# Patient Record
Sex: MALE | Race: BLACK OR AFRICAN AMERICAN | Employment: PART TIME | URBAN - METROPOLITAN AREA
[De-identification: names, ages, dates, MRNs, and addresses within clinical notes are randomized per-mention and may not be internally consistent; named-entity substitution may affect disease eponyms.]

---

## 2017-01-02 DIAGNOSIS — N18.5 CKD (CHRONIC KIDNEY DISEASE) STAGE 5, GFR LESS THAN 15 ML/MIN (H): Primary | ICD-10-CM

## 2017-01-02 NOTE — NURSING NOTE
Labs per clinic 2A protocol.  Follow up/CKD 5  Last OV: 10/26/16  Vickie Bryson LPN  Nephrology  [facility]  [phone]

## 2017-01-04 ENCOUNTER — TELEPHONE (OUTPATIENT)
Dept: PHARMACY | Facility: CLINIC | Age: 62
End: 2017-01-04

## 2017-01-04 ENCOUNTER — ALLIED HEALTH/NURSE VISIT (OUTPATIENT)
Dept: TRANSPLANT | Facility: CLINIC | Age: 62
End: 2017-01-04
Payer: COMMERCIAL

## 2017-01-04 VITALS — SYSTOLIC BLOOD PRESSURE: 133 MMHG | HEART RATE: 89 BPM | DIASTOLIC BLOOD PRESSURE: 70 MMHG

## 2017-01-04 DIAGNOSIS — N18.5 CKD (CHRONIC KIDNEY DISEASE) STAGE 5, GFR LESS THAN 15 ML/MIN (H): ICD-10-CM

## 2017-01-04 DIAGNOSIS — N18.5 CKD (CHRONIC KIDNEY DISEASE) STAGE 5, GFR LESS THAN 15 ML/MIN (H): Primary | ICD-10-CM

## 2017-01-04 DIAGNOSIS — N18.9 ANEMIA IN CHRONIC RENAL DISEASE: ICD-10-CM

## 2017-01-04 DIAGNOSIS — D63.1 ANEMIA IN STAGE 5 CHRONIC KIDNEY DISEASE (H): ICD-10-CM

## 2017-01-04 DIAGNOSIS — D63.1 ANEMIA IN CHRONIC RENAL DISEASE: ICD-10-CM

## 2017-01-04 DIAGNOSIS — N18.5 ANEMIA IN STAGE 5 CHRONIC KIDNEY DISEASE (H): ICD-10-CM

## 2017-01-04 LAB
ALBUMIN SERPL-MCNC: 3 G/DL (ref 3.4–5)
ANION GAP SERPL CALCULATED.3IONS-SCNC: 13 MMOL/L (ref 3–14)
BUN SERPL-MCNC: 88 MG/DL (ref 7–30)
CALCIUM SERPL-MCNC: 8.1 MG/DL (ref 8.5–10.1)
CHLORIDE SERPL-SCNC: 113 MMOL/L (ref 94–109)
CO2 SERPL-SCNC: 16 MMOL/L (ref 20–32)
CREAT SERPL-MCNC: 5.09 MG/DL (ref 0.66–1.25)
ERYTHROCYTE [DISTWIDTH] IN BLOOD BY AUTOMATED COUNT: 18.1 % (ref 10–15)
FERRITIN SERPL-MCNC: 663 NG/ML (ref 26–388)
GFR SERPL CREATININE-BSD FRML MDRD: 12 ML/MIN/1.7M2
GLUCOSE SERPL-MCNC: 110 MG/DL (ref 70–99)
HCT VFR BLD AUTO: 27.6 % (ref 40–53)
HGB BLD-MCNC: 8.6 G/DL (ref 13.3–17.7)
IRON SATN MFR SERPL: 18 % (ref 15–46)
IRON SERPL-MCNC: 43 UG/DL (ref 35–180)
MCH RBC QN AUTO: 29 PG (ref 26.5–33)
MCHC RBC AUTO-ENTMCNC: 31.2 G/DL (ref 31.5–36.5)
MCV RBC AUTO: 93 FL (ref 78–100)
PHOSPHATE SERPL-MCNC: 5.3 MG/DL (ref 2.5–4.5)
PLATELET # BLD AUTO: 174 10E9/L (ref 150–450)
POTASSIUM SERPL-SCNC: 4 MMOL/L (ref 3.4–5.3)
RBC # BLD AUTO: 2.97 10E12/L (ref 4.4–5.9)
SODIUM SERPL-SCNC: 142 MMOL/L (ref 133–144)
TIBC SERPL-MCNC: 243 UG/DL (ref 240–430)
WBC # BLD AUTO: 5 10E9/L (ref 4–11)

## 2017-01-04 PROCEDURE — 80069 RENAL FUNCTION PANEL: CPT | Performed by: INTERNAL MEDICINE

## 2017-01-04 PROCEDURE — 83550 IRON BINDING TEST: CPT | Performed by: INTERNAL MEDICINE

## 2017-01-04 PROCEDURE — 40000269 ZZH STATISTIC NO CHARGE FACILITY FEE: Mod: ZF

## 2017-01-04 PROCEDURE — 63400005 ZZH RX 634: Mod: ZF

## 2017-01-04 PROCEDURE — 85027 COMPLETE CBC AUTOMATED: CPT | Performed by: INTERNAL MEDICINE

## 2017-01-04 PROCEDURE — 83540 ASSAY OF IRON: CPT | Performed by: INTERNAL MEDICINE

## 2017-01-04 PROCEDURE — 82728 ASSAY OF FERRITIN: CPT | Performed by: INTERNAL MEDICINE

## 2017-01-04 PROCEDURE — 96372 THER/PROPH/DIAG INJ SC/IM: CPT | Mod: ZF

## 2017-01-04 NOTE — NURSING NOTE
"Chief Complaint   Patient presents with     Allied Health Visit     Labs and possible Aranesp injection       Initial /70 mmHg  Pulse 89 Estimated body mass index is 31.59 kg/(m^2) as calculated from the following:    Height as of 11/29/16: 1.753 m (5' 9\").    Weight as of 12/1/16: 97.07 kg (214 lb).  BP completed using cuff size: regular  "

## 2017-01-04 NOTE — TELEPHONE ENCOUNTER
Anemia Management Note  SUBJECTIVE/OBJECTIVE:  Referred by Dr. Brice Caraballo on 2015.  Primary Diagnosis: Anemia in Chronic Kidney Disease (N18.5 D63.1)   Secondary Diagnosis: Chronic Kidney Disease, Stage V (N18.5)  Hgb goal range: 9-10  Epo/Darbo: Aranesp 60 mcg/0.3 ml every 2 weeks as needed for hgb<10g/dL - in clinic  RX order expires on 17  Iron regimen: Ferrous Sulfate TID  Lab orders  on 2017 In Epic        Anemia Latest Ref Rng 2016   ANASTASIYA Dose - 60 mcg - - 60 mcg - 60 mcg 60 mcg   Hemoglobin 13.3 - 17.7 g/dL 7.9(L) 6.7(LL) 6.9(LL) 7.0(L) - 7.6(L) 8.6(L)   IV Iron Dose - - - - - 750mg - -   TSAT 15 - 46 % 14(L) - - 13(L) - - 18   Ferritin 26 - 388 ng/mL 505(H) - - 460(H) - - 663(H)     BP Readings from Last 3 Encounters:   17 133/70   16 139/68   16 139/71     Wt Readings from Last 2 Encounters:   16 214 lb (97.07 kg)   16 220 lb (99.791 kg)     Appt w/Dr Caraballo is scheduled for at 1:30    ASSESSMENT:  Hgb: Not at goal - received dose in clinic - recommend continue current regimen  TSat: not at goal (>30%) but ferritin >500ng/mL.  IV iron not indicated at this time per anemia protocol.     PLAN:  RTC for hgb then aranesp if needed in 2 week(s)  Next lab/aranesp appt is scheduled for 17 at 9:00    Orders needed to be renewed (for next follow-up date) in [facility]: None    Iron labs due:  17 (4 weeks after IV iron infusion)    Plan discussed with:  No call made - next appt is scheduled  Plan provided by:  Lakisha    NEXT FOLLOW-UP DATE:  17    Anemia Management Service  Zelda Rich PharmD and Rosa Marcial CPhT  Phone: [phone]  Fax: [phone]

## 2017-01-06 ENCOUNTER — DOCUMENTATION ONLY (OUTPATIENT)
Dept: TRANSPLANT | Facility: CLINIC | Age: 62
End: 2017-01-06

## 2017-01-06 NOTE — PROGRESS NOTES
Call from Murray was returned from this morning. He is wondering what is necessary to complete his transplant evaluation, as his Marla  is urging him to look into this matter. He does not recall if he has the letter that was sent to him this fall with the list of items from [facility]. He is to start with update of his dental work, colonoscopy and completion of the urine collection that Dr. Herbert has requested some time ago. After these items are completed, we will review and order all consults. I encouraged him to become active in this process and if he is not finding he is interested, he could be removed from the waiting list. I also asked him to talk to Dr. Caraballo about his Prednisone dose. We would to consider him for the steroid free protocol post transplant.

## 2017-01-11 ENCOUNTER — TELEPHONE (OUTPATIENT)
Dept: NEPHROLOGY | Facility: CLINIC | Age: 62
End: 2017-01-11

## 2017-01-11 ENCOUNTER — OFFICE VISIT (OUTPATIENT)
Dept: NEPHROLOGY | Facility: CLINIC | Age: 62
End: 2017-01-11
Attending: INTERNAL MEDICINE
Payer: COMMERCIAL

## 2017-01-11 VITALS
TEMPERATURE: 98.4 F | HEART RATE: 77 BPM | SYSTOLIC BLOOD PRESSURE: 145 MMHG | HEIGHT: 69 IN | WEIGHT: 202.6 LBS | OXYGEN SATURATION: 99 % | BODY MASS INDEX: 30.01 KG/M2 | DIASTOLIC BLOOD PRESSURE: 74 MMHG

## 2017-01-11 DIAGNOSIS — N18.5 CKD (CHRONIC KIDNEY DISEASE) STAGE 5, GFR LESS THAN 15 ML/MIN (H): Primary | ICD-10-CM

## 2017-01-11 DIAGNOSIS — I10 ESSENTIAL HYPERTENSION: ICD-10-CM

## 2017-01-11 DIAGNOSIS — E11.22 TYPE 2 DIABETES MELLITUS WITH STAGE 5 CHRONIC KIDNEY DISEASE NOT ON CHRONIC DIALYSIS, WITHOUT LONG-TERM CURRENT USE OF INSULIN (H): ICD-10-CM

## 2017-01-11 DIAGNOSIS — D47.2 MGUS (MONOCLONAL GAMMOPATHY OF UNKNOWN SIGNIFICANCE): ICD-10-CM

## 2017-01-11 DIAGNOSIS — N18.5 TYPE 2 DIABETES MELLITUS WITH STAGE 5 CHRONIC KIDNEY DISEASE NOT ON CHRONIC DIALYSIS, WITHOUT LONG-TERM CURRENT USE OF INSULIN (H): ICD-10-CM

## 2017-01-11 PROCEDURE — 99213 OFFICE O/P EST LOW 20 MIN: CPT | Mod: ZF

## 2017-01-11 ASSESSMENT — PAIN SCALES - GENERAL: PAINLEVEL: NO PAIN (0)

## 2017-01-11 NOTE — Clinical Note
1/11/2017       RE: Murray Nicholson  [address]  [place] [address]     Dear Colleague,    Thank you for referring your patient, Murray Nicholson, to the [facility] NEPHROLOGY at [facility] Please see a copy of my visit note below.    Nephrology follow up    61yo aaM with diabetic/hypertensive nephropathy with albuminuria since 2004 here for follow up. Doing well since last visit except for pneumonia which he is now over. Denies nausea, vomiting, anorexia, dysuria, hematuria. 4pt ROS negative.    PMHx  DM - since ~2000, no retinopathy/neuropathy  HTN - since ~2000  Dyslipidemia  GERD  CHF - admit 2008, diastolic dysfunction  CKD - since ~2000   - Ualb/Cr 534 in 2004   - UAs negative for blood    Medications  Hydralazine 50mg TID  Metoprolol 100mg daily  Lisinopril 40mg BID  Isosorbide mononitrate 60mg daily  Amlodipine 5mg BID  Furosemide 80mg BID  Atorvastatin 40mg daily  ASA 81mg daily  Omeprazole 40mg daily  Fe sulf 325mg TID  darbe  K Cl  Cholecalciferol 5000 daily  No NSAIDs    Exam   145/77   148/76   145/74   P77   98.4  Gen - nad  CV - rrr, no rub, +KELLY  Resp - cta bilat  Ext - 2+ edema  Neuro - no asterixis  Psych - pleasant, appropriate, talkative    Labs     2014 2015     2016  2017   12/'02 12/'04 11/'06 8/'13 10/22 4/9 5/7 5/18 5/19 6/16 2/10 10/26 1/4  Cr 1.5  2.2 2.1 2.7 4.3 4.7 6.3 5.6  4.3  3.8  4.5  5.1 (eGFR 12)  Uprot/Cr   1.3  Ualb/Cr 534    2270    7/3/13 UA - 100 alb, neg blood  5/5/15 UA - 100 alb, neg blood    Assessment/Recommendations: 61yo aaM with stage IV/V CKD with proteinuria.   CKD - stage IV/V likely due to DM and hypertension. No acute indication for dialysis at this time.    - chosen PD for RRT   - transplant - needs dental evaluation, colonoscopy, and 24hr urine for Dr Herbert (evaluate MGUS)   - avoid NSAIDs    Volume/BP - BP slightly above target with significant edema. Target BP <140/90.    - continue current antihypertensives for  now   - increased furosemide to 80mg BID    Anemia - Referred to anemia program. Continue Fe. Hgb improved to 8.6 from 6-7s in Nov/Dec.    Acid-base - bicarb low at 16, monitor for now.    Ca/phos - acceptable    Electrolytes - acceptable.    Current RRT choice: PD  RTC in 2 months for BP check, renal panel, CBC        Again, thank you for allowing me to participate in the care of your patient.      Sincerely,    Brice Caraballo MD

## 2017-01-11 NOTE — NURSING NOTE
"Chief Complaint   Patient presents with     RECHECK     Ckd stage 5 follow up       Initial /74 mmHg  Pulse 77  Temp(Src) 98.4  F (36.9  C) (Oral)  Ht 1.753 m (5' 9\")  Wt 91.899 kg (202 lb 9.6 oz)  BMI 29.91 kg/m2  SpO2 99% Estimated body mass index is 29.91 kg/(m^2) as calculated from the following:    Height as of this encounter: 1.753 m (5' 9\").    Weight as of this encounter: 91.899 kg (202 lb 9.6 oz).  BP completed using cuff size: regular    "

## 2017-01-11 NOTE — MR AVS SNAPSHOT
After Visit Summary   1/11/2017    Murray Nicholson    MRN: [id number]           Patient Information     Date Of Birth          1955        Visit Information        Provider Department      1/11/2017 1:30 PM Brice Caraballo MD [facility] Nephrology        Today's Diagnoses     CKD (chronic kidney disease) stage 5, GFR less than 15 ml/min (H)    -  1     Type 2 diabetes mellitus with stage 5 chronic kidney disease not on chronic dialysis, without long-term current use of insulin (H)         Essential hypertension         MGUS (monoclonal gammopathy of unknown significance)            Follow-ups after your visit        Follow-up notes from your care team     Return in about 2 months (around 3/11/2017).      Your next 10 appointments already scheduled     Jan 18, 2017  9:00 AM   Lab with [facility] LAB    [facility] ([facility])    [address] [place] [address]   [phone]            Jan 18, 2017  9:30 AM   (Arrive by 9:15 AM)   INJECTION with Uc Txc Nurse   [facility] Solid Organ Transplant ([facility])    [address] [address]   [phone]            Feb 01, 2017  9:00 AM   Lab with UC LAB    [facility] ([facility])    [address] [place] [address]   [phone]            Feb 01, 2017  9:30 AM   (Arrive by 9:15 AM)   INJECTION with Uc Txc Nurse   [facility] Solid Organ Transplant ([facility])    [address] [place] [address]   [phone]            Feb 15, 2017  9:00 AM   Lab with UC LAB    [facility] ([facility])    [address] [place] [address]   [phone]            Feb 15, 2017  9:30 AM   (Arrive by 9:15 AM)   INJECTION with Uc Txc Nurse   [facility] Solid Organ Transplant ([facility])    [address] [id number] [phone]            Mar 15, 2017  1:00 PM   Lab with  LAB   [facility] ([facility])    [address] [place] [id number] [phone]            Mar 15, 2017  2:00 PM   (Arrive by 1:30 PM)   Return Visit with Brice Caraballo MD   [facility] Nephrology ([facility])    [address] [place] [address] [phone]              Who to contact     If you have questions or need follow up information about today's clinic visit or your schedule please contact [facility] NEPHROLOGY directly at [phone].  Normal or non-critical lab and imaging results will be communicated to you by Stadionauthart, letter or phone within 4 business days after the clinic has received the results. If you do not hear from us within 7 days, please contact the clinic through Stadionauthart or phone. If you have a critical or abnormal lab result, we will notify you by phone as soon as possible.  Submit refill requests through Rainbow or call your pharmacy and they will forward the refill request to us. Please allow 3 business days for your refill to be completed.          Additional Information About Your Visit        Rainbow Information     Rainbow gives you secure access to your electronic health record. If you see a primary care provider, you can also send messages to your care team and make appointments. If you have questions, please call your primary care clinic.  If you do not have a primary care provider, please call [phone] and they will assist you.        Care EveryWhere ID     This is your Care EveryWhere ID. This could be used by other organizations to access your [facility] medical records  [id number]        Your Vitals Were     Pulse Temperature Height BMI (Body Mass Index) Pulse Oximetry       77 98.4  F (36.9  C) (Oral) 1.753 m (5' 9\") 29.91 kg/m2 99%        Blood " Pressure from Last 3 Encounters:   01/11/17 145/74   01/04/17 133/70   12/21/16 139/68    Weight from Last 3 Encounters:   01/11/17 91.899 kg (202 lb 9.6 oz)   12/01/16 97.07 kg (214 lb)   11/29/16 99.791 kg (220 lb)              Today, you had the following     No orders found for display       Primary Care Provider Office Phone # Fax #    Yahir Turcios [phone]       [place] [address]  [facility] [address]        Thank you!     Thank you for choosing [facility] NEPHROLOGY  for your care. Our goal is always to provide you with excellent care. Hearing back from our patients is one way we can continue to improve our services. Please take a few minutes to complete the written survey that you may receive in the mail after your visit with us. Thank you!             Your Updated Medication List - Protect others around you: Learn how to safely use, store and throw away your medicines at www.disposemymeds.org.          This list is accurate as of: 1/11/17  2:24 PM.  Always use your most recent med list.                   Brand Name Dispense Instructions for use    albuterol 108 (90 BASE) MCG/ACT Inhaler    PROAIR HFA/PROVENTIL HFA/VENTOLIN HFA    1 Inhaler    Inhale 2 puffs into the lungs every 6 hours as needed for shortness of breath / dyspnea or wheezing       amLODIPine 5 MG tablet    NORVASC    180 tablet    1 TABLET BID- generic ok       aspirin 81 MG tablet      1 TABLET DAILY       atorvastatin 40 MG tablet    LIPITOR    90 tablet    Take 1 tablet (40 mg) by mouth daily       * azelastine-fluticasone 137-50 MCG/ACT nasal spray    DYMISTA     Spray 1 spray into both nostrils 2 times daily       * azelastine-fluticasone 137-50 MCG/ACT nasal spray    DYMISTA    23 g    Spray 1 spray into both nostrils 2 times daily       darbepoetin emily 60 MCG/0.3ML injection    ARANESP (ALBUMIN FREE)    0.3 mL    Inject 0.3 mLs (60 mcg) Subcutaneous every 14 days As needed for hgb<10g/dL.  If Hgb  increases >1 point in 2 weeks (if blood transfusion given, use hgb PRIOR to this), SYSTOLIC BP > 180 mmHg or hgb>=10g/dL, HOLD DOSE. Dose must be within 1 week of Hgb.  Per anemia protocol with Brice Caraballo MD       ferrous sulfate 325 (65 FE) MG tablet    IRON    200 tablet    Take 1 tablet (325 mg) by mouth 3 times daily (with meals)       furosemide 80 MG tablet    LASIX    90 tablet    Take 1 tablet (80 mg) by mouth 2 times daily       glipiZIDE 5 MG tablet    GLUCOTROL    90 tablet    Take 1 tablet by mouth. TAKE 1 TABLET BY MOUTH DAILY BEFORE A MEAL       hydrALAZINE 50 MG tablet    APRESOLINE    270 tablet    Take 1 tablet (50 mg) by mouth 3 times daily       isosorbide mononitrate 60 MG 24 hr tablet    IMDUR    90 tablet    Take 1 tablet (60 mg) by mouth daily       levofloxacin 750 MG tablet    LEVAQUIN    5 tablet    Take 1 tablet (750 mg) by mouth daily       lisinopril 40 MG tablet    PRINIVIL/ZESTRIL    180 tablet    Take 1 tablet by mouth. one twice daily       loratadine 10 MG tablet    CLARITIN     Take 10 mg by mouth as needed.       metoprolol 100 MG 24 hr tablet    TOPROL XL    90 tablet    Take 1 tablet (100 mg) by mouth daily       omeprazole 40 MG capsule    priLOSEC     Take 20 mg by mouth daily       potassium chloride SA 20 MEQ CR tablet    K-DUR/KLOR-CON M    90 tablet    Take 1 tablet (20 mEq) by mouth daily       PREDNISONE PO      Take 10 mg by mouth daily       UNABLE TO FIND      MEDICATION NAME: airborne prn.       VITAMIN D3 PO      Take 5,000 Units by mouth daily       * Notice:  This list has 2 medication(s) that are the same as other medications prescribed for you. Read the directions carefully, and ask your doctor or other care provider to review them with you.

## 2017-01-11 NOTE — PROGRESS NOTES
Nephrology follow up    61yo aaM with diabetic/hypertensive nephropathy with albuminuria since 2004 here for follow up. Doing well since last visit except for pneumonia which he is now over. Denies nausea, vomiting, anorexia, dysuria, hematuria. 4pt ROS negative.    PMHx  DM - since ~2000, no retinopathy/neuropathy  HTN - since ~2000  Dyslipidemia  GERD  CHF - admit 2008, diastolic dysfunction  CKD - since ~2000   - Ualb/Cr 534 in 2004   - UAs negative for blood    Medications  Hydralazine 50mg TID  Metoprolol 100mg daily  Lisinopril 40mg BID  Isosorbide mononitrate 60mg daily  Amlodipine 5mg BID  Furosemide 80mg BID  Atorvastatin 40mg daily  ASA 81mg daily  Omeprazole 40mg daily  Fe sulf 325mg TID  darbe  K Cl  Cholecalciferol 5000 daily  No NSAIDs    Exam   145/77   148/76   145/74   P77   98.4  Gen - nad  CV - rrr, no rub, +KELLY  Resp - cta bilat  Ext - 2+ edema  Neuro - no asterixis  Psych - pleasant, appropriate, talkative    Labs     2014 2015     2016  2017   12/'02 12/'04 11/'06 8/'13 10/22 4/9 5/7 5/18 5/19 6/16 2/10 10/26 1/4  Cr 1.5  2.2 2.1 2.7 4.3 4.7 6.3 5.6  4.3  3.8  4.5  5.1 (eGFR 12)  Uprot/Cr   1.3  Ualb/Cr 534    2270    7/3/13 UA - 100 alb, neg blood  5/5/15 UA - 100 alb, neg blood    Assessment/Recommendations: 61yo aaM with stage IV/V CKD with proteinuria.   CKD - stage IV/V likely due to DM and hypertension. No acute indication for dialysis at this time.    - chosen PD for RRT   - transplant - needs dental evaluation, colonoscopy, and 24hr urine for Dr Herbert (evaluate MGUS)   - avoid NSAIDs    Volume/BP - BP slightly above target with significant edema. Target BP <140/90.    - continue current antihypertensives for now   - increased furosemide to 80mg BID    Anemia - Referred to anemia program. Continue Fe. Hgb improved to 8.6 from 6-7s in Nov/Dec.    Acid-base - bicarb low at 16, monitor for now.    Ca/phos - acceptable    Electrolytes - acceptable.    Current RRT choice: PD  RTC in 2 months  for BP check, renal panel, CBC

## 2017-01-11 NOTE — TELEPHONE ENCOUNTER
Spoke with Dr. Caraballo regarding need for vascular access consult (for peritoneal catheter placement), and he requested this be ordered. Updated patient. Discussed consult process, and he agreed to schedule. Message sent to vascular access.    Carla Kyle RN

## 2017-01-18 ENCOUNTER — TELEPHONE (OUTPATIENT)
Dept: PHARMACY | Facility: CLINIC | Age: 62
End: 2017-01-18

## 2017-01-18 ENCOUNTER — ALLIED HEALTH/NURSE VISIT (OUTPATIENT)
Dept: TRANSPLANT | Facility: CLINIC | Age: 62
End: 2017-01-18
Payer: COMMERCIAL

## 2017-01-18 DIAGNOSIS — D63.1 ANEMIA IN STAGE 5 CHRONIC KIDNEY DISEASE (H): ICD-10-CM

## 2017-01-18 DIAGNOSIS — N18.9 ANEMIA IN CHRONIC RENAL DISEASE: Primary | ICD-10-CM

## 2017-01-18 DIAGNOSIS — N18.5 ANEMIA IN STAGE 5 CHRONIC KIDNEY DISEASE (H): ICD-10-CM

## 2017-01-18 DIAGNOSIS — N18.5 CKD (CHRONIC KIDNEY DISEASE) STAGE 5, GFR LESS THAN 15 ML/MIN (H): ICD-10-CM

## 2017-01-18 DIAGNOSIS — D63.1 ANEMIA IN CHRONIC RENAL DISEASE: Primary | ICD-10-CM

## 2017-01-18 LAB
HCT VFR BLD AUTO: 30 % (ref 40–53)
HGB BLD-MCNC: 9.9 G/DL (ref 13.3–17.7)

## 2017-01-18 PROCEDURE — 40000269 ZZH STATISTIC NO CHARGE FACILITY FEE: Mod: ZF

## 2017-01-18 PROCEDURE — 36415 COLL VENOUS BLD VENIPUNCTURE: CPT | Performed by: INTERNAL MEDICINE

## 2017-01-18 PROCEDURE — 85018 HEMOGLOBIN: CPT | Performed by: INTERNAL MEDICINE

## 2017-01-18 PROCEDURE — 96372 THER/PROPH/DIAG INJ SC/IM: CPT | Mod: ZF

## 2017-01-18 PROCEDURE — 85014 HEMATOCRIT: CPT | Performed by: INTERNAL MEDICINE

## 2017-01-18 NOTE — TELEPHONE ENCOUNTER
Anemia Management Note  SUBJECTIVE/OBJECTIVE:  Referred by Dr. Brice Caraballo on 2015.  Primary Diagnosis: Anemia in Chronic Kidney Disease (N18.5 D63.1)   Secondary Diagnosis: Chronic Kidney Disease, Stage V (N18.5)  Hgb goal range: 9-10  Epo/Darbo: Aranesp 60 mcg/0.3 ml every 2 weeks as needed for hgb<10g/dL - in clinic  RX order expires on 17  Iron regimen: Ferrous Sulfate TID  Lab orders  on 2017 In Epic    Anemia Latest Ref Rng 2016   ANASTASIYA Dose - - - 60 mcg - 60 mcg 60 mcg -   Hemoglobin 13.3 - 17.7 g/dL 6.7(LL) 6.9(LL) 7.0(L) - 7.6(L) 8.6(L) 9.9(L)   IV Iron Dose - - - - 750mg - - -   TSAT 15 - 46 % - - 13(L) - - 18 -   Ferritin 26 - 388 ng/mL - - 460(H) - - 663(H) -     BP Readings from Last 3 Encounters:   17 145/74   17 133/70   16 139/68     Wt Readings from Last 2 Encounters:   17 202 lb 9.6 oz (91.899 kg)   16 214 lb (97.07 kg)     Next lab/aranesp appt is scheduled for 17 at 9:00 am    ASSESSMENT:  Hgb: At goal but rapid rise in hgb (>1pt/2 weeks) - recommend hold dose  TSat: not at goal (>30%) but ferritin >500ng/mL.  IV iron not indicated at this time per anemia protocol.     PLAN:  Hold Aranesp and RTC for hgb then aranesp if needed in 2 week(s)    Orders needed to be renewed (for next follow-up date) in [facility]: None    Iron labs due:  17    Plan discussed with:  No call made - next appt is scheduled  Plan provided by:  Lakisha    NEXT FOLLOW-UP DATE:  17    Anemia Management Service  Zelda Rich,PharmD and Rosa MarcialCPhT  Phone: [phone]  Fax: [phone]

## 2017-01-27 ENCOUNTER — TELEPHONE (OUTPATIENT)
Dept: TRANSPLANT | Facility: CLINIC | Age: 62
End: 2017-01-27

## 2017-01-27 ENCOUNTER — DOCUMENTATION ONLY (OUTPATIENT)
Dept: TRANSPLANT | Facility: CLINIC | Age: 62
End: 2017-01-27

## 2017-01-27 NOTE — TELEPHONE ENCOUNTER
Left message for patient discussing catheter placement. Advised it would be arranged based on lab results and uremic symptoms. Getting consult done now would ensure easier scheduling when / if the time comes for dialysis. Left phone number to call if further questions.    Carla Kyle RN

## 2017-01-27 NOTE — PROGRESS NOTES
Murray called today wondering how urgent his PD cath placement was. I redirected this question to Sum, however, might be a question for his nephrologist. Additionally he wanted to change his Aranesp and lab appts, and was provided with Margaret the 's phone number.

## 2017-01-27 NOTE — TELEPHONE ENCOUNTER
Received a call from the patient who wondering how urgent his PD cath placement was. Writer addressed that timing of PD cath placement surgery will be coordinated with nephrology team, based on his clinical presentation. We'll F/U with Dr. Caraballo's team to determine when he is ready to initiate PD and then we'll schedule surgery date that allows time for PD training. He expressed that he would like to wait until spring for surgery. Writer recommended he could discuss with Dr. Arvizu at consult clinic for timing of surgery.  In addition, he wanted to change his Aranesp and lab appts on the day of PD consult visit. He was instructed to contact nephrology team to make the change.  Writer provided contact phone number and instructed patient to call writer with any questions or concerns. Patient verbalized acceptance and understanding of plan.

## 2017-01-31 ENCOUNTER — ALLIED HEALTH/NURSE VISIT (OUTPATIENT)
Dept: TRANSPLANT | Facility: CLINIC | Age: 62
End: 2017-01-31
Attending: SURGERY
Payer: COMMERCIAL

## 2017-01-31 ENCOUNTER — OFFICE VISIT (OUTPATIENT)
Dept: TRANSPLANT | Facility: CLINIC | Age: 62
End: 2017-01-31
Attending: SURGERY
Payer: COMMERCIAL

## 2017-01-31 VITALS — HEART RATE: 85 BPM | DIASTOLIC BLOOD PRESSURE: 73 MMHG | SYSTOLIC BLOOD PRESSURE: 146 MMHG

## 2017-01-31 VITALS
RESPIRATION RATE: 16 BRPM | TEMPERATURE: 98.1 F | SYSTOLIC BLOOD PRESSURE: 144 MMHG | DIASTOLIC BLOOD PRESSURE: 76 MMHG | OXYGEN SATURATION: 100 % | HEART RATE: 77 BPM

## 2017-01-31 DIAGNOSIS — D63.1 ANEMIA IN CHRONIC RENAL DISEASE: ICD-10-CM

## 2017-01-31 DIAGNOSIS — D63.1 ANEMIA IN STAGE 5 CHRONIC KIDNEY DISEASE (H): ICD-10-CM

## 2017-01-31 DIAGNOSIS — N18.5 ANEMIA IN STAGE 5 CHRONIC KIDNEY DISEASE (H): ICD-10-CM

## 2017-01-31 DIAGNOSIS — N18.5 CKD (CHRONIC KIDNEY DISEASE) STAGE 5, GFR LESS THAN 15 ML/MIN (H): Primary | ICD-10-CM

## 2017-01-31 DIAGNOSIS — E11.9 TYPE 2 DIABETES MELLITUS WITHOUT COMPLICATION, WITH LONG-TERM CURRENT USE OF INSULIN (H): ICD-10-CM

## 2017-01-31 DIAGNOSIS — Z79.4 TYPE 2 DIABETES MELLITUS WITHOUT COMPLICATION, WITH LONG-TERM CURRENT USE OF INSULIN (H): ICD-10-CM

## 2017-01-31 DIAGNOSIS — Z76.82 ORGAN TRANSPLANT CANDIDATE: ICD-10-CM

## 2017-01-31 DIAGNOSIS — N18.9 ANEMIA IN CHRONIC RENAL DISEASE: ICD-10-CM

## 2017-01-31 DIAGNOSIS — N18.5 CKD (CHRONIC KIDNEY DISEASE) STAGE 5, GFR LESS THAN 15 ML/MIN (H): ICD-10-CM

## 2017-01-31 LAB
HCT VFR BLD AUTO: 28.5 % (ref 40–53)
HGB BLD-MCNC: 9.1 G/DL (ref 13.3–17.7)

## 2017-01-31 PROCEDURE — 96372 THER/PROPH/DIAG INJ SC/IM: CPT | Mod: ZF

## 2017-01-31 PROCEDURE — 63400005 ZZH RX 634: Mod: ZF

## 2017-01-31 PROCEDURE — 36415 COLL VENOUS BLD VENIPUNCTURE: CPT | Performed by: INTERNAL MEDICINE

## 2017-01-31 PROCEDURE — 40000269 ZZH STATISTIC NO CHARGE FACILITY FEE: Mod: ZF

## 2017-01-31 PROCEDURE — 85014 HEMATOCRIT: CPT | Performed by: INTERNAL MEDICINE

## 2017-01-31 PROCEDURE — 85018 HEMOGLOBIN: CPT | Performed by: INTERNAL MEDICINE

## 2017-01-31 NOTE — PROGRESS NOTES
Dialysis Access Service  Consult Note    Referred by Dr. Caraballo for creation of peritoneal dialysis access.    HPI: Mr. Nicholson is being seen today for placement of peritoneal dialysis access due to Chronic renal failure from Type 2 Diabetes.  He is right handed. Mr. Nicholson is not dialyzing at this time.     Past Surgical History   Procedure Laterality Date     No history of surgery       Laparoscopic herniorrhaphy inguinal bilateral Bilateral 7/24/2015     Procedure: LAPAROSCOPIC HERNIORRHAPHY INGUINAL BILATERAL;  Surgeon: Bobby Mcconnell MD;  Location: [facility] OR       Risk factors for complications of PD catheter access are:     Hx of abdominal surgery  [x]    Comment:       Hx of  hernia repair/hydrocele [x]        History of previous PD catheter []    Comment:     Respiratory problems   [x]    Comment: pneumonia x 2 months, cleared up 3 weeks ago.  Obesity    [x]      Diabetes   [x]  Type 2       Anticoagulation:   [x]    Agent:       81 mg                               Current immunosuppression [x]   Comment: intermittent prednisone pulses for gout (3 week duration, about every couple months).                                 PHYSICAL EXAM:  Temp:  [98.1  F (36.7  C)] 98.1  F (36.7  C)  Pulse:  [77] 77  Resp:  [16] 16  BP: (144)/(76) 144/76 mmHg  SpO2:  [100 %] 100 %  healthy, alert and cooperative  Abdominal Exam: obese.  Belt line about 3 inches below umbilicus. No hernias.    Assessment & Plan: Mr. Nicholson is a good candidate for peritoneal  dialysis access.  I would recommend laparoscopic PD catheter placement with left sided exit, created under General.     The surgical risks and benefits were reviewed and questions were answered. We discussed the day of surgery plan, anesthesia, postop care, risk of infection, bleeding, thrombosis, possible need for intraoperative omentectomy or newly detected hernia repair or obliteration of patent processus vaginalis (if male), future need for surgical revision or  removal. This was contrasted with morbidity and mortality risk of long-term catheter based hemodialysis access. The patient does not wish to proceed with surgery for permanent access creation at this time. The patient was counselled to contact our nurse coordinator, VERONICA Daniel (Sum), [facility] at [phone] with any questions or concerns.  Thank you for the opportunity to participate in Mr. Nicholson's care.    TT: 35 min, CT: 25 min.          Esteban Arvizu MD  Department of Surgery

## 2017-01-31 NOTE — MR AVS SNAPSHOT
After Visit Summary   1/31/2017    Murray Nicholson    MRN: [id number]           Patient Information     Date Of Birth          1955        Visit Information        Provider Department      1/31/2017 10:00 AM Esteban Arvizu MD [facility] Organ Transplant        Today's Diagnoses     CKD (chronic kidney disease) stage 5, GFR less than 15 ml/min (H)    -  1    Anemia in chronic renal disease        Organ transplant candidate        Type 2 diabetes mellitus without complication, with long-term current use of insulin (H)           Follow-ups after your visit        Your next 10 appointments already scheduled     Feb 28, 2017  3:00 PM CST   Infusion 120 with UC SPEC INFUSION, UC 47 ATC   [facility] [place] [facility] ([facility])    [address] [place] [address]  2nd [facility] [id number] [phone]            Mar 07, 2017 10:30 AM CST   Lab with UC LAB   [facility] ([facility])    [address] [place] [id number] [phone]            Mar 07, 2017 11:00 AM CST   (Arrive by 10:45 AM)   INJECTION with Uc Txc Nurse   [facility] Transplant ([facility])    [address] [place] [address]  3rd [facility] [id number] [phone]            Mar 15, 2017  1:00 PM CDT   Lab with UC LAB   [facility] ([facility])    [address] [place] [id number] [phone]            Mar 15, 2017  2:00 PM CDT   (Arrive by 1:30 PM)   Return Visit with Brice Caraballo MD   [facility] Nephrology ([facility])    [address] [place] [id number] [phone]              Who to contact     If you have questions or need follow up information about today's clinic visit or your schedule please contact [facility] SOLID ORGAN TRANSPLANT directly at [phone].  Normal or  non-critical lab and imaging results will be communicated to you by MyChart, letter or phone within 4 business days after the clinic has received the results. If you do not hear from us within 7 days, please contact the clinic through [employer] or phone. If you have a critical or abnormal lab result, we will notify you by phone as soon as possible.  Submit refill requests through [employer] or call your pharmacy and they will forward the refill request to us. Please allow 3 business days for your refill to be completed.          Additional Information About Your Visit        [employer] Information     [employer] gives you secure access to your electronic health record. If you see a primary care provider, you can also send messages to your care team and make appointments. If you have questions, please call your primary care clinic.  If you do not have a primary care provider, please call [phone] and they will assist you.        Care EveryWhere ID     This is your Care EveryWhere ID. This could be used by other organizations to access your [place] medical records  [id number]        Your Vitals Were     Pulse Temperature Respirations Pulse Oximetry          77 98.1  F (36.7  C) (Oral) 16 100%         Blood Pressure from Last 3 Encounters:   02/15/17 122/66   01/31/17 146/73   01/31/17 144/76    Weight from Last 3 Encounters:   02/15/17 88.9 kg (196 lb)   01/11/17 91.9 kg (202 lb 9.6 oz)   12/01/16 97.1 kg (214 lb)              Today, you had the following     No orders found for display       Primary Care Provider Office Phone # Fax #    Yahir Turcios [phone]       [place] [address]  [facility] [address]        Thank you!     Thank you for choosing [facility] SOL[facility] ORGAN TRANSPLANT  for your care. Our goal is always to provide you with excellent care. Hearing back from our patients is one way we can continue to improve our services. Please take a few minutes to complete the written  survey that you may receive in the mail after your visit with us. Thank you!             Your Updated Medication List - Protect others around you: Learn how to safely use, store and throw away your medicines at www.disposemymeds.org.          This list is accurate as of: 1/31/17 11:59 PM.  Always use your most recent med list.                   Brand Name Dispense Instructions for use    albuterol 108 (90 BASE) MCG/ACT Inhaler    PROAIR HFA/PROVENTIL HFA/VENTOLIN HFA    1 Inhaler    Inhale 2 puffs into the lungs every 6 hours as needed for shortness of breath / dyspnea or wheezing       amLODIPine 5 MG tablet    NORVASC    180 tablet    1 TABLET BID- generic ok       aspirin 81 MG tablet      1 TABLET DAILY       atorvastatin 40 MG tablet    LIPITOR    90 tablet    Take 1 tablet (40 mg) by mouth daily       * azelastine-fluticasone 137-50 MCG/ACT nasal spray    DYMISTA     Spray 1 spray into both nostrils 2 times daily       * azelastine-fluticasone 137-50 MCG/ACT nasal spray    DYMISTA    23 g    Spray 1 spray into both nostrils 2 times daily       darbepoetin emily 60 MCG/0.3ML injection    ARANESP (ALBUMIN FREE)    0.3 mL    Inject 0.3 mLs (60 mcg) Subcutaneous every 14 days As needed for hgb<10g/dL.  If Hgb increases >1 point in 2 weeks (if blood transfusion given, use hgb PRIOR to this), SYSTOLIC BP > 180 mmHg or hgb>=10g/dL, HOLD DOSE. Dose must be within 1 week of Hgb.  Per anemia protocol with Brice Caraballo MD       ferrous sulfate 325 (65 FE) MG tablet    IRON    200 tablet    Take 1 tablet (325 mg) by mouth 3 times daily (with meals)       furosemide 80 MG tablet    LASIX    90 tablet    Take 1 tablet (80 mg) by mouth 2 times daily       glipiZIDE 5 MG tablet    GLUCOTROL    90 tablet    Take 1 tablet by mouth. TAKE 1 TABLET BY MOUTH DAILY BEFORE A MEAL       hydrALAZINE 50 MG tablet    APRESOLINE    270 tablet    Take 1 tablet (50 mg) by mouth 3 times daily       isosorbide mononitrate 60 MG 24 hr tablet     IMDUR    90 tablet    Take 1 tablet (60 mg) by mouth daily       levofloxacin 750 MG tablet    LEVAQUIN    5 tablet    Take 1 tablet (750 mg) by mouth daily       lisinopril 40 MG tablet    PRINIVIL/ZESTRIL    180 tablet    Take 1 tablet by mouth. one twice daily       loratadine 10 MG tablet    CLARITIN     Take 10 mg by mouth as needed.       metoprolol 100 MG 24 hr tablet    TOPROL XL    90 tablet    Take 1 tablet (100 mg) by mouth daily       omeprazole 40 MG capsule    priLOSEC     Take 20 mg by mouth daily       potassium chloride SA 20 MEQ CR tablet    K-DUR/KLOR-CON M    90 tablet    Take 1 tablet (20 mEq) by mouth daily       PREDNISONE PO      Take 10 mg by mouth daily       UNABLE TO FIND      MEDICATION NAME: airborne prn.       VITAMIN D3 PO      Take 5,000 Units by mouth daily       * Notice:  This list has 2 medication(s) that are the same as other medications prescribed for you. Read the directions carefully, and ask your doctor or other care provider to review them with you.

## 2017-01-31 NOTE — NURSING NOTE
"Chief Complaint   Patient presents with     Vascular Access Problem     PD cath consult       Initial /76 mmHg  Pulse 77  Temp(Src) 98.1  F (36.7  C) (Oral)  Resp 16  SpO2 100% Estimated body mass index is 29.91 kg/(m^2) as calculated from the following:    Height as of 1/11/17: 1.753 m (5' 9\").    Weight as of 1/11/17: 91.899 kg (202 lb 9.6 oz).  BP completed using cuff size: regular    "

## 2017-01-31 NOTE — NURSING NOTE
"Chief Complaint   Patient presents with     Allied Health Visit     Labs and possible Aranesp injection       Initial /73 mmHg  Pulse 85 Estimated body mass index is 29.91 kg/(m^2) as calculated from the following:    Height as of 17: 1.753 m (5' 9\").    Weight as of 17: 91.899 kg (202 lb 9.6 oz).  BP completed using cuff size: large    Patient identified by name and .  Aranesp given per protocol and patient left in stable condition.    Joyce Vega MA  "

## 2017-01-31 NOTE — LETTER
1/31/2017       RE: Murray Nicholson  [address]  [place] [address]     Dear Colleague,    Thank you for referring your patient, Murray Nicholson, to the [facility] SOLID ORGAN TRANSPLANT at [facility]. Please see a copy of my visit note below.    Dialysis Access Service  Consult Note    Referred by Dr. Caraballo for creation of peritoneal dialysis access.    HPI: Mr. Nicholson is being seen today for placement of peritoneal dialysis access due to Chronic renal failure from Type 2 Diabetes.  He is right handed. Mr. Nicholson is not dialyzing at this time.     Past Surgical History   Procedure Laterality Date     No history of surgery       Laparoscopic herniorrhaphy inguinal bilateral Bilateral 7/24/2015     Procedure: LAPAROSCOPIC HERNIORRHAPHY INGUINAL BILATERAL;  Surgeon: Bobby Mcconnell MD;  Location:  OR       Risk factors for complications of PD catheter access are:     Hx of abdominal surgery  [x]    Comment:       Hx of  hernia repair/hydrocele [x]        History of previous PD catheter []    Comment:     Respiratory problems   [x]    Comment: pneumonia x 2 months, cleared up 3 weeks ago.  Obesity    [x]      Diabetes   [x]  Type 2       Anticoagulation:   [x]    Agent:       81 mg                               Current immunosuppression [x]   Comment: intermittent prednisone pulses for gout (3 week duration, about every couple months).                                 PHYSICAL EXAM:  Temp:  [98.1  F (36.7  C)] 98.1  F (36.7  C)  Pulse:  [77] 77  Resp:  [16] 16  BP: (144)/(76) 144/76 mmHg  SpO2:  [100 %] 100 %  healthy, alert and cooperative  Abdominal Exam: obese.  Belt line about 3 inches below umbilicus. No hernias.    Assessment & Plan: Mr. Nicholson is a good candidate for peritoneal  dialysis access.  I would recommend laparoscopic PD catheter placement with left sided exit, created under General.     The surgical risks and benefits were reviewed and questions  were answered. We discussed the day of surgery plan, anesthesia, postop care, risk of infection, bleeding, thrombosis, possible need for intraoperative omentectomy or newly detected hernia repair or obliteration of patent processus vaginalis (if male), future need for surgical revision or removal. This was contrasted with morbidity and mortality risk of long-term catheter based hemodialysis access. The patient does not wish to proceed with surgery for permanent access creation at this time. The patient was counselled to contact our nurse coordinator, VERONICA Daniel (Sum), [facility] at [phone] with any questions or concerns.  Thank you for the opportunity to participate in Mr. Nicholson's care.    TT: 35 min, CT: 25 min.          Esteban Arvizu MD  Department of Surgery

## 2017-02-01 ENCOUNTER — TELEPHONE (OUTPATIENT)
Dept: PHARMACY | Facility: CLINIC | Age: 62
End: 2017-02-01

## 2017-02-01 NOTE — TELEPHONE ENCOUNTER
Anemia Management Note  SUBJECTIVE/OBJECTIVE:  Referred by Dr. Brice Caraballo on 2015.  Primary Diagnosis: Anemia in Chronic Kidney Disease (N18.5 D63.1)   Secondary Diagnosis: Chronic Kidney Disease, Stage V (N18.5)  Hgb goal range: 9-10  Epo/Darbo: Aranesp 60 mcg/0.3 ml every 2 weeks as needed for hgb<10g/dL - in clinic  RX order expires on 17  Iron regimen: Ferrous Sulfate TID  Lab orders  on 2017 In Epic    Anemia Latest Ref Rng 2016   ANASTASIYA Dose - - 60 mcg - 60 mcg 60 mcg - 60 mcg   Hemoglobin 13.3 - 17.7 g/dL 6.9(LL) 7.0(L) - 7.6(L) 8.6(L) 9.9(L) 9.1(L)   IV Iron Dose - - - 750mg - - - -   TSAT 15 - 46 % - 13(L) - - 18 - -   Ferritin 26 - 388 ng/mL - 460(H) - - 663(H) - -     BP Readings from Last 3 Encounters:   17 146/73   17 144/76   17 145/74     Wt Readings from Last 2 Encounters:   17 202 lb 9.6 oz (91.899 kg)   16 214 lb (97.07 kg)     ASSESSMENT:  Hgb:at goal - received dose in clinic - recommend continue current regimen  TSat: Due for iron studies 4 weeks after last IV iron infusion Ferritin: Due for iron studies 4 weeks after last IV iron infusion    PLAN:  RTC for hgb then aranesp if needed in 2 week(s)  Appt scheduled for 2/15.  Sent message to schedulers to add comment to appt.   Iron studies due with next labs    Orders needed to be renewed (for next follow-up date) in [facility]: None    Iron labs due:      Plan discussed with:  No call made, appt scheduled  Plan provided by:  Zelda    NEXT FOLLOW-UP DATE:  2/15    Anemia Management Service  Ellis DumontD and Rosa Marcial CPhT  Phone: [phone]  Fax: [phone]

## 2017-02-13 DIAGNOSIS — M10.372 ACUTE GOUT DUE TO RENAL IMPAIRMENT INVOLVING LEFT FOOT: ICD-10-CM

## 2017-02-13 RX ORDER — PREDNISONE 10 MG/1
TABLET ORAL
Qty: 25 TABLET | Refills: 1 | Status: CANCELLED | OUTPATIENT
Start: 2017-02-13

## 2017-02-13 NOTE — TELEPHONE ENCOUNTER
DL review:  Pt has a gout flareup and asking for refill of prednisone. I can have him do an E visit encounter. Would that work.?  His phone connection was poor.  Nieves Cash RN

## 2017-02-13 NOTE — TELEPHONE ENCOUNTER
ORDER FOR PREDNISONE 10 MG.  IT IS HISTORICAL AND I AM NOT SURE WHICH ONE TO ORDER.  SIG: TAKE 2 TABLETS BY MOUTH DAILY X 1 WEEK, TAKE 1 TABLET BY MOUTH DAILY FOR 1 WEEK, THEN 1/2 TABLET BY MOUTH DAILY X 8 DAYS.  THANKS

## 2017-02-14 NOTE — TELEPHONE ENCOUNTER
Left message for patient statin. Refill request to be addressed either in office visit or Evisit  2. Call back with any questions or concerns    REMI CordonN, RN

## 2017-02-15 ENCOUNTER — ALLIED HEALTH/NURSE VISIT (OUTPATIENT)
Dept: TRANSPLANT | Facility: CLINIC | Age: 62
End: 2017-02-15
Attending: INTERNAL MEDICINE
Payer: COMMERCIAL

## 2017-02-15 ENCOUNTER — TELEPHONE (OUTPATIENT)
Dept: PHARMACY | Facility: CLINIC | Age: 62
End: 2017-02-15

## 2017-02-15 ENCOUNTER — OFFICE VISIT (OUTPATIENT)
Dept: FAMILY MEDICINE | Facility: CLINIC | Age: 62
End: 2017-02-15
Payer: COMMERCIAL

## 2017-02-15 VITALS
HEIGHT: 69 IN | DIASTOLIC BLOOD PRESSURE: 66 MMHG | TEMPERATURE: 97.4 F | BODY MASS INDEX: 29.03 KG/M2 | HEART RATE: 78 BPM | WEIGHT: 196 LBS | SYSTOLIC BLOOD PRESSURE: 122 MMHG | OXYGEN SATURATION: 99 %

## 2017-02-15 DIAGNOSIS — N18.5 CKD (CHRONIC KIDNEY DISEASE) STAGE 5, GFR LESS THAN 15 ML/MIN (H): Primary | ICD-10-CM

## 2017-02-15 DIAGNOSIS — N18.5 ANEMIA IN STAGE 5 CHRONIC KIDNEY DISEASE (H): ICD-10-CM

## 2017-02-15 DIAGNOSIS — N18.5 CKD (CHRONIC KIDNEY DISEASE) STAGE 5, GFR LESS THAN 15 ML/MIN (H): ICD-10-CM

## 2017-02-15 DIAGNOSIS — D63.1 ANEMIA IN STAGE 5 CHRONIC KIDNEY DISEASE (H): ICD-10-CM

## 2017-02-15 DIAGNOSIS — M10.379 ACUTE GOUT DUE TO RENAL IMPAIRMENT INVOLVING TOE, UNSPECIFIED LATERALITY: Primary | ICD-10-CM

## 2017-02-15 PROBLEM — D50.9 ANEMIA, IRON DEFICIENCY: Status: ACTIVE | Noted: 2017-02-15

## 2017-02-15 LAB
FERRITIN SERPL-MCNC: 432 NG/ML (ref 26–388)
HCT VFR BLD AUTO: 32.2 % (ref 40–53)
HGB BLD-MCNC: 10.5 G/DL (ref 13.3–17.7)
IRON SATN MFR SERPL: 13 % (ref 15–46)
IRON SERPL-MCNC: 28 UG/DL (ref 35–180)
TIBC SERPL-MCNC: 215 UG/DL (ref 240–430)

## 2017-02-15 PROCEDURE — 85018 HEMOGLOBIN: CPT | Performed by: INTERNAL MEDICINE

## 2017-02-15 PROCEDURE — 82728 ASSAY OF FERRITIN: CPT | Performed by: INTERNAL MEDICINE

## 2017-02-15 PROCEDURE — 85014 HEMATOCRIT: CPT | Performed by: INTERNAL MEDICINE

## 2017-02-15 PROCEDURE — 99213 OFFICE O/P EST LOW 20 MIN: CPT | Performed by: INTERNAL MEDICINE

## 2017-02-15 PROCEDURE — 36415 COLL VENOUS BLD VENIPUNCTURE: CPT | Performed by: INTERNAL MEDICINE

## 2017-02-15 PROCEDURE — 40000269 ZZH STATISTIC NO CHARGE FACILITY FEE: Mod: ZF

## 2017-02-15 PROCEDURE — 83540 ASSAY OF IRON: CPT | Performed by: INTERNAL MEDICINE

## 2017-02-15 PROCEDURE — 83550 IRON BINDING TEST: CPT | Performed by: INTERNAL MEDICINE

## 2017-02-15 RX ORDER — PREDNISONE 10 MG/1
10 TABLET ORAL DAILY
Qty: 25 TABLET | Refills: 0 | Status: SHIPPED | OUTPATIENT
Start: 2017-02-15 | End: 2017-04-08

## 2017-02-15 RX ORDER — PREDNISONE 20 MG/1
10 TABLET ORAL DAILY
Qty: 5 TABLET | Refills: 0 | Status: SHIPPED | OUTPATIENT
Start: 2017-02-15 | End: 2017-04-08

## 2017-02-15 NOTE — PROGRESS NOTES
SUBJECTIVE:  Murray Nicholson, a 62 year old male scheduled an appointment to discuss the following issues:  Chief Complaint   Patient presents with     Arthritis     suffering from possible gout in feet. Started on Saturday.      Assuming has gout  Intense pain  Hard to walk  [employer]ail work.    No redness/swelling    On treatment fro gout  Had prednisone taper over 3 wk    Pain free until few days ago.    Tried allopurinol made it worse.    Uric Acid 11.6 (H) 3.5 - 7.2         Component      Latest Ref Rng & Units 1/4/2017   Sodium      133 - 144 mmol/L 142   Potassium      3.4 - 5.3 mmol/L 4.0   Chloride      94 - 109 mmol/L 113 (H)   Carbon Dioxide      20 - 32 mmol/L 16 (L)   Anion Gap      3 - 14 mmol/L 13   Glucose      70 - 99 mg/dL 110 (H)   Urea Nitrogen      7 - 30 mg/dL 88 (H)   Creatinine      0.66 - 1.25 mg/dL 5.09 (H)   GFR Estimate      >60 mL/min/1.7m2 12 (L)   GFR Estimate If Black      >60 mL/min/1.7m2 14 (L)   Calcium      8.5 - 10.1 mg/dL 8.1 (L)   Phosphorus      2.5 - 4.5 mg/dL 5.3 (H)   Albumin      3.4 - 5.0 g/dL 3.0 (L)         Current Outpatient Prescriptions on File Prior to Visit:  darbepoetin emily (ARANESP, ALBUMIN FREE,) 60 MCG/0.3ML injection Inject 0.3 mLs (60 mcg) Subcutaneous every 14 days As needed for hgb<10g/dL. If Hgb increases >1 point in 2 weeks (if blood transfusion given, use hgb PRIOR to this), SYSTOLIC BP > 180 mmHg or hgb>=10g/dL, HOLD DOSE. Dose must be within 1 week of Hgb.  Per anemia protocol with Brice Caraballo MD   furosemide (LASIX) 80 MG tablet Take 1 tablet (80 mg) by mouth 2 times daily   atorvastatin (LIPITOR) 40 MG tablet Take 1 tablet (40 mg) by mouth daily   hydrALAZINE (APRESOLINE) 50 MG tablet Take 1 tablet (50 mg) by mouth 3 times daily   ferrous sulfate (IRON) 325 (65 FE) MG tablet Take 1 tablet (325 mg) by mouth 3 times daily (with meals)   amLODIPine (NORVASC) 5 MG tablet 1 TABLET BID- generic ok   isosorbide mononitrate (IMDUR) 60 MG 24 hr tablet Take 1 tablet  "(60 mg) by mouth daily   metoprolol (TOPROL XL) 100 MG 24 hr tablet Take 1 tablet (100 mg) by mouth daily   lisinopril (PRINIVIL,ZESTRIL) 40 MG tablet Take 1 tablet by mouth. one twice daily   glipiZIDE (GLUCOTROL) 5 MG tablet Take 1 tablet by mouth. TAKE 1 TABLET BY MOUTH DAILY BEFORE A MEAL   potassium chloride SA (K-DUR,KLOR-CON M) 20 MEQ tablet Take 1 tablet (20 mEq) by mouth daily   azelastine-fluticasone (DYMISTA) 137-50 MCG/ACT nasal spray Spray 1 spray into both nostrils 2 times daily   Cholecalciferol (VITAMIN D3 PO) Take 5,000 Units by mouth daily   loratadine (CLARITIN) 10 MG tablet Take 10 mg by mouth as needed.    omeprazole (PRILOSEC) 40 MG capsule Take 20 mg by mouth daily    ASPIRIN 81 MG OR TABS 1 TABLET DAILY   PREDNISONE PO Take 10 mg by mouth daily   albuterol (PROAIR HFA/PROVENTIL HFA/VENTOLIN HFA) 108 (90 BASE) MCG/ACT Inhaler Inhale 2 puffs into the lungs every 6 hours as needed for shortness of breath / dyspnea or wheezing (Patient not taking: Reported on 2/15/2017)   azelastine-fluticasone (DYMISTA) 137-50 MCG/ACT nasal spray Spray 1 spray into both nostrils 2 times daily   UNABLE TO FIND MEDICATION NAME: airborne prn.      Current Facility-Administered Medications on File Prior to Visit:  bupivacaine 0.25 % - EPINEPHrine 1:200,000 injection         Medical, social, surgical, and family histories reviewed and updated as of 2/15/2017.  OBJECTIVE:  /66  Pulse 78  Temp 97.4  F (36.3  C) (Oral)  Ht 5' 9\" (1.753 m)  Wt 196 lb (88.9 kg)  SpO2 99%  BMI 28.94 kg/m2  EXAM:  GENERAL APPEARANCE: healthy, alert and no distress  MS:   bilateral feet  No signif swelling or redness noted  Palpation of L 1st MCT >>> pain than right MCT.    ASSESSMENT/PLAN:    ASSESSMENT/PLAN:      ICD-10-CM    1. Acute gout due to renal impairment involving toe, unspecified laterality M10.379 predniSONE (DELTASONE) 20 MG tablet     predniSONE (DELTASONE) 10 MG tablet       Patient Instructions     Prednisone taper " refilled.      Ask your renal pharmacist about colchicine.  Consider whether allopurinol was given during flare.  Consider trial as having frequent flares and would need to be gout free.  And check for renal dosing.    Discuss with renal doctor on 3/15/2017.  Check uric acid level then.      Gout Diet  Gout is a painful condition caused by an excess of uric acid, a waste product made by the body. Uric acid forms crystals that collect in the joints. This brings on symptoms of joint pain and swelling. This is called a gout attack. Often, medications and diet changes are combined to manage gout. Below are some guidelines for changing your diet to help you manage gout and prevent attacks. Your health care provider will help you determine the best eating plan for you.     Limiting or avoiding certain foods can help prevent gout attacks.   Eating to manage gout  Weight loss for those who are overweight may help reduce gout attacks.  Eat less of these foods  Eating too many foods containing purines may raise the levels of uric acid in your body. This raises your risk for a gout attack. Try to limit these foods and drinks:    Alcohol, such as beer and red wine. You may be told to avoid alcohol completely.    Soft drinks that contain sugar or high fructose corn syrup    Certain fish, including anchovies, sardines, fish eggs, and herring    Certain meats, such as red meat, hot dogs, luncheon meats, and turkey    Organ meats, such as liver, kidneys, and sweetbreads    Legumes, such as dried beans and peas    Mushrooms, spinach, asparagus, and cauliflower    Other high fat foods such as gravy, whole milk, and high fat cheeses  Eat more of these foods  Other foods may be helpful for people with gout. Add some of these foods to your diet:    Dark berries, such as blueberries, blackberries, and cherries. These contain chemicals that may lower uric acid.    Tofu, a source of protein made from soy. Studies have shown that it may be  a better choice than meat for people with gout.    Omega fatty acids. These are found in some fatty fish such as salmon, certain oils (flax, olive, or nut), and nuts themselves. Omega fatty acids may help prevent inflammation due to gout.    Dairy products that are low-fat or fat-free, such as cheese and yogurt    Complex carbohydrate foods, including whole grains, brown rice, oats, and beans    Coffee, in moderation  Follow-up care  Follow up with your health care provider, or as advised.  When to seek medical advice  Call your health care provider right away if any of these occur:    Return of gout symptoms, usually at night:    Severe pain, swelling, and heat in a joint, especially the base of the big toe    Affected joint is hard to move    Skin of the affected joint is purple or red    Fever of 100.4 F (38 C) or higher    Pain that doesn't get better even with prescribed medicine     3153-0232 [employer]. [address] [place] [address]. All rights reserved. This information is not intended as a substitute for professional medical care. Always follow your healthcare professional's instructions.        Eating to Prevent Gout  Gout is a painful form of arthritis caused by an excess of uric acid. This is a waste product made by the body. It builds up in the body and forms crystals that collect in the joints, bringing on a gout attack. Alcohol and certain foods can trigger a gout attack. Below are some guidelines for changing your diet to help you manage gout. Your healthcare provider can work with you to determine the best eating plan for you. Know that diet is only one part of managing gout. Take your medicines as prescribed and follow the other guidelines your healthcare provider has given you.  Foods to limit  Eating too many foods containing purines may increase the levels of uric acid in your body and increase your risk for a gout attack. It may be best to limit these high-purine  foods:    Alcohol (beer, red wine). You may be told to avoid alcohol completely.    Certain fish (anchovies, sardines, fish roes, herring, tuna, mussels, codfish, scallops, trout, and nirali)    Certain meats (red meat, processed meat, brown, turkey, wild game, and goose)    Sauces and gravies made with meat    Organ meats (such as liver, kidneys, sweetbreads, and tripe)    Legumes (such as dried beans, peas)    Mushrooms, spinach, asparagus, and cauliflower    Yeast and yeast extract supplements  Foods to try  Some foods may be helpful for people with gout. You may want to try adding some of the following foods to your diet:    Dark berries: These include blueberries, blackberries, and cherries. These berries contain chemicals that may lower uric acid.    Tofu: Tofu, which is made from soy, is a good source of protein. Studies have shown that it may be a better choice than meat for people with gout.    Omega fatty acids: These acids are found in fatty fish (such as salmon), certain oils (such as flax, olive, or nut oils), or nuts. They may help prevent inflammation due to gout.  The following guidelines are recommended by the American Medical Association for people with gout. Your diet should be:    High in fiber, whole grains, fruits, and vegetables.    Low in protein (15% of calories should come from protein. Choose lean sources such as soy, lean meats, and poultry).    Low in fat (no more than 30% of calories should come from fat, with only 10% coming from animal fat).     1199-6222 [employer]. [address] [place] [address]. All rights reserved. This information is not intended as a substitute for professional medical care. Always follow your healthcare professional's instructions.              Ирина Nieves MD

## 2017-02-15 NOTE — PATIENT INSTRUCTIONS
Prednisone taper refilled.      Ask your renal pharmacist about colchicine.  Consider whether allopurinol was given during flare.  Consider trial as having frequent flares and would need to be gout free.  And check for renal dosing.    Discuss with renal doctor on 3/15/2017.  Check uric acid level then.      Gout Diet  Gout is a painful condition caused by an excess of uric acid, a waste product made by the body. Uric acid forms crystals that collect in the joints. This brings on symptoms of joint pain and swelling. This is called a gout attack. Often, medications and diet changes are combined to manage gout. Below are some guidelines for changing your diet to help you manage gout and prevent attacks. Your health care provider will help you determine the best eating plan for you.     Limiting or avoiding certain foods can help prevent gout attacks.   Eating to manage gout  Weight loss for those who are overweight may help reduce gout attacks.  Eat less of these foods  Eating too many foods containing purines may raise the levels of uric acid in your body. This raises your risk for a gout attack. Try to limit these foods and drinks:    Alcohol, such as beer and red wine. You may be told to avoid alcohol completely.    Soft drinks that contain sugar or high fructose corn syrup    Certain fish, including anchovies, sardines, fish eggs, and herring    Certain meats, such as red meat, hot dogs, luncheon meats, and turkey    Organ meats, such as liver, kidneys, and sweetbreads    Legumes, such as dried beans and peas    Mushrooms, spinach, asparagus, and cauliflower    Other high fat foods such as gravy, whole milk, and high fat cheeses  Eat more of these foods  Other foods may be helpful for people with gout. Add some of these foods to your diet:    Dark berries, such as blueberries, blackberries, and cherries. These contain chemicals that may lower uric acid.    Tofu, a source of protein made from soy. Studies have  shown that it may be a better choice than meat for people with gout.    Omega fatty acids. These are found in some fatty fish such as salmon, certain oils (flax, olive, or nut), and nuts themselves. Omega fatty acids may help prevent inflammation due to gout.    Dairy products that are low-fat or fat-free, such as cheese and yogurt    Complex carbohydrate foods, including whole grains, brown rice, oats, and beans    Coffee, in moderation  Follow-up care  Follow up with your health care provider, or as advised.  When to seek medical advice  Call your health care provider right away if any of these occur:    Return of gout symptoms, usually at night:    Severe pain, swelling, and heat in a joint, especially the base of the big toe    Affected joint is hard to move    Skin of the affected joint is purple or red    Fever of 100.4 F (38 C) or higher    Pain that doesn't get better even with prescribed medicine     5213-4558 The [employer]. [address] [place] [address]. All rights reserved. This information is not intended as a substitute for professional medical care. Always follow your healthcare professional's instructions.        Eating to Prevent Gout  Gout is a painful form of arthritis caused by an excess of uric acid. This is a waste product made by the body. It builds up in the body and forms crystals that collect in the joints, bringing on a gout attack. Alcohol and certain foods can trigger a gout attack. Below are some guidelines for changing your diet to help you manage gout. Your healthcare provider can work with you to determine the best eating plan for you. Know that diet is only one part of managing gout. Take your medicines as prescribed and follow the other guidelines your healthcare provider has given you.  Foods to limit  Eating too many foods containing purines may increase the levels of uric acid in your body and increase your risk for a gout attack. It may be best to limit  these high-purine foods:    Alcohol (beer, red wine). You may be told to avoid alcohol completely.    Certain fish (anchovies, sardines, fish roes, herring, tuna, mussels, codfish, scallops, trout, and nirali)    Certain meats (red meat, processed meat, brown, turkey, wild game, and goose)    Sauces and gravies made with meat    Organ meats (such as liver, kidneys, sweetbreads, and tripe)    Legumes (such as dried beans, peas)    Mushrooms, spinach, asparagus, and cauliflower    Yeast and yeast extract supplements  Foods to try  Some foods may be helpful for people with gout. You may want to try adding some of the following foods to your diet:    Dark berries: These include blueberries, blackberries, and cherries. These berries contain chemicals that may lower uric acid.    Tofu: Tofu, which is made from soy, is a good source of protein. Studies have shown that it may be a better choice than meat for people with gout.    Omega fatty acids: These acids are found in fatty fish (such as salmon), certain oils (such as flax, olive, or nut oils), or nuts. They may help prevent inflammation due to gout.  The following guidelines are recommended by the American Medical Association for people with gout. Your diet should be:    High in fiber, whole grains, fruits, and vegetables.    Low in protein (15% of calories should come from protein. Choose lean sources such as soy, lean meats, and poultry).    Low in fat (no more than 30% of calories should come from fat, with only 10% coming from animal fat).     5803-2567 [employer]. [address] [place] [address]. All rights reserved. This information is not intended as a substitute for professional medical care. Always follow your healthcare professional's instructions.

## 2017-02-15 NOTE — MR AVS SNAPSHOT
After Visit Summary   2/15/2017    Murray Nicholson    MRN: [id number]           Patient Information     Date Of Birth          1955        Visit Information        Provider Department      2/15/2017 9:30 AM Nurse, [facility]        Today's Diagnoses     CKD (chronic kidney disease) stage 5, GFR less than 15 ml/min (H)    -  1    Anemia in stage 5 chronic kidney disease (H)           Follow-ups after your visit        Your next 10 appointments already scheduled     Feb 15, 2017  3:00 PM CST   SHORT with Ирина Nieves MD   [facility] ([facility])    [address] [place] [id number]   [phone]            Mar 15, 2017  1:00 PM CDT   Lab with [facility]   [facility] ([facility])    [address] [place] [id number] [phone]            Mar 15, 2017  2:00 PM CDT   (Arrive by 1:30 PM)   Return Visit with Brice Caraballo MD   [facility] ([facility])    [address] [place] [address]  3rd [facility] [address] [phone]              Who to contact     If you have questions or need follow up information about today's clinic visit or your schedule please contact [facility] directly at [phone].  Normal or non-critical lab and imaging results will be communicated to you by MyChart, letter or phone within 4 business days after the clinic has received the results. If you do not hear from us within 7 days, please contact the clinic through [employer] or phone. If you have a critical or abnormal lab result, we will notify you by phone as soon as possible.  Submit refill requests through [employer] or call your pharmacy and they will forward the refill request to us. Please allow 3 business days for your refill to be completed.          Additional Information About Your Visit        MyChart Information     [employer]  gives you secure access to your electronic health record. If you see a primary care provider, you can also send messages to your care team and make appointments. If you have questions, please call your primary care clinic.  If you do not have a primary care provider, please call [phone] and they will assist you.        Care EveryWhere ID     This is your Care EveryWhere ID. This could be used by other organizations to access your [place] medical records  [id number]         Blood Pressure from Last 3 Encounters:   01/31/17 146/73   01/31/17 144/76   01/11/17 145/74    Weight from Last 3 Encounters:   01/11/17 91.9 kg (202 lb 9.6 oz)   12/01/16 97.1 kg (214 lb)   11/29/16 99.8 kg (220 lb)              Today, you had the following     No orders found for display       Primary Care Provider Office Phone # Fax #    Yahir Turcios [phone]       [address] [address]        Thank you!     Thank you for choosing [facility]  for your care. Our goal is always to provide you with excellent care. Hearing back from our patients is one way we can continue to improve our services. Please take a few minutes to complete the written survey that you may receive in the mail after your visit with us. Thank you!             Your Updated Medication List - Protect others around you: Learn how to safely use, store and throw away your medicines at www.disposemymeds.org.          This list is accurate as of: 2/15/17 12:11 PM.  Always use your most recent med list.                   Brand Name Dispense Instructions for use    albuterol 108 (90 BASE) MCG/ACT Inhaler    PROAIR HFA/PROVENTIL HFA/VENTOLIN HFA    1 Inhaler    Inhale 2 puffs into the lungs every 6 hours as needed for shortness of breath / dyspnea or wheezing       amLODIPine 5 MG tablet    NORVASC    180 tablet    1 TABLET BID- generic ok       aspirin 81 MG tablet      1 TABLET DAILY        atorvastatin 40 MG tablet    LIPITOR    90 tablet    Take 1 tablet (40 mg) by mouth daily       * azelastine-fluticasone 137-50 MCG/ACT nasal spray    DYMISTA     Spray 1 spray into both nostrils 2 times daily       * azelastine-fluticasone 137-50 MCG/ACT nasal spray    DYMISTA    23 g    Spray 1 spray into both nostrils 2 times daily       darbepoetin emily 60 MCG/0.3ML injection    ARANESP (ALBUMIN FREE)    0.3 mL    Inject 0.3 mLs (60 mcg) Subcutaneous every 14 days As needed for hgb<10g/dL.  If Hgb increases >1 point in 2 weeks (if blood transfusion given, use hgb PRIOR to this), SYSTOLIC BP > 180 mmHg or hgb>=10g/dL, HOLD DOSE. Dose must be within 1 week of Hgb.  Per anemia protocol with Brice Caraballo MD       ferrous sulfate 325 (65 FE) MG tablet    IRON    200 tablet    Take 1 tablet (325 mg) by mouth 3 times daily (with meals)       furosemide 80 MG tablet    LASIX    90 tablet    Take 1 tablet (80 mg) by mouth 2 times daily       glipiZIDE 5 MG tablet    GLUCOTROL    90 tablet    Take 1 tablet by mouth. TAKE 1 TABLET BY MOUTH DAILY BEFORE A MEAL       hydrALAZINE 50 MG tablet    APRESOLINE    270 tablet    Take 1 tablet (50 mg) by mouth 3 times daily       isosorbide mononitrate 60 MG 24 hr tablet    IMDUR    90 tablet    Take 1 tablet (60 mg) by mouth daily       levofloxacin 750 MG tablet    LEVAQUIN    5 tablet    Take 1 tablet (750 mg) by mouth daily       lisinopril 40 MG tablet    PRINIVIL/ZESTRIL    180 tablet    Take 1 tablet by mouth. one twice daily       loratadine 10 MG tablet    CLARITIN     Take 10 mg by mouth as needed.       metoprolol 100 MG 24 hr tablet    TOPROL XL    90 tablet    Take 1 tablet (100 mg) by mouth daily       omeprazole 40 MG capsule    priLOSEC     Take 20 mg by mouth daily       potassium chloride SA 20 MEQ CR tablet    K-DUR/KLOR-CON M    90 tablet    Take 1 tablet (20 mEq) by mouth daily       PREDNISONE PO      Take 10 mg by mouth daily       UNABLE TO FIND       MEDICATION NAME: airborne prn.       VITAMIN D3 PO      Take 5,000 Units by mouth daily       * Notice:  This list has 2 medication(s) that are the same as other medications prescribed for you. Read the directions carefully, and ask your doctor or other care provider to review them with you.

## 2017-02-15 NOTE — MR AVS SNAPSHOT
After Visit Summary   2/15/2017    Murray Nicholson    MRN: [id number]           Patient Information     Date Of Birth          1955        Visit Information        Provider Department      2/15/2017 3:00 PM Ирина Nieves MD [facility]        Today's Diagnoses     Acute gout due to renal impairment involving toe, unspecified laterality    -  1      Care Instructions    Prednisone taper refilled.      Ask your renal pharmacist about colchicine.  Consider whether allopurinol was given during flare.  Consider trial as having frequent flares and would need to be gout free.  And check for renal dosing.    Discuss with renal doctor on 3/15/2017.  Check uric acid level then.      Gout Diet  Gout is a painful condition caused by an excess of uric acid, a waste product made by the body. Uric acid forms crystals that collect in the joints. This brings on symptoms of joint pain and swelling. This is called a gout attack. Often, medications and diet changes are combined to manage gout. Below are some guidelines for changing your diet to help you manage gout and prevent attacks. Your health care provider will help you determine the best eating plan for you.     Limiting or avoiding certain foods can help prevent gout attacks.   Eating to manage gout  Weight loss for those who are overweight may help reduce gout attacks.  Eat less of these foods  Eating too many foods containing purines may raise the levels of uric acid in your body. This raises your risk for a gout attack. Try to limit these foods and drinks:    Alcohol, such as beer and red wine. You may be told to avoid alcohol completely.    Soft drinks that contain sugar or high fructose corn syrup    Certain fish, including anchovies, sardines, fish eggs, and herring    Certain meats, such as red meat, hot dogs, luncheon meats, and turkey    Organ meats, such as liver, kidneys, and sweetbreads    Legumes, such as dried beans and  peas    Mushrooms, spinach, asparagus, and cauliflower    Other high fat foods such as gravy, whole milk, and high fat cheeses  Eat more of these foods  Other foods may be helpful for people with gout. Add some of these foods to your diet:    Dark berries, such as blueberries, blackberries, and cherries. These contain chemicals that may lower uric acid.    Tofu, a source of protein made from soy. Studies have shown that it may be a better choice than meat for people with gout.    Omega fatty acids. These are found in some fatty fish such as salmon, certain oils (flax, olive, or nut), and nuts themselves. Omega fatty acids may help prevent inflammation due to gout.    Dairy products that are low-fat or fat-free, such as cheese and yogurt    Complex carbohydrate foods, including whole grains, brown rice, oats, and beans    Coffee, in moderation  Follow-up care  Follow up with your health care provider, or as advised.  When to seek medical advice  Call your health care provider right away if any of these occur:    Return of gout symptoms, usually at night:    Severe pain, swelling, and heat in a joint, especially the base of the big toe    Affected joint is hard to move    Skin of the affected joint is purple or red    Fever of 100.4 F (38 C) or higher    Pain that doesn't get better even with prescribed medicine     1441-6677 [employer]. [address] [place], [place] [id number]. All rights reserved. This information is not intended as a substitute for professional medical care. Always follow your healthcare professional's instructions.        Eating to Prevent Gout  Gout is a painful form of arthritis caused by an excess of uric acid. This is a waste product made by the body. It builds up in the body and forms crystals that collect in the joints, bringing on a gout attack. Alcohol and certain foods can trigger a gout attack. Below are some guidelines for changing your diet to help you manage gout. Your  healthcare provider can work with you to determine the best eating plan for you. Know that diet is only one part of managing gout. Take your medicines as prescribed and follow the other guidelines your healthcare provider has given you.  Foods to limit  Eating too many foods containing purines may increase the levels of uric acid in your body and increase your risk for a gout attack. It may be best to limit these high-purine foods:    Alcohol (beer, red wine). You may be told to avoid alcohol completely.    Certain fish (anchovies, sardines, fish roes, herring, tuna, mussels, codfish, scallops, trout, and nirali)    Certain meats (red meat, processed meat, brown, turkey, wild game, and goose)    Sauces and gravies made with meat    Organ meats (such as liver, kidneys, sweetbreads, and tripe)    Legumes (such as dried beans, peas)    Mushrooms, spinach, asparagus, and cauliflower    Yeast and yeast extract supplements  Foods to try  Some foods may be helpful for people with gout. You may want to try adding some of the following foods to your diet:    Dark berries: These include blueberries, blackberries, and cherries. These berries contain chemicals that may lower uric acid.    Tofu: Tofu, which is made from soy, is a good source of protein. Studies have shown that it may be a better choice than meat for people with gout.    Omega fatty acids: These acids are found in fatty fish (such as salmon), certain oils (such as flax, olive, or nut oils), or nuts. They may help prevent inflammation due to gout.  The following guidelines are recommended by the American Medical Association for people with gout. Your diet should be:    High in fiber, whole grains, fruits, and vegetables.    Low in protein (15% of calories should come from protein. Choose lean sources such as soy, lean meats, and poultry).    Low in fat (no more than 30% of calories should come from fat, with only 10% coming from animal fat).     4585-3340 [employer]. [address] [place] [address]. All rights reserved. This information is not intended as a substitute for professional medical care. Always follow your healthcare professional's instructions.              Follow-ups after your visit        Your next 10 appointments already scheduled     Mar 15, 2017  1:00 PM CDT   Lab with UC LAB   [facility] ([facility])    [address]  1st Floor  [facility] [address] [phone]            Mar 15, 2017  2:00 PM CDT   (Arrive by 1:30 PM)   Return Visit with Brice Caraballo MD   [facility] ([facility])    [address]  3rd [facility] [address] [phone]              Who to contact     If you have questions or need follow up information about today's clinic visit or your schedule please contact [facility] directly at [phone].  Normal or non-critical lab and imaging results will be communicated to you by [employer], letter or phone within 4 business days after the clinic has received the results. If you do not hear from us within 7 days, please contact the clinic through [employer] or phone. If you have a critical or abnormal lab result, we will notify you by phone as soon as possible.  Submit refill requests through [employer] or call your pharmacy and they will forward the refill request to us. Please allow 3 business days for your refill to be completed.          Additional Information About Your Visit        [employer] Information     [employer] gives you secure access to your electronic health record. If you see a primary care provider, you can also send messages to your care team and make appointments. If you have questions, please call your primary care clinic.  If you do not have a primary care provider, please call [phone] and they will assist you.        Care EveryWhere ID     This is your Care EveryWhere ID. This could  "be used by other organizations to access your [place] medical records  [id number]        Your Vitals Were     Pulse Temperature Height Pulse Oximetry BMI (Body Mass Index)       78 97.4  F (36.3  C) (Oral) 5' 9\" (1.753 m) 99% 28.94 kg/m2        Blood Pressure from Last 3 Encounters:   02/15/17 122/66   01/31/17 146/73   01/31/17 144/76    Weight from Last 3 Encounters:   02/15/17 196 lb (88.9 kg)   01/11/17 202 lb 9.6 oz (91.9 kg)   12/01/16 214 lb (97.1 kg)              Today, you had the following     No orders found for display         Today's Medication Changes          These changes are accurate as of: 2/15/17  3:35 PM.  If you have any questions, ask your nurse or doctor.               These medicines have changed or have updated prescriptions.        Dose/Directions    * PREDNISONE PO   Indication:  TAPER   This may have changed:  Another medication with the same name was added. Make sure you understand how and when to take each.        Dose:  10 mg   Take 10 mg by mouth daily   Refills:  0       * predniSONE 20 MG tablet   Commonly known as:  DELTASONE   This may have changed:  You were already taking a medication with the same name, and this prescription was added. Make sure you understand how and when to take each.   Used for:  Acute gout due to renal impairment involving toe, unspecified laterality   Changed by:  Ирина Nieves MD        Dose:  10 mg   Take 0.5 tablets (10 mg) by mouth daily 20mg daily for one week then 10mg daily for one week then 5 mg daily for 8 days then stop.   Quantity:  5 tablet   Refills:  0       * predniSONE 10 MG tablet   Commonly known as:  DELTASONE   This may have changed:  You were already taking a medication with the same name, and this prescription was added. Make sure you understand how and when to take each.   Used for:  Acute gout due to renal impairment involving toe, unspecified laterality   Changed by:  Ирина Nieves MD        Dose:  10 mg "   Take 1 tablet (10 mg) by mouth daily 20mg daily for one week then 10mg daily for one week then 5 mg daily for 8 days then stop.   Quantity:  25 tablet   Refills:  0       * Notice:  This list has 3 medication(s) that are the same as other medications prescribed for you. Read the directions carefully, and ask your doctor or other care provider to review them with you.      Stop taking these medicines if you haven't already. Please contact your care team if you have questions.     levofloxacin 750 MG tablet   Commonly known as:  LEVAQUIN   Stopped by:  Ирина Nieves MD                Where to get your medicines      These medications were sent to [id number] - [place], [place] - [address], [place] [address]     Phone:  [phone]     predniSONE 10 MG tablet    predniSONE 20 MG tablet                Primary Care Provider Office Phone # Fax #    Yahir Turcios [phone]       [address] [address]        Thank you!     Thank you for choosing [facility]  for your care. Our goal is always to provide you with excellent care. Hearing back from our patients is one way we can continue to improve our services. Please take a few minutes to complete the written survey that you may receive in the mail after your visit with us. Thank you!             Your Updated Medication List - Protect others around you: Learn how to safely use, store and throw away your medicines at www.disposemymeds.org.          This list is accurate as of: 2/15/17  3:35 PM.  Always use your most recent med list.                   Brand Name Dispense Instructions for use    albuterol 108 (90 BASE) MCG/ACT Inhaler    PROAIR HFA/PROVENTIL HFA/VENTOLIN HFA    1 Inhaler    Inhale 2 puffs into the lungs every 6 hours as needed for shortness of breath / dyspnea or wheezing       amLODIPine 5 MG tablet     NORVASC    180 tablet    1 TABLET BID- generic ok       aspirin 81 MG tablet      1 TABLET DAILY       atorvastatin 40 MG tablet    LIPITOR    90 tablet    Take 1 tablet (40 mg) by mouth daily       * azelastine-fluticasone 137-50 MCG/ACT nasal spray    DYMISTA     Spray 1 spray into both nostrils 2 times daily       * azelastine-fluticasone 137-50 MCG/ACT nasal spray    DYMISTA    23 g    Spray 1 spray into both nostrils 2 times daily       darbepoetin emily 60 MCG/0.3ML injection    ARANESP (ALBUMIN FREE)    0.3 mL    Inject 0.3 mLs (60 mcg) Subcutaneous every 14 days As needed for hgb<10g/dL.  If Hgb increases >1 point in 2 weeks (if blood transfusion given, use hgb PRIOR to this), SYSTOLIC BP > 180 mmHg or hgb>=10g/dL, HOLD DOSE. Dose must be within 1 week of Hgb.  Per anemia protocol with Brice Caraballo MD       ferrous sulfate 325 (65 FE) MG tablet    IRON    200 tablet    Take 1 tablet (325 mg) by mouth 3 times daily (with meals)       furosemide 80 MG tablet    LASIX    90 tablet    Take 1 tablet (80 mg) by mouth 2 times daily       glipiZIDE 5 MG tablet    GLUCOTROL    90 tablet    Take 1 tablet by mouth. TAKE 1 TABLET BY MOUTH DAILY BEFORE A MEAL       hydrALAZINE 50 MG tablet    APRESOLINE    270 tablet    Take 1 tablet (50 mg) by mouth 3 times daily       isosorbide mononitrate 60 MG 24 hr tablet    IMDUR    90 tablet    Take 1 tablet (60 mg) by mouth daily       lisinopril 40 MG tablet    PRINIVIL/ZESTRIL    180 tablet    Take 1 tablet by mouth. one twice daily       loratadine 10 MG tablet    CLARITIN     Take 10 mg by mouth as needed.       metoprolol 100 MG 24 hr tablet    TOPROL XL    90 tablet    Take 1 tablet (100 mg) by mouth daily       omeprazole 40 MG capsule    priLOSEC     Take 20 mg by mouth daily       potassium chloride SA 20 MEQ CR tablet    K-DUR/KLOR-CON M    90 tablet    Take 1 tablet (20 mEq) by mouth daily       * PREDNISONE PO      Take 10 mg by mouth daily       * predniSONE 20 MG  tablet    DELTASONE    5 tablet    Take 0.5 tablets (10 mg) by mouth daily 20mg daily for one week then 10mg daily for one week then 5 mg daily for 8 days then stop.       * predniSONE 10 MG tablet    DELTASONE    25 tablet    Take 1 tablet (10 mg) by mouth daily 20mg daily for one week then 10mg daily for one week then 5 mg daily for 8 days then stop.       UNABLE TO FIND      MEDICATION NAME: airborne prn.       VITAMIN D3 PO      Take 5,000 Units by mouth daily       * Notice:  This list has 5 medication(s) that are the same as other medications prescribed for you. Read the directions carefully, and ask your doctor or other care provider to review them with you.

## 2017-02-15 NOTE — TELEPHONE ENCOUNTER
Anemia Management Note  SUBJECTIVE/OBJECTIVE:  Referred by Dr. Brice Caraballo on 2015.  Primary Diagnosis: Anemia in Chronic Kidney Disease (N18.5 D63.1)   Secondary Diagnosis: Chronic Kidney Disease, Stage V (N18.5)  Hgb goal range: 9-10  Epo/Darbo: Aranesp 60 mcg every 2 weeks as needed for hgb<10g/dL - in clinic  RX order expires on 17  Iron regimen: Ferrous Sulfate TID  Lab orders  on 2017 In Epic  Anemia Latest Ref Rng & Units 2016 2016 2017 2017 2017 2/15/2017 2017   ANASTASIYA Dose - - 60 mcg 60 mcg - 60 mcg - -   Hemoglobin 13.3 - 17.7 g/dL - 7.6(L) 8.6(L) 9.9(L) 9.1(L) 10.5(L) -   IV Iron Dose - 750mg - - - - - 750mg   TSAT 15 - 46 % - - 18 - - 13(L) -   Ferritin 26 - 388 ng/mL - - 663(H) - - 432(H) -     BP Readings from Last 3 Encounters:   17 146/73   17 144/76   17 145/74     Wt Readings from Last 2 Encounters:   17 202 lb 9.6 oz (91.9 kg)   16 214 lb (97.1 kg)      documented IV iron infusion Sjw    ASSESSMENT:  Hgb: Above goal - recommend hold dose  TSat: not at goal of >30% Ferritin: At goal (>100ng/mL). Recommend dose IV iron    PLAN:  Entered therapy plan for injectafer 750mg x 1 dose.  He will call to schedule for next week  or   RTC for hgb then aranesp if needed in 2 week(s).  He will call to schedule this as well.   IV injectafer appt is scheduled for 17 at 3:00 pm   Next lab/aranesp appt is scheduled for 3/7/17 at 10:30    Orders needed to be renewed (for next follow-up date) in [facility]: None    Iron labs due:  4 weeks after IV iron     Plan discussed with:  Murray  Plan provided by:  Zelda    NEXT FOLLOW-UP DATE:  3/7    Anemia Management Service  Zelda Rich PharmD and Rosa Marcial CPhT  Phone: [phone]  Fax: [phone]

## 2017-02-15 NOTE — NURSING NOTE
"Chief Complaint   Patient presents with     Arthritis     suffering from possible gout in feet. Started on Saturday.        Initial /66  Pulse 78  Temp 97.4  F (36.3  C) (Oral)  Ht 5' 9\" (1.753 m)  Wt 196 lb (88.9 kg)  SpO2 99%  BMI 28.94 kg/m2 Estimated body mass index is 28.94 kg/(m^2) as calculated from the following:    Height as of this encounter: 5' 9\" (1.753 m).    Weight as of this encounter: 196 lb (88.9 kg).  Medication Reconciliation: complete  "

## 2017-02-22 DIAGNOSIS — D47.2 MGUS (MONOCLONAL GAMMOPATHY OF UNKNOWN SIGNIFICANCE): ICD-10-CM

## 2017-02-22 PROCEDURE — 84166 PROTEIN E-PHORESIS/URINE/CSF: CPT | Performed by: FAMILY MEDICINE

## 2017-02-23 LAB
ALBUMIN MFR UR ELPH: 73.1 %
ALPHA1 GLOB MFR UR ELPH: 2.5 %
ALPHA2 GLOB MFR UR ELPH: 2.5 %
B-GLOBULIN MFR UR ELPH: 6 %
GAMMA GLOB MFR UR ELPH: 15.9 %
M PROTEIN MFR UR ELPH: 0 %
PROT PATTERN UR ELPH-IMP: ABNORMAL

## 2017-02-28 ENCOUNTER — INFUSION THERAPY VISIT (OUTPATIENT)
Dept: INFUSION THERAPY | Facility: CLINIC | Age: 62
End: 2017-02-28
Attending: INTERNAL MEDICINE
Payer: COMMERCIAL

## 2017-02-28 VITALS
OXYGEN SATURATION: 100 % | TEMPERATURE: 97.8 F | DIASTOLIC BLOOD PRESSURE: 72 MMHG | RESPIRATION RATE: 16 BRPM | SYSTOLIC BLOOD PRESSURE: 142 MMHG | HEART RATE: 72 BPM

## 2017-02-28 DIAGNOSIS — D50.9 ANEMIA, IRON DEFICIENCY: Primary | ICD-10-CM

## 2017-02-28 PROCEDURE — 96365 THER/PROPH/DIAG IV INF INIT: CPT

## 2017-02-28 PROCEDURE — 25000128 H RX IP 250 OP 636: Mod: ZF | Performed by: INTERNAL MEDICINE

## 2017-02-28 RX ADMIN — FERRIC CARBOXYMALTOSE INJECTION 750 MG: 50 INJECTION, SOLUTION INTRAVENOUS at 15:23

## 2017-02-28 NOTE — PATIENT INSTRUCTIONS
Dear Murray Nicholson    Thank you for choosing [facility] ([facility]) for your infusion.  The following information is a summary of our appointment as well as important reminders.           We look forward in seeing you on your next appointment here at [facility].  Please don t hesitate to call us at [phone] to reschedule any of your appointments or to speak with one of the [facility] registered nurses.  It was a pleasure taking care of you today.    Sincerely,  Kate Bergman, TONY  [facility]  [address] [place] [address]  [address]  Phone:  [phone]

## 2017-02-28 NOTE — MR AVS SNAPSHOT
After Visit Summary   2/28/2017    Murray Nicholson    MRN: [id number]           Patient Information     Date Of Birth          1955        Visit Information        Provider Department      2/28/2017 3:00 PM UC 47 ATC; UC SPEC INFUSION [facility] Specialty and Procedure        Today's Diagnoses     Anemia, iron deficiency    -  1      Care Instructions    Dear Murray Nicholson    Thank you for choosing [facility] Special[facility] In[facility] Center ([facility]) for your infusion.  The following information is a summary of our appointment as well as important reminders.           We look forward in seeing you on your next appointment here at [facility].  Please don t hesitate to call us at [phone] to reschedule any of your appointments or to speak with one of the [facility] registered nurses.  It was a pleasure taking care of you today.    Sincerely,  Kate Bergman, RN  [facility]  Specialty Infusion & Procedure Center  [address] [place] [address]  [id number]  Phone:  [phone]          Follow-ups after your visit        Your next 10 appointments already scheduled     Mar 07, 2017 10:30 AM CST   Lab with  LAB   [facility] ([facility])    [address] [place] [id number] [phone]            Mar 07, 2017 11:00 AM CST   (Arrive by 10:45 AM)   INJECTION with  Txc Nurse   [facility] ([facility])    [address] [place] [id number] [phone]            Mar 15, 2017  1:00 PM CDT   Lab with  LAB   [facility] ([facility])    [address] [place] [id number] [phone]            Mar 15, 2017  2:00 PM CDT   (Arrive by 1:30 PM)   Return Visit with Brice Caraballo MD   [facility]ph[facility] ([facility])    [address] [place] [address]  [address] [facility] [address] [phone]              Who to contact     If you have questions or need follow up information about today's clinic visit or your schedule please contact [facility] CENTER SPECIALTY AND PROCEDURE directly at [phone].  Normal or non-critical lab and imaging results will be communicated to you by MyChart, letter or phone within 4 business days after the clinic has received the results. If you do not hear from us within 7 days, please contact the clinic through [employer] or phone. If you have a critical or abnormal lab result, we will notify you by phone as soon as possible.  Submit refill requests through [employer] or call your pharmacy and they will forward the refill request to us. Please allow 3 business days for your refill to be completed.          Additional Information About Your Visit        MyChart Information     [employer] gives you secure access to your electronic health record. If you see a primary care provider, you can also send messages to your care team and make appointments. If you have questions, please call your primary care clinic.  If you do not have a primary care provider, please call [phone] and they will assist you.        Care EveryWhere ID     This is your Care EveryWhere ID. This could be used by other organizations to access your [facility] medical records  [id number]        Your Vitals Were     Pulse Temperature Respirations Pulse Oximetry          70 97.8  F (36.6  C) (Oral) 16 100%         Blood Pressure from Last 3 Encounters:   02/28/17 135/70   02/15/17 122/66   01/31/17 146/73    Weight from Last 3 Encounters:   02/15/17 88.9 kg (196 lb)   01/11/17 91.9 kg (202 lb 9.6 oz)   12/01/16 97.1 kg (214 lb)              We Performed the Following     Treatment Conditions        Primary Care Provider Office Phone # Fax #    Yahir Turcios [phone]       [facility] [address]    [place] [address]        Thank you!     Thank you for choosing [facility] TREATMENT CENTER SPECIALTY AND PROCEDURE  for your care. Our goal is always to provide you with excellent care. Hearing back from our patients is one way we can continue to improve our services. Please take a few minutes to complete the written survey that you may receive in the mail after your visit with us. Thank you!             Your Updated Medication List - Protect others around you: Learn how to safely use, store and throw away your medicines at www.disposemymeds.org.          This list is accurate as of: 2/28/17  3:38 PM.  Always use your most recent med list.                   Brand Name Dispense Instructions for use    albuterol 108 (90 BASE) MCG/ACT Inhaler    PROAIR HFA/PROVENTIL HFA/VENTOLIN HFA    1 Inhaler    Inhale 2 puffs into the lungs every 6 hours as needed for shortness of breath / dyspnea or wheezing       amLODIPine 5 MG tablet    NORVASC    180 tablet    1 TABLET BID- generic ok       aspirin 81 MG tablet      1 TABLET DAILY       atorvastatin 40 MG tablet    LIPITOR    90 tablet    Take 1 tablet (40 mg) by mouth daily       * azelastine-fluticasone 137-50 MCG/ACT nasal spray    DYMISTA     Spray 1 spray into both nostrils 2 times daily       * azelastine-fluticasone 137-50 MCG/ACT nasal spray    DYMISTA    23 g    Spray 1 spray into both nostrils 2 times daily       darbepoetin emily 60 MCG/0.3ML injection    ARANESP (ALBUMIN FREE)    0.3 mL    Inject 0.3 mLs (60 mcg) Subcutaneous every 14 days As needed for hgb<10g/dL.  If Hgb increases >1 point in 2 weeks (if blood transfusion given, use hgb PRIOR to this), SYSTOLIC BP > 180 mmHg or hgb>=10g/dL, HOLD DOSE. Dose must be within 1 week of Hgb.  Per anemia protocol with Brice Caraballo MD       ferrous sulfate 325 (65 FE) MG tablet    IRON    200 tablet    Take 1 tablet (325 mg) by mouth 3 times daily (with meals)       furosemide 80 MG tablet    LASIX    90 tablet    Take  1 tablet (80 mg) by mouth 2 times daily       glipiZIDE 5 MG tablet    GLUCOTROL    90 tablet    Take 1 tablet by mouth. TAKE 1 TABLET BY MOUTH DAILY BEFORE A MEAL       hydrALAZINE 50 MG tablet    APRESOLINE    270 tablet    Take 1 tablet (50 mg) by mouth 3 times daily       isosorbide mononitrate 60 MG 24 hr tablet    IMDUR    90 tablet    Take 1 tablet (60 mg) by mouth daily       lisinopril 40 MG tablet    PRINIVIL/ZESTRIL    180 tablet    Take 1 tablet by mouth. one twice daily       loratadine 10 MG tablet    CLARITIN     Take 10 mg by mouth as needed.       metoprolol 100 MG 24 hr tablet    TOPROL XL    90 tablet    Take 1 tablet (100 mg) by mouth daily       omeprazole 40 MG capsule    priLOSEC     Take 20 mg by mouth daily       potassium chloride SA 20 MEQ CR tablet    K-DUR/KLOR-CON M    90 tablet    Take 1 tablet (20 mEq) by mouth daily       * PREDNISONE PO      Take 10 mg by mouth daily       * predniSONE 20 MG tablet    DELTASONE    5 tablet    Take 0.5 tablets (10 mg) by mouth daily 20mg daily for one week then 10mg daily for one week then 5 mg daily for 8 days then stop.       * predniSONE 10 MG tablet    DELTASONE    25 tablet    Take 1 tablet (10 mg) by mouth daily 20mg daily for one week then 10mg daily for one week then 5 mg daily for 8 days then stop.       UNABLE TO FIND      MEDICATION NAME: airborne prn.       VITAMIN D3 PO      Take 5,000 Units by mouth daily       * Notice:  This list has 5 medication(s) that are the same as other medications prescribed for you. Read the directions carefully, and ask your doctor or other care provider to review them with you.

## 2017-02-28 NOTE — PROGRESS NOTES
Nursing Note  Murray Nicholson presents [facility] for:   Chief Complaint   Patient presents with     Infusion     Injectafer infusion     During today's [facility] appointment, orders from Dr. Brice Caraballo were completed.  Frequency: today is dose 2 of 2 total.    Progress note:  Patient identification verified by name and date of birth.  Assessment completed.  Vitals recorded in Doc Flowsheets.  Patient was provided with education regarding infusion and possible side effects.  Patient verbalized understanding.      needed: No  Premedications: were not ordered.  Infusion Rates: infusion given over approximately 15 min.  Approximate Infusion length:15 minutes.   Labs: were not ordered for this appointment.  Vascular access: peripheral IV placed today.  Treatment Conditions: non-applicable.  Patient tolerated infusion: well.        Discharge Plan:   Follow up plan of care with: primary medical doctor.  Discharge instructions were reviewed with patient.  Patient/representative verbalized understanding of discharge instructions and all questions answered.  Patient discharged from [facility] in stable condition.    Kate Bergman RN    Administrations This Visit     ferric carboxymaltose (INJECTAFER) 750 mg in NaCl 0.9 % 100 mL intermittent infusion     Admin Date Action Dose Rate Route Administered By          02/28/2017 New Bag 750 mg 500 mL/hr Intravenous Kate Bergman RN                         /72  Pulse 72  Temp 97.8  F (36.6  C) (Oral)  Resp 16  SpO2 100%

## 2017-03-07 ENCOUNTER — TELEPHONE (OUTPATIENT)
Dept: PHARMACY | Facility: CLINIC | Age: 62
End: 2017-03-07

## 2017-03-07 ENCOUNTER — ALLIED HEALTH/NURSE VISIT (OUTPATIENT)
Dept: TRANSPLANT | Facility: CLINIC | Age: 62
End: 2017-03-07
Attending: INTERNAL MEDICINE
Payer: COMMERCIAL

## 2017-03-07 VITALS — HEART RATE: 56 BPM | DIASTOLIC BLOOD PRESSURE: 78 MMHG | SYSTOLIC BLOOD PRESSURE: 158 MMHG

## 2017-03-07 DIAGNOSIS — N18.5 CKD (CHRONIC KIDNEY DISEASE) STAGE 5, GFR LESS THAN 15 ML/MIN (H): ICD-10-CM

## 2017-03-07 DIAGNOSIS — N18.5 ANEMIA IN STAGE 5 CHRONIC KIDNEY DISEASE (H): ICD-10-CM

## 2017-03-07 DIAGNOSIS — D63.1 ANEMIA IN STAGE 5 CHRONIC KIDNEY DISEASE (H): ICD-10-CM

## 2017-03-07 DIAGNOSIS — N18.5 CKD (CHRONIC KIDNEY DISEASE) STAGE 5, GFR LESS THAN 15 ML/MIN (H): Primary | ICD-10-CM

## 2017-03-07 LAB
HCT VFR BLD AUTO: 29.2 % (ref 40–53)
HGB BLD-MCNC: 9.6 G/DL (ref 13.3–17.7)

## 2017-03-07 PROCEDURE — 63400005 ZZH RX 634: Mod: ZF

## 2017-03-07 PROCEDURE — 96372 THER/PROPH/DIAG INJ SC/IM: CPT | Mod: ZF

## 2017-03-07 PROCEDURE — 40000269 ZZH STATISTIC NO CHARGE FACILITY FEE: Mod: ZF

## 2017-03-07 PROCEDURE — 85018 HEMOGLOBIN: CPT | Performed by: INTERNAL MEDICINE

## 2017-03-07 PROCEDURE — 85014 HEMATOCRIT: CPT | Performed by: INTERNAL MEDICINE

## 2017-03-07 PROCEDURE — 36415 COLL VENOUS BLD VENIPUNCTURE: CPT | Performed by: INTERNAL MEDICINE

## 2017-03-07 NOTE — MR AVS SNAPSHOT
After Visit Summary   3/7/2017    Murray Nicholson    MRN: [id number]           Patient Information     Date Of Birth          1955        Visit Information        Provider Department      3/7/2017 11:00 AM [facility]        Today's Diagnoses     CKD (chronic kidney disease) stage 5, GFR less than 15 ml/min (H)    -  1    Anemia in stage 5 chronic kidney disease (H)           Follow-ups after your visit        Your next 10 appointments already scheduled     Mar 15, 2017  1:00 PM CDT   Lab with LOS LAB   [facility] ([facility])    [address] [place] [id number] [phone]            Mar 15, 2017  2:00 PM CDT   (Arrive by 1:30 PM)   Return Visit with Briec Caraballo MD   [facility] Nephrology ([facility])    [address] [place] [address] [phone]              Who to contact     If you have questions or need follow up information about today's clinic visit or your schedule please contact [facility] directly at [phone].  Normal or non-critical lab and imaging results will be communicated to you by [employer], letter or phone within 4 business days after the clinic has received the results. If you do not hear from us within 7 days, please contact the clinic through [employer] or phone. If you have a critical or abnormal lab result, we will notify you by phone as soon as possible.  Submit refill requests through [employer] or call your pharmacy and they will forward the refill request to us. Please allow 3 business days for your refill to be completed.          Additional Information About Your Visit        MyChart Information     [employer] gives you secure access to your electronic health record. If you see a primary care provider, you can also send messages to your care team and make appointments. If you have questions, please call your  primary care clinic.  If you do not have a primary care provider, please call [phone] and they will assist you.        Care EveryWhere ID     This is your Care EveryWhere ID. This could be used by other organizations to access your [facility] medical records  [id number]        Your Vitals Were     Pulse                   56            Blood Pressure from Last 3 Encounters:   03/07/17 158/78   02/28/17 142/72   02/15/17 122/66    Weight from Last 3 Encounters:   02/15/17 88.9 kg (196 lb)   01/11/17 91.9 kg (202 lb 9.6 oz)   12/01/16 97.1 kg (214 lb)              Today, you had the following     No orders found for display       Primary Care Provider Office Phone # Fax #    Yahir Turcios [phone]       [facility] [address]  [facility] [address]        Thank you!     Thank you for choosing [facility]  for your care. Our goal is always to provide you with excellent care. Hearing back from our patients is one way we can continue to improve our services. Please take a few minutes to complete the written survey that you may receive in the mail after your visit with us. Thank you!             Your Updated Medication List - Protect others around you: Learn how to safely use, store and throw away your medicines at www.disposemymeds.org.          This list is accurate as of: 3/7/17 12:21 PM.  Always use your most recent med list.                   Brand Name Dispense Instructions for use    albuterol 108 (90 BASE) MCG/ACT Inhaler    PROAIR HFA/PROVENTIL HFA/VENTOLIN HFA    1 Inhaler    Inhale 2 puffs into the lungs every 6 hours as needed for shortness of breath / dyspnea or wheezing       amLODIPine 5 MG tablet    NORVASC    180 tablet    1 TABLET BID- generic ok       aspirin 81 MG tablet      1 TABLET DAILY       atorvastatin 40 MG tablet    LIPITOR    90 tablet    Take 1 tablet (40 mg) by mouth daily       * azelastine-fluticasone 137-50 MCG/ACT nasal spray     DYMISTA     Spray 1 spray into both nostrils 2 times daily       * azelastine-fluticasone 137-50 MCG/ACT nasal spray    DYMISTA    23 g    Spray 1 spray into both nostrils 2 times daily       darbepoetin emily 60 MCG/0.3ML injection    ARANESP (ALBUMIN FREE)    0.3 mL    Inject 0.3 mLs (60 mcg) Subcutaneous every 14 days As needed for hgb<10g/dL.  If Hgb increases >1 point in 2 weeks (if blood transfusion given, use hgb PRIOR to this), SYSTOLIC BP > 180 mmHg or hgb>=10g/dL, HOLD DOSE. Dose must be within 1 week of Hgb.  Per anemia protocol with Brice Caraballo MD       ferrous sulfate 325 (65 FE) MG tablet    IRON    200 tablet    Take 1 tablet (325 mg) by mouth 3 times daily (with meals)       furosemide 80 MG tablet    LASIX    90 tablet    Take 1 tablet (80 mg) by mouth 2 times daily       glipiZIDE 5 MG tablet    GLUCOTROL    90 tablet    Take 1 tablet by mouth. TAKE 1 TABLET BY MOUTH DAILY BEFORE A MEAL       hydrALAZINE 50 MG tablet    APRESOLINE    270 tablet    Take 1 tablet (50 mg) by mouth 3 times daily       isosorbide mononitrate 60 MG 24 hr tablet    IMDUR    90 tablet    Take 1 tablet (60 mg) by mouth daily       lisinopril 40 MG tablet    PRINIVIL/ZESTRIL    180 tablet    Take 1 tablet by mouth. one twice daily       loratadine 10 MG tablet    CLARITIN     Take 10 mg by mouth as needed.       metoprolol 100 MG 24 hr tablet    TOPROL XL    90 tablet    Take 1 tablet (100 mg) by mouth daily       omeprazole 40 MG capsule    priLOSEC     Take 20 mg by mouth daily       potassium chloride SA 20 MEQ CR tablet    K-DUR/KLOR-CON M    90 tablet    Take 1 tablet (20 mEq) by mouth daily       * PREDNISONE PO      Take 10 mg by mouth daily       * predniSONE 20 MG tablet    DELTASONE    5 tablet    Take 0.5 tablets (10 mg) by mouth daily 20mg daily for one week then 10mg daily for one week then 5 mg daily for 8 days then stop.       * predniSONE 10 MG tablet    DELTASONE    25 tablet    Take 1 tablet (10 mg) by mouth  daily 20mg daily for one week then 10mg daily for one week then 5 mg daily for 8 days then stop.       UNABLE TO FIND      MEDICATION NAME: airborne prn.       VITAMIN D3 PO      Take 5,000 Units by mouth daily       * Notice:  This list has 5 medication(s) that are the same as other medications prescribed for you. Read the directions carefully, and ask your doctor or other care provider to review them with you.

## 2017-03-07 NOTE — NURSING NOTE
Patient identified by name and .  Aranesp was given per protocol and patient left in stable condition.  Joyce Vega MA

## 2017-03-10 DIAGNOSIS — N18.5 CKD (CHRONIC KIDNEY DISEASE) STAGE 5, GFR LESS THAN 15 ML/MIN (H): Primary | ICD-10-CM

## 2017-03-10 NOTE — NURSING NOTE
Labs per clinic 2A protocol.  Follow up/CKD 5  Last OV: 1/11/17  Vickie Bryson LPN  Nephrology  [facility]  [phone]

## 2017-03-15 ENCOUNTER — OFFICE VISIT (OUTPATIENT)
Dept: NEPHROLOGY | Facility: CLINIC | Age: 62
End: 2017-03-15
Attending: INTERNAL MEDICINE
Payer: COMMERCIAL

## 2017-03-15 ENCOUNTER — TELEPHONE (OUTPATIENT)
Dept: PHARMACY | Facility: CLINIC | Age: 62
End: 2017-03-15

## 2017-03-15 VITALS
HEART RATE: 69 BPM | BODY MASS INDEX: 29.33 KG/M2 | WEIGHT: 198 LBS | DIASTOLIC BLOOD PRESSURE: 70 MMHG | HEIGHT: 69 IN | TEMPERATURE: 97.7 F | SYSTOLIC BLOOD PRESSURE: 153 MMHG

## 2017-03-15 DIAGNOSIS — I10 ESSENTIAL HYPERTENSION: ICD-10-CM

## 2017-03-15 DIAGNOSIS — N18.5 CKD (CHRONIC KIDNEY DISEASE) STAGE 5, GFR LESS THAN 15 ML/MIN (H): ICD-10-CM

## 2017-03-15 DIAGNOSIS — N18.5 CKD (CHRONIC KIDNEY DISEASE) STAGE 5, GFR LESS THAN 15 ML/MIN (H): Primary | ICD-10-CM

## 2017-03-15 DIAGNOSIS — D63.1 ANEMIA IN STAGE 5 CHRONIC KIDNEY DISEASE (H): ICD-10-CM

## 2017-03-15 DIAGNOSIS — N18.5 ANEMIA IN STAGE 5 CHRONIC KIDNEY DISEASE (H): ICD-10-CM

## 2017-03-15 LAB
ALBUMIN SERPL-MCNC: 3.2 G/DL (ref 3.4–5)
ANION GAP SERPL CALCULATED.3IONS-SCNC: 12 MMOL/L (ref 3–14)
BUN SERPL-MCNC: 79 MG/DL (ref 7–30)
CALCIUM SERPL-MCNC: 8.5 MG/DL (ref 8.5–10.1)
CHLORIDE SERPL-SCNC: 112 MMOL/L (ref 94–109)
CO2 SERPL-SCNC: 20 MMOL/L (ref 20–32)
CREAT SERPL-MCNC: 4.91 MG/DL (ref 0.66–1.25)
ERYTHROCYTE [DISTWIDTH] IN BLOOD BY AUTOMATED COUNT: 16.8 % (ref 10–15)
FERRITIN SERPL-MCNC: 860 NG/ML (ref 26–388)
GFR SERPL CREATININE-BSD FRML MDRD: 12 ML/MIN/1.7M2
GLUCOSE SERPL-MCNC: 171 MG/DL (ref 70–99)
HCT VFR BLD AUTO: 32 % (ref 40–53)
HGB BLD-MCNC: 10.2 G/DL (ref 13.3–17.7)
IRON SATN MFR SERPL: 13 % (ref 15–46)
IRON SERPL-MCNC: 30 UG/DL (ref 35–180)
MCH RBC QN AUTO: 29.1 PG (ref 26.5–33)
MCHC RBC AUTO-ENTMCNC: 31.9 G/DL (ref 31.5–36.5)
MCV RBC AUTO: 91 FL (ref 78–100)
PHOSPHATE SERPL-MCNC: 3.8 MG/DL (ref 2.5–4.5)
PLATELET # BLD AUTO: 238 10E9/L (ref 150–450)
POTASSIUM SERPL-SCNC: 4 MMOL/L (ref 3.4–5.3)
RBC # BLD AUTO: 3.5 10E12/L (ref 4.4–5.9)
SODIUM SERPL-SCNC: 144 MMOL/L (ref 133–144)
TIBC SERPL-MCNC: 232 UG/DL (ref 240–430)
WBC # BLD AUTO: 5 10E9/L (ref 4–11)

## 2017-03-15 PROCEDURE — 83550 IRON BINDING TEST: CPT | Performed by: INTERNAL MEDICINE

## 2017-03-15 PROCEDURE — 85027 COMPLETE CBC AUTOMATED: CPT | Performed by: INTERNAL MEDICINE

## 2017-03-15 PROCEDURE — 83540 ASSAY OF IRON: CPT | Performed by: INTERNAL MEDICINE

## 2017-03-15 PROCEDURE — 99212 OFFICE O/P EST SF 10 MIN: CPT | Mod: ZF

## 2017-03-15 PROCEDURE — 80069 RENAL FUNCTION PANEL: CPT | Performed by: INTERNAL MEDICINE

## 2017-03-15 PROCEDURE — 36415 COLL VENOUS BLD VENIPUNCTURE: CPT | Performed by: INTERNAL MEDICINE

## 2017-03-15 PROCEDURE — 82728 ASSAY OF FERRITIN: CPT | Performed by: INTERNAL MEDICINE

## 2017-03-15 ASSESSMENT — PAIN SCALES - GENERAL: PAINLEVEL: NO PAIN (0)

## 2017-03-15 NOTE — TELEPHONE ENCOUNTER
Anemia Management Note  SUBJECTIVE/OBJECTIVE:  Referred by Dr. Brice Caraballo on 2015.  Primary Diagnosis: Anemia in Chronic Kidney Disease (N18.5 D63.1)   Secondary Diagnosis: Chronic Kidney Disease, Stage V (N18.5)  Hgb goal range: 9-10  Epo/Darbo: Aranesp 60 mcg every 4 weeks as needed for hgb<10g/dL - in clinic  RX order expires on 3/16/17  Iron regimen: Ferrous Sulfate TID  Lab orders  on 2017 In Epic    Anemia Latest Ref Rng & Units 2017 2017 2017 2/15/2017 2017 3/7/2017 3/15/2017   ANASTASIYA Dose - 60 mcg - 60 mcg - - 60 mcg -   Hemoglobin 13.3 - 17.7 g/dL 8.6(L) 9.9(L) 9.1(L) 10.5(L) - 9.6(L) 10.2(L)   IV Iron Dose - - - - - 750mg - -   TSAT 15 - 46 % 18 - - 13(L) - - -   Ferritin 26 - 388 ng/mL 663(H) - - 432(H) - - -     BP Readings from Last 3 Encounters:   17 158/78   17 142/72   02/15/17 122/66     Wt Readings from Last 2 Encounters:   02/15/17 196 lb (88.9 kg)   17 202 lb 9.6 oz (91.9 kg)     Next lab/aranesp appt is scheduled for 17    ASSESSMENT:  Hgb: Above goal - recommend hold dose - update aranesp regimen to every 4 weeks. -la/  TSat: not at goal of >30% Ferritin: At goal (>100ng/mL)    PLAN:  Hold Aranesp and RTC for Hgb, ferritin, and iron labs and an aranesp dose if needed in 2 weeks  3/17: Updated aranesp order to every 4 weeks. -mitzy    Orders needed to be renewed (for next follow-up date) in [facility]: None    Iron labs due:  3/28/17    Plan discussed with:  JANAE for Murray  Plan provided by:  Lakisha    NEXT FOLLOW-UP DATE: 17    Anemia Management Service  Ellis DumontD and Rosa Marcial CPhT  Phone: [phone]  Fax: [phone]

## 2017-03-15 NOTE — LETTER
3/15/2017       RE: Murray Nicholson  [address]  [place] [address]     Dear Colleague,    Thank you for referring your patient, Murray Nicholson, to the [facility] NEPHROLOGY at [facility]. Please see a copy of my visit note below.    Nephrology follow up    63yo aaM with diabetic/hypertensive nephropathy with albuminuria since 2004 here for follow up. Had an episode of gout recently and is now on steroids. Otherwise well. Denies CP, SOB, F/C, nausea, vomiting, dysuria. 4pt ROS negative.    PMHx  DM - since ~2000, no retinopathy/neuropathy  HTN - since ~2000  Dyslipidemia  GERD  CHF - admit 2008, diastolic dysfunction  CKD - since ~2000   - Ualb/Cr 534 in 2004   - UAs negative for blood    Medications  Hydralazine 50mg TID  Metoprolol 100mg daily  Lisinopril 40mg BID  Isosorbide mononitrate 60mg daily  Amlodipine 5mg BID  Furosemide 80mg BID  Atorvastatin 40mg daily  ASA 81mg daily  Omeprazole 40mg daily  Fe sulf 325mg TID  darbe  K Cl  Cholecalciferol 5000 daily  prednisone  No NSAIDs    Exam   144/75   150/71   153/70   P69  Gen - nad  CV - rrr, no rub, +KELLY  Resp - cta bilat  Ext - 1-2+ edema  Psych - pleasant, appropriate, talkative    Labs     2014 2015     2016  2017   12/'02 12/'04 11/'06 8/'13 10/22 4/9 5/7 5/18 5/19 6/16 2/10 10/26 1/4 3/15  Cr 1.5  2.2 2.1 2.7 4.3 4.7 6.3 5.6  4.3  3.8  4.5  5.1  4.9 (eGFR 15)  Uprot/Cr   1.3  Ualb/Cr 534    2270    7/3/13 UA - 100 alb, neg blood  5/5/15 UA - 100 alb, neg blood    Assessment/Recommendations: 63yo aaM with stage IV/V CKD with proteinuria.   CKD - stage IV/V likely due to DM and hypertension. No acute indication for dialysis at this time. Reiterated that he needs to get his dental evaluation and colonoscopy in order to get on transplant list.   - chosen PD for RRT   - transplant - needs dental evaluation and colonoscopy   - avoid NSAIDs    Volume/BP - BP slightly above target with significant edema. Target BP  <140/90.    - continue current antihypertensives for now    Anemia - Referred to anemia program. Continue Fe. Hgb improved.    Acid-base - bicarb low at 20, monitor for now.    Ca/phos - acceptable    Electrolytes - acceptable.    Current RRT choice: PD  RTC in 2 months for BP check, renal panel, CBC      Again, thank you for allowing me to participate in the care of your patient.      Sincerely,    Brice Caraballo MD

## 2017-03-15 NOTE — PROGRESS NOTES
Nephrology follow up    61yo aaM with diabetic/hypertensive nephropathy with albuminuria since 2004 here for follow up. Had an episode of gout recently and is now on steroids. Otherwise well. Denies CP, SOB, F/C, nausea, vomiting, dysuria. 4pt ROS negative.    PMHx  DM - since ~2000, no retinopathy/neuropathy  HTN - since ~2000  Dyslipidemia  GERD  CHF - admit 2008, diastolic dysfunction  CKD - since ~2000   - Ualb/Cr 534 in 2004   - UAs negative for blood    Medications  Hydralazine 50mg TID  Metoprolol 100mg daily  Lisinopril 40mg BID  Isosorbide mononitrate 60mg daily  Amlodipine 5mg BID  Furosemide 80mg BID  Atorvastatin 40mg daily  ASA 81mg daily  Omeprazole 40mg daily  Fe sulf 325mg TID  darbe  K Cl  Cholecalciferol 5000 daily  prednisone  No NSAIDs    Exam   144/75   150/71   153/70   P69  Gen - nad  CV - rrr, no rub, +KELLY  Resp - cta bilat  Ext - 1-2+ edema  Psych - pleasant, appropriate, talkative    Labs     2014 2015     2016  2017   12/'02 12/'04 11/'06 8/'13 10/22 4/9 5/7 5/18 5/19 6/16 2/10 10/26 1/4 3/15  Cr 1.5  2.2 2.1 2.7 4.3 4.7 6.3 5.6  4.3  3.8  4.5  5.1  4.9 (eGFR 15)  Uprot/Cr   1.3  Ualb/Cr 534    2270    7/3/13 UA - 100 alb, neg blood  5/5/15 UA - 100 alb, neg blood    Assessment/Recommendations: 61yo aaM with stage IV/V CKD with proteinuria.   CKD - stage IV/V likely due to DM and hypertension. No acute indication for dialysis at this time. Reiterated that he needs to get his dental evaluation and colonoscopy in order to get on transplant list.   - chosen PD for RRT   - transplant - needs dental evaluation and colonoscopy   - avoid NSAIDs    Volume/BP - BP slightly above target with significant edema. Target BP <140/90.    - continue current antihypertensives for now    Anemia - Referred to anemia program. Continue Fe. Hgb improved.    Acid-base - bicarb low at 20, monitor for now.    Ca/phos - acceptable    Electrolytes - acceptable.    Current RRT choice: PD  RTC in 2 months for BP check,  renal panel, CBC

## 2017-03-15 NOTE — MR AVS SNAPSHOT
After Visit Summary   3/15/2017    Murray Nicholson    MRN: [id number]           Patient Information     Date Of Birth          1955        Visit Information        Provider Department      3/15/2017 2:00 PM Brice Caraballo MD [facility] Nephrology        Today's Diagnoses     CKD (chronic kidney disease) stage 5, GFR less than 15 ml/min (H)    -  1    Essential hypertension           Follow-ups after your visit        Follow-up notes from your care team     Return in about 2 months (around 5/15/2017).      Your next 10 appointments already scheduled     Mar 20, 2017 12:30 PM CDT   LAB with  LAB   [facility] ([facility])    [address] [place] [id number] [phone]           Patient must bring picture ID.  Patient should be prepared to give a urine specimen  Please do not eat 10-12 hours before your appointment if you are coming in fasting for labs on lipids, cholesterol, or glucose (sugar).  Pregnant women should follow their Care Team instructions. Water with medications is okay. Do not drink coffee or other fluids.   If you have concerns about taking  your medications, please ask at office or if scheduling via [employer], send a message by clicking on Secure Messaging, Message Your Care Team.            Mar 20, 2017  1:00 PM CDT   (Arrive by 12:45 PM)   INJECTION with [facility] Nurse   [facility] Solid Organ Transplant ([facility])    [address] [place] [id number]   [phone]            Apr 04, 2017 12:30 PM CDT   LAB with  LAB   [facility] ([facility])    [address] [place] [id number] [phone]           Patient must bring picture ID.  Patient should be prepared to give a urine specimen  Please do not eat 10-12 hours before your appointment if you are coming in fasting for labs on lipids, cholesterol, or glucose (sugar).   Pregnant women should follow their Care Team instructions. Water with medications is okay. Do not drink coffee or other fluids.   If you have concerns about taking  your medications, please ask at office or if scheduling via Networker, send a message by clicking on Secure Messaging, Message Your Care Team.            Apr 04, 2017  1:00 PM CDT   (Arrive by 12:45 PM)   INJECTION with [facility] Nurse   [facility]plant ([facility])    [address] [address]  3rd Floor  [facility] [address] [phone]              Who to contact     If you have questions or need follow up information about today's clinic visit or your schedule please contact [facility] NEPHROLOGY directly at [phone].  Normal or non-critical lab and imaging results will be communicated to you by [employer], letter or phone within 4 business days after the clinic has received the results. If you do not hear from us within 7 days, please contact the clinic through PodPostert or phone. If you have a critical or abnormal lab result, we will notify you by phone as soon as possible.  Submit refill requests through Networker or call your pharmacy and they will forward the refill request to us. Please allow 3 business days for your refill to be completed.          Additional Information About Your Visit        Networker Information     Networker gives you secure access to your electronic health record. If you see a primary care provider, you can also send messages to your care team and make appointments. If you have questions, please call your primary care clinic.  If you do not have a primary care provider, please call [phone] and they will assist you.        Care EveryWhere ID     This is your Care EveryWhere ID. This could be used by other organizations to access your [place] medical records  [id number]        Your Vitals Were     Pulse Temperature Height BMI (Body Mass Index)          69 97.7  F (36.5  C) (Oral) 1.746  "m (5' 8.75\") 29.45 kg/m2         Blood Pressure from Last 3 Encounters:   03/15/17 153/70   03/07/17 158/78   02/28/17 142/72    Weight from Last 3 Encounters:   03/15/17 89.8 kg (198 lb)   02/15/17 88.9 kg (196 lb)   01/11/17 91.9 kg (202 lb 9.6 oz)              Today, you had the following     No orders found for display       Primary Care Provider Office Phone # Fax #    Yahir Turcios [phone]       [place] [address]  [facility] [address]        Thank you!     Thank you for choosing [facility] NEPHROLOGY  for your care. Our goal is always to provide you with excellent care. Hearing back from our patients is one way we can continue to improve our services. Please take a few minutes to complete the written survey that you may receive in the mail after your visit with us. Thank you!             Your Updated Medication List - Protect others around you: Learn how to safely use, store and throw away your medicines at www.disposemymeds.org.          This list is accurate as of: 3/15/17  3:05 PM.  Always use your most recent med list.                   Brand Name Dispense Instructions for use    albuterol 108 (90 BASE) MCG/ACT Inhaler    PROAIR HFA/PROVENTIL HFA/VENTOLIN HFA    1 Inhaler    Inhale 2 puffs into the lungs every 6 hours as needed for shortness of breath / dyspnea or wheezing       amLODIPine 5 MG tablet    NORVASC    180 tablet    1 TABLET BID- generic ok       aspirin 81 MG tablet      1 TABLET DAILY       atorvastatin 40 MG tablet    LIPITOR    90 tablet    Take 1 tablet (40 mg) by mouth daily       * azelastine-fluticasone 137-50 MCG/ACT nasal spray    DYMISTA     Spray 1 spray into both nostrils 2 times daily       * azelastine-fluticasone 137-50 MCG/ACT nasal spray    DYMISTA    23 g    Spray 1 spray into both nostrils 2 times daily       darbepoetin emily 60 MCG/0.3ML injection    ARANESP (ALBUMIN FREE)    0.3 mL    Inject 0.3 mLs (60 mcg) Subcutaneous every 14 days " As needed for hgb<10g/dL.  If Hgb increases >1 point in 2 weeks (if blood transfusion given, use hgb PRIOR to this), SYSTOLIC BP > 180 mmHg or hgb>=10g/dL, HOLD DOSE. Dose must be within 1 week of Hgb.  Per anemia protocol with Brice Caraballo MD       ferrous sulfate 325 (65 FE) MG tablet    IRON    200 tablet    Take 1 tablet (325 mg) by mouth 3 times daily (with meals)       furosemide 80 MG tablet    LASIX    90 tablet    Take 1 tablet (80 mg) by mouth 2 times daily       glipiZIDE 5 MG tablet    GLUCOTROL    90 tablet    Take 1 tablet by mouth. TAKE 1 TABLET BY MOUTH DAILY BEFORE A MEAL       hydrALAZINE 50 MG tablet    APRESOLINE    270 tablet    Take 1 tablet (50 mg) by mouth 3 times daily       isosorbide mononitrate 60 MG 24 hr tablet    IMDUR    90 tablet    Take 1 tablet (60 mg) by mouth daily       lisinopril 40 MG tablet    PRINIVIL/ZESTRIL    180 tablet    Take 1 tablet by mouth. one twice daily       loratadine 10 MG tablet    CLARITIN     Take 10 mg by mouth as needed.       metoprolol 100 MG 24 hr tablet    TOPROL XL    90 tablet    Take 1 tablet (100 mg) by mouth daily       omeprazole 40 MG capsule    priLOSEC     Take 20 mg by mouth daily       potassium chloride SA 20 MEQ CR tablet    K-DUR/KLOR-CON M    90 tablet    Take 1 tablet (20 mEq) by mouth daily       * PREDNISONE PO      Take 10 mg by mouth daily       * predniSONE 20 MG tablet    DELTASONE    5 tablet    Take 0.5 tablets (10 mg) by mouth daily 20mg daily for one week then 10mg daily for one week then 5 mg daily for 8 days then stop.       * predniSONE 10 MG tablet    DELTASONE    25 tablet    Take 1 tablet (10 mg) by mouth daily 20mg daily for one week then 10mg daily for one week then 5 mg daily for 8 days then stop.       UNABLE TO FIND      MEDICATION NAME: airborne prn.       VITAMIN D3 PO      Take 5,000 Units by mouth daily       * Notice:  This list has 5 medication(s) that are the same as other medications prescribed for you. Read  the directions carefully, and ask your doctor or other care provider to review them with you.

## 2017-03-15 NOTE — NURSING NOTE
"Chief Complaint   Patient presents with     RECHECK     2 + month follow up       Initial There were no vitals taken for this visit. Estimated body mass index is 28.94 kg/(m^2) as calculated from the following:    Height as of 2/15/17: 1.753 m (5' 9\").    Weight as of 2/15/17: 88.9 kg (196 lb).  Medication Reconciliation: complete  "

## 2017-03-17 PROBLEM — D63.1 ANEMIA IN STAGE 5 CHRONIC KIDNEY DISEASE (H): Status: ACTIVE | Noted: 2017-03-17

## 2017-03-17 PROBLEM — N18.5 ANEMIA IN STAGE 5 CHRONIC KIDNEY DISEASE (H): Status: ACTIVE | Noted: 2017-03-17

## 2017-03-20 ENCOUNTER — ALLIED HEALTH/NURSE VISIT (OUTPATIENT)
Dept: TRANSPLANT | Facility: CLINIC | Age: 62
End: 2017-03-20
Attending: INTERNAL MEDICINE
Payer: COMMERCIAL

## 2017-03-20 ENCOUNTER — TELEPHONE (OUTPATIENT)
Dept: PHARMACY | Facility: CLINIC | Age: 62
End: 2017-03-20

## 2017-03-20 VITALS — DIASTOLIC BLOOD PRESSURE: 73 MMHG | SYSTOLIC BLOOD PRESSURE: 146 MMHG

## 2017-03-20 DIAGNOSIS — N18.5 ANEMIA IN STAGE 5 CHRONIC KIDNEY DISEASE (H): ICD-10-CM

## 2017-03-20 DIAGNOSIS — D63.1 ANEMIA IN STAGE 5 CHRONIC KIDNEY DISEASE (H): ICD-10-CM

## 2017-03-20 DIAGNOSIS — N18.5 CKD (CHRONIC KIDNEY DISEASE) STAGE 5, GFR LESS THAN 15 ML/MIN (H): Primary | ICD-10-CM

## 2017-03-20 DIAGNOSIS — N18.5 CKD (CHRONIC KIDNEY DISEASE) STAGE 5, GFR LESS THAN 15 ML/MIN (H): ICD-10-CM

## 2017-03-20 LAB
HCT VFR BLD AUTO: 30.9 % (ref 40–53)
HGB BLD-MCNC: 9.8 G/DL (ref 13.3–17.7)

## 2017-03-20 PROCEDURE — 63400005 ZZH RX 634: Mod: ZF

## 2017-03-20 PROCEDURE — 36415 COLL VENOUS BLD VENIPUNCTURE: CPT | Performed by: INTERNAL MEDICINE

## 2017-03-20 PROCEDURE — 85014 HEMATOCRIT: CPT | Performed by: INTERNAL MEDICINE

## 2017-03-20 PROCEDURE — 96372 THER/PROPH/DIAG INJ SC/IM: CPT | Mod: ZF

## 2017-03-20 PROCEDURE — 40000269 ZZH STATISTIC NO CHARGE FACILITY FEE: Mod: 25,ZF

## 2017-03-20 PROCEDURE — 85018 HEMOGLOBIN: CPT | Performed by: INTERNAL MEDICINE

## 2017-03-20 NOTE — MR AVS SNAPSHOT
After Visit Summary   3/20/2017    Murray Nicholson    MRN: [id number]           Patient Information     Date Of Birth          1955        Visit Information        Provider Department      3/20/2017 1:00 PM Nurse, Rosi Knowles [facility] Solid Organ Transplant        Today's Diagnoses     CKD (chronic kidney disease) stage 5, GFR less than 15 ml/min (H)    -  1    Anemia in stage 5 chronic kidney disease (H)           Follow-ups after your visit        Your next 10 appointments already scheduled     Apr 04, 2017 12:30 PM CDT   Lab with  LAB   [facility] Lab ([facility])    [address] [place] [id number]   [phone]            Apr 04, 2017  1:00 PM CDT   (Arrive by 12:45 PM)   INJECTION with  Tx Nurse   [facility] Solid Organ Transplant ([facility])    [address] [place] [id number]   [phone]            Jul 12, 2017 12:00 PM CDT   Lab with  LAB   [facility] ([facility])    [address] [place] [address]   [phone]            Jul 12, 2017  1:00 PM CDT   (Arrive by 12:30 PM)   Return Visit with Brice Caraballo MD   [facility] Nephrology ([facility])    [address] [place] [phone]              Who to contact     If you have questions or need follow up information about today's clinic visit or your schedule please contact [facility] TRANS[facility] directly at [phone].  Normal or non-critical lab and imaging results will be communicated to you by MyChart, letter or phone within 4 business days after the clinic has received the results. If you do not hear from us within 7 days, please contact the clinic through MyChart or phone. If you have a critical or abnormal lab result, we will notify you by phone as soon as possible.  Submit refill requests through  [employer] or call your pharmacy and they will forward the refill request to us. Please allow 3 business days for your refill to be completed.          Additional Information About Your Visit        GIGAShart Information     [employer] gives you secure access to your electronic health record. If you see a primary care provider, you can also send messages to your care team and make appointments. If you have questions, please call your primary care clinic.  If you do not have a primary care provider, please call [phone] and they will assist you.        Care EveryWhere ID     This is your Care EveryWhere ID. This could be used by other organizations to access your [place] medical records  [id number]         Blood Pressure from Last 3 Encounters:   03/20/17 146/73   03/15/17 153/70   03/07/17 158/78    Weight from Last 3 Encounters:   03/15/17 89.8 kg (198 lb)   02/15/17 88.9 kg (196 lb)   01/11/17 91.9 kg (202 lb 9.6 oz)              Today, you had the following     No orders found for display       Primary Care Provider Office Phone # Fax #    Yahir Turcios [phone]       [address]  [facility] [address]        Thank you!     Thank you for choosing [facility]  for your care. Our goal is always to provide you with excellent care. Hearing back from our patients is one way we can continue to improve our services. Please take a few minutes to complete the written survey that you may receive in the mail after your visit with us. Thank you!             Your Updated Medication List - Protect others around you: Learn how to safely use, store and throw away your medicines at www.disposemymeds.org.          This list is accurate as of: 3/20/17  6:06 PM.  Always use your most recent med list.                   Brand Name Dispense Instructions for use    albuterol 108 (90 BASE) MCG/ACT Inhaler    PROAIR HFA/PROVENTIL HFA/VENTOLIN HFA    1 Inhaler    Inhale 2 puffs  into the lungs every 6 hours as needed for shortness of breath / dyspnea or wheezing       amLODIPine 5 MG tablet    NORVASC    180 tablet    1 TABLET BID- generic ok       aspirin 81 MG tablet      1 TABLET DAILY       atorvastatin 40 MG tablet    LIPITOR    90 tablet    Take 1 tablet (40 mg) by mouth daily       * azelastine-fluticasone 137-50 MCG/ACT nasal spray    DYMISTA     Spray 1 spray into both nostrils 2 times daily       * azelastine-fluticasone 137-50 MCG/ACT nasal spray    DYMISTA    23 g    Spray 1 spray into both nostrils 2 times daily       darbepoetin emily 60 MCG/0.3ML injection    ARANESP (ALBUMIN FREE)    0.3 mL    Inject 0.3 mLs (60 mcg) Subcutaneous every 14 days As needed for hgb<10g/dL.  If Hgb increases >1 point in 2 weeks (if blood transfusion given, use hgb PRIOR to this), SYSTOLIC BP > 180 mmHg or hgb>=10g/dL, HOLD DOSE. Dose must be within 1 week of Hgb.  Per anemia protocol with Brice Caraballo MD       ferrous sulfate 325 (65 FE) MG tablet    IRON    200 tablet    Take 1 tablet (325 mg) by mouth 3 times daily (with meals)       furosemide 80 MG tablet    LASIX    90 tablet    Take 1 tablet (80 mg) by mouth 2 times daily       glipiZIDE 5 MG tablet    GLUCOTROL    90 tablet    Take 1 tablet by mouth. TAKE 1 TABLET BY MOUTH DAILY BEFORE A MEAL       hydrALAZINE 50 MG tablet    APRESOLINE    270 tablet    Take 1 tablet (50 mg) by mouth 3 times daily       isosorbide mononitrate 60 MG 24 hr tablet    IMDUR    90 tablet    Take 1 tablet (60 mg) by mouth daily       lisinopril 40 MG tablet    PRINIVIL/ZESTRIL    180 tablet    Take 1 tablet by mouth. one twice daily       loratadine 10 MG tablet    CLARITIN     Take 10 mg by mouth as needed.       metoprolol 100 MG 24 hr tablet    TOPROL XL    90 tablet    Take 1 tablet (100 mg) by mouth daily       omeprazole 40 MG capsule    priLOSEC     Take 20 mg by mouth daily       potassium chloride SA 20 MEQ CR tablet    K-DUR/KLOR-CON M    90 tablet     Take 1 tablet (20 mEq) by mouth daily       * PREDNISONE PO      Take 10 mg by mouth daily       * predniSONE 20 MG tablet    DELTASONE    5 tablet    Take 0.5 tablets (10 mg) by mouth daily 20mg daily for one week then 10mg daily for one week then 5 mg daily for 8 days then stop.       * predniSONE 10 MG tablet    DELTASONE    25 tablet    Take 1 tablet (10 mg) by mouth daily 20mg daily for one week then 10mg daily for one week then 5 mg daily for 8 days then stop.       UNABLE TO FIND      MEDICATION NAME: airborne prn.       VITAMIN D3 PO      Take 5,000 Units by mouth daily       * Notice:  This list has 5 medication(s) that are the same as other medications prescribed for you. Read the directions carefully, and ask your doctor or other care provider to review them with you.

## 2017-03-20 NOTE — NURSING NOTE
Patient who was identified by name and  was given Aranesp 60 mcg ( see med note). Patient tolerated injection well and was discharged without complication.   Trang Moore  CMA

## 2017-03-29 ENCOUNTER — CARE COORDINATION (OUTPATIENT)
Dept: NEPHROLOGY | Facility: CLINIC | Age: 62
End: 2017-03-29

## 2017-04-04 ENCOUNTER — ALLIED HEALTH/NURSE VISIT (OUTPATIENT)
Dept: TRANSPLANT | Facility: CLINIC | Age: 62
End: 2017-04-04
Attending: INTERNAL MEDICINE
Payer: COMMERCIAL

## 2017-04-04 ENCOUNTER — TELEPHONE (OUTPATIENT)
Dept: PHARMACY | Facility: CLINIC | Age: 62
End: 2017-04-04

## 2017-04-04 DIAGNOSIS — D63.1 ANEMIA IN CHRONIC RENAL DISEASE: Primary | ICD-10-CM

## 2017-04-04 DIAGNOSIS — D63.1 ANEMIA IN STAGE 5 CHRONIC KIDNEY DISEASE (H): Primary | ICD-10-CM

## 2017-04-04 DIAGNOSIS — N18.5 CKD (CHRONIC KIDNEY DISEASE) STAGE 5, GFR LESS THAN 15 ML/MIN (H): ICD-10-CM

## 2017-04-04 DIAGNOSIS — N18.5 ANEMIA IN STAGE 5 CHRONIC KIDNEY DISEASE (H): ICD-10-CM

## 2017-04-04 DIAGNOSIS — D63.1 ANEMIA IN STAGE 5 CHRONIC KIDNEY DISEASE (H): ICD-10-CM

## 2017-04-04 DIAGNOSIS — N18.5 ANEMIA IN STAGE 5 CHRONIC KIDNEY DISEASE (H): Primary | ICD-10-CM

## 2017-04-04 DIAGNOSIS — N18.9 ANEMIA IN CHRONIC RENAL DISEASE: Primary | ICD-10-CM

## 2017-04-04 LAB
HCT VFR BLD AUTO: 27 % (ref 40–53)
HGB BLD-MCNC: 8.3 G/DL (ref 13.3–17.7)

## 2017-04-04 PROCEDURE — 36415 COLL VENOUS BLD VENIPUNCTURE: CPT | Performed by: INTERNAL MEDICINE

## 2017-04-04 PROCEDURE — 40000269 ZZH STATISTIC NO CHARGE FACILITY FEE: Mod: ZF

## 2017-04-04 PROCEDURE — 85014 HEMATOCRIT: CPT | Performed by: INTERNAL MEDICINE

## 2017-04-04 PROCEDURE — 85018 HEMOGLOBIN: CPT | Performed by: INTERNAL MEDICINE

## 2017-04-04 PROCEDURE — 63400005 ZZH RX 634: Mod: ZF | Performed by: INTERNAL MEDICINE

## 2017-04-04 RX ADMIN — DARBEPOETIN ALFA 60 MCG: 60 INJECTION, SOLUTION INTRAVENOUS; SUBCUTANEOUS at 13:57

## 2017-04-04 NOTE — NURSING NOTE
Frequency: Every 14 days  Most recent or today's HGB: 8.3   Date: 2017  Date of lat dose: 3/20/2017  HGB associated with last dose given: 9.8  Exp.    Blood Pressure:118/71    Diagnosis: Anemia in CKD    Ordered by: Brice Major MD  VIS Offered: Yes    Double Checked by: Grecia YAO    See MAR for administration details    Pt's first name, last name and  verified prior to medication administration, injection given without complications or questions.

## 2017-04-04 NOTE — TELEPHONE ENCOUNTER
Anemia Management Note  SUBJECTIVE/OBJECTIVE:  Referred by Dr. Brice Caraballo on 2015.  Primary Diagnosis: Anemia in Chronic Kidney Disease (N18.5 D63.1)   Secondary Diagnosis: Chronic Kidney Disease, Stage V (N18.5)  Hgb goal range: 9-10  Epo/Darbo: Aranesp 100 mcg every 2 weeks as needed - in clinic  RX order expires on 18  Iron regimen: Ferrous Sulfate TID  Lab orders  on 2017 In Epic    Anemia Latest Ref Rng & Units 2017 2/15/2017 2017 3/7/2017 3/15/2017 3/20/2017 2017   ANASTASIYA Dose - 60 mcg - - 60 mcg - 60 mcg 60 mcg   Hemoglobin 13.3 - 17.7 g/dL 9.1(L) 10.5(L) - 9.6(L) 10.2(L) 9.8(L) 8.3(L)   IV Iron Dose - - - 750mg - - - -   TSAT 15 - 46 % - 13(L) - - 13(L) - -   Ferritin 26 - 388 ng/mL - 432(H) - - 860(H) - -     BP Readings from Last 3 Encounters:   17 146/73   03/15/17 153/70   17 158/78     Wt Readings from Last 2 Encounters:   03/15/17 198 lb (89.8 kg)   02/15/17 196 lb (88.9 kg)     ASSESSMENT:  Hgb:   Not at goal - received dose in clinic - recommend increase aranesp dose. -mitzy  TSat: not at goal (>30%) but ferritin >500ng/mL.  IV iron not indicated at this time per anemia protocol.     PLAN:  RTC for hgb, ferritin, and iron labs then aranesp if needed in 2 week(s)  : Increased aranesp dose to 100mcg every 2 weeks.  -mitzy    Orders needed to be renewed (for next follow-up date) in [facility]: None    Iron labs due:  17    Plan discussed with:  JANAE for [place]  Plan provided by:  Lakisha    NEXT FOLLOW-UP DATE:  17    Anemia Management Service  Zelda Rich,EllisD and Rosa Marcial CPhT  Phone: [phone]  Fax: [phone]

## 2017-04-04 NOTE — MR AVS SNAPSHOT
After Visit Summary   4/4/2017    Murray Nicholson    MRN: [id number]           Patient Information     Date Of Birth          1955        Visit Information        Provider Department      4/4/2017 1:00 PM Nurse, [facility]        Today's Diagnoses     Anemia in chronic renal disease    -  1    CKD (chronic kidney disease) stage 5, GFR less than 15 ml/min (H)        Anemia in stage 5 chronic kidney disease (H)           Follow-ups after your visit        Your next 10 appointments already scheduled     Jul 12, 2017 12:00 PM CDT   Lab with LOS LAB   [facility] ([facility])    [address] [place] [id number] [phone]            Jul 12, 2017  1:00 PM CDT   (Arrive by 12:30 PM)   Return Visit with Brice Caraballo MD   [facility] ([facility])    [address] [place] [address] [id number] [phone]              Who to contact     If you have questions or need follow up information about today's clinic visit or your schedule please contact [facility] directly at [phone].  Normal or non-critical lab and imaging results will be communicated to you by Yumberhart, letter or phone within 4 business days after the clinic has received the results. If you do not hear from us within 7 days, please contact the clinic through Yumberhart or phone. If you have a critical or abnormal lab result, we will notify you by phone as soon as possible.  Submit refill requests through Weft or call your pharmacy and they will forward the refill request to us. Please allow 3 business days for your refill to be completed.          Additional Information About Your Visit        Yumberhart Information     Weft gives you secure access to your electronic health record. If you see a primary care provider, you can also send messages to your care team and make appointments. If  you have questions, please call your primary care clinic.  If you do not have a primary care provider, please call [phone] and they will assist you.        Care EveryWhere ID     This is your Care EveryWhere ID. This could be used by other organizations to access your [place] medical records  [id number]         Blood Pressure from Last 3 Encounters:   03/20/17 146/73   03/15/17 153/70   03/07/17 158/78    Weight from Last 3 Encounters:   03/15/17 89.8 kg (198 lb)   02/15/17 88.9 kg (196 lb)   01/11/17 91.9 kg (202 lb 9.6 oz)              We Performed the Following     Hematocrit     Hemoglobin     Treatment Conditions     Vital signs        Primary Care Provider Office Phone # Fax #    Yahir Turcios [phone]       [place] [id number]  [facility] [address]        Thank you!     Thank you for choosing [facility]  for your care. Our goal is always to provide you with excellent care. Hearing back from our patients is one way we can continue to improve our services. Please take a few minutes to complete the written survey that you may receive in the mail after your visit with us. Thank you!             Your Updated Medication List - Protect others around you: Learn how to safely use, store and throw away your medicines at www.disposemymeds.org.          This list is accurate as of: 4/4/17  2:04 PM.  Always use your most recent med list.                   Brand Name Dispense Instructions for use    albuterol 108 (90 BASE) MCG/ACT Inhaler    PROAIR HFA/PROVENTIL HFA/VENTOLIN HFA    1 Inhaler    Inhale 2 puffs into the lungs every 6 hours as needed for shortness of breath / dyspnea or wheezing       amLODIPine 5 MG tablet    NORVASC    180 tablet    1 TABLET BID- generic ok       aspirin 81 MG tablet      1 TABLET DAILY       atorvastatin 40 MG tablet    LIPITOR    90 tablet    Take 1 tablet (40 mg) by mouth daily       * azelastine-fluticasone 137-50 MCG/ACT  nasal spray    DYMISTA     Spray 1 spray into both nostrils 2 times daily       * azelastine-fluticasone 137-50 MCG/ACT nasal spray    DYMISTA    23 g    Spray 1 spray into both nostrils 2 times daily       darbepoetin emily 60 MCG/0.3ML injection    ARANESP (ALBUMIN FREE)    0.3 mL    Inject 0.3 mLs (60 mcg) Subcutaneous every 14 days As needed for hgb<10g/dL.  If Hgb increases >1 point in 2 weeks (if blood transfusion given, use hgb PRIOR to this), SYSTOLIC BP > 180 mmHg or hgb>=10g/dL, HOLD DOSE. Dose must be within 1 week of Hgb.  Per anemia protocol with Brice Caraballo MD       ferrous sulfate 325 (65 FE) MG tablet    IRON    200 tablet    Take 1 tablet (325 mg) by mouth 3 times daily (with meals)       furosemide 80 MG tablet    LASIX    90 tablet    Take 1 tablet (80 mg) by mouth 2 times daily       glipiZIDE 5 MG tablet    GLUCOTROL    90 tablet    Take 1 tablet by mouth. TAKE 1 TABLET BY MOUTH DAILY BEFORE A MEAL       hydrALAZINE 50 MG tablet    APRESOLINE    270 tablet    Take 1 tablet (50 mg) by mouth 3 times daily       isosorbide mononitrate 60 MG 24 hr tablet    IMDUR    90 tablet    Take 1 tablet (60 mg) by mouth daily       lisinopril 40 MG tablet    PRINIVIL/ZESTRIL    180 tablet    Take 1 tablet by mouth. one twice daily       loratadine 10 MG tablet    CLARITIN     Take 10 mg by mouth as needed.       metoprolol 100 MG 24 hr tablet    TOPROL XL    90 tablet    Take 1 tablet (100 mg) by mouth daily       omeprazole 40 MG capsule    priLOSEC     Take 20 mg by mouth daily       potassium chloride SA 20 MEQ CR tablet    K-DUR/KLOR-CON M    90 tablet    Take 1 tablet (20 mEq) by mouth daily       * PREDNISONE PO      Take 10 mg by mouth daily       * predniSONE 20 MG tablet    DELTASONE    5 tablet    Take 0.5 tablets (10 mg) by mouth daily 20mg daily for one week then 10mg daily for one week then 5 mg daily for 8 days then stop.       * predniSONE 10 MG tablet    DELTASONE    25 tablet    Take 1 tablet  (10 mg) by mouth daily 20mg daily for one week then 10mg daily for one week then 5 mg daily for 8 days then stop.       UNABLE TO FIND      MEDICATION NAME: airborne prn.       VITAMIN D3 PO      Take 5,000 Units by mouth daily       * Notice:  This list has 5 medication(s) that are the same as other medications prescribed for you. Read the directions carefully, and ask your doctor or other care provider to review them with you.

## 2017-04-08 ENCOUNTER — TELEPHONE (OUTPATIENT)
Dept: NURSING | Facility: CLINIC | Age: 62
End: 2017-04-08

## 2017-04-08 ENCOUNTER — APPOINTMENT (OUTPATIENT)
Dept: GENERAL RADIOLOGY | Facility: CLINIC | Age: 62
DRG: 291 | End: 2017-04-08
Attending: FAMILY MEDICINE
Payer: COMMERCIAL

## 2017-04-08 ENCOUNTER — HOSPITAL ENCOUNTER (INPATIENT)
Facility: CLINIC | Age: 62
LOS: 5 days | Discharge: HOME | DRG: 291 | End: 2017-04-13
Attending: FAMILY MEDICINE | Admitting: HOSPITALIST
Payer: COMMERCIAL

## 2017-04-08 DIAGNOSIS — E87.79 OTHER HYPERVOLEMIA: ICD-10-CM

## 2017-04-08 DIAGNOSIS — E11.9 TYPE 2 DIABETES MELLITUS WITHOUT COMPLICATION (H): ICD-10-CM

## 2017-04-08 DIAGNOSIS — E11.22 TYPE 2 DIABETES MELLITUS WITH CHRONIC KIDNEY DISEASE, WITHOUT LONG-TERM CURRENT USE OF INSULIN, UNSPECIFIED CKD STAGE (H): ICD-10-CM

## 2017-04-08 DIAGNOSIS — Z79.84 LONG TERM CURRENT USE OF ORAL HYPOGLYCEMIC DRUG: ICD-10-CM

## 2017-04-08 DIAGNOSIS — N18.9 CHRONIC KIDNEY DISEASE, UNSPECIFIED: ICD-10-CM

## 2017-04-08 DIAGNOSIS — D50.9 IRON DEFICIENCY ANEMIA, UNSPECIFIED IRON DEFICIENCY ANEMIA TYPE: ICD-10-CM

## 2017-04-08 DIAGNOSIS — I50.9 CONGESTIVE HEART FAILURE, UNSPECIFIED CONGESTIVE HEART FAILURE CHRONICITY, UNSPECIFIED CONGESTIVE HEART FAILURE TYPE: ICD-10-CM

## 2017-04-08 DIAGNOSIS — E11.69 TYPE 2 DIABETES MELLITUS WITH OTHER SPECIFIED COMPLICATION (H): ICD-10-CM

## 2017-04-08 DIAGNOSIS — K59.00 CONSTIPATION, UNSPECIFIED CONSTIPATION TYPE: ICD-10-CM

## 2017-04-08 DIAGNOSIS — I12.9 RENAL HYPERTENSION, STAGE 1-4 OR UNSPECIFIED CHRONIC KIDNEY DISEASE: ICD-10-CM

## 2017-04-08 DIAGNOSIS — I15.0 RENOVASCULAR HYPERTENSION WITH GOAL BLOOD PRESSURE LESS THAN 130/80: ICD-10-CM

## 2017-04-08 DIAGNOSIS — M62.81 GENERALIZED MUSCLE WEAKNESS: ICD-10-CM

## 2017-04-08 DIAGNOSIS — M10.9 GOUT, UNSPECIFIED CAUSE, UNSPECIFIED CHRONICITY, UNSPECIFIED SITE: Primary | ICD-10-CM

## 2017-04-08 PROBLEM — E87.70 FLUID OVERLOAD: Status: ACTIVE | Noted: 2017-04-08

## 2017-04-08 LAB
ALBUMIN SERPL-MCNC: 2.3 G/DL (ref 3.4–5)
ALBUMIN UR-MCNC: 30 MG/DL
ALP SERPL-CCNC: 92 U/L (ref 40–150)
ALT SERPL W P-5'-P-CCNC: 18 U/L (ref 0–70)
ANION GAP SERPL CALCULATED.3IONS-SCNC: 13 MMOL/L (ref 3–14)
APPEARANCE UR: CLEAR
AST SERPL W P-5'-P-CCNC: 13 U/L (ref 0–45)
BACTERIA #/AREA URNS HPF: ABNORMAL /HPF
BASOPHILS # BLD AUTO: 0.1 10E9/L (ref 0–0.2)
BASOPHILS NFR BLD AUTO: 0.6 %
BILIRUB SERPL-MCNC: 0.4 MG/DL (ref 0.2–1.3)
BILIRUB UR QL STRIP: NEGATIVE
BUN SERPL-MCNC: 115 MG/DL (ref 7–30)
CALCIUM SERPL-MCNC: 8.9 MG/DL (ref 8.5–10.1)
CHLORIDE SERPL-SCNC: 116 MMOL/L (ref 94–109)
CO2 SERPL-SCNC: 13 MMOL/L (ref 20–32)
COLOR UR AUTO: YELLOW
CREAT SERPL-MCNC: 6.98 MG/DL (ref 0.66–1.25)
DIFFERENTIAL METHOD BLD: ABNORMAL
EOSINOPHIL # BLD AUTO: 0.6 10E9/L (ref 0–0.7)
EOSINOPHIL NFR BLD AUTO: 7.5 %
ERYTHROCYTE [DISTWIDTH] IN BLOOD BY AUTOMATED COUNT: 15.9 % (ref 10–15)
GFR SERPL CREATININE-BSD FRML MDRD: 8 ML/MIN/1.7M2
GLUCOSE BLDC GLUCOMTR-MCNC: 215 MG/DL (ref 70–99)
GLUCOSE SERPL-MCNC: 165 MG/DL (ref 70–99)
GLUCOSE UR STRIP-MCNC: NEGATIVE MG/DL
HCT VFR BLD AUTO: 26.9 % (ref 40–53)
HGB BLD-MCNC: 8.3 G/DL (ref 13.3–17.7)
HGB UR QL STRIP: NEGATIVE
IMM GRANULOCYTES # BLD: 0.1 10E9/L (ref 0–0.4)
IMM GRANULOCYTES NFR BLD: 0.6 %
KETONES UR STRIP-MCNC: NEGATIVE MG/DL
LEUKOCYTE ESTERASE UR QL STRIP: NEGATIVE
LYMPHOCYTES # BLD AUTO: 0.7 10E9/L (ref 0.8–5.3)
LYMPHOCYTES NFR BLD AUTO: 8.9 %
MAGNESIUM SERPL-MCNC: 2.2 MG/DL (ref 1.6–2.3)
MCH RBC QN AUTO: 27.5 PG (ref 26.5–33)
MCHC RBC AUTO-ENTMCNC: 30.9 G/DL (ref 31.5–36.5)
MCV RBC AUTO: 89 FL (ref 78–100)
MONOCYTES # BLD AUTO: 0.3 10E9/L (ref 0–1.3)
MONOCYTES NFR BLD AUTO: 3.9 %
NEUTROPHILS # BLD AUTO: 6.4 10E9/L (ref 1.6–8.3)
NEUTROPHILS NFR BLD AUTO: 78.5 %
NITRATE UR QL: NEGATIVE
NRBC # BLD AUTO: 0 10*3/UL
NRBC BLD AUTO-RTO: 0 /100
NT-PROBNP SERPL-MCNC: ABNORMAL PG/ML (ref 0–900)
PH UR STRIP: 5 PH (ref 5–7)
PHOSPHATE SERPL-MCNC: 4.9 MG/DL (ref 2.5–4.5)
PLATELET # BLD AUTO: 494 10E9/L (ref 150–450)
POTASSIUM SERPL-SCNC: 4.8 MMOL/L (ref 3.4–5.3)
PROT SERPL-MCNC: 6.9 G/DL (ref 6.8–8.8)
RBC # BLD AUTO: 3.02 10E12/L (ref 4.4–5.9)
RBC #/AREA URNS AUTO: <1 /HPF (ref 0–2)
SODIUM SERPL-SCNC: 142 MMOL/L (ref 133–144)
SP GR UR STRIP: 1.01 (ref 1–1.03)
URN SPEC COLLECT METH UR: ABNORMAL
UROBILINOGEN UR STRIP-MCNC: NORMAL MG/DL (ref 0–2)
WBC # BLD AUTO: 8.2 10E9/L (ref 4–11)
WBC #/AREA URNS AUTO: 7 /HPF (ref 0–2)

## 2017-04-08 PROCEDURE — 25000132 ZZH RX MED GY IP 250 OP 250 PS 637: Performed by: FAMILY MEDICINE

## 2017-04-08 PROCEDURE — 99285 EMERGENCY DEPT VISIT HI MDM: CPT | Mod: 25 | Performed by: FAMILY MEDICINE

## 2017-04-08 PROCEDURE — 85025 COMPLETE CBC W/AUTO DIFF WBC: CPT | Performed by: FAMILY MEDICINE

## 2017-04-08 PROCEDURE — 80053 COMPREHEN METABOLIC PANEL: CPT | Performed by: FAMILY MEDICINE

## 2017-04-08 PROCEDURE — 71020 XR CHEST 2 VW: CPT

## 2017-04-08 PROCEDURE — 81001 URINALYSIS AUTO W/SCOPE: CPT | Performed by: FAMILY MEDICINE

## 2017-04-08 PROCEDURE — 83880 ASSAY OF NATRIURETIC PEPTIDE: CPT | Performed by: FAMILY MEDICINE

## 2017-04-08 PROCEDURE — 84100 ASSAY OF PHOSPHORUS: CPT | Performed by: FAMILY MEDICINE

## 2017-04-08 PROCEDURE — 00000146 ZZHCL STATISTIC GLUCOSE BY METER IP

## 2017-04-08 PROCEDURE — 25000128 H RX IP 250 OP 636: Performed by: FAMILY MEDICINE

## 2017-04-08 PROCEDURE — 36415 COLL VENOUS BLD VENIPUNCTURE: CPT | Performed by: FAMILY MEDICINE

## 2017-04-08 PROCEDURE — 96374 THER/PROPH/DIAG INJ IV PUSH: CPT | Performed by: FAMILY MEDICINE

## 2017-04-08 PROCEDURE — 21400006 ZZH R&B CCU INTERMEDIATE UMMC

## 2017-04-08 PROCEDURE — 83735 ASSAY OF MAGNESIUM: CPT | Performed by: FAMILY MEDICINE

## 2017-04-08 PROCEDURE — 93005 ELECTROCARDIOGRAM TRACING: CPT | Performed by: FAMILY MEDICINE

## 2017-04-08 PROCEDURE — 99284 EMERGENCY DEPT VISIT MOD MDM: CPT | Mod: Z6 | Performed by: FAMILY MEDICINE

## 2017-04-08 PROCEDURE — 25000131 ZZH RX MED GY IP 250 OP 636 PS 637: Performed by: FAMILY MEDICINE

## 2017-04-08 RX ORDER — AMLODIPINE BESYLATE 5 MG/1
5 TABLET ORAL DAILY
Status: DISCONTINUED | OUTPATIENT
Start: 2017-04-09 | End: 2017-04-10

## 2017-04-08 RX ORDER — LORATADINE 10 MG/1
10 TABLET ORAL DAILY PRN
Status: DISCONTINUED | OUTPATIENT
Start: 2017-04-08 | End: 2017-04-13 | Stop reason: HOSPADM

## 2017-04-08 RX ORDER — LISINOPRIL 40 MG/1
40 TABLET ORAL DAILY
Status: DISCONTINUED | OUTPATIENT
Start: 2017-04-09 | End: 2017-04-13

## 2017-04-08 RX ORDER — FUROSEMIDE 10 MG/ML
80 INJECTION INTRAMUSCULAR; INTRAVENOUS ONCE
Status: COMPLETED | OUTPATIENT
Start: 2017-04-08 | End: 2017-04-08

## 2017-04-08 RX ORDER — FUROSEMIDE 80 MG
80 TABLET ORAL 2 TIMES DAILY
Status: DISCONTINUED | OUTPATIENT
Start: 2017-04-08 | End: 2017-04-08

## 2017-04-08 RX ORDER — NICOTINE POLACRILEX 4 MG
15-30 LOZENGE BUCCAL
Status: DISCONTINUED | OUTPATIENT
Start: 2017-04-08 | End: 2017-04-13 | Stop reason: HOSPADM

## 2017-04-08 RX ORDER — ONDANSETRON 2 MG/ML
4 INJECTION INTRAMUSCULAR; INTRAVENOUS EVERY 6 HOURS PRN
Status: DISCONTINUED | OUTPATIENT
Start: 2017-04-08 | End: 2017-04-13 | Stop reason: HOSPADM

## 2017-04-08 RX ORDER — HYDRALAZINE HYDROCHLORIDE 50 MG/1
50 TABLET, FILM COATED ORAL 3 TIMES DAILY
Status: DISCONTINUED | OUTPATIENT
Start: 2017-04-08 | End: 2017-04-13 | Stop reason: HOSPADM

## 2017-04-08 RX ORDER — HEPARIN SODIUM 5000 [USP'U]/.5ML
5000 INJECTION, SOLUTION INTRAVENOUS; SUBCUTANEOUS EVERY 8 HOURS
Status: DISCONTINUED | OUTPATIENT
Start: 2017-04-08 | End: 2017-04-13 | Stop reason: HOSPADM

## 2017-04-08 RX ORDER — ISOSORBIDE MONONITRATE 60 MG/1
60 TABLET, EXTENDED RELEASE ORAL DAILY
Status: DISCONTINUED | OUTPATIENT
Start: 2017-04-09 | End: 2017-04-13 | Stop reason: HOSPADM

## 2017-04-08 RX ORDER — METOPROLOL SUCCINATE 100 MG/1
100 TABLET, EXTENDED RELEASE ORAL DAILY
Status: DISCONTINUED | OUTPATIENT
Start: 2017-04-09 | End: 2017-04-13 | Stop reason: HOSPADM

## 2017-04-08 RX ORDER — DEXTROSE MONOHYDRATE 25 G/50ML
25-50 INJECTION, SOLUTION INTRAVENOUS
Status: DISCONTINUED | OUTPATIENT
Start: 2017-04-08 | End: 2017-04-13 | Stop reason: HOSPADM

## 2017-04-08 RX ORDER — ATORVASTATIN CALCIUM 40 MG/1
40 TABLET, FILM COATED ORAL EVERY EVENING
Status: DISCONTINUED | OUTPATIENT
Start: 2017-04-08 | End: 2017-04-13 | Stop reason: HOSPADM

## 2017-04-08 RX ORDER — FUROSEMIDE 10 MG/ML
40 INJECTION INTRAMUSCULAR; INTRAVENOUS ONCE
Status: COMPLETED | OUTPATIENT
Start: 2017-04-08 | End: 2017-04-08

## 2017-04-08 RX ORDER — ASPIRIN 81 MG/1
81 TABLET ORAL DAILY
Status: DISCONTINUED | OUTPATIENT
Start: 2017-04-08 | End: 2017-04-10

## 2017-04-08 RX ORDER — ONDANSETRON 4 MG/1
4 TABLET, ORALLY DISINTEGRATING ORAL EVERY 6 HOURS PRN
Status: DISCONTINUED | OUTPATIENT
Start: 2017-04-08 | End: 2017-04-13 | Stop reason: HOSPADM

## 2017-04-08 RX ORDER — DOCUSATE SODIUM 100 MG/1
100 CAPSULE, LIQUID FILLED ORAL 2 TIMES DAILY
Status: DISCONTINUED | OUTPATIENT
Start: 2017-04-08 | End: 2017-04-13 | Stop reason: HOSPADM

## 2017-04-08 RX ORDER — ACETAMINOPHEN 325 MG/1
650 TABLET ORAL EVERY 4 HOURS PRN
Status: DISCONTINUED | OUTPATIENT
Start: 2017-04-08 | End: 2017-04-13 | Stop reason: HOSPADM

## 2017-04-08 RX ORDER — POTASSIUM CHLORIDE 750 MG/1
20 TABLET, EXTENDED RELEASE ORAL DAILY
Status: DISCONTINUED | OUTPATIENT
Start: 2017-04-08 | End: 2017-04-08

## 2017-04-08 RX ADMIN — HYDRALAZINE HYDROCHLORIDE 50 MG: 50 TABLET ORAL at 21:51

## 2017-04-08 RX ADMIN — INSULIN ASPART 1 UNITS: 100 INJECTION, SOLUTION INTRAVENOUS; SUBCUTANEOUS at 23:04

## 2017-04-08 RX ADMIN — FUROSEMIDE 40 MG: 10 INJECTION, SOLUTION INTRAVENOUS at 17:23

## 2017-04-08 RX ADMIN — ASPIRIN 81 MG: 81 TABLET, COATED ORAL at 21:51

## 2017-04-08 RX ADMIN — FUROSEMIDE 80 MG: 10 INJECTION, SOLUTION INTRAVENOUS at 22:28

## 2017-04-08 RX ADMIN — HEPARIN SODIUM 5000 UNITS: 5000 INJECTION, SOLUTION INTRAVENOUS; SUBCUTANEOUS at 22:28

## 2017-04-08 RX ADMIN — ATORVASTATIN CALCIUM 40 MG: 40 TABLET, FILM COATED ORAL at 21:51

## 2017-04-08 ASSESSMENT — ACTIVITIES OF DAILY LIVING (ADL)
SWALLOWING: 0-->SWALLOWS FOODS/LIQUIDS WITHOUT DIFFICULTY
COGNITION: 0 - NO COGNITION ISSUES REPORTED
RETIRED_COMMUNICATION: 0-->UNDERSTANDS/COMMUNICATES WITHOUT DIFFICULTY
AMBULATION: 0-->INDEPENDENT
DRESS: 0-->INDEPENDENT
BATHING: 0-->INDEPENDENT
FALL_HISTORY_WITHIN_LAST_SIX_MONTHS: NO
RETIRED_EATING: 0-->INDEPENDENT
TOILETING: 0-->INDEPENDENT
TRANSFERRING: 0-->INDEPENDENT

## 2017-04-08 ASSESSMENT — ENCOUNTER SYMPTOMS
ABDOMINAL PAIN: 0
NECK PAIN: 0
WHEEZING: 0
VOMITING: 0
LIGHT-HEADEDNESS: 0
FEVER: 0
FATIGUE: 1
MYALGIAS: 1
NECK STIFFNESS: 0
NAUSEA: 0
ABDOMINAL DISTENTION: 0
CHEST TIGHTNESS: 0
CHILLS: 0
CONSTIPATION: 0
HEADACHES: 0
HEMATOLOGIC/LYMPHATIC NEGATIVE: 1
UNEXPECTED WEIGHT CHANGE: 1
SORE THROAT: 0
BACK PAIN: 1
RHINORRHEA: 1
SHORTNESS OF BREATH: 0
ARTHRALGIAS: 0
DYSURIA: 0
PALPITATIONS: 0
DIARRHEA: 0
HEMATURIA: 0
FLANK PAIN: 0
NEUROLOGICAL NEGATIVE: 1
COUGH: 1

## 2017-04-08 NOTE — PHARMACY-ADMISSION MEDICATION HISTORY
Admission Medication History status for the 4/8/2017 admission is complete.  See EPIC admission navigator for Prior to Admission medications.    Medication history sources:  patient, pill bottles, chart review    Medication history source reliability: Good. Patient appears to be a good historian.  He has pill bottles with him today.  It looks like he has follow up for his medications through [place] prescribers, per chart review.     Medication adherence:  Good. conor says he sometimes misses the mid day doses (hydralazine, ferrous sulfate, furosemid), but rarely misses the morning or night medications.  He showed his pill reminder during the interview, and so it appears he keeps medications organized.  Pill bottles had recent fill dates on them.    Changes made to PTA medication list (reason)  Added: none  Deleted:   -Dymista (duplicate)  -prednisone (therapy completed in February 2017)  Changed:   -aspirin (directions)  -atorvastatin (directions are PM)  -furosemide (dose times are AM and 3 PM)  -potassium chloride (dose time)  -omeprazole (dose/dose time)    Additional medication history information (including reliability of information, actions taken by pharmacist): None    Time spent in this activity: 35 minutes    Medication history completed by: Brown Barcenas, PharmD, BCPP    Prior to Admission medications    Medication Sig Last Dose Taking? Auth Provider   omeprazole (PRILOSEC) 20 MG CR capsule Take 20 mg by mouth daily 4/7/2017 at pm Yes Unknown, Entered By History   darbepoetin emily (ARANESP, ALBUMIN FREE,) 100 MCG/0.5ML injection Inject 0.5 mLs (100 mcg) Subcutaneous every 14 days As needed for hgb<10g/dL.  If Hgb increases >1 point in 2 weeks (if blood transfusion given, use hgb PRIOR to this), SYSTOLIC BP > 180 mmHg or hgb>=10g/dL, HOLD DOSE. Dose must be within 1 week of Hgb.  Per anemia protocol with Brice Caraballo MD Past Week Yes Brice Caraballo MD   albuterol (PROAIR HFA/PROVENTIL HFA/VENTOLIN  HFA) 108 (90 BASE) MCG/ACT Inhaler Inhale 2 puffs into the lungs every 6 hours as needed for shortness of breath / dyspnea or wheezing 4/8/2017 at am Yes Amelie Cross PA-C   furosemide (LASIX) 80 MG tablet Take 1 tablet (80 mg) by mouth 2 times daily  Patient taking differently: Take 80 mg by mouth 2 times daily In the morning and at 3 PM 4/8/2017 at am Yes Brice Caraballo MD   atorvastatin (LIPITOR) 40 MG tablet Take 1 tablet (40 mg) by mouth daily  Patient taking differently: Take 40 mg by mouth every evening  4/7/2017 at pm Yes Gilles Valentine MD   hydrALAZINE (APRESOLINE) 50 MG tablet Take 1 tablet (50 mg) by mouth 3 times daily 4/8/2017 at am Yes Yahir Turcios MD   ferrous sulfate (IRON) 325 (65 FE) MG tablet Take 1 tablet (325 mg) by mouth 3 times daily (with meals) 4/8/2017 at am Yes Yahir Turcios MD   amLODIPine (NORVASC) 5 MG tablet 1 TABLET BID- generic ok 4/8/2017 at am Yes Yahir Turcios MD   isosorbide mononitrate (IMDUR) 60 MG 24 hr tablet Take 1 tablet (60 mg) by mouth daily 4/8/2017 at am Yes Yahir Turcios MD   metoprolol (TOPROL XL) 100 MG 24 hr tablet Take 1 tablet (100 mg) by mouth daily 4/8/2017 at am Yes Yahir Turcios MD   lisinopril (PRINIVIL,ZESTRIL) 40 MG tablet Take 1 tablet by mouth. one twice daily 4/8/2017 at am Yes Yahir Turcios MD   potassium chloride SA (K-DUR,KLOR-CON M) 20 MEQ tablet Take 1 tablet (20 mEq) by mouth daily  Patient taking differently: Take 20 mEq by mouth daily Take at 3 PM 4/7/2017 at 3 pm Yes Yahir Turcios MD   ASPIRIN 81 MG OR TABS Take 1 tablet (81 mg) by mouth at bedtime 4/7/2017 at hs Yes Reported, Patient   glipiZIDE (GLUCOTROL) 5 MG tablet Take 1 tablet by mouth. TAKE 1 TABLET BY MOUTH DAILY BEFORE A MEAL More than a month  Yahir Turcios MD   azelastine-fluticasone (DYMISTA) 137-50 MCG/ACT nasal spray Spray 1 spray into both nostrils 2 times daily More than a month at Unknown time  Yahir Turcios  MD Alex   Cholecalciferol (VITAMIN D3 PO) Take 5,000 Units by mouth daily More than a month  Reported, Patient   loratadine (CLARITIN) 10 MG tablet Take 10 mg by mouth daily as needed  More than a month  Reported, Patient

## 2017-04-08 NOTE — IP AVS SNAPSHOT
[address] [address]    Phone:  [phone]                                       After Visit Summary   4/8/2017    Murray Nicholson    MRN: [id number]           After Visit Summary Signature Page     I have received my discharge instructions, and my questions have been answered. I have discussed any challenges I see with this plan with the nurse or doctor.    ..........................................................................................................................................  Patient/Patient Representative Signature      ..........................................................................................................................................  Patient Representative Print Name and Relationship to Patient    ..................................................               ................................................  Date                                            Time    ..........................................................................................................................................  Reviewed by Signature/Title    ...................................................              ..............................................  Date                                                            Time

## 2017-04-08 NOTE — ED PROVIDER NOTES
History     Chief Complaint   Patient presents with     Shortness of Breath     Pt c/o SOB, coughing at night and not havig any energy x 1 week.     HPI  Murray Nicholson is a 62 year old male with a history of type II diabetes- no insukin, hypertension, GERD, CHF and CKD who presents to the Emergency Department for evaluation of shortness of breath, dry cough, and increased fatigue. This began about 2 weeks ago and has been getting progressively worse. Now he has marked sheppard.   Patient has never been on dialysis. He will be on the transplant list as soon as dental work has been completed.  He has had chronic ankle edema- no change from the usual. No fevers or chills. Cough is not productive. No cold sx. No chest pain or palpitations. No nausea or vomiting.  He denies any recent long car rides or flights. He has been having right low back pain that wraps around the right hip and into the groin. He has been seeing a chiropractor for this.    He is on multiple medications- hydralazine 50 mg tid, metoprolol 100 mg bid, pgeutpfukf91 mg bid, isordil 60 mg qd, amlodipine 5 mg bid, lasix 80 mg bid, asa 81 mg qd.    PAST MEDICAL HISTORY  Past Medical History:   Diagnosis Date     (HFpEF) heart failure with preserved ejection fraction (H)      Allergic rhinitis, cause unspecified      Anemia of chronic kidney failure      Ascending aortic aneurysm (H)      Bicuspid aortic valve      CAD (coronary artery disease)      Chronic kidney disease, stage 5 (H)      Chronic kidney disease, stage III (moderate)      CKD (chronic kidney disease)      Congestive heart failure, unspecified      Dyslipidemia      Esophageal reflux      Hypersomnia with sleep apnea, unspecified      Hypertension      MGUS (monoclonal gammopathy of unknown significance)      SHEELA (obstructive sleep apnea)      Type 2 diabetes mellitus (H)      PAST SURGICAL HISTORY  Past Surgical History:   Procedure Laterality Date     LAPAROSCOPIC HERNIORRHAPHY INGUINAL  BILATERAL Bilateral 2015    Procedure: LAPAROSCOPIC HERNIORRHAPHY INGUINAL BILATERAL;  Surgeon: Bobby Mcconnell MD;  Location: [facility] OR     NO HISTORY OF SURGERY       FAMILY HISTORY  Family History   Problem Relation Age of Onset     C.A.D. Father       from-never knew father-age 60     DIABETES Father      Hypertension No family hx of      CEREBROVASCULAR DISEASE Father      Breast Cancer No family hx of      Cancer - colorectal No family hx of      Prostate Cancer No family hx of      SOCIAL HISTORY  Social History   Substance Use Topics     Smoking status: Former Smoker     Packs/day: 1.00     Years: 19.00     Types: Cigarettes     Quit date: 1994     Smokeless tobacco: Never Used     Alcohol use 0.0 oz/week     0 Standard drinks or equivalent per week      Comment: 1 drink per week     MEDICATIONS  No current facility-administered medications for this encounter.      Current Outpatient Prescriptions   Medication     omeprazole (PRILOSEC) 20 MG CR capsule     darbepoetin emily (ARANESP, ALBUMIN FREE,) 100 MCG/0.5ML injection     albuterol (PROAIR HFA/PROVENTIL HFA/VENTOLIN HFA) 108 (90 BASE) MCG/ACT Inhaler     furosemide (LASIX) 80 MG tablet     atorvastatin (LIPITOR) 40 MG tablet     hydrALAZINE (APRESOLINE) 50 MG tablet     ferrous sulfate (IRON) 325 (65 FE) MG tablet     amLODIPine (NORVASC) 5 MG tablet     isosorbide mononitrate (IMDUR) 60 MG 24 hr tablet     metoprolol (TOPROL XL) 100 MG 24 hr tablet     lisinopril (PRINIVIL,ZESTRIL) 40 MG tablet     potassium chloride SA (K-DUR,KLOR-CON M) 20 MEQ tablet     ASPIRIN 81 MG OR TABS     glipiZIDE (GLUCOTROL) 5 MG tablet     azelastine-fluticasone (DYMISTA) 137-50 MCG/ACT nasal spray     Cholecalciferol (VITAMIN D3 PO)     loratadine (CLARITIN) 10 MG tablet     Facility-Administered Medications Ordered in Other Encounters   Medication     bupivacaine 0.25 % - EPINEPHrine 1:200,000 injection     ALLERGIES  Allergies   Allergen Reactions     Cats       Throat tightness     Penicillins Hives     Seasonal Allergies      rhinitis     Shrimp      Throat closes        I have reviewed the Medications, Allergies, Past Medical and Surgical History, and Social History in the Epic system.    Review of Systems   Constitutional: Positive for fatigue. Negative for chills and fever.   HENT: Negative for congestion.    Respiratory: Positive for cough. Negative for shortness of breath.    Cardiovascular: Positive for leg swelling. Negative for chest pain and palpitations.   Gastrointestinal: Negative for nausea and vomiting.   Genitourinary: Negative for dysuria.   Musculoskeletal: Positive for back pain.   Skin: Negative.    Allergic/Immunologic: Negative for immunocompromised state.   Neurological: Negative.    Hematological: Negative.        Physical Exam   BP: 125/66  Pulse: 84  Temp: 98.9  F (37.2  C)  Resp: 16  Weight: 89 kg (196 lb 2 oz)  SpO2: 99 %  Physical Exam   Constitutional: He is oriented to person, place, and time. He appears well-developed. No distress.   No distress at all sitting in bed.  Awake and alert and pleasant   HENT:   Head: Normocephalic and atraumatic.   Neck: Neck supple.   Cardiovascular: Normal rate, regular rhythm, normal heart sounds and intact distal pulses.    No murmur heard.  Pulmonary/Chest: No respiratory distress. Wheezes: diffuse.   Abdominal: Soft. Bowel sounds are normal. He exhibits no mass. There is no tenderness.   Musculoskeletal: He exhibits edema.   Neurological: He is alert and oriented to person, place, and time.   Skin: Skin is warm and dry. He is not diaphoretic.   Psychiatric: He has a normal mood and affect. His behavior is normal. Judgment and thought content normal.   Nursing note and vitals reviewed.      ED Course     ED Course     Procedures        EKG done. NSR rate of 81. Appears to have lvh with ns st t changes. There is no change from ekg11/29/16    cxr shows pulmonary edema- he has had chronic diffuse  infiltrates- see old cxr and reports. This cxr is much changed with findings compatible to pulmonary edema    The pt has a mild acidosis with a bicarb of 13. The lytes are wnl- no hyperkalemia  hgb is stable at 8.3  Labs Ordered and Resulted from Time of ED Arrival Up to the Time of Departure from the ED   CBC WITH PLATELETS DIFFERENTIAL - Abnormal; Notable for the following:        Result Value    RBC Count 3.02 (*)     Hemoglobin 8.3 (*)     Hematocrit 26.9 (*)     MCHC 30.9 (*)     RDW 15.9 (*)     Platelet Count 494 (*)     Absolute Lymphocytes 0.7 (*)     All other components within normal limits   COMPREHENSIVE METABOLIC PANEL - Abnormal; Notable for the following:     Chloride 116 (*)     Carbon Dioxide 13 (*)     Glucose 165 (*)     Urea Nitrogen 115 (*)     Creatinine 6.98 (*)     GFR Estimate 8 (*)     GFR Estimate If Black 10 (*)     Albumin 2.3 (*)     All other components within normal limits   ROUTINE [facility] WITH MICROSCOPIC REFLEX TO CULTURE   STRICT INTAKE AND OUTPUT       Assessments & Plan (with Medical Decision Making)   Well tolerated fluid overload and renal failure. The pt has been on a monitor during stay in the ed. Stable.  He has got 40 mg of iv lasix.  Discussed with [facility] staff. Admission and eval with renal.    I have reviewed the nursing notes.    I have reviewed the findings, diagnosis, plan and need for follow up with the patient.    New Prescriptions    No medications on file       Final diagnoses:   Other hypervolemia, due to increasing chronic renal failure     IColette, am serving as a trained medical scribe to document services personally performed by Med Nuñez MD, based on the provider's statements to me.   Med ALLEN MD, was physically present and have reviewed and verified the accuracy of this note documented by Colette Lacey.    4/8/2017   [facility], EMERGENCY DEPARTMENT     Med Nuñez MD  04/08/17 [id number]

## 2017-04-08 NOTE — IP AVS SNAPSHOT
MRN:[id number]                      After Visit Summary   4/8/2017    Murray Nicholson    MRN: [id number]           Thank you!     Thank you for choosing [facility] for your care. Our goal is always to provide you with excellent care. Hearing back from our patients is one way we can continue to improve our services. Please take a few minutes to complete the written survey that you may receive in the mail after you visit with us. Thank you!        Patient Information     Date Of Birth          1955        Designated Caregiver       Most Recent Value    Caregiver    Will someone help with your care after discharge? yes    Name of designated caregiver Jasper Nicholson    Phone number of caregiver [phone]    Caregiver address Pt unsure of address      About your hospital stay     You were admitted on:  April 8, 2017 You last received care in the:  [address] [facility] [facility]    You were discharged on:  April 13, 2017        Reason for your hospital stay       You were hospitalized for upper and lower extremity weakness.                  Who to Call     For medical emergencies, please call 911.  For non-urgent questions about your medical care, please call your primary care provider or clinic, [phone]          Attending Provider     Provider Specialty    Med Nuñez MD Emergency Medicine    Km Muniz MD [facility]    Guerrero Mcmahon MD Family Practice       Primary Care Provider Office Phone # Fax #    Yahir Alex Turcios [phone]       [address] [address]        After Care Instructions     Activity       Your activity upon discharge: activity as tolerated            Diet       Follow this diet upon discharge: Orders Placed This Encounter      Fluid restriction 2000 ML FLUID      Combination Diet Regular Diet Adult; Renal Diet; 4151-0819 Calories: Moderate Consistent CHO (4-6 CHO units/meal); 2 gm NA Diet                  Follow-up  Appointments     Adult [facility] Follow-up and recommended labs and tests       Follow up with primary care provider, Yahir Turcios, on Monday, 4/17 for hospital follow- up.  The following labs/tests are recommended: BMP.    Follow up with Nephrology next week.  (4/18)  Follow up with Cardiology.  You will need a repeat echo in 2-3 months.  Follow up with Rheumatology ([place] [facility]) 4/20    Appointments on [facility] and/or [facility] (with [facility] provider or service). Call [phone] if you haven't heard regarding these appointments within 7 days of discharge.                  Your next 10 appointments already scheduled     Apr 17, 2017  1:40 PM CDT   Office Visit with Lauren Gonzalez MD   [facility] ([facility])    [address] [place] [id number] [phone]           Bring a current list of meds and any records pertaining to this visit.  For Physicals, please bring immunization records and any forms needing to be filled out.  Please arrive 10 minutes early to complete paperwork.            Apr 18, 2017 12:30 PM CDT   Lab with  LAB   [facility] ([facility])    [address]  [address] [facility] [id number] [phone]            Apr 18, 2017  1:00 PM CDT   (Arrive by 12:45 PM)   INJECTION with [facility] Nurse   [facility] Solid Organ Transplant ([facility])    [address]  [address] [facility] [id number] [phone]            Apr 20, 2017  1:00 PM CDT   (Arrive by 12:45 PM)   New Patient Visit with VERONICA Awan CNP   [facility] Rheumatology ([facility])    [address]  [address] [facility] [id number] [phone]            May 02, 2017 12:30 PM CDT   Lab with  LAB   [facility] ([facility])    [address]  [address] [facility] [id number] [phone]            May 02,  2017  1:00 PM CDT   (Arrive by 12:45 PM)   INJECTION with  [facility] Nurse   [facility] Solid Organ Transplant ([facility])    [address]  [address] [facility] [id number] [phone]            Jul 12, 2017 12:00 PM CDT   Lab with  LAB   [facility] ([facility])    [address] [address] [phone]            Jul 12, 2017  1:00 PM CDT   (Arrive by 12:30 PM)   Return Visit with Brice Caraballo MD   [facility] Nephrology ([facility])    [address] [id number] [phone]              Additional Services     Medication Therapy Management Referral       Reason for referral:  on more than 5 medications and managing chronic disease    This service is designed to help you get the most from your medications.  A specially trained pharmacist will work closely with you and your doctors  to solve any problems related to your medications and to help you get the   best results from taking them.      The Medication Therapy Management staff will call you to schedule an appointment.            Physical Therapy Referral       *This therapy referral will be filtered to a centralized scheduling office at [facility] and the patient will receive a call to schedule an appointment at a [facility] location most convenient for them. *     [facility] provides Physical Therapy evaluation and treatment and many specialty services across the [facility] system.  If requesting a specialty program, please choose from the list below.    If you have not heard from the scheduling office within 2 business days, please call [phone] for all locations, with the exception of [place], please call [phone].  Treatment: Evaluation & Treatment  Special Instructions/Modalities: NONE  Special Programs: None    Please be aware that coverage of these  "services is subject to the terms and limitations of your health insurance plan.  Call member services at your health plan with any benefit or coverage questions.      **Note to Provider:  If you are referring outside of [facility] for the therapy appointment, please list the name of the location in the  special instructions  above, print the referral and give to the patient to schedule the appointment.                  General Recommendations To Control Heart Failure When You Get Home     Instructions To Patients and Families: Please read and check off each of these important instructions as you do them when you get home.           Weight and symptoms      ___ Put a scale in your bathroom  ___ Post a weight chart or calendar next to the scale  ___Weigh yourself every day as soon as you you get up in the morning. You should only be wearing your pajamas. Write your weight on the chart/calendar.  ___ Bring your weight chart/calendar with you to all appointments    ___Call your doctor if you gain 2 pounds in 1 day or 5 pounds in 1 week from your \"dry\" weight (baseline weight). Also call your doctor if you have shortness of breath that gets worse over time, leg swelling or fatigue.         Medicines and diet     ___ Make sure to take your medicines as prescribed.    ___Bring a current list of your medicines and all of your medicine bottles with you to all appointments.    ___ Limit fluids if you still have swelling or shortness of breath, or if your doctor tells you to do so.  ___ Eat less than 2000 mg of sodium (salt) every day. Read food labels, and do not add salt to meals.   ___ Heart healthy diet with low fat and low cholesterol          Activity and suggested lifestyle changes    ___ Stay active. Talk to your doctor about an exercise program that is safe for your heart.    ___ Stop smoking. Reduce alcohol use.      ___ Lose weight if you are overweight. Extra weight puts a lot of stress on the heart.          Control " for Leg Swelling   ___ Keep your legs elevated (raised) as needed for swelling. If swelling is uncomfortable or elevation doesn t help, ask your doctor about using ACE wrap or Jobst stockings.          Follow-up appointments   ___ Make a [facility] appointment with a basic metabolic panel lab draw 3 to 5 days after you leave the hospital. Call one of the following locations:   [facility]  [phone],  [facility] [phone],  [facility]  [phone].     ___ Make sure to take your medications as prescribed and bring an accurate list of your medications and your weight chart/calendar to your follow up appointment at the [facility] for continued education and adjustments          What is the C[facility] clinic?    The [facility] (Cardiomyopathy, Optimization, Rehabilitation, Education) Clinic is a heart failure specialty clinic within the [facility]. At [facility] you will work with nurse practitioners to carefully adjust medicines, get education and learn who and when to call if symptoms appear. [facility] nurses specialize in helping you:    better understand your disease.    slow the progress of your disease.    improve the length and quality of your life.    detect future heart problems before they become life threatening.    avoid hospital stays.            Pending Results     Date and Time Order Name Status Description    4/12/2017 0913 Blood culture Preliminary     4/12/2017 0913 Blood culture Preliminary             Statement of Approval     Ordered          04/13/17 1550  I have reviewed and agree with all the recommendations and orders detailed in this document.  EFFECTIVE NOW     Approved and electronically signed by:  Guerrero Mcmahon MD             Admission Information     Date & Time Provider Department Dept. Phone    4/8/2017 Guerrero Mcmahon MD [address] [facility]  "[phone]      Your Vitals Were     Blood Pressure Pulse Temperature Respirations Height Weight    112/62 (BP Location: Left arm) 91 98.1  F (36.7  C) (Oral) 16 1.753 m (5' 9\") 84.1 kg (185 lb 7 oz)    Pulse Oximetry BMI (Body Mass Index)                99% 27.38 kg/m2          [employer] Information     [employer] gives you secure access to your electronic health record. If you see a primary care provider, you can also send messages to your care team and make appointments. If you have questions, please call your primary care clinic.  If you do not have a primary care provider, please call [phone] and they will assist you.        Care EveryWhere ID     This is your Care EveryWhere ID. This could be used by other organizations to access your [facility] medical records  [id number]           Review of your medicines      START taking        Dose / Directions    acetaminophen 325 MG tablet   Commonly known as:  TYLENOL   Used for:  Gout, unspecified cause, unspecified chronicity, unspecified site        Dose:  325-650 mg   Take 1-2 tablets (325-650 mg) by mouth every 4 hours as needed for mild pain Do not take more than 10 tablets in 24 hours   Quantity:  100 tablet   Refills:  0       allopurinol 100 MG tablet   Commonly known as:  ZYLOPRIM   Used for:  Gout, unspecified cause, unspecified chronicity, unspecified site        Dose:  50 mg   Take 0.5 tablets (50 mg) by mouth daily   Quantity:  30 tablet   Refills:  0       docusate sodium 100 MG capsule   Commonly known as:  COLACE   Used for:  Constipation, unspecified constipation type        Dose:  100 mg   Take 1 capsule (100 mg) by mouth 2 times daily   Quantity:  60 capsule   Refills:  1       losartan 100 MG tablet   Commonly known as:  COZAAR   Used for:  Renal hypertension, stage 1-4 or unspecified chronic kidney disease        Dose:  100 mg   Start taking on:  4/14/2017   Take 1 tablet (100 mg) by mouth daily   Quantity:  30 tablet   Refills:  0       " metolazone 2.5 MG tablet   Commonly known as:  ZAROXOLYN        Dose:  2.5 mg   Take 1 tablet (2.5 mg) by mouth daily   Quantity:  30 tablet   Refills:  0       polyethylene glycol Packet   Commonly known as:  MIRALAX/GLYCOLAX   Used for:  Constipation, unspecified constipation type        Dose:  17 g   Take 17 g by mouth daily as needed for constipation   Quantity:  10 packet   Refills:  0       predniSONE 10 MG tablet   Commonly known as:  DELTASONE   Used for:  Gout, unspecified cause, unspecified chronicity, unspecified site        Dose:  10 mg   Start taking on:  4/14/2017   Take 1 tablet (10 mg) by mouth daily Take 4 tablets for 4 days (40 mg/day), followed by 2 tablets for 5 days (20 mg/day), followed by 1 tablet daily (10 mg/day)   Quantity:  50 tablet   Refills:  0       sodium bicarbonate 650 MG tablet   Used for:  Chronic kidney disease, unspecified        Dose:  1300 mg   Take 2 tablets (1,300 mg) by mouth 3 times daily   Quantity:  90 tablet   Refills:  0         CONTINUE these medicines which may have CHANGED, or have new prescriptions. If we are uncertain of the size of tablets/capsules you have at home, strength may be listed as something that might have changed.        Dose / Directions    atorvastatin 40 MG tablet   Commonly known as:  LIPITOR   This may have changed:  when to take this   Used for:  CKD (chronic kidney disease) stage 3, GFR 30-59 ml/min, Hyperlipidemia LDL goal <100        Dose:  40 mg   Take 1 tablet (40 mg) by mouth daily   Quantity:  90 tablet   Refills:  1       ferrous sulfate 325 (65 FE) MG tablet   Commonly known as:  IRON   This may have changed:  when to take this   Used for:  Iron deficiency anemia, unspecified iron deficiency anemia type        Dose:  325 mg   Take 1 tablet (325 mg) by mouth 2 times daily   Quantity:  100 tablet   Refills:  0       furosemide 80 MG tablet   Commonly known as:  LASIX   This may have changed:  additional instructions   Used for:   Congestive heart failure, unspecified congestive heart failure chronicity, unspecified congestive heart failure type (H)        Dose:  80 mg   Take 1 tablet (80 mg) by mouth 2 times daily In the morning and at 3 PM   Quantity:  120 tablet   Refills:  0         CONTINUE these medicines which have NOT CHANGED        Dose / Directions    albuterol 108 (90 BASE) MCG/ACT Inhaler   Commonly known as:  PROAIR HFA/PROVENTIL HFA/VENTOLIN HFA   Used for:  Cough        Dose:  2 puff   Inhale 2 puffs into the lungs every 6 hours as needed for shortness of breath / dyspnea or wheezing   Quantity:  1 Inhaler   Refills:  0       aspirin 81 MG tablet        Take 1 tablet (81 mg) by mouth at bedtime   Refills:  0       azelastine-fluticasone 137-50 MCG/ACT nasal spray   Commonly known as:  DYMISTA   Used for:  Nasal congestion        Dose:  1 spray   Spray 1 spray into both nostrils 2 times daily   Quantity:  23 g   Refills:  11       darbepoetin emily 100 MCG/0.5ML injection   Commonly known as:  ARANESP (ALBUMIN FREE)   Used for:  Anemia in stage 5 chronic kidney disease (H), CKD (chronic kidney disease) stage 5, GFR less than 15 ml/min (H)        Dose:  100 mcg   Inject 0.5 mLs (100 mcg) Subcutaneous every 14 days As needed for hgb<10g/dL.  If Hgb increases >1 point in 2 weeks (if blood transfusion given, use hgb PRIOR to this), SYSTOLIC BP > 180 mmHg or hgb>=10g/dL, HOLD DOSE. Dose must be within 1 week of Hgb.  Per anemia protocol with Brice Caraballo MD   Quantity:  0.5 mL   Refills:  99       glipiZIDE 5 MG tablet   Commonly known as:  GLUCOTROL   Used for:  Type 2 diabetes mellitus with other specified complication (H)        Start taking on:  4/14/2017   Take 1 tablet by mouth. TAKE 1 TABLET BY MOUTH DAILY BEFORE A MEAL   Quantity:  90 tablet   Refills:  0       hydrALAZINE 50 MG tablet   Commonly known as:  APRESOLINE   Used for:  Type 2 diabetes mellitus with stage 5 chronic kidney disease not on chronic dialysis (H)         Dose:  50 mg   Take 1 tablet (50 mg) by mouth 3 times daily   Quantity:  270 tablet   Refills:  3       isosorbide mononitrate 60 MG 24 hr tablet   Commonly known as:  IMDUR   Used for:  Chronic systolic congestive heart failure (H)        Dose:  60 mg   Take 1 tablet (60 mg) by mouth daily   Quantity:  90 tablet   Refills:  3       loratadine 10 MG tablet   Commonly known as:  CLARITIN   Used for:  Hypertensive cardiopathy, SOB (shortness of breath)        Dose:  10 mg   Take 10 mg by mouth daily as needed   Refills:  0       metoprolol 100 MG 24 hr tablet   Commonly known as:  TOPROL XL   Used for:  Chronic systolic congestive heart failure (H)        Dose:  100 mg   Take 1 tablet (100 mg) by mouth daily   Quantity:  90 tablet   Refills:  3       omeprazole 20 MG CR capsule   Commonly known as:  priLOSEC        Dose:  20 mg   Take 20 mg by mouth daily   Refills:  0       VITAMIN D3 PO        Dose:  5000 Units   Take 5,000 Units by mouth daily   Refills:  0         STOP taking     amLODIPine 5 MG tablet   Commonly known as:  NORVASC           lisinopril 40 MG tablet   Commonly known as:  PRINIVIL/ZESTRIL           potassium chloride SA 20 MEQ CR tablet   Commonly known as:  K-DUR/KLOR-CON M                Where to get your medicines      These medications were sent to [facility] [place], [place] - [address] [place] [address]     Phone:  [phone]     acetaminophen 325 MG tablet    allopurinol 100 MG tablet    docusate sodium 100 MG capsule    ferrous sulfate 325 (65 FE) MG tablet    furosemide 80 MG tablet    glipiZIDE 5 MG tablet    losartan 100 MG tablet    metolazone 2.5 MG tablet    polyethylene glycol Packet    sodium bicarbonate 650 MG tablet         Some of these will need a paper prescription and others can be bought over the counter. Ask your nurse if you have questions.     Bring a paper prescription for each of these medications     predniSONE 10  MG tablet                Protect others around you: Learn how to safely use, store and throw away your medicines at www.disposemymeds.org.             Medication List: This is a list of all your medications and when to take them. Check marks below indicate your daily home schedule. Keep this list as a reference.      Medications           Morning Afternoon Evening Bedtime As Needed    acetaminophen 325 MG tablet   Commonly known as:  TYLENOL   Take 1-2 tablets (325-650 mg) by mouth every 4 hours as needed for mild pain Do not take more than 10 tablets in 24 hours   Last time this was given:  650 mg on 4/13/2017  8:45 AM                                albuterol 108 (90 BASE) MCG/ACT Inhaler   Commonly known as:  PROAIR HFA/PROVENTIL HFA/VENTOLIN HFA   Inhale 2 puffs into the lungs every 6 hours as needed for shortness of breath / dyspnea or wheezing                                allopurinol 100 MG tablet   Commonly known as:  ZYLOPRIM   Take 0.5 tablets (50 mg) by mouth daily   Last time this was given:  50 mg on 4/13/2017 11:26 AM                                aspirin 81 MG tablet   Take 1 tablet (81 mg) by mouth at bedtime                                atorvastatin 40 MG tablet   Commonly known as:  LIPITOR   Take 1 tablet (40 mg) by mouth daily   Last time this was given:  40 mg on 4/12/2017  7:28 PM                                azelastine-fluticasone 137-50 MCG/ACT nasal spray   Commonly known as:  DYMISTA   Spray 1 spray into both nostrils 2 times daily                                darbepoetin emily 100 MCG/0.5ML injection   Commonly known as:  ARANESP (ALBUMIN FREE)   Inject 0.5 mLs (100 mcg) Subcutaneous every 14 days As needed for hgb<10g/dL.  If Hgb increases >1 point in 2 weeks (if blood transfusion given, use hgb PRIOR to this), SYSTOLIC BP > 180 mmHg or hgb>=10g/dL, HOLD DOSE. Dose must be within 1 week of Hgb.  Per anemia protocol with Brice Caraballo MD                                docusate sodium  100 MG capsule   Commonly known as:  COLACE   Take 1 capsule (100 mg) by mouth 2 times daily   Last time this was given:  100 mg on 4/13/2017  8:54 AM                                ferrous sulfate 325 (65 FE) MG tablet   Commonly known as:  IRON   Take 1 tablet (325 mg) by mouth 2 times daily   Last time this was given:  325 mg on 4/13/2017  8:53 AM                                furosemide 80 MG tablet   Commonly known as:  LASIX   Take 1 tablet (80 mg) by mouth 2 times daily In the morning and at 3 PM   Last time this was given:  80 mg on 4/13/2017  8:54 AM                                glipiZIDE 5 MG tablet   Commonly known as:  GLUCOTROL   Take 1 tablet by mouth. TAKE 1 TABLET BY MOUTH DAILY BEFORE A MEAL   Start taking on:  4/14/2017   Last time this was given:  5 mg on 4/13/2017  9:08 AM                                hydrALAZINE 50 MG tablet   Commonly known as:  APRESOLINE   Take 1 tablet (50 mg) by mouth 3 times daily   Last time this was given:  50 mg on 4/13/2017  1:31 PM                                isosorbide mononitrate 60 MG 24 hr tablet   Commonly known as:  IMDUR   Take 1 tablet (60 mg) by mouth daily   Last time this was given:  60 mg on 4/13/2017  8:54 AM                                loratadine 10 MG tablet   Commonly known as:  CLARITIN   Take 10 mg by mouth daily as needed                                losartan 100 MG tablet   Commonly known as:  COZAAR   Take 1 tablet (100 mg) by mouth daily   Start taking on:  4/14/2017   Last time this was given:  50 mg on 4/13/2017  8:45 AM                                metolazone 2.5 MG tablet   Commonly known as:  ZAROXOLYN   Take 1 tablet (2.5 mg) by mouth daily   Last time this was given:  2.5 mg on 4/13/2017  8:54 AM                                metoprolol 100 MG 24 hr tablet   Commonly known as:  TOPROL XL   Take 1 tablet (100 mg) by mouth daily   Last time this was given:  100 mg on 4/13/2017  8:47 AM                                 omeprazole 20 MG CR capsule   Commonly known as:  priLOSEC   Take 20 mg by mouth daily   Last time this was given:  20 mg on 4/13/2017  8:54 AM                                polyethylene glycol Packet   Commonly known as:  MIRALAX/GLYCOLAX   Take 17 g by mouth daily as needed for constipation   Last time this was given:  17 g on 4/11/2017  8:04 AM                                predniSONE 10 MG tablet   Commonly known as:  DELTASONE   Take 1 tablet (10 mg) by mouth daily Take 4 tablets for 4 days (40 mg/day), followed by 2 tablets for 5 days (20 mg/day), followed by 1 tablet daily (10 mg/day)   Start taking on:  4/14/2017   Last time this was given:  40 mg on 4/13/2017  9:05 AM                                sodium bicarbonate 650 MG tablet   Take 2 tablets (1,300 mg) by mouth 3 times daily   Last time this was given:  1,300 mg on 4/13/2017  1:31 PM                                VITAMIN D3 PO   Take 5,000 Units by mouth daily   Last time this was given:  5,000 Units on 4/13/2017  8:46 AM                                          More Information        Gout    Gout or is an inflammation of a joint due to a build-up of gout crystals in the joint fluid. This occurs when there is an excess of uric acid (a normal waste product) in the body. Uric acid builds up in the body when the kidneys are unable to filter enough of it from the blood. This may occur with age. It is also associated with kidney disease. Gout occurs more often in persons with obesity, diabetes, hypertension, or high levels of fats in the blood. It may be run in families. Gout tends to come and go. A flare up of gout is called an attack. Drinking alcohol or eating certain foods (such as shellfish or foods with additives such as high-fructose corn syrup) may increase uric acid levels in the blood and cause a gout attack.  During a gout attack, the affected joint may become a hot, red, swollen and painful. If you have had one attack of gout, you are  likely to have another. An attack of gout can be treated with medicine. If these attacks become frequent, a daily medicine may be prescribed to help the kidneys remove uric acid from the body.  Home care  During a gout attack:    Rest painful joints. If gout affects the joints of your foot or leg, you may want to use crutches for the first few days to keep from bearing weight on the affected joint.    When sitting or lying down, raise the painful joint to a level higher than your heart.    Apply an ice pack (ice cubes in a plastic bag wrapped in a thin towel) over the injured area for 20 minutes every 1-2 hours the first day for pain relief. Continue this 3-4 times a day for swelling and pain.    Avoid alcohol and foods listed below (see Preventing attacks) during a gout attack. Drink extra fluid to help flush the uric acid through your kidneys.    If you were prescribed a medication to treat gout, take it as your healthcare provider has instructed. Don't skip doses.    Take anti-inflammatory medicine as directed.     If pain medicines have been prescribed, take them exactly as directed.    Preventing attacks    Minimize or avoid alcohol use. Excess alcohol intake can cause a gout attack.    Limit these foods and beverages:    Organ meats, such as kidneys and liver    Certain seafoods (anchovies, sardines, shrimp, scallops, herring, mackerel)    Wild game, meat extracts and meat gravies    Foods and beverages sweetened with high-fructose corn syrup, such as sodas    Eat a healthy diet including low-fat and nonfat dairy, whole grains, and vegetables.    If you are overweight, talk to your healthcare provider about a weight reduction plan. Avoid fasting or extreme low calorie diets (less than 900 calories per day). This will increase uric acid levels in the body.    If you have diabetes or high blood pressure, work with your doctor to manage these conditions.    Protect the joint from injury. Trauma can trigger a gout  attack.  Follow-up care  Follow up with your healthcare provider or as advised.   When to seek medical advice  Call your healthcare provider if you have any of the following:    Fever over 100.4 F (38. C) with worsening joint pain    Increasing redness around the joint    Pain developing in another joint    Repeated vomiting, abdominal pain, or blood in the vomit or stool (black or red color)    3911-3597 [employer]. [address] [place] [address]. All rights reserved. This information is not intended as a substitute for professional medical care. Always follow your healthcare professional's instructions.                Gout Diet  Gout is a painful condition caused by an excess of uric acid, a waste product made by the body. Uric acid forms crystals that collect in the joints. This brings on symptoms of joint pain and swelling. This is called a gout attack. Often, medications and diet changes are combined to manage gout. Below are some guidelines for changing your diet to help you manage gout and prevent attacks. Your health care provider will help you determine the best eating plan for you.     Limiting or avoiding certain foods can help prevent gout attacks.   Eating to manage gout  Weight loss for those who are overweight may help reduce gout attacks.  Eat less of these foods  Eating too many foods containing purines may raise the levels of uric acid in your body. This raises your risk for a gout attack. Try to limit these foods and drinks:    Alcohol, such as beer and red wine. You may be told to avoid alcohol completely.    Soft drinks that contain sugar or high fructose corn syrup    Certain fish, including anchovies, sardines, fish eggs, and herring    Certain meats, such as red meat, hot dogs, luncheon meats, and turkey    Organ meats, such as liver, kidneys, and sweetbreads    Legumes, such as dried beans and peas    Mushrooms, spinach, asparagus, and cauliflower    Other high fat  foods such as gravy, whole milk, and high fat cheeses  Eat more of these foods  Other foods may be helpful for people with gout. Add some of these foods to your diet:    Dark berries, such as blueberries, blackberries, and cherries. These contain chemicals that may lower uric acid.    Tofu, a source of protein made from soy. Studies have shown that it may be a better choice than meat for people with gout.    Omega fatty acids. These are found in some fatty fish such as salmon, certain oils (flax, olive, or nut), and nuts themselves. Omega fatty acids may help prevent inflammation due to gout.    Dairy products that are low-fat or fat-free, such as cheese and yogurt    Complex carbohydrate foods, including whole grains, brown rice, oats, and beans    Coffee, in moderation  Follow-up care  Follow up with your health care provider, or as advised.  When to seek medical advice  Call your health care provider right away if any of these occur:    Return of gout symptoms, usually at night:    Severe pain, swelling, and heat in a joint, especially the base of the big toe    Affected joint is hard to move    Skin of the affected joint is purple or red    Fever of 100.4 F (38 C) or higher    Pain that doesn't get better even with prescribed medicine     6044-8666 [facility]. [address] [place] [address], [place] [address]. All rights reserved. This information is not intended as a substitute for professional medical care. Always follow your healthcare professional's instructions.                Eating to Prevent Gout  Gout is a painful form of arthritis caused by an excess of uric acid. This is a waste product made by the body. It builds up in the body and forms crystals that collect in the joints, bringing on a gout attack. Alcohol and certain foods can trigger a gout attack. Below are some guidelines for changing your diet to help you manage gout. Your healthcare provider can work with you to determine the best  eating plan for you. Know that diet is only one part of managing gout. Take your medicines as prescribed and follow the other guidelines your healthcare provider has given you.  Foods to limit  Eating too many foods containing purines may increase the levels of uric acid in your body and increase your risk for a gout attack. It may be best to limit these high-purine foods:    Alcohol (beer, red wine). You may be told to avoid alcohol completely.    Certain fish (anchovies, sardines, fish roes, herring, tuna, mussels, codfish, scallops, trout, and nirali)    Certain meats (red meat, processed meat, brown, turkey, wild game, and goose)    Sauces and gravies made with meat    Organ meats (such as liver, kidneys, sweetbreads, and tripe)    Legumes (such as dried beans, peas)    Mushrooms, spinach, asparagus, and cauliflower    Yeast and yeast extract supplements  Foods to try  Some foods may be helpful for people with gout. You may want to try adding some of the following foods to your diet:    Dark berries: These include blueberries, blackberries, and cherries. These berries contain chemicals that may lower uric acid.    Tofu: Tofu, which is made from soy, is a good source of protein. Studies have shown that it may be a better choice than meat for people with gout.    Omega fatty acids: These acids are found in fatty fish (such as salmon), certain oils (such as flax, olive, or nut oils), or nuts. They may help prevent inflammation due to gout.  The following guidelines are recommended by the American Medical Association for people with gout. Your diet should be:    High in fiber, whole grains, fruits, and vegetables.    Low in protein (15% of calories should come from protein. Choose lean sources such as soy, lean meats, and poultry).    Low in fat (no more than 30% of calories should come from fat, with only 10% coming from animal fat).     6782-2538 [employer]. [address] [place] [address], [place]  [address]. All rights reserved. This information is not intended as a substitute for professional medical care. Always follow your healthcare professional's instructions.

## 2017-04-08 NOTE — H&P
[facility]  Inpatient History and Physical    Murray Nicholson MRN# [id number]   Age: 62 year old YOB: 1955/2017 6:34 PM    Primary care provider: Yahir Turcios          Chief Concern:   Shortness of breath, cough       History is obtained from the patient          History of Present Illness (Resident / Clinician):   Murray Nicholson is a 62 year old male with a significant past medical history of chronic kidney disease 5, congestive heart failure (systolic and diastolic), diabetes, hyperlipidemia, coronary artery disease, chronic anemia, and hypertension who presents with worsening cough and shortness of breath for 1.5 weeks.  He says that he first noticed the shortness of breath while lying flat on his chiropractor's table about 1.5 weeks ago.  He was being seen there for recent acute back pain with radiation to the bilateral anterolateral thighs; these symptoms are improving with chiropractic care.  The shortness of breath has been gradually worsening since that time.  He says it is most noticeable at night and with mild exertion.  He can only go up a few steps before getting short of breath which is new.  He sleeps with his head elevated with a wedge and several pillows.  He also has a cough for 1.5 weeks which he describes as mostly dry but sometimes productive of frothy clear sputum.  The cough is worst at night and he has been taking nightly nyquil which has helped his symptoms for 1 week.  He says that he also has had leg swelling over the last 1-2 days but it was improving before that.  He denies any chest pain, palpitations, decreased urination, fever, chills, nausea, vomiting, diarrhea, abdominal pain, light headedness, or syncope.    Of note patient says that he ran out of home lasix about 2 weeks ago and was unable to refill it due to financial reasons for 1 week.  He says he  has been taking it as scheduled since it was refilled for the last week.    Old records were reviewed, and any additions to pertinent history are noted above.           Past Medical History:     Past Medical History:   Diagnosis Date     (HFpEF) heart failure with preserved ejection fraction (H)      Allergic rhinitis, cause unspecified      Anemia of chronic kidney failure      Ascending aortic aneurysm (H)      Bicuspid aortic valve      CAD (coronary artery disease)      Chronic kidney disease, stage 5 (H)      Chronic kidney disease, stage III (moderate)      CKD (chronic kidney disease)      Congestive heart failure, unspecified      Dyslipidemia      Esophageal reflux      Hypersomnia with sleep apnea, unspecified      Hypertension      MGUS (monoclonal gammopathy of unknown significance)      SHEELA (obstructive sleep apnea)      Type 2 diabetes mellitus (H)              Past Surgical History:     Past Surgical History:   Procedure Laterality Date     LAPAROSCOPIC HERNIORRHAPHY INGUINAL BILATERAL Bilateral 7/24/2015    Procedure: LAPAROSCOPIC HERNIORRHAPHY INGUINAL BILATERAL;  Surgeon: Bobby Mcconnell MD;  Location:  OR     NO HISTORY OF SURGERY               Social History:   I have reviewed this patient's social history   no alcohol use  no illicit drug use  Former smoker           Family History:   This patient has no significant family history            Immunizations:   Immunization status is unknown          Allergies:   Cats; Penicillins; Seasonal allergies; and Shrimp          Medications:     Current Facility-Administered Medications on File Prior to Encounter:  bupivacaine 0.25 % - EPINEPHrine 1:200,000 injection     Current Outpatient Prescriptions on File Prior to Encounter:  darbepoetin emily (ARANESP, ALBUMIN FREE,) 100 MCG/0.5ML injection Inject 0.5 mLs (100 mcg) Subcutaneous every 14 days As needed for hgb<10g/dL. If Hgb increases >1 point in 2 weeks (if blood transfusion given, use hgb  PRIOR to this), SYSTOLIC BP > 180 mmHg or hgb>=10g/dL, HOLD DOSE. Dose must be within 1 week of Hgb.  Per anemia protocol with Brice Caraballo MD   albuterol (PROAIR HFA/PROVENTIL HFA/VENTOLIN HFA) 108 (90 BASE) MCG/ACT Inhaler Inhale 2 puffs into the lungs every 6 hours as needed for shortness of breath / dyspnea or wheezing   furosemide (LASIX) 80 MG tablet Take 1 tablet (80 mg) by mouth 2 times daily (Patient taking differently: Take 80 mg by mouth 2 times daily In the morning and at 3 PM)   atorvastatin (LIPITOR) 40 MG tablet Take 1 tablet (40 mg) by mouth daily (Patient taking differently: Take 40 mg by mouth every evening )   hydrALAZINE (APRESOLINE) 50 MG tablet Take 1 tablet (50 mg) by mouth 3 times daily   ferrous sulfate (IRON) 325 (65 FE) MG tablet Take 1 tablet (325 mg) by mouth 3 times daily (with meals)   amLODIPine (NORVASC) 5 MG tablet 1 TABLET BID- generic ok   isosorbide mononitrate (IMDUR) 60 MG 24 hr tablet Take 1 tablet (60 mg) by mouth daily   metoprolol (TOPROL XL) 100 MG 24 hr tablet Take 1 tablet (100 mg) by mouth daily   lisinopril (PRINIVIL,ZESTRIL) 40 MG tablet Take 1 tablet by mouth. one twice daily   potassium chloride SA (K-DUR,KLOR-CON M) 20 MEQ tablet Take 1 tablet (20 mEq) by mouth daily (Patient taking differently: Take 20 mEq by mouth daily Take at 3 PM)   ASPIRIN 81 MG OR TABS Take 1 tablet (81 mg) by mouth at bedtime   glipiZIDE (GLUCOTROL) 5 MG tablet Take 1 tablet by mouth. TAKE 1 TABLET BY MOUTH DAILY BEFORE A MEAL   azelastine-fluticasone (DYMISTA) 137-50 MCG/ACT nasal spray Spray 1 spray into both nostrils 2 times daily   Cholecalciferol (VITAMIN D3 PO) Take 5,000 Units by mouth daily   loratadine (CLARITIN) 10 MG tablet Take 10 mg by mouth daily as needed             Review of Systems:   Review of Systems   Constitutional: Positive for fatigue and unexpected weight change (lost ~30 pound over last 3 months due to pneumonia for which he was not hospitalized). Negative for  "chills and fever.   HENT: Positive for rhinorrhea (x 1 week). Negative for sore throat.    Respiratory: Positive for cough. Negative for chest tightness, shortness of breath and wheezing.    Cardiovascular: Positive for leg swelling. Negative for chest pain and palpitations.   Gastrointestinal: Negative for abdominal distention, abdominal pain, constipation, diarrhea, nausea and vomiting.   Genitourinary: Negative for decreased urine volume, dysuria, flank pain and hematuria.   Musculoskeletal: Positive for back pain, gait problem and myalgias (bilateral lisa-lateral proximal thigh and gluteal pain that begins in low back x 2 weeks). Negative for arthralgias, neck pain and neck stiffness.   Skin: Negative for rash.   Neurological: Negative for light-headedness and headaches.               Physical Exam:     Vitals were reviewed  Patient Vitals for the past 12 hrs:   BP Temp Temp src Pulse Heart Rate Resp SpO2 Height Weight   04/08/17 2110 142/77 98.2  F (36.8  C) Oral 91 92 22 96 % - -   04/08/17 2101 - - - - - - - 1.753 m (5' 9\") 88.4 kg (194 lb 14.4 oz)   04/08/17 2019 152/83 97.9  F (36.6  C) Oral - 91 17 - - -   04/08/17 1911 150/85 98.7  F (37.1  C) Oral - 86 (!) 33 94 % - -   04/08/17 1855 144/76 97.7  F (36.5  C) Oral 83 - 26 96 % - -   04/08/17 1845 144/76 97.7  F (36.5  C) Oral 83 - 26 96 % - -   04/08/17 1700 134/80 - - 85 - 26 95 % - -   04/08/17 1600 132/80 - - 82 - 16 99 % - -   04/08/17 1527 129/73 - - 81 - 16 99 % - -   04/08/17 1443 125/66 98.9  F (37.2  C) Oral 84 - 16 99 % - 89 kg (196 lb 2 oz)     Physical Exam   Constitutional: He is oriented to person, place, and time. He appears well-developed and well-nourished. No distress.   HENT:   Mucosa moist   Eyes: Pupils are equal, round, and reactive to light.   Neck: Neck supple.   Cardiovascular: Normal rate, regular rhythm and normal heart sounds.  Exam reveals no gallop.    No murmur heard.  Pulmonary/Chest: Effort normal. No respiratory " distress. He has no wheezes.   Mild bibasilar crackles   Abdominal: Soft. There is no tenderness. There is no rebound and no guarding.   Musculoskeletal: He exhibits edema (3+ pitting edema bilateral lower extremities to proximal leg below the knee). He exhibits no tenderness.   No midline spinal or paraspinal back tenderness, no gluteal or anterolateral thigh tenderness   Neurological: He is alert and oriented to person, place, and time.   Skin: Skin is warm and dry.   Psychiatric: He has a normal mood and affect.            Data:     Results for orders placed or performed during the hospital encounter of 04/08/17 (from the past 24 hour(s))   CBC with platelets differential   Result Value Ref Range    WBC 8.2 4.0 - 11.0 10e9/L    RBC Count 3.02 (L) 4.4 - 5.9 10e12/L    Hemoglobin 8.3 (L) 13.3 - 17.7 g/dL    Hematocrit 26.9 (L) 40.0 - 53.0 %    MCV 89 78 - 100 fl    MCH 27.5 26.5 - 33.0 pg    MCHC 30.9 (L) 31.5 - 36.5 g/dL    RDW 15.9 (H) 10.0 - 15.0 %    Platelet Count 494 (H) 150 - 450 10e9/L    Diff Method Automated Method     % Neutrophils 78.5 %    % Lymphocytes 8.9 %    % Monocytes 3.9 %    % Eosinophils 7.5 %    % Basophils 0.6 %    % Immature Granulocytes 0.6 %    Nucleated RBCs 0 0 /100    Absolute Neutrophil 6.4 1.6 - 8.3 10e9/L    Absolute Lymphocytes 0.7 (L) 0.8 - 5.3 10e9/L    Absolute Monocytes 0.3 0.0 - 1.3 10e9/L    Absolute Eosinophils 0.6 0.0 - 0.7 10e9/L    Absolute Basophils 0.1 0.0 - 0.2 10e9/L    Abs Immature Granulocytes 0.1 0 - 0.4 10e9/L    Absolute Nucleated RBC 0.0    Comprehensive metabolic panel   Result Value Ref Range    Sodium 142 133 - 144 mmol/L    Potassium 4.8 3.4 - 5.3 mmol/L    Chloride 116 (H) 94 - 109 mmol/L    Carbon Dioxide 13 (L) 20 - 32 mmol/L    Anion Gap 13 3 - 14 mmol/L    Glucose 165 (H) 70 - 99 mg/dL    Urea Nitrogen 115 (H) 7 - 30 mg/dL    Creatinine 6.98 (H) 0.66 - 1.25 mg/dL    GFR Estimate 8 (L) >60 mL/min/1.7m2    GFR Estimate If Black 10 (L) >60 mL/min/1.7m2     Calcium 8.9 8.5 - 10.1 mg/dL    Bilirubin Total 0.4 0.2 - 1.3 mg/dL    Albumin 2.3 (L) 3.4 - 5.0 g/dL    Protein Total 6.9 6.8 - 8.8 g/dL    Alkaline Phosphatase 92 40 - 150 U/L    ALT 18 0 - 70 U/L    AST 13 0 - 45 U/L   Chest XR,  PA & LAT    Narrative    CHEST TWO VIEW   4/8/2017 3:57 PM     HISTORY: SOA, renal failure.    COMPARISON: None.    FINDINGS: Cardiomegaly. Extensive perihilar interstitial and alveolar  opacities increased from 12/14/2016. This has the appearance of  pulmonary edema.      Impression    IMPRESSION: Extensive bilateral opacities suggest a pattern of  pulmonary edema. Cardiomegaly.    BAL JAFFE MD      EKG results:   Performed today        NSR rate of 81. Appears to have lvh with no significant st t changes. There is no change from ekg11/29/16      All imaging studies reviewed by me.        Assessment and Plan:   Assessment:   Murray Nicholson is a 62 year old male with a significant past medical history of chronic kidney disease 5, congestive heart failure (systolic and diastolic), diabetes, hyperlipidemia, coronary artery disease, chronic anemia, and hypertension who presents with fluid overload and acute on chronic kidney injury.  Patient Active Problem List   Diagnosis     Esophageal reflux     Hypersomnia with sleep apnea     Allergic rhinitis     CHF (congestive heart failure) (H)     CKD (chronic kidney disease) stage 5, GFR less than 15 ml/min (H)     Hyperlipidemia LDL goal <100     Hypertension goal BP (blood pressure) < 130/80     Hypertensive cardiopathy     Tubular adenoma     Anemia of chronic disease     Bicuspid aortic valve     CAD (coronary artery disease)     (HFpEF) heart failure with preserved ejection fraction (H)     Anemia in chronic renal disease     Hypertension     Dyslipidemia     MGUS (monoclonal gammopathy of unknown significance)     Ascending aortic aneurysm (H)     Type 2 diabetes mellitus with diabetic chronic kidney disease (H)     Anemia, iron  deficiency     Anemia in stage 5 chronic kidney disease (H)         Plan:   ## Fluid overload  ## Acute on chronic kidney injury  ## Combined systolic and diastolic CHF, decompensated, EF40-45% 11/16  - Diuresis with IV lasix 80mg now, will repeat or adjust dose as appropriate  - Strict ins and out, goal 2L net negative  - Renal, low sodium, carbohydrate controlled diet  - 2 L/24hr fluid restriction  - Nephrology consult; patient has already discussed potential need for dialysis with his primary nephrologist and would prefer peritoneal dialysis.  No need for emergent dialysis at this time  - Will closely monitor renal function and electrolytes  - Renal transplant has been discussed with the patient but he has yet to complete dental evaluationand colonoscopy which are required prior being considered for transplant list    ## Non-Anion Gap Metabolic Acidosis  - Likely related to hyperchloremia in setting of acute on chronic kidney injury  - Will monitor BMP and draw blood gas if needed    ## Radicular Lumbago  - No current symptoms, no midline tenderness or reproducible symptoms at present; no evidence of trauma, fracture, or cauda equina  - History suggestive of sciatica  - No further workup at this time unless symptom progression  - F/U with PCP for further evaluation as necessary    Chronic medical conditions    ## DM2  - Last HbA1c 6.3 11/16, will recheck in AM  Home Regimen: Glipizide 5mg PO daily  Hospital Regimen: Medium intensity ISS with glucose monitoring AC, QHS, and 2am    ## Chronic anemia  - Hold Iron supplementation for now  - Continue home darbepoetin emily Q14 day(last dose 4/4/17)    ## Hypertension  ## CAD - single vessel disease (LAD 60-70% focal stenosis on cardiac cath 1/17)  ## Hyperlipidemia  - Continue home atorvastatin 40mg qhs  - Continue home Metoprolol XR 100mg q24h  - Continue home hydralazine 50mg daily  - Continue home Imdur 60mg q24h  - Continue home lisinopril 40 mg daily    ##  Ascending aortic aneurysm - stable    ## Bicuspid Aortic Valve - stable    ## GERD  - Continue home omeprazole 20mg daily    Daily cares -   F:2 L/24hr fluid restriction  E:Repleat as necessary, close monitoring with diuresis/CKD5  N: Renal, low sodium, carb controlled diet  Lines:PIV  Activity:-Up as tolerated  CODE:Full Code  Prophylaxis: SC heparin / SCD for DVT ppx  PCP communication:  - Yahir Turcios  - Contacted at admission: No  - Concerns or updates: no  Dispo: Expected discharge date 2-3 days     Discussed with faculty but not examined by faculty.  Kenneth Gaston MD
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
## 2017-04-09 LAB
ANION GAP SERPL CALCULATED.3IONS-SCNC: 15 MMOL/L (ref 3–14)
ANION GAP SERPL CALCULATED.3IONS-SCNC: 16 MMOL/L (ref 3–14)
BASE DEFICIT BLDV-SCNC: 12.8 MMOL/L
BASOPHILS # BLD AUTO: 0.1 10E9/L (ref 0–0.2)
BASOPHILS NFR BLD AUTO: 0.8 %
BUN SERPL-MCNC: 111 MG/DL (ref 7–30)
BUN SERPL-MCNC: 113 MG/DL (ref 7–30)
CALCIUM SERPL-MCNC: 8.2 MG/DL (ref 8.5–10.1)
CALCIUM SERPL-MCNC: 8.4 MG/DL (ref 8.5–10.1)
CHLORIDE SERPL-SCNC: 112 MMOL/L (ref 94–109)
CHLORIDE SERPL-SCNC: 113 MMOL/L (ref 94–109)
CO2 SERPL-SCNC: 13 MMOL/L (ref 20–32)
CO2 SERPL-SCNC: 14 MMOL/L (ref 20–32)
CREAT SERPL-MCNC: 6.72 MG/DL (ref 0.66–1.25)
CREAT SERPL-MCNC: 6.86 MG/DL (ref 0.66–1.25)
DIFFERENTIAL METHOD BLD: ABNORMAL
EOSINOPHIL # BLD AUTO: 0.5 10E9/L (ref 0–0.7)
EOSINOPHIL NFR BLD AUTO: 5.8 %
ERYTHROCYTE [DISTWIDTH] IN BLOOD BY AUTOMATED COUNT: 16.3 % (ref 10–15)
GFR SERPL CREATININE-BSD FRML MDRD: 8 ML/MIN/1.7M2
GFR SERPL CREATININE-BSD FRML MDRD: 8 ML/MIN/1.7M2
GLUCOSE BLDC GLUCOMTR-MCNC: 124 MG/DL (ref 70–99)
GLUCOSE BLDC GLUCOMTR-MCNC: 171 MG/DL (ref 70–99)
GLUCOSE BLDC GLUCOMTR-MCNC: 171 MG/DL (ref 70–99)
GLUCOSE BLDC GLUCOMTR-MCNC: 176 MG/DL (ref 70–99)
GLUCOSE SERPL-MCNC: 121 MG/DL (ref 70–99)
GLUCOSE SERPL-MCNC: 142 MG/DL (ref 70–99)
HBA1C MFR BLD: 7.2 % (ref 4.3–6)
HCO3 BLDV-SCNC: 12 MMOL/L (ref 21–28)
HCT VFR BLD AUTO: 26.8 % (ref 40–53)
HGB BLD-MCNC: 8.4 G/DL (ref 13.3–17.7)
IMM GRANULOCYTES # BLD: 0.1 10E9/L (ref 0–0.4)
IMM GRANULOCYTES NFR BLD: 0.6 %
INTERPRETATION ECG - MUSE: NORMAL
LYMPHOCYTES # BLD AUTO: 0.7 10E9/L (ref 0.8–5.3)
LYMPHOCYTES NFR BLD AUTO: 8.2 %
MAGNESIUM SERPL-MCNC: 2.2 MG/DL (ref 1.6–2.3)
MAGNESIUM SERPL-MCNC: 2.4 MG/DL (ref 1.6–2.3)
MCH RBC QN AUTO: 28.1 PG (ref 26.5–33)
MCHC RBC AUTO-ENTMCNC: 31.3 G/DL (ref 31.5–36.5)
MCV RBC AUTO: 90 FL (ref 78–100)
MONOCYTES # BLD AUTO: 0.5 10E9/L (ref 0–1.3)
MONOCYTES NFR BLD AUTO: 6.1 %
NEUTROPHILS # BLD AUTO: 7 10E9/L (ref 1.6–8.3)
NEUTROPHILS NFR BLD AUTO: 78.5 %
NRBC # BLD AUTO: 0 10*3/UL
NRBC BLD AUTO-RTO: 0 /100
O2/TOTAL GAS SETTING VFR VENT: 21 %
PCO2 BLDV: 24 MM HG (ref 40–50)
PH BLDV: 7.32 PH (ref 7.32–7.43)
PHOSPHATE SERPL-MCNC: 5.1 MG/DL (ref 2.5–4.5)
PHOSPHATE SERPL-MCNC: 5.1 MG/DL (ref 2.5–4.5)
PLATELET # BLD AUTO: 452 10E9/L (ref 150–450)
PO2 BLDV: 56 MM HG (ref 25–47)
POTASSIUM SERPL-SCNC: 4.2 MMOL/L (ref 3.4–5.3)
POTASSIUM SERPL-SCNC: 4.6 MMOL/L (ref 3.4–5.3)
PTH-INTACT SERPL-MCNC: 110 PG/ML (ref 12–72)
RBC # BLD AUTO: 2.99 10E12/L (ref 4.4–5.9)
SODIUM SERPL-SCNC: 141 MMOL/L (ref 133–144)
SODIUM SERPL-SCNC: 142 MMOL/L (ref 133–144)
WBC # BLD AUTO: 8.9 10E9/L (ref 4–11)

## 2017-04-09 PROCEDURE — 21400006 ZZH R&B CCU INTERMEDIATE UMMC

## 2017-04-09 PROCEDURE — 80048 BASIC METABOLIC PNL TOTAL CA: CPT | Performed by: FAMILY MEDICINE

## 2017-04-09 PROCEDURE — 85025 COMPLETE CBC W/AUTO DIFF WBC: CPT | Performed by: FAMILY MEDICINE

## 2017-04-09 PROCEDURE — 00000146 ZZHCL STATISTIC GLUCOSE BY METER IP

## 2017-04-09 PROCEDURE — 25000132 ZZH RX MED GY IP 250 OP 250 PS 637: Performed by: FAMILY MEDICINE

## 2017-04-09 PROCEDURE — 83735 ASSAY OF MAGNESIUM: CPT | Performed by: FAMILY MEDICINE

## 2017-04-09 PROCEDURE — 80048 BASIC METABOLIC PNL TOTAL CA: CPT | Performed by: HOSPITALIST

## 2017-04-09 PROCEDURE — 83036 HEMOGLOBIN GLYCOSYLATED A1C: CPT | Performed by: FAMILY MEDICINE

## 2017-04-09 PROCEDURE — 36415 COLL VENOUS BLD VENIPUNCTURE: CPT | Performed by: FAMILY MEDICINE

## 2017-04-09 PROCEDURE — 83970 ASSAY OF PARATHORMONE: CPT | Performed by: HOSPITALIST

## 2017-04-09 PROCEDURE — 25000131 ZZH RX MED GY IP 250 OP 636 PS 637: Performed by: FAMILY MEDICINE

## 2017-04-09 PROCEDURE — 25000128 H RX IP 250 OP 636: Performed by: FAMILY MEDICINE

## 2017-04-09 PROCEDURE — 83735 ASSAY OF MAGNESIUM: CPT | Performed by: HOSPITALIST

## 2017-04-09 PROCEDURE — 84100 ASSAY OF PHOSPHORUS: CPT | Performed by: FAMILY MEDICINE

## 2017-04-09 PROCEDURE — 84100 ASSAY OF PHOSPHORUS: CPT | Performed by: HOSPITALIST

## 2017-04-09 PROCEDURE — 82306 VITAMIN D 25 HYDROXY: CPT | Performed by: HOSPITALIST

## 2017-04-09 PROCEDURE — 25000128 H RX IP 250 OP 636: Performed by: HOSPITALIST

## 2017-04-09 PROCEDURE — 36415 COLL VENOUS BLD VENIPUNCTURE: CPT | Performed by: HOSPITALIST

## 2017-04-09 PROCEDURE — 82803 BLOOD GASES ANY COMBINATION: CPT | Performed by: FAMILY MEDICINE

## 2017-04-09 RX ORDER — FUROSEMIDE 10 MG/ML
80 INJECTION INTRAMUSCULAR; INTRAVENOUS ONCE
Status: COMPLETED | OUTPATIENT
Start: 2017-04-09 | End: 2017-04-09

## 2017-04-09 RX ORDER — FUROSEMIDE 10 MG/ML
120 INJECTION INTRAMUSCULAR; INTRAVENOUS ONCE
Status: DISCONTINUED | OUTPATIENT
Start: 2017-04-09 | End: 2017-04-09

## 2017-04-09 RX ORDER — FUROSEMIDE 10 MG/ML
80 INJECTION INTRAMUSCULAR; INTRAVENOUS EVERY 6 HOURS
Status: COMPLETED | OUTPATIENT
Start: 2017-04-09 | End: 2017-04-10

## 2017-04-09 RX ORDER — FUROSEMIDE 10 MG/ML
120 INJECTION INTRAMUSCULAR; INTRAVENOUS ONCE
Status: COMPLETED | OUTPATIENT
Start: 2017-04-09 | End: 2017-04-09

## 2017-04-09 RX ORDER — SEVELAMER CARBONATE 800 MG/1
800 TABLET, FILM COATED ORAL
Status: DISCONTINUED | OUTPATIENT
Start: 2017-04-09 | End: 2017-04-10

## 2017-04-09 RX ADMIN — METOPROLOL SUCCINATE 100 MG: 100 TABLET, FILM COATED, EXTENDED RELEASE ORAL at 08:36

## 2017-04-09 RX ADMIN — DOCUSATE SODIUM 100 MG: 100 CAPSULE, LIQUID FILLED ORAL at 08:37

## 2017-04-09 RX ADMIN — ATORVASTATIN CALCIUM 40 MG: 40 TABLET, FILM COATED ORAL at 19:52

## 2017-04-09 RX ADMIN — HYDRALAZINE HYDROCHLORIDE 50 MG: 50 TABLET ORAL at 08:36

## 2017-04-09 RX ADMIN — HYDRALAZINE HYDROCHLORIDE 50 MG: 50 TABLET ORAL at 19:52

## 2017-04-09 RX ADMIN — FUROSEMIDE 120 MG: 10 INJECTION, SOLUTION INTRAVENOUS at 03:54

## 2017-04-09 RX ADMIN — FUROSEMIDE 80 MG: 10 INJECTION, SOLUTION INTRAVENOUS at 16:03

## 2017-04-09 RX ADMIN — FUROSEMIDE 80 MG: 10 INJECTION, SOLUTION INTRAVENOUS at 11:26

## 2017-04-09 RX ADMIN — HEPARIN SODIUM 5000 UNITS: 5000 INJECTION, SOLUTION INTRAVENOUS; SUBCUTANEOUS at 22:19

## 2017-04-09 RX ADMIN — SEVELAMER CARBONATE 800 MG: 800 TABLET, FILM COATED ORAL at 17:57

## 2017-04-09 RX ADMIN — FUROSEMIDE 80 MG: 10 INJECTION, SOLUTION INTRAVENOUS at 22:19

## 2017-04-09 RX ADMIN — HEPARIN SODIUM 5000 UNITS: 5000 INJECTION, SOLUTION INTRAVENOUS; SUBCUTANEOUS at 06:34

## 2017-04-09 RX ADMIN — OMEPRAZOLE 20 MG: 20 CAPSULE, DELAYED RELEASE ORAL at 08:36

## 2017-04-09 RX ADMIN — HEPARIN SODIUM 5000 UNITS: 5000 INJECTION, SOLUTION INTRAVENOUS; SUBCUTANEOUS at 14:28

## 2017-04-09 RX ADMIN — ASPIRIN 81 MG: 81 TABLET, COATED ORAL at 08:36

## 2017-04-09 RX ADMIN — HYDRALAZINE HYDROCHLORIDE 50 MG: 50 TABLET ORAL at 14:28

## 2017-04-09 RX ADMIN — ISOSORBIDE MONONITRATE 60 MG: 60 TABLET, EXTENDED RELEASE ORAL at 08:36

## 2017-04-09 RX ADMIN — INSULIN ASPART 1 UNITS: 100 INJECTION, SOLUTION INTRAVENOUS; SUBCUTANEOUS at 12:58

## 2017-04-09 RX ADMIN — LISINOPRIL 40 MG: 40 TABLET ORAL at 08:36

## 2017-04-09 RX ADMIN — INSULIN ASPART 1 UNITS: 100 INJECTION, SOLUTION INTRAVENOUS; SUBCUTANEOUS at 17:53

## 2017-04-09 RX ADMIN — AMLODIPINE BESYLATE 5 MG: 5 TABLET ORAL at 08:36

## 2017-04-09 ASSESSMENT — ENCOUNTER SYMPTOMS
COUGH: 0
PALPITATIONS: 0
FEVER: 0
BACK PAIN: 1
CONFUSION: 0
VOMITING: 0
SHORTNESS OF BREATH: 0
ABDOMINAL PAIN: 0
DIZZINESS: 0
HEADACHES: 0
CHILLS: 0
AGITATION: 0
NERVOUS/ANXIOUS: 0
NAUSEA: 0

## 2017-04-09 NOTE — PROGRESS NOTES
Admission    Diagnosis: SOB  Admitted from: [facility]  Via: cart, ambulance   Accompanied by: EMTs  Belongings: Placed in closet; valuables sent home with family, declined sending any items to security.  Admission Profile: Completed  Teaching: orientation to unit, call don't fall, use of console, meal times, visiting hours, when to call for the RN (angina/sob/dizzyness, etc.), and enforced importance of safety   Access: PIV-SL  Telemetry: Placed on patient  Height/Weight: Completed    Pt is stable.  ALOx4. AVSS. Appeared to be MEADE.

## 2017-04-09 NOTE — PLAN OF CARE
Problem: Goal Outcome Summary  Goal: Goal Outcome Summary  Pt a/ o x 4. Pt VSS on RA. Pt has PIV on the right arm, saline locked. Pt received 120 mg IV lasix this shift at about 0400, been voiding adequately 675 mL this shift. Pt on 2L fluid restriction currently at 500 mL since midnight. Pt denies any pain. Pt up independently. Pt tele reading NSR except had 23 beats in 10 seconds of v tach with -150, pt asymptomatic, MD aware. Pt lung sounds have crackles at the base bilateral lobes. Pt 0200 . Neph to consult this AM. Will continue to monitor w/ POC.

## 2017-04-09 NOTE — PROGRESS NOTES
[facility] - Inpatient daily progress note    Date of admission: 4/8/2017  Date of admission: 4/8/2017  Date of service: 4/9/2017.           Assessment and Plan:   Assessment:   Murray Nicholson is a 62 year old male with a significant past medical history of chronic kidney disease 5, congestive heart failure (systolic and diastolic), diabetes, hyperlipidemia, coronary artery disease, chronic anemia, and hypertension who presents with fluid overload and acute on chronic kidney injury.   Patient Active Problem List   Diagnosis     Esophageal reflux     Hypersomnia with sleep apnea     Allergic rhinitis     CHF (congestive heart failure) (H)     CKD (chronic kidney disease) stage 5, GFR less than 15 ml/min (H)     Hyperlipidemia LDL goal <100     Hypertension goal BP (blood pressure) < 130/80     Hypertensive cardiopathy     Tubular adenoma     Anemia of chronic disease     Bicuspid aortic valve     CAD (coronary artery disease)     (HFpEF) heart failure with preserved ejection fraction (H)     Anemia in chronic renal disease     Hypertension     Dyslipidemia     MGUS (monoclonal gammopathy of unknown significance)     Ascending aortic aneurysm (H)     Type 2 diabetes mellitus with diabetic chronic kidney disease (H)     Anemia, iron deficiency     Anemia in stage 5 chronic kidney disease (H)     Fluid overload      Plan:   ## Fluid overload:   ## Combined systolic and diastolic CHF, decompensated, EF40-45% 11/16  Fluid overload most likely secondary to patient not taking lasix for last 2 weeks, cardiac dysfunction, and renal failure.  Patient received MARIANA 11/2016 that showed severe LV dilation and reduced systolic function.  Patient received 40 mg, 80 mg and then 120 mg IV lasix last night with approximately 1.6L urine output.  Will continue aggressive diuresis.   - Strict I/Os with goal 2L net negative  - 2 L/24hr fluid restriction  - 80 mg IV lasix now  - Consider bumex drip  - TTE     ## Acute on  chronic kidney injury: stable  No indication for emergent dialysis at this time; however, with fluid overload and stage 5 CKD, will consult nephrology for further recommendations at this time  - Monitor renal function and electrolytes  - Start Renvela (phosphate binder)  - Renal, low sodium diet    ## Non-Anion Gap Metabolic Acidosis:  stable  Likely related to hyperchloremia in setting of acute on chronic kidney injury  - Will monitor BMP and draw blood gas if needed     ## Radicular Lumbago  No current symptoms, no midline tenderness or reproducible symptoms at present; no evidence of trauma, fracture, or cauda equina.  History is suggestive of sciatica.  - F/U with PCP for further evaluation as necessary       Chronic medical conditions:     ## DM2  - Last HbA1c 6.3 11/16, will recheck in AM  Home Regimen: Glipizide 5mg PO daily  Hospital Regimen: Medium intensity ISS with glucose monitoring AC, QHS, and 2am     ## Chronic anemia  - Hold Iron supplementation for now  - Continue home darbepoetin emily Q14 day(last dose 4/4/17)     ## Hypertension  ## CAD - single vessel disease (LAD 60-70% focal stenosis on cardiac cath 1/17)  ## Hyperlipidemia  - Continue home atorvastatin 40mg qhs  - Continue home Metoprolol XR 100mg q24h  - Continue home hydralazine 50mg daily  - Continue home Imdur 60mg q24h  - Continue home lisinopril 40 mg daily     ## Ascending aortic aneurysm - stable     ## Bicuspid Aortic Valve - stable     ## GERD  - Continue home omeprazole 20mg daily     Daily cares -   F:2 L/24hr fluid restriction  E:Repleat as necessary, close monitoring with diuresis/CKD5  N: Renal, low sodium, carb controlled diet  Lines:PIV  Activity:-Up as tolerated  CODE:Full Code  Prophylaxis: SC heparin / SCD for DVT ppx  PCP communication:  - Yahir Turcios  - Contacted at admission: No  - Concerns or updates: no  Dispo: Expected discharge date 2-3 days              Interval History:   Murray Nicholson slept well last night. He  has no complaints about his breathing this morning, but states he isn't sure how it is because it only bothers him when he ambulates.  No CP, no N/V.    Patient received 40 mg IV lasix in ED, then 80 mg IV Lasix on floor, followed by 120 mg IV lasix on floor.  Total UO on floor has been 1.6L.    Patient also noted to have 2 runs of VT overnight, each with approximately 11-12 beats            Review of Systems:   Review of Systems   Constitutional: Negative for chills and fever.   HENT: Negative for congestion.    Respiratory: Negative for cough and shortness of breath (only with ambulation).    Cardiovascular: Positive for leg swelling. Negative for chest pain and palpitations.   Gastrointestinal: Negative for abdominal pain, nausea and vomiting.   Musculoskeletal: Positive for back pain.   Neurological: Negative for dizziness and headaches.   Psychiatric/Behavioral: Negative for agitation and confusion. The patient is not nervous/anxious.             Physical Exam (Resident / Clinician):   Vitals were reviewed  Temp: 98.1  F (36.7  C) Temp src: Oral BP: 134/74 Pulse: 91 Heart Rate: 93 Resp: 17 SpO2: 96 % O2 Device: None (Room air)      Physical Exam   Constitutional: He is oriented to person, place, and time. He appears well-developed. No distress.   HENT:   Head: Normocephalic.   Eyes: Conjunctivae are normal.   Neck: Normal range of motion. Neck supple.   Cardiovascular: Normal rate and regular rhythm.    No murmur heard.  Pulmonary/Chest: Effort normal and breath sounds normal. No respiratory distress. He has no wheezes. He has no rales.   Mild crackles in bases bilaterally   Abdominal: Soft. He exhibits no distension. There is no tenderness.   Musculoskeletal: Normal range of motion. He exhibits edema (1+ lower extremity edema bilaterally). He exhibits no tenderness.   Neurological: He is alert and oriented to person, place, and time.   Skin: Skin is warm and dry.   Psychiatric: He has a normal mood and affect.  His behavior is normal. Judgment and thought content normal.   Vitals reviewed.      Intake/Output Summary (Last 24 hours) at 04/09/17 0928  Last data filed at 04/09/17 0800   Gross per 24 hour   Intake              930 ml   Output             1490 ml   Net             -560 ml           Data:   ROUTINE LABS (Last four results)  CMP    Recent Labs  Lab 04/09/17  0602 04/08/17  1515    142   POTASSIUM 4.6 4.8   CHLORIDE 113* 116*   CO2 13* 13*   ANIONGAP 15* 13   * 165*   * 115*   CR 6.86* 6.98*   GFRESTIMATED 8* 8*   GFRESTBLACK 10* 10*   TRINI 8.4* 8.9   MAG 2.4* 2.2   PHOS 5.1* 4.9*   PROTTOTAL  --  6.9   ALBUMIN  --  2.3*   BILITOTAL  --  0.4   ALKPHOS  --  92   AST  --  13   ALT  --  18     CBC    Recent Labs  Lab 04/09/17  0602 04/08/17  1515 04/04/17  1303   WBC 8.9 8.2  --    RBC 2.99* 3.02*  --    HGB 8.4* 8.3* 8.3*   HCT 26.8* 26.9* 27.0*   MCV 90 89  --    MCH 28.1 27.5  --    MCHC 31.3* 30.9*  --    RDW 16.3* 15.9*  --    * 494*  --      INRNo lab results found in last 7 days.  CRPNo lab results found in last 7 days.      Blood culture:  Invalid input(s): BC   Urine culture:  No results for input(s): URC in the last 168 hours.  All cultures:  No results for input(s): CULT in the last 168 hours.        Medications:     Current Facility-Administered Medications   Medication     amLODIPine (NORVASC) tablet 5 mg     aspirin EC EC tablet 81 mg     atorvastatin (LIPITOR) tablet 40 mg     hydrALAZINE (APRESOLINE) tablet 50 mg     isosorbide mononitrate (IMDUR) 24 hr tablet 60 mg     lisinopril (PRINIVIL/ZESTRIL) tablet 40 mg     loratadine (CLARITIN) tablet 10 mg     metoprolol (TOPROL-XL) 24 hr tablet 100 mg     omeprazole (priLOSEC) CR capsule 20 mg     heparin sodium PF injection 5,000 Units     acetaminophen (TYLENOL) tablet 650 mg     docusate sodium (COLACE) capsule 100 mg     ondansetron (ZOFRAN-ODT) ODT tab 4 mg    Or     ondansetron (ZOFRAN) injection 4 mg     glucose 40 % gel  15-30 g    Or     dextrose 50 % injection 25-50 mL    Or     glucagon injection 1 mg     insulin aspart (NovoLOG) inj (RAPID ACTING)     insulin aspart (NovoLOG) inj (RAPID ACTING)     guaiFENesin (ROBITUSSIN) 20 mg/mL solution 10 mL     Facility-Administered Medications Ordered in Other Encounters   Medication     bupivacaine 0.25 % - EPINEPHrine 1:200,000 injection       Caring Physician: Matilda Torres MD  [facility], [facility]  Pager Contact: see Physician sticky note

## 2017-04-10 ENCOUNTER — APPOINTMENT (OUTPATIENT)
Dept: CARDIOLOGY | Facility: CLINIC | Age: 62
DRG: 291 | End: 2017-04-10
Attending: HOSPITALIST
Payer: COMMERCIAL

## 2017-04-10 LAB
ANION GAP SERPL CALCULATED.3IONS-SCNC: 15 MMOL/L (ref 3–14)
ANION GAP SERPL CALCULATED.3IONS-SCNC: 16 MMOL/L (ref 3–14)
BUN SERPL-MCNC: 110 MG/DL (ref 7–30)
BUN SERPL-MCNC: 112 MG/DL (ref 7–30)
CALCIUM SERPL-MCNC: 8.7 MG/DL (ref 8.5–10.1)
CALCIUM SERPL-MCNC: 8.8 MG/DL (ref 8.5–10.1)
CHLORIDE SERPL-SCNC: 111 MMOL/L (ref 94–109)
CHLORIDE SERPL-SCNC: 111 MMOL/L (ref 94–109)
CO2 SERPL-SCNC: 14 MMOL/L (ref 20–32)
CO2 SERPL-SCNC: 16 MMOL/L (ref 20–32)
CREAT SERPL-MCNC: 6.57 MG/DL (ref 0.66–1.25)
CREAT SERPL-MCNC: 6.7 MG/DL (ref 0.66–1.25)
DEPRECATED CALCIDIOL+CALCIFEROL SERPL-MC: 17 UG/L (ref 20–75)
GFR SERPL CREATININE-BSD FRML MDRD: 8 ML/MIN/1.7M2
GFR SERPL CREATININE-BSD FRML MDRD: 9 ML/MIN/1.7M2
GLUCOSE BLDC GLUCOMTR-MCNC: 120 MG/DL (ref 70–99)
GLUCOSE BLDC GLUCOMTR-MCNC: 132 MG/DL (ref 70–99)
GLUCOSE BLDC GLUCOMTR-MCNC: 137 MG/DL (ref 70–99)
GLUCOSE BLDC GLUCOMTR-MCNC: 200 MG/DL (ref 70–99)
GLUCOSE SERPL-MCNC: 122 MG/DL (ref 70–99)
GLUCOSE SERPL-MCNC: 138 MG/DL (ref 70–99)
MAGNESIUM SERPL-MCNC: 2.2 MG/DL (ref 1.6–2.3)
MAGNESIUM SERPL-MCNC: 2.2 MG/DL (ref 1.6–2.3)
PHOSPHATE SERPL-MCNC: 5 MG/DL (ref 2.5–4.5)
PHOSPHATE SERPL-MCNC: 5.2 MG/DL (ref 2.5–4.5)
POTASSIUM SERPL-SCNC: 3.9 MMOL/L (ref 3.4–5.3)
POTASSIUM SERPL-SCNC: 4 MMOL/L (ref 3.4–5.3)
SODIUM SERPL-SCNC: 140 MMOL/L (ref 133–144)
SODIUM SERPL-SCNC: 141 MMOL/L (ref 133–144)

## 2017-04-10 PROCEDURE — 25000128 H RX IP 250 OP 636: Performed by: FAMILY MEDICINE

## 2017-04-10 PROCEDURE — 99232 SBSQ HOSP IP/OBS MODERATE 35: CPT | Mod: 25 | Performed by: INTERNAL MEDICINE

## 2017-04-10 PROCEDURE — 93306 TTE W/DOPPLER COMPLETE: CPT

## 2017-04-10 PROCEDURE — 36415 COLL VENOUS BLD VENIPUNCTURE: CPT | Performed by: HOSPITALIST

## 2017-04-10 PROCEDURE — 21400006 ZZH R&B CCU INTERMEDIATE UMMC

## 2017-04-10 PROCEDURE — 83735 ASSAY OF MAGNESIUM: CPT | Performed by: HOSPITALIST

## 2017-04-10 PROCEDURE — 25000132 ZZH RX MED GY IP 250 OP 250 PS 637: Performed by: FAMILY MEDICINE

## 2017-04-10 PROCEDURE — 00000146 ZZHCL STATISTIC GLUCOSE BY METER IP

## 2017-04-10 PROCEDURE — 25000132 ZZH RX MED GY IP 250 OP 250 PS 637

## 2017-04-10 PROCEDURE — 84100 ASSAY OF PHOSPHORUS: CPT | Performed by: HOSPITALIST

## 2017-04-10 PROCEDURE — 93306 TTE W/DOPPLER COMPLETE: CPT | Mod: 26 | Performed by: INTERNAL MEDICINE

## 2017-04-10 PROCEDURE — 80048 BASIC METABOLIC PNL TOTAL CA: CPT | Performed by: HOSPITALIST

## 2017-04-10 RX ORDER — AMOXICILLIN 250 MG
2 CAPSULE ORAL AT BEDTIME
Status: DISCONTINUED | OUTPATIENT
Start: 2017-04-10 | End: 2017-04-13 | Stop reason: HOSPADM

## 2017-04-10 RX ORDER — SODIUM BICARBONATE 650 MG/1
1300 TABLET ORAL 3 TIMES DAILY
Status: DISCONTINUED | OUTPATIENT
Start: 2017-04-10 | End: 2017-04-13 | Stop reason: HOSPADM

## 2017-04-10 RX ORDER — FUROSEMIDE 80 MG
80 TABLET ORAL
Status: DISCONTINUED | OUTPATIENT
Start: 2017-04-11 | End: 2017-04-13 | Stop reason: HOSPADM

## 2017-04-10 RX ORDER — FUROSEMIDE 80 MG
80 TABLET ORAL
Status: DISCONTINUED | OUTPATIENT
Start: 2017-04-10 | End: 2017-04-10

## 2017-04-10 RX ORDER — AMLODIPINE BESYLATE 5 MG/1
5 TABLET ORAL AT BEDTIME
Status: DISCONTINUED | OUTPATIENT
Start: 2017-04-11 | End: 2017-04-13 | Stop reason: HOSPADM

## 2017-04-10 RX ORDER — FUROSEMIDE 10 MG/ML
80 INJECTION INTRAMUSCULAR; INTRAVENOUS ONCE
Status: COMPLETED | OUTPATIENT
Start: 2017-04-10 | End: 2017-04-10

## 2017-04-10 RX ORDER — POLYETHYLENE GLYCOL 3350 17 G/17G
17 POWDER, FOR SOLUTION ORAL DAILY PRN
Status: DISCONTINUED | OUTPATIENT
Start: 2017-04-10 | End: 2017-04-13 | Stop reason: HOSPADM

## 2017-04-10 RX ADMIN — SODIUM BICARBONATE 650 MG TABLET 1300 MG: at 14:39

## 2017-04-10 RX ADMIN — FUROSEMIDE 80 MG: 10 INJECTION, SOLUTION INTRAVENOUS at 05:39

## 2017-04-10 RX ADMIN — ISOSORBIDE MONONITRATE 60 MG: 60 TABLET, EXTENDED RELEASE ORAL at 07:53

## 2017-04-10 RX ADMIN — HYDRALAZINE HYDROCHLORIDE 50 MG: 50 TABLET ORAL at 14:39

## 2017-04-10 RX ADMIN — SENNOSIDES AND DOCUSATE SODIUM 2 TABLET: 8.6; 5 TABLET ORAL at 20:06

## 2017-04-10 RX ADMIN — LISINOPRIL 40 MG: 40 TABLET ORAL at 07:54

## 2017-04-10 RX ADMIN — AMLODIPINE BESYLATE 5 MG: 5 TABLET ORAL at 07:55

## 2017-04-10 RX ADMIN — HYDRALAZINE HYDROCHLORIDE 50 MG: 50 TABLET ORAL at 07:54

## 2017-04-10 RX ADMIN — HEPARIN SODIUM 5000 UNITS: 5000 INJECTION, SOLUTION INTRAVENOUS; SUBCUTANEOUS at 07:57

## 2017-04-10 RX ADMIN — DOCUSATE SODIUM 100 MG: 100 CAPSULE, LIQUID FILLED ORAL at 07:55

## 2017-04-10 RX ADMIN — HEPARIN SODIUM 5000 UNITS: 5000 INJECTION, SOLUTION INTRAVENOUS; SUBCUTANEOUS at 16:11

## 2017-04-10 RX ADMIN — DOCUSATE SODIUM 100 MG: 100 CAPSULE, LIQUID FILLED ORAL at 19:43

## 2017-04-10 RX ADMIN — ACETAMINOPHEN 650 MG: 325 TABLET, FILM COATED ORAL at 22:55

## 2017-04-10 RX ADMIN — HEPARIN SODIUM 5000 UNITS: 5000 INJECTION, SOLUTION INTRAVENOUS; SUBCUTANEOUS at 21:44

## 2017-04-10 RX ADMIN — ACETAMINOPHEN 650 MG: 325 TABLET, FILM COATED ORAL at 10:23

## 2017-04-10 RX ADMIN — FUROSEMIDE 80 MG: 10 INJECTION, SOLUTION INTRAVENOUS at 14:39

## 2017-04-10 RX ADMIN — METOPROLOL SUCCINATE 100 MG: 100 TABLET, FILM COATED, EXTENDED RELEASE ORAL at 07:55

## 2017-04-10 RX ADMIN — INSULIN ASPART 1 UNITS: 100 INJECTION, SOLUTION INTRAVENOUS; SUBCUTANEOUS at 21:44

## 2017-04-10 RX ADMIN — VITAMIN D, TAB 1000IU (100/BT) 5000 UNITS: 25 TAB at 07:56

## 2017-04-10 RX ADMIN — ASPIRIN 81 MG: 81 TABLET, COATED ORAL at 07:54

## 2017-04-10 RX ADMIN — HYDRALAZINE HYDROCHLORIDE 50 MG: 50 TABLET ORAL at 19:43

## 2017-04-10 RX ADMIN — OMEPRAZOLE 20 MG: 20 CAPSULE, DELAYED RELEASE ORAL at 07:55

## 2017-04-10 RX ADMIN — SODIUM BICARBONATE 650 MG TABLET 1300 MG: at 19:43

## 2017-04-10 RX ADMIN — ATORVASTATIN CALCIUM 40 MG: 40 TABLET, FILM COATED ORAL at 19:43

## 2017-04-10 ASSESSMENT — ENCOUNTER SYMPTOMS
HEADACHES: 0
FEVER: 0
SHORTNESS OF BREATH: 0
COUGH: 0
DIZZINESS: 0
NAUSEA: 0
PALPITATIONS: 0
CONFUSION: 0
AGITATION: 0
ABDOMINAL PAIN: 0
NERVOUS/ANXIOUS: 0
BACK PAIN: 1
VOMITING: 0
CHILLS: 0

## 2017-04-10 NOTE — CONSULTS
Dialysis Access Service  Consult Note    I was asked to see a patient regarding possibility of PD catheter insertion during his admission to the hospital.    CKD 2nd to DM. Seen by Dr Arvizu Jan 2017 and assessed for PD insertion. Currently admitted with fluid overload and pulmonary edema. ECHO done today showed worsening of the heart failure and EF = 20%.    PSH: Laparoscopic bilateral inguinal hernia repair    Risk factors for complications of PD catheter access are:     Hx of abdominal surgery  []    Comment:     as above  Hx of  hernia repair/hydrocele []        History of previous PD catheter []    Comment:     Respiratory problems   [x]    Comment:   Pulmonary edema  Obesity    [x]      Diabetes   [x]         Anticoagulation:   []    Agent:        Aspirin 81mg                              Current immunosuppression []   Comment:                                  PHYSICAL EXAM:  Temp:  [97.7  F (36.5  C)-98.3  F (36.8  C)] 97.8  F (36.6  C)  Heart Rate:  [78-86] 78  Resp:  [16-18] 16  BP: (128-137)/(76-82) 130/79  SpO2:  [97 %-100 %] 100 %  alert, mild distress and cooperative  Abdominal Exam: abdomen soft, distended not tender, no masses felt, mild ascitis      Assessment & Plan: Mr. Nicholson is a good candidate for peritoneal  dialysis access however currently he is not fit for laparoscopic PD catheter placement under general anaesthesia. When he is medically optimized then he can be scheduled for a procedure as initially planned. He may require bridging haemodialysis until then.    The plan was discussed with the primary team looking after Mr Ramires during this admission.    Thank you for the opportunity to participate in Mr. Nicholson's care.    TT: 35 min, CT: 25 min.    Jennifer Huffman  Transplant Fellow  #[id number]  I have reviewed history, examined patient and discussed plan with the fellow/resident/EARL.  I concur with the findings in this note.

## 2017-04-10 NOTE — PLAN OF CARE
Problem: Goal Outcome Summary  Goal: Goal Outcome Summary  Pt A/O. VSS. RA. Denies pain. Up in halls with visitor; SB assist. BG assessed and managed with SSI. Total 160mg IV push Lasix given this shift; UOP 1425cc [id number]; voiding in urinal. Electrolytes due to be checked at 2000 tonight. Continue with plan of care and report changes to [facility] team.

## 2017-04-10 NOTE — CONSULTS
Brief Social Work Consult Note:    Consult Ordered:   Sw consult was ordered for financial/insurance issues, healthcare directive.    Outcome:   SW received consult and attempted to meet with pt.  Pt was sleeping soundly.  SW will attempt to meet with pt again later today or tomorrow morning as able.    SW will clear consult orders from chart.    NIRAV Tyler, APSW  6C Unit   Pager: [phone]  Phone: [phone]

## 2017-04-10 NOTE — PROGRESS NOTES
Nephrology Progress Note  04/10/2017       Murray Nicholson is a 62 year old year old male admitted on 4/8/17 with dyspnea/cough and found to have volume overload/Heart failure exacerbation with worsening renal function. PMH includes Aortic aneurysm, bicuspid aortic valve, Systolic heart failure, CKD 4/5, T2DM    Assessment & Recommendations:     1. CKD4/5 - Etiology Diabetic Nephropathy. Followed by Dr Brice Caraballo. Baseline creat ~4-5. Has been in the 6's since admission in setting of Severe cardiomyopathy/HF exacerbation.   - RRT of choice is PD. Dr Donovan did d/w Transplant surgery today in hopes of having PD catheter placed this admission to facilitate rapid PD start given worsening renal function with some preserved kidney function.   - We would like to avoid hemodialysis    - Pt has no acute need for hemodialysis at this time   - Continue diuresis   - Avoid nephrotoxins, hypotension   - Renal dose medications   - Continue daily chemistries    2. Volume status - Hypervolemia with edema, dilated IVC on Echo today, CXR with pulmonary edema. Not on supplemental oxygen. -130/. UO 2300 cc last 24 hrs. Weight was 88.9 kg on 2/15/17. Today weight is 86.9 kg.    - Continue diuresis as tolerated   - Have added back his home regimen of po Furosemide, may want to add Metolazone 2.5 mg qd   - Primary team added Furosemide 80 mg IV x 1 today    3. Systolic heart failure/aortic stenosis/aortic aneurysm -    - Echo today showing worsening of heart failure with EF of 20%   - Current med regimen includes: Hydralazine, Imdur, Lisinopril, Toprol, diuretic   - May want to consider Cardiology consult    4. HTN - Well controlled at this time and improved from last nephrology clinic visit with SBP in the 150/ range. PTA regimen includes: Lasix 80 mg bid, Norvasc 5 mg bid, Hydralaxine 50 mg TID, Lisinopril 40 mg bid, Metoprolol 100 mg qd, Imdur 60 mg qd    5. Electrolytes - No acute concerns.     6. Acid base - Acidotic, Bicarb  "14, in setting of GFR of 8 ml/mn.    - Begin Na Bicarb 1300 mg TID    7. BMD - Ca 8.8, Phos 5.0   - Continue Renvela 800 mg TID   - Restart cholecalciferol 5000 U qd ( will check a level)    8. Anemia - Hgb 8.4, down from 10.2 on 3/15/17. No active signs of bleeding. Likely dilutional given hypervolemia.    - Continue Aranesp 60 meq q 14 dys. Last dose 4/4/17. Next dose due 4/18/17.    - Recheck iron studies. They were low in March ( I have ordered)    Recommendations were communicated to primary team via progress note    Seen and discussed with Dr. Venus Cook, NP   [phone]    Interval History :   Patient reports that he had gradually run out of all of his medications prior to admission being completely out of all of them by the week prior to admission.   He notes increased dyspnea and cough for one week PTA.   Patient notes increasing fatigue, general malaise PTA  RRT choice has been PD. He does not yet have a PD catheter.     Review of Systems:     I reviewed the following systems:  GI: No nausea/vomiting.   Neuro:  no confusion  Constitutional:  No fever or chills  CV: breathing has improved and edema. Ongoing non productive cough. No chest pain.  ; Voiding w/o doyle    Physical Exam:   I/O last 3 completed shifts:  In: 960 [P.O.:960]  Out: 2985 [Urine:2985]   /76 (BP Location: Left arm)  Pulse 91  Temp 98.2  F (36.8  C) (Oral)  Resp 16  Ht 1.753 m (5' 9\")  Wt 86.9 kg (191 lb 9.6 oz)  SpO2 99%  BMI 28.29 kg/m2     GENERAL APPEARANCE: alert, interactive, NAD  EYES:  no scleral icterus, pupils equal  PULM: lungs CTA. Breathing is non labored. Not on supplemental oxygen.   CV: mild tachycardia      -edema - 1+ LE edema  GI: soft, NT, Non distended.   INTEGUMENT: no cyanosis or rash  NEURO:  Alert, oriented    Labs:   All labs reviewed by me  Electrolytes/Renal -   Recent Labs   Lab Test  04/10/17   0759  04/09/17   1949  04/09/17   0602   NA  140  142  141   POTASSIUM  4.0  4.2  " 4.6   CHLORIDE  111*  112*  113*   CO2  14*  14*  13*   BUN  110*  113*  111*   CR  6.70*  6.72*  6.86*   GLC  122*  142*  121*   TRINI  8.8  8.2*  8.4*   MAG  2.2  2.2  2.4*   PHOS  5.0*  5.1*  5.1*       CBC -   Recent Labs   Lab Test  04/09/17   0602  04/08/17   1515  04/04/17   1303   03/15/17   1355   WBC  8.9  8.2   --    --   5.0   HGB  8.4*  8.3*  8.3*   < >  10.2*   PLT  452*  494*   --    --   238    < > = values in this interval not displayed.       LFTs -   Recent Labs   Lab Test  04/08/17   1515  03/15/17   1355  01/04/17   1005   04/14/16   0958   08/10/15   0729   ALKPHOS  92   --    --    --   131   --   127   BILITOTAL  0.4   --    --    --   0.5   --   0.6   ALT  18   --    --    --   18   --   29   AST  13   --    --    --   14   --   29   PROTTOTAL  6.9   --    --    --   6.9   --   7.2   ALBUMIN  2.3*  3.2*  3.0*   < >  3.0*   < >  3.4    < > = values in this interval not displayed.       Iron Panel -   Recent Labs   Lab Test  03/15/17   1355  02/15/17   1023  01/04/17   1005   IRON  30*  28*  43   IRONSAT  13*  13*  18   ALBERTINA  860*  432*  663*       CHEST TWO VIEW 4/8/2017 3:57 PM      HISTORY: SOA, renal failure.     COMPARISON: None.     FINDINGS: Cardiomegaly. Extensive perihilar interstitial and alveolar  opacities increased from 12/14/2016. This has the appearance of  pulmonary edema.         IMPRESSION: Extensive bilateral opacities suggest a pattern of  pulmonary edema. Cardiomegaly.    Interpretation Summary  Left ventricular systolic function is severely reduced with ejection fraction  estimated at 20-25% with severe global hypokinesis. The left ventricle is  severely dilated (LVIDd 7.60cm) with normal thickness.  Global right ventricular function is normal. Mild right ventricular dilation  is present.  The aortic valve is functionally bicuspid with fusion of the left and right  coronary cusps with sclerosis. There is mild to moderate eccentric aortic  insufficiency that is directed  posteriorly.  The aortic root is dilated at the sinus of valsalva(4.6cm). There is a  moderate-sized aneurym involving the proximal ascending aorta (4.6cm).  Dilation of the inferior vena cava is present with normal respiratory  variation in diameter.  Compared to the previous study dated 2/2016, there has been interval reduction  in left ventricular systolic function.    Current Medications:    cholecalciferol  5,000 Units Oral Daily     furosemide  80 mg Oral BID     sodium bicarbonate  1,300 mg Oral TID     amLODIPine  5 mg Oral Daily     atorvastatin  40 mg Oral QPM     hydrALAZINE  50 mg Oral TID     isosorbide mononitrate  60 mg Oral Daily     lisinopril  40 mg Oral Daily     metoprolol  100 mg Oral Daily     omeprazole  20 mg Oral Daily     heparin  5,000 Units Subcutaneous Q8H     docusate sodium  100 mg Oral BID     insulin aspart  1-7 Units Subcutaneous TID AC     insulin aspart  1-5 Units Subcutaneous At Bedtime        Patricia Cook, NP

## 2017-04-10 NOTE — PROGRESS NOTES
D: Admitted for SOB- heart failure, fluid up, here to [place]. Stage 5 CKD    I: Monitored vitals and assessed pt  Changed: n/a  Running: n/a  PRN: tylenol for headache    A: VSS. AOx3. Room air. Independent. Right peripheral IV saline locked. On 2L fluid restriction. 80 mg IV Lasix given at 1400. No arrhythmias noted during this shift. Insulin not given, BG order parameters not met. Showered this a.m. Voided 875 cc's this shift. Started on sodium bicarb, may need dialysis?    P: Continue to monitor pt and report changes to treatment team    Coty Anderson, student nurse

## 2017-04-10 NOTE — PROGRESS NOTES
[facility] - Inpatient daily progress note    Date of admission: 4/8/2017  Date of admission: 4/8/2017  Date of service: 4/10/2017.    Updates:  - Cardiology consult today  - Continue diuresis           Assessment and Plan:   Assessment:   Murray Nicholson is a 62 year old male with a significant past medical history of chronic kidney disease 5, congestive heart failure (systolic and diastolic), diabetes, hyperlipidemia, coronary artery disease, chronic anemia, and hypertension who presented with fluid overload and acute on chronic kidney injury.   Patient Active Problem List   Diagnosis     Esophageal reflux     Hypersomnia with sleep apnea     Allergic rhinitis     CHF (congestive heart failure) (H)     CKD (chronic kidney disease) stage 5, GFR less than 15 ml/min (H)     Hyperlipidemia LDL goal <100     Hypertension goal BP (blood pressure) < 130/80     Hypertensive cardiopathy     Tubular adenoma     Anemia of chronic disease     Bicuspid aortic valve     CAD (coronary artery disease)     (HFpEF) heart failure with preserved ejection fraction (H)     Anemia in chronic renal disease     Hypertension     Dyslipidemia     MGUS (monoclonal gammopathy of unknown significance)     Ascending aortic aneurysm (H)     Type 2 diabetes mellitus with diabetic chronic kidney disease (H)     Anemia, iron deficiency     Anemia in stage 5 chronic kidney disease (H)     Fluid overload      Plan:   ## Fluid overload:   ## Combined systolic and diastolic CHF, decompensated, EF40-45% 11/16  Fluid overload most likely secondary to patient not taking lasix for last 2 weeks, cardiac dysfunction, and renal failure.  Patient has received multiple doses of IV lasix since admission with a net negative output of 2 liters since admission until today.   Will continue aggressive diuresis.  TTE performed today showed significantly reduced EF.  Most likely from volume overload, but will consult Cardiology for further  recommendations.  - Strict I/Os with goal 1L net negative on a daily basis  - 2 L/24hr fluid restriction  - Continue IV lasix boluses until patient is euvolemic  - Restart home lasix tomorrow  - Cardiology consult     ## Acute on chronic kidney injury: stable  No indication for emergent dialysis at this time; however, with fluid overload and stage 5 CKD,  nephrology was consulted for further recommendations.    - Nephrology following, appreciate recs  - Continue outpatient HD/transplant workup  - Monitor renal function and electrolytes  - Continue Renvela (phosphate binder)  - Renal, low sodium diet    ## Non-Anion Gap Metabolic Acidosis:  stable  Likely related to hyperchloremia in setting of acute on chronic kidney injury  - Will monitor BMP and draw blood gas if needed     ## Radicular Lumbago  ## Groin pain  Patient has a history of low back pain with groin and leg pain that has occurred in past.  Discomfort described today seems musculoskeletal in nature, but could also be pain referred from the hip.  No current symptoms, no midline tenderness or reproducible symptoms at present; no evidence of trauma, fracture, or cauda equina.   - F/U with PCP for further evaluation as necessary       Chronic medical conditions:     ## DM2  - Last HbA1c 6.3 11/16, will recheck in AM  Home Regimen: Glipizide 5mg PO daily  Hospital Regimen: Medium intensity ISS with glucose monitoring AC, QHS, and 2am     ## Chronic anemia  - Hold Iron supplementation for now  - Continue home darbepoetin emily Q14 day(last dose 4/4/17)     ## Hypertension  ## CAD - single vessel disease (LAD 60-70% focal stenosis on cardiac cath 1/17)  ## Hyperlipidemia  - Continue home atorvastatin 40mg qhs  - Continue home Metoprolol XR 100mg q24h  - Continue home hydralazine 50mg daily  - Continue home Imdur 60mg q24h  - Continue home lisinopril 40 mg daily     ## Ascending aortic aneurysm - stable     ## Bicuspid Aortic Valve - stable     ## GERD  - Continue  home omeprazole 20mg daily     Daily cares -   F:2 L/24hr fluid restriction  E:Repleat as necessary, close monitoring with diuresis/CKD5  N: Renal, low sodium, carb controlled diet  Lines:PIV  Activity:-Up as tolerated  CODE:Full Code  Prophylaxis: SC heparin / SCD for DVT ppx  PCP communication:  - Yahir Turcios  - Contacted at admission: No  - Concerns or updates: no  Dispo: Expected discharge date 2-3 days              Interval History:   Murray Nicholson has no complaints except some pain in inguinal area of both legs.  He states it has been going on for several weeks, worse with certain positions in bed.    No SOB, no chest pain, no abdominal pain.             Review of Systems:   Review of Systems   Constitutional: Negative for chills and fever.   HENT: Negative for congestion.    Respiratory: Negative for cough and shortness of breath (only with ambulation).    Cardiovascular: Positive for leg swelling. Negative for chest pain and palpitations.   Gastrointestinal: Negative for abdominal pain, nausea and vomiting.   Musculoskeletal: Positive for back pain.        Pain of both legs, just below inguinal area.  Left > right.   Neurological: Negative for dizziness and headaches.   Psychiatric/Behavioral: Negative for agitation and confusion. The patient is not nervous/anxious.             Physical Exam (Resident / Clinician):   Vitals were reviewed  Temp: 98  F (36.7  C) Temp src: Oral BP: 128/80   Heart Rate: 86 Resp: 18 SpO2: 98 % O2 Device: None (Room air)      Physical Exam   Constitutional: He is oriented to person, place, and time. He appears well-developed. No distress.   HENT:   Head: Normocephalic.   Eyes: Conjunctivae are normal.   Neck: Normal range of motion. Neck supple.   Cardiovascular: Normal rate and regular rhythm.    No murmur heard.  Pulmonary/Chest: Effort normal and breath sounds normal. No respiratory distress. He has no wheezes. He has no rales.   Mild crackles in bases bilaterally    Abdominal: Soft. He exhibits no distension. There is no tenderness.   Musculoskeletal: Normal range of motion. He exhibits edema (1+ lower extremity edema bilaterally). He exhibits no tenderness.   Mild tenderness to palpation of right leg below inguinal area.  Normal hip flexion and extension, internal and external rotation.   Neurological: He is alert and oriented to person, place, and time.   Skin: Skin is warm and dry.   Psychiatric: He has a normal mood and affect. His behavior is normal. Judgment and thought content normal.   Vitals reviewed.      Intake/Output Summary (Last 24 hours) at 04/09/17    Gross per 24 hour   Intake              1830 ml   Output             2300 ml   Net             -470 ml           Data:   ROUTINE LABS (Last four results)  CMP    Recent Labs  Lab 04/10/17  0759 04/09/17  1949 04/09/17  0602 04/08/17  1515    142 141 142   POTASSIUM 4.0 4.2 4.6 4.8   CHLORIDE 111* 112* 113* 116*   CO2 14* 14* 13* 13*   ANIONGAP 16* 16* 15* 13   * 142* 121* 165*   * 113* 111* 115*   CR 6.70* 6.72* 6.86* 6.98*   GFRESTIMATED 8* 8* 8* 8*   GFRESTBLACK 10* 10* 10* 10*   TRINI 8.8 8.2* 8.4* 8.9   MAG 2.2 2.2 2.4* 2.2   PHOS 5.0* 5.1* 5.1* 4.9*   PROTTOTAL  --   --   --  6.9   ALBUMIN  --   --   --  2.3*   BILITOTAL  --   --   --  0.4   ALKPHOS  --   --   --  92   AST  --   --   --  13   ALT  --   --   --  18     CBC    Recent Labs  Lab 04/09/17  0602 04/08/17  1515 04/04/17  1303   WBC 8.9 8.2  --    RBC 2.99* 3.02*  --    HGB 8.4* 8.3* 8.3*   HCT 26.8* 26.9* 27.0*   MCV 90 89  --    MCH 28.1 27.5  --    MCHC 31.3* 30.9*  --    RDW 16.3* 15.9*  --    * 494*  --      INRNo lab results found in last 7 days.  CRPNo lab results found in last 7 days.      Blood culture:  Invalid input(s): BC   Urine culture:  No results for input(s): URC in the last 168 hours.  All cultures:  No results for input(s): CULT in the last 168 hours.    TTE 4/10/2017:  Interpretation Summary  Left  ventricular systolic function is severely reduced with ejection fraction  estimated at 20-25% with severe global hypokinesis. The left ventricle is  severely dilated (LVIDd 7.60cm) with normal thickness.  Global right ventricular function is normal. Mild right ventricular dilation  is present.  The aortic valve is functionally bicuspid with fusion of the left and right  coronary cusps with sclerosis. There is mild to moderate eccentric aortic  insufficiency that is directed posteriorly.  The aortic root is dilated at the sinus of valsalva(4.6cm). There is a  moderate-sized aneurym involving the proximal ascending aorta (4.6cm).  Dilation of the inferior vena cava is present with normal respiratory  variation in diameter.  Compared to the previous study dated 2/2016, there has been interval reduction  in left ventricular systolic function.        Medications:     Current Facility-Administered Medications   Medication     sevelamer (RENVELA) tablet 800 mg     amLODIPine (NORVASC) tablet 5 mg     aspirin EC EC tablet 81 mg     atorvastatin (LIPITOR) tablet 40 mg     hydrALAZINE (APRESOLINE) tablet 50 mg     isosorbide mononitrate (IMDUR) 24 hr tablet 60 mg     lisinopril (PRINIVIL/ZESTRIL) tablet 40 mg     loratadine (CLARITIN) tablet 10 mg     metoprolol (TOPROL-XL) 24 hr tablet 100 mg     omeprazole (priLOSEC) CR capsule 20 mg     heparin sodium PF injection 5,000 Units     acetaminophen (TYLENOL) tablet 650 mg     docusate sodium (COLACE) capsule 100 mg     ondansetron (ZOFRAN-ODT) ODT tab 4 mg    Or     ondansetron (ZOFRAN) injection 4 mg     glucose 40 % gel 15-30 g    Or     dextrose 50 % injection 25-50 mL    Or     glucagon injection 1 mg     insulin aspart (NovoLOG) inj (RAPID ACTING)     insulin aspart (NovoLOG) inj (RAPID ACTING)     guaiFENesin (ROBITUSSIN) 20 mg/mL solution 10 mL     Facility-Administered Medications Ordered in Other Encounters   Medication     bupivacaine 0.25 % - EPINEPHrine 1:200,000  injection       Caring Physician: Matilda Torres MD  [facility] [place]  Pager Contact: see Physician sticky note
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
## 2017-04-10 NOTE — CONSULTS
CARDIOLOGY CONSULTATION    Name: uMrray Nicholson MRN: [id number]     Age: 62 year old    Date of admission: 4/8/2017 YOB: 1955       Consult indication: hypervolemia, HFrEF         HPI:     Mr. Nicholson is a 61 yo male with a h/o CKD5, DM2, HTN, CAD (last coronary angio in 2006 showing 60-70% LAD lesion in dLAD FFR 0.85 and 30% pRCA-- Right dominant), bicuspid AV and ascending aortic aneurysm (followed by Dr. Posada, last 4.6cm). He has known HFrEF, last LEF 40-45% by MARIANA in 11/29/2016, and a recent TTE done today 4/10/2017 showing LVEF 20-25% LVIDd 7.6cm. He was admitted on 4/8/2017 with dyspnea and concern for hypervolemia for which Cardiology was consulted. Review of records shows that a Nuclear stress test 2/2/2016 showed a LVEF of 34%, TTE 2/16/16 LVEF was traced at 41%.     He states that 1 week ago he was unable to take his medications because he did not have the necessary funds to have them refilled. Since then he has noticed a gradual worsening of dyspnea, as well as the development of orthopnea/PND, which is new. He has not had any change in his LE swelling. He specifically denies any chest pain with exertion or at rest, abnormal or worsening SOB, abd distention, early satiety, or N/V/D. No presyncope, syncope, dizziness or lightheadedness.         Past Medical History:     Past Medical History:   Diagnosis Date     (HFpEF) heart failure with preserved ejection fraction (H)      Allergic rhinitis, cause unspecified      Anemia of chronic kidney failure      Ascending aortic aneurysm (H)      Bicuspid aortic valve      CAD (coronary artery disease)      Chronic kidney disease, stage 5 (H)      Congestive heart failure, unspecified      Dyslipidemia      Esophageal reflux      Hypersomnia with sleep apnea, unspecified      Hypertension      MGUS (monoclonal gammopathy of unknown significance)      SHEELA (obstructive sleep apnea)      Type 2 diabetes mellitus (H)              Past Surgical  History:      Past Surgical History:   Procedure Laterality Date     LAPAROSCOPIC HERNIORRHAPHY INGUINAL BILATERAL Bilateral 2015    Procedure: LAPAROSCOPIC HERNIORRHAPHY INGUINAL BILATERAL;  Surgeon: Bobby Mcconnell MD;  Location:  OR     NO HISTORY OF SURGERY               Social History:     Social History   Substance Use Topics     Smoking status: Former Smoker     Packs/day: 1.00     Years: 19.00     Types: Cigarettes     Quit date: 1994     Smokeless tobacco: Never Used     Alcohol use 0.0 oz/week     0 Standard drinks or equivalent per week      Comment: 1 drink per week             Family History:     Family History   Problem Relation Age of Onset     C.A.D. Father       from-never knew father-age 60     DIABETES Father      Hypertension No family hx of      CEREBROVASCULAR DISEASE Father      Breast Cancer No family hx of      Cancer - colorectal No family hx of      Prostate Cancer No family hx of              Allergies:     Allergies   Allergen Reactions     Cats      Throat tightness     Penicillins Hives     Seasonal Allergies      rhinitis     Shrimp      Throat closes              Medications:   Prior to admission medications:  Prescriptions Prior to Admission   Medication Sig Dispense Refill Last Dose     omeprazole (PRILOSEC) 20 MG CR capsule Take 20 mg by mouth daily   2017 at pm     darbepoetin emily (ARANESP, ALBUMIN FREE,) 100 MCG/0.5ML injection Inject 0.5 mLs (100 mcg) Subcutaneous every 14 days As needed for hgb<10g/dL.  If Hgb increases >1 point in 2 weeks (if blood transfusion given, use hgb PRIOR to this), SYSTOLIC BP > 180 mmHg or hgb>=10g/dL, HOLD DOSE. Dose must be within 1 week of Hgb.  Per anemia protocol with Brice Caraballo MD 0.5 mL 99 2017     albuterol (PROAIR HFA/PROVENTIL HFA/VENTOLIN HFA) 108 (90 BASE) MCG/ACT Inhaler Inhale 2 puffs into the lungs every 6 hours as needed for shortness of breath / dyspnea or wheezing 1 Inhaler 0 2017 at am      furosemide (LASIX) 80 MG tablet Take 1 tablet (80 mg) by mouth 2 times daily (Patient taking differently: Take 80 mg by mouth 2 times daily In the morning and at 3 PM) 90 tablet 3 4/8/2017 at am     atorvastatin (LIPITOR) 40 MG tablet Take 1 tablet (40 mg) by mouth daily (Patient taking differently: Take 40 mg by mouth every evening ) 90 tablet 1 4/7/2017 at pm     hydrALAZINE (APRESOLINE) 50 MG tablet Take 1 tablet (50 mg) by mouth 3 times daily 270 tablet 3 4/8/2017 at am     ferrous sulfate (IRON) 325 (65 FE) MG tablet Take 1 tablet (325 mg) by mouth 3 times daily (with meals) 200 tablet 11 4/8/2017 at am     amLODIPine (NORVASC) 5 MG tablet 1 TABLET BID- generic ok 180 tablet 3 4/8/2017 at am     isosorbide mononitrate (IMDUR) 60 MG 24 hr tablet Take 1 tablet (60 mg) by mouth daily 90 tablet 3 4/8/2017 at am     metoprolol (TOPROL XL) 100 MG 24 hr tablet Take 1 tablet (100 mg) by mouth daily 90 tablet 3 4/8/2017 at am     lisinopril (PRINIVIL,ZESTRIL) 40 MG tablet Take 1 tablet by mouth. one twice daily 180 tablet 3 4/8/2017 at am     potassium chloride SA (K-DUR,KLOR-CON M) 20 MEQ tablet Take 1 tablet (20 mEq) by mouth daily (Patient taking differently: Take 20 mEq by mouth daily Take at 3 PM) 90 tablet 3 4/7/2017 at 3 pm     ASPIRIN 81 MG OR TABS Take 1 tablet (81 mg) by mouth at bedtime   4/7/2017 at hs     glipiZIDE (GLUCOTROL) 5 MG tablet Take 1 tablet by mouth. TAKE 1 TABLET BY MOUTH DAILY BEFORE A MEAL 90 tablet 3 More than a month     azelastine-fluticasone (DYMISTA) 137-50 MCG/ACT nasal spray Spray 1 spray into both nostrils 2 times daily 23 g 11 More than a month at Unknown time     Cholecalciferol (VITAMIN D3 PO) Take 5,000 Units by mouth daily   More than a month     loratadine (CLARITIN) 10 MG tablet Take 10 mg by mouth daily as needed    More than a month        Current medications:  Current Facility-Administered Medications   Medication     cholecalciferol (vitamin D) tablet 5,000 Units     sodium  "bicarbonate tablet 1,300 mg     amLODIPine (NORVASC) tablet 5 mg     atorvastatin (LIPITOR) tablet 40 mg     hydrALAZINE (APRESOLINE) tablet 50 mg     isosorbide mononitrate (IMDUR) 24 hr tablet 60 mg     lisinopril (PRINIVIL/ZESTRIL) tablet 40 mg     loratadine (CLARITIN) tablet 10 mg     metoprolol (TOPROL-XL) 24 hr tablet 100 mg     omeprazole (priLOSEC) CR capsule 20 mg     heparin sodium PF injection 5,000 Units     acetaminophen (TYLENOL) tablet 650 mg     docusate sodium (COLACE) capsule 100 mg     ondansetron (ZOFRAN-ODT) ODT tab 4 mg    Or     ondansetron (ZOFRAN) injection 4 mg     glucose 40 % gel 15-30 g    Or     dextrose 50 % injection 25-50 mL    Or     glucagon injection 1 mg     insulin aspart (NovoLOG) inj (RAPID ACTING)     insulin aspart (NovoLOG) inj (RAPID ACTING)     guaiFENesin (ROBITUSSIN) 20 mg/mL solution 10 mL     Facility-Administered Medications Ordered in Other Encounters   Medication     bupivacaine 0.25 % - EPINEPHrine 1:200,000 injection            Review of Systems:   12-point review of systems was negative except as mentioned in the HPI.          Exam:   /79 (BP Location: Right arm)  Pulse 91  Temp 97.8  F (36.6  C) (Oral)  Resp 16  Ht 1.753 m (5' 9\")  Wt 86.9 kg (191 lb 9.6 oz)  SpO2 100%  BMI 28.29 kg/m2    GEN: A & O, resting comfortably in bed, NAD, cooperative and conversational   HEENT: PERRL, no scleral icterus, mucus membranes moist  NECK: Supple, JVP minimally elevated above clavicle at 90'  RESP: CTA bilaterally  CV: Regular, normal rate, S1 and S2 with physiologic split, no S3, S4, M/G/R   ABD: Soft, nontender to palpation, +BS, no guarding  EXT: trace bilat LE edema   NEURO: CN II-XII intact, normal 5/5 strength in all extremitites  SKIN: Normal skin turgor, no rash on limited exam         Data:   Labs:  CMP  Recent Labs  Lab 04/10/17  0759 04/09/17  1949 04/09/17  0602 04/08/17  1515    142 141 142   POTASSIUM 4.0 4.2 4.6 4.8   CHLORIDE 111* 112* " 113* 116*   CO2 14* 14* 13* 13*   ANIONGAP 16* 16* 15* 13   * 142* 121* 165*   * 113* 111* 115*   CR 6.70* 6.72* 6.86* 6.98*   GFRESTIMATED 8* 8* 8* 8*   GFRESTBLACK 10* 10* 10* 10*   TRINI 8.8 8.2* 8.4* 8.9   MAG 2.2 2.2 2.4* 2.2   PHOS 5.0* 5.1* 5.1* 4.9*   PROTTOTAL  --   --   --  6.9   ALBUMIN  --   --   --  2.3*   BILITOTAL  --   --   --  0.4   ALKPHOS  --   --   --  92   AST  --   --   --  13   ALT  --   --   --  18     CBC  Recent Labs  Lab 04/09/17  0602 04/08/17  1515 04/04/17  1303   WBC 8.9 8.2  --    RBC 2.99* 3.02*  --    HGB 8.4* 8.3* 8.3*   HCT 26.8* 26.9* 27.0*   MCV 90 89  --    MCH 28.1 27.5  --    MCHC 31.3* 30.9*  --    RDW 16.3* 15.9*  --    * 494*  --      INRNo lab results found in last 7 days.  No results found for: TROPI, TROPONIN, TROPR, TROPN  Recent Labs   Lab Test  11/29/16   1120  04/14/16   0958  08/10/15   0729  04/09/15   0900   CHOL  118  211*  198  182   HDL  36*  48  40*  46   LDL  53  115*  Cannot estimate LDL when triglyceride exceeds 400 mg/dL  Cannot estimate LDL when triglyceride exceeds 400 mg/dL  85   TRIG  147  240*  421*  426*   CHOLHDLRATIO   --    --   5.0  4.0            Imaging:     Echocardiogram:  4/10/2017/   Interpretation Summary  Left ventricular systolic function is severely reduced with ejection fraction  estimated at 20-25% with severe global hypokinesis. The left ventricle is  severely dilated (LVIDd 7.60cm) with normal thickness.  Global right ventricular function is normal. Mild right ventricular dilation  is present.  The aortic valve is functionally bicuspid with fusion of the left and right  coronary cusps with sclerosis. There is mild to moderate eccentric aortic  insufficiency that is directed posteriorly.  The aortic root is dilated at the sinus of valsalva(4.6cm). There is a  moderate-sized aneurym involving the proximal ascending aorta (4.6cm).  Dilation of the inferior vena cava is present with normal respiratory  variation in  diameter.  Compared to the previous study dated 2/2016, there has been interval reduction  in left ventricular systolic function.             Assessment and Recomendations:     # HFrEF, LVEF 20-25% (TTE 4/10/2017), reduced from LVEF 40-45% by MARIANA in 11/29/2016  # Medication non-adherence, did not take any of his medications including furosemide for the last week  # CAD  (last coronary angio in 2006 showing 60-70% LAD lesion in dLAD FFR 0.85 and 30% pRCA-- Right dominant)  #  bicuspid AV and ascending aortic aneurysm (followed by Dr. Posada, last 4.6cm)  # HTN  # HL  # CKD5    His reduced LVEF and dyspnea are undoubtedly related to his 1 week drug holiday. He has clinically improved under the excellent care of the primary team and is near his baseline in terms of symptoms. He has responded reasonably well to the boluses of furosemide IV and should tolerate his home cardiac regimen.     - administer an additional bolus of furosemide 80mg IV once tonight  - transition back to home regimen tomorrow   - repeat TTE in 3 months, if LVEF is still <35% he may benefit from ICD placement  - switch amlodipine to qhs administration  - obtain fasting lipids in AM  - he should have a BMP obtained in 3-4 days post d/c since he had the 1 week drug holiday recently     This patient was seen and examined with the attending physician and our recommendations were directly communicated to the referring team.     Thank you for allowing us to participate in the care of this patient. Please do not hesitate to call with any questions or concerns.     Juan Monge MD  PGY4 Cardiology  Pager: [phone]  April 10, 2017    I interviewed and examined the patient with the house staff.  I agree with the assessment and plan as documented.      Bobby Laguna MD, PhD  Professor of Medicine  Division of Cardiology

## 2017-04-11 ENCOUNTER — APPOINTMENT (OUTPATIENT)
Dept: PHYSICAL THERAPY | Facility: CLINIC | Age: 62
DRG: 291 | End: 2017-04-11
Attending: HOSPITALIST
Payer: COMMERCIAL

## 2017-04-11 ENCOUNTER — APPOINTMENT (OUTPATIENT)
Dept: MRI IMAGING | Facility: CLINIC | Age: 62
DRG: 291 | End: 2017-04-11
Attending: FAMILY MEDICINE
Payer: COMMERCIAL

## 2017-04-11 ENCOUNTER — APPOINTMENT (OUTPATIENT)
Dept: GENERAL RADIOLOGY | Facility: CLINIC | Age: 62
DRG: 291 | End: 2017-04-11
Attending: FAMILY MEDICINE
Payer: COMMERCIAL

## 2017-04-11 LAB
ANION GAP SERPL CALCULATED.3IONS-SCNC: 15 MMOL/L (ref 3–14)
BASOPHILS # BLD AUTO: 0.1 10E9/L (ref 0–0.2)
BASOPHILS NFR BLD AUTO: 0.5 %
BUN SERPL-MCNC: 109 MG/DL (ref 7–30)
CALCIUM SERPL-MCNC: 8.9 MG/DL (ref 8.5–10.1)
CHLORIDE SERPL-SCNC: 111 MMOL/L (ref 94–109)
CHOLEST SERPL-MCNC: 101 MG/DL
CO2 SERPL-SCNC: 15 MMOL/L (ref 20–32)
CREAT SERPL-MCNC: 6.23 MG/DL (ref 0.66–1.25)
CRP SERPL-MCNC: 130 MG/L (ref 0–8)
DIFFERENTIAL METHOD BLD: ABNORMAL
EOSINOPHIL # BLD AUTO: 0.5 10E9/L (ref 0–0.7)
EOSINOPHIL NFR BLD AUTO: 5.4 %
ERYTHROCYTE [DISTWIDTH] IN BLOOD BY AUTOMATED COUNT: 15.9 % (ref 10–15)
FERRITIN SERPL-MCNC: 1194 NG/ML (ref 26–388)
GFR SERPL CREATININE-BSD FRML MDRD: 9 ML/MIN/1.7M2
GLUCOSE BLDC GLUCOMTR-MCNC: 144 MG/DL (ref 70–99)
GLUCOSE BLDC GLUCOMTR-MCNC: 156 MG/DL (ref 70–99)
GLUCOSE BLDC GLUCOMTR-MCNC: 177 MG/DL (ref 70–99)
GLUCOSE BLDC GLUCOMTR-MCNC: 250 MG/DL (ref 70–99)
GLUCOSE SERPL-MCNC: 139 MG/DL (ref 70–99)
HCT VFR BLD AUTO: 25.5 % (ref 40–53)
HDLC SERPL-MCNC: 34 MG/DL
HGB BLD-MCNC: 8.3 G/DL (ref 13.3–17.7)
IMM GRANULOCYTES # BLD: 0.1 10E9/L (ref 0–0.4)
IMM GRANULOCYTES NFR BLD: 0.6 %
IRON SATN MFR SERPL: 10 % (ref 15–46)
IRON SERPL-MCNC: 17 UG/DL (ref 35–180)
LDLC SERPL CALC-MCNC: 38 MG/DL
LYMPHOCYTES # BLD AUTO: 1.1 10E9/L (ref 0.8–5.3)
LYMPHOCYTES NFR BLD AUTO: 10.6 %
MAGNESIUM SERPL-MCNC: 2 MG/DL (ref 1.6–2.3)
MCH RBC QN AUTO: 28.4 PG (ref 26.5–33)
MCHC RBC AUTO-ENTMCNC: 32.5 G/DL (ref 31.5–36.5)
MCV RBC AUTO: 87 FL (ref 78–100)
MONOCYTES # BLD AUTO: 0.3 10E9/L (ref 0–1.3)
MONOCYTES NFR BLD AUTO: 3.3 %
NEUTROPHILS # BLD AUTO: 8 10E9/L (ref 1.6–8.3)
NEUTROPHILS NFR BLD AUTO: 79.6 %
NONHDLC SERPL-MCNC: 67 MG/DL
NRBC # BLD AUTO: 0 10*3/UL
NRBC BLD AUTO-RTO: 0 /100
PHOSPHATE SERPL-MCNC: 4.8 MG/DL (ref 2.5–4.5)
PLATELET # BLD AUTO: 392 10E9/L (ref 150–450)
PLATELET # BLD AUTO: 468 10E9/L (ref 150–450)
POTASSIUM SERPL-SCNC: 3.7 MMOL/L (ref 3.4–5.3)
RBC # BLD AUTO: 2.92 10E12/L (ref 4.4–5.9)
SODIUM SERPL-SCNC: 141 MMOL/L (ref 133–144)
TIBC SERPL-MCNC: 176 UG/DL (ref 240–430)
TRANSFERRIN SERPL-MCNC: 137 MG/DL (ref 210–360)
TRIGL SERPL-MCNC: 145 MG/DL
WBC # BLD AUTO: 10 10E9/L (ref 4–11)

## 2017-04-11 PROCEDURE — 85049 AUTOMATED PLATELET COUNT: CPT | Performed by: FAMILY MEDICINE

## 2017-04-11 PROCEDURE — 80061 LIPID PANEL: CPT | Performed by: FAMILY MEDICINE

## 2017-04-11 PROCEDURE — 97162 PT EVAL MOD COMPLEX 30 MIN: CPT | Mod: GP

## 2017-04-11 PROCEDURE — 00000146 ZZHCL STATISTIC GLUCOSE BY METER IP

## 2017-04-11 PROCEDURE — 82306 VITAMIN D 25 HYDROXY: CPT | Performed by: FAMILY MEDICINE

## 2017-04-11 PROCEDURE — 73721 MRI JNT OF LWR EXTRE W/O DYE: CPT | Mod: RT

## 2017-04-11 PROCEDURE — 84100 ASSAY OF PHOSPHORUS: CPT | Performed by: FAMILY MEDICINE

## 2017-04-11 PROCEDURE — 25000128 H RX IP 250 OP 636: Performed by: FAMILY MEDICINE

## 2017-04-11 PROCEDURE — 84466 ASSAY OF TRANSFERRIN: CPT | Performed by: FAMILY MEDICINE

## 2017-04-11 PROCEDURE — 25000132 ZZH RX MED GY IP 250 OP 250 PS 637: Performed by: INTERNAL MEDICINE

## 2017-04-11 PROCEDURE — 25000132 ZZH RX MED GY IP 250 OP 250 PS 637

## 2017-04-11 PROCEDURE — 83550 IRON BINDING TEST: CPT | Performed by: FAMILY MEDICINE

## 2017-04-11 PROCEDURE — 73522 X-RAY EXAM HIPS BI 3-4 VIEWS: CPT

## 2017-04-11 PROCEDURE — 21400006 ZZH R&B CCU INTERMEDIATE UMMC

## 2017-04-11 PROCEDURE — 85025 COMPLETE CBC W/AUTO DIFF WBC: CPT | Performed by: FAMILY MEDICINE

## 2017-04-11 PROCEDURE — 82728 ASSAY OF FERRITIN: CPT | Performed by: FAMILY MEDICINE

## 2017-04-11 PROCEDURE — 25000132 ZZH RX MED GY IP 250 OP 250 PS 637: Performed by: FAMILY MEDICINE

## 2017-04-11 PROCEDURE — 97116 GAIT TRAINING THERAPY: CPT | Mod: GP

## 2017-04-11 PROCEDURE — 40000193 ZZH STATISTIC PT WARD VISIT

## 2017-04-11 PROCEDURE — 72148 MRI LUMBAR SPINE W/O DYE: CPT

## 2017-04-11 PROCEDURE — 86140 C-REACTIVE PROTEIN: CPT | Performed by: FAMILY MEDICINE

## 2017-04-11 PROCEDURE — 97530 THERAPEUTIC ACTIVITIES: CPT | Mod: GP

## 2017-04-11 PROCEDURE — 36415 COLL VENOUS BLD VENIPUNCTURE: CPT | Performed by: FAMILY MEDICINE

## 2017-04-11 PROCEDURE — 80048 BASIC METABOLIC PNL TOTAL CA: CPT | Performed by: FAMILY MEDICINE

## 2017-04-11 PROCEDURE — 83735 ASSAY OF MAGNESIUM: CPT | Performed by: FAMILY MEDICINE

## 2017-04-11 PROCEDURE — 83540 ASSAY OF IRON: CPT | Performed by: FAMILY MEDICINE

## 2017-04-11 RX ORDER — ASPIRIN 81 MG/1
81 TABLET ORAL DAILY
Status: DISCONTINUED | OUTPATIENT
Start: 2017-04-11 | End: 2017-04-13 | Stop reason: HOSPADM

## 2017-04-11 RX ORDER — METOLAZONE 2.5 MG/1
5 TABLET ORAL DAILY
Status: DISCONTINUED | OUTPATIENT
Start: 2017-04-11 | End: 2017-04-11

## 2017-04-11 RX ORDER — FERROUS SULFATE 325(65) MG
325 TABLET ORAL 2 TIMES DAILY
Status: DISCONTINUED | OUTPATIENT
Start: 2017-04-11 | End: 2017-04-13 | Stop reason: HOSPADM

## 2017-04-11 RX ORDER — METOLAZONE 2.5 MG/1
2.5 TABLET ORAL DAILY
Status: DISCONTINUED | OUTPATIENT
Start: 2017-04-12 | End: 2017-04-13 | Stop reason: HOSPADM

## 2017-04-11 RX ADMIN — ACETAMINOPHEN 650 MG: 325 TABLET, FILM COATED ORAL at 22:38

## 2017-04-11 RX ADMIN — FERROUS SULFATE TAB 325 MG (65 MG ELEMENTAL FE) 325 MG: 325 (65 FE) TAB at 19:45

## 2017-04-11 RX ADMIN — DOCUSATE SODIUM 100 MG: 100 CAPSULE, LIQUID FILLED ORAL at 08:02

## 2017-04-11 RX ADMIN — SENNOSIDES AND DOCUSATE SODIUM 2 TABLET: 8.6; 5 TABLET ORAL at 08:04

## 2017-04-11 RX ADMIN — METOPROLOL SUCCINATE 100 MG: 100 TABLET, FILM COATED, EXTENDED RELEASE ORAL at 08:01

## 2017-04-11 RX ADMIN — OMEPRAZOLE 20 MG: 20 CAPSULE, DELAYED RELEASE ORAL at 08:01

## 2017-04-11 RX ADMIN — SODIUM BICARBONATE 650 MG TABLET 1300 MG: at 19:45

## 2017-04-11 RX ADMIN — HEPARIN SODIUM 5000 UNITS: 5000 INJECTION, SOLUTION INTRAVENOUS; SUBCUTANEOUS at 21:48

## 2017-04-11 RX ADMIN — METOLAZONE 5 MG: 2.5 TABLET ORAL at 15:53

## 2017-04-11 RX ADMIN — POLYETHYLENE GLYCOL 3350 17 G: 17 POWDER, FOR SOLUTION ORAL at 08:04

## 2017-04-11 RX ADMIN — ATORVASTATIN CALCIUM 40 MG: 40 TABLET, FILM COATED ORAL at 19:45

## 2017-04-11 RX ADMIN — FUROSEMIDE 80 MG: 80 TABLET ORAL at 08:02

## 2017-04-11 RX ADMIN — VITAMIN D, TAB 1000IU (100/BT) 5000 UNITS: 25 TAB at 08:01

## 2017-04-11 RX ADMIN — INSULIN ASPART 3 UNITS: 100 INJECTION, SOLUTION INTRAVENOUS; SUBCUTANEOUS at 18:26

## 2017-04-11 RX ADMIN — AMLODIPINE BESYLATE 5 MG: 5 TABLET ORAL at 21:47

## 2017-04-11 RX ADMIN — SODIUM BICARBONATE 650 MG TABLET 1300 MG: at 13:52

## 2017-04-11 RX ADMIN — HYDRALAZINE HYDROCHLORIDE 50 MG: 50 TABLET ORAL at 19:46

## 2017-04-11 RX ADMIN — ACETAMINOPHEN 650 MG: 325 TABLET, FILM COATED ORAL at 15:53

## 2017-04-11 RX ADMIN — ACETAMINOPHEN 650 MG: 325 TABLET, FILM COATED ORAL at 08:00

## 2017-04-11 RX ADMIN — INSULIN ASPART 1 UNITS: 100 INJECTION, SOLUTION INTRAVENOUS; SUBCUTANEOUS at 08:18

## 2017-04-11 RX ADMIN — HEPARIN SODIUM 5000 UNITS: 5000 INJECTION, SOLUTION INTRAVENOUS; SUBCUTANEOUS at 06:24

## 2017-04-11 RX ADMIN — HYDRALAZINE HYDROCHLORIDE 50 MG: 50 TABLET ORAL at 08:02

## 2017-04-11 RX ADMIN — HYDRALAZINE HYDROCHLORIDE 50 MG: 50 TABLET ORAL at 13:52

## 2017-04-11 RX ADMIN — FUROSEMIDE 80 MG: 80 TABLET ORAL at 15:53

## 2017-04-11 RX ADMIN — ASPIRIN 81 MG: 81 TABLET, COATED ORAL at 08:31

## 2017-04-11 RX ADMIN — LISINOPRIL 40 MG: 40 TABLET ORAL at 08:01

## 2017-04-11 RX ADMIN — SODIUM BICARBONATE 650 MG TABLET 1300 MG: at 08:01

## 2017-04-11 RX ADMIN — HEPARIN SODIUM 5000 UNITS: 5000 INJECTION, SOLUTION INTRAVENOUS; SUBCUTANEOUS at 13:52

## 2017-04-11 RX ADMIN — ISOSORBIDE MONONITRATE 60 MG: 60 TABLET, EXTENDED RELEASE ORAL at 08:02

## 2017-04-11 ASSESSMENT — ENCOUNTER SYMPTOMS
VOMITING: 0
SHORTNESS OF BREATH: 0
CONFUSION: 0
FEVER: 0
NERVOUS/ANXIOUS: 0
BACK PAIN: 1
PALPITATIONS: 0
AGITATION: 0
ABDOMINAL PAIN: 0
HEADACHES: 0
NAUSEA: 0
CHILLS: 0
DIZZINESS: 0
COUGH: 0

## 2017-04-11 ASSESSMENT — PAIN DESCRIPTION - DESCRIPTORS
DESCRIPTORS: ACHING
DESCRIPTORS: SHARP
DESCRIPTORS: SHARP
DESCRIPTORS: ACHING

## 2017-04-11 NOTE — PROGRESS NOTES
[facility] - Inpatient daily progress note    Date of admission: 4/8/2017  Date of admission: 4/8/2017  Date of service: 4/11/2017.    Updates:  - MRI today         Assessment and Plan:   Assessment:   Murray Nicholson is a 62 year old male with a significant past medical history of chronic kidney disease 5, congestive heart failure (systolic and diastolic), diabetes, hyperlipidemia, coronary artery disease, chronic anemia, and hypertension who presented with fluid overload and acute on chronic kidney injury.   Patient Active Problem List   Diagnosis     Esophageal reflux     Hypersomnia with sleep apnea     Allergic rhinitis     CHF (congestive heart failure) (H)     CKD (chronic kidney disease) stage 5, GFR less than 15 ml/min (H)     Hyperlipidemia LDL goal <100     Hypertension goal BP (blood pressure) < 130/80     Hypertensive cardiopathy     Tubular adenoma     Anemia of chronic disease     Bicuspid aortic valve     CAD (coronary artery disease)     (HFpEF) heart failure with preserved ejection fraction (H)     Anemia in chronic renal disease     Hypertension     Dyslipidemia     MGUS (monoclonal gammopathy of unknown significance)     Ascending aortic aneurysm (H)     Type 2 diabetes mellitus with diabetic chronic kidney disease (H)     Anemia, iron deficiency     Anemia in stage 5 chronic kidney disease (H)     Fluid overload      Plan:   ## Lumbago  ## Right hip pain  Patient has a history of low back pain with groin and leg pain that has occurred in past.  Discomfort was initially described yesterday and seemed musculoskeletal in nature, but pain became much worse over the last 24 hours and patient now has difficulty ambulating due to pain and weakness of right leg.  X-rays of bilateral hips obtained and showed OA and narrowing of L5-S1 joint space.  Labs today were significant for a CRP of 120.  With patient's rapidly progressing symptoms and elevated CRP, differential includes infection,  inflammation and malignancy.  Patient has reported a 40 pound unintentional weight loss over the last few months.  - MRI of lumbar spine and right hip  - Tylenol prn pain      ## Fluid overload:   ## Combined systolic and diastolic CHF:  Fluid overload most likely secondary to patient not taking lasix for last 2 weeks, cardiac dysfunction, and renal failure.  Patient has received multiple doses of IV lasix since admission with a net negative output of -3.5 liters since admission.  TTE performed 4/10 showed significantly reduced EF (20%) compared to MARIANA performed 11/16 that showed EF of 40%.  Cardiology consulted 4/10 and suggested that current heart failure is from the recent drug holiday.    - Strict I/Os with goal 1L net negative on a daily basis  - 2 L/24hr fluid restriction  - Restart home lasix 80 mg po bid today  - Start metolazone 2.5 mg today  - Repeat TTE in 3 months    ## Acute on chronic kidney injury: stable  No indication for emergent dialysis at this time; however, with fluid overload and stage 5 CKD,  nephrology was consulted for further recommendations.    - Nephrology following, appreciate recs  - Continue outpatient HD/transplant workup  - Monitor renal function and electrolytes  - Continue Renvela (phosphate binder)  - Renal, low sodium diet    ## Non-Anion Gap Metabolic Acidosis:  stable  Likely related to hyperchloremia in setting of acute on chronic kidney injury  - Will monitor BMP and draw blood gas if needed        Chronic medical conditions:     ## DM2  - Last HbA1c 6.3 11/16   Home Regimen: Glipizide 5mg PO daily  Hospital Regimen: Medium intensity ISS with glucose monitoring AC, QHS, and 2am     ## Chronic anemia  - Hold Iron supplementation for now  - Continue home darbepoetin emily Q14 day(last dose 4/4/17)     ## Hypertension  ## CAD - single vessel disease (LAD 60-70% focal stenosis on cardiac cath 1/17)  ## Hyperlipidemia  - Continue home atorvastatin 40mg qhs  - Continue home  Metoprolol XR 100mg q24h  - Continue home hydralazine 50mg daily  - Continue home Imdur 60mg q24h  - Continue home lisinopril 40 mg daily     ## Ascending aortic aneurysm - stable     ## Bicuspid Aortic Valve - stable     ## GERD  - Continue home omeprazole 20mg daily     Daily cares -   F:2 L/24hr fluid restriction  E:Repleat as necessary, close monitoring with diuresis/CKD5  N: Renal, low sodium, carb controlled diet  Lines:PIV  Activity:-Up as tolerated  CODE:Full Code  Prophylaxis: SC heparin / SCD for DVT ppx  PCP communication:  - Yahir Turcios  - Contacted at admission: No  - Concerns or updates: no  Dispo: Expected discharge date 2-3 days              Interval History:   Murray Nicholson has no complaints except worsening pain in right leg.  He is having difficulty ambulating because of pain.    No SOB, no chest pain, no abdominal pain.             Review of Systems:   Review of Systems   Constitutional: Negative for chills and fever.   HENT: Negative for congestion.    Respiratory: Negative for cough and shortness of breath (only with ambulation).    Cardiovascular: Positive for leg swelling. Negative for chest pain and palpitations.   Gastrointestinal: Negative for abdominal pain, nausea and vomiting.   Musculoskeletal: Positive for back pain.        Pain of both legs, just below inguinal area.  Right>left   Neurological: Negative for dizziness and headaches.   Psychiatric/Behavioral: Negative for agitation and confusion. The patient is not nervous/anxious.             Physical Exam (Resident / Clinician):   Vitals were reviewed  Temp: 98.3  F (36.8  C) Temp src: Oral BP: 146/86   Heart Rate: 91 Resp: 18 SpO2: 98 % O2 Device: None (Room air)      Physical Exam   Constitutional: He is oriented to person, place, and time. He appears well-developed. No distress.   HENT:   Head: Normocephalic.   Eyes: Conjunctivae are normal.   Neck: Normal range of motion. Neck supple.   Cardiovascular: Normal rate and regular  rhythm.    No murmur heard.  Pulmonary/Chest: Effort normal and breath sounds normal. No respiratory distress. He has no wheezes. He has no rales.   Mild crackles in bases bilaterally   Abdominal: Soft. He exhibits no distension. There is no tenderness.   Musculoskeletal: Normal range of motion. He exhibits edema (1+ lower extremity edema bilaterally). He exhibits no tenderness.   Limited right hip flexion, internal and external rotation.  Left leg has normal hip flexion and extension, internal and external rotation.   Neurological: He is alert and oriented to person, place, and time.   Skin: Skin is warm and dry.   Psychiatric: He has a normal mood and affect. His behavior is normal. Judgment and thought content normal.   Vitals reviewed.      Intake/Output Summary (Last 24 hours) at 04/10/17    Gross per 24 hour   Intake              990 ml   Output             3400 ml   Net             -2400 ml           Data:   ROUTINE LABS (Last four results)  CMP    Recent Labs  Lab 04/11/17  0722 04/10/17  1750 04/10/17  0759 04/09/17  1949  04/08/17  1515    141 140 142  < > 142   POTASSIUM 3.7 3.9 4.0 4.2  < > 4.8   CHLORIDE 111* 111* 111* 112*  < > 116*   CO2 15* 16* 14* 14*  < > 13*   ANIONGAP 15* 15* 16* 16*  < > 13   * 138* 122* 142*  < > 165*   * 112* 110* 113*  < > 115*   CR 6.23* 6.57* 6.70* 6.72*  < > 6.98*   GFRESTIMATED 9* 9* 8* 8*  < > 8*   GFRESTBLACK 11* 10* 10* 10*  < > 10*   TRINI 8.9 8.7 8.8 8.2*  < > 8.9   MAG 2.0 2.2 2.2 2.2  < > 2.2   PHOS 4.8* 5.2* 5.0* 5.1*  < > 4.9*   PROTTOTAL  --   --   --   --   --  6.9   ALBUMIN  --   --   --   --   --  2.3*   BILITOTAL  --   --   --   --   --  0.4   ALKPHOS  --   --   --   --   --  92   AST  --   --   --   --   --  13   ALT  --   --   --   --   --  18   < > = values in this interval not displayed.  CBC    Recent Labs  Lab 04/11/17  1319 04/11/17  0722 04/09/17  0602 04/08/17  1515   WBC 10.0  --  8.9 8.2   RBC 2.92*  --  2.99* 3.02*   HGB 8.3*   --  8.4* 8.3*   HCT 25.5*  --  26.8* 26.9*   MCV 87  --  90 89   MCH 28.4  --  28.1 27.5   MCHC 32.5  --  31.3* 30.9*   RDW 15.9*  --  16.3* 15.9*    468* 452* 494*     INRNo lab results found in last 7 days.  CRP    Recent Labs  Lab 04/11/17  1319   .0*         Blood culture:  Invalid input(s): BC   Urine culture:  No results for input(s): URC in the last 168 hours.  All cultures:  No results for input(s): CULT in the last 168 hours.    TTE 4/10/2017:  Interpretation Summary  Left ventricular systolic function is severely reduced with ejection fraction  estimated at 20-25% with severe global hypokinesis. The left ventricle is  severely dilated (LVIDd 7.60cm) with normal thickness.  Global right ventricular function is normal. Mild right ventricular dilation  is present.  The aortic valve is functionally bicuspid with fusion of the left and right  coronary cusps with sclerosis. There is mild to moderate eccentric aortic  insufficiency that is directed posteriorly.  The aortic root is dilated at the sinus of valsalva(4.6cm). There is a  moderate-sized aneurym involving the proximal ascending aorta (4.6cm).  Dilation of the inferior vena cava is present with normal respiratory  variation in diameter.  Compared to the previous study dated 2/2016, there has been interval reduction  in left ventricular systolic function.    4/11/2017 Bilateral hip x-rays:  IMPRESSION:   1. Mild bilateral hip osteoarthrosis with relative preservation of  joint space.   2. Apparent severe disc space loss at L5-S1. Consider dedicated lumbar  spine radiographs if clinically indicated.         Medications:     Current Facility-Administered Medications   Medication     aspirin EC EC tablet 81 mg     ferrous sulfate (IRON) tablet 325 mg     metolazone (ZAROXOLYN) tablet 5 mg     cholecalciferol (vitamin D) tablet 5,000 Units     sodium bicarbonate tablet 1,300 mg     furosemide (LASIX) tablet 80 mg     senna-docusate  (SENOKOT-S;PERICOLACE) 8.6-50 MG per tablet 2 tablet     polyethylene glycol (MIRALAX/GLYCOLAX) Packet 17 g     amLODIPine (NORVASC) tablet 5 mg     atorvastatin (LIPITOR) tablet 40 mg     hydrALAZINE (APRESOLINE) tablet 50 mg     isosorbide mononitrate (IMDUR) 24 hr tablet 60 mg     lisinopril (PRINIVIL/ZESTRIL) tablet 40 mg     loratadine (CLARITIN) tablet 10 mg     metoprolol (TOPROL-XL) 24 hr tablet 100 mg     omeprazole (priLOSEC) CR capsule 20 mg     heparin sodium PF injection 5,000 Units     acetaminophen (TYLENOL) tablet 650 mg     docusate sodium (COLACE) capsule 100 mg     ondansetron (ZOFRAN-ODT) ODT tab 4 mg    Or     ondansetron (ZOFRAN) injection 4 mg     glucose 40 % gel 15-30 g    Or     dextrose 50 % injection 25-50 mL    Or     glucagon injection 1 mg     insulin aspart (NovoLOG) inj (RAPID ACTING)     insulin aspart (NovoLOG) inj (RAPID ACTING)     guaiFENesin (ROBITUSSIN) 20 mg/mL solution 10 mL     Facility-Administered Medications Ordered in Other Encounters   Medication     bupivacaine 0.25 % - EPINEPHrine 1:200,000 injection       Caring Physician: Matilda Torres MD  [facility], [facility]  Pager Contact: see Physician sticky note

## 2017-04-11 NOTE — PROGRESS NOTES
Nephrology Progress Note  04/11/2017         Murray Nicholson is a 62 year old year old male admitted on 4/8/17 with dyspnea/cough and found to have volume overload/Heart failure exacerbation with worsening renal function. PMH includes Aortic aneurysm, bicuspid aortic valve, Systolic heart failure, CKD 4/5, T2DM     Assessment & Recommendations:      1. CKD4/5 - Creat beginning to decline with diuresis/improved cardiac function.   Etiology of CKD is Diabetic Nephropathy. Followed by Dr Brice Caraballo. Baseline creat ~4-5. Has been in the 6's since admission in setting of Severe cardiomyopathy/HF exacerbation.   - RRT of choice is PD. Dr Donovan did d/w Transplant surgery but given severe cardiomyopathy patient is not appropriate candidate for surgery. Will have to pursue as outpt when more medically optimized.   - No current indication for dialysis.    - Continue diuresis  - Avoid nephrotoxins, hypotension  - Renal dose medications  - Continue daily chemistries     2. Volume status - Hypervolemia with edema, dilated IVC on Echo, CXR with pulmonary edema. Not on supplemental oxygen. -140/. UO 3475 cc last 24 hrs. Weight was 88.9 kg on 2/15/17. Today weight is 86.1 kg.   - Continue diuresis as tolerated  - Have added back his home regimen of po Furosemide, may want to add Metolazone 2.5 mg qd. Reassess in AM  - Received Rurosemide 80 mg IV x 2 yesterday      3. Systolic heart failure/aortic stenosis/aortic aneurysm -   - Echo 4/10 showed worsening of heart failure with EF of 20%  - Current med regimen includes: Hydralazine, Imdur, Lisinopril, Toprol, diuretic  - Cardiology has consulted     4. HTN - Well controlled at this time and improved from last nephrology clinic visit with SBP in the 150/ range. PTA regimen includes: Lasix 80 mg bid, Norvasc 5 mg bid, Hydralaxine 50 mg TID, Lisinopril 40 mg bid, Metoprolol 100 mg qd, Imdur 60 mg qd   - Has intermit cough, likely 2/2 ACE.    - Continue Lisinopril at current  "dose of 40 mg qd.      5. Electrolytes - No acute concerns.      6. Acid base - Acidotic, Bicarb 15, in setting of GFR of 8 ml/mn.   - Started Na Bicarb 1300 mg TID 4/10     7. BMD - Ca 8.8, Phos 5.0  - Continue Renvela 800 mg TID  - Restart cholecalciferol 5000 U qd. Vit D level is 17      8. Anemia - Hgb 8.4, down from 10.2 on 3/15/17. No active signs of bleeding. Likely dilutional given hypervolemia.   - Continue Aranesp 60 meq q 14 dys. Last dose 4/4/17. Next dose due 4/18/17.   - Iron studies today: Ferritin 1194, Fe 17, Tsat 10%.   - Begin iron bid ( done)      Recommendations were communicated to primary team via progress note     Seen and discussed with Dr. Venus Cook, NP   [phone]    Interval History :   Creat is declining  UO is brisk  Major complaint is right hip pain  Reviewed recommendations by Transplant surgery with patient regarding holding off on PD catheter placement given poor surgical risk    Review of Systems:     I reviewed the following systems:  GI: No nausea/vomiting. Has poor appetite   Neuro: no confusion  Constitutional: No fever or chills  CV: breathing has improved and edema. Intermit. dry cough. No chest pain.  ; Voiding w/o doyle    Physical Exam:   I/O last 3 completed shifts:  In: 1240 [P.O.:1240]  Out: 2775 [Urine:2775]   /81 (BP Location: Left arm)  Pulse 91  Temp 99.7  F (37.6  C) (Oral)  Resp 18  Ht 1.753 m (5' 9\")  Wt 86.1 kg (189 lb 14.4 oz)  SpO2 98%  BMI 28.04 kg/m2   GENERAL APPEARANCE: alert, interactive, NAD  EYES: no scleral icterus, pupils equal  PULM: lungs CTA. Breathing is non labored. Not on supplemental oxygen.   CV: mild tachycardia  -edema - no edema  GI: soft, NT, Non distended.   INTEGUMENT: no cyanosis or rash  NEURO: Alert, oriented    Labs:   All labs reviewed by me  Electrolytes/Renal -   Recent Labs   Lab Test  04/11/17   0722  04/10/17   1750  04/10/17   0759   NA  141  141  140   POTASSIUM  3.7  3.9  4.0   CHLORIDE  " 111*  111*  111*   CO2  15*  16*  14*   BUN  109*  112*  110*   CR  6.23*  6.57*  6.70*   GLC  139*  138*  122*   TRINI  8.9  8.7  8.8   MAG  2.0  2.2  2.2   PHOS  4.8*  5.2*  5.0*       CBC -   Recent Labs   Lab Test  04/11/17   0722  04/09/17   0602  04/08/17   1515  04/04/17   1303   03/15/17   1355   WBC   --   8.9  8.2   --    --   5.0   HGB   --   8.4*  8.3*  8.3*   < >  10.2*   PLT  468*  452*  494*   --    --   238    < > = values in this interval not displayed.       LFTs -   Recent Labs   Lab Test  04/08/17   1515  03/15/17   1355  01/04/17   1005   04/14/16   0958   08/10/15   0729   ALKPHOS  92   --    --    --   131   --   127   BILITOTAL  0.4   --    --    --   0.5   --   0.6   ALT  18   --    --    --   18   --   29   AST  13   --    --    --   14   --   29   PROTTOTAL  6.9   --    --    --   6.9   --   7.2   ALBUMIN  2.3*  3.2*  3.0*   < >  3.0*   < >  3.4    < > = values in this interval not displayed.       Iron Panel -   Recent Labs   Lab Test  04/11/17   0722  03/15/17   1355  02/15/17   1023   IRON  17*  30*  28*   IRONSAT  10*  13*  13*   ALBERTINA  1194*  860*  432*       Echo 4/10/17  Interpretation Summary  Left ventricular systolic function is severely reduced with ejection fraction  estimated at 20-25% with severe global hypokinesis. The left ventricle is  severely dilated (LVIDd 7.60cm) with normal thickness.  Global right ventricular function is normal. Mild right ventricular dilation  is present.  The aortic valve is functionally bicuspid with fusion of the left and right  coronary cusps with sclerosis. There is mild to moderate eccentric aortic  insufficiency that is directed posteriorly.  The aortic root is dilated at the sinus of valsalva(4.6cm). There is a  moderate-sized aneurym involving the proximal ascending aorta (4.6cm).  Dilation of the inferior vena cava is present with normal respiratory  variation in diameter.  Compared to the previous study dated 2/2016, there has been interval  reduction  in left ventricular systolic function.    Current Medications:    aspirin EC  81 mg Oral Daily     cholecalciferol  5,000 Units Oral Daily     sodium bicarbonate  1,300 mg Oral TID     furosemide  80 mg Oral BID     senna-docusate  2 tablet Oral At Bedtime     amLODIPine  5 mg Oral At Bedtime     atorvastatin  40 mg Oral QPM     hydrALAZINE  50 mg Oral TID     isosorbide mononitrate  60 mg Oral Daily     lisinopril  40 mg Oral Daily     metoprolol  100 mg Oral Daily     omeprazole  20 mg Oral Daily     heparin  5,000 Units Subcutaneous Q8H     docusate sodium  100 mg Oral BID     insulin aspart  1-7 Units Subcutaneous TID AC     insulin aspart  1-5 Units Subcutaneous At Bedtime        Patricia Cook, LEWIS

## 2017-04-11 NOTE — PLAN OF CARE
Problem: Goal Outcome Summary  Goal: Goal Outcome Summary  PT 6C: PT Eval completed and treatment initiated. Pt required min A for supine<>sit, CGA for sit<>stand, very slow transitions and noted increased pain in GUNJAN hip/groin (R>L) with mvoement. Ambulated 35' with FWW and CGA, antalgic gait pattern with decreased winnie and step length. Session limited by significant pain, recommend pt use walker for ambulation and CGA.     Current discharge recommendation to TCU as pt is significantly below baseline, pt lives alone and has 22 steps to get to 2nd floor apartment. Pending progress in therapy, pain management, and LOS pt may be appropriate to discharge home with HH vs. OP PT. Will continue to follow and update recs appropriately.

## 2017-04-11 NOTE — PLAN OF CARE
"Problem: Cardiac: Heart Failure (Adult)  Goal: Signs and Symptoms of Listed Potential Problems Will be Absent or Manageable (Cardiac: Heart Failure)  Signs and symptoms of listed potential problems will be absent or manageable by discharge/transition of care (reference Cardiac: Heart Failure (Adult) CPG).   D:Up independently in room.  Has a steady gait.   Denies numbness or tingling of lower extremities.   Reports that,  \"it feels like the left leg is dragging\".   Complains of pain across top of right upper thigh and right lower back.  Was medicated with plain tylenol this morning and none since then.   Was examined per Dr Felix Mcmahon, attending.  Also, went down for X-rays Hip S/E 2 views as ordered.  (see results).   No chest pain or shortness of breath.  Lungs are clear.  Has 2+ edema in the lower extremities.   Was informed per Dr. Torres PD cather will be placed \"after optimzing heart function. Per recommendation of cardiologist\".   Patient accepted rationale for delay of catheter placement.   Cr 6.23.   Urine output 275ml.   Rhythm is NSR.  No ectopy HR 84,  Bp 139/78  MAP (101),  O2 sat 98% on room air.   K+ 3.7.   Is being evaluated now by PT.   Possible discharge to home today.       A:Slight limitation in the right lower extremity.   ROM is decreased internal and external rotaiton per Dr. Mcmahon.  CMS is intact.  2+ pedal pulses.  Extremitiy is warm to touch. Up independently.  Gait is steady.  Mild pain right leg and right lower back.  No acute distress.  Pain relieved some with plain tylenol.   Is breathing easy and maintaining normal O2 sats on room air.   Is free of chest pain.   Rhythm is stable.  Afebrile and vital signs are stable.  Condition is stable.     P:Continue to assess tolerance to activity.  Assess gait when up out of bed.  Continue to assess level of comfort.  Assess CMS to lower extremity.  Continue to monitor rhythm, temp and vital signs.  Spot check O2 sats with vital signs and prn any " shortness of breath.   Possible discharge to home today per Dr. RACHEL Torres.

## 2017-04-11 NOTE — PROGRESS NOTES
04/11/17 1300   Quick Adds   Type of Visit Initial PT Evaluation   Living Environment   Lives With alone   Living Arrangements apartment   Home Accessibility bed and bath are not on the first floor   Number of Stairs to Enter Home 5   Number of Stairs Within Home 17  (5+12)   Stair Railings at Home outside, present on right side;inside, present on left side   Transportation Available car   Living Environment Comment Pt lives alone with 5 ELDER, and 5+12 steps to 2nd level apartment (no elevator). Pt has 2 sons who live locally, both work full-time. Pt also works full-time at [employer] in [facility] [place], spends most of the day on his feet.   Self-Care   Usual Activity Tolerance good   Current Activity Tolerance fair   Regular Exercise no   Equipment Currently Used at Home none   Functional Level Prior   Ambulation 0-->independent   Transferring 0-->independent   Toileting 0-->independent   Bathing 0-->independent   Dressing 0-->independent   Eating 0-->independent   Communication 0-->understands/communicates without difficulty   Swallowing 0-->swallows foods/liquids without difficulty   Cognition 0 - no cognition issues reported   Fall history within last six months no   Which of the above functional risks had a recent onset or change? ambulation;transferring   Prior Functional Level Comment Prior to admission, pt reports independence with all mobility and ADLs. Pt reports worsened low back pain and GUNJAN hip pain in last couple weeks, went to chiropractor daily for ~1 week with gradual improvement in symptoms, but has waxed and waned since then. Pt also reports new SOB recently, which ultimately led him to come to hospital.   General Information   Onset of Illness/Injury or Date of Surgery - Date 04/08/17   Referring Physician Matilda Torres MD   Patient/Family Goals Statement to return home   Pertinent History of Current Problem (include personal factors and/or comorbidities that impact the POC) Murray  Bharat is a 62 year old year old male admitted on 4/8/17 with dyspnea/cough and found to have volume overload/Heart failure exacerbation with worsening renal function. PMH includes Aortic aneurysm, bicuspid aortic valve, Systolic heart failure, CKD 4/5, T2DM   Precautions/Limitations fall precautions   Heart Disease Risk Factors Diabetes;Lack of physical activity;Medical history   General Observations Pt received supine in bed, agreeable to PT, VSS on RA, RN ok'd treatment session.   General Info Comments Activity: Up ad edith   Cognitive Status Examination   Orientation orientation to person, place and time   Level of Consciousness alert   Follows Commands and Answers Questions 100% of the time   Personal Safety and Judgment intact   Memory intact   Pain Assessment   Patient Currently in Pain (low back pain, anterior hip (R>L))   Integumentary/Edema   Integumentary/Edema Comments mild BLE edema   Range of Motion (ROM)   ROM Comment BLE observed to be WFL with mobility   Strength   Strength Comments R hip flex 3/5, L hip flex 4/5, R knee ext 3+/5 and knee flex 4/5 (limited by pain), L knee flex/ext 5/5, GUNJAN ankle DF 5/5   Bed Mobility   Bed Mobility Comments Requires min A for supine<>sit with HOB elevated and use of bed rail, assistance required at LEs   Transfer Skills   Transfer Comments sit<>stand with CGA   Gait   Gait Comments Pt ambulates with FWW and CGA, antalgic gait pattern, shortened step legnth with decreased foot clearance, increased weightbearing through UEs.   Balance   Balance Comments Sitting: Good, Standing: Fair/good   Sensory Examination   Sensory Perception Comments Pt denies parasthesias/dysesthesias in extremities   General Therapy Interventions   Planned Therapy Interventions gait training;bed mobility training;balance training;strengthening;transfer training;risk factor education;home program guidelines;progressive activity/exercise   Clinical Impression   Criteria for Skilled Therapeutic  "Intervention yes, treatment indicated   PT Diagnosis impaired functional mobility   Influenced by the following impairments Pain in Bilateral hip/groin and low back with subsequent decreased strength, balance, and activity tolerance   Functional limitations due to impairments bed mobility, transfers, gait, stairs, functional endurance   Clinical Presentation Evolving/Changing   Clinical Presentation Rationale Pt presents with worsening MEADE in the setting of significant PMH and medical comorbidities impacting fucntional mobiltiy and abiltiy to return home safely and indpeendently   Clinical Decision Making (Complexity) Moderate complexity   Therapy Frequency` daily   Predicted Duration of Therapy Intervention (days/wks) 3-5 days   Anticipated Equipment Needs at Discharge (TBD)   Anticipated Discharge Disposition Transitional Care Facility;Home with Assist;Home with Home Therapy   Risk & Benefits of therapy have been explained Yes   Patient, Family & other staff in agreement with plan of care Yes   [facility] TM \"6 Clicks\"   2016, Trustees of [facility], under license to [employer].  All rights reserved.   6 Clicks Short Forms Basic Mobility Inpatient Short Form   [facility]  \"6 Clicks\" V.2 Basic Mobility Inpatient Short Form   1. Turning from your back to your side while in a flat bed without using bedrails? 3 - A Little   2. Moving from lying on your back to sitting on the side of a flat bed without using bedrails? 3 - A Little   3. Moving to and from a bed to a chair (including a wheelchair)? 3 - A Little   4. Standing up from a chair using your arms (e.g., wheelchair, or bedside chair)? 4 - None   5. To walk in hospital room? 3 - A Little   6. Climbing 3-5 steps with a railing? 2 - A Lot   Basic Mobility Raw Score (Score out of 24.Lower scores equate to lower levels of function) 18   Total Evaluation Time   Total Evaluation Time (Minutes) 10     "

## 2017-04-11 NOTE — PLAN OF CARE
Problem: Goal Outcome Summary  Goal: Goal Outcome Summary  Outcome: Improving  D: Pt admitted w/ volume overload and MIGUEL on CKD, Cr 6.57.  I: PRN tylenol, hot/cold packs.  Sched senna added.  2 L fluid restrict.  A: Pt endorses chronic pain in R groin, controlled w/ tylenol and hot/cold packs.  VSS, see flowsheet.  SR.  Independent.  Voiding in urinal.  Sleeping b/w cares.    P: Continue to monitor and notify MDs as necessary of pertinent pt condition changes.  Tx to PO diuretics today.  PD catheter to be sched as outpt.

## 2017-04-12 LAB
ANION GAP SERPL CALCULATED.3IONS-SCNC: 16 MMOL/L (ref 3–14)
BASOPHILS # BLD AUTO: 0.1 10E9/L (ref 0–0.2)
BASOPHILS NFR BLD AUTO: 0.5 %
BUN SERPL-MCNC: 102 MG/DL (ref 7–30)
CALCIUM SERPL-MCNC: 8.2 MG/DL (ref 8.5–10.1)
CHLORIDE SERPL-SCNC: 110 MMOL/L (ref 94–109)
CK SERPL-CCNC: 70 U/L (ref 30–300)
CO2 SERPL-SCNC: 17 MMOL/L (ref 20–32)
CREAT SERPL-MCNC: 5.91 MG/DL (ref 0.66–1.25)
CRP SERPL-MCNC: 200 MG/L (ref 0–8)
DEPRECATED CALCIDIOL+CALCIFEROL SERPL-MC: 23 UG/L (ref 20–75)
DIFFERENTIAL METHOD BLD: ABNORMAL
EOSINOPHIL # BLD AUTO: 0.3 10E9/L (ref 0–0.7)
EOSINOPHIL NFR BLD AUTO: 3.2 %
ERYTHROCYTE [DISTWIDTH] IN BLOOD BY AUTOMATED COUNT: 16 % (ref 10–15)
GFR SERPL CREATININE-BSD FRML MDRD: 10 ML/MIN/1.7M2
GLUCOSE BLDC GLUCOMTR-MCNC: 116 MG/DL (ref 70–99)
GLUCOSE BLDC GLUCOMTR-MCNC: 130 MG/DL (ref 70–99)
GLUCOSE BLDC GLUCOMTR-MCNC: 158 MG/DL (ref 70–99)
GLUCOSE BLDC GLUCOMTR-MCNC: 210 MG/DL (ref 70–99)
GLUCOSE SERPL-MCNC: 123 MG/DL (ref 70–99)
HCT VFR BLD AUTO: 25.3 % (ref 40–53)
HGB BLD-MCNC: 8.3 G/DL (ref 13.3–17.7)
IMM GRANULOCYTES # BLD: 0.1 10E9/L (ref 0–0.4)
IMM GRANULOCYTES NFR BLD: 0.5 %
LYMPHOCYTES # BLD AUTO: 1.4 10E9/L (ref 0.8–5.3)
LYMPHOCYTES NFR BLD AUTO: 13.3 %
MAGNESIUM SERPL-MCNC: 1.8 MG/DL (ref 1.6–2.3)
MCH RBC QN AUTO: 28.2 PG (ref 26.5–33)
MCHC RBC AUTO-ENTMCNC: 32.8 G/DL (ref 31.5–36.5)
MCV RBC AUTO: 86 FL (ref 78–100)
MONOCYTES # BLD AUTO: 0.4 10E9/L (ref 0–1.3)
MONOCYTES NFR BLD AUTO: 4.1 %
NEUTROPHILS # BLD AUTO: 8.4 10E9/L (ref 1.6–8.3)
NEUTROPHILS NFR BLD AUTO: 78.4 %
NRBC # BLD AUTO: 0 10*3/UL
NRBC BLD AUTO-RTO: 0 /100
PHOSPHATE SERPL-MCNC: 4.6 MG/DL (ref 2.5–4.5)
PLATELET # BLD AUTO: 425 10E9/L (ref 150–450)
POTASSIUM SERPL-SCNC: 3.4 MMOL/L (ref 3.4–5.3)
PROCALCITONIN SERPL-MCNC: 0.88 NG/ML
RBC # BLD AUTO: 2.94 10E12/L (ref 4.4–5.9)
SODIUM SERPL-SCNC: 143 MMOL/L (ref 133–144)
TSH SERPL DL<=0.005 MIU/L-ACNC: 1.26 MU/L (ref 0.4–4)
URATE SERPL-MCNC: 15.1 MG/DL (ref 3.5–7.2)
VITAMIN D2 SERPL-MCNC: 6 UG/L
VITAMIN D3 SERPL-MCNC: 17 UG/L
WBC # BLD AUTO: 10.7 10E9/L (ref 4–11)

## 2017-04-12 PROCEDURE — 82550 ASSAY OF CK (CPK): CPT | Performed by: FAMILY MEDICINE

## 2017-04-12 PROCEDURE — 25000125 ZZHC RX 250: Performed by: FAMILY MEDICINE

## 2017-04-12 PROCEDURE — 00000146 ZZHCL STATISTIC GLUCOSE BY METER IP

## 2017-04-12 PROCEDURE — 25000132 ZZH RX MED GY IP 250 OP 250 PS 637: Performed by: INTERNAL MEDICINE

## 2017-04-12 PROCEDURE — 25000128 H RX IP 250 OP 636: Performed by: FAMILY MEDICINE

## 2017-04-12 PROCEDURE — 83735 ASSAY OF MAGNESIUM: CPT | Performed by: FAMILY MEDICINE

## 2017-04-12 PROCEDURE — 80048 BASIC METABOLIC PNL TOTAL CA: CPT | Performed by: FAMILY MEDICINE

## 2017-04-12 PROCEDURE — 86140 C-REACTIVE PROTEIN: CPT | Performed by: FAMILY MEDICINE

## 2017-04-12 PROCEDURE — 87040 BLOOD CULTURE FOR BACTERIA: CPT | Performed by: FAMILY MEDICINE

## 2017-04-12 PROCEDURE — 84145 PROCALCITONIN (PCT): CPT | Performed by: FAMILY MEDICINE

## 2017-04-12 PROCEDURE — 21400006 ZZH R&B CCU INTERMEDIATE UMMC

## 2017-04-12 PROCEDURE — 36415 COLL VENOUS BLD VENIPUNCTURE: CPT | Performed by: FAMILY MEDICINE

## 2017-04-12 PROCEDURE — 84550 ASSAY OF BLOOD/URIC ACID: CPT | Performed by: FAMILY MEDICINE

## 2017-04-12 PROCEDURE — 25000132 ZZH RX MED GY IP 250 OP 250 PS 637

## 2017-04-12 PROCEDURE — 84443 ASSAY THYROID STIM HORMONE: CPT | Performed by: FAMILY MEDICINE

## 2017-04-12 PROCEDURE — 25000132 ZZH RX MED GY IP 250 OP 250 PS 637: Performed by: FAMILY MEDICINE

## 2017-04-12 PROCEDURE — 84100 ASSAY OF PHOSPHORUS: CPT | Performed by: FAMILY MEDICINE

## 2017-04-12 PROCEDURE — 85025 COMPLETE CBC W/AUTO DIFF WBC: CPT | Performed by: FAMILY MEDICINE

## 2017-04-12 RX ORDER — ALLOPURINOL 100 MG/1
50 TABLET ORAL DAILY
Status: DISCONTINUED | OUTPATIENT
Start: 2017-04-13 | End: 2017-04-13 | Stop reason: HOSPADM

## 2017-04-12 RX ORDER — PREDNISONE 20 MG/1
40 TABLET ORAL EVERY 24 HOURS
Status: DISCONTINUED | OUTPATIENT
Start: 2017-04-12 | End: 2017-04-13

## 2017-04-12 RX ADMIN — SODIUM BICARBONATE 650 MG TABLET 1300 MG: at 19:29

## 2017-04-12 RX ADMIN — ASPIRIN 81 MG: 81 TABLET, COATED ORAL at 08:39

## 2017-04-12 RX ADMIN — VITAMIN D, TAB 1000IU (100/BT) 5000 UNITS: 25 TAB at 08:40

## 2017-04-12 RX ADMIN — HEPARIN SODIUM 5000 UNITS: 5000 INJECTION, SOLUTION INTRAVENOUS; SUBCUTANEOUS at 06:08

## 2017-04-12 RX ADMIN — ACETAMINOPHEN 650 MG: 325 TABLET, FILM COATED ORAL at 15:49

## 2017-04-12 RX ADMIN — DOCUSATE SODIUM 100 MG: 100 CAPSULE, LIQUID FILLED ORAL at 19:28

## 2017-04-12 RX ADMIN — FUROSEMIDE 80 MG: 80 TABLET ORAL at 08:40

## 2017-04-12 RX ADMIN — HEPARIN SODIUM 5000 UNITS: 5000 INJECTION, SOLUTION INTRAVENOUS; SUBCUTANEOUS at 14:01

## 2017-04-12 RX ADMIN — INSULIN ASPART 2 UNITS: 100 INJECTION, SOLUTION INTRAVENOUS; SUBCUTANEOUS at 19:14

## 2017-04-12 RX ADMIN — SODIUM BICARBONATE 650 MG TABLET 1300 MG: at 14:01

## 2017-04-12 RX ADMIN — INSULIN ASPART 1 UNITS: 100 INJECTION, SOLUTION INTRAVENOUS; SUBCUTANEOUS at 14:00

## 2017-04-12 RX ADMIN — SODIUM BICARBONATE 650 MG TABLET 1300 MG: at 08:46

## 2017-04-12 RX ADMIN — PREDNISONE 40 MG: 20 TABLET ORAL at 22:27

## 2017-04-12 RX ADMIN — DOCUSATE SODIUM 100 MG: 100 CAPSULE, LIQUID FILLED ORAL at 08:49

## 2017-04-12 RX ADMIN — OMEPRAZOLE 20 MG: 20 CAPSULE, DELAYED RELEASE ORAL at 08:52

## 2017-04-12 RX ADMIN — ACETAMINOPHEN 650 MG: 325 TABLET, FILM COATED ORAL at 06:14

## 2017-04-12 RX ADMIN — FERROUS SULFATE TAB 325 MG (65 MG ELEMENTAL FE) 325 MG: 325 (65 FE) TAB at 19:28

## 2017-04-12 RX ADMIN — ACETAMINOPHEN 650 MG: 325 TABLET, FILM COATED ORAL at 11:35

## 2017-04-12 RX ADMIN — SENNOSIDES AND DOCUSATE SODIUM 2 TABLET: 8.6; 5 TABLET ORAL at 22:27

## 2017-04-12 RX ADMIN — LISINOPRIL 40 MG: 40 TABLET ORAL at 08:38

## 2017-04-12 RX ADMIN — ISOSORBIDE MONONITRATE 60 MG: 60 TABLET, EXTENDED RELEASE ORAL at 08:40

## 2017-04-12 RX ADMIN — ACETAMINOPHEN 650 MG: 325 TABLET, FILM COATED ORAL at 19:28

## 2017-04-12 RX ADMIN — METOLAZONE 2.5 MG: 2.5 TABLET ORAL at 08:39

## 2017-04-12 RX ADMIN — METOPROLOL SUCCINATE 100 MG: 100 TABLET, FILM COATED, EXTENDED RELEASE ORAL at 08:40

## 2017-04-12 RX ADMIN — ATORVASTATIN CALCIUM 40 MG: 40 TABLET, FILM COATED ORAL at 19:28

## 2017-04-12 RX ADMIN — HYDRALAZINE HYDROCHLORIDE 50 MG: 50 TABLET ORAL at 14:01

## 2017-04-12 RX ADMIN — FERROUS SULFATE TAB 325 MG (65 MG ELEMENTAL FE) 325 MG: 325 (65 FE) TAB at 08:40

## 2017-04-12 RX ADMIN — HYDRALAZINE HYDROCHLORIDE 50 MG: 50 TABLET ORAL at 08:39

## 2017-04-12 RX ADMIN — ACETAMINOPHEN 650 MG: 325 TABLET, FILM COATED ORAL at 02:17

## 2017-04-12 RX ADMIN — AMLODIPINE BESYLATE 5 MG: 5 TABLET ORAL at 22:27

## 2017-04-12 RX ADMIN — FUROSEMIDE 80 MG: 80 TABLET ORAL at 15:47

## 2017-04-12 ASSESSMENT — ENCOUNTER SYMPTOMS
BACK PAIN: 1
FEVER: 0
NERVOUS/ANXIOUS: 0
AGITATION: 0
CHILLS: 0
COUGH: 0
DIZZINESS: 0
ABDOMINAL PAIN: 0
PALPITATIONS: 0
VOMITING: 0
HEADACHES: 0
NAUSEA: 0
SHORTNESS OF BREATH: 0
CONFUSION: 0

## 2017-04-12 ASSESSMENT — PAIN DESCRIPTION - DESCRIPTORS
DESCRIPTORS: DULL;SHARP
DESCRIPTORS: ACHING;DISCOMFORT
DESCRIPTORS: ACHING;SHARP

## 2017-04-12 NOTE — CONSULTS
Social Work: Assessment with Discharge Plan    Patient Name:  Murray Nicholson  :  1955  Age:  62 year old  MRN:  [id number]  Risk/Complexity Score:  Filed Complexity Screen Score: 3  Completed assessment with:  Pt    Presenting Information   Reason for Referral:  Discharge plan  Date of Intake:  2017  Referral Source:  Chart Review  Decision Maker:  Pt when able  Alternate Decision Maker:  Pt has two sons listed on facesheet as NOK.  Health Care Directive:  Patient considering completing  Living Situation:  Apartment  Previous Functional Status:  Independent  Patient and family understanding of hospitalization:  Pt states that he has hip pain that may be related to gout.  Pt states his goal is to discharge to home if able.  Cultural/Language/Spiritual Considerations:  Not reported.  Adjustment to Illness:  Pt was pleasant to meet with today.    Physical Health  Reason for Admission:    1. Other hypervolemia, due to increasing chronic renal failure    2. Type 2 diabetes mellitus with chronic kidney disease, without long-term current use of insulin, unspecified CKD stage (H)    3. Renal hypertension, stage 1-4 or unspecified chronic kidney disease    4. Chronic kidney disease, unspecified    5. Long term current use of oral hypoglycemic drug      Services Needed/Recommended:  TCU pending progress with therapies may be able to discharge to home.    Mental Health/Chemical Dependency  Diagnosis:  NA  Support/Services in Place:  NA  Services Needed/Recommended: NA    Support System  Significant relationship at present time:  Pt has an SO and sons listed as emergency contacts.  Family of origin is available for support:  Yes  Other support available:  Yes  Gaps in support system:  Pt states that he is needing FMLA support for any follow up.  SW gave pt phone number and fax number for unit if HR needs to send forms.  Patient is caregiver to:  None     Provider Information   Primary Care Physician:  Yahir Turcios  Alex   [phone]   Clinic:  [place] [address] /  [facility] [address]      :  LUIS    Financial   Income Source:  Employed through [employer]  Financial Concerns:  Pt is needing assistance with FMLA forms.  Pt would like to discuss medical needs, and follow up appointments with [facility] team to be able to apply for FMLA.  Insurance:    Payor/Plan Subscriber Name Rel Member # Group #   OTHER TRANSPLANT - CI* SANCHEZ MCNEIL  [id number]      [employer] TRANSPLANT CLAIMS, [address]       Discharge Plan   Patient and family discharge goal:  Home if able.  Provided education on discharge plan:  YES  Patient agreeable to discharge plan:  YES, pt agreeable to consider TCU if needed  A list of Medicare Certified Facilities was provided to the patient and/or family to encourage patient choice. Patient's choices for facility are:  NA - pt given list and will review if needed.  Will NH provide Skilled rehabilitation or complex medical:  TBD  General information regarding anticipated insurance coverage and possible out of pocket cost was discussed. Patient and patient's family are aware patient may incur the cost of transportation to the facility, pending insurance payment: YES  Barriers to discharge:  Medical stability    Discharge Recommendations   Anticipated Disposition:  Facility vs home with homecare pending progress in therapies.  Transportation Needs:  Other:  TBD  Name of Transportation Company and Phone:  TBD    Additional comments   SW met with pt and introduced self and role in consult.  Pt states his preference is to discharge to home with homecare or to discharge to home with OP rehab.  Pt states he is willing to consider TCU if that is needed.  SW to connect with rehab team tomorrow to assess progress in therapies.  Pt is requesting to discuss with [facility]'s team how to present his current medical condition and future medical needs/follow up visits to his employer for FMLA.  Pt  states his employer may want to call the unit to discuss with the physicians.  SW gave pt phone number to 6C as well as fax number in the event paperwork needs to be filled out for services.  SW paged [facility] team to inform them of pt's request.      SW also followed up on previous consults for HCD.  Pt states that he has no questions or concerns related to the HCD.  SW to follow for discharge planning as needed, RNCC also updated.    NIRAV Tyler, APSW  6C Unit   Phone: [phone]  Pager: [phone]  Unit: [phone]

## 2017-04-12 NOTE — PLAN OF CARE
Problem: Goal Outcome Summary  Goal: Goal Outcome Summary  PT 6C: Cancel- pt just about to shower upon attempt, unable to check back. Will reschedule.

## 2017-04-12 NOTE — CONSULTS
[facility] Rheumatology Consultation    Murray Nicholson MRN# [id number]   Age: 62 year old YOB: 1955     Date of Admission:  4/8/2017    Reason for consult: For right hip pain       Requesting physician: Matilda oTrres       Level of consult: Consult, follow and place orders           Impression and Recommendation:     Problem list:  # Acute Gouty arthritis  #Hyperuricemia  #CKD stage 4/5  #HFrEF, LVEF 20-25% (systolic and diastolic)    Discussion:  His features of right hip pain , decrease ROM along with presence of soft tissue edema in MRI elicit towards inflammatory arthritis.   This is monoarticular given his history and more subacute in onset.    Other differentials could be pseudogout but given history of CKD, hyperuricemia would favor diagnoses of acute gouty arthritis.  Also CRP elevation would be explained by this.  Arthrocentesis not performed as he does not really have effusion and also hip joint is not as accessible as other joint but would certainly want to do arthrocentesis in the future for confirming diagnoses and this was explained to the patient          Recommendations:  -prednisone 40 mg for 5 days, 20 for 5 days and then continue 10 mg  Daily until rheumatology clinic visit  - start allopurinol  at 50 mg daily along with prednisone ; make sure he has baseline labs including LFTs and CBC. Please order outpatient repeat LFTs and CBC in 2 weeks, prior to rheumatology clinic visit.     - Additionally, if ok with cardiology, suggest switching ACE inhibitor to losartan, which is uricosuric.     - Follow up with rheumatology in 2-3 weeks (please schedule with NP Des Mayo).            The patient was seen and discussed with attending KARLA Drummond  PGY-3  Internal Medicine  P;[phone]         Chief Complaint:      History is obtained from the patient         History of Present Illness:   This patient is a 62 year old male with PMH of CKD stage 5, CHF  (systolic and diastolic), diabetes, HLD, CAD, chronic anemia, HTN who was admitted with fluid overload and acute on chronic kidney injury.    Currently getting diuresis with furosemide 80 mg PO BID.      Patient is now having worsening right hip pain on going since 3 days.  Got worse since Monday    He does have h/o low back pain that happened 3 weeks back and  Radiation to bilateral anterolateral thighs; these symptoms improved with chiropractic care last week.  Now since few days he feel like he is getting his symptoms back. He describe it as pain in the right groin region and anterolateral region of thigh majorly when he tries to get up or move around.  He also has some ongoing pain in lower back pain and left hip pain but this is not as severe. This hip pain is more achy and dull type , occasionally sharp moving to medial part. But does not have low back pain together with this   His pain gets worse with movement and especially lifting his right leg.  Occasional radiation to medial thigh.  He does not have swelling in the area. Denies fever, chills, swelling, deformity or other joints.   Denies rash, dry mouth, dry eyes, other joint pains or swelling currently.      He does have previous h/o gouty arthritis; this diagnosis was made clinically and no aspiration results. He has been on prednisone for this before which he calls as miracle drug as this really makes difference as per him. Also was attempted to start on allopurinol but immediately flared up  Last time he was on prednisone was in March  Does have hyperuricemia with uric acid to 15.1    Denies, h/o surgery or previous arthritis. Denies these symptoms before.                Past Medical History:     Past Medical History:   Diagnosis Date     (HFpEF) heart failure with preserved ejection fraction (H)      Allergic rhinitis, cause unspecified      Anemia of chronic kidney failure      Ascending aortic aneurysm (H)      Bicuspid aortic valve      CAD  (coronary artery disease)      Chronic kidney disease, stage 5 (H)      Congestive heart failure, unspecified      Dyslipidemia      Esophageal reflux      Hypersomnia with sleep apnea, unspecified      Hypertension      MGUS (monoclonal gammopathy of unknown significance)      SHEELA (obstructive sleep apnea)      Type 2 diabetes mellitus (H)              Past Surgical History:     Past Surgical History:   Procedure Laterality Date     LAPAROSCOPIC HERNIORRHAPHY INGUINAL BILATERAL Bilateral 2015    Procedure: LAPAROSCOPIC HERNIORRHAPHY INGUINAL BILATERAL;  Surgeon: Bobby Mcconnell MD;  Location:  OR     NO HISTORY OF SURGERY               Social History:     Social History   Substance Use Topics     Smoking status: Former Smoker     Packs/day: 1.00     Years: 19.00     Types: Cigarettes     Quit date: 1994     Smokeless tobacco: Never Used     Alcohol use 0.0 oz/week     0 Standard drinks or equivalent per week      Comment: 1 drink per week             Family History:     Family History   Problem Relation Age of Onset     C.A.D. Father       from-never knew father-age 60     DIABETES Father      Hypertension No family hx of      CEREBROVASCULAR DISEASE Father      Breast Cancer No family hx of      Cancer - colorectal No family hx of      Prostate Cancer No family hx of      Family history reviewed and updated in [facility]          Immunizations:     Immunization History   Administered Date(s) Administered     Hepatitis A Vac Ped/Adol-2 Dose 2008, 2010     Hepatitis B 10/07/2015, 2015, 2016     Influenza (H1N1) 2010     Influenza (IIV3) 2003, 2006, 2008, 2009, 2010, 10/22/2011, 2012     Influenza Vaccine IM 3yrs+ 4 Valent IIV4 2013, 2014, 2015, 2016     Pneumococcal (PCV 13) 2015     Pneumococcal 23 valent 2005, 2011     TD (ADULT, 7+) 2004     TDAP Vaccine (Adacel) 2013     Zoster  vaccine, live 04/09/2015             Allergies:     Allergies   Allergen Reactions     Cats      Throat tightness     Penicillins Hives     Seasonal Allergies      rhinitis     Shrimp      Throat closes              Medications:     Prescriptions Prior to Admission   Medication Sig Dispense Refill Last Dose     omeprazole (PRILOSEC) 20 MG CR capsule Take 20 mg by mouth daily   4/7/2017 at pm     darbepoetin emily (ARANESP, ALBUMIN FREE,) 100 MCG/0.5ML injection Inject 0.5 mLs (100 mcg) Subcutaneous every 14 days As needed for hgb<10g/dL.  If Hgb increases >1 point in 2 weeks (if blood transfusion given, use hgb PRIOR to this), SYSTOLIC BP > 180 mmHg or hgb>=10g/dL, HOLD DOSE. Dose must be within 1 week of Hgb.  Per anemia protocol with Brice Caraballo MD 0.5 mL 99 4/4/2017     albuterol (PROAIR HFA/PROVENTIL HFA/VENTOLIN HFA) 108 (90 BASE) MCG/ACT Inhaler Inhale 2 puffs into the lungs every 6 hours as needed for shortness of breath / dyspnea or wheezing 1 Inhaler 0 4/8/2017 at am     furosemide (LASIX) 80 MG tablet Take 1 tablet (80 mg) by mouth 2 times daily (Patient taking differently: Take 80 mg by mouth 2 times daily In the morning and at 3 PM) 90 tablet 3 4/8/2017 at am     atorvastatin (LIPITOR) 40 MG tablet Take 1 tablet (40 mg) by mouth daily (Patient taking differently: Take 40 mg by mouth every evening ) 90 tablet 1 4/7/2017 at pm     hydrALAZINE (APRESOLINE) 50 MG tablet Take 1 tablet (50 mg) by mouth 3 times daily 270 tablet 3 4/8/2017 at am     ferrous sulfate (IRON) 325 (65 FE) MG tablet Take 1 tablet (325 mg) by mouth 3 times daily (with meals) 200 tablet 11 4/8/2017 at am     amLODIPine (NORVASC) 5 MG tablet 1 TABLET BID- generic ok 180 tablet 3 4/8/2017 at am     isosorbide mononitrate (IMDUR) 60 MG 24 hr tablet Take 1 tablet (60 mg) by mouth daily 90 tablet 3 4/8/2017 at am     metoprolol (TOPROL XL) 100 MG 24 hr tablet Take 1 tablet (100 mg) by mouth daily 90 tablet 3 4/8/2017 at am     lisinopril  (PRINIVIL,ZESTRIL) 40 MG tablet Take 1 tablet by mouth. one twice daily 180 tablet 3 4/8/2017 at am     potassium chloride SA (K-DUR,KLOR-CON M) 20 MEQ tablet Take 1 tablet (20 mEq) by mouth daily (Patient taking differently: Take 20 mEq by mouth daily Take at 3 PM) 90 tablet 3 4/7/2017 at 3 pm     ASPIRIN 81 MG OR TABS Take 1 tablet (81 mg) by mouth at bedtime   4/7/2017 at hs     glipiZIDE (GLUCOTROL) 5 MG tablet Take 1 tablet by mouth. TAKE 1 TABLET BY MOUTH DAILY BEFORE A MEAL 90 tablet 3 More than a month     azelastine-fluticasone (DYMISTA) 137-50 MCG/ACT nasal spray Spray 1 spray into both nostrils 2 times daily 23 g 11 More than a month at Unknown time     Cholecalciferol (VITAMIN D3 PO) Take 5,000 Units by mouth daily   More than a month     loratadine (CLARITIN) 10 MG tablet Take 10 mg by mouth daily as needed    More than a month             Review of Systems:   CONSTITUTIONAL:  Feeling of shortness of breath and hip pain especially with ambulation  EYES:  Denies blurring of vision  HEENT:  Denies headache, oral ulceration, dry mouth or dry eyes  RESPIRATORY:  Ongoing cough and shortness of breath  Denies chest pain  CARDIOVASCULAR:  Leg swelling  GASTROINTESTINAL:  Denies nausea, vomiting and diarrhea   GENITOURINARY:  Denies urinary symptoms  MUSCULOSKELETAL:  Right hip pain with decrease range of motion  NEUROLOGICAL:  Denies headache          Physical Exam:   Temp: 99  F (37.2  C) Temp src: Oral BP: 138/75   Heart Rate: 91 Resp: 16 SpO2: 99 % O2 Device: None (Room air)       Physical Exam   Constitutional: He is oriented to person, place, and time. He appears well-developed and well-nourished. No distress.   HENT: NC, AT: PERRLA  Eyes: Pupils are equal, round, and reactive to light.   Neck: Neck supple.   Cardiovascular: Normal rate, regular rhythm and normal heart sounds. Exam reveals no gallop.   No murmur heard.  Pulmonary/Chest: Effort normal. No respiratory distress. He has no wheezes. Mild  crackles   Abdominal: Soft. There is no tenderness. There is no rebound and no guarding.   Musculoskeletal: He exhibits edema (3+ pitting edema bilateral lower extremities to proximal leg below the knee). He exhibits no tenderness.   No midline spinal or paraspinal back tenderness    Right hip: presence of decrease in motion and needing assistance to get back to bed due to pain in right hip  No obvious swelling is appreciated;   No tenderness on trochanteric region or thigh region and he mentioned he actually feels pain is better on palpation  ROM; decreased in right hip; limited active range of motion; on passive ROM- tenderness on adduction and flexion in right hip        Left hip: tenderness with flexion but not as worse in right      SLRT negative     Right MTP; presence of mild tenderness and warmth but no obvious swelling    Neurological: He is alert and oriented to person, place, and time.   Skin: Skin is warm and dry.   Psychiatric: He has a normal mood and affect.                 Data:        Lab Results   Component Value Date     04/12/2017    Lab Results   Component Value Date    CHLORIDE 110 04/12/2017    Lab Results   Component Value Date     04/12/2017      Lab Results   Component Value Date    POTASSIUM 3.4 04/12/2017    Lab Results   Component Value Date    CO2 17 04/12/2017    Lab Results   Component Value Date    CR 5.91 04/12/2017      CRP to 120 and uric acid to 15    MRI hip :  IMPRESSION:  1. Limited evaluation of the right hip due to patient's inability to  tolerate the scan.  2. Subchondral cystic changes in the right hip acetabulum, with likely  associated degenerative change, however this is not is not well  evaluated on the study that was obtained.  3. No marrow signal abnormalities in the right hemipelvis to suggest  fracture, osteonecrosis, or marrow infiltration.  4. Diffuse soft tissue edema without atrophy or fatty infiltration  within the muscles of the right hemipelvis,  including the right  adductor muscles, right gluteus minimus muscle, as well as the right  vastus lateralis and vastus intermedius muscles, findings are  nonspecific, however possibilities include myositis, inflammatory or  infectious. Correlate clinically.         I have reviewed today's vital signs, notes, medications, labs and imaging.    Jermain Rowe MD         Attestation:  I was present with Dr. Rowe during cueva aspects of history and physical examination, was directly involved in medical decision making and management of this patient, and I agree with the documentation, findings, and plan above with the following additions:    Patient with multiple risk factors (CKD, aggressive diuresis, severe hyperuricemia), and clinical symptoms of recurrent gout flares (R great toe acute arthritis in January which resolved with prednisone, recurrence of R great toe acute arthritis when started on allopurinol, R hip acute arthritis 3 weeks ago, resolving gradually and worsening over this weekend with aggressive diuresis). No easily accessible swollen joint today to establish a definitive diagnosis, but clinically all of the above strongly suggests gout, and the risk of relapse is high. Hence I recommend starting the patient on acute gout treatment and urate-lowering therapy as detailed above. Plan to treat to target of serum uric acid <6 mg/dl. Will likely need continued therapy escalation and close monitoring, hence suggest follow up in rheumatology clinic.       I discussed the findings and recommendations with the patient.  Recommendations were discussed with the consulting team.  Time spent: 90 minutes    Jennifer Gaston MD, MS  Rheumatology Attending Physician  [facility] [phone]

## 2017-04-12 NOTE — PROGRESS NOTES
[facility] - Inpatient daily progress note    Date of admission: 4/8/2017  Date of admission: 4/8/2017  Date of service: 4/12/2017.           Assessment and Plan:   Assessment:   Murray Nicholson is a 62 year old male with a significant past medical history of chronic kidney disease 5, congestive heart failure (systolic and diastolic), diabetes, hyperlipidemia, coronary artery disease, chronic anemia, and hypertension who presented with fluid overload and acute on chronic kidney injury.   Patient Active Problem List   Diagnosis     Esophageal reflux     Hypersomnia with sleep apnea     Allergic rhinitis     CHF (congestive heart failure) (H)     CKD (chronic kidney disease) stage 5, GFR less than 15 ml/min (H)     Hyperlipidemia LDL goal <100     Hypertension goal BP (blood pressure) < 130/80     Hypertensive cardiopathy     Tubular adenoma     Anemia of chronic disease     Bicuspid aortic valve     CAD (coronary artery disease)     (HFpEF) heart failure with preserved ejection fraction (H)     Anemia in chronic renal disease     Hypertension     Dyslipidemia     MGUS (monoclonal gammopathy of unknown significance)     Ascending aortic aneurysm (H)     Type 2 diabetes mellitus with diabetic chronic kidney disease (H)     Anemia, iron deficiency     Anemia in stage 5 chronic kidney disease (H)     Fluid overload      Plan:   ## Lumbago  ## Right hip pain:  Worsening  Patient has a history of low back pain with groin and leg pain that has occurred in past. Pain has increased over the last 48 hours and ROM has decreased. MRI of lumbar spine and right hip performed 4/11.  MRI showed edema of the soft tissues and muscles of the right hip.  With patient's rapidly progressing symptoms and elevated CRP, differential includes infection, inflammation and malignancy.  Myositis is less likely with normal CK.  Patient has reported a 40 pound unintentional weight loss over the last few months.  - Rheumatology  consult  - Blood cultures  - Tylenol prn pain      ## Fluid overload: Improving  ## Combined systolic and diastolic CHF: Stable  Fluid overload most likely secondary to patient not taking lasix for last 2 weeks, cardiac dysfunction, and renal failure.  Patient has received multiple doses of IV lasix since admission with a net negative output of -3.5 liters since admission.  TTE performed 4/10 showed significantly reduced EF (20%) compared to MARIANA performed 11/16 that showed EF of 40%.  Cardiology consulted 4/10 and suggested that current heart failure is from the recent drug holiday.    - Strict I/Os with goal 1L net negative on a daily basis  - 2 L/24hr fluid restriction  - Continue home lasix 80 mg po bid   - Staredt metolazone 2.5 mg 4/11  - Repeat TTE in 3 months    ## Acute on chronic kidney injury: stable  No indication for emergent dialysis at this time; however, with fluid overload and stage 5 CKD,  nephrology was consulted for further recommendations.    - Nephrology following, appreciate recs  - Continue outpatient HD/transplant workup  - Monitor renal function and electrolytes  - Continue Renvela (phosphate binder)  - Renal, low sodium diet    ## Non-Anion Gap Metabolic Acidosis:  stable  Likely related to hyperchloremia in setting of acute on chronic kidney injury  - Will monitor BMP and draw blood gas if needed        Chronic medical conditions:     ## DM2  - Last HbA1c 6.3 11/16   Home Regimen: Glipizide 5mg PO daily  Hospital Regimen: Medium intensity ISS with glucose monitoring AC, QHS, and 2am     ## Chronic anemia  - Hold Iron supplementation for now  - Continue home darbepoetin emily Q14 day(last dose 4/4/17)     ## Hypertension  ## CAD - single vessel disease (LAD 60-70% focal stenosis on cardiac cath 1/17)  ## Hyperlipidemia  - Continue home atorvastatin 40mg qhs  - Continue home Metoprolol XR 100mg q24h  - Continue home hydralazine 50mg daily  - Continue home Imdur 60mg q24h  - Continue home  lisinopril 40 mg daily     ## Ascending aortic aneurysm - stable     ## Bicuspid Aortic Valve - stable     ## GERD  - Continue home omeprazole 20mg daily     Daily cares -   F:2 L/24hr fluid restriction  E:Repleat as necessary, close monitoring with diuresis/CKD5  N: Renal, low sodium, carb controlled diet  Lines:PIV  Activity:-Up as tolerated  CODE:Full Code  Prophylaxis: SC heparin / SCD for DVT ppx  PCP communication:  - Yahir Turcios  - Contacted at admission: No  - Concerns or updates: no  Dispo: Expected discharge date 1-2 days              Interval History:   Murray Nicholson continues to complain of right leg pain and weakness.  Symptoms are similar to yesterday, not worse.  MRI of right hip was especially painful for him last night.    No SOB, no chest pain, appetite is good.              Review of Systems:   Review of Systems   Constitutional: Negative for chills and fever.   HENT: Negative for congestion.    Respiratory: Negative for cough and shortness of breath (only with ambulation).    Cardiovascular: Positive for leg swelling. Negative for chest pain and palpitations.   Gastrointestinal: Negative for abdominal pain, nausea and vomiting.   Musculoskeletal: Positive for back pain.        Pain of both legs, just below inguinal area.  Right>left   Neurological: Negative for dizziness and headaches.   Psychiatric/Behavioral: Negative for agitation and confusion. The patient is not nervous/anxious.             Physical Exam (Resident / Clinician):   Vitals were reviewed  Temp: 98  F (36.7  C) Temp src: Axillary BP: 140/73   Heart Rate: 84 Resp: 16 SpO2: 99 % O2 Device: None (Room air)      Physical Exam   Constitutional: He is oriented to person, place, and time. He appears well-developed. No distress.   HENT:   Head: Normocephalic.   Eyes: Conjunctivae are normal.   Neck: Normal range of motion. Neck supple.   Cardiovascular: Normal rate and regular rhythm.    No murmur heard.  Pulmonary/Chest: Effort  normal and breath sounds normal. No respiratory distress. He has no wheezes. He has no rales.   Abdominal: Soft. He exhibits no distension. There is no tenderness.   Musculoskeletal: Normal range of motion. He exhibits edema (trace edema). He exhibits no tenderness.   Limited right hip flexion, internal and external rotation.  ROM mildly decreased today compared to yesterday.  Left leg has normal hip flexion and extension, internal and external rotation.   Neurological: He is alert and oriented to person, place, and time.   Skin: Skin is warm and dry.   Psychiatric: He has a normal mood and affect. His behavior is normal. Judgment and thought content normal.   Vitals reviewed.      Intake/Output Summary (Last 24 hours) at 04/11/17    Gross per 24 hour   Intake              1300 ml   Output             2100 ml   Net             -800 ml           Data:   ROUTINE LABS (Last four results)  CMP    Recent Labs  Lab 04/12/17  0856 04/11/17  0722 04/10/17  1750 04/10/17  0759  04/08/17  1515    141 141 140  < > 142   POTASSIUM 3.4 3.7 3.9 4.0  < > 4.8   CHLORIDE 110* 111* 111* 111*  < > 116*   CO2 17* 15* 16* 14*  < > 13*   ANIONGAP 16* 15* 15* 16*  < > 13   * 139* 138* 122*  < > 165*   * 109* 112* 110*  < > 115*   CR 5.91* 6.23* 6.57* 6.70*  < > 6.98*   GFRESTIMATED 10* 9* 9* 8*  < > 8*   GFRESTBLACK 12* 11* 10* 10*  < > 10*   TRINI 8.2* 8.9 8.7 8.8  < > 8.9   MAG 1.8 2.0 2.2 2.2  < > 2.2   PHOS 4.6* 4.8* 5.2* 5.0*  < > 4.9*   PROTTOTAL  --   --   --   --   --  6.9   ALBUMIN  --   --   --   --   --  2.3*   BILITOTAL  --   --   --   --   --  0.4   ALKPHOS  --   --   --   --   --  92   AST  --   --   --   --   --  13   ALT  --   --   --   --   --  18   < > = values in this interval not displayed.  CBC    Recent Labs  Lab 04/12/17  0856 04/11/17  1319 04/11/17  0722 04/09/17  0602 04/08/17  1515   WBC 10.7 10.0  --  8.9 8.2   RBC 2.94* 2.92*  --  2.99* 3.02*   HGB 8.3* 8.3*  --  8.4* 8.3*   HCT 25.3* 25.5*   --  26.8* 26.9*   MCV 86 87  --  90 89   MCH 28.2 28.4  --  28.1 27.5   MCHC 32.8 32.5  --  31.3* 30.9*   RDW 16.0* 15.9*  --  16.3* 15.9*    392 468* 452* 494*     INRNo lab results found in last 7 days.  CRP    Recent Labs  Lab 04/12/17  0935 04/11/17  1319   .0* 130.0*         Blood culture:  Invalid input(s): BC   Urine culture:  No results for input(s): URC in the last 168 hours.  All cultures:  No results for input(s): CULT in the last 168 hours.    TTE 4/10/2017:  Interpretation Summary  Left ventricular systolic function is severely reduced with ejection fraction  estimated at 20-25% with severe global hypokinesis. The left ventricle is  severely dilated (LVIDd 7.60cm) with normal thickness.  Global right ventricular function is normal. Mild right ventricular dilation  is present.  The aortic valve is functionally bicuspid with fusion of the left and right  coronary cusps with sclerosis. There is mild to moderate eccentric aortic  insufficiency that is directed posteriorly.  The aortic root is dilated at the sinus of valsalva(4.6cm). There is a  moderate-sized aneurym involving the proximal ascending aorta (4.6cm).  Dilation of the inferior vena cava is present with normal respiratory  variation in diameter.  Compared to the previous study dated 2/2016, there has been interval reduction  in left ventricular systolic function.    4/11/2017 Bilateral hip x-rays:  IMPRESSION:   1. Mild bilateral hip osteoarthrosis with relative preservation of  joint space.   2. Apparent severe disc space loss at L5-S1. Consider dedicated lumbar  spine radiographs if clinically indicated.     4/11/2017 MRI Lumbar Spine:  Impression: Multilevel lumbar degenerative changes with moderate left  neural foraminal narrowing at L5-S1. Additional multilevel mild spinal  canal and neural foraminal narrowing.    4/11/2017 MRI Right Hip  IMPRESSION:  1. Limited evaluation of the right hip due to patient's inability  to  tolerate the scan.  2. Subchondral cystic changes in the right hip acetabulum, with likely  associated degenerative change, however this is not is not well  evaluated on the study that was obtained.  3. No marrow signal abnormalities in the right hemipelvis to suggest  fracture, osteonecrosis, or marrow infiltration.  4. Diffuse soft tissue edema without atrophy or fatty infiltration  within the muscles of the right hemipelvis, including the right  adductor muscles, right gluteus minimus muscle, as well as the right  vastus lateralis and vastus intermedius muscles, findings are  nonspecific, however possibilities include myositis, inflammatory or  infectious. Correlate clinically.      Medications:     Current Facility-Administered Medications   Medication     aspirin EC EC tablet 81 mg     ferrous sulfate (IRON) tablet 325 mg     metolazone (ZAROXOLYN) tablet 2.5 mg     cholecalciferol (vitamin D) tablet 5,000 Units     sodium bicarbonate tablet 1,300 mg     furosemide (LASIX) tablet 80 mg     senna-docusate (SENOKOT-S;PERICOLACE) 8.6-50 MG per tablet 2 tablet     polyethylene glycol (MIRALAX/GLYCOLAX) Packet 17 g     amLODIPine (NORVASC) tablet 5 mg     atorvastatin (LIPITOR) tablet 40 mg     hydrALAZINE (APRESOLINE) tablet 50 mg     isosorbide mononitrate (IMDUR) 24 hr tablet 60 mg     lisinopril (PRINIVIL/ZESTRIL) tablet 40 mg     loratadine (CLARITIN) tablet 10 mg     metoprolol (TOPROL-XL) 24 hr tablet 100 mg     omeprazole (priLOSEC) CR capsule 20 mg     heparin sodium PF injection 5,000 Units     acetaminophen (TYLENOL) tablet 650 mg     docusate sodium (COLACE) capsule 100 mg     ondansetron (ZOFRAN-ODT) ODT tab 4 mg    Or     ondansetron (ZOFRAN) injection 4 mg     glucose 40 % gel 15-30 g    Or     dextrose 50 % injection 25-50 mL    Or     glucagon injection 1 mg     insulin aspart (NovoLOG) inj (RAPID ACTING)     insulin aspart (NovoLOG) inj (RAPID ACTING)     guaiFENesin (ROBITUSSIN) 20 mg/mL  solution 10 mL     Facility-Administered Medications Ordered in Other Encounters   Medication     bupivacaine 0.25 % - EPINEPHrine 1:200,000 injection       Caring Physician: Matilda Torres MD  [facility], [place]  Pager Contact: see Physician sticky note

## 2017-04-12 NOTE — PROGRESS NOTES
Brief Social Work Note    SW attempted to meet with pt again to discuss the HCD consult.  Pt was sleeping soundly in room.  SW left the HCD form in pt's room for review.  SW will attempt to meet with pt as able to discuss HCD questions/concerns.  Please reconsult if needed.    NIRAV Tyler, APSW  [address]   Pager: [phone]  Phone: [phone]

## 2017-04-12 NOTE — PROGRESS NOTES
Nephrology Progress Note  04/12/2017         Murray Nicholson is a 62 year old year old male admitted on 4/8/17 with dyspnea/cough and found to have volume overload/Heart failure exacerbation with worsening renal function. PMH includes Aortic aneurysm, bicuspid aortic valve, Systolic heart failure, CKD 4/5, T2DM      Assessment & Recommendations:       1. CKD4/5 - Creat beginning to decline with diuresis/improved cardiac function. Creat 5.9 ( 6.2, 6.7, 6.7)  Etiology of CKD is Diabetic Nephropathy. Followed by Dr Brice Caraballo. Baseline creat ~4-5. Has been in the 6's since admission in setting of Severe cardiomyopathy/HF exacerbation.   - RRT of choice is PD. Dr Donovan did d/w Transplant surgery but given severe cardiomyopathy patient is not appropriate candidate for surgery. Will have to pursue as outpt when more medically optimized.   - No current indication for dialysis.    - Continue diuresis  - Avoid nephrotoxins, hypotension  - Renal dose medications  - Continue daily chemistries      2. Volume status - Improved. Edema has resolved. Not on supplemental oxygen. -140/. UO 2105 cc last 24 hrs. Weight was 88.9 kg on 2/15/17. Admission weight 89 kg. Today weight is 84.9 kg.   - Continue diuresis as tolerated  - Continue Furosemide 80 mg po bid  - Continue Metolazone 2.5 mg qd      3. Systolic heart failure/aortic stenosis/aortic aneurysm -   - Echo 4/10 showed worsening of heart failure with EF of 20%  - Current med regimen includes: Hydralazine, Imdur, Lisinopril, Toprol, diuretic  - Cardiology has consulted      4. HTN - Well controlled at this time. -140/. PTA regimen includes: Lasix 80 mg bid, Norvasc 5 mg bid, Hydralaxine 50 mg TID, Lisinopril 40 mg bid, Metoprolol 100 mg qd, Imdur 60 mg qd  - Has intermit cough, likely 2/2 ACE.   - Continue Lisinopril at current dose of 40 mg qd.       5. Electrolytes - No acute concerns.       6. Acid base - Acidosis improving with bicarb replacement. Up to 17  "today. Etiology CKD5.    - Started Na Bicarb 1300 mg TID 4/10      7. BMD - Ca 8.2, Phos 4.6  - Continue Renvela 800 mg TID  - Restarted cholecalciferol 5000 U qd. Vit D level is 17       8. Anemia - Hgb 8.3, down from 10.2 on 3/15/17. No active signs of bleeding. Likely dilutional given hypervolemia.   - Continue Aranesp 60 meq q 14 dys. Last dose 4/4/17. Next dose due 4/18/17.   - Iron studies 4/11/17: Ferritin 1194, Fe 17, Tsat 10%.   - Started iron bid 4/11/17      Recommendations were communicated to primary team via progress note      Seen and discussed with Dr. Venus Cook, NP   [phone]    Interval History :   Creat continues to slowly decline  Remains non oliguric  Major complaint remains right hip pain    Review of Systems:   I reviewed the following systems:  GI: No nausea/vomiting. Has poor appetite   Neuro: no confusion  Constitutional: No fever or chills  CV: Denies dyspnea. Edema has resolved. Intermit. dry cough. No chest pain.  ; Voiding w/o doyle    Physical Exam:   I/O last 3 completed shifts:  In: 1290 [P.O.:1290]  Out: 2130 [Urine:2130]   /75 (BP Location: Left arm)  Pulse 91  Temp 99  F (37.2  C) (Oral)  Resp 16  Ht 1.753 m (5' 9\")  Wt 84.9 kg (187 lb 2 oz)  SpO2 99%  BMI 27.63 kg/m2     GENERAL APPEARANCE: alert, interactive, NAD  EYES: no scleral icterus, pupils equal  PULM: lungs CTA. Breathing is non labored. Not on supplemental oxygen.   CV: mild tachycardia  -edema - no edema  GI: soft, NT, Non distended.   INTEGUMENT: no cyanosis or rash  NEURO: Alert, oriented    Labs:   All labs reviewed by me  Electrolytes/Renal -   Recent Labs   Lab Test  04/12/17   0856  04/11/17   0722  04/10/17   1750   NA  143  141  141   POTASSIUM  3.4  3.7  3.9   CHLORIDE  110*  111*  111*   CO2  17*  15*  16*   BUN  102*  109*  112*   CR  5.91*  6.23*  6.57*   GLC  123*  139*  138*   TRINI  8.2*  8.9  8.7   MAG  1.8  2.0  2.2   PHOS  4.6*  4.8*  5.2*       CBC -   Recent Labs "   Lab Test  04/12/17   0856  04/11/17   1319  04/11/17   0722  04/09/17   0602   WBC  10.7  10.0   --   8.9   HGB  8.3*  8.3*   --   8.4*   PLT  425  392  468*  452*       LFTs -   Recent Labs   Lab Test  04/08/17   1515  03/15/17   1355  01/04/17   1005   04/14/16   0958   08/10/15   0729   ALKPHOS  92   --    --    --   131   --   127   BILITOTAL  0.4   --    --    --   0.5   --   0.6   ALT  18   --    --    --   18   --   29   AST  13   --    --    --   14   --   29   PROTTOTAL  6.9   --    --    --   6.9   --   7.2   ALBUMIN  2.3*  3.2*  3.0*   < >  3.0*   < >  3.4    < > = values in this interval not displayed.       Iron Panel -   Recent Labs   Lab Test  04/11/17   0722  03/15/17   1355  02/15/17   1023   IRON  17*  30*  28*   IRONSAT  10*  13*  13*   ALBERTINA  1194*  860*  432*         Current Medications:    aspirin EC  81 mg Oral Daily     ferrous sulfate  325 mg Oral BID     metolazone  2.5 mg Oral Daily     cholecalciferol  5,000 Units Oral Daily     sodium bicarbonate  1,300 mg Oral TID     furosemide  80 mg Oral BID     senna-docusate  2 tablet Oral At Bedtime     amLODIPine  5 mg Oral At Bedtime     atorvastatin  40 mg Oral QPM     hydrALAZINE  50 mg Oral TID     isosorbide mononitrate  60 mg Oral Daily     lisinopril  40 mg Oral Daily     metoprolol  100 mg Oral Daily     omeprazole  20 mg Oral Daily     heparin  5,000 Units Subcutaneous Q8H     docusate sodium  100 mg Oral BID     insulin aspart  1-7 Units Subcutaneous TID AC     insulin aspart  1-5 Units Subcutaneous At Bedtime        Patricia Cook, LEWIS

## 2017-04-12 NOTE — PLAN OF CARE
"Problem: Cardiac: Heart Failure (Adult)  Goal: Signs and Symptoms of Listed Potential Problems Will be Absent or Manageable (Cardiac: Heart Failure)  Signs and symptoms of listed potential problems will be absent or manageable by discharge/transition of care (reference Cardiac: Heart Failure (Adult) CPG).   Outcome: Improving  D:Reports feeling \"fine\" and that he has increased pain with \"moving or bearing weight on the right\".   During weight bearing patient reports,  \"sharp pain in right hip,  Across right upper thigh and in lower back\".   Denies that pain radiates at all.   Denies numbness or tingling.  Has some mild shortness of breath with exertion.  No acute distress.   Lungs are clear bilaterally.  O2 sat 99% on room air.  Has 1+ edema in the lower extremities bilaterally.   Voiding in adequate amounts.   Cr 5.91 from 6.23 yesterday.    Rhythm is NSR with first degree AVB /BBB, HR 90,  Bp 138/75 MAP 95.       A:Is feeling better. Less short of breath.   Low back, right hip and thigh pain reports as being \"about the same\".   Tolerating activity as far as ambulating to bathroom.   Does experince increased pain with ambulation or bearing weight on right leg.   Adequate urine output.  Cr is slowly trending down.   Rhythm is stable.  Afebrile and vital signs are stable.  Condition is stable.     P:Continue to assess level of comfort.  Up with standby assist.   Encouraged to call for assistance.   Continue to monitor Cr and strict I/O.   Monitor rhythm, temp and vital signs.  Notify MD with any acute changes.      "

## 2017-04-12 NOTE — PLAN OF CARE
Problem: Goal Outcome Summary  Goal: Goal Outcome Summary     Pt alert and oriented.  VSS on room air.  SR.  Pt able to make needs known.  No acute changes.  Continue w/ plan of care and notify team w/ changes/concerns.     Hours cared for [phone]

## 2017-04-12 NOTE — PLAN OF CARE
Problem: Goal Outcome Summary  Goal: Goal Outcome Summary  Pt A&Ox4, VSS, NSR w/ BBB.  MRI on R Hip and Lumbar spine tonight.  The affected areas are causing him great pain upon movement.  Pain controlled with tylenol.  Poor appetite.  Continue to monitor and contact [facility] medicine with concerns.

## 2017-04-12 NOTE — PLAN OF CARE
Problem: Goal Outcome Summary  Goal: Goal Outcome Summary  Outcome: No Change  D: Pt admitted w/ volume overload and CKD with Cr 6.23.  I: C/O pain in the legs and R-groin/hip and took PRN tylenol X2.  Patient is on 2-liter fluid restrict.  A: Patient slept btw cares and AVSS on room air, SR with +2 BLE edema and on diuretics and voiding via bedside urinal spontaneously.  P: Will continue to monitor and notify provider of status change.

## 2017-04-13 ENCOUNTER — PRE VISIT (OUTPATIENT)
Dept: RHEUMATOLOGY | Facility: CLINIC | Age: 62
End: 2017-04-13

## 2017-04-13 ENCOUNTER — APPOINTMENT (OUTPATIENT)
Dept: PHYSICAL THERAPY | Facility: CLINIC | Age: 62
DRG: 291 | End: 2017-04-13
Payer: COMMERCIAL

## 2017-04-13 VITALS
HEART RATE: 91 BPM | BODY MASS INDEX: 27.46 KG/M2 | HEIGHT: 69 IN | SYSTOLIC BLOOD PRESSURE: 112 MMHG | WEIGHT: 185.44 LBS | TEMPERATURE: 98.1 F | DIASTOLIC BLOOD PRESSURE: 62 MMHG | OXYGEN SATURATION: 99 % | RESPIRATION RATE: 16 BRPM

## 2017-04-13 LAB
ALBUMIN SERPL-MCNC: 1.9 G/DL (ref 3.4–5)
ALP SERPL-CCNC: 90 U/L (ref 40–150)
ALT SERPL W P-5'-P-CCNC: 13 U/L (ref 0–70)
ANION GAP SERPL CALCULATED.3IONS-SCNC: 14 MMOL/L (ref 3–14)
AST SERPL W P-5'-P-CCNC: 16 U/L (ref 0–45)
BASOPHILS # BLD AUTO: 0 10E9/L (ref 0–0.2)
BASOPHILS NFR BLD AUTO: 0.1 %
BILIRUB SERPL-MCNC: 0.3 MG/DL (ref 0.2–1.3)
BUN SERPL-MCNC: 113 MG/DL (ref 7–30)
CALCIUM SERPL-MCNC: 8.3 MG/DL (ref 8.5–10.1)
CHLORIDE SERPL-SCNC: 105 MMOL/L (ref 94–109)
CO2 SERPL-SCNC: 18 MMOL/L (ref 20–32)
CREAT SERPL-MCNC: 6.09 MG/DL (ref 0.66–1.25)
CRP SERPL-MCNC: 270 MG/L (ref 0–8)
DIFFERENTIAL METHOD BLD: ABNORMAL
EOSINOPHIL # BLD AUTO: 0 10E9/L (ref 0–0.7)
EOSINOPHIL NFR BLD AUTO: 0.2 %
ERYTHROCYTE [DISTWIDTH] IN BLOOD BY AUTOMATED COUNT: 15.9 % (ref 10–15)
GFR SERPL CREATININE-BSD FRML MDRD: 9 ML/MIN/1.7M2
GLUCOSE BLDC GLUCOMTR-MCNC: 189 MG/DL (ref 70–99)
GLUCOSE BLDC GLUCOMTR-MCNC: 192 MG/DL (ref 70–99)
GLUCOSE SERPL-MCNC: 209 MG/DL (ref 70–99)
HCT VFR BLD AUTO: 26.4 % (ref 40–53)
HGB BLD-MCNC: 8.4 G/DL (ref 13.3–17.7)
IMM GRANULOCYTES # BLD: 0 10E9/L (ref 0–0.4)
IMM GRANULOCYTES NFR BLD: 0.4 %
LYMPHOCYTES # BLD AUTO: 0.3 10E9/L (ref 0.8–5.3)
LYMPHOCYTES NFR BLD AUTO: 3.1 %
MCH RBC QN AUTO: 27.5 PG (ref 26.5–33)
MCHC RBC AUTO-ENTMCNC: 31.8 G/DL (ref 31.5–36.5)
MCV RBC AUTO: 87 FL (ref 78–100)
MONOCYTES # BLD AUTO: 0.1 10E9/L (ref 0–1.3)
MONOCYTES NFR BLD AUTO: 1.1 %
NEUTROPHILS # BLD AUTO: 9.7 10E9/L (ref 1.6–8.3)
NEUTROPHILS NFR BLD AUTO: 95.1 %
NRBC # BLD AUTO: 0 10*3/UL
NRBC BLD AUTO-RTO: 0 /100
PLATELET # BLD AUTO: 423 10E9/L (ref 150–450)
POTASSIUM SERPL-SCNC: 3.7 MMOL/L (ref 3.4–5.3)
PROT SERPL-MCNC: 6.7 G/DL (ref 6.8–8.8)
RBC # BLD AUTO: 3.05 10E12/L (ref 4.4–5.9)
SODIUM SERPL-SCNC: 137 MMOL/L (ref 133–144)
WBC # BLD AUTO: 10.2 10E9/L (ref 4–11)

## 2017-04-13 PROCEDURE — 40000193 ZZH STATISTIC PT WARD VISIT

## 2017-04-13 PROCEDURE — 36415 COLL VENOUS BLD VENIPUNCTURE: CPT | Performed by: FAMILY MEDICINE

## 2017-04-13 PROCEDURE — 25000132 ZZH RX MED GY IP 250 OP 250 PS 637: Performed by: FAMILY MEDICINE

## 2017-04-13 PROCEDURE — 97530 THERAPEUTIC ACTIVITIES: CPT | Mod: GP

## 2017-04-13 PROCEDURE — 97110 THERAPEUTIC EXERCISES: CPT | Mod: GP

## 2017-04-13 PROCEDURE — 85025 COMPLETE CBC W/AUTO DIFF WBC: CPT | Performed by: FAMILY MEDICINE

## 2017-04-13 PROCEDURE — 97116 GAIT TRAINING THERAPY: CPT | Mod: GP

## 2017-04-13 PROCEDURE — 25000128 H RX IP 250 OP 636: Performed by: FAMILY MEDICINE

## 2017-04-13 PROCEDURE — 25000132 ZZH RX MED GY IP 250 OP 250 PS 637: Performed by: INTERNAL MEDICINE

## 2017-04-13 PROCEDURE — 86140 C-REACTIVE PROTEIN: CPT | Performed by: FAMILY MEDICINE

## 2017-04-13 PROCEDURE — 80053 COMPREHEN METABOLIC PANEL: CPT | Performed by: FAMILY MEDICINE

## 2017-04-13 PROCEDURE — 00000146 ZZHCL STATISTIC GLUCOSE BY METER IP

## 2017-04-13 PROCEDURE — 25000125 ZZHC RX 250: Performed by: FAMILY MEDICINE

## 2017-04-13 PROCEDURE — 25000132 ZZH RX MED GY IP 250 OP 250 PS 637

## 2017-04-13 RX ORDER — PREDNISONE 10 MG/1
10 TABLET ORAL DAILY
Qty: 50 TABLET | Refills: 0 | Status: SHIPPED | OUTPATIENT
Start: 2017-04-14 | End: 2017-05-03

## 2017-04-13 RX ORDER — GLIPIZIDE 5 MG/1
5 TABLET ORAL
Status: DISCONTINUED | OUTPATIENT
Start: 2017-04-13 | End: 2017-04-13 | Stop reason: HOSPADM

## 2017-04-13 RX ORDER — LOSARTAN POTASSIUM 100 MG/1
100 TABLET ORAL DAILY
Qty: 30 TABLET | Refills: 0 | Status: SHIPPED | OUTPATIENT
Start: 2017-04-14 | End: 2017-04-28

## 2017-04-13 RX ORDER — LOSARTAN POTASSIUM 50 MG/1
50 TABLET ORAL DAILY
Qty: 30 TABLET | Refills: 0 | Status: SHIPPED | OUTPATIENT
Start: 2017-04-13 | End: 2017-04-13

## 2017-04-13 RX ORDER — POLYETHYLENE GLYCOL 3350 17 G/17G
17 POWDER, FOR SOLUTION ORAL DAILY PRN
Qty: 10 PACKET | Refills: 0 | Status: SHIPPED | OUTPATIENT
Start: 2017-04-13 | End: 2017-05-03

## 2017-04-13 RX ORDER — DOCUSATE SODIUM 100 MG/1
100 CAPSULE, LIQUID FILLED ORAL 2 TIMES DAILY
Qty: 60 CAPSULE | Refills: 1 | Status: SHIPPED | OUTPATIENT
Start: 2017-04-13 | End: 2017-08-02

## 2017-04-13 RX ORDER — ACETAMINOPHEN 325 MG/1
325-650 TABLET ORAL EVERY 4 HOURS PRN
Qty: 100 TABLET | Refills: 0 | Status: SHIPPED | OUTPATIENT
Start: 2017-04-13 | End: 2017-08-02

## 2017-04-13 RX ORDER — SODIUM BICARBONATE 650 MG/1
1300 TABLET ORAL 3 TIMES DAILY
Qty: 90 TABLET | Refills: 0 | Status: SHIPPED | OUTPATIENT
Start: 2017-04-13 | End: 2017-04-28

## 2017-04-13 RX ORDER — METOLAZONE 2.5 MG/1
2.5 TABLET ORAL DAILY
Qty: 30 TABLET | Refills: 0 | Status: SHIPPED | OUTPATIENT
Start: 2017-04-13 | End: 2017-05-06

## 2017-04-13 RX ORDER — GLIPIZIDE 5 MG/1
TABLET ORAL
Qty: 90 TABLET | Refills: 0 | Status: SHIPPED | OUTPATIENT
Start: 2017-04-14 | End: 2017-05-06

## 2017-04-13 RX ORDER — FUROSEMIDE 80 MG
80 TABLET ORAL 2 TIMES DAILY
Qty: 120 TABLET | Refills: 0 | Status: ON HOLD | OUTPATIENT
Start: 2017-04-13 | End: 2017-06-30

## 2017-04-13 RX ORDER — ALLOPURINOL 100 MG/1
50 TABLET ORAL DAILY
Qty: 30 TABLET | Refills: 0 | Status: SHIPPED | OUTPATIENT
Start: 2017-04-13 | End: 2017-05-06

## 2017-04-13 RX ORDER — LOSARTAN POTASSIUM 100 MG/1
100 TABLET ORAL DAILY
Status: DISCONTINUED | OUTPATIENT
Start: 2017-04-14 | End: 2017-04-13 | Stop reason: HOSPADM

## 2017-04-13 RX ORDER — LOSARTAN POTASSIUM 50 MG/1
50 TABLET ORAL DAILY
Status: DISCONTINUED | OUTPATIENT
Start: 2017-04-13 | End: 2017-04-13

## 2017-04-13 RX ORDER — PREDNISONE 20 MG/1
40 TABLET ORAL ONCE
Status: DISCONTINUED | OUTPATIENT
Start: 2017-04-13 | End: 2017-04-13

## 2017-04-13 RX ORDER — FERROUS SULFATE 325(65) MG
325 TABLET ORAL 2 TIMES DAILY
Qty: 100 TABLET | Refills: 0 | Status: SHIPPED | OUTPATIENT
Start: 2017-04-13 | End: 2017-04-21

## 2017-04-13 RX ORDER — PREDNISONE 10 MG/1
10 TABLET ORAL DAILY
Status: DISCONTINUED | OUTPATIENT
Start: 2017-04-14 | End: 2017-04-13 | Stop reason: HOSPADM

## 2017-04-13 RX ADMIN — HYDRALAZINE HYDROCHLORIDE 50 MG: 50 TABLET ORAL at 08:54

## 2017-04-13 RX ADMIN — SODIUM BICARBONATE 650 MG TABLET 1300 MG: at 13:31

## 2017-04-13 RX ADMIN — OMEPRAZOLE 20 MG: 20 CAPSULE, DELAYED RELEASE ORAL at 08:54

## 2017-04-13 RX ADMIN — VITAMIN D, TAB 1000IU (100/BT) 5000 UNITS: 25 TAB at 08:46

## 2017-04-13 RX ADMIN — PREDNISONE 40 MG: 20 TABLET ORAL at 09:05

## 2017-04-13 RX ADMIN — GLIPIZIDE 5 MG: 5 TABLET ORAL at 09:08

## 2017-04-13 RX ADMIN — DOCUSATE SODIUM 100 MG: 100 CAPSULE, LIQUID FILLED ORAL at 08:54

## 2017-04-13 RX ADMIN — ACETAMINOPHEN 650 MG: 325 TABLET, FILM COATED ORAL at 08:45

## 2017-04-13 RX ADMIN — ISOSORBIDE MONONITRATE 60 MG: 60 TABLET, EXTENDED RELEASE ORAL at 08:54

## 2017-04-13 RX ADMIN — LOSARTAN POTASSIUM 50 MG: 50 TABLET, FILM COATED ORAL at 08:45

## 2017-04-13 RX ADMIN — FUROSEMIDE 80 MG: 80 TABLET ORAL at 08:54

## 2017-04-13 RX ADMIN — METOLAZONE 2.5 MG: 2.5 TABLET ORAL at 08:54

## 2017-04-13 RX ADMIN — METOPROLOL SUCCINATE 100 MG: 100 TABLET, FILM COATED, EXTENDED RELEASE ORAL at 08:47

## 2017-04-13 RX ADMIN — HYDRALAZINE HYDROCHLORIDE 50 MG: 50 TABLET ORAL at 03:38

## 2017-04-13 RX ADMIN — INSULIN ASPART 2 UNITS: 100 INJECTION, SOLUTION INTRAVENOUS; SUBCUTANEOUS at 09:01

## 2017-04-13 RX ADMIN — ACETAMINOPHEN 650 MG: 325 TABLET, FILM COATED ORAL at 03:46

## 2017-04-13 RX ADMIN — HEPARIN SODIUM 5000 UNITS: 5000 INJECTION, SOLUTION INTRAVENOUS; SUBCUTANEOUS at 11:27

## 2017-04-13 RX ADMIN — INSULIN ASPART 1 UNITS: 100 INJECTION, SOLUTION INTRAVENOUS; SUBCUTANEOUS at 13:31

## 2017-04-13 RX ADMIN — SODIUM BICARBONATE 650 MG TABLET 1300 MG: at 08:46

## 2017-04-13 RX ADMIN — HEPARIN SODIUM 5000 UNITS: 5000 INJECTION, SOLUTION INTRAVENOUS; SUBCUTANEOUS at 03:38

## 2017-04-13 RX ADMIN — ASPIRIN 81 MG: 81 TABLET, COATED ORAL at 08:54

## 2017-04-13 RX ADMIN — FERROUS SULFATE TAB 325 MG (65 MG ELEMENTAL FE) 325 MG: 325 (65 FE) TAB at 08:53

## 2017-04-13 RX ADMIN — HYDRALAZINE HYDROCHLORIDE 50 MG: 50 TABLET ORAL at 13:31

## 2017-04-13 RX ADMIN — ALLOPURINOL 50 MG: 100 TABLET ORAL at 11:26

## 2017-04-13 ASSESSMENT — PAIN DESCRIPTION - DESCRIPTORS
DESCRIPTORS: ACHING;DISCOMFORT;SORE
DESCRIPTORS: ACHING;DULL
DESCRIPTORS: ACHING;DULL

## 2017-04-13 NOTE — PLAN OF CARE
Problem: Goal Outcome Summary  Goal: Goal Outcome Summary  Outcome: No Change  D: Pt admitted w/ volume overload and CKD with Cr 5.91.  I: C/O pain in the legs and R-groin/hip and took PRN tylenol X2.  Patient was seen by Rheumatology and was started on Prednisone and allopurinol (starting tomorrow).  Patient is on 2-liter fluid restrict and is in compliance.    A: Patient slept btw cares and AVSS on room air, SR with +2 BLE edema and on diuretics and voiding via bedside urinal spontaneously.  P: Will continue to monitor and notify provider of status change.

## 2017-04-13 NOTE — PLAN OF CARE
"Problem: Goal Outcome Summary  Goal: Goal Outcome Summary  PT 6C: Pt ambulated ~300' x2 with no AD and SBA, antalgic gait with pt reporting increased pain at L MTPs and L hip more than on the R. Navigated 6 steps with CGA, pt had more \"twinges\" in L hip that pt said did feel like his leg could give out. Encouraged gentle LE ROM throughout the day (no aggressive stretching) and ambulation/mobility as tolerated in order to avoid symptom exacerbation.     Recommend discharge home with assist from sons as needed and OP Physical Therapy for improved strength, balance, and mobility.      "

## 2017-04-13 NOTE — PROGRESS NOTES
Care Coordinator Progress Note     Admission Date/Time:  4/8/2017  Attending MD:  Guerrero Mcmahon MD     Data  Chart reviewed, discussed with interdisciplinary team.   Patient was admitted for:    Other hypervolemia  Type 2 diabetes mellitus with chronic kidney disease, without long-term current use of insulin, unspecified CKD stage (H)  Renal hypertension, stage 1-4 or unspecified chronic kidney disease  Chronic kidney disease, unspecified  Long term current use of oral hypoglycemic drug  Gout, unspecified cause, unspecified chronicity, unspecified site  Type 2 diabetes mellitus without complication (H)  Renovascular hypertension with goal blood pressure less than 130/80  Iron deficiency anemia, unspecified iron deficiency anemia type  Constipation, unspecified constipation type  Type 2 diabetes mellitus with other specified complication (H)  Congestive heart failure, unspecified congestive heart failure chronicity, unspecified congestive heart failure type (H).      Assessment  Murray was admitted to [facility] on 4/8/2017 and has ongoing difficulty with ambulation and strength.  Pt has progressed to out pt need for PT per PT notes.  RNCC contacted pt to update him on this decision.  No HHC orders placed due to new recs from PT for out pt PT.  No other needs identified at this time.       Out pt appointments scheduled for PCP and Rheumatology.     Plan  Anticipated Discharge Date:  4/13/2017   Anticipated Discharge Plan:  Dc home with out pt PT.      Ally Osborne, TONYCC, BSN    [facility]  [address] [place] [id number]    [email or web address]    Office: [phone] Pager: [phone]

## 2017-04-13 NOTE — TELEPHONE ENCOUNTER
1.  Date/reason for appt: 4/20/17- Arthritis     2.  Referring provider: Kurt F/U @ [facility]     3.  Call to patient (Yes / No - short description): Patient's records are internal.     4.  Previous care at / records requested from:     1. [facility] ED 4/8/17   2. [facility] - Dr. Turcios (PCP)   3. [facility] - Dr. Cross   4. Transplant patient - Recs are in [facility].

## 2017-04-13 NOTE — PROGRESS NOTES
"RHEUMATOLOGY PROGRESS NOTE  Date of Service: 04/13/2017    ASSESSMENT:  # Acute Gouty Arthritis  # Hyperuricemia  # CKD stage 4/5  # HFrEF, LVEF 20-25% (systolic and diastolic)    RECOMMENDATIONS:   - Continue prednisone 40 mg x 5 days total, 20 mg x 5 days total, then continue 10 mg daily until rheumatologic follow-up   - Allopurinol 50 mg daily    - Appreciate change from ACE-I to losartan   - Follow-up in rheumatology clinic in two weeks with Des Burgess NP with repeat LFTs and CBC prior to visit.    - If he develops effusion in the future, joint should be tapped to diagnose crystal-proven gout    The patient was discussed and plan agreed upon with Dr. Gaston.    Colette Cummings MD  PGY-3 Internal Medicine  _______________________________________________________________  S: Started prednisone last night, feeling \"night and day difference\" better. Some pain in left MTPs, but overall pain significantly improving.     O:  Blood pressure 115/62, pulse 91, temperature 98.3  F (36.8  C), temperature source Oral, resp. rate 16, height 1.753 m (5' 9\"), weight 84.1 kg (185 lb 7 oz), SpO2 98 %.    Gen: Alert, NAD  HEENT: NC/AT  CV: RRR, no murmurs  Lungs: CTAB, no wheezes  Abd: Soft, NT, ND  Skin: No rashes or lesions noted  MS: Improved ROM with decreased pain, some pain on internal rotation, tenderness to palpation of right great toe MTP joint, no tenderness to L foot MTP joints, no redness or swelling  Neuro: No focal deficits    Labs and imaging reviewed.    --> 270.        Attestation:  I was present with Dr. Cummings during cueva aspects of history and physical examination, was directly involved in medical decision making and management of this patient, and I agree with the above documentation, findings, and plan.     I discussed the findings and recommendations with the patient.  Recommendations were discussed with the consulting team.  Time spent: 30 minutes    Jennifer Gaston MD, MS  Rheumatology Attending " Physician  [facility] [phone]

## 2017-04-13 NOTE — PROGRESS NOTES
Social Work Services Progress Note    Hospital Day: 6  Date of Initial Social Work Evaluation:  4/12/17  Collaborated with:  Pt, Yoana team    Data:  Pt requesting FMLA form for work.    Intervention:  DINH met with pt to discuss FMLA form needs.  SW also met with Ewa's team to complete form.  Pt will also be given a temporary disability parking application due to hip/gout pain.  Pt asked to review FMLA form and is in agreement with the physician assessment on the form.  Pt in agreement for SW to send discharge summary, when completed to HR department as requested on the FMLA form.  Pt given a copy of the FMLA form for his records.    Assessment:  Pt has family at home to support him in aftercare needs.  Pt is able to get himself to and from appointments as scheduled.    Plan:    Anticipated Disposition:  Home with services    Barriers to d/c plan:  NA, plan to discharge to home this afternoon.    Follow Up:  SW to send hand off letter to clinic team to continue reassessing FMLA needs after clinic follow ups.  Pt aware and in agreement with the hand off.    NIRAV Tyler, APSW  6[address] Unit   Phone: [phone]  Pager: [phone]  Unit: [phone]

## 2017-04-13 NOTE — PROGRESS NOTES
Nephrology Progress Note  04/13/2017         Murray Nicholson is a 62 year old year old male admitted on 4/8/17 with dyspnea/cough and found to have volume overload/Heart failure exacerbation with worsening renal function. PMH includes Aortic aneurysm, bicuspid aortic valve, Systolic heart failure, CKD 4/5, T2DM      Assessment & Recommendations:       1. CKD4/5 - Creat has stabilized in the 6 range. Creat 6.0 today down from peak of 6.9 upon admission following reinstitution of his antihypertensives and diuresis.   Etiology of CKD is Diabetic Nephropathy. Followed by Dr Brice Caraballo. Baseline creat ~4-5. Has been in the 6's since admission in setting of Severe cardiomyopathy/HF exacerbation.   - RRT of choice is PD. Dr Donovan did d/w Transplant surgery but given severe cardiomyopathy patient is not appropriate candidate for surgery. Will have to pursue as outpt when more medically optimized.   - Increase Losartan to 100 mg qd given long standing h/o proteinuria. Was on high dose ACE prior to admission. ( I have ordered)  - No current indication for dialysis.   - Avoid nephrotoxins, hypotension  - Renal dose medications  - Continue daily chemistries      2. Volume status - Improved. Edema has resolved. Not on supplemental oxygen. -130/. UO 1775 cc last 24 hrs. Weight was 88.9 kg on 2/15/17. Admission weight 89 kg. Today weight is 84.1 kg.   - Continue diuresis as tolerated  - Continue Furosemide 80 mg po bid  - Continue Metolazone 2.5 mg qd      3. Systolic heart failure/aortic stenosis/aortic aneurysm -   - Echo 4/10 showed worsening of heart failure with EF of 20%  - Current med regimen includes: Hydralazine, Imdur, Losartan, Toprol, diuretic  - Cardiology has consulted      4. HTN - Well controlled at this time. -130/. PTA regimen includes: Lasix 80 mg bid, Norvasc 5 mg bid, Hydralaxine 50 mg TID, Lisinopril 40 mg bid, Metoprolol 100 mg qd, Imdur 60 mg qd  - Has intermit cough, likely 2/2 ACE.   - ACE  "stopped and Losartan started due to its uricosuric properties . Will increase dose today. Will defer further adjustment to Dr Caraballo as outpt      5. Electrolytes - No acute concerns.       6. Acid base - Acidosis improving with bicarb replacement. Up to 18 today. Etiology CKD5.    - Started Na Bicarb 1300 mg TID 4/10      7. BMD - Ca 8.2, Phos 4.6  - Continue Renvela 800 mg TID  - Restarted cholecalciferol 5000 U qd. Vit D level is 17       8. Anemia - Hgb 8.4, down from 10.2 on 3/15/17. No active signs of bleeding. Likely dilutional given hypervolemia.   - Continue Aranesp 60 meq q 14 dys. Last dose 4/4/17. Next dose due 4/18/17.   - Iron studies 4/11/17: Ferritin 1194, Fe 17, Tsat 10%.   - Started iron bid 4/11/17    9. Gouty arthritis, right hip - UA 15.1, . Started on Prednisone 40 mg qd x 5 dys with taper/allopurinol 50 mg qd and f/u with Rheumatology. Already feeling improved      Recommendations were communicated to primary team directly      Seen and discussed with Dr. Venus Cook, NP   [phone]    Interval History :   Creat remains in the 6 range  Lisinopril switched out to Losartan because it is uricosuric  Has been diagnosed with gouty arthritis in right hip and started on Pred/Allopurinol per Rheumatology with improvement in pain this morning  Reports increase in appetite     Review of Systems:   I reviewed the following systems:  GI: Appetite improved today following improvement in hip pain and initiation of steroids  Neuro: no confusion  Constitutional: No fever or chills  CV: Denies dyspnea. Edema has resolved. Intermit. dry cough. No chest pain.  ; Voiding w/o doyle    Physical Exam:   I/O last 3 completed shifts:  In: 920 [P.O.:920]  Out: 1450 [Urine:1450]   /69 (BP Location: Left arm)  Pulse 91  Temp 98  F (36.7  C) (Oral)  Resp 16  Ht 1.753 m (5' 9\")  Wt 84.1 kg (185 lb 7 oz)  SpO2 98%  BMI 27.38 kg/m2     GENERAL APPEARANCE: alert, interactive, " NAD  EYES: no scleral icterus, pupils equal  PULM: lungs CTA. Breathing is non labored. Not on supplemental oxygen.   CV: mild tachycardia  -edema - no edema  GI: soft, NT, Non distended.   INTEGUMENT: no cyanosis or rash  NEURO: Alert, oriented    Labs:   All labs reviewed by me  Electrolytes/Renal -   Recent Labs   Lab Test  04/13/17   0651  04/12/17   0856  04/11/17   0722  04/10/17   1750   NA  137  143  141  141   POTASSIUM  3.7  3.4  3.7  3.9   CHLORIDE  105  110*  111*  111*   CO2  18*  17*  15*  16*   BUN  113*  102*  109*  112*   CR  6.09*  5.91*  6.23*  6.57*   GLC  209*  123*  139*  138*   TRINI  8.3*  8.2*  8.9  8.7   MAG   --   1.8  2.0  2.2   PHOS   --   4.6*  4.8*  5.2*       CBC -   Recent Labs   Lab Test  04/13/17   0651  04/12/17   0856  04/11/17   1319   WBC  10.2  10.7  10.0   HGB  8.4*  8.3*  8.3*   PLT  423  425  392       LFTs -   Recent Labs   Lab Test  04/13/17   0651  04/08/17   1515  03/15/17   1355   04/14/16   0958   ALKPHOS  90  92   --    --   131   BILITOTAL  0.3  0.4   --    --   0.5   ALT  13  18   --    --   18   AST  16  13   --    --   14   PROTTOTAL  6.7*  6.9   --    --   6.9   ALBUMIN  1.9*  2.3*  3.2*   < >  3.0*    < > = values in this interval not displayed.       Iron Panel -   Recent Labs   Lab Test  04/11/17   0722  03/15/17   1355  02/15/17   1023   IRON  17*  30*  28*   IRONSAT  10*  13*  13*   ALBERTINA  1194*  860*  432*         Current Medications:    glipiZIDE  5 mg Oral QAM AC     [START ON 4/14/2017] losartan  100 mg Oral Daily     predniSONE  40 mg Oral Q24H     allopurinol  50 mg Oral Daily     aspirin EC  81 mg Oral Daily     ferrous sulfate  325 mg Oral BID     metolazone  2.5 mg Oral Daily     cholecalciferol  5,000 Units Oral Daily     sodium bicarbonate  1,300 mg Oral TID     furosemide  80 mg Oral BID     senna-docusate  2 tablet Oral At Bedtime     amLODIPine  5 mg Oral At Bedtime     atorvastatin  40 mg Oral QPM     hydrALAZINE  50 mg Oral TID     isosorbide  mononitrate  60 mg Oral Daily     metoprolol  100 mg Oral Daily     omeprazole  20 mg Oral Daily     heparin  5,000 Units Subcutaneous Q8H     docusate sodium  100 mg Oral BID     insulin aspart  1-7 Units Subcutaneous TID AC     insulin aspart  1-5 Units Subcutaneous At Bedtime        Patricia Cook, NP

## 2017-04-13 NOTE — PLAN OF CARE
"Problem: Cardiac: Heart Failure (Adult)  Goal: Signs and Symptoms of Listed Potential Problems Will be Absent or Manageable (Cardiac: Heart Failure)  Signs and symptoms of listed potential problems will be absent or manageable by discharge/transition of care (reference Cardiac: Heart Failure (Adult) CPG).   D:Patient reports feeling \"a lot better\" and that he is \"more mobile\".   Out  Of bed sitting in chair since breakfast reading a book.   Also, out walking in halls with physical therapist.  Patient indicated he walked a \"lot further and with out a walker\".   Reports more of a \"dull ache\" than a sharp pain in right hip, thigh and right lower back.   Medicated once this shift with plain tylenol.   Denies chest pain.   Denies shortness of breath.   Lungs are clear bilaterally.  Has 2+ edema in the lower extremities bilaterally.  Is voiding in adequate amounts.  Cr 6.09 .  Rhythm is NSR with no ectopy.  Bp 110/69 MAP 88. O2 sat 98% on room air.     A:Is doing well.  Mobility is improved.  Pain level is down.  Feels ready to discharge to home.   Feels \"the medication regime they have me on is working. The prednisone and allopurinol\".    Lungs sound good.  O2 sats are normal.   Rhythm is stable.  Afebrile and VSS.   Condition is improved and adequate to discharge to home.        P:Discharge to home early evening.  Patient stated he contacted \"my son\" and he is \"15 minutes away and will pick me up after work\".   Continue to monitor  And assess status.        "

## 2017-04-14 ENCOUNTER — CARE COORDINATION (OUTPATIENT)
Dept: CARDIOLOGY | Facility: CLINIC | Age: 62
End: 2017-04-14

## 2017-04-14 ENCOUNTER — TELEPHONE (OUTPATIENT)
Dept: PHARMACY | Facility: OTHER | Age: 62
End: 2017-04-14

## 2017-04-14 ENCOUNTER — TELEPHONE (OUTPATIENT)
Dept: FAMILY MEDICINE | Facility: CLINIC | Age: 62
End: 2017-04-14

## 2017-04-14 ENCOUNTER — CARE COORDINATION (OUTPATIENT)
Dept: NEPHROLOGY | Facility: CLINIC | Age: 62
End: 2017-04-14

## 2017-04-14 DIAGNOSIS — N18.5 CKD (CHRONIC KIDNEY DISEASE) STAGE 5, GFR LESS THAN 15 ML/MIN (H): Primary | ICD-10-CM

## 2017-04-14 NOTE — PLAN OF CARE
Problem: Goal Outcome Summary  Goal: Goal Outcome Summary  Physical Therapy Discharge Summary     Reason for therapy discharge:    Discharged Home     Progress towards therapy goal(s). See goals on Care Plan in [facility] electronic health record for goal details.  Goals Partially Met. Barriers to achieving goals: pain, discharge from hospital.     Therapy recommendation(s):   Continue HEP and OP PT for improved strength, ROM, and functional mobility.

## 2017-04-14 NOTE — NURSING NOTE
Labs per clinic 2A protocol.  Follow up/CKD 5  Last OV: post hospital Discharge  Vickie Bryson LPN  Nephrology  [facility]  [phone]

## 2017-04-14 NOTE — PROGRESS NOTES
Patient is seen at [facility] so they will handle the patient's post discharge follow up              [facility]  Discharge Summary     Murray Nicholson MRN# [id number]   Age: 62 year old YOB: 1955      Date of Admission:  4/8/2017  Date of Discharge:  4/13/2017  Admitting Physician:  Km Muniz MD  Discharge Physician:  Km Muniz MD Contact: [phone]  Discharging Service:  [facility]

## 2017-04-14 NOTE — TELEPHONE ENCOUNTER
Please call for In Patient follow up  Chief Complaint: Gout, Unspecified Cause, Unspecified Chronicity, Unspecified Site, Other Hypervolemia

## 2017-04-14 NOTE — PROGRESS NOTES
Reason for Call    Followed up with patient after hospitalization. States he's doing well today. Does have some ongoing discomfort in his leg related to gout, but said it is improved. BP today was 119/79 and weight is stable at 186.8lbs. No concerns with shortness of breath at this time, and otherwise denied symptoms. Scheduled for follow up next week.      Plan    1. Follow up in clinic as scheduled on 4/19  2. Call if any change in symptoms    Patient was given an opportunity to ask questions and have those questions answered to his satisfaction.  Patient verbalized understanding of instructions provided and agreed to plan of care.    Carla Kyle RN

## 2017-04-14 NOTE — TELEPHONE ENCOUNTER
MTM referral from: Transitions of Care (recent hospital discharge or ED visit)    MTM referral outreach attempt #1 on April 14, 2017 at 10:27 AM      Outcome: Left Message    Juliana Abel MTM Coordinator

## 2017-04-17 ENCOUNTER — TELEPHONE (OUTPATIENT)
Dept: PHARMACY | Facility: CLINIC | Age: 62
End: 2017-04-17

## 2017-04-17 NOTE — TELEPHONE ENCOUNTER
ED / Discharge Outreach Protocol    Patient Contact    Attempt # 2    Was call answered?  No.  Left message on voicemail with information to call me back.    Myriam Clinton RN

## 2017-04-18 DIAGNOSIS — I10 HYPERTENSION GOAL BP (BLOOD PRESSURE) < 130/80: ICD-10-CM

## 2017-04-18 LAB
BACTERIA SPEC CULT: NO GROWTH
BACTERIA SPEC CULT: NO GROWTH
MICRO REPORT STATUS: NORMAL
MICRO REPORT STATUS: NORMAL
SPECIMEN SOURCE: NORMAL
SPECIMEN SOURCE: NORMAL

## 2017-04-18 RX ORDER — POTASSIUM CHLORIDE 1500 MG/1
20 TABLET, EXTENDED RELEASE ORAL DAILY
Qty: 90 TABLET | Refills: 3 | OUTPATIENT
Start: 2017-04-18

## 2017-04-18 NOTE — TELEPHONE ENCOUNTER
I don't know if still recommended to take this medication. Probably we should get input of his kidney specialist.     Yahir Turcios

## 2017-04-18 NOTE — TELEPHONE ENCOUNTER
POTASSIUM CL 20 MEQ      Last Written Prescription Date: NOT ON CURRENT MED LIST   Last Fill Quantity: 0, # refills: 0  Last Office Visit with [facility], [facility] or [facility] prescribing provider: 2-15-17       Potassium   Date Value Ref Range Status   04/13/2017 3.7 3.4 - 5.3 mmol/L Final     Creatinine   Date Value Ref Range Status   04/13/2017 6.09 (H) 0.66 - 1.25 mg/dL Final     BP Readings from Last 3 Encounters:   04/13/17 112/62   03/20/17 146/73   03/15/17 153/70

## 2017-04-18 NOTE — TELEPHONE ENCOUNTER
Routing refill request to provider for review/approval because:  Drug not active on patient's medication list  A break in medication  Looks like this was discharged at hospital

## 2017-04-19 ENCOUNTER — OFFICE VISIT (OUTPATIENT)
Dept: NEPHROLOGY | Facility: CLINIC | Age: 62
End: 2017-04-19
Attending: INTERNAL MEDICINE
Payer: COMMERCIAL

## 2017-04-19 ENCOUNTER — TELEPHONE (OUTPATIENT)
Dept: PHARMACY | Facility: CLINIC | Age: 62
End: 2017-04-19

## 2017-04-19 VITALS
OXYGEN SATURATION: 100 % | DIASTOLIC BLOOD PRESSURE: 81 MMHG | WEIGHT: 192.2 LBS | BODY MASS INDEX: 28.47 KG/M2 | TEMPERATURE: 98 F | HEART RATE: 80 BPM | SYSTOLIC BLOOD PRESSURE: 147 MMHG | HEIGHT: 69 IN

## 2017-04-19 DIAGNOSIS — N18.5 CKD (CHRONIC KIDNEY DISEASE) STAGE 5, GFR LESS THAN 15 ML/MIN (H): ICD-10-CM

## 2017-04-19 DIAGNOSIS — N18.5 ANEMIA IN STAGE 5 CHRONIC KIDNEY DISEASE (H): ICD-10-CM

## 2017-04-19 DIAGNOSIS — M10.9 ACUTE GOUTY ARTHRITIS: ICD-10-CM

## 2017-04-19 DIAGNOSIS — D63.1 ANEMIA IN STAGE 5 CHRONIC KIDNEY DISEASE (H): ICD-10-CM

## 2017-04-19 DIAGNOSIS — N18.5 CKD (CHRONIC KIDNEY DISEASE) STAGE 5, GFR LESS THAN 15 ML/MIN (H): Primary | ICD-10-CM

## 2017-04-19 LAB
ALBUMIN SERPL-MCNC: 2.4 G/DL (ref 3.4–5)
ANION GAP SERPL CALCULATED.3IONS-SCNC: 14 MMOL/L (ref 3–14)
BASOPHILS # BLD AUTO: 0 10E9/L (ref 0–0.2)
BASOPHILS NFR BLD AUTO: 0.1 %
BUN SERPL-MCNC: 171 MG/DL (ref 7–30)
CALCIUM SERPL-MCNC: 8.5 MG/DL (ref 8.5–10.1)
CHLORIDE SERPL-SCNC: 93 MMOL/L (ref 94–109)
CO2 SERPL-SCNC: 26 MMOL/L (ref 20–32)
CREAT SERPL-MCNC: 6.04 MG/DL (ref 0.66–1.25)
CREAT UR-MCNC: 42 MG/DL
DIFFERENTIAL METHOD BLD: ABNORMAL
EOSINOPHIL # BLD AUTO: 0 10E9/L (ref 0–0.7)
EOSINOPHIL NFR BLD AUTO: 0.1 %
ERYTHROCYTE [DISTWIDTH] IN BLOOD BY AUTOMATED COUNT: 16.1 % (ref 10–15)
FERRITIN SERPL-MCNC: 1509 NG/ML (ref 26–388)
GFR SERPL CREATININE-BSD FRML MDRD: 9 ML/MIN/1.7M2
GLUCOSE SERPL-MCNC: 224 MG/DL (ref 70–99)
HCT VFR BLD AUTO: 27.5 % (ref 40–53)
HCT VFR BLD AUTO: 27.7 % (ref 40–53)
HGB BLD-MCNC: 9.1 G/DL (ref 13.3–17.7)
HGB BLD-MCNC: 9.1 G/DL (ref 13.3–17.7)
IMM GRANULOCYTES # BLD: 0.3 10E9/L (ref 0–0.4)
IMM GRANULOCYTES NFR BLD: 2.2 %
IRON SATN MFR SERPL: 30 % (ref 15–46)
IRON SERPL-MCNC: 63 UG/DL (ref 35–180)
LYMPHOCYTES # BLD AUTO: 0.5 10E9/L (ref 0.8–5.3)
LYMPHOCYTES NFR BLD AUTO: 3.6 %
MCH RBC QN AUTO: 29.2 PG (ref 26.5–33)
MCHC RBC AUTO-ENTMCNC: 32.9 G/DL (ref 31.5–36.5)
MCV RBC AUTO: 89 FL (ref 78–100)
MONOCYTES # BLD AUTO: 1.1 10E9/L (ref 0–1.3)
MONOCYTES NFR BLD AUTO: 7.7 %
NEUTROPHILS # BLD AUTO: 12.5 10E9/L (ref 1.6–8.3)
NEUTROPHILS NFR BLD AUTO: 86.3 %
NRBC # BLD AUTO: 0 10*3/UL
NRBC BLD AUTO-RTO: 0 /100
PHOSPHATE SERPL-MCNC: 4.1 MG/DL (ref 2.5–4.5)
PLATELET # BLD AUTO: 487 10E9/L (ref 150–450)
POTASSIUM SERPL-SCNC: 4.3 MMOL/L (ref 3.4–5.3)
PROT UR-MCNC: 0.39 G/L
PROT/CREAT 24H UR: 0.93 G/G CR (ref 0–0.2)
RBC # BLD AUTO: 3.12 10E12/L (ref 4.4–5.9)
SODIUM SERPL-SCNC: 134 MMOL/L (ref 133–144)
TIBC SERPL-MCNC: 213 UG/DL (ref 240–430)
WBC # BLD AUTO: 14.4 10E9/L (ref 4–11)

## 2017-04-19 PROCEDURE — 96372 THER/PROPH/DIAG INJ SC/IM: CPT

## 2017-04-19 PROCEDURE — 63400005 ZZH RX 634: Mod: ZF | Performed by: INTERNAL MEDICINE

## 2017-04-19 PROCEDURE — 99213 OFFICE O/P EST LOW 20 MIN: CPT | Mod: ZF

## 2017-04-19 PROCEDURE — 85025 COMPLETE CBC W/AUTO DIFF WBC: CPT

## 2017-04-19 RX ORDER — AMLODIPINE BESYLATE 5 MG/1
5 TABLET ORAL 2 TIMES DAILY
Refills: 3 | COMMUNITY
Start: 2017-03-31 | End: 2017-05-03

## 2017-04-19 RX ORDER — POTASSIUM CHLORIDE 1500 MG/1
20 TABLET, EXTENDED RELEASE ORAL DAILY
Refills: 3 | COMMUNITY
Start: 2017-02-17 | End: 2017-04-28

## 2017-04-19 RX ADMIN — DARBEPOETIN ALFA 100 MCG: 100 INJECTION, SOLUTION INTRAVENOUS; SUBCUTANEOUS at 15:37

## 2017-04-19 ASSESSMENT — PAIN SCALES - GENERAL: PAINLEVEL: MILD PAIN (3)

## 2017-04-19 NOTE — MR AVS SNAPSHOT
After Visit Summary   4/19/2017    Murray Nicholson    MRN: [id number]           Patient Information     Date Of Birth          1955        Visit Information        Provider Department      4/19/2017 3:30 PM Patricia Cook NP [facility] Nephrology        Today's Diagnoses     Anemia in stage 5 chronic kidney disease (H)    -  1    CKD (chronic kidney disease) stage 5, GFR less than 15 ml/min (H)        Acute gouty arthritis          Care Instructions    1. Call Cardiology and schedule post hospital follow up, decline in EF.   2. Complete Prednisone taper, 20 mg daily x 5, 10 mg daily x 5, then d/c  3. Continue Allopurinol  4. Continue daily blood pressures/weights  5. Bring in blood pressure monitor next vist  6. Return to clinic 2 wks with labs prior        Follow-ups after your visit        Follow-up notes from your care team     Return in about 2 weeks (around 5/3/2017).      Your next 10 appointments already scheduled     Apr 20, 2017  1:00 PM CDT   (Arrive by 12:45 PM)   New Patient Visit with VERONICA Awan CNP   [facility] Rheumatology ([facility])    [address] [place] [id number]   [phone]            Apr 24, 2017 11:00 AM CDT   Evaluation with Floresita Covington, PT   [facility] [place], Physical Therapy - Outpatient ([facility])    [address], [address]  [place] [address]   [phone]            May 03, 2017  1:00 PM CDT   Lab with LOS LAB   [facility] Lab ([facility])    [address] [place] [id number]   [phone]            May 03, 2017  1:30 PM CDT   (Arrive by 1:15 PM)   INJECTION with  Txc Nurse   [facility] Solid Organ Transplant ([facility])    [address] [place] [address]   [phone]            May 03, 2017  2:00 PM CDT   (Arrive by 1:30 PM)   Return Visit  with Patricia Cook NP   [facility] Nephrology ([facility])    [address]  [address] [facility] [id number] [phone]            Jul 12, 2017 12:00 PM CDT   Lab with  LAB   [facility] Lab ([facility])    [address]  [address] [facility] [address]   [phone]            Jul 12, 2017  1:00 PM CDT   (Arrive by 12:30 PM)   Return Visit with Brice Caraballo MD   [facility] Nephrology ([facility])    [address] [place] [id number] [phone]              Future tests that were ordered for you today     Open Future Orders        Priority Expected Expires Ordered    Basic metabolic panel Routine  5/19/2017 4/19/2017    Uric acid Routine  5/19/2017 4/19/2017            Who to contact     If you have questions or need follow up information about today's clinic visit or your schedule please contact [facility] NEPHROLOGY directly at [phone].  Normal or non-critical lab and imaging results will be communicated to you by Linkuah[employer], letter or phone within 4 business days after the clinic has received the results. If you do not hear from us within 7 days, please contact the clinic through [employer] or phone. If you have a critical or abnormal lab result, we will notify you by phone as soon as possible.  Submit refill requests through [employer] or call your pharmacy and they will forward the refill request to us. Please allow 3 business days for your refill to be completed.          Additional Information About Your Visit        [employer] Information     [employer] gives you secure access to your electronic health record. If you see a primary care provider, you can also send messages to your care team and make appointments. If you have questions, please call your primary care clinic.  If you do not have a primary care provider, please call [phone] and they will assist you.        Care  "EveryWhere ID     This is your Care EveryWhere ID. This could be used by other organizations to access your [place] medical records  [id number]        Your Vitals Were     Pulse Temperature Height Pulse Oximetry BMI (Body Mass Index)       80 98  F (36.7  C) (Oral) 1.753 m (5' 9\") 100% 28.38 kg/m2        Blood Pressure from Last 3 Encounters:   04/19/17 147/81   04/13/17 112/62   03/20/17 146/73    Weight from Last 3 Encounters:   04/19/17 87.2 kg (192 lb 3.2 oz)   04/13/17 84.1 kg (185 lb 7 oz)   03/15/17 89.8 kg (198 lb)              We Performed the Following     CBC with platelets and differential     Hematocrit     Hemoglobin     Treatment Conditions     Vital signs          Today's Medication Changes          These changes are accurate as of: 4/19/17  4:14 PM.  If you have any questions, ask your nurse or doctor.               These medicines have changed or have updated prescriptions.        Dose/Directions    atorvastatin 40 MG tablet   Commonly known as:  LIPITOR   This may have changed:  when to take this   Used for:  CKD (chronic kidney disease) stage 3, GFR 30-59 ml/min, Hyperlipidemia LDL goal <100        Dose:  40 mg   Take 1 tablet (40 mg) by mouth daily   Quantity:  90 tablet   Refills:  1                Primary Care Provider Office Phone # Fax #    aYhir Turcios [phone]       [address]  [facility] [address]        Thank you!     Thank you for choosing [facility] NEPHROLOGY  for your care. Our goal is always to provide you with excellent care. Hearing back from our patients is one way we can continue to improve our services. Please take a few minutes to complete the written survey that you may receive in the mail after your visit with us. Thank you!             Your Updated Medication List - Protect others around you: Learn how to safely use, store and throw away your medicines at www.disposemymeds.org.          This list is accurate as of: 4/19/17  " 4:14 PM.  Always use your most recent med list.                   Brand Name Dispense Instructions for use    acetaminophen 325 MG tablet    TYLENOL    100 tablet    Take 1-2 tablets (325-650 mg) by mouth every 4 hours as needed for mild pain Do not take more than 10 tablets in 24 hours       albuterol 108 (90 BASE) MCG/ACT Inhaler    PROAIR HFA/PROVENTIL HFA/VENTOLIN HFA    1 Inhaler    Inhale 2 puffs into the lungs every 6 hours as needed for shortness of breath / dyspnea or wheezing       allopurinol 100 MG tablet    ZYLOPRIM    30 tablet    Take 0.5 tablets (50 mg) by mouth daily       amLODIPine 5 MG tablet    NORVASC     Take 5 mg by mouth 2 times daily       aspirin 81 MG tablet      Take 1 tablet (81 mg) by mouth at bedtime       atorvastatin 40 MG tablet    LIPITOR    90 tablet    Take 1 tablet (40 mg) by mouth daily       azelastine-fluticasone 137-50 MCG/ACT nasal spray    DYMISTA    23 g    Spray 1 spray into both nostrils 2 times daily       darbepoetin emily 100 MCG/0.5ML injection    ARANESP (ALBUMIN FREE)    0.5 mL    Inject 0.5 mLs (100 mcg) Subcutaneous every 14 days As needed for hgb<10g/dL.  If Hgb increases >1 point in 2 weeks (if blood transfusion given, use hgb PRIOR to this), SYSTOLIC BP > 180 mmHg or hgb>=10g/dL, HOLD DOSE. Dose must be within 1 week of Hgb.  Per anemia protocol with Brice Caraballo MD       docusate sodium 100 MG capsule    COLACE    60 capsule    Take 1 capsule (100 mg) by mouth 2 times daily       ferrous sulfate 325 (65 FE) MG tablet    IRON    100 tablet    Take 1 tablet (325 mg) by mouth 2 times daily       furosemide 80 MG tablet    LASIX    120 tablet    Take 1 tablet (80 mg) by mouth 2 times daily In the morning and at 3 PM       glipiZIDE 5 MG tablet    GLUCOTROL    90 tablet    Take 1 tablet by mouth. TAKE 1 TABLET BY MOUTH DAILY BEFORE A MEAL       hydrALAZINE 50 MG tablet    APRESOLINE    270 tablet    Take 1 tablet (50 mg) by mouth 3 times daily       isosorbide  mononitrate 60 MG 24 hr tablet    IMDUR    90 tablet    Take 1 tablet (60 mg) by mouth daily       loratadine 10 MG tablet    CLARITIN     Take 10 mg by mouth daily as needed       losartan 100 MG tablet    COZAAR    30 tablet    Take 1 tablet (100 mg) by mouth daily       metolazone 2.5 MG tablet    ZAROXOLYN    30 tablet    Take 1 tablet (2.5 mg) by mouth daily       metoprolol 100 MG 24 hr tablet    TOPROL XL    90 tablet    Take 1 tablet (100 mg) by mouth daily       omeprazole 20 MG CR capsule    priLOSEC     Take 20 mg by mouth daily       polyethylene glycol Packet    MIRALAX/GLYCOLAX    10 packet    Take 17 g by mouth daily as needed for constipation       potassium chloride SA 20 MEQ CR tablet    K-DUR/KLOR-CON M     Take 20 mEq by mouth daily       predniSONE 10 MG tablet    DELTASONE    50 tablet    Take 1 tablet (10 mg) by mouth daily Take 4 tablets for 4 days (40 mg/day), followed by 2 tablets for 5 days (20 mg/day), followed by 1 tablet daily (10 mg/day)       sodium bicarbonate 650 MG tablet     90 tablet    Take 2 tablets (1,300 mg) by mouth 3 times daily       VITAMIN D3 PO      Take 5,000 Units by mouth daily

## 2017-04-19 NOTE — NURSING NOTE
Frequency: 14 days  Most recent or today's HGB: 9.1   Date: 17  Date of lat dose: .  HGB associated with last dose given: 8.3    Blood Pressure:147/82    Diagnosis: anemia in ckd    Ordered by: louann Caraballo MD  VIS Offered: yes    Double Checked by: isai wolf    See MAR for administration details    Pt's first name, last name and  verified prior to medication administration, injection given without complications or questions.

## 2017-04-19 NOTE — NURSING NOTE
"Chief Complaint   Patient presents with     RECHECK     Follow up CKD stage 5.       Initial /81  Pulse 80  Temp 98  F (36.7  C) (Oral)  Ht 1.753 m (5' 9\")  Wt 87.2 kg (192 lb 3.2 oz)  SpO2 100%  BMI 28.38 kg/m2 Estimated body mass index is 28.38 kg/(m^2) as calculated from the following:    Height as of this encounter: 1.753 m (5' 9\").    Weight as of this encounter: 87.2 kg (192 lb 3.2 oz).  Medication Reconciliation: complete   Kyleigh Magaña. CMA    "

## 2017-04-19 NOTE — LETTER
4/19/2017      RE: Murray Nicholson  [address]  [place] [address]       Nephrology Clinic Visit 4/19/17    Assessment and Plan:     1. CKD5 - Creat has stabilized in the 6 range. Creat 6.0 today down from peak of 6.9 on 4/8/17 following reinstitution of his antihypertensives and diuresis.   Etiology of CKD is Diabetic Nephropathy. Followed by Dr Brice Caraballo. Baseline creat had been in the ~4-5. Has been in the 6's since admission in setting of Severe cardiomyopathy/HF exacerbation.   - RRT of choice is PD. Dr Donovan did d/w Transplant surgery but given severe cardiomyopathy patient is not appropriate candidate for surgery. Will have to pursue as outpt when more medically optimized.   - No current indication for dialysis.   - BUN is very elevated given steroids  - Urine protein 0.93 g/gCr. Improved from 1.7 g/gCr 5/15)  - Reinforced need for uninterrupted medication administration      2. Volume status - Improved. Edema has resolved. No dyspnea/cough. Home -130/. He has not brought in his monitor to have it checked for accuracy. Clinic blood pressures today 150/80. Weight today 87 kg. Admission weight 89 kg. Weight on 4/13/17 84.1 kg.   - Continue Furosemide 80 mg po bid  - Continue Metolazone 2.5 mg qd      3. Systolic heart failure/aortic stenosis/aortic aneurysm -   - Echo 4/10 showed worsening of heart failure with EF of 20%  - Current med regimen includes: Hydralazine, Imdur, Losartan, Toprol, diuretic  - Cardiology consulted as inpatient  - Needs to return to his primary Cardiologist for hospital f/u. He will not be able to proceed with PD catheter placement, transplant evaluation w/o clearance from Cards      4. HTN - Questionable control today. Home blood pressures reportedly well controlled.    - Continue current regimen: Lasix 80 mg bid, Norvasc 5 mg bid, Hydralaxine 50 mg TID, Losartan 100 mg qd, Metolazone 2.5 mg qd, Metoprolol 100 mg qd, Imdur 60 mg qd  - ACE stopped and Losartan  started due to its uricosuric properties .       5. Electrolytes - No acute concerns.       6. Acid base - Acidosis improving with bicarb replacement. Up to 18 today. Etiology CKD5.   - Started Na Bicarb 1300 mg TID 4/10      7. BMD - Ca 8.5, Phos 4.1, Vit D level is 17   - Continue Renvela 800 mg TID  - Continue Cholecalciferol 5000 U qd.       8. Anemia - Hgb 9.1.    - Continue Aranesp 60 meq q 14 dys. Given dose today.    - Iron studies 4/19/17: Ferritin 1509, Fe 63, Tsat 30%.   - Started iron bid 4/11/17. Can discontinue at next visit     9. Gouty arthritis, right hip - UA 15.1,  during hospital admission. Started on Prednisone 40 mg qd x 5 dys. Beginning taper today with 20 mg qd x 5 dy, 10 mg qd x 5 dys, then d/c. He continues on allopurinol 50 mg qd. Feeling improved   - Will check UA next visit    10. Disposition - Patient will f/u in clinic in 2 wks with labs prior    Assessment and plan was discussed with patient and he voiced his understanding and agreement.    Reason for Visit:  Post hospital CKD5, HTN follow up    HPI:  Murray Nicholson is a 62 year old year old male admitted on 4/8/17 with dyspnea/cough and found to have volume overload/Heart failure exacerbation with worsening renal function in setting of drug holiday. PMH includes Aortic aneurysm, bicuspid aortic valve, Systolic heart failure, CKD 4/5, T2DM. Creat peaked at 6.9 and declined to 6.0 on day of discharge. He lost 5 kg through diuresis. Blood pressures were in the 120-130/ range. He was also diagnosed with acute gout in right hip and started on Prednisone.      Home BP: 120-130/    Baseline Cr: 4-5    ROS:  Patient notes that his right hip pain is much improved. He just decreased his Prednisone to 20 mg qd today from 40 mg qd the previous 5 dys. Home blood pressures look good. No edema. Beginning PT this week. Has not yet returned to work. Denies CP, dyspnea. His cough has resolved off the ACE. No difficulty voiding. Appetite is good.      Active Medical Problems:    CKD5  HTN  Systolic Heart Failure  Gout  GERD  HLD  Anemia of chronic disease  Tubular adenoma  MGUS  T2DM    Personal Hx:   Single, employed in clothing sales, [place]. Son lives near patient and is helpful    Allergies:  Allergies   Allergen Reactions     Cats      Throat tightness     Penicillins Hives     Seasonal Allergies      rhinitis     Shrimp      Throat closes        Medications:  Prior to Admission medications    Medication Sig Start Date End Date Taking? Authorizing Provider   amLODIPine (NORVASC) 5 MG tablet Take 5 mg by mouth 2 times daily 3/31/17  Yes Reported, Patient   potassium chloride SA (K-DUR/KLOR-CON M) 20 MEQ CR tablet Take 20 mEq by mouth daily 2/17/17  Yes Reported, Patient   acetaminophen (TYLENOL) 325 MG tablet Take 1-2 tablets (325-650 mg) by mouth every 4 hours as needed for mild pain Do not take more than 10 tablets in 24 hours 4/13/17  Yes Km Muniz MD   sodium bicarbonate 650 MG tablet Take 2 tablets (1,300 mg) by mouth 3 times daily 4/13/17  Yes Km Muniz MD   metolazone (ZAROXOLYN) 2.5 MG tablet Take 1 tablet (2.5 mg) by mouth daily 4/13/17  Yes Km Muniz MD   glipiZIDE (GLUCOTROL) 5 MG tablet Take 1 tablet by mouth. TAKE 1 TABLET BY MOUTH DAILY BEFORE A MEAL 4/14/17  Yes Km Muniz MD   losartan (COZAAR) 100 MG tablet Take 1 tablet (100 mg) by mouth daily 4/14/17  Yes Km Muniz MD   furosemide (LASIX) 80 MG tablet Take 1 tablet (80 mg) by mouth 2 times daily In the morning and at 3 PM 4/13/17  Yes Km Muniz MD   allopurinol (ZYLOPRIM) 100 MG tablet Take 0.5 tablets (50 mg) by mouth daily 4/13/17  Yes Km Muniz MD   ferrous sulfate (IRON) 325 (65 FE) MG tablet Take 1 tablet (325 mg) by mouth 2 times daily 4/13/17  Yes Km Muniz MD   docusate sodium (COLACE) 100 MG capsule Take 1 capsule (100 mg) by mouth 2 times daily 4/13/17  Yes Km Muniz MD   polyethylene glycol  (MIRALAX/GLYCOLAX) Packet Take 17 g by mouth daily as needed for constipation 4/13/17  Yes Km Muniz MD   predniSONE (DELTASONE) 10 MG tablet Take 1 tablet (10 mg) by mouth daily Take 4 tablets for 4 days (40 mg/day), followed by 2 tablets for 5 days (20 mg/day), followed by 1 tablet daily (10 mg/day) 4/14/17  Yes Km Muniz MD   omeprazole (PRILOSEC) 20 MG CR capsule Take 20 mg by mouth daily   Yes Unknown, Entered By History   darbepoetin emily (ARANESP, ALBUMIN FREE,) 100 MCG/0.5ML injection Inject 0.5 mLs (100 mcg) Subcutaneous every 14 days As needed for hgb<10g/dL.  If Hgb increases >1 point in 2 weeks (if blood transfusion given, use hgb PRIOR to this), SYSTOLIC BP > 180 mmHg or hgb>=10g/dL, HOLD DOSE. Dose must be within 1 week of Hgb.  Per anemia protocol with Brice Caraballo MD 4/5/17  Yes Brice Caraballo MD   albuterol (PROAIR HFA/PROVENTIL HFA/VENTOLIN HFA) 108 (90 BASE) MCG/ACT Inhaler Inhale 2 puffs into the lungs every 6 hours as needed for shortness of breath / dyspnea or wheezing 12/1/16  Yes Amelie Cross PA-C   atorvastatin (LIPITOR) 40 MG tablet Take 1 tablet (40 mg) by mouth daily  Patient taking differently: Take 40 mg by mouth every evening  10/21/16  Yes Gilles Valentine MD   hydrALAZINE (APRESOLINE) 50 MG tablet Take 1 tablet (50 mg) by mouth 3 times daily 6/14/16  Yes Yahir Turcios MD   isosorbide mononitrate (IMDUR) 60 MG 24 hr tablet Take 1 tablet (60 mg) by mouth daily 5/9/16  Yes Yahir Turcios MD   metoprolol (TOPROL XL) 100 MG 24 hr tablet Take 1 tablet (100 mg) by mouth daily 5/9/16  Yes Yahir Turcios MD   azelastine-fluticasone (DYMISTA) 137-50 MCG/ACT nasal spray Spray 1 spray into both nostrils 2 times daily 4/14/16  Yes Yahir Turcios MD   Cholecalciferol (VITAMIN D3 PO) Take 5,000 Units by mouth daily   Yes Reported, Patient   loratadine (CLARITIN) 10 MG tablet Take 10 mg by mouth daily as needed    Yes Reported, Patient  "  ASPIRIN 81 MG OR TABS Take 1 tablet (81 mg) by mouth at bedtime   Yes Reported, Patient       Vitals:  /81  Pulse 80  Temp 98  F (36.7  C) (Oral)  Ht 1.753 m (5' 9\")  Wt 87.2 kg (192 lb 3.2 oz)  SpO2 100%  BMI 28.38 kg/m2    Exam:  GENERAL APPEARANCE: Well groomed, NAD  PULM: lungs CTA. Breathing is non labored.   CV: RRR  -edema - no edema  GI: soft, NT, Non distended.   NEURO: Alert, oriented    Results:  Results for SANCHEZ MCNEIL (MRN [id number]) as of 4/19/2017 20:21   Ref. Range 4/19/2017 14:42 4/19/2017 14:47   Sodium Latest Ref Range: 133 - 144 mmol/L  134   Potassium Latest Ref Range: 3.4 - 5.3 mmol/L  4.3   Chloride Latest Ref Range: 94 - 109 mmol/L  93 (L)   Carbon Dioxide Latest Ref Range: 20 - 32 mmol/L  26   Urea Nitrogen Latest Ref Range: 7 - 30 mg/dL  171 (H)   Creatinine Latest Ref Range: 0.66 - 1.25 mg/dL  6.04 (H)   GFR Estimate Latest Ref Range: >60 mL/min/1.7m2  9 (L)   GFR Estimate If Black Latest Ref Range: >60 mL/min/1.7m2  11 (L)   Calcium Latest Ref Range: 8.5 - 10.1 mg/dL  8.5   Anion Gap Latest Ref Range: 3 - 14 mmol/L  14   Phosphorus Latest Ref Range: 2.5 - 4.5 mg/dL  4.1   Albumin Latest Ref Range: 3.4 - 5.0 g/dL  2.4 (L)   Creatinine Urine Latest Units: mg/dL 42    Ferritin Latest Ref Range: 26 - 388 ng/mL  1509 (H)   Iron Latest Ref Range: 35 - 180 ug/dL  63   Iron Binding Cap Latest Ref Range: 240 - 430 ug/dL  213 (L)   Iron Saturation Index Latest Ref Range: 15 - 46 %  30   Protein Random Urine Latest Units: g/L 0.39    Protein Total Urine g/gr Creatinine Latest Ref Range: 0 - 0.2 g/g Cr 0.93 (H)    Glucose Latest Ref Range: 70 - 99 mg/dL  224 (H)   WBC Latest Ref Range: 4.0 - 11.0 10e9/L     Hemoglobin Latest Ref Range: 13.3 - 17.7 g/dL  9.1 (L)   Hematocrit Latest Ref Range: 40.0 - 53.0 %  27.5 (L)   Platelet Count Latest Ref Range: 150 - 450 10e9/L         Patricia Cook, NP      "

## 2017-04-19 NOTE — TELEPHONE ENCOUNTER
Anemia Management Note  SUBJECTIVE/OBJECTIVE:  Referred by Dr. Brice Caraballo on 2015.  Primary Diagnosis: Anemia in Chronic Kidney Disease (N18.5 D63.1)   Secondary Diagnosis: Chronic Kidney Disease, Stage V (N18.5)  Hgb goal range: 9-10  Epo/Darbo: Aranesp 100 mcg every 2 weeks as needed - in clinic  RX order expires on 18  Iron regimen: Ferrous Sulfate TID - hold 17  Lab orders  on 2017 In Epic    Anemia Latest Ref Rng & Units 2017   ANASTASIYA Dose - - - - - 100 mcg - -   Hemoglobin 13.3 - 17.7 g/dL 8.4(L) 8.3(L) 8.3(L) 8.4(L) - 9.1(L) 9.1(L)   IV Iron Dose - - - - - - - -   TSAT 15 - 46 % - 10(L) - - - 30 -   Ferritin 26 - 388 ng/mL - 1194(H) - - - 1509(H) -      BP Readings from Last 3 Encounters:   17 112/62   17 146/73   03/15/17 153/70     Wt Readings from Last 2 Encounters:   17 185 lb 7 oz (84.1 kg)   03/15/17 198 lb (89.8 kg)       ASSESSMENT:  Hgb: At goal - recommend dose  TSat: at goal >30% Ferritin: Elevated (>1000ng/mL). Recommend hold ferrous sulfate. -la    PLAN:  Dose with aranesp and RTC for hgb then aranesp if needed in 2 week(s)  Stop taking Ferrous Sulfate until further notice    Orders needed to be renewed (for next follow-up date) in [facility]: None    Iron labs due:  17    Plan discussed with:  JANAE Dixonil  Plan provided by:  Laksiha    NEXT FOLLOW-UP DATE:  5/3/17    Anemia Management Service  Zelda Rich,EllisD and Rosa Marcial CPhT  Phone: [phone]  Fax: [phone]

## 2017-04-19 NOTE — PATIENT INSTRUCTIONS
1. Call Cardiology and schedule post hospital follow up, decline in EF.   2. Complete Prednisone taper, 20 mg daily x 5, 10 mg daily x 5, then d/c  3. Continue Allopurinol  4. Continue daily blood pressures/weights  5. Bring in blood pressure monitor next vist  6. Return to clinic 2 wks with labs prior

## 2017-04-24 ENCOUNTER — HOSPITAL ENCOUNTER (OUTPATIENT)
Facility: CLINIC | Age: 62
Setting detail: SPECIMEN
End: 2017-04-24
Payer: COMMERCIAL

## 2017-04-24 ENCOUNTER — MYC MEDICAL ADVICE (OUTPATIENT)
Dept: CARDIOLOGY | Facility: CLINIC | Age: 62
End: 2017-04-24

## 2017-04-24 ENCOUNTER — HOSPITAL ENCOUNTER (OUTPATIENT)
Dept: PHYSICAL THERAPY | Facility: CLINIC | Age: 62
Setting detail: THERAPIES SERIES
End: 2017-04-24
Attending: HOSPITALIST
Payer: COMMERCIAL

## 2017-04-24 ENCOUNTER — MYC MEDICAL ADVICE (OUTPATIENT)
Dept: FAMILY MEDICINE | Facility: CLINIC | Age: 62
End: 2017-04-24

## 2017-04-24 PROCEDURE — 97161 PT EVAL LOW COMPLEX 20 MIN: CPT | Mod: GP | Performed by: PHYSICAL THERAPIST

## 2017-04-24 PROCEDURE — 40000185 ZZHC STATISTIC PT OUTPT VISIT: Performed by: PHYSICAL THERAPIST

## 2017-04-24 PROCEDURE — 97110 THERAPEUTIC EXERCISES: CPT | Mod: GP | Performed by: PHYSICAL THERAPIST

## 2017-04-24 NOTE — DISCHARGE INSTRUCTIONS
4/24/17    You are doing well!    Easy activities to increase strength -     1.  5-10 times sit to stand.  2.  Practice the floor transfer  3.  Stairs at apartment  4.  Walk 10-15 minutes outside.  Go with a friend.    5.  Counter pushups 10-20x   6.  Wall sits 30 sec 2-4 times.    Call or email me in 2-3 weeks to let me know how you are doing and if you need to return.  Floresita Covington PT  [email or web address]  [phone]  :  [phone]

## 2017-04-24 NOTE — IP AVS SNAPSHOT
MRN:[id number]                      After Visit Summary   4/24/2017    Murray Nicholson    MRN: [id number]           Visit Information        Provider Department      4/24/2017 11:00 AM Floresita Covington, PT [facility], [place], Physical Therapy - Outpatient        Your next 10 appointments already scheduled     May 03, 2017  1:00 PM CDT   Lab with  LAB    Health Lab ([facility])    [address] [place] [address]   [phone]            May 03, 2017  1:30 PM CDT   (Arrive by 1:15 PM)   INJECTION with [facility] Nurse   [facility] Solid Organ Transplant ([facility])    [address] [place] [address]   [phone]            May 03, 2017  2:00 PM CDT   (Arrive by 1:30 PM)   Return Visit with Patricia Cook NP   [facility] Nephrology ([facility])    [address] [place] [address]   [phone]            Jul 12, 2017 12:00 PM CDT   Lab with  LAB    Health Lab ([facility])    [address] [place] [address]   [phone]            Jul 12, 2017  1:00 PM CDT   (Arrive by 12:30 PM)   Return Visit with Brice Caraballo MD   [facility] Nephrology ([facility])    [address] [place] [address]   [phone]                Further instructions from your care team       4/24/17    You are doing well!    Easy activities to increase strength -     1.  5-10 times sit to stand.  2.  Practice the floor transfer  3.  Stairs at apartment  4.  Walk 10-15 minutes outside.  Go with a friend.    5.  Counter pushups 10-20x   6.  Wall sits 30 sec 2-4 times.    Call or email me in 2-3 weeks to let me know how you are doing and if you need to return.  Floresita Covington PT  [email or web address]  [phone]  :  [phone]    MyChart Information     MyChart  gives you secure access to your electronic health record. If you see a primary care provider, you can also send messages to your care team and make appointments. If you have questions, please call your primary care clinic.  If you do not have a primary care provider, please call [phone] and they will assist you.        Care EveryWhere ID     This is your Care EveryWhere ID. This could be used by other organizations to access your [facility] medical records  [id number]

## 2017-04-24 NOTE — PROGRESS NOTES
"   04/24/17 1100   General Information   Start of Care Date 04/24/17   Referring Physician Km Portillo   Orders Evaluate and Treat as Indicated   Order Date 04/08/17   Medical Diagnosis Muscle weakness   Onset of illness/injury or Date of Surgery 04/08/17   Special Instructions [facility]; had hospital stay 4/8 to 4/13/17   Surgical/Medical history reviewed Yes   Prior level of function comment Murray here alone - one month ago, 3 days got weak, right leg.  trouble move it.  chart states \"gout\".  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  went to ER, fluids on heart/lungs.  feeling better but had problems w/ right hip/ gout.  i have restored mobility.  off work til 4/28.     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     Patient/Family Goals Statement 1.  better on stairs.  2.  get up from the floor easier.    General Information Comments pt here alone. works at [email or web address].  drives.  wears glasses.  has handicap sticker for car.  walking to work from parking lot.  works 5 days a week.  i will go back later this week.  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    Fall Risk Screen   Fall screen completed by PT   Per patient - Fall 2 or more times in past year? No   Fall screen comments one fall on ice.     System Outcome Measures   Outcome Measures AM-PAC  (not done, pt late, probable one visit today)   Pain   Patient currently in pain Yes   Pain comments a little back pain no right hip pain.   Vital Signs   Vital Signs BP;Pulse   Pulse 80   /67   SpO2 100 %   Cognitive Status Examination   Orientation orientation to person, place and time   Level of Consciousness alert   Follows Commands and Answers Questions 100% of the time   Personal Safety and Judgment intact   Memory intact   Integumentary   Integumentary No " deficits were identified   Posture   Posture Normal   Range of Motion (ROM)   ROM Comment wfls   Strength   Strength Comments R hip gr 4+   Bed Mobility   Bed Mobility Comments indep   Transfer Skills   Transfer Comments indep, 5 x sit/stand 9 sec. floor tx w/ cues.   Locomotion   Wheel Chair Mobility Comments n/a   Gait Special Tests   Gait Special Tests 25 FOOT TIMED WALK   Gait Special Tests 25 Foot Timed Walk   Seconds 8   Steps 16 Steps   Comments normal no lob   Balance   Balance Comments no c/o issues   Sensory Examination   Sensory Perception Comments occas tingling R foot; can tell if something in shoe   Coordination   Coordination Comments ok   Muscle Tone   Muscle Tone Comments ok   Planned Therapy Interventions   Planned Therapy Interventions strengthening   Clinical Impression   Criteria for Skilled Therapeutic Interventions Met yes, treatment indicated   PT Diagnosis mild weakness   Influenced by the following impairments weakness   Functional limitations due to impairments return to work - possible fatigue but anticipate being ready by 4/28/17   Clinical Presentation Stable/Uncomplicated   Clinical Presentation Rationale near baseline   Clinical Decision Making (Complexity) Low complexity   Therapy Frequency other (see comments)   Predicted Duration of Therapy Intervention (days/wks) up to one follow up if pt needs w/in next 30 days   Risk & Benefits of therapy have been explained Yes   Patient, Family & other staff in agreement with plan of care Yes   Clinical Impression Comments mild R hip weakness after gout and hospitalization - anticipate return to work 4/28.    Education Assessment   Preferred Learning Style Demonstration   Barriers to Learning No barriers   GOALS   PT Eval Goals 1   Goal 1   Goal Identifier HEP   Goal Description indep HEP for strengthening for return to work successfully   Target Date 05/23/17   Total Evaluation Time   Total Evaluation Time (Minutes) 20

## 2017-04-28 ENCOUNTER — OFFICE VISIT (OUTPATIENT)
Dept: FAMILY MEDICINE | Facility: CLINIC | Age: 62
End: 2017-04-28
Payer: COMMERCIAL

## 2017-04-28 VITALS
SYSTOLIC BLOOD PRESSURE: 128 MMHG | HEART RATE: 80 BPM | OXYGEN SATURATION: 100 % | TEMPERATURE: 97.9 F | DIASTOLIC BLOOD PRESSURE: 68 MMHG | RESPIRATION RATE: 18 BRPM | BODY MASS INDEX: 27.23 KG/M2 | WEIGHT: 184.4 LBS

## 2017-04-28 DIAGNOSIS — I12.9 RENAL HYPERTENSION, STAGE 1-4 OR UNSPECIFIED CHRONIC KIDNEY DISEASE: ICD-10-CM

## 2017-04-28 DIAGNOSIS — E78.5 HYPERLIPIDEMIA LDL GOAL <100: ICD-10-CM

## 2017-04-28 DIAGNOSIS — E11.22 TYPE 2 DIABETES MELLITUS WITH STAGE 5 CHRONIC KIDNEY DISEASE NOT ON CHRONIC DIALYSIS, WITHOUT LONG-TERM CURRENT USE OF INSULIN (H): ICD-10-CM

## 2017-04-28 DIAGNOSIS — N17.9 ACUTE-ON-CHRONIC KIDNEY INJURY (H): ICD-10-CM

## 2017-04-28 DIAGNOSIS — Z09 HOSPITAL DISCHARGE FOLLOW-UP: ICD-10-CM

## 2017-04-28 DIAGNOSIS — E87.70 HYPERVOLEMIA, UNSPECIFIED HYPERVOLEMIA TYPE: ICD-10-CM

## 2017-04-28 DIAGNOSIS — I71.21 ASCENDING AORTIC ANEURYSM (H): ICD-10-CM

## 2017-04-28 DIAGNOSIS — N18.5 TYPE 2 DIABETES MELLITUS WITH STAGE 5 CHRONIC KIDNEY DISEASE NOT ON CHRONIC DIALYSIS, WITHOUT LONG-TERM CURRENT USE OF INSULIN (H): ICD-10-CM

## 2017-04-28 DIAGNOSIS — N18.9 ACUTE-ON-CHRONIC KIDNEY INJURY (H): ICD-10-CM

## 2017-04-28 DIAGNOSIS — I50.40 COMBINED SYSTOLIC AND DIASTOLIC CONGESTIVE HEART FAILURE, UNSPECIFIED CONGESTIVE HEART FAILURE CHRONICITY: Primary | ICD-10-CM

## 2017-04-28 DIAGNOSIS — N18.9 CHRONIC KIDNEY DISEASE, UNSPECIFIED: ICD-10-CM

## 2017-04-28 DIAGNOSIS — I50.22 CHRONIC SYSTOLIC CONGESTIVE HEART FAILURE (H): ICD-10-CM

## 2017-04-28 DIAGNOSIS — M10.9 ACUTE GOUTY ARTHRITIS: ICD-10-CM

## 2017-04-28 DIAGNOSIS — D50.9 IRON DEFICIENCY ANEMIA, UNSPECIFIED IRON DEFICIENCY ANEMIA TYPE: ICD-10-CM

## 2017-04-28 DIAGNOSIS — N18.30 CKD (CHRONIC KIDNEY DISEASE) STAGE 3, GFR 30-59 ML/MIN (H): ICD-10-CM

## 2017-04-28 LAB
ALBUMIN SERPL-MCNC: 2.7 G/DL (ref 3.4–5)
ALP SERPL-CCNC: 129 U/L (ref 40–150)
ALT SERPL W P-5'-P-CCNC: 13 U/L (ref 0–70)
ANION GAP SERPL CALCULATED.3IONS-SCNC: 16 MMOL/L (ref 3–14)
AST SERPL W P-5'-P-CCNC: 11 U/L (ref 0–45)
BASOPHILS # BLD AUTO: 0 10E9/L (ref 0–0.2)
BASOPHILS NFR BLD AUTO: 0.1 %
BILIRUB SERPL-MCNC: 0.5 MG/DL (ref 0.2–1.3)
BUN SERPL-MCNC: 161 MG/DL (ref 7–30)
CALCIUM SERPL-MCNC: 8 MG/DL (ref 8.5–10.1)
CHLORIDE SERPL-SCNC: 95 MMOL/L (ref 94–109)
CO2 SERPL-SCNC: 29 MMOL/L (ref 20–32)
CREAT SERPL-MCNC: 5.73 MG/DL (ref 0.66–1.25)
DIFFERENTIAL METHOD BLD: ABNORMAL
EOSINOPHIL # BLD AUTO: 0.2 10E9/L (ref 0–0.7)
EOSINOPHIL NFR BLD AUTO: 1.7 %
ERYTHROCYTE [DISTWIDTH] IN BLOOD BY AUTOMATED COUNT: 18.1 % (ref 10–15)
GFR SERPL CREATININE-BSD FRML MDRD: 10 ML/MIN/1.7M2
GLUCOSE SERPL-MCNC: 116 MG/DL (ref 70–99)
HCT VFR BLD AUTO: 28.8 % (ref 40–53)
HGB BLD-MCNC: 9.2 G/DL (ref 13.3–17.7)
LYMPHOCYTES # BLD AUTO: 1.1 10E9/L (ref 0.8–5.3)
LYMPHOCYTES NFR BLD AUTO: 9.1 %
MCH RBC QN AUTO: 29.8 PG (ref 26.5–33)
MCHC RBC AUTO-ENTMCNC: 31.9 G/DL (ref 31.5–36.5)
MCV RBC AUTO: 93 FL (ref 78–100)
MONOCYTES # BLD AUTO: 0.9 10E9/L (ref 0–1.3)
MONOCYTES NFR BLD AUTO: 7.7 %
NEUTROPHILS # BLD AUTO: 9.8 10E9/L (ref 1.6–8.3)
NEUTROPHILS NFR BLD AUTO: 81.4 %
PLATELET # BLD AUTO: 263 10E9/L (ref 150–450)
POTASSIUM SERPL-SCNC: 3.1 MMOL/L (ref 3.4–5.3)
PROT SERPL-MCNC: 6.4 G/DL (ref 6.8–8.8)
RBC # BLD AUTO: 3.09 10E12/L (ref 4.4–5.9)
SODIUM SERPL-SCNC: 140 MMOL/L (ref 133–144)
URATE SERPL-MCNC: 17.9 MG/DL (ref 3.5–7.2)
WBC # BLD AUTO: 12 10E9/L (ref 4–11)

## 2017-04-28 PROCEDURE — 84550 ASSAY OF BLOOD/URIC ACID: CPT

## 2017-04-28 PROCEDURE — 36415 COLL VENOUS BLD VENIPUNCTURE: CPT

## 2017-04-28 PROCEDURE — 80053 COMPREHEN METABOLIC PANEL: CPT | Performed by: NURSE PRACTITIONER

## 2017-04-28 PROCEDURE — 85025 COMPLETE CBC W/AUTO DIFF WBC: CPT | Performed by: NURSE PRACTITIONER

## 2017-04-28 PROCEDURE — 99214 OFFICE O/P EST MOD 30 MIN: CPT | Performed by: NURSE PRACTITIONER

## 2017-04-28 RX ORDER — LOSARTAN POTASSIUM 100 MG/1
100 TABLET ORAL DAILY
Qty: 90 TABLET | Refills: 0 | Status: SHIPPED | OUTPATIENT
Start: 2017-04-28 | End: 2017-07-28

## 2017-04-28 RX ORDER — METOPROLOL SUCCINATE 100 MG/1
100 TABLET, EXTENDED RELEASE ORAL DAILY
Qty: 90 TABLET | Refills: 3 | Status: SHIPPED | OUTPATIENT
Start: 2017-04-28 | End: 2017-12-06

## 2017-04-28 RX ORDER — SODIUM BICARBONATE 650 MG/1
1300 TABLET ORAL 3 TIMES DAILY
Qty: 180 TABLET | Refills: 0 | Status: SHIPPED | OUTPATIENT
Start: 2017-04-28 | End: 2017-05-17

## 2017-04-28 RX ORDER — POTASSIUM CHLORIDE 1500 MG/1
20 TABLET, EXTENDED RELEASE ORAL DAILY
Qty: 90 TABLET | Refills: 0 | Status: SHIPPED | OUTPATIENT
Start: 2017-04-28 | End: 2017-05-17

## 2017-04-28 RX ORDER — HYDRALAZINE HYDROCHLORIDE 50 MG/1
50 TABLET, FILM COATED ORAL 3 TIMES DAILY
Qty: 270 TABLET | Refills: 3 | Status: SHIPPED | OUTPATIENT
Start: 2017-04-28 | End: 2018-01-29

## 2017-04-28 RX ORDER — ATORVASTATIN CALCIUM 40 MG/1
40 TABLET, FILM COATED ORAL DAILY
Qty: 90 TABLET | Refills: 3 | Status: ON HOLD | OUTPATIENT
Start: 2017-04-28 | End: 2018-05-06

## 2017-04-28 RX ORDER — ISOSORBIDE MONONITRATE 60 MG/1
60 TABLET, EXTENDED RELEASE ORAL DAILY
Qty: 90 TABLET | Refills: 3 | Status: ON HOLD | OUTPATIENT
Start: 2017-04-28 | End: 2018-02-14

## 2017-04-28 NOTE — LETTER
"[address] [address]  [phone]      April 28, 2017      Murray Nicholson  [address]  [place] [address]        To whom it may concern,    Murray is cleared to return to work with your \"normal\" work schedule.      Sincerely,        Leia De Leon APRN CNP      "

## 2017-04-28 NOTE — MR AVS SNAPSHOT
After Visit Summary   4/28/2017    Murray Nicholson    MRN: [id number]           Patient Information     Date Of Birth          1955        Visit Information        Provider Department      4/28/2017 8:00 AM Leia De Leon APRN [facility]        Today's Diagnoses     Combined systolic and diastolic congestive heart failure, unspecified congestive heart failure chronicity (H)    -  1    Hypervolemia, unspecified hypervolemia type        Hospital discharge follow-up        Acute-on-chronic kidney injury (H)        Iron deficiency anemia, unspecified iron deficiency anemia type        Ascending aortic aneurysm (H)        Acute gouty arthritis        Chronic kidney disease, unspecified        CKD (chronic kidney disease) stage 3, GFR 30-59 ml/min        Hyperlipidemia LDL goal <100        Chronic systolic congestive heart failure (H)        Renal hypertension, stage 1-4 or unspecified chronic kidney disease        Type 2 diabetes mellitus with 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    Continue care with your specialists.  Return to the clinic to see Dr. Turcios May/Damaris for a routine follow up/diabetes, etc.        Follow-ups after your visit        Your next 10 appointments already scheduled     May 03, 2017  1:00 PM CDT   Lab with  LAB   [facility] ([facility])    [address] [place] [address]  1st [facility] [id number] [phone]            May 03, 2017  1:30 PM CDT   (Arrive by 1:15 PM)   INJECTION with  Tx Nurse   [facility] Solid Organ Transplant ([facility])    [address] [place] [address]  3rd [facility] [id number] [phone]            May 03, 2017  2:00 PM CDT   (Arrive by 1:30 PM)   Return Visit with Patricia Cook NP   [facility] Nephrology ([facility])    [address] [place] [address]  3rd [facility] [address]   [phone]            Jul 12, 2017 12:00 PM CDT   Lab with  LAB   [facility] ([facility])    [address] [place] [address]   [phone]            Jul 12, 2017  1:00 PM CDT   (Arrive by 12:30 PM)   Return Visit with Brice Caraballo MD   [facility] Nephrology ([facility])    [address] [place] [id number] [phone]              Who to contact     If you have questions or need follow up information about today's clinic visit or your schedule please contact [facility] directly at [phone].  Normal or non-critical lab and imaging results will be communicated to you by MyChart, letter or phone within 4 business days after the clinic has received the results. If you do not hear from us within 7 days, please contact the clinic through [employer] or phone. If you have a critical or abnormal lab result, we will notify you by phone as soon as possible.  Submit refill requests through [employer] or call your pharmacy and they will forward the refill request to us. Please allow 3 business days for your refill to be completed.          Additional Information About Your Visit        [employer] Information     [employer] gives you secure access to your electronic health record. If you see a primary care provider, you can also send messages to your care team and make appointments. If you have questions, please call your primary care clinic.  If you do not have a primary care provider, please call [phone] and they will assist you.        Care EveryWhere ID     This is your Care EveryWhere ID. This could be used by other organizations to access your [facility] medical records  [id number]        Your Vitals Were     Pulse Temperature Respirations Pulse Oximetry BMI (Body Mass Index)       80 97.9  F (36.6  C) (Oral) 18  100% 27.23 kg/m2        Blood Pressure from Last 3 Encounters:   04/28/17 128/68   04/19/17 147/81   04/13/17 112/62    Weight from Last 3 Encounters:   04/28/17 184 lb 6.4 oz (83.6 kg)   04/19/17 192 lb 3.2 oz (87.2 kg)   04/13/17 185 lb 7 oz (84.1 kg)              We Performed the Following     CBC with platelets and differential     Comprehensive metabolic panel     Uric acid          Today's Medication Changes          These changes are accurate as of: 4/28/17  9:01 AM.  If you have any questions, ask your nurse or doctor.               These medicines have changed or have updated prescriptions.        Dose/Directions    atorvastatin 40 MG tablet   Commonly known as:  LIPITOR   This may have changed:  when to take this   Used for:  CKD (chronic kidney disease) stage 3, GFR 30-59 ml/min, Hyperlipidemia LDL goal <100        Dose:  40 mg   Take 1 tablet (40 mg) by mouth daily   Quantity:  90 tablet   Refills:  3            Where to get your medicines      These medications were sent to [id number] - [place], [place] - [address], [place] [address]     Phone:  [phone]     atorvastatin 40 MG tablet    hydrALAZINE 50 MG tablet    isosorbide mononitrate 60 MG 24 hr tablet    losartan 100 MG tablet    metoprolol 100 MG 24 hr tablet    potassium chloride SA 20 MEQ CR tablet    sodium bicarbonate 650 MG tablet                Primary Care Provider Office Phone # Fax #    Yahir Turcios [phone]       [address]  [facility] [address]        Thank you!     Thank you for choosing [facility]  for your care. Our goal is always to provide you with excellent care. Hearing back from our patients is one way we can continue to improve our services. Please take a few minutes to complete the written survey that you may receive in the mail after your visit with us. Thank you!             Your  Updated Medication List - Protect others around you: Learn how to safely use, store and throw away your medicines at www.disposemymeds.org.          This list is accurate as of: 4/28/17  9:01 AM.  Always use your most recent med list.                   Brand Name Dispense Instructions for use    acetaminophen 325 MG tablet    TYLENOL    100 tablet    Take 1-2 tablets (325-650 mg) by mouth every 4 hours as needed for mild pain Do not take more than 10 tablets in 24 hours       albuterol 108 (90 BASE) MCG/ACT Inhaler    PROAIR HFA/PROVENTIL HFA/VENTOLIN HFA    1 Inhaler    Inhale 2 puffs into the lungs every 6 hours as needed for shortness of breath / dyspnea or wheezing       allopurinol 100 MG tablet    ZYLOPRIM    30 tablet    Take 0.5 tablets (50 mg) by mouth daily       amLODIPine 5 MG tablet    NORVASC     Take 5 mg by mouth 2 times daily       aspirin 81 MG tablet      Take 1 tablet (81 mg) by mouth at bedtime       atorvastatin 40 MG tablet    LIPITOR    90 tablet    Take 1 tablet (40 mg) by mouth daily       azelastine-fluticasone 137-50 MCG/ACT nasal spray    DYMISTA    23 g    Spray 1 spray into both nostrils 2 times daily       darbepoetin emily 100 MCG/0.5ML injection    ARANESP (ALBUMIN FREE)    0.5 mL    Inject 0.5 mLs (100 mcg) Subcutaneous every 14 days As needed for hgb<10g/dL.  If Hgb increases >1 point in 2 weeks (if blood transfusion given, use hgb PRIOR to this), SYSTOLIC BP > 180 mmHg or hgb>=10g/dL, HOLD DOSE. Dose must be within 1 week of Hgb.  Per anemia protocol with Brice Caraballo MD       docusate sodium 100 MG capsule    COLACE    60 capsule    Take 1 capsule (100 mg) by mouth 2 times daily       furosemide 80 MG tablet    LASIX    120 tablet    Take 1 tablet (80 mg) by mouth 2 times daily In the morning and at 3 PM       glipiZIDE 5 MG tablet    GLUCOTROL    90 tablet    Take 1 tablet by mouth. TAKE 1 TABLET BY MOUTH DAILY BEFORE A MEAL       hydrALAZINE 50 MG tablet    APRESOLINE    270  tablet    Take 1 tablet (50 mg) by mouth 3 times daily       isosorbide mononitrate 60 MG 24 hr tablet    IMDUR    90 tablet    Take 1 tablet (60 mg) by mouth daily       loratadine 10 MG tablet    CLARITIN     Take 10 mg by mouth daily as needed       losartan 100 MG tablet    COZAAR    90 tablet    Take 1 tablet (100 mg) by mouth daily       metolazone 2.5 MG tablet    ZAROXOLYN    30 tablet    Take 1 tablet (2.5 mg) by mouth daily       metoprolol 100 MG 24 hr tablet    TOPROL XL    90 tablet    Take 1 tablet (100 mg) by mouth daily       omeprazole 20 MG CR capsule    priLOSEC     Take 20 mg by mouth daily       polyethylene glycol Packet    MIRALAX/GLYCOLAX    10 packet    Take 17 g by mouth daily as needed for constipation       potassium chloride SA 20 MEQ CR tablet    K-DUR/KLOR-CON M    90 tablet    Take 1 tablet (20 mEq) by mouth daily       predniSONE 10 MG tablet    DELTASONE    50 tablet    Take 1 tablet (10 mg) by mouth daily Take 4 tablets for 4 days (40 mg/day), followed by 2 tablets for 5 days (20 mg/day), followed by 1 tablet daily (10 mg/day)       sodium bicarbonate 650 MG tablet     180 tablet    Take 2 tablets (1,300 mg) by mouth 3 times daily       VITAMIN D3 PO      Take 5,000 Units by mouth daily

## 2017-04-28 NOTE — PROGRESS NOTES
"  SUBJECTIVE:                                                    Murray Nicholson is a 62 year old male who presents to clinic today for the following health issues:  Chief Complaint   Patient presents with     Letter for School/Work       Hospital Follow-up Visit:    Hospital/Nursing Home/IP Rehab Facility: [facility]  Date of Admission: 4/8/2017  Date of Discharge: 4/13/2017  Reason(s) for Admission: fluid overload/CHF, Chronic renal failure, etc            Problems taking medications regularly:  None       Medication changes since discharge: None       Problems adhering to non-medication therapy:  None  Summary of hospitalization:  [facility] discharge summary reviewed  Diagnostic Tests/Treatments reviewed.  Follow up needed: labs ordered today  Other Healthcare Providers Involved in Patient s Care:         renal, cardiology  Update since discharge: improved.     Post Discharge Medication Reconciliation: discharge medications reconciled, continue medications without change.  Plan of care communicated with patient     Coding guidelines for this visit:  Type of Medical   Decision Making Face-to-Face Visit       within 7 Days of discharge Face-to-Face Visit        within 14 days of discharge   Moderate Complexity [id number]   High Complexity 61170 04178              He has been on STD since 4/8/2017.  He plans to go back to work today with the intent to work his normal work schedule.  [employer] at [employer].    Today feeling much better.   \"i feel good.    Request for medication refills today:  Potassium 20  Sodium Bicarb  Isosorbide Mono  Metoprolol  Atorvastatin   Hydralazine  Losartan      Nephrology:  Appointment May 2    Cardiology:  Dr. Posada  No known schedled appointment.    Today:  Feeling good.  Energy improved.  No SOB.    No chest pain.  Eating and drinking normally.  No pedal edema.       Problem list and histories reviewed & adjusted, as indicated.  Additional history: as documented    Patient Active " Problem List   Diagnosis     Esophageal reflux     Hypersomnia with sleep apnea     Allergic rhinitis     CHF (congestive heart failure) (H)     CKD (chronic kidney disease) stage 5, GFR less than 15 ml/min (H)     Hyperlipidemia LDL goal <100     Hypertension goal BP (blood pressure) < 130/80     Hypertensive cardiopathy     Tubular adenoma     Anemia of chronic disease     Bicuspid aortic valve     CAD (coronary artery disease)     (HFpEF) heart failure with preserved ejection fraction (H)     Anemia in chronic renal disease     Hypertension     Dyslipidemia     MGUS (monoclonal gammopathy of unknown significance)     Ascending aortic aneurysm (H)     Type 2 diabetes mellitus with diabetic chronic kidney disease (H)     Anemia, iron deficiency     Anemia in stage 5 chronic kidney disease (H)     Fluid overload     Past Surgical History:   Procedure Laterality Date     ABDOMEN SURGERY      Hernia     LAPAROSCOPIC HERNIORRHAPHY INGUINAL BILATERAL Bilateral 2015    Procedure: LAPAROSCOPIC HERNIORRHAPHY INGUINAL BILATERAL;  Surgeon: Bobby Mcconnell MD;  Location:  OR     NO HISTORY OF SURGERY         Social History   Substance Use Topics     Smoking status: Former Smoker     Packs/day: 1.00     Years: 19.00     Types: Cigarettes     Quit date: 1994     Smokeless tobacco: Never Used     Alcohol use 0.0 oz/week      Comment: 2 drinks per week if that     Family History   Problem Relation Age of Onset     C.A.D. Father       from-never knew father-age 60     DIABETES Father      CEREBROVASCULAR DISEASE Father      Hypertension No family hx of      Breast Cancer No family hx of      Cancer - colorectal No family hx of      Prostate Cancer No family hx of          Current Outpatient Prescriptions   Medication Sig Dispense Refill     potassium chloride SA (K-DUR/KLOR-CON M) 20 MEQ CR tablet Take 1 tablet (20 mEq) by mouth daily 90 tablet 0     sodium bicarbonate 650 MG tablet Take 2 tablets (1,300 mg)  by mouth 3 times daily 180 tablet 0     atorvastatin (LIPITOR) 40 MG tablet Take 1 tablet (40 mg) by mouth daily 90 tablet 3     isosorbide mononitrate (IMDUR) 60 MG 24 hr tablet Take 1 tablet (60 mg) by mouth daily 90 tablet 3     metoprolol (TOPROL XL) 100 MG 24 hr tablet Take 1 tablet (100 mg) by mouth daily 90 tablet 3     losartan (COZAAR) 100 MG tablet Take 1 tablet (100 mg) by mouth daily 90 tablet 0     hydrALAZINE (APRESOLINE) 50 MG tablet Take 1 tablet (50 mg) by mouth 3 times daily 270 tablet 3     amLODIPine (NORVASC) 5 MG tablet Take 5 mg by mouth 2 times daily  3     acetaminophen (TYLENOL) 325 MG tablet Take 1-2 tablets (325-650 mg) by mouth every 4 hours as needed for mild pain Do not take more than 10 tablets in 24 hours 100 tablet 0     metolazone (ZAROXOLYN) 2.5 MG tablet Take 1 tablet (2.5 mg) by mouth daily 30 tablet 0     glipiZIDE (GLUCOTROL) 5 MG tablet Take 1 tablet by mouth. TAKE 1 TABLET BY MOUTH DAILY BEFORE A MEAL 90 tablet 0     furosemide (LASIX) 80 MG tablet Take 1 tablet (80 mg) by mouth 2 times daily In the morning and at 3  tablet 0     allopurinol (ZYLOPRIM) 100 MG tablet Take 0.5 tablets (50 mg) by mouth daily 30 tablet 0     docusate sodium (COLACE) 100 MG capsule Take 1 capsule (100 mg) by mouth 2 times daily 60 capsule 1     polyethylene glycol (MIRALAX/GLYCOLAX) Packet Take 17 g by mouth daily as needed for constipation 10 packet 0     predniSONE (DELTASONE) 10 MG tablet Take 1 tablet (10 mg) by mouth daily Take 4 tablets for 4 days (40 mg/day), followed by 2 tablets for 5 days (20 mg/day), followed by 1 tablet daily (10 mg/day) 50 tablet 0     omeprazole (PRILOSEC) 20 MG CR capsule Take 20 mg by mouth daily       darbepoetin emily (ARANESP, ALBUMIN FREE,) 100 MCG/0.5ML injection Inject 0.5 mLs (100 mcg) Subcutaneous every 14 days As needed for hgb<10g/dL.  If Hgb increases >1 point in 2 weeks (if blood transfusion given, use hgb PRIOR to this), SYSTOLIC BP > 180 mmHg or  hgb>=10g/dL, HOLD DOSE. Dose must be within 1 week of Hgb.  Per anemia protocol with Brice Caraballo MD 0.5 mL 99     albuterol (PROAIR HFA/PROVENTIL HFA/VENTOLIN HFA) 108 (90 BASE) MCG/ACT Inhaler Inhale 2 puffs into the lungs every 6 hours as needed for shortness of breath / dyspnea or wheezing 1 Inhaler 0     Cholecalciferol (VITAMIN D3 PO) Take 5,000 Units by mouth daily       loratadine (CLARITIN) 10 MG tablet Take 10 mg by mouth daily as needed        ASPIRIN 81 MG OR TABS Take 1 tablet (81 mg) by mouth at bedtime       [DISCONTINUED] losartan (COZAAR) 100 MG tablet Take 1 tablet (100 mg) by mouth daily 30 tablet 0     [DISCONTINUED] atorvastatin (LIPITOR) 40 MG tablet Take 1 tablet (40 mg) by mouth daily (Patient taking differently: Take 40 mg by mouth every evening ) 90 tablet 1     [DISCONTINUED] hydrALAZINE (APRESOLINE) 50 MG tablet Take 1 tablet (50 mg) by mouth 3 times daily 270 tablet 3     [DISCONTINUED] isosorbide mononitrate (IMDUR) 60 MG 24 hr tablet Take 1 tablet (60 mg) by mouth daily 90 tablet 3     [DISCONTINUED] metoprolol (TOPROL XL) 100 MG 24 hr tablet Take 1 tablet (100 mg) by mouth daily 90 tablet 3     azelastine-fluticasone (DYMISTA) 137-50 MCG/ACT nasal spray Spray 1 spray into both nostrils 2 times daily (Patient not taking: Reported on 4/28/2017) 23 g 11     Allergies   Allergen Reactions     Cats      Throat tightness     Penicillins Hives     Seasonal Allergies      rhinitis     Shrimp      Throat closes      Recent Labs   Lab Test  04/19/17   1447  04/13/17   0651  04/12/17   0935   04/11/17   0722   04/09/17   0602  04/08/17   1515   11/29/16   1120  11/08/16   0910   04/14/16   0958   04/09/15   0900   A1C   --    --    --    --    --    --   7.2*   --    --    --   6.3*   --   6.4*   < >  6.2*   LDL   --    --    --    --   38   --    --    --    --   53   --    --   115*   < >  Cannot estimate LDL when triglyceride exceeds 400 mg/dL  85   HDL   --    --    --    --   34*   --    --     --    --   36*   --    --   48   < >  46   TRIG   --    --    --    --   145   --    --    --    --   147   --    --   240*   < >  426*   ALT   --   13   --    --    --    --    --   18   --    --    --    --   18   < >  25   CR  6.04*  6.09*   --    < >  6.23*   < >  6.86*  6.98*   < >  5.05*   --    < >  4.35*   < >  4.27*   GFRESTIMATED  9*  9*   --    < >  9*   < >  8*  8*   < >  12*   --    < >  14*   < >  14*   GFRESTBLACK  11*  11*   --    < >  11*   < >  10*  10*   < >  14*   --    < >  17*   < >  17*   POTASSIUM  4.3  3.7   --    < >  3.7   < >  4.6  4.8   < >  3.9   --    < >  3.5   < >  3.8   TSH   --    --   1.26   --    --    --    --    --    --    --    --    --    --    --   3.47    < > = values in this interval not displayed.      BP Readings from Last 3 Encounters:   04/28/17 128/68   04/19/17 147/81   04/13/17 112/62    Wt Readings from Last 3 Encounters:   04/28/17 184 lb 6.4 oz (83.6 kg)   04/19/17 192 lb 3.2 oz (87.2 kg)   04/13/17 185 lb 7 oz (84.1 kg)                  Labs reviewed in EPIC    Reviewed and updated as needed this visit by clinical staff       Reviewed and updated as needed this visit by Provider         ROS:  Constitutional, HEENT, cardiovascular, pulmonary, GI, , musculoskeletal, neuro, skin, endocrine and psych systems are negative, except as otherwise noted.    OBJECTIVE:                                                    /68  Pulse 80  Temp 97.9  F (36.6  C) (Oral)  Resp 18  Wt 184 lb 6.4 oz (83.6 kg)  SpO2 100%  BMI 27.23 kg/m2  Body mass index is 27.23 kg/(m^2).  GENERAL: healthy, alert and no distress  EYES: Eyes grossly normal to inspection, PERRL and conjunctivae and sclerae normal  HENT: ear canals and TM's normal, nose and mouth without ulcers or lesions  NECK: no adenopathy, no asymmetry, masses, or scars and thyroid normal to palpation  RESP: lungs clear to auscultation - no rales, rhonchi or wheezes  CV: regular rate and rhythm, normal S1 S2, no  peripheral edema and peripheral pulses strong  MS: no gross musculoskeletal defects noted, no edema  SKIN: warm and dry  NEURO: Normal strength and tone, mentation intact and speech normal  PSYCH: mentation appears normal, affect normal/bright  LYMPH: no cervical or supraclavicular adenopathy    Labs drawn and in process     ASSESSMENT/PLAN:                                                    (I50.40) Combined systolic and diastolic congestive heart failure, unspecified congestive heart failure chronicity (H)  (primary encounter diagnosis)  Comment:   Plan: potassium chloride SA (K-DUR/KLOR-CON M) 20 MEQ        CR tablet            (E87.70) Hypervolemia, unspecified hypervolemia type  Comment:   Plan: potassium chloride SA (K-DUR/KLOR-CON M) 20 MEQ        CR tablet            (Z09) Hospital discharge follow-up  Comment:   Plan: CBC with platelets and differential,         Comprehensive metabolic panel, potassium         chloride SA (K-DUR/KLOR-CON M) 20 MEQ CR tablet            (N17.9,  N18.9) Acute-on-chronic kidney injury (H)  Comment:   Plan:     (D50.9) Iron deficiency anemia, unspecified iron deficiency anemia type  Comment:   Plan:     (I71.2) Ascending aortic aneurysm (H)  Comment:   Plan:     (M10.9) Acute gouty arthritis  Comment:   Plan: CBC with platelets and differential,         Comprehensive metabolic panel            (N18.9) Chronic kidney disease, unspecified  Comment:   Plan: sodium bicarbonate 650 MG tablet            (N18.3) CKD (chronic kidney disease) stage 3, GFR 30-59 ml/min  Comment:   Plan: atorvastatin (LIPITOR) 40 MG tablet            (E78.5) Hyperlipidemia LDL goal <100  Comment:   Plan: atorvastatin (LIPITOR) 40 MG tablet            (I50.22) Chronic systolic congestive heart failure (H)  Comment:   Plan: isosorbide mononitrate (IMDUR) 60 MG 24 hr         tablet, metoprolol (TOPROL XL) 100 MG 24 hr         tablet            (I12.9) Renal hypertension, stage 1-4 or unspecified chronic  "kidney disease  Comment:   Plan: losartan (COZAAR) 100 MG tablet            (E11.22,  N18.5) Type 2 diabetes mellitus with stage 5 chronic kidney disease not on chronic dialysis, without long-term current use of insulin (H)  Comment:   Plan: hydrALAZINE (APRESOLINE) 50 MG tablet          Murray is feeling great today.  I refilled the 7 medications that he requested.  I did discuss with him that Bicarb is typically a renal prescription--he is to discuss sodium bicarb at his 5/2 appointment with his nephrologist.  He will continue to take good care of himself with a healthy diet.  Letter written/okay to return to work today with his \"normal\" work schedule.  He is to return to the clinic in 1-2 months to see Dr. Turcios for a routine follow up/check in/diabetes appointment.  He is to return sooner if problems.      VERONICA Shah [facility]    "

## 2017-04-28 NOTE — PATIENT INSTRUCTIONS
Continue to take good care of yourself.    Continue care with your specialists.  Return to the clinic to see Dr. Turcios May/June for a routine follow up/diabetes, etc.

## 2017-04-28 NOTE — NURSING NOTE
"Chief Complaint   Patient presents with     Letter for School/Work       Initial /68  Pulse 80  Temp 97.9  F (36.6  C) (Oral)  Resp 18  Wt 184 lb 6.4 oz (83.6 kg)  SpO2 100%  BMI 27.23 kg/m2 Estimated body mass index is 27.23 kg/(m^2) as calculated from the following:    Height as of 4/19/17: 5' 9\" (1.753 m).    Weight as of this encounter: 184 lb 6.4 oz (83.6 kg).  Medication Reconciliation: complete     Donna Brooks MA    "

## 2017-05-01 ENCOUNTER — TELEPHONE (OUTPATIENT)
Dept: NEPHROLOGY | Facility: CLINIC | Age: 62
End: 2017-05-01

## 2017-05-01 DIAGNOSIS — N18.5 CKD (CHRONIC KIDNEY DISEASE) STAGE 5, GFR LESS THAN 15 ML/MIN (H): Primary | ICD-10-CM

## 2017-05-01 ASSESSMENT — ENCOUNTER SYMPTOMS
SHORTNESS OF BREATH: 1
CHILLS: 0
NIGHT SWEATS: 0
WEIGHT LOSS: 1
FEVER: 0
SPUTUM PRODUCTION: 0
SNORES LOUDLY: 0
POSTURAL DYSPNEA: 1
HEMOPTYSIS: 0
DIFFICULTY URINATING: 0
POLYDIPSIA: 0
COUGH DISTURBING SLEEP: 1
INCREASED ENERGY: 1
FATIGUE: 1
COUGH: 1
HALLUCINATIONS: 0
FLANK PAIN: 1
HEMATURIA: 0
WHEEZING: 0
DYSURIA: 0
ALTERED TEMPERATURE REGULATION: 0
POLYPHAGIA: 0
DYSPNEA ON EXERTION: 1
RESPIRATORY PAIN: 0

## 2017-05-01 NOTE — TELEPHONE ENCOUNTER
Received message from call center regarding appointment rescheduled from 5/3 with Julieta Joey to 5/5 with Julieta, patient states that he never received a phone call and wanted appointment to be rescheduled to 5/3 with Julieta. Writer left voice message for patient regarding message, apologized and informed him that we will reschedule for 5/3 with Julieta at 400. Provided number for call back with questions or concerns.  Vickie Bryson LPN  Nephrology  [facility]  [phone]

## 2017-05-03 ENCOUNTER — OFFICE VISIT (OUTPATIENT)
Dept: NEPHROLOGY | Facility: CLINIC | Age: 62
End: 2017-05-03
Payer: COMMERCIAL

## 2017-05-03 ENCOUNTER — TELEPHONE (OUTPATIENT)
Dept: PHARMACY | Facility: CLINIC | Age: 62
End: 2017-05-03

## 2017-05-03 VITALS
DIASTOLIC BLOOD PRESSURE: 70 MMHG | BODY MASS INDEX: 27.05 KG/M2 | WEIGHT: 182.6 LBS | HEIGHT: 69 IN | HEART RATE: 88 BPM | OXYGEN SATURATION: 99 % | SYSTOLIC BLOOD PRESSURE: 126 MMHG

## 2017-05-03 DIAGNOSIS — N18.5 CKD (CHRONIC KIDNEY DISEASE) STAGE 5, GFR LESS THAN 15 ML/MIN (H): ICD-10-CM

## 2017-05-03 DIAGNOSIS — D63.1 ANEMIA IN STAGE 5 CHRONIC KIDNEY DISEASE (H): ICD-10-CM

## 2017-05-03 DIAGNOSIS — D63.1 ANEMIA IN STAGE 5 CHRONIC KIDNEY DISEASE (H): Primary | ICD-10-CM

## 2017-05-03 DIAGNOSIS — N18.5 ANEMIA IN STAGE 5 CHRONIC KIDNEY DISEASE (H): Primary | ICD-10-CM

## 2017-05-03 DIAGNOSIS — N18.5 ANEMIA IN STAGE 5 CHRONIC KIDNEY DISEASE (H): ICD-10-CM

## 2017-05-03 DIAGNOSIS — M10.9 GOUT, UNSPECIFIED CAUSE, UNSPECIFIED CHRONICITY, UNSPECIFIED SITE: ICD-10-CM

## 2017-05-03 LAB
ALBUMIN UR-MCNC: 100 MG/DL
ANION GAP SERPL CALCULATED.3IONS-SCNC: 17 MMOL/L (ref 3–14)
APPEARANCE UR: CLEAR
BACTERIA #/AREA URNS HPF: ABNORMAL /HPF
BILIRUB UR QL STRIP: NEGATIVE
BUN SERPL-MCNC: 173 MG/DL (ref 7–30)
CALCIUM SERPL-MCNC: 7.4 MG/DL (ref 8.5–10.1)
CHLORIDE SERPL-SCNC: 95 MMOL/L (ref 94–109)
CO2 SERPL-SCNC: 27 MMOL/L (ref 20–32)
COLOR UR AUTO: YELLOW
CREAT SERPL-MCNC: 5.9 MG/DL (ref 0.66–1.25)
GFR SERPL CREATININE-BSD FRML MDRD: 10 ML/MIN/1.7M2
GLUCOSE SERPL-MCNC: 142 MG/DL (ref 70–99)
GLUCOSE UR STRIP-MCNC: NEGATIVE MG/DL
HCT VFR BLD AUTO: 30.6 % (ref 40–53)
HGB BLD-MCNC: 9.7 G/DL (ref 13.3–17.7)
HGB UR QL STRIP: NEGATIVE
HYALINE CASTS #/AREA URNS LPF: 1 /LPF (ref 0–2)
KETONES UR STRIP-MCNC: NEGATIVE MG/DL
LEUKOCYTE ESTERASE UR QL STRIP: NEGATIVE
NITRATE UR QL: NEGATIVE
PH UR STRIP: 5 PH (ref 5–7)
PHOSPHATE SERPL-MCNC: 6.1 MG/DL (ref 2.5–4.5)
POTASSIUM SERPL-SCNC: 3.2 MMOL/L (ref 3.4–5.3)
RBC #/AREA URNS AUTO: 1 /HPF (ref 0–2)
SODIUM SERPL-SCNC: 138 MMOL/L (ref 133–144)
SP GR UR STRIP: 1.01 (ref 1–1.03)
SQUAMOUS #/AREA URNS AUTO: <1 /HPF (ref 0–1)
URN SPEC COLLECT METH UR: ABNORMAL
UROBILINOGEN UR STRIP-MCNC: 0 MG/DL (ref 0–2)
WBC #/AREA URNS AUTO: 1 /HPF (ref 0–2)

## 2017-05-03 PROCEDURE — 81001 URINALYSIS AUTO W/SCOPE: CPT

## 2017-05-03 PROCEDURE — 80048 BASIC METABOLIC PNL TOTAL CA: CPT | Performed by: INTERNAL MEDICINE

## 2017-05-03 PROCEDURE — 85014 HEMATOCRIT: CPT | Performed by: INTERNAL MEDICINE

## 2017-05-03 PROCEDURE — 84100 ASSAY OF PHOSPHORUS: CPT | Performed by: INTERNAL MEDICINE

## 2017-05-03 PROCEDURE — 85018 HEMOGLOBIN: CPT | Performed by: INTERNAL MEDICINE

## 2017-05-03 PROCEDURE — 63400005 ZZH RX 634: Mod: ZF | Performed by: INTERNAL MEDICINE

## 2017-05-03 PROCEDURE — 36415 COLL VENOUS BLD VENIPUNCTURE: CPT | Performed by: INTERNAL MEDICINE

## 2017-05-03 PROCEDURE — 99213 OFFICE O/P EST LOW 20 MIN: CPT | Mod: ZF

## 2017-05-03 PROCEDURE — 96372 THER/PROPH/DIAG INJ SC/IM: CPT

## 2017-05-03 RX ORDER — PREDNISONE 10 MG/1
5 TABLET ORAL DAILY
Qty: 2 TABLET | Refills: 0
Start: 2017-05-03 | End: 2017-05-06

## 2017-05-03 RX ADMIN — DARBEPOETIN ALFA 100 MCG: 100 INJECTION, SOLUTION INTRAVENOUS; SUBCUTANEOUS at 14:40

## 2017-05-03 ASSESSMENT — PAIN SCALES - GENERAL: PAINLEVEL: NO PAIN (0)

## 2017-05-03 NOTE — NURSING NOTE
"Chief Complaint   Patient presents with     RECHECK     Kidney follow up       Initial /70 (BP Location: Left arm, Cuff Size: Adult Regular)  Pulse 88  Ht 1.753 m (5' 9\")  Wt 82.8 kg (182 lb 9.6 oz)  SpO2 99%  BMI 26.97 kg/m2 Estimated body mass index is 26.97 kg/(m^2) as calculated from the following:    Height as of this encounter: 1.753 m (5' 9\").    Weight as of this encounter: 82.8 kg (182 lb 9.6 oz).  Medication Reconciliation: complete    "

## 2017-05-03 NOTE — TELEPHONE ENCOUNTER
Anemia Management Note  SUBJECTIVE/OBJECTIVE:  Referred by Dr. Brice Caraballo on 2015.  Primary Diagnosis: Anemia in Chronic Kidney Disease (N18.5 D63.1)   Secondary Diagnosis: Chronic Kidney Disease, Stage V (N18.5)  Hgb goal range: 9-10  Epo/Darbo: Aranesp 100 mcg every 2 weeks as needed - in clinic  RX order expires on 18  Iron regimen: Ferrous Sulfate TID - hold 17  Lab orders  on 2017 In Epic     Anemia Latest Ref Rng & Units 2017 2017 2017 2017 2017 2017 5/3/2017   ANASTASIYA Dose - - - 100 mcg - - - 100 mcg   Hemoglobin 13.3 - 17.7 g/dL 8.3(L) 8.4(L) - 9.1(L) 9.1(L) 9.2(L) 9.7(L)   IV Iron Dose - - - - - - - -   TSAT 15 - 46 % - - - 30 - - -   Ferritin 26 - 388 ng/mL - - - 1509(H) - - -     BP Readings from Last 3 Encounters:   17 126/70   17 128/68   17 147/81     Wt Readings from Last 2 Encounters:   17 182 lb 9.6 oz (82.8 kg)   17 184 lb 6.4 oz (83.6 kg)       Appt w/Dr Caraballo on 17    ASSESSMENT:  Hgb:at goal - received dose in clinic - recommend continue current regimen  TSat: at goal >30% Ferritin: Elevated (>1000ng/mL)    PLAN:  Dosed with aranesp and RTC for hgb, ferritin and iron labs then aranesp if needed in 2 week(s)    Orders needed to be renewed (for next follow-up date) in [facility]: None    Iron labs due:  17    Plan discussed with:  Murray   Plan provided by:  Lakisha    NEXT FOLLOW-UP DATE:  17    Anemia Management Service  Zelda Rich,EllisD and Rosa Marcial CPhT  Phone: [phone]  Fax: [phone]

## 2017-05-03 NOTE — MR AVS SNAPSHOT
After Visit Summary   5/3/2017    Murray Nicholson    MRN: [id number]           Patient Information     Date Of Birth          1955        Visit Information        Provider Department      5/3/2017 2:00 PM Patricia Cook NP [facility] Nephrology        Today's Diagnoses     Anemia in stage 5 chronic kidney disease (H)    -  1    CKD (chronic kidney disease) stage 5, GFR less than 15 ml/min (H)        Gout, unspecified cause, unspecified chronicity, unspecified site          Care Instructions    1. Restart KCL 20 meq daily  2. Cut the Prednisone in half for 3 dys, then stop  3. Continue Allopurinol at current dose  4. RTC in 2 wks to see Dr Caraballo with labs prior  5.  will help schedule appts with Cardiology for post hospital acute HF exacerbation and Rheumatology for gout f/u        Follow-ups after your visit        Your next 10 appointments already scheduled     May 11, 2017  4:30 PM CDT   (Arrive by 4:15 PM)   Return Visit with VERONICA Awan [facility] Rheumatology ([facility])    [address] [place] [id number]   [phone]            May 17, 2017 11:30 AM CDT   Lab with  LAB   [facility] Lab ([facility])    [address] [place] [id number]   [phone]            May 17, 2017 12:30 PM CDT   (Arrive by 12:00 PM)   Return Visit with Brice Caraballo MD   [facility] Nephrology ([facility])    [address] [place] [id number]   [phone]            Jul 12, 2017 12:00 PM CDT   Lab with  LAB   [facility] Lab ([facility])    [address] [place] [id number]   [phone]            Jul 12, 2017  1:00 PM CDT   (Arrive by 12:30 PM)   Return Visit with Brice Caraballo MD   [facility] Nephrology ([facility])    [address] [place] [address]  "[facility] [address] [phone]              Who to contact     If you have questions or need follow up information about today's clinic visit or your schedule please contact [facility] NEPHROLOGY directly at [phone].  Normal or non-critical lab and imaging results will be communicated to you by [employer]hart, letter or phone within 4 business days after the clinic has received the results. If you do not hear from us within 7 days, please contact the clinic through [employer]hart or phone. If you have a critical or abnormal lab result, we will notify you by phone as soon as possible.  Submit refill requests through [employer] or call your pharmacy and they will forward the refill request to us. Please allow 3 business days for your refill to be completed.          Additional Information About Your Visit        [employer] Information     [employer] gives you secure access to your electronic health record. If you see a primary care provider, you can also send messages to your care team and make appointments. If you have questions, please call your primary care clinic.  If you do not have a primary care provider, please call [phone] and they will assist you.        Care EveryWhere ID     This is your Care EveryWhere ID. This could be used by other organizations to access your [facility] medical records  [id number]        Your Vitals Were     Pulse Height Pulse Oximetry BMI (Body Mass Index)          88 1.753 m (5' 9\") 99% 26.97 kg/m2         Blood Pressure from Last 3 Encounters:   05/03/17 126/70   04/28/17 128/68   04/19/17 147/81    Weight from Last 3 Encounters:   05/03/17 82.8 kg (182 lb 9.6 oz)   04/28/17 83.6 kg (184 lb 6.4 oz)   04/19/17 87.2 kg (192 lb 3.2 oz)              We Performed the Following     Hematocrit     Hemoglobin     Treatment Conditions     Vital signs          Today's Medication Changes          These changes are accurate as of: 5/3/17  3:01 PM.  If you have any questions, ask your nurse or " doctor.               These medicines have changed or have updated prescriptions.        Dose/Directions    predniSONE 10 MG tablet   Commonly known as:  DELTASONE   This may have changed:    - how much to take  - additional instructions   Used for:  Gout, unspecified cause, unspecified chronicity, unspecified site   Changed by:  Patricia Cook NP        Dose:  5 mg   Take 0.5 tablets (5 mg) by mouth daily for 3 days Take 0.5 tablet x 3 dys, then discontinue   Quantity:  2 tablet   Refills:  0         Stop taking these medicines if you haven't already. Please contact your care team if you have questions.     amLODIPine 5 MG tablet   Commonly known as:  NORVASC   Stopped by:  Patricia Cook NP           azelastine-fluticasone 137-50 MCG/ACT nasal spray   Commonly known as:  DYMISTA   Stopped by:  Patricia Cook NP                Where to get your medicines      Some of these will need a paper prescription and others can be bought over the counter.  Ask your nurse if you have questions.     You don't need a prescription for these medications     predniSONE 10 MG tablet                Primary Care Provider Office Phone # Fax #    Yahir Turcios [phone]       [place] [id number]        Thank you!     Thank you for choosing [facility] NEPHROLOGY  for your care. Our goal is always to provide you with excellent care. Hearing back from our patients is one way we can continue to improve our services. Please take a few minutes to complete the written survey that you may receive in the mail after your visit with us. Thank you!             Your Updated Medication List - Protect others around you: Learn how to safely use, store and throw away your medicines at www.disposemymeds.org.          This list is accurate as of: 5/3/17  3:01 PM.  Always use your most recent med list.                   Brand Name Dispense Instructions for use    acetaminophen  325 MG tablet    TYLENOL    100 tablet    Take 1-2 tablets (325-650 mg) by mouth every 4 hours as needed for mild pain Do not take more than 10 tablets in 24 hours       albuterol 108 (90 BASE) MCG/ACT Inhaler    PROAIR HFA/PROVENTIL HFA/VENTOLIN HFA    1 Inhaler    Inhale 2 puffs into the lungs every 6 hours as needed for shortness of breath / dyspnea or wheezing       allopurinol 100 MG tablet    ZYLOPRIM    30 tablet    Take 0.5 tablets (50 mg) by mouth daily       aspirin 81 MG tablet      Take 1 tablet (81 mg) by mouth at bedtime       atorvastatin 40 MG tablet    LIPITOR    90 tablet    Take 1 tablet (40 mg) by mouth daily       darbepoetin emily 100 MCG/0.5ML injection    ARANESP (ALBUMIN FREE)    0.5 mL    Inject 0.5 mLs (100 mcg) Subcutaneous every 14 days As needed for hgb<10g/dL.  If Hgb increases >1 point in 2 weeks (if blood transfusion given, use hgb PRIOR to this), SYSTOLIC BP > 180 mmHg or hgb>=10g/dL, HOLD DOSE. Dose must be within 1 week of Hgb.  Per anemia protocol with Brice Caraballo MD       docusate sodium 100 MG capsule    COLACE    60 capsule    Take 1 capsule (100 mg) by mouth 2 times daily       furosemide 80 MG tablet    LASIX    120 tablet    Take 1 tablet (80 mg) by mouth 2 times daily In the morning and at 3 PM       glipiZIDE 5 MG tablet    GLUCOTROL    90 tablet    Take 1 tablet by mouth. TAKE 1 TABLET BY MOUTH DAILY BEFORE A MEAL       hydrALAZINE 50 MG tablet    APRESOLINE    270 tablet    Take 1 tablet (50 mg) by mouth 3 times daily       isosorbide mononitrate 60 MG 24 hr tablet    IMDUR    90 tablet    Take 1 tablet (60 mg) by mouth daily       loratadine 10 MG tablet    CLARITIN     Take 10 mg by mouth daily as needed Reported on 5/3/2017       losartan 100 MG tablet    COZAAR    90 tablet    Take 1 tablet (100 mg) by mouth daily       metolazone 2.5 MG tablet    ZAROXOLYN    30 tablet    Take 1 tablet (2.5 mg) by mouth daily       metoprolol 100 MG 24 hr tablet    TOPROL XL    90  tablet    Take 1 tablet (100 mg) by mouth daily       omeprazole 20 MG CR capsule    priLOSEC     Take 20 mg by mouth daily       potassium chloride SA 20 MEQ CR tablet    K-DUR/KLOR-CON M    90 tablet    Take 1 tablet (20 mEq) by mouth daily       predniSONE 10 MG tablet    DELTASONE    2 tablet    Take 0.5 tablets (5 mg) by mouth daily for 3 days Take 0.5 tablet x 3 dys, then discontinue       sodium bicarbonate 650 MG tablet     180 tablet    Take 2 tablets (1,300 mg) by mouth 3 times daily       VITAMIN D3 PO      Take 5,000 Units by mouth daily

## 2017-05-03 NOTE — PROGRESS NOTES
Nephrology Clinic Visit 5/3/17     Assessment and Plan:      1. CKD5 - Creat has stabilized in the 6 range. Creat 5.9 today down from peak of 6.9 on 4/8/17 following reinstitution of his antihypertensives and diuresis.   Etiology of CKD is Diabetic Nephropathy. Followed by Dr Brice Caraballo. Baseline creat had been in the ~4-5. Has been in the 6's since admission on 4/8/17 in setting of Severe cardiomyopathy/HF exacerbation.   - RRT of choice is PD. Dr Donovan did d/w Transplant surgery during hospital admission but given severe cardiomyopathy patient was not felt to be appropriate candidate for surgery. Will have to pursue as outpt when more medically optimized.   - No current indication for dialysis.   - BUN is very elevated given steroids  - Urine protein 0.93 g/gCr. Improved from 1.7 g/gCr 5/15)  - Reinforced need for uninterrupted medication administration      2. Volume status - Improved. Edema has resolved. No dyspnea/cough. Home -130/. Verified today with home blood pressure monitor. Clinic blood pressures today 120/70 x 3.  Weight today 83 kg. Admission weight 89 kg. Weight on 4/13/17 84.1 kg.   - Continue Furosemide 80 mg po bid  - Continue Metolazone 2.5 mg qd      3. Systolic heart failure/aortic stenosis/aortic aneurysm -   - Echo 4/10 showed worsening of heart failure with EF of 20%  - Current med regimen includes: Hydralazine, Imdur, Losartan, Toprol, diuretics  - Cardiology consulted as inpatient  - Needs to return to his primary Cardiologist for hospital f/u. He will not be able to proceed with PD catheter placement, transplant evaluation w/o clearance from Cards. We will assist with scheduling today      4. HTN - Excellent control w/SBP in the 120/ range.    - Continue current regimen: Lasix 80 mg bid, Hydralazine 50 mg TID, Losartan 100 mg qd, Metolazone 2.5 mg qd, Metoprolol 100 mg qd, Imdur 60 mg qd  - ACE stopped and Losartan started due to its uricosuric properties .       5. Electrolytes  - K 3.2. Was not taking his KCL   - Resume KCL 20 meq qd       6. Acid base - Acidosis improving with bicarb replacement. Up to 27 today. Etiology CKD5.   - Continue Na Bicarb 1300 mg TID       7. BMD - Ca corrected 7.4, Phos 4.1, Vit D level is 17   - Continue Renvela 800 mg TID  - Continue Cholecalciferol 5000 U qd.       8. Anemia - Hgb 9.7.    - Continue Aranesp 60 meq q 14 dys. Given dose today.    - Iron studies 4/19/17: Ferritin 1509, Fe 63, Tsat 30%.   - Started iron bid 4/11/17.       9. Gouty arthritis, right hip - UA 15.1,  during hospital admission. Started on Prednisone 40 mg qd x 5 dys. Began taper at our last visit, but remains on Pred 10 mg qd. UA level remains elevated at 17.9 as of 4/28. Only on allopurinol 50 mg qd. Feeling improved  - Decrease Pred to 5 mg qd x 3 dys, then d/c  - We will schedule Rheumatology f/u for patient, likely needs increase in Allopurinol dose     10. Disposition - Patient will f/u in clinic in 2 wks with Dr Caraballo with labs prior. We will assist with scheduling Cardiology and Rheumatology f/u appts     Assessment and plan was discussed with patient and he voiced his understanding and agreement.     Reason for Visit:  CKD5, HTN follow up     HPI:  Murray Nicholson is a 62 year old year old male admitted on 4/8/17 with dyspnea/cough and found to have volume overload/Heart failure exacerbation with worsening renal function in setting of drug holiday. PMH includes Aortic aneurysm, bicuspid aortic valve, Systolic heart failure, CKD 4/5, T2DM. Creat peaked at 6.9 and declined to 6.0 on day of discharge. He lost 5 kg through diuresis. Blood pressures were in the 120-130/ range. He was also diagnosed with acute gout in right hip and started on Prednisone.   Patient reports feeling well today but having difficulty scheduling f/u appts. He remains on Prednisone 10 mg qd for gout with resolution of hip pain. He brings in home blood pressure machine and it is found to be accurate.       Home BP: 120-130/     Baseline Cr: 4-5     ROS:  Patient notes stability of right hip pain. Currently on Pred 10 mg qd. Home blood pressures in the 120/ range. No edema. Has not yet returned to work. Denies CP, dyspnea. His cough has resolved off the ACE. No difficulty voiding. Appetite is good.      Active Medical Problems:     CKD5  HTN  Systolic Heart Failure  Gout  GERD  HLD  Anemia of chronic disease  Tubular adenoma  MGUS  T2DM     Personal Hx:   Single, employed in clothing sales, [place]. Son lives near patient and is helpful. Mother  yesterday     Allergies:  Allergies   Allergen Reactions     Cats      Throat tightness     Penicillins Hives     Seasonal Allergies      rhinitis     Shrimp      Throat closes        Medications:  Prior to Admission medications    Medication Sig Start Date End Date Taking? Authorizing Provider   predniSONE (DELTASONE) 10 MG tablet Take 0.5 tablets (5 mg) by mouth daily for 3 days Take 0.5 tablet x 3 dys, then discontinue 5/3/17 5/6/17 Yes JoeyPatricia dupont, NP   sodium bicarbonate 650 MG tablet Take 2 tablets (1,300 mg) by mouth 3 times daily 17  Yes Leia De Leon APRN CNP   atorvastatin (LIPITOR) 40 MG tablet Take 1 tablet (40 mg) by mouth daily 17  Yes Leia De Leon APRN CNP   isosorbide mononitrate (IMDUR) 60 MG 24 hr tablet Take 1 tablet (60 mg) by mouth daily 17  Yes Leia De Leon APRN CNP   metoprolol (TOPROL XL) 100 MG 24 hr tablet Take 1 tablet (100 mg) by mouth daily 17  Yes Leia De Leon APRN CNP   losartan (COZAAR) 100 MG tablet Take 1 tablet (100 mg) by mouth daily 17  Yes Leia De Leon APRN CNP   hydrALAZINE (APRESOLINE) 50 MG tablet Take 1 tablet (50 mg) by mouth 3 times daily 17  Yes Leia De Leon APRN CNP   acetaminophen (TYLENOL) 325 MG tablet Take 1-2 tablets (325-650 mg) by mouth every 4 hours as needed for mild pain Do not take more than 10 tablets in 24  hours 4/13/17  Yes Km Muniz MD   metolazone (ZAROXOLYN) 2.5 MG tablet Take 1 tablet (2.5 mg) by mouth daily 4/13/17  Yes Km Muniz MD   glipiZIDE (GLUCOTROL) 5 MG tablet Take 1 tablet by mouth. TAKE 1 TABLET BY MOUTH DAILY BEFORE A MEAL 4/14/17  Yes Km Muniz MD   furosemide (LASIX) 80 MG tablet Take 1 tablet (80 mg) by mouth 2 times daily In the morning and at 3 PM 4/13/17  Yes Km Muniz MD   allopurinol (ZYLOPRIM) 100 MG tablet Take 0.5 tablets (50 mg) by mouth daily 4/13/17  Yes Km Muniz MD   omeprazole (PRILOSEC) 20 MG CR capsule Take 20 mg by mouth daily   Yes Unknown, Entered By History   darbepoetin emily (ARANESP, ALBUMIN FREE,) 100 MCG/0.5ML injection Inject 0.5 mLs (100 mcg) Subcutaneous every 14 days As needed for hgb<10g/dL.  If Hgb increases >1 point in 2 weeks (if blood transfusion given, use hgb PRIOR to this), SYSTOLIC BP > 180 mmHg or hgb>=10g/dL, HOLD DOSE. Dose must be within 1 week of Hgb.  Per anemia protocol with Brice Caraballo MD 4/5/17  Yes Brice Caraballo MD   albuterol (PROAIR HFA/PROVENTIL HFA/VENTOLIN HFA) 108 (90 BASE) MCG/ACT Inhaler Inhale 2 puffs into the lungs every 6 hours as needed for shortness of breath / dyspnea or wheezing 12/1/16  Yes Amelie Cross PA-C   Cholecalciferol (VITAMIN D3 PO) Take 5,000 Units by mouth daily   Yes Reported, Patient   ASPIRIN 81 MG OR TABS Take 1 tablet (81 mg) by mouth at bedtime   Yes Reported, Patient   potassium chloride SA (K-DUR/KLOR-CON M) 20 MEQ CR tablet Take 1 tablet (20 mEq) by mouth daily  Patient not taking: Reported on 5/3/2017 4/28/17   Leia De Leon APRN CNP   docusate sodium (COLACE) 100 MG capsule Take 1 capsule (100 mg) by mouth 2 times daily  Patient not taking: Reported on 5/3/2017 4/13/17   Km Muniz MD   loratadine (CLARITIN) 10 MG tablet Take 10 mg by mouth daily as needed Reported on 5/3/2017    Reported, Patient       Vitals:  /70 (BP  "Location: Left arm, Cuff Size: Adult Regular)  Pulse 88  Ht 1.753 m (5' 9\")  Wt 82.8 kg (182 lb 9.6 oz)  SpO2 99%  BMI 26.97 kg/m2    Exam:  GENERAL APPEARANCE: Well groomed, NAD  PULM: lungs CTA. Breathing is non labored.   CV: RRR  -edema - no edema  GI: soft, NT, Non distended.   NEURO: Alert, oriented    Results:  Results for SANCHEZ MCNEIL (MRN [id number]) as of 5/3/2017 14:19   Ref. Range 5/3/2017 13:39   Sodium Latest Ref Range: 133 - 144 mmol/L 138   Potassium Latest Ref Range: 3.4 - 5.3 mmol/L 3.2 (L)   Chloride Latest Ref Range: 94 - 109 mmol/L 95   Carbon Dioxide Latest Ref Range: 20 - 32 mmol/L 27   Urea Nitrogen Latest Ref Range: 7 - 30 mg/dL 173 (H)   Creatinine Latest Ref Range: 0.66 - 1.25 mg/dL 5.90 (H)   GFR Estimate Latest Ref Range: >60 mL/min/1.7m2 10 (L)   GFR Estimate If Black Latest Ref Range: >60 mL/min/1.7m2 12 (L)   Calcium Latest Ref Range: 8.5 - 10.1 mg/dL 7.4 (L)   Anion Gap Latest Ref Range: 3 - 14 mmol/L 17 (H)               "

## 2017-05-03 NOTE — NURSING NOTE
Frequency: 14 days  Most recent or today's HGB: 9.7   Date: 5/3/17  Date of lat dose: 17  HGB associated with last dose given: 9.1    Blood Pressure:126/70    Diagnosis: anemia in stage 5 Ckd, Ckd stage 5    Ordered by: Brice Caraballo MD  VIS Offered: yes    Double Checked by: anahy jacobson    See MAR for administration details    Pt's first name, last name and  verified prior to medication administration, injection given without complications or questions.   RUPAL GE CMA

## 2017-05-03 NOTE — PATIENT INSTRUCTIONS
1. Restart KCL 20 meq daily  2. Cut the Prednisone in half for 3 dys, then stop  3. Continue Allopurinol at current dose  4. RTC in 2 wks to see Dr Caraballo with labs prior  5.  will help schedule appts with Cardiology for post hospital acute HF exacerbation and Rheumatology for gout f/u

## 2017-05-03 NOTE — LETTER
5/3/2017       RE: Murray Nicholson  [address]  [place] [address]     Dear Colleague,    Thank you for referring your patient, Murray Nicholson, to the [facility] NEPHROLOGY at [facility]. Please see a copy of my visit note below.    Nephrology Clinic Visit 5/3/17     Assessment and Plan:      1. CKD5 - Creat has stabilized in the 6 range. Creat 5.9 today down from peak of 6.9 on 4/8/17 following reinstitution of his antihypertensives and diuresis.   Etiology of CKD is Diabetic Nephropathy. Followed by Dr Brice Caraballo. Baseline creat had been in the ~4-5. Has been in the 6's since admission on 4/8/17 in setting of Severe cardiomyopathy/HF exacerbation.   - RRT of choice is PD. Dr Donovan did d/w Transplant surgery during hospital admission but given severe cardiomyopathy patient was not felt to be appropriate candidate for surgery. Will have to pursue as outpt when more medically optimized.   - No current indication for dialysis.   - BUN is very elevated given steroids  - Urine protein 0.93 g/gCr. Improved from 1.7 g/gCr 5/15)  - Reinforced need for uninterrupted medication administration      2. Volume status - Improved. Edema has resolved. No dyspnea/cough. Home -130/. Verified today with home blood pressure monitor. Clinic blood pressures today 120/70 x 3.  Weight today 83 kg. Admission weight 89 kg. Weight on 4/13/17 84.1 kg.   - Continue Furosemide 80 mg po bid  - Continue Metolazone 2.5 mg qd      3. Systolic heart failure/aortic stenosis/aortic aneurysm -   - Echo 4/10 showed worsening of heart failure with EF of 20%  - Current med regimen includes: Hydralazine, Imdur, Losartan, Toprol, diuretics  - Cardiology consulted as inpatient  - Needs to return to his primary Cardiologist for hospital f/u. He will not be able to proceed with PD catheter placement, transplant evaluation w/o clearance from Cards. We will assist with scheduling today      4. HTN -  Excellent control w/SBP in the 120/ range.    - Continue current regimen: Lasix 80 mg bid, Hydralazine 50 mg TID, Losartan 100 mg qd, Metolazone 2.5 mg qd, Metoprolol 100 mg qd, Imdur 60 mg qd  - ACE stopped and Losartan started due to its uricosuric properties .       5. Electrolytes - K 3.2. Was not taking his KCL   - Resume KCL 20 meq qd       6. Acid base - Acidosis improving with bicarb replacement. Up to 27 today. Etiology CKD5.   - Continue Na Bicarb 1300 mg TID       7. BMD - Ca corrected 7.4, Phos 4.1, Vit D level is 17   - Continue Renvela 800 mg TID  - Continue Cholecalciferol 5000 U qd.       8. Anemia - Hgb 9.7.    - Continue Aranesp 60 meq q 14 dys. Given dose today.    - Iron studies 4/19/17: Ferritin 1509, Fe 63, Tsat 30%.   - Started iron bid 4/11/17.       9. Gouty arthritis, right hip - UA 15.1,  during hospital admission. Started on Prednisone 40 mg qd x 5 dys. Began taper at our last visit, but remains on Pred 10 mg qd. UA level remains elevated at 17.9 as of 4/28. Only on allopurinol 50 mg qd. Feeling improved  - Decrease Pred to 5 mg qd x 3 dys, then d/c  - We will schedule Rheumatology f/u for patient, likely needs increase in Allopurinol dose     10. Disposition - Patient will f/u in clinic in 2 wks with Dr Caraballo with labs prior. We will assist with scheduling Cardiology and Rheumatology f/u appts     Assessment and plan was discussed with patient and he voiced his understanding and agreement.     Reason for Visit:  CKD5, HTN follow up     HPI:  Murray Nicholson is a 62 year old year old male admitted on 4/8/17 with dyspnea/cough and found to have volume overload/Heart failure exacerbation with worsening renal function in setting of drug holiday. PMH includes Aortic aneurysm, bicuspid aortic valve, Systolic heart failure, CKD 4/5, T2DM. Creat peaked at 6.9 and declined to 6.0 on day of discharge. He lost 5 kg through diuresis. Blood pressures were in the 120-130/ range. He was also  diagnosed with acute gout in right hip and started on Prednisone.   Patient reports feeling well today but having difficulty scheduling f/u appts. He remains on Prednisone 10 mg qd for gout with resolution of hip pain. He brings in home blood pressure machine and it is found to be accurate.      Home BP: 120-130/     Baseline Cr: 4-5     ROS:  Patient notes stability of right hip pain. Currently on Pred 10 mg qd. Home blood pressures in the 120/ range. No edema. Has not yet returned to work. Denies CP, dyspnea. His cough has resolved off the ACE. No difficulty voiding. Appetite is good.      Active Medical Problems:     CKD5  HTN  Systolic Heart Failure  Gout  GERD  HLD  Anemia of chronic disease  Tubular adenoma  MGUS  T2DM     Personal Hx:   Single, employed in clothing sales, [place]. Son lives near patient and is helpful. Mother  yesterday     Allergies:  Allergies   Allergen Reactions     Cats      Throat tightness     Penicillins Hives     Seasonal Allergies      rhinitis     Shrimp      Throat closes        Medications:  Prior to Admission medications    Medication Sig Start Date End Date Taking? Authorizing Provider   predniSONE (DELTASONE) 10 MG tablet Take 0.5 tablets (5 mg) by mouth daily for 3 days Take 0.5 tablet x 3 dys, then discontinue 5/3/17 5/6/17 Yes Patricia Cook, NP   sodium bicarbonate 650 MG tablet Take 2 tablets (1,300 mg) by mouth 3 times daily 17  Yes Leia De Leon APRN CNP   atorvastatin (LIPITOR) 40 MG tablet Take 1 tablet (40 mg) by mouth daily 17  Yes Leia De Leon APRN CNP   isosorbide mononitrate (IMDUR) 60 MG 24 hr tablet Take 1 tablet (60 mg) by mouth daily 17  Yes Leia De Leon APRN CNP   metoprolol (TOPROL XL) 100 MG 24 hr tablet Take 1 tablet (100 mg) by mouth daily 17  Yes Leia De Leon APRN CNP   losartan (COZAAR) 100 MG tablet Take 1 tablet (100 mg) by mouth daily 17  Yes Leia De Leon APRN  CNP   hydrALAZINE (APRESOLINE) 50 MG tablet Take 1 tablet (50 mg) by mouth 3 times daily 4/28/17  Yes Leia De Leon APRN CNP   acetaminophen (TYLENOL) 325 MG tablet Take 1-2 tablets (325-650 mg) by mouth every 4 hours as needed for mild pain Do not take more than 10 tablets in 24 hours 4/13/17  Yes Km Muniz MD   metolazone (ZAROXOLYN) 2.5 MG tablet Take 1 tablet (2.5 mg) by mouth daily 4/13/17  Yes Km Muniz MD   glipiZIDE (GLUCOTROL) 5 MG tablet Take 1 tablet by mouth. TAKE 1 TABLET BY MOUTH DAILY BEFORE A MEAL 4/14/17  Yes Km Muniz MD   furosemide (LASIX) 80 MG tablet Take 1 tablet (80 mg) by mouth 2 times daily In the morning and at 3 PM 4/13/17  Yes Km Muniz MD   allopurinol (ZYLOPRIM) 100 MG tablet Take 0.5 tablets (50 mg) by mouth daily 4/13/17  Yes Km Muniz MD   omeprazole (PRILOSEC) 20 MG CR capsule Take 20 mg by mouth daily   Yes Unknown, Entered By History   darbepoetin emily (ARANESP, ALBUMIN FREE,) 100 MCG/0.5ML injection Inject 0.5 mLs (100 mcg) Subcutaneous every 14 days As needed for hgb<10g/dL.  If Hgb increases >1 point in 2 weeks (if blood transfusion given, use hgb PRIOR to this), SYSTOLIC BP > 180 mmHg or hgb>=10g/dL, HOLD DOSE. Dose must be within 1 week of Hgb.  Per anemia protocol with Brice Caraballo MD 4/5/17  Yes Brice Caraballo MD   albuterol (PROAIR HFA/PROVENTIL HFA/VENTOLIN HFA) 108 (90 BASE) MCG/ACT Inhaler Inhale 2 puffs into the lungs every 6 hours as needed for shortness of breath / dyspnea or wheezing 12/1/16  Yes Amelie Cross PA-C   Cholecalciferol (VITAMIN D3 PO) Take 5,000 Units by mouth daily   Yes Reported, Patient   ASPIRIN 81 MG OR TABS Take 1 tablet (81 mg) by mouth at bedtime   Yes Reported, Patient   potassium chloride SA (K-DUR/KLOR-CON M) 20 MEQ CR tablet Take 1 tablet (20 mEq) by mouth daily  Patient not taking: Reported on 5/3/2017 4/28/17   Leia De Leon APRN CNP   docusate sodium  "(COLACE) 100 MG capsule Take 1 capsule (100 mg) by mouth 2 times daily  Patient not taking: Reported on 5/3/2017 4/13/17   Km Muniz MD   loratadine (CLARITIN) 10 MG tablet Take 10 mg by mouth daily as needed Reported on 5/3/2017    Reported, Patient       Vitals:  /70 (BP Location: Left arm, Cuff Size: Adult Regular)  Pulse 88  Ht 1.753 m (5' 9\")  Wt 82.8 kg (182 lb 9.6 oz)  SpO2 99%  BMI 26.97 kg/m2    Exam:  GENERAL APPEARANCE: Well groomed, NAD  PULM: lungs CTA. Breathing is non labored.   CV: RRR  -edema - no edema  GI: soft, NT, Non distended.   NEURO: Alert, oriented    Results:  Results for SANCHEZ MCNEIL (MRN [id number]) as of 5/3/2017 14:19   Ref. Range 5/3/2017 13:39   Sodium Latest Ref Range: 133 - 144 mmol/L 138   Potassium Latest Ref Range: 3.4 - 5.3 mmol/L 3.2 (L)   Chloride Latest Ref Range: 94 - 109 mmol/L 95   Carbon Dioxide Latest Ref Range: 20 - 32 mmol/L 27   Urea Nitrogen Latest Ref Range: 7 - 30 mg/dL 173 (H)   Creatinine Latest Ref Range: 0.66 - 1.25 mg/dL 5.90 (H)   GFR Estimate Latest Ref Range: >60 mL/min/1.7m2 10 (L)   GFR Estimate If Black Latest Ref Range: >60 mL/min/1.7m2 12 (L)   Calcium Latest Ref Range: 8.5 - 10.1 mg/dL 7.4 (L)   Anion Gap Latest Ref Range: 3 - 14 mmol/L 17 (H)                 Again, thank you for allowing me to participate in the care of your patient.      Sincerely,    Patricia Cook NP      "

## 2017-05-05 ENCOUNTER — TELEPHONE (OUTPATIENT)
Dept: CARE COORDINATION | Facility: CLINIC | Age: 62
End: 2017-05-05

## 2017-05-05 ENCOUNTER — CARE COORDINATION (OUTPATIENT)
Dept: CARE COORDINATION | Facility: CLINIC | Age: 62
End: 2017-05-05

## 2017-05-05 NOTE — Clinical Note
Jairo Dupree- Please see my care coordination encounter in the chart for specifics of this request. Please edit this letter as appropriate and sign. He is sending us an DAVID so we can send this with visit notes and discharge summary to the company, but I can compile that if you just do the letter. Let me know if you have questions.    Kat

## 2017-05-05 NOTE — PROGRESS NOTES
Clinic Care Coordination Contact  OUTREACH    Referral Information:  Referral Source: Care Team  Reason for Contact: letter for work  Care Conference: No     Universal Utilization:   ED Visits in last year: 1  Hospital visits in last year: 2  Last PCP appointment: 04/28/17  Missed Appointments: 3  Concerns: no  Multiple Providers or Specialists: yes    Clinical Concerns:  Current Medical Concerns: Patient was recently admitted for fluid overload, acute on chronic kidney injury and developed gouty arthritis of Right hip. He was admitted on 4/8 and discharged 4/13.   Current Behavioral Concerns: none discussed.     Functional Status:  Mobility Status: Independent  Equipment Currently Used at Home: none  Transportation: not assessed      Psychosocial:  Current living arrangement:: Not Assessed  Financial/Insurance:  Marla.    Short term disability: Patient is requesting a letter to assist in receiving his short term disability benefits through 4/28. Currently he has only received the amount through 4/13. [employer] ([phone]) has requested a letter stating that patient needed to remain out of work until 4/28 (at which he was cleared to return by Leia De Leon) to recover. He states they also need copies of medical records indicating that he had issues ambulating and gaut in hip. These diagnoses are documented in his discharge summary from the hospital.   His claim number is [id number].   informed patient that an DAVID will need to be returned to clinic before this can be sent to [employer]. A link to DAVID was sent to patient via [employer].        Resources and Interventions:  Current Resources:     Advanced Care Plans/Directives on file:: No  Referrals Placed: Other      Patient/Caregiver understanding: Patient appreciative of follow up and coordination of hospital and this .  to route request to Leia De Leon and will update patient with status of letter.    Frequency of Care Coordination: as needed        Plan:   1.  [facility]  2. Patient to fax DAVID to clinic  3.  to f/u with patient once letter is sent    VIVIEN Hidalgo  Social Work Care Coordinator  [facility]  Ph: [phone]

## 2017-05-05 NOTE — PROGRESS NOTES
Clinic Care Coordination Contact  Care Team Conversations     received call from Sandra at the hospital. She states she had a voicemail from patient stating that he needs a letter excusing him from work until 4-28. She states there was a letter written for him for the time he was in the hospital. She states that inpatient provider would likely not do this and is wondering if this  can follow up with patient and Leia De Leon who saw him 4-28.    Kat Daily [facility]  Social Work Care Coordinator  [facility]  Ph: [phone]

## 2017-05-05 NOTE — LETTER
[facility] [address] [place] [address]    [phone]    May 5, 2017      RE: Murray Nicholson  : 55  Claim #: [id number]        To Whom it May Concern,    Murray Nicholson was hospitalized from 2017 to 2017 at the [facility]. Following discharge Murray required additional medical follow up and recovery time and was cleared to return to work on 2017.     Please find included his discharge summary from the hospital and follow up visit notes prior to his return to work on 2017. You may contact the clinic with any additional questions.        Sincerely,        VERONICA Aly CNP

## 2017-05-05 NOTE — LETTER
[facility]  [address] [place] [address]    Phone: [phone]  Fax: [phone]      Date:May, 9th 2017  To: [employer]  Fax Number: [phone]  Number of pages: 23    Message:   Requested documentation for claim # [id number]    Confidentiality Note: The information contained in this facsimile message is legally privileged and confidential information intended only for the of the entity named above. If the  of this message is not the intended recipient, you are hereby notified that any dissemination, distribution, or copy of this facsimile is strictly prohibited. If you have received this facsimile is error, please immediately notify me by phone and return the message to me at the above address via [employer]. Thank you.

## 2017-05-05 NOTE — TELEPHONE ENCOUNTER
Late Entry Social Work Phone Note May 5, 2017    SW received a voicemail from pt stating that [employer] needs updated information on his leave of absence from work.  Pt states he has had difficulty reaching Dr. Torres who completed his original FMLA form while hospitalized.  SW reviewed chart and pt had gotten an addended letter from the clinic regarding work ability.  SW called clinic DINH Stephens regarding this issue.  Clinic SW will call pt to follow up on this concern.    NIRAV Tyler, APSW  [address]   Pager: [phone]  Phone: [phone]

## 2017-05-06 ENCOUNTER — MYC REFILL (OUTPATIENT)
Dept: FAMILY MEDICINE | Facility: CLINIC | Age: 62
End: 2017-05-06

## 2017-05-06 DIAGNOSIS — E87.79 OTHER HYPERVOLEMIA: ICD-10-CM

## 2017-05-06 DIAGNOSIS — M10.9 GOUT, UNSPECIFIED CAUSE, UNSPECIFIED CHRONICITY, UNSPECIFIED SITE: ICD-10-CM

## 2017-05-06 DIAGNOSIS — E11.69 TYPE 2 DIABETES MELLITUS WITH OTHER SPECIFIED COMPLICATION (H): ICD-10-CM

## 2017-05-08 DIAGNOSIS — N18.5 CKD (CHRONIC KIDNEY DISEASE) STAGE 5, GFR LESS THAN 15 ML/MIN (H): Primary | ICD-10-CM

## 2017-05-08 RX ORDER — GLIPIZIDE 5 MG/1
TABLET ORAL
Qty: 90 TABLET | Refills: 0 | Status: SHIPPED | OUTPATIENT
Start: 2017-05-08 | End: 2017-08-22

## 2017-05-08 RX ORDER — ALLOPURINOL 100 MG/1
50 TABLET ORAL DAILY
Qty: 90 TABLET | Refills: 1 | Status: SHIPPED | OUTPATIENT
Start: 2017-05-08 | End: 2017-05-17

## 2017-05-08 RX ORDER — METOLAZONE 2.5 MG/1
2.5 TABLET ORAL DAILY
Qty: 90 TABLET | Refills: 1 | Status: ON HOLD | OUTPATIENT
Start: 2017-05-08 | End: 2017-06-30

## 2017-05-08 NOTE — NURSING NOTE
Labs per clinic 2A protocol.  Follow up/CKD 5  Last OV: 5/3/17-last saw LEWIS Garcia LPN  Nephrology  [facility]  [phone]

## 2017-05-08 NOTE — TELEPHONE ENCOUNTER
Message from MyChart:  Original authorizing provider: Km Muniz MD    Murray Nicholson would like a refill of the following medications:  metolazone (ZAROXOLYN) 2.5 MG tablet [Km Muniz MD]  glipiZIDE (GLUCOTROL) 5 MG tablet [Km Muniz MD]  allopurinol (ZYLOPRIM) 100 MG tablet [Km uMniz MD]    Preferred pharmacy: [id number] - [place], [place] - [address] AT [facility]    Comment:

## 2017-05-09 ENCOUNTER — OFFICE VISIT (OUTPATIENT)
Dept: FAMILY MEDICINE | Facility: CLINIC | Age: 62
End: 2017-05-09
Payer: COMMERCIAL

## 2017-05-09 VITALS
HEART RATE: 91 BPM | TEMPERATURE: 98.2 F | SYSTOLIC BLOOD PRESSURE: 128 MMHG | DIASTOLIC BLOOD PRESSURE: 71 MMHG | OXYGEN SATURATION: 98 %

## 2017-05-09 DIAGNOSIS — M25.50 MULTIPLE JOINT PAIN: Primary | ICD-10-CM

## 2017-05-09 PROCEDURE — 99214 OFFICE O/P EST MOD 30 MIN: CPT | Performed by: NURSE PRACTITIONER

## 2017-05-09 RX ORDER — HYDROCODONE BITARTRATE AND ACETAMINOPHEN 5; 325 MG/1; MG/1
1 TABLET ORAL EVERY 4 HOURS PRN
Qty: 15 TABLET | Refills: 0 | Status: ON HOLD | OUTPATIENT
Start: 2017-05-09 | End: 2017-06-22

## 2017-05-09 RX ORDER — PREDNISONE 10 MG/1
TABLET ORAL
Qty: 15 TABLET | Refills: 0 | Status: SHIPPED | OUTPATIENT
Start: 2017-05-09 | End: 2017-06-02

## 2017-05-09 NOTE — PROGRESS NOTES
"  SUBJECTIVE:                                                    Murray Nicholson is a 62 year old male who presents to clinic today for the following health issues:      Musculoskeletal problem/pain      Duration: on and off for the past three days but profoundly worse today.     Description  Murray has been on oral steroids since his hospitalization and titrated down/off as instructed. His last dose was 3 days ago.  When the steroid stopped, he developed left hip pain.  Today the pain is so bad \"It took me an hour to get here today.\"  The pain is worse with movement, walking, bending.   No fever or chills. No localized redness to the hip site.  His right hip is somewhat painful as well, but not as bad.  Tylenol isn't helping.  History of \"arthritis.\"   Murray reports that recently when he was hospitalized he may have \"the beginning of arthritis.\"  History of gout.  Appointment scheduled 5/14 with rheumatology.  Continuing on his allopurinol.  Per his nephrologist's recommendation, he \"weaned himself off of my prednisone.\"  He last took his steroid \"a couple days ago.\" He was on the 40mg per day for a week, then 20mg per day for a week, and then 10mg per day for a week.    Planning: to fly to [facility] tomorrow (returning 5/12) to attend his mother's memorial.      Intensity:  moderate, severe- very difficult to walk     Accompanying signs and symptoms: none    History  Previous similar problem: YES- flare up on left leg at hospital one month ago. Arthritis and gout   Previous evaluation:  Yes     Precipitating or alleviating factors:  Trauma or overuse: YES and NO   Aggravating factors include: sitting and walking    Therapies tried and outcome: rest/inactivity- tylenol isn't helping.       Past Medical History:   Diagnosis Date     (HFpEF) heart failure with preserved ejection fraction (H)      Allergic rhinitis, cause unspecified      Anemia of chronic kidney failure      Ascending aortic aneurysm (H)      Bicuspid " aortic valve      CAD (coronary artery disease)      Chronic kidney disease, stage 5 (H)      Congestive heart failure, unspecified      Dyslipidemia      Esophageal reflux      Hypersomnia with sleep apnea, unspecified      Hypertension      MGUS (monoclonal gammopathy of unknown significance)      SHEELA (obstructive sleep apnea)      Type 2 diabetes mellitus (H)      Current Outpatient Prescriptions   Medication Sig Dispense Refill     metolazone (ZAROXOLYN) 2.5 MG tablet Take 1 tablet (2.5 mg) by mouth daily 90 tablet 1     glipiZIDE (GLUCOTROL) 5 MG tablet Take 1 tablet by mouth. TAKE 1 TABLET BY MOUTH DAILY BEFORE A MEAL 90 tablet 0     allopurinol (ZYLOPRIM) 100 MG tablet Take 0.5 tablets (50 mg) by mouth daily 90 tablet 1     potassium chloride SA (K-DUR/KLOR-CON M) 20 MEQ CR tablet Take 1 tablet (20 mEq) by mouth daily 90 tablet 0     sodium bicarbonate 650 MG tablet Take 2 tablets (1,300 mg) by mouth 3 times daily 180 tablet 0     atorvastatin (LIPITOR) 40 MG tablet Take 1 tablet (40 mg) by mouth daily 90 tablet 3     isosorbide mononitrate (IMDUR) 60 MG 24 hr tablet Take 1 tablet (60 mg) by mouth daily 90 tablet 3     metoprolol (TOPROL XL) 100 MG 24 hr tablet Take 1 tablet (100 mg) by mouth daily 90 tablet 3     losartan (COZAAR) 100 MG tablet Take 1 tablet (100 mg) by mouth daily 90 tablet 0     hydrALAZINE (APRESOLINE) 50 MG tablet Take 1 tablet (50 mg) by mouth 3 times daily 270 tablet 3     acetaminophen (TYLENOL) 325 MG tablet Take 1-2 tablets (325-650 mg) by mouth every 4 hours as needed for mild pain Do not take more than 10 tablets in 24 hours 100 tablet 0     furosemide (LASIX) 80 MG tablet Take 1 tablet (80 mg) by mouth 2 times daily In the morning and at 3  tablet 0     docusate sodium (COLACE) 100 MG capsule Take 1 capsule (100 mg) by mouth 2 times daily 60 capsule 1     omeprazole (PRILOSEC) 20 MG CR capsule Take 20 mg by mouth daily       darbepoetin emily (ARANESP, ALBUMIN FREE,) 100  MCG/0.5ML injection Inject 0.5 mLs (100 mcg) Subcutaneous every 14 days As needed for hgb<10g/dL.  If Hgb increases >1 point in 2 weeks (if blood transfusion given, use hgb PRIOR to this), SYSTOLIC BP > 180 mmHg or hgb>=10g/dL, HOLD DOSE. Dose must be within 1 week of Hgb.  Per anemia protocol with Brice Caraballo MD 0.5 mL 99     albuterol (PROAIR HFA/PROVENTIL HFA/VENTOLIN HFA) 108 (90 BASE) MCG/ACT Inhaler Inhale 2 puffs into the lungs every 6 hours as needed for shortness of breath / dyspnea or wheezing 1 Inhaler 0     Cholecalciferol (VITAMIN D3 PO) Take 5,000 Units by mouth daily       loratadine (CLARITIN) 10 MG tablet Take 10 mg by mouth daily as needed Reported on 5/3/2017       ASPIRIN 81 MG OR TABS Take 1 tablet (81 mg) by mouth at bedtime       Social History   Substance Use Topics     Smoking status: Former Smoker     Packs/day: 1.00     Years: 19.00     Types: Cigarettes     Quit date: 8/18/1994     Smokeless tobacco: Never Used     Alcohol use 0.0 oz/week      Comment: 2 drinks per week if that       ROS:  12 point Review of systems negative except as stated above.    OBJECTIVE  :/71  Pulse 91  Temp 98.2  F (36.8  C) (Oral)  SpO2 98%  GENERAL APPEARANCE: healthy, alert and moderate to severe distress. Pale.   Aambulatory with a slow, limpy/uneven gait.  It appears that he is having difficulty with all movement.   MS: bilateral hips with reduced ROM, left worse than right.  CWMS intact to bilateral LE.  No LE edema.  SKIN: warm and dry  PSYCH: mentation appears normal and affect normal/bright but distressed  Good eye contact.      ASSESSMENT:  (M25.50) Multiple joint pain  (primary encounter diagnosis)  Comment:   Plan: predniSONE (DELTASONE) 10 MG tablet,         HYDROcodone-acetaminophen (NORCO) 5-325 MG per         tablet          I discussed and reviewed the patient's current symptoms with Dr. Turcios (PCP).  He agrees with the below plan of care:  The patient was restarted on an oral steroid,  "20mg per day for 5days and then 10mg per day.  This will get him through to his rheumatology appointment.  The patient will keep his rheumatology appointment.    Xrays/labs considered today, but do not appear indicated.    I discussed with Murray that I am worried about his level of pain and his upcoming travel.  He will \"travel with caution.\"  He will seek medical attention if needed.  He was appreciative.      Total: 30 min spent with the patient, >50% time spent face to face counseling regarding his pain, assessment, reviewing his history, consulting with Dr. Turcios, and coordinating follow up care.  "

## 2017-05-09 NOTE — NURSING NOTE
"Chief Complaint   Patient presents with     Hip Pain       Initial /71  Pulse 91  Temp 98.2  F (36.8  C) (Oral)  SpO2 98% Estimated body mass index is 26.97 kg/(m^2) as calculated from the following:    Height as of 5/3/17: 5' 9\" (1.753 m).    Weight as of 5/3/17: 182 lb 9.6 oz (82.8 kg).  Medication Reconciliation: complete    "

## 2017-05-09 NOTE — PATIENT INSTRUCTIONS
For your joint pain:  Take the prednisone 20mg per day for 5 days and then 10mg per day for 5 days.  Keep your scheduled appointment with rheumatology on 5/17.    Pain control:  You can take the vicodin (prescribed today) intermittently for severe pain.  Do not take extra tylenol at the same time as the vicodin has tylenol in it.

## 2017-05-09 NOTE — PROGRESS NOTES
Letter and records faxed to [employer] at fax number given by patient. Mychart message received from patient stating okay to release records.    Mychart message sent to patient stating records sent.    Patient will contact  with any additional questions.    Kat Daily [facility]  Social Work Care Coordinator  [facility]  Ph: [phone]

## 2017-05-09 NOTE — MR AVS SNAPSHOT
After Visit Summary   5/9/2017    Murray Nicholson    MRN: [id number]           Patient Information     Date Of Birth          1955        Visit Information        Provider Department      5/9/2017 1:40 PM Leia De Leon APRN CNP [facility]        Today's Diagnoses     Multiple joint pain    -  1      Care Instructions    For your joint pain:  Take the prednisone 20mg per day for 5 days and then 10mg per day for 5 days.  Keep your scheduled appointment with rheumatology on 5/17.    Pain control:  You can take the vicodin (prescribed today) intermittently for severe pain.  Do not take extra tylenol at the same time as the vicodin has tylenol in it.            Follow-ups after your visit        Your next 10 appointments already scheduled     May 17, 2017 11:30 AM CDT   Lab with  LAB    [facility] ([facility])    [address] [place] [id number]   [phone]            May 17, 2017 12:30 PM CDT   (Arrive by 12:00 PM)   Return Visit with Brice Caraballo MD   [facility] ([facility])    [address] [place] [address]   [phone]            May 17, 2017  2:00 PM CDT   (Arrive by 1:45 PM)   Return Visit with VERONICA Awan CNP   [facility] ([facility])    [address] [place] [id number]   [phone]            Jul 12, 2017 12:00 PM CDT   Lab with  LAB   [facility] ([facility])    [address] [place] [address]   [phone]            Jul 12, 2017  1:00 PM CDT   (Arrive by 12:30 PM)   Return Visit with rBice Caraballo MD   [facility] ([facility])    [address] [place] [address]   [phone]              Future tests that were ordered for you today     Open  Future Orders        Priority Expected Expires Ordered    Renal panel Routine 5/17/2017 8/6/2017 5/8/2017    CBC with platelets Routine 5/17/2017 8/6/2017 5/8/2017            Who to contact     If you have questions or need follow up information about today's clinic visit or your schedule please contact [facility] directly at [phone].  Normal or non-critical lab and imaging results will be communicated to you by makrhart, letter or phone within 4 business days after the clinic has received the results. If you do not hear from us within 7 days, please contact the clinic through [employer] or phone. If you have a critical or abnormal lab result, we will notify you by phone as soon as possible.  Submit refill requests through [employer] or call your pharmacy and they will forward the refill request to us. Please allow 3 business days for your refill to be completed.          Additional Information About Your Visit        makrhart Information     [employer] gives you secure access to your electronic health record. If you see a primary care provider, you can also send messages to your care team and make appointments. If you have questions, please call your primary care clinic.  If you do not have a primary care provider, please call [phone] and they will assist you.        Care EveryWhere ID     This is your Care EveryWhere ID. This could be used by other organizations to access your [place] medical records  [id number]        Your Vitals Were     Pulse Temperature Pulse Oximetry             91 98.2  F (36.8  C) (Oral) 98%          Blood Pressure from Last 3 Encounters:   05/09/17 128/71   05/03/17 126/70   04/28/17 128/68    Weight from Last 3 Encounters:   05/03/17 182 lb 9.6 oz (82.8 kg)   04/28/17 184 lb 6.4 oz (83.6 kg)   04/19/17 192 lb 3.2 oz (87.2 kg)              Today, you had the following     No orders found for display         Today's Medication Changes          These changes are  accurate as of: 5/9/17  2:17 PM.  If you have any questions, ask your nurse or doctor.               Start taking these medicines.        Dose/Directions    HYDROcodone-acetaminophen 5-325 MG per tablet   Commonly known as:  NORCO   Used for:  Multiple joint pain   Started by:  Leia De Leon APRN CNP        Dose:  1 tablet   Take 1 tablet by mouth every 4 hours as needed for pain   Quantity:  15 tablet   Refills:  0       predniSONE 10 MG tablet   Commonly known as:  DELTASONE   Used for:  Multiple joint pain   Started by:  Leia De Leon APRN CNP        Take 20 mg per day for 5 days and then 10mg per day for 5 days.   Quantity:  15 tablet   Refills:  0            Where to get your medicines      These medications were sent to [email or web address] - [place], [place] - [address], [place] [address]     Phone:  [phone]     predniSONE 10 MG tablet         Some of these will need a paper prescription and others can be bought over the counter.  Ask your nurse if you have questions.     Bring a paper prescription for each of these medications     HYDROcodone-acetaminophen 5-325 MG per tablet                Primary Care Provider Office Phone # Fax #    Yahir Turcios [phone]       [address] [address]        Thank you!     Thank you for choosing [facility]  for your care. Our goal is always to provide you with excellent care. Hearing back from our patients is one way we can continue to improve our services. Please take a few minutes to complete the written survey that you may receive in the mail after your visit with us. Thank you!             Your Updated Medication List - Protect others around you: Learn how to safely use, store and throw away your medicines at www.disposemymeds.org.          This list is accurate as of: 5/9/17  2:17 PM.  Always use your most  recent med list.                   Brand Name Dispense Instructions for use    acetaminophen 325 MG tablet    TYLENOL    100 tablet    Take 1-2 tablets (325-650 mg) by mouth every 4 hours as needed for mild pain Do not take more than 10 tablets in 24 hours       albuterol 108 (90 BASE) MCG/ACT Inhaler    PROAIR HFA/PROVENTIL HFA/VENTOLIN HFA    1 Inhaler    Inhale 2 puffs into the lungs every 6 hours as needed for shortness of breath / dyspnea or wheezing       allopurinol 100 MG tablet    ZYLOPRIM    90 tablet    Take 0.5 tablets (50 mg) by mouth daily       aspirin 81 MG tablet      Take 1 tablet (81 mg) by mouth at bedtime       atorvastatin 40 MG tablet    LIPITOR    90 tablet    Take 1 tablet (40 mg) by mouth daily       darbepoetin emily 100 MCG/0.5ML injection    ARANESP (ALBUMIN FREE)    0.5 mL    Inject 0.5 mLs (100 mcg) Subcutaneous every 14 days As needed for hgb<10g/dL.  If Hgb increases >1 point in 2 weeks (if blood transfusion given, use hgb PRIOR to this), SYSTOLIC BP > 180 mmHg or hgb>=10g/dL, HOLD DOSE. Dose must be within 1 week of Hgb.  Per anemia protocol with Briec Caraballo MD       docusate sodium 100 MG capsule    COLACE    60 capsule    Take 1 capsule (100 mg) by mouth 2 times daily       furosemide 80 MG tablet    LASIX    120 tablet    Take 1 tablet (80 mg) by mouth 2 times daily In the morning and at 3 PM       glipiZIDE 5 MG tablet    GLUCOTROL    90 tablet    Take 1 tablet by mouth. TAKE 1 TABLET BY MOUTH DAILY BEFORE A MEAL       hydrALAZINE 50 MG tablet    APRESOLINE    270 tablet    Take 1 tablet (50 mg) by mouth 3 times daily       HYDROcodone-acetaminophen 5-325 MG per tablet    NORCO    15 tablet    Take 1 tablet by mouth every 4 hours as needed for pain       isosorbide mononitrate 60 MG 24 hr tablet    IMDUR    90 tablet    Take 1 tablet (60 mg) by mouth daily       loratadine 10 MG tablet    CLARITIN     Take 10 mg by mouth daily as needed Reported on 5/3/2017       losartan 100 MG  tablet    COZAAR    90 tablet    Take 1 tablet (100 mg) by mouth daily       metolazone 2.5 MG tablet    ZAROXOLYN    90 tablet    Take 1 tablet (2.5 mg) by mouth daily       metoprolol 100 MG 24 hr tablet    TOPROL XL    90 tablet    Take 1 tablet (100 mg) by mouth daily       omeprazole 20 MG CR capsule    priLOSEC     Take 20 mg by mouth daily       potassium chloride SA 20 MEQ CR tablet    K-DUR/KLOR-CON M    90 tablet    Take 1 tablet (20 mEq) by mouth daily       predniSONE 10 MG tablet    DELTASONE    15 tablet    Take 20 mg per day for 5 days and then 10mg per day for 5 days.       sodium bicarbonate 650 MG tablet     180 tablet    Take 2 tablets (1,300 mg) by mouth 3 times daily       VITAMIN D3 PO      Take 5,000 Units by mouth daily

## 2017-05-17 ENCOUNTER — TELEPHONE (OUTPATIENT)
Dept: NEPHROLOGY | Facility: CLINIC | Age: 62
End: 2017-05-17

## 2017-05-17 ENCOUNTER — OFFICE VISIT (OUTPATIENT)
Dept: NEPHROLOGY | Facility: CLINIC | Age: 62
End: 2017-05-17
Attending: INTERNAL MEDICINE
Payer: COMMERCIAL

## 2017-05-17 ENCOUNTER — OFFICE VISIT (OUTPATIENT)
Dept: RHEUMATOLOGY | Facility: CLINIC | Age: 62
End: 2017-05-17
Attending: NURSE PRACTITIONER
Payer: COMMERCIAL

## 2017-05-17 ENCOUNTER — TELEPHONE (OUTPATIENT)
Dept: PHARMACY | Facility: CLINIC | Age: 62
End: 2017-05-17

## 2017-05-17 ENCOUNTER — TELEPHONE (OUTPATIENT)
Dept: RHEUMATOLOGY | Facility: CLINIC | Age: 62
End: 2017-05-17

## 2017-05-17 VITALS
HEART RATE: 85 BPM | SYSTOLIC BLOOD PRESSURE: 110 MMHG | BODY MASS INDEX: 26.96 KG/M2 | RESPIRATION RATE: 18 BRPM | WEIGHT: 182 LBS | HEIGHT: 69 IN | DIASTOLIC BLOOD PRESSURE: 67 MMHG

## 2017-05-17 DIAGNOSIS — N18.5 CKD (CHRONIC KIDNEY DISEASE) STAGE 5, GFR LESS THAN 15 ML/MIN (H): ICD-10-CM

## 2017-05-17 DIAGNOSIS — N18.5 ANEMIA IN STAGE 5 CHRONIC KIDNEY DISEASE (H): Primary | ICD-10-CM

## 2017-05-17 DIAGNOSIS — M10.9 GOUT, UNSPECIFIED CAUSE, UNSPECIFIED CHRONICITY, UNSPECIFIED SITE: ICD-10-CM

## 2017-05-17 DIAGNOSIS — Z09 HOSPITAL DISCHARGE FOLLOW-UP: ICD-10-CM

## 2017-05-17 DIAGNOSIS — D63.1 ANEMIA IN STAGE 5 CHRONIC KIDNEY DISEASE (H): Primary | ICD-10-CM

## 2017-05-17 DIAGNOSIS — M25.552 HIP PAIN, LEFT: ICD-10-CM

## 2017-05-17 DIAGNOSIS — I50.40 COMBINED SYSTOLIC AND DIASTOLIC CONGESTIVE HEART FAILURE, UNSPECIFIED CONGESTIVE HEART FAILURE CHRONICITY: ICD-10-CM

## 2017-05-17 DIAGNOSIS — M10.9 ACUTE GOUTY ARTHRITIS: Primary | ICD-10-CM

## 2017-05-17 DIAGNOSIS — M16.0 OSTEOARTHRITIS OF BOTH HIPS, UNSPECIFIED OSTEOARTHRITIS TYPE: ICD-10-CM

## 2017-05-17 DIAGNOSIS — N18.5 ANEMIA IN STAGE 5 CHRONIC KIDNEY DISEASE (H): ICD-10-CM

## 2017-05-17 DIAGNOSIS — E87.70 HYPERVOLEMIA, UNSPECIFIED HYPERVOLEMIA TYPE: ICD-10-CM

## 2017-05-17 DIAGNOSIS — M25.551 HIP PAIN, BILATERAL: ICD-10-CM

## 2017-05-17 DIAGNOSIS — D63.1 ANEMIA IN STAGE 5 CHRONIC KIDNEY DISEASE (H): ICD-10-CM

## 2017-05-17 DIAGNOSIS — M25.552 HIP PAIN, BILATERAL: ICD-10-CM

## 2017-05-17 DIAGNOSIS — E83.51 HYPOCALCEMIA: ICD-10-CM

## 2017-05-17 LAB
ALBUMIN SERPL-MCNC: 2.6 G/DL (ref 3.4–5)
ALP SERPL-CCNC: 86 U/L (ref 40–150)
ALT SERPL W P-5'-P-CCNC: 12 U/L (ref 0–70)
ANION GAP SERPL CALCULATED.3IONS-SCNC: 14 MMOL/L (ref 3–14)
AST SERPL W P-5'-P-CCNC: 16 U/L (ref 0–45)
BASOPHILS # BLD AUTO: 0 10E9/L (ref 0–0.2)
BASOPHILS NFR BLD AUTO: 0.2 %
BILIRUB DIRECT SERPL-MCNC: 0.1 MG/DL (ref 0–0.2)
BILIRUB SERPL-MCNC: 0.6 MG/DL (ref 0.2–1.3)
BUN SERPL-MCNC: 163 MG/DL (ref 7–30)
CALCIUM SERPL-MCNC: 6.7 MG/DL (ref 8.5–10.1)
CHLORIDE SERPL-SCNC: 94 MMOL/L (ref 94–109)
CO2 SERPL-SCNC: 32 MMOL/L (ref 20–32)
CREAT SERPL-MCNC: 5.8 MG/DL (ref 0.66–1.25)
CREAT UR-MCNC: 136 MG/DL
CRP SERPL-MCNC: 39.3 MG/L (ref 0–8)
DIFFERENTIAL METHOD BLD: ABNORMAL
EOSINOPHIL # BLD AUTO: 0.1 10E9/L (ref 0–0.7)
EOSINOPHIL NFR BLD AUTO: 2.3 %
ERYTHROCYTE [DISTWIDTH] IN BLOOD BY AUTOMATED COUNT: 16.5 % (ref 10–15)
FERRITIN SERPL-MCNC: 806 NG/ML (ref 26–388)
GFR SERPL CREATININE-BSD FRML MDRD: 10 ML/MIN/1.7M2
GLUCOSE SERPL-MCNC: 132 MG/DL (ref 70–99)
HCT VFR BLD AUTO: 29 % (ref 40–53)
HGB BLD-MCNC: 9.3 G/DL (ref 13.3–17.7)
IMM GRANULOCYTES # BLD: 0 10E9/L (ref 0–0.4)
IMM GRANULOCYTES NFR BLD: 0.6 %
IRON SATN MFR SERPL: 20 % (ref 15–46)
IRON SERPL-MCNC: 42 UG/DL (ref 35–180)
LYMPHOCYTES # BLD AUTO: 0.6 10E9/L (ref 0.8–5.3)
LYMPHOCYTES NFR BLD AUTO: 12.4 %
MCH RBC QN AUTO: 29.1 PG (ref 26.5–33)
MCHC RBC AUTO-ENTMCNC: 32.1 G/DL (ref 31.5–36.5)
MCV RBC AUTO: 91 FL (ref 78–100)
MONOCYTES # BLD AUTO: 0.5 10E9/L (ref 0–1.3)
MONOCYTES NFR BLD AUTO: 10.7 %
NEUTROPHILS # BLD AUTO: 3.6 10E9/L (ref 1.6–8.3)
NEUTROPHILS NFR BLD AUTO: 73.8 %
NRBC # BLD AUTO: 0 10*3/UL
NRBC BLD AUTO-RTO: 0 /100
PHOSPHATE SERPL-MCNC: 5.1 MG/DL (ref 2.5–4.5)
PLATELET # BLD AUTO: 250 10E9/L (ref 150–450)
POTASSIUM SERPL-SCNC: 2.8 MMOL/L (ref 3.4–5.3)
PROT SERPL-MCNC: 6.5 G/DL (ref 6.8–8.8)
PROT UR-MCNC: 0.91 G/L
PROT/CREAT 24H UR: 0.67 G/G CR (ref 0–0.2)
RBC # BLD AUTO: 3.2 10E12/L (ref 4.4–5.9)
SODIUM SERPL-SCNC: 139 MMOL/L (ref 133–144)
TIBC SERPL-MCNC: 210 UG/DL (ref 240–430)
URATE SERPL-MCNC: 17.4 MG/DL (ref 3.5–7.2)
WBC # BLD AUTO: 4.9 10E9/L (ref 4–11)

## 2017-05-17 PROCEDURE — 83540 ASSAY OF IRON: CPT | Performed by: INTERNAL MEDICINE

## 2017-05-17 PROCEDURE — 84075 ASSAY ALKALINE PHOSPHATASE: CPT | Performed by: INTERNAL MEDICINE

## 2017-05-17 PROCEDURE — 85025 COMPLETE CBC W/AUTO DIFF WBC: CPT | Performed by: INTERNAL MEDICINE

## 2017-05-17 PROCEDURE — 99211 OFF/OP EST MAY X REQ PHY/QHP: CPT | Mod: 25,27,ZF

## 2017-05-17 PROCEDURE — 84460 ALANINE AMINO (ALT) (SGPT): CPT | Performed by: INTERNAL MEDICINE

## 2017-05-17 PROCEDURE — 83550 IRON BINDING TEST: CPT | Mod: AY | Performed by: INTERNAL MEDICINE

## 2017-05-17 PROCEDURE — 84450 TRANSFERASE (AST) (SGOT): CPT | Performed by: INTERNAL MEDICINE

## 2017-05-17 PROCEDURE — 80069 RENAL FUNCTION PANEL: CPT | Mod: AY | Performed by: INTERNAL MEDICINE

## 2017-05-17 PROCEDURE — 25000128 H RX IP 250 OP 636: Mod: ZF | Performed by: INTERNAL MEDICINE

## 2017-05-17 PROCEDURE — 84550 ASSAY OF BLOOD/URIC ACID: CPT | Performed by: INTERNAL MEDICINE

## 2017-05-17 PROCEDURE — 84156 ASSAY OF PROTEIN URINE: CPT

## 2017-05-17 PROCEDURE — 84155 ASSAY OF PROTEIN SERUM: CPT | Mod: AY | Performed by: INTERNAL MEDICINE

## 2017-05-17 PROCEDURE — 82248 BILIRUBIN DIRECT: CPT | Performed by: INTERNAL MEDICINE

## 2017-05-17 PROCEDURE — 99213 OFFICE O/P EST LOW 20 MIN: CPT | Mod: 25,ZF

## 2017-05-17 PROCEDURE — 82728 ASSAY OF FERRITIN: CPT | Mod: AY | Performed by: INTERNAL MEDICINE

## 2017-05-17 PROCEDURE — 82247 BILIRUBIN TOTAL: CPT | Performed by: INTERNAL MEDICINE

## 2017-05-17 PROCEDURE — 86140 C-REACTIVE PROTEIN: CPT | Performed by: INTERNAL MEDICINE

## 2017-05-17 PROCEDURE — 36415 COLL VENOUS BLD VENIPUNCTURE: CPT | Performed by: INTERNAL MEDICINE

## 2017-05-17 RX ORDER — CALCITRIOL 0.25 UG/1
0.25 CAPSULE, LIQUID FILLED ORAL DAILY
Qty: 90 CAPSULE | Refills: 0 | Status: SHIPPED | OUTPATIENT
Start: 2017-05-17 | End: 2018-01-29

## 2017-05-17 RX ORDER — POTASSIUM CHLORIDE 1500 MG/1
40 TABLET, EXTENDED RELEASE ORAL DAILY
Qty: 90 TABLET | Refills: 0 | Status: ON HOLD | OUTPATIENT
Start: 2017-05-17 | End: 2017-06-30

## 2017-05-17 RX ORDER — PREDNISONE 5 MG/1
5 TABLET ORAL 2 TIMES DAILY
Qty: 60 TABLET | Refills: 1 | Status: SHIPPED | OUTPATIENT
Start: 2017-05-17 | End: 2017-06-02

## 2017-05-17 RX ORDER — ALLOPURINOL 100 MG/1
100 TABLET ORAL DAILY
Qty: 90 TABLET | Refills: 1 | Status: SHIPPED | OUTPATIENT
Start: 2017-05-17 | End: 2017-06-02

## 2017-05-17 RX ADMIN — DARBEPOETIN ALFA 100 MCG: 100 INJECTION, SOLUTION INTRAVENOUS; SUBCUTANEOUS at 13:16

## 2017-05-17 ASSESSMENT — PAIN SCALES - GENERAL
PAINLEVEL: MILD PAIN (3)
PAINLEVEL: MILD PAIN (2)

## 2017-05-17 NOTE — TELEPHONE ENCOUNTER
Per Dr. Caraballo:  Please have Mr Murray Nicholson increase his allopurinol from 50mg daily to 100mg daily. (his uric acid was still elevated)     Updated patient. New rx sent.    Carla Kyle RN

## 2017-05-17 NOTE — NURSING NOTE
"Chief Complaint   Patient presents with     RECHECK     Kidney follow up       Initial /67 (BP Location: Right arm, Patient Position: Chair, Cuff Size: Adult Regular)  Pulse 85  Resp 18  Ht 1.753 m (5' 9\")  Wt 82.6 kg (182 lb)  BMI 26.88 kg/m2 Estimated body mass index is 26.88 kg/(m^2) as calculated from the following:    Height as of this encounter: 1.753 m (5' 9\").    Weight as of this encounter: 82.6 kg (182 lb).  Medication Reconciliation: complete   RUPAL GE CMA      "

## 2017-05-17 NOTE — LETTER
5/17/2017      RE: Murray Nicholson  [address]  [place] [address]       Nephrology follow up    61yo aaM with diabetic/hypertensive nephropathy with albuminuria since 2004 here for follow up. Admitted in April with volume overload, CHF, and hip pain (possible gouty arthritis). His EF was noted to be 20-25% with moderate diastolic dysfunction. He has since had fatigue and occasional anorexia as well as a metallic taste in his mouth. He denies nausea, vomiting, dysuria. 4pt ROS positive for persistent joint pain.    PMHx  DM - since ~2000, no retinopathy/neuropathy  HTN - since ~2000  Dyslipidemia  GERD  CHF - admit 2008, diastolic dysfunction; EF 20-25% April 2017  CKD - since ~2000    Medications reviewed, of note  Hydralazine 50mg TID  Metoprolol 100mg daily  Losartan 100mg daily  Isosorbide mononitrate 60mg daily  Amlodipine 5mg BID  Furosemide 80mg BID  Metolazone 2.5mg daily  Allopurinol 50mg daily  Atorvastatin 40mg daily  ASA 81mg daily  Omeprazole 40mg daily  darbe  K Cl 20mEq daily  NaHCO3 1300mg BID  Cholecalciferol 5000 daily  prednisone  No NSAIDs    Exam   115/69   113/68   110/67   P85  Gen - nad  CV - rrr, no rub, +KELLY  Resp - cta bilat  Ext - trace edema  Psych - pleasant, appropriate, talkative  Neuro - no asterixis    Labs     2014 2015    2016  2017   12/'02 12/'04 11/'06 8/'13 10/22 4/9 5/7 5/18 6/16 2/10 10/26 1/4 3/15 5/17  Cr 1.5  2.2 2.1 2.7 4.3 4.7 6.3 4.3  3.8  4.5  5.1  4.9  5.8   Uprot/Cr   1.3  Ualb/Cr 534    2270    7/3/13 UA - 100 alb, neg blood  5/5/15 UA - 100 alb, neg blood    Assessment/Recommendations: 61yo aaM with stage IV/V CKD with proteinuria.   CKD - (severe) stage IV/V likely due to DM and hypertension. No acute indication for dialysis at this time although he does have symptoms suggestive or uremia. Needs further evaluation from cardiology in order to be cleared for placement of PD catheter.   - referral to cardiology for pre-op evaluation for PD catheter  placement   - chosen PD for RRT   - avoid NSAIDs    Volume/BP - BP well controlled with improvement in edema. Target BP <140/90.    - continue current antihypertensives for now    Anemia - Referred to anemia program.     Acid-base - bicarb 32, discontinued NaHCO3    Ca/phos - hypocalcemia likely due to Vit D deficiency with insufficient conversion to active Vit D   - discontinued cholecalciferol   - started calcitriol 0.25mcg daily    Electrolytes - hypokalemia from diuretics.   - increased K to 40mEq daily    Hyperuricemia - increased allopurinol from 50 to 100mg daily    Current RRT choice: PD  RTC in 6 weeks for BP check, RP, CBC, uric acid, evaluate for Si/Sx uremia      Brice Caraballo MD

## 2017-05-17 NOTE — PATIENT INSTRUCTIONS
Discontinue NaHCO3 and cholecalciferol  Increase potassium to 40mEq daily  Start calcitriol 0.25mcg daily

## 2017-05-17 NOTE — PROGRESS NOTES
Nephrology follow up    63yo aaM with diabetic/hypertensive nephropathy with albuminuria since 2004 here for follow up. Admitted in April with volume overload, CHF, and hip pain (possible gouty arthritis). His EF was noted to be 20-25% with moderate diastolic dysfunction. He has since had fatigue and occasional anorexia as well as a metallic taste in his mouth. He denies nausea, vomiting, dysuria. 4pt ROS positive for persistent joint pain.    PMHx  DM - since ~2000, no retinopathy/neuropathy  HTN - since ~2000  Dyslipidemia  GERD  CHF - admit 2008, diastolic dysfunction; EF 20-25% April 2017  CKD - since ~2000    Medications reviewed, of note  Hydralazine 50mg TID  Metoprolol 100mg daily  Losartan 100mg daily  Isosorbide mononitrate 60mg daily  Amlodipine 5mg BID  Furosemide 80mg BID  Metolazone 2.5mg daily  Allopurinol 50mg daily  Atorvastatin 40mg daily  ASA 81mg daily  Omeprazole 40mg daily  darbe  K Cl 20mEq daily  NaHCO3 1300mg BID  Cholecalciferol 5000 daily  prednisone  No NSAIDs    Exam   115/69   113/68   110/67   P85  Gen - nad  CV - rrr, no rub, +KELLY  Resp - cta bilat  Ext - trace edema  Psych - pleasant, appropriate, talkative  Neuro - no asterixis    Labs     2014 2015    2016  2017   12/'02 12/'04 11/'06 8/'13 10/22 4/9 5/7 5/18 6/16 2/10 10/26 1/4 3/15 5/17  Cr 1.5  2.2 2.1 2.7 4.3 4.7 6.3 4.3  3.8  4.5  5.1  4.9  5.8   Uprot/Cr   1.3  Ualb/Cr 534    2270    7/3/13 UA - 100 alb, neg blood  5/5/15 UA - 100 alb, neg blood    Assessment/Recommendations: 63yo aaM with stage IV/V CKD with proteinuria.   CKD - (severe) stage IV/V likely due to DM and hypertension. No acute indication for dialysis at this time although he does have symptoms suggestive or uremia. Needs further evaluation from cardiology in order to be cleared for placement of PD catheter.   - referral to cardiology for pre-op evaluation for PD catheter placement   - chosen PD for RRT   - avoid NSAIDs    Volume/BP - BP well controlled with  improvement in edema. Target BP <140/90.    - continue current antihypertensives for now    Anemia - Referred to anemia program.     Acid-base - bicarb 32, discontinued NaHCO3    Ca/phos - hypocalcemia likely due to Vit D deficiency with insufficient conversion to active Vit D   - discontinued cholecalciferol   - started calcitriol 0.25mcg daily    Electrolytes - hypokalemia from diuretics.   - increased K to 40mEq daily    Hyperuricemia - increased allopurinol from 50 to 100mg daily    Current RRT choice: PD  RTC in 6 weeks for BP check, RP, CBC, uric acid, evaluate for Si/Sx uremia

## 2017-05-17 NOTE — TELEPHONE ENCOUNTER
Anemia Management Note  SUBJECTIVE/OBJECTIVE:  Referred by Dr. Brice Caraballo on 2015.  Primary Diagnosis: Anemia in Chronic Kidney Disease (N18.5 D63.1)   Secondary Diagnosis: Chronic Kidney Disease, Stage V (N18.5)  Hgb goal range: 9-10  Epo/Darbo: Aranesp 100 mcg every 2 weeks as needed - in clinic  RX order expires on 18  Iron regimen: Ferrous Sulfate TID - hold 17  Lab orders  on 2017 In Epic    Anemia Latest Ref Rng & Units 2017 2017 2017 2017 2017 5/3/2017 2017   ANASTASIYA Dose - - 100 mcg - - - 100 mcg 100 mcg   Hemoglobin 13.3 - 17.7 g/dL 8.4(L) - 9.1(L) 9.1(L) 9.2(L) 9.7(L) 9.3(L)   IV Iron Dose - - - - - - - -   TSAT 15 - 46 % - - 30 - - - 20   Ferritin 26 - 388 ng/mL - - 1509(H) - - - 806(H)     BP Readings from Last 3 Encounters:   17 110/67   17 128/71   17 126/70     Wt Readings from Last 2 Encounters:   17 182 lb (82.6 kg)   17 182 lb 9.6 oz (82.8 kg)     Next lab/aranesp appt is scheduled for 17 at 10:45    ASSESSMENT:  Hgb: at goal - received dose in clinic - recommend continue current regimen  TSat: not at goal (>30%) but ferritin >500ng/mL.  IV iron not indicated at this time per anemia protocol. Ferritin: At goal (>100ng/mL)    PLAN:  RTC for hgb then aranesp if needed in 2 week(s)    Orders needed to be renewed (for next follow-up date) in [facility]: None    Iron labs due:  17    Plan discussed with:  No call made  Plan provided by:  Lakisha    NEXT FOLLOW-UP DATE:  17    Anemia Management Service  Zelda Rich,EllisD and Rosa Marcial CPhT  Phone: [phone]  Fax: [phone]

## 2017-05-17 NOTE — MR AVS SNAPSHOT
After Visit Summary   5/17/2017    Murray Nicholson    MRN: [id number]           Patient Information     Date Of Birth          1955        Visit Information        Provider Department      5/17/2017 12:30 PM Brice Caraballo MD [facility] Nephrology        Today's Diagnoses     Anemia in stage 5 chronic kidney disease (H)    -  1    CKD (chronic kidney disease) stage 5, GFR less than 15 ml/min (H)        Combined systolic and diastolic congestive heart failure, unspecified congestive heart failure chronicity (H)        Hypervolemia, unspecified hypervolemia type        Hospital discharge follow-up        Hypocalcemia          Care Instructions    Discontinue NaHCO3 and cholecalciferol  Increase potassium to 40mEq daily  Start calcitriol 0.25mcg daily          Follow-ups after your visit        Follow-up notes from your care team     Return in about 6 weeks (around 6/28/2017).      Your next 10 appointments already scheduled     May 31, 2017 10:45 AM CDT   Lab with  LAB   [facility] Lab ([facility])    [address] [place] [id number]   [phone]            May 31, 2017 11:30 AM CDT   Nurse Visit with [facility] Nurse   [facility] Solid Organ Transplant ([facility])    [address] [place] [id number]   [phone]            Jun 21, 2017 10:30 AM CDT   Lab with  LAB   [facility] ([facility])    [address] [place] [id number]   [phone]            Jun 21, 2017 11:30 AM CDT   (Arrive by 11:00 AM)   Return Visit with Brice Caraballo MD   [facility] Nephrology ([facility])    [address] [place] [address]   [phone]            Aug 02, 2017  1:00 PM CDT   Lab with  LAB   [facility] ([facility])    [address] [place] [id number]    [phone]            Aug 02, 2017  2:30 PM CDT   (Arrive by 2:00 PM)   Return Visit with Brice Caraballo MD   [facility]rology ([facility])    [address] [address] [phone]              Future tests that were ordered for you today     Open Standing Orders        Priority Remaining Interval Expires Ordered    CBC with platelets differential Routine 4/4 every 2 weeks 7/17/2017 5/17/2017    Comprehensive metabolic panel Routine 4/4 every 2 weeks 7/17/2017 5/17/2017    Uric acid Routine 4/4 every 2 weeks 7/17/2017 5/17/2017    CRP inflammation Routine 4/4 every 2 weeks 7/17/2017 5/17/2017            Who to contact     If you have questions or need follow up information about today's clinic visit or your schedule please contact [facility] NEPHROLOG[facility] directly at [phone].  Normal or non-critical lab and imaging results will be communicated to you by [employer], letter or phone within 4 business days after the clinic has received the results. If you do not hear from us within 7 days, please contact the clinic through [employer] or phone. If you have a critical or abnormal lab result, we will notify you by phone as soon as possible.  Submit refill requests through [employer] or call your pharmacy and they will forward the refill request to us. Please allow 3 business days for your refill to be completed.          Additional Information About Your Visit        [employer] Information     [employer] gives you secure access to your electronic health record. If you see a primary care provider, you can also send messages to your care team and make appointments. If you have questions, please call your primary care clinic.  If you do not have a primary care provider, please call [phone] and they will assist you.        Care EveryWhere ID     This is your Care EveryWhere ID. This could be used by other organizations to access your [facility]  "records  [id number]        Your Vitals Were     Pulse Respirations Height BMI (Body Mass Index)          85 18 1.753 m (5' 9\") 26.88 kg/m2         Blood Pressure from Last 3 Encounters:   05/17/17 110/67   05/09/17 128/71   05/03/17 126/70    Weight from Last 3 Encounters:   05/17/17 82.6 kg (182 lb)   05/03/17 82.8 kg (182 lb 9.6 oz)   04/28/17 83.6 kg (184 lb 6.4 oz)              We Performed the Following     Hematocrit     Hemoglobin     Treatment Conditions     Vital signs          Today's Medication Changes          These changes are accurate as of: 5/17/17  4:25 PM.  If you have any questions, ask your nurse or doctor.               Start taking these medicines.        Dose/Directions    calcitRIOL 0.25 MCG capsule   Commonly known as:  ROCALTROL   Used for:  Hypocalcemia   Started by:  Brice Caraballo MD        Dose:  0.25 mcg   Take 1 capsule (0.25 mcg) by mouth daily   Quantity:  90 capsule   Refills:  0         These medicines have changed or have updated prescriptions.        Dose/Directions    allopurinol 100 MG tablet   Commonly known as:  ZYLOPRIM   This may have changed:  how much to take   Used for:  Gout, unspecified cause, unspecified chronicity, unspecified site   Changed by:  Carla Kyle RN        Dose:  100 mg   Take 1 tablet (100 mg) by mouth daily   Quantity:  90 tablet   Refills:  1       potassium chloride SA 20 MEQ CR tablet   Commonly known as:  K-DUR/KLOR-CON M   This may have changed:  how much to take   Used for:  Combined systolic and diastolic congestive heart failure, unspecified congestive heart failure chronicity (H), Hypervolemia, unspecified hypervolemia type, Hospital discharge follow-up   Changed by:  Brice Caraballo MD        Dose:  40 mEq   Take 2 tablets (40 mEq) by mouth daily   Quantity:  90 tablet   Refills:  0       * predniSONE 10 MG tablet   Commonly known as:  DELTASONE   This may have changed:  Another medication with the same name was added. Make " sure you understand how and when to take each.   Used for:  Multiple joint pain   Changed by:  Leia De Leon APRN CNP        Take 20 mg per day for 5 days and then 10mg per day for 5 days.   Quantity:  15 tablet   Refills:  0       * predniSONE 5 MG tablet   Commonly known as:  DELTASONE   This may have changed:  You were already taking a medication with the same name, and this prescription was added. Make sure you understand how and when to take each.   Used for:  Acute gouty arthritis   Changed by:  Des Burgess APRN CNP        Dose:  5 mg   Take 1 tablet (5 mg) by mouth 2 times daily   Quantity:  60 tablet   Refills:  1       * Notice:  This list has 2 medication(s) that are the same as other medications prescribed for you. Read the directions carefully, and ask your doctor or other care provider to review them with you.      Stop taking these medicines if you haven't already. Please contact your care team if you have questions.     VITAMIN D3 PO   Stopped by:  Brice Caraballo MD                Where to get your medicines      These medications were sent to [id number] - [place], [place] - [address], [place] [address]     Phone:  [phone]     allopurinol 100 MG tablet    calcitRIOL 0.25 MCG capsule    potassium chloride SA 20 MEQ CR tablet    predniSONE 5 MG tablet                Primary Care Provider Office Phone # Fax #    Yahir Turcios [phone]       [place] [address]  [facility] [address]        Thank you!     Thank you for choosing [facility] NEPHROLOG[facility]  for your care. Our goal is always to provide you with excellent care. Hearing back from our patients is one way we can continue to improve our services. Please take a few minutes to complete the written survey that you may receive in the mail after your visit with us. Thank you!             Your Updated Medication List -  Protect others around you: Learn how to safely use, store and throw away your medicines at www.disposemymeds.org.          This list is accurate as of: 5/17/17  4:25 PM.  Always use your most recent med list.                   Brand Name Dispense Instructions for use    acetaminophen 325 MG tablet    TYLENOL    100 tablet    Take 1-2 tablets (325-650 mg) by mouth every 4 hours as needed for mild pain Do not take more than 10 tablets in 24 hours       albuterol 108 (90 BASE) MCG/ACT Inhaler    PROAIR HFA/PROVENTIL HFA/VENTOLIN HFA    1 Inhaler    Inhale 2 puffs into the lungs every 6 hours as needed for shortness of breath / dyspnea or wheezing       allopurinol 100 MG tablet    ZYLOPRIM    90 tablet    Take 1 tablet (100 mg) by mouth daily       aspirin 81 MG tablet      Take 1 tablet (81 mg) by mouth at bedtime       atorvastatin 40 MG tablet    LIPITOR    90 tablet    Take 1 tablet (40 mg) by mouth daily       calcitRIOL 0.25 MCG capsule    ROCALTROL    90 capsule    Take 1 capsule (0.25 mcg) by mouth daily       darbepoetin emily 100 MCG/0.5ML injection    ARANESP (ALBUMIN FREE)    0.5 mL    Inject 0.5 mLs (100 mcg) Subcutaneous every 14 days As needed for hgb<10g/dL.  If Hgb increases >1 point in 2 weeks (if blood transfusion given, use hgb PRIOR to this), SYSTOLIC BP > 180 mmHg or hgb>=10g/dL, HOLD DOSE. Dose must be within 1 week of Hgb.  Per anemia protocol with Brice Caraballo MD       docusate sodium 100 MG capsule    COLACE    60 capsule    Take 1 capsule (100 mg) by mouth 2 times daily       furosemide 80 MG tablet    LASIX    120 tablet    Take 1 tablet (80 mg) by mouth 2 times daily In the morning and at 3 PM       glipiZIDE 5 MG tablet    GLUCOTROL    90 tablet    Take 1 tablet by mouth. TAKE 1 TABLET BY MOUTH DAILY BEFORE A MEAL       hydrALAZINE 50 MG tablet    APRESOLINE    270 tablet    Take 1 tablet (50 mg) by mouth 3 times daily       HYDROcodone-acetaminophen 5-325 MG per tablet    NORCO    15 tablet     Take 1 tablet by mouth every 4 hours as needed for pain       isosorbide mononitrate 60 MG 24 hr tablet    IMDUR    90 tablet    Take 1 tablet (60 mg) by mouth daily       loratadine 10 MG tablet    CLARITIN     Take 10 mg by mouth daily as needed Reported on 5/3/2017       losartan 100 MG tablet    COZAAR    90 tablet    Take 1 tablet (100 mg) by mouth daily       metolazone 2.5 MG tablet    ZAROXOLYN    90 tablet    Take 1 tablet (2.5 mg) by mouth daily       metoprolol 100 MG 24 hr tablet    TOPROL XL    90 tablet    Take 1 tablet (100 mg) by mouth daily       omeprazole 20 MG CR capsule    priLOSEC     Take 20 mg by mouth daily       potassium chloride SA 20 MEQ CR tablet    K-DUR/KLOR-CON M    90 tablet    Take 2 tablets (40 mEq) by mouth daily       * predniSONE 10 MG tablet    DELTASONE    15 tablet    Take 20 mg per day for 5 days and then 10mg per day for 5 days.       * predniSONE 5 MG tablet    DELTASONE    60 tablet    Take 1 tablet (5 mg) by mouth 2 times daily       * Notice:  This list has 2 medication(s) that are the same as other medications prescribed for you. Read the directions carefully, and ask your doctor or other care provider to review them with you.

## 2017-05-17 NOTE — LETTER
5/17/2017       RE: Murray Nicholson  [address]  [place] [address]     Dear Colleague,    Thank you for referring your patient, Murray Nicholson, to the [facility] RHEUMATOLOGY at [facility]. Please see a copy of my visit note below.    Rheumatology Visit     Murray Nicholson MRN# [id number]   YOB: 1955 Age: 62 year old     Date of visit: May 17, 2017  Primary care provider: Yahir Turcios          Assessment/Plan:     1. Acute gouty arthritis. Discussed/reviewed case with Dr. Gaston. MRI Rt hip showed cystic changes which can be degenerative arthritis or due to gout. +hx of LBP with MRI showing some bulging disc. Prednisone responsive. The left hip symptoms he reports are not as his prior gout flares (in toes) and seems to be associated with movement but does have prednisone response. His UA 4-28 was high at 17.9 but no recent CRP. Given all this, this left hip pain may be gout or OA  Or both and the back may be contributing. We will proceed with checking liver enzymes, uric acid, CRP and then increasing the alloupurinol to 100 mg day with labs [CMP, liver enzymes, UA, CRP and CBCD Q 2 weeks], do dual imaging CT scan of the hips with the gout protocol to evaluate for cystals and put him on prednisone 5 mg BID for prophylaxis while adjusting allopurinol. Labs in 2 weeks after the change then consider dose adjustment 200 mg day. In additin, we will refer him to PT. RTC with MD Dr. Gaston or MD as he's complex and will need to establish care with MD. We informed him this is not RA. He agrees with this plan and has no questions at this time.   Multiple risk factors (CKD, aggressive diuresis, severe hyperuricemia), and clinical symptoms of recurrent gout flares (R great toe acute arthritis in January which resolved with prednisone, recurrence of R great toe acute arthritis when started on allopurinol, R hip acute arthritis 3 weeks ago, resolving gradually and  "worsening 4-8 inpt). Acute gout treatment and urate-lowering therapy as detailed above. Plan to treat to target of serum uric acid <6 mg/dl.     The patient understood the rational for the diagnosis and treatment plan. Patient shared in the decision making. All questions were answered to best of my ability and the patient's satisfaction  Des MARTIN, CNP, MSN  [facility]  Department of Rheumatology & Autoimmune Disorders          History of Present Illness:   Murray Nicholson is a 62 year old male who presents as a f/u for acute gouty arthritis (UA 15.1 on 4-) due to renal impairment --consulted inpt with Dr. Jennifer Gaston 4-8     Review: CKD stage 4/5, CHF (systolic/diastolic), DM, hyperlipidemia, CAD, chronic anemia, HTN     Taking allopurinol 50 mg PO day (1/2 tab 100 mg) and on prednisone 40 mg day x 4 days, 20 mg day x 5 day then 10 mg day #50 on 4-14.     Recent inpt 4-8 to 4-13 \"ran out medications [Fluid overload on admission was most likely secondary to patient not taking lasix for last 2 weeks, cardiac dysfunction, and renal failure; TTE performed 4/10 showed significantly reduced EF (20%) compared to MARIANA performed 11/16 that showed EF of 40% felt per cardiology due to drug holiday--d/c with lasix 80 mg BID and metolazone 2.5mg day (new) with f/u MARIANA 3 mth; acute on chronic kidney injury--Transplant surgery was consulted for possible peritoneal dialysis catheter placement, but procedure was postponed until patient's cardiac function is optimized; LBP into groin and leg MRI of lumbar spine and right hip was performed 4/11. MRI showed edema of the soft tissues and muscles of the right hip. With patient's rapidly progressing symptoms and elevated CRP (120), differential included infection, inflammation and malignancy. Myositis is less likely with normal CK. Rheumatology was consulted and diagnosed patient with gouty arthritis of right hip. He was started on prednisone and " "allopurinol--much improved ROM; DM, chronic anemia, AAA, bicuspid AV]     4-8 while inpt Rheumatology consult with Dr. Jennifer Gaston. \"His features of right hip pain , decrease ROM along with presence of soft tissue edema in MRI elicit towards inflammatory arthritis. This is monoarticular given his history and more subacute in onset. Other differentials could be pseudogout but given history of CKD, hyperuricemia would favor diagnoses of acute gouty arthritis. Also CRP elevation would be explained by this. Arthrocentesis not performed as he does not really have effusion and also hip joint is not as accessible as other joint but would certainly want to do arthrocentesis in the future for confirming diagnoses and this was explained to the patient.    Recommendations:  -prednisone 40 mg for 5 days, 20 for 5 days and then continue 10 mg Daily until rheumatology clinic visit  - start allopurinol at 50 mg daily along with prednisone ; make sure he has baseline labs including LFTs and CBC. Please order outpatient repeat LFTs and CBC in 2 weeks, prior to rheumatology clinic visit.   - Additionally, if ok with cardiology, suggest switching ACE inhibitor to losartan, which is uricosuric.     MRI right hip 4-2017   IMPRESSION:  1. Limited evaluation of the right hip due to patient's inability to tolerate the scan.  2. Subchondral cystic changes in the right hip acetabulum, with likely associated degenerative change, however this is not is not well  evaluated on the study that was obtained.  3. No marrow signal abnormalities in the right hemipelvis to suggest fracture, osteonecrosis, or marrow infiltration.  4. Diffuse soft tissue edema without atrophy or fatty infiltration within the muscles of the right hemipelvis, including the right  adductor muscles, right gluteus minimus muscle, as well as the right vastus lateralis and vastus intermedius muscles, findings are  nonspecific, however possibilities include myositis, " "inflammatory or infectious. Correlate clinically     May 17, 2017  Seen 4-8 Dr. Gaston who dx with acute gout--was having worsening right hip pain x 3 day, radiation to b/l thighs does have a hx of gouty arthritis with this previous dx made clinically and aspiration results--prednisone response in the past was dramatic (last time was 2017) hyperuricemia 15.1 with  during admission was attempted to start allopurinol but immediately flared up. Hx previous flares right great toe.   -19 Seen nephrology Patricia MARTIN [CKD5 stable creat 6, diabetic nephropathy; severe CM not appropriate for surgery. Started on Prednisone 40 mg qd x 5 dys. Beginning taper today with 20 mg qd x 5 dy, 10 mg qd x 5 dys, then d/c. He continues on allopurinol 50 mg qd. Feeling improved. Plan UA check next visit with neph in 2 wks]   - Uric acid 17.9  5-3 seen neph on prednisone 10 mg day and was told to taper to 5 mg day x 3 day  5-9 seen PCP to get prednisone to get to mom's  in [facility]. Tapered off prednisone then developed left hip pain. Was on prednisone 40 mg x 1 week, 20 mg x 1 week then 10 mg x 1 wk so was off then instructed to restart prednisone 20mg day x 5 day then 10 mg day till sees rheum. Used a wheelchair for this memorial, came back on Friday.   Today seen Dr. Caraballo (neph) --on allopurinol 50 mg once a day. Needs peritoneal dialysis and will be seeing cardiology (heart and fluid f/u) make sure this is stable to put in the peritoneal dialysis. No fluid retention and \"doing good\" watches his ankles.   Mom passed away 2 weeks ago so had to fly out to [facility], hips were bad migratory right then left hip more deep in his left groin, and finds himself pushing on his groin so less pain and will get this sharp pain and the leg will may give out like is kim so hard to move it but now is fine. This is the same as in the hospital but now on the left hip area. This Monday felt this symptoms lasted about 30 sec so " "massages this area. Left hip started about 1 week ago, before was in the right hip so has shifted now. Now on prednisone at 10 mg day and did not take a dose today. This last time unsure if the prednisone helped or not. No N/T down the leg, but maybe left foot comes and goes more at night. Left hip hard to like get out of the car but this shifts in the hips. Mornings are fine now hands, wrists, or joints of hands, ankles, toes or other joints.   Told inpt was gout and maybe early RA. Gout for about 1 yr [flare were in his big toes right and toe then the whole foot hurts so hard to move the foot or walk]. Last time flared in the feet was about 6 weeks ago around time inpt. Today more this foot is more his gouty type.  Now taking allopurinol for 1/2 tab a day. He's thinking his left hip is arthritis. Mild in the toes pain no swelling. Does not feel this is gout.   Tolerating the medications prednisone and allopurinol. Prednisone is \"like a miracle\" drug   SHEELA not on CPAP \"ok\"  Some LBP he was dealing with prior to the inpt seen chiropractor now feels some left       Past Medical/Surgical History:   Injuries-  No Blood transfusions  Medical-  Past Medical History:   Diagnosis Date     (HFpEF) heart failure with preserved ejection fraction (H)      Allergic rhinitis, cause unspecified      Anemia of chronic kidney failure      Ascending aortic aneurysm (H)      Bicuspid aortic valve      CAD (coronary artery disease)      Chronic kidney disease, stage 5 (H)      Congestive heart failure, unspecified      Dyslipidemia      Esophageal reflux      Hypersomnia with sleep apnea, unspecified      Hypertension      MGUS (monoclonal gammopathy of unknown significance)      SHEELA (obstructive sleep apnea)      Type 2 diabetes mellitus (H)        Surgical-  Past Surgical History:   Procedure Laterality Date     ABDOMEN SURGERY      Hernia     LAPAROSCOPIC HERNIORRHAPHY INGUINAL BILATERAL Bilateral 7/24/2015    Procedure: " LAPAROSCOPIC HERNIORRHAPHY INGUINAL BILATERAL;  Surgeon: Bobby Mcconnell MD;  Location: [facility] OR     NO HISTORY OF SURGERY               Family/Social History:   Family-  No autoimmune disorders, psoriasis, UC, crohn's, SLE, RA, PsA.   No MS  Never met dad  Mother-passed at 89 y/o recently mild stroke, sudden decline, dementia   Father-CAD, DM  Brother-healthy some kidney stones (had different father)   2 sons-healthy     Social-  Quit Alcohol. Quit years ago Smoking. 2 Children. NO IVDU or drug use. Works in retail              Allergies/Medications:     Allergies   Allergen Reactions     Cats      Throat tightness     Penicillins Hives     Seasonal Allergies      rhinitis     Shrimp      Throat closes        Outpatient Prescriptions Marked as Taking for the 5/17/17 encounter (Office Visit) with Des Burgess APRN CNP   Medication Sig     predniSONE (DELTASONE) 10 MG tablet Take 20 mg per day for 5 days and then 10mg per day for 5 days.     HYDROcodone-acetaminophen (NORCO) 5-325 MG per tablet Take 1 tablet by mouth every 4 hours as needed for pain     metolazone (ZAROXOLYN) 2.5 MG tablet Take 1 tablet (2.5 mg) by mouth daily     glipiZIDE (GLUCOTROL) 5 MG tablet Take 1 tablet by mouth. TAKE 1 TABLET BY MOUTH DAILY BEFORE A MEAL     allopurinol (ZYLOPRIM) 100 MG tablet Take 0.5 tablets (50 mg) by mouth daily     atorvastatin (LIPITOR) 40 MG tablet Take 1 tablet (40 mg) by mouth daily     isosorbide mononitrate (IMDUR) 60 MG 24 hr tablet Take 1 tablet (60 mg) by mouth daily     metoprolol (TOPROL XL) 100 MG 24 hr tablet Take 1 tablet (100 mg) by mouth daily     losartan (COZAAR) 100 MG tablet Take 1 tablet (100 mg) by mouth daily     hydrALAZINE (APRESOLINE) 50 MG tablet Take 1 tablet (50 mg) by mouth 3 times daily     [DISCONTINUED] potassium chloride SA (K-DUR/KLOR-CON M) 20 MEQ CR tablet Take 1 tablet (20 mEq) by mouth daily     [DISCONTINUED] sodium bicarbonate 650 MG tablet Take 2 tablets (1,300 mg) by  mouth 3 times daily     acetaminophen (TYLENOL) 325 MG tablet Take 1-2 tablets (325-650 mg) by mouth every 4 hours as needed for mild pain Do not take more than 10 tablets in 24 hours     furosemide (LASIX) 80 MG tablet Take 1 tablet (80 mg) by mouth 2 times daily In the morning and at 3 PM     docusate sodium (COLACE) 100 MG capsule Take 1 capsule (100 mg) by mouth 2 times daily     omeprazole (PRILOSEC) 20 MG CR capsule Take 20 mg by mouth daily     darbepoetin emily (ARANESP, ALBUMIN FREE,) 100 MCG/0.5ML injection Inject 0.5 mLs (100 mcg) Subcutaneous every 14 days As needed for hgb<10g/dL.  If Hgb increases >1 point in 2 weeks (if blood transfusion given, use hgb PRIOR to this), SYSTOLIC BP > 180 mmHg or hgb>=10g/dL, HOLD DOSE. Dose must be within 1 week of Hgb.  Per anemia protocol with Brice Caraballo MD     albuterol (PROAIR HFA/PROVENTIL HFA/VENTOLIN HFA) 108 (90 BASE) MCG/ACT Inhaler Inhale 2 puffs into the lungs every 6 hours as needed for shortness of breath / dyspnea or wheezing     [DISCONTINUED] Cholecalciferol (VITAMIN D3 PO) Take 5,000 Units by mouth daily     loratadine (CLARITIN) 10 MG tablet Take 10 mg by mouth daily as needed Reported on 5/3/2017     ASPIRIN 81 MG OR TABS Take 1 tablet (81 mg) by mouth at bedtime            Review of Systems:   Negative raynaud s phenomena, hairloss, sun sensitivities, keratoconjunctivitis sicca, pleurisy, inflammatory joint symptoms, significant rashes like malar, oral/nasal or ulcerations, inflammatory eye disease, inflammatory bowel disease, dactylitis, tenosynovitis, or enthespathy, blood clots,  psoriasis, UC, crohn's. No temporal headache, no jaw claudication, no scalp tenderness, vision changes, carotidynia, cough  CONSTITUTIONAL: No fevers, night sweats or unintentional weight change. No acute distress, swollen glands  EYES: No vision change, diplopia, pain in eyes or red eyes   EARS, NOSE, MOUTH, THROAT: No tinnitus or hearing change, no epistaxis or nasal  discharge, no oral lesions, throat clear. Normal saliva pool.  No drymouth. No thyroid enlargement  CARDIOVASCULAR: No chest pain, palpitations, or pain with walking, no orthopnea or PND   RESPIRATORY: No dyspnea, cough, shortness of breath or wheezing. No pleurisy.   GI: No nausea, vomiting, diarrhea or constipation, no abdominal pain, or blood in stools.   : No change in urine, no dysuria or hematuria   MUSCKL: No swollen, tender, red or painful joints. No nodules. No enthesitis, plantar fascitis or heel pain. See above   INTEGUMENTARY: No concerning lesions or moles   NEURO: No loss of strength or sensation, no numbness or tingling, no tremor, no dizziness, no headache. No falls   ENDO: No polyuria or polydipsia, no temperature intolerance   HEME/LYMPH:No concerning bumps, bleeding problems, or swollen lymph nodes. No recent infections, hospitalizations or new illnesses.   ALLERGY: No environmental allergies   PSYCH:No depression or anxiety, no sleep problems.  Otherwise 14 point ROS obtained, reviewed and found negative.          Physical Exam:     Constitutional: WD-[id number] cooperative  Eyes: nl EOM, PERRLA, vision, conjunctiva, sclera  ENT: nl external ears, nose, hearing, lips, teeth, gums, throat. Nl saliva pool  No mucous membrane lesions, normal saliva pool  Neck: no mass or thyroid enlargement. non-tender.  Resp: lungs clear to auscultation, nl to palpation, nl breath sounds  CV: RRR, no murmurs, rubs or gallops, no edema  GI: no ABD mass or tenderness, no HSM. No RUQ pain.   : not tested  Lymph: no cervical, supraclavicular, inguinal or epitrochlear nodes  MSK: All other TMJ, neck, shoulder, elbow, wrist, MCP/PIP/DIP, spine, hip, knee, ankle, and foot MTP/IP joints were examined and  found normal. No active synovitis or deformity. Full ROM.  NL prayer sign. No periuncle erythema. Normal  strength. No dactylitis,  tenosynovitis, enthespathy. Negative MCP and MTP squeeze. No impingment signs of  shoulders. Negative Lhermitte's sign.   Skin: No nail pitting, alopecia, rash, nodules or lesions.   Neuro: nl cranial nerves, strength, sensation  Psych: nl judgement, orientation, memory, affect.         Labs/Imaging:   Reviewed medications, labs, imaging, and EMR.   Reviewed Rheumatology Lab Flowsheet & Lab Flowsheet    Results for orders placed or performed in visit on 05/17/17   Ferritin   Result Value Ref Range    Ferritin 806 (H) 26 - 388 ng/mL   Iron and iron binding capacity   Result Value Ref Range    Iron 42 35 - 180 ug/dL    Iron Binding Cap 210 (L) 240 - 430 ug/dL    Iron Saturation Index 20 15 - 46 %   CBC with platelets differential   Result Value Ref Range    WBC 4.9 4.0 - 11.0 10e9/L    RBC Count 3.20 (L) 4.4 - 5.9 10e12/L    Hemoglobin 9.3 (L) 13.3 - 17.7 g/dL    Hematocrit 29.0 (L) 40.0 - 53.0 %    MCV 91 78 - 100 fl    MCH 29.1 26.5 - 33.0 pg    MCHC 32.1 31.5 - 36.5 g/dL    RDW 16.5 (H) 10.0 - 15.0 %    Platelet Count 250 150 - 450 10e9/L    Diff Method Automated Method     % Neutrophils 73.8 %    % Lymphocytes 12.4 %    % Monocytes 10.7 %    % Eosinophils 2.3 %    % Basophils 0.2 %    % Immature Granulocytes 0.6 %    Nucleated RBCs 0 0 /100    Absolute Neutrophil 3.6 1.6 - 8.3 10e9/L    Absolute Lymphocytes 0.6 (L) 0.8 - 5.3 10e9/L    Absolute Monocytes 0.5 0.0 - 1.3 10e9/L    Absolute Eosinophils 0.1 0.0 - 0.7 10e9/L    Absolute Basophils 0.0 0.0 - 0.2 10e9/L    Abs Immature Granulocytes 0.0 0 - 0.4 10e9/L    Absolute Nucleated RBC 0.0    Protein  random urine   Result Value Ref Range    Protein Random Urine 0.91 g/L    Protein Total Urine g/gr Creatinine 0.67 (H) 0 - 0.2 g/g Cr   Renal panel   Result Value Ref Range    Sodium 139 133 - 144 mmol/L    Potassium 2.8 (L) 3.4 - 5.3 mmol/L    Chloride 94 94 - 109 mmol/L    Carbon Dioxide 32 20 - 32 mmol/L    Anion Gap 14 3 - 14 mmol/L    Glucose 132 (H) 70 - 99 mg/dL    Urea Nitrogen 163 (H) 7 - 30 mg/dL    Creatinine 5.80 (H) 0.66 - 1.25 mg/dL     GFR Estimate 10 (L) >60 mL/min/1.7m2    GFR Estimate If Black 12 (L) >60 mL/min/1.7m2    Calcium 6.7 (L) 8.5 - 10.1 mg/dL    Phosphorus 5.1 (H) 2.5 - 4.5 mg/dL    Albumin 2.6 (L) 3.4 - 5.0 g/dL   Creatinine urine calculation only   Result Value Ref Range    Creatinine Urine 136 mg/dL       Component      Latest Ref Rng & Units 4/19/2017 4/19/2017           2:47 PM  2:47 PM   WBC      4.0 - 11.0 10e9/L  14.4 (H)   RBC Count      4.4 - 5.9 10e12/L  3.12 (L)   Hemoglobin      13.3 - 17.7 g/dL 9.1 (L) 9.1 (L)   Hematocrit      40.0 - 53.0 % 27.5 (L) 27.7 (L)   MCV      78 - 100 fl  89   MCH      26.5 - 33.0 pg  29.2   MCHC      31.5 - 36.5 g/dL  32.9   RDW      10.0 - 15.0 %  16.1 (H)   Platelet Count      150 - 450 10e9/L  487 (H)   Diff Method        Automated Method   % Neutrophils      %  86.3   % Lymphocytes      %  3.6   % Monocytes      %  7.7   % Eosinophils      %  0.1   % Basophils      %  0.1   % Immature Granulocytes      %  2.2   Nucleated RBCs      0 /100  0   Absolute Neutrophil      1.6 - 8.3 10e9/L  12.5 (H)   Absolute Lymphocytes      0.8 - 5.3 10e9/L  0.5 (L)   Absolute Monocytes      0.0 - 1.3 10e9/L  1.1   Absolute Eosinophils      0.0 - 0.7 10e9/L  0.0   Absolute Basophils      0.0 - 0.2 10e9/L  0.0   Abs Immature Granulocytes      0 - 0.4 10e9/L  0.3   Absolute Nucleated RBC        0.0   Sodium      133 - 144 mmol/L 134    Potassium      3.4 - 5.3 mmol/L 4.3    Chloride      94 - 109 mmol/L 93 (L)    Carbon Dioxide      20 - 32 mmol/L 26    Anion Gap      3 - 14 mmol/L 14    Glucose      70 - 99 mg/dL 224 (H)    Urea Nitrogen      7 - 30 mg/dL 171 (H)    Creatinine      0.66 - 1.25 mg/dL 6.04 (H)    GFR Estimate      >60 mL/min/1.7m2 9 (L)    GFR Estimate If Black      >60 mL/min/1.7m2 11 (L)    Calcium      8.5 - 10.1 mg/dL 8.5    Phosphorus      2.5 - 4.5 mg/dL 4.1    Albumin      3.4 - 5.0 g/dL 2.4 (L)    Iron      35 - 180 ug/dL 63    Iron Binding Cap      240 - 430 ug/dL 213 (L)    Iron  Saturation Index      15 - 46 % 30    Protein Random Urine      g/L     Protein Total Urine g/gr Creatinine      0 - 0.2 g/g Cr     Ferritin      26 - 388 ng/mL 1509 (H)    Creatinine Urine      mg/dL         Results for orders placed or performed in visit on 05/17/17   CBC with platelets differential   Result Value Ref Range    WBC 4.9 4.0 - 11.0 10e9/L    RBC Count 3.20 (L) 4.4 - 5.9 10e12/L    Hemoglobin 9.3 (L) 13.3 - 17.7 g/dL    Hematocrit 29.0 (L) 40.0 - 53.0 %    MCV 91 78 - 100 fl    MCH 29.1 26.5 - 33.0 pg    MCHC 32.1 31.5 - 36.5 g/dL    RDW 16.5 (H) 10.0 - 15.0 %    Platelet Count 250 150 - 450 10e9/L    Diff Method Automated Method     % Neutrophils 73.8 %    % Lymphocytes 12.4 %    % Monocytes 10.7 %    % Eosinophils 2.3 %    % Basophils 0.2 %    % Immature Granulocytes 0.6 %    Nucleated RBCs 0 0 /100    Absolute Neutrophil 3.6 1.6 - 8.3 10e9/L    Absolute Lymphocytes 0.6 (L) 0.8 - 5.3 10e9/L    Absolute Monocytes 0.5 0.0 - 1.3 10e9/L    Absolute Eosinophils 0.1 0.0 - 0.7 10e9/L    Absolute Basophils 0.0 0.0 - 0.2 10e9/L    Abs Immature Granulocytes 0.0 0 - 0.4 10e9/L    Absolute Nucleated RBC 0.0    Protein  random urine   Result Value Ref Range    Protein Random Urine 0.91 g/L    Protein Total Urine g/gr Creatinine 0.67 (H) 0 - 0.2 g/g Cr   Renal panel   Result Value Ref Range    Sodium 139 133 - 144 mmol/L    Potassium 2.8 (L) 3.4 - 5.3 mmol/L    Chloride 94 94 - 109 mmol/L    Carbon Dioxide 32 20 - 32 mmol/L    Anion Gap 14 3 - 14 mmol/L    Glucose 132 (H) 70 - 99 mg/dL    Urea Nitrogen 163 (H) 7 - 30 mg/dL    Creatinine 5.80 (H) 0.66 - 1.25 mg/dL    GFR Estimate 10 (L) >60 mL/min/1.7m2    GFR Estimate If Black 12 (L) >60 mL/min/1.7m2    Calcium 6.7 (L) 8.5 - 10.1 mg/dL    Phosphorus 5.1 (H) 2.5 - 4.5 mg/dL    Albumin 2.6 (L) 3.4 - 5.0 g/dL   Creatinine urine calculation only   Result Value Ref Range    Creatinine Urine 136 mg/dL     Recent Labs   Lab Test  05/17/17   1214  05/03/17   7414   04/28/17   0842  04/19/17   1447  04/13/17   0651  04/12/17   0935   04/08/17   1515   04/09/15   0900   05/15/13   1418   WBC  4.9   --   12.0*  14.4*  10.2   --    < >  8.2   < >  6.8   < >   --    HGB  9.3*  9.7*  9.2*  9.1*  9.1*  8.4*   --    < >  8.3*   < >  8.8*   < >   --    HCT  29.0*  30.6*  28.8*  27.7*  27.5*  26.4*   --    < >  26.9*   < >  26.2*   < >   --    MCV  91   --   93  89  87   --    < >  89   < >  86   < >   --    PLT  250   --   263  487*  423   --    < >  494*   < >  339   < >   --    BUN  163*  173*  161*  171*  113*   --    < >  115*   < >  54*   < >  27   TSH   --    --    --    --    --   1.26   --    --    --   3.47   --   2.05   AST   --    --   11   --   16   --    --   13   < >  19   --   33   ALT   --    --   13   --   13   --    --   18   < >  25   --   36   ALKPHOS   --    --   129   --   90   --    --   92   < >  154*   --   100    < > = values in this interval not displayed.     TTE 4/10/2017  Interpretation Summary  Left ventricular systolic function is severely reduced with ejection fraction  estimated at 20-25% with severe global hypokinesis. The left ventricle is  severely dilated (LVIDd 7.60cm) with normal thickness.  Global right ventricular function is normal. Mild right ventricular dilation  is present.  The aortic valve is functionally bicuspid with fusion of the left and right  coronary cusps with sclerosis. There is mild to moderate eccentric aortic  insufficiency that is directed posteriorly.  The aortic root is dilated at the sinus of valsalva(4.6cm). There is a  moderate-sized aneurym involving the proximal ascending aorta (4.6cm).  Dilation of the inferior vena cava is present with normal respiratory  variation in diameter.  Compared to the previous study dated 2/2016, there has been interval reduction  in left ventricular systolic function.     4/11/2017 MRI Lumbar Spine:  Impression: Multilevel lumbar degenerative changes with moderate left  neural foraminal  narrowing at L5-S1. Additional multilevel mild spinal  canal and neural foraminal narrowing.      4/11/2017 MRI Right Hip  IMPRESSION:  1. Limited evaluation of the right hip due to patient's inability to tolerate the scan.  2. Subchondral cystic changes in the right hip acetabulum, with likely associated degenerative change, however this is not is not well  evaluated on the study that was obtained.  3. No marrow signal abnormalities in the right hemipelvis to suggest fracture, osteonecrosis, or marrow infiltration.  4. Diffuse soft tissue edema without atrophy or fatty infiltration within the muscles of the right hemipelvis, including the right  adductor muscles, right gluteus minimus muscle, as well as the right vastus lateralis and vastus intermedius muscles, findings are  nonspecific, however possibilities include myositis, inflammatory or infectious. Correlate clinically.            Education:   1. Educated on diagnosis, prognosis, labs, imaging, treatment options (including risks, benefits, risk of no treatment), medications (use, dose, side-effects, risks of medications including infection/cancer/bone marrow suppression, lab monitoring), infections what to do, plan of cares, goals of treatment.       Des Burgess APRN, CNP, MSN  [facility]  Department of Rheumatology & Autoimmune Disorders  [facility]: [phone] (opt.2 then opt. 1 scheduling, opt. 3 nurse)  [facility]: [phone]

## 2017-05-17 NOTE — PROGRESS NOTES
Rheumatology Visit     Murray Nicholson MRN# [id number]   YOB: 1955 Age: 62 year old     Date of visit: May 17, 2017  Primary care provider: Yahir Turcios          Assessment/Plan:     1. Acute gouty arthritis. Discussed/reviewed case with Dr. Gaston. MRI Rt hip showed cystic changes which can be degenerative arthritis or due to gout. +hx of LBP with MRI showing some bulging disc. Prednisone responsive. The left hip symptoms he reports are not as his prior gout flares (in toes) and seems to be associated with movement but does have prednisone response. His UA 4-28 was high at 17.9 but no recent CRP. Given all this, this left hip pain may be gout or OA  Or both and the back may be contributing. We will proceed with checking liver enzymes, uric acid, CRP and then increasing the alloupurinol to 100 mg day with labs [CMP, liver enzymes, UA, CRP and CBCD Q 2 weeks], do dual imaging CT scan of the hips with the gout protocol to evaluate for cystals and put him on prednisone 5 mg BID for prophylaxis while adjusting allopurinol. Labs in 2 weeks after the change then consider dose adjustment 200 mg day. In additin, we will refer him to PT. RTC with MD Dr. Gaston or MD as he's complex and will need to establish care with MD. We informed him this is not RA. He agrees with this plan and has no questions at this time.   Multiple risk factors (CKD, aggressive diuresis, severe hyperuricemia), and clinical symptoms of recurrent gout flares (R great toe acute arthritis in January which resolved with prednisone, recurrence of R great toe acute arthritis when started on allopurinol, R hip acute arthritis 3 weeks ago, resolving gradually and worsening 4-8 inpt). Acute gout treatment and urate-lowering therapy as detailed above. Plan to treat to target of serum uric acid <6 mg/dl.     The patient understood the rational for the diagnosis and treatment plan. Patient shared in the decision making. All questions were  "answered to best of my ability and the patient's satisfaction  Des MARTIN, CNP, MSN  [facility]  Department of Rheumatology & Autoimmune Disorders          History of Present Illness:   Murray Nicholson is a 62 year old male who presents as a f/u for acute gouty arthritis (UA 15.1 on 4-) due to renal impairment --consulted inpt with Dr. Jennifer Gaston 4-8     Review: CKD stage 4/5, CHF (systolic/diastolic), DM, hyperlipidemia, CAD, chronic anemia, HTN     Taking allopurinol 50 mg PO day (1/2 tab 100 mg) and on prednisone 40 mg day x 4 days, 20 mg day x 5 day then 10 mg day #50 on 4-14.     Recent inpt 4-8 to 4-13 \"ran out medications [Fluid overload on admission was most likely secondary to patient not taking lasix for last 2 weeks, cardiac dysfunction, and renal failure; TTE performed 4/10 showed significantly reduced EF (20%) compared to MARIANA performed 11/16 that showed EF of 40% felt per cardiology due to drug holiday--d/c with lasix 80 mg BID and metolazone 2.5mg day (new) with f/u MARIANA 3 mth; acute on chronic kidney injury--Transplant surgery was consulted for possible peritoneal dialysis catheter placement, but procedure was postponed until patient's cardiac function is optimized; LBP into groin and leg MRI of lumbar spine and right hip was performed 4/11. MRI showed edema of the soft tissues and muscles of the right hip. With patient's rapidly progressing symptoms and elevated CRP (120), differential included infection, inflammation and malignancy. Myositis is less likely with normal CK. Rheumatology was consulted and diagnosed patient with gouty arthritis of right hip. He was started on prednisone and allopurinol--much improved ROM; DM, chronic anemia, AAA, bicuspid AV]     4-8 while inpt Rheumatology consult with Dr. Jennifer Gaston. \"His features of right hip pain , decrease ROM along with presence of soft tissue edema in MRI elicit towards inflammatory arthritis. This is " monoarticular given his history and more subacute in onset. Other differentials could be pseudogout but given history of CKD, hyperuricemia would favor diagnoses of acute gouty arthritis. Also CRP elevation would be explained by this. Arthrocentesis not performed as he does not really have effusion and also hip joint is not as accessible as other joint but would certainly want to do arthrocentesis in the future for confirming diagnoses and this was explained to the patient.    Recommendations:  -prednisone 40 mg for 5 days, 20 for 5 days and then continue 10 mg Daily until rheumatology clinic visit  - start allopurinol at 50 mg daily along with prednisone ; make sure he has baseline labs including LFTs and CBC. Please order outpatient repeat LFTs and CBC in 2 weeks, prior to rheumatology clinic visit.   - Additionally, if ok with cardiology, suggest switching ACE inhibitor to losartan, which is uricosuric.     MRI right hip 4-2017   IMPRESSION:  1. Limited evaluation of the right hip due to patient's inability to tolerate the scan.  2. Subchondral cystic changes in the right hip acetabulum, with likely associated degenerative change, however this is not is not well  evaluated on the study that was obtained.  3. No marrow signal abnormalities in the right hemipelvis to suggest fracture, osteonecrosis, or marrow infiltration.  4. Diffuse soft tissue edema without atrophy or fatty infiltration within the muscles of the right hemipelvis, including the right  adductor muscles, right gluteus minimus muscle, as well as the right vastus lateralis and vastus intermedius muscles, findings are  nonspecific, however possibilities include myositis, inflammatory or infectious. Correlate clinically     May 17, 2017  Seen 4-8 Dr. Gaston who dx with acute gout--was having worsening right hip pain x 3 day, radiation to b/l thighs does have a hx of gouty arthritis with this previous dx made clinically and aspiration  "results--prednisone response in the past was dramatic (last time was 2017) hyperuricemia 15.1 with  during admission was attempted to start allopurinol but immediately flared up. Hx previous flares right great toe.   - Seen nephrology Patricia MARTIN [CKD5 stable creat 6, diabetic nephropathy; severe CM not appropriate for surgery. Started on Prednisone 40 mg qd x 5 dys. Beginning taper today with 20 mg qd x 5 dy, 10 mg qd x 5 dys, then d/c. He continues on allopurinol 50 mg qd. Feeling improved. Plan UA check next visit with neph in 2 wks]    Uric acid 17.9  5-3 seen neph on prednisone 10 mg day and was told to taper to 5 mg day x 3 day  5-9 seen PCP to get prednisone to get to mom's  in [facility]. Tapered off prednisone then developed left hip pain. Was on prednisone 40 mg x 1 week, 20 mg x 1 week then 10 mg x 1 wk so was off then instructed to restart prednisone 20mg day x 5 day then 10 mg day till sees rheum. Used a wheelchair for this memorial, came back on Friday.   Today seen Dr. Caraballo (neph) --on allopurinol 50 mg once a day. Needs peritoneal dialysis and will be seeing cardiology (heart and fluid f/u) make sure this is stable to put in the peritoneal dialysis. No fluid retention and \"doing good\" watches his ankles.   Mom passed away 2 weeks ago so had to fly out to [facility], hips were bad migratory right then left hip more deep in his left groin, and finds himself pushing on his groin so less pain and will get this sharp pain and the leg will may give out like is kim so hard to move it but now is fine. This is the same as in the hospital but now on the left hip area. This Monday felt this symptoms lasted about 30 sec so massages this area. Left hip started about 1 week ago, before was in the right hip so has shifted now. Now on prednisone at 10 mg day and did not take a dose today. This last time unsure if the prednisone helped or not. No N/T down the leg, but maybe left foot comes and " "goes more at night. Left hip hard to like get out of the car but this shifts in the hips. Mornings are fine now hands, wrists, or joints of hands, ankles, toes or other joints.   Told inpt was gout and maybe early RA. Gout for about 1 yr [flare were in his big toes right and toe then the whole foot hurts so hard to move the foot or walk]. Last time flared in the feet was about 6 weeks ago around time inpt. Today more this foot is more his gouty type.  Now taking allopurinol for 1/2 tab a day. He's thinking his left hip is arthritis. Mild in the toes pain no swelling. Does not feel this is gout.   Tolerating the medications prednisone and allopurinol. Prednisone is \"like a miracle\" drug   SHEELA not on CPAP \"ok\"  Some LBP he was dealing with prior to the inpt seen chiropractor now feels some left       Past Medical/Surgical History:   Injuries-  No Blood transfusions  Medical-  Past Medical History:   Diagnosis Date     (HFpEF) heart failure with preserved ejection fraction (H)      Allergic rhinitis, cause unspecified      Anemia of chronic kidney failure      Ascending aortic aneurysm (H)      Bicuspid aortic valve      CAD (coronary artery disease)      Chronic kidney disease, stage 5 (H)      Congestive heart failure, unspecified      Dyslipidemia      Esophageal reflux      Hypersomnia with sleep apnea, unspecified      Hypertension      MGUS (monoclonal gammopathy of unknown significance)      SHEELA (obstructive sleep apnea)      Type 2 diabetes mellitus (H)        Surgical-  Past Surgical History:   Procedure Laterality Date     ABDOMEN SURGERY      Hernia     LAPAROSCOPIC HERNIORRHAPHY INGUINAL BILATERAL Bilateral 7/24/2015    Procedure: LAPAROSCOPIC HERNIORRHAPHY INGUINAL BILATERAL;  Surgeon: Bobby Mcconnell MD;  Location:  OR     NO HISTORY OF SURGERY               Family/Social History:   Family-  No autoimmune disorders, psoriasis, UC, crohn's, SLE, RA, PsA.   No MS  Never met dad  Mother-passed at 90 " y/o recently mild stroke, sudden decline, dementia   Father-CAD, DM  Brother-healthy some kidney stones (had different father)   2 sons-healthy     Social-  Quit Alcohol. Quit years ago Smoking. 2 Children. NO IVDU or drug use. Works in retail              Allergies/Medications:     Allergies   Allergen Reactions     Cats      Throat tightness     Penicillins Hives     Seasonal Allergies      rhinitis     Shrimp      Throat closes        Outpatient Prescriptions Marked as Taking for the 5/17/17 encounter (Office Visit) with Des Burgess APRN CNP   Medication Sig     predniSONE (DELTASONE) 10 MG tablet Take 20 mg per day for 5 days and then 10mg per day for 5 days.     HYDROcodone-acetaminophen (NORCO) 5-325 MG per tablet Take 1 tablet by mouth every 4 hours as needed for pain     metolazone (ZAROXOLYN) 2.5 MG tablet Take 1 tablet (2.5 mg) by mouth daily     glipiZIDE (GLUCOTROL) 5 MG tablet Take 1 tablet by mouth. TAKE 1 TABLET BY MOUTH DAILY BEFORE A MEAL     allopurinol (ZYLOPRIM) 100 MG tablet Take 0.5 tablets (50 mg) by mouth daily     atorvastatin (LIPITOR) 40 MG tablet Take 1 tablet (40 mg) by mouth daily     isosorbide mononitrate (IMDUR) 60 MG 24 hr tablet Take 1 tablet (60 mg) by mouth daily     metoprolol (TOPROL XL) 100 MG 24 hr tablet Take 1 tablet (100 mg) by mouth daily     losartan (COZAAR) 100 MG tablet Take 1 tablet (100 mg) by mouth daily     hydrALAZINE (APRESOLINE) 50 MG tablet Take 1 tablet (50 mg) by mouth 3 times daily     [DISCONTINUED] potassium chloride SA (K-DUR/KLOR-CON M) 20 MEQ CR tablet Take 1 tablet (20 mEq) by mouth daily     [DISCONTINUED] sodium bicarbonate 650 MG tablet Take 2 tablets (1,300 mg) by mouth 3 times daily     acetaminophen (TYLENOL) 325 MG tablet Take 1-2 tablets (325-650 mg) by mouth every 4 hours as needed for mild pain Do not take more than 10 tablets in 24 hours     furosemide (LASIX) 80 MG tablet Take 1 tablet (80 mg) by mouth 2 times daily In the morning  and at 3 PM     docusate sodium (COLACE) 100 MG capsule Take 1 capsule (100 mg) by mouth 2 times daily     omeprazole (PRILOSEC) 20 MG CR capsule Take 20 mg by mouth daily     darbepoetin emily (ARANESP, ALBUMIN FREE,) 100 MCG/0.5ML injection Inject 0.5 mLs (100 mcg) Subcutaneous every 14 days As needed for hgb<10g/dL.  If Hgb increases >1 point in 2 weeks (if blood transfusion given, use hgb PRIOR to this), SYSTOLIC BP > 180 mmHg or hgb>=10g/dL, HOLD DOSE. Dose must be within 1 week of Hgb.  Per anemia protocol with Brice Caraballo MD     albuterol (PROAIR HFA/PROVENTIL HFA/VENTOLIN HFA) 108 (90 BASE) MCG/ACT Inhaler Inhale 2 puffs into the lungs every 6 hours as needed for shortness of breath / dyspnea or wheezing     [DISCONTINUED] Cholecalciferol (VITAMIN D3 PO) Take 5,000 Units by mouth daily     loratadine (CLARITIN) 10 MG tablet Take 10 mg by mouth daily as needed Reported on 5/3/2017     ASPIRIN 81 MG OR TABS Take 1 tablet (81 mg) by mouth at bedtime            Review of Systems:   Negative raynaud s phenomena, hairloss, sun sensitivities, keratoconjunctivitis sicca, pleurisy, inflammatory joint symptoms, significant rashes like malar, oral/nasal or ulcerations, inflammatory eye disease, inflammatory bowel disease, dactylitis, tenosynovitis, or enthespathy, blood clots,  psoriasis, UC, crohn's. No temporal headache, no jaw claudication, no scalp tenderness, vision changes, carotidynia, cough  CONSTITUTIONAL: No fevers, night sweats or unintentional weight change. No acute distress, swollen glands  EYES: No vision change, diplopia, pain in eyes or red eyes   EARS, NOSE, MOUTH, THROAT: No tinnitus or hearing change, no epistaxis or nasal discharge, no oral lesions, throat clear. Normal saliva pool.  No drymouth. No thyroid enlargement  CARDIOVASCULAR: No chest pain, palpitations, or pain with walking, no orthopnea or PND   RESPIRATORY: No dyspnea, cough, shortness of breath or wheezing. No pleurisy.   GI: No  nausea, vomiting, diarrhea or constipation, no abdominal pain, or blood in stools.   : No change in urine, no dysuria or hematuria   MUSCKL: No swollen, tender, red or painful joints. No nodules. No enthesitis, plantar fascitis or heel pain. See above   INTEGUMENTARY: No concerning lesions or moles   NEURO: No loss of strength or sensation, no numbness or tingling, no tremor, no dizziness, no headache. No falls   ENDO: No polyuria or polydipsia, no temperature intolerance   HEME/LYMPH:No concerning bumps, bleeding problems, or swollen lymph nodes. No recent infections, hospitalizations or new illnesses.   ALLERGY: No environmental allergies   PSYCH:No depression or anxiety, no sleep problems.  Otherwise 14 point ROS obtained, reviewed and found negative.          Physical Exam:     Constitutional: WD-[id number] cooperative  Eyes: nl EOM, PERRLA, vision, conjunctiva, sclera  ENT: nl external ears, nose, hearing, lips, teeth, gums, throat. Nl saliva pool  No mucous membrane lesions, normal saliva pool  Neck: no mass or thyroid enlargement. non-tender.  Resp: lungs clear to auscultation, nl to palpation, nl breath sounds  CV: RRR, no murmurs, rubs or gallops, no edema  GI: no ABD mass or tenderness, no HSM. No RUQ pain.   : not tested  Lymph: no cervical, supraclavicular, inguinal or epitrochlear nodes  MSK: All other TMJ, neck, shoulder, elbow, wrist, MCP/PIP/DIP, spine, hip, knee, ankle, and foot MTP/IP joints were examined and  found normal. No active synovitis or deformity. Full ROM.  NL prayer sign. No periuncle erythema. Normal  strength. No dactylitis,  tenosynovitis, enthespathy. Negative MCP and MTP squeeze. No impingment signs of shoulders. Negative Lhermitte's sign.   Skin: No nail pitting, alopecia, rash, nodules or lesions.   Neuro: nl cranial nerves, strength, sensation  Psych: nl judgement, orientation, memory, affect.         Labs/Imaging:   Reviewed medications, labs, imaging, and EMR.   Reviewed  Rheumatology Lab Flowsheet & Lab Flowsheet    Results for orders placed or performed in visit on 05/17/17   Ferritin   Result Value Ref Range    Ferritin 806 (H) 26 - 388 ng/mL   Iron and iron binding capacity   Result Value Ref Range    Iron 42 35 - 180 ug/dL    Iron Binding Cap 210 (L) 240 - 430 ug/dL    Iron Saturation Index 20 15 - 46 %   CBC with platelets differential   Result Value Ref Range    WBC 4.9 4.0 - 11.0 10e9/L    RBC Count 3.20 (L) 4.4 - 5.9 10e12/L    Hemoglobin 9.3 (L) 13.3 - 17.7 g/dL    Hematocrit 29.0 (L) 40.0 - 53.0 %    MCV 91 78 - 100 fl    MCH 29.1 26.5 - 33.0 pg    MCHC 32.1 31.5 - 36.5 g/dL    RDW 16.5 (H) 10.0 - 15.0 %    Platelet Count 250 150 - 450 10e9/L    Diff Method Automated Method     % Neutrophils 73.8 %    % Lymphocytes 12.4 %    % Monocytes 10.7 %    % Eosinophils 2.3 %    % Basophils 0.2 %    % Immature Granulocytes 0.6 %    Nucleated RBCs 0 0 /100    Absolute Neutrophil 3.6 1.6 - 8.3 10e9/L    Absolute Lymphocytes 0.6 (L) 0.8 - 5.3 10e9/L    Absolute Monocytes 0.5 0.0 - 1.3 10e9/L    Absolute Eosinophils 0.1 0.0 - 0.7 10e9/L    Absolute Basophils 0.0 0.0 - 0.2 10e9/L    Abs Immature Granulocytes 0.0 0 - 0.4 10e9/L    Absolute Nucleated RBC 0.0    Protein  random urine   Result Value Ref Range    Protein Random Urine 0.91 g/L    Protein Total Urine g/gr Creatinine 0.67 (H) 0 - 0.2 g/g Cr   Renal panel   Result Value Ref Range    Sodium 139 133 - 144 mmol/L    Potassium 2.8 (L) 3.4 - 5.3 mmol/L    Chloride 94 94 - 109 mmol/L    Carbon Dioxide 32 20 - 32 mmol/L    Anion Gap 14 3 - 14 mmol/L    Glucose 132 (H) 70 - 99 mg/dL    Urea Nitrogen 163 (H) 7 - 30 mg/dL    Creatinine 5.80 (H) 0.66 - 1.25 mg/dL    GFR Estimate 10 (L) >60 mL/min/1.7m2    GFR Estimate If Black 12 (L) >60 mL/min/1.7m2    Calcium 6.7 (L) 8.5 - 10.1 mg/dL    Phosphorus 5.1 (H) 2.5 - 4.5 mg/dL    Albumin 2.6 (L) 3.4 - 5.0 g/dL   Creatinine urine calculation only   Result Value Ref Range    Creatinine Urine 136  mg/dL       Component      Latest Ref Rng & Units 4/19/2017 4/19/2017           2:47 PM  2:47 PM   WBC      4.0 - 11.0 10e9/L  14.4 (H)   RBC Count      4.4 - 5.9 10e12/L  3.12 (L)   Hemoglobin      13.3 - 17.7 g/dL 9.1 (L) 9.1 (L)   Hematocrit      40.0 - 53.0 % 27.5 (L) 27.7 (L)   MCV      78 - 100 fl  89   MCH      26.5 - 33.0 pg  29.2   MCHC      31.5 - 36.5 g/dL  32.9   RDW      10.0 - 15.0 %  16.1 (H)   Platelet Count      150 - 450 10e9/L  487 (H)   Diff Method        Automated Method   % Neutrophils      %  86.3   % Lymphocytes      %  3.6   % Monocytes      %  7.7   % Eosinophils      %  0.1   % Basophils      %  0.1   % Immature Granulocytes      %  2.2   Nucleated RBCs      0 /100  0   Absolute Neutrophil      1.6 - 8.3 10e9/L  12.5 (H)   Absolute Lymphocytes      0.8 - 5.3 10e9/L  0.5 (L)   Absolute Monocytes      0.0 - 1.3 10e9/L  1.1   Absolute Eosinophils      0.0 - 0.7 10e9/L  0.0   Absolute Basophils      0.0 - 0.2 10e9/L  0.0   Abs Immature Granulocytes      0 - 0.4 10e9/L  0.3   Absolute Nucleated RBC        0.0   Sodium      133 - 144 mmol/L 134    Potassium      3.4 - 5.3 mmol/L 4.3    Chloride      94 - 109 mmol/L 93 (L)    Carbon Dioxide      20 - 32 mmol/L 26    Anion Gap      3 - 14 mmol/L 14    Glucose      70 - 99 mg/dL 224 (H)    Urea Nitrogen      7 - 30 mg/dL 171 (H)    Creatinine      0.66 - 1.25 mg/dL 6.04 (H)    GFR Estimate      >60 mL/min/1.7m2 9 (L)    GFR Estimate If Black      >60 mL/min/1.7m2 11 (L)    Calcium      8.5 - 10.1 mg/dL 8.5    Phosphorus      2.5 - 4.5 mg/dL 4.1    Albumin      3.4 - 5.0 g/dL 2.4 (L)    Iron      35 - 180 ug/dL 63    Iron Binding Cap      240 - 430 ug/dL 213 (L)    Iron Saturation Index      15 - 46 % 30    Protein Random Urine      g/L     Protein Total Urine g/gr Creatinine      0 - 0.2 g/g Cr     Ferritin      26 - 388 ng/mL 1509 (H)    Creatinine Urine      mg/dL         Results for orders placed or performed in visit on 05/17/17   CBC with  platelets differential   Result Value Ref Range    WBC 4.9 4.0 - 11.0 10e9/L    RBC Count 3.20 (L) 4.4 - 5.9 10e12/L    Hemoglobin 9.3 (L) 13.3 - 17.7 g/dL    Hematocrit 29.0 (L) 40.0 - 53.0 %    MCV 91 78 - 100 fl    MCH 29.1 26.5 - 33.0 pg    MCHC 32.1 31.5 - 36.5 g/dL    RDW 16.5 (H) 10.0 - 15.0 %    Platelet Count 250 150 - 450 10e9/L    Diff Method Automated Method     % Neutrophils 73.8 %    % Lymphocytes 12.4 %    % Monocytes 10.7 %    % Eosinophils 2.3 %    % Basophils 0.2 %    % Immature Granulocytes 0.6 %    Nucleated RBCs 0 0 /100    Absolute Neutrophil 3.6 1.6 - 8.3 10e9/L    Absolute Lymphocytes 0.6 (L) 0.8 - 5.3 10e9/L    Absolute Monocytes 0.5 0.0 - 1.3 10e9/L    Absolute Eosinophils 0.1 0.0 - 0.7 10e9/L    Absolute Basophils 0.0 0.0 - 0.2 10e9/L    Abs Immature Granulocytes 0.0 0 - 0.4 10e9/L    Absolute Nucleated RBC 0.0    Protein  random urine   Result Value Ref Range    Protein Random Urine 0.91 g/L    Protein Total Urine g/gr Creatinine 0.67 (H) 0 - 0.2 g/g Cr   Renal panel   Result Value Ref Range    Sodium 139 133 - 144 mmol/L    Potassium 2.8 (L) 3.4 - 5.3 mmol/L    Chloride 94 94 - 109 mmol/L    Carbon Dioxide 32 20 - 32 mmol/L    Anion Gap 14 3 - 14 mmol/L    Glucose 132 (H) 70 - 99 mg/dL    Urea Nitrogen 163 (H) 7 - 30 mg/dL    Creatinine 5.80 (H) 0.66 - 1.25 mg/dL    GFR Estimate 10 (L) >60 mL/min/1.7m2    GFR Estimate If Black 12 (L) >60 mL/min/1.7m2    Calcium 6.7 (L) 8.5 - 10.1 mg/dL    Phosphorus 5.1 (H) 2.5 - 4.5 mg/dL    Albumin 2.6 (L) 3.4 - 5.0 g/dL   Creatinine urine calculation only   Result Value Ref Range    Creatinine Urine 136 mg/dL     Recent Labs   Lab Test  05/17/17   1214  05/03/17   1339  04/28/17   0842  04/19/17   1447  04/13/17   0651  04/12/17   0935   04/08/17   1515   04/09/15   0900   05/15/13   1418   WBC  4.9   --   12.0*  14.4*  10.2   --    < >  8.2   < >  6.8   < >   --    HGB  9.3*  9.7*  9.2*  9.1*  9.1*  8.4*   --    < >  8.3*   < >  8.8*   < >   --     HCT  29.0*  30.6*  28.8*  27.7*  27.5*  26.4*   --    < >  26.9*   < >  26.2*   < >   --    MCV  91   --   93  89  87   --    < >  89   < >  86   < >   --    PLT  250   --   263  487*  423   --    < >  494*   < >  339   < >   --    BUN  163*  173*  161*  171*  113*   --    < >  115*   < >  54*   < >  27   TSH   --    --    --    --    --   1.26   --    --    --   3.47   --   2.05   AST   --    --   11   --   16   --    --   13   < >  19   --   33   ALT   --    --   13   --   13   --    --   18   < >  25   --   36   ALKPHOS   --    --   129   --   90   --    --   92   < >  154*   --   100    < > = values in this interval not displayed.     TTE 4/10/2017  Interpretation Summary  Left ventricular systolic function is severely reduced with ejection fraction  estimated at 20-25% with severe global hypokinesis. The left ventricle is  severely dilated (LVIDd 7.60cm) with normal thickness.  Global right ventricular function is normal. Mild right ventricular dilation  is present.  The aortic valve is functionally bicuspid with fusion of the left and right  coronary cusps with sclerosis. There is mild to moderate eccentric aortic  insufficiency that is directed posteriorly.  The aortic root is dilated at the sinus of valsalva(4.6cm). There is a  moderate-sized aneurym involving the proximal ascending aorta (4.6cm).  Dilation of the inferior vena cava is present with normal respiratory  variation in diameter.  Compared to the previous study dated 2/2016, there has been interval reduction  in left ventricular systolic function.     4/11/2017 MRI Lumbar Spine:  Impression: Multilevel lumbar degenerative changes with moderate left  neural foraminal narrowing at L5-S1. Additional multilevel mild spinal  canal and neural foraminal narrowing.      4/11/2017 MRI Right Hip  IMPRESSION:  1. Limited evaluation of the right hip due to patient's inability to tolerate the scan.  2. Subchondral cystic changes in the right hip acetabulum,  with likely associated degenerative change, however this is not is not well  evaluated on the study that was obtained.  3. No marrow signal abnormalities in the right hemipelvis to suggest fracture, osteonecrosis, or marrow infiltration.  4. Diffuse soft tissue edema without atrophy or fatty infiltration within the muscles of the right hemipelvis, including the right  adductor muscles, right gluteus minimus muscle, as well as the right vastus lateralis and vastus intermedius muscles, findings are  nonspecific, however possibilities include myositis, inflammatory or infectious. Correlate clinically.            Education:   1. Educated on diagnosis, prognosis, labs, imaging, treatment options (including risks, benefits, risk of no treatment), medications (use, dose, side-effects, risks of medications including infection/cancer/bone marrow suppression, lab monitoring), infections what to do, plan of cares, goals of treatment.       Des MARTIN, CNP, MSN  [facility]  Department of Rheumatology & Autoimmune Disorders  [facility]: [phone] (opt.2 then opt. 1 scheduling, opt. 3 nurse)  [facility]: [phone]

## 2017-05-17 NOTE — MR AVS SNAPSHOT
After Visit Summary   5/17/2017    Murray Nicholson    MRN: [id number]           Patient Information     Date Of Birth          1955        Visit Information        Provider Department      5/17/2017 2:00 PM Des Burgess APRN [facility] Rheumatology        Today's Diagnoses     Acute gouty arthritis    -  1    Hip pain, left        Osteoarthritis of both hips, unspecified osteoarthritis type           Follow-ups after your visit        Additional Services     PHYSICAL THERAPY REFERRAL       *This therapy referral will be filtered to a centralized scheduling office at [facility] and the patient will receive a call to schedule an appointment at a [facility] location most convenient for them. *     [facility] provides Physical Therapy evaluation and treatment and many specialty services across the [facility] system.  If requesting a specialty program, please choose from the list below.    If you have not heard from the scheduling office within 2 business days, please call [phone] for all locations, with the exception of [place], please call [phone].  Treatment: Evaluation & Treatment  Special Instructions/Modalities: eval and treat   Special Programs: None    Please be aware that coverage of these services is subject to the terms and limitations of your health insurance plan.  Call member services at your health plan with any benefit or coverage questions.      **Note to Provider:  If you are referring outside of [facility] for the therapy appointment, please list the name of the location in the  special instructions  above, print the referral and give to the patient to schedule the appointment.                  Your next 10 appointments already scheduled     May 31, 2017 10:45 AM CDT   Lab with [facility] ([facility])    [address] [place] [address]  1st Floor  [facility] [address] [phone]            May 31,  2017 11:30 AM CDT   Nurse Visit with [facility] Nurse   [facility] Solid Organ Transplant ([facility])    [address] [facility]  3rd [facility] [address]   [phone]            Jun 21, 2017 10:30 AM CDT   Lab with  LAB   [facility] Lab ([facility])    [address] [address]   [phone]            Jun 21, 2017 11:30 AM CDT   (Arrive by 11:00 AM)   Return Visit with Brice Caraballo MD   [facility] Nephrology ([facility])    [address] [place] [address]   [phone]            Aug 02, 2017  1:00 PM CDT   Lab with  LAB   [facility] Lab ([facility])    [address] [place] [address]   [phone]            Aug 02, 2017  2:30 PM CDT   (Arrive by 2:00 PM)   Return Visit with Brice Caraballo MD   [facility] Nephrology ([facility])    [address] [place] [address]   [phone]              Future tests that were ordered for you today     Open Standing Orders        Priority Remaining Interval Expires Ordered    CBC with platelets differential Routine 4/4 every 2 weeks 7/17/2017 5/17/2017    Comprehensive metabolic panel Routine 4/4 every 2 weeks 7/17/2017 5/17/2017    Uric acid Routine 4/4 every 2 weeks 7/17/2017 5/17/2017    CRP inflammation Routine 4/4 every 2 weeks 7/17/2017 5/17/2017            Who to contact     If you have questions or need follow up information about today's clinic visit or your schedule please contact [facility] RHEUMATOLOGY directly at [phone].  Normal or non-critical lab and imaging results will be communicated to you by MyChart, letter or phone within 4 business days after the clinic has received the results. If you do not hear from us within 7 days, please contact the clinic through MyChart or phone. If you have a critical or  abnormal lab result, we will notify you by phone as soon as possible.  Submit refill requests through [employer] or call your pharmacy and they will forward the refill request to us. Please allow 3 business days for your refill to be completed.          Additional Information About Your Visit        [employer] Information     [employer] gives you secure access to your electronic health record. If you see a primary care provider, you can also send messages to your care team and make appointments. If you have questions, please call your primary care clinic.  If you do not have a primary care provider, please call [phone] and they will assist you.        Care EveryWhere ID     This is your Care EveryWhere ID. This could be used by other organizations to access your [facility] medical records  [id number]         Blood Pressure from Last 3 Encounters:   05/17/17 110/67   05/09/17 128/71   05/03/17 126/70    Weight from Last 3 Encounters:   05/17/17 82.6 kg (182 lb)   05/03/17 82.8 kg (182 lb 9.6 oz)   04/28/17 83.6 kg (184 lb 6.4 oz)              We Performed the Following     PHYSICAL THERAPY REFERRAL          Today's Medication Changes          These changes are accurate as of: 5/17/17  3:36 PM.  If you have any questions, ask your nurse or doctor.               Start taking these medicines.        Dose/Directions    calcitRIOL 0.25 MCG capsule   Commonly known as:  ROCALTROL   Used for:  Hypocalcemia   Started by:  Brice Caraballo MD        Dose:  0.25 mcg   Take 1 capsule (0.25 mcg) by mouth daily   Quantity:  90 capsule   Refills:  0         These medicines have changed or have updated prescriptions.        Dose/Directions    potassium chloride SA 20 MEQ CR tablet   Commonly known as:  K-DUR/KLOR-CON M   This may have changed:  how much to take   Used for:  Combined systolic and diastolic congestive heart failure, unspecified congestive heart failure chronicity (H), Hypervolemia, unspecified hypervolemia type,  Hospital discharge follow-up   Changed by:  Brice Caraballo MD        Dose:  40 mEq   Take 2 tablets (40 mEq) by mouth daily   Quantity:  90 tablet   Refills:  0       * predniSONE 10 MG tablet   Commonly known as:  DELTASONE   This may have changed:  Another medication with the same name was added. Make sure you understand how and when to take each.   Used for:  Multiple joint pain   Changed by:  Leia De Leon APRN CNP        Take 20 mg per day for 5 days and then 10mg per day for 5 days.   Quantity:  15 tablet   Refills:  0       * predniSONE 5 MG tablet   Commonly known as:  DELTASONE   This may have changed:  You were already taking a medication with the same name, and this prescription was added. Make sure you understand how and when to take each.   Used for:  Acute gouty arthritis   Changed by:  Des Burgess APRN CNP        Dose:  5 mg   Take 1 tablet (5 mg) by mouth 2 times daily   Quantity:  60 tablet   Refills:  1       * Notice:  This list has 2 medication(s) that are the same as other medications prescribed for you. Read the directions carefully, and ask your doctor or other care provider to review them with you.      Stop taking these medicines if you haven't already. Please contact your care team if you have questions.     VITAMIN D3 PO   Stopped by:  Brice Caraballo MD                Where to get your medicines      These medications were sent to [id number] - [place], [place] - [address], [place] [address]     Phone:  [phone]     calcitRIOL 0.25 MCG capsule    potassium chloride SA 20 MEQ CR tablet    predniSONE 5 MG tablet                Primary Care Provider Office Phone # Fax #    Yahir Turcios [phone]       [place] [address]  [facility] [address]        Thank you!     Thank you for choosing [facility] RHEUMATOLOGY  for your care. Our goal is always to  provide you with excellent care. Hearing back from our patients is one way we can continue to improve our services. Please take a few minutes to complete the written survey that you may receive in the mail after your visit with us. Thank you!             Your Updated Medication List - Protect others around you: Learn how to safely use, store and throw away your medicines at www.disposemymeds.org.          This list is accurate as of: 5/17/17  3:36 PM.  Always use your most recent med list.                   Brand Name Dispense Instructions for use    acetaminophen 325 MG tablet    TYLENOL    100 tablet    Take 1-2 tablets (325-650 mg) by mouth every 4 hours as needed for mild pain Do not take more than 10 tablets in 24 hours       albuterol 108 (90 BASE) MCG/ACT Inhaler    PROAIR HFA/PROVENTIL HFA/VENTOLIN HFA    1 Inhaler    Inhale 2 puffs into the lungs every 6 hours as needed for shortness of breath / dyspnea or wheezing       allopurinol 100 MG tablet    ZYLOPRIM    90 tablet    Take 0.5 tablets (50 mg) by mouth daily       aspirin 81 MG tablet      Take 1 tablet (81 mg) by mouth at bedtime       atorvastatin 40 MG tablet    LIPITOR    90 tablet    Take 1 tablet (40 mg) by mouth daily       calcitRIOL 0.25 MCG capsule    ROCALTROL    90 capsule    Take 1 capsule (0.25 mcg) by mouth daily       darbepoetin emily 100 MCG/0.5ML injection    ARANESP (ALBUMIN FREE)    0.5 mL    Inject 0.5 mLs (100 mcg) Subcutaneous every 14 days As needed for hgb<10g/dL.  If Hgb increases >1 point in 2 weeks (if blood transfusion given, use hgb PRIOR to this), SYSTOLIC BP > 180 mmHg or hgb>=10g/dL, HOLD DOSE. Dose must be within 1 week of Hgb.  Per anemia protocol with Brice Caraballo MD       docusate sodium 100 MG capsule    COLACE    60 capsule    Take 1 capsule (100 mg) by mouth 2 times daily       furosemide 80 MG tablet    LASIX    120 tablet    Take 1 tablet (80 mg) by mouth 2 times daily In the morning and at 3 PM       glipiZIDE  5 MG tablet    GLUCOTROL    90 tablet    Take 1 tablet by mouth. TAKE 1 TABLET BY MOUTH DAILY BEFORE A MEAL       hydrALAZINE 50 MG tablet    APRESOLINE    270 tablet    Take 1 tablet (50 mg) by mouth 3 times daily       HYDROcodone-acetaminophen 5-325 MG per tablet    NORCO    15 tablet    Take 1 tablet by mouth every 4 hours as needed for pain       isosorbide mononitrate 60 MG 24 hr tablet    IMDUR    90 tablet    Take 1 tablet (60 mg) by mouth daily       loratadine 10 MG tablet    CLARITIN     Take 10 mg by mouth daily as needed Reported on 5/3/2017       losartan 100 MG tablet    COZAAR    90 tablet    Take 1 tablet (100 mg) by mouth daily       metolazone 2.5 MG tablet    ZAROXOLYN    90 tablet    Take 1 tablet (2.5 mg) by mouth daily       metoprolol 100 MG 24 hr tablet    TOPROL XL    90 tablet    Take 1 tablet (100 mg) by mouth daily       omeprazole 20 MG CR capsule    priLOSEC     Take 20 mg by mouth daily       potassium chloride SA 20 MEQ CR tablet    K-DUR/KLOR-CON M    90 tablet    Take 2 tablets (40 mEq) by mouth daily       * predniSONE 10 MG tablet    DELTASONE    15 tablet    Take 20 mg per day for 5 days and then 10mg per day for 5 days.       * predniSONE 5 MG tablet    DELTASONE    60 tablet    Take 1 tablet (5 mg) by mouth 2 times daily       * Notice:  This list has 2 medication(s) that are the same as other medications prescribed for you. Read the directions carefully, and ask your doctor or other care provider to review them with you.

## 2017-05-17 NOTE — NURSING NOTE
"Chief Complaint   Patient presents with     Consult     Consult for RA       Initial There were no vitals taken for this visit. Estimated body mass index is 26.88 kg/(m^2) as calculated from the following:    Height as of an earlier encounter on 5/17/17: 1.753 m (5' 9\").    Weight as of an earlier encounter on 5/17/17: 82.6 kg (182 lb).  Medication Reconciliation: complete   Trang Moore CMA    "

## 2017-05-17 NOTE — TELEPHONE ENCOUNTER
Order dual imaging of hips with gout protocol--will ask Dr Gaston to help order   UA high with CRP 39. Dr. Caraballo already reviewed and asked the patient to double his allopurinol to 100 mg day  Labs in 2 weeks then every 2 weeks

## 2017-05-18 ENCOUNTER — CARE COORDINATION (OUTPATIENT)
Dept: NEPHROLOGY | Facility: CLINIC | Age: 62
End: 2017-05-18

## 2017-05-18 NOTE — PROGRESS NOTES
Reason for Call    Received message that soonest patient could be seen in cardiology is in July. Did send RN CC a message to ry to work him in sooner, will update Dr. Caraballo. Discussed yesterday's lab results and medication changes. Patient denied further questions.      Plan    1. Increase allopurinol dose as directed (100mg daily)    Patient was given an opportunity to ask questions and have those questions answered to his satisfaction.  Patient verbalized understanding of instructions provided and agreed to plan of care.

## 2017-05-23 ENCOUNTER — PRE VISIT (OUTPATIENT)
Dept: RHEUMATOLOGY | Facility: CLINIC | Age: 62
End: 2017-05-23

## 2017-05-23 ENCOUNTER — PRE VISIT (OUTPATIENT)
Dept: CARDIOLOGY | Facility: CLINIC | Age: 62
End: 2017-05-23

## 2017-05-23 DIAGNOSIS — I25.10 CAD (CORONARY ARTERY DISEASE): ICD-10-CM

## 2017-05-23 DIAGNOSIS — I71.21 ASCENDING AORTIC ANEURYSM (H): Primary | ICD-10-CM

## 2017-05-23 DIAGNOSIS — I50.30 (HFPEF) HEART FAILURE WITH PRESERVED EJECTION FRACTION (H): ICD-10-CM

## 2017-05-23 NOTE — TELEPHONE ENCOUNTER
1.  Date/reason for appt:  6/02/17   Gout    2.  Referring provider:  LEWIS Holt    3.  Call to patient (Yes / No - short description):  No, referred.    Records reviewed.  All records are in [facility] and imaging is in PACS.

## 2017-05-23 NOTE — TELEPHONE ENCOUNTER
Procedures since last visit January 2016    ECHO (4/10/2016)  Left ventricular systolic function is severely reduced with ejection fraction  estimated at 20-25% with severe global hypokinesis. The left ventricle is  severely dilated (LVIDd 7.60cm) with normal thickness.  Global right ventricular function is normal. Mild right ventricular dilation  is present.  The aortic valve is functionally bicuspid with fusion of the left and right  coronary cusps with sclerosis. There is mild to moderate eccentric aortic  insufficiency that is directed posteriorly.  The aortic root is dilated at the sinus of valsalva(4.6cm). There is a  moderate-sized aneurym involving the proximal ascending aorta (4.6cm).  Dilation of the inferior vena cava is present with normal respiratory  variation in diameter.  Compared to the previous study dated 2/2016, there has been interval reduction  in left ventricular systolic function.

## 2017-05-25 ENCOUNTER — OFFICE VISIT (OUTPATIENT)
Dept: CARDIOLOGY | Facility: CLINIC | Age: 62
End: 2017-05-25
Attending: INTERNAL MEDICINE
Payer: COMMERCIAL

## 2017-05-25 VITALS
BODY MASS INDEX: 26.91 KG/M2 | HEIGHT: 69 IN | OXYGEN SATURATION: 98 % | SYSTOLIC BLOOD PRESSURE: 109 MMHG | DIASTOLIC BLOOD PRESSURE: 67 MMHG | HEART RATE: 99 BPM | WEIGHT: 181.7 LBS

## 2017-05-25 DIAGNOSIS — I25.10 CORONARY ARTERY DISEASE INVOLVING NATIVE CORONARY ARTERY OF NATIVE HEART WITHOUT ANGINA PECTORIS: ICD-10-CM

## 2017-05-25 DIAGNOSIS — I71.21 ASCENDING AORTIC ANEURYSM (H): ICD-10-CM

## 2017-05-25 PROCEDURE — 93010 ELECTROCARDIOGRAM REPORT: CPT | Mod: ZP | Performed by: INTERNAL MEDICINE

## 2017-05-25 PROCEDURE — 99213 OFFICE O/P EST LOW 20 MIN: CPT | Mod: ZF

## 2017-05-25 PROCEDURE — 99215 OFFICE O/P EST HI 40 MIN: CPT | Mod: GC | Performed by: INTERNAL MEDICINE

## 2017-05-25 PROCEDURE — 93005 ELECTROCARDIOGRAM TRACING: CPT | Mod: ZF

## 2017-05-25 RX ORDER — LIDOCAINE 40 MG/G
CREAM TOPICAL
Status: CANCELLED | OUTPATIENT
Start: 2017-05-25

## 2017-05-25 RX ORDER — ASPIRIN 81 MG/1
81 TABLET ORAL DAILY
Status: CANCELLED | OUTPATIENT
Start: 2017-05-25

## 2017-05-25 RX ORDER — SODIUM CHLORIDE 9 MG/ML
INJECTION, SOLUTION INTRAVENOUS CONTINUOUS
Status: CANCELLED | OUTPATIENT
Start: 2017-05-25

## 2017-05-25 ASSESSMENT — ENCOUNTER SYMPTOMS
EXERCISE INTOLERANCE: 0
INCREASED ENERGY: 1
WEIGHT LOSS: 1
MUSCLE WEAKNESS: 0
DECREASED APPETITE: 0
LEG PAIN: 1
PALPITATIONS: 0
SYNCOPE: 0
DIZZINESS: 0
MEMORY LOSS: 0
SPEECH CHANGE: 0
HYPERTENSION: 0
SEIZURES: 0
DECREASED CONCENTRATION: 1
COUGH: 0
JOINT SWELLING: 0
POSTURAL DYSPNEA: 0
ALTERED TEMPERATURE REGULATION: 0
STIFFNESS: 0
NECK PAIN: 1
CLAUDICATION: 0
HYPOTENSION: 0
TINGLING: 0
LOSS OF CONSCIOUSNESS: 0
HEMOPTYSIS: 0
PANIC: 0
DISTURBANCES IN COORDINATION: 0
WHEEZING: 0
PARALYSIS: 0
POLYPHAGIA: 0
FEVER: 0
SNORES LOUDLY: 0
NUMBNESS: 0
MUSCLE CRAMPS: 0
RESPIRATORY PAIN: 0
ORTHOPNEA: 0
LIGHT-HEADEDNESS: 0
NIGHT SWEATS: 0
FATIGUE: 1
SPUTUM PRODUCTION: 0
NERVOUS/ANXIOUS: 0
SLEEP DISTURBANCES DUE TO BREATHING: 0
MYALGIAS: 1
WEAKNESS: 0
SHORTNESS OF BREATH: 0
WEIGHT GAIN: 0
LEG SWELLING: 0
TACHYCARDIA: 0
DYSPNEA ON EXERTION: 0
CHILLS: 0
POLYDIPSIA: 0
HEADACHES: 0
DEPRESSION: 1
TREMORS: 0
ARTHRALGIAS: 1
HALLUCINATIONS: 0
BACK PAIN: 1
INSOMNIA: 0

## 2017-05-25 ASSESSMENT — PAIN SCALES - GENERAL: PAINLEVEL: NO PAIN (0)

## 2017-05-25 NOTE — NURSING NOTE
Chief Complaint   Patient presents with     Follow Up For     manage AS/needs clearance for PD cath placement/recent admit with fall in EF/EKG/last visit January 2016

## 2017-05-25 NOTE — PATIENT INSTRUCTIONS
Patient Instructions:  It was a pleasure to see you in the cardiology clinic today.      If you have any questions, you can reach my nurse, Gema Arias, at [phone].  Press Option #1 for the [facility], and then press Option #3 for nursing.  We are encouraging the use of [employer] to communicate with your HealthCare Provider    Note the new medications: none  Stop the following medications: none    Follow the American Heart Association Diet and Lifestyle recommendations:  Limit saturated fat, trans fat, sodium, red meat, sweets and sugar-sweetened beverages. If you choose to eat red meat, compare labels and select the leanest cuts available.  Aim for at least 150 minutes of moderate physical activity or 75 minutes of vigorous physical activity - or an equal combination of both - each week.    The results from today include: NONE  Please follow up with Dr. Montrell Posada post cath    Sincerely,  Montrell Posada MD     If you have an urgent need after hours (8:00 am to 4:30 pm) please call [phone] and ask for the cardiology fellow on call.          You have been scheduled [facility] coronary [facility]   Please arrive at the [facility] Waiting Room [facility]      Please do not eat or drink anything after midnight. You should take all your morning medications as prescribed with sips of water. You need to take a baby aspirin ( 81mg ) the day of the procedure      When you arrive you will be escorted back to the pre procedure area called 2A. Here they will insert an IV, draw labs, and obtain a short medical history. Please bring an updated list of your current medications.    A physician will come and talk with you about the procedure and obtain consent.    A nurse from the Cardiac Catheterization Lab will then escort you to the procedure area. You will be receiving sedation during the procedure so you will need someone to drive you to and from the hospital.    After the procedure you will  recover for a short period (2 - 6 hours) on 6B. You will be discharged with instructions for post procedure care.  However, depending on what the angiogram shows you may have to have stents placed and this might require an overnight stay. We ask that you bring a small bag of necessities for your comfort if you would need to stay overnight. DO NOT BRING ANY VALUABLES!

## 2017-05-25 NOTE — MR AVS SNAPSHOT
After Visit Summary   5/25/2017    Murray Nicholson    MRN: [id number]           Patient Information     Date Of Birth          1955        Visit Information        Provider Department      5/25/2017 8:30 AM Montrell Posada MD [facility]        Today's Diagnoses     CAD (coronary artery disease)        Ascending aortic aneurysm (H)          Care Instructions    Patient Instructions:  It was a pleasure to see you in the cardiology clinic today.      If you have any questions, you can reach my nurse, Gema Arias, at [phone].  Press Option #1 for the [facility], and then press Option #3 for nursing.  We are encouraging the use of [employer] to communicate with your HealthCare Provider    Note the new medications: none  Stop the following medications: none    Follow the American Heart Association Diet and Lifestyle recommendations:  Limit saturated fat, trans fat, sodium, red meat, sweets and sugar-sweetened beverages. If you choose to eat red meat, compare labels and select the leanest cuts available.  Aim for at least 150 minutes of moderate physical activity or 75 minutes of vigorous physical activity - or an equal combination of both - each week.    The results from today include: NONE  Please follow up with Dr. Montrell Posada post cath    Sincerely,  Montrell Posada MD     If you have an urgent need after hours (8:00 am to 4:30 pm) please call [phone] and ask for the cardiology fellow on call.          You have been scheduled for a coronary angiogram on  [facility]   Please arrive at the [facility] Waiting Room at  [facility]      Please do not eat or drink anything after midnight. You should take all your morning medications as prescribed with sips of water. You need to take a baby aspirin ( 81mg ) the day of the procedure      When you arrive you will be escorted back to the pre procedure area called 2A. Here they will insert an IV, draw labs, and obtain a short medical  history. Please bring an updated list of your current medications.    A physician will come and talk with you about the procedure and obtain consent.    A nurse from the Cardiac Catheterization Lab will then escort you to the procedure area. You will be receiving sedation during the procedure so you will need someone to drive you to and from the hospital.    After the procedure you will recover for a short period (2 - 6 hours) on 6B. You will be discharged with instructions for post procedure care.  However, depending on what the angiogram shows you may have to have stents placed and this might require an overnight stay. We ask that you bring a small bag of necessities for your comfort if you would need to stay overnight. DO NOT BRING ANY VALUABLES!            Follow-ups after your visit        Follow-up notes from your care team     Return if symptoms worsen or fail to improve.      Your next 10 appointments already scheduled     May 31, 2017 10:45 AM CDT   Lab with  LAB   [facility] ([facility])    [address] [place] [id number]   [phone]            May 31, 2017 11:30 AM CDT   Nurse Visit with [facility] Nurse   [facility] Trans[facility] ([facility])    [address] [place] [id number]   [phone]            Jun 02, 2017 10:00 AM CDT   (Arrive by 9:45 AM)   New Patient Visit with Darvin Tang MD   [facility] Rheumatology ([facility])    [address] [place] [id number]   [phone]            Jun 21, 2017 10:30 AM CDT   Lab with  LAB   [facility] ([facility])    [address] [place] [id number]   [phone]            Jun 21, 2017 11:30 AM CDT   (Arrive by 11:00 AM)   Return Visit with Brice Caraballo MD   [facility] Nephrology ([facility])    [address] [place] [address]  "Se  3rd [facility] [id number] [phone]            Aug 02, 2017  1:00 PM CDT   Lab with  LAB   [facility] ([facility])    [address] [place] [id number] [phone]            Aug 02, 2017  2:30 PM CDT   (Arrive by 2:00 PM)   Return Visit with Brice Caraballo MD   [facility] ([facility])    [address] [place] [id number] [phone]              Who to contact     If you have questions or need follow up information about today's clinic visit or your schedule please contact [facility] directly at [phone].  Normal or non-critical lab and imaging results will be communicated to you by [employer], letter or phone within 4 business days after the clinic has received the results. If you do not hear from us within 7 days, please contact the clinic through [employer] or phone. If you have a critical or abnormal lab result, we will notify you by phone as soon as possible.  Submit refill requests through [employer] or call your pharmacy and they will forward the refill request to us. Please allow 3 business days for your refill to be completed.          Additional Information About Your Visit        [employer] Information     [employer] gives you secure access to your electronic health record. If you see a primary care provider, you can also send messages to your care team and make appointments. If you have questions, please call your primary care clinic.  If you do not have a primary care provider, please call [phone] and they will assist you.        Care EveryWhere ID     This is your Care EveryWhere ID. This could be used by other organizations to access your [facility] medical records  [id number]        Your Vitals Were     Pulse Height Pulse Oximetry BMI (Body Mass Index)          99 1.753 m (5' 9\") 98% 26.83 kg/m2         Blood Pressure from Last 3 Encounters:   05/25/17 " 109/67   05/17/17 110/67   05/09/17 128/71    Weight from Last 3 Encounters:   05/25/17 82.4 kg (181 lb 11.2 oz)   05/17/17 82.6 kg (182 lb)   05/03/17 82.8 kg (182 lb 9.6 oz)              We Performed the Following     EKG 12-lead, tracing only (Future)        Primary Care Provider Office Phone # Fax #    Yahir Turcios [phone]       [address]  [facility] [address]        Thank you!     Thank you for choosing [facility]  for your care. Our goal is always to provide you with excellent care. Hearing back from our patients is one way we can continue to improve our services. Please take a few minutes to complete the written survey that you may receive in the mail after your visit with us. Thank you!             Your Updated Medication List - Protect others around you: Learn how to safely use, store and throw away your medicines at www.disposemymeds.org.          This list is accurate as of: 5/25/17 10:14 AM.  Always use your most recent med list.                   Brand Name Dispense Instructions for use    acetaminophen 325 MG tablet    TYLENOL    100 tablet    Take 1-2 tablets (325-650 mg) by mouth every 4 hours as needed for mild pain Do not take more than 10 tablets in 24 hours       albuterol 108 (90 BASE) MCG/ACT Inhaler    PROAIR HFA/PROVENTIL HFA/VENTOLIN HFA    1 Inhaler    Inhale 2 puffs into the lungs every 6 hours as needed for shortness of breath / dyspnea or wheezing       allopurinol 100 MG tablet    ZYLOPRIM    90 tablet    Take 1 tablet (100 mg) by mouth daily       aspirin 81 MG tablet      Take 1 tablet (81 mg) by mouth at bedtime       atorvastatin 40 MG tablet    LIPITOR    90 tablet    Take 1 tablet (40 mg) by mouth daily       calcitRIOL 0.25 MCG capsule    ROCALTROL    90 capsule    Take 1 capsule (0.25 mcg) by mouth daily       darbepoetin emily 100 MCG/0.5ML injection    ARANESP (ALBUMIN FREE)    0.5 mL    Inject 0.5 mLs (100 mcg)  Subcutaneous every 14 days As needed for hgb<10g/dL.  If Hgb increases >1 point in 2 weeks (if blood transfusion given, use hgb PRIOR to this), SYSTOLIC BP > 180 mmHg or hgb>=10g/dL, HOLD DOSE. Dose must be within 1 week of Hgb.  Per anemia protocol with Brice Caraballo MD       docusate sodium 100 MG capsule    COLACE    60 capsule    Take 1 capsule (100 mg) by mouth 2 times daily       furosemide 80 MG tablet    LASIX    120 tablet    Take 1 tablet (80 mg) by mouth 2 times daily In the morning and at 3 PM       glipiZIDE 5 MG tablet    GLUCOTROL    90 tablet    Take 1 tablet by mouth. TAKE 1 TABLET BY MOUTH DAILY BEFORE A MEAL       hydrALAZINE 50 MG tablet    APRESOLINE    270 tablet    Take 1 tablet (50 mg) by mouth 3 times daily       HYDROcodone-acetaminophen 5-325 MG per tablet    NORCO    15 tablet    Take 1 tablet by mouth every 4 hours as needed for pain       isosorbide mononitrate 60 MG 24 hr tablet    IMDUR    90 tablet    Take 1 tablet (60 mg) by mouth daily       loratadine 10 MG tablet    CLARITIN     Take 10 mg by mouth daily as needed Reported on 5/3/2017       losartan 100 MG tablet    COZAAR    90 tablet    Take 1 tablet (100 mg) by mouth daily       metolazone 2.5 MG tablet    ZAROXOLYN    90 tablet    Take 1 tablet (2.5 mg) by mouth daily       metoprolol 100 MG 24 hr tablet    TOPROL XL    90 tablet    Take 1 tablet (100 mg) by mouth daily       omeprazole 20 MG CR capsule    priLOSEC     Take 20 mg by mouth daily       potassium chloride SA 20 MEQ CR tablet    K-DUR/KLOR-CON M    90 tablet    Take 2 tablets (40 mEq) by mouth daily       * predniSONE 10 MG tablet    DELTASONE    15 tablet    Take 20 mg per day for 5 days and then 10mg per day for 5 days.       * predniSONE 5 MG tablet    DELTASONE    60 tablet    Take 1 tablet (5 mg) by mouth 2 times daily       * Notice:  This list has 2 medication(s) that are the same as other medications prescribed for you. Read the directions carefully, and  ask your doctor or other care provider to review them with you.
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
## 2017-05-25 NOTE — NURSING NOTE
Discussed purpose, preparation, procedure and when to expect results reported back to the patient. Patient demonstrated understanding of this information and agreed to call with further questions or concerns.  Diet: Patient instructed regarding a heart healthy diet, including discussion of reduced fat and sodium intake. Patient demonstrated understanding of this information and agreed to call with further questions or concerns.  Left Heart Catheterization: Patient given Coronary Angiogram and Angioplasty: A Patient's Guide booklet. Patient was instructed regarding left heart catheterization, including discussion of the indication, procedure, preparation, intra-procedural steps, and recovery at home. Patient demonstrated understanding of this information and agreed to call with further questions or concerns.  Med Reconcile: Reviewed and verified all current medications with the patient. The updated medication list was printed and given to the patient.  Return Appointment: Patient given instructions regarding scheduling next clinic visit. Patient demonstrated understanding of this information and agreed to call with further questions or concerns.  Patient stated he understood all health information given and agreed to call with further questions or concerns.
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
## 2017-05-26 ENCOUNTER — TELEPHONE (OUTPATIENT)
Dept: FAMILY MEDICINE | Facility: CLINIC | Age: 62
End: 2017-05-26

## 2017-05-26 LAB — INTERPRETATION ECG - MUSE: NORMAL

## 2017-05-26 NOTE — TELEPHONE ENCOUNTER
S-(situation): Coworker has pneumonia and wondering what his risks are related to this.  He does share the same keyboard with this person as they all share computer and desk space at his work place. Pt has not sx, no cough, no fever. .     B-(background): pt had pneumonia about 3 months ago so was concerned about his risks  Pt prescribed prednisone for gout on 5/17 but on no other specific immune suppressing drugs  Pt did have pneumonia vaccine 9/23/15     A-(assessment): watchful waiting    R-(recommendations): Explained some pneumonias are contagious, some are not . At this point pt advised to cleanse work area with sanitizing  and use good hand washing technique. If fever, cough, weakness, shortness of breath, lack of appetite or any other concerning sx develop he will need to be seen .   Pt agrees to plan    Shauna Clemons RN, BSN

## 2017-05-31 ENCOUNTER — TELEPHONE (OUTPATIENT)
Dept: PHARMACY | Facility: CLINIC | Age: 62
End: 2017-05-31

## 2017-05-31 ENCOUNTER — ALLIED HEALTH/NURSE VISIT (OUTPATIENT)
Dept: TRANSPLANT | Facility: CLINIC | Age: 62
End: 2017-05-31
Attending: INTERNAL MEDICINE
Payer: COMMERCIAL

## 2017-05-31 DIAGNOSIS — M25.552 HIP PAIN, LEFT: ICD-10-CM

## 2017-05-31 DIAGNOSIS — M10.9 ACUTE GOUTY ARTHRITIS: ICD-10-CM

## 2017-05-31 DIAGNOSIS — M16.0 OSTEOARTHRITIS OF BOTH HIPS, UNSPECIFIED OSTEOARTHRITIS TYPE: ICD-10-CM

## 2017-05-31 DIAGNOSIS — N18.5 CKD (CHRONIC KIDNEY DISEASE) STAGE 5, GFR LESS THAN 15 ML/MIN (H): ICD-10-CM

## 2017-05-31 DIAGNOSIS — D63.1 ANEMIA IN STAGE 5 CHRONIC KIDNEY DISEASE (H): Primary | ICD-10-CM

## 2017-05-31 DIAGNOSIS — N18.5 ANEMIA IN STAGE 5 CHRONIC KIDNEY DISEASE (H): Primary | ICD-10-CM

## 2017-05-31 LAB
ALBUMIN SERPL-MCNC: 2.7 G/DL (ref 3.4–5)
ALP SERPL-CCNC: 97 U/L (ref 40–150)
ALT SERPL W P-5'-P-CCNC: 16 U/L (ref 0–70)
ANION GAP SERPL CALCULATED.3IONS-SCNC: 13 MMOL/L (ref 3–14)
AST SERPL W P-5'-P-CCNC: 18 U/L (ref 0–45)
BASOPHILS # BLD AUTO: 0 10E9/L (ref 0–0.2)
BASOPHILS NFR BLD AUTO: 0.1 %
BILIRUB SERPL-MCNC: 0.5 MG/DL (ref 0.2–1.3)
BUN SERPL-MCNC: 146 MG/DL (ref 7–30)
CALCIUM SERPL-MCNC: 7.6 MG/DL (ref 8.5–10.1)
CHLORIDE SERPL-SCNC: 93 MMOL/L (ref 94–109)
CO2 SERPL-SCNC: 33 MMOL/L (ref 20–32)
CREAT SERPL-MCNC: 5.36 MG/DL (ref 0.66–1.25)
CRP SERPL-MCNC: 15.9 MG/L (ref 0–8)
DIFFERENTIAL METHOD BLD: ABNORMAL
EOSINOPHIL # BLD AUTO: 0.1 10E9/L (ref 0–0.7)
EOSINOPHIL NFR BLD AUTO: 1.4 %
ERYTHROCYTE [DISTWIDTH] IN BLOOD BY AUTOMATED COUNT: 16.2 % (ref 10–15)
GFR SERPL CREATININE-BSD FRML MDRD: 11 ML/MIN/1.7M2
GLUCOSE SERPL-MCNC: 173 MG/DL (ref 70–99)
HCT VFR BLD AUTO: 31.1 % (ref 40–53)
HGB BLD-MCNC: 9.9 G/DL (ref 13.3–17.7)
IMM GRANULOCYTES # BLD: 0.1 10E9/L (ref 0–0.4)
IMM GRANULOCYTES NFR BLD: 0.6 %
LYMPHOCYTES # BLD AUTO: 0.8 10E9/L (ref 0.8–5.3)
LYMPHOCYTES NFR BLD AUTO: 9.7 %
MCH RBC QN AUTO: 29.2 PG (ref 26.5–33)
MCHC RBC AUTO-ENTMCNC: 31.8 G/DL (ref 31.5–36.5)
MCV RBC AUTO: 92 FL (ref 78–100)
MONOCYTES # BLD AUTO: 0.6 10E9/L (ref 0–1.3)
MONOCYTES NFR BLD AUTO: 7 %
NEUTROPHILS # BLD AUTO: 6.5 10E9/L (ref 1.6–8.3)
NEUTROPHILS NFR BLD AUTO: 81.2 %
NRBC # BLD AUTO: 0 10*3/UL
NRBC BLD AUTO-RTO: 0 /100
PLATELET # BLD AUTO: 302 10E9/L (ref 150–450)
POTASSIUM SERPL-SCNC: 3.2 MMOL/L (ref 3.4–5.3)
PROT SERPL-MCNC: 6.8 G/DL (ref 6.8–8.8)
RBC # BLD AUTO: 3.39 10E12/L (ref 4.4–5.9)
SODIUM SERPL-SCNC: 139 MMOL/L (ref 133–144)
URATE SERPL-MCNC: 14.7 MG/DL (ref 3.5–7.2)
WBC # BLD AUTO: 8 10E9/L (ref 4–11)

## 2017-05-31 PROCEDURE — 36415 COLL VENOUS BLD VENIPUNCTURE: CPT | Performed by: NURSE PRACTITIONER

## 2017-05-31 PROCEDURE — 85025 COMPLETE CBC W/AUTO DIFF WBC: CPT | Performed by: NURSE PRACTITIONER

## 2017-05-31 PROCEDURE — 96372 THER/PROPH/DIAG INJ SC/IM: CPT

## 2017-05-31 PROCEDURE — 86140 C-REACTIVE PROTEIN: CPT | Performed by: NURSE PRACTITIONER

## 2017-05-31 PROCEDURE — 84550 ASSAY OF BLOOD/URIC ACID: CPT | Performed by: NURSE PRACTITIONER

## 2017-05-31 PROCEDURE — 80053 COMPREHEN METABOLIC PANEL: CPT | Performed by: NURSE PRACTITIONER

## 2017-05-31 PROCEDURE — 25000128 H RX IP 250 OP 636: Mod: ZF | Performed by: INTERNAL MEDICINE

## 2017-05-31 RX ADMIN — DARBEPOETIN ALFA 100 MCG: 100 INJECTION, SOLUTION INTRAVENOUS; SUBCUTANEOUS at 11:47

## 2017-05-31 NOTE — NURSING NOTE
Frequency: 14 days  Most recent or today's HGB: 9.9   Date: 17  Date of lat dose: 17  HGB associated with last dose given: 9.3    Blood Pressure:142/81    Diagnosis: Anemia in ckd, Cks stage 5    Ordered by: Brice Caraballo MD  VIS Offered: yes    Double Checked by: Trang jacobson    See MAR for administration details    Pt's first name, last name and  verified prior to medication administration, injection given without complications or questions.   RUPAL GE CMA

## 2017-05-31 NOTE — TELEPHONE ENCOUNTER
Anemia Management Note  SUBJECTIVE/OBJECTIVE:  Referred by Dr. Brice Caraballo on 2015.  Primary Diagnosis: Anemia in Chronic Kidney Disease (N18.5 D63.1)   Secondary Diagnosis: Chronic Kidney Disease, Stage V (N18.5)  Hgb goal range: 9-10  Epo/Darbo: Aranesp 100 mcg every 2 weeks as needed - in clinic  RX order expires on 18  Iron regimen: Ferrous Sulfate TID - hold 17  Lab orders  on 2017 In Epic    Anemia Latest Ref Rng & Units 2017 2017 2017 2017 5/3/2017 2017 2017   ANASTASIYA Dose - 100 mcg - - - 100 mcg 100 mcg 100 mcg   Hemoglobin 13.3 - 17.7 g/dL - 9.1(L) 9.1(L) 9.2(L) 9.7(L) 9.3(L) 9.9(L)   IV Iron Dose - - - - - - - -   TSAT 15 - 46 % - 30 - - - 20 -   Ferritin 26 - 388 ng/mL - 1509(H) - - - 806(H) -     BP Readings from Last 3 Encounters:   17 109/67   17 110/67   17 128/71     Wt Readings from Last 2 Encounters:   17 181 lb 11.2 oz (82.4 kg)   17 182 lb (82.6 kg)     ASSESSMENT:  Hgb:at goal - received dose in clinic - recommend continue current regimen  TSat: not at goal (>30%) but ferritin >500ng/mL.  IV iron not indicated at this time per anemia protocol. Continue to hold ferrous sulfate.    PLAN:  RTC for hgb then aranesp if needed in 2 week(s)  appt for Dr Caraballo and lorenzo scheduled for     Orders needed to be renewed (for next follow-up date) [facility]: None    Iron labs due:      Plan discussed with: no call made, appt made  Plan provided by:  Zelda    NEXT FOLLOW-UP DATE:      Anemia Management Service  Zelda Rich,EllisD and Rosa Marcial CPhT  Phone: [phone]  Fax: [phone]

## 2017-05-31 NOTE — PROGRESS NOTES
Seeing Dr. Tang Friday for care of his gout and can review this with him when he comes in then titrate his allopurinol. UA down 14 but still elevated. CRP down to 16 from 39. I will also copy his Dr. Caraballo and cardiology on his labs

## 2017-05-31 NOTE — TELEPHONE ENCOUNTER
Patient is scheduled with you Friday for his gout management and complex hx. He has not scheduled his dual energy CT of the hips to evaluate for gout (also Dr. Morse said we need to call radiology and talk to them to make sure they do the gout protocol. Lastly, please change the labs to your name after you formally see him. I am out of the clinic the rest of the week    Des

## 2017-05-31 NOTE — MR AVS SNAPSHOT
After Visit Summary   5/31/2017    Murray Nicholson    MRN: [id number]           Patient Information     Date Of Birth          1955        Visit Information        Provider Department      5/31/2017 11:30 AM Nurse, Rosi [facility] Solid Organ Transplant        Today's Diagnoses     Anemia in stage 5 chronic kidney disease (H)    -  1    CKD (chronic kidney disease) stage 5, GFR less than 15 ml/min (H)           Follow-ups after your visit        Your next 10 appointments already scheduled     Jun 02, 2017 10:00 AM CDT   (Arrive by 9:45 AM)   New Patient Visit with Darvin Tang MD   [facility] Rheumatology ([facility])    [address] [place] [address]  3rd [facility] [address]   [phone]            Jun 21, 2017 10:30 AM CDT   Lab with  LAB   [facility] Lab ([facility])    [address] [place] [address]   [phone]            Jun 21, 2017 11:30 AM CDT   (Arrive by 11:00 AM)   Return Visit with Brice Caraballo MD   [facility] Nephrology ([facility])    [address] [place] [address]   [phone]            Jun 22, 2017   Procedure with Esteban Arvizu MD   [facility] [place], Same Day Surgery (--)    [address] [facility] [address]   [phone]            Jul 05, 2017  1:00 PM CDT   (Arrive by 12:45 PM)   Post-Op with VERONICA Torres [facility] Solid Organ Transplant ([facility])    [address] [place] [address]   [phone]            Aug 02, 2017  1:00 PM CDT   Lab with  LAB    Health Lab ([facility])    [address] [place] [address]   [phone]            Aug 02, 2017  2:30 PM CDT   (Arrive by 2:00 PM)   Return Visit with Brice Caraballo MD   [facility] Nephrology ([facility])    [address] [place] [address]  [facility] [address] [phone]              Who to contact     If you have questions or need follow up information about today's clinic visit or your schedule please contact [facility] directly at [phone].  Normal or non-critical lab and imaging results will be communicated to you by MyChart, letter or phone within 4 business days after the clinic has received the results. If you do not hear from us within 7 days, please contact the clinic through TheySayhart or phone. If you have a critical or abnormal lab result, we will notify you by phone as soon as possible.  Submit refill requests through Cylande or call your pharmacy and they will forward the refill request to us. Please allow 3 business days for your refill to be completed.          Additional Information About Your Visit        TheySayharSnapOne Information     Cylande gives you secure access to your electronic health record. If you see a primary care provider, you can also send messages to your care team and make appointments. If you have questions, please call your primary care clinic.  If you do not have a primary care provider, please call [phone] and they will assist you.        Care EveryWhere ID     This is your Care EveryWhere ID. This could be used by other organizations to access your [place] medical records  [id number]         Blood Pressure from Last 3 Encounters:   05/25/17 109/67   05/17/17 110/67   05/09/17 128/71    Weight from Last 3 Encounters:   05/25/17 82.4 kg (181 lb 11.2 oz)   05/17/17 82.6 kg (182 lb)   05/03/17 82.8 kg (182 lb 9.6 oz)              Today, you had the following     No orders found for display       Primary Care Provider Office Phone # Fax #    Yahir Turcios [phone]       [place] [address]  [facility] [address]        Thank you!     Thank you for choosing [facility]  for your care. Our goal is always to provide you with  excellent care. Hearing back from our patients is one way we can continue to improve our services. Please take a few minutes to complete the written survey that you may receive in the mail after your visit with us. Thank you!             Your Updated Medication List - Protect others around you: Learn how to safely use, store and throw away your medicines at www.disposemymeds.org.          This list is accurate as of: 5/31/17 11:52 AM.  Always use your most recent med list.                   Brand Name Dispense Instructions for use    acetaminophen 325 MG tablet    TYLENOL    100 tablet    Take 1-2 tablets (325-650 mg) by mouth every 4 hours as needed for mild pain Do not take more than 10 tablets in 24 hours       albuterol 108 (90 BASE) MCG/ACT Inhaler    PROAIR HFA/PROVENTIL HFA/VENTOLIN HFA    1 Inhaler    Inhale 2 puffs into the lungs every 6 hours as needed for shortness of breath / dyspnea or wheezing       allopurinol 100 MG tablet    ZYLOPRIM    90 tablet    Take 1 tablet (100 mg) by mouth daily       aspirin 81 MG tablet      Take 1 tablet (81 mg) by mouth at bedtime       atorvastatin 40 MG tablet    LIPITOR    90 tablet    Take 1 tablet (40 mg) by mouth daily       calcitRIOL 0.25 MCG capsule    ROCALTROL    90 capsule    Take 1 capsule (0.25 mcg) by mouth daily       darbepoetin emily 100 MCG/0.5ML injection    ARANESP (ALBUMIN FREE)    0.5 mL    Inject 0.5 mLs (100 mcg) Subcutaneous every 14 days As needed for hgb<10g/dL.  If Hgb increases >1 point in 2 weeks (if blood transfusion given, use hgb PRIOR to this), SYSTOLIC BP > 180 mmHg or hgb>=10g/dL, HOLD DOSE. Dose must be within 1 week of Hgb.  Per anemia protocol with Brice Caraballo MD       docusate sodium 100 MG capsule    COLACE    60 capsule    Take 1 capsule (100 mg) by mouth 2 times daily       furosemide 80 MG tablet    LASIX    120 tablet    Take 1 tablet (80 mg) by mouth 2 times daily In the morning and at 3 PM       glipiZIDE 5 MG tablet     GLUCOTROL    90 tablet    Take 1 tablet by mouth. TAKE 1 TABLET BY MOUTH DAILY BEFORE A MEAL       hydrALAZINE 50 MG tablet    APRESOLINE    270 tablet    Take 1 tablet (50 mg) by mouth 3 times daily       HYDROcodone-acetaminophen 5-325 MG per tablet    NORCO    15 tablet    Take 1 tablet by mouth every 4 hours as needed for pain       isosorbide mononitrate 60 MG 24 hr tablet    IMDUR    90 tablet    Take 1 tablet (60 mg) by mouth daily       loratadine 10 MG tablet    CLARITIN     Take 10 mg by mouth daily as needed Reported on 5/3/2017       losartan 100 MG tablet    COZAAR    90 tablet    Take 1 tablet (100 mg) by mouth daily       metolazone 2.5 MG tablet    ZAROXOLYN    90 tablet    Take 1 tablet (2.5 mg) by mouth daily       metoprolol 100 MG 24 hr tablet    TOPROL XL    90 tablet    Take 1 tablet (100 mg) by mouth daily       omeprazole 20 MG CR capsule    priLOSEC     Take 20 mg by mouth daily       potassium chloride SA 20 MEQ CR tablet    K-DUR/KLOR-CON M    90 tablet    Take 2 tablets (40 mEq) by mouth daily       * predniSONE 10 MG tablet    DELTASONE    15 tablet    Take 20 mg per day for 5 days and then 10mg per day for 5 days.       * predniSONE 5 MG tablet    DELTASONE    60 tablet    Take 1 tablet (5 mg) by mouth 2 times daily       * Notice:  This list has 2 medication(s) that are the same as other medications prescribed for you. Read the directions carefully, and ask your doctor or other care provider to review them with you.

## 2017-06-01 DIAGNOSIS — M25.50 MULTIPLE JOINT PAIN: ICD-10-CM

## 2017-06-01 NOTE — TELEPHONE ENCOUNTER
Called and spoke with Chepe in [place] regarding using gout protocol for this pt tomorrow am.  Asked if he can please inform the reading radiologist.  He will do so.    Charis Ellis RN  Rheumatology Clinic

## 2017-06-01 NOTE — TELEPHONE ENCOUNTER
Medication Detail      Disp Refills Start End JOJO   predniSONE (DELTASONE) 5 MG tablet 60 tablet 1 5/17/2017  --   Sig: Take 1 tablet (5 mg) by mouth 2 times daily       Last Office Visit with [facility], [facility] or [facility] prescribing provider: 5-9-17  Future Office visit:       Routing refill request to provider for review/approval because:  Drug not on the [facility], [facility] or [facility] refill protocol or controlled substance

## 2017-06-02 ENCOUNTER — OFFICE VISIT (OUTPATIENT)
Dept: RHEUMATOLOGY | Facility: CLINIC | Age: 62
End: 2017-06-02
Attending: STUDENT IN AN ORGANIZED HEALTH CARE EDUCATION/TRAINING PROGRAM
Payer: COMMERCIAL

## 2017-06-02 VITALS
BODY MASS INDEX: 27.09 KG/M2 | HEART RATE: 94 BPM | WEIGHT: 182.9 LBS | SYSTOLIC BLOOD PRESSURE: 142 MMHG | DIASTOLIC BLOOD PRESSURE: 75 MMHG | TEMPERATURE: 98 F | HEIGHT: 69 IN

## 2017-06-02 DIAGNOSIS — M10.9 GOUT, UNSPECIFIED CAUSE, UNSPECIFIED CHRONICITY, UNSPECIFIED SITE: ICD-10-CM

## 2017-06-02 PROCEDURE — 99212 OFFICE O/P EST SF 10 MIN: CPT | Mod: ZF

## 2017-06-02 RX ORDER — PREDNISONE 20 MG/1
40 TABLET ORAL DAILY
Qty: 15 TABLET | Refills: 3 | Status: SHIPPED | OUTPATIENT
Start: 2017-06-02 | End: 2017-06-05

## 2017-06-02 RX ORDER — ALLOPURINOL 100 MG/1
200 TABLET ORAL DAILY
Qty: 90 TABLET | Refills: 1 | Status: SHIPPED | OUTPATIENT
Start: 2017-06-02 | End: 2017-08-02 | Stop reason: DRUGHIGH

## 2017-06-02 RX ORDER — PREDNISONE 10 MG/1
10 TABLET ORAL DAILY
Qty: 30 TABLET | Refills: 1 | Status: SHIPPED | OUTPATIENT
Start: 2017-06-02 | End: 2017-07-25

## 2017-06-02 ASSESSMENT — PAIN SCALES - GENERAL: PAINLEVEL: MILD PAIN (3)

## 2017-06-02 NOTE — MR AVS SNAPSHOT
After Visit Summary   6/2/2017    Murray Nicholson    MRN: [id number]           Patient Information     Date Of Birth          1955        Visit Information        Provider Department      6/2/2017 10:00 AM Darvin Tang MD [facility] Rheumatology        Today's Diagnoses     Gout, unspecified cause, unspecified chronicity, unspecified site          Care Instructions    Increase allopurinol 150 mg daily for 2 weeks, then 200 mg daily for 2 weeks    Lab check in 1 month, I will call you to adjust medications further after that.          Follow-ups after your visit        Follow-up notes from your care team     Return in about 2 months (around 8/2/2017).      Your next 10 appointments already scheduled     Jun 21, 2017 10:30 AM CDT   Lab with  LAB    Health Lab ([facility])    [address] [place] [address]   [phone]            Jun 21, 2017 11:30 AM CDT   (Arrive by 11:00 AM)   Return Visit with Brice Caraballo MD   [facility] Nephrology ([facility])    [address] [place] [address]   [phone]            Jun 22, 2017   Procedure with Esteban Arvizu MD   [facility], [place], Same Day Surgery (--)    [address]  [facility] [address]   [phone]            Jul 05, 2017  1:00 PM CDT   (Arrive by 12:45 PM)   Post-Op with VERONICA Torres [facility] Solid Organ Transplant ([facility])    [address] [place] [address]   [phone]            Aug 02, 2017  1:00 PM CDT   Lab with  LAB    Health Lab ([facility])    [address] [place] [address]   [phone]            Aug 02, 2017  2:30 PM CDT   (Arrive by 2:00 PM)   Return Visit with Brice Caraballo MD   [facility] Nephrology ([facility])    [address] [place] [address]  "  [phone]              Future tests that were ordered for you today     Open Future Orders        Priority Expected Expires Ordered    CBC with platelets differential Routine 6/30/2017 6/2/2018 6/2/2017    Comprehensive metabolic panel Routine 6/30/2017 6/2/2018 6/2/2017    Uric acid Routine 6/30/2017 6/2/2018 6/2/2017            Who to contact     If you have questions or need follow up information about today's clinic visit or your schedule please contact [facility] RHEUMATOLOGY directly at [phone].  Normal or non-critical lab and imaging results will be communicated to you by zeenworldhart, letter or phone within 4 business days after the clinic has received the results. If you do not hear from us within 7 days, please contact the clinic through Apptimize or phone. If you have a critical or abnormal lab result, we will notify you by phone as soon as possible.  Submit refill requests through Apptimize or call your pharmacy and they will forward the refill request to us. Please allow 3 business days for your refill to be completed.          Additional Information About Your Visit        Apptimize Information     Apptimize gives you secure access to your electronic health record. If you see a primary care provider, you can also send messages to your care team and make appointments. If you have questions, please call your primary care clinic.  If you do not have a primary care provider, please call [phone] and they will assist you.        Care EveryWhere ID     This is your Care EveryWhere ID. This could be used by other organizations to access your [place] medical records  [id number]        Your Vitals Were     Pulse Temperature Height BMI (Body Mass Index)          94 98  F (36.7  C) (Oral) 1.753 m (5' 9\") 27.01 kg/m2         Blood Pressure from Last 3 Encounters:   06/02/17 142/75   05/25/17 109/67   05/17/17 110/67    Weight from Last 3 Encounters:   06/02/17 83 kg (182 lb 14.4 oz)   05/25/17 82.4 kg (181 lb " 11.2 oz)   05/17/17 82.6 kg (182 lb)                 Today's Medication Changes          These changes are accurate as of: 6/2/17 11:57 AM.  If you have any questions, ask your nurse or doctor.               These medicines have changed or have updated prescriptions.        Dose/Directions    allopurinol 100 MG tablet   Commonly known as:  ZYLOPRIM   This may have changed:    - how much to take  - additional instructions   Used for:  Gout, unspecified cause, unspecified chronicity, unspecified site   Changed by:  Darvin Tang MD        Dose:  200 mg   Take 2 tablets (200 mg) by mouth daily Start with 150 mg daily for 2 weeks then go to 200 mg daily.   Quantity:  90 tablet   Refills:  1       * predniSONE 10 MG tablet   Commonly known as:  DELTASONE   This may have changed:    - how much to take  - how to take this  - when to take this  - additional instructions   Used for:  Gout, unspecified cause, unspecified chronicity, unspecified site   Changed by:  Darvin Tang MD        Dose:  10 mg   Take 1 tablet (10 mg) by mouth daily   Quantity:  30 tablet   Refills:  1       * predniSONE 20 MG tablet   Commonly known as:  DELTASONE   This may have changed:    - medication strength  - how much to take  - when to take this   Used for:  Gout, unspecified cause, unspecified chronicity, unspecified site   Changed by:  Darvin Tang MD        Dose:  40 mg   Take 2 tablets (40 mg) by mouth daily for 3 days   Quantity:  15 tablet   Refills:  3       * Notice:  This list has 2 medication(s) that are the same as other medications prescribed for you. Read the directions carefully, and ask your doctor or other care provider to review them with you.         Where to get your medicines      These medications were sent to [id number] - [place], [place] - [address], [place] [address]     Phone:  [phone]     allopurinol 100 MG tablet    predniSONE 10 MG  tablet    predniSONE 20 MG tablet                Primary Care Provider Office Phone # Fax #    Yahir Turcios [phone]       [place] [address]  [facility] [address]        Thank you!     Thank you for choosing [facility] RHEUMATOLOGY  for your care. Our goal is always to provide you with excellent care. Hearing back from our patients is one way we can continue to improve our services. Please take a few minutes to complete the written survey that you may receive in the mail after your visit with us. Thank you!             Your Updated Medication List - Protect others around you: Learn how to safely use, store and throw away your medicines at www.disposemymeds.org.          This list is accurate as of: 6/2/17 11:57 AM.  Always use your most recent med list.                   Brand Name Dispense Instructions for use    acetaminophen 325 MG tablet    TYLENOL    100 tablet    Take 1-2 tablets (325-650 mg) by mouth every 4 hours as needed for mild pain Do not take more than 10 tablets in 24 hours       albuterol 108 (90 BASE) MCG/ACT Inhaler    PROAIR HFA/PROVENTIL HFA/VENTOLIN HFA    1 Inhaler    Inhale 2 puffs into the lungs every 6 hours as needed for shortness of breath / dyspnea or wheezing       allopurinol 100 MG tablet    ZYLOPRIM    90 tablet    Take 2 tablets (200 mg) by mouth daily Start with 150 mg daily for 2 weeks then go to 200 mg daily.       aspirin 81 MG tablet      Take 1 tablet (81 mg) by mouth at bedtime       atorvastatin 40 MG tablet    LIPITOR    90 tablet    Take 1 tablet (40 mg) by mouth daily       calcitRIOL 0.25 MCG capsule    ROCALTROL    90 capsule    Take 1 capsule (0.25 mcg) by mouth daily       darbepoetin emily 100 MCG/0.5ML injection    ARANESP (ALBUMIN FREE)    0.5 mL    Inject 0.5 mLs (100 mcg) Subcutaneous every 14 days As needed for hgb<10g/dL.  If Hgb increases >1 point in 2 weeks (if blood transfusion given, use hgb PRIOR to this), SYSTOLIC BP >  180 mmHg or hgb>=10g/dL, HOLD DOSE. Dose must be within 1 week of Hgb.  Per anemia protocol with Brice Caraballo MD       docusate sodium 100 MG capsule    COLACE    60 capsule    Take 1 capsule (100 mg) by mouth 2 times daily       furosemide 80 MG tablet    LASIX    120 tablet    Take 1 tablet (80 mg) by mouth 2 times daily In the morning and at 3 PM       glipiZIDE 5 MG tablet    GLUCOTROL    90 tablet    Take 1 tablet by mouth. TAKE 1 TABLET BY MOUTH DAILY BEFORE A MEAL       hydrALAZINE 50 MG tablet    APRESOLINE    270 tablet    Take 1 tablet (50 mg) by mouth 3 times daily       HYDROcodone-acetaminophen 5-325 MG per tablet    NORCO    15 tablet    Take 1 tablet by mouth every 4 hours as needed for pain       isosorbide mononitrate 60 MG 24 hr tablet    IMDUR    90 tablet    Take 1 tablet (60 mg) by mouth daily       loratadine 10 MG tablet    CLARITIN     Take 10 mg by mouth daily as needed Reported on 5/3/2017       losartan 100 MG tablet    COZAAR    90 tablet    Take 1 tablet (100 mg) by mouth daily       metolazone 2.5 MG tablet    ZAROXOLYN    90 tablet    Take 1 tablet (2.5 mg) by mouth daily       metoprolol 100 MG 24 hr tablet    TOPROL XL    90 tablet    Take 1 tablet (100 mg) by mouth daily       omeprazole 20 MG CR capsule    priLOSEC     Take 20 mg by mouth daily       potassium chloride SA 20 MEQ CR tablet    K-DUR/KLOR-CON M    90 tablet    Take 2 tablets (40 mEq) by mouth daily       * predniSONE 10 MG tablet    DELTASONE    30 tablet    Take 1 tablet (10 mg) by mouth daily       * predniSONE 20 MG tablet    DELTASONE    15 tablet    Take 2 tablets (40 mg) by mouth daily for 3 days       * Notice:  This list has 2 medication(s) that are the same as other medications prescribed for you. Read the directions carefully, and ask your doctor or other care provider to review them with you.

## 2017-06-02 NOTE — TELEPHONE ENCOUNTER
Routing refill request to provider for review/approval because:  Drug not on the [facility] refill protocol     Dr. Turcios-Please sign if agree.    Thank you!  REMI CordonN, RN

## 2017-06-02 NOTE — LETTER
6/2/2017       RE: Murray Nicholson  [address]  [place] [address]     Dear Colleague,    Thank you for referring your patient, Murray Nicholson, to the [facility] RHEUMATOLOGY at [facility]. Please see a copy of my visit note below.    Rheumatology Clinic Visit     Murray Nicholson MRN# [id number]   YOB: 1955 Age: 62 year old     Date of Visit: 06/02/2017  Primary care provider: Yahir Turcios          Assessment and Plan:   #Polyarticular gout, recurrent and involving the hips with dual-energy CT showing MSU deposition bilaterally  #Hyperuricemia  #CKD stage V, peritoneal dialysis pending  #DM II    -increase allopurinol to 150 mg daily for next 2 weeks, then 200 mg daily for 2 weeks  -labs in 4 weeks, will call to titrate allopurinol further if labs OK, uric acid goal is < 6.0  -prednisone 10 mg daily for flare prophylaxis  -prednisone 40 mg daily for 3 days for gout flare    Seen and discussed with Dr. Clemons.    Darvin Tang MD  Rheumatology Fellow  [phone]        I saw this patient with the Rheumatology Fellow. I agree with the findings and recommendations.  Probable polyarticular gout in the setting of near ESRD. Allopurinol can be increased by 50 mg increments while monitoring for bone marrow toxicity. Low threshold to substitute febuxostat as primary uric acid lowering therapy UAL, markedly impaired GFR.    Markell Clemons M.D.  Staff Rheumatologist, [facility]  Pager [phone]          Active Problem List:     Patient Active Problem List    Diagnosis Date Noted     Fluid overload 04/08/2017     Priority: Medium     Anemia in stage 5 chronic kidney disease (H) 03/17/2017     Priority: Medium     Anemia, iron deficiency 02/15/2017     Priority: Medium     Type 2 diabetes mellitus with diabetic chronic kidney disease (H) 10/14/2015     Priority: Medium     Hypertension      Priority: Medium     Dyslipidemia      Priority: Medium     MGUS  (monoclonal gammopathy of unknown significance)      Priority: Medium     Ascending aortic aneurysm (H)      Priority: Medium     Anemia in chronic renal disease 05/19/2015     Priority: Medium     updating diagnosis code for icd10 cutover       CAD (coronary artery disease) 07/10/2014     Priority: Medium     (HFpEF) heart failure with preserved ejection fraction (H)      Bicuspid aortic valve      Anemia of chronic disease 04/12/2013     Problem list name updated by automated process. Provider to review and confirm       Tubular adenoma 12/30/2011     Hypertensive cardiopathy 08/24/2011     Hypertension goal BP (blood pressure) < 130/80 01/25/2011     Hyperlipidemia LDL goal <100 10/31/2010     Per provider       CKD (chronic kidney disease) stage 5, GFR less than 15 ml/min (H) 02/09/2010     CHF (congestive heart failure) (H) 08/20/2008     Allergic rhinitis 04/05/2006     Problem list name updated by automated process. Provider to review       Hypersomnia with sleep apnea 08/18/2005     Problem list name updated by automated process. Provider to review       Esophageal reflux 08/12/2004          History of Present Illness:   Murray Nicholson is a 62 year old male with history of CAD and ischemic cardiomyopathy, CKD V planning on starting peritoneal dialysis next month, and DM II who presents for evaluation of gout. He was seen initially in April of 2017 while in the hospital by Dr. Gaston. He had polyarticular gout at that time, however he also had diffuse low back pain radiating into the groin and an MRI of the lumbar spine showed inflammation of the soft tissues and muscles of the right hip. Subsequent hip MRI showed cystic changes in both hips. His uric acid was checked and was 17.9. He was diagnosed with gout clinically because he declined an aspiration. He was started on allopurinol and prednisone with prompt resolution of his symptoms. His CK was normal. He also reported a 1.5-2.0 year history of intermittent  episodes of podagra and acute gout of his ankles. He has not had a kidney biopsy.     He denies pain in his hands, elbows, or shoulders. He also denies lumps, bumps, or deposits consistent with tophi. He does not have morning stiffness. Since being seen last he has been taking allopurinol 100 mg daily without trouble. He takes 10 mg of prednisone twice per day but often bumps it up on his own if he feels a flare is impending. He is still working actively at the [employer] and thus becomes quite incapacitated by gout.         Review of Systems (other than in HPI):   Constitutional: negative  Skin: negative  Eyes: negative  Ears/Nose/Throat: negative  Respiratory: negative  Cardiovascular: negative  Gastrointestinal: negative  Genitourinary: negative  Musculoskeletal: negative  Neurologic: negative  Psychiatric: negative  Hematologic/Lymphatic/Immunologic: negative  Endocrine: negative          Past Medical History:     Past Medical History:   Diagnosis Date     (HFpEF) heart failure with preserved ejection fraction (H)      Allergic rhinitis, cause unspecified      Anemia of chronic kidney failure      Ascending aortic aneurysm (H)      Bicuspid aortic valve      CAD (coronary artery disease)      Chronic kidney disease, stage 5 (H)      Congestive heart failure, unspecified      Dyslipidemia      Esophageal reflux      Hypersomnia with sleep apnea, unspecified      Hypertension      MGUS (monoclonal gammopathy of unknown significance)      SHEELA (obstructive sleep apnea)      Type 2 diabetes mellitus (H)      Past Surgical History:   Procedure Laterality Date     ABDOMEN SURGERY      Hernia     LAPAROSCOPIC HERNIORRHAPHY INGUINAL BILATERAL Bilateral 7/24/2015    Procedure: LAPAROSCOPIC HERNIORRHAPHY INGUINAL BILATERAL;  Surgeon: Bobby Mcconnell MD;  Location:  OR     NO HISTORY OF SURGERY            Social History:     Social History     Occupational History     Not on file.     Social History Main Topics      Smoking status: Former Smoker     Packs/day: 1.00     Years: 19.00     Types: Cigarettes     Quit date: 1994     Smokeless tobacco: Never Used     Alcohol use 0.0 oz/week      Comment: 2 drinks per week if that     Drug use: No      Comment: occassionally     Sexual activity: Not Currently     Partners: Female     Birth control/ protection: Condom          Family History:     Family History   Problem Relation Age of Onset     C.A.D. Father       from-never knew father-age 60     DIABETES Father      CEREBROVASCULAR DISEASE Father      Hypertension No family hx of      Breast Cancer No family hx of      Cancer - colorectal No family hx of      Prostate Cancer No family hx of           Allergies:     Allergies   Allergen Reactions     Cats      Throat tightness     Penicillins Hives     Seasonal Allergies      rhinitis     Shrimp      Throat closes           Medications:     Current Outpatient Prescriptions   Medication Sig Dispense Refill     allopurinol (ZYLOPRIM) 100 MG tablet Take 2 tablets (200 mg) by mouth daily Start with 150 mg daily for 2 weeks then go to 200 mg daily. 90 tablet 1     predniSONE (DELTASONE) 10 MG tablet Take 1 tablet (10 mg) by mouth daily 30 tablet 1     predniSONE (DELTASONE) 20 MG tablet Take 2 tablets (40 mg) by mouth daily for 3 days 15 tablet 3     potassium chloride SA (K-DUR/KLOR-CON M) 20 MEQ CR tablet Take 2 tablets (40 mEq) by mouth daily 90 tablet 0     calcitRIOL (ROCALTROL) 0.25 MCG capsule Take 1 capsule (0.25 mcg) by mouth daily 90 capsule 0     HYDROcodone-acetaminophen (NORCO) 5-325 MG per tablet Take 1 tablet by mouth every 4 hours as needed for pain 15 tablet 0     metolazone (ZAROXOLYN) 2.5 MG tablet Take 1 tablet (2.5 mg) by mouth daily 90 tablet 1     glipiZIDE (GLUCOTROL) 5 MG tablet Take 1 tablet by mouth. TAKE 1 TABLET BY MOUTH DAILY BEFORE A MEAL 90 tablet 0     atorvastatin (LIPITOR) 40 MG tablet Take 1 tablet (40 mg) by mouth daily 90 tablet 3      "isosorbide mononitrate (IMDUR) 60 MG 24 hr tablet Take 1 tablet (60 mg) by mouth daily 90 tablet 3     metoprolol (TOPROL XL) 100 MG 24 hr tablet Take 1 tablet (100 mg) by mouth daily 90 tablet 3     losartan (COZAAR) 100 MG tablet Take 1 tablet (100 mg) by mouth daily 90 tablet 0     hydrALAZINE (APRESOLINE) 50 MG tablet Take 1 tablet (50 mg) by mouth 3 times daily 270 tablet 3     acetaminophen (TYLENOL) 325 MG tablet Take 1-2 tablets (325-650 mg) by mouth every 4 hours as needed for mild pain Do not take more than 10 tablets in 24 hours 100 tablet 0     furosemide (LASIX) 80 MG tablet Take 1 tablet (80 mg) by mouth 2 times daily In the morning and at 3  tablet 0     docusate sodium (COLACE) 100 MG capsule Take 1 capsule (100 mg) by mouth 2 times daily 60 capsule 1     omeprazole (PRILOSEC) 20 MG CR capsule Take 20 mg by mouth daily       darbepoetin emily (ARANESP, ALBUMIN FREE,) 100 MCG/0.5ML injection Inject 0.5 mLs (100 mcg) Subcutaneous every 14 days As needed for hgb<10g/dL.  If Hgb increases >1 point in 2 weeks (if blood transfusion given, use hgb PRIOR to this), SYSTOLIC BP > 180 mmHg or hgb>=10g/dL, HOLD DOSE. Dose must be within 1 week of Hgb.  Per anemia protocol with Brice Caraballo MD 0.5 mL 99     albuterol (PROAIR HFA/PROVENTIL HFA/VENTOLIN HFA) 108 (90 BASE) MCG/ACT Inhaler Inhale 2 puffs into the lungs every 6 hours as needed for shortness of breath / dyspnea or wheezing 1 Inhaler 0     loratadine (CLARITIN) 10 MG tablet Take 10 mg by mouth daily as needed Reported on 5/3/2017       ASPIRIN 81 MG OR TABS Take 1 tablet (81 mg) by mouth at bedtime            Physical Exam:   Blood pressure 142/75, pulse 94, temperature 98  F (36.7  C), temperature source Oral, height 1.753 m (5' 9\"), weight 83 kg (182 lb 14.4 oz).  Wt Readings from Last 4 Encounters:   06/02/17 83 kg (182 lb 14.4 oz)   05/25/17 82.4 kg (181 lb 11.2 oz)   05/17/17 82.6 kg (182 lb)   05/03/17 82.8 kg (182 lb 9.6 oz) "     Constitutional: well-developed, appearing stated age; cooperative  Eyes: normal EOM, PERRLA, vision, conjunctiva, sclera  ENT: normal external ears, nose, hearing, lips, teeth, gums, throat, no mucous membrane lesions, normal saliva pool  Neck: no mass or thyroid enlargement  Resp: lungs clear to auscultation  CV: regular rate and rhythm, no murmurs, rubs or gallops, no edema  GI: no abdominal mass or tenderness, no organomegaly  : not tested  Lymph: no cervical, supraclavicular, or epitrochlear nodes  MS: The TMJ, neck, shoulder, elbow, wrist, MCP/PIP/DIP, spine, hip, knee, ankle, and foot MTP/IP joints were examined. No active synovitis or altered joint anatomy. Full joint ROM. Normal  strength. No dactylitis,  tenosynovitis, enthesopathy. Bilateral 1st MTP tenderness. MTP's 3-5 tender in left foot. Left ankle tender without warmth or effusion. Slight pain with extreme flexion of left hip. No tophi.  Skin: no nail pitting, alopecia, rash, nodules or lesions  Neuro: normal cranial nerves, strength, sensation, DTR's  Psych: normal judgement, orientation, memory, affect.         Data:     Results for orders placed or performed in visit on 06/02/17   CT Hip Bilateral w/o Contrast    Narrative    Dual energy CT of pelvis without contrast    History: Evaluate for uric acid crystals. Gout. Pain in right and left  hip.    Techniques: Helical acquisition of images through the pelvis was  obtained without administration of contrast using dual energy CT with  80 and 140 kV. Postprocessing of the source data images were performed  using [employer].    DLP: 753 mGy-cm    Comparison: April 11, 2017    Findings:    There are punctate foci of uric acid deposition around the right hip,  and single punctate focus around the left hip. Additional punctate  foci of apparent uric acid deposition including in the colonic  diverticula and along the pelvic vasculature are presumably  artifactual. More distinct dense calcification  around the bilateral  hip are also present without evidence of uric acid deposition  presence.    Severe degenerative change of bilateral hips with subcutaneous  chondral cyst with area of subchondral bone plate dehiscence. There is  pneumatocyst in the right acetabulum. Contour radiology at the lateral  aspect of the acetabulum with area of cortical defect (image 34 series  8 for instance). Degenerative changes of pubic symphysis.  Enthesopathic changes of the ischium.    Evaluation soft tissue, particularly internal judgment of joints are  limited with CT technique. Colonic diverticulosis. Atherosclerotic  calcification of the visualized vessels. Muscles are symmetric. There  is approximately 2.7 x 1.8 cm fluid pocket adjacent to the anterior  superior aspect of the right acetabulum, most consistent with  paralabral cysts.       Impression    Impression:  1. Small foci of uric acid deposition around the bilateral hips,  greater on right.  2. Severe bilateral hip osteoarthritis with extensive subchondral  cystic changes and subchondral bone plate and cortical dehiscence of  acetabulum.  3. Apparent fluid pocket adjacent to the right acetabulum, most likely  representing paralabral cysts.         JERAMY HAI     Hemoglobin   Date Value Ref Range Status   05/31/2017 9.9 (L) 13.3 - 17.7 g/dL Final   05/17/2017 9.3 (L) 13.3 - 17.7 g/dL Final   05/03/2017 9.7 (L) 13.3 - 17.7 g/dL Final     Urea Nitrogen   Date Value Ref Range Status   05/31/2017 146 (H) 7 - 30 mg/dL Final   05/17/2017 163 (H) 7 - 30 mg/dL Final   05/03/2017 173 (H) 7 - 30 mg/dL Final     Creatinine   Date Value Ref Range Status   06/03/2013 2.1 (H) 0.66 - 1.25 mg/dL Final   05/17/2010 1.8 (H) 0.66 - 1.25 mg/dL Final     Comment:     New IDMS-traceable calibration  beginning 12/17/08     Sed Rate   Date Value Ref Range Status   01/13/2016 80 (H) 0 - 20 mm/h Final     CRP Inflammation   Date Value Ref Range Status   05/31/2017 15.9 (H) 0.0 - 8.0  mg/L Final   05/17/2017 39.3 (H) 0.0 - 8.0 mg/L Final   04/13/2017 270.0 (H) 0.0 - 8.0 mg/L Final     AST   Date Value Ref Range Status   05/31/2017 18 0 - 45 U/L Final   05/17/2017 16 0 - 45 U/L Final   04/28/2017 11 0 - 45 U/L Final     Albumin   Date Value Ref Range Status   05/31/2017 2.7 (L) 3.4 - 5.0 g/dL Final   05/17/2017 2.6 (L) 3.4 - 5.0 g/dL Final   04/28/2017 2.7 (L) 3.4 - 5.0 g/dL Final     Alkaline Phosphatase   Date Value Ref Range Status   05/31/2017 97 40 - 150 U/L Final   05/17/2017 86 40 - 150 U/L Final   04/28/2017 129 40 - 150 U/L Final     ALT   Date Value Ref Range Status   05/31/2017 16 0 - 70 U/L Final   05/17/2017 12 0 - 70 U/L Final   04/28/2017 13 0 - 70 U/L Final     Recent Labs   Lab Test  05/31/17   1111  05/17/17   1214  05/03/17   1339  04/28/17   0842   04/12/17   0935   04/09/15   0900   05/15/13   1418   WBC  8.0  4.9   --   12.0*   < >   --    < >  6.8   < >   --    HGB  9.9*  9.3*  9.7*  9.2*   < >   --    < >  8.8*   < >   --    HCT  31.1*  29.0*  30.6*  28.8*   < >   --    < >  26.2*   < >   --    MCV  92  91   --   93   < >   --    < >  86   < >   --    PLT  302  250   --   263   < >   --    < >  339   < >   --    BUN  146*  163*  173*  161*   < >   --    < >  54*   < >  27   TSH   --    --    --    --    --   1.26   --   3.47   --   2.05   AST  18  16   --   11   < >   --    < >  19   --   33   ALT  16  12   --   13   < >   --    < >  25   --   36   ALKPHOS  97  86   --   129   < >   --    < >  154*   --   100    < > = values in this interval not displayed.     Reviewed Rheumatology lab flowsheet    Again, thank you for allowing me to participate in the care of your patient.      Sincerely,    Darvin Tang MD

## 2017-06-02 NOTE — PATIENT INSTRUCTIONS
Increase allopurinol 150 mg daily for 2 weeks, then 200 mg daily for 2 weeks    Lab check in 1 month, I will call you to adjust medications further after that.

## 2017-06-02 NOTE — PROGRESS NOTES
Rheumatology Clinic Visit     Murray Nicholson MRN# [id number]   YOB: 1955 Age: 62 year old     Date of Visit: 06/02/2017  Primary care provider: Yahir Turcios          Assessment and Plan:   #Polyarticular gout, recurrent and involving the hips with dual-energy CT showing MSU deposition bilaterally  #Hyperuricemia  #CKD stage V, peritoneal dialysis pending  #DM II    -increase allopurinol to 150 mg daily for next 2 weeks, then 200 mg daily for 2 weeks  -labs in 4 weeks, will call to titrate allopurinol further if labs OK, uric acid goal is < 6.0  -prednisone 10 mg daily for flare prophylaxis  -prednisone 40 mg daily for 3 days for gout flare    Seen and discussed with Dr. Clemons.    Darvin Tang MD  Rheumatology Fellow  [phone]        I saw this patient with the Rheumatology Fellow. I agree with the findings and recommendations.  Probable polyarticular gout in the setting of near ESRD. Allopurinol can be increased by 50 mg increments while monitoring for bone marrow toxicity. Low threshold to substitute febuxostat as primary uric acid lowering therapy UAL, markedly impaired GFR.    Markell Clemons M.D.  Staff Rheumatologist, [facility]  Pager [phone]          Active Problem List:     Patient Active Problem List    Diagnosis Date Noted     Fluid overload 04/08/2017     Priority: Medium     Anemia in stage 5 chronic kidney disease (H) 03/17/2017     Priority: Medium     Anemia, iron deficiency 02/15/2017     Priority: Medium     Type 2 diabetes mellitus with diabetic chronic kidney disease (H) 10/14/2015     Priority: Medium     Hypertension      Priority: Medium     Dyslipidemia      Priority: Medium     MGUS (monoclonal gammopathy of unknown significance)      Priority: Medium     Ascending aortic aneurysm (H)      Priority: Medium     Anemia in chronic renal disease 05/19/2015     Priority: Medium     updating diagnosis code for icd10 cutover       CAD (coronary artery disease)  07/10/2014     Priority: Medium     (HFpEF) heart failure with preserved ejection fraction (H)      Bicuspid aortic valve      Anemia of chronic disease 04/12/2013     Problem list name updated by automated process. Provider to review and confirm       Tubular adenoma 12/30/2011     Hypertensive cardiopathy 08/24/2011     Hypertension goal BP (blood pressure) < 130/80 01/25/2011     Hyperlipidemia LDL goal <100 10/31/2010     Per provider       CKD (chronic kidney disease) stage 5, GFR less than 15 ml/min (H) 02/09/2010     CHF (congestive heart failure) (H) 08/20/2008     Allergic rhinitis 04/05/2006     Problem list name updated by automated process. Provider to review       Hypersomnia with sleep apnea 08/18/2005     Problem list name updated by automated process. Provider to review       Esophageal reflux 08/12/2004          History of Present Illness:   Murray Nicholson is a 62 year old male with history of CAD and ischemic cardiomyopathy, CKD V planning on starting peritoneal dialysis next month, and DM II who presents for evaluation of gout. He was seen initially in April of 2017 while in the hospital by Dr. Gaston. He had polyarticular gout at that time, however he also had diffuse low back pain radiating into the groin and an MRI of the lumbar spine showed inflammation of the soft tissues and muscles of the right hip. Subsequent hip MRI showed cystic changes in both hips. His uric acid was checked and was 17.9. He was diagnosed with gout clinically because he declined an aspiration. He was started on allopurinol and prednisone with prompt resolution of his symptoms. His CK was normal. He also reported a 1.5-2.0 year history of intermittent episodes of podagra and acute gout of his ankles. He has not had a kidney biopsy.     He denies pain in his hands, elbows, or shoulders. He also denies lumps, bumps, or deposits consistent with tophi. He does not have morning stiffness. Since being seen last he has been  taking allopurinol 100 mg daily without trouble. He takes 10 mg of prednisone twice per day but often bumps it up on his own if he feels a flare is impending. He is still working actively at the [employer] and thus becomes quite incapacitated by gout.         Review of Systems (other than in HPI):   Constitutional: negative  Skin: negative  Eyes: negative  Ears/Nose/Throat: negative  Respiratory: negative  Cardiovascular: negative  Gastrointestinal: negative  Genitourinary: negative  Musculoskeletal: negative  Neurologic: negative  Psychiatric: negative  Hematologic/Lymphatic/Immunologic: negative  Endocrine: negative          Past Medical History:     Past Medical History:   Diagnosis Date     (HFpEF) heart failure with preserved ejection fraction (H)      Allergic rhinitis, cause unspecified      Anemia of chronic kidney failure      Ascending aortic aneurysm (H)      Bicuspid aortic valve      CAD (coronary artery disease)      Chronic kidney disease, stage 5 (H)      Congestive heart failure, unspecified      Dyslipidemia      Esophageal reflux      Hypersomnia with sleep apnea, unspecified      Hypertension      MGUS (monoclonal gammopathy of unknown significance)      SHEELA (obstructive sleep apnea)      Type 2 diabetes mellitus (H)      Past Surgical History:   Procedure Laterality Date     ABDOMEN SURGERY      Hernia     LAPAROSCOPIC HERNIORRHAPHY INGUINAL BILATERAL Bilateral 7/24/2015    Procedure: LAPAROSCOPIC HERNIORRHAPHY INGUINAL BILATERAL;  Surgeon: Bobby Mcconnell MD;  Location: [facility]     NO HISTORY OF SURGERY            Social History:     Social History     Occupational History     Not on file.     Social History Main Topics     Smoking status: Former Smoker     Packs/day: 1.00     Years: 19.00     Types: Cigarettes     Quit date: 8/18/1994     Smokeless tobacco: Never Used     Alcohol use 0.0 oz/week      Comment: 2 drinks per week if that     Drug use: No      Comment: occassionally      Sexual activity: Not Currently     Partners: Female     Birth control/ protection: Condom          Family History:     Family History   Problem Relation Age of Onset     C.A.D. Father       from-never knew father-age 60     DIABETES Father      CEREBROVASCULAR DISEASE Father      Hypertension No family hx of      Breast Cancer No family hx of      Cancer - colorectal No family hx of      Prostate Cancer No family hx of           Allergies:     Allergies   Allergen Reactions     Cats      Throat tightness     Penicillins Hives     Seasonal Allergies      rhinitis     Shrimp      Throat closes           Medications:     Current Outpatient Prescriptions   Medication Sig Dispense Refill     allopurinol (ZYLOPRIM) 100 MG tablet Take 2 tablets (200 mg) by mouth daily Start with 150 mg daily for 2 weeks then go to 200 mg daily. 90 tablet 1     predniSONE (DELTASONE) 10 MG tablet Take 1 tablet (10 mg) by mouth daily 30 tablet 1     predniSONE (DELTASONE) 20 MG tablet Take 2 tablets (40 mg) by mouth daily for 3 days 15 tablet 3     potassium chloride SA (K-DUR/KLOR-CON M) 20 MEQ CR tablet Take 2 tablets (40 mEq) by mouth daily 90 tablet 0     calcitRIOL (ROCALTROL) 0.25 MCG capsule Take 1 capsule (0.25 mcg) by mouth daily 90 capsule 0     HYDROcodone-acetaminophen (NORCO) 5-325 MG per tablet Take 1 tablet by mouth every 4 hours as needed for pain 15 tablet 0     metolazone (ZAROXOLYN) 2.5 MG tablet Take 1 tablet (2.5 mg) by mouth daily 90 tablet 1     glipiZIDE (GLUCOTROL) 5 MG tablet Take 1 tablet by mouth. TAKE 1 TABLET BY MOUTH DAILY BEFORE A MEAL 90 tablet 0     atorvastatin (LIPITOR) 40 MG tablet Take 1 tablet (40 mg) by mouth daily 90 tablet 3     isosorbide mononitrate (IMDUR) 60 MG 24 hr tablet Take 1 tablet (60 mg) by mouth daily 90 tablet 3     metoprolol (TOPROL XL) 100 MG 24 hr tablet Take 1 tablet (100 mg) by mouth daily 90 tablet 3     losartan (COZAAR) 100 MG tablet Take 1 tablet (100 mg) by mouth daily 90  "tablet 0     hydrALAZINE (APRESOLINE) 50 MG tablet Take 1 tablet (50 mg) by mouth 3 times daily 270 tablet 3     acetaminophen (TYLENOL) 325 MG tablet Take 1-2 tablets (325-650 mg) by mouth every 4 hours as needed for mild pain Do not take more than 10 tablets in 24 hours 100 tablet 0     furosemide (LASIX) 80 MG tablet Take 1 tablet (80 mg) by mouth 2 times daily In the morning and at 3  tablet 0     docusate sodium (COLACE) 100 MG capsule Take 1 capsule (100 mg) by mouth 2 times daily 60 capsule 1     omeprazole (PRILOSEC) 20 MG CR capsule Take 20 mg by mouth daily       darbepoetin emily (ARANESP, ALBUMIN FREE,) 100 MCG/0.5ML injection Inject 0.5 mLs (100 mcg) Subcutaneous every 14 days As needed for hgb<10g/dL.  If Hgb increases >1 point in 2 weeks (if blood transfusion given, use hgb PRIOR to this), SYSTOLIC BP > 180 mmHg or hgb>=10g/dL, HOLD DOSE. Dose must be within 1 week of Hgb.  Per anemia protocol with Brice Caraballo MD 0.5 mL 99     albuterol (PROAIR HFA/PROVENTIL HFA/VENTOLIN HFA) 108 (90 BASE) MCG/ACT Inhaler Inhale 2 puffs into the lungs every 6 hours as needed for shortness of breath / dyspnea or wheezing 1 Inhaler 0     loratadine (CLARITIN) 10 MG tablet Take 10 mg by mouth daily as needed Reported on 5/3/2017       ASPIRIN 81 MG OR TABS Take 1 tablet (81 mg) by mouth at bedtime            Physical Exam:   Blood pressure 142/75, pulse 94, temperature 98  F (36.7  C), temperature source Oral, height 1.753 m (5' 9\"), weight 83 kg (182 lb 14.4 oz).  Wt Readings from Last 4 Encounters:   06/02/17 83 kg (182 lb 14.4 oz)   05/25/17 82.4 kg (181 lb 11.2 oz)   05/17/17 82.6 kg (182 lb)   05/03/17 82.8 kg (182 lb 9.6 oz)     Constitutional: well-developed, appearing stated age; cooperative  Eyes: normal EOM, PERRLA, vision, conjunctiva, sclera  ENT: normal external ears, nose, hearing, lips, teeth, gums, throat, no mucous membrane lesions, normal saliva pool  Neck: no mass or thyroid enlargement  Resp: " lungs clear to auscultation  CV: regular rate and rhythm, no murmurs, rubs or gallops, no edema  GI: no abdominal mass or tenderness, no organomegaly  : not tested  Lymph: no cervical, supraclavicular, or epitrochlear nodes  MS: The TMJ, neck, shoulder, elbow, wrist, MCP/PIP/DIP, spine, hip, knee, ankle, and foot MTP/IP joints were examined. No active synovitis or altered joint anatomy. Full joint ROM. Normal  strength. No dactylitis,  tenosynovitis, enthesopathy. Bilateral 1st MTP tenderness. MTP's 3-5 tender in left foot. Left ankle tender without warmth or effusion. Slight pain with extreme flexion of left hip. No tophi.  Skin: no nail pitting, alopecia, rash, nodules or lesions  Neuro: normal cranial nerves, strength, sensation, DTR's  Psych: normal judgement, orientation, memory, affect.         Data:     Results for orders placed or performed in visit on 06/02/17   CT Hip Bilateral w/o Contrast    Narrative    Dual energy CT of pelvis without contrast    History: Evaluate for uric acid crystals. Gout. Pain in right and left  hip.    Techniques: Helical acquisition of images through the pelvis was  obtained without administration of contrast using dual energy CT with  80 and 140 kV. Postprocessing of the source data images were performed  using [employer].    DLP: 753 mGy-cm    Comparison: April 11, 2017    Findings:    There are punctate foci of uric acid deposition around the right hip,  and single punctate focus around the left hip. Additional punctate  foci of apparent uric acid deposition including in the colonic  diverticula and along the pelvic vasculature are presumably  artifactual. More distinct dense calcification around the bilateral  hip are also present without evidence of uric acid deposition  presence.    Severe degenerative change of bilateral hips with subcutaneous  chondral cyst with area of subchondral bone plate dehiscence. There is  pneumatocyst in the right acetabulum. Contour  radiology at the lateral  aspect of the acetabulum with area of cortical defect (image 34 series  8 for instance). Degenerative changes of pubic symphysis.  Enthesopathic changes of the ischium.    Evaluation soft tissue, particularly internal judgment of joints are  limited with CT technique. Colonic diverticulosis. Atherosclerotic  calcification of the visualized vessels. Muscles are symmetric. There  is approximately 2.7 x 1.8 cm fluid pocket adjacent to the anterior  superior aspect of the right acetabulum, most consistent with  paralabral cysts.       Impression    Impression:  1. Small foci of uric acid deposition around the bilateral hips,  greater on right.  2. Severe bilateral hip osteoarthritis with extensive subchondral  cystic changes and subchondral bone plate and cortical dehiscence of  acetabulum.  3. Apparent fluid pocket adjacent to the right acetabulum, most likely  representing paralabral cysts.         JERAMY VALLES     Hemoglobin   Date Value Ref Range Status   05/31/2017 9.9 (L) 13.3 - 17.7 g/dL Final   05/17/2017 9.3 (L) 13.3 - 17.7 g/dL Final   05/03/2017 9.7 (L) 13.3 - 17.7 g/dL Final     Urea Nitrogen   Date Value Ref Range Status   05/31/2017 146 (H) 7 - 30 mg/dL Final   05/17/2017 163 (H) 7 - 30 mg/dL Final   05/03/2017 173 (H) 7 - 30 mg/dL Final     Creatinine   Date Value Ref Range Status   06/03/2013 2.1 (H) 0.66 - 1.25 mg/dL Final   05/17/2010 1.8 (H) 0.66 - 1.25 mg/dL Final     Comment:     New IDMS-traceable calibration  beginning 12/17/08     Sed Rate   Date Value Ref Range Status   01/13/2016 80 (H) 0 - 20 mm/h Final     CRP Inflammation   Date Value Ref Range Status   05/31/2017 15.9 (H) 0.0 - 8.0 mg/L Final   05/17/2017 39.3 (H) 0.0 - 8.0 mg/L Final   04/13/2017 270.0 (H) 0.0 - 8.0 mg/L Final     AST   Date Value Ref Range Status   05/31/2017 18 0 - 45 U/L Final   05/17/2017 16 0 - 45 U/L Final   04/28/2017 11 0 - 45 U/L Final     Albumin   Date Value Ref Range Status    05/31/2017 2.7 (L) 3.4 - 5.0 g/dL Final   05/17/2017 2.6 (L) 3.4 - 5.0 g/dL Final   04/28/2017 2.7 (L) 3.4 - 5.0 g/dL Final     Alkaline Phosphatase   Date Value Ref Range Status   05/31/2017 97 40 - 150 U/L Final   05/17/2017 86 40 - 150 U/L Final   04/28/2017 129 40 - 150 U/L Final     ALT   Date Value Ref Range Status   05/31/2017 16 0 - 70 U/L Final   05/17/2017 12 0 - 70 U/L Final   04/28/2017 13 0 - 70 U/L Final     Recent Labs   Lab Test  05/31/17   1111  05/17/17   1214  05/03/17   1339  04/28/17   0842   04/12/17   0935   04/09/15   0900   05/15/13   1418   WBC  8.0  4.9   --   12.0*   < >   --    < >  6.8   < >   --    HGB  9.9*  9.3*  9.7*  9.2*   < >   --    < >  8.8*   < >   --    HCT  31.1*  29.0*  30.6*  28.8*   < >   --    < >  26.2*   < >   --    MCV  92  91   --   93   < >   --    < >  86   < >   --    PLT  302  250   --   263   < >   --    < >  339   < >   --    BUN  146*  163*  173*  161*   < >   --    < >  54*   < >  27   TSH   --    --    --    --    --   1.26   --   3.47   --   2.05   AST  18  16   --   11   < >   --    < >  19   --   33   ALT  16  12   --   13   < >   --    < >  25   --   36   ALKPHOS  97  86   --   129   < >   --    < >  154*   --   100    < > = values in this interval not displayed.     Reviewed Rheumatology lab flowsheet

## 2017-06-02 NOTE — NURSING NOTE
"Chief Complaint   Patient presents with     New Patient     Follow up (was Des's patient)       Initial /75  Pulse 94  Temp 98  F (36.7  C) (Oral)  Ht 1.753 m (5' 9\")  Wt 83 kg (182 lb 14.4 oz)  BMI 27.01 kg/m2 Estimated body mass index is 27.01 kg/(m^2) as calculated from the following:    Height as of this encounter: 1.753 m (5' 9\").    Weight as of this encounter: 83 kg (182 lb 14.4 oz).  Medication Reconciliation: complete  "

## 2017-06-05 ENCOUNTER — TELEPHONE (OUTPATIENT)
Dept: TRANSPLANT | Facility: CLINIC | Age: 62
End: 2017-06-05

## 2017-06-05 ENCOUNTER — CARE COORDINATION (OUTPATIENT)
Dept: NEPHROLOGY | Facility: CLINIC | Age: 62
End: 2017-06-05

## 2017-06-05 DIAGNOSIS — N18.5 CKD (CHRONIC KIDNEY DISEASE) STAGE 5, GFR LESS THAN 15 ML/MIN (H): Primary | ICD-10-CM

## 2017-06-05 NOTE — PROGRESS NOTES
Discussed case with Morgan Arredondo (RN CC), and Kermit. Dr. Mcpherson will accept patient at [facility] once he starts dialysis, catheter placement scheduled for 6/22/17.     Began admission paperwork for patient. Advised labs are needed for this process, he agreed to plan. He is requesting further information on PD and catheter placement (recovery time, etc). Will call PD unit to discuss further.    Carla Kyle RN

## 2017-06-05 NOTE — TELEPHONE ENCOUNTER
Returned patient's call about his same day pd cath procedure on 6/22/2017. Left message informing patient to check in at the [facility] at 11:00 and that the surgical staff nurse would call him 2-3 days prior to 6/22 with instructions. Encouraged patient to call with any questions or concerns.

## 2017-06-05 NOTE — TELEPHONE ENCOUNTER
I don't know why this is needed. Gout? I think he just saw rheum. Did they want him to continue this? Did they prescribe it? Maybe they did.     Yahir Turcios

## 2017-06-06 ENCOUNTER — TRANSFERRED RECORDS (OUTPATIENT)
Dept: HEALTH INFORMATION MANAGEMENT | Facility: CLINIC | Age: 62
End: 2017-06-06

## 2017-06-06 RX ORDER — PREDNISONE 10 MG/1
TABLET ORAL
Qty: 15 TABLET | Refills: 0 | OUTPATIENT
Start: 2017-06-06

## 2017-06-07 DIAGNOSIS — N18.5 CKD (CHRONIC KIDNEY DISEASE) STAGE 5, GFR LESS THAN 15 ML/MIN (H): ICD-10-CM

## 2017-06-07 PROCEDURE — 87340 HEPATITIS B SURFACE AG IA: CPT | Performed by: INTERNAL MEDICINE

## 2017-06-07 PROCEDURE — 36415 COLL VENOUS BLD VENIPUNCTURE: CPT | Performed by: INTERNAL MEDICINE

## 2017-06-07 PROCEDURE — 86706 HEP B SURFACE ANTIBODY: CPT | Performed by: INTERNAL MEDICINE

## 2017-06-07 PROCEDURE — 86704 HEP B CORE ANTIBODY TOTAL: CPT | Performed by: INTERNAL MEDICINE

## 2017-06-07 PROCEDURE — 86480 TB TEST CELL IMMUN MEASURE: CPT | Performed by: INTERNAL MEDICINE

## 2017-06-07 NOTE — PROGRESS NOTES
Called Evelyn through [facility] Admissions and confirmed paperwork was received. She will call to check in on 6/23 to confirm PD catheter was placed. Will fax updated paperwork as able.    Called PD unit, spoke with [facility]Ey. Said they would be happy to connect with patient to provide further education.    Left detailed voicemail updating patient on process and provided PD unit's phone number.    Carla Kyle RN

## 2017-06-08 LAB
HBV CORE AB SERPL QL IA: NONREACTIVE
HBV SURFACE AB SERPL IA-ACNC: 132.49 M[IU]/ML
HBV SURFACE AG SERPL QL IA: NONREACTIVE

## 2017-06-09 LAB
M TB TUBERC IFN-G BLD QL: NEGATIVE
M TB TUBERC IFN-G/MITOGEN IGNF BLD: 0 IU/ML

## 2017-06-14 DIAGNOSIS — N18.5 CKD (CHRONIC KIDNEY DISEASE) STAGE 5, GFR LESS THAN 15 ML/MIN (H): Primary | ICD-10-CM

## 2017-06-14 NOTE — NURSING NOTE
Labs per clinic 2A protocol.  Follow up/CKD 5  Last OV: 5/17/17  Vickie Bryson LPN  Nephrology  [facility]  [phone]

## 2017-06-15 DIAGNOSIS — N18.9 CHRONIC KIDNEY DISEASE, UNSPECIFIED: ICD-10-CM

## 2017-06-15 NOTE — TELEPHONE ENCOUNTER
Medication Detail      Disp Refills Start End JOJO   sodium bicarbonate 650 MG tablet (Discontinued) 180 tablet 0 4/28/2017 5/17/2017 No   Sig: Take 2 tablets (1,300 mg) by mouth 3 times daily       Last Office Visit with [facility] prescribing provider: 5-9-17  Future Office visit:       Routing refill request to provider for review/approval because:  Drug not on the [facility] or [facility] refill protocol or controlled substance

## 2017-06-16 RX ORDER — SODIUM BICARBONATE 650 MG/1
TABLET ORAL
Qty: 180 TABLET | Refills: 0 | Status: SHIPPED | OUTPATIENT
Start: 2017-06-16 | End: 2017-07-29

## 2017-06-21 ENCOUNTER — OFFICE VISIT (OUTPATIENT)
Dept: NEPHROLOGY | Facility: CLINIC | Age: 62
End: 2017-06-21
Attending: INTERNAL MEDICINE
Payer: COMMERCIAL

## 2017-06-21 ENCOUNTER — ANESTHESIA EVENT (OUTPATIENT)
Dept: SURGERY | Facility: CLINIC | Age: 62
End: 2017-06-21
Payer: COMMERCIAL

## 2017-06-21 ENCOUNTER — TELEPHONE (OUTPATIENT)
Dept: PHARMACY | Facility: CLINIC | Age: 62
End: 2017-06-21

## 2017-06-21 ENCOUNTER — HOSPITAL ENCOUNTER (OUTPATIENT)
Facility: CLINIC | Age: 62
Setting detail: SPECIMEN
Discharge: HOME | End: 2017-06-21
Admitting: INTERNAL MEDICINE
Payer: COMMERCIAL

## 2017-06-21 VITALS
TEMPERATURE: 98 F | HEART RATE: 92 BPM | RESPIRATION RATE: 18 BRPM | HEIGHT: 69 IN | WEIGHT: 189.7 LBS | SYSTOLIC BLOOD PRESSURE: 150 MMHG | BODY MASS INDEX: 28.1 KG/M2 | DIASTOLIC BLOOD PRESSURE: 83 MMHG

## 2017-06-21 DIAGNOSIS — N18.5 CKD (CHRONIC KIDNEY DISEASE) STAGE 5, GFR LESS THAN 15 ML/MIN (H): ICD-10-CM

## 2017-06-21 DIAGNOSIS — D63.1 ANEMIA IN STAGE 5 CHRONIC KIDNEY DISEASE (H): ICD-10-CM

## 2017-06-21 DIAGNOSIS — N18.5 ANEMIA IN STAGE 5 CHRONIC KIDNEY DISEASE (H): ICD-10-CM

## 2017-06-21 DIAGNOSIS — D63.1 ANEMIA IN STAGE 5 CHRONIC KIDNEY DISEASE (H): Primary | ICD-10-CM

## 2017-06-21 DIAGNOSIS — N18.5 ANEMIA IN STAGE 5 CHRONIC KIDNEY DISEASE (H): Primary | ICD-10-CM

## 2017-06-21 LAB
ALBUMIN SERPL-MCNC: 2.9 G/DL (ref 3.4–5)
ANION GAP SERPL CALCULATED.3IONS-SCNC: 12 MMOL/L (ref 3–14)
BUN SERPL-MCNC: 140 MG/DL (ref 7–30)
CALCIUM SERPL-MCNC: 8.4 MG/DL (ref 8.5–10.1)
CHLORIDE SERPL-SCNC: 106 MMOL/L (ref 94–109)
CO2 SERPL-SCNC: 21 MMOL/L (ref 20–32)
CREAT SERPL-MCNC: 5.28 MG/DL (ref 0.66–1.25)
ERYTHROCYTE [DISTWIDTH] IN BLOOD BY AUTOMATED COUNT: 16.4 % (ref 10–15)
FERRITIN SERPL-MCNC: 557 NG/ML (ref 26–388)
GFR SERPL CREATININE-BSD FRML MDRD: 11 ML/MIN/1.7M2
GLUCOSE SERPL-MCNC: 140 MG/DL (ref 70–99)
HCT VFR BLD AUTO: 29.5 % (ref 40–53)
HGB BLD-MCNC: 9.8 G/DL (ref 13.3–17.7)
IRON SATN MFR SERPL: 29 % (ref 15–46)
IRON SERPL-MCNC: 69 UG/DL (ref 35–180)
MCH RBC QN AUTO: 30.5 PG (ref 26.5–33)
MCHC RBC AUTO-ENTMCNC: 33.2 G/DL (ref 31.5–36.5)
MCV RBC AUTO: 92 FL (ref 78–100)
PHOSPHATE SERPL-MCNC: 5 MG/DL (ref 2.5–4.5)
PLATELET # BLD AUTO: 183 10E9/L (ref 150–450)
POTASSIUM SERPL-SCNC: 3.8 MMOL/L (ref 3.4–5.3)
RBC # BLD AUTO: 3.21 10E12/L (ref 4.4–5.9)
SODIUM SERPL-SCNC: 139 MMOL/L (ref 133–144)
TIBC SERPL-MCNC: 237 UG/DL (ref 240–430)
URATE SERPL-MCNC: 11.8 MG/DL (ref 3.5–7.2)
WBC # BLD AUTO: 6 10E9/L (ref 4–11)

## 2017-06-21 PROCEDURE — 99213 OFFICE O/P EST LOW 20 MIN: CPT | Mod: 25,ZF

## 2017-06-21 PROCEDURE — 25000128 H RX IP 250 OP 636: Mod: ZF | Performed by: INTERNAL MEDICINE

## 2017-06-21 PROCEDURE — 84550 ASSAY OF BLOOD/URIC ACID: CPT | Performed by: INTERNAL MEDICINE

## 2017-06-21 PROCEDURE — 83540 ASSAY OF IRON: CPT | Performed by: INTERNAL MEDICINE

## 2017-06-21 PROCEDURE — 36415 COLL VENOUS BLD VENIPUNCTURE: CPT | Performed by: INTERNAL MEDICINE

## 2017-06-21 PROCEDURE — 80069 RENAL FUNCTION PANEL: CPT | Performed by: INTERNAL MEDICINE

## 2017-06-21 PROCEDURE — 82728 ASSAY OF FERRITIN: CPT | Performed by: INTERNAL MEDICINE

## 2017-06-21 PROCEDURE — 85027 COMPLETE CBC AUTOMATED: CPT | Performed by: INTERNAL MEDICINE

## 2017-06-21 PROCEDURE — 83550 IRON BINDING TEST: CPT | Performed by: INTERNAL MEDICINE

## 2017-06-21 PROCEDURE — 96372 THER/PROPH/DIAG INJ SC/IM: CPT

## 2017-06-21 RX ADMIN — DARBEPOETIN ALFA 100 MCG: 100 INJECTION, SOLUTION INTRAVENOUS; SUBCUTANEOUS at 11:59

## 2017-06-21 ASSESSMENT — PAIN SCALES - GENERAL: PAINLEVEL: NO PAIN (0)

## 2017-06-21 NOTE — NURSING NOTE
"Chief Complaint   Patient presents with     RECHECK     Kidney follow up       Initial /83 (BP Location: Right arm, Patient Position: Chair)  Pulse 92  Temp 98  F (36.7  C) (Oral)  Resp 18  Ht 1.753 m (5' 9\")  Wt 86 kg (189 lb 11.2 oz)  BMI 28.01 kg/m2 Estimated body mass index is 28.01 kg/(m^2) as calculated from the following:    Height as of this encounter: 1.753 m (5' 9\").    Weight as of this encounter: 86 kg (189 lb 11.2 oz).  Medication Reconciliation: complete   RUPAL GE CMA      "

## 2017-06-21 NOTE — TELEPHONE ENCOUNTER
Anemia Management Note  SUBJECTIVE/OBJECTIVE:  Referred by Dr. Brice Caraballo on 2015.  Primary Diagnosis: Anemia in Chronic Kidney Disease (N18.5 D63.1)   Secondary Diagnosis: Chronic Kidney Disease, Stage V (N18.5)  Hgb goal range: 9-10  Epo/Darbo: Aranesp 100 mcg every 2 weeks as needed - in clinic  RX order expires on 18  Iron regimen: Ferrous Sulfate TID - hold 17  Lab orders  on 2017 In Epic  Anemia Latest Ref Rng & Units 2017 2017 2017 5/3/2017 2017 2017 2017   ANASTASIYA Dose - - - - 100 mcg 100 mcg 100 mcg 100 mcg   Hemoglobin 13.3 - 17.7 g/dL 9.1(L) 9.1(L) 9.2(L) 9.7(L) 9.3(L) 9.9(L) 9.8(L)   IV Iron Dose - - - - - - - -   TSAT 15 - 46 % 30 - - - 20 -    Ferritin 26 - 388 ng/mL 1509(H) - - - 806(H) - 557(H)     BP Readings from Last 3 Encounters:   17 150/83   17 142/75   17 109/67     Wt Readings from Last 2 Encounters:   17 189 lb 11.2 oz (86 kg)   17 182 lb 14.4 oz (83 kg)     Verified the Murray is not currently taking any oral iron supplement    ASSESSMENT:  Hgb:at goal - received dose in clinic - recommend continue current regimen  TSat: not at goal (>30%) but ferritin >500ng/mL.  IV iron not indicated at this time per anemia protocol.     PLAN:  Dosed with aranesp and RTC for hgb then aranesp if needed in 2 week(s)    Orders needed to be renewed (for next follow-up date) in [facility]: None    Iron labs due:  17    Plan discussed with:  Murray      Plan provided by:  Lakisha    NEXT FOLLOW-UP DATE:  17    Anemia Management Service  Zelda Rich,EllisD and Rosa Marcial CPhT  Phone: [phone]  Fax: [phone]

## 2017-06-21 NOTE — MR AVS SNAPSHOT
After Visit Summary   6/21/2017    Murray Nicholson    MRN: [id number]           Patient Information     Date Of Birth          1955        Visit Information        Provider Department      6/21/2017 11:30 AM Brice Caraballo MD [facility]        Today's Diagnoses     Anemia in stage 5 chronic kidney disease (H)    -  1    CKD (chronic kidney disease) stage 5, GFR less than 15 ml/min (H)           Follow-ups after your visit        Follow-up notes from your care team     Return in about 2 months (around 8/21/2017).      Your next 10 appointments already scheduled     Jun 22, 2017   Procedure with Esteban Arvizu MD   [facility], [place], Same Day Surgery (--)    [address] [facility] [address]   [phone]            Jul 05, 2017  1:00 PM CDT   (Arrive by 12:45 PM)   Post-Op with VERONICA Torres [facility] ([facility])    [address] [place] [address]  [address] [facility] [place] [id number] [phone]            Aug 02, 2017  1:00 PM CDT   Lab with  LAB   [facility] ([facility])    [address] [facility]  [address] [facility] [place] [address] [phone]            Aug 02, 2017  2:30 PM CDT   (Arrive by 2:00 PM)   Return Visit with Brice Caraballo MD   [facility] ([facility])    [address] [place] [address]  [address] [facility] [place] [id number] [phone]              Who to contact     If you have questions or need follow up information about today's clinic visit or your schedule please contact [facility] directly at [phone].  Normal or non-critical lab and imaging results will be communicated to you by MyChart, letter or phone within 4 business days after the clinic has received the results. If you do not hear from us within 7 days, please contact the clinic through MyChart or phone. If you have a critical or abnormal lab result, we will notify you by phone as soon  "as possible.  Submit refill requests through [employer] or call your pharmacy and they will forward the refill request to us. Please allow 3 business days for your refill to be completed.          Additional Information About Your Visit        [employer] Information     [employer] gives you secure access to your electronic health record. If you see a primary care provider, you can also send messages to your care team and make appointments. If you have questions, please call your primary care clinic.  If you do not have a primary care provider, please call [phone] and they will assist you.        Care EveryWhere ID     This is your Care EveryWhere ID. This could be used by other organizations to access your [place] medical records  [id number]        Your Vitals Were     Pulse Temperature Respirations Height BMI (Body Mass Index)       92 98  F (36.7  C) (Oral) 18 1.753 m (5' 9\") 28.01 kg/m2        Blood Pressure from Last 3 Encounters:   06/21/17 150/83   06/02/17 142/75   05/25/17 109/67    Weight from Last 3 Encounters:   06/21/17 86 kg (189 lb 11.2 oz)   06/02/17 83 kg (182 lb 14.4 oz)   05/25/17 82.4 kg (181 lb 11.2 oz)              Today, you had the following     No orders found for display       Primary Care Provider Office Phone # Fax #    Yahir Turcios [phone]       [place] [address]  [facility] [address]        Equal Access to Services     JOHN HAUSER AH: Hadii marsha ku hadasho Soomaali, waaxda luqadaha, qaybta kaalmada adeegyada, jose ayala. So [facility] [phone].    ATENCIÓN: Si habla español, tiene a ortzi disposición servicios gratuitos de asistencia lingüística. Llnova al [phone].    We comply with applicable federal civil rights laws and [place] laws. We do not discriminate on the basis of race, color, national origin, age, disability sex, sexual orientation or gender identity.            Thank you!     Thank you for choosing [facility]  for your care. Our goal is always to provide you with excellent care. Hearing back from our patients is one way we can continue to improve our services. Please take a few minutes to complete the written survey that you may receive in the mail after your visit with us. Thank you!             Your Updated Medication List - Protect others around you: Learn how to safely use, store and throw away your medicines at www.disposemymeds.org.          This list is accurate as of: 6/21/17 12:41 PM.  Always use your most recent med list.                   Brand Name Dispense Instructions for use Diagnosis    acetaminophen 325 MG tablet    TYLENOL    100 tablet    Take 1-2 tablets (325-650 mg) by mouth every 4 hours as needed for mild pain Do not take more than 10 tablets in 24 hours    Gout, unspecified cause, unspecified chronicity, unspecified site       albuterol 108 (90 BASE) MCG/ACT Inhaler    PROAIR HFA/PROVENTIL HFA/VENTOLIN HFA    1 Inhaler    Inhale 2 puffs into the lungs every 6 hours as needed for shortness of breath / dyspnea or wheezing    Cough       allopurinol 100 MG tablet    ZYLOPRIM    90 tablet    Take 2 tablets (200 mg) by mouth daily Start with 150 mg daily for 2 weeks then go to 200 mg daily.    Gout, unspecified cause, unspecified chronicity, unspecified site       aspirin 81 MG tablet      Take 1 tablet (81 mg) by mouth at bedtime        atorvastatin 40 MG tablet    LIPITOR    90 tablet    Take 1 tablet (40 mg) by mouth daily    CKD (chronic kidney disease) stage 3, GFR 30-59 ml/min, Hyperlipidemia LDL goal <100       calcitRIOL 0.25 MCG capsule    ROCALTROL    90 capsule    Take 1 capsule (0.25 mcg) by mouth daily    Hypocalcemia       darbepoetin emily 100 MCG/0.5ML injection    ARANESP (ALBUMIN FREE)    0.5 mL    Inject 0.5 mLs (100 mcg) Subcutaneous every 14 days As needed for hgb<10g/dL.  If Hgb increases >1 point in 2 weeks (if blood transfusion given, use hgb PRIOR to this),  SYSTOLIC BP > 180 mmHg or hgb>=10g/dL, HOLD DOSE. Dose must be within 1 week of Hgb.  Per anemia protocol with Brice Caraballo MD    Anemia in stage 5 chronic kidney disease (H), CKD (chronic kidney disease) stage 5, GFR less than 15 ml/min (H)       docusate sodium 100 MG capsule    COLACE    60 capsule    Take 1 capsule (100 mg) by mouth 2 times daily    Constipation, unspecified constipation type       furosemide 80 MG tablet    LASIX    120 tablet    Take 1 tablet (80 mg) by mouth 2 times daily In the morning and at 3 PM    Congestive heart failure, unspecified congestive heart failure chronicity, unspecified congestive heart failure type (H)       glipiZIDE 5 MG tablet    GLUCOTROL    90 tablet    Take 1 tablet by mouth. TAKE 1 TABLET BY MOUTH DAILY BEFORE A MEAL    Type 2 diabetes mellitus with other specified complication (H)       hydrALAZINE 50 MG tablet    APRESOLINE    270 tablet    Take 1 tablet (50 mg) by mouth 3 times daily    Type 2 diabetes mellitus with stage 5 chronic kidney disease not on chronic dialysis, without long-term current use of insulin (H)       HYDROcodone-acetaminophen 5-325 MG per tablet    NORCO    15 tablet    Take 1 tablet by mouth every 4 hours as needed for pain    Multiple joint pain       isosorbide mononitrate 60 MG 24 hr tablet    IMDUR    90 tablet    Take 1 tablet (60 mg) by mouth daily    Chronic systolic congestive heart failure (H)       loratadine 10 MG tablet    CLARITIN     Take 10 mg by mouth daily as needed Reported on 5/3/2017    Hypertensive cardiopathy, SOB (shortness of breath)       losartan 100 MG tablet    COZAAR    90 tablet    Take 1 tablet (100 mg) by mouth daily    Renal hypertension, stage 1-4 or unspecified chronic kidney disease       metolazone 2.5 MG tablet    ZAROXOLYN    90 tablet    Take 1 tablet (2.5 mg) by mouth daily    Other hypervolemia       metoprolol 100 MG 24 hr tablet    TOPROL XL    90 tablet    Take 1 tablet (100 mg) by mouth daily     Chronic systolic congestive heart failure (H)       omeprazole 20 MG CR capsule    priLOSEC     Take 20 mg by mouth daily        potassium chloride SA 20 MEQ CR tablet    K-DUR/KLOR-CON M    90 tablet    Take 2 tablets (40 mEq) by mouth daily    Combined systolic and diastolic congestive heart failure, unspecified congestive heart failure chronicity (H), Hypervolemia, unspecified hypervolemia type, Hospital discharge follow-up       predniSONE 10 MG tablet    DELTASONE    30 tablet    Take 1 tablet (10 mg) by mouth daily    Gout, unspecified cause, unspecified chronicity, unspecified site       sodium bicarbonate 650 MG tablet     180 tablet    TAKE 2 TABLETS(1300 MG) BY MOUTH THREE TIMES DAILY    Chronic kidney disease, unspecified

## 2017-06-21 NOTE — PROGRESS NOTES
Nephrology follow up    61yo aaM with diabetic/hypertensive nephropathy with albuminuria since 2004 here for follow up. Plan for PD catheter placement tomorrow. Doing well without nausea, vomiting, anorexia, fevers, chills. 4pt ROS negative.    PMHx  DM - since ~2000, no retinopathy/neuropathy  HTN - since ~2000  Dyslipidemia  GERD  CHF - admit 2008, diastolic dysfunction; EF 20-25% April 2017  CKD - since ~2000    Medications reviewed, of note  Hydralazine 50mg TID  Metoprolol 100mg daily  Losartan 100mg daily  Isosorbide mononitrate 60mg daily  Furosemide 80mg BID  Metolazone 2.5mg daily  Allopurinol 200mg daily  Atorvastatin 40mg daily  ASA 81mg daily  Omeprazole 40mg daily  darbe  K Cl 40mEq daily  NaHCO3 1300mg BID  Calcitriol 0.25mcg daily  prednisone  No NSAIDs    Exam   148/84   150/83   150/84  Gen - nad  CV - rrr, no rub, +KELLY  Resp - cta bilat  Ext - trace edema  Psych - pleasant, appropriate, talkative  Neuro - no asterixis    Labs     2014 2015    2016  2017   12/'02 12/'04 11/'06 8/'13 10/22 4/9 5/7 5/18 6/16 2/10 10/26 1/4 3/15 5/17 6/21  Cr 1.5  2.2 2.1 2.7 4.3 4.7 6.3 4.3  3.8  4.5  5.1  4.9  5.8  5.3  Uprot/Cr   1.3  Ualb/Cr 534    2270    7/3/13 UA - 100 alb, neg blood  5/5/15 UA - 100 alb, neg blood    Assessment/Recommendations: 61yo aaM with stage IV/V CKD with proteinuria.   CKD - (severe) stage V likely due to DM and hypertension. PD catheter placement scheduled for tomorrow.   - avoid NSAIDs    Volume/BP - BP not at goal. Target BP <140/90. Given surgery tomorrow and only mild increase above target, will continue current antihypertensives for now.    Anemia - Referred to anemia program.     Acid-base - bicarb 21, restarted on NaHCO3    Ca/phos - hypocalcemia improved with calcitriol    Electrolytes - hypokalemia improved    Hyperuricemia - uric acid improved with increased dose of allopurinol    Current RRT choice: PD  RTC in 2mo weeks for BP check, RP, CBC, uric acid, evaluate for Si/Sx  uremia (discussed follow up with Kat (EARL) and ok with pt)

## 2017-06-21 NOTE — LETTER
6/21/2017      RE: Murray Nicholson  [address]  [place] [address]       Nephrology follow up    63yo aaM with diabetic/hypertensive nephropathy with albuminuria since 2004 here for follow up. Plan for PD catheter placement tomorrow. Doing well without nausea, vomiting, anorexia, fevers, chills. 4pt ROS negative.    PMHx  DM - since ~2000, no retinopathy/neuropathy  HTN - since ~2000  Dyslipidemia  GERD  CHF - admit 2008, diastolic dysfunction; EF 20-25% April 2017  CKD - since ~2000    Medications reviewed, of note  Hydralazine 50mg TID  Metoprolol 100mg daily  Losartan 100mg daily  Isosorbide mononitrate 60mg daily  Furosemide 80mg BID  Metolazone 2.5mg daily  Allopurinol 200mg daily  Atorvastatin 40mg daily  ASA 81mg daily  Omeprazole 40mg daily  darbe  K Cl 40mEq daily  NaHCO3 1300mg BID  Calcitriol 0.25mcg daily  prednisone  No NSAIDs    Exam   148/84   150/83   150/84  Gen - nad  CV - rrr, no rub, +KELLY  Resp - cta bilat  Ext - trace edema  Psych - pleasant, appropriate, talkative  Neuro - no asterixis    Labs     2014 2015    2016  2017   12/'02 12/'04 11/'06 8/'13 10/22 4/9 5/7 5/18 6/16 2/10 10/26 1/4 3/15 5/17 6/21  Cr 1.5  2.2 2.1 2.7 4.3 4.7 6.3 4.3  3.8  4.5  5.1  4.9  5.8  5.3  Uprot/Cr   1.3  Ualb/Cr 534    2270    7/3/13 UA - 100 alb, neg blood  5/5/15 UA - 100 alb, neg blood    Assessment/Recommendations: 63yo aaM with stage IV/V CKD with proteinuria.   CKD - (severe) stage V likely due to DM and hypertension. PD catheter placement scheduled for tomorrow.   - avoid NSAIDs    Volume/BP - BP not at goal. Target BP <140/90. Given surgery tomorrow and only mild increase above target, will continue current antihypertensives for now.    Anemia - Referred to anemia program.     Acid-base - bicarb 21, restarted on NaHCO3    Ca/phos - hypocalcemia improved with calcitriol    Electrolytes - hypokalemia improved    Hyperuricemia - uric acid improved with increased dose of allopurinol    Current  RRT choice: PD  RTC in 2mo weeks for BP check, RP, CBC, uric acid, evaluate for Si/Sx uremia (discussed follow up with Kat (EAR[facility]) and ok with pt)      Brice Caraballo MD

## 2017-06-22 ENCOUNTER — HOSPITAL ENCOUNTER (OUTPATIENT)
Facility: CLINIC | Age: 62
Discharge: HOME | End: 2017-06-22
Attending: SURGERY | Admitting: SURGERY
Payer: COMMERCIAL

## 2017-06-22 ENCOUNTER — SURGERY (OUTPATIENT)
Age: 62
End: 2017-06-22

## 2017-06-22 ENCOUNTER — ANESTHESIA (OUTPATIENT)
Dept: SURGERY | Facility: CLINIC | Age: 62
End: 2017-06-22
Payer: COMMERCIAL

## 2017-06-22 VITALS
RESPIRATION RATE: 20 BRPM | DIASTOLIC BLOOD PRESSURE: 82 MMHG | WEIGHT: 186.29 LBS | HEIGHT: 69 IN | OXYGEN SATURATION: 100 % | TEMPERATURE: 97.8 F | BODY MASS INDEX: 27.59 KG/M2 | SYSTOLIC BLOOD PRESSURE: 139 MMHG

## 2017-06-22 DIAGNOSIS — D63.1 ANEMIA IN STAGE 5 CHRONIC KIDNEY DISEASE (H): ICD-10-CM

## 2017-06-22 DIAGNOSIS — G47.30 HYPERSOMNIA WITH SLEEP APNEA: Primary | ICD-10-CM

## 2017-06-22 DIAGNOSIS — D47.2 MGUS (MONOCLONAL GAMMOPATHY OF UNKNOWN SIGNIFICANCE): ICD-10-CM

## 2017-06-22 DIAGNOSIS — I71.21 ASCENDING AORTIC ANEURYSM (H): ICD-10-CM

## 2017-06-22 DIAGNOSIS — N18.5 CKD (CHRONIC KIDNEY DISEASE) STAGE 5, GFR LESS THAN 15 ML/MIN (H): ICD-10-CM

## 2017-06-22 DIAGNOSIS — N18.9 ANEMIA IN CHRONIC RENAL DISEASE: ICD-10-CM

## 2017-06-22 DIAGNOSIS — N18.5 ANEMIA IN STAGE 5 CHRONIC KIDNEY DISEASE (H): ICD-10-CM

## 2017-06-22 DIAGNOSIS — N18.5 TYPE 2 DIABETES MELLITUS WITH STAGE 5 CHRONIC KIDNEY DISEASE NOT ON CHRONIC DIALYSIS, WITHOUT LONG-TERM CURRENT USE OF INSULIN (H): ICD-10-CM

## 2017-06-22 DIAGNOSIS — I25.10 CORONARY ARTERY DISEASE INVOLVING NATIVE CORONARY ARTERY OF NATIVE HEART WITHOUT ANGINA PECTORIS: ICD-10-CM

## 2017-06-22 DIAGNOSIS — I10 ESSENTIAL HYPERTENSION: ICD-10-CM

## 2017-06-22 DIAGNOSIS — I50.20 SYSTOLIC CONGESTIVE HEART FAILURE, UNSPECIFIED CONGESTIVE HEART FAILURE CHRONICITY: ICD-10-CM

## 2017-06-22 DIAGNOSIS — D36.9 TUBULAR ADENOMA: ICD-10-CM

## 2017-06-22 DIAGNOSIS — D63.8 ANEMIA OF CHRONIC DISORDER: ICD-10-CM

## 2017-06-22 DIAGNOSIS — E78.5 HYPERLIPIDEMIA LDL GOAL <100: ICD-10-CM

## 2017-06-22 DIAGNOSIS — Q23.81 BICUSPID AORTIC VALVE: ICD-10-CM

## 2017-06-22 DIAGNOSIS — D63.1 ANEMIA IN CHRONIC RENAL DISEASE: ICD-10-CM

## 2017-06-22 DIAGNOSIS — D50.9 IRON DEFICIENCY ANEMIA, UNSPECIFIED IRON DEFICIENCY ANEMIA TYPE: ICD-10-CM

## 2017-06-22 DIAGNOSIS — I10 HYPERTENSION GOAL BP (BLOOD PRESSURE) < 130/80: ICD-10-CM

## 2017-06-22 DIAGNOSIS — I50.30 (HFPEF) HEART FAILURE WITH PRESERVED EJECTION FRACTION (H): ICD-10-CM

## 2017-06-22 DIAGNOSIS — E11.22 TYPE 2 DIABETES MELLITUS WITH STAGE 5 CHRONIC KIDNEY DISEASE NOT ON CHRONIC DIALYSIS, WITHOUT LONG-TERM CURRENT USE OF INSULIN (H): ICD-10-CM

## 2017-06-22 DIAGNOSIS — G47.10 HYPERSOMNIA WITH SLEEP APNEA: Primary | ICD-10-CM

## 2017-06-22 DIAGNOSIS — E78.5 DYSLIPIDEMIA: ICD-10-CM

## 2017-06-22 LAB
GLUCOSE BLDC GLUCOMTR-MCNC: 110 MG/DL (ref 70–99)
GLUCOSE BLDC GLUCOMTR-MCNC: 170 MG/DL (ref 70–99)

## 2017-06-22 PROCEDURE — S0077 INJECTION, CLINDAMYCIN PHOSP: HCPCS | Performed by: CLINICAL NURSE SPECIALIST

## 2017-06-22 PROCEDURE — 27210794 ZZH OR GENERAL SUPPLY STERILE: Performed by: SURGERY

## 2017-06-22 PROCEDURE — 25000125 ZZHC RX 250: Performed by: ANESTHESIOLOGY

## 2017-06-22 PROCEDURE — 71000027 ZZH RECOVERY PHASE 2 EACH 15 MINS: Performed by: SURGERY

## 2017-06-22 PROCEDURE — 71000014 ZZH RECOVERY PHASE 1 LEVEL 2 FIRST HR: Performed by: SURGERY

## 2017-06-22 PROCEDURE — 25000128 H RX IP 250 OP 636: Performed by: ANESTHESIOLOGY

## 2017-06-22 PROCEDURE — 25000125 ZZHC RX 250: Performed by: CLINICAL NURSE SPECIALIST

## 2017-06-22 PROCEDURE — 82962 GLUCOSE BLOOD TEST: CPT

## 2017-06-22 PROCEDURE — 25000125 ZZHC RX 250

## 2017-06-22 PROCEDURE — 37000009 ZZH ANESTHESIA TECHNICAL FEE, EACH ADDTL 15 MIN: Performed by: SURGERY

## 2017-06-22 PROCEDURE — 40000014 ZZH STATISTIC ARTERIAL MONITORING DAILY

## 2017-06-22 PROCEDURE — 25000566 ZZH SEVOFLURANE, EA 15 MIN: Performed by: SURGERY

## 2017-06-22 PROCEDURE — 36000059 ZZH SURGERY LEVEL 3 EA 15 ADDTL MIN UMMC: Performed by: SURGERY

## 2017-06-22 PROCEDURE — 25000125 ZZHC RX 250: Performed by: NURSE ANESTHETIST, CERTIFIED REGISTERED

## 2017-06-22 PROCEDURE — C1752 CATH,HEMODIALYSIS,SHORT-TERM: HCPCS | Performed by: SURGERY

## 2017-06-22 PROCEDURE — 36000057 ZZH SURGERY LEVEL 3 1ST 30 MIN - UMMC: Performed by: SURGERY

## 2017-06-22 PROCEDURE — 25000128 H RX IP 250 OP 636: Performed by: NURSE ANESTHETIST, CERTIFIED REGISTERED

## 2017-06-22 PROCEDURE — 71000015 ZZH RECOVERY PHASE 1 LEVEL 2 EA ADDTL HR: Performed by: SURGERY

## 2017-06-22 PROCEDURE — 40000275 ZZH STATISTIC RCP TIME EA 10 MIN

## 2017-06-22 PROCEDURE — C9290 INJ, BUPIVACAINE LIPOSOME: HCPCS | Performed by: ANESTHESIOLOGY

## 2017-06-22 PROCEDURE — 25000128 H RX IP 250 OP 636

## 2017-06-22 PROCEDURE — 37000008 ZZH ANESTHESIA TECHNICAL FEE, 1ST 30 MIN: Performed by: SURGERY

## 2017-06-22 PROCEDURE — 40000170 ZZH STATISTIC PRE-PROCEDURE ASSESSMENT II: Performed by: SURGERY

## 2017-06-22 DEVICE — CATH DIALYSIS PERITONEAL FLEX-NECK ARC 54CM CF-5460
Type: IMPLANTABLE DEVICE | Status: NON-FUNCTIONAL
Removed: 2018-01-15

## 2017-06-22 RX ORDER — NALOXONE HYDROCHLORIDE 0.4 MG/ML
.1-.4 INJECTION, SOLUTION INTRAMUSCULAR; INTRAVENOUS; SUBCUTANEOUS
Status: DISCONTINUED | OUTPATIENT
Start: 2017-06-22 | End: 2017-06-22 | Stop reason: HOSPADM

## 2017-06-22 RX ORDER — PROPOFOL 10 MG/ML
INJECTION, EMULSION INTRAVENOUS PRN
Status: DISCONTINUED | OUTPATIENT
Start: 2017-06-22 | End: 2017-06-22

## 2017-06-22 RX ORDER — CLINDAMYCIN PHOSPHATE 900 MG/50ML
900 INJECTION, SOLUTION INTRAVENOUS
Status: COMPLETED | OUTPATIENT
Start: 2017-06-22 | End: 2017-06-22

## 2017-06-22 RX ORDER — ONDANSETRON 4 MG/1
4 TABLET, ORALLY DISINTEGRATING ORAL EVERY 30 MIN PRN
Status: DISCONTINUED | OUTPATIENT
Start: 2017-06-22 | End: 2017-06-22 | Stop reason: HOSPADM

## 2017-06-22 RX ORDER — BUPIVACAINE HYDROCHLORIDE AND EPINEPHRINE 2.5; 5 MG/ML; UG/ML
INJECTION, SOLUTION INFILTRATION; PERINEURAL PRN
Status: DISCONTINUED | OUTPATIENT
Start: 2017-06-22 | End: 2017-06-22

## 2017-06-22 RX ORDER — CLINDAMYCIN PHOSPHATE 900 MG/50ML
900 INJECTION, SOLUTION INTRAVENOUS SEE ADMIN INSTRUCTIONS
Status: DISCONTINUED | OUTPATIENT
Start: 2017-06-22 | End: 2017-06-22 | Stop reason: HOSPADM

## 2017-06-22 RX ORDER — SODIUM CHLORIDE, SODIUM LACTATE, POTASSIUM CHLORIDE, CALCIUM CHLORIDE 600; 310; 30; 20 MG/100ML; MG/100ML; MG/100ML; MG/100ML
INJECTION, SOLUTION INTRAVENOUS CONTINUOUS
Status: DISCONTINUED | OUTPATIENT
Start: 2017-06-22 | End: 2017-06-22 | Stop reason: HOSPADM

## 2017-06-22 RX ORDER — ALBUTEROL SULFATE 0.83 MG/ML
2.5 SOLUTION RESPIRATORY (INHALATION) ONCE
Status: DISCONTINUED | OUTPATIENT
Start: 2017-06-22 | End: 2017-06-22 | Stop reason: HOSPADM

## 2017-06-22 RX ORDER — ACETAMINOPHEN 325 MG/1
975 TABLET ORAL ONCE
Status: DISCONTINUED | OUTPATIENT
Start: 2017-06-22 | End: 2017-06-22 | Stop reason: HOSPADM

## 2017-06-22 RX ORDER — SODIUM CHLORIDE 9 MG/ML
INJECTION, SOLUTION INTRAVENOUS CONTINUOUS PRN
Status: DISCONTINUED | OUTPATIENT
Start: 2017-06-22 | End: 2017-06-22

## 2017-06-22 RX ORDER — FLUMAZENIL 0.1 MG/ML
0.2 INJECTION, SOLUTION INTRAVENOUS
Status: DISCONTINUED | OUTPATIENT
Start: 2017-06-22 | End: 2017-06-22 | Stop reason: HOSPADM

## 2017-06-22 RX ORDER — FENTANYL CITRATE 50 UG/ML
25-50 INJECTION, SOLUTION INTRAMUSCULAR; INTRAVENOUS
Status: DISCONTINUED | OUTPATIENT
Start: 2017-06-22 | End: 2017-06-22 | Stop reason: HOSPADM

## 2017-06-22 RX ORDER — GABAPENTIN 300 MG/1
300 CAPSULE ORAL ONCE
Status: DISCONTINUED | OUTPATIENT
Start: 2017-06-22 | End: 2017-06-22 | Stop reason: HOSPADM

## 2017-06-22 RX ORDER — ONDANSETRON 2 MG/ML
4 INJECTION INTRAMUSCULAR; INTRAVENOUS EVERY 30 MIN PRN
Status: DISCONTINUED | OUTPATIENT
Start: 2017-06-22 | End: 2017-06-22 | Stop reason: HOSPADM

## 2017-06-22 RX ORDER — MEPERIDINE HYDROCHLORIDE 25 MG/ML
12.5 INJECTION INTRAMUSCULAR; INTRAVENOUS; SUBCUTANEOUS
Status: DISCONTINUED | OUTPATIENT
Start: 2017-06-22 | End: 2017-06-22 | Stop reason: HOSPADM

## 2017-06-22 RX ORDER — GLYCOPYRROLATE 0.2 MG/ML
INJECTION, SOLUTION INTRAMUSCULAR; INTRAVENOUS PRN
Status: DISCONTINUED | OUTPATIENT
Start: 2017-06-22 | End: 2017-06-22

## 2017-06-22 RX ORDER — SODIUM CHLORIDE 9 MG/ML
INJECTION, SOLUTION INTRAVENOUS CONTINUOUS
Status: DISCONTINUED | OUTPATIENT
Start: 2017-06-22 | End: 2017-06-22 | Stop reason: HOSPADM

## 2017-06-22 RX ORDER — OXYCODONE HYDROCHLORIDE 5 MG/1
5 TABLET ORAL EVERY 6 HOURS PRN
Qty: 18 TABLET | Refills: 0 | Status: ON HOLD | OUTPATIENT
Start: 2017-06-22 | End: 2017-06-26

## 2017-06-22 RX ORDER — NEOSTIGMINE METHYLSULFATE 1 MG/ML
VIAL (ML) INJECTION PRN
Status: DISCONTINUED | OUTPATIENT
Start: 2017-06-22 | End: 2017-06-22

## 2017-06-22 RX ORDER — LIDOCAINE HYDROCHLORIDE 20 MG/ML
INJECTION, SOLUTION INFILTRATION; PERINEURAL PRN
Status: DISCONTINUED | OUTPATIENT
Start: 2017-06-22 | End: 2017-06-22

## 2017-06-22 RX ADMIN — CISATRACURIUM BESYLATE 2 MG: 2 INJECTION INTRAVENOUS at 08:38

## 2017-06-22 RX ADMIN — MIDAZOLAM HYDROCHLORIDE 1 MG: 1 INJECTION, SOLUTION INTRAMUSCULAR; INTRAVENOUS at 07:45

## 2017-06-22 RX ADMIN — GLYCOPYRROLATE 0.2 MG: 0.2 INJECTION, SOLUTION INTRAMUSCULAR; INTRAVENOUS at 09:35

## 2017-06-22 RX ADMIN — SODIUM CHLORIDE: 9 INJECTION, SOLUTION INTRAVENOUS at 07:30

## 2017-06-22 RX ADMIN — CLINDAMYCIN PHOSPHATE 900 MG: 18 INJECTION, SOLUTION INTRAVENOUS at 08:02

## 2017-06-22 RX ADMIN — HYDROCORTISONE SODIUM SUCCINATE 100 MG: 100 INJECTION, POWDER, FOR SOLUTION INTRAMUSCULAR; INTRAVENOUS at 08:06

## 2017-06-22 RX ADMIN — MIDAZOLAM HYDROCHLORIDE 1 MG: 1 INJECTION, SOLUTION INTRAMUSCULAR; INTRAVENOUS at 07:38

## 2017-06-22 RX ADMIN — PROPOFOL 100 MG: 10 INJECTION, EMULSION INTRAVENOUS at 07:44

## 2017-06-22 RX ADMIN — FENTANYL CITRATE 50 MCG: 50 INJECTION, SOLUTION INTRAMUSCULAR; INTRAVENOUS at 07:23

## 2017-06-22 RX ADMIN — LIDOCAINE HYDROCHLORIDE 100 MG: 20 INJECTION, SOLUTION INFILTRATION; PERINEURAL at 07:42

## 2017-06-22 RX ADMIN — CISATRACURIUM BESYLATE 2 MG: 2 INJECTION INTRAVENOUS at 07:48

## 2017-06-22 RX ADMIN — Medication 1 MG: at 09:38

## 2017-06-22 RX ADMIN — GLYCOPYRROLATE 0.2 MG: 0.2 INJECTION, SOLUTION INTRAMUSCULAR; INTRAVENOUS at 09:41

## 2017-06-22 RX ADMIN — PROPOFOL 50 MG: 10 INJECTION, EMULSION INTRAVENOUS at 07:50

## 2017-06-22 RX ADMIN — Medication 1 MG: at 09:31

## 2017-06-22 RX ADMIN — CISATRACURIUM BESYLATE 2 MG: 2 INJECTION INTRAVENOUS at 09:20

## 2017-06-22 RX ADMIN — GLYCOPYRROLATE 0.2 MG: 0.2 INJECTION, SOLUTION INTRAMUSCULAR; INTRAVENOUS at 09:38

## 2017-06-22 RX ADMIN — BUPIVACAINE HYDROCHLORIDE AND EPINEPHRINE BITARTRATE 20 ML: 2.5; .005 INJECTION, SOLUTION INFILTRATION; PERINEURAL at 07:23

## 2017-06-22 RX ADMIN — FENTANYL CITRATE 50 MCG: 50 INJECTION, SOLUTION INTRAMUSCULAR; INTRAVENOUS at 07:39

## 2017-06-22 RX ADMIN — Medication 1 MG: at 09:35

## 2017-06-22 RX ADMIN — CISATRACURIUM BESYLATE 6 MG: 2 INJECTION INTRAVENOUS at 07:45

## 2017-06-22 RX ADMIN — BUPIVACAINE 20 ML: 13.3 INJECTION, SUSPENSION, LIPOSOMAL INFILTRATION at 07:23

## 2017-06-22 RX ADMIN — MIDAZOLAM 1 MG: 1 INJECTION INTRAMUSCULAR; INTRAVENOUS at 07:23

## 2017-06-22 RX ADMIN — CISATRACURIUM BESYLATE 2 MG: 2 INJECTION INTRAVENOUS at 08:13

## 2017-06-22 RX ADMIN — FENTANYL CITRATE 25 MCG: 50 INJECTION, SOLUTION INTRAMUSCULAR; INTRAVENOUS at 08:21

## 2017-06-22 RX ADMIN — GLYCOPYRROLATE 0.2 MG: 0.2 INJECTION, SOLUTION INTRAMUSCULAR; INTRAVENOUS at 09:31

## 2017-06-22 RX ADMIN — Medication 1 MG: at 09:41

## 2017-06-22 ASSESSMENT — COPD QUESTIONNAIRES: COPD: 1

## 2017-06-22 ASSESSMENT — NEW YORK HEART ASSOCIATION (NYHA) CLASSIFICATION: NYHA FUNCTIONAL CLASS: III

## 2017-06-22 NOTE — ANESTHESIA PROCEDURE NOTES
Arterial Line Procedure Note  Staff:     Anesthesiologist:  PAM HAMILTON    Resident/CRNA:  CADY CERDA    Arterial line performed by resident/CRNA in presence of a teaching physician    Location: In OR Before Induction  Procedure Start/Stop Times:     patient identified, IV checked, risks and benefits discussed, informed consent, monitors and equipment checked, pre-op evaluation and at physician/surgeon's request      Correct Patient: Yes      Correct Position: Yes      Correct Site: Yes      Correct Procedure: Yes      Correct Laterality:  Yes    Site Marked:  N/A  Line Placement:     Procedure:  Arterial Line    Insertion Site:  Radial    Insertion laterality:  Left    Skin Prep: Chloraprep      Patient Prep: patient draped, mask, sterile gloves, hat and hand hygiene      Local skin infiltration:  1% lidocaine    Ultrasound Guided?: No      Catheter size:  20 gauge, 12 cm    Cath secured with: suture      Dressing:  Tegaderm    Complications:  None obvious    Arterial waveform: Yes      IBP within 10% of NIBP: Yes

## 2017-06-22 NOTE — ANESTHESIA PROCEDURE NOTES
Peripheral Nerve Block Procedure Note    Staff:     Anesthesiologist:  OSIEL BLISS    Resident/CRNA:  SANTOSH BRIDGES    Block performed by resident/CRNA in the presence of a teaching physician    Location: Pre-op  Procedure Start/Stop TImes:      6/22/2017 7:18 AM     6/22/2017 7:28 AM    patient identified, IV checked, site marked, risks and benefits discussed, informed consent, monitors and equipment checked, pre-op evaluation, at physician/surgeon's request and post-op pain management      Correct Patient: Yes      Correct Position: Yes      Correct Site: Yes      Correct Procedure: Yes      Correct Laterality:  Yes    Site Marked:  Yes  Procedure details:     Procedure:  TAP    ASA:  4    Diagnosis:  Post op pain control     Laterality:  Bilateral    Position:  Supine    Sterile Prep: chloraprep      Local skin infiltration:  None    Needle:  Touhy needle    Needle gauge:  21    Needle length (mm):  110    Ultrasound: Yes      Ultrasound used to identify targeted nerve, plexus, or vascular structure and placed a needle adjacent to it      Permanent Image entered into patiient's record      Abnormal pain on injection: No      Blood Aspirated: No      Paresthesias:  No    Bleeding at site: No      Bolus via:  Needle    Infusion Method:  Single Shot    Complications:  None

## 2017-06-22 NOTE — PROGRESS NOTES
Bilateral tap block done preoperatively.  VSS. On 2L NC, sats %.  Tolerated procedure without problems.

## 2017-06-22 NOTE — ANESTHESIA PREPROCEDURE EVALUATION
Anesthesia Evaluation     .             ROS/MED HX    ENT/Pulmonary:     (+)sleep apnea, COPD, , . .    Neurologic:       Cardiovascular:     (+) Dyslipidemia, hypertension--CAD, --. : . CHF Last EF: 20% NYHA classification: III. . :. valvular problems/murmurs type: AI mild to moderate, bicuspid AV, asc aortic aneuysm:. Previous cardiac testing Echodate:4/2017results:Interpretation Summary  Left ventricular systolic function is severely reduced with ejection fraction  estimated at 20-25% with severe global hypokinesis. The left ventricle is  severely dilated (LVIDd 7.60cm) with normal thickness.  Global right ventricular function is normal. Mild right ventricular dilation  is present.  The aortic valve is functionally bicuspid with fusion of the left and right  coronary cusps with sclerosis. There is mild to moderate eccentric aortic  insufficiency that is directed posteriorly.  The aortic root is dilated at the sinus of valsalva(4.6cm). There is a  moderate-sized aneurym involving the proximal ascending aorta (4.6cm).  Dilation of the inferior vena cava is present with normal respiratory  variation in diameter.  Compared to the previous study dated 2/2016, there has been interval reduction  in left ventricular systolic function.    date: results: date: results:Cath date: 2006 results:CAD (last coronary angio in 2006 showing 60-70% LAD lesion in dLAD FFR 0.85 and 30% pRCA-- Right dominant), functionally bicuspid AV with ascending aortic aneurysm (4.6cm on last           METS/Exercise Tolerance:  3 - Able to walk 1-2 blocks without stopping   Hematologic:     (+) Anemia, -      Musculoskeletal:         GI/Hepatic:     (+) GERD       Renal/Genitourinary:     (+) chronic renal disease, type: ESRD, Pt does not require dialysis, Pt has no history of transplant,       Endo:         Psychiatric:         Infectious Disease:         Malignancy:         Other:                     Physical Exam  Normal systems:  pulmonary    Airway   Mallampati: II  TM distance: >3 FB  Neck ROM: full    Dental     Cardiovascular   Rhythm and rate: regular  (+) murmur       Pulmonary     Other findings: Awake, alert, appropriate in NAD.                Anesthesia Plan      History & Physical Review  History and physical reviewed and following examination; no interval change.    ASA Status:  4 .    NPO Status:  > 8 hours    Plan for General with Intravenous induction. Maintenance will be Inhalation.    PONV prophylaxis:  Ondansetron (or other 5HT-3) and Dexamethasone or Solumedrol  Additional equipment: Arterial Line I have examined the patient and reviewed the chart.  Pre induction arterial line.  CATHRYN Shelton MD      Postoperative Care  Postoperative pain management:  IV analgesics and Oral pain medications.      Consents  Anesthetic plan, risks, benefits and alternatives discussed with:  Patient..                          .

## 2017-06-22 NOTE — IP AVS SNAPSHOT
Post Anesthesia Care Unit [address] [address]    Phone:  [phone]                                       After Visit Summary   6/22/2017    Murray Nicholson    MRN: [id number]           After Visit Summary Signature Page     I have received my discharge instructions, and my questions have been answered. I have discussed any challenges I see with this plan with the nurse or doctor.    ..........................................................................................................................................  Patient/Patient Representative Signature      ..........................................................................................................................................  Patient Representative Print Name and Relationship to Patient    ..................................................               ................................................  Date                                            Time    ..........................................................................................................................................  Reviewed by Signature/Title    ...................................................              ..............................................  Date                                                            Time

## 2017-06-22 NOTE — ANESTHESIA CARE TRANSFER NOTE
Patient: Murray Nicholson    Procedure(s):  Laparoscopic Peritoneal Dialysis Catheter Placement - Anesthesia with block - Wound Class: I-Clean    Diagnosis: Chronic Kidney Disease   Diagnosis Additional Information: No value filed.    Anesthesia Type:   General     Note:  Airway :Face Mask  Patient transferred to:PACU  Comments: VSS.  Report given to RN.      Vitals: (Last set prior to Anesthesia Care Transfer)    CRNA VITALS  6/22/2017 0921 - 6/22/2017 0957      6/22/2017             Resp Rate (set): 10                Electronically Signed By: VERONICA Anthony CRNA  June 22, 2017  9:57 AM

## 2017-06-22 NOTE — DISCHARGE INSTRUCTIONS
Take it easy when you get home.  Remember, same day surgery DOES NOT MEAN SAME DAY RECOVERY!  Healing is a gradual process.  You will need some time to recover - you may be more tired than you realize at first.  Rest and relax for at least the first 24 hours at home.  You'll feel better and heal faster if you take good care of yourself.    [facility], [place]  Same-Day Surgery   Adult Discharge Orders & Instructions     For 24 hours after surgery    1. Get plenty of rest.  A responsible adult must stay with you for at least 24 hours after you leave the hospital.   2. Do not drive or use heavy equipment.  If you have weakness or tingling, don't drive or use heavy equipment until this feeling goes away.  3. Do not drink alcohol.  4. Avoid strenuous or risky activities.  Ask for help when climbing stairs.   5. You may feel lightheaded.  IF so, sit for a few minutes before standing.  Have someone help you get up.   6. If you have nausea (feel sick to your stomach): Drink only clear liquids such as apple juice, ginger ale, broth or 7-Up.  Rest may also help.  Be sure to drink enough fluids.  Move to a regular diet as you feel able.  7. You may have a slight fever. Call the doctor if your fever is over 100 F (37.7 C) (taken under the tongue) or lasts longer than 24 hours.  8. You may have a dry mouth, a sore throat, muscle aches or trouble sleeping.  These should go away after 24 hours.  9. Do not make important or legal decisions.   Call your doctor for any of the followin.  Signs of infection (fever, growing tenderness at the surgery site, a large amount of drainage or bleeding, severe pain, foul-smelling drainage, redness, swelling).    2. It has been over 8 to 10 hours since surgery and you are still not able to urinate (pass water).    3.  Headache for over 24 hours.    To contact a doctor, call Dr Arvizu's office at [phone] or:        [phone] and ask for the resident on  call for Transplant Surgery (answered 24 hours a day)      Emergency Department:    [facility]: [phone]       (TTY for hearing impaired: [phone])

## 2017-06-22 NOTE — ADDENDUM NOTE
Addendum  created 06/22/17 1058 by Alma Rosa Chaparro APRN CRNA    Anesthesia Intra Flowsheets edited

## 2017-06-22 NOTE — ANESTHESIA POSTPROCEDURE EVALUATION
Patient: Murray Nicholson    Procedure(s):  Laparoscopic Peritoneal Dialysis Catheter Placement - Anesthesia with block - Wound Class: I-Clean    Diagnosis:Chronic Kidney Disease   Diagnosis Additional Information: No value filed.    Anesthesia Type:  General    Note:  Anesthesia Post Evaluation    Patient location during evaluation: PACU  Patient participation: Able to fully participate in evaluation  Level of consciousness: awake and alert  Pain management: adequate  Airway patency: patent  Cardiovascular status: acceptable  Respiratory status: acceptable  Hydration status: acceptable  PONV: none             Last vitals:  Vitals:    06/22/17 0725 06/22/17 0954 06/22/17 1000   BP: 130/74 133/81 123/76   Resp: 15 12 16   Temp:   36.4  C (97.6  F)   SpO2: 100% 100% 100%         Electronically Signed By: Mart Shelton MD  June 22, 2017  10:10 AM

## 2017-06-22 NOTE — OP NOTE
Transplant Surgery  Operative Note    Preop Dx:  Chronic renal failure.  Postop Dx: Same  Procedure: Laparoscopic PD catheter placement.   Surgeon: Esteban Arvizu M.D., M.S.  Fellow: Jennifer Huffman M.D. Fellow  Anesthesia: General.  EBL: 10 ml  Fluids: 600 cc crystalloid  UO: 45 ml  Drains: None.  Specimen: None.  Complications: None.  Findings: Normal. Catheter in good position with good flow.   Indication: The patient has Chronic kidney failure and is in need of permanent dialysis access.  After discussing the risks and benefits of proceeding, the patient agreed to proceed with surgery and provided informed consent.     Procedure: The patient was brought to the operating room and placed supine on the operating table.  We used the stencil from the kit to alberto the catheter course and exit site.  After induction of general anesthesia, the abdomen was prepped and draped in the usual fashion.  A doyle catheter and orogastric tube were placed to decompress the bladder and stomach. A time-out was performed to ensure the correct patient and procedure.  Perioperative antibiotics were given.  A 5 mm trocar was placed  in the right paramedian space lateral to the umbilicus, over which a port was advanced.  We insufflated the abdomen to 8 mmHg and performed a diagnostic laparoscopy with the 5mm camera.  The laparoscopic survey did not reveal any abnormalities. A 7-8 mm Step port expander sheath was placed over a Veress needle and under direct visualization, the Veress needle was walked caudad along the left posterior rectus sheath.  Then we entered the peritoneal cavity approximately 4 cm caudad to the skin incision.  We removed the Veress needle and dilated the sheath with the port, loaded the catheter on a stylet and inserted it through the port down into the true pelvis, taking care to maintain correct orientation of the catheter.  At this point, we removed the stylet. Care was taken to make sure that the distal cuff  remained outside the peritoneal space.  The abdomen was desufflated.  We used the Candice tunneling device to externalize the catheter out the previously marked site. We connected the end of the catheter to a liter of saline and allowed it to run in.  It ran in easily.  We placed the bag on the floor and by gravity drainage the fluid drained easily for a near complete evacuation of non-bloody saline.       All the ports were removed.  The skin incisions were closed with Monocryl and Dermabond and the sterile extension set and minicap was placed on the end of the catheter.  A sterile occlusive dressing was applied over the catheter exit site.  All needle and sponge counts were correct x 2.  The patient was awakened from anesthesia and transported to the recovery room, having tolerated the procedure well.  There were no apparent complications.   Faculty was present for the critical portions of the procedure.     Physician Attestation   I was present for the entire procedure between opening and closing.    Esteban Arvizu  Date of Service (when I saw the patient): 06/22/17

## 2017-06-22 NOTE — IP AVS SNAPSHOT
MRN:[id number]                      After Visit Summary   6/22/2017    Murray Nicholson    MRN: [id number]           Thank you!     Thank you for choosing [facility] for your care. Our goal is always to provide you with excellent care. Hearing back from our patients is one way we can continue to improve our services. Please take a few minutes to complete the written survey that you may receive in the mail after you visit with us. Thank you!        Patient Information     Date Of Birth          1955        About your hospital stay     You were admitted on:  June 22, 2017 You last received care in the:  Post Anesthesia Care Unit [facility]    You were discharged on:  June 22, 2017        Reason for your hospital stay       Admitted for laparoscopic PD insertion                  Who to Call     For medical emergencies, please call 911.  For non-urgent questions about your medical care, please call your primary care provider or clinic, [phone]  For questions related to your surgery, please call your surgery clinic        Attending Provider     Provider Specialty    Esteban Arvizu MD Transplant       Primary Care Provider Office Phone # Fax #    Yahir Turcios [phone]      After Care Instructions     Discharge Instructions       Peritoneal Dialysis Catheter post op discharge order/instructions   A peritoneal dialysis catheter has been placed into your abdomen dialysis access. The following are instructions for care of your catheter:  Keep your catheter insertion sites dry and covered at all times. To prevent infection, DO NOT REMOVE DRESSING OR PEEK UNDER THE DRESSING. The home dialysis nurse will change your dressing the first time. You will be trained on how to change your dressing.  If the dressing becomes wet from any drainage, you may add a 4x4 sterile gauze and reinforce with tape and contact your physician/nurse practitioner/home dialysis nurse in your area to let  them know about the drainage.   DO NOT take a shower while the catheter site is healing. This usually takes 2-4 weeks. Take sponge baths only. You CANNOT ever take a tub bath or swimming while you have a peritoneal dialysis catheter.  The catheter should be oriented appropriately at all times. This is done by having the catheter taped down to your skin to avoid tugging or pulling.   Activity guidelines:  Do not lift anything over 15 lbs for the first month  Avoid climbing stairs, if at all possible, during the first month of healing   Avoid getting constipated or straining with your bowel movements  Take your medicines as ordered by your doctor. Continue taking stool softeners while taking pain medications.  Continue taking your renal diet.  Call the home dialysis unit in your area to schedule an appointment to have your dressing changed and education on the care of your catheter.  Call the clinic/[facility] ([phone]) to schedule a follow up appointment within the next two weeks  CALL YOUR HEALTH CARE PROVIDER RIGHT AWAY IF YOU HAVE ANY OF THE FOLLOWING:  Fever of 100.5 F (38.0 C) or higher  Chills  Pain in your abdomen or around your catheter that is not controlled by pain medications.  Hardness, warmth, redness or drainage around you catheter or around any incisions.  Block flow into or out of your catheter  Dialysate that is cloudy or bloody when it drains from your body  Part of your abdomen appears to be bulging out more than normal.  Severe coughing                  Your next 10 appointments already scheduled     Jul 05, 2017  1:00 PM CDT   (Arrive by 12:45 PM)   Post-Op with VERONICA Torres [facility] Solid Organ Transplant ([facility])    [address] [place] [id number] [phone]            Aug 02, 2017  1:00 PM CDT   Lab with  LAB   [facility] Lab ([facility])    [address] [place] [id number]    [phone]            Aug 02, 2017  2:30 PM CDT   (Arrive by 2:00 PM)   Return Visit with Brice Caraballo MD   [facility] ([facility])    [address] [address] [phone]              Further instructions from your care team       Take it easy when you get home.  Remember, same day surgery DOES NOT MEAN SAME DAY RECOVERY!  Healing is a gradual process.  You will need some time to recover - you may be more tired than you realize at first.  Rest and relax for at least the first 24 hours at home.  You'll feel better and heal faster if you take good care of yourself.    [facility] [place]  Same-Day Surgery   Adult Discharge Orders & Instructions     For 24 hours after surgery    1. Get plenty of rest.  A responsible adult must stay with you for at least 24 hours after you leave the hospital.   2. Do not drive or use heavy equipment.  If you have weakness or tingling, don't drive or use heavy equipment until this feeling goes away.  3. Do not drink alcohol.  4. Avoid strenuous or risky activities.  Ask for help when climbing stairs.   5. You may feel lightheaded.  IF so, sit for a few minutes before standing.  Have someone help you get up.   6. If you have nausea (feel sick to your stomach): Drink only clear liquids such as apple juice, ginger ale, broth or 7-Up.  Rest may also help.  Be sure to drink enough fluids.  Move to a regular diet as you feel able.  7. You may have a slight fever. Call the doctor if your fever is over 100 F (37.7 C) (taken under the tongue) or lasts longer than 24 hours.  8. You may have a dry mouth, a sore throat, muscle aches or trouble sleeping.  These should go away after 24 hours.  9. Do not make important or legal decisions.   Call your doctor for any of the followin.  Signs of infection (fever, growing tenderness at the surgery site, a large amount of drainage or  "bleeding, severe pain, foul-smelling drainage, redness, swelling).    2. It has been over 8 to 10 hours since surgery and you are still not able to urinate (pass water).    3.  Headache for over 24 hours.    To contact a doctor, call Dr Arvizu's office at [phone] or:        [phone] and ask for the resident on call for Transplant Surgery (answered 24 hours a day)      Emergency Department:    [facility]: [phone]       (TTY for hearing impaired: [phone])                Pending Results     No orders found from 6/20/2017 to 6/23/2017.            Admission Information     Date & Time Provider Department Dept. Phone    6/22/2017 Esteban Arvizu MD Post Anesthesia Care Unit [facility] [phone]      Your Vitals Were     Blood Pressure Temperature Respirations Height Weight Pulse Oximetry    134/79 97.6  F (36.4  C) (Axillary) 16 1.753 m (5' 9\") 84.5 kg (186 lb 4.6 oz) 99%    BMI (Body Mass Index)                   27.51 kg/m2           [employer] Information     [employer] gives you secure access to your electronic health record. If you see a primary care provider, you can also send messages to your care team and make appointments. If you have questions, please call your primary care clinic.  If you do not have a primary care provider, please call [phone] and they will assist you.        Care EveryWhere ID     This is your Care EveryWhere ID. This could be used by other organizations to access your [facility] medical records  [id number]        Equal Access to Services     PRISCA HAUSER : Hadii marsha wang hadasho Soomaali, waaxda luqadaha, qaybta kaalmada adeegyada, jose ayala. So [facility] [phone].    ATENCIÓN: Si habla español, tiene a ortiz disposición servicios gratuitos de asistencia lingüística. Llame al [phone].    We comply with applicable federal civil rights laws and [place] laws. We do not discriminate on the basis of race, color, national origin, age, " disability sex, sexual orientation or gender identity.               Review of your medicines      START taking        Dose / Directions    oxyCODONE 5 MG IR tablet   Commonly known as:  ROXICODONE   Used for:  CKD (chronic kidney disease) stage 5, GFR less than 15 ml/min (H)        Dose:  5 mg   Take 1 tablet (5 mg) by mouth every 6 hours as needed for pain maximum 4 tablet(s) per day   Quantity:  18 tablet   Refills:  0         CONTINUE these medicines which have NOT CHANGED        Dose / Directions    acetaminophen 325 MG tablet   Commonly known as:  TYLENOL   Used for:  Gout, unspecified cause, unspecified chronicity, unspecified site        Dose:  325-650 mg   Take 1-2 tablets (325-650 mg) by mouth every 4 hours as needed for mild pain Do not take more than 10 tablets in 24 hours   Quantity:  100 tablet   Refills:  0       albuterol 108 (90 BASE) MCG/ACT Inhaler   Commonly known as:  PROAIR HFA/PROVENTIL HFA/VENTOLIN HFA   Used for:  Cough        Dose:  2 puff   Inhale 2 puffs into the lungs every 6 hours as needed for shortness of breath / dyspnea or wheezing   Quantity:  1 Inhaler   Refills:  0       allopurinol 100 MG tablet   Commonly known as:  ZYLOPRIM   Used for:  Gout, unspecified cause, unspecified chronicity, unspecified site        Dose:  200 mg   Take 2 tablets (200 mg) by mouth daily Start with 150 mg daily for 2 weeks then go to 200 mg daily.   Quantity:  90 tablet   Refills:  1       aspirin 81 MG tablet        Take 1 tablet (81 mg) by mouth at bedtime   Refills:  0       atorvastatin 40 MG tablet   Commonly known as:  LIPITOR   Used for:  CKD (chronic kidney disease) stage 3, GFR 30-59 ml/min, Hyperlipidemia LDL goal <100        Dose:  40 mg   Take 1 tablet (40 mg) by mouth daily   Quantity:  90 tablet   Refills:  3       calcitRIOL 0.25 MCG capsule   Commonly known as:  ROCALTROL   Used for:  Hypocalcemia        Dose:  0.25 mcg   Take 1 capsule (0.25 mcg) by mouth daily   Quantity:  90 capsule    Refills:  0       darbepoetin emily 100 MCG/0.5ML injection   Commonly known as:  ARANESP (ALBUMIN FREE)   Used for:  Anemia in stage 5 chronic kidney disease (H), CKD (chronic kidney disease) stage 5, GFR less than 15 ml/min (H)        Dose:  100 mcg   Inject 0.5 mLs (100 mcg) Subcutaneous every 14 days As needed for hgb<10g/dL.  If Hgb increases >1 point in 2 weeks (if blood transfusion given, use hgb PRIOR to this), SYSTOLIC BP > 180 mmHg or hgb>=10g/dL, HOLD DOSE. Dose must be within 1 week of Hgb.  Per anemia protocol with Brice Caraballo MD   Quantity:  0.5 mL   Refills:  99       docusate sodium 100 MG capsule   Commonly known as:  COLACE   Used for:  Constipation, unspecified constipation type        Dose:  100 mg   Take 1 capsule (100 mg) by mouth 2 times daily   Quantity:  60 capsule   Refills:  1       furosemide 80 MG tablet   Commonly known as:  LASIX   Used for:  Congestive heart failure, unspecified congestive heart failure chronicity, unspecified congestive heart failure type (H)        Dose:  80 mg   Take 1 tablet (80 mg) by mouth 2 times daily In the morning and at 3 PM   Quantity:  120 tablet   Refills:  0       glipiZIDE 5 MG tablet   Commonly known as:  GLUCOTROL   Used for:  Type 2 diabetes mellitus with other specified complication (H)        Take 1 tablet by mouth. TAKE 1 TABLET BY MOUTH DAILY BEFORE A MEAL   Quantity:  90 tablet   Refills:  0       hydrALAZINE 50 MG tablet   Commonly known as:  APRESOLINE   Used for:  Type 2 diabetes mellitus with stage 5 chronic kidney disease not on chronic dialysis, without long-term current use of insulin (H)        Dose:  50 mg   Take 1 tablet (50 mg) by mouth 3 times daily   Quantity:  270 tablet   Refills:  3       isosorbide mononitrate 60 MG 24 hr tablet   Commonly known as:  IMDUR   Used for:  Chronic systolic congestive heart failure (H)        Dose:  60 mg   Take 1 tablet (60 mg) by mouth daily   Quantity:  90 tablet   Refills:  3       loratadine  10 MG tablet   Commonly known as:  CLARITIN   Used for:  Hypertensive cardiopathy, SOB (shortness of breath)        Dose:  10 mg   Take 10 mg by mouth daily as needed Reported on 5/3/2017   Refills:  0       losartan 100 MG tablet   Commonly known as:  COZAAR   Used for:  Renal hypertension, stage 1-4 or unspecified chronic kidney disease        Dose:  100 mg   Take 1 tablet (100 mg) by mouth daily   Quantity:  90 tablet   Refills:  0       metolazone 2.5 MG tablet   Commonly known as:  ZAROXOLYN   Used for:  Other hypervolemia        Dose:  2.5 mg   Take 1 tablet (2.5 mg) by mouth daily   Quantity:  90 tablet   Refills:  1       metoprolol 100 MG 24 hr tablet   Commonly known as:  TOPROL XL   Used for:  Chronic systolic congestive heart failure (H)        Dose:  100 mg   Take 1 tablet (100 mg) by mouth daily   Quantity:  90 tablet   Refills:  3       omeprazole 20 MG CR capsule   Commonly known as:  priLOSEC        Dose:  20 mg   Take 20 mg by mouth daily   Refills:  0       potassium chloride SA 20 MEQ CR tablet   Commonly known as:  K-DUR/KLOR-CON M   Used for:  Combined systolic and diastolic congestive heart failure, unspecified congestive heart failure chronicity (H), Hypervolemia, unspecified hypervolemia type, Hospital discharge follow-up        Dose:  40 mEq   Take 2 tablets (40 mEq) by mouth daily   Quantity:  90 tablet   Refills:  0       predniSONE 10 MG tablet   Commonly known as:  DELTASONE   Used for:  Gout, unspecified cause, unspecified chronicity, unspecified site        Dose:  10 mg   Take 1 tablet (10 mg) by mouth daily   Quantity:  30 tablet   Refills:  1       sodium bicarbonate 650 MG tablet   Used for:  Chronic kidney disease, unspecified        TAKE 2 TABLETS(1300 MG) BY MOUTH THREE TIMES DAILY   Quantity:  180 tablet   Refills:  0            Where to get your medicines      Some of these will need a paper prescription and others can be bought over the counter. Ask your nurse if you have  questions.     Bring a paper prescription for each of these medications     oxyCODONE 5 MG IR tablet                Protect others around you: Learn how to safely use, store and throw away your medicines at www.disposemymeds.org.             Medication List: This is a list of all your medications and when to take them. Check marks below indicate your daily home schedule. Keep this list as a reference.      Medications           Morning Afternoon Evening Bedtime As Needed    acetaminophen 325 MG tablet   Commonly known as:  TYLENOL   Take 1-2 tablets (325-650 mg) by mouth every 4 hours as needed for mild pain Do not take more than 10 tablets in 24 hours                                albuterol 108 (90 BASE) MCG/ACT Inhaler   Commonly known as:  PROAIR HFA/PROVENTIL HFA/VENTOLIN HFA   Inhale 2 puffs into the lungs every 6 hours as needed for shortness of breath / dyspnea or wheezing                                allopurinol 100 MG tablet   Commonly known as:  ZYLOPRIM   Take 2 tablets (200 mg) by mouth daily Start with 150 mg daily for 2 weeks then go to 200 mg daily.                                aspirin 81 MG tablet   Take 1 tablet (81 mg) by mouth at bedtime                                atorvastatin 40 MG tablet   Commonly known as:  LIPITOR   Take 1 tablet (40 mg) by mouth daily                                calcitRIOL 0.25 MCG capsule   Commonly known as:  ROCALTROL   Take 1 capsule (0.25 mcg) by mouth daily                                darbepoetin emily 100 MCG/0.5ML injection   Commonly known as:  ARANESP (ALBUMIN FREE)   Inject 0.5 mLs (100 mcg) Subcutaneous every 14 days As needed for hgb<10g/dL.  If Hgb increases >1 point in 2 weeks (if blood transfusion given, use hgb PRIOR to this), SYSTOLIC BP > 180 mmHg or hgb>=10g/dL, HOLD DOSE. Dose must be within 1 week of Hgb.  Per anemia protocol with Brice Caraballo MD                                docusate sodium 100 MG capsule   Commonly known as:  COLACE    Take 1 capsule (100 mg) by mouth 2 times daily                                furosemide 80 MG tablet   Commonly known as:  LASIX   Take 1 tablet (80 mg) by mouth 2 times daily In the morning and at 3 PM                                glipiZIDE 5 MG tablet   Commonly known as:  GLUCOTROL   Take 1 tablet by mouth. TAKE 1 TABLET BY MOUTH DAILY BEFORE A MEAL                                hydrALAZINE 50 MG tablet   Commonly known as:  APRESOLINE   Take 1 tablet (50 mg) by mouth 3 times daily                                isosorbide mononitrate 60 MG 24 hr tablet   Commonly known as:  IMDUR   Take 1 tablet (60 mg) by mouth daily                                loratadine 10 MG tablet   Commonly known as:  CLARITIN   Take 10 mg by mouth daily as needed Reported on 5/3/2017                                losartan 100 MG tablet   Commonly known as:  COZAAR   Take 1 tablet (100 mg) by mouth daily                                metolazone 2.5 MG tablet   Commonly known as:  ZAROXOLYN   Take 1 tablet (2.5 mg) by mouth daily                                metoprolol 100 MG 24 hr tablet   Commonly known as:  TOPROL XL   Take 1 tablet (100 mg) by mouth daily                                omeprazole 20 MG CR capsule   Commonly known as:  priLOSEC   Take 20 mg by mouth daily                                oxyCODONE 5 MG IR tablet   Commonly known as:  ROXICODONE   Take 1 tablet (5 mg) by mouth every 6 hours as needed for pain maximum 4 tablet(s) per day                                potassium chloride SA 20 MEQ CR tablet   Commonly known as:  K-DUR/KLOR-CON M   Take 2 tablets (40 mEq) by mouth daily                                predniSONE 10 MG tablet   Commonly known as:  DELTASONE   Take 1 tablet (10 mg) by mouth daily                                sodium bicarbonate 650 MG tablet   TAKE 2 TABLETS(1300 MG) BY MOUTH THREE TIMES DAILY

## 2017-06-23 NOTE — PROGRESS NOTES
Spoke with Evelyn at [facility]. Faxed op note from PD catheter insertion yesterday. She will connect with unit to discuss first dressing change and follow up.    Carla Kyle RN

## 2017-06-26 ENCOUNTER — APPOINTMENT (OUTPATIENT)
Dept: GENERAL RADIOLOGY | Facility: CLINIC | Age: 62
DRG: 291 | End: 2017-06-26
Attending: EMERGENCY MEDICINE
Payer: COMMERCIAL

## 2017-06-26 ENCOUNTER — HOSPITAL ENCOUNTER (INPATIENT)
Facility: CLINIC | Age: 62
LOS: 4 days | Discharge: HOME | DRG: 291 | End: 2017-06-30
Attending: EMERGENCY MEDICINE | Admitting: INTERNAL MEDICINE
Payer: COMMERCIAL

## 2017-06-26 DIAGNOSIS — E87.70 HYPERVOLEMIA, UNSPECIFIED HYPERVOLEMIA TYPE: ICD-10-CM

## 2017-06-26 DIAGNOSIS — J18.9 PNEUMONIA OF RIGHT LUNG DUE TO INFECTIOUS ORGANISM, UNSPECIFIED PART OF LUNG: ICD-10-CM

## 2017-06-26 DIAGNOSIS — I50.20 SYSTOLIC CONGESTIVE HEART FAILURE, UNSPECIFIED CONGESTIVE HEART FAILURE CHRONICITY: ICD-10-CM

## 2017-06-26 DIAGNOSIS — E87.79 OTHER HYPERVOLEMIA: ICD-10-CM

## 2017-06-26 DIAGNOSIS — Z09 HOSPITAL DISCHARGE FOLLOW-UP: ICD-10-CM

## 2017-06-26 DIAGNOSIS — I50.40 COMBINED SYSTOLIC AND DIASTOLIC CONGESTIVE HEART FAILURE, UNSPECIFIED CONGESTIVE HEART FAILURE CHRONICITY: ICD-10-CM

## 2017-06-26 DIAGNOSIS — N18.5 CKD (CHRONIC KIDNEY DISEASE) STAGE 5, GFR LESS THAN 15 ML/MIN (H): ICD-10-CM

## 2017-06-26 DIAGNOSIS — I10 HYPERTENSION GOAL BP (BLOOD PRESSURE) < 130/80: Primary | ICD-10-CM

## 2017-06-26 DIAGNOSIS — R07.9 ACUTE CHEST PAIN: ICD-10-CM

## 2017-06-26 LAB
ANION GAP SERPL CALCULATED.3IONS-SCNC: 10 MMOL/L (ref 3–14)
ANION GAP SERPL CALCULATED.3IONS-SCNC: 15 MMOL/L (ref 3–14)
BASOPHILS # BLD AUTO: 0 10E9/L (ref 0–0.2)
BASOPHILS NFR BLD AUTO: 0.1 %
BUN SERPL-MCNC: 112 MG/DL (ref 7–30)
BUN SERPL-MCNC: 116 MG/DL (ref 7–30)
CALCIUM SERPL-MCNC: 8.1 MG/DL (ref 8.5–10.1)
CALCIUM SERPL-MCNC: 8.5 MG/DL (ref 8.5–10.1)
CHLORIDE SERPL-SCNC: 109 MMOL/L (ref 94–109)
CHLORIDE SERPL-SCNC: 110 MMOL/L (ref 94–109)
CO2 SERPL-SCNC: 20 MMOL/L (ref 20–32)
CO2 SERPL-SCNC: 25 MMOL/L (ref 20–32)
CREAT SERPL-MCNC: 4.9 MG/DL (ref 0.66–1.25)
CREAT SERPL-MCNC: 5.1 MG/DL (ref 0.66–1.25)
DIFFERENTIAL METHOD BLD: ABNORMAL
EOSINOPHIL # BLD AUTO: 0.1 10E9/L (ref 0–0.7)
EOSINOPHIL NFR BLD AUTO: 1 %
ERYTHROCYTE [DISTWIDTH] IN BLOOD BY AUTOMATED COUNT: 16.7 % (ref 10–15)
GFR SERPL CREATININE-BSD FRML MDRD: 12 ML/MIN/1.7M2
GFR SERPL CREATININE-BSD FRML MDRD: 12 ML/MIN/1.7M2
GLUCOSE BLDC GLUCOMTR-MCNC: 146 MG/DL (ref 70–99)
GLUCOSE BLDC GLUCOMTR-MCNC: 195 MG/DL (ref 70–99)
GLUCOSE BLDC GLUCOMTR-MCNC: 97 MG/DL (ref 70–99)
GLUCOSE SERPL-MCNC: 182 MG/DL (ref 70–99)
GLUCOSE SERPL-MCNC: 222 MG/DL (ref 70–99)
HCT VFR BLD AUTO: 30.2 % (ref 40–53)
HGB BLD-MCNC: 9.4 G/DL (ref 13.3–17.7)
IMM GRANULOCYTES # BLD: 0 10E9/L (ref 0–0.4)
IMM GRANULOCYTES NFR BLD: 0.5 %
INTERPRETATION ECG - MUSE: NORMAL
LYMPHOCYTES # BLD AUTO: 0.4 10E9/L (ref 0.8–5.3)
LYMPHOCYTES NFR BLD AUTO: 5.2 %
MAGNESIUM SERPL-MCNC: 1.5 MG/DL (ref 1.6–2.3)
MCH RBC QN AUTO: 29.1 PG (ref 26.5–33)
MCHC RBC AUTO-ENTMCNC: 31.1 G/DL (ref 31.5–36.5)
MCV RBC AUTO: 94 FL (ref 78–100)
MONOCYTES # BLD AUTO: 0.2 10E9/L (ref 0–1.3)
MONOCYTES NFR BLD AUTO: 2.4 %
NEUTROPHILS # BLD AUTO: 7.6 10E9/L (ref 1.6–8.3)
NEUTROPHILS NFR BLD AUTO: 90.8 %
NRBC # BLD AUTO: 0.1 10*3/UL
NRBC BLD AUTO-RTO: 1 /100
NT-PROBNP SERPL-MCNC: ABNORMAL PG/ML (ref 0–900)
PLATELET # BLD AUTO: 203 10E9/L (ref 150–450)
POTASSIUM SERPL-SCNC: 4 MMOL/L (ref 3.4–5.3)
POTASSIUM SERPL-SCNC: 4.3 MMOL/L (ref 3.4–5.3)
PROCALCITONIN SERPL-MCNC: 0.14 NG/ML
RBC # BLD AUTO: 3.23 10E12/L (ref 4.4–5.9)
SODIUM SERPL-SCNC: 144 MMOL/L (ref 133–144)
SODIUM SERPL-SCNC: 145 MMOL/L (ref 133–144)
TROPONIN I SERPL-MCNC: 0.15 UG/L (ref 0–0.04)
TROPONIN I SERPL-MCNC: 0.17 UG/L (ref 0–0.04)
TROPONIN I SERPL-MCNC: 0.18 UG/L (ref 0–0.04)
WBC # BLD AUTO: 8.3 10E9/L (ref 4–11)

## 2017-06-26 PROCEDURE — 93005 ELECTROCARDIOGRAM TRACING: CPT | Performed by: EMERGENCY MEDICINE

## 2017-06-26 PROCEDURE — 84145 PROCALCITONIN (PCT): CPT | Performed by: STUDENT IN AN ORGANIZED HEALTH CARE EDUCATION/TRAINING PROGRAM

## 2017-06-26 PROCEDURE — 25000132 ZZH RX MED GY IP 250 OP 250 PS 637: Performed by: STUDENT IN AN ORGANIZED HEALTH CARE EDUCATION/TRAINING PROGRAM

## 2017-06-26 PROCEDURE — 25000128 H RX IP 250 OP 636: Performed by: STUDENT IN AN ORGANIZED HEALTH CARE EDUCATION/TRAINING PROGRAM

## 2017-06-26 PROCEDURE — 36415 COLL VENOUS BLD VENIPUNCTURE: CPT | Performed by: STUDENT IN AN ORGANIZED HEALTH CARE EDUCATION/TRAINING PROGRAM

## 2017-06-26 PROCEDURE — 85025 COMPLETE CBC W/AUTO DIFF WBC: CPT | Performed by: EMERGENCY MEDICINE

## 2017-06-26 PROCEDURE — 96375 TX/PRO/DX INJ NEW DRUG ADDON: CPT | Performed by: EMERGENCY MEDICINE

## 2017-06-26 PROCEDURE — 25000128 H RX IP 250 OP 636: Performed by: EMERGENCY MEDICINE

## 2017-06-26 PROCEDURE — S0169 CALCITROL: HCPCS | Performed by: STUDENT IN AN ORGANIZED HEALTH CARE EDUCATION/TRAINING PROGRAM

## 2017-06-26 PROCEDURE — 83880 ASSAY OF NATRIURETIC PEPTIDE: CPT | Performed by: EMERGENCY MEDICINE

## 2017-06-26 PROCEDURE — 99285 EMERGENCY DEPT VISIT HI MDM: CPT | Mod: Z6 | Performed by: EMERGENCY MEDICINE

## 2017-06-26 PROCEDURE — 21400006 ZZH R&B CCU INTERMEDIATE UMMC

## 2017-06-26 PROCEDURE — 25000128 H RX IP 250 OP 636: Performed by: INTERNAL MEDICINE

## 2017-06-26 PROCEDURE — 83735 ASSAY OF MAGNESIUM: CPT | Performed by: STUDENT IN AN ORGANIZED HEALTH CARE EDUCATION/TRAINING PROGRAM

## 2017-06-26 PROCEDURE — 99232 SBSQ HOSP IP/OBS MODERATE 35: CPT | Mod: GC | Performed by: INTERNAL MEDICINE

## 2017-06-26 PROCEDURE — 25000125 ZZHC RX 250: Performed by: STUDENT IN AN ORGANIZED HEALTH CARE EDUCATION/TRAINING PROGRAM

## 2017-06-26 PROCEDURE — 25000132 ZZH RX MED GY IP 250 OP 250 PS 637: Performed by: EMERGENCY MEDICINE

## 2017-06-26 PROCEDURE — 87040 BLOOD CULTURE FOR BACTERIA: CPT | Performed by: EMERGENCY MEDICINE

## 2017-06-26 PROCEDURE — 84484 ASSAY OF TROPONIN QUANT: CPT | Performed by: EMERGENCY MEDICINE

## 2017-06-26 PROCEDURE — 71010 XR CHEST PORT 1 VW: CPT

## 2017-06-26 PROCEDURE — 99285 EMERGENCY DEPT VISIT HI MDM: CPT | Mod: 25 | Performed by: EMERGENCY MEDICINE

## 2017-06-26 PROCEDURE — 84484 ASSAY OF TROPONIN QUANT: CPT | Performed by: STUDENT IN AN ORGANIZED HEALTH CARE EDUCATION/TRAINING PROGRAM

## 2017-06-26 PROCEDURE — 96365 THER/PROPH/DIAG IV INF INIT: CPT | Performed by: EMERGENCY MEDICINE

## 2017-06-26 PROCEDURE — 25000131 ZZH RX MED GY IP 250 OP 636 PS 637: Performed by: STUDENT IN AN ORGANIZED HEALTH CARE EDUCATION/TRAINING PROGRAM

## 2017-06-26 PROCEDURE — 80048 BASIC METABOLIC PNL TOTAL CA: CPT | Performed by: EMERGENCY MEDICINE

## 2017-06-26 PROCEDURE — 00000146 ZZHCL STATISTIC GLUCOSE BY METER IP

## 2017-06-26 PROCEDURE — 80048 BASIC METABOLIC PNL TOTAL CA: CPT | Performed by: STUDENT IN AN ORGANIZED HEALTH CARE EDUCATION/TRAINING PROGRAM

## 2017-06-26 PROCEDURE — 99223 1ST HOSP IP/OBS HIGH 75: CPT | Mod: GC | Performed by: INTERNAL MEDICINE

## 2017-06-26 RX ORDER — NICOTINE POLACRILEX 4 MG
15-30 LOZENGE BUCCAL
Status: DISCONTINUED | OUTPATIENT
Start: 2017-06-26 | End: 2017-06-30 | Stop reason: HOSPADM

## 2017-06-26 RX ORDER — DOCUSATE SODIUM 100 MG/1
100 CAPSULE, LIQUID FILLED ORAL 2 TIMES DAILY
Status: DISCONTINUED | OUTPATIENT
Start: 2017-06-26 | End: 2017-06-30 | Stop reason: HOSPADM

## 2017-06-26 RX ORDER — FUROSEMIDE 10 MG/ML
100 INJECTION INTRAMUSCULAR; INTRAVENOUS ONCE
Status: COMPLETED | OUTPATIENT
Start: 2017-06-26 | End: 2017-06-26

## 2017-06-26 RX ORDER — POTASSIUM CHLORIDE 750 MG/1
40 TABLET, EXTENDED RELEASE ORAL DAILY
Status: DISCONTINUED | OUTPATIENT
Start: 2017-06-26 | End: 2017-06-30 | Stop reason: HOSPADM

## 2017-06-26 RX ORDER — ACETAMINOPHEN 325 MG/1
325-650 TABLET ORAL EVERY 4 HOURS PRN
Status: DISCONTINUED | OUTPATIENT
Start: 2017-06-26 | End: 2017-06-30 | Stop reason: HOSPADM

## 2017-06-26 RX ORDER — FUROSEMIDE 10 MG/ML
80 INJECTION INTRAMUSCULAR; INTRAVENOUS ONCE
Status: COMPLETED | OUTPATIENT
Start: 2017-06-26 | End: 2017-06-26

## 2017-06-26 RX ORDER — FUROSEMIDE 10 MG/ML
40 INJECTION INTRAMUSCULAR; INTRAVENOUS ONCE
Status: COMPLETED | OUTPATIENT
Start: 2017-06-26 | End: 2017-06-26

## 2017-06-26 RX ORDER — HYDRALAZINE HYDROCHLORIDE 50 MG/1
50 TABLET, FILM COATED ORAL 3 TIMES DAILY
Status: DISCONTINUED | OUTPATIENT
Start: 2017-06-26 | End: 2017-06-30 | Stop reason: HOSPADM

## 2017-06-26 RX ORDER — ALBUTEROL SULFATE 90 UG/1
2 AEROSOL, METERED RESPIRATORY (INHALATION) EVERY 6 HOURS PRN
Status: DISCONTINUED | OUTPATIENT
Start: 2017-06-26 | End: 2017-06-30 | Stop reason: HOSPADM

## 2017-06-26 RX ORDER — ISOSORBIDE MONONITRATE 60 MG/1
60 TABLET, EXTENDED RELEASE ORAL DAILY
Status: DISCONTINUED | OUTPATIENT
Start: 2017-06-26 | End: 2017-06-30 | Stop reason: HOSPADM

## 2017-06-26 RX ORDER — MAGNESIUM SULFATE HEPTAHYDRATE 40 MG/ML
4 INJECTION, SOLUTION INTRAVENOUS ONCE
Status: COMPLETED | OUTPATIENT
Start: 2017-06-26 | End: 2017-06-26

## 2017-06-26 RX ORDER — LEVOFLOXACIN 5 MG/ML
750 INJECTION, SOLUTION INTRAVENOUS ONCE
Status: COMPLETED | OUTPATIENT
Start: 2017-06-26 | End: 2017-06-26

## 2017-06-26 RX ORDER — ASPIRIN 81 MG/1
324 TABLET, CHEWABLE ORAL ONCE
Status: COMPLETED | OUTPATIENT
Start: 2017-06-26 | End: 2017-06-26

## 2017-06-26 RX ORDER — METOPROLOL SUCCINATE 100 MG/1
100 TABLET, EXTENDED RELEASE ORAL DAILY
Status: DISCONTINUED | OUTPATIENT
Start: 2017-06-26 | End: 2017-06-30 | Stop reason: HOSPADM

## 2017-06-26 RX ORDER — ALLOPURINOL 100 MG/1
200 TABLET ORAL DAILY
Status: DISCONTINUED | OUTPATIENT
Start: 2017-06-26 | End: 2017-06-30 | Stop reason: HOSPADM

## 2017-06-26 RX ORDER — HYDRALAZINE HYDROCHLORIDE 20 MG/ML
10 INJECTION INTRAMUSCULAR; INTRAVENOUS ONCE
Status: COMPLETED | OUTPATIENT
Start: 2017-06-26 | End: 2017-06-26

## 2017-06-26 RX ORDER — ASPIRIN 81 MG/1
81 TABLET, CHEWABLE ORAL DAILY
Status: DISCONTINUED | OUTPATIENT
Start: 2017-06-26 | End: 2017-06-30 | Stop reason: HOSPADM

## 2017-06-26 RX ORDER — LOSARTAN POTASSIUM 50 MG/1
100 TABLET ORAL DAILY
Status: DISCONTINUED | OUTPATIENT
Start: 2017-06-26 | End: 2017-06-30 | Stop reason: HOSPADM

## 2017-06-26 RX ORDER — MEROPENEM 500 MG/1
500 INJECTION, POWDER, FOR SOLUTION INTRAVENOUS ONCE
Status: COMPLETED | OUTPATIENT
Start: 2017-06-26 | End: 2017-06-26

## 2017-06-26 RX ORDER — SODIUM BICARBONATE 650 MG/1
1300 TABLET ORAL 3 TIMES DAILY
Status: DISCONTINUED | OUTPATIENT
Start: 2017-06-26 | End: 2017-06-30 | Stop reason: HOSPADM

## 2017-06-26 RX ORDER — ATORVASTATIN CALCIUM 40 MG/1
40 TABLET, FILM COATED ORAL DAILY
Status: DISCONTINUED | OUTPATIENT
Start: 2017-06-26 | End: 2017-06-30 | Stop reason: HOSPADM

## 2017-06-26 RX ORDER — PREDNISONE 10 MG/1
10 TABLET ORAL DAILY
Status: DISCONTINUED | OUTPATIENT
Start: 2017-06-26 | End: 2017-06-30 | Stop reason: HOSPADM

## 2017-06-26 RX ORDER — LIDOCAINE 40 MG/G
CREAM TOPICAL
Status: DISCONTINUED | OUTPATIENT
Start: 2017-06-26 | End: 2017-06-30 | Stop reason: HOSPADM

## 2017-06-26 RX ORDER — METOLAZONE 2.5 MG/1
2.5 TABLET ORAL ONCE
Status: COMPLETED | OUTPATIENT
Start: 2017-06-26 | End: 2017-06-26

## 2017-06-26 RX ORDER — CALCITRIOL 0.25 UG/1
0.25 CAPSULE, LIQUID FILLED ORAL DAILY
Status: DISCONTINUED | OUTPATIENT
Start: 2017-06-26 | End: 2017-06-30 | Stop reason: HOSPADM

## 2017-06-26 RX ORDER — DEXTROSE MONOHYDRATE 25 G/50ML
25-50 INJECTION, SOLUTION INTRAVENOUS
Status: DISCONTINUED | OUTPATIENT
Start: 2017-06-26 | End: 2017-06-30 | Stop reason: HOSPADM

## 2017-06-26 RX ADMIN — FUROSEMIDE 80 MG: 10 INJECTION, SOLUTION INTRAVENOUS at 06:30

## 2017-06-26 RX ADMIN — INSULIN ASPART 1 UNITS: 100 INJECTION, SOLUTION INTRAVENOUS; SUBCUTANEOUS at 18:08

## 2017-06-26 RX ADMIN — ASPIRIN 81 MG 324 MG: 81 TABLET ORAL at 02:08

## 2017-06-26 RX ADMIN — SODIUM BICARBONATE 650 MG TABLET 1300 MG: at 07:54

## 2017-06-26 RX ADMIN — ALLOPURINOL 200 MG: 100 TABLET ORAL at 07:54

## 2017-06-26 RX ADMIN — HYDRALAZINE HYDROCHLORIDE 50 MG: 50 TABLET ORAL at 20:57

## 2017-06-26 RX ADMIN — LOSARTAN POTASSIUM 100 MG: 50 TABLET, FILM COATED ORAL at 07:54

## 2017-06-26 RX ADMIN — OMEPRAZOLE 20 MG: 20 CAPSULE, DELAYED RELEASE ORAL at 07:56

## 2017-06-26 RX ADMIN — HYDRALAZINE HYDROCHLORIDE 10 MG: 20 INJECTION INTRAMUSCULAR; INTRAVENOUS at 06:29

## 2017-06-26 RX ADMIN — CALCITRIOL 0.25 MCG: 0.25 CAPSULE, LIQUID FILLED ORAL at 07:54

## 2017-06-26 RX ADMIN — SODIUM BICARBONATE 650 MG TABLET 1300 MG: at 20:57

## 2017-06-26 RX ADMIN — FUROSEMIDE 40 MG: 10 INJECTION, SOLUTION INTRAVENOUS at 03:49

## 2017-06-26 RX ADMIN — HYDRALAZINE HYDROCHLORIDE 50 MG: 50 TABLET ORAL at 13:11

## 2017-06-26 RX ADMIN — HYDRALAZINE HYDROCHLORIDE 50 MG: 50 TABLET ORAL at 07:55

## 2017-06-26 RX ADMIN — ISOSORBIDE MONONITRATE 60 MG: 60 TABLET, EXTENDED RELEASE ORAL at 07:56

## 2017-06-26 RX ADMIN — FUROSEMIDE 100 MG: 10 INJECTION, SOLUTION INTRAVENOUS at 13:12

## 2017-06-26 RX ADMIN — MEROPENEM 500 MG: 500 INJECTION, POWDER, FOR SOLUTION INTRAVENOUS at 05:40

## 2017-06-26 RX ADMIN — LEVOFLOXACIN 750 MG: 5 INJECTION, SOLUTION INTRAVENOUS at 03:50

## 2017-06-26 RX ADMIN — ACETAMINOPHEN 650 MG: 325 TABLET, FILM COATED ORAL at 07:02

## 2017-06-26 RX ADMIN — METOLAZONE 2.5 MG: 2.5 TABLET ORAL at 13:11

## 2017-06-26 RX ADMIN — MAGNESIUM SULFATE IN WATER 4 G: 40 INJECTION, SOLUTION INTRAVENOUS at 20:57

## 2017-06-26 RX ADMIN — PREDNISONE 10 MG: 10 TABLET ORAL at 07:54

## 2017-06-26 RX ADMIN — DOCUSATE SODIUM 100 MG: 100 CAPSULE, LIQUID FILLED ORAL at 20:57

## 2017-06-26 RX ADMIN — SODIUM BICARBONATE 650 MG TABLET 1300 MG: at 13:11

## 2017-06-26 RX ADMIN — ATORVASTATIN CALCIUM 40 MG: 40 TABLET, FILM COATED ORAL at 07:54

## 2017-06-26 RX ADMIN — POTASSIUM CHLORIDE 40 MEQ: 750 TABLET, EXTENDED RELEASE ORAL at 07:56

## 2017-06-26 RX ADMIN — METOPROLOL SUCCINATE 100 MG: 100 TABLET, EXTENDED RELEASE ORAL at 07:55

## 2017-06-26 ASSESSMENT — ENCOUNTER SYMPTOMS
FEVER: 0
MYALGIAS: 0
SHORTNESS OF BREATH: 1
DIZZINESS: 0
CHEST TIGHTNESS: 0
NAUSEA: 0
CONFUSION: 0
TROUBLE SWALLOWING: 0
WEAKNESS: 0
FATIGUE: 1
JOINT SWELLING: 0
VOICE CHANGE: 0
RHINORRHEA: 0
SINUS PRESSURE: 0
SORE THROAT: 1
HEADACHES: 0
BRUISES/BLEEDS EASILY: 0
VOMITING: 0
ABDOMINAL PAIN: 0
COUGH: 1
ARTHRALGIAS: 0
CHILLS: 0
DIAPHORESIS: 0
DIARRHEA: 0
PALPITATIONS: 0

## 2017-06-26 NOTE — PHARMACY-ADMISSION MEDICATION HISTORY
Admission medication history interview status for the 6/26/2017 admission is complete. See Epic admission navigator for allergy information, pharmacy, prior to admission medications and immunization status.     Medication history interview sources:  Patient    Changes made to PTA medication list (reason)  Added: N/A  Deleted: Oxycodone 5 mg IR tablet -- makes patient vomit, hasn't taken in months  Changed:   - Docusate Sodium 100 mg capsule, take 1 capsule by mouth BID --> take 1 capsule by mouth BID PRN (takes once in awhile)  - Omeprazole 20 mg CR capsule, take 20 mg by mouth daily --> take 20 mg by mouth daily at night (patient said he takes before bed and not in the morning)  - Potassium Chloride 20 mEq Cr tablet, take 2 tablets by mouth daily --> Take 20 mEq by mouth 2 times daily 1 tablet in the morning, 1 tablet at 3 PM    Additional medication history information (including reliability of information, actions taken by pharmacist):  - Talked to patient who is very knowledgeable about what medications he takes  - He doesn't take oxycodone anymore because it makes him vomit  - Pt said he takes his omeprazole at night instead of in the morning. He was a little worried about taking too much because he took 1 tablet last night, and then his nurse gave him another dose this morning.   - He changed the way he takes his potassium supplement from 2 tablets once daily to 1 tablet twice daily because it was too hard to swallow the large tablets. He takes 1 tablet in the morning and one around 3 PM.      Prior to Admission medications    Medication Sig Last Dose Taking? Auth Provider   sodium bicarbonate 650 MG tablet TAKE 2 TABLETS(1300 MG) BY MOUTH THREE TIMES DAILY 6/26/2017 at 0800 Yes Gilles Valentine MD   allopurinol (ZYLOPRIM) 100 MG tablet Take 2 tablets (200 mg) by mouth daily Start with 150 mg daily for 2 weeks then go to 200 mg daily. 6/26/2017 at 0800 Yes Darvin Tang MD   predniSONE  (DELTASONE) 10 MG tablet Take 1 tablet (10 mg) by mouth daily 6/26/2017 at 0800 Yes Darvin Tang MD   potassium chloride SA (K-DUR/KLOR-CON M) 20 MEQ CR tablet Take 2 tablets (40 mEq) by mouth daily  Patient taking differently: Take 20 mEq by mouth 2 times daily 1 tablet in the morning, 1 tablet at 3 PM 6/26/2017 at 0800 Yes Brice Caraballo MD   calcitRIOL (ROCALTROL) 0.25 MCG capsule Take 1 capsule (0.25 mcg) by mouth daily 6/26/2017 at 0800 Yes Brice Caraballo MD   metolazone (ZAROXOLYN) 2.5 MG tablet Take 1 tablet (2.5 mg) by mouth daily 6/25/2017 at AM Yes Yahir Turcios MD   glipiZIDE (GLUCOTROL) 5 MG tablet Take 1 tablet by mouth. TAKE 1 TABLET BY MOUTH DAILY BEFORE A MEAL 6/25/2017 at AM Yes Yahir Turcios MD   atorvastatin (LIPITOR) 40 MG tablet Take 1 tablet (40 mg) by mouth daily 6/26/2017 at 0800 Yes Leia De Leon APRN CNP   isosorbide mononitrate (IMDUR) 60 MG 24 hr tablet Take 1 tablet (60 mg) by mouth daily 6/26/2017 at 0800 Yes Leia De Leon APRN CNP   metoprolol (TOPROL XL) 100 MG 24 hr tablet Take 1 tablet (100 mg) by mouth daily 6/26/2017 at 0800 Yes Leia De Leon APRN CNP   losartan (COZAAR) 100 MG tablet Take 1 tablet (100 mg) by mouth daily 6/26/2017 at 0800 Yes Leia De Leon APRN CNP   hydrALAZINE (APRESOLINE) 50 MG tablet Take 1 tablet (50 mg) by mouth 3 times daily 6/26/2017 at 0630 Yes Leia De Leon APRN CNP   acetaminophen (TYLENOL) 325 MG tablet Take 1-2 tablets (325-650 mg) by mouth every 4 hours as needed for mild pain Do not take more than 10 tablets in 24 hours 6/26/2017 at 0700 Yes Km Muniz MD   furosemide (LASIX) 80 MG tablet Take 1 tablet (80 mg) by mouth 2 times daily In the morning and at 3 PM 6/26/2017 at 0630 Yes Km uMniz MD   omeprazole (PRILOSEC) 20 MG CR capsule Take 20 mg by mouth daily At night 6/26/2017 at 0800 Yes Unknown, Entered By History   loratadine (CLARITIN) 10 MG tablet  Take 10 mg by mouth daily as needed Reported on 5/3/2017 6/25/2017 at PM Yes Reported, Patient   ASPIRIN 81 MG OR TABS Take 1 tablet (81 mg) by mouth at bedtime 6/25/2017 at PM Yes Reported, Patient   docusate sodium (COLACE) 100 MG capsule Take 1 capsule (100 mg) by mouth 2 times daily  Patient taking differently: Take 100 mg by mouth 2 times daily as needed  6/23/2017  Km Muniz MD   darbepoetin emily (ARANESP, ALBUMIN FREE,) 100 MCG/0.5ML injection Inject 0.5 mLs (100 mcg) Subcutaneous every 14 days As needed for hgb<10g/dL.  If Hgb increases >1 point in 2 weeks (if blood transfusion given, use hgb PRIOR to this), SYSTOLIC BP > 180 mmHg or hgb>=10g/dL, HOLD DOSE. Dose must be within 1 week of Hgb.  Per anemia protocol with Brice Caraballo MD 6/21/2017  Brice Caraballo MD   albuterol (PROAIR HFA/PROVENTIL HFA/VENTOLIN HFA) 108 (90 BASE) MCG/ACT Inhaler Inhale 2 puffs into the lungs every 6 hours as needed for shortness of breath / dyspnea or wheezing 6/21/2017 at AM  Amelie Cross PA-C         Medication history completed by: Kyleigh Velazco, PharmD Student

## 2017-06-26 NOTE — ED NOTES
Right chest pain half an hour ago, not at this moment. Checked B/P and was high the -158 and DB/P 93, left wrist noticed pulsation. Concerned and called nurse line, advised to come to ED.
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
## 2017-06-26 NOTE — PROGRESS NOTES
Pt VSS; paced rhythm with HR in the 90s; RA with sats in the upper 90s. No complaints of chest pain or discomfort. Troponin levels reordered at 1500; awaiting results. PD to be changed Thursday; dressing with scant amount of dried drainage. Good UO; up independently to void. BS covered with SSI. HF teaching material at bedside. Continue to monitor and notify team with changes.

## 2017-06-26 NOTE — ED PROVIDER NOTES
History     Chief Complaint   Patient presents with     Chest Pain     Pharyngitis     Hypertension     HPI  Murray Nicholson is a 62 year old male with history of coronary artery disease, CHF with EF of 20-25%, CKD, hypertension, and type 2 diabetes who presents with chest pain and dyspnea. Right side chest pain of 45 minute duration describes as an ache. Associated dyspnea and orthopnea. Some cough. No fever or chills. Peritoneal catheter placed 4 days ago but not yet used. Mild leg swelling. No leg pain. He does produce urine. He was admitted for decompensated heart failure approximately 2 months ago. He is now pain free.    I have reviewed the Medications, Allergies, Past Medical and Surgical History, and Social History in the [employer] system.  Past Medical History:   Diagnosis Date     (HFpEF) heart failure with preserved ejection fraction (H)      Allergic rhinitis, cause unspecified      Anemia of chronic kidney failure      Ascending aortic aneurysm (H)      Bicuspid aortic valve      CAD (coronary artery disease)      Chronic kidney disease, stage 5 (H)      Congestive heart failure, unspecified      Dyslipidemia      Esophageal reflux      Hypersomnia with sleep apnea, unspecified      Hypertension      MGUS (monoclonal gammopathy of unknown significance)      SHEELA (obstructive sleep apnea)      Type 2 diabetes mellitus (H)        Review of Systems   Constitutional: Positive for fatigue. Negative for chills, diaphoresis and fever.   HENT: Positive for sore throat. Negative for congestion, rhinorrhea, sinus pressure, trouble swallowing and voice change.    Eyes: Negative for visual disturbance.   Respiratory: Positive for cough and shortness of breath. Negative for chest tightness.    Cardiovascular: Positive for chest pain and leg swelling. Negative for palpitations.   Gastrointestinal: Negative for abdominal pain, diarrhea, nausea and vomiting.   Genitourinary:        New peritoneal catheter not yet used.       Musculoskeletal: Negative for arthralgias, joint swelling and myalgias.   Skin: Negative for rash.   Allergic/Immunologic: Negative for immunocompromised state.   Neurological: Negative for dizziness, syncope, weakness and headaches.   Hematological: Does not bruise/bleed easily.   Psychiatric/Behavioral: Negative for confusion.       Physical Exam   BP: 151/88  Pulse: 102  Temp: 97.5  F (36.4  C)  Resp: 18  SpO2: 99 %  Physical Exam   Constitutional: He appears well-developed and well-nourished. No distress.   HENT:   Head: Normocephalic and atraumatic.   Mouth/Throat: Oropharynx is clear and moist.   Eyes: EOM are normal. Pupils are equal, round, and reactive to light.   Conjunctival pallor   Neck: Normal range of motion. Neck supple.   Cardiovascular: Normal rate, regular rhythm and intact distal pulses.    Pulmonary/Chest: Effort normal. No respiratory distress. He has rales.   Abdominal: Soft. There is no tenderness.   Musculoskeletal: He exhibits edema. He exhibits no tenderness.   Neurological: He is alert.   Skin: Skin is warm and dry.   Psychiatric: He has a normal mood and affect. His behavior is normal.   Nursing note and vitals reviewed.      ED Course     ED Course     Procedures             EKG Interpretation:      Interpreted by Ti Pagan  Time reviewed: 0152  Symptoms at time of EKG: short of breath   Rhythm: normal sinus   Rate: Normal  Axis: Left Axis Deviation  Ectopy: none  Conduction: LVH, QRS widening  ST Segments/ T Waves: No acute ischemic changes  Q Waves: none  Comparison to prior: Unchanged    Clinical Impression: sinus rhythm, left axis deviation, LVH, QRS widening                Critical Care time:  none               Labs Ordered and Resulted from Time of ED Arrival Up to the Time of Departure from the ED   CBC WITH PLATELETS DIFFERENTIAL - Abnormal; Notable for the following:        Result Value    RBC Count 3.23 (*)     Hemoglobin 9.4 (*)     Hematocrit 30.2 (*)     MCHC 31.1  (*)     RDW 16.7 (*)     Nucleated RBCs 1 (*)     Absolute Lymphocytes 0.4 (*)     All other components within normal limits   BASIC METABOLIC PANEL - Abnormal; Notable for the following:     Sodium 145 (*)     Chloride 110 (*)     Anion Gap 15 (*)     Glucose 222 (*)     Urea Nitrogen 116 (*)     Creatinine 5.10 (*)     GFR Estimate 12 (*)     GFR Estimate If Black 14 (*)     Calcium 8.1 (*)     All other components within normal limits   TROPONIN I - Abnormal; Notable for the following:     Troponin I ES 0.147 (*)     All other components within normal limits   NT PROBNP INPATIENT - Abnormal; Notable for the following:     N-Terminal Pro BNP Inpatient 559013 (*)     All other components within normal limits   ISTAT TROPONIN NURSING POCT   STRICT INTAKE AND OUTPUT   BLOOD CULTURE   BLOOD CULTURE     Medications   meropenem (MERREM) 500 mg vial to attach to  mL bag for ADULTS or 25 mL bag for PEDS (not administered)   levofloxacin (LEVAQUIN) infusion 750 mg (not administered)   furosemide (LASIX) injection 40 mg (not administered)   aspirin chewable tablet 324 mg (324 mg Oral Given 6/26/17 0208)   .       Assessments & Plan (with Medical Decision Making)   Multiple problems. Chest pain with elevated troponin. Need to evaluate for acute coronary disease. Chest XR suggests possible pneumonia. Blood cultures collected and will start IV antibiotics for possible brianne care associated pneumonia. Known CHF and elevated BNP with known EF of 20-25%. Given IV furosemide. Discussed with cardiology staff and will admit to a monitored bed.  I have reviewed the nursing notes.    I have reviewed the findings, diagnosis, plan and need for follow up with the patient.    New Prescriptions    No medications on file       Final diagnoses:   Acute chest pain   Pneumonia of right lung due to infectious organism, unspecified part of lung   Systolic congestive heart failure, unspecified congestive heart failure chronicity (H)   CKD  (chronic kidney disease) stage 5, GFR less than 15 ml/min (H)       6/26/2017   [facility], [place], EMERGENCY DEPARTMENT     Ti Aguilar MD  06/26/17 0328

## 2017-06-26 NOTE — PROGRESS NOTES
D: CAD, ICM, CKD 5, DM2, HTN, AAA, admitted with SOB right sided chest pain    I: Monitored vitals and assessed pt status.   Changed:-  Running:-  PRN:-    A: A0x4. VSS. Afebrile. HF exacerbation post PD cath placement.  Pt calm and cooperative. Trops elevated but stable. (0.14-.18). Lasix given with adequate urine output. Blood cultures collected this AM.     P: Continue to monitor Pt status and report changes to treatment team.

## 2017-06-26 NOTE — IP AVS SNAPSHOT
MRN:[id number]                      After Visit Summary   6/26/2017    Murray Nicholson    MRN: [id number]           Thank you!     Thank you for choosing [facility] for your care. Our goal is always to provide you with excellent care. Hearing back from our patients is one way we can continue to improve our services. Please take a few minutes to complete the written survey that you may receive in the mail after you visit with us. Thank you!        Patient Information     Date Of Birth          1955        Designated Caregiver       Most Recent Value    Caregiver    Will someone help with your care after discharge? no      About your hospital stay     You were admitted on:  June 26, 2017 You last received care in the:  [address] [facility]    You were discharged on:  June 30, 2017        Reason for your hospital stay       Atypical chest pain and mild heart failure exacerbation.                  Who to Call     For medical emergencies, please call 911.  For non-urgent questions about your medical care, please call your primary care provider or clinic, [phone]          Attending Provider     Provider Specialty    Ti Aguilar MD Emergency Medicine    [facility]       Primary Care Provider Office Phone # Fax #    Yahir Alex Turcios [phone]      After Care Instructions     Activity       Your activity upon discharge: activity as tolerated            Diet       Follow this diet upon discharge: Orders Placed This Encounter      Fluid restriction 2000 ML FLUID      2 Gram Sodium Diet            Discharge Instructions       1. Start your new diuretics as instructed.  2. Follow up as instructed above.    Return if you have any new or changed chest pain, increased shortness of breath, weight gain not controlled by your diuretics or as otherwise directed by your cardiologist.            Monitor and record       Weigh yourself daily, if your weight increases  more than 2lbs in one day or more than 5lbs in one week contact your CORE provider and take an extra dose of your diuretic.                  Follow-up Appointments     Adult [facility] Follow-up and recommended labs and tests       1. Follow up with primary care provider, Yahir Turcios, within 7 days for hospital follow- up.  The following labs/tests are recommended: BMP, Mg.  2. Follow up in CORE clinic in the next 1-2 weeks.  3. Follow up with nephrology as previously scheduled.    4. Follow up with Dr. Posada with cardiology as previously scheduled.    Appointments on [facility] and/or [facility] (with [facility] or [facility] provider or service). Call [phone] if you haven't heard regarding these appointments within 7 days of discharge.            Follow Up and recommended labs and tests       _____________________________________________________    [facility]   [address] [address]  Phone [phone]    Rescheduled appointment on 7/7/17 at 12:30 PM.  ________________________________________________________                  Your next 10 appointments already scheduled     Jul 05, 2017 12:30 PM CDT   Lab with  LAB    [facility] ([facility])    [address] [place] [id number] [phone]            Jul 05, 2017  1:00 PM CDT   (Arrive by 12:45 PM)   Post-Op with VERONICA Torres   [facility] ([facility])    [address] [place] [id number] [phone]            Jul 05, 2017  1:30 PM CDT   Nurse Visit with [facility] Nurse   [facility] ([facility])    [address] [place] [address] [id number] [phone]            Aug 02, 2017  1:00 PM CDT   Lab with  LAB   [facility] ([facility])    [address] [address]  "  [phone]            Aug 02, 2017  2:30 PM CDT   (Arrive by 2:00 PM)   Return Visit with Brice Caraballo MD   [facility]    [address] [facility]  [address] [facility] [address] [phone]              Additional Services     Medication Therapy Management Referral       Reason for referral:  on more than 10 medications and hospitalized or in the ED in the past 6 months     This service is designed to help you get the most from your medications.  A specially trained pharmacist will work closely with you and your doctors  to solve any problems related to your medications and to help you get the   best results from taking them.      The Medication Therapy Management staff will call you to schedule an appointment.                  General Recommendations To Control Heart Failure When You Get Home     Instructions To Patients and Families: Please read and check off each of these important instructions as you do them when you get home.           Weight and symptoms      ___ Put a scale in your bathroom  ___ Post a weight chart or calendar next to the scale  ___Weigh yourself every day as soon as you you get up in the morning. You should only be wearing your pajamas. Write your weight on the chart/calendar.  ___ Bring your weight chart/calendar with you to all appointments    ___Call your doctor if you gain 2 pounds in 1 day or 5 pounds in 1 week from your \"dry\" weight (baseline weight). Also call your doctor if you have shortness of breath that gets worse over time, leg swelling or fatigue.         Medicines and diet     ___ Make sure to take your medicines as prescribed.    ___Bring a current list of your medicines and all of your medicine bottles with you to all appointments.    ___ Limit fluids if you still have swelling or shortness of breath, or if your doctor tells you to do so.  ___ Eat less than 2000 mg of sodium (salt) every day. Read food labels, " and do not add salt to meals.   ___ Heart healthy diet with low fat and low cholesterol          Activity and suggested lifestyle changes    ___ Stay active. Talk to your doctor about an exercise program that is safe for your heart.    ___ Stop smoking. Reduce alcohol use.      ___ Lose weight if you are overweight. Extra weight puts a lot of stress on the heart.          Control for Leg Swelling   ___ Keep your legs elevated (raised) as needed for swelling. If swelling is uncomfortable or elevation doesn t help, ask your doctor about using ACE wrap or Jobst stockings.          Follow-up appointments   ___ Make a [facility] Clinic appointment with a basic metabolic panel lab draw 3 to 5 days after you leave the hospital. Call one of the following locations:   [facility]  [phone],  [facility] [phone],  [facility]  [phone].     ___ Make sure to take your medications as prescribed and bring an accurate list of your medications and your weight chart/calendar to your follow up appointment at the [facility].O.R.E. Clinic for continued education and adjustments          What is the CORE clinic?    The C.O.R.E (Cardiomyopathy, Optimization, Rehabilitation, Education) Clinic is a heart failure specialty clinic within the [facility]. At C.O.R.E., you will work with nurse practitioners to carefully adjust medicines, get education and learn who and when to call if symptoms appear. C.O.R.E nurses specialize in helping you:    better understand your disease.    slow the progress of your disease.    improve the length and quality of your life.    detect future heart problems before they become life threatening.    avoid hospital stays.            Pending Results     Date and Time Order Name Status Description    6/26/2017 0257 Blood culture Preliminary     6/26/2017 0257 Blood culture Preliminary      "        Statement of Approval     Ordered          06/30/17 1129  I have reviewed and agree with all the recommendations and orders detailed in this document.  EFFECTIVE NOW     Approved and electronically signed by:  Ragini Marks MD             Admission Information     Date & Time Department Dept. Phone    6/26/2017 [address] [facility] [phone]      Your Vitals Were     Blood Pressure Pulse Temperature Respirations Height Weight    146/89 (BP Location: Left arm) 86 98.4  F (36.9  C) (Oral) 16 1.753 m (5' 9\") 84 kg (185 lb 3.2 oz)    Pulse Oximetry BMI (Body Mass Index)                99% 27.35 kg/m2          MyChart Information     [employer] gives you secure access to your electronic health record. If you see a primary care provider, you can also send messages to your care team and make appointments. If you have questions, please call your primary care clinic.  If you do not have a primary care provider, please call [phone] and they will assist you.        Care EveryWhere ID     This is your Care EveryWhere ID. This could be used by other organizations to access your [facility] medical records  [id number]        Equal Access to Services     JOHN HAUSER AH: Aubrie Thomas, jose villa, jose fields. So [facility] [phone].    ATENCIÓN: Si habla español, tiene a ortiz disposición servicios gratuitos de asistencia lingüística. Keely al [phone].    We comply with applicable federal civil rights laws and [place] laws. We do not discriminate on the basis of race, color, national origin, age, disability sex, sexual orientation or gender identity.               Review of your medicines      START taking        Dose / Directions    amLODIPine 5 MG tablet   Commonly known as:  NORVASC   Used for:  Hypertension goal BP (blood pressure) < 130/80        Dose:  5 mg   Take 1 tablet (5 mg) by mouth daily   Quantity:  30 tablet "   Refills:  3       bumetanide 2 MG tablet   Commonly known as:  BUMEX   Used for:  Systolic congestive heart failure, unspecified congestive heart failure chronicity (H)        Dose:  6 mg   Take 3 tablets (6 mg) by mouth 2 times daily   Quantity:  180 tablet   Refills:  3         CONTINUE these medicines which may have CHANGED, or have new prescriptions. If we are uncertain of the size of tablets/capsules you have at home, strength may be listed as something that might have changed.        Dose / Directions    docusate sodium 100 MG capsule   Commonly known as:  COLACE   This may have changed:    - when to take this  - reasons to take this   Used for:  Constipation, unspecified constipation type        Dose:  100 mg   Take 1 capsule (100 mg) by mouth 2 times daily   Quantity:  60 capsule   Refills:  1       metolazone 2.5 MG tablet   Commonly known as:  ZAROXOLYN   This may have changed:  when to take this   Used for:  Other hypervolemia        Dose:  2.5 mg   Take 1 tablet (2.5 mg) by mouth every other day   Quantity:  90 tablet   Refills:  1       * potassium chloride SA 20 MEQ CR tablet   Commonly known as:  K-DUR/KLOR-CON M   This may have changed:  You were already taking a medication with the same name, and this prescription was added. Make sure you understand how and when to take each.   Used for:  Other hypervolemia        Dose:  40 mEq   Take 2 tablets (40 mEq) by mouth once for 1 dose   Quantity:  2 tablet   Refills:  0       * potassium chloride SA 20 MEQ CR tablet   Commonly known as:  K-DUR/KLOR-CON M   This may have changed:    - how much to take  - how to take this  - when to take this  - additional instructions   Used for:  Combined systolic and diastolic congestive heart failure, unspecified congestive heart failure chronicity (H), Hypervolemia, unspecified hypervolemia type, Hospital discharge follow-up        Take 40mEq in the morning (2 tabs) and 20mEq (1 tab) in the evening for a total of  60mEq daily.   Quantity:  90 tablet   Refills:  0       * Notice:  This list has 2 medication(s) that are the same as other medications prescribed for you. Read the directions carefully, and ask your doctor or other care provider to review them with you.      CONTINUE these medicines which have NOT CHANGED        Dose / Directions    acetaminophen 325 MG tablet   Commonly known as:  TYLENOL   Used for:  Gout, unspecified cause, unspecified chronicity, unspecified site        Dose:  325-650 mg   Take 1-2 tablets (325-650 mg) by mouth every 4 hours as needed for mild pain Do not take more than 10 tablets in 24 hours   Quantity:  100 tablet   Refills:  0       albuterol 108 (90 BASE) MCG/ACT Inhaler   Commonly known as:  PROAIR HFA/PROVENTIL HFA/VENTOLIN HFA   Used for:  Cough        Dose:  2 puff   Inhale 2 puffs into the lungs every 6 hours as needed for shortness of breath / dyspnea or wheezing   Quantity:  1 Inhaler   Refills:  0       allopurinol 100 MG tablet   Commonly known as:  ZYLOPRIM   Used for:  Gout, unspecified cause, unspecified chronicity, unspecified site        Dose:  200 mg   Take 2 tablets (200 mg) by mouth daily Start with 150 mg daily for 2 weeks then go to 200 mg daily.   Quantity:  90 tablet   Refills:  1       aspirin 81 MG tablet        Take 1 tablet (81 mg) by mouth at bedtime   Refills:  0       atorvastatin 40 MG tablet   Commonly known as:  LIPITOR   Used for:  CKD (chronic kidney disease) stage 3, GFR 30-59 ml/min, Hyperlipidemia LDL goal <100        Dose:  40 mg   Take 1 tablet (40 mg) by mouth daily   Quantity:  90 tablet   Refills:  3       calcitRIOL 0.25 MCG capsule   Commonly known as:  ROCALTROL   Used for:  Hypocalcemia        Dose:  0.25 mcg   Take 1 capsule (0.25 mcg) by mouth daily   Quantity:  90 capsule   Refills:  0       darbepoetin emily 100 MCG/0.5ML injection   Commonly known as:  ARANESP (ALBUMIN FREE)   Used for:  Anemia in stage 5 chronic kidney disease (H), CKD  (chronic kidney disease) stage 5, GFR less than 15 ml/min (H)        Dose:  100 mcg   Inject 0.5 mLs (100 mcg) Subcutaneous every 14 days As needed for hgb<10g/dL.  If Hgb increases >1 point in 2 weeks (if blood transfusion given, use hgb PRIOR to this), SYSTOLIC BP > 180 mmHg or hgb>=10g/dL, HOLD DOSE. Dose must be within 1 week of Hgb.  Per anemia protocol with Brice Caraballo MD   Quantity:  0.5 mL   Refills:  99       glipiZIDE 5 MG tablet   Commonly known as:  GLUCOTROL   Used for:  Type 2 diabetes mellitus with other specified complication (H)        Take 1 tablet by mouth. TAKE 1 TABLET BY MOUTH DAILY BEFORE A MEAL   Quantity:  90 tablet   Refills:  0       hydrALAZINE 50 MG tablet   Commonly known as:  APRESOLINE   Used for:  Type 2 diabetes mellitus with stage 5 chronic kidney disease not on chronic dialysis, without long-term current use of insulin (H)        Dose:  50 mg   Take 1 tablet (50 mg) by mouth 3 times daily   Quantity:  270 tablet   Refills:  3       isosorbide mononitrate 60 MG 24 hr tablet   Commonly known as:  IMDUR   Used for:  Chronic systolic congestive heart failure (H)        Dose:  60 mg   Take 1 tablet (60 mg) by mouth daily   Quantity:  90 tablet   Refills:  3       loratadine 10 MG tablet   Commonly known as:  CLARITIN   Used for:  Hypertensive cardiopathy, SOB (shortness of breath)        Dose:  10 mg   Take 10 mg by mouth daily as needed Reported on 5/3/2017   Refills:  0       losartan 100 MG tablet   Commonly known as:  COZAAR   Used for:  Renal hypertension, stage 1-4 or unspecified chronic kidney disease        Dose:  100 mg   Take 1 tablet (100 mg) by mouth daily   Quantity:  90 tablet   Refills:  0       metoprolol 100 MG 24 hr tablet   Commonly known as:  TOPROL XL   Used for:  Chronic systolic congestive heart failure (H)        Dose:  100 mg   Take 1 tablet (100 mg) by mouth daily   Quantity:  90 tablet   Refills:  3       omeprazole 20 MG CR capsule   Commonly known as:   priLOSEC        Dose:  20 mg   Take 20 mg by mouth daily At night   Refills:  0       predniSONE 10 MG tablet   Commonly known as:  DELTASONE   Used for:  Gout, unspecified cause, unspecified chronicity, unspecified site        Dose:  10 mg   Take 1 tablet (10 mg) by mouth daily   Quantity:  30 tablet   Refills:  1       sodium bicarbonate 650 MG tablet   Used for:  Chronic kidney disease, unspecified        TAKE 2 TABLETS(1300 MG) BY MOUTH THREE TIMES DAILY   Quantity:  180 tablet   Refills:  0         STOP taking     furosemide 80 MG tablet   Commonly known as:  LASIX                Where to get your medicines      These medications were sent to [id number] - [place], [place] - [address], [place] [address]     Phone:  [phone]     amLODIPine 5 MG tablet    bumetanide 2 MG tablet    metolazone 2.5 MG tablet    potassium chloride SA 20 MEQ CR tablet         Some of these will need a paper prescription and others can be bought over the counter. Ask your nurse if you have questions.     Bring a paper prescription for each of these medications     potassium chloride SA 20 MEQ CR tablet                Protect others around you: Learn how to safely use, store and throw away your medicines at www.disposemymeds.org.             Medication List: This is a list of all your medications and when to take them. Check marks below indicate your daily home schedule. Keep this list as a reference.      Medications           Morning Afternoon Evening Bedtime As Needed    acetaminophen 325 MG tablet   Commonly known as:  TYLENOL   Take 1-2 tablets (325-650 mg) by mouth every 4 hours as needed for mild pain Do not take more than 10 tablets in 24 hours   Last time this was given:  650 mg on 6/30/2017  1:31 AM                                albuterol 108 (90 BASE) MCG/ACT Inhaler   Commonly known as:  PROAIR HFA/PROVENTIL HFA/VENTOLIN HFA   Inhale 2 puffs into the  lungs every 6 hours as needed for shortness of breath / dyspnea or wheezing                                allopurinol 100 MG tablet   Commonly known as:  ZYLOPRIM   Take 2 tablets (200 mg) by mouth daily Start with 150 mg daily for 2 weeks then go to 200 mg daily.   Last time this was given:  200 mg on 6/30/2017  7:54 AM                                amLODIPine 5 MG tablet   Commonly known as:  NORVASC   Take 1 tablet (5 mg) by mouth daily   Last time this was given:  5 mg on 6/30/2017 11:26 AM                                aspirin 81 MG tablet   Take 1 tablet (81 mg) by mouth at bedtime                                atorvastatin 40 MG tablet   Commonly known as:  LIPITOR   Take 1 tablet (40 mg) by mouth daily   Last time this was given:  40 mg on 6/30/2017  7:54 AM                                bumetanide 2 MG tablet   Commonly known as:  BUMEX   Take 3 tablets (6 mg) by mouth 2 times daily   Last time this was given:  6 mg on 6/30/2017 10:38 AM                                calcitRIOL 0.25 MCG capsule   Commonly known as:  ROCALTROL   Take 1 capsule (0.25 mcg) by mouth daily   Last time this was given:  0.25 mcg on 6/30/2017  7:53 AM                                darbepoetin emily 100 MCG/0.5ML injection   Commonly known as:  ARANESP (ALBUMIN FREE)   Inject 0.5 mLs (100 mcg) Subcutaneous every 14 days As needed for hgb<10g/dL.  If Hgb increases >1 point in 2 weeks (if blood transfusion given, use hgb PRIOR to this), SYSTOLIC BP > 180 mmHg or hgb>=10g/dL, HOLD DOSE. Dose must be within 1 week of Hgb.  Per anemia protocol with Brice Caraballo MD                                docusate sodium 100 MG capsule   Commonly known as:  COLACE   Take 1 capsule (100 mg) by mouth 2 times daily   Last time this was given:  100 mg on 6/29/2017  9:35 PM                                glipiZIDE 5 MG tablet   Commonly known as:  GLUCOTROL   Take 1 tablet by mouth. TAKE 1 TABLET BY MOUTH DAILY BEFORE A MEAL                                 hydrALAZINE 50 MG tablet   Commonly known as:  APRESOLINE   Take 1 tablet (50 mg) by mouth 3 times daily   Last time this was given:  50 mg on 6/30/2017  8:03 AM                                isosorbide mononitrate 60 MG 24 hr tablet   Commonly known as:  IMDUR   Take 1 tablet (60 mg) by mouth daily   Last time this was given:  60 mg on 6/30/2017  8:04 AM                                loratadine 10 MG tablet   Commonly known as:  CLARITIN   Take 10 mg by mouth daily as needed Reported on 5/3/2017                                losartan 100 MG tablet   Commonly known as:  COZAAR   Take 1 tablet (100 mg) by mouth daily   Last time this was given:  100 mg on 6/30/2017  8:03 AM                                metolazone 2.5 MG tablet   Commonly known as:  ZAROXOLYN   Take 1 tablet (2.5 mg) by mouth every other day   Last time this was given:  2.5 mg on 6/30/2017 10:38 AM                                metoprolol 100 MG 24 hr tablet   Commonly known as:  TOPROL XL   Take 1 tablet (100 mg) by mouth daily   Last time this was given:  100 mg on 6/30/2017  8:04 AM                                omeprazole 20 MG CR capsule   Commonly known as:  priLOSEC   Take 20 mg by mouth daily At night   Last time this was given:  20 mg on 6/30/2017  7:53 AM                                * potassium chloride SA 20 MEQ CR tablet   Commonly known as:  K-DUR/KLOR-CON M   Take 2 tablets (40 mEq) by mouth once for 1 dose   Last time this was given:  20 mEq on 6/30/2017 12:01 PM                                * potassium chloride SA 20 MEQ CR tablet   Commonly known as:  K-DUR/KLOR-CON M   Take 40mEq in the morning (2 tabs) and 20mEq (1 tab) in the evening for a total of 60mEq daily.   Last time this was given:  20 mEq on 6/30/2017 12:01 PM                                predniSONE 10 MG tablet   Commonly known as:  DELTASONE   Take 1 tablet (10 mg) by mouth daily   Last time this was given:  10 mg on 6/30/2017  7:54 AM                                 sodium bicarbonate 650 MG tablet   TAKE 2 TABLETS(1300 MG) BY MOUTH THREE TIMES DAILY   Last time this was given:  1,300 mg on 6/30/2017  7:53 AM                                * Notice:  This list has 2 medication(s) that are the same as other medications prescribed for you. Read the directions carefully, and ask your doctor or other care provider to review them with you.

## 2017-06-26 NOTE — PROGRESS NOTES
"BP (!) 153/97  Pulse 102  Temp 97.9  F (36.6  C) (Oral)  Resp 16  Ht 1.753 m (5' 9\")  Wt 87.5 kg (193 lb)  SpO2 97%  BMI 28.5 kg/m2  Adm: 6/26 for chest pain, sore throat  Neuro: AO x 4  Cardiac: SR/ST, VSS, BP elevated (one time 10 mg Hydralazine IV given), BPs still elevated  Respiratory: RA, lung sounds clear  GI/: Voiding adequately; no c/o of nausea  Diet/appetite: 2 g Na diet and 2L FR  Activity: independent, up ad edith  Pain: PRN Tylenol x 1 for headache  Skin: intact, no deficits noted  Lines: L PIV SL'ed  Drains: N/A  Plan: Diurese, manage BPs and continue with plan of care    Will continue to monitor and notify physician of any pertinent changes.    "
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
## 2017-06-26 NOTE — IP AVS SNAPSHOT
[address] [address]    Phone:  [phone]                                       After Visit Summary   6/26/2017    Murray Nicholson    MRN: [id number]           After Visit Summary Signature Page     I have received my discharge instructions, and my questions have been answered. I have discussed any challenges I see with this plan with the nurse or doctor.    ..........................................................................................................................................  Patient/Patient Representative Signature      ..........................................................................................................................................  Patient Representative Print Name and Relationship to Patient    ..................................................               ................................................  Date                                            Time    ..........................................................................................................................................  Reviewed by Signature/Title    ...................................................              ..............................................  Date                                                            Time

## 2017-06-26 NOTE — PROGRESS NOTES
Cardiology Daily Note   Date of Service: 6/26/2017  Patient: Murray Nicholson  MRN: [id number]  Admission Date: 6/26/2017  Hospital Day # 0    Assessment & Plan:   Murray Nicholson is a 62 year old male with PMHx significant for CAD, ischemic cardiomyopathy (EF20-25%), HFrEF, CKD V, T2DM, HTN, ascending aortic aneurysm who was admitted with atypical right sided chest pain and mild heart failure exacerbation.    # Atypical chest pain:  # Type II Demand ischemia:  Chest pain likely related to volume overload (similar to prior discomfort per patient report). No e/o ACS at this time.  - Tele  - Trop peaked at 0.187 -> 0.172; no need to trend further    # Acute on chronic systolic heart failure 2/2 ICM (EF 20-25%):  Patient endorsed orthopnea, increased abdominal girth, elevated NT-BNP and weight up approx 10lbs.  - Daily weights, strict I/Os  - BID lytes  - Diuretics: Lasix 100mg IV x1 with metolazone 2.5mg PO x1 30 min prior  - Goal net -ve 1.5L  - Continue daily ASA, Toprol, hydralazine, losartan Statin    # Right lung infiltrate:  Noted on CXR. No leukocytosis, fevers, cough, hypoxia. Procal low. Low suspicion for PNA.  - Will hold on further abx for now    # CKD V:  PD catheter placed last week. Still making urine.  - Avoid nephrotoxic agents  - Continue home bicarb and calcitriol    CHRONIC CONDITIONS, STABLE:  # Type II DM: Hold orals, SSI  # Gout: Continue home allopurinol    Consulting Services: None    CODE: Full  DVT Ppx: None, ambulate  Diet/Fluids: 2mg Na, 1.8L fluid  Disposition: Home in the next 1-2 days.    Pt's care was discussed with bedside RN and patient during Care Team Rounds.    Patient was discussed and evaluated with Dr. Mohamud Marks MD PGY2  Internal Medicine Resident  Pager: [phone]    ___________________________________________________________________    Subjective & Interval History:     Last 24 hr care team notes reviewed. Patient admitted overnight, this morning had one  "30min episode of recurrent paint that resolved without intervention. No current chest pain, shortness of breath at rest, nausea or vomiting.    Medications: Reviewed in EPIC. List below for reference    Physical Exam:    Blood pressure (!) 147/93, pulse 102, temperature 98  F (36.7  C), temperature source Oral, resp. rate 18, height 1.753 m (5' 9\"), weight 87.5 kg (193 lb), SpO2 100 %.    CONSTITUTIONAL: Mr. Nicholson is lying down in bed in no apparent distress on exam.  HEENT:  NC/AT.  OP Clear.  MMM. PERRLA.  EOMI.   NECK: Supple, no cervical LAD, JVD to angle of the jaw.  LUNGS: No increased work of breathing, faint basilar crackles  CARDIOVASCULAR: RRR w/ no M/R/G.   ABDOMEN: Soft, ND, NT, BS +ve.   MUSCULOSKELETAL:  Moves all four extremities without difficulty.  DP pulses intact.  No lower extremity edema.   NEURO: A&Ox3, CN II-XII grossly intact, non-focal.   PSYCHIATRIC:  Normal affect.  SKIN: Warm, Dry, No rashes.     Lines/Tubes:   Peripheral IV 06/26/17 Left Upper forearm (Active)   Site Assessment WDL 6/26/2017  4:00 PM   Line Status Saline locked 6/26/2017  4:00 PM   Phlebitis Scale 0-->no symptoms 6/26/2017  4:00 PM   Infiltration Scale 0 6/26/2017  4:00 PM   Extravasation? No 6/26/2017  7:00 AM   Number of days:0       Labs & Studies of Note: Reviewed in Epic  CMP  Recent Labs  Lab 06/26/17  0140 06/21/17  1058   * 139   POTASSIUM 4.0 3.8   CHLORIDE 110* 106   CO2 20 21   ANIONGAP 15* 12   * 140*   * 140*   CR 5.10* 5.28*   GFRESTIMATED 12* 11*   GFRESTBLACK 14* 13*   TRINI 8.1* 8.4*   PHOS  --  5.0*   ALBUMIN  --  2.9*       CBC  Recent Labs  Lab 06/26/17  0140 06/21/17  1058   WBC 8.3 6.0   RBC 3.23* 3.21*   HGB 9.4* 9.8*   HCT 30.2* 29.5*    183       INRNo lab results found in last 7 days.    Unresulted Labs Ordered in the Past 30 Days of this Admission     Date and Time Order Name Status Description    6/26/2017 1457 Troponin I In process     6/26/2017 0257 Blood culture " Preliminary     6/26/2017 0257 Blood culture Preliminary           Medication List for Reference (delete if desired)  Current Facility-Administered Medications   Medication     lidocaine 1 % 1 mL     lidocaine (LMX4) kit     sodium chloride (PF) 0.9% PF flush 3 mL     sodium chloride (PF) 0.9% PF flush 3 mL     medication instruction     acetaminophen (TYLENOL) tablet 325-650 mg     albuterol (PROAIR HFA/PROVENTIL HFA/VENTOLIN HFA) Inhaler 2 puff     allopurinol (ZYLOPRIM) tablet 200 mg     aspirin chewable tablet 81 mg     atorvastatin (LIPITOR) tablet 40 mg     calcitRIOL (ROCALTROL) capsule 0.25 mcg     docusate sodium (COLACE) capsule 100 mg     hydrALAZINE (APRESOLINE) tablet 50 mg     isosorbide mononitrate (IMDUR) 24 hr tablet 60 mg     losartan (COZAAR) tablet 100 mg     metoprolol (TOPROL-XL) 24 hr tablet 100 mg     omeprazole (priLOSEC) CR capsule 20 mg     potassium chloride SA (K-DUR/KLOR-CON M) CR tablet 40 mEq     predniSONE (DELTASONE) tablet 10 mg     sodium bicarbonate tablet 1,300 mg     glucose 40 % gel 15-30 g    Or     dextrose 50 % injection 25-50 mL    Or     glucagon injection 1 mg     insulin aspart (NovoLOG) inj (RAPID ACTING)     insulin aspart (NovoLOG) inj (RAPID ACTING)     Facility-Administered Medications Ordered in Other Encounters   Medication     bupivacaine 0.25 % - EPINEPHrine 1:200,000 injection     CXR:   FINDINGS: Upright portable chest. The heart is enlarged without  pulmonary edema. There is infiltrate in the right mid and lower lung.  The lungs are otherwise clear. No pneumothorax.         IMPRESSION: Right lung infiltrate may be pneumonia.    I very much appreciated the opportunity to see and assess Mr Murray Nicholson in the hospital with CV Fellow Dr Phillips and Cards 1 resident staff. Today I spent approx 15 min reviewing his history and current status.     I agree with the note above which summarizes my findings and current recommendations.  Please do not hesitate to contact  my office if you have any questions or concerns.      Darvin Mallory MD  Cardiac Arrhythmia Service  [facility]  [phone]

## 2017-06-27 LAB
ANION GAP SERPL CALCULATED.3IONS-SCNC: 11 MMOL/L (ref 3–14)
ANION GAP SERPL CALCULATED.3IONS-SCNC: 13 MMOL/L (ref 3–14)
BUN SERPL-MCNC: 112 MG/DL (ref 7–30)
BUN SERPL-MCNC: 114 MG/DL (ref 7–30)
CALCIUM SERPL-MCNC: 8.8 MG/DL (ref 8.5–10.1)
CALCIUM SERPL-MCNC: 9.1 MG/DL (ref 8.5–10.1)
CHLORIDE SERPL-SCNC: 106 MMOL/L (ref 94–109)
CHLORIDE SERPL-SCNC: 108 MMOL/L (ref 94–109)
CO2 SERPL-SCNC: 22 MMOL/L (ref 20–32)
CO2 SERPL-SCNC: 23 MMOL/L (ref 20–32)
CREAT SERPL-MCNC: 5.13 MG/DL (ref 0.66–1.25)
CREAT SERPL-MCNC: 5.44 MG/DL (ref 0.66–1.25)
GFR SERPL CREATININE-BSD FRML MDRD: 11 ML/MIN/1.7M2
GFR SERPL CREATININE-BSD FRML MDRD: 11 ML/MIN/1.7M2
GLUCOSE BLDC GLUCOMTR-MCNC: 133 MG/DL (ref 70–99)
GLUCOSE BLDC GLUCOMTR-MCNC: 183 MG/DL (ref 70–99)
GLUCOSE BLDC GLUCOMTR-MCNC: 202 MG/DL (ref 70–99)
GLUCOSE BLDC GLUCOMTR-MCNC: 245 MG/DL (ref 70–99)
GLUCOSE SERPL-MCNC: 132 MG/DL (ref 70–99)
GLUCOSE SERPL-MCNC: 217 MG/DL (ref 70–99)
HBA1C MFR BLD: 6.4 % (ref 4.3–6)
LACTATE BLD-SCNC: 0.8 MMOL/L (ref 0.7–2.1)
MAGNESIUM SERPL-MCNC: 2.4 MG/DL (ref 1.6–2.3)
MAGNESIUM SERPL-MCNC: 2.4 MG/DL (ref 1.6–2.3)
POTASSIUM SERPL-SCNC: 3.5 MMOL/L (ref 3.4–5.3)
POTASSIUM SERPL-SCNC: 3.7 MMOL/L (ref 3.4–5.3)
SODIUM SERPL-SCNC: 141 MMOL/L (ref 133–144)
SODIUM SERPL-SCNC: 141 MMOL/L (ref 133–144)

## 2017-06-27 PROCEDURE — 36415 COLL VENOUS BLD VENIPUNCTURE: CPT | Performed by: STUDENT IN AN ORGANIZED HEALTH CARE EDUCATION/TRAINING PROGRAM

## 2017-06-27 PROCEDURE — 25000125 ZZHC RX 250: Performed by: STUDENT IN AN ORGANIZED HEALTH CARE EDUCATION/TRAINING PROGRAM

## 2017-06-27 PROCEDURE — 97530 THERAPEUTIC ACTIVITIES: CPT | Mod: GP | Performed by: PHYSICAL THERAPIST

## 2017-06-27 PROCEDURE — S0169 CALCITROL: HCPCS | Performed by: STUDENT IN AN ORGANIZED HEALTH CARE EDUCATION/TRAINING PROGRAM

## 2017-06-27 PROCEDURE — 83605 ASSAY OF LACTIC ACID: CPT | Performed by: INTERNAL MEDICINE

## 2017-06-27 PROCEDURE — 25000132 ZZH RX MED GY IP 250 OP 250 PS 637: Performed by: STUDENT IN AN ORGANIZED HEALTH CARE EDUCATION/TRAINING PROGRAM

## 2017-06-27 PROCEDURE — 25000128 H RX IP 250 OP 636: Performed by: STUDENT IN AN ORGANIZED HEALTH CARE EDUCATION/TRAINING PROGRAM

## 2017-06-27 PROCEDURE — 25000131 ZZH RX MED GY IP 250 OP 636 PS 637: Performed by: STUDENT IN AN ORGANIZED HEALTH CARE EDUCATION/TRAINING PROGRAM

## 2017-06-27 PROCEDURE — 40000193 ZZH STATISTIC PT WARD VISIT: Performed by: PHYSICAL THERAPIST

## 2017-06-27 PROCEDURE — 83036 HEMOGLOBIN GLYCOSYLATED A1C: CPT | Performed by: STUDENT IN AN ORGANIZED HEALTH CARE EDUCATION/TRAINING PROGRAM

## 2017-06-27 PROCEDURE — 80048 BASIC METABOLIC PNL TOTAL CA: CPT | Performed by: STUDENT IN AN ORGANIZED HEALTH CARE EDUCATION/TRAINING PROGRAM

## 2017-06-27 PROCEDURE — 97161 PT EVAL LOW COMPLEX 20 MIN: CPT | Mod: GP | Performed by: PHYSICAL THERAPIST

## 2017-06-27 PROCEDURE — 99232 SBSQ HOSP IP/OBS MODERATE 35: CPT | Mod: GC | Performed by: INTERNAL MEDICINE

## 2017-06-27 PROCEDURE — 83735 ASSAY OF MAGNESIUM: CPT | Performed by: STUDENT IN AN ORGANIZED HEALTH CARE EDUCATION/TRAINING PROGRAM

## 2017-06-27 PROCEDURE — 21400006 ZZH R&B CCU INTERMEDIATE UMMC

## 2017-06-27 PROCEDURE — 36415 COLL VENOUS BLD VENIPUNCTURE: CPT | Performed by: INTERNAL MEDICINE

## 2017-06-27 PROCEDURE — 97110 THERAPEUTIC EXERCISES: CPT | Mod: GP | Performed by: PHYSICAL THERAPIST

## 2017-06-27 PROCEDURE — 00000146 ZZHCL STATISTIC GLUCOSE BY METER IP

## 2017-06-27 RX ORDER — BUMETANIDE 2 MG/1
4 TABLET ORAL ONCE
Status: COMPLETED | OUTPATIENT
Start: 2017-06-27 | End: 2017-06-27

## 2017-06-27 RX ORDER — POTASSIUM CHLORIDE 750 MG/1
20 TABLET, EXTENDED RELEASE ORAL ONCE
Status: COMPLETED | OUTPATIENT
Start: 2017-06-27 | End: 2017-06-28

## 2017-06-27 RX ORDER — POTASSIUM CHLORIDE 1.5 G/1.58G
20-40 POWDER, FOR SOLUTION ORAL
Status: DISCONTINUED | OUTPATIENT
Start: 2017-06-27 | End: 2017-06-29

## 2017-06-27 RX ORDER — POTASSIUM CL/LIDO/0.9 % NACL 10MEQ/0.1L
10 INTRAVENOUS SOLUTION, PIGGYBACK (ML) INTRAVENOUS
Status: DISCONTINUED | OUTPATIENT
Start: 2017-06-27 | End: 2017-06-29

## 2017-06-27 RX ORDER — MAGNESIUM SULFATE HEPTAHYDRATE 40 MG/ML
4 INJECTION, SOLUTION INTRAVENOUS EVERY 4 HOURS PRN
Status: DISCONTINUED | OUTPATIENT
Start: 2017-06-27 | End: 2017-06-30 | Stop reason: HOSPADM

## 2017-06-27 RX ORDER — FUROSEMIDE 10 MG/ML
120 INJECTION INTRAMUSCULAR; INTRAVENOUS ONCE
Status: COMPLETED | OUTPATIENT
Start: 2017-06-27 | End: 2017-06-27

## 2017-06-27 RX ORDER — POTASSIUM CHLORIDE 7.45 MG/ML
10 INJECTION INTRAVENOUS
Status: DISCONTINUED | OUTPATIENT
Start: 2017-06-27 | End: 2017-06-29

## 2017-06-27 RX ORDER — POTASSIUM CHLORIDE 29.8 MG/ML
20 INJECTION INTRAVENOUS
Status: DISCONTINUED | OUTPATIENT
Start: 2017-06-27 | End: 2017-06-29

## 2017-06-27 RX ORDER — POTASSIUM CHLORIDE 750 MG/1
20-40 TABLET, EXTENDED RELEASE ORAL
Status: DISCONTINUED | OUTPATIENT
Start: 2017-06-27 | End: 2017-06-29

## 2017-06-27 RX ADMIN — OMEPRAZOLE 20 MG: 20 CAPSULE, DELAYED RELEASE ORAL at 08:26

## 2017-06-27 RX ADMIN — HYDRALAZINE HYDROCHLORIDE 50 MG: 50 TABLET ORAL at 14:47

## 2017-06-27 RX ADMIN — LOSARTAN POTASSIUM 100 MG: 50 TABLET, FILM COATED ORAL at 08:26

## 2017-06-27 RX ADMIN — SODIUM BICARBONATE 650 MG TABLET 1300 MG: at 19:17

## 2017-06-27 RX ADMIN — FUROSEMIDE 10 MG/HR: 10 INJECTION, SOLUTION INTRAVENOUS at 11:28

## 2017-06-27 RX ADMIN — INSULIN ASPART 2 UNITS: 100 INJECTION, SOLUTION INTRAVENOUS; SUBCUTANEOUS at 11:23

## 2017-06-27 RX ADMIN — POTASSIUM CHLORIDE 40 MEQ: 750 TABLET, EXTENDED RELEASE ORAL at 08:26

## 2017-06-27 RX ADMIN — INSULIN ASPART 1 UNITS: 100 INJECTION, SOLUTION INTRAVENOUS; SUBCUTANEOUS at 16:59

## 2017-06-27 RX ADMIN — FUROSEMIDE 10 MG/HR: 10 INJECTION, SOLUTION INTRAVENOUS at 22:31

## 2017-06-27 RX ADMIN — CALCITRIOL 0.25 MCG: 0.25 CAPSULE, LIQUID FILLED ORAL at 08:26

## 2017-06-27 RX ADMIN — INSULIN GLARGINE 3 UNITS: 100 INJECTION, SOLUTION SUBCUTANEOUS at 22:16

## 2017-06-27 RX ADMIN — HYDRALAZINE HYDROCHLORIDE 50 MG: 50 TABLET ORAL at 08:26

## 2017-06-27 RX ADMIN — DOCUSATE SODIUM 100 MG: 100 CAPSULE, LIQUID FILLED ORAL at 08:27

## 2017-06-27 RX ADMIN — ALLOPURINOL 200 MG: 100 TABLET ORAL at 08:26

## 2017-06-27 RX ADMIN — DOCUSATE SODIUM 100 MG: 100 CAPSULE, LIQUID FILLED ORAL at 19:17

## 2017-06-27 RX ADMIN — ATORVASTATIN CALCIUM 40 MG: 40 TABLET, FILM COATED ORAL at 08:26

## 2017-06-27 RX ADMIN — SODIUM BICARBONATE 650 MG TABLET 1300 MG: at 08:26

## 2017-06-27 RX ADMIN — HYDRALAZINE HYDROCHLORIDE 50 MG: 50 TABLET ORAL at 19:17

## 2017-06-27 RX ADMIN — BUMETANIDE 4 MG: 2 TABLET ORAL at 02:30

## 2017-06-27 RX ADMIN — FUROSEMIDE 120 MG: 10 INJECTION, SOLUTION INTRAVENOUS at 11:08

## 2017-06-27 RX ADMIN — ASPIRIN 81 MG CHEWABLE TABLET 81 MG: 81 TABLET CHEWABLE at 08:26

## 2017-06-27 RX ADMIN — METOPROLOL SUCCINATE 100 MG: 100 TABLET, EXTENDED RELEASE ORAL at 08:26

## 2017-06-27 RX ADMIN — PREDNISONE 10 MG: 10 TABLET ORAL at 08:26

## 2017-06-27 RX ADMIN — SODIUM BICARBONATE 650 MG TABLET 1300 MG: at 14:47

## 2017-06-27 RX ADMIN — ISOSORBIDE MONONITRATE 60 MG: 60 TABLET, EXTENDED RELEASE ORAL at 08:26

## 2017-06-27 NOTE — PROGRESS NOTES
06/27/17 1600   Quick Adds   Type of Visit Initial PT Evaluation       Present no   Living Environment   Lives With alone   Living Arrangements apartment   Home Accessibility stairs to enter home;other (see comments)  (laundry in basement of buildin, pt on 2nd floor, no elevator)   Number of Stairs to Enter Home 24  (pt's estimate of total stairs from outside to apartment door)   Stair Railings at Home other (see comments)  (rails present on all stairs, varying location)   Transportation Available car   Living Environment Comment pt lives alone in an apartment that is on the 2nd story, laundry in basement, apartment on hill so stairs to enter building plus stairs to get to front door, has adult sons who live in [place] but seem to be limited help (pt lives in [place])   Self-Care   Usual Activity Tolerance good   Current Activity Tolerance good   Regular Exercise no  (pt on feet all day for job)   Equipment Currently Used at Home none   Activity/Exercise/Self-Care Comment Pt does not exercise regularly, but does own an elliptical and encouraged to use it when able   Functional Level Prior   Ambulation 0-->independent   Transferring 0-->independent   Fall history within last six months no   Which of the above functional risks had a recent onset or change? 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; pt has many stairs in living environment; only concern now is doing laundry d/t carrying load to basement   General Information   Onset of Illness/Injury or Date of Surgery - Date 06/26/17   Referring Physician Ragini Marks MD   Patient/Family Goals Statement Get back to work, increase endurance   Pertinent History of Current Problem (include personal factors and/or comorbidities that impact the POC) Murray Nicholson is a 62 year old male with PMHx significant for CAD, ischemic cardiomyopathy  (EF20-25%), HFrEF, CKD V, T2DM, HTN, ascending aortic aneurysm who was admitted with atypical right sided chest pain and mild heart failure exacerbation   Precautions/Limitations other (see comments)  (pt reports lifting restriction d/t periotneal catheter )   Heart Disease Risk Factors High blood pressure;Lack of physical activity;Dislipidemia;Family history;Medical history;Gender;Age;Diabetes   Cognitive Status Examination   Orientation orientation to person, place and time   Level of Consciousness alert   Follows Commands and Answers Questions 100% of the time   Personal Safety and Judgment intact   Memory intact   Cognitive Comment Pt is alert and is engaging in high level conversations with admirable vocabulary choices   Pain Assessment   Patient Currently in Pain No   Integumentary/Edema   Integumentary/Edema Comments pt has mild edema B LEs and reports holding fluid in abdomen   Posture    Posture Not impaired   Range of Motion (ROM)   ROM Comment B UEs and LEs WFL   Strength   Strength Comments NT-pt moving around IND and has no personal conerns over strength   Bed Mobility   Bed Mobility Comments IND supine>sit   Transfer Skills   Transfer Comments IND STS   Gait   Gait Comments mod IND, using IV pole for some balance support   Balance   Balance Comments 1 LOB noted after pt manny from bed, but pt able to self correct   Sensory Examination   Sensory Perception Comments none per pt report   General Therapy Interventions   Planned Therapy Interventions home program guidelines;progressive activity/exercise   Clinical Impression   Criteria for Skilled Therapeutic Intervention yes, treatment indicated   PT Diagnosis Impaired functional endurance   Influenced by the following impairments SOB, fatigue, reduced endurance, CHF education needs   Functional limitations due to impairments Reduced activity tolerance, impaired IND for managing CHF   Clinical Presentation Stable/Uncomplicated   Clinical Presentation  "Rationale pt here for current exacerbation of chronic condition   Clinical Decision Making (Complexity) Low complexity   Therapy Frequency` other (see comments)  (1 time eval and treat)   Predicted Duration of Therapy Intervention (days/wks) 1 day   Anticipated Discharge Disposition Home   Risk & Benefits of therapy have been explained Yes   Patient, Family & other staff in agreement with plan of care Yes   Clinical Impression Comments By today's date pt will verbalize signs of heartfailure exacerbation; verbalize understadning of exercise intolerance. All goals met   [facility] [employer] [facility] TM \"6 Clicks\"   2016, Trustees of [facility], under license to [employer].  All rights reserved.   6 Clicks Short Forms Basic Mobility Inpatient Short Form   [facility]  \"6 Clicks\" V.2 Basic Mobility Inpatient Short Form   1. Turning from your back to your side while in a flat bed without using bedrails? 4 - None   2. Moving from lying on your back to sitting on the side of a flat bed without using bedrails? 4 - None   3. Moving to and from a bed to a chair (including a wheelchair)? 4 - None   4. Standing up from a chair using your arms (e.g., wheelchair, or bedside chair)? 4 - None   5. To walk in hospital room? 4 - None   6. Climbing 3-5 steps with a railing? 4 - None   Basic Mobility Raw Score (Score out of 24.Lower scores equate to lower levels of function) 24   Total Evaluation Time   Total Evaluation Time (Minutes) 15     "

## 2017-06-27 NOTE — PLAN OF CARE
Problem: Goal Outcome Summary  Goal: Goal Outcome Summary  PT/CR/6C     Initial evaluation and one time treatment completed. Pt is IND with bed mobility, transfers, and mod IND w/ gait. Pt verbalizes understanding of CHF education materials. Pt performed 15 mins of aerobic exercise w/ stable cardiovascular response. Pt instructed to walk in halls for exercise.     Pt is near or at baseline level of function, recommend pt can discharge home when stable.

## 2017-06-27 NOTE — PROGRESS NOTES
CLINICAL NUTRITION SERVICES    Reason for Assessment:  Received consult for sodium restricted diet for heart failure    Diet History:  Pt states he has been seen by dietitians in the past. Per chart, pt has been seen by dietitians for DM in 2013 and CKD/Tx work-up in 2015.     Pt reports awareness of low-sodium diet. He avoids processed foods for the most part and chooses foods lower in sodium. He may add salt to a few items (when boiling water or on a baked potato) but states he has reduced his sodium intake and uses different seasoning and herbs. While he is at work, he usually has food from home but will occasionally eat at the [employer] court. Per pt, he does pre-prepare foods to help with ease of his daily work routine.     Per pt, plan is to start peritoneal dialysis (PD) in the future. Has not had nutrition education regarding PD in the recent past.    Nutrition Diagnosis:  Food- and nutrition-related knowledge deficit r/t no previous knowledge of dialysis diet AEB pt report of no previous formal dialysis diet nutrition education in the past.    Nutrition Prescription/Recs:  Continue low-sodium diet. Fluid restriction per team.  Monitor lytes (Phos and K+) and potential need for restriction if necessary.     Interventions:  Nutrition Education  1.  Provided verbal reinforcmeent on a heart-healthy diet with emphasis on low-sodium meal planning. Discussed low-sodium foods and high sodium foods, especially in relation to label reading. Discussed nutrition considerations once starting PD. Discussed need for increased protein intake, adequate kcals, continuation of low-sodium, continuation of fluid restriction per team, and potential need of Phos and/or K+ restriction (pending serial labs).   2.  Provided the following handouts: How to Read Nutrition Labels, Tips for a Low-Sodium Diet, Seasoning Your Foods Without Adding Salt, and Managing Fluid Restriction. Also, provided handout on Controlling Your Phosphorus Intake.       Goals:    1. Pt will verbalize at least five high sodium foods and the importance of avoiding added salt to foods for cooking or seasoning foods.   2. Pt will verbalize at least two aspects of a dialysis diet.     Follow-up:   Patient to ask any further nutrition-related questions before discharge.  In addition, pt may request outpatient RD appointment.    Mary Silva MS, RD, LD, [facility]   6C Pgr:  [phone]

## 2017-06-27 NOTE — PROGRESS NOTES
Pt VSS; ST with HR in the 100s; RA with sats in the mid 90s. No complaints of chest pain or discomfort. Advocates some SOB and MEADE; 120mg IV Lasix administered and Lasix gtt started at 10mg/hr. Up independently; good UO. No BM. HF teaching initiated. BS covered with SSI per parameters; fair appetite. PD dressing to be changed Thursday 6/29; dressing WNL. Continue to monitor and notify team with changes.

## 2017-06-27 NOTE — PROGRESS NOTES
D: Pt with PMH of CAD, ICM, CKD 5, DM2, HTN, AAA, was admitted with atypical right sided chest pain and mild heart failure exacerbation.     A/I: Pt SBP in 150's and DBP in 90's overnight. Also, pt R/R went up to 24 and pt became SOB without exertion. Temp was 99.1 F at that time. LS continue to have crackles bilaterally. Team notified of this overnight. Sepsis protocol was triggered and Lactic acid came back WNL. Order placed for Bumex PO 4 mg and this was given at 0230. Pt has urinated adequate amount overnight. Paced rhythm with HR in the 90s-100's. Pt on 1 L O2 NC with sats in the upper 90s. Pt denies chest pain or discomfort. Troponin levels trending down. PD dressing to be changed Thursday. Dressing has scant amount of dried drainage. Pt up with SBA overnight to void. BS stable and did not require s/s insulin coverage overnight. HF teaching material at bedside. Magnesium replaced overnight.    P: RN will continue to monitor and assess. RN will update team with any changes/concerns. [address]

## 2017-06-28 ENCOUNTER — TELEPHONE (OUTPATIENT)
Dept: CARDIOLOGY | Facility: CLINIC | Age: 62
End: 2017-06-28

## 2017-06-28 LAB
ANION GAP SERPL CALCULATED.3IONS-SCNC: 11 MMOL/L (ref 3–14)
ANION GAP SERPL CALCULATED.3IONS-SCNC: 12 MMOL/L (ref 3–14)
BUN SERPL-MCNC: 116 MG/DL (ref 7–30)
BUN SERPL-MCNC: 124 MG/DL (ref 7–30)
CALCIUM SERPL-MCNC: 9 MG/DL (ref 8.5–10.1)
CALCIUM SERPL-MCNC: 9 MG/DL (ref 8.5–10.1)
CHLORIDE SERPL-SCNC: 101 MMOL/L (ref 94–109)
CHLORIDE SERPL-SCNC: 104 MMOL/L (ref 94–109)
CO2 SERPL-SCNC: 24 MMOL/L (ref 20–32)
CO2 SERPL-SCNC: 24 MMOL/L (ref 20–32)
CREAT SERPL-MCNC: 5.56 MG/DL (ref 0.66–1.25)
CREAT SERPL-MCNC: 5.73 MG/DL (ref 0.66–1.25)
GFR SERPL CREATININE-BSD FRML MDRD: 10 ML/MIN/1.7M2
GFR SERPL CREATININE-BSD FRML MDRD: 10 ML/MIN/1.7M2
GLUCOSE BLDC GLUCOMTR-MCNC: 116 MG/DL (ref 70–99)
GLUCOSE BLDC GLUCOMTR-MCNC: 119 MG/DL (ref 70–99)
GLUCOSE BLDC GLUCOMTR-MCNC: 157 MG/DL (ref 70–99)
GLUCOSE BLDC GLUCOMTR-MCNC: 171 MG/DL (ref 70–99)
GLUCOSE BLDC GLUCOMTR-MCNC: 265 MG/DL (ref 70–99)
GLUCOSE SERPL-MCNC: 118 MG/DL (ref 70–99)
GLUCOSE SERPL-MCNC: 263 MG/DL (ref 70–99)
MAGNESIUM SERPL-MCNC: 2.1 MG/DL (ref 1.6–2.3)
MAGNESIUM SERPL-MCNC: 2.2 MG/DL (ref 1.6–2.3)
POTASSIUM SERPL-SCNC: 3.4 MMOL/L (ref 3.4–5.3)
POTASSIUM SERPL-SCNC: 3.7 MMOL/L (ref 3.4–5.3)
SODIUM SERPL-SCNC: 137 MMOL/L (ref 133–144)
SODIUM SERPL-SCNC: 140 MMOL/L (ref 133–144)

## 2017-06-28 PROCEDURE — 36415 COLL VENOUS BLD VENIPUNCTURE: CPT | Performed by: STUDENT IN AN ORGANIZED HEALTH CARE EDUCATION/TRAINING PROGRAM

## 2017-06-28 PROCEDURE — 25000132 ZZH RX MED GY IP 250 OP 250 PS 637: Performed by: STUDENT IN AN ORGANIZED HEALTH CARE EDUCATION/TRAINING PROGRAM

## 2017-06-28 PROCEDURE — 21400006 ZZH R&B CCU INTERMEDIATE UMMC

## 2017-06-28 PROCEDURE — 00000146 ZZHCL STATISTIC GLUCOSE BY METER IP

## 2017-06-28 PROCEDURE — 80048 BASIC METABOLIC PNL TOTAL CA: CPT | Performed by: STUDENT IN AN ORGANIZED HEALTH CARE EDUCATION/TRAINING PROGRAM

## 2017-06-28 PROCEDURE — 83735 ASSAY OF MAGNESIUM: CPT | Performed by: STUDENT IN AN ORGANIZED HEALTH CARE EDUCATION/TRAINING PROGRAM

## 2017-06-28 PROCEDURE — 99232 SBSQ HOSP IP/OBS MODERATE 35: CPT | Mod: GC | Performed by: INTERNAL MEDICINE

## 2017-06-28 PROCEDURE — S0169 CALCITROL: HCPCS | Performed by: STUDENT IN AN ORGANIZED HEALTH CARE EDUCATION/TRAINING PROGRAM

## 2017-06-28 PROCEDURE — 25000132 ZZH RX MED GY IP 250 OP 250 PS 637: Performed by: INTERNAL MEDICINE

## 2017-06-28 PROCEDURE — 25000125 ZZHC RX 250: Performed by: STUDENT IN AN ORGANIZED HEALTH CARE EDUCATION/TRAINING PROGRAM

## 2017-06-28 PROCEDURE — 25000128 H RX IP 250 OP 636: Performed by: STUDENT IN AN ORGANIZED HEALTH CARE EDUCATION/TRAINING PROGRAM

## 2017-06-28 RX ORDER — BUMETANIDE 2 MG/1
4 TABLET ORAL
Status: DISCONTINUED | OUTPATIENT
Start: 2017-06-28 | End: 2017-06-29

## 2017-06-28 RX ORDER — METOLAZONE 2.5 MG/1
2.5 TABLET ORAL ONCE
Status: COMPLETED | OUTPATIENT
Start: 2017-06-28 | End: 2017-06-28

## 2017-06-28 RX ADMIN — INSULIN GLARGINE 3 UNITS: 100 INJECTION, SOLUTION SUBCUTANEOUS at 22:29

## 2017-06-28 RX ADMIN — DOCUSATE SODIUM 100 MG: 100 CAPSULE, LIQUID FILLED ORAL at 08:09

## 2017-06-28 RX ADMIN — ISOSORBIDE MONONITRATE 60 MG: 60 TABLET, EXTENDED RELEASE ORAL at 08:08

## 2017-06-28 RX ADMIN — ATORVASTATIN CALCIUM 40 MG: 40 TABLET, FILM COATED ORAL at 08:08

## 2017-06-28 RX ADMIN — INSULIN ASPART 1 UNITS: 100 INJECTION, SOLUTION INTRAVENOUS; SUBCUTANEOUS at 12:34

## 2017-06-28 RX ADMIN — HYDRALAZINE HYDROCHLORIDE 50 MG: 50 TABLET ORAL at 14:21

## 2017-06-28 RX ADMIN — PREDNISONE 10 MG: 10 TABLET ORAL at 08:09

## 2017-06-28 RX ADMIN — POTASSIUM CHLORIDE 40 MEQ: 750 TABLET, EXTENDED RELEASE ORAL at 08:07

## 2017-06-28 RX ADMIN — HYDRALAZINE HYDROCHLORIDE 50 MG: 50 TABLET ORAL at 08:09

## 2017-06-28 RX ADMIN — OMEPRAZOLE 20 MG: 20 CAPSULE, DELAYED RELEASE ORAL at 08:09

## 2017-06-28 RX ADMIN — ACETAMINOPHEN 650 MG: 325 TABLET, FILM COATED ORAL at 23:40

## 2017-06-28 RX ADMIN — POTASSIUM CHLORIDE 20 MEQ: 750 TABLET, EXTENDED RELEASE ORAL at 00:48

## 2017-06-28 RX ADMIN — BUMETANIDE 4 MG: 2 TABLET ORAL at 15:42

## 2017-06-28 RX ADMIN — CALCITRIOL 0.25 MCG: 0.25 CAPSULE, LIQUID FILLED ORAL at 08:08

## 2017-06-28 RX ADMIN — DOCUSATE SODIUM 100 MG: 100 CAPSULE, LIQUID FILLED ORAL at 19:17

## 2017-06-28 RX ADMIN — FUROSEMIDE 10 MG/HR: 10 INJECTION, SOLUTION INTRAVENOUS at 08:03

## 2017-06-28 RX ADMIN — BUMETANIDE 4 MG: 2 TABLET ORAL at 11:58

## 2017-06-28 RX ADMIN — SODIUM BICARBONATE 650 MG TABLET 1300 MG: at 08:07

## 2017-06-28 RX ADMIN — ALLOPURINOL 200 MG: 100 TABLET ORAL at 08:08

## 2017-06-28 RX ADMIN — METOPROLOL SUCCINATE 100 MG: 100 TABLET, EXTENDED RELEASE ORAL at 08:09

## 2017-06-28 RX ADMIN — ASPIRIN 81 MG CHEWABLE TABLET 81 MG: 81 TABLET CHEWABLE at 08:07

## 2017-06-28 RX ADMIN — SODIUM BICARBONATE 650 MG TABLET 1300 MG: at 14:21

## 2017-06-28 RX ADMIN — LOSARTAN POTASSIUM 100 MG: 50 TABLET, FILM COATED ORAL at 08:07

## 2017-06-28 RX ADMIN — SODIUM BICARBONATE 650 MG TABLET 1300 MG: at 19:16

## 2017-06-28 RX ADMIN — HYDRALAZINE HYDROCHLORIDE 50 MG: 50 TABLET ORAL at 19:17

## 2017-06-28 RX ADMIN — METOLAZONE 2.5 MG: 2.5 TABLET ORAL at 23:46

## 2017-06-28 RX ADMIN — INSULIN ASPART 2 UNITS: 100 INJECTION, SOLUTION INTRAVENOUS; SUBCUTANEOUS at 18:44

## 2017-06-28 NOTE — PROGRESS NOTES
Care Coordinator Progress Note     Admission Date/Time:  6/26/2017  Attending MD:  Bobby Laguna MD   Data  Chart reviewed, discussed with interdisciplinary team.   Patient was admitted for:    Acute chest pain  Pneumonia of right lung due to infectious organism, unspecified part of lung  Systolic congestive heart failure, unspecified congestive heart failure chronicity (H)  CKD (chronic kidney disease) stage 5, GFR less than 15 ml/min (H).    Concerns with insurance coverage for discharge needs: None.  Current Living Situation: Patient lives alone. Pt said that he is independent with his own care.   Support System: Supportive and Involved sons.   Services Involved: [facility]   Transportation: Family or Friend will provide  Barriers to Discharge: acute on chronic heart failure.     Coordination of Care and Referrals: Provided patient/family with options for appt with [facility].    Assessment     Pt said that he is already set up for education with [facility] but his PD catheter is not yet ready to be used. Pt said that he has an appt tomorrow with [facility] and requested that this writer cancel that appt and reschedule another appt.      This writer spoke with Zelda from [facility] (Phone [phone]) and pt's appt on 6/29/17 is cancelled and rescheduled for 7/7/17 at 12:30 PM. Zelda said that pt's PD catheter dressing needs to be changed prior to pt's discharge from hospital; Dr. Ragini Marks is aware.     Plan  Anticipated Discharge Date:  6/29/17  Anticipated Discharge Plan:  discharge to home.     JONATHAN BULLARD RN BSN  Care Coordinator   [phone]

## 2017-06-28 NOTE — PROGRESS NOTES
CARDIOLOGY PROGRESS NOTE    SUBJECTIVE:  Mr. Nicholson had further dyspnea last night. No chest pain or dizziness. Dyspnea necessitated a brief period of oxygen supplementation with NC. Had 6-beat run of non-sustained VT on telemetry.     ROS otherwise negative.    OBJECTIVE:  Vital signs:  141/81 (Range   102 (HR )  17 (RR 15-24)  98.5 (Tm 99.1)  188 lbs 9.6 oz (193 lbs 06/26)  Tm 99.1    I/O: -1695 (after 220 Lasix)  Intake: 2010 in   Output: 1935 out     PHYSICAL EXAM:  Gen: Looks well but some resp distress with moving   HEENT: MMM  Resp: Tachypneic with minor movements; chest ausc notable for diffuse fine insp creps  CVS: JVP to the angle of the mandible, pulse S1+S2 with S3  Abdo: ND, soft, RUQ TTP, +BS  Extremities: Warm, well-perfused, moderate peripheral edema  Neuro: GCS 15/15    Recent Labs   Lab Test  06/27/17   0638  06/26/17   0140  06/21/17   1058  05/31/17   1111   HGB   --   9.4*  9.8*  9.9*   HCT   --   30.2*  29.5*  31.1*   WBC   --   8.3  6.0  8.0   MCV   --   94  92  92   MCH   --   29.1  30.5  29.2   MCHC   --   31.1*  33.2  31.8   RDW   --   16.7*  16.4*  16.2*   PLT   --   203  183  302   NA  141  145*  139  139   POTASSIUM  3.5  4.0  3.8  3.2*   CHLORIDE  108  110*  106  93*   CO2  23  20  21  33*   BUN  112*  116*  140*  146*   CR  5.13*  5.10*  5.28*  5.36*   GLC  132*  222*  140*  173*   TRINI  8.8  8.1*  8.4*  7.6*   ALBUMIN   --    --   2.9*  2.7*   BILITOTAL   --    --    --   0.5   ALKPHOS   --    --    --   97   AST   --    --    --   18   ALT   --    --    --   16    < > = values in this interval not displayed.       Micro:  Nil     Imaging:  Nil     Cardiac meds: ASA 81, atorvastatin 40 mg q24h, hydralazine 50 mg TID, ISMN 60 mg q24h, losartan 100 mg q24h, metoprolol succinate 100 mg q24h   Non-cardiac meds: allopurinol 200 mg q24h, calcitriol, docusate, omeprazole, potassium, prednisone 10 mg q24h, sodium bicarbonate      ASSESSMENT/PLAN:  Mr. Murray Nicholson is a 62-year old male  with a PMHx of CKD 5 due to DM/HTN, CAD s/p CABG, and ICM presenting for evaluation of volume overload.     - Acute-on-chronic decompensated HFrEF, CKD 5, troponinemia; HTN; hypoxic resp failure     - Intensify diuresis with furosemide 10 mg/hr   - Continue hydralazine 50 mg TID, ISMN 60 mg q24h, losartan 100 mg q24h   - BID BMP     - CAD s/p CABG   - Continue atorvastatin 40 mg q24h, ASA 81    - T2DM   - Start three units glargine nightly in view of deranged glc during daytime   - Continue sliding scale     - Hyperuricemia/gout   - Continue prednisone     Seen and staffed with Dr. Mallory.    Felton Phillips  Cardiology Fellow  Pager: [phone]      I very much appreciated the opportunity to see and assess Mr Murray Nicholson in the hospital with CV Fellow Dr Phillips and resident team.  I spent approx 15 min discussing his status and our treatent plans and addressing his queries.. He voiced understnanding and agreement      I agree with the note above which summarizes my findings and current recommendations.     Please do not hesitate to contact my office if you have any questions or concerns.      Darvin Mallory MD  Cardiac Arrhythmia Service  [facility]  [phone]

## 2017-06-28 NOTE — PLAN OF CARE
Problem: Goal Outcome Summary  Goal: Goal Outcome Summary  Outcome: Improving  Shift summary     Pt continues on lasix gtt at 10 mg hour. Pt up independently, eating and drinking. Pt's lungs clear, no edema. Pt denies any c/o SOB. Pt in SR. VSS. Heart failure teaching started on days. POC continue lasix gtt, monitor I and O's, monitor labs

## 2017-06-28 NOTE — PROGRESS NOTES
Cardiology Daily Note   Date of Service: 6/28/2017  Patient: Murray Nicholson  MRN: [id number]  Admission Date: 6/26/2017  Hospital Day # 2    Assessment & Plan:   Murray Nicholson is a 62 year old male with PMHx significant for CAD, ischemic cardiomyopathy (EF20-25%), HFrEF, CKD V, T2DM, HTN, ascending aortic aneurysm who was admitted with atypical right sided chest pain and mild heart failure exacerbation.        Changes Today:  - DC lasix gtt  - Start bumex 4mg PO BID with metolazone PRN to meet -ve 1.5L/24h  - BID lytes check     # Acute on chronic systolic heart failure 2/2 ICM (EF 20-25%):  Patient endorsed orthopnea, increased abdominal girth, elevated NT-BNP and weight up approx 10lbs. Dry weight ~ 181-182lbs.  - Daily weights, strict I/Os  - BID lytes  - Diuretics: Bumex 4mg PO BID, Metolazone PRN; - Goal net -ve 1.5L  - Continue daily ASA, Toprol, hydralazine, losartan Statin    # Right lung infiltrate:  Noted on CXR. No leukocytosis, fevers, cough, hypoxia. Procal low. Low suspicion for PNA.  - Will hold on further abx for now    # CKD V:  PD catheter placed last week. Still making urine.  - Avoid nephrotoxic agents  - Continue home bicarb and calcitriol    CHRONIC CONDITIONS, STABLE:  # Type II DM: Hold orals, SSI  # Gout: Continue home allopurinol    Consulting Services: None    CODE: Full  DVT Ppx: None, ambulate  Diet/Fluids: 2mg Na, 1.8L fluid  Disposition: Home in the next 1-2 days.    Pt's care was discussed with bedside RN and patient during Care Team Rounds.    Patient was discussed and evaluated with Dr. Mohamud Marks MD PGY2  Internal Medicine Resident  Pager: [phone]    ___________________________________________________________________    Subjective & Interval History:     Last 24 hr care team notes reviewed. No acute events overnight. This morning brief episode of dyspnea. No chest pain or shortness of breath during writer's interview. No abdominal pain, nausea or  "vomiting.    Medications: Reviewed in EPIC. List below for reference    Physical Exam:    Blood pressure (!) 154/92, pulse 102, temperature 98.9  F (37.2  C), temperature source Oral, resp. rate 18, height 1.753 m (5' 9\"), weight 85.5 kg (188 lb 9.6 oz), SpO2 97 %.    CONSTITUTIONAL:  Mr. Nicholson is lying down in bed in no apparent distress on exam.  HEENT:  NC/AT.  OP Clear.  MMM. PERRLA.  EOMI.   NECK: Supple, no cervical LAD, JVD 1/2 to angle of the jaw.  LUNGS: No increased work of breathing, faint basilar crackles  CARDIOVASCULAR: RRR w/ no M/R/G.   ABDOMEN: Soft, ND, NT, BS +ve.   MUSCULOSKELETAL:  Moves all four extremities without difficulty.  DP pulses intact.  1+ edema at the ankles.  NEURO: A&Ox3, CN II-XII grossly intact, non-focal.   PSYCHIATRIC:  Normal affect.  SKIN: Warm, Dry, No rashes.     Lines/Tubes:   Peripheral IV 06/26/17 Left Upper forearm (Active)   Site Assessment WDL 6/26/2017  4:00 PM   Line Status Saline locked 6/26/2017  4:00 PM   Phlebitis Scale 0-->no symptoms 6/26/2017  4:00 PM   Infiltration Scale 0 6/26/2017  4:00 PM   Extravasation? No 6/26/2017  7:00 AM   Number of days:0       Labs & Studies of Note: Reviewed in Epic  CMP  Recent Labs  Lab 06/28/17  0600 06/27/17  1944 06/27/17  0638 06/26/17  1826  06/21/17  1058    141 141 144  < > 139   POTASSIUM 3.4 3.7 3.5 4.3  < > 3.8   CHLORIDE 104 106 108 109  < > 106   CO2 24 22 23 25  < > 21   ANIONGAP 12 13 11 10  < > 12   * 217* 132* 182*  < > 140*   * 114* 112* 112*  < > 140*   CR 5.56* 5.44* 5.13* 4.90*  < > 5.28*   GFRESTIMATED 10* 11* 11* 12*  < > 11*   GFRESTBLACK 13* 13* 14* 15*  < > 13*   TRINI 9.0 9.1 8.8 8.5  < > 8.4*   MAG 2.1 2.4* 2.4* 1.5*  --   --    PHOS  --   --   --   --   --  5.0*   ALBUMIN  --   --   --   --   --  2.9*   < > = values in this interval not displayed.    CBC    Recent Labs  Lab 06/26/17  0140 06/21/17  1058   WBC 8.3 6.0   RBC 3.23* 3.21*   HGB 9.4* 9.8*   HCT 30.2* 29.5*    " 183       INRNo lab results found in last 7 days.    Unresulted Labs Ordered in the Past 30 Days of this Admission     Date and Time Order Name Status Description    6/26/2017 0257 Blood culture Preliminary     6/26/2017 0257 Blood culture Preliminary           Medication List for Reference (delete if desired)  Current Facility-Administered Medications   Medication     furosemide (LASIX) 100 mg in NaCl 0.9 % 100 mL infusion     potassium chloride SA (K-DUR/KLOR-CON M) CR tablet 20-40 mEq     potassium chloride (KLOR-CON) Packet 20-40 mEq     potassium chloride 10 mEq in 100 mL intermittent infusion     potassium chloride 10 mEq in 100 mL intermittent infusion with 10 mg lidocaine     potassium chloride 20 mEq in 50 mL intermittent infusion     magnesium sulfate 4 g in 100 mL sterile water (premade)     insulin glargine (LANTUS) injection 3 Units     lidocaine 1 % 1 mL     lidocaine (LMX4) kit     sodium chloride (PF) 0.9% PF flush 3 mL     sodium chloride (PF) 0.9% PF flush 3 mL     medication instruction     acetaminophen (TYLENOL) tablet 325-650 mg     albuterol (PROAIR HFA/PROVENTIL HFA/VENTOLIN HFA) Inhaler 2 puff     allopurinol (ZYLOPRIM) tablet 200 mg     aspirin chewable tablet 81 mg     atorvastatin (LIPITOR) tablet 40 mg     calcitRIOL (ROCALTROL) capsule 0.25 mcg     docusate sodium (COLACE) capsule 100 mg     hydrALAZINE (APRESOLINE) tablet 50 mg     isosorbide mononitrate (IMDUR) 24 hr tablet 60 mg     losartan (COZAAR) tablet 100 mg     metoprolol (TOPROL-XL) 24 hr tablet 100 mg     omeprazole (priLOSEC) CR capsule 20 mg     potassium chloride SA (K-DUR/KLOR-CON M) CR tablet 40 mEq     predniSONE (DELTASONE) tablet 10 mg     sodium bicarbonate tablet 1,300 mg     glucose 40 % gel 15-30 g    Or     dextrose 50 % injection 25-50 mL    Or     glucagon injection 1 mg     insulin aspart (NovoLOG) inj (RAPID ACTING)     insulin aspart (NovoLOG) inj (RAPID ACTING)     Facility-Administered Medications Ordered  in Other Encounters   Medication     bupivacaine 0.25 % - EPINEPHrine 1:200,000 injection     I very much appreciated the opportunity to see and assess Mr Murray Nicholson in follow-up in the hospital with CV Fellow Dr Phillips and resident Dr Marks. Mr Nicholson is improving with diuresis despite renal issues. He is not yet at dry wt but feels better.  We will continue diuresis with trial of oral agents    I agree with the note above which summarizes my findings and current recommendations.  Please do not hesitate to contact my office if you have any questions or concerns.      Darvin Mallory MD  Cardiac Arrhythmia Service  [facility]  [phone]

## 2017-06-28 NOTE — PROGRESS NOTES
D: CAD, ICM, CKD 5, DM2, HTN, AAA, admitted with SOB right sided chest pain    I: Monitored vitals and assessed pt status.   Changed:-  Running:-  PRN:-    A: A0x4. VSS. Afebrile. HF exacerbation post PD cath placement.  Pt calm and cooperative. Needs PD dressing change tomorrow by PD nurse, NOT, 6c nurse.     P: Continue to monitor Pt status and report changes to treatment team.

## 2017-06-28 NOTE — PLAN OF CARE
Problem: Goal Outcome Summary  Goal: Goal Outcome Summary  Outcome: Improving  Shift summary    D:  Pt with PMHx of CKD 5 due to DM/HTN, CAD s/p CABG, and ICM presenting for evaluation of volume overload.      A/I: Pt is A&Ox4. Pt VSS on RA. Pt placed on 1 LNC overnight per pt request for comfort. Pt is up independently. Pt is voiding adequate amounts in urinal overnight. Pt is able to make needs known and uses call light appropriately. Pt reports no pain or SOB. Pt continues on Lasix gtt running at 10 mg hour. Pt's lung sounds remain clear. Mild edema noted in both LE. Pt in SR. VSS. Heart failure teaching materials in room. PD dressing to be changed Thursday. Dressing has scant amount of dried drainage that is unchanged. At 0645 pt reports coughing up sputum. Sputum is pink/red. Scant amount. Pt reports this is not new. Saved the sputum in a cup. Will show to team today.    P: RN will continue to monitor and assess. RN will update team with any changes/concerns. Chelle [address]

## 2017-06-29 LAB
ANION GAP SERPL CALCULATED.3IONS-SCNC: 8 MMOL/L (ref 3–14)
ANION GAP SERPL CALCULATED.3IONS-SCNC: 9 MMOL/L (ref 3–14)
BUN SERPL-MCNC: 119 MG/DL (ref 7–30)
BUN SERPL-MCNC: 141 MG/DL (ref 7–30)
CALCIUM SERPL-MCNC: 8.7 MG/DL (ref 8.5–10.1)
CALCIUM SERPL-MCNC: 9.1 MG/DL (ref 8.5–10.1)
CHLORIDE SERPL-SCNC: 102 MMOL/L (ref 94–109)
CHLORIDE SERPL-SCNC: 104 MMOL/L (ref 94–109)
CO2 SERPL-SCNC: 27 MMOL/L (ref 20–32)
CO2 SERPL-SCNC: 28 MMOL/L (ref 20–32)
CREAT SERPL-MCNC: 5.68 MG/DL (ref 0.66–1.25)
CREAT SERPL-MCNC: 5.78 MG/DL (ref 0.66–1.25)
GFR SERPL CREATININE-BSD FRML MDRD: 10 ML/MIN/1.7M2
GFR SERPL CREATININE-BSD FRML MDRD: 10 ML/MIN/1.7M2
GLUCOSE BLDC GLUCOMTR-MCNC: 132 MG/DL (ref 70–99)
GLUCOSE BLDC GLUCOMTR-MCNC: 213 MG/DL (ref 70–99)
GLUCOSE BLDC GLUCOMTR-MCNC: 249 MG/DL (ref 70–99)
GLUCOSE BLDC GLUCOMTR-MCNC: 277 MG/DL (ref 70–99)
GLUCOSE SERPL-MCNC: 142 MG/DL (ref 70–99)
GLUCOSE SERPL-MCNC: 212 MG/DL (ref 70–99)
MAGNESIUM SERPL-MCNC: 2 MG/DL (ref 1.6–2.3)
MAGNESIUM SERPL-MCNC: 2.2 MG/DL (ref 1.6–2.3)
POTASSIUM SERPL-SCNC: 3.6 MMOL/L (ref 3.4–5.3)
POTASSIUM SERPL-SCNC: 4.5 MMOL/L (ref 3.4–5.3)
SODIUM SERPL-SCNC: 138 MMOL/L (ref 133–144)
SODIUM SERPL-SCNC: 140 MMOL/L (ref 133–144)

## 2017-06-29 PROCEDURE — 25000132 ZZH RX MED GY IP 250 OP 250 PS 637: Performed by: STUDENT IN AN ORGANIZED HEALTH CARE EDUCATION/TRAINING PROGRAM

## 2017-06-29 PROCEDURE — 25000128 H RX IP 250 OP 636: Performed by: STUDENT IN AN ORGANIZED HEALTH CARE EDUCATION/TRAINING PROGRAM

## 2017-06-29 PROCEDURE — 40000095 ZZH STATISTIC LEVEL 2 EST PATIENT

## 2017-06-29 PROCEDURE — 00000146 ZZHCL STATISTIC GLUCOSE BY METER IP

## 2017-06-29 PROCEDURE — 36415 COLL VENOUS BLD VENIPUNCTURE: CPT | Performed by: STUDENT IN AN ORGANIZED HEALTH CARE EDUCATION/TRAINING PROGRAM

## 2017-06-29 PROCEDURE — 83735 ASSAY OF MAGNESIUM: CPT | Performed by: STUDENT IN AN ORGANIZED HEALTH CARE EDUCATION/TRAINING PROGRAM

## 2017-06-29 PROCEDURE — 40000141 ZZH STATISTIC PERIPHERAL IV START W/O US GUIDANCE

## 2017-06-29 PROCEDURE — 80048 BASIC METABOLIC PNL TOTAL CA: CPT | Performed by: STUDENT IN AN ORGANIZED HEALTH CARE EDUCATION/TRAINING PROGRAM

## 2017-06-29 PROCEDURE — 99232 SBSQ HOSP IP/OBS MODERATE 35: CPT | Mod: GC | Performed by: INTERNAL MEDICINE

## 2017-06-29 PROCEDURE — 25000125 ZZHC RX 250: Performed by: STUDENT IN AN ORGANIZED HEALTH CARE EDUCATION/TRAINING PROGRAM

## 2017-06-29 PROCEDURE — 40000802 ZZH SITE CHECK

## 2017-06-29 PROCEDURE — 21400006 ZZH R&B CCU INTERMEDIATE UMMC

## 2017-06-29 PROCEDURE — S0169 CALCITROL: HCPCS | Performed by: STUDENT IN AN ORGANIZED HEALTH CARE EDUCATION/TRAINING PROGRAM

## 2017-06-29 RX ORDER — METOLAZONE 2.5 MG/1
2.5 TABLET ORAL ONCE
Status: COMPLETED | OUTPATIENT
Start: 2017-06-29 | End: 2017-06-29

## 2017-06-29 RX ORDER — FUROSEMIDE 10 MG/ML
120 INJECTION INTRAMUSCULAR; INTRAVENOUS ONCE
Status: COMPLETED | OUTPATIENT
Start: 2017-06-29 | End: 2017-06-29

## 2017-06-29 RX ORDER — POTASSIUM CL/LIDO/0.9 % NACL 10MEQ/0.1L
10 INTRAVENOUS SOLUTION, PIGGYBACK (ML) INTRAVENOUS
Status: DISCONTINUED | OUTPATIENT
Start: 2017-06-29 | End: 2017-06-29

## 2017-06-29 RX ORDER — POTASSIUM CL/LIDO/0.9 % NACL 10MEQ/0.1L
10 INTRAVENOUS SOLUTION, PIGGYBACK (ML) INTRAVENOUS ONCE
Status: COMPLETED | OUTPATIENT
Start: 2017-06-29 | End: 2017-06-29

## 2017-06-29 RX ORDER — POTASSIUM CHLORIDE 750 MG/1
40 TABLET, EXTENDED RELEASE ORAL ONCE
Status: COMPLETED | OUTPATIENT
Start: 2017-06-29 | End: 2017-06-29

## 2017-06-29 RX ADMIN — INSULIN ASPART 1 UNITS: 100 INJECTION, SOLUTION INTRAVENOUS; SUBCUTANEOUS at 18:49

## 2017-06-29 RX ADMIN — METOLAZONE 2.5 MG: 2.5 TABLET ORAL at 06:08

## 2017-06-29 RX ADMIN — DOCUSATE SODIUM 100 MG: 100 CAPSULE, LIQUID FILLED ORAL at 08:25

## 2017-06-29 RX ADMIN — SODIUM BICARBONATE 650 MG TABLET 1300 MG: at 17:11

## 2017-06-29 RX ADMIN — SODIUM BICARBONATE 650 MG TABLET 1300 MG: at 21:35

## 2017-06-29 RX ADMIN — POTASSIUM CHLORIDE 10 MEQ: 14.9 INJECTION, SOLUTION, CONCENTRATE PARENTERAL at 02:00

## 2017-06-29 RX ADMIN — ATORVASTATIN CALCIUM 40 MG: 40 TABLET, FILM COATED ORAL at 08:26

## 2017-06-29 RX ADMIN — HYDRALAZINE HYDROCHLORIDE 50 MG: 50 TABLET ORAL at 17:11

## 2017-06-29 RX ADMIN — INSULIN GLARGINE 3 UNITS: 100 INJECTION, SOLUTION SUBCUTANEOUS at 21:35

## 2017-06-29 RX ADMIN — POTASSIUM CHLORIDE 40 MEQ: 750 TABLET, EXTENDED RELEASE ORAL at 01:59

## 2017-06-29 RX ADMIN — FUROSEMIDE 120 MG: 10 INJECTION, SOLUTION INTRAVENOUS at 13:08

## 2017-06-29 RX ADMIN — ASPIRIN 81 MG CHEWABLE TABLET 81 MG: 81 TABLET CHEWABLE at 08:25

## 2017-06-29 RX ADMIN — FUROSEMIDE 10 MG/HR: 10 INJECTION, SOLUTION INTRAVENOUS at 12:30

## 2017-06-29 RX ADMIN — METOPROLOL SUCCINATE 100 MG: 100 TABLET, EXTENDED RELEASE ORAL at 08:26

## 2017-06-29 RX ADMIN — OMEPRAZOLE 20 MG: 20 CAPSULE, DELAYED RELEASE ORAL at 08:25

## 2017-06-29 RX ADMIN — ISOSORBIDE MONONITRATE 60 MG: 60 TABLET, EXTENDED RELEASE ORAL at 08:27

## 2017-06-29 RX ADMIN — SODIUM BICARBONATE 650 MG TABLET 1300 MG: at 08:25

## 2017-06-29 RX ADMIN — FUROSEMIDE 10 MG/HR: 10 INJECTION, SOLUTION INTRAVENOUS at 21:40

## 2017-06-29 RX ADMIN — HYDRALAZINE HYDROCHLORIDE 50 MG: 50 TABLET ORAL at 21:35

## 2017-06-29 RX ADMIN — ALLOPURINOL 200 MG: 100 TABLET ORAL at 08:25

## 2017-06-29 RX ADMIN — CALCITRIOL 0.25 MCG: 0.25 CAPSULE, LIQUID FILLED ORAL at 08:24

## 2017-06-29 RX ADMIN — HYDRALAZINE HYDROCHLORIDE 50 MG: 50 TABLET ORAL at 08:27

## 2017-06-29 RX ADMIN — BUMETANIDE 4 MG: 2 TABLET ORAL at 08:24

## 2017-06-29 RX ADMIN — INSULIN ASPART 2 UNITS: 100 INJECTION, SOLUTION INTRAVENOUS; SUBCUTANEOUS at 13:27

## 2017-06-29 RX ADMIN — PREDNISONE 10 MG: 10 TABLET ORAL at 08:25

## 2017-06-29 RX ADMIN — LOSARTAN POTASSIUM 100 MG: 50 TABLET, FILM COATED ORAL at 08:24

## 2017-06-29 RX ADMIN — POTASSIUM CHLORIDE 40 MEQ: 750 TABLET, EXTENDED RELEASE ORAL at 08:26

## 2017-06-29 RX ADMIN — DOCUSATE SODIUM 100 MG: 100 CAPSULE, LIQUID FILLED ORAL at 21:35

## 2017-06-29 RX ADMIN — ACETAMINOPHEN 650 MG: 325 TABLET, FILM COATED ORAL at 20:43

## 2017-06-29 ASSESSMENT — PAIN DESCRIPTION - DESCRIPTORS
DESCRIPTORS: DISCOMFORT
DESCRIPTORS: DISCOMFORT

## 2017-06-29 NOTE — PROGRESS NOTES
Cardiology Daily Note   Date of Service: 6/29/2017  Patient: Murray Nicholson  MRN: [id number]  Admission Date: 6/26/2017  Hospital Day # 3    Assessment & Plan:   Murray Nicholson is a 62 year old male with PMHx significant for CAD, ischemic cardiomyopathy (EF20-25%), HFrEF, CKD V, T2DM, HTN, ascending aortic aneurysm who was admitted with atypical right sided chest pain and mild heart failure exacerbation.        Changes Today:  - Resume lasix gtt  - Monitor I/Os, goal net -ve 1.5L/24h  - BID lytes check     # Acute on chronic systolic heart failure 2/2 ICM (EF 20-25%):  Patient endorsed orthopnea, increased abdominal girth, elevated NT-BNP and weight up approx 10lbs. Dry weight ~ 181-182lbs.  - Daily weights, strict I/Os  - BID lytes  - Diuretics: Lasix gtt, Metolazone PRN; - Goal net -ve 1.5L  - Continue daily ASA, Toprol, hydralazine, losartan Statin    # Right lung infiltrate:  Noted on CXR. No leukocytosis, fevers, cough, hypoxia. Procal low. Low suspicion for PNA.  - Will hold on further abx for now    # CKD V:  PD catheter placed last week. Still making urine.  - Avoid nephrotoxic agents  - Continue home bicarb and calcitriol    CHRONIC CONDITIONS, STABLE:  # Type II DM: Hold orals, SSI  # Gout: Continue home allopurinol    Consulting Services: None    CODE: Full  DVT Ppx: None, ambulate  Diet/Fluids: 2mg Na, 1.8L fluid  Disposition: Home tomorrow.    Pt's care was discussed with bedside RN and patient during Care Team Rounds.    Patient was discussed and evaluated with Dr. Mohamud Marks MD PGY2  Internal Medicine Resident  Pager: [phone]    ___________________________________________________________________    Subjective & Interval History:     Last 24 hr care team notes reviewed. No acute events overnight. Eager to go home, feels much improved but weight still above dry weight. Agreeable to continue diuresis with anticipation of discharge to home tomorrow.    Medications: Reviewed in  "EPIC. List below for reference    Physical Exam:    Blood pressure 142/81, pulse 102, temperature 98.6  F (37  C), temperature source Oral, resp. rate 18, height 1.753 m (5' 9\"), weight 84.6 kg (186 lb 8 oz), SpO2 99 %.    CONSTITUTIONAL:  Mr. Nicholson is lying down in bed in no apparent distress on exam.  HEENT:  NC/AT.  OP Clear.  MMM. PERRLA.  EOMI.   LUNGS: No increased work of breathing  CARDIOVASCULAR: RRR w/ no M/R/G.   ABDOMEN: Soft, ND, NT, BS +ve.   MUSCULOSKELETAL:  Moves all four extremities without difficulty.  DP pulses intact.  Trace edema at the ankles.  NEURO: A&Ox3, CN II-XII grossly intact, non-focal.   PSYCHIATRIC:  Normal affect.  SKIN: Warm, Dry, No rashes.     Lines/Tubes:   Peripheral IV 06/26/17 Left Upper forearm (Active)   Site Assessment WDL 6/26/2017  4:00 PM   Line Status Saline locked 6/26/2017  4:00 PM   Phlebitis Scale 0-->no symptoms 6/26/2017  4:00 PM   Infiltration Scale 0 6/26/2017  4:00 PM   Extravasation? No 6/26/2017  7:00 AM   Number of days:0       Labs & Studies of Note: Reviewed in Epic  CMP    Recent Labs  Lab 06/29/17  0537 06/28/17  1755 06/28/17  0600 06/27/17  1944    137 140 141   POTASSIUM 3.6 3.7 3.4 3.7   CHLORIDE 104 101 104 106   CO2 27 24 24 22   ANIONGAP 9 11 12 13   * 263* 118* 217*   * 116* 124* 114*   CR 5.78* 5.73* 5.56* 5.44*   GFRESTIMATED 10* 10* 10* 11*   GFRESTBLACK 12* 12* 13* 13*   TRINI 8.7 9.0 9.0 9.1   MAG 2.2 2.2 2.1 2.4*       CBC    Recent Labs  Lab 06/26/17  0140   WBC 8.3   RBC 3.23*   HGB 9.4*   HCT 30.2*          INRNo lab results found in last 7 days.    Unresulted Labs Ordered in the Past 30 Days of this Admission     Date and Time Order Name Status Description    6/26/2017 0257 Blood culture Preliminary     6/26/2017 0257 Blood culture Preliminary           Medication List for Reference (delete if desired)  Current Facility-Administered Medications   Medication     furosemide (LASIX) 100 mg in NaCl 0.9 % 100 mL " infusion     potassium chloride SA (K-DUR/KLOR-CON M) CR tablet 20-40 mEq     potassium chloride (KLOR-CON) Packet 20-40 mEq     potassium chloride 10 mEq in 100 mL intermittent infusion     potassium chloride 10 mEq in 100 mL intermittent infusion with 10 mg lidocaine     potassium chloride 20 mEq in 50 mL intermittent infusion     magnesium sulfate 4 g in 100 mL sterile water (premade)     insulin glargine (LANTUS) injection 3 Units     lidocaine 1 % 1 mL     lidocaine (LMX4) kit     sodium chloride (PF) 0.9% PF flush 3 mL     sodium chloride (PF) 0.9% PF flush 3 mL     medication instruction     acetaminophen (TYLENOL) tablet 325-650 mg     albuterol (PROAIR HFA/PROVENTIL HFA/VENTOLIN HFA) Inhaler 2 puff     allopurinol (ZYLOPRIM) tablet 200 mg     aspirin chewable tablet 81 mg     atorvastatin (LIPITOR) tablet 40 mg     calcitRIOL (ROCALTROL) capsule 0.25 mcg     docusate sodium (COLACE) capsule 100 mg     hydrALAZINE (APRESOLINE) tablet 50 mg     isosorbide mononitrate (IMDUR) 24 hr tablet 60 mg     losartan (COZAAR) tablet 100 mg     metoprolol (TOPROL-XL) 24 hr tablet 100 mg     omeprazole (priLOSEC) CR capsule 20 mg     potassium chloride SA (K-DUR/KLOR-CON M) CR tablet 40 mEq     predniSONE (DELTASONE) tablet 10 mg     sodium bicarbonate tablet 1,300 mg     glucose 40 % gel 15-30 g    Or     dextrose 50 % injection 25-50 mL    Or     glucagon injection 1 mg     insulin aspart (NovoLOG) inj (RAPID ACTING)     insulin aspart (NovoLOG) inj (RAPID ACTING)     Facility-Administered Medications Ordered in Other Encounters   Medication     bupivacaine 0.25 % - EPINEPHrine 1:200,000 injection     I very much appreciated the opportunity to see and assess Mr Murray Nicholson once again in the hospital with CV Fellow Dr Phillips and resident Dr Marks. Patient was concerned that our switch to oral diuretics was ineffective which is correct. He is anxious to go home. Consequently we agreed to go with IV diuretics today with  goal of discharge tomorrow.    I agree with the note above which summarizes my findings and current recommendations.  Please do not hesitate to contact my office if you have any questions or concerns.      Darvin Mallory MD  Cardiac Arrhythmia Service  [facility]  [phone]

## 2017-06-29 NOTE — PLAN OF CARE
Problem: Goal Outcome Summary  Goal: Goal Outcome Summary  Outcome: No Change  Pt switched from IV lasix gtt to po bumex. Pt has received 2 doses today with minimal output. Card1 called and updated and will be up to see pt. Pt denies any c/o SOB. Pt has no edema . Pt has been in SR, VSS. Pt's FSBG have been running in the 200's. Pt started on lantus yesterday but very small dose. Will continue to monitor. POC await cards 1 recommendations, continue monitoring I and O's closely. Monitor labs.

## 2017-06-29 NOTE — PROGRESS NOTES
PD sterile dressing change completed d/t 1wk post catheter insertion.  NORRIS 0, healing well, no redness, swelling, drainage or tenderness noted.  Tunnel palpated, WDL.  PD catheter healing well, no ssx infection or complications.  Catheter remained clamped and anchored to pt abd.  Primapore dressing reinforced with tape, catheter coiled over dressing and taped to abd.  Educated pt to keep dressing intact and dry.  Pt instructed not to shower or remove dressing for 1 more week until seen by outpatient PD nurse and instructed to do differently.  Educated that PD catheter cuffs are still healing and that's why dressing needs to remain intact and sterile.  Gave pt dressings and tape to bring home and instructed to reinforce and contact PD clinic if dressing becomes wet or starts to come off.  Pt demonstrated understanding with read back.  Pt reports f/u appointment with surgeon on 7/5/17 and f/u appointment with PD clinic on 7/7/17.  No concerns noted at this time.  Hand off given to floor RN.

## 2017-06-29 NOTE — PLAN OF CARE
Problem: Goal Outcome Summary  Goal: Goal Outcome Summary  Outcome: No Change  D: Heart failure, ischemic cardiomyopathy, CKD stV  I/A: VSS. Denies pain except for some leg discomfort from exercising the other day--PRN tylenol given. Sinus rhythm. Minimal urine output this shift after PRN Metolazone. Pt had a 33 beat run of Vtach at about 0140. Vitals obtained and stable, denied pain, easy to arouse (was asleep at the time). MD notified and ordered K+ 40mEq oral and 10mEq IV with Lidocaine. Given. Pt has had no further episodes thus far.  P: Continue to monitor.

## 2017-06-29 NOTE — PLAN OF CARE
Problem: Goal Outcome Summary  Goal: Goal Outcome Summary  Outcome: Improving  Pt is alert, oriented and able to make his needs known.   Heart rhythm has been in a sinus rhythm.  LS diminished in lower lobes. Pt denies SOB or difficulty breathing.   IV Lasix restarted as ordered by provider. Pt tolerated well. Urine production increased.   , 249, and 213. Insulin administered as ordered.   Will continue to assess heart rhythm, labs, and fluid status. Provider will be notified of changes and concerns.

## 2017-06-30 VITALS
DIASTOLIC BLOOD PRESSURE: 89 MMHG | TEMPERATURE: 98.4 F | SYSTOLIC BLOOD PRESSURE: 146 MMHG | HEART RATE: 86 BPM | HEIGHT: 69 IN | WEIGHT: 185.2 LBS | BODY MASS INDEX: 27.43 KG/M2 | OXYGEN SATURATION: 99 % | RESPIRATION RATE: 16 BRPM

## 2017-06-30 LAB
ANION GAP SERPL CALCULATED.3IONS-SCNC: 12 MMOL/L (ref 3–14)
BUN SERPL-MCNC: 133 MG/DL (ref 7–30)
CALCIUM SERPL-MCNC: 8.9 MG/DL (ref 8.5–10.1)
CHLORIDE SERPL-SCNC: 103 MMOL/L (ref 94–109)
CO2 SERPL-SCNC: 27 MMOL/L (ref 20–32)
CREAT SERPL-MCNC: 5.69 MG/DL (ref 0.66–1.25)
GFR SERPL CREATININE-BSD FRML MDRD: 10 ML/MIN/1.7M2
GLUCOSE SERPL-MCNC: 137 MG/DL (ref 70–99)
MAGNESIUM SERPL-MCNC: 2.2 MG/DL (ref 1.6–2.3)
POTASSIUM SERPL-SCNC: 3.3 MMOL/L (ref 3.4–5.3)
SODIUM SERPL-SCNC: 142 MMOL/L (ref 133–144)

## 2017-06-30 PROCEDURE — 25000132 ZZH RX MED GY IP 250 OP 250 PS 637: Performed by: INTERNAL MEDICINE

## 2017-06-30 PROCEDURE — 36415 COLL VENOUS BLD VENIPUNCTURE: CPT | Performed by: STUDENT IN AN ORGANIZED HEALTH CARE EDUCATION/TRAINING PROGRAM

## 2017-06-30 PROCEDURE — 25000125 ZZHC RX 250: Performed by: STUDENT IN AN ORGANIZED HEALTH CARE EDUCATION/TRAINING PROGRAM

## 2017-06-30 PROCEDURE — 83735 ASSAY OF MAGNESIUM: CPT | Performed by: STUDENT IN AN ORGANIZED HEALTH CARE EDUCATION/TRAINING PROGRAM

## 2017-06-30 PROCEDURE — 25000132 ZZH RX MED GY IP 250 OP 250 PS 637: Performed by: STUDENT IN AN ORGANIZED HEALTH CARE EDUCATION/TRAINING PROGRAM

## 2017-06-30 PROCEDURE — 99238 HOSP IP/OBS DSCHRG MGMT 30/<: CPT | Mod: GC | Performed by: INTERNAL MEDICINE

## 2017-06-30 PROCEDURE — 80048 BASIC METABOLIC PNL TOTAL CA: CPT | Performed by: STUDENT IN AN ORGANIZED HEALTH CARE EDUCATION/TRAINING PROGRAM

## 2017-06-30 PROCEDURE — S0169 CALCITROL: HCPCS | Performed by: STUDENT IN AN ORGANIZED HEALTH CARE EDUCATION/TRAINING PROGRAM

## 2017-06-30 RX ORDER — METOLAZONE 2.5 MG/1
2.5 TABLET ORAL ONCE
Status: COMPLETED | OUTPATIENT
Start: 2017-06-30 | End: 2017-06-30

## 2017-06-30 RX ORDER — POTASSIUM CHLORIDE 1500 MG/1
TABLET, EXTENDED RELEASE ORAL
Qty: 90 TABLET | Refills: 0 | Status: SHIPPED | OUTPATIENT
Start: 2017-06-30 | End: 2017-07-29

## 2017-06-30 RX ORDER — BUMETANIDE 2 MG/1
6 TABLET ORAL
Status: DISCONTINUED | OUTPATIENT
Start: 2017-06-30 | End: 2017-06-30 | Stop reason: HOSPADM

## 2017-06-30 RX ORDER — POTASSIUM CHLORIDE 750 MG/1
40 TABLET, EXTENDED RELEASE ORAL ONCE
Status: DISCONTINUED | OUTPATIENT
Start: 2017-06-30 | End: 2017-06-30

## 2017-06-30 RX ORDER — AMLODIPINE BESYLATE 5 MG/1
5 TABLET ORAL DAILY
Qty: 30 TABLET | Refills: 3 | Status: SHIPPED | OUTPATIENT
Start: 2017-06-30 | End: 2017-11-06

## 2017-06-30 RX ORDER — POTASSIUM CHLORIDE 1500 MG/1
40 TABLET, EXTENDED RELEASE ORAL ONCE
Qty: 2 TABLET | Refills: 0 | Status: SHIPPED | OUTPATIENT
Start: 2017-06-30 | End: 2017-06-30

## 2017-06-30 RX ORDER — AMLODIPINE BESYLATE 5 MG/1
5 TABLET ORAL DAILY
Status: DISCONTINUED | OUTPATIENT
Start: 2017-06-30 | End: 2017-06-30 | Stop reason: HOSPADM

## 2017-06-30 RX ORDER — METOLAZONE 2.5 MG/1
2.5 TABLET ORAL EVERY OTHER DAY
Qty: 90 TABLET | Refills: 1 | Status: SHIPPED | OUTPATIENT
Start: 2017-06-30 | End: 2017-11-16

## 2017-06-30 RX ORDER — POTASSIUM CHLORIDE 750 MG/1
20 TABLET, EXTENDED RELEASE ORAL ONCE
Status: COMPLETED | OUTPATIENT
Start: 2017-06-30 | End: 2017-06-30

## 2017-06-30 RX ORDER — BUMETANIDE 2 MG/1
6 TABLET ORAL
Qty: 180 TABLET | Refills: 3 | Status: SHIPPED | OUTPATIENT
Start: 2017-06-30 | End: 2017-11-04

## 2017-06-30 RX ORDER — METOLAZONE 2.5 MG/1
2.5 TABLET ORAL EVERY OTHER DAY
Status: DISCONTINUED | OUTPATIENT
Start: 2017-06-30 | End: 2017-06-30 | Stop reason: HOSPADM

## 2017-06-30 RX ADMIN — METOPROLOL SUCCINATE 100 MG: 100 TABLET, EXTENDED RELEASE ORAL at 08:04

## 2017-06-30 RX ADMIN — CALCITRIOL 0.25 MCG: 0.25 CAPSULE, LIQUID FILLED ORAL at 07:53

## 2017-06-30 RX ADMIN — ALLOPURINOL 200 MG: 100 TABLET ORAL at 07:54

## 2017-06-30 RX ADMIN — ISOSORBIDE MONONITRATE 60 MG: 60 TABLET, EXTENDED RELEASE ORAL at 08:04

## 2017-06-30 RX ADMIN — AMLODIPINE BESYLATE 5 MG: 5 TABLET ORAL at 11:26

## 2017-06-30 RX ADMIN — HYDRALAZINE HYDROCHLORIDE 50 MG: 50 TABLET ORAL at 08:03

## 2017-06-30 RX ADMIN — OMEPRAZOLE 20 MG: 20 CAPSULE, DELAYED RELEASE ORAL at 07:53

## 2017-06-30 RX ADMIN — POTASSIUM CHLORIDE 40 MEQ: 750 TABLET, EXTENDED RELEASE ORAL at 07:54

## 2017-06-30 RX ADMIN — BUMETANIDE 6 MG: 2 TABLET ORAL at 10:38

## 2017-06-30 RX ADMIN — PREDNISONE 10 MG: 10 TABLET ORAL at 07:54

## 2017-06-30 RX ADMIN — ASPIRIN 81 MG CHEWABLE TABLET 81 MG: 81 TABLET CHEWABLE at 07:54

## 2017-06-30 RX ADMIN — METOLAZONE 2.5 MG: 2.5 TABLET ORAL at 10:38

## 2017-06-30 RX ADMIN — SODIUM BICARBONATE 650 MG TABLET 1300 MG: at 07:53

## 2017-06-30 RX ADMIN — LOSARTAN POTASSIUM 100 MG: 50 TABLET, FILM COATED ORAL at 08:03

## 2017-06-30 RX ADMIN — METOLAZONE 2.5 MG: 2.5 TABLET ORAL at 02:56

## 2017-06-30 RX ADMIN — ATORVASTATIN CALCIUM 40 MG: 40 TABLET, FILM COATED ORAL at 07:54

## 2017-06-30 RX ADMIN — ACETAMINOPHEN 650 MG: 325 TABLET, FILM COATED ORAL at 01:31

## 2017-06-30 RX ADMIN — POTASSIUM CHLORIDE 20 MEQ: 750 TABLET, EXTENDED RELEASE ORAL at 12:01

## 2017-06-30 NOTE — PROGRESS NOTES
DISCHARGE                         6/30/2017 12:06 PM  ----------------------------------------------------------------------------  Discharged to: Home  Via: Automobile  Accompanied by: Friend  Discharge Instructions:  Verbalizes understanding of new medications, f/u cares and CORE instructions.   Prescriptions: To be filled by [facility] in [facility]  Follow Up Appointments: arranged; information given  Belongings: All sent with pt  IV: removed  Telemetry: off  Pt exhibits understanding of above discharge instructions; all questions answered.    Discharge Paperwork: Signed, copied, and sent home with patient.   Ambulated off floor per pt request

## 2017-06-30 NOTE — PLAN OF CARE
"Problem: Goal Outcome Summary  Goal: Goal Outcome Summary  /82 (BP Location: Right arm)  Pulse 86  Temp 98.2  F (36.8  C) (Oral)  Resp 16  Ht 1.753 m (5' 9\")  Wt 84.6 kg (186 lb 8 oz)  SpO2 100%  BMI 27.54 kg/m2  Adm: 6/26 for chest pain, sore throat  Neuro: AO x 4  Cardiac: SR/ST, VSS, lasix gtt @ 10 mL/hr  Respiratory: RA, lung sounds clear  GI/: Voiding adequately; no c/o of nausea  Diet/appetite: 2 g Na diet and 2L FR  Activity: independent, up ad edith  Pain: PRN Tylenol x 1 for soreness in legs  Skin: intact, no deficits noted  Lines: L PIV infusing  Drains: N/A  Plan: Diurese; plan to discharge home today     Will continue to monitor and notify physician of any pertinent changes.      "

## 2017-06-30 NOTE — DISCHARGE SUMMARY
Medicine Discharge Summary  Murray Nicholson MRN: [id number]  1955  Primary care provider: Yahir Turcios  ___________________________________          Date of Admission:  6/26/2017  Date of Discharge:  6/30/2017   Admitting Physician:  Bobby Laguna MD  Discharge Physician:  Dr. Mallory  Discharging Service:  Cards I          Reason for Admission:   Volume overload          Discharge Diagnosis:   Acute on chronic systolic heart failure  Ischemic cardiomyopathy (EF 20-25%)  Chronic kidney disease, stage V  Type II DM         Procedures & Significant Findings:   None         Consultations:   None         Hospital Course by Problem:    Murray Nicholson is a 62 year old male with PMHx significant for CAD, ischemic cardiomyopathy (EF20-25%), HFrEF, CKD V, T2DM, HTN, ascending aortic aneurysm who was admitted with atypical right sided chest pain and mild heart failure exacerbation.     # Acute on chronic systolic heart failure 2/2 ICM (EF 20-25%):  On admission patient endorsed orthopnea, increased abdominal girth, elevated NT-BNP and weight up approx 10lbs. Dry weight ~ 181-182lbs. He was diuresed with lasix gtt (10mg/hr) then transitioned to oral bumex, 6mg twice daily. His weight at discharge was 185lbs, he felt symptomatically much improved. He will continue with strict diet, daily weights and close follow up in CORE clinic.  - Bumex 6mg BID with metolazone every other day  - Continued daily ASA, Toprol, hydralazine, losartan Statin  - Follow up in CORE clinic in the next week     # Right lung infiltrate:  Noted on CXR. No leukocytosis, fevers, cough, hypoxia. Procal low. Low suspicion for PNA. No antibiotics given this admission.    # CKD V:  PD catheter placed last week. Still making urine, creatinine stable with baseline.  - Continued home bicarb and calcitriol     CHRONIC CONDITIONS, STABLE:  # Type II DM: Resumed home medications without changes.  # Gout: Continued home allopurinol.    Physical Exam on day of  "Discharge:  Blood pressure 146/89, pulse 86, temperature 98.4  F (36.9  C), temperature source Oral, resp. rate 16, height 1.753 m (5' 9\"), weight 84 kg (185 lb 3.2 oz), SpO2 99 %.     CONSTITUTIONAL:  Mr. Nicholson is lying down in bed in no apparent distress on exam.  HEENT:  NC/AT.  OP Clear.  MMM. PERRLA.  EOMI.   LUNGS: No increased work of breathing  CARDIOVASCULAR: RRR w/ no M/R/G.   ABDOMEN: Soft, ND, NT, BS +ve.   MUSCULOSKELETAL:  Moves all four extremities without difficulty.  DP pulses intact.  No lower extremity edema.  NEURO: A&Ox3, CN II-XII grossly intact, non-focal.   PSYCHIATRIC:  Normal affect.  SKIN: Warm, Dry, No rashes.      Lines/Tubes:  PD catheter         Pending Results:     Unresulted Labs Ordered in the Past 30 Days of this Admission     Date and Time Order Name Status Description    6/26/2017 0257 Blood culture Preliminary     6/26/2017 0257 Blood culture Preliminary                Discharge Medications:     Discharge Medication List as of 6/30/2017 12:44 PM      START taking these medications    Details   amLODIPine (NORVASC) 5 MG tablet Take 1 tablet (5 mg) by mouth daily, Disp-30 tablet, R-3, E-Prescribe      bumetanide (BUMEX) 2 MG tablet Take 3 tablets (6 mg) by mouth 2 times daily, Disp-180 tablet, R-3, E-Prescribe         CONTINUE these medications which have CHANGED    Details   metolazone (ZAROXOLYN) 2.5 MG tablet Take 1 tablet (2.5 mg) by mouth every other day, Disp-90 tablet, R-1, E-Prescribe      !! potassium chloride SA (K-DUR/KLOR-CON M) 20 MEQ CR tablet Take 2 tablets (40 mEq) by mouth once for 1 dose, Disp-2 tablet, R-0, Local Print      !! potassium chloride SA (K-DUR/KLOR-CON M) 20 MEQ CR tablet Take 40mEq in the morning (2 tabs) and 20mEq (1 tab) in the evening for a total of 60mEq daily., Disp-90 tablet, R-0, E-Prescribe       !! - Potential duplicate medications found. Please discuss with provider.      CONTINUE these medications which have NOT CHANGED    Details "   sodium bicarbonate 650 MG tablet TAKE 2 TABLETS(1300 MG) BY MOUTH THREE TIMES DAILY, Disp-180 tablet, R-0, E-Prescribe      allopurinol (ZYLOPRIM) 100 MG tablet Take 2 tablets (200 mg) by mouth daily Start with 150 mg daily for 2 weeks then go to 200 mg daily., Disp-90 tablet, R-1, E-Prescribe      predniSONE (DELTASONE) 10 MG tablet Take 1 tablet (10 mg) by mouth daily, Disp-30 tablet, R-1, E-Prescribe      calcitRIOL (ROCALTROL) 0.25 MCG capsule Take 1 capsule (0.25 mcg) by mouth daily, Disp-90 capsule, R-0, E-Prescribe      glipiZIDE (GLUCOTROL) 5 MG tablet Take 1 tablet by mouth. TAKE 1 TABLET BY MOUTH DAILY BEFORE A MEAL, Disp-90 tablet, R-0, E-Prescribe      atorvastatin (LIPITOR) 40 MG tablet Take 1 tablet (40 mg) by mouth daily, Disp-90 tablet, R-3, E-Prescribe      isosorbide mononitrate (IMDUR) 60 MG 24 hr tablet Take 1 tablet (60 mg) by mouth daily, Disp-90 tablet, R-3, E-Prescribe      metoprolol (TOPROL XL) 100 MG 24 hr tablet Take 1 tablet (100 mg) by mouth daily, Disp-90 tablet, R-3, E-Prescribe      losartan (COZAAR) 100 MG tablet Take 1 tablet (100 mg) by mouth daily, Disp-90 tablet, R-0, E-Prescribe      hydrALAZINE (APRESOLINE) 50 MG tablet Take 1 tablet (50 mg) by mouth 3 times daily, Disp-270 tablet, R-3, E-Prescribe      acetaminophen (TYLENOL) 325 MG tablet Take 1-2 tablets (325-650 mg) by mouth every 4 hours as needed for mild pain Do not take more than 10 tablets in 24 hours, Disp-100 tablet, R-0, E-Prescribe      omeprazole (PRILOSEC) 20 MG CR capsule Take 20 mg by mouth daily At night, Historical      loratadine (CLARITIN) 10 MG tablet Take 10 mg by mouth daily as needed Reported on 5/3/2017, Historical      ASPIRIN 81 MG OR TABS Take 1 tablet (81 mg) by mouth at bedtime, Historical      docusate sodium (COLACE) 100 MG capsule Take 1 capsule (100 mg) by mouth 2 times daily, Disp-60 capsule, R-1, E-Prescribe      darbepoetin emily (ARANESP, ALBUMIN FREE,) 100 MCG/0.5ML injection Inject 0.5  mLs (100 mcg) Subcutaneous every 14 days As needed for hgb<10g/dL.  If Hgb increases >1 point in 2 weeks (if blood transfusion given, use hgb PRIOR to this), SYSTOLIC BP > 180 mmHg or hgb>=10g/dL, HOLD DOSE. Dose must be within 1 week of Hgb.   Per anemia protocol with Brice Caraballo MD, Disp-0.5 mL, R-99, Injection      albuterol (PROAIR HFA/PROVENTIL HFA/VENTOLIN HFA) 108 (90 BASE) MCG/ACT Inhaler Inhale 2 puffs into the lungs every 6 hours as needed for shortness of breath / dyspnea or wheezing, Disp-1 Inhaler, R-0, E-Prescribe         STOP taking these medications       furosemide (LASIX) 80 MG tablet Comments:   Reason for Stopping:                    Discharge Instructions and Follow-Up:     Discharge Procedure Orders  Medication Therapy Management Referral   Referral Type: Med Therapy Management     Reason for your hospital stay   Order Comments: Atypical chest pain and mild heart failure exacerbation.     Activity   Order Comments: Your activity upon discharge: activity as tolerated   Order Specific Question Answer Comments   Is discharge order? Yes      Monitor and record   Order Comments: Weigh yourself daily, if your weight increases more than 2lbs in one day or more than 5lbs in one week contact your CORE provider and take an extra dose of your diuretic.     Discharge Instructions   Order Comments: 1. Start your new diuretics as instructed.  2. Follow up as instructed above.    Return if you have any new or changed chest pain, increased shortness of breath, weight gain not controlled by your diuretics or as otherwise directed by your cardiologist.     Follow Up and recommended labs and tests   Order Comments: _____________________________________________________    [address]  [place] [address]  Phone [phone]    Rescheduled appointment on 7/7/17 at 12:30 PM.  ________________________________________________________     [facility]ult [facility] Follow-up and recommended  labs and tests   Order Comments: 1. Follow up with primary care provider, Yahir Turcios, within 7 days for hospital follow- up.  The following labs/tests are recommended: BMP, Mg.  2. Follow up in CORE clinic in the next 1-2 weeks.  3. Follow up with nephrology as previously scheduled.    4. Follow up with Dr. Posada with cardiology as previously scheduled.    Appointments on [place] and/or [facility] (with [facility] or [facility] provider or service). Call [phone] if you haven't heard regarding these appointments within 7 days of discharge.     Full Code     Diet   Order Comments: Follow this diet upon discharge: Orders Placed This Encounter     Fluid restriction 2000 ML FLUID     2 Gram Sodium Diet   Order Specific Question Answer Comments   Is discharge order? Yes              Discharge Disposition:   Home         Condition on Discharge:   Discharge condition: Stable   Code status on discharge: Full Code      Date of service: 6/30/2017    The patient was discussed with Dr. Mallory.    Ragini Marks  Internal Medicine PGY2  Pager [phone]    I very much appreciated the opportunity to see and assess Mr Murray Nicholson in the hospital with CV Fellow Dr Phillips and Cards 1  Resident Dr Marks. I discussed with Mr Nicholson the nature of his ongoing diuresis and need for careful follow-up and vigilance re weight . He voiced understanding and agreement    I agree with the note above which summarizes my findings and current recommendations.  Please do not hesitate to contact my office if you have any questions or concerns.      Darvin Mallory MD  Cardiac Arrhythmia Service  [facility]  [phone]

## 2017-07-02 LAB
BACTERIA SPEC CULT: NO GROWTH
BACTERIA SPEC CULT: NO GROWTH
Lab: NORMAL
Lab: NORMAL
MICRO REPORT STATUS: NORMAL
MICRO REPORT STATUS: NORMAL
SPECIMEN SOURCE: NORMAL
SPECIMEN SOURCE: NORMAL

## 2017-07-03 ENCOUNTER — CARE COORDINATION (OUTPATIENT)
Dept: NEPHROLOGY | Facility: CLINIC | Age: 62
End: 2017-07-03

## 2017-07-03 ENCOUNTER — TELEPHONE (OUTPATIENT)
Dept: PHARMACY | Facility: OTHER | Age: 62
End: 2017-07-03

## 2017-07-03 ENCOUNTER — CARE COORDINATION (OUTPATIENT)
Dept: CARDIOLOGY | Facility: CLINIC | Age: 62
End: 2017-07-03

## 2017-07-03 ENCOUNTER — TELEPHONE (OUTPATIENT)
Dept: TRANSPLANT | Facility: CLINIC | Age: 62
End: 2017-07-03

## 2017-07-03 NOTE — TELEPHONE ENCOUNTER
MTM referral from: Transitions of Care (recent hospital discharge or ED visit)    MTM referral outreach attempt #1 on July 3, 2017 at 12:04 PM      Outcome: Left Message    Juliana Abel MTM Coordinator

## 2017-07-03 NOTE — PROGRESS NOTES
Tell me how you have been doing since you were discharged from the hospital.  Patient stated he is doing well and has continued to lose weight.        FOLLOW UP  Do you have a follow up appointment with your provider?   What other discharge instructions do you have?   Are you to get labs, procedures or tests before you see your provider?      You are scheduled to see Janett Martini NP on 7-5-17 for follow up post hospital visit at 12:30 PM.     You are also scheduled for the following lab on 7-5-17 at noon.         CONTACT INFORMATION  Please feel free to call us with any other questions or symptoms that are concerning for you at [phone], if it is after 4:30 in the afternoon, or a weekend please call [phone] and ask for the on call specialist.  We want to do everything we can to help prevent you needing to return to the ED, so please do not hesitate to call us.       HEART FAILURE PATIENTS  Please weigh yourself daily and record your weights.  If you gain 2 pounds in 24 hours or 5 pounds in one week please call cardiology at [phone].     Patient stated he got down to his target weight of 183 pounds today.       DIET  It is recommended you follow a 2000 mg low sodium diet, avoid processed food, canned food and fast food restaurants.

## 2017-07-03 NOTE — TELEPHONE ENCOUNTER
Writer called Eliseo Ndiaye regarding F/U appt. The patient is scheduled with Zelda JUNIOR RN to test PD cath and wound check on 7/7/2017. Zelda JIMENEZ recommended patient does not need to F/U with writer on 7/5/2017. Appt could be canceled   Writer called the patient to cancel  F/U appt with writer on 7/5/2017 Wednesday, please keep his other 2 appts.    Patient verbalized acceptance and understanding of plan.  7/5 F/U appt has been canceled.

## 2017-07-04 ENCOUNTER — PRE VISIT (OUTPATIENT)
Dept: CARDIOLOGY | Facility: CLINIC | Age: 62
End: 2017-07-04

## 2017-07-04 DIAGNOSIS — I50.22 CHRONIC SYSTOLIC HEART FAILURE (H): Primary | ICD-10-CM

## 2017-07-05 ENCOUNTER — ALLIED HEALTH/NURSE VISIT (OUTPATIENT)
Dept: TRANSPLANT | Facility: CLINIC | Age: 62
End: 2017-07-05
Attending: INTERNAL MEDICINE
Payer: COMMERCIAL

## 2017-07-05 ENCOUNTER — OFFICE VISIT (OUTPATIENT)
Dept: CARDIOLOGY | Facility: CLINIC | Age: 62
End: 2017-07-05
Attending: NURSE PRACTITIONER
Payer: COMMERCIAL

## 2017-07-05 ENCOUNTER — CARE COORDINATION (OUTPATIENT)
Dept: CARE COORDINATION | Facility: CLINIC | Age: 62
End: 2017-07-05

## 2017-07-05 ENCOUNTER — TELEPHONE (OUTPATIENT)
Dept: FAMILY MEDICINE | Facility: CLINIC | Age: 62
End: 2017-07-05

## 2017-07-05 ENCOUNTER — TELEPHONE (OUTPATIENT)
Dept: PHARMACY | Facility: CLINIC | Age: 62
End: 2017-07-05

## 2017-07-05 VITALS
OXYGEN SATURATION: 99 % | HEIGHT: 69 IN | WEIGHT: 186.3 LBS | SYSTOLIC BLOOD PRESSURE: 131 MMHG | HEART RATE: 81 BPM | DIASTOLIC BLOOD PRESSURE: 74 MMHG | BODY MASS INDEX: 27.59 KG/M2

## 2017-07-05 VITALS — SYSTOLIC BLOOD PRESSURE: 133 MMHG | DIASTOLIC BLOOD PRESSURE: 76 MMHG

## 2017-07-05 DIAGNOSIS — N18.5 ANEMIA IN STAGE 5 CHRONIC KIDNEY DISEASE (H): ICD-10-CM

## 2017-07-05 DIAGNOSIS — D63.1 ANEMIA IN STAGE 5 CHRONIC KIDNEY DISEASE (H): Primary | ICD-10-CM

## 2017-07-05 DIAGNOSIS — M10.9 ACUTE GOUTY ARTHRITIS: ICD-10-CM

## 2017-07-05 DIAGNOSIS — M16.0 OSTEOARTHRITIS OF BOTH HIPS, UNSPECIFIED OSTEOARTHRITIS TYPE: ICD-10-CM

## 2017-07-05 DIAGNOSIS — I50.22 CHRONIC SYSTOLIC HEART FAILURE (H): Primary | ICD-10-CM

## 2017-07-05 DIAGNOSIS — N18.5 ANEMIA IN STAGE 5 CHRONIC KIDNEY DISEASE (H): Primary | ICD-10-CM

## 2017-07-05 DIAGNOSIS — N18.5 CKD (CHRONIC KIDNEY DISEASE) STAGE 5, GFR LESS THAN 15 ML/MIN (H): ICD-10-CM

## 2017-07-05 DIAGNOSIS — D63.1 ANEMIA IN STAGE 5 CHRONIC KIDNEY DISEASE (H): ICD-10-CM

## 2017-07-05 DIAGNOSIS — M10.9 GOUT, UNSPECIFIED CAUSE, UNSPECIFIED CHRONICITY, UNSPECIFIED SITE: ICD-10-CM

## 2017-07-05 DIAGNOSIS — I50.22 CHRONIC SYSTOLIC HEART FAILURE (H): ICD-10-CM

## 2017-07-05 DIAGNOSIS — M25.552 HIP PAIN, LEFT: ICD-10-CM

## 2017-07-05 LAB
ALBUMIN SERPL-MCNC: 2.7 G/DL (ref 3.4–5)
ALP SERPL-CCNC: 98 U/L (ref 40–150)
ALT SERPL W P-5'-P-CCNC: 15 U/L (ref 0–70)
ANION GAP SERPL CALCULATED.3IONS-SCNC: 12 MMOL/L (ref 3–14)
AST SERPL W P-5'-P-CCNC: 13 U/L (ref 0–45)
BASOPHILS # BLD AUTO: 0 10E9/L (ref 0–0.2)
BASOPHILS NFR BLD AUTO: 0.1 %
BILIRUB SERPL-MCNC: 0.5 MG/DL (ref 0.2–1.3)
BUN SERPL-MCNC: 130 MG/DL (ref 7–30)
CALCIUM SERPL-MCNC: 8.5 MG/DL (ref 8.5–10.1)
CHLORIDE SERPL-SCNC: 98 MMOL/L (ref 94–109)
CO2 SERPL-SCNC: 27 MMOL/L (ref 20–32)
CREAT SERPL-MCNC: 6.12 MG/DL (ref 0.66–1.25)
CRP SERPL-MCNC: 35.4 MG/L (ref 0–8)
DIFFERENTIAL METHOD BLD: ABNORMAL
EOSINOPHIL # BLD AUTO: 0 10E9/L (ref 0–0.7)
EOSINOPHIL NFR BLD AUTO: 0.1 %
ERYTHROCYTE [DISTWIDTH] IN BLOOD BY AUTOMATED COUNT: 16.2 % (ref 10–15)
FERRITIN SERPL-MCNC: 542 NG/ML (ref 26–388)
GFR SERPL CREATININE-BSD FRML MDRD: 9 ML/MIN/1.7M2
GLUCOSE SERPL-MCNC: 160 MG/DL (ref 70–99)
HCT VFR BLD AUTO: 27.4 % (ref 40–53)
HGB BLD-MCNC: 8.9 G/DL (ref 13.3–17.7)
IMM GRANULOCYTES # BLD: 0.1 10E9/L (ref 0–0.4)
IMM GRANULOCYTES NFR BLD: 0.6 %
IRON SATN MFR SERPL: 12 % (ref 15–46)
IRON SERPL-MCNC: 26 UG/DL (ref 35–180)
LYMPHOCYTES # BLD AUTO: 0.3 10E9/L (ref 0.8–5.3)
LYMPHOCYTES NFR BLD AUTO: 2.7 %
MCH RBC QN AUTO: 29.5 PG (ref 26.5–33)
MCHC RBC AUTO-ENTMCNC: 32.5 G/DL (ref 31.5–36.5)
MCV RBC AUTO: 91 FL (ref 78–100)
MONOCYTES # BLD AUTO: 0.6 10E9/L (ref 0–1.3)
MONOCYTES NFR BLD AUTO: 5 %
NEUTROPHILS # BLD AUTO: 10.9 10E9/L (ref 1.6–8.3)
NEUTROPHILS NFR BLD AUTO: 91.5 %
NRBC # BLD AUTO: 0 10*3/UL
NRBC BLD AUTO-RTO: 0 /100
PLATELET # BLD AUTO: 267 10E9/L (ref 150–450)
POTASSIUM SERPL-SCNC: 3.8 MMOL/L (ref 3.4–5.3)
PROT SERPL-MCNC: 6.2 G/DL (ref 6.8–8.8)
RBC # BLD AUTO: 3.02 10E12/L (ref 4.4–5.9)
SODIUM SERPL-SCNC: 138 MMOL/L (ref 133–144)
TIBC SERPL-MCNC: 228 UG/DL (ref 240–430)
URATE SERPL-MCNC: 11.4 MG/DL (ref 3.5–7.2)
WBC # BLD AUTO: 11.9 10E9/L (ref 4–11)

## 2017-07-05 PROCEDURE — 25000128 H RX IP 250 OP 636: Mod: ZF | Performed by: INTERNAL MEDICINE

## 2017-07-05 PROCEDURE — 40000269 ZZH STATISTIC NO CHARGE FACILITY FEE: Mod: ZF

## 2017-07-05 PROCEDURE — 99213 OFFICE O/P EST LOW 20 MIN: CPT | Mod: ZP | Performed by: NURSE PRACTITIONER

## 2017-07-05 PROCEDURE — 99212 OFFICE O/P EST SF 10 MIN: CPT | Mod: ZF

## 2017-07-05 PROCEDURE — 96372 THER/PROPH/DIAG INJ SC/IM: CPT

## 2017-07-05 RX ADMIN — DARBEPOETIN ALFA 100 MCG: 100 INJECTION, SOLUTION INTRAVENOUS; SUBCUTANEOUS at 15:02

## 2017-07-05 ASSESSMENT — PAIN SCALES - GENERAL: PAINLEVEL: NO PAIN (0)

## 2017-07-05 NOTE — PATIENT INSTRUCTIONS
You were seen today in the Cardiovascular Clinic at the [facility].     Cardiology Providers you saw during your visit: Janett Martini       1. No medicine changes today    2. Please call us if you  weight, swelling, or feel more short of breath at all. Do not hesitate to call us    3. Please return to clinic August 2 at 1 PM to see Kristi Ayon. We'll connect with Dr. Posada's team in the meantime to discuss angiogram once you are on dialysis          Results for SANCHEZ MCNEIL (MRN [id number]) as of 7/5/2017 12:41   Ref. Range 7/5/2017 12:04   Sodium Latest Ref Range: 133 - 144 mmol/L 138   Potassium Latest Ref Range: 3.4 - 5.3 mmol/L 3.8   Chloride Latest Ref Range: 94 - 109 mmol/L 98   Carbon Dioxide Latest Ref Range: 20 - 32 mmol/L 27   Urea Nitrogen Latest Ref Range: 7 - 30 mg/dL 130 (H)   Creatinine Latest Ref Range: 0.66 - 1.25 mg/dL 6.12 (H)   GFR Estimate Latest Ref Range: >60 mL/min/1.7m2 9 (L)   GFR Estimate If Black Latest Ref Range: >60 mL/min/1.7m2 11 (L)   Calcium Latest Ref Range: 8.5 - 10.1 mg/dL 8.5   Anion Gap Latest Ref Range: 3 - 14 mmol/L 12   Albumin Latest Ref Range: 3.4 - 5.0 g/dL 2.7 (L)   Protein Total Latest Ref Range: 6.8 - 8.8 g/dL 6.2 (L)   Bilirubin Total Latest Ref Range: 0.2 - 1.3 mg/dL 0.5   Alkaline Phosphatase Latest Ref Range: 40 - 150 U/L 98   ALT Latest Ref Range: 0 - 70 U/L 15   AST Latest Ref Range: 0 - 45 U/L 13   CRP Inflammation Latest Ref Range: 0.0 - 8.0 mg/L 35.4 (H)   Uric Acid Latest Ref Range: 3.5 - 7.2 mg/dL 11.4 (H)   Glucose Latest Ref Range: 70 - 99 mg/dL 160 (H)   WBC Latest Ref Range: 4.0 - 11.0 10e9/L 11.9 (H)   Hemoglobin Latest Ref Range: 13.3 - 17.7 g/dL 8.9 (L)   Hematocrit Latest Ref Range: 40.0 - 53.0 % 27.4 (L)   Platelet Count Latest Ref Range: 150 - 450 10e9/L 267   RBC Count Latest Ref Range: 4.4 - 5.9 10e12/L 3.02 (L)   MCV Latest Ref Range: 78 - 100 fl 91   MCH Latest Ref Range: 26.5 - 33.0 pg 29.5   MCHC Latest Ref Range:  "31.5 - 36.5 g/dL 32.5   RDW Latest Ref Range: 10.0 - 15.0 % 16.2 (H)   Diff Method Unknown Automated Method   % Neutrophils Latest Units: % 91.5   % Lymphocytes Latest Units: % 2.7   % Monocytes Latest Units: % 5.0   % Eosinophils Latest Units: % 0.1   % Basophils Latest Units: % 0.1   % Immature Granulocytes Latest Units: % 0.6   Nucleated RBCs Latest Ref Range: 0 /100 0   Absolute Neutrophil Latest Ref Range: 1.6 - 8.3 10e9/L 10.9 (H)   Absolute Lymphocytes Latest Ref Range: 0.8 - 5.3 10e9/L 0.3 (L)   Absolute Monocytes Latest Ref Range: 0.0 - 1.3 10e9/L 0.6   Absolute Eosinophils Latest Ref Range: 0.0 - 0.7 10e9/L 0.0   Absolute Basophils Latest Ref Range: 0.0 - 0.2 10e9/L 0.0   Abs Immature Granulocytes Latest Ref Range: 0 - 0.4 10e9/L 0.1   Absolute Nucleated RBC Unknown 0.0         Please limit your fluid intake to 2 L (64 ounces) daily.  2 Liters a day = 8.5 cups, or 72 ounces.  Please limit your salt intake to 2 grams a day or less.    If you gain 2# in 24 hours or 5# in one week call Ragini Jacobs RN so we can adjust your medications as needed over the phone.    Please feel free to call me with any questions or concerns.      Ragini Jacobs RN BSN   [facility]  Cardiology Care Coordinator-Heart Failure Clinic    Questions and [phone].   First press #1 for the Chairish and then press #3 for \"Medical Questions\" to reach us Cardiology Nurses.     On Call Cardiologist for after hours or on weekends: [phone]   option #4 and ask to speak to the on-call Cardiologist. Inform them you are a CORE/heart failure patient at the [facility].        If you need a medication refill please contact your pharmacy.  Please allow 3 business days for your refill to be completed.  _______________________________________________________  [facility] Cardiomyopathy, Optimization, Rehabilitation, Education   The [facility] is a heart failure specialty clinic within the [facility]" [facility] where you will work with specialized nurse practitioners dedicated to helping patients with heart failure carefully adjust medications, receive education, and learn who and when to call if symptoms develop. They specialize in helping you better understand your condition, slow the progression of your disease, improve the length and quality of your life, help you detect future heart problems before they become life threatening, and avoid hospitalizations.  As always, thank you for trusting us with your health care needs!

## 2017-07-05 NOTE — PROGRESS NOTES
I will forward to Dr. Tang who is his rheumatologist. Please change the lab orders for under your name. Des MARTIN

## 2017-07-05 NOTE — TELEPHONE ENCOUNTER
"ED/Discharge Protocol    \"Hi, my name is Myriam Clinton, a registered nurse, and I am calling on behalf of Dr. Turcios's office at [facility].  I am calling to follow up and see how things are going for you after your recent visit.\"    \"I see that you were in the (ER/UC/IP) on 6/26/2017.    How are you doing now that you are home?\"  \"Feels good\"  Weight down to 182 yeaterday    Is patient experiencing symptoms that may require a hospital visit?  none    Discharge Instructions    \"Let's review your discharge instructions.  What is/are the follow-up recommendations?  Pt. Response: yes, follow up with core clinic and primary provider    \"Were you instructed to make a follow-up appointment?\"  Pt. Response: Yes.  Has appointment been made?   No.  \"Can I help you schedule that appointment?\" appt made for tomorrow AM.      \"When you see the provider, I would recommend that you bring your discharge instructions with you.    Medications    \"How many new medications are you on since your hospitalization/ED visit?\"    0-1  \"How many of your current medicines changed (dose, timing, name, etc.) while you were in the hospital/ED visit?\"   0-1  \"Do you have questions about your medications?\"   No  \"Were you newly diagnosed with heart failure, COPD, diabetes or did you have a heart attack?\"   No  For patients on insulin: \"Did you start on insulin in the hospital or did you have your insulin dose changed?\"   No    Medication reconciliation completed? Yes    Was MTM referral placed (*Make sure to put transitions as reason for referral)?   No    Call Summary    \"Do you have any questions or concerns about your condition or care plan at the moment?\"    No  Triage nurse advice given: keep checking daily weight and in weight goes up call clinic and to take meds as directed    Patient was in ER 3 times in the past year (assess appropriateness of ER visits.)      \"If you have questions or things don't continue to improve, we encourage you contact " "us through the main clinic number,  [phone].  Even if the clinic is not open, triage nurses are available 24/7 to help you.     We would like you to know that our clinic has extended hours (provide information).  We also have urgent care (provide details on closest location and hours/contact info)\"      \"Thank you for your time and take care!\"          "

## 2017-07-05 NOTE — PROGRESS NOTES
Patient has clinic visit today 7/5 so no post DC follow up call is needed          Date: 7/5/2017 Status: [place]   Time: 12:30 PM Length: 60     Visit Type: UMP CORE RETURN [[id number]]   LEXII:     Provider: Janett Martini NP Department:  CARDIOVASCULAR CTR   Special Needs:   Comments:     Referral Number:   Referral Status:         CSN: [id number]   Notes: Follow up post hospitalization     Made On:   7/3/2017 1:32 PM By:   DAMIEN JEAN [[email or web address]] (ES)

## 2017-07-05 NOTE — NURSING NOTE
Chief Complaint   Patient presents with     Follow Up For     Return CORE; 62yr old male with a h/o chornic systolic heart failure secondary to ischemic cardiology presenting for follow-up post recent hospitalization with labs prior.     Vitals were taken and medications were reconciled.     Manuel Ames MA  12:34 PM

## 2017-07-05 NOTE — LETTER
Patient:  Murray Nicholson  :   1955  MRN:     [id number]        Mr.Emil Nicholson  [address]  [place] [address]        July 10, 2017    Dear ,    We are writing to inform you of your test results. Your uric acid is not at goal (under 6.0), please increase allopurinol to 300 mg daily.      Resulted Orders   CBC with platelets differential   Result Value Ref Range    WBC 11.9 (H) 4.0 - 11.0 10e9/L    RBC Count 3.02 (L) 4.4 - 5.9 10e12/L    Hemoglobin 8.9 (L) 13.3 - 17.7 g/dL    Hematocrit 27.4 (L) 40.0 - 53.0 %    MCV 91 78 - 100 fl    MCH 29.5 26.5 - 33.0 pg    MCHC 32.5 31.5 - 36.5 g/dL    RDW 16.2 (H) 10.0 - 15.0 %    Platelet Count 267 150 - 450 10e9/L    Diff Method Automated Method     % Neutrophils 91.5 %    % Lymphocytes 2.7 %    % Monocytes 5.0 %    % Eosinophils 0.1 %    % Basophils 0.1 %    % Immature Granulocytes 0.6 %    Nucleated RBCs 0 0 /100    Absolute Neutrophil 10.9 (H) 1.6 - 8.3 10e9/L    Absolute Lymphocytes 0.3 (L) 0.8 - 5.3 10e9/L    Absolute Monocytes 0.6 0.0 - 1.3 10e9/L    Absolute Eosinophils 0.0 0.0 - 0.7 10e9/L    Absolute Basophils 0.0 0.0 - 0.2 10e9/L    Abs Immature Granulocytes 0.1 0 - 0.4 10e9/L    Absolute Nucleated RBC 0.0    Comprehensive metabolic panel   Result Value Ref Range    Sodium 138 133 - 144 mmol/L    Potassium 3.8 3.4 - 5.3 mmol/L    Chloride 98 94 - 109 mmol/L    Carbon Dioxide 27 20 - 32 mmol/L    Anion Gap 12 3 - 14 mmol/L    Glucose 160 (H) 70 - 99 mg/dL    Urea Nitrogen 130 (H) 7 - 30 mg/dL    Creatinine 6.12 (H) 0.66 - 1.25 mg/dL    GFR Estimate 9 (L) >60 mL/min/1.7m2      Comment:      Non  GFR Calc    GFR Estimate If Black 11 (L) >60 mL/min/1.7m2      Comment:       GFR Calc    Calcium 8.5 8.5 - 10.1 mg/dL    Bilirubin Total 0.5 0.2 - 1.3 mg/dL    Albumin 2.7 (L) 3.4 - 5.0 g/dL    Protein Total 6.2 (L) 6.8 - 8.8 g/dL    Alkaline Phosphatase 98 40 - 150 U/L    ALT 15 0 - 70 U/L    AST 13 0 - 45 U/L    Uric acid   Result Value Ref Range    Uric Acid 11.4 (H) 3.5 - 7.2 mg/dL   CRP inflammation   Result Value Ref Range    CRP Inflammation 35.4 (H) 0.0 - 8.0 mg/L         [id number]  1955

## 2017-07-05 NOTE — LETTER
Patient:  Murray Nicholson  :   1955  MRN:     [id number]        Mr.Emil Nicholson  [address] [place] [address]        2017    Dear ,    We are writing to inform you of your test results.      Resulted Orders   CBC with platelets differential   Result Value Ref Range    WBC 11.9 (H) 4.0 - 11.0 10e9/L    RBC Count 3.02 (L) 4.4 - 5.9 10e12/L    Hemoglobin 8.9 (L) 13.3 - 17.7 g/dL    Hematocrit 27.4 (L) 40.0 - 53.0 %    MCV 91 78 - 100 fl    MCH 29.5 26.5 - 33.0 pg    MCHC 32.5 31.5 - 36.5 g/dL    RDW 16.2 (H) 10.0 - 15.0 %    Platelet Count 267 150 - 450 10e9/L    Diff Method Automated Method     % Neutrophils 91.5 %    % Lymphocytes 2.7 %    % Monocytes 5.0 %    % Eosinophils 0.1 %    % Basophils 0.1 %    % Immature Granulocytes 0.6 %    Nucleated RBCs 0 0 /100    Absolute Neutrophil 10.9 (H) 1.6 - 8.3 10e9/L    Absolute Lymphocytes 0.3 (L) 0.8 - 5.3 10e9/L    Absolute Monocytes 0.6 0.0 - 1.3 10e9/L    Absolute Eosinophils 0.0 0.0 - 0.7 10e9/L    Absolute Basophils 0.0 0.0 - 0.2 10e9/L    Abs Immature Granulocytes 0.1 0 - 0.4 10e9/L    Absolute Nucleated RBC 0.0    Comprehensive metabolic panel   Result Value Ref Range    Sodium 138 133 - 144 mmol/L    Potassium 3.8 3.4 - 5.3 mmol/L    Chloride 98 94 - 109 mmol/L    Carbon Dioxide 27 20 - 32 mmol/L    Anion Gap 12 3 - 14 mmol/L    Glucose 160 (H) 70 - 99 mg/dL    Urea Nitrogen 130 (H) 7 - 30 mg/dL    Creatinine 6.12 (H) 0.66 - 1.25 mg/dL    GFR Estimate 9 (L) >60 mL/min/1.7m2      Comment:      Non  GFR Calc    GFR Estimate If Black 11 (L) >60 mL/min/1.7m2      Comment:       GFR Calc    Calcium 8.5 8.5 - 10.1 mg/dL    Bilirubin Total 0.5 0.2 - 1.3 mg/dL    Albumin 2.7 (L) 3.4 - 5.0 g/dL    Protein Total 6.2 (L) 6.8 - 8.8 g/dL    Alkaline Phosphatase 98 40 - 150 U/L    ALT 15 0 - 70 U/L    AST 13 0 - 45 U/L   Uric acid   Result Value Ref Range    Uric Acid 11.4 (H) 3.5 - 7.2 mg/dL   CRP  inflammation   Result Value Ref Range    CRP Inflammation 35.4 (H) 0.0 - 8.0 mg/L         [id number]  1955

## 2017-07-05 NOTE — TELEPHONE ENCOUNTER
Please call for In Patient follow up  Chief Complaint: Acute Chest Pain, Hypertension Goal Bp (Blood Pressure) < 130/80

## 2017-07-05 NOTE — MR AVS SNAPSHOT
After Visit Summary   7/5/2017    Murray Nicholson    MRN: [id number]           Patient Information     Date Of Birth          1955        Visit Information        Provider Department      7/5/2017 1:30 PM [facility]        Today's Diagnoses     Anemia in stage 5 chronic kidney disease (H)    -  1    CKD (chronic kidney disease) stage 5, GFR less than 15 ml/min (H)           Follow-ups after your visit        Your next 10 appointments already scheduled     Jul 06, 2017 11:40 AM CDT   Office Visit with Yahir Turcios MD   [facility]    [address] [place] [address] [id number] [phone]           Bring a current list of meds and any records pertaining to this visit.  For Physicals, please bring immunization records and any forms needing to be filled out.  Please arrive 10 minutes early to complete paperwork.            Jul 19, 2017 12:30 PM CDT   LAB with  LAB   [facility] ([facility])    [address] [place] [id number] [phone]           Patient must bring picture ID.  Patient should be prepared to give a urine specimen  Please do not eat 10-12 hours before your appointment if you are coming in fasting for labs on lipids, cholesterol, or glucose (sugar).  Pregnant women should follow their Care Team instructions. Water with medications is okay. Do not drink coffee or other fluids.   If you have concerns about taking  your medications, please ask at office or if scheduling via FatSkunkhart, send a message by clicking on Secure Messaging, Message Your Care Team.            Jul 19, 2017  1:00 PM CDT   Nurse Visit with  Tx Nurse   [facility] ([facility])    [address] [facility]  3rd [facility] [id number] [phone]            Aug 02, 2017 12:30 PM CDT   Lab with  LAB   [facility]    [address] [place] [id number]   [phone]            Aug 02, 2017  1:00 PM CDT   (Arrive by 12:45 PM)   CORE RETURN with Kristi Ayon NP   [facility] ([facility])    [address] [place] [phone]   [phone]            Aug 02, 2017  2:30 PM CDT   (Arrive by 2:00 PM)   Return Visit with Brice Caraballo MD   [facility] ([facility])    [address] [place] [id number] [phone]            Aug 18, 2017  8:30 AM CDT   (Arrive by 8:15 AM)   Return Visit with Darvin Tang MD   [facility] ([facility])    [address] [place] [id number] [phone]              Who to contact     If you have questions or need follow up information about today's clinic visit or your schedule please contact [facility] directly at [phone].  Normal or non-critical lab and imaging results will be communicated to you by Spreadsavehart, letter or phone within 4 business days after the clinic has received the results. If you do not hear from us within 7 days, please contact the clinic through Spreadsavehart or phone. If you have a critical or abnormal lab result, we will notify you by phone as soon as possible.  Submit refill requests through Mirriad or call your pharmacy and they will forward the refill request to us. Please allow 3 business days for your refill to be completed.          Additional Information About Your Visit        SpreadsaveharCordium Links Information     Mirriad gives you secure access to your electronic health record. If you see a primary care provider, you can also send messages to your care team and make appointments. If you have questions, please call your primary care clinic.  If you do not have a primary care provider, please call [phone] and they will  assist you.        Care EveryWhere ID     This is your Care EveryWhere ID. This could be used by other organizations to access your [facility] medical records  [id number]         Blood Pressure from Last 3 Encounters:   07/05/17 133/76   07/05/17 131/74   06/30/17 146/89    Weight from Last 3 Encounters:   07/05/17 84.5 kg (186 lb 4.8 oz)   06/30/17 84 kg (185 lb 3.2 oz)   06/22/17 84.5 kg (186 lb 4.6 oz)              Today, you had the following     No orders found for display       Primary Care Provider Office Phone # Fax #    Yahir Turcios [phone]       [address] [address]        Equal Access to Services     JOHN HAUSER : Hadii aad ku hadasho Soomaali, waaxda luqadaha, qaybta kaalmada adeegyada, jose palencia . So [facility] [phone].    ATENCIÓN: Si habla español, tiene a ortiz disposición servicios gratuitos de asistencia lingüística. Llame al [phone].    We comply with applicable federal civil rights laws and [place] laws. We do not discriminate on the basis of race, color, national origin, age, disability sex, sexual orientation or gender identity.            Thank you!     Thank you for choosing [facility]  for your care. Our goal is always to provide you with excellent care. Hearing back from our patients is one way we can continue to improve our services. Please take a few minutes to complete the written survey that you may receive in the mail after your visit with us. Thank you!             Your Updated Medication List - Protect others around you: Learn how to safely use, store and throw away your medicines at www.disposemymeds.org.          This list is accurate as of: 7/5/17  3:16 PM.  Always use your most recent med list.                   Brand Name Dispense Instructions for use Diagnosis    acetaminophen 325 MG tablet    TYLENOL    100 tablet    Take 1-2 tablets (325-650 mg) by mouth every 4  hours as needed for mild pain Do not take more than 10 tablets in 24 hours    Gout, unspecified cause, unspecified chronicity, unspecified site       albuterol 108 (90 BASE) MCG/ACT Inhaler    PROAIR HFA/PROVENTIL HFA/VENTOLIN HFA    1 Inhaler    Inhale 2 puffs into the lungs every 6 hours as needed for shortness of breath / dyspnea or wheezing    Cough       allopurinol 100 MG tablet    ZYLOPRIM    90 tablet    Take 2 tablets (200 mg) by mouth daily Start with 150 mg daily for 2 weeks then go to 200 mg daily.    Gout, unspecified cause, unspecified chronicity, unspecified site       amLODIPine 5 MG tablet    NORVASC    30 tablet    Take 1 tablet (5 mg) by mouth daily    Hypertension goal BP (blood pressure) < 130/80       aspirin 81 MG tablet      Take 1 tablet (81 mg) by mouth at bedtime        atorvastatin 40 MG tablet    LIPITOR    90 tablet    Take 1 tablet (40 mg) by mouth daily    CKD (chronic kidney disease) stage 3, GFR 30-59 ml/min, Hyperlipidemia LDL goal <100       bumetanide 2 MG tablet    BUMEX    180 tablet    Take 3 tablets (6 mg) by mouth 2 times daily    Systolic congestive heart failure, unspecified congestive heart failure chronicity (H)       calcitRIOL 0.25 MCG capsule    ROCALTROL    90 capsule    Take 1 capsule (0.25 mcg) by mouth daily    Hypocalcemia       darbepoetin emily 100 MCG/0.5ML injection    ARANESP (ALBUMIN FREE)    0.5 mL    Inject 0.5 mLs (100 mcg) Subcutaneous every 14 days As needed for hgb<10g/dL.  If Hgb increases >1 point in 2 weeks (if blood transfusion given, use hgb PRIOR to this), SYSTOLIC BP > 180 mmHg or hgb>=10g/dL, HOLD DOSE. Dose must be within 1 week of Hgb.  Per anemia protocol with Brice Caraballo MD    Anemia in stage 5 chronic kidney disease (H), CKD (chronic kidney disease) stage 5, GFR less than 15 ml/min (H)       docusate sodium 100 MG capsule    COLACE    60 capsule    Take 1 capsule (100 mg) by mouth 2 times daily    Constipation, unspecified constipation  type       glipiZIDE 5 MG tablet    GLUCOTROL    90 tablet    Take 1 tablet by mouth. TAKE 1 TABLET BY MOUTH DAILY BEFORE A MEAL    Type 2 diabetes mellitus with other specified complication (H)       hydrALAZINE 50 MG tablet    APRESOLINE    270 tablet    Take 1 tablet (50 mg) by mouth 3 times daily    Type 2 diabetes mellitus with stage 5 chronic kidney disease not on chronic dialysis, without long-term current use of insulin (H)       isosorbide mononitrate 60 MG 24 hr tablet    IMDUR    90 tablet    Take 1 tablet (60 mg) by mouth daily    Chronic systolic congestive heart failure (H)       loratadine 10 MG tablet    CLARITIN     Take 10 mg by mouth daily as needed Reported on 5/3/2017    Hypertensive cardiopathy, SOB (shortness of breath)       losartan 100 MG tablet    COZAAR    90 tablet    Take 1 tablet (100 mg) by mouth daily    Renal hypertension, stage 1-4 or unspecified chronic kidney disease       metolazone 2.5 MG tablet    ZAROXOLYN    90 tablet    Take 1 tablet (2.5 mg) by mouth every other day    Other hypervolemia       metoprolol 100 MG 24 hr tablet    TOPROL XL    90 tablet    Take 1 tablet (100 mg) by mouth daily    Chronic systolic congestive heart failure (H)       omeprazole 20 MG CR capsule    priLOSEC     Take 20 mg by mouth daily At night        potassium chloride SA 20 MEQ CR tablet    K-DUR/KLOR-CON M    90 tablet    Take 40mEq in the morning (2 tabs) and 20mEq (1 tab) in the evening for a total of 60mEq daily.    Combined systolic and diastolic congestive heart failure, unspecified congestive heart failure chronicity (H), Hypervolemia, unspecified hypervolemia type, Hospital discharge follow-up       predniSONE 10 MG tablet    DELTASONE    30 tablet    Take 1 tablet (10 mg) by mouth daily    Gout, unspecified cause, unspecified chronicity, unspecified site       sodium bicarbonate 650 MG tablet     180 tablet    TAKE 2 TABLETS(1300 MG) BY MOUTH THREE TIMES DAILY    Chronic kidney  disease, unspecified
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
## 2017-07-05 NOTE — MR AVS SNAPSHOT
After Visit Summary   7/5/2017    Sanchez Mcneil    MRN: [id number]           Patient Information     Date Of Birth          1955        Visit Information        Provider Department      7/5/2017 12:30 PM Janett Martini NP [facility]        Care Instructions    You were seen today in the Cardiovascular Clinic at the [facility].     Cardiology Providers you saw during your visit: Janett Martini       1. No medicine changes today    2. Please call us if you  weight, swelling, or feel more short of breath at all. Do not hesitate to call us    3. Please return to clinic August 2 at 1 PM to see Kristi Ayon. We'll connect with Dr. Posada's team in the meantime to discuss angiogram once you are on dialysis          Results for SANCHEZ MCNEIL (MRN [id number]) as of 7/5/2017 12:41   Ref. Range 7/5/2017 12:04   Sodium Latest Ref Range: 133 - 144 mmol/L 138   Potassium Latest Ref Range: 3.4 - 5.3 mmol/L 3.8   Chloride Latest Ref Range: 94 - 109 mmol/L 98   Carbon Dioxide Latest Ref Range: 20 - 32 mmol/L 27   Urea Nitrogen Latest Ref Range: 7 - 30 mg/dL 130 (H)   Creatinine Latest Ref Range: 0.66 - 1.25 mg/dL 6.12 (H)   GFR Estimate Latest Ref Range: >60 mL/min/1.7m2 9 (L)   GFR Estimate If Black Latest Ref Range: >60 mL/min/1.7m2 11 (L)   Calcium Latest Ref Range: 8.5 - 10.1 mg/dL 8.5   Anion Gap Latest Ref Range: 3 - 14 mmol/L 12   Albumin Latest Ref Range: 3.4 - 5.0 g/dL 2.7 (L)   Protein Total Latest Ref Range: 6.8 - 8.8 g/dL 6.2 (L)   Bilirubin Total Latest Ref Range: 0.2 - 1.3 mg/dL 0.5   Alkaline Phosphatase Latest Ref Range: 40 - 150 U/L 98   ALT Latest Ref Range: 0 - 70 U/L 15   AST Latest Ref Range: 0 - 45 U/L 13   CRP Inflammation Latest Ref Range: 0.0 - 8.0 mg/L 35.4 (H)   Uric Acid Latest Ref Range: 3.5 - 7.2 mg/dL 11.4 (H)   Glucose Latest Ref Range: 70 - 99 mg/dL 160 (H)   WBC Latest Ref Range: 4.0 - 11.0 10e9/L 11.9 (H)   Hemoglobin Latest Ref Range: 13.3 -  "17.7 g/dL 8.9 (L)   Hematocrit Latest Ref Range: 40.0 - 53.0 % 27.4 (L)   Platelet Count Latest Ref Range: 150 - 450 10e9/L 267   RBC Count Latest Ref Range: 4.4 - 5.9 10e12/L 3.02 (L)   MCV Latest Ref Range: 78 - 100 fl 91   MCH Latest Ref Range: 26.5 - 33.0 pg 29.5   MCHC Latest Ref Range: 31.5 - 36.5 g/dL 32.5   RDW Latest Ref Range: 10.0 - 15.0 % 16.2 (H)   Diff Method Unknown Automated Method   % Neutrophils Latest Units: % 91.5   % Lymphocytes Latest Units: % 2.7   % Monocytes Latest Units: % 5.0   % Eosinophils Latest Units: % 0.1   % Basophils Latest Units: % 0.1   % Immature Granulocytes Latest Units: % 0.6   Nucleated RBCs Latest Ref Range: 0 /100 0   Absolute Neutrophil Latest Ref Range: 1.6 - 8.3 10e9/L 10.9 (H)   Absolute Lymphocytes Latest Ref Range: 0.8 - 5.3 10e9/L 0.3 (L)   Absolute Monocytes Latest Ref Range: 0.0 - 1.3 10e9/L 0.6   Absolute Eosinophils Latest Ref Range: 0.0 - 0.7 10e9/L 0.0   Absolute Basophils Latest Ref Range: 0.0 - 0.2 10e9/L 0.0   Abs Immature Granulocytes Latest Ref Range: 0 - 0.4 10e9/L 0.1   Absolute Nucleated RBC Unknown 0.0         Please limit your fluid intake to 2 L (64 ounces) daily.  2 Liters a day = 8.5 cups, or 72 ounces.  Please limit your salt intake to 2 grams a day or less.    If you gain 2# in 24 hours or 5# in one week call Ragini Jacobs RN so we can adjust your medications as needed over the phone.    Please feel free to call me with any questions or concerns.      Ragini Jacobs RN BSN   [facility]  Cardiology Care Coordinator-Heart Failure Clinic    Questions and [phone].   First press #1 for the University and then press #3 for \"Medical Questions\" to reach us Cardiology Nurses.     On Call Cardiologist for after hours or on weekends: [phone]   option #4 and ask to speak to the on-call Cardiologist. Inform them you are a CORE/heart failure patient at the [place].        If you need a medication refill please " contact your pharmacy.  Please allow 3 business days for your refill to be completed.  _______________________________________________________  C.[facility].R.E[facility] CLINIC Cardiomyopathy, Optimization, Rehabilitation, Education   The C.O.R.E. CLINIC is a heart failure specialty clinic within the [facility] where you will work with specialized nurse practitioners dedicated to helping patients with heart failure carefully adjust medications, receive education, and learn who and when to call if symptoms develop. They specialize in helping you better understand your condition, slow the progression of your disease, improve the length and quality of your life, help you detect future heart problems before they become life threatening, and avoid hospitalizations.  As always, thank you for trusting us with your health care needs!                Follow-ups after your visit        Your next 10 appointments already scheduled     Jul 05, 2017  1:30 PM CDT   Nurse Visit with [facility] Nurse   [facility] Solid Organ Transplant ([facility])    [address] [place] [id number] [phone]            Jul 06, 2017 11:40 AM CDT   [facility]    [address] [place] [id number] [phone]           Bring a current list of meds and any records pertaining to this visit.  For Physicals, please bring immunization records and any forms needing to be filled out.  Please arrive 10 minutes early to complete paperwork.            Aug 02, 2017  1:00 PM CDT   Lab with  LAB    [facility] ([facility])    [address] [id number] [phone]            Aug 02, 2017  2:30 PM CDT   (Arrive by 2:00 PM)   Return Visit with Brice Caraballo MD   [facility] Nephrology ([facility])    [address]  "[facility]  [address] [facility] [id number] [phone]              Who to contact     If you have questions or need follow up information about today's clinic visit or your schedule please contact [facility] directly at [phone].  Normal or non-critical lab and imaging results will be communicated to you by MyChart, letter or phone within 4 business days after the clinic has received the results. If you do not hear from us within 7 days, please contact the clinic through [facility] or phone. If you have a critical or abnormal lab result, we will notify you by phone as soon as possible.  Submit refill requests through [facility] [employer] or call your pharmacy and they will forward the refill request to us. Please allow 3 business days for your refill to be completed.          Additional Information About Your Visit        [facility] Information     [facility] [employer] gives you secure access to your electronic health record. If you see a primary care provider, you can also send messages to your care team and make appointments. If you have questions, please call your primary care clinic.  If you do not have a primary care provider, please call [phone] and they will assist you.        Care EveryWhere ID     This is your Care EveryWhere ID. This could be used by other organizations to access your [place] medical records  [id number]        Your Vitals Were     Pulse Height Pulse Oximetry BMI (Body Mass Index)          81 1.753 m (5' 9\") 99% 27.51 kg/m2         Blood Pressure from Last 3 Encounters:   07/05/17 131/74   06/30/17 146/89   06/22/17 139/82    Weight from Last 3 Encounters:   07/05/17 84.5 kg (186 lb 4.8 oz)   06/30/17 84 kg (185 lb 3.2 oz)   06/22/17 84.5 kg (186 lb 4.6 oz)              Today, you had the following     No orders found for display       Primary Care Provider Office Phone # Fax #    Yahir Turcios [phone]       [place] [address] [address] [facility] [id number]        Equal Access to Services     [facility]: Hadii marsha wang mary Thomas, wahunterda luqadaha, qaybta kajose basiliokitmerry, waxlisette jay gaonavillafaby palencia . So [facility] [phone].    ATENCIÓN: Si habla español, tiene a ortiz disposición servicios gratuitos de asistencia lingüística. Keely al [phone].    We comply with applicable federal civil rights laws and [place] laws. We do not discriminate on the basis of race, color, national origin, age, disability sex, sexual orientation or gender identity.            Thank you!     Thank you for choosing [facility]  for your care. Our goal is always to provide you with excellent care. Hearing back from our patients is one way we can continue to improve our services. Please take a few minutes to complete the written survey that you may receive in the mail after your visit with us. Thank you!             Your Updated Medication List - Protect others around you: Learn how to safely use, store and throw away your medicines at www.disposemymeds.org.          This list is accurate as of: 7/5/17 12:58 PM.  Always use your most recent med list.                   Brand Name Dispense Instructions for use Diagnosis    acetaminophen 325 MG tablet    TYLENOL    100 tablet    Take 1-2 tablets (325-650 mg) by mouth every 4 hours as needed for mild pain Do not take more than 10 tablets in 24 hours    Gout, unspecified cause, unspecified chronicity, unspecified site       albuterol 108 (90 BASE) MCG/ACT Inhaler    PROAIR HFA/PROVENTIL HFA/VENTOLIN HFA    1 Inhaler    Inhale 2 puffs into the lungs every 6 hours as needed for shortness of breath / dyspnea or wheezing    Cough       allopurinol 100 MG tablet    ZYLOPRIM    90 tablet    Take 2 tablets (200 mg) by mouth daily Start with 150 mg daily for 2 weeks then go to 200 mg daily.    Gout, unspecified cause, unspecified chronicity, unspecified site       amLODIPine 5 MG tablet    NORVASC    30 tablet    Take 1  tablet (5 mg) by mouth daily    Hypertension goal BP (blood pressure) < 130/80       aspirin 81 MG tablet      Take 1 tablet (81 mg) by mouth at bedtime        atorvastatin 40 MG tablet    LIPITOR    90 tablet    Take 1 tablet (40 mg) by mouth daily    CKD (chronic kidney disease) stage 3, GFR 30-59 ml/min, Hyperlipidemia LDL goal <100       bumetanide 2 MG tablet    BUMEX    180 tablet    Take 3 tablets (6 mg) by mouth 2 times daily    Systolic congestive heart failure, unspecified congestive heart failure chronicity (H)       calcitRIOL 0.25 MCG capsule    ROCALTROL    90 capsule    Take 1 capsule (0.25 mcg) by mouth daily    Hypocalcemia       darbepoetin emily 100 MCG/0.5ML injection    ARANESP (ALBUMIN FREE)    0.5 mL    Inject 0.5 mLs (100 mcg) Subcutaneous every 14 days As needed for hgb<10g/dL.  If Hgb increases >1 point in 2 weeks (if blood transfusion given, use hgb PRIOR to this), SYSTOLIC BP > 180 mmHg or hgb>=10g/dL, HOLD DOSE. Dose must be within 1 week of Hgb.  Per anemia protocol with Brice Caraballo MD    Anemia in stage 5 chronic kidney disease (H), CKD (chronic kidney disease) stage 5, GFR less than 15 ml/min (H)       docusate sodium 100 MG capsule    COLACE    60 capsule    Take 1 capsule (100 mg) by mouth 2 times daily    Constipation, unspecified constipation type       glipiZIDE 5 MG tablet    GLUCOTROL    90 tablet    Take 1 tablet by mouth. TAKE 1 TABLET BY MOUTH DAILY BEFORE A MEAL    Type 2 diabetes mellitus with other specified complication (H)       hydrALAZINE 50 MG tablet    APRESOLINE    270 tablet    Take 1 tablet (50 mg) by mouth 3 times daily    Type 2 diabetes mellitus with stage 5 chronic kidney disease not on chronic dialysis, without long-term current use of insulin (H)       isosorbide mononitrate 60 MG 24 hr tablet    IMDUR    90 tablet    Take 1 tablet (60 mg) by mouth daily    Chronic systolic congestive heart failure (H)       loratadine 10 MG tablet    CLARITIN     Take 10 mg  by mouth daily as needed Reported on 5/3/2017    Hypertensive cardiopathy, SOB (shortness of breath)       losartan 100 MG tablet    COZAAR    90 tablet    Take 1 tablet (100 mg) by mouth daily    Renal hypertension, stage 1-4 or unspecified chronic kidney disease       metolazone 2.5 MG tablet    ZAROXOLYN    90 tablet    Take 1 tablet (2.5 mg) by mouth every other day    Other hypervolemia       metoprolol 100 MG 24 hr tablet    TOPROL XL    90 tablet    Take 1 tablet (100 mg) by mouth daily    Chronic systolic congestive heart failure (H)       omeprazole 20 MG CR capsule    priLOSEC     Take 20 mg by mouth daily At night        potassium chloride SA 20 MEQ CR tablet    K-DUR/KLOR-CON M    90 tablet    Take 40mEq in the morning (2 tabs) and 20mEq (1 tab) in the evening for a total of 60mEq daily.    Combined systolic and diastolic congestive heart failure, unspecified congestive heart failure chronicity (H), Hypervolemia, unspecified hypervolemia type, Hospital discharge follow-up       predniSONE 10 MG tablet    DELTASONE    30 tablet    Take 1 tablet (10 mg) by mouth daily    Gout, unspecified cause, unspecified chronicity, unspecified site       sodium bicarbonate 650 MG tablet     180 tablet    TAKE 2 TABLETS(1300 MG) BY MOUTH THREE TIMES DAILY    Chronic kidney disease, unspecified

## 2017-07-05 NOTE — TELEPHONE ENCOUNTER
Anemia Management Note  SUBJECTIVE/OBJECTIVE:  Referred by Dr. Brice Caraballo on 2015  Primary Diagnosis: Anemia in Chronic Kidney Disease (N18.5 D63.1)   Secondary Diagnosis: Chronic Kidney Disease, Stage V (N18.5)  Hgb goal range: 9-10  Epo/Darbo: Aranesp 100 mcg every 2 weeks as needed - in clinic  RX order expires on 18  Iron regimen: Ferrous Sulfate once daily - 17  Lab orders  on 2017 In Epic    Anemia Latest Ref Rng & Units 2017 5/3/2017 2017 2017 2017 2017 2017   ANASTASIYA Dose - - 100 mcg 100 mcg 100 mcg 100 mcg - 100 mcg   Hemoglobin 13.3 - 17.7 g/dL 9.2(L) 9.7(L) 9.3(L) 9.9(L) 9.8(L) 9.4(L) 8.9(L)   IV Iron Dose - - - - - - - -   TSAT 15 - 46 % - - 20 - 29 - 12(L)   Ferritin 26 - 388 ng/mL - - 806(H) - 557(H) - 542(H)     BP Readings from Last 3 Encounters:   17 133/76   17 131/74   17 146/89     Wt Readings from Last 2 Encounters:   17 186 lb 4.8 oz (84.5 kg)   17 185 lb 3.2 oz (84 kg)     Next lab/aranesp appt is scheduled for  at 12:30    ASSESSMENT:  Hgb: Not at goal - received dose in clinic - recommend continue current regimen  TSat: not at goal (>30%) but ferritin >500ng/mL.  IV iron not indicated at this time per anemia protocol. Recommend re-start ferrous sulfate 325mg once daily. -[facility]17    PLAN:  RTC for hgb, ferritin and iron labs then aranesp if needed in 2 week(s)  Murray will re-start ferrous sulfate 325mg once daily. He is not feeling more tired. -[facility]17    Orders needed to be renewed (for next follow-up date) in [facility]: None    Iron labs due:  17    Plan discussed with:  No call made. Murray/[email or web address]  Plan provided by:  Lakisha    NEXT FOLLOW-UP DATE:  17    Anemia Management Service  Zelda Rich PharmD and Rosa Marcial CPhT  Phone: [phone]  Fax: [phone]
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
## 2017-07-05 NOTE — TELEPHONE ENCOUNTER
MTM referral from: Transitions of Care (recent hospital discharge or ED visit)    MTM referral outreach attempt #2 on July 5, 2017 at 3:55 PM      Outcome: Patient is not interested at this time because they feel that it is not necessary at this time, will route to MTM Pharmacist/Provider as an FYI. Thank you for the referral.     Juliana Abel MTM Coordinator

## 2017-07-05 NOTE — NURSING NOTE
Diet: Patient instructed regarding a heart failure healthy diet, including discussion of reduced fat and 2000 mg daily sodium restriction, daily weights, medication purpose and compliance, fluid restrictions and resources for patient and family to access for assistance with heart failure management.       Labs: Patient was given results of the laboratory testing obtained today and patient was instructed about when to return for the next laboratory testing.     Med Reconcile: Reviewed and verified all current medications with the patient. The updated medication list was printed and given to the patient. No changes to medications    Return Appointment: Patient given instructions regarding scheduling next clinic visit. F/u Aug 2 at 1 with Kristi. Pt has a nephrology appt that day as well.     Patient stated he understood all health information given and agreed to call with further questions or concerns. C.O.R.E clinic education completed and booklet provided.

## 2017-07-05 NOTE — NURSING NOTE
Frequency: Every 2 weeks   Most recent or today's HGB: 8.9   Date: 2017  Date of last dose: 2017  HGB associated with last dose given: 9.8    Blood Pressure:133/76    Diagnosis: Anemia in CKD, CKD stage 5     Ordered by: Brice Caraballo MD  VIS Offered: declined     Double Checked by: Joyce Vega MA    See MAR for administration details    Pt's first name, last name and  verified prior to medication administration, injection given without complications or questions.   Trang Moore CMA

## 2017-07-06 ENCOUNTER — OFFICE VISIT (OUTPATIENT)
Dept: FAMILY MEDICINE | Facility: CLINIC | Age: 62
End: 2017-07-06
Payer: COMMERCIAL

## 2017-07-06 VITALS
TEMPERATURE: 98.1 F | DIASTOLIC BLOOD PRESSURE: 73 MMHG | RESPIRATION RATE: 18 BRPM | SYSTOLIC BLOOD PRESSURE: 136 MMHG | WEIGHT: 186.5 LBS | OXYGEN SATURATION: 100 % | BODY MASS INDEX: 27.54 KG/M2 | HEART RATE: 94 BPM

## 2017-07-06 DIAGNOSIS — N18.5 CKD (CHRONIC KIDNEY DISEASE) STAGE 5, GFR LESS THAN 15 ML/MIN (H): ICD-10-CM

## 2017-07-06 DIAGNOSIS — D63.1 ANEMIA IN STAGE 5 CHRONIC KIDNEY DISEASE (H): ICD-10-CM

## 2017-07-06 DIAGNOSIS — I50.20 SYSTOLIC CONGESTIVE HEART FAILURE, UNSPECIFIED CONGESTIVE HEART FAILURE CHRONICITY: Primary | ICD-10-CM

## 2017-07-06 DIAGNOSIS — N18.5 ANEMIA IN STAGE 5 CHRONIC KIDNEY DISEASE (H): ICD-10-CM

## 2017-07-06 PROCEDURE — 99495 TRANSJ CARE MGMT MOD F2F 14D: CPT | Performed by: FAMILY MEDICINE

## 2017-07-06 NOTE — PROGRESS NOTES
SUBJECTIVE:                                                    Murray Nicholson is a 62 year old male who presents to clinic today for the following health issues:      Hospital Follow-up Visit:  Post surgery F/U   Hospital/Nursing Home/IP Rehab Facility: [facility]  Date of Admission: 6/26/2017  Date of Discharge: 6/30/2017  Reason(s) for Admission: Peritoneal catheter             Problems taking medications regularly:  None       Medication changes since discharge: None       Problems adhering to non-medication therapy:  None    Summary of hospitalization:  [facility] discharge summary reviewed  Diagnostic Tests/Treatments reviewed.  Follow up needed: none  Other Healthcare Providers Involved in Patient s Care:         Specialist appointment - nephrology and Surgical follow-up appointment - vascular access team  Update since discharge: improved.  He was admitten with a CHF exacerbation and the symptoms   of cough/ blood y sputum and chest pain have disappeared. He also had dysurai which is gone.     Post Discharge Medication Reconciliation: discharge medications reconciled, continue medications without change.  Plan of care communicated with patient     Coding guidelines for this visit:  Type of Medical   Decision Making Face-to-Face Visit       within 7 Days of discharge Face-to-Face Visit        within 14 days of discharge   Moderate Complexity [id number]   High Complexity [id number]          He has follow up in place with numerous specialist and is arranging for peritoneal dialysis and is on the transplant list. His mood is doing ok through all this.     OBJECTIVE: /73  Pulse 94  Temp 98.1  F (36.7  C) (Oral)  Resp 18  Wt 186 lb 8 oz (84.6 kg)  SpO2 100%  BMI 27.54 kg/m2 no apparent distress Exam:  GENERAL APPEARANCE: healthy, alert and no distress  EYES: Eyes grossly normal to inspection  NECK: no adenopathy, no asymmetry, masses, or scars and thyroid normal to  palpation  CV: 3/6 systolic murmur  RESP: lungs clear to auscultation - no rales, rhonchi or wheezes  ABDOMEN:  soft, nontender, no HSM or masses and bowel sounds normal  PSYCH: mentation appears normal and affect normal/bright     His surgical sites appear to be healing well.       ICD-10-CM    1. Systolic congestive heart failure, unspecified congestive heart failure chronicity (H) I50.20    2. CKD (chronic kidney disease) stage 5, GFR less than 15 ml/min (H) N18.5    3. Anemia in stage 5 chronic kidney disease (H) N18.5     D63.1     overall doing well despite recent chf exacerbation at his suspected dry weight of 182#.

## 2017-07-06 NOTE — NURSING NOTE
"Chief Complaint   Patient presents with     Hospital F/U       Initial /73  Pulse 94  Temp 98.1  F (36.7  C) (Oral)  Resp 18  Wt 186 lb 8 oz (84.6 kg)  SpO2 100%  BMI 27.54 kg/m2 Estimated body mass index is 27.54 kg/(m^2) as calculated from the following:    Height as of 7/5/17: 5' 9\" (1.753 m).    Weight as of this encounter: 186 lb 8 oz (84.6 kg).  Medication Reconciliation: complete     Genie Haley MA      "

## 2017-07-06 NOTE — MR AVS SNAPSHOT
After Visit Summary   7/6/2017    Murray Nicholson    MRN: [id number]           Patient Information     Date Of Birth          1955        Visit Information        Provider Department      7/6/2017 11:40 AM Yahir Turcios MD [facility]        Today's Diagnoses     Systolic congestive heart failure, unspecified congestive heart failure chronicity (H)    -  1    CKD (chronic kidney disease) stage 5, GFR less than 15 ml/min (H)        Anemia in stage 5 chronic kidney disease (H)           Follow-ups after your visit        Your next 10 appointments already scheduled     Jul 19, 2017 12:30 PM CDT   LAB with  LAB   [facility] ([facility])    [address] [place] [id number] [phone]           Patient must bring picture ID.  Patient should be prepared to give a urine specimen  Please do not eat 10-12 hours before your appointment if you are coming in fasting for labs on lipids, cholesterol, or glucose (sugar).  Pregnant women should follow their Care Team instructions. Water with medications is okay. Do not drink coffee or other fluids.   If you have concerns about taking  your medications, please ask at office or if scheduling via [employer], send a message by clicking on Secure Messaging, Message Your Care Team.            Jul 19, 2017  1:00 PM CDT   Nurse Visit with [facility] Nurse   [facility] ([facility])    [address] [place] [id number] [phone]            Aug 02, 2017 12:30 PM CDT   Lab with  LAB   [facility] ([facility])    [address] [place] [id number] [phone]            Aug 02, 2017  1:00 PM CDT   (Arrive by 12:45 PM)   CORE RETURN with Kristi Ayon NP   [facility] ([facility])    [address] [place] [id number]    [phone]            Aug 02, 2017  2:30 PM CDT   (Arrive by 2:00 PM)   Return Visit with Brice Caraballo MD   [facility] Nephrology ([facility])    [address] [address] [phone]            Aug 18, 2017  8:30 AM CDT   (Arrive by 8:15 AM)   Return Visit with Darvin Tang MD   [facility] Rheumatology ([facility])    [address] [address] [phone]              Who to contact     If you have questions or need follow up information about today's clinic visit or your schedule please contact [facility] directly at [phone].  Normal or non-critical lab and imaging results will be communicated to you by MyChart, letter or phone within 4 business days after the clinic has received the results. If you do not hear from us within 7 days, please contact the clinic through [employer] or phone. If you have a critical or abnormal lab result, we will notify you by phone as soon as possible.  Submit refill requests through [employer] or call your pharmacy and they will forward the refill request to us. Please allow 3 business days for your refill to be completed.          Additional Information About Your Visit        [employer] Information     [employer] gives you secure access to your electronic health record. If you see a primary care provider, you can also send messages to your care team and make appointments. If you have questions, please call your primary care clinic.  If you do not have a primary care provider, please call [phone] and they will assist you.        Care EveryWhere ID     This is your Care EveryWhere ID. This could be used by other organizations to access your [facility] medical records  [id number]        Your Vitals Were     Pulse Temperature Respirations Pulse Oximetry BMI (Body Mass Index)       94 98.1  F (36.7  C) (Oral) 18 100% 27.54 kg/m2         Blood Pressure from Last 3 Encounters:   07/06/17 136/73   07/05/17 133/76   07/05/17 131/74    Weight from Last 3 Encounters:   07/06/17 186 lb 8 oz (84.6 kg)   07/05/17 186 lb 4.8 oz (84.5 kg)   06/30/17 185 lb 3.2 oz (84 kg)              Today, you had the following     No orders found for display       Primary Care Provider Office Phone # Fax #    Yahir Turcios [phone]       [facility] [address]  [facility] [address]        Equal Access to Services     [facility] Center[facility] : Hadii marsha ku hadasho Soomaali, waaxda luqadaha, qaybta kaalmada adeegyada, jose palencia . So [facility] [phone].    ATENCIÓN: Si habla español, tiene a ortiz disposición servicios gratuitos de asistencia lingüística. Ll[facility] [phone].    We comply with applicable federal civil rights laws and [place] laws. We do not discriminate on the basis of race, color, national origin, age, disability sex, sexual orientation or gender identity.            Thank you!     Thank you for choosing [facility]  for your care. Our goal is always to provide you with excellent care. Hearing back from our patients is one way we can continue to improve our services. Please take a few minutes to complete the written survey that you may receive in the mail after your visit with us. Thank you!             Your Updated Medication List - Protect others around you: Learn how to safely use, store and throw away your medicines at www.disposemymeds.org.          This list is accurate as of: 7/6/17 11:59 PM.  Always use your most recent med list.                   Brand Name Dispense Instructions for use Diagnosis    acetaminophen 325 MG tablet    TYLENOL    100 tablet    Take 1-2 tablets (325-650 mg) by mouth every 4 hours as needed for mild pain Do not take more than 10 tablets in 24 hours    Gout, unspecified cause, unspecified chronicity, unspecified site       albuterol 108 (90 BASE) MCG/ACT  Inhaler    PROAIR HFA/PROVENTIL HFA/VENTOLIN HFA    1 Inhaler    Inhale 2 puffs into the lungs every 6 hours as needed for shortness of breath / dyspnea or wheezing    Cough       allopurinol 100 MG tablet    ZYLOPRIM    90 tablet    Take 2 tablets (200 mg) by mouth daily Start with 150 mg daily for 2 weeks then go to 200 mg daily.    Gout, unspecified cause, unspecified chronicity, unspecified site       amLODIPine 5 MG tablet    NORVASC    30 tablet    Take 1 tablet (5 mg) by mouth daily    Hypertension goal BP (blood pressure) < 130/80       aspirin 81 MG tablet      Take 1 tablet (81 mg) by mouth at bedtime        atorvastatin 40 MG tablet    LIPITOR    90 tablet    Take 1 tablet (40 mg) by mouth daily    CKD (chronic kidney disease) stage 3, GFR 30-59 ml/min, Hyperlipidemia LDL goal <100       bumetanide 2 MG tablet    BUMEX    180 tablet    Take 3 tablets (6 mg) by mouth 2 times daily    Systolic congestive heart failure, unspecified congestive heart failure chronicity (H)       calcitRIOL 0.25 MCG capsule    ROCALTROL    90 capsule    Take 1 capsule (0.25 mcg) by mouth daily    Hypocalcemia       darbepoetin emily 100 MCG/0.5ML injection    ARANESP (ALBUMIN FREE)    0.5 mL    Inject 0.5 mLs (100 mcg) Subcutaneous every 14 days As needed for hgb<10g/dL.  If Hgb increases >1 point in 2 weeks (if blood transfusion given, use hgb PRIOR to this), SYSTOLIC BP > 180 mmHg or hgb>=10g/dL, HOLD DOSE. Dose must be within 1 week of Hgb.  Per anemia protocol with Brice Caraballo MD    Anemia in stage 5 chronic kidney disease (H), CKD (chronic kidney disease) stage 5, GFR less than 15 ml/min (H)       docusate sodium 100 MG capsule    COLACE    60 capsule    Take 1 capsule (100 mg) by mouth 2 times daily    Constipation, unspecified constipation type       ferrous sulfate 325 (65 FE) MG tablet    IRON    200 tablet    Take 1 tablet (325 mg) by mouth    Anemia in stage 5 chronic kidney disease (H), CKD (chronic kidney disease)  stage 5, GFR less than 15 ml/min (H)       glipiZIDE 5 MG tablet    GLUCOTROL    90 tablet    Take 1 tablet by mouth. TAKE 1 TABLET BY MOUTH DAILY BEFORE A MEAL    Type 2 diabetes mellitus with other specified complication (H)       hydrALAZINE 50 MG tablet    APRESOLINE    270 tablet    Take 1 tablet (50 mg) by mouth 3 times daily    Type 2 diabetes mellitus with stage 5 chronic kidney disease not on chronic dialysis, without long-term current use of insulin (H)       isosorbide mononitrate 60 MG 24 hr tablet    IMDUR    90 tablet    Take 1 tablet (60 mg) by mouth daily    Chronic systolic congestive heart failure (H)       loratadine 10 MG tablet    CLARITIN     Take 10 mg by mouth daily as needed Reported on 5/3/2017    Hypertensive cardiopathy, SOB (shortness of breath)       losartan 100 MG tablet    COZAAR    90 tablet    Take 1 tablet (100 mg) by mouth daily    Renal hypertension, stage 1-4 or unspecified chronic kidney disease       metolazone 2.5 MG tablet    ZAROXOLYN    90 tablet    Take 1 tablet (2.5 mg) by mouth every other day    Other hypervolemia       metoprolol 100 MG 24 hr tablet    TOPROL XL    90 tablet    Take 1 tablet (100 mg) by mouth daily    Chronic systolic congestive heart failure (H)       omeprazole 20 MG CR capsule    priLOSEC     Take 20 mg by mouth daily At night        potassium chloride SA 20 MEQ CR tablet    K-DUR/KLOR-CON M    90 tablet    Take 40mEq in the morning (2 tabs) and 20mEq (1 tab) in the evening for a total of 60mEq daily.    Combined systolic and diastolic congestive heart failure, unspecified congestive heart failure chronicity (H), Hypervolemia, unspecified hypervolemia type, Hospital discharge follow-up       predniSONE 10 MG tablet    DELTASONE    30 tablet    Take 1 tablet (10 mg) by mouth daily    Gout, unspecified cause, unspecified chronicity, unspecified site       sodium bicarbonate 650 MG tablet     180 tablet    TAKE 2 TABLETS(1300 MG) BY MOUTH THREE TIMES  DAILY    Chronic kidney disease, unspecified

## 2017-07-07 ENCOUNTER — TELEPHONE (OUTPATIENT)
Dept: TRANSPLANT | Facility: CLINIC | Age: 62
End: 2017-07-07

## 2017-07-07 DIAGNOSIS — N18.5 CKD (CHRONIC KIDNEY DISEASE) STAGE 5, GFR LESS THAN 15 ML/MIN (H): Primary | ICD-10-CM

## 2017-07-07 DIAGNOSIS — T85.611A: ICD-10-CM

## 2017-07-07 PROCEDURE — 74020 XR ABDOMEN 2 VW: CPT

## 2017-07-07 RX ORDER — FERROUS SULFATE 325(65) MG
1 TABLET ORAL
Qty: 200 TABLET | Refills: 3 | COMMUNITY
Start: 2017-07-07 | End: 2017-12-22

## 2017-07-07 NOTE — TELEPHONE ENCOUNTER
S/P PD catheter placement with Dr. Arvizu on 6/22/2017.  Received a call from Eliseo Miles PD training RN, who reported PD cath infused PD fluid by gravity, but failed to do so at his first PD training clinic visit today. PD RN attempted to flush PD fluid via a 30 mL syring into PD catheter, C/O shooting pain down to scrotum and pelvis area. And also C/O the same pain when aspirated fluid from a 30 mL syringe. Currently, the patient is not initiating dialysis yet.  Writer recommended the patient to have abdominal x ray this afternoon and will discuss Dr. Arvizu and Tx fellow for plan, she verbalized acceptance and understanding of plan.  Writer called the patient for plan, he reports that shooting pain is between scrotum and anus when flushed PD fluid via a 30 mL syring into PD catheter. He was in a sitting position in a recliner with his feet up. He also reports he has 2-3 good bowel movement today.  He is instructed to go to a [place] clinic for abdominal xray and will F/U imaging for plan, he verbalized acceptance and understanding of plan.  Reviewed and discussed abdominal x ray imaging with Tx fellow Dr. Grajeda, who stated that Dr. Arvizu recommended Miralax over the weekend and F/U with PD training RN next Monday.  Writer called the patient for such recommendations,  he verbalized acceptance and understanding of plan. Writer called Eliseo Miles PD training RN  And left .  Dr. Mcpherson notified via page.

## 2017-07-08 ENCOUNTER — HEALTH MAINTENANCE LETTER (OUTPATIENT)
Age: 62
End: 2017-07-08

## 2017-07-10 ENCOUNTER — TELEPHONE (OUTPATIENT)
Dept: TRANSPLANT | Facility: CLINIC | Age: 62
End: 2017-07-10

## 2017-07-10 NOTE — TELEPHONE ENCOUNTER
called [facility] PD program and spoke with Edith JIMENEZ, who reported that she received writer's VM and scheduled appt for the patient to re-evaluate PD cath function at 12 pm today. She also reported that the patient did not take Miralax over the weekend. Writer recommended to call back to  for result after PD cath assessment, she verbalized acceptance and understanding of plan.  Received a call from Dr. Mcpherson to discuss a surgical plan might be needed. Rajanr recommended to have PD RN to evaluate PD cath function today and will discuss with Dr. Dr. Arvizu for plan, she verbalized acceptance and understanding of plan.   made another follow up call to [facility] PD program and spoke with Talia JIMENEZ. She reported that Zelda JIMENEZ infused 2000 mL PD solution into PD cath with acceptable flow, 1500 mL PD fluid was drained. The patient C/O mild abdominal pain during filled/drained cycle, but much lesser painful than last week. The patient is scheduled to return for full training session on 7/24/2017 D/T busy schedule of PD training program.  Writer verbalized acceptance and understanding of plan.

## 2017-07-12 ENCOUNTER — CARE COORDINATION (OUTPATIENT)
Dept: NEPHROLOGY | Facility: CLINIC | Age: 62
End: 2017-07-12

## 2017-07-13 NOTE — PROGRESS NOTES
Reason for Call    Spoke with patient. Said he's been doing okay. Training has been postponed a bit due to staffing issues at [facility]. He will return to work next week, and start training the week after (7/24). Had issues with flushing the catheter at [facility], which they have followed up with Sum to further discuss.      Plan    1. Follow up in 2 weeks    Patient was given an opportunity to ask questions and have those questions answered to his satisfaction.  Patient verbalized understanding of instructions provided and agreed to plan of care.    Carla Kyle RN

## 2017-07-14 DIAGNOSIS — M1A.0390 CHRONIC GOUT OF WRIST, UNSPECIFIED CAUSE, UNSPECIFIED LATERALITY: Primary | ICD-10-CM

## 2017-07-14 NOTE — TELEPHONE ENCOUNTER
----- Message from Kim Arauz LPN sent at 7/11/2017  8:35 AM CDT -----  Regarding: FW: Allopurinol      ----- Message -----     From: Darvin Tang MD     Sent: 7/10/2017   9:09 AM       To: Adult Rheum Triage-  Subject: Allopurinol                                      Hi team,    I tried to call Mr. Nicholson a few times to ask him to increase his allopurinol to 300 mg daily in light of his recent lab results. I couldn't get him on the line myself.    Can someone try to reach out to him to relay my message this week?    Thanks.    Ilya

## 2017-07-14 NOTE — TELEPHONE ENCOUNTER
Writer left patient a voice message attempting to inform him of provider's note below.   Kim Arauz LPN

## 2017-07-18 ENCOUNTER — OFFICE VISIT (OUTPATIENT)
Dept: FAMILY MEDICINE | Facility: CLINIC | Age: 62
End: 2017-07-18
Payer: COMMERCIAL

## 2017-07-18 VITALS
OXYGEN SATURATION: 98 % | WEIGHT: 190.6 LBS | HEART RATE: 89 BPM | SYSTOLIC BLOOD PRESSURE: 114 MMHG | DIASTOLIC BLOOD PRESSURE: 69 MMHG | TEMPERATURE: 97.6 F | RESPIRATION RATE: 12 BRPM | BODY MASS INDEX: 28.15 KG/M2

## 2017-07-18 DIAGNOSIS — I50.20 SYSTOLIC CONGESTIVE HEART FAILURE, UNSPECIFIED CONGESTIVE HEART FAILURE CHRONICITY: ICD-10-CM

## 2017-07-18 DIAGNOSIS — N18.5 CKD (CHRONIC KIDNEY DISEASE) STAGE 5, GFR LESS THAN 15 ML/MIN (H): Primary | ICD-10-CM

## 2017-07-18 PROCEDURE — 99213 OFFICE O/P EST LOW 20 MIN: CPT | Performed by: FAMILY MEDICINE

## 2017-07-18 NOTE — NURSING NOTE
"Chief Complaint   Patient presents with     Letter for School/Work       Initial /69 (BP Location: Right arm)  Pulse 89  Temp 97.6  F (36.4  C) (Oral)  Resp 12  Wt 190 lb 9.6 oz (86.5 kg)  SpO2 98%  BMI 28.15 kg/m2 Estimated body mass index is 28.15 kg/(m^2) as calculated from the following:    Height as of 7/5/17: 5' 9\" (1.753 m).    Weight as of this encounter: 190 lb 9.6 oz (86.5 kg).  Medication Reconciliation: complete    "

## 2017-07-18 NOTE — PROGRESS NOTES
SUBJECTIVE:                                                    Murray Nicholson is a 62 year old male who presents to clinic today for the following health issues:    Patient presents today for a medical clearance to return back to work, was on medical leave for 4 weeks s/p surgery for access for peritoneal dialysis. He work requires only light lifting but he does stand a lot. He thinks he can do most standing but unsure if he can for extended time periods.       ICD-10-CM    1. CKD (chronic kidney disease) stage 5, GFR less than 15 ml/min (H) N18.5    2. Systolic congestive heart failure, unspecified congestive heart failure chronicity (H) I50.20       Letter written for work. Ok to return. Time of visit > 10 minutes greater than half in counseling and coordination of care.

## 2017-07-18 NOTE — MR AVS SNAPSHOT
After Visit Summary   7/18/2017    Murray Nicholson    MRN: [id number]           Patient Information     Date Of Birth          1955        Visit Information        Provider Department      7/18/2017 2:00 PM Yahir Turcios MD [facility]        Today's Diagnoses     CKD (chronic kidney disease) stage 5, GFR less than 15 ml/min (H)    -  1    Systolic congestive heart failure, unspecified congestive heart failure chronicity (H)           Follow-ups after your visit        Your next 10 appointments already scheduled     Aug 02, 2017 12:30 PM CDT   Lab with  LAB   [facility] ([facility])    [address] [place] [id number]   [phone]            Aug 02, 2017  1:00 PM CDT   (Arrive by 12:45 PM)   CORE RETURN with Kristi Ayon NP   [facility] Heart Care ([facility])    [address] [place] [id number] [phone]            Aug 02, 2017  2:30 PM CDT   (Arrive by 2:00 PM)   Return Visit with Brice Caraballo MD   [facility] Nephrology ([facility])    [address] [place] [id number] [phone]            Aug 18, 2017  8:30 AM CDT   (Arrive by 8:15 AM)   Return Visit with Darvin Tang MD   [facility] Rheumatology ([facility])    [address] [place] [id number] [phone]              Who to contact     If you have questions or need follow up information about today's clinic visit or your schedule please contact [facility] directly at [phone].  Normal or non-critical lab and imaging results will be communicated to you by MyChart, letter or phone within 4 business days after the clinic has received the results. If you do not hear from us within 7 days, please contact the clinic through MyChart or phone. If you have a critical or abnormal lab  result, we will notify you by phone as soon as possible.  Submit refill requests through [facility] or call your pharmacy and they will forward the refill request to us. Please allow 3 business days for your refill to be completed.          Additional Information About Your Visit        [employer] Information     [facility] gives you secure access to your electronic health record. If you see a primary care provider, you can also send messages to your care team and make appointments. If you have questions, please call your primary care clinic.  If you do not have a primary care provider, please call [phone] and they will assist you.        Care EveryWhere ID     This is your Care EveryWhere ID. This could be used by other organizations to access your [place] medical records  [id number]        Your Vitals Were     Pulse Temperature Respirations Pulse Oximetry BMI (Body Mass Index)       89 97.6  F (36.4  C) (Oral) 12 98% 28.15 kg/m2        Blood Pressure from Last 3 Encounters:   07/18/17 114/69   07/06/17 136/73   07/05/17 133/76    Weight from Last 3 Encounters:   07/18/17 190 lb 9.6 oz (86.5 kg)   07/06/17 186 lb 8 oz (84.6 kg)   07/05/17 186 lb 4.8 oz (84.5 kg)              Today, you had the following     No orders found for display       Primary Care Provider Office Phone # Fax #    Yahir Alex Turcios [phone]       [place] [address]  [facility] [address]        Equal Access to Services     [facility] Center[facility] AH: Hadii aad ku hadasho Soomaali, waaxda luqadaha, qaybta kaalmada adeegyada, jose palencia . So [facility] [phone].    ATENCIÓN: Si habla español, tiene a ortiz disposición servicios gratuitos de asistencia lingüística. Llame al [phone].    We comply with applicable federal civil rights laws and [place] laws. We do not discriminate on the basis of race, color, national origin, age, disability sex, sexual orientation or gender identity.             Thank you!     Thank you for choosing [facility]  for your care. Our goal is always to provide you with excellent care. Hearing back from our patients is one way we can continue to improve our services. Please take a few minutes to complete the written survey that you may receive in the mail after your visit with us. Thank you!             Your Updated Medication List - Protect others around you: Learn how to safely use, store and throw away your medicines at www.disposemymeds.org.          This list is accurate as of: 7/18/17 11:59 PM.  Always use your most recent med list.                   Brand Name Dispense Instructions for use Diagnosis    acetaminophen 325 MG tablet    TYLENOL    100 tablet    Take 1-2 tablets (325-650 mg) by mouth every 4 hours as needed for mild pain Do not take more than 10 tablets in 24 hours    Gout, unspecified cause, unspecified chronicity, unspecified site       albuterol 108 (90 BASE) MCG/ACT Inhaler    PROAIR HFA/PROVENTIL HFA/VENTOLIN HFA    1 Inhaler    Inhale 2 puffs into the lungs every 6 hours as needed for shortness of breath / dyspnea or wheezing    Cough       allopurinol 100 MG tablet    ZYLOPRIM    90 tablet    Take 2 tablets (200 mg) by mouth daily Start with 150 mg daily for 2 weeks then go to 200 mg daily.    Gout, unspecified cause, unspecified chronicity, unspecified site       amLODIPine 5 MG tablet    NORVASC    30 tablet    Take 1 tablet (5 mg) by mouth daily    Hypertension goal BP (blood pressure) < 130/80       aspirin 81 MG tablet      Take 1 tablet (81 mg) by mouth at bedtime        atorvastatin 40 MG tablet    LIPITOR    90 tablet    Take 1 tablet (40 mg) by mouth daily    CKD (chronic kidney disease) stage 3, GFR 30-59 ml/min, Hyperlipidemia LDL goal <100       bumetanide 2 MG tablet    BUMEX    180 tablet    Take 3 tablets (6 mg) by mouth 2 times daily    Systolic congestive heart failure, unspecified congestive heart failure  chronicity (H)       calcitRIOL 0.25 MCG capsule    ROCALTROL    90 capsule    Take 1 capsule (0.25 mcg) by mouth daily    Hypocalcemia       darbepoetin emily 100 MCG/0.5ML injection    ARANESP (ALBUMIN FREE)    0.5 mL    Inject 0.5 mLs (100 mcg) Subcutaneous every 14 days As needed for hgb<10g/dL.  If Hgb increases >1 point in 2 weeks (if blood transfusion given, use hgb PRIOR to this), SYSTOLIC BP > 180 mmHg or hgb>=10g/dL, HOLD DOSE. Dose must be within 1 week of Hgb.  Per anemia protocol with Brice Caraballo MD    Anemia in stage 5 chronic kidney disease (H), CKD (chronic kidney disease) stage 5, GFR less than 15 ml/min (H)       docusate sodium 100 MG capsule    COLACE    60 capsule    Take 1 capsule (100 mg) by mouth 2 times daily    Constipation, unspecified constipation type       ferrous sulfate 325 (65 FE) MG tablet    IRON    200 tablet    Take 1 tablet (325 mg) by mouth    Anemia in stage 5 chronic kidney disease (H), CKD (chronic kidney disease) stage 5, GFR less than 15 ml/min (H)       glipiZIDE 5 MG tablet    GLUCOTROL    90 tablet    Take 1 tablet by mouth. TAKE 1 TABLET BY MOUTH DAILY BEFORE A MEAL    Type 2 diabetes mellitus with other specified complication (H)       hydrALAZINE 50 MG tablet    APRESOLINE    270 tablet    Take 1 tablet (50 mg) by mouth 3 times daily    Type 2 diabetes mellitus with stage 5 chronic kidney disease not on chronic dialysis, without long-term current use of insulin (H)       isosorbide mononitrate 60 MG 24 hr tablet    IMDUR    90 tablet    Take 1 tablet (60 mg) by mouth daily    Chronic systolic congestive heart failure (H)       loratadine 10 MG tablet    CLARITIN     Take 10 mg by mouth daily as needed Reported on 5/3/2017    Hypertensive cardiopathy, SOB (shortness of breath)       losartan 100 MG tablet    COZAAR    90 tablet    Take 1 tablet (100 mg) by mouth daily    Renal hypertension, stage 1-4 or unspecified chronic kidney disease       metolazone 2.5 MG tablet     ZAROXOLYN    90 tablet    Take 1 tablet (2.5 mg) by mouth every other day    Other hypervolemia       metoprolol 100 MG 24 hr tablet    TOPROL XL    90 tablet    Take 1 tablet (100 mg) by mouth daily    Chronic systolic congestive heart failure (H)       omeprazole 20 MG CR capsule    priLOSEC     Take 20 mg by mouth daily At night        potassium chloride SA 20 MEQ CR tablet    K-DUR/KLOR-CON M    90 tablet    Take 40mEq in the morning (2 tabs) and 20mEq (1 tab) in the evening for a total of 60mEq daily.    Combined systolic and diastolic congestive heart failure, unspecified congestive heart failure chronicity (H), Hypervolemia, unspecified hypervolemia type, Hospital discharge follow-up       predniSONE 10 MG tablet    DELTASONE    30 tablet    Take 1 tablet (10 mg) by mouth daily    Gout, unspecified cause, unspecified chronicity, unspecified site       sodium bicarbonate 650 MG tablet     180 tablet    TAKE 2 TABLETS(1300 MG) BY MOUTH THREE TIMES DAILY    Chronic kidney disease, unspecified

## 2017-07-18 NOTE — LETTER
[facility]   [address], [place]  [address]  [phone]        July 18, 2017      RE: Murray Nicholson  [address]  [place] [address]       To whom it may concern:    Murray Nicholson was seen in our clinic today. He may return to work with the following: No restrictions on or about 7/19/2017.          Sincerely,    Yahir Turcios MD/em

## 2017-07-19 ENCOUNTER — TELEPHONE (OUTPATIENT)
Dept: PHARMACY | Facility: CLINIC | Age: 62
End: 2017-07-19

## 2017-07-19 ENCOUNTER — ALLIED HEALTH/NURSE VISIT (OUTPATIENT)
Dept: TRANSPLANT | Facility: CLINIC | Age: 62
End: 2017-07-19
Attending: INTERNAL MEDICINE
Payer: COMMERCIAL

## 2017-07-19 DIAGNOSIS — N18.5 CKD (CHRONIC KIDNEY DISEASE) STAGE 5, GFR LESS THAN 15 ML/MIN (H): Primary | ICD-10-CM

## 2017-07-19 DIAGNOSIS — D63.1 ANEMIA IN STAGE 5 CHRONIC KIDNEY DISEASE (H): ICD-10-CM

## 2017-07-19 DIAGNOSIS — N18.5 CKD (CHRONIC KIDNEY DISEASE) STAGE 5, GFR LESS THAN 15 ML/MIN (H): ICD-10-CM

## 2017-07-19 DIAGNOSIS — N18.5 ANEMIA IN STAGE 5 CHRONIC KIDNEY DISEASE (H): ICD-10-CM

## 2017-07-19 LAB
FERRITIN SERPL-MCNC: 369 NG/ML (ref 26–388)
HCT VFR BLD AUTO: 30.1 % (ref 40–53)
HGB BLD-MCNC: 10.1 G/DL (ref 13.3–17.7)
IRON SATN MFR SERPL: 18 % (ref 15–46)
IRON SERPL-MCNC: 46 UG/DL (ref 35–180)
TIBC SERPL-MCNC: 263 UG/DL (ref 240–430)

## 2017-07-19 PROCEDURE — 36415 COLL VENOUS BLD VENIPUNCTURE: CPT | Performed by: INTERNAL MEDICINE

## 2017-07-19 PROCEDURE — 83550 IRON BINDING TEST: CPT | Performed by: INTERNAL MEDICINE

## 2017-07-19 PROCEDURE — 85018 HEMOGLOBIN: CPT | Performed by: INTERNAL MEDICINE

## 2017-07-19 PROCEDURE — 83540 ASSAY OF IRON: CPT | Performed by: INTERNAL MEDICINE

## 2017-07-19 PROCEDURE — 82728 ASSAY OF FERRITIN: CPT | Performed by: INTERNAL MEDICINE

## 2017-07-19 PROCEDURE — 85014 HEMATOCRIT: CPT | Performed by: INTERNAL MEDICINE

## 2017-07-19 NOTE — MR AVS SNAPSHOT
After Visit Summary   7/19/2017    Murray Nicholson    MRN: [id number]           Patient Information     Date Of Birth          1955        Visit Information        Provider Department      7/19/2017 1:00 PM Nurse, Rosi [facility] Solid Organ Transplant        Today's Diagnoses     CKD (chronic kidney disease) stage 5, GFR less than 15 ml/min (H)    -  1       Follow-ups after your visit        Your next 10 appointments already scheduled     Aug 02, 2017 12:30 PM CDT   Lab with UC LAB   [facility] ([facility])    [address] [place] [phone]   [phone]            Aug 02, 2017  1:00 PM CDT   (Arrive by 12:45 PM)   CORE RETURN with Kristi Ayon NP   [facility] Heart Care ([facility])    [address] [place] [id number] [phone]            Aug 02, 2017  2:30 PM CDT   (Arrive by 2:00 PM)   Return Visit with Brice Caraballo MD   [facility] Nephrology ([facility])    [address] [place] [phone] [phone]            Aug 18, 2017  8:30 AM CDT   (Arrive by 8:15 AM)   Return Visit with Darvin Tang MD   [facility] Rheumatology ([facility])    [address] [place] [id number] [phone]              Who to contact     If you have questions or need follow up information about today's clinic visit or your schedule please contact [facility] directly at [phone].  Normal or non-critical lab and imaging results will be communicated to you by MyChart, letter or phone within 4 business days after the clinic has received the results. If you do not hear from us within 7 days, please contact the clinic through MyChart or phone. If you have a critical or abnormal lab result, we will notify you by phone as soon as possible.  Submit refill requests through [facility]  or call your pharmacy and they will forward the refill request to us. Please allow 3 business days for your refill to be completed.          Additional Information About Your Visit        [employer] Information     [employer] gives you secure access to your electronic health record. If you see a primary care provider, you can also send messages to your care team and make appointments. If you have questions, please call your primary care clinic.  If you do not have a primary care provider, please call [phone] and they will assist you.        Care EveryWhere ID     This is your Care EveryWhere ID. This could be used by other organizations to access your [place] medical records  [id number]         Blood Pressure from Last 3 Encounters:   07/18/17 114/69   07/06/17 136/73   07/05/17 133/76    Weight from Last 3 Encounters:   07/18/17 86.5 kg (190 lb 9.6 oz)   07/06/17 84.6 kg (186 lb 8 oz)   07/05/17 84.5 kg (186 lb 4.8 oz)              Today, you had the following     No orders found for display       Primary Care Provider Office Phone # Fax #    Yahir Alex Turcios [phone]       [address]  [facility] [address]        Equal Access to Services     JOHN HAUSER : Hadii aad ku hadasho Soomaali, waaxda luqadaha, qaybta kaalmada adeegyada, waxay jay mathewsn gavino ayala. So [facility] [phone].    ATENCIÓN: Si habla español, tiene a ortiz disposición servicios gratuitos de asistencia lingüística. Llame al [phone].    We comply with applicable federal civil rights laws and [place] laws. We do not discriminate on the basis of race, color, national origin, age, disability sex, sexual orientation or gender identity.            Thank you!     Thank you for choosing [facility]  for your care. Our goal is always to provide you with excellent care. Hearing back from our patients is one way we can continue to improve our services. Please take a few minutes  to complete the written survey that you may receive in the mail after your visit with us. Thank you!             Your Updated Medication List - Protect others around you: Learn how to safely use, store and throw away your medicines at www.disposemymeds.org.          This list is accurate as of: 7/19/17  1:32 PM.  Always use your most recent med list.                   Brand Name Dispense Instructions for use Diagnosis    acetaminophen 325 MG tablet    TYLENOL    100 tablet    Take 1-2 tablets (325-650 mg) by mouth every 4 hours as needed for mild pain Do not take more than 10 tablets in 24 hours    Gout, unspecified cause, unspecified chronicity, unspecified site       albuterol 108 (90 BASE) MCG/ACT Inhaler    PROAIR HFA/PROVENTIL HFA/VENTOLIN HFA    1 Inhaler    Inhale 2 puffs into the lungs every 6 hours as needed for shortness of breath / dyspnea or wheezing    Cough       allopurinol 100 MG tablet    ZYLOPRIM    90 tablet    Take 2 tablets (200 mg) by mouth daily Start with 150 mg daily for 2 weeks then go to 200 mg daily.    Gout, unspecified cause, unspecified chronicity, unspecified site       amLODIPine 5 MG tablet    NORVASC    30 tablet    Take 1 tablet (5 mg) by mouth daily    Hypertension goal BP (blood pressure) < 130/80       aspirin 81 MG tablet      Take 1 tablet (81 mg) by mouth at bedtime        atorvastatin 40 MG tablet    LIPITOR    90 tablet    Take 1 tablet (40 mg) by mouth daily    CKD (chronic kidney disease) stage 3, GFR 30-59 ml/min, Hyperlipidemia LDL goal <100       bumetanide 2 MG tablet    BUMEX    180 tablet    Take 3 tablets (6 mg) by mouth 2 times daily    Systolic congestive heart failure, unspecified congestive heart failure chronicity (H)       calcitRIOL 0.25 MCG capsule    ROCALTROL    90 capsule    Take 1 capsule (0.25 mcg) by mouth daily    Hypocalcemia       darbepoetin emily 100 MCG/0.5ML injection    ARANESP (ALBUMIN FREE)    0.5 mL    Inject 0.5 mLs (100 mcg) Subcutaneous  every 14 days As needed for hgb<10g/dL.  If Hgb increases >1 point in 2 weeks (if blood transfusion given, use hgb PRIOR to this), SYSTOLIC BP > 180 mmHg or hgb>=10g/dL, HOLD DOSE. Dose must be within 1 week of Hgb.  Per anemia protocol with Brice Caraballo MD    Anemia in stage 5 chronic kidney disease (H), CKD (chronic kidney disease) stage 5, GFR less than 15 ml/min (H)       docusate sodium 100 MG capsule    COLACE    60 capsule    Take 1 capsule (100 mg) by mouth 2 times daily    Constipation, unspecified constipation type       ferrous sulfate 325 (65 FE) MG tablet    IRON    200 tablet    Take 1 tablet (325 mg) by mouth    Anemia in stage 5 chronic kidney disease (H), CKD (chronic kidney disease) stage 5, GFR less than 15 ml/min (H)       glipiZIDE 5 MG tablet    GLUCOTROL    90 tablet    Take 1 tablet by mouth. TAKE 1 TABLET BY MOUTH DAILY BEFORE A MEAL    Type 2 diabetes mellitus with other specified complication (H)       hydrALAZINE 50 MG tablet    APRESOLINE    270 tablet    Take 1 tablet (50 mg) by mouth 3 times daily    Type 2 diabetes mellitus with stage 5 chronic kidney disease not on chronic dialysis, without long-term current use of insulin (H)       isosorbide mononitrate 60 MG 24 hr tablet    IMDUR    90 tablet    Take 1 tablet (60 mg) by mouth daily    Chronic systolic congestive heart failure (H)       loratadine 10 MG tablet    CLARITIN     Take 10 mg by mouth daily as needed Reported on 5/3/2017    Hypertensive cardiopathy, SOB (shortness of breath)       losartan 100 MG tablet    COZAAR    90 tablet    Take 1 tablet (100 mg) by mouth daily    Renal hypertension, stage 1-4 or unspecified chronic kidney disease       metolazone 2.5 MG tablet    ZAROXOLYN    90 tablet    Take 1 tablet (2.5 mg) by mouth every other day    Other hypervolemia       metoprolol 100 MG 24 hr tablet    TOPROL XL    90 tablet    Take 1 tablet (100 mg) by mouth daily    Chronic systolic congestive heart failure (H)        omeprazole 20 MG CR capsule    priLOSEC     Take 20 mg by mouth daily At night        potassium chloride SA 20 MEQ CR tablet    K-DUR/KLOR-CON M    90 tablet    Take 40mEq in the morning (2 tabs) and 20mEq (1 tab) in the evening for a total of 60mEq daily.    Combined systolic and diastolic congestive heart failure, unspecified congestive heart failure chronicity (H), Hypervolemia, unspecified hypervolemia type, Hospital discharge follow-up       predniSONE 10 MG tablet    DELTASONE    30 tablet    Take 1 tablet (10 mg) by mouth daily    Gout, unspecified cause, unspecified chronicity, unspecified site       sodium bicarbonate 650 MG tablet     180 tablet    TAKE 2 TABLETS(1300 MG) BY MOUTH THREE TIMES DAILY    Chronic kidney disease, unspecified

## 2017-07-19 NOTE — TELEPHONE ENCOUNTER
Anemia Management Note  SUBJECTIVE/OBJECTIVE:  Referred by Dr. Brice Caraballo on 2015  Primary Diagnosis: Anemia in Chronic Kidney Disease (N18.5 D63.1)   Secondary Diagnosis: Chronic Kidney Disease, Stage V (N18.5)  Hgb goal range: 9-10  Epo/Darbo: Aranesp 100 mcg every 2 weeks as needed - in clinic  RX order expires on 18  Iron regimen: Ferrous Sulfate once daily - 17  Lab orders  on 2017 In Epic    Anemia Latest Ref Rng & Units 5/3/2017 2017 2017 2017 2017 2017 2017   ANASTASIYA Dose - 100 mcg 100 mcg 100 mcg 100 mcg - 100 mcg -   Hemoglobin 13.3 - 17.7 g/dL 9.7(L) 9.3(L) 9.9(L) 9.8(L) 9.4(L) 8.9(L) 10.1(L)   IV Iron Dose - - - - - - - -   TSAT 15 - 46 % - 20 - 29 - 12(L) 18   Ferritin 26 - 388 ng/mL - 806(H) - 557(H) - 542(H) 369      BP Readings from Last 3 Encounters:   17 114/69   17 136/73   17 133/76     Wt Readings from Last 2 Encounters:   17 190 lb 9.6 oz (86.5 kg)   17 186 lb 8 oz (84.6 kg)     ASSESSMENT:  Hgb: Above goal - recommend hold dose  TSat:  not at goal of >30% but improved Ferritin:  At goal (>100ng/mL). Continue ferrous sulfate once daily.     PLAN:  Hold Aranesp and RTC for hgb then aranesp if needed in 2 week(s)    Orders needed to be renewed (for next follow-up date) in [facility]: None    Iron labs due:  17    Plan discussed with:   Carla in clinic  Plan provided by:  Lakisha    NEXT FOLLOW-UP DATE:  17    Anemia Management Service  Zelda Rich,EllisD and Rosa Marcial CPhT  Phone: [phone]  Fax: [phone]

## 2017-07-21 RX ORDER — ALLOPURINOL 300 MG/1
300 TABLET ORAL DAILY
Qty: 30 TABLET | Refills: 2 | Status: SHIPPED | OUTPATIENT
Start: 2017-07-21 | End: 2017-08-18

## 2017-07-21 NOTE — TELEPHONE ENCOUNTER
Writer spoke with patient and was able to inform him of provider's note. Patient stated understanding and reported that he will need a new Rx as he will be out of the current Rx is a few days. Writer will route Rx to provider to review and advise.   Kim Arauz LPN

## 2017-07-25 ENCOUNTER — PRE VISIT (OUTPATIENT)
Dept: CARDIOLOGY | Facility: CLINIC | Age: 62
End: 2017-07-25

## 2017-07-25 DIAGNOSIS — I50.22 CHRONIC SYSTOLIC HEART FAILURE (H): Primary | ICD-10-CM

## 2017-07-25 DIAGNOSIS — N18.9 CHRONIC KIDNEY DISEASE, UNSPECIFIED: ICD-10-CM

## 2017-07-25 DIAGNOSIS — M10.9 GOUT, UNSPECIFIED CAUSE, UNSPECIFIED CHRONICITY, UNSPECIFIED SITE: ICD-10-CM

## 2017-07-25 NOTE — TELEPHONE ENCOUNTER
predniSONE (DELTASONE) 10 MG tablet      Last Written Prescription Date:  6/2/2017  Last Fill Quantity: 30,   # refills: 1  Last Office Visit : 6/2/2017  Future Office visit:  8/18/2017    Routing refill request to provider for review/approval because:  Per Rheumatology protocol for Prednisone dose =/> 10 mg daily dose.

## 2017-07-26 RX ORDER — PREDNISONE 10 MG/1
10 TABLET ORAL DAILY
Qty: 90 TABLET | Refills: 1 | Status: SHIPPED | OUTPATIENT
Start: 2017-07-26 | End: 2017-08-18

## 2017-07-27 ENCOUNTER — CARE COORDINATION (OUTPATIENT)
Dept: NEPHROLOGY | Facility: CLINIC | Age: 62
End: 2017-07-27

## 2017-07-27 DIAGNOSIS — N18.5 CKD (CHRONIC KIDNEY DISEASE) STAGE 5, GFR LESS THAN 15 ML/MIN (H): Primary | ICD-10-CM

## 2017-07-28 DIAGNOSIS — I12.9 RENAL HYPERTENSION, STAGE 1-4 OR UNSPECIFIED CHRONIC KIDNEY DISEASE: ICD-10-CM

## 2017-07-29 DIAGNOSIS — N18.9 CHRONIC KIDNEY DISEASE, UNSPECIFIED: ICD-10-CM

## 2017-07-29 DIAGNOSIS — E87.70 HYPERVOLEMIA, UNSPECIFIED HYPERVOLEMIA TYPE: ICD-10-CM

## 2017-07-29 DIAGNOSIS — I50.40 COMBINED SYSTOLIC AND DIASTOLIC CONGESTIVE HEART FAILURE, UNSPECIFIED CONGESTIVE HEART FAILURE CHRONICITY: ICD-10-CM

## 2017-07-29 DIAGNOSIS — Z09 HOSPITAL DISCHARGE FOLLOW-UP: ICD-10-CM

## 2017-07-29 NOTE — TELEPHONE ENCOUNTER
LOSARTAN 100MG TABLETS  In chart as: losartan (COZAAR) 100 MG tablet  TAKE 1 TABLET(100 MG) BY MOUTH DAILY       Disp: 90 tablet Refills: 0    Class: E-Prescribe Start: 7/28/2017   For: Renal hypertension, stage 1-4 or unspecified chronic kidney disease  Originally ordered: 3 months ago by Ronel Lorenzo MD  Last refill:7/1/2017    Last Office Visit with [facility], [facility] or [facility] prescribing provider: 7/18/17       Potassium   Date Value Ref Range Status   07/05/2017 3.8 3.4 - 5.3 mmol/L Final     Creatinine   Date Value Ref Range Status   07/05/2017 6.12 (H) 0.66 - 1.25 mg/dL Final     BP Readings from Last 3 Encounters:   07/18/17 114/69   07/06/17 136/73   07/05/17 133/76

## 2017-07-31 RX ORDER — LOSARTAN POTASSIUM 100 MG/1
TABLET ORAL
Qty: 90 TABLET | Refills: 0 | Status: SHIPPED | OUTPATIENT
Start: 2017-07-31 | End: 2017-11-28

## 2017-07-31 NOTE — TELEPHONE ENCOUNTER
sodium bicarbonate 650 MG tablet      Last Written Prescription Date: 6/16/2017  Last Fill Quantity: 180,  # refills: 0   Last Office Visit with [facility], [facility] or [facility] prescribing provider: 7/18/2017    Routing refill request to provider for review/approval because:  Drug not on the [facility] refill protocol     Thank you,  Stephanie Jose RN

## 2017-08-01 ENCOUNTER — TELEPHONE (OUTPATIENT)
Dept: PHARMACY | Facility: CLINIC | Age: 62
End: 2017-08-01

## 2017-08-01 RX ORDER — SODIUM BICARBONATE 650 MG/1
TABLET ORAL
Qty: 180 TABLET | Refills: 0 | Status: SHIPPED | OUTPATIENT
Start: 2017-08-01 | End: 2017-09-20

## 2017-08-01 NOTE — TELEPHONE ENCOUNTER
----- Message from Ana Luisa Mcpherson MD sent at 7/31/2017 11:12 AM CDT -----  Regarding: wednesday  Murray Narvaez will finish PD training the end of this week.  I will give him this week in PD clinic (I arbitrarily chose 10,000 units - Zelda if you have another suggestion on epo dose I am happy to hear).     We will tell Murray that he does not have to see you this Wednesday.  I cced Domonique on this schedule.    Thanks!    Ana Luisa

## 2017-08-01 NOTE — TELEPHONE ENCOUNTER
[facility] anemia service. Initiating dialysis.     Zelda Rich, PharmD, [facility]  [phone]  August 1, 2017

## 2017-08-02 ENCOUNTER — OFFICE VISIT (OUTPATIENT)
Dept: CARDIOLOGY | Facility: CLINIC | Age: 62
End: 2017-08-02
Attending: NURSE PRACTITIONER
Payer: COMMERCIAL

## 2017-08-02 VITALS
HEIGHT: 68 IN | SYSTOLIC BLOOD PRESSURE: 137 MMHG | BODY MASS INDEX: 28.95 KG/M2 | OXYGEN SATURATION: 99 % | WEIGHT: 191 LBS | HEART RATE: 82 BPM | DIASTOLIC BLOOD PRESSURE: 77 MMHG

## 2017-08-02 DIAGNOSIS — I50.22 CHRONIC SYSTOLIC HEART FAILURE (H): ICD-10-CM

## 2017-08-02 DIAGNOSIS — I50.22 CHRONIC SYSTOLIC HEART FAILURE (H): Primary | ICD-10-CM

## 2017-08-02 DIAGNOSIS — I71.21 ASCENDING AORTIC ANEURYSM (H): ICD-10-CM

## 2017-08-02 DIAGNOSIS — N18.5 CKD (CHRONIC KIDNEY DISEASE) STAGE 5, GFR LESS THAN 15 ML/MIN (H): ICD-10-CM

## 2017-08-02 DIAGNOSIS — E78.5 DYSLIPIDEMIA: ICD-10-CM

## 2017-08-02 DIAGNOSIS — I25.10 CORONARY ARTERY DISEASE INVOLVING NATIVE CORONARY ARTERY OF NATIVE HEART WITHOUT ANGINA PECTORIS: ICD-10-CM

## 2017-08-02 DIAGNOSIS — I10 ESSENTIAL HYPERTENSION: ICD-10-CM

## 2017-08-02 LAB
ALBUMIN SERPL-MCNC: 3.2 G/DL (ref 3.4–5)
ANION GAP SERPL CALCULATED.3IONS-SCNC: 14 MMOL/L (ref 3–14)
BUN SERPL-MCNC: 137 MG/DL (ref 7–30)
CALCIUM SERPL-MCNC: 8.8 MG/DL (ref 8.5–10.1)
CHLORIDE SERPL-SCNC: 102 MMOL/L (ref 94–109)
CO2 SERPL-SCNC: 22 MMOL/L (ref 20–32)
CREAT SERPL-MCNC: 6.74 MG/DL (ref 0.66–1.25)
ERYTHROCYTE [DISTWIDTH] IN BLOOD BY AUTOMATED COUNT: 16.2 % (ref 10–15)
GFR SERPL CREATININE-BSD FRML MDRD: 8 ML/MIN/1.7M2
GLUCOSE SERPL-MCNC: 139 MG/DL (ref 70–99)
HCT VFR BLD AUTO: 31.6 % (ref 40–53)
HGB BLD-MCNC: 10.4 G/DL (ref 13.3–17.7)
MCH RBC QN AUTO: 30.1 PG (ref 26.5–33)
MCHC RBC AUTO-ENTMCNC: 32.9 G/DL (ref 31.5–36.5)
MCV RBC AUTO: 92 FL (ref 78–100)
PHOSPHATE SERPL-MCNC: 4.8 MG/DL (ref 2.5–4.5)
PLATELET # BLD AUTO: 221 10E9/L (ref 150–450)
POTASSIUM SERPL-SCNC: 4.1 MMOL/L (ref 3.4–5.3)
RBC # BLD AUTO: 3.45 10E12/L (ref 4.4–5.9)
SODIUM SERPL-SCNC: 139 MMOL/L (ref 133–144)
URATE SERPL-MCNC: 7.7 MG/DL (ref 3.5–7.2)
WBC # BLD AUTO: 7.1 10E9/L (ref 4–11)

## 2017-08-02 PROCEDURE — 99214 OFFICE O/P EST MOD 30 MIN: CPT | Mod: ZP | Performed by: NURSE PRACTITIONER

## 2017-08-02 PROCEDURE — 99212 OFFICE O/P EST SF 10 MIN: CPT | Mod: ZF

## 2017-08-02 ASSESSMENT — PAIN SCALES - GENERAL: PAINLEVEL: NO PAIN (0)

## 2017-08-02 NOTE — NURSING NOTE
Diet: Patient instructed regarding a heart failure healthy diet, including discussion of reduced fat and 2000 mg daily sodium restriction, daily weights, medication purpose and compliance, fluid restrictions and resources for patient and family to access for assistance with heart failure management.       Labs: Patient was given results of the laboratory testing obtained today and patient was instructed about when to return for the next laboratory testing.    Med Reconcile: Reviewed and verified all current medications with the patient. The updated medication list was printed and given to the patient.    Return Appointment: Patient given instructions regarding scheduling next clinic visit.     Patient stated that he understood all health information given and agreed to call with further questions or concerns.    There are no changes at this time. Patient will begin peritoneal dialysis this Friday.  He is to return to [facility] in 2 weeks with labs prior (8/29 with Patt Gregory).  At that time, he will likely receive orders for an angiogram and CT or MRI of the Ascending Aorta (AA).

## 2017-08-02 NOTE — MR AVS SNAPSHOT
After Visit Summary   8/2/2017    Sanchez Nicholson    MRN: [id number]           Patient Information     Date Of Birth          1955        Visit Information        Provider Department      8/2/2017 1:00 PM Kristi Ayon NP [facility]        Today's Diagnoses     Chronic systolic heart failure (H)    -  1    Essential hypertension        Dyslipidemia        Ascending aortic aneurysm (H)        Coronary artery disease involving native coronary artery of native heart without angina pectoris        CKD (chronic kidney disease) stage 5, GFR less than 15 ml/min (H)          Care Instructions    You were seen today in the Cardiovascular Clinic at the [facility].     Cardiology Providers you saw during your visit: Kristi Ayon NP     1. There are no medication changes at this time.    2. Please make a follow-up CORE/heart failure appt in 2 weeks with labs prior.     Results for SANCHEZ NICHOLSON (MRN [id number]) as of 8/2/2017 13:42   Ref. Range 8/2/2017 13:11   Sodium Latest Ref Range: 133 - 144 mmol/L 139   Potassium Latest Ref Range: 3.4 - 5.3 mmol/L 4.1   Chloride Latest Ref Range: 94 - 109 mmol/L 102   Carbon Dioxide Latest Ref Range: 20 - 32 mmol/L 22   Urea Nitrogen Latest Ref Range: 7 - 30 mg/dL 137 (H)   Creatinine Latest Ref Range: 0.66 - 1.25 mg/dL 6.74 (H)   GFR Estimate Latest Ref Range: >60 mL/min/1.7m2 8 (L)   GFR Estimate If Black Latest Ref Range: >60 mL/min/1.7m2 10 (L)   Calcium Latest Ref Range: 8.5 - 10.1 mg/dL 8.8   Anion Gap Latest Ref Range: 3 - 14 mmol/L 14   Phosphorus Latest Ref Range: 2.5 - 4.5 mg/dL 4.8 (H)   Albumin Latest Ref Range: 3.4 - 5.0 g/dL 3.2 (L)   Uric Acid Latest Ref Range: 3.5 - 7.2 mg/dL 7.7 (H)   Glucose Latest Ref Range: 70 - 99 mg/dL 139 (H)   WBC Latest Ref Range: 4.0 - 11.0 10e9/L 7.1   Hemoglobin Latest Ref Range: 13.3 - 17.7 g/dL 10.4 (L)   Hematocrit Latest Ref Range: 40.0 - 53.0 % 31.6 (L)   Platelet Count Latest Ref Range: 150 -  "450 10e9/L 221   RBC Count Latest Ref Range: 4.4 - 5.9 10e12/L 3.45 (L)   MCV Latest Ref Range: 78 - 100 fl 92   MCH Latest Ref Range: 26.5 - 33.0 pg 30.1   MCHC Latest Ref Range: 31.5 - 36.5 g/dL 32.9   RDW Latest Ref Range: 10.0 - 15.0 % 16.2 (H)       Please limit your fluid intake to 2 L (64 ounces) daily.  2 Liters a day = 8.5 cups, or 72 ounces.  Please limit your salt intake to 2 grams a day or less.    If you gain 2# in 24 hours or 5# in one week call Goldie Omer RN so we can adjust your medications as needed over the phone.    Please feel free to call me with any questions or concerns.      Goldie Omer RN BSN [facility]  Cardiology Care Coordinator-Heart Failure Clinic    Questions and [phone].   First press #1 for the University and then press #3 for \"Medical Questions\" to reach the Cardiology Nurses.     On Call Cardiologist for after hours or on weekends: [phone]   option #4 and ask to speak to the on-call Cardiologist. Inform them you are a CORE/heart failure patient at the [facility].        If you need a medication refill please contact your pharmacy.  Please allow 3 business days for your refill to be completed.  _______________________________________________________  C.O.R.E. CLINIC Cardiomyopathy, Optimization, Rehabilitation, Education   The C.O.R.E. CLINIC is a heart failure specialty clinic within the [facility] where you will work with specialized nurse practitioners dedicated to helping patients with heart failure carefully adjust medications, receive education, and learn who and when to call if symptoms develop. They specialize in helping you better understand your condition, slow the progression of your disease, improve the length and quality of your life, help you detect future heart problems before they become life threatening, and avoid hospitalizations.  As always, thank you for trusting us with your " health care needs!                  Follow-ups after your visit        Follow-up notes from your care team     Return in about 2 weeks (around 8/16/2017).      Your next 10 appointments already scheduled     Aug 18, 2017  8:30 AM CDT   (Arrive by 8:15 AM)   Return Visit with Darvin Tang MD   [facility] ([facility])    [address] [place] [address]  3rd [facility] [address]   [phone]            Aug 29, 2017  4:00 PM CDT   Lab with  LAB   [facility] ([facility])    [address] [place] [id number]   [phone]            Aug 29, 2017  4:30 PM CDT   (Arrive by 4:15 PM)   CORE RETURN with VERONICA Hoffman CNP   [facility] ([facility])    [address] [place] [id number] [phone]              Future tests that were ordered for you today     Open Future Orders        Priority Expected Expires Ordered    Basic metabolic panel Routine 8/2/2017 8/3/2017 8/2/2017            Who to contact     If you have questions or need follow up information about today's clinic visit or your schedule please contact [facility] directly at [phone].  Normal or non-critical lab and imaging results will be communicated to you by Cursa.mehart, letter or phone within 4 business days after the clinic has received the results. If you do not hear from us within 7 days, please contact the clinic through [email or web address] or phone. If you have a critical or abnormal lab result, we will notify you by phone as soon as possible.  Submit refill requests through Ziploop or call your pharmacy and they will forward the refill request to us. Please allow 3 business days for your refill to be completed.          Additional Information About Your Visit        Ziploop Information     Ziploop gives you secure access to your electronic health record. If you see a primary care provider, you can  "also send messages to your care team and make appointments. If you have questions, please call your primary care clinic.  If you do not have a primary care provider, please call [phone] and they will assist you.        Care EveryWhere ID     This is your Care EveryWhere ID. This could be used by other organizations to access your [facility] medical records  [id number]        Your Vitals Were     Pulse Height Pulse Oximetry BMI (Body Mass Index)          82 1.727 m (5' 8\") 99% 29.04 kg/m2         Blood Pressure from Last 3 Encounters:   08/02/17 137/77   07/18/17 114/69   07/06/17 136/73    Weight from Last 3 Encounters:   08/02/17 86.6 kg (191 lb)   07/18/17 86.5 kg (190 lb 9.6 oz)   07/06/17 84.6 kg (186 lb 8 oz)                 Today's Medication Changes          These changes are accurate as of: 8/2/17  4:11 PM.  If you have any questions, ask your nurse or doctor.               These medicines have changed or have updated prescriptions.        Dose/Directions    allopurinol 300 MG tablet   Commonly known as:  ZYLOPRIM   This may have changed:  Another medication with the same name was removed. Continue taking this medication, and follow the directions you see here.   Used for:  Chronic gout of wrist, unspecified cause, unspecified laterality   Changed by:  Darvin Tang MD        Dose:  300 mg   Take 1 tablet (300 mg) by mouth daily   Quantity:  30 tablet   Refills:  2                Primary Care Provider Office Phone # Fax #    Yahir Alex Turcios [phone]       [place] [address]  [facility] [address]        Equal Access to Services     [facility] AH: Hadii marsha wang hadashsona Sotracey, waaxda luqadaha, qaybta kaalmada jose crow. So [facility] [phone].    ATENCIÓN: Si habla español, tiene a ortiz disposición servicios gratuitos de asistencia lingüística. Llame al [phone].    We comply with applicable federal civil rights laws and " [place] laws. We do not discriminate on the basis of race, color, national origin, age, disability sex, sexual orientation or gender identity.            Thank you!     Thank you for choosing [facility]  for your care. Our goal is always to provide you with excellent care. Hearing back from our patients is one way we can continue to improve our services. Please take a few minutes to complete the written survey that you may receive in the mail after your visit with us. Thank you!             Your Updated Medication List - Protect others around you: Learn how to safely use, store and throw away your medicines at www.disposemymeds.org.          This list is accurate as of: 8/2/17  4:11 PM.  Always use your most recent med list.                   Brand Name Dispense Instructions for use Diagnosis    albuterol 108 (90 BASE) MCG/ACT Inhaler    PROAIR HFA/PROVENTIL HFA/VENTOLIN HFA    1 Inhaler    Inhale 2 puffs into the lungs every 6 hours as needed for shortness of breath / dyspnea or wheezing    Cough       allopurinol 300 MG tablet    ZYLOPRIM    30 tablet    Take 1 tablet (300 mg) by mouth daily    Chronic gout of wrist, unspecified cause, unspecified laterality       amLODIPine 5 MG tablet    NORVASC    30 tablet    Take 1 tablet (5 mg) by mouth daily    Hypertension goal BP (blood pressure) < 130/80       aspirin 81 MG tablet      Take 1 tablet (81 mg) by mouth at bedtime        atorvastatin 40 MG tablet    LIPITOR    90 tablet    Take 1 tablet (40 mg) by mouth daily    CKD (chronic kidney disease) stage 3, GFR 30-59 ml/min, Hyperlipidemia LDL goal <100       bumetanide 2 MG tablet    BUMEX    180 tablet    Take 3 tablets (6 mg) by mouth 2 times daily    Systolic congestive heart failure, unspecified congestive heart failure chronicity (H)       calcitRIOL 0.25 MCG capsule    ROCALTROL    90 capsule    Take 1 capsule (0.25 mcg) by mouth daily    Hypocalcemia       ferrous sulfate 325 (65 FE) MG tablet     IRON    200 tablet    Take 1 tablet (325 mg) by mouth    Anemia in stage 5 chronic kidney disease (H), CKD (chronic kidney disease) stage 5, GFR less than 15 ml/min (H)       glipiZIDE 5 MG tablet    GLUCOTROL    90 tablet    Take 1 tablet by mouth. TAKE 1 TABLET BY MOUTH DAILY BEFORE A MEAL    Type 2 diabetes mellitus with other specified complication (H)       hydrALAZINE 50 MG tablet    APRESOLINE    270 tablet    Take 1 tablet (50 mg) by mouth 3 times daily    Type 2 diabetes mellitus with stage 5 chronic kidney disease not on chronic dialysis, without long-term current use of insulin (H)       isosorbide mononitrate 60 MG 24 hr tablet    IMDUR    90 tablet    Take 1 tablet (60 mg) by mouth daily    Chronic systolic congestive heart failure (H)       loratadine 10 MG tablet    CLARITIN     Take 10 mg by mouth daily as needed Reported on 5/3/2017    Hypertensive cardiopathy, SOB (shortness of breath)       losartan 100 MG tablet    COZAAR    90 tablet    TAKE 1 TABLET(100 MG) BY MOUTH DAILY    Renal hypertension, stage 1-4 or unspecified chronic kidney disease       metolazone 2.5 MG tablet    ZAROXOLYN    90 tablet    Take 1 tablet (2.5 mg) by mouth every other day    Other hypervolemia       metoprolol 100 MG 24 hr tablet    TOPROL XL    90 tablet    Take 1 tablet (100 mg) by mouth daily    Chronic systolic congestive heart failure (H)       omeprazole 20 MG CR capsule    priLOSEC     Take 20 mg by mouth daily At night        potassium chloride SA 20 MEQ CR tablet    K-DUR/KLOR-CON M    90 tablet    Take 40mEq in the morning (2 tabs) and 20mEq (1 tab) in the evening for a total of 60mEq daily.    Combined systolic and diastolic congestive heart failure, unspecified congestive heart failure chronicity (H), Hypervolemia, unspecified hypervolemia type, Hospital discharge follow-up       predniSONE 10 MG tablet    DELTASONE    90 tablet    Take 1 tablet (10 mg) by mouth daily    Gout, unspecified cause, unspecified  chronicity, unspecified site       sodium bicarbonate 650 MG tablet     180 tablet    TAKE 2 TABLETS(1300 MG) BY MOUTH THREE TIMES DAILY    Chronic kidney disease, unspecified
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
## 2017-08-02 NOTE — NURSING NOTE
Chief Complaint   Patient presents with     Follow Up For     62 year old return CORE; decompensated chronic systolic heart failure secondary to ischemic cardiomyopathy and possible consequence of chronic volume challenges      Vitals were taken and medications were reconciled.     Morgan Madsen, JUDY  1:20 PM
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
## 2017-08-02 NOTE — PATIENT INSTRUCTIONS
You were seen today in the Cardiovascular Clinic at the [facility].     Cardiology Providers you saw during your visit: Kristi Ayon NP     1. There are no medication changes at this time.    2. Please make a follow-up CORE/heart failure appt in 2 weeks with labs prior.     Results for SANCHEZ MCNEIL (MRN [id number]) as of 8/2/2017 13:42   Ref. Range 8/2/2017 13:11   Sodium Latest Ref Range: 133 - 144 mmol/L 139   Potassium Latest Ref Range: 3.4 - 5.3 mmol/L 4.1   Chloride Latest Ref Range: 94 - 109 mmol/L 102   Carbon Dioxide Latest Ref Range: 20 - 32 mmol/L 22   Urea Nitrogen Latest Ref Range: 7 - 30 mg/dL 137 (H)   Creatinine Latest Ref Range: 0.66 - 1.25 mg/dL 6.74 (H)   GFR Estimate Latest Ref Range: >60 mL/min/1.7m2 8 (L)   GFR Estimate If Black Latest Ref Range: >60 mL/min/1.7m2 10 (L)   Calcium Latest Ref Range: 8.5 - 10.1 mg/dL 8.8   Anion Gap Latest Ref Range: 3 - 14 mmol/L 14   Phosphorus Latest Ref Range: 2.5 - 4.5 mg/dL 4.8 (H)   Albumin Latest Ref Range: 3.4 - 5.0 g/dL 3.2 (L)   Uric Acid Latest Ref Range: 3.5 - 7.2 mg/dL 7.7 (H)   Glucose Latest Ref Range: 70 - 99 mg/dL 139 (H)   WBC Latest Ref Range: 4.0 - 11.0 10e9/L 7.1   Hemoglobin Latest Ref Range: 13.3 - 17.7 g/dL 10.4 (L)   Hematocrit Latest Ref Range: 40.0 - 53.0 % 31.6 (L)   Platelet Count Latest Ref Range: 150 - 450 10e9/L 221   RBC Count Latest Ref Range: 4.4 - 5.9 10e12/L 3.45 (L)   MCV Latest Ref Range: 78 - 100 fl 92   MCH Latest Ref Range: 26.5 - 33.0 pg 30.1   MCHC Latest Ref Range: 31.5 - 36.5 g/dL 32.9   RDW Latest Ref Range: 10.0 - 15.0 % 16.2 (H)       Please limit your fluid intake to 2 L (64 ounces) daily.  2 Liters a day = 8.5 cups, or 72 ounces.  Please limit your salt intake to 2 grams a day or less.    If you gain 2# in 24 hours or 5# in one week call Goldie Omer RN so we can adjust your medications as needed over the phone.    Please feel free to call me with any questions or concerns.      Goldie Omer RN  "[facility]  Cardiology Care Coordinator-Heart Failure Clinic    Questions and [phone].   First press #1 for the University and then press #3 for \"Medical Questions\" to reach the Cardiology Nurses.     On Call Cardiologist for after hours or on weekends: [phone]   option #4 and ask to speak to the on-call Cardiologist. Inform them you are a CORE/heart failure patient at the [place].        If you need a medication refill please contact your pharmacy.  Please allow 3 business days for your refill to be completed.  _______________________________________________________  [facility]IN[facility] Cardiomyopathy, Optimization, Rehabilitation, Education   The [facility] CLINIC is a heart failure specialty clinic within the [facility] where you will work with specialized nurse practitioners dedicated to helping patients with heart failure carefully adjust medications, receive education, and learn who and when to call if symptoms develop. They specialize in helping you better understand your condition, slow the progression of your disease, improve the length and quality of your life, help you detect future heart problems before they become life threatening, and avoid hospitalizations.  As always, thank you for trusting us with your health care needs!          "

## 2017-08-08 RX ORDER — POTASSIUM CHLORIDE 1500 MG/1
TABLET, EXTENDED RELEASE ORAL
Qty: 270 TABLET | Refills: 1 | Status: SHIPPED | OUTPATIENT
Start: 2017-08-08 | End: 2017-12-22

## 2017-08-17 DIAGNOSIS — R06.01 ORTHOPNEA: Primary | ICD-10-CM

## 2017-08-18 ENCOUNTER — OFFICE VISIT (OUTPATIENT)
Dept: RHEUMATOLOGY | Facility: CLINIC | Age: 62
End: 2017-08-18
Attending: INTERNAL MEDICINE
Payer: COMMERCIAL

## 2017-08-18 VITALS
TEMPERATURE: 97.8 F | SYSTOLIC BLOOD PRESSURE: 132 MMHG | WEIGHT: 197 LBS | BODY MASS INDEX: 29.18 KG/M2 | DIASTOLIC BLOOD PRESSURE: 74 MMHG | OXYGEN SATURATION: 99 % | HEART RATE: 98 BPM | HEIGHT: 69 IN

## 2017-08-18 DIAGNOSIS — M10.9 GOUT, UNSPECIFIED CAUSE, UNSPECIFIED CHRONICITY, UNSPECIFIED SITE: ICD-10-CM

## 2017-08-18 DIAGNOSIS — M1A.0390 CHRONIC GOUT OF WRIST, UNSPECIFIED CAUSE, UNSPECIFIED LATERALITY: ICD-10-CM

## 2017-08-18 PROCEDURE — 99212 OFFICE O/P EST SF 10 MIN: CPT | Mod: ZF

## 2017-08-18 RX ORDER — PREDNISONE 5 MG/1
5 TABLET ORAL DAILY
Qty: 90 TABLET | Refills: 1 | Status: SHIPPED | OUTPATIENT
Start: 2017-08-18 | End: 2017-12-15

## 2017-08-18 RX ORDER — ALLOPURINOL 300 MG/1
300 TABLET ORAL DAILY
Qty: 30 TABLET | Refills: 2 | Status: SHIPPED | OUTPATIENT
Start: 2017-08-18 | End: 2017-12-04

## 2017-08-18 RX ORDER — ALLOPURINOL 100 MG/1
100 TABLET ORAL DAILY
Qty: 30 TABLET | Refills: 2 | Status: SHIPPED | OUTPATIENT
Start: 2017-08-18 | End: 2017-12-04

## 2017-08-18 NOTE — MR AVS SNAPSHOT
After Visit Summary   8/18/2017    Murray Nicholson    MRN: [id number]           Patient Information     Date Of Birth          1955        Visit Information        Provider Department      8/18/2017 8:30 AM Darvin Tang MD [facility]        Today's Diagnoses     Chronic gout of wrist, unspecified cause, unspecified laterality        Gout, unspecified cause, unspecified chronicity, unspecified site          Care Instructions    Increase allopurinol to 350 mg daily for 2 weeks, then 400 mg daily.    Decrease prednisone to 5 mg / day. Still take 40 mg /day for 3-5 days for flares of gout.    Check labs in 1 month.          Follow-ups after your visit        Follow-up notes from your care team     Return in about 3 months (around 11/18/2017).      Your next 10 appointments already scheduled     Aug 29, 2017  4:00 PM CDT   Lab with  LAB   [facility] ([facility])    [address] [place] [id number] [phone]            Aug 29, 2017  4:30 PM CDT   (Arrive by 4:15 PM)   CORE RETURN with VERONICA Hoffman [facility] ([facility])    [address] [place] [address] [id number] [phone]            Dec 15, 2017  9:30 AM CST   (Arrive by 9:15 AM)   Return Visit with Darvin Tang MD   [facility] Rhe[facility] ([facility])    [address] [place] [phone] [phone]              Who to contact     If you have questions or need follow up information about today's clinic visit or your schedule please contact [facility] RHEUMATOLOGY directly at [phone].  Normal or non-critical lab and imaging results will be communicated to you by MyChart, letter or phone within 4 business days after the clinic has received the results. If you do not hear from us within 7 days, please contact the clinic through MyChart or phone. If you have a  "critical or abnormal lab result, we will notify you by phone as soon as possible.  Submit refill requests through [employer] or call your pharmacy and they will forward the refill request to us. Please allow 3 business days for your refill to be completed.          Additional Information About Your Visit        [employer] Information     [employer] gives you secure access to your electronic health record. If you see a primary care provider, you can also send messages to your care team and make appointments. If you have questions, please call your primary care clinic.  If you do not have a primary care provider, please call [phone] and they will assist you.        Care EveryWhere ID     This is your Care EveryWhere ID. This could be used by other organizations to access your [facility] medical records  [id number]        Your Vitals Were     Pulse Temperature Height Pulse Oximetry BMI (Body Mass Index)       98 97.8  F (36.6  C) (Oral) 1.753 m (5' 9\") 99% 29.09 kg/m2        Blood Pressure from Last 3 Encounters:   08/18/17 132/74   08/02/17 137/77   07/18/17 114/69    Weight from Last 3 Encounters:   08/18/17 89.4 kg (197 lb)   08/02/17 86.6 kg (191 lb)   07/18/17 86.5 kg (190 lb 9.6 oz)                 Today's Medication Changes          These changes are accurate as of: 8/18/17 11:59 PM.  If you have any questions, ask your nurse or doctor.               These medicines have changed or have updated prescriptions.        Dose/Directions    * allopurinol 300 MG tablet   Commonly known as:  ZYLOPRIM   This may have changed:  additional instructions   Used for:  Chronic gout of wrist, unspecified cause, unspecified laterality, Gout, unspecified cause, unspecified chronicity, unspecified site        Dose:  300 mg   Take 1 tablet (300 mg) by mouth daily Take with 100 mg tabs for 400 mg /day total.   Quantity:  30 tablet   Refills:  2       * allopurinol 100 MG tablet   Commonly known as:  ZYLOPRIM   This may have changed:  " You were already taking a medication with the same name, and this prescription was added. Make sure you understand how and when to take each.   Used for:  Chronic gout of wrist, unspecified cause, unspecified laterality, Gout, unspecified cause, unspecified chronicity, unspecified site        Dose:  100 mg   Take 1 tablet (100 mg) by mouth daily Take with 300 mg tabs for 400 mg /day total.   Quantity:  30 tablet   Refills:  2       predniSONE 5 MG tablet   Commonly known as:  DELTASONE   This may have changed:    - medication strength  - how much to take   Used for:  Gout, unspecified cause, unspecified chronicity, unspecified site, Chronic gout of wrist, unspecified cause, unspecified laterality        Dose:  5 mg   Take 1 tablet (5 mg) by mouth daily   Quantity:  90 tablet   Refills:  1       * Notice:  This list has 2 medication(s) that are the same as other medications prescribed for you. Read the directions carefully, and ask your doctor or other care provider to review them with you.         Where to get your medicines      These medications were sent to [id number] - [place], [place] - [address], [place] [address]     Phone:  [phone]     allopurinol 100 MG tablet    allopurinol 300 MG tablet    predniSONE 5 MG tablet                Primary Care Provider Office Phone # Fax #    Yahir Turcios [phone] [address]  [facility] [address]        Equal Access to Services     [facility] : Hadii marsha ku hadasho Sobriandaali, waaxda luqadaha, qaybta kaalmada adeegyada, jose ayala. So [facility] [phone].    ATENCIÓN: Si habla coreyañol, tiene a ortiz disposición servicios gratuitos de asistencia lingüística. Keely rodriguez [phone].    We comply with applicable federal civil rights laws and [place] laws. We do not discriminate on the basis of race, color, national origin, age, disability sex,  sexual orientation or gender identity.            Thank you!     Thank you for choosing [facility] RHEUMATOLOGY  for your care. Our goal is always to provide you with excellent care. Hearing back from our patients is one way we can continue to improve our services. Please take a few minutes to complete the written survey that you may receive in the mail after your visit with us. Thank you!             Your Updated Medication List - Protect others around you: Learn how to safely use, store and throw away your medicines at www.disposemymeds.org.          This list is accurate as of: 8/18/17 11:59 PM.  Always use your most recent med list.                   Brand Name Dispense Instructions for use Diagnosis    albuterol 108 (90 BASE) MCG/ACT Inhaler    PROAIR HFA/PROVENTIL HFA/VENTOLIN HFA    1 Inhaler    Inhale 2 puffs into the lungs every 6 hours as needed for shortness of breath / dyspnea or wheezing    Cough       * allopurinol 300 MG tablet    ZYLOPRIM    30 tablet    Take 1 tablet (300 mg) by mouth daily Take with 100 mg tabs for 400 mg /day total.    Chronic gout of wrist, unspecified cause, unspecified laterality, Gout, unspecified cause, unspecified chronicity, unspecified site       * allopurinol 100 MG tablet    ZYLOPRIM    30 tablet    Take 1 tablet (100 mg) by mouth daily Take with 300 mg tabs for 400 mg /day total.    Chronic gout of wrist, unspecified cause, unspecified laterality, Gout, unspecified cause, unspecified chronicity, unspecified site       amLODIPine 5 MG tablet    NORVASC    30 tablet    Take 1 tablet (5 mg) by mouth daily    Hypertension goal BP (blood pressure) < 130/80       aspirin 81 MG tablet      Take 1 tablet (81 mg) by mouth at bedtime        atorvastatin 40 MG tablet    LIPITOR    90 tablet    Take 1 tablet (40 mg) by mouth daily    CKD (chronic kidney disease) stage 3, GFR 30-59 ml/min, Hyperlipidemia LDL goal <100       bumetanide 2 MG tablet    BUMEX    180 tablet    Take 3  tablets (6 mg) by mouth 2 times daily    Systolic congestive heart failure, unspecified congestive heart failure chronicity (H)       calcitRIOL 0.25 MCG capsule    ROCALTROL    90 capsule    Take 1 capsule (0.25 mcg) by mouth daily    Hypocalcemia       ferrous sulfate 325 (65 FE) MG tablet    IRON    200 tablet    Take 1 tablet (325 mg) by mouth    Anemia in stage 5 chronic kidney disease (H), CKD (chronic kidney disease) stage 5, GFR less than 15 ml/min (H)       glipiZIDE 5 MG tablet    GLUCOTROL    90 tablet    Take 1 tablet by mouth. TAKE 1 TABLET BY MOUTH DAILY BEFORE A MEAL    Type 2 diabetes mellitus with other specified complication (H)       hydrALAZINE 50 MG tablet    APRESOLINE    270 tablet    Take 1 tablet (50 mg) by mouth 3 times daily    Type 2 diabetes mellitus with stage 5 chronic kidney disease not on chronic dialysis, without long-term current use of insulin (H)       isosorbide mononitrate 60 MG 24 hr tablet    IMDUR    90 tablet    Take 1 tablet (60 mg) by mouth daily    Chronic systolic congestive heart failure (H)       loratadine 10 MG tablet    CLARITIN     Take 10 mg by mouth daily as needed Reported on 5/3/2017    Hypertensive cardiopathy, SOB (shortness of breath)       losartan 100 MG tablet    COZAAR    90 tablet    TAKE 1 TABLET(100 MG) BY MOUTH DAILY    Renal hypertension, stage 1-4 or unspecified chronic kidney disease       metolazone 2.5 MG tablet    ZAROXOLYN    90 tablet    Take 1 tablet (2.5 mg) by mouth every other day    Other hypervolemia       metoprolol 100 MG 24 hr tablet    TOPROL XL    90 tablet    Take 1 tablet (100 mg) by mouth daily    Chronic systolic congestive heart failure (H)       omeprazole 20 MG CR capsule    priLOSEC     Take 20 mg by mouth daily At night        potassium chloride SA 20 MEQ CR tablet    K-DUR/KLOR-CON M    270 tablet    TAKE 2 TABLETS BY MOUTH EVERY MORNING AND 1 TABLET BY MOUTH IN THE EVENING    Combined systolic and diastolic congestive  heart failure, unspecified congestive heart failure chronicity (H), Hypervolemia, unspecified hypervolemia type, Hospital discharge follow-up       predniSONE 5 MG tablet    DELTASONE    90 tablet    Take 1 tablet (5 mg) by mouth daily    Gout, unspecified cause, unspecified chronicity, unspecified site, Chronic gout of wrist, unspecified cause, unspecified laterality       sodium bicarbonate 650 MG tablet     180 tablet    TAKE 2 TABLETS(1300 MG) BY MOUTH THREE TIMES DAILY    Chronic kidney disease, unspecified       * Notice:  This list has 2 medication(s) that are the same as other medications prescribed for you. Read the directions carefully, and ask your doctor or other care provider to review them with you.

## 2017-08-18 NOTE — LETTER
8/18/2017      RE: Murray Nicholson  [address]  [place] [address]       Rheumatology Clinic Visit     Murray Nicholson MRN# [id number]   YOB: 1955 Age: 62 year old     Date of Visit: 08/18/2017  Primary care provider: Yahir Turcios          Assessment and Plan:   #Polyarticular gout, recurrent and involving the hips with dual-energy CT showing MSU deposition bilaterally  #Hyperuricemia  #ESRD on peritoneal dialysis  #DM II     -increase allopurinol to 350 mg daily for 2 weeks, then 400 mg daily  -labs in 4 weeks, will call to titrate allopurinol further if labs OK, uric acid goal is < 6.0  -could potentially need alternative therapy (febuxostat) if unable to get uric acid to goal with allopurinol given ESRD  -decrease prednisone to 5 mg daily for flare prophylaxis  -prednisone 40 mg daily for 3-5 days for gout flare    Seen and discussed with Dr. Giles Tang MD  Rheumatology Fellow  [phone]    Attending Note: I saw and evaluated the patient with Dr. Tang. I agree with the assessment and plan. We asked nephrology about safety of allopurinol in setting of PD and were told that it would be ok to continue.    Lamberto Skaggs MD          Active Problem List:     Patient Active Problem List    Diagnosis Date Noted     Chronic systolic heart failure (H) 07/25/2017     Priority: Medium     Fluid overload 04/08/2017     Priority: Medium     Anemia in stage 5 chronic kidney disease (H) 03/17/2017     Priority: Medium     Anemia, iron deficiency 02/15/2017     Priority: Medium     Type 2 diabetes mellitus with diabetic chronic kidney disease (H) 10/14/2015     Priority: Medium     Hypertension      Priority: Medium     Dyslipidemia      Priority: Medium     MGUS (monoclonal gammopathy of unknown significance)      Priority: Medium     Ascending aortic aneurysm (H)      Priority: Medium     Anemia in chronic renal disease 05/19/2015     Priority: Medium     updating diagnosis  code for icd10 cutover       CAD (coronary artery disease) 07/10/2014     Priority: Medium     Bicuspid aortic valve      Priority: Medium     Anemia of chronic disease 04/12/2013     Priority: Medium     Problem list name updated by automated process. Provider to review and confirm       Tubular adenoma 12/30/2011     Priority: Medium     Hypertensive cardiopathy 08/24/2011     Priority: Medium     Hypertension goal BP (blood pressure) < 130/80 01/25/2011     Priority: Medium     Hyperlipidemia LDL goal <100 10/31/2010     Priority: Medium     Per provider       CKD (chronic kidney disease) stage 5, GFR less than 15 ml/min (H) 02/09/2010     Priority: Medium     CHF (congestive heart failure) (H) 08/20/2008     Priority: Medium     Allergic rhinitis 04/05/2006     Priority: Medium     Problem list name updated by automated process. Provider to review       Hypersomnia with sleep apnea 08/18/2005     Priority: Medium     Problem list name updated by automated process. Provider to review       Esophageal reflux 08/12/2004     Priority: Medium          History of Present Illness:   Murray Nicholson is a 62 year old male with history of CAD and ischemic cardiomyopathy, CKD V planning on starting peritoneal dialysis next month, and DM II who presents for evaluation of gout. He was seen initially in April of 2017 while in the hospital by Dr. Gaston. He had polyarticular gout at that time, however he also had diffuse low back pain radiating into the groin and an MRI of the lumbar spine showed inflammation of the soft tissues and muscles of the right hip. Subsequent hip MRI showed cystic changes in both hips. His uric acid was checked and was 17.9. He was diagnosed with gout clinically because he declined an aspiration. He was started on allopurinol and prednisone with prompt resolution of his symptoms. His CK was normal. He also reported a 1.5-2.0 year history of intermittent episodes of podagra and acute gout of his ankles.  He has not had a kidney biopsy.     He denies pain in his hands, elbows, or shoulders. He also denies lumps, bumps, or deposits consistent with tophi. He does not have morning stiffness. Since being seen last he has been taking allopurinol 100 mg daily without trouble. He takes 10 mg of prednisone twice per day but often bumps it up on his own if he feels a flare is impending. He is still working actively at the [employer] and thus becomes quite incapacitated by gout.    Last seen: 6/2/2017    Interim history:  Mr. Nicholson is doing well. He is on allopurinol 300 mg daily. He is still experiencing gout flares, 2-3 since his last visit per his estimation, and notes he has one now. He has been using higher doses of prednisone for 3-4 days when he has one. He has also started peritoneal dialysis as of 3 weeks ago and is adjusting to it. Having some trouble having bowel movements and frequent urination while on it at night. No problems with the allopurinol including no rash.         Review of Systems (other than in HPI):   Constitutional: negative  Skin: negative  Eyes: negative  Ears/Nose/Throat: negative  Respiratory: negative  Cardiovascular: negative  Gastrointestinal: negative  Genitourinary: negative  Musculoskeletal: negative  Neurologic: negative  Psychiatric: negative  Hematologic/Lymphatic/Immunologic: negative  Endocrine: negative          Past Medical History:     Past Medical History:   Diagnosis Date     (HFpEF) heart failure with preserved ejection fraction (H)      Allergic rhinitis, cause unspecified      Anemia of chronic kidney failure      Ascending aortic aneurysm (H)      Bicuspid aortic valve      CAD (coronary artery disease)      Chronic kidney disease, stage 5 (H)      Congestive heart failure, unspecified      Dyslipidemia      Esophageal reflux      Hypersomnia with sleep apnea, unspecified      Hypertension      MGUS (monoclonal gammopathy of unknown significance)      SHEELA (obstructive  sleep apnea)      Systolic heart failure (H)      Type 2 diabetes mellitus (H)      Past Surgical History:   Procedure Laterality Date     ABDOMEN SURGERY      Hernia     LAPAROSCOPIC HERNIORRHAPHY INGUINAL BILATERAL Bilateral 2015    Procedure: LAPAROSCOPIC HERNIORRHAPHY INGUINAL BILATERAL;  Surgeon: Bobby Mcconnell MD;  Location: [facility] OR     LAPAROSCOPIC INSERTION CATHETER PERITONEAL DIALYSIS N/A 2017    Procedure: LAPAROSCOPIC INSERTION CATHETER PERITONEAL DIALYSIS;  Laparoscopic Peritoneal Dialysis Catheter Placement - Anesthesia with block;  Surgeon: Esteban Arvizu MD;  Location: [facility] OR          Social History:     Social History     Occupational History     Not on file.     Social History Main Topics     Smoking status: Former Smoker     Packs/day: 1.00     Years: 19.00     Types: Cigarettes     Quit date: 1994     Smokeless tobacco: Never Used     Alcohol use No     Drug use: No     Sexual activity: Not Currently     Partners: Female     Birth control/ protection: Condom          Family History:     Family History   Problem Relation Age of Onset     C.A.D. Father       from-never knew father-age 60     DIABETES Father      CEREBROVASCULAR DISEASE Father      Hypertension No family hx of      Breast Cancer No family hx of      Cancer - colorectal No family hx of      Prostate Cancer No family hx of           Allergies:     Allergies   Allergen Reactions     Norco [Hydrocodone-Acetaminophen] Nausea and Vomiting     Cats      Throat tightness     Penicillins Hives     Seasonal Allergies      rhinitis     Shrimp      Throat closes           Medications:     Current Outpatient Prescriptions   Medication Sig Dispense Refill     allopurinol (ZYLOPRIM) 300 MG tablet Take 1 tablet (300 mg) by mouth daily Take with 100 mg tabs for 400 mg /day total. 30 tablet 2     allopurinol (ZYLOPRIM) 100 MG tablet Take 1 tablet (100 mg) by mouth daily Take with 300 mg tabs for 400 mg /day total. 30 tablet 2      predniSONE (DELTASONE) 5 MG tablet Take 1 tablet (5 mg) by mouth daily 90 tablet 1     potassium chloride SA (K-DUR/KLOR-CON M) 20 MEQ CR tablet TAKE 2 TABLETS BY MOUTH EVERY MORNING AND 1 TABLET BY MOUTH IN THE EVENING 270 tablet 1     sodium bicarbonate 650 MG tablet TAKE 2 TABLETS(1300 MG) BY MOUTH THREE TIMES DAILY 180 tablet 0     losartan (COZAAR) 100 MG tablet TAKE 1 TABLET(100 MG) BY MOUTH DAILY 90 tablet 0     ferrous sulfate (IRON) 325 (65 FE) MG tablet Take 1 tablet (325 mg) by mouth 200 tablet 3     amLODIPine (NORVASC) 5 MG tablet Take 1 tablet (5 mg) by mouth daily 30 tablet 3     metolazone (ZAROXOLYN) 2.5 MG tablet Take 1 tablet (2.5 mg) by mouth every other day 90 tablet 1     calcitRIOL (ROCALTROL) 0.25 MCG capsule Take 1 capsule (0.25 mcg) by mouth daily 90 capsule 0     glipiZIDE (GLUCOTROL) 5 MG tablet Take 1 tablet by mouth. TAKE 1 TABLET BY MOUTH DAILY BEFORE A MEAL 90 tablet 0     atorvastatin (LIPITOR) 40 MG tablet Take 1 tablet (40 mg) by mouth daily 90 tablet 3     isosorbide mononitrate (IMDUR) 60 MG 24 hr tablet Take 1 tablet (60 mg) by mouth daily 90 tablet 3     metoprolol (TOPROL XL) 100 MG 24 hr tablet Take 1 tablet (100 mg) by mouth daily 90 tablet 3     hydrALAZINE (APRESOLINE) 50 MG tablet Take 1 tablet (50 mg) by mouth 3 times daily 270 tablet 3     omeprazole (PRILOSEC) 20 MG CR capsule Take 20 mg by mouth daily At night       albuterol (PROAIR HFA/PROVENTIL HFA/VENTOLIN HFA) 108 (90 BASE) MCG/ACT Inhaler Inhale 2 puffs into the lungs every 6 hours as needed for shortness of breath / dyspnea or wheezing 1 Inhaler 0     loratadine (CLARITIN) 10 MG tablet Take 10 mg by mouth daily as needed Reported on 5/3/2017       ASPIRIN 81 MG OR TABS Take 1 tablet (81 mg) by mouth at bedtime       bumetanide (BUMEX) 2 MG tablet Take 3 tablets (6 mg) by mouth 2 times daily 180 tablet 3          Physical Exam:   Blood pressure 132/74, pulse 98, temperature 97.8  F (36.6  C), temperature  "source Oral, height 1.753 m (5' 9\"), weight 89.4 kg (197 lb), SpO2 99 %.  Wt Readings from Last 4 Encounters:   08/18/17 89.4 kg (197 lb)   08/02/17 86.6 kg (191 lb)   07/18/17 86.5 kg (190 lb 9.6 oz)   07/06/17 84.6 kg (186 lb 8 oz)     Constitutional: well-developed, appearing stated age; cooperative  Eyes: normal EOM, PERRLA, vision, conjunctiva, sclera  ENT: normal external ears, nose, hearing, lips, teeth, gums, throat, no mucous membrane lesions, normal saliva pool  Neck: no mass or thyroid enlargement  Resp: lungs clear to auscultation  CV: regular rate and rhythm, no murmurs, rubs or gallops, no edema  GI: no abdominal mass or tenderness, no organomegaly  : not tested  Lymph: no cervical, supraclavicular, or epitrochlear nodes  MS: The TMJ, neck, shoulder, elbow, wrist, MCP/PIP/DIP, spine, hip, knee, ankle, and foot MTP/IP joints were examined. No active synovitis or altered joint anatomy. Full joint ROM. Normal  strength. No dactylitis,  tenosynovitis, enthesopathy. Bilateral 1st MTP tenderness, right > left. MTP's 3-4 tender in left foot. Left ankle tender without warmth or effusion. Right ankle with moderate effusion, some warmth, tenderness. No tophi.  Skin: no nail pitting, alopecia, rash, nodules or lesions  Neuro: normal cranial nerves, strength, sensation, DTR's  Psych: normal judgement, orientation, memory, affect.         Data:     Results for orders placed or performed in visit on 08/02/17   Renal panel   Result Value Ref Range    Sodium 139 133 - 144 mmol/L    Potassium 4.1 3.4 - 5.3 mmol/L    Chloride 102 94 - 109 mmol/L    Carbon Dioxide 22 20 - 32 mmol/L    Anion Gap 14 3 - 14 mmol/L    Glucose 139 (H) 70 - 99 mg/dL    Urea Nitrogen 137 (H) 7 - 30 mg/dL    Creatinine 6.74 (H) 0.66 - 1.25 mg/dL    GFR Estimate 8 (L) >60 mL/min/1.7m2    GFR Estimate If Black 10 (L) >60 mL/min/1.7m2    Calcium 8.8 8.5 - 10.1 mg/dL    Phosphorus 4.8 (H) 2.5 - 4.5 mg/dL    Albumin 3.2 (L) 3.4 - 5.0 g/dL   CBC " with platelets   Result Value Ref Range    WBC 7.1 4.0 - 11.0 10e9/L    RBC Count 3.45 (L) 4.4 - 5.9 10e12/L    Hemoglobin 10.4 (L) 13.3 - 17.7 g/dL    Hematocrit 31.6 (L) 40.0 - 53.0 %    MCV 92 78 - 100 fl    MCH 30.1 26.5 - 33.0 pg    MCHC 32.9 31.5 - 36.5 g/dL    RDW 16.2 (H) 10.0 - 15.0 %    Platelet Count 221 150 - 450 10e9/L   Uric acid   Result Value Ref Range    Uric Acid 7.7 (H) 3.5 - 7.2 mg/dL     Hemoglobin   Date Value Ref Range Status   08/02/2017 10.4 (L) 13.3 - 17.7 g/dL Final   07/19/2017 10.1 (L) 13.3 - 17.7 g/dL Final   07/05/2017 8.9 (L) 13.3 - 17.7 g/dL Final     Urea Nitrogen   Date Value Ref Range Status   08/02/2017 137 (H) 7 - 30 mg/dL Final   07/05/2017 130 (H) 7 - 30 mg/dL Final   06/30/2017 133 (H) 7 - 30 mg/dL Final     Creatinine   Date Value Ref Range Status   06/03/2013 2.1 (H) 0.66 - 1.25 mg/dL Final   05/17/2010 1.8 (H) 0.66 - 1.25 mg/dL Final     Comment:     New IDMS-traceable calibration  beginning 12/17/08     Sed Rate   Date Value Ref Range Status   01/13/2016 80 (H) 0 - 20 mm/h Final     CRP Inflammation   Date Value Ref Range Status   07/05/2017 35.4 (H) 0.0 - 8.0 mg/L Final   05/31/2017 15.9 (H) 0.0 - 8.0 mg/L Final   05/17/2017 39.3 (H) 0.0 - 8.0 mg/L Final     AST   Date Value Ref Range Status   07/05/2017 13 0 - 45 U/L Final   05/31/2017 18 0 - 45 U/L Final   05/17/2017 16 0 - 45 U/L Final     Albumin   Date Value Ref Range Status   08/02/2017 3.2 (L) 3.4 - 5.0 g/dL Final   07/05/2017 2.7 (L) 3.4 - 5.0 g/dL Final   06/21/2017 2.9 (L) 3.4 - 5.0 g/dL Final     Alkaline Phosphatase   Date Value Ref Range Status   07/05/2017 98 40 - 150 U/L Final   05/31/2017 97 40 - 150 U/L Final   05/17/2017 86 40 - 150 U/L Final     ALT   Date Value Ref Range Status   07/05/2017 15 0 - 70 U/L Final   05/31/2017 16 0 - 70 U/L Final   05/17/2017 12 0 - 70 U/L Final     Recent Labs   Lab Test  08/02/17   1311  07/19/17   1306  07/05/17   1204  06/30/17   0555   06/26/17   0140   05/31/17    1111  05/17/17   1214   04/12/17   0935   04/09/15   0900   05/15/13   1418   WBC  7.1   --   11.9*   --    --   8.3   < >  8.0  4.9   < >   --    < >  6.8   < >   --    HGB  10.4*  10.1*  8.9*   --    --   9.4*   < >  9.9*  9.3*   < >   --    < >  8.8*   < >   --    HCT  31.6*  30.1*  27.4*   --    --   30.2*   < >  31.1*  29.0*   < >   --    < >  26.2*   < >   --    MCV  92   --   91   --    --   94   < >  92  91   < >   --    < >  86   < >   --    PLT  221   --   267   --    --   203   < >  302  250   < >   --    < >  339   < >   --    BUN  137*   --   130*  133*   < >  116*   < >  146*  163*   < >   --    < >  54*   < >  27   TSH   --    --    --    --    --    --    --    --    --    --   1.26   --   3.47   --   2.05   AST   --    --   13   --    --    --    --   18  16   < >   --    < >  19   --   33   ALT   --    --   15   --    --    --    --   16  12   < >   --    < >  25   --   36   ALKPHOS   --    --   98   --    --    --    --   97  86   < >   --    < >  154*   --   100    < > = values in this interval not displayed.     Reviewed Rheumatology lab flowsheet    Darvin Tang MD

## 2017-08-18 NOTE — NURSING NOTE
"Chief Complaint   Patient presents with     RECHECK     Follow up        Initial /74  Pulse 98  Temp 97.8  F (36.6  C) (Oral)  Ht 1.753 m (5' 9\")  Wt 89.4 kg (197 lb)  SpO2 99%  BMI 29.09 kg/m2 Estimated body mass index is 29.09 kg/(m^2) as calculated from the following:    Height as of this encounter: 1.753 m (5' 9\").    Weight as of this encounter: 89.4 kg (197 lb).  Medication Reconciliation: complete   Kyleigh Hager., CMA    "

## 2017-08-18 NOTE — PATIENT INSTRUCTIONS
Increase allopurinol to 350 mg daily for 2 weeks, then 400 mg daily.    Decrease prednisone to 5 mg / day. Still take 40 mg /day for 3-5 days for flares of gout.    Check labs in 1 month.

## 2017-08-18 NOTE — PROGRESS NOTES
Rheumatology Clinic Visit     Murray Nicholson MRN# [id number]   YOB: 1955 Age: 62 year old     Date of Visit: 08/18/2017  Primary care provider: Yahir Turcios          Assessment and Plan:   #Polyarticular gout, recurrent and involving the hips with dual-energy CT showing MSU deposition bilaterally  #Hyperuricemia  #ESRD on peritoneal dialysis  #DM II     -increase allopurinol to 350 mg daily for 2 weeks, then 400 mg daily  -labs in 4 weeks, will call to titrate allopurinol further if labs OK, uric acid goal is < 6.0  -could potentially need alternative therapy (febuxostat) if unable to get uric acid to goal with allopurinol given ESRD  -decrease prednisone to 5 mg daily for flare prophylaxis  -prednisone 40 mg daily for 3-5 days for gout flare    Seen and discussed with Dr. Giles aTng MD  Rheumatology Fellow  [phone]    Attending Note: I saw and evaluated the patient with Dr. Tang. I agree with the assessment and plan. We asked nephrology about safety of allopurinol in setting of PD and were told that it would be ok to continue.    Lamberto Skaggs MD          Active Problem List:     Patient Active Problem List    Diagnosis Date Noted     Chronic systolic heart failure (H) 07/25/2017     Priority: Medium     Fluid overload 04/08/2017     Priority: Medium     Anemia in stage 5 chronic kidney disease (H) 03/17/2017     Priority: Medium     Anemia, iron deficiency 02/15/2017     Priority: Medium     Type 2 diabetes mellitus with diabetic chronic kidney disease (H) 10/14/2015     Priority: Medium     Hypertension      Priority: Medium     Dyslipidemia      Priority: Medium     MGUS (monoclonal gammopathy of unknown significance)      Priority: Medium     Ascending aortic aneurysm (H)      Priority: Medium     Anemia in chronic renal disease 05/19/2015     Priority: Medium     updating diagnosis code for icd10 cutover       CAD (coronary artery disease) 07/10/2014     Priority: Medium      Bicuspid aortic valve      Priority: Medium     Anemia of chronic disease 04/12/2013     Priority: Medium     Problem list name updated by automated process. Provider to review and confirm       Tubular adenoma 12/30/2011     Priority: Medium     Hypertensive cardiopathy 08/24/2011     Priority: Medium     Hypertension goal BP (blood pressure) < 130/80 01/25/2011     Priority: Medium     Hyperlipidemia LDL goal <100 10/31/2010     Priority: Medium     Per provider       CKD (chronic kidney disease) stage 5, GFR less than 15 ml/min (H) 02/09/2010     Priority: Medium     CHF (congestive heart failure) (H) 08/20/2008     Priority: Medium     Allergic rhinitis 04/05/2006     Priority: Medium     Problem list name updated by automated process. Provider to review       Hypersomnia with sleep apnea 08/18/2005     Priority: Medium     Problem list name updated by automated process. Provider to review       Esophageal reflux 08/12/2004     Priority: Medium          History of Present Illness:   Murray Nicholson is a 62 year old male with history of CAD and ischemic cardiomyopathy, CKD V planning on starting peritoneal dialysis next month, and DM II who presents for evaluation of gout. He was seen initially in April of 2017 while in the hospital by Dr. Gaston. He had polyarticular gout at that time, however he also had diffuse low back pain radiating into the groin and an MRI of the lumbar spine showed inflammation of the soft tissues and muscles of the right hip. Subsequent hip MRI showed cystic changes in both hips. His uric acid was checked and was 17.9. He was diagnosed with gout clinically because he declined an aspiration. He was started on allopurinol and prednisone with prompt resolution of his symptoms. His CK was normal. He also reported a 1.5-2.0 year history of intermittent episodes of podagra and acute gout of his ankles. He has not had a kidney biopsy.     He denies pain in his hands, elbows, or shoulders. He  also denies lumps, bumps, or deposits consistent with tophi. He does not have morning stiffness. Since being seen last he has been taking allopurinol 100 mg daily without trouble. He takes 10 mg of prednisone twice per day but often bumps it up on his own if he feels a flare is impending. He is still working actively at the [employer] and thus becomes quite incapacitated by gout.    Last seen: 6/2/2017    Interim history:  Mr. Nicholson is doing well. He is on allopurinol 300 mg daily. He is still experiencing gout flares, 2-3 since his last visit per his estimation, and notes he has one now. He has been using higher doses of prednisone for 3-4 days when he has one. He has also started peritoneal dialysis as of 3 weeks ago and is adjusting to it. Having some trouble having bowel movements and frequent urination while on it at night. No problems with the allopurinol including no rash.         Review of Systems (other than in HPI):   Constitutional: negative  Skin: negative  Eyes: negative  Ears/Nose/Throat: negative  Respiratory: negative  Cardiovascular: negative  Gastrointestinal: negative  Genitourinary: negative  Musculoskeletal: negative  Neurologic: negative  Psychiatric: negative  Hematologic/Lymphatic/Immunologic: negative  Endocrine: negative          Past Medical History:     Past Medical History:   Diagnosis Date     (HFpEF) heart failure with preserved ejection fraction (H)      Allergic rhinitis, cause unspecified      Anemia of chronic kidney failure      Ascending aortic aneurysm (H)      Bicuspid aortic valve      CAD (coronary artery disease)      Chronic kidney disease, stage 5 (H)      Congestive heart failure, unspecified      Dyslipidemia      Esophageal reflux      Hypersomnia with sleep apnea, unspecified      Hypertension      MGUS (monoclonal gammopathy of unknown significance)      SHEELA (obstructive sleep apnea)      Systolic heart failure (H)      Type 2 diabetes mellitus (H)      Past  Surgical History:   Procedure Laterality Date     ABDOMEN SURGERY      Hernia     LAPAROSCOPIC HERNIORRHAPHY INGUINAL BILATERAL Bilateral 2015    Procedure: LAPAROSCOPIC HERNIORRHAPHY INGUINAL BILATERAL;  Surgeon: Bobby Mcconnell MD;  Location: [facility] OR     LAPAROSCOPIC INSERTION CATHETER PERITONEAL DIALYSIS N/A 2017    Procedure: LAPAROSCOPIC INSERTION CATHETER PERITONEAL DIALYSIS;  Laparoscopic Peritoneal Dialysis Catheter Placement - Anesthesia with block;  Surgeon: Esteban Arvizu MD;  Location: [facility] OR          Social History:     Social History     Occupational History     Not on file.     Social History Main Topics     Smoking status: Former Smoker     Packs/day: 1.00     Years: 19.00     Types: Cigarettes     Quit date: 1994     Smokeless tobacco: Never Used     Alcohol use No     Drug use: No     Sexual activity: Not Currently     Partners: Female     Birth control/ protection: Condom          Family History:     Family History   Problem Relation Age of Onset     C.A.D. Father       from-never knew father-age 60     DIABETES Father      CEREBROVASCULAR DISEASE Father      Hypertension No family hx of      Breast Cancer No family hx of      Cancer - colorectal No family hx of      Prostate Cancer No family hx of           Allergies:     Allergies   Allergen Reactions     Norco [Hydrocodone-Acetaminophen] Nausea and Vomiting     Cats      Throat tightness     Penicillins Hives     Seasonal Allergies      rhinitis     Shrimp      Throat closes           Medications:     Current Outpatient Prescriptions   Medication Sig Dispense Refill     allopurinol (ZYLOPRIM) 300 MG tablet Take 1 tablet (300 mg) by mouth daily Take with 100 mg tabs for 400 mg /day total. 30 tablet 2     allopurinol (ZYLOPRIM) 100 MG tablet Take 1 tablet (100 mg) by mouth daily Take with 300 mg tabs for 400 mg /day total. 30 tablet 2     predniSONE (DELTASONE) 5 MG tablet Take 1 tablet (5 mg) by mouth daily 90 tablet 1      "potassium chloride SA (K-DUR/KLOR-CON M) 20 MEQ CR tablet TAKE 2 TABLETS BY MOUTH EVERY MORNING AND 1 TABLET BY MOUTH IN THE EVENING 270 tablet 1     sodium bicarbonate 650 MG tablet TAKE 2 TABLETS(1300 MG) BY MOUTH THREE TIMES DAILY 180 tablet 0     losartan (COZAAR) 100 MG tablet TAKE 1 TABLET(100 MG) BY MOUTH DAILY 90 tablet 0     ferrous sulfate (IRON) 325 (65 FE) MG tablet Take 1 tablet (325 mg) by mouth 200 tablet 3     amLODIPine (NORVASC) 5 MG tablet Take 1 tablet (5 mg) by mouth daily 30 tablet 3     metolazone (ZAROXOLYN) 2.5 MG tablet Take 1 tablet (2.5 mg) by mouth every other day 90 tablet 1     calcitRIOL (ROCALTROL) 0.25 MCG capsule Take 1 capsule (0.25 mcg) by mouth daily 90 capsule 0     glipiZIDE (GLUCOTROL) 5 MG tablet Take 1 tablet by mouth. TAKE 1 TABLET BY MOUTH DAILY BEFORE A MEAL 90 tablet 0     atorvastatin (LIPITOR) 40 MG tablet Take 1 tablet (40 mg) by mouth daily 90 tablet 3     isosorbide mononitrate (IMDUR) 60 MG 24 hr tablet Take 1 tablet (60 mg) by mouth daily 90 tablet 3     metoprolol (TOPROL XL) 100 MG 24 hr tablet Take 1 tablet (100 mg) by mouth daily 90 tablet 3     hydrALAZINE (APRESOLINE) 50 MG tablet Take 1 tablet (50 mg) by mouth 3 times daily 270 tablet 3     omeprazole (PRILOSEC) 20 MG CR capsule Take 20 mg by mouth daily At night       albuterol (PROAIR HFA/PROVENTIL HFA/VENTOLIN HFA) 108 (90 BASE) MCG/ACT Inhaler Inhale 2 puffs into the lungs every 6 hours as needed for shortness of breath / dyspnea or wheezing 1 Inhaler 0     loratadine (CLARITIN) 10 MG tablet Take 10 mg by mouth daily as needed Reported on 5/3/2017       ASPIRIN 81 MG OR TABS Take 1 tablet (81 mg) by mouth at bedtime       bumetanide (BUMEX) 2 MG tablet Take 3 tablets (6 mg) by mouth 2 times daily 180 tablet 3          Physical Exam:   Blood pressure 132/74, pulse 98, temperature 97.8  F (36.6  C), temperature source Oral, height 1.753 m (5' 9\"), weight 89.4 kg (197 lb), SpO2 99 %.  Wt Readings from " Last 4 Encounters:   08/18/17 89.4 kg (197 lb)   08/02/17 86.6 kg (191 lb)   07/18/17 86.5 kg (190 lb 9.6 oz)   07/06/17 84.6 kg (186 lb 8 oz)     Constitutional: well-developed, appearing stated age; cooperative  Eyes: normal EOM, PERRLA, vision, conjunctiva, sclera  ENT: normal external ears, nose, hearing, lips, teeth, gums, throat, no mucous membrane lesions, normal saliva pool  Neck: no mass or thyroid enlargement  Resp: lungs clear to auscultation  CV: regular rate and rhythm, no murmurs, rubs or gallops, no edema  GI: no abdominal mass or tenderness, no organomegaly  : not tested  Lymph: no cervical, supraclavicular, or epitrochlear nodes  MS: The TMJ, neck, shoulder, elbow, wrist, MCP/PIP/DIP, spine, hip, knee, ankle, and foot MTP/IP joints were examined. No active synovitis or altered joint anatomy. Full joint ROM. Normal  strength. No dactylitis,  tenosynovitis, enthesopathy. Bilateral 1st MTP tenderness, right > left. MTP's 3-4 tender in left foot. Left ankle tender without warmth or effusion. Right ankle with moderate effusion, some warmth, tenderness. No tophi.  Skin: no nail pitting, alopecia, rash, nodules or lesions  Neuro: normal cranial nerves, strength, sensation, DTR's  Psych: normal judgement, orientation, memory, affect.         Data:     Results for orders placed or performed in visit on 08/02/17   Renal panel   Result Value Ref Range    Sodium 139 133 - 144 mmol/L    Potassium 4.1 3.4 - 5.3 mmol/L    Chloride 102 94 - 109 mmol/L    Carbon Dioxide 22 20 - 32 mmol/L    Anion Gap 14 3 - 14 mmol/L    Glucose 139 (H) 70 - 99 mg/dL    Urea Nitrogen 137 (H) 7 - 30 mg/dL    Creatinine 6.74 (H) 0.66 - 1.25 mg/dL    GFR Estimate 8 (L) >60 mL/min/1.7m2    GFR Estimate If Black 10 (L) >60 mL/min/1.7m2    Calcium 8.8 8.5 - 10.1 mg/dL    Phosphorus 4.8 (H) 2.5 - 4.5 mg/dL    Albumin 3.2 (L) 3.4 - 5.0 g/dL   CBC with platelets   Result Value Ref Range    WBC 7.1 4.0 - 11.0 10e9/L    RBC Count 3.45 (L)  4.4 - 5.9 10e12/L    Hemoglobin 10.4 (L) 13.3 - 17.7 g/dL    Hematocrit 31.6 (L) 40.0 - 53.0 %    MCV 92 78 - 100 fl    MCH 30.1 26.5 - 33.0 pg    MCHC 32.9 31.5 - 36.5 g/dL    RDW 16.2 (H) 10.0 - 15.0 %    Platelet Count 221 150 - 450 10e9/L   Uric acid   Result Value Ref Range    Uric Acid 7.7 (H) 3.5 - 7.2 mg/dL     Hemoglobin   Date Value Ref Range Status   08/02/2017 10.4 (L) 13.3 - 17.7 g/dL Final   07/19/2017 10.1 (L) 13.3 - 17.7 g/dL Final   07/05/2017 8.9 (L) 13.3 - 17.7 g/dL Final     Urea Nitrogen   Date Value Ref Range Status   08/02/2017 137 (H) 7 - 30 mg/dL Final   07/05/2017 130 (H) 7 - 30 mg/dL Final   06/30/2017 133 (H) 7 - 30 mg/dL Final     Creatinine   Date Value Ref Range Status   06/03/2013 2.1 (H) 0.66 - 1.25 mg/dL Final   05/17/2010 1.8 (H) 0.66 - 1.25 mg/dL Final     Comment:     New IDMS-traceable calibration  beginning 12/17/08     Sed Rate   Date Value Ref Range Status   01/13/2016 80 (H) 0 - 20 mm/h Final     CRP Inflammation   Date Value Ref Range Status   07/05/2017 35.4 (H) 0.0 - 8.0 mg/L Final   05/31/2017 15.9 (H) 0.0 - 8.0 mg/L Final   05/17/2017 39.3 (H) 0.0 - 8.0 mg/L Final     AST   Date Value Ref Range Status   07/05/2017 13 0 - 45 U/L Final   05/31/2017 18 0 - 45 U/L Final   05/17/2017 16 0 - 45 U/L Final     Albumin   Date Value Ref Range Status   08/02/2017 3.2 (L) 3.4 - 5.0 g/dL Final   07/05/2017 2.7 (L) 3.4 - 5.0 g/dL Final   06/21/2017 2.9 (L) 3.4 - 5.0 g/dL Final     Alkaline Phosphatase   Date Value Ref Range Status   07/05/2017 98 40 - 150 U/L Final   05/31/2017 97 40 - 150 U/L Final   05/17/2017 86 40 - 150 U/L Final     ALT   Date Value Ref Range Status   07/05/2017 15 0 - 70 U/L Final   05/31/2017 16 0 - 70 U/L Final   05/17/2017 12 0 - 70 U/L Final     Recent Labs   Lab Test  08/02/17   1311  07/19/17   1306  07/05/17   1204  06/30/17   0555   06/26/17   0140   05/31/17   1111  05/17/17   1214   04/12/17   0935   04/09/15   0900   05/15/13   1418   WBC  7.1   --    11.9*   --    --   8.3   < >  8.0  4.9   < >   --    < >  6.8   < >   --    HGB  10.4*  10.1*  8.9*   --    --   9.4*   < >  9.9*  9.3*   < >   --    < >  8.8*   < >   --    HCT  31.6*  30.1*  27.4*   --    --   30.2*   < >  31.1*  29.0*   < >   --    < >  26.2*   < >   --    MCV  92   --   91   --    --   94   < >  92  91   < >   --    < >  86   < >   --    PLT  221   --   267   --    --   203   < >  302  250   < >   --    < >  339   < >   --    BUN  137*   --   130*  133*   < >  116*   < >  146*  163*   < >   --    < >  54*   < >  27   TSH   --    --    --    --    --    --    --    --    --    --   1.26   --   3.47   --   2.05   AST   --    --   13   --    --    --    --   18  16   < >   --    < >  19   --   33   ALT   --    --   15   --    --    --    --   16  12   < >   --    < >  25   --   36   ALKPHOS   --    --   98   --    --    --    --   97  86   < >   --    < >  154*   --   100    < > = values in this interval not displayed.     Reviewed Rheumatology lab flowsheet

## 2017-08-20 ENCOUNTER — PRE VISIT (OUTPATIENT)
Dept: CARDIOLOGY | Facility: CLINIC | Age: 62
End: 2017-08-20

## 2017-08-20 DIAGNOSIS — I50.22 CHRONIC SYSTOLIC HEART FAILURE (H): Primary | ICD-10-CM

## 2017-08-22 DIAGNOSIS — E11.69 TYPE 2 DIABETES MELLITUS WITH OTHER SPECIFIED COMPLICATION (H): ICD-10-CM

## 2017-08-22 NOTE — TELEPHONE ENCOUNTER
glipiZIDE (GLUCOTROL) 5 MG tablet   Last Written Prescription Date: 05/08/2017  Last Fill Quantity: 90, # refills: 0  Last Office Visit with [facility], [facility] or [facility] prescribing provider:  07/18/2017        BP Readings from Last 3 Encounters:   08/18/17 132/74   08/02/17 137/77   07/18/17 114/69     Lab Results   Component Value Date    MICROL 1160 05/19/2015     Lab Results   Component Value Date    UMALCR 1266.38 05/19/2015     Creatinine   Date Value Ref Range Status   08/02/2017 6.74 (H) 0.66 - 1.25 mg/dL Final   ]  GFR Estimate   Date Value Ref Range Status   08/02/2017 8 (L) >60 mL/min/1.7m2 Final     Comment:     Non  GFR Calc   07/05/2017 9 (L) >60 mL/min/1.7m2 Final     Comment:     Non  GFR Calc   06/30/2017 10 (L) >60 mL/min/1.7m2 Final     Comment:     Non  GFR Calc     GFR Estimate If Black   Date Value Ref Range Status   08/02/2017 10 (L) >60 mL/min/1.7m2 Final     Comment:      GFR Calc   07/05/2017 11 (L) >60 mL/min/1.7m2 Final     Comment:      GFR Calc   06/30/2017 12 (L) >60 mL/min/1.7m2 Final     Comment:      GFR Calc     Lab Results   Component Value Date    CHOL 101 04/11/2017     Lab Results   Component Value Date    HDL 34 04/11/2017     Lab Results   Component Value Date    LDL 38 04/11/2017     Lab Results   Component Value Date    TRIG 145 04/11/2017     Lab Results   Component Value Date    CHOLHDLRATIO 5.0 08/10/2015     Lab Results   Component Value Date    AST 13 07/05/2017     Lab Results   Component Value Date    ALT 15 07/05/2017     Lab Results   Component Value Date    A1C 6.4 06/27/2017    A1C 7.2 04/09/2017    A1C 6.3 11/08/2016    A1C 6.4 04/14/2016    A1C 5.4 08/10/2015     Potassium   Date Value Ref Range Status   08/02/2017 4.1 3.4 - 5.3 mmol/L Final

## 2017-08-25 RX ORDER — GLIPIZIDE 5 MG/1
TABLET ORAL
Qty: 90 TABLET | Refills: 0 | Status: ON HOLD | OUTPATIENT
Start: 2017-08-25 | End: 2018-01-09

## 2017-09-20 DIAGNOSIS — N18.6 END STAGE RENAL DISEASE (H): Primary | ICD-10-CM

## 2017-10-06 RX ORDER — SODIUM BICARBONATE 650 MG/1
TABLET ORAL
Qty: 180 TABLET | Refills: 0 | Status: SHIPPED | OUTPATIENT
Start: 2017-10-06 | End: 2017-11-06

## 2017-10-06 NOTE — TELEPHONE ENCOUNTER
Medication Detail      Disp Refills Start End JOJO   sodium bicarbonate 650 MG tablet 180 tablet 0 8/1/2017  No   Sig: TAKE 2 TABLETS(1300 MG) BY MOUTH THREE TIMES DAILY       Last Office Visit with [facility], [facility] or [facility] prescribing provider: 7-18-17  Future Office visit:       Routing refill request to provider for review/approval because:  Drug not on the [facility], [facility] or [facility] refill protocol or controlled substance

## 2017-10-28 DIAGNOSIS — I10 HYPERTENSION GOAL BP (BLOOD PRESSURE) < 130/80: ICD-10-CM

## 2017-10-30 ENCOUNTER — TELEPHONE (OUTPATIENT)
Dept: TRANSPLANT | Facility: CLINIC | Age: 62
End: 2017-10-30

## 2017-10-30 NOTE — LETTER
Murray Nicholson  [address]  [place] [address]      Dear Mr. Nicholson,    The transplant team at the [facility] has been trying to reach you to see if you are still interested in transplant.  If you are still interested in staying on our kidney wait list, please call me within the next 30 days at [phone].      If you do not wish to remain in our transplant program at this time, please let us know. If we do not hear from you by 11/30/17 your case will be reviewed for delisting.       Sincerely,        Britni Hatch, RN, BSN   Kidney Transplant Wait List Coordinator  [phone] Phone  [phone] Fax  [email or web address]     Solid Organ Transplant  [facility] [address] [address]      CC:  [facility] ([facility]), Brice Caraballo MD

## 2017-10-30 NOTE — TELEPHONE ENCOUNTER
Coordinator left pt msg asking if he is still interested in kidney transplant. If pt is still interested there are a number of items for him to complete: 1. Cardiology (pt no showed for f/u appoint last August. Appears he needs angiogram). 2. Nephrology, 3. SW, 4. Dental, 5. Colonoscopy, 6. Labs, 7.imaging, 8. PFTs, 9. Potentially oncology f/u, 10. Wondering what pt's current prednisone dose is. Requested pt call back w/i the next 30 days if he's still interested in transplant and make these appointments. Contact number provided. Letter routed to admins to send to pt.

## 2017-10-31 RX ORDER — AMLODIPINE BESYLATE 5 MG/1
TABLET ORAL
Qty: 30 TABLET | Refills: 0 | OUTPATIENT
Start: 2017-10-31

## 2017-11-04 DIAGNOSIS — I50.20 SYSTOLIC CONGESTIVE HEART FAILURE, UNSPECIFIED CONGESTIVE HEART FAILURE CHRONICITY: ICD-10-CM

## 2017-11-06 DIAGNOSIS — I10 HYPERTENSION GOAL BP (BLOOD PRESSURE) < 130/80: ICD-10-CM

## 2017-11-06 DIAGNOSIS — N18.6 END STAGE RENAL DISEASE (H): ICD-10-CM

## 2017-11-06 RX ORDER — AMLODIPINE BESYLATE 5 MG/1
5 TABLET ORAL DAILY
Qty: 90 TABLET | Refills: 0 | Status: SHIPPED | OUTPATIENT
Start: 2017-11-06 | End: 2017-12-06

## 2017-11-06 RX ORDER — BUMETANIDE 2 MG/1
6 TABLET ORAL
Qty: 180 TABLET | Refills: 3 | Status: ON HOLD | OUTPATIENT
Start: 2017-11-06 | End: 2018-01-09

## 2017-11-06 RX ORDER — SODIUM BICARBONATE 650 MG/1
TABLET ORAL
Qty: 180 TABLET | Refills: 0 | Status: SHIPPED | OUTPATIENT
Start: 2017-11-06 | End: 2017-11-16

## 2017-11-06 NOTE — TELEPHONE ENCOUNTER
BP Readings from Last 1 Encounters:   08/18/17 132/74     Last Basic Metabolic Panel:  Lab Results   Component Value Date     08/02/2017      Lab Results   Component Value Date    POTASSIUM 4.1 08/02/2017     Lab Results   Component Value Date    CHLORIDE 102 08/02/2017     Lab Results   Component Value Date    TRINI 8.8 08/02/2017     Lab Results   Component Value Date    CO2 22 08/02/2017     Lab Results   Component Value Date     08/02/2017     Lab Results   Component Value Date    CR 6.74 08/02/2017     Lab Results   Component Value Date     08/02/2017

## 2017-11-06 NOTE — TELEPHONE ENCOUNTER
DL review:  SODIUM BICARBONATE NOT ON THE PROTOCOLS FOR RN.  Seen in July by you.  Last filled 10/6/17 # 180.  Abnormal CMP July.     Routing refill request to provider for review/approval because:  Drug not on the [facility] refill protocol

## 2017-11-06 NOTE — TELEPHONE ENCOUNTER
Creatinine   Date Value Ref Range Status   08/02/2017 6.74 (H) 0.66 - 1.25 mg/dL Final   ]  Potassium   Date Value Ref Range Status   08/02/2017 4.1 3.4 - 5.3 mmol/L Final   ]  BP Readings from Last 1 Encounters:   08/18/17 132/74     Defer to pcp cr abnormal  Thanks!     Jacquelin Ash RN

## 2017-11-13 ENCOUNTER — HOSPITAL ENCOUNTER (OUTPATIENT)
Facility: CLINIC | Age: 62
Setting detail: OBSERVATION
Discharge: HOME | End: 2017-11-15
Attending: EMERGENCY MEDICINE | Admitting: INTERNAL MEDICINE
Payer: COMMERCIAL

## 2017-11-13 ENCOUNTER — APPOINTMENT (OUTPATIENT)
Dept: GENERAL RADIOLOGY | Facility: CLINIC | Age: 62
End: 2017-11-13
Attending: EMERGENCY MEDICINE
Payer: COMMERCIAL

## 2017-11-13 DIAGNOSIS — E87.79 OTHER HYPERVOLEMIA: ICD-10-CM

## 2017-11-13 DIAGNOSIS — I50.23 ACUTE ON CHRONIC SYSTOLIC CONGESTIVE HEART FAILURE (H): ICD-10-CM

## 2017-11-13 PROBLEM — E87.70 VOLUME OVERLOAD: Status: ACTIVE | Noted: 2017-11-13

## 2017-11-13 LAB
ALBUMIN SERPL-MCNC: 2.3 G/DL (ref 3.4–5)
ALP SERPL-CCNC: 104 U/L (ref 40–150)
ALT SERPL W P-5'-P-CCNC: 26 U/L (ref 0–70)
ANION GAP SERPL CALCULATED.3IONS-SCNC: 9 MMOL/L (ref 3–14)
AST SERPL W P-5'-P-CCNC: 22 U/L (ref 0–45)
BASOPHILS # BLD AUTO: 0 10E9/L (ref 0–0.2)
BASOPHILS NFR BLD AUTO: 0.1 %
BILIRUB SERPL-MCNC: 0.8 MG/DL (ref 0.2–1.3)
BUN SERPL-MCNC: 81 MG/DL (ref 7–30)
CALCIUM SERPL-MCNC: 8.3 MG/DL (ref 8.5–10.1)
CHLORIDE SERPL-SCNC: 106 MMOL/L (ref 94–109)
CO2 BLDCOV-SCNC: 27 MMOL/L (ref 21–28)
CO2 SERPL-SCNC: 28 MMOL/L (ref 20–32)
CREAT SERPL-MCNC: 7.11 MG/DL (ref 0.66–1.25)
DIFFERENTIAL METHOD BLD: ABNORMAL
EOSINOPHIL # BLD AUTO: 0.1 10E9/L (ref 0–0.7)
EOSINOPHIL NFR BLD AUTO: 0.8 %
ERYTHROCYTE [DISTWIDTH] IN BLOOD BY AUTOMATED COUNT: 17.7 % (ref 10–15)
GFR SERPL CREATININE-BSD FRML MDRD: 8 ML/MIN/1.7M2
GLUCOSE SERPL-MCNC: 231 MG/DL (ref 70–99)
HCT VFR BLD AUTO: 28.9 % (ref 40–53)
HGB BLD-MCNC: 8.9 G/DL (ref 13.3–17.7)
IMM GRANULOCYTES # BLD: 0.1 10E9/L (ref 0–0.4)
IMM GRANULOCYTES NFR BLD: 0.7 %
LACTATE BLD-SCNC: 1 MMOL/L (ref 0.7–2.1)
LYMPHOCYTES # BLD AUTO: 0.5 10E9/L (ref 0.8–5.3)
LYMPHOCYTES NFR BLD AUTO: 4.8 %
MCH RBC QN AUTO: 31.1 PG (ref 26.5–33)
MCHC RBC AUTO-ENTMCNC: 30.8 G/DL (ref 31.5–36.5)
MCV RBC AUTO: 101 FL (ref 78–100)
MONOCYTES # BLD AUTO: 0.5 10E9/L (ref 0–1.3)
MONOCYTES NFR BLD AUTO: 5.5 %
NEUTROPHILS # BLD AUTO: 8.4 10E9/L (ref 1.6–8.3)
NEUTROPHILS NFR BLD AUTO: 88.1 %
NRBC # BLD AUTO: 0 10*3/UL
NRBC BLD AUTO-RTO: 0 /100
NT-PROBNP SERPL-MCNC: ABNORMAL PG/ML (ref 0–900)
PCO2 BLDV: 33 MM HG (ref 40–50)
PH BLDV: 7.52 PH (ref 7.32–7.43)
PLATELET # BLD AUTO: 277 10E9/L (ref 150–450)
PO2 BLDV: 44 MM HG (ref 25–47)
POTASSIUM SERPL-SCNC: 4.4 MMOL/L (ref 3.4–5.3)
PROT SERPL-MCNC: 6 G/DL (ref 6.8–8.8)
RBC # BLD AUTO: 2.86 10E12/L (ref 4.4–5.9)
SAO2 % BLDV FROM PO2: 85 %
SODIUM SERPL-SCNC: 142 MMOL/L (ref 133–144)
TROPONIN I SERPL-MCNC: 0.17 UG/L (ref 0–0.04)
TROPONIN I SERPL-MCNC: 0.19 UG/L (ref 0–0.04)
WBC # BLD AUTO: 9.6 10E9/L (ref 4–11)

## 2017-11-13 PROCEDURE — 36415 COLL VENOUS BLD VENIPUNCTURE: CPT | Performed by: INTERNAL MEDICINE

## 2017-11-13 PROCEDURE — 85025 COMPLETE CBC W/AUTO DIFF WBC: CPT | Performed by: EMERGENCY MEDICINE

## 2017-11-13 PROCEDURE — 84484 ASSAY OF TROPONIN QUANT: CPT | Performed by: INTERNAL MEDICINE

## 2017-11-13 PROCEDURE — 84484 ASSAY OF TROPONIN QUANT: CPT | Performed by: EMERGENCY MEDICINE

## 2017-11-13 PROCEDURE — 99220 ZZC INITIAL OBSERVATION CARE,LEVL III: CPT | Mod: AI | Performed by: INTERNAL MEDICINE

## 2017-11-13 PROCEDURE — 93010 ELECTROCARDIOGRAM REPORT: CPT | Mod: Z6 | Performed by: EMERGENCY MEDICINE

## 2017-11-13 PROCEDURE — 27210995 ZZH RX 272: Performed by: INTERNAL MEDICINE

## 2017-11-13 PROCEDURE — 83880 ASSAY OF NATRIURETIC PEPTIDE: CPT | Performed by: EMERGENCY MEDICINE

## 2017-11-13 PROCEDURE — S0171 BUMETANIDE 0.5 MG: HCPCS | Performed by: EMERGENCY MEDICINE

## 2017-11-13 PROCEDURE — 74020 XR ABDOMEN 2 VW: CPT

## 2017-11-13 PROCEDURE — 96374 THER/PROPH/DIAG INJ IV PUSH: CPT | Performed by: EMERGENCY MEDICINE

## 2017-11-13 PROCEDURE — 82803 BLOOD GASES ANY COMBINATION: CPT

## 2017-11-13 PROCEDURE — 84484 ASSAY OF TROPONIN QUANT: CPT | Mod: 91 | Performed by: STUDENT IN AN ORGANIZED HEALTH CARE EDUCATION/TRAINING PROGRAM

## 2017-11-13 PROCEDURE — 99285 EMERGENCY DEPT VISIT HI MDM: CPT | Mod: 25 | Performed by: EMERGENCY MEDICINE

## 2017-11-13 PROCEDURE — 93308 TTE F-UP OR LMTD: CPT | Performed by: EMERGENCY MEDICINE

## 2017-11-13 PROCEDURE — 87340 HEPATITIS B SURFACE AG IA: CPT | Performed by: INTERNAL MEDICINE

## 2017-11-13 PROCEDURE — 71020 XR CHEST 2 VW: CPT

## 2017-11-13 PROCEDURE — 25000125 ZZHC RX 250: Performed by: EMERGENCY MEDICINE

## 2017-11-13 PROCEDURE — 83605 ASSAY OF LACTIC ACID: CPT

## 2017-11-13 PROCEDURE — 93005 ELECTROCARDIOGRAM TRACING: CPT | Performed by: EMERGENCY MEDICINE

## 2017-11-13 PROCEDURE — 40000095 ZZH STATISTIC LEVEL 2 EST PATIENT

## 2017-11-13 PROCEDURE — 80053 COMPREHEN METABOLIC PANEL: CPT | Performed by: EMERGENCY MEDICINE

## 2017-11-13 PROCEDURE — 86706 HEP B SURFACE ANTIBODY: CPT | Performed by: INTERNAL MEDICINE

## 2017-11-13 PROCEDURE — G0378 HOSPITAL OBSERVATION PER HR: HCPCS

## 2017-11-13 RX ORDER — ACETAMINOPHEN 650 MG/1
650 SUPPOSITORY RECTAL EVERY 4 HOURS PRN
Status: DISCONTINUED | OUTPATIENT
Start: 2017-11-13 | End: 2017-11-15 | Stop reason: HOSPADM

## 2017-11-13 RX ORDER — BUMETANIDE 0.25 MG/ML
1 INJECTION INTRAMUSCULAR; INTRAVENOUS ONCE
Status: COMPLETED | OUTPATIENT
Start: 2017-11-13 | End: 2017-11-13

## 2017-11-13 RX ORDER — SODIUM BICARBONATE 650 MG/1
650 TABLET ORAL 3 TIMES DAILY
Status: DISCONTINUED | OUTPATIENT
Start: 2017-11-14 | End: 2017-11-15 | Stop reason: HOSPADM

## 2017-11-13 RX ORDER — DEXTROSE MONOHYDRATE 25 G/50ML
25-50 INJECTION, SOLUTION INTRAVENOUS
Status: DISCONTINUED | OUTPATIENT
Start: 2017-11-13 | End: 2017-11-15 | Stop reason: HOSPADM

## 2017-11-13 RX ORDER — ISOSORBIDE MONONITRATE 60 MG/1
60 TABLET, EXTENDED RELEASE ORAL DAILY
Status: DISCONTINUED | OUTPATIENT
Start: 2017-11-14 | End: 2017-11-15 | Stop reason: HOSPADM

## 2017-11-13 RX ORDER — NALOXONE HYDROCHLORIDE 0.4 MG/ML
.1-.4 INJECTION, SOLUTION INTRAMUSCULAR; INTRAVENOUS; SUBCUTANEOUS
Status: DISCONTINUED | OUTPATIENT
Start: 2017-11-13 | End: 2017-11-15 | Stop reason: HOSPADM

## 2017-11-13 RX ORDER — LOSARTAN POTASSIUM 100 MG/1
100 TABLET ORAL DAILY
Status: DISCONTINUED | OUTPATIENT
Start: 2017-11-14 | End: 2017-11-15 | Stop reason: HOSPADM

## 2017-11-13 RX ORDER — ASPIRIN 81 MG/1
81 TABLET ORAL AT BEDTIME
Status: DISCONTINUED | OUTPATIENT
Start: 2017-11-14 | End: 2017-11-15 | Stop reason: HOSPADM

## 2017-11-13 RX ORDER — ATORVASTATIN CALCIUM 40 MG/1
40 TABLET, FILM COATED ORAL DAILY
Status: DISCONTINUED | OUTPATIENT
Start: 2017-11-14 | End: 2017-11-15 | Stop reason: HOSPADM

## 2017-11-13 RX ORDER — METOPROLOL SUCCINATE 25 MG/1
50 TABLET, EXTENDED RELEASE ORAL DAILY
Status: DISCONTINUED | OUTPATIENT
Start: 2017-11-14 | End: 2017-11-15 | Stop reason: HOSPADM

## 2017-11-13 RX ORDER — CALCITRIOL 0.25 UG/1
0.25 CAPSULE, LIQUID FILLED ORAL DAILY
Status: DISCONTINUED | OUTPATIENT
Start: 2017-11-14 | End: 2017-11-15 | Stop reason: HOSPADM

## 2017-11-13 RX ORDER — PREDNISONE 5 MG/1
5 TABLET ORAL DAILY
Status: DISCONTINUED | OUTPATIENT
Start: 2017-11-14 | End: 2017-11-15 | Stop reason: HOSPADM

## 2017-11-13 RX ORDER — METOPROLOL SUCCINATE 100 MG/1
100 TABLET, EXTENDED RELEASE ORAL DAILY
Status: DISCONTINUED | OUTPATIENT
Start: 2017-11-14 | End: 2017-11-13

## 2017-11-13 RX ORDER — GENTAMICIN SULFATE 1 MG/G
CREAM TOPICAL DAILY
Status: DISCONTINUED | OUTPATIENT
Start: 2017-11-14 | End: 2017-11-15 | Stop reason: HOSPADM

## 2017-11-13 RX ORDER — ONDANSETRON 4 MG/1
4 TABLET, ORALLY DISINTEGRATING ORAL EVERY 6 HOURS PRN
Status: DISCONTINUED | OUTPATIENT
Start: 2017-11-13 | End: 2017-11-15 | Stop reason: HOSPADM

## 2017-11-13 RX ORDER — ONDANSETRON 2 MG/ML
4 INJECTION INTRAMUSCULAR; INTRAVENOUS EVERY 6 HOURS PRN
Status: DISCONTINUED | OUTPATIENT
Start: 2017-11-13 | End: 2017-11-15 | Stop reason: HOSPADM

## 2017-11-13 RX ORDER — BUMETANIDE 2 MG/1
6 TABLET ORAL 3 TIMES DAILY
Status: DISCONTINUED | OUTPATIENT
Start: 2017-11-14 | End: 2017-11-15 | Stop reason: HOSPADM

## 2017-11-13 RX ORDER — ACETAMINOPHEN 325 MG/1
650 TABLET ORAL EVERY 4 HOURS PRN
Status: DISCONTINUED | OUTPATIENT
Start: 2017-11-13 | End: 2017-11-15 | Stop reason: HOSPADM

## 2017-11-13 RX ORDER — ALBUTEROL SULFATE 90 UG/1
2 AEROSOL, METERED RESPIRATORY (INHALATION) EVERY 6 HOURS PRN
Status: DISCONTINUED | OUTPATIENT
Start: 2017-11-13 | End: 2017-11-15 | Stop reason: HOSPADM

## 2017-11-13 RX ORDER — NICOTINE POLACRILEX 4 MG
15-30 LOZENGE BUCCAL
Status: DISCONTINUED | OUTPATIENT
Start: 2017-11-13 | End: 2017-11-15 | Stop reason: HOSPADM

## 2017-11-13 RX ORDER — AMLODIPINE BESYLATE 2.5 MG/1
5 TABLET ORAL DAILY
Status: DISCONTINUED | OUTPATIENT
Start: 2017-11-14 | End: 2017-11-15 | Stop reason: HOSPADM

## 2017-11-13 RX ORDER — ALLOPURINOL 100 MG/1
400 TABLET ORAL DAILY
Status: DISCONTINUED | OUTPATIENT
Start: 2017-11-14 | End: 2017-11-15 | Stop reason: HOSPADM

## 2017-11-13 RX ORDER — METOLAZONE 2.5 MG/1
2.5 TABLET ORAL EVERY OTHER DAY
Status: DISCONTINUED | OUTPATIENT
Start: 2017-11-14 | End: 2017-11-15 | Stop reason: HOSPADM

## 2017-11-13 RX ORDER — ASPIRIN 81 MG/1
81 TABLET ORAL DAILY
Status: DISCONTINUED | OUTPATIENT
Start: 2017-11-14 | End: 2017-11-13

## 2017-11-13 RX ORDER — HYDRALAZINE HYDROCHLORIDE 25 MG/1
50 TABLET, FILM COATED ORAL 3 TIMES DAILY
Status: DISCONTINUED | OUTPATIENT
Start: 2017-11-13 | End: 2017-11-15 | Stop reason: HOSPADM

## 2017-11-13 RX ORDER — BENZONATATE 100 MG/1
100 CAPSULE ORAL 3 TIMES DAILY PRN
Status: DISCONTINUED | OUTPATIENT
Start: 2017-11-13 | End: 2017-11-15 | Stop reason: HOSPADM

## 2017-11-13 RX ADMIN — SODIUM CHLORIDE, SODIUM LACTATE, CALCIUM CHLORIDE, MAGNESIUM CHLORIDE AND DEXTROSE: 4.25; 538; 448; 18.3; 5.08 INJECTION, SOLUTION INTRAPERITONEAL at 23:51

## 2017-11-13 RX ADMIN — SODIUM CHLORIDE, SODIUM LACTATE, CALCIUM CHLORIDE, MAGNESIUM CHLORIDE AND DEXTROSE: 4.25; 538; 448; 18.3; 5.08 INJECTION, SOLUTION INTRAPERITONEAL at 23:49

## 2017-11-13 RX ADMIN — BUMETANIDE 1 MG: 0.25 INJECTION INTRAMUSCULAR; INTRAVENOUS at 20:14

## 2017-11-13 RX ADMIN — SODIUM CHLORIDE, SODIUM LACTATE, CALCIUM CHLORIDE, MAGNESIUM CHLORIDE AND DEXTROSE: 4.25; 538; 448; 18.3; 5.08 INJECTION, SOLUTION INTRAPERITONEAL at 23:50

## 2017-11-13 RX ADMIN — SODIUM CHLORIDE, SODIUM LACTATE, CALCIUM CHLORIDE, MAGNESIUM CHLORIDE AND DEXTROSE: 4.25; 538; 448; 18.3; 5.08 INJECTION, SOLUTION INTRAPERITONEAL at 23:48

## 2017-11-13 RX ADMIN — SODIUM CHLORIDE, SODIUM LACTATE, CALCIUM CHLORIDE, MAGNESIUM CHLORIDE AND DEXTROSE: 4.25; 538; 448; 18.3; 5.08 INJECTION, SOLUTION INTRAPERITONEAL at 23:52

## 2017-11-13 ASSESSMENT — ENCOUNTER SYMPTOMS
COUGH: 1
PALPITATIONS: 0
SHORTNESS OF BREATH: 1

## 2017-11-13 NOTE — ED PROVIDER NOTES
[facility] ([facility])  11/13/17 ED   History     Chief Complaint   Patient presents with     Shortness of Breath     Pt on peritoneal dialysis. Pt reports increasing SOA past few days, sitting up to sleep, coughing a lot. Pt unable to get strongest dialysate due to lack of supplies. Concern for increasing fluid on lungs.      HPI  Murray Nicholson is a 62 year old male with a history of type II diabetes, hypertension, CHF, and chronic kidney disease who presents to the ED with complaints of shortness of breath. Patient is on peritoneal dialysis and notes he has been to complete his full runs overnight. Patient states that he was unable to get strongest dialysate due to lack of supplies for the last week. Patient states that he was cycling yesterday when he began to experience shortness of breath during the last few seconds of first cycle. Patient ceased his activity. He called [facility] this morning and was recommended to come to the ED. Patient endorses leg swelling and dry cough but denies any asscoiated symptoms of chest pain, palpations. Denies fevers, abdominal pain. Patient states that he usually can sleep laying flat but over the last week has been sleeping in a sitting up position. Patient notes that he has also gained ~18 lbs over the last 2 weeks. Currently on Bumex but was previously on furosemide. Denies any recent medication changes. Patient is not currently on any blood thinners. Denies history of PE/DVT.     Patient was hospitalized earlier this year on 6/26-30 after presenting with right sided chest pain and mild heart failure exacerbation and was diuresed with lasix gtt (10mg/hr) then transitioned to oral bumex, 6mg twice daily. PD catheter was placed and patient was continued on daily ASA, Toprol, hydralazine, losartan Statin, Bumex 6mg BID with metolazone every other day, bicarb, and calcitriol.     I have reviewed the Medications, Allergies, Past Medical and Surgical  History, and Social History in the [facility] system.    Past Medical History:   Diagnosis Date     (HFpEF) heart failure with preserved ejection fraction (H)      Allergic rhinitis, cause unspecified      Anemia of chronic kidney failure      Ascending aortic aneurysm (H)      Bicuspid aortic valve      CAD (coronary artery disease)      Chronic kidney disease, stage 5 (H)      Congestive heart failure, unspecified      Dyslipidemia      Esophageal reflux      Hypersomnia with sleep apnea, unspecified      Hypertension      MGUS (monoclonal gammopathy of unknown significance)      SHEELA (obstructive sleep apnea)      Systolic heart failure (H)      Type 2 diabetes mellitus (H)        Past Surgical History:   Procedure Laterality Date     ABDOMEN SURGERY      Hernia     LAPAROSCOPIC HERNIORRHAPHY INGUINAL BILATERAL Bilateral 2015    Procedure: LAPAROSCOPIC HERNIORRHAPHY INGUINAL BILATERAL;  Surgeon: Bobby Mcconnell MD;  Location: [facility] OR     LAPAROSCOPIC INSERTION CATHETER PERITONEAL DIALYSIS N/A 2017    Procedure: LAPAROSCOPIC INSERTION CATHETER PERITONEAL DIALYSIS;  Laparoscopic Peritoneal Dialysis Catheter Placement - Anesthesia with block;  Surgeon: Esteban Arvizu MD;  Location: [facility] OR       Family History   Problem Relation Age of Onset     C.A.D. Father       from-never knew father-age 60     DIABETES Father      CEREBROVASCULAR DISEASE Father      Hypertension No family hx of      Breast Cancer No family hx of      Cancer - colorectal No family hx of      Prostate Cancer No family hx of        Social History   Substance Use Topics     Smoking status: Former Smoker     Packs/day: 1.00     Years: 19.00     Types: Cigarettes     Quit date: 1994     Smokeless tobacco: Never Used     Alcohol use No       No current facility-administered medications for this encounter.      Current Outpatient Prescriptions   Medication     bumetanide (BUMEX) 2 MG tablet     amLODIPine (NORVASC) 5 MG tablet     sodium  "bicarbonate 650 MG tablet     glipiZIDE (GLUCOTROL) 5 MG tablet     allopurinol (ZYLOPRIM) 300 MG tablet     allopurinol (ZYLOPRIM) 100 MG tablet     predniSONE (DELTASONE) 5 MG tablet     potassium chloride SA (K-DUR/KLOR-CON M) 20 MEQ CR tablet     losartan (COZAAR) 100 MG tablet     ferrous sulfate (IRON) 325 (65 FE) MG tablet     metolazone (ZAROXOLYN) 2.5 MG tablet     calcitRIOL (ROCALTROL) 0.25 MCG capsule     atorvastatin (LIPITOR) 40 MG tablet     isosorbide mononitrate (IMDUR) 60 MG 24 hr tablet     metoprolol (TOPROL XL) 100 MG 24 hr tablet     hydrALAZINE (APRESOLINE) 50 MG tablet     omeprazole (PRILOSEC) 20 MG CR capsule     albuterol (PROAIR HFA/PROVENTIL HFA/VENTOLIN HFA) 108 (90 BASE) MCG/ACT Inhaler     loratadine (CLARITIN) 10 MG tablet     ASPIRIN 81 MG OR TABS     Facility-Administered Medications Ordered in Other Encounters   Medication     bupivacaine 0.25 % - EPINEPHrine 1:200,000 injection          Allergies   Allergen Reactions     Norco [Hydrocodone-Acetaminophen] Nausea and Vomiting     Cats      Throat tightness     Penicillins Hives     Seasonal Allergies      rhinitis     Shrimp      Throat closes        Review of Systems   Respiratory: Positive for cough (dry) and shortness of breath.    Cardiovascular: Positive for leg swelling. Negative for chest pain and palpitations.   All other systems reviewed and are negative.      Physical Exam   BP: 125/67  Pulse: 101  Temp: 98.1  F (36.7  C)  Resp: 24  Height: 175.3 cm (5' 9\")  Weight: 94.4 kg (208 lb 3.2 oz)  SpO2: 97 %      Physical Exam   General: patient is alert and oriented and in no acute distress   Head: atraumatic and normocephalic   EENT: moist mucus membranes, pupils round and reactive   Neck: supple   Cardiovascular: regular rate and rhythm, extremities warm and well perfused, 2+ bilateral lower extremity edema  Pulmonary: bilateral crackles at bases   Abdomen: soft, non-tender   Musculoskeletal: normal range of motion "   Neurological: alert and oriented, moving all extremities symmetrically, gait normal   Skin: warm, dry       ED Course     ED Course     Procedures             EKG Interpretation:      Interpreted by Kristal Ocampo  Time reviewed: 1830  Symptoms at time of EKG: SOB   Rhythm: normal sinus   Rate: Normal  Axis: Left Axis Deviation  Ectopy: none  Conduction: normal  ST Segments/ T Waves: No acute ischemic changes, LVH  Q Waves: nonspecific  Comparison to prior: Unchanged    Clinical Impression: no acute changes                  Critical Care time:  none           Labs Ordered and Resulted from Time of ED Arrival Up to the Time of Departure from the ED   CBC WITH PLATELETS DIFFERENTIAL - Abnormal; Notable for the following:        Result Value    RBC Count 2.86 (*)     Hemoglobin 8.9 (*)     Hematocrit 28.9 (*)      (*)     MCHC 30.8 (*)     RDW 17.7 (*)     Absolute Neutrophil 8.4 (*)     Absolute Lymphocytes 0.5 (*)     All other components within normal limits   ISTAT  GASES LACTATE BOOM POCT - Abnormal; Notable for the following:     Ph Venous 7.52 (*)     PCO2 Venous 33 (*)     All other components within normal limits   COMPREHENSIVE METABOLIC PANEL   PERIPHERAL IV CATHETER   PULSE OXIMETRY NURSING   CARDIAC CONTINUOUS MONITORING   NURSING DRAW AND HOLD   ISTAT CG4 GASES LACTATE BOOM NURSING POCT   NURSING DRAW AND HOLD            Assessments & Plan (with Medical Decision Making)   Mr. Nicholson is a 62-year-old gentleman with a history of end-stage renal disease on peritoneal dialysis, congestive heart failure, coronary artery disease, type II diabetes mellitus, and aortic aneurysm and hypertension who presents to the emergency department with shortness of breath, orthopnea, weight gain and pitting edema in his lower extremities.  His history and exam are most consistent with volume overload.  He has been unable to change his dialysate to account for his increasing volume status for the last week due to  lack of supplies.  I suspect that his volume overload is secondary to inability to adequately dialyze at home.  He denies any chest pain with these episodes.  His ECG does not show any acute ischemic changes and troponin is at baseline of 0.165 in the setting of a creatinine of 7.11.  His BNP is improved from prior at 73,000.  He does have evidence of pulmonary edema on chest x-ray as well as bedside cardiac ultrasound.  He does have diminished global function on bedside cardiac ultrasound without any evidence of pericardial effusion.  He does not have any significant electrolyte abnormalities at this time.  He was given IV Bumex in the Emergency Department as he does still make urine.  Discussed with Cardiology and will plan to admit the patient to Medicine for volume overload and further dialysis and diuresis.      This part of the document was transcribed by Елена Flaherty Medical Scribe.      I have reviewed the nursing notes.    I have reviewed the findings, diagnosis, plan and need for follow up with the patient.    Current Discharge Medication List          Final diagnoses:   Acute on chronic systolic congestive heart failure (H)   Other hypervolemia   IAmparo, am serving as a trained medical scribe to document services personally performed by Kristal Ocampo MD, based on the provider's statements to me.    I, Kristal Ocampo MD, was physically present and have reviewed and verified the accuracy of this note documented by Amparo Calle.     11/13/2017   [facility], [place], EMERGENCY DEPARTMENT     Kristal Ocampo MD  11/13/17 7311

## 2017-11-13 NOTE — IP AVS SNAPSHOT
Unit [address]D Observation [address] [address]    Phone:  [phone]    Fax:  [phone]                                       After Visit Summary   11/13/2017    Murray Nicholson    MRN: [id number]           After Visit Summary Signature Page     I have received my discharge instructions, and my questions have been answered. I have discussed any challenges I see with this plan with the nurse or doctor.    ..........................................................................................................................................  Patient/Patient Representative Signature      ..........................................................................................................................................  Patient Representative Print Name and Relationship to Patient    ..................................................               ................................................  Date                                            Time    ..........................................................................................................................................  Reviewed by Signature/Title    ...................................................              ..............................................  Date                                                            Time

## 2017-11-13 NOTE — ED NOTES
Pt on peritoneal dialysis. Pt reports increasing SOA past few days, sitting up to sleep, coughing a lot. Pt unable to get strongest dialysate due to lack of supplies. Concern for increasing fluid on lungs.

## 2017-11-13 NOTE — IP AVS SNAPSHOT
MRN:[id number]                      After Visit Summary   11/13/2017    Murray Nicholson    MRN: [id number]           Thank you!     Thank you for choosing [facility] for your care. Our goal is always to provide you with excellent care. Hearing back from our patients is one way we can continue to improve our services. Please take a few minutes to complete the written survey that you may receive in the mail after you visit with us. Thank you!        Patient Information     Date Of Birth          1955        About your hospital stay     You were admitted on:  November 13, 2017 You last received care in the:  Unit 6D Observation [facility]    You were discharged on:  November 15, 2017        Reason for your hospital stay       You were hospitalized for a CHF exacerbation due to not being able to complete your PD at home. Your symptoms improved after we were able to do a couple runs of dialysis and reduced your weight and volume.                  Who to Call     For medical emergencies, please call 911.  For non-urgent questions about your medical care, please call your primary care provider or clinic, [phone]          Attending Provider     Provider Specialty    Kristal Ocampo MD Emergency Medicine    Paul, MD Breanne Internal Medicine    Seward, Christopher Ayoub MD Internal Medicine       Primary Care Provider Office Phone # Fax #    Yahir Turcios [phone]      After Care Instructions     Activity       Your activity upon discharge: activity as tolerated            Diet       Follow this diet upon discharge: Orders Placed This Encounter      Fluid restriction 1800 ML FLUID      2 Gram Sodium Diet            Discharge Instructions       Follow up with your PCP within a week for post-hospital follow-up and get the following labs/tests: BMP.                  Follow-up Appointments     Follow Up and recommended labs and tests       Follow up with primary care provider,  "Yahir Turcios, within 7 days for hospital follow-up.  The following labs/tests are recommended: BMP.                  Your next 10 appointments already scheduled     Dec 15, 2017  9:30 AM CST   (Arrive by 9:15 AM)   Return Visit with Darvin Tang MD   [facility] ([facility])    [address]  [address] [facility] [address] [phone]              Future tests that were ordered for you     Basic metabolic panel                 Pending Results     No orders found from 11/11/2017 to 11/14/2017.            Statement of Approval     Ordered          11/15/17 1109  I have reviewed and agree with all the recommendations and orders detailed in this document.  EFFECTIVE NOW     Approved and electronically signed by:  Rosaline Mary MD             Admission Information     Date & Time Provider Department Dept. Phone    11/13/2017 Christopher Murray MD Unit 6D Observation [facility] [place] [phone]      Your Vitals Were     Blood Pressure Pulse Temperature Respirations Height Weight    132/77 (BP Location: Left arm) 98 99.4  F (37.4  C) (Oral) 16 1.753 m (5' 9\") 87.6 kg (193 lb 3.2 oz)    Pulse Oximetry BMI (Body Mass Index)                100% 28.53 kg/m2          MyChart Information     [employer] gives you secure access to your electronic health record. If you see a primary care provider, you can also send messages to your care team and make appointments. If you have questions, please call your primary care clinic.  If you do not have a primary care provider, please call [phone] and they will assist you.        Care EveryWhere ID     This is your Care EveryWhere ID. This could be used by other organizations to access your [facility] medical records  [id number]        Equal Access to Services     JOHN HAUSER : jose Bear, jose fields. So [facility] [phone].    ATENCIÓN: " Si habla shane, tiene a ortiz disposición servicios gratuitos de asistencia lingüística. Keely rodriguez [phone].    We comply with applicable federal civil rights laws and [place] laws. We do not discriminate on the basis of race, color, national origin, age, disability, sex, sexual orientation, or gender identity.               Review of your medicines      CONTINUE these medicines which have NOT CHANGED        Dose / Directions    albuterol 108 (90 BASE) MCG/ACT Inhaler   Commonly known as:  PROAIR HFA/PROVENTIL HFA/VENTOLIN HFA   Used for:  Cough        Dose:  2 puff   Inhale 2 puffs into the lungs every 6 hours as needed for shortness of breath / dyspnea or wheezing   Quantity:  1 Inhaler   Refills:  0       * allopurinol 300 MG tablet   Commonly known as:  ZYLOPRIM   Used for:  Chronic gout of wrist, unspecified cause, unspecified laterality, Gout, unspecified cause, unspecified chronicity, unspecified site        Dose:  300 mg   Take 1 tablet (300 mg) by mouth daily Take with 100 mg tabs for 400 mg /day total.   Quantity:  30 tablet   Refills:  2       * allopurinol 100 MG tablet   Commonly known as:  ZYLOPRIM   Used for:  Chronic gout of wrist, unspecified cause, unspecified laterality, Gout, unspecified cause, unspecified chronicity, unspecified site        Dose:  100 mg   Take 1 tablet (100 mg) by mouth daily Take with 300 mg tabs for 400 mg /day total.   Quantity:  30 tablet   Refills:  2       amLODIPine 5 MG tablet   Commonly known as:  NORVASC   Used for:  Hypertension goal BP (blood pressure) < 130/80        Dose:  5 mg   Take 1 tablet (5 mg) by mouth daily   Quantity:  90 tablet   Refills:  0       aspirin 81 MG tablet        Take 1 tablet (81 mg) by mouth at bedtime   Refills:  0       atorvastatin 40 MG tablet   Commonly known as:  LIPITOR   Used for:  CKD (chronic kidney disease) stage 3, GFR 30-59 ml/min, Hyperlipidemia LDL goal <100        Dose:  40 mg   Take 1 tablet (40 mg) by mouth daily    Quantity:  90 tablet   Refills:  3       bumetanide 2 MG tablet   Commonly known as:  BUMEX   Used for:  Systolic congestive heart failure, unspecified congestive heart failure chronicity (H)        Dose:  6 mg   Take 3 tablets (6 mg) by mouth 2 times daily   Quantity:  180 tablet   Refills:  3       calcitRIOL 0.25 MCG capsule   Commonly known as:  ROCALTROL   Used for:  Hypocalcemia        Dose:  0.25 mcg   Take 1 capsule (0.25 mcg) by mouth daily   Quantity:  90 capsule   Refills:  0       ferrous sulfate 325 (65 FE) MG tablet   Commonly known as:  IRON   Used for:  Anemia in stage 5 chronic kidney disease (H), CKD (chronic kidney disease) stage 5, GFR less than 15 ml/min (H)        Dose:  1 tablet   Take 1 tablet (325 mg) by mouth   Quantity:  200 tablet   Refills:  3       glipiZIDE 5 MG tablet   Commonly known as:  GLUCOTROL   Used for:  Type 2 diabetes mellitus with other specified complication (H)        Take 1 tablet by mouth. TAKE 1 TABLET BY MOUTH DAILY BEFORE A MEAL   Quantity:  90 tablet   Refills:  0       hydrALAZINE 50 MG tablet   Commonly known as:  APRESOLINE   Used for:  Type 2 diabetes mellitus with stage 5 chronic kidney disease not on chronic dialysis, without long-term current use of insulin (H)        Dose:  50 mg   Take 1 tablet (50 mg) by mouth 3 times daily   Quantity:  270 tablet   Refills:  3       isosorbide mononitrate 60 MG 24 hr tablet   Commonly known as:  IMDUR   Used for:  Chronic systolic congestive heart failure (H)        Dose:  60 mg   Take 1 tablet (60 mg) by mouth daily   Quantity:  90 tablet   Refills:  3       loratadine 10 MG tablet   Commonly known as:  CLARITIN   Used for:  Hypertensive cardiopathy, SOB (shortness of breath)        Dose:  10 mg   Take 10 mg by mouth daily as needed Reported on 5/3/2017   Refills:  0       losartan 100 MG tablet   Commonly known as:  COZAAR   Used for:  Renal hypertension, stage 1-4 or unspecified chronic kidney disease        TAKE 1  TABLET(100 MG) BY MOUTH DAILY   Quantity:  90 tablet   Refills:  0       metolazone 2.5 MG tablet   Commonly known as:  ZAROXOLYN   Used for:  Other hypervolemia        Dose:  2.5 mg   Take 1 tablet (2.5 mg) by mouth every other day   Quantity:  90 tablet   Refills:  1       metoprolol 100 MG 24 hr tablet   Commonly known as:  TOPROL XL   Used for:  Chronic systolic congestive heart failure (H)        Dose:  100 mg   Take 1 tablet (100 mg) by mouth daily   Quantity:  90 tablet   Refills:  3       omeprazole 20 MG CR capsule   Commonly known as:  priLOSEC        Dose:  20 mg   Take 20 mg by mouth daily At night   Refills:  0       potassium chloride SA 20 MEQ CR tablet   Commonly known as:  K-DUR/KLOR-CON M   Used for:  Combined systolic and diastolic congestive heart failure, unspecified congestive heart failure chronicity (H), Hypervolemia, unspecified hypervolemia type, Hospital discharge follow-up        TAKE 2 TABLETS BY MOUTH EVERY MORNING AND 1 TABLET BY MOUTH IN THE EVENING   Quantity:  270 tablet   Refills:  1       predniSONE 5 MG tablet   Commonly known as:  DELTASONE   Used for:  Gout, unspecified cause, unspecified chronicity, unspecified site, Chronic gout of wrist, unspecified cause, unspecified laterality        Dose:  5 mg   Take 1 tablet (5 mg) by mouth daily   Quantity:  90 tablet   Refills:  1       sodium bicarbonate 650 MG tablet   Used for:  End stage renal disease (H)        TAKE 2 TABLETS BY MOUTH THREE TIMES DAILY   Quantity:  180 tablet   Refills:  0       * Notice:  This list has 2 medication(s) that are the same as other medications prescribed for you. Read the directions carefully, and ask your doctor or other care provider to review them with you.             Protect others around you: Learn how to safely use, store and throw away your medicines at www.disposemymeds.org.             Medication List: This is a list of all your medications and when to take them. Check marks below indicate  your daily home schedule. Keep this list as a reference.      Medications           Morning Afternoon Evening Bedtime As Needed    albuterol 108 (90 BASE) MCG/ACT Inhaler   Commonly known as:  PROAIR HFA/PROVENTIL HFA/VENTOLIN HFA   Inhale 2 puffs into the lungs every 6 hours as needed for shortness of breath / dyspnea or wheezing   Last time this was given:  2 puffs on 11/14/2017 12:29 AM                                * allopurinol 300 MG tablet   Commonly known as:  ZYLOPRIM   Take 1 tablet (300 mg) by mouth daily Take with 100 mg tabs for 400 mg /day total.   Last time this was given:  400 mg on 11/15/2017  8:58 AM                                * allopurinol 100 MG tablet   Commonly known as:  ZYLOPRIM   Take 1 tablet (100 mg) by mouth daily Take with 300 mg tabs for 400 mg /day total.   Last time this was given:  400 mg on 11/15/2017  8:58 AM                                amLODIPine 5 MG tablet   Commonly known as:  NORVASC   Take 1 tablet (5 mg) by mouth daily   Last time this was given:  5 mg on 11/15/2017  8:58 AM                                aspirin 81 MG tablet   Take 1 tablet (81 mg) by mouth at bedtime                                atorvastatin 40 MG tablet   Commonly known as:  LIPITOR   Take 1 tablet (40 mg) by mouth daily   Last time this was given:  40 mg on 11/15/2017  8:58 AM                                bumetanide 2 MG tablet   Commonly known as:  BUMEX   Take 3 tablets (6 mg) by mouth 2 times daily   Last time this was given:  6 mg on 11/15/2017  9:48 AM                                calcitRIOL 0.25 MCG capsule   Commonly known as:  ROCALTROL   Take 1 capsule (0.25 mcg) by mouth daily   Last time this was given:  0.25 mcg on 11/15/2017  8:58 AM                                ferrous sulfate 325 (65 FE) MG tablet   Commonly known as:  IRON   Take 1 tablet (325 mg) by mouth                                glipiZIDE 5 MG tablet   Commonly known as:  GLUCOTROL   Take 1 tablet by mouth. TAKE 1  TABLET BY MOUTH DAILY BEFORE A MEAL                                hydrALAZINE 50 MG tablet   Commonly known as:  APRESOLINE   Take 1 tablet (50 mg) by mouth 3 times daily   Last time this was given:  50 mg on 11/15/2017  8:59 AM                                isosorbide mononitrate 60 MG 24 hr tablet   Commonly known as:  IMDUR   Take 1 tablet (60 mg) by mouth daily   Last time this was given:  60 mg on 11/15/2017  8:58 AM                                loratadine 10 MG tablet   Commonly known as:  CLARITIN   Take 10 mg by mouth daily as needed Reported on 5/3/2017                                losartan 100 MG tablet   Commonly known as:  COZAAR   TAKE 1 TABLET(100 MG) BY MOUTH DAILY   Last time this was given:  100 mg on 11/15/2017  8:58 AM                                metolazone 2.5 MG tablet   Commonly known as:  ZAROXOLYN   Take 1 tablet (2.5 mg) by mouth every other day   Last time this was given:  2.5 mg on 11/14/2017  9:16 AM                                metoprolol 100 MG 24 hr tablet   Commonly known as:  TOPROL XL   Take 1 tablet (100 mg) by mouth daily   Last time this was given:  50 mg on 11/15/2017  8:58 AM                                omeprazole 20 MG CR capsule   Commonly known as:  priLOSEC   Take 20 mg by mouth daily At night   Last time this was given:  20 mg on 11/14/2017  9:29 PM                                potassium chloride SA 20 MEQ CR tablet   Commonly known as:  K-DUR/KLOR-CON M   TAKE 2 TABLETS BY MOUTH EVERY MORNING AND 1 TABLET BY MOUTH IN THE EVENING                                predniSONE 5 MG tablet   Commonly known as:  DELTASONE   Take 1 tablet (5 mg) by mouth daily   Last time this was given:  5 mg on 11/15/2017  8:59 AM                                sodium bicarbonate 650 MG tablet   TAKE 2 TABLETS BY MOUTH THREE TIMES DAILY   Last time this was given:  650 mg on 11/15/2017  8:58 AM                                * Notice:  This list has 2 medication(s) that are the  same as other medications prescribed for you. Read the directions carefully, and ask your doctor or other care provider to review them with you.

## 2017-11-13 NOTE — LETTER
Transition Communication Hand-off for Care Transitions to Next Level of Care Provider    Name: Murray Nicholson  MRN #: [id number]  Primary Care Provider: Yahir Turcios     Primary Clinic: [address] [place] [id number]     Reason for Hospitalization:  Acute on chronic systolic congestive heart failure (H) [I50.23]  Other hypervolemia [E87.79]  Admit Date/Time: 11/13/2017  4:34 PM  Discharge Date: 11/15/2017    Payor Source: Payor: [employer] / Plan: [employer] / Product Type: PPO /            Reason for Communication Hand-off Referral: Other Continuity of care    Discharge Plan: discharge to home with resumption of PD    Concern for non-adherence with plan of care:  No    Already enrolled in Tele-monitoring program and name of program:  NA  Follow-up specialty is recommended: No    Follow-up plan:  Future Appointments  Date Time Provider Department Center   11/24/2017 11:20 AM Yahir Turcios MD [facility]   12/15/2017 9:30 AM Darvin Tang MD [facility]       May Benson RN Care Coordinator  [phone]    AVS/Discharge Summary is the source of truth; this is a helpful guide for improved communication of patient story

## 2017-11-14 LAB
ANION GAP SERPL CALCULATED.3IONS-SCNC: 11 MMOL/L (ref 3–14)
BUN SERPL-MCNC: 76 MG/DL (ref 7–30)
CALCIUM SERPL-MCNC: 8.9 MG/DL (ref 8.5–10.1)
CHLORIDE SERPL-SCNC: 103 MMOL/L (ref 94–109)
CO2 SERPL-SCNC: 27 MMOL/L (ref 20–32)
CREAT SERPL-MCNC: 6.85 MG/DL (ref 0.66–1.25)
ERYTHROCYTE [DISTWIDTH] IN BLOOD BY AUTOMATED COUNT: 17.5 % (ref 10–15)
GFR SERPL CREATININE-BSD FRML MDRD: 8 ML/MIN/1.7M2
GLUCOSE BLDC GLUCOMTR-MCNC: 140 MG/DL (ref 70–99)
GLUCOSE BLDC GLUCOMTR-MCNC: 165 MG/DL (ref 70–99)
GLUCOSE BLDC GLUCOMTR-MCNC: 174 MG/DL (ref 70–99)
GLUCOSE BLDC GLUCOMTR-MCNC: 203 MG/DL (ref 70–99)
GLUCOSE SERPL-MCNC: 312 MG/DL (ref 70–99)
HBV SURFACE AB SERPL IA-ACNC: 14.52 M[IU]/ML
HBV SURFACE AG SERPL QL IA: NONREACTIVE
HCT VFR BLD AUTO: 29.8 % (ref 40–53)
HGB BLD-MCNC: 9.1 G/DL (ref 13.3–17.7)
INTERPRETATION ECG - MUSE: NORMAL
LACTATE BLD-SCNC: 0.9 MMOL/L (ref 0.7–2)
MAGNESIUM SERPL-MCNC: 1.8 MG/DL (ref 1.6–2.3)
MCH RBC QN AUTO: 31.6 PG (ref 26.5–33)
MCHC RBC AUTO-ENTMCNC: 30.5 G/DL (ref 31.5–36.5)
MCV RBC AUTO: 104 FL (ref 78–100)
PHOSPHATE SERPL-MCNC: 4.3 MG/DL (ref 2.5–4.5)
PLATELET # BLD AUTO: 234 10E9/L (ref 150–450)
POTASSIUM SERPL-SCNC: 3.7 MMOL/L (ref 3.4–5.3)
RBC # BLD AUTO: 2.88 10E12/L (ref 4.4–5.9)
SODIUM SERPL-SCNC: 141 MMOL/L (ref 133–144)
TROPONIN I SERPL-MCNC: 0.23 UG/L (ref 0–0.04)
WBC # BLD AUTO: 10.1 10E9/L (ref 4–11)

## 2017-11-14 PROCEDURE — 84484 ASSAY OF TROPONIN QUANT: CPT | Performed by: STUDENT IN AN ORGANIZED HEALTH CARE EDUCATION/TRAINING PROGRAM

## 2017-11-14 PROCEDURE — G0378 HOSPITAL OBSERVATION PER HR: HCPCS

## 2017-11-14 PROCEDURE — 85027 COMPLETE CBC AUTOMATED: CPT | Performed by: STUDENT IN AN ORGANIZED HEALTH CARE EDUCATION/TRAINING PROGRAM

## 2017-11-14 PROCEDURE — 83735 ASSAY OF MAGNESIUM: CPT | Performed by: STUDENT IN AN ORGANIZED HEALTH CARE EDUCATION/TRAINING PROGRAM

## 2017-11-14 PROCEDURE — 83605 ASSAY OF LACTIC ACID: CPT | Performed by: INTERNAL MEDICINE

## 2017-11-14 PROCEDURE — 80048 BASIC METABOLIC PNL TOTAL CA: CPT | Performed by: STUDENT IN AN ORGANIZED HEALTH CARE EDUCATION/TRAINING PROGRAM

## 2017-11-14 PROCEDURE — 00000146 ZZHCL STATISTIC GLUCOSE BY METER IP

## 2017-11-14 PROCEDURE — 25000132 ZZH RX MED GY IP 250 OP 250 PS 637: Performed by: STUDENT IN AN ORGANIZED HEALTH CARE EDUCATION/TRAINING PROGRAM

## 2017-11-14 PROCEDURE — 40000095 ZZH STATISTIC LEVEL 2 EST PATIENT

## 2017-11-14 PROCEDURE — 36415 COLL VENOUS BLD VENIPUNCTURE: CPT | Performed by: STUDENT IN AN ORGANIZED HEALTH CARE EDUCATION/TRAINING PROGRAM

## 2017-11-14 PROCEDURE — 25000131 ZZH RX MED GY IP 250 OP 636 PS 637: Performed by: STUDENT IN AN ORGANIZED HEALTH CARE EDUCATION/TRAINING PROGRAM

## 2017-11-14 PROCEDURE — 27210995 ZZH RX 272: Performed by: INTERNAL MEDICINE

## 2017-11-14 PROCEDURE — 84100 ASSAY OF PHOSPHORUS: CPT | Performed by: STUDENT IN AN ORGANIZED HEALTH CARE EDUCATION/TRAINING PROGRAM

## 2017-11-14 PROCEDURE — 96372 THER/PROPH/DIAG INJ SC/IM: CPT

## 2017-11-14 PROCEDURE — 99226 ZZC SUBSEQUENT OBSERVATION CARE,LEVEL III: CPT | Mod: GC | Performed by: INTERNAL MEDICINE

## 2017-11-14 PROCEDURE — 25000125 ZZHC RX 250: Performed by: STUDENT IN AN ORGANIZED HEALTH CARE EDUCATION/TRAINING PROGRAM

## 2017-11-14 PROCEDURE — 36415 COLL VENOUS BLD VENIPUNCTURE: CPT | Performed by: INTERNAL MEDICINE

## 2017-11-14 PROCEDURE — S0169 CALCITROL: HCPCS | Performed by: STUDENT IN AN ORGANIZED HEALTH CARE EDUCATION/TRAINING PROGRAM

## 2017-11-14 RX ORDER — DEXTROSE MONOHYDRATE 25 G/50ML
50 INJECTION, SOLUTION INTRAVENOUS ONCE
Status: DISCONTINUED | OUTPATIENT
Start: 2017-11-14 | End: 2017-11-14

## 2017-11-14 RX ADMIN — ATORVASTATIN CALCIUM 40 MG: 40 TABLET, FILM COATED ORAL at 09:16

## 2017-11-14 RX ADMIN — METOPROLOL SUCCINATE 50 MG: 25 TABLET, EXTENDED RELEASE ORAL at 09:16

## 2017-11-14 RX ADMIN — OMEPRAZOLE 20 MG: 20 CAPSULE, DELAYED RELEASE ORAL at 00:51

## 2017-11-14 RX ADMIN — HYDRALAZINE HYDROCHLORIDE 50 MG: 25 TABLET ORAL at 19:54

## 2017-11-14 RX ADMIN — SODIUM BICARBONATE 650 MG TABLET 650 MG: at 19:54

## 2017-11-14 RX ADMIN — AMLODIPINE BESYLATE 5 MG: 2.5 TABLET ORAL at 09:23

## 2017-11-14 RX ADMIN — SODIUM BICARBONATE 650 MG TABLET 650 MG: at 09:16

## 2017-11-14 RX ADMIN — INSULIN ASPART 1 UNITS: 100 INJECTION, SOLUTION INTRAVENOUS; SUBCUTANEOUS at 17:58

## 2017-11-14 RX ADMIN — ASPIRIN 81 MG: 81 TABLET, COATED ORAL at 21:29

## 2017-11-14 RX ADMIN — SODIUM CHLORIDE, SODIUM LACTATE, CALCIUM CHLORIDE, MAGNESIUM CHLORIDE AND DEXTROSE: 4.25; 538; 448; 18.3; 5.08 INJECTION, SOLUTION INTRAPERITONEAL at 19:24

## 2017-11-14 RX ADMIN — PREDNISONE 5 MG: 5 TABLET ORAL at 09:24

## 2017-11-14 RX ADMIN — SODIUM BICARBONATE 650 MG TABLET 650 MG: at 00:43

## 2017-11-14 RX ADMIN — BENZONATATE 100 MG: 100 CAPSULE ORAL at 21:29

## 2017-11-14 RX ADMIN — ASPIRIN 81 MG: 81 TABLET, COATED ORAL at 00:43

## 2017-11-14 RX ADMIN — ACETAMINOPHEN 650 MG: 325 TABLET, FILM COATED ORAL at 00:43

## 2017-11-14 RX ADMIN — HYDRALAZINE HYDROCHLORIDE 50 MG: 25 TABLET ORAL at 09:16

## 2017-11-14 RX ADMIN — CALCITRIOL 0.25 MCG: 0.25 CAPSULE, LIQUID FILLED ORAL at 09:24

## 2017-11-14 RX ADMIN — BUMETANIDE 6 MG: 2 TABLET ORAL at 19:54

## 2017-11-14 RX ADMIN — BUMETANIDE 6 MG: 2 TABLET ORAL at 09:23

## 2017-11-14 RX ADMIN — ALLOPURINOL 400 MG: 100 TABLET ORAL at 09:16

## 2017-11-14 RX ADMIN — HYDRALAZINE HYDROCHLORIDE 50 MG: 25 TABLET ORAL at 00:43

## 2017-11-14 RX ADMIN — ALBUTEROL SULFATE 2 PUFF: 90 AEROSOL, METERED RESPIRATORY (INHALATION) at 00:29

## 2017-11-14 RX ADMIN — BENZONATATE 100 MG: 100 CAPSULE ORAL at 00:43

## 2017-11-14 RX ADMIN — INSULIN ASPART 1 UNITS: 100 INJECTION, SOLUTION INTRAVENOUS; SUBCUTANEOUS at 10:37

## 2017-11-14 RX ADMIN — OMEPRAZOLE 20 MG: 20 CAPSULE, DELAYED RELEASE ORAL at 21:29

## 2017-11-14 RX ADMIN — SODIUM BICARBONATE 650 MG TABLET 650 MG: at 15:08

## 2017-11-14 RX ADMIN — LOSARTAN POTASSIUM 100 MG: 100 TABLET, FILM COATED ORAL at 09:24

## 2017-11-14 RX ADMIN — METOLAZONE 2.5 MG: 2.5 TABLET ORAL at 09:16

## 2017-11-14 RX ADMIN — ISOSORBIDE MONONITRATE 60 MG: 60 TABLET, EXTENDED RELEASE ORAL at 09:24

## 2017-11-14 RX ADMIN — BUMETANIDE 6 MG: 2 TABLET ORAL at 16:01

## 2017-11-14 RX ADMIN — HYDRALAZINE HYDROCHLORIDE 50 MG: 25 TABLET ORAL at 15:08

## 2017-11-14 RX ADMIN — BUMETANIDE 6 MG: 2 TABLET ORAL at 00:43

## 2017-11-14 NOTE — PROVIDER NOTIFICATION
Provider notified that patient arrived to the floor and the need for orders. Will come see patient in a little bit.
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
## 2017-11-14 NOTE — PLAN OF CARE
"Problem: Patient Care Overview  Goal: Plan of Care/Patient Progress Review  Outpatient/Observation goals to be met before discharge home:  -Diagnostic tests and consults completed and resulted : No   -Vital signs normal or at patient baseline: No.On supplemental O2  -Dyspnea improved and O2 sats greater than 88% on room air or prior home oxygen levels : No. On 3-5LPM O2/ Oxymask. Sat% 95%   Pt reports relief from cough and SOB. Peritoneal dialysis cycling until~8am .Denies pain.Will continue to monitor.  /86 (BP Location: Left arm)  Pulse 105  Temp 98.9  F (37.2  C) (Oral)  Resp 22  Ht 1.753 m (5' 9\")  Wt 94.3 kg (208 lb)  SpO2 98%  BMI 30.72 kg/m2          "

## 2017-11-14 NOTE — PROGRESS NOTES
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 Admitted with hypervolemia and pulm edema    ESRD on PD  Sec to DM nephropathy  EDW 86.5Kg  Current weight 90.8    Will use 4.25% soln tonight again for a better UF with 2000 fill volume , dwell time 99min   Will recommend to better control his blood sugars to maintain the gradient for improved UF  Keep on daily PO fluid restriction of 1500cc  And 2gm sodium diet  Monitor IO and daily weights    Hypervolemia with pulm edema  UF plans as above  Mild improvement since yesterday    Electrolytes  Stable    Bicarb 27    Anemia sec to ESRD  Hb is 9.1   Will monitor  On EPO every other wenesday 25200 units and PO ferrous sulfate    BMD     PTA meds calcitriol 0.25mcg    On transplant list since 2015      Recommendations were communicated to primary team     Seen and discussed with Dr. Shilpi Calle MD   [phone]    Interval History :   In the last 24 hours Murray Nicholson has mild improvement in his dyspnea  Review of Systems:   I reviewed the following systems:  GI: low  appetite. - nausea or vomiting or diarrhea.   Neuro:  - confusion  Constitutional:  - fever or chills  CV: + dyspnea or edema.  - chest pain.    Physical Exam:   I/O last 3 completed shifts:  In: 120 [P.O.:120]  Out: 680 [Urine:680]   /71 (BP Location: Left arm)  Pulse 100  Temp 99.1  F (37.3  C) (Axillary)  Resp 16  Ht 1.753 m (5' 9\")  Wt 90.8 kg (200 lb 3.2 oz)  SpO2 100%  BMI 29.56 kg/m2     GENERAL APPEARANCE: sleepy and tired  EYES:  - scleral icterus, pupils equal  HENT: mouth without ulcers or lesions  PULM: lungs wheezing and fine crepts to auscultation,  bilaterally, equal air movement, no clubbing  CV: regular rhythm, normal rate, no rub     -JVP +     -edema +   GI: soft, non tender, non distended  INTEGUMENT: no cyanosis, - " rash  NEURO:  - asterixis   Access PD cathter    Labs:   All labs reviewed by me  Electrolytes/Renal -   Recent Labs   Lab Test  11/14/17   0645  11/13/17   1709  08/02/17   1311   06/30/17   0555  06/29/17   1824   06/21/17   1058   NA  141  142  139   < >  142  138   < >  139   POTASSIUM  3.7  4.4  4.1   < >  3.3*  4.5   < >  3.8   CHLORIDE  103  106  102   < >  103  102   < >  106   CO2  27  28  22   < >  27  28   < >  21   BUN  76*  81*  137*   < >  133*  141*   < >  140*   CR  6.85*  7.11*  6.74*   < >  5.69*  5.68*   < >  5.28*   GLC  312*  231*  139*   < >  137*  212*   < >  140*   TRINI  8.9  8.3*  8.8   < >  8.9  9.1   < >  8.4*   MAG  1.8   --    --    --   2.2  2.0   < >   --    PHOS  4.3   --   4.8*   --    --    --    --   5.0*    < > = values in this interval not displayed.       CBC -   Recent Labs   Lab Test  11/14/17   0645  11/13/17 1709 08/02/17   1311   WBC  10.1  9.6  7.1   HGB  9.1*  8.9*  10.4*   PLT  234  277  221       LFTs -   Recent Labs   Lab Test  11/13/17 1709 08/02/17   1311  07/05/17   1204   05/31/17   1111   ALKPHOS  104   --   98   --   97   BILITOTAL  0.8   --   0.5   --   0.5   ALT  26   --   15   --   16   AST  22   --   13   --   18   PROTTOTAL  6.0*   --   6.2*   --   6.8   ALBUMIN  2.3*  3.2*  2.7*   < >  2.7*    < > = values in this interval not displayed.       Iron Panel -   Recent Labs   Lab Test  07/19/17   1306  07/05/17   1204  06/21/17   1058   IRON  46  26*  69   IRONSAT  18  12*  29   ALBERTINA  369  542*  557*         Imaging:  All imaging studies reviewed by me.     Current Medications:    atorvastatin  40 mg Oral Daily     bumetanide  6 mg Oral TID     calcitRIOL  0.25 mcg Oral Daily     hydrALAZINE  50 mg Oral TID     isosorbide mononitrate  60 mg Oral Daily     losartan  100 mg Oral Daily     metolazone  2.5 mg Oral Every Other Day     allopurinol  400 mg Oral Daily     predniSONE  5 mg Oral Daily     sodium bicarbonate  650 mg Oral TID     amLODIPine  5 mg Oral  Daily     gentamicin   Topical Daily     metoprolol  50 mg Oral Daily     insulin aspart  1-7 Units Subcutaneous TID AC     insulin aspart  1-5 Units Subcutaneous At Bedtime     aspirin EC  81 mg Oral At Bedtime     omeprazole  20 mg Oral At Bedtime       peritoneal dialysis solutions ADULT       Madyson Calle MD     I, Russel Dobbins, saw this patient with Dr. Calle and agree with the findings and plan of care as documented in the note.

## 2017-11-14 NOTE — PROVIDER NOTIFICATION
"Intern text paged RE: \"6D 518-2 E.H. Pt triggered sepsis BPA. Vitals taken and normal x2 and LA 0.9 Thanks! Rodrigo RN [id number]\"    Page was not returned however spoke with team when they rounded shortly after. No concerns at this time.  "

## 2017-11-14 NOTE — PROGRESS NOTES
Care Coordinator Progress Note     Admission Date/Time:  11/13/2017  Attending MD:  Breanne Miller MD     Data  Chart reviewed, discussed with interdisciplinary team.   Patient was admitted for:    Acute on chronic systolic congestive heart failure (H)  Other hypervolemia.    Concerns with insurance coverage for discharge needs: None.  Current Living Situation: Patient lives alone.  Support System: Involved  Services Involved: Dialysis Services  Transportation: Family or Friend will provide  Barriers to Discharge: medical clearance        Assessment  Pt is a 62 year old male with PMH significant for CHF 2/2 ICM with last EF 20-25%, ESRD on PD, T2DM, and HTN who presented with shortness of breath. Met with pt at bedside to introduce RNCC role and discuss discharge planning. Pt lives alone in an apartment on the second floor with no elevator. Pt is independent as baseline and stated that he typically is able to go up/down the stairs without issue. Pt does PD with [facility] PD (phone: [phone] and fax: [phone]) and PD center aware of pt's admission. Pt stated that he is still adjusting to peritoneal dialysis and typically uses the green dextrose bags for PD but needed a reg dextrose bag yesterday (due to volume overload) and the delivering company ([employer]) was unable to deliver it. Spoke with [facility] RN, who stated that they spoke with pt yesterday and educated pt that if he ever needs a red dextrose bag he can come to [facility] to get a red bag. [facility] also stated that pt was very SOB on the phone and Dr. Mcpherson recommended pt go to the ED. Pt currently on supplemental oxygen which is not pt's baseline. Will continue to follow plan of care and assist with discharge planning as needed.      Plan  Anticipated Discharge Date:  1-2 days  Anticipated Discharge Plan:  Discharge to home with resumption of home PD    May Benson RN

## 2017-11-14 NOTE — PROGRESS NOTES
[facility], [place]    Internal Medicine Progress Note - [facility]    Main Plans for Today   - PD today  - continue diuresis  - monitor off of oxygen     Assessment & Plan   Murray Nicholson is a 62 year old male with PMH notable for CHF 2/2 ICM w last EF 20-25%, ESRD on PD, T2DM, ascending aortic aneurysm, and HTN who presents with shortness of breath for a few days.      #Lower extremity edema  #Orthopnea  #Cough  #Acute on chronic CHF exacerbation 2/2 ICM w/ last EF 20-25%  Patient with known CHF with last EF 20-25%. Last seen by cardiology on 7/5/17. Appears goal dry weight ~185lbs per their note. Plan was to let patient start on dialysis then plan for coronary artery imaging to assess for systolic heart failure decompensation. States having acute increase in LE edema, orthopnea, and new onset cough in setting of not having his correct dialysate for PD. Some reported pink sputum while coughing yesterday. Weight elevated to 208lbs. VSS on 2L [place] [address]. CXR notable for pulmonary edema. Consistent with HF exacerbation 2/2 volume overload. Unlikely 2/2 repeat ischemia as very mild trop elevation in setting of ESRD and no chest pain reported. Will plan to consult nephrology for PD tonight and plan for aggressive diuresis.   -increase bumex to 6mg TID ,metolazone 2.5mg every other day (first dose tomorrow)  -nephrology consult, appreciate recs  -continue PTA hydralazine 50mg TID, imdur 60mg daily, losartan 100mg daily, atorvastatin 40mg  -will decrease metoprolol XL to 50mg  -oxygen as needed to keep O2 sat>92%  -tessalon for cough  -BMP, Mg, Phos in AM     #Troponinemia  Patient with slight elevation of troponin to 0.165 in setting of ESRD on PD. This likely is 2/2 his gross volume overload and some slight demand ischemia. EKG reassuring. Will plan to trend until downtrend. Trop up to 0.230.  -repeat troponin  -will assess trend     #ESRD on PD  #Macrocytic  anemia  Patient with ESRD on PD 2/2 HTN and DM now on PD. Has known complications of mild anemia, acid base disturbance on NaHCO3, hypocalcemia improved with calcitriol. Appears to have had issue with obtaining his proper dialysate which has prompted him to be unable to take appropriate volume with his PD. Cr today 7.11. Will plan to consult nephrology for dialysis and continue PTA meds   -nephrology consult, appreciate recs  -continue PTA calcitriol, NaHCO3  -will hold glipizide  -CBC in AM     #Gout  Patient with known gout, seen by rheumatology on 8/18/17 with plan to increase allopurinol to 400mg daily and use prednisone 5mg daily for flare ppx  -continue allopurinol, prednisone     #T2DM  -will hold home glipizide  -med SSI     #GERD  -continue PTA omeprazole     #HTN  -as noted above, continue antihypertensives with only change being decrease of Toprol XL    Diet: 2 Gram Sodium Diet  Fluid restriction 1800 ML FLUID  Fluids: none  DVT Prophylaxis: Pneumatic Compression Devices  Code Status: Full Code    Disposition Plan   Expected discharge: Tomorrow, recommended to prior living arrangement once closer to dry weight and stable off oxygen.     Entered: Rosaline Mary 11/14/2017, 1:42 PM   Information in the above section will display in the discharge planner report.      The patient's care was discussed with the Attending Physician, Dr. Murray.    Rosaline Mary  [facility]: 3  Pager: [phone]  Please see sticky note for cross cover information    Interval History   [place]. Dialyzed overnight. Reports leg cramping with dialysis. Improvement in breathing. No longer requiring O2. No chest pain, no belly pain, no fevers/chills. LE edema improved mildly today.    Physical Exam   Vital Signs: Temp: 98.1  F (36.7  C) Temp src: Axillary BP: 133/77 Pulse: 100 Heart Rate: 100 Resp: 18 SpO2: 96 % O2 Device: Oxymask Oxygen Delivery: 5 LPM  Weight: 200 lbs 3.2 oz  General Appearance: NAD, sitting up in  bed eating breakfast, LFNC off face and satting 95%  Respiratory: NLB, crackles in bases bilaterally  Cardiovascular: RRR, no MRG, S1 and S2 without S3 and S4  GI: soft, nontender, nondistended, dialysis catheter site clean without skin changes  Skin: no bleeding/bruising/rashes appreciated  Other: 1+ LE edema bilaterally         Data   Medications     peritoneal dialysis solutions ADULT Stopped (11/14/17 0900)     peritoneal dialysis solutions ADULT Stopped (11/14/17 0900)       atorvastatin  40 mg Oral Daily     bumetanide  6 mg Oral TID     calcitRIOL  0.25 mcg Oral Daily     hydrALAZINE  50 mg Oral TID     isosorbide mononitrate  60 mg Oral Daily     losartan  100 mg Oral Daily     metolazone  2.5 mg Oral Every Other Day     allopurinol  400 mg Oral Daily     predniSONE  5 mg Oral Daily     sodium bicarbonate  650 mg Oral TID     amLODIPine  5 mg Oral Daily     gentamicin   Topical Daily     metoprolol  50 mg Oral Daily     insulin aspart  1-7 Units Subcutaneous TID AC     insulin aspart  1-5 Units Subcutaneous At Bedtime     aspirin EC  81 mg Oral At Bedtime     omeprazole  20 mg Oral At Bedtime     Data     Recent Labs  Lab 11/14/17  0944 11/14/17  0645 11/13/17  2230 11/13/17  1709   WBC  --  10.1  --  9.6   HGB  --  9.1*  --  8.9*   MCV  --  104*  --  101*   PLT  --  234  --  277   NA  --  141  --  142   POTASSIUM  --  3.7  --  4.4   CHLORIDE  --  103  --  106   CO2  --  27  --  28   BUN  --  76*  --  81*   CR  --  6.85*  --  7.11*   ANIONGAP  --  11  --  9   TRINI  --  8.9  --  8.3*   GLC  --  312*  --  231*   ALBUMIN  --   --   --  2.3*   PROTTOTAL  --   --   --  6.0*   BILITOTAL  --   --   --  0.8   ALKPHOS  --   --   --  104   ALT  --   --   --  26   AST  --   --   --  22   TROPI 0.230*  --  0.186* 0.165*     Recent Results (from the past 24 hour(s))   POC US ECHO LIMITED    Impression    Limited Bedside Cardiac Ultrasound, performed and interpreted by me.   Indication: Shortness of Breath.  Parasternal  long axis, parasternal short axis, apical 4 chamber and subcostal views were acquired.   Image quality was satisfactory.    Findings:    Abnormal decreased global function, no pericardial effusion    IMPRESSION: Abnormal limited cardiac ultrasound showing decreased global function.  No pericardial effusion.   XR Chest 2 Views    Narrative    XR CHEST 2 VW  11/13/2017 7:57 PM      HISTORY: increasing SOB, eval for pulm edema;     COMPARISON: 6/26/2017    FINDINGS: PA and lateral views of the chest. Cardiomegaly with  perihilar interstitial opacities. No pneumothorax. No significant  pleural fluid.      Impression    IMPRESSION: Cardiomegaly with moderate pulmonary edema.    I have personally reviewed the examination and initial interpretation  and I agree with the findings.    BAILEY JEFFERSON MD   XR Abdomen 2 Views    Narrative    XR ABDOMEN 2 VW  11/13/2017 9:28 PM      HISTORY: eval for constipation/impaction in the setting of peritoneal  dialysis;     COMPARISON: 7/7/2017, same-day chest x-ray    FINDINGS: Supine and upright frontal views of the abdomen. Mildly  distended stomach. Otherwise nonobstructive bowel. Peritoneal dialysis  catheter with tip in the pelvis. Mild colonic stool burden. Bibasilar  opacities interstitial opacities.      Impression    IMPRESSION:   1. Prominent gastric bubble with an otherwise nonobstructive bowel gas  appearance.  2. Bibasilar interstitial opacities concerning for pulmonary edema.    I have personally reviewed the examination and initial interpretation  and I agree with the findings.    BAILEY JEFFERSON MD

## 2017-11-14 NOTE — H&P
[facility], [place]    Internal Medicine History and Physical - [facility] Service       Date of Admission:  11/13/2017    Chief Complaint   Shortness of breath  Cough    History is obtained from the patient and chart    History of Present Illness   Murray Nicholson is a 62 year old male with PMH notable for CHF 2/2 ICM w last EF 20-25%, ESRD on PD, T2DM, and HTN who presents with shortness of breath for a few days.     The patient states he does peritoneal dialysis but has been unable to complete full runs 2/2 not being able to get his red dialysate due to lack of supplies from the company. He states that yesterday while cycling he began to feel short of breath, which caused him to cease his run. At that time he called [facility] dialysis who told aurea to present to the ED for evaluation. He also endorses some LE edema that is worsened from baseline, a dry cough, orthopnea, and an 18 lb weight gain.     The cough has been dry in nature as noted above though he has had a few episodes of pink frothy sputum. States that he has had this before in the past when very volume overloaded. Patient states cough is his most bothersome symptom as it keeps him up at night.    Patient states that over the same time course he has noted not being able to lie flat in bed to sleep. States that he has been having to sleep sitting up in a chair due to this.    The patient also is reporting increased swelling of his lower extremities. States he normally has a small amount of this but is able to see his ankles at home at baseline. Says that he can tell he is accumulating fluid when he is unable to see his ankles particularly.    He has been taking all of his medications as prescribed    He reports no vision changes, chest pain, nausea/vomiting.     Review of Systems   The 10 point Review of Systems is negative other than noted in the HPI or here.     Past Medical History    I have reviewed this patient's medical history  and updated it with pertinent information if needed.   Past Medical History:   Diagnosis Date     (HFpEF) heart failure with preserved ejection fraction (H)      Allergic rhinitis, cause unspecified      Anemia of chronic kidney failure      Ascending aortic aneurysm (H)      Bicuspid aortic valve      CAD (coronary artery disease)      Chronic kidney disease, stage 5 (H)      Congestive heart failure, unspecified      Dyslipidemia      Esophageal reflux      Hypersomnia with sleep apnea, unspecified      Hypertension      MGUS (monoclonal gammopathy of unknown significance)      SHEELA (obstructive sleep apnea)      Systolic heart failure (H)      Type 2 diabetes mellitus (H)         Past Surgical History   I have reviewed this patient's surgical history and updated it with pertinent information if needed.  Past Surgical History:   Procedure Laterality Date     ABDOMEN SURGERY      Hernia     LAPAROSCOPIC HERNIORRHAPHY INGUINAL BILATERAL Bilateral 2015    Procedure: LAPAROSCOPIC HERNIORRHAPHY INGUINAL BILATERAL;  Surgeon: Bobby Mcconnell MD;  Location:  OR     LAPAROSCOPIC INSERTION CATHETER PERITONEAL DIALYSIS N/A 2017    Procedure: LAPAROSCOPIC INSERTION CATHETER PERITONEAL DIALYSIS;  Laparoscopic Peritoneal Dialysis Catheter Placement - Anesthesia with block;  Surgeon: Esteban Arvizu MD;  Location:  OR        Social History   Social History   Substance Use Topics     Smoking status: Former Smoker     Packs/day: 1.00     Years: 19.00     Types: Cigarettes     Quit date: 1994     Smokeless tobacco: Never Used     Alcohol use No   Lives in [place] by himself. Works at [employer]    Family History   I have reviewed this patient's family history and updated it with pertinent information if needed.   Family History   Problem Relation Age of Onset     C.A.D. Father       from-never knew father-age 60     DIABETES Father      CEREBROVASCULAR DISEASE Father      Hypertension No family hx of       Breast Cancer No family hx of      Cancer - colorectal No family hx of      Prostate Cancer No family hx of      KIDNEY DISEASE No family hx of        Prior to Admission Medications   Prior to Admission Medications   Prescriptions Last Dose Informant Patient Reported? Taking?   ASPIRIN 81 MG OR TABS   Yes No   Sig: Take 1 tablet (81 mg) by mouth at bedtime   albuterol (PROAIR HFA/PROVENTIL HFA/VENTOLIN HFA) 108 (90 BASE) MCG/ACT Inhaler   No No   Sig: Inhale 2 puffs into the lungs every 6 hours as needed for shortness of breath / dyspnea or wheezing   allopurinol (ZYLOPRIM) 100 MG tablet   No No   Sig: Take 1 tablet (100 mg) by mouth daily Take with 300 mg tabs for 400 mg /day total.   allopurinol (ZYLOPRIM) 300 MG tablet   No No   Sig: Take 1 tablet (300 mg) by mouth daily Take with 100 mg tabs for 400 mg /day total.   amLODIPine (NORVASC) 5 MG tablet   No No   Sig: Take 1 tablet (5 mg) by mouth daily   atorvastatin (LIPITOR) 40 MG tablet   No No   Sig: Take 1 tablet (40 mg) by mouth daily   bumetanide (BUMEX) 2 MG tablet   No No   Sig: Take 3 tablets (6 mg) by mouth 2 times daily   calcitRIOL (ROCALTROL) 0.25 MCG capsule   No No   Sig: Take 1 capsule (0.25 mcg) by mouth daily   ferrous sulfate (IRON) 325 (65 FE) MG tablet   Yes No   Sig: Take 1 tablet (325 mg) by mouth   glipiZIDE (GLUCOTROL) 5 MG tablet   No No   Sig: Take 1 tablet by mouth. TAKE 1 TABLET BY MOUTH DAILY BEFORE A MEAL   hydrALAZINE (APRESOLINE) 50 MG tablet   No No   Sig: Take 1 tablet (50 mg) by mouth 3 times daily   isosorbide mononitrate (IMDUR) 60 MG 24 hr tablet   No No   Sig: Take 1 tablet (60 mg) by mouth daily   loratadine (CLARITIN) 10 MG tablet   Yes No   Sig: Take 10 mg by mouth daily as needed Reported on 5/3/2017   losartan (COZAAR) 100 MG tablet   No No   Sig: TAKE 1 TABLET(100 MG) BY MOUTH DAILY   metolazone (ZAROXOLYN) 2.5 MG tablet   No No   Sig: Take 1 tablet (2.5 mg) by mouth every other day   metoprolol (TOPROL XL) 100 MG  24 hr tablet   No No   Sig: Take 1 tablet (100 mg) by mouth daily   omeprazole (PRILOSEC) 20 MG CR capsule  Self Yes No   Sig: Take 20 mg by mouth daily At night   potassium chloride SA (K-DUR/KLOR-CON M) 20 MEQ CR tablet   No No   Sig: TAKE 2 TABLETS BY MOUTH EVERY MORNING AND 1 TABLET BY MOUTH IN THE EVENING   predniSONE (DELTASONE) 5 MG tablet   No No   Sig: Take 1 tablet (5 mg) by mouth daily   sodium bicarbonate 650 MG tablet   No No   Sig: TAKE 2 TABLETS BY MOUTH THREE TIMES DAILY      Facility-Administered Medications: None     Allergies   Allergies   Allergen Reactions     Norco [Hydrocodone-Acetaminophen] Nausea and Vomiting     Cats      Throat tightness     Penicillins Hives     Seasonal Allergies      rhinitis     Shrimp      Throat closes        Physical Exam   Vital Signs: Temp: 98.9  F (37.2  C) Temp src: Oral BP: 138/86 Pulse: 105   Resp: 22 SpO2: 98 % O2 Device: Nasal cannula Oxygen Delivery: 3 LPM  Weight: 208 lbs 0 oz    General Appearance: male in NAD moving about the room breathing on 2L NC  Eyes: EOMI, sclera anicteric  HEENT: AT/NC  Respiratory: Crackles at the bilateral bases  Cardiovascular: tachycardic but regular rhythm, S1, S2; S3 present; no S4. No murmur  GI: soft, NT, slight distension but no guarding or rebound, BS+  Skin: warm and dry, 2+ BLE to the shins  Musculoskeletal: moving all 4 well with no limitations  Neurologic: alert and oriented to person, place, time; no overt FND      Assessment & Plan   Murray Nicholson is a 62 year old male with PMH notable for CHF 2/2 ICM w last EF 20-25%, ESRD on PD, T2DM, ascending aortic aneurysm, and HTN who presents with shortness of breath for a few days.       #Lower extremity edema  #Orthopnea  #Cough  #Acute on chronic CHF exacerbation 2/2 ICM w/ last EF 20-25%  Patient with known CHF with last EF 20-25%. Last seen by cardiology on 7/5/17. Appears goal dry weight ~185lbs per their note. Plan was to let patient start on dialysis then plan for  coronary artery imaging to assess for systolic heart failure decompensation. States having acute increase in LE edema, orthopnea, and new onset cough in setting of not having his correct dialysate for PD. Some reported pink sputum while coughing yesterday. Weight elevated to 208lbs. VSS on 2L [place] [address]. CXR notable for pulmonary edema. Consistent with HF exacerbation 2/2 volume overload. Unlikely 2/2 repeat ischemia as very mild trop elevation in setting of ESRD and no chest pain reported. Will plan to consult nephrology for PD tonight and plan for aggressive diuresis.   -increase bumex to 6mg TID ,metolazone 2.5mg every other day (first dose tomorrow)  -nephrology consult, appreciate recs  -continue PTA hydralazine 50mg TID, imdur 60mg daily, losartan 100mg daily, atorvastatin 40mg  -will decrease metoprolol XL to 50mg  -oxygen as needed to keep O2 sat>92%  -tessalon for cough  -BMP in AM    #Troponinemia  Patient with slight elevation of troponin to 0.165 in setting of ESRD on PD. This likely is 2/2 his gross volume overload and some slight demand ischemia. EKG reassuring. Will plan to trend until downtrend.  -repeat troponin  -will assess trend    #ESRD on PD  #Macrocytic anemia  Patient with ESRD on PD 2/2 HTN and DM now on PD. Has known complications of mild anemia, acid base disturbance on NaHCO3, hypocalcemia improved with calcitriol. Appears to have had issue with obtaining his proper dialysate which has prompted him to be unable to take appropriate volume with his PD. Cr today 7.11. Will plan to consult nephrology for dialysis and continue PTA meds   -nephrology consult, appreciate recs  -continue PTA calcitriol, NaHCO3  -will hold glipizide  -CBC in AM    #Gout  Patient with known gout, seen by rheumatology on 8/18/17 with plan to increase allopurinol to 400mg daily and use prednisone 5mg daily for flare ppx  -continue allopurinol, prednisone    #T2DM  -will hold home glipizide  -med  SSI    #GERD  -continue PTA omeprazole    #HTN  -as noted above, continue antihypertensives with only change being decrease of Toprol XL    Diet: Renal Diet (non-dialysis)  Fluids: None  DVT Prophylaxis: Low Risk/Ambulatory with no VTE prophylaxis indicated  Code Status: Full Code    Disposition Plan   Expected discharge: Tomorrow; recommended to prior living arrangement once volume status improved.     Entered: Marilyn Rosario 11/13/2017, 11:17 PM   Information in the above section will display in the discharge planner report.    The patient was discussed with Dr. Paul Rosario  [facility]   Pager: [phone]  Please see sticky note for cross cover information      Data   Data     Recent Labs  Lab 11/13/17  1709   WBC 9.6   HGB 8.9*   *         POTASSIUM 4.4   CHLORIDE 106   CO2 28   BUN 81*   CR 7.11*   ANIONGAP 9   TRINI 8.3*   *   ALBUMIN 2.3*   PROTTOTAL 6.0*   BILITOTAL 0.8   ALKPHOS 104   ALT 26   AST 22   TROPI 0.165*     Recent Results (from the past 24 hour(s))   POC US ECHO LIMITED    Impression    Limited Bedside Cardiac Ultrasound, performed and interpreted by me.   Indication: Shortness of Breath.  Parasternal long axis, parasternal short axis, apical 4 chamber and subcostal views were acquired.   Image quality was satisfactory.    Findings:    Abnormal decreased global function, no pericardial effusion    IMPRESSION: Abnormal limited cardiac ultrasound showing decreased global function.  No pericardial effusion.   XR Chest 2 Views    Narrative    XR CHEST 2 VW  11/13/2017 7:57 PM      HISTORY: increasing SOB, eval for pulm edema;     COMPARISON: 6/26/2017    FINDINGS: PA and lateral views of the chest. Cardiomegaly with  perihilar interstitial opacities. No pneumothorax. No significant  pleural fluid.      Impression    IMPRESSION: Cardiomegaly with moderate pulmonary edema.    I have personally reviewed the examination and  initial interpretation  and I agree with the findings.    BAILEY JEFFERSON MD   XR Abdomen 2 Views    Narrative    XR ABDOMEN 2 VW  11/13/2017 9:28 PM      HISTORY: eval for constipation/impaction in the setting of peritoneal  dialysis;     COMPARISON: 7/7/2017, same-day chest x-ray    FINDINGS: Supine and upright frontal views of the abdomen. Mildly  distended stomach. Otherwise nonobstructive bowel. Peritoneal dialysis  catheter with tip in the pelvis. Mild colonic stool burden. Bibasilar  opacities interstitial opacities.      Impression    IMPRESSION:   1. Prominent gastric bubble with an otherwise nonobstructive bowel gas  appearance.  2. Bibasilar interstitial opacities concerning for pulmonary edema.    I have personally reviewed the examination and initial interpretation  and I agree with the findings.    BAILEY JEFFERSON MD

## 2017-11-14 NOTE — PROVIDER NOTIFICATION
Per pt request/ Needs HS meds: Aspirin, omeprazole and K?(4.4 currently). PRN Nebulizer for SOB ? Please advise. Team Paged

## 2017-11-14 NOTE — PROVIDER NOTIFICATION
"Intern text paged RE: \"6D 518-2 E.H. Pt's troponin increased. Do you want him on tele? Thanks! Rodrigo RN [phone]\"    Page returned. Pt does not need to be placed on tele. Provider notified that pt was not weight post-dialysis, nurse to take weight now.   "

## 2017-11-14 NOTE — PROVIDER NOTIFICATION
Pt would like to speak to care team/ has questions. Cough worsening. Requesting antitussive. Please advise.Team paged

## 2017-11-14 NOTE — DISCHARGE SUMMARY
Medicine Discharge Summary  Murray Nicholson MRN: [id number]  1955  Primary care provider: Yahir Turcios  ___________________________________          Date of Admission:  11/13/2017  Date of Discharge:  11/15/2017   Admitting Physician:  Breanne Miller MD  Discharge Physician:  Christopher Murray,   Discharging Service:  Internal Medicine, [place] [address]     Primary Provider: Yahir Turcios         Reason for Admission:   Cough, shortness of breath    From H&P:  Murray Nicholson is a 62 year old male with PMH notable for CHF 2/2 ICM w last EF 20-25%, ESRD on PD, T2DM, and HTN who presents with shortness of breath for a few days.      The patient states he does peritoneal dialysis but has been unable to complete full runs 2/2 not being able to get his red dialysate due to lack of supplies from the company. He states that yesterday while cycling he began to feel short of breath, which caused him to cease his run. At [facility] told aurea to present to the ED for evaluation. He also endorses some LE edema that is worsened from baseline, a dry cough, orthopnea, and an 18 lb weight gain. The patient also is reporting increased swelling of his lower extremities. States he normally has a small amount of this but is able to see his ankles at home at baseline. Says that he can tell he is accumulating fluid when he is unable to see his ankles particularly.          Discharge Diagnosis:   1. Acute on chronic systolic heart failure exacerbation  2. ESRD on peritoneal dialysis  3. Type 2 Demand ischemic secondary to volume overload         Procedures & Significant Findings:     11/13/17 Chest Xray:  FINDINGS: PA and lateral views of the chest. Cardiomegaly with  perihilar interstitial opacities. No pneumothorax. No significant  pleural fluid.   IMPRESSION: Cardiomegaly with moderate pulmonary edema.         Consultations:   Nephrology          "Hospital Course by Problem:      1. Acute on chronic systolic heart failure exacerbation  Known CHF with EF 20-25%. Goal dry weight is ~ 185 lbs. Patient started having increase in LE edema, orthopnea, cough due to not having correct dialysate at home for peritoneal dialysis. CXR, BNP, exam all compatible with pulmonary edema. Had a mild troponin elevated consistent with demand ichemia. Nephrology was consulted to PD overnight with aggressive diuresis. Addtionally, his home bumetanide was increased to 6mg TID and metolazone 2.5mg every other day was added. Patient was able to be weaned off of oxygen prior to discharge and will resume his prior to admission diuretic regimen.     2. Hypertension  Continued on PTA hydralazine, imdur, losartan, metoprolol.    3. ESRD on Peritoneal dialysis  Reports that he had an issue obtaining proper dialysate which led him unable to diuresis appropriate amount at home. Diuresed effectively while in the hospital.     Physical Exam on day of Discharge:  Blood pressure 132/77, pulse 98, temperature 99.4  F (37.4  C), temperature source Oral, resp. rate 16, height 1.753 m (5' 9\"), weight 87.6 kg (193 lb 3.2 oz), SpO2 100 %.  General: well appearing man in no acute distress  Respiratory: clear to auscultation bilaterally  Heart/CV: RRR, no murmurs rubs or gallops  Abdomen/GI: soft, nontender, nondistended  Extremities/MSK: warm and well perfused    Lines/Tubes:  PD Catheter         Pending Results:   none         Discharge Medications:     Discharge Medication List as of 11/15/2017 11:13 AM      CONTINUE these medications which have NOT CHANGED    Details   bumetanide (BUMEX) 2 MG tablet Take 3 tablets (6 mg) by mouth 2 times daily, Disp-180 tablet, R-3, E-Prescribe      amLODIPine (NORVASC) 5 MG tablet Take 1 tablet (5 mg) by mouth daily, Disp-90 tablet, R-0, E-Prescribe      sodium bicarbonate 650 MG tablet TAKE 2 TABLETS BY MOUTH THREE TIMES DAILY, Disp-180 tablet, R-0, E-Prescribe    "   glipiZIDE (GLUCOTROL) 5 MG tablet Take 1 tablet by mouth. TAKE 1 TABLET BY MOUTH DAILY BEFORE A MEAL, Disp-90 tablet, R-0, E-Prescribe      !! allopurinol (ZYLOPRIM) 300 MG tablet Take 1 tablet (300 mg) by mouth daily Take with 100 mg tabs for 400 mg /day total., Disp-30 tablet, R-2, E-Prescribe      !! allopurinol (ZYLOPRIM) 100 MG tablet Take 1 tablet (100 mg) by mouth daily Take with 300 mg tabs for 400 mg /day total., Disp-30 tablet, R-2, E-Prescribe      predniSONE (DELTASONE) 5 MG tablet Take 1 tablet (5 mg) by mouth daily, Disp-90 tablet, R-1, E-Prescribe      potassium chloride SA (K-DUR/KLOR-CON M) 20 MEQ CR tablet TAKE 2 TABLETS BY MOUTH EVERY MORNING AND 1 TABLET BY MOUTH IN THE EVENING, Disp-270 tablet, R-1, E-Prescribe      losartan (COZAAR) 100 MG tablet TAKE 1 TABLET(100 MG) BY MOUTH DAILY, Disp-90 tablet, R-0, E-PrescribeLast refill needs visit      ferrous sulfate (IRON) 325 (65 FE) MG tablet Take 1 tablet (325 mg) by mouth, Disp-200 tablet, R-3, JOJO, HistoricalPer anemia protocol with Brice Caraballo MD/Zelda Rich,PharmD [phone]      metolazone (ZAROXOLYN) 2.5 MG tablet Take 1 tablet (2.5 mg) by mouth every other day, Disp-90 tablet, R-1, E-Prescribe      calcitRIOL (ROCALTROL) 0.25 MCG capsule Take 1 capsule (0.25 mcg) by mouth daily, Disp-90 capsule, R-0, E-Prescribe      atorvastatin (LIPITOR) 40 MG tablet Take 1 tablet (40 mg) by mouth daily, Disp-90 tablet, R-3, E-Prescribe      isosorbide mononitrate (IMDUR) 60 MG 24 hr tablet Take 1 tablet (60 mg) by mouth daily, Disp-90 tablet, R-3, E-Prescribe      metoprolol (TOPROL XL) 100 MG 24 hr tablet Take 1 tablet (100 mg) by mouth daily, Disp-90 tablet, R-3, E-Prescribe      hydrALAZINE (APRESOLINE) 50 MG tablet Take 1 tablet (50 mg) by mouth 3 times daily, Disp-270 tablet, R-3, E-Prescribe      omeprazole (PRILOSEC) 20 MG CR capsule Take 20 mg by mouth daily At night, Historical      albuterol (PROAIR HFA/PROVENTIL HFA/VENTOLIN HFA) 108 (90  BASE) MCG/ACT Inhaler Inhale 2 puffs into the lungs every 6 hours as needed for shortness of breath / dyspnea or wheezing, Disp-1 Inhaler, R-0, E-Prescribe      loratadine (CLARITIN) 10 MG tablet Take 10 mg by mouth daily as needed Reported on 5/3/2017, Historical      ASPIRIN 81 MG OR TABS Take 1 tablet (81 mg) by mouth at bedtime, Historical       !! - Potential duplicate medications found. Please discuss with provider.               Discharge Instructions and Follow-Up:     Discharge Procedure Orders  Basic metabolic panel   Standing Status: Future  Standing Exp. Date: 01/14/18     Reason for your hospital stay   Order Comments: You were hospitalized for a CHF exacerbation due to not being able to complete your PD at home. Your symptoms improved after we were able to do a couple runs of dialysis and reduced your weight and volume.     Activity   Order Comments: Your activity upon discharge: activity as tolerated   Order Specific Question Answer Comments   Is discharge order? Yes      Discharge Instructions   Order Comments: Follow up with your PCP within a week for post-hospital follow-up and get the following labs/tests: BMP.     Follow Up and recommended labs and tests   Order Comments: Follow up with primary care provider, Yahir Turcios, within 7 days for hospital follow-up.  The following labs/tests are recommended: BMP.     Full Code     Diet   Order Comments: Follow this diet upon discharge: Orders Placed This Encounter     Fluid restriction 1800 ML FLUID     2 Gram Sodium Diet   Order Specific Question Answer Comments   Is discharge order? Yes                Discharge Disposition:   Home         Condition on Discharge:   Discharge condition: Good   Code status on discharge: Full Code        Date of service: 11/15/2017    I, Christopher Murray, saw and evaluated this patient prior to discharge.  I personally reviewed vital signs, medications, labs and imaging.    I personally spent >35 minutes on  discharge activities.    Christopher Murray MD  Internal Medicine

## 2017-11-14 NOTE — PLAN OF CARE
Problem: Patient Care Overview  Goal: Plan of Care/Patient Progress Review  Outpatient/Observation goals to be met before discharge home:  -Diagnostic tests and consults completed and resulted : No   -Vital signs normal or at patient baseline: No.  -Dyspnea improved and O2 sats greater than 88% on room air or prior home oxygen levels : No. On 3-5LPM O2/ Oxymask. Sat% 95%   Cough relieved with Inhaler and Tessalon Luisa. SOB improving with supplemental O2. Peritoneal dialysis cycling.Will continue to monitor.

## 2017-11-14 NOTE — PROGRESS NOTES
PERITONEAL DIALYSIS CYCLER TREATMENT NOTE      Date:  11/14/2017  Time:  12:00 AM    Data:   Treatment Ultrafiltrate Volume mL:     Total Duration of Therapy: 8   Fill Volume: 2000 mL  Heater Bag: Volume 2L and Dextrose Concentration 4.25% with 2.5 israel  Side Bag(s): Volume 2L  and Dextrose Concentration 4.25% with 2.5 israel  Total Number of Cycles: 4   Post Weight after Last Fill: 94.3kg    Assessment:   PD Patient Response: PD exit site CDI. No complication. Dressing changed      Interventions:   Comments:   PD cycler machine set by this RN and initiated Pd treatment. All needed supplies are available at bed side. Patient will disconnect himself in the morning.  Hand off report given to 6D RN     Plan:     Next PD treatment per renal team.

## 2017-11-15 VITALS
HEIGHT: 69 IN | HEART RATE: 98 BPM | RESPIRATION RATE: 16 BRPM | WEIGHT: 193.2 LBS | OXYGEN SATURATION: 100 % | TEMPERATURE: 99.4 F | BODY MASS INDEX: 28.61 KG/M2 | SYSTOLIC BLOOD PRESSURE: 132 MMHG | DIASTOLIC BLOOD PRESSURE: 77 MMHG

## 2017-11-15 LAB
ANION GAP SERPL CALCULATED.3IONS-SCNC: 10 MMOL/L (ref 3–14)
BUN SERPL-MCNC: 69 MG/DL (ref 7–30)
CALCIUM SERPL-MCNC: 8.9 MG/DL (ref 8.5–10.1)
CHLORIDE SERPL-SCNC: 103 MMOL/L (ref 94–109)
CO2 SERPL-SCNC: 30 MMOL/L (ref 20–32)
CREAT SERPL-MCNC: 6.98 MG/DL (ref 0.66–1.25)
GFR SERPL CREATININE-BSD FRML MDRD: 8 ML/MIN/1.7M2
GLUCOSE BLDC GLUCOMTR-MCNC: 194 MG/DL (ref 70–99)
GLUCOSE BLDC GLUCOMTR-MCNC: 233 MG/DL (ref 70–99)
GLUCOSE SERPL-MCNC: 294 MG/DL (ref 70–99)
MAGNESIUM SERPL-MCNC: 1.9 MG/DL (ref 1.6–2.3)
PHOSPHATE SERPL-MCNC: 5 MG/DL (ref 2.5–4.5)
POTASSIUM SERPL-SCNC: 3.2 MMOL/L (ref 3.4–5.3)
SODIUM SERPL-SCNC: 144 MMOL/L (ref 133–144)

## 2017-11-15 PROCEDURE — 25000132 ZZH RX MED GY IP 250 OP 250 PS 637: Performed by: INTERNAL MEDICINE

## 2017-11-15 PROCEDURE — 25000132 ZZH RX MED GY IP 250 OP 250 PS 637: Performed by: STUDENT IN AN ORGANIZED HEALTH CARE EDUCATION/TRAINING PROGRAM

## 2017-11-15 PROCEDURE — 96372 THER/PROPH/DIAG INJ SC/IM: CPT

## 2017-11-15 PROCEDURE — 36415 COLL VENOUS BLD VENIPUNCTURE: CPT | Performed by: STUDENT IN AN ORGANIZED HEALTH CARE EDUCATION/TRAINING PROGRAM

## 2017-11-15 PROCEDURE — G0378 HOSPITAL OBSERVATION PER HR: HCPCS

## 2017-11-15 PROCEDURE — 25000125 ZZHC RX 250: Performed by: STUDENT IN AN ORGANIZED HEALTH CARE EDUCATION/TRAINING PROGRAM

## 2017-11-15 PROCEDURE — 80048 BASIC METABOLIC PNL TOTAL CA: CPT | Performed by: STUDENT IN AN ORGANIZED HEALTH CARE EDUCATION/TRAINING PROGRAM

## 2017-11-15 PROCEDURE — 99217 ZZC OBSERVATION CARE DISCHARGE: CPT | Performed by: INTERNAL MEDICINE

## 2017-11-15 PROCEDURE — 84100 ASSAY OF PHOSPHORUS: CPT | Performed by: STUDENT IN AN ORGANIZED HEALTH CARE EDUCATION/TRAINING PROGRAM

## 2017-11-15 PROCEDURE — S0169 CALCITROL: HCPCS | Performed by: STUDENT IN AN ORGANIZED HEALTH CARE EDUCATION/TRAINING PROGRAM

## 2017-11-15 PROCEDURE — 83735 ASSAY OF MAGNESIUM: CPT | Performed by: STUDENT IN AN ORGANIZED HEALTH CARE EDUCATION/TRAINING PROGRAM

## 2017-11-15 PROCEDURE — 40000107 ZZH STATISTIC NURSE TIME, PER 15 MIN

## 2017-11-15 PROCEDURE — 00000146 ZZHCL STATISTIC GLUCOSE BY METER IP

## 2017-11-15 RX ORDER — POTASSIUM CHLORIDE 750 MG/1
40 TABLET, EXTENDED RELEASE ORAL ONCE
Status: COMPLETED | OUTPATIENT
Start: 2017-11-15 | End: 2017-11-15

## 2017-11-15 RX ORDER — POTASSIUM CHLORIDE 1.5 G/1.58G
40 POWDER, FOR SOLUTION ORAL ONCE
Status: COMPLETED | OUTPATIENT
Start: 2017-11-15 | End: 2017-11-15

## 2017-11-15 RX ADMIN — ALLOPURINOL 400 MG: 100 TABLET ORAL at 08:58

## 2017-11-15 RX ADMIN — METOPROLOL SUCCINATE 50 MG: 25 TABLET, EXTENDED RELEASE ORAL at 08:58

## 2017-11-15 RX ADMIN — LOSARTAN POTASSIUM 100 MG: 100 TABLET, FILM COATED ORAL at 08:58

## 2017-11-15 RX ADMIN — GENTAMICIN SULFATE: 1 CREAM TOPICAL at 08:59

## 2017-11-15 RX ADMIN — SODIUM BICARBONATE 650 MG TABLET 650 MG: at 08:58

## 2017-11-15 RX ADMIN — POTASSIUM CHLORIDE 40 MEQ: 750 TABLET, EXTENDED RELEASE ORAL at 11:16

## 2017-11-15 RX ADMIN — INSULIN ASPART 2 UNITS: 100 INJECTION, SOLUTION INTRAVENOUS; SUBCUTANEOUS at 08:57

## 2017-11-15 RX ADMIN — HYDRALAZINE HYDROCHLORIDE 50 MG: 25 TABLET ORAL at 08:59

## 2017-11-15 RX ADMIN — BUMETANIDE 6 MG: 2 TABLET ORAL at 09:48

## 2017-11-15 RX ADMIN — AMLODIPINE BESYLATE 5 MG: 2.5 TABLET ORAL at 08:58

## 2017-11-15 RX ADMIN — PREDNISONE 5 MG: 5 TABLET ORAL at 08:59

## 2017-11-15 RX ADMIN — ATORVASTATIN CALCIUM 40 MG: 40 TABLET, FILM COATED ORAL at 08:58

## 2017-11-15 RX ADMIN — CALCITRIOL 0.25 MCG: 0.25 CAPSULE, LIQUID FILLED ORAL at 08:58

## 2017-11-15 RX ADMIN — ISOSORBIDE MONONITRATE 60 MG: 60 TABLET, EXTENDED RELEASE ORAL at 08:58

## 2017-11-15 ASSESSMENT — PAIN DESCRIPTION - DESCRIPTORS: DESCRIPTORS: CRAMPING

## 2017-11-15 NOTE — PLAN OF CARE
"Problem: Patient Care Overview  Goal: Discharge Needs Assessment  Outpatient/Observation goals to be met before discharge home:  /77 (BP Location: Left arm)  Pulse 98  Temp 99.4  F (37.4  C) (Oral)  Resp 16  Ht 1.753 m (5' 9\")  Wt 87.6 kg (193 lb 3.2 oz)  SpO2 100%  BMI 28.53 kg/m2    -diagnostic tests and consults completed and resulted-YES  -vital signs normal or at patient baseline-YES  -dyspnea improved and O2 sats greater than 88% on room air or prior home oxygen levels-YES        "

## 2017-11-15 NOTE — PLAN OF CARE
Problem: Patient Care Overview  Goal: Plan of Care/Patient Progress Review  Outpatient/Observation goals to be met before discharge home:  -Diagnostic tests and consults completed and resulted : No   -Vital signs normal or at patient baseline: YES  -Dyspnea improved and O2 sats greater than 88% on room air or prior home oxygen levels : YES     Pt resting comfortably. Continues to deny SOB and >95% on RA. Pt will connect himself to peritoneal dialysis.

## 2017-11-15 NOTE — PLAN OF CARE
Problem: Patient Care Overview  Goal: Plan of Care/Patient Progress Review  Outpatient/Observation goals to be met before discharge home:  -Diagnostic tests and consults completed and resulted : NO  -Vital signs normal or at patient baseline: YES  -Dyspnea improved and O2 sats greater than 88% on room air or prior home oxygen levels : YES      Pt tolerating RA >95%. Will continue to monitor.

## 2017-11-15 NOTE — PLAN OF CARE
"Problem: Patient Care Overview  Goal: Plan of Care/Patient Progress Review  Outpatient/Observation goals to be met before discharge home:  -Diagnostic tests and consults completed and resulted : No   -Vital signs normal or at patient baseline: Yes  -Dyspnea improved and O2 sats greater than 88% on room air or prior home oxygen levels :Yes. Sat>88% on RA when awake. Trending down when sleeping.Kept on 1 LPM O2/ cannula. Sat% ~98%.   Pt denies SOB, occasional cough greatly improved since admission . Peritoneal dialysis cycling until~8am. No c/o of pain. Will continue plan of care.  /77 (BP Location: Left arm)  Pulse 98  Temp 99.4  F (37.4  C) (Oral)  Resp 16  Ht 1.753 m (5' 9\")  Wt 90.3 kg (199 lb)  SpO2 100%  BMI 29.39 kg/m2          "

## 2017-11-15 NOTE — DISCHARGE SUMMARY
Dialysis Discharge Summary Brief    [facility]  Division of Nephrology  Nephrology Discharge Dialysis Orders  Ph: [phone]  Fax: [phone]    Murray Nicholson  MRN: [id number]  YOB: 1955    [facility]: [facility]  Primary Nephrologist: Dr Mcpherson    Date of Admission: 11/13/2017  Date of Discharge: 11/15/2017  Discharge Diagnosis:  Tracy Nicholson was admitted with severe hypervolemia sec to increased salt and fluid intake about a week back and he had started to cut his PO intake and use 4.25% dextrose conc for his PD but he ran out of the bags and over the last week has gained 18 more pounds and presented to the hospital with pulm edema and volume overload  He had been getting 600-900 UF on the 2.5% dextrose at home which is adequate for him unless he had more volume to remove. He was started on PD with 4.25% soln with good UF, he also has been making urine from 5556-0666 /day. He is discharged to follow with Dr Mcpherson tomorrow. He was advised to continue his prescription with the 4.25% bags for tonight and follow with Dr Mcpherson for instruction there after. He has been compliant with his bumex and has been taking potassium supplements at home.   Inpatient prescription:   Total Duration of Therapy: 8 hrs              Fill Volume: 2000 mL  Heater Bag: Volume 2L and Dextrose Concentration 4.25%, Ca 2.5  Side Bag(s): Volume 2L x 4  and Dextrose Concentration 4.25%, Ca 2.5  Total Number of Cycles: 4        ICD-10-CM    1. Acute on chronic systolic congestive heart failure (H) I50.23 Hepatitis B Surface Antibody     Hepatitis B surface antigen     Troponin I     Troponin I     Basic metabolic panel     CBC with platelets     Glucose by meter     Glucose by meter     Troponin I     Magnesium     Magnesium     Phosphorus     Phosphorus     Lactic acid level STAT     Glucose by meter     Glucose by meter     Glucose by meter     Glucose by meter     Glucose by meter      Glucose by meter     Basic metabolic panel     Magnesium     Phosphorus     Glucose by meter     Glucose by meter     Basic metabolic panel     Glucose by meter     Glucose by meter     CANCELED: Troponin I     CANCELED: Troponin I     CANCELED: Troponin I     CANCELED: Phosphorus     CANCELED: Magnesium     CANCELED: Troponin I   2. Other hypervolemia E87.79         Resume all previous dialysis orders with exception as noted below    New Orders (if not applicable put NA):  Estimated Dry Weight 86.5 kg   Dialysis Duration 8hrs   Dialysis Access PD   Antibiotics (dose per dialysis, end date) NA           Labs to be drawn at dialysis NA   Other major changes to dialysis prescription (e.g. Dialysate bath, heparin, blood flow rate, etc)   None   Medication changes (also fax the unit a copy of the discharge summary)         None     Name of physician completing this form: Madyson Calle MD, MD        11/15/17  I agree with this discharge summary by Dr Calle.

## 2017-11-15 NOTE — PROGRESS NOTES
PERITONEAL DIALYSIS CYCLER TREATMENT NOTE      Date:  11/14/2017  Time:  7:27 PM    Data:   Treatment Ultrafiltrate Volume mL:  UF last night 2047mL, TTV 8000mL   Total Duration of Therapy: 8 hrs   Fill Volume: 2000 mL  Heater Bag: Volume 2L and Dextrose Concentration 4.25%, Ca 2.5  Side Bag(s): Volume 2L x 4  and Dextrose Concentration 4.25%, Ca 2.5  Total Number of Cycles: 4       Assessment:   PD Patient Response:  Tolerating well. Dressing is CDI, catheter secured.    Interventions:   Comments:  3 minutes added time.  Cycler set with above prescription for this evening's therapy. Pt plans to self connect later this evening; all needed supplies available. Hand off given to RN.      Plan:     Next tx per renal team.

## 2017-11-15 NOTE — PLAN OF CARE
Problem: Patient Care Overview  Goal: Plan of Care/Patient Progress Review  Outpatient/Observation goals to be met before discharge home:  -Diagnostic tests and consults completed and resulted : NO  -Vital signs normal or at patient baseline: NO  -Dyspnea improved and O2 sats greater than 88% on room air or prior home oxygen levels : NO     Pt resting comfortably. Denies SOB or difficulty breathing.

## 2017-11-15 NOTE — PLAN OF CARE
Problem: Patient Care Overview  Goal: Plan of Care/Patient Progress Review  Outpatient/Observation goals to be met before discharge home:  -Diagnostic tests and consults completed and resulted : NO  -Vital signs normal or at patient baseline: NO  -Dyspnea improved and O2 sats greater than 88% on room air or prior home oxygen levels : NO     Pt is A&Ox4, VSS, denies pain. Pt c/o cramping of bilateral upper legs, relief with heat packs and provider aware. Pt remains on 5L oxyplus mask, will try to wean today. Pt ambulating independently, voiding spontaneously, and tolerating regular diet. Possible d/c tomorrow after overnight peritoneal dialysis.

## 2017-11-15 NOTE — PROGRESS NOTES
Patient ambulated in hallway around the unit. His O2 states stayed consistently around 98-99% on RA.

## 2017-11-15 NOTE — PROGRESS NOTES
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 Admitted with hypervolemia and pulm edema    ESRD on PD  Sec to DM nephropathy  EDW 86.5Kg  Current weight 87.6 was admitted with +9kgs and has had good UF with PD    Will use 4.25% soln tonight again for a better UF with 2000 fill volume , dwell time 99min   Will follow up with his nephrologist tomorrow for further directions   Advised to review plans for additional diuresis   Keep on daily PO fluid restriction of 1500cc  And 2gm sodium diet  Monitor IO and daily weights    Hypervolemia with pulm edema  UF plans as above and follow with Dr Mcpherson tomorrow  Mild improvement since yesterday    Electrolytes  Mild hypokalemia  May need to continue repletion if the continues with high UF prescription  Continue the PTA 60meq (40+20)    Bicarb 27    Anemia sec to ESRD  Hb is 9.1   Will monitor  On EPO every other wenesday 84719 units and PO ferrous sulfate    BMD     PTA meds calcitriol 0.25mcg    On transplant list since 2015      Recommendations were communicated to primary team     Seen and discussed with Dr. Shilpi Calle MD   [phone]    Interval History :   In the last 24 hours Murray Nicholson has mild improvement in his dyspnea  Review of Systems:   I reviewed the following systems:  GI: low  appetite. - nausea or vomiting or diarrhea.   Neuro:  - confusion  Constitutional:  - fever or chills  CV: + dyspnea or edema.  - chest pain.    Physical Exam:   I/O last 3 completed shifts:  In: 1760 [P.O.:1760]  Out: 4667 [Urine:2620; Other:2047]   /77 (BP Location: Left arm)  Pulse 98  Temp 99.4  F (37.4  C) (Oral)  Resp 16  Ht 1.753 m (5' 9\")  Wt 87.6 kg (193 lb 3.2 oz)  SpO2 100%  BMI 28.53 kg/m2     GENERAL APPEARANCE: sleepy and tired  EYES:  - scleral icterus, pupils equal  HENT: mouth without ulcers or " lesions  PULM: lungs wheezing and fine crepts to auscultation,  bilaterally, equal air movement, no clubbing  CV: regular rhythm, normal rate, no rub     -JVP +     -edema +   GI: soft, non tender, non distended  INTEGUMENT: no cyanosis, - rash  NEURO:  - asterixis   Access PD cathter    Labs:   All labs reviewed by me  Electrolytes/Renal -   Recent Labs   Lab Test  11/15/17   0758  11/14/17   0645  11/13/17   1709  08/02/17   1311   06/30/17   0555   NA  144  141  142  139   < >  142   POTASSIUM  3.2*  3.7  4.4  4.1   < >  3.3*   CHLORIDE  103  103  106  102   < >  103   CO2  30  27  28  22   < >  27   BUN  69*  76*  81*  137*   < >  133*   CR  6.98*  6.85*  7.11*  6.74*   < >  5.69*   GLC  294*  312*  231*  139*   < >  137*   TRINI  8.9  8.9  8.3*  8.8   < >  8.9   MAG  1.9  1.8   --    --    --   2.2   PHOS  5.0*  4.3   --   4.8*   --    --     < > = values in this interval not displayed.       CBC -   Recent Labs   Lab Test  11/14/17   0645  11/13/17 1709 08/02/17   1311   WBC  10.1  9.6  7.1   HGB  9.1*  8.9*  10.4*   PLT  234  277  221       LFTs -   Recent Labs   Lab Test  11/13/17   1709  08/02/17   1311  07/05/17   1204   05/31/17   1111   ALKPHOS  104   --   98   --   97   BILITOTAL  0.8   --   0.5   --   0.5   ALT  26   --   15   --   16   AST  22   --   13   --   18   PROTTOTAL  6.0*   --   6.2*   --   6.8   ALBUMIN  2.3*  3.2*  2.7*   < >  2.7*    < > = values in this interval not displayed.       Iron Panel -   Recent Labs   Lab Test  07/19/17   1306  07/05/17   1204  06/21/17   1058   IRON  46  26*  69   IRONSAT  18  12*  29   ALBERTINA  369  542*  557*         Imaging:  All imaging studies reviewed by me.     Current Medications:    atorvastatin  40 mg Oral Daily     bumetanide  6 mg Oral TID     calcitRIOL  0.25 mcg Oral Daily     hydrALAZINE  50 mg Oral TID     isosorbide mononitrate  60 mg Oral Daily     losartan  100 mg Oral Daily     metolazone  2.5 mg Oral Every Other Day     allopurinol  400 mg  Oral Daily     predniSONE  5 mg Oral Daily     sodium bicarbonate  650 mg Oral TID     amLODIPine  5 mg Oral Daily     gentamicin   Topical Daily     metoprolol  50 mg Oral Daily     insulin aspart  1-7 Units Subcutaneous TID AC     insulin aspart  1-5 Units Subcutaneous At Bedtime     aspirin EC  81 mg Oral At Bedtime     omeprazole  20 mg Oral At Bedtime       peritoneal dialysis solutions ADULT Stopped (11/15/17 0830)     MD TIFFANY Lawson, Russel Dobbins, saw this patient with Dr. Calle and agree with the findings and plan of care as documented in the note.

## 2017-11-15 NOTE — PROGRESS NOTES
"/77 (BP Location: Left arm)  Pulse 98  Temp 99.4  F (37.4  C) (Oral)  Resp 16  Ht 1.753 m (5' 9\")  Wt 87.6 kg (193 lb 3.2 oz)  SpO2 100%  BMI 28.53 kg/m2  Patient's condition and vital Signs are stable/WNL.  Discharge instructions reviewed with patient and questions answered. Patient verbalizes understanding. IV removed. Pain under control.  Patient is tolerating 2Gram sodium diet and 1500CC fluid intake and denies any N/V. Patient to be discharged to home via son. Patient has all belongings.      "

## 2017-11-15 NOTE — PLAN OF CARE
Problem: Patient Care Overview  Goal: Plan of Care/Patient Progress Review  Outpatient/Observation goals to be met before discharge home:  -Diagnostic tests and consults completed and resulted : No   -Vital signs normal or at patient baseline: Yes  -Dyspnea improved and O2 sats greater than 88% on room air or prior home oxygen levels :Yes. Sat>88% on RA when awake. Trending down when sleeping On 1 LPM O2/ cannula. Sat% ~98%   reports relief from cough and denies SOB. Peritoneal dialysis cycling until~8am .Denies pain.

## 2017-11-16 ENCOUNTER — CARE COORDINATION (OUTPATIENT)
Dept: CARE COORDINATION | Facility: CLINIC | Age: 62
End: 2017-11-16

## 2017-11-16 ENCOUNTER — TELEPHONE (OUTPATIENT)
Dept: FAMILY MEDICINE | Facility: CLINIC | Age: 62
End: 2017-11-16

## 2017-11-16 DIAGNOSIS — E87.6 HYPOKALEMIA: Primary | ICD-10-CM

## 2017-11-16 DIAGNOSIS — I50.22 CHRONIC SYSTOLIC CONGESTIVE HEART FAILURE (H): ICD-10-CM

## 2017-11-16 RX ORDER — SPIRONOLACTONE 100 MG/1
50 TABLET, FILM COATED ORAL DAILY
Qty: 30 TABLET | Refills: 11 | Status: SHIPPED | OUTPATIENT
Start: 2017-11-16 | End: 2018-01-29

## 2017-11-16 NOTE — LETTER
Health Care Home - Access Care Plan    About Me  Patient Name:  Murray Mcneil    YOB: 1955  Age:                            62 year old   Hu MRN:         [id number] Telephone Information:     Home Phone [phone]   Mobile [phone]       Address:    [address] [place] [address] Email address:  [email or web address]      Emergency Contact(s)  Name Relationship Lgl Grd Work Phone Home Phone Mobile Phone   1. GENOVEVA MCNEIL* Son   [phone]   2. TYLER COMBS Friend   [phone]   3. ABDELRAHMAN MCNEIL* Son   [phone]             Health Maintenance: Routine Health maintenance Reviewed: Due/Overdue     My Access Plan  Medical Emergency 911   Questions or concerns during clinic hours Primary Clinic Line, I will call the clinic directly: Primary Clinic: Critical access hospital- [phone]   24 Hour Appointment Line [phone] or  1-742 [place] [phone])  (toll free)   24 Hour Nurse Line [phone] (toll free)   Questions or concerns outside clinic hours 24 Hour Appointment Line, I will call the after-hours on-call line:   [facility] [phone] or [phone] ([phone]) (toll-free)   Preferred Urgent Care Preferred Urgent Care: [facility], [phone]   Preferred Hospital Preferred Hospital: [facility][note 3]  [phone]   Preferred Pharmacy [facility] - A MAIL ORDER PHMACY     Behavioral Health Crisis Line The National Suicide Prevention Lifeline at 1-331.766.7904 or 248     My Care Team Members  Patient Care Team       Relationship Specialty Notifications Start End    Yahir Turcios MD PCP - General   12/17/02     Phone: [phone] Fax: [phone]         [address] [facility] [address]    Brice Caraballo MD MD Nephrology  5/1/15     Phone: [phone] Fax: [phone]         [address] [address]  [facility] [address]    Arcelia Blum MD MD Cardiology  5/4/15     Phone: [phone] Fax: [phone]         [address] [facility] [address] [facility] [address]    Bobby Mcconnell MD MD Surgery  6/29/15     Phone: [phone] Fax: [phone]         [address] [facility] [address] [facility] [address]    Yahir Turcios MD MD Family Practice  8/7/15     Phone: [phone] Fax: [phone]         [address] [facility] [facility] [address]    Montrell Posada MD MD Cardiology  12/2/15     Phone: [phone] Fax: [phone]         [address] [facility] [address] [place] [address]    Armida Arias RN Nurse Coordinator Cardiology Admissions 4/26/17     Pager: [phone]          [facility] [[id number] Cardiac Cath Lab [address]    Edith Pacheco RN Nurse Coordinator Cardiology Admissions 7/4/17     Comment:  [facility] Nurse Coordinator    Phone: [phone] Pager: [phone]        Janett Martini, LEWIS Nurse Practitioner Cardiology Admissions 7/4/17     Comment:  [facility]    Phone: [phone] Pager: [phone] Fax: [phone]        [address] [place] [address]    Goldie Omer RN Nurse Coordinator Cardiology Admissions 7/25/17     Comment:  [facility] Nurse Coordinator    Phone: [phone] Pager: [phone] Fax: [phone]       Kristi Ayon NP Nurse Practitioner Cardiology Admissions 7/25/17     Comment:  [facility]    Phone: [phone] Pager: [phone] Fax: [phone]        [address] [address]    Kyleigh Long RN Clinic Care Coordinator Nurse Admissions 11/17/17     Phone: [phone] Fax: [phone]            My Medical and Care Information  Problem List   Patient Active Problem List   Diagnosis     Esophageal reflux     Hypersomnia with sleep apnea     Allergic rhinitis     CHF (congestive heart failure) (H)     CKD (chronic kidney disease) stage 5, GFR less than 15 ml/min (H)     Hyperlipidemia  LDL goal <100     Hypertension goal BP (blood pressure) < 130/80     Hypertensive cardiopathy     Tubular adenoma     Anemia of chronic disease     Bicuspid aortic valve     CAD (coronary artery disease)     Anemia in chronic renal disease     Hypertension     Dyslipidemia     MGUS (monoclonal gammopathy of unknown significance)     Ascending aortic aneurysm (H)     Type 2 diabetes mellitus with diabetic chronic kidney disease (H)     Anemia, iron deficiency     Anemia in stage 5 chronic kidney disease (H)     Fluid overload     Chronic systolic heart failure (H)     Volume overload      Current Medications and Allergies:  See printed Medication Report

## 2017-11-16 NOTE — PROGRESS NOTES
Seen for PD clinic    He is hypokalemic and hypervolemic with h/o systolic heart failure.  He takes potassium 60 meq per day.  Will add spironolactone to help with hypervolemia and hypokalemia.  Stop metolazone    Potassium levels weekly - standing orders placed.    If BP <120 systolic d/c hydralazine    If potassium >4 then back off on potassium supplement    Ana Luisa Mcpherson MD  [facility]  Department of Medicine  Division of Renal Disease and Hypertension  [phone]

## 2017-11-16 NOTE — LETTER
[place] CARE COORDINATION  [facility] Critical access hospital [address]  Phone: [phone]      November 17, 2017      Murray Nicholson  [address]  [place] [address]    Dear Murray,  I am the Clinic Care Coordinator that works with your primary care provider's clinic. I have been trying to reach you recently to introduce Clinic Care Coordination and to see if there was anything I could assist you with.  Below is a description of what Clinic Care Coordination is and how I can further assist you.     The Clinic Care Coordinator role is a Registered Nurse and/or  who understands the health care system. The goal of Clinic Care Coordination is to help you manage your health and improve access to the [facility] system in the most efficient manner.  The Registered Nurse can assist you in meeting your health care goals by providing education, coordinating services, and strengthening the communication among your providers. The  can assist you with financial, behavioral, psychosocial, and chemical dependency and counseling/psychiatric resources.    Please feel free to keep this letter and contact information to contact me at [phone] with any further questions or concerns that may arise. We at [facility] are focused on providing you with the highest-quality healthcare experience possible and that all starts with you.       Sincerely,       Ryley Long    Enclosed: I have enclosed a copy of a 24 Hour Access Plan. This has helpful phone numbers for you to call when needed. Please keep this in an easy to access place to use as needed.

## 2017-11-16 NOTE — PROGRESS NOTES
Clinic Care Coordination Contact  [facility]/Voicemail    Referral Source: [facility]  Clinical Data: Care Coordinator Outreach  Outreach attempted x 1.  Left message on voicemail with call back information and requested return call.  Plan: Care Coordinator will try to reach patient again in 1-2 business days.

## 2017-11-16 NOTE — TELEPHONE ENCOUNTER
Please call for In Patient follow up  Chief Complaint: Acute On Chronic Systolic Congestive Heart Failure (H)

## 2017-11-16 NOTE — TELEPHONE ENCOUNTER
"Pt has appt 11/21 with Dr Turcios for hosp f/u. He was in hosp 11/13-15 for CHF. Pt does PD.      ED/Discharge Protocol    \"Hi, my name is Shauna Clemons, a registered nurse, and I am calling on behalf of Dr. Turcios's office at [place].  I am calling to follow up and see how things are going for you after your recent visit.\"    \"I see that you were in the (ER/UC/IP) on yesterday.    How are you doing now that you are home?\" \"I am doing fine\" Slept well. Slept horizontally  . Doing PD now.     Is patient experiencing symptoms that may require a hospital visit?  no    Discharge Instructions    \"Let's review your discharge instructions.  What is/are the follow-up recommendations?  Pt. Response: seeing nephrologist today and Dr Turcios next week    \"Were you instructed to make a follow-up appointment?\"  Pt. Response: Yes.  Has appointment been made?   Yes           Medications    \"How many new medications are you on since your hospitalization/ED visit?\"    0-1  \"How many of your current medicines changed (dose, timing, name, etc.) while you were in the hospital/ED visit?\"   0-1  \"Do you have questions about your medications?\"   No  \"Were you newly diagnosed with heart failure, COPD, diabetes or did you have a heart attack?\"   Was in for acute on chronic HF.       Medication reconciliation completed? No, due to no changes in meds    Was MTM referral placed (*Make sure to put transitions as reason for referral)?   No    Call Summary    \"Do you have any questions or concerns about your condition or care plan at the moment?\"    No  Triage nurse advice given: call back if questions before appt next week    Patient was in ER 2 in the past year (assess appropriateness of ER visits.)      \"If you have questions or things don't continue to improve, we encourage you contact us through the main clinic number,          Shauna Clemons, RN, BSN             "

## 2017-11-17 NOTE — PROGRESS NOTES
Clinic Care Coordination Contact  [facility]/Voicemail    Referral Source: [facility]  Clinical Data: Care Coordinator Outreach  Outreach attempted x 2.  Left message on voicemail with call back information and requested return call. Noted that pt spoke with triage RN yesterday, left message indicating that writer's focus if different and would like to work on the CHF.  Plan: Care Coordinator will mail out care coordination introduction letter with care coordinator contact information and explanation of care coordination services. Care Coordinator will try to reach patient again in 3-5 business days.

## 2017-11-21 ENCOUNTER — OFFICE VISIT (OUTPATIENT)
Dept: FAMILY MEDICINE | Facility: CLINIC | Age: 62
End: 2017-11-21
Payer: COMMERCIAL

## 2017-11-21 VITALS
TEMPERATURE: 97.4 F | SYSTOLIC BLOOD PRESSURE: 146 MMHG | HEART RATE: 94 BPM | BODY MASS INDEX: 28.74 KG/M2 | DIASTOLIC BLOOD PRESSURE: 74 MMHG | WEIGHT: 194.6 LBS | RESPIRATION RATE: 14 BRPM | OXYGEN SATURATION: 97 %

## 2017-11-21 DIAGNOSIS — E87.6 HYPOKALEMIA: ICD-10-CM

## 2017-11-21 DIAGNOSIS — N18.5 ANEMIA IN STAGE 5 CHRONIC KIDNEY DISEASE (H): Primary | ICD-10-CM

## 2017-11-21 DIAGNOSIS — D63.1 ANEMIA IN STAGE 5 CHRONIC KIDNEY DISEASE (H): Primary | ICD-10-CM

## 2017-11-21 DIAGNOSIS — R09.81 NASAL CONGESTION: ICD-10-CM

## 2017-11-21 DIAGNOSIS — R05.9 COUGH: ICD-10-CM

## 2017-11-21 LAB
ANION GAP SERPL CALCULATED.3IONS-SCNC: 11 MMOL/L (ref 3–14)
BUN SERPL-MCNC: 66 MG/DL (ref 7–30)
CALCIUM SERPL-MCNC: 8.9 MG/DL (ref 8.5–10.1)
CHLORIDE SERPL-SCNC: 102 MMOL/L (ref 94–109)
CO2 SERPL-SCNC: 29 MMOL/L (ref 20–32)
CREAT SERPL-MCNC: 7.65 MG/DL (ref 0.66–1.25)
GFR SERPL CREATININE-BSD FRML MDRD: 7 ML/MIN/1.7M2
GLUCOSE SERPL-MCNC: 189 MG/DL (ref 70–99)
MAGNESIUM SERPL-MCNC: 1.9 MG/DL (ref 1.6–2.3)
POTASSIUM SERPL-SCNC: 4.6 MMOL/L (ref 3.4–5.3)
SODIUM SERPL-SCNC: 142 MMOL/L (ref 133–144)

## 2017-11-21 PROCEDURE — 36415 COLL VENOUS BLD VENIPUNCTURE: CPT | Performed by: NURSE PRACTITIONER

## 2017-11-21 PROCEDURE — 99214 OFFICE O/P EST MOD 30 MIN: CPT | Performed by: FAMILY MEDICINE

## 2017-11-21 PROCEDURE — 80048 BASIC METABOLIC PNL TOTAL CA: CPT | Performed by: NURSE PRACTITIONER

## 2017-11-21 PROCEDURE — 83735 ASSAY OF MAGNESIUM: CPT | Performed by: NURSE PRACTITIONER

## 2017-11-21 RX ORDER — FLUTICASONE PROPIONATE 50 MCG
1-2 SPRAY, SUSPENSION (ML) NASAL DAILY
Qty: 16 G | Refills: 11 | Status: SHIPPED | OUTPATIENT
Start: 2017-11-21 | End: 2018-07-25

## 2017-11-21 RX ORDER — ALBUTEROL SULFATE 90 UG/1
2 AEROSOL, METERED RESPIRATORY (INHALATION) EVERY 6 HOURS PRN
Qty: 1 INHALER | Refills: 0 | Status: ON HOLD | OUTPATIENT
Start: 2017-11-21 | End: 2018-07-02

## 2017-11-21 NOTE — MR AVS SNAPSHOT
After Visit Summary   11/21/2017    Murray Nicholson    MRN: [id number]           Patient Information     Date Of Birth          1955        Visit Information        Provider Department      11/21/2017 11:40 AM Yahir Turcios MD [facility]        Today's Diagnoses     Anemia in stage 5 chronic kidney disease (H)    -  1    Hypokalemia        Cough        Nasal congestion           Follow-ups after your visit        Your next 10 appointments already scheduled     Dec 15, 2017  9:30 AM CST   (Arrive by 9:15 AM)   Return Visit with Darvin Tang MD   [facility] Rheumatology ([facility] [place])    [address] [facility] [address] [phone]              Who to contact     If you have questions or need follow up information about today's clinic visit or your schedule please contact [facility] directly at [phone].  Normal or non-critical lab and imaging results will be communicated to you by MyChart, letter or phone within 4 business days after the clinic has received the results. If you do not hear from us within 7 days, please contact the clinic through [employer] or phone. If you have a critical or abnormal lab result, we will notify you by phone as soon as possible.  Submit refill requests through [employer] or call your pharmacy and they will forward the refill request to us. Please allow 3 business days for your refill to be completed.          Additional Information About Your Visit        MyChart Information     [employer] gives you secure access to your electronic health record. If you see a primary care provider, you can also send messages to your care team and make appointments. If you have questions, please call your primary care clinic.  If you do not have a primary care provider, please call [phone] and they will assist you.        Care EveryWhere ID     This is your Care EveryWhere ID. This could be used  by other organizations to access your [facility] medical records  [id number]        Your Vitals Were     Pulse Temperature Respirations Pulse Oximetry BMI (Body Mass Index)       94 97.4  F (36.3  C) (Oral) 14 97% 28.74 kg/m2        Blood Pressure from Last 3 Encounters:   11/21/17 146/74   11/15/17 132/77   08/18/17 132/74    Weight from Last 3 Encounters:   11/21/17 194 lb 9.6 oz (88.3 kg)   11/15/17 193 lb 3.2 oz (87.6 kg)   08/18/17 197 lb (89.4 kg)              We Performed the Following     Basic metabolic panel     Magnesium          Today's Medication Changes          These changes are accurate as of: 11/21/17 11:59 PM.  If you have any questions, ask your nurse or doctor.               Start taking these medicines.        Dose/Directions    fluticasone 50 MCG/ACT spray   Commonly known as:  FLONASE   Used for:  Nasal congestion   Started by:  Yahir Turcios MD        Dose:  1-2 spray   Spray 1-2 sprays into both nostrils daily   Quantity:  16 g   Refills:  11            Where to get your medicines      These medications were sent to [id number] - [place], [place] - [address], [place] [address]     Phone:  [phone]     albuterol 108 (90 BASE) MCG/ACT Inhaler    fluticasone 50 MCG/ACT spray                Primary Care Provider Office Phone # Fax #    Yahir Turcios [phone] [address] [facility] [address] [address]        Equal Access to Services     [facility]: Hadii marsha wang hadasho Soomaali, waaxda luqadaha, qaybta kaalmada mignon, jose idimackenzie ayala. So [facility] [phone].    ATENCIÓN: Si habla español, tiene a ortiz disposición servicios gratuitos de asistencia lingüística. Llame al [phone].    We comply with applicable federal civil rights laws and [place] laws. We do not discriminate on the basis of race, color, national origin, age, disability, sex, sexual orientation, or  gender identity.            Thank you!     Thank you for choosing [facility]  for your care. Our goal is always to provide you with excellent care. Hearing back from our patients is one way we can continue to improve our services. Please take a few minutes to complete the written survey that you may receive in the mail after your visit with us. Thank you!             Your Updated Medication List - Protect others around you: Learn how to safely use, store and throw away your medicines at www.disposemymeds.org.          This list is accurate as of: 11/21/17 11:59 PM.  Always use your most recent med list.                   Brand Name Dispense Instructions for use Diagnosis    albuterol 108 (90 BASE) MCG/ACT Inhaler    PROAIR HFA/PROVENTIL HFA/VENTOLIN HFA    1 Inhaler    Inhale 2 puffs into the lungs every 6 hours as needed for shortness of breath / dyspnea or wheezing    Cough       * allopurinol 300 MG tablet    ZYLOPRIM    30 tablet    Take 1 tablet (300 mg) by mouth daily Take with 100 mg tabs for 400 mg /day total.    Chronic gout of wrist, unspecified cause, unspecified laterality, Gout, unspecified cause, unspecified chronicity, unspecified site       * allopurinol 100 MG tablet    ZYLOPRIM    30 tablet    Take 1 tablet (100 mg) by mouth daily Take with 300 mg tabs for 400 mg /day total.    Chronic gout of wrist, unspecified cause, unspecified laterality, Gout, unspecified cause, unspecified chronicity, unspecified site       amLODIPine 5 MG tablet    NORVASC    90 tablet    Take 1 tablet (5 mg) by mouth daily    Hypertension goal BP (blood pressure) < 130/80       aspirin 81 MG tablet      Take 1 tablet (81 mg) by mouth at bedtime        atorvastatin 40 MG tablet    LIPITOR    90 tablet    Take 1 tablet (40 mg) by mouth daily    CKD (chronic kidney disease) stage 3, GFR 30-59 ml/min, Hyperlipidemia LDL goal <100       bumetanide 2 MG tablet    BUMEX    180 tablet    Take 3 tablets (6 mg) by  mouth 2 times daily    Systolic congestive heart failure, unspecified congestive heart failure chronicity (H)       calcitRIOL 0.25 MCG capsule    ROCALTROL    90 capsule    Take 1 capsule (0.25 mcg) by mouth daily    Hypocalcemia       ferrous sulfate 325 (65 FE) MG tablet    IRON    200 tablet    Take 1 tablet (325 mg) by mouth    Anemia in stage 5 chronic kidney disease (H), CKD (chronic kidney disease) stage 5, GFR less than 15 ml/min (H)       fluticasone 50 MCG/ACT spray    FLONASE    16 g    Spray 1-2 sprays into both nostrils daily    Nasal congestion       glipiZIDE 5 MG tablet    GLUCOTROL    90 tablet    Take 1 tablet by mouth. TAKE 1 TABLET BY MOUTH DAILY BEFORE A MEAL    Type 2 diabetes mellitus with other specified complication (H)       hydrALAZINE 50 MG tablet    APRESOLINE    270 tablet    Take 1 tablet (50 mg) by mouth 3 times daily    Type 2 diabetes mellitus with stage 5 chronic kidney disease not on chronic dialysis, without long-term current use of insulin (H)       isosorbide mononitrate 60 MG 24 hr tablet    IMDUR    90 tablet    Take 1 tablet (60 mg) by mouth daily    Chronic systolic congestive heart failure (H)       loratadine 10 MG tablet    CLARITIN     Take 10 mg by mouth daily as needed Reported on 5/3/2017    Hypertensive cardiopathy, SOB (shortness of breath)       losartan 100 MG tablet    COZAAR    90 tablet    TAKE 1 TABLET(100 MG) BY MOUTH DAILY    Renal hypertension, stage 1-4 or unspecified chronic kidney disease       metoprolol 100 MG 24 hr tablet    TOPROL XL    90 tablet    Take 1 tablet (100 mg) by mouth daily    Chronic systolic congestive heart failure (H)       omeprazole 20 MG CR capsule    priLOSEC     Take 20 mg by mouth daily At night        potassium chloride SA 20 MEQ CR tablet    K-DUR/KLOR-CON M    270 tablet    TAKE 2 TABLETS BY MOUTH EVERY MORNING AND 1 TABLET BY MOUTH IN THE EVENING    Combined systolic and diastolic congestive heart failure, unspecified  congestive heart failure chronicity (H), Hypervolemia, unspecified hypervolemia type, Hospital discharge follow-up       predniSONE 5 MG tablet    DELTASONE    90 tablet    Take 1 tablet (5 mg) by mouth daily    Gout, unspecified cause, unspecified chronicity, unspecified site, Chronic gout of wrist, unspecified cause, unspecified laterality       spironolactone 100 MG tablet    ALDACTONE    30 tablet    Take 0.5 tablets (50 mg) by mouth daily    Hypokalemia, Chronic systolic congestive heart failure (H)       * Notice:  This list has 2 medication(s) that are the same as other medications prescribed for you. Read the directions carefully, and ask your doctor or other care provider to review them with you.

## 2017-11-21 NOTE — NURSING NOTE
"Chief Complaint   Patient presents with     Hospital F/U       Initial /74 (BP Location: Left arm, Patient Position: Sitting, Cuff Size: Adult Regular)  Pulse 94  Temp 97.4  F (36.3  C) (Oral)  Resp 14  Wt 194 lb 9.6 oz (88.3 kg)  SpO2 97%  BMI 28.74 kg/m2 Estimated body mass index is 28.74 kg/(m^2) as calculated from the following:    Height as of 11/13/17: 5' 9\" (1.753 m).    Weight as of this encounter: 194 lb 9.6 oz (88.3 kg).  Medication Reconciliation: complete     Ruben Benavidez MA      "
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
## 2017-11-21 NOTE — LETTER
November 21, 2017      Murray Nicholson  [address]  [place] [address]        To Whom It May Concern,      Murray has multiple complicated medical conditions. These certainly could affect his energy level at times making it more difficult to get to work on time.       Sincerely,      Yahir Turcios MD

## 2017-11-28 DIAGNOSIS — I12.9 RENAL HYPERTENSION, STAGE 1-4 OR UNSPECIFIED CHRONIC KIDNEY DISEASE: ICD-10-CM

## 2017-11-29 NOTE — PROGRESS NOTES
Clinic Care Coordination Contact  [facility]/Voicemail    Referral Source: [facility]  Clinical Data: Care Coordinator Outreach  Outreach attempted x 2.  Left message on voicemail with call back information and requested return call.  Plan: Care Coordinator mailed out care coordination introduction letter on 11/17 with no response. Care Coordinator will do no further outreaches at this time.

## 2017-12-01 RX ORDER — LOSARTAN POTASSIUM 100 MG/1
TABLET ORAL
Qty: 90 TABLET | Refills: 0 | Status: SHIPPED | OUTPATIENT
Start: 2017-12-01 | End: 2018-01-29

## 2017-12-01 NOTE — TELEPHONE ENCOUNTER
Blood pressure above range  Elevated creatinine    Requested Prescriptions   Pending Prescriptions Disp Refills     losartan (COZAAR) 100 MG tablet [Pharmacy Med Name: LOSARTAN 100MG TABLETS] 90 tablet 0     Sig: TAKE 1 TABLET BY MOUTH DAILY    Angiotensin-II Receptors Failed    12/1/2017 10:02 AM       Failed - Blood pressure under 140/90 in past 12 months.    BP Readings from Last 3 Encounters:   11/21/17 146/74   11/15/17 132/77   08/18/17 132/74                Failed - Normal serum creatinine on file in past 12 months    Recent Labs   Lab Test  11/21/17   1239   CR  7.65*            Passed - Recent or future visit with authorizing provider's specialty    Patient had office visit in the last year or has a visit in the next 30 days with authorizing provider.  See chart review.              Passed - Patient is age 18 or older       Passed - Normal serum potassium on file in past 12 months    Recent Labs   Lab Test  11/21/17   1239   POTASSIUM  4.6

## 2017-12-01 NOTE — TELEPHONE ENCOUNTER
Medication Detail      Disp Refills Start End JOJO   losartan (COZAAR) 100 MG tablet 90 tablet 0 7/31/2017  No   Sig: TAKE 1 TABLET(100 MG) BY MOUTH DAILY     LOV   11-21-17

## 2017-12-04 ENCOUNTER — TELEPHONE (OUTPATIENT)
Dept: TRANSPLANT | Facility: CLINIC | Age: 62
End: 2017-12-04

## 2017-12-04 DIAGNOSIS — M10.9 GOUT, UNSPECIFIED CAUSE, UNSPECIFIED CHRONICITY, UNSPECIFIED SITE: ICD-10-CM

## 2017-12-04 DIAGNOSIS — M1A.0390 CHRONIC GOUT OF WRIST, UNSPECIFIED CAUSE, UNSPECIFIED LATERALITY: ICD-10-CM

## 2017-12-04 NOTE — TELEPHONE ENCOUNTER
Coordinator called both pt and dialysis unit and left msg that he will be discussed at committee this wk. Letter was sent to pt on 10/30/17 stating pt had 30 days to contact coordinator to state is he was interested in transplanted or he would be considered for de-list.

## 2017-12-06 ENCOUNTER — MYC REFILL (OUTPATIENT)
Dept: FAMILY MEDICINE | Facility: CLINIC | Age: 62
End: 2017-12-06

## 2017-12-06 ENCOUNTER — TEAM CONFERENCE (OUTPATIENT)
Dept: TRANSPLANT | Facility: CLINIC | Age: 62
End: 2017-12-06

## 2017-12-06 DIAGNOSIS — I50.22 CHRONIC SYSTOLIC CONGESTIVE HEART FAILURE (H): ICD-10-CM

## 2017-12-06 DIAGNOSIS — I10 HYPERTENSION GOAL BP (BLOOD PRESSURE) < 130/80: ICD-10-CM

## 2017-12-06 RX ORDER — ALLOPURINOL 300 MG/1
300 TABLET ORAL DAILY
Qty: 30 TABLET | Refills: 2 | Status: SHIPPED | OUTPATIENT
Start: 2017-12-06 | End: 2018-03-16

## 2017-12-06 RX ORDER — ALLOPURINOL 100 MG/1
100 TABLET ORAL DAILY
Qty: 30 TABLET | Refills: 2 | Status: SHIPPED | OUTPATIENT
Start: 2017-12-06 | End: 2018-03-16

## 2017-12-06 NOTE — TELEPHONE ENCOUNTER
allopurinol (ZYLOPRIM) 100 & 300 MG  Last Written Prescription Date: 8/18/17  Last Fill Quantity: 30,   # refills: 2  Last Office Visit : 8/18/17  Future Office visit:  12/15/17    Routing refill request to provider for review/approval because:  REVIEW LABS

## 2017-12-06 NOTE — TELEPHONE ENCOUNTER
TEAM CONFERENCE:    ATTENDEES: Macie Arvizu Riad, Keys, Schumaker, Kaitlyn, Coordinators, Social Work, Pharmacy, Finance, and Dietary    DISCUSSION and OUTCOME: Committee decided pt will be taken off the kidney transplant wait list, as transplant office has not been able to get ahold of pt with multiple attempts.  In the future if pt is interested in coming back for a kidney transplant referral he will need to wait a year and have a letter of support from his nephrologist.    Coordinator called pt and left him a msg with committee's decision. Staff msg sent to Dr. Mcpherson. Coordinator called and spoke with dialysis about committee's decision. [facility] updated, immunology & PFR notified, de-list letter routed to admin team to send out.

## 2017-12-06 NOTE — LETTER
December 6, 2017    Murray Nicholson  [address]  [place] [address]      Dear Mr. Nicholson,    The purpose of this letter is to let you know the [facility] Multi-Disciplinary Selection Team made the decision to remove you from the kidney transplant list.  This is because we have attempted to contact you several times and have not heard from you. If you are interested in being referred in the future for a kidney transplant you must wait a year and have a letter of support from your nephrologist.  Important things you should know:    If you would like to discuss the decision, or if your medical status changes, you may call [phone] and ask to speak to your transplant coordinator.    We recommend that you continue to follow up with your primary care and referring physicians in order to manage your health concerns.  Enclosed is a letter from [facility] which describes the services offered to patients by [facility] and the [facility].  Thank you for allowing us to participate in your care.  We wish you well.  Sincerely,  Kidney Transplant Team  Enclosure:  [facility] Letter   CC:  Brice Caraballo MD;  Ana Luisa Mcpherson MD;           [facility])

## 2017-12-07 RX ORDER — AMLODIPINE BESYLATE 5 MG/1
5 TABLET ORAL DAILY
Qty: 90 TABLET | Refills: 0 | Status: SHIPPED | OUTPATIENT
Start: 2017-12-07 | End: 2018-01-29

## 2017-12-07 RX ORDER — METOPROLOL SUCCINATE 100 MG/1
100 TABLET, EXTENDED RELEASE ORAL DAILY
Qty: 90 TABLET | Refills: 3 | Status: SHIPPED | OUTPATIENT
Start: 2017-12-07 | End: 2018-01-29

## 2017-12-07 NOTE — TELEPHONE ENCOUNTER
[facility]  Original authorizing provider: Yahir Turcios MD    Murray Nicholson would like a refill of the following medications:  amLODIPine (NORVASC) 5 MG tablet [Yahir Turcios MD]    Preferred pharmacy: [id number] - [place], [place] - [address] AT [facility]    Comment:      Medication renewals requested in this message routed to other providers:  allopurinol (ZYLOPRIM) 300 MG tablet [Darvin Tang MD, MD]  predniSONE (DELTASONE) 5 MG tablet [Darvin Tang MD, MD]  metoprolol (TOPROL XL) 100 MG 24 hr tablet [Leia De Leon, VERONICA CNP]

## 2017-12-07 NOTE — TELEPHONE ENCOUNTER
BP Readings from Last 1 Encounters:   11/21/17 146/74     Forward to pcp d/t underlying illness and abn labs    Last Basic Metabolic Panel:  Lab Results   Component Value Date     11/21/2017      Lab Results   Component Value Date    POTASSIUM 4.6 11/21/2017     Lab Results   Component Value Date    CHLORIDE 102 11/21/2017     Lab Results   Component Value Date    TRINI 8.9 11/21/2017     Lab Results   Component Value Date    CO2 29 11/21/2017     Lab Results   Component Value Date    BUN 66 11/21/2017     Lab Results   Component Value Date    CR 7.65 11/21/2017     Lab Results   Component Value Date     11/21/2017

## 2017-12-07 NOTE — TELEPHONE ENCOUNTER
Message from [facility]:  Original authorizing provider: VERONICA Shah CNP    Murray Nicholson would like a refill of the following medications:  metoprolol (TOPROL XL) 100 MG 24 hr tablet [VERONICA Shah CNP]    Preferred pharmacy: [id number] - [place], [place] - [address]    Comment:      Medication renewals requested in this message routed to other providers:  allopurinol (ZYLOPRIM) 300 MG tablet [Darvin Tang MD, MD]  predniSONE (DELTASONE) 5 MG tablet [Darvin Tang MD, MD]  amLODIPine (NORVASC) 5 MG tablet [Yahir Turcios MD]

## 2017-12-08 ENCOUNTER — TELEPHONE (OUTPATIENT)
Dept: TRANSPLANT | Facility: CLINIC | Age: 62
End: 2017-12-08

## 2017-12-08 NOTE — TELEPHONE ENCOUNTER
Call from Murray today. He understood that we were looking for him and he was seeing so many doctors he took a break from transplant. Informed him he was off the list. Gave dates of the letters and phone calls. He will call Britni on Monday to discuss terms for coming back as a new referral.

## 2017-12-11 ENCOUNTER — MYC MEDICAL ADVICE (OUTPATIENT)
Dept: RHEUMATOLOGY | Facility: CLINIC | Age: 62
End: 2017-12-11

## 2017-12-11 ENCOUNTER — TELEPHONE (OUTPATIENT)
Dept: TRANSPLANT | Facility: CLINIC | Age: 62
End: 2017-12-11

## 2017-12-11 DIAGNOSIS — M10.9 ACUTE GOUTY ARTHRITIS: Primary | ICD-10-CM

## 2017-12-11 DIAGNOSIS — M1A.0390 CHRONIC GOUT OF WRIST, UNSPECIFIED CAUSE, UNSPECIFIED LATERALITY: ICD-10-CM

## 2017-12-11 DIAGNOSIS — M10.9 GOUT, UNSPECIFIED CAUSE, UNSPECIFIED CHRONICITY, UNSPECIFIED SITE: ICD-10-CM

## 2017-12-11 RX ORDER — PREDNISONE 10 MG/1
20 TABLET ORAL DAILY
Qty: 30 TABLET | Refills: 1 | Status: SHIPPED | OUTPATIENT
Start: 2017-12-11 | End: 2017-12-15

## 2017-12-11 RX ORDER — PREDNISONE 5 MG/1
5 TABLET ORAL DAILY
Qty: 90 TABLET | Refills: 1 | Status: CANCELLED | OUTPATIENT
Start: 2017-12-11

## 2017-12-11 NOTE — TELEPHONE ENCOUNTER
Call placed to pt. He reports that he has been experiencing pain in his large toe for the last couple of days. Puts the pain at 8/10 on the pain scale. Denies redness, warmth or swelling of the joint. Pt said he will take 20 mgs when he flares for a few days. Murray took his last pills yesterday and is wanting a refill of them.    Prescription set up and routed to Dr Tang. Message routed.    REMI GtzN RN  Rheumatology RN Coordinator  [facility]

## 2017-12-12 ENCOUNTER — TELEPHONE (OUTPATIENT)
Dept: TRANSPLANT | Facility: CLINIC | Age: 62
End: 2017-12-12

## 2017-12-13 ENCOUNTER — TELEPHONE (OUTPATIENT)
Dept: TRANSPLANT | Facility: CLINIC | Age: 62
End: 2017-12-13

## 2017-12-13 NOTE — TELEPHONE ENCOUNTER
Coordinator returned pt's call and left msg.    Pt called coordinator back and reviewed what he needs to do to get another referral to our center. Reviewed committee's decision (review note from 12/6/17). Pt verbalized understanding.

## 2017-12-15 ENCOUNTER — OFFICE VISIT (OUTPATIENT)
Dept: RHEUMATOLOGY | Facility: CLINIC | Age: 62
End: 2017-12-15
Attending: INTERNAL MEDICINE
Payer: COMMERCIAL

## 2017-12-15 VITALS
WEIGHT: 199 LBS | SYSTOLIC BLOOD PRESSURE: 110 MMHG | HEIGHT: 69 IN | TEMPERATURE: 97.7 F | OXYGEN SATURATION: 97 % | DIASTOLIC BLOOD PRESSURE: 65 MMHG | HEART RATE: 87 BPM | BODY MASS INDEX: 29.47 KG/M2

## 2017-12-15 DIAGNOSIS — M1A.0390 CHRONIC GOUT OF WRIST, UNSPECIFIED CAUSE, UNSPECIFIED LATERALITY: ICD-10-CM

## 2017-12-15 DIAGNOSIS — M10.9 GOUT, UNSPECIFIED CAUSE, UNSPECIFIED CHRONICITY, UNSPECIFIED SITE: ICD-10-CM

## 2017-12-15 PROCEDURE — 99213 OFFICE O/P EST LOW 20 MIN: CPT | Mod: ZF

## 2017-12-15 RX ORDER — PREDNISONE 20 MG/1
40 TABLET ORAL DAILY
Qty: 30 TABLET | Refills: 2 | Status: ON HOLD | OUTPATIENT
Start: 2017-12-15 | End: 2018-02-14

## 2017-12-15 RX ORDER — PREDNISONE 5 MG/1
5 TABLET ORAL DAILY
Qty: 90 TABLET | Refills: 1 | Status: SHIPPED | OUTPATIENT
Start: 2017-12-15 | End: 2018-03-16

## 2017-12-15 ASSESSMENT — PAIN SCALES - GENERAL: PAINLEVEL: NO PAIN (0)

## 2017-12-15 NOTE — PATIENT INSTRUCTIONS
Check uric acid at any [facility] location as soon as possible. I will call or message you to adjust allopurinol.    Come back in 3-6 months or so.

## 2017-12-15 NOTE — MR AVS SNAPSHOT
After Visit Summary   12/15/2017    Murray Nicholson    MRN: [id number]           Patient Information     Date Of Birth          1955        Visit Information        Provider Department      12/15/2017 9:30 AM Darvin Tang MD [facility]        Today's Diagnoses     Gout, unspecified cause, unspecified chronicity, unspecified site        Chronic gout of wrist, unspecified cause, unspecified laterality          Care Instructions    Check uric acid at any [place] location as soon as possible. I will call or message you to adjust allopurinol.    Come back in 3-6 months or so.          Follow-ups after your visit        Follow-up notes from your care team     Return in about 3 months (around 3/15/2018).      Who to contact     If you have questions or need follow up information about today's clinic visit or your schedule please contact [facility] RHEUMAT[facility] directly at [phone].  Normal or non-critical lab and imaging results will be communicated to you by MyChart, letter or phone within 4 business days after the clinic has received the results. If you do not hear from us within 7 days, please contact the clinic through [employer] or phone. If you have a critical or abnormal lab result, we will notify you by phone as soon as possible.  Submit refill requests through [employer] or call your pharmacy and they will forward the refill request to us. Please allow 3 business days for your refill to be completed.          Additional Information About Your Visit        MyChart Information     [employer] gives you secure access to your electronic health record. If you see a primary care provider, you can also send messages to your care team and make appointments. If you have questions, please call your primary care clinic.  If you do not have a primary care provider, please call [phone] and they will assist you.        Care EveryWhere ID     This is your Care EveryWhere ID. This could be used by other  "organizations to access your [place] medical records  [id number]        Your Vitals Were     Pulse Temperature Height Pulse Oximetry BMI (Body Mass Index)       87 97.7  F (36.5  C) (Oral) 1.753 m (5' 9\") 97% 29.39 kg/m2        Blood Pressure from Last 3 Encounters:   12/15/17 110/65   11/21/17 146/74   11/15/17 132/77    Weight from Last 3 Encounters:   12/15/17 90.3 kg (199 lb)   11/21/17 88.3 kg (194 lb 9.6 oz)   11/15/17 87.6 kg (193 lb 3.2 oz)                 Today's Medication Changes          These changes are accurate as of: 12/15/17 11:59 PM.  If you have any questions, ask your nurse or doctor.               These medicines have changed or have updated prescriptions.        Dose/Directions    * predniSONE 5 MG tablet   Commonly known as:  DELTASONE   This may have changed:  Another medication with the same name was changed. Make sure you understand how and when to take each.   Used for:  Gout, unspecified cause, unspecified chronicity, unspecified site, Chronic gout of wrist, unspecified cause, unspecified laterality   Changed by:  Darvin Tang MD        Dose:  5 mg   Take 1 tablet (5 mg) by mouth daily   Quantity:  90 tablet   Refills:  1       * predniSONE 20 MG tablet   Commonly known as:  DELTASONE   This may have changed:    - medication strength  - how much to take  - additional instructions   Used for:  Gout, unspecified cause, unspecified chronicity, unspecified site   Changed by:  Darvin Tang MD        Dose:  40 mg   Take 2 tablets (40 mg) by mouth daily for 3 days for gout flares   Quantity:  30 tablet   Refills:  2       * Notice:  This list has 2 medication(s) that are the same as other medications prescribed for you. Read the directions carefully, and ask your doctor or other care provider to review them with you.         Where to get your medicines      These medications were sent to [id number] - [place], [place] - [address] [place] [address] " [place], [place] [address]     Phone:  [phone]     predniSONE 20 MG tablet    predniSONE 5 MG tablet                Primary Care Provider Office Phone # Fax #    Ana Luisa Margarita Mcpherson [phone]        [facility] [id number]  [facility] [address]        Equal Access to Services     JOHN HAUSER : Hadii aad ku hadasho Soomaali, waaxda luqadaha, qaybta kaalmada adeegyada, waxay idiin hayaan adeeg kharash laRachaelaan . So [facility] [phone].    ATENCIÓN: Si habla español, tiene a ortiz disposición servicios gratuitos de asistencia lingüística. Vicnova al [phone].    We comply with applicable federal civil rights laws and [place] laws. We do not discriminate on the basis of race, color, national origin, age, disability, sex, sexual orientation, or gender identity.            Thank you!     Thank you for choosing [facility] RHEUMATOLOGY  for your care. Our goal is always to provide you with excellent care. Hearing back from our patients is one way we can continue to improve our services. Please take a few minutes to complete the written survey that you may receive in the mail after your visit with us. Thank you!             Your Updated Medication List - Protect others around you: Learn how to safely use, store and throw away your medicines at www.disposemymeds.org.          This list is accurate as of: 12/15/17 11:59 PM.  Always use your most recent med list.                   Brand Name Dispense Instructions for use Diagnosis    albuterol 108 (90 BASE) MCG/ACT Inhaler    PROAIR HFA/PROVENTIL HFA/VENTOLIN HFA    1 Inhaler    Inhale 2 puffs into the lungs every 6 hours as needed for shortness of breath / dyspnea or wheezing    Cough       * allopurinol 100 MG tablet    ZYLOPRIM    30 tablet    Take 1 tablet (100 mg) by mouth daily Take with 300 mg tabs for 400 mg /day total.    Chronic gout of wrist, unspecified cause, unspecified laterality, Gout, unspecified cause, unspecified chronicity,  unspecified site       * allopurinol 300 MG tablet    ZYLOPRIM    30 tablet    Take 1 tablet (300 mg) by mouth daily Take with 100 mg tabs for 400 mg /day total.    Chronic gout of wrist, unspecified cause, unspecified laterality, Gout, unspecified cause, unspecified chronicity, unspecified site       amLODIPine 5 MG tablet    NORVASC    90 tablet    Take 1 tablet (5 mg) by mouth daily    Hypertension goal BP (blood pressure) < 130/80       aspirin 81 MG tablet      Take 1 tablet (81 mg) by mouth at bedtime        atorvastatin 40 MG tablet    LIPITOR    90 tablet    Take 1 tablet (40 mg) by mouth daily    CKD (chronic kidney disease) stage 3, GFR 30-59 ml/min, Hyperlipidemia LDL goal <100       bumetanide 2 MG tablet    BUMEX    180 tablet    Take 3 tablets (6 mg) by mouth 2 times daily    Systolic congestive heart failure, unspecified congestive heart failure chronicity (H)       calcitRIOL 0.25 MCG capsule    ROCALTROL    90 capsule    Take 1 capsule (0.25 mcg) by mouth daily    Hypocalcemia       ferrous sulfate 325 (65 FE) MG tablet    IRON    200 tablet    Take 1 tablet (325 mg) by mouth    Anemia in stage 5 chronic kidney disease (H), CKD (chronic kidney disease) stage 5, GFR less than 15 ml/min (H)       fluticasone 50 MCG/ACT spray    FLONASE    16 g    Spray 1-2 sprays into both nostrils daily    Nasal congestion       glipiZIDE 5 MG tablet    GLUCOTROL    90 tablet    Take 1 tablet by mouth. TAKE 1 TABLET BY MOUTH DAILY BEFORE A MEAL    Type 2 diabetes mellitus with other specified complication (H)       hydrALAZINE 50 MG tablet    APRESOLINE    270 tablet    Take 1 tablet (50 mg) by mouth 3 times daily    Type 2 diabetes mellitus with stage 5 chronic kidney disease not on chronic dialysis, without long-term current use of insulin (H)       isosorbide mononitrate 60 MG 24 hr tablet    IMDUR    90 tablet    Take 1 tablet (60 mg) by mouth daily    Chronic systolic congestive heart failure (H)        loratadine 10 MG tablet    CLARITIN     Take 10 mg by mouth daily as needed Reported on 5/3/2017    Hypertensive cardiopathy, SOB (shortness of breath)       losartan 100 MG tablet    COZAAR    90 tablet    TAKE 1 TABLET BY MOUTH DAILY    Renal hypertension, stage 1-4 or unspecified chronic kidney disease       metoprolol 100 MG 24 hr tablet    TOPROL XL    90 tablet    Take 1 tablet (100 mg) by mouth daily    Chronic systolic congestive heart failure (H)       omeprazole 20 MG CR capsule    priLOSEC     Take 20 mg by mouth daily At night        potassium chloride SA 20 MEQ CR tablet    K-DUR/KLOR-CON M    270 tablet    TAKE 2 TABLETS BY MOUTH EVERY MORNING AND 1 TABLET BY MOUTH IN THE EVENING    Combined systolic and diastolic congestive heart failure, unspecified congestive heart failure chronicity (H), Hypervolemia, unspecified hypervolemia type, Hospital discharge follow-up       * predniSONE 5 MG tablet    DELTASONE    90 tablet    Take 1 tablet (5 mg) by mouth daily    Gout, unspecified cause, unspecified chronicity, unspecified site, Chronic gout of wrist, unspecified cause, unspecified laterality       * predniSONE 20 MG tablet    DELTASONE    30 tablet    Take 2 tablets (40 mg) by mouth daily for 3 days for gout flares    Gout, unspecified cause, unspecified chronicity, unspecified site       spironolactone 100 MG tablet    ALDACTONE    30 tablet    Take 0.5 tablets (50 mg) by mouth daily    Hypokalemia, Chronic systolic congestive heart failure (H)       * Notice:  This list has 4 medication(s) that are the same as other medications prescribed for you. Read the directions carefully, and ask your doctor or other care provider to review them with you.

## 2017-12-15 NOTE — NURSING NOTE
"Chief Complaint   Patient presents with     RECHECK     follow up Volume overload       Initial /65 (BP Location: Right arm, Patient Position: Sitting, Cuff Size: Adult Regular)  Pulse 87  Temp 97.7  F (36.5  C) (Oral)  Ht 1.753 m (5' 9\")  Wt 90.3 kg (199 lb)  SpO2 97%  BMI 29.39 kg/m2 Estimated body mass index is 29.39 kg/(m^2) as calculated from the following:    Height as of this encounter: 1.753 m (5' 9\").    Weight as of this encounter: 90.3 kg (199 lb).  Medication Reconciliation: complete    "

## 2017-12-15 NOTE — PROGRESS NOTES
Rheumatology Clinic Visit     Murray Nicholson MRN# [id number]   YOB: 1955 Age: 62 year old     Date of Visit: 12/15/2017  Primary care provider: Yahir Turcios          Assessment and Plan:   #Polyarticular gout, recurrent and involving the hips with dual-energy CT showing MSU deposition bilaterally  #Hyperuricemia  #ESRD on peritoneal dialysis  #DM II     -continue allopurinol to 400 mg daily, will adjust based off labs next week when he has time in his schedule  -uric acid goal is < 6.0  -could potentially need alternative therapy (febuxostat) if unable to get uric acid to goal with allopurinol given ESRD  -continue prednisone to 5 mg daily for flare prophylaxis  -prednisone 40 mg daily for 3 days for gout flare    Seen and discussed with Dr. Giles Tang MD  Rheumatology Fellow  [phone]    Attending Note: I saw and evaluated the patient with Dr. Tang. I agree with the assessment and plan.    Lamberto Skaggs MD    Orders Placed This Encounter   Procedures     Uric acid     CBC with platelets differential     ALT     AST     Bilirubin  total     Creatinine     Alkaline phosphatase     Uric acid             Active Problem List:     Patient Active Problem List    Diagnosis Date Noted     Volume overload 11/13/2017     Priority: Medium     Chronic systolic heart failure (H) 07/25/2017     Priority: Medium     Fluid overload 04/08/2017     Priority: Medium     Anemia in stage 5 chronic kidney disease (H) 03/17/2017     Priority: Medium     Anemia, iron deficiency 02/15/2017     Priority: Medium     Type 2 diabetes mellitus with diabetic chronic kidney disease (H) 10/14/2015     Priority: Medium     Hypertension      Priority: Medium     Dyslipidemia      Priority: Medium     MGUS (monoclonal gammopathy of unknown significance)      Priority: Medium     Ascending aortic aneurysm (H)      Priority: Medium     Anemia in chronic renal disease 05/19/2015     Priority: Medium     updating  diagnosis code for icd10 cutover       CAD (coronary artery disease) 07/10/2014     Priority: Medium     Bicuspid aortic valve      Priority: Medium     Anemia of chronic disease 04/12/2013     Priority: Medium     Problem list name updated by automated process. Provider to review and confirm       Tubular adenoma 12/30/2011     Priority: Medium     Hypertensive cardiopathy 08/24/2011     Priority: Medium     Hypertension goal BP (blood pressure) < 130/80 01/25/2011     Priority: Medium     Hyperlipidemia LDL goal <100 10/31/2010     Priority: Medium     Per provider       CKD (chronic kidney disease) stage 5, GFR less than 15 ml/min (H) 02/09/2010     Priority: Medium     CHF (congestive heart failure) (H) 08/20/2008     Priority: Medium     Allergic rhinitis 04/05/2006     Priority: Medium     Problem list name updated by automated process. Provider to review       Hypersomnia with sleep apnea 08/18/2005     Priority: Medium     Problem list name updated by automated process. Provider to review       Esophageal reflux 08/12/2004     Priority: Medium          History of Present Illness:   Murray Nicholson is a 62 year old male with history of CAD and ischemic cardiomyopathy, CKD V planning on starting peritoneal dialysis next month, and DM II who presents for evaluation of gout. He was seen initially in April of 2017 while in the hospital by Dr. Gaston. He had polyarticular gout at that time, however he also had diffuse low back pain radiating into the groin and an MRI of the lumbar spine showed inflammation of the soft tissues and muscles of the right hip. Subsequent hip MRI showed cystic changes in both hips. His uric acid was checked and was 17.9. He was diagnosed with gout clinically because he declined an aspiration. He was started on allopurinol and prednisone with prompt resolution of his symptoms. His CK was normal. He also reported a 1.5-2.0 year history of intermittent episodes of podagra and acute gout of  his ankles. He has not had a kidney biopsy.     He denies pain in his hands, elbows, or shoulders. He also denies lumps, bumps, or deposits consistent with tophi. He does not have morning stiffness. Since being seen last he has been taking allopurinol 100 mg daily without trouble. He takes 10 mg of prednisone twice per day but often bumps it up on his own if he feels a flare is impending. He is still working actively at the [employer] and thus becomes quite incapacitated by gout.    Last seen: 8/8/2017    Interim history:  Murray is doing well apart from his hospitalization for CHF last month. He has had minimal gout flares, used 15 20 mg tablets of prednisone since I saw him in August. He did call for a refill last week, though notes this was for cramping in his legs which he was not sure was related to gout. We discussed his dialysis, CHF.         Review of Systems (other than in HPI):   Constitutional: negative  Skin: negative  Eyes: negative  Ears/Nose/Throat: negative  Respiratory: negative  Cardiovascular: negative  Gastrointestinal: negative  Genitourinary: negative  Musculoskeletal: negative  Neurologic: negative  Psychiatric: negative  Hematologic/Lymphatic/Immunologic: negative  Endocrine: negative          Past Medical History:     Past Medical History:   Diagnosis Date     (HFpEF) heart failure with preserved ejection fraction (H)      Allergic rhinitis, cause unspecified      Anemia of chronic kidney failure      Ascending aortic aneurysm (H)      Bicuspid aortic valve      CAD (coronary artery disease)      Chronic kidney disease, stage 5 (H)      Congestive heart failure, unspecified      Dyslipidemia      Esophageal reflux      Hypersomnia with sleep apnea, unspecified      Hypertension      MGUS (monoclonal gammopathy of unknown significance)      SHEELA (obstructive sleep apnea)      Systolic heart failure (H)      Type 2 diabetes mellitus (H)      Past Surgical History:   Procedure Laterality Date      ABDOMEN SURGERY      Hernia     LAPAROSCOPIC HERNIORRHAPHY INGUINAL BILATERAL Bilateral 2015    Procedure: LAPAROSCOPIC HERNIORRHAPHY INGUINAL BILATERAL;  Surgeon: Bobby Mcconnell MD;  Location: [facility] OR     LAPAROSCOPIC INSERTION CATHETER PERITONEAL DIALYSIS N/A 2017    Procedure: LAPAROSCOPIC INSERTION CATHETER PERITONEAL DIALYSIS;  Laparoscopic Peritoneal Dialysis Catheter Placement - Anesthesia with block;  Surgeon: Esteban Arvizu MD;  Location: [facility] OR          Social History:     Social History     Occupational History     Not on file.     Social History Main Topics     Smoking status: Former Smoker     Packs/day: 1.00     Years: 19.00     Types: Cigarettes     Quit date: 1994     Smokeless tobacco: Never Used     Alcohol use No     Drug use: No     Sexual activity: Not Currently     Partners: Female     Birth control/ protection: Condom          Family History:     Family History   Problem Relation Age of Onset     C.A.D. Father       from-never knew father-age 60     DIABETES Father      CEREBROVASCULAR DISEASE Father      Hypertension No family hx of      Breast Cancer No family hx of      Cancer - colorectal No family hx of      Prostate Cancer No family hx of      KIDNEY DISEASE No family hx of           Allergies:     Allergies   Allergen Reactions     Norco [Hydrocodone-Acetaminophen] Nausea and Vomiting     Cats      Throat tightness     Penicillins Hives     Seasonal Allergies      rhinitis     Shrimp      Throat closes           Medications:     Current Outpatient Prescriptions   Medication Sig Dispense Refill     predniSONE (DELTASONE) 10 MG tablet Take 2 tablets (20 mg) by mouth daily X 4 days when flaring. 30 tablet 1     amLODIPine (NORVASC) 5 MG tablet Take 1 tablet (5 mg) by mouth daily 90 tablet 0     metoprolol (TOPROL XL) 100 MG 24 hr tablet Take 1 tablet (100 mg) by mouth daily 90 tablet 3     allopurinol (ZYLOPRIM) 100 MG tablet Take 1 tablet (100 mg) by mouth  daily Take with 300 mg tabs for 400 mg /day total. 30 tablet 2     allopurinol (ZYLOPRIM) 300 MG tablet Take 1 tablet (300 mg) by mouth daily Take with 100 mg tabs for 400 mg /day total. 30 tablet 2     losartan (COZAAR) 100 MG tablet TAKE 1 TABLET BY MOUTH DAILY 90 tablet 0     albuterol (PROAIR HFA/PROVENTIL HFA/VENTOLIN HFA) 108 (90 BASE) MCG/ACT Inhaler Inhale 2 puffs into the lungs every 6 hours as needed for shortness of breath / dyspnea or wheezing 1 Inhaler 0     fluticasone (FLONASE) 50 MCG/ACT spray Spray 1-2 sprays into both nostrils daily 16 g 11     spironolactone (ALDACTONE) 100 MG tablet Take 0.5 tablets (50 mg) by mouth daily 30 tablet 11     bumetanide (BUMEX) 2 MG tablet Take 3 tablets (6 mg) by mouth 2 times daily 180 tablet 3     glipiZIDE (GLUCOTROL) 5 MG tablet Take 1 tablet by mouth. TAKE 1 TABLET BY MOUTH DAILY BEFORE A MEAL 90 tablet 0     predniSONE (DELTASONE) 5 MG tablet Take 1 tablet (5 mg) by mouth daily 90 tablet 1     potassium chloride SA (K-DUR/KLOR-CON M) 20 MEQ CR tablet TAKE 2 TABLETS BY MOUTH EVERY MORNING AND 1 TABLET BY MOUTH IN THE EVENING 270 tablet 1     ferrous sulfate (IRON) 325 (65 FE) MG tablet Take 1 tablet (325 mg) by mouth 200 tablet 3     calcitRIOL (ROCALTROL) 0.25 MCG capsule Take 1 capsule (0.25 mcg) by mouth daily 90 capsule 0     atorvastatin (LIPITOR) 40 MG tablet Take 1 tablet (40 mg) by mouth daily 90 tablet 3     isosorbide mononitrate (IMDUR) 60 MG 24 hr tablet Take 1 tablet (60 mg) by mouth daily 90 tablet 3     hydrALAZINE (APRESOLINE) 50 MG tablet Take 1 tablet (50 mg) by mouth 3 times daily 270 tablet 3     omeprazole (PRILOSEC) 20 MG CR capsule Take 20 mg by mouth daily At night       loratadine (CLARITIN) 10 MG tablet Take 10 mg by mouth daily as needed Reported on 5/3/2017       ASPIRIN 81 MG OR TABS Take 1 tablet (81 mg) by mouth at bedtime            Physical Exam:   Blood pressure 110/65, pulse 87, temperature 97.7  F (36.5  C), temperature  "source Oral, height 1.753 m (5' 9\"), weight 90.3 kg (199 lb), SpO2 97 %.  Wt Readings from Last 4 Encounters:   12/15/17 90.3 kg (199 lb)   11/21/17 88.3 kg (194 lb 9.6 oz)   11/15/17 87.6 kg (193 lb 3.2 oz)   08/18/17 89.4 kg (197 lb)     Constitutional: well-developed, appearing stated age; cooperative  Eyes: normal EOM, PERRLA, vision, conjunctiva, sclera  ENT: normal external ears, nose, hearing, lips, teeth, gums, throat, no mucous membrane lesions, normal saliva pool  Neck: no mass or thyroid enlargement  Resp: lungs clear to auscultation  CV: regular rate and rhythm, no murmurs, rubs or gallops, no edema  GI: no abdominal mass or tenderness, no organomegaly  : not tested  Lymph: no cervical, supraclavicular, or epitrochlear nodes  MS: The TMJ, neck, shoulder, elbow, wrist, MCP/PIP/DIP, spine, hip, knee, ankle, and foot MTP/IP joints were examined. No active synovitis or altered joint anatomy. Full joint ROM. Normal  strength. No dactylitis,  tenosynovitis, enthesopathy.   Skin: no nail pitting, alopecia, rash, nodules or lesions  Neuro: normal cranial nerves, strength, sensation, DTR's  Psych: normal judgement, orientation, memory, affect.         Data:     Results for orders placed or performed in visit on 11/21/17   Basic metabolic panel   Result Value Ref Range    Sodium 142 133 - 144 mmol/L    Potassium 4.6 3.4 - 5.3 mmol/L    Chloride 102 94 - 109 mmol/L    Carbon Dioxide 29 20 - 32 mmol/L    Anion Gap 11 3 - 14 mmol/L    Glucose 189 (H) 70 - 99 mg/dL    Urea Nitrogen 66 (H) 7 - 30 mg/dL    Creatinine 7.65 (H) 0.66 - 1.25 mg/dL    GFR Estimate 7 (L) >60 mL/min/1.7m2    GFR Estimate If Black 9 (L) >60 mL/min/1.7m2    Calcium 8.9 8.5 - 10.1 mg/dL   Magnesium   Result Value Ref Range    Magnesium 1.9 1.6 - 2.3 mg/dL     Hemoglobin   Date Value Ref Range Status   11/14/2017 9.1 (L) 13.3 - 17.7 g/dL Final   11/13/2017 8.9 (L) 13.3 - 17.7 g/dL Final   08/02/2017 10.4 (L) 13.3 - 17.7 g/dL Final     Urea " Nitrogen   Date Value Ref Range Status   11/21/2017 66 (H) 7 - 30 mg/dL Final   11/15/2017 69 (H) 7 - 30 mg/dL Final   11/14/2017 76 (H) 7 - 30 mg/dL Final     Creatinine   Date Value Ref Range Status   06/03/2013 2.1 (H) 0.66 - 1.25 mg/dL Final   05/17/2010 1.8 (H) 0.66 - 1.25 mg/dL Final     Comment:     New IDMS-traceable calibration  beginning 12/17/08     Sed Rate   Date Value Ref Range Status   01/13/2016 80 (H) 0 - 20 mm/h Final     CRP Inflammation   Date Value Ref Range Status   07/05/2017 35.4 (H) 0.0 - 8.0 mg/L Final   05/31/2017 15.9 (H) 0.0 - 8.0 mg/L Final   05/17/2017 39.3 (H) 0.0 - 8.0 mg/L Final     AST   Date Value Ref Range Status   11/13/2017 22 0 - 45 U/L Final   07/05/2017 13 0 - 45 U/L Final   05/31/2017 18 0 - 45 U/L Final     Albumin   Date Value Ref Range Status   11/13/2017 2.3 (L) 3.4 - 5.0 g/dL Final   08/02/2017 3.2 (L) 3.4 - 5.0 g/dL Final   07/05/2017 2.7 (L) 3.4 - 5.0 g/dL Final     Alkaline Phosphatase   Date Value Ref Range Status   11/13/2017 104 40 - 150 U/L Final   07/05/2017 98 40 - 150 U/L Final   05/31/2017 97 40 - 150 U/L Final     ALT   Date Value Ref Range Status   11/13/2017 26 0 - 70 U/L Final   07/05/2017 15 0 - 70 U/L Final   05/31/2017 16 0 - 70 U/L Final     Recent Labs   Lab Test  11/21/17   1239  11/15/17   0758  11/14/17   0645  11/13/17   1709  08/02/17   1311   07/05/17   1204   05/31/17   1111   04/12/17   0935   04/09/15   0900   05/15/13   1418   WBC   --    --   10.1  9.6  7.1   --   11.9*   < >  8.0   < >   --    < >  6.8   < >   --    HGB   --    --   9.1*  8.9*  10.4*   < >  8.9*   < >  9.9*   < >   --    < >  8.8*   < >   --    HCT   --    --   29.8*  28.9*  31.6*   < >  27.4*   < >  31.1*   < >   --    < >  26.2*   < >   --    MCV   --    --   104*  101*  92   --   91   < >  92   < >   --    < >  86   < >   --    PLT   --    --   234  277  221   --   267   < >  302   < >   --    < >  339   < >   --    BUN  66*  69*  76*  81*  137*   --   130*   < >   146*   < >   --    < >  54*   < >  27   TSH   --    --    --    --    --    --    --    --    --    --   1.26   --   3.47   --   2.05   AST   --    --    --   22   --    --   13   --   18   < >   --    < >  19   --   33   ALT   --    --    --   26   --    --   15   --   16   < >   --    < >  25   --   36   ALKPHOS   --    --    --   104   --    --   98   --   97   < >   --    < >  154*   --   100    < > = values in this interval not displayed.     Reviewed Rheumatology lab flowsheet

## 2017-12-22 DIAGNOSIS — E83.39 HYPERPHOSPHATEMIA: ICD-10-CM

## 2017-12-22 DIAGNOSIS — E87.79 OTHER HYPERVOLEMIA: Primary | ICD-10-CM

## 2017-12-22 RX ORDER — CALCIUM ACETATE 667 MG/1
1 TABLET ORAL
Qty: 180 TABLET | Refills: 3 | Status: ON HOLD | OUTPATIENT
Start: 2017-12-22 | End: 2018-07-02

## 2017-12-22 RX ORDER — TORSEMIDE 100 MG/1
100 TABLET ORAL 2 TIMES DAILY
Qty: 60 TABLET | Refills: 11 | Status: ON HOLD | OUTPATIENT
Start: 2017-12-22 | End: 2018-02-14

## 2017-12-29 ENCOUNTER — TRANSFERRED RECORDS (OUTPATIENT)
Dept: HEALTH INFORMATION MANAGEMENT | Facility: CLINIC | Age: 62
End: 2017-12-29

## 2018-01-07 ENCOUNTER — APPOINTMENT (OUTPATIENT)
Dept: GENERAL RADIOLOGY | Facility: CLINIC | Age: 63
DRG: 981 | End: 2018-01-07
Attending: EMERGENCY MEDICINE
Payer: COMMERCIAL

## 2018-01-07 ENCOUNTER — HOSPITAL ENCOUNTER (INPATIENT)
Facility: CLINIC | Age: 63
LOS: 13 days | Discharge: HOME | DRG: 981 | End: 2018-01-20
Attending: EMERGENCY MEDICINE | Admitting: INTERNAL MEDICINE
Payer: COMMERCIAL

## 2018-01-07 DIAGNOSIS — R10.84 ABDOMINAL PAIN, GENERALIZED: ICD-10-CM

## 2018-01-07 DIAGNOSIS — K65.2 SBP (SPONTANEOUS BACTERIAL PERITONITIS) (H): ICD-10-CM

## 2018-01-07 DIAGNOSIS — N18.5 CKD (CHRONIC KIDNEY DISEASE) STAGE 5, GFR LESS THAN 15 ML/MIN (H): Primary | ICD-10-CM

## 2018-01-07 DIAGNOSIS — K65.9 PERITONITIS (H): ICD-10-CM

## 2018-01-07 LAB
ALBUMIN SERPL-MCNC: 1.8 G/DL (ref 3.4–5)
ALP SERPL-CCNC: 76 U/L (ref 40–150)
ALT SERPL W P-5'-P-CCNC: 24 U/L (ref 0–70)
ANION GAP SERPL CALCULATED.3IONS-SCNC: 12 MMOL/L (ref 3–14)
APPEARANCE FLD: NORMAL
AST SERPL W P-5'-P-CCNC: 32 U/L (ref 0–45)
BASOPHILS # BLD AUTO: 0 10E9/L (ref 0–0.2)
BASOPHILS NFR BLD AUTO: 0.2 %
BILIRUB SERPL-MCNC: 0.4 MG/DL (ref 0.2–1.3)
BUN SERPL-MCNC: 42 MG/DL (ref 7–30)
CALCIUM SERPL-MCNC: 8.2 MG/DL (ref 8.5–10.1)
CHLORIDE SERPL-SCNC: 97 MMOL/L (ref 94–109)
CO2 SERPL-SCNC: 28 MMOL/L (ref 20–32)
COLOR FLD: YELLOW
CREAT SERPL-MCNC: 7.1 MG/DL (ref 0.66–1.25)
DIFFERENTIAL METHOD BLD: ABNORMAL
EOSINOPHIL # BLD AUTO: 0.1 10E9/L (ref 0–0.7)
EOSINOPHIL NFR BLD AUTO: 1.7 %
ERYTHROCYTE [DISTWIDTH] IN BLOOD BY AUTOMATED COUNT: 17.6 % (ref 10–15)
GFR SERPL CREATININE-BSD FRML MDRD: 8 ML/MIN/1.7M2
GLUCOSE SERPL-MCNC: 171 MG/DL (ref 70–99)
GRAM STN SPEC: NORMAL
HCT VFR BLD AUTO: 31.4 % (ref 40–53)
HGB BLD-MCNC: 10 G/DL (ref 13.3–17.7)
IMM GRANULOCYTES # BLD: 0 10E9/L (ref 0–0.4)
IMM GRANULOCYTES NFR BLD: 0.3 %
LACTATE BLD-SCNC: 1.1 MMOL/L (ref 0.7–2)
LYMPHOCYTES # BLD AUTO: 0.5 10E9/L (ref 0.8–5.3)
LYMPHOCYTES NFR BLD AUTO: 8.6 %
MCH RBC QN AUTO: 31.5 PG (ref 26.5–33)
MCHC RBC AUTO-ENTMCNC: 31.8 G/DL (ref 31.5–36.5)
MCV RBC AUTO: 99 FL (ref 78–100)
MONOCYTES # BLD AUTO: 0.3 10E9/L (ref 0–1.3)
MONOCYTES NFR BLD AUTO: 5.1 %
NEUTROPHILS # BLD AUTO: 5.3 10E9/L (ref 1.6–8.3)
NEUTROPHILS NFR BLD AUTO: 84.1 %
NEUTS BAND NFR FLD MANUAL: 80 %
NRBC # BLD AUTO: 0 10*3/UL
NRBC BLD AUTO-RTO: 0 /100
OTHER CELLS FLD MANUAL: 20 %
PLATELET # BLD AUTO: 277 10E9/L (ref 150–450)
POTASSIUM SERPL-SCNC: 3.7 MMOL/L (ref 3.4–5.3)
PROT SERPL-MCNC: 6 G/DL (ref 6.8–8.8)
RBC # BLD AUTO: 3.17 10E12/L (ref 4.4–5.9)
SODIUM SERPL-SCNC: 137 MMOL/L (ref 133–144)
SPECIMEN SOURCE FLD: NORMAL
SPECIMEN SOURCE: NORMAL
WBC # BLD AUTO: 6.3 10E9/L (ref 4–11)
WBC # FLD AUTO: 4733 /UL

## 2018-01-07 PROCEDURE — 87205 SMEAR GRAM STAIN: CPT | Performed by: INTERNAL MEDICINE

## 2018-01-07 PROCEDURE — 96365 THER/PROPH/DIAG IV INF INIT: CPT | Performed by: EMERGENCY MEDICINE

## 2018-01-07 PROCEDURE — 99285 EMERGENCY DEPT VISIT HI MDM: CPT | Mod: 25 | Performed by: EMERGENCY MEDICINE

## 2018-01-07 PROCEDURE — 83605 ASSAY OF LACTIC ACID: CPT | Performed by: EMERGENCY MEDICINE

## 2018-01-07 PROCEDURE — 89051 BODY FLUID CELL COUNT: CPT | Performed by: INTERNAL MEDICINE

## 2018-01-07 PROCEDURE — 99223 1ST HOSP IP/OBS HIGH 75: CPT | Mod: AI | Performed by: INTERNAL MEDICINE

## 2018-01-07 PROCEDURE — 99285 EMERGENCY DEPT VISIT HI MDM: CPT | Mod: Z6 | Performed by: EMERGENCY MEDICINE

## 2018-01-07 PROCEDURE — 71046 X-RAY EXAM CHEST 2 VIEWS: CPT

## 2018-01-07 PROCEDURE — 87106 FUNGI IDENTIFICATION YEAST: CPT | Performed by: INTERNAL MEDICINE

## 2018-01-07 PROCEDURE — 25000128 H RX IP 250 OP 636: Performed by: EMERGENCY MEDICINE

## 2018-01-07 PROCEDURE — 87076 CULTURE ANAEROBE IDENT EACH: CPT | Performed by: INTERNAL MEDICINE

## 2018-01-07 PROCEDURE — 40000098 ZZH STATISTIC LEVEL 5 EST PATIENT

## 2018-01-07 PROCEDURE — 96375 TX/PRO/DX INJ NEW DRUG ADDON: CPT | Performed by: EMERGENCY MEDICINE

## 2018-01-07 PROCEDURE — 36415 COLL VENOUS BLD VENIPUNCTURE: CPT | Performed by: EMERGENCY MEDICINE

## 2018-01-07 PROCEDURE — 87070 CULTURE OTHR SPECIMN AEROBIC: CPT | Performed by: INTERNAL MEDICINE

## 2018-01-07 PROCEDURE — 27210995 ZZH RX 272: Performed by: INTERNAL MEDICINE

## 2018-01-07 PROCEDURE — 12000001 ZZH R&B MED SURG/OB UMMC

## 2018-01-07 PROCEDURE — 27210995 ZZH RX 272: Performed by: EMERGENCY MEDICINE

## 2018-01-07 PROCEDURE — 87040 BLOOD CULTURE FOR BACTERIA: CPT | Performed by: EMERGENCY MEDICINE

## 2018-01-07 PROCEDURE — 96361 HYDRATE IV INFUSION ADD-ON: CPT | Performed by: EMERGENCY MEDICINE

## 2018-01-07 PROCEDURE — 80053 COMPREHEN METABOLIC PANEL: CPT | Performed by: EMERGENCY MEDICINE

## 2018-01-07 PROCEDURE — 85025 COMPLETE CBC W/AUTO DIFF WBC: CPT | Performed by: EMERGENCY MEDICINE

## 2018-01-07 RX ORDER — GENTAMICIN SULFATE 1 MG/G
CREAM TOPICAL DAILY PRN
Status: DISCONTINUED | OUTPATIENT
Start: 2018-01-07 | End: 2018-01-10

## 2018-01-07 RX ORDER — ONDANSETRON 2 MG/ML
4 INJECTION INTRAMUSCULAR; INTRAVENOUS EVERY 30 MIN PRN
Status: DISCONTINUED | OUTPATIENT
Start: 2018-01-07 | End: 2018-01-20 | Stop reason: HOSPADM

## 2018-01-07 RX ORDER — SODIUM CHLORIDE 9 MG/ML
INJECTION, SOLUTION INTRAVENOUS CONTINUOUS
Status: DISCONTINUED | OUTPATIENT
Start: 2018-01-07 | End: 2018-01-08

## 2018-01-07 RX ORDER — HYDROMORPHONE HYDROCHLORIDE 1 MG/ML
0.5 INJECTION, SOLUTION INTRAMUSCULAR; INTRAVENOUS; SUBCUTANEOUS
Status: DISCONTINUED | OUTPATIENT
Start: 2018-01-07 | End: 2018-01-08

## 2018-01-07 RX ORDER — CEFTAZIDIME 2 G/1
2 INJECTION, POWDER, FOR SOLUTION INTRAVENOUS ONCE
Status: COMPLETED | OUTPATIENT
Start: 2018-01-07 | End: 2018-01-07

## 2018-01-07 RX ORDER — NALOXONE HYDROCHLORIDE 0.4 MG/ML
.1-.4 INJECTION, SOLUTION INTRAMUSCULAR; INTRAVENOUS; SUBCUTANEOUS
Status: DISCONTINUED | OUTPATIENT
Start: 2018-01-07 | End: 2018-01-20 | Stop reason: HOSPADM

## 2018-01-07 RX ADMIN — SODIUM CHLORIDE, SODIUM LACTATE, CALCIUM CHLORIDE, MAGNESIUM CHLORIDE AND DEXTROSE: 1.5; 538; 448; 18.3; 5.08 INJECTION, SOLUTION INTRAPERITONEAL at 18:07

## 2018-01-07 RX ADMIN — SODIUM CHLORIDE, SODIUM LACTATE, CALCIUM CHLORIDE, MAGNESIUM CHLORIDE AND DEXTROSE: 2.5; 538; 448; 18.3; 5.08 INJECTION, SOLUTION INTRAPERITONEAL at 21:20

## 2018-01-07 RX ADMIN — VANCOMYCIN HYDROCHLORIDE 2000 MG: 10 INJECTION, POWDER, LYOPHILIZED, FOR SOLUTION INTRAVENOUS at 21:23

## 2018-01-07 RX ADMIN — CEFTAZIDIME 2 G: 2 INJECTION, POWDER, FOR SOLUTION INTRAVENOUS at 21:22

## 2018-01-07 ASSESSMENT — PAIN DESCRIPTION - DESCRIPTORS: DESCRIPTORS: SHARP

## 2018-01-07 NOTE — LETTER
Transition Communication Hand-off for Care Transitions to Next Level of Care Provider    Name: Murray Nicholson  MRN #: [id number]  Primary Care Provider: Ana Luisa Mcpherson     Primary Clinic: [address] [place] [id number]  [facility] [id number]     Reason for Hospitalization:  SBP (spontaneous bacterial peritonitis) (H) [K65.2]    Admit Date/Time: 1/7/2018  4:36 PM  Discharge Date: 1/22/2018    Payor Source: Payor: [employer] / Plan: [employer] / Product Type: PPO /     Follow-up plan:  Future Appointments  Date Time Provider Department Center   3/16/2018 9:30 AM Darvin Tang MD [facility]       Key Recommendations:  Please review AVS    Carrie KHALILN RN PHN  Patient Care Management Coordinator  [facility] [place] [address] [facility] [address]  Phone: [phone] / Pager: [phone]      AVS/Discharge Summary is the source of truth; this is a helpful guide for improved communication of patient story

## 2018-01-07 NOTE — LETTER
January 20, 2018       Mr Nicholson was admitted to the hospital 1/7 -1/20.  His illness was significant but he is now slowly recovering. He should not return to work until 1/29/18 at earliest. He should be seen by primary care doctor this coming week to be cleared for restarting work.    Naresh Aguayo MD  IM/Peds Hospitalist - [id number]      RE: Murray Nicholson  [address]  [place] [address]

## 2018-01-07 NOTE — IP AVS SNAPSHOT
MRN:[id number]                      After Visit Summary   1/7/2018    Murray Nicholson    MRN: [id number]           Thank you!     Thank you for choosing [facility] for your care. Our goal is always to provide you with excellent care. Hearing back from our patients is one way we can continue to improve our services. Please take a few minutes to complete the written survey that you may receive in the mail after you visit with us. Thank you!        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 Transitioned to HD and has run 4 times. Plan for 1 more week of antifungal (fluconazole) and flagyl for antibiotics.                  Who to Call     For medical emergencies, please call 911.  For non-urgent questions about your medical care, please call your primary care provider or clinic, [phone]  For questions related to your surgery, please call your surgery clinic        Attending Provider     Provider Specialty    Yobany Dyson MD Emergency Medicine    Corby, Emilia Mukherjee MD Internal Medicine    Ritesh Roper MD Internal Medicine    , MD Breanne Internal Medicine    Naresh Aguayo MD Internal Medicine       Primary Care Provider Office Phone # Fax #    Ana Luisa Margarita Mcpherson [phone]       When to contact your care team       Call your primary doctor if you have any of the following: temperature greater than 100 or less than 95,  increased shortness of breath, increased drainage, increased swelling, increased pain or any new or concerning symptoms.                  After Care Instructions     Activity       Your activity upon discharge: activity as tolerated - no work for  1  week or until cleared by PCP            Diet       Follow this diet upon discharge: Orders Placed This Encounter      Regular Diet Adult            Tubes and drains       You are going home with the following tubes or drains: HD Catheter. HD Catheter care per [facility].                  Follow-up Appointments     Adult [facility] Follow-up and recommended labs and tests       Follow up with primary care provider, Ana Luisa Mcpherson, within 7 days for hospital follow- up.     Follow up with Dr Mcpherson in nephrology    Appointments on [place] and/or [facility] (with [facility] or [facility] provider or service). Call [phone] if you haven't heard regarding these appointments within 7 days of discharge.            Follow Up and recommended labs and tests       [facility] Sat, Tues, Thurs                  Your next 10 appointments already scheduled     Mar 16, 2018  9:30 AM CDT   (Arrive by 9:15 AM)   Return Visit with Darvin Tang MD   [facility] ([facility])    [address] [place] [address]  [address] [place] [id number] [phone]              Further instructions from your care team       Outpatient Hemodialysis (HD) Information:      Site:  [facility]  Address:  [address] [place] [address]  Phone number:  [phone]  Schedule:  Tuesday, Thursday, and Saturday morning at 6:15AM.    Next outpatient HD run:  Tuesday, January 23rd, 2018 at 6:15AM.  Please arrive at 6:00AM on the first day to complete paperwork.    Pending Results     Date and Time Order Name Status Description    1/15/2018 1704 Fungus Culture, non-blood Preliminary     1/15/2018 1704 Anaerobic bacterial culture Preliminary     1/15/2018 0000 Fungus Culture, non-blood Preliminary             Statement of Approval     Ordered          01/20/18 1511  I have reviewed and agree with all the recommendations and orders detailed in this document.  EFFECTIVE NOW      Approved and electronically signed by:  Naresh Aguayo MD             Admission Information     Date & Time Provider Department Dept. Phone    1/7/2018 Naresh Aguayo MD [address] [facility] [phone]      Your Vitals Were     Blood Pressure Pulse Temperature Respirations Weight Pulse Oximetry    116/67 (BP Location: Left arm) 105 97.6  F (36.4  C) (Oral) 18 80.2 kg (176 lb 14.4 oz) 97%    BMI (Body Mass Index)                   26.12 kg/m2           MyChart Information     [facility] gives you secure access to your electronic health record. If you see a primary care provider, you can also send messages to your care team and make appointments. If you have questions, please call your primary care clinic.  If you do not have a primary care provider, please call [phone] and they will assist you.        Care EveryWhere ID     This is your Care EveryWhere ID. This could be used by other organizations to access your [facility] medical records  [id number]        Equal Access to Services     JOHN HAUSER AH: Hadii aad ku hadasho Sotracey, waaxda luqadaha, qaybta kaalmada adeegyada, jose palencia . So [facility] [phone].    ATENCIÓN: Si habla español, tiene a ortiz disposición servicios gratuitos de asistencia lingüística. Llame al [phone].    We comply with applicable federal civil rights laws and [place] laws. We do not discriminate on the basis of race, color, national origin, age, disability, sex, sexual orientation, or gender identity.               Review of your medicines      START taking        Dose / Directions    fluconazole 200 MG tablet   Commonly known as:  DIFLUCAN   Indication:  Candida periotonitis        Dose:  400 mg   Start taking on:  1/21/2018   Take 2 tablets (400 mg) by mouth daily for 7 days   Quantity:  14 tablet   Refills:  0       metroNIDAZOLE 500 MG tablet   Commonly known as:  FLAGYL   Indication:  Infection Within the Abdomen        Dose:  500 mg    Start taking on:  1/21/2018   Take 1 tablet (500 mg) by mouth every 6 hours for 7 days   Quantity:  28 tablet   Refills:  0       order for DME   Used for:  CKD (chronic kidney disease) stage 5, GFR less than 15 ml/min (H)   Notes to Patient:  You are no longer on PD, you do not need a commode.        Equipment being ordered: Commode () Treatment Diagnosis: ESRD on PD Pt has to be connected to PD all night and can not be disconnected, hence impending his mobility to go to the bathroom. At risk for infection if he does not have this equipment.   Quantity:  1 Units   Refills:  0       traMADol 50 MG tablet   Commonly known as:  ULTRAM   Used for:  Abdominal pain, generalized        Dose:  50 mg   Take 1 tablet (50 mg) by mouth every 6 hours as needed for moderate pain   Quantity:  50 tablet   Refills:  0         CONTINUE these medicines which may have CHANGED, or have new prescriptions. If we are uncertain of the size of tablets/capsules you have at home, strength may be listed as something that might have changed.        Dose / Directions    fluticasone 50 MCG/ACT spray   Commonly known as:  FLONASE   This may have changed:  how much to take   Used for:  Nasal congestion        Dose:  1-2 spray   Spray 1-2 sprays into both nostrils daily   Quantity:  16 g   Refills:  11       spironolactone 100 MG tablet   Commonly known as:  ALDACTONE   This may have changed:  how much to take   Used for:  Hypokalemia, Chronic systolic congestive heart failure (H)        Dose:  50 mg   Take 0.5 tablets (50 mg) by mouth daily   Quantity:  30 tablet   Refills:  11         CONTINUE these medicines which have NOT CHANGED        Dose / Directions    ACETAMINOPHEN PO        Dose:  500-1000 mg   Take 500-1,000 mg by mouth nightly as needed for pain   Refills:  0       albuterol 108 (90 BASE) MCG/ACT Inhaler   Commonly known as:  PROAIR HFA/PROVENTIL HFA/VENTOLIN HFA   Used for:  Cough        Dose:  2 puff   Inhale 2 puffs into the  lungs every 6 hours as needed for shortness of breath / dyspnea or wheezing   Quantity:  1 Inhaler   Refills:  0       * allopurinol 100 MG tablet   Commonly known as:  ZYLOPRIM   Used for:  Chronic gout of wrist, unspecified cause, unspecified laterality, Gout, unspecified cause, unspecified chronicity, unspecified site        Dose:  100 mg   Take 1 tablet (100 mg) by mouth daily Take with 300 mg tabs for 400 mg /day total.   Quantity:  30 tablet   Refills:  2       * allopurinol 300 MG tablet   Commonly known as:  ZYLOPRIM   Used for:  Chronic gout of wrist, unspecified cause, unspecified laterality, Gout, unspecified cause, unspecified chronicity, unspecified site        Dose:  300 mg   Take 1 tablet (300 mg) by mouth daily Take with 100 mg tabs for 400 mg /day total.   Quantity:  30 tablet   Refills:  2       amLODIPine 5 MG tablet   Commonly known as:  NORVASC   Used for:  Hypertension goal BP (blood pressure) < 130/80        Dose:  5 mg   Take 1 tablet (5 mg) by mouth daily   Quantity:  90 tablet   Refills:  0       aspirin 81 MG tablet        Take 1 tablet (81 mg) by mouth at bedtime   Refills:  0       atorvastatin 40 MG tablet   Commonly known as:  LIPITOR   Used for:  CKD (chronic kidney disease) stage 3, GFR 30-59 ml/min, Hyperlipidemia LDL goal <100        Dose:  40 mg   Take 1 tablet (40 mg) by mouth daily   Quantity:  90 tablet   Refills:  3       B-COMPLEX PO        Dose:  1 tablet   Take 1 tablet by mouth daily   Refills:  0       bismuth subsalicylate 262 MG/15ML suspension   Commonly known as:  PEPTO BISMOL        Dose:  15 mL   Take 15 mLs by mouth every 6 hours as needed for indigestion   Refills:  0       calcitRIOL 0.25 MCG capsule   Commonly known as:  ROCALTROL   Used for:  Hypocalcemia        Dose:  0.25 mcg   Take 1 capsule (0.25 mcg) by mouth daily   Quantity:  90 capsule   Refills:  0       calcium acetate (Phos Binder) 667 MG Tabs   Used for:  Hyperphosphatemia        Dose:  1 tablet    Take 1 tablet by mouth 3 times daily (with meals)   Quantity:  180 tablet   Refills:  3       gentamicin 0.1 % cream   Commonly known as:  GARAMYCIN        Apply topically daily as needed   Refills:  0       hydrALAZINE 50 MG tablet   Commonly known as:  APRESOLINE   Used for:  Type 2 diabetes mellitus with stage 5 chronic kidney disease not on chronic dialysis, without long-term current use of insulin (H)   Notes to Patient:  On 1/20 you had dialysis which moved your medications to later times. On 1/21 take at normal scheduled times (8am, 2pm, 8pm).         Dose:  50 mg   Take 1 tablet (50 mg) by mouth 3 times daily   Quantity:  270 tablet   Refills:  3       isosorbide mononitrate 60 MG 24 hr tablet   Commonly known as:  IMDUR   Used for:  Chronic systolic congestive heart failure (H)        Dose:  60 mg   Take 1 tablet (60 mg) by mouth daily   Quantity:  90 tablet   Refills:  3       loratadine 10 MG tablet   Commonly known as:  CLARITIN   Used for:  Hypertensive cardiopathy, SOB (shortness of breath)        Dose:  10 mg   Take 10 mg by mouth daily as needed Reported on 5/3/2017   Refills:  0       losartan 100 MG tablet   Commonly known as:  COZAAR   Used for:  Renal hypertension, stage 1-4 or unspecified chronic kidney disease        TAKE 1 TABLET BY MOUTH DAILY   Quantity:  90 tablet   Refills:  0       metoprolol succinate 100 MG 24 hr tablet   Commonly known as:  TOPROL XL   Used for:  Chronic systolic congestive heart failure (H)        Dose:  100 mg   Take 1 tablet (100 mg) by mouth daily   Quantity:  90 tablet   Refills:  3       omeprazole 20 MG CR capsule   Commonly known as:  priLOSEC        Dose:  20 mg   Take 20 mg by mouth daily At night   Refills:  0       * predniSONE 5 MG tablet   Commonly known as:  DELTASONE   Used for:  Gout, unspecified cause, unspecified chronicity, unspecified site, Chronic gout of wrist, unspecified cause, unspecified laterality        Dose:  5 mg   Take 1 tablet (5 mg)  by mouth daily   Quantity:  90 tablet   Refills:  1       * predniSONE 20 MG tablet   Commonly known as:  DELTASONE   Used for:  Gout, unspecified cause, unspecified chronicity, unspecified site        Dose:  40 mg   Take 2 tablets (40 mg) by mouth daily for 3 days for gout flares   Quantity:  30 tablet   Refills:  2       torsemide 100 MG tablet   Commonly known as:  DEMADEX   Used for:  Other hypervolemia        Dose:  100 mg   Take 1 tablet (100 mg) by mouth 2 times daily   Quantity:  60 tablet   Refills:  11       * Notice:  This list has 4 medication(s) that are the same as other medications prescribed for you. Read the directions carefully, and ask your doctor or other care provider to review them with you.         Where to get your medicines      These medications were sent to [facility] [place], [place] - [address] [address]     Phone:  [phone]     fluconazole 200 MG tablet    metroNIDAZOLE 500 MG tablet         Some of these will need a paper prescription and others can be bought over the counter. Ask your nurse if you have questions.     Bring a paper prescription for each of these medications     order for DME    traMADol 50 MG tablet               ANTIBIOTIC INSTRUCTION     You've Been Prescribed an Antibiotic - Now What?  Your healthcare team thinks that you or your loved one might have an infection. Some infections can be treated with antibiotics, which are powerful, life-saving drugs. Like all medications, antibiotics have side effects and should only be used when necessary. There are some important things you should know about your antibiotic treatment.      Your healthcare team may run tests before you start taking an antibiotic.    Your team may take samples (e.g., from your blood, urine or other areas) to run tests to look for bacteria. These test can be important to determine if you need an antibiotic at all and, if you do, which  antibiotic will work best.      Within a few days, your healthcare team might change or even stop your antibiotic.    Your team may start you on an antibiotic while they are working to find out what is making you sick.    Your team might change your antibiotic because test results show that a different antibiotic would be better to treat your infection.    In some cases, once your team has more information, they learn that you do not need an antibiotic at all. They may find out that you don't have an infection, or that the antibiotic you're taking won't work against your infection. For example, an infection caused by a virus can't be treated with antibiotics. Staying on an antibiotic when you don't need it is more likely to be harmful than helpful.      You may experience side effects from your antibiotic.    Like all medications, antibiotics have side effects. Some of these can be serious.    Let you healthcare team know if you have any known allergies when you are admitted to the hospital.    One significant side effect of nearly all antibiotics is the risk of severe and sometimes deadly diarrhea caused by Clostridium difficile (C. Difficile). This occurs when a person takes antibiotics because some good germs are destroyed. Antibiotic use allows C. diificile to take over, putting patients at high risk for this serious infection.    As a patient or caregiver, it is important to understand your or your loved one's antibiotic treatment. It is especially important for caregivers to speak up when patients can't speak for themselves. Here are some important questions to ask your healthcare team.    What infection is this antibiotic treating and how do you know I have that infection?    What side effects might occur from this antibiotic?    How long will I need to take this antibiotic?    Is it safe to take this antibiotic with other medications or supplements (e.g., vitamins) that I am taking?     Are there any special  directions I need to know about taking this antibiotic? For example, should I take it with food?    How will I be monitored to know whether my infection is responding to the antibiotic?    What tests may help to make sure the right antibiotic is prescribed for me?      Information provided by:  www.cdc.gov/getsmart  U.S. Department of Health and Human Services  Centers for disease Control and Prevention  National Center for Emerging and Zoonotic Infectious Diseases  Division of Healthcare Quality Promotion         Protect others around you: Learn how to safely use, store and throw away your medicines at www.disposemymeds.org.             Medication List: This is a list of all your medications and when to take them. Check marks below indicate your daily home schedule. Keep this list as a reference.      Medications           Morning Afternoon Evening Bedtime As Needed    ACETAMINOPHEN PO   Take 500-1,000 mg by mouth nightly as needed for pain   Last time this was given:  650 mg on 1/20/2018  3:56 AM                                   albuterol 108 (90 BASE) MCG/ACT Inhaler   Commonly known as:  PROAIR HFA/PROVENTIL HFA/VENTOLIN HFA   Inhale 2 puffs into the lungs every 6 hours as needed for shortness of breath / dyspnea or wheezing   Last time this was given:  2 puffs on 1/19/2018  8:05 AM                                   * allopurinol 100 MG tablet   Commonly known as:  ZYLOPRIM   Take 1 tablet (100 mg) by mouth daily Take with 300 mg tabs for 400 mg /day total.   Last time this was given:  400 mg on 1/20/2018 12:39 PM            8 am                       * allopurinol 300 MG tablet   Commonly known as:  ZYLOPRIM   Take 1 tablet (300 mg) by mouth daily Take with 100 mg tabs for 400 mg /day total.   Last time this was given:  400 mg on 1/20/2018 12:39 PM            8 am                       amLODIPine 5 MG tablet   Commonly known as:  NORVASC   Take 1 tablet (5 mg) by mouth daily   Last time this was given:  5 mg  on 1/20/2018 12:39 PM            8 am                       aspirin 81 MG tablet   Take 1 tablet (81 mg) by mouth at bedtime                        10 pm           atorvastatin 40 MG tablet   Commonly known as:  LIPITOR   Take 1 tablet (40 mg) by mouth daily   Last time this was given:  40 mg on 1/19/2018  8:20 PM            8 am                       B-COMPLEX PO   Take 1 tablet by mouth daily            8 am                       bismuth subsalicylate 262 MG/15ML suspension   Commonly known as:  PEPTO BISMOL   Take 15 mLs by mouth every 6 hours as needed for indigestion                                   calcitRIOL 0.25 MCG capsule   Commonly known as:  ROCALTROL   Take 1 capsule (0.25 mcg) by mouth daily   Last time this was given:  0.25 mcg on 1/20/2018 12:40 PM            8 am                       calcium acetate (Phos Binder) 667 MG Tabs   Take 1 tablet by mouth 3 times daily (with meals)            With breakfast       With lunch       With dinner               fluconazole 200 MG tablet   Commonly known as:  DIFLUCAN   Take 2 tablets (400 mg) by mouth daily for 7 days   Start taking on:  1/21/2018   Last time this was given:  800 mg on 1/20/2018  3:58 PM                4 pm                   fluticasone 50 MCG/ACT spray   Commonly known as:  FLONASE   Spray 1-2 sprays into both nostrils daily   Last time this was given:  2 sprays on 1/20/2018 12:40 PM            8 am                       gentamicin 0.1 % cream   Commonly known as:  GARAMYCIN   Apply topically daily as needed   Last time this was given:  1/14/2018 10:27 PM                                   hydrALAZINE 50 MG tablet   Commonly known as:  APRESOLINE   Take 1 tablet (50 mg) by mouth 3 times daily   Last time this was given:  25 mg on 1/20/2018 12:40 PM   Next Dose Due:  1/20/19 @ 6pm then 12 am     Notes to Patient:  On 1/20 you had dialysis which moved your medications to later times. On 1/21 take at normal scheduled times (8am, 2pm, 8pm).              8 am       2 pm       8 pm               isosorbide mononitrate 60 MG 24 hr tablet   Commonly known as:  IMDUR   Take 1 tablet (60 mg) by mouth daily            8 am                       loratadine 10 MG tablet   Commonly known as:  CLARITIN   Take 10 mg by mouth daily as needed Reported on 5/3/2017                                   losartan 100 MG tablet   Commonly known as:  COZAAR   TAKE 1 TABLET BY MOUTH DAILY   Last time this was given:  100 mg on 1/20/2018 12:40 PM            8 am                       metoprolol succinate 100 MG 24 hr tablet   Commonly known as:  TOPROL XL   Take 1 tablet (100 mg) by mouth daily   Last time this was given:  100 mg on 1/20/2018 12:40 PM            8 am                       metroNIDAZOLE 500 MG tablet   Commonly known as:  FLAGYL   Take 1 tablet (500 mg) by mouth every 6 hours for 7 days   Start taking on:  1/21/2018   Last time this was given:  500 mg on 1/20/2018 12:50 PM            8 am       12 pm       4 pm       8 pm           omeprazole 20 MG CR capsule   Commonly known as:  priLOSEC   Take 20 mg by mouth daily At night   Last time this was given:  20 mg on 1/20/2018 12:40 PM            8 am                       order for DME   Equipment being ordered: Commode () Treatment Diagnosis: ESRD on PD Pt has to be connected to PD all night and can not be disconnected, hence impending his mobility to go to the bathroom. At risk for infection if he does not have this equipment.   Notes to Patient:  You are no longer on PD, you do not need a commode.                                * predniSONE 5 MG tablet   Commonly known as:  DELTASONE   Take 1 tablet (5 mg) by mouth daily   Last time this was given:  5 mg on 1/20/2018 12:40 PM            8 am                       * predniSONE 20 MG tablet   Commonly known as:  DELTASONE   Take 2 tablets (40 mg) by mouth daily for 3 days for gout flares   Last time this was given:  5 mg on 1/20/2018 12:40 PM            8 am                        spironolactone 100 MG tablet   Commonly known as:  ALDACTONE   Take 0.5 tablets (50 mg) by mouth daily   Last time this was given:  50 mg on 1/20/2018  9:37 AM            8 am                       torsemide 100 MG tablet   Commonly known as:  DEMADEX   Take 1 tablet (100 mg) by mouth 2 times daily   Last time this was given:  100 mg on 1/20/2018 12:40 PM            8 am           8 pm               traMADol 50 MG tablet   Commonly known as:  ULTRAM   Take 1 tablet (50 mg) by mouth every 6 hours as needed for moderate pain   Last time this was given:  50 mg on 1/20/2018  3:56 AM                                   * Notice:  This list has 4 medication(s) that are the same as other medications prescribed for you. Read the directions carefully, and ask your doctor or other care provider to review them with you.

## 2018-01-07 NOTE — IP AVS SNAPSHOT
[address] [address]    Phone:  [phone]                                       After Visit Summary   1/7/2018    Murray Nicholson    MRN: [id number]           After Visit Summary Signature Page     I have received my discharge instructions, and my questions have been answered. I have discussed any challenges I see with this plan with the nurse or doctor.    ..........................................................................................................................................  Patient/Patient Representative Signature      ..........................................................................................................................................  Patient Representative Print Name and Relationship to Patient    ..................................................               ................................................  Date                                            Time    ..........................................................................................................................................  Reviewed by Signature/Title    ...................................................              ..............................................  Date                                                            Time

## 2018-01-07 NOTE — ED NOTES
Suffering from abd pain related to peritonitis, is taking antibiotics but not improving. Does nightly peritoneal dialysis

## 2018-01-07 NOTE — ED PROVIDER NOTES
[place] EMERGENCY DEPARTMENT ([facility])  1/07/18 [facility] 4:45 PM   History     Chief Complaint   Patient presents with     Abdominal Pain     The history is provided by the patient and medical records.     Murray Nicholson is a 63 year old with history male who presents with abdominal pain, cloudy dialysate, low grade fevers and cough for the past 9 days. He has a history of MGUS with subsequent kidney failure s/p peritoneal dialysis catheter placed on 6/22/17 by Dr. Arvizu and initiation of peritoneal dialysis in August 2017. He dialyzes daily at home through PD catheter. A week and a half ago he developed some abdominal pain and cloudy dialysate that concerned him for peritonitis. He went in to [facility] and they prescribed him ceftazidime to mix into his dialysate. He has been taking ceftazidime for the 9 days via dialysate. He initially thought the antibiotics were working as the dialysate began to clear up and his abdominal fpain improved. Around yesterday he noticed the dialysate was cloudy (drained from overnight Friday). He went into work, was getting out of his car when he had to use the bathroom. He had fecal incontinence at work much to his chagrin. He tried to clean up as best he could and went home, then went to sleep. He got up at 6pm and talked to the nurse who told him to go to the ER. He did drain some dialysate prior to come to the ER.  This entire week he has been doing a manual bag daily with the antibiotic in it. He put in 2 liters, only got 1300 ml back, progressively lower volumes of return fluid to 100 mL even. He did not infuse antibiotics today as the nurse recommended he go to the ER and we would manage his antibiotic course. He continues to have abdominal pain that worsens with any movement. He checks his blood pressure and temperature prior to dialysis and noticed elevated temperature to 100  F. He hasn't had much of an appetite the past few days.  He did eat  egg and a bagel prior to arrival. This is the first time he ever had any problems related to PD. He notes having fevers to 100.9  F at home and has had a cough.     Patient accompanied by son Jordi.     I have reviewed the Medications, Allergies, Past Medical and Surgical History, and Social History in the Epic system.    Review of Systems   ROS: 14 point ROS neg other than the symptoms noted above in the HPI.     Physical Exam   BP: 106/60  Pulse: 104  Temp: 98.2  F (36.8  C)  Resp: 16  Weight: 89.7 kg (197 lb 11.2 oz)  SpO2: 99 %      Physical Exam  Gen: NAD, sitting on stretcher, conversant and pleasant, non-toxic appearing  HEENT: NCAT, PERRL, EOMI, MMM  Neck: trachea midline, supple with FROM  Cardio: normal rate regular rhythm, no R/M/G, normally perfused and warm  Pulm: steady, non-labored respirations, normal WOB, CTAB, no w/r/r  Abd: soft diffusely mildly tender, PD cath in place and site looks healthy.  no organomegaly, no CVA tenderness  Ext: normal peripheral pulses, no edema, neurovascularly intact distally.  Skin: no rashes or signs of trauma  Neuro: no focal deficits, 5/5 strength in all ext    ED Course     ED Course     Procedures           Labs Ordered and Resulted from Time of ED Arrival Up to the Time of Departure from the ED   CBC WITH PLATELETS DIFFERENTIAL - Abnormal; Notable for the following:        Result Value    RBC Count 3.17 (*)     Hemoglobin 10.0 (*)     Hematocrit 31.4 (*)     RDW 17.6 (*)     Absolute Lymphocytes 0.5 (*)     All other components within normal limits   COMPREHENSIVE METABOLIC PANEL - Abnormal; Notable for the following:     Glucose 171 (*)     Urea Nitrogen 42 (*)     Creatinine 7.10 (*)     GFR Estimate 8 (*)     GFR Estimate If Black 10 (*)     Calcium 8.2 (*)     Albumin 1.8 (*)     Protein Total 6.0 (*)     All other components within normal limits   LACTIC ACID WHOLE BLOOD   NO   VITAL SIGNS   MEASURE WEIGHT   NURSING COMMUNICATION 1   PATIENT CARE ORDER   NO    BLOOD CULTURE   BLOOD CULTURE   CELL COUNT WITH DIFFERENTIAL FLUID   FLUID CULTURE AEROBIC BACTERIAL   GRAM STAIN            Assessments & Plan (with Medical Decision Making)     This is a 63-year-old male with a history of CHF as well as CKD on peritoneal dialysis who has been treated for the past 8 days for spontaneous bacterial peritonitis as an outpatient with peritoneally-delivered ceftaz  He notes that after starting this regimen his dialysate fluid became clear and his belly pain improved however over the past 2 days his fluid has got much more cloudy and has gotten a much more painful abdomen.  He has any he has not had any measured fevers up to this point but is been feeling unwell and wanted to evaluated. On exam he is well-appearing with normal vital signs and is afebrile however his abdomen is diffusely tender.  Differential includes worsening spontaneous bacterial peritonitis versus sepsis. Low suspicion for acute intra-abdominal or surgical pathologies. After history and physical exam basic labs were obtained including a chemistry and a CBC, both of which were unremarkable. Lactate was negative and I have low suspicion for sepsis at this time but blood cultures were drawn. I spoke with the renal fellow who recommended accessing his peritoneal dialysis catheter and obtaining fluid to be sent for culture. The Nephrology fellow was able to find that the previous cultures grew out E. Coli, Pseudomonas as well and a streptococcal strain. The E. coli and pseudomonas were sensitive to ceftazidime, however the strep did not grow enough to get sensitivities. Cell counts were not done but there were white blood cells at that time. The Nephrology fellow was at the bedside when the fluid was aspirated from his peritoneal dialysis catheter and she noted that it was extremely cloudy and very concerning for SBP and that she wanted into treat empirically given his worsening abdominal pain and the character of the  fluid.  They recommended doing IV ceftazidime as well as adding on IV vancomycin and sent the peritoneal dialysis fluid for cell counts, stain, and culture.  I spoke with the Hospitalist service who was able to accept the patient under their care with Nephrology following along for  peritoneal dialysis recommendations. He is admitted to the floor at this time in good condition.     This part of the document was transcribed by Елена Flaherty Medical Scribe.      I have reviewed the nursing notes.    I have reviewed the findings, diagnosis, plan and need for follow up with the patient.    New Prescriptions    No medications on file       Final diagnoses:   SBP (spontaneous bacterial peritonitis) (H)   I, Елена Flaherty, am serving as a trained medical scribe to document services personally performed by Yobany Dyson, based on the provider's statements to me on January 7, 2018.  This document has been checked and approved by the attending provider.    Yobany ALLEN MD, was physically present and have reviewed and verified the accuracy of this note documented by Елена Flaherty medical scribe.       1/7/2018   [facility], [place], EMERGENCY DEPARTMENT     Yobany Dyson MD  01/07/18 0691

## 2018-01-08 ENCOUNTER — APPOINTMENT (OUTPATIENT)
Dept: CT IMAGING | Facility: CLINIC | Age: 63
DRG: 981 | End: 2018-01-08
Payer: COMMERCIAL

## 2018-01-08 LAB
ANION GAP SERPL CALCULATED.3IONS-SCNC: 10 MMOL/L (ref 3–14)
BASOPHILS # BLD AUTO: 0 10E9/L (ref 0–0.2)
BASOPHILS NFR BLD AUTO: 0.2 %
BUN SERPL-MCNC: 42 MG/DL (ref 7–30)
CALCIUM SERPL-MCNC: 7.6 MG/DL (ref 8.5–10.1)
CHLORIDE SERPL-SCNC: 99 MMOL/L (ref 94–109)
CO2 SERPL-SCNC: 29 MMOL/L (ref 20–32)
CREAT SERPL-MCNC: 6.91 MG/DL (ref 0.66–1.25)
DIFFERENTIAL METHOD BLD: ABNORMAL
EOSINOPHIL # BLD AUTO: 0.2 10E9/L (ref 0–0.7)
EOSINOPHIL NFR BLD AUTO: 4.4 %
ERYTHROCYTE [DISTWIDTH] IN BLOOD BY AUTOMATED COUNT: 17.4 % (ref 10–15)
GFR SERPL CREATININE-BSD FRML MDRD: 8 ML/MIN/1.7M2
GLUCOSE BLDC GLUCOMTR-MCNC: 126 MG/DL (ref 70–99)
GLUCOSE BLDC GLUCOMTR-MCNC: 150 MG/DL (ref 70–99)
GLUCOSE BLDC GLUCOMTR-MCNC: 165 MG/DL (ref 70–99)
GLUCOSE BLDC GLUCOMTR-MCNC: 179 MG/DL (ref 70–99)
GLUCOSE SERPL-MCNC: 194 MG/DL (ref 70–99)
HBA1C MFR BLD: 9.1 % (ref 4.3–6)
HCT VFR BLD AUTO: 28.9 % (ref 40–53)
HGB BLD-MCNC: 9 G/DL (ref 13.3–17.7)
IMM GRANULOCYTES # BLD: 0 10E9/L (ref 0–0.4)
IMM GRANULOCYTES NFR BLD: 0.5 %
LYMPHOCYTES # BLD AUTO: 0.6 10E9/L (ref 0.8–5.3)
LYMPHOCYTES NFR BLD AUTO: 13.5 %
MCH RBC QN AUTO: 30.8 PG (ref 26.5–33)
MCHC RBC AUTO-ENTMCNC: 31.1 G/DL (ref 31.5–36.5)
MCV RBC AUTO: 99 FL (ref 78–100)
MONOCYTES # BLD AUTO: 0.2 10E9/L (ref 0–1.3)
MONOCYTES NFR BLD AUTO: 5.3 %
NEUTROPHILS # BLD AUTO: 3.3 10E9/L (ref 1.6–8.3)
NEUTROPHILS NFR BLD AUTO: 76.1 %
NRBC # BLD AUTO: 0 10*3/UL
NRBC BLD AUTO-RTO: 0 /100
PLATELET # BLD AUTO: 256 10E9/L (ref 150–450)
POTASSIUM SERPL-SCNC: 3.3 MMOL/L (ref 3.4–5.3)
RADIOLOGIST FLAGS: ABNORMAL
RBC # BLD AUTO: 2.92 10E12/L (ref 4.4–5.9)
SODIUM SERPL-SCNC: 138 MMOL/L (ref 133–144)
WBC # BLD AUTO: 4.4 10E9/L (ref 4–11)

## 2018-01-08 PROCEDURE — 12000008 ZZH R&B INTERMEDIATE UMMC

## 2018-01-08 PROCEDURE — 80048 BASIC METABOLIC PNL TOTAL CA: CPT | Performed by: STUDENT IN AN ORGANIZED HEALTH CARE EDUCATION/TRAINING PROGRAM

## 2018-01-08 PROCEDURE — 25000131 ZZH RX MED GY IP 250 OP 636 PS 637: Performed by: STUDENT IN AN ORGANIZED HEALTH CARE EDUCATION/TRAINING PROGRAM

## 2018-01-08 PROCEDURE — 40000095 ZZH STATISTIC LEVEL 2 EST PATIENT

## 2018-01-08 PROCEDURE — 85025 COMPLETE CBC W/AUTO DIFF WBC: CPT | Performed by: STUDENT IN AN ORGANIZED HEALTH CARE EDUCATION/TRAINING PROGRAM

## 2018-01-08 PROCEDURE — 74176 CT ABD & PELVIS W/O CONTRAST: CPT

## 2018-01-08 PROCEDURE — 00000146 ZZHCL STATISTIC GLUCOSE BY METER IP

## 2018-01-08 PROCEDURE — 25000128 H RX IP 250 OP 636: Performed by: STUDENT IN AN ORGANIZED HEALTH CARE EDUCATION/TRAINING PROGRAM

## 2018-01-08 PROCEDURE — 25000132 ZZH RX MED GY IP 250 OP 250 PS 637: Performed by: HOSPITALIST

## 2018-01-08 PROCEDURE — 99233 SBSQ HOSP IP/OBS HIGH 50: CPT | Mod: GC | Performed by: HOSPITALIST

## 2018-01-08 PROCEDURE — 83036 HEMOGLOBIN GLYCOSYLATED A1C: CPT | Performed by: STUDENT IN AN ORGANIZED HEALTH CARE EDUCATION/TRAINING PROGRAM

## 2018-01-08 PROCEDURE — 36415 COLL VENOUS BLD VENIPUNCTURE: CPT | Performed by: STUDENT IN AN ORGANIZED HEALTH CARE EDUCATION/TRAINING PROGRAM

## 2018-01-08 PROCEDURE — 25000132 ZZH RX MED GY IP 250 OP 250 PS 637: Performed by: STUDENT IN AN ORGANIZED HEALTH CARE EDUCATION/TRAINING PROGRAM

## 2018-01-08 PROCEDURE — 25000132 ZZH RX MED GY IP 250 OP 250 PS 637: Performed by: INTERNAL MEDICINE

## 2018-01-08 PROCEDURE — 27210995 ZZH RX 272: Performed by: STUDENT IN AN ORGANIZED HEALTH CARE EDUCATION/TRAINING PROGRAM

## 2018-01-08 PROCEDURE — 25000128 H RX IP 250 OP 636: Performed by: EMERGENCY MEDICINE

## 2018-01-08 PROCEDURE — 27210995 ZZH RX 272: Performed by: INTERNAL MEDICINE

## 2018-01-08 RX ORDER — ALBUTEROL SULFATE 90 UG/1
2 AEROSOL, METERED RESPIRATORY (INHALATION) EVERY 6 HOURS PRN
Status: DISCONTINUED | OUTPATIENT
Start: 2018-01-08 | End: 2018-01-20 | Stop reason: HOSPADM

## 2018-01-08 RX ORDER — ACETAMINOPHEN 325 MG/1
650 TABLET ORAL EVERY 4 HOURS PRN
Status: DISCONTINUED | OUTPATIENT
Start: 2018-01-08 | End: 2018-01-20 | Stop reason: HOSPADM

## 2018-01-08 RX ORDER — ALLOPURINOL 300 MG/1
300 TABLET ORAL DAILY
Status: DISCONTINUED | OUTPATIENT
Start: 2018-01-08 | End: 2018-01-08 | Stop reason: DRUGHIGH

## 2018-01-08 RX ORDER — NICOTINE POLACRILEX 4 MG
15-30 LOZENGE BUCCAL
Status: DISCONTINUED | OUTPATIENT
Start: 2018-01-08 | End: 2018-01-20 | Stop reason: HOSPADM

## 2018-01-08 RX ORDER — AMLODIPINE BESYLATE 5 MG/1
5 TABLET ORAL DAILY
Status: DISCONTINUED | OUTPATIENT
Start: 2018-01-08 | End: 2018-01-20 | Stop reason: HOSPADM

## 2018-01-08 RX ORDER — METOPROLOL SUCCINATE 100 MG/1
100 TABLET, EXTENDED RELEASE ORAL DAILY
Status: DISCONTINUED | OUTPATIENT
Start: 2018-01-08 | End: 2018-01-20 | Stop reason: HOSPADM

## 2018-01-08 RX ORDER — CALCITRIOL 0.25 UG/1
0.25 CAPSULE, LIQUID FILLED ORAL DAILY
Status: DISCONTINUED | OUTPATIENT
Start: 2018-01-08 | End: 2018-01-20 | Stop reason: HOSPADM

## 2018-01-08 RX ORDER — LOSARTAN POTASSIUM 50 MG/1
100 TABLET ORAL DAILY
Status: DISCONTINUED | OUTPATIENT
Start: 2018-01-08 | End: 2018-01-20 | Stop reason: HOSPADM

## 2018-01-08 RX ORDER — ASPIRIN 81 MG/1
81 TABLET, CHEWABLE ORAL DAILY
Status: DISCONTINUED | OUTPATIENT
Start: 2018-01-08 | End: 2018-01-20 | Stop reason: HOSPADM

## 2018-01-08 RX ORDER — DOCUSATE SODIUM 100 MG/1
100 CAPSULE, LIQUID FILLED ORAL 2 TIMES DAILY PRN
Status: DISCONTINUED | OUTPATIENT
Start: 2018-01-08 | End: 2018-01-20 | Stop reason: HOSPADM

## 2018-01-08 RX ORDER — TRAMADOL HYDROCHLORIDE 50 MG/1
50 TABLET ORAL EVERY 6 HOURS PRN
Status: DISCONTINUED | OUTPATIENT
Start: 2018-01-08 | End: 2018-01-20 | Stop reason: HOSPADM

## 2018-01-08 RX ORDER — DEXTROSE MONOHYDRATE 25 G/50ML
25-50 INJECTION, SOLUTION INTRAVENOUS
Status: DISCONTINUED | OUTPATIENT
Start: 2018-01-08 | End: 2018-01-20 | Stop reason: HOSPADM

## 2018-01-08 RX ORDER — CEFTAZIDIME 2 G/1
2 INJECTION, POWDER, FOR SOLUTION INTRAVENOUS
Status: DISCONTINUED | OUTPATIENT
Start: 2018-01-11 | End: 2018-01-09

## 2018-01-08 RX ORDER — BENZONATATE 100 MG/1
100 CAPSULE ORAL 3 TIMES DAILY PRN
Status: DISCONTINUED | OUTPATIENT
Start: 2018-01-08 | End: 2018-01-20 | Stop reason: HOSPADM

## 2018-01-08 RX ORDER — BUMETANIDE 2 MG/1
6 TABLET ORAL
Status: DISCONTINUED | OUTPATIENT
Start: 2018-01-08 | End: 2018-01-10

## 2018-01-08 RX ORDER — FLUTICASONE PROPIONATE 50 MCG
1-2 SPRAY, SUSPENSION (ML) NASAL DAILY
Status: DISCONTINUED | OUTPATIENT
Start: 2018-01-08 | End: 2018-01-20 | Stop reason: HOSPADM

## 2018-01-08 RX ORDER — CEFTAZIDIME 2 G/1
2 INJECTION, POWDER, FOR SOLUTION INTRAVENOUS EVERY 8 HOURS
Status: DISCONTINUED | OUTPATIENT
Start: 2018-01-08 | End: 2018-01-08

## 2018-01-08 RX ORDER — ATORVASTATIN CALCIUM 40 MG/1
40 TABLET, FILM COATED ORAL DAILY
Status: DISCONTINUED | OUTPATIENT
Start: 2018-01-08 | End: 2018-01-20 | Stop reason: HOSPADM

## 2018-01-08 RX ADMIN — TRAMADOL HYDROCHLORIDE 50 MG: 50 TABLET, COATED ORAL at 21:43

## 2018-01-08 RX ADMIN — CALCITRIOL 0.25 MCG: 0.25 CAPSULE, LIQUID FILLED ORAL at 09:04

## 2018-01-08 RX ADMIN — CALCIUM ACETATE 667 MG: 667 CAPSULE ORAL at 13:31

## 2018-01-08 RX ADMIN — ASPIRIN 81 MG CHEWABLE TABLET 81 MG: 81 TABLET CHEWABLE at 09:04

## 2018-01-08 RX ADMIN — LOSARTAN POTASSIUM 100 MG: 50 TABLET ORAL at 09:05

## 2018-01-08 RX ADMIN — METOPROLOL SUCCINATE 100 MG: 100 TABLET, EXTENDED RELEASE ORAL at 09:05

## 2018-01-08 RX ADMIN — DOCUSATE SODIUM 100 MG: 100 CAPSULE, LIQUID FILLED ORAL at 15:55

## 2018-01-08 RX ADMIN — FLUTICASONE PROPIONATE 2 SPRAY: 50 SPRAY, METERED NASAL at 09:15

## 2018-01-08 RX ADMIN — CALCIUM ACETATE 667 MG: 667 CAPSULE ORAL at 17:05

## 2018-01-08 RX ADMIN — AMLODIPINE BESYLATE 5 MG: 5 TABLET ORAL at 09:04

## 2018-01-08 RX ADMIN — HYDROMORPHONE HYDROCHLORIDE 0.5 MG: 1 INJECTION, SOLUTION INTRAMUSCULAR; INTRAVENOUS; SUBCUTANEOUS at 13:07

## 2018-01-08 RX ADMIN — BUMETANIDE 6 MG: 2 TABLET ORAL at 09:04

## 2018-01-08 RX ADMIN — OMEPRAZOLE 20 MG: 20 CAPSULE, DELAYED RELEASE ORAL at 09:15

## 2018-01-08 RX ADMIN — ATORVASTATIN CALCIUM 40 MG: 40 TABLET, FILM COATED ORAL at 21:43

## 2018-01-08 RX ADMIN — ACETAMINOPHEN 650 MG: 325 TABLET ORAL at 10:31

## 2018-01-08 RX ADMIN — INSULIN ASPART 1 UNITS: 100 INJECTION, SOLUTION INTRAVENOUS; SUBCUTANEOUS at 13:29

## 2018-01-08 RX ADMIN — CEFTAZIDIME 2 G: 2 INJECTION, POWDER, FOR SOLUTION INTRAVENOUS at 06:18

## 2018-01-08 RX ADMIN — TRAMADOL HYDROCHLORIDE 50 MG: 50 TABLET, COATED ORAL at 15:55

## 2018-01-08 RX ADMIN — INSULIN ASPART 1 UNITS: 100 INJECTION, SOLUTION INTRAVENOUS; SUBCUTANEOUS at 09:01

## 2018-01-08 RX ADMIN — BUMETANIDE 6 MG: 2 TABLET ORAL at 15:55

## 2018-01-08 RX ADMIN — SODIUM CHLORIDE, SODIUM LACTATE, CALCIUM CHLORIDE, MAGNESIUM CHLORIDE AND DEXTROSE: 2.5; 538; 448; 18.3; 5.08 INJECTION, SOLUTION INTRAPERITONEAL at 13:25

## 2018-01-08 RX ADMIN — ALLOPURINOL 400 MG: 300 TABLET ORAL at 09:04

## 2018-01-08 RX ADMIN — CALCIUM ACETATE 667 MG: 667 CAPSULE ORAL at 09:04

## 2018-01-08 ASSESSMENT — PAIN DESCRIPTION - DESCRIPTORS
DESCRIPTORS: ACHING
DESCRIPTORS: DISCOMFORT
DESCRIPTORS: ACHING

## 2018-01-08 NOTE — PROGRESS NOTES
PERITONEAL DIALYSIS CYCLER TREATMENT NOTE      Date:  1/7/2018  Time:  8:18 PM    Data:   Treatment Ultrafiltrate Volume mL:  Last dialyzed at home Saturday evening.  Did one manual exchange here in house to collect fluid for labs. Admitting this evening with TTV 8000mL   Total Duration of Therapy: 8hr   Fill Volume: 2000 mL  Heater Bag: Volume 6L and Dextrose Concentration 2.5%, Ca 2.5  Side Bag(s): Volume 6L  and Dextrose Concentration 2.5%, Ca 2.5  Total Number of Cycles: 4       Assessment:   PD Patient Response:   Has been tolerating well at home other than complaint of abdominal pain due to the peritonitis.  Dressing is CDI, catheter secured to abdomen with tape.    Interventions:   Cycler set with above prescription for this evening's therapy.   Pt will self connect once in room, waiting for room on 7C, getting XRay and IV antibiotics in ED now.       Plan:     Next tx per renal team.

## 2018-01-08 NOTE — PLAN OF CARE
Problem: Patient Care Overview  Goal: Individualization & Mutuality  Outcome: No Change  Temp max 99.2,heart rate 100's,other vitals stable.Complained of abdominal discomfort,prn tylenol given, pt said abdominal pain was not improving,pain comfortably managed with prn dilaudid x1 and warm heat.Pt on antibiotic for abdominal peritonitis.Blood glucose 165 and 179, novolg insulin given per correction scale,appetite good,no nausea..Patient seen by nephrology team,abdominal and pelvic CT ordered ,pt went down for procedure at change of shift.Pt to continue on PD,set up for tonight done by the PD nurse.Continue per plan of care.

## 2018-01-08 NOTE — PROGRESS NOTES
Patient arrived @ 22:40 from ED.  Oralia infusing.  States that pain is comfortable and getting better without intervention.  Up ad edith.  Awaiting order.

## 2018-01-08 NOTE — PROGRESS NOTES
Nephrology Progress Note  01/08/2018       ASSESSMENT AND RECOMMENDATIONS:   Murray Nicholson is a 63 year old male with a PMH of ESRD sec to likely DMII and HTN , CAD , HFrEF 20-25%, HLD, MGUS, SHEELA , AAA, Gout on prednisone and allopurinol, GERD, admitted with peritonitis diagnosed 12/29 outpatient and was treated with ceftazidime 1500mg intraperitoneal, presents with cloudy PD fluid , fatigue and abdominal pain.    # Peritonitis with polymicrobial infection  Repeat cytology and Cx sent on 1/7. Cx pending. Blood Cx NGTD.  Grossly cloudy PD fluid, follow on cell counts  - was on IP ceftazidime which is sensitive for the E.coli and pseudomonas  - strep no sensitivity done, continue vanco until cx are available  - would recommend abdominal CT  - Daily cell count from peritoneal fluid     # ESRD on PD since 06/2017  [facility] under Dr Mcpherson's care  PD prescription: 8hrs , 4 cycles , FV 2000, 2hr dwell , dextrose variable  UF on average 300-900  EDW 86Kg (190lbs)  Kt/V 1.5   - PD today with 2.5%    # Mild hypervolemia with edema  # Hypotensive   Will followwith PD , 2.5% dextrose for today     # Electrolytes  Stable     # MBD  Phos 7.2, (10/17)  Calcium 8.2, albumin 1.8  On phoslo and Vit D    # Anemia  Hb 10  On EPO 62156 units Qmonthly  Transfuse if <7  Iron replete per PTA labs     Recommendations were communicated to primary team.     Staffed with Dr. Otoole.     Betty Coffey MD, PhD  Internal medicine PGY-1  [phone]    Interval History :   In the last 24 hours Murray Nicholson has been doing well. Thinks his abdominal pain got somewhat better after vancomycin last night.  Review of Systems:   I reviewed the following systems:  GI: good appetite. No nausea or vomiting or diarrhea.   Neuro:  No confusion  Constitutional:  No fever or chills  CV: No dyspnea. Bilateral edema. No chest pain.    Physical Exam:   I/O last 3 completed shifts:  In: 120 [P.O.:120]  Out: 1275 [Urine:75; Other:1200]   /69  (BP Location: Left arm)  Pulse 105  Temp 99.3  F (37.4  C) (Oral)  Resp 18  Wt 89.7 kg (197 lb 11.2 oz)  SpO2 98%  BMI 29.2 kg/m2     GENERAL APPEARANCE: NAD  EYES: No scleral icterus, pupils equal  HENT: mouth without ulcers or lesions  PULM: lungs clear to auscultation, bilaterally, equal air movement, no clubbing  CV: regular rhythm, normal rate, no rub     -JVD no     -edema bilateral   GI: soft, diffuse tender, distended  INTEGUMENT: no cyanosis, no rash  NEURO:no asterixis     Labs:   All labs reviewed by me  Electrolytes/Renal -   Recent Labs   Lab Test  01/08/18   0815  01/07/18   1655  11/21/17   1239  11/15/17   0758  11/14/17   0645   08/02/17   1311   NA  138  137  142  144  141   < >  139   POTASSIUM  3.3*  3.7  4.6  3.2*  3.7   < >  4.1   CHLORIDE  99  97  102  103  103   < >  102   CO2  29  28  29  30  27   < >  22   BUN  42*  42*  66*  69*  76*   < >  137*   CR  6.91*  7.10*  7.65*  6.98*  6.85*   < >  6.74*   GLC  194*  171*  189*  294*  312*   < >  139*   TRINI  7.6*  8.2*  8.9  8.9  8.9   < >  8.8   MAG   --    --   1.9  1.9  1.8   --    --    PHOS   --    --    --   5.0*  4.3   --   4.8*    < > = values in this interval not displayed.     CBC -   Recent Labs   Lab Test  01/08/18   0815  01/07/18   1655  11/14/17   0645   WBC  4.4  6.3  10.1   HGB  9.0*  10.0*  9.1*   PLT  256  277  234       LFTs -   Recent Labs   Lab Test  01/07/18   1655  11/13/17   1709  08/02/17   1311  07/05/17   1204   ALKPHOS  76  104   --   98   BILITOTAL  0.4  0.8   --   0.5   ALT  24  26   --   15   AST  32  22   --   13   PROTTOTAL  6.0*  6.0*   --   6.2*   ALBUMIN  1.8*  2.3*  3.2*  2.7*       Iron Panel -   Recent Labs   Lab Test  07/19/17   1306  07/05/17   1204  06/21/17   1058   IRON  46  26*  69   IRONSAT  18  12*  29   ALBERTINA  369  542*  557*     Current Medications:    zz extemporaneous template  400 mg Oral Daily     amLODIPine  5 mg Oral Daily     aspirin  81 mg Oral Daily     atorvastatin  40 mg Oral Daily      fluticasone  1-2 spray Both Nostrils Daily     calcitRIOL  0.25 mcg Oral Daily     calcium acetate  667 mg Oral TID w/meals     bumetanide  6 mg Oral BID     omeprazole  20 mg Oral Daily     losartan  100 mg Oral Daily     metoprolol  100 mg Oral Daily     insulin aspart  1-7 Units Subcutaneous TID AC     insulin aspart  1-5 Units Subcutaneous At Bedtime     [START ON 1/11/2018] cefTAZidime  2 g Intravenous Q72H     vancomycin place bui - receiving intermittent dosing  1 each Does not apply See Admin Instructions       NaCl Stopped (01/07/18 0976)     peritoneal dialysis solutions ADULT       Betty Coffey MD

## 2018-01-08 NOTE — PHARMACY-VANCOMYCIN DOSING SERVICE
Pharmacy Vancomycin Initial Note  Date of Service 2018  Patient's  1955  63 year old, male    Indication: Peritonitis    Current estimated CrCl = ESRD on peritoneal dialysis    Creatinine for last 3 days  2018:  4:55 PM Creatinine 7.10 mg/dL    Recent Vancomycin Level(s) for last 3 days  No results found for requested labs within last 72 hours.      Vancomycin IV Administrations (past 72 hours)      No vancomycin orders with administrations in past 72 hours.                Nephrotoxins and other renal medications (Future)    Start     Dose/Rate Route Frequency Ordered Stop    18  vancomycin (VANCOCIN) 2,000 mg in NaCl 0.9 % 500 mL intermittent infusion      2,000 mg  over 2 Hours Intravenous ONCE 18  vancomycin place bui - receiving intermittent dosing      1 each Does not apply SEE ADMIN INSTRUCTIONS 18            Contrast Orders - past 72 hours     None                Plan:  1.  Start vancomycin  2,000mg IV x 1 dose   2.  Goal Trough Level: 15-20 mg/L   3.  Pharmacy will check trough levels as appropriate in 1-3 Days.    4. Morris method utilized to dose vancomycin therapy: Method 2    Niyah Manzanares, PharmD Resident      .

## 2018-01-08 NOTE — PLAN OF CARE
Problem: Patient Care Overview  Goal: Plan of Care/Patient Progress Review  Outcome: No Change  VSS ex hypotension and tachycardia; pt denies c/o dizziness. Pt denies c/o pain. Pt tolerating renal/diabetic diet; denies N/V. Pt set up peritoneal dialysis to run overnight. PIV SL and pt refused any further MIVF once IV Vanco completed. Up ad edith. UOP adequate via urinal. PLAN: continue POC and IV antibiotics.     Pt does not want bedside report if asleep.

## 2018-01-08 NOTE — PROGRESS NOTES
[facility], [place]    Internal Medicine Progress Note - [facility] Service    Main Plans for Today   Continue abx course  CT abd/pelvis     Assessment & Plan   Murray Nicholson is a 63 year old male with history of T2DM, MGUS, HLD, HTN, ESRD (likely 2/2 DM,HTN) on PD, HFrEF (EF - 20-25% with severe global hypokinesis), anemia of chronic disease and gout on allopurinol and prednisone who is admitted for further treatment of bacterial peritonitis that has failed outpatient treatment. Patient hemodynamically stable.      #Bacterial peritonitis s/p treatment failure (9 day intraperitoneal course of Ceftazidime).  #ESRD 2/2 T2Dm, HTN - on PD  Patient with abdominal pain, cloudy dialysate and s/p 9 day course of intraperitoneal ceftazidime for suspected peritonitis. Cultures were done at [facility]. Per Nephrology note, peritoneal fluid notable for E.Coli,  Pseudomonas and Streptococcus. Patient presented to ED due to worsening abdominal symptoms. Fluid studies shows 4733 WBCs on peritoneal dialysate with 80% neutrophils on 1/7/2018. Lactate WNL. No fevers or leukocytosis. Symptoms and vitals are stable.   - IV Vancomycin for streptococcus coverage  - IV Ceftazidime for pseudomonal/E.Coli coverage  - Monitor vital signs closely  - PD fluid gram stain with many WBCs and culture NGTD  - BC NGTD  - Nephrology consulted, appreciate recs  - Per nephrology, can continue PD for now.   - Continue PTA calcitriol, and phoslo  - Will obtain CT ab/pelvis to assess further      #T2DM  Last A1C - 6.4 in 6/2017. On Glipizide PTA. No acute issues.   - Hold Glipizide due to hypoglycemia risk. Will resume at discharge  - MD SSI  - Glucose checks  - Hypoglycemia protocol     #HFrEF (EF 20-25% on 4/2017 TTE) - Stage C, NYHA class III  #CAD  #HTN  #HLD  #Ascending aortic aneurysm  No cardiorespiratory symptoms on presentation. On Losartan, Toprol and ASA. Last Echo 4/2017 with  coronary angiogram yet to be done, although  ordered. Ascending aortic aneurysm 4.8cm as at last check 8/2/2017.      - Continue PTA ASA 81mg daily  - Continue PTA Losartan 100mg daily  - Continue  PTA Toprol XL 100mg daily  - Continue PTA Bumex 6mg BID  - Continue PTA Atorvastatin 40mg daily  - Continue PTA Amlodipine 5 mg daily  - CORE clinic referral on d/c  - Hold PTA Hydralazine, Spironolactone, Torsemide, Imdur (will review with pharmacy)     #Cough.   Patient reporting mild non-productive cough. CXR with no acute findings  - Tessalon Perles  - Continue to monitor.     CHRONIC PROBLEMS  #Gout. Stable. Continue Allopurinol, will hold Prednisone given ongoing infection.  #MGUS. Stable.  #Anemia of chronic disease: Stable    #GERD. Stable. On Omeprazole         Medications held:  Hydralazine  Glipizide  Imdur  Prednisone  Spironolactone  Torsemide    Diet: Combination Diet Renal Diet; 2017-7132 Calories: Moderate Consistent CHO (4-6 CHO units/meal)  Fluids: none  DVT Prophylaxis: Pneumatic Compression Devices  Code Status: Full Code    Disposition Plan   Expected discharge: 2 - 3 days, recommended to prior living arrangement once adequate pain management/ tolerating PO medications and antibiotic plan established.     Entered: Mateo Singh 01/08/2018, 3:01 PM   Information in the above section will display in the discharge planner report.      The patient's care was discussed with the Attending Physician, Dr. Roper and Bedside Nurse.    Mateo Singh MD, MPH  Medicine-Pediatrics PGY4  [phone]      Please see sticky note for cross cover information    Interval History   Patient states that he felt a bit improved when he first arrived but abdominal pain slowly coming back. Pain is currently 5/10. Previously 8/10. No nausea, vomiting, difficulty breathing, or fevers. He tried dilaudid for pain which made him light headed and didn't help the pain.     Physical Exam   Vital Signs: Temp: 99.3  F (37.4  C) Temp src: Oral BP: 127/69 Pulse: 105   Resp: 18 SpO2: 98 %  O2 Device: None (Room air)    Weight: 197 lbs 11.2 oz  General Appearance: Well appearing, no distress  Respiratory: CTAB, no wheezing or crackles  Cardiovascular: RRR, no m/r/g  GI: soft, tender to palpation diffusely, some guarding, no rebound  Skin: no rashes or concerning lesions, no jaundice  Neuro: AOx4         Data   Medications     peritoneal dialysis solutions ADULT         zz extemporaneous template  400 mg Oral Daily     amLODIPine  5 mg Oral Daily     aspirin  81 mg Oral Daily     atorvastatin  40 mg Oral Daily     fluticasone  1-2 spray Both Nostrils Daily     calcitRIOL  0.25 mcg Oral Daily     calcium acetate  667 mg Oral TID w/meals     bumetanide  6 mg Oral BID     omeprazole  20 mg Oral Daily     losartan  100 mg Oral Daily     metoprolol  100 mg Oral Daily     insulin aspart  1-7 Units Subcutaneous TID AC     insulin aspart  1-5 Units Subcutaneous At Bedtime     [START ON 1/11/2018] cefTAZidime  2 g Intravenous Q72H     vancomycin place bui - receiving intermittent dosing  1 each Does not apply See Admin Instructions     Data     Recent Labs  Lab 01/08/18  0815 01/07/18  1655   WBC 4.4 6.3   HGB 9.0* 10.0*   MCV 99 99    277    137   POTASSIUM 3.3* 3.7   CHLORIDE 99 97   CO2 29 28   BUN 42* 42*   CR 6.91* 7.10*   ANIONGAP 10 12   TRINI 7.6* 8.2*   * 171*   ALBUMIN  --  1.8*   PROTTOTAL  --  6.0*   BILITOTAL  --  0.4   ALKPHOS  --  76   ALT  --  24   AST  --  32

## 2018-01-08 NOTE — PROGRESS NOTES
Pt presented to ED with complaint of abdominal pain.  Currently being treated for peritonitis with [facility].  Fluid ordered and infused for dwell to collect labs.  Ali following and communicating with ED team.

## 2018-01-08 NOTE — PROGRESS NOTES
PERITONEAL DIALYSIS CYCLER TREATMENT NOTE      Date:  1/8/2018  Time:  1:33 PM    Data:   Treatment Ultrafiltrate Volume mL:  Last night's UF 29mL, TTV 8000mL   Total Duration of Therapy: 8hrs   Fill Volume: 2000 mL  Heater Bag: Volume 6L and Dextrose Concentration 2.5%, Ca 2.5  Side Bag(s): Volume 6L  and Dextrose Concentration 2.5%, Ca 2.5  Total Number of Cycles: 4       Assessment:   PD Patient Response:  Tolerating well. Dressing is CDI, catheter secured.     Interventions:   Cycler set with above prescription for this evening's therapy.  Pt will self connect this evening; all needed supplies available.   Hand off given to RN.      Plan:     Next tx per renal team.
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
## 2018-01-09 LAB
ANION GAP SERPL CALCULATED.3IONS-SCNC: 10 MMOL/L (ref 3–14)
APPEARANCE FLD: CLEAR
BASOPHILS NFR FLD MANUAL: 2 %
BUN SERPL-MCNC: 41 MG/DL (ref 7–30)
CALCIUM SERPL-MCNC: 7.8 MG/DL (ref 8.5–10.1)
CHLORIDE SERPL-SCNC: 97 MMOL/L (ref 94–109)
CO2 SERPL-SCNC: 28 MMOL/L (ref 20–32)
COLOR FLD: COLORLESS
CREAT SERPL-MCNC: 7.38 MG/DL (ref 0.66–1.25)
ERYTHROCYTE [DISTWIDTH] IN BLOOD BY AUTOMATED COUNT: 17.4 % (ref 10–15)
GFR SERPL CREATININE-BSD FRML MDRD: 8 ML/MIN/1.7M2
GLUCOSE BLDC GLUCOMTR-MCNC: 127 MG/DL (ref 70–99)
GLUCOSE BLDC GLUCOMTR-MCNC: 135 MG/DL (ref 70–99)
GLUCOSE BLDC GLUCOMTR-MCNC: 153 MG/DL (ref 70–99)
GLUCOSE BLDC GLUCOMTR-MCNC: 310 MG/DL (ref 70–99)
GLUCOSE SERPL-MCNC: 177 MG/DL (ref 70–99)
GRAM STN SPEC: NORMAL
HCT VFR BLD AUTO: 29.9 % (ref 40–53)
HGB BLD-MCNC: 9.2 G/DL (ref 13.3–17.7)
LACTATE BLD-SCNC: 1.8 MMOL/L (ref 0.7–2)
LYMPHOCYTES NFR FLD MANUAL: 4 %
Lab: NORMAL
MCH RBC QN AUTO: 30.6 PG (ref 26.5–33)
MCHC RBC AUTO-ENTMCNC: 30.8 G/DL (ref 31.5–36.5)
MCV RBC AUTO: 99 FL (ref 78–100)
NEUTS BAND NFR FLD MANUAL: 57 %
OTHER CELLS FLD MANUAL: 37 %
PLATELET # BLD AUTO: 269 10E9/L (ref 150–450)
POTASSIUM SERPL-SCNC: 3 MMOL/L (ref 3.4–5.3)
RBC # BLD AUTO: 3.01 10E12/L (ref 4.4–5.9)
RBC # FLD: NORMAL /UL
SODIUM SERPL-SCNC: 135 MMOL/L (ref 133–144)
SPECIMEN SOURCE FLD: NORMAL
SPECIMEN SOURCE: NORMAL
WBC # BLD AUTO: 3.1 10E9/L (ref 4–11)
WBC # FLD AUTO: 487 /UL

## 2018-01-09 PROCEDURE — 27210995 ZZH RX 272: Performed by: STUDENT IN AN ORGANIZED HEALTH CARE EDUCATION/TRAINING PROGRAM

## 2018-01-09 PROCEDURE — 25000132 ZZH RX MED GY IP 250 OP 250 PS 637: Performed by: INTERNAL MEDICINE

## 2018-01-09 PROCEDURE — 83605 ASSAY OF LACTIC ACID: CPT | Performed by: INTERNAL MEDICINE

## 2018-01-09 PROCEDURE — 25000132 ZZH RX MED GY IP 250 OP 250 PS 637: Performed by: HOSPITALIST

## 2018-01-09 PROCEDURE — 80048 BASIC METABOLIC PNL TOTAL CA: CPT | Performed by: INTERNAL MEDICINE

## 2018-01-09 PROCEDURE — 36415 COLL VENOUS BLD VENIPUNCTURE: CPT | Performed by: INTERNAL MEDICINE

## 2018-01-09 PROCEDURE — 27210995 ZZH RX 272: Performed by: INTERNAL MEDICINE

## 2018-01-09 PROCEDURE — 40000095 ZZH STATISTIC LEVEL 2 EST PATIENT

## 2018-01-09 PROCEDURE — 25000128 H RX IP 250 OP 636: Performed by: STUDENT IN AN ORGANIZED HEALTH CARE EDUCATION/TRAINING PROGRAM

## 2018-01-09 PROCEDURE — 00000146 ZZHCL STATISTIC GLUCOSE BY METER IP

## 2018-01-09 PROCEDURE — 12000008 ZZH R&B INTERMEDIATE UMMC

## 2018-01-09 PROCEDURE — 99233 SBSQ HOSP IP/OBS HIGH 50: CPT | Mod: GC | Performed by: INTERNAL MEDICINE

## 2018-01-09 PROCEDURE — 87205 SMEAR GRAM STAIN: CPT | Performed by: HOSPITALIST

## 2018-01-09 PROCEDURE — 89051 BODY FLUID CELL COUNT: CPT | Performed by: INTERNAL MEDICINE

## 2018-01-09 PROCEDURE — 87070 CULTURE OTHR SPECIMN AEROBIC: CPT | Performed by: HOSPITALIST

## 2018-01-09 PROCEDURE — 25000132 ZZH RX MED GY IP 250 OP 250 PS 637: Performed by: STUDENT IN AN ORGANIZED HEALTH CARE EDUCATION/TRAINING PROGRAM

## 2018-01-09 PROCEDURE — 85027 COMPLETE CBC AUTOMATED: CPT | Performed by: INTERNAL MEDICINE

## 2018-01-09 RX ORDER — CEFTAZIDIME 1 G/1
1 INJECTION, POWDER, FOR SOLUTION INTRAMUSCULAR; INTRAVENOUS EVERY 24 HOURS
Status: DISCONTINUED | OUTPATIENT
Start: 2018-01-09 | End: 2018-01-11

## 2018-01-09 RX ORDER — POLYETHYLENE GLYCOL 3350 17 G/17G
17 POWDER, FOR SOLUTION ORAL DAILY PRN
Status: DISCONTINUED | OUTPATIENT
Start: 2018-01-09 | End: 2018-01-20 | Stop reason: HOSPADM

## 2018-01-09 RX ORDER — GENTAMICIN SULFATE 1 MG/G
CREAM TOPICAL DAILY PRN
COMMUNITY
End: 2018-02-02

## 2018-01-09 RX ADMIN — CALCIUM ACETATE 667 MG: 667 CAPSULE ORAL at 14:23

## 2018-01-09 RX ADMIN — OMEPRAZOLE 20 MG: 20 CAPSULE, DELAYED RELEASE ORAL at 08:46

## 2018-01-09 RX ADMIN — DOCUSATE SODIUM 100 MG: 100 CAPSULE, LIQUID FILLED ORAL at 14:39

## 2018-01-09 RX ADMIN — LOSARTAN POTASSIUM 100 MG: 50 TABLET ORAL at 08:45

## 2018-01-09 RX ADMIN — CALCIUM ACETATE 667 MG: 667 CAPSULE ORAL at 08:44

## 2018-01-09 RX ADMIN — INSULIN ASPART 1 UNITS: 100 INJECTION, SOLUTION INTRAVENOUS; SUBCUTANEOUS at 10:00

## 2018-01-09 RX ADMIN — ATORVASTATIN CALCIUM 40 MG: 40 TABLET, FILM COATED ORAL at 21:26

## 2018-01-09 RX ADMIN — FLUTICASONE PROPIONATE 2 SPRAY: 50 SPRAY, METERED NASAL at 08:46

## 2018-01-09 RX ADMIN — BUMETANIDE 6 MG: 2 TABLET ORAL at 08:44

## 2018-01-09 RX ADMIN — ASPIRIN 81 MG CHEWABLE TABLET 81 MG: 81 TABLET CHEWABLE at 08:45

## 2018-01-09 RX ADMIN — SODIUM CHLORIDE, SODIUM LACTATE, CALCIUM CHLORIDE, MAGNESIUM CHLORIDE AND DEXTROSE: 4.25; 538; 448; 18.3; 5.08 INJECTION, SOLUTION INTRAPERITONEAL at 17:33

## 2018-01-09 RX ADMIN — BUMETANIDE 6 MG: 2 TABLET ORAL at 17:01

## 2018-01-09 RX ADMIN — ALLOPURINOL 400 MG: 300 TABLET ORAL at 10:02

## 2018-01-09 RX ADMIN — METOPROLOL SUCCINATE 100 MG: 100 TABLET, EXTENDED RELEASE ORAL at 08:50

## 2018-01-09 RX ADMIN — CALCITRIOL 0.25 MCG: 0.25 CAPSULE, LIQUID FILLED ORAL at 08:45

## 2018-01-09 RX ADMIN — ACETAMINOPHEN 650 MG: 325 TABLET ORAL at 20:17

## 2018-01-09 RX ADMIN — DOCUSATE SODIUM 100 MG: 100 CAPSULE, LIQUID FILLED ORAL at 08:43

## 2018-01-09 RX ADMIN — TRAMADOL HYDROCHLORIDE 50 MG: 50 TABLET, COATED ORAL at 20:17

## 2018-01-09 RX ADMIN — POLYETHYLENE GLYCOL 3350 17 G: 17 POWDER, FOR SOLUTION ORAL at 14:25

## 2018-01-09 RX ADMIN — AMLODIPINE BESYLATE 5 MG: 5 TABLET ORAL at 08:46

## 2018-01-09 RX ADMIN — CEFTAZIDIME 1 G: 1 INJECTION, POWDER, FOR SOLUTION INTRAMUSCULAR; INTRAVENOUS at 14:41

## 2018-01-09 ASSESSMENT — PAIN DESCRIPTION - DESCRIPTORS
DESCRIPTORS: ACHING
DESCRIPTORS: SORE

## 2018-01-09 NOTE — PHARMACY-ADMISSION MEDICATION HISTORY
Admission medication history interview status for the 1/7/2018 admission is complete. See Epic admission navigator for allergy information, pharmacy, prior to admission medications and immunization status.     Medication history interview sources:  SureScripts and Patient    Changes made to PTA medication list (reason)  Added: gentamicin cream, b-complex vitamin, pepto-bismol, APAP  Deleted: bumetanide (discontinued by kyle, started torsemide), glipizide (patient reported not taking)  Changed: spironolactone 50mg -> spironolactone 100mg once daily, Flonase 1-2 sprays -> 2 sprays each nostril once daily    Additional medication history information (including reliability of information, actions taken by pharmacist): patient allergies updated, received flu shot 11/7/17    Uses higher strength of prednisone for gout flares PRN, but takes 5mg once daily scheduled.   Patient felt as though he did not need to take glipizide anymore so he stopped it.  Patient is unsure whether or not to take calcium acetate twice or three times daily.      Prior to Admission medications    Medication Sig Last Dose Taking? Auth Provider   gentamicin (GARAMYCIN) 0.1 % cream Apply topically daily as needed 1/9/2018 at AM Yes Unknown, Entered By History   B Complex-Biotin-FA (B-COMPLEX PO) Take 1 tablet by mouth daily 1/9/2018 at AM Yes Unknown, Entered By History   ACETAMINOPHEN PO Take 500-1,000 mg by mouth nightly as needed for pain PRN Yes Unknown, Entered By History   bismuth subsalicylate (PEPTO BISMOL) 262 MG/15ML suspension Take 15 mLs by mouth every 6 hours as needed for indigestion 12/26/2017 Yes Unknown, Entered By History   torsemide (DEMADEX) 100 MG tablet Take 1 tablet (100 mg) by mouth 2 times daily  Yes Ana Luisa Mcpherson MD   calcium acetate, Phos Binder, 667 MG TABS Take 1 tablet by mouth 3 times daily (with meals) 1/9/2018 at AM Yes Ana Luisa Mcpherson MD   predniSONE (DELTASONE) 5 MG tablet Take 1 tablet (5 mg) by mouth  daily 1/9/2018 at AM Yes Darvin Tang MD   predniSONE (DELTASONE) 20 MG tablet Take 2 tablets (40 mg) by mouth daily for 3 days for gout flares 12/26/2017 Yes Darvin Tang MD   amLODIPine (NORVASC) 5 MG tablet Take 1 tablet (5 mg) by mouth daily 1/9/2018 at AM Yes Yahir Turcios MD   metoprolol (TOPROL XL) 100 MG 24 hr tablet Take 1 tablet (100 mg) by mouth daily 1/9/2018 at AM Yes Yahir Turcios MD   allopurinol (ZYLOPRIM) 100 MG tablet Take 1 tablet (100 mg) by mouth daily Take with 300 mg tabs for 400 mg /day total. 1/9/2018 at AM Yes Darvin Tang MD   allopurinol (ZYLOPRIM) 300 MG tablet Take 1 tablet (300 mg) by mouth daily Take with 100 mg tabs for 400 mg /day total. 1/9/2018 at AM Yes Darvin Tang MD   losartan (COZAAR) 100 MG tablet TAKE 1 TABLET BY MOUTH DAILY 1/9/2018 at AM Yes Yahir Turcios MD   albuterol (PROAIR HFA/PROVENTIL HFA/VENTOLIN HFA) 108 (90 BASE) MCG/ACT Inhaler Inhale 2 puffs into the lungs every 6 hours as needed for shortness of breath / dyspnea or wheezing PRN Yes Yahir Turcios MD   fluticasone (FLONASE) 50 MCG/ACT spray Spray 1-2 sprays into both nostrils daily  Patient taking differently: Spray 2 sprays into both nostrils daily  1/9/2018 at AM Yes Yahir Turcios MD   spironolactone (ALDACTONE) 100 MG tablet Take 0.5 tablets (50 mg) by mouth daily  Patient taking differently: Take 100 mg by mouth daily  1/9/2018 at AM Yes Ana Luisa Mcpherson MD   calcitRIOL (ROCALTROL) 0.25 MCG capsule Take 1 capsule (0.25 mcg) by mouth daily 1/9/2018 at AM Yes Brice Caraballo MD   atorvastatin (LIPITOR) 40 MG tablet Take 1 tablet (40 mg) by mouth daily 1/9/2018 at AM Yes Leia De Leon APRN CNP   isosorbide mononitrate (IMDUR) 60 MG 24 hr tablet Take 1 tablet (60 mg) by mouth daily 1/9/2018 at AM Yes Leia De Leon APRN CNP   hydrALAZINE (APRESOLINE) 50 MG tablet Take 1 tablet (50 mg) by mouth 3 times daily 1/9/2018 at AM Yes Leia De Leon  APRN CNP   omeprazole (PRILOSEC) 20 MG CR capsule Take 20 mg by mouth daily At night 1/9/2018 at AM Yes Unknown, Entered By History   loratadine (CLARITIN) 10 MG tablet Take 10 mg by mouth daily as needed Reported on 5/3/2017 1/6/2018 Yes Reported, Patient   ASPIRIN 81 MG OR TABS Take 1 tablet (81 mg) by mouth at bedtime 1/9/2018 at AM Yes Reported, Patient         Medication history completed by: Mack Osborne, PharmD Candidate

## 2018-01-09 NOTE — PROGRESS NOTES
[facility], [place]   Antimicrobial Management Team (AMT) Note              To: Lucas Sue  Unit: 7C  Allergies   Allergen Reactions     Norco [Hydrocodone-Acetaminophen] Nausea and Vomiting     Cats      Throat tightness     Penicillins Hives     Seasonal Allergies      rhinitis     Shrimp      Throat closes        Brief Summary: Murray Nicholson is a 63 y.o male with PMH of MGUS, ESRD on PD, HFrEF, HTN, AAA, GERD, SHEELA, DMT2, and gout admitted on 1/7/18 with peritonitis.   HPI: Patient presented with abdominal pain that was described as dull and constant and a cloudy dialysate. At his PD center, he was diagnosed with peritonitis and started on intraperitoneal ceftazidime. The pain improved and dialysate became less cloudy for a brief period but then returned. Patient completed 9 days of IP ceftazidime at admission. He was continued on IV ceftazidime and started on vancomycin.      Assessment:   #Peritonitis   No leukocytosis present but patient has been febrile and tachycardic. CT of the abdomen pelvis showed moderate intraperitoneal free air collection in the upper abdomen which could be secondary to intraperitoneal administration of antibiotics or a gas-forming organisms. Peritoneal fluid samples from 1/7show elevated WBC at 4733 but the gram stain did not identify any organisms growing and culture remains NGTD. Two sets of blood cultures were also obtained and have NGTD. Patient s abdominal pain began improving at admission without antibiotics and denies abdominal pain since being admitted. Patient has a history of pseudomonal and E.coli infected pancreatitis, per provider notes although unable to locate cultures to confirm, therefore appropriate to have an agent that targets these organisms. Patient has no known history of MRSA. Given no history of MRSA and ceftazidimes spectrum of coverage includes MSSA, appropriate to discontinue vancomycin. Agree with team to continue ceftazidime.      Recommendation/Interventions:  1). Discontinue vancomycin.  2). Continue ceftazidime.     Discussed with ID Staff - Dr. Rahul Blank MD and Dr. Jessica Ronquillo, PharmD  Estefanía Ferrer, PharmD    Current Anti-infective Orders:  Anti-infectives (Future)    Start     Dose/Rate Route Frequency Ordered Stop    01/11/18 0700  cefTAZidime (FORTAZ) 2 g in 10 mL SWFI for IVP      2 g  over 3-5 Minutes Intravenous EVERY 72 HOURS 01/08/18 1004      01/07/18 2035  vancomycin place bui - receiving intermittent dosing      1 each Does not apply SEE ADMIN INSTRUCTIONS 01/07/18 2036            Vitals and other clinical features  Vitals       01/07 0700  -  01/08 0659 01/08 0700  -  01/09 0659 01/09 0700  -  01/09 0924   Most Recent    Temp ( F) 98.2 -  99    98.3 -  101.6       99.6 (37.6)    Pulse 101 -  105    95 -  107       102    Resp   16    18 -  20       18    /53 -  113/62    113/63 -  134/66       130/74    SpO2 (%) 94 -  99    96 -  99       97        Temperature curve:      Labs  CrCl cannot be calculated (Unknown ideal weight.).  Recent Labs   Lab Test  01/09/18   0802  01/08/18   0815  01/07/18   1655  11/21/17   1239  11/15/17   0758  11/14/17   0645   CR  7.38*  6.91*  7.10*  7.65*  6.98*  6.85*       Recent Labs   Lab Test  01/09/18   0802  01/08/18   0815  01/07/18   1655  11/14/17   0645  11/13/17   1709  08/02/17   1311   07/05/17   1204  06/26/17   0140   05/31/17   1111   WBC  3.1*  4.4  6.3  10.1  9.6  7.1   --   11.9*  8.3   < >  8.0   ANEU   --   3.3  5.3   --   8.4*   --    --   10.9*  7.6   --   6.5   ALYM   --   0.6*  0.5*   --   0.5*   --    --   0.3*  0.4*   --   0.8   DK   --   0.2  0.3   --   0.5   --    --   0.6  0.2   --   0.6   AEOS   --   0.2  0.1   --   0.1   --    --   0.0  0.1   --   0.1   HGB  9.2*  9.0*  10.0*  9.1*  8.9*  10.4*   < >  8.9*  9.4*   < >  9.9*   HCT  29.9*  28.9*  31.4*  29.8*  28.9*  31.6*   < >  27.4*  30.2*   < >  31.1*   MCV  99  99  99  104*  101*  92   --    91  94   < >  92   PLT  269  256  277  234  277  221   --   267  203   < >  302    < > = values in this interval not displayed.       Recent Labs   Lab Test  01/07/18   1655  11/13/17   1709  08/02/17   1311  07/05/17   1204  06/21/17   1058  05/31/17   1111  05/17/17   1214  04/28/17   0842   BILITOTAL  0.4  0.8   --   0.5   --   0.5  0.6  0.5   ALKPHOS  76  104   --   98   --   97  86  129   ALBUMIN  1.8*  2.3*  3.2*  2.7*  2.9*  2.7*  2.6*  2.7*   AST  32  22   --   13   --   18  16  11   ALT  24  26   --   15   --   16  12  13       Recent Labs   Lab Test  01/09/18   0026  01/07/18   1655   07/05/17   1204   06/26/17   0636  05/31/17   1111  05/17/17   1214  04/13/17   0651  04/12/17   0935  04/11/17   1319  01/13/16   1337   LACT  1.8  1.1   < >   --    < >   --    --    --    --    --    --    --    CRP   --    --    --   35.4*   --    --   15.9*  39.3*  270.0*  200.0*  130.0*   --    SED   --    --    --    --    --    --    --    --    --    --    --   80*   PCAL   --    --    --    --    --   0.14   --    --    --   0.88   --    --     < > = values in this interval not displayed.     Culture results with specimen source  Culture Micro   Date Value Ref Range Status   01/07/2018 Culture negative monitoring continues  Preliminary   01/07/2018 No growth after 2 days  Preliminary   01/07/2018 No growth after 2 days  Preliminary   06/26/2017 No growth  Final   06/26/2017 No growth  Final   04/12/2017 No growth  Final    Specimen Description   Date Value Ref Range Status   01/07/2018 Peritoneal fluid  Final   01/07/2018 Peritoneal fluid  Final   01/07/2018 Blood Left Hand  Final   01/07/2018 Blood Left Arm  Final   06/26/2017 Blood Left Arm VAD Collection  Final   06/26/2017 Blood Right Arm VAD Collection  Final        Urine Studies     Recent Labs   Lab Test  05/03/17   1344  04/08/17   1720  08/10/15   0758  05/05/15   1110  07/03/13   1410   URINEPH  5.0  5.0  5.5  5.5  5.5   NITRITE  Negative  Negative   Negative  Negative  Negative   LEUKEST  Negative  Negative  Negative  Negative  Trace*   WBCU  1  7*  1  2  2     Imaging:  Xr Chest 2 Views    Result Date: 1/7/2018  Exam:  Chest radiograph, 1/7/2018 9:38 PM History: PA and lateral view of the chest. Comparison:  11/13/2017 Findings:  PA and lateral view of the chest. The cardiomediastinal silhouette is stably enlarged. Pulmonary vasculature is distinct. No pleural effusion or pneumothorax thorax. No acute airspace opacity. Free air in the right upper quadrant underneath the right hemidiaphragm. No portal venous gas. No pneumatosis. No dilated loops of bowel.     Impression:  1. Stable enlargement of the cardiomediastinal silhouette. No acute airspace opacity. 2. Pneumoperitoneum, potentially secondary to peritoneal dialysis. I have personally reviewed the examination and initial interpretation and I agree with the findings. ROSELYN TAVAREZ MD    Ct Abdomen Pelvis W/o Contrast    Result Date: 1/8/2018  EXAMINATION: CT ABDOMEN PELVIS W/O CONTRAST, 1/8/2018 4:49 PM TECHNIQUE:  Helical CT images from the lung bases through the pubic symphysis were obtained without IV contrast. COMPARISON: 11/13/2017 abdominal radiographs; 5/19/2015 renal ultrasound 11/22/2006 abdominal MRI. 1/7/2018 chest radiograph. HISTORY: Patient with bacterial peritonitis, assess for abscess or other abnormalities, patient with significant abdominal pain; FINDINGS: Abdomen and pelvis: Moderate intraperitoneal free air collecting in the upper abdomen. Peritoneal dialysis catheter enters just left of the umbilicus and coils in the right lower quadrant with a small amount of fluid surrounding the coiled tip. No focal intraperitoneal collection Normal caliber small and large bowel. Numerous distal colonic diverticula. Small hiatal hernia. The appendix is mildly dilated measuring up to 10 mm without significant periappendiceal fat stranding. No pneumatosis or portal venous gas. The liver, gallbladder,  pancreas, spleen, and adrenal glands are unremarkable. Mild bilateral renal atrophy. Simple cortical cyst arises from the anterior midpole of the right kidney. No hydronephrosis, hydroureter, or urinary tract stone. The bladder is within normal limits. Dystrophic calcifications in the enlarged prostate. Symmetric seminal vesicles. No abdominal or pelvic lymphadenopathy. Marked atherosclerotic calcification of the abdominal aorta and iliac arteries without aneurysmal dilation. Small free fluid in the pelvis. Lung bases:  Bibasilar subpleural atelectasis versus scarring and mild groundglass opacities. Mucous plugging in the medial left lower lobe. Cardiomegaly. No pericardial effusion. Bones and soft tissues: Moderate osseous degenerative change. No acute or aggressive osseous lesion.     IMPRESSION: 1. Moderate intraperitoneal free air collecting in the upper abdomen, likely be secondary to intraperitoneal administration of antibiotics in this patient with a peritoneal dialysis catheter, though, given this patient's history of peritonitis, a gas-forming organism may be contributing. Perforated viscous could have a similar appearance in the appropriate context.  Small amount of fluid adjacent to the peritoneal dialysis catheter tip in the pelvis, within expected limits. 2. Mildly dilated appendix without significant periappendiceal fat stranding. Recommend clinical correlation for acute appendicitis. 3. Colonic diverticulosis without evidence of diverticulitis. 4. Mild bibasilar groundglass opacities may represent atelectasis given associated peripheral/subpleural linear atelectasis versus scarring, though infection remains a possibility. 5. Marked cardiomegaly. [Urgent Result: Intraperitoneal free air ].  Appendix finding also reviewed over the phone by Dr. Hill. Finding was identified on 1/8/2018 6:22 PM. Dr. Farrell was contacted by Dr. Hill at 1/8/2018 7:40 PM and verbalized understanding of the urgent  finding.  I have personally reviewed the examination and initial interpretation and I agree with the findings. KIRA BERKOWITZ MD

## 2018-01-09 NOTE — CONSULTS
[facility] SERVICE CONSULTATION     Patient:  Murray Nicholson   Date of birth 1955, Medical record number [id number]  Date of Visit:  01/09/2018  Date of Admission: 1/7/2018  Consult Requester:Breanne Miller MD          Assessment and Recommendations:   ASSESSMENT:  #. Peritoneal Dialysis Related Bacterial Peritonitis  Patient had peritoneal fluid cultured on 1/2 that appears to be positive for E. Coli (2 strains) that are pansensitive, Pseudomonas aeruginosa and Streptococcus (not otherwise speciated). Patient has been treating himself with intraperitoneal Ceftazidime since 12/29, and this should have covered the organisms that he is growing fairly well. May have not covered the Strep spp. Appears to be improving with addition of Vancomycin, although seems odd that Vancomycin would be necessary coverage, unless in face the Strep spp is actually an Enterococcus. Interestingly, patient's aerobic culture upon admit is negative and gram stain is without organisms, just many WBC that predominantly PMN. In any case, CT imaging is largely unremarkable and agree with surgical consultation that surgical intervention for appendicitis or other secondary source is unnecessary. Per several studies, would expect patient's PMN count to be much lower if antibiotic treatment is appropriate and there is no presence of perforated viscus or intra-abd abscess. Would recommend re-testing ascitic fluid to confirm this. Also agree with sputum culture and gram stain given patient's recent productive cough, but do not favor pneumonia as a diagnosis at this time.     RECOMMENDATION:  -- repeat ascitic fluid analysis with cell count/diff (if PMN are less that prior at 48 hours, fits with effective treatment of peritonitis).   -- continue Ceftazidime, but decrease dose to 500 mg q day (confirm with Pharmacy, but this should be the appropriate dose in a PD patient)  -- continue Vancomycin renally dosed per pharmacy  -- please obtain repeat  "updated speciation and susceptibilities on culture results from [facility] sample 1/02 as this will help guide our antibiotic treatment plan.    -- f/u sputum culture and gram stain    Patient seen and discussed with my attending, Dr. Lawrence, who agrees with my assessment and plan.     Thank you for this interesting consult. We will continue to follow this patient at this time. Please call with questions.     Ryley Marcus MD  PGY-3 Internal Medicine/Merged with [facility]  P: [phone]  ________________________________________________________________    Consult Question: Abx treatment and duration for PD related peritonitis.   Admission Diagnosis: SBP (spontaneous bacterial peritonitis) (H) [K65.2]         History of Present Illness:   Murray Nicholson is a 63 year old male with history of DMII, MGUS, HLD, HTN, Bicuspid Aortic Valve, ESRD (likely 2/2 DM,HTN) on PD, HFrEF (EF - 20-25% with severe global hypokinesis), anemia of chronic disease, GERD and Gout on allopurinol and prednisone who is here on account of abdominal pain.    Patient was initiated on PD in August 2017 and has been doing well with it until recently. States that symptoms started on 12/24 when he started vomiting after eating his breakfast. Improved throughout the day and was able to tolerate PO intake later that day. Started to have abdominal pain and noticed \"cloudy\" appearance to dialysate the following day. The abdominal pain and cloudy dialysate was intermittent over the next few days, but developed worse pain on 12/29 that describes as dull and constant.     He presented at his HD center and was diagnosed with peritonitis. He was commenced on intraperitoneal Ceftazidime. He says the pain got better and the dialysate got less cloudy. However, this was short lived and pain returned, even worse than before and he noticed that his peritoneal fluid was cloudy once again a few days into the new year.     He reports that he had been constipated recently and " started taking stool softeners. On his way to work Saturday 1/6, he had one episode of fecal incontinence. ON Sunday 1/7 he had worsening abdominal pain 8-9/10 and had a temperature of about 100 at home. He decided to present in the ED for further evaluation, after discussing with his nurse.     When he arrived in the ED, he was afebrile and vitally stable. Of note, his dialysate cell count was 4733 with 80% neutrophils. He was therefore commenced on Vancomycin and Ceftazidime.     Patient was doing better, but on 1/8 was noted to severe abdominal pain that kept him moving at all as this only exacerbated the pain. Had a fever of 101.6 overnight 1/8 that resolved without intervention. At time of this interview, endorses improvement in abdominal pain (3-4/10) and subjectively feels better. He does endorse that for a few days leading up to admit, also had worsening cough that productive of green-yellow sputum that was apparently very thick and large amount daily. He also feels that his dialysate is starting to clear up, but thinks it is still somewhat cloudy.           Review of Systems:   11 point ROS was reviewed with patient and negative unless otherwise stated in HPI.          Past Medical History:     Past Medical History:   Diagnosis Date     (HFpEF) heart failure with preserved ejection fraction (H)      Allergic rhinitis, cause unspecified      Anemia of chronic kidney failure      Ascending aortic aneurysm (H)      Bicuspid aortic valve      CAD (coronary artery disease)      Chronic kidney disease, stage 5 (H)      Congestive heart failure, unspecified      Dyslipidemia      Esophageal reflux      Hypersomnia with sleep apnea, unspecified      Hypertension      MGUS (monoclonal gammopathy of unknown significance)      SHEELA (obstructive sleep apnea)      Systolic heart failure (H)      Type 2 diabetes mellitus (H)             Past Surgical History:     Past Surgical History:   Procedure Laterality Date      LAPAROSCOPIC HERNIORRHAPHY INGUINAL BILATERAL Bilateral 2015    Procedure: LAPAROSCOPIC HERNIORRHAPHY INGUINAL BILATERAL;  Surgeon: Bobby Mcconnell MD;  Location: [facility] OR     LAPAROSCOPIC INSERTION CATHETER PERITONEAL DIALYSIS N/A 2017    Procedure: LAPAROSCOPIC INSERTION CATHETER PERITONEAL DIALYSIS;  Laparoscopic Peritoneal Dialysis Catheter Placement - Anesthesia with block;  Surgeon: Esteban Arvizu MD;  Location: [facility] OR            Family History:     Family History   Problem Relation Age of Onset     C.A.D. Father       from-never knew father-age 60     DIABETES Father      CEREBROVASCULAR DISEASE Father      Hypertension No family hx of      Breast Cancer No family hx of      Cancer - colorectal No family hx of      Prostate Cancer No family hx of      KIDNEY DISEASE No family hx of             Social History:     Social History   Substance Use Topics     Smoking status: Former Smoker     Packs/day: 1.00     Years: 19.00     Types: Cigarettes     Quit date: 1994     Smokeless tobacco: Never Used     Alcohol use No     History   Sexual Activity     Sexual activity: Not Currently     Partners: Female     Birth control/ protection: Condom            Current Medications (antimicrobials listed in bold):       cefTAZidime  1 g Intravenous Q24H     zz extemporaneous template  400 mg Oral Daily     amLODIPine  5 mg Oral Daily     aspirin  81 mg Oral Daily     atorvastatin  40 mg Oral Daily     fluticasone  1-2 spray Both Nostrils Daily     calcitRIOL  0.25 mcg Oral Daily     calcium acetate  667 mg Oral TID w/meals     bumetanide  6 mg Oral BID     omeprazole  20 mg Oral Daily     losartan  100 mg Oral Daily     metoprolol  100 mg Oral Daily     insulin aspart  1-7 Units Subcutaneous TID AC     insulin aspart  1-5 Units Subcutaneous At Bedtime     vancomycin place bui - receiving intermittent dosing  1 each Does not apply See Admin Instructions            Allergies:     Allergies   Allergen  Reactions     Norco [Hydrocodone-Acetaminophen] Nausea and Vomiting     Cats      Throat tightness     Penicillins Hives     Seasonal Allergies      rhinitis     Shrimp      Throat closes             Physical Exam:   Vitals were reviewed  Ranges for his vital signs:  Temp:  [97.9  F (36.6  C)-101.6  F (38.7  C)] 97.9  F (36.6  C)  Pulse:  [] 86  Resp:  [16-20] 16  BP: (103-134)/(66-76) 121/76  SpO2:  [96 %-98 %] 97 %    Physical Examination:  GENERAL:  well-developed, well-nourished, in bed in no acute distress.   HEENT:  Head is normocephalic, atraumatic   EYES:  Eyes have anicteric sclerae without conjunctival injection or stigmata of endocarditis.    ENT:  Oropharynx is moist without exudates or ulcers. Tongue is midline  NECK:  Supple. No  Cervical lymphadenopathy  LUNGS:  Clear to auscultation bilateral.   CARDIOVASCULAR:  Regular rate and rhythm with possible third and fourth heart sounds  ABDOMEN:  Normal bowel sounds, soft, diffusely tender to mild palpation. No appreciable hepatosplenomegaly  SKIN:  No acute rashes.  PD catheter site in place without any surrounding erythema or exudate.  NEUROLOGIC:  Grossly nonfocal. Active x4 extremities         Laboratory Data:     Inflammatory Markers    Recent Labs   Lab Test  07/05/17   1204  05/31/17   1111  05/17/17   1214  04/13/17   0651  04/12/17   0935  04/11/17   1319  01/13/16   1337   SED   --    --    --    --    --    --   80*   CRP  35.4*  15.9*  39.3*  270.0*  200.0*  130.0*   --        Hematology Studies  Recent Labs   Lab Test  01/09/18   0802  01/08/18   0815  01/07/18   1655  11/14/17   0645  11/13/17   1709  08/02/17   1311   07/05/17   1204  06/26/17   0140   05/31/17   1111   WBC  3.1*  4.4  6.3  10.1  9.6  7.1   --   11.9*  8.3   < >  8.0   ANEU   --   3.3  5.3   --   8.4*   --    --   10.9*  7.6   --   6.5   AEOS   --   0.2  0.1   --   0.1   --    --   0.0  0.1   --   0.1   HGB  9.2*  9.0*  10.0*  9.1*  8.9*  10.4*   < >  8.9*  9.4*   < >   9.9*   MCV  99  99  99  104*  101*  92   --   91  94   < >  92   PLT  269  256  277  234  277  221   --   267  203   < >  302    < > = values in this interval not displayed.       Immune Globulin Studies  Recent Labs   Lab Test  05/19/15   1000   IGG  1380   IGM  108   IGA  203       Metabolic Studies   Recent Labs   Lab Test  01/09/18   0802  01/08/18   0815  01/07/18   1655  11/21/17   1239  11/15/17   0758   NA  135  138  137  142  144   POTASSIUM  3.0*  3.3*  3.7  4.6  3.2*   CHLORIDE  97  99  97  102  103   CO2  28  29  28  29  30   BUN  41*  42*  42*  66*  69*   CR  7.38*  6.91*  7.10*  7.65*  6.98*   GFRESTIMATED  8*  8*  8*  7*  8*       Hepatic Studies  Recent Labs   Lab Test  01/07/18   1655  11/13/17   1709  08/02/17   1311  07/05/17   1204  06/21/17   1058  05/31/17   1111  05/17/17   1214  04/28/17   0842   BILITOTAL  0.4  0.8   --   0.5   --   0.5  0.6  0.5   ALKPHOS  76  104   --   98   --   97  86  129   ALBUMIN  1.8*  2.3*  3.2*  2.7*  2.9*  2.7*  2.6*  2.7*   AST  32  22   --   13   --   18  16  11   ALT  24  26   --   15   --   16  12  13       Thyroid Studies    Recent Labs   Lab Test  04/12/17   0935  04/09/15   0900   TSH  1.26  3.47       Microbiology:  Culture Micro   Date Value Ref Range Status   01/07/2018 Culture negative monitoring continues  Preliminary   01/07/2018 No growth after 2 days  Preliminary   01/07/2018 No growth after 2 days  Preliminary   06/26/2017 No growth  Final   06/26/2017 No growth  Final   04/12/2017 No growth  Final   04/12/2017 No growth  Final   12/30/2011 No Beta Streptococcus isolated  Final   01/23/2011 No Beta Streptococcus isolated  Final   10/28/2005 No Beta Streptococcus isolated  Final   02/12/2005 No Beta Streptococcus isolated  Final   12/22/2004 No Beta Streptococcus isolated  Final       Urine Studies    Recent Labs   Lab Test  05/03/17   1344  04/08/17   1720  08/10/15   0758  05/05/15   1110  07/03/13   1410   LEUKEST  Negative  Negative  Negative   Negative  Trace*   WBCU  1  7*  1  2  2     CT A/P w/o Contrast 1/8/18  IMPRESSION:   1. Moderate intraperitoneal free air collecting in the upper abdomen,  likely be secondary to intraperitoneal administration of antibiotics  in this patient with a peritoneal dialysis catheter, though, given  this patient's history of peritonitis, a gas-forming organism may be  contributing. Perforated viscous could have a similar appearance in  the appropriate context.  Small amount of fluid adjacent to the  peritoneal dialysis catheter tip in the pelvis, within expected  limits.  2. Mildly dilated appendix without significant periappendiceal fat  stranding. Recommend clinical correlation for acute appendicitis.  3. Colonic diverticulosis without evidence of diverticulitis.  4. Mild bibasilar groundglass opacities may represent atelectasis  given associated peripheral/subpleural linear atelectasis versus  scarring, though infection remains a possibility.  5. Marked cardiomegaly.

## 2018-01-09 NOTE — PROGRESS NOTES
"PERITONEAL DIALYSIS CYCLER TREATMENT NOTE      Date:  1/9/2018  Time:  5:29 PM    Data:   Treatment Ultrafiltrate Volume mL:  Last night's UF -301mL. TTV 8000mL   Total Duration of Therapy: 8 hrs   Fill Volume: 2000 mL  Heater Bag: Volume 6L and Dextrose Concentration 4.25%, Ca 2.5  Side Bag(s): Volume 6L  and Dextrose Concentration 4.25%, Ca 2.5  Total Number of Cycles: 4   Post Weight after Last Fill: kg 88.8    Assessment:   PD Patient Response:  Tolerating well. Dressing is CDI, catheter secured. Fluid remains cloudy, will check cell count on first cycle with this evening's therapy.    Interventions:   Cycler set with above prescription. Therapy initiated by PD RN. Sample bag attached for cell count.  \"just in time\" training on sample collection done with floor nurse.   Hand off given to RN.        Plan:     Next tx per renal team.   "

## 2018-01-09 NOTE — PLAN OF CARE
Problem: Infection, Risk/Actual (Adult)  Intervention: Prevent Infection/Maximize Resistance  VSS. Pt up ad edith. Tolerating Renal diet. Tramadol given X2 for pain. Colace given for constipation. Voids spont. No insulin given per sliding scale. Pt self hooks up peritoneal dialysis before HOS. PIV saline locked. Cont. POC.    Pt declines bedside report.

## 2018-01-09 NOTE — PROGRESS NOTES
[facility], [place]    Internal Medicine Progress Note - [facility] Service    Main Plans for Today   Continue abx course  ID and surgery consulted      Assessment & Plan   Murray Nicholson is a 63 year old male with history of T2DM, MGUS, HLD, HTN, ESRD (likely 2/2 DM,HTN) on PD, HFrEF (EF - 20-25% with severe global hypokinesis), anemia of chronic disease and gout on allopurinol and prednisone who is admitted for further treatment of bacterial peritonitis that has failed outpatient treatment. Patient hemodynamically stable.      #Bacterial peritonitis s/p treatment failure (9 day intraperitoneal course of Ceftazidime).  #ESRD 2/2 T2Dm, HTN - on PD  Patient with abdominal pain, cloudy dialysate and s/p 9 day course of intraperitoneal ceftazidime for suspected peritonitis. Cultures were done at [facility]. Per Nephrology note, peritoneal fluid notable for E.Coli,  Pseudomonas and Streptococcus. Patient presented to ED due to worsening abdominal symptoms. Fluid studies shows 4733 WBCs on peritoneal dialysate with 80% neutrophils on 1/7/2018. Lactate WNL. No fevers or leukocytosis. Outside culture with e coli, pseudomonas and streptococcus (in hard chart).  Symptoms and vitals are stable. CT abdomen 1/8/18 with mildly enlarged appendix (? Appendicitis or source of infection)  - Surgery consulted for possible appendicitis  - ID consulted for abx recommendations   - IV Vancomycin for streptococcus coverage  - IV Ceftazidime for pseudomonal/E.Coli coverage  - Monitor vital signs closely  - PD fluid gram stain with many WBCs and culture NGTD  - BC NGTD  - Nephrology consulted, appreciate recs  - Per nephrology, can continue PD for now.   - Continue PTA calcitriol, and phoslo     #T2DM  Last A1C - 6.4 in 6/2017. On Glipizide PTA. No acute issues.   - Hold Glipizide due to hypoglycemia risk. Will resume at discharge  - MD SSI  - Glucose checks  - Hypoglycemia protocol     #HFrEF (EF 20-25% on 4/2017 TTE) -  Stage C, NYHA class III  #CAD  #HTN  #HLD  #Ascending aortic aneurysm  No cardiorespiratory symptoms on presentation. On Losartan, Toprol and ASA. Last Echo 4/2017 with  coronary angiogram yet to be done, although ordered. Ascending aortic aneurysm 4.8cm as at last check 8/2/2017.      - Continue PTA ASA 81mg daily  - Continue PTA Losartan 100mg daily  - Continue  PTA Toprol XL 100mg daily  - Continue PTA Bumex 6mg BID  - Continue PTA Atorvastatin 40mg daily  - Continue PTA Amlodipine 5 mg daily  - [facility] clinic referral on d/c  - Hold PTA Hydralazine, Spironolactone, Torsemide, Imdur- BP okay      #Cough.   Patient reporting mild non-productive cough. CXR with no acute findings. Seems like postnasal drip per description.   - Tessalon Perles  - Continue to monitor.     CHRONIC PROBLEMS  #Gout. Stable. Continue Allopurinol, will hold Prednisone given ongoing infection.  #MGUS. Stable.  #Anemia of chronic disease: Stable    #GERD. Stable. On Omeprazole         Medications held:  Hydralazine  Glipizide  Imdur  Prednisone  Spironolactone  Torsemide    Diet: Combination Diet Renal Diet; 1850-6862 Calories: Moderate Consistent CHO (4-6 CHO units/meal)  Fluids: none  DVT Prophylaxis: Pneumatic Compression Devices  Code Status: Full Code    Disposition Plan   Expected discharge: 2 - 3 days, recommended to prior living arrangement once adequate pain management/ tolerating PO medications and antibiotic plan established.     Entered: Mateo Singh 01/09/2018, 5:48 PM   Information in the above section will display in the discharge planner report.      The patient's care was discussed with the Attending Physician, Dr. JONES and Bedside Nurse.    Mateo Singh MD, MPH  Medicine-Pediatrics PGY4  [phone]      Please see sticky note for cross cover information    Interval History   Patient states that he feel like his pain is a bit better today. No nausea or vomiting. He has not had a bowel movement in the last 24 hours and would like more  meds for this. He had a fever overnight per chart review but was not cultured. LA was normal. He has some coughing in the AM and feels like he has an accumulation of phlegm in early morning that is difficult to cough up. No dysphagia or feeling like food gets stuck.     24 hours notes reviewed.     Physical Exam   Vital Signs: Temp: 97.9  F (36.6  C) Temp src: Oral BP: 121/76 Pulse: 86   Resp: 16 SpO2: 97 % O2 Device: None (Room air)    Weight: 195 lbs 12.3 oz  General Appearance: Well appearing, no distress, conversant today   Respiratory: CTAB, no wheezing or crackles  Cardiovascular: RRR, no m/r/g  GI: soft, tender to palpation diffusely but mostly in lower quadrants, some guarding, no rebound  Skin: no rashes or concerning lesions, no jaundice  Neuro: AOx4       Data   Medications     peritoneal dialysis solutions ADULT         cefTAZidime  1 g Intravenous Q24H     zz extemporaneous template  400 mg Oral Daily     amLODIPine  5 mg Oral Daily     aspirin  81 mg Oral Daily     atorvastatin  40 mg Oral Daily     fluticasone  1-2 spray Both Nostrils Daily     calcitRIOL  0.25 mcg Oral Daily     calcium acetate  667 mg Oral TID w/meals     bumetanide  6 mg Oral BID     omeprazole  20 mg Oral Daily     losartan  100 mg Oral Daily     metoprolol  100 mg Oral Daily     insulin aspart  1-7 Units Subcutaneous TID AC     insulin aspart  1-5 Units Subcutaneous At Bedtime     vancomycin place bui - receiving intermittent dosing  1 each Does not apply See Admin Instructions     Data     Recent Labs  Lab 01/09/18  0802 01/08/18  0815 01/07/18  1655   WBC 3.1* 4.4 6.3   HGB 9.2* 9.0* 10.0*   MCV 99 99 99    256 277    138 137   POTASSIUM 3.0* 3.3* 3.7   CHLORIDE 97 99 97   CO2 28 29 28   BUN 41* 42* 42*   CR 7.38* 6.91* 7.10*   ANIONGAP 10 10 12   TRINI 7.8* 7.6* 8.2*   * 194* 171*   ALBUMIN  --   --  1.8*   PROTTOTAL  --   --  6.0*   BILITOTAL  --   --  0.4   ALKPHOS  --   --  76   ALT  --   --  24   AST   --   --  32

## 2018-01-09 NOTE — CONSULTS
General Surgery Consultation    Murray Nicholson  MRN#: [id number]    Date of Admission:  1/7/2018    Date of Consult: 1/9/2018    Reason for consult: Concern for appendicitis in patient with bacterial peritonitis       Requesting service: Lucas Sue       Requesting provider: Dr. Singh                   Assessment and Plan:   Assessment:   63 year old man with a h/o ESRD on PD since ~8/2017, HFrEF (EF 20-25%), gout on prednisone 5mg QD at home, DM, MGUS, HTN, HLD, and ascending aortic aneurysm who started having diffuse abdominal pain on 12/28/2017 and was diagnostic with bacterial peritonitis. He initially improved with ceftazidime via his PD catheter but his pain returned on 1/7/2017 so he was admitted and started on IV ceftazidime and Vancomycin with improvement in his pain. However, yesterday he had more severe abdominal pain and a CT scan was obtained demonstrating moderate free air and a dilated appendix to 10mm. WBC wnl except for mild new leukopenia at 3.1 this morning. Febrile last night to 101.6, but otherwise HDS and is nontoxic appearing.    With a 10-day course of pain, would expect more significant CT scan findings including fat stranding if patient has appendicitis. Additionally, patient is tender throughout his abdomen with, in fact, least tenderness in RLQ. Therefore, very low suspicion that the patient has appendicitis. His exam and clinical history is much more consistent with bacterial peritonitis, likely from PD catheter access. Additionally, the patient is a poor surgical candidate due to his underlying comorbidities. If the patient did need treatment for acute appendicitis, would recommend antibiotics only without surgical intervention.    The amount of free air on CT seen is consistent with the patient being on PD and having instillation of antibiotics recently. Clinically, if free air was due to bowel perforation the patient would likely appear clinically much more ill.      Plan:   - Recommend  continuing antibiotics as you are for bacterial peritonitis. Wound not add any additional treatment for possible appendicitis given low clinical concern.  - Recommend re-testing peritoneal fluid to assess if WBC is improving, appreciate ID recs.  - If removal of PD catheter is indicated, would recommend contacting the surgeon who placed the PD catheter regarding removal.  - General Surgery will continue to follow. Please do not hesitate to contact us with questions, concerns, or changes in patient's status.      Seen and discussed with chief resident Dr. Johnson and staff Dr. Amaya.  - - - - - - - - - - - - - - - - - -  Pushpa Fuller MD  General Surgery PGY-2            History of Present Illness:   Murray Nicholson is a 63 year old man with a h/o ESRD on PD since ~8/2017, HFrEF (EF 20-25%), gout on prednisone 5mg QD at home, DM, MGUS, HTN, HLD, and ascending aortic aneurysm on whom General Surgery is consulted for possible appendicitis. He was admitted on 1/7/2018 for bacterial peritonitis that had failed outpatient treatment. He initially started noticing cloudy dialysate around 12/24/2017; on 12/28/2017 he started having sharp, severe, diffuse abdominal pain. He went to his outpatient PD facility on 12/29/2017 and was diagnosed with bacterial peritonitis (per the patient, they cultured fluid that he thinks has been growing E coli). He had some mild nausea with his pain but no emesis and had been having normal BMs. He was started on ceftazidime via PD catheter dwell and his pain improved.     However, on 1/7/2018 his abdominal pain worsened significantly in severity (7-8/10 as opposed to 2-3/10); the quality did not change. He had had temperatures in the 100s on 1/6-8/2018 and was still on ceftazidime via PD catheter at that time. He did not have any nausea or emesis at that time. He did have an episode of incontinent diarrhea on 1/7/2018 before he came to the ED. Because he was feeling so poorly with abdominal  pain, he presented to the ED and was admitted. His WBC was wnl. Peritoneal fluid on 1/7/2018 demonstrated 4733 WBCs with 80% PMNs; cultures are NGTD.  He was initiated on IV ceftazidime and Vancomycin with improvement in his pain to 3-4/10. However, yesterday morning his abdominal pain worsened back to 8/10 in severity so a CT scan was obtained that demonstrated a moderate amount of free air as well as appendiceal dilation to 10mm without periappendiceal fat stranding. He was febrile to 101.6 overnight but WBC this morning remains wnl. Lactic 1.8.    Otherwise, the patient has been in his usual state of health. Last ate today.         Past Medical History:     Past Medical History:   Diagnosis Date     (HFpEF) heart failure with preserved ejection fraction (H)      Allergic rhinitis, cause unspecified      Anemia of chronic kidney failure      Ascending aortic aneurysm (H)      Bicuspid aortic valve      CAD (coronary artery disease)      Chronic kidney disease, stage 5 (H)      Congestive heart failure, unspecified      Dyslipidemia      Esophageal reflux      Hypersomnia with sleep apnea, unspecified      Hypertension      MGUS (monoclonal gammopathy of unknown significance)      SHEELA (obstructive sleep apnea)      Systolic heart failure (H)      Type 2 diabetes mellitus (H)              Past Surgical History:     Past Surgical History:   Procedure Laterality Date     LAPAROSCOPIC HERNIORRHAPHY INGUINAL BILATERAL Bilateral 7/24/2015    Procedure: LAPAROSCOPIC HERNIORRHAPHY INGUINAL BILATERAL;  Surgeon: Bobby Mcconnell MD;  Location:  OR     LAPAROSCOPIC INSERTION CATHETER PERITONEAL DIALYSIS N/A 6/22/2017    Procedure: LAPAROSCOPIC INSERTION CATHETER PERITONEAL DIALYSIS;  Laparoscopic Peritoneal Dialysis Catheter Placement - Anesthesia with block;  Surgeon: Esteban Arvizu MD;  Location:  OR             Social History:   No current tobacco use, very rare EtOH use. Lives alone in [place], but visits often  with sons who live locally and provide support. Works at [employer].    Social History   Substance Use Topics     Smoking status: Former Smoker     Packs/day: 1.00     Years: 19.00     Types: Cigarettes     Quit date: 1994     Smokeless tobacco: Never Used     Alcohol use No             Family History:     Negative for bleeding disorders, clotting disorders, or problems with anesthesia.    Family History   Problem Relation Age of Onset     C.A.D. Father       from-never knew father-age 60     DIABETES Father      CEREBROVASCULAR DISEASE Father      Hypertension No family hx of      Breast Cancer No family hx of      Cancer - colorectal No family hx of      Prostate Cancer No family hx of      KIDNEY DISEASE No family hx of                 Allergies:     Allergies   Allergen Reactions     Norco [Hydrocodone-Acetaminophen] Nausea and Vomiting     Cats      Throat tightness     Penicillins Hives     Seasonal Allergies      rhinitis     Shrimp      Throat closes              Medications:     No current facility-administered medications on file prior to encounter.   Current Outpatient Prescriptions on File Prior to Encounter:  torsemide (DEMADEX) 100 MG tablet Take 1 tablet (100 mg) by mouth 2 times daily   calcium acetate, Phos Binder, 667 MG TABS Take 1 tablet by mouth 3 times daily (with meals)   predniSONE (DELTASONE) 5 MG tablet Take 1 tablet (5 mg) by mouth daily   predniSONE (DELTASONE) 20 MG tablet Take 2 tablets (40 mg) by mouth daily for 3 days for gout flares   amLODIPine (NORVASC) 5 MG tablet Take 1 tablet (5 mg) by mouth daily   metoprolol (TOPROL XL) 100 MG 24 hr tablet Take 1 tablet (100 mg) by mouth daily   allopurinol (ZYLOPRIM) 100 MG tablet Take 1 tablet (100 mg) by mouth daily Take with 300 mg tabs for 400 mg /day total.   allopurinol (ZYLOPRIM) 300 MG tablet Take 1 tablet (300 mg) by mouth daily Take with 100 mg tabs for 400 mg /day total.   losartan (COZAAR) 100 MG tablet TAKE 1 TABLET BY  MOUTH DAILY   albuterol (PROAIR HFA/PROVENTIL HFA/VENTOLIN HFA) 108 (90 BASE) MCG/ACT Inhaler Inhale 2 puffs into the lungs every 6 hours as needed for shortness of breath / dyspnea or wheezing   fluticasone (FLONASE) 50 MCG/ACT spray Spray 1-2 sprays into both nostrils daily   spironolactone (ALDACTONE) 100 MG tablet Take 0.5 tablets (50 mg) by mouth daily   bumetanide (BUMEX) 2 MG tablet Take 3 tablets (6 mg) by mouth 2 times daily   glipiZIDE (GLUCOTROL) 5 MG tablet Take 1 tablet by mouth. TAKE 1 TABLET BY MOUTH DAILY BEFORE A MEAL   calcitRIOL (ROCALTROL) 0.25 MCG capsule Take 1 capsule (0.25 mcg) by mouth daily   atorvastatin (LIPITOR) 40 MG tablet Take 1 tablet (40 mg) by mouth daily   isosorbide mononitrate (IMDUR) 60 MG 24 hr tablet Take 1 tablet (60 mg) by mouth daily   hydrALAZINE (APRESOLINE) 50 MG tablet Take 1 tablet (50 mg) by mouth 3 times daily   omeprazole (PRILOSEC) 20 MG CR capsule Take 20 mg by mouth daily At night   loratadine (CLARITIN) 10 MG tablet Take 10 mg by mouth daily as needed Reported on 5/3/2017   ASPIRIN 81 MG OR TABS Take 1 tablet (81 mg) by mouth at bedtime             Review of Systems:   10-point ROS otherwise negative except as noted above.          Physical Exam:     Temp:  [98.3  F (36.8  C)-101.6  F (38.7  C)] 98.5  F (36.9  C)  Pulse:  [] 96  Resp:  [18-20] 18  BP: (103-134)/(63-74) 103/70  SpO2:  [96 %-99 %] 97 %     General: AAOx4, NAD, pleasant, lying comfortably in bed  CV: mildly tachycardic, regular rhythm, warm, well-perfused  Pulm: no dyspnea, breathing comfortably on RA  Abd: soft, non-distended, no tympany, moderate ttp throughout abdomen with diffuse voluntary guarding but no rigidity, pain and very mild guarding with percussion, no pain with wiggling hips, no increased pain in RLQ (in fact, able to palpate deeply before patient guards in RLQ)  Extremities: trace pitting edema in BLE    Neuro: moving all extremities spontaneously without apparent  deficit    I/O last 3 completed shifts:  In: 360 [P.O.:360]  Out: 529 [Urine:500; Other:29]          Data:   Labs:    Complete Blood Count     Recent Labs  Lab 01/09/18  0802 01/08/18  0815 01/07/18  1655   WBC 3.1* 4.4 6.3   HGB 9.2* 9.0* 10.0*    256 277       Basic Metabolic Panel    Recent Labs  Lab 01/09/18  0802 01/08/18  0815 01/07/18  1655    138 137   POTASSIUM 3.0* 3.3* 3.7   CHLORIDE 97 99 97   CO2 28 29 28   BUN 41* 42* 42*   CR 7.38* 6.91* 7.10*   * 194* 171*   TRINI 7.8* 7.6* 8.2*       Liver Function Tests    Recent Labs  Lab 01/07/18  1655   AST 32   ALT 24   ALKPHOS 76   BILITOTAL 0.4   ALBUMIN 1.8*     Lactate    Recent Labs  Lab 01/09/18  0026 01/07/18  1655   LACT 1.8 1.1     Peritoneal fluid 1/7/2018:  Results for SANCHEZ MCNEIL (MRN [id number]) as of 1/9/2018 14:35   Ref. Range 1/7/2018 19:11   Color Fluid Unknown Yellow   Appearance Fluid Unknown Slightly Cloudy   WBC Fluid Latest Units: /uL 4733   % Neutrophils Fluid Latest Units: % 80   % Other Cells Fluid Latest Units: % 20       Imaging:   Results for orders placed or performed during the hospital encounter of 01/07/18   CT Abdomen Pelvis w/o Contrast     Value    Radiologist flags Intraperitoneal free air .  Appendix finding also (Urgent)    Narrative    EXAMINATION: CT ABDOMEN PELVIS W/O CONTRAST, 1/8/2018 4:49 PM    TECHNIQUE:  Helical CT images from the lung bases through the pubic  symphysis were obtained without IV contrast.     COMPARISON: 11/13/2017 abdominal radiographs; 5/19/2015 renal  ultrasound 11/22/2006 abdominal MRI. 1/7/2018 chest radiograph.    HISTORY: Patient with bacterial peritonitis, assess for abscess or  other abnormalities, patient with significant abdominal pain;     FINDINGS:    Abdomen and pelvis: Moderate intraperitoneal free air collecting in  the upper abdomen. Peritoneal dialysis catheter enters just left of  the umbilicus and coils in the right lower quadrant with a small  amount of fluid  surrounding the coiled tip. No focal intraperitoneal  collection Normal caliber small and large bowel. Numerous distal  colonic diverticula. Small hiatal hernia. The appendix is mildly  dilated measuring up to 10 mm without significant periappendiceal fat  stranding. No pneumatosis or portal venous gas.    The liver, gallbladder, pancreas, spleen, and adrenal glands are  unremarkable. Mild bilateral renal atrophy. Simple cortical cyst  arises from the anterior midpole of the right kidney. No  hydronephrosis, hydroureter, or urinary tract stone. The bladder is  within normal limits. Dystrophic calcifications in the enlarged  prostate. Symmetric seminal vesicles.    No abdominal or pelvic lymphadenopathy. Marked atherosclerotic  calcification of the abdominal aorta and iliac arteries without  aneurysmal dilation. Small free fluid in the pelvis.    Lung bases:  Bibasilar subpleural atelectasis versus scarring and mild  groundglass opacities. Mucous plugging in the medial left lower lobe.  Cardiomegaly. No pericardial effusion.     Bones and soft tissues: Moderate osseous degenerative change. No acute  or aggressive osseous lesion.      Impression    IMPRESSION:   1. Moderate intraperitoneal free air collecting in the upper abdomen,  likely be secondary to intraperitoneal administration of antibiotics  in this patient with a peritoneal dialysis catheter, though, given  this patient's history of peritonitis, a gas-forming organism may be  contributing. Perforated viscous could have a similar appearance in  the appropriate context.  Small amount of fluid adjacent to the  peritoneal dialysis catheter tip in the pelvis, within expected  limits.  2. Mildly dilated appendix without significant periappendiceal fat  stranding. Recommend clinical correlation for acute appendicitis.  3. Colonic diverticulosis without evidence of diverticulitis.  4. Mild bibasilar groundglass opacities may represent atelectasis  given associated  peripheral/subpleural linear atelectasis versus  scarring, though infection remains a possibility.  5. Marked cardiomegaly.    [Urgent Result: Intraperitoneal free air ].  Appendix finding also  reviewed over the phone by Dr. Hill.    Finding was identified on 1/8/2018 6:22 PM.     Dr. Farrell was contacted by Dr. Hill at 1/8/2018 7:40 PM and  verbalized understanding of the urgent finding.      I have personally reviewed the examination and initial interpretation  and I agree with the findings.    KIRA BERKOWITZ MD

## 2018-01-09 NOTE — PLAN OF CARE
Problem: Patient Care Overview  Goal: Plan of Care/Patient Progress Review  Outcome: No Change   Patient alert and orientated. Continues to be on IV therapy.  Max temp at 2243 hours was 101.6 Sepsis triggered. Lactic acid came back at 1.8. Most recent temp at 0527 hours is 99.6. Patient slightly tachycardiac but within parameters. Patient currently connected to peritonitis machine that was set up in previous shift by Peritoneal  Dialysis nurse. Pt Stated that he experiences  Pain when he moves in bed. He declined pain medications and stated he will notify nurse when in pain.Patient slept comfortably in between cares.  Please continue to monitor vitals. Patient would like bed side report.

## 2018-01-10 LAB
ANION GAP SERPL CALCULATED.3IONS-SCNC: 8 MMOL/L (ref 3–14)
BASOPHILS # BLD AUTO: 0 10E9/L (ref 0–0.2)
BASOPHILS NFR BLD AUTO: 0.6 %
BUN SERPL-MCNC: 39 MG/DL (ref 7–30)
CALCIUM SERPL-MCNC: 7.8 MG/DL (ref 8.5–10.1)
CHLORIDE SERPL-SCNC: 98 MMOL/L (ref 94–109)
CO2 SERPL-SCNC: 31 MMOL/L (ref 20–32)
CREAT SERPL-MCNC: 8.48 MG/DL (ref 0.66–1.25)
DIFFERENTIAL METHOD BLD: ABNORMAL
EOSINOPHIL # BLD AUTO: 0.3 10E9/L (ref 0–0.7)
EOSINOPHIL NFR BLD AUTO: 8.9 %
ERYTHROCYTE [DISTWIDTH] IN BLOOD BY AUTOMATED COUNT: 17.1 % (ref 10–15)
GFR SERPL CREATININE-BSD FRML MDRD: 6 ML/MIN/1.7M2
GLUCOSE BLDC GLUCOMTR-MCNC: 191 MG/DL (ref 70–99)
GLUCOSE BLDC GLUCOMTR-MCNC: 235 MG/DL (ref 70–99)
GLUCOSE BLDC GLUCOMTR-MCNC: 95 MG/DL (ref 70–99)
GLUCOSE SERPL-MCNC: 120 MG/DL (ref 70–99)
HCT VFR BLD AUTO: 31.3 % (ref 40–53)
HGB BLD-MCNC: 9.6 G/DL (ref 13.3–17.7)
IMM GRANULOCYTES # BLD: 0.1 10E9/L (ref 0–0.4)
IMM GRANULOCYTES NFR BLD: 2.8 %
LYMPHOCYTES # BLD AUTO: 0.6 10E9/L (ref 0.8–5.3)
LYMPHOCYTES NFR BLD AUTO: 18.4 %
MCH RBC QN AUTO: 30.5 PG (ref 26.5–33)
MCHC RBC AUTO-ENTMCNC: 30.7 G/DL (ref 31.5–36.5)
MCV RBC AUTO: 99 FL (ref 78–100)
MONOCYTES # BLD AUTO: 0.4 10E9/L (ref 0–1.3)
MONOCYTES NFR BLD AUTO: 11.7 %
NEUTROPHILS # BLD AUTO: 1.9 10E9/L (ref 1.6–8.3)
NEUTROPHILS NFR BLD AUTO: 57.6 %
NRBC # BLD AUTO: 0 10*3/UL
NRBC BLD AUTO-RTO: 0 /100
PLATELET # BLD AUTO: 250 10E9/L (ref 150–450)
POTASSIUM SERPL-SCNC: 3.1 MMOL/L (ref 3.4–5.3)
RBC # BLD AUTO: 3.15 10E12/L (ref 4.4–5.9)
SODIUM SERPL-SCNC: 138 MMOL/L (ref 133–144)
VANCOMYCIN SERPL-MCNC: 21.7 MG/L
WBC # BLD AUTO: 3.3 10E9/L (ref 4–11)

## 2018-01-10 PROCEDURE — 25000132 ZZH RX MED GY IP 250 OP 250 PS 637: Performed by: HOSPITALIST

## 2018-01-10 PROCEDURE — 25000131 ZZH RX MED GY IP 250 OP 636 PS 637: Performed by: INTERNAL MEDICINE

## 2018-01-10 PROCEDURE — 27210995 ZZH RX 272: Performed by: STUDENT IN AN ORGANIZED HEALTH CARE EDUCATION/TRAINING PROGRAM

## 2018-01-10 PROCEDURE — 85004 AUTOMATED DIFF WBC COUNT: CPT | Performed by: INTERNAL MEDICINE

## 2018-01-10 PROCEDURE — 00000146 ZZHCL STATISTIC GLUCOSE BY METER IP

## 2018-01-10 PROCEDURE — 25000128 H RX IP 250 OP 636: Performed by: STUDENT IN AN ORGANIZED HEALTH CARE EDUCATION/TRAINING PROGRAM

## 2018-01-10 PROCEDURE — 40000095 ZZH STATISTIC LEVEL 2 EST PATIENT

## 2018-01-10 PROCEDURE — 80048 BASIC METABOLIC PNL TOTAL CA: CPT | Performed by: INTERNAL MEDICINE

## 2018-01-10 PROCEDURE — 27210995 ZZH RX 272: Performed by: INTERNAL MEDICINE

## 2018-01-10 PROCEDURE — 12000008 ZZH R&B INTERMEDIATE UMMC

## 2018-01-10 PROCEDURE — 25000132 ZZH RX MED GY IP 250 OP 250 PS 637: Performed by: INTERNAL MEDICINE

## 2018-01-10 PROCEDURE — 25000132 ZZH RX MED GY IP 250 OP 250 PS 637: Performed by: STUDENT IN AN ORGANIZED HEALTH CARE EDUCATION/TRAINING PROGRAM

## 2018-01-10 PROCEDURE — 99233 SBSQ HOSP IP/OBS HIGH 50: CPT | Mod: GC | Performed by: INTERNAL MEDICINE

## 2018-01-10 PROCEDURE — 80202 ASSAY OF VANCOMYCIN: CPT | Performed by: INTERNAL MEDICINE

## 2018-01-10 PROCEDURE — 36415 COLL VENOUS BLD VENIPUNCTURE: CPT | Performed by: INTERNAL MEDICINE

## 2018-01-10 PROCEDURE — 85027 COMPLETE CBC AUTOMATED: CPT | Performed by: INTERNAL MEDICINE

## 2018-01-10 RX ORDER — POTASSIUM CHLORIDE 20MEQ/15ML
40 LIQUID (ML) ORAL ONCE
Status: COMPLETED | OUTPATIENT
Start: 2018-01-10 | End: 2018-01-10

## 2018-01-10 RX ORDER — SPIRONOLACTONE 25 MG/1
50 TABLET ORAL DAILY
Status: DISCONTINUED | OUTPATIENT
Start: 2018-01-10 | End: 2018-01-20 | Stop reason: HOSPADM

## 2018-01-10 RX ORDER — GENTAMICIN SULFATE 1 MG/G
CREAM TOPICAL DAILY PRN
Status: DISCONTINUED | OUTPATIENT
Start: 2018-01-10 | End: 2018-01-11

## 2018-01-10 RX ORDER — AMOXICILLIN 250 MG
1 CAPSULE ORAL 2 TIMES DAILY
Status: DISCONTINUED | OUTPATIENT
Start: 2018-01-10 | End: 2018-01-20 | Stop reason: HOSPADM

## 2018-01-10 RX ORDER — PREDNISONE 2.5 MG/1
5 TABLET ORAL DAILY
Status: DISCONTINUED | OUTPATIENT
Start: 2018-01-10 | End: 2018-01-20 | Stop reason: HOSPADM

## 2018-01-10 RX ORDER — BISACODYL 10 MG
10 SUPPOSITORY, RECTAL RECTAL DAILY PRN
Status: DISCONTINUED | OUTPATIENT
Start: 2018-01-10 | End: 2018-01-20 | Stop reason: HOSPADM

## 2018-01-10 RX ORDER — TORSEMIDE 100 MG/1
100 TABLET ORAL
Status: DISCONTINUED | OUTPATIENT
Start: 2018-01-10 | End: 2018-01-20 | Stop reason: HOSPADM

## 2018-01-10 RX ADMIN — INSULIN ASPART 1 UNITS: 100 INJECTION, SOLUTION INTRAVENOUS; SUBCUTANEOUS at 18:55

## 2018-01-10 RX ADMIN — OMEPRAZOLE 20 MG: 20 CAPSULE, DELAYED RELEASE ORAL at 07:45

## 2018-01-10 RX ADMIN — SODIUM CHLORIDE, SODIUM LACTATE, CALCIUM CHLORIDE, MAGNESIUM CHLORIDE AND DEXTROSE: 4.25; 538; 448; 18.3; 5.08 INJECTION, SOLUTION INTRAPERITONEAL at 19:01

## 2018-01-10 RX ADMIN — TORSEMIDE 100 MG: 100 TABLET ORAL at 16:30

## 2018-01-10 RX ADMIN — ALLOPURINOL 400 MG: 300 TABLET ORAL at 10:08

## 2018-01-10 RX ADMIN — CALCIUM ACETATE 667 MG: 667 CAPSULE ORAL at 07:45

## 2018-01-10 RX ADMIN — PREDNISONE 5 MG: 2.5 TABLET ORAL at 14:23

## 2018-01-10 RX ADMIN — TRAMADOL HYDROCHLORIDE 50 MG: 50 TABLET, COATED ORAL at 21:15

## 2018-01-10 RX ADMIN — POTASSIUM CHLORIDE 40 MEQ: 40 SOLUTION ORAL at 16:30

## 2018-01-10 RX ADMIN — LOSARTAN POTASSIUM 100 MG: 50 TABLET ORAL at 07:47

## 2018-01-10 RX ADMIN — POLYETHYLENE GLYCOL 3350 17 G: 17 POWDER, FOR SOLUTION ORAL at 07:44

## 2018-01-10 RX ADMIN — BUMETANIDE 6 MG: 2 TABLET ORAL at 07:45

## 2018-01-10 RX ADMIN — BISACODYL 10 MG: 10 SUPPOSITORY RECTAL at 18:01

## 2018-01-10 RX ADMIN — CEFTAZIDIME 1 G: 1 INJECTION, POWDER, FOR SOLUTION INTRAMUSCULAR; INTRAVENOUS at 16:29

## 2018-01-10 RX ADMIN — ACETAMINOPHEN 650 MG: 325 TABLET ORAL at 02:49

## 2018-01-10 RX ADMIN — ACETAMINOPHEN 650 MG: 325 TABLET ORAL at 21:15

## 2018-01-10 RX ADMIN — CALCIUM ACETATE 667 MG: 667 CAPSULE ORAL at 14:23

## 2018-01-10 RX ADMIN — CALCITRIOL 0.25 MCG: 0.25 CAPSULE, LIQUID FILLED ORAL at 07:45

## 2018-01-10 RX ADMIN — SENNOSIDES AND DOCUSATE SODIUM 1 TABLET: 8.6; 5 TABLET ORAL at 19:37

## 2018-01-10 RX ADMIN — DOCUSATE SODIUM 100 MG: 100 CAPSULE, LIQUID FILLED ORAL at 07:55

## 2018-01-10 RX ADMIN — SENNOSIDES AND DOCUSATE SODIUM 1 TABLET: 8.6; 5 TABLET ORAL at 14:25

## 2018-01-10 RX ADMIN — AMLODIPINE BESYLATE 5 MG: 5 TABLET ORAL at 07:45

## 2018-01-10 RX ADMIN — SPIRONOLACTONE 50 MG: 25 TABLET ORAL at 16:30

## 2018-01-10 RX ADMIN — FLUTICASONE PROPIONATE 2 SPRAY: 50 SPRAY, METERED NASAL at 07:48

## 2018-01-10 RX ADMIN — ATORVASTATIN CALCIUM 40 MG: 40 TABLET, FILM COATED ORAL at 21:15

## 2018-01-10 RX ADMIN — METOPROLOL SUCCINATE 100 MG: 100 TABLET, EXTENDED RELEASE ORAL at 07:46

## 2018-01-10 RX ADMIN — TRAMADOL HYDROCHLORIDE 50 MG: 50 TABLET, COATED ORAL at 02:49

## 2018-01-10 RX ADMIN — ASPIRIN 81 MG CHEWABLE TABLET 81 MG: 81 TABLET CHEWABLE at 07:45

## 2018-01-10 RX ADMIN — CALCIUM ACETATE 667 MG: 667 CAPSULE ORAL at 18:58

## 2018-01-10 ASSESSMENT — PAIN DESCRIPTION - DESCRIPTORS
DESCRIPTORS: SORE;SHARP
DESCRIPTORS: ACHING

## 2018-01-10 NOTE — PLAN OF CARE
Problem: Infection, Risk/Actual (Adult)  Intervention: Prevent Infection/Maximize Resistance  VSS. Pt up ad edith in room. Voids spont. Tolerating renal diet. Insulin given per sliding scale. Pain controlled with Tramadol and Tylenol X1. Pt currently infusing peritoneal dialysis, will finish up around 0230, Pt will unhook from machine. PIV saline locked. Cont. POC.    Pt declines bedside report.

## 2018-01-10 NOTE — PROGRESS NOTES
[facility], [place]    Internal Medicine Progress Note - [facility] Service    Main Plans for Today   Continue abx course  Switch bumex to torsemide (patient taking torsemide at home not bumex)  Restart home spironolactone  Replace potassium     Assessment & Plan   Murray Nicholson is a 63 year old male with history of T2DM, MGUS, HLD, HTN, ESRD (likely 2/2 DM,HTN) on PD, HFrEF (EF - 20-25% with severe global hypokinesis), anemia of chronic disease and gout on allopurinol and prednisone who is admitted for further treatment of bacterial peritonitis that has failed outpatient treatment. Patient hemodynamically stable.      #Bacterial peritonitis s/p treatment failure (9 day intraperitoneal course of Ceftazidime).  #ESRD 2/2 T2Dm, HTN - on PD  Patient with abdominal pain, cloudy dialysate and s/p 9 day course of intraperitoneal ceftazidime for suspected peritonitis. Cultures were done at [facility]. Per Nephrology note, peritoneal fluid notable for E.Coli,  Pseudomonas and Streptococcus. Patient presented to ED due to worsening abdominal symptoms. Fluid studies shows 4733 WBCs on peritoneal dialysate with 80% neutrophils on 1/7/2018. Lactate WNL. No fevers or leukocytosis. Outside culture with e coli, pseudomonas and streptococcus (in hard chart).  Symptoms and vitals are stable. CT abdomen 1/8/18 with mildly enlarged appendix, but surgery does not think it is appendicitis.   - Surgery consulted, appreciate recs  - ID consulted for abx recommendations   - IV Vancomycin for streptococcus coverage (no further speciation/susceptibilities per Da Eleanor)  - IV Ceftazidime for pseudomonal/E.Coli coverage  - Monitor vital signs closely  - PD fluid WBC count improving- will trend   - BC NGTD  - Nephrology consulted, appreciate recs  - Per nephrology, can continue PD for now.   - Continue PTA calcitriol, and phoslo    # Hypokalemia  - Replace PRN     #T2DM  Last A1C - 6.4 in 6/2017. On Glipizide PTA. No acute  issues.   - Hold Glipizide due to hypoglycemia risk. Will resume at discharge  - MD DELGADILLO  - Glucose checks  - Hypoglycemia protocol     #HFrEF (EF 20-25% on 4/2017 TTE) - Stage C, NYHA class III  #CAD  #HTN  #HLD  #Ascending aortic aneurysm  No cardiorespiratory symptoms on presentation. On Losartan, Toprol and ASA. Last Echo 4/2017 with  coronary angiogram yet to be done, although ordered. Ascending aortic aneurysm 4.8cm as at last check 8/2/2017.      - Continue PTA ASA 81mg daily  - Continue PTA Losartan 100mg daily  - Continue  PTA Toprol XL 100mg daily  - Continue PTA Torsemide 100mg BID (was on bumex but currently on torsemide which is a recent change)  - Continue PTA Atorvastatin 40mg daily  - Continue PTA Amlodipine 5 mg daily  - [facility] clinic referral on d/c  - Restart PTA spironolactone 50mg daily   - Hold PTA Hydralazine Imdur- BP okay      #Cough.   Patient reporting mild non-productive cough. CXR with no acute findings. Seems like postnasal drip per description.    - Tessalon Perles  - Continue home flonase   - Continue to monitor.     CHRONIC PROBLEMS  #Gout. Stable. Continue Allopurinol, restart prednisone  #MGUS. Stable.  #Anemia of chronic disease: Stable    #GERD. Stable. On Omeprazole         Medications held:  Hydralazine  Glipizide  Imdur    Diet: Combination Diet Renal Diet; 9417-2282 Calories: Moderate Consistent CHO (4-6 CHO units/meal)  Fluids: none  DVT Prophylaxis: Pneumatic Compression Devices  Code Status: Full Code    Disposition Plan   Expected discharge: 1-2 days?, recommended to prior living arrangement once adequate pain management/ tolerating PO medications and antibiotic plan established.     Entered: Mateo Singh 01/10/2018, 3:03 PM   Information in the above section will display in the discharge planner report.      The patient's care was discussed with the Attending Physician, Dr. JONES and Bedside Nurse.    Mateo Singh MD, MPH  Medicine-Pediatrics PGY4  [phone]    Please see sticky  note for cross cover information    Interval History   He feels improved today. No acute issues overnight. Concerned that he has not had a bowel movement but feels like he needs to go. He felt warm overnight but no fevers. Breathing is comfortable. Abdominal pain is better.     24 hours notes reviewed.     Physical Exam   Vital Signs: Temp: 97.5  F (36.4  C) Temp src: Oral BP: 128/72 Pulse: 96   Resp: 16 SpO2: 98 % O2 Device: None (Room air)    Weight: 195 lbs 12.3 oz  General Appearance: Well appearing, no distress, walking around room, good energy   Respiratory: CTAB, no wheezing or crackles  Cardiovascular: RRR, no m/r/g  GI: soft, minimal tenderness to palpation of left lower quadrant, no guarding, no rebound, active bowel sounds   Skin: no rashes or concerning lesions, no jaundice  Neuro: AOx4   Ext: 1+ edema in ankles      Data   Medications        torsemide  100 mg Oral BID     predniSONE  5 mg Oral Daily     senna-docusate  1 tablet Oral BID     [START ON 1/11/2018] zz extemporaneous template  100 mg Oral Daily     cefTAZidime  1 g Intravenous Q24H     amLODIPine  5 mg Oral Daily     aspirin  81 mg Oral Daily     atorvastatin  40 mg Oral Daily     fluticasone  1-2 spray Both Nostrils Daily     calcitRIOL  0.25 mcg Oral Daily     calcium acetate  667 mg Oral TID w/meals     omeprazole  20 mg Oral Daily     losartan  100 mg Oral Daily     metoprolol  100 mg Oral Daily     insulin aspart  1-7 Units Subcutaneous TID AC     insulin aspart  1-5 Units Subcutaneous At Bedtime     vancomycin place bui - receiving intermittent dosing  1 each Does not apply See Admin Instructions     Data     Recent Labs  Lab 01/10/18  1015 01/09/18  0802 01/08/18  0815 01/07/18  1655   WBC 3.3* 3.1* 4.4 6.3   HGB 9.6* 9.2* 9.0* 10.0*   MCV 99 99 99 99    269 256 277    135 138 137   POTASSIUM 3.1* 3.0* 3.3* 3.7   CHLORIDE 98 97 99 97   CO2 31 28 29 28   BUN 39* 41* 42* 42*   CR 8.48* 7.38* 6.91* 7.10*   ANIONGAP 8 10  10 12   TRINI 7.8* 7.8* 7.6* 8.2*   * 177* 194* 171*   ALBUMIN  --   --   --  1.8*   PROTTOTAL  --   --   --  6.0*   BILITOTAL  --   --   --  0.4   ALKPHOS  --   --   --  76   ALT  --   --   --  24   AST  --   --   --  32

## 2018-01-10 NOTE — PLAN OF CARE
Problem: Patient Care Overview  Goal: Plan of Care/Patient Progress Review  Outcome: Improving  Patient alert and orientated. All vitals stable. Pain managed with prn tylenol with desired effect. Slept comfortably between cares. Peritoneal  dialysis completed this shift. Voiding small amounts of urine. Continue with plan of care. Would like bedside report if awake.

## 2018-01-10 NOTE — PLAN OF CARE
Problem: Patient Care Overview  Goal: Individualization & Mutuality  Outcome: Improving  Alert and oriented x 4. Afebrile. Receives vi antibiotic.On room air. Vital signs table. Denies pain. Independent with own care. Good oral intake. No BM since the 7th of January 2018. Had oral bowel medications and would like to hold on Bisacodyl suppository for now.Resting in his bed, watching TV. Plan:Continue care.PD  at night.

## 2018-01-10 NOTE — PROGRESS NOTES
Nephrology Progress Note  01/10/2018           ASSESSMENT AND RECOMMENDATIONS:   Murray Nicholson is a 63 year old male with a PMH of ESRD sec to likely DMII and HTN , CAD , HFrEF 20-25%, HLD, MGUS, SHEELA , AAA, Gout on prednisone and allopurinol, GERD, admitted with peritonitis diagnosed 12/29 outpatient and was treated with ceftazidime 1500mg intraperitoneal, presents with cloudy PD fluid , fatigue and abdominal pain.  Nephrology following for ESRD mangement     Peritonitis with polymicrobial infection  Repeat cytology and Cx sent on 1/7. Cx NTD. Blood Cx NGTD.  Grossly cloudy PD fluid on admission cell counts >4000 ->decreased to 457     Peritoneal fluid comparatively clear today , abdominal pain and tenderness persists but improved to 3/10  Has been afebrile for more than 24 hours     - continue ceftazidime and vancomycin per ID recommendation  - will question a role for gentamicin to ID  - WBC counts on fluids decreased to 457  - Daily cell count from peritoneal fluid   - renal panel daily with daily weights    ESRD on PD since 06/2017  [facility] under Dr Mcpherson's care  PD prescription: 8hrs , 4 cycles , FV 2000, 2hr dwell , dextrose variable  UF on average 300-900  EDW 86Kg (190lbs)  Kt/V 1.5 PTA  Pt seems to be still volume overloaded  - PD today :8hrs, x4 cycles 2hr dwell with 4.25% FV 2000     Mild hypervolemia with edema  Hypotensive   Will follow with PD, 4.25% dextrose for today  UF -521 last night  Bilateral edema+, but improving      Electrolytes  Hypokalemia  Repleted with potassium 40meq on 1/9      MBD  Phos 7.2, (10/17)  Calcium 7.8, albumin 1.8  On phoslo and Vit D     Anemia  Hb 9.2  On EPO 64650 units Qmonthly  Transfuse if <7  Iron replete per PTA labs      Recommendations were communicated to primary team.      Staffed with Dr. Otoole.  Betty Coffey MD PhD  Internal Medicine PGY-1   [phone]    Interval History :   In the last 24 hours Murray Nicholson has been doing well.  Noticed peritoneal fluid is more clear. Less abdominal pain.  Review of Systems:   I reviewed the following systems:  GI: Good appetite. - nausea or vomiting or diarrhea.   Neuro:  -confusion  Constitutional:  -fever or chills  CV: -dyspnea or edema.  - chest pain.    Physical Exam:   I/O last 3 completed shifts:  In: 901 [P.O.:600; Other:301]  Out: 750 [Urine:750]   /72 (BP Location: Right arm)  Pulse 96  Temp 97.5  F (36.4  C) (Oral)  Resp 16  Wt 88.8 kg (195 lb 12.3 oz)  SpO2 98%  BMI 28.91 kg/m2     GENERAL APPEARANCE: NAD  EYES:  No scleral icterus, pupils equal  HENT: mouth without ulcers or lesions  PULM: lungs clear to auscultation,  bilaterally, equal air movement, no clubbing  CV: regular rhythm, normal rate, no rub     -JVD -     -edema bilateral   GI: soft, diffuse tender, slightly distended, bowel sounds are present  INTEGUMENT: no cyanosis, no rash  NEURO:  no asterixis     Labs:   All labs reviewed by me  Electrolytes/Renal -   Recent Labs   Lab Test  01/09/18   0802  01/08/18   0815  01/07/18   1655  11/21/17   1239  11/15/17   0758  11/14/17   0645   08/02/17   1311   NA  135  138  137  142  144  141   < >  139   POTASSIUM  3.0*  3.3*  3.7  4.6  3.2*  3.7   < >  4.1   CHLORIDE  97  99  97  102  103  103   < >  102   CO2  28  29  28  29  30  27   < >  22   BUN  41*  42*  42*  66*  69*  76*   < >  137*   CR  7.38*  6.91*  7.10*  7.65*  6.98*  6.85*   < >  6.74*   GLC  177*  194*  171*  189*  294*  312*   < >  139*   TRINI  7.8*  7.6*  8.2*  8.9  8.9  8.9   < >  8.8   MAG   --    --    --   1.9  1.9  1.8   --    --    PHOS   --    --    --    --   5.0*  4.3   --   4.8*    < > = values in this interval not displayed.       CBC -   Recent Labs   Lab Test  01/09/18   0802  01/08/18   0815  01/07/18   1655   WBC  3.1*  4.4  6.3   HGB  9.2*  9.0*  10.0*   PLT  269  256  277       LFTs -   Recent Labs   Lab Test  01/07/18   1655  11/13/17   1709  08/02/17   1311  07/05/17   1204   ALKPHOS  76  104    --   98   BILITOTAL  0.4  0.8   --   0.5   ALT  24  26   --   15   AST  32  22   --   13   PROTTOTAL  6.0*  6.0*   --   6.2*   ALBUMIN  1.8*  2.3*  3.2*  2.7*       Iron Panel -   Recent Labs   Lab Test  07/19/17   1306  07/05/17   1204  06/21/17   1058   IRON  46  26*  69   IRONSAT  18  12*  29   ALBERTINA  369  542*  557*       Current Medications:    cefTAZidime  1 g Intravenous Q24H     zz extemporaneous template  400 mg Oral Daily     amLODIPine  5 mg Oral Daily     aspirin  81 mg Oral Daily     atorvastatin  40 mg Oral Daily     fluticasone  1-2 spray Both Nostrils Daily     calcitRIOL  0.25 mcg Oral Daily     calcium acetate  667 mg Oral TID w/meals     bumetanide  6 mg Oral BID     omeprazole  20 mg Oral Daily     losartan  100 mg Oral Daily     metoprolol  100 mg Oral Daily     insulin aspart  1-7 Units Subcutaneous TID AC     insulin aspart  1-5 Units Subcutaneous At Bedtime     vancomycin place bui - receiving intermittent dosing  1 each Does not apply See Admin Instructions        Betty Coffey MD

## 2018-01-10 NOTE — PHARMACY-VANCOMYCIN DOSING SERVICE
Pharmacy Vancomycin Note  Date of Service January 10, 2018  Patient's  1955   63 year old, male    Indication: Peritonitis  Goal Trough Level: 15-20 mg/L  Day of Therapy: 4  Current Vancomycin regimen:  Intermittent dosing    Current estimated CrCl = CrCl cannot be calculated (Unknown ideal weight.).    Creatinine for last 3 days  2018:  4:55 PM Creatinine 7.10 mg/dL  2018:  8:15 AM Creatinine 6.91 mg/dL  2018:  8:02 AM Creatinine 7.38 mg/dL  1/10/2018: 10:15 AM Creatinine 8.48 mg/dL    Recent Vancomycin Levels (past 3 days)  1/10/2018: 10:15 AM Vancomycin Level 21.7 mg/L    Vancomycin IV Administrations (past 72 hours)      No vancomycin orders with administrations in past 72 hours.                Nephrotoxins and other renal medications (Future)    Start     Dose/Rate Route Frequency Ordered Stop    01/10/18 1600  torsemide (DEMADEX) tablet 100 mg      100 mg Oral 2 TIMES DAILY. 01/10/18 1157      18  vancomycin place bui - receiving intermittent dosing      1 each Does not apply SEE ADMIN INSTRUCTIONS 18               Contrast Orders - past 72 hours     None          Interpretation of levels and current regimen:  Trough level is  Supratherapeutic    Urine output:  unable to determine    Renal Function: ESRD on PD    Plan:  1. Vancomycin no dose today  2.  Recheck vancomycin level in 2-3 days and dose based on levels  3. Serum creatinine levels will be ordered daily for the first week of therapy and at least twice weekly for subsequent weeks.      Kiara Stringer, PharmD

## 2018-01-10 NOTE — PROGRESS NOTES
Surgery Progress Note  1/10/2018     Subjective:  - [place]IN overnight.  - Pain well-controlled, improved from yesterday. Today, pain is more generalized and less sharp.. Denies N/V. Passing flatus, last BM 3 days ago. Eating and drinking okay.    Objective:  Temp:  [97.5  F (36.4  C)-98.9  F (37.2  C)] 97.5  F (36.4  C)  Pulse:  [86-96] 96  Resp:  [16-18] 16  BP: (103-128)/(69-76) 128/72  SpO2:  [97 %-100 %] 98 %  I/O last 3 completed shifts:  In: 901 [P.O.:600; Other:301]  Out: 750 [Urine:750]    Gen: Awake, alert, NAD   Resp: NLB on RA  Abd: Soft, ND, mild TTP without pain to palpation in RLQ  Ext: WWP    BMP  Recent Labs  Lab 01/09/18  0802 01/08/18  0815 01/07/18  1655    138 137   POTASSIUM 3.0* 3.3* 3.7   CHLORIDE 97 99 97   TRINI 7.8* 7.6* 8.2*   CO2 28 29 28   BUN 41* 42* 42*   CR 7.38* 6.91* 7.10*   * 194* 171*     CBC  Recent Labs  Lab 01/09/18  0802 01/08/18  0815 01/07/18  1655   WBC 3.1* 4.4 6.3   RBC 3.01* 2.92* 3.17*   HGB 9.2* 9.0* 10.0*   HCT 29.9* 28.9* 31.4*   MCV 99 99 99   MCH 30.6 30.8 31.5   MCHC 30.8* 31.1* 31.8   RDW 17.4* 17.4* 17.6*    256 277     INRNo lab results found in last 7 days.   AST/ALT & Alk Phos  Recent Labs  Lab 01/07/18  1655   AST 32   ALT 24   ALKPHOS 76     Bili  Recent Labs   Lab Test  01/07/18   1655  11/13/17   1709  07/05/17   1204  05/31/17   1111  05/17/17   1214   05/19/15   1000   BILITOTAL  0.4  0.8  0.5  0.5  0.6   < >  0.4   DBIL   --    --    --    --   0.1   --   0.1    < > = values in this interval not displayed.     Lipase/AmlyaseNo lab results found in last 7 days.    A/P: 63 year old man with a h/o ESRD on PD since ~8/2017, HFrEF (EF 20-25%), gout on prednisone 5mg QD at home, DM, MGUS, HTN, HLD, and ascending aortic aneurysm who started having diffuse abdominal pain on 12/28/2017 and had lab studies diagnostic of bacterial peritonitis; we were consulted for concern of appendicitis.    - No surgical intervention required at this time, we will  sign-off but are readily available for any questions or concerns   - Exam not consistent with appendicitis, symptoms could be explained by peritonitis  - No BM for 3 days, would recommend primary team to initiate bowel regimen  - Agree with antibiotic management per primary team/ID    D/w staff.    Edwige Cunningham, DO  PGY1

## 2018-01-10 NOTE — PROGRESS NOTES
Nephrology Progress Note  01/09/2018       ASSESSMENT AND RECOMMENDATIONS:   Murray Nicholson is a 63 year old male with a PMH of ESRD sec to likely DMII and HTN , CAD , HFrEF 20-25%, HLD, MGUS, SHEELA , AAA, Gout on prednisone and allopurinol, GERD, admitted with peritonitis diagnosed 12/29 outpatient and was treated with ceftazidime 1500mg intraperitoneal, presents with cloudy PD fluid , fatigue and abdominal pain.  Nephrology following for ESRD mangement    Peritonitis with polymicrobial infection  Repeat cytology and Cx sent on 1/7. Cx NTD. Blood Cx NGTD.  Grossly cloudy PD fluid on admission cell counts >4000    Peritoneal fluid comparatively clear today , abdominal pain and tenderness persists but improved  He continues to be febrile with tmax 101    - was on IP ceftazidime which is sensitive for the E.coli and pseudomonas  - strep no sensitivity done, ?? Enterococcus ( per reports organism quant not adequate for sensitivities)  - will question a role for gentamicin here if the WBC counts are still elevated on todays fluids  - Daily cell count from peritoneal fluid   - renal panel daily with daily weights    ESRD on PD since 06/2017  [facility] under Dr Mcpherson's care  PD prescription: 8hrs , 4 cycles , FV 2000, 2hr dwell , dextrose variable  UF on average 300-900  EDW 86Kg (190lbs)  Kt/V 1.5 PTA  - PD today : 8hrs, x4 cycles 2hr dwell with 4.25% FV 2000    Mild hypervolemia with edema  Hypotensive   Will follow with PD , 4.25% dextrose for today  UF -301 last night  And significant basal crackles on exam     Electrolytes  Hypokalemia  Replete with potassium 40meq for today     MBD  Phos 7.2, (10/17)  Calcium 7.8, albumin 1.8  On phoslo and Vit D    Anemia  Hb 9.2  On EPO 83257 units Qmonthly  Transfuse if <7  Iron replete per PTA labs     Recommendations were communicated to primary team.     Staffed with Dr. Otoole.     Madyson Calle  Nephrology fellow   [phone]    Interval History :   In the last  24 hours Murray Nicholson has been doing well. Thinks his abdominal pain got somewhat better    Review of Systems:   I reviewed the following systems:  GI: good appetite. No nausea or vomiting or diarrhea.   Neuro:  No confusion  Constitutional:  No fever or chills  CV: No dyspnea. Bilateral edema. No chest pain.    Physical Exam:   I/O last 3 completed shifts:  In: 360 [P.O.:360]  Out: 550 [Urine:550]   /76 (BP Location: Right arm)  Pulse 86  Temp 97.9  F (36.6  C) (Oral)  Resp 16  Wt 88.8 kg (195 lb 12.3 oz)  SpO2 97%  BMI 28.91 kg/m2     GENERAL APPEARANCE: NAD  EYES: No scleral icterus, pupils equal  HENT: mouth without ulcers or lesions  PULM: lungs basal crakles auscultation, bilaterally, equal air movement, no clubbing  CV: regular rhythm, normal rate, no rub     -JVD no     -edema bilateral   GI: soft, diffuse tender, distended  INTEGUMENT: no cyanosis, no rash  NEURO:no asterixis     Labs:   All labs reviewed by me  Electrolytes/Renal -   Recent Labs   Lab Test  01/09/18   0802  01/08/18   0815  01/07/18   1655  11/21/17   1239  11/15/17   0758  11/14/17   0645   08/02/17   1311   NA  135  138  137  142  144  141   < >  139   POTASSIUM  3.0*  3.3*  3.7  4.6  3.2*  3.7   < >  4.1   CHLORIDE  97  99  97  102  103  103   < >  102   CO2  28  29  28  29  30  27   < >  22   BUN  41*  42*  42*  66*  69*  76*   < >  137*   CR  7.38*  6.91*  7.10*  7.65*  6.98*  6.85*   < >  6.74*   GLC  177*  194*  171*  189*  294*  312*   < >  139*   TRINI  7.8*  7.6*  8.2*  8.9  8.9  8.9   < >  8.8   MAG   --    --    --   1.9  1.9  1.8   --    --    PHOS   --    --    --    --   5.0*  4.3   --   4.8*    < > = values in this interval not displayed.     CBC -   Recent Labs   Lab Test  01/09/18   0802  01/08/18   0815  01/07/18   1655   WBC  3.1*  4.4  6.3   HGB  9.2*  9.0*  10.0*   PLT  269  256  277       LFTs -   Recent Labs   Lab Test  01/07/18   1655  11/13/17   1709  08/02/17   1311  07/05/17   1204   ALKPHOS  76  104    --   98   BILITOTAL  0.4  0.8   --   0.5   ALT  24  26   --   15   AST  32  22   --   13   PROTTOTAL  6.0*  6.0*   --   6.2*   ALBUMIN  1.8*  2.3*  3.2*  2.7*       Iron Panel -   Recent Labs   Lab Test  07/19/17   1306  07/05/17   1204  06/21/17   1058   IRON  46  26*  69   IRONSAT  18  12*  29   ALBERTINA  369  542*  557*     Current Medications:    cefTAZidime  1 g Intravenous Q24H     zz extemporaneous template  400 mg Oral Daily     amLODIPine  5 mg Oral Daily     aspirin  81 mg Oral Daily     atorvastatin  40 mg Oral Daily     fluticasone  1-2 spray Both Nostrils Daily     calcitRIOL  0.25 mcg Oral Daily     calcium acetate  667 mg Oral TID w/meals     bumetanide  6 mg Oral BID     omeprazole  20 mg Oral Daily     losartan  100 mg Oral Daily     metoprolol  100 mg Oral Daily     insulin aspart  1-7 Units Subcutaneous TID AC     insulin aspart  1-5 Units Subcutaneous At Bedtime     vancomycin place bui - receiving intermittent dosing  1 each Does not apply See Admin Instructions       peritoneal dialysis solutions ADULT       Madyson Calle MD

## 2018-01-11 ENCOUNTER — APPOINTMENT (OUTPATIENT)
Dept: GENERAL RADIOLOGY | Facility: CLINIC | Age: 63
DRG: 981 | End: 2018-01-11
Payer: COMMERCIAL

## 2018-01-11 LAB
ANION GAP SERPL CALCULATED.3IONS-SCNC: 8 MMOL/L (ref 3–14)
APPEARANCE FLD: CLEAR
BACTERIA SPEC CULT: NORMAL
BASOPHILS NFR FLD MANUAL: 4 %
BUN SERPL-MCNC: 40 MG/DL (ref 7–30)
CALCIUM SERPL-MCNC: 8 MG/DL (ref 8.5–10.1)
CHLORIDE SERPL-SCNC: 100 MMOL/L (ref 94–109)
CO2 SERPL-SCNC: 32 MMOL/L (ref 20–32)
COLOR FLD: COLORLESS
CREAT SERPL-MCNC: 8.66 MG/DL (ref 0.66–1.25)
EOSINOPHIL NFR FLD MANUAL: 4 %
ERYTHROCYTE [DISTWIDTH] IN BLOOD BY AUTOMATED COUNT: 16.8 % (ref 10–15)
GFR SERPL CREATININE-BSD FRML MDRD: 6 ML/MIN/1.7M2
GLUCOSE BLDC GLUCOMTR-MCNC: 161 MG/DL (ref 70–99)
GLUCOSE BLDC GLUCOMTR-MCNC: 162 MG/DL (ref 70–99)
GLUCOSE BLDC GLUCOMTR-MCNC: 208 MG/DL (ref 70–99)
GLUCOSE BLDC GLUCOMTR-MCNC: 352 MG/DL (ref 70–99)
GLUCOSE BLDC GLUCOMTR-MCNC: 369 MG/DL (ref 70–99)
GLUCOSE SERPL-MCNC: 195 MG/DL (ref 70–99)
HCT VFR BLD AUTO: 31.3 % (ref 40–53)
HGB BLD-MCNC: 9.5 G/DL (ref 13.3–17.7)
LYMPHOCYTES NFR FLD MANUAL: 29 %
MCH RBC QN AUTO: 30.2 PG (ref 26.5–33)
MCHC RBC AUTO-ENTMCNC: 30.4 G/DL (ref 31.5–36.5)
MCV RBC AUTO: 99 FL (ref 78–100)
MONOS+MACROS NFR FLD MANUAL: 28 %
NEUTS BAND NFR FLD MANUAL: 35 %
PLATELET # BLD AUTO: 307 10E9/L (ref 150–450)
POTASSIUM SERPL-SCNC: 3.5 MMOL/L (ref 3.4–5.3)
RBC # BLD AUTO: 3.15 10E12/L (ref 4.4–5.9)
RBC # FLD: NORMAL /UL
SODIUM SERPL-SCNC: 140 MMOL/L (ref 133–144)
SPECIMEN SOURCE FLD: NORMAL
SPECIMEN SOURCE: NORMAL
WBC # BLD AUTO: 3.9 10E9/L (ref 4–11)
WBC # FLD AUTO: 109 /UL

## 2018-01-11 PROCEDURE — 87633 RESP VIRUS 12-25 TARGETS: CPT | Performed by: INTERNAL MEDICINE

## 2018-01-11 PROCEDURE — 25000128 H RX IP 250 OP 636: Performed by: STUDENT IN AN ORGANIZED HEALTH CARE EDUCATION/TRAINING PROGRAM

## 2018-01-11 PROCEDURE — 00000146 ZZHCL STATISTIC GLUCOSE BY METER IP

## 2018-01-11 PROCEDURE — 80048 BASIC METABOLIC PNL TOTAL CA: CPT | Performed by: INTERNAL MEDICINE

## 2018-01-11 PROCEDURE — 27210995 ZZH RX 272: Performed by: INTERNAL MEDICINE

## 2018-01-11 PROCEDURE — 90999 UNLISTED DIALYSIS PROCEDURE: CPT

## 2018-01-11 PROCEDURE — 25000132 ZZH RX MED GY IP 250 OP 250 PS 637: Performed by: INTERNAL MEDICINE

## 2018-01-11 PROCEDURE — 85027 COMPLETE CBC AUTOMATED: CPT | Performed by: INTERNAL MEDICINE

## 2018-01-11 PROCEDURE — 25000128 H RX IP 250 OP 636: Performed by: INTERNAL MEDICINE

## 2018-01-11 PROCEDURE — 25000132 ZZH RX MED GY IP 250 OP 250 PS 637: Performed by: STUDENT IN AN ORGANIZED HEALTH CARE EDUCATION/TRAINING PROGRAM

## 2018-01-11 PROCEDURE — 71046 X-RAY EXAM CHEST 2 VIEWS: CPT

## 2018-01-11 PROCEDURE — 36415 COLL VENOUS BLD VENIPUNCTURE: CPT | Performed by: INTERNAL MEDICINE

## 2018-01-11 PROCEDURE — 99233 SBSQ HOSP IP/OBS HIGH 50: CPT | Mod: GC | Performed by: INTERNAL MEDICINE

## 2018-01-11 PROCEDURE — 25000131 ZZH RX MED GY IP 250 OP 636 PS 637: Performed by: INTERNAL MEDICINE

## 2018-01-11 PROCEDURE — 12000008 ZZH R&B INTERMEDIATE UMMC

## 2018-01-11 PROCEDURE — 89051 BODY FLUID CELL COUNT: CPT | Performed by: INTERNAL MEDICINE

## 2018-01-11 PROCEDURE — 25000131 ZZH RX MED GY IP 250 OP 636 PS 637: Performed by: STUDENT IN AN ORGANIZED HEALTH CARE EDUCATION/TRAINING PROGRAM

## 2018-01-11 PROCEDURE — 27210995 ZZH RX 272: Performed by: STUDENT IN AN ORGANIZED HEALTH CARE EDUCATION/TRAINING PROGRAM

## 2018-01-11 PROCEDURE — 25000132 ZZH RX MED GY IP 250 OP 250 PS 637: Performed by: HOSPITALIST

## 2018-01-11 RX ORDER — MAGNESIUM CARB/ALUMINUM HYDROX 105-160MG
296 TABLET,CHEWABLE ORAL ONCE
Status: COMPLETED | OUTPATIENT
Start: 2018-01-11 | End: 2018-01-11

## 2018-01-11 RX ORDER — HYDRALAZINE HYDROCHLORIDE 25 MG/1
25 TABLET, FILM COATED ORAL 3 TIMES DAILY
Status: DISCONTINUED | OUTPATIENT
Start: 2018-01-11 | End: 2018-01-20 | Stop reason: HOSPADM

## 2018-01-11 RX ORDER — GENTAMICIN SULFATE 1 MG/G
CREAM TOPICAL DAILY PRN
Status: DISCONTINUED | OUTPATIENT
Start: 2018-01-11 | End: 2018-01-15

## 2018-01-11 RX ADMIN — CALCIUM ACETATE 667 MG: 667 CAPSULE ORAL at 18:08

## 2018-01-11 RX ADMIN — TORSEMIDE 100 MG: 100 TABLET ORAL at 16:37

## 2018-01-11 RX ADMIN — ACETAMINOPHEN 650 MG: 325 TABLET ORAL at 21:57

## 2018-01-11 RX ADMIN — SODIUM CHLORIDE, SODIUM LACTATE, CALCIUM CHLORIDE, MAGNESIUM CHLORIDE AND DEXTROSE: 2.5; 538; 448; 18.3; 5.08 INJECTION, SOLUTION INTRAPERITONEAL at 17:37

## 2018-01-11 RX ADMIN — FLUTICASONE PROPIONATE 1 SPRAY: 50 SPRAY, METERED NASAL at 09:01

## 2018-01-11 RX ADMIN — HYDRALAZINE HYDROCHLORIDE 25 MG: 25 TABLET ORAL at 15:36

## 2018-01-11 RX ADMIN — ASPIRIN 81 MG CHEWABLE TABLET 81 MG: 81 TABLET CHEWABLE at 09:00

## 2018-01-11 RX ADMIN — ALLOPURINOL 100 MG: 100 TABLET ORAL at 09:00

## 2018-01-11 RX ADMIN — ACETAMINOPHEN 650 MG: 325 TABLET ORAL at 04:15

## 2018-01-11 RX ADMIN — SODIUM CHLORIDE, SODIUM LACTATE, CALCIUM CHLORIDE, MAGNESIUM CHLORIDE AND DEXTROSE: 4.25; 538; 448; 18.3; 5.08 INJECTION, SOLUTION INTRAPERITONEAL at 17:37

## 2018-01-11 RX ADMIN — CALCIUM ACETATE 667 MG: 667 CAPSULE ORAL at 09:01

## 2018-01-11 RX ADMIN — MAGESIUM CITRATE 296 ML: 1.75 LIQUID ORAL at 13:22

## 2018-01-11 RX ADMIN — LOSARTAN POTASSIUM 100 MG: 50 TABLET ORAL at 09:01

## 2018-01-11 RX ADMIN — CALCITRIOL 0.25 MCG: 0.25 CAPSULE, LIQUID FILLED ORAL at 09:00

## 2018-01-11 RX ADMIN — BENZONATATE 100 MG: 100 CAPSULE, LIQUID FILLED ORAL at 23:32

## 2018-01-11 RX ADMIN — INSULIN ASPART 1 UNITS: 100 INJECTION, SOLUTION INTRAVENOUS; SUBCUTANEOUS at 16:58

## 2018-01-11 RX ADMIN — SPIRONOLACTONE 50 MG: 25 TABLET ORAL at 09:00

## 2018-01-11 RX ADMIN — TRAMADOL HYDROCHLORIDE 50 MG: 50 TABLET, COATED ORAL at 04:15

## 2018-01-11 RX ADMIN — HYDRALAZINE HYDROCHLORIDE 25 MG: 25 TABLET ORAL at 20:47

## 2018-01-11 RX ADMIN — TORSEMIDE 100 MG: 100 TABLET ORAL at 09:00

## 2018-01-11 RX ADMIN — SENNOSIDES AND DOCUSATE SODIUM 1 TABLET: 8.6; 5 TABLET ORAL at 09:02

## 2018-01-11 RX ADMIN — INSULIN ASPART 1 UNITS: 100 INJECTION, SOLUTION INTRAVENOUS; SUBCUTANEOUS at 12:15

## 2018-01-11 RX ADMIN — AMLODIPINE BESYLATE 5 MG: 5 TABLET ORAL at 09:00

## 2018-01-11 RX ADMIN — VANCOMYCIN HYDROCHLORIDE: 10 INJECTION, POWDER, LYOPHILIZED, FOR SOLUTION INTRAVENOUS at 17:37

## 2018-01-11 RX ADMIN — INSULIN ASPART 3 UNITS: 100 INJECTION, SOLUTION INTRAVENOUS; SUBCUTANEOUS at 08:58

## 2018-01-11 RX ADMIN — CEFTAZIDIME 1 G: 1 INJECTION, POWDER, FOR SOLUTION INTRAMUSCULAR; INTRAVENOUS at 15:36

## 2018-01-11 RX ADMIN — ATORVASTATIN CALCIUM 40 MG: 40 TABLET, FILM COATED ORAL at 20:47

## 2018-01-11 RX ADMIN — POLYETHYLENE GLYCOL 3350 17 G: 17 POWDER, FOR SOLUTION ORAL at 09:42

## 2018-01-11 RX ADMIN — TRAMADOL HYDROCHLORIDE 50 MG: 50 TABLET, COATED ORAL at 21:57

## 2018-01-11 RX ADMIN — PREDNISONE 5 MG: 2.5 TABLET ORAL at 09:00

## 2018-01-11 RX ADMIN — CALCIUM ACETATE 667 MG: 667 CAPSULE ORAL at 12:15

## 2018-01-11 RX ADMIN — METOPROLOL SUCCINATE 100 MG: 100 TABLET, EXTENDED RELEASE ORAL at 09:00

## 2018-01-11 RX ADMIN — SENNOSIDES AND DOCUSATE SODIUM 1 TABLET: 8.6; 5 TABLET ORAL at 22:00

## 2018-01-11 RX ADMIN — OMEPRAZOLE 20 MG: 20 CAPSULE, DELAYED RELEASE ORAL at 09:01

## 2018-01-11 RX ADMIN — BENZONATATE 100 MG: 100 CAPSULE, LIQUID FILLED ORAL at 04:15

## 2018-01-11 ASSESSMENT — PAIN DESCRIPTION - DESCRIPTORS
DESCRIPTORS: ACHING
DESCRIPTORS: SORE

## 2018-01-11 NOTE — PROGRESS NOTES
Nephrology Progress Note  01/11/2018         ASSESSMENT AND RECOMMENDATIONS:   Murray Nicholson is a 63 year old male with a PMH of ESRD sec to likely DMII and HTN , CAD , HFrEF 20-25%, HLD, MGUS, SHEELA , AAA, Gout on prednisone and allopurinol, GERD, admitted with peritonitis diagnosed 12/29 outpatient and was treated with ceftazidime 1500mg intraperitoneal, presents with cloudy PD fluid , fatigue and abdominal pain.  Nephrology following for ESRD mangement and peritoneal infection      Peritonitis with polymicrobial infection  Repeat cytology and Cx sent on 1/7. Cx GNR, Yeast. Blood Cx NGTD.  Grossly cloudy PD fluid on admission cell counts >4000 ->decreased to 457->109  Peritoneal fluid clear today, abdominal pain and tenderness improved to 1-2/10  Has been afebrile for more than 48 hours      - continue ceftazidime and vancomycin per ID recommendation  - Will change to intraperitoneal for ceftazidime and vancomycin on 1/12(ordered), and monitor vancomycin level   - Daily cell count from peritoneal fluid   - renal panel daily with daily weights     [facility] PD [facility]  PD prescription: 8hrs , 4 cycles , FV 2000, 2hr dwell , dextrose variable  UF on average 300-900  EDW 86Kg (190lbs)  Kt/V 1.5 PTA  Pt seems to be still volume overloaded  - PD today :8hrs, x4 cycles 2hr dwell with 4.25% and 2.5%       Mild hypervolemia  Will follow with PD, 4.25% and 2.5% dextrose for today  UF -788 last night  Bilateral edema improving      Electrolytes  Hypokalemia  Repleted with potassium 40meq on 1/10.      MBD  Phos 7.2, (10/17)  Calcium 7.8, albumin 1.8  On phoslo and Vit D      Anemia  Hb 9.2  On EPO 69384 units Qmonthly  Transfuse if <7  Iron replete per PTA labs      Recommendations were communicated to primary team.      Staffed with Dr. Otoole.  Betty Coffey MD PhD  Internal Medicine PGY-1   [phone]     Interval History :   In the last 24 hours Murray Nicholson has  been doing well. Noticed peritoneal fluid is more clear. Less abdominal pain.  Review of Systems:   I reviewed the following systems:  GI: Good appetite. - nausea or vomiting or diarrhea.   Neuro:  -confusion  Constitutional:  -fever or chills  CV: -dyspnea or edema.  - chest pain.    Physical Exam:   I/O last 3 completed shifts:  In: 480 [P.O.:480]  Out: 696 [Urine:175; Other:521]   /74 (BP Location: Right arm)  Pulse 84  Temp 97.7  F (36.5  C) (Oral)  Resp 16  Wt 87.6 kg (193 lb 2 oz)  SpO2 98%  BMI 28.52 kg/m2     GENERAL APPEARANCE: NAD  EYES:  No scleral icterus, pupils equal  HENT: mouth without ulcers or lesions  PULM: lungs clear to auscultation,  bilaterally, equal air movement, no clubbing  CV: regular rhythm, normal rate, no rub     -JVD -     -edema bilateral slight  GI: soft, diffuse tender, slightly distended, bowel sounds are present  INTEGUMENT: no cyanosis, no rash  NEURO:  no asterixis     Labs:   All labs reviewed by me  Electrolytes/Renal -   Recent Labs   Lab Test  01/11/18   0945  01/10/18   1015  01/09/18   0802   11/21/17   1239  11/15/17   0758  11/14/17   0645   08/02/17   1311   NA  140  138  135   < >  142  144  141   < >  139   POTASSIUM  3.5  3.1*  3.0*   < >  4.6  3.2*  3.7   < >  4.1   CHLORIDE  100  98  97   < >  102  103  103   < >  102   CO2  32  31  28   < >  29  30  27   < >  22   BUN  40*  39*  41*   < >  66*  69*  76*   < >  137*   CR  8.66*  8.48*  7.38*   < >  7.65*  6.98*  6.85*   < >  6.74*   GLC  195*  120*  177*   < >  189*  294*  312*   < >  139*   TRINI  8.0*  7.8*  7.8*   < >  8.9  8.9  8.9   < >  8.8   MAG   --    --    --    --   1.9  1.9  1.8   --    --    PHOS   --    --    --    --    --   5.0*  4.3   --   4.8*    < > = values in this interval not displayed.       CBC -   Recent Labs   Lab Test  01/11/18   0945  01/10/18   1015  01/09/18   0802   WBC  3.9*  3.3*  3.1*   HGB  9.5*  9.6*  9.2*   PLT  307  250  269       LFTs -   Recent Labs   Lab Test   01/07/18   1655  11/13/17   1709  08/02/17   1311  07/05/17   1204   ALKPHOS  76  104   --   98   BILITOTAL  0.4  0.8   --   0.5   ALT  24  26   --   15   AST  32  22   --   13   PROTTOTAL  6.0*  6.0*   --   6.2*   ALBUMIN  1.8*  2.3*  3.2*  2.7*       Iron Panel -   Recent Labs   Lab Test  07/19/17   1306  07/05/17   1204  06/21/17   1058   IRON  46  26*  69   IRONSAT  18  12*  29   ALBERTINA  369  542*  557*         Current Medications:    insulin aspart  1-10 Units Subcutaneous TID AC     insulin aspart  1-7 Units Subcutaneous At Bedtime     magnesium citrate  296 mL Oral Once     [START ON 1/12/2018] allopurinol  400 mg Oral Daily     torsemide  100 mg Oral BID     predniSONE  5 mg Oral Daily     senna-docusate  1 tablet Oral BID     spironolactone  50 mg Oral Daily     cefTAZidime  1 g Intravenous Q24H     amLODIPine  5 mg Oral Daily     aspirin  81 mg Oral Daily     atorvastatin  40 mg Oral Daily     fluticasone  1-2 spray Both Nostrils Daily     calcitRIOL  0.25 mcg Oral Daily     calcium acetate  667 mg Oral TID w/meals     omeprazole  20 mg Oral Daily     losartan  100 mg Oral Daily     metoprolol succinate  100 mg Oral Daily     vancomycin place bui - receiving intermittent dosing  1 each Does not apply See Admin Instructions       peritoneal dialysis solutions ADULT       Betty Coffey MD

## 2018-01-11 NOTE — PLAN OF CARE
Problem: Patient Care Overview  Goal: Plan of Care/Patient Progress Review  Outcome: No Change  AVSS, however patient requested 2L nasal cannula for comfort. On a renal/diabetic diet. SSI and bedtime insulin given. 0200 BG check 437 and 352 - MD notified and instructed RN to recheck at 0400. BG check was 369. MD notified and changed the medium resistance SSI order to high resistance SSI. Shoulder and abdominal pain managed with Tylenol and Tramadol. PIV saline locked. Patient self managing peritoneal dialysis. Up ad edith. Voiding in small amounts. Passing gas, had a small mucus-like bowel movement. Complained of a new productive cough. Tessalon given per request without much improvement. Continue with plan of care - monitor VS, blood sugars and new cough.

## 2018-01-11 NOTE — PLAN OF CARE
Problem: Infection, Risk/Actual (Adult)  Goal: Identify Related Risk Factors and Signs and Symptoms  Related risk factors and signs and symptoms are identified upon initiation of Human Response Clinical Practice Guideline (CPG).   Alert and oriented x4. Completed his own peritoneal dialysis at 0900. Up ad edith in room. Reported pain in abdomen and related to cough, refused pain medication. BS active,abdomen round and soft. Pt reported feeling constipated. Given PRN miralax and scheduled senna and magnesium citrate. No bm thus far. Has adequate po intake. Voided small amount in AM. Lung sounds coarse in LLL and a slight wheeze in RLL. Pt down for chest xray at 1300. Awaiting results. BG elevated, 208 and 161. Trace edema in BLE. VSS. PIV SL. Tentative plan is for discharge tomorrow. Will continue to monitor pt closely.

## 2018-01-11 NOTE — PROGRESS NOTES
[facility], [place]    Internal Medicine Progress Note - [facility] Service    Main Plans for Today   Continue abx course  CXR and RVP for worsening cough  Restart home hydralazine  Will discuss with ID and nephrology today about abx plan and duration    Assessment & Plan   Murray Nicholson is a 63 year old male with history of T2DM, MGUS, HLD, HTN, ESRD (likely 2/2 DM,HTN) on PD, HFrEF (EF - 20-25% with severe global hypokinesis), anemia of chronic disease and gout on allopurinol and prednisone who is admitted for further treatment of bacterial peritonitis that has failed outpatient treatment. Patient hemodynamically stable.      #Bacterial peritonitis s/p treatment failure (9 day intraperitoneal course of Ceftazidime).  #ESRD 2/2 T2Dm, HTN - on PD  Patient with abdominal pain, cloudy dialysate and s/p 9 day course of intraperitoneal ceftazidime for suspected peritonitis. Cultures were done at [facility]. Per Nephrology note, peritoneal fluid notable for E.Coli,  Pseudomonas and Streptococcus. Patient presented to ED due to worsening abdominal symptoms. Fluid studies shows 4733 WBCs on peritoneal dialysate with 80% neutrophils on 1/7/2018. Lactate WNL. No fevers or leukocytosis. Outside culture with e coli, pseudomonas and streptococcus (in hard chart).  Symptoms and vitals are stable. CT abdomen 1/8/18 with mildly enlarged appendix, but surgery does not think it is appendicitis. WBC count in peritoneal fluid is down trending. Patient is overall improving.   - Surgery consulted, appreciate recs- signed off  - ID consulted, appreciate recs  - IV Vancomycin for streptococcus coverage (no further speciation/susceptibilities per Da Eleanor)  - IV Ceftazidime for pseudomonal/E.Coli coverage  - Monitor vital signs closely  - PD fluid WBC count improving- will trend   - Pleural fluid from 1/7 growing gram neg rods and yeast on day 4 of culture   - Nephrology consulted, appreciate recs  - Per nephrology, can  continue PD for now.   - Continue PTA calcitriol, and phoslo    # Worsening Cough & Congestion  CXR on admission did not show any focal findings or effusion. Cough has worsened in the last day. Sputum stain showed normal tiffany. Suspicious for viral process vs pulmonary edema.   - Continue flonase  - Repeat CXR  - RVP ordered    # Hypokalemia  - Replace PRN    # Mild Leukopenia  Likely due to acute infection. Now trending up.   - Monitor     #T2DM  Last A1C - 6.4 in 6/2017. On Glipizide PTA. No acute issues.   - Hold Glipizide due to hypoglycemia risk. Will resume at discharge  - MD DELGADILLO  - Glucose checks  - Hypoglycemia protocol     #HFrEF (EF 20-25% on 4/2017 TTE) - Stage C, NYHA class III  #CAD  #HTN  #HLD  #Ascending aortic aneurysm  No cardiorespiratory symptoms on presentation. On Losartan, Toprol and ASA. Last Echo 4/2017 with  coronary angiogram yet to be done, although ordered. Ascending aortic aneurysm 4.8cm as at last check 8/2/2017.      - Continue PTA ASA 81mg daily  - Continue PTA Losartan 100mg daily  - Continue  PTA Toprol XL 100mg daily  - Continue PTA Torsemide 100mg BID (was on bumex but currently on torsemide which is a recent change)  - Continue PTA Atorvastatin 40mg daily  - Continue PTA Amlodipine 5 mg daily  - [facility] clinic referral on d/c  - Continue  PTA spironolactone 50mg daily   - Restart PTA Hydralazine at a lower dose 25mg TID (50mg TID at home)  - Hold Imdur- restart tomorrow      CHRONIC PROBLEMS  #Gout. Stable. Continue Allopurinol, restart prednisone  #MGUS. Stable.  #Anemia of chronic disease: Stable    #GERD. Stable. On Omeprazole         Medications held:  Imdur  Glipizide    Diet: Combination Diet Renal Diet; 1629-9753 Calories: Moderate Consistent CHO (4-6 CHO units/meal)  Fluids: none  DVT Prophylaxis: Pneumatic Compression Devices  Code Status: Full Code    Disposition Plan   Expected discharge: Tomorrow?, recommended to prior living arrangement once adequate pain management/  tolerating PO medications and antibiotic plan established.     Entered: Mateo Singh 01/11/2018, 1:50 PM   Information in the above section will display in the discharge planner report.      The patient's care was discussed with the Attending Physician, Dr. JONES and Bedside Nurse.    Mateo Singh MD, MPH  Medicine-Pediatrics PGY4  [phone]    Please see sticky note for cross cover information    Interval History   Still not having bowel movements yet. He felt like cough was worse last night and a bit more productive. Some shortness of breath but no desaturation. He used O2 overnight for comfort. Abdominal pain continues to improve. No fevers or chills.     24 hours notes reviewed.     Physical Exam   Vital Signs: Temp: 97.7  F (36.5  C) Temp src: Oral BP: 126/74 Pulse: 84   Resp: 16 SpO2: 98 % O2 Device: Nasal cannula Oxygen Delivery: 2 LPM  Weight: 193 lbs 1.97 oz  General Appearance: Well appearing, no distress, walking around room, good energy   Respiratory: CTAB, no wheezing or crackles  Cardiovascular: RRR, no m/r/g  GI: soft, non-tender to palpation, no guarding, no rebound, active bowel sounds   Skin: no rashes or concerning lesions, no jaundice  Neuro: AOx4   Ext: 1+ edema in ankles      Data   Medications     peritoneal dialysis solutions ADULT         insulin aspart  1-10 Units Subcutaneous TID AC     insulin aspart  1-7 Units Subcutaneous At Bedtime     [START ON 1/12/2018] allopurinol  400 mg Oral Daily     torsemide  100 mg Oral BID     predniSONE  5 mg Oral Daily     senna-docusate  1 tablet Oral BID     spironolactone  50 mg Oral Daily     cefTAZidime  1 g Intravenous Q24H     amLODIPine  5 mg Oral Daily     aspirin  81 mg Oral Daily     atorvastatin  40 mg Oral Daily     fluticasone  1-2 spray Both Nostrils Daily     calcitRIOL  0.25 mcg Oral Daily     calcium acetate  667 mg Oral TID w/meals     omeprazole  20 mg Oral Daily     losartan  100 mg Oral Daily     metoprolol succinate  100 mg Oral Daily      vancomycin place bui - receiving intermittent dosing  1 each Does not apply See Admin Instructions     Data     Recent Labs  Lab 01/11/18  0945 01/10/18  1015 01/09/18  0802  01/07/18  1655   WBC 3.9* 3.3* 3.1*  < > 6.3   HGB 9.5* 9.6* 9.2*  < > 10.0*   MCV 99 99 99  < > 99    250 269  < > 277    138 135  < > 137   POTASSIUM 3.5 3.1* 3.0*  < > 3.7   CHLORIDE 100 98 97  < > 97   CO2 32 31 28  < > 28   BUN 40* 39* 41*  < > 42*   CR 8.66* 8.48* 7.38*  < > 7.10*   ANIONGAP 8 8 10  < > 12   TRINI 8.0* 7.8* 7.8*  < > 8.2*   * 120* 177*  < > 171*   ALBUMIN  --   --   --   --  1.8*   PROTTOTAL  --   --   --   --  6.0*   BILITOTAL  --   --   --   --  0.4   ALKPHOS  --   --   --   --  76   ALT  --   --   --   --  24   AST  --   --   --   --  32   < > = values in this interval not displayed.

## 2018-01-11 NOTE — PROGRESS NOTES
"PERITONEAL DIALYSIS CYCLER TREATMENT NOTE      Date:  1/10/2018  Time:  7:06 PM    Data:   Treatment Ultrafiltrate Volume mL:  Last night's UF 521mL, TTV 8000mL   Total Duration of Therapy: 8hrs   Fill Volume: 2000 mL  Heater Bag: Volume 6L and Dextrose Concentration 4.25%, Ca 2.5  Side Bag(s): Volume 6L  and Dextrose Concentration 4.25%, Ca 2.5  Total Number of Cycles: 4   Post Weight after Last Fill: kg 87.6    Assessment:   PD Patient Response:  Tolerating well. Dressing is CDI, catheter secured with tape.    Interventions:   Cycler set with above prescription for this evening's therapy.  Sample bag attached for cell count with first cycle.  \"just in time\" training done with floor RN; hand off given,\.    Plan:     Next tx per renal. Continue with daily cell counts.  "

## 2018-01-11 NOTE — PROGRESS NOTES
[facility] ID SERVICE: ONGOING CONSULTATION   Murray Nicholson : 1955 Sex: male:   Medical record number [id number] Attending Physician: Breanne Miller MD  Date of Service: 2018  PROBLEM LIST:   1. Peritoneal Dialysis Related Bacterial Peritonitis - continue Vancomycin and Ceftazidime ( - )  2. ESRD 2/2 DMII/HTN on PD  3. Leukopenia  4. DMII  5. HFrEF 2/2 ICM  6. HTN  7. HLD  RECOMMENDATIONS:   -- continue IV Ceftazidime, ok to change to intraperitoneal at discharge ( - )  -- continue IV Vancomycin renally dosed per pharmacy, to to change to intraperitoneal at discharge ( - )  -- no need to repeat ascitic fluid analysis. Has a very nice improvement with antibiotics     DISCUSSION:   Patient had peritoneal fluid cultured on  that appears to be positive for E. Coli and Pseudomonas aeruginosa that are pansensitive and Streptococcus (not otherwise speciated). Patient has been treating himself with intraperitoneal Ceftazidime since , and this should have covered the organisms that he is growing fairly well. May have not covered the Strep spp. Appears to be improving with addition of Vancomycin. CT imaging is largely unremarkable and agree with surgical consultation that surgical intervention for appendicitis or other secondary source is unnecessary. Patient's sputum culture is pending with good gram stain with many mixed GP and GN bacteria. No fever since . Ascitic fluid has drastically improved as well which further supports that out current antibiotic regimen is good. Patient is growing yeast and GNR in ascitic fluid culture from , but given that it took 4 days to grow and only in the broth, is strongly suggestive that this is just a contaminant and given the patient's clinical improvement, would not change treatment. From our standpoint, patient is stable for discharge with change to intraperitoneal Ceftazidime and Vancomycin. Suggest working with pharmacy and care  coordinator for proper dosages of antibiotics and monitoring of Vancomycin level as outpatient. Will need 21 days of total treatment. No need to follow up with ID as outpatient unless issues arise, but encourage good follow up with Renal team.   Patient seen and discussed with my attending, Dr. Lawrence, who agrees with my assessment and plan as outlined above.   Thank you for this interesting consult. ID will sign off this patient. Please do not hesitate to call with questions.   Ryley Marcus MD  PGY-3 Internal Medicine/[facility]  P: [phone]     SUBJECTIVE:  Nursing notes reviewed and NAEON. Small, mucus-like BM on 1/11 (last BM was 1/7). Requesting 2L O2 for comfort. Continues to have cough. Patient denies fever/chills. Abdominal pain is improved. Denies nausea, just poor appetite to food being offered. No vomiting. Ambulating well.   ROS:  A five-point review of systems was obtained and was negative with the exception of that which is described above.  Allergies   Allergen Reactions     Norco [Hydrocodone-Acetaminophen] Nausea and Vomiting     Cats      Throat tightness     Penicillins Hives     Seasonal Allergies      rhinitis     Shrimp      Throat closes      No current outpatient prescriptions on file.     EXAMINATION:   Vital Signs: /74 (BP Location: Right arm)  Pulse 84  Temp 97.7  F (36.5  C) (Oral)  Resp 16  Wt 87.6 kg (193 lb 2 oz)  SpO2 98%  BMI 28.52 kg/m2   GENERAL:  well-developed, well-nourished, in bed in no acute distress.   HEENT:  Head is normocephalic, atraumatic   EYES:  Eyes have anicteric sclerae without conjunctival injection or stigmata of endocarditis.    ENT:  Oropharynx is moist without exudates or ulcers. Tongue is midline  NECK:  Supple. No  Cervical lymphadenopathy  LUNGS:  Bibasilar crackles, otherwise clear breath sounds, no wheezing  CARDIOVASCULAR:  Regular rate and rhythm no c/g/r/m  ABDOMEN:  Normal bowel sounds, soft, nontender to palpation. No  appreciable hepatosplenomegaly  SKIN:  No acute rashes.  PD catheter site in place without any surrounding erythema or exudate.  NEUROLOGIC:  Grossly nonfocal. Active x4 extremities    NEW DATA/RESULTS:   Culture Micro   Date Value Ref Range Status   01/09/2018 Heavy growth  Normal tiffany    Final   01/07/2018 On day 4, isolated in broth only:  Yeast   (A)  Preliminary   01/07/2018 (A)  Preliminary    On day 4, isolated in broth only:  Gram negative rods     01/07/2018   Preliminary    Critical Value/Significant Value, preliminary result only, called to and read back by  Alverto Vasquez RN 7C 1.11.18 @0907 AV     01/07/2018 No growth after 4 days  Preliminary       Recent Labs   Lab Test  07/05/17   1204  05/31/17   1111  05/17/17   1214  04/13/17   0651  04/12/17   0935  04/11/17   1319   CRP  35.4*  15.9*  39.3*  270.0*  200.0*  130.0*     Recent Labs   Lab Test  01/10/18   1015  01/09/18   0802  01/08/18   0815  01/07/18   1655  11/14/17   0645  11/13/17   1709   WBC  3.3*  3.1*  4.4  6.3  10.1  9.6     Recent Labs   Lab Test  01/10/18   1015  01/09/18   0802  01/08/18   0815  01/07/18   1655   CR  8.48*  7.38*  6.91*  7.10*   GFRESTIMATED  6*  8*  8*  8*       Hematology Studies  Recent Labs   Lab Test  01/10/18   1015  01/09/18   0802  01/08/18   0815  01/07/18   1655  11/14/17   0645  11/13/17   1709   07/05/17   1204  06/26/17   0140   WBC  3.3*  3.1*  4.4  6.3  10.1  9.6   < >  11.9*  8.3   ANEU  1.9   --   3.3  5.3   --   8.4*   --   10.9*  7.6   AEOS  0.3   --   0.2  0.1   --   0.1   --   0.0  0.1   HCT  31.3*  29.9*  28.9*  31.4*  29.8*  28.9*   < >  27.4*  30.2*   PLT  250  269  256  277  234  277   < >  267  203    < > = values in this interval not displayed.       Metabolic  Recent Labs   Lab Test  01/10/18   1015  01/09/18   0802  01/08/18   0815   NA  138  135  138   BUN  39*  41*  42*   CO2  31  28  29   CR  8.48*  7.38*  6.91*   GFRESTIMATED  6*  8*  8*       Hepatic Studies  Recent Labs   Lab Test   01/07/18   1655  11/13/17   1709  08/02/17   1311  07/05/17   1204   BILITOTAL  0.4  0.8   --   0.5   ALKPHOS  76  104   --   98   ALBUMIN  1.8*  2.3*  3.2*  2.7*   AST  32  22   --   13   ALT  24  26   --   15       Immunologlobulins  Recent Labs   Lab Test  01/13/16   1337  05/19/15   1000   IGG   --   1380   IGM   --   108   IGA   --   203   SED  80*   --        Ascitic Fluid  1/7: WBC 4733 (80% PMN) -> yeast and GNR in broth only  1/9:  (57% PMN)  1/11:

## 2018-01-11 NOTE — PROVIDER NOTIFICATION
Notified MD at 2:20 AM regarding hyperglycemia. 0200 BG was 437 with immediate recheck of .     Spoke with: Jose Mackay MD #[id number]    Orders were not obtained.    Comments: Continue to monitor. BG recheck for 0400.

## 2018-01-11 NOTE — PROGRESS NOTES
Care Coordinator Progress Note     Admission Date/Time:  1/7/2018  Attending MD:  Breanne Miller MD     Data  Chart reviewed, discussed with interdisciplinary team.   Patient was admitted for: SBP (spontaneous bacterial peritonitis) (H).    Concerns with insurance coverage for discharge needs: None.  Current Living Situation: Patient lives alone.  Support System: Supportive  Services Involved: Home PD- [facility] [phone]  Transportation: Family or Friend will provide (sons)  Barriers to Discharge: Medical clearance/Antibiotic plan    Coordination of Care and Referrals: Provided patient/family with options for Home Infusion.        Assessment    Murray Nicholson is a 63 year old male with history of T2DM, MGUS, HLD, HTN, ESRD (likely 2/2 DM,HTN) on PD, HFrEF (EF - 20-25% with severe global hypokinesis), anemia of chronic disease and gout on allopurinol and prednisone who is admitted for further treatment of bacterial peritonitis that has failed outpatient treatment. Patient hemodynamically stable.     I met with the patient at the bedside to introduce the role of the care coordinator and to assess for discharge needs. This patient does home PD through [facility] ([phone]) but otherwise lives independently in the community and continues to work and drive prior to admission.     As the patient has failed intraperitoneal treatment of his bacterial peritonitis, I addressed some basic questions regarding home infusion. The patient is aware that the antibiotic plan is pending and that his team will keep him updated. The patient gave permission for me to check insurance coverage for home infusion if it is ultimately recommended.    The patient had no preference of agency thus a benefit check was initiated with [facility]  Phone # [phone] Fax # [phone].    Update 15:00- Per Ana Luisa at [facility], this patient has home IV coverage with $500 deductible (none met), 90/10 coverage with a $3000 OOP  max (none met prior to hospitalization).     The patient has no other discharge needs anticipated at this time, RN CC will continue to follow and assist as needed.     Plan  Anticipated Discharge Date:  TBD pending me  Anticipated Discharge Plan:  Anticipate home with resumption of home PD vs home with IV Antibiotics    Katt García, RN CC for Carrie Alexa, Medicine Care Coordinator    Ph: [phone]

## 2018-01-12 ENCOUNTER — APPOINTMENT (OUTPATIENT)
Dept: CT IMAGING | Facility: CLINIC | Age: 63
DRG: 981 | End: 2018-01-12
Payer: COMMERCIAL

## 2018-01-12 LAB
ANION GAP SERPL CALCULATED.3IONS-SCNC: 7 MMOL/L (ref 3–14)
APPEARANCE FLD: NORMAL
BUN SERPL-MCNC: 38 MG/DL (ref 7–30)
CALCIUM SERPL-MCNC: 8.4 MG/DL (ref 8.5–10.1)
CHLORIDE SERPL-SCNC: 98 MMOL/L (ref 94–109)
CO2 SERPL-SCNC: 32 MMOL/L (ref 20–32)
COLOR FLD: YELLOW
CREAT SERPL-MCNC: 8.27 MG/DL (ref 0.66–1.25)
EOSINOPHIL NFR FLD MANUAL: 2 %
ERYTHROCYTE [DISTWIDTH] IN BLOOD BY AUTOMATED COUNT: 16.9 % (ref 10–15)
FLUAV H1 2009 PAND RNA SPEC QL NAA+PROBE: NEGATIVE
FLUAV H1 RNA SPEC QL NAA+PROBE: NEGATIVE
FLUAV H3 RNA SPEC QL NAA+PROBE: POSITIVE
FLUAV RNA SPEC QL NAA+PROBE: POSITIVE
FLUBV RNA SPEC QL NAA+PROBE: NEGATIVE
GFR SERPL CREATININE-BSD FRML MDRD: 7 ML/MIN/1.7M2
GLUCOSE BLDC GLUCOMTR-MCNC: 144 MG/DL (ref 70–99)
GLUCOSE BLDC GLUCOMTR-MCNC: 146 MG/DL (ref 70–99)
GLUCOSE BLDC GLUCOMTR-MCNC: 181 MG/DL (ref 70–99)
GLUCOSE BLDC GLUCOMTR-MCNC: 241 MG/DL (ref 70–99)
GLUCOSE BLDC GLUCOMTR-MCNC: 291 MG/DL (ref 70–99)
GLUCOSE BLDC GLUCOMTR-MCNC: 335 MG/DL (ref 70–99)
GLUCOSE SERPL-MCNC: 250 MG/DL (ref 70–99)
HADV DNA SPEC QL NAA+PROBE: NEGATIVE
HADV DNA SPEC QL NAA+PROBE: NEGATIVE
HCT VFR BLD AUTO: 32 % (ref 40–53)
HGB BLD-MCNC: 9.8 G/DL (ref 13.3–17.7)
HMPV RNA SPEC QL NAA+PROBE: NEGATIVE
HPIV1 RNA SPEC QL NAA+PROBE: NEGATIVE
HPIV2 RNA SPEC QL NAA+PROBE: NEGATIVE
HPIV3 RNA SPEC QL NAA+PROBE: NEGATIVE
LYMPHOCYTES NFR FLD MANUAL: 1 %
MAGNESIUM SERPL-MCNC: 1.7 MG/DL (ref 1.6–2.3)
MCH RBC QN AUTO: 30.4 PG (ref 26.5–33)
MCHC RBC AUTO-ENTMCNC: 30.6 G/DL (ref 31.5–36.5)
MCV RBC AUTO: 99 FL (ref 78–100)
MICROBIOLOGIST REVIEW: ABNORMAL
MONOS+MACROS NFR FLD MANUAL: 7 %
NEUTS BAND NFR FLD MANUAL: 90 %
PHOSPHATE SERPL-MCNC: 3.3 MG/DL (ref 2.5–4.5)
PLATELET # BLD AUTO: 323 10E9/L (ref 150–450)
POTASSIUM SERPL-SCNC: 3.5 MMOL/L (ref 3.4–5.3)
RBC # BLD AUTO: 3.22 10E12/L (ref 4.4–5.9)
RHINOVIRUS RNA SPEC QL NAA+PROBE: NEGATIVE
RSV RNA SPEC QL NAA+PROBE: NEGATIVE
RSV RNA SPEC QL NAA+PROBE: NEGATIVE
SODIUM SERPL-SCNC: 137 MMOL/L (ref 133–144)
SPECIMEN SOURCE FLD: NORMAL
SPECIMEN SOURCE: ABNORMAL
VANCOMYCIN SERPL-MCNC: 32.2 MG/L
VANCOMYCIN SERPL-MCNC: 37.3 MG/L
WBC # BLD AUTO: 4.1 10E9/L (ref 4–11)
WBC # FLD AUTO: 874 /UL

## 2018-01-12 PROCEDURE — 40000095 ZZH STATISTIC LEVEL 2 EST PATIENT

## 2018-01-12 PROCEDURE — 25000128 H RX IP 250 OP 636: Performed by: INTERNAL MEDICINE

## 2018-01-12 PROCEDURE — 84100 ASSAY OF PHOSPHORUS: CPT | Performed by: INTERNAL MEDICINE

## 2018-01-12 PROCEDURE — 80048 BASIC METABOLIC PNL TOTAL CA: CPT | Performed by: INTERNAL MEDICINE

## 2018-01-12 PROCEDURE — 12000008 ZZH R&B INTERMEDIATE UMMC

## 2018-01-12 PROCEDURE — 27210995 ZZH RX 272: Performed by: INTERNAL MEDICINE

## 2018-01-12 PROCEDURE — 25000131 ZZH RX MED GY IP 250 OP 636 PS 637: Performed by: INTERNAL MEDICINE

## 2018-01-12 PROCEDURE — 40000556 ZZH STATISTIC PERIPHERAL IV START W US GUIDANCE

## 2018-01-12 PROCEDURE — 80202 ASSAY OF VANCOMYCIN: CPT | Performed by: INTERNAL MEDICINE

## 2018-01-12 PROCEDURE — 3E1M39Z IRRIGATION OF PERITONEAL CAVITY USING DIALYSATE, PERCUTANEOUS APPROACH: ICD-10-PCS | Performed by: INTERNAL MEDICINE

## 2018-01-12 PROCEDURE — 25000132 ZZH RX MED GY IP 250 OP 250 PS 637: Performed by: HOSPITALIST

## 2018-01-12 PROCEDURE — 99233 SBSQ HOSP IP/OBS HIGH 50: CPT | Mod: GC | Performed by: INTERNAL MEDICINE

## 2018-01-12 PROCEDURE — 25000132 ZZH RX MED GY IP 250 OP 250 PS 637: Performed by: INTERNAL MEDICINE

## 2018-01-12 PROCEDURE — 25000132 ZZH RX MED GY IP 250 OP 250 PS 637: Performed by: STUDENT IN AN ORGANIZED HEALTH CARE EDUCATION/TRAINING PROGRAM

## 2018-01-12 PROCEDURE — 36415 COLL VENOUS BLD VENIPUNCTURE: CPT | Performed by: INTERNAL MEDICINE

## 2018-01-12 PROCEDURE — 74176 CT ABD & PELVIS W/O CONTRAST: CPT

## 2018-01-12 PROCEDURE — 85027 COMPLETE CBC AUTOMATED: CPT | Performed by: INTERNAL MEDICINE

## 2018-01-12 PROCEDURE — 00000146 ZZHCL STATISTIC GLUCOSE BY METER IP

## 2018-01-12 PROCEDURE — 83735 ASSAY OF MAGNESIUM: CPT | Performed by: INTERNAL MEDICINE

## 2018-01-12 RX ORDER — OSELTAMIVIR PHOSPHATE 75 MG/1
75 CAPSULE ORAL 2 TIMES DAILY
Status: DISCONTINUED | OUTPATIENT
Start: 2018-01-12 | End: 2018-01-15

## 2018-01-12 RX ORDER — POTASSIUM CHLORIDE 750 MG/1
40 TABLET, EXTENDED RELEASE ORAL ONCE
Status: COMPLETED | OUTPATIENT
Start: 2018-01-12 | End: 2018-01-12

## 2018-01-12 RX ORDER — OSELTAMIVIR PHOSPHATE 75 MG/1
75 CAPSULE ORAL 2 TIMES DAILY
Status: DISCONTINUED | OUTPATIENT
Start: 2018-01-12 | End: 2018-01-12

## 2018-01-12 RX ADMIN — INSULIN ASPART 1 UNITS: 100 INJECTION, SOLUTION INTRAVENOUS; SUBCUTANEOUS at 18:40

## 2018-01-12 RX ADMIN — CALCIUM ACETATE 667 MG: 667 CAPSULE ORAL at 09:04

## 2018-01-12 RX ADMIN — PREDNISONE 5 MG: 2.5 TABLET ORAL at 09:04

## 2018-01-12 RX ADMIN — ATORVASTATIN CALCIUM 40 MG: 40 TABLET, FILM COATED ORAL at 20:57

## 2018-01-12 RX ADMIN — CALCITRIOL 0.25 MCG: 0.25 CAPSULE, LIQUID FILLED ORAL at 09:03

## 2018-01-12 RX ADMIN — OSELTAMIVIR PHOSPHATE 75 MG: 75 CAPSULE ORAL at 20:57

## 2018-01-12 RX ADMIN — FLUTICASONE PROPIONATE 2 SPRAY: 50 SPRAY, METERED NASAL at 09:13

## 2018-01-12 RX ADMIN — HYDRALAZINE HYDROCHLORIDE 25 MG: 25 TABLET ORAL at 09:04

## 2018-01-12 RX ADMIN — METOPROLOL SUCCINATE 100 MG: 100 TABLET, EXTENDED RELEASE ORAL at 09:04

## 2018-01-12 RX ADMIN — GENTAMICIN SULFATE 180 MG: 40 INJECTION, SOLUTION INTRAMUSCULAR; INTRAVENOUS at 19:19

## 2018-01-12 RX ADMIN — ACETAMINOPHEN 650 MG: 325 TABLET ORAL at 02:29

## 2018-01-12 RX ADMIN — BENZONATATE 100 MG: 100 CAPSULE, LIQUID FILLED ORAL at 18:40

## 2018-01-12 RX ADMIN — CALCIUM ACETATE 667 MG: 667 CAPSULE ORAL at 18:40

## 2018-01-12 RX ADMIN — POTASSIUM CHLORIDE 40 MEQ: 750 TABLET, EXTENDED RELEASE ORAL at 12:15

## 2018-01-12 RX ADMIN — ASPIRIN 81 MG CHEWABLE TABLET 81 MG: 81 TABLET CHEWABLE at 09:05

## 2018-01-12 RX ADMIN — HYDRALAZINE HYDROCHLORIDE 25 MG: 25 TABLET ORAL at 20:57

## 2018-01-12 RX ADMIN — TORSEMIDE 100 MG: 100 TABLET ORAL at 16:26

## 2018-01-12 RX ADMIN — SPIRONOLACTONE 50 MG: 25 TABLET ORAL at 09:03

## 2018-01-12 RX ADMIN — ALLOPURINOL 400 MG: 300 TABLET ORAL at 09:03

## 2018-01-12 RX ADMIN — SODIUM CHLORIDE, SODIUM LACTATE, CALCIUM CHLORIDE, MAGNESIUM CHLORIDE AND DEXTROSE: 2.5; 538; 448; 18.3; 5.08 INJECTION, SOLUTION INTRAPERITONEAL at 17:27

## 2018-01-12 RX ADMIN — ACETAMINOPHEN 650 MG: 325 TABLET ORAL at 20:57

## 2018-01-12 RX ADMIN — TORSEMIDE 100 MG: 100 TABLET ORAL at 09:04

## 2018-01-12 RX ADMIN — INSULIN ASPART 5 UNITS: 100 INJECTION, SOLUTION INTRAVENOUS; SUBCUTANEOUS at 09:02

## 2018-01-12 RX ADMIN — LOSARTAN POTASSIUM 100 MG: 50 TABLET ORAL at 09:03

## 2018-01-12 RX ADMIN — INSULIN ASPART 1 UNITS: 100 INJECTION, SOLUTION INTRAVENOUS; SUBCUTANEOUS at 12:16

## 2018-01-12 RX ADMIN — TRAMADOL HYDROCHLORIDE 50 MG: 50 TABLET, COATED ORAL at 20:57

## 2018-01-12 RX ADMIN — SODIUM CHLORIDE, SODIUM LACTATE, CALCIUM CHLORIDE, MAGNESIUM CHLORIDE AND DEXTROSE: 4.25; 538; 448; 18.3; 5.08 INJECTION, SOLUTION INTRAPERITONEAL at 17:25

## 2018-01-12 RX ADMIN — AMLODIPINE BESYLATE 5 MG: 5 TABLET ORAL at 09:03

## 2018-01-12 RX ADMIN — BENZONATATE 100 MG: 100 CAPSULE, LIQUID FILLED ORAL at 22:28

## 2018-01-12 RX ADMIN — CEFTAZIDIME: 1 INJECTION, POWDER, FOR SOLUTION INTRAMUSCULAR; INTRAVENOUS at 17:27

## 2018-01-12 RX ADMIN — HYDRALAZINE HYDROCHLORIDE 25 MG: 25 TABLET ORAL at 16:26

## 2018-01-12 RX ADMIN — OMEPRAZOLE 20 MG: 20 CAPSULE, DELAYED RELEASE ORAL at 09:04

## 2018-01-12 ASSESSMENT — PAIN DESCRIPTION - DESCRIPTORS
DESCRIPTORS: ACHING
DESCRIPTORS: SORE
DESCRIPTORS: TENDER

## 2018-01-12 NOTE — PROGRESS NOTES
[facility], [place]    Internal Medicine Progress Note - [facility] Service    Main Plans for Today   Will touch base with ID and nephrology today about his increasing WBC in peritoneal fluid  Will consider CT abdomen later today after discussion with ID and nephrology     Assessment & Plan   Murray Nicholson is a 63 year old male with history of T2DM, MGUS, HLD, HTN, ESRD (likely 2/2 DM,HTN) on PD, HFrEF (EF - 20-25% with severe global hypokinesis), anemia of chronic disease and gout on allopurinol and prednisone who is admitted for further treatment of bacterial peritonitis that has failed outpatient treatment. Patient hemodynamically stable.      # Bacterial peritonitis s/p treatment failure (9 day intraperitoneal course of Ceftazidime).  # ESRD 2/2 T2Dm, HTN - on PD  Patient with abdominal pain, cloudy dialysate and s/p 9 day course of intraperitoneal ceftazidime for suspected peritonitis. Cultures were done at [facility]. Per Nephrology note, peritoneal fluid notable for E.Coli,  Pseudomonas and Streptococcus. Patient presented to ED due to worsening abdominal symptoms. Fluid studies shows 4733 WBCs on peritoneal dialysate with 80% neutrophils on 1/7/2018. Lactate WNL. No fevers or leukocytosis. Outside culture with e coli, pseudomonas and streptococcus (in hard chart).  Symptoms and vitals are stable. CT abdomen 1/8/18 with mildly enlarged appendix, but surgery does not think it is appendicitis. WBC count in peritoneal fluid was down trending but now rising again and PD fluid looks more cloudy.  - Surgery consulted, appreciate recs- signed off  - ID consulted, appreciate recs  - Patient will continue on ceftazidime and vancomycin, will discuss with nephrology today about IV vs IP  - Repeat peritoneal fluid cell count w/ diff, culture, and gram stain   - Monitor vital signs closely  - Pleural fluid from 1/7 growing gram neg rods and yeast on day 4 of culture ; ID feels that yeast is likely a  contaminant  - Nephrology consulted, appreciate recs  - Continue PTA calcitriol, and phoslo  - BC NGTD    # Worsening Cough & Congestion  CXR on admission did not show any focal findings or effusion. Cough has worsened in the last day. Sputum stain showed normal tiffany. Suspicious for viral process vs pulmonary edema. CXR on 1/11 showed right LL opacity which is consistent with exam finding. I'm suspicious this is a viral pneumonia vs bacterial given that he has been on abx that should cover typical bacterial pathogens.   - Continue flonase  - Tessalon perles  - RVP pending     # Hypokalemia  - Replace PRN    # Mild Leukopenia- Resolved   Likely due to acute infection. Now trending up.   - Monitor     # T2DM  Last A1C - 6.4 in 6/2017, but 9.1 on admission. On Glipizide PTA and no insulin. BG has been elevated this admission likely due to acute infection, but based on his A1c his BG hasn't been well controlled in recent months.  - Will consider starting him on lantus this admission   - Hold Glipizide due to hypoglycemia risk. Will resume at discharge  - High dose SSI  - Glucose checks  - Hypoglycemia protocol     #HFrEF (EF 20-25% on 4/2017 TTE) - Stage C, NYHA class III  #CAD  #HTN  #HLD  #Ascending aortic aneurysm  No cardiorespiratory symptoms on presentation. On Losartan, Toprol and ASA. Last Echo 4/2017 with  coronary angiogram yet to be done, although ordered. Ascending aortic aneurysm 4.8cm as at last check 8/2/2017.      - Continue PTA ASA 81mg daily  - Continue PTA Losartan 100mg daily  - Continue  PTA Toprol XL 100mg daily  - Continue PTA Torsemide 100mg BID (was on bumex but currently on torsemide which is a recent change)  - Continue PTA Atorvastatin 40mg daily  - Continue PTA Amlodipine 5 mg daily  - CORE clinic referral on d/c  - Continue  PTA spironolactone 50mg daily   - Restart PTA Hydralazine at a lower dose 25mg TID (50mg TID at home)  - Hold Imdur- restart tomorrow      CHRONIC PROBLEMS  # Gout.  Stable. Continue Allopurinol, prednisone (patietn states he gets frequent flares)  # MGUS. Stable.  # Anemia of chronic disease: Stable    # GERD. Stable. On Omeprazole      Medications held:  Imdur  Glipizide    Diet: Combination Diet Renal Diet; 8791-3958 Calories: Moderate Consistent CHO (4-6 CHO units/meal)  Fluids: none  DVT Prophylaxis: Pneumatic Compression Devices, ambulate   Code Status: Full Code    Disposition Plan   Expected discharge: TBD, recommended to prior living arrangement once adequate pain management/ tolerating PO medications and antibiotic plan established.     Entered: Mateo Singh 01/12/2018, 10:36 AM   Information in the above section will display in the discharge planner report.      The patient's care was discussed with the Attending Physician, Dr. JONES and Bedside Nurse.    Mateo Singh MD, MPH  Medicine-Pediatrics PGY4  [phone]    Please see sticky note for cross cover information    Interval History   Patient had more pain with PD run last night. No fevers or chills. PD fluid looks more cloudy and white count in fluid is up. His cough is overall unchanged . He is eating and drinking okay. Had a large bowel movement with mag citrate.     24 hours notes reviewed.     Physical Exam   Vital Signs: Temp: 97.8  F (36.6  C) Temp src: Oral BP: 119/71 Pulse: 85   Resp: 16 SpO2: 98 % O2 Device: None (Room air)    Weight: 192 lbs 14.4 oz  General Appearance: Well appearing, no distress, walking around room, good energy   Respiratory: crackles at right base, moving air well otherwise, no wheezing  Cardiovascular: RRR, no m/r/g  GI: soft, non-tender to palpation, no guarding, no rebound, active bowel sounds   Skin: no rashes or concerning lesions, no jaundice  Neuro: AOx4, symmetric strength   Ext: no LE edema      Data   Medications     peritoneal dialysis solutions ADULT       peritoneal dialysis solutions ADULT       peritoneal dialysis solutions ADULT         insulin aspart  1-10 Units  Subcutaneous TID AC     insulin aspart  1-7 Units Subcutaneous At Bedtime     allopurinol  400 mg Oral Daily     hydrALAZINE  25 mg Oral TID     torsemide  100 mg Oral BID     predniSONE  5 mg Oral Daily     senna-docusate  1 tablet Oral BID     spironolactone  50 mg Oral Daily     amLODIPine  5 mg Oral Daily     aspirin  81 mg Oral Daily     atorvastatin  40 mg Oral Daily     fluticasone  1-2 spray Both Nostrils Daily     calcitRIOL  0.25 mcg Oral Daily     calcium acetate  667 mg Oral TID w/meals     omeprazole  20 mg Oral Daily     losartan  100 mg Oral Daily     metoprolol succinate  100 mg Oral Daily     Data     Recent Labs  Lab 01/12/18  0742 01/11/18  0945 01/10/18  1015  01/07/18  1655   WBC 4.1 3.9* 3.3*  < > 6.3   HGB 9.8* 9.5* 9.6*  < > 10.0*   MCV 99 99 99  < > 99    307 250  < > 277    140 138  < > 137   POTASSIUM 3.5 3.5 3.1*  < > 3.7   CHLORIDE 98 100 98  < > 97   CO2 32 32 31  < > 28   BUN 38* 40* 39*  < > 42*   CR 8.27* 8.66* 8.48*  < > 7.10*   ANIONGAP 7 8 8  < > 12   TRINI 8.4* 8.0* 7.8*  < > 8.2*   * 195* 120*  < > 171*   ALBUMIN  --   --   --   --  1.8*   PROTTOTAL  --   --   --   --  6.0*   BILITOTAL  --   --   --   --  0.4   ALKPHOS  --   --   --   --  76   ALT  --   --   --   --  24   AST  --   --   --   --  32   < > = values in this interval not displayed.

## 2018-01-12 NOTE — PROGRESS NOTES
"PERITONEAL DIALYSIS CYCLER TREATMENT NOTE      Date:  1/12/2018  Time:  5:40 PM    Data:   Treatment Ultrafiltrate Volume mL:  -272    Total Duration of Therapy: 8 hours   Fill Volume: 2000 mL  Heater Bag: Volume 6L and Dextrose Concentration 4.25% Ca 2.5  Side Bag(s): Volume 6/2 L  and Dextrose Concentration 2.5%, 1.5% w/Ceftazidime with last fill  Total Number of Cycles: 4 + last fill   Post Weight after Last Fill: no weight taken today    Assessment:   PD Patient Response:  Tolerating well. Dressing CDI, catheter secured.     Interventions:   cycler set with above prescription. \"Just in time\" training done with floor nurse on collecting cell count. Hand-off report given to primary RN.    Plan:     Next tx per renal.  "

## 2018-01-12 NOTE — PROGRESS NOTES
Nephrology Progress Note  01/12/2018         Assessment & Recommendations:   Murray Nicholson is a 63 year old male with a PMH of ESRD sec to likely DMII and HTN , CAD , HFrEF 20-25%, HLD, MGUS, SHEELA , AAA, Gout on prednisone and allopurinol, GERD, admitted with peritonitis diagnosed 12/29 outpatient and was treated with ceftazidime 1500mg intraperitoneal, presents with cloudy PD fluid , fatigue and abdominal pain.  Nephrology following for ESRD mangement and peritoneal infection.    Peritonitis with polymicrobial infection  Repeat cytology and Cx sent on 1/7. Cx GNR, Yeast (yeast, most likely contamination per ID). Blood Cx NGTD.  Grossly cloudy PD fluid on admission cell counts >4000 ->decreased to 457->109->874 with 90% neutrophils on 1/12. Peritoneal fluid cloudy again today, abdominal pain and tenderness got worsen overnight to 7-8/10  Has been afebrile for more than 72 hours. Vancomycin level 32 this am, yesterday it was 21 (Pt got IP vancomycin overnight). Would add gentamicin. If still fails, will consider PD catheter removal.  - Recheck cell count from peritoneal fluid  - Repeat peritoneal fluid culture  - Repeat abd CT   - Add gentamicin iv  - Continue intraperitoneal ceftazidime 1,500mg q24h and vancomycin 1.5mg/kg  - Monitor vancomycin level  - Daily cell count from peritoneal fluid   - renal panel daily with daily weights      ESRD on PD since 06/2017  [facility] under Dr Mcpherson's care  PD prescription: 8hrs , 4 cycles , FV 2000, 2hr dwell , dextrose variable  UF on average 300-900  EDW 86Kg (190lbs)  Kt/V 1.5 PTA  Pt seems to be still volume overloaded  - PD today :8hrs, x4 cycles 2hr dwell with 4.25% and 2.5%       Mild hypervolemia  Will follow with PD, 4.25% and 2.5% dextrose for today  UF -272 last night (however, fluid with Abx still in peritoneal cavity)  Bilateral edema improving      Electrolytes  Hypokalemia  Repleted with potassium 40meq on 1/10. K 3.5 today      MBD  Phos 7.2, PTH  229(10/17)  Calcium 7.8, albumin 1.8  On phoslo and Vit D      Anemia  Hb 9.2  On EPO 77806 units Qmonthly  Transfuse if <7  Iron replete per PTA labs      Recommendations were communicated to primary team via phone.      Staffed with Dr. Otoole.  Betty Coffey MD PhD  Internal Medicine PGY-1   [phone]    Interval History :   In the last 24 hours Murray Nicholson stated that he had abdominal pain last night. It was up to 7-8/10. Also noticed cloudy peritoneal fluid. No fever. Slight chills. Cough worsens at night. No runny nose.    Review of Systems:   I reviewed the following systems:  GI: Good appetite. - nausea or vomiting or diarrhea.   Neuro:  -confusion  Constitutional:  -fever +chills  CV: -dyspnea +edema.  - chest pain.    Physical Exam:   I/O last 3 completed shifts:  In: 240 [P.O.:240]  Out: -    /71 (BP Location: Right arm)  Pulse 85  Temp 97.8  F (36.6  C) (Oral)  Resp 16  Wt 87.5 kg (192 lb 14.4 oz)  SpO2 98%  BMI 28.49 kg/m2     GENERAL APPEARANCE: NAD  EYES:  No scleral icterus, pupils equal  HENT: mouth without ulcers or lesions  PULM: lungs clear to auscultation,  bilaterally, equal air movement, no clubbing  CV: regular rhythm, normal rate, no rub     -JVD -     -edema bilateral slight+  GI: soft, diffuse tender, slightly distended, bowel sounds are present  INTEGUMENT: no cyanosis, no rash  NEURO:  no asterixis      Labs:   All labs reviewed by me  Electrolytes/Renal -   Recent Labs   Lab Test  01/12/18   0742  01/11/18   0945  01/10/18   1015   11/21/17   1239  11/15/17   0758  11/14/17   0645   NA  137  140  138   < >  142  144  141   POTASSIUM  3.5  3.5  3.1*   < >  4.6  3.2*  3.7   CHLORIDE  98  100  98   < >  102  103  103   CO2  32  32  31   < >  29  30  27   BUN  38*  40*  39*   < >  66*  69*  76*   CR  8.27*  8.66*  8.48*   < >  7.65*  6.98*  6.85*   GLC  250*  195*  120*   < >  189*  294*  312*   TRINI  8.4*  8.0*  7.8*   < >  8.9  8.9  8.9   MAG  1.7   --    --    --   1.9   1.9  1.8   PHOS  3.3   --    --    --    --   5.0*  4.3    < > = values in this interval not displayed.       CBC -   Recent Labs   Lab Test  01/12/18   0742  01/11/18   0945  01/10/18   1015   WBC  4.1  3.9*  3.3*   HGB  9.8*  9.5*  9.6*   PLT  323  307  250       LFTs -   Recent Labs   Lab Test  01/07/18   1655  11/13/17   1709  08/02/17   1311  07/05/17   1204   ALKPHOS  76  104   --   98   BILITOTAL  0.4  0.8   --   0.5   ALT  24  26   --   15   AST  32  22   --   13   PROTTOTAL  6.0*  6.0*   --   6.2*   ALBUMIN  1.8*  2.3*  3.2*  2.7*       Iron Panel -   Recent Labs   Lab Test  07/19/17   1306  07/05/17   1204  06/21/17   1058   IRON  46  26*  69   IRONSAT  18  12*  29   ALBERTINA  369  542*  557*       Current Medications:    insulin aspart  1-10 Units Subcutaneous TID AC     insulin aspart  1-7 Units Subcutaneous At Bedtime     allopurinol  400 mg Oral Daily     hydrALAZINE  25 mg Oral TID     torsemide  100 mg Oral BID     predniSONE  5 mg Oral Daily     senna-docusate  1 tablet Oral BID     spironolactone  50 mg Oral Daily     amLODIPine  5 mg Oral Daily     aspirin  81 mg Oral Daily     atorvastatin  40 mg Oral Daily     fluticasone  1-2 spray Both Nostrils Daily     calcitRIOL  0.25 mcg Oral Daily     calcium acetate  667 mg Oral TID w/meals     omeprazole  20 mg Oral Daily     losartan  100 mg Oral Daily     metoprolol succinate  100 mg Oral Daily       peritoneal dialysis solutions ADULT       peritoneal dialysis solutions ADULT       peritoneal dialysis solutions ADULT       Betty Coffey MD

## 2018-01-12 NOTE — PROGRESS NOTES
PERITONEAL DIALYSIS CYCLER TREATMENT NOTE      Date:  1/11/2018  Time:  6:13 PM    Data:   Treatment Ultrafiltrate Volume mL:     Total Duration of Therapy: 8hrs   Fill Volume: 2000 mL  Heater Bag: Volume 6000 and Dextrose Concentration2.5Side Bag(s): [id number] and Dextrose Concentration4.25 Post Weight after Last Fill: kg     Assessment:   PD Patient Response:  Tolerating well. 2000 1.5 with abxs for final dwell . 1900 dextrose changed to not the same. Patient will collect sample. Patient and RN given PD pager number for any questions.     Interventions:   Comments:   Patient comfortable putting self on later this evening. RN will check on patient for any additional needs     Plan:     Per renal     Attestation meant for another note

## 2018-01-12 NOTE — PHARMACY-VANCOMYCIN DOSING SERVICE
Pharmacy Vancomycin Note  Date of Service 2018  Patient's  1955   63 year old, male    Indication: Peritonitis  Goal Trough Level: 15-20 mg/L  Day of Therapy: 6  Current Vancomycin regimen: Intermittent dosing, then 15 mg/kg IP x1    Current estimated CrCl = CrCl cannot be calculated (Unknown ideal weight.).    Creatinine for last 3 days  1/10/2018: 10:15 AM Creatinine 8.48 mg/dL  2018:  9:45 AM Creatinine 8.66 mg/dL  2018:  7:42 AM Creatinine 8.27 mg/dL    Recent Vancomycin Levels (past 3 days)  1/10/2018: 10:15 AM Vancomycin Level 21.7 mg/L  2018:  7:42 AM Vancomycin Level 32.2 mg/L    Vancomycin IV Administrations (past 72 hours)                   dianeal PD LOW calcium-1.5% dex (calcium 2.5 mEq/L) 2,000 mL with cefTAZidime 1.5 g, vancomycin 15 mg/kg = 1,314 mg PERITONEAL DIALYSATE ()  New Bag 18 1737                Nephrotoxins and other renal medications (Future)    Start     Dose/Rate Route Frequency Ordered Stop    18 1553  dianeal PD LOW calcium-1.5% dex (calcium 2.5 mEq/L) 2,000 mL with cefTAZidime 1.5 g, vancomycin 15 mg/kg = 1,314 mg PERITONEAL DIALYSATE       Dialysis PERITONEAL DIALYSIS 18 1554      01/10/18 1600  torsemide (DEMADEX) tablet 100 mg      100 mg Oral 2 TIMES DAILY. 01/10/18 1157               Contrast Orders - past 72 hours     None          Interpretation of levels and current regimen:  Trough level is  Supratherapeutic    Has serum creatinine changed > 50% in last 72 hours: No    Urine output:  anuric    Renal Function: ESRD on PD    Assessment:  Patient was changed to vanco IP last night.  Vanco lvl was kept for this AM to calculate half life, however, patient received IP vanco last night and therefor are unable to calculate patient's clearance of vanco.  Guidelines recommend dosing vanco once and checking a vanco trough level in 2 days in an effort to keep trough level > 15 mg/L.  If trough level falls below 15 mg/L, guidelines  recommend redosing @ 30 mg/kg.  This has been strategy has been contradicted by a single center, observational study.    Guidelines:  http://www.pdiconnect.com/content/36/5/481.full.pdf  Observational Study:  http://www.[employer].com/content/35/2/222.full.pdf      Plan:  1.  IP antibiotics being managed by nephrology.  Spoke to fellow and they are getting a repeat vanco lvl today (1/12) and then will dose vanco based on the lvl.  2.  Pharmacy will check trough levels as appropriate in Today (1/12).    3. Serum creatinine levels will be ordered Not necessary to check SCr as patient is on PD.      Yared Hassan, PharmD

## 2018-01-12 NOTE — PHARMACY-VANCOMYCIN DOSING SERVICE
Pharmacy Vancomycin Note  Date of Service 2018  Patient's  1955   63 year old, male    Indication: Intra-abdominal infection  Goal Trough Level: 15-20 mg/L  Day of Therapy: 6  Current Vancomycin regimen:  Intermittent dosing, then IP x 1    Current estimated CrCl = CrCl cannot be calculated (Unknown ideal weight.).    Creatinine for last 3 days  1/10/2018: 10:15 AM Creatinine 8.48 mg/dL  2018:  9:45 AM Creatinine 8.66 mg/dL  2018:  7:42 AM Creatinine 8.27 mg/dL    Recent Vancomycin Levels (past 3 days)  1/10/2018: 10:15 AM Vancomycin Level 21.7 mg/L  2018:  7:42 AM Vancomycin Level 32.2 mg/L;  2:02 PM Vancomycin Level 37.3 mg/L    Vancomycin IV Administrations (past 72 hours)                   dianeal PD LOW calcium-1.5% dex (calcium 2.5 mEq/L) 2,000 mL with cefTAZidime 1.5 g, vancomycin 15 mg/kg = 1,314 mg PERITONEAL DIALYSATE ()  New Bag 18 1737                Nephrotoxins and other renal medications (Future)    Start     Dose/Rate Route Frequency Ordered Stop    18 1715  gentamicin (GARAMYCIN) 180 mg in NaCl 0.9 % 50 mL intermittent infusion      2 mg/kg × 87.5 kg  over 60 Minutes Intravenous ONCE 18 1659      01/10/18 1600  torsemide (DEMADEX) tablet 100 mg      100 mg Oral 2 TIMES DAILY. 01/10/18 1157               Contrast Orders - past 72 hours     None          Interpretation of levels and current regimen:  Trough level is  Supratherapeutic    Has serum creatinine changed > 50% in last 72 hours: No    Urine output:  unable to determine    Renal Function: ARF on Dialysis    Plan:  1.  Continue to hold vancomycin, recheck level   2.  Pharmacy will check trough levels as appropriate in 1-3 Days.    3. Serum creatinine levels will be ordered a minimum of twice weekly.      Ryley Domingo PharmD

## 2018-01-12 NOTE — PHARMACY
Prescriber Notification Note    The pharmacist has communicated with this patient's provider regarding a concern or therapy recommendation.    Notified Person: Dialysis Fellow  Date/Time of Notification: 1/12/2018 @ 1100  Interaction: phone  Concern/Recommendation: wanted to clarify scheduling of IP vancomycin and trough level evaluation.  She mentioned that patient should not have gotten IP vanco yesterday (1/11) and that she was surprised to see vanco lvl @ 31 mg/L today.  Ordered a repeat vanco lvl again this morning to verify that is a true vanco lvl.  They will then check levels every 2 -3 days and redose vanco when patient has a trough level < 20 mg/L.    Pedro Pablo Hassan, PharmD

## 2018-01-12 NOTE — PLAN OF CARE
Problem: Patient Care Overview  Goal: Plan of Care/Patient Progress Review  Outcome: No Change  AVSS, afebrile.  Up ad edith, wearing mask when in hallway.  Pt states he has had multiple loose BMs since taking Magnesium citrate yesterday.  Refused senna today.  RSV panel pending, contact and droplet precautions maintained.  PD tubing site covered, c/d/i.  PD fluid cloudy, MD and PD nurse aware, bag is at bedside per protocol.  BGs 241 and 141, treated using sliding scale insulin.  Vanco level remains high at 32.2, team aware.  Continue to monitor.

## 2018-01-12 NOTE — PLAN OF CARE
Problem: Patient Care Overview  Goal: Plan of Care/Patient Progress Review  Outcome: No Change  Assumed care [id number]. Pt A/O, VSS on RA. Denies pain or nausea, tolerating renal/low israel diet. Up ad edith, showered this evening. Pt reports passing small amt stool. , covered w/SS. Possible discharge on Saturday. Continue to monitor and w/POC.     Problem: Infection, Risk/Actual (Adult)  Goal: Identify Related Risk Factors and Signs and Symptoms  Related risk factors and signs and symptoms are identified upon initiation of Human Response Clinical Practice Guideline (CPG).   Outcome: No Change   01/11/18 4220   Infection, Risk/Actual   Related Risk Factors (Infection, Risk/Actual) chronic illness/condition;prolonged hospitalization   Signs and Symptoms (Infection, Risk/Actual) blood glucose changes;pain

## 2018-01-12 NOTE — PLAN OF CARE
Problem: Patient Care Overview  Goal: Plan of Care/Patient Progress Review  Outcome: No Change  AVSS. Shoulder and abdominal pain managed with Tylenol, Tramadol, heat and ice packs. Tessalon given x1 for cough. Tolerated a moderate CHO/renal diet. SSI given. 0200 BG elevated, MD notified. Patient passing gas and having bowel movements. Patient self managing peritoneal dialysis. On contact and droplet precautions as RSV panel is pending. Continue with plan of care.

## 2018-01-13 LAB
ANION GAP SERPL CALCULATED.3IONS-SCNC: 8 MMOL/L (ref 3–14)
APPEARANCE FLD: NORMAL
BACTERIA SPEC CULT: ABNORMAL
BACTERIA SPEC CULT: NO GROWTH
BACTERIA SPEC CULT: NO GROWTH
BASOPHILS NFR FLD MANUAL: 1 %
BUN SERPL-MCNC: 39 MG/DL (ref 7–30)
CALCIUM SERPL-MCNC: 8.6 MG/DL (ref 8.5–10.1)
CHLORIDE SERPL-SCNC: 97 MMOL/L (ref 94–109)
CO2 SERPL-SCNC: 32 MMOL/L (ref 20–32)
COLOR FLD: COLORLESS
CREAT SERPL-MCNC: 8.52 MG/DL (ref 0.66–1.25)
ERYTHROCYTE [DISTWIDTH] IN BLOOD BY AUTOMATED COUNT: 16.8 % (ref 10–15)
GENTAMICIN SERPL-MCNC: 5.2 MG/L
GFR SERPL CREATININE-BSD FRML MDRD: 6 ML/MIN/1.7M2
GLUCOSE BLDC GLUCOMTR-MCNC: 172 MG/DL (ref 70–99)
GLUCOSE BLDC GLUCOMTR-MCNC: 179 MG/DL (ref 70–99)
GLUCOSE BLDC GLUCOMTR-MCNC: 182 MG/DL (ref 70–99)
GLUCOSE BLDC GLUCOMTR-MCNC: 239 MG/DL (ref 70–99)
GLUCOSE BLDC GLUCOMTR-MCNC: 246 MG/DL (ref 70–99)
GLUCOSE BLDC GLUCOMTR-MCNC: 307 MG/DL (ref 70–99)
GLUCOSE SERPL-MCNC: 211 MG/DL (ref 70–99)
HCT VFR BLD AUTO: 31.9 % (ref 40–53)
HGB BLD-MCNC: 9.9 G/DL (ref 13.3–17.7)
MAGNESIUM SERPL-MCNC: 1.8 MG/DL (ref 1.6–2.3)
MCH RBC QN AUTO: 30.7 PG (ref 26.5–33)
MCHC RBC AUTO-ENTMCNC: 31 G/DL (ref 31.5–36.5)
MCV RBC AUTO: 99 FL (ref 78–100)
MONOS+MACROS NFR FLD MANUAL: 11 %
NEUTS BAND NFR FLD MANUAL: 88 %
PLATELET # BLD AUTO: 344 10E9/L (ref 150–450)
POTASSIUM SERPL-SCNC: 3.9 MMOL/L (ref 3.4–5.3)
RBC # BLD AUTO: 3.23 10E12/L (ref 4.4–5.9)
RBC # FLD: NORMAL /UL
SODIUM SERPL-SCNC: 137 MMOL/L (ref 133–144)
SPECIMEN SOURCE FLD: NORMAL
SPECIMEN SOURCE: ABNORMAL
SPECIMEN SOURCE: NORMAL
SPECIMEN SOURCE: NORMAL
VANCOMYCIN SERPL-MCNC: 28.8 MG/L
WBC # BLD AUTO: 5.4 10E9/L (ref 4–11)
WBC # FLD AUTO: 2527 /UL

## 2018-01-13 PROCEDURE — 80202 ASSAY OF VANCOMYCIN: CPT | Performed by: INTERNAL MEDICINE

## 2018-01-13 PROCEDURE — 80048 BASIC METABOLIC PNL TOTAL CA: CPT | Performed by: INTERNAL MEDICINE

## 2018-01-13 PROCEDURE — 89051 BODY FLUID CELL COUNT: CPT | Performed by: INTERNAL MEDICINE

## 2018-01-13 PROCEDURE — 12000008 ZZH R&B INTERMEDIATE UMMC

## 2018-01-13 PROCEDURE — 36415 COLL VENOUS BLD VENIPUNCTURE: CPT | Performed by: INTERNAL MEDICINE

## 2018-01-13 PROCEDURE — 87181 SC STD AGAR DILUTION PER AGT: CPT | Performed by: INTERNAL MEDICINE

## 2018-01-13 PROCEDURE — 80170 ASSAY OF GENTAMICIN: CPT | Performed by: INTERNAL MEDICINE

## 2018-01-13 PROCEDURE — 25000132 ZZH RX MED GY IP 250 OP 250 PS 637: Performed by: STUDENT IN AN ORGANIZED HEALTH CARE EDUCATION/TRAINING PROGRAM

## 2018-01-13 PROCEDURE — 25000132 ZZH RX MED GY IP 250 OP 250 PS 637: Performed by: HOSPITALIST

## 2018-01-13 PROCEDURE — 27210995 ZZH RX 272: Performed by: INTERNAL MEDICINE

## 2018-01-13 PROCEDURE — 25000128 H RX IP 250 OP 636: Performed by: STUDENT IN AN ORGANIZED HEALTH CARE EDUCATION/TRAINING PROGRAM

## 2018-01-13 PROCEDURE — 00000146 ZZHCL STATISTIC GLUCOSE BY METER IP

## 2018-01-13 PROCEDURE — 85027 COMPLETE CBC AUTOMATED: CPT | Performed by: INTERNAL MEDICINE

## 2018-01-13 PROCEDURE — 40000095 ZZH STATISTIC LEVEL 2 EST PATIENT

## 2018-01-13 PROCEDURE — 25000132 ZZH RX MED GY IP 250 OP 250 PS 637: Performed by: INTERNAL MEDICINE

## 2018-01-13 PROCEDURE — 87070 CULTURE OTHR SPECIMN AEROBIC: CPT | Performed by: INTERNAL MEDICINE

## 2018-01-13 PROCEDURE — 99233 SBSQ HOSP IP/OBS HIGH 50: CPT | Mod: GC | Performed by: INTERNAL MEDICINE

## 2018-01-13 PROCEDURE — 25000128 H RX IP 250 OP 636: Performed by: INTERNAL MEDICINE

## 2018-01-13 PROCEDURE — 87106 FUNGI IDENTIFICATION YEAST: CPT | Performed by: INTERNAL MEDICINE

## 2018-01-13 PROCEDURE — 25000131 ZZH RX MED GY IP 250 OP 636 PS 637: Performed by: INTERNAL MEDICINE

## 2018-01-13 PROCEDURE — 83735 ASSAY OF MAGNESIUM: CPT | Performed by: INTERNAL MEDICINE

## 2018-01-13 RX ORDER — GENTAMICIN SULFATE 1 MG/G
CREAM TOPICAL DAILY PRN
Status: DISCONTINUED | OUTPATIENT
Start: 2018-01-13 | End: 2018-01-13

## 2018-01-13 RX ORDER — METRONIDAZOLE 500 MG/1
500 TABLET ORAL EVERY 6 HOURS SCHEDULED
Status: DISCONTINUED | OUTPATIENT
Start: 2018-01-13 | End: 2018-01-20 | Stop reason: HOSPADM

## 2018-01-13 RX ADMIN — TORSEMIDE 100 MG: 100 TABLET ORAL at 16:07

## 2018-01-13 RX ADMIN — PREDNISONE 5 MG: 2.5 TABLET ORAL at 09:15

## 2018-01-13 RX ADMIN — HYDRALAZINE HYDROCHLORIDE 25 MG: 25 TABLET ORAL at 09:13

## 2018-01-13 RX ADMIN — INSULIN ASPART 2 UNITS: 100 INJECTION, SOLUTION INTRAVENOUS; SUBCUTANEOUS at 19:03

## 2018-01-13 RX ADMIN — ATORVASTATIN CALCIUM 40 MG: 40 TABLET, FILM COATED ORAL at 20:05

## 2018-01-13 RX ADMIN — INSULIN ASPART 4 UNITS: 100 INJECTION, SOLUTION INTRAVENOUS; SUBCUTANEOUS at 09:19

## 2018-01-13 RX ADMIN — SPIRONOLACTONE 50 MG: 25 TABLET ORAL at 09:13

## 2018-01-13 RX ADMIN — CALCIUM ACETATE 667 MG: 667 CAPSULE ORAL at 19:05

## 2018-01-13 RX ADMIN — ACETAMINOPHEN 650 MG: 325 TABLET ORAL at 16:07

## 2018-01-13 RX ADMIN — SODIUM CHLORIDE, SODIUM LACTATE, CALCIUM CHLORIDE, MAGNESIUM CHLORIDE AND DEXTROSE: 4.25; 538; 448; 18.3; 5.08 INJECTION, SOLUTION INTRAPERITONEAL at 15:44

## 2018-01-13 RX ADMIN — ALBUTEROL SULFATE 2 PUFF: 90 AEROSOL, METERED RESPIRATORY (INHALATION) at 21:08

## 2018-01-13 RX ADMIN — CALCITRIOL 0.25 MCG: 0.25 CAPSULE, LIQUID FILLED ORAL at 09:15

## 2018-01-13 RX ADMIN — SENNOSIDES AND DOCUSATE SODIUM 1 TABLET: 8.6; 5 TABLET ORAL at 20:05

## 2018-01-13 RX ADMIN — ALBUTEROL SULFATE 2 PUFF: 90 AEROSOL, METERED RESPIRATORY (INHALATION) at 09:18

## 2018-01-13 RX ADMIN — METOPROLOL SUCCINATE 100 MG: 100 TABLET, EXTENDED RELEASE ORAL at 09:11

## 2018-01-13 RX ADMIN — METRONIDAZOLE 500 MG: 500 TABLET ORAL at 23:36

## 2018-01-13 RX ADMIN — METRONIDAZOLE 500 MG: 500 TABLET ORAL at 19:05

## 2018-01-13 RX ADMIN — INSULIN ASPART 2 UNITS: 100 INJECTION, SOLUTION INTRAVENOUS; SUBCUTANEOUS at 14:03

## 2018-01-13 RX ADMIN — SODIUM CHLORIDE, SODIUM LACTATE, CALCIUM CHLORIDE, MAGNESIUM CHLORIDE AND DEXTROSE: 2.5; 538; 448; 18.3; 5.08 INJECTION, SOLUTION INTRAPERITONEAL at 15:48

## 2018-01-13 RX ADMIN — TRAMADOL HYDROCHLORIDE 50 MG: 50 TABLET, COATED ORAL at 16:07

## 2018-01-13 RX ADMIN — CALCIUM ACETATE 667 MG: 667 CAPSULE ORAL at 12:21

## 2018-01-13 RX ADMIN — MICAFUNGIN SODIUM 100 MG: 10 INJECTION, POWDER, LYOPHILIZED, FOR SOLUTION INTRAVENOUS at 16:10

## 2018-01-13 RX ADMIN — FLUTICASONE PROPIONATE 1 SPRAY: 50 SPRAY, METERED NASAL at 09:18

## 2018-01-13 RX ADMIN — ASPIRIN 81 MG CHEWABLE TABLET 81 MG: 81 TABLET CHEWABLE at 09:12

## 2018-01-13 RX ADMIN — ALBUTEROL SULFATE 2 PUFF: 90 AEROSOL, METERED RESPIRATORY (INHALATION) at 02:47

## 2018-01-13 RX ADMIN — HYDRALAZINE HYDROCHLORIDE 25 MG: 25 TABLET ORAL at 20:05

## 2018-01-13 RX ADMIN — BENZONATATE 100 MG: 100 CAPSULE, LIQUID FILLED ORAL at 16:07

## 2018-01-13 RX ADMIN — AMLODIPINE BESYLATE 5 MG: 5 TABLET ORAL at 09:11

## 2018-01-13 RX ADMIN — TRAMADOL HYDROCHLORIDE 50 MG: 50 TABLET, COATED ORAL at 22:03

## 2018-01-13 RX ADMIN — OSELTAMIVIR PHOSPHATE 75 MG: 75 CAPSULE ORAL at 09:17

## 2018-01-13 RX ADMIN — ACETAMINOPHEN 650 MG: 325 TABLET ORAL at 09:29

## 2018-01-13 RX ADMIN — SENNOSIDES AND DOCUSATE SODIUM 1 TABLET: 8.6; 5 TABLET ORAL at 09:12

## 2018-01-13 RX ADMIN — ALBUTEROL SULFATE 2 PUFF: 90 AEROSOL, METERED RESPIRATORY (INHALATION) at 14:48

## 2018-01-13 RX ADMIN — LOSARTAN POTASSIUM 100 MG: 50 TABLET ORAL at 09:13

## 2018-01-13 RX ADMIN — CALCIUM ACETATE 667 MG: 667 CAPSULE ORAL at 09:12

## 2018-01-13 RX ADMIN — HYDRALAZINE HYDROCHLORIDE 25 MG: 25 TABLET ORAL at 14:09

## 2018-01-13 RX ADMIN — TORSEMIDE 100 MG: 100 TABLET ORAL at 09:15

## 2018-01-13 RX ADMIN — ALLOPURINOL 400 MG: 300 TABLET ORAL at 09:14

## 2018-01-13 RX ADMIN — OMEPRAZOLE 20 MG: 20 CAPSULE, DELAYED RELEASE ORAL at 09:15

## 2018-01-13 RX ADMIN — ACETAMINOPHEN 650 MG: 325 TABLET ORAL at 22:03

## 2018-01-13 RX ADMIN — CEFTAZIDIME: 2 INJECTION, POWDER, FOR SOLUTION INTRAVENOUS at 15:48

## 2018-01-13 RX ADMIN — OSELTAMIVIR PHOSPHATE 75 MG: 75 CAPSULE ORAL at 20:05

## 2018-01-13 RX ADMIN — SODIUM CHLORIDE, SODIUM LACTATE, CALCIUM CHLORIDE, MAGNESIUM CHLORIDE AND DEXTROSE: 4.25; 538; 448; 18.3; 5.08 INJECTION, SOLUTION INTRAPERITONEAL at 15:48

## 2018-01-13 ASSESSMENT — PAIN DESCRIPTION - DESCRIPTORS: DESCRIPTORS: DISCOMFORT

## 2018-01-13 NOTE — PLAN OF CARE
Problem: Patient Care Overview  Goal: Plan of Care/Patient Progress Review  Outcome: Therapy, progress towards functional goals is fair  HD8 admitted with peritonitis. A&Ox4, VSS on room air, PIV is SL, PD port is SL. Pain is controlled with tylenol and tramadol. Influenza A positive, Concern for viral pneumonia. LS crackles and coarse in lower lobes. BG checks AC/HS. Albuterol inhaler PRN. Anuric, Tolerating a mod cho diet. Last weight taken after PD and prior to lunch. Plan is to establish hemodialysis access and then remove Peritoneal dialysis access under general anesthesia on 1/16/2018.    Pt would take bedside report if awake.

## 2018-01-13 NOTE — PROGRESS NOTES
PERITONEAL DIALYSIS CYCLER TREATMENT NOTE      Date:  1/13/2018  Time:  3:40 PM    Data:   Treatment Ultrafiltrate Volume mL:  , , AD 1:32, tolerated therapy well   Total Duration of Therapy: 8 hours   Fill Volume: 2000 mL  Heater Bag: Volume 6l and Dextrose Concentration 2.5%  Side Bag(s): Volume 3L  and Dextrose Concentration 4.25% x3, 2L 1.5% with abx for last fill tomorrow and will dwell all day  Total Number of Cycles: 4   Post Weight after Last Fill: kg 89.7      Assessment:   PD Patient Response:  Tolerating well. Exit site care done qd by pt. Site c/d/i, using gent cream and gauze. Effluent hazy, yellow. Pt reports no abdominal pain today.    Interventions:   Comments:  PD cycler set by PD RN for tonights therapy. Sample bag connected to drain line for effluent sample cell count collection with first drain after 1st cycle. Floor RN aware and will collect.      Plan:       Next treatment per Renal

## 2018-01-13 NOTE — PLAN OF CARE
VSS. Contact/droplet isolation. Up ad edith. Pt states he has had about 4 loose BMs today - took magnesium citrate, senna, and miralax yesterday for constipation. Pain managed - prn tylenol and tramadol given x1 for c/o abdominal pain. PIV saline locked. BG checks 146 and 181. Tolerating renal/moderate CHO diet. CT of chest, abdomen, and pelvis completed. Occasionally voids small amounts. Peritoneal dialysis started around 2200. Peritoneal fluid specimen to be sent to lab for cell count with differential, should be able to collect around 2345. Continue with POC.

## 2018-01-13 NOTE — PROGRESS NOTES
[facility], [place]    Internal Medicine Progress Note - [facility] Service    Main Plans for Today   - Per ID, start micafungin 100mg IV qday + metronidazole 500mg PO q6  - Microbiology to perform sensitivities on candida glabrata  - Patient evaluated by transplant surgery re: PD catheter (catheter placed by Dr. Esteban Arvizu). Plan for PD catheter removal on Tuesday.     Assessment & Plan   Murray Nicholson is a 63 year old male with history of T2DM, MGUS, HLD, HTN, ESRD (likely 2/2 DM,HTN) on PD, HFrEF (EF - 20-25% with severe global hypokinesis), anemia of chronic disease and gout on allopurinol and prednisone who is admitted for further treatment of bacterial peritonitis that has failed outpatient treatment. Patient hemodynamically stable.      # Bacterial peritonitis s/p treatment failure (9 day intraperitoneal course of Ceftazidime).  # ESRD 2/2 T2Dm, HTN - on PD  Patient with abdominal pain, cloudy dialysate and s/p 9 day course of intraperitoneal ceftazidime for suspected peritonitis. Cultures were done at [facility]. Per Nephrology note, peritoneal fluid notable for E.Coli,  Pseudomonas and Streptococcus. Now growing candida glabrata and bacteroides caccae on day 4. WBC in peritoneal fluid originally improving, but repeat testing yesterday showed WBC 2527. Repeat CT showed no significant changes.   - Surgery consulted for removal of PD line  - ID consulted, appreciate recs  - Patient will continue on ceftazidime (IP), vancomycin, and gentamicin   - Per ID, add micafungin for candida glabrata and metronidazole for bacteroides caccae. Sensitivities pending for candida.   - Monitor vital signs closely  - Nephrology following, appreciate recs  - Continue PTA calcitriol, and phoslo    # Influenza  CXR on 1/11 showed right LL opacity.  - Continue flonase  - Tessalon perles  - Tamiflu 75mg BID for 5 days    # Hypokalemia  - Replace PRN     # T2DM  Last A1C - 6.4 in 6/2017, but 9.1 on admission. On  Glipizide PTA and no insulin. BG has been elevated this admission likely due to acute infection, but based on his A1c his BG hasn't been well controlled in recent months.  - Hold Glipizide due to hypoglycemia risk. Will resume at discharge  - High dose SSI  - Glucose checks  - Hypoglycemia protocol     #HFrEF (EF 20-25% on 4/2017 TTE) - Stage C, NYHA class III  #CAD  #HTN  #HLD  #Ascending aortic aneurysm  No cardiorespiratory symptoms on presentation. On Losartan, Toprol and ASA. Last Echo 4/2017 with  coronary angiogram yet to be done, although ordered. Ascending aortic aneurysm 4.8cm as at last check 8/2/2017.   - Continue PTA ASA 81mg daily  - Continue PTA Losartan 100mg daily  - Continue  PTA Toprol XL 100mg daily  - Continue PTA Torsemide 100mg BID (was on bumex but currently on torsemide which is a recent change)  - Continue PTA Atorvastatin 40mg daily  - Continue PTA Amlodipine 5 mg daily  - CORE clinic referral on d/c  - Continue  PTA spironolactone 50mg daily   - Restart PTA Hydralazine at a lower dose 25mg TID (50mg TID at home)  - Hold Imdur     CHRONIC PROBLEMS  # Gout. Stable. Continue Allopurinol, prednisone (patient reports he gets frequent flares)  # MGUS. Stable.  # Anemia of chronic disease: Stable    # GERD. Stable. On Omeprazole      Medications held:  Imdur  Glipizide    Diet: Combination Diet Renal Diet; 3381-5509 Calories: Moderate Consistent CHO (4-6 CHO units/meal)  Fluids: none  DVT Prophylaxis: Pneumatic Compression Devices, ambulate   Code Status: Full Code    Disposition Plan   Expected discharge: TBD, recommended to prior living arrangement once adequate pain management/ tolerating PO medications and antibiotic plan established.     Entered: Alma Rosa Andrews 01/13/2018, 3:42 PM   Information in the above section will display in the discharge planner report.      The patient's care was discussed with the Attending Physician, Dr. JONES and Bedside Nurse.    Alma Rosa Andrews,  MD  PGY2    Please see sticky note for cross cover information    Interval History   No acute events overnight. Patient reports his cough is a little better. He denies significant abdominal pain today. His peritoneal fluid continues to be cloudy.    Physical Exam   /65  Pulse 94  Temp 97.4  F (36.3  C) (Oral)  Resp 18  Wt 89.7 kg (197 lb 11.2 oz)  SpO2 97%  BMI 29.2 kg/m2  General Appearance: Pleasant man in NAD.   Respiratory: Mild crackles at bilateral bases. No wheezing or crackles.   Cardiovascular: RRR. No murmurs or crackles.   GI: Soft, non-tender, non-distended. Normoactive bowel sounds.  Skin: no rashes or concerning lesions, no jaundice  Neuro: Alert and oriented x3. CNII-XII grossly intact.       Data   Medications     peritoneal dialysis solutions ADULT       peritoneal dialysis solutions ADULT       peritoneal dialysis solutions ADULT       peritoneal dialysis solutions ADULT       peritoneal dialysis solutions ADULT       peritoneal dialysis solutions ADULT         micafungin  100 mg Intravenous Q24H     metroNIDAZOLE  500 mg Oral Q6H JEAN     oseltamivir  75 mg Oral BID     insulin aspart  1-10 Units Subcutaneous TID AC     insulin aspart  1-7 Units Subcutaneous At Bedtime     allopurinol  400 mg Oral Daily     hydrALAZINE  25 mg Oral TID     torsemide  100 mg Oral BID     predniSONE  5 mg Oral Daily     senna-docusate  1 tablet Oral BID     spironolactone  50 mg Oral Daily     amLODIPine  5 mg Oral Daily     aspirin  81 mg Oral Daily     atorvastatin  40 mg Oral Daily     fluticasone  1-2 spray Both Nostrils Daily     calcitRIOL  0.25 mcg Oral Daily     calcium acetate  667 mg Oral TID w/meals     omeprazole  20 mg Oral Daily     losartan  100 mg Oral Daily     metoprolol succinate  100 mg Oral Daily     Data     Recent Labs  Lab 01/13/18  0956 01/12/18  0742 01/11/18  0945  01/07/18  1655   WBC 5.4 4.1 3.9*  < > 6.3   HGB 9.9* 9.8* 9.5*  < > 10.0*   MCV 99 99 99  < > 99    323 307   < > 277    137 140  < > 137   POTASSIUM 3.9 3.5 3.5  < > 3.7   CHLORIDE 97 98 100  < > 97   CO2 32 32 32  < > 28   BUN 39* 38* 40*  < > 42*   CR 8.52* 8.27* 8.66*  < > 7.10*   ANIONGAP 8 7 8  < > 12   TRINI 8.6 8.4* 8.0*  < > 8.2*   * 250* 195*  < > 171*   ALBUMIN  --   --   --   --  1.8*   PROTTOTAL  --   --   --   --  6.0*   BILITOTAL  --   --   --   --  0.4   ALKPHOS  --   --   --   --  76   ALT  --   --   --   --  24   AST  --   --   --   --  32   < > = values in this interval not displayed.

## 2018-01-13 NOTE — PROGRESS NOTES
Nephrology Progress Note  01/13/2018         Assessment & Recommendations:   Murray Nicholson is a 63 year old male with a PMH of ESRD sec to likely DMII and HTN , CAD , HFrEF 20-25%, HLD, MGUS, SHEELA , AAA, Gout on prednisone and allopurinol, GERD, admitted with peritonitis diagnosed 12/29 outpatient and was treated with ceftazidime 1500mg intraperitoneal, presents with cloudy PD fluid , fatigue and abdominal pain.  Nephrology following for ESRD mangement and peritoneal infection.    1. Peritonitis with polymicrobial infection  Repeat cytology and Cx sent on 1/7. Cx GNR, Yeast (yeast, most likely contamination per ID). Blood Cx NGTD.  Grossly cloudy PD fluid on admission cell counts >4000 ->decreased to 457->109->874 with 90% neutrophils on 1/12 and today 2527. Peritoneal fluid cloudy again today, abdominal pain and tenderness unchanged. Afebrile. Current antibiotics ceftazidine 1.5 g ip qd, vanco ip to maintain trough >20 (received dose yesterday), and gentamicin started yesterday. CT abd - negative  Based on the data he has failed treatment (twice) and it is time to remove PD catheter and transition to hemodialysis for a few weeks.    Recommend:  1. Consult Dr. Esteban Arvizu transplant surgery who placed catheter regarding removal of PD catheter and place tunneled dialysis catheter.  2. Continue ceftaz, vanco, gent  3. Cell count and PD fluid culture tonight     2. ESRD on PD since 06/2017  [facility] under Dr Mcpherson's care  PD prescription: 8hrs , 4 cycles , FV 2000, 2hr dwell , dextrose variable  UF on average 300-900  EDW 86Kg (190lbs)  Kt/V 1.5 PTA  Net UF about 700 ml  - PD today :8hrs, x4 cycles 2hr dwell with 4.25% and 2.5% (same as yesterday)      Electrolytes  Hypokalemia  Repleted with potassium 40meq on 1/10. K 3.5 today      MBD  Phos 3.3, (10/17)  Calcium 8.4, albumin 1.8  On phoslo and Vit D      Anemia  Hb 9.8  On EPO 52792 units Qmonthly  Transfuse if <7  Iron replete per PTA labs       3.  Influenza with pneumonia - bilateral infiltrates on chest CT    Recommendations were communicated to primary team in person.      Antonio Otoole MD  [phone]    Interval History :   Up last night coughing. Abdominal pain controlled with pain meds - not increased. PD fluid cloudy. No fever or chills. No SOB.    Review of Systems:   I reviewed the following systems:  GI: Good appetite. - large BM yesterday (was constipated)  Neuro:  -confusion  Constitutional:  -fever -chills  CV: -dyspnea +edema.  - chest pain.    Physical Exam:   I/O last 3 completed shifts:  In: 672 [P.O.:350; I.V.:50; Other:272]  Out: 1550 [Urine:50; Stool:1500]   /68  Pulse 94  Temp 97.4  F (36.3  C) (Oral)  Resp 18  Wt 89.7 kg (197 lb 11.2 oz)  SpO2 97%  BMI 29.2 kg/m2     GENERAL APPEARANCE: NAD  EYES:  No scleral icterus, pupils equal  HENT: mouth without ulcers or lesions  PULM: good ae bilat - bilateral crackles bases  CV: regular rhythm, normal rate, no rub     -JVD -     -edema bilateral 1+  GI: soft, mildly tender, slightly distended, bowel sounds are present  INTEGUMENT: no cyanosis, no rash  NEURO:  no asterixis      Labs:   All labs reviewed by me  Electrolytes/Renal -   Recent Labs   Lab Test  01/12/18   0742  01/11/18   0945  01/10/18   1015   11/21/17   1239  11/15/17   0758  11/14/17   0645   NA  137  140  138   < >  142  144  141   POTASSIUM  3.5  3.5  3.1*   < >  4.6  3.2*  3.7   CHLORIDE  98  100  98   < >  102  103  103   CO2  32  32  31   < >  29  30  27   BUN  38*  40*  39*   < >  66*  69*  76*   CR  8.27*  8.66*  8.48*   < >  7.65*  6.98*  6.85*   GLC  250*  195*  120*   < >  189*  294*  312*   TRINI  8.4*  8.0*  7.8*   < >  8.9  8.9  8.9   MAG  1.7   --    --    --   1.9  1.9  1.8   PHOS  3.3   --    --    --    --   5.0*  4.3    < > = values in this interval not displayed.       CBC -   Recent Labs   Lab Test  01/12/18   0742  01/11/18   0945  01/10/18   1015   WBC  4.1  3.9*  3.3*   HGB  9.8*  9.5*  9.6*    PLT  323  307  250       LFTs -   Recent Labs   Lab Test  01/07/18   1655  11/13/17   1709  08/02/17   1311  07/05/17   1204   ALKPHOS  76  104   --   98   BILITOTAL  0.4  0.8   --   0.5   ALT  24  26   --   15   AST  32  22   --   13   PROTTOTAL  6.0*  6.0*   --   6.2*   ALBUMIN  1.8*  2.3*  3.2*  2.7*       Iron Panel -   Recent Labs   Lab Test  07/19/17   1306  07/05/17   1204  06/21/17   1058   IRON  46  26*  69   IRONSAT  18  12*  29   ALBERTINA  369  542*  557*       Current Medications:    oseltamivir  75 mg Oral BID     insulin aspart  1-10 Units Subcutaneous TID AC     insulin aspart  1-7 Units Subcutaneous At Bedtime     allopurinol  400 mg Oral Daily     hydrALAZINE  25 mg Oral TID     torsemide  100 mg Oral BID     predniSONE  5 mg Oral Daily     senna-docusate  1 tablet Oral BID     spironolactone  50 mg Oral Daily     amLODIPine  5 mg Oral Daily     aspirin  81 mg Oral Daily     atorvastatin  40 mg Oral Daily     fluticasone  1-2 spray Both Nostrils Daily     calcitRIOL  0.25 mcg Oral Daily     calcium acetate  667 mg Oral TID w/meals     omeprazole  20 mg Oral Daily     losartan  100 mg Oral Daily     metoprolol succinate  100 mg Oral Daily       peritoneal dialysis solutions ADULT       peritoneal dialysis solutions ADULT       peritoneal dialysis solutions ADULT       Antonio Otoole MD

## 2018-01-13 NOTE — PLAN OF CARE
"Problem: Patient Care Overview  Goal: Plan of Care/Patient Progress Review  Outcome: No Change  Contact and Droplet isolation. AVSS on 1.5L NC. A+OX4. Denies pain and nausea and declined intervention. Reported SOB due to \"phlegm in my chest\", improved after inhaler given. Renal and moderate CHO diet. Peritoneal Dialysis until 0600, will weight patient once it is completed. Peritoneal fluid sample collected. 0200 BG elevated, MD notified. No void or BM overnight. UAL, steady on feet.   Continue POC. Patient OK with bedsidd report but does not want to be woken for it.       "

## 2018-01-14 LAB
ANION GAP SERPL CALCULATED.3IONS-SCNC: 8 MMOL/L (ref 3–14)
APPEARANCE FLD: NORMAL
BUN SERPL-MCNC: 39 MG/DL (ref 7–30)
CALCIUM SERPL-MCNC: 8.5 MG/DL (ref 8.5–10.1)
CHLORIDE SERPL-SCNC: 96 MMOL/L (ref 94–109)
CO2 SERPL-SCNC: 31 MMOL/L (ref 20–32)
COLOR FLD: YELLOW
CREAT SERPL-MCNC: 8.18 MG/DL (ref 0.66–1.25)
ERYTHROCYTE [DISTWIDTH] IN BLOOD BY AUTOMATED COUNT: 17 % (ref 10–15)
GENTAMICIN SERPL-MCNC: 3.9 MG/L
GFR SERPL CREATININE-BSD FRML MDRD: 7 ML/MIN/1.7M2
GLUCOSE BLDC GLUCOMTR-MCNC: 185 MG/DL (ref 70–99)
GLUCOSE BLDC GLUCOMTR-MCNC: 187 MG/DL (ref 70–99)
GLUCOSE BLDC GLUCOMTR-MCNC: 209 MG/DL (ref 70–99)
GLUCOSE BLDC GLUCOMTR-MCNC: 215 MG/DL (ref 70–99)
GLUCOSE BLDC GLUCOMTR-MCNC: 234 MG/DL (ref 70–99)
GLUCOSE SERPL-MCNC: 260 MG/DL (ref 70–99)
HCT VFR BLD AUTO: 31 % (ref 40–53)
HGB BLD-MCNC: 9.6 G/DL (ref 13.3–17.7)
LYMPHOCYTES NFR FLD MANUAL: 21 %
MCH RBC QN AUTO: 30.8 PG (ref 26.5–33)
MCHC RBC AUTO-ENTMCNC: 31 G/DL (ref 31.5–36.5)
MCV RBC AUTO: 99 FL (ref 78–100)
MONOS+MACROS NFR FLD MANUAL: 23 %
NEUTS BAND NFR FLD MANUAL: 56 %
PLATELET # BLD AUTO: 337 10E9/L (ref 150–450)
POTASSIUM SERPL-SCNC: 3.5 MMOL/L (ref 3.4–5.3)
RBC # BLD AUTO: 3.12 10E12/L (ref 4.4–5.9)
SODIUM SERPL-SCNC: 135 MMOL/L (ref 133–144)
SPECIMEN SOURCE FLD: NORMAL
WBC # BLD AUTO: 5.6 10E9/L (ref 4–11)
WBC # FLD AUTO: 268 /UL

## 2018-01-14 PROCEDURE — 99233 SBSQ HOSP IP/OBS HIGH 50: CPT | Performed by: INTERNAL MEDICINE

## 2018-01-14 PROCEDURE — 25000132 ZZH RX MED GY IP 250 OP 250 PS 637: Performed by: INTERNAL MEDICINE

## 2018-01-14 PROCEDURE — 80170 ASSAY OF GENTAMICIN: CPT | Performed by: STUDENT IN AN ORGANIZED HEALTH CARE EDUCATION/TRAINING PROGRAM

## 2018-01-14 PROCEDURE — 12000008 ZZH R&B INTERMEDIATE UMMC

## 2018-01-14 PROCEDURE — 25000132 ZZH RX MED GY IP 250 OP 250 PS 637: Performed by: HOSPITALIST

## 2018-01-14 PROCEDURE — 25000128 H RX IP 250 OP 636: Performed by: INTERNAL MEDICINE

## 2018-01-14 PROCEDURE — 25000128 H RX IP 250 OP 636: Performed by: STUDENT IN AN ORGANIZED HEALTH CARE EDUCATION/TRAINING PROGRAM

## 2018-01-14 PROCEDURE — 85027 COMPLETE CBC AUTOMATED: CPT | Performed by: STUDENT IN AN ORGANIZED HEALTH CARE EDUCATION/TRAINING PROGRAM

## 2018-01-14 PROCEDURE — 25000131 ZZH RX MED GY IP 250 OP 636 PS 637: Performed by: INTERNAL MEDICINE

## 2018-01-14 PROCEDURE — 25000132 ZZH RX MED GY IP 250 OP 250 PS 637: Performed by: STUDENT IN AN ORGANIZED HEALTH CARE EDUCATION/TRAINING PROGRAM

## 2018-01-14 PROCEDURE — 25000128 H RX IP 250 OP 636

## 2018-01-14 PROCEDURE — 27210995 ZZH RX 272: Performed by: INTERNAL MEDICINE

## 2018-01-14 PROCEDURE — 36415 COLL VENOUS BLD VENIPUNCTURE: CPT | Performed by: STUDENT IN AN ORGANIZED HEALTH CARE EDUCATION/TRAINING PROGRAM

## 2018-01-14 PROCEDURE — 40000095 ZZH STATISTIC LEVEL 2 EST PATIENT

## 2018-01-14 PROCEDURE — 80048 BASIC METABOLIC PNL TOTAL CA: CPT | Performed by: STUDENT IN AN ORGANIZED HEALTH CARE EDUCATION/TRAINING PROGRAM

## 2018-01-14 PROCEDURE — 00000146 ZZHCL STATISTIC GLUCOSE BY METER IP

## 2018-01-14 RX ORDER — GENTAMICIN SULFATE 1 MG/G
CREAM TOPICAL DAILY PRN
Status: DISCONTINUED | OUTPATIENT
Start: 2018-01-14 | End: 2018-01-15

## 2018-01-14 RX ORDER — SODIUM CHLORIDE 9 MG/ML
INJECTION, SOLUTION INTRAVENOUS
Status: COMPLETED
Start: 2018-01-14 | End: 2018-01-14

## 2018-01-14 RX ADMIN — ALLOPURINOL 400 MG: 300 TABLET ORAL at 09:37

## 2018-01-14 RX ADMIN — SPIRONOLACTONE 50 MG: 25 TABLET ORAL at 09:42

## 2018-01-14 RX ADMIN — METOPROLOL SUCCINATE 100 MG: 100 TABLET, EXTENDED RELEASE ORAL at 09:36

## 2018-01-14 RX ADMIN — AMLODIPINE BESYLATE 5 MG: 5 TABLET ORAL at 09:37

## 2018-01-14 RX ADMIN — TRAMADOL HYDROCHLORIDE 50 MG: 50 TABLET, COATED ORAL at 03:55

## 2018-01-14 RX ADMIN — TRAMADOL HYDROCHLORIDE 50 MG: 50 TABLET, COATED ORAL at 20:11

## 2018-01-14 RX ADMIN — ALBUTEROL SULFATE 2 PUFF: 90 AEROSOL, METERED RESPIRATORY (INHALATION) at 22:27

## 2018-01-14 RX ADMIN — HYDRALAZINE HYDROCHLORIDE 25 MG: 25 TABLET ORAL at 13:48

## 2018-01-14 RX ADMIN — CALCITRIOL 0.25 MCG: 0.25 CAPSULE, LIQUID FILLED ORAL at 09:38

## 2018-01-14 RX ADMIN — HYDRALAZINE HYDROCHLORIDE 25 MG: 25 TABLET ORAL at 20:11

## 2018-01-14 RX ADMIN — HYDRALAZINE HYDROCHLORIDE 25 MG: 25 TABLET ORAL at 09:38

## 2018-01-14 RX ADMIN — TORSEMIDE 100 MG: 100 TABLET ORAL at 16:13

## 2018-01-14 RX ADMIN — FLUTICASONE PROPIONATE 1 SPRAY: 50 SPRAY, METERED NASAL at 09:34

## 2018-01-14 RX ADMIN — ATORVASTATIN CALCIUM 40 MG: 40 TABLET, FILM COATED ORAL at 20:10

## 2018-01-14 RX ADMIN — OMEPRAZOLE 20 MG: 20 CAPSULE, DELAYED RELEASE ORAL at 09:40

## 2018-01-14 RX ADMIN — ALBUTEROL SULFATE 2 PUFF: 90 AEROSOL, METERED RESPIRATORY (INHALATION) at 09:53

## 2018-01-14 RX ADMIN — OSELTAMIVIR PHOSPHATE 75 MG: 75 CAPSULE ORAL at 20:07

## 2018-01-14 RX ADMIN — SODIUM CHLORIDE 500 ML: 9 INJECTION, SOLUTION INTRAVENOUS at 16:14

## 2018-01-14 RX ADMIN — CALCIUM ACETATE 667 MG: 667 CAPSULE ORAL at 20:10

## 2018-01-14 RX ADMIN — ACETAMINOPHEN 650 MG: 325 TABLET ORAL at 01:52

## 2018-01-14 RX ADMIN — CALCIUM ACETATE 667 MG: 667 CAPSULE ORAL at 13:47

## 2018-01-14 RX ADMIN — SODIUM CHLORIDE, SODIUM LACTATE, CALCIUM CHLORIDE, MAGNESIUM CHLORIDE AND DEXTROSE: 4.25; 538; 448; 18.3; 5.08 INJECTION, SOLUTION INTRAPERITONEAL at 16:09

## 2018-01-14 RX ADMIN — INSULIN ASPART 2 UNITS: 100 INJECTION, SOLUTION INTRAVENOUS; SUBCUTANEOUS at 20:09

## 2018-01-14 RX ADMIN — SENNOSIDES AND DOCUSATE SODIUM 1 TABLET: 8.6; 5 TABLET ORAL at 20:11

## 2018-01-14 RX ADMIN — POLYETHYLENE GLYCOL 3350 17 G: 17 POWDER, FOR SOLUTION ORAL at 09:54

## 2018-01-14 RX ADMIN — SODIUM CHLORIDE, SODIUM LACTATE, CALCIUM CHLORIDE, MAGNESIUM CHLORIDE AND DEXTROSE: 4.25; 538; 448; 18.3; 5.08 INJECTION, SOLUTION INTRAPERITONEAL at 16:08

## 2018-01-14 RX ADMIN — MICAFUNGIN SODIUM 100 MG: 10 INJECTION, POWDER, LYOPHILIZED, FOR SOLUTION INTRAVENOUS at 16:14

## 2018-01-14 RX ADMIN — SODIUM CHLORIDE, SODIUM LACTATE, CALCIUM CHLORIDE, MAGNESIUM CHLORIDE AND DEXTROSE: 4.25; 538; 448; 18.3; 5.08 INJECTION, SOLUTION INTRAPERITONEAL at 16:12

## 2018-01-14 RX ADMIN — CEFTAZIDIME: 2 INJECTION, POWDER, FOR SOLUTION INTRAVENOUS at 16:10

## 2018-01-14 RX ADMIN — INSULIN ASPART 4 UNITS: 100 INJECTION, SOLUTION INTRAVENOUS; SUBCUTANEOUS at 09:33

## 2018-01-14 RX ADMIN — PREDNISONE 5 MG: 2.5 TABLET ORAL at 09:39

## 2018-01-14 RX ADMIN — METRONIDAZOLE 500 MG: 500 TABLET ORAL at 06:15

## 2018-01-14 RX ADMIN — ALBUTEROL SULFATE 2 PUFF: 90 AEROSOL, METERED RESPIRATORY (INHALATION) at 16:26

## 2018-01-14 RX ADMIN — TORSEMIDE 100 MG: 100 TABLET ORAL at 09:41

## 2018-01-14 RX ADMIN — ACETAMINOPHEN 650 MG: 325 TABLET ORAL at 06:17

## 2018-01-14 RX ADMIN — SODIUM CHLORIDE, SODIUM LACTATE, CALCIUM CHLORIDE, MAGNESIUM CHLORIDE AND DEXTROSE: 4.25; 538; 448; 18.3; 5.08 INJECTION, SOLUTION INTRAPERITONEAL at 16:10

## 2018-01-14 RX ADMIN — OSELTAMIVIR PHOSPHATE 75 MG: 75 CAPSULE ORAL at 09:36

## 2018-01-14 RX ADMIN — GENTAMICIN SULFATE: 1 CREAM TOPICAL at 22:27

## 2018-01-14 RX ADMIN — ALBUTEROL SULFATE 2 PUFF: 90 AEROSOL, METERED RESPIRATORY (INHALATION) at 03:55

## 2018-01-14 RX ADMIN — ACETAMINOPHEN 650 MG: 325 TABLET ORAL at 09:54

## 2018-01-14 RX ADMIN — BENZONATATE 100 MG: 100 CAPSULE, LIQUID FILLED ORAL at 20:07

## 2018-01-14 RX ADMIN — METRONIDAZOLE 500 MG: 500 TABLET ORAL at 20:07

## 2018-01-14 RX ADMIN — INSULIN ASPART 2 UNITS: 100 INJECTION, SOLUTION INTRAVENOUS; SUBCUTANEOUS at 13:45

## 2018-01-14 RX ADMIN — CALCIUM ACETATE 667 MG: 667 CAPSULE ORAL at 09:39

## 2018-01-14 RX ADMIN — ASPIRIN 81 MG CHEWABLE TABLET 81 MG: 81 TABLET CHEWABLE at 09:35

## 2018-01-14 RX ADMIN — ACETAMINOPHEN 650 MG: 325 TABLET ORAL at 20:10

## 2018-01-14 RX ADMIN — LOSARTAN POTASSIUM 100 MG: 50 TABLET ORAL at 09:40

## 2018-01-14 RX ADMIN — METRONIDAZOLE 500 MG: 500 TABLET ORAL at 13:48

## 2018-01-14 RX ADMIN — SENNOSIDES AND DOCUSATE SODIUM 1 TABLET: 8.6; 5 TABLET ORAL at 09:41

## 2018-01-14 ASSESSMENT — PAIN DESCRIPTION - DESCRIPTORS
DESCRIPTORS: DISCOMFORT
DESCRIPTORS: CRAMPING
DESCRIPTORS: CRAMPING

## 2018-01-14 NOTE — PHARMACY-AMINOGLYCOSIDE DOSING SERVICE
Pharmacy Aminoglycoside Follow-Up Note  Date of Service 2018  Patient's  1955   63 year old, male    Weight (Actual): 87.5 kg    Indication: Intra-abdominal Infection  Current Gentamicin regimen:  Intermittent dosing. Last dose 180 mg IV x 1  Day of therapy: 2    Target goals based on intermittent dosing  Goal Peak level: N/A  Goal Trough level: < 3-5 post IP dialysis    Current estimated CrCl: CrCl cannot be calculated (Unknown ideal weight.).    Creatinine for last 3 days  2018:  9:45 AM Creatinine 8.66 mg/dL  2018:  7:42 AM Creatinine 8.27 mg/dL  2018:  9:56 AM Creatinine 8.52 mg/dL    Nephrotoxins and other renal medications (Future)    Start     Dose/Rate Route Frequency Ordered Stop    18  gentamicin (GARAMYCIN) place bui - receiving intermittent dosing      1 each Does not apply SEE ADMIN INSTRUCTIONS 01/13/18 2015      01/10/18 1600  torsemide (DEMADEX) tablet 100 mg      100 mg Oral 2 TIMES DAILY. 01/10/18 1157            Contrast Orders - past 72 hours     None          Aminoglycoside Levels - past 2 days  2018:  7:59 PM Gentamicin Level 5.2 mg/L    Aminoglycosides IV Administrations (past 72 hours)                   gentamicin (GARAMYCIN) 180 mg in NaCl 0.9 % 50 mL intermittent infusion (mg) 180 mg New Bag 18 1651                  Interpretation of levels and current regimen:  Aminoglycoside levels are outside of goal range, 24.7 hour level of 5.2    Has serum creatinine changed greater than 50% in the last 72 hours: No    Urine output:  diminished urine output    Renal function: unable to determine    Plan  1. No dose today. Repeat level in AM to evaluate clearance.    2.  Method of evaluation: trough evaluation    3. Pharmacy will continue to follow and check levels  as appropriate in 3-5 Days    Jessica Ronquillo PharmD  Antimicrobial Stewardship & Infectious Diseases Pharmacist  Office Ph: [phone]  Pager: [phone]

## 2018-01-14 NOTE — PLAN OF CARE
Problem: Patient Care Overview  Goal: Plan of Care/Patient Progress Review  Outcome: Improving  Contact and Droplet isolation. AVSS on RA. A+OX4. Pain controlled with prn tylenol and tramadol. Renal and moderate CHO diet. Peritoneal Dialysis running, will weight patient once it is completed. Peritoneal fluid sample collected. No void or BM overnight. UAL, steady on feet.   Continue POC. Patient OK with bedsidd report but does not want to be woken for it.

## 2018-01-14 NOTE — PROGRESS NOTES
PERITONEAL DIALYSIS CYCLER TREATMENT NOTE      Date:  1/14/2018  Time:  3:55 PM    Data:   Treatment Ultrafiltrate Volume mL:  UF -258, , AD 1;35   Total Duration of Therapy: 8   Fill Volume: 2000 mL  Heater Bag: Volume 3l and Dextrose Concentration 4.25 x 4  Side Bag(s): Volume 2l and Dextrose Concentration 1.5% Last fill with Abx, long dwell  Total Number of Cycles: 4   Post Weight after Last Fill: kg 91.3    Assessment:   PD Patient Response:  Tolerating well. Exit site c/d/i, pt preforming his own site care, using gent cream. Effluent yellow hazy    Interventions:   Comments:      Cycler set by PD RN. Effluent sample to be collected after dwell one.pt will connect when he is ready to initiate therapy tonight. RN will assist in collecting this. Report given to floor RN  Plan:     Next treatment per Renal

## 2018-01-14 NOTE — DOWNTIME EVENT NOTE
The EMR was down for 2 hours on 1/14/2018.    Patrizia Martinez was responsible for completing the paper charting during this time period.     The following information was re-entered into the system by Patrizia Martinez: Flowsheet data and MAR    The following information will remain in the paper chart: RYLEY Martinez  1/14/2018

## 2018-01-14 NOTE — CONSULTS
Transplant Surgery - Inpatient Consult Note  01/13/2018    Assessment & Recommendations: Murray Nicholson has renal failure with Dialysis Access issues as follows:    1. PD related peritonitis    Patient has failed treatment (twice) and needs PD catheter removed.  Will be transitioned to hemodialysis for a few weeks as per nephrology team    Plan to remove PD catheter early next week.    The above was discussed with primary team.    Medical Decision Making: Medium  Admit [id number] (moderate level decision making)    EARL/Fellow/Resident Provider: Jennifer Huffman    Faculty: Markell Ayaal M.D., Ph.D.    __________________________________________________________________    Consult requested by medical team for PD catheter removal  Admitted 1/7/2018 for PD peritonitis    ROS:   A 10-point review of systems was negative except as noted above.    Curent Meds:    micafungin  100 mg Intravenous Q24H     metroNIDAZOLE  500 mg Oral Q6H JEAN     gentamicin (GARAMYCIN) place bui - receiving intermittent dosing  1 each Does not apply See Admin Instructions     oseltamivir  75 mg Oral BID     insulin aspart  1-10 Units Subcutaneous TID AC     insulin aspart  1-7 Units Subcutaneous At Bedtime     allopurinol  400 mg Oral Daily     hydrALAZINE  25 mg Oral TID     torsemide  100 mg Oral BID     predniSONE  5 mg Oral Daily     senna-docusate  1 tablet Oral BID     spironolactone  50 mg Oral Daily     amLODIPine  5 mg Oral Daily     aspirin  81 mg Oral Daily     atorvastatin  40 mg Oral Daily     fluticasone  1-2 spray Both Nostrils Daily     calcitRIOL  0.25 mcg Oral Daily     calcium acetate  667 mg Oral TID w/meals     omeprazole  20 mg Oral Daily     losartan  100 mg Oral Daily     metoprolol succinate  100 mg Oral Daily       Physical Exam:     Admit Weight: 89.7 kg (197 lb 11.2 oz)    Current vitals:   /67  Pulse 92  Temp 98  F (36.7  C) (Oral)  Resp 18  Wt 90.6 kg (199 lb 12.8 oz)  SpO2 99%  BMI 29.51 kg/m2    Vital sign  ranges:    Temp:  [96  F (35.6  C)-98  F (36.7  C)] 98  F (36.7  C)  Pulse:  [87-96] 92  Heart Rate:  [94] 94  Resp:  [14-18] 18  BP: (110-128)/(59-75) 116/67  SpO2:  [97 %-100 %] 99 %  Patient Vitals for the past 24 hrs:   BP Temp Temp src Pulse Heart Rate Resp SpO2 Weight   01/13/18 2013 - - - - - - - 90.6 kg (199 lb 12.8 oz)   01/13/18 2005 116/67 - - - - - - -   01/13/18 1549 128/59 98  F (36.7  C) Oral 92 - 18 99 % -   01/13/18 1409 119/65 - - - - - - -   01/13/18 0913 126/68 - - - - - - -   01/13/18 0911 126/68 - - - 94 - - -   01/13/18 0734 123/72 97.4  F (36.3  C) Oral 94 - 18 97 % 89.7 kg (197 lb 11.2 oz)   01/13/18 0250 128/75 96  F (35.6  C) Axillary 92 - 15 99 % -   01/12/18 2230 110/69 97.4  F (36.3  C) Oral 87 - 14 100 % -   01/12/18 2106 - - - - - - - 89.2 kg (196 lb 9.6 oz)   01/12/18 2056 114/66 - - 96 - - - -     General Appearance: in no apparent distress.   Skin: normal, no suspicious lesions or rashes  Heart: regular rate and rhythm  Lungs: minimal dry crackles bilateral base(s)  Abdomen: The abdomen is protuberant, and tender along PD catheter tract area.   : doyle is not present.    Extremities: edema: absent.     Data:   CMP  Recent Labs  Lab 01/13/18  0956 01/12/18  0742  01/07/18  1655    137  < > 137   POTASSIUM 3.9 3.5  < > 3.7   CHLORIDE 97 98  < > 97   CO2 32 32  < > 28   * 250*  < > 171*   BUN 39* 38*  < > 42*   CR 8.52* 8.27*  < > 7.10*   GFRESTIMATED 6* 7*  < > 8*   GFRESTBLACK 8* 8*  < > 10*   TRINI 8.6 8.4*  < > 8.2*   MAG 1.8 1.7  --   --    PHOS  --  3.3  --   --    ALBUMIN  --   --   --  1.8*   BILITOTAL  --   --   --  0.4   ALKPHOS  --   --   --  76   AST  --   --   --  32   ALT  --   --   --  24   < > = values in this interval not displayed.  CBC  Recent Labs  Lab 01/13/18  0956 01/12/18  0742  01/08/18  0815   HGB 9.9* 9.8*  < > 9.0*   WBC 5.4 4.1  < > 4.4    323  < > 256   A1C  --   --   --  9.1*   < > = values in this interval not displayed.  CoagsNo lab  results found in last 7 days.    Invalid input(s): XA   Urinalysis  Recent Labs   Lab Test  05/17/17   1225  05/03/17   1344  04/19/17   1442  04/08/17   1720   COLOR   --   Yellow   --   Yellow   APPEARANCE   --   Clear   --   Clear   URINEGLC   --   Negative   --   Negative   URINEBILI   --   Negative   --   Negative   URINEKETONE   --   Negative   --   Negative   SG   --   1.011   --   1.010   UBLD   --   Negative   --   Negative   URINEPH   --   5.0   --   5.0   PROTEIN   --   100*   --   30*   NITRITE   --   Negative   --   Negative   LEUKEST   --   Negative   --   Negative   RBCU   --   1   --   <1   WBCU   --   1   --   7*   UTPG  0.67*   --   0.93*   --        Microbiology:  Fluid culture: candida and bacteroides    Virology:  Hepatitis C Antibody   Date Value Ref Range Status   08/10/2015  NR Final    Nonreactive   Assay performance characteristics have not been established for newborns,   infants, and children

## 2018-01-14 NOTE — PHARMACY-VANCOMYCIN DOSING SERVICE
Pharmacy Vancomycin Note  Date of Service 2018  Patient's  1955   63 year old, male    Indication: Intra-abdominal infection  Goal Trough Level: >20 mg/L per nephrology  Day of Therapy: 8  Current Vancomycin regimen:  Intermittent dosing, then IP x1 on      Current estimated CrCl = CrCl cannot be calculated (Unknown ideal weight.).    Creatinine for last 3 days  2018:  7:42 AM Creatinine 8.27 mg/dL  2018:  9:56 AM Creatinine 8.52 mg/dL  2018:  7:49 AM Creatinine 8.18 mg/dL    Recent Vancomycin Levels (past 3 days)  2018:  7:42 AM Vancomycin Level 32.2 mg/L;  2:02 PM Vancomycin Level 37.3 mg/L  2018:  2:40 PM Vancomycin Level 28.8 mg/L    Vancomycin IV Administrations (past 72 hours)      No vancomycin orders with administrations in past 72 hours.                Nephrotoxins and other renal medications (Future)    Start     Dose/Rate Route Frequency Ordered Stop    18  gentamicin (GARAMYCIN) place bui - receiving intermittent dosing      1 each Does not apply SEE ADMIN INSTRUCTIONS 01/13/18 2015      01/10/18 1600  torsemide (DEMADEX) tablet 100 mg      100 mg Oral 2 TIMES DAILY. 01/10/18 1157               Contrast Orders - past 72 hours     None          Interpretation of levels and current regimen:  Trough level is  Supratherapeutic    Has serum creatinine changed > 50% in last 72 hours: No    Urine output:  unable to determine    Renal Function: ARF on Dialysis    Plan:  1.  Decrease Dose to Continue to hold vancomycin, recheck in 2-3 days  2.  Pharmacy will check trough levels as appropriate in 1-3 Days.    3. Serum creatinine levels will be ordered daily for the first week of therapy and at least twice weekly for subsequent weeks.      Ragini Marcial        .

## 2018-01-14 NOTE — PHARMACY-AMINOGLYCOSIDE DOSING SERVICE
Pharmacy Aminoglycoside Follow-Up Note  Date of Service 2018  Patient's  1955   63 year old, male    Weight (Actual): 87.5 kg    Indication: Intra-abdominal Infection  Current Gentamicin regimen:  Intermittent dosing. Last dose 180mg IV x1  Day of therapy: 3    Target goals based on intermittent dosing  Goal Peak level: N/A  Goal Trough level: <3-5 post IP dialysis    Current estimated CrCl: CrCl cannot be calculated (Unknown ideal weight.).    Creatinine for last 3 days  2018:  7:42 AM Creatinine 8.27 mg/dL  2018:  9:56 AM Creatinine 8.52 mg/dL  2018:  7:49 AM Creatinine 8.18 mg/dL    Nephrotoxins and other renal medications (Future)    Start     Dose/Rate Route Frequency Ordered Stop    18  gentamicin (GARAMYCIN) place bui - receiving intermittent dosing      1 each Does not apply SEE ADMIN INSTRUCTIONS 01/13/18 2015      01/10/18 1600  torsemide (DEMADEX) tablet 100 mg      100 mg Oral 2 TIMES DAILY. 01/10/18 1157            Contrast Orders - past 72 hours     None          Aminoglycoside Levels - past 2 days  2018:  7:59 PM Gentamicin Level 5.2 mg/L  2018:  7:49 AM Gentamicin Level 3.9 mg/L    Aminoglycosides IV Administrations (past 72 hours)                   gentamicin (GARAMYCIN) 180 mg in NaCl 0.9 % 50 mL intermittent infusion (mg) 180 mg New Bag 18 [id number]                  Interpretation of levels and current regimen:  Aminoglycoside levels are outside of goal range 36 hour level of 3.9    Has serum creatinine changed greater than 50% in the last 72 hours: No    Urine output:  unable to determine    Renal function: ARF on Dialysis    Plan  1. Continue to hold dose, repeat level in AM    3. Pharmacy will continue to follow and check levels  as appropriate in 1-3 Days    Ragini Marcial

## 2018-01-14 NOTE — PROGRESS NOTES
Nephrology Progress Note  01/14/2018         Assessment & Recommendations:   Murray Nicholson is a 63 year old male with a PMH of ESRD sec to likely DMII and HTN , CAD , HFrEF 20-25%, HLD, MGUS, SHEELA , AAA, Gout on prednisone and allopurinol, GERD, admitted with peritonitis diagnosed 12/29 outpatient and was treated with ceftazidime 1500mg intraperitoneal, presents with cloudy PD fluid , fatigue and abdominal pain.  Nephrology following for ESRD mangement and peritoneal infection.    1. Peritonitis with polymicrobial infection  Cultures have now been positive for pseudomonas, ecoli, strep, candida and Bacteroides implying intrabd source although abd CT and surgery assessments have been negative.   Grossly cloudy PD fluid on admission cell counts >4000 ->decreased to 457->109->874->2527->268 although last sample may have been diluted. Antibiotic coverage now broadened to ceftazidine 1.5 g ip qd, vanco ip to maintain trough >20 (received dose 1/12), gentamicin started 1/12 level 5.2 yesterday, and now metronidazole 500 mg po qid and micafungin 100 mg q24h.   Based on the data he has failed treatment (twice) and it is time to remove PD catheter and transition to hemodialysis for a few weeks.    Recommend:  1. Dr. Esteban Arvizu transplant surgery who placed catheter regarding removal of PD catheter and place tunneled dialysis catheter. Tentatively scheduled for Tuesday. Discussed with patient.  2. Continue ceftaz, vanco, gent, metronidazole and micafungin - redose vanc and gent based on levels  3. Cell count and PD fluid culture tonight     2. ESRD on PD since 06/2017  [facility] under Dr Mcpherson's care  PD prescription: 8hrs , 4 cycles , FV 2000, 2hr dwell , dextrose variable  UF on average 300-900  EDW 86Kg (190lbs)  Kt/V 1.5 PTA  Net UF about 700 ml  - PD today :8hrs, x4 cycles 2hr dwell with all 4.25% based on volume overload  - would liberalize diet since infection can cause protein loss + K is low - I would not  restrict him for now as it is more important to eat      Electrolytes  Hypokalemia  Repleted with potassium 40meq on 1/10. K 3.5 today      MBD  Phos 3.3, (10/17)  Calcium 8.5, albumin 1.8  On phoslo and Vit D      Anemia  Hb 9.6  On EPO 21332 units Qmonthly  Transfuse if <7  Iron replete per PTA labs       3. Influenza with pneumonia - bilateral infiltrates on chest CT    Recommendations were communicated to primary team in person.      Antonio Otoole MD  [phone]    Interval History :   Main issue is coughing but no SOB and O2 sats ok. Abdominal pain still present but not worse. No nausea, vomiting or diarrhea. PD fluid remains  cloudy. No fever or chills. No SOB.    Review of Systems:   I reviewed the following systems:  GI: Good appetite. - large BM Friday(was constipated)  Neuro:  -confusion  Constitutional:  -fever -chills  CV: -dyspnea +edema.  - chest pain.    Physical Exam:   I/O last 3 completed shifts:  In: 575 [P.O.:475; I.V.:100]  Out: 952 [Urine:250; Other:702]   /74  Pulse 92  Temp 96.8  F (36  C) (Oral)  Resp 16  Wt 90.6 kg (199 lb 12.8 oz)  SpO2 97%  BMI 29.51 kg/m2     GENERAL APPEARANCE: NAD  EYES:  No scleral icterus, pupils equal  HENT: mouth without ulcers or lesions  PULM: good ae bilat - bilateral crackles bases  CV: regular rhythm, KELLY present ? gallop     -JVD -  Not seen      -edema bilateral 2+  GI: soft, mildly tender, slightly distended, bowel sounds are present  INTEGUMENT: no cyanosis, no rash  NEURO:  no asterixis      Labs:   All labs reviewed by me  Electrolytes/Renal -   Recent Labs   Lab Test  01/14/18   0749  01/13/18   0956  01/12/18   0742   11/21/17   1239  11/15/17   0758  11/14/17   0645   NA  135  137  137   < >  142  144  141   POTASSIUM  3.5  3.9  3.5   < >  4.6  3.2*  3.7   CHLORIDE  96  97  98   < >  102  103  103   CO2  31  32  32   < >  29  30  27   BUN  39*  39*  38*   < >  66*  69*  76*   CR  8.18*  8.52*  8.27*   < >  7.65*  6.98*  6.85*    GLC  260*  211*  250*   < >  189*  294*  312*   TRINI  8.5  8.6  8.4*   < >  8.9  8.9  8.9   MAG   --   1.8  1.7   --   1.9  1.9  1.8   PHOS   --    --   3.3   --    --   5.0*  4.3    < > = values in this interval not displayed.       CBC -   Recent Labs   Lab Test  01/14/18   0749  01/13/18   0956  01/12/18   0742   WBC  5.6  5.4  4.1   HGB  9.6*  9.9*  9.8*   PLT  337  344  323       LFTs -   Recent Labs   Lab Test  01/07/18   1655  11/13/17   1709  08/02/17   1311  07/05/17   1204   ALKPHOS  76  104   --   98   BILITOTAL  0.4  0.8   --   0.5   ALT  24  26   --   15   AST  32  22   --   13   PROTTOTAL  6.0*  6.0*   --   6.2*   ALBUMIN  1.8*  2.3*  3.2*  2.7*       Iron Panel -   Recent Labs   Lab Test  07/19/17   1306  07/05/17   1204  06/21/17   1058   IRON  46  26*  69   IRONSAT  18  12*  29   ALBERTINA  369  542*  557*       Current Medications:    micafungin  100 mg Intravenous Q24H     metroNIDAZOLE  500 mg Oral Q6H JEAN     gentamicin (GARAMYCIN) place bui - receiving intermittent dosing  1 each Does not apply See Admin Instructions     oseltamivir  75 mg Oral BID     insulin aspart  1-10 Units Subcutaneous TID AC     insulin aspart  1-7 Units Subcutaneous At Bedtime     allopurinol  400 mg Oral Daily     hydrALAZINE  25 mg Oral TID     torsemide  100 mg Oral BID     predniSONE  5 mg Oral Daily     senna-docusate  1 tablet Oral BID     spironolactone  50 mg Oral Daily     amLODIPine  5 mg Oral Daily     aspirin  81 mg Oral Daily     atorvastatin  40 mg Oral Daily     fluticasone  1-2 spray Both Nostrils Daily     calcitRIOL  0.25 mcg Oral Daily     calcium acetate  667 mg Oral TID w/meals     omeprazole  20 mg Oral Daily     losartan  100 mg Oral Daily     metoprolol succinate  100 mg Oral Daily       peritoneal dialysis solutions ADULT       peritoneal dialysis solutions ADULT       peritoneal dialysis solutions ADULT       peritoneal dialysis solutions ADULT       peritoneal dialysis solutions ADULT        peritoneal dialysis solutions ADULT       Antonio Otoole MD

## 2018-01-14 NOTE — PLAN OF CARE
Problem: Patient Care Overview  Goal: Plan of Care/Patient Progress Review  Outcome: Therapy, progress towards functional goals is fair  HD8 admitted with peritonitis. A&Ox4 VSS on room air, pain is controlled with PO tramadol and tylenol. Up indpendent, UO is low, not anuric. Passing gas, no BM today. PD access clamped. PIV SL. BG checks AC/Apply heat to the sinuses to relieve congestion and discomfort.. On a mod carb diet. Plan is to continue PD, antx. Hemodialysis access needs to be established, then PD can be removed.     Pt would like bedside report

## 2018-01-14 NOTE — PLAN OF CARE
VSS. Up ad edith. Pain managed with prn tylenol and tramadol. PIV saline locked. IV micafungin and oral flagyl given. Cross-cover notified of critical vanco level 28.8 (which is improved from 37.3 yesterday). Took a shower. Tolerating diet. BG checks 179 and 246. Voided a small amount. No BM. Lungs diminished/coarse. Occasional cough, not productive. Contact and droplet isolation precautions. Peritoneal dialysis started around 2130. Peritoneal fluid sample to be collected around 2315 and sent to lab. Continue with POC.

## 2018-01-14 NOTE — PROGRESS NOTES
[facility], [place]   General Infectious Disease Daily Note     [facility] GENERAL ID SERVICE: ONGOING CONSULTATION    Patient:  Murray Nicholson, Date of birth 1955, Medical record number [id number]  Date of Visit:  January 14, 2018         Assessment and Recommendations:   Problem List:  1. Peritonitis, multi-organism  2. Likely colonized peritoneal dialysis access catheter  3. Influenza A via PCR, on treatment  4. ESRD, previously undergoing PD  5. DM II  6. Bicuspid Aortic Valve with accompanying ascending aortic aneurysm  7. GERD, on PPI  8. Anemia of chronic disease  9. Gout  10. MGUS  11. CHF, EF ~20%    Recommendations:  1. Agree with plan to remove peritoneal dialysis catheter    2. Continue Ceftazidime for previously identified organisms (E coli, Streptococcus spp., Pseudomonas aeruginosa) via intraperitoneal administration, although can convert to IV once catheter is ultimately removed, dosed appropriately for ESRD   Plan for 21 days total therapy, to end 1/19/2018    3. Continue Micafungin and Metronidazole for coverage of recently grown Candida glabrata and Bacteroides caccae   Anticipate full 14 days total course (end 1/30/2018)     4. Pending Fluconazole sensitivities for the yeast in progress, it may be feasible to transition to high-dose Fluconazole    5. Complete 5 days total Oseltamivir therapy for Influenza    Discussion:  1/14/2018:  Based on the collection of organisms identified, and the multiplicity of both Gram negatives in accompaniment with a yeast, would opt to remove the peritoneal dialysis catheter, as likely colonized with biofilm. The presence of the yeast and anaerobe may explain the lack of improvement and reappearance of his symptoms following his initial treatment with ceftazidime.     Improving from a respiratory standpoint, plan to complete 5 days total therapy for influenza.     We appreciate this consultation.    Discussed with Staff, Dr. Jennifer Pena  DO Godwin, MPH  Dept of Infectious Diseases  Pg [phone]    ID Staff Assessment and Plan:   Patient was seen and examined by me with the ID Fellow. The note above reflects our joint assessment and recommendations. I have reviewed the medical record, labs, imaging, vitals signs and have discussed the patient with the ID fellow in detail.   Rosa Rodriguez MD  ID staff physician  Pager [phone]        Interval History:       Overnight events:  No acute overnight events  Positive influenza A via PCR  Effluent fluid in PD noted to be cloudy  Pending replacement of PD cath, with interim HD temp line placement  Interval growth noted of Bacteroides caccae and Candida glabrata    Seen and examined. Patient overall feels okay, coughing abundantly, but improves with speaking, only in paroxysms now at night, albuterol helps, wants to now how often he can use it. He still has belly pain. Has many questions about the organisms, and whether penicillin would have helped (he is allergic). Denies interval change in belly pain or worsening. Denies diarrhea.          Review of Systems:     As above in HPI; all others reviewed and negative.      micafungin  100 mg Intravenous Q24H     metroNIDAZOLE  500 mg Oral Q6H JEAN     gentamicin (GARAMYCIN) place bui - receiving intermittent dosing  1 each Does not apply See Admin Instructions     oseltamivir  75 mg Oral BID     insulin aspart  1-10 Units Subcutaneous TID AC     insulin aspart  1-7 Units Subcutaneous At Bedtime     allopurinol  400 mg Oral Daily     hydrALAZINE  25 mg Oral TID     torsemide  100 mg Oral BID     predniSONE  5 mg Oral Daily     senna-docusate  1 tablet Oral BID     spironolactone  50 mg Oral Daily     amLODIPine  5 mg Oral Daily     aspirin  81 mg Oral Daily     atorvastatin  40 mg Oral Daily     fluticasone  1-2 spray Both Nostrils Daily     calcitRIOL  0.25 mcg Oral Daily     calcium acetate  667 mg Oral TID w/meals     omeprazole  20 mg Oral Daily     losartan   100 mg Oral Daily     metoprolol succinate  100 mg Oral Daily            Physical Exam:   Ranges for vital signs:  Temp:  [96.7  F (35.9  C)-98.2  F (36.8  C)] 96.8  F (36  C)  Pulse:  [92] 92  Heart Rate:  [] 96  Resp:  [16-18] 16  BP: (116-134)/(59-76) 128/74  SpO2:  [97 %-99 %] 97 %    Intake/Output Summary (Last 24 hours) at 01/14/18 1016  Last data filed at 01/14/18 0900   Gross per 24 hour   Intake              935 ml   Output             1027 ml   Net              -92 ml     Exam:  GENERAL:  well-developed, well-nourished, sitting upright in chair in no acute distress.   ENT:  Head is normocephalic, atraumatic. Oropharynx is moist without exudates or ulcers.  EYES:  Eyes have anicteric sclerae.    NECK:  Supple.  LUNGS:  Clear to auscultation.  CARDIOVASCULAR:  Regular rate and rhythm with no murmurs, gallops or rubs.  ABDOMEN:  Distended, mildly diffusely tender, PD cath site without significant irritation or redness  EXT: Extremities warm and without edema.  SKIN:  No acute rashes.  Line is in place without any surrounding erythema.  NEUROLOGIC:  Grossly nonfocal.         Laboratory Data:     Inflammatory Markers    Recent Labs   Lab Test  07/05/17   1204  05/31/17   1111  05/17/17   1214  04/13/17   0651  04/12/17   0935  04/11/17   1319  01/13/16   1337   SED   --    --    --    --    --    --   80*   CRP  35.4*  15.9*  39.3*  270.0*  200.0*  130.0*   --        Hematology Studies  Recent Labs   Lab Test  01/14/18   0749  01/13/18   0956  01/12/18   0742  01/11/18   0945  01/10/18   1015  01/09/18   0802  01/08/18   0815  01/07/18   1655   11/13/17   1709   07/05/17   1204  06/26/17   0140   WBC  5.6  5.4  4.1  3.9*  3.3*  3.1*  4.4  6.3   < >  9.6   < >  11.9*  8.3   ANEU   --    --    --    --   1.9   --   3.3  5.3   --   8.4*   --   10.9*  7.6   AEOS   --    --    --    --   0.3   --   0.2  0.1   --   0.1   --   0.0  0.1   HGB  9.6*  9.9*  9.8*  9.5*  9.6*  9.2*  9.0*  10.0*   < >  8.9*   < >   8.9*  9.4*   MCV  99  99  99  99  99  99  99  99   < >  101*   < >  91  94   PLT  337  344  323  307  250  269  256  277   < >  277   < >  267  203    < > = values in this interval not displayed.       Immune Globulin Studies  Recent Labs   Lab Test  05/19/15   1000   IGG  1380   IGM  108   IGA  203       Metabolic Studies   Recent Labs   Lab Test  01/14/18   0749  01/13/18   0956  01/12/18   0742  01/11/18   0945  01/10/18   1015   NA  135  137  137  140  138   POTASSIUM  3.5  3.9  3.5  3.5  3.1*   CHLORIDE  96  97  98  100  98   CO2  31  32  32  32  31   BUN  39*  39*  38*  40*  39*   CR  8.18*  8.52*  8.27*  8.66*  8.48*   GFRESTIMATED  7*  6*  7*  6*  6*       Hepatic Studies  Recent Labs   Lab Test  01/07/18   1655  11/13/17   1709  08/02/17   1311  07/05/17   1204  06/21/17   1058  05/31/17   1111  05/17/17   1214  04/28/17   0842   BILITOTAL  0.4  0.8   --   0.5   --   0.5  0.6  0.5   ALKPHOS  76  104   --   98   --   97  86  129   ALBUMIN  1.8*  2.3*  3.2*  2.7*  2.9*  2.7*  2.6*  2.7*   AST  32  22   --   13   --   18  16  11   ALT  24  26   --   15   --   16  12  13       Thyroid Studies    Recent Labs   Lab Test  04/12/17   0935  04/09/15   0900   TSH  1.26  3.47       Microbiology:  Culture Micro   Date Value Ref Range Status   01/09/2018 Heavy growth  Normal tiffany    Final   01/07/2018 (A)  Final    On day 4, isolated in broth only:  Candida glabrata     01/07/2018 (A)  Final    On day 4, isolated in broth only:  Bacteroides caccae     01/07/2018   Final    Critical Value/Significant Value, preliminary result only, called to and read back by  Alverto Vasquez RN 7C 1.11.18 @0907 AV     01/07/2018 Susceptibility testing not routinely done  Final   01/07/2018   Final    Sens.on Candida Glabrata and Bacteroides Caccae per  [id number]   01/07/2018 No growth  Final   01/07/2018 No growth  Final   06/26/2017 No growth  Final   06/26/2017 No growth  Final   04/12/2017 No growth  Final   04/12/2017 No  growth  Final   12/30/2011 No Beta Streptococcus isolated  Final   01/23/2011 No Beta Streptococcus isolated  Final   10/28/2005 No Beta Streptococcus isolated  Final   02/12/2005 No Beta Streptococcus isolated  Final   12/22/2004 No Beta Streptococcus isolated  Final       Urine Studies    Recent Labs   Lab Test  05/03/17   1344  04/08/17   1720  08/10/15   0758  05/05/15   1110  07/03/13   1410   LEUKEST  Negative  Negative  Negative  Negative  Trace*   WBCU  1  7*  1  2  2       Vancomycin Levels  Recent Labs   Lab Test  01/13/18   1440  01/12/18   1402  01/12/18   0742   VANCOMYCIN  28.8*  37.3*  32.2*       Serostatus:  No results found for: CMVG, CMIGG, CMIG, CMIM, CMVIGM, CMVM, CMLTX, HSVG1, HSVG2, HSVTP1, [id number], HS12M, HS12GR, HS1GR, HS2GR, HERPES, HSIM, HSIG, HSIGR, HSVIGMAB, HSVG1, VARICZOSAB, EBVIGG, EBIGG, EBVAGN, ND2542  No results found for: EBIG2, EBIGM, TOXG  No lab results found.    Invalid input(s): HSV12, VZVG, HLF727    Toxoplasma Testing  No lab results found.    Invalid input(s): TOXPL, TOXABM, TOXPCR    Virology:  CMV viral loads  No lab results found.  No results found for: CMQNT, CMVQ  EBV viral loads   No lab results found.  No results found for: EBVDN, EBRES, EBVDN, EBVSP, EBVPC, EBVPCR  BK viral loads No lab results found.    Invalid input(s): BKDN, BKVPC, BKVPCR  HSV testing  No lab results found.    Hepatitis B Testing Recent Labs   Lab Test  11/13/17   2245  06/07/17   1445  08/10/15   0729   HBCAB   --   Nonreactive  Nonreactive   HEPBANG  Nonreactive  Nonreactive  Nonreactive     Hepatitis C Testing   Hepatitis C Antibody   Date Value Ref Range Status   08/10/2015  NR Final    Nonreactive   Assay performance characteristics have not been established for newborns,   infants, and children     01/07/2014 Negative NEG Final     Respiratory Virus Testing    No results found for: RS, FLUAG    No results found for this or any previous visit (from the past 24 hour(s)).

## 2018-01-14 NOTE — PROGRESS NOTES
[facility], [place]    Internal Medicine Progress Note - [facility] Service    Main Plans for Today   - Continue current antimicrobials  - Coordinate with Surgery team for PD removal (per surg, on Tuesday)    Assessment & Plan   Murray Nicholson is a 63 year old male with history of T2DM, MGUS, HLD, HTN, ESRD (likely 2/2 DM,HTN) on PD, HFrEF (EF - 20-25% with severe global hypokinesis), anemia of chronic disease and gout on allopurinol and prednisone who is admitted for further treatment of bacterial peritonitis that has failed outpatient treatment. Patient hemodynamically stable.      # Bacterial peritonitis s/p treatment failure (9 day intraperitoneal course of Ceftazidime).  # ESRD 2/2 T2Dm, HTN - on PD  Patient with abdominal pain, cloudy dialysate and s/p 9 day course of intraperitoneal ceftazidime for suspected peritonitis. Cultures were done at [facility]. Per Nephrology note, peritoneal fluid notable for E.Coli,  Pseudomonas and Streptococcus. Now growing candida glabrata and bacteroides caccae on day 4. WBC in peritoneal fluid originally improving, but repeat testing 1/12 showed WBC 2527. Repeat CT showed no significant changes. Repeat peritoneal fluid on 1/13 showed WBC down to 400s.  - Surgery consulted for removal of PD line  - ID consulted, appreciate recs  - Patient will continue on ceftazidime (IP), vancomycin, and gentamicin, micafungin for candida glabrata and metronidazole for bacteroides caccae. Sensitivities pending for candida.   - Monitor vital signs closely  - Nephrology following, appreciate recs  - Continue PTA calcitriol, and phoslo    # Influenza  CXR on 1/11 showed right LL opacity.  - Continue flonase  - Tessalon perles  - Tamiflu 75mg BID for 5 days    # Hypokalemia  - Replace PRN     # T2DM  Last A1C - 6.4 in 6/2017, but 9.1 on admission. On Glipizide PTA and no insulin. BG has been elevated this admission likely due to acute infection, but based on his A1c his BG  hasn't been well controlled in recent months.  - Hold Glipizide due to hypoglycemia risk. Will resume at discharge  - High dose SSI  - Glucose checks  - Hypoglycemia protocol     #HFrEF (EF 20-25% on 4/2017 TTE) - Stage C, NYHA class III  #CAD  #HTN  #HLD  #Ascending aortic aneurysm  No cardiorespiratory symptoms on presentation. On Losartan, Toprol and ASA. Last Echo 4/2017 with  coronary angiogram yet to be done, although ordered. Ascending aortic aneurysm 4.8cm as at last check 8/2/2017.   - Continue PTA ASA 81mg daily  - Continue PTA Losartan 100mg daily  - Continue  PTA Toprol XL 100mg daily  - Continue PTA Torsemide 100mg BID (was on bumex but currently on torsemide which is a recent change)  - Continue PTA Atorvastatin 40mg daily  - Continue PTA Amlodipine 5 mg daily  - CORE clinic referral on d/c  - Continue  PTA spironolactone 50mg daily   - Restart PTA Hydralazine at a lower dose 25mg TID (50mg TID at home)  - Hold Imdur     CHRONIC PROBLEMS  # Gout. Stable. Continue Allopurinol, prednisone (patient reports he gets frequent flares)  # MGUS. Stable.  # Anemia of chronic disease: Stable    # GERD. Stable. On Omeprazole      Medications held:  Imdur  Glipizide    Diet: Combination Diet Renal Diet; 2611-0738 Calories: Moderate Consistent CHO (4-6 CHO units/meal)  Fluids: none  DVT Prophylaxis: Pneumatic Compression Devices, ambulate   Code Status: Full Code    Disposition Plan   Expected discharge: TBD, recommended to prior living arrangement once adequate pain management/ tolerating PO medications and antibiotic plan established.     Entered: Breanne Jones 01/14/2018, 3:00 PM   Information in the above section will display in the discharge planner report.      The patient's care was discussed with the Bedside Nurse and Patient.    Beranne JONES MD  Text page  Hospitalist  [facility]    Please see sticky note for cross cover information    Interval History   No acute events overnight.   Pt had some abd pain  overnight  Denies any change in breathing  No chest pain  Has been having BMs after bowel regimen    Physical Exam   /72  Pulse 92  Temp 96.8  F (36  C) (Oral)  Resp 16  Wt 91.3 kg (201 lb 4.8 oz)  SpO2 97%  BMI 29.73 kg/m2  General Appearance: Pleasant man in NAD.   Respiratory: Mild crackles at bilateral bases. No wheezing or crackles.   Cardiovascular: RRR. No murmurs or crackles.   GI: Soft, non-tender, non-distended. Normoactive bowel sounds.  Skin: no rashes or concerning lesions, no jaundice  Neuro: Alert and oriented x3. CNII-XII grossly intact.       Data   Medications     peritoneal dialysis solutions ADULT       peritoneal dialysis solutions ADULT       peritoneal dialysis solutions ADULT       peritoneal dialysis solutions ADULT       peritoneal dialysis solutions ADULT       peritoneal dialysis solutions ADULT       peritoneal dialysis solutions ADULT       peritoneal dialysis solutions ADULT         micafungin  100 mg Intravenous Q24H     metroNIDAZOLE  500 mg Oral Q6H JEAN     gentamicin (GARAMYCIN) place bui - receiving intermittent dosing  1 each Does not apply See Admin Instructions     oseltamivir  75 mg Oral BID     insulin aspart  1-10 Units Subcutaneous TID AC     insulin aspart  1-7 Units Subcutaneous At Bedtime     allopurinol  400 mg Oral Daily     hydrALAZINE  25 mg Oral TID     torsemide  100 mg Oral BID     predniSONE  5 mg Oral Daily     senna-docusate  1 tablet Oral BID     spironolactone  50 mg Oral Daily     amLODIPine  5 mg Oral Daily     aspirin  81 mg Oral Daily     atorvastatin  40 mg Oral Daily     fluticasone  1-2 spray Both Nostrils Daily     calcitRIOL  0.25 mcg Oral Daily     calcium acetate  667 mg Oral TID w/meals     omeprazole  20 mg Oral Daily     losartan  100 mg Oral Daily     metoprolol succinate  100 mg Oral Daily     Data     Recent Labs  Lab 01/14/18  0749 01/13/18  0956 01/12/18  0742  01/07/18  1655   WBC 5.6 5.4 4.1  < > 6.3   HGB 9.6* 9.9* 9.8*  <  > 10.0*   MCV 99 99 99  < > 99    344 323  < > 277    137 137  < > 137   POTASSIUM 3.5 3.9 3.5  < > 3.7   CHLORIDE 96 97 98  < > 97   CO2 31 32 32  < > 28   BUN 39* 39* 38*  < > 42*   CR 8.18* 8.52* 8.27*  < > 7.10*   ANIONGAP 8 8 7  < > 12   TRINI 8.5 8.6 8.4*  < > 8.2*   * 211* 250*  < > 171*   ALBUMIN  --   --   --   --  1.8*   PROTTOTAL  --   --   --   --  6.0*   BILITOTAL  --   --   --   --  0.4   ALKPHOS  --   --   --   --  76   ALT  --   --   --   --  24   AST  --   --   --   --  32   < > = values in this interval not displayed.

## 2018-01-15 ENCOUNTER — ANESTHESIA (OUTPATIENT)
Dept: SURGERY | Facility: CLINIC | Age: 63
DRG: 981 | End: 2018-01-15
Payer: COMMERCIAL

## 2018-01-15 ENCOUNTER — ANESTHESIA EVENT (OUTPATIENT)
Dept: SURGERY | Facility: CLINIC | Age: 63
DRG: 981 | End: 2018-01-15
Payer: COMMERCIAL

## 2018-01-15 LAB
APPEARANCE FLD: NORMAL
BASOPHILS NFR FLD MANUAL: 1 %
COLOR FLD: COLORLESS
GENTAMICIN SERPL-MCNC: 2.6 MG/L
GLUCOSE BLDC GLUCOMTR-MCNC: 137 MG/DL (ref 70–99)
GLUCOSE BLDC GLUCOMTR-MCNC: 137 MG/DL (ref 70–99)
GLUCOSE BLDC GLUCOMTR-MCNC: 143 MG/DL (ref 70–99)
GLUCOSE BLDC GLUCOMTR-MCNC: 257 MG/DL (ref 70–99)
GLUCOSE BLDC GLUCOMTR-MCNC: 319 MG/DL (ref 70–99)
GRAM STN SPEC: NORMAL
GRAM STN SPEC: NORMAL
LYMPHOCYTES NFR FLD MANUAL: 2 %
MONOS+MACROS NFR FLD MANUAL: 6 %
NEUTS BAND NFR FLD MANUAL: 91 %
RBC # FLD: NORMAL /UL
SPECIMEN SOURCE FLD: NORMAL
SPECIMEN SOURCE: NORMAL
SPECIMEN SOURCE: NORMAL
VANCOMYCIN SERPL-MCNC: 22.5 MG/L
WBC # FLD AUTO: 4256 /UL
YEAST SPEC QL CULT: NORMAL

## 2018-01-15 PROCEDURE — 88300 SURGICAL PATH GROSS: CPT | Performed by: SURGERY

## 2018-01-15 PROCEDURE — 87102 FUNGUS ISOLATION CULTURE: CPT | Performed by: STUDENT IN AN ORGANIZED HEALTH CARE EDUCATION/TRAINING PROGRAM

## 2018-01-15 PROCEDURE — 25000128 H RX IP 250 OP 636: Performed by: STUDENT IN AN ORGANIZED HEALTH CARE EDUCATION/TRAINING PROGRAM

## 2018-01-15 PROCEDURE — 25000131 ZZH RX MED GY IP 250 OP 636 PS 637: Performed by: INTERNAL MEDICINE

## 2018-01-15 PROCEDURE — 25000132 ZZH RX MED GY IP 250 OP 250 PS 637: Performed by: STUDENT IN AN ORGANIZED HEALTH CARE EDUCATION/TRAINING PROGRAM

## 2018-01-15 PROCEDURE — 87102 FUNGUS ISOLATION CULTURE: CPT | Performed by: SURGERY

## 2018-01-15 PROCEDURE — 12000001 ZZH R&B MED SURG/OB UMMC

## 2018-01-15 PROCEDURE — 0WPG33Z REMOVAL OF INFUSION DEVICE FROM PERITONEAL CAVITY, PERCUTANEOUS APPROACH: ICD-10-PCS | Performed by: SURGERY

## 2018-01-15 PROCEDURE — 87106 FUNGI IDENTIFICATION YEAST: CPT | Performed by: SURGERY

## 2018-01-15 PROCEDURE — 89051 BODY FLUID CELL COUNT: CPT | Performed by: INTERNAL MEDICINE

## 2018-01-15 PROCEDURE — 87070 CULTURE OTHR SPECIMN AEROBIC: CPT | Performed by: SURGERY

## 2018-01-15 PROCEDURE — 37000008 ZZH ANESTHESIA TECHNICAL FEE, 1ST 30 MIN: Performed by: SURGERY

## 2018-01-15 PROCEDURE — 87205 SMEAR GRAM STAIN: CPT | Performed by: SURGERY

## 2018-01-15 PROCEDURE — 87186 SC STD MICRODIL/AGAR DIL: CPT | Performed by: SURGERY

## 2018-01-15 PROCEDURE — 25000132 ZZH RX MED GY IP 250 OP 250 PS 637: Performed by: INTERNAL MEDICINE

## 2018-01-15 PROCEDURE — 27210995 ZZH RX 272: Performed by: STUDENT IN AN ORGANIZED HEALTH CARE EDUCATION/TRAINING PROGRAM

## 2018-01-15 PROCEDURE — 40000171 ZZH STATISTIC PRE-PROCEDURE ASSESSMENT III: Performed by: SURGERY

## 2018-01-15 PROCEDURE — 71000014 ZZH RECOVERY PHASE 1 LEVEL 2 FIRST HR: Performed by: SURGERY

## 2018-01-15 PROCEDURE — 36000051 ZZH SURGERY LEVEL 2 1ST 30 MIN - UMMC: Performed by: SURGERY

## 2018-01-15 PROCEDURE — 25000125 ZZHC RX 250: Performed by: NURSE ANESTHETIST, CERTIFIED REGISTERED

## 2018-01-15 PROCEDURE — 25000132 ZZH RX MED GY IP 250 OP 250 PS 637: Performed by: HOSPITALIST

## 2018-01-15 PROCEDURE — 27210794 ZZH OR GENERAL SUPPLY STERILE: Performed by: SURGERY

## 2018-01-15 PROCEDURE — 25000128 H RX IP 250 OP 636: Performed by: NURSE ANESTHETIST, CERTIFIED REGISTERED

## 2018-01-15 PROCEDURE — 80170 ASSAY OF GENTAMICIN: CPT | Performed by: INTERNAL MEDICINE

## 2018-01-15 PROCEDURE — 25000565 ZZH ISOFLURANE, EA 15 MIN: Performed by: SURGERY

## 2018-01-15 PROCEDURE — 71000015 ZZH RECOVERY PHASE 1 LEVEL 2 EA ADDTL HR: Performed by: SURGERY

## 2018-01-15 PROCEDURE — 36000053 ZZH SURGERY LEVEL 2 EA 15 ADDTL MIN - UMMC: Performed by: SURGERY

## 2018-01-15 PROCEDURE — 00000146 ZZHCL STATISTIC GLUCOSE BY METER IP

## 2018-01-15 PROCEDURE — 87070 CULTURE OTHR SPECIMN AEROBIC: CPT | Performed by: STUDENT IN AN ORGANIZED HEALTH CARE EDUCATION/TRAINING PROGRAM

## 2018-01-15 PROCEDURE — C9399 UNCLASSIFIED DRUGS OR BIOLOG: HCPCS | Performed by: NURSE ANESTHETIST, CERTIFIED REGISTERED

## 2018-01-15 PROCEDURE — 80202 ASSAY OF VANCOMYCIN: CPT | Performed by: INTERNAL MEDICINE

## 2018-01-15 PROCEDURE — 37000009 ZZH ANESTHESIA TECHNICAL FEE, EACH ADDTL 15 MIN: Performed by: SURGERY

## 2018-01-15 PROCEDURE — 87106 FUNGI IDENTIFICATION YEAST: CPT | Performed by: STUDENT IN AN ORGANIZED HEALTH CARE EDUCATION/TRAINING PROGRAM

## 2018-01-15 PROCEDURE — 87077 CULTURE AEROBIC IDENTIFY: CPT | Performed by: SURGERY

## 2018-01-15 PROCEDURE — 36415 COLL VENOUS BLD VENIPUNCTURE: CPT | Performed by: INTERNAL MEDICINE

## 2018-01-15 PROCEDURE — 87075 CULTR BACTERIA EXCEPT BLOOD: CPT | Performed by: SURGERY

## 2018-01-15 RX ORDER — CLINDAMYCIN PHOSPHATE 900 MG/50ML
900 INJECTION, SOLUTION INTRAVENOUS
Status: CANCELLED | OUTPATIENT
Start: 2018-01-16

## 2018-01-15 RX ORDER — PROPOFOL 10 MG/ML
INJECTION, EMULSION INTRAVENOUS PRN
Status: DISCONTINUED | OUTPATIENT
Start: 2018-01-15 | End: 2018-01-15

## 2018-01-15 RX ORDER — ACETAMINOPHEN 325 MG/1
975 TABLET ORAL ONCE
Status: COMPLETED | OUTPATIENT
Start: 2018-01-15 | End: 2018-01-15

## 2018-01-15 RX ORDER — FENTANYL CITRATE 50 UG/ML
INJECTION, SOLUTION INTRAMUSCULAR; INTRAVENOUS PRN
Status: DISCONTINUED | OUTPATIENT
Start: 2018-01-15 | End: 2018-01-15

## 2018-01-15 RX ORDER — SODIUM CHLORIDE 9 MG/ML
INJECTION, SOLUTION INTRAVENOUS CONTINUOUS PRN
Status: DISCONTINUED | OUTPATIENT
Start: 2018-01-15 | End: 2018-01-15

## 2018-01-15 RX ORDER — LIDOCAINE HYDROCHLORIDE 20 MG/ML
INJECTION, SOLUTION INFILTRATION; PERINEURAL PRN
Status: DISCONTINUED | OUTPATIENT
Start: 2018-01-15 | End: 2018-01-15

## 2018-01-15 RX ORDER — ONDANSETRON 2 MG/ML
4 INJECTION INTRAMUSCULAR; INTRAVENOUS EVERY 30 MIN PRN
Status: DISCONTINUED | OUTPATIENT
Start: 2018-01-15 | End: 2018-01-15 | Stop reason: HOSPADM

## 2018-01-15 RX ORDER — FENTANYL CITRATE 50 UG/ML
25-50 INJECTION, SOLUTION INTRAMUSCULAR; INTRAVENOUS EVERY 5 MIN PRN
Status: DISCONTINUED | OUTPATIENT
Start: 2018-01-15 | End: 2018-01-15 | Stop reason: HOSPADM

## 2018-01-15 RX ORDER — SODIUM CHLORIDE, SODIUM LACTATE, POTASSIUM CHLORIDE, CALCIUM CHLORIDE 600; 310; 30; 20 MG/100ML; MG/100ML; MG/100ML; MG/100ML
INJECTION, SOLUTION INTRAVENOUS CONTINUOUS
Status: DISCONTINUED | OUTPATIENT
Start: 2018-01-15 | End: 2018-01-15 | Stop reason: HOSPADM

## 2018-01-15 RX ORDER — ONDANSETRON 4 MG/1
4 TABLET, ORALLY DISINTEGRATING ORAL EVERY 30 MIN PRN
Status: DISCONTINUED | OUTPATIENT
Start: 2018-01-15 | End: 2018-01-15 | Stop reason: HOSPADM

## 2018-01-15 RX ORDER — ONDANSETRON 2 MG/ML
INJECTION INTRAMUSCULAR; INTRAVENOUS PRN
Status: DISCONTINUED | OUTPATIENT
Start: 2018-01-15 | End: 2018-01-15

## 2018-01-15 RX ORDER — NALOXONE HYDROCHLORIDE 0.4 MG/ML
.1-.4 INJECTION, SOLUTION INTRAMUSCULAR; INTRAVENOUS; SUBCUTANEOUS
Status: DISCONTINUED | OUTPATIENT
Start: 2018-01-15 | End: 2018-01-16

## 2018-01-15 RX ORDER — CEFTAZIDIME 1 G/1
1 INJECTION, POWDER, FOR SOLUTION INTRAMUSCULAR; INTRAVENOUS EVERY 24 HOURS
Status: COMPLETED | OUTPATIENT
Start: 2018-01-15 | End: 2018-01-19

## 2018-01-15 RX ADMIN — PROPOFOL 50 MG: 10 INJECTION, EMULSION INTRAVENOUS at 17:23

## 2018-01-15 RX ADMIN — HYDRALAZINE HYDROCHLORIDE 25 MG: 25 TABLET ORAL at 07:45

## 2018-01-15 RX ADMIN — CALCIUM ACETATE 667 MG: 667 CAPSULE ORAL at 07:45

## 2018-01-15 RX ADMIN — METRONIDAZOLE 500 MG: 500 TABLET ORAL at 19:49

## 2018-01-15 RX ADMIN — CALCITRIOL 0.25 MCG: 0.25 CAPSULE, LIQUID FILLED ORAL at 07:43

## 2018-01-15 RX ADMIN — FLUTICASONE PROPIONATE 1 SPRAY: 50 SPRAY, METERED NASAL at 07:46

## 2018-01-15 RX ADMIN — SENNOSIDES AND DOCUSATE SODIUM 1 TABLET: 8.6; 5 TABLET ORAL at 07:45

## 2018-01-15 RX ADMIN — AMLODIPINE BESYLATE 5 MG: 5 TABLET ORAL at 07:43

## 2018-01-15 RX ADMIN — METOPROLOL SUCCINATE 100 MG: 100 TABLET, EXTENDED RELEASE ORAL at 07:45

## 2018-01-15 RX ADMIN — MIDAZOLAM 1 MG: 1 INJECTION INTRAMUSCULAR; INTRAVENOUS at 16:00

## 2018-01-15 RX ADMIN — DOCUSATE SODIUM 100 MG: 100 CAPSULE, LIQUID FILLED ORAL at 11:58

## 2018-01-15 RX ADMIN — SPIRONOLACTONE 50 MG: 25 TABLET ORAL at 07:43

## 2018-01-15 RX ADMIN — HYDRALAZINE HYDROCHLORIDE 25 MG: 25 TABLET ORAL at 21:20

## 2018-01-15 RX ADMIN — SUGAMMADEX 200 MG: 100 INJECTION, SOLUTION INTRAVENOUS at 17:18

## 2018-01-15 RX ADMIN — METRONIDAZOLE 500 MG: 500 TABLET ORAL at 14:04

## 2018-01-15 RX ADMIN — OMEPRAZOLE 20 MG: 20 CAPSULE, DELAYED RELEASE ORAL at 07:45

## 2018-01-15 RX ADMIN — CEFTAZIDIME 1 G: 2 INJECTION, POWDER, FOR SOLUTION INTRAVENOUS at 21:19

## 2018-01-15 RX ADMIN — SENNOSIDES AND DOCUSATE SODIUM 1 TABLET: 8.6; 5 TABLET ORAL at 19:49

## 2018-01-15 RX ADMIN — OSELTAMIVIR PHOSPHATE 75 MG: 75 CAPSULE ORAL at 07:45

## 2018-01-15 RX ADMIN — SODIUM CHLORIDE: 9 INJECTION, SOLUTION INTRAVENOUS at 16:00

## 2018-01-15 RX ADMIN — INSULIN ASPART 5 UNITS: 100 INJECTION, SOLUTION INTRAVENOUS; SUBCUTANEOUS at 07:46

## 2018-01-15 RX ADMIN — LOSARTAN POTASSIUM 100 MG: 50 TABLET ORAL at 07:45

## 2018-01-15 RX ADMIN — ATORVASTATIN CALCIUM 40 MG: 40 TABLET, FILM COATED ORAL at 19:51

## 2018-01-15 RX ADMIN — ACETAMINOPHEN 650 MG: 325 TABLET ORAL at 00:12

## 2018-01-15 RX ADMIN — TRAMADOL HYDROCHLORIDE 50 MG: 50 TABLET, COATED ORAL at 19:49

## 2018-01-15 RX ADMIN — ALLOPURINOL 400 MG: 300 TABLET ORAL at 07:45

## 2018-01-15 RX ADMIN — TORSEMIDE 100 MG: 100 TABLET ORAL at 07:43

## 2018-01-15 RX ADMIN — PHENYLEPHRINE HYDROCHLORIDE 100 MCG: 10 INJECTION, SOLUTION INTRAMUSCULAR; INTRAVENOUS; SUBCUTANEOUS at 16:36

## 2018-01-15 RX ADMIN — ACETAMINOPHEN 650 MG: 325 TABLET ORAL at 19:49

## 2018-01-15 RX ADMIN — TRAMADOL HYDROCHLORIDE 50 MG: 50 TABLET, COATED ORAL at 02:13

## 2018-01-15 RX ADMIN — ROCURONIUM BROMIDE 50 MG: 10 INJECTION INTRAVENOUS at 16:35

## 2018-01-15 RX ADMIN — TORSEMIDE 100 MG: 100 TABLET ORAL at 21:20

## 2018-01-15 RX ADMIN — ACETAMINOPHEN 975 MG: 325 TABLET ORAL at 16:02

## 2018-01-15 RX ADMIN — ASPIRIN 81 MG CHEWABLE TABLET 81 MG: 81 TABLET CHEWABLE at 07:45

## 2018-01-15 RX ADMIN — METRONIDAZOLE 500 MG: 500 TABLET ORAL at 02:13

## 2018-01-15 RX ADMIN — PROPOFOL 150 MG: 10 INJECTION, EMULSION INTRAVENOUS at 16:35

## 2018-01-15 RX ADMIN — MIDAZOLAM 1 MG: 1 INJECTION INTRAMUSCULAR; INTRAVENOUS at 16:15

## 2018-01-15 RX ADMIN — MICAFUNGIN SODIUM 100 MG: 10 INJECTION, POWDER, LYOPHILIZED, FOR SOLUTION INTRAVENOUS at 21:13

## 2018-01-15 RX ADMIN — FENTANYL CITRATE 50 MCG: 50 INJECTION, SOLUTION INTRAMUSCULAR; INTRAVENOUS at 16:45

## 2018-01-15 RX ADMIN — METRONIDAZOLE 500 MG: 500 TABLET ORAL at 07:43

## 2018-01-15 RX ADMIN — ONDANSETRON 4 MG: 2 INJECTION INTRAMUSCULAR; INTRAVENOUS at 17:07

## 2018-01-15 RX ADMIN — FENTANYL CITRATE 50 MCG: 50 INJECTION, SOLUTION INTRAMUSCULAR; INTRAVENOUS at 16:15

## 2018-01-15 RX ADMIN — LIDOCAINE HYDROCHLORIDE 100 MG: 20 INJECTION, SOLUTION INFILTRATION; PERINEURAL at 16:35

## 2018-01-15 RX ADMIN — PREDNISONE 5 MG: 2.5 TABLET ORAL at 07:45

## 2018-01-15 ASSESSMENT — PAIN DESCRIPTION - DESCRIPTORS
DESCRIPTORS: DISCOMFORT

## 2018-01-15 NOTE — PROGRESS NOTES
Nephrology Progress Note  01/15/2018         Assessment & Recommendations:   Murray Nicholson is a 63 year old male with a PMH of ESRD sec to likely DMII and HTN , CAD , HFrEF 20-25%, HLD, MGUS, SHEELA , AAA, Gout on prednisone and allopurinol, GERD, admitted with peritonitis diagnosed 12/29 outpatient and was treated with ceftazidime 1500mg intraperitoneal, presents with cloudy PD fluid , fatigue and abdominal pain.  Nephrology following for ESRD mangement and peritoneal infection.    1. Peritonitis with polymicrobial infection  Cultures have now been positive for pseudomonas, ecoli, strep, candida and Bacteroides implying intrabd source although abd CT and surgery assessments have been negative.   Grossly cloudy PD fluid on admission cell counts >4000 ->decreased to 457->109->874->2527->268 although last sample may have been diluted. Antibiotic coverage now broadened to ceftazidine 1.5 g ip qd, vanco ip to maintain trough >20 (received dose 1/12), gentamicin started 1/12 level 5.2 yesterday, and now metronidazole 500 mg po qid and micafungin 100 mg q24h.   Based on the data he has failed treatment (twice) and it is time to remove PD catheter and transition to hemodialysis for a few weeks.  PD catheter will be removed today, tunnel catheter will be placed for initiation of hemodialysis.  Will order HD tomorrow a.m. we will obtain consent for.   Continue ceftaz, vanco, gent, metronidazole and micafungin - redose vanc and gent based on levels, we need to consult pharmacy to reduce all the antibiotic according to hemodialysis.       2. ESRD on PD since 06/2017  [facility] under Dr Mcpherson's care  PD prescription: 8hrs , 4 cycles , FV 2000, 2hr dwell , dextrose variable  UF on average 300-900  EDW 86Kg (190lbs)  Kt/V 1.5 PTA  Net UF about 700 ml  PD catheter will be removed today, tunnel catheter would be placed tomorrow, we will initiate hemodialysis tomorrow.      Electrolytes  Hypokalemia  Repleted with potassium  40meq on 1/10.  Resolved now.      MBD  Phos 3.3, (10/17)  Calcium 8.5, albumin 1.8  On phoslo and Vit D      Anemia  Hb 9.6  On EPO 80962 units Qmonthly  Transfuse if <7  Iron replete per PTA labs       3. Influenza with pneumonia - bilateral infiltrates on chest CT    Recommendations were communicated to primary team in person.      Patient seen and discussed  with Dr. Tena.  Bouchra Saunders  Nephrology Fellow  Pager: [phone]      Interval History :   Feels fine denies any complaint other than edema and leg swelling, no chest pain or shortness breath no fevers or chills no nausea or vomiting no abdominal pain, he scheduled to for PT catheter removal today    Review of Systems:   I reviewed the following systems:  GI: Good appetite. - large BM Friday(was constipated)  Neuro:  -confusion  Constitutional:  -fever -chills  CV: -dyspnea +edema.  - chest pain.    Physical Exam:   I/O last 3 completed shifts:  In: 1418 [P.O.:1060; I.V.:100; Other:258]  Out: 375 [Urine:375]   /69 (BP Location: Right arm)  Pulse 92  Temp 96.5  F (35.8  C) (Oral)  Resp 18  Wt 91.9 kg (202 lb 11.2 oz)  SpO2 100%  BMI 29.93 kg/m2     GENERAL APPEARANCE: NAD  EYES:  No scleral icterus, pupils equal  HENT: mouth without ulcers or lesions  PULM: good ae bilat - bilateral crackles bases  CV: regular rhythm, KELLY present ? gallop     -JVD -  Not seen      -edema bilateral 2+  GI: soft, mildly tender, slightly distended, bowel sounds are present  INTEGUMENT: no cyanosis, no rash  NEURO:  no asterixis      Labs:   All labs reviewed by me  Electrolytes/Renal -   Recent Labs   Lab Test  01/14/18   0749  01/13/18   0956  01/12/18   0742   11/21/17   1239  11/15/17   0758  11/14/17   0645   NA  135  137  137   < >  142  144  141   POTASSIUM  3.5  3.9  3.5   < >  4.6  3.2*  3.7   CHLORIDE  96  97  98   < >  102  103  103   CO2  31  32  32   < >  29  30  27   BUN  39*  39*  38*   < >  66*  69*  76*   CR  8.18*  8.52*  8.27*   < >   7.65*  6.98*  6.85*   GLC  260*  211*  250*   < >  189*  294*  312*   TRINI  8.5  8.6  8.4*   < >  8.9  8.9  8.9   MAG   --   1.8  1.7   --   1.9  1.9  1.8   PHOS   --    --   3.3   --    --   5.0*  4.3    < > = values in this interval not displayed.       CBC -   Recent Labs   Lab Test  01/14/18   0749  01/13/18   0956  01/12/18   0742   WBC  5.6  5.4  4.1   HGB  9.6*  9.9*  9.8*   PLT  337  344  323       LFTs -   Recent Labs   Lab Test  01/07/18   1655  11/13/17   1709  08/02/17   1311  07/05/17   1204   ALKPHOS  76  104   --   98   BILITOTAL  0.4  0.8   --   0.5   ALT  24  26   --   15   AST  32  22   --   13   PROTTOTAL  6.0*  6.0*   --   6.2*   ALBUMIN  1.8*  2.3*  3.2*  2.7*       Iron Panel -   Recent Labs   Lab Test  07/19/17   1306  07/05/17   1204  06/21/17   1058   IRON  46  26*  69   IRONSAT  18  12*  29   ALBERTINA  369  542*  557*       Current Medications:    cefTAZidime  1 g Intravenous Q24H     micafungin  100 mg Intravenous Q24H     metroNIDAZOLE  500 mg Oral Q6H JEAN     gentamicin (GARAMYCIN) place bui - receiving intermittent dosing  1 each Does not apply See Admin Instructions     insulin aspart  1-10 Units Subcutaneous TID AC     insulin aspart  1-7 Units Subcutaneous At Bedtime     allopurinol  400 mg Oral Daily     hydrALAZINE  25 mg Oral TID     torsemide  100 mg Oral BID     predniSONE  5 mg Oral Daily     senna-docusate  1 tablet Oral BID     spironolactone  50 mg Oral Daily     amLODIPine  5 mg Oral Daily     aspirin  81 mg Oral Daily     atorvastatin  40 mg Oral Daily     fluticasone  1-2 spray Both Nostrils Daily     calcitRIOL  0.25 mcg Oral Daily     calcium acetate  667 mg Oral TID w/meals     omeprazole  20 mg Oral Daily     losartan  100 mg Oral Daily     metoprolol succinate  100 mg Oral Daily        Bouchra Saunders MD

## 2018-01-15 NOTE — PHARMACY-VANCOMYCIN DOSING SERVICE
Pharmacy Vancomycin Note  Date of Service January 15, 2018  Patient's  1955   63 year old, male    Indication: peritonitis in patient with PD catheter for peritoneal dialysis  Goal Trough Level: 15-20 mg/L  Day of Therapy: 9  Current Vancomycin regimen dosed intermittently based on levels: last dose of vancomycin 2000mg IV given  @ 21:23    Creatinine for last 3 days  2018:  9:56 AM Creatinine 8.52 mg/dL  2018:  7:49 AM Creatinine 8.18 mg/dL --> peritoneal dialysis    Recent Vancomycin Levels (past 3 days)  2018:  2:02 PM Vancomycin Level 37.3 mg/L  2018:  2:40 PM Vancomycin Level 28.8 mg/L  1/15/2018:  8:15 AM Vancomycin Level 22.5 mg/L    Vancomycin IV Administrations (past 72 hours)      No vancomycin orders with administrations in past 72 hours.              Nephrotoxins and other renal medications (Future)    Start     Dose/Rate Route Frequency Ordered Stop    18  gentamicin (GARAMYCIN) place bui - receiving intermittent dosing      1 each Does not apply SEE ADMIN INSTRUCTIONS 01/13/18 2015      01/10/18 1600  torsemide (DEMADEX) tablet 100 mg      100 mg Oral 2 TIMES DAILY. 01/10/18 1157               Contrast Orders - past 72 hours     None        Interpretation of levels and current regimen:  Random vancomycin level drawn today continues to be supratherapeutic. PK based on vancomycin levels from  and 1/15 is Ke = 0.0059 hr-1 (half life of ~117 hours). Estimated vancomycin trough in 48 hours is 17 mg/L.    Has serum creatinine changed > 50% in last 72 hours: No - chronic peritoneal dialysis patient    Urine output:  Makes some urine, but on chronic PD    Renal Function: ESRD on Dialysis    Plan:  1.  Pharmacy will continue dosing vancomycin levels PRN based on levels. Pharmacy to f/u with primary team plan for hemodialysis. Will need to adjust dosing if patient initiates hemodialysis.   2.  Pharmacy will check trough levels as appropriate in 48 hours..    3.  Serum creatinine levels will be ordered daily for the first week of therapy and at least twice weekly for subsequent weeks - chronic PD.      Breann Cruz, Pharm.D., [facility]  Pager [phone]

## 2018-01-15 NOTE — PLAN OF CARE
VSS. Up ad edith. Pain managed with prn tylenol/tramadol. Voiding small amounts. Had a BM. Tolerating diet. BG checks 187 and 209. PIV saline locked. Using albuterol inhaler PRN. Peritoneal dialysis started around 2130. Continue with POC.

## 2018-01-15 NOTE — PLAN OF CARE
Problem: Patient Care Overview  Goal: Plan of Care/Patient Progress Review  Patient was seen by the medicine team, urology team and other teams on his plan regarding putting in a dialysis access for hemodialysis, NPO now for the procedure.

## 2018-01-15 NOTE — OP NOTE
Transplant Surgery  Operative Note    Preop Dx:  End Stage renal failure.  Postop Dx: Same  Procedure: Peritoneal dialysis catheter removal  Surgeon: Esteban Arvizu M.D., M.S.  Fellow: Ayan Laguna fellow,  Alexsander Moreno resident.    Anesthesia: General.  EBL: 3 ml  Fluids: 300 cc crystalloid  UO: 0 ml  Drains: None.  Specimen: Peritoneal dialysis catheter.  Complications: None.  Findings: Peritoneal catheter draining turbid peritoneal fluid.   Indication: The patient has End Stage kidney failure and has an infected peritoneal dialysis catheter with positive peritoneal cultures growing candida albicans, pseudomonas and E coli, not responsive which has relapsed after antibiotic therapy.  After discussing the risks and benefits of proceeding, the patient agreed to proceed with surgery and provided informed consent.     Procedure: The patient was brought to the operating room and placed supine on the operating table.  We used the stencil from the kit to alberto the catheter course and exit site.  After induction of general anesthesia, the abdomen was prepped and draped in the usual fashion.  A time-out was performed to ensure the correct patient and procedure.  Perioperative antibiotics were given.     A small incision has been made at the presumed PD catheter proximal cuff and using electric scalpel and blind dissection the proximal cuff has been dissected. Next we made a small skin incision at the skin exit of the catheter and the distal cuff was dissected from the surrounding tissue using the electric scalpel; the catheter was freed from surrounding tissue and removed. The peritoneal fluid has been send for cultures.     The skin incisions were closed with Monocryl and Dermabond. All needle and sponge counts were correct x 2.  The patient was awakened from anesthesia and transported to the recovery room, having tolerated the procedure well.  There were no apparent complications.  Faculty was present for the critical  portions of the procedure.     Physician Attestation   I was present for the key portions of the procedure and I was immediately available for the entire procedure between opening and closing.    Ty Jazzmine Arvizu  Date of Service (when I saw the patient): 1/15/18

## 2018-01-15 NOTE — ANESTHESIA CARE TRANSFER NOTE
Patient: Murray Nicholson    Procedure(s):  Open Removal of Peritoneal Dialysis Catheter  - Wound Class: II-Clean Contaminated    Diagnosis: Right Infected Peritoneal Dialysis Catheter  Diagnosis Additional Information: No value filed.    Anesthesia Type:   No value filed.     Note:  Airway :Face Mask  Patient transferred to:PACU  Handoff Report: Identifed the Patient, Identified the Reponsible Provider, Reviewed the pertinent medical history, Discussed the surgical course, Reviewed Intra-OP anesthesia mangement and issues during anesthesia, Set expectations for post-procedure period and Allowed opportunity for questions and acknowledgement of understanding      Vitals: (Last set prior to Anesthesia Care Transfer)    CRNA VITALS  1/15/2018 1707 - 1/15/2018 1748      1/15/2018             Pulse: 100    ART BP: 137/48    ART Mean: 85    SpO2: 100 %    Resp Rate (observed): 8    Resp Rate (set): 10                Electronically Signed By: VERONICA Alexander CRNA  January 15, 2018  5:48 PM

## 2018-01-15 NOTE — ANESTHESIA PREPROCEDURE EVALUATION
Anesthesia Evaluation     .             ROS/MED HX    ENT/Pulmonary:     (+)sleep apnea, , . .    Neurologic:       Cardiovascular:     (+) hypertension--CAD, --. : . CHF . . :. .       METS/Exercise Tolerance:     Hematologic:         Musculoskeletal:         GI/Hepatic:     (+) GERD       Renal/Genitourinary:     (+) chronic renal disease,       Endo:     (+) type I DM, .      Psychiatric:         Infectious Disease:         Malignancy:         Other:                                    Anesthesia Plan      History & Physical Review  History and physical reviewed and following examination; no interval change.    ASA Status:  3 .    NPO Status:  > 8 hours    Plan for General and ETT with Intravenous induction. Maintenance will be Balanced.    PONV prophylaxis:  Ondansetron (or other 5HT-3)  Additional equipment: 2nd IV and Arterial Line      Postoperative Care  Postoperative pain management:  IV analgesics.      Consents  Anesthetic plan, risks, benefits and alternatives discussed with:  Patient.  Use of blood products discussed: No .   .            Anesthesiology Attending Note    HPI: Murray Nicholson is a 63 year old male who  has a past medical history of (HFpEF) heart failure with preserved ejection fraction (H); Allergic rhinitis, cause unspecified; Anemia of chronic kidney failure; Ascending aortic aneurysm (H); Bicuspid aortic valve; CAD (coronary artery disease); Chronic kidney disease, stage 5 (H); Congestive heart failure, unspecified; Dyslipidemia; Esophageal reflux; Hypersomnia with sleep apnea, unspecified; Hypertension; MGUS (monoclonal gammopathy of unknown significance); SHEELA (obstructive sleep apnea); Systolic heart failure (H); and Type 2 diabetes mellitus (H).  has a past surgical history that includes Laparoscopic herniorrhaphy inguinal bilateral (Bilateral, 7/24/2015) and Laparoscopic insertion catheter peritoneal dialysis (N/A, 6/22/2017). with a SBP (spontaneous bacterial peritonitis) (H)  [K65.2] scheduled for Procedure(s):  Open Removal of Peritoneal Dialysis Catheter  - Wound Class: I-Clean.      PMHx/PSHx/ROS:  Past Medical History:   Diagnosis Date     (HFpEF) heart failure with preserved ejection fraction (H)      Allergic rhinitis, cause unspecified      Anemia of chronic kidney failure      Ascending aortic aneurysm (H)      Bicuspid aortic valve      CAD (coronary artery disease)      Chronic kidney disease, stage 5 (H)      Congestive heart failure, unspecified      Dyslipidemia      Esophageal reflux      Hypersomnia with sleep apnea, unspecified      Hypertension      MGUS (monoclonal gammopathy of unknown significance)      SHEELA (obstructive sleep apnea)      Systolic heart failure (H)      Type 2 diabetes mellitus (H)        Past Surgical History:   Procedure Laterality Date     LAPAROSCOPIC HERNIORRHAPHY INGUINAL BILATERAL Bilateral 7/24/2015    Procedure: LAPAROSCOPIC HERNIORRHAPHY INGUINAL BILATERAL;  Surgeon: Bobby Mcconnell MD;  Location: [facility] OR     LAPAROSCOPIC INSERTION CATHETER PERITONEAL DIALYSIS N/A 6/22/2017    Procedure: LAPAROSCOPIC INSERTION CATHETER PERITONEAL DIALYSIS;  Laparoscopic Peritoneal Dialysis Catheter Placement - Anesthesia with block;  Surgeon: Estbean Arivzu MD;  Location: [facility] OR       Soc Hx:   Social History   Substance Use Topics     Smoking status: Former Smoker     Packs/day: 1.00     Years: 19.00     Types: Cigarettes     Quit date: 8/18/1994     Smokeless tobacco: Never Used     Alcohol use No         Allergies:   Allergies   Allergen Reactions     Norco [Hydrocodone-Acetaminophen] Nausea and Vomiting     Cats      Throat tightness     Penicillins Hives     Seasonal Allergies      rhinitis     Shrimp      Throat closes        Meds:   Prescriptions Prior to Admission   Medication Sig Dispense Refill Last Dose     gentamicin (GARAMYCIN) 0.1 % cream Apply topically daily as needed   1/9/2018 at AM     B Complex-Biotin-FA (B-COMPLEX PO) Take 1 tablet by  mouth daily   1/9/2018 at AM     ACETAMINOPHEN PO Take 500-1,000 mg by mouth nightly as needed for pain   PRN     bismuth subsalicylate (PEPTO BISMOL) 262 MG/15ML suspension Take 15 mLs by mouth every 6 hours as needed for indigestion   12/26/2017     torsemide (DEMADEX) 100 MG tablet Take 1 tablet (100 mg) by mouth 2 times daily 60 tablet 11      calcium acetate, Phos Binder, 667 MG TABS Take 1 tablet by mouth 3 times daily (with meals) 180 tablet 3 1/9/2018 at AM     predniSONE (DELTASONE) 5 MG tablet Take 1 tablet (5 mg) by mouth daily 90 tablet 1 1/9/2018 at AM     predniSONE (DELTASONE) 20 MG tablet Take 2 tablets (40 mg) by mouth daily for 3 days for gout flares 30 tablet 2 12/26/2017     amLODIPine (NORVASC) 5 MG tablet Take 1 tablet (5 mg) by mouth daily 90 tablet 0 1/9/2018 at AM     metoprolol (TOPROL XL) 100 MG 24 hr tablet Take 1 tablet (100 mg) by mouth daily 90 tablet 3 1/9/2018 at AM     allopurinol (ZYLOPRIM) 100 MG tablet Take 1 tablet (100 mg) by mouth daily Take with 300 mg tabs for 400 mg /day total. 30 tablet 2 1/9/2018 at AM     allopurinol (ZYLOPRIM) 300 MG tablet Take 1 tablet (300 mg) by mouth daily Take with 100 mg tabs for 400 mg /day total. 30 tablet 2 1/9/2018 at AM     losartan (COZAAR) 100 MG tablet TAKE 1 TABLET BY MOUTH DAILY 90 tablet 0 1/9/2018 at AM     albuterol (PROAIR HFA/PROVENTIL HFA/VENTOLIN HFA) 108 (90 BASE) MCG/ACT Inhaler Inhale 2 puffs into the lungs every 6 hours as needed for shortness of breath / dyspnea or wheezing 1 Inhaler 0 PRN     fluticasone (FLONASE) 50 MCG/ACT spray Spray 1-2 sprays into both nostrils daily (Patient taking differently: Spray 2 sprays into both nostrils daily ) 16 g 11 1/9/2018 at AM     spironolactone (ALDACTONE) 100 MG tablet Take 0.5 tablets (50 mg) by mouth daily (Patient taking differently: Take 100 mg by mouth daily ) 30 tablet 11 1/9/2018 at AM     calcitRIOL (ROCALTROL) 0.25 MCG capsule Take 1 capsule (0.25 mcg) by mouth daily 90  capsule 0 1/9/2018 at AM     atorvastatin (LIPITOR) 40 MG tablet Take 1 tablet (40 mg) by mouth daily 90 tablet 3 1/9/2018 at AM     isosorbide mononitrate (IMDUR) 60 MG 24 hr tablet Take 1 tablet (60 mg) by mouth daily 90 tablet 3 1/9/2018 at AM     hydrALAZINE (APRESOLINE) 50 MG tablet Take 1 tablet (50 mg) by mouth 3 times daily 270 tablet 3 1/9/2018 at AM     omeprazole (PRILOSEC) 20 MG CR capsule Take 20 mg by mouth daily At night   1/9/2018 at AM     loratadine (CLARITIN) 10 MG tablet Take 10 mg by mouth daily as needed Reported on 5/3/2017   1/6/2018     ASPIRIN 81 MG OR TABS Take 1 tablet (81 mg) by mouth at bedtime   1/9/2018 at AM       Current Outpatient Prescriptions   Medication Sig Dispense Refill     order for DME Equipment being ordered: Com[employer] ()  Treatment Diagnosis: ESRD on PD  Pt has to be connected to PD all night and can not be disconnected, hence impending his mobility to go to the bathroom. At risk for infection if he does not have this equipment. 1 Units 0         Labs:  BMP:  Recent Labs   Lab Test  01/14/18   0749   NA  135   POTASSIUM  3.5   CHLORIDE  96   CO2  31   BUN  39*   CR  8.18*   GLC  260*   TRINI  8.5     CBC:   Recent Labs   Lab Test  01/14/18   0749   WBC  5.6   RBC  3.12*   HGB  9.6*   HCT  31.0*   MCV  99   MCH  30.8   MCHC  31.0*   RDW  17.0*   PLT  337     Coags:  Recent Labs   Lab Test  11/29/16   1125  08/10/15   0729   INR  1.6*  1.02   PTT   --   29     Vital Signs:  T 96.5  P 92  /69  R 18  SpO2 100%    Anesthetic Assessment and Plan:    COPIED FROM NEPHROLOGY NOTE 1/7/2018   Murray Nicholson is a 63 year old male with a PMH of ESRD sec to likely DMII and HTN , CAD , HFrEF 20-25%, HLD, MGUS, SHEELA , Ascending AA (4.6 cm 4/2017), Gout on prednisone and allopurinol, GERD, admitted with peritonitis diagnosed 12/29 outpatient and was treated with ceftazidime 1500mg intraperitoneal, presents with cloudy PD fluid , fatigue and abdominal pain.  To OR for open PD catheter  removal.   Currently mild hypervolemia with mild edema (positive fluid balance in the past week)  Hypotensive on admission, but BP stable now  Pneumonia (RL infiltrates) with cough, getting better. SpO2 99% on room air.  Diagnosed with FLU, currently on Tamiflu.  Last ECHO from 4/2017; patient states that his cardiac symptoms did not worsen since.  PD catheter is working, but it is now infected.  Last HD was overnight last night. HD catheter placement in IR is planned for tomorrow.    63 year old male who  has a past medical history of (HFpEF) heart failure with preserved ejection fraction (H); Allergic rhinitis, cause unspecified; Anemia of chronic kidney failure; Ascending aortic aneurysm (H); Bicuspid aortic valve; CAD (coronary artery disease); Chronic kidney disease, stage 5 (H); Congestive heart failure, unspecified; Dyslipidemia; Esophageal reflux; Hypersomnia with sleep apnea, unspecified; Hypertension; MGUS (monoclonal gammopathy of unknown significance); SHEELA (obstructive sleep apnea); Systolic heart failure (H); and Type 2 diabetes mellitus (H). to OR for Procedure(s):  Open Removal of Peritoneal Dialysis Catheter  - Wound Class: I-Clean    NPO status adequate.  ASA 3  Patient is in isolation for his FLU symptoms.  Plan for GA, pre-induction art line, standard monitors, large bore IVs, PONV prophylaxis, steroid stress dose. Antibiotics and anti-rejection medications per primary service.    Prior to anesthetic I have examined the patient, reviewed the medical history and pertinent results, and discussed the ssessment and plan with the anesthesia and surgery teams.  Anesthetic risks discussed with the patient, consent in chart.      Christine Decker MD  Anesthesiology Attending  01/15/18                 .

## 2018-01-15 NOTE — PROGRESS NOTES
[facility], [place]    Internal Medicine Progress Note    Date of Service (when Attending saw the patient): 01/15/2018    Interval History   No acute events over night per nursing. Patient does not endorse any abdominal pain, fevers, or chills. Is still having some coughing, he is flu positive. One of patients main concerns is his dietary restrictions.    Assessment & Plan   Medical Student Note Resident Note   Assessment and Plan (Student)    Assessment:    Murray sánchez is a 63 year old male with complex medical history of DM II, MGUS, HLD, HTN, ESRD (likely 2/2 DM, HTN) on PD, HFrEF (EF 20-25% with severe global hypokinesis), anemia of chronic disease, and gout on allopurinol and prednisone who was admitted for treatment of bacterial peritonitis that failed outpatient treatment.    Plan:    # Multi-organism bacterial peritonitis  # (?) Candidal peritonitis  # Kidney failure on Peritoneal Dialysis  Initial cultures at [facility] showed E. Coli, pseudomonas, and strep. After failing outpatient Tx with intraperitoneal ceftazidine, patient was started on IV ceftazidine and vancomycin. Peritoneal analysis initially showed improving neutrophil count, but repeat peritoneal analysis showed increased neutrophil counts as well as candida and bacteroides caccae on hospital day 4 -> patient was added on intraperitoneal gentamycin as well as oral micafungin and metronidazole. One peritoneal analysis on 1/13/18 showed decreased neutrophil count in the 400s, this is thought to be a lab/collection error due to dilution. Most recent peritoneal fluid analysis showed increasing neutrophil count in the 4000's.  - ID and nephrology on board, appreciate recs  - Aerobic and yeast cultures pending on most recent peritoneal fluid analysis  - Surgery to remove PD catheter on 1/16/18  - pharmacy to dose IV vancomycin  - Pharmacy to dose IV gentamycin  - IV ceftaz 1 g per day  - oral micafungin  - oral  metronidazole  - Vitals Q8  - continue peritoneal calcitriol and phoslo until PD removed.    # Influenza  Patient tested flu positive, CXR on 1/11 showed a RLL opacity  - Flonase  - Tessalon pearls PRM  - Patient received 6 doses of tamiflu 75mg (now D/C)    # Hypokalemia  - replace PRN    # DM II  A1C on admission was 9.1. On glipizide at home (no insulin)  - glucose checks 30 min prior to meals & bedtime  - SSI  - Holding glipizide (risk of hypoglycemia)    #HFrEF (EF 20-25% on 4/2017 TTE) - Stage C, NYHA class III  #CAD  #HTN  #HLD  #Ascending aortic aneurysm  No cardiorespiratory symptoms on presentation. On Losartan, Toprol and ASA. Last Echo 4/2017 with  coronary angiogram yet to be done, although ordered. Ascending aortic aneurysm 4.8cm as at last check 8/2/2017.   - Continue PTA ASA 81mg daily  - Continue PTA Losartan 100mg daily  - Continue  PTA Toprol XL 100mg daily  - Continue PTA Torsemide 100mg BID (was on bumex but currently on torsemide which is a recent change)  - Continue PTA Atorvastatin 40mg daily  - Continue PTA Amlodipine 5 mg daily  - CORE clinic referral on d/c  - Continue  PTA spironolactone 50mg daily   - Restart PTA Hydralazine at a lower dose 25mg TID (50mg TID at home)  - Hold Imdur        CHRONIC PROBLEMS  # Gout. Stable. Continue Allopurinol, prednisone (patient reports he gets frequent flares)  # MGUS. Stable.  # Anemia of chronic disease: Stable    # GERD. Stable. On Omeprazole     Diet: normal diet  Fluids: none  DTV prophylazis: pneumatic compression device, ambulatory    FULL CODE   Assessment and Plan (Resident)    Murray Nicholson is a 63 year old male with history of T2DM, MGUS, HLD, HTN, ESRD (likely 2/2 DM,HTN) on PD, HFrEF (EF - 20-25% with severe global hypokinesis), anemia of chronic disease and gout on allopurinol and prednisone who is admitted for further treatment of bacterial peritonitis that has failed outpatient treatment. Patient hemodynamically stable, plan for PD  removal.       # Bacterial peritonitis s/p treatment failure (9 day intraperitoneal course of Ceftazidime).  # ESRD 2/2 T2Dm, HTN - on PD  Patient with abdominal pain, cloudy dialysate and s/p 9 day course of intraperitoneal ceftazidime for suspected peritonitis. Cultures were done at [facility]. Per Nephrology note, peritoneal fluid notable for E.Coli,  Pseudomonas and Streptococcus. Now growing candida glabrata and bacteroides caccae on day 4. WBC in peritoneal fluid originally improving, but has now been increasing, concern for biofilm on PD catheter.  - PD catheter removal 1/15  - ID consulted, appreciate recs  - Antimicrobials:    -ceftazidime     -metronidazole    -vancomycin    -micafungin    -gentamycin  - Tunneled catheter via IR 1/16  - Monitor vital signs closely  - Nephrology following, appreciate recs  - Continue PTA calcitriol, and phoslo     # Influenza  CXR on 1/11 showed right LL opacity.  - Continue flonase  - Tessalon perles  - Tamiflu completed     # Hypokalemia  - Replace PRN      # T2DM  Last A1C - 6.4 in 6/2017, but 9.1 on admission. On Glipizide PTA and no insulin. BG has been elevated this admission likely due to acute infection, but based on his A1c his BG hasn't been well controlled in recent months.  - Hold Glipizide due to hypoglycemia risk. Will resume at discharge  - High dose SSI  - Glucose checks  - Hypoglycemia protocol      #HFrEF (EF 20-25% on 4/2017 TTE) - Stage C, NYHA class III  #CAD  #HTN  #HLD  #Ascending aortic aneurysm  No cardiorespiratory symptoms on presentation. On Losartan, Toprol and ASA. Last Echo 4/2017 with  coronary angiogram yet to be done, although ordered. Ascending aortic aneurysm 4.8cm as at last check 8/2/2017.   - Continue PTA ASA 81mg daily  - Continue PTA Losartan 100mg daily  - Continue  PTA Toprol XL 100mg daily  - Continue PTA Torsemide 100mg BID (was on bumex but currently on torsemide which is a recent change)  - Continue PTA Atorvastatin 40mg daily  -  Continue PTA Amlodipine 5 mg daily  - CORE clinic referral on d/c  - Continue  PTA spironolactone 50mg daily   - Restart PTA Hydralazine at a lower dose 25mg TID (50mg TID at home)  - Hold Imdur      CHRONIC PROBLEMS  # Gout. Stable. Continue Allopurinol, prednisone (patient reports he gets frequent flares)  # MGUS. Stable.  # Anemia of chronic disease: Stable    # GERD. Stable. On Omeprazole      Diet: normal  Fluids: none  DVT Prophylaxis: Pneumatic Compression Devices, ambulate   Code Status: Full Code    Disposition Plan: Expected discharge in 2-3 days, once improvement in infection and antibiotic plan finalized.    Patient seen and discussed with attending provider Dr. Breanne JONES.    Markell Gonzalez  Internal Medicine PGY2      Physical Exam (Student)  Constitutional: Patient alert and oriented, pleasant and conversational  HEENT: EOM intact, non-icteric  Skin/Integumen: Non-jaundiced. Well perfused      Data Interpretation  I have reviewed today's vital signs, medications, labs and imaging which are significant for potassium of 3.5.     Physical Exam (Resident)  Constitutional: pleasant male, comfortable in chair in NAD  HEENT: sclera anicteric, mucus membranes moist  Respiratory: clear bilaterally  Cardiovascular: normal rate, regular rhythm, no m/r/g  GI: soft, non distended, non tender  Skin: no rashes or bruising  Ext: no LE edema  Neurologic: alert and oriented, fluent speech, no apparent focal deficits  Neuropsychiatric: normal mood/affect      Data Interpretation  I have reviewed today's vital signs, medications, labs and imaging which are significant for:  WBC on paracentesis fluid 4256, 91% PMN     Binta Mendoza, MS4 Markell Burch 1/15/2018 2:52 PM     Medications        cefTAZidime  1 g Intravenous Q24H     micafungin  100 mg Intravenous Q24H     metroNIDAZOLE  500 mg Oral Q6H JEAN     gentamicin (GARAMYCIN) place bui - receiving intermittent dosing  1 each Does not apply See Admin  Instructions     insulin aspart  1-10 Units Subcutaneous TID AC     insulin aspart  1-7 Units Subcutaneous At Bedtime     allopurinol  400 mg Oral Daily     hydrALAZINE  25 mg Oral TID     torsemide  100 mg Oral BID     predniSONE  5 mg Oral Daily     senna-docusate  1 tablet Oral BID     spironolactone  50 mg Oral Daily     amLODIPine  5 mg Oral Daily     aspirin  81 mg Oral Daily     atorvastatin  40 mg Oral Daily     fluticasone  1-2 spray Both Nostrils Daily     calcitRIOL  0.25 mcg Oral Daily     calcium acetate  667 mg Oral TID w/meals     omeprazole  20 mg Oral Daily     losartan  100 mg Oral Daily     metoprolol succinate  100 mg Oral Daily       Data     Recent Labs  Lab 01/14/18  0749 01/13/18  0956 01/12/18  0742   WBC 5.6 5.4 4.1   HGB 9.6* 9.9* 9.8*   MCV 99 99 99    344 323    137 137   POTASSIUM 3.5 3.9 3.5   CHLORIDE 96 97 98   CO2 31 32 32   BUN 39* 39* 38*   CR 8.18* 8.52* 8.27*   ANIONGAP 8 8 7   TRINI 8.5 8.6 8.4*   * 211* 250*       No results found for this or any previous visit (from the past 24 hour(s)).

## 2018-01-15 NOTE — CONSULTS
Patient is on IR schedule 01/16/2018  for a Double lumen large bore tunneled central line placement for dialysis.   Labs WNL for procedure.    Orders for NPO, scrubs and antibiotics have been entered.  Consent will be done prior to procedure.    Please contact the IR charge RN at [phone] for estimated time of procedure.     Lesa Betancourt IR RPA  [phone] Call pager  [phone] pager

## 2018-01-15 NOTE — PLAN OF CARE
Problem: Patient Care Overview  Goal: Plan of Care/Patient Progress Review  Outcome: Improving  Contact and Droplet isolation. AVSS on RA. A+OX4. Pain controlled with prn tylenol and tramadol. Renal and moderate CHO diet. Peritoneal Dialysis running. Peritoneal fluid sample collected. No void or BM overnight. UAL, steady on feet.   Continue POC. Patient OK with bedside report but does not want to be woken for it.

## 2018-01-15 NOTE — PROGRESS NOTES
Clinical Nutrition Services- Brief Note    Reviewed nutrition risk factors due to LOS. Pt tolerating renal/mod-cho diet per chart review, with flowsheets noting good intake. No nutrition issues identified at this time. RD will continue to follow per nutrition protocol.  Vidya Hendrickson RD, LD  Unit pager: [phone]

## 2018-01-15 NOTE — PHARMACY-AMINOGLYCOSIDE DOSING SERVICE
Pharmacy Aminoglycoside Follow-Up Note  Date of Service January 15, 2018  Patient's  1955   63 year old, male    Indication: Peritonitis in patient with PD cather for peritoneal dialysis  Current Gentamicin regimen:  Dosed intermittently pending levels (Gentamicin 180mg IV given  @ 1919)  Day of therapy: 4    Target goals based on conventional dosing  Goal Peak level: 6-8 mg/L  Goal Trough level: </= 1 mg/L    Current estimated CrCl: CrCl cannot be calculated (Unknown ideal weight.).    Creatinine for last 3 days  2018:  9:56 AM Creatinine 8.52 mg/dL  2018:  7:49 AM Creatinine 8.18 mg/dL    Nephrotoxins and other renal medications (Future)    Start     Dose/Rate Route Frequency Ordered Stop    18  gentamicin (GARAMYCIN) place bui - receiving intermittent dosing      1 each Does not apply SEE ADMIN INSTRUCTIONS 01/13/18 2015      01/10/18 1600  torsemide (DEMADEX) tablet 100 mg      100 mg Oral 2 TIMES DAILY. 01/10/18 1157            Contrast Orders - past 72 hours     None          Aminoglycoside Levels - past 2 days  2018:  7:59 PM Gentamicin Level 5.2 mg/L  2018:  7:49 AM Gentamicin Level 3.9 mg/L  1/15/2018:  8:15 AM Gentamicin Level 2.6 mg/L    Aminoglycosides IV Administrations (past 72 hours)      No aminoglycosides orders with administrations in past 72 hours.                Interpretation of levels and current regimen:  Random gentamicin level today continues to be therapeutic since patient is CKD stage V receiving peritoneal dialysis. Estimated Ke based on levels from  and 1/15 is 0.019 hr-1, which is equal to a half-life of 36.5 hours. Estimated trough of <1 mg/L is 18 @ 10:15.     Has serum creatinine changed greater than 50% in the last 72 hours: No - CKD stage V    Urine output:  Makes some urine, but on chronic PD    Renal function: ESRD on Dialysis    Plan  1. Will recheck a gentamicin level in AM on . Will follow up with team on  regarding  plan for hemodialysis since plan is to pull peritoneal dialysis catheter today.    2.  Method of evaluation: 2 post dose levels    3. Pharmacy will continue to follow and check levels  as appropriate in 1-3 Days    Breann Cruz, Pharm.D., [facility]  Pager [phone]

## 2018-01-15 NOTE — PLAN OF CARE
Problem: Patient Care Overview  Goal: Plan of Care/Patient Progress Review  Patient will be going down to OR for the dialysis access to be placed, has been NPO since morning and MD has explained to patient the procedure, showered by self, alert, denies pain, continues to cough intermittently.  Continue to monitor.

## 2018-01-15 NOTE — PLAN OF CARE
Problem: Patient Care Overview  Goal: Plan of Care/Patient Progress Review  Patient went to OR for placement of dialysis access.

## 2018-01-15 NOTE — PROGRESS NOTES
Care Coordinator Progress Note     Admission Date/Time:  1/7/2018  Attending MD:  Breanne Miller MD     Data  Chart reviewed, discussed with interdisciplinary team.   Patient was admitted for:    SBP (spontaneous bacterial peritonitis) (H)  CKD (chronic kidney disease) stage 5, GFR less than 15 ml/min (H).    Concerns with insurance coverage for discharge needs: None.  Current Living Situation: Patient lives with spouse.  Support System: Supportive and Involved  Services Involved: Dialysis Services  Transportation: Car  Barriers to Discharge: successful transition to HD while inpatient, IV ABX plan, confirmed [facility] chairtime for new HD    Coordination of Care and Referrals:   D: Chart reviewed and plan of care discussed with MD team in interdisciplinary rounds and patient at bedside. Patient admitted for bacterial peritonitis also found to be Influenza positive. Plan for patient to discharge when both In Center Hemo Dialysis has been arranged and confirmed and IV Antibiotics (whether by HD or by PICC) has been arranged and confirmed.     Central Dialysis Cath placed today; first HD to be tomorrow Tuesday. Pending clinical course RN CC best estimation for first outpatient HD at [facility] is Saturday 1/20 or Tuesday 1/23.    I/A: Provided patient with options for In-Center Hemo Dialysis Services; patient wishes to continue with [facility] (PD service was with [facility]). His preference is a facility closer to home (zip code [facility]). A Tuesday, Thursday, Saturday dialysis schedule would work best with his work schedule. Information gathered from Number 100 and 41 page fax sent to [facility] Admissions at 1340 today. Hep B panel ordered, needs to be faxed after resulted.     P: Care coordinator will remain available for discharge needs that may arise.      Plan  Anticipated Discharge Date:  TBD pending clinical course  Anticipated Discharge Plan:  Home    Carrie KHALILN RN PHN  Patient Care Management Coordinator  [address],  [place]  Phone: [phone] / Pager: [phone]]

## 2018-01-16 ENCOUNTER — APPOINTMENT (OUTPATIENT)
Dept: INTERVENTIONAL RADIOLOGY/VASCULAR | Facility: CLINIC | Age: 63
DRG: 981 | End: 2018-01-16
Payer: COMMERCIAL

## 2018-01-16 LAB
ANION GAP SERPL CALCULATED.3IONS-SCNC: 9 MMOL/L (ref 3–14)
BUN SERPL-MCNC: 44 MG/DL (ref 7–30)
CALCIUM SERPL-MCNC: 8.3 MG/DL (ref 8.5–10.1)
CHLORIDE SERPL-SCNC: 97 MMOL/L (ref 94–109)
CO2 SERPL-SCNC: 31 MMOL/L (ref 20–32)
CREAT SERPL-MCNC: 8.69 MG/DL (ref 0.66–1.25)
ERYTHROCYTE [DISTWIDTH] IN BLOOD BY AUTOMATED COUNT: 17.2 % (ref 10–15)
GFR SERPL CREATININE-BSD FRML MDRD: 6 ML/MIN/1.7M2
GLUCOSE BLDC GLUCOMTR-MCNC: 133 MG/DL (ref 70–99)
GLUCOSE BLDC GLUCOMTR-MCNC: 136 MG/DL (ref 70–99)
GLUCOSE BLDC GLUCOMTR-MCNC: 175 MG/DL (ref 70–99)
GLUCOSE BLDC GLUCOMTR-MCNC: 93 MG/DL (ref 70–99)
GLUCOSE BLDC GLUCOMTR-MCNC: 99 MG/DL (ref 70–99)
GLUCOSE SERPL-MCNC: 102 MG/DL (ref 70–99)
HCT VFR BLD AUTO: 29.6 % (ref 40–53)
HGB BLD-MCNC: 9.2 G/DL (ref 13.3–17.7)
INR PPP: 1.18 (ref 0.86–1.14)
MCH RBC QN AUTO: 30.8 PG (ref 26.5–33)
MCHC RBC AUTO-ENTMCNC: 31.1 G/DL (ref 31.5–36.5)
MCV RBC AUTO: 99 FL (ref 78–100)
PLATELET # BLD AUTO: 340 10E9/L (ref 150–450)
POTASSIUM SERPL-SCNC: 4.2 MMOL/L (ref 3.4–5.3)
RBC # BLD AUTO: 2.99 10E12/L (ref 4.4–5.9)
SODIUM SERPL-SCNC: 137 MMOL/L (ref 133–144)
WBC # BLD AUTO: 19.9 10E9/L (ref 4–11)

## 2018-01-16 PROCEDURE — 25000131 ZZH RX MED GY IP 250 OP 636 PS 637: Performed by: INTERNAL MEDICINE

## 2018-01-16 PROCEDURE — 25000128 H RX IP 250 OP 636: Performed by: INTERNAL MEDICINE

## 2018-01-16 PROCEDURE — C1750 CATH, HEMODIALYSIS,LONG-TERM: HCPCS

## 2018-01-16 PROCEDURE — 02H633Z INSERTION OF INFUSION DEVICE INTO RIGHT ATRIUM, PERCUTANEOUS APPROACH: ICD-10-PCS | Performed by: PHYSICIAN ASSISTANT

## 2018-01-16 PROCEDURE — 36558 INSERT TUNNELED CV CATH: CPT | Mod: LT

## 2018-01-16 PROCEDURE — 25000128 H RX IP 250 OP 636: Performed by: STUDENT IN AN ORGANIZED HEALTH CARE EDUCATION/TRAINING PROGRAM

## 2018-01-16 PROCEDURE — 27210738 ZZH ACCESSORY CR2

## 2018-01-16 PROCEDURE — 12000001 ZZH R&B MED SURG/OB UMMC

## 2018-01-16 PROCEDURE — 25000128 H RX IP 250 OP 636: Performed by: PEDIATRICS

## 2018-01-16 PROCEDURE — 85027 COMPLETE CBC AUTOMATED: CPT | Performed by: RADIOLOGY PRACTITIONER ASSISTANT

## 2018-01-16 PROCEDURE — 99152 MOD SED SAME PHYS/QHP 5/>YRS: CPT

## 2018-01-16 PROCEDURE — 36415 COLL VENOUS BLD VENIPUNCTURE: CPT | Performed by: RADIOLOGY PRACTITIONER ASSISTANT

## 2018-01-16 PROCEDURE — 85610 PROTHROMBIN TIME: CPT | Performed by: RADIOLOGY PRACTITIONER ASSISTANT

## 2018-01-16 PROCEDURE — 25000125 ZZHC RX 250: Performed by: PHYSICIAN ASSISTANT

## 2018-01-16 PROCEDURE — 25000128 H RX IP 250 OP 636: Performed by: RADIOLOGY

## 2018-01-16 PROCEDURE — 25000125 ZZHC RX 250: Performed by: RADIOLOGY

## 2018-01-16 PROCEDURE — C1769 GUIDE WIRE: HCPCS

## 2018-01-16 PROCEDURE — 25000132 ZZH RX MED GY IP 250 OP 250 PS 637: Performed by: INTERNAL MEDICINE

## 2018-01-16 PROCEDURE — 27210908 ZZH NEEDLE CR4

## 2018-01-16 PROCEDURE — 27210732 ZZH ACCESSORY CR1

## 2018-01-16 PROCEDURE — 76937 US GUIDE VASCULAR ACCESS: CPT

## 2018-01-16 PROCEDURE — 27210995 ZZH RX 272: Performed by: STUDENT IN AN ORGANIZED HEALTH CARE EDUCATION/TRAINING PROGRAM

## 2018-01-16 PROCEDURE — 25000128 H RX IP 250 OP 636: Performed by: PHYSICIAN ASSISTANT

## 2018-01-16 PROCEDURE — 99233 SBSQ HOSP IP/OBS HIGH 50: CPT | Mod: GC | Performed by: PEDIATRICS

## 2018-01-16 PROCEDURE — 25000132 ZZH RX MED GY IP 250 OP 250 PS 637: Performed by: STUDENT IN AN ORGANIZED HEALTH CARE EDUCATION/TRAINING PROGRAM

## 2018-01-16 PROCEDURE — 25000132 ZZH RX MED GY IP 250 OP 250 PS 637: Performed by: HOSPITALIST

## 2018-01-16 PROCEDURE — 99153 MOD SED SAME PHYS/QHP EA: CPT

## 2018-01-16 PROCEDURE — 80048 BASIC METABOLIC PNL TOTAL CA: CPT | Performed by: STUDENT IN AN ORGANIZED HEALTH CARE EDUCATION/TRAINING PROGRAM

## 2018-01-16 PROCEDURE — 00000146 ZZHCL STATISTIC GLUCOSE BY METER IP

## 2018-01-16 PROCEDURE — 90937 HEMODIALYSIS REPEATED EVAL: CPT

## 2018-01-16 PROCEDURE — 40000141 ZZH STATISTIC PERIPHERAL IV START W/O US GUIDANCE

## 2018-01-16 PROCEDURE — 27210904 ZZH KIT CR6

## 2018-01-16 RX ORDER — FENTANYL CITRATE 50 UG/ML
25-50 INJECTION, SOLUTION INTRAMUSCULAR; INTRAVENOUS EVERY 5 MIN PRN
Status: DISCONTINUED | OUTPATIENT
Start: 2018-01-16 | End: 2018-01-19

## 2018-01-16 RX ORDER — CLINDAMYCIN PHOSPHATE 900 MG/50ML
900 INJECTION, SOLUTION INTRAVENOUS ONCE
Status: COMPLETED | OUTPATIENT
Start: 2018-01-16 | End: 2018-01-16

## 2018-01-16 RX ORDER — HEPARIN SODIUM 1000 [USP'U]/ML
3 INJECTION, SOLUTION INTRAVENOUS; SUBCUTANEOUS ONCE
Status: DISCONTINUED | OUTPATIENT
Start: 2018-01-16 | End: 2018-01-16

## 2018-01-16 RX ORDER — FLUMAZENIL 0.1 MG/ML
0.2 INJECTION, SOLUTION INTRAVENOUS
Status: DISCONTINUED | OUTPATIENT
Start: 2018-01-16 | End: 2018-01-20 | Stop reason: HOSPADM

## 2018-01-16 RX ORDER — HEPARIN SODIUM 1000 [USP'U]/ML
3 INJECTION, SOLUTION INTRAVENOUS; SUBCUTANEOUS ONCE
Status: DISCONTINUED | OUTPATIENT
Start: 2018-01-16 | End: 2018-01-20 | Stop reason: HOSPADM

## 2018-01-16 RX ORDER — NALOXONE HYDROCHLORIDE 0.4 MG/ML
.1-.4 INJECTION, SOLUTION INTRAMUSCULAR; INTRAVENOUS; SUBCUTANEOUS
Status: DISCONTINUED | OUTPATIENT
Start: 2018-01-16 | End: 2018-01-16

## 2018-01-16 RX ADMIN — ACETAMINOPHEN 650 MG: 325 TABLET ORAL at 08:43

## 2018-01-16 RX ADMIN — CALCITRIOL 0.25 MCG: 0.25 CAPSULE, LIQUID FILLED ORAL at 07:56

## 2018-01-16 RX ADMIN — HYDRALAZINE HYDROCHLORIDE 25 MG: 25 TABLET ORAL at 14:03

## 2018-01-16 RX ADMIN — TRAMADOL HYDROCHLORIDE 50 MG: 50 TABLET, COATED ORAL at 20:39

## 2018-01-16 RX ADMIN — MICAFUNGIN SODIUM 100 MG: 10 INJECTION, POWDER, LYOPHILIZED, FOR SOLUTION INTRAVENOUS at 21:16

## 2018-01-16 RX ADMIN — MIDAZOLAM 2 MG: 1 INJECTION INTRAMUSCULAR; INTRAVENOUS at 10:45

## 2018-01-16 RX ADMIN — SODIUM CHLORIDE 1000 ML: 9 INJECTION, SOLUTION INTRAVENOUS at 18:42

## 2018-01-16 RX ADMIN — ATORVASTATIN CALCIUM 40 MG: 40 TABLET, FILM COATED ORAL at 20:39

## 2018-01-16 RX ADMIN — METRONIDAZOLE 500 MG: 500 TABLET ORAL at 20:14

## 2018-01-16 RX ADMIN — Medication: at 18:43

## 2018-01-16 RX ADMIN — OMEPRAZOLE 20 MG: 20 CAPSULE, DELAYED RELEASE ORAL at 07:56

## 2018-01-16 RX ADMIN — HYDRALAZINE HYDROCHLORIDE 25 MG: 25 TABLET ORAL at 07:56

## 2018-01-16 RX ADMIN — GENTAMICIN SULFATE 200 MG: 40 INJECTION, SOLUTION INTRAMUSCULAR; INTRAVENOUS at 19:52

## 2018-01-16 RX ADMIN — PREDNISONE 5 MG: 2.5 TABLET ORAL at 07:56

## 2018-01-16 RX ADMIN — ALLOPURINOL 400 MG: 300 TABLET ORAL at 07:58

## 2018-01-16 RX ADMIN — TORSEMIDE 100 MG: 100 TABLET ORAL at 19:02

## 2018-01-16 RX ADMIN — SENNOSIDES AND DOCUSATE SODIUM 1 TABLET: 8.6; 5 TABLET ORAL at 07:56

## 2018-01-16 RX ADMIN — FLUTICASONE PROPIONATE 2 SPRAY: 50 SPRAY, METERED NASAL at 07:53

## 2018-01-16 RX ADMIN — CALCIUM ACETATE 667 MG: 667 CAPSULE ORAL at 19:02

## 2018-01-16 RX ADMIN — HEPARIN SODIUM 2000 UNITS: 1000 INJECTION, SOLUTION INTRAVENOUS; SUBCUTANEOUS at 11:08

## 2018-01-16 RX ADMIN — CEFTAZIDIME 1 G: 2 INJECTION, POWDER, FOR SOLUTION INTRAVENOUS at 22:51

## 2018-01-16 RX ADMIN — SODIUM CHLORIDE 250 ML: 9 INJECTION, SOLUTION INTRAVENOUS at 18:44

## 2018-01-16 RX ADMIN — SPIRONOLACTONE 50 MG: 25 TABLET ORAL at 07:54

## 2018-01-16 RX ADMIN — METRONIDAZOLE 500 MG: 500 TABLET ORAL at 07:56

## 2018-01-16 RX ADMIN — METRONIDAZOLE 500 MG: 500 TABLET ORAL at 02:03

## 2018-01-16 RX ADMIN — ACETAMINOPHEN 650 MG: 325 TABLET ORAL at 23:42

## 2018-01-16 RX ADMIN — CLINDAMYCIN PHOSPHATE 900 MG: 18 INJECTION, SOLUTION INTRAVENOUS at 10:45

## 2018-01-16 RX ADMIN — TORSEMIDE 100 MG: 100 TABLET ORAL at 07:54

## 2018-01-16 RX ADMIN — LIDOCAINE HYDROCHLORIDE 20 ML: 10 INJECTION, SOLUTION EPIDURAL; INFILTRATION; INTRACAUDAL; PERINEURAL at 11:08

## 2018-01-16 RX ADMIN — LOSARTAN POTASSIUM 100 MG: 50 TABLET ORAL at 07:57

## 2018-01-16 RX ADMIN — Medication 5000 UNITS: at 11:08

## 2018-01-16 RX ADMIN — FENTANYL CITRATE 50 MCG: 50 INJECTION, SOLUTION INTRAMUSCULAR; INTRAVENOUS at 10:45

## 2018-01-16 RX ADMIN — HYDRALAZINE HYDROCHLORIDE 25 MG: 25 TABLET ORAL at 20:14

## 2018-01-16 RX ADMIN — SENNOSIDES AND DOCUSATE SODIUM 1 TABLET: 8.6; 5 TABLET ORAL at 20:14

## 2018-01-16 RX ADMIN — FENTANYL CITRATE 50 MCG: 50 INJECTION, SOLUTION INTRAMUSCULAR; INTRAVENOUS at 11:00

## 2018-01-16 RX ADMIN — HEPARIN SODIUM 2000 UNITS: 1000 INJECTION, SOLUTION INTRAVENOUS; SUBCUTANEOUS at 11:09

## 2018-01-16 RX ADMIN — AMLODIPINE BESYLATE 5 MG: 5 TABLET ORAL at 07:54

## 2018-01-16 RX ADMIN — METRONIDAZOLE 500 MG: 500 TABLET ORAL at 14:03

## 2018-01-16 ASSESSMENT — PAIN DESCRIPTION - DESCRIPTORS
DESCRIPTORS: SORE
DESCRIPTORS: SHARP;SHOOTING

## 2018-01-16 NOTE — PLAN OF CARE
Problem: Patient Care Overview  Goal: Plan of Care/Patient Progress Review  Outcome: No Change  VSS. Tylenol this AM for pain. Denies nausea. Held metoprolol this AM. 3rd pre-op scrub done. Pt went to IR to have HD catheter placed. Since return pt has not complained of pain/nausea. NPO. LE edema, L>R. Productive cough. Continue to monitor.     Transport called for pt to go to Dialysis.

## 2018-01-16 NOTE — PLAN OF CARE
Pt arrived from PACU around 1945. Lethargic, arouses to voice. VSS on 2L oxygen. Dermabond over previous PD catheter site CDI. BG check 137. Pain managed with Tylenol/Tramadol PRN. Has not been OOB yet. Getting oral and IV Abx. Plan for hemodialysis catheter placement and hemodialysis initiation tomorrow. NPO @ midnight. Scrub #1 of 3 completed. Continue to monitor and with POC.

## 2018-01-16 NOTE — OR NURSING
Pt was stable,alert,oriented,pain  Controlled and nausea free. . See Epic flow sheet for more details.

## 2018-01-16 NOTE — PROGRESS NOTES
INTERNAL MEDICINE PROGRESS NOTE    Murray Nicholson ([id number]) admitted on 1/7/2018 01/16/2018    Assessment & Plan: Murray Nicholson is a 63 year old male with history of T2DM, MGUS, HLD, HTN, ESRD (likely 2/2 DM,HTN) on PD, HFrEF (EF - 20-25% with severe global hypokinesis), anemia of chronic disease and gout on allopurinol and prednisone who is admitted for further treatment of bacterial peritonitis that has failed outpatient treatment. Patient hemodynamically stable, PD removal 1/15 with tunneled catheter placement 1/16.    Plan For Today:  -IR tunneled cath with dialysis after  -Discontinue vanc, continue other antimicrobials      # Bacterial peritonitis s/p treatment failure (9 day intraperitoneal course of Ceftazidime).  # ESRD 2/2 T2Dm, HTN - on PD  Patient with abdominal pain, cloudy dialysate and s/p 9 day course of intraperitoneal ceftazidime for suspected peritonitis. Cultures were done at [facility]. Per Nephrology note, peritoneal fluid notable for E.Coli,  Pseudomonas and Streptococcus. Now growing candida glabrata and bacteroides caccae on day 4. WBC in peritoneal fluid originally improving, but has now been increasing, concern for biofilm on PD catheter. WBC increased 1/16, no new signs/symptoms of infection, covering broadly already, will continue to monitor.  - PD catheter removal 1/15  - ID consulted, appreciate recs  - Antimicrobials:    -ceftazidime     -metronidazole    -vancomycin -> d/c 1/16    -micafungin    -gentamycin  - Tunneled catheter via IR 1/16 - will need 3 HD runs prior to discharge to outpatient dialysis  - Monitor vital signs closely  - Nephrology following, appreciate recs  - Continue PTA calcitriol, and phoslo      # Influenza  CXR on 1/11 showed right LL opacity.  - Continue flonase  - Tessalon  - Tamiflu completed      # Hypokalemia  - Replace PRN      # T2DM  Last A1C - 6.4 in 6/2017, but 9.1 on admission. On Glipizide PTA and no insulin. BG has been elevated this admission likely  due to acute infection, but based on his A1c his BG hasn't been well controlled in recent months.  - Hold Glipizide due to hypoglycemia risk. Will resume at discharge  - High dose SSI  - Glucose checks  - Hypoglycemia protocol      #HFrEF (EF 20-25% on 4/2017 TTE) - Stage C, NYHA class III  #CAD  #HTN  #HLD  #Ascending aortic aneurysm  No cardiorespiratory symptoms on presentation. On Losartan, Toprol and ASA. Last Echo 4/2017 with  coronary angiogram yet to be done, although ordered. Ascending aortic aneurysm 4.8cm as at last check 8/2/2017.   - Continue PTA ASA 81mg daily  - Continue PTA Losartan 100mg daily  - Continue  PTA Toprol XL 100mg daily - held 1/15 due to soft BP  - Continue PTA Torsemide 100mg BID (was on bumex but currently on torsemide which is a recent change)  - Continue PTA Atorvastatin 40mg daily  - Continue PTA Amlodipine 5 mg daily  - C[facility] clinic referral on d/c  - Continue  PTA spironolactone 50mg daily   - Restart PTA Hydralazine at a lower dose 25mg TID (50mg TID at home)  - Hold Imdur      CHRONIC PROBLEMS  # Gout. Stable. Continue Allopurinol, prednisone (patient reports he gets frequent flares)  # MGUS. Stable.  # Anemia of chronic disease: Stable    # GERD. Stable. On Omeprazole       Diet: normal  Fluids: none  DVT Prophylaxis: Pneumatic Compression Devices, ambulate   Code Status: Full Code     Disposition Plan: Expected discharge after completed necessary HD runs.     Patient seen and discussed with attending provider Dr. Aguaoy.     Markell Gonzalez  Internal Medicine PGY2    ==================================================================    Subjective:  No acute events overnight, had tunneled catheter placed this AM, mild abdominal pain unchanged, no fevers or chills. Cough has greatly improved. Feeling hungry.    ROS: negative other than listed above    Objective:  Most recent vital signs:  /74  Pulse 92  Temp 98  F (36.7  C) (Oral)  Resp 16  Wt 91.6 kg (201 lb 14.4 oz)   SpO2 99%  BMI 29.82 kg/m2  Temp:  [97  F (36.1  C)-98.4  F (36.9  C)] 98  F (36.7  C)  Pulse:  [85-96] 92  Heart Rate:  [86-95] 93  Resp:  [13-20] 16  BP: ()/(62-80) 111/74  MAP:  [71 mmHg-96 mmHg] 88 mmHg  Arterial Line BP: (121-149)/(47-67) 139/62  SpO2:  [89 %-100 %] 99 %  Wt Readings from Last 2 Encounters:   01/16/18 91.6 kg (201 lb 14.4 oz)   12/15/17 90.3 kg (199 lb)     Intake/Output Summary (Last 24 hours) at 01/16/18 1617  Last data filed at 01/16/18 0200   Gross per 24 hour   Intake              500 ml   Output              176 ml   Net              324 ml     Physical exam:  General: Patient lying comfortably in bed, getting dialysis, NAD  HEENT: EOMI, no scleral icterus or injection, no JVD, MMM  Cardiac: RRR, no m/r/g appreciated.   Respiratory: CTAB, no wheezes, rhonchi or crackles appreciated.  GI: mild diffuse tenderness to palpation without rebound or guarding - similar, normoactive bowel sounds  Extremities: No LE edema  Skin: No acute lesions appreciated  Neuro: AOx3, CN II-XII grossly intact, fluent speech    Results for SANCHEZ MCNEIL (MRN [id number]) as of 1/16/2018 16:17   Ref. Range 1/16/2018 04:35   Sodium Latest Ref Range: 133 - 144 mmol/L 137   Potassium Latest Ref Range: 3.4 - 5.3 mmol/L 4.2   Chloride Latest Ref Range: 94 - 109 mmol/L 97   Carbon Dioxide Latest Ref Range: 20 - 32 mmol/L 31   Urea Nitrogen Latest Ref Range: 7 - 30 mg/dL 44 (H)   Creatinine Latest Ref Range: 0.66 - 1.25 mg/dL 8.69 (H)   GFR Estimate Latest Ref Range: >60 mL/min/1.7m2 6 (L)   GFR Estimate If Black Latest Ref Range: >60 mL/min/1.7m2 8 (L)   Calcium Latest Ref Range: 8.5 - 10.1 mg/dL 8.3 (L)   Anion Gap Latest Ref Range: 3 - 14 mmol/L 9   Glucose Latest Ref Range: 70 - 99 mg/dL 102 (H)   WBC Latest Ref Range: 4.0 - 11.0 10e9/L 19.9 (H)   Hemoglobin Latest Ref Range: 13.3 - 17.7 g/dL 9.2 (L)   Hematocrit Latest Ref Range: 40.0 - 53.0 % 29.6 (L)   Platelet Count Latest Ref Range: 150 - 450 10e9/L 340    RBC Count Latest Ref Range: 4.4 - 5.9 10e12/L 2.99 (L)   MCV Latest Ref Range: 78 - 100 fl 99   MCH Latest Ref Range: 26.5 - 33.0 pg 30.8   MCHC Latest Ref Range: 31.5 - 36.5 g/dL 31.1 (L)   RDW Latest Ref Range: 10.0 - 15.0 % 17.2 (H)   INR Latest Ref Range: 0.86 - 1.14  1.18 (H)

## 2018-01-16 NOTE — PROGRESS NOTES
Nephrology Progress Note  01/17/2018         Assessment & Recommendations:   Murray Nicholson is a 63 year old male with a PMH of ESRD sec to likely DMII and HTN , CAD , HFrEF 20-25%, HLD, MGUS, SHEELA , AAA, Gout on prednisone and allopurinol, GERD, admitted with peritonitis diagnosed 12/29 outpatient and was treated with ceftazidime 1500mg intraperitoneal, presents with cloudy PD fluid , fatigue and abdominal pain.  Nephrology following for ESRD mangement and peritoneal infection.    1. Peritonitis with polymicrobial infection  Cultures have now been positive for pseudomonas, ecoli, strep, candida and Bacteroides implying intrabd source although abd CT and surgery assessments have been negative.   Grossly cloudy PD fluid on admission cell counts >4000 ->decreased to 457->109->874->2527->268 although last sample may have been diluted. Antibiotic coverage now broadened to ceftazidine 1.5 g ip qd, vanco ip to maintain trough >20 (received dose 1/12), gentamicin started 1/12 level 5.2 yesterday, and now metronidazole 500 mg po qid and micafungin 100 mg q24h.   Based on the data he has failed treatment (twice) and it is time to remove PD catheter and transition to hemodialysis for a few weeks.  PD catheter will be removed today, tunnel catheter will be placed for initiation of hemodialysis.  Will order HD tomorrow a.m. we will obtain consent for.   Continue ceftaz, vanco, gent, metronidazole and micafungin - redose vanc and gent based on levels, we need to consult pharmacy to reduce all the antibiotic according to hemodialysis.  Discontinue PD abx and change to IV and PO.       2. ESRD on PD since 06/2017  [facility] under Dr Mcpherson's care  PD prescription: 8hrs , 4 cycles , FV 2000, 2hr dwell , dextrose variable  UF on average 300-900  EDW 86Kg (190lbs)  Kt/V 1.5 PTA  Net UF about 700 ml  PD catheter will be removed today, tunnel catheter would be placed tomorrow, we will initiate hemodialysis tomorrow.  Tunnel  cathter placed , plan for HD today       Electrolytes  Hypokalemia  Repleted with potassium 40meq on 1/10.  Resolved now.      MBD  Phos 3.3, (10/17)  Calcium 8.5, albumin 1.8  On phoslo and Vit D      Anemia  Hb 9.6  On EPO 57292 units Qmonthly  Transfuse if <7  Iron replete per PTA labs       3. Influenza with pneumonia - bilateral infiltrates on chest CT    Recommendations were communicated to primary team in person.      Patient seen and discussed  with Dr. Tena.  Bouchra Saunders  Nephrology Fellow  Pager: [phone]      Interval History :   Feels fine denies any complaint tunnel cathter placed , tolerating his dialysis run denies any complaint.     Review of Systems:   I reviewed the following systems:  GI: Good appetite. - large BM Friday(was constipated)  Neuro:  -confusion  Constitutional:  -fever -chills  CV: -dyspnea +edema.  - chest pain.    Physical Exam:   I/O last 3 completed shifts:  In: 300 [P.O.:250; I.V.:50]  Out: 2575 [Urine:75; Other:2500]   /65  Pulse 92  Temp 98.3  F (36.8  C) (Oral)  Resp 16  Wt 91.6 kg (201 lb 14.4 oz)  SpO2 95%  BMI 29.82 kg/m2     GENERAL APPEARANCE: NAD  EYES:  No scleral icterus, pupils equal  HENT: mouth without ulcers or lesions  PULM: good ae bilat - bilateral crackles bases  CV: regular rhythm, KELLY present ? gallop     -JVD -  Not seen      -edema bilateral 2+  GI: soft, mildly tender, slightly distended, bowel sounds are present  INTEGUMENT: no cyanosis, no rash  NEURO:  no asterixis      Labs:   All labs reviewed by me  Electrolytes/Renal -   Recent Labs   Lab Test  01/16/18   0435  01/14/18   0749  01/13/18   0956  01/12/18   0742   11/21/17   1239  11/15/17   0758  11/14/17   0645   NA  137  135  137  137   < >  142  144  141   POTASSIUM  4.2  3.5  3.9  3.5   < >  4.6  3.2*  3.7   CHLORIDE  97  96  97  98   < >  102  103  103   CO2  31  31  32  32   < >  29  30  27   BUN  44*  39*  39*  38*   < >  66*  69*  76*   CR  8.69*  8.18*  8.52*   8.27*   < >  7.65*  6.98*  6.85*   GLC  102*  260*  211*  250*   < >  189*  294*  312*   TRINI  8.3*  8.5  8.6  8.4*   < >  8.9  8.9  8.9   MAG   --    --   1.8  1.7   --   1.9  1.9  1.8   PHOS   --    --    --   3.3   --    --   5.0*  4.3    < > = values in this interval not displayed.       CBC -   Recent Labs   Lab Test  01/16/18   0435  01/14/18   0749  01/13/18   0956   WBC  19.9*  5.6  5.4   HGB  9.2*  9.6*  9.9*   PLT  340  337  344       LFTs -   Recent Labs   Lab Test  01/07/18   1655  11/13/17   1709  08/02/17   1311  07/05/17   1204   ALKPHOS  76  104   --   98   BILITOTAL  0.4  0.8   --   0.5   ALT  24  26   --   15   AST  32  22   --   13   PROTTOTAL  6.0*  6.0*   --   6.2*   ALBUMIN  1.8*  2.3*  3.2*  2.7*       Iron Panel -   Recent Labs   Lab Test  07/19/17   1306  07/05/17   1204  06/21/17   1058   IRON  46  26*  69   IRONSAT  18  12*  29   ALBERTINA  369  542*  557*       Current Medications:    heparin Lock (1000 units/mL High concentration)  3 mL Intracatheter Once     heparin Lock (1000 units/mL High concentration)  3 mL Intracatheter Once     gentamicin (GARAMYCIN) place bui - receiving intermittent dosing  1 each Does not apply See Admin Instructions     cefTAZidime  1 g Intravenous Q24H     micafungin  100 mg Intravenous Q24H     metroNIDAZOLE  500 mg Oral Q6H JEAN     insulin aspart  1-10 Units Subcutaneous TID AC     insulin aspart  1-7 Units Subcutaneous At Bedtime     allopurinol  400 mg Oral Daily     hydrALAZINE  25 mg Oral TID     torsemide  100 mg Oral BID     predniSONE  5 mg Oral Daily     senna-docusate  1 tablet Oral BID     spironolactone  50 mg Oral Daily     amLODIPine  5 mg Oral Daily     aspirin  81 mg Oral Daily     atorvastatin  40 mg Oral Daily     fluticasone  1-2 spray Both Nostrils Daily     calcitRIOL  0.25 mcg Oral Daily     calcium acetate  667 mg Oral TID w/meals     omeprazole  20 mg Oral Daily     losartan  100 mg Oral Daily     metoprolol succinate  100 mg Oral Daily        - MEDICATION INSTRUCTIONS -       Bouchra Saunders MD

## 2018-01-16 NOTE — ANESTHESIA POSTPROCEDURE EVALUATION
Patient: Murray Nicholson    Procedure(s):  Open Removal of Peritoneal Dialysis Catheter  - Wound Class: II-Clean Contaminated    Diagnosis:Right Infected Peritoneal Dialysis Catheter  Diagnosis Additional Information: No value filed.    Anesthesia Type:  No value filed.    Note:  Anesthesia Post Evaluation    Patient location during evaluation: PACU  Patient participation: Able to fully participate in evaluation  Level of consciousness: awake and alert  Pain management: adequate  Airway patency: patent  Cardiovascular status: hemodynamically stable  Respiratory status: acceptable  Hydration status: acceptable  PONV: none     Anesthetic complications: None          Last vitals:  Vitals:    01/15/18 1800 01/15/18 1815 01/15/18 1830   BP: 127/80  133/80   Pulse:      Resp: 14 16 18   Temp:      SpO2: 99% 99% 100%         Electronically Signed By: Christine Decker MD  January 15, 2018  6:53 PM

## 2018-01-16 NOTE — PLAN OF CARE
Problem: Patient Care Overview  Goal: Plan of Care/Patient Progress Review  Outcome: Improving  Pt AVSS. Pt sleeping at long intervals w/o c/o's. Pt lungs with fine crax noted. Strong moist prod cough-yellow sputum. Pt remains in Isolation for contact and droplet precautions. Abd dist, +bs. No nausea. NPO since 0000 for IR in am. HD catheter to be placed. Piv right arm intact and saline locked. Pt voiding spont x1=75cc. Pt cont with sched flagyl as ordered. 0200 gluc=99. Pt with LE edema L>R. Cont to maintain NPO. Monitor for pain control.     Addendum: Preop Scrub #2 done. No c/o's from pt. Pt wouldn't mind BS report.

## 2018-01-16 NOTE — PHARMACY-AMINOGLYCOSIDE DOSING SERVICE
Pharmacy Aminoglycoside Follow-Up Note  Date of Service 2018  Patient's  1955   63 year old, male    Weight : 86 kg (EDW per nephrology)    Indication: Peritonitis in patient with PD cather for peritoneal dialysis  Current Gentamicin regimen: Dosed intermittently pending levels (Gentamicin 180mg IV given  @ 1919)  Day of therapy: 5    Target goals based on conventional dosing  Goal Peak level: 6-8 mg/L  Goal Trough level: </= 1 mg/L    Current estimated CrCl: CrCl cannot be calculated (Unknown ideal weight.).    Creatinine for last 3 days  2018:  7:49 AM Creatinine 8.18 mg/dL  2018:  4:35 AM Creatinine 8.69 mg/dL - starting hemodialysis    Nephrotoxins and other renal medications (Future)    Start     Dose/Rate Route Frequency Ordered Stop    18 2000  vancomycin (VANCOCIN) 1,250 mg in NaCl 0.9 % 250 mL intermittent infusion      1,250 mg  over 90 Minutes Intravenous ONCE 18 1050      18 1800  gentamicin (GARAMYCIN) 200 mg in NaCl 0.9 % 50 mL intermittent infusion      200 mg  over 60 Minutes Intravenous ONCE 18 1515      18 1513  gentamicin (GARAMYCIN) place bui - receiving intermittent dosing      1 each Does not apply SEE ADMIN INSTRUCTIONS 18 1515      18 0725  vancomycin place bui - receiving intermittent dosing      1 each Does not apply SEE ADMIN INSTRUCTIONS 18 0727      01/10/18 1600  torsemide (DEMADEX) tablet 100 mg      100 mg Oral 2 TIMES DAILY. 01/10/18 1157            Contrast Orders - past 72 hours     None        Aminoglycoside Levels - past 2 days  1/15/2018:  8:15 AM Gentamicin Level 2.6 mg/L (See note from 1/15 for evaluation of this level)    Aminoglycosides IV Administrations (past 72 hours)      No aminoglycosides orders with administrations in past 72 hours.              Interpretation of levels and current regimen:  See note from 1/15/17 for evaluation of level from 1/15/17.  Has serum creatinine changed  greater than 50% in the last 72 hours: No  Urine output:  diminished urine output  Renal function: ESRD on Dialysis     Plan  1. The estimated gentamicin level post-2hr run of hemodialysis today is ~ 1mg/L (Estimated Ke for patient from previous kinetic studies for patient= 0.019 hr-1; population data with estimated Ke for hemodialysis of 0.19 hr-1). Will plan to give gentamicin 200mg IV post-hemodialysis today and check a random gentamicin level with AM labs on 1/17/17. Based on PK, estimated gentamicin level in AM on 1/17/17 is 5-6 mg/L.    2.  Method of evaluation: random gentamicin level pre-HD    Breann Cruz, Pharm.D., [facility]  Pager [phone]

## 2018-01-16 NOTE — PROGRESS NOTES
Interventional Radiology Pre-Procedure Sedation Assessment   Time of Assessment: 10:33 AM    Expected Level: Moderate Sedation    Indication: Sedation is required for the following type of Procedure: Venous    Sedation and procedural consent: Risks, benefits and alternatives were discussed with Patient    PO Intake: Appropriately NPO for procedure    ASA Class: Class 3 - SEVERE SYSTEMIC DISEASE, DEFINITE FUNCTIONAL LIMITATIONS.    Mallampati: Grade 2:  Soft palate, base of uvula, tonsillar pillars, and portion of posterior pharyngeal wall visible    Lungs: Lungs Clear with good breath sounds bilaterally    Heart: Normal heart sounds and rate    History and physical reviewed and no updates needed. I have reviewed the lab findings, diagnostic data, medications, and the plan for sedation. I have determined this patient to be an appropriate candidate for the planned sedation and procedure and have reassessed the patient IMMEDIATELY PRIOR to sedation and procedure.    Amaury Lerma MD

## 2018-01-16 NOTE — PHARMACY-VANCOMYCIN DOSING SERVICE
Pharmacy Vancomycin Note  Date of Service 2018  Patient's  1955   63 year old, male    Indication: peritonitis  Goal Trough Level: 15-20 mg/L  Day of Therapy: 0  Current Vancomycin regimen:  Intermittent dosing    Current estimated CrCl = CrCl cannot be calculated (Unknown ideal weight.).    Creatinine for last 3 days  2018:  7:49 AM Creatinine 8.18 mg/dL  2018:  4:35 AM Creatinine 8.69 mg/dL    Recent Vancomycin Levels (past 3 days)  2018:  2:40 PM Vancomycin Level 28.8 mg/L  1/15/2018:  8:15 AM Vancomycin Level 22.5 mg/L    Vancomycin IV Administrations (past 72 hours)      No vancomycin orders with administrations in past 72 hours.                Nephrotoxins and other renal medications (Future)    Start     Dose/Rate Route Frequency Ordered Stop    18  vancomycin (VANCOCIN) 1,250 mg in NaCl 0.9 % 250 mL intermittent infusion      1,250 mg  over 90 Minutes Intravenous ONCE 18 1050      18 0725  vancomycin place bui - receiving intermittent dosing      1 each Does not apply SEE ADMIN INSTRUCTIONS 18 0727      01/10/18 1600  torsemide (DEMADEX) tablet 100 mg      100 mg Oral 2 TIMES DAILY. 01/10/18 1157               Contrast Orders - past 72 hours     None          Interpretation of levels and current regimen:  Based on estimated clearance, patient's baseline ke is 0.006 hr-1, estimated ke for hemodialysis is 0.1hr-1, estimated dry weight per nephrology is 86kg  Patient's estimated post-hemodialysis level is 15.5mg/L    Has serum creatinine changed > 50% in last 72 hours: No    Urine output: makes some urine at baseline, PD catheter removed 1/15    Renal Function: ESRD on Dialysis    Plan:  1.  Give one-time dose of 1250mg (~15mg/kg) tonight after dialysis  2.  Pharmacy will check random level  with AM labs. Estimated level  based on pharmacokinetics is 28mg/L  3. Serum creatinine levels will be ordered daily for the first week of therapy  and at least twice weekly for subsequent weeks.      Ragini Marcial, PharmD  [phone]        .

## 2018-01-16 NOTE — PROCEDURES
Interventional Radiology Brief Post Procedure Note    Procedure: IR CVC TUNNEL PLACEMENT > 5 YRS OF AGE    Proceduralist: KATHRYN Ceballos PA-C    Assistant: None    Time Out: Prior to the start of the procedure and with procedural staff participation, I verbally confirmed the patient s identity using two indicators, relevant allergies, that the procedure was appropriate and matched the consent or emergent situation, and that the correct equipment/implants were available. Immediately prior to starting the procedure I conducted the Time Out with the procedural staff and re-confirmed the patient s name, procedure, and site/side. (The Joint Commission universal protocol was followed.)  Yes    Medications   Medication Event Details Admin User Admin Time   midazolam (VERSED) injection 0.5-1 mg Medication Given Dose: 2 mg; Route: Intravenous Ariadna Quinn RN 1/16/2018 10:45 AM   fentaNYL (PF) (SUBLIMAZE) injection 25-50 mcg Medication Given Dose: 50 mcg; Route: Intravenous Ariadna Quinn RN 1/16/2018 10:45 AM   clindamycin (CLEOCIN) infusion 900 mg Medication New Bag Dose: 900 mg; Rate: 50 mL/hr; Route: Intravenous; Scheduled Time: 10:45 AM Ariadna Quinn RN 1/16/2018 10:45 AM   fentaNYL (PF) (SUBLIMAZE) injection 25-50 mcg Medication Given Dose: 50 mcg; Route: Intravenous Ariadna Quinn RN 1/16/2018 11:00 AM   lidocaine 1 % 1-30 mL Medication Given by Other Dose: 20 mL; Route: Intradermal Ariadna Quinn RN 1/16/2018 11:08 AM   heparin 5,000 units in 0.9% sodium chloride 1000 mL Medication New Bag by Other Clinician Dose: 5,000 Units; Route: TABLE SOLN Ariadna Quinn RN 1/16/2018 11:08 AM   heparin Lock (1000 units/mL High concentration) 3,000 Units Medication Given by Other Dose: 2,000 Units; Route: Intracatheter; Scheduled Time: 11:15 AM Ariadna Quinn RN 1/16/2018 11:08 AM   heparin Lock (1000 units/mL High concentration) 3,000 Units Medication Given by Other Dose: 2,000 Units; Route:  Intracatheter; Scheduled Time: 11:15 AM Ariadna Quinn, RN 1/16/2018 11:09 AM       Sedation: IR Nurse Monitored Care   Post Procedure Summary:  Prior to the start of the procedure and with procedural staff participation, I verbally confirmed the patient s identity using two indicators, relevant allergies, that the procedure was appropriate and matched the consent or emergent situation, and that the correct equipment/implants were available. Immediately prior to starting the procedure I conducted the Time Out with the procedural staff and re-confirmed the patient s name, procedure, and site/side. (The Joint Commission universal protocol was followed.)  Yes       Sedatives: Fentanyl and Midazolam (Versed)    Vital signs, airway and pulse oximetry were monitored and remained stable throughout the procedure and sedation was maintained until the procedure was complete.  The patient was monitored by staff until sedation discharge criteria were met.    Patient tolerance: Patient tolerated the procedure well with no immediate complications.    Time of sedation in minutes: 30 Minutes minutes from beginning to end of physician one to one monitoring.          Findings: Completed placement of 14.5 Tristanian 23 cm dual lumen, Palindrome brand, tunneled central venous access catheter via RIJV. Tip lying in the right atrium. Catheter okay to use immediately. Dx: Hemodialysis status. Tegan. 30 2 100 <1    Estimated Blood Loss: Less than 10 ml    Fluoroscopy Time:  minute(s)    SPECIMENS: None    Complications: 1. None     Condition: Stable    Plan:     Comments: See dictated procedure note for full details.    Antony Javed PA-C

## 2018-01-16 NOTE — PROGRESS NOTES
Patient Name: Murray Nicholson  Medical Record Number: [id number]  Today's Date: 1/16/2018    Procedure: CVC tunnel line   Proceduralist: Sukumar Nieves PA-C    Sedation start time: 1046  Sedation end time: 1100  Sedation medications administered: 2mg versed 100mcg fentanyl       Procedure start time: 1054  Puncture time: 1055      Report given to: Ariadna JIMENEZ 7C      Pt arrived to IR room 1 from [id number]. Consent obtained.Pt denies any questions or concerns regarding procedure. Pt positioned supine and monitored per protocol. Pt tolerated procedure without any noted complications.Both lumens locked with heparin 2,000 units  by provider and ready for use. Pt transferred back to

## 2018-01-17 LAB
ANION GAP SERPL CALCULATED.3IONS-SCNC: 8 MMOL/L (ref 3–14)
BUN SERPL-MCNC: 34 MG/DL (ref 7–30)
CALCIUM SERPL-MCNC: 8.7 MG/DL (ref 8.5–10.1)
CHLORIDE SERPL-SCNC: 101 MMOL/L (ref 94–109)
CO2 SERPL-SCNC: 28 MMOL/L (ref 20–32)
COPATH REPORT: NORMAL
CREAT SERPL-MCNC: 7.08 MG/DL (ref 0.66–1.25)
ERYTHROCYTE [DISTWIDTH] IN BLOOD BY AUTOMATED COUNT: 17.2 % (ref 10–15)
GENTAMICIN SERPL-MCNC: 8.2 MG/L
GFR SERPL CREATININE-BSD FRML MDRD: 8 ML/MIN/1.7M2
GLUCOSE BLDC GLUCOMTR-MCNC: 102 MG/DL (ref 70–99)
GLUCOSE BLDC GLUCOMTR-MCNC: 157 MG/DL (ref 70–99)
GLUCOSE BLDC GLUCOMTR-MCNC: 232 MG/DL (ref 70–99)
GLUCOSE SERPL-MCNC: 122 MG/DL (ref 70–99)
HCT VFR BLD AUTO: 29.8 % (ref 40–53)
HGB BLD-MCNC: 9.1 G/DL (ref 13.3–17.7)
LACTATE BLD-SCNC: 0.8 MMOL/L (ref 0.7–2)
MCH RBC QN AUTO: 30.3 PG (ref 26.5–33)
MCHC RBC AUTO-ENTMCNC: 30.5 G/DL (ref 31.5–36.5)
MCV RBC AUTO: 99 FL (ref 78–100)
PLATELET # BLD AUTO: 324 10E9/L (ref 150–450)
POTASSIUM SERPL-SCNC: 3.9 MMOL/L (ref 3.4–5.3)
RBC # BLD AUTO: 3 10E12/L (ref 4.4–5.9)
SODIUM SERPL-SCNC: 138 MMOL/L (ref 133–144)
WBC # BLD AUTO: 13.5 10E9/L (ref 4–11)

## 2018-01-17 PROCEDURE — 25000132 ZZH RX MED GY IP 250 OP 250 PS 637: Performed by: INTERNAL MEDICINE

## 2018-01-17 PROCEDURE — 00000146 ZZHCL STATISTIC GLUCOSE BY METER IP

## 2018-01-17 PROCEDURE — 25000128 H RX IP 250 OP 636: Performed by: STUDENT IN AN ORGANIZED HEALTH CARE EDUCATION/TRAINING PROGRAM

## 2018-01-17 PROCEDURE — 80170 ASSAY OF GENTAMICIN: CPT | Performed by: PEDIATRICS

## 2018-01-17 PROCEDURE — 25000132 ZZH RX MED GY IP 250 OP 250 PS 637: Performed by: STUDENT IN AN ORGANIZED HEALTH CARE EDUCATION/TRAINING PROGRAM

## 2018-01-17 PROCEDURE — 25000132 ZZH RX MED GY IP 250 OP 250 PS 637: Performed by: HOSPITALIST

## 2018-01-17 PROCEDURE — 27210995 ZZH RX 272: Performed by: STUDENT IN AN ORGANIZED HEALTH CARE EDUCATION/TRAINING PROGRAM

## 2018-01-17 PROCEDURE — 40000556 ZZH STATISTIC PERIPHERAL IV START W US GUIDANCE

## 2018-01-17 PROCEDURE — 85027 COMPLETE CBC AUTOMATED: CPT | Performed by: PEDIATRICS

## 2018-01-17 PROCEDURE — 80048 BASIC METABOLIC PNL TOTAL CA: CPT | Performed by: PEDIATRICS

## 2018-01-17 PROCEDURE — 83605 ASSAY OF LACTIC ACID: CPT | Performed by: INTERNAL MEDICINE

## 2018-01-17 PROCEDURE — 99233 SBSQ HOSP IP/OBS HIGH 50: CPT | Mod: GC | Performed by: PEDIATRICS

## 2018-01-17 PROCEDURE — 25000128 H RX IP 250 OP 636: Performed by: INTERNAL MEDICINE

## 2018-01-17 PROCEDURE — 12000001 ZZH R&B MED SURG/OB UMMC

## 2018-01-17 PROCEDURE — 36415 COLL VENOUS BLD VENIPUNCTURE: CPT | Performed by: PEDIATRICS

## 2018-01-17 PROCEDURE — 25000131 ZZH RX MED GY IP 250 OP 636 PS 637: Performed by: INTERNAL MEDICINE

## 2018-01-17 PROCEDURE — 90937 HEMODIALYSIS REPEATED EVAL: CPT

## 2018-01-17 RX ORDER — GENTAMICIN SULFATE 100 MG/100ML
100 INJECTION, SOLUTION INTRAVENOUS ONCE
Status: DISCONTINUED | OUTPATIENT
Start: 2018-01-17 | End: 2018-01-17

## 2018-01-17 RX ADMIN — HYDRALAZINE HYDROCHLORIDE 25 MG: 25 TABLET ORAL at 21:27

## 2018-01-17 RX ADMIN — FLUTICASONE PROPIONATE 1 SPRAY: 50 SPRAY, METERED NASAL at 08:48

## 2018-01-17 RX ADMIN — SPIRONOLACTONE 50 MG: 25 TABLET ORAL at 08:46

## 2018-01-17 RX ADMIN — TORSEMIDE 100 MG: 100 TABLET ORAL at 16:33

## 2018-01-17 RX ADMIN — MICAFUNGIN SODIUM 100 MG: 10 INJECTION, POWDER, LYOPHILIZED, FOR SOLUTION INTRAVENOUS at 22:12

## 2018-01-17 RX ADMIN — TORSEMIDE 100 MG: 100 TABLET ORAL at 08:45

## 2018-01-17 RX ADMIN — METOPROLOL SUCCINATE 100 MG: 100 TABLET, EXTENDED RELEASE ORAL at 08:46

## 2018-01-17 RX ADMIN — SODIUM CHLORIDE 250 ML: 9 INJECTION, SOLUTION INTRAVENOUS at 17:20

## 2018-01-17 RX ADMIN — HYDRALAZINE HYDROCHLORIDE 25 MG: 25 TABLET ORAL at 08:46

## 2018-01-17 RX ADMIN — CALCIUM ACETATE 667 MG: 667 CAPSULE ORAL at 21:27

## 2018-01-17 RX ADMIN — INSULIN ASPART 1 UNITS: 100 INJECTION, SOLUTION INTRAVENOUS; SUBCUTANEOUS at 21:24

## 2018-01-17 RX ADMIN — CALCITRIOL 0.25 MCG: 0.25 CAPSULE, LIQUID FILLED ORAL at 08:46

## 2018-01-17 RX ADMIN — CEFTAZIDIME 1 G: 2 INJECTION, POWDER, FOR SOLUTION INTRAVENOUS at 22:12

## 2018-01-17 RX ADMIN — LOSARTAN POTASSIUM 100 MG: 50 TABLET ORAL at 08:46

## 2018-01-17 RX ADMIN — Medication: at 17:20

## 2018-01-17 RX ADMIN — METRONIDAZOLE 500 MG: 500 TABLET ORAL at 21:27

## 2018-01-17 RX ADMIN — AMLODIPINE BESYLATE 5 MG: 5 TABLET ORAL at 08:46

## 2018-01-17 RX ADMIN — METRONIDAZOLE 500 MG: 500 TABLET ORAL at 08:46

## 2018-01-17 RX ADMIN — ASPIRIN 81 MG CHEWABLE TABLET 81 MG: 81 TABLET CHEWABLE at 08:46

## 2018-01-17 RX ADMIN — CALCIUM ACETATE 667 MG: 667 CAPSULE ORAL at 13:46

## 2018-01-17 RX ADMIN — PREDNISONE 5 MG: 2.5 TABLET ORAL at 08:45

## 2018-01-17 RX ADMIN — CALCIUM ACETATE 667 MG: 667 CAPSULE ORAL at 08:44

## 2018-01-17 RX ADMIN — INSULIN ASPART 4 UNITS: 100 INJECTION, SOLUTION INTRAVENOUS; SUBCUTANEOUS at 13:48

## 2018-01-17 RX ADMIN — METRONIDAZOLE 500 MG: 500 TABLET ORAL at 13:47

## 2018-01-17 RX ADMIN — ATORVASTATIN CALCIUM 40 MG: 40 TABLET, FILM COATED ORAL at 21:27

## 2018-01-17 RX ADMIN — OMEPRAZOLE 20 MG: 20 CAPSULE, DELAYED RELEASE ORAL at 08:46

## 2018-01-17 RX ADMIN — SENNOSIDES AND DOCUSATE SODIUM 1 TABLET: 8.6; 5 TABLET ORAL at 21:27

## 2018-01-17 RX ADMIN — POLYETHYLENE GLYCOL 3350 17 G: 17 POWDER, FOR SOLUTION ORAL at 09:53

## 2018-01-17 RX ADMIN — HYDRALAZINE HYDROCHLORIDE 25 MG: 25 TABLET ORAL at 13:47

## 2018-01-17 RX ADMIN — ACETAMINOPHEN 650 MG: 325 TABLET ORAL at 22:30

## 2018-01-17 RX ADMIN — METRONIDAZOLE 500 MG: 500 TABLET ORAL at 02:33

## 2018-01-17 RX ADMIN — SENNOSIDES AND DOCUSATE SODIUM 1 TABLET: 8.6; 5 TABLET ORAL at 08:46

## 2018-01-17 RX ADMIN — SODIUM CHLORIDE 1000 ML: 9 INJECTION, SOLUTION INTRAVENOUS at 17:20

## 2018-01-17 RX ADMIN — ALLOPURINOL 400 MG: 300 TABLET ORAL at 08:47

## 2018-01-17 RX ADMIN — TRAMADOL HYDROCHLORIDE 50 MG: 50 TABLET, COATED ORAL at 02:32

## 2018-01-17 ASSESSMENT — PAIN DESCRIPTION - DESCRIPTORS
DESCRIPTORS: SORE
DESCRIPTORS: ACHING

## 2018-01-17 NOTE — PROGRESS NOTES
[facility], [place]    Internal Medicine Progress Note    Date of Service (when Attending saw the patient): 01/17/2018    Interval History   Patient triggered sepsis protocol this morning for  and WBC ~19, lactate was negative, patient asymptomatic. Repeat WBC count draw around 9 am was down to ~13. No acute events overnight per nursing. Patient got dialysis yesterday, states it went well.    Assessment & Plan   Medical Student Note Resident Note   Assessment & Plan: Murray Nicholson is a 63 year old male with history of T2DM, MGUS, HLD, HTN, ESRD (likely 2/2 DM,HTN) on PD, HFrEF (EF - 20-25% with severe global hypokinesis), anemia of chronic disease and gout on allopurinol and prednisone who is admitted for further treatment of bacterial peritonitis that has failed outpatient treatment. Patient hemodynamically stable, PD removal 1/15 with tunneled catheter placement 1/16.     Plan For Today:  - Dialysis tomorrow  -Discontinue vanc, continue other antimicrobials      # Bacterial peritonitis s/p treatment failure (9 day intraperitoneal course of Ceftazidime).  # ESRD 2/2 T2Dm, HTN - on PD  Patient with abdominal pain, cloudy dialysate and s/p 9 day course of intraperitoneal ceftazidime for suspected peritonitis. Cultures were done at [facility]. Per Nephrology note, peritoneal fluid notable for E.Coli,  Pseudomonas and Streptococcus. Grew candida glabrata and bacteroides caccae on day 4. WBC increased 1/16, back down on 1/17 now that PD cath is out. No new signs/symptoms of infection, covering broadly already, will continue to monitor.  - PD catheter removal was 1/15  - ID consulted, appreciate recs  - Antimicrobials:    -ceftazidime     -metronidazole    -vancomycin -> d/c 1/16    -micafungin    -gentamycin  - Tunneled catheter via IR 1/16 - will need 3 HD runs prior to discharge to outpatient dialysis  - Monitor vital signs closely  - Nephrology following, appreciate recs  - Continue PTA  calcitriol, and phoslo      # Influenza  CXR on 1/11 showed right LL opacity.  - Continue flonase  - Tessalon  - Tamiflu completed      # Hypokalemia  - Replace PRN      # T2DM  Last A1C - 6.4 in 6/2017, but 9.1 on admission. On Glipizide PTA and no insulin. BG has been elevated this admission likely due to acute infection, but based on his A1c his BG hasn't been well controlled in recent months.  - Hold Glipizide due to hypoglycemia risk. Will resume at discharge  - High dose SSI  - Glucose checks  - Hypoglycemia protocol      #HFrEF (EF 20-25% on 4/2017 TTE) - Stage C, NYHA class III  #CAD  #HTN  #HLD  #Ascending aortic aneurysm  No cardiorespiratory symptoms on presentation. On Losartan, Toprol and ASA. Last Echo 4/2017 with  coronary angiogram yet to be done, although ordered. Ascending aortic aneurysm 4.8cm as at last check 8/2/2017.   - Continue PTA ASA 81mg daily  - Continue PTA Losartan 100mg daily  - Continue  PTA Toprol XL 100mg daily - held 1/15 due to soft BP  - Continue PTA Torsemide 100mg BID (was on bumex but currently on torsemide which is a recent change)  - Continue PTA Atorvastatin 40mg daily  - Continue PTA Amlodipine 5 mg daily  - CORE clinic referral on d/c  - Continue  PTA spironolactone 50mg daily   - Restart PTA Hydralazine at a lower dose 25mg TID (50mg TID at home)  - Hold Imdur      CHRONIC PROBLEMS  # Gout. Stable. Continue Allopurinol, prednisone (patient reports he gets frequent flares)  # MGUS. Stable.  # Anemia of chronic disease: Stable    # GERD. Stable. On Omeprazole       Diet: normal  Fluids: none  DVT Prophylaxis: Pneumatic Compression Devices, ambulate   Code Status: Full Code Assessment & Plan: Murray Nicholson is a 63 year old male with history of T2DM, MGUS, HLD, HTN, ESRD (likely 2/2 DM,HTN) on PD, HFrEF (EF - 20-25% with severe global hypokinesis), anemia of chronic disease and gout on allopurinol and prednisone who is admitted for further treatment of bacterial peritonitis  that has failed outpatient treatment. Patient hemodynamically stable, PD removal 1/15 with tunneled catheter placement 1/16.     Plan For Today:  -Discontinue gentamycin      # Bacterial peritonitis s/p treatment failure (9 day intraperitoneal course of Ceftazidime).  # ESRD 2/2 T2Dm, HTN - on PD  Patient with abdominal pain, cloudy dialysate and s/p 9 day course of intraperitoneal ceftazidime for suspected peritonitis. Cultures were done at [facility]. Per Nephrology note, peritoneal fluid notable for E.Coli,  Pseudomonas and Streptococcus. Now growing candida glabrata and bacteroides caccae on day 4. WBC in peritoneal fluid originally improving, but has now been increasing, concern for biofilm on PD catheter. WBC increased 1/16, no new signs/symptoms of infection, covering broadly already, will continue to monitor. Awaiting for yeast sensitivities.  - PD catheter removal 1/15  - ID consulted, appreciate recs  - Antimicrobials:    -ceftazidime     -metronidazole    -vancomycin -> d/c 1/16    -micafungin    -gentamycin -> d/c 1/17  - Tunneled catheter via IR 1/16 - will need 2-3 HD runs prior to discharge to outpatient dialysis  - Monitor vital signs closely  - Nephrology following, appreciate recs  - Continue PTA calcitriol, and phoslo      # Influenza  CXR on 1/11 showed right LL opacity.  - Continue flonase  - Tessalon  - Tamiflu completed      # Hypokalemia  - Replace PRN      # T2DM  Last A1C - 6.4 in 6/2017, but 9.1 on admission. On Glipizide PTA and no insulin. BG has been elevated this admission likely due to acute infection, but based on his A1c his BG hasn't been well controlled in recent months.  - Hold Glipizide due to hypoglycemia risk. Will resume at discharge  - High dose SSI  - Glucose checks  - Hypoglycemia protocol      #HFrEF (EF 20-25% on 4/2017 TTE) - Stage C, NYHA class III  #CAD  #HTN  #HLD  #Ascending aortic aneurysm  No cardiorespiratory symptoms on presentation. On Losartan, Toprol and ASA.  Last Echo 4/2017 with  coronary angiogram yet to be done, although ordered. Ascending aortic aneurysm 4.8cm as at last check 8/2/2017.   - Continue PTA ASA 81mg daily  - Continue PTA Losartan 100mg daily  - Continue  PTA Toprol XL 100mg daily - held 1/15 due to soft BP  - Continue PTA Torsemide 100mg BID (was on bumex but currently on torsemide which is a recent change)  - Continue PTA Atorvastatin 40mg daily  - Continue PTA Amlodipine 5 mg daily  - CORE clinic referral on d/c  - Continue  PTA spironolactone 50mg daily   - Restart PTA Hydralazine at a lower dose 25mg TID (50mg TID at home)  - Hold Imdur      CHRONIC PROBLEMS  # Gout. Stable. Continue Allopurinol, prednisone (patient reports he gets frequent flares)  # MGUS. Stable.  # Anemia of chronic disease: Stable    # GERD. Stable. On Omeprazole       Diet: normal  Fluids: none  DVT Prophylaxis: Pneumatic Compression Devices, ambulate   Code Status: Full Code      Disposition Plan: Expected discharge after completed necessary HD runs.      Patient seen and discussed with attending provider Dr. Agauyo.      Markell Gonzalez  Internal Medicine PGY2      Physical Exam (Student)  General: Patient lying comfortably in bed, getting dialysis, NAD  HEENT: EOMI, no scleral icterus or injection, no JVD, MMM  Cardiac: RRR, no m/r/g appreciated.   Respiratory: CTAB, no wheezes, rhonchi or crackles appreciated.  GI: mild diffuse tenderness to palpation without rebound or guarding - similar, normoactive bowel sounds  Extremities: No LE edema  Skin: No acute lesions appreciated  Neuro: AOx3, CN II-XII grossly intact, fluent speech    Data Interpretation  I have reviewed today's vital signs, medications, labs and imaging as stated above.   General: comfortable, lying in bed in NAD  HEENT: sclera anicteric, mucus membranes moist, no JVD  Lungs: clear bilaterally  CV: normal rate, regular rhythm, no m/r/g  Abd: soft, mildly tender to palpation without guarding or rebound  Ext:  no LE edema  Skin: no rashes or bruising  Neuro: alert and oriented, fluent speech, no apparent focal deficits    Labs/Imaging  Reviewed labs, imaging, vital signs, and medications in EPIC.   Binta Mendoza, MS4 Markell Gonzalez PGY2     Medications     - MEDICATION INSTRUCTIONS -         gentamicin  100 mg Intravenous Once     heparin Lock (1000 units/mL High concentration)  3 mL Intracatheter Once     heparin Lock (1000 units/mL High concentration)  3 mL Intracatheter Once     gentamicin (GARAMYCIN) place bui - receiving intermittent dosing  1 each Does not apply See Admin Instructions     cefTAZidime  1 g Intravenous Q24H     micafungin  100 mg Intravenous Q24H     metroNIDAZOLE  500 mg Oral Q6H JEAN     insulin aspart  1-10 Units Subcutaneous TID AC     insulin aspart  1-7 Units Subcutaneous At Bedtime     allopurinol  400 mg Oral Daily     hydrALAZINE  25 mg Oral TID     torsemide  100 mg Oral BID     predniSONE  5 mg Oral Daily     senna-docusate  1 tablet Oral BID     spironolactone  50 mg Oral Daily     amLODIPine  5 mg Oral Daily     aspirin  81 mg Oral Daily     atorvastatin  40 mg Oral Daily     fluticasone  1-2 spray Both Nostrils Daily     calcitRIOL  0.25 mcg Oral Daily     calcium acetate  667 mg Oral TID w/meals     omeprazole  20 mg Oral Daily     losartan  100 mg Oral Daily     metoprolol succinate  100 mg Oral Daily       Data     Recent Labs  Lab 01/17/18  0900 01/16/18  0435 01/14/18  0749   WBC 13.5* 19.9* 5.6   HGB 9.1* 9.2* 9.6*   MCV 99 99 99    340 337   INR  --  1.18*  --     137 135   POTASSIUM 3.9 4.2 3.5   CHLORIDE 101 97 96   CO2 28 31 31   BUN 34* 44* 39*   CR 7.08* 8.69* 8.18*   ANIONGAP 8 9 8   TRINI 8.7 8.3* 8.5   * 102* 260*       No results found for this or any previous visit (from the past 24 hour(s)).

## 2018-01-17 NOTE — PHARMACY-AMINOGLYCOSIDE DOSING SERVICE
Pharmacy Aminoglycoside Follow-Up Note  Date of Service 2018  Patient's  1955   63 year old, male    Weight (Actual): 86 kg (EDW per nephrology)    Indication: peritonitis (patient is post-PD catheter removal)  Current Gentamicin regimen:  Intermittent dosing, last dose 18 200mg post-HD  Day of therapy: 6    Target goals based on conventional dosing  Goal Peak level: 6-8 mg/L  Goal Trough level: </= 1 mg/L    Current estimated CrCl: CrCl cannot be calculated (Unknown ideal weight.).    Creatinine for last 3 days  2018:  4:35 AM Creatinine 8.69 mg/dL  2018:  9:00 AM Creatinine 7.08 mg/dL    Nephrotoxins and other renal medications (Future)    Start     Dose/Rate Route Frequency Ordered Stop    18 1513  gentamicin (GARAMYCIN) place bui - receiving intermittent dosing      1 each Does not apply SEE ADMIN INSTRUCTIONS 18 1515      01/10/18 1600  torsemide (DEMADEX) tablet 100 mg      100 mg Oral 2 TIMES DAILY. 01/10/18 1157            Contrast Orders - past 72 hours     None          Aminoglycoside Levels - past 2 days  2018:  9:00 AM Gentamicin Level 8.2 mg/L    Aminoglycosides IV Administrations (past 72 hours)                   gentamicin (GARAMYCIN) 200 mg in NaCl 0.9 % 50 mL intermittent infusion (mg) 200 mg New Bag 18                  Interpretation of levels and current regimen:  Aminoglycoside levels are within goal range    Has serum creatinine changed greater than 50% in the last 72 hours: No    Urine output:  diminished urine output    Renal function: ESRD on Dialysis    Plan   1. Give 100mg IV once after dialysis    2.  Estimated gentamicin level post-hemodialysis is ~3.5mg/L (Estimated Ke for patient from previous kinetic studies for patient= 0.019 hr-1; population data with estimated Ke for hemodialysis of 0.19 hr-1). Estimated gentamicin level in AM on 18 is 5-6 mg/L    3. Pharmacy will continue to follow and check a random gentamicin  level with AM labs on 1/18/18    Ragini Marcial, PharmD  [phone]

## 2018-01-17 NOTE — PROGRESS NOTES
"HEMODIALYSIS TREATMENT NOTE    Date: 1/16/2018  Time: 7:03 PM    Data:  Pre Wt: 91.6 kg (201 lb 15.1 oz)   Desired Wt: 89.6 kg   Post Wt:  88.6 kg  Ultrafiltration - Post Run Net Total Removed (mL): 2500 mL  Ultrafiltration - Post Run Net Total Gain (mL): 0 mL  Vascular Access Status: Yes, secured and visible  Dialyzer Rinse: Streaked  Total Blood Volume Processed: 42.9  Total Dialysis (Treatment) Time:  3 hours      Interventions/Assessment:  3 hour tx completed w/o difficulty. Removed 2.5L UF, however standing scale post tx read at -3kg from original weight. Pt a/o throughout tx, VSS. Pt educated regarding HD process and fluid gain/loss. Pt noted some light confusion and feeling \"foggy\" upon initiation, much improved post-tx. Pt ordered dinner to be sent to home unit. Hand-off report given to primary floor RN.     Plan:    Continue HD per renal team.     "

## 2018-01-17 NOTE — PLAN OF CARE
Problem: Infection, Risk/Actual (Adult)  Goal: Identify Related Risk Factors and Signs and Symptoms  Related risk factors and signs and symptoms are identified upon initiation of Human Response Clinical Practice Guideline (CPG).   Outcome: No Change  /79  Pulse 92  Temp 98  F (36.7  C) (Oral)  Resp 16  Wt 89.4 kg (197 lb 1.6 oz)  SpO2 99%  BMI 29.11 kg/m2    Responsible for pt from [id number]. Denies pain or nausea. Central line dialysis catheter intact. PIV saline locked. Abd inc from PD catheter OBED. Voiding spont. No BM this shift. Droplet precautions maintained. Pt now in dialysis. Up ad edith. Pt will not be on the floor for bedside report.

## 2018-01-17 NOTE — PLAN OF CARE
Problem: Patient Care Overview  Goal: Plan of Care/Patient Progress Review  Outcome: No Change  Droplet isolation. AVSS on RA. A+OX4. Pain controlled with prn tylenol and tramadol. Regular diet. CVC for HD c/d/i. Old PD site c/d/i. No void or BM overnight. UAL, steady on feet. PO ABX continue.   Continue POC. Patient OK with bedside report but does not want to be woken for it.

## 2018-01-17 NOTE — PLAN OF CARE
Problem: Patient Care Overview  Goal: Plan of Care/Patient Progress Review  Outcome: Improving  Patient's white count decreased from yesterday, today its 13.5. Per lab patient has yeast growing from peritoneal fluid collected on the 13th and 15th, MD text paged. Patient has a good appetite, declined pain meds. Blood sugars covered per sliding scale. Patient will have a dialysis run later this afternoon. Pt remains in droplet isolation. Patient triggered the sepsis protocol this morning, lactic acid was only 0.8.   Patient ok with bedside report unless sleeping.

## 2018-01-17 NOTE — PLAN OF CARE
Problem: Patient Care Overview  Goal: Plan of Care/Patient Progress Review  Outcome: No Change  Pt left for dialysis at 1500 and returned at 1715. AVSS. A&Ox4. Apical pulse regular. Lungs CTA. Abdominal incision c/d/i. Bowel sounds active in all quadrants. Tolerating reg diet. Voiding spontaneously with adequate output. Left PIV infusing. Pain controlled with tramadol. Continue with POC. Pt would not like bedside report if awake.

## 2018-01-18 LAB
ANION GAP SERPL CALCULATED.3IONS-SCNC: 7 MMOL/L (ref 3–14)
BACTERIA SPEC CULT: ABNORMAL
BACTERIA SPEC CULT: ABNORMAL
BUN SERPL-MCNC: 22 MG/DL (ref 7–30)
CALCIUM SERPL-MCNC: 8 MG/DL (ref 8.5–10.1)
CHLORIDE SERPL-SCNC: 105 MMOL/L (ref 94–109)
CO2 SERPL-SCNC: 26 MMOL/L (ref 20–32)
CREAT SERPL-MCNC: 4.76 MG/DL (ref 0.66–1.25)
ERYTHROCYTE [DISTWIDTH] IN BLOOD BY AUTOMATED COUNT: 17.3 % (ref 10–15)
GFR SERPL CREATININE-BSD FRML MDRD: 12 ML/MIN/1.7M2
GLUCOSE BLDC GLUCOMTR-MCNC: 129 MG/DL (ref 70–99)
GLUCOSE BLDC GLUCOMTR-MCNC: 169 MG/DL (ref 70–99)
GLUCOSE BLDC GLUCOMTR-MCNC: 180 MG/DL (ref 70–99)
GLUCOSE BLDC GLUCOMTR-MCNC: 200 MG/DL (ref 70–99)
GLUCOSE SERPL-MCNC: 128 MG/DL (ref 70–99)
HCT VFR BLD AUTO: 29.4 % (ref 40–53)
HGB BLD-MCNC: 9.1 G/DL (ref 13.3–17.7)
MCH RBC QN AUTO: 30.6 PG (ref 26.5–33)
MCHC RBC AUTO-ENTMCNC: 31 G/DL (ref 31.5–36.5)
MCV RBC AUTO: 99 FL (ref 78–100)
PLATELET # BLD AUTO: 295 10E9/L (ref 150–450)
POTASSIUM SERPL-SCNC: 3.7 MMOL/L (ref 3.4–5.3)
RBC # BLD AUTO: 2.97 10E12/L (ref 4.4–5.9)
SODIUM SERPL-SCNC: 138 MMOL/L (ref 133–144)
SPECIMEN SOURCE: ABNORMAL
WBC # BLD AUTO: 11.2 10E9/L (ref 4–11)

## 2018-01-18 PROCEDURE — 80048 BASIC METABOLIC PNL TOTAL CA: CPT | Performed by: STUDENT IN AN ORGANIZED HEALTH CARE EDUCATION/TRAINING PROGRAM

## 2018-01-18 PROCEDURE — 25000132 ZZH RX MED GY IP 250 OP 250 PS 637: Performed by: HOSPITALIST

## 2018-01-18 PROCEDURE — 99233 SBSQ HOSP IP/OBS HIGH 50: CPT | Mod: GC | Performed by: PEDIATRICS

## 2018-01-18 PROCEDURE — 25000132 ZZH RX MED GY IP 250 OP 250 PS 637: Performed by: INTERNAL MEDICINE

## 2018-01-18 PROCEDURE — 25000132 ZZH RX MED GY IP 250 OP 250 PS 637: Performed by: STUDENT IN AN ORGANIZED HEALTH CARE EDUCATION/TRAINING PROGRAM

## 2018-01-18 PROCEDURE — 25000131 ZZH RX MED GY IP 250 OP 636 PS 637: Performed by: INTERNAL MEDICINE

## 2018-01-18 PROCEDURE — 25000128 H RX IP 250 OP 636: Performed by: STUDENT IN AN ORGANIZED HEALTH CARE EDUCATION/TRAINING PROGRAM

## 2018-01-18 PROCEDURE — 00000146 ZZHCL STATISTIC GLUCOSE BY METER IP

## 2018-01-18 PROCEDURE — 25000128 H RX IP 250 OP 636: Performed by: INTERNAL MEDICINE

## 2018-01-18 PROCEDURE — 90937 HEMODIALYSIS REPEATED EVAL: CPT

## 2018-01-18 PROCEDURE — 85027 COMPLETE CBC AUTOMATED: CPT | Performed by: STUDENT IN AN ORGANIZED HEALTH CARE EDUCATION/TRAINING PROGRAM

## 2018-01-18 PROCEDURE — 27210995 ZZH RX 272: Performed by: STUDENT IN AN ORGANIZED HEALTH CARE EDUCATION/TRAINING PROGRAM

## 2018-01-18 PROCEDURE — 36415 COLL VENOUS BLD VENIPUNCTURE: CPT | Performed by: STUDENT IN AN ORGANIZED HEALTH CARE EDUCATION/TRAINING PROGRAM

## 2018-01-18 PROCEDURE — 12000001 ZZH R&B MED SURG/OB UMMC

## 2018-01-18 RX ADMIN — OMEPRAZOLE 20 MG: 20 CAPSULE, DELAYED RELEASE ORAL at 08:09

## 2018-01-18 RX ADMIN — CALCIUM ACETATE 667 MG: 667 CAPSULE ORAL at 15:01

## 2018-01-18 RX ADMIN — HYDRALAZINE HYDROCHLORIDE 25 MG: 25 TABLET ORAL at 15:01

## 2018-01-18 RX ADMIN — LOSARTAN POTASSIUM 100 MG: 50 TABLET ORAL at 08:11

## 2018-01-18 RX ADMIN — FLUTICASONE PROPIONATE 1 SPRAY: 50 SPRAY, METERED NASAL at 08:14

## 2018-01-18 RX ADMIN — CALCIUM ACETATE 667 MG: 667 CAPSULE ORAL at 08:11

## 2018-01-18 RX ADMIN — PREDNISONE 5 MG: 2.5 TABLET ORAL at 08:09

## 2018-01-18 RX ADMIN — POLYETHYLENE GLYCOL 3350 17 G: 17 POWDER, FOR SOLUTION ORAL at 08:20

## 2018-01-18 RX ADMIN — METRONIDAZOLE 500 MG: 500 TABLET ORAL at 15:01

## 2018-01-18 RX ADMIN — METOPROLOL SUCCINATE 100 MG: 100 TABLET, EXTENDED RELEASE ORAL at 08:11

## 2018-01-18 RX ADMIN — INSULIN ASPART 3 UNITS: 100 INJECTION, SOLUTION INTRAVENOUS; SUBCUTANEOUS at 15:01

## 2018-01-18 RX ADMIN — CEFTAZIDIME 1 G: 2 INJECTION, POWDER, FOR SOLUTION INTRAVENOUS at 21:12

## 2018-01-18 RX ADMIN — MICAFUNGIN SODIUM 100 MG: 10 INJECTION, POWDER, LYOPHILIZED, FOR SOLUTION INTRAVENOUS at 21:12

## 2018-01-18 RX ADMIN — SPIRONOLACTONE 50 MG: 25 TABLET ORAL at 08:09

## 2018-01-18 RX ADMIN — CALCIUM ACETATE 667 MG: 667 CAPSULE ORAL at 18:44

## 2018-01-18 RX ADMIN — TORSEMIDE 100 MG: 100 TABLET ORAL at 16:27

## 2018-01-18 RX ADMIN — INSULIN ASPART 2 UNITS: 100 INJECTION, SOLUTION INTRAVENOUS; SUBCUTANEOUS at 19:06

## 2018-01-18 RX ADMIN — SODIUM CHLORIDE 250 ML: 9 INJECTION, SOLUTION INTRAVENOUS at 11:38

## 2018-01-18 RX ADMIN — METRONIDAZOLE 500 MG: 500 TABLET ORAL at 21:12

## 2018-01-18 RX ADMIN — ALLOPURINOL 400 MG: 300 TABLET ORAL at 08:10

## 2018-01-18 RX ADMIN — ACETAMINOPHEN 650 MG: 325 TABLET ORAL at 16:28

## 2018-01-18 RX ADMIN — ATORVASTATIN CALCIUM 40 MG: 40 TABLET, FILM COATED ORAL at 21:12

## 2018-01-18 RX ADMIN — METRONIDAZOLE 500 MG: 500 TABLET ORAL at 04:29

## 2018-01-18 RX ADMIN — SODIUM CHLORIDE 1000 ML: 9 INJECTION, SOLUTION INTRAVENOUS at 11:38

## 2018-01-18 RX ADMIN — AMLODIPINE BESYLATE 5 MG: 5 TABLET ORAL at 08:09

## 2018-01-18 RX ADMIN — METRONIDAZOLE 500 MG: 500 TABLET ORAL at 10:12

## 2018-01-18 RX ADMIN — ASPIRIN 81 MG CHEWABLE TABLET 81 MG: 81 TABLET CHEWABLE at 08:09

## 2018-01-18 RX ADMIN — HYDRALAZINE HYDROCHLORIDE 25 MG: 25 TABLET ORAL at 19:06

## 2018-01-18 RX ADMIN — Medication: at 11:38

## 2018-01-18 RX ADMIN — SENNOSIDES AND DOCUSATE SODIUM 1 TABLET: 8.6; 5 TABLET ORAL at 08:11

## 2018-01-18 RX ADMIN — HYDRALAZINE HYDROCHLORIDE 25 MG: 25 TABLET ORAL at 08:12

## 2018-01-18 RX ADMIN — TRAMADOL HYDROCHLORIDE 50 MG: 50 TABLET, COATED ORAL at 01:19

## 2018-01-18 RX ADMIN — DOCUSATE SODIUM 100 MG: 100 CAPSULE, LIQUID FILLED ORAL at 08:20

## 2018-01-18 RX ADMIN — SENNOSIDES AND DOCUSATE SODIUM 1 TABLET: 8.6; 5 TABLET ORAL at 19:06

## 2018-01-18 RX ADMIN — TORSEMIDE 100 MG: 100 TABLET ORAL at 08:09

## 2018-01-18 RX ADMIN — CALCITRIOL 0.25 MCG: 0.25 CAPSULE, LIQUID FILLED ORAL at 08:11

## 2018-01-18 ASSESSMENT — PAIN DESCRIPTION - DESCRIPTORS
DESCRIPTORS: ACHING
DESCRIPTORS: ACHING;SHARP

## 2018-01-18 NOTE — PROGRESS NOTES
HEMODIALYSIS TREATMENT NOTE    Date: 1/17/2018  Time: 9:17 PM    Data:  Post Wt: 84.9 kg (187 lb 2.7 oz)   Weight change: - 4 kg  Ultrafiltration - Post Run Net Total Removed (mL): 4000 mL  Ultrafiltration - Post Run Net Total Gain (mL): 0 mL  Vascular Access Status: Yes, secured and visible  Dialyzer Rinse: Streaked, Light  Total Blood Volume Processed: 70.1 Liters  Total Dialysis (Treatment) Time: 3.5 hrs       Interventions/Assessment:  Stable 4 hr HD tx via right CVC. Pt had no complaints or issues during treatment. 4.0 kg removed and CVC saline locked. Standing weight 84.9 kg post treatment. Hand off report given to primary 7C RN.      Plan:    Next HD tx per renal team.

## 2018-01-18 NOTE — PLAN OF CARE
Problem: Patient Care Overview  Goal: Plan of Care/Patient Progress Review  Outcome: Improving  Patient went to dialysis mid morning, returned at appx 14:30. White count continues to trend down. Patient tolerating regular diet, voiding small amounts, patient reports he had 1 BM today. Continues in droplet isolation.  BS report ok with patient.

## 2018-01-18 NOTE — PROGRESS NOTES
Nephrology Progress Note  01/17/2018         Assessment & Recommendations:   Murray Nicholson is a 63 year old male with a PMH of ESRD sec to likely DMII and HTN , CAD , HFrEF 20-25%, HLD, MGUS, SHEELA , AAA, Gout on prednisone and allopurinol, GERD, admitted with peritonitis diagnosed 12/29 outpatient and was treated with ceftazidime 1500mg intraperitoneal, presents with cloudy PD fluid , fatigue and abdominal pain.  Nephrology following for ESRD mangement and peritoneal infection.    1. Peritonitis with polymicrobial infection  Cultures have now been positive for pseudomonas, ecoli, strep, candida and Bacteroides implying intrabd source although abd CT and surgery assessments have been negative.   Grossly cloudy PD fluid on admission cell counts >4000 ->decreased to 457->109->874->2527->268 although last sample may have been diluted. Antibiotic coverage now broadened to ceftazidine 1.5 g ip qd, vanco ip to maintain trough >20 (received dose 1/12), gentamicin started 1/12 level 5.2 yesterday, and now metronidazole 500 mg po qid and micafungin 100 mg q24h.   Based on the data he has failed treatment (twice) and it is time to remove PD catheter and transition to hemodialysis for a few weeks.  PD catheter will be removed today, tunnel catheter will be placed for initiation of hemodialysis.  Will order HD tomorrow a.m. we will obtain consent for.  Antibiotic was changed to metronidazole ceftazidime and micafungin.  Follow-up ID recommendation.  Follow-up culture and sensitivity.       2. ESRD on PD since 06/2017 started on iHD 1/16/2018  [facility] under Dr Mcpherson's care  PD prescription: 8hrs , 4 cycles , FV 2000, 2hr dwell , dextrose variable  UF on average 300-900  EDW 86Kg (190lbs)  Kt/V 1.5 PTA  Net UF about 700 ml  PD catheter removed January 15, tunnel catheter placed January 16, IHD started January 16.    Blood pressure and volume: Blood pressure stable, will target a weight of 82 kg as an estimated dry  weight.  We will continue daily dialysis to achieve goals.      Electrolytes  Hypokalemia   Resolved now.      MBD  Phos 3.3, (10/17)  Calcium 8.5, albumin 1.8  On phoslo and Vit D      Anemia  Hb 9.6  On EPO 83477 units Qmonthly  Transfuse if <7  Iron replete per PTA labs       3. Influenza with pneumonia - bilateral infiltrates on chest CT    Recommendations were communicated to primary team in person.      Patient seen and discussed  with Dr. Tena.  Bouchra Saunders  Nephrology Fellow  Pager: [phone]      Interval History :   Patient tolerated dialysis run yesterday achieved goals UF 3 kg, denies any complaint, no major events overnight, still complaining of edema and volume overload.    Review of Systems:   I reviewed the following systems:  GI: Good appetite. - large BM Friday(was constipated)  Neuro:  -confusion  Constitutional:  -fever -chills  CV: -dyspnea +edema.  - chest pain.    Physical Exam:   I/O last 3 completed shifts:  In: 300 [P.O.:250; I.V.:50]  Out: 2925 [Urine:425; Other:2500]   /72  Pulse 92  Temp 98  F (36.7  C) (Oral)  Resp 16  Wt 89.4 kg (197 lb 1.6 oz)  SpO2 100%  BMI 29.11 kg/m2     GENERAL APPEARANCE: NAD  EYES:  No scleral icterus, pupils equal  HENT: mouth without ulcers or lesions  PULM: good ae bilat - bilateral crackles bases  CV: regular rhythm, KELLY present ? gallop     -JVD -  Not seen      -edema bilateral 2+  GI: soft, mildly tender, slightly distended, bowel sounds are present  INTEGUMENT: no cyanosis, no rash  NEURO:  no asterixis      Labs:   All labs reviewed by me  Electrolytes/Renal -   Recent Labs   Lab Test  01/17/18   0900  01/16/18   0435  01/14/18   0749  01/13/18   0956  01/12/18   0742   11/21/17   1239  11/15/17   0758  11/14/17   0645   NA  138  137  135  137  137   < >  142  144  141   POTASSIUM  3.9  4.2  3.5  3.9  3.5   < >  4.6  3.2*  3.7   CHLORIDE  101  97  96  97  98   < >  102  103  103   CO2  28  31  31  32  32   < >  29  30  27    BUN  34*  44*  39*  39*  38*   < >  66*  69*  76*   CR  7.08*  8.69*  8.18*  8.52*  8.27*   < >  7.65*  6.98*  6.85*   GLC  122*  102*  260*  211*  250*   < >  189*  294*  312*   TRINI  8.7  8.3*  8.5  8.6  8.4*   < >  8.9  8.9  8.9   MAG   --    --    --   1.8  1.7   --   1.9  1.9  1.8   PHOS   --    --    --    --   3.3   --    --   5.0*  4.3    < > = values in this interval not displayed.       CBC -   Recent Labs   Lab Test  01/17/18   0900  01/16/18   0435  01/14/18   0749   WBC  13.5*  19.9*  5.6   HGB  9.1*  9.2*  9.6*   PLT  324  340  337       LFTs -   Recent Labs   Lab Test  01/07/18   1655  11/13/17   1709  08/02/17   1311  07/05/17   1204   ALKPHOS  76  104   --   98   BILITOTAL  0.4  0.8   --   0.5   ALT  24  26   --   15   AST  32  22   --   13   PROTTOTAL  6.0*  6.0*   --   6.2*   ALBUMIN  1.8*  2.3*  3.2*  2.7*       Iron Panel -   Recent Labs   Lab Test  07/19/17   1306  07/05/17   1204  06/21/17   1058   IRON  46  26*  69   IRONSAT  18  12*  29   ALBERTINA  369  542*  557*       Current Medications:    gelatin absorbable  1 each Topical During Hemodialysis (from stock)     sodium chloride (PF)  3 mL Intracatheter During Hemodialysis (from stock)     sodium chloride (PF)  3 mL Intracatheter During Hemodialysis (from stock)     heparin Lock (1000 units/mL High concentration)  3 mL Intracatheter Once     heparin Lock (1000 units/mL High concentration)  3 mL Intracatheter Once     cefTAZidime  1 g Intravenous Q24H     micafungin  100 mg Intravenous Q24H     metroNIDAZOLE  500 mg Oral Q6H JEAN     insulin aspart  1-10 Units Subcutaneous TID AC     insulin aspart  1-7 Units Subcutaneous At Bedtime     allopurinol  400 mg Oral Daily     hydrALAZINE  25 mg Oral TID     torsemide  100 mg Oral BID     predniSONE  5 mg Oral Daily     senna-docusate  1 tablet Oral BID     spironolactone  50 mg Oral Daily     amLODIPine  5 mg Oral Daily     aspirin  81 mg Oral Daily     atorvastatin  40 mg Oral Daily     fluticasone   1-2 spray Both Nostrils Daily     calcitRIOL  0.25 mcg Oral Daily     calcium acetate  667 mg Oral TID w/meals     omeprazole  20 mg Oral Daily     losartan  100 mg Oral Daily     metoprolol succinate  100 mg Oral Daily       - MEDICATION INSTRUCTIONS -       Bouchra Saunders MD

## 2018-01-18 NOTE — PROGRESS NOTES
INTERNAL MEDICINE PROGRESS NOTE    Murray Nicholson ([id number]) admitted on 1/7/2018 01/18/2018    Assessment & Plan: Murray Nicholson is a 63 year old male with history of T2DM, MGUS, HLD, HTN, ESRD (likely 2/2 DM,HTN) on PD, HFrEF (EF - 20-25% with severe global hypokinesis), anemia of chronic disease and gout on allopurinol and prednisone who is admitted for further treatment of bacterial peritonitis that has failed outpatient treatment. Patient hemodynamically stable, PD removal 1/15 with tunneled catheter placement 1/16, undergoing HD runs, consolidating antibiotics.      Plan For Today:  -Additional dialysis run  -Fungal culture back in 48 hours      # Bacterial peritonitis s/p treatment failure (9 day intraperitoneal course of Ceftazidime).  # ESRD 2/2 T2Dm, HTN - on PD now s/p catheter removal with tunneled placement 1/16  Patient with abdominal pain, cloudy dialysate and s/p 9 day course of intraperitoneal ceftazidime for suspected peritonitis as outpatient without improvement. Cultures were done at [facility]. Per Nephrology note, peritoneal fluid notable for E.Coli, Pseudomonas and Streptococcus. Now growing candida glabrata and bacteroides caccae on day 4. WBC in peritoneal fluid originally improving, but did increase again, concerning for biofilm on PD catheter. Have discontinued vanc/gent, will have completed ceftazidime 1/19, at that point only micafungin and metronidazole will remain.  - PD catheter removal 1/15  - ID consulted, appreciate recs  - Antimicrobials:    -ceftazidime  Stop date 1/19    -metronidazole    -vancomycin -> d/c 1/16    -micafungin - awaiting sensitivities    -gentamycin -> d/c 1/17  - Tunneled catheter via IR 1/16 - will need 2-3 HD runs prior to discharge to outpatient dialysis - setup at [facility]  - Nephrology following, appreciate recs  - Continue PTA calcitriol, and phoslo      # Influenza  CXR on 1/11 showed right LL opacity.  - Continue flonase  - Tessalon  - Tamiflu  completed      # Hypokalemia  - Replace PRN      # T2DM  Last A1C - 6.4 in 6/2017, but 9.1 on admission. On Glipizide PTA and no insulin. BG has been elevated this admission likely due to acute infection, but based on his A1c his BG hasn't been well controlled in recent months.  - Hold Glipizide due to hypoglycemia risk. Will resume at discharge  - High dose SSI  - Glucose checks  - Hypoglycemia protocol      #HFrEF (EF 20-25% on 4/2017 TTE) - Stage C, NYHA class III  #CAD  #HTN  #HLD  #Ascending aortic aneurysm  No cardiorespiratory symptoms on presentation. On Losartan, Toprol and ASA. Last Echo 4/2017 with  coronary angiogram yet to be done, although ordered. Ascending aortic aneurysm 4.8cm as at last check 8/2/2017.   - Continue PTA ASA 81mg daily  - Continue PTA Losartan 100mg daily  - Continue  PTA Toprol XL 100mg daily - held 1/15 due to soft BP  - Continue PTA Torsemide 100mg BID (was on bumex but currently on torsemide which is a recent change)  - Continue PTA Atorvastatin 40mg daily  - Continue PTA Amlodipine 5 mg daily  - [facility] clinic referral on d/c  - Continue  PTA spironolactone 50mg daily   - Restart PTA Hydralazine at a lower dose 25mg TID (50mg TID at home)  - Hold Imdur      CHRONIC PROBLEMS  # Gout. Stable. Continue Allopurinol, prednisone (patient reports he gets frequent flares)  # MGUS. Stable.  # Anemia of chronic disease: Stable    # GERD. Stable. On Omeprazole       Diet: normal  Fluids: none  DVT Prophylaxis: Pneumatic Compression Devices, ambulate   Code Status: Full Code      Disposition Plan: Expected discharge after completed necessary HD runs.      Patient discussed with attending provider Dr. Aguaoy.      Markell Gonzalez  Internal Medicine PGY2  ==================================================================  Subjective:  No acute events overnight, some slight pain at prior PD catheter site, denies fevers, chills, nausea, vomiting, appetite is improving. Getting third dialysis run  today, tolerating these well.     ROS: negative other than listed above.    Objective:  Most recent vital signs:  /74  Pulse 92  Temp 98  F (36.7  C)  Resp 16  Wt 82.7 kg (182 lb 4.8 oz)  SpO2 98%  BMI 26.92 kg/m2  Temp:  [97.6  F (36.4  C)-98.3  F (36.8  C)] 98  F (36.7  C)  Heart Rate:  [] 98  Resp:  [16] 16  BP: (107-129)/(65-85) 115/74  SpO2:  [94 %-100 %] 98 %  Wt Readings from Last 2 Encounters:   01/18/18 82.7 kg (182 lb 4.8 oz)   12/15/17 90.3 kg (199 lb)     Intake/Output Summary (Last 24 hours) at 01/18/18 1501  Last data filed at 01/18/18 1348   Gross per 24 hour   Intake              350 ml   Output             7225 ml   Net            -6875 ml     Physical exam:  General: Patient lying comfortably in bed, NAD  HEENT: EOMI, no scleral icterus or injection, no JVD, MMM  Cardiac: tachycardic, regular, no m/r/g appreciated.   Respiratory: CTAB, no wheezes, rhonchi or crackles appreciated.  GI: NABS, NT/ND, no guarding or rebound  Extremities: trace LE edema - stable, pulses DP 2+, radial pulses 2+   Skin: No acute lesions appreciated  Neuro: AOx3, CN II-XII grossly intact, fluent speech, normal gait    Results for SANCHEZ MCNEIL (MRN [id number]) as of 1/18/2018 14:43   Ref. Range 1/18/2018 07:46   Sodium Latest Ref Range: 133 - 144 mmol/L 138   Potassium Latest Ref Range: 3.4 - 5.3 mmol/L 3.7   Chloride Latest Ref Range: 94 - 109 mmol/L 105   Carbon Dioxide Latest Ref Range: 20 - 32 mmol/L 26   Urea Nitrogen Latest Ref Range: 7 - 30 mg/dL 22   Creatinine Latest Ref Range: 0.66 - 1.25 mg/dL 4.76 (H)   GFR Estimate Latest Ref Range: >60 mL/min/1.7m2 12 (L)   GFR Estimate If Black Latest Ref Range: >60 mL/min/1.7m2 15 (L)   Calcium Latest Ref Range: 8.5 - 10.1 mg/dL 8.0 (L)   Anion Gap Latest Ref Range: 3 - 14 mmol/L 7   Glucose Latest Ref Range: 70 - 99 mg/dL 128 (H)   WBC Latest Ref Range: 4.0 - 11.0 10e9/L 11.2 (H)   Hemoglobin Latest Ref Range: 13.3 - 17.7 g/dL 9.1 (L)   Hematocrit Latest  Ref Range: 40.0 - 53.0 % 29.4 (L)   Platelet Count Latest Ref Range: 150 - 450 10e9/L 295   RBC Count Latest Ref Range: 4.4 - 5.9 10e12/L 2.97 (L)   MCV Latest Ref Range: 78 - 100 fl 99   MCH Latest Ref Range: 26.5 - 33.0 pg 30.6   MCHC Latest Ref Range: 31.5 - 36.5 g/dL 31.0 (L)   RDW Latest Ref Range: 10.0 - 15.0 % 17.3 (H)

## 2018-01-18 NOTE — PROGRESS NOTES
HEMODIALYSIS TREATMENT NOTE    Date: 1/18/2018  Time: 3:17 PM    Data:  Pre Wt: 85.1 kg (187 lb 9.8 oz)   Desired Wt: 82.1 kg   Post Wt: 82.7 kg (182 lb 5.1 oz)  Weight gain: -2.4 kg   Weight change: 2.4 kg  Ultrafiltration - Post Run Net Total Removed (mL): 3000 mL  Ultrafiltration - Post Run Net Total Gain (mL): 0 mL  Vascular Access Status: Yes, secured and visible  Dialyzer Rinse: Streaked  Total Blood Volume Processed: 24.3  Total Dialysis (Treatment) Time:  2 hours    Lab:   No    Interventions/Assessment:  Stable run for 2 hours with 3kg fluid removed. BFR lowered due to frequent arterial alarms. CVC lines locked with saline. Hand off report given to floor nurse.     Plan:    Next HD per renal team.

## 2018-01-18 NOTE — PROGRESS NOTES
Nephrology Progress Note  01/18/2018         Assessment & Recommendations:   Murray Nicholson is a 63 year old male with a PMH of ESRD sec to likely DMII and HTN , CAD , HFrEF 20-25%, HLD, MGUS, SHEELA , AAA, Gout on prednisone and allopurinol, GERD, admitted with peritonitis diagnosed 12/29 outpatient and was treated with ceftazidime 1500mg intraperitoneal, presents with cloudy PD fluid , fatigue and abdominal pain.  Nephrology following for ESRD mangement and peritoneal infection.    1. Peritonitis with polymicrobial infection  Cultures have now been positive for pseudomonas, ecoli, strep, candida and Bacteroides implying intrabd source although abd CT and surgery assessments have been negative.   Grossly cloudy PD fluid on admission cell counts >4000 ->decreased to 457->109->874->2527->268 although last sample may have been diluted. Antibiotic coverage now broadened to ceftazidine 1.5 g ip qd, vanco ip to maintain trough >20 (received dose 1/12), gentamicin started 1/12 level 5.2 yesterday, and now metronidazole 500 mg po qid and micafungin 100 mg q24h.   Based on the data he has failed treatment (twice) and it is time to remove PD catheter and transition to hemodialysis for a few weeks.  PD catheter will be removed today, tunnel catheter will be placed for initiation of hemodialysis.  Will order HD tomorrow a.m. we will obtain consent for.  Antibiotic was changed to metronidazole ceftazidime and micafungin.  Follow-up ID recommendation.  Follow-up culture and sensitivity.       2. ESRD on PD since 06/2017 started on iHD 1/16/2018  [facility] under Dr Mcpherson's care  PD prescription: 8hrs , 4 cycles , FV 2000, 2hr dwell , dextrose variable  UF on average 300-900  EDW 86Kg (190lbs)  Kt/V 1.5 PTA  Net UF about 700 ml  PD catheter removed January 15, tunnel catheter placed January 16, IHD started January 16.    Blood pressure and volume: Blood pressure stable above target goal, will target a weight of 82 kg as an  estimated dry weight.  We will continue daily dialysis to achieve goals. He is now on amlodipine 5mg, losartan 100mg, metoprolol , spironolactone, torsemide , hydralazine.  Goal /80 plan to DC amlodipine. And if needed will decrease the other agent. Another run for UF only today.        Electrolytes  Hypokalemia   Resolved now.      MBD  Phos 3.3, (10/17)  Calcium 8.5, albumin 1.8  On phoslo and Vit D      Anemia  Hb 9.6  On EPO 70987 units Qmonthly  Transfuse if <7  Iron replete per PTA labs       3. Influenza with pneumonia - bilateral infiltrates on chest CT    Recommendations were communicated to primary team in person.      Patient seen and discussed  with Dr. Tena.  Bouchra Saunders  Nephrology Fellow  Pager: [phone]      Interval History :   Awake and alert not in distress feels fine, denies any fever no NV no new compliant, no major events overnight, tolerate dialysis yesterday no issue, weight is now down to 84kg.   Review of Systems:   I reviewed the following systems:  GI: Good appetite. - large BM Friday(was constipated)  Neuro:  -confusion  Constitutional:  -fever -chills  CV: -dyspnea +edema.  - chest pain.    Physical Exam:   I/O last 3 completed shifts:  In: 350 [P.O.:350]  Out: 4550 [Urine:550; Other:4000]   /65  Pulse 92  Temp 97.6  F (36.4  C) (Oral)  Resp 16  Wt 85.1 kg (187 lb 11.2 oz)  SpO2 97%  BMI 27.72 kg/m2     GENERAL APPEARANCE: NAD  EYES:  No scleral icterus, pupils equal  HENT: mouth without ulcers or lesions  PULM: good ae bilat - bilateral crackles bases  CV: regular rhythm, KELLY present ? gallop     -JVD -  Not seen      -edema bilateral 2+  GI: soft, mildly tender, slightly distended, bowel sounds are present  INTEGUMENT: no cyanosis, no rash  NEURO:  no asterixis      Labs:   All labs reviewed by me  Electrolytes/Renal -   Recent Labs   Lab Test  01/18/18   0746  01/17/18   0900  01/16/18   0435   01/13/18   0956  01/12/18   0742   11/21/17   1239   11/15/17   0758  11/14/17   0645   NA  138  138  137   < >  137  137   < >  142  144  141   POTASSIUM  3.7  3.9  4.2   < >  3.9  3.5   < >  4.6  3.2*  3.7   CHLORIDE  105  101  97   < >  97  98   < >  102  103  103   CO2  26  28  31   < >  32  32   < >  29  30  27   BUN  22  34*  44*   < >  39*  38*   < >  66*  69*  76*   CR  4.76*  7.08*  8.69*   < >  8.52*  8.27*   < >  7.65*  6.98*  6.85*   GLC  128*  122*  102*   < >  211*  250*   < >  189*  294*  312*   TRINI  8.0*  8.7  8.3*   < >  8.6  8.4*   < >  8.9  8.9  8.9   MAG   --    --    --    --   1.8  1.7   --   1.9  1.9  1.8   PHOS   --    --    --    --    --   3.3   --    --   5.0*  4.3    < > = values in this interval not displayed.       CBC -   Recent Labs   Lab Test  01/18/18   0746  01/17/18   0900  01/16/18   0435   WBC  11.2*  13.5*  19.9*   HGB  9.1*  9.1*  9.2*   PLT  295  324  340       LFTs -   Recent Labs   Lab Test  01/07/18   1655  11/13/17   1709  08/02/17   1311  07/05/17   1204   ALKPHOS  76  104   --   98   BILITOTAL  0.4  0.8   --   0.5   ALT  24  26   --   15   AST  32  22   --   13   PROTTOTAL  6.0*  6.0*   --   6.2*   ALBUMIN  1.8*  2.3*  3.2*  2.7*       Iron Panel -   Recent Labs   Lab Test  07/19/17   1306  07/05/17   1204  06/21/17   1058   IRON  46  26*  69   IRONSAT  18  12*  29   ALBERTINA  369  542*  557*       Current Medications:    sodium chloride 0.9%  250 mL Intravenous Once in dialysis     sodium chloride 0.9%  1,000-2,000 mL Hemodialysis Machine Once     gelatin absorbable  1 each Topical During Hemodialysis (from stock)     - MEDICATION INSTRUCTIONS -   Does not apply Once     sodium chloride (PF)  3 mL Intracatheter During Hemodialysis (from stock)     sodium chloride (PF)  3 mL Intracatheter During Hemodialysis (from stock)     heparin Lock (1000 units/mL High concentration)  3 mL Intracatheter Once     heparin Lock (1000 units/mL High concentration)  3 mL Intracatheter Once     cefTAZidime  1 g Intravenous Q24H     micafungin   100 mg Intravenous Q24H     metroNIDAZOLE  500 mg Oral Q6H JEAN     insulin aspart  1-10 Units Subcutaneous TID AC     insulin aspart  1-7 Units Subcutaneous At Bedtime     allopurinol  400 mg Oral Daily     hydrALAZINE  25 mg Oral TID     torsemide  100 mg Oral BID     predniSONE  5 mg Oral Daily     senna-docusate  1 tablet Oral BID     spironolactone  50 mg Oral Daily     amLODIPine  5 mg Oral Daily     aspirin  81 mg Oral Daily     atorvastatin  40 mg Oral Daily     fluticasone  1-2 spray Both Nostrils Daily     calcitRIOL  0.25 mcg Oral Daily     calcium acetate  667 mg Oral TID w/meals     omeprazole  20 mg Oral Daily     losartan  100 mg Oral Daily     metoprolol succinate  100 mg Oral Daily       - MEDICATION INSTRUCTIONS -       Bouchra Saunders MD

## 2018-01-18 NOTE — PLAN OF CARE
Problem: Patient Care Overview  Goal: Plan of Care/Patient Progress Review  Outcome: Improving  Droplet isolation. Assumed care of patient 2130 when returned from dialysis. AVSS on RA. A+OX4. Pain controlled with prn tylenol. Regular diet. CVC for HD c/d/i. Old PD site c/d/i. No void or BM. UAL, steady on feet. PO and IV ABX continue.   Plan: needs three runs of HD before DC to home. Patient OK with bedside report but does not want to be woken for it.

## 2018-01-18 NOTE — PROGRESS NOTES
[facility] GENERAL ID SERVICE PROGRESS NOTE -- Sign Off   Murray Nicholson : 1955  Medical record number [id number]  Date of Service: 2018  RECOMMENDATIONS:   - Complete a three week course of IP/IV ceftazidime after tomorrow's (18) dose, to finish treatment for the pre-admission Gram negative and Streptococcus peritonitis isolates.  - Decrease the PO metronidazole dosing to 500 mg PO TID (does not need QID) to complete a two week course ( - ) for the 18 Bacteroides peritoniteal fluid isolate.  - Continue micafungin to complete a two week course ( - ) -- or at least until the antifungal susceptibilities on the 18 or 18 C glabrata isolates are reported, at which point it might be possible to switch to oral fluconazole at 400 mg PO daily (if the isolate is susceptible).  It is more likely, however, that he will need the IV micafungin for the whole course.  - The 1/15/18 peritoneal fluid anaerobic broth culture Staph epifrmifid isolate was very likely a contaminant and does not need to be treated.  - With the above plan for the antimicrobials and assuming he continues to feel well, he should not have any reason to need to be seen in Infectious Disease Clinic post-discharge.    Case discussed with the [place] [id number] Medicine team today.  With the above plan, General ID will sign off now.  Thanks for allowing us to participate in the care of this gentleman.  Please page if additional ID questions or concerns arise.  Vinayak Lawrence MD  Pager [phone]    PROBLEM LIST:   1. Peritonitis, multi-organism  2. Likely colonized peritoneal dialysis access catheter -- removed 1/15/18  3. Influenza A via PCR, completed an oseltamivir course  4. ESRD, previously undergoing PD  5. DM II  6. Bicuspid Aortic Valve with accompanying ascending aortic aneurysm  7. GERD, on PPI  8. Anemia of chronic disease  9. Gout  10. MGUS  11. CHF, EF ~20%  DISCUSSION:   A 63 year old type 2 diabetic  gentleman with end-stage renal disease presumed due to diabetic nephropathy and hypertension as well as a history of MGUS, HFrEF (LVEF - 20-25% with severe global hypokinesis), anemia of chronic disease and gout (on allopurinol and prednisone), and hyperlipidemia who has been on chronic peritonial dialysis.  He was admitted to the [facility] Medicine service on 1/7/18 for further treatment of bacterial peritonitis that had failed outpatient treatment. A pre-admission peritoneal fluid culture from [facility] on 1/2/18 grew pan-sensitive E coli and Pseudomonas aeruginosa as well as a Streptococcus species. Pre-admission, he had been treating himself with intraperitoneal ceftazidime since 12/29/17, and this should have covered the organisms that he grew fairly well, but was thought perhaps to have not fully covered the Streptococcal species, such that he appeared to improve with the addition of empiric IV vancomycin on 1/7/18.  His 1/8/18 and 1/12/18 CTs were largely unremarkable for any cause of infection beyond peritonitis.  He defervesced on 1/8/18 and has remained afebrile, but then had a new leukocytosis on 1/16/18.  His peritoneal fluid WBC count markedly improved by 1/11/18 but then worsened again with a 1/15/18 peritoneal fluid WBC of 4,256 (predominately neutrophilic).  Peritoneal fluid cultures from 1/7/19, 1/13/18, and 1/15/18 grew C glabrata (antifungal susceptibilities pending) and Bactroides caccae (on 1/7/18 only).  With the persistent peritoneal inflammation and peripheral leukocytosis, his peritoneal dialysis catheter was presumed to be irredeemably, polymicrobially contaminated (with likely biofilm formation), so the peritoneal catheter was removed on 1/15/18.  A right IJ hemodialysis catheter was placed on 1/16/18 and he is expecting to discharge on intermittent hemodialysis.  He is completing a three week course (12/29/17 - 1/19/18) of IP / IV ceftazidime therapy to treat the 1/27/17 Gram negative  isolates and the simultaneous Streptococcus and is now also receiving two week-long courses of oral metronidazole (started 1/14/18, to treat the Bacteroides) and IV micafungin (since 1/13/18, to treat the C glabrata) -- both planned through 1/30/18.  A 1/15/18 peritoneal dialysate fluid anaerobic culture also belatedly grew Staph epidermidis (in broth only) that is presumed to have been a contaminate and he has improved even though that has not been treated.  He seems to tolerate all these antimicrobials well and is feeling close to back to his baseline.  He is anxious to go home.     INTERVAL HISTORY:  Mr. Nicholson remains consistently afebrile (T max 98.4 degrees since 1/9/18 PM) without recent chills or sweats.  His 1/16/18 leukocytosis spike of 19.9 has decreased to 11.2 this morning on ongoing intermittent ceftazidime (since 1/7/18 and also pre-admission since 12/29/17, now given on hemodialysis), metronidazole (since 1/14/18), and micafungin (since 1/13/18).  He also completed a oseltamivir course (1/12 - 15/18).  His peritoneal dialysis catheter was removed on 1/15/18 and a new right IJ tunneled hemodialysis catheter was placed on 1/16/18.  He plans to discharge on intermittent hemodialysis at [facility] -- he is tolerating hemodialysis well.  His prior abdominal pain has mostly resolved, although the old PD catheter site is still a bit achy and tender.  His cough is slowly improving -- it is still more troublesome at night.  He is feeling overall better, close to baseline now, with more appetite and energy over the past two days and with without chest pain, dyspnea, nausea, emesis, abdominal pain elsewhere, diarrhea, rash, headache, or other new recent complaints.  He remains ambulatory.  The 1/7/19, 1/13/18, and 1/15/18 peritoneal fluid cultures (from before the PD catheter was removed) grew C glabrata (antifungal susceptibilities are still pending) and Bactroides caccae (on 1/7/18 only).  The 1/15/18 peritoneal  dialysate fluid anaerobic culture also grew Staph epi (in broth only which is presumed to have been a contaminate and has not been treated.  ROS:  As per Interval History.  A complete ROS is otherwise negative.    Allergies   Allergen Reactions     Norco [Hydrocodone-Acetaminophen] Nausea and Vomiting     Cats      Throat tightness     Penicillins Hives     Seasonal Allergies      rhinitis     Shrimp      Throat closes        heparin Lock (1000 units/mL High concentration)  3 mL Intracatheter Once     heparin Lock (1000 units/mL High concentration)  3 mL Intracatheter Once     cefTAZidime  1 g Intravenous Q24H     micafungin  100 mg Intravenous Q24H     metroNIDAZOLE  500 mg Oral Q6H JEAN     insulin aspart  1-10 Units Subcutaneous TID AC     insulin aspart  1-7 Units Subcutaneous At Bedtime     allopurinol  400 mg Oral Daily     hydrALAZINE  25 mg Oral TID     torsemide  100 mg Oral BID     predniSONE  5 mg Oral Daily     senna-docusate  1 tablet Oral BID     spironolactone  50 mg Oral Daily     amLODIPine  5 mg Oral Daily     aspirin  81 mg Oral Daily     atorvastatin  40 mg Oral Daily     fluticasone  1-2 spray Both Nostrils Daily     calcitRIOL  0.25 mcg Oral Daily     calcium acetate  667 mg Oral TID w/meals     omeprazole  20 mg Oral Daily     losartan  100 mg Oral Daily     metoprolol succinate  100 mg Oral Daily   ALLERGIES:  Allergies   Allergen Reactions     Norco [Hydrocodone-Acetaminophen] Nausea and Vomiting     Cats      Throat tightness     Penicillins Hives     Seasonal Allergies      rhinitis     Shrimp      Throat closes    PHYSICAL EXAMINATION:   Vital signs are stable.  Vital sign ranges over the past 24 hours:  Temp:  [97.6  F (36.4  C)-98.3  F (36.8  C)] 98  F (36.7  C)  Heart Rate:  [] 98  Resp:  [16] 16  BP: (107-123)/(65-80) 115/74  SpO2:  [94 %-100 %] 98 %   Intake/Output Summary (Last 24 hours) at 01/18/18 1602  Last data filed at 01/18/18 1600   Gross per 24 hour   Intake               590 ml   Output             7100 ml   Net            -6510 ml     GENERAL:  A pleasant, WDWN, 62 yo man in no disomfort.   HEAD:  NCAT.  EYES:  EOMI, anicteric sclerae.  ENT:  No otorrhea.  No anterior oral lesion.  NECK:  Supple.  LYMPH:  No lymphadenopathy.  LUNGS:  Bibasilar crackles, otherwise clear breath sounds, no wheezing  CARDIOVASCULAR:  Regular rate and rhythm no c/g/r/m  ABDOMEN:  Normal bowel sounds, soft, nontender to palpation. No appreciable hepatosplenomegaly  SKIN:  No acute rash.  Right IJ hemodialysis catheter site lacks inflammation.  EXTREMITIES:  Distally warm.  NEUROLOGIC:  Alert, oriented, moves extremities x 4.  LABORATORY RESULTS:   Culture Micro   Date Value Ref Range Status   01/15/2018 (A)  Preliminary    On day 2, isolated in broth only:  Staphylococcus epidermidis  Susceptibility testing in progress     01/15/2018 not isolated or reported on routine culture  Preliminary   01/15/2018 Culture in progress  Preliminary   01/15/2018 (A)  Final    On day 2, isolated in broth only:  Candida glabrata     01/15/2018   Final    Critical Value/Significant Value, preliminary result only, called to and read back by  Grecia Todd RN at 1049 on 1.17.18 [facility].     01/15/2018 Culture negative after 3 days  Preliminary     Recent Labs   Lab Test  07/05/17   1204  05/31/17   1111  05/17/17   1214  04/13/17   0651  04/12/17   0935  04/11/17   1319  01/13/16   1337  08/10/15   0729   SED   --    --    --    --    --    --   80*   --    CRP  35.4*  15.9*  39.3*  270.0*  200.0*  130.0*   --    --    PSA   --    --    --    --    --    --    --   3.34     Recent Labs   Lab Test  01/18/18   0746  01/17/18   0900  01/16/18   0435  01/14/18   0749  01/13/18   0956  01/12/18   0742   01/10/18   1015   01/08/18   0815  01/07/18   1655   11/13/17   1709   07/05/17   1204  06/26/17   0140   WBC  11.2*  13.5*  19.9*  5.6  5.4  4.1   < >  3.3*   < >  4.4  6.3   < >  9.6   < >  11.9*  8.3   ANEU   --    --    --    --     --    --    --   1.9   --   3.3  5.3   --   8.4*   --   10.9*  7.6   AEOS   --    --    --    --    --    --    --   0.3   --   0.2  0.1   --   0.1   --   0.0  0.1   HCT  29.4*  29.8*  29.6*  31.0*  31.9*  32.0*   < >  31.3*   < >  28.9*  31.4*   < >  28.9*   < >  27.4*  30.2*   PLT  295  324  340  337  344  323   < >  250   < >  256  277   < >  277   < >  267  203    < > = values in this interval not displayed.     Recent Labs   Lab Test  18   0746  18   0900  18   0435   NA  138  138  137   BUN  22  34*  44*   CO2  26  28  31   CR  4.76*  7.08*  8.69*   GFRESTIMATED  12*  8*  6*     Recent Labs   Lab Test  18   1655  17   1709  17   1311  17   1204   BILITOTAL  0.4  0.8   --   0.5   ALKPHOS  76  104   --   98   ALBUMIN  1.8*  2.3*  3.2*  2.7*   AST  32  22   --   13   ALT  24  26   --   15     Recent Labs   Lab Test  16   1337  05/19/15   1000   IGG   --   1380   IGM   --   108   IGA   --   203   SED  80*   --      Ascitic Fluid  : WBC 4733 (80% PMN) -> yeast and GNR in broth only  :  (57% PMN)  :  (90% PMN)    (56% PMN) and 2.527 (88% PMN)  1/15 WBC 4,256 (91% PMN)    Imagin/16 Tunneled hemodialysis catheter placed by IR.   Chest / abdm CT:  1. Patchy consolidative and groundglass nodularity scattered  throughout the bilateral lungs. Findings are most suspicious for  infection or inflammation, and etiology could include typical or  atypical organisms.  2. Pneumoperitoneum and small to moderate volume homogenous  low-density peritoneal fluid are compatible with changes related to  the peritoneal dialysis.   Abdm CT:  1. Moderate intraperitoneal free air collecting in the upper abdomen,  likely be secondary to intraperitoneal administration of antibiotics  in this patient with a peritoneal dialysis catheter, though, given  this patient's history of peritonitis, a gas-forming organism may be  contributing. Perforated  viscous could have a similar appearance in  the appropriate context.  Small amount of fluid adjacent to the  peritoneal dialysis catheter tip in the pelvis, within expected  limits.  2. Mildly dilated appendix without significant periappendiceal fat  stranding. Recommend clinical correlation for acute appendicitis.  3. Colonic diverticulosis without evidence of diverticulitis.  4. Mild bibasilar groundglass opacities may represent atelectasis  given associated peripheral/subpleural linear atelectasis versus  scarring, though infection remains a possibility.  5. Marked cardiomegaly.

## 2018-01-19 LAB
ANION GAP SERPL CALCULATED.3IONS-SCNC: 7 MMOL/L (ref 3–14)
BUN SERPL-MCNC: 31 MG/DL (ref 7–30)
CALCIUM SERPL-MCNC: 8.7 MG/DL (ref 8.5–10.1)
CHLORIDE SERPL-SCNC: 104 MMOL/L (ref 94–109)
CO2 SERPL-SCNC: 26 MMOL/L (ref 20–32)
CREAT SERPL-MCNC: 5.44 MG/DL (ref 0.66–1.25)
ERYTHROCYTE [DISTWIDTH] IN BLOOD BY AUTOMATED COUNT: 17.4 % (ref 10–15)
GFR SERPL CREATININE-BSD FRML MDRD: 11 ML/MIN/1.7M2
GLUCOSE BLDC GLUCOMTR-MCNC: 163 MG/DL (ref 70–99)
GLUCOSE BLDC GLUCOMTR-MCNC: 189 MG/DL (ref 70–99)
GLUCOSE BLDC GLUCOMTR-MCNC: 200 MG/DL (ref 70–99)
GLUCOSE BLDC GLUCOMTR-MCNC: 257 MG/DL (ref 70–99)
GLUCOSE SERPL-MCNC: 177 MG/DL (ref 70–99)
HCT VFR BLD AUTO: 30.6 % (ref 40–53)
HGB BLD-MCNC: 9.6 G/DL (ref 13.3–17.7)
LACTATE BLD-SCNC: 0.8 MMOL/L (ref 0.7–2)
MCH RBC QN AUTO: 31.2 PG (ref 26.5–33)
MCHC RBC AUTO-ENTMCNC: 31.4 G/DL (ref 31.5–36.5)
MCV RBC AUTO: 99 FL (ref 78–100)
PLATELET # BLD AUTO: 281 10E9/L (ref 150–450)
POTASSIUM SERPL-SCNC: 4 MMOL/L (ref 3.4–5.3)
RBC # BLD AUTO: 3.08 10E12/L (ref 4.4–5.9)
SODIUM SERPL-SCNC: 137 MMOL/L (ref 133–144)
WBC # BLD AUTO: 15 10E9/L (ref 4–11)

## 2018-01-19 PROCEDURE — 25000132 ZZH RX MED GY IP 250 OP 250 PS 637: Performed by: STUDENT IN AN ORGANIZED HEALTH CARE EDUCATION/TRAINING PROGRAM

## 2018-01-19 PROCEDURE — 36415 COLL VENOUS BLD VENIPUNCTURE: CPT | Performed by: STUDENT IN AN ORGANIZED HEALTH CARE EDUCATION/TRAINING PROGRAM

## 2018-01-19 PROCEDURE — 27210995 ZZH RX 272: Performed by: STUDENT IN AN ORGANIZED HEALTH CARE EDUCATION/TRAINING PROGRAM

## 2018-01-19 PROCEDURE — 83605 ASSAY OF LACTIC ACID: CPT | Performed by: NURSE PRACTITIONER

## 2018-01-19 PROCEDURE — 25000128 H RX IP 250 OP 636: Performed by: PEDIATRICS

## 2018-01-19 PROCEDURE — 25000132 ZZH RX MED GY IP 250 OP 250 PS 637: Performed by: INTERNAL MEDICINE

## 2018-01-19 PROCEDURE — 25000131 ZZH RX MED GY IP 250 OP 636 PS 637: Performed by: INTERNAL MEDICINE

## 2018-01-19 PROCEDURE — 99233 SBSQ HOSP IP/OBS HIGH 50: CPT | Mod: GC | Performed by: PEDIATRICS

## 2018-01-19 PROCEDURE — 00000146 ZZHCL STATISTIC GLUCOSE BY METER IP

## 2018-01-19 PROCEDURE — 25000128 H RX IP 250 OP 636: Performed by: STUDENT IN AN ORGANIZED HEALTH CARE EDUCATION/TRAINING PROGRAM

## 2018-01-19 PROCEDURE — 25000132 ZZH RX MED GY IP 250 OP 250 PS 637: Performed by: HOSPITALIST

## 2018-01-19 PROCEDURE — 80048 BASIC METABOLIC PNL TOTAL CA: CPT | Performed by: STUDENT IN AN ORGANIZED HEALTH CARE EDUCATION/TRAINING PROGRAM

## 2018-01-19 PROCEDURE — 12000001 ZZH R&B MED SURG/OB UMMC

## 2018-01-19 PROCEDURE — 85027 COMPLETE CBC AUTOMATED: CPT | Performed by: STUDENT IN AN ORGANIZED HEALTH CARE EDUCATION/TRAINING PROGRAM

## 2018-01-19 PROCEDURE — 36415 COLL VENOUS BLD VENIPUNCTURE: CPT | Performed by: NURSE PRACTITIONER

## 2018-01-19 RX ADMIN — ATORVASTATIN CALCIUM 40 MG: 40 TABLET, FILM COATED ORAL at 20:20

## 2018-01-19 RX ADMIN — FLUTICASONE PROPIONATE 2 SPRAY: 50 SPRAY, METERED NASAL at 08:09

## 2018-01-19 RX ADMIN — OMEPRAZOLE 20 MG: 20 CAPSULE, DELAYED RELEASE ORAL at 08:04

## 2018-01-19 RX ADMIN — CALCITRIOL 0.25 MCG: 0.25 CAPSULE, LIQUID FILLED ORAL at 08:03

## 2018-01-19 RX ADMIN — HYDRALAZINE HYDROCHLORIDE 25 MG: 25 TABLET ORAL at 08:05

## 2018-01-19 RX ADMIN — CALCIUM ACETATE 667 MG: 667 CAPSULE ORAL at 13:01

## 2018-01-19 RX ADMIN — PREDNISONE 5 MG: 2.5 TABLET ORAL at 08:04

## 2018-01-19 RX ADMIN — CALCIUM ACETATE 667 MG: 667 CAPSULE ORAL at 20:21

## 2018-01-19 RX ADMIN — CEFTAZIDIME 1 G: 2 INJECTION, POWDER, FOR SOLUTION INTRAVENOUS at 21:59

## 2018-01-19 RX ADMIN — VANCOMYCIN HYDROCHLORIDE 1750 MG: 10 INJECTION, POWDER, LYOPHILIZED, FOR SOLUTION INTRAVENOUS at 13:01

## 2018-01-19 RX ADMIN — ACETAMINOPHEN 650 MG: 325 TABLET ORAL at 09:55

## 2018-01-19 RX ADMIN — METRONIDAZOLE 500 MG: 500 TABLET ORAL at 03:55

## 2018-01-19 RX ADMIN — ALBUTEROL SULFATE 2 PUFF: 90 AEROSOL, METERED RESPIRATORY (INHALATION) at 08:05

## 2018-01-19 RX ADMIN — SENNOSIDES AND DOCUSATE SODIUM 1 TABLET: 8.6; 5 TABLET ORAL at 20:20

## 2018-01-19 RX ADMIN — TORSEMIDE 100 MG: 100 TABLET ORAL at 17:35

## 2018-01-19 RX ADMIN — TORSEMIDE 100 MG: 100 TABLET ORAL at 08:03

## 2018-01-19 RX ADMIN — METRONIDAZOLE 500 MG: 500 TABLET ORAL at 11:49

## 2018-01-19 RX ADMIN — ASPIRIN 81 MG CHEWABLE TABLET 81 MG: 81 TABLET CHEWABLE at 08:04

## 2018-01-19 RX ADMIN — METOPROLOL SUCCINATE 100 MG: 100 TABLET, EXTENDED RELEASE ORAL at 08:05

## 2018-01-19 RX ADMIN — CALCIUM ACETATE 667 MG: 667 CAPSULE ORAL at 08:03

## 2018-01-19 RX ADMIN — SPIRONOLACTONE 50 MG: 25 TABLET ORAL at 08:04

## 2018-01-19 RX ADMIN — TRAMADOL HYDROCHLORIDE 50 MG: 50 TABLET, COATED ORAL at 21:59

## 2018-01-19 RX ADMIN — ACETAMINOPHEN 650 MG: 325 TABLET ORAL at 22:17

## 2018-01-19 RX ADMIN — TRAMADOL HYDROCHLORIDE 50 MG: 50 TABLET, COATED ORAL at 16:07

## 2018-01-19 RX ADMIN — INSULIN ASPART 1 UNITS: 100 INJECTION, SOLUTION INTRAVENOUS; SUBCUTANEOUS at 08:03

## 2018-01-19 RX ADMIN — ALLOPURINOL 400 MG: 300 TABLET ORAL at 08:04

## 2018-01-19 RX ADMIN — METRONIDAZOLE 500 MG: 500 TABLET ORAL at 21:59

## 2018-01-19 RX ADMIN — AMLODIPINE BESYLATE 5 MG: 5 TABLET ORAL at 08:05

## 2018-01-19 RX ADMIN — SENNOSIDES AND DOCUSATE SODIUM 1 TABLET: 8.6; 5 TABLET ORAL at 08:05

## 2018-01-19 RX ADMIN — LOSARTAN POTASSIUM 100 MG: 50 TABLET ORAL at 08:03

## 2018-01-19 RX ADMIN — HYDRALAZINE HYDROCHLORIDE 25 MG: 25 TABLET ORAL at 14:51

## 2018-01-19 RX ADMIN — HYDRALAZINE HYDROCHLORIDE 25 MG: 25 TABLET ORAL at 20:20

## 2018-01-19 RX ADMIN — MICAFUNGIN SODIUM 100 MG: 10 INJECTION, POWDER, LYOPHILIZED, FOR SOLUTION INTRAVENOUS at 20:20

## 2018-01-19 RX ADMIN — INSULIN ASPART 3 UNITS: 100 INJECTION, SOLUTION INTRAVENOUS; SUBCUTANEOUS at 14:46

## 2018-01-19 RX ADMIN — METRONIDAZOLE 500 MG: 500 TABLET ORAL at 17:35

## 2018-01-19 RX ADMIN — INSULIN ASPART 2 UNITS: 100 INJECTION, SOLUTION INTRAVENOUS; SUBCUTANEOUS at 20:19

## 2018-01-19 ASSESSMENT — PAIN DESCRIPTION - DESCRIPTORS
DESCRIPTORS: ACHING
DESCRIPTORS: ACHING

## 2018-01-19 NOTE — PLAN OF CARE
Problem: Patient Care Overview  Goal: Plan of Care/Patient Progress Review  Outcome: Improving  AVSS. A&Ox4. Apical pulse regular. Lungs CTA. Abdominal incision c/d/i. Bowel sounds active in all quadrants. Tolerating regular diet. Voiding spontaneously with adequate output. IV medication running but PIV will return to be saline locked. Pain controlled with Tylenol this shift. Continue with POC. Pt would not like bedside report.

## 2018-01-19 NOTE — PLAN OF CARE
Problem: Patient Care Overview  Goal: Plan of Care/Patient Progress Review  Outcome: Improving  AVSS. Pt denies pain and nausea. Tramadol and tylenol in place for pain. Abd inc c/d/i. RPIV saline locked. IJ intact. Tolerating regular diet. Passing gas, last BM on 1/18. Pt on hemodialysis. Voids small amounts. Pt would only like report at shift change if awake.

## 2018-01-19 NOTE — PROGRESS NOTES
Care Coordinator Progress Note     Admission Date/Time:  1/7/2018  Attending MD:  Breanne Miller MD     Data  Chart reviewed, discussed with interdisciplinary team.   Patient was admitted for:    SBP (spontaneous bacterial peritonitis) (H)  CKD (chronic kidney disease) stage 5, GFR less than 15 ml/min (H).    Concerns with insurance coverage for discharge needs: None.  Current Living Situation: Patient lives with spouse.  Support System: Supportive and Involved  Services Involved: Dialysis Services  Transportation: Car; patient drives himself  Barriers to Discharge: final IV/HD ABX plan    Coordination of Care and Referrals:   1/19: Report received from Medical Team; patient WBC has increased and he is not feeling well (Vanco was stopped three days ago, being restarted today). IV ABX & PICC w/ or w/o HD ABX plan is not yet decided.  Orders for HD have not yet been sent to [facility]. Anticipated discharged for today is cancelled. [facility] Admissions (Ph: [phone]) and [facility] facility (Ph: [phone]) updated by phone that patient will not start tomorrow. Next possible start date is Tuesday 1/23/18; [facility] Medical Team is aware.    Addendum 1/16 @ 1441: Confirmation of reserved chair time, start 615 AM with TTS schedule, received from Jacquelin at [facility] (Ph: [phone]). They are ready and happy to accept patient for HD at time of d/c, including a Saturday start.     1/16: Discussed location options provided by [facility] with patient, he has chosen the [facility] location at [address] [place] [address] which has may have a 615 AM chair time available. Writer called [facility] ([place] Ph: [phone]) to request reserving this chair time for patient. Information will be submitted to [facility]  - presently waiting for reservation confirmation and start date.     1/15: D: Chart reviewed and plan of care discussed with MD team in interdisciplinary rounds  and patient at bedside. Patient admitted for bacterial peritonitis also found to be Influenza positive. Plan for patient to discharge when both In Center Hemo Dialysis has been arranged and confirmed and IV Antibiotics (whether by HD or by PICC) has been arranged and confirmed.     Central Dialysis Cath placed today; first HD to be tomorrow Tuesday. Pending clinical course RN CC best estimation for first outpatient HD at [facility] is Saturday 1/20 or Tuesday 1/23.    I/A: Provided patient with options for In-Center Hemo Dialysis Services; patient wishes to continue with [facility] (PD service was with [facility]). His preference is a facility closer to home (zip code [phone]). A Tuesday, Thursday, Saturday dialysis schedule would work best with his work schedule. Information gathered from [employer] and 41 page fax sent to [facility] Admissions at 1340 today. Hep B panel ordered, needs to be faxed after resulted.     P: Care coordinator will remain available for discharge needs that may arise.      Plan  Anticipated Discharge Date:  Monday 1/22  Anticipated Discharge Plan:  Home    Carrie MOSELEY RN PHN  Patient Care Management Coordinator  [facility] [place] [facility]  Phone: [phone] / Pager: [phone]]

## 2018-01-19 NOTE — PROGRESS NOTES
Nephrology Progress Note  01/19/2018         Assessment & Recommendations:   Murray Nicholson is a 63 year old year old male end-stage renal disease on peritoneal dialysis due to diabetes and hypertension admitted for recurrent peritonitis he was treated with ceftazidime 1500 mg intraperitoneal.  Presented with a cloudy PD fluid on admission.  Patient found to have multi-organisms Bacteroides, Candida glabrata, staph epi, E. coli, Pseudomonas.  He is also presented with a flu treated with Tamiflu.  PD catheter out January 15, started in IHD January 16.    End-stage renal disease: Under care of Dr. Bobo on PD since June 2017, PD catheter was out due to peritonitis fungal, IHD started will schedule for TTS.  Still making urine 200 a day, his original dry weight is 86 kg, but he is on multiple blood pressure medication, we will target a dry weight of 80-82 kg.    PD related peritonitis: Multiple organisms cultured positive for Pseudomonas E. coli, strep, Candida, Bacteroides, staph epi.  Treated with multiple course of antibiotic now coverage is for metronidazole.  Completed course of ceftazidime.  Completed gentamicin.  On micafungin, ID is following following recommendation.  His current antibiotic regimen is metronidazole, micafungin, vancomycin.    Blood pressure and volume: Blood pressure is controlled, but he is on multiple agents, we will try to hold some of his blood pressure medication.  Please stop amlodipine.  Continue  losartan, metoprolol, hydralazine, Spironolactone.  Target blood pressure 130/80, we will challenge his dry weight target weight of 80-82.    Electrolyte: Potassium 4 no issues.    Acid-base: Bicarb 26 no issues.    Anemia: Hemoglobin 9.6, recent iron studies shows ferritin is high, he is EPO 30,000 units every 2 weeks.  We will change it with the weekly dialysis.    BMD: Calcium normal phosphorous normal, vitamin D deficiency continue supplementation.    Recommendations were communicated to  primary team via note.    Seen and discussed with Dr. Tena.    Bouchra Saunders MD   [phone]    Interval History :   In the last 24 hours Murray Nicholson feels fine no major events overnight, he did feel weakness and tiredness after dialysis.  Earlier this morning his white cell count was elevated, but he denies any  fevers or chills, no chest pain or shortness of breath, no other complaints  Review of Systems:   I reviewed the following systems:  GI: Good appetite.  No nausea or vomiting or diarrhea.   Neuro: No no confusion  Constitutional: No fever or chills  CV: No dyspnea.  No chest pain.    Physical Exam:   I/O last 3 completed shifts:  In: 900 [P.O.:900]  Out: 225 [Urine:225]   /75  Pulse 100  Temp 97.5  F (36.4  C) (Oral)  Resp 18  Wt 82.7 kg (182 lb 4.8 oz)  SpO2 100%  BMI 26.92 kg/m2     GENERAL APPEARANCE: NAD  EYES: No scleral icterus, pupils equal  HENT: mouth without ulcers or lesions  PULM: lungs clear to auscultation,  bilaterally, equal air movement, no clubbing  CV: regular rhythm, normal rate, no rub     -JVD not elevated     -edema 1+  GI: soft, mild tender, non-distended, bowel sounds are present  INTEGUMENT: no cyanosis, no rash  NEURO: No asterixis   Access right IJ tunneled catheter    Labs:   All labs reviewed by me  Electrolytes/Renal -   Recent Labs   Lab Test  01/19/18   0814  01/18/18   0746  01/17/18   0900   01/13/18   0956  01/12/18   0742   11/21/17   1239  11/15/17   0758  11/14/17   0645   NA  137  138  138   < >  137  137   < >  142  144  141   POTASSIUM  4.0  3.7  3.9   < >  3.9  3.5   < >  4.6  3.2*  3.7   CHLORIDE  104  105  101   < >  97  98   < >  102  103  103   CO2  26  26  28   < >  32  32   < >  29  30  27   BUN  31*  22  34*   < >  39*  38*   < >  66*  69*  76*   CR  5.44*  4.76*  7.08*   < >  8.52*  8.27*   < >  7.65*  6.98*  6.85*   GLC  177*  128*  122*   < >  211*  250*   < >  189*  294*  312*   TRINI  8.7  8.0*  8.7   < >  8.6  8.4*   < >  8.9  8.9  8.9    MAG   --    --    --    --   1.8  1.7   --   1.9  1.9  1.8   PHOS   --    --    --    --    --   3.3   --    --   5.0*  4.3    < > = values in this interval not displayed.       CBC -   Recent Labs   Lab Test  01/19/18   0814  01/18/18   0746  01/17/18   0900   WBC  15.0*  11.2*  13.5*   HGB  9.6*  9.1*  9.1*   PLT  281  295  324       LFTs -   Recent Labs   Lab Test  01/07/18   1655  11/13/17   1709  08/02/17   1311  07/05/17   1204   ALKPHOS  76  104   --   98   BILITOTAL  0.4  0.8   --   0.5   ALT  24  26   --   15   AST  32  22   --   13   PROTTOTAL  6.0*  6.0*   --   6.2*   ALBUMIN  1.8*  2.3*  3.2*  2.7*       Iron Panel -   Recent Labs   Lab Test  07/19/17   1306  07/05/17   1204  06/21/17   1058   IRON  46  26*  69   IRONSAT  18  12*  29   ALBERTINA  369  542*  557*         Imaging:  All imaging studies reviewed by me.     Current Medications:    heparin Lock (1000 units/mL High concentration)  3 mL Intracatheter Once     heparin Lock (1000 units/mL High concentration)  3 mL Intracatheter Once     cefTAZidime  1 g Intravenous Q24H     micafungin  100 mg Intravenous Q24H     metroNIDAZOLE  500 mg Oral Q6H JEAN     insulin aspart  1-10 Units Subcutaneous TID AC     insulin aspart  1-7 Units Subcutaneous At Bedtime     allopurinol  400 mg Oral Daily     hydrALAZINE  25 mg Oral TID     torsemide  100 mg Oral BID     predniSONE  5 mg Oral Daily     senna-docusate  1 tablet Oral BID     spironolactone  50 mg Oral Daily     amLODIPine  5 mg Oral Daily     aspirin  81 mg Oral Daily     atorvastatin  40 mg Oral Daily     fluticasone  1-2 spray Both Nostrils Daily     calcitRIOL  0.25 mcg Oral Daily     calcium acetate  667 mg Oral TID w/meals     omeprazole  20 mg Oral Daily     losartan  100 mg Oral Daily     metoprolol succinate  100 mg Oral Daily       - MEDICATION INSTRUCTIONS -       Bouchra Saunders MD

## 2018-01-19 NOTE — PROGRESS NOTES
INTERNAL MEDICINE PROGRESS NOTE    Murray Nicholson ([id number]) admitted on 1/7/2018 01/19/2018    Assessment & Plan: Murray Nicholson is a 63 year old male with history of T2DM, MGUS, HLD, HTN, ESRD (likely 2/2 DM,HTN) on PD, HFrEF (EF - 20-25% with severe global hypokinesis), anemia of chronic disease and gout on allopurinol and prednisone who is admitted for further treatment of bacterial peritonitis that has failed outpatient treatment. Patient hemodynamically stable, PD removal 1/15 with tunneled catheter placement 1/16, undergoing HD runs, consolidating antibiotics. Increased WBC and abdominal pain, resuming vancomycin.      Plan For Today:  -Initially added back vancomycin - will discontinue per ID      # Bacterial peritonitis s/p treatment failure (9 day intraperitoneal course of Ceftazidime).  # ESRD 2/2 T2Dm, HTN - on PD now s/p catheter removal with tunneled placement 1/16  Patient with abdominal pain, cloudy dialysate and s/p 9 day course of intraperitoneal ceftazidime for suspected peritonitis as outpatient without improvement. Cultures were done at [facility]. Per Nephrology note, peritoneal fluid notable for E.Coli, Pseudomonas and Streptococcus. Now growing candida glabrata and bacteroides caccae on day 4. WBC in peritoneal fluid originally improving, but did increase again, concerning for biofilm on PD catheter which was removed 1/16. Have discontinued vanc/gent, will have completed ceftazidime 1/19, at that point only micafungin and metronidazole will remain. Felt crummy this morning, possibly related to HD on 3 consecutive days.  - PD catheter removal 1/15  - ID consulted, appreciate recs  - Antimicrobials:    -ceftazidime  Stop date 1/19    -metronidazole    -vancomycin -> d/c 1/16 briefly added back 1/19 for staph epi and elevated WBC however will d/c per ID note    -micafungin - awaiting sensitivities    -gentamycin -> d/c 1/17  - Tunneled catheter via IR 1/16 - will need 2-3 HD runs prior to discharge  to outpatient dialysis - setup at [facility] Nephrology following, appreciate recs  - Continue PTA calcitriol, and phoslo      # Influenza  CXR on 1/11 showed right LL opacity.  - Continue flonase  - Tessalon  - Tamiflu completed      # Hypokalemia  - Replace PRN      # T2DM  Last A1C - 6.4 in 6/2017, but 9.1 on admission. On Glipizide PTA and no insulin. BG has been elevated this admission likely due to acute infection, but based on his A1c his BG hasn't been well controlled in recent months.  - Hold Glipizide due to hypoglycemia risk. Will resume at discharge  - High dose SSI  - Glucose checks  - Hypoglycemia protocol      #HFrEF (EF 20-25% on 4/2017 TTE) - Stage C, NYHA class III  #CAD  #HTN  #HLD  #Ascending aortic aneurysm  No cardiorespiratory symptoms on presentation. On Losartan, Toprol and ASA. Last Echo 4/2017 with  coronary angiogram yet to be done, although ordered. Ascending aortic aneurysm 4.8cm as at last check 8/2/2017.   - Continue PTA ASA 81mg daily  - Continue PTA Losartan 100mg daily  - Continue  PTA Toprol XL 100mg daily - held 1/15 due to soft BP  - Continue PTA Torsemide 100mg BID (was on bumex but currently on torsemide which is a recent change)  - Continue PTA Atorvastatin 40mg daily  - Continue PTA Amlodipine 5 mg daily  - C[facility] clinic referral on d/c  - Continue  PTA spironolactone 50mg daily   - Restart PTA Hydralazine at a lower dose 25mg TID (50mg TID at home)  - Hold Imdur      CHRONIC PROBLEMS  # Gout: Stable. Continue Allopurinol, prednisone (patient reports he gets frequent flares)  # MGUS: Stable.  # Anemia of chronic disease: Stable    # GERD: Stable. On Omeprazole       Diet: normal  Fluids: none  DVT Prophylaxis: Pneumatic Compression Devices, ambulate   Code Status: Full Code      Disposition Plan: Expected discharge after antimicrobial regimen finalized.      Patient discussed with attending provider Dr. Aguayo.      Markell Gonzalez  Internal Medicine  PGY2  ==================================================================  Subjective:  Feeling crummy this morning, tired and increased abdominal pain, no fevers or chills, eating and drinking without difficulty. No new pain.    ROS: negative other than listed above    Objective:  Most recent vital signs:  /72  Pulse 102  Temp 98.2  F (36.8  C) (Oral)  Resp 18  Wt 82.7 kg (182 lb 4.8 oz)  SpO2 100%  BMI 26.92 kg/m2  Temp:  [96.3  F (35.7  C)-98.3  F (36.8  C)] 98.2  F (36.8  C)  Pulse:  [] 102  Resp:  [16-18] 18  BP: (110-130)/(65-85) 124/72  SpO2:  [96 %-100 %] 100 %  Wt Readings from Last 2 Encounters:   01/18/18 82.7 kg (182 lb 4.8 oz)   12/15/17 90.3 kg (199 lb)     Intake/Output Summary (Last 24 hours) at 01/19/18 1413  Last data filed at 01/19/18 1100   Gross per 24 hour   Intake              900 ml   Output              225 ml   Net              675 ml     Physical exam:  General: Patient lying comfortably in bed, NAD  HEENT: EOMI, no scleral icterus or injection, no JVD  Cardiac: tachycardic, regular, no m/r/g appreciated.   Respiratory: CTAB, no wheezes, rhonchi or crackles appreciated.  GI: mild tenderness to palpation, no rebound or guarding, normoactive bowel sounds  Extremities: trace LE edema - similar  Skin: No acute lesions appreciated  Neuro: AOx3, CN II-XII grossly intact, fluent speech, no apparent focal deficits    Results for SANCHEZ MCNEIL (MRN [id number]) as of 1/19/2018 14:13   Ref. Range 1/19/2018 08:14   Sodium Latest Ref Range: 133 - 144 mmol/L 137   Potassium Latest Ref Range: 3.4 - 5.3 mmol/L 4.0   Chloride Latest Ref Range: 94 - 109 mmol/L 104   Carbon Dioxide Latest Ref Range: 20 - 32 mmol/L 26   Urea Nitrogen Latest Ref Range: 7 - 30 mg/dL 31 (H)   Creatinine Latest Ref Range: 0.66 - 1.25 mg/dL 5.44 (H)   GFR Estimate Latest Ref Range: >60 mL/min/1.7m2 11 (L)   GFR Estimate If Black Latest Ref Range: >60 mL/min/1.7m2 13 (L)   Calcium Latest Ref Range: 8.5 - 10.1 mg/dL  8.7   Anion Gap Latest Ref Range: 3 - 14 mmol/L 7   Glucose Latest Ref Range: 70 - 99 mg/dL 177 (H)   WBC Latest Ref Range: 4.0 - 11.0 10e9/L 15.0 (H)   Hemoglobin Latest Ref Range: 13.3 - 17.7 g/dL 9.6 (L)   Hematocrit Latest Ref Range: 40.0 - 53.0 % 30.6 (L)   Platelet Count Latest Ref Range: 150 - 450 10e9/L 281   RBC Count Latest Ref Range: 4.4 - 5.9 10e12/L 3.08 (L)   MCV Latest Ref Range: 78 - 100 fl 99   MCH Latest Ref Range: 26.5 - 33.0 pg 31.2   MCHC Latest Ref Range: 31.5 - 36.5 g/dL 31.4 (L)   RDW Latest Ref Range: 10.0 - 15.0 % 17.4 (H)

## 2018-01-19 NOTE — PHARMACY-VANCOMYCIN DOSING SERVICE
Pharmacy Vancomycin Initial Note  Date of Service 2018  Patient's  1955  63 year old, male    Indication: Intra-abdominal infection    Current estimated CrCl = CrCl cannot be calculated (Unknown ideal weight.).    Creatinine for last 3 days  2018:  9:00 AM Creatinine 7.08 mg/dL  2018:  7:46 AM Creatinine 4.76 mg/dL  2018:  8:14 AM Creatinine 5.44 mg/dL    Recent Vancomycin Level(s) for last 3 days  No results found for requested labs within last 72 hours.      Vancomycin IV Administrations (past 72 hours)      No vancomycin orders with administrations in past 72 hours.                Nephrotoxins and other renal medications (Future)    Start     Dose/Rate Route Frequency Ordered Stop    18 1130  vancomycin (VANCOCIN) 1,750 mg in NaCl 0.9 % 500 mL intermittent infusion      1,750 mg  over 2 Hours Intravenous ONCE 18 1053      01/10/18 1600  torsemide (DEMADEX) tablet 100 mg      100 mg Oral 2 TIMES DAILY. 01/10/18 1157            Contrast Orders - past 72 hours     None                Plan:  1.  Start vancomycin  1750 mg once today 18   2.  Goal Trough Level: 10-15 mg/L   3.  Pharmacy will check trough levels as appropriate in 1-3 Days prior to next hemodialysis session  4. Serum creatinine levels will be ordered daily for the first week of therapy and at least twice weekly for subsequent weeks.    5. [place] method utilized to dose vancomycin therapy: Method 2    Ragini Marcial, PharmD

## 2018-01-20 VITALS
OXYGEN SATURATION: 97 % | TEMPERATURE: 97.6 F | HEART RATE: 105 BPM | DIASTOLIC BLOOD PRESSURE: 67 MMHG | RESPIRATION RATE: 18 BRPM | WEIGHT: 176.9 LBS | BODY MASS INDEX: 26.12 KG/M2 | SYSTOLIC BLOOD PRESSURE: 116 MMHG

## 2018-01-20 LAB
ANION GAP SERPL CALCULATED.3IONS-SCNC: 9 MMOL/L (ref 3–14)
BACTERIA SPEC CULT: ABNORMAL
BACTERIA SPEC CULT: NO GROWTH
BUN SERPL-MCNC: 38 MG/DL (ref 7–30)
CALCIUM SERPL-MCNC: 9.1 MG/DL (ref 8.5–10.1)
CHLORIDE SERPL-SCNC: 105 MMOL/L (ref 94–109)
CO2 SERPL-SCNC: 26 MMOL/L (ref 20–32)
CREAT SERPL-MCNC: 6.89 MG/DL (ref 0.66–1.25)
ERYTHROCYTE [DISTWIDTH] IN BLOOD BY AUTOMATED COUNT: 17.5 % (ref 10–15)
GFR SERPL CREATININE-BSD FRML MDRD: 8 ML/MIN/1.7M2
GLUCOSE BLDC GLUCOMTR-MCNC: 153 MG/DL (ref 70–99)
GLUCOSE SERPL-MCNC: 137 MG/DL (ref 70–99)
HCT VFR BLD AUTO: 33.2 % (ref 40–53)
HGB BLD-MCNC: 10 G/DL (ref 13.3–17.7)
LACTATE BLD-SCNC: 1 MMOL/L (ref 0.7–2)
MCH RBC QN AUTO: 30.6 PG (ref 26.5–33)
MCHC RBC AUTO-ENTMCNC: 30.1 G/DL (ref 31.5–36.5)
MCV RBC AUTO: 102 FL (ref 78–100)
PLATELET # BLD AUTO: 340 10E9/L (ref 150–450)
POTASSIUM SERPL-SCNC: 4.4 MMOL/L (ref 3.4–5.3)
RBC # BLD AUTO: 3.27 10E12/L (ref 4.4–5.9)
SODIUM SERPL-SCNC: 140 MMOL/L (ref 133–144)
SPECIMEN SOURCE: ABNORMAL
SPECIMEN SOURCE: NORMAL
WBC # BLD AUTO: 13 10E9/L (ref 4–11)

## 2018-01-20 PROCEDURE — 80048 BASIC METABOLIC PNL TOTAL CA: CPT | Performed by: STUDENT IN AN ORGANIZED HEALTH CARE EDUCATION/TRAINING PROGRAM

## 2018-01-20 PROCEDURE — 25000132 ZZH RX MED GY IP 250 OP 250 PS 637: Performed by: HOSPITALIST

## 2018-01-20 PROCEDURE — 80202 ASSAY OF VANCOMYCIN: CPT | Performed by: PEDIATRICS

## 2018-01-20 PROCEDURE — 25000132 ZZH RX MED GY IP 250 OP 250 PS 637: Performed by: PEDIATRICS

## 2018-01-20 PROCEDURE — 25000132 ZZH RX MED GY IP 250 OP 250 PS 637: Performed by: STUDENT IN AN ORGANIZED HEALTH CARE EDUCATION/TRAINING PROGRAM

## 2018-01-20 PROCEDURE — 25000132 ZZH RX MED GY IP 250 OP 250 PS 637: Performed by: INTERNAL MEDICINE

## 2018-01-20 PROCEDURE — 85027 COMPLETE CBC AUTOMATED: CPT | Performed by: STUDENT IN AN ORGANIZED HEALTH CARE EDUCATION/TRAINING PROGRAM

## 2018-01-20 PROCEDURE — 90937 HEMODIALYSIS REPEATED EVAL: CPT

## 2018-01-20 PROCEDURE — 36415 COLL VENOUS BLD VENIPUNCTURE: CPT | Performed by: STUDENT IN AN ORGANIZED HEALTH CARE EDUCATION/TRAINING PROGRAM

## 2018-01-20 PROCEDURE — 83605 ASSAY OF LACTIC ACID: CPT | Performed by: NURSE PRACTITIONER

## 2018-01-20 PROCEDURE — 36415 COLL VENOUS BLD VENIPUNCTURE: CPT | Performed by: NURSE PRACTITIONER

## 2018-01-20 PROCEDURE — 25000128 H RX IP 250 OP 636: Performed by: INTERNAL MEDICINE

## 2018-01-20 PROCEDURE — 25000131 ZZH RX MED GY IP 250 OP 636 PS 637: Performed by: INTERNAL MEDICINE

## 2018-01-20 PROCEDURE — 99239 HOSP IP/OBS DSCHRG MGMT >30: CPT | Performed by: PEDIATRICS

## 2018-01-20 PROCEDURE — 00000146 ZZHCL STATISTIC GLUCOSE BY METER IP

## 2018-01-20 RX ORDER — FLUCONAZOLE 200 MG/1
400 TABLET ORAL
Status: DISCONTINUED | OUTPATIENT
Start: 2018-01-22 | End: 2018-01-20 | Stop reason: HOSPADM

## 2018-01-20 RX ORDER — TRAMADOL HYDROCHLORIDE 50 MG/1
50 TABLET ORAL EVERY 6 HOURS PRN
Qty: 50 TABLET | Refills: 0 | Status: ON HOLD | OUTPATIENT
Start: 2018-01-20 | End: 2018-07-02

## 2018-01-20 RX ORDER — METRONIDAZOLE 500 MG/1
500 TABLET ORAL EVERY 6 HOURS
Qty: 28 TABLET | Refills: 0 | Status: SHIPPED | OUTPATIENT
Start: 2018-01-21 | End: 2018-01-28

## 2018-01-20 RX ORDER — FLUCONAZOLE 200 MG/1
800 TABLET ORAL ONCE
Status: COMPLETED | OUTPATIENT
Start: 2018-01-20 | End: 2018-01-20

## 2018-01-20 RX ORDER — FLUCONAZOLE 200 MG/1
400 TABLET ORAL DAILY
Qty: 14 TABLET | Refills: 0 | Status: SHIPPED | OUTPATIENT
Start: 2018-01-21 | End: 2018-01-28

## 2018-01-20 RX ADMIN — AMLODIPINE BESYLATE 5 MG: 5 TABLET ORAL at 12:39

## 2018-01-20 RX ADMIN — CALCIUM ACETATE 667 MG: 667 CAPSULE ORAL at 12:38

## 2018-01-20 RX ADMIN — ACETAMINOPHEN 650 MG: 325 TABLET ORAL at 03:56

## 2018-01-20 RX ADMIN — METOPROLOL SUCCINATE 100 MG: 100 TABLET, EXTENDED RELEASE ORAL at 12:40

## 2018-01-20 RX ADMIN — HYDRALAZINE HYDROCHLORIDE 25 MG: 25 TABLET ORAL at 12:40

## 2018-01-20 RX ADMIN — SPIRONOLACTONE 50 MG: 25 TABLET ORAL at 09:37

## 2018-01-20 RX ADMIN — SENNOSIDES AND DOCUSATE SODIUM 1 TABLET: 8.6; 5 TABLET ORAL at 12:40

## 2018-01-20 RX ADMIN — SODIUM CHLORIDE 250 ML: 9 INJECTION, SOLUTION INTRAVENOUS at 08:34

## 2018-01-20 RX ADMIN — OMEPRAZOLE 20 MG: 20 CAPSULE, DELAYED RELEASE ORAL at 12:40

## 2018-01-20 RX ADMIN — FLUCONAZOLE 800 MG: 200 TABLET ORAL at 15:58

## 2018-01-20 RX ADMIN — CALCITRIOL 0.25 MCG: 0.25 CAPSULE, LIQUID FILLED ORAL at 12:40

## 2018-01-20 RX ADMIN — TRAMADOL HYDROCHLORIDE 50 MG: 50 TABLET, COATED ORAL at 03:56

## 2018-01-20 RX ADMIN — ALLOPURINOL 400 MG: 300 TABLET ORAL at 12:39

## 2018-01-20 RX ADMIN — ASPIRIN 81 MG CHEWABLE TABLET 81 MG: 81 TABLET CHEWABLE at 12:40

## 2018-01-20 RX ADMIN — TORSEMIDE 100 MG: 100 TABLET ORAL at 12:40

## 2018-01-20 RX ADMIN — METRONIDAZOLE 500 MG: 500 TABLET ORAL at 05:43

## 2018-01-20 RX ADMIN — INSULIN ASPART 1 UNITS: 100 INJECTION, SOLUTION INTRAVENOUS; SUBCUTANEOUS at 12:47

## 2018-01-20 RX ADMIN — FLUTICASONE PROPIONATE 2 SPRAY: 50 SPRAY, METERED NASAL at 12:40

## 2018-01-20 RX ADMIN — Medication: at 08:34

## 2018-01-20 RX ADMIN — SODIUM CHLORIDE 1000 ML: 9 INJECTION, SOLUTION INTRAVENOUS at 08:34

## 2018-01-20 RX ADMIN — LOSARTAN POTASSIUM 100 MG: 50 TABLET ORAL at 12:40

## 2018-01-20 RX ADMIN — METRONIDAZOLE 500 MG: 500 TABLET ORAL at 12:50

## 2018-01-20 RX ADMIN — PREDNISONE 5 MG: 2.5 TABLET ORAL at 12:40

## 2018-01-20 ASSESSMENT — PAIN DESCRIPTION - DESCRIPTORS: DESCRIPTORS: ACHING

## 2018-01-20 NOTE — DISCHARGE SUMMARY
[facility], [place]    Internal Medicine Discharge Summary    Date of Admission:  1/7/2018  Date of Discharge:  1/20/2018  5:23 PM  Discharging Provider: Naresh Aguayo  Discharge Team: Lucas Sue    Discharge Diagnoses   Polymicrobial Bacterial Peritonitis (failed outpatient treatment) s/p Removal of PD Catheter  POA: Y  ESRD 2/2 T2DM on Peritoneal Dialysis  POA: Y  Influenza  POA: N  Hypokalemia  POA: Y     Other Chronic Issues:  HFrEF (EF 20-25% on 4/2017 TTE) - Stage C, NYHA class III  CAD  HTN  HLD  DM II  Ascending aortic aneurysm  Gout  Anemia of Chronic Disease  MGUS  GERD    Follow-ups Needed After Discharge   1. Follow up with PCP to recheck labs and ensure infection is improving  2. Will be undergoing HD at [facility]   Murray Nicholson is a 63 year old male with history of T2DM, MGUS, HLD, HTN, ESRD (likely 2/2 DM,HTN) on PD, HFrEF (EF - 20-25% with severe global hypokinesis), anemia of chronic disease and Gout on allopurinol and prednisone who presented with abdominal pain that started 12/29. He was started on IP cetazidime without improvement in symptoms, so he came to the hospital for further evaluation.      Please see H&P for further details.      The following problems were addressed during his hospitalization:    # Polymicrobial Bacterial Peritonitis (failed outpatient treatment) s/p Removal of PD Catheter   # ESRD 2/2 T2DM on Peritoneal Dialysis   # Sepsis - poly microbial - gram negative, gram positive, and fungal  Patient with abdominal pain, cloudy dialysate and s/p 9 day course of intraperitoneal ceftazidime for suspected peritonitis as outpatient without improvement. Cultures were done at [facility]. Per Nephrology note, peritoneal fluid notable for E.Coli, Pseudomonas and Streptococcus. Eventually also grew candida glabrata and bacteroides caccae on day 4. WBC in peritoneal fluid originally improving, but did increase again, concerning for  biofilm on PD catheter which was removed 1/16. ID was involved to help manage this polymicrobial infection. Was discharged on oral fluconazole and metronidazole after completing course of ceftazidime. Will be undergoing HD on Tu/Th/Sa schedule via tunneled catheter that was placed by IR.  - PD catheter removal 1/15  - ID consulted  - Antimicrobials:    -ceftazidime - completed 1/19    -metronidazole - end date 1/27    -vancomycin -> d/c 1/16 briefly added back 1/19 for staph epi and elevated WBC however discontinued    - micafungin -> fluconazole (1/20) - end date 1/27    -gentamycin -> d/c 1/17  - Tunneled catheter via IR 1/16 - will need 2-3 HD runs prior to discharge to outpatient dialysis - setup at [facility]  - Dialysis Tu/Th/Sa  - Continue PTA calcitriol, and phoslo      # Influenza  CXR on 1/11 showed right LL opacity.  - Flonase  - Tessalon  - Tamiflu completed      # Hypokalemia  Replaced PRN      # T2DM  Last A1C - 6.4 in 6/2017, but 9.1 on admission. On Glipizide PTA and no insulin. BG has been elevated this admission likely due to acute infection, but based on his A1c his BG hasn't been well controlled in recent months.  - Hold Glipizide due to hypoglycemia risk. Will resume at discharge  - High dose SSI  - Glucose checks  - Hypoglycemia protocol      #HFrEF (EF 20-25% on 4/2017 TTE) - Stage C, NYHA class III  #CAD  #HTN  #HLD  #Ascending aortic aneurysm  No cardiorespiratory symptoms on presentation. On Losartan, Toprol and ASA. Last Echo 4/2017 with  coronary angiogram yet to be done, although ordered. Ascending aortic aneurysm 4.8cm as at last check 8/2/2017.   - Continue PTA ASA 81mg daily  - Continue PTA Losartan 100mg daily  - Continue  PTA Toprol XL 100mg daily - held 1/15 due to soft BP  - Continue PTA Torsemide 100mg BID (was on bumex but currently on torsemide which is a recent change)  - Continue PTA Atorvastatin 40mg daily  - Continue PTA Amlodipine 5 mg daily  - CORE clinic referral on d/c  -  Continue  PTA spironolactone 50mg daily   - Restart PTA Hydralazine at a lower dose 25mg TID (50mg TID at home)      CHRONIC PROBLEMS  # Gout: Stable. Continue Allopurinol, prednisone (patient reports he gets frequent flares)  # MGUS: Stable.  # Anemia of chronic disease: Stable    # GERD: Stable. On Omeprazole     Consultations This Hospital Stay   1. Nephrology  2. ID  3. Surgery  4. IR    Code Status   Full Code    Time Spent on this Encounter   I, Naresh Aguayo, personally saw the patient today and spent greater than 30 minutes discharging this patient.       Naresh Aguayo  Internal Medicine Staff Hospitalist Service  [facility]  Pager: [phone]  ______________________________________________________________________    Physical Exam   Vital Signs: Temp: 97.6  F (36.4  C) Temp src: Oral BP: 116/67 Pulse: 105 Heart Rate: 111 Resp: 18 SpO2: 97 % O2 Device: None (Room air)    Weight: 176 lbs 14.4 oz    General Appearance: No acute distress resting comfortably in bed.  Pleasant conversation.  Respiratory: Clear bilaterally no wheezes or crackles  Cardiovascular: Regular rate and rhythm no murmurs gallops or rubs  GI: Bili continues to be mildly tender the area of PD catheter removed is minimally erythematous  Skin: Warm no rash no bruising    Significant Results and Procedures   Candida glabrata and staph epidermidis from peritoneal fluid    Results for SANCHEZ MCNEIL (MRN [id number]) as of 1/21/2018 16:07   Ref. Range 1/20/2018 08:18   WBC Latest Ref Range: 4.0 - 11.0 10e9/L 13.0 (H)   Hemoglobin Latest Ref Range: 13.3 - 17.7 g/dL 10.0 (L)   Hematocrit Latest Ref Range: 40.0 - 53.0 % 33.2 (L)   Platelet Count Latest Ref Range: 150 - 450 10e9/L 340   RBC Count Latest Ref Range: 4.4 - 5.9 10e12/L 3.27 (L)   MCV Latest Ref Range: 78 - 100 fl 102 (H)   MCH Latest Ref Range: 26.5 - 33.0 pg 30.6   MCHC Latest Ref Range: 31.5 - 36.5 g/dL 30.1 (L)   RDW Latest Ref Range: 10.0 - 15.0 % 17.5 (H)        Pending Results   These results will be followed up by PCP  Unresulted Labs Ordered in the Past 30 Days of this Admission     Date and Time Order Name Status Description    1/15/2018 1704 Fungus Culture, non-blood Preliminary     1/15/2018 1704 Anaerobic bacterial culture Preliminary     1/15/2018 0000 Fungus Culture, non-blood Preliminary              Primary Care Physician   Ana Luisa Mcpherson    Discharge Disposition   Discharged to home  Condition at discharge: Stable    Discharge Orders     Reason for your hospital stay   Poly-microbial peritonitis associated with the peritoneal dialysis. Transitioned to HD and has run 4 times. Plan for 1 more week of antifungal (fluconazole) and flagyl for antibiotics.     Adult [facility] Follow-up and recommended labs and tests   Follow up with primary care provider, Ana Luisa Mcpherson, within 7 days for hospital follow- up.     Follow up with Dr Mcpherson in nephrology    Appointments on [place] and/or [facility] (with [facility] or [facility] provider or service). Call [phone] if you haven't heard regarding these appointments within 7 days of discharge.     Follow Up and recommended labs and tests   [facility] Thurs     Activity   Your activity upon discharge: activity as tolerated - no work for  1 week or until cleared by PCP     When to contact your care team   Call your primary doctor if you have any of the following: temperature greater than 100 or less than 95,  increased shortness of breath, increased drainage, increased swelling, increased pain or any new or concerning symptoms.     Tubes and drains   You are going home with the following tubes or drains: HD Catheter.  Tube cares per hospital or home care instructions     Full Code     Diet   Follow this diet upon discharge: Orders Placed This Encounter     Regular Diet Adult       Discharge Medications   Current Discharge Medication List      START taking these medications    Details   traMADol  (ULTRAM) 50 MG tablet Take 1 tablet (50 mg) by mouth every 6 hours as needed for moderate pain  Qty: 50 tablet, Refills: 0    Associated Diagnoses: Abdominal pain, generalized      fluconazole (DIFLUCAN) 200 MG tablet Take 2 tablets (400 mg) by mouth daily for 7 days  Qty: 14 tablet, Refills: 0    Associated Diagnoses: Peritonitis (H)      metroNIDAZOLE (FLAGYL) 500 MG tablet Take 1 tablet (500 mg) by mouth every 6 hours for 7 days  Qty: 28 tablet, Refills: 0    Associated Diagnoses: Peritonitis (H)      order for DME Equipment being ordered: Carol ()  Treatment Diagnosis: ESRD on PD  Pt has to be connected to PD all night and can not be disconnected, hence impending his mobility to go to the bathroom. At risk for infection if he does not have this equipment.  Qty: 1 Units, Refills: 0    Associated Diagnoses: CKD (chronic kidney disease) stage 5, GFR less than 15 ml/min (H)         CONTINUE these medications which have NOT CHANGED    Details   gentamicin (GARAMYCIN) 0.1 % cream Apply topically daily as needed      B Complex-Biotin-FA (B-COMPLEX PO) Take 1 tablet by mouth daily      ACETAMINOPHEN PO Take 500-1,000 mg by mouth nightly as needed for pain      bismuth subsalicylate (PEPTO BISMOL) 262 MG/15ML suspension Take 15 mLs by mouth every 6 hours as needed for indigestion      torsemide (DEMADEX) 100 MG tablet Take 1 tablet (100 mg) by mouth 2 times daily  Qty: 60 tablet, Refills: 11    Associated Diagnoses: Other hypervolemia      calcium acetate, Phos Binder, 667 MG TABS Take 1 tablet by mouth 3 times daily (with meals)  Qty: 180 tablet, Refills: 3    Associated Diagnoses: Hyperphosphatemia      !! predniSONE (DELTASONE) 5 MG tablet Take 1 tablet (5 mg) by mouth daily  Qty: 90 tablet, Refills: 1    Associated Diagnoses: Gout, unspecified cause, unspecified chronicity, unspecified site; Chronic gout of wrist, unspecified cause, unspecified laterality      !! predniSONE (DELTASONE) 20 MG tablet Take 2  tablets (40 mg) by mouth daily for 3 days for gout flares  Qty: 30 tablet, Refills: 2    Associated Diagnoses: Gout, unspecified cause, unspecified chronicity, unspecified site      amLODIPine (NORVASC) 5 MG tablet Take 1 tablet (5 mg) by mouth daily  Qty: 90 tablet, Refills: 0    Associated Diagnoses: Hypertension goal BP (blood pressure) < 130/80      metoprolol (TOPROL XL) 100 MG 24 hr tablet Take 1 tablet (100 mg) by mouth daily  Qty: 90 tablet, Refills: 3    Associated Diagnoses: Chronic systolic congestive heart failure (H)      !! allopurinol (ZYLOPRIM) 100 MG tablet Take 1 tablet (100 mg) by mouth daily Take with 300 mg tabs for 400 mg /day total.  Qty: 30 tablet, Refills: 2    Associated Diagnoses: Chronic gout of wrist, unspecified cause, unspecified laterality; Gout, unspecified cause, unspecified chronicity, unspecified site      !! allopurinol (ZYLOPRIM) 300 MG tablet Take 1 tablet (300 mg) by mouth daily Take with 100 mg tabs for 400 mg /day total.  Qty: 30 tablet, Refills: 2    Associated Diagnoses: Chronic gout of wrist, unspecified cause, unspecified laterality; Gout, unspecified cause, unspecified chronicity, unspecified site      losartan (COZAAR) 100 MG tablet TAKE 1 TABLET BY MOUTH DAILY  Qty: 90 tablet, Refills: 0    Associated Diagnoses: Renal hypertension, stage 1-4 or unspecified chronic kidney disease      albuterol (PROAIR HFA/PROVENTIL HFA/VENTOLIN HFA) 108 (90 BASE) MCG/ACT Inhaler Inhale 2 puffs into the lungs every 6 hours as needed for shortness of breath / dyspnea or wheezing  Qty: 1 Inhaler, Refills: 0    Associated Diagnoses: Cough      fluticasone (FLONASE) 50 MCG/ACT spray Spray 1-2 sprays into both nostrils daily  Qty: 16 g, Refills: 11    Associated Diagnoses: Nasal congestion      spironolactone (ALDACTONE) 100 MG tablet Take 0.5 tablets (50 mg) by mouth daily  Qty: 30 tablet, Refills: 11    Associated Diagnoses: Hypokalemia; Chronic systolic congestive heart failure (H)       calcitRIOL (ROCALTROL) 0.25 MCG capsule Take 1 capsule (0.25 mcg) by mouth daily  Qty: 90 capsule, Refills: 0    Associated Diagnoses: Hypocalcemia      atorvastatin (LIPITOR) 40 MG tablet Take 1 tablet (40 mg) by mouth daily  Qty: 90 tablet, Refills: 3    Associated Diagnoses: CKD (chronic kidney disease) stage 3, GFR 30-59 ml/min; Hyperlipidemia LDL goal <100      isosorbide mononitrate (IMDUR) 60 MG 24 hr tablet Take 1 tablet (60 mg) by mouth daily  Qty: 90 tablet, Refills: 3    Associated Diagnoses: Chronic systolic congestive heart failure (H)      hydrALAZINE (APRESOLINE) 50 MG tablet Take 1 tablet (50 mg) by mouth 3 times daily  Qty: 270 tablet, Refills: 3    Associated Diagnoses: Type 2 diabetes mellitus with stage 5 chronic kidney disease not on chronic dialysis, without long-term current use of insulin (H)      omeprazole (PRILOSEC) 20 MG CR capsule Take 20 mg by mouth daily At night      loratadine (CLARITIN) 10 MG tablet Take 10 mg by mouth daily as needed Reported on 5/3/2017    Associated Diagnoses: Hypertensive cardiopathy; SOB (shortness of breath)      ASPIRIN 81 MG OR TABS Take 1 tablet (81 mg) by mouth at bedtime       !! - Potential duplicate medications found. Please discuss with provider.        Allergies   Allergies   Allergen Reactions     Norco [Hydrocodone-Acetaminophen] Nausea and Vomiting     Cats      Throat tightness     Penicillins Hives     Seasonal Allergies      rhinitis     Shrimp      Throat closes

## 2018-01-20 NOTE — PLAN OF CARE
Problem: Patient Care Overview  Goal: Plan of Care/Patient Progress Review  A&Ox4. AVSS. Pt denies pain and nausea. Tolerating regular diet. Pt up independent in room. Pt had hemodialysis this morning, returned to unit at 1200. Pt's abdomen rounded, nontender. Pt's previous PD sites to dermabond, c/d/i. Writer went through DC paperwork with pt, answered questions, pt voiced understanding. Pt transported to DC pharmacy before DCing from unit. Writer DC'd PIV. Pt DCing with IJ catheter for outpatient HD. Pt DC'd at 1710.

## 2018-01-20 NOTE — PLAN OF CARE
Problem: Patient Care Overview  Goal: Plan of Care/Patient Progress Review  Outcome: Improving  AVSS. Pain managed with tylenol and tramadol. Denies nausea. R IJ intact and PIV saline locked. Abd inc with dermabond. C/d/i. BG checks with meals and before bed with sliding scale coverage. Pt's last BM was last night. Voiding and passing gas. UAL. Plan is for HD today. Will be discharging after antimicrobial regimen is finalized. Pt would like bedside report at shift change.

## 2018-01-20 NOTE — PROGRESS NOTES
HEMODIALYSIS TREATMENT NOTE    Date: 1/20/2018  Time: 12:01 PM    Data:  Pre Wt: 83.6 kg (184 lb 4.9 oz)   Desired Wt: 80.6 kg   Post Wt: 80.6 kg (177 lb 11.1 oz)  Weight gain: -3 kg   Weight change: 3 kg  Ultrafiltration - Post Run Net Total Removed (mL): 3000 mL  Ultrafiltration - Post Run Net Total Gain (mL): 0 mL  Vascular Access Status: Yes, secured and visible  Dialyzer Rinse: Streaked  Total Blood Volume Processed: 64.3  Total Dialysis (Treatment) Time: 3.5 hours      Lab:   No    Interventions:Assessment:  Pt tolerated treatment well, with stable BPs. Tx via CVC, lines reversed mid treatment d/t flow issues. See MAR for medication details. Hand off report given to primary RN.      Plan:    Per nephrology team.

## 2018-01-20 NOTE — PLAN OF CARE
Problem: Patient Care Overview  Goal: Plan of Care/Patient Progress Review  A&Ox4. HR was 110s this morning and WBC elevated, triggered SIRS, LA was 0.8, MD notified and antimicrobial orders adjusted. AOVSS. Pt c/o abdominal pain, relieved with PRN tylenol and tramadol 1x each, reported relief. Pt denies nausea, tolerating regular diet.  and 200, insulin given per protocol before meals. Pt unable to DC today as previously planned d/t triggering SIRS and elevated WBC. Pt will receive hemodialysis tomorrow, has double lumen IJ, IJ currently SL. IV abx given via R PIV. Lap abdominal incisions to dermabond, c/d/i. Pt showered today. Pt reported BM this morning prior to AM shift, + flatus. Pt with minimal/scant urine output. Continue to monitor s/s infection.    Pt would like bedside report.

## 2018-01-20 NOTE — DISCHARGE SUMMARY
Dialysis Discharge Summary Brief    [facility]  Division of Nephrology  Nephrology Discharge Dialysis Orders  Ph: [phone]  Fax: [phone]    Murray Nicholson  MRN: [id number]  YOB: 1955    [facility] Dialysis Unit: [facility]  Primary Nephrologist: Dr Mcpherson    Date of Admission: 1/7/2018  Date of Discharge: 1/20/2018      Discharge Diagnosis:  Murray Nicholson is a 63 year old year old male end-stage renal disease on peritoneal dialysis due to diabetes and hypertension admitted for recurrent peritonitis he was treated with ceftazidime 1500 mg intraperitoneal.  Presented with a cloudy PD fluid on admission.  Patient found to have multi-organisms Bacteroides, Candida glabrata, staph epi, E. coli, Pseudomonas.  He is also presented with a flu treated with Tamiflu.  PD catheter out January 15, started in IHD January 16.  He is discharged on fluconazole and metronidazole for 7 days more after discharge  Antimicrobials:    -ceftazidime  12/29- 1/19    -metronidazole current    -vancomycin -> d/c 1/16 briefly added back 1/19 for staph epi and elevated WBC however will d/c per ID note    -micafungin - awaiting sensitivities    -gentamycin -> d/c 1/17  He had Tunneled catheter via IR 1/16 and was started on HD      ICD-10-CM    1. CKD (chronic kidney disease) stage 5, GFR less than 15 ml/min (H) N18.5 Cell count with differential fluid     Vancomycin level     order for DME     Glucose by meter     Glucose by meter     Glucose by meter     Glucose by meter     Basic metabolic panel     CBC with platelets     Magnesium     Glucose by meter     Glucose by meter     Vancomycin level     Glucose by meter     Glucose by meter     Glucose by meter     Glucose by meter     Cell count with differential fluid     Fluid Culture Aerobic Bacterial     Glucose by meter     Glucose by meter     Gentamicin level     Glucose by meter     Glucose by meter     Glucose by meter     Glucose  by meter     Basic metabolic panel     CBC with platelets     Gentamicin level     Glucose by meter     Glucose by meter     Glucose by meter     Glucose by meter     Glucose by meter     Glucose by meter     Cell count with differential fluid     Glucose by meter     Glucose by meter     Glucose by meter     Glucose by meter     Gentamicin level     Vancomycin level     Glucose by meter     Glucose by meter     Glucose by meter     Glucose by meter     Glucose by meter     Glucose by meter     Fluid Culture Aerobic Bacterial     Yeast culture     Fungus Culture, non-blood     Fungus Culture, non-blood     Anaerobic bacterial culture     Fluid Culture Aerobic Bacterial     Fungus Culture, non-blood     Gram stain     Glucose by meter     Glucose by meter     bismuth subsalicylate (PEPTO BISMOL) suspension 15 mL     Glucose by meter     Glucose by meter     Basic metabolic panel     Glucose by meter     Glucose by meter     INR     CBC with platelets     Glucose by meter     Glucose by meter     Glucose by meter     Glucose by meter     Glucose by meter     Glucose by meter     Glucose by meter     Glucose by meter     Gentamicin level     Basic metabolic panel     CBC with platelets     Glucose by meter     Glucose by meter     Lactic acid level STAT     Glucose by meter     Glucose by meter     Glucose by meter     Glucose by meter     Basic metabolic panel     CBC with platelets     Glucose by meter     Glucose by meter     0.9% sodium chloride BOLUS     0.9% sodium chloride BOLUS     No heparin via hemodialysis machine     Glucose by meter     Glucose by meter     Glucose by meter     Glucose by meter     Glucose by meter     Glucose by meter     Basic metabolic panel     CBC with platelets     Glucose by meter     Glucose by meter     Lactic acid level STAT     Glucose by meter     Glucose by meter     Glucose by meter     Glucose by meter     Glucose by meter     Glucose by meter     Basic metabolic panel      CBC with platelets     Lactic acid level STAT     Glucose by meter     Glucose by meter     DISCONTINUED: 0.9% sodium chloride BOLUS     DISCONTINUED: gelatin absorbable (GELFOAM) sponge 1 each     DISCONTINUED: sodium chloride (PF) 0.9% PF flush 3 mL     DISCONTINUED: sodium chloride (PF) 0.9% PF flush 3 mL     DISCONTINUED: sodium chloride (PF) 0.9% PF flush 10 mL     DISCONTINUED: sodium chloride (PF) 0.9% PF flush 10 mL     DISCONTINUED: alteplase (CATHFLO ACTIVASE) injection 2 mg     DISCONTINUED: alteplase (CATHFLO ACTIVASE) injection 2 mg     CANCELED: CBC with platelets     CANCELED: INR   2. SBP (spontaneous bacterial peritonitis) (H) K65.2 Blood culture     Blood culture     Cell count with differential fluid     Fluid Culture Aerobic Bacterial     Gram stain     CBC with platelets differential     Basic metabolic panel     Hemoglobin A1c     Glucose by meter     Glucose by meter     Sputum Culture Aerobic Bacterial     Gram stain     Glucose by meter     Glucose by meter     Glucose by meter     Glucose by meter     Glucose by meter     Glucose by meter     Lactic acid level STAT     CBC with platelets     Basic metabolic panel     Glucose by meter     Glucose by meter     Glucose by meter     Glucose by meter     Glucose by meter     Glucose by meter     Cell count with differential fluid     Glucose by meter     Glucose by meter     Vancomycin level     CBC with platelets     Basic metabolic panel     Glucose by meter     Glucose by meter     WBC Differential     WBC Differential     Cell count with differential fluid     Glucose by meter     Glucose by meter     Glucose by meter     Glucose by meter     Basic metabolic panel     CBC with platelets     Glucose by meter     Glucose by meter     Glucose by meter     Glucose by meter     Glucose by meter     Glucose by meter     Respiratory Virus Panel by PCR     Glucose by meter     Glucose by meter     Cell count with differential fluid     Glucose by  meter     Glucose by meter     Vancomycin level     Basic metabolic panel     Phosphorus     CBC with platelets     Glucose by meter     Glucose by meter     Glucose by meter     Glucose by meter     Glucose by meter     Glucose by meter     Magnesium     Magnesium     Glucose by meter     Glucose by meter     CANCELED: WBC Differential     CANCELED: Vancomycin level     CANCELED: Magnesium   3. Peritonitis (H) K65.9 acetaminophen (TYLENOL) tablet 975 mg     fluconazole (DIFLUCAN) 200 MG tablet     metroNIDAZOLE (FLAGYL) 500 MG tablet     DISCONTINUED: Patient RECEIVING antibiotic to treat a different condition and it provides ADEQUATE COVERAGE for this surgical procedure.   4. Abdominal pain, generalized R10.84 traMADol (ULTRAM) 50 MG tablet     Results for SANCHEZ MCNEIL (MRN [id number]) as of 1/20/2018 17:37   Ref. Range 1/19/2018 14:45 1/19/2018 20:17 1/19/2018 22:01 1/20/2018 08:18   Sodium Latest Ref Range: 133 - 144 mmol/L    140   Potassium Latest Ref Range: 3.4 - 5.3 mmol/L    4.4   Chloride Latest Ref Range: 94 - 109 mmol/L    105   Carbon Dioxide Latest Ref Range: 20 - 32 mmol/L    26   Urea Nitrogen Latest Ref Range: 7 - 30 mg/dL    38 (H)   Creatinine Latest Ref Range: 0.66 - 1.25 mg/dL    6.89 (H)   GFR Estimate Latest Ref Range: >60 mL/min/1.7m2    8 (L)   GFR Estimate If Black Latest Ref Range: >60 mL/min/1.7m2    10 (L)   Calcium Latest Ref Range: 8.5 - 10.1 mg/dL    9.1   Anion Gap Latest Ref Range: 3 - 14 mmol/L    9   Glucose Latest Ref Range: 70 - 99 mg/dL 200 (H) 189 (H) 257 (H) 137 (H)   WBC Latest Ref Range: 4.0 - 11.0 10e9/L    13.0 (H)   Hemoglobin Latest Ref Range: 13.3 - 17.7 g/dL    10.0 (L)   Hematocrit Latest Ref Range: 40.0 - 53.0 %    33.2 (L)   Platelet Count Latest Ref Range: 150 - 450 10e9/L    340   RBC Count Latest Ref Range: 4.4 - 5.9 10e12/L    3.27 (L)   MCV Latest Ref Range: 78 - 100 fl    102 (H)   MCH Latest Ref Range: 26.5 - 33.0 pg    30.6   MCHC Latest Ref Range: 31.5 -  36.5 g/dL    30.1 (L)   RDW Latest Ref Range: 10.0 - 15.0 %    17.5 (H)         New initiation, new dialysis orders       New Orders (if not applicable put NA):  Estimated Dry Weight 80kg   Dialysis Duration 3hrs   Dialysis Access Tunneled RIJ   Antibiotics (dose per dialysis, end date) Metronidazole and fluconazole  For 7days (1/27/17)              Labs to be drawn at dialysis CBC and renal panel Qweek   Other major changes to dialysis prescription (e.g. Dialysate bath, heparin, blood flow rate, etc)   Qb350, Qd 600, did not run on heparin , can use low dose heparin to start, Na 137, K 3K, Ca 2.25, HCO3 32     Medication changes (also fax the unit a copy of the discharge summary)         EPO 76263 Qmonth   Amlodipine 5 , metoprolol 100 Q24 , losartan 100mg, spironolactone 50, torsemide 100mg daily and hydralazine 25mg TID  phoslo and Abx as above     Name of physician completing this form: Madyson Calle MD, MD

## 2018-01-20 NOTE — DISCHARGE INSTRUCTIONS
Outpatient Hemodialysis (HD) Information:      Site:  [facility]  Address:  [address] [place] [address]  Phone number:  [phone]  Schedule:  Tuesday, Thursday, and Saturday morning at 6:15AM.    Next outpatient HD run:  Tuesday, January 23rd, 2018 at 6:15AM.  Please arrive at 6:00AM on the first day to complete paperwork.

## 2018-01-22 ENCOUNTER — CARE COORDINATION (OUTPATIENT)
Dept: CARE COORDINATION | Facility: CLINIC | Age: 63
End: 2018-01-22

## 2018-01-22 ENCOUNTER — TELEPHONE (OUTPATIENT)
Dept: FAMILY MEDICINE | Facility: CLINIC | Age: 63
End: 2018-01-22

## 2018-01-22 LAB
BACTERIA SPEC CULT: ABNORMAL
Lab: ABNORMAL
SPECIMEN SOURCE: ABNORMAL

## 2018-01-22 NOTE — TELEPHONE ENCOUNTER
Please call for In Patient follow up  Chief Complaint: Sbp (Spontaneous Bacterial Peritonitis) (H), Ckd (Chronic Kidney Disease) Stage 5, Gfr Less Than 15 Ml/Min (H)

## 2018-01-23 DIAGNOSIS — I50.23 ACUTE ON CHRONIC SYSTOLIC HEART FAILURE (H): Primary | ICD-10-CM

## 2018-01-23 NOTE — TELEPHONE ENCOUNTER
"Hospital/TCU/ED for chronic condition Discharge Protocol    \"Hi, my name is Nieves NAJMA Shailesh, a registered nurse, and I am calling from [facility].  I am calling to follow up and see how things are going for you after your recent emergency visit/hospital/TCU stay.\"    Tell me how you are doing now that you are home?\" DOING OKAY. NEED A WORK NOTE .      Discharge Instructions    \"Let's review your discharge instructions.  What is/are the follow-up recommendations?  Pt. Response: SEE NEPHROLOGIST ON mONDAY 1/29/18.  Going to a new dialysis center and they did a mantoux test today 1/23/18.    \"Has an appointment with your primary care provider been scheduled?\"   No (schedule appointment)  Done scheduled for this Friday 1/26/18.    \"When you see the provider, I would recommend that you bring your medications with you.\"    Medications    \"Tell me what changed about your medicines when you discharged?\"    Changes to chronic meds?    0-1    \"What questions do you have about your medications?\"    None     New diagnoses of heart failure, COPD, diabetes, or MI?    No                  Medication reconciliation completed? Yes  Was MTM referral placed (*Make sure to put transitions as reason for referral)?   No    Call Summary    \"What questions or concerns do you have about your recent visit and your follow-up care?\"     none    \"If you have questions or things don't continue to improve, we encourage you contact us through the main clinic number (give number).  Even if the clinic is not open, triage nurses are available 24/7 to help you.     We would like you to know that our clinic has extended hours (provide information).  We also have urgent care (provide details on closest location and hours/contact info)\"      \"Thank you for your time and take care!\"         "

## 2018-01-26 ENCOUNTER — OFFICE VISIT (OUTPATIENT)
Dept: FAMILY MEDICINE | Facility: CLINIC | Age: 63
End: 2018-01-26
Payer: COMMERCIAL

## 2018-01-26 VITALS
OXYGEN SATURATION: 99 % | BODY MASS INDEX: 26.35 KG/M2 | WEIGHT: 178.4 LBS | SYSTOLIC BLOOD PRESSURE: 113 MMHG | TEMPERATURE: 98.5 F | RESPIRATION RATE: 16 BRPM | DIASTOLIC BLOOD PRESSURE: 71 MMHG | HEART RATE: 120 BPM

## 2018-01-26 DIAGNOSIS — K65.0 ACUTE SUPPURATIVE PERITONITIS (H): Primary | ICD-10-CM

## 2018-01-26 DIAGNOSIS — M10.9 GOUT, UNSPECIFIED CAUSE, UNSPECIFIED CHRONICITY, UNSPECIFIED SITE: ICD-10-CM

## 2018-01-26 DIAGNOSIS — M1A.0390 CHRONIC GOUT OF WRIST, UNSPECIFIED CAUSE, UNSPECIFIED LATERALITY: ICD-10-CM

## 2018-01-26 DIAGNOSIS — I50.22 CHRONIC SYSTOLIC CONGESTIVE HEART FAILURE (H): ICD-10-CM

## 2018-01-26 LAB
ERYTHROCYTE [DISTWIDTH] IN BLOOD BY AUTOMATED COUNT: 18.4 % (ref 10–15)
HCT VFR BLD AUTO: 31.3 % (ref 40–53)
HGB BLD-MCNC: 9.9 G/DL (ref 13.3–17.7)
MCH RBC QN AUTO: 31.8 PG (ref 26.5–33)
MCHC RBC AUTO-ENTMCNC: 31.6 G/DL (ref 31.5–36.5)
MCV RBC AUTO: 101 FL (ref 78–100)
PLATELET # BLD AUTO: 410 10E9/L (ref 150–450)
RBC # BLD AUTO: 3.11 10E12/L (ref 4.4–5.9)
WBC # BLD AUTO: 10.7 10E9/L (ref 4–11)

## 2018-01-26 PROCEDURE — 82947 ASSAY GLUCOSE BLOOD QUANT: CPT | Performed by: FAMILY MEDICINE

## 2018-01-26 PROCEDURE — 84075 ASSAY ALKALINE PHOSPHATASE: CPT | Performed by: FAMILY MEDICINE

## 2018-01-26 PROCEDURE — 84550 ASSAY OF BLOOD/URIC ACID: CPT | Performed by: FAMILY MEDICINE

## 2018-01-26 PROCEDURE — 82247 BILIRUBIN TOTAL: CPT | Performed by: FAMILY MEDICINE

## 2018-01-26 PROCEDURE — 99214 OFFICE O/P EST MOD 30 MIN: CPT | Performed by: FAMILY MEDICINE

## 2018-01-26 PROCEDURE — 84460 ALANINE AMINO (ALT) (SGPT): CPT | Performed by: FAMILY MEDICINE

## 2018-01-26 PROCEDURE — 36415 COLL VENOUS BLD VENIPUNCTURE: CPT | Performed by: FAMILY MEDICINE

## 2018-01-26 PROCEDURE — 84450 TRANSFERASE (AST) (SGOT): CPT | Performed by: FAMILY MEDICINE

## 2018-01-26 PROCEDURE — 85027 COMPLETE CBC AUTOMATED: CPT | Performed by: FAMILY MEDICINE

## 2018-01-26 NOTE — MR AVS SNAPSHOT
After Visit Summary   1/26/2018    Murray Nicholson    MRN: [id number]           Patient Information     Date Of Birth          1955        Visit Information        Provider Department      1/26/2018 2:00 PM Yahir Turcios MD [facility]        Today's Diagnoses     Acute suppurative peritonitis (H)    -  1    Gout, unspecified cause, unspecified chronicity, unspecified site        Chronic gout of wrist, unspecified cause, unspecified laterality        Uncontrolled type 2 diabetes mellitus with chronic kidney disease on chronic dialysis, without long-term current use of insulin (H)        Chronic systolic congestive heart failure (H)           Follow-ups after your visit        Follow-up notes from your care team     Return in about 3 months (around 4/26/2018) for Routine Visit.      Your next 10 appointments already scheduled     Mar 16, 2018  9:30 AM CDT   (Arrive by 9:15 AM)   Return Visit with Darvin Tang MD   [facility] ([facility])    [address] [place] [address]  [address]  [facility] [address] [phone]              Who to contact     If you have questions or need follow up information about today's clinic visit or your schedule please contact [facility] directly at [phone].  Normal or non-critical lab and imaging results will be communicated to you by MyChart, letter or phone within 4 business days after the clinic has received the results. If you do not hear from us within 7 days, please contact the clinic through MyChart or phone. If you have a critical or abnormal lab result, we will notify you by phone as soon as possible.  Submit refill requests through [employer] or call your pharmacy and they will forward the refill request to us. Please allow 3 business days for your refill to be completed.          Additional Information About Your Visit        [employer] Information     [employer] gives you secure access  to your electronic health record. If you see a primary care provider, you can also send messages to your care team and make appointments. If you have questions, please call your primary care clinic.  If you do not have a primary care provider, please call [phone] and they will assist you.        Care EveryWhere ID     This is your Care EveryWhere ID. This could be used by other organizations to access your [place] medical records  [id number]        Your Vitals Were     Pulse Temperature Respirations Pulse Oximetry BMI (Body Mass Index)       120 98.5  F (36.9  C) (Oral) 16 99% 26.35 kg/m2        Blood Pressure from Last 3 Encounters:   01/26/18 113/71   01/20/18 116/67   12/15/17 110/65    Weight from Last 3 Encounters:   01/26/18 178 lb 6.4 oz (80.9 kg)   01/20/18 176 lb 14.4 oz (80.2 kg)   12/15/17 199 lb (90.3 kg)              We Performed the Following     Alkaline phosphatase     ALT     AST     Bilirubin  total     CBC with platelets     Glucose     Uric acid          Today's Medication Changes          These changes are accurate as of 1/26/18 11:59 PM.  If you have any questions, ask your nurse or doctor.               These medicines have changed or have updated prescriptions.        Dose/Directions    fluticasone 50 MCG/ACT spray   Commonly known as:  FLONASE   This may have changed:  how much to take   Used for:  Nasal congestion        Dose:  1-2 spray   Spray 1-2 sprays into both nostrils daily   Quantity:  16 g   Refills:  11       spironolactone 100 MG tablet   Commonly known as:  ALDACTONE   This may have changed:  how much to take   Used for:  Hypokalemia, Chronic systolic congestive heart failure (H)        Dose:  50 mg   Take 0.5 tablets (50 mg) by mouth daily   Quantity:  30 tablet   Refills:  11                Primary Care Provider Office Phone # Fax #    Ana Luisa Mcpherson [phone]        [facility] [address]  [facility] [address]        Equal Access to Services      [facility]: Hadii aad ku mary Sobriandaali, waaxda luqadaha, qaybta kaalmada adegale, jose heshamin hayaaalexa richmonddorothy way talha . So [facility] [phone].    ATENCIÓN: Si radhala shane, tiene a ortiz disposición servicios gratuitos de asistencia lingüística. Vicame al [phone].    We comply with applicable federal civil rights laws and [place] laws. We do not discriminate on the basis of race, color, national origin, age, disability, sex, sexual orientation, or gender identity.            Thank you!     Thank you for choosing [facility]  for your care. Our goal is always to provide you with excellent care. Hearing back from our patients is one way we can continue to improve our services. Please take a few minutes to complete the written survey that you may receive in the mail after your visit with us. Thank you!             Your Updated Medication List - Protect others around you: Learn how to safely use, store and throw away your medicines at www.disposemymeds.org.          This list is accurate as of 1/26/18 11:59 PM.  Always use your most recent med list.                   Brand Name Dispense Instructions for use Diagnosis    ACETAMINOPHEN PO      Take 500-1,000 mg by mouth nightly as needed for pain        albuterol 108 (90 BASE) MCG/ACT Inhaler    PROAIR HFA/PROVENTIL HFA/VENTOLIN HFA    1 Inhaler    Inhale 2 puffs into the lungs every 6 hours as needed for shortness of breath / dyspnea or wheezing    Cough       * allopurinol 100 MG tablet    ZYLOPRIM    30 tablet    Take 1 tablet (100 mg) by mouth daily Take with 300 mg tabs for 400 mg /day total.    Chronic gout of wrist, unspecified cause, unspecified laterality, Gout, unspecified cause, unspecified chronicity, unspecified site       * allopurinol 300 MG tablet    ZYLOPRIM    30 tablet    Take 1 tablet (300 mg) by mouth daily Take with 100 mg tabs for 400 mg /day total.    Chronic gout of wrist, unspecified cause, unspecified  laterality, Gout, unspecified cause, unspecified chronicity, unspecified site       amLODIPine 5 MG tablet    NORVASC    90 tablet    Take 1 tablet (5 mg) by mouth daily    Hypertension goal BP (blood pressure) < 130/80       aspirin 81 MG tablet      Take 1 tablet (81 mg) by mouth at bedtime        atorvastatin 40 MG tablet    LIPITOR    90 tablet    Take 1 tablet (40 mg) by mouth daily    CKD (chronic kidney disease) stage 3, GFR 30-59 ml/min, Hyperlipidemia LDL goal <100       B-COMPLEX PO      Take 1 tablet by mouth daily        bismuth subsalicylate 262 MG/15ML suspension    PEPTO BISMOL     Take 15 mLs by mouth every 6 hours as needed for indigestion        calcitRIOL 0.25 MCG capsule    ROCALTROL    90 capsule    Take 1 capsule (0.25 mcg) by mouth daily    Hypocalcemia       calcium acetate (Phos Binder) 667 MG Tabs     180 tablet    Take 1 tablet by mouth 3 times daily (with meals)    Hyperphosphatemia       fluconazole 200 MG tablet    DIFLUCAN    14 tablet    Take 2 tablets (400 mg) by mouth daily for 7 days    Peritonitis (H)       fluticasone 50 MCG/ACT spray    FLONASE    16 g    Spray 1-2 sprays into both nostrils daily    Nasal congestion       gentamicin 0.1 % cream    GARAMYCIN     Apply topically daily as needed        hydrALAZINE 50 MG tablet    APRESOLINE    270 tablet    Take 1 tablet (50 mg) by mouth 3 times daily    Type 2 diabetes mellitus with stage 5 chronic kidney disease not on chronic dialysis, without long-term current use of insulin (H)       isosorbide mononitrate 60 MG 24 hr tablet    IMDUR    90 tablet    Take 1 tablet (60 mg) by mouth daily    Chronic systolic congestive heart failure (H)       loratadine 10 MG tablet    CLARITIN     Take 10 mg by mouth daily as needed Reported on 5/3/2017    Hypertensive cardiopathy, SOB (shortness of breath)       losartan 100 MG tablet    COZAAR    90 tablet    TAKE 1 TABLET BY MOUTH DAILY    Renal hypertension, stage 1-4 or unspecified chronic  kidney disease       metoprolol succinate 100 MG 24 hr tablet    TOPROL XL    90 tablet    Take 1 tablet (100 mg) by mouth daily    Chronic systolic congestive heart failure (H)       metroNIDAZOLE 500 MG tablet    FLAGYL    28 tablet    Take 1 tablet (500 mg) by mouth every 6 hours for 7 days    Peritonitis (H)       omeprazole 20 MG CR capsule    priLOSEC     Take 20 mg by mouth daily At night        order for DME     1 Units    Equipment being ordered: Commode () Treatment Diagnosis: ESRD on PD Pt has to be connected to PD all night and can not be disconnected, hence impending his mobility to go to the bathroom. At risk for infection if he does not have this equipment.    CKD (chronic kidney disease) stage 5, GFR less than 15 ml/min (H)       * predniSONE 5 MG tablet    DELTASONE    90 tablet    Take 1 tablet (5 mg) by mouth daily    Gout, unspecified cause, unspecified chronicity, unspecified site, Chronic gout of wrist, unspecified cause, unspecified laterality       * predniSONE 20 MG tablet    DELTASONE    30 tablet    Take 2 tablets (40 mg) by mouth daily for 3 days for gout flares    Gout, unspecified cause, unspecified chronicity, unspecified site       spironolactone 100 MG tablet    ALDACTONE    30 tablet    Take 0.5 tablets (50 mg) by mouth daily    Hypokalemia, Chronic systolic congestive heart failure (H)       torsemide 100 MG tablet    DEMADEX    60 tablet    Take 1 tablet (100 mg) by mouth 2 times daily    Other hypervolemia       traMADol 50 MG tablet    ULTRAM    50 tablet    Take 1 tablet (50 mg) by mouth every 6 hours as needed for moderate pain    Abdominal pain, generalized       * Notice:  This list has 4 medication(s) that are the same as other medications prescribed for you. Read the directions carefully, and ask your doctor or other care provider to review them with you.

## 2018-01-26 NOTE — NURSING NOTE
"Chief Complaint   Patient presents with     Hospital F/U       Initial /71  Pulse 120  Temp 98.5  F (36.9  C) (Oral)  Resp 16  Wt 178 lb 6.4 oz (80.9 kg)  SpO2 99%  BMI 26.35 kg/m2 Estimated body mass index is 26.35 kg/(m^2) as calculated from the following:    Height as of 12/15/17: 5' 9\" (1.753 m).    Weight as of this encounter: 178 lb 6.4 oz (80.9 kg).  Medication Reconciliation: complete     Ruben Benavidez MA    "

## 2018-01-26 NOTE — Clinical Note
It looks like Murray's infection is resolving. His weight and bp was down, he was tachy and felt a bit more shortness of breath. I will make sure he gets into cardiology. Any objection if I have him decrease his torsemide or hold his norvasc. I think you are to see him Monday.  Yahir Turcios MD

## 2018-01-26 NOTE — PROGRESS NOTES
SUBJECTIVE:   Murray Nicholson is a 63 year old male who presents to clinic today for the following health issues:          Hospital Follow-up Visit:    Hospital/Nursing Home/IP Rehab Facility: [facility]  Date of Admission: 1/7/18  Date of Discharge: 1/20/18  Reason(s) for Admission: peritonitis            Problems taking medications regularly:  None       Medication changes since discharge: fluconazole and metronidazole for 7 days more after discharge  Antimicrobials:    -ceftazidime  12/29- 1/19    -metronidazole current    -vancomycin -> d/c 1/16 briefly added back 1/19 for staph epi and elevated WBC however will d/c per ID note    -micafungin - awaiting sensitivities    -gentamycin -> d/c 1/17       Problems adhering to non-medication therapy:  Pt has concern about direction on the metronidazole.    Summary of hospitalization:  [facility] discharge summary reviewed  Diagnostic Tests/Treatments reviewed.  Follow up needed: none specified  Other Healthcare Providers Involved in Patient s Care:         Specialist appointment - nephroligy/dialysis, cardiology  Update since discharge: improved.       Post Discharge Medication Reconciliation: discharge medications reconciled, continue medications without change.  Plan of care communicated with patient     Coding guidelines for this visit:  Type of Medical   Decision Making Face-to-Face Visit       within 7 Days of discharge Face-to-Face Visit        within 14 days of discharge   Moderate Complexity [id number]   High Complexity [id number]          1: peritonitis-he took the metronidazole 3 times daily rather than 4 times daily. Overall, he is not having fever, the pain is gone. He is tolerating the antibiotics well.     2: esrd-on hemodialysis-tolerating this well but feels weak. Has lost some weight below his typical dry weight of 180. Now 173. His blood pressure has been lower.     3: CHF. Some increase in shortness of breath over his  baseline. No cardiology follow up. no swelling in his legs. No chest pain.     OBJECTIVE: /71  Pulse 120  Temp 98.5  F (36.9  C) (Oral)  Resp 16  Wt 178 lb 6.4 oz (80.9 kg)  SpO2 99%  BMI 26.35 kg/m2   L clear   cv tachy but regular rhythm.   abd s/nt. No sign infection over surgery site.   There is a very small superfical sore about 3x4mm in the r groin fold. He noted this was more like a cyst/pimple a few days ago but seems to have gone down. No drainage.   Legs without edema.   There is some onchymycosis of his great toenail.     Results for orders placed or performed in visit on 01/26/18   CBC with platelets   Result Value Ref Range    WBC 10.7 4.0 - 11.0 10e9/L    RBC Count 3.11 (L) 4.4 - 5.9 10e12/L    Hemoglobin 9.9 (L) 13.3 - 17.7 g/dL    Hematocrit 31.3 (L) 40.0 - 53.0 %     (H) 78 - 100 fl    MCH 31.8 26.5 - 33.0 pg    MCHC 31.6 31.5 - 36.5 g/dL    RDW 18.4 (H) 10.0 - 15.0 %    Platelet Count 410 150 - 450 10e9/L        ICD-10-CM    1. Acute suppurative peritonitis (H) K65.0 CBC with platelets   2. Gout, unspecified cause, unspecified chronicity, unspecified site M10.9 Uric acid     ALT     AST     Bilirubin  total     Alkaline phosphatase   3. Chronic gout of wrist, unspecified cause, unspecified laterality M1A.0390 Uric acid     ALT     AST     Bilirubin  total     Alkaline phosphatase   4. Uncontrolled type 2 diabetes mellitus with chronic kidney disease on chronic dialysis, without long-term current use of insulin (H) E11.22 Glucose    E11.65     N18.6     Z99.2    5. Chronic systolic congestive heart failure (H) I50.22     will contact dr rao about weight loss and lower blood pressure. He will call to set up follow up with the [facility]iology. His peritonitis seems to be resolving. He will finish out the metronidazole at 4 times daily. His a1c was elevated. Unclear if this is related all to the infection. He may benefit from a long acting insulin.

## 2018-01-26 NOTE — LETTER
Patient:  Murray Nicholson  :   1955  MRN:     [id number]        Mr.Emil Nicholson  [address] [place] [address]        2018    Dear ,    We are writing to inform you of your test results.      Resulted Orders   Uric acid   Result Value Ref Range    Uric Acid 3.5 3.5 - 7.2 mg/dL   ALT   Result Value Ref Range    ALT 16 0 - 70 U/L   AST   Result Value Ref Range    AST 23 0 - 45 U/L   Bilirubin  total   Result Value Ref Range    Bilirubin Total 0.4 0.2 - 1.3 mg/dL   Alkaline phosphatase   Result Value Ref Range    Alkaline Phosphatase 95 40 - 150 U/L         [id number]  1955

## 2018-01-27 LAB
ALP SERPL-CCNC: 95 U/L (ref 40–150)
ALT SERPL W P-5'-P-CCNC: 16 U/L (ref 0–70)
AST SERPL W P-5'-P-CCNC: 23 U/L (ref 0–45)
BILIRUB SERPL-MCNC: 0.4 MG/DL (ref 0.2–1.3)
GLUCOSE SERPL-MCNC: 183 MG/DL (ref 70–99)
URATE SERPL-MCNC: 3.5 MG/DL (ref 3.5–7.2)

## 2018-01-29 DIAGNOSIS — I12.9 RENAL HYPERTENSION, STAGE 1-4 OR UNSPECIFIED CHRONIC KIDNEY DISEASE: ICD-10-CM

## 2018-01-29 DIAGNOSIS — R06.00 PAROXYSMAL NOCTURNAL DYSPNEA: Primary | ICD-10-CM

## 2018-01-29 DIAGNOSIS — I50.22 CHRONIC SYSTOLIC CONGESTIVE HEART FAILURE (H): ICD-10-CM

## 2018-01-29 RX ORDER — LOSARTAN POTASSIUM 25 MG/1
25 TABLET ORAL AT BEDTIME
Qty: 90 TABLET | Refills: 3 | Status: SHIPPED | OUTPATIENT
Start: 2018-01-29 | End: 2018-02-02

## 2018-01-29 RX ORDER — METOPROLOL SUCCINATE 25 MG/1
25 TABLET, EXTENDED RELEASE ORAL DAILY
Qty: 90 TABLET | Refills: 3 | Status: ON HOLD | OUTPATIENT
Start: 2018-01-29 | End: 2018-02-14

## 2018-01-31 ENCOUNTER — MYC MEDICAL ADVICE (OUTPATIENT)
Dept: CARDIOLOGY | Facility: CLINIC | Age: 63
End: 2018-01-31

## 2018-02-01 ENCOUNTER — OFFICE VISIT (OUTPATIENT)
Dept: CARDIOLOGY | Facility: CLINIC | Age: 63
End: 2018-02-01
Attending: NURSE PRACTITIONER
Payer: COMMERCIAL

## 2018-02-01 VITALS
HEART RATE: 126 BPM | SYSTOLIC BLOOD PRESSURE: 97 MMHG | OXYGEN SATURATION: 100 % | WEIGHT: 176 LBS | DIASTOLIC BLOOD PRESSURE: 65 MMHG | HEIGHT: 69 IN | BODY MASS INDEX: 26.07 KG/M2

## 2018-02-01 DIAGNOSIS — I71.21 ASCENDING AORTIC ANEURYSM (H): ICD-10-CM

## 2018-02-01 DIAGNOSIS — E78.5 HYPERLIPIDEMIA LDL GOAL <100: ICD-10-CM

## 2018-02-01 DIAGNOSIS — I25.10 CORONARY ARTERY DISEASE INVOLVING NATIVE CORONARY ARTERY OF NATIVE HEART WITHOUT ANGINA PECTORIS: ICD-10-CM

## 2018-02-01 DIAGNOSIS — Q23.81 BICUSPID AORTIC VALVE: ICD-10-CM

## 2018-02-01 DIAGNOSIS — I25.5 ISCHEMIC CARDIOMYOPATHY: ICD-10-CM

## 2018-02-01 DIAGNOSIS — I50.22 CHRONIC SYSTOLIC HEART FAILURE (H): Primary | ICD-10-CM

## 2018-02-01 PROCEDURE — 99215 OFFICE O/P EST HI 40 MIN: CPT | Mod: ZP | Performed by: NURSE PRACTITIONER

## 2018-02-01 PROCEDURE — G0463 HOSPITAL OUTPT CLINIC VISIT: HCPCS | Mod: ZF

## 2018-02-01 RX ORDER — SODIUM CHLORIDE 9 MG/ML
INJECTION, SOLUTION INTRAVENOUS CONTINUOUS
Status: CANCELLED | OUTPATIENT
Start: 2018-02-01

## 2018-02-01 RX ORDER — LIDOCAINE 40 MG/G
CREAM TOPICAL
Status: CANCELLED | OUTPATIENT
Start: 2018-02-01

## 2018-02-01 ASSESSMENT — PAIN SCALES - GENERAL: PAINLEVEL: NO PAIN (0)

## 2018-02-01 NOTE — PATIENT INSTRUCTIONS
Patient Instructions:    It was a pleasure to see you in the cardiology clinic today.    If you have any questions you can reach our nurse triage line at [phone].  Press Option #1 for the [facility], then press Option #3 for nursing or Option #1 for scheduling. We also encourage the use of AddShoppers to communicate with your HealthCare Provider    Note new medications: None.  Stop the following medications: None.    The results from today include: PLEASE TAKE YOUR MEDICATIONS AS DIRECTED BY DR. MEDINA. PLEASE SCHEDULE AN ECHOCARDIOGRAM PRIOR TO LEAVING CLINIC TODAY. WE WILL CALL YOU TOMORROW TO ARRANGE FOR THE CORONARY ANGIOGRAM.   Please follow up with [facility] within next month. Please follow-up with Dr. Posada or interventional cardiology pending results of the heart catheterization and echocardiogram.    Control your risk of coronary artery disease with these four lifestyle changes:  - Eating a heart healthy diet by following the American Heart Association Recommendations: Reduce saturated fat and trans fat to 5-6 percent of daily calories and minimizing the amount of trans fat you eat by limiting your intake of red meat and dairy products made with whole milk. It also means choosing skim milk, low-fat or fat-free dairy products, limiting fried food, and cooking with healthy oils such as vegetable oil. A healthy diet should include emphasis on fruits, vegetables, whole grains, poultry, fish and nuts, and limiting sugary foods and beverages. We recommend following the DASH (Dietary Approaches to Stop Hypertension) or Mediterranean Diet.  - Regular Exercise: Just 40 minutes of aerobic exercise of moderate to vigorous intensity done 3-4 times per week is enough to lower both cholesterol and high blood pressure. Brisk walking, swimming, bicycling or a dance class are examples.  - Avoiding Tobacco Smoking: Smoking compounds the risk from other risk factors for heart disease  including high cholesterol, high blood pressure, and diabetes. Smokers can lower cholesterol, blood pressure, and protect their arteries by quitting. Ask to learn more about quitting smoking.  - Losing Weight: Being overweight or obese raises your risk of high cholesterol, high blood pressure, and diabetes which are all risk factors for heart disease. Losing excess weight can improve cholesterol levels, blood pressure, and reduce incidence of diabetes and potentially reverse these disease processes.     Get help if you experience any of these heart attack warning signs: Although some heart attacks are sudden and intense, most start slowly, with mild pain or discomfort. Pay attention to your body -- and call 911 if you feel:  - Chest discomfort: Most heart attacks involve discomfort in the center of the chest that lasts more than a few minutes, or that goes away and comes back. It can feel like uncomfortable pressure, squeezing, fullness or pain.  - Discomfort in other areas of the upper body: Symptoms can include pain or discomfort in one or both arms, the back, neck, jaw or stomach.   - Shortness of breath with or without chest discomfort   - Other signs may include breaking out in a cold sweat, nausea or lightheadedness      Sincerely,    Kristal MARTIN, FNP-BC
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
## 2018-02-01 NOTE — NURSING NOTE
Chief Complaint   Patient presents with     Follow Up For     f/u appt     Vitals were taken and medications were reconciled.  Morgan Madsen, JUDY  4:23 PM

## 2018-02-01 NOTE — MR AVS SNAPSHOT
After Visit Summary   2/1/2018    Murray Nicholson    MRN: [id number]           Patient Information     Date Of Birth          1955        Visit Information        Provider Department      2/1/2018 4:00 PM Kristal Sims, APRN CNP [facility]        Today's Diagnoses     Chronic systolic heart failure (H)    -  1    Ascending aortic aneurysm (H)        Coronary artery disease involving native coronary artery of native heart without angina pectoris        Hyperlipidemia LDL goal <100        Bicuspid aortic valve        Ischemic cardiomyopathy          Care Instructions    Patient Instructions:    It was a pleasure to see you in the cardiology clinic today.    If you have any questions you can reach our nurse triage line at [phone].  Press Option #1 for the [facility], then press Option #3 for nursing or Option #1 for scheduling. We also encourage the use of [employer] to communicate with your HealthCare Provider    Note new medications: None.  Stop the following medications: None.    The results from today include: PLEASE TAKE YOUR MEDICATIONS AS DIRECTED BY DR. MEDINA. PLEASE SCHEDULE AN ECHOCARDIOGRAM PRIOR TO LEAVING CLINIC TODAY. WE WILL CALL YOU TOMORROW TO ARRANGE FOR THE CORONARY ANGIOGRAM.   Please follow up with [facility] within next month. Please follow-up with Dr. Posada or interventional cardiology pending results of the heart catheterization and echocardiogram.    Control your risk of coronary artery disease with these four lifestyle changes:  - Eating a heart healthy diet by following the American Heart Association Recommendations: Reduce saturated fat and trans fat to 5-6 percent of daily calories and minimizing the amount of trans fat you eat by limiting your intake of red meat and dairy products made with whole milk. It also means choosing skim milk, low-fat or fat-free dairy products, limiting fried food, and cooking with healthy oils such as  vegetable oil. A healthy diet should include emphasis on fruits, vegetables, whole grains, poultry, fish and nuts, and limiting sugary foods and beverages. We recommend following the DASH (Dietary Approaches to Stop Hypertension) or Mediterranean Diet.  - Regular Exercise: Just 40 minutes of aerobic exercise of moderate to vigorous intensity done 3-4 times per week is enough to lower both cholesterol and high blood pressure. Brisk walking, swimming, bicycling or a dance class are examples.  - Avoiding Tobacco Smoking: Smoking compounds the risk from other risk factors for heart disease including high cholesterol, high blood pressure, and diabetes. Smokers can lower cholesterol, blood pressure, and protect their arteries by quitting. Ask to learn more about quitting smoking.  - Losing Weight: Being overweight or obese raises your risk of high cholesterol, high blood pressure, and diabetes which are all risk factors for heart disease. Losing excess weight can improve cholesterol levels, blood pressure, and reduce incidence of diabetes and potentially reverse these disease processes.     Get help if you experience any of these heart attack warning signs: Although some heart attacks are sudden and intense, most start slowly, with mild pain or discomfort. Pay attention to your body -- and call 911 if you feel:  - Chest discomfort: Most heart attacks involve discomfort in the center of the chest that lasts more than a few minutes, or that goes away and comes back. It can feel like uncomfortable pressure, squeezing, fullness or pain.  - Discomfort in other areas of the upper body: Symptoms can include pain or discomfort in one or both arms, the back, neck, jaw or stomach.   - Shortness of breath with or without chest discomfort   - Other signs may include breaking out in a cold sweat, nausea or lightheadedness      Sincerely,    NATALIA Andrade-BC                  Follow-ups after your visit        Additional Services      [facility] with [facility]                 Your next 10 appointments already scheduled     Feb 02, 2018  2:30 PM CST   Ech Complete with OBED    [facility] ([facility])    [address]  3rd Floor  [facility] [address] [phone]           1. Please bring or wear a comfortable two-piece outfit. 2. You may eat, drink and take your normal medicines. 3. For any questions that cannot be answered, please contact the ordering physician            Mar 07, 2018  7:45 AM CST   Lab with [facility]    [facility] ([facility])    [address]  1st Floor  [place] [address] [phone]            Mar 07, 2018  8:00 AM CST   (Arrive by 7:45 AM)   CORE RETURN with VERONICA Rocha CNP   [facility] ([facility])    [address] [place] [address]  [address]  [facility] [address] [phone]            Mar 16, 2018  9:30 AM CDT   (Arrive by 9:15 AM)   Return Visit with Darvin Tang MD   [facility] ([facility])    [address] [place] [address]  [address]  [facility] [address] [phone]              Future tests that were ordered for you today     Open Future Orders        Priority Expected Expires Ordered    Follow-Up with [facility] Routine 3/3/2018 5/2/2018 2/1/2018    Outpatient Coronary Angiography Adult Order Routine  4/22/2018 2/1/2018    Echocardiogram Routine  2/1/2019 2/1/2018            Who to contact     If you have questions or need follow up information about today's clinic visit or your schedule please contact [facility] directly at [phone].  Normal or non-critical lab and imaging results will be communicated to you by MyChart, letter or phone within 4 business days after the clinic has received the results. If you do not hear from us within 7 days, please contact the clinic through MyChart or phone. If you have a critical or abnormal lab  "result, we will notify you by phone as soon as possible.  Submit refill requests through [employer] or call your pharmacy and they will forward the refill request to us. Please allow 3 business days for your refill to be completed.          Additional Information About Your Visit        [employer] Information     [employer] gives you secure access to your electronic health record. If you see a primary care provider, you can also send messages to your care team and make appointments. If you have questions, please call your primary care clinic.  If you do not have a primary care provider, please call [phone] and they will assist you.        Care EveryWhere ID     This is your Care EveryWhere ID. This could be used by other organizations to access your [facility] medical records  [id number]        Your Vitals Were     Pulse Height Pulse Oximetry BMI (Body Mass Index)          126 1.753 m (5' 9\") 100% 25.99 kg/m2         Blood Pressure from Last 3 Encounters:   02/01/18 97/65   01/26/18 113/71   01/20/18 116/67    Weight from Last 3 Encounters:   02/01/18 79.8 kg (176 lb)   01/26/18 80.9 kg (178 lb 6.4 oz)   01/20/18 80.2 kg (176 lb 14.4 oz)                 Today's Medication Changes          These changes are accurate as of 2/1/18  5:08 PM.  If you have any questions, ask your nurse or doctor.               These medicines have changed or have updated prescriptions.        Dose/Directions    fluticasone 50 MCG/ACT spray   Commonly known as:  FLONASE   This may have changed:  how much to take   Used for:  Nasal congestion        Dose:  1-2 spray   Spray 1-2 sprays into both nostrils daily   Quantity:  16 g   Refills:  11                Primary Care Provider Office Phone # Fax #    Ana Luisa Margarita Mcpherson [phone]       [address] [address]  [facility] [address]        Equal Access to Services     JOHN HAUSER AH: Aubrie Thomas, jose villa, qaybjose kent " gavino low'aan ah. So [facility] [phone].    ATENCIÓN: Si odell lynn, tiene a ortiz disposición servicios gratuitos de asistencia lingüística. Keely al [phone].    We comply with applicable federal civil rights laws and [place] laws. We do not discriminate on the basis of race, color, national origin, age, disability, sex, sexual orientation, or gender identity.            Thank you!     Thank you for choosing [facility]  for your care. Our goal is always to provide you with excellent care. Hearing back from our patients is one way we can continue to improve our services. Please take a few minutes to complete the written survey that you may receive in the mail after your visit with us. Thank you!             Your Updated Medication List - Protect others around you: Learn how to safely use, store and throw away your medicines at www.disposemymeds.org.          This list is accurate as of 2/1/18  5:08 PM.  Always use your most recent med list.                   Brand Name Dispense Instructions for use Diagnosis    ACETAMINOPHEN PO      Take 500-1,000 mg by mouth nightly as needed for pain        albuterol 108 (90 BASE) MCG/ACT Inhaler    PROAIR HFA/PROVENTIL HFA/VENTOLIN HFA    1 Inhaler    Inhale 2 puffs into the lungs every 6 hours as needed for shortness of breath / dyspnea or wheezing    Cough       * allopurinol 100 MG tablet    ZYLOPRIM    30 tablet    Take 1 tablet (100 mg) by mouth daily Take with 300 mg tabs for 400 mg /day total.    Chronic gout of wrist, unspecified cause, unspecified laterality, Gout, unspecified cause, unspecified chronicity, unspecified site       * allopurinol 300 MG tablet    ZYLOPRIM    30 tablet    Take 1 tablet (300 mg) by mouth daily Take with 100 mg tabs for 400 mg /day total.    Chronic gout of wrist, unspecified cause, unspecified laterality, Gout, unspecified cause, unspecified chronicity, unspecified site       aspirin 81 MG tablet      Take 1 tablet (81  mg) by mouth at bedtime        atorvastatin 40 MG tablet    LIPITOR    90 tablet    Take 1 tablet (40 mg) by mouth daily    CKD (chronic kidney disease) stage 3, GFR 30-59 ml/min, Hyperlipidemia LDL goal <100       B-COMPLEX PO      Take 1 tablet by mouth daily        bismuth subsalicylate 262 MG/15ML suspension    PEPTO BISMOL     Take 15 mLs by mouth every 6 hours as needed for indigestion        calcium acetate (Phos Binder) 667 MG Tabs     180 tablet    Take 1 tablet by mouth 3 times daily (with meals)    Hyperphosphatemia       fluticasone 50 MCG/ACT spray    FLONASE    16 g    Spray 1-2 sprays into both nostrils daily    Nasal congestion       gentamicin 0.1 % cream    GARAMYCIN     Apply topically daily as needed        isosorbide mononitrate 60 MG 24 hr tablet    IMDUR    90 tablet    Take 1 tablet (60 mg) by mouth daily    Chronic systolic congestive heart failure (H)       loratadine 10 MG tablet    CLARITIN     Take 10 mg by mouth daily as needed Reported on 5/3/2017    Hypertensive cardiopathy, SOB (shortness of breath)       losartan 25 MG tablet    COZAAR    90 tablet    Take 1 tablet (25 mg) by mouth At Bedtime    Renal hypertension, stage 1-4 or unspecified chronic kidney disease       metoprolol succinate 25 MG 24 hr tablet    TOPROL XL    90 tablet    Take 1 tablet (25 mg) by mouth daily    Chronic systolic congestive heart failure (H)       omeprazole 20 MG CR capsule    priLOSEC     Take 20 mg by mouth daily At night        order for DME     1 Units    Equipment being ordered: Commode () Treatment Diagnosis: ESRD on PD Pt has to be connected to PD all night and can not be disconnected, hence impending his mobility to go to the bathroom. At risk for infection if he does not have this equipment.    CKD (chronic kidney disease) stage 5, GFR less than 15 ml/min (H)       * predniSONE 5 MG tablet    DELTASONE    90 tablet    Take 1 tablet (5 mg) by mouth daily    Gout, unspecified cause,  unspecified chronicity, unspecified site, Chronic gout of wrist, unspecified cause, unspecified laterality       * predniSONE 20 MG tablet    DELTASONE    30 tablet    Take 2 tablets (40 mg) by mouth daily for 3 days for gout flares    Gout, unspecified cause, unspecified chronicity, unspecified site       torsemide 100 MG tablet    DEMADEX    60 tablet    Take 1 tablet (100 mg) by mouth 2 times daily    Other hypervolemia       traMADol 50 MG tablet    ULTRAM    50 tablet    Take 1 tablet (50 mg) by mouth every 6 hours as needed for moderate pain    Abdominal pain, generalized       * Notice:  This list has 4 medication(s) that are the same as other medications prescribed for you. Read the directions carefully, and ask your doctor or other care provider to review them with you.

## 2018-02-02 ENCOUNTER — HOSPITAL ENCOUNTER (INPATIENT)
Facility: CLINIC | Age: 63
LOS: 12 days | Discharge: HOME | DRG: 286 | End: 2018-02-14
Attending: EMERGENCY MEDICINE | Admitting: INTERNAL MEDICINE
Payer: COMMERCIAL

## 2018-02-02 ENCOUNTER — APPOINTMENT (OUTPATIENT)
Dept: GENERAL RADIOLOGY | Facility: CLINIC | Age: 63
DRG: 286 | End: 2018-02-02
Attending: EMERGENCY MEDICINE
Payer: COMMERCIAL

## 2018-02-02 ENCOUNTER — RADIANT APPOINTMENT (OUTPATIENT)
Dept: CARDIOLOGY | Facility: CLINIC | Age: 63
End: 2018-02-02
Payer: COMMERCIAL

## 2018-02-02 DIAGNOSIS — I71.21 ASCENDING AORTIC ANEURYSM (H): ICD-10-CM

## 2018-02-02 DIAGNOSIS — R06.09 DOE (DYSPNEA ON EXERTION): ICD-10-CM

## 2018-02-02 DIAGNOSIS — E78.5 HYPERLIPIDEMIA LDL GOAL <100: ICD-10-CM

## 2018-02-02 DIAGNOSIS — I42.9 FAMILIAL CARDIOMYOPATHY (H): ICD-10-CM

## 2018-02-02 DIAGNOSIS — I50.22 CHRONIC SYSTOLIC HEART FAILURE (H): ICD-10-CM

## 2018-02-02 DIAGNOSIS — Q23.81 BICUSPID AORTIC VALVE: ICD-10-CM

## 2018-02-02 DIAGNOSIS — I25.10 CORONARY ARTERY DISEASE INVOLVING NATIVE CORONARY ARTERY OF NATIVE HEART WITHOUT ANGINA PECTORIS: ICD-10-CM

## 2018-02-02 DIAGNOSIS — I50.23 ACUTE ON CHRONIC SYSTOLIC HEART FAILURE (H): ICD-10-CM

## 2018-02-02 PROBLEM — I50.9 HEART FAILURE (H): Status: ACTIVE | Noted: 2018-02-02

## 2018-02-02 LAB
ALBUMIN SERPL-MCNC: 2.1 G/DL (ref 3.4–5)
ALP SERPL-CCNC: 86 U/L (ref 40–150)
ALT SERPL W P-5'-P-CCNC: 16 U/L (ref 0–70)
ANION GAP SERPL CALCULATED.3IONS-SCNC: 13 MMOL/L (ref 3–14)
AST SERPL W P-5'-P-CCNC: 20 U/L (ref 0–45)
BASOPHILS # BLD AUTO: 0.1 10E9/L (ref 0–0.2)
BASOPHILS NFR BLD AUTO: 0.7 %
BILIRUB SERPL-MCNC: 0.4 MG/DL (ref 0.2–1.3)
BUN SERPL-MCNC: 30 MG/DL (ref 7–30)
CALCIUM SERPL-MCNC: 8.2 MG/DL (ref 8.5–10.1)
CHLORIDE SERPL-SCNC: 106 MMOL/L (ref 94–109)
CO2 BLDCOV-SCNC: 27 MMOL/L (ref 21–28)
CO2 SERPL-SCNC: 22 MMOL/L (ref 20–32)
CREAT SERPL-MCNC: 5.58 MG/DL (ref 0.66–1.25)
D DIMER PPP FEU-MCNC: 3.3 UG/ML FEU (ref 0–0.5)
DIFFERENTIAL METHOD BLD: ABNORMAL
EOSINOPHIL # BLD AUTO: 0.3 10E9/L (ref 0–0.7)
EOSINOPHIL NFR BLD AUTO: 4.3 %
ERYTHROCYTE [DISTWIDTH] IN BLOOD BY AUTOMATED COUNT: 19.7 % (ref 10–15)
GFR SERPL CREATININE-BSD FRML MDRD: 10 ML/MIN/1.7M2
GLUCOSE SERPL-MCNC: 240 MG/DL (ref 70–99)
HCT VFR BLD AUTO: 29.2 % (ref 40–53)
HGB BLD-MCNC: 9.1 G/DL (ref 13.3–17.7)
IMM GRANULOCYTES # BLD: 0.1 10E9/L (ref 0–0.4)
IMM GRANULOCYTES NFR BLD: 1.1 %
LACTATE BLD-SCNC: 1.9 MMOL/L (ref 0.7–2.1)
LYMPHOCYTES # BLD AUTO: 1 10E9/L (ref 0.8–5.3)
LYMPHOCYTES NFR BLD AUTO: 12.9 %
MCH RBC QN AUTO: 31.3 PG (ref 26.5–33)
MCHC RBC AUTO-ENTMCNC: 31.2 G/DL (ref 31.5–36.5)
MCV RBC AUTO: 100 FL (ref 78–100)
MONOCYTES # BLD AUTO: 0.4 10E9/L (ref 0–1.3)
MONOCYTES NFR BLD AUTO: 5 %
NEUTROPHILS # BLD AUTO: 5.6 10E9/L (ref 1.6–8.3)
NEUTROPHILS NFR BLD AUTO: 76 %
NRBC # BLD AUTO: 0.1 10*3/UL
NRBC BLD AUTO-RTO: 1 /100
NT-PROBNP SERPL-MCNC: ABNORMAL PG/ML (ref 0–900)
PCO2 BLDV: 35 MM HG (ref 40–50)
PH BLDV: 7.49 PH (ref 7.32–7.43)
PLATELET # BLD AUTO: 229 10E9/L (ref 150–450)
PLATELET # BLD AUTO: 263 10E9/L (ref 150–450)
PO2 BLDV: 32 MM HG (ref 25–47)
POTASSIUM SERPL-SCNC: 4.4 MMOL/L (ref 3.4–5.3)
PROT SERPL-MCNC: 6.2 G/DL (ref 6.8–8.8)
RBC # BLD AUTO: 2.91 10E12/L (ref 4.4–5.9)
SAO2 % BLDV FROM PO2: 68 %
SODIUM SERPL-SCNC: 141 MMOL/L (ref 133–144)
TROPONIN I SERPL-MCNC: 0.25 UG/L (ref 0–0.04)
WBC # BLD AUTO: 7.4 10E9/L (ref 4–11)

## 2018-02-02 PROCEDURE — 84484 ASSAY OF TROPONIN QUANT: CPT | Performed by: INTERNAL MEDICINE

## 2018-02-02 PROCEDURE — 83605 ASSAY OF LACTIC ACID: CPT

## 2018-02-02 PROCEDURE — 85049 AUTOMATED PLATELET COUNT: CPT | Performed by: EMERGENCY MEDICINE

## 2018-02-02 PROCEDURE — 36415 COLL VENOUS BLD VENIPUNCTURE: CPT | Performed by: EMERGENCY MEDICINE

## 2018-02-02 PROCEDURE — 93005 ELECTROCARDIOGRAM TRACING: CPT | Performed by: EMERGENCY MEDICINE

## 2018-02-02 PROCEDURE — 25000128 H RX IP 250 OP 636: Performed by: INTERNAL MEDICINE

## 2018-02-02 PROCEDURE — 80053 COMPREHEN METABOLIC PANEL: CPT | Performed by: EMERGENCY MEDICINE

## 2018-02-02 PROCEDURE — 25000132 ZZH RX MED GY IP 250 OP 250 PS 637: Performed by: INTERNAL MEDICINE

## 2018-02-02 PROCEDURE — 85025 COMPLETE CBC W/AUTO DIFF WBC: CPT | Performed by: INTERNAL MEDICINE

## 2018-02-02 PROCEDURE — 82803 BLOOD GASES ANY COMBINATION: CPT

## 2018-02-02 PROCEDURE — 83036 HEMOGLOBIN GLYCOSYLATED A1C: CPT | Performed by: INTERNAL MEDICINE

## 2018-02-02 PROCEDURE — 99285 EMERGENCY DEPT VISIT HI MDM: CPT | Mod: Z6 | Performed by: EMERGENCY MEDICINE

## 2018-02-02 PROCEDURE — 71046 X-RAY EXAM CHEST 2 VIEWS: CPT

## 2018-02-02 PROCEDURE — 83880 ASSAY OF NATRIURETIC PEPTIDE: CPT | Performed by: INTERNAL MEDICINE

## 2018-02-02 PROCEDURE — 80053 COMPREHEN METABOLIC PANEL: CPT | Performed by: INTERNAL MEDICINE

## 2018-02-02 PROCEDURE — 21400006 ZZH R&B CCU INTERMEDIATE UMMC

## 2018-02-02 PROCEDURE — 85379 FIBRIN DEGRADATION QUANT: CPT | Performed by: INTERNAL MEDICINE

## 2018-02-02 PROCEDURE — 99285 EMERGENCY DEPT VISIT HI MDM: CPT | Mod: 25 | Performed by: EMERGENCY MEDICINE

## 2018-02-02 RX ORDER — ACETAMINOPHEN 500 MG
500-1000 TABLET ORAL EVERY 8 HOURS PRN
Status: DISCONTINUED | OUTPATIENT
Start: 2018-02-02 | End: 2018-02-14 | Stop reason: HOSPADM

## 2018-02-02 RX ORDER — NALOXONE HYDROCHLORIDE 0.4 MG/ML
.1-.4 INJECTION, SOLUTION INTRAMUSCULAR; INTRAVENOUS; SUBCUTANEOUS
Status: DISCONTINUED | OUTPATIENT
Start: 2018-02-02 | End: 2018-02-09

## 2018-02-02 RX ORDER — ASPIRIN 81 MG/1
81 TABLET, CHEWABLE ORAL DAILY
Status: DISCONTINUED | OUTPATIENT
Start: 2018-02-03 | End: 2018-02-14 | Stop reason: HOSPADM

## 2018-02-02 RX ORDER — TRAMADOL HYDROCHLORIDE 50 MG/1
50 TABLET ORAL EVERY 6 HOURS PRN
Status: DISCONTINUED | OUTPATIENT
Start: 2018-02-02 | End: 2018-02-14 | Stop reason: HOSPADM

## 2018-02-02 RX ORDER — HEPARIN SODIUM 5000 [USP'U]/.5ML
5000 INJECTION, SOLUTION INTRAVENOUS; SUBCUTANEOUS EVERY 12 HOURS
Status: DISCONTINUED | OUTPATIENT
Start: 2018-02-02 | End: 2018-02-14 | Stop reason: HOSPADM

## 2018-02-02 RX ORDER — HYDRALAZINE HYDROCHLORIDE 10 MG/1
20 TABLET, FILM COATED ORAL EVERY 8 HOURS SCHEDULED
Status: DISCONTINUED | OUTPATIENT
Start: 2018-02-02 | End: 2018-02-03

## 2018-02-02 RX ORDER — ATORVASTATIN CALCIUM 40 MG/1
40 TABLET, FILM COATED ORAL DAILY
Status: DISCONTINUED | OUTPATIENT
Start: 2018-02-03 | End: 2018-02-14 | Stop reason: HOSPADM

## 2018-02-02 RX ORDER — PREDNISONE 5 MG/1
5 TABLET ORAL DAILY
Status: DISCONTINUED | OUTPATIENT
Start: 2018-02-03 | End: 2018-02-14 | Stop reason: HOSPADM

## 2018-02-02 RX ORDER — ALBUTEROL SULFATE 90 UG/1
2 AEROSOL, METERED RESPIRATORY (INHALATION) EVERY 6 HOURS PRN
Status: DISCONTINUED | OUTPATIENT
Start: 2018-02-02 | End: 2018-02-14 | Stop reason: HOSPADM

## 2018-02-02 RX ORDER — AMOXICILLIN 250 MG
2 CAPSULE ORAL 2 TIMES DAILY PRN
Status: DISCONTINUED | OUTPATIENT
Start: 2018-02-02 | End: 2018-02-14 | Stop reason: HOSPADM

## 2018-02-02 RX ORDER — AMOXICILLIN 250 MG
1 CAPSULE ORAL 2 TIMES DAILY PRN
Status: DISCONTINUED | OUTPATIENT
Start: 2018-02-02 | End: 2018-02-14 | Stop reason: HOSPADM

## 2018-02-02 RX ADMIN — OMEPRAZOLE 20 MG: 20 CAPSULE, DELAYED RELEASE ORAL at 23:02

## 2018-02-02 RX ADMIN — ACETAMINOPHEN 1000 MG: 500 TABLET, FILM COATED ORAL at 23:20

## 2018-02-02 RX ADMIN — HEPARIN SODIUM 5000 UNITS: 5000 INJECTION, SOLUTION INTRAVENOUS; SUBCUTANEOUS at 23:02

## 2018-02-02 RX ADMIN — Medication 6 ML: at 16:00

## 2018-02-02 RX ADMIN — TRAMADOL HYDROCHLORIDE 50 MG: 50 TABLET, COATED ORAL at 23:20

## 2018-02-02 RX ADMIN — HYDRALAZINE HYDROCHLORIDE 20 MG: 10 TABLET, FILM COATED ORAL at 23:02

## 2018-02-02 NOTE — IP AVS SNAPSHOT
MRN:[id number]                      After Visit Summary   2/2/2018    Murray Nicholson    MRN: [id number]           Thank you!     Thank you for choosing [facility] for your care. Our goal is always to provide you with excellent care. Hearing back from our patients is one way we can continue to improve our services. Please take a few minutes to complete the written survey that you may receive in the mail after you visit with us. Thank you!        Patient Information     Date Of Birth          1955        About your hospital stay     You were admitted on:  February 2, 2018 You last received care in the:  [address] [facility]    You were discharged on:  February 14, 2018        Reason for your hospital stay       You were hospitalized for fluid overload likely due to inadequate fluid removal during dialysis. This occurred in the setting of low blood pressures. Some of your previous home meds have been held due to low blood pressure. Continue on losartan as prescribed. Follow up with core clinic within 1 week. Follow up within 2-3 weeks with Dr Ernst for continued work-up and discussion of heart-kidney transplant.                  Who to Call     For medical emergencies, please call 911.  For non-urgent questions about your medical care, please call your primary care provider or clinic, [phone]          Attending Provider     Provider Specialty    Kwaku Tran MD Emergency Medicine    [facility] Clinical Cardiac Electrophysiology       Primary Care Provider Office Phone # Fax #    Ana Luisa Mcpherson [phone]       When to contact your care team       Call cardiology clinic if you have any of the following:  Severely increased shortness of breath, severe dizziness or falls, chest pain.                  After Care Instructions     Activity       Your activity upon discharge: activity as tolerated            Diet       Follow this diet upon discharge:  Orders Placed This Encounter      Fluid restriction 2000 ML FLUID      2 Gram Sodium Diet                  Follow-up Appointments     Adult [facility] Follow-up and recommended labs and tests       Follow up with Dr. Ernst , at [facility] cardiology clinic, within 2-3 weeks  to evaluate for heart-kidney transplant.    Appointments on [place] and/or [facility] (with [facility] or [facility] provider or service). Call [phone] if you haven't heard regarding these appointments within 7 days of discharge.            Follow Up and recommended labs and tests       Kunal Lange   Phone: [phone]   Fax: [phone]    For resumption of outpatient dialysis on T-Th-Sat at 6:15 AM.            Follow Up and recommended labs and tests       Follow up with cardiology core clinic within 1 week with CBC/BMP check                  Your next 10 appointments already scheduled     Mar 07, 2018  7:45 AM CST   Lab with  LAB   [facility] ([facility])    [address]  1st Floor  [place] [address] [phone]            Mar 07, 2018  8:00 AM CST   (Arrive by 7:45 AM)   CORE RETURN with VERONICA Rocha CNP   [facility] ([facility])    [address]  [address]  [place] [address] [phone]            Mar 16, 2018  9:30 AM CDT   (Arrive by 9:15 AM)   Return Visit with Darvin Tang MD   [facility] Rheumatology ([facility])    [address]  [address]  [place] [address] [phone]              Additional Services     CARDIAC REHAB REFERRAL       *This therapy referral will be sent to [facility] Cardiac/[facility]ulmonary Rehabilitation, [address] [place] [address]. Phone: [phone]    Please be aware that coverage of these services is subject to the terms and limitations of your health insurance plan.  Call member services at your health plan with any benefit  "or coverage questions.            Medication Therapy Management Referral       Reason for referral:  on more than 5 medications and managing chronic disease and on more than 10 medications and hospitalized or in the ED in the past 6 months     This service is designed to help you get the most from your medications.  A specially trained pharmacist will work closely with you and your doctors  to solve any problems related to your medications and to help you get the   best results from taking them.      The Medication Therapy Management staff will call you to schedule an appointment.            Occupational Therapy Referral       *This therapy referral will be filtered to a centralized scheduling office at [facility] and the patient will receive a call to schedule an appointment at a [place] location most convenient for them. *     [facility] provides Occupational Therapy evaluation and treatment and many specialty services across the [place] system.  If requesting a specialty program, please choose from the list below.    If you have not heard from the scheduling office within 2 business days, please call [phone] for all locations, with the exception of [place], please call [phone].     Treatment: Evaluation & Treatment  Special Instructions/Modalities: Home with OP CR Phase II    Please be aware that coverage of these services is subject to the terms and limitations of your health insurance plan.  Call member services at your health plan with any benefit or coverage questions.      **Note to Provider:  If you are referring outside of [place] for the therapy appointment, please list the name of the location in the \"special instructions\" above, print the referral and give to the patient to schedule the appointment.                  Further instructions from your care team       Discharge RN: Please fax discharge orders and nephrology dc summary [facility] (Fax: [phone])    Pending Results     No orders found from 1/31/2018 to 2/3/2018.            Statement of Approval     Ordered          02/14/18 1505  I have reviewed and agree with all the recommendations and orders detailed in this document.  EFFECTIVE NOW     Approved and electronically signed by:  Russel Smith MD             Admission Information     Date & Time Provider Department Dept. Phone    2/2/2018 Deandre Muñoz MD [address] [facility] [phone]      Your Vitals Were     Blood Pressure Pulse Temperature Respirations Height Weight    111/69 (BP Location: Left arm) 114 98.5  F (36.9  C) (Oral) 16 1.753 m (5' 9\") 74.3 kg (163 lb 11.2 oz)    Pulse Oximetry BMI (Body Mass Index)                100% 24.17 kg/m2          MyChart Information     [facility] gives you secure access to your electronic health record. If you see a primary care provider, you can also send messages to your care team and make appointments. If you have questions, please call your primary care clinic.  If you do not have a primary care provider, please call [phone] and they will assist you.        Care EveryWhere ID     This is your Care EveryWhere ID. This could be used by other organizations to access your [facility] medical records  [id number]        Equal Access to Services     JOHN HAUSER : Hadii marsha bonnero Sotracey, waaxda luqadaha, qaybta kaalmada adedorothyyada, jose ayala. So [facility] [phone].    ATENCIÓN: Si habla español, tiene a ortiz disposición servicios gratuitos de asistencia lingüística. Llame al [phone].    We comply with applicable federal civil rights laws and [place] laws. We do not discriminate on the basis of race, color, national origin, age, disability, sex, sexual orientation, or gender identity.               Review of your medicines      CONTINUE these medicines which may have CHANGED, or have new prescriptions. If we are uncertain of " the size of tablets/capsules you have at home, strength may be listed as something that might have changed.        Dose / Directions    fluticasone 50 MCG/ACT spray   Commonly known as:  FLONASE   This may have changed:  how much to take   Used for:  Nasal congestion        Dose:  1-2 spray   Spray 1-2 sprays into both nostrils daily   Quantity:  16 g   Refills:  11       predniSONE 5 MG tablet   Commonly known as:  DELTASONE   This may have changed:  Another medication with the same name was removed. Continue taking this medication, and follow the directions you see here.   Used for:  Gout, unspecified cause, unspecified chronicity, unspecified site, Chronic gout of wrist, unspecified cause, unspecified laterality        Dose:  5 mg   Take 1 tablet (5 mg) by mouth daily   Quantity:  90 tablet   Refills:  1         CONTINUE these medicines which have NOT CHANGED        Dose / Directions    ACETAMINOPHEN PO        Dose:  500-1000 mg   Take 500-1,000 mg by mouth nightly as needed for pain   Refills:  0       albuterol 108 (90 BASE) MCG/ACT Inhaler   Commonly known as:  PROAIR HFA/PROVENTIL HFA/VENTOLIN HFA   Used for:  Cough        Dose:  2 puff   Inhale 2 puffs into the lungs every 6 hours as needed for shortness of breath / dyspnea or wheezing   Quantity:  1 Inhaler   Refills:  0       * allopurinol 100 MG tablet   Commonly known as:  ZYLOPRIM   Used for:  Chronic gout of wrist, unspecified cause, unspecified laterality, Gout, unspecified cause, unspecified chronicity, unspecified site        Dose:  100 mg   Take 1 tablet (100 mg) by mouth daily Take with 300 mg tabs for 400 mg /day total.   Quantity:  30 tablet   Refills:  2       * allopurinol 300 MG tablet   Commonly known as:  ZYLOPRIM   Used for:  Chronic gout of wrist, unspecified cause, unspecified laterality, Gout, unspecified cause, unspecified chronicity, unspecified site        Dose:  300 mg   Take 1 tablet (300 mg) by mouth daily Take with 100 mg tabs  for 400 mg /day total.   Quantity:  30 tablet   Refills:  2       aspirin 81 MG tablet        Take 1 tablet (81 mg) by mouth at bedtime   Refills:  0       atorvastatin 40 MG tablet   Commonly known as:  LIPITOR   Used for:  CKD (chronic kidney disease) stage 3, GFR 30-59 ml/min, Hyperlipidemia LDL goal <100        Dose:  40 mg   Take 1 tablet (40 mg) by mouth daily   Quantity:  90 tablet   Refills:  3       B-COMPLEX PO        Dose:  1 tablet   Take 1 tablet by mouth daily   Refills:  0       bismuth subsalicylate 262 MG/15ML suspension   Commonly known as:  PEPTO BISMOL        Dose:  15 mL   Take 15 mLs by mouth every 6 hours as needed for indigestion   Refills:  0       calcium acetate (Phos Binder) 667 MG Tabs   Used for:  Hyperphosphatemia        Dose:  1 tablet   Take 1 tablet by mouth 3 times daily (with meals)   Quantity:  180 tablet   Refills:  3       loratadine 10 MG tablet   Commonly known as:  CLARITIN   Used for:  Hypertensive cardiopathy, SOB (shortness of breath)        Dose:  10 mg   Take 10 mg by mouth daily as needed Reported on 5/3/2017   Refills:  0       LOSARTAN POTASSIUM PO        Dose:  25 mg   Take 25 mg by mouth daily   Refills:  0       omeprazole 20 MG CR capsule   Commonly known as:  priLOSEC        Dose:  20 mg   Take 20 mg by mouth daily At night   Refills:  0       order for DME   Used for:  CKD (chronic kidney disease) stage 5, GFR less than 15 ml/min (H)        Equipment being ordered: Commode () Treatment Diagnosis: ESRD on PD Pt has to be connected to PD all night and can not be disconnected, hence impending his mobility to go to the bathroom. At risk for infection if he does not have this equipment.   Quantity:  1 Units   Refills:  0       traMADol 50 MG tablet   Commonly known as:  ULTRAM   Used for:  Abdominal pain, generalized        Dose:  50 mg   Take 1 tablet (50 mg) by mouth every 6 hours as needed for moderate pain   Quantity:  50 tablet   Refills:  0       *  Notice:  This list has 2 medication(s) that are the same as other medications prescribed for you. Read the directions carefully, and ask your doctor or other care provider to review them with you.      STOP taking     isosorbide mononitrate 60 MG 24 hr tablet   Commonly known as:  IMDUR           metoprolol succinate 25 MG 24 hr tablet   Commonly known as:  TOPROL XL           torsemide 100 MG tablet   Commonly known as:  DEMADEX                    Protect others around you: Learn how to safely use, store and throw away your medicines at www.disposemymeds.org.             Medication List: This is a list of all your medications and when to take them. Check marks below indicate your daily home schedule. Keep this list as a reference.      Medications           Morning Afternoon Evening Bedtime As Needed    ACETAMINOPHEN PO   Take 500-1,000 mg by mouth nightly as needed for pain   Last time this was given:  1,000 mg on 2/9/2018 12:50 AM                                albuterol 108 (90 BASE) MCG/ACT Inhaler   Commonly known as:  PROAIR HFA/PROVENTIL HFA/VENTOLIN HFA   Inhale 2 puffs into the lungs every 6 hours as needed for shortness of breath / dyspnea or wheezing   Last time this was given:  2 puffs on 2/4/2018  8:50 AM                                * allopurinol 100 MG tablet   Commonly known as:  ZYLOPRIM   Take 1 tablet (100 mg) by mouth daily Take with 300 mg tabs for 400 mg /day total.   Last time this was given:  400 mg on 2/14/2018  8:51 AM                                * allopurinol 300 MG tablet   Commonly known as:  ZYLOPRIM   Take 1 tablet (300 mg) by mouth daily Take with 100 mg tabs for 400 mg /day total.   Last time this was given:  400 mg on 2/14/2018  8:51 AM                                aspirin 81 MG tablet   Take 1 tablet (81 mg) by mouth at bedtime                                atorvastatin 40 MG tablet   Commonly known as:  LIPITOR   Take 1 tablet (40 mg) by mouth daily   Last time this was  given:  40 mg on 2/14/2018  8:51 AM                                B-COMPLEX PO   Take 1 tablet by mouth daily                                bismuth subsalicylate 262 MG/15ML suspension   Commonly known as:  PEPTO BISMOL   Take 15 mLs by mouth every 6 hours as needed for indigestion                                calcium acetate (Phos Binder) 667 MG Tabs   Take 1 tablet by mouth 3 times daily (with meals)                                fluticasone 50 MCG/ACT spray   Commonly known as:  FLONASE   Spray 1-2 sprays into both nostrils daily                                loratadine 10 MG tablet   Commonly known as:  CLARITIN   Take 10 mg by mouth daily as needed Reported on 5/3/2017                                LOSARTAN POTASSIUM PO   Take 25 mg by mouth daily   Last time this was given:  25 mg on 2/14/2018  8:52 AM                                omeprazole 20 MG CR capsule   Commonly known as:  priLOSEC   Take 20 mg by mouth daily At night   Last time this was given:  20 mg on 2/13/2018  9:36 PM                                order for DME   Equipment being ordered: Carol () Treatment Diagnosis: ESRD on PD Pt has to be connected to PD all night and can not be disconnected, hence impending his mobility to go to the bathroom. At risk for infection if he does not have this equipment.                                predniSONE 5 MG tablet   Commonly known as:  DELTASONE   Take 1 tablet (5 mg) by mouth daily   Last time this was given:  5 mg on 2/14/2018  8:52 AM                                traMADol 50 MG tablet   Commonly known as:  ULTRAM   Take 1 tablet (50 mg) by mouth every 6 hours as needed for moderate pain   Last time this was given:  50 mg on 2/13/2018  9:36 PM                                * Notice:  This list has 2 medication(s) that are the same as other medications prescribed for you. Read the directions carefully, and ask your doctor or other care provider to review them with you.

## 2018-02-02 NOTE — ED NOTES
Bed: ED07  Expected date:   Expected time:   Means of arrival:   Comments:  Murray Su [id number] from [facility] states CHF with regurg hx and now regurg is worse and EF is now 10-15% and AI is more severe. Pt is having worse sx.

## 2018-02-02 NOTE — IP AVS SNAPSHOT
[address] [address]    Phone:  [phone]                                       After Visit Summary   2/2/2018    Murray Nicholson    MRN: [id number]           After Visit Summary Signature Page     I have received my discharge instructions, and my questions have been answered. I have discussed any challenges I see with this plan with the nurse or doctor.    ..........................................................................................................................................  Patient/Patient Representative Signature      ..........................................................................................................................................  Patient Representative Print Name and Relationship to Patient    ..................................................               ................................................  Date                                            Time    ..........................................................................................................................................  Reviewed by Signature/Title    ...................................................              ..............................................  Date                                                            Time

## 2018-02-02 NOTE — LETTER
Transition Communication Hand-off for Care Transitions to Next Level of Care Provider    Name: Murray Nicholson  MRN #: [id number]  Primary Care Provider: Ana Luisa Mcpherson     Primary Clinic: [address]  [facility] [address]     Reason for Hospitalization:  Acute on chronic systolic heart failure (H) [I50.23]  MEADE (dyspnea on exertion) [R06.09]  Admit Date/Time: 2/2/2018  4:42 PM  Discharge Date: 2/14/2018  Payor Source: Payor: [employer] / Plan: [employer] / Product Type: PPO /     Reason for Communication Hand-off Referral: Multiple providers/specialties    Discharge Plan: Discharge to home with OP CR and resumption of outpt HD.    Discharge Needs Assessment:  Needs       Most Recent Value    Equipment Currently Used at Home none    Transportation Available car, family or friend will provide    Outpatient Dialysis -- [Kunal Lange (Ph: [phone])]        Follow-up specialty is recommended: Yes    Follow-up plan:  Future Appointments  Date Time Provider Department Center   2/15/2018 6:00 AM Aryan Rice OT [facility]   3/7/2018 7:45 AM  LAB [facility]   3/7/2018 8:00 AM Mellisa Suh APRN [facility]   3/16/2018 9:30 AM Darvin Tang MD [facility]       Any outstanding tests or procedures:        Referrals     Future Labs/Procedures    CARDIAC REHAB REFERRAL     Comments:    *This therapy referral will be sent to [facility] Cardiac/Pulmonary Rehabilitation, [address] [place] [address]. Phone: [phone]    Please be aware that coverage of these services is subject to the terms and limitations of your health insurance plan.  Call member services at your health plan with any benefit or coverage questions.    Medication Therapy Management Referral     Comments:    Reason for referral:  on more than 5 medications and managing chronic disease and on more than 10 medications and hospitalized or in the ED in the past 6  "months     This service is designed to help you get the most from your medications.  A specially trained pharmacist will work closely with you and your doctors  to solve any problems related to your medications and to help you get the   best results from taking them.      The Medication Therapy Management staff will call you to schedule an appointment.    Occupational Therapy Referral     Comments:    *This therapy referral will be filtered to a centralized scheduling office at [facility] and the patient will receive a call to schedule an appointment at a [place] location most convenient for them. *     [facility] provides Occupational Therapy evaluation and treatment and many specialty services across the [place] system.  If requesting a specialty program, please choose from the list below.    If you have not heard from the scheduling office within 2 business days, please call [phone] for all locations, with the exception of [place], please call [phone].     Treatment: Evaluation & Treatment  Special Instructions/Modalities: Home with OP CR Phase II    Please be aware that coverage of these services is subject to the terms and limitations of your health insurance plan.  Call member services at your health plan with any benefit or coverage questions.      **Note to Provider:  If you are referring outside of [place] for the therapy appointment, please list the name of the location in the \"special instructions\" above, print the referral and give to the patient to schedule the appointment.            Key Recommendations:  Post hospitalization follow up, CORE clinic follow up in 1 week.  Kavitha Bates RN BSN  6C Unit Care Coordinator  Phone number: [phone]  Pager: [phone]              "

## 2018-02-03 ENCOUNTER — APPOINTMENT (OUTPATIENT)
Dept: CARDIOLOGY | Facility: CLINIC | Age: 63
DRG: 286 | End: 2018-02-03
Payer: COMMERCIAL

## 2018-02-03 LAB
ANION GAP SERPL CALCULATED.3IONS-SCNC: 10 MMOL/L (ref 3–14)
BUN SERPL-MCNC: 37 MG/DL (ref 7–30)
CALCIUM SERPL-MCNC: 8.1 MG/DL (ref 8.5–10.1)
CHLORIDE SERPL-SCNC: 107 MMOL/L (ref 94–109)
CO2 SERPL-SCNC: 25 MMOL/L (ref 20–32)
CREAT SERPL-MCNC: 6.41 MG/DL (ref 0.66–1.25)
ERYTHROCYTE [DISTWIDTH] IN BLOOD BY AUTOMATED COUNT: 20.1 % (ref 10–15)
GFR SERPL CREATININE-BSD FRML MDRD: 9 ML/MIN/1.7M2
GLUCOSE BLDC GLUCOMTR-MCNC: 113 MG/DL (ref 70–99)
GLUCOSE BLDC GLUCOMTR-MCNC: 219 MG/DL (ref 70–99)
GLUCOSE BLDC GLUCOMTR-MCNC: 245 MG/DL (ref 70–99)
GLUCOSE SERPL-MCNC: 121 MG/DL (ref 70–99)
HBA1C MFR BLD: 8.6 % (ref 4.3–6)
HCT VFR BLD AUTO: 31.1 % (ref 40–53)
HGB BLD-MCNC: 9.5 G/DL (ref 13.3–17.7)
INTERPRETATION ECG - MUSE: NORMAL
MCH RBC QN AUTO: 31 PG (ref 26.5–33)
MCHC RBC AUTO-ENTMCNC: 30.5 G/DL (ref 31.5–36.5)
MCV RBC AUTO: 102 FL (ref 78–100)
PLATELET # BLD AUTO: 252 10E9/L (ref 150–450)
POTASSIUM SERPL-SCNC: 4.4 MMOL/L (ref 3.4–5.3)
RBC # BLD AUTO: 3.06 10E12/L (ref 4.4–5.9)
SODIUM SERPL-SCNC: 142 MMOL/L (ref 133–144)
TROPONIN I SERPL-MCNC: 0.25 UG/L (ref 0–0.04)
WBC # BLD AUTO: 8.4 10E9/L (ref 4–11)

## 2018-02-03 PROCEDURE — 25000125 ZZHC RX 250: Performed by: INTERNAL MEDICINE

## 2018-02-03 PROCEDURE — 25000132 ZZH RX MED GY IP 250 OP 250 PS 637: Performed by: INTERNAL MEDICINE

## 2018-02-03 PROCEDURE — 27210742 ZZH CATH CR1

## 2018-02-03 PROCEDURE — 4A023N8 MEASUREMENT OF CARDIAC SAMPLING AND PRESSURE, BILATERAL, PERCUTANEOUS APPROACH: ICD-10-PCS | Performed by: INTERNAL MEDICINE

## 2018-02-03 PROCEDURE — 84484 ASSAY OF TROPONIN QUANT: CPT | Performed by: EMERGENCY MEDICINE

## 2018-02-03 PROCEDURE — 25000128 H RX IP 250 OP 636: Performed by: INTERNAL MEDICINE

## 2018-02-03 PROCEDURE — 80048 BASIC METABOLIC PNL TOTAL CA: CPT | Performed by: EMERGENCY MEDICINE

## 2018-02-03 PROCEDURE — 5A1D70Z PERFORMANCE OF URINARY FILTRATION, INTERMITTENT, LESS THAN 6 HOURS PER DAY: ICD-10-PCS | Performed by: INTERNAL MEDICINE

## 2018-02-03 PROCEDURE — 36415 COLL VENOUS BLD VENIPUNCTURE: CPT | Performed by: EMERGENCY MEDICINE

## 2018-02-03 PROCEDURE — 27211089 ZZH KIT ACIST INJECTOR CR3

## 2018-02-03 PROCEDURE — 25000132 ZZH RX MED GY IP 250 OP 250 PS 637: Performed by: STUDENT IN AN ORGANIZED HEALTH CARE EDUCATION/TRAINING PROGRAM

## 2018-02-03 PROCEDURE — 21400006 ZZH R&B CCU INTERMEDIATE UMMC

## 2018-02-03 PROCEDURE — 27210787 ZZH MANIFOLD CR2

## 2018-02-03 PROCEDURE — 93453 R&L HRT CATH W/VENTRICLGRPHY: CPT

## 2018-02-03 PROCEDURE — 85027 COMPLETE CBC AUTOMATED: CPT | Performed by: EMERGENCY MEDICINE

## 2018-02-03 PROCEDURE — 90937 HEMODIALYSIS REPEATED EVAL: CPT

## 2018-02-03 PROCEDURE — 27210946 ZZH KIT HC TOTES DISP CR8

## 2018-02-03 PROCEDURE — 25000131 ZZH RX MED GY IP 250 OP 636 PS 637: Performed by: INTERNAL MEDICINE

## 2018-02-03 PROCEDURE — 93453 R&L HRT CATH W/VENTRICLGRPHY: CPT | Mod: 26 | Performed by: INTERNAL MEDICINE

## 2018-02-03 PROCEDURE — C1894 INTRO/SHEATH, NON-LASER: HCPCS

## 2018-02-03 PROCEDURE — 00000146 ZZHCL STATISTIC GLUCOSE BY METER IP

## 2018-02-03 PROCEDURE — 99153 MOD SED SAME PHYS/QHP EA: CPT

## 2018-02-03 PROCEDURE — 99223 1ST HOSP IP/OBS HIGH 75: CPT | Mod: 24 | Performed by: INTERNAL MEDICINE

## 2018-02-03 PROCEDURE — 99152 MOD SED SAME PHYS/QHP 5/>YRS: CPT

## 2018-02-03 PROCEDURE — 27211181 ZZH BALLOON TIP PRESSURE CR5

## 2018-02-03 RX ORDER — DIPHENHYDRAMINE HYDROCHLORIDE 50 MG/ML
25-50 INJECTION INTRAMUSCULAR; INTRAVENOUS
Status: DISCONTINUED | OUTPATIENT
Start: 2018-02-03 | End: 2018-02-03 | Stop reason: HOSPADM

## 2018-02-03 RX ORDER — DEXTROSE MONOHYDRATE 25 G/50ML
12.5-5 INJECTION, SOLUTION INTRAVENOUS EVERY 30 MIN PRN
Status: DISCONTINUED | OUTPATIENT
Start: 2018-02-03 | End: 2018-02-03 | Stop reason: HOSPADM

## 2018-02-03 RX ORDER — NALOXONE HYDROCHLORIDE 0.4 MG/ML
.2-.4 INJECTION, SOLUTION INTRAMUSCULAR; INTRAVENOUS; SUBCUTANEOUS
Status: CANCELLED | OUTPATIENT
Start: 2018-02-03 | End: 2018-02-04

## 2018-02-03 RX ORDER — ADENOSINE 3 MG/ML
12-12000 INJECTION, SOLUTION INTRAVENOUS
Status: DISCONTINUED | OUTPATIENT
Start: 2018-02-03 | End: 2018-02-03 | Stop reason: HOSPADM

## 2018-02-03 RX ORDER — SODIUM NITROPRUSSIDE 25 MG/ML
100-200 INJECTION INTRAVENOUS
Status: DISCONTINUED | OUTPATIENT
Start: 2018-02-03 | End: 2018-02-03 | Stop reason: HOSPADM

## 2018-02-03 RX ORDER — LIDOCAINE 40 MG/G
CREAM TOPICAL
Status: CANCELLED | OUTPATIENT
Start: 2018-02-03

## 2018-02-03 RX ORDER — ONDANSETRON 2 MG/ML
4 INJECTION INTRAMUSCULAR; INTRAVENOUS EVERY 4 HOURS PRN
Status: DISCONTINUED | OUTPATIENT
Start: 2018-02-03 | End: 2018-02-03 | Stop reason: HOSPADM

## 2018-02-03 RX ORDER — ASPIRIN 325 MG
325 TABLET ORAL
Status: DISCONTINUED | OUTPATIENT
Start: 2018-02-03 | End: 2018-02-03 | Stop reason: HOSPADM

## 2018-02-03 RX ORDER — POTASSIUM CHLORIDE 7.45 MG/ML
10 INJECTION INTRAVENOUS
Status: DISCONTINUED | OUTPATIENT
Start: 2018-02-03 | End: 2018-02-03 | Stop reason: HOSPADM

## 2018-02-03 RX ORDER — PROTAMINE SULFATE 10 MG/ML
1-5 INJECTION, SOLUTION INTRAVENOUS
Status: DISCONTINUED | OUTPATIENT
Start: 2018-02-03 | End: 2018-02-03 | Stop reason: HOSPADM

## 2018-02-03 RX ORDER — EPINEPHRINE 1 MG/ML
0.3 INJECTION, SOLUTION, CONCENTRATE INTRAVENOUS
Status: DISCONTINUED | OUTPATIENT
Start: 2018-02-03 | End: 2018-02-03 | Stop reason: HOSPADM

## 2018-02-03 RX ORDER — PHENYLEPHRINE HCL IN 0.9% NACL 1 MG/10 ML
20-100 SYRINGE (ML) INTRAVENOUS
Status: DISCONTINUED | OUTPATIENT
Start: 2018-02-03 | End: 2018-02-03 | Stop reason: HOSPADM

## 2018-02-03 RX ORDER — DEXTROSE MONOHYDRATE 25 G/50ML
25-50 INJECTION, SOLUTION INTRAVENOUS
Status: DISCONTINUED | OUTPATIENT
Start: 2018-02-03 | End: 2018-02-14 | Stop reason: HOSPADM

## 2018-02-03 RX ORDER — NICARDIPINE HYDROCHLORIDE 2.5 MG/ML
100 INJECTION INTRAVENOUS
Status: DISCONTINUED | OUTPATIENT
Start: 2018-02-03 | End: 2018-02-03 | Stop reason: HOSPADM

## 2018-02-03 RX ORDER — CLOPIDOGREL BISULFATE 75 MG/1
75 TABLET ORAL
Status: DISCONTINUED | OUTPATIENT
Start: 2018-02-03 | End: 2018-02-03 | Stop reason: HOSPADM

## 2018-02-03 RX ORDER — CLOPIDOGREL BISULFATE 75 MG/1
300-600 TABLET ORAL
Status: DISCONTINUED | OUTPATIENT
Start: 2018-02-03 | End: 2018-02-03 | Stop reason: HOSPADM

## 2018-02-03 RX ORDER — MAGNESIUM HYDROXIDE 1200 MG/15ML
1000 LIQUID ORAL CONTINUOUS PRN
Status: DISCONTINUED | OUTPATIENT
Start: 2018-02-03 | End: 2018-02-03 | Stop reason: HOSPADM

## 2018-02-03 RX ORDER — ARGATROBAN 1 MG/ML
150 INJECTION, SOLUTION INTRAVENOUS
Status: DISCONTINUED | OUTPATIENT
Start: 2018-02-03 | End: 2018-02-03 | Stop reason: HOSPADM

## 2018-02-03 RX ORDER — PROTAMINE SULFATE 10 MG/ML
25-100 INJECTION, SOLUTION INTRAVENOUS EVERY 5 MIN PRN
Status: DISCONTINUED | OUTPATIENT
Start: 2018-02-03 | End: 2018-02-03 | Stop reason: HOSPADM

## 2018-02-03 RX ORDER — FENTANYL CITRATE 50 UG/ML
25-50 INJECTION, SOLUTION INTRAMUSCULAR; INTRAVENOUS
Status: DISCONTINUED | OUTPATIENT
Start: 2018-02-03 | End: 2018-02-03 | Stop reason: HOSPADM

## 2018-02-03 RX ORDER — HEPARIN SODIUM 1000 [USP'U]/ML
1000-10000 INJECTION, SOLUTION INTRAVENOUS; SUBCUTANEOUS EVERY 5 MIN PRN
Status: DISCONTINUED | OUTPATIENT
Start: 2018-02-03 | End: 2018-02-03 | Stop reason: HOSPADM

## 2018-02-03 RX ORDER — NITROGLYCERIN 20 MG/100ML
.07-2 INJECTION INTRAVENOUS CONTINUOUS PRN
Status: DISCONTINUED | OUTPATIENT
Start: 2018-02-03 | End: 2018-02-03 | Stop reason: HOSPADM

## 2018-02-03 RX ORDER — DOBUTAMINE HYDROCHLORIDE 200 MG/100ML
2-20 INJECTION INTRAVENOUS CONTINUOUS PRN
Status: DISCONTINUED | OUTPATIENT
Start: 2018-02-03 | End: 2018-02-03 | Stop reason: HOSPADM

## 2018-02-03 RX ORDER — PROMETHAZINE HYDROCHLORIDE 25 MG/ML
6.25-25 INJECTION, SOLUTION INTRAMUSCULAR; INTRAVENOUS EVERY 4 HOURS PRN
Status: DISCONTINUED | OUTPATIENT
Start: 2018-02-03 | End: 2018-02-03 | Stop reason: HOSPADM

## 2018-02-03 RX ORDER — FLUMAZENIL 0.1 MG/ML
0.2 INJECTION, SOLUTION INTRAVENOUS
Status: CANCELLED | OUTPATIENT
Start: 2018-02-03 | End: 2018-02-04

## 2018-02-03 RX ORDER — ATROPINE SULFATE 0.1 MG/ML
.5-1 INJECTION INTRAVENOUS
Status: DISCONTINUED | OUTPATIENT
Start: 2018-02-03 | End: 2018-02-03 | Stop reason: HOSPADM

## 2018-02-03 RX ORDER — BENZONATATE 100 MG/1
100 CAPSULE ORAL 3 TIMES DAILY PRN
Status: DISCONTINUED | OUTPATIENT
Start: 2018-02-03 | End: 2018-02-14 | Stop reason: HOSPADM

## 2018-02-03 RX ORDER — LORAZEPAM 2 MG/ML
.5-2 INJECTION INTRAMUSCULAR EVERY 4 HOURS PRN
Status: DISCONTINUED | OUTPATIENT
Start: 2018-02-03 | End: 2018-02-03 | Stop reason: HOSPADM

## 2018-02-03 RX ORDER — NIFEDIPINE 10 MG/1
10 CAPSULE ORAL
Status: DISCONTINUED | OUTPATIENT
Start: 2018-02-03 | End: 2018-02-03 | Stop reason: HOSPADM

## 2018-02-03 RX ORDER — PRASUGREL 10 MG/1
10-60 TABLET, FILM COATED ORAL
Status: DISCONTINUED | OUTPATIENT
Start: 2018-02-03 | End: 2018-02-03 | Stop reason: HOSPADM

## 2018-02-03 RX ORDER — FUROSEMIDE 10 MG/ML
20-100 INJECTION INTRAMUSCULAR; INTRAVENOUS
Status: DISCONTINUED | OUTPATIENT
Start: 2018-02-03 | End: 2018-02-03 | Stop reason: HOSPADM

## 2018-02-03 RX ORDER — ASPIRIN 81 MG/1
81-324 TABLET, CHEWABLE ORAL
Status: DISCONTINUED | OUTPATIENT
Start: 2018-02-03 | End: 2018-02-03 | Stop reason: HOSPADM

## 2018-02-03 RX ORDER — NITROGLYCERIN 0.4 MG/1
0.4 TABLET SUBLINGUAL EVERY 5 MIN PRN
Status: DISCONTINUED | OUTPATIENT
Start: 2018-02-03 | End: 2018-02-03 | Stop reason: HOSPADM

## 2018-02-03 RX ORDER — ASPIRIN 81 MG/1
81 TABLET ORAL DAILY
Status: CANCELLED | OUTPATIENT
Start: 2018-02-04

## 2018-02-03 RX ORDER — ENALAPRILAT 1.25 MG/ML
1.25-2.5 INJECTION INTRAVENOUS
Status: DISCONTINUED | OUTPATIENT
Start: 2018-02-03 | End: 2018-02-03 | Stop reason: HOSPADM

## 2018-02-03 RX ORDER — HYDRALAZINE HYDROCHLORIDE 20 MG/ML
10-20 INJECTION INTRAMUSCULAR; INTRAVENOUS
Status: DISCONTINUED | OUTPATIENT
Start: 2018-02-03 | End: 2018-02-03 | Stop reason: HOSPADM

## 2018-02-03 RX ORDER — EPTIFIBATIDE 2 MG/ML
1 INJECTION, SOLUTION INTRAVENOUS CONTINUOUS PRN
Status: DISCONTINUED | OUTPATIENT
Start: 2018-02-03 | End: 2018-02-03 | Stop reason: HOSPADM

## 2018-02-03 RX ORDER — NALOXONE HYDROCHLORIDE 0.4 MG/ML
.1-.4 INJECTION, SOLUTION INTRAMUSCULAR; INTRAVENOUS; SUBCUTANEOUS
Status: CANCELLED | OUTPATIENT
Start: 2018-02-03

## 2018-02-03 RX ORDER — LIDOCAINE HYDROCHLORIDE 10 MG/ML
30 INJECTION, SOLUTION EPIDURAL; INFILTRATION; INTRACAUDAL; PERINEURAL
Status: DISCONTINUED | OUTPATIENT
Start: 2018-02-03 | End: 2018-02-03 | Stop reason: HOSPADM

## 2018-02-03 RX ORDER — ATROPINE SULFATE 0.1 MG/ML
0.5 INJECTION INTRAVENOUS EVERY 5 MIN PRN
Status: CANCELLED | OUTPATIENT
Start: 2018-02-03 | End: 2018-02-04

## 2018-02-03 RX ORDER — METHYLPREDNISOLONE SODIUM SUCCINATE 125 MG/2ML
125 INJECTION, POWDER, LYOPHILIZED, FOR SOLUTION INTRAMUSCULAR; INTRAVENOUS
Status: DISCONTINUED | OUTPATIENT
Start: 2018-02-03 | End: 2018-02-03 | Stop reason: HOSPADM

## 2018-02-03 RX ORDER — VERAPAMIL HYDROCHLORIDE 2.5 MG/ML
1-5 INJECTION, SOLUTION INTRAVENOUS
Status: DISCONTINUED | OUTPATIENT
Start: 2018-02-03 | End: 2018-02-03 | Stop reason: HOSPADM

## 2018-02-03 RX ORDER — ARGATROBAN 1 MG/ML
350 INJECTION, SOLUTION INTRAVENOUS
Status: DISCONTINUED | OUTPATIENT
Start: 2018-02-03 | End: 2018-02-03 | Stop reason: HOSPADM

## 2018-02-03 RX ORDER — POTASSIUM CHLORIDE 29.8 MG/ML
20 INJECTION INTRAVENOUS
Status: DISCONTINUED | OUTPATIENT
Start: 2018-02-03 | End: 2018-02-03 | Stop reason: HOSPADM

## 2018-02-03 RX ORDER — NICOTINE POLACRILEX 4 MG
15-30 LOZENGE BUCCAL
Status: DISCONTINUED | OUTPATIENT
Start: 2018-02-03 | End: 2018-02-14 | Stop reason: HOSPADM

## 2018-02-03 RX ORDER — METOPROLOL TARTRATE 1 MG/ML
5 INJECTION, SOLUTION INTRAVENOUS EVERY 5 MIN PRN
Status: DISCONTINUED | OUTPATIENT
Start: 2018-02-03 | End: 2018-02-03 | Stop reason: HOSPADM

## 2018-02-03 RX ORDER — NITROGLYCERIN 5 MG/ML
100-500 VIAL (ML) INTRAVENOUS
Status: DISCONTINUED | OUTPATIENT
Start: 2018-02-03 | End: 2018-02-03 | Stop reason: HOSPADM

## 2018-02-03 RX ORDER — FLUMAZENIL 0.1 MG/ML
0.2 INJECTION, SOLUTION INTRAVENOUS
Status: DISCONTINUED | OUTPATIENT
Start: 2018-02-03 | End: 2018-02-03 | Stop reason: HOSPADM

## 2018-02-03 RX ORDER — DOPAMINE HYDROCHLORIDE 160 MG/100ML
2-20 INJECTION, SOLUTION INTRAVENOUS CONTINUOUS PRN
Status: DISCONTINUED | OUTPATIENT
Start: 2018-02-03 | End: 2018-02-03 | Stop reason: HOSPADM

## 2018-02-03 RX ORDER — EPTIFIBATIDE 2 MG/ML
180 INJECTION, SOLUTION INTRAVENOUS EVERY 10 MIN PRN
Status: DISCONTINUED | OUTPATIENT
Start: 2018-02-03 | End: 2018-02-03 | Stop reason: HOSPADM

## 2018-02-03 RX ORDER — NALOXONE HYDROCHLORIDE 0.4 MG/ML
0.4 INJECTION, SOLUTION INTRAMUSCULAR; INTRAVENOUS; SUBCUTANEOUS EVERY 5 MIN PRN
Status: DISCONTINUED | OUTPATIENT
Start: 2018-02-03 | End: 2018-02-03 | Stop reason: HOSPADM

## 2018-02-03 RX ORDER — NITROGLYCERIN 5 MG/ML
100-200 VIAL (ML) INTRAVENOUS
Status: DISCONTINUED | OUTPATIENT
Start: 2018-02-03 | End: 2018-02-03 | Stop reason: HOSPADM

## 2018-02-03 RX ORDER — FENTANYL CITRATE 50 UG/ML
25-50 INJECTION, SOLUTION INTRAMUSCULAR; INTRAVENOUS
Status: CANCELLED | OUTPATIENT
Start: 2018-02-03 | End: 2018-02-04

## 2018-02-03 RX ADMIN — SODIUM CHLORIDE 250 ML: 9 INJECTION, SOLUTION INTRAVENOUS at 15:18

## 2018-02-03 RX ADMIN — INSULIN ASPART 2 UNITS: 100 INJECTION, SOLUTION INTRAVENOUS; SUBCUTANEOUS at 21:52

## 2018-02-03 RX ADMIN — Medication 500 UNITS: at 13:46

## 2018-02-03 RX ADMIN — MIDAZOLAM 1 MG: 1 INJECTION INTRAMUSCULAR; INTRAVENOUS at 13:50

## 2018-02-03 RX ADMIN — HYDRALAZINE HYDROCHLORIDE 20 MG: 10 TABLET, FILM COATED ORAL at 06:02

## 2018-02-03 RX ADMIN — Medication: at 15:18

## 2018-02-03 RX ADMIN — INSULIN ASPART 1 UNITS: 100 INJECTION, SOLUTION INTRAVENOUS; SUBCUTANEOUS at 02:17

## 2018-02-03 RX ADMIN — CALCIUM ACETATE 667 MG: 667 CAPSULE ORAL at 17:13

## 2018-02-03 RX ADMIN — HEPARIN SODIUM 5000 UNITS: 5000 INJECTION, SOLUTION INTRAVENOUS; SUBCUTANEOUS at 21:54

## 2018-02-03 RX ADMIN — FENTANYL CITRATE 50 MCG: 50 INJECTION, SOLUTION INTRAMUSCULAR; INTRAVENOUS at 13:46

## 2018-02-03 RX ADMIN — ASPIRIN 81 MG CHEWABLE TABLET 81 MG: 81 TABLET CHEWABLE at 10:26

## 2018-02-03 RX ADMIN — PREDNISONE 5 MG: 5 TABLET ORAL at 10:27

## 2018-02-03 RX ADMIN — METOPROLOL TARTRATE 12.5 MG: 25 TABLET, FILM COATED ORAL at 10:26

## 2018-02-03 RX ADMIN — Medication 1500 UNITS: at 13:46

## 2018-02-03 RX ADMIN — INSULIN ASPART 1 UNITS: 100 INJECTION, SOLUTION INTRAVENOUS; SUBCUTANEOUS at 17:13

## 2018-02-03 RX ADMIN — MIDAZOLAM 1 MG: 1 INJECTION INTRAMUSCULAR; INTRAVENOUS at 13:47

## 2018-02-03 RX ADMIN — OMEPRAZOLE 20 MG: 20 CAPSULE, DELAYED RELEASE ORAL at 21:54

## 2018-02-03 RX ADMIN — HEPARIN SODIUM 5000 UNITS: 5000 INJECTION, SOLUTION INTRAVENOUS; SUBCUTANEOUS at 10:28

## 2018-02-03 RX ADMIN — ATORVASTATIN CALCIUM 40 MG: 40 TABLET, FILM COATED ORAL at 10:27

## 2018-02-03 RX ADMIN — ALLOPURINOL 400 MG: 300 TABLET ORAL at 10:26

## 2018-02-03 RX ADMIN — BENZONATATE 100 MG: 100 CAPSULE, LIQUID FILLED ORAL at 04:21

## 2018-02-03 RX ADMIN — SODIUM CHLORIDE 300 ML: 9 INJECTION, SOLUTION INTRAVENOUS at 15:18

## 2018-02-03 NOTE — PROCEDURES
FINAL CARDIAC CATH REPORT    PROCEDURES PERFORMED:   Right Heart Catheterization  Left Heart Catheterization    PHYSICIANS:  Attending Physician: Montrell Posada MD  Interventional Cardiology Fellow: Arpan Corbin MD  Cardiology Fellow: None    INDICATION:  Murray Nicholson is a 63 year old male with hx of Cardiomyopathy EF 15-20%. He has had known non-obstructive CAD dating back approximately 10 years. He presented to the hospital in decompensated heart failure. Presented to the cath lab for RHC and angiogram to evaluate for ischemic cardiomyopathy.    DESCRIPTION:  1. Consent obtained with discussion of risks.  All questions were answered.  2. Sterile prep and procedure.  3. Location with Sheaths:   7Fr RFV  4Fr RFA  4. Access: Local anesthetic with lidocaine.  A standard 18 guage needle with ultrasound guidance was used to establish vascular access using a modified Seldinger technique.  5. Diagnostic Catheters:   Clinton, pigtail  6. Estimated blood loss: < 5 ml    MEDICATIONS:  The procedure was performed under conscious sedation for 25 minutes from 1345 to 1410.  The patient was assessed immediately before the first sedation medication was administered.  Midazolam 2 mg and Fentanyl 50 mcg were administered.  Heart rate, BP, respiration, oxygen saturation and patient responses were monitored throughout the procedure with the assistance of the RN under my supervision.    Procedures:    HEMODYNAMICS:  BSA 1.96  1.  bpm  2. BP 97/62/77 mmHg  3. RA 18/13/12   4. RV 56/18  5. PA 56/37/46   6. PCW 45/36/37   7. PA sat 32.9%   8. PCW sat 98.1% (confirmed x2)  9. Hgb 8.9 g/dL   10. Kassi CO 3.16   11. Kassi CI 1.63  12. SVR 1645  14. PVR 2.85     LEFT HEART CATHETERIZATION:  1. LVEDP 41 mmHg.  2. No gradient across the aortic valve on pull back.    Sheath Removal:  The RFV/RFA sheath was manually removed in the cardiac catheterization laboratory.    Contrast: Isovue, 0 ml     Fluoroscopy Time: 2.3 min    COMPLICATIONS:  1.  None    SUMMARY:   High right sided filling pressures.  Very High left sided filling pressures.  Severe pulmonary artery hypertension  High left ventricular filling pressures.  Reduced cardiac output, 3.16 L/min with index 1.63 L/min/m2     Coronary angiogram deferred given elevated left sided filling pressures.    PLAN:   Bedrest per protocol.  Continued medical management and lifestyle modification for cardiovascular risk factor optimization.   Plan for dialysis today to remove fluid.    The attending interventional cardiologist was present and supervised all critical aspects the procedure.    Findings discussed with primary team.    See CVIS report for final draft.    FAIZA Corbin MD, PhD   Cardiology Fellow      Staff Cardiologist: I supervised the cardiology fellow and reviewed the hemodynamic findings with the fellow at the completion of the procedure.  I agree with the documentation above.    Montrell Posada MD

## 2018-02-03 NOTE — PHARMACY-ADMISSION MEDICATION HISTORY
Admission medication history interview status for the 2/2/2018 admission is complete. See Epic admission navigator for allergy information, pharmacy, prior to admission medications and immunization status.     Medication history interview sources:  Patient, Mayapilaryared, med list from home    Changes made to PTA medication list (reason)  Added: None  Deleted: Gentamycin cream  Changed: None    Additional medication history information (including reliability of information, actions taken by pharmacist):  Patient in very good mood, pleasure to speak with and was a good historian. However, upon reading a recent doctor note on Winifred, we established several medications that he hadn't been taking when indeed his physician wanted him to continue on them. These messages can be found in chart review.  Confirmed allergies.  Confirmed patient pharmacy at [facility] in [place].  Influenza vaccine: Yes, 11/2017  Isosorbide hasn't been taken since January 20th because he thought it was discontinued. Per MD Winifred wants him continuing.  Torsemide hasn't been taken for 5 days because of the same reason as stated above.        Prior to Admission medications    Medication Sig Last Dose Taking? Auth Provider   LOSARTAN POTASSIUM PO Take 25 mg by mouth daily 2/2/2018 at AM Yes Unknown, Entered By History   metoprolol succinate (TOPROL-XL) 25 MG 24 hr tablet Take 1 tablet (25 mg) by mouth daily 2/2/2018 at AM Yes Ana Luisa Mcpherson MD   traMADol (ULTRAM) 50 MG tablet Take 1 tablet (50 mg) by mouth every 6 hours as needed for moderate pain Past Week at Unknown time Yes Naresh Aguayo MD   B Complex-Biotin-FA (B-COMPLEX PO) Take 1 tablet by mouth daily 2/1/2018 at Unknown time Yes Unknown, Entered By History   ACETAMINOPHEN PO Take 500-1,000 mg by mouth nightly as needed for pain 1/27/2018 at PRN Yes Unknown, Entered By History   bismuth subsalicylate (PEPTO BISMOL) 262 MG/15ML suspension Take 15 mLs by  mouth every 6 hours as needed for indigestion Past Month at PRN Yes Unknown, Entered By History   torsemide (DEMADEX) 100 MG tablet Take 1 tablet (100 mg) by mouth 2 times daily Past Week at Unknown time Yes Ana Luisa Mcpherson MD   calcium acetate, Phos Binder, 667 MG TABS Take 1 tablet by mouth 3 times daily (with meals) 2/2/2018 at AM Yes Ana Luisa Mcpherson MD   predniSONE (DELTASONE) 5 MG tablet Take 1 tablet (5 mg) by mouth daily 2/2/2018 at AM Yes Darvin Tang MD   predniSONE (DELTASONE) 20 MG tablet Take 2 tablets (40 mg) by mouth daily for 3 days for gout flares Past Month at PRN Yes Darvin Tang MD   allopurinol (ZYLOPRIM) 100 MG tablet Take 1 tablet (100 mg) by mouth daily Take with 300 mg tabs for 400 mg /day total. 2/2/2018 at AM Yes Darvin Tang MD   allopurinol (ZYLOPRIM) 300 MG tablet Take 1 tablet (300 mg) by mouth daily Take with 100 mg tabs for 400 mg /day total. 2/2/2018 at AM Yes Darvin Tang MD   albuterol (PROAIR HFA/PROVENTIL HFA/VENTOLIN HFA) 108 (90 BASE) MCG/ACT Inhaler Inhale 2 puffs into the lungs every 6 hours as needed for shortness of breath / dyspnea or wheezing 2/1/2018 at PRN Yes Yahir Turcios MD   fluticasone (FLONASE) 50 MCG/ACT spray Spray 1-2 sprays into both nostrils daily  Patient taking differently: Spray 2 sprays into both nostrils daily  2/1/2018 at PM Yes Yahir Turcios MD   atorvastatin (LIPITOR) 40 MG tablet Take 1 tablet (40 mg) by mouth daily 2/1/2018 at PM Yes Leia De Leon APRN CNP   isosorbide mononitrate (IMDUR) 60 MG 24 hr tablet Take 1 tablet (60 mg) by mouth daily 1/20/2018 Yes Leia De Leon APRN CNP   omeprazole (PRILOSEC) 20 MG CR capsule Take 20 mg by mouth daily At night 2/1/2018 at PM Yes Unknown, Entered By History   loratadine (CLARITIN) 10 MG tablet Take 10 mg by mouth daily as needed Reported on 5/3/2017 Past Month at Unknown time Yes Reported, Patient   ASPIRIN 81 MG OR TABS Take 1 tablet (81 mg) by mouth at  bedtime 2/1/2018 at PM Yes Reported, Patient   order for DME Equipment being ordered: Commode ()  Treatment Diagnosis: ESRD on PD  Pt has to be connected to PD all night and can not be disconnected, hence impending his mobility to go to the bathroom. At risk for infection if he does not have this equipment.   Breanne Miller MD         Medication history completed by: Miah Ocampo, PD2 Student Pharmacist    Ellis De La CruzD

## 2018-02-03 NOTE — PLAN OF CARE
Problem: Patient Care Overview  Goal: Plan of Care/Patient Progress Review  Outcome: No Change  Pt here for heart failure exacerbation. VSS, soft BP, seen by MD overnight. Awaiting possible Angio. NPO since MN. Patient needs complete reeducation of DM disease process and treatment. Social work consult ordered. Day RN aware of needs. Slept well overnight. No other issues noted. Will continue to monitor and address as needed.

## 2018-02-03 NOTE — ED NOTES
[facility], [place]   ED Nurse to Floor Handoff     Murray Nicholson is a 63 year old male who speaks English and lives alone,  in a home  They arrived in the ED by ambulance from home    ED Chief Complaint: Shortness of Breath    ED Dx;   Final diagnoses:   MEADE (dyspnea on exertion) - moderate to severe with worsening cardiomyopathy (EF now 10-15%)   Acute on chronic systolic heart failure (H)         Needed?: No    Allergies:   Allergies   Allergen Reactions     Norco [Hydrocodone-Acetaminophen] Nausea and Vomiting     Cats      Throat tightness     Penicillins Hives     Seasonal Allergies      rhinitis     Shrimp      Throat closes    .  Past Medical Hx:   Past Medical History:   Diagnosis Date     (HFpEF) heart failure with preserved ejection fraction (H)      Allergic rhinitis, cause unspecified      Anemia of chronic kidney failure      Ascending aortic aneurysm (H)      Bicuspid aortic valve      CAD (coronary artery disease)      Chronic kidney disease, stage 5 (H)      Congestive heart failure, unspecified      Dyslipidemia      Esophageal reflux      Hypersomnia with sleep apnea, unspecified      Hypertension      MGUS (monoclonal gammopathy of unknown significance)      SHEELA (obstructive sleep apnea)      Systolic heart failure (H)      Type 2 diabetes mellitus (H)       Baseline Mental status: WDL  Current Mental Status changes: at basesline    Infection: No  Sepsis suspected: No  Isolation type: No active isolations     Activity level - Baseline/Home:  Independent  Activity Level - Current:   Independent    Bariatric equipment needed?: No    In the ED these meds were given: Medications - No data to display    Drips running?  No    Home pump or pre-existing LDA's present? Yes    Labs results:   Labs Ordered and Resulted from Time of ED Arrival Up to the Time of Departure from the ED   NT PROBNP INPATIENT - Abnormal; Notable for the following:        Result Value     "N-Terminal Pro BNP Inpatient 16681 (*)     All other components within normal limits   CBC WITH PLATELETS DIFFERENTIAL - Abnormal; Notable for the following:     RBC Count 2.91 (*)     Hemoglobin 9.1 (*)     Hematocrit 29.2 (*)     MCHC 31.2 (*)     RDW 19.7 (*)     Nucleated RBCs 1 (*)     All other components within normal limits   COMPREHENSIVE METABOLIC PANEL - Abnormal; Notable for the following:     Glucose 240 (*)     Creatinine 5.58 (*)     GFR Estimate 10 (*)     GFR Estimate If Black 13 (*)     Calcium 8.2 (*)     Albumin 2.1 (*)     Protein Total 6.2 (*)     All other components within normal limits   D DIMER QUANTITATIVE - Abnormal; Notable for the following:     D Dimer 3.3 (*)     All other components within normal limits   TROPONIN I - Abnormal; Notable for the following:     Troponin I ES 0.247 (*)     All other components within normal limits   ISTAT  GASES LACTATE BOOM POCT - Abnormal; Notable for the following:     Ph Venous 7.49 (*)     PCO2 Venous 35 (*)     All other components within normal limits   ISTAT CG4 GASES LACTATE BOOM NURSING POCT   PULSE OXIMETRY NURSING   PERIPHERAL IV CATHETER   CARDIAC CONTINUOUS MONITORING       Imaging Studies:   Recent Results (from the past 24 hour(s))   Chest XR,  PA & LAT    Narrative    Preliminary     Impression    impression: Decrease hazy airspace opacities compared with 1/12/2018,  now predominantly in the right lower lung field. Findings likely  represent a resolving infection, although a new infection cannot be  excluded.       Recent vital signs:   /77  Temp 98.3  F (36.8  C) (Oral)  Resp 18  Ht 1.753 m (5' 9\")  SpO2 (!) 85%    Cardiac Rhythm: Tachycardia  Pt needs tele? Yes  Skin/wound Issues: None    Code Status: Full Code    Pain control: pt had none    Nausea control: pt had none    Abnormal labs/tests/findings requiring intervention: EF 10%    Family present during ED course? No   Family Comments/Social Situation comments: N/a    Tasks " needing completion: None    Tala Dunbar, RN    [phone]

## 2018-02-03 NOTE — PROGRESS NOTES
Cards 1 Progress Note    Date of Admission: 2/2/2018  Hospital Day #: 1   Date of Service (when I saw the patient): 02/03/2018     Assessment & Plan   Murray Nicholson is a 63 year old male with a history of non-ob CAD, HFrEF d/t ICM EF 10-15%, functionally bicuspid AV with ascending aortic aneurysm, ESRD on HD, HTN, T2DM who presented with worsening MEADE & orthopnea in the setting of worsening ejection fraction.    Main Plans for Today:  - Dialysis  - Hold anti-HTN    # Acute on chronic HFrEF due to ICM, EF 10-15%  # CAD   Patient has been unable to have repeat angiogram (it was felt his valvular disease was not bad enough to cause this reduction in systolic function) despite worsening EF since initial evaluation due to CKD, he is now on hemodialysis and has yet another drop in EF since 4/2017. Has been unable to tolerate HF medications recently due to hypotension, although is having more MEADE and orthopnea concerning for hypervolemia.  Echo 2/2: EF 10-15%, LV dilation, mod AR, mod MR, trace TR  Angio 2006: 60-70% LAD lesion in dLAD FFR 0.85 and 30% pRCA-- Right dominant  RHC 2/3: RA 12 mPA 46 PW 37 CI 1.63  - BB: Holding due to acute decompensation  - ACEi/ARB: Holding due to hypotension  - Afterload: Holding home hydralazine due to hypotension  - Aldosterone antagonist: ESRD  - Volume: hypervolemic, HD today  - CAD:   > Aspirin 81 mg daily   > Atorvastatin 40 mg daily   > Will need angiogram once volume status improved    # ESRD on HD T/TH/Sa  Likely secondary to HTN and T2DM. Has tunneled catheter for his access. Was previously on peritoneal dialysis but this was complicated by peritonitis.   - Nephrology consult, appreciate recs    # T2DM  - Hold home glipizide  - Low intensity SSI  - Hypoglycemia protocol    # Gout  - Continue PTA prednisone 5 mg daily  - Continue PTA allopurinol 400 mg daily    # Normocytic anemia  Likely secondary to ESRD    FEN  -   Active Diet Order      NPO for Medical/Clinical Reasons Except  "for: Meds, Ice Chips  - PRN lyte replacement    Prophy/Misc  VTE:  Heparin SQ  - GI/PUD: PPI  - Bowels: PRN     Lines:   - HD line  - PIV    Consults: Nephrology    Code status: Full Code  Disposition: Expected discharge: 2 - 3 days, recommended to prior living arrangement once improved volume status, coronary angiogram.    Patient seen and discussed with Dr. Muñoz, who agrees with the above assessment and plan.    Colette Chu  PGY3 IM  [id number]    CARDIOLOGY STAFF NOTE  I have seen and examined the patient with the [facility] team. I agree with the note above that reflects my assessment and plan of care.  Deandre Muñoz MD [facility]  Cardiology - Electrophysiology        _____________________________________________________________________________  Interval History   Nursing notes reviewed.    NAEON. Patient feels ok at rest. Exertion will cause dyspnea. He denies chest pain. Able to lie flat right now without issue.    Physical Exam   /73 (BP Location: Right arm)  Temp 98.5  F (36.9  C) (Oral)  Resp (P) 22  Ht 1.753 m (5' 9\")  Wt 78.8 kg (173 lb 12.8 oz)  SpO2 97%  BMI 25.67 kg/m2  I/O last 3 completed shifts:  In: 240 [P.O.:240]  Out: -     Constitutional: NAD. Resting in bed comfortably.  Respiratory: LCTAB. No wheezing or crackles.  Cardiovascular: Regular rate and rhythm. No murmur or rub. JVD at base of neck  GI: S, NT, ND, no rebound or guarding.  Skin: Warm and dry. No concerning lesions or rash on exposed surfaces.  Musculoskeletal: No LE edema.    Medications   Inpatient medications reviewed.    Data   CBC  Recent Labs  Lab 02/03/18  0613 02/02/18  2213 02/02/18  1736   WBC 8.4  --  7.4   RBC 3.06*  --  2.91*   HGB 9.5*  --  9.1*   HCT 31.1*  --  29.2*   *  --  100   MCH 31.0  --  31.3   MCHC 30.5*  --  31.2*   RDW 20.1*  --  19.7*    263 229     CMP  Recent Labs  Lab 02/03/18  0613 02/02/18  1736    141   POTASSIUM 4.4 4.4   CHLORIDE 107 106   CO2 25 22   ANIONGAP 10 13   * " 240*   BUN 37* 30   CR 6.41* 5.58*   GFRESTIMATED 9* 10*   GFRESTBLACK 11* 13*   TRINI 8.1* 8.2*   PROTTOTAL  --  6.2*   ALBUMIN  --  2.1*   BILITOTAL  --  0.4   ALKPHOS  --  86   AST  --  20   ALT  --  16     INRNo lab results found in last 7 days.    Results for orders placed or performed during the hospital encounter of 02/02/18   Chest XR,  PA & LAT    Narrative    EXAM: XR CHEST 2 VW  2/2/2018 6:54 PM     HISTORY:  SOB;        COMPARISON:  CT 1/12/2018    FINDINGS: PA and lateral radiographs of the chest. The right IJ  tunneled dialysis catheter present; tip is projected over the low SVC.  Heart size is enlarged but stable. Decreased hazy airspace opacities,  now predominantly in the right lower lung field. No pneumothorax. No  pleural effusions.      Impression    IMPRESSION: Decreased hazy airspace opacities compared with 1/12/2018,  now predominantly in the right lower lung field. Findings likely  represent a resolving infection, although a new infection cannot be  excluded.    I have personally reviewed the examination and initial interpretation  and I agree with the findings.    YEVGENIY DELEON MD

## 2018-02-03 NOTE — PROGRESS NOTES
Cards 2 Transfer Acceptance Note    Patient will be transferred from the Cards 1 service to the Cards 2 service for ongoing management of systolic heart failure.     Briefly, Mr. Nicholson is a 63 yr old male with a history of ICM, HFrEF (10-15%), ESRD (on HD), bicuspid aortic valve, HTN and DM was seen in cardiology clinic on 2/1 with concern for worsening dyspnea as well as tachycardia and hyppotension. He was told by his nephrologist to stop a number of his cardiac medications (spironolactone, hydralazine, amlodipine) in the setting of hypotension at dialysis but it appears he also stopped other medications including metoprolol, losartan, Imdur and torsemide.     Echo was completed on 2/2 and showed further reduction in LVEF to 10-15% and severe LV dilatation, patient was told to go to the ED for further evaluation. RHC completed today showing elevated elevated right (RV 56/18) and left-sided (PCWP 45) pressures.    Plan:  - Nephrology consulted, dialysis today  - Continue to hold anti-hypertensives    --> Will consider starting afterload reduction tomorrow  - Needs coronary angiogram, previously deferred in effort to preserve renal function  - Consider CT or MRA of aorta to assess ascending aortic aneurysm  - Cardiac diet ordered    Mellisa Brunner, PGY3    I have reviewed today's vital signs, notes, medications, labs and imaging.  I have also seen and examined the patient and agree with the findings and plan as outlined above.  Pt with SOB and MEADE.  He currently has endstage heart failure and ESRD on dialysis with cardiac cath today revealing LVEDP 46.  VSS with lungs with bibasilar rales and S1 and S2 with soft II/VI HSM and +S3.  Labs as above.  Assessmnet; Pt with endstage heart failure (decompensated) with marked hypervolemic state.  Will need daily HD with removal of at least 3L of fluid.      Bobby Muse MD, PhD  Professor, Heart Failure and Cardiac Transplantation  [facility]

## 2018-02-03 NOTE — H&P
Internal Medicine History and Physical    Murray Nicholson MRN# [id number]   Age: 63 year old YOB: 1955     Date of Admission:  2/2/2018    Primary care provider: Ana Luisa Mcpherson          Assessment and Plan:   Assessment:  63 year old male with HFrEF 10-15%, ascending aortic aneurysm (4.2cm), bicuspid aortic valve, CAD presents today from ECHO lab for shortness of breath and worsening EF.     Plan:    # SOB, tachycardia, hypotension  Most likely cause of his chronic progressive dyspnea is CHF. Pt has elevated BNP, decreased EF, tachycardia. Arguing against this is his lack of elevated JVD, IVC dilation, or pulmonary edema on chest xray. An alternative diagnosis could be PE though the chronicity of symptoms and lack of RV strain makes this less likely. Low suspicion for PNA with nml WBC count. The dyspnea is episodic and associated with activity so arrhythmia is on the differential.   - Telemetry  - Strict I/Os  - Daily weights  - Start after load reduction with hydralazine 20 mg TID  - Hold metoprolol 25 mg BID due to decompensated heart failure  - Angiogram and rt heart cath     # HFrEF EF 10 - 15%, suspect ICM  # Ascending aortic aneurysm 4.2 cm  # Bicuspid aortic valve  # CAD   - Continue PTA ASA 81 mg, atorvastatin 40 mg qhs  - Continue holding spironolactone, amlodipine, losartan, torsemide    # New urinary incontinence x 3 days.   - UA    Chronic Problems  # Anemia of chronic disease. Stable Hgb. Not likely contributing to resp symptoms.     # ESRD. Dialysis T/Th/Sat  - Nephrology consult  - israel acetate 667 TID    # T2DM. On glipizide at discharge in Jan. Last A1C 9.1.   - HA1C   - Low intensity SSI while in patient  - Will need a plan for discharge    # Gout. Well controlled.   - Prednisone 5 mg qday  - Prescribed allopurinol 400mg qday    # MGUS. Last seen by heme in 2015 at which time they signed off.     PPX:   - VTE: heparin   - GI: home omeprazole   FEN: Cardiac diet, NPO at MN in case of  procedure in AM; No replacement protocols 2/2 ESRD.   Consults: Nephro (dialysis)  Tubes/Lines/Drains: Tunneled dialysis in right chest. PIV x 1  CODE: Full. Decision makers would be 2 adult sons if needed.   Family: Patient updated and understands the plan.   Dispo: Home pending cardiac work-up.     Patient will be staffed in AM.     Evangelina Yu MD  Internal Medicine PGY3  Pager [phone]          Chief Complaint:     Shortness of breath         History of Present Illness:     Mr. Nicholson presents from ECHO lab with worsening EF and shortness of breath.     His SOB has been slowly progressive over the past few years but more acutely over the past month. It is primarily associated with activity. He is unable to climb the 2 flights of stairs to get to his apartment without resting. Endorses gasping from breath at night occasionally but is able to lie flat. He denies chest pain, palpitations, lightheadedness. Denies cough, wheezing, fevers, or sputum production. Denies leg swelling or increased abdominal girth.     He has been admitted multiple times this year for both cardiac and renal issues:  April 2017: CHF exacerbation with EF found to be 20-25% (decreased from 40-45% in November).   June 2017: Chest pain r/o. Unable to get angio due to impending need for dialysis  November 2017: SOB. Had been unable to get peritoneal dialysis for day.   Jan 2018: peritonitis and influenza. PD catheter removed and pt started on HD. Tx: ceftaz, flagyl, fluconazole.     He was discharged on a good cardiac regimen but had been hypotensive at dialysis. Many of his cardiac meds were discontinued or decreased intentionally but also there was some confusion over which ones he should stop. He was seen in cardiology clinic yesterday for his SOB and an ECHO was ordered. The ECHO revealed an EF of 10 - 15% and so he was sent to our ED.            Review of Systems:     Comprehensive Review of Systems negative except otherwise  noted in HPI.          Past Medical History:       Past Medical History:   Diagnosis Date     (HFpEF) heart failure with preserved ejection fraction (H)      Allergic rhinitis, cause unspecified      Anemia of chronic kidney failure       Baseline hgb about 9      Ascending aortic aneurysm (H)       4.2 cm       Bicuspid aortic valve      CAD (coronary artery disease)      Chronic kidney disease, stage 5 (H)      Congestive heart failure, unspecified      Dyslipidemia      Esophageal reflux      Hypersomnia with sleep apnea, unspecified      Hypertension      MGUS (monoclonal gammopathy of unknown significance)      SHEELA (obstructive sleep apnea)      Systolic heart failure (H)      Type 2 diabetes mellitus (H)              Past Surgical History:   Surgical History reviewed.   Past Surgical History:   Procedure Laterality Date     LAPAROSCOPIC HERNIORRHAPHY INGUINAL BILATERAL Bilateral 2015    Procedure: LAPAROSCOPIC HERNIORRHAPHY INGUINAL BILATERAL;  Surgeon: Bobby Mcconnell MD;  Location: [facility] OR     LAPAROSCOPIC INSERTION CATHETER PERITONEAL DIALYSIS N/A 2017    Procedure: LAPAROSCOPIC INSERTION CATHETER PERITONEAL DIALYSIS;  Laparoscopic Peritoneal Dialysis Catheter Placement - Anesthesia with block;  Surgeon: Esteban Arvizu MD;  Location: [facility] OR     REMOVE CATHETER PERITONEAL Right 1/15/2018    Procedure: REMOVE CATHETER PERITONEAL;  Open Removal of Peritoneal Dialysis Catheter ;  Surgeon: Esteban Arvizu MD;  Location: [facility] OR             Social History:   Social History reviewed.   Social History   Substance Use Topics     Smoking status: Former Smoker     Packs/day: 1.00     Years: 19.00     Types: Cigarettes     Quit date: 1994     Smokeless tobacco: Never Used     Alcohol use No             Family History:   Family History reviewed.  Family History   Problem Relation Age of Onset     C.A.D. Father       from-never knew father-age 60     DIABETES Father      CEREBROVASCULAR DISEASE  "Father      Hypertension No family hx of      Breast Cancer No family hx of      Cancer - colorectal No family hx of      Prostate Cancer No family hx of      KIDNEY DISEASE No family hx of              Allergies:     Allergies   Allergen Reactions     Norco [Hydrocodone-Acetaminophen] Nausea and Vomiting     Cats      Throat tightness     Penicillins Hives     Seasonal Allergies      rhinitis     Shrimp      Throat closes              Medications:   Medications Reviewed.     Patient taking ASA, atorva, omeprazole, prednisone 5 mg qday.     Was told to stop spironolactone, hydralazine, amlodipine. Accidentally stopped imdur (confusing it with amlodipine), and metoprolol.         Physical Exam:   Vitals were reviewed.  Blood pressure 100/71, temperature 98.3  F (36.8  C), temperature source Oral, resp. rate 15, height 1.753 m (5' 9\"), SpO2 100 %.    General: AAOx3, NAD  Skin: Not jaundiced, no rash, no ecchymoses  HEENT: MMM, PERRLA, EOM intact  CV: RRR, normal S1S2, no murmur, clicks, rubs. No JVD.  Resp: Mild bibasilar crackles, no wheezes, comfortable on RA  Abd: Soft, non-tender, BS+, no masses appreciated, well crusting scars with no signs of infection in lower abdomen  Extremities: warm and well perfused, palpable pulses, no edema  Neuro: No lateralizing symptoms or focal neurologic deficits           Data:        ROUTINE LABS (Last four results)  CMP  Recent Labs  Lab 02/02/18  1736      POTASSIUM 4.4   CHLORIDE 106   CO2 22   ANIONGAP 13   *   BUN 30   CR 5.58*   GFRESTIMATED 10*   GFRESTBLACK 13*   TRINI 8.2*   PROTTOTAL 6.2*   ALBUMIN 2.1*   BILITOTAL 0.4   ALKPHOS 86   AST 20   ALT 16     CBC  Recent Labs  Lab 02/02/18  1736   WBC 7.4   RBC 2.91*   HGB 9.1*   HCT 29.2*      MCH 31.3   MCHC 31.2*   RDW 19.7*        Lactic acid 1.9  BNP 50,000  Albumin 2.1  Trop 0.24  D-dimer 3.3    VBG 7.49/35/32/27    CXR: improving right lower lung field opacities    ECHO " 2/2/2017:    Interpretation Summary  The Ejection Fraction is estimated at 10-15%. Severe left ventricular dilation  is present.  Global right ventricular function is moderately reduced.  Moderate mitral insufficiency is present.  Mild tricuspid insufficiency is present.  The aortic valve is bicuspid. Moderate aortic insufficiency is present.  Right ventricular systolic pressure is 31mmHg above the right atrial pressure.  The inferior vena cava was normal in size with preserved respiratory  variability. Estimated mean right atrial pressure is 3 mmHg.  The sinotubular ridge is effaced. Ascending aorta is dilated at 4.2 cm.  This study was compared with the study from 4/10/2017. Aortic regurgitation,  LV, RV function and LV dilatation worsened.     Patient experienced shortness of breath during the exam and he is sent to the  ED for further evaluation.    CARDIOLOGY STAFF NOTE  I have seen and examined the patient with the Cards 1 team. I agree with the note above that reflects my assessment and plan of care. Patient seen on 2/3/18. Please refer to progress note dated 2/3/18 for details.  Deandre Muñoz MD [facility]  Cardiology - Electrophysiology

## 2018-02-03 NOTE — ED PROVIDER NOTES
History     Chief Complaint   Patient presents with     Shortness of Breath     HPI  Murray Nicholson is a 63-year-old male who presents to emergency department with complaints of increasing shortness of breath.  Patient 1 year ago had a echocardiogram that revealed a ejection fraction of 20-25%.  He was a peritoneal dialysis patient at that time and subsequently went on over the next subsequent months to develop peritonitis requiring IV antibiotics and never underwent an evaluation to figure out why he had his cardiomyopathy as far as doing angiography.  Angiography had been scheduled for last April but was never completed.  The patient continued continued to have complications with his peritoneal dialysis and most recently was switched to hemodialysis and now has a catheter in his right anterior superior chest for hemodialysis.  Patient states his last hemodialysis was on Thursday, or yesterday.  Patient is uncertain about what his dry weight is because with peritoneal dialysis he would weigh in the 190s and now weighs around 175.    This part of the document was transcribed by Case Duggan Scribjorge.      Past Medical History:   Diagnosis Date     (HFpEF) heart failure with preserved ejection fraction (H)      Allergic rhinitis, cause unspecified      Anemia of chronic kidney failure      Ascending aortic aneurysm (H)      Bicuspid aortic valve      CAD (coronary artery disease)      Chronic kidney disease, stage 5 (H)      Congestive heart failure, unspecified      Dyslipidemia      Esophageal reflux      Hypersomnia with sleep apnea, unspecified      Hypertension      MGUS (monoclonal gammopathy of unknown significance)      SHEELA (obstructive sleep apnea)      Systolic heart failure (H)      Type 2 diabetes mellitus (H)        Past Surgical History:   Procedure Laterality Date     LAPAROSCOPIC HERNIORRHAPHY INGUINAL BILATERAL Bilateral 7/24/2015    Procedure: LAPAROSCOPIC HERNIORRHAPHY INGUINAL BILATERAL;   Surgeon: Bobby Mcconnell MD;  Location: [facility] OR     LAPAROSCOPIC INSERTION CATHETER PERITONEAL DIALYSIS N/A 2017    Procedure: LAPAROSCOPIC INSERTION CATHETER PERITONEAL DIALYSIS;  Laparoscopic Peritoneal Dialysis Catheter Placement - Anesthesia with block;  Surgeon: Esteban Arvizu MD;  Location: [facility] OR     REMOVE CATHETER PERITONEAL Right 1/15/2018    Procedure: REMOVE CATHETER PERITONEAL;  Open Removal of Peritoneal Dialysis Catheter ;  Surgeon: Esteban Arvizu MD;  Location: [facility] OR       Family History   Problem Relation Age of Onset     C.A.D. Father       from-never knew father-age 60     DIABETES Father      CEREBROVASCULAR DISEASE Father      Hypertension No family hx of      Breast Cancer No family hx of      Cancer - colorectal No family hx of      Prostate Cancer No family hx of      KIDNEY DISEASE No family hx of        Social History   Substance Use Topics     Smoking status: Former Smoker     Packs/day: 1.00     Years: 19.00     Types: Cigarettes     Quit date: 1994     Smokeless tobacco: Never Used     Alcohol use No       No current facility-administered medications for this encounter.      Current Outpatient Prescriptions   Medication     losartan (COZAAR) 25 MG tablet     metoprolol succinate (TOPROL-XL) 25 MG 24 hr tablet     traMADol (ULTRAM) 50 MG tablet     order for DME     gentamicin (GARAMYCIN) 0.1 % cream     B Complex-Biotin-FA (B-COMPLEX PO)     ACETAMINOPHEN PO     bismuth subsalicylate (PEPTO BISMOL) 262 MG/15ML suspension     torsemide (DEMADEX) 100 MG tablet     calcium acetate, Phos Binder, 667 MG TABS     predniSONE (DELTASONE) 5 MG tablet     predniSONE (DELTASONE) 20 MG tablet     allopurinol (ZYLOPRIM) 100 MG tablet     allopurinol (ZYLOPRIM) 300 MG tablet     albuterol (PROAIR HFA/PROVENTIL HFA/VENTOLIN HFA) 108 (90 BASE) MCG/ACT Inhaler     fluticasone (FLONASE) 50 MCG/ACT spray     atorvastatin (LIPITOR) 40 MG tablet     isosorbide mononitrate (IMDUR) 60 MG 24  "hr tablet     omeprazole (PRILOSEC) 20 MG CR capsule     loratadine (CLARITIN) 10 MG tablet     ASPIRIN 81 MG OR TABS        Allergies   Allergen Reactions     Norco [Hydrocodone-Acetaminophen] Nausea and Vomiting     Cats      Throat tightness     Penicillins Hives     Seasonal Allergies      rhinitis     Shrimp      Throat closes          I have reviewed the Medications, Allergies, Past Medical and Surgical History, and Social History in the Epic system.    Review of Systems   All other systems reviewed and are negative.      Physical Exam   BP: 107/76  Heart Rate: 112  Temp: 98.3  F (36.8  C)  Resp: 22  Height: 175.3 cm (5' 9\")  SpO2: 100 %      Physical Exam   Constitutional: He is oriented to person, place, and time.   Alert conversant and sitting upright   HENT:   Head: Atraumatic.   Eyes: EOM are normal. Pupils are equal, round, and reactive to light.   Neck: Neck supple. No JVD present.   Cardiovascular: Regular rhythm.    Pulmonary/Chest: He has no wheezes. He has no rales.   Abdominal: Soft. There is no tenderness.   Musculoskeletal: He exhibits no edema, tenderness or deformity.   Neurological: He is alert and oriented to person, place, and time. No cranial nerve deficit.   Skin: No rash noted.   Psychiatric: He has a normal mood and affect.       ED Course     ED Course     Procedures          Patient had echocardiogram done earlier today in the clinic:    [facility]  Diagnostic and Treamtent-3rd Floor  [address] [place] [id number]     Name: SANCHEZ MCNEIL  MRN: [id number]  : 1955  Study Date: 2018 02:50 PM  Age: 63 yrs  Gender: Male  Patient [place] [facility] [place]  Reason For Study: , Bicuspid aortic valve, Hyperlipidemia LDL goal <100,  Coronary  Ordering Physician: DIONNA OROZCO  Referring Physician: DIONNA OROZCO  Performed By: Monroe Snow RDCS     BSA: 2.0 m2  Height: 69 in  Weight: 176 lb  BP: 97/65 " mmHg  _____________________________________________________________________________  __        Procedure  Echocardiogram with two-dimensional, color and spectral Doppler performed.  Contrast Optison. Optison (NDC #1302-5405-00) given intravenously. Patient was  given 6 ml mixture of 3 ml Optison and 6 ml saline. 3 ml wasted.  _____________________________________________________________________________  __        Interpretation Summary  The Ejection Fraction is estimated at 10-15%. Severe left ventricular dilation  is present.  Global right ventricular function is moderately reduced.  Moderate mitral insufficiency is present.  Mild tricuspid insufficiency is present.  The aortic valve is bicuspid. Moderate aortic insufficiency is present.  Right ventricular systolic pressure is 31mmHg above the right atrial pressure.  The inferior vena cava was normal in size with preserved respiratory  variability. Estimated mean right atrial pressure is 3 mmHg.  The sinotubular ridge is effaced. Ascending aorta is dilated at 4.2 cm.  This study was compared with the study from 4/10/2017. Aortic regurgitation,  LV, RV function and LV dilatation worsened.     Patient experienced shortness of breath during the exam and he is sent to the  ED for further evaluation.     _____________________________________________________________________________  __        Left Ventricle  Severe left ventricular dilation is present. Severely abnormal eccentric  hypertrophy. The Ejection Fraction is estimated at 10-15%.     Right Ventricle  The right ventricle is normal size. Global right ventricular function is  moderately reduced.     Atria  Mild right atrial enlargement is present. Severe left atrial enlargement is  present.     Mitral Valve  Mild mitral annular calcification is present. Moderate mitral insufficiency is  present. The effective regurgitant orifice area is _ cm^2. MR volume is 26   ml.        Aortic Valve  Mild aortic valve sclerosis  is present. The aortic valve is bicuspid. Moderate  aortic insufficiency is present. Vena contracta is 4.5 mm. PHT is 159 ms.  Patient is tachycardic at 111 bpm. AI ERO is 0.29 cm2.     Tricuspid Valve  Mild tricuspid insufficiency is present. The right ventricular systolic  pressure is approximated at 31.2 mmHg plus the right atrial pressure. Right  ventricular systolic pressure is 31mmHg above the right atrial pressure.     Pulmonic Valve  The pulmonic valve is normal. Trace pulmonic insufficiency is present.     Vessels  The inferior vena cava was normal in size with preserved respiratory  variability. Ascending aorta 4.2 cm. The sinotubular ridge is effaced.  Estimated mean right atrial pressure is 3 mmHg.     Pericardium  No pericardial effusion is present.        Compared to Previous Study  This study was compared with the study from 4/10/2017 . Aortic regurgitation  and LV function worsened. LV became severely dilated.  _____________________________________________________________________________  __  MMode/2D Measurements & Calculations     IVSd: 0.69 cm  LVIDd: 7.9 cm  LVIDs: 7.4 cm  LVPWd: 0.93 cm  FS: 5.8 %  EDV(Teich): 333.3 ml  ESV(Teich): 291.3 ml  LV mass(C)d: 308.9 grams  LV mass(C)dI: 157.9 grams/m2  asc Aorta Diam: 4.2 cm  LVOT diam: 2.3 cm  LVOT area: 4.2 cm2  LA Volume (BP): 115.0 ml  LA Volume Index (BP): 58.7 ml/m2  RWT: 0.24           Doppler Measurements & Calculations  MV E max blake: 118.0 cm/sec  MV dec time: 0.10 sec  AI P1/2t: 134.8 msec  TR max blake: 279.1 cm/sec  TR max P.2 mmHg     _____________________________________________________________________________  __           Report approved by: Sapphire ALCARAZ 2018 05:39 PM        Patient was placed on cardiac monitor and oximetry here in the ER.    IV established and blood drawn for labs.    EKG revealed a sinus tach at a rate of 112 with a leftward axis and T-wave inversions across the lateral chest leads.  This is unchanged  from a previous EKG done 2 months ago.    Results for orders placed or performed during the hospital encounter of 02/02/18 (from the past 24 hour(s))   EKG 12 lead   Result Value Ref Range    Interpretation ECG Click View Image link to view waveform and result    Nt probnp inpatient   Result Value Ref Range    N-Terminal Pro BNP Inpatient 95073 (H) 0 - 900 pg/mL   CBC with platelets differential   Result Value Ref Range    WBC 7.4 4.0 - 11.0 10e9/L    RBC Count 2.91 (L) 4.4 - 5.9 10e12/L    Hemoglobin 9.1 (L) 13.3 - 17.7 g/dL    Hematocrit 29.2 (L) 40.0 - 53.0 %     78 - 100 fl    MCH 31.3 26.5 - 33.0 pg    MCHC 31.2 (L) 31.5 - 36.5 g/dL    RDW 19.7 (H) 10.0 - 15.0 %    Platelet Count 229 150 - 450 10e9/L    Diff Method Automated Method     % Neutrophils 76.0 %    % Lymphocytes 12.9 %    % Monocytes 5.0 %    % Eosinophils 4.3 %    % Basophils 0.7 %    % Immature Granulocytes 1.1 %    Nucleated RBCs 1 (H) 0 /100    Absolute Neutrophil 5.6 1.6 - 8.3 10e9/L    Absolute Lymphocytes 1.0 0.8 - 5.3 10e9/L    Absolute Monocytes 0.4 0.0 - 1.3 10e9/L    Absolute Eosinophils 0.3 0.0 - 0.7 10e9/L    Absolute Basophils 0.1 0.0 - 0.2 10e9/L    Abs Immature Granulocytes 0.1 0 - 0.4 10e9/L    Absolute Nucleated RBC 0.1    Comprehensive metabolic panel   Result Value Ref Range    Sodium 141 133 - 144 mmol/L    Potassium 4.4 3.4 - 5.3 mmol/L    Chloride 106 94 - 109 mmol/L    Carbon Dioxide 22 20 - 32 mmol/L    Anion Gap 13 3 - 14 mmol/L    Glucose 240 (H) 70 - 99 mg/dL    Urea Nitrogen 30 7 - 30 mg/dL    Creatinine 5.58 (H) 0.66 - 1.25 mg/dL    GFR Estimate 10 (L) >60 mL/min/1.7m2    GFR Estimate If Black 13 (L) >60 mL/min/1.7m2    Calcium 8.2 (L) 8.5 - 10.1 mg/dL    Bilirubin Total 0.4 0.2 - 1.3 mg/dL    Albumin 2.1 (L) 3.4 - 5.0 g/dL    Protein Total 6.2 (L) 6.8 - 8.8 g/dL    Alkaline Phosphatase 86 40 - 150 U/L    ALT 16 0 - 70 U/L    AST 20 0 - 45 U/L   D dimer quantitative   Result Value Ref Range    D Dimer 3.3 (H) 0.0  - 0.50 ug/ml FEU   Troponin I   Result Value Ref Range    Troponin I ES 0.247 (HH) 0.000 - 0.045 ug/L   ISTAT gases lactate peter POCT   Result Value Ref Range    Ph Venous 7.49 (H) 7.32 - 7.43 pH    PCO2 Venous 35 (L) 40 - 50 mm Hg    PO2 Venous 32 25 - 47 mm Hg    Bicarbonate Venous 27 21 - 28 mmol/L    O2 Sat Venous 68 %    Lactic Acid 1.9 0.7 - 2.1 mmol/L   Chest XR,  PA & LAT    Narrative    Preliminary     Impression    impression: Decrease hazy airspace opacities compared with 1/12/2018,  now predominantly in the right lower lung field. Findings likely  represent a resolving infection, although a new infection cannot be  excluded.       Labs Ordered and Resulted from Time of ED Arrival Up to the Time of Departure from the ED   NT PROBNP INPATIENT - Abnormal; Notable for the following:        Result Value    N-Terminal Pro BNP Inpatient 62339 (*)     All other components within normal limits   CBC WITH PLATELETS DIFFERENTIAL - Abnormal; Notable for the following:     RBC Count 2.91 (*)     Hemoglobin 9.1 (*)     Hematocrit 29.2 (*)     MCHC 31.2 (*)     RDW 19.7 (*)     Nucleated RBCs 1 (*)     All other components within normal limits   COMPREHENSIVE METABOLIC PANEL - Abnormal; Notable for the following:     Glucose 240 (*)     Creatinine 5.58 (*)     GFR Estimate 10 (*)     GFR Estimate If Black 13 (*)     Calcium 8.2 (*)     Albumin 2.1 (*)     Protein Total 6.2 (*)     All other components within normal limits   D DIMER QUANTITATIVE - Abnormal; Notable for the following:     D Dimer 3.3 (*)     All other components within normal limits   TROPONIN I - Abnormal; Notable for the following:     Troponin I ES 0.247 (*)     All other components within normal limits   ISTAT  GASES LACTATE PETER POCT - Abnormal; Notable for the following:     Ph Venous 7.49 (*)     PCO2 Venous 35 (*)     All other components within normal limits   ISTAT CG4 GASES LACTATE PETER NURSING POCT   PULSE OXIMETRY NURSING   PERIPHERAL IV  CATHETER   CARDIAC CONTINUOUS MONITORING         Assessments & Plan (with Medical Decision Making)     I have reviewed the nursing notes.    Patient's troponin is probably at baseline for him with his history of dialysis and his d-dimer is probably unreliable because of his recent hemodialysis catheter placement.  Patient seems most likely to have progressive cardiomyopathy although a PE cannot be completely ruled out.  Case was discussed with the heart failure service as well as CSI and cardiology 1.  At this time the patient will be admitted to [place] [id number] with nephrology in consult.    I have reviewed the findings, diagnosis, and plan with the patient.    Final diagnoses:   MEADE (dyspnea on exertion) - moderate to severe with worsening cardiomyopathy (EF now 10-15%)   Acute on chronic systolic heart failure (H)     Kwaku Tran MD    2/2/2018   [facility], [place], EMERGENCY DEPARTMENT     Kwaku Tran MD  02/02/18 1928
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
## 2018-02-03 NOTE — PLAN OF CARE
Problem: Patient Care Overview  Goal: Plan of Care/Patient Progress Review    D/I/A: Pt admitted yesterday for newly decreased EF and volume overload. NPO this am for possible angio and right heart cath today; nephrology would also like pt to undergo a dialysis run today. Pt went to cath lab in early afternoon, only right heart cath done d/t elevated numbers/results and the need for dialysis. Pt went to dialysis soon after returning to unit from cath lab; confirmed with dialysis nurse that she felt comfortable enforcing flat bedrest x2 hours prior to pt transferring. Pt left for dialysis ~1500.      P: Continue with current plan of care; pt will still need coronary angiogram. For questions or concerns, contact Cards 2 (team received patient from Cards 1 this afternoon after findings from right heart cath).      Alma Rosa Duenas, TONY  Cardiology

## 2018-02-04 LAB
ALBUMIN UR-MCNC: ABNORMAL MG/DL
ANION GAP SERPL CALCULATED.3IONS-SCNC: 10 MMOL/L (ref 3–14)
APPEARANCE UR: ABNORMAL
BILIRUB UR QL STRIP: ABNORMAL
BUN SERPL-MCNC: 25 MG/DL (ref 7–30)
CALCIUM SERPL-MCNC: 8.8 MG/DL (ref 8.5–10.1)
CHLORIDE SERPL-SCNC: 101 MMOL/L (ref 94–109)
CO2 SERPL-SCNC: 24 MMOL/L (ref 20–32)
COLOR UR AUTO: ABNORMAL
CREAT SERPL-MCNC: 4.55 MG/DL (ref 0.66–1.25)
GFR SERPL CREATININE-BSD FRML MDRD: 13 ML/MIN/1.7M2
GLUCOSE BLDC GLUCOMTR-MCNC: 133 MG/DL (ref 70–99)
GLUCOSE BLDC GLUCOMTR-MCNC: 210 MG/DL (ref 70–99)
GLUCOSE BLDC GLUCOMTR-MCNC: 239 MG/DL (ref 70–99)
GLUCOSE BLDC GLUCOMTR-MCNC: 323 MG/DL (ref 70–99)
GLUCOSE SERPL-MCNC: 127 MG/DL (ref 70–99)
GLUCOSE UR STRIP-MCNC: ABNORMAL MG/DL
HGB UR QL STRIP: ABNORMAL
KETONES UR STRIP-MCNC: ABNORMAL MG/DL
LEUKOCYTE ESTERASE UR QL STRIP: ABNORMAL
MAGNESIUM SERPL-MCNC: 1.4 MG/DL (ref 1.6–2.3)
MAGNESIUM SERPL-MCNC: 1.9 MG/DL (ref 1.6–2.3)
NITRATE UR QL: ABNORMAL
PH UR STRIP: ABNORMAL PH (ref 5–7)
POTASSIUM SERPL-SCNC: 4.2 MMOL/L (ref 3.4–5.3)
RBC #/AREA URNS AUTO: ABNORMAL /HPF (ref 0–2)
SODIUM SERPL-SCNC: 135 MMOL/L (ref 133–144)
SOURCE: ABNORMAL
SP GR UR STRIP: ABNORMAL (ref 1–1.03)
UROBILINOGEN UR STRIP-MCNC: ABNORMAL MG/DL (ref 0–2)
WBC #/AREA URNS AUTO: ABNORMAL /HPF

## 2018-02-04 PROCEDURE — 99233 SBSQ HOSP IP/OBS HIGH 50: CPT | Mod: GC | Performed by: INTERNAL MEDICINE

## 2018-02-04 PROCEDURE — 00000146 ZZHCL STATISTIC GLUCOSE BY METER IP

## 2018-02-04 PROCEDURE — 83735 ASSAY OF MAGNESIUM: CPT | Performed by: INTERNAL MEDICINE

## 2018-02-04 PROCEDURE — 40000141 ZZH STATISTIC PERIPHERAL IV START W/O US GUIDANCE

## 2018-02-04 PROCEDURE — 25000132 ZZH RX MED GY IP 250 OP 250 PS 637: Performed by: STUDENT IN AN ORGANIZED HEALTH CARE EDUCATION/TRAINING PROGRAM

## 2018-02-04 PROCEDURE — 25000128 H RX IP 250 OP 636: Performed by: INTERNAL MEDICINE

## 2018-02-04 PROCEDURE — 80048 BASIC METABOLIC PNL TOTAL CA: CPT | Performed by: INTERNAL MEDICINE

## 2018-02-04 PROCEDURE — 25000132 ZZH RX MED GY IP 250 OP 250 PS 637: Performed by: INTERNAL MEDICINE

## 2018-02-04 PROCEDURE — 36415 COLL VENOUS BLD VENIPUNCTURE: CPT | Performed by: INTERNAL MEDICINE

## 2018-02-04 PROCEDURE — 25000125 ZZHC RX 250: Performed by: INTERNAL MEDICINE

## 2018-02-04 PROCEDURE — 21400006 ZZH R&B CCU INTERMEDIATE UMMC

## 2018-02-04 PROCEDURE — 40000802 ZZH SITE CHECK

## 2018-02-04 RX ORDER — MAGNESIUM SULFATE 1 G/100ML
1 INJECTION INTRAVENOUS ONCE
Status: COMPLETED | OUTPATIENT
Start: 2018-02-04 | End: 2018-02-04

## 2018-02-04 RX ADMIN — ALLOPURINOL 400 MG: 300 TABLET ORAL at 08:51

## 2018-02-04 RX ADMIN — HEPARIN SODIUM 5000 UNITS: 5000 INJECTION, SOLUTION INTRAVENOUS; SUBCUTANEOUS at 10:11

## 2018-02-04 RX ADMIN — HEPARIN SODIUM 5000 UNITS: 5000 INJECTION, SOLUTION INTRAVENOUS; SUBCUTANEOUS at 21:38

## 2018-02-04 RX ADMIN — ASPIRIN 81 MG CHEWABLE TABLET 81 MG: 81 TABLET CHEWABLE at 08:50

## 2018-02-04 RX ADMIN — ALBUTEROL SULFATE 2 PUFF: 90 AEROSOL, METERED RESPIRATORY (INHALATION) at 08:50

## 2018-02-04 RX ADMIN — PREDNISONE 5 MG: 5 TABLET ORAL at 08:51

## 2018-02-04 RX ADMIN — TRAMADOL HYDROCHLORIDE 50 MG: 50 TABLET, COATED ORAL at 21:45

## 2018-02-04 RX ADMIN — CALCIUM ACETATE 667 MG: 667 CAPSULE ORAL at 17:44

## 2018-02-04 RX ADMIN — OMEPRAZOLE 20 MG: 20 CAPSULE, DELAYED RELEASE ORAL at 21:38

## 2018-02-04 RX ADMIN — CALCIUM ACETATE 667 MG: 667 CAPSULE ORAL at 10:11

## 2018-02-04 RX ADMIN — ATORVASTATIN CALCIUM 40 MG: 40 TABLET, FILM COATED ORAL at 08:50

## 2018-02-04 RX ADMIN — MAGNESIUM SULFATE IN DEXTROSE 1 G: 10 INJECTION, SOLUTION INTRAVENOUS at 10:11

## 2018-02-04 RX ADMIN — CALCIUM ACETATE 667 MG: 667 CAPSULE ORAL at 13:10

## 2018-02-04 ASSESSMENT — PAIN DESCRIPTION - DESCRIPTORS: DESCRIPTORS: ACHING

## 2018-02-04 NOTE — PROGRESS NOTES
Cards 2 Progress Note     Date of Admission: 2/2/2018  Hospital Day #: 2   Date of Service (when I saw the patient): 02/04/2018     Assessment & Plan      Murray Nicholson is a 63 year old male with a history of non-obstructive CAD, HFrEF due to ICM EF 10-15%, functionally bicuspid AV with ascending aortic aneurysm, ESRD on HD, HTN, T2DM who presented with worsening MEADE & orthopnea in the setting of worsening EF.    Acute on Chronic HFrEF due to ICM (EF 10-15%)   Patient's LVEF decreased from 40-45% on Nov 2016 to 20-25% in April 2017 and now 10-15%. Thought that patient's valvular disease was not severe enough to cause this reduction in systolic function, but he has been unable to have repeat angiogram due to CKD, he is now on hemodialysis. Last angiogram was 2006 with 60-70% LAD lesion in dLAD (FFR 0.85) and 30% pRCA, right dominant. Unfortunately he has been unable to tolerate HF medications recently due to hypotension during dialysis. Admitted with worsening MEADE and orthopnea concerning for hypervolemia, RHC on 2/3 with elevated elevated right (RV 56/18) and left-sided (PCWP 45) pressures. Cause of acute decompensation is likely inability to remove adequate fluid at dialysis due to hypotension (only removing ~ 1.6-1.7 L three times weekly).  - Nephrology consulted for dialysis    --> Planning to dialyze both Mon 2/5 and Tues 2/6, goal to remove 3-4L    --> Strict Is & Os, daily weights, 2L fluid restriction  - BB: Holding due to acute decompensation  - ACEi/ARB: Holding due to hypotension  - Afterload: Holding home hydralazine due to hypotension  - Aldosterone antagonist: ESRD  - CAD:   > Aspirin 81 mg daily   > Atorvastatin 40 mg daily   > Will need angiogram once volume status improved    ESRD on HD  Patient was previously on PD since summer 2017, developed peritonitis in Jan 2017 and switched to HD (T-Th-Sat). ESRD is likely secondary to HTN and T2DM. Has tunneled catheter for his access.   - Nephrology consult,  "appreciate recs    T2DM  - Hold home glipizide  - Low intensity SSI  - Hypoglycemia protocol    Gout  - Continue PTA prednisone 5 mg daily  - Continue PTA allopurinol 400 mg daily    Normocytic anemia  Hgb stable around 9-10g/dL. Likely secondary to ESRD.  - Continue Epogen 4000 units Q3 weeks (will need to clarify when last taken)    FEN  -   Active Diet Order      Low Saturated Fat Na <2400 mg, 2L fluid restriction  - PRN lytes replacement    Prophy/Misc: VTE:  Heparin SQ  Lines: HD line, PIV  Code status: Full Code  Disposition: Expected discharge: 3-4 days, recommended to prior living arrangement once improved volume status, coronary angiogram.    Patient seen and discussed with Dr. Madhu Muse, who agrees with the above assessment and plan.    Mellisa Brunner  Internal Medicine, PGY3  [phone]  _____________________________________________________________________________  Interval History     No acute events overnight. Patient was dialyzed yesterday with removal of 2.3kg. Goal was 3kg but patient developed shortness of breath and there was a large volume change per critline so UF was decreased. Mr. Nicholson continues to report dyspnea with minimal exertion. He is surprised that his weight has decreased so much because his prior dry weight was around 180 lbs.     -------------------------------------------------------------------------------------------------------------------------------------    Physical Exam   /65 (BP Location: Left arm)  Temp 97.8  F (36.6  C) (Oral)  Resp 20  Ht 1.753 m (5' 9\")  Wt 78.8 kg (173 lb 12.8 oz)  SpO2 100%  BMI 25.67 kg/m2  I/O last 3 completed shifts:  In: 120 [P.O.:120]  Out: 2300 [Other:2300]    Constitutional: Middle-aged man, resting in bed comfortably, NAD  Respiratory: CTAB over anterior lung fields, no wheezing or crackles, wearing O2 NC  Cardiovascular: Tachycardic, regular rhythm, no murmurs or rubs appreciated, JVD at base of neck  GI: Soft, non-tender, " non-distended, no rebound or guarding  Skin: Warm and dry, no concerning lesions or rash on exposed surfaces.  Extremities: No LE edema, warm and well-perfused  Neuro: CN II-XII grossly intact, no focal deficits on limited exam     Medications      *Cardiac medications: aspirin 81mg daily, atorvastatin 40mg daily    Data      CBC    Recent Labs  Lab 02/03/18  0613 02/02/18  2213 02/02/18  1736   WBC 8.4  --  7.4   RBC 3.06*  --  2.91*   HGB 9.5*  --  9.1*   HCT 31.1*  --  29.2*   *  --  100   MCH 31.0  --  31.3   MCHC 30.5*  --  31.2*   RDW 20.1*  --  19.7*    263 229     CMP    Recent Labs  Lab 02/04/18  0654 02/03/18  0613 02/02/18  1736    142 141   POTASSIUM 4.2 4.4 4.4   CHLORIDE 101 107 106   CO2 24 25 22   ANIONGAP 10 10 13   * 121* 240*   BUN 25 37* 30   CR 4.55* 6.41* 5.58*   GFRESTIMATED 13* 9* 10*   GFRESTBLACK 16* 11* 13*   TRINI 8.8 8.1* 8.2*   MAG 1.4*  --   --    PROTTOTAL  --   --  6.2*   ALBUMIN  --   --  2.1*   BILITOTAL  --   --  0.4   ALKPHOS  --   --  86   AST  --   --  20   ALT  --   --  16     INRNo lab results found in last 7 days.     Imaging:     No new imaging studies today.    I have reviewed today's vital signs, notes, medications, labs and imaging.  I have also seen and examined the patient and agree with the findings and plan as outlined above.  Pt without complaints.  VSS with lungs with bibasilar rales and S1 and S2 with II/VI HSM.  JVP more than 16 cm.  Labs as above.  Assessment: Pt with endstage heart failure with LVEDP 45, hypervolemic state, ESRD will need strict Is and Os with 2L fluid restriction and probably daily HD with removal of 3L per day.     Bobby Muse MD, PhD  Professor, Heart Failure and Cardiac Transplantation  [facility]

## 2018-02-04 NOTE — PLAN OF CARE
Problem: Patient Care Overview  Goal: Plan of Care/Patient Progress Review  Outcome: No Change  D: Heart failure. Fluid up.  I/A: VSS. Denies pain. Sinus tach. Returned from dialysis at 2145. Pt has been c/o some SOB and exhaustion all shift. On O2 via nasal cannula at 4 Lpm.   P: Continue to monitor.

## 2018-02-04 NOTE — PROGRESS NOTES
HEMODIALYSIS TREATMENT NOTE    Date: 2/3/2018  Time: 7:08 PM    Data:  Pre Wt:   78.8 kg  Desired Wt:   75.8 kg   Ultrafiltration - Post Run Net Total Removed (mL): 2300 mL  Ultrafiltration - Post Run Net Total Gain (mL): 0 mL  Vascular Access Status: Yes, secured and visible  Dialyzer Rinse: Moderate, Streaked  Total Blood Volume Processed:   75.1 L  Total Dialysis (Treatment) Time:    3.5 hours      Lab:   No    Assessment/Interventions:  3.5 hours of HD via right CVC. 400 BFR throughout. Attempted to pull 3 kg but reduced UF goal 2/2 large volume change per critline and patient feeling SOB. 2.3 kg total removed. BG of 168 @ 1700 - 1 unit of insulin (per parameters) and scheduled Phoslo given with dinner. CVC site care done and dressing changed; saline locked post-treatment. See MAR for medication details. Report given to primary RN on 6C.     Plan:    Per renal team.

## 2018-02-04 NOTE — PROGRESS NOTES
Nephrology Progress Note  02/04/2018         Assessment & Recommendations:   Murray Nicholson is a 63 year old year old male    ESRD: Due to diabetes, he was in peritoneal dialysis which gets complicated with peritonitis and removed last week, started in IHD, his schedule is TTS, right IJ tunnel catheter, 3 hours, estimated dry weight 80 kg, under care of Dr. Mcpherson.  Will resume dialysis as scheduled. Might consider another run of dialysis tomorrow for volume optimization      Blood pressure and volume: Blood pressure low normal, would recommend holding his blood pressure meds during dialysis day, UF 2.2 yesterday     Electrolyte: Potassium 4.2 sodium 142 no issues     Acid-base: Bicarb normal no issues     Anemia: Chronic disease will obtain records from his dialysis unit, remained in the hospital will continue dialysis Epogen 4000units 3xwks     BMD: Ca 8.1, will cont dialysis hecotrol 1mcg 3xwk     HErEF: EF down to 10 to 15% from 20 to 25, left cath not done is right Cath shows   High right sided filling pressures.  Very High left sided filling pressures.  Severe pulmonary artery hypertension  High left ventricular filling pressures.  Reduced cardiac output, 3.16 L/min with index 1.63 L/min/m2    Coronary angiogram deferred given elevated left sided filling pressures.    Recommendations were communicated to primary team via note    Seen and discussed with Dr. Codi Saunders MD   [phone]    Interval History :   In the last 24 hours Murray Nicholson feels fine no acute distress, he still get easily short winded when he walk, he could not tolerate 3 liter of yesterday only 2.2 was pulled.   Review of Systems:   I reviewed the following systems:  GI: good appetite. no nausea or vomiting or diarrhea.   Neuro:  no confusion  Constitutional:  no fever or chills  CV: no dyspnea or edema.  no chest pain.    Physical Exam:   I/O last 3 completed shifts:  In: 120 [P.O.:120]  Out: 2300 [Other:2300]   /65 (BP  "Location: Left arm)  Temp 98.8  F (37.1  C)  Resp 20  Ht 1.753 m (5' 9\")  Wt 78.8 kg (173 lb 12.8 oz)  SpO2 100%  BMI 25.67 kg/m2     GENERAL APPEARANCE: NAD  EYES:  no scleral icterus, pupils equal  HENT: mouth without ulcers or lesions  PULM: lungs clear to auscultation, bilaterally, equal air movement, no clubbing  CV: regular rhythm, normal rate, no rub     -JVD not elevated      -edema none   GI: soft, nottender, nondistended, bowel sounds are present  INTEGUMENT: no cyanosis, no rash  NEURO:  no asterixis   Access RIJ tunneled     Labs:   All labs reviewed by me  Electrolytes/Renal -   Recent Labs   Lab Test  02/04/18   0654  02/03/18   0613  02/02/18   1736   01/13/18   0956  01/12/18   0742   11/15/17   0758  11/14/17   0645   NA  135  142  141   < >  137  137   < >  144  141   POTASSIUM  4.2  4.4  4.4   < >  3.9  3.5   < >  3.2*  3.7   CHLORIDE  101  107  106   < >  97  98   < >  103  103   CO2  24  25  22   < >  32  32   < >  30  27   BUN  25  37*  30   < >  39*  38*   < >  69*  76*   CR  4.55*  6.41*  5.58*   < >  8.52*  8.27*   < >  6.98*  6.85*   GLC  127*  121*  240*   < >  211*  250*   < >  294*  312*   TRINI  8.8  8.1*  8.2*   < >  8.6  8.4*   < >  8.9  8.9   MAG  1.4*   --    --    --   1.8  1.7   < >  1.9  1.8   PHOS   --    --    --    --    --   3.3   --   5.0*  4.3    < > = values in this interval not displayed.       CBC -   Recent Labs   Lab Test  02/03/18   0613  02/02/18   2213  02/02/18   1736  01/26/18   1446   WBC  8.4   --   7.4  10.7   HGB  9.5*   --   9.1*  9.9*   PLT  252  263  229  410       LFTs -   Recent Labs   Lab Test  02/02/18   1736  01/26/18   1446  01/07/18   1655  11/13/17   1709   ALKPHOS  86  95  76  104   BILITOTAL  0.4  0.4  0.4  0.8   ALT  16  16  24  26   AST  20  23  32  22   PROTTOTAL  6.2*   --   6.0*  6.0*   ALBUMIN  2.1*   --   1.8*  2.3*       Iron Panel -   Recent Labs   Lab Test  07/19/17   1306  07/05/17   1204  06/21/17   1058   IRON  46  26*  69 "   [facility]  18  12*  29   [facility]  369  542*  557*         Imaging:  All imaging studies reviewed by me.     Current Medications:    insulin aspart  1-3 Units Subcutaneous TID AC     insulin aspart  1-3 Units Subcutaneous At Bedtime     allopurinol  400 mg Oral Daily     heparin  5,000 Units Subcutaneous Q12H     aspirin  81 mg Oral Daily     atorvastatin  40 mg Oral Daily     calcium acetate  667 mg Oral TID w/meals     omeprazole  20 mg Oral At Bedtime     predniSONE  5 mg Oral Daily       Bouchra Saunders MD

## 2018-02-04 NOTE — PROGRESS NOTES
Social Work: Assessment with Discharge Plan    Patient Name:  Murray Nicholson  :  1955  Age:  63 year old  MRN:  [id number]  Risk/Complexity Score:     Completed assessment with:  Pt, Chart Review    Presenting Information   Reason for Referral:  Discharge plan  Date of Intake:  2018  Referral Source:  Chart Review  Decision Maker:  Pt  Alternate Decision Maker:  Pt's sons, and best friend, Saad Peck  Health Care Directive:  Provided education  Living Situation:  Apartment  Previous Functional Status:  Independent  Patient and family understanding of hospitalization:  Pt shared that he has a good understanding of hospitalization and diagnosis. Unclear of disposition at this time.  Cultural/Language/Spiritual Considerations:  No needs reported during this encounter.  Adjustment to Illness:  Pt expressed that his coping is fair, despite illness becoming more chronic.    Physical Health  Reason for Admission:    1. MEADE (dyspnea on exertion)    2. Acute on chronic systolic heart failure (H)    3. Familial cardiomyopathy (H)      Services Needed/Recommended:  Other:  TBD,pending testing and results    Mental Health/Chemical Dependency  Diagnosis:  None reported by pt  Support/Services in Place:  Efrain BRIDGES  Services Needed/Recommended:  None    Support System  Significant relationship at present time:  Adult sons, and friends  Family of origin is available for support:  Yes  Other support available:  None reported  Gaps in support system:  None  Patient is caregiver to:  None     Provider Information   Primary Care Physician:  Ana Luisa Mcpherson   [phone]   Clinic:  [address] [place] [address] [address] / [facility] [address]      :  Marla BRIDGES    Financial   Income Source:  Pt is currently employed full time however has been off work for medical disability for the past 3 weeks.  Financial Concerns:  Pt shared concern regarding short term disability benefits being approved due to paperwork not  getting complete in timely manner.  Insurance:    Payor/Plan Subscriber Name Rel Member # Group #   [employer] SANCHEZ MCNEIL  [id number]      [address]   [employer] SANCHE[employer] MCNEIL  [id number]      [address]       Discharge Plan   Patient and family discharge goal:  Home vs TBD  Provided education on discharge plan:  YES  Patient agreeable to discharge plan:  TBD  A list of Medicare Certified Facilities was provided to the patient and/or family to encourage patient choice. Patient's choices for facility are:  TBD  Will NH provide Skilled rehabilitation or complex medical:  TBD  General information regarding anticipated insurance coverage and possible out of pocket cost was discussed. Patient and patient's family are aware patient may incur the cost of transportation to the facility, pending insurance payment: NO  Barriers to discharge:  Medical Stability    Discharge Recommendations   Anticipated Disposition:  Home vsTBD  Transportation Needs:  Family:  Pt's sons or best friend will provide transport at time of discharge.  Name of Transportation Company and Phone:  Son or friend    Additional comments    SW met with pt to introduce self and role in consult. At this time disposition is unclear as pt has further testing that needs to be complete. Pt shared that it is his hope to return home upon discharge. Pt lives alone in an apartment. He identified his sons and best friend as sources of support.Will continue to follow for discharge planning and needs.    Tiffanie BorgesDINH  Weekend   Pager:[phone]

## 2018-02-05 LAB
ALBUMIN UR-MCNC: 100 MG/DL
AMORPH CRY #/AREA URNS HPF: ABNORMAL /HPF
ANION GAP SERPL CALCULATED.3IONS-SCNC: 12 MMOL/L (ref 3–14)
APPEARANCE UR: ABNORMAL
BACTERIA #/AREA URNS HPF: ABNORMAL /HPF
BILIRUB UR QL STRIP: ABNORMAL
BUN SERPL-MCNC: 42 MG/DL (ref 7–30)
CALCIUM SERPL-MCNC: 8.8 MG/DL (ref 8.5–10.1)
CHLORIDE SERPL-SCNC: 102 MMOL/L (ref 94–109)
CO2 SERPL-SCNC: 24 MMOL/L (ref 20–32)
COLOR UR AUTO: YELLOW
CREAT SERPL-MCNC: 6.39 MG/DL (ref 0.66–1.25)
GFR SERPL CREATININE-BSD FRML MDRD: 9 ML/MIN/1.7M2
GLUCOSE BLDC GLUCOMTR-MCNC: 168 MG/DL (ref 70–99)
GLUCOSE BLDC GLUCOMTR-MCNC: 169 MG/DL (ref 70–99)
GLUCOSE BLDC GLUCOMTR-MCNC: 217 MG/DL (ref 70–99)
GLUCOSE BLDC GLUCOMTR-MCNC: 232 MG/DL (ref 70–99)
GLUCOSE BLDC GLUCOMTR-MCNC: 235 MG/DL (ref 70–99)
GLUCOSE SERPL-MCNC: 176 MG/DL (ref 70–99)
GLUCOSE UR STRIP-MCNC: NEGATIVE MG/DL
GRAN CASTS #/AREA URNS LPF: 10 /LPF
HGB UR QL STRIP: ABNORMAL
HYALINE CASTS #/AREA URNS LPF: 12 /LPF (ref 0–2)
KETONES UR STRIP-MCNC: NEGATIVE MG/DL
LEUKOCYTE ESTERASE UR QL STRIP: NEGATIVE
MAGNESIUM SERPL-MCNC: 2.4 MG/DL (ref 1.6–2.3)
MUCOUS THREADS #/AREA URNS LPF: PRESENT /LPF
NITRATE UR QL: NEGATIVE
PH UR STRIP: 5.5 PH (ref 5–7)
PLATELET # BLD AUTO: 213 10E9/L (ref 150–450)
POTASSIUM SERPL-SCNC: 4.3 MMOL/L (ref 3.4–5.3)
RBC #/AREA URNS AUTO: 1 /HPF (ref 0–2)
SODIUM SERPL-SCNC: 138 MMOL/L (ref 133–144)
SOURCE: ABNORMAL
SP GR UR STRIP: 1.02 (ref 1–1.03)
SQUAMOUS #/AREA URNS AUTO: <1 /HPF (ref 0–1)
TRANS CELLS #/AREA URNS HPF: <1 /HPF (ref 0–1)
UROBILINOGEN UR STRIP-MCNC: 2 MG/DL (ref 0–2)
WBC #/AREA URNS AUTO: 4 /HPF (ref 0–2)

## 2018-02-05 PROCEDURE — 25000125 ZZHC RX 250: Performed by: STUDENT IN AN ORGANIZED HEALTH CARE EDUCATION/TRAINING PROGRAM

## 2018-02-05 PROCEDURE — 81001 URINALYSIS AUTO W/SCOPE: CPT | Performed by: STUDENT IN AN ORGANIZED HEALTH CARE EDUCATION/TRAINING PROGRAM

## 2018-02-05 PROCEDURE — 25000128 H RX IP 250 OP 636: Performed by: INTERNAL MEDICINE

## 2018-02-05 PROCEDURE — 00000146 ZZHCL STATISTIC GLUCOSE BY METER IP

## 2018-02-05 PROCEDURE — 83735 ASSAY OF MAGNESIUM: CPT | Performed by: INTERNAL MEDICINE

## 2018-02-05 PROCEDURE — 85049 AUTOMATED PLATELET COUNT: CPT | Performed by: EMERGENCY MEDICINE

## 2018-02-05 PROCEDURE — 99233 SBSQ HOSP IP/OBS HIGH 50: CPT | Mod: GC | Performed by: INTERNAL MEDICINE

## 2018-02-05 PROCEDURE — 80048 BASIC METABOLIC PNL TOTAL CA: CPT | Performed by: INTERNAL MEDICINE

## 2018-02-05 PROCEDURE — 21400006 ZZH R&B CCU INTERMEDIATE UMMC

## 2018-02-05 PROCEDURE — 25000132 ZZH RX MED GY IP 250 OP 250 PS 637: Performed by: INTERNAL MEDICINE

## 2018-02-05 PROCEDURE — 90937 HEMODIALYSIS REPEATED EVAL: CPT

## 2018-02-05 PROCEDURE — 25000125 ZZHC RX 250: Performed by: INTERNAL MEDICINE

## 2018-02-05 PROCEDURE — 36415 COLL VENOUS BLD VENIPUNCTURE: CPT | Performed by: EMERGENCY MEDICINE

## 2018-02-05 PROCEDURE — 25000132 ZZH RX MED GY IP 250 OP 250 PS 637: Performed by: STUDENT IN AN ORGANIZED HEALTH CARE EDUCATION/TRAINING PROGRAM

## 2018-02-05 RX ADMIN — TRAMADOL HYDROCHLORIDE 50 MG: 50 TABLET, COATED ORAL at 21:52

## 2018-02-05 RX ADMIN — CALCIUM ACETATE 667 MG: 667 CAPSULE ORAL at 18:29

## 2018-02-05 RX ADMIN — OMEPRAZOLE 20 MG: 20 CAPSULE, DELAYED RELEASE ORAL at 21:52

## 2018-02-05 RX ADMIN — Medication: at 09:58

## 2018-02-05 RX ADMIN — SODIUM CHLORIDE 250 ML: 9 INJECTION, SOLUTION INTRAVENOUS at 09:58

## 2018-02-05 RX ADMIN — HEPARIN SODIUM 5000 UNITS: 5000 INJECTION, SOLUTION INTRAVENOUS; SUBCUTANEOUS at 21:52

## 2018-02-05 RX ADMIN — HEPARIN SODIUM 5000 UNITS: 5000 INJECTION, SOLUTION INTRAVENOUS; SUBCUTANEOUS at 14:12

## 2018-02-05 RX ADMIN — ASPIRIN 81 MG CHEWABLE TABLET 81 MG: 81 TABLET CHEWABLE at 14:11

## 2018-02-05 RX ADMIN — CALCIUM ACETATE 667 MG: 667 CAPSULE ORAL at 14:11

## 2018-02-05 RX ADMIN — ATORVASTATIN CALCIUM 40 MG: 40 TABLET, FILM COATED ORAL at 14:11

## 2018-02-05 RX ADMIN — ALLOPURINOL 400 MG: 300 TABLET ORAL at 08:21

## 2018-02-05 RX ADMIN — CALCIUM ACETATE 667 MG: 667 CAPSULE ORAL at 08:21

## 2018-02-05 RX ADMIN — SODIUM CHLORIDE 300 ML: 9 INJECTION, SOLUTION INTRAVENOUS at 09:57

## 2018-02-05 RX ADMIN — PREDNISONE 5 MG: 5 TABLET ORAL at 08:21

## 2018-02-05 RX ADMIN — Medication 2 G: at 01:03

## 2018-02-05 ASSESSMENT — PAIN DESCRIPTION - DESCRIPTORS: DESCRIPTORS: ACHING

## 2018-02-05 NOTE — PROGRESS NOTES
Nephrology Progress Note  02/05/2018         Assessment & Recommendations:   Murray Nicholson is a 63 year old year old male    End-stage renal disease: Due to diabetes, pain in peritoneal dialysis since August, recently changed to intermittent hemodialysis to schedule this Tuesday Thursday and Saturday, via right IJ tunneled catheter, 3 hours, estimated dry weight 80 kg, and care of ,, patient daily dialysis trying to remove fluid off due to right heart pressure and volume overload.    Blood pressure and volume: Blood pressure is in the low side, his right heart cath shows large volume and increasing pressure bolus to UF daily for volume optimization.  We will continue dialysis daily daily is a new dry weight will be determined in the future.    Electrolyte and acid-base: Potassium 4.3, magnesium improved after replacement now 2.4 before dialysis.  Sodium 138 no issues, bicarb 24.    Anemia: Of chronic disease his dialysis unit record shows the patient 4000 units 3 times a week.  Will start EPO tomorrow.    Bone and mineral disease: Continue Hectorol 1 MCG 3 times a week    HErEF: EF down to 10 to 15% from 20 to 25, left cath not done is right Cath shows   High right sided filling pressures.  Very High left sided filling pressures.  Severe pulmonary artery hypertension  High left ventricular filling pressures.  Reduced cardiac output, 3.16 L/min with index 1.63 L/min/m2    Coronary angiogram deferred given elevated left sided filling pressures.    Recommendations were communicated to primary team via note    Seen and discussed with Dr. Shilpi Saunders MD   [phone]    Interval History :   In the last 24 hours Murray Nicholson feels fine but still with sob and orthopnea.   Review of Systems:   I reviewed the following systems:  GI: good appetite. no nausea or vomiting or diarrhea.   Neuro:  no confusion  Constitutional:  no fever or chills  CV: some dyspnea or edema.  no chest pain.    Physical Exam:  "  I/O last 3 completed shifts:  In: 780 [P.O.:780]  Out: 0    /78  Pulse 115  Temp 97.9  F (36.6  C) (Oral)  Resp 18  Ht 1.753 m (5' 9\")  Wt 76.6 kg (168 lb 12.8 oz)  SpO2 100%  BMI 24.93 kg/m2     GENERAL APPEARANCE: NAD  EYES:  no scleral icterus, pupils equal  HENT: mouth without ulcers or lesions  PULM: lungs Clear to auscultation,  bilaterally, equal air movement, no clubbing  CV: regular rhythm, normal rate, no rub     -JVD not elevated      -edema none    GI: soft, nottender, nopndistended, bowel sounds are present  INTEGUMENT: no cyanosis, no rash  NEURO:  no asterixis   Access RIJ tunnel    Labs:   All labs reviewed by me  Electrolytes/Renal -   Recent Labs   Lab Test  02/05/18   0629  02/04/18   1846  02/04/18   0654  02/03/18   0613   01/12/18   0742   11/15/17   0758  11/14/17   0645   NA  138   --   135  142   < >  137   < >  144  141   POTASSIUM  4.3   --   4.2  4.4   < >  3.5   < >  3.2*  3.7   CHLORIDE  102   --   101  107   < >  98   < >  103  103   CO2  24   --   24  25   < >  32   < >  30  27   BUN  42*   --   25  37*   < >  38*   < >  69*  76*   CR  6.39*   --   4.55*  6.41*   < >  8.27*   < >  6.98*  6.85*   GLC  176*   --   127*  121*   < >  250*   < >  294*  312*   TRINI  8.8   --   8.8  8.1*   < >  8.4*   < >  8.9  8.9   MAG  2.4*  1.9  1.4*   --    < >  1.7   < >  1.9  1.8   PHOS   --    --    --    --    --   3.3   --   5.0*  4.3    < > = values in this interval not displayed.       CBC -   Recent Labs   Lab Test  02/05/18   0629  02/03/18   0613  02/02/18   2213  02/02/18   1736  01/26/18   1446   WBC   --   8.4   --   7.4  10.7   HGB   --   9.5*   --   9.1*  9.9*   PLT  213  252  263  229  410       LFTs -   Recent Labs   Lab Test  02/02/18   1736  01/26/18   1446  01/07/18   1655  11/13/17   1709   ALKPHOS  86  95  76  104   BILITOTAL  0.4  0.4  0.4  0.8   ALT  16  16  24  26   AST  20  23  32  22   PROTTOTAL  6.2*   --   6.0*  6.0*   ALBUMIN  2.1*   --   1.8*  2.3*       Iron " Panel -   Recent Labs   Lab Test  07/19/17   1306  07/05/17   1204  06/21/17   1058   IRON  46  26*  69   IRONSAT  18  12*  29   ALBERTINA  369  542*  557*         Imaging:  All imaging studies reviewed by me.     Current Medications:    gelatin absorbable  1 each Topical During Hemodialysis (from stock)     sodium chloride (PF)  3 mL Intracatheter During Hemodialysis (from stock)     sodium chloride (PF)  3 mL Intracatheter During Hemodialysis (from stock)     insulin aspart  1-3 Units Subcutaneous TID AC     insulin aspart  1-3 Units Subcutaneous At Bedtime     allopurinol  400 mg Oral Daily     heparin  5,000 Units Subcutaneous Q12H     aspirin  81 mg Oral Daily     atorvastatin  40 mg Oral Daily     calcium acetate  667 mg Oral TID w/meals     omeprazole  20 mg Oral At Bedtime     predniSONE  5 mg Oral Daily       Bouchra Saunders MD     I, Russel Dobbins, saw this patient with Dr. Saunders and agree with the findings and plan of care as documented in the note. Will attempt daily HD to try to optimize cardiac function and get minimized likelihood that uremia could be contributing to worsening cardiomyopathy.

## 2018-02-05 NOTE — PROGRESS NOTES
Cards 2 Progress Note     Date of Admission: 2/2/2018  Hospital Day #: 3   Date of Service: 02/05/2018     Assessment & Plan      Murray Nicholson is a 63 year old male with a history of non-obstructive CAD, HFrEF due to ICM EF 10-15%, functionally bicuspid AV with ascending aortic aneurysm, ESRD on HD, HTN, T2DM who presented with worsening MEADE & orthopnea in the setting of worsening EF.    Acute on Chronic HFrEF due to ICM (EF 10-15%)   Patient's LVEF decreased from 40-45% on Nov 2016 to 20-25% in April 2017 and now 10-15%. Thought that patient's valvular disease was not severe enough to cause this reduction in systolic function, but he has been unable to have repeat angiogram due to CKD, he is now on HD. Last angiogram was 2006 with 60-70% LAD lesion in dLAD (FFR 0.85) and 30% pRCA, right dominant. Unfortunately he has been unable to tolerate HF medications recently due to hypotension during dialysis. Admitted with worsening MEADE and orthopnea concerning for hypervolemia, RHC on 2/3 with elevated elevated right (RV 56/18) and left-sided (PCWP 45) pressures. Cause of acute decompensation is likely inability to remove adequate fluid at dialysis due to hypotension (only removing ~ 1.6-1.7 L three times weekly).  - Nephrology consulted for dialysis    --> Planning to dialyze both Mon 2/5 and Tues 2/6, goal to remove 3-4L    --> Strict Is & Os, daily weights, 2L fluid restriction  - BB: Holding due to acute decompensation  - ACEi/ARB: Holding due to hypotension  - Afterload: Holding home hydralazine due to hypotension  - Aldosterone antagonist: ESRD  - CAD:   > Aspirin 81 mg daily   > Atorvastatin 40 mg daily   > Will need angiogram once volume status improved    ESRD on HD  Patient was previously on PD since summer 2017, developed peritonitis in Jan 2017 and switched to HD (T-Th-Sat). ESRD is likely secondary to HTN and T2DM. Has tunneled catheter for his access.   - Nephrology consult, appreciate recs    --> Dialysis  "today 2/5 with removal of 2.9kg    T2DM  - Hold home glipizide  - Increase to medium intensity SSI  - Hypoglycemia protocol    Gout  - Continue PTA prednisone 5 mg daily  - Continue PTA allopurinol 400 mg daily    Normocytic anemia  Hgb stable around 9-10g/dL. Likely secondary to ESRD.  - Continue Epogen 4000 units Q3 weeks, start tomorrow per renal    FEN  -   Active Diet Order      2 Gram Sodium Diet, 2L fluid restriction  - PRN lytes replacement    Prophy/Misc: VTE:  Heparin SQ  Lines: HD line, PIV  Code status: Full Code  Disposition: Expected discharge: 3-4 days, recommended to prior living arrangement once improved volume status, coronary angiogram.    Patient seen and discussed with Dr. Madhu Muse, who agrees with the above assessment and plan.    Mellisa Brunner  Internal Medicine, PGY3  [phone]  _____________________________________________________________________________  Interval History     No acute events overnight. Mr. Nicholson says that he slept very well last night. He continues to feel short of breath with minimal exertion. Appetite is intact. No lower extremity swelling. He is aware of the plan to continue to try to remove fluid with dialysis. He is wondering if we can complete [facility] paperwork.    -------------------------------------------------------------------------------------------------------------------------------------    Physical Exam   /83  Pulse 115  Temp 98.2  F (36.8  C) (Oral)  Resp 16  Ht 1.753 m (5' 9\")  Wt 76.6 kg (168 lb 12.8 oz)  SpO2 99%  BMI 24.93 kg/m2  I/O last 3 completed shifts:  In: 780 [P.O.:780]  Out: 0     Constitutional: Middle-aged man, sitting up in bed comfortably, NAD   Respiratory: Few bibasilar crackles, otherwise CTAB, no wheezing, wearing O2 NC  Cardiovascular: Tachycardic, regular rhythm, no murmurs or rubs appreciated, JVD at base of neck  GI: Soft, non-tender, non-distended, no rebound or guarding  Skin: Warm and dry, no concerning lesions " or rash on exposed surfaces.  Extremities: No LE edema, warm and well-perfused  Neuro: CN II-XII grossly intact, no focal deficits on limited exam     Medications      *Cardiac medications: aspirin 81mg daily, atorvastatin 40mg daily    Data      CBC    Recent Labs  Lab 02/05/18  0629 02/03/18  0613 02/02/18  2213 02/02/18  1736   WBC  --  8.4  --  7.4   RBC  --  3.06*  --  2.91*   HGB  --  9.5*  --  9.1*   HCT  --  31.1*  --  29.2*   MCV  --  102*  --  100   MCH  --  31.0  --  31.3   MCHC  --  30.5*  --  31.2*   RDW  --  20.1*  --  19.7*    252 263 229     CMP    Recent Labs  Lab 02/05/18  0629 02/04/18  1846 02/04/18  0654 02/03/18  0613 02/02/18  1736     --  135 142 141   POTASSIUM 4.3  --  4.2 4.4 4.4   CHLORIDE 102  --  101 107 106   CO2 24  --  24 25 22   ANIONGAP 12  --  10 10 13   *  --  127* 121* 240*   BUN 42*  --  25 37* 30   CR 6.39*  --  4.55* 6.41* 5.58*   GFRESTIMATED 9*  --  13* 9* 10*   GFRESTBLACK 11*  --  16* 11* 13*   TRINI 8.8  --  8.8 8.1* 8.2*   MAG 2.4* 1.9 1.4*  --   --    PROTTOTAL  --   --   --   --  6.2*   ALBUMIN  --   --   --   --  2.1*   BILITOTAL  --   --   --   --  0.4   ALKPHOS  --   --   --   --  86   AST  --   --   --   --  20   ALT  --   --   --   --  16     INRNo lab results found in last 7 days.     Imaging:     No new imaging studies today.    I have reviewed today's vital signs, notes, medications, labs and imaging.  I have also seen and examined the patient and agree with the findings and plan as outlined above.  Pt without complaints following HD.  VSS with /66. Lungs with bibasilar rales and S1 and S2.  Labs with K 4.3.  Assessment: Pt with cardiomyopathy and ESRD on HD and will need daily HD.  Pt will need coronary angiogram once 20-25 lbs has been removed.      Bobby Muse MD, PhD  Professor, Heart Failure and Cardiac Transplantation  [facility]

## 2018-02-05 NOTE — PROGRESS NOTES
Care Coordinator Progress Note     Admission Date/Time:  2/2/2018  Attending MD:  Deandre Muñoz MD   Data  Chart reviewed, discussed with interdisciplinary team.   Patient was admitted for:    MEADE (dyspnea on exertion)  Acute on chronic systolic heart failure (H)  Familial cardiomyopathy (H).    Concerns with insurance coverage for discharge needs:  to discuss FMLA form.   Current Living Situation: Patient lives alone. Pt said that he is independent with his own care.   Support System: Supportive and Involved sons.   Services Involved: [facility]  Transportation: Family or Friend will provide  Barriers to Discharge: medical condition.     Coordination of Care and Referrals: Provided patient/family with options for resumption of outpt dialysis.       Assessment  Pt said that he is already set up for outpt dialysis and will resume their services.   Intervention:   Arrangements made with Kunal Lange (Ph: [phone] Fax: [phone]) for resumption of outpt dialysis on T-Th-Sat at 6:15 AM.    Plan  Anticipated Discharge Date:  TBD  Anticipated Discharge Plan:  Discharge to home.     JONATHAN BULLARD RN BSN  Care Coordinator  [phone]

## 2018-02-05 NOTE — PLAN OF CARE
Problem: Patient Care Overview  Goal: Plan of Care/Patient Progress Review  Outcome: No Change  D: Heart failure. Fluid up.  I/A: VSS. Denies pain. Sinus tach. On O2 via nasal cannula at 4 Lpm while sleeping. Magnesium replacement given per MD order. Uneventful shift.  P: Continue to monitor.

## 2018-02-05 NOTE — PROGRESS NOTES
SW consulted to assist with faxing FMLA/leave paperwork.  SW faxed packet and made copies per pt request.  Original given to pt and copy placed in pt's chart.    NIRAV Tyler, APSW  6C Unit   Phone: [phone]  Pager: [phone]  Unit: [phone]

## 2018-02-05 NOTE — PROGRESS NOTES
HEMODIALYSIS TREATMENT NOTE    Date: 2/5/2018  Time: 2:47 PM    Data:  Pre Wt: 76.6 kg (168 lb 14 oz)   Desired Wt: 73.6 kg   Post Wt:    Weight gain:   -2.9kg   Weight change:   2.9kg  Ultrafiltration - Post Run Net Total Removed (mL): 2900 mL  Ultrafiltration - Post Run Net Total Gain (mL): 0 mL  Vascular Access Status: Yes, secured and visible  Dialyzer Rinse: Streaked  Total Blood Volume Processed: 63.9  Total Dialysis (Treatment) Time:  3hr 25min    Lab:    NO    Interventions: 3.5hr of HD run via RCVC lines were reversed mid-run d/t increased AP. Tx stopped 5 min early d/t increased  and AP and increased risk for clotting the system despite frequent flushing. Next dialysis tx with heparin if not contraindicated is recommended fellow Chip victoria. Total fluid removed 2.9kg pt tolerated well, VS stable.  Lines were flushed with NS. See MAR for medication details. Hand off report was given to primary RN Anastasia on 6C.     Assessment:  Pt A&O. Slept most the tx. VS stable. AP and  increased.    Plan: Continue with plan of care. Next dialysis per renal team.

## 2018-02-05 NOTE — PLAN OF CARE
Problem: Patient Care Overview  Goal: Plan of Care/Patient Progress Review    D/I/A: Pt admitted with decreased EF, on hemodialysis, and has hx of diabetes. Diabetic Educator Consult order placed today, anticipate that will occur tomorrow being that it's the weekend.  Blood sugars range from low 100's-300's. Pt placed on 2L Fluid Restriction and 2 Gram Sodium Diet this afternoon as well as strict I & O's-discussed with pt what this entails (ie. Saving any urine, notifying staff of his fluid intake, etc.); pt will need continued reinforcement with this. Mg 1.4, replaced with 1 gram of Magnesium Sulfate per one time order.    P: Patient to have hemodialysis run tomorrow. Pt also to have coronary angio prior to discharge. Contact Cards 2 for any questions/concerns.     Alma Rosa Duenas RN  Cardiology

## 2018-02-06 LAB
ANION GAP SERPL CALCULATED.3IONS-SCNC: 11 MMOL/L (ref 3–14)
BUN SERPL-MCNC: 32 MG/DL (ref 7–30)
CALCIUM SERPL-MCNC: 8.6 MG/DL (ref 8.5–10.1)
CHLORIDE SERPL-SCNC: 103 MMOL/L (ref 94–109)
CO2 SERPL-SCNC: 24 MMOL/L (ref 20–32)
CREAT SERPL-MCNC: 5.22 MG/DL (ref 0.66–1.25)
ERYTHROCYTE [DISTWIDTH] IN BLOOD BY AUTOMATED COUNT: 19.7 % (ref 10–15)
GFR SERPL CREATININE-BSD FRML MDRD: 11 ML/MIN/1.7M2
GLUCOSE BLDC GLUCOMTR-MCNC: 169 MG/DL (ref 70–99)
GLUCOSE BLDC GLUCOMTR-MCNC: 182 MG/DL (ref 70–99)
GLUCOSE BLDC GLUCOMTR-MCNC: 246 MG/DL (ref 70–99)
GLUCOSE BLDC GLUCOMTR-MCNC: 248 MG/DL (ref 70–99)
GLUCOSE SERPL-MCNC: 162 MG/DL (ref 70–99)
HCT VFR BLD AUTO: 32.7 % (ref 40–53)
HGB BLD-MCNC: 10.1 G/DL (ref 13.3–17.7)
MAGNESIUM SERPL-MCNC: 2 MG/DL (ref 1.6–2.3)
MCH RBC QN AUTO: 31 PG (ref 26.5–33)
MCHC RBC AUTO-ENTMCNC: 30.9 G/DL (ref 31.5–36.5)
MCV RBC AUTO: 100 FL (ref 78–100)
PLATELET # BLD AUTO: 192 10E9/L (ref 150–450)
POTASSIUM SERPL-SCNC: 4.3 MMOL/L (ref 3.4–5.3)
RBC # BLD AUTO: 3.26 10E12/L (ref 4.4–5.9)
SODIUM SERPL-SCNC: 138 MMOL/L (ref 133–144)
WBC # BLD AUTO: 8.3 10E9/L (ref 4–11)

## 2018-02-06 PROCEDURE — 90937 HEMODIALYSIS REPEATED EVAL: CPT

## 2018-02-06 PROCEDURE — 25000128 H RX IP 250 OP 636: Performed by: INTERNAL MEDICINE

## 2018-02-06 PROCEDURE — 99233 SBSQ HOSP IP/OBS HIGH 50: CPT | Mod: GC | Performed by: INTERNAL MEDICINE

## 2018-02-06 PROCEDURE — 36415 COLL VENOUS BLD VENIPUNCTURE: CPT | Performed by: INTERNAL MEDICINE

## 2018-02-06 PROCEDURE — 63400005 ZZH RX 634: Performed by: INTERNAL MEDICINE

## 2018-02-06 PROCEDURE — 25000132 ZZH RX MED GY IP 250 OP 250 PS 637: Performed by: STUDENT IN AN ORGANIZED HEALTH CARE EDUCATION/TRAINING PROGRAM

## 2018-02-06 PROCEDURE — 85027 COMPLETE CBC AUTOMATED: CPT | Performed by: INTERNAL MEDICINE

## 2018-02-06 PROCEDURE — 25000125 ZZHC RX 250: Performed by: INTERNAL MEDICINE

## 2018-02-06 PROCEDURE — 80048 BASIC METABOLIC PNL TOTAL CA: CPT | Performed by: INTERNAL MEDICINE

## 2018-02-06 PROCEDURE — 21400006 ZZH R&B CCU INTERMEDIATE UMMC

## 2018-02-06 PROCEDURE — 00000146 ZZHCL STATISTIC GLUCOSE BY METER IP

## 2018-02-06 PROCEDURE — 83735 ASSAY OF MAGNESIUM: CPT | Performed by: INTERNAL MEDICINE

## 2018-02-06 PROCEDURE — 25000132 ZZH RX MED GY IP 250 OP 250 PS 637: Performed by: INTERNAL MEDICINE

## 2018-02-06 RX ORDER — HEPARIN SODIUM 1000 [USP'U]/ML
500 INJECTION, SOLUTION INTRAVENOUS; SUBCUTANEOUS
Status: COMPLETED | OUTPATIENT
Start: 2018-02-06 | End: 2018-02-06

## 2018-02-06 RX ORDER — HEPARIN SODIUM 1000 [USP'U]/ML
500 INJECTION, SOLUTION INTRAVENOUS; SUBCUTANEOUS CONTINUOUS
Status: DISCONTINUED | OUTPATIENT
Start: 2018-02-06 | End: 2018-02-06

## 2018-02-06 RX ORDER — DOXERCALCIFEROL 4 UG/2ML
1 INJECTION INTRAVENOUS
Status: COMPLETED | OUTPATIENT
Start: 2018-02-06 | End: 2018-02-06

## 2018-02-06 RX ADMIN — EPOETIN ALFA 4000 UNITS: 10000 SOLUTION INTRAVENOUS; SUBCUTANEOUS at 11:09

## 2018-02-06 RX ADMIN — ATORVASTATIN CALCIUM 40 MG: 40 TABLET, FILM COATED ORAL at 12:40

## 2018-02-06 RX ADMIN — HEPARIN SODIUM 5000 UNITS: 5000 INJECTION, SOLUTION INTRAVENOUS; SUBCUTANEOUS at 21:44

## 2018-02-06 RX ADMIN — HEPARIN SODIUM 500 UNITS/HR: 1000 INJECTION, SOLUTION INTRAVENOUS; SUBCUTANEOUS at 08:15

## 2018-02-06 RX ADMIN — CALCIUM ACETATE 667 MG: 667 CAPSULE ORAL at 07:55

## 2018-02-06 RX ADMIN — PREDNISONE 5 MG: 5 TABLET ORAL at 07:54

## 2018-02-06 RX ADMIN — ALLOPURINOL 400 MG: 300 TABLET ORAL at 07:54

## 2018-02-06 RX ADMIN — CALCIUM ACETATE 667 MG: 667 CAPSULE ORAL at 12:45

## 2018-02-06 RX ADMIN — TRAMADOL HYDROCHLORIDE 50 MG: 50 TABLET, COATED ORAL at 21:43

## 2018-02-06 RX ADMIN — SODIUM CHLORIDE 250 ML: 9 INJECTION, SOLUTION INTRAVENOUS at 08:15

## 2018-02-06 RX ADMIN — HEPARIN SODIUM 5000 UNITS: 5000 INJECTION, SOLUTION INTRAVENOUS; SUBCUTANEOUS at 12:46

## 2018-02-06 RX ADMIN — OMEPRAZOLE 20 MG: 20 CAPSULE, DELAYED RELEASE ORAL at 21:43

## 2018-02-06 RX ADMIN — HEPARIN SODIUM 500 UNITS: 1000 INJECTION, SOLUTION INTRAVENOUS; SUBCUTANEOUS at 08:15

## 2018-02-06 RX ADMIN — DOXERCALCIFEROL 1 MCG: 4 INJECTION, SOLUTION INTRAVENOUS at 08:21

## 2018-02-06 RX ADMIN — CALCIUM ACETATE 667 MG: 667 CAPSULE ORAL at 18:21

## 2018-02-06 RX ADMIN — SODIUM CHLORIDE 300 ML: 9 INJECTION, SOLUTION INTRAVENOUS at 08:15

## 2018-02-06 RX ADMIN — ASPIRIN 81 MG CHEWABLE TABLET 81 MG: 81 TABLET CHEWABLE at 12:39

## 2018-02-06 ASSESSMENT — PAIN DESCRIPTION - DESCRIPTORS: DESCRIPTORS: ACHING;DISCOMFORT

## 2018-02-06 NOTE — PROGRESS NOTES
D: 63 year old male with fluid overload. CHF with EF of 10-15%. Patient has stage 5 CKD and is on hemodialysis. Type 2 DM.  I: Monitored vitals and patient status. Blood glucose taken before meals, weight taken after dialysis (157.5)  A: Patient had 1.5 kg removed during dialysis this AM. Sinus tachycardic. Patient lightheaded upon standing after dialysis. Eating well, BM this morning (2/6). Oliguric. Patient has sats in the high 90's, but requests O2 during episodes of shortness of breath.  P: Encourage rest. Up with assistance. Monitor BP and pulse after dialysis. Continue to monitor patient and observe for changes in status

## 2018-02-06 NOTE — PLAN OF CARE
Problem: Patient Care Overview  Goal: Plan of Care/Patient Progress Review  Outcome: No Change  D: Pt symptomatic/hypotensive @ outpatient dialysis- admitted for tachycardia, hypotension, increased SOB, decreased EF (10-15%)    I: Monitored vitals and assessed pt status.   Running: PIV SL, TDC  PRN:     A: A0x4. VSS. Afebrile. ST- HR 110s. Pt prefers to wear 3L NC for comfort. Denies pain- takes tramadol in evening for back pain. Pt up ind/SBA. Eating well. BG checks- SSI. Pt needs diabetes education before dc. Oliguric. UA sent today. BM this AM. Dialysis done today- 3Kg removed. Pt will have angiogram this admission once pt has improved volume status. Pt very pleasant, cooperative.     P: Likely dialysis tomorrow. Continue to monitor Pt status and report changes to treatment team.

## 2018-02-06 NOTE — PROGRESS NOTES
Nephrology Progress Note  02/06/2018         Assessment & Recommendations:   Murray Nicholson is a 63 year old year old male    End-stage renal disease: Due to diabetes, pain in peritoneal dialysis since August, recently changed to intermittent hemodialysis to schedule this Tuesday Thursday and Saturday, via right IJ tunneled catheter, 3 hours, estimated dry weight 80 kg, and care of ,, patient daily dialysis trying to remove fluid off due to right heart pressure and volume overload.    Blood pressure and volume: Blood pressure is in the low side, his right heart cath shows large volume and increasing pressure bolus to UF daily for volume optimization.  We will continue dialysis daily daily is a new dry weight will be determined in the future.    Electrolyte and acid-base: Potassium 4.3, magnesium improved after replacement now 2.4 before dialysis.  Sodium 138 no issues, bicarb 24.    Anemia: Of chronic disease his dialysis unit record shows the patient 4000 units 3 times a week.  Will start EPO.    Bone and mineral disease: Continue Hectorol 1 MCG 3 times a week    HErEF: EF down to 10 to 15% from 20 to 25, left cath not done is right Cath shows   High right sided filling pressures.  Very High left sided filling pressures.  Severe pulmonary artery hypertension  High left ventricular filling pressures.  Reduced cardiac output, 3.16 L/min with index 1.63 L/min/m2    Coronary angiogram deferred given elevated left sided filling pressures.    Recommendations were communicated to primary team via note    Seen and discussed with Dr. Shilpi Saunders MD   [phone]    Interval History :   In the last 24 hours Murray Nicholson feels fine slept good last night, no acute distress but still have wave of sob, no major events.    Review of Systems:   I reviewed the following systems:  GI: good appetite. no nausea or vomiting or diarrhea.   Neuro:  no confusion  Constitutional:  no fever or chills  CV: some dyspnea  "or edema.  no chest pain.    Physical Exam:   I/O last 3 completed shifts:  In: 390 [P.O.:390]  Out: 2960 [Urine:60; Other:2900]   /73 (BP Location: Right arm)  Pulse 115  Temp 97.9  F (36.6  C) (Oral)  Resp 16  Ht 1.753 m (5' 9\")  Wt 71.4 kg (157 lb 8 oz)  SpO2 99%  BMI 23.26 kg/m2     GENERAL APPEARANCE: NAD  EYES:  no scleral icterus, pupils equal  HENT: mouth without ulcers or lesions  PULM: lungs Clear to auscultation,  bilaterally, equal air movement, no clubbing  CV: regular rhythm, normal rate, no rub     -JVD not elevated      -edema none    GI: soft, nottender, nopndistended, bowel sounds are present  INTEGUMENT: no cyanosis, no rash  NEURO:  no asterixis   Access RIJ tunnel    Labs:   All labs reviewed by me  Electrolytes/Renal -   Recent Labs   Lab Test  02/06/18   0612 02/05/18   0629  02/04/18   1846  02/04/18   0654   01/12/18   0742   11/15/17   0758  11/14/17   0645   NA  138  138   --   135   < >  137   < >  144  141   POTASSIUM  4.3  4.3   --   4.2   < >  3.5   < >  3.2*  3.7   CHLORIDE  103  102   --   101   < >  98   < >  103  103   CO2  24  24   --   24   < >  32   < >  30  27   BUN  32*  42*   --   25   < >  38*   < >  69*  76*   CR  5.22*  6.39*   --   4.55*   < >  8.27*   < >  6.98*  6.85*   GLC  162*  176*   --   127*   < >  250*   < >  294*  312*   TRINI  8.6  8.8   --   8.8   < >  8.4*   < >  8.9  8.9   MAG  2.0  2.4*  1.9  1.4*   < >  1.7   < >  1.9  1.8   PHOS   --    --    --    --    --   3.3   --   5.0*  4.3    < > = values in this interval not displayed.       CBC -   Recent Labs   Lab Test  02/06/18 0612 02/05/18   0629  02/03/18   0613   02/02/18   1736   WBC  8.3   --   8.4   --   7.4   HGB  10.1*   --   9.5*   --   9.1*   PLT  192  213  252   < >  229    < > = values in this interval not displayed.       LFTs -   Recent Labs   Lab Test  02/02/18   1736  01/26/18   1446  01/07/18   1655  11/13/17   1709   ALKPHOS  86  95  76  104   BILITOTAL  0.4  0.4  0.4  0.8 "   ALT  16  16  24  26   AST  20  23  32  22   PROTTOTAL  6.2*   --   6.0*  6.0*   ALBUMIN  2.1*   --   1.8*  2.3*       Iron Panel -   Recent Labs   Lab Test  07/19/17   1306  07/05/17   1204  06/21/17   1058   IRON  46  26*  69   IRONSAT  18  12*  29   ALBERTINA  369  542*  557*         Imaging:  All imaging studies reviewed by me.     Current Medications:    insulin aspart  1-7 Units Subcutaneous TID AC     insulin aspart  1-5 Units Subcutaneous At Bedtime     allopurinol  400 mg Oral Daily     heparin  5,000 Units Subcutaneous Q12H     aspirin  81 mg Oral Daily     atorvastatin  40 mg Oral Daily     calcium acetate  667 mg Oral TID w/meals     omeprazole  20 mg Oral At Bedtime     predniSONE  5 mg Oral Daily       Bouchra Saunders MD     I, Russel Dobbins, saw this patient with Dr. Saunders and agree with the findings and plan of care as documented in the note.

## 2018-02-06 NOTE — PROGRESS NOTES
Cards 2 Progress Note     Date of Admission: 2/2/2018  Hospital Day #: 4   Date of Service: 02/06/2018     Assessment & Plan      uMrray Nicholson is a 63 year old male with a history of non-obstructive CAD, HFrEF due to ICM EF 10-15%, functionally bicuspid AV with ascending aortic aneurysm, ESRD on HD, HTN, T2DM who presented with worsening MEADE & orthopnea in the setting of worsening EF.    Acute on Chronic HFrEF due to ICM (EF 10-15%)   Patient's LVEF decreased from 40-45% on Nov 2016 to 20-25% in April 2017 and now 10-15%. It was thought that patient's valvular disease was not severe enough to cause this reduction in systolic function, but he has been unable to have repeat angiogram due to CKD, he is now on HD. Last angiogram was 2006 with 60-70% LAD lesion in dLAD (FFR 0.85) and 30% pRCA, right dominant. Unable to tolerate HF medications recently due to hypotension during dialysis. Admitted with worsening MEADE and orthopnea concerning for hypervolemia, RHC on 2/3 with elevated elevated right (RV 56/18) and left-sided (PCWP 45) pressures. Cause of acute decompensation is likely inability to remove adequate fluid with HD due to hypotension (only removing ~ 1.6-1.7 L each run).  - Nephrology consulted for dialysis, appreciate assistance    --> Planning to dialyze daily this week, will challenge dry weight - anticipate dialysis will also help with uremia which may contributing to worsening LVEF     --> Consider placing Franklin catheter to better assess volume status / cardiac function    --> Strict Is & Os, daily weights, 2L fluid restriction  - BB: Holding due to acute decompensation  - ACEi/ARB: Holding due to hypotension  - Afterload: Holding home hydralazine due to hypotension  - Aldosterone antagonist: ESRD  - CAD:   > Aspirin 81 mg daily   > Atorvastatin 40 mg daily   > Will need angiogram once volume status improved    ESRD on HD  Patient was previously on PD since summer 2017, developed peritonitis in Jan 2017 and  "switched to HD (T-Th-Sat). ESRD is likely secondary to HTN and T2DM. Has tunneled catheter for his access.   - Nephrology consult, appreciate recs    --> Dialysis daily this week as above    T2DM  - Hold home glipizide  - Hypoglycemia protocol  - Increased to medium intensity SSI    Gout  - Continue PTA prednisone 5 mg daily  - Continue PTA allopurinol 400 mg daily    Normocytic anemia  Hgb stable around 9-10g/dL. Likely secondary to ESRD.  - Continue Epogen 4000 units Q3 weeks, start 2/6 per renal    FEN  -   Active Diet Order      2 Gram Sodium Diet, 2L fluid restriction  - PRN lytes replacement    Prophy/Misc: VTE: Heparin SQ  Lines: HD line, PIV  Code status: Full Code  Disposition: Expected discharge: 3-4 days, recommended to prior living arrangement once improved volume status, coronary angiogram.    Patient seen and discussed with Dr. Madhu Muse, who agrees with the above assessment and plan.    Mellisa Brunner  Internal Medicine, PGY3  [phone]  _____________________________________________________________________________  Interval History     No acute events overnight. Mr. Nicholson was dialyzed yesterday with removal of 2.9 kg. He is again at dialysis this morning. He says he continues to feel mildly short of breath with minimal exertion, but his oxygen has been 100% on room air. He continues to have a good appetite.    -------------------------------------------------------------------------------------------------------------------------------------    Physical Exam   /74 (BP Location: Right arm)  Pulse 115  Temp 98  F (36.7  C) (Oral)  Resp 16  Ht 1.753 m (5' 9\")  Wt 74.3 kg (163 lb 12.8 oz)  SpO2 98%  BMI 24.19 kg/m2  I/O last 3 completed shifts:  In: 390 [P.O.:390]  Out: 2960 [Urine:60; Other:2900]    Constitutional: Middle-aged man, sitting up in bed comfortably eating breakfast, NAD   Respiratory: CTAB, no wheezing or crackles  Cardiovascular: Tachycardic, regular rhythm, no murmurs " or rubs appreciated  GI: Soft, non-distended  Skin: Warm and dry, no concerning lesions or rash on exposed surfaces  Extremities: No LE edema, warm and well-perfused  Neuro: CN II-XII grossly intact, no focal deficits on limited exam     Medications      *Cardiac medications: aspirin 81mg daily, atorvastatin 40mg daily    Data      CBC    Recent Labs  Lab 02/06/18  0612 02/05/18  0629 02/03/18  0613 02/02/18  2213 02/02/18  1736   WBC 8.3  --  8.4  --  7.4   RBC 3.26*  --  3.06*  --  2.91*   HGB 10.1*  --  9.5*  --  9.1*   HCT 32.7*  --  31.1*  --  29.2*     --  102*  --  100   MCH 31.0  --  31.0  --  31.3   MCHC 30.9*  --  30.5*  --  31.2*   RDW 19.7*  --  20.1*  --  19.7*    213 252 263 229     CMP    Recent Labs  Lab 02/06/18  0612 02/05/18  0629 02/04/18  1846 02/04/18  0654 02/03/18  0613 02/02/18  1736    138  --  135 142 141   POTASSIUM 4.3 4.3  --  4.2 4.4 4.4   CHLORIDE 103 102  --  101 107 106   CO2 24 24  --  24 25 22   ANIONGAP 11 12  --  10 10 13   * 176*  --  127* 121* 240*   BUN 32* 42*  --  25 37* 30   CR 5.22* 6.39*  --  4.55* 6.41* 5.58*   GFRESTIMATED 11* 9*  --  13* 9* 10*   GFRESTBLACK 14* 11*  --  16* 11* 13*   TRINI 8.6 8.8  --  8.8 8.1* 8.2*   MAG 2.0 2.4* 1.9 1.4*  --   --    PROTTOTAL  --   --   --   --   --  6.2*   ALBUMIN  --   --   --   --   --  2.1*   BILITOTAL  --   --   --   --   --  0.4   ALKPHOS  --   --   --   --   --  86   AST  --   --   --   --   --  20   ALT  --   --   --   --   --  16     INRNo lab results found in last 7 days.     Imaging:     No new imaging studies today.    I have reviewed today's vital signs, notes, medications, labs and imaging.  I have also seen and examined the patient and agree with the findings and plan as outlined above.  Pt with HD in progress no complaints.  VSS with  and /62.  Lungs with bibasilar rales and S1 and S2 with S3 and II/VI HSM.  Labs with K 4.3 and WBC 8.3.  Assessment: Pt with endstage heart failure  with ESRD on daily HD.  Pt will need coronary angiography prior to d/c.     Bobby Muse MD, PhD  Professor, Heart Failure and Cardiac Transplantation  [facility]

## 2018-02-06 NOTE — PROGRESS NOTES
HEMODIALYSIS TREATMENT NOTE    Date: 2/6/2018  Time: 11:57 AM    Data:  Pre Wt:   74.3 kg  Desired Wt:   71.3 kg   Ultrafiltration - Post Run Net Total Removed (mL): 1500 mL  Ultrafiltration - Post Run Net Total Gain (mL): 0 mL  Vascular Access Status: Yes, secured and visible  Dialyzer Rinse: Streaked  Total Blood Volume Processed:   58.6 L  Total Dialysis (Treatment) Time:    3.5 hours    Lab:   No    Assessment/Interventions:  3.5 hours of HD via tunneled RIJ CVC. 350 BFR with lines reversed, reduced to 300 2/2 collapsing. Attempted to pull 3 kg but reduced UF goal d/t increasing HR, SBP<95, and patient complaints of SOB and light headedness. Nephrology aware. 1.5 kg total removed. Epogen and Hectorol given as ordered. CVC saline locked post-treatment. See MAR for medication details. Report given to primary RN on 6C.       Plan:    Per renal.

## 2018-02-06 NOTE — PLAN OF CARE
Problem: Cardiac: Heart Failure (Adult)  Goal: Signs and Symptoms of Listed Potential Problems Will be Absent, Minimized or Managed (Cardiac: Heart Failure)  Signs and symptoms of listed potential problems will be absent, minimized or managed by discharge/transition of care (reference Cardiac: Heart Failure (Adult) CPG).   Outcome: No Change  D: Patient is alert and oriented X4, C/O shoulder and back pain and PRN Tramadol PO given X1 with relief.    I/A: On tele with tachy rhythm in 100s, EF of 10-15% and was dialyzed yesterday with 3-Liters removed.  Might probably go for another dialysis to eliminate the excess fluid overload (weight gain).  P:  Patient needs PLC for diabetic teaching.  Will continue with cares and notify MD of status changes.

## 2018-02-07 LAB
ANION GAP SERPL CALCULATED.3IONS-SCNC: 11 MMOL/L (ref 3–14)
BUN SERPL-MCNC: 30 MG/DL (ref 7–30)
CALCIUM SERPL-MCNC: 9.3 MG/DL (ref 8.5–10.1)
CHLORIDE SERPL-SCNC: 102 MMOL/L (ref 94–109)
CO2 SERPL-SCNC: 24 MMOL/L (ref 20–32)
CREAT SERPL-MCNC: 5.08 MG/DL (ref 0.66–1.25)
GFR SERPL CREATININE-BSD FRML MDRD: 12 ML/MIN/1.7M2
GLUCOSE BLDC GLUCOMTR-MCNC: 178 MG/DL (ref 70–99)
GLUCOSE BLDC GLUCOMTR-MCNC: 205 MG/DL (ref 70–99)
GLUCOSE BLDC GLUCOMTR-MCNC: 220 MG/DL (ref 70–99)
GLUCOSE BLDC GLUCOMTR-MCNC: 292 MG/DL (ref 70–99)
GLUCOSE SERPL-MCNC: 168 MG/DL (ref 70–99)
MAGNESIUM SERPL-MCNC: 2.1 MG/DL (ref 1.6–2.3)
POTASSIUM SERPL-SCNC: 5 MMOL/L (ref 3.4–5.3)
SODIUM SERPL-SCNC: 137 MMOL/L (ref 133–144)

## 2018-02-07 PROCEDURE — 21400006 ZZH R&B CCU INTERMEDIATE UMMC

## 2018-02-07 PROCEDURE — 80048 BASIC METABOLIC PNL TOTAL CA: CPT | Performed by: INTERNAL MEDICINE

## 2018-02-07 PROCEDURE — 36415 COLL VENOUS BLD VENIPUNCTURE: CPT | Performed by: INTERNAL MEDICINE

## 2018-02-07 PROCEDURE — 25000132 ZZH RX MED GY IP 250 OP 250 PS 637: Performed by: INTERNAL MEDICINE

## 2018-02-07 PROCEDURE — 90937 HEMODIALYSIS REPEATED EVAL: CPT

## 2018-02-07 PROCEDURE — 25000125 ZZHC RX 250: Performed by: INTERNAL MEDICINE

## 2018-02-07 PROCEDURE — 83735 ASSAY OF MAGNESIUM: CPT | Performed by: INTERNAL MEDICINE

## 2018-02-07 PROCEDURE — 25000128 H RX IP 250 OP 636: Performed by: INTERNAL MEDICINE

## 2018-02-07 PROCEDURE — 99233 SBSQ HOSP IP/OBS HIGH 50: CPT | Mod: GC | Performed by: INTERNAL MEDICINE

## 2018-02-07 PROCEDURE — 00000146 ZZHCL STATISTIC GLUCOSE BY METER IP

## 2018-02-07 PROCEDURE — 25000132 ZZH RX MED GY IP 250 OP 250 PS 637: Performed by: STUDENT IN AN ORGANIZED HEALTH CARE EDUCATION/TRAINING PROGRAM

## 2018-02-07 RX ORDER — HEPARIN SODIUM 1000 [USP'U]/ML
500 INJECTION, SOLUTION INTRAVENOUS; SUBCUTANEOUS
Status: COMPLETED | OUTPATIENT
Start: 2018-02-07 | End: 2018-02-07

## 2018-02-07 RX ORDER — HEPARIN SODIUM 1000 [USP'U]/ML
500 INJECTION, SOLUTION INTRAVENOUS; SUBCUTANEOUS CONTINUOUS
Status: DISCONTINUED | OUTPATIENT
Start: 2018-02-07 | End: 2018-02-07

## 2018-02-07 RX ADMIN — HEPARIN SODIUM 5000 UNITS: 5000 INJECTION, SOLUTION INTRAVENOUS; SUBCUTANEOUS at 22:00

## 2018-02-07 RX ADMIN — TRAMADOL HYDROCHLORIDE 50 MG: 50 TABLET, COATED ORAL at 22:00

## 2018-02-07 RX ADMIN — HEPARIN SODIUM 5000 UNITS: 5000 INJECTION, SOLUTION INTRAVENOUS; SUBCUTANEOUS at 10:27

## 2018-02-07 RX ADMIN — ATORVASTATIN CALCIUM 40 MG: 40 TABLET, FILM COATED ORAL at 07:59

## 2018-02-07 RX ADMIN — SENNOSIDES AND DOCUSATE SODIUM 2 TABLET: 8.6; 5 TABLET ORAL at 08:11

## 2018-02-07 RX ADMIN — SODIUM CHLORIDE 250 ML: 9 INJECTION, SOLUTION INTRAVENOUS at 14:14

## 2018-02-07 RX ADMIN — CALCIUM ACETATE 667 MG: 667 CAPSULE ORAL at 08:00

## 2018-02-07 RX ADMIN — OMEPRAZOLE 20 MG: 20 CAPSULE, DELAYED RELEASE ORAL at 22:00

## 2018-02-07 RX ADMIN — PREDNISONE 5 MG: 5 TABLET ORAL at 08:00

## 2018-02-07 RX ADMIN — CALCIUM ACETATE 667 MG: 667 CAPSULE ORAL at 12:40

## 2018-02-07 RX ADMIN — HEPARIN SODIUM 500 UNITS: 1000 INJECTION, SOLUTION INTRAVENOUS; SUBCUTANEOUS at 14:13

## 2018-02-07 RX ADMIN — ASPIRIN 81 MG CHEWABLE TABLET 81 MG: 81 TABLET CHEWABLE at 07:59

## 2018-02-07 RX ADMIN — ALLOPURINOL 400 MG: 300 TABLET ORAL at 07:59

## 2018-02-07 RX ADMIN — SODIUM CHLORIDE 300 ML: 9 INJECTION, SOLUTION INTRAVENOUS at 14:13

## 2018-02-07 RX ADMIN — CALCIUM ACETATE 667 MG: 667 CAPSULE ORAL at 18:56

## 2018-02-07 RX ADMIN — HEPARIN SODIUM 500 UNITS/HR: 1000 INJECTION, SOLUTION INTRAVENOUS; SUBCUTANEOUS at 14:13

## 2018-02-07 NOTE — PROGRESS NOTES
Cards 2 Progress Note     Date of Admission: 2/2/2018  Hospital Day #: 5   Date of Service: 02/07/2018     Assessment & Plan      Murray Nicholson is a 63 year old male with a history of non-obstructive CAD, HFrEF due to ICM EF 10-15%, functionally bicuspid AV with ascending aortic aneurysm, ESRD on HD, HTN, T2DM who presented with worsening MEADE & orthopnea in the setting of worsening EF, undergoing daily dialysis for fluid removal in preparation for angiogram.    Acute on Chronic HFrEF due to ICM (EF 10-15%)   Patient's LVEF decreased from 40-45% on Nov 2016 to 20-25% in April 2017 and now 10-15%. It was thought that patient's valvular disease was not severe enough to cause this reduction in systolic function, but he has been unable to have repeat angiogram due to CKD, he is now on HD. Last angiogram was 2006 with 60-70% LAD lesion in dLAD (FFR 0.85) and 30% pRCA, right dominant. Unable to tolerate HF medications recently due to hypotension during dialysis. Admitted with worsening MEADE and orthopnea concerning for hypervolemia, RHC on 2/3 with elevated elevated right (RV 56/18) and left-sided (PCWP 45) pressures. Cause of acute decompensation is likely inability to remove adequate fluid with HD due to hypotension (only removing ~ 1.6-1.7 L each run).  - Nephrology consulted for dialysis, appreciate assistance    --> Planning to dialyze daily this week, will challenge dry weight - anticipate dialysis will also help with uremia which may contributing to worsening LVEF     --> Plan to do right and left heart cath tomorrow 2/8, will consider leaving [place] in place for better hemodynamic monitoring    --> Strict Is & Os, daily weights, 2L fluid restriction  - BB: Holding due to acute decompensation  - ACEi/ARB: Holding due to hypotension  - Afterload: Holding home hydralazine due to hypotension  - Aldosterone antagonist: ESRD  - CAD:   > Aspirin 81 mg daily   > Atorvastatin 40 mg daily   > Will need angiogram once volume  "status improved    ESRD on HD  Patient was previously on PD since summer 2017, developed peritonitis in Jan 2017 and switched to HD (T-Th-Sat). ESRD is likely secondary to HTN and T2DM. Has tunneled catheter for his access.   - Nephrology consult, appreciate recs    --> Dialysis daily this week as above    T2DM  Last HgbA1c 8.6 on 2/2/18, increased from 6.4 on 6/27/17.   - Hold home glipizide  - Hypoglycemia protocol  - Increased to medium intensity SSI  **Diabetes education completed this admission. Will need to follow-up closely with primary care.    Gout  - Continue PTA prednisone 5 mg daily  - Continue PTA allopurinol 400 mg daily    Normocytic Anemia  Hgb stable around 9-10g/dL. Likely secondary to ESRD.  - Continue Epogen 4000 units Q3 weeks, started 2/6 per renal    FEN  -   Active Diet Order      2 Gram Sodium Diet, 2L fluid restriction, will be NPO at midnight for right & left heart cath tomorrow  - PRN lytes replacement    Prophy/Misc: VTE: Heparin SQ  Lines: HD line, PIV  Code status: Full Code  Disposition: Expected discharge: 3-4 days, recommended to prior living arrangement once improved volume status, coronary angiogram.    Patient seen and discussed with Dr. Madhu Muse, who agrees with the above assessment and plan.    Mellisa Brunner  Internal Medicine, PGY3  [phone]  _____________________________________________________________________________  Interval History     Mr. Nicholson was dialyzed again yesterday, attempted to pull 3kg but patient reported shortness of breath and light-headedness with increased HR and SBP < 95 so 1.5kg was removed. Mr. Nicholson says he was feeling very poorly after dialysis including episodes of \"hyperventilation\", light-headedness and fatigue. He also says his HR increased to the 140s after activity yesterday. He is concerned about doing dialysis on a daily basis because of these " "symptoms.    -------------------------------------------------------------------------------------------------------------------------------------    Physical Exam   /70 (BP Location: Right arm)  Pulse 115  Temp 98.1  F (36.7  C) (Oral)  Resp 16  Ht 1.753 m (5' 9\")  Wt 72.8 kg (160 lb 8 oz)  SpO2 100%  BMI 23.7 kg/m2  I/O last 3 completed shifts:  In: 360 [P.O.:360]  Out: 1510 [Urine:10; Other:1500]    Constitutional: Middle-aged man, sitting up in bed comfortably, NAD   Respiratory: CTAB, no wheezing or crackles  Cardiovascular: Tachycardic, regular rhythm, no murmurs or rubs appreciated  GI: Soft, non-distended  Skin: Warm and dry, no concerning lesions or rash on exposed surfaces  Extremities: No LE edema, warm and well-perfused  Neuro: CN II-XII grossly intact, no focal deficits on limited exam     Medications      *Cardiac medications: aspirin 81mg daily, atorvastatin 40mg daily    Data      CBC    Recent Labs  Lab 02/06/18  0612 02/05/18  0629 02/03/18  0613 02/02/18  2213 02/02/18  1736   WBC 8.3  --  8.4  --  7.4   RBC 3.26*  --  3.06*  --  2.91*   HGB 10.1*  --  9.5*  --  9.1*   HCT 32.7*  --  31.1*  --  29.2*     --  102*  --  100   MCH 31.0  --  31.0  --  31.3   MCHC 30.9*  --  30.5*  --  31.2*   RDW 19.7*  --  20.1*  --  19.7*    213 252 263 229     CMP    Recent Labs  Lab 02/07/18  0718 02/06/18  0612 02/05/18  0629 02/04/18  1846 02/04/18  0654  02/02/18  1736    138 138  --  135  < > 141   POTASSIUM 5.0 4.3 4.3  --  4.2  < > 4.4   CHLORIDE 102 103 102  --  101  < > 106   CO2 24 24 24  --  24  < > 22   ANIONGAP 11 11 12  --  10  < > 13   * 162* 176*  --  127*  < > 240*   BUN 30 32* 42*  --  25  < > 30   CR 5.08* 5.22* 6.39*  --  4.55*  < > 5.58*   GFRESTIMATED 12* 11* 9*  --  13*  < > 10*   GFRESTBLACK 14* 14* 11*  --  16*  < > 13*   TRINI 9.3 8.6 8.8  --  8.8  < > 8.2*   MAG 2.1 2.0 2.4* 1.9 1.4*  < >  --    PROTTOTAL  --   --   --   --   --   --  6.2*   ALBUMIN  " --   --   --   --   --   --  2.1*   BILITOTAL  --   --   --   --   --   --  0.4   ALKPHOS  --   --   --   --   --   --  86   AST  --   --   --   --   --   --  20   ALT  --   --   --   --   --   --  16   < > = values in this interval not displayed.    INRNo lab results found in last 7 days.     Imaging:     No new imaging studies today.    I have reviewed today's vital signs, notes, medications, labs and imaging.  I have also seen and examined the patient and agree with the findings and plan as outlined above.  Pt without complaints. VSS with JVP 16 cm.  Exam unchanged.  Labs with K 5.  Assessment: Pt with endstage heart failure and ESRD on HD.  Will need addn'l fluid removal.    Bobby Muse MD, PhD  Professor, Heart Failure and Cardiac Transplantation  [facility]

## 2018-02-07 NOTE — PROGRESS NOTES
Nephrology Progress Note  02/07/2018         Assessment & Recommendations:   Murray Nicholson is a 63 year old year old male       End-stage renal disease: Due to diabetes, pain in peritoneal dialysis since August, recently changed to intermittent hemodialysis to schedule this Tuesday Thursday and Saturday, via right IJ tunneled catheter, 3 hours, estimated dry weight 80 kg, and care of ,, patient daily dialysis trying to remove fluid off due to right heart pressure and volume overload.     Blood pressure and volume: Blood pressure is in the low side, his right heart cath shows large volume and increasing pressure bolus to UF daily for volume optimization.  We will continue dialysis daily daily is a new dry weight will be determined in the future.     Electrolyte and acid-base: Potassium5.0, magnesium improved after replacement now 2.1.  Sodium 137 no issues, bicarb 24.     Anemia: hGB 10.1, due to chronic disease his dialysis unit record shows the patient epo 4000 units 3 times a week.        Bone and mineral disease: Continue Hectorol 1 MCG 3 times a week     HErEF: EF down to 10 to 15% from 20 to 25, left cath not done is right Cath shows   High right sided filling pressures.  Very High left sided filling pressures.  Severe pulmonary artery hypertension  High left ventricular filling pressures.  Reduced cardiac output, 3.16 L/min with index 1.63 L/min/m2    Coronary angiogram deferred given elevated left sided filling pressures.    Recommendations were communicated to primary team via note    Seen and discussed with Dr. Shilpi Saunders MD   [phone]    Interval History :   In the last 24 hours Murray Nicholson complaining of sob and wave of hyperventilation that happen after his dialysis yesterday. He is fatigued and tired, No nausea or vomiting no fever, no other complaint. No major events overnight.   Review of Systems:   I reviewed the following systems:  GI: good appetite. no nausea or  "vomiting or diarrhea.   Neuro:  no confusion  Constitutional:  no fever or chills  CV: some dyspnea or edema.  no chest pain.    Physical Exam:   I/O last 3 completed shifts:  In: 360 [P.O.:360]  Out: 1510 [Urine:10; Other:1500]   /70 (BP Location: Right arm)  Pulse 115  Temp 98.1  F (36.7  C) (Oral)  Resp 16  Ht 1.753 m (5' 9\")  Wt 72.8 kg (160 lb 8 oz)  SpO2 100%  BMI 23.7 kg/m2     GENERAL APPEARANCE: NAD  EYES:  no scleral icterus, pupils equal  HENT: mouth without ulcers or lesions  PULM: lungs clear to auscultation,  bilaterally, equal air movement, no clubbing  CV: regular rhythm, normal rate, no rub     -JVD not elevated      -edema none   GI: soft, nottender, nondistended, bowel sounds are present  INTEGUMENT: no cyanosis, no rash  NEURO:  no asterixis   Access RIJ tunnel.     Labs:   All labs reviewed by me  Electrolytes/Renal -   Recent Labs   Lab Test  02/07/18 0718  02/06/18 0612 02/05/18   0629   01/12/18   0742   11/15/17   0758  11/14/17   0645   NA  137  138  138   < >  137   < >  144  141   POTASSIUM  5.0  4.3  4.3   < >  3.5   < >  3.2*  3.7   CHLORIDE  102  103  102   < >  98   < >  103  103   CO2  24  24  24   < >  32   < >  30  27   BUN  30  32*  42*   < >  38*   < >  69*  76*   CR  5.08*  5.22*  6.39*   < >  8.27*   < >  6.98*  6.85*   GLC  168*  162*  176*   < >  250*   < >  294*  312*   TRINI  9.3  8.6  8.8   < >  8.4*   < >  8.9  8.9   MAG  2.1  2.0  2.4*   < >  1.7   < >  1.9  1.8   PHOS   --    --    --    --   3.3   --   5.0*  4.3    < > = values in this interval not displayed.       CBC -   Recent Labs   Lab Test  02/06/18   0612  02/05/18   0629  02/03/18   0613   02/02/18   1736   WBC  8.3   --   8.4   --   7.4   HGB  10.1*   --   9.5*   --   9.1*   PLT  192  213  252   < >  229    < > = values in this interval not displayed.       LFTs -   Recent Labs   Lab Test  02/02/18   1736  01/26/18   1446  01/07/18   1655  11/13/17   1709   ALKPHOS  86  95  76  104   BILITOTAL  " 0.4  0.4  0.4  0.8   ALT  16  16  24  26   AST  20  23  32  22   PROTTOTAL  6.2*   --   6.0*  6.0*   ALBUMIN  2.1*   --   1.8*  2.3*       Iron Panel -   Recent Labs   Lab Test  07/19/17   1306  07/05/17   1204  06/21/17   1058   IRON  46  26*  69   IRONSAT  18  12*  29   ALBERTINA  369  542*  557*         Imaging:  All imaging studies reviewed by me.     Current Medications:    sodium chloride 0.9%  250 mL Intravenous Once in dialysis     sodium chloride 0.9%  300 mL Hemodialysis Machine Once     gelatin absorbable  1 each Topical During Hemodialysis (from stock)     sodium chloride (PF)  3 mL Intracatheter During Hemodialysis (from stock)     sodium chloride (PF)  3 mL Intracatheter During Hemodialysis (from stock)     heparin (porcine)  500 Units Hemodialysis Machine Once in dialysis     insulin aspart  1-7 Units Subcutaneous TID AC     insulin aspart  1-5 Units Subcutaneous At Bedtime     allopurinol  400 mg Oral Daily     heparin  5,000 Units Subcutaneous Q12H     aspirin  81 mg Oral Daily     atorvastatin  40 mg Oral Daily     calcium acetate  667 mg Oral TID w/meals     omeprazole  20 mg Oral At Bedtime     predniSONE  5 mg Oral Daily       heparin (porcine)       Bouchra Saunders MD     I, Russel Dobbins, saw this patient with Dr. Saunders and agree with the findings and plan of care as documented in the note. Will continue daily hemodialysis tomorrow (Thursday), and then move to alternate day (thus next HD sessions Thursday, Saturday, Monday).

## 2018-02-07 NOTE — PLAN OF CARE
"Problem: Cardiac: Heart Failure (Adult)  Goal: Signs and Symptoms of Listed Potential Problems Will be Absent, Minimized or Managed (Cardiac: Heart Failure)  Signs and symptoms of listed potential problems will be absent, minimized or managed by discharge/transition of care (reference Cardiac: Heart Failure (Adult) CPG).    02/06/18 2151   Cardiac: Heart Failure   Problems Assessed (Heart Failure) cardiac pump dysfunction   Problems Present (Heart Failure) decreased quality of life;cardiac pump dysfunction   /78 (BP Location: Right arm)  Pulse 115  Temp 98.1  F (36.7  C) (Oral)  Resp 14  Ht 1.753 m (5' 9\")  Wt 71.4 kg (157 lb 8 oz)  SpO2 100%  BMI 23.26 kg/m2  D: Patient is alert and oriented X4, C/O shoulder and back pain and PRN Tramadol PO given X1 with relief.    I/A: On tele with tachy rhythm in 100s, EF of 10-15% and was dialysed yesterday 1 and 1/2 kg removed. Somewhat lethargic (AVSS) and gets MEADE and tachycardia.  On NC at 2-3L with sats in mid 90s.  P:  Patient needs PLC for diabetic teaching.  Will continue with cares and notify MD of status changes.             "

## 2018-02-07 NOTE — PROGRESS NOTES
Problem: Patient Care Overview  Goal: Plan of Care/Patient Progress Review  Outcome: No Change  D: Pt symptomatic/hypotensive @ outpatient dialysis- admitted for tachycardia, hypotension, increased SOB, decreased EF (10-15%)     I: Monitored vitals and assessed pt status.   Running: PIV SL, TDC  PRN:      A: A0x4. VSS. Afebrile. ST- -120s. Pt prefers to wear 3L NC for comfort, especially when feeling SOB- otherwise sating 100 on RA. Denies pain- takes tramadol in evening for back pain. Pt did not get up today- d/t symptoms when standing. After dialysis pt tried to walk to BR, but got very SOB and tachycardic. Sx aware, will continue to monitor. Eating well. BG checks- SSI. Pt needs diabetes education before dc- PLC order in. Oliguric. Dialysis done today- 1.5Kg removed- less removed d/t pts lightheadedness, symptoms during dialysis. Pt will have angiogram this admission once pt has improved volume status. Pt very pleasant, cooperative.      P: Continue to monitor Pt status and report changes to treatment team.

## 2018-02-07 NOTE — PROGRESS NOTES
HEMODIALYSIS TREATMENT NOTE    Date: 2/7/2018  Time: 4:55 PM    Data:  Pre Wt: 72.8 kg (168 lb 14 oz)   Desired Wt: 69.8 kg   Post Wt: 73   Weight gain:   kg    kg  Ultrafiltration - Post Run Net Total Removed (mL): mL  Ultrafiltration - Post Run Net Total Gain (mL): 200 mL  Vascular Access Status: Yes, secured and visible  Dialyzer Rinse: Streaked  Total Blood Volume Processed: 58.6  Total Dialysis (Treatment) Time:3         Interventions:Assessments  Pt dialyzed today for 3hrs on a K-2 Ca-2.5. No UF today due to hypotension. 300 Qb throughout. No meds given. See Epic for further details. Report to primary RN.      Plan:    Next HD per renal team

## 2018-02-07 NOTE — PROGRESS NOTES
Diabetes Education  Follow-up visit with patient.  He states he read the booklet I left with him yesterday, and found the nutrition information interesting, and he needs to see how it correlates with his diet for dialysis.      Discussed that I spoke with cards 2 team regarding medication/insulin plan for home.  This is yet to be determined, but they are hoping to have him resume his glipizide at discharge.    Discussed that although he is receiving correction scale insulin here, this would not be a typical plan for home.    Can return to see if there is a need for education on insulin administration for discharge.  Kristal Urbina MS RN CDE [facility]  [phone]

## 2018-02-08 ENCOUNTER — APPOINTMENT (OUTPATIENT)
Dept: CARDIOLOGY | Facility: CLINIC | Age: 63
DRG: 286 | End: 2018-02-08
Attending: STUDENT IN AN ORGANIZED HEALTH CARE EDUCATION/TRAINING PROGRAM
Payer: COMMERCIAL

## 2018-02-08 LAB
ANION GAP SERPL CALCULATED.3IONS-SCNC: 12 MMOL/L (ref 3–14)
BUN SERPL-MCNC: 23 MG/DL (ref 7–30)
CALCIUM SERPL-MCNC: 9.2 MG/DL (ref 8.5–10.1)
CHLORIDE SERPL-SCNC: 102 MMOL/L (ref 94–109)
CO2 SERPL-SCNC: 23 MMOL/L (ref 20–32)
CREAT SERPL-MCNC: 4.59 MG/DL (ref 0.66–1.25)
GFR SERPL CREATININE-BSD FRML MDRD: 13 ML/MIN/1.7M2
GLUCOSE BLDC GLUCOMTR-MCNC: 127 MG/DL (ref 70–99)
GLUCOSE BLDC GLUCOMTR-MCNC: 183 MG/DL (ref 70–99)
GLUCOSE BLDC GLUCOMTR-MCNC: 207 MG/DL (ref 70–99)
GLUCOSE SERPL-MCNC: 176 MG/DL (ref 70–99)
INTERPRETATION ECG - MUSE: NORMAL
KCT BLD-ACNC: 245 SEC (ref 105–167)
KCT BLD-ACNC: 257 SEC (ref 105–167)
MAGNESIUM SERPL-MCNC: 1.9 MG/DL (ref 1.6–2.3)
POTASSIUM SERPL-SCNC: 4.3 MMOL/L (ref 3.4–5.3)
SODIUM SERPL-SCNC: 137 MMOL/L (ref 133–144)

## 2018-02-08 PROCEDURE — 36415 COLL VENOUS BLD VENIPUNCTURE: CPT | Performed by: INTERNAL MEDICINE

## 2018-02-08 PROCEDURE — 27210807 ZZH SHEATH CR6

## 2018-02-08 PROCEDURE — 00000146 ZZHCL STATISTIC GLUCOSE BY METER IP

## 2018-02-08 PROCEDURE — 93010 ELECTROCARDIOGRAM REPORT: CPT | Performed by: INTERNAL MEDICINE

## 2018-02-08 PROCEDURE — 99152 MOD SED SAME PHYS/QHP 5/>YRS: CPT | Performed by: INTERNAL MEDICINE

## 2018-02-08 PROCEDURE — 80048 BASIC METABOLIC PNL TOTAL CA: CPT | Performed by: INTERNAL MEDICINE

## 2018-02-08 PROCEDURE — 93571 IV DOP VEL&/PRESS C FLO 1ST: CPT | Mod: 26 | Performed by: INTERNAL MEDICINE

## 2018-02-08 PROCEDURE — 4A023N6 MEASUREMENT OF CARDIAC SAMPLING AND PRESSURE, RIGHT HEART, PERCUTANEOUS APPROACH: ICD-10-PCS | Performed by: INTERNAL MEDICINE

## 2018-02-08 PROCEDURE — 25000125 ZZHC RX 250: Performed by: STUDENT IN AN ORGANIZED HEALTH CARE EDUCATION/TRAINING PROGRAM

## 2018-02-08 PROCEDURE — 27210787 ZZH MANIFOLD CR2

## 2018-02-08 PROCEDURE — 40000275 ZZH STATISTIC RCP TIME EA 10 MIN

## 2018-02-08 PROCEDURE — 27211181 ZZH BALLOON TIP PRESSURE CR5

## 2018-02-08 PROCEDURE — 25000132 ZZH RX MED GY IP 250 OP 250 PS 637: Performed by: INTERNAL MEDICINE

## 2018-02-08 PROCEDURE — C1894 INTRO/SHEATH, NON-LASER: HCPCS

## 2018-02-08 PROCEDURE — 27210946 ZZH KIT HC TOTES DISP CR8

## 2018-02-08 PROCEDURE — C1769 GUIDE WIRE: HCPCS

## 2018-02-08 PROCEDURE — 25000128 H RX IP 250 OP 636: Performed by: STUDENT IN AN ORGANIZED HEALTH CARE EDUCATION/TRAINING PROGRAM

## 2018-02-08 PROCEDURE — 27211089 ZZH KIT ACIST INJECTOR CR3

## 2018-02-08 PROCEDURE — 25000132 ZZH RX MED GY IP 250 OP 250 PS 637: Performed by: STUDENT IN AN ORGANIZED HEALTH CARE EDUCATION/TRAINING PROGRAM

## 2018-02-08 PROCEDURE — 21400006 ZZH R&B CCU INTERMEDIATE UMMC

## 2018-02-08 PROCEDURE — C1887 CATHETER, GUIDING: HCPCS

## 2018-02-08 PROCEDURE — 27210843 ZZH DEVICE COMPRESSION CR12

## 2018-02-08 PROCEDURE — 93571 IV DOP VEL&/PRESS C FLO 1ST: CPT

## 2018-02-08 PROCEDURE — 83735 ASSAY OF MAGNESIUM: CPT | Performed by: INTERNAL MEDICINE

## 2018-02-08 PROCEDURE — 99152 MOD SED SAME PHYS/QHP 5/>YRS: CPT

## 2018-02-08 PROCEDURE — 27210856 ZZH ACCESS HEART CATH CR2

## 2018-02-08 PROCEDURE — 27210762 ZZH DEVICE SUTURELESS SECUREMENT EA CR2

## 2018-02-08 PROCEDURE — 4A033BC MEASUREMENT OF ARTERIAL PRESSURE, CORONARY, PERCUTANEOUS APPROACH: ICD-10-PCS | Performed by: INTERNAL MEDICINE

## 2018-02-08 PROCEDURE — 25000128 H RX IP 250 OP 636: Performed by: INTERNAL MEDICINE

## 2018-02-08 PROCEDURE — 93460 R&L HRT ART/VENTRICLE ANGIO: CPT

## 2018-02-08 PROCEDURE — 99233 SBSQ HOSP IP/OBS HIGH 50: CPT | Mod: 25 | Performed by: INTERNAL MEDICINE

## 2018-02-08 PROCEDURE — 25000125 ZZHC RX 250: Performed by: INTERNAL MEDICINE

## 2018-02-08 PROCEDURE — 93005 ELECTROCARDIOGRAM TRACING: CPT

## 2018-02-08 PROCEDURE — 85347 COAGULATION TIME ACTIVATED: CPT

## 2018-02-08 PROCEDURE — 99153 MOD SED SAME PHYS/QHP EA: CPT

## 2018-02-08 PROCEDURE — 93456 R HRT CORONARY ARTERY ANGIO: CPT | Mod: 26 | Performed by: INTERNAL MEDICINE

## 2018-02-08 PROCEDURE — B2111ZZ FLUOROSCOPY OF MULTIPLE CORONARY ARTERIES USING LOW OSMOLAR CONTRAST: ICD-10-PCS | Performed by: INTERNAL MEDICINE

## 2018-02-08 RX ORDER — PROTAMINE SULFATE 10 MG/ML
25-100 INJECTION, SOLUTION INTRAVENOUS EVERY 5 MIN PRN
Status: DISCONTINUED | OUTPATIENT
Start: 2018-02-08 | End: 2018-02-08 | Stop reason: HOSPADM

## 2018-02-08 RX ORDER — ARGATROBAN 1 MG/ML
350 INJECTION, SOLUTION INTRAVENOUS
Status: DISCONTINUED | OUTPATIENT
Start: 2018-02-08 | End: 2018-02-08 | Stop reason: HOSPADM

## 2018-02-08 RX ORDER — HYDRALAZINE HYDROCHLORIDE 20 MG/ML
10-20 INJECTION INTRAMUSCULAR; INTRAVENOUS
Status: DISCONTINUED | OUTPATIENT
Start: 2018-02-08 | End: 2018-02-08 | Stop reason: HOSPADM

## 2018-02-08 RX ORDER — FENTANYL CITRATE 50 UG/ML
25-50 INJECTION, SOLUTION INTRAMUSCULAR; INTRAVENOUS
Status: ACTIVE | OUTPATIENT
Start: 2018-02-08 | End: 2018-02-09

## 2018-02-08 RX ORDER — CLOPIDOGREL BISULFATE 75 MG/1
75 TABLET ORAL
Status: DISCONTINUED | OUTPATIENT
Start: 2018-02-08 | End: 2018-02-08 | Stop reason: HOSPADM

## 2018-02-08 RX ORDER — LIDOCAINE HYDROCHLORIDE 10 MG/ML
30 INJECTION, SOLUTION EPIDURAL; INFILTRATION; INTRACAUDAL; PERINEURAL
Status: DISCONTINUED | OUTPATIENT
Start: 2018-02-08 | End: 2018-02-08 | Stop reason: HOSPADM

## 2018-02-08 RX ORDER — ATROPINE SULFATE 0.1 MG/ML
.5-1 INJECTION INTRAVENOUS
Status: DISCONTINUED | OUTPATIENT
Start: 2018-02-08 | End: 2018-02-08 | Stop reason: HOSPADM

## 2018-02-08 RX ORDER — PRASUGREL 10 MG/1
10-60 TABLET, FILM COATED ORAL
Status: DISCONTINUED | OUTPATIENT
Start: 2018-02-08 | End: 2018-02-08 | Stop reason: HOSPADM

## 2018-02-08 RX ORDER — NALOXONE HYDROCHLORIDE 0.4 MG/ML
.1-.4 INJECTION, SOLUTION INTRAMUSCULAR; INTRAVENOUS; SUBCUTANEOUS
Status: DISCONTINUED | OUTPATIENT
Start: 2018-02-08 | End: 2018-02-14 | Stop reason: HOSPADM

## 2018-02-08 RX ORDER — EPINEPHRINE 1 MG/ML
0.3 INJECTION, SOLUTION, CONCENTRATE INTRAVENOUS
Status: DISCONTINUED | OUTPATIENT
Start: 2018-02-08 | End: 2018-02-08 | Stop reason: HOSPADM

## 2018-02-08 RX ORDER — NITROGLYCERIN 0.4 MG/1
0.4 TABLET SUBLINGUAL EVERY 5 MIN PRN
Status: DISCONTINUED | OUTPATIENT
Start: 2018-02-08 | End: 2018-02-08 | Stop reason: HOSPADM

## 2018-02-08 RX ORDER — METHYLPREDNISOLONE SODIUM SUCCINATE 125 MG/2ML
125 INJECTION, POWDER, LYOPHILIZED, FOR SOLUTION INTRAMUSCULAR; INTRAVENOUS
Status: DISCONTINUED | OUTPATIENT
Start: 2018-02-08 | End: 2018-02-08 | Stop reason: HOSPADM

## 2018-02-08 RX ORDER — ASPIRIN 325 MG
325 TABLET ORAL
Status: DISCONTINUED | OUTPATIENT
Start: 2018-02-08 | End: 2018-02-08 | Stop reason: HOSPADM

## 2018-02-08 RX ORDER — PROTAMINE SULFATE 10 MG/ML
1-5 INJECTION, SOLUTION INTRAVENOUS
Status: DISCONTINUED | OUTPATIENT
Start: 2018-02-08 | End: 2018-02-08 | Stop reason: HOSPADM

## 2018-02-08 RX ORDER — EPTIFIBATIDE 2 MG/ML
1 INJECTION, SOLUTION INTRAVENOUS CONTINUOUS PRN
Status: DISCONTINUED | OUTPATIENT
Start: 2018-02-08 | End: 2018-02-08 | Stop reason: HOSPADM

## 2018-02-08 RX ORDER — NITROGLYCERIN 5 MG/ML
100-500 VIAL (ML) INTRAVENOUS
Status: COMPLETED | OUTPATIENT
Start: 2018-02-08 | End: 2018-02-08

## 2018-02-08 RX ORDER — POTASSIUM CHLORIDE 750 MG/1
40 TABLET, EXTENDED RELEASE ORAL
Status: DISCONTINUED | OUTPATIENT
Start: 2018-02-08 | End: 2018-02-08 | Stop reason: HOSPADM

## 2018-02-08 RX ORDER — ONDANSETRON 2 MG/ML
4 INJECTION INTRAMUSCULAR; INTRAVENOUS EVERY 4 HOURS PRN
Status: DISCONTINUED | OUTPATIENT
Start: 2018-02-08 | End: 2018-02-08 | Stop reason: HOSPADM

## 2018-02-08 RX ORDER — DEXTROSE MONOHYDRATE 25 G/50ML
12.5-5 INJECTION, SOLUTION INTRAVENOUS EVERY 30 MIN PRN
Status: DISCONTINUED | OUTPATIENT
Start: 2018-02-08 | End: 2018-02-08 | Stop reason: HOSPADM

## 2018-02-08 RX ORDER — ARGATROBAN 1 MG/ML
150 INJECTION, SOLUTION INTRAVENOUS
Status: DISCONTINUED | OUTPATIENT
Start: 2018-02-08 | End: 2018-02-08 | Stop reason: HOSPADM

## 2018-02-08 RX ORDER — FLUMAZENIL 0.1 MG/ML
0.2 INJECTION, SOLUTION INTRAVENOUS
Status: ACTIVE | OUTPATIENT
Start: 2018-02-08 | End: 2018-02-09

## 2018-02-08 RX ORDER — SODIUM NITROPRUSSIDE 25 MG/ML
100-200 INJECTION INTRAVENOUS
Status: DISCONTINUED | OUTPATIENT
Start: 2018-02-08 | End: 2018-02-08 | Stop reason: HOSPADM

## 2018-02-08 RX ORDER — DIPHENHYDRAMINE HYDROCHLORIDE 50 MG/ML
25-50 INJECTION INTRAMUSCULAR; INTRAVENOUS
Status: DISCONTINUED | OUTPATIENT
Start: 2018-02-08 | End: 2018-02-08 | Stop reason: HOSPADM

## 2018-02-08 RX ORDER — FENTANYL CITRATE 50 UG/ML
25-50 INJECTION, SOLUTION INTRAMUSCULAR; INTRAVENOUS
Status: DISCONTINUED | OUTPATIENT
Start: 2018-02-08 | End: 2018-02-08 | Stop reason: HOSPADM

## 2018-02-08 RX ORDER — PROMETHAZINE HYDROCHLORIDE 25 MG/ML
6.25-25 INJECTION, SOLUTION INTRAMUSCULAR; INTRAVENOUS EVERY 4 HOURS PRN
Status: DISCONTINUED | OUTPATIENT
Start: 2018-02-08 | End: 2018-02-08 | Stop reason: HOSPADM

## 2018-02-08 RX ORDER — FUROSEMIDE 10 MG/ML
20-100 INJECTION INTRAMUSCULAR; INTRAVENOUS
Status: DISCONTINUED | OUTPATIENT
Start: 2018-02-08 | End: 2018-02-08 | Stop reason: HOSPADM

## 2018-02-08 RX ORDER — LIDOCAINE 40 MG/G
CREAM TOPICAL
Status: DISCONTINUED | OUTPATIENT
Start: 2018-02-08 | End: 2018-02-14 | Stop reason: HOSPADM

## 2018-02-08 RX ORDER — IOPAMIDOL 755 MG/ML
330 INJECTION, SOLUTION INTRAVASCULAR ONCE
Status: COMPLETED | OUTPATIENT
Start: 2018-02-08 | End: 2018-02-08

## 2018-02-08 RX ORDER — POTASSIUM CHLORIDE 7.45 MG/ML
10 INJECTION INTRAVENOUS
Status: DISCONTINUED | OUTPATIENT
Start: 2018-02-08 | End: 2018-02-08 | Stop reason: HOSPADM

## 2018-02-08 RX ORDER — NALOXONE HYDROCHLORIDE 0.4 MG/ML
0.4 INJECTION, SOLUTION INTRAMUSCULAR; INTRAVENOUS; SUBCUTANEOUS EVERY 5 MIN PRN
Status: DISCONTINUED | OUTPATIENT
Start: 2018-02-08 | End: 2018-02-08 | Stop reason: HOSPADM

## 2018-02-08 RX ORDER — ATROPINE SULFATE 0.1 MG/ML
0.5 INJECTION INTRAVENOUS EVERY 5 MIN PRN
Status: ACTIVE | OUTPATIENT
Start: 2018-02-08 | End: 2018-02-09

## 2018-02-08 RX ORDER — NITROGLYCERIN 20 MG/100ML
.07-2 INJECTION INTRAVENOUS CONTINUOUS PRN
Status: DISCONTINUED | OUTPATIENT
Start: 2018-02-08 | End: 2018-02-08 | Stop reason: HOSPADM

## 2018-02-08 RX ORDER — LIDOCAINE 40 MG/G
CREAM TOPICAL
Status: DISCONTINUED | OUTPATIENT
Start: 2018-02-08 | End: 2018-02-08 | Stop reason: HOSPADM

## 2018-02-08 RX ORDER — NICARDIPINE HYDROCHLORIDE 2.5 MG/ML
100 INJECTION INTRAVENOUS
Status: DISCONTINUED | OUTPATIENT
Start: 2018-02-08 | End: 2018-02-08 | Stop reason: HOSPADM

## 2018-02-08 RX ORDER — ASPIRIN 81 MG/1
81-324 TABLET, CHEWABLE ORAL
Status: DISCONTINUED | OUTPATIENT
Start: 2018-02-08 | End: 2018-02-08 | Stop reason: HOSPADM

## 2018-02-08 RX ORDER — ENALAPRILAT 1.25 MG/ML
1.25-2.5 INJECTION INTRAVENOUS
Status: DISCONTINUED | OUTPATIENT
Start: 2018-02-08 | End: 2018-02-08 | Stop reason: HOSPADM

## 2018-02-08 RX ORDER — METOPROLOL TARTRATE 1 MG/ML
5 INJECTION, SOLUTION INTRAVENOUS EVERY 5 MIN PRN
Status: DISCONTINUED | OUTPATIENT
Start: 2018-02-08 | End: 2018-02-08 | Stop reason: HOSPADM

## 2018-02-08 RX ORDER — SODIUM CHLORIDE 9 MG/ML
INJECTION, SOLUTION INTRAVENOUS CONTINUOUS
Status: DISCONTINUED | OUTPATIENT
Start: 2018-02-08 | End: 2018-02-08 | Stop reason: HOSPADM

## 2018-02-08 RX ORDER — POTASSIUM CHLORIDE 750 MG/1
20 TABLET, EXTENDED RELEASE ORAL
Status: DISCONTINUED | OUTPATIENT
Start: 2018-02-08 | End: 2018-02-08 | Stop reason: HOSPADM

## 2018-02-08 RX ORDER — FLUMAZENIL 0.1 MG/ML
0.2 INJECTION, SOLUTION INTRAVENOUS
Status: DISCONTINUED | OUTPATIENT
Start: 2018-02-08 | End: 2018-02-08 | Stop reason: HOSPADM

## 2018-02-08 RX ORDER — DOBUTAMINE HYDROCHLORIDE 200 MG/100ML
2-20 INJECTION INTRAVENOUS CONTINUOUS PRN
Status: DISCONTINUED | OUTPATIENT
Start: 2018-02-08 | End: 2018-02-08 | Stop reason: HOSPADM

## 2018-02-08 RX ORDER — ADENOSINE 3 MG/ML
12-12000 INJECTION, SOLUTION INTRAVENOUS
Status: DISCONTINUED | OUTPATIENT
Start: 2018-02-08 | End: 2018-02-08 | Stop reason: HOSPADM

## 2018-02-08 RX ORDER — PHENYLEPHRINE HCL IN 0.9% NACL 1 MG/10 ML
20-100 SYRINGE (ML) INTRAVENOUS
Status: DISCONTINUED | OUTPATIENT
Start: 2018-02-08 | End: 2018-02-08 | Stop reason: HOSPADM

## 2018-02-08 RX ORDER — DOPAMINE HYDROCHLORIDE 160 MG/100ML
2-20 INJECTION, SOLUTION INTRAVENOUS CONTINUOUS PRN
Status: DISCONTINUED | OUTPATIENT
Start: 2018-02-08 | End: 2018-02-08 | Stop reason: HOSPADM

## 2018-02-08 RX ORDER — POTASSIUM CHLORIDE 29.8 MG/ML
20 INJECTION INTRAVENOUS
Status: DISCONTINUED | OUTPATIENT
Start: 2018-02-08 | End: 2018-02-08 | Stop reason: HOSPADM

## 2018-02-08 RX ORDER — MAGNESIUM HYDROXIDE 1200 MG/15ML
1000 LIQUID ORAL CONTINUOUS PRN
Status: DISCONTINUED | OUTPATIENT
Start: 2018-02-08 | End: 2018-02-08 | Stop reason: HOSPADM

## 2018-02-08 RX ORDER — NALOXONE HYDROCHLORIDE 0.4 MG/ML
.2-.4 INJECTION, SOLUTION INTRAMUSCULAR; INTRAVENOUS; SUBCUTANEOUS
Status: ACTIVE | OUTPATIENT
Start: 2018-02-08 | End: 2018-02-09

## 2018-02-08 RX ORDER — NIFEDIPINE 10 MG/1
10 CAPSULE ORAL
Status: DISCONTINUED | OUTPATIENT
Start: 2018-02-08 | End: 2018-02-08 | Stop reason: HOSPADM

## 2018-02-08 RX ORDER — LORAZEPAM 2 MG/ML
.5-2 INJECTION INTRAMUSCULAR EVERY 4 HOURS PRN
Status: DISCONTINUED | OUTPATIENT
Start: 2018-02-08 | End: 2018-02-08 | Stop reason: HOSPADM

## 2018-02-08 RX ORDER — EPTIFIBATIDE 2 MG/ML
180 INJECTION, SOLUTION INTRAVENOUS EVERY 10 MIN PRN
Status: DISCONTINUED | OUTPATIENT
Start: 2018-02-08 | End: 2018-02-08 | Stop reason: HOSPADM

## 2018-02-08 RX ORDER — VERAPAMIL HYDROCHLORIDE 2.5 MG/ML
1-5 INJECTION, SOLUTION INTRAVENOUS
Status: DISCONTINUED | OUTPATIENT
Start: 2018-02-08 | End: 2018-02-08 | Stop reason: CLARIF

## 2018-02-08 RX ORDER — HEPARIN SODIUM 1000 [USP'U]/ML
1000-10000 INJECTION, SOLUTION INTRAVENOUS; SUBCUTANEOUS EVERY 5 MIN PRN
Status: DISCONTINUED | OUTPATIENT
Start: 2018-02-08 | End: 2018-02-08 | Stop reason: HOSPADM

## 2018-02-08 RX ORDER — CLOPIDOGREL BISULFATE 75 MG/1
300-600 TABLET ORAL
Status: DISCONTINUED | OUTPATIENT
Start: 2018-02-08 | End: 2018-02-08 | Stop reason: HOSPADM

## 2018-02-08 RX ORDER — NITROGLYCERIN 5 MG/ML
100-200 VIAL (ML) INTRAVENOUS
Status: DISCONTINUED | OUTPATIENT
Start: 2018-02-08 | End: 2018-02-08 | Stop reason: HOSPADM

## 2018-02-08 RX ADMIN — MIDAZOLAM 0.5 MG: 1 INJECTION INTRAMUSCULAR; INTRAVENOUS at 19:46

## 2018-02-08 RX ADMIN — HEPARIN SODIUM 5000 UNITS: 5000 INJECTION, SOLUTION INTRAVENOUS; SUBCUTANEOUS at 11:44

## 2018-02-08 RX ADMIN — HEPARIN SODIUM 1000 UNITS: 1000 INJECTION, SOLUTION INTRAVENOUS; SUBCUTANEOUS at 20:16

## 2018-02-08 RX ADMIN — ATORVASTATIN CALCIUM 40 MG: 40 TABLET, FILM COATED ORAL at 08:09

## 2018-02-08 RX ADMIN — OMEPRAZOLE 20 MG: 20 CAPSULE, DELAYED RELEASE ORAL at 23:21

## 2018-02-08 RX ADMIN — HEPARIN SODIUM 7500 UNITS: 1000 INJECTION, SOLUTION INTRAVENOUS; SUBCUTANEOUS at 20:01

## 2018-02-08 RX ADMIN — ALLOPURINOL 400 MG: 300 TABLET ORAL at 08:08

## 2018-02-08 RX ADMIN — PREDNISONE 5 MG: 5 TABLET ORAL at 08:09

## 2018-02-08 RX ADMIN — FENTANYL CITRATE 50 MCG: 50 INJECTION, SOLUTION INTRAMUSCULAR; INTRAVENOUS at 18:44

## 2018-02-08 RX ADMIN — Medication 500 UNITS: at 18:45

## 2018-02-08 RX ADMIN — FENTANYL CITRATE 25 MCG: 50 INJECTION, SOLUTION INTRAMUSCULAR; INTRAVENOUS at 19:04

## 2018-02-08 RX ADMIN — MIDAZOLAM 0.5 MG: 1 INJECTION INTRAMUSCULAR; INTRAVENOUS at 19:04

## 2018-02-08 RX ADMIN — IOPAMIDOL 330 ML: 755 INJECTION, SOLUTION INTRAVASCULAR at 21:15

## 2018-02-08 RX ADMIN — ADENOSINE 140 MCG/KG/MIN: 3 INJECTION, SOLUTION INTRAVENOUS at 20:49

## 2018-02-08 RX ADMIN — ASPIRIN 325 MG: 325 TABLET, DELAYED RELEASE ORAL at 05:49

## 2018-02-08 RX ADMIN — Medication 1500 UNITS: at 18:44

## 2018-02-08 RX ADMIN — NITROGLYCERIN 100 MCG: 5 INJECTION, SOLUTION INTRAVENOUS at 19:22

## 2018-02-08 RX ADMIN — FENTANYL CITRATE 25 MCG: 50 INJECTION, SOLUTION INTRAMUSCULAR; INTRAVENOUS at 19:46

## 2018-02-08 RX ADMIN — HEPARIN SODIUM 5000 UNITS: 1000 INJECTION, SOLUTION INTRAVENOUS; SUBCUTANEOUS at 19:15

## 2018-02-08 RX ADMIN — MIDAZOLAM 1 MG: 1 INJECTION INTRAMUSCULAR; INTRAVENOUS at 18:45

## 2018-02-08 RX ADMIN — VERAPAMIL HYDROCHLORIDE 2.5 MG: 2.5 INJECTION, SOLUTION INTRAVENOUS at 19:23

## 2018-02-08 RX ADMIN — FENTANYL CITRATE 25 MCG: 50 INJECTION, SOLUTION INTRAMUSCULAR; INTRAVENOUS at 21:15

## 2018-02-08 NOTE — PLAN OF CARE
Problem: Patient Care Overview  Goal: Plan of Care/Patient Progress Review  D: Admitted on 2/2/18 for HF EF 10-15% and worsening MEADE.  I: Monitored vitals and assessed pt status.   Changed  PRN:  A:  Pt is A & 0x 4. Pleasant and cooperative. Denies any discomfort. Up with SBA. Afebrile,  VSS expect -120 ST on the monitor.  Pt weaned to R/A, sating 94-97% denies any SOB, put on 2L 02 after dialysis for comfort. 2L fluid restriction. Good appetite.  Blood sugar checks,  on SSI, denies any nausea or vomiting. Went dialysis, no fluids were removed due low blood pressures.  Reports feeling tired after dialysis session. Pt is oliguric. Will be NPO for RHC and LHC for tomorrow.   P:  Continue to monitor Pt status and report changes to treatment team of Cards 2.

## 2018-02-08 NOTE — PROGRESS NOTES
"  Nephrology Progress Note  02/08/2018         Assessment & Recommendations:   Murray Nicholson is a 63 year old year old male    ESKD: due to DM, on PD since Aug 2017, changed to IHD 1/2018, due to peritonitis polymicrobial and fungal, now schedule TTS, at [facility] under care of Dr Mcpherson, via RIJ tunnel, 3hr, EDW 80kg, now on daily dialysis to challenge his dry weight due to heart failure volume overload and inc PA pressure.    - will plan for more dialysis after repeated Rt heart cath, will discuss with cardiology team to see if we need to cont daily dialysis for fluid removal.     BP and volume: BP is low normal, but will discuss with cardiology to see if it is possible to start low dose BB and ACEI, volume status looks eovolemic now but will wait for right heart cath and possible placing SGC reading to manage more volume.     Electrolyte: K 4.3, Na 137 no issue, Mg 1.9 keep mg close to 2.    Acid base:  bicarb 23 no issue.     Anemia: Hgb 10.1, will repeat tomorrow and if sll low will cont EPO with dialysis     BMD: Ca 9.2 no phos recorded, cont Hectorol 1 MCG 3 xwk  ith dialysis . Cont Phoslo for now.     HErEF: EF down to 10 to 15% from 20 to 25, left cath not done is right Cath shows   Scheduled for right and left heart cath.     Recommendations were communicated to primary team via note    Seen and discussed with Dr. Shilpi Saunders MD   [phone]    Interval History :   In the last 24 hours Murray Nicholson feels fine denies any complaint, sob improved but still get worse with minimal activity.   Review of Systems:   I reviewed the following systems:  GI: good  appetite. no nausea or vomiting or diarrhea.   Neuro:  no confusion  Constitutional:  no fever or chills  CV: no dyspnea or edema.  no chest pain.    Physical Exam:       /72 (BP Location: Right arm)  Pulse 113  Temp 98.4  F (36.9  C) (Oral)  Resp 16  Ht 1.753 m (5' 9\")  Wt 73.1 kg (161 lb 3.2 oz)  SpO2 100%  BMI 23.81 kg/m2 "     GENERAL APPEARANCE: NAD  EYES:  no scleral icterus, pupils equal  HENT: mouth without ulcers or lesions  PULM: lungs clear to auscultation,  bilaterally, equal air movement, no clubbing  CV: regular rhythm, normal rate, no rub     -JVD not elevated      -edema none   GI: soft, nottender, nondistended, bowel sounds are present  INTEGUMENT: no cyanosis, no rash  NEURO:  no asterixis   Access RIJ    Labs:   All labs reviewed by me  Electrolytes/Renal -   Recent Labs   Lab Test  02/08/18   0631  02/07/18   0718  02/06/18   0612   01/12/18   0742   11/15/17   0758  11/14/17   0645   NA  137  137  138   < >  137   < >  144  141   POTASSIUM  4.3  5.0  4.3   < >  3.5   < >  3.2*  3.7   CHLORIDE  102  102  103   < >  98   < >  103  103   CO2  23  24  24   < >  32   < >  30  27   BUN  23  30  32*   < >  38*   < >  69*  76*   CR  4.59*  5.08*  5.22*   < >  8.27*   < >  6.98*  6.85*   GLC  176*  168*  162*   < >  250*   < >  294*  312*   TRINI  9.2  9.3  8.6   < >  8.4*   < >  8.9  8.9   MAG  1.9  2.1  2.0   < >  1.7   < >  1.9  1.8   PHOS   --    --    --    --   3.3   --   5.0*  4.3    < > = values in this interval not displayed.       CBC -   Recent Labs   Lab Test  02/06/18   0612  02/05/18   0629  02/03/18   0613   02/02/18   1736   WBC  8.3   --   8.4   --   7.4   HGB  10.1*   --   9.5*   --   9.1*   PLT  192  213  252   < >  229    < > = values in this interval not displayed.       LFTs -   Recent Labs   Lab Test  02/02/18   1736  01/26/18   1446  01/07/18   1655  11/13/17   1709   ALKPHOS  86  95  76  104   BILITOTAL  0.4  0.4  0.4  0.8   ALT  16  16  24  26   AST  20  23  32  22   PROTTOTAL  6.2*   --   6.0*  6.0*   ALBUMIN  2.1*   --   1.8*  2.3*       Iron Panel -   Recent Labs   Lab Test  07/19/17   1306  07/05/17   1204  06/21/17   1058   IRON  46  26*  69   IRONSAT  18  12*  29   ALBERTINA  369  542*  557*         Imaging:  All imaging studies reviewed by me.     Current Medications:    sodium chloride (PF)  3 mL  Intracatheter Q8H     sodium chloride (PF)  3 mL Intracatheter Q8H     insulin aspart  1-7 Units Subcutaneous TID AC     insulin aspart  1-5 Units Subcutaneous At Bedtime     allopurinol  400 mg Oral Daily     heparin  5,000 Units Subcutaneous Q12H     aspirin  81 mg Oral Daily     atorvastatin  40 mg Oral Daily     calcium acetate  667 mg Oral TID w/meals     omeprazole  20 mg Oral At Bedtime     predniSONE  5 mg Oral Daily       NaCl       MD TIFFANY Powell, Russel Dobbins, saw this patient with Dr. Saunders and agree with the findings and plan of care as documented in the note.

## 2018-02-08 NOTE — PLAN OF CARE
"Problem: Cardiac: Heart Failure (Adult)  Goal: Signs and Symptoms of Listed Potential Problems Will be Absent, Minimized or Managed (Cardiac: Heart Failure)  Signs and symptoms of listed potential problems will be absent, minimized or managed by discharge/transition of care (reference Cardiac: Heart Failure (Adult) CPG).   Outcome: No Change   02/07/18 2300   Cardiac: Heart Failure   Problems Assessed (Heart Failure) all   Problems Present (Heart Failure) cardiac pump dysfunction;decreased quality of life   BP 96/67 (BP Location: Right arm, Cuff Size: Adult Regular)  Pulse 113  Temp 98.2  F (36.8  C) (Oral)  Resp 16  Ht 1.753 m (5' 9\")  Wt 73.1 kg (161 lb 3.2 oz)  SpO2 100%  BMI 23.81 kg/m2    D: Heart Failure with EF of 10%-15%.  MEADE and baseline Tachycardia in 110s.    I: Monitored vitals and assessed pt status. AVSS and on dialysis T, Th and Fr.  Patient is NPO for possible RHC.    Changed:n/a  Running:n/a  PRN:Tramadol for back and shoulder pain.    A: A0x4 and using oxygen intermittently.  BS at HS was 220 and no insulin was given d/t the NPO status.  EKG done and waiting for AM labs.      P: Continue to monitor Pt status and report changes to treatment team.            "

## 2018-02-08 NOTE — PROGRESS NOTES
Cardiology Progress Note    Assessment & Plan   63 year old male with a history of non-obstructive CAD, HFrEF due to ICM EF 10-15%, functionally bicuspid AV with ascending aortic aneurysm, ESRD on HD, HTN, T2DM who presented with worsening MEADE & orthopnea in the setting of worsening EF, found to have LVEDP of 41 PCWP of 37 CO 3.1 CI 1.6 being dialyzed to achieve euvolemia.     Plan for today  - right and left heart cath- pending results if wedge still elevated further fluid removal per nephrology with CRRT or inotropes       Acute on Chronic HFrEF due to ICM (EF 10-15%)   Patient's LVEF decreased from 40-45% on Nov 2016 to 20-25% in April 2017 and now 10-15%.  - Echo showed EF 10-15% severe LV dilation, global reduced RV function, mod MR, mild TR and moderate AI  -Last angiogram was 2006 with 60-70% LAD lesion in dLAD (FFR 0.85) and 30% pRCA, right dominant.   - HF management limited by hypotension during dialysis.   - RHC on 2/3 RA 12, RV 56/18 PCWP 37 CO 3.1 CI 1.6 SVR 1645 PVR 2.8, LVEDP 41  - Nephrology consulted for dialysis       -->right and left heart cath today 2/8, will consider leaving [place] in place for better hemodynamic monitoring    --> Strict Is & Os, daily weights, 2L fluid restriction    - CAD:                         > Aspirin 81 mg daily                         > Atorvastatin 40 mg daily                         > LHC today if LVEDP has improved     ESRD on HD  Patient was previously on PD since summer 2017, developed peritonitis in Jan 2017 and switched to HD (T-Th-Sat). ESRD is likely secondary to HTN and T2DM. Has tunneled catheter for his access.   - Nephrology consult, appreciate recs    --> Dialysis     T2DM  Last HgbA1c 8.6 on 2/2/18, increased from 6.4 on 6/27/17.   - Hold home glipizide  -  medium intensity SSI     Gout  - Continue PTA prednisone 5 mg daily  - Continue PTA allopurinol 400 mg daily     Normocytic Anemia  Hgb stable around 9-10g/dL. Likely secondary to ESRD.  - Continue  "Epogen 4000 units Q3 weeks, started 2/6 per renal     FEN  Active Diet Order-2 Gram Sodium Diet, 2L fluid restriction  - PRN lytes replacement   DVT Prophylaxis: heparin SC  Code Status: Full Code      Roxanna Soriano  Cardiology fellow, PGY-4    Interval History   Dialyzed yesterday for 3hrs though report of no UF given hypotension  Feels well was able to walk small distances without shortness of breath      Physical Exam   Temp: 98.2  F (36.8  C) Temp src: Oral BP: 96/67 Pulse: 113 Heart Rate: 114 Resp: 16 SpO2: 100 % O2 Device: None (Room air) Oxygen Delivery: 2 LPM  Vitals:    02/07/18 1330 02/07/18 1734 02/08/18 0544   Weight: 72.8 kg (160 lb 7.9 oz) 73.6 kg (162 lb 3.2 oz) 73.1 kg (161 lb 3.2 oz)     Vital Signs with Ranges  Temp:  [97.6  F (36.4  C)-98.2  F (36.8  C)] 98.2  F (36.8  C)  Pulse:  [113-114] 113  Heart Rate:  [108-120] 114  Resp:  [16] 16  BP: ()/(37-75) 96/67  Cuff Mean (mmHg):  [] 76  SpO2:  [97 %-100 %] 100 %  I/O last 3 completed shifts:  In: 480 [P.O.:480]  Out: 0     Heart Rate: 114, Blood pressure 96/67, pulse 113, temperature 98.2  F (36.8  C), temperature source Oral, resp. rate 16, height 1.753 m (5' 9\"), weight 73.1 kg (161 lb 3.2 oz), SpO2 100 %.  161 lbs 3.2 oz  GEN:  Alert, oriented x 3, appears comfortable, NAD.  CV:  Regular rate and rhythm, S3 elevated JVD to tragus.  LUNGS:  Clear to auscultation bilaterally   ABD:  Active bowel sounds, soft, non-tender/non-distended.  No rebound/guarding/rigidity.  EXT:  No edema or cyanosis.      Medications     NaCl         sodium chloride (PF)  3 mL Intracatheter Q8H     sodium chloride (PF)  3 mL Intracatheter Q8H     insulin aspart  1-7 Units Subcutaneous TID AC     insulin aspart  1-5 Units Subcutaneous At Bedtime     allopurinol  400 mg Oral Daily     heparin  5,000 Units Subcutaneous Q12H     aspirin  81 mg Oral Daily     atorvastatin  40 mg Oral Daily     calcium acetate  667 mg Oral TID w/meals     omeprazole  " 20 mg Oral At Bedtime     predniSONE  5 mg Oral Daily       Data     Recent Labs  Lab 02/08/18  0631 02/07/18  0718 02/06/18  0612 02/05/18  0629  02/03/18  0613 02/02/18  1736   WBC  --   --  8.3  --   --  8.4  --  7.4   HGB  --   --  10.1*  --   --  9.5*  --  9.1*   MCV  --   --  100  --   --  102*  --  100   PLT  --   --  192 213  --  252  < > 229    137 138 138  < > 142  --  141   POTASSIUM 4.3 5.0 4.3 4.3  < > 4.4  --  4.4   CHLORIDE 102 102 103 102  < > 107  --  106   CO2 23 24 24 24  < > 25  --  22   BUN 23 30 32* 42*  < > 37*  --  30   CR 4.59* 5.08* 5.22* 6.39*  < > 6.41*  --  5.58*   ANIONGAP 12 11 11 12  < > 10  --  13   TRINI 9.2 9.3 8.6 8.8  < > 8.1*  --  8.2*   * 168* 162* 176*  < > 121*  --  240*   ALBUMIN  --   --   --   --   --   --   --  2.1*   PROTTOTAL  --   --   --   --   --   --   --  6.2*   BILITOTAL  --   --   --   --   --   --   --  0.4   ALKPHOS  --   --   --   --   --   --   --  86   ALT  --   --   --   --   --   --   --  16   AST  --   --   --   --   --   --   --  20   TROPI  --   --   --   --   --  0.247*  --  0.247*   < > = values in this interval not displayed.    No results found for this or any previous visit (from the past 24 hour(s)).      I have reviewed today's vital signs, notes, medications, labs and imaging.  I have also seen and examined the patient and agree with the findings and plan as outlined above.  Pt without complaints. VSS with 98.4,  and /75. Lungs clear. JVP 10 cm. S1 and S2 with II/VI HSM. Labs with Cr 4.59.  Assessment: Pt with LV dysfn and ESRD. Plan for RHC/LHC today.  If pt with increased LVEDP today will leave Amelia Jax catheter in place and manage in the ICU with CRRT.     Bobby Muse MD, PhD  Professor, Heart Failure and Cardiac Transplantation  [facility]

## 2018-02-09 LAB
GLUCOSE BLDC GLUCOMTR-MCNC: 119 MG/DL (ref 70–99)
GLUCOSE BLDC GLUCOMTR-MCNC: 178 MG/DL (ref 70–99)
GLUCOSE BLDC GLUCOMTR-MCNC: 243 MG/DL (ref 70–99)
GLUCOSE BLDC GLUCOMTR-MCNC: 257 MG/DL (ref 70–99)

## 2018-02-09 PROCEDURE — 25000132 ZZH RX MED GY IP 250 OP 250 PS 637: Performed by: INTERNAL MEDICINE

## 2018-02-09 PROCEDURE — 90937 HEMODIALYSIS REPEATED EVAL: CPT

## 2018-02-09 PROCEDURE — 25000128 H RX IP 250 OP 636: Performed by: INTERNAL MEDICINE

## 2018-02-09 PROCEDURE — 25000132 ZZH RX MED GY IP 250 OP 250 PS 637: Performed by: STUDENT IN AN ORGANIZED HEALTH CARE EDUCATION/TRAINING PROGRAM

## 2018-02-09 PROCEDURE — 21400006 ZZH R&B CCU INTERMEDIATE UMMC

## 2018-02-09 PROCEDURE — 99233 SBSQ HOSP IP/OBS HIGH 50: CPT | Mod: GC | Performed by: INTERNAL MEDICINE

## 2018-02-09 PROCEDURE — 25000125 ZZHC RX 250: Performed by: INTERNAL MEDICINE

## 2018-02-09 PROCEDURE — 00000146 ZZHCL STATISTIC GLUCOSE BY METER IP

## 2018-02-09 RX ORDER — HEPARIN SODIUM 1000 [USP'U]/ML
500 INJECTION, SOLUTION INTRAVENOUS; SUBCUTANEOUS
Status: COMPLETED | OUTPATIENT
Start: 2018-02-09 | End: 2018-02-09

## 2018-02-09 RX ORDER — METOPROLOL TARTRATE 25 MG/1
25 TABLET, FILM COATED ORAL 2 TIMES DAILY
Status: DISCONTINUED | OUTPATIENT
Start: 2018-02-09 | End: 2018-02-10

## 2018-02-09 RX ORDER — HEPARIN SODIUM 1000 [USP'U]/ML
500 INJECTION, SOLUTION INTRAVENOUS; SUBCUTANEOUS CONTINUOUS
Status: DISCONTINUED | OUTPATIENT
Start: 2018-02-09 | End: 2018-02-09

## 2018-02-09 RX ORDER — DOXERCALCIFEROL 4 UG/2ML
1 INJECTION INTRAVENOUS
Status: COMPLETED | OUTPATIENT
Start: 2018-02-09 | End: 2018-02-09

## 2018-02-09 RX ORDER — METOPROLOL SUCCINATE 25 MG/1
25 TABLET, EXTENDED RELEASE ORAL EVERY EVENING
Status: DISCONTINUED | OUTPATIENT
Start: 2018-02-09 | End: 2018-02-09

## 2018-02-09 RX ADMIN — DOXERCALCIFEROL 1 MCG: 4 INJECTION, SOLUTION INTRAVENOUS at 14:00

## 2018-02-09 RX ADMIN — ASPIRIN 81 MG CHEWABLE TABLET 81 MG: 81 TABLET CHEWABLE at 09:08

## 2018-02-09 RX ADMIN — TRAMADOL HYDROCHLORIDE 50 MG: 50 TABLET, COATED ORAL at 22:32

## 2018-02-09 RX ADMIN — CALCIUM ACETATE 667 MG: 667 CAPSULE ORAL at 13:38

## 2018-02-09 RX ADMIN — SODIUM CHLORIDE 250 ML: 9 INJECTION, SOLUTION INTRAVENOUS at 13:58

## 2018-02-09 RX ADMIN — Medication 500 UNITS: at 14:02

## 2018-02-09 RX ADMIN — PREDNISONE 5 MG: 5 TABLET ORAL at 09:08

## 2018-02-09 RX ADMIN — ACETAMINOPHEN 1000 MG: 500 TABLET, FILM COATED ORAL at 00:50

## 2018-02-09 RX ADMIN — CALCIUM ACETATE 667 MG: 667 CAPSULE ORAL at 18:52

## 2018-02-09 RX ADMIN — HEPARIN SODIUM 5000 UNITS: 5000 INJECTION, SOLUTION INTRAVENOUS; SUBCUTANEOUS at 11:07

## 2018-02-09 RX ADMIN — ATORVASTATIN CALCIUM 40 MG: 40 TABLET, FILM COATED ORAL at 09:08

## 2018-02-09 RX ADMIN — HEPARIN SODIUM 5000 UNITS: 5000 INJECTION, SOLUTION INTRAVENOUS; SUBCUTANEOUS at 22:33

## 2018-02-09 RX ADMIN — SENNOSIDES AND DOCUSATE SODIUM 2 TABLET: 8.6; 5 TABLET ORAL at 09:20

## 2018-02-09 RX ADMIN — SODIUM CHLORIDE 300 ML: 9 INJECTION, SOLUTION INTRAVENOUS at 13:58

## 2018-02-09 RX ADMIN — HEPARIN SODIUM 500 UNITS/HR: 1000 INJECTION, SOLUTION INTRAVENOUS; SUBCUTANEOUS at 14:03

## 2018-02-09 RX ADMIN — OMEPRAZOLE 20 MG: 20 CAPSULE, DELAYED RELEASE ORAL at 22:32

## 2018-02-09 RX ADMIN — CALCIUM ACETATE 667 MG: 667 CAPSULE ORAL at 09:08

## 2018-02-09 RX ADMIN — ALLOPURINOL 400 MG: 300 TABLET ORAL at 09:08

## 2018-02-09 NOTE — DISCHARGE INSTRUCTIONS
Discharge RN: Please fax discharge orders and nephrology dc summary to Kunal Lange (Fax: [phone])

## 2018-02-09 NOTE — PROGRESS NOTES
Cardiology Progress Note    Assessment & Plan      Murray Nicholson is a 63 year old male with a history of non-obstructive CAD, HFrEF due to ICM EF 10-15%, functionally bicuspid AV with ascending aortic aneurysm, ESRD on HD, HTN, T2DM who presented with worsening MEADE & orthopnea in the setting of worsening EF, underwent daily dialysis this week with improvement in PCWP 45 --> 15.    *Changes Today:   - Start metoprolol XL 25mg QHS  - Dialysis per nephrology      Acute on Chronic HFrEF due to ICM (EF 10-15%)   Patient's LVEF decreased from 40-45% on Nov 2016 to 20-25% in April 2017 and now 10-15%. It was thought that patient's valvular disease was not severe enough to cause this reduction in systolic function, but he was unable to have repeat angiogram due to CKD, he is now on HD. Last angiogram was 2006 with 60-70% LAD lesion in dLAD (FFR 0.85) and 30% pRCA, right dominant. Unable to tolerate HF medications recently due to hypotension during dialysis. Admitted with worsening MEADE and orthopnea concerning for hypervolemia, RHC on 2/3 with elevated elevated right (RV 56/18) and left-sided (PCWP 45) pressures. Cause of acute decompensation likely inability to remove adequate fluid with HD due to hypotension (only removing ~ 1.6-1.7 L each run). Repeat RHC on 2/8 with improved right and left-sided filling pressures (PCWP 15) and LHC with non-obstructive CAD.  - Nephrology consulted for dialysis, appreciate assistance    --> Dry weight challenged this admission - likely ~ 160 lbs, plan for dialysis today          **Per nephrology, will consider doing more frequent dialysis sessions with removal of less fluid each time to prevent hypotension    --> Strict Is & Os, daily weights, 2L fluid restriction  - BB: Start metoprolol XL 25mg QHS  - ACEi/ARB: Holding due to hypotension  - Afterload: Holding home hydralazine due to hypotension  - Aldosterone antagonist: ESRD  - CAD:                         > Aspirin 81 mg daily                          > Atorvastatin 40 mg daily     Bicuspid Aortic Valve  Ascending Aortic Aneurysm  Prior Echo with functional bicuspid aortic valve, most recent Echo 2/2/18 read as bicuspid aortic valve with moderate AI. Last cardiac MRI 3/4/14 with aortic root 4.5 x 4.5cm and proximal ascending aorta 4.1 x 4.3cm, not significantly different in size from 2013.  - Will need follow-up imaging to be arranged through cardiology clinic    ESRD on HD  Patient was previously on PD since summer 2017, developed peritonitis in Jan 2017 and switched to HD (T-Th-Sat). ESRD is likely secondary to HTN and T2DM. Has tunneled catheter for his access.   - Nephrology consult, appreciate recs    --> Dialysis today     T2DM  Last HgbA1c 8.6 on 2/2/18, increased from 6.4 on 6/27/17.   - Hold home glipizide  - Hypoglycemia protocol  - Increased to medium intensity SSI  **Diabetes education completed this admission. Will need to follow-up closely with primary care.     Gout  - Continue PTA prednisone 5 mg daily  - Continue PTA allopurinol 400 mg daily     Normocytic Anemia  Hgb stable around 9-10g/dL. Likely secondary to ESRD.  - Continue Epogen 4000 units Q3 weeks, started 2/6 per renal     FEN  -   Active Diet Order      2 Gram Sodium Diet, 2L fluid restriction  - PRN lytes replacement     Prophy/Misc: VTE: Heparin SQ  Lines: HD line, PIV  Code status: Full Code  Disposition: Likely discharge to home tomorrow, will need close follow-up with [facility] and primary care    Patient was seen and discussed with Dr. Osmani Keen.    Mellisa Brunner  Internal Medicine, PGY3  [phone]    Interval History     No acute events overnight. Mr. Nicholson underwent right and left heart cath yesterday evening which showed improved right and left-sided filling pressures and non-obstructive CAD. He continues to reports some dyspnea but has had normal SpO2 on room air. He remains hemodynamically stable with HR remaining around  "110.    ----------------------------------------------------------------------------------------------------------------------------------------------------------------------    Physical Exam   Temp: 97.8  F (36.6  C) Temp src: Oral BP: 92/70 Pulse: 119 Heart Rate: 109 Resp: 16 SpO2: 100 % O2 Device: Nasal cannula Oxygen Delivery: 2 LPM  Vitals:    02/07/18 1330 02/07/18 1734 02/08/18 0544   Weight: 72.8 kg (160 lb 7.9 oz) 73.6 kg (162 lb 3.2 oz) 73.1 kg (161 lb 3.2 oz)     Vital Signs with Ranges  Temp:  [97.8  F (36.6  C)-98.4  F (36.9  C)] 97.8  F (36.6  C)  Pulse:  [113-119] 119  Heart Rate:  [104-118] 109  Resp:  [15-16] 16  BP: ()/(63-87) 92/70  Cuff Mean (mmHg):  [80-82] 80  SpO2:  [95 %-100 %] 100 %  I/O last 3 completed shifts:  In: 698 [I.V.:350; Other:348]  Out: -     Heart Rate: 109, Blood pressure 92/70, pulse 119, temperature 97.8  F (36.6  C), temperature source Oral, resp. rate 16, height 1.753 m (5' 9\"), weight 73.1 kg (161 lb 3.2 oz), SpO2 100 %.  161 lbs 3.2 oz  GEN: Alert & oriented, appears comfortable, NAD  CV: Tachycardic, regular rhythm, S3+, JVP to 7-8cm  LUNGS: Clear to auscultation bilaterally, no wheezes or crackles  ABD: Active bowel sounds, soft, non-tender/non-distended  EXT: No edema or cyanosis.    NEURO: CN II-XII grossly intact, no focal deficits on limited exam.    Medications       sodium chloride (PF)  3 mL Intracatheter Q8H     insulin aspart  1-7 Units Subcutaneous TID AC     insulin aspart  1-5 Units Subcutaneous At Bedtime     allopurinol  400 mg Oral Daily     heparin  5,000 Units Subcutaneous Q12H     aspirin  81 mg Oral Daily     atorvastatin  40 mg Oral Daily     calcium acetate  667 mg Oral TID w/meals     omeprazole  20 mg Oral At Bedtime     predniSONE  5 mg Oral Daily       Data     Recent Labs  Lab 02/08/18  0631 02/07/18  0718 02/06/18  0612 02/05/18  0629  02/03/18  0613  02/02/18  1736   WBC  --   --  8.3  --   --  8.4  --  7.4   HGB  --   --  10.1*  --   " --  9.5*  --  9.1*   MCV  --   --  100  --   --  102*  --  100   PLT  --   --  192 213  --  252  < > 229    137 138 138  < > 142  --  141   POTASSIUM 4.3 5.0 4.3 4.3  < > 4.4  --  4.4   CHLORIDE 102 102 103 102  < > 107  --  106   CO2 23 24 24 24  < > 25  --  22   BUN 23 30 32* 42*  < > 37*  --  30   CR 4.59* 5.08* 5.22* 6.39*  < > 6.41*  --  5.58*   ANIONGAP 12 11 11 12  < > 10  --  13   TRINI 9.2 9.3 8.6 8.8  < > 8.1*  --  8.2*   * 168* 162* 176*  < > 121*  --  240*   ALBUMIN  --   --   --   --   --   --   --  2.1*   PROTTOTAL  --   --   --   --   --   --   --  6.2*   BILITOTAL  --   --   --   --   --   --   --  0.4   ALKPHOS  --   --   --   --   --   --   --  86   ALT  --   --   --   --   --   --   --  16   AST  --   --   --   --   --   --   --  20   TROPI  --   --   --   --   --  0.247*  --  0.247*   < > = values in this interval not displayed.    No results found for this or any previous visit (from the past 24 hour(s)).    Imaging    RIGHT HEART CATH (2/8/18)    HEMODYNAMICS:  BSA 1.89  1.  bpm  2. /74/88 mmHg  3. RA 2/2/1   4. RV 27/1  5. PA 27/16/22   6. PCW 15/15/13   7. PA sat 44.7%   8. PCW sat 95.8%  9. Hgb 11.0 g/dL   10. Kassi CO 3.1   11. Kassi CI 1.6   12. TD CO deferred   13. TD CI deferred   14. PVR 3.0  15. TPR 7.2     Summary:     Pre-Angiography:  >> Low right sided filling pressures.  >> Normal left sided filling pressures.  >> Normal pulmonary artery pressures.  >> Mild to moderately low cardiac output as assessed by Kassi.     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

## 2018-02-09 NOTE — PROGRESS NOTES
CLINICAL NUTRITION SERVICES - BRIEF NOTE    Patient seen for LOS day 7 and tolerating diet with no GI complaints at this time. Appetite is good per nursing documentation and per RN report. Per flowsheets, pt eating 100% of 1-2 meals per day since admission. Pt in dialysis during attempted visit, but per RN, pt ate full breakfast this morning and planned to eat lunch at dialysis. RD will continue to follow and monitor pt per protocol.    Ashlie Marcelino RD, LD  6C Pgr:  [phone]

## 2018-02-09 NOTE — PLAN OF CARE
Problem: Cardiac: Heart Failure (Adult)  Goal: Signs and Symptoms of Listed Potential Problems Will be Absent, Minimized or Managed (Cardiac: Heart Failure)  Signs and symptoms of listed potential problems will be absent, minimized or managed by discharge/transition of care (reference Cardiac: Heart Failure (Adult) CPG).   D: Admitted with worsening SOB, found to have volume overload.    I: Monitored vitals and assessed pt status.   Changed  PRN:  A: Pt is A & 0x 4. Pleasant and cooperative. Denies any pain or discomfort. Up with independently/SBA.  Reports + MEADE, afebrile, cardiac monitor shows ST with  -120 bpm, on R/A, sating 94-97% denies any SOB. 1+ generalized edema. R IJ tunnel catheter for dialysis access.  BG checks q 4hr.  2 L fluid restriction. NPO for cath lab today. Transferred to cath lab at 1800   P: Continue to monitor pt status and report changes to treatment team of Cards 2

## 2018-02-09 NOTE — PROCEDURES
CARDIAC CATH REPORT    PROCEDURES PERFORMED:   Right Heart Catheterization  Coronary Angiography  Physiologic Assessment (FFR)    PHYSICIANS:  Attending Physician: Montrell Posada MD  Interventional Cardiology Fellow: Mayank Britt MD  Cardiology Fellow: Sukumar Mejía MD    INDICATION:  63 year old male with a history of non-obstructive CAD, cardiomyopathy (EF 10-15%), functionally bicuspid AV with ascending aortic aneurysm, ESRD on HD, HTN, T2DM who presents to cardiac cath lab for invasive hemodynamic assessment and coronary angiography to evaluate mechanism of drop in ejection fraction.       DESCRIPTION:  1. Consent obtained with discussion of risks.  All questions were answered.  2. Sterile prep and procedure.  3. Location with Sheaths:   Lf Radial Arterial  6 Fr  10 cm [short]  4. Access: Local anesthetic with lidocaine.  A micropuncture 21 guage needle with ultrasound guidance was used to establish vascular access using a modified Seldinger technique.  5. Diagnostic Catheters:   - JL-5 (for left), JL-5 Guide (for right)  - multiple catheters tried for RCA but ultimately JL-5 guide used for FFR assessment  - 7 Fr Canal [place] Jax  6. Guiding Catheters:  - 6 Fr JL-5 Guide  6. Estimated blood loss: < 50 ml    MEDICATIONS:  The procedure was performed under conscious sedation for 130 minutes from 18:43 to 20:53.  The patient was assessed immediately before the first sedation medication was administered.  Midazolam 2 mg and Fentanyl 100 mcg were administered.  Heart rate, BP, respiration, oxygen saturation and patient responses were monitored throughout the procedure with the assistance of the RN under my supervision.  >> IV UFH 15,500 U was administered to achieve anticoagulation.  >> Antiplatelet Therapy:  mg    Procedures:    HEMODYNAMICS:  BSA 1.89  1.  bpm  2. /74/88 mmHg  3. RA 2/2/1   4. RV 27/1  5. PA 27/16/22   6. PCW 15/15/13   7. PA sat 44.7%   8. PCW sat 95.8%  9. Hgb 11.0 g/dL   10. Kassi  CO 3.1   11. Kassi CI 1.6   12. TD CO deferred   13. TD CI deferred   14. PVR 3.0  15. TPR 7.2     Post Angiography:  - PCWP 43/34/33       CORONARY ANGIOGRAM:   1. Both coronary arteries arise from their respective cusps.  2. Dominance: Right  3. The LM is short and has moderate calcification proximally. It is without angiographic evidence of disease.   4. LAD: Type 3 [LAD supplies the entire apex]. The LAD gives rise to multiple rather long septal perforators, moderate caliber D1, small D2, and small D3. There is mild 25% diffuse apical LAD disease, but otherwise no significant disease elsewhere.  5. LCX is large in caliber proximally. There is a medium caliber OM1 which bifurcates and a small OM2 distally. OM1 has a 25% proximal stenosis. The LCx continues as small vessel into the AV groove.  6. 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 RCA has diffuse calcification with ~25% diffuse disease in proximal portion. There is a focal 50% stenosis in the mid vessel which was suboptimally imaged. FFR was 0.89 at max hyperemia (as noted below). There is diffuse 25% disease distally. The PL system and PDA are free of significant disease.  7. Ramus: medium caliber vessel which has a 50% proximal stenosis.      FUNCTIONAL ASSESSMENT:  Adequate anticoagulation was was obtained with IV UFH. A Radi Aeris wire was passed across the lesion.  Hyperemia was induced with IV Adenosine (140 mcg/kg/min).  The FFR at peak hyperemia was 0.89.      Sheath Removal:  The left radial arterial sheath was manually removed in the cardiac catheterization laboratory.    Contrast: Isovue, 345 ml     Fluoroscopy Time: 50.1 min    COMPLICATIONS:  1. None    SUMMARY:   # Pre-angiography:  >> Low right sided filling pressures.  >> Normal left sided filling pressures.  >> Normal pulmonary artery pressures.  >> Mild to moderately low cardiac output as assessed by Kassi.    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    The attending interventional cardiologist was present and supervised all critical aspects the procedure.    Findings discussed with patient bedside, night cardiology fellow, and Dr. Muse directly.    See CVIS report for final draft.    Sukumar Mejía MD   Cardiology Fellow      Staff Cardiologist: I supervised the cardiology fellow and reviewed the hemodynamic findings with the fellow at the completion of the procedure.  I agree with the documentation above.    Montrell Posada MD

## 2018-02-09 NOTE — PROGRESS NOTES
"HEMODIALYSIS TREATMENT NOTE    Date: 2/9/2018  Time: 5:40 PM    Data:  Ultrafiltration - Post Run Net Total Removed (mL): 900 mL  Ultrafiltration - Post Run Net Total Gain (mL): 0 mL  Vascular Access Status: Yes, secured and visible  Dialyzer Rinse: Clear  Total Blood Volume Processed: 71.2 L  Total Dialysis (Treatment) Time:  3 hours 26 minutes         Interventions/Assessment:  Originally ordered 4L fluid removal, only able to tolerate 900mL removal. Frequent drops in BP, with the lowest being 74/43. Pt symptomatic, diaphoretic and lightheaded throughout tx. Pt otherwise a/o x4 and stable. Tx terminated 4 minutes early s/t symptomatic hypotension with BP of 79/55, able to restore pt to a normotensive 101/73. Pt states he is \"feeling a little better\". Hand-off report given to primary floor RN.        Plan:    Continue HD per renal team.     "

## 2018-02-09 NOTE — PROGRESS NOTES
Nephrology Progress Note  02/09/2018         Assessment & Recommendations:   Murray Nicholson is a 63 year old year old male    ESKD: due to DM, on PD since Aug 2017, changed to IHD 1/2018, due to peritonitis polymicrobial and fungal, now schedule TTS, at [facility] under care of Dr Mcpherson, via RIJ tunnel, 3hr, EDW 80kg, now on daily dialysis to challenge his dry weight due to heart failure, volume overload and inc PA pressure.                         - Right heart cath shows inc pressure, will resume dialysis now and tomorrow might be off Sunday and resume his schedule, will discuss with his primary nephrologist if we can fit him four times /week for better volume management. .      BP and volume: BP is low normal, dialysis today,EDW still to be determind. Will UF 3 to 4 kg today if possible, order run for tomorrow if still here. consider starting low dose BB and low dose ACEI or ARBs,     Electrolyte: K 4.3, Na 137 no issue, Mg 1.9 keep mg close to 2.     Acid base:  bicarb 23 no issue.      Anemia: Hgb 10.1, will repeat tomorrow and if sll low will cont EPO with dialysis      BMD: Ca 9.2 no phos recorded, cont Hectorol 1 MCG 3 xwk  ith dialysis . Cont Phoslo for now.      HErEF: EF down to 10 to 15% from 20 to 25, left cath not done is right Cath shows   Scheduled for right and left heart cath. Repeated heart cath shows still elevated right side pressure, and mild ischemic non stent able disease.      Recommendations were communicated to primary team via note     Seen and discussed with Dr. Shilpi Saunders MD   [phone]  Interval History :   In the last 24 hours Murray Nicholson feels fine, yesterday went for heart cath, tolerated the procedure with no issue, slept good last night , denies any new complaint.   Review of Systems:   I reviewed the following systems:  GI: good appetite. no nausea or vomiting or diarrhea.   Neuro:  no confusion  Constitutional:  no fever or chills  CV: no dyspnea or  "edema.  no chest pain.    Physical Exam:   I/O last 3 completed shifts:  In: 898 [P.O.:200; I.V.:350; Other:348]  Out: -    BP 92/70  Pulse 119  Temp 97.8  F (36.6  C) (Oral)  Resp 16  Ht 1.753 m (5' 9\")  Wt 73.6 kg (162 lb 4.8 oz)  SpO2 100%  BMI 23.97 kg/m2     GENERAL APPEARANCE: NAD  EYES:  no scleral icterus, pupils equal  HENT: mouth without ulcers or lesions  PULM: lungs clear to auscultation,  bilaterally, equal air movement, no clubbing  CV: regular rhythm, normal rate, no rub     -JVD not elevated      -edema none    GI: soft, not tender, nondistended, bowel sounds are present  INTEGUMENT: no cyanosis, no rash  NEURO:  no asterixis   Access RIJ tunnel     Labs:   All labs reviewed by me  Electrolytes/Renal -   Recent Labs   Lab Test  02/08/18   0631  02/07/18   0718  02/06/18   0612   01/12/18   0742   11/15/17   0758  11/14/17   0645   NA  137  137  138   < >  137   < >  144  141   POTASSIUM  4.3  5.0  4.3   < >  3.5   < >  3.2*  3.7   CHLORIDE  102  102  103   < >  98   < >  103  103   CO2  23  24  24   < >  32   < >  30  27   BUN  23  30  32*   < >  38*   < >  69*  76*   CR  4.59*  5.08*  5.22*   < >  8.27*   < >  6.98*  6.85*   GLC  176*  168*  162*   < >  250*   < >  294*  312*   TRINI  9.2  9.3  8.6   < >  8.4*   < >  8.9  8.9   MAG  1.9  2.1  2.0   < >  1.7   < >  1.9  1.8   PHOS   --    --    --    --   3.3   --   5.0*  4.3    < > = values in this interval not displayed.       CBC -   Recent Labs   Lab Test  02/06/18   0612  02/05/18   0629  02/03/18   0613   02/02/18   1736   WBC  8.3   --   8.4   --   7.4   HGB  10.1*   --   9.5*   --   9.1*   PLT  192  213  252   < >  229    < > = values in this interval not displayed.       LFTs -   Recent Labs   Lab Test  02/02/18   1736  01/26/18   1446  01/07/18   1655  11/13/17   1709   ALKPHOS  86  95  76  104   BILITOTAL  0.4  0.4  0.4  0.8   ALT  16  16  24  26   AST  20  23  32  22   PROTTOTAL  6.2*   --   6.0*  6.0*   ALBUMIN  2.1*   --   1.8*  " 2.3*       Iron Panel -   Recent Labs   Lab Test  07/19/17   1306  07/05/17   1204  06/21/17   1058   IRON  46  26*  69   IRONSAT  18  12*  29   ALBERTINA  369  542*  557*         Imaging:  All imaging studies reviewed by me.     Current Medications:    sodium chloride (PF)  3 mL Intracatheter Q8H     insulin aspart  1-7 Units Subcutaneous TID AC     insulin aspart  1-5 Units Subcutaneous At Bedtime     allopurinol  400 mg Oral Daily     heparin  5,000 Units Subcutaneous Q12H     aspirin  81 mg Oral Daily     atorvastatin  40 mg Oral Daily     calcium acetate  667 mg Oral TID w/meals     omeprazole  20 mg Oral At Bedtime     predniSONE  5 mg Oral Daily       MD TIFFANY Powell, Russel Dobbins, saw this patient with Dr. Saunders and agree with the findings and plan of care as documented in the note. Will defer to cardiology, but likely approaching time when low dose beta-blocker +/- ACE or ARB can be tolerated, especially as will be transitioning alternate day HD.

## 2018-02-09 NOTE — PLAN OF CARE
"Problem: Patient Care Overview  Goal: Plan of Care/Patient Progress Review  Outcome: Improving  CATA: Pt with h/y of non obstructive  CAD,CM  (EF 10-15%), bicuspid AV ,A A aneyrism, ESRD on HD, HTN, DM2, admited due to MEADE, orthopnea.   Pt  went cath lab on 2/8 for evaluation of mechanism of drop in EF per note.   IJ sheath was removed by MD. L neck incision covered with transparent dressing CDI,no bleeding ,no hematoma observed. Pt reported  back pain ,Tylenol given per request with effect.   L radial band removed ,CMS wnl, no bleeding,no hematoma observed. VSS, ST with -112, BP 97/66  Pulse 119  Temp 97.8  F (36.6  C) (Oral)  Resp 16  Ht 1.753 m (5' 9\")  Wt 73.1 kg (161 lb 3.2 oz)  SpO2 95%  BMI 23.81 kg/m2. BS stable.   Plan: Continue to monitor,notify MD as needed.       "

## 2018-02-10 LAB
ANION GAP SERPL CALCULATED.3IONS-SCNC: 12 MMOL/L (ref 3–14)
BUN SERPL-MCNC: 25 MG/DL (ref 7–30)
CALCIUM SERPL-MCNC: 8.4 MG/DL (ref 8.5–10.1)
CHLORIDE SERPL-SCNC: 98 MMOL/L (ref 94–109)
CO2 SERPL-SCNC: 23 MMOL/L (ref 20–32)
CREAT SERPL-MCNC: 5.46 MG/DL (ref 0.66–1.25)
GFR SERPL CREATININE-BSD FRML MDRD: 11 ML/MIN/1.7M2
GLUCOSE BLDC GLUCOMTR-MCNC: 145 MG/DL (ref 70–99)
GLUCOSE BLDC GLUCOMTR-MCNC: 157 MG/DL (ref 70–99)
GLUCOSE BLDC GLUCOMTR-MCNC: 162 MG/DL (ref 70–99)
GLUCOSE BLDC GLUCOMTR-MCNC: 218 MG/DL (ref 70–99)
GLUCOSE BLDC GLUCOMTR-MCNC: 220 MG/DL (ref 70–99)
GLUCOSE SERPL-MCNC: 143 MG/DL (ref 70–99)
MAGNESIUM SERPL-MCNC: 1.6 MG/DL (ref 1.6–2.3)
POTASSIUM SERPL-SCNC: 4.3 MMOL/L (ref 3.4–5.3)
SODIUM SERPL-SCNC: 133 MMOL/L (ref 133–144)

## 2018-02-10 PROCEDURE — 25000132 ZZH RX MED GY IP 250 OP 250 PS 637: Performed by: INTERNAL MEDICINE

## 2018-02-10 PROCEDURE — 99232 SBSQ HOSP IP/OBS MODERATE 35: CPT | Mod: GC | Performed by: INTERNAL MEDICINE

## 2018-02-10 PROCEDURE — 25000128 H RX IP 250 OP 636: Performed by: INTERNAL MEDICINE

## 2018-02-10 PROCEDURE — 90937 HEMODIALYSIS REPEATED EVAL: CPT

## 2018-02-10 PROCEDURE — 83735 ASSAY OF MAGNESIUM: CPT | Performed by: INTERNAL MEDICINE

## 2018-02-10 PROCEDURE — 21400006 ZZH R&B CCU INTERMEDIATE UMMC

## 2018-02-10 PROCEDURE — 00000146 ZZHCL STATISTIC GLUCOSE BY METER IP

## 2018-02-10 PROCEDURE — 36415 COLL VENOUS BLD VENIPUNCTURE: CPT | Performed by: INTERNAL MEDICINE

## 2018-02-10 PROCEDURE — 25000132 ZZH RX MED GY IP 250 OP 250 PS 637: Performed by: STUDENT IN AN ORGANIZED HEALTH CARE EDUCATION/TRAINING PROGRAM

## 2018-02-10 PROCEDURE — 25000125 ZZHC RX 250: Performed by: INTERNAL MEDICINE

## 2018-02-10 PROCEDURE — 80048 BASIC METABOLIC PNL TOTAL CA: CPT | Performed by: INTERNAL MEDICINE

## 2018-02-10 RX ORDER — HYDRALAZINE HYDROCHLORIDE 25 MG/1
25 TABLET, FILM COATED ORAL 3 TIMES DAILY
Status: DISCONTINUED | OUTPATIENT
Start: 2018-02-10 | End: 2018-02-13

## 2018-02-10 RX ORDER — HEPARIN SODIUM 1000 [USP'U]/ML
500 INJECTION, SOLUTION INTRAVENOUS; SUBCUTANEOUS
Status: COMPLETED | OUTPATIENT
Start: 2018-02-10 | End: 2018-02-10

## 2018-02-10 RX ORDER — HEPARIN SODIUM 1000 [USP'U]/ML
500 INJECTION, SOLUTION INTRAVENOUS; SUBCUTANEOUS CONTINUOUS
Status: DISCONTINUED | OUTPATIENT
Start: 2018-02-10 | End: 2018-02-10

## 2018-02-10 RX ADMIN — ALLOPURINOL 400 MG: 300 TABLET ORAL at 08:22

## 2018-02-10 RX ADMIN — ALTEPLASE 2 MG: 2.2 INJECTION, POWDER, LYOPHILIZED, FOR SOLUTION INTRAVENOUS at 09:49

## 2018-02-10 RX ADMIN — HYDRALAZINE HYDROCHLORIDE 25 MG: 25 TABLET ORAL at 19:26

## 2018-02-10 RX ADMIN — HEPARIN SODIUM 500 UNITS/HR: 1000 INJECTION, SOLUTION INTRAVENOUS; SUBCUTANEOUS at 09:33

## 2018-02-10 RX ADMIN — ALTEPLASE 2 MG: 2.2 INJECTION, POWDER, LYOPHILIZED, FOR SOLUTION INTRAVENOUS at 13:14

## 2018-02-10 RX ADMIN — OMEPRAZOLE 20 MG: 20 CAPSULE, DELAYED RELEASE ORAL at 21:42

## 2018-02-10 RX ADMIN — HYDRALAZINE HYDROCHLORIDE 25 MG: 25 TABLET ORAL at 14:05

## 2018-02-10 RX ADMIN — HEPARIN SODIUM 500 UNITS: 1000 INJECTION, SOLUTION INTRAVENOUS; SUBCUTANEOUS at 09:32

## 2018-02-10 RX ADMIN — CALCIUM ACETATE 667 MG: 667 CAPSULE ORAL at 18:11

## 2018-02-10 RX ADMIN — PREDNISONE 5 MG: 5 TABLET ORAL at 08:22

## 2018-02-10 RX ADMIN — ATORVASTATIN CALCIUM 40 MG: 40 TABLET, FILM COATED ORAL at 08:22

## 2018-02-10 RX ADMIN — ASPIRIN 81 MG CHEWABLE TABLET 81 MG: 81 TABLET CHEWABLE at 08:22

## 2018-02-10 RX ADMIN — CALCIUM ACETATE 667 MG: 667 CAPSULE ORAL at 14:07

## 2018-02-10 RX ADMIN — SODIUM CHLORIDE 250 ML: 9 INJECTION, SOLUTION INTRAVENOUS at 09:32

## 2018-02-10 RX ADMIN — HEPARIN SODIUM 5000 UNITS: 5000 INJECTION, SOLUTION INTRAVENOUS; SUBCUTANEOUS at 19:26

## 2018-02-10 RX ADMIN — CALCIUM ACETATE 667 MG: 667 CAPSULE ORAL at 08:22

## 2018-02-10 RX ADMIN — SODIUM CHLORIDE 300 ML: 9 INJECTION, SOLUTION INTRAVENOUS at 09:32

## 2018-02-10 NOTE — PLAN OF CARE
Problem: Patient Care Overview  Goal: Plan of Care/Patient Progress Review  D: Pt tachycardic and intermittently hypotensive. Initial metoprolol dose held per physician order due to low BP. Back pain relieved with tramadol x1. No shortness of breath noted. HD 2/10.  I: Monitored/assessed pt. Assisted with cares.  A: Pt stable and comfortable.  P: Continue to monitor/assess pt, contact provider with concerns.

## 2018-02-10 NOTE — PROGRESS NOTES
Cardiology Progress Note    Assessment & Plan      Murray Nicholson is a 63 year old male with a history of non-obstructive CAD, HFrEF due to ICM EF 10-15%, functionally bicuspid AV with ascending aortic aneurysm, ESRD on HD, HTN, T2DM who presented with worsening MEADE & orthopnea in the setting of worsening EF, underwent daily dialysis this week with improvement in PCWP 45 --> 15.    *Changes Today:   - Dialysis without removal of fluid  - Start hydralazine 25mg TID     Acute on Chronic HFrEF due to ICM (EF 10-15%)   Patient's LVEF decreased from 40-45% on Nov 2016 to 20-25% in April 2017 and now 10-15%. Etiology of patient's heart failure is not entirely clear. He was previously diagnosed with HFpEF in the setting of diabetes and hypertension, also has bicuspid aortic valve although it was thought that patient's valvular disease was not severe enough to cause this reduction in systolic function. CAD is non-obstructive. Patient now admitted with worsening MEADE and orthopnea concerning for hypervolemia, RHC on 2/3 with elevated elevated right (RV 56/18) and left-sided (PCWP ~40) pressures. Cause of acute decompensation likely inability to remove adequate fluid with HD due to hypotension (only removing ~ 1.6-1.7 L each run). Repeat RHC on 2/8 with improved right and left-sided filling pressures (PCWP 15) and LHC with non-obstructive CAD. Of note, patient has been unable to tolerate HF medications recently due to hypotension during dialysis but was on medical therapy for > 3 mo prior to this.  - Nephrology consulted for dialysis, appreciate assistance    --> Dry weight challenged this admission - likely ~ 162 lbs, plan for dialysis today without removal of fluid         **Patient may be a candidate for combined heart-kidney transplant, will discuss further and may start work-up this admission    --> Strict Is & Os, daily weights, 2L fluid restriction  - BB: Holding due to hypotension  - ACEi/ARB: Holding due to  hypotension  - Afterload: Start hydralazine 25mg TID  - Aldosterone antagonist: ESRD  - CAD: aspirin 81 mg daily, atorvastatin 40 mg daily     Bicuspid Aortic Valve  Ascending Aortic Aneurysm  Most recent Echo 2/2/18 read as bicuspid aortic valve with moderate AI. Last cardiac MRI 3/4/14 with aortic root 4.5 x 4.5cm and proximal ascending aorta 4.1 x 4.3cm, not significantly different in size from prior.  - Will need routine monitoring to be arranged through cardiology clinic    ESRD on HD  Patient was previously on PD since summer 2017, developed peritonitis in Jan 2017 and switched to HD (T-Th-Sat). ESRD is likely secondary to HTN and T2DM. Has tunneled catheter for his access.   - Nephrology consult, appreciate recs    --> Dialysis today as above  **Will communicate with primary nephrologist Dr. Mcpherson re: possibility of heart-kidney transplant     T2DM  Last HgbA1c 8.6 on 2/2/18, increased from 6.4 on 6/27/17.   - Hold home glipizide  - Hypoglycemia protocol  - Increased to medium intensity SSI  **Diabetes education completed this admission. Will need to follow-up closely with primary care.     Gout  - Continue PTA prednisone 5 mg daily  - Continue PTA allopurinol 400 mg daily     Normocytic Anemia  Hgb stable around 9-10g/dL. Likely secondary to ESRD.  - Continue Epogen 4000 units Q3 weeks, started 2/6 per renal     FEN  -   Active Diet Order      2 Gram Sodium Diet, 2L fluid restriction  - PRN lytes replacement     Prophy/Misc: VTE: Heparin SQ  Lines: HD line, PIV  Code status: Full Code  Disposition: Likely discharge in 2-3 days pending dialysis plan and stable medications, will need close follow-up with [facility] and primary care    Patient was seen and discussed with Dr. Osmani Keen.    Mellisa Brunner  Internal Medicine, PGY3  [phone]    Interval History     No acute events overnight. Mr. Nicholson went to dialysis yesterday with goal of removing 4L but only 900mL were removed due to  "light-headedness, diaphoresis and hypotension. Today Mr. iNcholson says his breathing is better overall but he remains fatigued.     ----------------------------------------------------------------------------------------------------------------------------------------------------------------------    Physical Exam   Temp: 98  F (36.7  C) Temp src: Oral BP: 105/72   Heart Rate: 119 Resp: 18 SpO2: 99 % O2 Device: None (Room air) Oxygen Delivery: 2 LPM  Vitals:    02/08/18 0544 02/09/18 0646 02/10/18 0623   Weight: 73.1 kg (161 lb 3.2 oz) 73.6 kg (162 lb 4.8 oz) 73.7 kg (162 lb 6.4 oz)     Vital Signs with Ranges  Temp:  [97.1  F (36.2  C)-99.3  F (37.4  C)] 98.4  F (36.9  C)  Heart Rate:  [101-120] 105  Resp:  [16-18] 16  BP: ()/(46-80) 111/75  SpO2:  [95 %-100 %] 95 %  I/O last 3 completed shifts:  In: 783 [P.O.:783]  Out: 900 [Other:900]    Heart Rate: 119, Blood pressure 111/75, pulse 119, temperature 98.4  F (36.9  C), temperature source Oral, resp. rate 16, height 1.753 m (5' 9\"), weight 73.7 kg (162 lb 6.4 oz), SpO2 95 %.  162 lbs 6.4 oz  GEN: Alert & oriented, appears comfortable, NAD  CV: Tachycardic, regular rhythm, S3+, no JVD  LUNGS: Clear to auscultation bilaterally, no wheezes or crackles  ABD: Active bowel sounds, soft, non-tender/non-distended  EXT: No edema or cyanosis.  NEURO: CN II-XII grossly intact, no focal deficits on limited exam.    Medications     heparin (porcine)         gelatin absorbable  1 each Topical During Hemodialysis (from stock)     sodium chloride (PF)  3 mL Intracatheter During Hemodialysis (from stock)     sodium chloride (PF)  3 mL Intracatheter During Hemodialysis (from stock)     metoprolol tartrate  25 mg Oral BID     sodium chloride (PF)  3 mL Intracatheter Q8H     insulin aspart  1-7 Units Subcutaneous TID AC     insulin aspart  1-5 Units Subcutaneous At Bedtime     allopurinol  400 mg Oral Daily     heparin  5,000 Units Subcutaneous Q12H     aspirin  81 mg Oral Daily "     atorvastatin  40 mg Oral Daily     calcium acetate  667 mg Oral TID w/meals     omeprazole  20 mg Oral At Bedtime     predniSONE  5 mg Oral Daily       Data     Recent Labs  Lab 02/10/18  0617 02/08/18  0631 02/07/18 0718 02/06/18  0612 02/05/18  0629   WBC  --   --   --  8.3  --    HGB  --   --   --  10.1*  --    MCV  --   --   --  100  --    PLT  --   --   --  192 213    137 137 138 138   POTASSIUM 4.3 4.3 5.0 4.3 4.3   CHLORIDE 98 102 102 103 102   CO2 23 23 24 24 24   BUN 25 23 30 32* 42*   CR 5.46* 4.59* 5.08* 5.22* 6.39*   ANIONGAP 12 12 11 11 12   TRINI 8.4* 9.2 9.3 8.6 8.8   * 176* 168* 162* 176*     No results found for this or any previous visit (from the past 24 hour(s)).    Imaging    No new imaging studies today.

## 2018-02-10 NOTE — PLAN OF CARE
Problem: Cardiac: Heart Failure (Adult)  Goal: Signs and Symptoms of Listed Potential Problems Will be Absent, Minimized or Managed (Cardiac: Heart Failure)  Signs and symptoms of listed potential problems will be absent, minimized or managed by discharge/transition of care (reference Cardiac: Heart Failure (Adult) CPG).   Outcome: Improving    Cardiac: 's. Tmax 99.3 in am. BP stable. Held am dose of Metoprolol 2nd to dialysis. Started back on Hydralazine.     Respiratory: Sating 100 on RA. Lungs clear  GI/: No BM or urine  Diet/appetite: Tolerating renal diet. Didn't eat lunch. Blood glucose 157 and 218  Activity: SBA   Pain: Denies  Skin: Intact. Scars. Trace amount of edema  LDA's: Right RAC for Dialysis intact. Right PIV saline locked     Plan: Continue with POC. Notify primary team with changes. Had dialysis today- no fluid removed.

## 2018-02-10 NOTE — PROGRESS NOTES
"  Nephrology Progress Note  02/10/2018         Assessment & Recommendations:   Murray Nicholson is a 63 year old year old male    ESKD: due to DM, on PD since Aug 2017, changed to IHD 1/2018, due to peritonitis polymicrobial and fungal, now schedule TTS, at [facility] under care of Dr Mcpherson, via RIJ tunnel, 3hr, EDW 80kg.   ---- dialyzed daily and volume challenged  - RHC from the 8th was improved.   - Yesterday with hypotension and sxs with volume challenge  - I feel he is at his dry weight. Will plan HD today only to get back on schedule with no fluid removal.      BP and volume: LIkely at dry weight currently which would be ~ 73.5-74 kg. No UF planned for today. BP has been low if anything. Cardiology managing meds in the setting of heart failure.     Electrolyte: acceptable.      Acid base:  bicarb 23 no issue.      Anemia: please repeat hemoglobin (has not been checked since the 6th).      BMD: on  Hectorol 1 MCG 3 xwk with dialysis . Please check phos with AM labs as has not been checked this admission. Currently on phoslo 1 tab TID with meals.        Interval History :   Had many difficulties on dialysis yesterday with hypotension as well as sxs associated with hypotension. I feel he has reached his dry weight. He is comfortable with his breathing at this time (although still reports chronically feeling SOB - he questions whether this is realted to his high heart rate). He denies edema.     Review of Systems:   I reviewed the following systems:  GI: good appetite. no nausea or vomiting or diarrhea.   Neuro:  no confusion  Constitutional:  no fever or chills  CV: no dyspnea or edema.     Physical Exam:   I/O last 3 completed shifts:  In: 783 [P.O.:783]  Out: 900 [Other:900]   /67  Pulse 119  Temp 98.4  F (36.9  C) (Oral)  Resp 16  Ht 1.753 m (5' 9\")  Wt 73.7 kg (162 lb 6.4 oz)  SpO2 100%  BMI 23.98 kg/m2     GENERAL APPEARANCE: NAD  PULM: lungs clear to auscultation,  bilaterally, equal air " movement, no clubbing  CV: regular rhythm, normal rate, no rub     -edema none    INTEGUMENT: no cyanosis, no rash  Access RIJ tunnel     Labs:   All labs reviewed by me  Electrolytes/Renal -   Recent Labs   Lab Test  02/10/18   0617  02/08/18   0631  02/07/18   0718   01/12/18   0742   11/15/17   0758  11/14/17   0645   NA  133  137  137   < >  137   < >  144  141   POTASSIUM  4.3  4.3  5.0   < >  3.5   < >  3.2*  3.7   CHLORIDE  98  102  102   < >  98   < >  103  103   CO2  23  23  24   < >  32   < >  30  27   BUN  25  23  30   < >  38*   < >  69*  76*   CR  5.46*  4.59*  5.08*   < >  8.27*   < >  6.98*  6.85*   GLC  143*  176*  168*   < >  250*   < >  294*  312*   TRINI  8.4*  9.2  9.3   < >  8.4*   < >  8.9  8.9   MAG  1.6  1.9  2.1   < >  1.7   < >  1.9  1.8   PHOS   --    --    --    --   3.3   --   5.0*  4.3    < > = values in this interval not displayed.       CBC -   Recent Labs   Lab Test  02/06/18   0612  02/05/18   0629  02/03/18   0613   02/02/18   1736   WBC  8.3   --   8.4   --   7.4   HGB  10.1*   --   9.5*   --   9.1*   PLT  192  213  252   < >  229    < > = values in this interval not displayed.       LFTs -   Recent Labs   Lab Test  02/02/18   1736  01/26/18   1446  01/07/18   1655  11/13/17   1709   ALKPHOS  86  95  76  104   BILITOTAL  0.4  0.4  0.4  0.8   ALT  16  16  24  26   AST  20  23  32  22   PROTTOTAL  6.2*   --   6.0*  6.0*   ALBUMIN  2.1*   --   1.8*  2.3*       Iron Panel -   Recent Labs   Lab Test  07/19/17   1306  07/05/17   1204  06/21/17   1058   IRON  46  26*  69   IRONSAT  18  12*  29   ALBERTINA  369  542*  557*         Imaging:  All imaging studies reviewed by me.     Current Medications:    gelatin absorbable  1 each Topical During Hemodialysis (from stock)     sodium chloride (PF)  3 mL Intracatheter During Hemodialysis (from stock)     sodium chloride (PF)  3 mL Intracatheter During Hemodialysis (from stock)     metoprolol tartrate  25 mg Oral BID     sodium chloride (PF)  3 mL  Intracatheter Q8H     insulin aspart  1-7 Units Subcutaneous TID AC     insulin aspart  1-5 Units Subcutaneous At Bedtime     allopurinol  400 mg Oral Daily     heparin  5,000 Units Subcutaneous Q12H     aspirin  81 mg Oral Daily     atorvastatin  40 mg Oral Daily     calcium acetate  667 mg Oral TID w/meals     omeprazole  20 mg Oral At Bedtime     predniSONE  5 mg Oral Daily       heparin (porcine) 500 Units/hr (02/10/18 8185)     Aleyda Darling MD

## 2018-02-10 NOTE — PLAN OF CARE
Problem: Patient Care Overview  Goal: Plan of Care/Patient Progress Review  D- MEADE and orthopnea  I/A- angio 2/8 showing 50% stenosis to RCA, no intervention. L IJ and L radial site WNL. ST, Dialysis today.  BP low 75/50 post dialysis while sitting at edge of bed, BP up to 100/60 recheck with laying down. Dialysis today, 900 mls off per report and run ended early d/t hypotension. SSI given as ordered.   P- Continue to monitor and notify team of changes.

## 2018-02-10 NOTE — PROGRESS NOTES
HEMODIALYSIS TREATMENT NOTE    Date: 2/10/2018  Time: 1:26 PM    Data:  Pre Wt: 73.7 kg (162 lb 7.7 oz)   Desired Wt: 73.7 kg   Post Wt:    Weight gain:  0 kg   Weight change:  0 kg  Ultrafiltration - Post Run Net Total Removed (mL): 0 mL  Ultrafiltration - Post Run Net Total Gain (mL): 0 mL  Vascular Access Status: Yes, secured and visible  Dialyzer Rinse: Light  Total Blood Volume Processed: 47.8  Total Dialysis (Treatment) Time:  2.5    Lab:   NO     Interventions: 2.5hr of HD run via RCVC. After 10min of tx started, AP increased, Lines were reverse, tegos removed and lines flushed no improvement noted. TPA 2ml was instill in both lumens for 30 min. After TPA removed dialysis was started again with no issues. Blood flow decreased from 350ml/min to 300ml/min d/t AP alarms. No fluid removed per MD Darling order. CVC dressing was changed. Lines were TPA lock 2ml in each lumen. See MAR for medication details. Hand off report was given to Maryanne RN on 6C.     Assessment: pt A&O. VS stable. No c/o pain reported. Slept most of the tx.     Plan: Continue with plan of care. Next dialysis per renal team.

## 2018-02-10 NOTE — PROGRESS NOTES
Dialysis Discharge Summary Brief    [facility]  Division of Nephrology  Nephrology Discharge Dialysis Orders  Ph: [phone]  Fax: [phone]    Murray Nicholson  MRN: [id number]  YOB: 1955    [facility]: [facility] Nephrologist: Vida    Date of Admission: 2/2/2018  Date of Discharge: 2/10/2018  Discharge Diagnosis: A/C CHF with volume overload as his Right heart cath shows  PA pressure was very elevated, his left heart cath mild disease , his EF down to 10%,   ESRD on IHD MWF, daily dialysis done for volume management. Pt is with low BP consider changing to 4 times/week for easy UF goal.     ICD-10-CM    1. MEADE (dyspnea on exertion) R06.09 Comprehensive metabolic panel     Platelet count     UA with Microscopic reflex to Culture     Basic metabolic panel     CBC with platelets     Glucose by meter     Glucose by meter     Hemoglobin A1c     Hemoglobin A1c     Glucose by meter     Glucose by meter     Troponin I     Troponin I     Glucose by meter     Glucose by meter     Basic metabolic panel     Magnesium     Glucose by meter     Glucose by meter     Glucose by meter     Glucose by meter     Glucose by meter     Glucose by meter     Magnesium     Glucose by meter     Glucose by meter     Basic metabolic panel     Magnesium     Platelet count     Glucose by meter     Glucose by meter     Glucose by meter     Glucose by meter     UA with Microscopic reflex to Culture     Glucose by meter     Glucose by meter     Glucose by meter     Glucose by meter     Glucose by meter     Glucose by meter     CBC with platelets     Basic metabolic panel     Magnesium     Glucose by meter     Glucose by meter     Glucose by meter     Glucose by meter     Glucose by meter     Glucose by meter     Glucose by meter     Glucose by meter     Basic metabolic panel     Magnesium     Glucose by meter     Glucose by meter     Glucose by meter     Glucose by meter      Glucose by meter     Glucose by meter     Glucose by meter     Glucose by meter     Basic metabolic panel     Magnesium     Glucose by meter     Glucose by meter     Glucose by meter     Glucose by meter     Activated clotting time POCT     Activated clotting time POCT     Activated clotting time POCT     Activated clotting time POCT     Glucose by meter     Glucose by meter     Glucose by meter     Glucose by meter     Glucose by meter     Glucose by meter     Glucose by meter     Glucose by meter     Glucose by meter     Glucose by meter     CANCELED: Hemoglobin A1c     CANCELED: Troponin I     CANCELED: Platelet count    moderate to severe with worsening cardiomyopathy (EF now 10-15%)   2. Acute on chronic systolic heart failure (H) I50.23    3. 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 Chronic systolic heart failure (H) I50.22 DISCONTINUED: lidocaine (LMX4) kit     DISCONTINUED: lidocaine 1 % 1 mL     DISCONTINUED: sodium chloride (PF) 0.9% PF flush 3 mL     DISCONTINUED: sodium chloride (PF) 0.9% PF flush 3 mL     DISCONTINUED: 0.9% sodium chloride infusion     DISCONTINUED: aspirin EC EC tablet 325 mg     CANCELED: Notify Cardiologist     CANCELED: NPO for Medical/Clinical Reasons Except for: Meds     CANCELED: Activity: Up ad edith     CANCELED: Glucose monitor nursing POCT     CANCELED: Obtain affirmation of informed consent     CANCELED: Patient Education     CANCELED: Peripheral IV: Standard     CANCELED: Vital Signs and Pain Assessment     CANCELED: Notify Provider     CANCELED: EKG 12-lead, tracing only     CANCELED: Notify Cardiologist     CANCELED: Activity: Up ad edith     CANCELED: Glucose monitor nursing POCT     CANCELED: Obtain affirmation of informed consent     CANCELED: Patient Education     CANCELED: Peripheral IV: Standard     CANCELED: Vital Signs and Pain Assessment     CANCELED: EKG 12-lead, tracing only   5. Ascending aortic aneurysm (H) I71.2 DISCONTINUED: lidocaine (LMX4) kit      DISCONTINUED: lidocaine 1 % 1 mL     DISCONTINUED: sodium chloride (PF) 0.9% PF flush 3 mL     DISCONTINUED: sodium chloride (PF) 0.9% PF flush 3 mL     DISCONTINUED: 0.9% sodium chloride infusion     DISCONTINUED: aspirin EC EC tablet 325 mg     CANCELED: Notify Cardiologist     CANCELED: NPO for Medical/Clinical Reasons Except for: Meds     CANCELED: Activity: Up ad edith     CANCELED: Glucose monitor nursing POCT     CANCELED: Obtain affirmation of informed consent     CANCELED: Patient Education     CANCELED: Peripheral IV: Standard     CANCELED: Vital Signs and Pain Assessment     CANCELED: Notify Provider     CANCELED: EKG 12-lead, tracing only     CANCELED: Notify Cardiologist     CANCELED: Activity: Up ad edith     CANCELED: Glucose monitor nursing POCT     CANCELED: Obtain affirmation of informed consent     CANCELED: Patient Education     CANCELED: Peripheral IV: Standard     CANCELED: Vital Signs and Pain Assessment     CANCELED: EKG 12-lead, tracing only   6. Coronary artery disease involving native coronary artery of native heart without angina pectoris I25.10 DISCONTINUED: lidocaine (LMX4) kit     DISCONTINUED: lidocaine 1 % 1 mL     DISCONTINUED: sodium chloride (PF) 0.9% PF flush 3 mL     DISCONTINUED: sodium chloride (PF) 0.9% PF flush 3 mL     DISCONTINUED: 0.9% sodium chloride infusion     DISCONTINUED: aspirin EC EC tablet 325 mg     CANCELED: Notify Cardiologist     CANCELED: NPO for Medical/Clinical Reasons Except for: Meds     CANCELED: Activity: Up ad edith     CANCELED: Glucose monitor nursing POCT     CANCELED: Obtain affirmation of informed consent     CANCELED: Patient Education     CANCELED: Peripheral IV: Standard     CANCELED: Vital Signs and Pain Assessment     CANCELED: Notify Provider     CANCELED: EKG 12-lead, tracing only     CANCELED: Notify Cardiologist     CANCELED: Activity: Up ad edith     CANCELED: Glucose monitor nursing POCT     CANCELED: Obtain affirmation of informed consent      CANCELED: Patient Education     CANCELED: Peripheral IV: Standard     CANCELED: Vital Signs and Pain Assessment     CANCELED: EKG 12-lead, tracing only   7. Hyperlipidemia LDL goal <100 E78.5 DISCONTINUED: lidocaine (LMX4) kit     DISCONTINUED: lidocaine 1 % 1 mL     DISCONTINUED: sodium chloride (PF) 0.9% PF flush 3 mL     DISCONTINUED: sodium chloride (PF) 0.9% PF flush 3 mL     DISCONTINUED: 0.9% sodium chloride infusion     DISCONTINUED: aspirin EC EC tablet 325 mg     CANCELED: Notify Cardiologist     CANCELED: NPO for Medical/Clinical Reasons Except for: Meds     CANCELED: Activity: Up ad edith     CANCELED: Glucose monitor nursing POCT     CANCELED: Obtain affirmation of informed consent     CANCELED: Patient Education     CANCELED: Peripheral IV: Standard     CANCELED: Vital Signs and Pain Assessment     CANCELED: Notify Provider     CANCELED: EKG 12-lead, tracing only     CANCELED: Notify Cardiologist     CANCELED: Activity: Up ad edith     CANCELED: Glucose monitor nursing POCT     CANCELED: Obtain affirmation of informed consent     CANCELED: Patient Education     CANCELED: Peripheral IV: Standard     CANCELED: Vital Signs and Pain Assessment     CANCELED: EKG 12-lead, tracing only   8. Bicuspid aortic valve Q23.1 DISCONTINUED: lidocaine (LMX4) kit     DISCONTINUED: lidocaine 1 % 1 mL     DISCONTINUED: sodium chloride (PF) 0.9% PF flush 3 mL     DISCONTINUED: sodium chloride (PF) 0.9% PF flush 3 mL     DISCONTINUED: 0.9% sodium chloride infusion     DISCONTINUED: aspirin EC EC tablet 325 mg     CANCELED: Notify Cardiologist     CANCELED: NPO for Medical/Clinical Reasons Except for: Meds     CANCELED: Activity: Up ad edith     CANCELED: Glucose monitor nursing POCT     CANCELED: Obtain affirmation of informed consent     CANCELED: Patient Education     CANCELED: Peripheral IV: Standard     CANCELED: Vital Signs and Pain Assessment     CANCELED: Notify Provider     CANCELED: EKG 12-lead, tracing only      CANCELED: Notify Cardiologist     CANCELED: Activity: Up ad edith     CANCELED: Glucose monitor nursing POCT     CANCELED: Obtain affirmation of informed consent     CANCELED: Patient Education     CANCELED: Peripheral IV: Standard     CANCELED: Vital Signs and Pain Assessment     CANCELED: EKG 12-lead, tracing only       [no] New initiation, new dialysis orders will be faxed.    [no] Resume all previous dialysis orders with exception as noted below    New Orders (if not applicable put NA):  Estimated Dry Weight TBD   Dialysis Duration 3.5   Dialysis Access RIJ   Antibiotics (dose per dialysis, end date) no           Labs to be drawn at dialysis As perprotocol   Other major changes to dialysis prescription (e.g. Dialysate bath, heparin, blood flow rate, etc)   Low dose heparin   Medication changes (also fax the unit a copy of the discharge summary)         See medication list     Name of physician completing this form: Bouchra Saunders MD, MD        02/10/18  I agree with this discharge summary by Dr Saunders.

## 2018-02-11 LAB
ERYTHROCYTE [DISTWIDTH] IN BLOOD BY AUTOMATED COUNT: 19.3 % (ref 10–15)
GLUCOSE BLDC GLUCOMTR-MCNC: 128 MG/DL (ref 70–99)
GLUCOSE BLDC GLUCOMTR-MCNC: 148 MG/DL (ref 70–99)
GLUCOSE BLDC GLUCOMTR-MCNC: 216 MG/DL (ref 70–99)
GLUCOSE BLDC GLUCOMTR-MCNC: 236 MG/DL (ref 70–99)
GLUCOSE BLDC GLUCOMTR-MCNC: 247 MG/DL (ref 70–99)
GLUCOSE BLDC GLUCOMTR-MCNC: 250 MG/DL (ref 70–99)
HCT VFR BLD AUTO: 31.9 % (ref 40–53)
HGB BLD-MCNC: 10 G/DL (ref 13.3–17.7)
MAGNESIUM SERPL-MCNC: 1.8 MG/DL (ref 1.6–2.3)
MCH RBC QN AUTO: 30.9 PG (ref 26.5–33)
MCHC RBC AUTO-ENTMCNC: 31.3 G/DL (ref 31.5–36.5)
MCV RBC AUTO: 99 FL (ref 78–100)
PLATELET # BLD AUTO: 203 10E9/L (ref 150–450)
POTASSIUM SERPL-SCNC: 4.2 MMOL/L (ref 3.4–5.3)
RBC # BLD AUTO: 3.24 10E12/L (ref 4.4–5.9)
WBC # BLD AUTO: 6.4 10E9/L (ref 4–11)

## 2018-02-11 PROCEDURE — 36415 COLL VENOUS BLD VENIPUNCTURE: CPT | Performed by: INTERNAL MEDICINE

## 2018-02-11 PROCEDURE — 25000132 ZZH RX MED GY IP 250 OP 250 PS 637: Performed by: STUDENT IN AN ORGANIZED HEALTH CARE EDUCATION/TRAINING PROGRAM

## 2018-02-11 PROCEDURE — 25000132 ZZH RX MED GY IP 250 OP 250 PS 637: Performed by: INTERNAL MEDICINE

## 2018-02-11 PROCEDURE — 00000146 ZZHCL STATISTIC GLUCOSE BY METER IP

## 2018-02-11 PROCEDURE — 25000125 ZZHC RX 250: Performed by: INTERNAL MEDICINE

## 2018-02-11 PROCEDURE — 99232 SBSQ HOSP IP/OBS MODERATE 35: CPT | Mod: GC | Performed by: INTERNAL MEDICINE

## 2018-02-11 PROCEDURE — 83735 ASSAY OF MAGNESIUM: CPT | Performed by: INTERNAL MEDICINE

## 2018-02-11 PROCEDURE — 21400006 ZZH R&B CCU INTERMEDIATE UMMC

## 2018-02-11 PROCEDURE — 25000128 H RX IP 250 OP 636: Performed by: INTERNAL MEDICINE

## 2018-02-11 PROCEDURE — 85027 COMPLETE CBC AUTOMATED: CPT | Performed by: INTERNAL MEDICINE

## 2018-02-11 PROCEDURE — 84132 ASSAY OF SERUM POTASSIUM: CPT | Performed by: INTERNAL MEDICINE

## 2018-02-11 RX ORDER — ISOSORBIDE DINITRATE 20 MG/1
20 TABLET ORAL 3 TIMES DAILY
Status: DISCONTINUED | OUTPATIENT
Start: 2018-02-11 | End: 2018-02-11

## 2018-02-11 RX ADMIN — HYDRALAZINE HYDROCHLORIDE 25 MG: 25 TABLET ORAL at 08:27

## 2018-02-11 RX ADMIN — HEPARIN SODIUM 5000 UNITS: 5000 INJECTION, SOLUTION INTRAVENOUS; SUBCUTANEOUS at 08:27

## 2018-02-11 RX ADMIN — CALCIUM ACETATE 667 MG: 667 CAPSULE ORAL at 13:18

## 2018-02-11 RX ADMIN — HYDRALAZINE HYDROCHLORIDE 25 MG: 25 TABLET ORAL at 22:33

## 2018-02-11 RX ADMIN — PREDNISONE 5 MG: 5 TABLET ORAL at 08:27

## 2018-02-11 RX ADMIN — HYDRALAZINE HYDROCHLORIDE 25 MG: 25 TABLET ORAL at 15:48

## 2018-02-11 RX ADMIN — CALCIUM ACETATE 667 MG: 667 CAPSULE ORAL at 08:27

## 2018-02-11 RX ADMIN — ASPIRIN 81 MG CHEWABLE TABLET 81 MG: 81 TABLET CHEWABLE at 08:27

## 2018-02-11 RX ADMIN — ISOSORBIDE DINITRATE 20 MG: 20 TABLET ORAL at 08:27

## 2018-02-11 RX ADMIN — HEPARIN SODIUM 5000 UNITS: 5000 INJECTION, SOLUTION INTRAVENOUS; SUBCUTANEOUS at 19:55

## 2018-02-11 RX ADMIN — OMEPRAZOLE 20 MG: 20 CAPSULE, DELAYED RELEASE ORAL at 22:34

## 2018-02-11 RX ADMIN — ATORVASTATIN CALCIUM 40 MG: 40 TABLET, FILM COATED ORAL at 08:28

## 2018-02-11 RX ADMIN — ALLOPURINOL 400 MG: 300 TABLET ORAL at 08:27

## 2018-02-11 RX ADMIN — CALCIUM ACETATE 667 MG: 667 CAPSULE ORAL at 18:07

## 2018-02-11 NOTE — PLAN OF CARE
Problem: Cardiac: Heart Failure (Adult)  Goal: Signs and Symptoms of Listed Potential Problems Will be Absent, Minimized or Managed (Cardiac: Heart Failure)  Signs and symptoms of listed potential problems will be absent, minimized or managed by discharge/transition of care (reference Cardiac: Heart Failure (Adult) CPG).   Outcome: No Change  D/I/A: Pt up ad edith and using call light for assistance.  A+O x4 and able to make needs known.  Denies pain or sob.  VSSA.  Pleasant and cooperative with cares and treatments.  L radial puncture site from angio OBED and WDL; CMS intact.    P: Continue to monitor status.

## 2018-02-11 NOTE — PROGRESS NOTES
Cardiology Progress Note    Assessment & Plan    Date of service: 2/11/18    Murray Nicholson is a 63 year old male with a history of non-obstructive CAD, HFrEF due to ICM EF 10-15%, functionally bicuspid AV with ascending aortic aneurysm, ESRD on HD, HTN, T2DM who presented with worsening MEADE & orthopnea in the setting of worsening EF, underwent daily dialysis with improvement in PCWP 45 --> 15, now with ongoing hypotension in the setting of low doses of cardiac medications.    *Changes Today:   - Stop isosorbide dinitrate  - No dialysis today (next due Tues)     Acute on Chronic HFrEF (EF 10-15%)   Patient's LVEF decreased from 40-45% in Nov 2016 to 20-25% in April 2017 and now 10-15%. Etiology of patient's heart failure is not entirely clear. He was previously diagnosed with HFpEF in the setting of diabetes and hypertension, also has bicuspid aortic valve although it was thought that patient's valvular disease was not severe enough to cause this reduction in systolic function. CAD is non-obstructive. Patient now admitted with worsening MEADE and orthopnea concerning for hypervolemia, RHC on 2/3 with elevated elevated right (RV 56/18) and left-sided (PCWP ~40) pressures. Cause of acute decompensation was likely inability to remove adequate fluid with HD due to hypotension (only removing ~ 1.6-1.7 L each run). Repeat RHC on 2/8 with improved right and left-sided filling pressures (PCWP 15) and LHC with non-obstructive CAD. Of note, patient has been unable to tolerate HF medications recently due to hypotension during dialysis but was on medical therapy for > 3 mo prior to this.  - Nephrology consulted for dialysis, appreciate assistance    --> Dry weight challenged this admission - likely ~ 162 lbs, plan for next dialysis run on Tues 2/13 (resume T-Th-Sat schedule)         **Patient may be a candidate for combined heart-kidney transplant, will discuss further and may start work-up this admission           --> Patient said  he was previously considered for kidney transplant but did not complete work-up including colonoscopy and dental exam          **Ongoing challenges with hypotension with low doses of cardiac medications - isordil started on 2/11 but discontinued due to low BPs  - Strict Is & Os, daily weights, 2L fluid restriction  - BB: Holding due to hypotension  - ACEi/ARB: Holding due to hypotension  - Afterload: Continue hydralazine 25mg TID (hold for SBP < 100 and hold morning dose on day of dialysis)  - Aldosterone antagonist: ESRD  - CAD: aspirin 81 mg daily, atorvastatin 40 mg daily     Bicuspid Aortic Valve  Ascending Aortic Aneurysm  Most recent Echo 2/2/18 read as bicuspid aortic valve with moderate AI. Last cardiac MRI 3/4/14 with aortic root 4.5 x 4.5cm and proximal ascending aorta 4.1 x 4.3cm, not significantly different in size from prior.  - Will need routine monitoring to be arranged through cardiology clinic    ESRD on HD  Patient was previously on PD since summer 2017, developed peritonitis in Jan 2017 and switched to HD (T-Th-Sat). ESRD is likely secondary to HTN and T2DM. Has tunneled catheter for his access.   - Nephrology consult, appreciate recs    --> Dialysis today as above  **Will communicate with primary nephrologist Dr. Mcpherson re: possibility of heart-kidney transplant on Monday 2/12     T2DM  Last HgbA1c 8.6 on 2/2/18, increased from 6.4 on 6/27/17.   - Hold home glipizide, will plan to restart on discharge  - Medium intensity SSI, hypoglycemia protocol  **Diabetes education completed this admission. Will need to follow-up closely with primary care.     Gout  - Continue PTA prednisone 5 mg daily  - Continue PTA allopurinol 400 mg daily     Normocytic Anemia  Hgb stable around 9-10g/dL. Likely secondary to ESRD.  - Continue Epogen 4000 units Q3 weeks, started 2/6 per renal     FEN  -   Active Diet Order      2 Gram Sodium Diet, 2L fluid restriction  - PRN lytes replacement     Prophy/Misc: VTE:  "Heparin SQ  Lines: HD line, PIV  Code status: Full Code  Disposition: Likely discharge in 2-3 days pending dialysis plan and stable medications, will need close follow-up with [facility] and primary care    Patient was seen and discussed with Dr. Osmani Keen.    Mellisa Peralta [facility]  Internal Medicine, PGY3  [phone]    Interval History     No acute events overnight. Mr. Nicholson was dialyzed yesterday for 2.5 hrs without removal of fluid. His weight remains stable around 162 pounds. This morning around 9am Mr. Nicholson felt dizzy and had an episode of fast breathing when he was in the bathroom. BP was 80/50s with MAPs 62-65 with HR in the 120s. He had taken both hydralazine 25mg and isordil 20mg ~ 30 minutes before this episode.     ----------------------------------------------------------------------------------------------------------------------------------------------------------------------    Physical Exam   Temp: 98.2  F (36.8  C) Temp src: Oral BP: 111/77 Pulse: 100 Heart Rate: 107 Resp: 16 SpO2: 100 % O2 Device: None (Room air)    Vitals:    02/09/18 0646 02/10/18 0623 02/11/18 0637   Weight: 73.6 kg (162 lb 4.8 oz) 73.7 kg (162 lb 6.4 oz) 73.5 kg (162 lb 1.6 oz)     Vital Signs with Ranges  Temp:  [98  F (36.7  C)-99.3  F (37.4  C)] 98.2  F (36.8  C)  Pulse:  [100] 100  Heart Rate:  [105-118] 107  Resp:  [16] 16  BP: ()/(46-77) 111/77  SpO2:  [95 %-100 %] 100 %  I/O last 3 completed shifts:  In: 1203 [P.O.:1200; I.V.:3]  Out: 0     Heart Rate: 107, Blood pressure 111/77, pulse 100, temperature 98.2  F (36.8  C), temperature source Oral, resp. rate 16, height 1.753 m (5' 9\"), weight 73.5 kg (162 lb 1.6 oz), SpO2 100 %.  162 lbs 1.6 oz  GEN: Alert & oriented, appears comfortable, NAD  CV: Tachycardic, regular rhythm, S3+, no JVD  LUNGS: Clear to auscultation bilaterally, no wheezes or crackles  ABD: Active bowel sounds, soft, non-tender/non-distended  EXT: No edema or cyanosis.  NEURO: CN " II-XII grossly intact, no focal deficits on limited exam    Medications       hydrALAZINE  25 mg Oral TID     sodium chloride (PF)  3 mL Intracatheter Q8H     insulin aspart  1-7 Units Subcutaneous TID AC     insulin aspart  1-5 Units Subcutaneous At Bedtime     allopurinol  400 mg Oral Daily     heparin  5,000 Units Subcutaneous Q12H     aspirin  81 mg Oral Daily     atorvastatin  40 mg Oral Daily     calcium acetate  667 mg Oral TID w/meals     omeprazole  20 mg Oral At Bedtime     predniSONE  5 mg Oral Daily       Data     Recent Labs  Lab 02/11/18 0658 02/10/18  0617 02/08/18  0631 02/07/18 0718 02/06/18  0612 02/05/18  0629   WBC 6.4  --   --   --  8.3  --    HGB 10.0*  --   --   --  10.1*  --    MCV 99  --   --   --  100  --      --   --   --  192 213   NA  --  133 137 137 138 138   POTASSIUM 4.2 4.3 4.3 5.0 4.3 4.3   CHLORIDE  --  98 102 102 103 102   CO2  --  23 23 24 24 24   BUN  --  25 23 30 32* 42*   CR  --  5.46* 4.59* 5.08* 5.22* 6.39*   ANIONGAP  --  12 12 11 11 12   TRINI  --  8.4* 9.2 9.3 8.6 8.8   GLC  --  143* 176* 168* 162* 176*     No results found for this or any previous visit (from the past 24 hour(s)).    Imaging    No new imaging studies today.

## 2018-02-11 NOTE — PROVIDER NOTIFICATION
Patient's BP 97/66 MAP 74 before afternoon hydralazine dose. Patient was recently in the restroom and reported mild dizziness, less than this morning. Per C2, do not give dose at this time and to recheck BP in an hour. If SBP >105, okay to give 1400 dose of hydralazine. If <105, call and discuss with C2 before giving dose.

## 2018-02-11 NOTE — PROGRESS NOTES
Nephrology Progress Note  02/11/2018         Assessment & Recommendations:     ESKD: due to DM, on PD since Aug 2017, changed to IHD 1/2018, due to peritonitis polymicrobial and fungal, now schedule TTS, at [facility] under care of Dr Mcpherson, via RIJ tunnel, 3hr, EDW 80kg PTA.    ---- dialyzed daily and volume challenged  - RHC from the 8th was improved and much difficulty with hypotension on recent runs when attempting to challenge volume.   - I feel he is at his dry weight. He dialyzed yesterday just to get back to a TTS schedule. NEW EDW ~73.5 or 74 kg (likely closer to 74 kg as outpt given clothing as outpt).      BP and volume: at dry weight currently which would be ~ 73.5-74 kg.  Cardiology managing meds in the setting of heart failure. Hypotension will limit his ability for volume removal as an outpt - stressed the importance of strict fluid restriction.     Electrolyte: acceptable.      Acid base:  bicarb at goal yesterday      Anemia: hemoglobin currently 10. He has received a few doses of EPO while inpt - neph team to contact outpt dialysis unit Monday to confirm current EPO dose as outpt as well as most recent iron studies.      BMD: on  Hectorol 1 MCG 3 xwk with dialysis . Please check phos with AM labs as has not been checked this admission. Currently on phoslo 1 tab TID with meals.        Interval History :   Feeling better today - tolerated dialysis well yesterday in the setting of no fluid removal. He reports feeling in general comfortable with his breathing. He is eating breakfast this AM without difficulty. No specific complaints. Catheter did not function well at start of run but improved post TPA infusion yesterday.     Review of Systems:   I reviewed the following systems:  GI: good appetite. no nausea or vomiting or diarrhea.   Neuro:  no confusion  Constitutional:  no fever or chills  CV: no dyspnea or edema.     Physical Exam:   I/O last 3 completed shifts:  In: 1203 [P.O.:1200;  "I.V.:3]  Out: 0    /75 (BP Location: Right arm)  Pulse 100  Temp 98.7  F (37.1  C) (Oral)  Resp 16  Ht 1.753 m (5' 9\")  Wt 73.5 kg (162 lb 1.6 oz)  SpO2 100%  BMI 23.94 kg/m2     GENERAL APPEARANCE: NAD  PULM: lungs clear to auscultation,  bilaterally, equal air movement, no clubbing  CV: regular rhythm, normal rate, no rub     -edema none    INTEGUMENT: no cyanosis, no rash  Access RIJ tunnel     Labs:   All labs reviewed by me  Electrolytes/Renal -   Recent Labs   Lab Test  02/11/18   0658  02/10/18   0617  02/08/18   0631  02/07/18   0718   01/12/18   0742   11/15/17   0758  11/14/17   0645   NA   --   133  137  137   < >  137   < >  144  141   POTASSIUM  4.2  4.3  4.3  5.0   < >  3.5   < >  3.2*  3.7   CHLORIDE   --   98  102  102   < >  98   < >  103  103   CO2   --   23  23  24   < >  32   < >  30  27   BUN   --   25  23  30   < >  38*   < >  69*  76*   CR   --   5.46*  4.59*  5.08*   < >  8.27*   < >  6.98*  6.85*   GLC   --   143*  176*  168*   < >  250*   < >  294*  312*   TRINI   --   8.4*  9.2  9.3   < >  8.4*   < >  8.9  8.9   MAG  1.8  1.6  1.9  2.1   < >  1.7   < >  1.9  1.8   PHOS   --    --    --    --    --   3.3   --   5.0*  4.3    < > = values in this interval not displayed.       CBC -   Recent Labs   Lab Test  02/11/18   0658  02/06/18   0612  02/05/18   0629  02/03/18   0613   WBC  6.4  8.3   --   8.4   HGB  10.0*  10.1*   --   9.5*   PLT  203  192  213  252       LFTs -   Recent Labs   Lab Test  02/02/18   1736  01/26/18   1446  01/07/18   1655  11/13/17   1709   ALKPHOS  86  95  76  104   BILITOTAL  0.4  0.4  0.4  0.8   ALT  16  16  24  26   AST  20  23  32  22   PROTTOTAL  6.2*   --   6.0*  6.0*   ALBUMIN  2.1*   --   1.8*  2.3*       Iron Panel -   Recent Labs   Lab Test  07/19/17   1306  07/05/17   1204  06/21/17   1058   IRON  46  26*  69   IRONSAT  18  12*  29   ALBERTINA  369  542*  557*         Imaging:  All imaging studies reviewed by me.     Current Medications:    isosorbide " dinitrate  20 mg Oral TID     hydrALAZINE  25 mg Oral TID     sodium chloride (PF)  3 mL Intracatheter Q8H     insulin aspart  1-7 Units Subcutaneous TID AC     insulin aspart  1-5 Units Subcutaneous At Bedtime     allopurinol  400 mg Oral Daily     heparin  5,000 Units Subcutaneous Q12H     aspirin  81 mg Oral Daily     atorvastatin  40 mg Oral Daily     calcium acetate  667 mg Oral TID w/meals     omeprazole  20 mg Oral At Bedtime     predniSONE  5 mg Oral Daily       Aleyda Darling MD

## 2018-02-11 NOTE — PLAN OF CARE
Problem: Patient Care Overview  Goal: Plan of Care/Patient Progress Review  D: Pt stable, ST. No pain or shortness of breath noted.  I: Monitored/assessed pt. Assisted with cares.  A: Pt stable and comfortable.  P: Continue to monitor/assess pt, contact provider with concerns.

## 2018-02-11 NOTE — PLAN OF CARE
Problem: Patient Care Overview  Goal: Plan of Care/Patient Progress Review  Outcome: No Change  D/I/A: Patient's BP low this morning, improved after a little PO intake and rest (MD d/c'd isosorbide, felt lightheaded while in the bathroom). Other VSS. ST. Denies pain. Dialysis CVC TPA locked. Up ad edith.      P: Continue to monitor and notify providers as needed.

## 2018-02-12 ENCOUNTER — APPOINTMENT (OUTPATIENT)
Dept: OCCUPATIONAL THERAPY | Facility: CLINIC | Age: 63
DRG: 286 | End: 2018-02-12
Payer: COMMERCIAL

## 2018-02-12 LAB
ANION GAP SERPL CALCULATED.3IONS-SCNC: 12 MMOL/L (ref 3–14)
BUN SERPL-MCNC: 37 MG/DL (ref 7–30)
CALCIUM SERPL-MCNC: 8.8 MG/DL (ref 8.5–10.1)
CHLORIDE SERPL-SCNC: 103 MMOL/L (ref 94–109)
CO2 SERPL-SCNC: 22 MMOL/L (ref 20–32)
CREAT SERPL-MCNC: 7.12 MG/DL (ref 0.66–1.25)
FUNGUS SPEC CULT: ABNORMAL
GFR SERPL CREATININE-BSD FRML MDRD: 8 ML/MIN/1.7M2
GLUCOSE BLDC GLUCOMTR-MCNC: 121 MG/DL (ref 70–99)
GLUCOSE BLDC GLUCOMTR-MCNC: 174 MG/DL (ref 70–99)
GLUCOSE BLDC GLUCOMTR-MCNC: 175 MG/DL (ref 70–99)
GLUCOSE BLDC GLUCOMTR-MCNC: 241 MG/DL (ref 70–99)
GLUCOSE SERPL-MCNC: 121 MG/DL (ref 70–99)
MAGNESIUM SERPL-MCNC: 1.9 MG/DL (ref 1.6–2.3)
PHOSPHATE SERPL-MCNC: 4.1 MG/DL (ref 2.5–4.5)
POTASSIUM SERPL-SCNC: 4.5 MMOL/L (ref 3.4–5.3)
SODIUM SERPL-SCNC: 136 MMOL/L (ref 133–144)
SPECIMEN SOURCE: ABNORMAL
SPECIMEN SOURCE: ABNORMAL

## 2018-02-12 PROCEDURE — 25000132 ZZH RX MED GY IP 250 OP 250 PS 637: Performed by: INTERNAL MEDICINE

## 2018-02-12 PROCEDURE — 83735 ASSAY OF MAGNESIUM: CPT | Performed by: INTERNAL MEDICINE

## 2018-02-12 PROCEDURE — 25000132 ZZH RX MED GY IP 250 OP 250 PS 637: Performed by: STUDENT IN AN ORGANIZED HEALTH CARE EDUCATION/TRAINING PROGRAM

## 2018-02-12 PROCEDURE — 25000128 H RX IP 250 OP 636: Performed by: INTERNAL MEDICINE

## 2018-02-12 PROCEDURE — 40000133 ZZH STATISTIC OT WARD VISIT

## 2018-02-12 PROCEDURE — 25000125 ZZHC RX 250: Performed by: INTERNAL MEDICINE

## 2018-02-12 PROCEDURE — 97110 THERAPEUTIC EXERCISES: CPT | Mod: GO

## 2018-02-12 PROCEDURE — 21400006 ZZH R&B CCU INTERMEDIATE UMMC

## 2018-02-12 PROCEDURE — 97530 THERAPEUTIC ACTIVITIES: CPT | Mod: GO

## 2018-02-12 PROCEDURE — 36415 COLL VENOUS BLD VENIPUNCTURE: CPT | Performed by: INTERNAL MEDICINE

## 2018-02-12 PROCEDURE — 00000146 ZZHCL STATISTIC GLUCOSE BY METER IP

## 2018-02-12 PROCEDURE — 97535 SELF CARE MNGMENT TRAINING: CPT | Mod: GO

## 2018-02-12 PROCEDURE — 99232 SBSQ HOSP IP/OBS MODERATE 35: CPT | Mod: GC | Performed by: INTERNAL MEDICINE

## 2018-02-12 PROCEDURE — 80048 BASIC METABOLIC PNL TOTAL CA: CPT | Performed by: INTERNAL MEDICINE

## 2018-02-12 PROCEDURE — 84100 ASSAY OF PHOSPHORUS: CPT | Performed by: INTERNAL MEDICINE

## 2018-02-12 RX ORDER — ISOSORBIDE DINITRATE 10 MG/1
10 TABLET ORAL 3 TIMES DAILY
Status: DISCONTINUED | OUTPATIENT
Start: 2018-02-12 | End: 2018-02-13

## 2018-02-12 RX ADMIN — HYDRALAZINE HYDROCHLORIDE 25 MG: 25 TABLET ORAL at 08:38

## 2018-02-12 RX ADMIN — HEPARIN SODIUM 5000 UNITS: 5000 INJECTION, SOLUTION INTRAVENOUS; SUBCUTANEOUS at 20:04

## 2018-02-12 RX ADMIN — ISOSORBIDE DINITRATE 10 MG: 10 TABLET ORAL at 20:05

## 2018-02-12 RX ADMIN — CALCIUM ACETATE 667 MG: 667 CAPSULE ORAL at 17:25

## 2018-02-12 RX ADMIN — CALCIUM ACETATE 667 MG: 667 CAPSULE ORAL at 13:44

## 2018-02-12 RX ADMIN — OMEPRAZOLE 20 MG: 20 CAPSULE, DELAYED RELEASE ORAL at 22:24

## 2018-02-12 RX ADMIN — CALCIUM ACETATE 667 MG: 667 CAPSULE ORAL at 08:37

## 2018-02-12 RX ADMIN — HEPARIN SODIUM 5000 UNITS: 5000 INJECTION, SOLUTION INTRAVENOUS; SUBCUTANEOUS at 08:39

## 2018-02-12 RX ADMIN — PREDNISONE 5 MG: 5 TABLET ORAL at 08:38

## 2018-02-12 RX ADMIN — ALLOPURINOL 400 MG: 300 TABLET ORAL at 08:38

## 2018-02-12 RX ADMIN — ATORVASTATIN CALCIUM 40 MG: 40 TABLET, FILM COATED ORAL at 08:38

## 2018-02-12 RX ADMIN — HYDRALAZINE HYDROCHLORIDE 25 MG: 25 TABLET ORAL at 13:44

## 2018-02-12 RX ADMIN — HYDRALAZINE HYDROCHLORIDE 25 MG: 25 TABLET ORAL at 20:05

## 2018-02-12 RX ADMIN — ISOSORBIDE DINITRATE 10 MG: 10 TABLET ORAL at 15:01

## 2018-02-12 RX ADMIN — ASPIRIN 81 MG CHEWABLE TABLET 81 MG: 81 TABLET CHEWABLE at 08:38

## 2018-02-12 ASSESSMENT — ACTIVITIES OF DAILY LIVING (ADL): PREVIOUS_RESPONSIBILITIES: MEAL PREP;HOUSEKEEPING;LAUNDRY;SHOPPING;MEDICATION MANAGEMENT;FINANCES;DRIVING;WORK

## 2018-02-12 NOTE — PROGRESS NOTES
Nephrology Progress Note  02/12/2018         Assessment & Recommendations:     Murray Nicholson is a 63 yr old male with PMH of HTN, DMII, functionally bicuspid aortic valve, CHF/CM (EF 10-15%), ESRD, admitted with SOB and worsening heart failure.    ESKD: due to DMII, on PD since Aug 2017 (through [facility]), changed to IHD 1/2018 due to polymicrobial and fungal peritonitis, now schedule TTS, at [facility] under care of Dr Mcpherson, via tunneled RIJ, Run time: 3hrs, EDW 80 kg (now ~ 74 kg)  - Dialysis per TTS schedule at this point  - Heparin lock CVC  - Per Dr. Mcpherson, can consider trying PD again and will talk to patient more in outpatient setting  - Is also being discussed for possible heart-kidney transplant    Volume: dry weight challenged and reduced from 80 to 74 kg with significant improvement in PCW (from 37 to 13).  - Hypotension will limit his ability for volume removal as an outpt    - Continue to stress the importance of strict fluid restriction (recommend fluid and Na restriction while here)  - Daily standing weights     BP/congestive CM (EF 10-15%); minimal CAD per angio on 2/8/17. BP's currently in 105-110's with ongoing tachycardia in the 115's  - Goal MAP > 65 and SBP > 95 while dialyzing  - BP meds per cardiology in setting of HF     Anemia: hgb 10.0 (2/11).   - Will start low dose epogen 2000 units per HD     BMD: calcium 8.8, alb 2.1 (2/2), phos 4.1; on Hectorol 1 mcg via HD; on phoslo 1 tab TID with meals.   - Continue hectorol via HD  - Continue Phoslo       Interval History :   Seen bedside, feeling fairly good today. Discussed possibly retrying PD, he would like to think about it and discuss with Dr. Mcpherson. Discussed importance of fluid restriction. Also discussed that cardiology is considering heart-kidney tx. Denies n/v, CP, chills    Review of Systems:   4 point ROS negative other than as noted above.    Physical Exam:   I/O last 3 completed shifts:  In: 2115 [P.O.:2112;  "I.V.:3]  Out: -    /70 (BP Location: Right arm)  Pulse 113  Temp 97.9  F (36.6  C) (Oral)  Resp 16  Ht 1.753 m (5' 9\")  Wt 74.4 kg (164 lb 1.6 oz)  SpO2 100%  BMI 24.23 kg/m2     GENERAL APPEARANCE: NAD, alert  PULM: lungs clear to auscultation,  bilaterally, equal air movement   CV: regular rhythm, tachycardia     -edema none    INTEGUMENT: no cyanosis, no rash  Access tunneled [facility]      Labs:   All labs reviewed by me  Electrolytes/Renal -   Recent Labs   Lab Test  02/12/18   0656  02/11/18   0658  02/10/18   0617  02/08/18   0631   01/12/18   0742   11/15/17   0758   NA  136   --   133  137   < >  137   < >  144   POTASSIUM  4.5  4.2  4.3  4.3   < >  3.5   < >  3.2*   CHLORIDE  103   --   98  102   < >  98   < >  103   CO2  22   --   23  23   < >  32   < >  30   BUN  37*   --   25  23   < >  38*   < >  69*   CR  7.12*   --   5.46*  4.59*   < >  8.27*   < >  6.98*   GLC  121*   --   143*  176*   < >  250*   < >  294*   TRINI  8.8   --   8.4*  9.2   < >  8.4*   < >  8.9   MAG  1.9  1.8  1.6  1.9   < >  1.7   < >  1.9   PHOS  4.1   --    --    --    --   3.3   --   5.0*    < > = values in this interval not displayed.       CBC -   Recent Labs   Lab Test  02/11/18   0658  02/06/18   0612  02/05/18   0629  02/03/18   0613   WBC  6.4  8.3   --   8.4   HGB  10.0*  10.1*   --   9.5*   PLT  203  192  213  252       LFTs -   Recent Labs   Lab Test  02/02/18   1736  01/26/18   1446  01/07/18   1655  11/13/17   1709   ALKPHOS  86  95  76  104   BILITOTAL  0.4  0.4  0.4  0.8   ALT  16  16  24  26   AST  20  23  32  22   PROTTOTAL  6.2*   --   6.0*  6.0*   ALBUMIN  2.1*   --   1.8*  2.3*       Iron Panel -   Recent Labs   Lab Test  07/19/17   1306  07/05/17   1204  06/21/17   1058   IRON  46  26*  69   IRONSAT  18  12*  29   ALBERTINA  369  542*  557*         Imaging:  All imaging studies reviewed by me.     Current Medications:    isosorbide dinitrate  10 mg Oral TID     hydrALAZINE  25 mg Oral TID     sodium chloride " (PF)  3 mL Intracatheter Q8H     insulin aspart  1-7 Units Subcutaneous TID AC     insulin aspart  1-5 Units Subcutaneous At Bedtime     allopurinol  400 mg Oral Daily     heparin  5,000 Units Subcutaneous Q12H     aspirin  81 mg Oral Daily     atorvastatin  40 mg Oral Daily     calcium acetate  667 mg Oral TID w/meals     omeprazole  20 mg Oral At Bedtime     predniSONE  5 mg Oral Daily       Kristi M SPENCER Armas

## 2018-02-12 NOTE — DISCHARGE SUMMARY
Cardiology Discharge Summary  Murray Nicholson MRN: [id number]  : 1955  Date of Admission:2018  Home clinic: [facility]  Primary care provider: Ana Luisa Mcpherson  ___________________________________          Date of Admission:  2018  Date of Discharge:  2018  Admitting Physician:  Deandre Muñoz MD  Discharge Physician:  Klever Ernst  Discharging Service:  Cards 2          Reason for Admission:     Murray Nicholson is a 63 year old male with a history of non-obstructive CAD, HFrEF (LVEF 10-15%), functionally bicuspid AV with ascending aortic aneurysm, ESRD on HD, HTN, T2DM who presented from Echo lab with worsening dyspnea on exertion and orthopnea in the setting of worsening LVEF. He said that his shortness of breath has been slowly progressive over the past few years but more acutely over the past month. He unable to climb the 2 flights of stairs to get to his apartment without resting and reports gasping for breath at night occasionally but is able to lie flat. He denies chest pain, palpitations, lightheadedness, cough, wheezing, fevers, leg swelling or increased abdominal girth. He has been admitted multiple times this year for both cardiac and renal problems, including 2017 for CHF exacerbation with EF decreased to 20-25% (from 40-45% in 2016) and 2018 with peritonitis in the setting of PD and influenza. Patient was started on HD at that time however he has been experiencing hypotension at dialysis so most of his cardiac medications were discontinued or decreased. He was seen in cardiology clinic on  for his SOB and an ECHO was ordered which revealed an EF of 10-15% and so he was sent to the ED for further evaluation.           Discharge Diagnosis:     1. Acute on Chronic HFrEF (EF 10-15%)   2. ESRD on HD  3. Type II DM  4. Bicuspid Aortic Valve  5. Ascending Aortic Aneurysm          Procedures & Significant Findings:     RIGHT HEART CATH (2/8/18)     HEMODYNAMICS:  1.  bpm  2. /74/88 mmHg  3. RA 2/2/1   4. RV 27/1  5. PA 27/16/22   6. PCW 15/15/13   7. PA sat 44.7%   8. PCW sat 95.8%  9. Hgb 11.0 g/dL   10. Kassi CO 3.1   11. Kassi CI 1.6   12. TD CO deferred   13. TD CI deferred   14. PVR 3.0  15. TPR 7.2      Summary:      Pre-Angiography:  >> Low right sided filling pressures.  >> Normal left sided filling pressures.  >> Normal pulmonary artery pressures.  >> Mild to moderately low cardiac output as assessed by Kassi.      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  bpm  2. BP 97/62/77 mmHg  3. RA 18/13/12   4. RV 56/18  5. PA 56/37/46   6. PCW 45/36/37   7. PA sat 32.9%   8. PCW sat 98.1% (confirmed x2)  9. Hgb 8.9 g/dL   10. Kassi CO 3.16   11. Kassi CI 1.63  12. SVR 1645  14. PVR 2.85    Summary:  >> High right sided filling pressures.  >> Very High left sided filling pressures.  >> Severe pulmonary artery hypertension  >> High left ventricular filling pressures.  >> Reduced cardiac output, 3.16 L/min with index 1.63 L/min/m2   *Coronary angiogram deferred given elevated left sided filling pressures.    CXR (2/2/18)    Decreased hazy airspace opacities compared with 1/12/2018,  now predominantly in the right lower lung field. Findings likely  represent resolving infection, although new infection cannot be excluded.         Consultations:     1. Nephrology (2/3/18)  2. Diabetes Education (2/6/18)         Hospital Course by Problem:      Acute on chronic HFrEF (EF 10-15%, last echo 2/2)  Etiology likely hypertensive cardiomyopathy. RHC on 2/3 with elevated LV and RV pressures (56/18, PCWP ~40). Cause of decompensation likely due to  "inadequate removal of fluid with HD due to hypotension. Repeat RHC on 2/8 with improved right and left pressures. EDW ~162 lb. Discharge wt ~163 lb.  - BB: holding due to hypotension  - ACEI/ARB: held due to hypotension, restarted losartan 25 mg Qday on 2/14, no issues  - Afterload: did not tolerate hydralazine 25 mg TID, imdur 10 mg TID, discontinued on 2/13  - Aldosterone antagonist: ESRD  - CAD: ASA 81 mg, statin 40 mg  - F/u in core cardiology clinic in 1 week  - F/u with Dr Ernst for heart-kidney transplant work-up in 2-3 wk      ESRD on HD (TTS), EDW ~162 lb  Previous consideration for kidney tx, but w/u not completed. On peritoneal dialysis until 1/2018 after developing peritonitis. Nephrology followed during hospitalization. Started Epogen 4000 units Q3 weeks (started 2/6). May be candidate for heart-renal transplant. Work-up as outpatient.       Bicuspid aortic valve  Ascending aortic aneurysm  Most recent echo 2/2/2018. Moderate AI. Last cardiac MRI 2/2014 with aortic root 4.5 x 4.5 cm and prox ascending aorta 4.1 x 4.3 cm. Will need routine monitoring through cardiology clinic      DM2  A1c 8.6% (2/2/2018). Home glipizide held, restarted on d/c.      H/o Gout  Continued pred 5 mg Qday, allopurinol 400 mg Qday      -----------------------------------------------------------------------------------------------------------------------------------------    Physical Exam on day of Discharge:  Blood pressure 111/69, pulse 114, temperature 98.5  F (36.9  C), temperature source Oral, resp. rate 16, height 1.753 m (5' 9\"), weight 74.3 kg (163 lb 11.2 oz), SpO2 100 %.    GEN: Alert & oriented, appears comfortable, NAD  CV: Tachycardic, regular rhythm, S3+, no JVD  LUNGS: Clear to auscultation bilaterally, no wheezes or crackles  ABD: Active bowel sounds, soft, non-tender/non-distended  EXT: No edema or cyanosis.  NEURO: CN II-XII grossly intact, no focal deficits on limited exam    Lines/Tubes:  R IJ CVC        "  Pending Results:     None         Discharge Medications:     Current Discharge Medication List      CONTINUE these medications which have NOT CHANGED    Details   LOSARTAN POTASSIUM PO Take 25 mg by mouth daily      traMADol (ULTRAM) 50 MG tablet Take 1 tablet (50 mg) by mouth every 6 hours as needed for moderate pain  Qty: 50 tablet, Refills: 0    Associated Diagnoses: Abdominal pain, generalized      B Complex-Biotin-FA (B-COMPLEX PO) Take 1 tablet by mouth daily      ACETAMINOPHEN PO Take 500-1,000 mg by mouth nightly as needed for pain      bismuth subsalicylate (PEPTO BISMOL) 262 MG/15ML suspension Take 15 mLs by mouth every 6 hours as needed for indigestion      calcium acetate, Phos Binder, 667 MG TABS Take 1 tablet by mouth 3 times daily (with meals)  Qty: 180 tablet, Refills: 3    Associated Diagnoses: Hyperphosphatemia      predniSONE (DELTASONE) 5 MG tablet Take 1 tablet (5 mg) by mouth daily  Qty: 90 tablet, Refills: 1    Associated Diagnoses: Gout, unspecified cause, unspecified chronicity, unspecified site; Chronic gout of wrist, unspecified cause, unspecified laterality      !! allopurinol (ZYLOPRIM) 100 MG tablet Take 1 tablet (100 mg) by mouth daily Take with 300 mg tabs for 400 mg /day total.  Qty: 30 tablet, Refills: 2    Associated Diagnoses: Chronic gout of wrist, unspecified cause, unspecified laterality; Gout, unspecified cause, unspecified chronicity, unspecified site      !! allopurinol (ZYLOPRIM) 300 MG tablet Take 1 tablet (300 mg) by mouth daily Take with 100 mg tabs for 400 mg /day total.  Qty: 30 tablet, Refills: 2    Associated Diagnoses: Chronic gout of wrist, unspecified cause, unspecified laterality; Gout, unspecified cause, unspecified chronicity, unspecified site      albuterol (PROAIR HFA/PROVENTIL HFA/VENTOLIN HFA) 108 (90 BASE) MCG/ACT Inhaler Inhale 2 puffs into the lungs every 6 hours as needed for shortness of breath / dyspnea or wheezing  Qty: 1 Inhaler, Refills: 0     Associated Diagnoses: Cough      fluticasone (FLONASE) 50 MCG/ACT spray Spray 1-2 sprays into both nostrils daily  Qty: 16 g, Refills: 11    Associated Diagnoses: Nasal congestion      atorvastatin (LIPITOR) 40 MG tablet Take 1 tablet (40 mg) by mouth daily  Qty: 90 tablet, Refills: 3    Associated Diagnoses: CKD (chronic kidney disease) stage 3, GFR 30-59 ml/min; Hyperlipidemia LDL goal <100      omeprazole (PRILOSEC) 20 MG CR capsule Take 20 mg by mouth daily At night      loratadine (CLARITIN) 10 MG tablet Take 10 mg by mouth daily as needed Reported on 5/3/2017    Associated Diagnoses: Hypertensive cardiopathy; SOB (shortness of breath)      ASPIRIN 81 MG OR TABS Take 1 tablet (81 mg) by mouth at bedtime      order for DME Equipment being ordered: Carol ()  Treatment Diagnosis: ESRD on PD  Pt has to be connected to PD all night and can not be disconnected, hence impending his mobility to go to the bathroom. At risk for infection if he does not have this equipment.  Qty: 1 Units, Refills: 0    Associated Diagnoses: CKD (chronic kidney disease) stage 5, GFR less than 15 ml/min (H)       !! - Potential duplicate medications found. Please discuss with provider.      STOP taking these medications       metoprolol succinate (TOPROL-XL) 25 MG 24 hr tablet Comments:   Reason for Stopping:         torsemide (DEMADEX) 100 MG tablet Comments:   Reason for Stopping:         isosorbide mononitrate (IMDUR) 60 MG 24 hr tablet Comments:   Reason for Stopping:                  Discharge Instructions and Follow-Up:     Discharge Procedure Orders  Medication Therapy Management Referral   Referral Type: Med Therapy Management     CARDIAC REHAB REFERRAL   Referral Type: Rehab Therapy Cardiac Therapy     Follow Up and recommended labs and tests   [facility]   Phone: [phone]   Fax: [phone]    For resumption of outpatient dialysis on T-Th-Sat at 6:15 AM.     Reason for your hospital  stay   Order Comments: You were hospitalized for fluid overload likely due to inadequate fluid removal during dialysis. This occurred in the setting of low blood pressures. Some of your previous home meds have been held due to low blood pressure. Continue on losartan as prescribed. Follow up with core clinic within 1 week. Follow up within 2-3 weeks with Dr Ernst for continued work-up and discussion of heart-kidney transplant.     Adult [facility] Follow-up and recommended labs and tests   Order Comments: Follow up with Dr. Ernst , at [facility] cardiology clinic, within 2-3 weeks  to evaluate for heart-kidney transplant.    Appointments on [place] and/or [facility] (with [facility] or [facility] provider or service). Call [phone] if you haven't heard regarding these appointments within 7 days of discharge.     Follow Up and recommended labs and tests   Order Comments: Follow up with cardiology core clinic within 1 week with CBC/BMP check     Activity   Order Comments: Your activity upon discharge: activity as tolerated   Order Specific Question Answer Comments   Is discharge order? Yes      When to contact your care team   Order Comments: Call cardiology clinic if you have any of the following:  Severely increased shortness of breath, severe dizziness or falls, chest pain.     Diet   Order Comments: Follow this diet upon discharge: Orders Placed This Encounter     Fluid restriction 2000 ML FLUID     2 Gram Sodium Diet   Order Specific Question Answer Comments   Is discharge order? Yes             Discharge Disposition:     Home          Condition on Discharge:   Discharge condition: Stable   Code status on discharge: Full code      Date of service: 2/14/2018    Patient was seen and discussed with Dr. Klever Ernst.    Jeison Smith MD, MPH  Internal Medicine, PGY2

## 2018-02-12 NOTE — PROGRESS NOTES
Cardiology Progress Note    ASSESSMENT/PLAN:  62 y/o man with non-obstructive CAD, ICM with HFrEF (10-15%), functionally bicuspid AV with ascending aortic aneurysm, ESRD on HD (TTS), DM2, presented with MEADE/orthopnea, found to have worsened EF (10-15%), underwent dialysis with improvement of PCWP (45 --> 15).    Acute on chronic HFrEF (EF 10-15%, last echo 2/2)  Etiology likely hypertensive cardiomyopathy. RHC on 2/3 with elevated LV and RV pressures (56/18, PCWP ~40). Cause of decompensation likely due to inadequate removal of fluid with HD due to hypotension. Repeat RHC on 2/8 with improved right and left pressures. EDW ~162 lb.  - BB: holding due to hypotension  - ACEI: holding due to hypotension  - Afterload: hydralazine 25 mg TID, try imdur 10 mg TID  - Aldosterone antagonist: ESRD  - CAD: ASA 81 mg, statin 40 mg    ESRD on HD (TTS)  Previous consideration for kidney tx, but w/u not completed. On peritoneal dialysis until 1/2018 after developing peritonitis.  - nephrology following  - Continue Epogen 4000 units Q3 weeks, started 2/6 per renal  - may be candidate for heart-renal transplant    Bicuspid aortic valve  Ascending aortic aneurysm  Most recent echo 2/2/2018. Moderate AI. Last cardiac MRI 2/2014 with aortic root 4.5 x 4.5 cm and prox ascending aorta 4.1 x 4.3 cm.   - Routine monitoring through cardiology clinic    DM2  A1c 8.6% (2/2/2018).  - hold home glipizide  - medium ISS    H/o Gout  - pred 5 mg Qday  - allopurinol 400 mg Qday    Code status: Full  Diet/Nutrition: cardiac diet, fluid restrict  DVT prophy: heparin SQ  Discharge plan: 1-2 days    Pt seen and discussed with Dr Ernst.    Jeison Smith MD, MPH  Internal Medicine, PGY-2  [phone]    --------------------------------------------------------------------------------------    Interval Hx: No acute events overnight. Pt still reporting some dyspnea on exertion, but says it is slightly better than previously. Denies CP. Good appetite, having  BMs.    Temp: 97.9  F (36.6  C) Temp  Min: 97.9  F (36.6  C)  Max: 98.1  F (36.7  C)  Resp: 16 Resp  Min: 16  Max: 18  SpO2: 100 % SpO2  Min: 95 %  Max: 100 %  Heart Rate: 116 Heart Rate  Min: 110  Max: 119  BP: 121/78 Systolic (24hrs), Av , Min:101 , Max:121   Diastolic (24hrs), Av, Min:63, Max:86    Wt: on admit 173 lb, yday 162 lb, today 164 lb    Phys exam:  GEN: NAD  Pulm: CTAB  Cardiac: Tachycardic, regular rhythm   GI: soft, non distended  Neuro: CN 2-12 grossly intact    Radiology Imaging  reviewed    Cardiology Studies:  reviewed    Labs: Reviewed in epic

## 2018-02-12 NOTE — PLAN OF CARE
Problem: Patient Care Overview  Goal: Plan of Care/Patient Progress Review  Discharge Planner OT   Patient plan for discharge: Home  Current status: Educated pt on OT role and POC. Educated pt on CHF and provided CHF folder including education regarding lifestyle management, importance of home programming, diet, and education regarding OP CR Phase II. Pt independent with basic ADLs including UB and LB dressing. Independent with sit <> stand, completed ~475 ft with x 1 seated rest break with SBA. VSS: -123, BP 96/68 (72), SpO2 90-92% on room air.   Barriers to return to prior living situation: medical stability, deconditioning  Recommendations for discharge: Home with OP CR Phase II  Rationale for recommendations: Pt would benefit from continued therapy at OP CR Phase II to progress strength and activity tolerance in order to maximize independence to complete ADLs/IADLs at OF.        Entered by: Edith Bloom 02/12/2018 2:20 PM

## 2018-02-12 NOTE — PROGRESS NOTES
02/12/18 0911   Quick Adds   Type of Visit Initial Occupational Therapy Evaluation   Living Environment   Lives With alone   Living Arrangements apartment   Home Accessibility stairs to enter home;stairs within home;tub/shower is not walk in;bed and bath on same level   Number of Stairs to Enter Home 6   Number of Stairs Within Home 24   Stair Railings at Home outside, present on right side;inside, present on right side   Transportation Available car;family or friend will provide   Living Environment Comment Pt lives alone in 2nd level apartment. 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 Pt has 2 sons that live in the area, reports they can assist minimally if needed as both sons work.   Self-Care   Dominant Hand left   Usual Activity Tolerance good   Current Activity Tolerance fair   Regular Exercise no   Equipment Currently Used at Home none   Functional Level Prior   Ambulation 0-->independent   Transferring 0-->independent   Toileting 0-->independent   Bathing 0-->independent   Dressing 0-->independent   Eating 0-->independent   Communication 0-->understands/communicates without difficulty   Swallowing 0-->swallows foods/liquids without difficulty   Cognition 0 - no cognition issues reported   Fall history within last six months no   Which of the above functional risks had a recent onset or change? none   Prior Functional Level Comment Pt previously independent with all ADLs/IADLs   General Information   Onset of Illness/Injury or Date of Surgery - Date 02/02/18   Referring Physician Mellisa Brunner MD   Patient/Family Goals Statement To return to apartment   Additional Occupational Profile Info/Pertinent History of Current Problem Murray Nicholson is a 63 year old male with a history of non-obstructive CAD, HFrEF (LVEF 10-15%), functionally bicuspid AV with ascending aortic aneurysm, ESRD on HD, HTN, T2DM who presented from Echo lab with worsening dyspnea  on exertion and orthopnea in the setting of worsening LVEF. He said that his shortness of breath has been slowly progressive over the past few years but more acutely over the past month. He unable to climb the 2 flights of stairs to get to his apartment without resting and reports gasping for breath at night occasionally but is able to lie flat. He denies chest pain, palpitations, lightheadedness, cough, wheezing, fevers, leg swelling or increased abdominal girth. He has been admitted multiple times this year for both cardiac and renal problems, including April 2017 for CHF exacerbation with EF decreased to 20-25% (from 40-45% in November 2016) and January 2018 with peritonitis in the setting of PD and influenza. Patient was started on HD at that time however he has been experiencing hypotension at dialysis so most of his cardiac medications were discontinued or decreased. He was seen in cardiology clinic on 2/1 for his SOB and an ECHO was ordered which revealed an EF of 10-15% and so he was sent to the ED for further evaluation.    Precautions/Limitations no known precautions/limitations   Weight-Bearing Status - LUE full weight-bearing   Weight-Bearing Status - RUE full weight-bearing   Weight-Bearing Status - LLE full weight-bearing   Weight-Bearing Status - RLE full weight-bearing   Heart Disease Risk Factors Smoking;Family history;Medical history;Gender;Age;Lack of physical activity   General Observations Pt supine upon arrival, agreeable to therapy.   General Info Comments up ad edith   Cognitive Status Examination   Orientation orientation to person, place and time   Level of Consciousness alert   Able to Follow Commands WNL/WFL   Personal Safety (Cognitive) WNL/WFL   Memory intact   Attention No deficits were identified   Visual Perception   Visual Perception No deficits were identified;Wears glasses   Sensory Examination   Sensory Quick Adds No deficits were identified   Pain Assessment   Patient Currently in  Pain No   Integumentary/Edema   Integumentary/Edema no deficits were identifed   Posture   Posture not impaired   Range of Motion (ROM)   ROM Quick Adds No deficits were identified   Strength   Manual Muscle Testing Quick Adds Other   Strength Comments Pt with fair muscle strength in BUEs, pt scoring 4+/5. Pt reporting decreased activity tolerance impacting independence   Hand Strength   Hand Strength Comments WFL   Muscle Tone Assessment   Muscle Tone Quick Adds No deficits were identified   Coordination   Upper Extremity Coordination No deficits were identified   Mobility   Bed Mobility Comments Independent   Transfer Skill: Sit to Stand   Level of [place]: Sit/Stand independent   Upper Body Dressing   Level of [place]: Dress Upper Body independent   Lower Body Dressing   Level of [place]: Dress Lower Body independent   Eating/Self Feeding   Level of [place]: Eating independent   Instrumental Activities of Daily Living (IADL)   Previous Responsibilities meal prep;housekeeping;laundry;shopping;medication management;finances;driving;work   Activities of Daily Living Analysis   Impairments Contributing to Impaired Activities of Daily Living strength decreased  (CHF symptoms including SOB)   General Therapy Interventions   Planned Therapy Interventions IADL retraining;strengthening;risk factor education;progressive activity/exercise;home program guidelines;other (see comments)  (EC/WS)   Clinical Impression   Criteria for Skilled Therapeutic Interventions Met yes, treatment indicated   OT Diagnosis decreased independence in ADLs/IADLs   Influenced by the following impairments decreased strength/activity tolerance, CHF exacerbation, SOB   Assessment of Occupational Performance 3-5 Performance Deficits   Identified Performance Deficits household management, work, leisure, shopping   Clinical Decision Making (Complexity) Low complexity   Therapy Frequency 5 times/wk   Predicted Duration of Therapy  "Intervention (days/wks) 2 weeks   Anticipated Equipment Needs at Discharge (TBD)   Anticipated Discharge Disposition Home with Outpatient Therapy   Risks and Benefits of Treatment have been explained. Yes   Patient, Family & other staff in agreement with plan of care Yes   [facility] TM \"6 Clicks\"   2016, Trustees of [facility], under license to [employer].  All rights reserved.   6 Clicks Short Forms Daily Activity Inpatient Short Form   [facility]  \"6 Clicks\" Daily Activity Inpatient Short Form   1. Putting on and taking off regular lower body clothing? 4 - None   2. Bathing (including washing, rinsing, drying)? 3 - A Little   3. Toileting, which includes using toilet, bedpan or urinal? 4 - None   4. Putting on and taking off regular upper body clothing? 4 - None   5. Taking care of personal grooming such as brushing teeth? 4 - None   6. Eating meals? 4 - None   Daily Activity Raw Score (Score out of 24.Lower scores equate to lower levels of function) 23   Total Evaluation Time   Total Evaluation Time (Minutes) 6     "

## 2018-02-12 NOTE — PLAN OF CARE
Problem: Cardiac: Heart Failure (Adult)  Goal: Signs and Symptoms of Listed Potential Problems Will be Absent, Minimized or Managed (Cardiac: Heart Failure)  Signs and symptoms of listed potential problems will be absent, minimized or managed by discharge/transition of care (reference Cardiac: Heart Failure (Adult) CPG).   Outcome: Improving  D/I/A: Pt up ad edith in room.  A+O x4 and able to make needs known.  Denies pain or sob.  VSSA.  BP remains stable with BP medication changes made today.  No report of dizziness.  Pleasant and cooperative with cares and treatments.    P: Continue to monitor status.

## 2018-02-12 NOTE — PLAN OF CARE
Problem: Patient Care Overview  Goal: Plan of Care/Patient Progress Review  PT 6C: Will sign off. Per OT and chart review, no acute PT needs identified. Please defer to OT for d/c needs.

## 2018-02-13 ENCOUNTER — APPOINTMENT (OUTPATIENT)
Dept: OCCUPATIONAL THERAPY | Facility: CLINIC | Age: 63
DRG: 286 | End: 2018-02-13
Payer: COMMERCIAL

## 2018-02-13 LAB
GLUCOSE BLDC GLUCOMTR-MCNC: 119 MG/DL (ref 70–99)
GLUCOSE BLDC GLUCOMTR-MCNC: 142 MG/DL (ref 70–99)
GLUCOSE BLDC GLUCOMTR-MCNC: 229 MG/DL (ref 70–99)
GLUCOSE BLDC GLUCOMTR-MCNC: 264 MG/DL (ref 70–99)
GLUCOSE BLDC GLUCOMTR-MCNC: 270 MG/DL (ref 70–99)

## 2018-02-13 PROCEDURE — 97535 SELF CARE MNGMENT TRAINING: CPT | Mod: GO

## 2018-02-13 PROCEDURE — 25000132 ZZH RX MED GY IP 250 OP 250 PS 637: Performed by: STUDENT IN AN ORGANIZED HEALTH CARE EDUCATION/TRAINING PROGRAM

## 2018-02-13 PROCEDURE — 25000128 H RX IP 250 OP 636: Performed by: INTERNAL MEDICINE

## 2018-02-13 PROCEDURE — 99232 SBSQ HOSP IP/OBS MODERATE 35: CPT | Mod: GC | Performed by: INTERNAL MEDICINE

## 2018-02-13 PROCEDURE — 21400006 ZZH R&B CCU INTERMEDIATE UMMC

## 2018-02-13 PROCEDURE — 25000132 ZZH RX MED GY IP 250 OP 250 PS 637: Performed by: INTERNAL MEDICINE

## 2018-02-13 PROCEDURE — 00000146 ZZHCL STATISTIC GLUCOSE BY METER IP

## 2018-02-13 PROCEDURE — 25000125 ZZHC RX 250: Performed by: INTERNAL MEDICINE

## 2018-02-13 PROCEDURE — 63400005 ZZH RX 634: Performed by: INTERNAL MEDICINE

## 2018-02-13 PROCEDURE — 40000133 ZZH STATISTIC OT WARD VISIT

## 2018-02-13 PROCEDURE — 90937 HEMODIALYSIS REPEATED EVAL: CPT

## 2018-02-13 RX ORDER — LOSARTAN POTASSIUM 25 MG/1
25 TABLET ORAL DAILY
Status: DISCONTINUED | OUTPATIENT
Start: 2018-02-14 | End: 2018-02-13

## 2018-02-13 RX ORDER — LOSARTAN POTASSIUM 25 MG/1
25 TABLET ORAL DAILY
Status: DISCONTINUED | OUTPATIENT
Start: 2018-02-14 | End: 2018-02-14 | Stop reason: HOSPADM

## 2018-02-13 RX ORDER — DOXERCALCIFEROL 4 UG/2ML
1 INJECTION INTRAVENOUS
Status: COMPLETED | OUTPATIENT
Start: 2018-02-13 | End: 2018-02-13

## 2018-02-13 RX ORDER — LOSARTAN POTASSIUM 25 MG/1
25 TABLET ORAL AT BEDTIME
Status: DISCONTINUED | OUTPATIENT
Start: 2018-02-13 | End: 2018-02-13

## 2018-02-13 RX ADMIN — ALLOPURINOL 400 MG: 300 TABLET ORAL at 08:24

## 2018-02-13 RX ADMIN — HEPARIN SODIUM 5000 UNITS: 5000 INJECTION, SOLUTION INTRAVENOUS; SUBCUTANEOUS at 08:26

## 2018-02-13 RX ADMIN — HEPARIN SODIUM 5000 UNITS: 5000 INJECTION, SOLUTION INTRAVENOUS; SUBCUTANEOUS at 21:37

## 2018-02-13 RX ADMIN — EPOETIN ALFA 2000 UNITS: 10000 SOLUTION INTRAVENOUS; SUBCUTANEOUS at 10:36

## 2018-02-13 RX ADMIN — ASPIRIN 81 MG CHEWABLE TABLET 81 MG: 81 TABLET CHEWABLE at 08:24

## 2018-02-13 RX ADMIN — OMEPRAZOLE 20 MG: 20 CAPSULE, DELAYED RELEASE ORAL at 21:36

## 2018-02-13 RX ADMIN — Medication: at 09:20

## 2018-02-13 RX ADMIN — SODIUM CHLORIDE 250 ML: 9 INJECTION, SOLUTION INTRAVENOUS at 09:10

## 2018-02-13 RX ADMIN — HYDRALAZINE HYDROCHLORIDE 25 MG: 25 TABLET ORAL at 15:08

## 2018-02-13 RX ADMIN — SODIUM CHLORIDE 300 ML: 9 INJECTION, SOLUTION INTRAVENOUS at 09:10

## 2018-02-13 RX ADMIN — ATORVASTATIN CALCIUM 40 MG: 40 TABLET, FILM COATED ORAL at 08:24

## 2018-02-13 RX ADMIN — ISOSORBIDE DINITRATE 10 MG: 10 TABLET ORAL at 08:24

## 2018-02-13 RX ADMIN — PREDNISONE 5 MG: 5 TABLET ORAL at 08:36

## 2018-02-13 RX ADMIN — CALCIUM ACETATE 667 MG: 667 CAPSULE ORAL at 18:40

## 2018-02-13 RX ADMIN — ISOSORBIDE DINITRATE 10 MG: 10 TABLET ORAL at 15:08

## 2018-02-13 RX ADMIN — CALCIUM ACETATE 667 MG: 667 CAPSULE ORAL at 08:23

## 2018-02-13 RX ADMIN — DOXERCALCIFEROL 1 MCG: 4 INJECTION, SOLUTION INTRAVENOUS at 10:15

## 2018-02-13 RX ADMIN — CALCIUM ACETATE 667 MG: 667 CAPSULE ORAL at 13:38

## 2018-02-13 RX ADMIN — TRAMADOL HYDROCHLORIDE 50 MG: 50 TABLET, COATED ORAL at 21:36

## 2018-02-13 NOTE — PROGRESS NOTES
HEMODIALYSIS TREATMENT NOTE    Date: 2/13/2018  Time: 1:06 PM    Data:  Pre Wt: 74.8 kg (164 lb 14.5 oz)   Desired Wt: 74 kg   Weight change: - 0.8  kg  Ultrafiltration - Post Run Net Total Removed (mL): 800 mL  Ultrafiltration - Post Run Net Total Gain (mL): 0 mL  Vascular Access Status: Yes, secured and visible  Dialyzer Rinse: Streaked, Light  Total Blood Volume Processed: 51.3 Liters  Total Dialysis (Treatment) Time: 3.5 Liters     Interventions/Assessment:  Stable HD tx via right CVC. Keep SBP > 95 and MAP > 65 per order.  Standing weight 74.8 kg prior to tx. Lines reversed d/t high arterial pressures. . Treatment time increased to 3.5 hrs per NP Kristi for adequate clearance. Epogen and Hectorol given IV. 0.8 kg removed to achieve EDW of 74 kg. CVC heparin locked. Pt had no complaints during tx. Hand off report given to primary TONY England.      Plan:    Next HD tx per renal team. Pt to D/C today or tomorrow.

## 2018-02-13 NOTE — PROGRESS NOTES
"  Nephrology Progress Note  02/13/2018         Assessment & Recommendations:     Murray Nicholson is a 63 yr old male with PMH of HTN, DMII, functionally bicuspid aortic valve, CHF/CM (EF 10-15%), ESRD, admitted with SOB and worsening heart failure.    ESKD: due to DMII, on PD since Aug 2017 (through [facility]), changed to IHD 1/2018 due to polymicrobial and fungal peritonitis, now schedule TTS, at [facility] under care of Dr Mcpherson, via tunneled RIJ, Run time: 3hrs, EDW 80 kg (now ~ 74 kg)  - Dialysis per TTS schedule at this point  - Heparin lock CVC  - Per Dr. Mcpherson, can consider trying PD again and will talk to patient more in outpatient setting  - Is also being discussed for possible heart-kidney transplant    Volume: dry weight challenged and reduced from 80 to 74 kg with significant improvement in PCW (from 37 to 13).  - Hypotension will limit his ability for volume removal as an outpt    - Continue to stress the importance of strict fluid restriction (recommend fluid and Na restriction while here)  - Daily standing weights     BP/congestive CM (EF 10-15%); minimal CAD per angio on 2/8/17. BP's currently in 105-110's with ongoing tachycardia in the 115-120's  - Goal MAP > 65 and SBP > 95 while dialyzing  - BP meds per cardiology in setting of HF     Anemia: hgb 10.0 (2/11).   - Will start low dose epogen 2000 units per HD     BMD: calcium 8.8, alb 2.1 (2/2), phos 4.1; on Hectorol 1 mcg via HD; on phoslo 1 tab TID with meals.   - Continue hectorol via HD  - Continue Phoslo       Interval History :   Seen on dialysis, stable run thus far gentle UF to dry weight. Patient eating, has good appetite. Denies n/v, CP, chills    Review of Systems:   4 point ROS negative other than as noted above.    Physical Exam:   I/O last 3 completed shifts:  In: 983 [P.O.:980; I.V.:3]  Out: -    /76 (BP Location: Right arm)  Pulse 114  Temp 98  F (36.7  C) (Oral)  Resp 17  Ht 1.753 m (5' 9\")  Wt 74.8 kg (164 " lb 12.8 oz)  SpO2 100%  BMI 24.34 kg/m2     GENERAL APPEARANCE: NAD, alert  PULM: CTA  CV: regular rhythm, tachycardia     -edema none    INTEGUMENT: no cyanosis, no rash  Access tunneled RIJ      Labs:   All labs reviewed by me  Electrolytes/Renal -   Recent Labs   Lab Test  02/12/18   0656  02/11/18   0658  02/10/18   0617  02/08/18   0631   01/12/18   0742   11/15/17   0758   NA  136   --   133  137   < >  137   < >  144   POTASSIUM  4.5  4.2  4.3  4.3   < >  3.5   < >  3.2*   CHLORIDE  103   --   98  102   < >  98   < >  103   CO2  22   --   23  23   < >  32   < >  30   BUN  37*   --   25  23   < >  38*   < >  69*   CR  7.12*   --   5.46*  4.59*   < >  8.27*   < >  6.98*   GLC  121*   --   143*  176*   < >  250*   < >  294*   TRINI  8.8   --   8.4*  9.2   < >  8.4*   < >  8.9   MAG  1.9  1.8  1.6  1.9   < >  1.7   < >  1.9   PHOS  4.1   --    --    --    --   3.3   --   5.0*    < > = values in this interval not displayed.       CBC -   Recent Labs   Lab Test  02/11/18   0658  02/06/18   0612  02/05/18   0629  02/03/18   0613   WBC  6.4  8.3   --   8.4   HGB  10.0*  10.1*   --   9.5*   PLT  203  192  213  252       LFTs -   Recent Labs   Lab Test  02/02/18   1736  01/26/18   1446  01/07/18   1655  11/13/17   1709   ALKPHOS  86  95  76  104   BILITOTAL  0.4  0.4  0.4  0.8   ALT  16  16  24  26   AST  20  23  32  22   PROTTOTAL  6.2*   --   6.0*  6.0*   ALBUMIN  2.1*   --   1.8*  2.3*       Iron Panel -   Recent Labs   Lab Test  07/19/17   1306  07/05/17   1204  06/21/17   1058   IRON  46  26*  69   IRONSAT  18  12*  29   ALBERTINA  369  542*  557*         Imaging:  All imaging studies reviewed by me.     Current Medications:    sodium chloride 0.9%  250 mL Intravenous Once in dialysis     sodium chloride 0.9%  300 mL Hemodialysis Machine Once     gelatin absorbable  1 each Topical During Hemodialysis (from stock)     epoetin emily (EPOGEN,PROCRIT) inj ESRD  2,000 Units Intravenous Once in dialysis     doxercalciferol  1  mcg Intravenous Once in dialysis     - MEDICATION INSTRUCTIONS -   Does not apply Once     sodium chloride (PF)  3 mL Intracatheter During Hemodialysis (from stock)     sodium chloride (PF)  3 mL Intracatheter During Hemodialysis (from stock)     heparin  3 mL Intracatheter During Hemodialysis (from stock)     heparin  3 mL Intracatheter During Hemodialysis (from stock)     isosorbide dinitrate  10 mg Oral TID     hydrALAZINE  25 mg Oral TID     sodium chloride (PF)  3 mL Intracatheter Q8H     insulin aspart  1-7 Units Subcutaneous TID AC     insulin aspart  1-5 Units Subcutaneous At Bedtime     allopurinol  400 mg Oral Daily     heparin  5,000 Units Subcutaneous Q12H     aspirin  81 mg Oral Daily     atorvastatin  40 mg Oral Daily     calcium acetate  667 mg Oral TID w/meals     omeprazole  20 mg Oral At Bedtime     predniSONE  5 mg Oral Daily       Kristi Armas PA-C

## 2018-02-13 NOTE — PLAN OF CARE
Problem: Patient Care Overview  Goal: Plan of Care/Patient Progress Review  Pt is AxOx4. VSS. Tachycardic at baseline. BP occasionally soft. Blood sugars are 241 and 175. Fluid restriction continues at 2000cc/day. Medium BM today. Pt reported. Voiding independently. No pain reported. We will continue to monitor him.

## 2018-02-13 NOTE — PLAN OF CARE
Problem: Patient Care Overview  Goal: Plan of Care/Patient Progress Review  Outcome: No Change  Pt is alert, oriented, and able to make his needs known.   Denied pain and discomfort. Ambulated in his room and the halls independently.   LS clear but diminished in lower lobes.  No edema noted. VSS.   Will continue to assess fluid status, labs, and VS. Changes and concerns will be reported to provider.

## 2018-02-13 NOTE — PROGRESS NOTES
Cardiology Progress Note     ASSESSMENT/PLAN:  64 y/o man with non-obstructive CAD, ICM with HFrEF (10-15%), functionally bicuspid AV with ascending aortic aneurysm, ESRD on HD (TTS), DM2, presented with MEADE/orthopnea, found to have worsened EF (10-15%), underwent dialysis with improvement of PCWP (45 --> 15).     Acute on chronic HFrEF (EF 10-15%, last echo 2/2)  Etiology likely hypertensive cardiomyopathy. RHC on 2/3 with elevated LV and RV pressures (56/18, PCWP ~40). Cause of decompensation likely due to inadequate removal of fluid with HD due to hypotension. Repeat RHC on 2/8 with improved right and left pressures. EDW ~162 lb.  - BB: holding due to hypotension  - ACEI: holding due to hypotension  - Afterload: hydralazine 25 mg TID, imdur 10 mg TID  - Aldosterone antagonist: ESRD  - CAD: ASA 81 mg, statin 40 mg  - F/u in core cardiology clinic in 1 week  - F/u with Dr Ernst for heart-kidney transplant work-up in 2-3 wk  - Watch overnight, if BPs ok and no concerning sx, d/c in AM     ESRD on HD (TTS)  Previous consideration for kidney tx, but w/u not completed. On peritoneal dialysis until 1/2018 after developing peritonitis.  - nephrology following  - Continue Epogen 4000 units Q3 weeks, started 2/6 per renal  - may be candidate for heart-renal transplant     Bicuspid aortic valve  Ascending aortic aneurysm  Most recent echo 2/2/2018. Moderate AI. Last cardiac MRI 2/2014 with aortic root 4.5 x 4.5 cm and prox ascending aorta 4.1 x 4.3 cm.   - Routine monitoring through cardiology clinic     DM2  A1c 8.6% (2/2/2018).  - hold home glipizide  - medium ISS     H/o Gout  - pred 5 mg Qday  - allopurinol 400 mg Qday     Code status: Full  Diet/Nutrition: cardiac diet, fluid restrict  DVT prophy: heparin SQ  Discharge plan: tomorrow AM     Pt seen and discussed with Dr Ernst.     Jeison Smith MD, MPH  Internal Medicine,  PGY-2  [phone]    --------------------------------------------------------------------------------------    Interval Hx: No acute events overnight. Tolerated imdur yesterday.     Temp: 98.2  F (36.8  C) Temp  Min: 97.7  F (36.5  C)  Max: 98.6  F (37  C)  Resp: 17 Resp  Min: 16  Max: 18  SpO2: 100 % SpO2  Min: 97 %  Max: 100 %  Heart Rate: 117 Heart Rate  Min: 113  Max: 124  BP: 111/70 Systolic (24hrs), Av , Min:89 , Max:123   Diastolic (24hrs), Av, Min:58, Max:81    Phys exam:  GEN: NAD  Pulm: CTAB  Cardiac: JVP non-elevated, no murmurs   GI: soft, non distended  Neuro: CN 2-12 grossly intact    Radiology Imaging  reviewed    Cardiology Studies:  reviewed    Labs: Reviewed in epic

## 2018-02-14 ENCOUNTER — APPOINTMENT (OUTPATIENT)
Dept: OCCUPATIONAL THERAPY | Facility: CLINIC | Age: 63
DRG: 286 | End: 2018-02-14
Payer: COMMERCIAL

## 2018-02-14 VITALS
SYSTOLIC BLOOD PRESSURE: 111 MMHG | TEMPERATURE: 98.5 F | WEIGHT: 163.7 LBS | OXYGEN SATURATION: 100 % | BODY MASS INDEX: 24.24 KG/M2 | RESPIRATION RATE: 16 BRPM | DIASTOLIC BLOOD PRESSURE: 69 MMHG | HEIGHT: 69 IN | HEART RATE: 114 BPM

## 2018-02-14 LAB
ANION GAP SERPL CALCULATED.3IONS-SCNC: 9 MMOL/L (ref 3–14)
BUN SERPL-MCNC: 32 MG/DL (ref 7–30)
CALCIUM SERPL-MCNC: 8.9 MG/DL (ref 8.5–10.1)
CHLORIDE SERPL-SCNC: 100 MMOL/L (ref 94–109)
CO2 SERPL-SCNC: 28 MMOL/L (ref 20–32)
CREAT SERPL-MCNC: 5.98 MG/DL (ref 0.66–1.25)
ERYTHROCYTE [DISTWIDTH] IN BLOOD BY AUTOMATED COUNT: 18.9 % (ref 10–15)
GFR SERPL CREATININE-BSD FRML MDRD: 10 ML/MIN/1.7M2
GLUCOSE BLDC GLUCOMTR-MCNC: 130 MG/DL (ref 70–99)
GLUCOSE BLDC GLUCOMTR-MCNC: 208 MG/DL (ref 70–99)
GLUCOSE SERPL-MCNC: 160 MG/DL (ref 70–99)
HCT VFR BLD AUTO: 32 % (ref 40–53)
HGB BLD-MCNC: 10 G/DL (ref 13.3–17.7)
MCH RBC QN AUTO: 31.1 PG (ref 26.5–33)
MCHC RBC AUTO-ENTMCNC: 31.3 G/DL (ref 31.5–36.5)
MCV RBC AUTO: 99 FL (ref 78–100)
PLATELET # BLD AUTO: 261 10E9/L (ref 150–450)
PLATELET # BLD AUTO: 261 10E9/L (ref 150–450)
POTASSIUM SERPL-SCNC: 4.6 MMOL/L (ref 3.4–5.3)
RBC # BLD AUTO: 3.22 10E12/L (ref 4.4–5.9)
SODIUM SERPL-SCNC: 137 MMOL/L (ref 133–144)
WBC # BLD AUTO: 7.1 10E9/L (ref 4–11)

## 2018-02-14 PROCEDURE — 25000128 H RX IP 250 OP 636: Performed by: INTERNAL MEDICINE

## 2018-02-14 PROCEDURE — 80048 BASIC METABOLIC PNL TOTAL CA: CPT | Performed by: INTERNAL MEDICINE

## 2018-02-14 PROCEDURE — 36415 COLL VENOUS BLD VENIPUNCTURE: CPT | Performed by: INTERNAL MEDICINE

## 2018-02-14 PROCEDURE — 36415 COLL VENOUS BLD VENIPUNCTURE: CPT | Performed by: EMERGENCY MEDICINE

## 2018-02-14 PROCEDURE — 97535 SELF CARE MNGMENT TRAINING: CPT | Mod: GO

## 2018-02-14 PROCEDURE — 25000132 ZZH RX MED GY IP 250 OP 250 PS 637: Performed by: INTERNAL MEDICINE

## 2018-02-14 PROCEDURE — 85027 COMPLETE CBC AUTOMATED: CPT | Performed by: INTERNAL MEDICINE

## 2018-02-14 PROCEDURE — 97110 THERAPEUTIC EXERCISES: CPT | Mod: GO

## 2018-02-14 PROCEDURE — 85049 AUTOMATED PLATELET COUNT: CPT | Performed by: EMERGENCY MEDICINE

## 2018-02-14 PROCEDURE — 40000133 ZZH STATISTIC OT WARD VISIT

## 2018-02-14 PROCEDURE — 00000146 ZZHCL STATISTIC GLUCOSE BY METER IP

## 2018-02-14 PROCEDURE — 99238 HOSP IP/OBS DSCHRG MGMT 30/<: CPT | Mod: GC | Performed by: INTERNAL MEDICINE

## 2018-02-14 PROCEDURE — 25000125 ZZHC RX 250: Performed by: INTERNAL MEDICINE

## 2018-02-14 PROCEDURE — 25000132 ZZH RX MED GY IP 250 OP 250 PS 637: Performed by: STUDENT IN AN ORGANIZED HEALTH CARE EDUCATION/TRAINING PROGRAM

## 2018-02-14 RX ADMIN — PREDNISONE 5 MG: 5 TABLET ORAL at 08:52

## 2018-02-14 RX ADMIN — CALCIUM ACETATE 667 MG: 667 CAPSULE ORAL at 13:07

## 2018-02-14 RX ADMIN — ASPIRIN 81 MG CHEWABLE TABLET 81 MG: 81 TABLET CHEWABLE at 08:51

## 2018-02-14 RX ADMIN — HEPARIN SODIUM 5000 UNITS: 5000 INJECTION, SOLUTION INTRAVENOUS; SUBCUTANEOUS at 08:52

## 2018-02-14 RX ADMIN — ATORVASTATIN CALCIUM 40 MG: 40 TABLET, FILM COATED ORAL at 08:51

## 2018-02-14 RX ADMIN — ALLOPURINOL 400 MG: 300 TABLET ORAL at 08:51

## 2018-02-14 RX ADMIN — CALCIUM ACETATE 667 MG: 667 CAPSULE ORAL at 08:51

## 2018-02-14 RX ADMIN — LOSARTAN POTASSIUM 25 MG: 25 TABLET, FILM COATED ORAL at 08:52

## 2018-02-14 ASSESSMENT — PAIN DESCRIPTION - DESCRIPTORS: DESCRIPTORS: ACHING

## 2018-02-14 NOTE — PLAN OF CARE
Problem: Patient Care Overview  Goal: Plan of Care/Patient Progress Review  Outcome: Improving  Pt admitted 2/2 with MEADE/orthopnea, EF 10-15%, s/p dialysis and improved PCWP.  Yesterday's dialysis didn't take much off r/t low BP.  Plan is to do pre heart/kidney txp w/u out-pt.  D/C a few BP med's yesterday and will start losartan today.  Refused 200 am glucose.  Otherwise, pt slept well and up independently.  Continue to monitor and with POC.

## 2018-02-14 NOTE — PLAN OF CARE
Problem: Patient Care Overview  Goal: Plan of Care/Patient Progress Review  Discharge Planner OT   Patient plan for discharge: Home with OP CR Phase II  Current status: Independent with bed mobility, full body dressing, sit <> stand, and ambulation without AD. Pt completed ~675 ft with x 2 seated rest breaks, no LOB. Pt ascended/descended x18 stairs with unilateral railing, educated pt on proper body mechanics and energy conservation strategies, pt verbalized understanding. Pre activity vitals: BP 87/57 (70), -113. Post activity vitals: BP 95/60 (69), -134, SpO2 100% on room air.  Barriers to return to prior living situation: decreased strength/activity tolerance  Recommendations for discharge: Home with OP CR Phase II  Rationale for recommendations: Pt would benefit from continued OP CR to maximize independence in ADLs/IADLs.       Entered by: Edith Bloom 02/14/2018 2:30 PM

## 2018-02-14 NOTE — PROGRESS NOTES
Care Coordinator- Discharge Planning     Admission Date/Time:  2/2/2018  Attending MD:  Deadnre Muñoz MD     Data  Date of initial CC assessment: 2/5/2018  Chart reviewed, discussed with interdisciplinary team.   Patient was admitted for:   1. MEADE (dyspnea on exertion)    2. Acute on chronic systolic heart failure (H)    3. Familial cardiomyopathy (H)    4. Chronic systolic heart failure (H)    5. Ascending aortic aneurysm (H)    6. Coronary artery disease involving native coronary artery of native heart without angina pectoris    7. Hyperlipidemia LDL goal <100    8. Bicuspid aortic valve      Concerns with insurance coverage for discharge needs:  to discuss FMLA form.   Current Living Situation: Patient lives alone. Pt said that he is independent with his own care.   Support System: Supportive and Involved sons.   Services Involved: [facility]  Transportation: Family or Friend will provide  Barriers to Discharge: medical condition.      Coordination of Care and Referrals: Provided patient/family with options for resumption of outpt dialysis.       Assessment  Pt said that he is already set up for outpt dialysis and will resume their services. Per MD, pt medically stable for discharge today and will need CORE clinic follow up in one week, will send message to [facility] scheduling. Updated clinicals and dc orders faxed to [facility].  Intervention:   Arrangements made with [facility] (Ph: [phone] Fax: [phone]) for resumption of outpt dialysis on T-Th-Sat at 6:15 AM.    Plan  Anticipated Discharge Date: 2/14/2018  Anticipated Discharge Plan: Discharge to home with OP CR    CTS Handoff completed:  YES  Kavitha Bates RN BSN  6C Unit Care Coordinator  Phone number: [phone]  Pager: [phone]

## 2018-02-14 NOTE — PLAN OF CARE
Problem: Patient Care Overview  Goal: Plan of Care/Patient Progress Review  Outcome: No Change  Pt is alert, oriented, and able to make his needs known.   Denied pain but reported increased weakness and dizziness after dialysis. Has to stop and rest between cares. When pt attempted to stand with OT, he felt dizzy. BPs were checked and found to be low. MD was informed.   , 264, and 270. Insulin was administered as ordered.   Heart rhythm -130 w/ bundle branch block and occ PVCs and PACs.   Will continue to assess labs, heart rhythm, and VS. Changes and concerns will be reported to provider.   Possible DC to home tomorrow.

## 2018-02-15 ENCOUNTER — TELEPHONE (OUTPATIENT)
Dept: FAMILY MEDICINE | Facility: CLINIC | Age: 63
End: 2018-02-15

## 2018-02-15 ENCOUNTER — PRE VISIT (OUTPATIENT)
Dept: CARDIOLOGY | Facility: CLINIC | Age: 63
End: 2018-02-15

## 2018-02-15 ENCOUNTER — CARE COORDINATION (OUTPATIENT)
Dept: CARE COORDINATION | Facility: CLINIC | Age: 63
End: 2018-02-15

## 2018-02-15 DIAGNOSIS — I95.3 HEMODIALYSIS-ASSOCIATED HYPOTENSION: Primary | ICD-10-CM

## 2018-02-15 DIAGNOSIS — I10 HYPERTENSION GOAL BP (BLOOD PRESSURE) < 130/80: ICD-10-CM

## 2018-02-15 DIAGNOSIS — N18.5 CKD (CHRONIC KIDNEY DISEASE) STAGE 5, GFR LESS THAN 15 ML/MIN (H): ICD-10-CM

## 2018-02-15 DIAGNOSIS — E11.22 TYPE 2 DIABETES MELLITUS WITH DIABETIC CHRONIC KIDNEY DISEASE (H): ICD-10-CM

## 2018-02-15 DIAGNOSIS — D63.8 ANEMIA OF CHRONIC DISORDER: ICD-10-CM

## 2018-02-15 DIAGNOSIS — I50.22 CHRONIC SYSTOLIC HEART FAILURE (H): Primary | ICD-10-CM

## 2018-02-15 DIAGNOSIS — I50.9 CHF (CONGESTIVE HEART FAILURE) (H): Primary | ICD-10-CM

## 2018-02-15 DIAGNOSIS — D36.9 TUBULAR ADENOMA: ICD-10-CM

## 2018-02-15 DIAGNOSIS — I11.9: ICD-10-CM

## 2018-02-15 RX ORDER — MIDODRINE HYDROCHLORIDE 10 MG/1
1 TABLET ORAL
Qty: 90 TABLET | Refills: 11 | Status: ON HOLD | OUTPATIENT
Start: 2018-02-16 | End: 2018-07-02

## 2018-02-15 NOTE — TELEPHONE ENCOUNTER
Referral needed for 5 year recheck colonoscopy due to polyps.  During hospital f/u call pt states to nurse that he was informed that he had been taken off the transplant list due to not being up to date with his colonoscopy and also needing a dental teeth check.(has [facility] and will call them to get a dentist)    Nurse reviewed chart and sessile polyp removed in 2011 and was to come back in 5 years.    Patient aware that nurse to send the Referral for colonoscopy to him via my chart.  He said he will go ahead and set up appt for a colonoscopy.   Nieves Cash RN

## 2018-02-15 NOTE — TELEPHONE ENCOUNTER
"Hospital/TCU/ED for chronic condition Discharge Protocol    \"Hi, my name is Nieves NAJMA Shailesh, a registered nurse, and I am calling from [facility].  I am calling to follow up and see how things are going for you after your recent emergency visit/hospital/TCU stay.\"    Tell me how you are doing now that you are home?\" At dialysis right now but I can talk. Let me check my appt book.       Discharge Instructions    \"Let's review your discharge instructions.  What is/are the follow-up recommendations?  Pt. Response: reviewed briefly if he had any questions.     \"Has an appointment with your primary care provider been scheduled?\"   No (schedule appointment)    \"When you see the provider, I would recommend that you bring your medications with you.\"    Medications    \"Tell me what changed about your medicines when you discharged?\"    Changes to chronic meds?    HAS VISIT WITH HIS [facility]ORE CLINIC LINED UP.     \"What questions do you have about your medications?\"    None     New diagnoses of heart failure, COPD, diabetes, or MI?    No              Medication reconciliation completed? Yes  Was MTM referral placed (*Make sure to put transitions as reason for referral)?   No    Call Summary    \"What questions or concerns do you have about your recent visit and your follow-up care?\"     needs referral for colonoscopy.. Advice given: . he was taken off transplant list due to needing colonoscopy and dental check.  Referral will be placed by nurse for colonoscopy in a separate message.  He has [facility] and will call to set up appt with a dentist.   \"If you have questions or things don't continue to improve, we encourage you contact us through the main clinic number (give number).  Even if the clinic is not open, triage nurses are available 24/7 to help you.     We would like you to know that our clinic has extended hours (provide information).  We also have urgent care (provide details on closest location and hours/contact " "info)\"      \"Thank you for your time and take care!\"             "

## 2018-02-15 NOTE — DISCHARGE SUMMARY
Dialysis Discharge Summary Brief    [facility]  Division of Nephrology  Nephrology Discharge Dialysis Orders  Ph: [phone]  Fax: [phone]    Murray Nicholson  MRN: [id number]  YOB: 1955    [facility] Dialysis Unit: [place]  Primary Nephrologist: /Lauren Anderson NP    Date of Admission: 2/2/2018  Date of Discharge: 2/14/2018    Please see cardiology discharge summary faxed to you for ross detail. Last dialysis run was Tuesday 2/13. Please page me at  with any questions. Thanks much. Kristi Armas PA-C    Murray Nicholson is a 63 yr old male with PMH of HTN, DMII, functionally bicuspid aortic valve, CHF/CM (newly reduced to EF 10-15%), ESRD, admitted with SOB and worsening heart failure.     ESKD: due to DMII, on PD since Aug 2017 (through [facility]), changed to IHD 1/2018 due to polymicrobial and fungal peritonitis, now schedule TTS, at [facility] under care of Dr Mcpherson, via tunneled RIJ, Run time: 3hrs, EDW 74 kg, challenged this admission.  - Dialysis per TTS schedule   - Heparin lock CVC  - Per Dr. Mcpherson, can consider trying PD again and will talk to patient more in outpatient setting  - Is also being discussed for possible heart-kidney transplant  - May need longer run time if unable to maintain EDW     Volume: 74 kg, down from 80 kg with significant improvement in PCW (from 37 to 13).  - Hypotension will limit his ability for volume removal as an outpt    - Continue to stress the importance of strict fluid restriction (recommend fluid and Na restriction while here)      BP/congestive CM (EF 10-15%); minimal CAD per angio on 2/8/17. BP's currently in 105-110's with ongoing tachycardia in the 115-120's  - Goal MAP > 65 and SBP > 95 while dialyzing  - BP meds per cardiology in setting of HF (only on losartan 25 mg qday; BB stopped and pt unable to tolerate afterload reduction due to hypotension)      Anemia: hgb 10.0 (2/14).        BMD: calcium 8.9, alb 2.1, phos 4.1; on Hectorol 1 mcg via HD; on phoslo 1 tab TID with meals.   - Continue hectorol via HD  - Continue Phoslo        [x] Resume all previous dialysis orders with exception as noted below    New Orders (if not applicable put NA):  Estimated Dry Weight 74 kg   Dialysis Duration May need longer run time if unable to maintain new EDW   Dialysis Access    Antibiotics (dose per dialysis, end date)            Labs to be drawn at dialysis    Other major changes to dialysis prescription (e.g. Dialysate bath, heparin, blood flow rate, etc)   Recommend heparin lock CVC   Medication changes (also fax the unit a copy of the discharge summary)         BP meds: losartan 25 mg qday  Restart ANASTASIYA/venofer per your protocol     Name of provider completing this form: Kristi Armas PA-C

## 2018-02-15 NOTE — PLAN OF CARE
Problem: Patient Care Overview  Goal: Plan of Care/Patient Progress Review  Occupational Therapy Discharge Summary    Reason for therapy discharge:    Discharged to home with outpatient therapy.    Progress towards therapy goal(s). [facility] for goal details.  Goals partially met.  Barriers to achieving goals:   discharge from facility.    Therapy recommendation(s):    Continued therapy is recommended.  Rationale/Recommendations:  OP phase II CR to maximize ADL I.

## 2018-02-19 ENCOUNTER — MYC MEDICAL ADVICE (OUTPATIENT)
Dept: NEPHROLOGY | Facility: CLINIC | Age: 63
End: 2018-02-19

## 2018-02-19 DIAGNOSIS — M1A.0390 CHRONIC GOUT OF WRIST, UNSPECIFIED CAUSE, UNSPECIFIED LATERALITY: ICD-10-CM

## 2018-02-19 DIAGNOSIS — M10.9 GOUT, UNSPECIFIED CAUSE, UNSPECIFIED CHRONICITY, UNSPECIFIED SITE: ICD-10-CM

## 2018-02-19 LAB
BASOPHILS # BLD AUTO: 0.1 10E9/L (ref 0–0.2)
BASOPHILS NFR BLD AUTO: 0.5 %
DIFFERENTIAL METHOD BLD: ABNORMAL
EOSINOPHIL # BLD AUTO: 0.2 10E9/L (ref 0–0.7)
EOSINOPHIL NFR BLD AUTO: 1.7 %
ERYTHROCYTE [DISTWIDTH] IN BLOOD BY AUTOMATED COUNT: 17.4 % (ref 10–15)
HCT VFR BLD AUTO: 30.6 % (ref 40–53)
HGB BLD-MCNC: 9.8 G/DL (ref 13.3–17.7)
LYMPHOCYTES # BLD AUTO: 0.6 10E9/L (ref 0.8–5.3)
LYMPHOCYTES NFR BLD AUTO: 6.4 %
MCH RBC QN AUTO: 32.6 PG (ref 26.5–33)
MCHC RBC AUTO-ENTMCNC: 32 G/DL (ref 31.5–36.5)
MCV RBC AUTO: 102 FL (ref 78–100)
MONOCYTES # BLD AUTO: 0.6 10E9/L (ref 0–1.3)
MONOCYTES NFR BLD AUTO: 6 %
NEUTROPHILS # BLD AUTO: 8 10E9/L (ref 1.6–8.3)
NEUTROPHILS NFR BLD AUTO: 85.4 %
PLATELET # BLD AUTO: 311 10E9/L (ref 150–450)
RBC # BLD AUTO: 3.01 10E12/L (ref 4.4–5.9)
WBC # BLD AUTO: 9.4 10E9/L (ref 4–11)

## 2018-02-19 PROCEDURE — 36415 COLL VENOUS BLD VENIPUNCTURE: CPT | Performed by: FAMILY MEDICINE

## 2018-02-19 PROCEDURE — 85025 COMPLETE CBC W/AUTO DIFF WBC: CPT | Performed by: FAMILY MEDICINE

## 2018-02-19 PROCEDURE — 80053 COMPREHEN METABOLIC PANEL: CPT | Performed by: FAMILY MEDICINE

## 2018-02-19 NOTE — TELEPHONE ENCOUNTER
Dr. Turcios review:  Triage spoke with Dr. Mcpherson.  She did advise pt to f/u with Dr Turcios for the symptoms. Read her note to pt below.    A suggestion was to watch white count.  Patient was called and he agrees to get labs.(already needed to get labs for Dr. Tang for upcoming 3/16 visit)    He worries about recurrence of the peritonitis.  He used to have the PD cath in which gave him some sx to watch for. He does not have this in now.      He reports his pain as a 2. Notes a change in his stool shape and frequency (having 4-5 today but not diarrhea he says) shape of stool is narrower.    He does have appt with Dr. Turcios on Friday.  He will come in today for labs.  Nieves Mcpherson is accessible and willing to advise on cares if needed.  She states pt is now on the transplant workup list for both heart and kidney.

## 2018-02-20 ENCOUNTER — TELEPHONE (OUTPATIENT)
Dept: RHEUMATOLOGY | Facility: CLINIC | Age: 63
End: 2018-02-20

## 2018-02-20 LAB
ALBUMIN SERPL-MCNC: 2.7 G/DL (ref 3.4–5)
ALP SERPL-CCNC: 102 U/L (ref 40–150)
ALT SERPL W P-5'-P-CCNC: 15 U/L (ref 0–70)
ANION GAP SERPL CALCULATED.3IONS-SCNC: 11 MMOL/L (ref 3–14)
AST SERPL W P-5'-P-CCNC: 18 U/L (ref 0–45)
BILIRUB SERPL-MCNC: 0.6 MG/DL (ref 0.2–1.3)
BUN SERPL-MCNC: 56 MG/DL (ref 7–30)
CALCIUM SERPL-MCNC: 9.5 MG/DL (ref 8.5–10.1)
CHLORIDE SERPL-SCNC: 101 MMOL/L (ref 94–109)
CO2 SERPL-SCNC: 22 MMOL/L (ref 20–32)
CREAT SERPL-MCNC: 7.84 MG/DL (ref 0.66–1.25)
GFR SERPL CREATININE-BSD FRML MDRD: 7 ML/MIN/1.7M2
GLUCOSE SERPL-MCNC: 156 MG/DL (ref 70–99)
POTASSIUM SERPL-SCNC: 5 MMOL/L (ref 3.4–5.3)
PROT SERPL-MCNC: 7.2 G/DL (ref 6.8–8.8)
SODIUM SERPL-SCNC: 134 MMOL/L (ref 133–144)

## 2018-02-20 NOTE — PLAN OF CARE
Problem: Patient Care Overview  Goal: Plan of Care/Patient Progress Review  Discharge Planner OT   Patient plan for discharge: Home with A and OP CR   Current status: Pt required SBA and vc's for ambulation from bathroom to EOB. Pt very fatigued from shower task. Therapist educated pt on EC/WS and fatigue management. Pt dizzy with changes in position. RN notified, positive orthostatics and addressed by RN.   Barriers to return to prior living situation: Decreased activity tolerance, Fatigue.   Recommendations for discharge: Home with A and OP Phase II CR   Rationale for recommendations: Pt will benefit from continued therapy for OP CR.

## 2018-02-20 NOTE — TELEPHONE ENCOUNTER
Time of Call: 9:31 AM    Person reporting results: Leia Knight/facility results are coming from: [facility]    Phone number:     CRITICAL RESULTS: Creatinine 7.84     Results taken by: PRADIP Gtz RN               Diagnosis: Gout/Medication monitoring labs     Ordering provider: Dr Tang     Course of Action: No action needed, pt is on dialysis. Informed Nephrology nurses who are in the process of notifying Dr Mcpherson.    PRADIP Gtz RN  Rheumatology RN Coordinator  [facility]

## 2018-02-21 ENCOUNTER — OFFICE VISIT (OUTPATIENT)
Dept: CARDIOLOGY | Facility: CLINIC | Age: 63
End: 2018-02-21
Attending: NURSE PRACTITIONER
Payer: COMMERCIAL

## 2018-02-21 VITALS
BODY MASS INDEX: 25.04 KG/M2 | OXYGEN SATURATION: 100 % | WEIGHT: 169.1 LBS | HEART RATE: 116 BPM | HEIGHT: 69 IN | SYSTOLIC BLOOD PRESSURE: 87 MMHG | DIASTOLIC BLOOD PRESSURE: 54 MMHG

## 2018-02-21 DIAGNOSIS — I71.20 THORACIC AORTIC ANEURYSM WITHOUT RUPTURE (H): ICD-10-CM

## 2018-02-21 DIAGNOSIS — R00.0 TACHYCARDIA: ICD-10-CM

## 2018-02-21 DIAGNOSIS — I50.20 SYSTOLIC HEART FAILURE, UNSPECIFIED HEART FAILURE CHRONICITY: Primary | ICD-10-CM

## 2018-02-21 DIAGNOSIS — N18.5 CKD (CHRONIC KIDNEY DISEASE) STAGE 5, GFR LESS THAN 15 ML/MIN (H): ICD-10-CM

## 2018-02-21 DIAGNOSIS — I50.22 CHRONIC SYSTOLIC HEART FAILURE (H): ICD-10-CM

## 2018-02-21 DIAGNOSIS — I95.2 HYPOTENSION DUE TO DRUGS: ICD-10-CM

## 2018-02-21 LAB
ANION GAP SERPL CALCULATED.3IONS-SCNC: 10 MMOL/L (ref 3–14)
BUN SERPL-MCNC: 32 MG/DL (ref 7–30)
CALCIUM SERPL-MCNC: 9.2 MG/DL (ref 8.5–10.1)
CHLORIDE SERPL-SCNC: 102 MMOL/L (ref 94–109)
CO2 SERPL-SCNC: 24 MMOL/L (ref 20–32)
CREAT SERPL-MCNC: 5.79 MG/DL (ref 0.66–1.25)
GFR SERPL CREATININE-BSD FRML MDRD: 10 ML/MIN/1.7M2
GLUCOSE SERPL-MCNC: 90 MG/DL (ref 70–99)
POTASSIUM SERPL-SCNC: 4 MMOL/L (ref 3.4–5.3)
SODIUM SERPL-SCNC: 136 MMOL/L (ref 133–144)
TSH SERPL DL<=0.005 MIU/L-ACNC: 3.59 MU/L (ref 0.4–4)

## 2018-02-21 PROCEDURE — 36415 COLL VENOUS BLD VENIPUNCTURE: CPT | Performed by: NURSE PRACTITIONER

## 2018-02-21 PROCEDURE — 84443 ASSAY THYROID STIM HORMONE: CPT | Performed by: NURSE PRACTITIONER

## 2018-02-21 PROCEDURE — 80048 BASIC METABOLIC PNL TOTAL CA: CPT | Performed by: NURSE PRACTITIONER

## 2018-02-21 PROCEDURE — 99214 OFFICE O/P EST MOD 30 MIN: CPT | Mod: ZP | Performed by: NURSE PRACTITIONER

## 2018-02-21 PROCEDURE — G0463 HOSPITAL OUTPT CLINIC VISIT: HCPCS | Mod: ZF

## 2018-02-21 RX ORDER — LOSARTAN POTASSIUM 25 MG/1
TABLET ORAL
Qty: 30 TABLET | Refills: 3 | Status: SHIPPED | OUTPATIENT
Start: 2018-02-21 | End: 2018-04-04

## 2018-02-21 ASSESSMENT — PAIN SCALES - GENERAL: PAINLEVEL: NO PAIN (0)

## 2018-02-21 NOTE — MR AVS SNAPSHOT
After Visit Summary   2/21/2018    Sanchez Mcneil    MRN: [id number]           Patient Information     Date Of Birth          1955        Visit Information        Provider Department      2/21/2018 8:30 AM Kristi Ayon NP [facility]        Today's Diagnoses     Systolic heart failure, unspecified heart failure chronicity (H)    -  1    Tachycardia          Care Instructions    Preventive Care:    Colorectal Cancer Screening: During our visit today, we discussed that it is recommended you receive colorectal cancer screening. Please call or make an appointment with your primary care provider to discuss this. You may also call the [facility] scheduling line ([phone]) to set up a colonoscopy appointment.    You were seen today in the Cardiovascular Clinic at the [facility].     Cardiology Providers you saw during your visit: Kristi Ayon NP     1. Decrease Losartan to 12. 5 mg daily. Check Blood Pressures daily, if SBP (top number) is less then 100, hold Losartan. Write down blood pressures over the weekend and send us a message on Monday with them.   2. Follow up in [facility] with Ramya Suh on March 7th at 8:00 with labs prior.   3. No colonoscopy for now until BPs come up.   4. Follow up with Dr Fontenot next available appointment.      Results for SANCHEZ MCNEIL (MRN [id number]) as of 2/21/2018 09:32   Ref. Range 2/21/2018 09:00   Sodium Latest Ref Range: 133 - 144 mmol/L 136   Potassium Latest Ref Range: 3.4 - 5.3 mmol/L 4.0   Chloride Latest Ref Range: 94 - 109 mmol/L 102   Carbon Dioxide Latest Ref Range: 20 - 32 mmol/L 24   Urea Nitrogen Latest Ref Range: 7 - 30 mg/dL 32 (H)   Creatinine Latest Ref Range: 0.66 - 1.25 mg/dL 5.79 (H)   GFR Estimate Latest Ref Range: >60 mL/min/1.7m2 10 (L)   GFR Estimate If Black Latest Ref Range: >60 mL/min/1.7m2 12 (L)   Calcium Latest Ref Range: 8.5 - 10.1 mg/dL 9.2   Anion Gap Latest Ref Range: 3 - 14 mmol/L 10  "        Please limit your fluid intake to 2 L (64 ounces) daily.  2 Liters a day = 8.5 cups, or 72 ounces.  Please limit your salt intake to 2 grams a day or less.     If you gain 2# in 24 hours or 5# in one week call Matilda Morales RN so we can adjust your medications as needed over the phone.     Please feel free to call me with any questions or concerns.       Matilda Morales RN  [facility]  Cardiology Care Coordinator-Heart Failure Clinic     Questions and [phone].   First press #1 for the University and then press #3 for \"Medical Questions\" to reach us Cardiology Nurses.      On Call Cardiologist for after hours or on weekends: [phone]   option #4 and ask to speak to the on-call Cardiologist. Inform them you are a CORE/heart failure patient at the [facility].           If you need a medication refill please contact your pharmacy.  Please allow 3 business days for your refill to be completed.  _______________________________________________________  [facility].R[facility] CLINIC Cardiomyopathy, Optimization, Rehabilitation, Education   The C.O.R.E. CLINIC is a heart failure specialty clinic within the [facility] where you will work with specialized nurse practitioners dedicated to helping patients with heart failure carefully adjust medications, receive education, and learn who and when to call if symptoms develop. They specialize in helping you better understand your condition, slow the progression of your disease, improve the length and quality of your life, help you detect future heart problems before they become life threatening, and avoid hospitalizations.  As always, thank you for trusting us with your health care needs!               Follow-ups after your visit        Additional Services     Follow-Up with Advanced Heart Failure Cardiologist                 Your next 10 appointments already scheduled     2018 11:40 AM CST "   Office Visit with Yahir Turcios MD   [facility] ([facility])    [address] [facility] [id number] [phone]           Bring a current list of meds and any records pertaining to this visit. For Physicals, please bring immunization records and any forms needing to be filled out. Please arrive 10 minutes early to complete paperwork.            Mar 07, 2018  7:45 AM CST   Lab with  LAB   [facility] ([facility])    [address] [place] [address]  1st Floor  [facility] [address]   [phone]            Mar 07, 2018  8:00 AM CST   (Arrive by 7:45 AM)   CORE RETURN with VERONICA Rocha CNP   [facility] ([facility])    [address] [place] [address]  [address]  [facility] [address]   [phone]            Mar 12, 2018  1:00 PM CDT   (Arrive by 12:45 PM)   NEW HEART FAILURE with Klever Ernst MD   [facility] ([facility])    [address] [place] [address]  [address]  [facility] [address]   [phone]            Mar 16, 2018  9:30 AM CDT   (Arrive by 9:15 AM)   Return Visit with Darvin Tang MD   [facility] ([facility])    [address] [place] [address]  [address]  [facility] [address]   [phone]            Mar 28, 2018 10:30 AM CDT   Cardiac Evaluation with  Cardiac Rehab 1   [facility] [place] Card[facility] ([facility])    [facility] [address] 1st [address]  [facility] [address] [phone]              Future tests that were ordered for you today     Open Future Orders        Priority Expected Expires Ordered    Follow-Up with Advanced Heart Failure Cardiologist Routine 3/1/2018 5/31/2018 2/21/2018            Who to contact     If you have questions or need follow up information about today's clinic visit or your schedule please contact [facility]  "directly at [phone].  Normal or non-critical lab and imaging results will be communicated to you by [employer], letter or phone within 4 business days after the clinic has received the results. If you do not hear from us within 7 days, please contact the clinic through [employer] or phone. If you have a critical or abnormal lab result, we will notify you by phone as soon as possible.  Submit refill requests through [employer] or call your pharmacy and they will forward the refill request to us. Please allow 3 business days for your refill to be completed.          Additional Information About Your Visit        [employer] Information     [employer] gives you secure access to your electronic health record. If you see a primary care provider, you can also send messages to your care team and make appointments. If you have questions, please call your primary care clinic.  If you do not have a primary care provider, please call [phone] and they will assist you.        Care EveryWhere ID     This is your Care EveryWhere ID. This could be used by other organizations to access your [facility] medical records  [id number]        Your Vitals Were     Pulse Height Pulse Oximetry BMI (Body Mass Index)          116 1.753 m (5' 9\") 100% 24.97 kg/m2         Blood Pressure from Last 3 Encounters:   02/21/18 (!) 87/54   02/14/18 111/69   02/01/18 97/65    Weight from Last 3 Encounters:   02/21/18 76.7 kg (169 lb 1.6 oz)   02/14/18 74.3 kg (163 lb 11.2 oz)   02/01/18 79.8 kg (176 lb)                 Today's Medication Changes          These changes are accurate as of 2/21/18 10:17 AM.  If you have any questions, ask your nurse or doctor.               These medicines have changed or have updated prescriptions.        Dose/Directions    fluticasone 50 MCG/ACT spray   Commonly known as:  FLONASE   This may have changed:  how much to take   Used for:  Nasal congestion        Dose:  1-2 spray   Spray 1-2 sprays into both nostrils daily "   Quantity:  16 g   Refills:  11                Primary Care Provider Office Phone # Fax #    Yahir Turcios [phone]  [facility] [address]        Equal Access to Services     JOHN HAUSER : Hadii marsha ku kaileyo Sotracey, waaxda luqadaha, qaybta kaalmada adeegyada, jose robgary ayala. So [facility] [phone].    ATENCIÓN: Si habla español, tiene a ortiz disposición servicios gratuitos de asistencia lingüística. Llame al [phone].    We comply with applicable federal civil rights laws and [place] laws. We do not discriminate on the basis of race, color, national origin, age, disability, sex, sexual orientation, or gender identity.            Thank you!     Thank you for choosing [facility]  for your care. Our goal is always to provide you with excellent care. Hearing back from our patients is one way we can continue to improve our services. Please take a few minutes to complete the written survey that you may receive in the mail after your visit with us. Thank you!             Your Updated Medication List - Protect others around you: Learn how to safely use, store and throw away your medicines at www.disposemymeds.org.          This list is accurate as of 2/21/18 10:17 AM.  Always use your most recent med list.                   Brand Name Dispense Instructions for use Diagnosis    ACETAMINOPHEN PO      Take 500-1,000 mg by mouth nightly as needed for pain        albuterol 108 (90 BASE) MCG/ACT Inhaler    PROAIR HFA/PROVENTIL HFA/VENTOLIN HFA    1 Inhaler    Inhale 2 puffs into the lungs every 6 hours as needed for shortness of breath / dyspnea or wheezing    Cough       * allopurinol 100 MG tablet    ZYLOPRIM    30 tablet    Take 1 tablet (100 mg) by mouth daily Take with 300 mg tabs for 400 mg /day total.    Chronic gout of wrist, unspecified cause, unspecified laterality, Gout, unspecified cause, unspecified chronicity, unspecified site       *  allopurinol 300 MG tablet    ZYLOPRIM    30 tablet    Take 1 tablet (300 mg) by mouth daily Take with 100 mg tabs for 400 mg /day total.    Chronic gout of wrist, unspecified cause, unspecified laterality, Gout, unspecified cause, unspecified chronicity, unspecified site       aspirin 81 MG tablet      Take 1 tablet (81 mg) by mouth at bedtime        atorvastatin 40 MG tablet    LIPITOR    90 tablet    Take 1 tablet (40 mg) by mouth daily    CKD (chronic kidney disease) stage 3, GFR 30-59 ml/min, Hyperlipidemia LDL goal <100       B-COMPLEX PO      Take 1 tablet by mouth daily        bismuth subsalicylate 262 MG/15ML suspension    PEPTO BISMOL     Take 15 mLs by mouth every 6 hours as needed for indigestion        calcium acetate (Phos Binder) 667 MG Tabs     180 tablet    Take 1 tablet by mouth 3 times daily (with meals)    Hyperphosphatemia       fluticasone 50 MCG/ACT spray    FLONASE    16 g    Spray 1-2 sprays into both nostrils daily    Nasal congestion       loratadine 10 MG tablet    CLARITIN     Take 10 mg by mouth daily as needed Reported on 5/3/2017    Hypertensive cardiopathy, SOB (shortness of breath)       LOSARTAN POTASSIUM PO      Take 25 mg by mouth daily        midodrine HCl 10 MG Tabs     90 tablet    Take 1 tablet by mouth three times a week    Hemodialysis-associated hypotension       omeprazole 20 MG CR capsule    priLOSEC     Take 20 mg by mouth daily At night        order for DME     1 Units    Equipment being ordered: Commode () Treatment Diagnosis: ESRD on PD Pt has to be connected to PD all night and can not be disconnected, hence impending his mobility to go to the bathroom. At risk for infection if he does not have this equipment.    CKD (chronic kidney disease) stage 5, GFR less than 15 ml/min (H)       predniSONE 5 MG tablet    DELTASONE    90 tablet    Take 1 tablet (5 mg) by mouth daily    Gout, unspecified cause, unspecified chronicity, unspecified site, Chronic gout of wrist,  unspecified cause, unspecified laterality       traMADol 50 MG tablet    ULTRAM    50 tablet    Take 1 tablet (50 mg) by mouth every 6 hours as needed for moderate pain    Abdominal pain, generalized       * Notice:  This list has 2 medication(s) that are the same as other medications prescribed for you. Read the directions carefully, and ask your doctor or other care provider to review them with you.
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
## 2018-02-21 NOTE — NURSING NOTE
Diet: Patient instructed regarding a heart healthy diet, including discussion of reduced fat and sodium intake. Patient demonstrated understanding of this information and agreed to call with further questions or concerns.  Labs: Patient was given results of the laboratory testing obtained today. Patient demonstrated understanding of this information and agreed to call with further questions or concerns.   Return Appointment: Patient given instructions regarding scheduling next clinic visit. Patient demonstrated understanding of this information and agreed to call with further questions or concerns.  Medication Change: Patient was educated regarding prescribed medication change, including discussion of the indication, administration, side effects, and when to report to MD or RN. Patient demonstrated understanding of this information and agreed to call with further questions or concerns.  Patient stated he understood all health information given and agreed to call with further questions or concerns.

## 2018-02-21 NOTE — NURSING NOTE
Chief Complaint   Patient presents with     Follow Up For     Return [facility]; 63 yr old male with a h/o chronic systolic HF secondary to ischemic cardiomyopathy and ESRD presenting for follow-up with labs prior     Vitals were taken and medications were reconciled.     Polly Del Real MA    9:12 AM

## 2018-02-21 NOTE — LETTER
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 hours as needed for indigestion       calcium acetate, Phos Binder, 667 MG TABS Take 1 tablet by mouth 3 times daily (with meals) 180 tablet 3     predniSONE (DELTASONE) 5 MG tablet Take 1 tablet (5 mg) by mouth daily 90 tablet 1     allopurinol (ZYLOPRIM) 100 MG tablet Take 1 tablet (100 mg) by mouth daily Take with 300 mg tabs for 400 mg /day total. 30 tablet 2     allopurinol (ZYLOPRIM) 300 MG tablet Take 1 tablet (300 mg) by mouth daily Take with 100 mg tabs for 400 mg /day total. 30 tablet 2     albuterol (PROAIR HFA/PROVENTIL HFA/VENTOLIN HFA) 108 (90 BASE) MCG/ACT Inhaler Inhale 2 puffs into the lungs every 6 hours as needed for shortness of breath / dyspnea or wheezing 1 Inhaler 0     fluticasone (FLONASE) 50 MCG/ACT spray Spray 1-2 sprays into both nostrils daily (Patient taking differently: Spray 2 sprays into both nostrils daily ) 16 g 11     atorvastatin (LIPITOR) 40 MG tablet Take 1 tablet (40 mg) by mouth daily 90 tablet 3     omeprazole (PRILOSEC) 20 MG CR capsule Take 20 mg by mouth daily At night    "    loratadine (CLARITIN) 10 MG tablet Take 10 mg by mouth daily as needed Reported on 5/3/2017       ASPIRIN 81 MG OR TABS Take 1 tablet (81 mg) by mouth at bedtime       order for DME Equipment being ordered: Carol ()  Treatment Diagnosis: ESRD on PD  Pt has to be connected to PD all night and can not be disconnected, hence impending his mobility to go to the bathroom. At risk for infection if he does not have this equipment. (Patient not taking: Reported on 2/21/2018) 1 Units 0       ROS:  Constitutional: Overall fatigue. Denies fever, chills, or diaphoresis.  6 lb weight gain.    ENT: Denies visual disturbance, hearing loss, epistaxis, vertigo,   Respiratory:  See HPI  Cardiovascular: As per HPI.   GI: + abdominal pain relieved with bowel movement.  Denies nausea, vomiting, diarrhea, hematemesis, melena, or hematochezia.   : No urinary incontinence which he had previously, no dysuria, or hematuria.   Integument: Negative for bruising, rash, or pruritis.  No concerns regarding HD port.  Psychiatric: Negative for anxiety, depression, sleep disturbance, or mood changes.   Neuro: Negative for headaches, syncope, numbness or tingling.   Endocrinology: Negative for thyroid disorder, night sweats, diabetes.   Musculoskeletal: Negative for gout or joint stiffness.    EXAM:  BP (!) 87/54 (BP Location: Left arm, Patient Position: Chair, Cuff Size: Adult Regular)  Pulse 116  Ht 1.753 m (5' 9\")  Wt 76.7 kg (169 lb 1.6 oz)  SpO2 100%  BMI 24.97 kg/m2  Dry weight 162 lbs.   General: alert, articulate, and in no acute distress.  HEENT: normocephalic, atraumatic, anicteric sclera, EOMI, normal palate, mucosa moist, dentition intact, no cyanosis.    Neck: neck supple.  No adenopathy, masses, or carotid bruits.  JVP mid neck when sitting upright.   Heart: Tachycardic, normal S1/S2, no murmur, gallop, rub.  Precordium quiet.     Lungs: clear, no rales, ronchi, or wheezing.  No accessory muscle use, respirations " unlabored.   Abdomen: soft, non-tender, bowel sounds present, no hepatomegaly  Extremities: no clubbing, cyanosis or edema.  2+ pedal pulses.   Neurological: alert and oriented x 3.  normal speech and affect, no gross motor deficits  Skin:  No ecchymoses or rashes. Extremities warm.    Labs:  CBC RESULTS:  Lab Results   Component Value Date    WBC 9.4 02/19/2018    RBC 3.01 (L) 02/19/2018    HGB 9.8 (L) 02/19/2018    HCT 30.6 (L) 02/19/2018     (H) 02/19/2018    MCH 32.6 02/19/2018    MCHC 32.0 02/19/2018    RDW 17.4 (H) 02/19/2018     02/19/2018       CMP RESULTS:  Lab Results   Component Value Date     02/19/2018    POTASSIUM 5.0 02/19/2018    CHLORIDE 101 02/19/2018    CO2 22 02/19/2018    ANIONGAP 11 02/19/2018     (H) 02/19/2018    BUN 56 (H) 02/19/2018    CR 7.84 (H) 02/19/2018    GFRESTIMATED 7 (L) 02/19/2018    GFRESTBLACK 8 (L) 02/19/2018    TRINI 9.5 02/19/2018    BILITOTAL 0.6 02/19/2018    ALBUMIN 2.7 (L) 02/19/2018    ALKPHOS 102 02/19/2018    ALT 15 02/19/2018    AST 18 02/19/2018        INR RESULTS:  Lab Results   Component Value Date    INR 1.18 (H) 01/16/2018       No components found for: CK  Lab Results   Component Value Date    MAG 1.9 02/12/2018     Lab Results   Component Value Date    NTBNP 71065 (H) 11/08/2016       Coronary Angiogram:  2/8/17  CORONARY ANGIOGRAM:   1. Both coronary arteries arise from their respective cusps.  2. Dominance: Right  3. The LM is short and has moderate calcification proximally. It is without angiographic evidence of disease.   4. LAD: Type 3 [LAD supplies the entire apex]. The LAD gives rise to multiple rather long septal perforators, moderate caliber D1, small D2, and small D3. There is mild 25% diffuse apical LAD disease, but otherwise no significant disease elsewhere.  5. LCX is large in caliber proximally. There is a medium caliber OM1 which bifurcates and a small OM2 distally. OM1 has a 25% proximal stenosis. The LCx continues as  small vessel into the AV groove.  6. 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 RCA has diffuse calcification with ~25% diffuse disease in proximal portion. There is a focal 50% stenosis in the mid vessel which was suboptimally imaged. FFR was 0.89 at max hyperemia (as noted below). There is diffuse 25% disease distally. The PL system and PDA are free of significant disease.  7. Ramus: medium caliber vessel which has a 50% proximal stenosis.        FUNCTIONAL ASSESSMENT:  Adequate anticoagulation was was obtained with IV UFH. A Radi Aeris wire was passed across the lesion.  Hyperemia was induced with IV Adenosine (140 mcg/kg/min).  The FFR at peak hyperemia was 0.89.         Most recent echocardiogram:  Recent Results (from the past 8760 hour(s))   ECHO COMPLETE WITH OPTISON    Narrative    [id number]  [id number]  [id number]  [id number]^ERNESTO^DIONNA^SANA           [facility]  Diagnostic and Treamtent-3rd Floor  [address] [place] [address]     Name: SANCHEZ MCNEIL  MRN: [id number]  : 1955  Study Date: 2018 02:50 PM  Age: 63 yrs  Gender: Male  [place] [facility] [place] Study: , Bicuspid aortic valve, Hyperlipidemia LDL goal <100,  Coronary  Ordering Physician: DIONNA OROZCO  Referring Physician: DIONNA OROZCO  Performed By: Monroe Snow RDCS     BSA: 2.0 m2  Height: 69 in  Weight: 176 lb  BP: 97/65 mmHg  _____________________________________________________________________________  __        Procedure  Echocardiogram with two-dimensional, color and spectral Doppler performed.  Contrast Optison. Optison (NDC #6059-4918-81) given intravenously. Patient was  given 6 ml mixture of 3 ml Optison and 6 ml saline. 3 ml wasted.  _____________________________________________________________________________  __        Interpretation Summary  The Ejection Fraction is estimated at 10-15%. Severe left ventricular  dilation  is present.  Global right ventricular function is moderately reduced.  Moderate mitral insufficiency is present.  Mild tricuspid insufficiency is present.  The aortic valve is bicuspid. Moderate aortic insufficiency is present.  Right ventricular systolic pressure is 31mmHg above the right atrial pressure.  The inferior vena cava was normal in size with preserved respiratory  variability. Estimated mean right atrial pressure is 3 mmHg.  The sinotubular ridge is effaced. Ascending aorta is dilated at 4.2 cm.  This study was compared with the study from 4/10/2017. Aortic regurgitation,  LV, RV function and LV dilatation worsened.     Patient experienced shortness of breath during the exam and he is sent to the  ED for further evaluation.     _____________________________________________________________________________  __        Left Ventricle  Severe left ventricular dilation is present. Severely abnormal eccentric  hypertrophy. The Ejection Fraction is estimated at 10-15%.     Right Ventricle  The right ventricle is normal size. Global right ventricular function is  moderately reduced.     Atria  Mild right atrial enlargement is present. Severe left atrial enlargement is  present.     Mitral Valve  Mild mitral annular calcification is present. Moderate mitral insufficiency is  present. The effective regurgitant orifice area is _ cm^2. MR volume is 26   ml.        Aortic Valve  Mild aortic valve sclerosis is present. The aortic valve is bicuspid. Moderate  aortic insufficiency is present. Vena contracta is 4.5 mm. PHT is 159 ms.  Patient is tachycardic at 111 bpm. AI ERO is 0.29 cm2.     Tricuspid Valve  Mild tricuspid insufficiency is present. The right ventricular systolic  pressure is approximated at 31.2 mmHg plus the right atrial pressure. Right  ventricular systolic pressure is 31mmHg above the right atrial pressure.     Pulmonic Valve  The pulmonic valve is normal. Trace pulmonic insufficiency is  present.     Vessels  The inferior vena cava was normal in size with preserved respiratory  variability. Ascending aorta 4.2 cm. The sinotubular ridge is effaced.  Estimated mean right atrial pressure is 3 mmHg.     Pericardium  No pericardial effusion is present.        Compared to Previous Study  This study was compared with the study from 4/10/2017 . Aortic regurgitation  and LV function worsened. LV became severely dilated.  _____________________________________________________________________________  __  MMode/2D Measurements & Calculations     IVSd: 0.69 cm  LVIDd: 7.9 cm  LVIDs: 7.4 cm  LVPWd: 0.93 cm  FS: 5.8 %  EDV(Teich): 333.3 ml  ESV(Teich): 291.3 ml  LV mass(C)d: 308.9 grams  LV mass(C)dI: 157.9 grams/m2  asc Aorta Diam: 4.2 cm  LVOT diam: 2.3 cm  LVOT area: 4.2 cm2  LA Volume (BP): 115.0 ml  LA Volume Index (BP): 58.7 ml/m2  RWT: 0.24           Doppler Measurements & Calculations  MV E max blake: 118.0 cm/sec  MV dec time: 0.10 sec  AI P1/2t: 134.8 msec  TR max blake: 279.1 cm/sec  TR max P.2 mmHg     _____________________________________________________________________________  __           Report approved by: Sapphire ALCARAZ 2018 05:39 PM      Echocardiogram Complete    Narrative    [id number]  [id number]  [id number]  [id number]^CIERRA^SHAILESH^           [facility],[place]  Echocardiography Laboratory  [address] [place] [id number]     Name: SANCHEZ MCNEIL  MRN: [id number]  : 1955  Study Date: 04/10/2017 09:26 AM  Age: 62 yrs  Gender: Male  [place] [facility] [place] For Study: Heart Failure  Ordering Physician: SHAILESH NORTON  Performed By: Jasper Spangler RDCS     BSA: 2.0 m2  Height: 69 in  Weight: 194 lb  HR: 90  BP: 137/82 mmHg  _____________________________________________________________________________  __        Procedure  Complete Portable Echo Adult.  _____________________________________________________________________________  __         Interpretation Summary  Left ventricular systolic function is severely reduced with ejection fraction  estimated at 20-25% with severe global hypokinesis. The left ventricle is  severely dilated (LVIDd 7.60cm) with normal thickness.  Global right ventricular function is normal. Mild right ventricular dilation  is present.  The aortic valve is functionally bicuspid with fusion of the left and right  coronary cusps with sclerosis. There is mild to moderate eccentric aortic  insufficiency that is directed posteriorly.  The aortic root is dilated at the sinus of valsalva(4.6cm). There is a  moderate-sized aneurym involving the proximal ascending aorta (4.6cm).  Dilation of the inferior vena cava is present with normal respiratory  variation in diameter.  Compared to the previous study dated 2/2016, there has been interval reduction  in left ventricular systolic function.     _____________________________________________________________________________  __        Left Ventricle  Left ventricular systolic function is severely reduced with ejection fraction  estimated at 20-25% with severe global hypokinesis. The left ventricle is  severely dilated (LVIDd 7.60cm) with normal thickness. Grade II or moderate  diastolic dysfunction.     Right Ventricle  Global right ventricular function is normal. Mild right ventricular dilation  is present.     Atria  Severe left atrial enlargement is present. Moderate right atrial enlargement  is present. The atrial septum is intact as assessed by color Doppler .     Mitral Valve  Mitral leaflet thickness is increased . Mild mitral insufficiency is present.        Aortic Valve  The aortic valve is functionally bicuspid with fusion of the left and right  coronary cusps with sclerosis. There is mild to moderate eccentric aortic  insufficiency that is directed posteriorly.     Tricuspid Valve  The tricuspid valve is normal. Mild tricuspid insufficiency is present.     Pulmonic Valve  The  pulmonic valve is normal.     Vessels  The aortic root is dilated at the sinus of valsalva(4.6cm). There is a  moderate-sized aneurym involving the proximal ascending aorta (4.6cm). The  pulmonary artery is normal. Dilation of the inferior vena cava is present with  normal respiratory variation in diameter.     Pericardium  No pericardial effusion is present.     _____________________________________________________________________________  __  MMode/2D Measurements & Calculations  IVSd: 1.0 cm  LVIDd: 7.5 cm  LVIDs: 6.7 cm  LVPWd: 0.93 cm  FS: 10.5 %  EDV(Teich): 301.0 ml  ESV(Teich): 234.5 ml     LV mass(C)d: 356.3 grams  asc Aorta Diam: 4.6 cm  LVOT diam: 2.5 cm  LVOT area: 4.9 cm2  LA Volume (BP): 130.0 ml  LA Volume Index (BP): 63.7 ml/m2        Doppler Measurements & Calculations  MV E max blake: 118.0 cm/sec  MV A max blake: 73.2 cm/sec  MV E/A: 1.6  MV dec time: 0.10 sec  Ao V2 max: 169.0 cm/sec  Ao max P.0 mmHg  LINDSEY(V,D): 3.4 cm2     LV V1 max P.6 mmHg  LV V1 max: 118.0 cm/sec  Lateral E/e': 14.4  Medial E/e': 28.6           _____________________________________________________________________________  __           Report approved by: Sunny Porter 04/10/2017 11:33 AM            Assessment and Plan:    In summary, Murray is a 63 year old gentleman with worsening systolic heart failure who returns for follow up after a recent hospitalization.  He appears hypervolemic today.  I am concerned about his low BP readings and his lightheadedness associated with it.  I have asked him to decrease his Losartan to 12.5 mg daily.  Hold losartan if BP is below 100 systolic.  He will contact us next week with an update and we will adjust from there.  He will follow up with Dr. Ernst in a couple of weeks for consideration for heart/kidney work up and transplant candidacy.    In regards to colonoscopy, I have asked him to hold off until his fluid status and BP have stabilized more.    1.  C hronic systolic heart  failure secondary to ICM. Stage C  NYHA Class III  ACEi/ARB: yes, Losartan 100 mg daily.   BB:  No, deferred secondary to hypotension   Aldosterone antagonist: contraindicated due to renal dysfunction  SCD prophylaxis: decision deferred during medication uptitration  Fluid status: Hypervolemic  Anticoagulation: None.   Antiplatelet:  ASA 81 mg daily.   Sleep Apnea:  Doesn't tolerate CPAP.   Not using.    2.  Hypotension: Decrease Losartan to 12.5 mg daily.  Hold if systolic BP is less than 100. Call next week with an update.     3.  CAD:  No angina. Coronary angiogram 2/8/17.  See above report.  No obstructive CAD.  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  Ascending aortic aneurysm:  4.5 x 4.5 cm proximal ascending aortic aneurysm seen on MRI done 2/14. Recent echo done 2/2/18 showed size of 4.2 cm.  Will need routine surveillance.         5.  CKD:  Currently on hemodialysis three times a week with consideration for 4x's week therapy.  Managed by Dr. Mcpherson.        6.  Tachycardia:  Beta blocker discontinued secondary to hypotension approximately a week ago.  Could be rebound tachycardia but I suspect that his low BP readings are contributing as well.  He will call us next week with a HR and BP update after decreasing Losartan or sooner if sx change or worsen in the interim.    7.  Follow-up CORE clinic in two weeks with BMP prior.  Follow up with Dr. Ernst next avail.        Kristi MARTIN, [facility]  CORE Clinic   [phone]

## 2018-02-21 NOTE — PATIENT INSTRUCTIONS
Preventive Care:    Colorectal Cancer Screening: During our visit today, we discussed that it is recommended you receive colorectal cancer screening. Please call or make an appointment with your primary care provider to discuss this. You may also call the [facility] scheduling line ([phone]) to set up a colonoscopy appointment.    You were seen today in the Cardiovascular Clinic at the [facility].     Cardiology Providers you saw during your visit: Kristi Ayon NP     1. Decrease Losartan to 12. 5 mg daily. Check Blood Pressures daily, if SBP (top number) is less then 100, hold Losartan. Write down blood pressures over the weekend and send us a message on Monday with them.   2. Follow up in CORE Clinic with Ramya Suh on March 7th at 8:00 with labs prior.   3. No colonoscopy for now until BPs come up.   4. Follow up with Dr Fontenot next available appointment.      Results for SANCHEZ MCNEIL (MRN [id number]) as of 2/21/2018 09:32   Ref. Range 2/21/2018 09:00   Sodium Latest Ref Range: 133 - 144 mmol/L 136   Potassium Latest Ref Range: 3.4 - 5.3 mmol/L 4.0   Chloride Latest Ref Range: 94 - 109 mmol/L 102   Carbon Dioxide Latest Ref Range: 20 - 32 mmol/L 24   Urea Nitrogen Latest Ref Range: 7 - 30 mg/dL 32 (H)   Creatinine Latest Ref Range: 0.66 - 1.25 mg/dL 5.79 (H)   GFR Estimate Latest Ref Range: >60 mL/min/1.7m2 10 (L)   GFR Estimate If Black Latest Ref Range: >60 mL/min/1.7m2 12 (L)   Calcium Latest Ref Range: 8.5 - 10.1 mg/dL 9.2   Anion Gap Latest Ref Range: 3 - 14 mmol/L 10         Please limit your fluid intake to 2 L (64 ounces) daily.  2 Liters a day = 8.5 cups, or 72 ounces.  Please limit your salt intake to 2 grams a day or less.     If you gain 2# in 24 hours or 5# in one week call Matilda Morales RN so we can adjust your medications as needed over the phone.     Please feel free to call me with any questions or concerns.       Matilda Morales RN  [facility]  "[facility]  Cardiology Care Coordinator-Heart Failure Clinic     Questions and [phone].   First press #1 for the University and then press #3 for \"Medical Questions\" to reach us Cardiology Nurses.      On Call Cardiologist for after hours or on weekends: [phone]   option #4 and ask to speak to the on-call Cardiologist. Inform them you are a CORE/heart failure patient at the [facility].           If you need a medication refill please contact your pharmacy.  Please allow 3 business days for your refill to be completed.  _______________________________________________________  C.O.R.E. CLINIC Cardiomyopathy, Optimization, Rehabilitation, Education   The C.O.R.E. CLINIC is a heart failure specialty clinic within the [facility] where you will work with specialized nurse practitioners dedicated to helping patients with heart failure carefully adjust medications, receive education, and learn who and when to call if symptoms develop. They specialize in helping you better understand your condition, slow the progression of your disease, improve the length and quality of your life, help you detect future heart problems before they become life threatening, and avoid hospitalizations.  As always, thank you for trusting us with your health care needs!       "
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
## 2018-02-23 ENCOUNTER — OFFICE VISIT (OUTPATIENT)
Dept: FAMILY MEDICINE | Facility: CLINIC | Age: 63
End: 2018-02-23
Payer: COMMERCIAL

## 2018-02-23 VITALS
HEART RATE: 125 BPM | WEIGHT: 168.2 LBS | TEMPERATURE: 97.7 F | OXYGEN SATURATION: 100 % | RESPIRATION RATE: 18 BRPM | DIASTOLIC BLOOD PRESSURE: 58 MMHG | BODY MASS INDEX: 24.84 KG/M2 | SYSTOLIC BLOOD PRESSURE: 99 MMHG

## 2018-02-23 DIAGNOSIS — E11.22 TYPE 2 DIABETES MELLITUS WITH STAGE 5 CHRONIC KIDNEY DISEASE NOT ON CHRONIC DIALYSIS, WITHOUT LONG-TERM CURRENT USE OF INSULIN (H): ICD-10-CM

## 2018-02-23 DIAGNOSIS — N18.5 CKD (CHRONIC KIDNEY DISEASE) STAGE 5, GFR LESS THAN 15 ML/MIN (H): ICD-10-CM

## 2018-02-23 DIAGNOSIS — N18.5 TYPE 2 DIABETES MELLITUS WITH STAGE 5 CHRONIC KIDNEY DISEASE NOT ON CHRONIC DIALYSIS, WITHOUT LONG-TERM CURRENT USE OF INSULIN (H): ICD-10-CM

## 2018-02-23 DIAGNOSIS — I50.22 CHRONIC SYSTOLIC CONGESTIVE HEART FAILURE (H): Primary | ICD-10-CM

## 2018-02-23 PROCEDURE — 99495 TRANSJ CARE MGMT MOD F2F 14D: CPT | Performed by: FAMILY MEDICINE

## 2018-02-23 NOTE — Clinical Note
Can yo give him a call for me. I noted after the visit his blood sugars (A1C tests) have been high. We should be treating this. There is not any safe options except for insulin. I would have him start on levemir or similar at 5 units daily. I wonder if we can get him to sandro to help with teaching to help start this. If he has done insuling before we can just send it in.   Thanks,   Yahir Turcios

## 2018-02-23 NOTE — MR AVS SNAPSHOT
After Visit Summary   2/23/2018    Murray Nicholson    MRN: [id number]           Patient Information     Date Of Birth          1955        Visit Information        Provider Department      2/23/2018 11:40 AM Yahir Turcios MD [facility]        Care Instructions    Hold losartan if BP is below 98 systolic. Only take the 12.5mg if the bp is over that number.           Follow-ups after your visit        Your next 10 appointments already scheduled     Mar 07, 2018  7:45 AM CST   Lab with  LAB   [facility] ([facility])    [address] [place] [address]  1st Floor  [facility] [address]   [phone]            Mar 07, 2018  8:00 AM CST   (Arrive by 7:45 AM)   CORE RETURN with VERONICA Rocha CNP   [facility] ([facility])    [address] [place] [address]  [address]  [facility] [address]   [phone]            Mar 12, 2018  1:00 PM CDT   (Arrive by 12:45 PM)   NEW HEART FAILURE with Klever Ernst MD   [facility] ([facility])    [address] [place] [address]  [address]  [facility] [address]   [phone]            Mar 16, 2018  9:30 AM CDT   (Arrive by 9:15 AM)   Return Visit with Darvin Tang MD   [facility] ([facility])    [address] [place] [address]  [address]  [facility] [address]   [phone]            Mar 28, 2018 10:30 AM CDT   Cardiac Evaluation with  Cardiac Rehab 1   [facility] Cardiac [facility] ([facility])    [address] [place] [address] 1st Floor [address]  [facility] [id number] [phone]              Who to contact     If you have questions or need follow up information about today's clinic visit or your schedule please contact [facility] directly at [phone].  Normal or non-critical lab and imaging results will be communicated to you by  MyChart, letter or phone within 4 business days after the clinic has received the results. If you do not hear from us within 7 days, please contact the clinic through SeeFuture or phone. If you have a critical or abnormal lab result, we will notify you by phone as soon as possible.  Submit refill requests through SeeFuture or call your pharmacy and they will forward the refill request to us. Please allow 3 business days for your refill to be completed.          Additional Information About Your Visit        SeeFuture Information     SeeFuture gives you secure access to your electronic health record. If you see a primary care provider, you can also send messages to your care team and make appointments. If you have questions, please call your primary care clinic.  If you do not have a primary care provider, please call [phone] and they will assist you.        Care EveryWhere ID     This is your Care EveryWhere ID. This could be used by other organizations to access your [facility] medical records  [id number]        Your Vitals Were     Pulse Temperature Respirations Pulse Oximetry BMI (Body Mass Index)       125 97.7  F (36.5  C) (Oral) 18 100% 24.84 kg/m2        Blood Pressure from Last 3 Encounters:   02/23/18 99/58   02/21/18 (!) 87/54   02/14/18 111/69    Weight from Last 3 Encounters:   02/23/18 168 lb 3.2 oz (76.3 kg)   02/21/18 169 lb 1.6 oz (76.7 kg)   02/14/18 163 lb 11.2 oz (74.3 kg)              Today, you had the following     No orders found for display       Primary Care Provider Office Phone # Fax #    Yahir Turcios [phone] [address]  [facility] [address]        Equal Access to Services     PRISCA HAUSER : Hadii marsha Thomas, guidoda lurodyadaha, qaybta kaalmajose peña. So [facility] [phone].    ATENCIÓN: Si habla español, tiene a ortiz disposición servicios gratuitos de asistencia lingüística. Llame al [phone].    We  comply with applicable federal civil rights laws and [place] laws. We do not discriminate on the basis of race, color, national origin, age, disability, sex, sexual orientation, or gender identity.            Thank you!     Thank you for choosing [facility]  for your care. Our goal is always to provide you with excellent care. Hearing back from our patients is one way we can continue to improve our services. Please take a few minutes to complete the written survey that you may receive in the mail after your visit with us. Thank you!             Your Updated Medication List - Protect others around you: Learn how to safely use, store and throw away your medicines at www.disposemymeds.org.          This list is accurate as of 2/23/18 12:31 PM.  Always use your most recent med list.                   Brand Name Dispense Instructions for use Diagnosis    ACETAMINOPHEN PO      Take 500-1,000 mg by mouth nightly as needed for pain        albuterol 108 (90 BASE) MCG/ACT Inhaler    PROAIR HFA/PROVENTIL HFA/VENTOLIN HFA    1 Inhaler    Inhale 2 puffs into the lungs every 6 hours as needed for shortness of breath / dyspnea or wheezing    Cough       * allopurinol 100 MG tablet    ZYLOPRIM    30 tablet    Take 1 tablet (100 mg) by mouth daily Take with 300 mg tabs for 400 mg /day total.    Chronic gout of wrist, unspecified cause, unspecified laterality, Gout, unspecified cause, unspecified chronicity, unspecified site       * allopurinol 300 MG tablet    ZYLOPRIM    30 tablet    Take 1 tablet (300 mg) by mouth daily Take with 100 mg tabs for 400 mg /day total.    Chronic gout of wrist, unspecified cause, unspecified laterality, Gout, unspecified cause, unspecified chronicity, unspecified site       aspirin 81 MG tablet      Take 1 tablet (81 mg) by mouth at bedtime        atorvastatin 40 MG tablet    LIPITOR    90 tablet    Take 1 tablet (40 mg) by mouth daily    CKD (chronic kidney disease) stage 3, GFR  30-59 ml/min, Hyperlipidemia LDL goal <100       B-COMPLEX PO      Take 1 tablet by mouth daily        bismuth subsalicylate 262 MG/15ML suspension    PEPTO BISMOL     Take 15 mLs by mouth every 6 hours as needed for indigestion        calcium acetate (Phos Binder) 667 MG Tabs     180 tablet    Take 1 tablet by mouth 3 times daily (with meals)    Hyperphosphatemia       fluticasone 50 MCG/ACT spray    FLONASE    16 g    Spray 1-2 sprays into both nostrils daily    Nasal congestion       loratadine 10 MG tablet    CLARITIN     Take 10 mg by mouth daily as needed Reported on 5/3/2017    Hypertensive cardiopathy, SOB (shortness of breath)       losartan 25 MG tablet    COZAAR    30 tablet    Take 1/2 tab (12.5 mg) daily    Systolic heart failure, unspecified heart failure chronicity (H)       midodrine HCl 10 MG Tabs     90 tablet    Take 1 tablet by mouth three times a week    Hemodialysis-associated hypotension       omeprazole 20 MG CR capsule    priLOSEC     Take 20 mg by mouth daily At night        order for DME     1 Units    Equipment being ordered: Carol () Treatment Diagnosis: ESRD on PD Pt has to be connected to PD all night and can not be disconnected, hence impending his mobility to go to the bathroom. At risk for infection if he does not have this equipment.    CKD (chronic kidney disease) stage 5, GFR less than 15 ml/min (H)       predniSONE 5 MG tablet    DELTASONE    90 tablet    Take 1 tablet (5 mg) by mouth daily    Gout, unspecified cause, unspecified chronicity, unspecified site, Chronic gout of wrist, unspecified cause, unspecified laterality       traMADol 50 MG tablet    ULTRAM    50 tablet    Take 1 tablet (50 mg) by mouth every 6 hours as needed for moderate pain    Abdominal pain, generalized       * Notice:  This list has 2 medication(s) that are the same as other medications prescribed for you. Read the directions carefully, and ask your doctor or other care provider to review them  with you.

## 2018-02-23 NOTE — NURSING NOTE
"Chief Complaint   Patient presents with     Hospital F/U       Initial BP 99/58 (BP Location: Left arm, Patient Position: Chair, Cuff Size: Adult Regular)  Pulse 125  Temp 97.7  F (36.5  C) (Oral)  Resp 18  Wt 168 lb 3.2 oz (76.3 kg)  SpO2 100%  BMI 24.84 kg/m2 Estimated body mass index is 24.84 kg/(m^2) as calculated from the following:    Height as of 2/21/18: 5' 9\" (1.753 m).    Weight as of this encounter: 168 lb 3.2 oz (76.3 kg).  Medication Reconciliation: complete     Ruben Benavidez MA      "

## 2018-02-23 NOTE — PROGRESS NOTES
SUBJECTIVE:   Murray Nicholson is a 63 year old male who presents to clinic today for the following health issues:          Hospital Follow-up Visit:    Hospital/Nursing Home/IP Rehab Facility: [facility]  Date of Admission: 2/2/18  Date of Discharge: 2/14/18  Reason(s) for Admission: SOB and heart failure.            Problems taking medications regularly:  None       Medication changes since discharge: None       Problems adhering to non-medication therapy:  None    Summary of hospitalization:  [facility] discharge summary reviewed  Diagnostic Tests/Treatments reviewed.  Follow up needed: abd pain  Other Healthcare Providers Involved in Patient s Care:         Specialist appointment - CORE clinic/cardiology and nephrology  Update since discharge: improved.      Post Discharge Medication Reconciliation: discharge medications reconciled and changed, per note/orders (see AVS).  Plan of care communicated with patient     Coding guidelines for this visit:  Type of Medical   Decision Making Face-to-Face Visit       within 7 Days of discharge Face-to-Face Visit        within 14 days of discharge   Moderate Complexity [id number]   High Complexity 53843 [id number]          abd pain-he as having some abdomenal pains over the past weekend. These have resolved. He thinks they may have been related to his bowels. He has not had fever. He reports normal bowel movements. No diarrhea. No blood in the stool    CHF-noted to have EF of 10-15% on echo recently. He continues to have dyspnea on exertion. He comes with a log of his bp and pulse. His pulse is routinely . His bp systolic ranges from 80s-110. The metoprolol was stopped during the last hosp stay and the diovan was decreased the last CORE visit to 12.5mg daily. He was to hold the diovan for lower bp but he has not done this.     ESKD on hemodialysis now. He is being considered for a combined heart kidney transplant.     diabetes- last a1C 8.6. Not  on medicaiton. He is on prednisone.     med hx, family hx, and soc hx reviewed    OBJECTIVE: BP 99/58 (BP Location: Left arm, Patient Position: Chair, Cuff Size: Adult Regular)  Pulse 125  Temp 97.7  F (36.5  C) (Oral)  Resp 18  Wt 168 lb 3.2 oz (76.3 kg)  SpO2 100%  BMI 24.84 kg/m2   L clear  Cv: tachy but otherwise normal rhythm.   Abd: s/nt normal bowel sounds. No sings infection.       ICD-10-CM    1. Chronic systolic congestive heart failure (H) I50.22    2. CKD (chronic kidney disease) stage 5, GFR less than 15 ml/min (H) N18.5    3. Type 2 diabetes mellitus with stage 5 chronic kidney disease not on chronic dialysis, without long-term current use of insulin (H) E11.22     N18.5     We should start low dose long acting insulin for his diabetes. Hold diovan if bp lower than 98. He is connected with nephrology and Cardiology. He may need a colonoscopy for screening for pre transplant evaluation.

## 2018-02-26 ENCOUNTER — CARE COORDINATION (OUTPATIENT)
Dept: CARDIOLOGY | Facility: CLINIC | Age: 63
End: 2018-02-26

## 2018-02-26 NOTE — PROGRESS NOTES
Left  for Murray to call me back with BP log after decreasing Losartan last week.   Matilda Morales RN

## 2018-03-01 DIAGNOSIS — R10.84 ABDOMINAL PAIN, GENERALIZED: Primary | ICD-10-CM

## 2018-03-02 ENCOUNTER — TELEPHONE (OUTPATIENT)
Dept: FAMILY MEDICINE | Facility: CLINIC | Age: 63
End: 2018-03-02

## 2018-03-02 DIAGNOSIS — E11.69 TYPE 2 DIABETES MELLITUS WITH OTHER SPECIFIED COMPLICATION, WITHOUT LONG-TERM CURRENT USE OF INSULIN (H): ICD-10-CM

## 2018-03-02 DIAGNOSIS — E11.22 TYPE 2 DIABETES MELLITUS WITH DIABETIC CHRONIC KIDNEY DISEASE (H): Primary | ICD-10-CM

## 2018-03-02 RX ORDER — GLIPIZIDE 5 MG/1
TABLET ORAL
Qty: 90 TABLET | Refills: 0 | Status: CANCELLED | OUTPATIENT
Start: 2018-03-02

## 2018-03-02 NOTE — TELEPHONE ENCOUNTER
Lab Results   Component Value Date    A1C 8.6 02/02/2018    A1C 9.1 01/08/2018    A1C 6.4 06/27/2017    A1C 7.2 04/09/2017    A1C 6.3 11/08/2016     Spoke with pt  Made him an appointment made to see Tono  Pt has only had insulin in the hospital  will put in a referral to [facility] and send Tono a cc to check insurance coverage

## 2018-03-02 NOTE — TELEPHONE ENCOUNTER
I'm going to hold off on this refill, was not on it recently, and the plan as you noted was to start long acting insulin. I think ok to wait until Dr. Turcios is back so he can clarify the plan.  Thanks.

## 2018-03-02 NOTE — TELEPHONE ENCOUNTER
Routing to covering providers while Dr. Turcios is out -- please review request for discontinued medication. At last OV Dr. Turcios noted should start low-dose long acting insulin but wasn't ordered.    Please review/sign, or advise if needs to wait for PCP.    Thank you  Taylor Ballesteros, BSN, RN  [facility]

## 2018-03-02 NOTE — TELEPHONE ENCOUNTER
glipiZIDE (GLUCOTROL) 5 MG tablet   (Discontinued)      Last Written Prescription Date:  8-25-17  Last Fill Quantity: 90 TAB,   # refills: 0  Last Office Visit: 2-23-18  Future Office visit:       Routing refill request to provider for review/approval because:  Drug not active on patient's medication list

## 2018-03-05 ENCOUNTER — CARE COORDINATION (OUTPATIENT)
Dept: CARDIOLOGY | Facility: CLINIC | Age: 63
End: 2018-03-05

## 2018-03-05 DIAGNOSIS — I50.22 CHRONIC SYSTOLIC HEART FAILURE (H): Primary | ICD-10-CM

## 2018-03-07 ENCOUNTER — OFFICE VISIT (OUTPATIENT)
Dept: PHARMACY | Facility: CLINIC | Age: 63
End: 2018-03-07
Payer: COMMERCIAL

## 2018-03-07 VITALS
HEART RATE: 115 BPM | DIASTOLIC BLOOD PRESSURE: 63 MMHG | OXYGEN SATURATION: 99 % | SYSTOLIC BLOOD PRESSURE: 91 MMHG | BODY MASS INDEX: 24.81 KG/M2 | WEIGHT: 168 LBS

## 2018-03-07 DIAGNOSIS — I50.22 CHRONIC SYSTOLIC HEART FAILURE (H): ICD-10-CM

## 2018-03-07 DIAGNOSIS — E11.22 TYPE 2 DIABETES MELLITUS WITH STAGE 5 CHRONIC KIDNEY DISEASE NOT ON CHRONIC DIALYSIS, WITHOUT LONG-TERM CURRENT USE OF INSULIN (H): Primary | ICD-10-CM

## 2018-03-07 DIAGNOSIS — M10.9 GOUT, UNSPECIFIED CAUSE, UNSPECIFIED CHRONICITY, UNSPECIFIED SITE: ICD-10-CM

## 2018-03-07 DIAGNOSIS — N18.5 TYPE 2 DIABETES MELLITUS WITH STAGE 5 CHRONIC KIDNEY DISEASE NOT ON CHRONIC DIALYSIS, WITHOUT LONG-TERM CURRENT USE OF INSULIN (H): Primary | ICD-10-CM

## 2018-03-07 DIAGNOSIS — M1A.0390 CHRONIC GOUT OF WRIST, UNSPECIFIED CAUSE, UNSPECIFIED LATERALITY: ICD-10-CM

## 2018-03-07 LAB
ALP SERPL-CCNC: 129 U/L (ref 40–150)
ALT SERPL W P-5'-P-CCNC: 21 U/L (ref 0–70)
ANION GAP SERPL CALCULATED.3IONS-SCNC: 11 MMOL/L (ref 3–14)
ANISOCYTOSIS BLD QL SMEAR: ABNORMAL
AST SERPL W P-5'-P-CCNC: 18 U/L (ref 0–45)
BASOPHILS # BLD AUTO: 0.2 10E9/L (ref 0–0.2)
BASOPHILS NFR BLD AUTO: 2.6 %
BILIRUB SERPL-MCNC: 0.7 MG/DL (ref 0.2–1.3)
BUN SERPL-MCNC: 49 MG/DL (ref 7–30)
CALCIUM SERPL-MCNC: 9.5 MG/DL (ref 8.5–10.1)
CHLORIDE SERPL-SCNC: 100 MMOL/L (ref 94–109)
CO2 SERPL-SCNC: 25 MMOL/L (ref 20–32)
CREAT SERPL-MCNC: 6.52 MG/DL (ref 0.66–1.25)
DACRYOCYTES BLD QL SMEAR: SLIGHT
DIFFERENTIAL METHOD BLD: ABNORMAL
EOSINOPHIL # BLD AUTO: 0.2 10E9/L (ref 0–0.7)
EOSINOPHIL NFR BLD AUTO: 2.6 %
ERYTHROCYTE [DISTWIDTH] IN BLOOD BY AUTOMATED COUNT: 18.1 % (ref 10–15)
GFR SERPL CREATININE-BSD FRML MDRD: 9 ML/MIN/1.7M2
GLUCOSE SERPL-MCNC: 172 MG/DL (ref 70–99)
HCT VFR BLD AUTO: 34.7 % (ref 40–53)
HGB BLD-MCNC: 11.1 G/DL (ref 13.3–17.7)
LYMPHOCYTES # BLD AUTO: 0.9 10E9/L (ref 0.8–5.3)
LYMPHOCYTES NFR BLD AUTO: 13.2 %
MACROCYTES BLD QL SMEAR: PRESENT
MCH RBC QN AUTO: 32.6 PG (ref 26.5–33)
MCHC RBC AUTO-ENTMCNC: 32 G/DL (ref 31.5–36.5)
MCV RBC AUTO: 102 FL (ref 78–100)
MONOCYTES # BLD AUTO: 0.5 10E9/L (ref 0–1.3)
MONOCYTES NFR BLD AUTO: 7.9 %
NEUTROPHILS # BLD AUTO: 5.1 10E9/L (ref 1.6–8.3)
NEUTROPHILS NFR BLD AUTO: 73.7 %
NRBC # BLD AUTO: 0.2 10*3/UL
NRBC BLD AUTO-RTO: 4 /100
OVALOCYTES BLD QL SMEAR: SLIGHT
PLATELET # BLD AUTO: 257 10E9/L (ref 150–450)
PLATELET # BLD EST: ABNORMAL 10*3/UL
POIKILOCYTOSIS BLD QL SMEAR: SLIGHT
POLYCHROMASIA BLD QL SMEAR: SLIGHT
POTASSIUM SERPL-SCNC: 4.4 MMOL/L (ref 3.4–5.3)
RBC # BLD AUTO: 3.41 10E12/L (ref 4.4–5.9)
SODIUM SERPL-SCNC: 136 MMOL/L (ref 133–144)
URATE SERPL-MCNC: 3.1 MG/DL (ref 3.5–7.2)
WBC # BLD AUTO: 6.9 10E9/L (ref 4–11)

## 2018-03-07 PROCEDURE — 99607 MTMS BY PHARM ADDL 15 MIN: CPT | Performed by: PHARMACIST

## 2018-03-07 PROCEDURE — 99605 MTMS BY PHARM NP 15 MIN: CPT | Performed by: PHARMACIST

## 2018-03-07 RX ORDER — GLIPIZIDE 5 MG/1
5 TABLET ORAL
COMMUNITY
End: 2018-03-16

## 2018-03-07 NOTE — PATIENT INSTRUCTIONS
Recommendations from today's MTM visit:                                                    MTM (medication therapy management) is a service provided by a clinical pharmacist designed to help you get the most of out of your medicines.   Today we reviewed what your medicines are for, how to know if they are working, that your medicines are safe and how to make your medicine regimen as easy as possible.     1. FYI--Please contact your cardiologist office --let them know home BP's are low and you stopped the 12.5mg tab of Losartan . But you did not have any overt side effects to the low BP's.    You have severe heart failure --losartan is a good pill for that . Maybe on dialysis days you dont need losartan on those days. Perhaps the other 4 days of week you should be on it ?     FYI--your BP today in clinic was 91/63mmhg ;pulse 115.     2. FYI-- Your A1c (diabetes lab was too high at 8.6%).   --lets have you check blood sugars 2 x day --First one before breakfast --Gola then is  (today =113) , then check 2 hours after any main meal --Goal - then is for BS to be < 150.    Due to your severe heart failure --we need to be cautious about avoiding any blood sugars <80.     We can look at a pattern of this and decide whether you need more glipizide or a 24 hour insulin.         Next MTM visit: Please send me a [employer] message in 1 week with blood sugar readings -then we can decide if you need any insulin ?   Se me in clinic in 1 month -April 4th at 11;30am --bring blood sugar meter.    To schedule another MTM appointment, please call the clinic directly or you may call the MTM scheduling line at [phone] or toll-free at [phone].     My Clinical Pharmacist's contact information:                                                      It was a pleasure seeing you today!  Please feel free to contact me with any questions or concerns you have.      Tono Clay Rph.  Medication Therapy Management  Provider  [phone]    You may receive a survey about the MTM services you received.  I would appreciate your feedback to help me serve you better in the future. Please fill it out and return it when you can. Your comments will be anonymous.

## 2018-03-07 NOTE — PROGRESS NOTES
SUBJECTIVE/OBJECTIVE:                Murray Nicholson is a 63 year old male coming in for a transitions of care visit.  He was discharged from [facility] on 2-2-18 for CHF, DM, MEADE.       Chief Complaint: Here for help with basal insulin start for help with DM per pcp.   Admits low home bp's --d/c his losartan.     He asks does he really need insulin ?  No dm med on his chart --he thinks he's on glipizide --will call his [facility] pharmacy today to verify .      Personal Healthcare Goals: fix DM.    Allergies/ADRs: Reviewed in Epic  Tobacco: History of tobacco dependence - quit 20+ YEARS AGO    Alcohol: infrequent   Caffeine: 1 cups/day of coffee  Activity: not very --severe HF.  PMH: Per patient on dialysis, severe HF , type-2 DM     Medication Adherence/Access:  Patient uses a pill dispenser.  Patient takes medications 3 time(s) per day.  Per patient, misses medication 1 times per week.   Medication barriers: feelings of burden/being overwhelmed.   The patient fills medications at [place]: NO, fills medications at [facility].      .Diabetes:  Pt currently taking glipizide 5mg tablet qam . Pt is not experiencing side effects.  SMBG: rarely.   Ranges (based on glucometer readings):             Patient is not experiencing hypoglycemia  Recent symptoms of high blood sugar? none  Eye exam: up to date  Foot exam: up to date  Microalbumin is not < 30 mg/g. Pt is not taking an ACEi/ARB.  Aspirin: Taking 81mg daily and denies side effects  Diet/Exercise: minimal exercise, diet healthy      Hypertension: Current medications include was on 12.5 mg losartan but BP too low so he stopped it 2 weeks ago. Patient does self-monitor BP. Home BP monitoring in range of 's systolic over 50-70's diastolic.pulse rates 120's  Patient reports the following medication side effects: no symptoms but BP's too low.     He see's jenn --ranjana purcell .              Current labs include:  BP Readings from Last 3 Encounters:    03/07/18 91/63   02/23/18 99/58   02/21/18 (!) 87/54     Today's Vitals: BP 91/63  Pulse 115  Wt 168 lb (76.2 kg)  SpO2 99%  BMI 24.81 kg/m2  Lab Results   Component Value Date    A1C 8.6 02/02/2018   .  Lab Results   Component Value Date    CHOL 101 04/11/2017     Lab Results   Component Value Date    TRIG 145 04/11/2017     Lab Results   Component Value Date    HDL 34 04/11/2017     Lab Results   Component Value Date    LDL 38 04/11/2017       Liver Function Studies -   Recent Labs   Lab Test  03/07/18   0833  02/19/18   1558   PROTTOTAL   --   7.2   ALBUMIN   --   2.7*   BILITOTAL  0.7  0.6   ALKPHOS  129  102   AST  18  18   ALT  21  15       Lab Results   Component Value Date    UCRR 136 05/17/2017    MICROL 1160 05/19/2015    UMALCR 1266.38 (H) 05/19/2015       Last Basic Metabolic Panel:  Lab Results   Component Value Date     03/07/2018      Lab Results   Component Value Date    POTASSIUM 4.4 03/07/2018     Lab Results   Component Value Date    CHLORIDE 100 03/07/2018     Lab Results   Component Value Date    BUN 49 03/07/2018     Lab Results   Component Value Date    CR 6.52 03/07/2018     GFR Estimate   Date Value Ref Range Status   03/07/2018 9 (L) >60 mL/min/1.7m2 Final     Comment:     Non  GFR Calc   02/21/2018 10 (L) >60 mL/min/1.7m2 Final     Comment:     Non  GFR Calc   02/19/2018 7 (L) >60 mL/min/1.7m2 Final     Comment:     Non  GFR Calc     GFR Estimate If Black   Date Value Ref Range Status   03/07/2018 10 (L) >60 mL/min/1.7m2 Final     Comment:      GFR Calc   02/21/2018 12 (L) >60 mL/min/1.7m2 Final     Comment:      GFR Calc   02/19/2018 8 (L) >60 mL/min/1.7m2 Final     Comment:      GFR Calc     TSH   Date Value Ref Range Status   02/21/2018 3.59 0.40 - 4.00 mU/L Final   ]    Most Recent Immunizations   Administered Date(s) Administered     HEPA 02/09/2010     HepB 04/05/2016      Influenza (H1N1) 02/09/2010     Influenza (IIV3) PF 11/26/2012     Influenza Vaccine IM 3yrs+ 4 Valent IIV4 11/07/2017     Mantoux Tuberculin Skin Test 01/23/2018     Pneumo Conj 13-V (2010&after) 09/23/2015     Pneumococcal 23 valent 02/23/2011     TD (ADULT, 7+) 08/12/2004     TDAP Vaccine (Adacel) 02/28/2013     Zoster vaccine, live 04/09/2015       ASSESSMENT:                 Current medications were reviewed today.      Medication Adherence: excellent, no issues identified    Diabetes: Needs Improvement. Patient is not meeting A1c goal of < 7-8%. Self monitoring of blood glucose is not at goal of fasting  mg/dL and post prandial < 150 mg/dL. Pt would benefit from minimum SMBG: Check blood sugars fasting, and occasionally 2 hours after starting a meal.  SFU (Glipizide) :  stay on the same dose.  Increase in home glucose monitoring--see plan for details. Cannot say for sure if he needs insulin(s) today or not ?     Hypertension: Stable. Patient is meeting BP goal of < 140/90mmHg.   Pt would benefit from the following changes - continued BP monitoring and calling cardiologist and let them know he is off arb x 2 weeks due to low bp's --should he be back on low dose ?        PLAN:                  1. FYI--Please contact your cardiologist office --let them know home BP's are low and you stopped the 12.5mg tab of Losartan . But you did not have any overt side effects to the low BP's.    You have severe heart failure --losartan is a good pill for that . Maybe on dialysis days you dont need losartan on those days. Perhaps the other 4 days of week you should be on it ?     FYI--your BP today in clinic was 91/63mmhg ;pulse 115.     2. FYI-- Your A1c (diabetes lab was too high at 8.6%).   --lets have you check blood sugars 2 x day --First one before breakfast --Gola then is  (today =113) , then check 2 hours after any main meal --Goal - then is for BS to be < 150.    Due to your severe heart failure --we need  to be cautious about avoiding any blood sugars <80.     We can look at a pattern of this and decide whether you need more glipizide or a 24 hour insulin.         Next MTM visit: Please send me a [employer] message in 1 week with blood sugar readings -then we can decide if you need any insulin ?   Se me in clinic in 1 month -April 4th at 11;30am --bring blood sugar meter.      I spent 50 minutes with this patient today. All changes were made via collaborative practice agreement with Yahir Turcios. A copy of the visit note was provided to the patient's primary care provider.      The patient was given a summary of these recommendations as an after visit summary.    Tono Clay [facility].  Medication Therapy Management Provider  [phone]

## 2018-03-07 NOTE — MR AVS SNAPSHOT
After Visit Summary   3/7/2018    Murray Nicholson    MRN: [id number]           Patient Information     Date Of Birth          1955        Visit Information        Provider Department      3/7/2018 2:00 PM Tono Clay [facility] MTM        Today's Diagnoses     Type 2 diabetes mellitus with stage 5 chronic kidney disease not on chronic dialysis, without long-term current use of insulin (H)    -  1      Care Instructions    Recommendations from today's MTM visit:                                                    MTM (medication therapy management) is a service provided by a clinical pharmacist designed to help you get the most of out of your medicines.   Today we reviewed what your medicines are for, how to know if they are working, that your medicines are safe and how to make your medicine regimen as easy as possible.     1. FYI--Please contact your cardiologist office --let them know home BP's are low and you stopped the 12.5mg tab of Losartan . But you did not have any overt side effects to the low BP's.    You have severe heart failure --losartan is a good pill for that . Maybe on dialysis days you dont need losartan on those days. Perhaps the other 4 days of week you should be on it ?     FYI--your BP today in clinic was 91/63mmhg ;pulse 115.     2. FYI-- Your A1c (diabetes lab was too high at 8.6%).   --lets have you check blood sugars 2 x day --First one before breakfast --[place] then is  (today =113) , then check 2 hours after any main meal --Goal - then is for BS to be < 150.    Due to your severe heart failure --we need to be cautious about avoiding any blood sugars <80.     We can look at a pattern of this and decide whether you need more glipizide or a 24 hour insulin.         Next MTM visit: Please send me a [employer] message in 1 week with blood sugar readings -then we can decide if you need any insulin ?   Se me in clinic in 1 month -April 4th at 11;30am  --bring blood sugar meter.    To schedule another MT appointment, please call the clinic directly or you may call the MTM scheduling line at [phone] or toll-free at [phone].     My Clinical Pharmacist's contact information:                                                      It was a pleasure seeing you today!  Please feel free to contact me with any questions or concerns you have.      Tono Clay Rph.  Medication Therapy Management Provider  [phone]    You may receive a survey about the MT services you received.  I would appreciate your feedback to help me serve you better in the future. Please fill it out and return it when you can. Your comments will be anonymous.                  Follow-ups after your visit        Your next 10 appointments already scheduled     Mar 12, 2018  1:00 PM CDT   (Arrive by 12:45 PM)   NEW HEART FAILURE with Klever Ernst MD   [facility] ([facility])    [address]  [address]  [facility] [id number] [phone]            Mar 16, 2018  9:30 AM CDT   (Arrive by 9:15 AM)   Return Visit with Darvin Tang MD   [facility] Rheumatology ([facility])    [address]  [address]  [facility] [id number] [phone]            Mar 28, 2018 10:30 AM CDT   Cardiac Evaluation with  Cardiac Rehab 1   [facility] ([facility])    [address]st [address]  [facility] [address] [phone]            Apr 04, 2018 11:30 AM CDT   SHORT with Tono Clay RPH   [facility] MT ([facility])    [address] [place] [address] A  [place] [id number] [phone]              Who to contact     If you have questions or need follow up information about today's clinic visit or your schedule please contact [facility] directly at  [phone].  Normal or non-critical lab and imaging results will be communicated to you by [employer], letter or phone within 4 business days after the clinic has received the results. If you do not hear from us within 7 days, please contact the clinic through [employer] or phone. If you have a critical or abnormal lab result, we will notify you by phone as soon as possible.  Submit refill requests through [employer] or call your pharmacy and they will forward the refill request to us. Please allow 3 business days for your refill to be completed.          Additional Information About Your Visit        [employer] Information     [employer] gives you secure access to your electronic health record. If you see a primary care provider, you can also send messages to your care team and make appointments. If you have questions, please call your primary care clinic.  If you do not have a primary care provider, please call [phone] and they will assist you.        Care EveryWhere ID     This is your Care EveryWhere ID. This could be used by other organizations to access your [facility] medical records  [id number]        Your Vitals Were     Pulse Pulse Oximetry BMI (Body Mass Index)             115 99% 24.81 kg/m2          Blood Pressure from Last 3 Encounters:   03/07/18 91/63   02/23/18 99/58   02/21/18 (!) 87/54    Weight from Last 3 Encounters:   03/07/18 168 lb (76.2 kg)   02/23/18 168 lb 3.2 oz (76.3 kg)   02/21/18 169 lb 1.6 oz (76.7 kg)              Today, you had the following     No orders found for display         Today's Medication Changes          These changes are accurate as of 3/7/18  3:02 PM.  If you have any questions, ask your nurse or doctor.               Start taking these medicines.        Dose/Directions    blood glucose monitoring test strip   Commonly known as:  no brand specified   Used for:  Type 2 diabetes mellitus with stage 5 chronic kidney disease not on chronic dialysis, without long-term current use  of insulin (H)   Started by:  Tono Clay RPH        Use to test blood sugar 2-3 times daily or as directed.   Quantity:  100 each   Refills:  11            Where to get your medicines      These medications were sent to [id number] - [place], [place] - [address] AT [facility] & [address], [place] [address]     Phone:  [phone]     blood glucose monitoring test strip                Primary Care Provider Office Phone # Fax #    Yahir Turcios [phone] [address]  [facility] [address]        Equal Access to Services     [facility] ANALISA : Hadii aad ku hadasho Soomaali, waaxda luqadaha, qaybta kaalmada adeegyada, waxay idiin hayaan gavino palencia . So [facility] [phone].    ATENCIÓN: Si habla español, tiene a ortiz disposición servicios gratuitos de asistencia lingüística. [facility] [phone].    We comply with applicable federal civil rights laws and [place] laws. We do not discriminate on the basis of race, color, national origin, age, disability, sex, sexual orientation, or gender identity.            Thank you!     Thank you for choosing [facility]  for your care. Our goal is always to provide you with excellent care. Hearing back from our patients is one way we can continue to improve our services. Please take a few minutes to complete the written survey that you may receive in the mail after your visit with us. Thank you!             Your Updated Medication List - Protect others around you: Learn how to safely use, store and throw away your medicines at www.disposemymeds.org.          This list is accurate as of 3/7/18  3:02 PM.  Always use your most recent med list.                   Brand Name Dispense Instructions for use Diagnosis    ACETAMINOPHEN PO      Take 500-1,000 mg by mouth nightly as needed for pain        albuterol 108 (90 BASE) MCG/ACT Inhaler    PROAIR HFA/PROVENTIL HFA/VENTOLIN HFA    1  Inhaler    Inhale 2 puffs into the lungs every 6 hours as needed for shortness of breath / dyspnea or wheezing    Cough       * allopurinol 100 MG tablet    ZYLOPRIM    30 tablet    Take 1 tablet (100 mg) by mouth daily Take with 300 mg tabs for 400 mg /day total.    Chronic gout of wrist, unspecified cause, unspecified laterality, Gout, unspecified cause, unspecified chronicity, unspecified site       * allopurinol 300 MG tablet    ZYLOPRIM    30 tablet    Take 1 tablet (300 mg) by mouth daily Take with 100 mg tabs for 400 mg /day total.    Chronic gout of wrist, unspecified cause, unspecified laterality, Gout, unspecified cause, unspecified chronicity, unspecified site       aspirin 81 MG tablet      Take 1 tablet (81 mg) by mouth at bedtime        atorvastatin 40 MG tablet    LIPITOR    90 tablet    Take 1 tablet (40 mg) by mouth daily    CKD (chronic kidney disease) stage 3, GFR 30-59 ml/min, Hyperlipidemia LDL goal <100       B-COMPLEX PO      Take 1 tablet by mouth daily        bismuth subsalicylate 262 MG/15ML suspension    PEPTO BISMOL     Take 15 mLs by mouth every 6 hours as needed for indigestion        blood glucose monitoring test strip    no brand specified    100 each    Use to test blood sugar 2-3 times daily or as directed.    Type 2 diabetes mellitus with stage 5 chronic kidney disease not on chronic dialysis, without long-term current use of insulin (H)       calcium acetate (Phos Binder) 667 MG Tabs     180 tablet    Take 1 tablet by mouth 3 times daily (with meals)    Hyperphosphatemia       fluticasone 50 MCG/ACT spray    FLONASE    16 g    Spray 1-2 sprays into both nostrils daily    Nasal congestion       glipiZIDE 5 MG tablet    GLUCOTROL     Take 5 mg by mouth daily (with breakfast)        loratadine 10 MG tablet    CLARITIN     Take 10 mg by mouth daily as needed Reported on 5/3/2017    Hypertensive cardiopathy, SOB (shortness of breath)       losartan 25 MG tablet    COZAAR    30 tablet     Take 1/2 tab (12.5 mg) daily    Systolic heart failure, unspecified heart failure chronicity (H)       midodrine HCl 10 MG Tabs     90 tablet    Take 1 tablet by mouth three times a week    Hemodialysis-associated hypotension       omeprazole 20 MG CR capsule    priLOSEC     Take 20 mg by mouth daily At night        order for DME     1 Units    Equipment being ordered: Commode () Treatment Diagnosis: ESRD on PD Pt has to be connected to PD all night and can not be disconnected, hence impending his mobility to go to the bathroom. At risk for infection if he does not have this equipment.    CKD (chronic kidney disease) stage 5, GFR less than 15 ml/min (H)       predniSONE 5 MG tablet    DELTASONE    90 tablet    Take 1 tablet (5 mg) by mouth daily    Gout, unspecified cause, unspecified chronicity, unspecified site, Chronic gout of wrist, unspecified cause, unspecified laterality       traMADol 50 MG tablet    ULTRAM    50 tablet    Take 1 tablet (50 mg) by mouth every 6 hours as needed for moderate pain    Abdominal pain, generalized       * Notice:  This list has 2 medication(s) that are the same as other medications prescribed for you. Read the directions carefully, and ask your doctor or other care provider to review them with you.

## 2018-03-09 ENCOUNTER — PRE VISIT (OUTPATIENT)
Dept: CARDIOLOGY | Facility: CLINIC | Age: 63
End: 2018-03-09

## 2018-03-09 DIAGNOSIS — I50.22 CHRONIC SYSTOLIC CONGESTIVE HEART FAILURE (H): Primary | ICD-10-CM

## 2018-03-12 ENCOUNTER — DOCUMENTATION ONLY (OUTPATIENT)
Dept: TRANSPLANT | Facility: CLINIC | Age: 63
End: 2018-03-12

## 2018-03-12 ENCOUNTER — OFFICE VISIT (OUTPATIENT)
Dept: CARDIOLOGY | Facility: CLINIC | Age: 63
End: 2018-03-12
Attending: INTERNAL MEDICINE
Payer: COMMERCIAL

## 2018-03-12 VITALS
HEART RATE: 115 BPM | SYSTOLIC BLOOD PRESSURE: 97 MMHG | DIASTOLIC BLOOD PRESSURE: 64 MMHG | WEIGHT: 172 LBS | OXYGEN SATURATION: 100 % | HEIGHT: 69 IN | BODY MASS INDEX: 25.48 KG/M2

## 2018-03-12 DIAGNOSIS — I50.20 SYSTOLIC HEART FAILURE, UNSPECIFIED HEART FAILURE CHRONICITY: ICD-10-CM

## 2018-03-12 PROCEDURE — G0463 HOSPITAL OUTPT CLINIC VISIT: HCPCS | Mod: ZF

## 2018-03-12 PROCEDURE — 99203 OFFICE O/P NEW LOW 30 MIN: CPT | Mod: ZP | Performed by: INTERNAL MEDICINE

## 2018-03-12 ASSESSMENT — PAIN SCALES - GENERAL: PAINLEVEL: NO PAIN (0)

## 2018-03-12 NOTE — LETTER
3/12/2018      RE: Murray Nicholson  [address]  [place] [address]       Dear Tres Turcios and David,    The had the pleasure of seeing Mr. Gao had her into her advanced heart failure and transplant clinic continues to [facility].  Although you are familiar with his history please allow me to summarize it for the purpose of our records.    Mr. Haines is a very pleasant 63-year-old gentleman was referred to us for consideration of advanced heart failure option.  He was recently hospitalized after noted to have severe reduction in his ejection fraction with severely elevated biventricular filling pressures and low cardiac index.  This was noted during her elective coronary angiogram to evaluate his coronary arteries.  He was hemodialyzed aggressively in the hospital to reduce his filling pressures.  However, due to hypotension, he was not able to tolerate any of his afterload reduction therapy.  He was discharged on a low-dose of Cozaar 25 mg daily.  He could not even tolerate this dose.  In fact he had to use Midrin prior to his dialysis to remove volume.  In light of this progressively worsening left heart dysfunction he was referred to us to be considered for a heart kidney transplantation.    Of note his past medical history is significant for type 2 diabetes mellitus for which he is on glipizide for the last 15 years.  He also has a history of hypertension hyperlipidemia.  He was diagnosed with bicuspid aortic valve and aortopathy that was diagnosed approximately 5 years ago.  He was initially following up with Dr. nadege michelle after his departure with Dr. Montrell Posada.  He has been known to have aortic regurgitation which has been closely followed.  His last transesophageal echocardiogram from November 2016 showed moderate aortic regurgitation with an estimated ejection fraction of 35-40%.  At that time his aortic regurgitation was considered to be not severe enough to explain  his LV systolic dysfunction.    Mr. Nicholson has chronic kidney disease from his long-standing diabetes mellitus.  He has been on hemodialysis since fall 2017.  He was initially considered for a kidney transplantation however due to noncompliance was taken off the list.  He was hospitalized in January 2018 for a peritoneal infection from his PD catheter.  He has been on hemodialysis since January 2018 following his peritonitis.  He has had more hypotension with hemodialysis.    Mr. Haines states that he has had low energy for a long time dating as far as 10 years ago.  He has been hospitalized twice in the last 5 years for what appears to be CHF exacerbation with shortness of breath PND and orthopnea.  He has been having progressively worsening exertional shortness of breath in the last year or so.  Is currently not able to do more than his usual activity.  He has to stop before he can walk more than a block.  Because these episodes as panic episodes which appeared to be more of exertional shortness of breath.  He has not had any lightheadedness dizziness or syncope.  He denies having any chest pain or chest pressure.  He has no lower extremity swelling or abdominal distention.  Lately they have been having difficulty dialyzing him due to hypotension and he is requiring prior to his dialysis.  I would currently categorize him as NYHA functional class IIIb.    Past medical history    #1 type 2 diabetes mellitus next number  2.  Hyperlipidemia  3.  Hypertension  4.  Bicuspid aortic valve with aortic regurgitation  5.  Chronic kidney disease  6.  Gout  7.  End-stage renal disease requiring dialysis -initially on PD now on HD    Past surgical history  #1 hernia repair  #2 PD catheter placement    Medications  Current Outpatient Prescriptions   Medication Sig     blood glucose monitoring (NO BRAND SPECIFIED) test strip Use to test blood sugar 2-3 times daily or as directed.     losartan (COZAAR) 25 MG tablet Take 1/2 tab  (12.5 mg) daily     midodrine HCl 10 MG TABS Take 1 tablet by mouth three times a week     traMADol (ULTRAM) 50 MG tablet Take 1 tablet (50 mg) by mouth every 6 hours as needed for moderate pain     B Complex-Biotin-FA (B-COMPLEX PO) Take 1 tablet by mouth daily     bismuth subsalicylate (PEPTO BISMOL) 262 MG/15ML suspension Take 15 mLs by mouth every 6 hours as needed for indigestion     calcium acetate, Phos Binder, 667 MG TABS Take 1 tablet by mouth 3 times daily (with meals)     albuterol (PROAIR HFA/PROVENTIL HFA/VENTOLIN HFA) 108 (90 BASE) MCG/ACT Inhaler Inhale 2 puffs into the lungs every 6 hours as needed for shortness of breath / dyspnea or wheezing     fluticasone (FLONASE) 50 MCG/ACT spray Spray 1-2 sprays into both nostrils daily     atorvastatin (LIPITOR) 40 MG tablet Take 1 tablet (40 mg) by mouth daily     omeprazole (PRILOSEC) 20 MG CR capsule Take 20 mg by mouth daily At night     loratadine (CLARITIN) 10 MG tablet Take 10 mg by mouth daily as needed Reported on 5/3/2017     ASPIRIN 81 MG OR TABS Take 1 tablet (81 mg) by mouth at bedtime     blood glucose monitoring (ACCU-CHEK FASTCLIX) lancets Use to test blood sugar 2-3 times daily or as directed.  Ok to substitute alternative if insurance prefers.     glipiZIDE (GLUCOTROL) 5 MG tablet Take 1 tablet (5 mg) by mouth daily (with breakfast)     allopurinol (ZYLOPRIM) 300 MG tablet Take 1 tablet (300 mg) by mouth daily     order for DME Equipment being ordered: Carol ()  Treatment Diagnosis: ESRD on PD  Pt has to be connected to PD all night and can not be disconnected, hence impending his mobility to go to the bathroom. At risk for infection if he does not have this equipment.     No current facility-administered medications for this visit.      Review of system:  A detailed 10 point review of system obtained as described in history of present illness all other systems reviewed and are negative    Personal history:  He quit smoking he drinks  alcohol occasionally he used to smoke marijuana however his quit this now.  He denies using cocaine and heroin or any other illicit drugs.    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  He is .  His 2 grown adult children arrangements involved in his care lately and are willing to help.    Family history:  No significant family history of premature coronary artery disease sudden cardiac death or pulmonary hypertension.    Examination:  He was in no apparent distress.  His blood pressure was 97/64, pulse rate was 115, respiratory rate was 16 and oxygen saturation was 98% on room air.  His weight was 78 kg.  He had no jugular venous distention.  He had no paler cyanosis or jaundice.  His carotids were 1+ bilaterally.  His pulse was regular rate and rhythm.  Cardiac auscultation revealed a normal S1 soft S2 and an early diastolic murmur in the aortic area.   Auscultation of his lungs reveal equal air entry on both sides with no added sounds.  His abdomen was soft with normal bowel sounds no tenderness no rigidity no guarding. He had no focal neurological deficit.  His extremities showed no edema.    EKG:    Sinus tachycardia, left axis deviation, and left ventricular hypertrophy with strain pattern.    Echocardiogram:  His last echocardiogram from February showed severely reduced left ventricular ejection fraction with an estimated EF of 10-15%.  He had severe left ventricular dilatation with left ventricular end-diastolic diameter of 7.9 cm.  His right ventricle is moderately reduced in function.  He had a bicuspid aortic valve.  He had moderate-severe eccentric aortic regurgitation. He had moderate mitral insufficiency.  His right ventricular systolic pressure was elevated at 31 mmHg above the right atrial pressure.  His IVC was normal in size.  His sinotubular ridge was effaced.  His aortic ascending aorta was  dilated at 4.2 cm.  Compared to his prior study his aortic regurgitation the LV RV function and LV dilatation had very worsened.      Coronary angiogram:  Mild nonobstructive disease.  He had a mid 50% RCA lesion with an FFR of 0.89.  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    Right heart catheterization:  His artery pressure was 1 mmHg.  His RV was 27/1.  His PA was 27/16 with a mean of 22 mmHg.  His pulmonary capillary wedge pressure was 15 mmHg.  His PA saturation was 44.7.  His Kassi cardiac output was 3.1 with an index of 1.6.  His PVR was 3.  This was after aggressive hemodialysis in the hospital.    CBC RESULTS:   Recent Labs   Lab Test  03/07/18   0833   WBC  6.9   RBC  3.41*   HGB  11.1*   HCT  34.7*   MCV  102*   MCH  32.6   MCHC  32.0   RDW  18.1*   PLT  257     Recent Labs   Lab Test  03/22/18   1330  03/15/18   1244   NA  135  137   POTASSIUM  3.3*  3.9   CHLORIDE  103  103   CO2  19*  23   ANIONGAP  13  12   GLC  135*  116*   BUN  24  21   CR  3.94*  4.09*   TRINI  9.0  8.8     Liver Function Studies -   Recent Labs   Lab Test  03/07/18   0833  02/19/18   1558   PROTTOTAL   --   7.2   ALBUMIN   --   2.7*   BILITOTAL  0.7  0.6   ALKPHOS  129  102   AST  18  18   ALT  21  15     Assessment and Plan:    Mr. Nicholson is a very pleasant 63-year-old gentleman with past medical history significant for hypertension hyperlipidemia and type 2 diabetes mellitus, end-stage renal disease requiring hemodialysis, bicuspid aortic valve, moderate to severe aortic regurgitation severe LV systolic dysfunction.    He has been having progressively worsening LV systolic dysfunction.  His ejection fraction is now 10-15%.  He is more symptomatic.  He is functional class IIIB.  He has been having hypotension with inability to complete his dialysis requiring midodrine prior to dialysis.  His volume status has not been an issue due to aggressive dialysis.      The etiology of the LV systolic  dysfunction is unclear, however, I suspect that this is very likely secondary to his aortic valve disease.  His aortic regurgitation is eccentric and it is very difficult to assess the severity of his aortic regurgitation accurately. He does not have coronary artery disease to explain his progressively worsening LV systolic dysfunction.  His blood pressure has been lately been well controlled.      In light of this I requested him to have a repeat transesophageal echocardiogram.  I personally reviewed his echocardiogram and discussed this with Dr. Mccarthy.  Now, he has severe aortic regurgitation which is eccentric in nature.  This has worsened when compared to November 2016.    Thus I suspect that his worsening LV systolic dysfunction is very likely due to his aortic regurgitation.  Although his cardiac index is low in patients with aortic valve disease, in general, it improve with aortic valve replacement.  Before considering him for cardiac transplant I think I would be important to consider this option. I have discussed his case with Dr. Caputo, who is willing to see him in the next 1-2 weeks.  We will arrange for him to have a repeat right heart catheterization.  He will likely have an LVAD as a backup strategy as this is a high risk surgery with severely reduced ejection fraction and cardiac index.  I discussed the plan of care with the patient was also agreeable.  We will aim for aortic valve replacement with a balloon pump and hopefully he will come off the surgery with no difficulties.  If he were to have difficulties he will need an LVAD as a bridge to transplant.    It was pleasure meeting Mr. Julita bonilla in the advanced of the lung transplant clinic.  We thank you for involving us in his care.  We will keep you apprised of his treatment plan and progress.    Sincerely,  Klever Ernst MD   [facility]  Heart Failure, Transplant, and Mechanical Circulatory Support Cardiology    Cardiovascular Division  [facility]   [phone]          Klever Ernst MD

## 2018-03-12 NOTE — LETTER
3/12/2018      RE: Murray Nicholson  [address]  [place] [address]       Dear Colleague,    Thank you for the opportunity to participate in the care of your patient, Murray Nicholson, at the [facility]. Please see a copy of my visit note below.    Dear Tres Turcios and Vida,    The had the pleasure of seeing Mr. Gao had her into her advanced heart failure and transplant clinic continues to [facility].  Although you are familiar with his history please allow me to summarize it for the purpose of our records.    Mr. Haines is a very pleasant 63-year-old gentleman was referred to us for consideration of advanced heart failure option.  He was recently hospitalized after noted to have severe reduction in his ejection fraction with severely elevated biventricular filling pressures and low cardiac index.  This was noted during her elective coronary angiogram to evaluate his coronary arteries.  He was hemodialyzed aggressively in the hospital to reduce his filling pressures.  However, due to hypotension, he was not able to tolerate any of his afterload reduction therapy.  He was discharged on a low-dose of Cozaar 25 mg daily.  He could not even tolerate this dose.  In fact he had to use Midrin prior to his dialysis to remove volume.  In light of this progressively worsening left heart dysfunction he was referred to us to be considered for a heart kidney transplantation.    Of note his past medical history is significant for type 2 diabetes mellitus for which he is on glipizide for the last 15 years.  He also has a history of hypertension hyperlipidemia.  He was diagnosed with bicuspid aortic valve and aortopathy that was diagnosed approximately 5 years ago.  He was initially following up with Dr. nadege michelle after his departure with Dr. Montrell Posada.  He has been known to have aortic regurgitation which has been closely followed.   His last transesophageal echocardiogram from November 2016 showed moderate aortic regurgitation with an estimated ejection fraction of 35-40%.  At that time his aortic regurgitation was considered to be not severe enough to explain his LV systolic dysfunction.    Mr. Nicholson has chronic kidney disease from his long-standing diabetes mellitus.  He has been on hemodialysis since fall 2017.  He was initially considered for a kidney transplantation however due to noncompliance was taken off the list.  He was hospitalized in January 2018 for a peritoneal infection from his PD catheter.  He has been on hemodialysis since January 2018 following his peritonitis.  He has had more hypotension with hemodialysis.    Mr. Haines states that he has had low energy for a long time dating as far as 10 years ago.  He has been hospitalized twice in the last 5 years for what appears to be CHF exacerbation with shortness of breath PND and orthopnea.  He has been having progressively worsening exertional shortness of breath in the last year or so.  Is currently not able to do more than his usual activity.  He has to stop before he can walk more than a block.  Because these episodes as panic episodes which appeared to be more of exertional shortness of breath.  He has not had any lightheadedness dizziness or syncope.  He denies having any chest pain or chest pressure.  He has no lower extremity swelling or abdominal distention.  Lately they have been having difficulty dialyzing him due to hypotension and he is requiring prior to his dialysis.  I would currently categorize him as NYHA functional class IIIb.    Past medical history    #1 type 2 diabetes mellitus next number  2.  Hyperlipidemia  3.  Hypertension  4.  Bicuspid aortic valve with aortic regurgitation  5.  Chronic kidney disease  6.  Gout  7.  End-stage renal disease requiring dialysis -initially on PD now on HD    Past surgical history  #1 hernia repair  #2 PD catheter  placement    Medications  Current Outpatient Prescriptions   Medication Sig     blood glucose monitoring (NO BRAND SPECIFIED) test strip Use to test blood sugar 2-3 times daily or as directed.     losartan (COZAAR) 25 MG tablet Take 1/2 tab (12.5 mg) daily     midodrine HCl 10 MG TABS Take 1 tablet by mouth three times a week     traMADol (ULTRAM) 50 MG tablet Take 1 tablet (50 mg) by mouth every 6 hours as needed for moderate pain     B Complex-Biotin-FA (B-COMPLEX PO) Take 1 tablet by mouth daily     bismuth subsalicylate (PEPTO BISMOL) 262 MG/15ML suspension Take 15 mLs by mouth every 6 hours as needed for indigestion     calcium acetate, Phos Binder, 667 MG TABS Take 1 tablet by mouth 3 times daily (with meals)     albuterol (PROAIR HFA/PROVENTIL HFA/VENTOLIN HFA) 108 (90 BASE) MCG/ACT Inhaler Inhale 2 puffs into the lungs every 6 hours as needed for shortness of breath / dyspnea or wheezing     fluticasone (FLONASE) 50 MCG/ACT spray Spray 1-2 sprays into both nostrils daily     atorvastatin (LIPITOR) 40 MG tablet Take 1 tablet (40 mg) by mouth daily     omeprazole (PRILOSEC) 20 MG CR capsule Take 20 mg by mouth daily At night     loratadine (CLARITIN) 10 MG tablet Take 10 mg by mouth daily as needed Reported on 5/3/2017     ASPIRIN 81 MG OR TABS Take 1 tablet (81 mg) by mouth at bedtime     blood glucose monitoring (ACCU-CHEK FASTCLIX) lancets Use to test blood sugar 2-3 times daily or as directed.  Ok to substitute alternative if insurance prefers.     glipiZIDE (GLUCOTROL) 5 MG tablet Take 1 tablet (5 mg) by mouth daily (with breakfast)     allopurinol (ZYLOPRIM) 300 MG tablet Take 1 tablet (300 mg) by mouth daily     order for DME Equipment being ordered: [employer] ()  Treatment Diagnosis: ESRD on PD  Pt has to be connected to PD all night and can not be disconnected, hence impending his mobility to go to the bathroom. At risk for infection if he does not have this equipment.     No current  facility-administered medications for this visit.      Review of system:  A detailed 10 point review of system obtained as described in history of present illness all other systems reviewed and are negative    Personal history:  He quit smoking he drinks alcohol occasionally he used to smoke marijuana however his quit this now.  He denies using cocaine and heroin or any other illicit drugs.    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  He is .  His 2 grown adult children arrangements involved in his care lately and are willing to help.    Family history:  No significant family history of premature coronary artery disease sudden cardiac death or pulmonary hypertension.    Examination:  He was in no apparent distress.  His blood pressure was 97/64, pulse rate was 115, respiratory rate was 16 and oxygen saturation was 98% on room air.  His weight was 78 kg.  He had no jugular venous distention.  He had no paler cyanosis or jaundice.  His carotids were 1+ bilaterally.  His pulse was regular rate and rhythm.  Cardiac auscultation revealed a normal S1 soft S2 and an early diastolic murmur in the aortic area.   Auscultation of his lungs reveal equal air entry on both sides with no added sounds.  His abdomen was soft with normal bowel sounds no tenderness no rigidity no guarding. He had no focal neurological deficit.  His extremities showed no edema.    EKG:    Sinus tachycardia, left axis deviation, and left ventricular hypertrophy with strain pattern.    Echocardiogram:  His last echocardiogram from February showed severely reduced left ventricular ejection fraction with an estimated EF of 10-15%.  He had severe left ventricular dilatation with left ventricular end-diastolic diameter of 7.9 cm.  His right ventricle is moderately reduced in function.  He had a bicuspid aortic valve.  He had moderate-severe eccentric  aortic regurgitation. He had moderate mitral insufficiency.  His right ventricular systolic pressure was elevated at 31 mmHg above the right atrial pressure.  His IVC was normal in size.  His sinotubular ridge was effaced.  His aortic ascending aorta was dilated at 4.2 cm.  Compared to his prior study his aortic regurgitation the LV RV function and LV dilatation had very worsened.      Coronary angiogram:  Mild nonobstructive disease.  He had a mid 50% RCA lesion with an FFR of 0.89.  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    Right heart catheterization:  His artery pressure was 1 mmHg.  His RV was 27/1.  His PA was 27/16 with a mean of 22 mmHg.  His pulmonary capillary wedge pressure was 15 mmHg.  His PA saturation was 44.7.  His Kassi cardiac output was 3.1 with an index of 1.6.  His PVR was 3.  This was after aggressive hemodialysis in the hospital.    CBC RESULTS:   Recent Labs   Lab Test  03/07/18   0833   WBC  6.9   RBC  3.41*   HGB  11.1*   HCT  34.7*   MCV  102*   MCH  32.6   MCHC  32.0   RDW  18.1*   PLT  257     Recent Labs   Lab Test  03/22/18   1330  03/15/18   1244   NA  135  137   POTASSIUM  3.3*  3.9   CHLORIDE  103  103   CO2  19*  23   ANIONGAP  13  12   GLC  135*  116*   BUN  24  21   CR  3.94*  4.09*   TRINI  9.0  8.8     Liver Function Studies -   Recent Labs   Lab Test  03/07/18   0833  02/19/18   1558   PROTTOTAL   --   7.2   ALBUMIN   --   2.7*   BILITOTAL  0.7  0.6   ALKPHOS  129  102   AST  18  18   ALT  21  15     Assessment and Plan:    Mr. Nicholson is a very pleasant 63-year-old gentleman with past medical history significant for hypertension hyperlipidemia and type 2 diabetes mellitus, end-stage renal disease requiring hemodialysis, bicuspid aortic valve, moderate to severe aortic regurgitation severe LV systolic dysfunction.    He has been having progressively worsening LV systolic dysfunction.  His ejection fraction is now 10-15%.  He is more  symptomatic.  He is functional class IIIB.  He has been having hypotension with inability to complete his dialysis requiring midodrine prior to dialysis.  His volume status has not been an issue due to aggressive dialysis.      The etiology of the LV systolic dysfunction is unclear, however, I suspect that this is very likely secondary to his aortic valve disease.  His aortic regurgitation is eccentric and it is very difficult to assess the severity of his aortic regurgitation accurately. He does not have coronary artery disease to explain his progressively worsening LV systolic dysfunction.  His blood pressure has been lately been well controlled.      In light of this I requested him to have a repeat transesophageal echocardiogram.  I personally reviewed his echocardiogram and discussed this with Dr. Mccarthy.  Now, he has severe aortic regurgitation which is eccentric in nature.  This has worsened when compared to November 2016.    Thus I suspect that his worsening LV systolic dysfunction is very likely due to his aortic regurgitation.  Although his cardiac index is low in patients with aortic valve disease, in general, it improve with aortic valve replacement.  Before considering him for cardiac transplant I think I would be important to consider this option. I have discussed his case with Dr. Caputo, who is willing to see him in the next 1-2 weeks.  We will arrange for him to have a repeat right heart catheterization.  He will likely have an LVAD as a backup strategy as this is a high risk surgery with severely reduced ejection fraction and cardiac index.  I discussed the plan of care with the patient was also agreeable.  We will aim for aortic valve replacement with a balloon pump and hopefully he will come off the surgery with no difficulties.  If he were to have difficulties he will need an LVAD as a bridge to transplant.    It was pleasure meeting Mr. Julita bonilla in the advanced of the lung transplant clinic.  We  thank you for involving us in his care.  We will keep you apprised of his treatment plan and progress.    Sincerely,  Klever Ernst MD   [facility]  Heart Failure, Transplant, and Mechanical Circulatory Support Cardiology   Cardiovascular Division  [facility]   [phone]

## 2018-03-12 NOTE — PROGRESS NOTES
Clinic with Dr Ernst:  Mr Nicholson has been referred for consideration of combined heart and kidney transplant, and today he consulted with Dr Ernst.  He mentioned that he has lost ~ 50 lbs, though he doesn't know if it's due to dialysis or to his heart failure.  He has had several meds discontinued or held on his dialysis days due to hypotension.  He also takes midodrine on dialysis days to support blood pressure.  Currently he is receiving hemodialysis since a bout of peritonitis last June.  There has been some discussion of transitioning him back to peritoneal dialysis.      Dr Ernst recommends doing a formal heart transplant evaluation at this time.  Although the likelihood is small, he will also review images to see whether an aortic valve repair is do-able, in which case he would only need to consider kidney transplant.    Dr Ernst also discussed the need to have a support system in place during the transplant evaluation and transplant hospitalization, who can attend patient education sessions with the pt and provide physical/emotional assistance as needed.   Mr Nicholson has two young adult [place], and we also discussed including siblings or friends if available.      Plan:  Outpt evaluation, but avoiding dialysis days (Tues., Thurs., and Sat) when patient's fatigue is most bothersome.  He has the phone number to the Transplant Office for questions or concerns in the interim.

## 2018-03-12 NOTE — MR AVS SNAPSHOT
After Visit Summary   3/12/2018    Murray Nicholson    MRN: [id number]           Patient Information     Date Of Birth          1955        Visit Information        Provider Department      3/12/2018 1:00 PM Klever Ernst MD [facility]        Today's Diagnoses     Systolic heart failure, unspecified heart failure chronicity (H)           Follow-ups after your visit        Your next 10 appointments already scheduled     Apr 02, 2018 10:30 AM CDT   SIX MINUTE WALK with UC PFL C, UC PFL 6 MINUTE WALK 1   [facility] Pulmonary Function Testing ([facility])    [address]  3rd Floor  [facility] [address]   [phone]            Apr 02, 2018 12:30 PM CDT   (Arrive by 12:15 PM)   New Patient Visit with Patricia Cates PsyD   [facility] Neuropsychology ([facility])    [address]  3rd [facility] [place] [address]   [phone]            Apr 04, 2018 11:30 AM CDT   SHORT with Tono [facility] ([facility])    [address] [place] [address]  [address]  [place] [address]   [phone]            Apr 05, 2018  2:30 PM CDT   (Arrive by 2:15 PM)   TAVR Teddy with Rony Caputo MD   [facility] ([facility])    [address] [place] [address]  [address]  [facility] [address]   [phone]            Apr 09, 2018 10:30 AM CDT   Cardiac Evaluation with  Cardiac Rehab 1   [facility] [place], Cardiac Rehabilitation ([facility])    [address] 1st [address]  [facility] [address] [phone]            Apr 16, 2018  9:00 AM CDT   US CAROTID BILATERAL with UCUSV2   [facility] US ([facility])    [address]  1st Floor  [facility] [address] [phone]           Please bring a list of your medicines (including  vitamins, minerals and over-the-counter drugs). Also, tell your doctor about any allergies you may have. Wear comfortable clothes and leave your valuables at home.  You do not need to do anything special to prepare for your exam.  Please call the Imaging Department at your exam site with any questions.            Apr 16, 2018 10:00 AM CDT   US LOWER EXTREMITY ARTERIAL DUPLEX BILATERAL with UCUSV2   [facility] US ([facility])    [address] [place] [id number] [phone]           Please bring a list of your medicines (including vitamins, minerals and over-the-counter drugs). Also, tell your doctor about any allergies you may have. Wear comfortable clothes and leave your valuables at home.  You do not need to do anything special to prepare for your exam.  Please call the Imaging Department at your exam site with any questions.            Apr 16, 2018 11:00 AM CDT   US ANDRE DOPPLER NO EXERCISE 1-2 LVLS BILAT with UCUSV2   [facility] US ([facility])    [address] [place] [id number] [phone]           Please bring a list of your medicines (including vitamins, minerals and over-the-counter drugs). Also, tell your doctor about any allergies you may have. Wear comfortable clothes and leave your valuables at home.  No caffeine or tobacco for 1 hour prior to exam.  Please call the Imaging Department at your exam site with any questions.            Apr 16, 2018  1:15 PM CDT   (Arrive by 1:00 PM)   XR CHEST 2 VIEWS with UCXR1   [facility] Xray ([facility])    [address] [address] [phone]           Please bring a list of your current medicines to your exam. (Include vitamins, minerals and over-thecounter medicines.) Leave your valuables at home.  Tell your doctor if there is a chance you may be pregnant.   "You do not need to do anything special for this exam.              Who to contact     If you have questions or need follow up information about today's clinic visit or your schedule please contact [facility] directly at [phone].  Normal or non-critical lab and imaging results will be communicated to you by MyChart, letter or phone within 4 business days after the clinic has received the results. If you do not hear from us within 7 days, please contact the clinic through [employer] or phone. If you have a critical or abnormal lab result, we will notify you by phone as soon as possible.  Submit refill requests through [employer] or call your pharmacy and they will forward the refill request to us. Please allow 3 business days for your refill to be completed.          Additional Information About Your Visit        [employer] Information     [employer] gives you secure access to your electronic health record. If you see a primary care provider, you can also send messages to your care team and make appointments. If you have questions, please call your primary care clinic.  If you do not have a primary care provider, please call [phone] and they will assist you.        Care EveryWhere ID     This is your Care EveryWhere ID. This could be used by other organizations to access your [place] medical records  [id number]        Your Vitals Were     Pulse Height Pulse Oximetry BMI (Body Mass Index)          115 1.753 m (5' 9\") 100% 25.4 kg/m2         Blood Pressure from Last 3 Encounters:   03/28/18 106/63   03/22/18 119/73   03/16/18 90/57    Weight from Last 3 Encounters:   03/22/18 74.8 kg (165 lb)   03/16/18 76.4 kg (168 lb 8 oz)   03/12/18 78 kg (172 lb)              We Performed the Following     Follow-Up with Advanced Heart Failure Cardiologist          Today's Medication Changes          These changes are accurate as of 3/12/18 11:59 PM.  If you have any questions, ask your nurse or doctor.               Stop " taking these medicines if you haven't already. Please contact your care team if you have questions.     ACETAMINOPHEN PO   Stopped by:  Klever Ernst MD                    Primary Care Provider Office Phone # Fax #    Yahir Alex Turcios [phone] [address]  [facility] [address]        Equal Access to Services     : Hadii aad ku hadasho Soomaali, waaxda luqadaha, qaybta kaalmada adeegyada, waxay idiin hayaan adeeg khvillash lagabrielan . So [facility] [phone].    ATENCIÓN: Si habla español, tiene a ortiz disposición servicios gratuitos de asistencia lingüística. Ll[facility] [phone].    We comply with applicable federal civil rights laws and [place] laws. We do not discriminate on the basis of race, color, national origin, age, disability, sex, sexual orientation, or gender identity.            Thank you!     Thank you for choosing [facility]  for your care. Our goal is always to provide you with excellent care. Hearing back from our patients is one way we can continue to improve our services. Please take a few minutes to complete the written survey that you may receive in the mail after your visit with us. Thank you!             Your Updated Medication List - Protect others around you: Learn how to safely use, store and throw away your medicines at www.disposemymeds.org.          This list is accurate as of 3/12/18 11:59 PM.  Always use your most recent med list.                   Brand Name Dispense Instructions for use Diagnosis    albuterol 108 (90 BASE) MCG/ACT Inhaler    PROAIR HFA/PROVENTIL HFA/VENTOLIN HFA    1 Inhaler    Inhale 2 puffs into the lungs every 6 hours as needed for shortness of breath / dyspnea or wheezing    Cough       * allopurinol 100 MG tablet    ZYLOPRIM    30 tablet    Take 1 tablet (100 mg) by mouth daily Take with 300 mg tabs for 400 mg /day total.    Chronic gout of wrist, unspecified cause, unspecified laterality, Gout, unspecified cause,  unspecified chronicity, unspecified site       * allopurinol 300 MG tablet    ZYLOPRIM    30 tablet    Take 1 tablet (300 mg) by mouth daily Take with 100 mg tabs for 400 mg /day total.    Chronic gout of wrist, unspecified cause, unspecified laterality, Gout, unspecified cause, unspecified chronicity, unspecified site       aspirin 81 MG tablet      Take 1 tablet (81 mg) by mouth at bedtime        atorvastatin 40 MG tablet    LIPITOR    90 tablet    Take 1 tablet (40 mg) by mouth daily    CKD (chronic kidney disease) stage 3, GFR 30-59 ml/min, Hyperlipidemia LDL goal <100       B-COMPLEX PO      Take 1 tablet by mouth daily        bismuth subsalicylate 262 MG/15ML suspension    PEPTO BISMOL     Take 15 mLs by mouth every 6 hours as needed for indigestion        blood glucose monitoring test strip    no brand specified    100 each    Use to test blood sugar 2-3 times daily or as directed.    Type 2 diabetes mellitus with stage 5 chronic kidney disease not on chronic dialysis, without long-term current use of insulin (H)       calcium acetate (Phos Binder) 667 MG Tabs     180 tablet    Take 1 tablet by mouth 3 times daily (with meals)    Hyperphosphatemia       fluticasone 50 MCG/ACT spray    FLONASE    16 g    Spray 1-2 sprays into both nostrils daily    Nasal congestion       loratadine 10 MG tablet    CLARITIN     Take 10 mg by mouth daily as needed Reported on 5/3/2017    Hypertensive cardiopathy, SOB (shortness of breath)       losartan 25 MG tablet    COZAAR    30 tablet    Take 1/2 tab (12.5 mg) daily    Systolic heart failure, unspecified heart failure chronicity (H)       midodrine HCl 10 MG Tabs     90 tablet    Take 1 tablet by mouth three times a week    Hemodialysis-associated hypotension       omeprazole 20 MG CR capsule    priLOSEC     Take 20 mg by mouth daily At night        order for DME     1 Units    Equipment being ordered: Commode () Treatment Diagnosis: ESRD on PD Pt has to be connected to  PD all night and can not be disconnected, hence impending his mobility to go to the bathroom. At risk for infection if he does not have this equipment.    CKD (chronic kidney disease) stage 5, GFR less than 15 ml/min (H)       predniSONE 5 MG tablet    DELTASONE    90 tablet    Take 1 tablet (5 mg) by mouth daily    Gout, unspecified cause, unspecified chronicity, unspecified site, Chronic gout of wrist, unspecified cause, unspecified laterality       traMADol 50 MG tablet    ULTRAM    50 tablet    Take 1 tablet (50 mg) by mouth every 6 hours as needed for moderate pain    Abdominal pain, generalized       * Notice:  This list has 2 medication(s) that are the same as other medications prescribed for you. Read the directions carefully, and ask your doctor or other care provider to review them with you.

## 2018-03-12 NOTE — NURSING NOTE
Chief Complaint   Patient presents with     New Patient     AHF consult     Vitals were taken and medications were reconciled.     Polly Del Real MA    1:09 PM

## 2018-03-13 DIAGNOSIS — I50.20 SYSTOLIC HEART FAILURE (H): Primary | ICD-10-CM

## 2018-03-13 NOTE — PROGRESS NOTES
Needs MARIANA to eval a possible TAVR candidate:  Dr Ernst has reviewed Mr Bharat's previous echo with Dr Mccarthy, and would like to get a MARIANA to ascertain whether the patient might be a candidate for TAVR, and allow him to postpone the need for transplant.  The patient will be instructed to be NPO x 6 hours, hold his diabetic meds the morning of the procedure, and arrange for a ride after the study due to receiving sedation.

## 2018-03-15 ENCOUNTER — HOSPITAL ENCOUNTER (OUTPATIENT)
Dept: CARDIOLOGY | Facility: CLINIC | Age: 63
Discharge: HOME | End: 2018-03-15
Attending: INTERNAL MEDICINE | Admitting: INTERNAL MEDICINE
Payer: COMMERCIAL

## 2018-03-15 VITALS
OXYGEN SATURATION: 99 % | SYSTOLIC BLOOD PRESSURE: 103 MMHG | TEMPERATURE: 97.7 F | HEART RATE: 105 BPM | DIASTOLIC BLOOD PRESSURE: 69 MMHG | RESPIRATION RATE: 16 BRPM

## 2018-03-15 DIAGNOSIS — I50.20 SYSTOLIC HEART FAILURE (H): ICD-10-CM

## 2018-03-15 LAB
ANION GAP SERPL CALCULATED.3IONS-SCNC: 12 MMOL/L (ref 3–14)
BUN SERPL-MCNC: 21 MG/DL (ref 7–30)
CALCIUM SERPL-MCNC: 8.8 MG/DL (ref 8.5–10.1)
CHLORIDE SERPL-SCNC: 103 MMOL/L (ref 94–109)
CO2 SERPL-SCNC: 23 MMOL/L (ref 20–32)
CREAT SERPL-MCNC: 4.09 MG/DL (ref 0.66–1.25)
GFR SERPL CREATININE-BSD FRML MDRD: 15 ML/MIN/1.7M2
GLUCOSE BLDC GLUCOMTR-MCNC: 113 MG/DL (ref 70–99)
GLUCOSE SERPL-MCNC: 116 MG/DL (ref 70–99)
INR PPP: 1.02 (ref 0.86–1.14)
POTASSIUM SERPL-SCNC: 3.9 MMOL/L (ref 3.4–5.3)
SODIUM SERPL-SCNC: 137 MMOL/L (ref 133–144)

## 2018-03-15 PROCEDURE — 93325 DOPPLER ECHO COLOR FLOW MAPG: CPT

## 2018-03-15 PROCEDURE — 93320 DOPPLER ECHO COMPLETE: CPT | Mod: 26 | Performed by: INTERNAL MEDICINE

## 2018-03-15 PROCEDURE — 27210995 ZZH RX 272: Performed by: INTERNAL MEDICINE

## 2018-03-15 PROCEDURE — 99152 MOD SED SAME PHYS/QHP 5/>YRS: CPT

## 2018-03-15 PROCEDURE — 99153 MOD SED SAME PHYS/QHP EA: CPT

## 2018-03-15 PROCEDURE — 80048 BASIC METABOLIC PNL TOTAL CA: CPT | Performed by: INTERNAL MEDICINE

## 2018-03-15 PROCEDURE — 82962 GLUCOSE BLOOD TEST: CPT

## 2018-03-15 PROCEDURE — 36415 COLL VENOUS BLD VENIPUNCTURE: CPT | Performed by: INTERNAL MEDICINE

## 2018-03-15 PROCEDURE — 93325 DOPPLER ECHO COLOR FLOW MAPG: CPT | Mod: 26 | Performed by: INTERNAL MEDICINE

## 2018-03-15 PROCEDURE — 25000128 H RX IP 250 OP 636: Performed by: INTERNAL MEDICINE

## 2018-03-15 PROCEDURE — 25000125 ZZHC RX 250: Performed by: INTERNAL MEDICINE

## 2018-03-15 PROCEDURE — 93312 ECHO TRANSESOPHAGEAL: CPT | Mod: 26 | Performed by: INTERNAL MEDICINE

## 2018-03-15 PROCEDURE — 85610 PROTHROMBIN TIME: CPT | Performed by: INTERNAL MEDICINE

## 2018-03-15 RX ORDER — FENTANYL CITRATE 50 UG/ML
50-100 INJECTION, SOLUTION INTRAMUSCULAR; INTRAVENOUS
Status: DISCONTINUED | OUTPATIENT
Start: 2018-03-15 | End: 2018-03-16 | Stop reason: HOSPADM

## 2018-03-15 RX ORDER — FENTANYL CITRATE 50 UG/ML
25-50 INJECTION, SOLUTION INTRAMUSCULAR; INTRAVENOUS
Status: DISCONTINUED | OUTPATIENT
Start: 2018-03-15 | End: 2018-03-16 | Stop reason: HOSPADM

## 2018-03-15 RX ORDER — FLUMAZENIL 0.1 MG/ML
0.2 INJECTION, SOLUTION INTRAVENOUS
Status: DISCONTINUED | OUTPATIENT
Start: 2018-03-15 | End: 2018-03-16 | Stop reason: HOSPADM

## 2018-03-15 RX ORDER — SODIUM CHLORIDE 9 MG/ML
INJECTION, SOLUTION INTRAVENOUS CONTINUOUS PRN
Status: DISCONTINUED | OUTPATIENT
Start: 2018-03-15 | End: 2018-03-16 | Stop reason: HOSPADM

## 2018-03-15 RX ORDER — NALOXONE HYDROCHLORIDE 0.4 MG/ML
.1-.4 INJECTION, SOLUTION INTRAMUSCULAR; INTRAVENOUS; SUBCUTANEOUS
Status: DISCONTINUED | OUTPATIENT
Start: 2018-03-15 | End: 2018-03-16 | Stop reason: HOSPADM

## 2018-03-15 RX ORDER — LIDOCAINE 40 MG/G
CREAM TOPICAL
Status: DISCONTINUED | OUTPATIENT
Start: 2018-03-15 | End: 2018-03-16 | Stop reason: HOSPADM

## 2018-03-15 RX ORDER — ACYCLOVIR 200 MG/1
3 CAPSULE ORAL ONCE
Status: COMPLETED | OUTPATIENT
Start: 2018-03-15 | End: 2018-03-15

## 2018-03-15 RX ADMIN — TOPICAL ANESTHETIC 0.5 ML: 200 SPRAY DENTAL; PERIODONTAL at 13:22

## 2018-03-15 RX ADMIN — SODIUM CHLORIDE 150 ML: 9 INJECTION, SOLUTION INTRAVENOUS at 14:30

## 2018-03-15 RX ADMIN — SODIUM CHLORIDE 5 ML: 9 INJECTION, SOLUTION INTRAMUSCULAR; INTRAVENOUS; SUBCUTANEOUS at 14:21

## 2018-03-15 RX ADMIN — FENTANYL CITRATE 100 MCG: 50 INJECTION, SOLUTION INTRAMUSCULAR; INTRAVENOUS at 13:33

## 2018-03-15 RX ADMIN — LIDOCAINE HYDROCHLORIDE 30 ML: 20 SOLUTION ORAL; TOPICAL at 13:21

## 2018-03-15 RX ADMIN — MIDAZOLAM 2 MG: 1 INJECTION INTRAMUSCULAR; INTRAVENOUS at 13:33

## 2018-03-15 NOTE — PROGRESS NOTES
Pt arrived in ECHO department for scheduled MARIANA.   Procedure explained, questions answered and consent signed. Discharge instructions discussed with patient and given written copy.  Pt's throat sprayed at 13:20, therefore pt will not be able to eat or drink until 2 hours after at 15:20. Informed pt of this time and encouraged to start with warm fluids and soft foods.    Pt tolerated procedure well, and was given a total of 100 mcg IV fentanyl and 2 mg IV versed for conscious sedation. Pt denied throat or chest pain after MARIANA complete.   MARIANA probe #[id number] used for procedure and inserted without difficulty.  Pt denied chest pain or any other discomfort after procedure and was D/C home after awake and VSS.  Escorted out to front lobby by staff in w/c to meet pt's ride home.

## 2018-03-15 NOTE — TELEPHONE ENCOUNTER
Has been in to see [facility] Tono VELASQUEZ To discuss.  Nieves Cash RN

## 2018-03-16 ENCOUNTER — OFFICE VISIT (OUTPATIENT)
Dept: RHEUMATOLOGY | Facility: CLINIC | Age: 63
End: 2018-03-16
Attending: INTERNAL MEDICINE
Payer: COMMERCIAL

## 2018-03-16 VITALS
WEIGHT: 168.5 LBS | BODY MASS INDEX: 24.96 KG/M2 | TEMPERATURE: 97.7 F | HEIGHT: 69 IN | HEART RATE: 121 BPM | DIASTOLIC BLOOD PRESSURE: 57 MMHG | SYSTOLIC BLOOD PRESSURE: 90 MMHG

## 2018-03-16 DIAGNOSIS — N18.5 TYPE 2 DIABETES MELLITUS WITH STAGE 5 CHRONIC KIDNEY DISEASE NOT ON CHRONIC DIALYSIS, WITHOUT LONG-TERM CURRENT USE OF INSULIN (H): Primary | ICD-10-CM

## 2018-03-16 DIAGNOSIS — M1A.0390 CHRONIC GOUT OF WRIST, UNSPECIFIED CAUSE, UNSPECIFIED LATERALITY: ICD-10-CM

## 2018-03-16 DIAGNOSIS — I50.20 SYSTOLIC HEART FAILURE (H): Primary | ICD-10-CM

## 2018-03-16 DIAGNOSIS — M10.9 GOUT, UNSPECIFIED CAUSE, UNSPECIFIED CHRONICITY, UNSPECIFIED SITE: ICD-10-CM

## 2018-03-16 DIAGNOSIS — E11.22 TYPE 2 DIABETES MELLITUS WITH STAGE 5 CHRONIC KIDNEY DISEASE NOT ON CHRONIC DIALYSIS, WITHOUT LONG-TERM CURRENT USE OF INSULIN (H): Primary | ICD-10-CM

## 2018-03-16 DIAGNOSIS — Z51.81 ENCOUNTER FOR THERAPEUTIC DRUG MONITORING: ICD-10-CM

## 2018-03-16 PROCEDURE — G0463 HOSPITAL OUTPT CLINIC VISIT: HCPCS | Mod: ZF

## 2018-03-16 RX ORDER — ALLOPURINOL 300 MG/1
300 TABLET ORAL DAILY
Qty: 30 TABLET | Refills: 2 | Status: SHIPPED | OUTPATIENT
Start: 2018-03-16 | End: 2018-07-10

## 2018-03-16 RX ORDER — GLIPIZIDE 5 MG/1
5 TABLET ORAL
Qty: 30 TABLET | Refills: 1 | Status: ON HOLD | OUTPATIENT
Start: 2018-03-16 | End: 2018-07-02

## 2018-03-16 RX ORDER — LIDOCAINE 40 MG/G
CREAM TOPICAL
Status: CANCELLED | OUTPATIENT
Start: 2018-03-16

## 2018-03-16 ASSESSMENT — PAIN SCALES - GENERAL: PAINLEVEL: NO PAIN (0)

## 2018-03-16 NOTE — LETTER
3/16/2018      RE: Murray Nicholson  [address]  [place] [address]       Rheumatology Clinic Visit     Murray Nicholson MRN# [id number]   YOB: 1955 Age: 63 year old     Date of Visit: 03/16/2018  Primary care provider: Yahir Turcios          Assessment and Plan:   #Polyarticular gout, recurrent and involving the hips with dual-energy CT showing MSU deposition bilaterally  #Hyperuricemia  #ESRD on hemodialysis  #DM II     -decrease allopurinol to 300 mg daily  -uric acid goal is < 5.0  -discontinue prednisone to 5 mg daily for flare prophylaxis, could consider colchicine post-HD if having flares and needing prophylaxis, would prefer to minimize prednisone given indwelling HD catheter  -prednisone 40 mg daily for 3 days for gout flare    Seen and discussed with Dr. Shaffer.    Darvin Tang MD  Rheumatology Fellow  [phone]          Active Problem List:     Patient Active Problem List    Diagnosis Date Noted     Heart failure (H) 02/02/2018     Priority: Medium     Peritonitis (H) 01/07/2018     Priority: Medium     Volume overload 11/13/2017     Priority: Medium     Chronic systolic heart failure (H) 07/25/2017     Priority: Medium     Fluid overload 04/08/2017     Priority: Medium     Anemia in stage 5 chronic kidney disease (H) 03/17/2017     Priority: Medium     Anemia, iron deficiency 02/15/2017     Priority: Medium     Type 2 diabetes mellitus with diabetic chronic kidney disease (H) 10/14/2015     Priority: Medium     Hypertension      Priority: Medium     Dyslipidemia      Priority: Medium     MGUS (monoclonal gammopathy of unknown significance)      Priority: Medium     Ascending aortic aneurysm (H)      Priority: Medium     Anemia in chronic renal disease 05/19/2015     Priority: Medium     updating diagnosis code for icd10 cutover       CAD (coronary artery disease) 07/10/2014     Priority: Medium     Bicuspid aortic valve      Priority: Medium     Anemia of chronic  disease 04/12/2013     Priority: Medium     Problem list name updated by automated process. Provider to review and confirm       Tubular adenoma 12/30/2011     Priority: Medium     Hypertensive cardiopathy 08/24/2011     Priority: Medium     Hypertension goal BP (blood pressure) < 130/80 01/25/2011     Priority: Medium     Hyperlipidemia LDL goal <100 10/31/2010     Priority: Medium     Per provider       CKD (chronic kidney disease) stage 5, GFR less than 15 ml/min (H) 02/09/2010     Priority: Medium     CHF (congestive heart failure) (H) 08/20/2008     Priority: Medium     Allergic rhinitis 04/05/2006     Priority: Medium     Problem list name updated by automated process. Provider to review       Hypersomnia with sleep apnea 08/18/2005     Priority: Medium     Problem list name updated by automated process. Provider to review       Esophageal reflux 08/12/2004     Priority: Medium          History of Present Illness:   Murray Nicholson is a 62 year old male with history of CAD and ischemic cardiomyopathy, CKD V planning on starting peritoneal dialysis next month, and DM II who presents for evaluation of gout. He was seen initially in April of 2017 while in the hospital by Dr. Gaston. He had polyarticular gout at that time, however he also had diffuse low back pain radiating into the groin and an MRI of the lumbar spine showed inflammation of the soft tissues and muscles of the right hip. Subsequent hip MRI showed cystic changes in both hips. His uric acid was checked and was 17.9. He was diagnosed with gout clinically because he declined an aspiration. He was started on allopurinol and prednisone with prompt resolution of his symptoms. His CK was normal. He also reported a 1.5-2.0 year history of intermittent episodes of podagra and acute gout of his ankles. He has not had a kidney biopsy.     He denies pain in his hands, elbows, or shoulders. He also denies lumps, bumps, or deposits consistent with tophi. He does  not have morning stiffness. Since being seen last he has been taking allopurinol 100 mg daily without trouble. He takes 10 mg of prednisone twice per day but often bumps it up on his own if he feels a flare is impending. He is still working actively at the [employer] and thus becomes quite incapacitated by gout.    Last seen: 12/15/2017    Interim history:  Murray is doing just OK. He was hospitalized for peritonitis a month after our last visit and had his PD catheter removed and is now on HD. He was then hospitalized for heart failure and is now waiting to determine what the plan for this is. He has bad AI and MR and valve replacement/repair for both is being contemplated vs listing for heart/kidney transplant. He has not had any gout or taken any prednisone for flares. He is on 5 mg of prednisone daily for prophylaxis and 400 mg of allopurinol daily.         Review of Systems (other than in HPI):   Constitutional: negative  Skin: negative  Eyes: negative  Ears/Nose/Throat: negative  Respiratory: negative  Cardiovascular: negative  Gastrointestinal: negative  Genitourinary: negative  Musculoskeletal: negative  Neurologic: negative  Psychiatric: negative  Hematologic/Lymphatic/Immunologic: negative  Endocrine: negative          Past Medical History:     Past Medical History:   Diagnosis Date     (HFpEF) heart failure with preserved ejection fraction (H)      Allergic rhinitis, cause unspecified      Anemia of chronic kidney failure      Ascending aortic aneurysm (H)      Bicuspid aortic valve      CAD (coronary artery disease)      Chronic kidney disease, stage 5 (H)      Congestive heart failure, unspecified      Dyslipidemia      Esophageal reflux      Hypersomnia with sleep apnea, unspecified      Hypertension      MGUS (monoclonal gammopathy of unknown significance)      SHEELA (obstructive sleep apnea)      Systolic heart failure (H)      Type 2 diabetes mellitus (H)      Past Surgical History:   Procedure  Laterality Date     LAPAROSCOPIC HERNIORRHAPHY INGUINAL BILATERAL Bilateral 2015    Procedure: LAPAROSCOPIC HERNIORRHAPHY INGUINAL BILATERAL;  Surgeon: Bobby Mcconnell MD;  Location: [facility] OR     LAPAROSCOPIC INSERTION CATHETER PERITONEAL DIALYSIS N/A 2017    Procedure: LAPAROSCOPIC INSERTION CATHETER PERITONEAL DIALYSIS;  Laparoscopic Peritoneal Dialysis Catheter Placement - Anesthesia with block;  Surgeon: Esteban Arvizu MD;  Location: [facility] OR     REMOVE CATHETER PERITONEAL Right 1/15/2018    Procedure: REMOVE CATHETER PERITONEAL;  Open Removal of Peritoneal Dialysis Catheter ;  Surgeon: Esteban Arvizu MD;  Location: [facility] OR          Social History:     Social History     Occupational History     Not on file.     Social History Main Topics     Smoking status: Former Smoker     Packs/day: 1.00     Years: 19.00     Types: Cigarettes     Quit date: 1994     Smokeless tobacco: Never Used     Alcohol use No     Drug use: No     Sexual activity: Not Currently     Partners: Female     Birth control/ protection: Condom          Family History:     Family History   Problem Relation Age of Onset     C.A.D. Father       from-never knew father-age 60     DIABETES Father      CEREBROVASCULAR DISEASE Father      Hypertension No family hx of      Breast Cancer No family hx of      Cancer - colorectal No family hx of      Prostate Cancer No family hx of      KIDNEY DISEASE No family hx of           Allergies:     Allergies   Allergen Reactions     Norco [Hydrocodone-Acetaminophen] Nausea and Vomiting     Cats      Throat tightness     Hydralazine Other (See Comments)     Didn't tolerate secondary to hypotension     Isosorbide Other (See Comments)     hypotension     Penicillins Hives     Seasonal Allergies      rhinitis     Shrimp      Throat closes           Medications:     Current Outpatient Prescriptions   Medication Sig Dispense Refill     glipiZIDE (GLUCOTROL) 5 MG tablet Take 1 tablet (5 mg) by mouth  daily (with breakfast) 30 tablet 1     allopurinol (ZYLOPRIM) 300 MG tablet Take 1 tablet (300 mg) by mouth daily 30 tablet 2     blood glucose monitoring (NO BRAND SPECIFIED) test strip Use to test blood sugar 2-3 times daily or as directed. 100 each 11     losartan (COZAAR) 25 MG tablet Take 1/2 tab (12.5 mg) daily 30 tablet 3     midodrine HCl 10 MG TABS Take 1 tablet by mouth three times a week 90 tablet 11     order for DME Equipment being ordered: Carol ()  Treatment Diagnosis: ESRD on PD  Pt has to be connected to PD all night and can not be disconnected, hence impending his mobility to go to the bathroom. At risk for infection if he does not have this equipment. 1 Units 0     B Complex-Biotin-FA (B-COMPLEX PO) Take 1 tablet by mouth daily       calcium acetate, Phos Binder, 667 MG TABS Take 1 tablet by mouth 3 times daily (with meals) 180 tablet 3     albuterol (PROAIR HFA/PROVENTIL HFA/VENTOLIN HFA) 108 (90 BASE) MCG/ACT Inhaler Inhale 2 puffs into the lungs every 6 hours as needed for shortness of breath / dyspnea or wheezing 1 Inhaler 0     fluticasone (FLONASE) 50 MCG/ACT spray Spray 1-2 sprays into both nostrils daily 16 g 11     atorvastatin (LIPITOR) 40 MG tablet Take 1 tablet (40 mg) by mouth daily 90 tablet 3     omeprazole (PRILOSEC) 20 MG CR capsule Take 20 mg by mouth daily At night       loratadine (CLARITIN) 10 MG tablet Take 10 mg by mouth daily as needed Reported on 5/3/2017       ASPIRIN 81 MG OR TABS Take 1 tablet (81 mg) by mouth at bedtime       [DISCONTINUED] glipiZIDE (GLUCOTROL) 5 MG tablet Take 5 mg by mouth daily (with breakfast)       traMADol (ULTRAM) 50 MG tablet Take 1 tablet (50 mg) by mouth every 6 hours as needed for moderate pain 50 tablet 0     bismuth subsalicylate (PEPTO BISMOL) 262 MG/15ML suspension Take 15 mLs by mouth every 6 hours as needed for indigestion            Physical Exam:   Blood pressure 90/57, pulse 121, temperature 97.7  F (36.5  C), temperature  "source Oral, height 1.753 m (5' 9\"), weight 76.4 kg (168 lb 8 oz).  Wt Readings from Last 4 Encounters:   18 76.4 kg (168 lb 8 oz)   18 78 kg (172 lb)   18 76.2 kg (168 lb)   18 76.3 kg (168 lb 3.2 oz)     Constitutional: well-developed, appearing stated age; cooperative  Eyes: normal EOM, PERRLA, vision, conjunctiva, sclera  ENT: normal external ears, nose, hearing, lips, teeth, gums, throat, no mucous membrane lesions, normal saliva pool  Neck: no mass or thyroid enlargement  GI: no abdominal mass or tenderness, no organomegaly  MS: The TMJ, neck, shoulder, elbow, wrist, MCP/PIP/DIP, spine, hip, knee, ankle, and foot MTP/IP joints were examined. No active synovitis or altered joint anatomy. Full joint ROM. Normal  strength. No dactylitis,  tenosynovitis, enthesopathy.   Skin: no nail pitting, alopecia, rash, nodules or lesions  Neuro: normal cranial nerves, strength, sensation  Psych: normal judgement, orientation, memory, affect         Data:     Results for orders placed or performed during the hospital encounter of 03/15/18   Glucose by meter   Result Value Ref Range    Glucose 113 (H) 70 - 99 mg/dL   Transesophageal Echocardiogram    Narrative    [id number]  [facility]  [id number]  [id number]^YANELY^YANELY^           [facility]  Echocardiography Laboratory  [address] [place], [place] [address]        Name: SANCHEZ MCNEIL  MRN: [id number]  : 1955  Study Date: 03/15/2018 12:21 PM  Age: 63 yrs  Gender: Male  Patient Location: [facility]  Reason For Study: , Systolic heart failure (H)  Ordering Physician: YANELY NY  Referring Physician: YANELY NY  Performed By: oRdolfo Leon MD     BSA: 1.9 m2  Height: 69 in  Weight: 164 lb  HR: 104  BP: 95/68 mmHg  Attestation  I was present during MARIANA probe placement by the fellow, Rodolfo Leon. I  personally viewed the imaging and agree with the interpretation and report as  documented by the " fellow.  _____________________________________________________________________________  __     Interpretation Summary  Evaluation performed under optimal hemodynamic condition, post HD, and with BP  98/68.  Severely (EF <30%) reduced left ventricular function is present. The Ejection  Fraction is estimated at 15-20%.  Global right ventricular function is mildly to moderately reduced.  Moderate under hypovolemic condition and likely severe under usual loading  conditon mitral regurgitation. A single, focused regurgitant jet appears to  originate mostly from the A3 and P3 scallops. Mixed etiology mitral  regurgitation [LV dilation with restriction of the posterior leaflet  (Alex IIIB)]. A secondary chordal structure appears disrupted in the A2  and P2 scallop.  Functionally bicuspid aortic valve with fusion of the left and right cusps  (Alex type III.) Severe holodiastolic aortic insufficiency is present.  The size of the annulus measures 26.8 mm by 2D and 31.5 mm by 3D. The max and  min diameter is 33 and 29.7 mm, respectively. The circumference is 99 mm and  the area is 7.7 cm2. There is little to no calcification in the area of the  annulus and in the LVOT.  Consider TAVR evaluation and mitral valve edge to edge repair.  _____________________________________________________________________________  __        Procedure  Procedure location Echo Lab. Informed consent for Transesophegeal echo  obtained. MARIANA Probe #[id number] was used during the procedure. Patient was sedated  using Fentanyl 100 mcg. Patient was sedated using Versed 2 mg. Total sedation  time: 45 minutes of continuous bedside 1:1 monitoring. The Transducer was  inserted with some difficulty . The patient tolerated the procedure well.  Complications None.     Left Ventricle  Severely (EF <30%) reduced left ventricular function is present. The Ejection  Fraction is estimated at 15-20%.     Right Ventricle  Global right ventricular function is mildly  to moderately reduced.     Atria  The left atrial appendage is normal. It is free of spontaneous echo contrast  and thrombus. Moderate to severe biatrial enlargement is present. The atrial  septum is intact as assessed by color Doppler and agitated saline bubble  study .        Mitral Valve  Torn chordae seen in the A3 P3 region. Severe mitral regurgitation with  systolic flow reversal noted in left lower pulmonary vein. Regurgitant jet  originating from A3 and P3 scallops. Mechanism seems to be LV dilation with  restriction of the posterior leaflet (Alex IIIB).     Aortic Valve  Functionally bicuspid aortic valve with fusion of the left and right cusps.  Severe aortic insufficiency is present.     Tricuspid Valve  Trace to mild tricuspid insufficiency is present.     Pulmonic Valve  The pulmonic valve is normal.     Vessels  Grade II atherosclerosis of the aorta.     Pericardium  No pericardial effusion is present.        _____________________________________________________________________________  __                    Report approved by: Sunny Galarza 03/15/2018 05:29 PM              _____________________________________________________________________________  __        Hemoglobin   Date Value Ref Range Status   03/07/2018 11.1 (L) 13.3 - 17.7 g/dL Final   02/19/2018 9.8 (L) 13.3 - 17.7 g/dL Final   02/14/2018 10.0 (L) 13.3 - 17.7 g/dL Final     Urea Nitrogen   Date Value Ref Range Status   03/15/2018 21 7 - 30 mg/dL Final   03/07/2018 49 (H) 7 - 30 mg/dL Final   02/21/2018 32 (H) 7 - 30 mg/dL Final     Creatinine   Date Value Ref Range Status   06/03/2013 2.1 (H) 0.66 - 1.25 mg/dL Final   05/17/2010 1.8 (H) 0.66 - 1.25 mg/dL Final     Comment:     New IDMS-traceable calibration  beginning 12/17/08     Sed Rate   Date Value Ref Range Status   01/13/2016 80 (H) 0 - 20 mm/h Final     CRP Inflammation   Date Value Ref Range Status   07/05/2017 35.4 (H) 0.0 - 8.0 mg/L Final   05/31/2017 15.9 (H) 0.0 - 8.0  mg/L Final   05/17/2017 39.3 (H) 0.0 - 8.0 mg/L Final     AST   Date Value Ref Range Status   03/07/2018 18 0 - 45 U/L Final   02/19/2018 18 0 - 45 U/L Final   02/02/2018 20 0 - 45 U/L Final     Albumin   Date Value Ref Range Status   02/19/2018 2.7 (L) 3.4 - 5.0 g/dL Final   02/02/2018 2.1 (L) 3.4 - 5.0 g/dL Final   01/07/2018 1.8 (L) 3.4 - 5.0 g/dL Final     Alkaline Phosphatase   Date Value Ref Range Status   03/07/2018 129 40 - 150 U/L Final   02/19/2018 102 40 - 150 U/L Final   02/02/2018 86 40 - 150 U/L Final     ALT   Date Value Ref Range Status   03/07/2018 21 0 - 70 U/L Final   02/19/2018 15 0 - 70 U/L Final   02/02/2018 16 0 - 70 U/L Final     Recent Labs   Lab Test  03/15/18   1244  03/07/18   0833  02/21/18   0900  02/19/18   1558   02/14/18   0716   02/02/18   1736   04/12/17   0935   04/09/15   0900   WBC   --   6.9   --   9.4   --   7.1   < >  7.4   < >   --    < >  6.8   HGB   --   11.1*   --   9.8*   --   10.0*   < >  9.1*   < >   --    < >  8.8*   HCT   --   34.7*   --   30.6*   --   32.0*   < >  29.2*   < >   --    < >  26.2*   MCV   --   102*   --   102*   --   99   < >  100   < >   --    < >  86   PLT   --   257   --   311   --   261  261   < >  229   < >   --    < >  339   BUN  21  49*  32*  56*   < >   --    < >  30   < >   --    < >  54*   TSH   --    --   3.59   --    --    --    --    --    --   1.26   --   3.47   AST   --   18   --   18   --    --    --   20   < >   --    < >  19   ALT   --   21   --   15   --    --    --   16   < >   --    < >  25   ALKPHOS   --   129   --   102   --    --    --   86   < >   --    < >  154*    < > = values in this interval not displayed.     Reviewed Rheumatology lab flowsheet    I saw the patient with the fellow.  My exam and recomendations are as described.      Alex Shaffer MD

## 2018-03-16 NOTE — MR AVS SNAPSHOT
After Visit Summary   3/16/2018    Murray Nicholson    MRN: [id number]           Patient Information     Date Of Birth          1955        Visit Information        Provider Department      3/16/2018 9:30 AM Darvin Tang MD [facility] Rheumatology        Today's Diagnoses     Type 2 diabetes mellitus with stage 5 chronic kidney disease not on chronic dialysis, without long-term current use of insulin (H)    -  1    Chronic gout of wrist, unspecified cause, unspecified laterality        Gout, unspecified cause, unspecified chronicity, unspecified site          Care Instructions    Stop prednisone.    Decrease allopurinol to 300 mg daily.          Follow-ups after your visit        Follow-up notes from your care team     Return in about 3 months (around 6/16/2018).      Your next 10 appointments already scheduled     Mar 28, 2018 10:30 AM CDT   Cardiac Evaluation with  Cardiac Rehab 1   [facility] [place], Cardiac Rehabilitation ([facility])    [address]  [facility] [address]   [phone]            Apr 04, 2018 11:30 AM CDT   SHORT with Tono Clay [facility] (Critical access hospital)    [address]  [address]  [place] [address]   [phone]            Jun 22, 2018 10:00 AM CDT   (Arrive by 9:45 AM)   Return Visit with Darvin Tang MD   [facility] Rheumatology ([facility])    [address]  [address]  [facility] [id number] [phone]              Who to contact     If you have questions or need follow up information about today's clinic visit or your schedule please contact [facility] RHEUMATOLOGY directly at [phone].  Normal or non-critical lab and imaging results will be communicated to you by MyChart, letter or phone within 4 business days after the clinic has received the results. If you do not hear from us  "within 7 days, please contact the clinic through [employer] or phone. If you have a critical or abnormal lab result, we will notify you by phone as soon as possible.  Submit refill requests through [employer] or call your pharmacy and they will forward the refill request to us. Please allow 3 business days for your refill to be completed.          Additional Information About Your Visit        [employer] Information     [employer] gives you secure access to your electronic health record. If you see a primary care provider, you can also send messages to your care team and make appointments. If you have questions, please call your primary care clinic.  If you do not have a primary care provider, please call [phone] and they will assist you.        Care EveryWhere ID     This is your Care EveryWhere ID. This could be used by other organizations to access your [facility] medical records  [id number]        Your Vitals Were     Pulse Temperature Height BMI (Body Mass Index)          121 97.7  F (36.5  C) (Oral) 1.753 m (5' 9\") 24.88 kg/m2         Blood Pressure from Last 3 Encounters:   03/16/18 90/57   03/15/18 103/69   03/12/18 97/64    Weight from Last 3 Encounters:   03/16/18 76.4 kg (168 lb 8 oz)   03/12/18 78 kg (172 lb)   03/07/18 76.2 kg (168 lb)              Today, you had the following     No orders found for display         Today's Medication Changes          These changes are accurate as of 3/16/18 10:40 AM.  If you have any questions, ask your nurse or doctor.               These medicines have changed or have updated prescriptions.        Dose/Directions    allopurinol 300 MG tablet   Commonly known as:  ZYLOPRIM   This may have changed:    - additional instructions  - Another medication with the same name was removed. Continue taking this medication, and follow the directions you see here.   Used for:  Chronic gout of wrist, unspecified cause, unspecified laterality, Gout, unspecified cause, unspecified " chronicity, unspecified site   Changed by:  Darvin Tang MD        Dose:  300 mg   Take 1 tablet (300 mg) by mouth daily   Quantity:  30 tablet   Refills:  2         Stop taking these medicines if you haven't already. Please contact your care team if you have questions.     predniSONE 5 MG tablet   Commonly known as:  DELTASONE   Stopped by:  Darvin Tang MD                Where to get your medicines      These medications were sent to [facility] [id number] - [place], [place] - [address], [place] [address]     Phone:  [phone]     allopurinol 300 MG tablet    glipiZIDE 5 MG tablet                Primary Care Provider Office Phone # Fax #    Yahir Turcios [phone] [address]  [facility] [address]        Equal Access to Services     : Hadii marsha wang hadasho Soomaali, waaxda luqadaha, qaybta kaalmada adeegyada, jose thompson haygary palencia . So [facility] [phone].    ATENCIÓN: Si habla español, tiene a ortiz disposición servicios gratuitos de asistencia lingüística. Ll[facility] [phone].    We comply with applicable federal civil rights laws and [place] laws. We do not discriminate on the basis of race, color, national origin, age, disability, sex, sexual orientation, or gender identity.            Thank you!     Thank you for choosing [facility] RHEUMATOLOGY  for your care. Our goal is always to provide you with excellent care. Hearing back from our patients is one way we can continue to improve our services. Please take a few minutes to complete the written survey that you may receive in the mail after your visit with us. Thank you!             Your Updated Medication List - Protect others around you: Learn how to safely use, store and throw away your medicines at www.disposemymeds.org.          This list is accurate as of 3/16/18 10:40 AM.  Always use your most recent med list.                    Brand Name Dispense Instructions for use Diagnosis    albuterol 108 (90 BASE) MCG/ACT Inhaler    PROAIR HFA/PROVENTIL HFA/VENTOLIN HFA    1 Inhaler    Inhale 2 puffs into the lungs every 6 hours as needed for shortness of breath / dyspnea or wheezing    Cough       allopurinol 300 MG tablet    ZYLOPRIM    30 tablet    Take 1 tablet (300 mg) by mouth daily    Chronic gout of wrist, unspecified cause, unspecified laterality, Gout, unspecified cause, unspecified chronicity, unspecified site       aspirin 81 MG tablet      Take 1 tablet (81 mg) by mouth at bedtime        atorvastatin 40 MG tablet    LIPITOR    90 tablet    Take 1 tablet (40 mg) by mouth daily    CKD (chronic kidney disease) stage 3, GFR 30-59 ml/min, Hyperlipidemia LDL goal <100       B-COMPLEX PO      Take 1 tablet by mouth daily        bismuth subsalicylate 262 MG/15ML suspension    PEPTO BISMOL     Take 15 mLs by mouth every 6 hours as needed for indigestion        blood glucose monitoring test strip    no brand specified    100 each    Use to test blood sugar 2-3 times daily or as directed.    Type 2 diabetes mellitus with stage 5 chronic kidney disease not on chronic dialysis, without long-term current use of insulin (H)       calcium acetate (Phos Binder) 667 MG Tabs     180 tablet    Take 1 tablet by mouth 3 times daily (with meals)    Hyperphosphatemia       fluticasone 50 MCG/ACT spray    FLONASE    16 g    Spray 1-2 sprays into both nostrils daily    Nasal congestion       glipiZIDE 5 MG tablet    GLUCOTROL    30 tablet    Take 1 tablet (5 mg) by mouth daily (with breakfast)    Type 2 diabetes mellitus with stage 5 chronic kidney disease not on chronic dialysis, without long-term current use of insulin (H)       loratadine 10 MG tablet    CLARITIN     Take 10 mg by mouth daily as needed Reported on 5/3/2017    Hypertensive cardiopathy, SOB (shortness of breath)       losartan 25 MG tablet    COZAAR    30 tablet    Take 1/2 tab (12.5 mg) daily     Systolic heart failure, unspecified heart failure chronicity (H)       midodrine HCl 10 MG Tabs     90 tablet    Take 1 tablet by mouth three times a week    Hemodialysis-associated hypotension       omeprazole 20 MG CR capsule    priLOSEC     Take 20 mg by mouth daily At night        order for DME     1 Units    Equipment being ordered: Commode () Treatment Diagnosis: ESRD on PD Pt has to be connected to PD all night and can not be disconnected, hence impending his mobility to go to the bathroom. At risk for infection if he does not have this equipment.    CKD (chronic kidney disease) stage 5, GFR less than 15 ml/min (H)       traMADol 50 MG tablet    ULTRAM    50 tablet    Take 1 tablet (50 mg) by mouth every 6 hours as needed for moderate pain    Abdominal pain, generalized

## 2018-03-16 NOTE — Clinical Note
Maliha - This gentleman dialyses on Tues and Thurs, so can't do appts on those days.  I have asked Radha to schedule his RHC with Dr Ernst on Wed 3/28, so just check with him for a time.

## 2018-03-16 NOTE — PROGRESS NOTES
Rheumatology Clinic Visit     Murray Nicholson MRN# [id number]   YOB: 1955 Age: 63 year old     Date of Visit: 03/16/2018  Primary care provider: Yahir Turcios          Assessment and Plan:   #Polyarticular gout, recurrent and involving the hips with dual-energy CT showing MSU deposition bilaterally  #Hyperuricemia  #ESRD on hemodialysis  #DM II     -decrease allopurinol to 300 mg daily  -uric acid goal is < 5.0  -discontinue prednisone to 5 mg daily for flare prophylaxis, could consider colchicine post-HD if having flares and needing prophylaxis, would prefer to minimize prednisone given indwelling HD catheter  -prednisone 40 mg daily for 3 days for gout flare    Seen and discussed with Dr. Shaffer.    Darvin Tang MD  Rheumatology Fellow  [phone]          Active Problem List:     Patient Active Problem List    Diagnosis Date Noted     Heart failure (H) 02/02/2018     Priority: Medium     Peritonitis (H) 01/07/2018     Priority: Medium     Volume overload 11/13/2017     Priority: Medium     Chronic systolic heart failure (H) 07/25/2017     Priority: Medium     Fluid overload 04/08/2017     Priority: Medium     Anemia in stage 5 chronic kidney disease (H) 03/17/2017     Priority: Medium     Anemia, iron deficiency 02/15/2017     Priority: Medium     Type 2 diabetes mellitus with diabetic chronic kidney disease (H) 10/14/2015     Priority: Medium     Hypertension      Priority: Medium     Dyslipidemia      Priority: Medium     MGUS (monoclonal gammopathy of unknown significance)      Priority: Medium     Ascending aortic aneurysm (H)      Priority: Medium     Anemia in chronic renal disease 05/19/2015     Priority: Medium     updating diagnosis code for icd10 cutover       CAD (coronary artery disease) 07/10/2014     Priority: Medium     Bicuspid aortic valve      Priority: Medium     Anemia of chronic disease 04/12/2013     Priority: Medium     Problem list name updated by automated process.  Provider to review and confirm       Tubular adenoma 12/30/2011     Priority: Medium     Hypertensive cardiopathy 08/24/2011     Priority: Medium     Hypertension goal BP (blood pressure) < 130/80 01/25/2011     Priority: Medium     Hyperlipidemia LDL goal <100 10/31/2010     Priority: Medium     Per provider       CKD (chronic kidney disease) stage 5, GFR less than 15 ml/min (H) 02/09/2010     Priority: Medium     CHF (congestive heart failure) (H) 08/20/2008     Priority: Medium     Allergic rhinitis 04/05/2006     Priority: Medium     Problem list name updated by automated process. Provider to review       Hypersomnia with sleep apnea 08/18/2005     Priority: Medium     Problem list name updated by automated process. Provider to review       Esophageal reflux 08/12/2004     Priority: Medium          History of Present Illness:   Murray Nicholson is a 62 year old male with history of CAD and ischemic cardiomyopathy, CKD V planning on starting peritoneal dialysis next month, and DM II who presents for evaluation of gout. He was seen initially in April of 2017 while in the hospital by Dr. Gaston. He had polyarticular gout at that time, however he also had diffuse low back pain radiating into the groin and an MRI of the lumbar spine showed inflammation of the soft tissues and muscles of the right hip. Subsequent hip MRI showed cystic changes in both hips. His uric acid was checked and was 17.9. He was diagnosed with gout clinically because he declined an aspiration. He was started on allopurinol and prednisone with prompt resolution of his symptoms. His CK was normal. He also reported a 1.5-2.0 year history of intermittent episodes of podagra and acute gout of his ankles. He has not had a kidney biopsy.     He denies pain in his hands, elbows, or shoulders. He also denies lumps, bumps, or deposits consistent with tophi. He does not have morning stiffness. Since being seen last he has been taking allopurinol 100 mg daily  without trouble. He takes 10 mg of prednisone twice per day but often bumps it up on his own if he feels a flare is impending. He is still working actively at the [employer] and thus becomes quite incapacitated by gout.    Last seen: 12/15/2017    Interim history:  Murray is doing just OK. He was hospitalized for peritonitis a month after our last visit and had his PD catheter removed and is now on HD. He was then hospitalized for heart failure and is now waiting to determine what the plan for this is. He has bad AI and MR and valve replacement/repair for both is being contemplated vs listing for heart/kidney transplant. He has not had any gout or taken any prednisone for flares. He is on 5 mg of prednisone daily for prophylaxis and 400 mg of allopurinol daily.         Review of Systems (other than in HPI):   Constitutional: negative  Skin: negative  Eyes: negative  Ears/Nose/Throat: negative  Respiratory: negative  Cardiovascular: negative  Gastrointestinal: negative  Genitourinary: negative  Musculoskeletal: negative  Neurologic: negative  Psychiatric: negative  Hematologic/Lymphatic/Immunologic: negative  Endocrine: negative          Past Medical History:     Past Medical History:   Diagnosis Date     (HFpEF) heart failure with preserved ejection fraction (H)      Allergic rhinitis, cause unspecified      Anemia of chronic kidney failure      Ascending aortic aneurysm (H)      Bicuspid aortic valve      CAD (coronary artery disease)      Chronic kidney disease, stage 5 (H)      Congestive heart failure, unspecified      Dyslipidemia      Esophageal reflux      Hypersomnia with sleep apnea, unspecified      Hypertension      MGUS (monoclonal gammopathy of unknown significance)      SHEELA (obstructive sleep apnea)      Systolic heart failure (H)      Type 2 diabetes mellitus (H)      Past Surgical History:   Procedure Laterality Date     LAPAROSCOPIC HERNIORRHAPHY INGUINAL BILATERAL Bilateral 7/24/2015     Procedure: LAPAROSCOPIC HERNIORRHAPHY INGUINAL BILATERAL;  Surgeon: Bobby Mcconnell MD;  Location: [facility] OR     LAPAROSCOPIC INSERTION CATHETER PERITONEAL DIALYSIS N/A 2017    Procedure: LAPAROSCOPIC INSERTION CATHETER PERITONEAL DIALYSIS;  Laparoscopic Peritoneal Dialysis Catheter Placement - Anesthesia with block;  Surgeon: Esteban Arvizu MD;  Location: [facility] OR     REMOVE CATHETER PERITONEAL Right 1/15/2018    Procedure: REMOVE CATHETER PERITONEAL;  Open Removal of Peritoneal Dialysis Catheter ;  Surgeon: Esteban Arvizu MD;  Location: [facility] OR          Social History:     Social History     Occupational History     Not on file.     Social History Main Topics     Smoking status: Former Smoker     Packs/day: 1.00     Years: 19.00     Types: Cigarettes     Quit date: 1994     Smokeless tobacco: Never Used     Alcohol use No     Drug use: No     Sexual activity: Not Currently     Partners: Female     Birth control/ protection: Condom          Family History:     Family History   Problem Relation Age of Onset     C.A.D. Father       from-never knew father-age 60     DIABETES Father      CEREBROVASCULAR DISEASE Father      Hypertension No family hx of      Breast Cancer No family hx of      Cancer - colorectal No family hx of      Prostate Cancer No family hx of      KIDNEY DISEASE No family hx of           Allergies:     Allergies   Allergen Reactions     Norco [Hydrocodone-Acetaminophen] Nausea and Vomiting     Cats      Throat tightness     Hydralazine Other (See Comments)     Didn't tolerate secondary to hypotension     Isosorbide Other (See Comments)     hypotension     Penicillins Hives     Seasonal Allergies      rhinitis     Shrimp      Throat closes           Medications:     Current Outpatient Prescriptions   Medication Sig Dispense Refill     glipiZIDE (GLUCOTROL) 5 MG tablet Take 1 tablet (5 mg) by mouth daily (with breakfast) 30 tablet 1     allopurinol (ZYLOPRIM) 300 MG tablet Take 1 tablet  "(300 mg) by mouth daily 30 tablet 2     blood glucose monitoring (NO BRAND SPECIFIED) test strip Use to test blood sugar 2-3 times daily or as directed. 100 each 11     losartan (COZAAR) 25 MG tablet Take 1/2 tab (12.5 mg) daily 30 tablet 3     midodrine HCl 10 MG TABS Take 1 tablet by mouth three times a week 90 tablet 11     order for DME Equipment being ordered: Carol ()  Treatment Diagnosis: ESRD on PD  Pt has to be connected to PD all night and can not be disconnected, hence impending his mobility to go to the bathroom. At risk for infection if he does not have this equipment. 1 Units 0     B Complex-Biotin-FA (B-COMPLEX PO) Take 1 tablet by mouth daily       calcium acetate, Phos Binder, 667 MG TABS Take 1 tablet by mouth 3 times daily (with meals) 180 tablet 3     albuterol (PROAIR HFA/PROVENTIL HFA/VENTOLIN HFA) 108 (90 BASE) MCG/ACT Inhaler Inhale 2 puffs into the lungs every 6 hours as needed for shortness of breath / dyspnea or wheezing 1 Inhaler 0     fluticasone (FLONASE) 50 MCG/ACT spray Spray 1-2 sprays into both nostrils daily 16 g 11     atorvastatin (LIPITOR) 40 MG tablet Take 1 tablet (40 mg) by mouth daily 90 tablet 3     omeprazole (PRILOSEC) 20 MG CR capsule Take 20 mg by mouth daily At night       loratadine (CLARITIN) 10 MG tablet Take 10 mg by mouth daily as needed Reported on 5/3/2017       ASPIRIN 81 MG OR TABS Take 1 tablet (81 mg) by mouth at bedtime       [DISCONTINUED] glipiZIDE (GLUCOTROL) 5 MG tablet Take 5 mg by mouth daily (with breakfast)       traMADol (ULTRAM) 50 MG tablet Take 1 tablet (50 mg) by mouth every 6 hours as needed for moderate pain 50 tablet 0     bismuth subsalicylate (PEPTO BISMOL) 262 MG/15ML suspension Take 15 mLs by mouth every 6 hours as needed for indigestion            Physical Exam:   Blood pressure 90/57, pulse 121, temperature 97.7  F (36.5  C), temperature source Oral, height 1.753 m (5' 9\"), weight 76.4 kg (168 lb 8 oz).  Wt Readings from Last 4 " Encounters:   18 76.4 kg (168 lb 8 oz)   18 78 kg (172 lb)   18 76.2 kg (168 lb)   18 76.3 kg (168 lb 3.2 oz)     Constitutional: well-developed, appearing stated age; cooperative  Eyes: normal EOM, PERRLA, vision, conjunctiva, sclera  ENT: normal external ears, nose, hearing, lips, teeth, gums, throat, no mucous membrane lesions, normal saliva pool  Neck: no mass or thyroid enlargement  GI: no abdominal mass or tenderness, no organomegaly  MS: The TMJ, neck, shoulder, elbow, wrist, MCP/PIP/DIP, spine, hip, knee, ankle, and foot MTP/IP joints were examined. No active synovitis or altered joint anatomy. Full joint ROM. Normal  strength. No dactylitis,  tenosynovitis, enthesopathy.   Skin: no nail pitting, alopecia, rash, nodules or lesions  Neuro: normal cranial nerves, strength, sensation  Psych: normal judgement, orientation, memory, affect         Data:     Results for orders placed or performed during the hospital encounter of 03/15/18   Glucose by meter   Result Value Ref Range    Glucose 113 (H) 70 - 99 mg/dL   Transesophageal Echocardiogram    Narrative    [id number]  [id number]  [id number]  [id number]^YANELY^YANELY^           [facility]  Echocardiography Laboratory  [address] [place] [id number]        Name: SANCHEZ MCNEIL  MRN: [id number]  : 1955  Study Date: 03/15/2018 12:21 PM  Age: 63 yrs  Gender: Male  Patient Location: [facility]  Reason For Study: , Systolic heart failure (H)  Ordering Physician: YANELY NY  Referring Physician: YANELY NY  Performed By: Rodolfo Leon MD     BSA: 1.9 m2  Height: 69 in  Weight: 164 lb  HR: 104  BP: 95/68 mmHg  Attestation  I was present during MARIANA probe placement by the fellow, Rodolfo Leon. I  personally viewed the imaging and agree with the interpretation and report as  documented by the  fellow.  _____________________________________________________________________________  __     Interpretation Summary  Evaluation performed under optimal hemodynamic condition, post HD, and with BP  98/68.  Severely (EF <30%) reduced left ventricular function is present. The Ejection  Fraction is estimated at 15-20%.  Global right ventricular function is mildly to moderately reduced.  Moderate under hypovolemic condition and likely severe under usual loading  conditon mitral regurgitation. A single, focused regurgitant jet appears to  originate mostly from the A3 and P3 scallops. Mixed etiology mitral  regurgitation [LV dilation with restriction of the posterior leaflet  (Alex IIIB)]. A secondary chordal structure appears disrupted in the A2  and P2 scallop.  Functionally bicuspid aortic valve with fusion of the left and right cusps  (Alex type III.) Severe holodiastolic aortic insufficiency is present.  The size of the annulus measures 26.8 mm by 2D and 31.5 mm by 3D. The max and  min diameter is 33 and 29.7 mm, respectively. The circumference is 99 mm and  the area is 7.7 cm2. There is little to no calcification in the area of the  annulus and in the LVOT.  Consider TAVR evaluation and mitral valve edge to edge repair.  _____________________________________________________________________________  __        Procedure  Procedure location Echo Lab. Informed consent for Transesophegeal echo  obtained. MARIANA Probe #[id number] was used during the procedure. Patient was sedated  using Fentanyl 100 mcg. Patient was sedated using Versed 2 mg. Total sedation  time: 45 minutes of continuous bedside 1:1 monitoring. The Transducer was  inserted with some difficulty . The patient tolerated the procedure well.  Complications None.     Left Ventricle  Severely (EF <30%) reduced left ventricular function is present. The Ejection  Fraction is estimated at 15-20%.     Right Ventricle  Global right ventricular function is mildly  to moderately reduced.     Atria  The left atrial appendage is normal. It is free of spontaneous echo contrast  and thrombus. Moderate to severe biatrial enlargement is present. The atrial  septum is intact as assessed by color Doppler and agitated saline bubble  study .        Mitral Valve  Torn chordae seen in the A3 P3 region. Severe mitral regurgitation with  systolic flow reversal noted in left lower pulmonary vein. Regurgitant jet  originating from A3 and P3 scallops. Mechanism seems to be LV dilation with  restriction of the posterior leaflet (Alex IIIB).     Aortic Valve  Functionally bicuspid aortic valve with fusion of the left and right cusps.  Severe aortic insufficiency is present.     Tricuspid Valve  Trace to mild tricuspid insufficiency is present.     Pulmonic Valve  The pulmonic valve is normal.     Vessels  Grade II atherosclerosis of the aorta.     Pericardium  No pericardial effusion is present.        _____________________________________________________________________________  __                    Report approved by: Sunny Galarza 03/15/2018 05:29 PM              _____________________________________________________________________________  __        Hemoglobin   Date Value Ref Range Status   03/07/2018 11.1 (L) 13.3 - 17.7 g/dL Final   02/19/2018 9.8 (L) 13.3 - 17.7 g/dL Final   02/14/2018 10.0 (L) 13.3 - 17.7 g/dL Final     Urea Nitrogen   Date Value Ref Range Status   03/15/2018 21 7 - 30 mg/dL Final   03/07/2018 49 (H) 7 - 30 mg/dL Final   02/21/2018 32 (H) 7 - 30 mg/dL Final     Creatinine   Date Value Ref Range Status   06/03/2013 2.1 (H) 0.66 - 1.25 mg/dL Final   05/17/2010 1.8 (H) 0.66 - 1.25 mg/dL Final     Comment:     New IDMS-traceable calibration  beginning 12/17/08     Sed Rate   Date Value Ref Range Status   01/13/2016 80 (H) 0 - 20 mm/h Final     CRP Inflammation   Date Value Ref Range Status   07/05/2017 35.4 (H) 0.0 - 8.0 mg/L Final   05/31/2017 15.9 (H) 0.0 - 8.0  mg/L Final   05/17/2017 39.3 (H) 0.0 - 8.0 mg/L Final     AST   Date Value Ref Range Status   03/07/2018 18 0 - 45 U/L Final   02/19/2018 18 0 - 45 U/L Final   02/02/2018 20 0 - 45 U/L Final     Albumin   Date Value Ref Range Status   02/19/2018 2.7 (L) 3.4 - 5.0 g/dL Final   02/02/2018 2.1 (L) 3.4 - 5.0 g/dL Final   01/07/2018 1.8 (L) 3.4 - 5.0 g/dL Final     Alkaline Phosphatase   Date Value Ref Range Status   03/07/2018 129 40 - 150 U/L Final   02/19/2018 102 40 - 150 U/L Final   02/02/2018 86 40 - 150 U/L Final     ALT   Date Value Ref Range Status   03/07/2018 21 0 - 70 U/L Final   02/19/2018 15 0 - 70 U/L Final   02/02/2018 16 0 - 70 U/L Final     Recent Labs   Lab Test  03/15/18   1244  03/07/18   0833  02/21/18   0900  02/19/18   1558   02/14/18   0716   02/02/18   1736   04/12/17   0935   04/09/15   0900   WBC   --   6.9   --   9.4   --   7.1   < >  7.4   < >   --    < >  6.8   HGB   --   11.1*   --   9.8*   --   10.0*   < >  9.1*   < >   --    < >  8.8*   HCT   --   34.7*   --   30.6*   --   32.0*   < >  29.2*   < >   --    < >  26.2*   MCV   --   102*   --   102*   --   99   < >  100   < >   --    < >  86   PLT   --   257   --   311   --   261  261   < >  229   < >   --    < >  339   BUN  21  49*  32*  56*   < >   --    < >  30   < >   --    < >  54*   TSH   --    --   3.59   --    --    --    --    --    --   1.26   --   3.47   AST   --   18   --   18   --    --    --   20   < >   --    < >  19   ALT   --   21   --   15   --    --    --   16   < >   --    < >  25   ALKPHOS   --   129   --   102   --    --    --   86   < >   --    < >  154*    < > = values in this interval not displayed.     Reviewed Rheumatology lab flowsheet    I saw the patient with the fellow.  My exam and recomendations are as described.      Alex Shaffer MD

## 2018-03-16 NOTE — NURSING NOTE
"Chief Complaint   Patient presents with     RECHECK     follow up with Gout, tzimmer cma       Initial BP 90/57  Pulse 121  Temp 97.7  F (36.5  C) (Oral)  Ht 1.753 m (5' 9\")  Wt 76.4 kg (168 lb 8 oz)  BMI 24.88 kg/m2 Estimated body mass index is 24.88 kg/(m^2) as calculated from the following:    Height as of this encounter: 1.753 m (5' 9\").    Weight as of this encounter: 76.4 kg (168 lb 8 oz).  Medication Reconciliation: complete    "

## 2018-03-19 ENCOUNTER — DOCUMENTATION ONLY (OUTPATIENT)
Dept: TRANSPLANT | Facility: CLINIC | Age: 63
End: 2018-03-19

## 2018-03-21 ENCOUNTER — TELEPHONE (OUTPATIENT)
Dept: VASCULAR SURGERY | Facility: CLINIC | Age: 63
End: 2018-03-21

## 2018-03-21 ENCOUNTER — TELEPHONE (OUTPATIENT)
Dept: INTERVENTIONAL RADIOLOGY/VASCULAR | Facility: CLINIC | Age: 63
End: 2018-03-21

## 2018-03-21 ENCOUNTER — DOCUMENTATION ONLY (OUTPATIENT)
Dept: TRANSPLANT | Facility: CLINIC | Age: 63
End: 2018-03-21

## 2018-03-21 DIAGNOSIS — T82.49XA COMPLICATIONS, DIALYSIS, CATHETER, MECHANICAL, INITIAL ENCOUNTER (H): ICD-10-CM

## 2018-03-21 DIAGNOSIS — Z99.2 ESRD ON DIALYSIS (H): Primary | ICD-10-CM

## 2018-03-21 DIAGNOSIS — N18.6 ESRD ON DIALYSIS (H): Primary | ICD-10-CM

## 2018-03-21 NOTE — TELEPHONE ENCOUNTER
Per task, pt needs schedule a cvc tunneled line removal asap. IR was able to schedule him for 3/22. LM with the appt info and asked pt to call back to confirm. Also will fax appt info to pt dialysis office

## 2018-03-22 ENCOUNTER — APPOINTMENT (OUTPATIENT)
Dept: MEDSURG UNIT | Facility: CLINIC | Age: 63
End: 2018-03-22
Attending: RADIOLOGY
Payer: COMMERCIAL

## 2018-03-22 ENCOUNTER — APPOINTMENT (OUTPATIENT)
Dept: INTERVENTIONAL RADIOLOGY/VASCULAR | Facility: CLINIC | Age: 63
End: 2018-03-22
Attending: CLINICAL NURSE SPECIALIST
Payer: COMMERCIAL

## 2018-03-22 ENCOUNTER — HOSPITAL ENCOUNTER (OUTPATIENT)
Facility: CLINIC | Age: 63
Discharge: HOME | End: 2018-03-22
Attending: RADIOLOGY | Admitting: RADIOLOGY
Payer: COMMERCIAL

## 2018-03-22 VITALS
WEIGHT: 165 LBS | HEIGHT: 69 IN | OXYGEN SATURATION: 97 % | RESPIRATION RATE: 18 BRPM | HEART RATE: 115 BPM | BODY MASS INDEX: 24.44 KG/M2 | DIASTOLIC BLOOD PRESSURE: 73 MMHG | TEMPERATURE: 97.3 F | SYSTOLIC BLOOD PRESSURE: 119 MMHG

## 2018-03-22 DIAGNOSIS — N18.6 ESRD ON DIALYSIS (H): ICD-10-CM

## 2018-03-22 DIAGNOSIS — Z99.2 ESRD ON DIALYSIS (H): ICD-10-CM

## 2018-03-22 DIAGNOSIS — T82.49XA COMPLICATIONS, DIALYSIS, CATHETER, MECHANICAL, INITIAL ENCOUNTER (H): ICD-10-CM

## 2018-03-22 LAB
ANION GAP SERPL CALCULATED.3IONS-SCNC: 13 MMOL/L (ref 3–14)
BUN SERPL-MCNC: 24 MG/DL (ref 7–30)
CALCIUM SERPL-MCNC: 9 MG/DL (ref 8.5–10.1)
CHLORIDE SERPL-SCNC: 103 MMOL/L (ref 94–109)
CO2 SERPL-SCNC: 19 MMOL/L (ref 20–32)
CREAT SERPL-MCNC: 3.94 MG/DL (ref 0.66–1.25)
GFR SERPL CREATININE-BSD FRML MDRD: 16 ML/MIN/1.7M2
GLUCOSE BLDC GLUCOMTR-MCNC: 141 MG/DL (ref 70–99)
GLUCOSE SERPL-MCNC: 135 MG/DL (ref 70–99)
POTASSIUM SERPL-SCNC: 3.3 MMOL/L (ref 3.4–5.3)
SODIUM SERPL-SCNC: 135 MMOL/L (ref 133–144)

## 2018-03-22 PROCEDURE — 25000128 H RX IP 250 OP 636: Performed by: RADIOLOGY

## 2018-03-22 PROCEDURE — 99152 MOD SED SAME PHYS/QHP 5/>YRS: CPT

## 2018-03-22 PROCEDURE — C1750 CATH, HEMODIALYSIS,LONG-TERM: HCPCS

## 2018-03-22 PROCEDURE — 76937 US GUIDE VASCULAR ACCESS: CPT

## 2018-03-22 PROCEDURE — 25000128 H RX IP 250 OP 636: Performed by: PHYSICIAN ASSISTANT

## 2018-03-22 PROCEDURE — 99153 MOD SED SAME PHYS/QHP EA: CPT

## 2018-03-22 PROCEDURE — 25000125 ZZHC RX 250: Performed by: RADIOLOGY

## 2018-03-22 PROCEDURE — 25000125 ZZHC RX 250: Performed by: PHYSICIAN ASSISTANT

## 2018-03-22 PROCEDURE — 27210904 ZZH KIT CR6

## 2018-03-22 PROCEDURE — 40000166 ZZH STATISTIC PP CARE STAGE 1

## 2018-03-22 PROCEDURE — 36581 REPLACE TUNNELED CV CATH: CPT

## 2018-03-22 PROCEDURE — 82962 GLUCOSE BLOOD TEST: CPT

## 2018-03-22 PROCEDURE — 27210886 ZZH ACCESSORY CR5

## 2018-03-22 PROCEDURE — 80048 BASIC METABOLIC PNL TOTAL CA: CPT | Performed by: RADIOLOGY

## 2018-03-22 PROCEDURE — C1769 GUIDE WIRE: HCPCS

## 2018-03-22 RX ORDER — HEPARIN SODIUM 1000 [USP'U]/ML
5100 INJECTION, SOLUTION INTRAVENOUS; SUBCUTANEOUS ONCE
Status: COMPLETED | OUTPATIENT
Start: 2018-03-22 | End: 2018-03-22

## 2018-03-22 RX ORDER — CLINDAMYCIN PHOSPHATE 900 MG/50ML
900 INJECTION, SOLUTION INTRAVENOUS
Status: COMPLETED | OUTPATIENT
Start: 2018-03-22 | End: 2018-03-22

## 2018-03-22 RX ORDER — NALOXONE HYDROCHLORIDE 0.4 MG/ML
.1-.4 INJECTION, SOLUTION INTRAMUSCULAR; INTRAVENOUS; SUBCUTANEOUS
Status: DISCONTINUED | OUTPATIENT
Start: 2018-03-22 | End: 2018-03-22 | Stop reason: HOSPADM

## 2018-03-22 RX ORDER — HEPARIN SODIUM 1000 [USP'U]/ML
3 INJECTION, SOLUTION INTRAVENOUS; SUBCUTANEOUS ONCE
Status: DISCONTINUED | OUTPATIENT
Start: 2018-03-22 | End: 2018-03-22 | Stop reason: HOSPADM

## 2018-03-22 RX ORDER — FENTANYL CITRATE 50 UG/ML
25-50 INJECTION, SOLUTION INTRAMUSCULAR; INTRAVENOUS EVERY 5 MIN PRN
Status: DISCONTINUED | OUTPATIENT
Start: 2018-03-22 | End: 2018-03-22 | Stop reason: HOSPADM

## 2018-03-22 RX ORDER — SODIUM CHLORIDE 9 MG/ML
INJECTION, SOLUTION INTRAVENOUS CONTINUOUS
Status: DISCONTINUED | OUTPATIENT
Start: 2018-03-22 | End: 2018-03-22 | Stop reason: HOSPADM

## 2018-03-22 RX ORDER — FLUMAZENIL 0.1 MG/ML
0.2 INJECTION, SOLUTION INTRAVENOUS
Status: DISCONTINUED | OUTPATIENT
Start: 2018-03-22 | End: 2018-03-22 | Stop reason: HOSPADM

## 2018-03-22 RX ORDER — IODIXANOL 320 MG/ML
50 INJECTION, SOLUTION INTRAVASCULAR ONCE
Status: COMPLETED | OUTPATIENT
Start: 2018-03-22 | End: 2018-03-22

## 2018-03-22 RX ADMIN — SODIUM CHLORIDE: 9 INJECTION, SOLUTION INTRAVENOUS at 13:36

## 2018-03-22 RX ADMIN — HEPARIN SODIUM 5000 UNITS: 1000 INJECTION, SOLUTION INTRAVENOUS; SUBCUTANEOUS at 15:03

## 2018-03-22 RX ADMIN — FENTANYL CITRATE 25 MCG: 50 INJECTION INTRAMUSCULAR; INTRAVENOUS at 14:50

## 2018-03-22 RX ADMIN — IODIXANOL 8 ML: 320 INJECTION, SOLUTION INTRAVASCULAR at 15:33

## 2018-03-22 RX ADMIN — LIDOCAINE HYDROCHLORIDE 5 ML: 10 INJECTION, SOLUTION EPIDURAL; INFILTRATION; INTRACAUDAL; PERINEURAL at 15:20

## 2018-03-22 RX ADMIN — CLINDAMYCIN PHOSPHATE 900 MG: 18 INJECTION, SOLUTION INTRAVENOUS at 13:36

## 2018-03-22 RX ADMIN — HEPARIN SODIUM 5100 UNITS: 1000 INJECTION, SOLUTION INTRAVENOUS; SUBCUTANEOUS at 15:32

## 2018-03-22 RX ADMIN — MIDAZOLAM 1 MG: 1 INJECTION INTRAMUSCULAR; INTRAVENOUS at 14:50

## 2018-03-22 NOTE — IP AVS SNAPSHOT
[address] [id number]                                       After Visit Summary   3/22/2018    Murray Nicholson    MRN: [id number]           After Visit Summary Signature Page     I have received my discharge instructions, and my questions have been answered. I have discussed any challenges I see with this plan with the nurse or doctor.    ..........................................................................................................................................  Patient/Patient Representative Signature      ..........................................................................................................................................  Patient Representative Print Name and Relationship to Patient    ..................................................               ................................................  Date                                            Time    ..........................................................................................................................................  Reviewed by Signature/Title    ...................................................              ..............................................  Date                                                            Time

## 2018-03-22 NOTE — TELEPHONE ENCOUNTER
On 3/16/18 order was signed for glipizide by Dr Tang. Therefore this will be closed    Shauna Clemons, RN, BSN

## 2018-03-22 NOTE — PROGRESS NOTES
1545  Returned to [facility] from IR per cart accompanied by RN.  S/P Tunneled line exchange for dialysis.  Site at right upper chest is  FDI.  Denies any pain now.  1555  Nutrition of peanut butter toast, jefferson crackers, and milk taken well.  No nausea.  1630  Resting, denies any needs.  1655  Discharge instructions reviewed with pt.  Verbally demonstrates understanding of instructions.  1710  DC to care of friend per WC accompanied by staff.  CTRN

## 2018-03-22 NOTE — PROGRESS NOTES
Interventional Radiology Pre-Procedure Sedation Assessment   Time of Assessment: 3:52 PM    Expected Level: Moderate Sedation    Indication: Sedation is required for the following type of Procedure: Venous Access    Sedation and procedural consent: Risks, benefits and alternatives were discussed with Patient    PO Intake: Appropriately NPO for procedure    ASA Class: Class 3 - SEVERE SYSTEMIC DISEASE, DEFINITE FUNCTIONAL LIMITATIONS.    Mallampati: Grade 2:  Soft palate, base of uvula, tonsillar pillars, and portion of posterior pharyngeal wall visible    Lungs: Lungs Clear with good breath sounds bilaterally    Heart: Normal heart sounds and rate    History and physical reviewed and no updates needed. I have reviewed the lab findings, diagnostic data, medications, and the plan for sedation. I have determined this patient to be an appropriate candidate for the planned sedation and procedure and have reassessed the patient IMMEDIATELY PRIOR to sedation and procedure.    Mack Hudson MD

## 2018-03-22 NOTE — IP AVS SNAPSHOT
MRN:[id number]                      After Visit Summary   3/22/2018    Murray Nicholson    MRN: [id number]           Visit Information        Department      3/22/2018 12:37 PM [address] [facility]          Review of your medicines      UNREVIEWED medicines. Ask your doctor about these medicines        Dose / Directions    albuterol 108 (90 BASE) MCG/ACT Inhaler   Commonly known as:  PROAIR HFA/PROVENTIL HFA/VENTOLIN HFA   Used for:  Cough        Dose:  2 puff   Inhale 2 puffs into the lungs every 6 hours as needed for shortness of breath / dyspnea or wheezing   Quantity:  1 Inhaler   Refills:  0       allopurinol 300 MG tablet   Commonly known as:  ZYLOPRIM   Used for:  Chronic gout of wrist, unspecified cause, unspecified laterality, Gout, unspecified cause, unspecified chronicity, unspecified site        Dose:  300 mg   Take 1 tablet (300 mg) by mouth daily   Quantity:  30 tablet   Refills:  2       aspirin 81 MG tablet        Take 1 tablet (81 mg) by mouth at bedtime   Refills:  0       atorvastatin 40 MG tablet   Commonly known as:  LIPITOR   Used for:  CKD (chronic kidney disease) stage 3, GFR 30-59 ml/min, Hyperlipidemia LDL goal <100        Dose:  40 mg   Take 1 tablet (40 mg) by mouth daily   Quantity:  90 tablet   Refills:  3       B-COMPLEX PO        Dose:  1 tablet   Take 1 tablet by mouth daily   Refills:  0       bismuth subsalicylate 262 MG/15ML suspension   Commonly known as:  PEPTO BISMOL        Dose:  15 mL   Take 15 mLs by mouth every 6 hours as needed for indigestion   Refills:  0       calcium acetate (Phos Binder) 667 MG Tabs   Used for:  Hyperphosphatemia        Dose:  1 tablet   Take 1 tablet by mouth 3 times daily (with meals)   Quantity:  180 tablet   Refills:  3       fluticasone 50 MCG/ACT spray   Commonly known as:  FLONASE   Used for:  Nasal congestion        Dose:  1-2 spray   Spray 1-2 sprays into both nostrils daily   Quantity:  16 g   Refills:  11       glipiZIDE 5  MG tablet   Commonly known as:  GLUCOTROL   Indication:  Type 2 Diabetes   Used for:  Type 2 diabetes mellitus with stage 5 chronic kidney disease not on chronic dialysis, without long-term current use of insulin (H)        Dose:  5 mg   Take 1 tablet (5 mg) by mouth daily (with breakfast)   Quantity:  30 tablet   Refills:  1       loratadine 10 MG tablet   Commonly known as:  CLARITIN   Used for:  Hypertensive cardiopathy, SOB (shortness of breath)        Dose:  10 mg   Take 10 mg by mouth daily as needed Reported on 5/3/2017   Refills:  0       losartan 25 MG tablet   Commonly known as:  COZAAR   Used for:  Systolic heart failure, unspecified heart failure chronicity (H)        Take 1/2 tab (12.5 mg) daily   Quantity:  30 tablet   Refills:  3       midodrine HCl 10 MG Tabs   Used for:  Hemodialysis-associated hypotension        Dose:  1 tablet   Take 1 tablet by mouth three times a week   Quantity:  90 tablet   Refills:  11       omeprazole 20 MG CR capsule   Commonly known as:  priLOSEC        Dose:  20 mg   Take 20 mg by mouth daily At night   Refills:  0       traMADol 50 MG tablet   Commonly known as:  ULTRAM   Used for:  Abdominal pain, generalized        Dose:  50 mg   Take 1 tablet (50 mg) by mouth every 6 hours as needed for moderate pain   Quantity:  50 tablet   Refills:  0         CONTINUE these medicines which have NOT CHANGED        Dose / Directions    blood glucose monitoring lancets   Used for:  Type 2 diabetes mellitus with stage 5 chronic kidney disease not on chronic dialysis, without long-term current use of insulin (H)        Use to test blood sugar 2-3 times daily or as directed.  Ok to substitute alternative if insurance prefers.   Quantity:  102 each   Refills:  11       blood glucose monitoring test strip   Commonly known as:  no brand specified   Used for:  Type 2 diabetes mellitus with stage 5 chronic kidney disease not on chronic dialysis, without long-term current use of insulin (H)         Use to test blood sugar 2-3 times daily or as directed.   Quantity:  100 each   Refills:  11       order for DME   Used for:  CKD (chronic kidney disease) stage 5, GFR less than 15 ml/min (H)        Equipment being ordered: Commberenice () Treatment Diagnosis: ESRD on PD Pt has to be connected to PD all night and can not be disconnected, hence impending his mobility to go to the bathroom. At risk for infection if he does not have this equipment.   Quantity:  1 Units   Refills:  0                Protect others around you: Learn how to safely use, store and throw away your medicines at www.disposemymeds.org.         Follow-ups after your visit        Your next 10 appointments already scheduled     Mar 28, 2018 10:30 AM CDT   Cardiac Evaluation with  Cardiac Rehab 1   [facility] [place], Cardiac Rehabilitation ([facility])    [address]  [facility] [address]   [phone]            Mar 28, 2018 11:30 AM CDT   LAB with [facility] LAB GOLD WAITING   [facility]    [address] [address]              Please do not eat 10-12 hours before your appointment if you are coming in fasting for labs on lipids, cholesterol, or glucose (sugar). This does not apply to pregnant women. Water, hot tea and black coffee (with nothing added) are okay. Do not drink other fluids, diet soda or chew gum.            Mar 28, 2018 12:00 PM CDT   Procedure - 2.5 hour with U2A ROOM 6   [address] [facility] [place] ([facility])    [address] [facility] [address]               Mar 28, 2018  1:00 PM CDT   Heart Cath Right Heart Cath with [id number]   [facility] ([facility])    [address] [facility] [address] [facility] [address]   [phone]            Mar 30, 2018 10:00 AM CDT   (Arrive  by 9:45 AM)   SOT CARE COORDINATOR EVAL with Lottie Carpenter RN   [facility] Solid Organ Transplant ([facility])    [address]  [address]  [facility] [address]   [phone]            Mar 30, 2018  1:00 PM CDT   Card Cardpul Stress Tst Adult with UUEKGS   [facility] ELECTROCARDIOLOGY ([facility] [place])    [address]  [facility] [address]               Apr 02, 2018 10:30 AM CDT   SIX MINUTE WALK with UC PFL C, UC PFL 6 MINUTE WALK 1   [facility] Pulmonary Function Testing ([facility])    [address]  3rd Floor  [place] [facility] [address]   [phone]            Apr 02, 2018 12:30 PM CDT   (Arrive by 12:15 PM)   New Patient Visit with Patricia Cates PsyD   [facility] Neuropsychology ([facility])    [address]  3rd Floor  [facility] [facility] [address]   [phone]            Apr 04, 2018 11:30 AM CDT   SHORT with Tono Clay RPH   [facility] ([facility])    [address]  [address]  [place] [id number] [phone]               Care Instructions        Further instructions from your care team                                                                        Interventional Radiology                                                                   Discharge Instructions                                                                FollowingTunneled Catheter Exchange                                                                                                                                                             Your site(s) has been closed with:     The Catheter is sutured  to skin at  insertion site             Tunneled catheter is always covered with a Sterile Transparent dressing    Keep site clean and dry     Cover with an occlusive dressing for showering    Weekly transparent dressing changes or when it  becomes wet or dirty     If there is any oozing or bleeding from the site, apply direct pressure for 5-10 minutes with a gauze pad.  If bleeding continues after 10 minutes call your primary doctor.  If bleeding cannot be controlled with direct pressure, call [phone].    Call your Doctor if:    Bleeding as above    Swelling in your neck or arm    Sudden onset of shortness of breath, lightheadedness, or heart palpitations..    Fever greater than 100.5  F    Other signs of infection such as, redness, tenderness, or drainage from the wound.    If you were given sedation:    We recommend an adult stay with you for the first 24 hours.    No driving or alcoholic beverages for 24 hours.    ADDITIONAL INSTRUCTIONS:          If you are on Coumadin you may restart tonight.  Follow up Coumadin Clinic or Primary Care MD         To have your INR rechecked.           No heavy lifting greater than 10 lbs for 3 days.           May use ice pack for pain or minor swelling for 15 min 3-4 times per day.    [facility] Interventional Radiology Department    Physician: Dr Jordi Hudson                                   Date:March 22, 2018 Thursday      Telephone Numbers:  [phone].....8 am to 4:30 pm   Monday-Friday    Hospital : [phone]....After hours, Weekends, & Holidays.  Ask for the Interventional Radiologist on call.  Someone is on call 24 hours a day.     Emergency Department:  [phone]                                                                                                                                                               Additional Information About Your Visit        MyChart Information     [employer] gives you secure access to your electronic health record. If you see a primary care provider, you can also send messages to your care team and make appointments. If you have questions, please call your primary care clinic.  If you do not have a primary care provider, please call [phone] and they will  "assist you.        Care EveryWhere ID     This is your Care EveryWhere ID. This could be used by other organizations to access your [facility] medical records  [id number]        Your Vitals Were     Blood Pressure Pulse Temperature Respirations Height Weight    117/78 (BP Location: Right arm) 115 97.3  F (36.3  C) (Oral) 16 1.753 m (5' 9\") 74.8 kg (165 lb)    Pulse Oximetry BMI (Body Mass Index)                98% 24.37 kg/m2           Primary Care Provider Office Phone # Fax #    Yahir Turcios [phone]      Equal Access to Services     [facility]: Hadii aad ku hadasho Soomaali, waaxda luqadaha, qaybta kaalmada adeegyada, jose palencia . So [facility] [phone].    ATENCIÓN: Si habla español, tiene a ortiz disposición servicios gratuitos de asistencia lingüística. [facility] [phone].    We comply with applicable federal civil rights laws and [place] laws. We do not discriminate on the basis of race, color, national origin, age, disability, sex, sexual orientation, or gender identity.            Thank you!     Thank you for choosing [facility] for your care. Our goal is always to provide you with excellent care. Hearing back from our patients is one way we can continue to improve our services. Please take a few minutes to complete the written survey that you may receive in the mail after you visit with us. Thank you!             Medication List: This is a list of all your medications and when to take them. Check marks below indicate your daily home schedule. Keep this list as a reference.      Medications           Morning Afternoon Evening Bedtime As Needed    albuterol 108 (90 BASE) MCG/ACT Inhaler   Commonly known as:  PROAIR HFA/PROVENTIL HFA/VENTOLIN HFA   Inhale 2 puffs into the lungs every 6 hours as needed for shortness of breath / dyspnea or wheezing                                allopurinol 300 MG tablet   Commonly known as:  ZYLOPRIM   Take 1 tablet (300 " mg) by mouth daily                                aspirin 81 MG tablet   Take 1 tablet (81 mg) by mouth at bedtime                                atorvastatin 40 MG tablet   Commonly known as:  LIPITOR   Take 1 tablet (40 mg) by mouth daily                                B-COMPLEX PO   Take 1 tablet by mouth daily                                bismuth subsalicylate 262 MG/15ML suspension   Commonly known as:  PEPTO BISMOL   Take 15 mLs by mouth every 6 hours as needed for indigestion                                blood glucose monitoring lancets   Use to test blood sugar 2-3 times daily or as directed.  Ok to substitute alternative if insurance prefers.                                blood glucose monitoring test strip   Commonly known as:  no brand specified   Use to test blood sugar 2-3 times daily or as directed.                                calcium acetate (Phos Binder) 667 MG Tabs   Take 1 tablet by mouth 3 times daily (with meals)                                fluticasone 50 MCG/ACT spray   Commonly known as:  FLONASE   Spray 1-2 sprays into both nostrils daily                                glipiZIDE 5 MG tablet   Commonly known as:  GLUCOTROL   Take 1 tablet (5 mg) by mouth daily (with breakfast)                                loratadine 10 MG tablet   Commonly known as:  CLARITIN   Take 10 mg by mouth daily as needed Reported on 5/3/2017                                losartan 25 MG tablet   Commonly known as:  COZAAR   Take 1/2 tab (12.5 mg) daily                                midodrine HCl 10 MG Tabs   Take 1 tablet by mouth three times a week                                omeprazole 20 MG CR capsule   Commonly known as:  priLOSEC   Take 20 mg by mouth daily At night                                order for DME   Equipment being ordered: Commode () Treatment Diagnosis: ESRD on PD Pt has to be connected to PD all night and can not be disconnected, hence impending his mobility to go to the  bathroom. At risk for infection if he does not have this equipment.                                traMADol 50 MG tablet   Commonly known as:  ULTRAM   Take 1 tablet (50 mg) by mouth every 6 hours as needed for moderate pain

## 2018-03-22 NOTE — DISCHARGE INSTRUCTIONS
Interventional Radiology                                                                   Discharge Instructions                                                                FollowingTunneled Catheter Exchange                                                                                                                                                             Your site(s) has been closed with:     The Catheter is sutured  to skin at  insertion site             Tunneled catheter is always covered with a Sterile Transparent dressing    Keep site clean and dry     Cover with an occlusive dressing for showering    Weekly transparent dressing changes or when it becomes wet or dirty     If there is any oozing or bleeding from the site, apply direct pressure for 5-10 minutes with a gauze pad.  If bleeding continues after 10 minutes call your primary doctor.  If bleeding cannot be controlled with direct pressure, call [phone].    Call your Doctor if:    Bleeding as above    Swelling in your neck or arm    Sudden onset of shortness of breath, lightheadedness, or heart palpitations..    Fever greater than 100.5  F    Other signs of infection such as, redness, tenderness, or drainage from the wound.    If you were given sedation:    We recommend an adult stay with you for the first 24 hours.    No driving or alcoholic beverages for 24 hours.    ADDITIONAL INSTRUCTIONS:          If you are on Coumadin you may restart tonight.  Follow up Coumadin Clinic or Primary Care MD         To have your INR rechecked.           No heavy lifting greater than 10 lbs for 3 days.           May use ice pack for pain or minor swelling for 15 min 3-4 times per day.    [facility] Interventional Radiology Department    Physician: Dr Jordi Hudson                                   Date:March 22, 2018 Thursday      Telephone Numbers:  [phone].....8 am to 4:30 pm    Monday-Friday    [facility] : [phone]....After hours, Weekends, & Holidays.  Ask for the Interventional Radiologist on call.  Someone is on call 24 hours a day.     Emergency Department:  [phone]

## 2018-03-22 NOTE — PROGRESS NOTES
Patient Name: Murray Nicholson  Medical Record Number: [id number]  Today's Date: 3/22/2018    Procedure: Evaluate and exchange of central venous tunneled dialysis catheter  Proceduralist: Dr MAITE Hudson    Sedation start time: 1450  Sedation end time: 1530  Sedation medications administered:  Versed 1mg   Fentanyl  25 mcg  Total sedation time: 40 min    Procedure start time: 1444  Puncture time: 1445  Procedure end time: 1535    Report given to: TONY LANE  : NO    Other Notes: Pt arrived to IR room 2 from . Reviewed conset and pt denies any questions or concerns regarding procedure. Pt positioned supine and monitored per protocol. Post prep but prior to procedure start patient stated he was short of breath and wanted to sit up.  VSS.  Drapes removed, oxygen started and head of the bed elevated.    Patient anxious and states he gets short of breath easily and had to rest after crossing the street outside of the hospital. Patient again comfortable and re prepped etc. Minimal sedtion given which he tolerated well.MedComp split cath dialysis catheter 16 Azeri x 27 cm exchanged  via RIJ by Dr Hudson.  Catheter is heparin locked and ready to use Pt tolerated procedure without any noted complications. Pt transferred back to

## 2018-03-22 NOTE — PROCEDURES
Interventional Radiology Brief Post Procedure Note    Procedure: IR CVC TUNNEL REVISION RIGHT    Proceduralist: Mack Hudson MD    Assistant: None    Time Out: Prior to the start of the procedure and with procedural staff participation, I verbally confirmed the patient s identity using two indicators, relevant allergies, that the procedure was appropriate and matched the consent or emergent situation, and that the correct equipment/implants were available. Immediately prior to starting the procedure I conducted the Time Out with the procedural staff and re-confirmed the patient s name, procedure, and site/side. (The Joint Commission universal protocol was followed.)  Yes    Medications   Medication Event Details Admin User Admin Time   midazolam (VERSED) injection 0.5-1 mg Medication Given Dose: 1 mg; Route: Intravenous; Comment: Time given Madeline Shane RN 3/22/2018  2:50 PM   fentaNYL (PF) (SUBLIMAZE) injection 25-50 mcg Medication Given Dose: 25 mcg; Route: Intravenous; Comment: Time given Madeline Shane RN 3/22/2018  2:50 PM   heparin 5,000 units in 0.9% sodium chloride 1000 mL Medication New Bag Dose: 5,000 Units; Route: TABLE SOLN Madeline Shane RN 3/22/2018  3:03 PM   lidocaine 1 % 1-30 mL Medication Given by Other Dose: 5 mL; Route: Intradermal; Comment: By Madeline Cohen RN 3/22/2018  3:20 PM   heparin (porcine) injection 5,100 Units Medication Given by Other Dose: 5,100 Units; Route: Intravenous; Scheduled Time:  3:45 PM; Comment: 2.5 ml per red lumen and 2.6 ml per blue lumen.  Given by Madeline Cohen RN 3/22/2018  3:32 PM   iodixanol (VISIPAQUE 320) injection 50 mL Medication Given Dose: 8 mL; Route: Intravenous; Scheduled Time:  3:30 PM Coty Sandoval 3/22/2018  3:33 PM       Sedation: IR Nurse Monitored Care   Post Procedure Summary:  Prior to the start of the procedure and with procedural staff participation, I verbally confirmed the patient s identity using  two indicators, relevant allergies, that the procedure was appropriate and matched the consent or emergent situation, and that the correct equipment/implants were available. Immediately prior to starting the procedure I conducted the Time Out with the procedural staff and re-confirmed the patient s name, procedure, and site/side. (The Joint Commission universal protocol was followed.)  Yes       Sedatives: Fentanyl and Midazolam (Versed)    Vital signs, airway and pulse oximetry were monitored and remained stable throughout the procedure and sedation was maintained until the procedure was complete.  The patient was monitored by staff until sedation discharge criteria were met.    Patient tolerance: Patient tolerated the procedure well with no immediate complications.    Time of sedation in minutes: 30 Minutes minutes from beginning to end of physician one to one monitoring.          Findings: tip in the right atrium.     Estimated Blood Loss: Less than 10 ml    Fluoroscopy Time: 3 minute(s)    SPECIMENS: None    Complications: 1. None     Condition: Stable    Plan: routine    Comments: See dictated procedure note for full details.    Mack Hudson MD

## 2018-03-22 NOTE — PROGRESS NOTES
Patient prepped for tunneled line placement.  Denies pain.  Has  available for post-procedure transport.  H & P current.  Labs pending.  Consent  complete.

## 2018-03-28 ENCOUNTER — APPOINTMENT (OUTPATIENT)
Dept: CARDIOLOGY | Facility: CLINIC | Age: 63
End: 2018-03-28
Attending: INTERNAL MEDICINE
Payer: COMMERCIAL

## 2018-03-28 ENCOUNTER — APPOINTMENT (OUTPATIENT)
Dept: LAB | Facility: CLINIC | Age: 63
End: 2018-03-28
Attending: INTERNAL MEDICINE
Payer: COMMERCIAL

## 2018-03-28 ENCOUNTER — HOSPITAL ENCOUNTER (OUTPATIENT)
Facility: CLINIC | Age: 63
Discharge: HOME | End: 2018-03-28
Attending: INTERNAL MEDICINE | Admitting: INTERNAL MEDICINE
Payer: COMMERCIAL

## 2018-03-28 ENCOUNTER — APPOINTMENT (OUTPATIENT)
Dept: MEDSURG UNIT | Facility: CLINIC | Age: 63
End: 2018-03-28
Attending: INTERNAL MEDICINE
Payer: COMMERCIAL

## 2018-03-28 VITALS
RESPIRATION RATE: 20 BRPM | DIASTOLIC BLOOD PRESSURE: 63 MMHG | TEMPERATURE: 97.8 F | OXYGEN SATURATION: 100 % | SYSTOLIC BLOOD PRESSURE: 106 MMHG

## 2018-03-28 DIAGNOSIS — I50.20 SYSTOLIC HEART FAILURE (H): ICD-10-CM

## 2018-03-28 LAB — GLUCOSE BLDC GLUCOMTR-MCNC: 153 MG/DL (ref 70–99)

## 2018-03-28 PROCEDURE — 93451 RIGHT HEART CATH: CPT

## 2018-03-28 PROCEDURE — 93451 RIGHT HEART CATH: CPT | Mod: 26 | Performed by: INTERNAL MEDICINE

## 2018-03-28 PROCEDURE — 27210787 ZZH MANIFOLD CR2

## 2018-03-28 PROCEDURE — 82962 GLUCOSE BLOOD TEST: CPT

## 2018-03-28 PROCEDURE — 27210982 ZZH KIT RT HC TOTES DISP CR7

## 2018-03-28 PROCEDURE — 27211181 ZZH BALLOON TIP PRESSURE CR5

## 2018-03-28 PROCEDURE — 25000125 ZZHC RX 250: Performed by: INTERNAL MEDICINE

## 2018-03-28 PROCEDURE — 40000166 ZZH STATISTIC PP CARE STAGE 1

## 2018-03-28 RX ORDER — LIDOCAINE 40 MG/G
CREAM TOPICAL
Status: COMPLETED | OUTPATIENT
Start: 2018-03-28 | End: 2018-03-28

## 2018-03-28 RX ADMIN — LIDOCAINE: 40 CREAM TOPICAL at 13:31

## 2018-03-28 NOTE — DISCHARGE INSTRUCTIONS
[facility]                        Interventional Cardiology  Discharge Instructions   Post Right Heart Cath      AFTER YOU GO HOME:    DO drink plenty of fluids    DO resume your regular diet and medications unless otherwise instructed by your Primary Physician    Do Not scrub the procedure site vigorously    No lotion or powder to the puncture site for 3 days    CALL YOUR PRIMARY PHYSICIAN IF: You may resume all normal activity.  Monitor neck site for bleeding, swelling, or voice changes. If you notice bleeding or swelling immediately apply pressure to the site and call number below to speak with Cardiology Fellow.  If you experience any changes in your breathing you should call your doctor immediately or come to the closest Emergency Department.  Do not drive yourself.    ADDITIONAL INSTRUCTIONS: Medications: You are to resume all home medications including anticoagulation therapy unless otherwise advised by your primary cardiologist or nurse coordinator.    Follow Up: Per your primary cardiology team    If you have any questions or concerns regarding your procedure site please call [phone] at anytime and ask for Cardiology Fellow on call.  They are available 24 hours a day.  You may also contact the Cardiology Clinic after hours number at [phone].                                                       Telephone Numbers [phone] Monday-Friday 8:00 am to 4:30 pm    [phone] After 4:30 pm Monday-Friday, Weekends & Holidays  Ask for Interventional Cardiologist on call. Someone is on call 24 hours/day   [facility] toll free number [phone] Monday-Friday 8:00 am to 4:30 pm   [facility] Emergency Dept [phone]

## 2018-03-28 NOTE — PROGRESS NOTES
1510:  Reviewed discharge instructions.  Copy given to patient. 1515:  Discharged to home.  Patient walked self to vehicle.

## 2018-03-28 NOTE — IP AVS SNAPSHOT
MRN:[id number]                      After Visit Summary   3/28/2018    Murray Nicholson    MRN: [id number]           Visit Information        Department      3/28/2018 11:55 AM [address] [facility]          Review of your medicines      UNREVIEWED medicines. Ask your doctor about these medicines        Dose / Directions    albuterol 108 (90 BASE) MCG/ACT Inhaler   Commonly known as:  PROAIR HFA/PROVENTIL HFA/VENTOLIN HFA   Used for:  Cough        Dose:  2 puff   Inhale 2 puffs into the lungs every 6 hours as needed for shortness of breath / dyspnea or wheezing   Quantity:  1 Inhaler   Refills:  0       allopurinol 300 MG tablet   Commonly known as:  ZYLOPRIM   Used for:  Chronic gout of wrist, unspecified cause, unspecified laterality, Gout, unspecified cause, unspecified chronicity, unspecified site        Dose:  300 mg   Take 1 tablet (300 mg) by mouth daily   Quantity:  30 tablet   Refills:  2       aspirin 81 MG tablet        Take 1 tablet (81 mg) by mouth at bedtime   Refills:  0       atorvastatin 40 MG tablet   Commonly known as:  LIPITOR   Used for:  CKD (chronic kidney disease) stage 3, GFR 30-59 ml/min, Hyperlipidemia LDL goal <100        Dose:  40 mg   Take 1 tablet (40 mg) by mouth daily   Quantity:  90 tablet   Refills:  3       B-COMPLEX PO        Dose:  1 tablet   Take 1 tablet by mouth daily   Refills:  0       bismuth subsalicylate 262 MG/15ML suspension   Commonly known as:  PEPTO BISMOL        Dose:  15 mL   Take 15 mLs by mouth every 6 hours as needed for indigestion   Refills:  0       calcium acetate (Phos Binder) 667 MG Tabs   Used for:  Hyperphosphatemia        Dose:  1 tablet   Take 1 tablet by mouth 3 times daily (with meals)   Quantity:  180 tablet   Refills:  3       fluticasone 50 MCG/ACT spray   Commonly known as:  FLONASE   Used for:  Nasal congestion        Dose:  1-2 spray   Spray 1-2 sprays into both nostrils daily   Quantity:  16 g   Refills:  11       glipiZIDE 5  MG tablet   Commonly known as:  GLUCOTROL   Indication:  Type 2 Diabetes   Used for:  Type 2 diabetes mellitus with stage 5 chronic kidney disease not on chronic dialysis, without long-term current use of insulin (H)        Dose:  5 mg   Take 1 tablet (5 mg) by mouth daily (with breakfast)   Quantity:  30 tablet   Refills:  1       loratadine 10 MG tablet   Commonly known as:  CLARITIN   Used for:  Hypertensive cardiopathy, SOB (shortness of breath)        Dose:  10 mg   Take 10 mg by mouth daily as needed Reported on 5/3/2017   Refills:  0       losartan 25 MG tablet   Commonly known as:  COZAAR   Used for:  Systolic heart failure, unspecified heart failure chronicity (H)        Take 1/2 tab (12.5 mg) daily   Quantity:  30 tablet   Refills:  3       midodrine HCl 10 MG Tabs   Used for:  Hemodialysis-associated hypotension        Dose:  1 tablet   Take 1 tablet by mouth three times a week   Quantity:  90 tablet   Refills:  11       omeprazole 20 MG CR capsule   Commonly known as:  priLOSEC        Dose:  20 mg   Take 20 mg by mouth daily At night   Refills:  0       traMADol 50 MG tablet   Commonly known as:  ULTRAM   Used for:  Abdominal pain, generalized        Dose:  50 mg   Take 1 tablet (50 mg) by mouth every 6 hours as needed for moderate pain   Quantity:  50 tablet   Refills:  0         CONTINUE these medicines which have NOT CHANGED        Dose / Directions    blood glucose monitoring lancets   Used for:  Type 2 diabetes mellitus with stage 5 chronic kidney disease not on chronic dialysis, without long-term current use of insulin (H)        Use to test blood sugar 2-3 times daily or as directed.  Ok to substitute alternative if insurance prefers.   Quantity:  102 each   Refills:  11       blood glucose monitoring test strip   Commonly known as:  no brand specified   Used for:  Type 2 diabetes mellitus with stage 5 chronic kidney disease not on chronic dialysis, without long-term current use of insulin (H)         Use to test blood sugar 2-3 times daily or as directed.   Quantity:  100 each   Refills:  11       order for DME   Used for:  CKD (chronic kidney disease) stage 5, GFR less than 15 ml/min (H)        Equipment being ordered: Commode () Treatment Diagnosis: ESRD on PD Pt has to be connected to PD all night and can not be disconnected, hence impending his mobility to go to the bathroom. At risk for infection if he does not have this equipment.   Quantity:  1 Units   Refills:  0                Protect others around you: Learn how to safely use, store and throw away your medicines at www.disposemymeds.org.         Follow-ups after your visit        Your next 10 appointments already scheduled     Mar 30, 2018 10:00 AM CDT   (Arrive by 9:45 AM)   SOT CARE COORDINATOR EVAL with Lottie Carpenter RN   [facility] Solid Organ Transplant ([facility])    [address] [place] [address]  [address] [place] [address]   [phone]            Mar 30, 2018  1:00 PM CDT   Card Cardpul Stress Tst Adult with UUEKGS   [facility] ([facility] [place])    [address] [facility] [address]               Apr 02, 2018 10:30 AM CDT   SIX MINUTE WALK with UC PFL C, UC PFL 6 MINUTE WALK 1   [facility] Pulmonary Function Testing ([facility])    [address] [facility]  [address] [facility] [address]   [phone]            Apr 02, 2018 12:30 PM CDT   (Arrive by 12:15 PM)   New Patient Visit with Patricia Cates PsyD   [facility] Neuropsychology ([facility])    [address] [facility]  [address] [facility] [address]   [phone]            Apr 04, 2018 11:30 AM CDT   SHORT with Tono Clay RPH   [facility] ([facility])    [address] [place] [address] A  [place] [address]   [phone]            Apr 09, 2018 10:30 AM CDT   Cardiac Evaluation with  Cardiac Rehab 1    [facility] [place] Cardiac Rehabilitation ([facility])    [address]  [facility] [address] [phone]            Apr 16, 2018  9:00 AM CDT   US CAROTID BILATERAL with UCUSV2   [facility] US ([facility])    [address] [address] [phone]           Please bring a list of your medicines (including vitamins, minerals and over-the-counter drugs). Also, tell your doctor about any allergies you may have. Wear comfortable clothes and leave your valuables at home.  You do not need to do anything special to prepare for your exam.  Please call the Imaging Department at your exam site with any questions.            Apr 16, 2018 10:00 AM CDT   US LOWER EXTREMITY ARTERIAL DUPLEX BILATERAL with UCUSV2    [facility] ([facility])    [address] [address] [phone]           Please bring a list of your medicines (including vitamins, minerals and over-the-counter drugs). Also, tell your doctor about any allergies you may have. Wear comfortable clothes and leave your valuables at home.  You do not need to do anything special to prepare for your exam.  Please call the Imaging Department at your exam site with any questions.            Apr 16, 2018  1:15 PM CDT   (Arrive by 1:00 PM)   XR CHEST 2 VIEWS with UCXR1   [facility] Xray ([facility])    [address] [place] [address] [phone]           Please bring a list of your current medicines to your exam. (Include vitamins, minerals and over-thecounter medicines.) Leave your valuables at home.  Tell your doctor if there is a chance you may be pregnant.  You do not need to do anything special for this exam.               Care Instructions        Further instructions  from your care team       [facility]                        Interventional Cardiology  Discharge Instructions   Post Right Heart Cath      AFTER YOU GO HOME:    DO drink plenty of fluids    DO resume your regular diet and medications unless otherwise instructed by your Primary Physician    Do Not scrub the procedure site vigorously    No lotion or powder to the puncture site for 3 days    CALL YOUR PRIMARY PHYSICIAN IF: You may resume all normal activity.  Monitor neck site for bleeding, swelling, or voice changes. If you notice bleeding or swelling immediately apply pressure to the site and call number below to speak with Cardiology Fellow.  If you experience any changes in your breathing you should call your doctor immediately or come to the closest Emergency Department.  Do not drive yourself.    ADDITIONAL INSTRUCTIONS: Medications: You are to resume all home medications including anticoagulation therapy unless otherwise advised by your primary cardiologist or nurse coordinator.    Follow Up: Per your primary cardiology team    If you have any questions or concerns regarding your procedure site please call [phone] at anytime and ask for Cardiology Fellow on call.  They are available 24 hours a day.  You may also contact the Cardiology Clinic after hours number at [phone].                                                       Telephone Numbers [phone] Monday-Friday 8:00 am to 4:30 pm    [phone] After 4:30 pm Monday-Friday, Weekends & Holidays  Ask for Interventional Cardiologist on call. Someone is on call 24 hours/day   [facility] toll free number [phone] Monday-Friday 8:00 am to 4:30 pm   [facility] Emergency Dept [phone]                    Additional Information About Your Visit        [employer] Information     [employer] gives you secure access to your electronic health record. If you see a primary care provider, you can also send messages to your care team and  make appointments. If you have questions, please call your primary care clinic.  If you do not have a primary care provider, please call [phone] and they will assist you.        Care EveryWhere ID     This is your Care EveryWhere ID. This could be used by other organizations to access your [facility] medical records  [id number]        Your Vitals Were     Blood Pressure Temperature Respirations Pulse Oximetry          106/63 97.8  F (36.6  C) (Oral) 20 100%         Primary Care Provider Office Phone # Fax #    Yahir Turcios [phone]      Equal Access to Services     [facility]: Hadii aad ku hadasho Soomaali, waaxda luqadaha, qaybta kaalmada adedorothyyamerry, jose palencia . So [facility] [phone].    ATENCIÓN: Si habla español, tiene a ortiz disposición servicios gratuitos de asistencia lingüística. Llame al [phone].    We comply with applicable federal civil rights laws and [place] laws. We do not discriminate on the basis of race, color, national origin, age, disability, sex, sexual orientation, or gender identity.            Thank you!     Thank you for choosing [facility] for your care. Our goal is always to provide you with excellent care. Hearing back from our patients is one way we can continue to improve our services. Please take a few minutes to complete the written survey that you may receive in the mail after you visit with us. Thank you!             Medication List: This is a list of all your medications and when to take them. Check marks below indicate your daily home schedule. Keep this list as a reference.      Medications           Morning Afternoon Evening Bedtime As Needed    albuterol 108 (90 BASE) MCG/ACT Inhaler   Commonly known as:  PROAIR HFA/PROVENTIL HFA/VENTOLIN HFA   Inhale 2 puffs into the lungs every 6 hours as needed for shortness of breath / dyspnea or wheezing                                allopurinol 300 MG tablet   Commonly known  as:  ZYLOPRIM   Take 1 tablet (300 mg) by mouth daily                                aspirin 81 MG tablet   Take 1 tablet (81 mg) by mouth at bedtime                                atorvastatin 40 MG tablet   Commonly known as:  LIPITOR   Take 1 tablet (40 mg) by mouth daily                                B-COMPLEX PO   Take 1 tablet by mouth daily                                bismuth subsalicylate 262 MG/15ML suspension   Commonly known as:  PEPTO BISMOL   Take 15 mLs by mouth every 6 hours as needed for indigestion                                blood glucose monitoring lancets   Use to test blood sugar 2-3 times daily or as directed.  Ok to substitute alternative if insurance prefers.                                blood glucose monitoring test strip   Commonly known as:  no brand specified   Use to test blood sugar 2-3 times daily or as directed.                                calcium acetate (Phos Binder) 667 MG Tabs   Take 1 tablet by mouth 3 times daily (with meals)                                fluticasone 50 MCG/ACT spray   Commonly known as:  FLONASE   Spray 1-2 sprays into both nostrils daily                                glipiZIDE 5 MG tablet   Commonly known as:  GLUCOTROL   Take 1 tablet (5 mg) by mouth daily (with breakfast)                                loratadine 10 MG tablet   Commonly known as:  CLARITIN   Take 10 mg by mouth daily as needed Reported on 5/3/2017                                losartan 25 MG tablet   Commonly known as:  COZAAR   Take 1/2 tab (12.5 mg) daily                                midodrine HCl 10 MG Tabs   Take 1 tablet by mouth three times a week                                omeprazole 20 MG CR capsule   Commonly known as:  priLOSEC   Take 20 mg by mouth daily At night                                order for DME   Equipment being ordered: Commode () Treatment Diagnosis: ESRD on PD Pt has to be connected to PD all night and can not be disconnected, hence  impending his mobility to go to the bathroom. At risk for infection if he does not have this equipment.                                traMADol 50 MG tablet   Commonly known as:  ULTRAM   Take 1 tablet (50 mg) by mouth every 6 hours as needed for moderate pain

## 2018-03-28 NOTE — IP AVS SNAPSHOT
[address] [address]                                       After Visit Summary   3/28/2018    Murray Nicholson    MRN: [id number]           After Visit Summary Signature Page     I have received my discharge instructions, and my questions have been answered. I have discussed any challenges I see with this plan with the nurse or doctor.    ..........................................................................................................................................  Patient/Patient Representative Signature      ..........................................................................................................................................  Patient Representative Print Name and Relationship to Patient    ..................................................               ................................................  Date                                            Time    ..........................................................................................................................................  Reviewed by Signature/Title    ...................................................              ..............................................  Date                                                            Time

## 2018-03-28 NOTE — PROGRESS NOTES
Pt has returned to unit 2a s/p RHC. Left neck site CDI, flat, soft, non tender. Dr Ernst currently at bedside.

## 2018-03-29 NOTE — PROCEDURES
CARDIAC CATH REPORT:     PROCEDURES PERFORMED:   Right Heart Catheterization    PHYSICIANS:  Attending Physician: John Minaya MD    INDICATION:  Murray Nicholson is a 63 year old male with HFrEF, bicuspid AV here for hemodynamic evaluation as part of transplant evaluation and prior to possible AV replacement.    DESCRIPTION:  1. Consent obtained with discussion of risks.  All questions were answered.  2. Sterile prep and procedure.  3. Location with Sheaths:   Lf Femoral Venous  7 Fr 10 cm [short]. Left side was selected as patient had recent RIJ tunneled line placed.  4. Access: Local anesthetic with lidocaine.  A micropuncture 21 gauge needle with ultrasound guidance was used to establish vascular access using a modified Seldinger technique.  5. Diagnostic Catheters: 7 Fr  Ruth Jax  6. Estimated blood loss: < 5 ml    MEDICATIONS:  Heart rate, BP, respiration, oxygen saturation and patient responses were monitored throughout the procedure with the assistance of the RN under my supervision.    HEMODYNAMICS:  HR: 115/min  RA: 12/10/9   RV: 52/16mmHg  PA: 50/30/36mmHg   PCWP: 32mmHg   PA sat: 45.7%   PCWP sat: 97.7%  Kassi CO/CI: 3.0L/min/1.6LPM/M2  TD CO/CI: 2.7LPM/1.5LPM/M2   PVR: 1.34   MAP: 82mmHg    Sheath Removal:  The LIJ sheath was manually removed in the cardiac catheterization laboratory.    Contrast: Isovue, 0 ml     Fluoroscopy Time: 1.2 min    COMPLICATIONS:  1. None    SUMMARY:   - Mildly elevated right sided filling pressures  - Moderate secondary pulmonary hypertension (group II)  - Elevated left sided filling pressures  - Reduced cardiac index    PLAN:    >>. Discharge today per protocol  >>  FU with Dr. Ernst    Findings discussed with Klever.    See CVIS report for final draft.    John Minaya MD  Cardiology Staff

## 2018-03-30 ENCOUNTER — HOSPITAL ENCOUNTER (OUTPATIENT)
Dept: CARDIOLOGY | Facility: CLINIC | Age: 63
Discharge: HOME | End: 2018-03-30
Attending: INTERNAL MEDICINE | Admitting: INTERNAL MEDICINE
Payer: COMMERCIAL

## 2018-03-30 ENCOUNTER — APPOINTMENT (OUTPATIENT)
Dept: TRANSPLANT | Facility: CLINIC | Age: 63
End: 2018-03-30
Attending: INTERNAL MEDICINE
Payer: COMMERCIAL

## 2018-03-30 DIAGNOSIS — I50.20 SYSTOLIC HEART FAILURE (H): ICD-10-CM

## 2018-03-30 PROCEDURE — 94621 CARDIOPULM EXERCISE TESTING: CPT | Performed by: INTERNAL MEDICINE

## 2018-03-30 PROCEDURE — 94621 CARDIOPULM EXERCISE TESTING: CPT | Mod: 26 | Performed by: INTERNAL MEDICINE

## 2018-04-01 ENCOUNTER — MYC MEDICAL ADVICE (OUTPATIENT)
Dept: PHARMACY | Facility: CLINIC | Age: 63
End: 2018-04-01

## 2018-04-01 NOTE — PROGRESS NOTES
Dear Tres Turcios and Vida,    The had the pleasure of seeing Mr. Gao had her into her advanced heart failure and transplant clinic continues to [facility].  Although you are familiar with his history please allow me to summarize it for the purpose of our records.    Mr. Haines is a very pleasant 63-year-old gentleman was referred to us for consideration of advanced heart failure option.  He was recently hospitalized after noted to have severe reduction in his ejection fraction with severely elevated biventricular filling pressures and low cardiac index.  This was noted during her elective coronary angiogram to evaluate his coronary arteries.  He was hemodialyzed aggressively in the hospital to reduce his filling pressures.  However, due to hypotension, he was not able to tolerate any of his afterload reduction therapy.  He was discharged on a low-dose of Cozaar 25 mg daily.  He could not even tolerate this dose.  In fact he had to use Midrin prior to his dialysis to remove volume.  In light of this progressively worsening left heart dysfunction he was referred to us to be considered for a heart kidney transplantation.    Of note his past medical history is significant for type 2 diabetes mellitus for which he is on glipizide for the last 15 years.  He also has a history of hypertension hyperlipidemia.  He was diagnosed with bicuspid aortic valve and aortopathy that was diagnosed approximately 5 years ago.  He was initially following up with Dr. nadege michelle after his departure with Dr. Montrell Posada.  He has been known to have aortic regurgitation which has been closely followed.  His last transesophageal echocardiogram from November 2016 showed moderate aortic regurgitation with an estimated ejection fraction of 35-40%.  At that time his aortic regurgitation was considered to be not severe enough to explain his LV systolic dysfunction.    Mr. Nicholson has chronic kidney disease from his  long-standing diabetes mellitus.  He has been on hemodialysis since fall 2017.  He was initially considered for a kidney transplantation however due to noncompliance was taken off the list.  He was hospitalized in January 2018 for a peritoneal infection from his PD catheter.  He has been on hemodialysis since January 2018 following his peritonitis.  He has had more hypotension with hemodialysis.    Mr. Haines states that he has had low energy for a long time dating as far as 10 years ago.  He has been hospitalized twice in the last 5 years for what appears to be CHF exacerbation with shortness of breath PND and orthopnea.  He has been having progressively worsening exertional shortness of breath in the last year or so.  Is currently not able to do more than his usual activity.  He has to stop before he can walk more than a block.  Because these episodes as panic episodes which appeared to be more of exertional shortness of breath.  He has not had any lightheadedness dizziness or syncope.  He denies having any chest pain or chest pressure.  He has no lower extremity swelling or abdominal distention.  Lately they have been having difficulty dialyzing him due to hypotension and he is requiring prior to his dialysis.  I would currently categorize him as NYHA functional class IIIb.    Past medical history    #1 type 2 diabetes mellitus next number  2.  Hyperlipidemia  3.  Hypertension  4.  Bicuspid aortic valve with aortic regurgitation  5.  Chronic kidney disease  6.  Gout  7.  End-stage renal disease requiring dialysis -initially on PD now on HD    Past surgical history  #1 hernia repair  #2 PD catheter placement    Medications  Current Outpatient Prescriptions   Medication Sig     blood glucose monitoring (NO BRAND SPECIFIED) test strip Use to test blood sugar 2-3 times daily or as directed.     losartan (COZAAR) 25 MG tablet Take 1/2 tab (12.5 mg) daily     midodrine HCl 10 MG TABS Take 1 tablet by mouth three times  a week     traMADol (ULTRAM) 50 MG tablet Take 1 tablet (50 mg) by mouth every 6 hours as needed for moderate pain     B Complex-Biotin-FA (B-COMPLEX PO) Take 1 tablet by mouth daily     bismuth subsalicylate (PEPTO BISMOL) 262 MG/15ML suspension Take 15 mLs by mouth every 6 hours as needed for indigestion     calcium acetate, Phos Binder, 667 MG TABS Take 1 tablet by mouth 3 times daily (with meals)     albuterol (PROAIR HFA/PROVENTIL HFA/VENTOLIN HFA) 108 (90 BASE) MCG/ACT Inhaler Inhale 2 puffs into the lungs every 6 hours as needed for shortness of breath / dyspnea or wheezing     fluticasone (FLONASE) 50 MCG/ACT spray Spray 1-2 sprays into both nostrils daily     atorvastatin (LIPITOR) 40 MG tablet Take 1 tablet (40 mg) by mouth daily     omeprazole (PRILOSEC) 20 MG CR capsule Take 20 mg by mouth daily At night     loratadine (CLARITIN) 10 MG tablet Take 10 mg by mouth daily as needed Reported on 5/3/2017     ASPIRIN 81 MG OR TABS Take 1 tablet (81 mg) by mouth at bedtime     blood glucose monitoring (ACCU-CHEK FASTCLIX) lancets Use to test blood sugar 2-3 times daily or as directed.  Ok to substitute alternative if insurance prefers.     glipiZIDE (GLUCOTROL) 5 MG tablet Take 1 tablet (5 mg) by mouth daily (with breakfast)     allopurinol (ZYLOPRIM) 300 MG tablet Take 1 tablet (300 mg) by mouth daily     order for DME Equipment being ordered: Carol ()  Treatment Diagnosis: ESRD on PD  Pt has to be connected to PD all night and can not be disconnected, hence impending his mobility to go to the bathroom. At risk for infection if he does not have this equipment.     No current facility-administered medications for this visit.      Review of system:  A detailed 10 point review of system obtained as described in history of present illness all other systems reviewed and are negative    Personal history:  He quit smoking he drinks alcohol occasionally he used to smoke marijuana however his quit this now.  He  denies using cocaine and heroin or any other illicit drugs.    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  He is .  His 2 grown adult children arrangements involved in his care lately and are willing to help.    Family history:  No significant family history of premature coronary artery disease sudden cardiac death or pulmonary hypertension.    Examination:  He was in no apparent distress.  His blood pressure was 97/64, pulse rate was 115, respiratory rate was 16 and oxygen saturation was 98% on room air.  His weight was 78 kg.  He had no jugular venous distention.  He had no paler cyanosis or jaundice.  His carotids were 1+ bilaterally.  His pulse was regular rate and rhythm.  Cardiac auscultation revealed a normal S1 soft S2 and an early diastolic murmur in the aortic area.   Auscultation of his lungs reveal equal air entry on both sides with no added sounds.  His abdomen was soft with normal bowel sounds no tenderness no rigidity no guarding. He had no focal neurological deficit.  His extremities showed no edema.    EKG:    Sinus tachycardia, left axis deviation, and left ventricular hypertrophy with strain pattern.    Echocardiogram:  His last echocardiogram from February showed severely reduced left ventricular ejection fraction with an estimated EF of 10-15%.  He had severe left ventricular dilatation with left ventricular end-diastolic diameter of 7.9 cm.  His right ventricle is moderately reduced in function.  He had a bicuspid aortic valve.  He had moderate-severe eccentric aortic regurgitation. He had moderate mitral insufficiency.  His right ventricular systolic pressure was elevated at 31 mmHg above the right atrial pressure.  His IVC was normal in size.  His sinotubular ridge was effaced.  His aortic ascending aorta was dilated at 4.2 cm.  Compared to his prior study his aortic regurgitation the LV RV  function and LV dilatation had very worsened.      Coronary angiogram:  Mild nonobstructive disease.  He had a mid 50% RCA lesion with an FFR of 0.89.  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    Right heart catheterization:  His artery pressure was 1 mmHg.  His RV was 27/1.  His PA was 27/16 with a mean of 22 mmHg.  His pulmonary capillary wedge pressure was 15 mmHg.  His PA saturation was 44.7.  His Kassi cardiac output was 3.1 with an index of 1.6.  His PVR was 3.  This was after aggressive hemodialysis in the hospital.    CBC RESULTS:   Recent Labs   Lab Test  03/07/18   0833   WBC  6.9   RBC  3.41*   HGB  11.1*   HCT  34.7*   MCV  102*   MCH  32.6   MCHC  32.0   RDW  18.1*   PLT  257     Recent Labs   Lab Test  03/22/18   1330  03/15/18   1244   NA  135  137   POTASSIUM  3.3*  3.9   CHLORIDE  103  103   CO2  19*  23   ANIONGAP  13  12   GLC  135*  116*   BUN  24  21   CR  3.94*  4.09*   TRINI  9.0  8.8     Liver Function Studies -   Recent Labs   Lab Test  03/07/18   0833  02/19/18   1558   PROTTOTAL   --   7.2   ALBUMIN   --   2.7*   BILITOTAL  0.7  0.6   ALKPHOS  129  102   AST  18  18   ALT  21  15     Assessment and Plan:    Mr. Nicholson is a very pleasant 63-year-old gentleman with past medical history significant for hypertension hyperlipidemia and type 2 diabetes mellitus, end-stage renal disease requiring hemodialysis, bicuspid aortic valve, moderate to severe aortic regurgitation severe LV systolic dysfunction.    He has been having progressively worsening LV systolic dysfunction.  His ejection fraction is now 10-15%.  He is more symptomatic.  He is functional class IIIB.  He has been having hypotension with inability to complete his dialysis requiring midodrine prior to dialysis.  His volume status has not been an issue due to aggressive dialysis.      The etiology of the LV systolic dysfunction is unclear, however, I suspect that this is very likely secondary to  his aortic valve disease.  His aortic regurgitation is eccentric and it is very difficult to assess the severity of his aortic regurgitation accurately. He does not have coronary artery disease to explain his progressively worsening LV systolic dysfunction.  His blood pressure has been lately been well controlled.      In light of this I requested him to have a repeat transesophageal echocardiogram.  I personally reviewed his echocardiogram and discussed this with Dr. Mccarthy.  Now, he has severe aortic regurgitation which is eccentric in nature.  This has worsened when compared to November 2016.    Thus I suspect that his worsening LV systolic dysfunction is very likely due to his aortic regurgitation.  Although his cardiac index is low in patients with aortic valve disease, in general, it improve with aortic valve replacement.  Before considering him for cardiac transplant I think I would be important to consider this option. I have discussed his case with Dr. Caputo, who is willing to see him in the next 1-2 weeks.  We will arrange for him to have a repeat right heart catheterization.  He will likely have an LVAD as a backup strategy as this is a high risk surgery with severely reduced ejection fraction and cardiac index.  I discussed the plan of care with the patient was also agreeable.  We will aim for aortic valve replacement with a balloon pump and hopefully he will come off the surgery with no difficulties.  If he were to have difficulties he will need an LVAD as a bridge to transplant.    It was pleasure meeting Brigitte Julita bonilla in the advanced of the lung transplant clinic.  We thank you for involving us in his care.  We will keep you apprised of his treatment plan and progress.    Sincerely,  Klever Ernst MD   [facility]  Heart Failure, Transplant, and Mechanical Circulatory Support Cardiology   Cardiovascular Division  [facility]    [phone]

## 2018-04-02 ENCOUNTER — OFFICE VISIT (OUTPATIENT)
Dept: NEUROPSYCHOLOGY | Facility: CLINIC | Age: 63
End: 2018-04-02
Payer: COMMERCIAL

## 2018-04-02 DIAGNOSIS — Z01.818 PRE-TRANSPLANT EVALUATION FOR HEART TRANSPLANT: ICD-10-CM

## 2018-04-02 DIAGNOSIS — I50.20 SYSTOLIC HEART FAILURE (H): ICD-10-CM

## 2018-04-02 DIAGNOSIS — F09 COGNITIVE DISORDER: Primary | ICD-10-CM

## 2018-04-02 DIAGNOSIS — I50.20 SYSTOLIC CONGESTIVE HEART FAILURE, UNSPECIFIED CONGESTIVE HEART FAILURE CHRONICITY: ICD-10-CM

## 2018-04-02 DIAGNOSIS — I50.9 HEART FAILURE (H): Primary | ICD-10-CM

## 2018-04-02 LAB
6 MIN WALK (FT): 840 FT
6 MIN WALK (M): 256 M

## 2018-04-02 NOTE — MR AVS SNAPSHOT
After Visit Summary   4/2/2018    Murray Nicholson    MRN: [id number]           Patient Information     Date Of Birth          1955        Visit Information        Provider Department      4/2/2018 12:30 PM Patricia Cates PsyD [facility]        Today's Diagnoses     Cognitive disorder    -  1    Systolic congestive heart failure, unspecified congestive heart failure chronicity (H)        Pre-transplant evaluation for heart transplant           Follow-ups after your visit        Your next 10 appointments already scheduled     Apr 05, 2018  2:30 PM CDT   (Arrive by 2:15 PM)   New Patient Visit with Rony Caputo MD   [facility] ([facility])    [address]  [address]  [facility] [address]   [phone]            Apr 09, 2018 10:30 AM CDT   Cardiac Evaluation with  Cardiac Rehab 1   [facility] [place] ([facility])    [address]st [address]  [facility] [address] [phone]            Apr 16, 2018  8:15 AM CDT   US ABDOMEN COMPLETE with UCUS2   [facility] US ([facility])    [address]  1st Floor  [facility] [address] [phone]           Please bring a list of your medicines (including vitamins, minerals and over-the-counter drugs). Also, tell your doctor about any allergies you may have. Wear comfortable clothes and leave your valuables at home.  Adults: No eating or drinking for 8 hours before the exam. You may take medicine with a small sip of water.  Children: - Children 6+ years: No food or drink for 6 hours before exam. - Children 1-5 years: No food or drink for 4 hours before exam. - Infants, breast-fed: may have breast milk up to 2 hours before exam. - Infants, formula: may have bottle until 4 hours before exam.  Please call the Imaging Department at your exam site  with any questions.            Apr 16, 2018  9:00 AM CDT   US CAROTID BILATERAL with UCUSV2   [facility] ([facility])    [address] [place] [id number] [phone]           Please bring a list of your medicines (including vitamins, minerals and over-the-counter drugs). Also, tell your doctor about any allergies you may have. Wear comfortable clothes and leave your valuables at home.  You do not need to do anything special to prepare for your exam.  Please call the Imaging Department at your exam site with any questions.            Apr 16, 2018 10:00 AM CDT   US LOWER EXTREMITY ARTERIAL DUPLEX BILATERAL with UCUSV2   [facility] ([facility])    [address] [address] [phone]           Please bring a list of your medicines (including vitamins, minerals and over-the-counter drugs). Also, tell your doctor about any allergies you may have. Wear comfortable clothes and leave your valuables at home.  You do not need to do anything special to prepare for your exam.  Please call the Imaging Department at your exam site with any questions.            Apr 16, 2018 11:00 AM CDT   US ANDRE DOPPLER NO EXERCISE 1-2 LVLS BILAT with UCUSV2   [facility] ([facility])    [address] [place] [address] [phone]           Please bring a list of your medicines (including vitamins, minerals and over-the-counter drugs). Also, tell your doctor about any allergies you may have. Wear comfortable clothes and leave your valuables at home.  No caffeine or tobacco for 1 hour prior to exam.  Please call the Imaging Department at your exam site with any questions.            Apr 16, 2018  1:15 PM CDT   (Arrive by 1:00 PM)   XR CHEST 2 VIEWS with UCXR1   [facility] Xray ([facility])     [address] [place] [id number] [phone]           Please bring a list of your current medicines to your exam. (Include vitamins, minerals and over-thecounter medicines.) Leave your valuables at home.  Tell your doctor if there is a chance you may be pregnant.  You do not need to do anything special for this exam.            Apr 16, 2018  1:30 PM CDT   DX HIP/PELVIS/SPINE with UCDX1   [facility] ([facility])    [address] [place], [place] [address] [phone]           Please do not take any of the following 24 hours prior to the day of your exam: vitamins, calcium tablets, antacids.  If possible, please wear clothes without metal (snaps, zippers). A sweatsuit works well.            Apr 16, 2018  2:00 PM CDT   CT HEAD W/O CONTRAST with UCCT2   [facility] CT ([facility])    [address] [place] [address] [phone]           Please bring any scans or X-rays taken at other hospitals, if similar tests were done. Also bring a list of your medicines, including vitamins, minerals and over-the-counter drugs. It is safest to leave personal items at home.  Be sure to tell your doctor:   If you have any allergies.   If there s any chance you are pregnant.   If you are breastfeeding.  You do not need to do anything special to prepare for this exam.  Please wear loose clothing, such as a sweat suit or jogging clothes. Avoid snaps, zippers and other metal. We may ask you to undress and put on a hospital gown.            Apr 16, 2018  2:30 PM CDT   (Arrive by 2:15 PM)   New Patient Visit with Sonny Johnson MD   [facility] ([facility])    [address] [place] [address]  [address]  [facility] [id number] [phone]              Who to contact     Please call your clinic at [phone] to:    Ask questions about your health    Make or  cancel appointments    Discuss your medicines    Learn about your test results    Speak to your doctor            Additional Information About Your Visit        [employer] Information     [employer] gives you secure access to your electronic health record. If you see a primary care provider, you can also send messages to your care team and make appointments. If you have questions, please call your primary care clinic.  If you do not have a primary care provider, please call [phone] and they will assist you.      [employer] is an electronic gateway that provides easy, online access to your medical records. With [employer], you can request a clinic appointment, read your test results, renew a prescription or communicate with your care team.     To access your existing account, please contact your [facility] or call [phone] for assistance.        Care EveryWhere ID     This is your Care EveryWhere ID. This could be used by other organizations to access your [facility] medical records  [id number]         Blood Pressure from Last 3 Encounters:   04/04/18 90/60   03/28/18 106/63   03/22/18 119/73    Weight from Last 3 Encounters:   04/04/18 75.4 kg (166 lb 3.2 oz)   03/22/18 74.8 kg (165 lb)   03/16/18 76.4 kg (168 lb 8 oz)              We Performed the Following     [id number] TESTING, PER HR/PSYCHOLOGIST     NEUROPSYCH TESTING BY [facility]        Primary Care Provider Office Phone # Fax #    Yahir Alex Turcios [phone]       [address]  [facility] [address]        Equal Access to Services     JOHN HAUSER : Hadii marsha ku hadasho Soomaali, waaxda luqadaha, qaybta kaalmada jose crow . So [facility] [phone].    ATENCIÓN: Si habla español, tiene a ortiz disposición servicios gratuitos de asistencia lingüística. Llame al [phone].    We comply with applicable federal civil rights laws and [place] laws. We do not discriminate on the  basis of race, color, national origin, age, disability, sex, sexual orientation, or gender identity.            Thank you!     Thank you for choosing [facility]  for your care. Our goal is always to provide you with excellent care. Hearing back from our patients is one way we can continue to improve our services. Please take a few minutes to complete the written survey that you may receive in the mail after your visit with us. Thank you!             Your Updated Medication List - Protect others around you: Learn how to safely use, store and throw away your medicines at www.disposemymeds.org.          This list is accurate as of 4/2/18 11:59 PM.  Always use your most recent med list.                   Brand Name Dispense Instructions for use Diagnosis    albuterol 108 (90 BASE) MCG/ACT Inhaler    PROAIR HFA/PROVENTIL HFA/VENTOLIN HFA    1 Inhaler    Inhale 2 puffs into the lungs every 6 hours as needed for shortness of breath / dyspnea or wheezing    Cough       allopurinol 300 MG tablet    ZYLOPRIM    30 tablet    Take 1 tablet (300 mg) by mouth daily    Chronic gout of wrist, unspecified cause, unspecified laterality, Gout, unspecified cause, unspecified chronicity, unspecified site       aspirin 81 MG tablet      Take 1 tablet (81 mg) by mouth at bedtime        atorvastatin 40 MG tablet    LIPITOR    90 tablet    Take 1 tablet (40 mg) by mouth daily    CKD (chronic kidney disease) stage 3, GFR 30-59 ml/min, Hyperlipidemia LDL goal <100       B-COMPLEX PO      Take 1 tablet by mouth daily        bismuth subsalicylate 262 MG/15ML suspension    PEPTO BISMOL     Take 15 mLs by mouth every 6 hours as needed for indigestion        blood glucose monitoring lancets     102 each    Use to test blood sugar 2-3 times daily or as directed.  Ok to substitute alternative if insurance prefers.    Type 2 diabetes mellitus with stage 5 chronic kidney disease not on chronic dialysis, without long-term current use of  insulin (H)       blood glucose monitoring test strip    no brand specified    100 each    Use to test blood sugar 2-3 times daily or as directed.    Type 2 diabetes mellitus with stage 5 chronic kidney disease not on chronic dialysis, without long-term current use of insulin (H)       calcium acetate (Phos Binder) 667 MG Tabs     180 tablet    Take 1 tablet by mouth 3 times daily (with meals)    Hyperphosphatemia       fluticasone 50 MCG/ACT spray    FLONASE    16 g    Spray 1-2 sprays into both nostrils daily    Nasal congestion       glipiZIDE 5 MG tablet    GLUCOTROL    30 tablet    Take 1 tablet (5 mg) by mouth daily (with breakfast)    Type 2 diabetes mellitus with stage 5 chronic kidney disease not on chronic dialysis, without long-term current use of insulin (H)       loratadine 10 MG tablet    CLARITIN     Take 10 mg by mouth daily as needed Reported on 5/3/2017    Hypertensive cardiopathy, SOB (shortness of breath)       midodrine HCl 10 MG Tabs     90 tablet    Take 1 tablet by mouth three times a week    Hemodialysis-associated hypotension       omeprazole 20 MG CR capsule    priLOSEC     Take 20 mg by mouth daily At night        order for DME     1 Units    Equipment being ordered: [employer] () Treatment Diagnosis: ESRD on PD Pt has to be connected to PD all night and can not be disconnected, hence impending his mobility to go to the bathroom. At risk for infection if he does not have this equipment.    CKD (chronic kidney disease) stage 5, GFR less than 15 ml/min (H)       traMADol 50 MG tablet    ULTRAM    50 tablet    Take 1 tablet (50 mg) by mouth every 6 hours as needed for moderate pain    Abdominal pain, generalized

## 2018-04-02 NOTE — NURSING NOTE
The patient was seen for neuropsychological evaluation at the request of Dr. Klever Ernst for the purpose of diagnostic clarification and treatment planning.  1 hours of face to face testing were provided by this writer.  Please see Dr. Patricia Cates's report for a full interpretation of the findings.

## 2018-04-04 ENCOUNTER — PRE VISIT (OUTPATIENT)
Dept: CARDIOLOGY | Facility: CLINIC | Age: 63
End: 2018-04-04

## 2018-04-04 ENCOUNTER — OFFICE VISIT (OUTPATIENT)
Dept: PHARMACY | Facility: CLINIC | Age: 63
End: 2018-04-04
Payer: COMMERCIAL

## 2018-04-04 VITALS
SYSTOLIC BLOOD PRESSURE: 90 MMHG | BODY MASS INDEX: 24.54 KG/M2 | DIASTOLIC BLOOD PRESSURE: 60 MMHG | WEIGHT: 166.2 LBS | HEART RATE: 111 BPM | OXYGEN SATURATION: 99 %

## 2018-04-04 DIAGNOSIS — I50.20 SYSTOLIC HEART FAILURE (H): ICD-10-CM

## 2018-04-04 DIAGNOSIS — N18.5 TYPE 2 DIABETES MELLITUS WITH STAGE 5 CHRONIC KIDNEY DISEASE NOT ON CHRONIC DIALYSIS, WITHOUT LONG-TERM CURRENT USE OF INSULIN (H): Primary | ICD-10-CM

## 2018-04-04 DIAGNOSIS — E11.22 TYPE 2 DIABETES MELLITUS WITH STAGE 5 CHRONIC KIDNEY DISEASE NOT ON CHRONIC DIALYSIS, WITHOUT LONG-TERM CURRENT USE OF INSULIN (H): Primary | ICD-10-CM

## 2018-04-04 DIAGNOSIS — I50.22 CHRONIC SYSTOLIC HEART FAILURE (H): ICD-10-CM

## 2018-04-04 LAB — HBA1C MFR BLD: 6.3 % (ref 0–6.4)

## 2018-04-04 PROCEDURE — 36415 COLL VENOUS BLD VENIPUNCTURE: CPT | Performed by: INTERNAL MEDICINE

## 2018-04-04 PROCEDURE — 83036 HEMOGLOBIN GLYCOSYLATED A1C: CPT | Performed by: INTERNAL MEDICINE

## 2018-04-04 PROCEDURE — 99607 MTMS BY PHARM ADDL 15 MIN: CPT | Performed by: PHARMACIST

## 2018-04-04 PROCEDURE — 99606 MTMS BY PHARM EST 15 MIN: CPT | Performed by: PHARMACIST

## 2018-04-04 NOTE — PROGRESS NOTES
SUBJECTIVE/OBJECTIVE:                Murray Nicholson is a 63 year old male coming in for a f/up (3-7-18) LUZ visit.  He was discharged from [facility] on 2-2-18 for CHF, DM, MEADE.       Chief Complaint:  Recheck A1c . He did see his cards -dm TT--he was told ok to stop arb losartan due to hypotension bp's.         Personal Healthcare Goals: fix DM.    Allergies/ADRs: Reviewed in Epic  Tobacco: History of tobacco dependence - quit 20+ YEARS AGO    Alcohol: infrequent   Caffeine: 1 cups/day of coffee  Activity: not very --severe HF.  PMH: Per patient on dialysis, severe HF , type-2 DM     Medication Adherence/Access:  Patient uses a pill dispenser.  Patient takes medications 3 time(s) per day.  Per patient, misses medication 1 times per week.   Medication barriers: feelings of burden/being overwhelmed.   The patient fills medications at [facility]: NO, fills medications at [facility].      .Diabetes:  Pt currently taking glipizide 5mg tablet qam . Pt is not experiencing side effects.  SMBG: rarely.   Ranges (based on glucometer readings):                   Patient is not experiencing hypoglycemia  Recent symptoms of high blood sugar? none  Eye exam: up to date  Foot exam: up to date  Microalbumin is not < 30 mg/g. Pt is not taking an ACEi/ARB.  Aspirin: Taking 81mg daily and denies side effects  Diet/Exercise: minimal exercise, diet healthy      Hypertension: Current medications include was on 12.5 mg losartan but BP too low so he stopped it 3 weeks ago after seeing binh md due to hypotension. Patient does self-monitor BP. Home BP monitoring in range of 's systolic over 50-70's diastolic.pulse rates 120's  Patient reports the following medication side effects: no symptoms but BP's too low.     He see's jenn --ranjana purcell .          Current labs include:  Today's Vitals: BP 90/60  Pulse 111  Wt 166 lb 3.2 oz (75.4 kg)  SpO2 99%  BMI 24.54 kg/m2  BP Readings from Last 3 Encounters:   04/04/18 90/60    03/28/18 106/63   03/22/18 119/73     Lab Results   Component Value Date    A1C 6.3 04/04/2018   .  Lab Results   Component Value Date    CHOL 101 04/11/2017     Lab Results   Component Value Date    TRIG 145 04/11/2017     Lab Results   Component Value Date    HDL 34 04/11/2017     Lab Results   Component Value Date    LDL 38 04/11/2017       Liver Function Studies -   Recent Labs   Lab Test  03/07/18   0833  02/19/18   1558   PROTTOTAL   --   7.2   ALBUMIN   --   2.7*   BILITOTAL  0.7  0.6   ALKPHOS  129  102   AST  18  18   ALT  21  15       Lab Results   Component Value Date    UCRR 136 05/17/2017    MICROL 1160 05/19/2015    UMALCR 1266.38 (H) 05/19/2015       Last Basic Metabolic Panel:  Lab Results   Component Value Date     03/22/2018      Lab Results   Component Value Date    POTASSIUM 3.3 03/22/2018     Lab Results   Component Value Date    CHLORIDE 103 03/22/2018     Lab Results   Component Value Date    BUN 24 03/22/2018     Lab Results   Component Value Date    CR 3.94 03/22/2018     GFR Estimate   Date Value Ref Range Status   03/22/2018 16 (L) >60 mL/min/1.7m2 Final     Comment:     Non  GFR Calc   03/15/2018 15 (L) >60 mL/min/1.7m2 Final     Comment:     Non  GFR Calc   03/07/2018 9 (L) >60 mL/min/1.7m2 Final     Comment:     Non  GFR Calc     GFR Estimate If Black   Date Value Ref Range Status   03/22/2018 19 (L) >60 mL/min/1.7m2 Final     Comment:      GFR Calc   03/15/2018 18 (L) >60 mL/min/1.7m2 Final     Comment:      GFR Calc   03/07/2018 10 (L) >60 mL/min/1.7m2 Final     Comment:      GFR Calc     TSH   Date Value Ref Range Status   02/21/2018 3.59 0.40 - 4.00 mU/L Final   ]    Most Recent Immunizations   Administered Date(s) Administered     HEPA 02/09/2010     HepB 04/05/2016     Influenza (H1N1) 02/09/2010     Influenza (IIV3) PF 11/26/2012     Influenza Vaccine IM 3yrs+ 4 Valent IIV4  11/07/2017     Mantoux Tuberculin Skin Test 01/23/2018     Pneumo Conj 13-V (2010&after) 09/23/2015     Pneumococcal 23 valent 02/23/2011     TD (ADULT, 7+) 08/12/2004     TDAP Vaccine (Adacel) 02/28/2013     Zoster vaccine, live 04/09/2015         ASSESSMENT:                 Current medications were reviewed today.      Medication Adherence: excellent, no issues identified    Diabetes: Stable. Patient is meeting A1c goal of < 7-8%. Self monitoring of blood glucose is not at goal of fasting  mg/dL and post prandial < 150 mg/dL. Pt would benefit from minimum SMBG: Check blood sugars fasting, and occasionally 2 hours after starting a meal.  SFU (Glipizide) :  stay on the same dose.    fyi--we discussed due to his heart issues -- he needs to avoid any hypo  Numbers that would not be healthy for his heart .       Hypertension: Stable. Patient is meeting BP goal of < 140/90mmHg.   Pt would benefit from the following changes - continued BP monitoring , ok to stay off arb med per cards md due to low bp.       PLAN:                  1. A1c today = 6.3% -- excellent control .  Remember -- you donot need any blood sugars < 80.   Keeping all blood sugars between 100-200 is a very good range.     Stay on your Glipizide 5mg. Daily , keep a carb snack or glucose tabs with you in case your blood sugar drops too low.     2. BP 90/60mmhg --ok to stay off all Losartan for now.        Next MTM visit:  See me in future if needed ?   See dr. Turcios and your cardiology team as needed.       I spent 30 minutes with this patient today. All changes were made via collaborative practice agreement with Yahir Turcios. A copy of the visit note was provided to the patient's primary care provider.      The patient was given a summary of these recommendations as an after visit summary.    Tono Clay [facility].  Medication Therapy Management Provider  [phone]

## 2018-04-04 NOTE — TELEPHONE ENCOUNTER
ASKED BY REFERRING PHYSICIAN: Klever Ernst MD    CHIEF COMPLAINT: Pre Visit Planning - Done (New consult for bicuspid aortic valve with severe regurgitation and mitral valve with severe regurgitation.)      HPI: Murray is a 63 year old male who presents with significant medical history including HFpPH, ascending aortic aneurysm, bicuspid aortic valve, CKD on dialysis, CHF, HTN, T2 DM, SHEELA, dyslipidemia, LV systolic dysfunction with EF of 10-15%.  Patient has bicuspid aortic valve, severe AI, severe MR, and severe LV systolic dysfunction. He also has ESRD and he is on HD.  Patient is here for evaluation for MVR and AVR with LVAD backup.  LVAD would be bridge to transplant.       PROCEDURES/IMAGING:    MARIANA: 03/15/18  Interpretation Summary  Evaluation performed under optimal hemodynamic condition, post HD, and with BP 98/68.  Severely (EF <30%) reduced left ventricular function is present. The Ejection Fraction is estimated at 15-20%.  Global right ventricular function is mildly to moderately reduced.  Moderate under hypovolemic condition and likely severe under usual loading conditon mitral regurgitation. A single, focused regurgitant jet appears to originate mostly from the A3 and P3 scallops. Mixed etiology mitral  regurgitation [LV dilation with restriction of the posterior leaflet (Alex IIIB)]. A secondary chordal structure appears disrupted in the A2 and P2 scallop.  Functionally bicuspid aortic valve with fusion of the left and right cusps (Alex type III.) Severe holodiastolic aortic insufficiency is present.The size of the annulus measures 26.8 mm by 2D and 31.5 mm by 3D. The max and min diameter is 33 and 29.7 mm, respectively. The circumference is 99 mm and the area is 7.7 cm2. There is little to no calcification in the area of the annulus and in the LVOT. Consider  TAVR evaluation and mitral valve edge to edge repair.    Left Ventricle  Severely (EF <30%) reduced left ventricular function  is present. The Ejection Fraction is estimated at 15-20%.     Right Ventricle  Global right ventricular function is mildly to moderately reduced.     Atria  The left atrial appendage is normal. It is free of spontaneous echo contrast and thrombus. Moderate to severe biatrial enlargement is present. The atrial septum is intact as assessed by color Doppler and agitated saline bubble tudy .     Mitral Valve  Torn chordae seen in the A3 P3 region. Severe mitral regurgitation with systolic flow reversal noted in left lower pulmonary vein. Regurgitant jet originating from A3 and P3 scallops. Mechanism seems to be LV dilation with restriction of the posterior leaflet (Alex IIIB).     Aortic Valve  Functionally bicuspid aortic valve with fusion of the left and right cusps. Severe aortic insufficiency is present.     Tricuspid Valve  Trace to mild tricuspid insufficiency is present.     Pulmonic Valve  The pulmonic valve is normal.     Vessels  Grade II atherosclerosis of the aorta.     Pericardium  No pericardial effusion is present.    CATH LAB: 02/02/18         RIGHT HEART CATH: 04/03/18            ASSESSMENT/PLAN:   Murray is a 63 year old male who presents with           Risks and benefits of surgery were discussed with patient (and all present) including risk of death, stroke, bleeding, cardiac ischemia, wound infection, renal failure, arrhythmias and possible pacemaker implantation. Patient verbalized understanding and accepts these risks.     Approximately 60 minutes spent with this case including review of the clinical history and data; discussion with the patient and his family and coordination of the care    Thank you for including me in the care of this kind patient. Do not hesitate to contact me with any questions.    Dr. Rony Caputo     Cardiothoracic Surgeon  [facility]  Division of Cardiothoracic Surgery        CC  Patient Care Team:  Yahir Turcios MD as PCP - General (Family  Practice)  Arcelia Blum MD as MD (Cardiology)  Bobby Mcconnell MD as MD (Surgery)  Yahir Turcios MD as MD (Family Practice)  Edith Pacheco, RN as Nurse Coordinator (Cardiology)  Janett Martini NP as Nurse Practitioner (Cardiology)  Kristi Ayon NP as Nurse Practitioner (Cardiology)  Matilda Morales, RN as Nurse Coordinator

## 2018-04-04 NOTE — MR AVS SNAPSHOT
After Visit Summary   4/4/2018    Murray Nicholson    MRN: [id number]           Patient Information     Date Of Birth          1955        Visit Information        Provider Department      4/4/2018 11:30 AM Tono Clay RPH [facility]        Care Instructions    Recommendations from today's MTM visit:                                                        1. A1c today = 6.3% -- excellent control .  Remember -- you donot need any blood sugars < 80.   Keeping all blood sugars between 100-200 is a very good range.     Stay on your Glipizide 5mg. Daily , keep a carb snack or glucose tabs with you in case your blood sugar drops too low.     2. BP 90/60mmhg --ok to stay off all Losartan for now.        Next MTM visit:  See me in future if needed ?   See dr. Turcios and your cardiology team as needed.     To schedule another MTM appointment, please call the clinic directly or you may call the MTM scheduling line at [phone] or toll-free at [phone].     My Clinical Pharmacist's contact information:                                                      It was a pleasure seeing you today!  Please feel free to contact me with any questions or concerns you have.      Tono Clay [facility].  Medication Therapy Management Provider  [phone]      You may receive a survey about the MTM services you received.  I would appreciate your feedback to help me serve you better in the future. Please fill it out and return it when you can. Your comments will be anonymous.                    Follow-ups after your visit        Your next 10 appointments already scheduled     Apr 05, 2018  2:30 PM CDT   (Arrive by 2:15 PM)   New Patient Visit with Rony Caputo MD   [facility] ([facility])    [address] [place] [address]  [address] [place] [id number] [phone]            Apr 09, 2018 10:30 AM CDT   Cardiac Evaluation with  Cardiac Rehab 1   [facility],  [place], Cardiac Rehabilitation ([facility])    [address] 1st [address]  [facility] [address] [phone]            Apr 16, 2018  8:15 AM CDT   US ABDOMEN COMPLETE with UCUS2   [facility] US ([facility] [place])    [address] [id number] [phone]           Please bring a list of your medicines (including vitamins, minerals and over-the-counter drugs). Also, tell your doctor about any allergies you may have. Wear comfortable clothes and leave your valuables at home.  Adults: No eating or drinking for 8 hours before the exam. You may take medicine with a small sip of water.  Children: - Children 6+ years: No food or drink for 6 hours before exam. - Children 1-5 years: No food or drink for 4 hours before exam. - Infants, breast-fed: may have breast milk up to 2 hours before exam. - Infants, formula: may have bottle until 4 hours before exam.  Please call the Imaging Department at your exam site with any questions.            Apr 16, 2018  9:00 AM CDT   US CAROTID BILATERAL with UCUSV2    [facility] ([facility])    [address] [id number] [phone]           Please bring a list of your medicines (including vitamins, minerals and over-the-counter drugs). Also, tell your doctor about any allergies you may have. Wear comfortable clothes and leave your valuables at home.  You do not need to do anything special to prepare for your exam.  Please call the Imaging Department at your exam site with any questions.            Apr 16, 2018 10:00 AM CDT   US LOWER EXTREMITY ARTERIAL DUPLEX BILATERAL with UCUSV2    [facility] ([facility])    [address] [id number] [phone]           Please bring a list of your medicines  (including vitamins, minerals and over-the-counter drugs). Also, tell your doctor about any allergies you may have. Wear comfortable clothes and leave your valuables at home.  You do not need to do anything special to prepare for your exam.  Please call the Imaging Department at your exam site with any questions.            Apr 16, 2018 11:00 AM CDT   US ANDRE DOPPLER NO EXERCISE 1-2 LVLS BILAT with USV2   [facility] US ([facility])    [address] [place] [address] [phone]           Please bring a list of your medicines (including vitamins, minerals and over-the-counter drugs). Also, tell your doctor about any allergies you may have. Wear comfortable clothes and leave your valuables at home.  No caffeine or tobacco for 1 hour prior to exam.  Please call the Imaging Department at your exam site with any questions.            Apr 16, 2018  1:15 PM CDT   (Arrive by 1:00 PM)   XR CHEST 2 VIEWS with XR1   [facility] Xray ([facility])    [address] [place] [id number] [phone]           Please bring a list of your current medicines to your exam. (Include vitamins, minerals and over-thecounter medicines.) Leave your valuables at home.  Tell your doctor if there is a chance you may be pregnant.  You do not need to do anything special for this exam.            Apr 16, 2018  1:30 PM CDT   DX HIP/PELVIS/SPINE with DX1   [facility] Dexa ([facility])    [address] [place] [id number] [phone]           Please do not take any of the following 24 hours prior to the day of your exam: vitamins, calcium tablets, antacids.  If possible, please wear clothes without metal (snaps, zippers). A sweatsuit works well.            Apr 16, 2018  2:00 PM CDT   CT HEAD W/O CONTRAST with CT2   [facility] CT (  [facility] [place])    [address] [facility]  1st Floor  [facility] [phone] [phone]           Please bring any scans or X-rays taken at other hospitals, if similar tests were done. Also bring a list of your medicines, including vitamins, minerals and over-the-counter drugs. It is safest to leave personal items at home.  Be sure to tell your doctor:   If you have any allergies.   If there s any chance you are pregnant.   If you are breastfeeding.  You do not need to do anything special to prepare for this exam.  Please wear loose clothing, such as a sweat suit or jogging clothes. Avoid snaps, zippers and other metal. We may ask you to undress and put on a hospital gown.            Apr 16, 2018  2:30 PM CDT   (Arrive by 2:15 PM)   New Patient Visit with Sonny Johnson MD   [facility] ([facility])    [address] [place] [address]  [address] [place] [id number] [phone]              Who to contact     If you have questions or need follow up information about today's clinic visit or your schedule please contact [facility] directly at [phone].  Normal or non-critical lab and imaging results will be communicated to you by MyChart, letter or phone within 4 business days after the clinic has received the results. If you do not hear from us within 7 days, please contact the clinic through [employer] or phone. If you have a critical or abnormal lab result, we will notify you by phone as soon as possible.  Submit refill requests through [employer] or call your pharmacy and they will forward the refill request to us. Please allow 3 business days for your refill to be completed.          Additional Information About Your Visit        [employer] Information     [facility] gives you secure access to your electronic health record. If you see a primary care provider, you can also send messages to your care team and make appointments. If you have  questions, please call your primary care clinic.  If you do not have a primary care provider, please call [phone] and they will assist you.        Care EveryWhere ID     This is your Care EveryWhere ID. This could be used by other organizations to access your [place] medical records  [id number]        Your Vitals Were     Pulse Pulse Oximetry BMI (Body Mass Index)             111 99% 24.54 kg/m2          Blood Pressure from Last 3 Encounters:   04/04/18 90/60   03/28/18 106/63   03/22/18 119/73    Weight from Last 3 Encounters:   04/04/18 166 lb 3.2 oz (75.4 kg)   03/22/18 165 lb (74.8 kg)   03/16/18 168 lb 8 oz (76.4 kg)              Today, you had the following     No orders found for display       Primary Care Provider Office Phone # Fax #    Yahir Turcios [phone]       [address]  [facility] [address]        Equal Access to Services     JOHN HAUSER : Hadii aad ku hadasho Soomaali, waaxda luqadaha, qaybta kaalmada adeegyada, waxay idiin hayaan adeeg kharafaby la'hankn . So [facility] [phone].    ATENCIÓN: Si habla español, tiene a ortiz disposición servicios gratuitos de asistencia lingüística. Llame al [phone].    We comply with applicable federal civil rights laws and [place] laws. We do not discriminate on the basis of race, color, national origin, age, disability, sex, sexual orientation, or gender identity.            Thank you!     Thank you for choosing [facility]  for your care. Our goal is always to provide you with excellent care. Hearing back from our patients is one way we can continue to improve our services. Please take a few minutes to complete the written survey that you may receive in the mail after your visit with us. Thank you!             Your Updated Medication List - Protect others around you: Learn how to safely use, store and throw away your medicines at www.disposemymeds.org.          This list is accurate as of 4/4/18 12:10 PM.   Always use your most recent med list.                   Brand Name Dispense Instructions for use Diagnosis    albuterol 108 (90 BASE) MCG/ACT Inhaler    PROAIR HFA/PROVENTIL HFA/VENTOLIN HFA    1 Inhaler    Inhale 2 puffs into the lungs every 6 hours as needed for shortness of breath / dyspnea or wheezing    Cough       allopurinol 300 MG tablet    ZYLOPRIM    30 tablet    Take 1 tablet (300 mg) by mouth daily    Chronic gout of wrist, unspecified cause, unspecified laterality, Gout, unspecified cause, unspecified chronicity, unspecified site       aspirin 81 MG tablet      Take 1 tablet (81 mg) by mouth at bedtime        atorvastatin 40 MG tablet    LIPITOR    90 tablet    Take 1 tablet (40 mg) by mouth daily    CKD (chronic kidney disease) stage 3, GFR 30-59 ml/min, Hyperlipidemia LDL goal <100       B-COMPLEX PO      Take 1 tablet by mouth daily        bismuth subsalicylate 262 MG/15ML suspension    PEPTO BISMOL     Take 15 mLs by mouth every 6 hours as needed for indigestion        blood glucose monitoring lancets     102 each    Use to test blood sugar 2-3 times daily or as directed.  Ok to substitute alternative if insurance prefers.    Type 2 diabetes mellitus with stage 5 chronic kidney disease not on chronic dialysis, without long-term current use of insulin (H)       blood glucose monitoring test strip    no brand specified    100 each    Use to test blood sugar 2-3 times daily or as directed.    Type 2 diabetes mellitus with stage 5 chronic kidney disease not on chronic dialysis, without long-term current use of insulin (H)       calcium acetate (Phos Binder) 667 MG Tabs     180 tablet    Take 1 tablet by mouth 3 times daily (with meals)    Hyperphosphatemia       fluticasone 50 MCG/ACT spray    FLONASE    16 g    Spray 1-2 sprays into both nostrils daily    Nasal congestion       glipiZIDE 5 MG tablet    GLUCOTROL    30 tablet    Take 1 tablet (5 mg) by mouth daily (with breakfast)    Type 2 diabetes  mellitus with stage 5 chronic kidney disease not on chronic dialysis, without long-term current use of insulin (H)       loratadine 10 MG tablet    CLARITIN     Take 10 mg by mouth daily as needed Reported on 5/3/2017    Hypertensive cardiopathy, SOB (shortness of breath)       midodrine HCl 10 MG Tabs     90 tablet    Take 1 tablet by mouth three times a week    Hemodialysis-associated hypotension       omeprazole 20 MG CR capsule    priLOSEC     Take 20 mg by mouth daily At night        order for DME     1 Units    Equipment being ordered: Commode () Treatment Diagnosis: ESRD on PD Pt has to be connected to PD all night and can not be disconnected, hence impending his mobility to go to the bathroom. At risk for infection if he does not have this equipment.    CKD (chronic kidney disease) stage 5, GFR less than 15 ml/min (H)       traMADol 50 MG tablet    ULTRAM    50 tablet    Take 1 tablet (50 mg) by mouth every 6 hours as needed for moderate pain    Abdominal pain, generalized

## 2018-04-04 NOTE — PATIENT INSTRUCTIONS
Recommendations from today's MTM visit:                                                        1. A1c today = 6.3% -- excellent control .  Remember -- you donot need any blood sugars < 80.   Keeping all blood sugars between 100-200 is a very good range.     Stay on your Glipizide 5mg. Daily , keep a carb snack or glucose tabs with you in case your blood sugar drops too low.     2. BP 90/60mmhg --ok to stay off all Losartan for now.        Next MTM visit:  See me in future if needed ?   See dr. Turcios and your cardiology team as needed.     To schedule another MTM appointment, please call the clinic directly or you may call the MTM scheduling line at [phone] or toll-free at [phone].     My Clinical Pharmacist's contact information:                                                      It was a pleasure seeing you today!  Please feel free to contact me with any questions or concerns you have.      Tono [facility].  Medication Therapy Management Provider  [phone]      You may receive a survey about the MTM services you received.  I would appreciate your feedback to help me serve you better in the future. Please fill it out and return it when you can. Your comments will be anonymous.

## 2018-04-05 ENCOUNTER — OFFICE VISIT (OUTPATIENT)
Dept: CARDIOLOGY | Facility: CLINIC | Age: 63
End: 2018-04-05
Attending: THORACIC SURGERY (CARDIOTHORACIC VASCULAR SURGERY)
Payer: COMMERCIAL

## 2018-04-05 VITALS
BODY MASS INDEX: 24.42 KG/M2 | WEIGHT: 164.9 LBS | SYSTOLIC BLOOD PRESSURE: 91 MMHG | HEART RATE: 110 BPM | DIASTOLIC BLOOD PRESSURE: 61 MMHG | OXYGEN SATURATION: 100 % | HEIGHT: 69 IN

## 2018-04-05 DIAGNOSIS — I50.30 DIASTOLIC CONGESTIVE HEART FAILURE, UNSPECIFIED CONGESTIVE HEART FAILURE CHRONICITY: Primary | ICD-10-CM

## 2018-04-05 PROCEDURE — G0463 HOSPITAL OUTPT CLINIC VISIT: HCPCS | Mod: ZF

## 2018-04-05 ASSESSMENT — PAIN SCALES - GENERAL: PAINLEVEL: NO PAIN (0)
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
## 2018-04-05 NOTE — NURSING NOTE
Chief Complaint   Patient presents with     New Patient     New consult for bicuspid aortic valve with severe regurgitation and mitral valve with severe regurgitation.     Vitals were taken and medications were reconciled.     Polly Del Real MA    2:36 PM
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
## 2018-04-05 NOTE — PROGRESS NOTES
Neuropsychology Laboratory  [address], [address]  [place]  [address]  [phone]    NEUROPSYCHOLOGICAL EVALUATION      RELEVANT HISTORY AND REASON FOR REFERRAL:    Murray Nicholson is a 63-year-old type II diabetic with stage V renal disease, chronic heart failure, and coronary artery disease, being worked up for heart and kidney transplants.  Other health issues include hypertension, hyperlipidemia, bicuspid aortic valve aortopathy, for which he may be a candidate for valve surgery.  He was previously on peritoneal dialysis now switching to hemodialysis.  This neuropsychological evaluation is requested by his cardiologist Dr. Klever Ernst, as a routine part of the heart transplant workup, to assess cognition, mental health, and lifestyle issues, as it relates to his suitability for this advanced treatment.     He was born in [place] and grew up there, reared by his mother and other maternal relatives.  His mother is , his father .  They  when he was very young and the patient never actually met his father.  His mother  three times, and he has an older half-brother with the same mother and a younger half-brother with the same father.  A good student, he finished high school after attending summer school and went on to finish two years of college with a theater arts focus.  Starting in his yuly and senior high school years, he was involved in the theater and was a political activist, participating in several marches and anti-war protests.  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  His 20 year marriage ended in divorce.  He has two young adult sons from that union.  He s lived  in [place] since 1987, currently in [place].    BEHAVIORAL OBSERVATIONS AND CLINICAL INTERVIEW FINDINGS:    He arrived early and alone presenting as an older [place] man with greying wavy dark hair, of average stature and somewhat heavy build, neat in a grey dress shirt, dark slacks, and slip-on suede shoes.  Mood was euthymic, and he was fairly outgoing throughout, expressing himself in an articulate, contemplative manner.      Type II diabetes was diagnosed 15 years ago and certainly contributed to the renal failure.  Otherwise he has not had retinopathies, neuropathies, or poorly healing wounds.  Other health issues are hypertension, hyperlipidemia, and gout.  Surgeries include a hernia repair seven years ago and dialysis port placement.  He was on peritoneal dialysis since August, 2017, discontinued in January due to a port infection, and he s been on hemodialysis since mid-January.  It is less to his liking, sapping energy.  He is being worked up for heart and kidney transplants and LVAD is also under consideration.  He s prepared to consider any advanced treatment that will keep him alive.  He doesn t anticipate finding a live kidney donor (he s mentioned it to his two sons who show no indications of pursuing donation).  I defer to the transplant  in exploring the support system in detail, but he mentioned a good friend who helps with appointments, and seems to expect more assistance from that individual than his immediate family.      The neurological history is negative for head traumas, cardiac arrests, stroke symptoms, or any other diseases of the brain.  He has not been plagued by emotional distress of any kind, and considers himself optimistic by nature.  He saw therapists briefly, including a , to address marital strife (he says his former wife was an alcoholic), and again for a few weeks while going through a divorce.  He once attended an Al-Anon meeting but did not  find it particularly beneficial.  He has not been on psychotropic medications, nor has he sought any other mental health treatment.     Regarding habits, he was a one-pack-per-day smoker for 15 years, quitting the habit over 20 years ago.  Never a heavy drinker (previously averaging one alcohol beverage a week at most), he quit drinking entirely after developing renal failure.  During his college years he intermittently smoked marijuana, and during his 20s used cocaine occasionally.  Other drug use was denied, including intravenous use of any substance.  He used marijuana  a little bit here and there  throughout adulthood, but has not smoked it in the last two years after developing breathing problems associated with heart failure.     Very little is known about the paternal side of the family, though he learned through the [employer] that his father possibly had cerebrovascular disease.  His mother developed dementia late in life, requiring caretakers for the last 10 years of her life, dying at age 90.      During testing he was cooperative and seemed well motivated to do his best.  He had no trouble understanding or following directives.  Results are considered technically valid.      NEUROPSYCHOLOGICAL FINDINGS:    Select intellectual abilities were assessed with subtests from the Wechsler Adult Intelligence Scale-IV.  Speeded graphomotor learning (Coding) was low average.  He was accurate but a little slow on this timed transcription task.  In contrast, visuospatial processing and constructional abilities (Block Design) and word knowledge and expressive communicability (Vocabulary) were above average.      Immediate memory for two story passages from the Wechsler Memory Scale-Revised was very superior, with 39 of 50 story elements recalled immediately after presentation.  Retention of the stories 30 minutes later was also very superior.  Word list learning (Flavio Auditory Verbal Learning Test) was likewise  superior, with all 15 words learned by the last trial.  He demonstrated a normal learning curve with no intrusive errors.  Retention of the list after a brief distractor exercise and 30 minute delay was also superior, with minimal loss.  All 15 words were recognized when presented in paragraph form with no intrusive erring.  Immediate memory for figural material (four designs from the WMS) was high average.  Free recall 30 minutes later was high average and with cueing all of the originally learned material was recalled.  All four figures were recognized when presented in multiple choice format.  Copy drawings of three Turpin Gestalt figures were within normal limits for his age and all three were recalled immediately after presentation.      Performance on a simple numerical sequencing exercise (Trail Making Test Part A), requiring sustained attention, was solidly average for speed and error free.  Performance on a more complex sequencing exercise (Trail Making Test Part B), requiring divided attention (alternating between number and letter sequences), was also average for speed and error free.  Verbal associative fluency on the Controlled Oral Word Association Test (generating words beginning with target letters) was high average with 48 countable responses produced across the three 60 second trials.  A variety of brief exercises requiring sustained attention and cognitive flexibility (Test of Sustained Attention and Tracking) were completed slowly with just one error.  Single word reading, as measured by the Wide Range Achievement Test-IV was superior, above the high school level.  Finger tapping speeds were within normal limits bilaterally, the left (nondominant) hand slightly faster than the right overall.     The MMPI-2RF, a self-report personality measure, was also completed.  All items were answered.  Validity indicators suggest candid, consistent responding, so results are considered technically valid and  an accurate reflection of current emotional functioning and true personality dynamics.  The clinical profiles contraindicate emotional or behavioral disturbance or severe forms of psychopathology.  He is experiencing low energy/malaise, consistent with the known health problems, and is thinking more about death, though there are no indications of suicidal ideation.  He feels helpless in impacting his problems.  Interpersonally, this is a rather cynical individual with a somewhat negative view of human nature, not apt to derive much pleasure from social interactions, though he is not shy.      CONCLUSIONS AND RECOMMENDATIONS:    This 63-year-old man with type II kidney disease and renal failure has been on peritoneal and then hemodialysis since last summer.  Cardiac functioning has declined precipitously, and he is now being considered for heart and kidney transplants.  The neurological and psychiatric histories are unremarkable.  He s been a casual marijuana smoker over the years, but denies using that drug in the last couple of years, since developing breathing problems related to heart disease.  He is a former tobacco user with a 15-pack-a-year history, giving up that habit 20 years ago.      Testing reveals very subtle deficits, primarily mildly slowed processing speeds, seen on tasks requiring sustained and divided attention.  Otherwise vocabulary and nonverbal reasoning abilities are above average and learning and long-term retention of verbal information (story passages and word lists) ranges from above average to very superior.  Short and long-term retention of figural material is well within the normal range as well.  Verbal fluency and finger tapping speeds are within normal limits. Reading skills are superior, above the twelfth grade level.  He produced a technically valid MMPI reflecting cynicism and malaise, the latter related to advanced heart and renal failure.      The adequacy of his support system  is unclear.  He is  with two young adult sons but their level of commitment to his needs are unclear.  I leave it to our transplant social workers in exploring this in greater detail.  Otherwise from a psychosocial standpoint he appears to be a reasonably good transplant candidate.  He s certainly able to understand complex medical information, digest written materials well, and make clear, well reasoned decision about health care.      Patricia Cates, [place].D.   Licensed Psychologist, L.P. 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  This evaluation included approximately three hours of testing administered by a psychometrist with interpretation by a neuropsychologist (CPT 17304) and an additional three hours of professional time spent on the interview, data integration, record review, and report preparation (CPT 51804).    DDR: (JOE)
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
## 2018-04-05 NOTE — PROGRESS NOTES
RATINGS FOR CRITICAL FACTORS: LOW MARGINAL ADEQUATE   Data validity/Quality confidence level                                           X                                                                           Comprehension/Understanding         X                                                                                        Information Recall                                         X                                                             Motivation Level                     X                                                                          General Compliance                                                                          X                                                                                                    LEVELS OF FUNCTION AND ESTIMATED IMPAIRMENT     CLEARLY MILDLY    WAIS-IV IMPAIRED IMPAIRED INTACT   Vocabulary          13    Communicability          X                                                                                           Block Design          13  Visuospatial Reasoning          X                                                                                           Coding            8   Graphomotor Learning          X                                                                                            Prorated IQ        108   Info Process/Understanding      X                                                                                           FABIAN AUDITORY VERBAL LEARNING TEST     I II III IV V VI VII      5    11   14   13   15    7    14  Memory for Words                   X                                                                              30 minute recall   13              X                                                                                           Recognition   15                       X                                                                                 WMS Passages                                                                                                  Immediate Recall   18/21  Memory for Prose                   X                                                                                              30 Minute Recall   18/17                      X                                                                                           WMS Figures    Immediate Recall  12  Memory for Figures      X                                                                                           30 Minute Recall  10        X                                                                                           LEÓN FIGURAL RECALL    (3 figure)  2  Memory for Designs      X                                                                                         TRAIL MAKING TEST    A. Time       36   Errors       0   Planning/Vigilance                   X                                                                           B. Time   84   Errors       0                       X                                                                             CONTROLLED ORAL WORD ASSOC. TEST    Raw score  48  Associative Fluency/Speed      X                                                                                           TEST OF SUSTAINED ATTENTION & TRACKING    Time:  152      Errors:       1  Attention/Concentration    X                                                                                                       FINGER TAPPING               RH 41  LH       43  Manual Speed                   X                                                                                 WRAT-4  Standard  %tile     Grade                    Score        rank     Equiv.     Reading    126   96      >12.9             Reading Abilities                                                                        X
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
## 2018-04-06 PROBLEM — Z51.81 ENCOUNTER FOR THERAPEUTIC DRUG MONITORING: Status: ACTIVE | Noted: 2018-04-06

## 2018-04-06 NOTE — NURSING NOTE
Patient seen today for consultation for mitral and aortic valve replacement with LVAD backup.     Surgery procedure explained to patient  and all questions and concerns were answered and addressed.     Valve choices will be discussed with patient after additional historical information is obtained from [facility] regarding echocardiograms performed in 2008, to assess etiology of valvular disease.      Patient signed DAVID and information will be requested from [facility].    Patient to be called after films are reviewed by Dr Caputo    Patient verbalized understanding of all instructions and will call with any questions or concerns.
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
## 2018-04-09 LAB
DLCOUNC-%PRED-PRE: 65 %
DLCOUNC-PRE: 17.44 ML/MIN/MMHG
DLCOUNC-PRED: 26.44 ML/MIN/MMHG
ERV-%PRED-PRE: 95 %
ERV-PRE: 0.96 L
ERV-PRED: 1.01 L
EXPTIME-PRE: 7.79 SEC
FEF2575-%PRED-PRE: 75 %
FEF2575-PRE: 1.86 L/SEC
FEF2575-PRED: 2.47 L/SEC
FEFMAX-%PRED-PRE: 75 %
FEFMAX-PRE: 6.26 L/SEC
FEFMAX-PRED: 8.31 L/SEC
FEV1-%PRED-PRE: 88 %
FEV1-PRE: 2.6 L
FEV1FEV6-PRE: 75 %
FEV1FEV6-PRED: 79 %
FEV1FVC-PRE: 79 %
FEV1FVC-PRED: 75 %
FEV1SVC-PRE: 81 %
FEV1SVC-PRED: 67 %
FIFMAX-PRE: 4.6 L/SEC
FRCPLETH-%PRED-PRE: 80 %
FRCPLETH-PRE: 2.78 L
FRCPLETH-PRED: 3.46 L
FVC-%PRED-PRE: 87 %
FVC-PRE: 3.27 L
FVC-PRED: 3.74 L
IC-%PRED-PRE: 66 %
IC-PRE: 2.24 L
IC-PRED: 3.38 L
RVPLETH-%PRED-PRE: 76 %
RVPLETH-PRE: 1.82 L
RVPLETH-PRED: 2.39 L
TLCPLETH-%PRED-PRE: 77 %
TLCPLETH-PRE: 5.02 L
TLCPLETH-PRED: 6.52 L
VA-%PRED-PRE: 78 %
VA-PRE: 4.93 L
VC-%PRED-PRE: 72 %
VC-PRE: 3.2 L
VC-PRED: 4.39 L

## 2018-04-10 ENCOUNTER — HOSPITAL ENCOUNTER (OUTPATIENT)
Dept: CARDIAC REHAB | Facility: CLINIC | Age: 63
End: 2018-04-10
Payer: COMMERCIAL

## 2018-04-10 VITALS — HEIGHT: 69 IN | WEIGHT: 169 LBS | BODY MASS INDEX: 25.03 KG/M2

## 2018-04-10 PROCEDURE — 93798 PHYS/QHP OP CAR RHAB W/ECG: CPT | Performed by: OCCUPATIONAL THERAPIST

## 2018-04-10 PROCEDURE — 40000575 ZZH STATISTIC OP CARDIAC VISIT #2: Performed by: OCCUPATIONAL THERAPIST

## 2018-04-10 PROCEDURE — 93797 PHYS/QHP OP CAR RHAB WO ECG: CPT | Mod: 59 | Performed by: OCCUPATIONAL THERAPIST

## 2018-04-10 PROCEDURE — 40000116 ZZH STATISTIC OP CR VISIT: Performed by: OCCUPATIONAL THERAPIST

## 2018-04-10 ASSESSMENT — 6 MINUTE WALK TEST (6MWT)
FEMALE CALC: 1629.6
MALE CALC: 1858.34
PREDICTED: 1869.67
TOTAL DISTANCE WALKED (FT): 338
GENDER SELECTION: MALE

## 2018-04-10 NOTE — PROGRESS NOTES
Murray Nicholson  63 year old  CHF  04/10/18 1300   Session   Session Initial Evaluation and Exercise Prescription   Certified through this date 05/09/18   Cardiac Rehab Assessment  Physician cosignature/electronic signature indicates approval of this ITP document. I have established, reviewed and made necessary changes to the individualized treatment plan and exercise prescription for this patient.     Cardiac Rehab Assessment 4/10/2018 PT is here for his intial visit for diagnosis of heart failure EF 10-15 % and with bicuspid leaking aortic valve.  PT appears very tired today.  He is coming straight from his dialysis.  PT completed 6 min walk test with stopping intermittently.  No symptoms other than fatigue noted.  PT lives alone and drives self. PT reports he is doing most ADL activities without assist.  PT has 2 sons available to assist him as needed. PT reports he has had HF for last 10 years. PT has history of DM , gout , HTN.  PT will benefit from skilled intervention to assist with safe exercise progression and education regarding HF as well as instruction in starting a home exercise program and how to advance his exercise and activities, as well as continuous EKG monitoring to watch for changes. PT is planning to attend rehab up to 3 days per week 24-36 visit.  The patient's history and clinical status including hemodynamics and ECG were evaluated. The patient was assessed to be stable and appropriate to begin exercise.   The patient's functional capacity and exercise prescription were determined by the completion of the 6 minute walk test. See results above. The patient was oriented to the program.  Risk factor profile was completed. Goals and objectives were discussed. CV response was WNL.PT tires easily needed to pace himself through the 6 min walk test. Fair -  Good prognosis for reaching goals below. Skilled therapy is necessary in order to monitor CV response to exercise, to provide education on risk  factors and behavior change counseling needed to achieve patient's goals.  Plan to progress to 20-30 minutes of exercise prior to discharge from cardiac rehab.  Initial THR of 20-30 beats above RHR; Effort rating of 4-6. Initiate muscle conditioning as appropriate. Provide risk factor education and behavior change counseling.      General Information   Treatment Diagnosis Systolic Heart Failure with Lowered EF 10%   Date of Treatment Diagnosis 02/14/18   Secondary Treatment Diagnosis (leaky aortic valve )   Significant Past CV History History of Heart Failure   Comorbidities Renal Disease;DM   Other Medical History Pt has been dealing with CHF for over 10 years. PT has chronic kidney failure and is on dialysis 3 days per week.  PT is on dialysis Tuesday, Thursday and Saturdays.  leaky heart valve bicuspid aortic vlave.  PT has some achiness to right shoulder he has a pain prescription that he will occassionally use for pain relief.     Lead up symptoms known heart failure working towards possible heart transplant , LVAD has leaky aortic valve that may need to be replaced.  consultation with Dr Caputo is occurring    [facility]ospital [facility]    Signs and Symptoms Post Hospital Discharge Fatigue   Comments PT reports signficant SOB upon exertion.  PT feels he needs to rest often and take deep breaths.     Outpatient Cardiac Rehab Start Date 04/10/18   Primary Physician Yahir Turciso   Surgeon Dr Caputo   (in consultation for surgery heart transplant; valve ; LVAD )   Surgeon Follow Up Completed   Ejection Fraction 10-15%   Risk Stratification High   Summary of Cath Report   Summary of Cath Report No information available   Living and Work Status    Living Arrangements and Social Status apartment  (second floor)   Support System Live alone   Return to Employment Not employed   Occupation disabled used to work retail.  PT has 2 sons available to assist as needed.     Preventative Medications   CMS recommended medications  "Antiplatelets;Lipid Lowering   Fall Risk Screen   Fall screen completed by Cardiac Rehab   Have you fallen 2 or more times in the past year? No   Have you fallen and had an injury in the past year? No   Pain   Patient Currently in Pain Denies   Physical Assessments   Incisions Not applicable   Edema None   Right Lung Sounds normal   Left Lung Sounds normal   Limitations Other (see comments)  (low EF )   Comments PT weak and tires very easily he just came from dialysis.     Individualized Treatment Plan   Monitored Sessions Scheduled 24   Monitored Sessions Attended 1   Oxygen   Supplemental Oxygen needed No   Nutrition Management - Weight Management   Assessment Initial Assessment   Age 63   Weight 76.7 kg (169 lb)   Height 1.753 m (5' 9.02\")   BMI (Calculated) 25   Initial Rate Your Plate Score. Dietary tool to assess eating patterns. Scores range from 24 to 72. The higher the score the healthier the eating pattern. (was too tired to complete at initial visit will do at #2)   Nutrition Management - Lipids   Lipids Labs Not Available   Nutrition Management - Diabetes   Diabetes Type II   Do you Monitor BS at Home? Yes   Diabetes Medication Prescribed Yes, Oral Medication   Nutrition Management Summary   Dietary Recommendations Low Sodium   Stages of Change for Diet Compliance Maintenance   Interventions Planned Educate on Benefits of Exercise   Nutrition Summary Comments 4/10/2018PT does not eat prepared foods he is trying to eat heart healthy with low sodium   PT has lost up to 60 pounds over the last year    Psychosocial Management   Psychosocial Assessment Initial   Is there history of clinical depression or increased risk of depression? No previous history   Current Level of Stress per Patient Report Moderate    Current Coping Skills Has Positive Support System  (PT likes to read; meditation )   Initial Patient Health Questionnaire -9 Score (PHQ-9) for depression. 5-9 Minimal symptoms, 10-14 Minor depression, " 15-19 Major depression, moderately severe, > 20 Major depression, severe  (PT too tired to complete on initial visit will do at #2)   Initial [facility] Survey score.  Quality of Life:   If total score > 25 review individual areas where patient rated a 4 or 5.  Consider patients current medical condition and what role that plays on the score.   Adjust treatment protocol to improve areas of concern.  Consider the following:  PHQ9 score, DASI, and re-assessment within the next 30 days to assist with developing treatments.  (PT too tired to complete on initial visit will do at #2)   Stages of Change Preparation   Interventions Planned Patient to verbalize understanding of behavioral assessment results;Patient interested in implementing one strategy to reduce current level of stress/anxiety;Patient to attend stress management class(es)   Patient Goal Yes   Goal Description PT to attend the relaxation and healing classes   Goal Target Date 06/01/18   Psychosocial Comments 4/10/2018 PT reports increased stress with health problems.    Other Core Components - Hypertension   History of or Diagnosis of Hypertension Yes   Currently taking Anti-Hypertensives No   Hypertension Comments PTs BP is on the low side particularly following dialysis.  PT is on medications to increase his bp at this time.     Other Core Components - Tobacco   History of Tobacco Use Yes   Quit Date or Planned Quit Date 01/01/00   Stages of Change Maintenance   Activity/Exercise History   Activity/Exercise Assessment Initial   Activity/Exercise Status prior to event? Sedentary   Number of Days Currently participating in Moderate Physical Activity? 0   Number of Days Currently performing  Aerobic Exercise (including rehab)? 0   Number of Minutes per Session Currently of Aerobic Exercise (average)? 0   Current Stage of Change (Physical Activity) Contemplation   Current Stage of Change (Aerobic Exercise) Contemplation   Patient Goals Goal #1   Goal #1  Description PT to walk up to 10 min continuously 3 days per week without having symptoms.     Goal #1 Target Date 06/01/18   Activity/Exercise Comments 4/10/2018PT reports pretty sedentary lifestyle he gets tired very easily is practicing pacing his activities.     Exercise Assessment   6 Minute Walk Predicted - Gender Selection Male   6 Minute Walk Predicted (Male) 1858.34   6 Minute Walk Predicted (Female) 1629.6   Initial 6 Minute Walk Distance (Feet) 338 ft   Resting  bpm   Exercise  bpm   Post Exercise  bpm   Resting BP 86/56   Exercise BP 96/56   Post Exercise BP 86/54   Pre  mg/dL   Effort Rating 6   Current MET Level 1.5   MET Level Goal 3.5-4.0   ECG Rhythm Sinus tachycardia   Ectopy None   Current Symptoms Fatigue   Limitations/Restrictions Other (see comments)  (low EF 10%)   Exercise Prescription   Mode Treadmill;Nustep;Weights   Duration/Time Intermittent bouts   Frequency 3 daysweek   THR (85% of age predicted max HR) 133.45   OMNI Effort Rating (0-10 Scale) 4-6/10   Progression Intermittent bouts;Total exercise time of 20-30 minutes;Aerobic exercise to OMNI rating of 5-7, and heart rate at or below target;Progress peak intensity by 1/4 MET per week   Recommended Home Exercise   Type of Exercise Walking   Frequency (days per week) 2-3 days in addition to rehab   Duration (minutes per session) Intermittent   Effort Rating Recommended 4-6/10   30 Day Exercise Plan PT to start walking up to 5-6 min daily   Current Home Exercise   Type of Exercise None   Follow-up/On-going Support   Provider follow-up needed on the following No follow-up needed   Learning Assessment   Learner Patient   Primary Language English   Preferred Learning Style Listening;Reading;Demonstration   Patient Education   Education recommended Anatomy and Physiology of the Heart;Exercise Principles;Nutrition;Stress Management

## 2018-04-12 ENCOUNTER — TELEPHONE (OUTPATIENT)
Dept: TRANSPLANT | Facility: CLINIC | Age: 63
End: 2018-04-12

## 2018-04-12 NOTE — TELEPHONE ENCOUNTER
Received call from patient regarding a disability form we received via fax on 4/5. Patient would like to know if the form has been completed and returned. Please call back to discuss.

## 2018-04-16 ENCOUNTER — RESULTS ONLY (OUTPATIENT)
Dept: OTHER | Facility: CLINIC | Age: 63
End: 2018-04-16

## 2018-04-16 ENCOUNTER — RADIANT APPOINTMENT (OUTPATIENT)
Dept: ULTRASOUND IMAGING | Facility: CLINIC | Age: 63
End: 2018-04-16
Attending: INTERNAL MEDICINE
Payer: COMMERCIAL

## 2018-04-16 ENCOUNTER — PRE VISIT (OUTPATIENT)
Dept: CARDIOLOGY | Facility: CLINIC | Age: 63
End: 2018-04-16

## 2018-04-16 ENCOUNTER — OFFICE VISIT (OUTPATIENT)
Dept: CARDIOLOGY | Facility: CLINIC | Age: 63
End: 2018-04-16
Attending: NURSE PRACTITIONER
Payer: COMMERCIAL

## 2018-04-16 ENCOUNTER — RADIANT APPOINTMENT (OUTPATIENT)
Dept: BONE DENSITY | Facility: CLINIC | Age: 63
End: 2018-04-16
Attending: INTERNAL MEDICINE
Payer: COMMERCIAL

## 2018-04-16 ENCOUNTER — ALLIED HEALTH/NURSE VISIT (OUTPATIENT)
Dept: TRANSPLANT | Facility: CLINIC | Age: 63
End: 2018-04-16
Payer: COMMERCIAL

## 2018-04-16 ENCOUNTER — RADIANT APPOINTMENT (OUTPATIENT)
Dept: GENERAL RADIOLOGY | Facility: CLINIC | Age: 63
End: 2018-04-16
Attending: INTERNAL MEDICINE
Payer: COMMERCIAL

## 2018-04-16 ENCOUNTER — RADIANT APPOINTMENT (OUTPATIENT)
Dept: CT IMAGING | Facility: CLINIC | Age: 63
End: 2018-04-16
Attending: INTERNAL MEDICINE
Payer: COMMERCIAL

## 2018-04-16 ENCOUNTER — HOSPITAL ENCOUNTER (OUTPATIENT)
Facility: CLINIC | Age: 63
Setting detail: SPECIMEN
Discharge: HOME | End: 2018-04-16
Admitting: INTERNAL MEDICINE
Payer: COMMERCIAL

## 2018-04-16 ENCOUNTER — HOSPITAL ENCOUNTER (INPATIENT)
Facility: CLINIC | Age: 63
LOS: 77 days | Discharge: HOME | DRG: 001 | End: 2018-07-02
Attending: INTERNAL MEDICINE | Admitting: INTERNAL MEDICINE
Payer: COMMERCIAL

## 2018-04-16 ENCOUNTER — ALLIED HEALTH/NURSE VISIT (OUTPATIENT)
Dept: TRANSPLANT | Facility: CLINIC | Age: 63
End: 2018-04-16
Attending: INTERNAL MEDICINE
Payer: COMMERCIAL

## 2018-04-16 VITALS
SYSTOLIC BLOOD PRESSURE: 110 MMHG | BODY MASS INDEX: 25.12 KG/M2 | HEART RATE: 117 BPM | WEIGHT: 169.6 LBS | HEIGHT: 69 IN | OXYGEN SATURATION: 99 % | DIASTOLIC BLOOD PRESSURE: 75 MMHG

## 2018-04-16 DIAGNOSIS — I50.20 SYSTOLIC HEART FAILURE (H): ICD-10-CM

## 2018-04-16 DIAGNOSIS — I25.10 CORONARY ARTERY DISEASE INVOLVING NATIVE CORONARY ARTERY OF NATIVE HEART WITHOUT ANGINA PECTORIS: ICD-10-CM

## 2018-04-16 DIAGNOSIS — E11.22 TYPE 2 DIABETES MELLITUS WITH STAGE 5 CHRONIC KIDNEY DISEASE NOT ON CHRONIC DIALYSIS, WITHOUT LONG-TERM CURRENT USE OF INSULIN (H): ICD-10-CM

## 2018-04-16 DIAGNOSIS — D72.829 LEUKOCYTOSIS, UNSPECIFIED TYPE: ICD-10-CM

## 2018-04-16 DIAGNOSIS — N18.6 END STAGE RENAL DISEASE (H): ICD-10-CM

## 2018-04-16 DIAGNOSIS — T81.40XA POSTOPERATIVE INFECTION, INITIAL ENCOUNTER: ICD-10-CM

## 2018-04-16 DIAGNOSIS — K59.00 CONSTIPATION, UNSPECIFIED CONSTIPATION TYPE: ICD-10-CM

## 2018-04-16 DIAGNOSIS — R06.09 DYSPNEA ON EXERTION: ICD-10-CM

## 2018-04-16 DIAGNOSIS — I50.22 CHRONIC SYSTOLIC HEART FAILURE (H): ICD-10-CM

## 2018-04-16 DIAGNOSIS — N18.5 TYPE 2 DIABETES MELLITUS WITH STAGE 5 CHRONIC KIDNEY DISEASE NOT ON CHRONIC DIALYSIS, WITHOUT LONG-TERM CURRENT USE OF INSULIN (H): ICD-10-CM

## 2018-04-16 DIAGNOSIS — I50.22 CHRONIC SYSTOLIC HEART FAILURE (H): Primary | ICD-10-CM

## 2018-04-16 DIAGNOSIS — Q23.81 BICUSPID AORTIC VALVE: Primary | ICD-10-CM

## 2018-04-16 DIAGNOSIS — G89.18 ACUTE POST-OPERATIVE PAIN: ICD-10-CM

## 2018-04-16 DIAGNOSIS — I34.0 MITRAL VALVE INSUFFICIENCY, UNSPECIFIED ETIOLOGY: ICD-10-CM

## 2018-04-16 DIAGNOSIS — I10 HYPERTENSION GOAL BP (BLOOD PRESSURE) < 130/80: ICD-10-CM

## 2018-04-16 DIAGNOSIS — I50.21 ACUTE SYSTOLIC CONGESTIVE HEART FAILURE (H): ICD-10-CM

## 2018-04-16 DIAGNOSIS — N18.5 CKD (CHRONIC KIDNEY DISEASE) STAGE 5, GFR LESS THAN 15 ML/MIN (H): ICD-10-CM

## 2018-04-16 DIAGNOSIS — E78.5 DYSLIPIDEMIA: ICD-10-CM

## 2018-04-16 DIAGNOSIS — Z94.1 HEART TRANSPLANT RECIPIENT (H): ICD-10-CM

## 2018-04-16 DIAGNOSIS — Z94.1 HEART TRANSPLANTED (H): ICD-10-CM

## 2018-04-16 DIAGNOSIS — I71.21 ASCENDING AORTIC ANEURYSM (H): ICD-10-CM

## 2018-04-16 DIAGNOSIS — I50.20 SYSTOLIC HEART FAILURE (H): Primary | ICD-10-CM

## 2018-04-16 LAB
ABO + RH BLD: NORMAL
ABO + RH BLD: NORMAL
ALBUMIN SERPL-MCNC: 3.5 G/DL (ref 3.4–5)
ALP SERPL-CCNC: 123 U/L (ref 40–150)
ALT SERPL W P-5'-P-CCNC: 22 U/L (ref 0–70)
ANION GAP SERPL CALCULATED.3IONS-SCNC: 15 MMOL/L (ref 3–14)
APTT PPP: 27 SEC (ref 22–37)
AST SERPL W P-5'-P-CCNC: 17 U/L (ref 0–45)
BASOPHILS # BLD AUTO: 0.1 10E9/L (ref 0–0.2)
BASOPHILS NFR BLD AUTO: 1.2 %
BILIRUB SERPL-MCNC: 0.8 MG/DL (ref 0.2–1.3)
BLD GP AB SCN SERPL QL: NORMAL
BLOOD BANK CMNT PATIENT-IMP: NORMAL
BLOOD BANK CMNT PATIENT-IMP: NORMAL
BUN SERPL-MCNC: 63 MG/DL (ref 7–30)
CALCIUM SERPL-MCNC: 9.6 MG/DL (ref 8.5–10.1)
CHLORIDE SERPL-SCNC: 101 MMOL/L (ref 94–109)
CHOLEST SERPL-MCNC: 152 MG/DL
CO2 SERPL-SCNC: 18 MMOL/L (ref 20–32)
CREAT SERPL-MCNC: 8.81 MG/DL (ref 0.66–1.25)
DIFFERENTIAL METHOD BLD: ABNORMAL
EOSINOPHIL # BLD AUTO: 0.2 10E9/L (ref 0–0.7)
EOSINOPHIL NFR BLD AUTO: 3.2 %
ERYTHROCYTE [DISTWIDTH] IN BLOOD BY AUTOMATED COUNT: 15.7 % (ref 10–15)
GFR SERPL CREATININE-BSD FRML MDRD: 6 ML/MIN/1.7M2
GLUCOSE BLDC GLUCOMTR-MCNC: 212 MG/DL (ref 70–99)
GLUCOSE SERPL-MCNC: 253 MG/DL (ref 70–99)
HCT VFR BLD AUTO: 37.4 % (ref 40–53)
HDLC SERPL-MCNC: 46 MG/DL
HGB BLD-MCNC: 12.3 G/DL (ref 13.3–17.7)
IMM GRANULOCYTES # BLD: 0.1 10E9/L (ref 0–0.4)
IMM GRANULOCYTES NFR BLD: 0.9 %
INR PPP: 1.09 (ref 0.86–1.14)
LDLC SERPL CALC-MCNC: 60 MG/DL
LYMPHOCYTES # BLD AUTO: 0.9 10E9/L (ref 0.8–5.3)
LYMPHOCYTES NFR BLD AUTO: 16.5 %
MAGNESIUM SERPL-MCNC: 1.9 MG/DL (ref 1.6–2.3)
MCH RBC QN AUTO: 32.9 PG (ref 26.5–33)
MCHC RBC AUTO-ENTMCNC: 32.9 G/DL (ref 31.5–36.5)
MCV RBC AUTO: 100 FL (ref 78–100)
MONOCYTES # BLD AUTO: 0.3 10E9/L (ref 0–1.3)
MONOCYTES NFR BLD AUTO: 5.3 %
NEUTROPHILS # BLD AUTO: 4.2 10E9/L (ref 1.6–8.3)
NEUTROPHILS NFR BLD AUTO: 72.9 %
NONHDLC SERPL-MCNC: 106 MG/DL
NRBC # BLD AUTO: 0 10*3/UL
NRBC BLD AUTO-RTO: 1 /100
PHOSPHATE SERPL-MCNC: 6.4 MG/DL (ref 2.5–4.5)
PLATELET # BLD AUTO: 275 10E9/L (ref 150–450)
POTASSIUM SERPL-SCNC: 5 MMOL/L (ref 3.4–5.3)
PROT SERPL-MCNC: 7.4 G/DL (ref 6.8–8.8)
PSA SERPL-ACNC: 2.9 UG/L (ref 0–4)
RBC # BLD AUTO: 3.74 10E12/L (ref 4.4–5.9)
SODIUM SERPL-SCNC: 135 MMOL/L (ref 133–144)
SPECIMEN EXP DATE BLD: NORMAL
TRANSFERRIN SERPL-MCNC: 306 MG/DL (ref 210–360)
TRIGL SERPL-MCNC: 233 MG/DL
TSH SERPL DL<=0.005 MIU/L-ACNC: 2.25 MU/L (ref 0.4–4)
WBC # BLD AUTO: 5.7 10E9/L (ref 4–11)

## 2018-04-16 PROCEDURE — 40000866 ZZHCL STATISTIC HIV 1/2 ANTIGEN/ANTIBODY PRETRANSPLANT ONLY: Performed by: INTERNAL MEDICINE

## 2018-04-16 PROCEDURE — G0103 PSA SCREENING: HCPCS | Performed by: INTERNAL MEDICINE

## 2018-04-16 PROCEDURE — 86905 BLOOD TYPING RBC ANTIGENS: CPT | Performed by: INTERNAL MEDICINE

## 2018-04-16 PROCEDURE — 86901 BLOOD TYPING SEROLOGIC RH(D): CPT | Performed by: INTERNAL MEDICINE

## 2018-04-16 PROCEDURE — 84100 ASSAY OF PHOSPHORUS: CPT | Performed by: INTERNAL MEDICINE

## 2018-04-16 PROCEDURE — 85025 COMPLETE CBC W/AUTO DIFF WBC: CPT | Performed by: INTERNAL MEDICINE

## 2018-04-16 PROCEDURE — 93010 ELECTROCARDIOGRAM REPORT: CPT | Performed by: INTERNAL MEDICINE

## 2018-04-16 PROCEDURE — 86480 TB TEST CELL IMMUN MEASURE: CPT | Performed by: INTERNAL MEDICINE

## 2018-04-16 PROCEDURE — 86665 EPSTEIN-BARR CAPSID VCA: CPT | Performed by: INTERNAL MEDICINE

## 2018-04-16 PROCEDURE — A9270 NON-COVERED ITEM OR SERVICE: HCPCS | Mod: GY | Performed by: STUDENT IN AN ORGANIZED HEALTH CARE EDUCATION/TRAINING PROGRAM

## 2018-04-16 PROCEDURE — 21400006 ZZH R&B CCU INTERMEDIATE UMMC

## 2018-04-16 PROCEDURE — 40000141 ZZH STATISTIC PERIPHERAL IV START W/O US GUIDANCE

## 2018-04-16 PROCEDURE — 93005 ELECTROCARDIOGRAM TRACING: CPT

## 2018-04-16 PROCEDURE — 84466 ASSAY OF TRANSFERRIN: CPT | Performed by: INTERNAL MEDICINE

## 2018-04-16 PROCEDURE — 83735 ASSAY OF MAGNESIUM: CPT | Performed by: INTERNAL MEDICINE

## 2018-04-16 PROCEDURE — 86900 BLOOD TYPING SEROLOGIC ABO: CPT | Performed by: INTERNAL MEDICINE

## 2018-04-16 PROCEDURE — 84443 ASSAY THYROID STIM HORMONE: CPT | Performed by: INTERNAL MEDICINE

## 2018-04-16 PROCEDURE — 86787 VARICELLA-ZOSTER ANTIBODY: CPT | Performed by: INTERNAL MEDICINE

## 2018-04-16 PROCEDURE — 86777 TOXOPLASMA ANTIBODY: CPT | Performed by: INTERNAL MEDICINE

## 2018-04-16 PROCEDURE — 99223 1ST HOSP IP/OBS HIGH 75: CPT | Mod: GC | Performed by: INTERNAL MEDICINE

## 2018-04-16 PROCEDURE — 85610 PROTHROMBIN TIME: CPT | Performed by: INTERNAL MEDICINE

## 2018-04-16 PROCEDURE — 80061 LIPID PANEL: CPT | Performed by: INTERNAL MEDICINE

## 2018-04-16 PROCEDURE — 80053 COMPREHEN METABOLIC PANEL: CPT | Performed by: INTERNAL MEDICINE

## 2018-04-16 PROCEDURE — 00000146 ZZHCL STATISTIC GLUCOSE BY METER IP

## 2018-04-16 PROCEDURE — 86706 HEP B SURFACE ANTIBODY: CPT | Performed by: INTERNAL MEDICINE

## 2018-04-16 PROCEDURE — 85730 THROMBOPLASTIN TIME PARTIAL: CPT | Performed by: INTERNAL MEDICINE

## 2018-04-16 PROCEDURE — G0463 HOSPITAL OUTPT CLINIC VISIT: HCPCS | Mod: ZF

## 2018-04-16 PROCEDURE — 86644 CMV ANTIBODY: CPT | Performed by: INTERNAL MEDICINE

## 2018-04-16 PROCEDURE — 86696 HERPES SIMPLEX TYPE 2 TEST: CPT | Performed by: INTERNAL MEDICINE

## 2018-04-16 PROCEDURE — 86850 RBC ANTIBODY SCREEN: CPT | Performed by: INTERNAL MEDICINE

## 2018-04-16 PROCEDURE — 86803 HEPATITIS C AB TEST: CPT | Performed by: INTERNAL MEDICINE

## 2018-04-16 PROCEDURE — 87340 HEPATITIS B SURFACE AG IA: CPT | Performed by: INTERNAL MEDICINE

## 2018-04-16 PROCEDURE — 99215 OFFICE O/P EST HI 40 MIN: CPT | Mod: ZP | Performed by: NURSE PRACTITIONER

## 2018-04-16 PROCEDURE — 25000132 ZZH RX MED GY IP 250 OP 250 PS 637: Performed by: STUDENT IN AN ORGANIZED HEALTH CARE EDUCATION/TRAINING PROGRAM

## 2018-04-16 PROCEDURE — 86704 HEP B CORE ANTIBODY TOTAL: CPT | Performed by: INTERNAL MEDICINE

## 2018-04-16 PROCEDURE — 86695 HERPES SIMPLEX TYPE 1 TEST: CPT | Performed by: INTERNAL MEDICINE

## 2018-04-16 RX ORDER — ALLOPURINOL 100 MG/1
100 TABLET ORAL DAILY
Status: DISCONTINUED | OUTPATIENT
Start: 2018-04-17 | End: 2018-06-15

## 2018-04-16 RX ORDER — NALOXONE HYDROCHLORIDE 0.4 MG/ML
.1-.4 INJECTION, SOLUTION INTRAMUSCULAR; INTRAVENOUS; SUBCUTANEOUS
Status: DISCONTINUED | OUTPATIENT
Start: 2018-04-16 | End: 2018-05-13

## 2018-04-16 RX ORDER — ASPIRIN 81 MG/1
81 TABLET, CHEWABLE ORAL DAILY
Status: DISCONTINUED | OUTPATIENT
Start: 2018-04-16 | End: 2018-05-08

## 2018-04-16 RX ORDER — MIDODRINE HYDROCHLORIDE 10 MG/1
1 TABLET ORAL
Status: DISCONTINUED | OUTPATIENT
Start: 2018-04-17 | End: 2018-04-16

## 2018-04-16 RX ORDER — LORATADINE 10 MG/1
10 TABLET ORAL DAILY PRN
Status: DISCONTINUED | OUTPATIENT
Start: 2018-04-16 | End: 2018-05-11

## 2018-04-16 RX ORDER — FLUTICASONE PROPIONATE 50 MCG
1-2 SPRAY, SUSPENSION (ML) NASAL DAILY
Status: DISCONTINUED | OUTPATIENT
Start: 2018-04-16 | End: 2018-06-15

## 2018-04-16 RX ORDER — LIDOCAINE 40 MG/G
CREAM TOPICAL
Status: DISCONTINUED | OUTPATIENT
Start: 2018-04-16 | End: 2018-06-15

## 2018-04-16 RX ORDER — ATORVASTATIN CALCIUM 40 MG/1
40 TABLET, FILM COATED ORAL DAILY
Status: DISCONTINUED | OUTPATIENT
Start: 2018-04-16 | End: 2018-06-15

## 2018-04-16 RX ORDER — TRAMADOL HYDROCHLORIDE 50 MG/1
50 TABLET ORAL EVERY 6 HOURS PRN
Status: DISCONTINUED | OUTPATIENT
Start: 2018-04-16 | End: 2018-06-15

## 2018-04-16 RX ORDER — DEXTROSE MONOHYDRATE 25 G/50ML
25-50 INJECTION, SOLUTION INTRAVENOUS
Status: DISCONTINUED | OUTPATIENT
Start: 2018-04-16 | End: 2018-06-15

## 2018-04-16 RX ORDER — NICOTINE POLACRILEX 4 MG
15-30 LOZENGE BUCCAL
Status: DISCONTINUED | OUTPATIENT
Start: 2018-04-16 | End: 2018-06-15

## 2018-04-16 RX ORDER — ALBUTEROL SULFATE 90 UG/1
2 AEROSOL, METERED RESPIRATORY (INHALATION) EVERY 6 HOURS PRN
Status: DISCONTINUED | OUTPATIENT
Start: 2018-04-16 | End: 2018-06-15

## 2018-04-16 RX ADMIN — ATORVASTATIN CALCIUM 40 MG: 40 TABLET, FILM COATED ORAL at 21:29

## 2018-04-16 RX ADMIN — FLUTICASONE PROPIONATE 2 SPRAY: 50 SPRAY, METERED NASAL at 22:18

## 2018-04-16 RX ADMIN — ALBUTEROL SULFATE 2 PUFF: 90 AEROSOL, METERED RESPIRATORY (INHALATION) at 22:18

## 2018-04-16 RX ADMIN — TRAMADOL HYDROCHLORIDE 50 MG: 50 TABLET, COATED ORAL at 21:29

## 2018-04-16 RX ADMIN — CALCIUM ACETATE 667 MG: 667 CAPSULE ORAL at 21:29

## 2018-04-16 RX ADMIN — ASPIRIN 81 MG CHEWABLE TABLET 81 MG: 81 TABLET CHEWABLE at 21:29

## 2018-04-16 RX ADMIN — OMEPRAZOLE 20 MG: 20 CAPSULE, DELAYED RELEASE ORAL at 21:29

## 2018-04-16 ASSESSMENT — PAIN SCALES - GENERAL: PAINLEVEL: NO PAIN (0)

## 2018-04-16 NOTE — IP AVS SNAPSHOT
MRN:[id number]                      After Visit Summary   4/16/2018    Murray Nicholson    MRN: [id number]           Thank you!     Thank you for choosing [place] for your care. Our goal is always to provide you with excellent care. Hearing back from our patients is one way we can continue to improve our services. Please take a few minutes to complete the written survey that you may receive in the mail after you visit with us. Thank you!        Patient Information     Date Of Birth          1955        About your hospital stay     You were admitted on:  April 16, 2018 You last received care in the:  [address] [facility]    You were discharged on:  July 2, 2018        Reason for your hospital stay       Heart Transplant                  Who to Call     For medical emergencies, please call 911.  For non-urgent questions about your medical care, please call your primary care provider or clinic, [phone]  For questions related to your surgery, please call your surgery clinic        Attending Provider     Provider Specialty    Bobby Muse MD Cardiology    Klever Ernst MD Cardiology    Patito, Rony Cummins MD Thoracic Diseases       Primary Care Provider Office Phone # Fax #    Yahir Turcios [phone]       When to contact your care team       Call your cardiologist and or transplant coordinatior if you have any of the following: temperature greater than 101 degrees, increased shortness of breath or increased swelling.                  After Care Instructions     Activity       Your activity upon discharge: activity as tolerated, no driving for 4 weeks after surgery            Diet       Follow this diet upon discharge: Orders Placed This Encounter      Regular Diet Adult Thin Liquids (water, ice chips, juice, milk, gelatin, ice cream, etc)            Discharge Instructions           IV access       You are going home with the following vascular access  device: Hemodialysis            Monitor and record       blood glucose 4 times a day, before meals and at bedtime  blood pressure daily  pulse daily  weight every day            Wound care and dressings       Instructions to care for your wound at home: You have dissolvable sutures under your skin that do not need to be removed.  Pat wound dressing dry after showers.  Skin glue will eventually fall off in 1-2 weeks.     Keep wound clean and dry, showers are okay after discharge, but don't let spray hit directly on incision. No baths or swimming for 1 month.  Clean wounds twice a day for 2 weeks with microklenz spray if available or just soap and water. Cover chest tube sites with gauze until they stop draining, then leave open.  .                  Follow-up Appointments     Adult [facility] Follow-up and recommended labs and tests       Follow up with your heart transplant cardiology team on 7/10/2018 at 1:45 PM at the [facility].      Appointments on [place] and/or [facility] (with [facility] or [facility] provider or service). Call [phone] if you haven't heard regarding these appointments within 7 days of discharge.            Follow Up and recommended labs and tests       ______________________________________________________    [facility]   Phone: [phone]   Fax: [phone]    For restarting of outpatient dialysis on T-Th-Sat at 11 AM.   Vancomycin IV to follow dialysis run through 7/10/18.   Please arrive on 7/3/18 at 10:45 AM to complete paperwork.   _______________________________________________________                  Your next 10 appointments already scheduled     Jul 05, 2018  9:00 AM CDT   LAB with UU LAB GOLD WAITING   [facility] [place], Lab ([facility])    [address] [address]              Please do not eat 10-12 hours before your appointment if you are coming in fasting for labs on  lipids, cholesterol, or glucose (sugar). This does not apply to pregnant women. Water, hot tea and black coffee (with nothing added) are okay. Do not drink other fluids, diet soda or chew gum.            Jul 05, 2018  9:30 AM CDT   Procedure - 2.5 hour with U2A ROOM 15   [address] [facility] ([facility])    [address] [address]               Jul 05, 2018 10:30 AM CDT   Cath 90 Minute with UUHCVR3   [facility] ([facility])    [address] [address]   [phone]            Jul 10, 2018  2:00 PM CDT   (Arrive by 1:45 PM)   RETURN HEART TRANSPLANT with VERONICA Rocha CNP   [facility] ([facility] [place])    [address]  [address] [place] [address]   [phone]            Jul 17, 2018  7:45 AM CDT   LAB with UU LAB GOLD WAITING   [facility]    [address] [facility] [address]              Please do not eat 10-12 hours before your appointment if you are coming in fasting for labs on lipids, cholesterol, or glucose (sugar). This does not apply to pregnant women. Water, hot tea and black coffee (with nothing added) are okay. Do not drink other fluids, diet soda or chew gum.            Jul 17, 2018  8:15 AM CDT   XR CHEST 2 VIEWS with UUXR3   [facility]  [facility] ([facility])    [address] [facility] [address]   [phone]           Please bring a list of your current medicines to your exam. (Include vitamins, minerals and over-thecounter medicines.) Leave your valuables at home.  Tell your doctor if there is a chance you may be pregnant.  You do not need to do anything special for this exam.            Jul 17, 2018  8:30 AM CDT   Ech Complete with UUECHR2   [facility]   "Echocardiography ([facility])    [address] [facility] [address] [phone]           1. Please bring or wear a comfortable two-piece outfit. 2. You may eat, drink and take your normal medicines. 3. For any questions that cannot be answered, please contact the ordering physician            Jul 17, 2018 10:00 AM CDT   Procedure 1.5 hr with U2A ROOM 17   [address] [facility] ([facility])    [address] [facility] [address]               Jul 17, 2018 11:30 AM CDT   Cath 90 Minute with UUHCVR5   [facility] ([facility])    [address] [facility] [id number] [phone]            Jul 18, 2018  2:00 PM CDT   (Arrive by 1:45 PM)   RETURN HEART TRANSPLANT with VERONICA Rocha [facility] ([facility] [place])    [address] [place] [address]  [address] [place] [address] [phone]              Additional Services     CARDIAC REHAB REFERRAL       *This therapy referral will be filtered to a centralized scheduling office at [facility] and the patient will receive a call to schedule an appointment at a [place] location most convenient for them.*     PREFERRED LOCATION: [address] [address]  Phone: [phone]    If you have not heard from the scheduling office within 2 business days, please call [phone] for all locations, with the exception of [place], please call [phone].    Please be aware that coverage of these services is subject to the terms and limitations of your health insurance plan.  Call member services at your health plan with any benefit or coverage questions.      **Note to Provider:  If you are referring outside of [place] for the therapy appointment, please list the name of the location in the \"special instructions\" above, print " the referral and give to the patient to schedule the appointment.            Medication Therapy Management Referral       MTM referral reason            Patient has 5 PTA or Discharge Medications AND one of the following   diagnoses: DM,HF,COPD,AMI DX,PULM HTN       This service is designed to help you get the most from your medications.  A specially trained pharmacist will work closely with you and your doctors  to solve any problems related to your medications and to help you get the   best results from taking them.      The Medication Therapy Management staff will call you to schedule an appointment.            NUTRITION REFERRAL       Your provider has referred you to: [facility]: [facility] (on call location)  - [place] [phone]   [email or web address]    Please be aware that coverage of these services is subject to the terms and limitations of your health insurance plan.  Call member services at your health plan with any benefit or coverage questions.      Please bring the following with you to your appointment:    (1) This referral request  (2) Any documents given to you regarding this referral  (3) Any specific questions you have about diet and/or food choices                  Further instructions from your care team       Discharge RN: Please fax discharge orders, latest labs and nephrology discharge summary to [facility] (Fax: [phone])    Nutrition  Diet: Regular diet as tolerated, or as recommended by your team/doctors  *Post-Transplant Diet Guidelines - Follow recommendations in the transplant book (summary below).    -  Maintain a healthy weight.  -  Eat a heart-healthy diet (low sodium, low saturated and trans-fat) once your appetite improves to normal.  -  Control blood sugar.  -  Limit sodium (salt).  -  Take calcium and vitamin D supplement if your doctor or team orders.  -  Eat more protein for six to eight weeks after transplant.     -  Prevent food poisoning, store and prepare foods to the proper temperature, practice good handwashing, heat all deli meat (to 165 degrees Fahrenheit), avoid raw fish and meats (including uncooked eggs, non-pasteurized or raw milk, and non-pasteurized or raw cheeses including brie, camembert, blue-veined cheese, and Mexican queso fresco), and throw out leftovers older than two days.   -  In some cases (but not in all cases), adjust potassium intake.        [facility]      AFTER YOU GO HOME FROM YOUR HEART SURGERY    You had a full sternotomy, so avoid lifting anything greater than ten pounds for 6 weeks after surgery and then less than 20 pounds for an additional 6 weeks.    No driving for 4 weeks after surgery or while on pain medication.     Avoid strenuous activities such as bowling, vacuuming, raking, shoveling, golf or tennis for 12 weeks after your surgery. It is okay to resume sex if you feel comfortable in doing so. You may have to try different positions with your partner.     Splint your chest incision by hugging a pillow or bringing your arms across your chest when coughing or sneezing. Avoid pushing off with your arms when getting up for the first month if you have had your sternum opened.    Shower or wash your incisions daily with soap and water (or as instructed), pat dry. Keep wound clean and dry, showers are okay after discharge, but don't let spray hit directly on incision. No baths or swimming for 1 month.  Clean wounds twice a day for 2 weeks with microklenz spray if available or just soap and water. Cover chest tube sites with gauze until they stop draining, then leave open. It is not abnormal for chest tube sites to drain yellowish/clear fluid for up to 2-3 weeks after surgery.   Watch for signs of infection: increased redness, tenderness, warmth or any drainage that appears infected (pus like) or is persistent.  Also a temperature > 100.5 F or chills. Call your  surgeon or primary care provider's office immediately. Remove any skin glue left on incisions after 10-14 days. This will not affect your incision and can speed up healing.    Exercise is very important in your recovery. Please follow the guidelines set up for you in your cardiac rehab classes at the hospital. If outpatient cardiac rehab was ordered for you, we highly recommend you participate. If you have problems arranging your cardiac rehab, please call [phone].     Avoid sitting for prolonged periods of time, try to walk every hour during the day. If you have a leg incision, elevate your leg often when you are not walking.    Check your weight when you get home from the hospital and continue to check it daily through your recovery for at least a month. If you notice a weight gain of 2-3 pounds in a week, notify your primary care physician, cardiologist or surgeon.    Bowel activity may be slow after surgery. If necessary, you may take an over the counter laxative such as Milk of Magnesia or Miralax. You may have stool softeners prescribed (docusate sodium, Senokot). We recommend using stool softeners while using narcotics for pain (oxycodone/percocet, hydrocodone/vicodin).      Wean OFF of narcotics (oxycodone, dilaudid, hydrocodone) as soon as possible. You should continue taking acetaminophen as long as you have any surgical pain as the first choice for pain control.  Add narcotics as necessary for pain to be tolerable     DENTAL VISITS AFTER SURGERY  Please notify your dentist before any procedure for the proper treatment needed. The antibiotic is taken by mouth one hour prior to visit. This includes routine cleanings.    DO NOT SMOKE.  IF YOU NEED HELP QUITTING, PLEASE TALK WITH YOUR CARDIOLOGIST OR PRIMARY DOCTOR.      You had a heart transplant call your Transplant Coordinator for follow-up care and management.     REGARDING PRESCRIPTION REFILLS.  If you need a refill on your pain medication contact  us to discuss your pain and a possible one time refill.   All other medications will be adjusted, discontinued and re-filled by your primary care physician and/or your cardiologist as they were prior to your surgery. We have given you enough for one to three month with possibly one refill.    POST-OPERATIVE CLINIC VISITS  You have a follow-up visit with your cardiology team for your first post-op heart transplant visit on 7/10/2018 at 1:45PM at the [facility].   If there is a need to return to see CT Surgery please call our  at [phone].    SURGICAL QUESTIONS  Please call Amrita Tobin with any surgical recovery and medication questions, her phone number is listed below.  She can assist you with your needs and contact other surgery care team members as indicated.    On weekends or after hours, please call [phone] and ask the  to   page the Cardiothoracic Surgery fellow on call.      Thank you,    Your Cardiothoracic Surgery Team  Amrita Tobin RN Care Coordinator-  [phone]   Lauren Andre NPRODERICK                     Pending Results     Date and Time Order Name Status Description    7/2/2018 1523 Tacrolimus level In process     6/14/2018 1951 Fungus Culture, non-blood Preliminary     6/14/2018 1951 AFB Culture Non Blood Preliminary             Statement of Approval     Ordered          07/02/18 1503  I have reviewed and agree with all the recommendations and orders detailed in this document.  EFFECTIVE NOW     Approved and electronically signed by:  Mary Alice Andre APRN CNP             Admission Information     Date & Time Provider Department Dept. Phone    4/16/2018 Rony Caputo MD [address] [facility] [phone]      Your Vitals Were     Blood Pressure Pulse Temperature Respirations Height Weight    91/51 (BP Location: Right arm) 103 98  F (36.7  C) (Oral) 18  "1.727 m (5' 8\") 78.4 kg (172 lb 12.8 oz)    Pulse Oximetry BMI (Body Mass Index)                99% 26.27 kg/m2          [employer] Information     [employer] gives you secure access to your electronic health record. If you see a primary care provider, you can also send messages to your care team and make appointments. If you have questions, please call your primary care clinic.  If you do not have a primary care provider, please call [phone] and they will assist you.        Care EveryWhere ID     This is your Care EveryWhere ID. This could be used by other organizations to access your [place] medical records  [id number]        Equal Access to Services     JOHN HAUSER : Aubrie Thomas, jose villa, marty crow, jose ayala. So [facility] [phone].    ATENCIÓN: Si habla español, tiene a ortiz disposición servicios gratuitos de asistencia lingüística. Llame al [phone].    We comply with applicable federal civil rights laws and [place] laws. We do not discriminate on the basis of race, color, national origin, age, disability, sex, sexual orientation, or gender identity.               Review of your medicines      START taking        Dose / Directions    acetaminophen 325 MG tablet   Commonly known as:  TYLENOL   Used for:  Acute post-operative pain        Dose:  650 mg   Take 2 tablets (650 mg) by mouth every 4 hours as needed for mild pain (multimodal surgical pain management along with NSAIDS and opioid medication as indicated based on pain control and physical function.)   Quantity:  100 tablet   Refills:  0       apixaban ANTICOAGULANT 2.5 MG tablet   Commonly known as:  ELIQUIS        Dose:  2.5 mg   Take 1 tablet (2.5 mg) by mouth 2 times daily   Quantity:  120 tablet   Refills:  0       cefpodoxime 200 MG tablet   Commonly known as:  VANTIN   Indication:  Possible post-op infection   Used for:  Leukocytosis, unspecified type        200 mg after " HD on Saturday 6/30/2018, Tuesday 7/3/2018 and Thursday 7/5/2018 then DC (total of 10 days).   Quantity:  10 tablet   Refills:  0       hydrALAZINE 100 MG Tabs tablet   Commonly known as:  APRESOLINE   Used for:  Heart transplant recipient (H)        Dose:  100 mg   Take 1 tablet (100 mg) by mouth every 6 hours   Quantity:  90 tablet   Refills:  0       * insulin isophane human 100 UNIT/ML injection   Commonly known as:  HumuLIN N PEN   Used for:  Type 2 diabetes mellitus with stage 5 chronic kidney disease not on chronic dialysis, without long-term current use of insulin (H)        Dose:  10 Units   Inject 10 Units Subcutaneous daily (with dinner)   Quantity:  3 mL   Refills:  0       * insulin isophane human 100 UNIT/ML injection   Commonly known as:  HumuLIN N PEN   Used for:  Type 2 diabetes mellitus with stage 5 chronic kidney disease not on chronic dialysis, without long-term current use of insulin (H)        Dose:  26 Units   Start taking on:  7/3/2018   Inject 26 Units Subcutaneous every morning (before breakfast)   Quantity:  3 mL   Refills:  0       ipratropium - albuterol 0.5 mg/2.5 mg/3 mL 0.5-2.5 (3) MG/3ML neb solution   Commonly known as:  DUONEB   Used for:  Dyspnea on exertion        Dose:  3 mL   Take 1 vial (3 mLs) by nebulization every 4 hours as needed for shortness of breath / dyspnea   Quantity:  360 mL   Refills:  0       isosorbide dinitrate 10 MG tablet   Commonly known as:  ISORDIL   Used for:  Hypertension goal BP (blood pressure) < 130/80        Dose:  10 mg   Take 1 tablet (10 mg) by mouth 3 times daily   Quantity:  120 tablet   Refills:  0       melatonin 1 MG Tabs tablet        Dose:  1 mg   Take 1 tablet (1 mg) by mouth nightly as needed for sleep   Quantity:  120 tablet   Refills:  0       metroNIDAZOLE 250 MG tablet   Commonly known as:  FLAGYL   Indication:  Possible post-op infection   Used for:  Postoperative infection, initial encounter        Dose:  250 mg   Take 1 tablet (250  mg) by mouth every 8 hours for 4 days   Quantity:  12 tablet   Refills:  0       mycophenolate 250 MG capsule   Commonly known as:  GENERIC EQUIVALENT        Dose:  1500 mg   Take 6 capsules (1,500 mg) by mouth 2 times daily   Quantity:  360 capsule   Refills:  2       NEPHROCAPS 1 MG capsule        Dose:  1 capsule   Take 1 capsule by mouth daily   Quantity:  120 capsule   Refills:  0       nystatin 383817 UNIT/ML suspension   Commonly known as:  MYCOSTATIN   Used for:  Heart transplant recipient (H)        Dose:  8084683 Units   Swish and swallow 10 mLs (1,000,000 Units) in mouth 4 times daily   Quantity:  400 mL   Refills:  2       pantoprazole 40 MG EC tablet   Commonly known as:  PROTONIX   Used for:  Heart transplant recipient (H)        Dose:  40 mg   Start taking on:  7/3/2018   Take 1 tablet (40 mg) by mouth every morning   Quantity:  30 tablet   Refills:  0       pravastatin 40 MG tablet   Commonly known as:  PRAVACHOL   Used for:  Dyslipidemia        Dose:  40 mg   Take 1 tablet (40 mg) by mouth daily   Quantity:  90 tablet   Refills:  0       * predniSONE 5 MG tablet   Commonly known as:  DELTASONE   Used for:  Heart transplant recipient (H)        Dose:  15 mg   Take 3 tablets (15 mg) by mouth every evening   Quantity:  100 tablet   Refills:  0       * predniSONE 20 MG tablet   Commonly known as:  DELTASONE   Used for:  Heart transplant recipient (H)        Dose:  20 mg   Start taking on:  7/3/2018   Take 1 tablet (20 mg) by mouth every morning   Quantity:  30 tablet   Refills:  0       senna-docusate 8.6-50 MG per tablet   Commonly known as:  SENOKOT-S;PERICOLACE   Used for:  Constipation, unspecified constipation type        Dose:  1-2 tablet   Take 1-2 tablets by mouth 2 times daily as needed for constipation   Quantity:  100 tablet   Refills:  0       simethicone 80 MG chewable tablet   Commonly known as:  MYLICON   Used for:  Constipation, unspecified constipation type        Dose:  80 mg   Take 1  tablet (80 mg) by mouth every 6 hours as needed for cramping   Quantity:  180 tablet   Refills:  0       sulfamethoxazole-trimethoprim 400-80 MG per tablet   Commonly known as:  BACTRIM/SEPTRA   Indication:  PCP prophylaxis   Used for:  Heart transplant recipient (H)        Take 1 half-tablet by mouth daily.   Quantity:  120 tablet   Refills:  1       * tacrolimus 0.5 MG capsule   Commonly known as:  GENERIC EQUIVALENT   Used for:  Heart transplant recipient (H)        Dose:  2.5 mg   Take 5 capsules (2.5 mg) by mouth every evening   Quantity:  150 capsule   Refills:  2       * tacrolimus 1 MG capsule   Commonly known as:  GENERIC EQUIVALENT   Used for:  Heart transplant recipient (H)        Dose:  3 mg   Take 3 capsules (3 mg) by mouth every morning   Quantity:  100 capsule   Refills:  2       terbutaline 5 MG tablet   Commonly known as:  BRETHINE        Dose:  5 mg   1 tablet (5 mg) by Oral or Feeding Tube route every 8 hours   Quantity:  120 tablet   Refills:  0       valGANciclovir 450 MG tablet   Commonly known as:  VALCYTE   Indication:  Medication Treatment to Prevent Cytomegalovirus Disease        Dose:  450 mg   Start taking on:  7/5/2018   Take 1 tablet (450 mg) by mouth twice a week   Quantity:  120 tablet   Refills:  0       vancomycin   Indication:  Possible post-op infection   Used for:  Leukocytosis, unspecified type        Administer following dialysis on Tuesday 7/3, Thursday 7/5, Saturday 7/7, and last dose on Tuesday 7/10.   Quantity:  4 Dose   Refills:  0       * Notice:  This list has 6 medication(s) that are the same as other medications prescribed for you. Read the directions carefully, and ask your doctor or other care provider to review them with you.      CONTINUE these medicines which may have CHANGED, or have new prescriptions. If we are uncertain of the size of tablets/capsules you have at home, strength may be listed as something that might have changed.        Dose / Directions     albuterol 108 (90 Base) MCG/ACT Inhaler   Commonly known as:  PROAIR HFA/PROVENTIL HFA/VENTOLIN HFA   This may have changed:    - how much to take  - when to take this  - reasons to take this   Used for:  Dyspnea on exertion        Dose:  6 puff   Inhale 6 puffs into the lungs every 4 hours as needed for shortness of breath / dyspnea   Quantity:  1 Inhaler   Refills:  0         CONTINUE these medicines which have NOT CHANGED        Dose / Directions    allopurinol 300 MG tablet   Commonly known as:  ZYLOPRIM   Used for:  Chronic gout of wrist, unspecified cause, unspecified laterality, Gout, unspecified cause, unspecified chronicity, unspecified site        Dose:  300 mg   Take 1 tablet (300 mg) by mouth daily   Quantity:  30 tablet   Refills:  2       aspirin 81 MG tablet        Take 1 tablet (81 mg) by mouth at bedtime   Refills:  0       bismuth subsalicylate 262 MG/15ML suspension   Commonly known as:  PEPTO BISMOL        Dose:  15 mL   Take 15 mLs by mouth every 6 hours as needed for indigestion   Refills:  0       blood glucose monitoring lancets   Used for:  Type 2 diabetes mellitus with stage 5 chronic kidney disease not on chronic dialysis, without long-term current use of insulin (H)        Use to test blood sugar 2-3 times daily or as directed.  Ok to substitute alternative if insurance prefers.   Quantity:  102 each   Refills:  11       blood glucose monitoring test strip   Commonly known as:  no brand specified   Used for:  Type 2 diabetes mellitus with stage 5 chronic kidney disease not on chronic dialysis, without long-term current use of insulin (H)        Use to test blood sugar 2-3 times daily or as directed.   Quantity:  100 each   Refills:  11       fluticasone 50 MCG/ACT spray   Commonly known as:  FLONASE   Used for:  Nasal congestion        Dose:  1-2 spray   Spray 1-2 sprays into both nostrils daily   Quantity:  16 g   Refills:  11       loratadine 10 MG tablet   Commonly known as:  CLARITIN    Used for:  Hypertensive cardiopathy, SOB (shortness of breath)        Dose:  10 mg   Take 10 mg by mouth daily as needed Reported on 5/3/2017   Refills:  0       order for DME   Used for:  CKD (chronic kidney disease) stage 5, GFR less than 15 ml/min (H)        Equipment being ordered: [employer] () Treatment Diagnosis: ESRD on PD Pt has to be connected to PD all night and can not be disconnected, hence impending his mobility to go to the bathroom. At risk for infection if he does not have this equipment.   Quantity:  1 Units   Refills:  0       traMADol 50 MG tablet   Commonly known as:  ULTRAM   Used for:  Acute post-operative pain        Dose:  50 mg   Take 1 tablet (50 mg) by mouth every 6 hours as needed for moderate pain   Quantity:  10 tablet   Refills:  0         STOP taking     atorvastatin 40 MG tablet   Commonly known as:  LIPITOR           B-COMPLEX PO           calcium acetate (Phos Binder) 667 MG Tabs           glipiZIDE 5 MG tablet   Commonly known as:  GLUCOTROL           midodrine HCl 10 MG Tabs           omeprazole 20 MG CR capsule   Commonly known as:  priLOSEC                Where to get your medicines      Some of these will need a paper prescription and others can be bought over the counter. Ask your nurse if you have questions.     Bring a paper prescription for each of these medications     acetaminophen 325 MG tablet    albuterol 108 (90 Base) MCG/ACT Inhaler    apixaban ANTICOAGULANT 2.5 MG tablet    cefpodoxime 200 MG tablet    hydrALAZINE 100 MG Tabs tablet    insulin isophane human 100 UNIT/ML injection    insulin isophane human 100 UNIT/ML injection    ipratropium - albuterol 0.5 mg/2.5 mg/3 mL 0.5-2.5 (3) MG/3ML neb solution    isosorbide dinitrate 10 MG tablet    melatonin 1 MG Tabs tablet    metroNIDAZOLE 250 MG tablet    mycophenolate 250 MG capsule    NEPHROCAPS 1 MG capsule    nystatin 368371 UNIT/ML suspension    pantoprazole 40 MG EC tablet    pravastatin 40 MG tablet     predniSONE 20 MG tablet    predniSONE 5 MG tablet    senna-docusate 8.6-50 MG per tablet    simethicone 80 MG chewable tablet    sulfamethoxazole-trimethoprim 400-80 MG per tablet    tacrolimus 0.5 MG capsule    tacrolimus 1 MG capsule    terbutaline 5 MG tablet    traMADol 50 MG tablet    valGANciclovir 450 MG tablet    vancomycin                Protect others around you: Learn how to safely use, store and throw away your medicines at www.disposemymeds.org.        ANTIBIOTIC INSTRUCTION     You've Been Prescribed an Antibiotic - Now What?  Your healthcare team thinks that you or your loved one might have an infection. Some infections can be treated with antibiotics, which are powerful, life-saving drugs. Like all medications, antibiotics have side effects and should only be used when necessary. There are some important things you should know about your antibiotic treatment.      Your healthcare team may run tests before you start taking an antibiotic.    Your team may take samples (e.g., from your blood, urine or other areas) to run tests to look for bacteria. These test can be important to determine if you need an antibiotic at all and, if you do, which antibiotic will work best.      Within a few days, your healthcare team might change or even stop your antibiotic.    Your team may start you on an antibiotic while they are working to find out what is making you sick.    Your team might change your antibiotic because test results show that a different antibiotic would be better to treat your infection.    In some cases, once your team has more information, they learn that you do not need an antibiotic at all. They may find out that you don't have an infection, or that the antibiotic you're taking won't work against your infection. For example, an infection caused by a virus can't be treated with antibiotics. Staying on an antibiotic when you don't need it is more likely to be harmful than helpful.      You may  experience side effects from your antibiotic.    Like all medications, antibiotics have side effects. Some of these can be serious.    Let you healthcare team know if you have any known allergies when you are admitted to the hospital.    One significant side effect of nearly all antibiotics is the risk of severe and sometimes deadly diarrhea caused by Clostridium difficile (C. Difficile). This occurs when a person takes antibiotics because some good germs are destroyed. Antibiotic use allows C. diificile to take over, putting patients at high risk for this serious infection.    As a patient or caregiver, it is important to understand your or your loved one's antibiotic treatment. It is especially important for caregivers to speak up when patients can't speak for themselves. Here are some important questions to ask your healthcare team.    What infection is this antibiotic treating and how do you know I have that infection?    What side effects might occur from this antibiotic?    How long will I need to take this antibiotic?    Is it safe to take this antibiotic with other medications or supplements (e.g., vitamins) that I am taking?     Are there any special directions I need to know about taking this antibiotic? For example, should I take it with food?    How will I be monitored to know whether my infection is responding to the antibiotic?    What tests may help to make sure the right antibiotic is prescribed for me?      Information provided by:  www.cdc.gov/getsmart  [place].S[place] Department of Health and Human Services  Centers for disease Control and Prevention  National Center for Emerging and Zoonotic Infectious Diseases  Division of Healthcare Quality Promotion        Information about OPIOIDS     PRESCRIPTION OPIOIDS: WHAT YOU NEED TO KNOW   We gave you an opioid (narcotic) pain medicine. It is important to manage your pain, but opioids are not always the best choice. You should first try all the other options your  care team gave you. Take this medicine for as short a time (and as few doses) as possible.     These medicines have risks:    DO NOT drive when on new or higher doses of pain medicine. These medicines can affect your alertness and reaction times, and you could be arrested for driving under the influence (DUI). If you need to use opioids long-term, talk to your care team about driving.    DO NOT operate heave machinery    DO NOT do any other dangerous activities while taking these medicines.     DO NOT drink any alcohol while taking these medicines.      If the opioid prescribed includes acetaminophen, DO NOT take with any other medicines that contain acetaminophen. Read all labels carefully. Look for the word  acetaminophen  or  Tylenol.  Ask your pharmacist if you have questions or are unsure.    You can get addicted to pain medicines, especially if you have a history of addiction (chemical, alcohol or substance dependence). Talk to your care team about ways to reduce this risk.    Store your pills in a secure place, locked if possible. We will not replace any lost or stolen medicine. If you don t finish your medicine, please throw away (dispose) as directed by your pharmacist. The [place] [employer] has more information about safe disposal: https://www.pca.[facility].[place]/living-green/managing-unwanted-medications.     All opioids tend to cause constipation. Drink plenty of water and eat foods that have a lot of fiber, such as fruits, vegetables, prune juice, apple juice and high-fiber cereal. Take a laxative (Miralax, milk of magnesia, Colace, Senna) if you don t move your bowels at least every other day.              Medication List: This is a list of all your medications and when to take them. Check marks below indicate your daily home schedule. Keep this list as a reference.      Medications           Morning Afternoon Evening Bedtime As Needed    acetaminophen 325 MG tablet   Commonly known as:   TYLENOL   Take 2 tablets (650 mg) by mouth every 4 hours as needed for mild pain (multimodal surgical pain management along with NSAIDS and opioid medication as indicated based on pain control and physical function.)   Last time this was given:  650 mg on 7/2/2018  7:52 AM                                albuterol 108 (90 Base) MCG/ACT Inhaler   Commonly known as:  PROAIR HFA/PROVENTIL HFA/VENTOLIN HFA   Inhale 6 puffs into the lungs every 4 hours as needed for shortness of breath / dyspnea                                allopurinol 300 MG tablet   Commonly known as:  ZYLOPRIM   Take 1 tablet (300 mg) by mouth daily   Last time this was given:  100 mg on 6/14/2018  8:01 AM                                apixaban ANTICOAGULANT 2.5 MG tablet   Commonly known as:  ELIQUIS   Take 1 tablet (2.5 mg) by mouth 2 times daily   Last time this was given:  2.5 mg on 7/2/2018  7:48 AM                                aspirin 81 MG tablet   Take 1 tablet (81 mg) by mouth at bedtime                                bismuth subsalicylate 262 MG/15ML suspension   Commonly known as:  PEPTO BISMOL   Take 15 mLs by mouth every 6 hours as needed for indigestion                                blood glucose monitoring lancets   Use to test blood sugar 2-3 times daily or as directed.  Ok to substitute alternative if insurance prefers.                                blood glucose monitoring test strip   Commonly known as:  no brand specified   Use to test blood sugar 2-3 times daily or as directed.                                cefpodoxime 200 MG tablet   Commonly known as:  VANTIN   200 mg after HD on Saturday 6/30/2018, Tuesday 7/3/2018 and Thursday 7/5/2018 then DC (total of 10 days).   Last time this was given:  200 mg on 6/30/2018  5:37 PM                                fluticasone 50 MCG/ACT spray   Commonly known as:  FLONASE   Spray 1-2 sprays into both nostrils daily   Last time this was given:  2 sprays on 6/13/2018  7:34 PM                                 hydrALAZINE 100 MG Tabs tablet   Commonly known as:  APRESOLINE   Take 1 tablet (100 mg) by mouth every 6 hours   Last time this was given:  100 mg on 7/2/2018  5:29 AM                                * insulin isophane human 100 UNIT/ML injection   Commonly known as:  HumuLIN N PEN   Inject 10 Units Subcutaneous daily (with dinner)   Last time this was given:  20 Units on 7/2/2018  7:44 AM                                * insulin isophane human 100 UNIT/ML injection   Commonly known as:  HumuLIN N PEN   Inject 26 Units Subcutaneous every morning (before breakfast)   Start taking on:  7/3/2018   Last time this was given:  20 Units on 7/2/2018  7:44 AM                                ipratropium - albuterol 0.5 mg/2.5 mg/3 mL 0.5-2.5 (3) MG/3ML neb solution   Commonly known as:  DUONEB   Take 1 vial (3 mLs) by nebulization every 4 hours as needed for shortness of breath / dyspnea                                isosorbide dinitrate 10 MG tablet   Commonly known as:  ISORDIL   Take 1 tablet (10 mg) by mouth 3 times daily   Last time this was given:  10 mg on 7/2/2018  9:47 AM                                loratadine 10 MG tablet   Commonly known as:  CLARITIN   Take 10 mg by mouth daily as needed Reported on 5/3/2017                                melatonin 1 MG Tabs tablet   Take 1 tablet (1 mg) by mouth nightly as needed for sleep   Last time this was given:  1 mg on 7/1/2018 11:02 PM                                metroNIDAZOLE 250 MG tablet   Commonly known as:  FLAGYL   Take 1 tablet (250 mg) by mouth every 8 hours for 4 days   Last time this was given:  250 mg on 7/2/2018  3:00 PM                                mycophenolate 250 MG capsule   Commonly known as:  GENERIC EQUIVALENT   Take 6 capsules (1,500 mg) by mouth 2 times daily   Last time this was given:  1,500 mg on 7/2/2018  5:23 PM                                NEPHROCAPS 1 MG capsule   Take 1 capsule by mouth daily   Last time this  was given:  1 capsule on 7/1/2018  8:28 PM                                nystatin 901600 UNIT/ML suspension   Commonly known as:  MYCOSTATIN   Swish and swallow 10 mLs (1,000,000 Units) in mouth 4 times daily   Last time this was given:  1,000,000 Units on 7/2/2018  5:23 PM                                order for DME   Equipment being ordered: [facility] () Treatment Diagnosis: ESRD on PD Pt has to be connected to PD all night and can not be disconnected, hence impending his mobility to go to the bathroom. At risk for infection if he does not have this equipment.                                pantoprazole 40 MG EC tablet   Commonly known as:  PROTONIX   Take 1 tablet (40 mg) by mouth every morning   Start taking on:  7/3/2018   Last time this was given:  40 mg on 7/2/2018  7:48 AM                                pravastatin 40 MG tablet   Commonly known as:  PRAVACHOL   Take 1 tablet (40 mg) by mouth daily   Last time this was given:  40 mg on 7/1/2018  8:28 PM                                * predniSONE 5 MG tablet   Commonly known as:  DELTASONE   Take 3 tablets (15 mg) by mouth every evening   Last time this was given:  20 mg on 7/2/2018  7:48 AM                                * predniSONE 20 MG tablet   Commonly known as:  DELTASONE   Take 1 tablet (20 mg) by mouth every morning   Start taking on:  7/3/2018   Last time this was given:  20 mg on 7/2/2018  7:48 AM                                senna-docusate 8.6-50 MG per tablet   Commonly known as:  SENOKOT-S;PERICOLACE   Take 1-2 tablets by mouth 2 times daily as needed for constipation   Last time this was given:  1 tablet on 6/30/2018  8:05 AM                                simethicone 80 MG chewable tablet   Commonly known as:  MYLICON   Take 1 tablet (80 mg) by mouth every 6 hours as needed for cramping   Last time this was given:  80 mg on 6/25/2018  8:37 PM                                sulfamethoxazole-trimethoprim 400-80 MG per tablet   Commonly  known as:  BACTRIM/SEPTRA   Take 1 half-tablet by mouth daily.   Last time this was given:  1 tablet on 7/2/2018  4:59 PM                                * tacrolimus 0.5 MG capsule   Commonly known as:  GENERIC EQUIVALENT   Take 5 capsules (2.5 mg) by mouth every evening   Last time this was given:  2.5 mg on 7/2/2018  5:23 PM                                * tacrolimus 1 MG capsule   Commonly known as:  GENERIC EQUIVALENT   Take 3 capsules (3 mg) by mouth every morning   Last time this was given:  2.5 mg on 7/2/2018  5:23 PM                                terbutaline 5 MG tablet   Commonly known as:  BRETHINE   1 tablet (5 mg) by Oral or Feeding Tube route every 8 hours   Last time this was given:  5 mg on 7/2/2018  4:59 PM                                traMADol 50 MG tablet   Commonly known as:  ULTRAM   Take 1 tablet (50 mg) by mouth every 6 hours as needed for moderate pain   Last time this was given:  50 mg on 7/2/2018 12:46 AM                                valGANciclovir 450 MG tablet   Commonly known as:  VALCYTE   Take 1 tablet (450 mg) by mouth twice a week   Start taking on:  7/5/2018   Last time this was given:  450 mg on 7/2/2018  7:53 AM                                vancomycin   Administer following dialysis on Tuesday 7/3, Thursday 7/5, Saturday 7/7, and last dose on Tuesday 7/10.   Last time this was given:  750 mg on 7/2/2018  3:05 PM                                * Notice:  This list has 6 medication(s) that are the same as other medications prescribed for you. Read the directions carefully, and ask your doctor or other care provider to review them with you.

## 2018-04-16 NOTE — IP AVS SNAPSHOT
[address] [address]    Phone:  [phone]                                       After Visit Summary   4/16/2018    Murray Nicholson    MRN: [id number]           After Visit Summary Signature Page     I have received my discharge instructions, and my questions have been answered. I have discussed any challenges I see with this plan with the nurse or doctor.    ..........................................................................................................................................  Patient/Patient Representative Signature      ..........................................................................................................................................  Patient Representative Print Name and Relationship to Patient    ..................................................               ................................................  Date                                            Time    ..........................................................................................................................................  Reviewed by Signature/Title    ...................................................              ..............................................  Date                                                            Time
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
## 2018-04-16 NOTE — PATIENT INSTRUCTIONS
You were seen today in the Cardiovascular Clinic at the [facility].     Cardiology Providers you saw during your visit: Kristi MARTIN CNP      Follow up and medication changes:  Patient sent to ER.     Results for SANCHEZ MCNEIL (MRN [id number]) as of 4/16/2018 18:12   Ref. Range 4/16/2018 15:46   Sodium Latest Ref Range: 133 - 144 mmol/L 135   Potassium Latest Ref Range: 3.4 - 5.3 mmol/L 5.0   Chloride Latest Ref Range: 94 - 109 mmol/L 101   Carbon Dioxide Latest Ref Range: 20 - 32 mmol/L 18 (L)   Urea Nitrogen Latest Ref Range: 7 - 30 mg/dL 63 (H)   Creatinine Latest Ref Range: 0.66 - 1.25 mg/dL 8.81 (H)   GFR Estimate Latest Ref Range: >60 mL/min/1.7m2 6 (L)   GFR Estimate If Black Latest Ref Range: >60 mL/min/1.7m2 7 (L)   Calcium Latest Ref Range: 8.5 - 10.1 mg/dL 9.6   Anion Gap Latest Ref Range: 3 - 14 mmol/L 15 (H)   Magnesium Latest Ref Range: 1.6 - 2.3 mg/dL 1.9   Phosphorus Latest Ref Range: 2.5 - 4.5 mg/dL 6.4 (H)   Albumin Latest Ref Range: 3.4 - 5.0 g/dL 3.5   Protein Total Latest Ref Range: 6.8 - 8.8 g/dL 7.4   Bilirubin Total Latest Ref Range: 0.2 - 1.3 mg/dL 0.8   Alkaline Phosphatase Latest Ref Range: 40 - 150 U/L 123   ALT Latest Ref Range: 0 - 70 U/L 22   AST Latest Ref Range: 0 - 45 U/L 17   Cholesterol Latest Ref Range: <200 mg/dL 152   HDL Cholesterol Latest Ref Range: >39 mg/dL 46   LDL Cholesterol Calculated Latest Ref Range: <100 mg/dL 60   Non HDL Cholesterol Latest Ref Range: <130 mg/dL 106   PSA Latest Ref Range: 0 - 4 ug/L 2.90   Triglycerides Latest Ref Range: <150 mg/dL 233 (H)   TSH Latest Ref Range: 0.40 - 4.00 mU/L 2.25   Glucose Latest Ref Range: 70 - 99 mg/dL 253 (H)   WBC Latest Ref Range: 4.0 - 11.0 10e9/L 5.7   Hemoglobin Latest Ref Range: 13.3 - 17.7 g/dL 12.3 (L)   Hematocrit Latest Ref Range: 40.0 - 53.0 % 37.4 (L)   Platelet Count Latest Ref Range: 150 - 450 10e9/L 275   RBC Count Latest Ref Range: 4.4 - 5.9 10e12/L 3.74 (L)   MCV Latest Ref Range: 78 -  "100 fl 100   MCH Latest Ref Range: 26.5 - 33.0 pg 32.9   MCHC Latest Ref Range: 31.5 - 36.5 g/dL 32.9   RDW Latest Ref Range: 10.0 - 15.0 % 15.7 (H)   Diff Method Unknown Automated Method   % Neutrophils Latest Units: % 72.9   % Lymphocytes Latest Units: % 16.5   % Monocytes Latest Units: % 5.3   % Eosinophils Latest Units: % 3.2   % Basophils Latest Units: % 1.2   % Immature Granulocytes Latest Units: % 0.9   Nucleated RBCs Latest Ref Range: 0 /100 1 (H)   Absolute Neutrophil Latest Ref Range: 1.6 - 8.3 10e9/L 4.2   Absolute Lymphocytes Latest Ref Range: 0.8 - 5.3 10e9/L 0.9   Absolute Monocytes Latest Ref Range: 0.0 - 1.3 10e9/L 0.3   Absolute Eosinophils Latest Ref Range: 0.0 - 0.7 10e9/L 0.2   Absolute Basophils Latest Ref Range: 0.0 - 0.2 10e9/L 0.1   Abs Immature Granulocytes Latest Ref Range: 0 - 0.4 10e9/L 0.1   Absolute Nucleated RBC Unknown 0.0   INR Latest Ref Range: 0.86 - 1.14  1.09   PTT Latest Ref Range: 22 - 37 sec 27         Please limit your fluid intake to 2 L (68 ounces) daily.  2 Liters a day = 8.5 cups, or 68 ounces.  Please limit your salt intake to 2 grams a day or less.     If you gain 2# in 24 hours or 5# in one week call Matilda Morales RN so we can adjust your medications as needed over the phone.     Please feel free to call me with any questions or concerns.       Matilda Morales RN  [facility]  Cardiology Care Coordinator-Heart Failure Clinic     Questions and [phone].   First press #1 for the [employer] and then press #3 for \"Medical Questions\" to reach us Cardiology Nurses.      On Call Cardiologist for after hours or on weekends: [phone]   option #4 and ask to speak to the on-call Cardiologist. Inform them you are a CORE/heart failure patient at the [facility].           If you need a medication refill please contact your pharmacy.  Please allow 3 business days for your refill to be " completed.  _______________________________________________________  C.O.R.E. CLINIC Cardiomyopathy, Optimization, Rehabilitation, Education   The C.O.R.E. CLINIC is a heart failure specialty clinic within the [facility] where you will work with specialized nurse practitioners dedicated to helping patients with heart failure carefully adjust medications, receive education, and learn who and when to call if symptoms develop. They specialize in helping you better understand your condition, slow the progression of your disease, improve the length and quality of your life, help you detect future heart problems before they become life threatening, and avoid hospitalizations.  As always, thank you for trusting us with your health care needs!

## 2018-04-16 NOTE — PROGRESS NOTES
"Outpatient MNT: Heart Transplant/VAD Evaluation    Current BMI: 24.9 (HT 69 in,  lbs/75 kg)  BMI is within criteria of <35 for heart transplant     Time Spent: 15 minutes  Visit Type: F/U (last seen by OP RD 5/2015)  Referring Physician: Klever  Pt accompanied by: self    History of previous txp: none    Nutrition Assessment  Pt cooks for self without added salt. He has been on HD since 1/2018 (prev on PD since 8/2017). Goes T/Th/S 615 am. Receives a protein drink there.     Appetite: \"okay\", eating TID Meals     Vitamins, Supplements, Pertinent Meds: b complex, phos binder  Herbal Medicines/Supplements: pt's relative mentioned black seed oil \"good for the heart\", but pt is debating taking it     Diet Recall  Breakfast Cereal, 1/2 bagel, eggs   Lunch Deli meat s/w or leftover chix s/w or more breakfast foods    Dinner chix + rice + frozen veggies   Snacks Protein bars at dialysis, some mixed nuts    Beverages Water, milk with cereal, 1 coffee with agave and half and half, some OJ or Naked juice, occasional ginger ale    Alcohol None    Dining out 2x/month      Physical Activity  None d/t fatigue      Anthropometrics  Height:   69 in   BMI:    24.9    Weight Status:Normal BMI   Weight:  164 lbs (4/5)            IBW (lb): 160  % IBW: 103    Wt Hx: Pt reports no edema nor major changes in weight. He reports intentionally losing ~70 lbs over the course of 2-3 years, first by intentionally eating less and by listening to body's hunger, then bringing more home cooked foods from home to work, and also cutting out alcohol.     Adj/dosing BW: 164 lbs/75 kg       Malnutrition  % Intake: No decreased intake noted  % Weight Loss: Weight loss does not meet criteria for malnutrition   Subcutaneous Fat Loss: None  Muscle Loss: None  Fluid Accumulation/Edema: None noted  Malnutrition Diagnosis: Patient does not meet two of the above criteria necessary for diagnosing malnutrition     Estimated Nutrition " Needs  Energy  5680-2994     (25-30 kcal/kg for maintenance)     Protein      (1.2-1.4 g/kg for dialysis needs)           Fluid  Pending MD   Micronutrient   Na+: <2000 mg/day              Nutrition Diagnosis  Food and nutrition related knowledge deficit r/t pre heart transplant eval AEB length of time since hearing pre/post transplant diet guidelines.    Nutrition Intervention  Nutrition education provided:  Discussed sodium intake (low sodium foods and drinks, seasoning food without salt and tips for low sodium diet). Reviewed need for adequate protein intake with dialysis and also to be wary of higher potassium foods, such as OJ, some Naked juices, etc, especially pending dialysis labs.     Reviewed black seed oil on [employer] - emailed patient description of it, as it is not available to the general public. However, black seed oil has documented interactions with immunosuppression.     Reviewed post txp diet guidelines in brief (will review in further detail post txp):  (1) Review of proper food safety measures d/t immunosuppressant therapy post-op and increased risk for food-borne illness   (2) Stressed importance of not taking any herbal/Chinese/alternative medicines or supplements post txp (d/t risk for rejection, unknown effects on the organs, potential interactions with immunosuppresants).   (3) Med regimen and possible side effects    Patient Understanding: Pt verbalized understanding of education provided.  Expected Compliance: Good  Follow-Up Plans: PRN     Nutrition Goals  1. Limit Na+ <2000mg/day  2. Pt to verbalize understanding of 3 aspects of post txp education provided   3. Ideal minimum protein intake of 90 g/day    Provided pt with contact info.   Scarlet Bowden RD, LD  [facility] [phone]

## 2018-04-16 NOTE — NURSING NOTE
Chief Complaint   Patient presents with     Follow Up For     Return CORE; 63 yr old male with a h/o chronic systolic HF secondary to ischemic cardiomyopathy and ESRD presenting for follow-up with labs prior     Vitals were taken and medications were reconciled.     JUDY Garcia  6:10 PM

## 2018-04-16 NOTE — IP AVS SNAPSHOT
"    UNIT [address] [facility]: [phone]                                              INTERAGENCY TRANSFER FORM - LAB / IMAGING / EKG / EMG RESULTS   2018                    Hospital Admission Date: 2018  SANCHEZ MCNEIL   : 1955  Sex: Male        Attending Provider: (none)    Allergies:  Norco [Hydrocodone-acetaminophen], Cats, Isosorbide, Penicillins, Seasonal Allergies, Shrimp    Infection:  None   Service:  CARDIOTHORAC    Ht:  1.727 m (5' 8\")   Wt:  78.4 kg (172 lb 12.8 oz)   Admission Wt:  75.4 kg (166 lb 4.8 oz)    BMI:  26.27 kg/m 2   BSA:  1.94 m 2            Patient PCP Information     Provider PCP Type    Yahir Turcios MD General         Lab Results - 3 Days      Tacrolimus level [[id number]]  Resulted: 18 1542, Result status: In process    Ordering provider: Bouchra Saunders MD  18 1523 Resulting lab: [facility]    Specimen Information    Type Source Collected On   Blood  18 1541            Glucose by meter [[id number]] (Abnormal)  Resulted: 18 1508, Result status: Final result    Ordering provider: Rony Caputo MD  18 1316 Resulting lab: POINT OF CARE TEST, GLUCOSE    Specimen Information    Type Source Collected On     18 1316          Components       Value Reference Range Flag Lab   Glucose 274 70 - 99 mg/dL H 170            Tacrolimus level [[id number]] (Abnormal)  Resulted: 18 1326, Result status: Final result    Ordering provider: Antony Mejía MD  18 2300 Resulting lab: [facility]    Specimen Information    Type Source Collected On   Blood  18 0601          Components       Value Reference Range Flag Lab   Tacrolimus Last Dose Not Provided   51   Tacrolimus Level 77.3 5.0 - 15.0 ug/L  51   Comment:         ESTIMATE  Critical Value called to and read back by  NANCY JJ AT 1326 ON 18 BY TB  Tacrolimus Reference Range  Kidney Transplant  Pediatric                      " ug/L    0-3 months post transplant   10-12    3-6 months post transplant   8-10    6-12 months post transplant  6-8    >12 months post transplant   4-7  Adult    0-6 months post transplant   8-10    6-12 months post transplant  6-8    >12 months post transplant   4-6    >5 years post transplant     3-5  Heart Transplant  Pediatric    0-12 months post transplant  10-15    >12 months post transplant   5-10  Adult    0-3 months post transplant   10-15    3-6 months post transplant   8-12    6-12 months post transplant  6-12    >12 months post transplant   6-10  Lung Transplant    0-12 months post transplant  10-15    >12 months post transplant   8-12  Liver Transplant  Pediatric    0-3 months post transplant   10-15    3-6 months post transplant   8-10    >6 months post transplant    6-8  Adult    0-3 months post trans  plant   10-12    3-6 months post transplant   8-10    >6 months post transplant    6-8  Pancreas Transplant    0-6 months post transplant   8-10    >6 months post transplant    5-8  This test was developed and its performance characteristics determined by the   [facility],  Special Chemistry Laboratory. It has   not been cleared or approved by the FDA. The laboratory is regulated under   CLIA as qualified to perform high-complexity testing. This test is used for   clinical purposes. It should not be regarded as investigational or for   research.              Glucose by meter [[id number]] (Abnormal)  Resulted: 07/02/18 1229, Result status: Final result    Ordering provider: Rony Caputo MD  07/02/18 1217 Resulting lab: POINT OF CARE TEST, GLUCOSE    Specimen Information    Type Source Collected On     07/02/18 1217          Components       Value Reference Range Flag Lab   Glucose 307 70 - 99 mg/dL H 170            Glucose by meter [[id number]] (Abnormal)  Resulted: 07/02/18 0722, Result status: Final result    Ordering provider: Rony Caputo MD  07/02/18  0710 Resulting lab: POINT OF CARE TEST, GLUCOSE    Specimen Information    Type Source Collected On     07/02/18 0710          Components       Value Reference Range Flag Lab   Glucose 192 70 - 99 mg/dL H 170            Basic metabolic panel [[id number]] (Abnormal)  Resulted: 07/02/18 0625, Result status: Final result    Ordering provider: Anmol Deal PA  07/01/18 2300 Resulting lab: [facility]    Specimen Information    Type Source Collected On   Blood  07/02/18 0601          Components       Value Reference Range Flag Lab   Sodium 128 133 - 144 mmol/L L 51   Potassium 5.2 3.4 - 5.3 mmol/L  51   Chloride 97 94 - 109 mmol/L  51   Carbon Dioxide 16 20 - 32 mmol/L L 51   Anion Gap 15 3 - 14 mmol/L H 51   Glucose 178 70 - 99 mg/dL H 51   Urea Nitrogen 59 7 - 30 mg/dL H 51   Creatinine 6.05 0.66 - 1.25 mg/dL H 51   GFR Estimate 9 >60 mL/min/1.7m2 L 51   Comment:  Non  GFR Calc   GFR Estimate If Black 11 >60 mL/min/1.7m2 L 51   Comment:  African American GFR Calc   Calcium 8.4 8.5 - 10.1 mg/dL L 51            CRP inflammation [[id number]]  Resulted: 07/02/18 0625, Result status: Final result    Ordering provider: Anmol Deal PA  07/01/18 2300 Resulting lab: [facility]    Specimen Information    Type Source Collected On   Blood  07/02/18 0601          Components       Value Reference Range Flag Lab   CRP Inflammation 7.3 0.0 - 8.0 mg/L  51            Vancomycin level [[id number]]  Resulted: 07/02/18 0624, Result status: Final result    Ordering provider: Marnie Brady [facility]  07/01/18 2300 Resulting lab: [facility]    Specimen Information    Type Source Collected On   Blood  07/02/18 0601          Components       Value Reference Range Flag Lab   Vancomycin Level 24.7 mg/L  51   Comment:         Traditional Dosing therapeutic Range:         Trough 8-20 mg/L         Peak 20-50 mg/L               CBC with platelets [[id number]] (Abnormal)  Resulted: 07/02/18 0611, Result status: Final result    Ordering provider: Anmol Deal PA  07/01/18 2300 Resulting lab: [facility]    Specimen Information    Type Source Collected On   Blood  07/02/18 0601          Components       Value Reference Range Flag Lab   WBC 10.8 4.0 - 11.0 10e9/L  51   RBC Count 3.03 4.4 - 5.9 10e12/L L 51   Hemoglobin 8.8 13.3 - 17.7 g/dL L 51   Hematocrit 27.3 40.0 - 53.0 % L 51   MCV 90 78 - 100 fl  51   MCH 29.0 26.5 - 33.0 pg  51   MCHC 32.2 31.5 - 36.5 g/dL  51   RDW 18.0 10.0 - 15.0 % H 51   Platelet Count 188 150 - 450 10e9/L  51            Blood culture [[id number]]  Resulted: 07/02/18 0606, Result status: Final result    Ordering provider: Marquis Villagomez PA-C  06/26/18 0723 Resulting lab: [facility]    Specimen Information    Type Source Collected On   Blood  06/26/18 0730   Comment:  Unspecified Site          Components       Value Reference Range Flag Lab   Specimen Description Blood Unspecified Site      Special Requests Received in aerobic bottle only   75   Culture Micro No growth   226            Blood culture [[id number]]  Resulted: 07/02/18 0606, Result status: Final result    Ordering provider: Marquis Villagomez PA-C  06/26/18 0723 Resulting lab: [facility]    Specimen Information    Type Source Collected On   Blood  06/26/18 0730   Comment:  Right Hand          Components       Value Reference Range Flag Lab   Specimen Description Blood Right Hand      Special Requests Received in aerobic bottle only   75   Culture Micro No growth   226            Glucose by meter [[id number]] (Abnormal)  Resulted: 07/01/18 2241, Result status: Final result    Ordering provider: Rony Caputo MD  07/01/18 2229 Resulting lab: POINT OF CARE TEST, GLUCOSE    Specimen Information    Type Source Collected On     07/01/18 2229          Components        Value Reference Range Flag Lab   Glucose 228 70 - 99 mg/dL H 170            Glucose by meter [[id number]] (Abnormal)  Resulted: 07/01/18 1850, Result status: Final result    Ordering provider: Rony Caputo MD  07/01/18 1838 Resulting lab: POINT OF CARE TEST, GLUCOSE    Specimen Information    Type Source Collected On     07/01/18 1838          Components       Value Reference Range Flag Lab   Glucose 256 70 - 99 mg/dL H 170   Comment:  /RN Notified            Glucose by meter [[id number]] (Abnormal)  Resulted: 07/01/18 1307, Result status: Final result    Ordering provider: Rony Caputo MD  07/01/18 1256 Resulting lab: POINT OF CARE TEST, GLUCOSE    Specimen Information    Type Source Collected On     07/01/18 1256          Components       Value Reference Range Flag Lab   Glucose 210 70 - 99 mg/dL H 170            Glucose by meter [[id number]] (Abnormal)  Resulted: 07/01/18 0806, Result status: Final result    Ordering provider: Rony Caputo MD  07/01/18 0754 Resulting lab: POINT OF CARE TEST, GLUCOSE    Specimen Information    Type Source Collected On     07/01/18 0754          Components       Value Reference Range Flag Lab   Glucose 262 70 - 99 mg/dL H 170            Procalcitonin [[id number]]  Resulted: 07/01/18 0711, Result status: Final result    Ordering provider: Anmol Deal PA  06/30/18 2300 Resulting lab: [facility]    Specimen Information    Type Source Collected On   Blood  07/01/18 0609          Components       Value Reference Range Flag Lab   Procalcitonin 1.25 ng/ml  51   Comment:         0.50-1.99 ng/ml  Moderate risk of systemic infection.  Recommendation:   Recommend antibiotics. Evaluate culture results and clinical features to   target antibacterial therapy. Obtain blood cultures and other relevant   cultures if not done.  If empiric antibiotics were started, recheck PCT in: 2 days to guide   antibiotic  de-escalation.  Discontinue or de-escalate antibiotics when PCT   concentration is <80% of peak or abs PCT <0.5. If empiric antibiotics were NOT   started, recheck PCT in 6-24 hours to re-evaluate need for antibiotics.              Basic metabolic panel [[id number]] (Abnormal)  Resulted: 07/01/18 0706, Result status: Final result    Ordering provider: Anmol Deal PA  06/30/18 2300 Resulting lab: [facility]    Specimen Information    Type Source Collected On   Blood  07/01/18 0609          Components       Value Reference Range Flag Lab   Sodium 131 133 - 144 mmol/L L 51   Potassium 4.6 3.4 - 5.3 mmol/L  51   Chloride 97 94 - 109 mmol/L  51   Carbon Dioxide 21 20 - 32 mmol/L  51   Anion Gap 14 3 - 14 mmol/L  51   Glucose 284 70 - 99 mg/dL H 51   Urea Nitrogen 40 7 - 30 mg/dL H 51   Creatinine 4.64 0.66 - 1.25 mg/dL H 51   GFR Estimate 13 >60 mL/min/1.7m2 L 51   Comment:  Non  GFR Calc   GFR Estimate If Black 16 >60 mL/min/1.7m2 L 51   Comment:  African American GFR Calc   Calcium 8.4 8.5 - 10.1 mg/dL L 51            CBC with platelets [[id number]] (Abnormal)  Resulted: 07/01/18 0631, Result status: Final result    Ordering provider: Anmol Deal PA  06/30/18 2300 Resulting lab: [facility]    Specimen Information    Type Source Collected On   Blood  07/01/18 0609          Components       Value Reference Range Flag Lab   WBC 9.6 4.0 - 11.0 10e9/L  51   RBC Count 2.93 4.4 - 5.9 10e12/L L 51   Hemoglobin 8.4 13.3 - 17.7 g/dL L 51   Hematocrit 25.5 40.0 - 53.0 % L 51   MCV 87 78 - 100 fl  51   MCH 28.7 26.5 - 33.0 pg  51   MCHC 32.9 31.5 - 36.5 g/dL  51   RDW 18.0 10.0 - 15.0 % H 51   Platelet Count 214 150 - 450 10e9/L  51            Glucose by meter [[id number]] (Abnormal)  Resulted: 06/30/18 2320, Result status: Final result    Ordering provider: Rony Caputo MD  06/30/18 9686 Resulting lab: POINT OF  CARE TEST, GLUCOSE    Specimen Information    Type Source Collected On     06/30/18 2308          Components       Value Reference Range Flag Lab   Glucose 185 70 - 99 mg/dL H 170            Glucose by meter [[id number]] (Abnormal)  Resulted: 06/30/18 1842, Result status: Final result    Ordering provider: Rony Caputo MD  06/30/18 1830 Resulting lab: POINT OF CARE TEST, GLUCOSE    Specimen Information    Type Source Collected On     06/30/18 1830          Components       Value Reference Range Flag Lab   Glucose 167 70 - 99 mg/dL H 170   Comment:  Dr/RN Notified            CRP inflammation [[id number]] (Abnormal)  Resulted: 06/30/18 1445, Result status: Final result    Ordering provider: Rony Caputo MD  06/30/18 0852 Resulting lab: [facility]    Specimen Information    Type Source Collected On     06/30/18 0852          Components       Value Reference Range Flag Lab   CRP Inflammation 12.0 0.0 - 8.0 mg/L H 51            Glucose by meter [[id number]]  Resulted: 06/30/18 1442, Result status: Final result    Ordering provider: Rony Caputo MD  06/30/18 1430 Resulting lab: POINT OF CARE TEST, GLUCOSE    Specimen Information    Type Source Collected On     06/30/18 1430          Components       Value Reference Range Flag Lab   Glucose 89 70 - 99 mg/dL  170            Tacrolimus level [[id number]]  Resulted: 06/30/18 1320, Result status: Final result    Ordering provider: Antony Mejía MD  06/29/18 2300 Resulting lab: [facility]    Specimen Information    Type Source Collected On   Blood  06/30/18 0548          Components       Value Reference Range Flag Lab   Tacrolimus Last Dose Not Provided   51   Tacrolimus Level 12.0 5.0 - 15.0 ug/L  51   Comment:         Tacrolimus Reference Range  Kidney Transplant  Pediatric                      ug/L    0-3 months post transplant   10-12    3-6 months post transplant    8-10    6-12 months post transplant  6-8    >12 months post transplant   4-7  Adult    0-6 months post transplant   8-10    6-12 months post transplant  6-8    >12 months post transplant   4-6    >5 years post transplant     3-5  Heart Transplant  Pediatric    0-12 months post transplant  10-15    >12 months post transplant   5-10  Adult    0-3 months post transplant   10-15    3-6 months post transplant   8-12    6-12 months post transplant  6-12    >12 months post transplant   6-10  Lung Transplant    0-12 months post transplant  10-15    >12 months post transplant   8-12  Liver Transplant  Pediatric    0-3 months post transplant   10-15    3-6 months post transplant   8-10    >6 months post transplant    6-8  Adult    0-3 months post transplant   10-12    3-6 months post transplant   8-10    >6 months post transplant    6-8  Pancrea  s Transplant    0-6 months post transplant   8-10    >6 months post transplant    5-8  This test was developed and its performance characteristics determined by the   [facility],  Special Chemistry Laboratory. It has   not been cleared or approved by the FDA. The laboratory is regulated under   CLIA as qualified to perform high-complexity testing. This test is used for   clinical purposes. It should not be regarded as investigational or for   research.              Glucose by meter [[id number]] (Abnormal)  Resulted: 06/30/18 0826, Result status: Final result    Ordering provider: Rony Caputo MD  06/30/18 0815 Resulting lab: POINT OF CARE TEST, GLUCOSE    Specimen Information    Type Source Collected On     06/30/18 0815          Components       Value Reference Range Flag Lab   Glucose 214 70 - 99 mg/dL H 170            Lactic acid level STAT for sepsis protocol [[id number]]  Resulted: 06/30/18 0806, Result status: Final result    Ordering provider: Bouchra Saunders MD  06/30/18 0710 Resulting lab: [facility]     Specimen Information    Type Source Collected On   Blood  06/30/18 0742          Components       Value Reference Range Flag Lab   Lactate for Sepsis Protocol 1.0 0.4 - 1.9 mmol/L  51            Basic metabolic panel [[id number]] (Abnormal)  Resulted: 06/30/18 0657, Result status: Final result    Ordering provider: Anmol Deal PA  06/29/18 2300 Resulting lab: [facility]    Specimen Information    Type Source Collected On   Blood  06/30/18 0548          Components       Value Reference Range Flag Lab   Sodium 134 133 - 144 mmol/L  51   Potassium 4.7 3.4 - 5.3 mmol/L  51   Chloride 99 94 - 109 mmol/L  51   Carbon Dioxide 18 20 - 32 mmol/L L 51   Anion Gap 16 3 - 14 mmol/L H 51   Glucose 207 70 - 99 mg/dL H 51   Urea Nitrogen 57 7 - 30 mg/dL H 51   Creatinine 6.27 0.66 - 1.25 mg/dL H 51   GFR Estimate 9 >60 mL/min/1.7m2 L 51   Comment:  Non  GFR Calc   GFR Estimate If Black 11 >60 mL/min/1.7m2 L 51   Comment:  African American GFR Calc   Calcium 8.6 8.5 - 10.1 mg/dL  51            Vancomycin level [[id number]]  Resulted: 06/30/18 0655, Result status: Final result    Ordering provider: Shakeel Melchor RPH  06/29/18 2300 Resulting lab: [facility]    Specimen Information    Type Source Collected On   Blood  06/30/18 0548          Components       Value Reference Range Flag Lab   Vancomycin Level 24.8 mg/L  51   Comment:         Traditional Dosing therapeutic Range:         Trough 8-20 mg/L         Peak 20-50 mg/L              CBC with platelets [[id number]] (Abnormal)  Resulted: 06/30/18 0632, Result status: Final result    Ordering provider: Anmol Deal PA  06/29/18 2300 Resulting lab: [facility]    Specimen Information    Type Source Collected On   Blood  06/30/18 0548          Components       Value Reference Range Flag Lab   WBC 12.5 4.0 - 11.0 10e9/L H 51   RBC Count 2.98 4.4  - 5.9 10e12/L L 51   Hemoglobin 8.6 13.3 - 17.7 g/dL L 51   Hematocrit 26.8 40.0 - 53.0 % L 51   MCV 90 78 - 100 fl  51   MCH 28.9 26.5 - 33.0 pg  51   MCHC 32.1 31.5 - 36.5 g/dL  51   RDW 17.9 10.0 - 15.0 % H 51   Platelet Count 219 150 - 450 10e9/L  51            Blood culture [[id number]]  Resulted: 06/30/18 0556, Result status: Final result    Ordering provider: Yaz Campbell MD  06/24/18 0001 Resulting lab: [facility]    Specimen Information    Type Source Collected On   Blood  06/24/18 0040   Comment:  Right Hand          Components       Value Reference Range Flag Lab   Specimen Description Blood Right Hand      Special Requests Received in aerobic bottle only   75   Culture Micro No growth   226            Blood culture [[id number]]  Resulted: 06/30/18 0556, Result status: Final result    Ordering provider: Yaz Campbell MD  06/24/18 0001 Resulting lab: [facility]    Specimen Information    Type Source Collected On   Blood  06/24/18 0001   Comment:  Right Arm          Components       Value Reference Range Flag Lab   Specimen Description Blood Right Arm      Special Requests Received in aerobic bottle only   75   Culture Micro No growth   226            Glucose by meter [[id number]] (Abnormal)  Resulted: 06/30/18 0248, Result status: Final result    Ordering provider: Rony Caputo MD  06/30/18 0237 Resulting lab: POINT OF CARE TEST, GLUCOSE    Specimen Information    Type Source Collected On     06/30/18 0237          Components       Value Reference Range Flag Lab   Glucose 203 70 - 99 mg/dL H 170            Glucose by meter [[id number]] (Abnormal)  Resulted: 06/29/18 2129, Result status: Final result    Ordering provider: Rony Caputo MD  06/29/18 2117 Resulting lab: POINT OF CARE TEST, GLUCOSE    Specimen Information    Type Source Collected On     06/29/18 2117          Components       Value Reference Range Flag Lab   Glucose 215 70 - 99 mg/dL H 170             Glucose by meter [[id number]] (Abnormal)  Resulted: 06/29/18 1905, Result status: Final result    Ordering provider: Rony Caputo MD  06/29/18 1854 Resulting lab: POINT OF CARE TEST, GLUCOSE    Specimen Information    Type Source Collected On     06/29/18 1854          Components       Value Reference Range Flag Lab   Glucose 236 70 - 99 mg/dL H 170            Glucose by meter [[id number]] (Abnormal)  Resulted: 06/29/18 1422, Result status: Final result    Ordering provider: Rony Caputo MD  06/29/18 1410 Resulting lab: POINT OF CARE TEST, GLUCOSE    Specimen Information    Type Source Collected On     06/29/18 1410          Components       Value Reference Range Flag Lab   Glucose 146 70 - 99 mg/dL H 170            Adenovirus antigen stool [[id number]]  Resulted: 06/29/18 1329, Result status: Final result    Ordering provider: Mack Richards MD  06/27/18 1436 Resulting lab: [facility]    Specimen Information    Type Source Collected On   Stool  06/28/18 2148          Components       Value Reference Range Flag Lab   Adenovirus AGN Feces Negative NEG^Negative  75            Glucose by meter [[id number]] (Abnormal)  Resulted: 06/29/18 1230, Result status: Final result    Ordering provider: Rony Caputo MD  06/29/18 1219 Resulting lab: POINT OF CARE TEST, GLUCOSE    Specimen Information    Type Source Collected On     06/29/18 1219          Components       Value Reference Range Flag Lab   Glucose 121 70 - 99 mg/dL H 170            Glucose by meter [[id number]] (Abnormal)  Resulted: 06/29/18 0756, Result status: Final result    Ordering provider: Rony Caputo MD  06/29/18 0744 Resulting lab: POINT OF CARE TEST, GLUCOSE    Specimen Information    Type Source Collected On     06/29/18 0744          Components       Value Reference Range Flag Lab   Glucose 194 70 - 99 mg/dL H 170            Basic metabolic panel [[id number]] (Abnormal)   Resulted: 06/29/18 0626, Result status: Final result    Ordering provider: Anmol Deal PA  06/28/18 2300 Resulting lab: [facility]    Specimen Information    Type Source Collected On   Blood  06/29/18 0545          Components       Value Reference Range Flag Lab   Sodium 136 133 - 144 mmol/L  51   Potassium 4.6 3.4 - 5.3 mmol/L  51   Chloride 101 94 - 109 mmol/L  51   Carbon Dioxide 23 20 - 32 mmol/L  51   Anion Gap 12 3 - 14 mmol/L  51   Glucose 210 70 - 99 mg/dL H 51   Urea Nitrogen 38 7 - 30 mg/dL H 51   Creatinine 4.77 0.66 - 1.25 mg/dL H 51   GFR Estimate 12 >60 mL/min/1.7m2 L 51   Comment:  Non  GFR Calc   GFR Estimate If Black 15 >60 mL/min/1.7m2 L 51   Comment:  African American GFR Calc   Calcium 8.6 8.5 - 10.1 mg/dL  51            PTT (AM Draw) [[id number]]  Resulted: 06/29/18 0623, Result status: Final result    Ordering provider: Rony Caputo MD  06/28/18 2300 Resulting lab: [facility]    Specimen Information    Type Source Collected On   Blood  06/29/18 0545          Components       Value Reference Range Flag Lab   PTT 30 22 - 37 sec  51            CBC with platelets [[id number]] (Abnormal)  Resulted: 06/29/18 0610, Result status: Final result    Ordering provider: Anmol Deal PA  06/28/18 2300 Resulting lab: [facility]    Specimen Information    Type Source Collected On   Blood  06/29/18 0545          Components       Value Reference Range Flag Lab   WBC 11.4 4.0 - 11.0 10e9/L H 51   RBC Count 2.98 4.4 - 5.9 10e12/L L 51   Hemoglobin 8.7 13.3 - 17.7 g/dL L 51   Hematocrit 26.2 40.0 - 53.0 % L 51   MCV 88 78 - 100 fl  51   MCH 29.2 26.5 - 33.0 pg  51   MCHC 33.2 31.5 - 36.5 g/dL  51   RDW 18.2 10.0 - 15.0 % H 51   Platelet Count 227 150 - 450 10e9/L  51            Testing Performed By     Lab - Abbreviation Name Director Address Valid Date Range    45 -  [id number] [facility] Unknown Unknown 02 0000 - Present    51 - Unknown [facility] [place] Unknown [address] [facility] [id number] 14 1010 - Present    75 - Unknown [facility] Unknown [address] [facility] [id number] 01/15/15 1019 - Present    170 - Unknown POINT OF CARE TEST, GLUCOSE Unknown Unknown 10/31/11 1114 - Present    226 - Unknown [facility] Unknown [address] [facility] [id number] 14 0955 - Present            Unresulted Labs (24h ago through future)    Start       Ordered    18 0600  Tacrolimus level  TIMED - ONCE,   Timed      18 1331    18 0600  CBC with platelets  AM DRAW,   Routine     Comments:  Last Lab Result: Hemoglobin (g/dL)       Date                     Value                 2018               9.2 (L)          ----------    18 0948    18 0600  Basic metabolic panel  AM DRAW,   Routine      18 0948    Unscheduled  Glucose - Diabetes  CONDITIONAL X 1 STAT,   STAT     Comments:  for changes in mental status, diaphoresis, or unexplained tachycardia    06/15/18 0011    Unscheduled  Glucose - Diabetes  CONDITIONAL X 1 STAT,   STAT     Comments:  for changes in mental status, diaphoresis, or unexplained tachycardia    18 2313         ECG/EMG Results      ECHO COMPLETE WITH OPTISON [[id number]]  Resulted: 18 1125, Result status: Edited Result - FINAL    Ordering provider: Tunde Coffey MD  18 0924 Resulted by: NAJMA Peng MD    Performed: 18 1143 - 18 1211 Resulting lab: RADIOLOGY RESULTS    Narrative:       [id number]  [id number]  [id number]  [id number]^JESSICA^TUNDE^           [facility],[place]  Echocardiography Laboratory  [address] [place] [address]     Name: SANCHEZ MCNEIL  MRN: [id number]  : 1955  Study Date: 2018 11:25 AM  Age: 63 yrs  Gender: Male  Patient Location:  [facility]  Reason For Study: Transplant - Heart  Ordering Physician: DARIEL LOPEZ  Referring Physician: YANELY NY  Performed By: Alisha Marquez RDCS     BSA: 1.9 m2  Height: 68 in  Weight: 165 lb  HR: 100  BP: 143/96 mmHg  _____________________________________________________________________________  __        Procedure  Echocardiogram with two-dimensional, color and spectral Doppler performed.  Contrast Optison. Optison (NDC #8323-3464-17) given intravenously. Patient was  given 5 ml mixture of 3 ml Optison and 6 ml saline. 4 ml wasted.  _____________________________________________________________________________  __        Interpretation Summary  Global and regional left ventricular function is hyperkinetic with an EF >70%.  Right ventricular function, chamber size, wall motion, and thickness are  normal.  No pericardial effusion is present.  _____________________________________________________________________________  __        Left Ventricle  Global and regional left ventricular function is hyperkinetic with an EF >70%.  Left ventricular wall thickness is normal. Left ventricular size is normal.  Left ventricular diastolic function is indeterminate. No regional wall motion  abnormalities are seen.     Right Ventricle  Right ventricular function, chamber size, wall motion, and thickness are  normal.     Atria  Both atria appear normal.     Mitral Valve  The mitral valve is normal.        Aortic Valve  Aortic valve is normal in structure and function.     Tricuspid Valve  The tricuspid valve is normal.     Pulmonic Valve  The pulmonic valve cannot be assessed.     Vessels  The aorta root cannot be assessed. The pulmonary artery cannot be assessed.  The inferior vena cava cannot be assessed.     Pericardium  No pericardial effusion is present.     _____________________________________________________________________________  __  MMode/2D Measurements & Calculations  IVSd: 0.91 cm  LVIDd: 4.4  cm  LVIDs: 2.2 cm  LVPWd: 1.1 cm  FS: 49.3 %  LV mass(C)d: 145.4 grams  LV mass(C)dI: 77.2 grams/m2     asc Aorta Diam: 3.3 cm  LVOT diam: 2.2 cm  LVOT area: 3.8 cm2  LA Volume (BP): 123.0 ml  LA Volume Index (BP): 65.4 ml/m2  RWT: 0.49        Doppler Measurements & Calculations  MV E max blake: 73.2 cm/sec  MV A max blake: 36.8 cm/sec  MV E/A: 2.0  MV dec time: 0.18 sec  Ao V2 max: 134.0 cm/sec  Ao max P.0 mmHg     PA V2 max: 155.0 cm/sec  PA max P.6 mmHg  E/E' av.5  Lateral E/e': 5.5  Medial E/e': 11.5     _____________________________________________________________________________  __           Report approved by: Sunny Porter 2018 12:29 PM       1    Type Source Collected On     18 1125          View Image (below)        Echo limited (Adults Only) [[id number]]  Resulted: 18 1144, Result status: In process    Ordering provider: Tunde Coffey MD  18 0924 Performed: 18 1143 - 18 1143    Resulting lab: RADIANT             Echo guided cardiac biopsy [[id number]]  Resulted: 18 1243, Result status: Edited Result - FINAL    Ordering provider: Sherman Clark MD  18 1204 Resulted by: Callum Olvera MD    Performed: 18 1316 - 18 1316 Resulting lab: RADIOLOGY RESULTS    Narrative:       [id number]  [id number]  [id number]  [id number]^KARINA^SEHRMAN^           [facility],[place]  Echocardiography Laboratory  [address] [place] [address]     Name: SANCHEZ MCNEIL  MRN: [id number]  : 1955  Study Date: 2018 12:43 PM  Age: 63 yrs  Gender: Male  Patient Location: [facility]  Reason For Study: Endomyocardial Biopsy  Ordering Physician: SHERMAN CLARK  Referring Physician: YANELY NY  Performed By: Betzy Guzman RDCS, LIANAT     BSA: 1.9 m2  Height: 68 in  Weight: 170 lb  BP: 157/96 mmHg  _____________________________________________________________________________  __        Procedure  Echo Guided Myocardial  Biopsy.  _____________________________________________________________________________  __        Interpretation Summary  Limited intraprocedural echocardiogram for echo-guided endomyocardial biopsy.  Intraprocedural images show bioptome in the RV at the mid-septum.  Postprocedural images show no TR and no pericardial effusion.  _____________________________________________________________________________  __     _____________________________________________________________________________  __                          Report approved by: Sunny Huertas 06/20/2018 02:07 PM              _____________________________________________________________________________  __       1    Type Source Collected On     06/20/18 1243          View Image (below)              Encounter-Level Documents:     There are no encounter-level documents.      Order-Level Documents:     There are no order-level documents.

## 2018-04-16 NOTE — MR AVS SNAPSHOT
After Visit Summary   4/16/2018    Murray Nicholson    MRN: [id number]           Patient Information     Date Of Birth          1955        Visit Information        Provider Department      4/16/2018 3:00 PM Nubia Bowden RD [facility] Solid [facility] Transplant        Today's Diagnoses     Systolic heart failure (H)    -  1       Follow-ups after your visit        Your next 10 appointments already scheduled     Apr 16, 2018  3:00 PM CDT   NUTRITION VISIT with Nubia Bowden RD   [facility] Solid [facility] Transplant ([facility])    [address]  [address]  [facility] [id number] [phone]            Apr 16, 2018  3:30 PM CDT   (Arrive by 3:15 PM)   [facility]    [address] [place] [address]  [address]  [facility] [id number] [phone]            Apr 18, 2018  3:00 PM CDT   Cardiac Treatment with  Cardiac Rehab 1   [facility] [place], Cardiac Rehabilitation ([facility])    [address]  [facility] [address]   [phone]            Apr 25, 2018  8:00 AM CDT   (Arrive by 7:45 AM)   New Patient Visit with Wilian Lawrence MD   [facility] ([facility])    [address]  [address]  [facility] [id number] [phone]            Jun 22, 2018 10:00 AM CDT   (Arrive by 9:45 AM)   Return Visit with Darvin Tang MD   [facility] Rheumatology ([facility])    [address] [place] [address]  [address]  [facility] [id number] [phone]              Who to contact     If you have questions or need follow up information about today's clinic visit or your schedule please contact [facility] TR[facility] directly at [phone].  Normal or non-critical lab and imaging results will be  communicated to you by Yumithart, letter or phone within 4 business days after the clinic has received the results. If you do not hear from us within 7 days, please contact the clinic through Visual IQ[employer] or phone. If you have a critical or abnormal lab result, we will notify you by phone as soon as possible.  Submit refill requests through "[employer]" or call your pharmacy and they will forward the refill request to us. Please allow 3 business days for your refill to be completed.          Additional Information About Your Visit        YumitharOneflare Information     "[employer]" gives you secure access to your electronic health record. If you see a primary care provider, you can also send messages to your care team and make appointments. If you have questions, please call your primary care clinic.  If you do not have a primary care provider, please call [phone] and they will assist you.        Care EveryWhere ID     This is your Care EveryWhere ID. This could be used by other organizations to access your [place] medical records  [id number]         Blood Pressure from Last 3 Encounters:   04/05/18 91/61   04/04/18 90/60   03/28/18 106/63    Weight from Last 3 Encounters:   04/10/18 76.7 kg (169 lb)   04/05/18 74.8 kg (164 lb 14.4 oz)   04/04/18 75.4 kg (166 lb 3.2 oz)              Today, you had the following     No orders found for display       Primary Care Provider Office Phone # Fax #    Yahir Turcios [phone]       [address]  [facility] [address]        Equal Access to Services     JOHN HAUSER AH: Hadii aad ku hadasho Soomaali, waaxda luqadaha, qaybta kaalmada adeegyada, waxay jay hayhankn gavino ayala. So [facility] [phone].    ATENCIÓN: Si habla español, tiene a ortiz disposición servicios gratuitos de asistencia lingüística. Llame al [phone].    We comply with applicable federal civil rights laws and [place] laws. We do not discriminate on the basis of race, color, national origin, age,  disability, sex, sexual orientation, or gender identity.            Thank you!     Thank you for choosing [facility]  for your care. Our goal is always to provide you with excellent care. Hearing back from our patients is one way we can continue to improve our services. Please take a few minutes to complete the written survey that you may receive in the mail after your visit with us. Thank you!             Your Updated Medication List - Protect others around you: Learn how to safely use, store and throw away your medicines at www.disposemymeds.org.          This list is accurate as of 4/16/18  2:09 PM.  Always use your most recent med list.                   Brand Name Dispense Instructions for use Diagnosis    albuterol 108 (90 Base) MCG/ACT Inhaler    PROAIR HFA/PROVENTIL HFA/VENTOLIN HFA    1 Inhaler    Inhale 2 puffs into the lungs every 6 hours as needed for shortness of breath / dyspnea or wheezing    Cough       allopurinol 300 MG tablet    ZYLOPRIM    30 tablet    Take 1 tablet (300 mg) by mouth daily    Chronic gout of wrist, unspecified cause, unspecified laterality, Gout, unspecified cause, unspecified chronicity, unspecified site       aspirin 81 MG tablet      Take 1 tablet (81 mg) by mouth at bedtime        atorvastatin 40 MG tablet    LIPITOR    90 tablet    Take 1 tablet (40 mg) by mouth daily    CKD (chronic kidney disease) stage 3, GFR 30-59 ml/min, Hyperlipidemia LDL goal <100       B-COMPLEX PO      Take 1 tablet by mouth daily        bismuth subsalicylate 262 MG/15ML suspension    PEPTO BISMOL     Take 15 mLs by mouth every 6 hours as needed for indigestion        blood glucose monitoring lancets     102 each    Use to test blood sugar 2-3 times daily or as directed.  Ok to substitute alternative if insurance prefers.    Type 2 diabetes mellitus with stage 5 chronic kidney disease not on chronic dialysis, without long-term current use of insulin (H)       blood glucose  monitoring test strip    no brand specified    100 each    Use to test blood sugar 2-3 times daily or as directed.    Type 2 diabetes mellitus with stage 5 chronic kidney disease not on chronic dialysis, without long-term current use of insulin (H)       calcium acetate (Phos Binder) 667 MG Tabs     180 tablet    Take 1 tablet by mouth 3 times daily (with meals)    Hyperphosphatemia       fluticasone 50 MCG/ACT spray    FLONASE    16 g    Spray 1-2 sprays into both nostrils daily    Nasal congestion       glipiZIDE 5 MG tablet    GLUCOTROL    30 tablet    Take 1 tablet (5 mg) by mouth daily (with breakfast)    Type 2 diabetes mellitus with stage 5 chronic kidney disease not on chronic dialysis, without long-term current use of insulin (H)       loratadine 10 MG tablet    CLARITIN     Take 10 mg by mouth daily as needed Reported on 5/3/2017    Hypertensive cardiopathy, SOB (shortness of breath)       midodrine HCl 10 MG Tabs     90 tablet    Take 1 tablet by mouth three times a week    Hemodialysis-associated hypotension       omeprazole 20 MG CR capsule    priLOSEC     Take 20 mg by mouth daily At night        order for DME     1 Units    Equipment being ordered: Commode () Treatment Diagnosis: ESRD on PD Pt has to be connected to PD all night and can not be disconnected, hence impending his mobility to go to the bathroom. At risk for infection if he does not have this equipment.    CKD (chronic kidney disease) stage 5, GFR less than 15 ml/min (H)       traMADol 50 MG tablet    ULTRAM    50 tablet    Take 1 tablet (50 mg) by mouth every 6 hours as needed for moderate pain    Abdominal pain, generalized
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
## 2018-04-16 NOTE — MR AVS SNAPSHOT
After Visit Summary   4/16/2018    Sanchez Nicholson    MRN: [id number]           Patient Information     Date Of Birth          1955        Visit Information        Provider Department      4/16/2018 6:00 PM Kristi Ayon NP [facility] Heart Care        Today's Diagnoses     Bicuspid aortic valve    -  1    Coronary artery disease involving native coronary artery of native heart without angina pectoris        Ascending aortic aneurysm (H)        Chronic systolic heart failure (H)        CKD (chronic kidney disease) stage 5, GFR less than 15 ml/min (H)        Acute systolic congestive heart failure (H)        Mitral valve insufficiency, unspecified etiology          Care Instructions    You were seen today in the Cardiovascular Clinic at the [facility].     Cardiology Providers you saw during your visit: Kristi MARTIN, CNP      Follow up and medication changes:  Patient sent to ER.     Results for SANCHEZ NICHOLSON (MRN [id number]) as of 4/16/2018 18:12   Ref. Range 4/16/2018 15:46   Sodium Latest Ref Range: 133 - 144 mmol/L 135   Potassium Latest Ref Range: 3.4 - 5.3 mmol/L 5.0   Chloride Latest Ref Range: 94 - 109 mmol/L 101   Carbon Dioxide Latest Ref Range: 20 - 32 mmol/L 18 (L)   Urea Nitrogen Latest Ref Range: 7 - 30 mg/dL 63 (H)   Creatinine Latest Ref Range: 0.66 - 1.25 mg/dL 8.81 (H)   GFR Estimate Latest Ref Range: >60 mL/min/1.7m2 6 (L)   GFR Estimate If Black Latest Ref Range: >60 mL/min/1.7m2 7 (L)   Calcium Latest Ref Range: 8.5 - 10.1 mg/dL 9.6   Anion Gap Latest Ref Range: 3 - 14 mmol/L 15 (H)   Magnesium Latest Ref Range: 1.6 - 2.3 mg/dL 1.9   Phosphorus Latest Ref Range: 2.5 - 4.5 mg/dL 6.4 (H)   Albumin Latest Ref Range: 3.4 - 5.0 g/dL 3.5   Protein Total Latest Ref Range: 6.8 - 8.8 g/dL 7.4   Bilirubin Total Latest Ref Range: 0.2 - 1.3 mg/dL 0.8   Alkaline Phosphatase Latest Ref Range: 40 - 150 U/L 123   ALT Latest Ref Range: 0 - 70 U/L 22   AST Latest Ref Range:  0 - 45 U/L 17   Cholesterol Latest Ref Range: <200 mg/dL 152   HDL Cholesterol Latest Ref Range: >39 mg/dL 46   LDL Cholesterol Calculated Latest Ref Range: <100 mg/dL 60   Non HDL Cholesterol Latest Ref Range: <130 mg/dL 106   PSA Latest Ref Range: 0 - 4 ug/L 2.90   Triglycerides Latest Ref Range: <150 mg/dL 233 (H)   TSH Latest Ref Range: 0.40 - 4.00 mU/L 2.25   Glucose Latest Ref Range: 70 - 99 mg/dL 253 (H)   WBC Latest Ref Range: 4.0 - 11.0 10e9/L 5.7   Hemoglobin Latest Ref Range: 13.3 - 17.7 g/dL 12.3 (L)   Hematocrit Latest Ref Range: 40.0 - 53.0 % 37.4 (L)   Platelet Count Latest Ref Range: 150 - 450 10e9/L 275   RBC Count Latest Ref Range: 4.4 - 5.9 10e12/L 3.74 (L)   MCV Latest Ref Range: 78 - 100 fl 100   MCH Latest Ref Range: 26.5 - 33.0 pg 32.9   MCHC Latest Ref Range: 31.5 - 36.5 g/dL 32.9   RDW Latest Ref Range: 10.0 - 15.0 % 15.7 (H)   Diff Method Unknown Automated Method   % Neutrophils Latest Units: % 72.9   % Lymphocytes Latest Units: % 16.5   % Monocytes Latest Units: % 5.3   % Eosinophils Latest Units: % 3.2   % Basophils Latest Units: % 1.2   % Immature Granulocytes Latest Units: % 0.9   Nucleated RBCs Latest Ref Range: 0 /100 1 (H)   Absolute Neutrophil Latest Ref Range: 1.6 - 8.3 10e9/L 4.2   Absolute Lymphocytes Latest Ref Range: 0.8 - 5.3 10e9/L 0.9   Absolute Monocytes Latest Ref Range: 0.0 - 1.3 10e9/L 0.3   Absolute Eosinophils Latest Ref Range: 0.0 - 0.7 10e9/L 0.2   Absolute Basophils Latest Ref Range: 0.0 - 0.2 10e9/L 0.1   Abs Immature Granulocytes Latest Ref Range: 0 - 0.4 10e9/L 0.1   Absolute Nucleated RBC Unknown 0.0   INR Latest Ref Range: 0.86 - 1.14  1.09   PTT Latest Ref Range: 22 - 37 sec 27         Please limit your fluid intake to 2 L (68 ounces) daily.  2 Liters a day = 8.5 cups, or 68 ounces.  Please limit your salt intake to 2 grams a day or less.     If you gain 2# in 24 hours or 5# in one week call Matilda Morales RN so we can adjust your medications as needed  "over the phone.     Please feel free to call me with any questions or concerns.       Matilda Morales RN  [facility]  Cardiology Care Coordinator-Heart Failure Clinic     Questions and [phone].   First press #1 for the University and then press #3 for \"Medical Questions\" to reach us Cardiology Nurses.      On Call Cardiologist for after hours or on weekends: [phone]   option #4 and ask to speak to the on-call Cardiologist. Inform them you are a CORE/heart failure patient at the [facility].           If you need a medication refill please contact your pharmacy.  Please allow 3 business days for your refill to be completed.  _______________________________________________________  C.O.R.E. CLINIC Cardiomyopathy, Optimization, Rehabilitation, Education   The C.O.R.E. CLINIC is a heart failure specialty clinic within the [facility] where you will work with specialized nurse practitioners dedicated to helping patients with heart failure carefully adjust medications, receive education, and learn who and when to call if symptoms develop. They specialize in helping you better understand your condition, slow the progression of your disease, improve the length and quality of your life, help you detect future heart problems before they become life threatening, and avoid hospitalizations.  As always, thank you for trusting us with your health care needs!             Follow-ups after your visit        Your next 10 appointments already scheduled     2018  3:00 PM CDT   Cardiac Treatment with  Cardiac Rehab 1   [facility] [place], Cardiac Rehabilitation ([facility])    [address]  [facility] [address]   [phone]            2018  8:00 AM CDT   (Arrive by 7:45 AM)   New Patient Visit with Wilian Lawrence MD   [facility] " Clinic ([facility])    [address]  [address]  [facility] [id number] [phone]            Jun 22, 2018 10:00 AM CDT   (Arrive by 9:45 AM)   Return Visit with Darvin Tang MD   [facility] ([facility])    [address]  [address]  [facility] [id number] [phone]              Future tests that were ordered for you today     Open Standing Orders        Priority Remaining Interval Expires Ordered    Glucose monitor nursing POCT Routine [id number] PRN  4/16/2018    Glucose monitor nursing POCT Routine [id number] PRN  4/16/2018    Glucose monitor nursing POCT- IF PO (eating meals or on bolus enteral feedings), within 30 minutes prior to each meal and at bedtime. Routine 60/61 4 TIMES DAILY BEFORE MEALS & AT BEDTIME  4/16/2018    Magnesium STAT 1/1 CONDITIONAL X 1 STAT  4/16/2018    Potassium STAT 1/1 CONDITIONAL X 1 STAT  4/16/2018    EKG 12-lead STAT 100/100 CONDITIONAL (SPECIFY)  4/16/2018    Daily weights Routine 1/1 DAILY  4/16/2018    Oxygen: Nasal cannula Routine [id number] PRN  4/16/2018    Basic metabolic panel Routine 15/16 DAILY  4/16/2018    Magnesium Routine 15/16 DAILY  4/16/2018    Uric acid Routine 1/1 CONDITIONAL X 1  4/16/2018          Open Future Orders        Priority Expected Expires Ordered    UA with Microscopic Routine  4/16/2019 4/16/2018            Who to contact     If you have questions or need follow up information about today's clinic visit or your schedule please contact [facility] directly at [phone].  Normal or non-critical lab and imaging results will be communicated to you by MyChart, letter or phone within 4 business days after the clinic has received the results. If you do not hear from us within 7 days, please contact the clinic through MyChart or phone. If you have a critical or abnormal lab result, we will notify you by phone as soon as possible.  Submit refill requests  "through [facility] or call your pharmacy and they will forward the refill request to us. Please allow 3 business days for your refill to be completed.          Additional Information About Your Visit        [employer] Information     [facility] gives you secure access to your electronic health record. If you see a primary care provider, you can also send messages to your care team and make appointments. If you have questions, please call your primary care clinic.  If you do not have a primary care provider, please call [phone] and they will assist you.        Care EveryWhere ID     This is your Care EveryWhere ID. This could be used by other organizations to access your [facility] medical records  [id number]        Your Vitals Were     Pulse Height Pulse Oximetry BMI (Body Mass Index)          117 1.753 m (5' 9\") 99% 25.05 kg/m2         Blood Pressure from Last 3 Encounters:   04/16/18 92/64   04/16/18 110/75   04/05/18 91/61    Weight from Last 3 Encounters:   04/16/18 75.4 kg (166 lb 4.8 oz)   04/16/18 76.9 kg (169 lb 9.6 oz)   04/10/18 76.7 kg (169 lb)              Today, you had the following     No orders found for display       Primary Care Provider Office Phone # Fax #    Yahir Turcios [phone]       [address]  [facility] [address]        Equal Access to Services     JOHN HAUSER AH: Hadii marsha ku hadasho Soomaali, waaxda luqadaha, qaybta kaalmada mignon, jose ayala. So [facility] [phone].    ATENCIÓN: Si habla español, tiene a ortiz disposición servicios gratuitos de asistencia lingüística. Keely al [phone].    We comply with applicable federal civil rights laws and [place] laws. We do not discriminate on the basis of race, color, national origin, age, disability, sex, sexual orientation, or gender identity.            Thank you!     Thank you for choosing [facility]  for your care. Our goal is always to provide you with excellent care. Hearing " back from our patients is one way we can continue to improve our services. Please take a few minutes to complete the written survey that you may receive in the mail after your visit with us. Thank you!             Your Updated Medication List - Protect others around you: Learn how to safely use, store and throw away your medicines at www.disposemymeds.org.          This list is accurate as of 4/16/18  8:12 PM.  Always use your most recent med list.                   Brand Name Dispense Instructions for use Diagnosis    albuterol 108 (90 Base) MCG/ACT Inhaler    PROAIR HFA/PROVENTIL HFA/VENTOLIN HFA    1 Inhaler    Inhale 2 puffs into the lungs every 6 hours as needed for shortness of breath / dyspnea or wheezing    Cough       allopurinol 300 MG tablet    ZYLOPRIM    30 tablet    Take 1 tablet (300 mg) by mouth daily    Chronic gout of wrist, unspecified cause, unspecified laterality, Gout, unspecified cause, unspecified chronicity, unspecified site       aspirin 81 MG tablet      Take 1 tablet (81 mg) by mouth at bedtime        atorvastatin 40 MG tablet    LIPITOR    90 tablet    Take 1 tablet (40 mg) by mouth daily    CKD (chronic kidney disease) stage 3, GFR 30-59 ml/min, Hyperlipidemia LDL goal <100       B-COMPLEX PO      Take 1 tablet by mouth daily        bismuth subsalicylate 262 MG/15ML suspension    PEPTO BISMOL     Take 15 mLs by mouth every 6 hours as needed for indigestion        blood glucose monitoring lancets     102 each    Use to test blood sugar 2-3 times daily or as directed.  Ok to substitute alternative if insurance prefers.    Type 2 diabetes mellitus with stage 5 chronic kidney disease not on chronic dialysis, without long-term current use of insulin (H)       blood glucose monitoring test strip    no brand specified    100 each    Use to test blood sugar 2-3 times daily or as directed.    Type 2 diabetes mellitus with stage 5 chronic kidney disease not on chronic dialysis, without long-term  current use of insulin (H)       calcium acetate (Phos Binder) 667 MG Tabs     180 tablet    Take 1 tablet by mouth 3 times daily (with meals)    Hyperphosphatemia       fluticasone 50 MCG/ACT spray    FLONASE    16 g    Spray 1-2 sprays into both nostrils daily    Nasal congestion       glipiZIDE 5 MG tablet    GLUCOTROL    30 tablet    Take 1 tablet (5 mg) by mouth daily (with breakfast)    Type 2 diabetes mellitus with stage 5 chronic kidney disease not on chronic dialysis, without long-term current use of insulin (H)       loratadine 10 MG tablet    CLARITIN     Take 10 mg by mouth daily as needed Reported on 5/3/2017    Hypertensive cardiopathy, SOB (shortness of breath)       midodrine HCl 10 MG Tabs     90 tablet    Take 1 tablet by mouth three times a week    Hemodialysis-associated hypotension       omeprazole 20 MG CR capsule    priLOSEC     Take 20 mg by mouth daily At night        order for DME     1 Units    Equipment being ordered: Commode () Treatment Diagnosis: ESRD on PD Pt has to be connected to PD all night and can not be disconnected, hence impending his mobility to go to the bathroom. At risk for infection if he does not have this equipment.    CKD (chronic kidney disease) stage 5, GFR less than 15 ml/min (H)       traMADol 50 MG tablet    ULTRAM    50 tablet    Take 1 tablet (50 mg) by mouth every 6 hours as needed for moderate pain    Abdominal pain, generalized

## 2018-04-16 NOTE — LETTER
Transition Communication Hand-off for Care Transitions to Next Level of Care Provider    Name: Murray Nicholson  : 1955  MRN #: [id number]  Primary Care Provider: Yahir Turcios     Primary Clinic: [address]  [facility] [address]     Reason for Hospitalization:  systolic heart failure  Heart failure (H)  pre-transplant screening  Intraductal papillary mucinous neoplasm   Heart failure (H)  Myopathy  Heart transplanted (H)  Admit Date/Time: 2018  8:12 PM  Discharge Date: 18  Payor Source: Payor: OTHER TRANSPLANT / Plan: [employer] CHATT TRANSPLANT / Product Type: PPO   Readmission Assessment Measure (MERCED) Risk Score/category: very high  Reason for Communication Hand-off Referral: Multiple providers/specialties  Discharge Plan:   Concern for non-adherence with plan of care: no  Discharge Needs Assessment:  Needs       Most Recent Value    Equipment Currently Used at Home none    Transportation Available car    [facility]patient [facility] -- [facility] (Ph: [phone] Fax: [phone]]      Follow-up specialty is recommended: Yes    Follow-up plan:  Future Appointments  Date Time Provider Department Center          2018 9:00 AM UU LAB GOLD WAITING [facility] O   2018 9:30 AM U2A ROOM [address] [place] [address] O   2018 10:30 AM UUHCVR3 [facility] O   7/10/2018 2:00 PM Mellisa Suh APRN CNP [facility]   2018 7:45 AM UU LAB GOLD WAITING [facility] O   2018 8:15 AM UUXR3 [facility] O   2018 8:30 AM UUECHR2 [facility] O   2018 10:00 AM U2A ROOM 17 [facility] O   2018 11:30 AM UUHCVR5 [facility] O   2018 2:00 PM Mellisa Suh APRN CNP [facility]   2018 8:00 AM UC LAB UCLAB [facility]   2018 8:30 AM Mellisa Suh APRN CNP [facility]   2018 11:00 AM U2A ROOM [address] [place] [address] O   2018 12:00 PM UUHCVR4 UUHCL [place] O   8/10/2018 10:00 AM UU LAB GOLD WAITING UOPLB [place] O   8/10/2018 10:30  AM U2A ROOM 15 [facility] O   8/10/2018 11:30 AM UUHCVR3 [facility] hospital O   8/10/2018 2:30 PM Mellisa Suh APRN CNP [facility]   8/24/2018 9:00 AM UU LAB GOLD WAITING [facility] O   8/24/2018 9:30 AM U2A ROOM 17 [facility] O   8/24/2018 10:30 AM UUHCVR3 [facility] O   8/24/2018 1:30 PM Mellisa Suh APRN CNP [facility]   9/7/2018 8:00 AM UU LAB GOLD WAITING [facility] O   9/7/2018 8:30 AM U2A ROOM 9 [facility] O   9/7/2018 9:30 AM UUHCVR3 [facility] O   9/10/2018 11:10 AM UUXR1 [facility] O   9/10/2018 11:30 AM UUECH[address] O   9/10/2018 2:00 PM Mellisa Suh APRN CNP [facility]   10/8/2018 10:00 AM UU LAB GOLD WAITING [facility] O   10/8/2018 10:30 AM U2A ROOM 17 [facility] O   10/8/2018 11:30 AM UUHCVR3 [facility] O   10/8/2018 1:30 PM Mellisa Suh APRN CNP [facility]   11/5/2018 8:00 AM UU LAB GOLD WAITING [facility] O   11/5/2018 8:30 AM U2A ROOM 17 [facility] O   11/5/2018 9:30 AM UUHCVR3 [facility] O   11/5/2018 1:30 PM Mellisa Suh APRN CNP [facility]   12/3/2018 8:00 AM UU LAB GOLD WAITING [facility] O   12/3/2018 8:30 AM UUECHR2 [facility] O   12/3/2018 9:30 AM U2A ROOM 12 [facility] O   12/3/2018 10:30 AM UUHCVR3 [facility] hospital O   12/3/2018 2:00 PM Mellisa Suh APRN CNP [facility]   1/28/2019 2:00 PM Klever Ernst MD [facility]       Any outstanding tests or procedures:        Referrals     Future Labs/Procedures    CARDIAC REHAB REFERRAL     Comments:    *This therapy referral will be filtered to a centralized scheduling office at [facility] and the patient will receive a call to schedule an appointment at a [place] location most convenient for them.*     PREFERRED LOCATION: [place] [address]  Phone: [phone]    If you have not heard from the scheduling office within 2 business days,  "please call [phone] for all locations, with the exception of [place], please call [phone].    Please be aware that coverage of these services is subject to the terms and limitations of your health insurance plan.  Call member services at your health plan with any benefit or coverage questions.      **Note to Provider:  If you are referring outside of [place] for the therapy appointment, please list the name of the location in the \"special instructions\" above, print the referral and give to the patient to schedule the appointment.    Medication Therapy Management Referral     Comments:    MTM referral reason            Patient has 5 PTA or Discharge Medications AND one of the following   diagnoses: DM,HF,COPD,AMI DX,PULM HTN       This service is designed to help you get the most from your medications.  A specially trained pharmacist will work closely with you and your doctors  to solve any problems related to your medications and to help you get the   best results from taking them.      The Medication Therapy Management staff will call you to schedule an appointment.    NUTRITION REFERRAL     Scheduling Instructions:    Please call pt July 5th, 2018 or later to schedule appointment.    Comments:    Your provider has referred you to: [facility]: [facility] (on call location)  - [place] [phone]   [email or web address]    Please be aware that coverage of these services is subject to the terms and limitations of your health insurance plan.  Call member services at your health plan with any benefit or coverage questions.      Please bring the following with you to your appointment:    (1) This referral request  (2) Any documents given to you regarding this referral  (3) Any specific questions you have about diet and/or food choices            Key Recommendations:  Post hospitalization follow up.     JONATHAN BULLARD RN CC      "

## 2018-04-16 NOTE — IP AVS SNAPSHOT
` ` Patient Information     Patient Name Sex     Murray Nicholson ([id number]) Male 1955       Room Bed    6409 6409-01      Patient Demographics     Address Phone E-mail Address    [address]  [place] [id number] [phone] (Home)  [phone] (Work)  [phone] (Mobile) [email or web address]      Patient Ethnicity & Race     Ethnic Group Patient Race    American       Emergency Contact(s)     Name Relation Home Work Mobile    SILVERIO NICHOLSON Son [phone]    TYLER COMBS Friend [phone]    ROMEO NICHOLSON Son [phone]      Documents on File        Status Date Received Description       Documents for the Patient    Insurance Card  ()      Face Sheet Received () 09     Privacy Notice - [place] Received 03     Insurance Card  () 04 Preferred One    Insurance Card  () 05 Preferred One    Insurance Card  () 06 Med[employer]    Insurance Card  () 07 Cigna    Insurance Card  () 08     Insurance Card  () 09 [employer]    External Medication Information Consent Accepted () 02/09/10     Face Sheet Received () 02/09/10     Insurance Card  () 02/09/10     Patient ID Received () 05/08/15 [place]    Consent for Services - Hospital/Clinic Received () 12/29/10     Insurance Card Received () 12/29/10 [facility]    External Medication Information Consent Accepted () 11     Consent for Services - Hospital/Clinic Received () 11     Consent for Services - Hospital/Clinic       Insurance Card Received () 12 [facility]    Insurance Card  ()  [facility]    External Medication Information Consent Accepted () 12     Consent for Services - Hospital/Clinic Received () 12     Insurance Card Received () 12 [facility]    Consent for EHR  Access  13 Copied from existing Consent for services - C/HOD collected on 2012    [facility] Specified Other       Consent for Services - Hospital/Clinic  () 13 CONSENT FOR SERVICE    Consent for Services - [facility] Received 13 imaging center     Consent for Services - [facility]  13 GENERAL CONSENT FORM: SHARED EHR - ENGLISH    Consent for Services - Hospital/Clinic Received () 13     External Medication Information Consent Accepted 13     Insurance Card Received () 14     Consent for Services - Hospital/Clinic Received () 04/09/15     Advance Directives and Living Will Received 07/30/15 HEALTH CARE DIRECTIVE 7/24/15    Advance Directives and Living Will Not Received  VALIDTION OF AD 7/24/15    Privacy Notice - [place] Received 07/30/15     Insurance Card Received () 16 HP    Business/Insurance/Care Coordination/Health Form - Patient  16 FMLA    Consent for Services/Privacy Notice - Hospital/Clinic Received () 02/10/16     Consent for Services/Privacy Notice - Hospital/Clinic  () 16 CONSENT FOR SERVICE/PRIVACY NOTICE    HIM DAVID Authorization  16 Cigna    Insurance Card Received () 16 hp cigna    Consent for Services/Privacy Notice - Hospital/Clinic-Esign       Business/Insurance/Care Coordination/Health Form - Patient   Cigna Aranesp Approval 6/15/16-7/15/17    Insurance Card Received 16 HP/CIGNA    Business/Insurance/Care Coordination/Health Form - Patient   Cigna Injectafer Approval 16-17    Consent for Services/Privacy Notice - Hospital/Clinic Received () 02/15/17     Insurance Card Received 02/15/17 HP CIGNA    Patient ID Received 02/15/17 [place]    HIM DAVID Authorization  17 unknown/[facility]    HIM DAVID Authorization  17 GUARDIAN/ [facility]    Physical Therapy Certification Received 07/10/17 DOS 17, NO Cert Needed, IP    Business/Insurance/Care Coordination/Health  Form - Patient   CIGLUIS - ARANESP APPROVAL - 17-07/15/18 - 24 TREATMENTS    Care Everywhere Prospective Auth Received 17     Insurance Card Received 18 HP    Business/Insurance/Care Coordination/Health Form - Patient  18 TREATMENT AND SLEF MANAGEMENT PLAN REQUEST FORM 18 CIGNA    Business/Insurance/Care Coordination/Health Form - Patient  18 DISABILITY FORMS 18    Consent for Services/Privacy Notice - Hospital/Clinic Received 18     HIM DAVID Authorization - File Only  18 [facility]    Consent for Services - Hospital and Clinic       HIE Auth       Business/Insurance/Care Coordination/Health Form - Patient  18 END STAGE RENAL DISEASE MEDICAL EVIDENCE REPORT 7/10/17    Business/Insurance/Care Coordination/Health Form - Patient  18 END STAGE RENAL DISEASE MEDICAL EVIDENCE REPORT 7/10/17    CE Prospective Auth Received () 18 Authorization Document from Social Security Administration    System-Retrieved CE Auth Form Received () 18 External Authorization Form from Social Security Administration       Documents for the Encounter    Photograph   Left forearm extravasation (initial)    Photograph   Left forearm extravasation (24 hrs)    Assessment/Questionnaire  18 MRI HISTORY QUESTIONNAIRE AND CONTRAST NOTICE    Photograph   Left lower forearm extravasation 48hour    Monitoring Device Output  18 MONITORING STRIPS SHEET 2    Monitoring Device Output  18 SAVED EVENT REPORT    Photograph   Left forearm extravasation (7th day)    CMS IM for Patient Signature Received 18     Consent for Services - Informed  18 INFORMED CONSENT FOR KIDNEYS WITH KDPI > 85%    Monitoring Device Output  18 INITIAL MONITORING STRIPS    Monitoring Device Output  18 MONITORING STRIPS SHEET 2    Monitoring Device Output  18 SAVED EVENT REPORT    Cardiac Cath - HIM Scan   MacLab Report    Discharge  Attachment  (Deleted)  DOBUTAMINE (ENGLISH)    Cardiac Cath - HIM Scan   Final Cath Lab Report    Cardiac Cath - HIM Scan   Preliminary MacLab Report    Cardiac Cath Preliminary - HIM Scan   Cardiac Cath Preliminary report    Cardiac Cath - HIM Scan   Final Cath Lab Report    Cardiac Cath - HIM Scan   Preliminary Maclab Report    Cardiac Cath Preliminary - HIM Scan   Cardiac cath preliminary report    Cardiac Cath - HIM Scan   MacLab Report    Cardiac Cath Preliminary - HIM Scan   Cardiac Cath Preliminary report    Cardiac Cath - HIM Scan   Final Report Cath Lab      Admission Information     Attending Provider Admitting Provider Admission Type Admission Date/Time     Rony Caputo MD Urgent 04/16/18 2012    Discharge Date Hospital Service Auth/Cert Status Service Area    07/02/18 Cardiothoracic Surgery Incomplete [facility]    Unit Room/Bed Admission Status       [facility] [place]409[place]6409-01 Discharged (Confirmed)       Admission     Complaint    systolic heart failure, Heart failure (H), pre-transplant screening, Intraductal papillary mucinous neoplasm , Heart failure (H), Myopathy, Heart transplanted (H)      Hospital Account     Name Acct ID Class Status Primary Coverage    Murray Nicholson [id number] Inpatient Open OTHER TRANSPLANT - CIGNA CHATT TRANSPLANT            Guarantor Account (for Hospital Account #[id number])     Name Relation to Pt Service Area Active? Acct Type    Murray Nicholson Self FCS Yes Transplant    Address Phone          [address]  [place] [address] [phone](H)  [phone](O)              Coverage Information (for Hospital Account #[id number])     1. OTHER TRANSPLANT/CIGNA CHATT TRANSPLANT     F/O Payor/Plan Precert #    OTHER TRANSPLANT/CIGNA CHATT TRANSPLANT [id number]    Subscriber Subscriber #    Murray Nicholson [id number]    Address Phone    [employer] TRANSPLANT CLAIMS  [address] [place] [place] [address]           2. MEDICARE/MEDICARE     F/O  Payor/Plan Precert #    MEDICARE/MEDICARE     Subscriber Subscriber #    Murray Nicholson [id number]    Address Phone    ATTN CLAIMS  [address]  [place] [address] [phone]

## 2018-04-16 NOTE — LETTER
Murray Nicholson  [address]  [place] [address]      May 11, 2018    Dear Mr. Nicholson,    This letter is written in follow-up to your recent heart transplant evaluation at the [facility] [place]. As you are aware, we have received coverage approval from your insurance company(s), and therefore your name was added to the [facility] Heart transplant waiting list on today, May 11, 2018. You have been listed as Status 1B, which is the medium priority category for heart donor allocation.  In addition, you have also been re-listed to the Kidney transplant waiting list.    During the waiting period, an ALA or PRA ( antibody ) blood sample will need to be sent to the [facility] every 3 months.  This immunology blood sample will be used to match you with potential organ donors.  Your first sample should be sent in early August, 2018.   A package of special mailing boxes, called mailers, will be sent to you directly from our Outreach Lab Department.  These mailers can be reordered by calling [phone], [phone] or by calling [phone].  Please take the physician order and one  to your home laboratory when it is time to re-check your PRA.    In addition, you or your doctor(s) must notify the Transplant office at the number above if you should acquire any significant infections (such as pneumonia or abscesses), and/or experience any significant changes in your medical condition,and/or require hospitalization. During business hours, 8:30 AM - 4:00 PM Monday -Friday, please notify us at the above number. During non-business hours and on weekends or holidays, please use the emergency number,[phone] ([phone]) to notify the On-Call Transplant Coordinator.     Our program has physician and surgeon coverage 24 hours a day,365 days a year. If this coverage changes or there are substantial program changes, you will be notified in writing by letter.     This letter  will also serve as a reminder to complete your dental work and obtain any necessary cancer screening tests required before your transplant. This includes colonoscopy, as well as prostate screening for men, andmammogram and gynecologic testing for women. Your primary care clinic can assist you with arranging for these exams. Please remind your caregivers to forward copies of your records and final reports once your dental work andcancer screening is complete.     Finally, attached is a letter from the [employer] ([employer]). It describes the services and information offered to patients by [employer] and the Organ Procurement and Transplantation Network.    Sincerely,     Rm Carpenter RN   Transplant Coordinator  Thoracic Transplant Program    Encl. [employer] Letter.      CC:  Yahir Turcios MD

## 2018-04-17 ENCOUNTER — APPOINTMENT (OUTPATIENT)
Dept: INTERVENTIONAL RADIOLOGY/VASCULAR | Facility: CLINIC | Age: 63
DRG: 001 | End: 2018-04-17
Attending: PHYSICIAN ASSISTANT
Payer: COMMERCIAL

## 2018-04-17 LAB
ANION GAP SERPL CALCULATED.3IONS-SCNC: 18 MMOL/L (ref 3–14)
BUN SERPL-MCNC: 76 MG/DL (ref 7–30)
CALCIUM SERPL-MCNC: 9.5 MG/DL (ref 8.5–10.1)
CHLORIDE SERPL-SCNC: 102 MMOL/L (ref 94–109)
CMV IGG SERPL QL IA: <0.2 AI (ref 0–0.8)
CO2 SERPL-SCNC: 16 MMOL/L (ref 20–32)
CREAT SERPL-MCNC: 9.72 MG/DL (ref 0.66–1.25)
EBV VCA IGG SER QL IA: 7.1 AI (ref 0–0.8)
GFR SERPL CREATININE-BSD FRML MDRD: 5 ML/MIN/1.7M2
GLUCOSE BLDC GLUCOMTR-MCNC: 116 MG/DL (ref 70–99)
GLUCOSE BLDC GLUCOMTR-MCNC: 125 MG/DL (ref 70–99)
GLUCOSE BLDC GLUCOMTR-MCNC: 159 MG/DL (ref 70–99)
GLUCOSE BLDC GLUCOMTR-MCNC: 169 MG/DL (ref 70–99)
GLUCOSE BLDC GLUCOMTR-MCNC: 178 MG/DL (ref 70–99)
GLUCOSE BLDC GLUCOMTR-MCNC: 179 MG/DL (ref 70–99)
GLUCOSE BLDC GLUCOMTR-MCNC: 248 MG/DL (ref 70–99)
GLUCOSE BLDC GLUCOMTR-MCNC: 259 MG/DL (ref 70–99)
GLUCOSE BLDC GLUCOMTR-MCNC: 272 MG/DL (ref 70–99)
GLUCOSE BLDC GLUCOMTR-MCNC: 81 MG/DL (ref 70–99)
GLUCOSE BLDC GLUCOMTR-MCNC: 85 MG/DL (ref 70–99)
GLUCOSE SERPL-MCNC: 260 MG/DL (ref 70–99)
HBV CORE AB SERPL QL IA: NONREACTIVE
HBV SURFACE AB SERPL IA-ACNC: 28.18 M[IU]/ML
HBV SURFACE AG SERPL QL IA: NONREACTIVE
HCV AB SERPL QL IA: NONREACTIVE
HIV 1+2 AB+HIV1 P24 AG SERPL QL IA: NONREACTIVE
HSV1 IGG SERPL QL IA: 0.3 AI (ref 0–0.8)
HSV2 IGG SERPL QL IA: >8 AI (ref 0–0.8)
INTERPRETATION ECG - MUSE: NORMAL
INTERPRETATION ECG - MUSE: NORMAL
MAGNESIUM SERPL-MCNC: 2 MG/DL (ref 1.6–2.3)
POTASSIUM SERPL-SCNC: 4.8 MMOL/L (ref 3.4–5.3)
POTASSIUM SERPL-SCNC: 5.7 MMOL/L (ref 3.4–5.3)
POTASSIUM SERPL-SCNC: 6.2 MMOL/L (ref 3.4–5.3)
PRA SINGLE ANTIGEN IGG ANTIBODY: NORMAL
SODIUM SERPL-SCNC: 136 MMOL/L (ref 133–144)
T GONDII IGG SER QL: <3 IU/ML (ref 0–7.1)
VZV IGG SER QL IA: 5.7 AI (ref 0–0.8)

## 2018-04-17 PROCEDURE — C1769 GUIDE WIRE: HCPCS

## 2018-04-17 PROCEDURE — 5A1D70Z PERFORMANCE OF URINARY FILTRATION, INTERMITTENT, LESS THAN 6 HOURS PER DAY: ICD-10-PCS | Performed by: INTERNAL MEDICINE

## 2018-04-17 PROCEDURE — 25000128 H RX IP 250 OP 636: Performed by: RADIOLOGY

## 2018-04-17 PROCEDURE — 80048 BASIC METABOLIC PNL TOTAL CA: CPT | Performed by: STUDENT IN AN ORGANIZED HEALTH CARE EDUCATION/TRAINING PROGRAM

## 2018-04-17 PROCEDURE — 0J2TXYZ CHANGE OTHER DEVICE IN TRUNK SUBCUTANEOUS TISSUE AND FASCIA, EXTERNAL APPROACH: ICD-10-PCS | Performed by: RADIOLOGY

## 2018-04-17 PROCEDURE — 25000132 ZZH RX MED GY IP 250 OP 250 PS 637: Performed by: NURSE PRACTITIONER

## 2018-04-17 PROCEDURE — 36415 COLL VENOUS BLD VENIPUNCTURE: CPT | Performed by: STUDENT IN AN ORGANIZED HEALTH CARE EDUCATION/TRAINING PROGRAM

## 2018-04-17 PROCEDURE — 27210904 ZZH KIT CR6

## 2018-04-17 PROCEDURE — 90937 HEMODIALYSIS REPEATED EVAL: CPT

## 2018-04-17 PROCEDURE — 25800025 ZZH RX 258: Performed by: NURSE PRACTITIONER

## 2018-04-17 PROCEDURE — 25000132 ZZH RX MED GY IP 250 OP 250 PS 637: Performed by: STUDENT IN AN ORGANIZED HEALTH CARE EDUCATION/TRAINING PROGRAM

## 2018-04-17 PROCEDURE — 40000141 ZZH STATISTIC PERIPHERAL IV START W/O US GUIDANCE

## 2018-04-17 PROCEDURE — 93005 ELECTROCARDIOGRAM TRACING: CPT

## 2018-04-17 PROCEDURE — 36581 REPLACE TUNNELED CV CATH: CPT

## 2018-04-17 PROCEDURE — 36415 COLL VENOUS BLD VENIPUNCTURE: CPT | Performed by: NURSE PRACTITIONER

## 2018-04-17 PROCEDURE — 25000128 H RX IP 250 OP 636: Performed by: INTERNAL MEDICINE

## 2018-04-17 PROCEDURE — 93010 ELECTROCARDIOGRAM REPORT: CPT | Performed by: INTERNAL MEDICINE

## 2018-04-17 PROCEDURE — A9270 NON-COVERED ITEM OR SERVICE: HCPCS | Mod: GY | Performed by: STUDENT IN AN ORGANIZED HEALTH CARE EDUCATION/TRAINING PROGRAM

## 2018-04-17 PROCEDURE — 21400006 ZZH R&B CCU INTERMEDIATE UMMC

## 2018-04-17 PROCEDURE — 99233 SBSQ HOSP IP/OBS HIGH 50: CPT | Performed by: NURSE PRACTITIONER

## 2018-04-17 PROCEDURE — 84132 ASSAY OF SERUM POTASSIUM: CPT | Performed by: NURSE PRACTITIONER

## 2018-04-17 PROCEDURE — 25000131 ZZH RX MED GY IP 250 OP 636 PS 637: Performed by: STUDENT IN AN ORGANIZED HEALTH CARE EDUCATION/TRAINING PROGRAM

## 2018-04-17 PROCEDURE — C1750 CATH, HEMODIALYSIS,LONG-TERM: HCPCS

## 2018-04-17 PROCEDURE — 27210995 ZZH RX 272: Performed by: RADIOLOGY

## 2018-04-17 PROCEDURE — 00000146 ZZHCL STATISTIC GLUCOSE BY METER IP

## 2018-04-17 PROCEDURE — 83735 ASSAY OF MAGNESIUM: CPT | Performed by: STUDENT IN AN ORGANIZED HEALTH CARE EDUCATION/TRAINING PROGRAM

## 2018-04-17 RX ORDER — SODIUM POLYSTYRENE SULFONATE 15 G/60ML
30 SUSPENSION ORAL; RECTAL ONCE
Status: DISCONTINUED | OUTPATIENT
Start: 2018-04-17 | End: 2018-04-17

## 2018-04-17 RX ORDER — DEXTROSE MONOHYDRATE 25 G/50ML
25 INJECTION, SOLUTION INTRAVENOUS ONCE
Status: DISCONTINUED | OUTPATIENT
Start: 2018-04-17 | End: 2018-04-22

## 2018-04-17 RX ORDER — HEPARIN SODIUM 1000 [USP'U]/ML
3 INJECTION, SOLUTION INTRAVENOUS; SUBCUTANEOUS ONCE
Status: CANCELLED | OUTPATIENT
Start: 2018-04-17 | End: 2018-04-17

## 2018-04-17 RX ORDER — MIDODRINE HYDROCHLORIDE 5 MG/1
10 TABLET ORAL DAILY PRN
Status: DISCONTINUED | OUTPATIENT
Start: 2018-04-17 | End: 2018-04-17

## 2018-04-17 RX ORDER — HEPARIN SODIUM 1000 [USP'U]/ML
500 INJECTION, SOLUTION INTRAVENOUS; SUBCUTANEOUS CONTINUOUS
Status: DISCONTINUED | OUTPATIENT
Start: 2018-04-17 | End: 2018-04-29

## 2018-04-17 RX ORDER — DEXTROSE MONOHYDRATE 100 MG/ML
INJECTION, SOLUTION INTRAVENOUS CONTINUOUS
Status: DISPENSED | OUTPATIENT
Start: 2018-04-17 | End: 2018-04-17

## 2018-04-17 RX ORDER — HEPARIN SODIUM 1000 [USP'U]/ML
3 INJECTION, SOLUTION INTRAVENOUS; SUBCUTANEOUS ONCE
Status: COMPLETED | OUTPATIENT
Start: 2018-04-17 | End: 2018-04-17

## 2018-04-17 RX ORDER — LIDOCAINE HYDROCHLORIDE 10 MG/ML
1-30 INJECTION, SOLUTION EPIDURAL; INFILTRATION; INTRACAUDAL; PERINEURAL
Status: COMPLETED | OUTPATIENT
Start: 2018-04-17 | End: 2018-04-17

## 2018-04-17 RX ORDER — MIDODRINE HYDROCHLORIDE 5 MG/1
10 TABLET ORAL DAILY PRN
Status: DISCONTINUED | OUTPATIENT
Start: 2018-04-17 | End: 2018-06-15

## 2018-04-17 RX ORDER — IODIXANOL 320 MG/ML
50 INJECTION, SOLUTION INTRAVASCULAR ONCE
Status: COMPLETED | OUTPATIENT
Start: 2018-04-17 | End: 2018-04-17

## 2018-04-17 RX ADMIN — ATORVASTATIN CALCIUM 40 MG: 40 TABLET, FILM COATED ORAL at 21:06

## 2018-04-17 RX ADMIN — CALCIUM ACETATE 667 MG: 667 CAPSULE ORAL at 16:16

## 2018-04-17 RX ADMIN — INSULIN ASPART 1 UNITS: 100 INJECTION, SOLUTION INTRAVENOUS; SUBCUTANEOUS at 16:12

## 2018-04-17 RX ADMIN — ALTEPLASE 2 MG: 2.2 INJECTION, POWDER, LYOPHILIZED, FOR SOLUTION INTRAVENOUS at 08:59

## 2018-04-17 RX ADMIN — LIDOCAINE HYDROCHLORIDE 10 ML: 10 INJECTION, SOLUTION EPIDURAL; INFILTRATION; INTRACAUDAL; PERINEURAL at 13:46

## 2018-04-17 RX ADMIN — SODIUM CHLORIDE 300 ML: 9 INJECTION, SOLUTION INTRAVENOUS at 08:28

## 2018-04-17 RX ADMIN — Medication 500 UNITS: at 17:16

## 2018-04-17 RX ADMIN — ALTEPLASE 2 MG: 2.2 INJECTION, POWDER, LYOPHILIZED, FOR SOLUTION INTRAVENOUS at 18:41

## 2018-04-17 RX ADMIN — ALTEPLASE 2 MG: 2.2 INJECTION, POWDER, LYOPHILIZED, FOR SOLUTION INTRAVENOUS at 18:48

## 2018-04-17 RX ADMIN — CALCIUM ACETATE 667 MG: 667 CAPSULE ORAL at 19:03

## 2018-04-17 RX ADMIN — TRAMADOL HYDROCHLORIDE 50 MG: 50 TABLET, COATED ORAL at 21:15

## 2018-04-17 RX ADMIN — SODIUM CHLORIDE 250 ML: 9 INJECTION, SOLUTION INTRAVENOUS at 08:28

## 2018-04-17 RX ADMIN — OMEPRAZOLE 20 MG: 20 CAPSULE, DELAYED RELEASE ORAL at 21:06

## 2018-04-17 RX ADMIN — HEPARIN SODIUM 2500 UNITS: 1000 INJECTION, SOLUTION INTRAVENOUS; SUBCUTANEOUS at 13:47

## 2018-04-17 RX ADMIN — HUMAN INSULIN 8 UNITS: 100 INJECTION, SOLUTION SUBCUTANEOUS at 10:57

## 2018-04-17 RX ADMIN — DEXTROSE MONOHYDRATE: 100 INJECTION, SOLUTION INTRAVENOUS at 12:46

## 2018-04-17 RX ADMIN — HEPARIN SODIUM 500 UNITS/HR: 1000 INJECTION, SOLUTION INTRAVENOUS; SUBCUTANEOUS at 17:11

## 2018-04-17 RX ADMIN — IODIXANOL 10 ML: 320 INJECTION, SOLUTION INTRAVASCULAR at 13:47

## 2018-04-17 RX ADMIN — HEPARIN SODIUM 2500 UNITS: 1000 INJECTION, SOLUTION INTRAVENOUS; SUBCUTANEOUS at 13:46

## 2018-04-17 RX ADMIN — ALLOPURINOL 100 MG: 100 TABLET ORAL at 19:03

## 2018-04-17 RX ADMIN — Medication: at 08:29

## 2018-04-17 RX ADMIN — ASPIRIN 81 MG CHEWABLE TABLET 81 MG: 81 TABLET CHEWABLE at 21:06

## 2018-04-17 RX ADMIN — INSULIN ASPART 3 UNITS: 100 INJECTION, SOLUTION INTRAVENOUS; SUBCUTANEOUS at 07:45

## 2018-04-17 RX ADMIN — ALBUTEROL SULFATE 2 PUFF: 90 AEROSOL, METERED RESPIRATORY (INHALATION) at 21:13

## 2018-04-17 RX ADMIN — INSULIN ASPART 1 UNITS: 100 INJECTION, SOLUTION INTRAVENOUS; SUBCUTANEOUS at 19:03

## 2018-04-17 RX ADMIN — FLUTICASONE PROPIONATE 2 SPRAY: 50 SPRAY, METERED NASAL at 21:06

## 2018-04-17 ASSESSMENT — PAIN DESCRIPTION - DESCRIPTORS: DESCRIPTORS: HEADACHE

## 2018-04-17 NOTE — PROGRESS NOTES
HEMODIALYSIS TREATMENT NOTE    Date: 4/17/2018  Time: 6:57 PM    Data:  Pre Wt: 76.2 kg (167 lb 15.9 oz)   Desired Wt: 74.7 kg   Post Wt: 74.7 kg (164 lb 10.9 oz)  Weight gain: -1.5 kg   Weight change: 1.5 kg  Ultrafiltration - Post Run Net Total Removed (mL): 1500 mL  Ultrafiltration - Post Run Net Total Gain (mL): 0 mL  Vascular Access Status: Yes, secured and visible  Dialyzer Rinse: Streaked  Total Blood Volume Processed: 67.6  Total Dialysis (Treatment) Time:  3.5hrs    Lab:   No    Interventions/ Assessment:  Stable run, tolerated HD tx well.  Venous chamber clotted with a little over 1hr of HD tx left.  Able to rinse blood back.  Nephrology paged, ordered 500u heparin bolus and 500u/hr heparin during remainder of HD tx.  Restarted on new system with heparin bolus and infusion per order.  Both rinse backs were added to pt goal.  D10 infusion stopped once meal tray arrived.  , 1u insulin administered per SS order with meal.  Phoslo given with meal.  Pt stated would order another tray to eat after HD, charted the insulin and phoslo as 1200 dose d/t being NPO at that time.  Peripheral IV NS locked once D10 infusion stopped.  Friend bedside last half of HD tx.  CVC patent and functioning well, TPA locked per nephrology.  Hand off given to floor RN.  Advised repeat labs after 1930 to allow for chemistry to rebound into vascular space after dialysis.  Pt A&O x4, VSS ex baseline tachycardic with rate 100-110's.     Plan:    Per renal team.

## 2018-04-17 NOTE — PROGRESS NOTES
Patient Name: Murray Nicholson  Medical Record Number: [id number]  Today's Date: 4/17/2018    Procedure: right tunneled CVC exchange  Proceduralist: Dr. Urrutia, Dr. Azul    Sedation start time: none    Procedure start time: 1320  Puncture time: 1328  Procedure end time: 1400    Report given to: Iza JIMENEZ    Other Notes: Pt arrived to IR room 1 from 6C. Pt denies any questions or concerns regarding procedure. Pt positioned supine and monitored per protocol. Pt tolerated procedure without any noted complications. Pt transferred back to Dialysis.    Kyeligh Goncalves RN

## 2018-04-17 NOTE — PHARMACY-CONSULT NOTE
Patient is being treated for hyperkalemia. A pharmacy consult was initiated to review the patient's medication list for possible causes of hyperkalemia.    No medications on this patient's profile are likely to have the side effect of hyperkalemia.      Continue current medication regimen.     Niyah Trinh, PharmD, BCPS

## 2018-04-17 NOTE — PROGRESS NOTES
[facility]   Cardiology II Service / Advanced Heart Failure  Daily Progress Note  Date of Service: 4/17/2018      Patient: Murray Nicholson  MRN: [id number]  Admission Date: 4/16/2018  Hospital Day # 1    Assessment and Plan: Murray Nicholson is a 63 year old male DM Type II, SHEELA, GERD, HTN, Dyslipidemia, Severe AI, Severe MR, ESRD on HD, ICM with EF 15-20%. He presented per [facility] for decompensated heart failure.     ICM. 3/15/18 Echo notes EF 15-20%, mild-moderately reduced RV function, bicuspid AV with severe holodiastolic AI, and torn chordae to A3 P3 MR. RHC 3/29/18 consistent with mRA-9, RV-55/16, mPA-36, mPAW-32, BECKA CO-3, BECKA-1.6. NM Lexiscan 1/12/16 negative for ischemia. LHC 2/8/17 with no obstructive CAD. CPX 3/30/18 consistent with RER 1.23, VEVCO2 slope of 68, with FC of 31%. Secondary to ischemic and valvular etiology.   Stage D, NYHA Class IV  ACEi/ARB Losartan held due to dizziness.   BB contraindicated due to low cardiac output  Aldosterone antagonist contraindicated due to renal dysfunction  SCD prophylaxis Not indicated   Fluid status HD.   Sleep Apnea: CPAP.     Current diagnostics for LVAD/transplant workup:  Hep C, Hepatitis B, Transferrin, TSH, HSV Type I, CMV, PSA, Toxoplasmosis, ANDRE, LE US, Carotid US, Chest X-ray, and Hgb A1C negative.   Lipid profile with TC-152, Triglycerides-233, HDL-46, and LDL-60.   HSV Type II > 8, VZ > 5.7 consistent with prior exposure or immunity, and EBV 7.1.   Abd US notes prominent hepatic venous system.   CT Head consistent with right maxillary sinusitis.   Chest CT/ABd/Pevlis 1/7/18 consistent with Patchy consolidative and groundglass nodularity scattered throughout the bilateral lungs suspicious for infection or inflammation, and etiology could include typical or atypical organisms.  PFT's 4/2/18 FEV1 pred-88%, FEV1/FVC pred 75%, and FVC pred 87%.   6 min walk 840 ft.   Colonoscopy noted 3 polyps and one granulear area with recommendation of  "repeat in 5 years done on 12/14/11.   NS 4/2/18 consult notes tobacco use, quit 20 years ago, marijuana use, quite 2 years ago, and some concern for social support with no restrictions for transplant/LVAD candidacy.   SW and Palliative consult remain pending.     Severe AI and MR.   - Surgical consult pending workup above.     ESRD on HD with Hyperkalemia.   - Insulin and D5 per protocol.   - EKG without significant changes.   - repeat K 4.8.   - IR to assess tunneled line with plan for HD today.   - Midodrine 10 mg po prior to dialysis.     CAD.   - BB contraindicated in low output state.   - ASA 81 mg po daily and Lipitor.     Chronic Medical Conditions:  GERD. Prilosec.   DM Type II. Novolog medium intensity SSI. Glipizide on hold.   AAA. 4.5 x 4.5 cm proximal ascending aortic aneurysm seen on MRI done 2/14. Recent echo 2/2/18 showed size of 4.2 cm.     FEN: 2 gram NA diet   PROPHY: Prilosec  LINES: PIV  DISPO:  TBD.   CODE STATUS: Full Code   ================================================================    Interval History/ROS: He complains of SOB at rest with palpitations. He denies fever, chills, chest pain, cough, nausea, vomiting, diarrhea, and LE edema. He is NPO this AM. He is tolerating limited mobility due to MEADE.     Last 24 hr care team notes reviewed.   ROS:  4 point ROS including Respiratory, CV, GI and , other than that noted in the HPI, is negative.     Medications: Reviewed in EPIC.     Physical Exam:   BP (!) 132/92  Pulse 114  Temp 97.6  F (36.4  C) (Oral)  Resp 18  Ht 1.727 m (5' 8\")  Wt 76.2 kg (167 lb 15.9 oz)  SpO2 98%  BMI 25.54 kg/m2  GENERAL: Appears alert and oriented times three.   HEENT: Eye symmetrical and free of discharge bilaterally. Mucous membranes moist and without lesions.  NECK: Supple and without lymphadenopathy. JVD to ear lobe.   CV: Regular, tachycardic. S1S2 present with III/VI systolic murmur heard best at LSB.   RESPIRATORY: Respirations regular, even, and " unlabored. Lungs CTA throughout.   GI: Soft and non distended with normoactive bowel sounds present in all quadrants. No tenderness, rebound, guarding. No organomegaly.   EXTREMITIES: trace edema with cool extremities. 2+ bilateral pedal pulses.   NEUROLOGIC: Alert and orientated x 3. CN II-XII grossly intact. No focal deficits.   MUSCULOSKELETAL: No joint swelling or tenderness.   SKIN: No jaundice. No rashes or lesions.     Data:  CMP  Recent Labs  Lab 04/17/18  1441 04/17/18  1018 04/17/18  0559 04/16/18  1546   NA  --   --  136 135   POTASSIUM 4.8 5.7* 6.2* 5.0   CHLORIDE  --   --  102 101   CO2  --   --  16* 18*   ANIONGAP  --   --  18* 15*   GLC  --   --  260* 253*   BUN  --   --  76* 63*   CR  --   --  9.72* 8.81*   GFRESTIMATED  --   --  5* 6*   GFRESTBLACK  --   --  7* 7*   TRINI  --   --  9.5 9.6   MAG  --   --  2.0 1.9   PHOS  --   --   --  6.4*   PROTTOTAL  --   --   --  7.4   ALBUMIN  --   --   --  3.5   BILITOTAL  --   --   --  0.8   ALKPHOS  --   --   --  123   AST  --   --   --  17   ALT  --   --   --  22     CBC  Recent Labs  Lab 04/16/18  1546   WBC 5.7   RBC 3.74*   HGB 12.3*   HCT 37.4*      MCH 32.9   MCHC 32.9   RDW 15.7*        INR  Recent Labs  Lab 04/16/18  1546   INR 1.09       Patient seen and discussed with Dr. Muse.      Jennifer Dean [facility]  4/17/2018

## 2018-04-17 NOTE — PROGRESS NOTES
Patient of Dr. Ernst seen in clinic for psychosocial assessment.   90 minutes spent with patient. 100% of visit consisted of counseling related to issues surrounding lvad and heart transplant.    Psychosocial Assessment   Name: Murray Nicholson     MRN:  [id number]        Patient Name / Age / Race: Murray Nicholson 63 year old -American and    Source of Information: Patient   : Felicia Bee   Interview Date: April 16, 2018   Reason for Referral: Heart Transplant and Mechanical Circulatory Support     Current Living Situation   Location ([facility]/State): [address] [place] [address]   With Whom: Living arrangements - the patient lives alone.   Type of Home: Apartment with 3 flights of stairs to get from his parking spot to his 2nd floor apartment. He reports that he needs to take many breaks.    Working Phone? Yes  -cell phone only   [employer] Pr[employer]ed Outlet? Yes    Distance from Hospital: 10 miles   Need to Relocate Post Surgery? No   Discussed? No     Vocational/Employment/Financial   Employment: Full time   Occupation: Murray reports that he's been working at [employer] and his income is [employer] based. 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    Education: 2 years of college   ? No   Income: salary/wages and disabilty insurance   Insurance: Private Insurance   Name of Private Insurance: [employer] through employer   Medication Coverage: [employer] through employer    In current situation, pt. can afford medication and supply costs:  Yes    Other Financial Concerns/Issues: Murray will need to ask some questions regarding his Medicare eligibility due to dialysis. This might change his coverage.      Family/Social Support   Marital Status:    Partner Name: n/a   Length of Time : n/a   Partner s Employment: n/a   Relationship: other: n/a   Children: 2 adult sons: Jasper (age 27) and Jordi (age 25)   Parents: Murray reports  that he never knew his dad. His mom passed away last spring at age 90 due to dementia    Siblings: He has one half brother who lives in [facility]. He's 73 years old. Murray reports that they don't see each other often, but have good rapport   Other Family or Friends Close by: Friend -José Miguel   Support System: available, occasional   Primary Support Person: This was the first Murray had heard that he'd need 24 hour care after lvad or heart transplant. He needs to talk with his sons and friends to work out a plan.    Issues: No plan yet, but Murray believes he'll work something out.      Activities/Functional Ability   Current Level: ambulatory and independent with ADL's   Daily Activities: Murray reports that he continues to do his own cooking, shopping and cleaning. He reports more frequent trips to the store so he has less things to . He also reports many breaks throughout the day.    Transportation: Self and assistance from friends or sons.      Medical Status   Patient s understanding of need for surgical intervention: Murray reports that he has been on PD since August of 2017 and started HD in January 2018. He reports that he has only recently been referred for heart transplant. He has good understanding of limitations of lvad with dialysis.    Advanced Directive? No    Details: Murray wasn't sure if he had one completed. Provided him long form version. Offered additional clinic appointment to complete if it would be helpful.    Adherence History: Murray reports good with medications and appointments. SW was later informed that he had missed quite a few Nephrology appointments and has been deactivated from the kidney transplant waitlist.    Ability to Adhere to Complex Medical Regime: Murray reports that he understands the importance of following medical advice.      Behavioral   Chemical Dependency Issues: No   Murray reports that he quit drinking alcohol when he learned his kidneys weren't working any longer. He reports that  "he quit using mariajuana about 2 years ago. He reports no CD treatment and no DUIs.    Smoker? No   Murray reports that he quit more than 5 years ago.    Psychiatric impairment: No   Coping Style/Strategies: Murray reports that he uses humor and his optimistic attitude to cope. He also reports that he \"doesn't let it bug me\".   Recent Legal History: No   Teaching Completed During Assessment Related To Transplant and Mechanical Circulatory Support: 1. Housing and relocation needs post surgery.  2. Caregiver needs post surgery.  3. Financial issues related to surgery.  4. Risks of alcohol use post surgery.  5. Common psychosocial stressors pre/post surgery.  6. Support group availability.   Psychosocial Risks Reviewed Related To Transplant and Mechanical Circulatory Support: 1. Increased stress related to your emotional, family, social, employment, or financial situation.  2. Affect on work and/or disability benefits.  3. Affect on future health and life insurance.  4. Outcome expectations may not be met.  5. Mental Health risks: anxiety, depression, PTSD, guilt, grief and chronic fatigue.     Observed Behavior   Well informed? Yes  Angry? No   Process info well? Yes  Hostile? No   Evasive? No Oriented X3? Yes    Cautious/Suspicious? No Motivated? Yes    Appropriate Behavior? Yes  Depressed? No   Appropriate Affect? Yes  Anxious? No   Interview Observations:      Selection Criteria Met   Plan for Support Yes    Chemical Dependence Yes    Smoking Yes    Mental Health Yes    Adequate Finances Yes      Risk Issues:    1. No family was preset at the time of evaluation. It would be beneficial to have someone from the medical team talk with them about caregiver role. Murray reports no concerns that his 2 adult sons and friends will be able to work out a plan.     Final Evaluation/Assessment:     Met with Murray in clinic for heart transplant/ lvad psychosocial evaluation. He had already been assessed by the kidney team for possible " kidney transplant. Murray reports good support from his 2 adult sons and friends. He reports that they will be able to be his caregivers. They were not present and it would be beneficial for them to talk with a member of the medical team to be sure they understand the caregiver role. Murray reports that he is currently on disability through work and continues on his insurance through them. He has applied for SSDI and is waiting to hear if he was approved. He currently denies any financial problems and reports adequate insurance. He reports no concerns with tobacco use, chemical dependency, legal issues, or mental health.     Murray appears to be an acceptable candidate for lvad or heart transplant from psychosocial perspective.      Patient understands risks and benefits of Heart Transplant and Mechanical Circulatory Support: Yes     Recommendation:    acceptable    Signature: Felicia Bee     Title: Licensed Independent Clinical                Interview Date: April 16, 2018

## 2018-04-17 NOTE — CONSULTS
INTERVENTIONAL RADIOLOGY CONSULT    Murray Nicholson is a 63 year old male with CHF/CM ejection fraction of 15% and ESRD on dialysis currently using a right IJ double lumen 16 Fr 27 cm catheter which was revised 3/22 after malfunctioning after being placed 1/16/18. The existing catheter continues to malfunction so revision has been requested. The patient's K is over 6 and he last ate at 8:30 AM this morning so we will not use sedation.    He is on our schedule for an attempt at an over-wire exchange of existing right IJ catheter to improve function for dialysis later today. If aspiration is still sluggish after exchange, we will place non-tunneled catheter and revise/replace tunneled at a later time.     Discussed with Dr. Urrutia and Kristi Armas PA-C.    Sebas Paulson PA-C  Interventional Radiology  [phone] (daytime IR pager)
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
## 2018-04-17 NOTE — PLAN OF CARE
Problem: Patient Care Overview  Goal: Plan of Care/Patient Progress Review  OT/6C: Cancel. Pt at dialysis this AM during time of attempt. Attempted in PM, pt at procedure. Will reschedule for tomorrow.

## 2018-04-17 NOTE — PROGRESS NOTES
Focus: Admission   Admitted from: clinic  Via: stretcher   Accompanied by: EMS  Belongings: kept with pt  Admission paperwork: complete  Teaching: Call don't fall, use of console, meal times, visiting hours, orientation to unit, when to call for the RN (angina/sob/dizzyness, etc.), and stressed the importance of safety.   Access: PIV   Telemetry: Placed on pt   Ht./Wt.: complete

## 2018-04-17 NOTE — BRIEF OP NOTE
Interventional Radiology Brief Post Procedure Note    Procedure: Tunneled line exchange    Proceduralist: Callum Urrutia MD    Assistant: Doni Azul MD    Time Out: Prior to the start of the procedure and with procedural staff participation, I verbally confirmed the patient s identity using two indicators, relevant allergies, that the procedure was appropriate and matched the consent or emergent situation, and that the correct equipment/implants were available. Immediately prior to starting the procedure I conducted the Time Out with the procedural staff and re-confirmed the patient s name, procedure, and site/side. (The Joint Commission universal protocol was followed.)  Yes        Sedation: None. Local Anesthestic used    Findings: Exchange for 14.5Fr  28 cm Palindrome.    Estimated Blood Loss: None    Fluoroscopy Time:  minute(s)    SPECIMENS: None    Complications: 1. None     Condition: Stable    Plan: Patient to dialysis. Catheter ready for immediate use.    Comments: See dictated procedure note for full details.    Doni Azul MD

## 2018-04-17 NOTE — PLAN OF CARE
Problem: Patient Care Overview  Goal: Plan of Care/Patient Progress Review  Pt sent to dialysis this morning and port was not accessible, so pt was sent back up to 6C.  Due to K 6.2, pt undergoing shifting protocol.  Was given regular insulin and BG is dropped from 259, 179, 116, 81.  Ordered to start infusion of D10 @ 75mL/hr prior to sending pt to IR to have dialysis port re accessed..

## 2018-04-17 NOTE — PROGRESS NOTES
"CLINICAL NUTRITION SERVICES    Reason for Assessment:  Low-sodium (2 g/day) nutrition education, received consult.    Diet History:  Pt was not in room when attempting to see. Note, pt was seen for outpatient nutrition appointment yesterday for LVAD/Heart Tx workup. Sodium intake was discussed yesterday (4/16/18) with pt by RD as well as potassium in foods/beverages.     Nutrition Diagnosis:  Unable to assess    Nutrition Prescription/Recs:  Continue low-sodium diet and fluid restriction. A low potassium diet restriction can be added as a \"Combination Diet.\"     Interventions:  Nutrition Education  1. Attempted to provide verbal instruction on a heart-healthy diet with emphasis on low-sodium meal planning. Pt was not in room.   2. Provided the following handouts: How to Read Nutrition Labels, Tips for a Low-Sodium Diet, Seasoning Your Foods Without Adding Salt, Managing Fluid Restriction, and Nutrition Care Manual handout depicting amounts of K+ in foods/beverages.      Goals:    Pt will verbalize at least five high sodium foods and the importance of avoiding added salt to foods for cooking or seasoning foods.     Follow-up:   Patient to ask any further nutrition-related questions before discharge.  In addition, pt may request outpatient RD appointment.     Mary Silva, MS, RD, LD, CNSC   6C Pgr: [phone]     "

## 2018-04-17 NOTE — H&P
"                               History & Physical  Service: Cards 2     Murray Nicholson MRN# [id number]   YOB: 1955 Age: 63 year old      Date of Admission:  4/16/2018    Chief Complaint: Dyspnea      History of Present Illness:    64 y/o M with PMH of HFrEF (EF 10%), severe AI and MR, ESRD on HD who went to heart failure clinic for post-hospital f/u and was sent from there due to respiratory distress and for expediated workup for LVAD/heart/kidney transplant.   His dry weight is around 163 lbs, today he is 166lbs.   Patient says he has been taking his meds, not drinking too much water/salt, hasn't missed HD, doesn't have much LE edema, or weight gain.  However, he has been having progressively worsening dyspnea -- he says that he used to have \"attacks\" of tachypnea lasting 30sec-1min only after walking some distance, but now even with short distance he gets these \"attacks\" of tachypnea and that's why he wanted to be admitted.  He is sitting without distress on room air but is asking for oxygen for comfort.     No fever/chills, chest pain, abd pain, n/v/d/c.     ROS: 10 point ROS is negative except per HPI    Past Medical History / PSH / Meds / allergies:  - Reviewed    Social History:  - [place]  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  Former smoker, quit in 1994.  Stopped drinking any alcohol last year, never been a heavy drinker.  Denies any illicits.     Family History:  - Some sort of heart disease in biological father.    Exam:  Temp:  [98.1  F (36.7  C)] 98.1  F (36.7  C)  Pulse:  [114-117] 114  Resp:  [16] 16  BP: ()/(64-75) 92/64  SpO2:  [99 %-100 %] 100 %  No intake or output data in the 24 hours ending 04/16/18 2108    General: Alert, interactive, NAD  HEENT: Atraumatic, normocephalic, sclera anicteric, EOMI, OP clear with MMM  Neck: Supple, no JVD  Resp: CTAB, no wheezes -- on RA  Cardiac: systolic murmur  Abdomen: Soft, nontender, nondistended. Active " BS.    Extremities: No LE edema B/L, good peripheral pulses and capillary refill  Skin: Warm and dry, no jaundice or rash  Neuro and Psych: Alert & grossly oriented, CNS 3-12 grossly intact, moves all extremities equally    Labs/Imaging reviewed in the EMR.  Labs: Cr 8.81, HD patient.  K 5.0, bicarb 18, Ca 9.6phos 6.4, LDL 46, Hb 12.3,     Imaging: CXR without pulm edema    EKG:    Cardiac echo MARIANA 3/15/18:  Interpretation Summary  Evaluation performed under optimal hemodynamic condition, post HD, and with BP  98/68.  Severely (EF <30%) reduced left ventricular function is present. The Ejection  Fraction is estimated at 15-20%.  Global right ventricular function is mildly to moderately reduced.  Moderate under hypovolemic condition and likely severe under usual loading  conditon mitral regurgitation. A single, focused regurgitant jet appears to  originate mostly from the A3 and P3 scallops. Mixed etiology mitral  regurgitation [LV dilation with restriction of the posterior leaflet  (Alex IIIB)]. A secondary chordal structure appears disrupted in the A2  and P2 scallop.  Functionally bicuspid aortic valve with fusion of the left and right cusps  (Alex type III.) Severe holodiastolic aortic insufficiency is present.  The size of the annulus measures 26.8 mm by 2D and 31.5 mm by 3D. The max and  min diameter is 33 and 29.7 mm, respectively. The circumference is 99 mm and  the area is 7.7 cm2. There is little to no calcification in the area of the  annulus and in the LVOT.  Consider TAVR evaluation and mitral valve edge to edge repair.    Cardiac angiogram:  [facility] 3/28/18: Elev R and L sided pressures, Mod Pulm HTN, group 2.     [facility] 2/8/17:   1. Both coronary arteries arise from their respective cusps.  2. Dominance: Right  3. The LM is short and has moderate calcification proximally. It is without angiographic evidence of disease.   4. LAD: Type 3 [LAD supplies the entire apex]. The LAD gives rise to  multiple rather long septal perforators, moderate caliber D1, small D2, and small D3. There is mild 25% diffuse apical LAD disease, but otherwise no significant disease elsewhere.  5. LCX is large in caliber proximally. There is a medium caliber OM1 which bifurcates and a small OM2 distally. OM1 has a 25% proximal stenosis. The LCx continues as small vessel into the AV groove.  6. 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 RCA has diffuse calcification with ~25% diffuse disease in proximal portion. There is a focal 50% stenosis in the mid vessel which was suboptimally imaged. FFR was 0.89 at max hyperemia (as noted below). There is diffuse 25% disease distally. The PL system and PDA are free of significant disease.  7. Ramus: medium caliber vessel which has a 50% proximal stenosis.      Assessment/ Plan:    62 y/o M with PMH of HFrEF (EF 10%), severe AI and MR, ESRD on HD who went to heart failure clinic for post-hospital f/u and was sent from there due to respiratory distress and for expediated workup for LVAD/heart/kidney transplant.     Respiratory distress  - Subacute progression of heart failure, with some underlying anxiety component.   - Oxygen for comfort  - Nephrology consult for HD  - Continue current meds.  - LVAD eval already underway.  - Aortic insufficiency repair with valve replacement and MR repair with mitral valve edge repair consideration is underway, however may worsen already severely reduced EF.     --- CHRONIC ISSUES ---   # Chronic systolic heart failure secondary to bicuspid aortic valve and resultant AI with some CAD/HTN contributing. Stage C  NYHA Class III  ACEi/ARB: yes, Losartan 12.5 mg daily.  - Will hold while inpatient due to lightheadedness  BB: No, deferred secondary to hypotension   Aldosterone antagonist: contraindicated due to renal dysfunction  SCD prophylaxis: decision deferred during medication uptitration  Fluid status:  Hypervolemic  Anticoagulation: None.   Antiplatelet:  ASA 81 mg daily.   Sleep Apnea:  Doesn't tolerate CPAP.   Not using.     # Severe AI and MR  - Aortic insufficiency repair with valve replacement and MR repair with mitral valve edge repair consideration is underway, however may worsen already severely reduced EF.     # ESRD on HD T/Th/Sa  - Via tunneled Eliseo OWUSU Dr. Elfering, 3 hr runs, dry weight 74kg. Nephrology consult placed.         #  Ascending aortic aneurysm:  4.5 x 4.5 cm proximal ascending aortic aneurysm seen on MRI done 2/14. Recent echo done 2/2/18 showed size of 4.2 cm.  Will need routine surveillance.          # Anemia, stable - likely 2/2 ESRD       FEN: cardiac  Prophy: ambulate  Code Status: full code    Disposition Plan   Expected discharge: 2 - 3 days, recommended to prior living arrangement once LVAD workup is done.    To be staffed in the AM.     Jo Ann Haley DO, MS  PGY-2, Internal Medicine Resident  Pager       I have reviewed today's vital signs, notes, medications, labs and imaging.  I have also seen and examined the patient and agree with the findings and plan as outlined above.  Pt is 62 yo male with HFrEF with EF 10%, severe AI, severe MR, ESRD on HD who presents with progressive fatigue, SOB, MEADE,  Who reports that he is interested in heart-kidney transplant.  On exam VSS with lungs clear and S1 and S2 with JVP 16 cm with III/VI diastolic m and III/VI HSM at apex.  Labs as above.  Assessment: Pt with endstage heart failure with severe AI and severe MR will workup for LVAD as backup for valve repair/replacement surgery vs. Dbx home therapy.  Will continue HD.  THe ESRD will be a challenge if pt does receive an LVAD.  Will begin LVAD workup.      Bobby Muse MD, PhD  Professor, Heart Failure and Cardiac Transplantation  [facility]

## 2018-04-17 NOTE — PLAN OF CARE
Problem: Patient Care Overview  Goal: Individualization & Mutuality    Admitted last night from clinic w/ worsening respiratory status, already undergoing LVAD workup. SR/ST 90s-110s overnight, other VSS, sats stable on RA but pt requested 2L O2 for comfort. Tramadol given for back pain. BG 200s. Anuric. K+ 6.2 this am, cards 2 notified. Pt is due for hemodialysis today. Emesis x1 this am. Pt up ad edith. Continue to monitor, notify team w/ changes.

## 2018-04-17 NOTE — NURSING NOTE
Pt admitted through ER. Attempted to get bed for direct admission but patient very short of breath and wanted to get him over to the hospital as soon as possible.   [facility] transport arrived for patient at 1955.

## 2018-04-18 ENCOUNTER — APPOINTMENT (OUTPATIENT)
Dept: CT IMAGING | Facility: CLINIC | Age: 63
DRG: 001 | End: 2018-04-18
Attending: NURSE PRACTITIONER
Payer: COMMERCIAL

## 2018-04-18 LAB
ANION GAP SERPL CALCULATED.3IONS-SCNC: 11 MMOL/L (ref 3–14)
BUN SERPL-MCNC: 29 MG/DL (ref 7–30)
CALCIUM SERPL-MCNC: 9 MG/DL (ref 8.5–10.1)
CHLORIDE SERPL-SCNC: 100 MMOL/L (ref 94–109)
CO2 SERPL-SCNC: 27 MMOL/L (ref 20–32)
CREAT SERPL-MCNC: 5.81 MG/DL (ref 0.66–1.25)
ERYTHROCYTE [DISTWIDTH] IN BLOOD BY AUTOMATED COUNT: 16.1 % (ref 10–15)
GFR SERPL CREATININE-BSD FRML MDRD: 10 ML/MIN/1.7M2
GLUCOSE BLDC GLUCOMTR-MCNC: 122 MG/DL (ref 70–99)
GLUCOSE BLDC GLUCOMTR-MCNC: 173 MG/DL (ref 70–99)
GLUCOSE BLDC GLUCOMTR-MCNC: 194 MG/DL (ref 70–99)
GLUCOSE BLDC GLUCOMTR-MCNC: 214 MG/DL (ref 70–99)
GLUCOSE BLDC GLUCOMTR-MCNC: 299 MG/DL (ref 70–99)
GLUCOSE SERPL-MCNC: 106 MG/DL (ref 70–99)
HCT VFR BLD AUTO: 37.3 % (ref 40–53)
HGB BLD-MCNC: 11.9 G/DL (ref 13.3–17.7)
INTERPRETATION ECG - MUSE: NORMAL
M TB TUBERC IFN-G BLD QL: NEGATIVE
M TB TUBERC IFN-G/MITOGEN IGNF BLD: 0.01 IU/ML
MAGNESIUM SERPL-MCNC: 1.7 MG/DL (ref 1.6–2.3)
MCH RBC QN AUTO: 31.8 PG (ref 26.5–33)
MCHC RBC AUTO-ENTMCNC: 31.9 G/DL (ref 31.5–36.5)
MCV RBC AUTO: 100 FL (ref 78–100)
PLATELET # BLD AUTO: 224 10E9/L (ref 150–450)
POTASSIUM SERPL-SCNC: 4.2 MMOL/L (ref 3.4–5.3)
RBC # BLD AUTO: 3.74 10E12/L (ref 4.4–5.9)
SA1 CELL: NORMAL
SA1 COMMENTS: NORMAL
SA1 HI RISK ABY: NORMAL
SA1 MOD RISK ABY: NORMAL
SA1 TEST METHOD: NORMAL
SA2 CELL: NORMAL
SA2 COMMENTS: NORMAL
SA2 HI RISK ABY UA: NORMAL
SA2 MOD RISK ABY: NORMAL
SA2 TEST METHOD: NORMAL
SODIUM SERPL-SCNC: 138 MMOL/L (ref 133–144)
UNOS CPRA: 0
URATE SERPL-MCNC: 3.1 MG/DL (ref 3.5–7.2)
WBC # BLD AUTO: 4.9 10E9/L (ref 4–11)

## 2018-04-18 PROCEDURE — 25000132 ZZH RX MED GY IP 250 OP 250 PS 637: Mod: GY | Performed by: STUDENT IN AN ORGANIZED HEALTH CARE EDUCATION/TRAINING PROGRAM

## 2018-04-18 PROCEDURE — 25000132 ZZH RX MED GY IP 250 OP 250 PS 637: Mod: GY | Performed by: NURSE PRACTITIONER

## 2018-04-18 PROCEDURE — 21400006 ZZH R&B CCU INTERMEDIATE UMMC

## 2018-04-18 PROCEDURE — A9270 NON-COVERED ITEM OR SERVICE: HCPCS | Mod: GY | Performed by: NURSE PRACTITIONER

## 2018-04-18 PROCEDURE — 36415 COLL VENOUS BLD VENIPUNCTURE: CPT | Performed by: STUDENT IN AN ORGANIZED HEALTH CARE EDUCATION/TRAINING PROGRAM

## 2018-04-18 PROCEDURE — 80048 BASIC METABOLIC PNL TOTAL CA: CPT | Performed by: STUDENT IN AN ORGANIZED HEALTH CARE EDUCATION/TRAINING PROGRAM

## 2018-04-18 PROCEDURE — 25000125 ZZHC RX 250: Performed by: STUDENT IN AN ORGANIZED HEALTH CARE EDUCATION/TRAINING PROGRAM

## 2018-04-18 PROCEDURE — 71250 CT THORAX DX C-: CPT

## 2018-04-18 PROCEDURE — A9270 NON-COVERED ITEM OR SERVICE: HCPCS | Mod: GY | Performed by: STUDENT IN AN ORGANIZED HEALTH CARE EDUCATION/TRAINING PROGRAM

## 2018-04-18 PROCEDURE — 84550 ASSAY OF BLOOD/URIC ACID: CPT | Performed by: STUDENT IN AN ORGANIZED HEALTH CARE EDUCATION/TRAINING PROGRAM

## 2018-04-18 PROCEDURE — 85027 COMPLETE CBC AUTOMATED: CPT | Performed by: STUDENT IN AN ORGANIZED HEALTH CARE EDUCATION/TRAINING PROGRAM

## 2018-04-18 PROCEDURE — 00000146 ZZHCL STATISTIC GLUCOSE BY METER IP

## 2018-04-18 PROCEDURE — 93005 ELECTROCARDIOGRAM TRACING: CPT

## 2018-04-18 PROCEDURE — 99233 SBSQ HOSP IP/OBS HIGH 50: CPT | Performed by: NURSE PRACTITIONER

## 2018-04-18 PROCEDURE — 83735 ASSAY OF MAGNESIUM: CPT | Performed by: STUDENT IN AN ORGANIZED HEALTH CARE EDUCATION/TRAINING PROGRAM

## 2018-04-18 PROCEDURE — 99356 ZZC PROLONGED SERV,INPATIENT,1ST HR: CPT | Performed by: CLINICAL NURSE SPECIALIST

## 2018-04-18 PROCEDURE — 93010 ELECTROCARDIOGRAM REPORT: CPT | Performed by: INTERNAL MEDICINE

## 2018-04-18 PROCEDURE — 99223 1ST HOSP IP/OBS HIGH 75: CPT | Performed by: CLINICAL NURSE SPECIALIST

## 2018-04-18 RX ORDER — DOXYCYCLINE 100 MG/1
100 CAPSULE ORAL EVERY 12 HOURS SCHEDULED
Status: COMPLETED | OUTPATIENT
Start: 2018-04-18 | End: 2018-04-25

## 2018-04-18 RX ORDER — ALBUTEROL SULFATE 0.83 MG/ML
2.5 SOLUTION RESPIRATORY (INHALATION) ONCE
Status: COMPLETED | OUTPATIENT
Start: 2018-04-18 | End: 2018-04-18

## 2018-04-18 RX ADMIN — ALBUTEROL SULFATE 2.5 MG: 2.5 SOLUTION RESPIRATORY (INHALATION) at 21:28

## 2018-04-18 RX ADMIN — CALCIUM ACETATE 667 MG: 667 CAPSULE ORAL at 17:06

## 2018-04-18 RX ADMIN — FLUTICASONE PROPIONATE 2 SPRAY: 50 SPRAY, METERED NASAL at 21:13

## 2018-04-18 RX ADMIN — OMEPRAZOLE 20 MG: 20 CAPSULE, DELAYED RELEASE ORAL at 21:13

## 2018-04-18 RX ADMIN — CALCIUM ACETATE 667 MG: 667 CAPSULE ORAL at 12:45

## 2018-04-18 RX ADMIN — ATORVASTATIN CALCIUM 40 MG: 40 TABLET, FILM COATED ORAL at 21:13

## 2018-04-18 RX ADMIN — ASPIRIN 81 MG CHEWABLE TABLET 81 MG: 81 TABLET CHEWABLE at 21:13

## 2018-04-18 RX ADMIN — INSULIN ASPART 1 UNITS: 100 INJECTION, SOLUTION INTRAVENOUS; SUBCUTANEOUS at 17:06

## 2018-04-18 RX ADMIN — ALLOPURINOL 100 MG: 100 TABLET ORAL at 09:05

## 2018-04-18 RX ADMIN — INSULIN ASPART 2 UNITS: 100 INJECTION, SOLUTION INTRAVENOUS; SUBCUTANEOUS at 12:45

## 2018-04-18 RX ADMIN — CALCIUM ACETATE 667 MG: 667 CAPSULE ORAL at 09:05

## 2018-04-18 RX ADMIN — DOXYCYCLINE HYCLATE 100 MG: 100 CAPSULE ORAL at 19:57

## 2018-04-18 NOTE — PROGRESS NOTES
Pt had an episode of hyperventilation lasting less than 30 seconds per pt report. Stated there were no precipitating factors he could think of. When staff entered room, respirations were shallow and fast. Pt's breathing then normalized and slow down.  VSS. Pt recovered without interventions from staff. MD notified.

## 2018-04-18 NOTE — PROGRESS NOTES
Nephrology Progress Note  04/18/2018         Assessment & Recommendations:   Murray Nicholson is a 63 yr old male with PMH of HTN, DMII, functionally bicuspid aortic valve, CHF/CM (EF 10-15%), ESRD, admitted with worsening dyspnea/SOB.      ESKD: due to DMII, on PD since Aug 2017, changed to iHD 1/2018 due to polymicrobial and fungal peritonitis, now on TTS schedule at [facility] under care of Dr Mcpherson. Access: tunneled RIJ (replaced 4/17/18). Run time: 4 hrs; EDW 74 kg.  - Dialysis per TTS schedule   - Heparin lock CVC     Hyperkalemia, resolved: with dialysis       Access: just had tunneled RIJ replaced on 3/22/18; however wasn't working well so replaced again 4/17/18.          Volume: 74 kg, recently (2/2018) challenged with significant improvement in PCW.  -  recommend fluid and Na restriction  - Daily standing weights please     Severe AV and MR insufficiency:   BP/congestive CM (EF 10-15%); minimal CAD per angio on 2/8/17. Chronically hypotensive with tachycardia; had been on losartan and BB, both stopped and pt unable to tolerate afterload reduction either due to hypotension  - Goal MAP > 65 and SBP > 95 while dialyzing  - On midodrine 10 mg pre-HD  - being evaluated for valve replacements vs transplant possibly with LVAD as BTT.         Anemia: hgb 11.9; Recent labs with hgb in 11's, ferritin 371, IS 12, iron 36; on venofer 50 mg qTuesday, ANASTASIYA recently stopped.  - Continue venofer      BMD: calcium 9.0, alb 3.5, phos 6.4; recent ; on hectorol 0.5 mcg via HD; on phoslo 1 tab TID with meals.   - Continue hectorol via HD  - Continue Phoslo (will monitor phos level, may need to switch to sevelamer and increase dose)    Recommendations were communicated to primary team via this note and phone conversation.       FANTA GarciaC  Division of Kidney Disease  [phone]    Interval History :   Seen bedside, actually feeling much better today, has not had another dyspneic episode since prior to IR  "CVC replacement yesterday. Being evaluated for transplant/LVAD.    Review of Systems:   4 point ROS negative other than as noted above.    Physical Exam:   I/O last 3 completed shifts:  In: 640 [P.O.:640]  Out: 1500 [Other:1500]   BP 92/65  Pulse 114  Temp 98  F (36.7  C) (Oral)  Resp 18  Ht 1.727 m (5' 8\")  Wt 75.3 kg (165 lb 14.4 oz)  SpO2 100%  BMI 25.23 kg/m2     GENERAL APPEARANCE: alert, NAD  EYES: no scleral icterus, pupils equal   Pulmonary: lungs clear to auscultation with equal breath sounds bilaterally   CV: regular rhythm, normal rate    - Edema trace  GI: soft, nontender, normal bowel sounds  MS: no evidence of inflammation in joints, no muscle tenderness  : no doyle  SKIN: no rash, warm, dry, no cyanosis  NEURO: face symmetric, no asterixis   Access: tunneled RIJ       Labs:   All labs reviewed by me  Electrolytes/Renal -   Recent Labs   Lab Test  04/18/18   0642  04/17/18   1441  04/17/18   1018  04/17/18   0559  04/16/18   1546   02/12/18   0656   01/12/18   0742   NA  138   --    --   136  135   < >  136   < >  137   POTASSIUM  4.2  4.8  5.7*  6.2*  5.0   < >  4.5   < >  3.5   CHLORIDE  100   --    --   102  101   < >  103   < >  98   CO2  27   --    --   16*  18*   < >  22   < >  32   BUN  29   --    --   76*  63*   < >  37*   < >  38*   CR  5.81*   --    --   9.72*  8.81*   < >  7.12*   < >  8.27*   GLC  106*   --    --   260*  253*   < >  121*   < >  250*   TRINI  9.0   --    --   9.5  9.6   < >  8.8   < >  8.4*   MAG  1.7   --    --   2.0  1.9   --   1.9   < >  1.7   PHOS   --    --    --    --   6.4*   --   4.1   --   3.3    < > = values in this interval not displayed.       CBC -   Recent Labs   Lab Test  04/18/18   0642  04/16/18   1546  03/07/18   0833   WBC  4.9  5.7  6.9   HGB  11.9*  12.3*  11.1*   PLT  224  275  257       LFTs -   Recent Labs   Lab Test  04/16/18   1546  03/07/18   0833  02/19/18   1558  02/02/18   1736   ALKPHOS  123  129  102  86   BILITOTAL  0.8  0.7  0.6  0.4 "   ALT  22  21  15  16   AST  17  18  18  20   PROTTOTAL  7.4   --   7.2  6.2*   ALBUMIN  3.5   --   2.7*  2.1*       Iron Panel -   Recent Labs   Lab Test  07/19/17   1306  07/05/17   1204  06/21/17   1058   IRON  46  26*  69   IRONSAT  18  12*  29   ALBERTINA  369  542*  557*         Imaging:  Reviewed    Current Medications:    allopurinol  100 mg Oral Daily     aspirin  81 mg Oral Daily     atorvastatin  40 mg Oral Daily     calcium acetate  667 mg Oral TID w/meals     dextrose  25 g Intravenous Once     doxycycline  100 mg Oral Q12H JEAN     fluticasone  1-2 spray Both Nostrils Daily     insulin aspart  1-7 Units Subcutaneous TID AC     insulin aspart  1-5 Units Subcutaneous At Bedtime     omeprazole  20 mg Oral Daily     sodium chloride (PF)  3 mL Intracatheter Q8H       heparin (porcine) 500 Units/hr (04/17/18 1711)     Reason ACE/ARB/ARNI order not selected       Reason beta blocker order not selected       Kristi Armas PA-C

## 2018-04-18 NOTE — PROGRESS NOTES
[facility]   Cardiology II Service / Advanced Heart Failure  Daily Progress Note  Date of Service: 4/18/2018      Patient: Murray Nicholson  MRN: [id number]  Admission Date: 4/16/2018  Hospital Day # 2    Assessment and Plan: Murray Nicholson is a 63 year old male DM Type II, SHEELA, GERD, HTN, Dyslipidemia, Severe AI, Severe MR, ESRD on HD, ICM with EF 15-20%. He presented per [facility] clinic for decompensated heart failure.      ICM. 3/15/18 Echo notes EF 15-20%, mild-moderately reduced RV function, bicuspid AV with severe holodiastolic AI, and torn chordae to A3 P3 MR. RHC 3/29/18 consistent with mRA-9, RV-55/16, mPA-36, mPAW-32, BECKA CO-3, BECKA-1.6. NM Lexiscan 1/12/16 negative for ischemia. LHC 2/8/17 with no obstructive CAD. CPX 3/30/18 consistent with RER 1.23, VEVCO2 slope of 68, with FC of 31%. Secondary to ischemic and valvular etiology.   Stage D, NYHA Class IIIB  ACEi/ARB Losartan held due to dizziness.   BB contraindicated due to low cardiac output  Aldosterone antagonist contraindicated due to renal dysfunction  SCD prophylaxis Defer pending LVAD workup.   Fluid status HD.   Sleep Apnea: CPAP.   - Plan for LVAD pump show and tell with sons and friend Naresh tomorrow.      Current diagnostics for LVAD/transplant workup:  Hep C, Hepatitis B, Transferrin, TSH, HSV Type I, CMV, PSA, Toxoplasmosis, ANDRE, LE US, Carotid US, Chest X-ray, and Hgb A1C negative.   Lipid profile with TC-152, Triglycerides-233, HDL-46, and LDL-60.   HSV Type II > 8, VZ > 5.7 consistent with prior exposure or immunity, and EBV 7.1.   Abd US notes prominent hepatic venous system.   CT Head consistent with right maxillary sinusitis.   Chest CT/Abd/Pevlis 1/7/18 consistent with Patchy consolidative and groundglass nodularity scattered throughout the bilateral lungs suspicious for infection or inflammation, and etiology could include typical or atypical organisms.  PFT's 4/2/18 FEV1 pred-88%, FEV1/FVC pred 75%, and FVC pred 87%.   6  "min walk 840 ft.   Colonoscopy noted 3 polyps and one granulear area with recommendation of repeat in 5 years done on 12/14/11.  NS 4/2/18 consult notes tobacco use, quit 20 years ago, marijuana use, quite 2 years ago, and some concern for social support with no restrictions for transplant/LVAD candidacy.   SW and Palliative consult remain pending.   Repeat CT Chest /Abd/Pelvis pending.      Severe AI and MR.   - Surgical consult pending workup above.      ESRD on HD. Hyperkalemia resolved. K 6.2 4/17 shifted with insulin given issue with tunneled line. S/p IR tunneled line exchange 4/17/18.  - Appreciate consult per Nephrology.   - HD with 1500 UF 4/17/18.   - Midodrine 10 mg po prior to dialysis.     Right maxillary sinusitis. CT Head consistent with right maxillary sinusitis with current complaints of PND.   - 7 day course of Doxycycline 100 mg po BID.      Chronic Medical Conditions:  GERD. Prilosec.   DM Type II. Novolog medium intensity SSI. Glipizide on hold.   AAA. 4.5 x 4.5 cm proximal ascending aortic aneurysm seen on MRI done 2/14. Recent echo 2/2/18 showed size of 4.2 cm.   CAD. BB contraindicated in low output state. ASA 81 mg po daily and Lipitor.      FEN: 2 gram NA diet   PROPHY: Prilosec  LINES: PIV  DISPO:  TBD.   CODE STATUS: Full Code   ================================================================    Interval History/ROS: He is feeling better this AM. He denies fever, chills, chest pain, PND, SOB, cough, nausea, vomiting, diarrhea, and LE edema. He notes improved mobility this AM, but mild MEADE. He is tolerating oral intake well.     Last 24 hr care team notes reviewed.   ROS:  4 point ROS including Respiratory, CV, GI and , other than that noted in the HPI, is negative.     Medications: Reviewed in EPIC.     Physical Exam:   BP 92/65 (BP Location: Left arm)  Pulse 114  Temp 98  F (36.7  C) (Oral)  Resp 18  Ht 1.727 m (5' 8\")  Wt 75.3 kg (165 lb 14.4 oz)  SpO2 100%  BMI 25.23 " kg/m2  GENERAL: Appears alert and oriented times three.   HEENT: Eye symmetrical and free of discharge bilaterally. Mucous membranes moist and without lesions.  NECK: Supple and without lymphadenopathy. JVD 8-10 cm.   CV: Regular, tachycardic. S1S2 present with III/VI murmur heard best at LSB.   RESPIRATORY: Respirations regular, even, and unlabored. Lungs CTA throughout.   GI: Soft and non distended with normoactive bowel sounds present in all quadrants. No tenderness, rebound, guarding. No organomegaly.   EXTREMITIES: No peripheral edema. 2+ bilateral pedal pulses.   NEUROLOGIC: Alert and orientated x 3. CN II-XII grossly intact. No focal deficits.   MUSCULOSKELETAL: No joint swelling or tenderness.   SKIN: No jaundice. No rashes or lesions.     Data:  CMP  Recent Labs  Lab 04/18/18  0642 04/17/18  1441 04/17/18  1018 04/17/18  0559 04/16/18  1546     --   --  136 135   POTASSIUM 4.2 4.8 5.7* 6.2* 5.0   CHLORIDE 100  --   --  102 101   CO2 27  --   --  16* 18*   ANIONGAP 11  --   --  18* 15*   *  --   --  260* 253*   BUN 29  --   --  76* 63*   CR 5.81*  --   --  9.72* 8.81*   GFRESTIMATED 10*  --   --  5* 6*   GFRESTBLACK 12*  --   --  7* 7*   TRINI 9.0  --   --  9.5 9.6   MAG 1.7  --   --  2.0 1.9   PHOS  --   --   --   --  6.4*   PROTTOTAL  --   --   --   --  7.4   ALBUMIN  --   --   --   --  3.5   BILITOTAL  --   --   --   --  0.8   ALKPHOS  --   --   --   --  123   AST  --   --   --   --  17   ALT  --   --   --   --  22     CBC  Recent Labs  Lab 04/18/18  0642 04/16/18  1546   WBC 4.9 5.7   RBC 3.74* 3.74*   HGB 11.9* 12.3*   HCT 37.3* 37.4*    100   MCH 31.8 32.9   MCHC 31.9 32.9   RDW 16.1* 15.7*    275     INR  Recent Labs  Lab 04/16/18  1546   INR 1.09       Patient seen and discussed with Dr. Muse.     Jennifer FISHER  4/18/2018

## 2018-04-18 NOTE — CONSULTS
[facility], [place]    Palliative Care Consultation Note  This note was transcribed using voice recognition software. While I review and edit the transcription, I may miss errors. Please let me know of any serious mis-transcriptions and I will addend this note.    Patient: Murray Nicholson  Date of Admission:  4/16/2018    Requesting provider/team: Jennifer Dean, VERONICA, CNP  Reason for consult: Goals of care  Decisional support  Patient and family support    Recommendations:  -Completed LVAD preparedness planning assessment, please see details below  -Patient did request opportunity to discuss his wishes with his family, will follow up on Friday with further needs    LVAD preparedness plan    Patient s legally designated health care agent: Jasper Nicholson- son, Jordi Stauffer-son    Patient s description of how they make decisions (by themselves, in consultation with certain close loved ones, based on advice from medical professionals, etc):  He really does not have one method of making decisions, he feels like each situation alludes to a difference system of making decisions and that basically things have just evolved and sometimes a little bit more pragmatic.    Patient s personal goals/hopes for receiving the LVAD: When asked this he first discusses his roommate who he had one hospitalization that had an LVAD in think he had issues with clotting and also had some exposure to cat what the device looks like from his roommates which he realized the appliances outside his body where the batteries.  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  He also has enjoyed the more quiet time that he is gotten recently and been able to read a bit more.    Patient s worries/concerns when considering receiving the LVAD: His biggest concern is how he is going to live  with an LVAD and pay for it.  How does the patient envision or anticipate his/her future with the LVAD?  He is not really sure, he wishes to be able to work.  Patient s thoughts about what constitutes a  good  quality of life: As above    Patient s thoughts about what health conditions would be unacceptable (situations the patient would want her/his doctors and loved ones to know that she/he would not want life prolonged)?  He really had never really thought about these situations before, generally have much thoughts about it.  He tells me that he is the person who does not want to think about it can have it consume his thoughts discussed with him the benefits of talking about his wishes with his loved ones.  Leading to decreased stress in the fact that other people know his wishes and encouraged him to start to think about it, as well as discuss with his loved ones.    LVADs carry a risk of stroke which can be impairing. After a stroke, what sort of functional outcomes would be acceptable vs unacceptable to the patient?  See above     Chronic mechanical ventilation via a tracheostomy tube is a risk. What are the circumstances the patient would want her/his physicians and family to keep them alive with long-term mechanical ventilation vs allow them to die?  See above    These recommendations have been discussed with primary team.    Thank you for the opportunity to participate in the care of this patient and family. Our team will  follow-up on Friday with further needs. Please feel free to contact the on-call Palliative provider with any urgent needs.    VERONICA Prince CNS  Palliative Care Consult Team  Pager: [phone]    [facility] Inpatient Team Consult pager [phone] (M-F 8-4:30)  After-hours Answering Service [phone]   Palliative Clinic: [phone]       Assessment:  Murray Nicholson is a 63 year old male with advanced heart failure and end-stage renal disease being considered for advanced  heart  failure therapy.    Symptoms: Reports has been having these spells of hyperventilation, has been told that it is his cardiac disease causing this, has not had one in almost a day and they have seemed to go away.  From what he tells me these would occur initially when he was being more active but most recently also occurred when he was simply laying in bed.  Again seems to be much better after dialysis did not have much fluid removed however, wonder about his symptoms being not only related to his dyspnea but also wonder if there is anxiety component to the phenomenon.    Social:            Support system: Per his report he has 2 sons Jasper and karol   As well as the friend José Miguel.  He is .       Functional status: Has been greatly limited       Occupation: Has been the last 8 years working at [employer], he has in his past has been after and really has not been able to do that much related to his illness    Coping: Had a fairly long discussion with patient today, he got pretty tearful and thinking about his mother who recently  last April and seems to be integrating that into his situation now, during our discussion he alludes to that he really has not thought much about his death has started to think about that a little bit more, he uses an allergy of life used to be in the many losses that he has had his life that have really narrowed down his life to a smaller portion of what it used to be, he wonders to what degree he will be able to widen his abilities and wife.  Seems to be pretty emotional with the recent involvement of sons that are helping him in even the very little sway such as with clearing his driveway from snow.  He has found it meaningful that his sons are showing and weighs and lighten him know that they care about him.    Advance Care Planning:           Decision making capacity: Yes       Disease understanding: Good understanding       Goals of Care: In talking about his thoughts of how he  wants to move forward he really is not quite sure about how he would want to move forward overall he also tells me that he is not really sure if he wants to go through all of this.  He has lots of questions about what the treatment options are able to realistically be able to do and was concerned about the complications that could occur.       Health care directive: yes       Health care agent: Jasper Nicholson- son, Alternate is Jordi Nicholson- son       Code Status:  Full Code       POLST There is no POLST (Physician orders for life-sustaining treatment) form on file for this patient      History of Present Illness   Sources of History:patient and electronic health record    Murray Nicholson is a 63 year old male with a history of heart failure, severe aortic insufficiency and mitral regurgitation, end-stage renal disease on dialysis who was at the heart failure clinic for a post hospital follow-up and was sent to the hospital due to respiratory distress and expedited workup of an LVAD and possibility of future heart/kidney transplant.  His dyspnea has been getting progressively worse and having these attacks of hyperventilation when he was even just walking short distances.     Palliative care was consulted 4-18 for LVAD preparedness planning      ROS:  Palliative Symptom Review (0=no symptom/no concern, 1=mild, 2=moderate, 3=severe):  Pain: 0  Fatigue: 0  Nausea: 0  Constipation: 0  Diarrhea: 0  Depressive Symptoms: 0  Anxiety: 0  Drowsiness: 0  Poor Appetite: 0  Shortness of Breath: 0  Insomnia: 0  Delirium: 0       Past Medical History:   Past Medical History:   Diagnosis Date     (HFpEF) heart failure with preserved ejection fraction (H)      Allergic rhinitis, cause unspecified      Anemia of chronic kidney failure      AS (aortic stenosis)      Ascending aortic aneurysm (H)      Bicuspid aortic valve      CAD (coronary artery disease)      Chronic kidney disease, stage 5 (H)      Congestive heart failure, unspecified       Dyslipidemia      Esophageal reflux      ESRD (end stage renal disease) (H)      Hypersomnia with sleep apnea, unspecified      Hypertension      MGUS (monoclonal gammopathy of unknown significance)      Mitral regurgitation      SHEELA (obstructive sleep apnea)      Systolic heart failure (H)      Type 2 diabetes mellitus (H)           Past Surgical History:   Past Surgical History:   Procedure Laterality Date     LAPAROSCOPIC HERNIORRHAPHY INGUINAL BILATERAL Bilateral 2015    Procedure: LAPAROSCOPIC HERNIORRHAPHY INGUINAL BILATERAL;  Surgeon: Bobby Mcconnell MD;  Location: [facility] OR     LAPAROSCOPIC INSERTION CATHETER PERITONEAL DIALYSIS N/A 2017    Procedure: LAPAROSCOPIC INSERTION CATHETER PERITONEAL DIALYSIS;  Laparoscopic Peritoneal Dialysis Catheter Placement - Anesthesia with block;  Surgeon: Esteban Arvizu MD;  Location: [facility] OR     REMOVE CATHETER PERITONEAL Right 1/15/2018    Procedure: REMOVE CATHETER PERITONEAL;  Open Removal of Peritoneal Dialysis Catheter ;  Surgeon: Esteban Arvizu MD;  Location: [facility] OR             Family History:   Family History   Problem Relation Age of Onset     C.A.D. Father       from-never knew father-age 60     DIABETES Father      CEREBROVASCULAR DISEASE Father      Hypertension No family hx of      Breast Cancer No family hx of      Cancer - colorectal No family hx of      Prostate Cancer No family hx of      KIDNEY DISEASE No family hx of      Family history reviewed       Allergies:   Allergies   Allergen Reactions     Norco [Hydrocodone-Acetaminophen] Nausea and Vomiting     Cats      Throat tightness     Hydralazine Other (See Comments)     Didn't tolerate secondary to hypotension     Isosorbide Other (See Comments)     hypotension     Penicillins Hives     Seasonal Allergies      rhinitis     Shrimp      Throat closes             Medications:   I have reviewed this patient's medication profile and medications given in the past 24 hours.  I have reviewed  the medication list, and no palliative specific medications of note at this time.           Physical Exam:   Vital Signs: Temp: 97.9  F (36.6  C) Temp src: Oral BP: 102/69   Heart Rate: 105 Resp: 16 SpO2: 100 % O2 Device: None (Room air) Oxygen Delivery: 2 LPM  Weight: 165 lbs 14.4 oz    Physical Exam:  Constitutional: Awake, alert, cooperative, no apparent distress  Lungs: No increased work of breathing  Neurologic: Awake, alert, oriented to name, place and time.   Neuropsychiatric: Normal affect, mood, orientation, memory and insight.     Data reviewed:     ROUTINE ICU LABS (Last four results)  CMP  Recent Labs  Lab 04/18/18  0642 04/17/18  1441 04/17/18  1018 04/17/18  0559 04/16/18  1546     --   --  136 135   POTASSIUM 4.2 4.8 5.7* 6.2* 5.0   CHLORIDE 100  --   --  102 101   CO2 27  --   --  16* 18*   ANIONGAP 11  --   --  18* 15*   *  --   --  260* 253*   BUN 29  --   --  76* 63*   CR 5.81*  --   --  9.72* 8.81*   GFRESTIMATED 10*  --   --  5* 6*   GFRESTBLACK 12*  --   --  7* 7*   TRINI 9.0  --   --  9.5 9.6   MAG 1.7  --   --  2.0 1.9   PHOS  --   --   --   --  6.4*   PROTTOTAL  --   --   --   --  7.4   ALBUMIN  --   --   --   --  3.5   BILITOTAL  --   --   --   --  0.8   ALKPHOS  --   --   --   --  123   AST  --   --   --   --  17   ALT  --   --   --   --  22     CBC  Recent Labs  Lab 04/18/18  0642 04/16/18  1546   WBC 4.9 5.7   RBC 3.74* 3.74*   HGB 11.9* 12.3*   HCT 37.3* 37.4*    100   MCH 31.8 32.9   MCHC 31.9 32.9   RDW 16.1* 15.7*    275     INR  Recent Labs  Lab 04/16/18  1546   INR 1.09     Arterial Blood GasNo lab results found in last 7 days.    VERONICA Prince CNS  Palliative Care Consult Team  Pager: [phone]    [facility] Inpatient Team Consult pager [phone] (M-F 8-4:30)  After-hours Answering Service [phone]  Palliative Clinic: [phone]     Total time spent was 110 minutes,  >50% of time was spent counseling and/or coordination of care regarding .  75  min of that were spent face-to-face, in 1315, out 1430.

## 2018-04-18 NOTE — PLAN OF CARE
Problem: Cardiac: Heart Failure (Adult)  Goal: Signs and Symptoms of Listed Potential Problems Will be Absent, Minimized or Managed (Cardiac: Heart Failure)  Signs and symptoms of listed potential problems will be absent, minimized or managed by discharge/transition of care (reference Cardiac: Heart Failure (Adult) CPG).   Outcome: No Change  Alert and oriented x4. Denied pain and nausea. Reported some SOB after activity, recovered at rest.  SpO2 >90% on room air. LS clear. BS active.Up ad edith in room. Had chest/abdominal CT, results pending. Good oral intake. No urinary output reported.  and 194, respectively. VSS. Will continue to monitor pt closely.

## 2018-04-18 NOTE — PROGRESS NOTES
"Pt had episode of  \"hyperventilation\" lasting 30 seconds per pt report. Reported it started when he got up to put something in the trash (approximately 5-6 ft away from him), stated he did not \"prepare\" himself for the activity as he normally does by taking deep breaths before activity. Reported feeling the need to breathe fast, denied dizziness and palpitations. Pt max heart rate 137 during episode. MD aware. Pt reported relief with rest,  without interventions from staff.  "

## 2018-04-18 NOTE — PLAN OF CARE
Problem: Patient Care Overview  Goal: Plan of Care/Patient Progress Review  Outcome: No Change  D: Pt admitted w/ decompensated HF, LVAD and heart/kidney w/u.  ESRD, on HD Tu/Th/Sa.  S/p tunneled line exchange and HD 4/17.  I: PRN tramadol.  PRN albuterol inhaler.  2 LPM NC.  2 L fluid restrict.  A: Pt endorses HA and R shoulder pain, controlled w/ prn tramadol.  VSS, see flowsheet.  ST.  Independent.  Anuric.  Sleeping b/w cares.    P: Continue to monitor and notify MDs as necessary of pertinent pt condition changes.

## 2018-04-18 NOTE — PROGRESS NOTES
Pt reported episode of hyperventilation lasting approximately 30 seconds a t 1630. Pt was at rest when it occurred, unwitnessed by staff. VSS. Pt recovered without interventions. . Will continue to monitor closely.

## 2018-04-18 NOTE — PROGRESS NOTES
Care Coordinator Progress Note    Admission Date/Time:  4/16/2018  Attending MD:  Bobby Muse MD    Data  Chart reviewed, discussed with interdisciplinary team.   Patient with h/o DM Type II, SHEELA, GERD, HTN, Dyslipidemia, Severe AI, Severe MR, ESRD on HD, ICM with EF 15-20%; admittted with respiratory distress, decompensated heart failure.      Concerns with insurance coverage for discharge needs: TBD  Current Living Situation: Patient lives alone.  Support System: Supportive and Involved sons.   Services Involved: [facility]   Transportation at Discharge: TBD  Transportation to Medical Appointments: TBD  Barriers to Discharge: medical condition    Coordination of Care and Referrals: Provided patient/family with options for outpt dialysis with potential LVAD.     Assessment      Per NP, pt is being worked up for LVAD/heart transplant. Pt is currently receiving outpt dialysis at [facility] but will not be able to return there as pt will not have a caregiver to stay with him during his entire dialysis run per [facility] protocol for pt with an LVAD. This writer told pt that [facility] on [address] does dialysis for pts with LVAD's and does not require caregiver to be present. Pt said that he would like a referral made to this [facility] dialysis unit. This writer spoke with Mgr Kristal at [facility] (Ph: [phone]) who said that they could accept a pt with an LVAD on M-W-F at about 11:30 AM but that this time could not yet be confirmed.   Intervention:   Referral made to [facility] (Ph: [phone] Fax: [phone]) for outpt dialysis. Awaiting confirmation.    Plan  Anticipated Discharge Date: TBD  Unable to complete discharge planning at this time.   Anticipated Discharge Plan:  TBD  CC will continue to monitor pt's medical condition and progress toward discharge.   JONATHAN BULLARD RN BSN  Care  Coordinator  [phone]

## 2018-04-18 NOTE — PLAN OF CARE
Problem: Patient Care Overview  Goal: Plan of Care/Patient Progress Review  OT/6C: Pt with alternate provider at time of attempt, will check back later as schedule allows.

## 2018-04-19 ENCOUNTER — CARE COORDINATION (OUTPATIENT)
Dept: NEPHROLOGY | Facility: CLINIC | Age: 63
End: 2018-04-19

## 2018-04-19 ENCOUNTER — APPOINTMENT (OUTPATIENT)
Dept: MRI IMAGING | Facility: CLINIC | Age: 63
DRG: 001 | End: 2018-04-19
Attending: NURSE PRACTITIONER
Payer: COMMERCIAL

## 2018-04-19 ENCOUNTER — APPOINTMENT (OUTPATIENT)
Dept: OCCUPATIONAL THERAPY | Facility: CLINIC | Age: 63
DRG: 001 | End: 2018-04-19
Attending: INTERNAL MEDICINE
Payer: COMMERCIAL

## 2018-04-19 LAB
AMPHETAMINES UR QL SCN: NEGATIVE
ANION GAP SERPL CALCULATED.3IONS-SCNC: 14 MMOL/L (ref 3–14)
BARBITURATES UR QL: NEGATIVE
BENZODIAZ UR QL: NEGATIVE
BUN SERPL-MCNC: 47 MG/DL (ref 7–30)
CALCIUM SERPL-MCNC: 9.2 MG/DL (ref 8.5–10.1)
CANNABINOIDS UR QL SCN: NEGATIVE
CHLORIDE SERPL-SCNC: 99 MMOL/L (ref 94–109)
CO2 SERPL-SCNC: 24 MMOL/L (ref 20–32)
COCAINE UR QL: NEGATIVE
CREAT SERPL-MCNC: 7.95 MG/DL (ref 0.66–1.25)
ERYTHROCYTE [DISTWIDTH] IN BLOOD BY AUTOMATED COUNT: 16.1 % (ref 10–15)
ETHANOL UR QL SCN: NEGATIVE
GFR SERPL CREATININE-BSD FRML MDRD: 7 ML/MIN/1.7M2
GLUCOSE BLDC GLUCOMTR-MCNC: 114 MG/DL (ref 70–99)
GLUCOSE BLDC GLUCOMTR-MCNC: 138 MG/DL (ref 70–99)
GLUCOSE BLDC GLUCOMTR-MCNC: 186 MG/DL (ref 70–99)
GLUCOSE BLDC GLUCOMTR-MCNC: 202 MG/DL (ref 70–99)
GLUCOSE BLDC GLUCOMTR-MCNC: 229 MG/DL (ref 70–99)
GLUCOSE SERPL-MCNC: 145 MG/DL (ref 70–99)
HCT VFR BLD AUTO: 37 % (ref 40–53)
HGB BLD-MCNC: 11.6 G/DL (ref 13.3–17.7)
INTERPRETATION ECG - MUSE: NORMAL
MAGNESIUM SERPL-MCNC: 1.6 MG/DL (ref 1.6–2.3)
MCH RBC QN AUTO: 31.4 PG (ref 26.5–33)
MCHC RBC AUTO-ENTMCNC: 31.4 G/DL (ref 31.5–36.5)
MCV RBC AUTO: 100 FL (ref 78–100)
OPIATES UR QL SCN: NEGATIVE
PCP UR QL SCN: NEGATIVE
PLATELET # BLD AUTO: 199 10E9/L (ref 150–450)
POTASSIUM SERPL-SCNC: 4.5 MMOL/L (ref 3.4–5.3)
RBC # BLD AUTO: 3.69 10E12/L (ref 4.4–5.9)
SODIUM SERPL-SCNC: 137 MMOL/L (ref 133–144)
WBC # BLD AUTO: 4.8 10E9/L (ref 4–11)

## 2018-04-19 PROCEDURE — 40000893 ZZH STATISTIC PT IP EVAL DEFER

## 2018-04-19 PROCEDURE — 97165 OT EVAL LOW COMPLEX 30 MIN: CPT | Mod: GO

## 2018-04-19 PROCEDURE — A9270 NON-COVERED ITEM OR SERVICE: HCPCS | Mod: GY | Performed by: NURSE PRACTITIONER

## 2018-04-19 PROCEDURE — 80048 BASIC METABOLIC PNL TOTAL CA: CPT | Performed by: STUDENT IN AN ORGANIZED HEALTH CARE EDUCATION/TRAINING PROGRAM

## 2018-04-19 PROCEDURE — 25000132 ZZH RX MED GY IP 250 OP 250 PS 637: Mod: GY | Performed by: NURSE PRACTITIONER

## 2018-04-19 PROCEDURE — 90937 HEMODIALYSIS REPEATED EVAL: CPT

## 2018-04-19 PROCEDURE — 80320 DRUG SCREEN QUANTALCOHOLS: CPT | Performed by: NURSE PRACTITIONER

## 2018-04-19 PROCEDURE — 80307 DRUG TEST PRSMV CHEM ANLYZR: CPT | Performed by: NURSE PRACTITIONER

## 2018-04-19 PROCEDURE — 85027 COMPLETE CBC AUTOMATED: CPT | Performed by: STUDENT IN AN ORGANIZED HEALTH CARE EDUCATION/TRAINING PROGRAM

## 2018-04-19 PROCEDURE — 25000125 ZZHC RX 250: Performed by: NURSE PRACTITIONER

## 2018-04-19 PROCEDURE — 99233 SBSQ HOSP IP/OBS HIGH 50: CPT | Performed by: NURSE PRACTITIONER

## 2018-04-19 PROCEDURE — 25000128 H RX IP 250 OP 636: Performed by: INTERNAL MEDICINE

## 2018-04-19 PROCEDURE — 97110 THERAPEUTIC EXERCISES: CPT | Mod: GO

## 2018-04-19 PROCEDURE — A9270 NON-COVERED ITEM OR SERVICE: HCPCS | Mod: GY | Performed by: STUDENT IN AN ORGANIZED HEALTH CARE EDUCATION/TRAINING PROGRAM

## 2018-04-19 PROCEDURE — A9270 NON-COVERED ITEM OR SERVICE: HCPCS | Mod: GY | Performed by: INTERNAL MEDICINE

## 2018-04-19 PROCEDURE — 21400006 ZZH R&B CCU INTERMEDIATE UMMC

## 2018-04-19 PROCEDURE — 40000275 ZZH STATISTIC RCP TIME EA 10 MIN

## 2018-04-19 PROCEDURE — 83735 ASSAY OF MAGNESIUM: CPT | Performed by: STUDENT IN AN ORGANIZED HEALTH CARE EDUCATION/TRAINING PROGRAM

## 2018-04-19 PROCEDURE — 97530 THERAPEUTIC ACTIVITIES: CPT | Mod: GO

## 2018-04-19 PROCEDURE — 00000146 ZZHCL STATISTIC GLUCOSE BY METER IP

## 2018-04-19 PROCEDURE — 25000131 ZZH RX MED GY IP 250 OP 636 PS 637: Performed by: NURSE PRACTITIONER

## 2018-04-19 PROCEDURE — 40000133 ZZH STATISTIC OT WARD VISIT

## 2018-04-19 PROCEDURE — 94640 AIRWAY INHALATION TREATMENT: CPT

## 2018-04-19 PROCEDURE — 25000132 ZZH RX MED GY IP 250 OP 250 PS 637: Mod: GY | Performed by: INTERNAL MEDICINE

## 2018-04-19 PROCEDURE — 36415 COLL VENOUS BLD VENIPUNCTURE: CPT | Performed by: STUDENT IN AN ORGANIZED HEALTH CARE EDUCATION/TRAINING PROGRAM

## 2018-04-19 PROCEDURE — 74181 MRI ABDOMEN W/O CONTRAST: CPT | Mod: 52

## 2018-04-19 PROCEDURE — 25000132 ZZH RX MED GY IP 250 OP 250 PS 637: Mod: GY | Performed by: STUDENT IN AN ORGANIZED HEALTH CARE EDUCATION/TRAINING PROGRAM

## 2018-04-19 RX ORDER — HEPARIN SODIUM 1000 [USP'U]/ML
1000 INJECTION, SOLUTION INTRAVENOUS; SUBCUTANEOUS
Status: COMPLETED | OUTPATIENT
Start: 2018-04-19 | End: 2018-04-19

## 2018-04-19 RX ORDER — ALBUTEROL SULFATE 0.83 MG/ML
2.5 SOLUTION RESPIRATORY (INHALATION) EVERY 6 HOURS PRN
Status: DISCONTINUED | OUTPATIENT
Start: 2018-04-19 | End: 2018-06-15

## 2018-04-19 RX ORDER — HEPARIN SODIUM 1000 [USP'U]/ML
1000 INJECTION, SOLUTION INTRAVENOUS; SUBCUTANEOUS CONTINUOUS
Status: DISCONTINUED | OUTPATIENT
Start: 2018-04-19 | End: 2018-04-19

## 2018-04-19 RX ORDER — DOXERCALCIFEROL 4 UG/2ML
0.5 INJECTION INTRAVENOUS
Status: COMPLETED | OUTPATIENT
Start: 2018-04-19 | End: 2018-04-19

## 2018-04-19 RX ADMIN — SODIUM CHLORIDE 300 ML: 9 INJECTION, SOLUTION INTRAVENOUS at 08:03

## 2018-04-19 RX ADMIN — DOXYCYCLINE HYCLATE 100 MG: 100 CAPSULE ORAL at 20:02

## 2018-04-19 RX ADMIN — INSULIN ASPART 2 UNITS: 100 INJECTION, SOLUTION INTRAVENOUS; SUBCUTANEOUS at 13:36

## 2018-04-19 RX ADMIN — HEPARIN SODIUM 1000 UNITS: 1000 INJECTION, SOLUTION INTRAVENOUS; SUBCUTANEOUS at 08:04

## 2018-04-19 RX ADMIN — CALCIUM ACETATE 667 MG: 667 CAPSULE ORAL at 07:34

## 2018-04-19 RX ADMIN — ALBUTEROL SULFATE 2.5 MG: 2.5 SOLUTION RESPIRATORY (INHALATION) at 22:03

## 2018-04-19 RX ADMIN — ASPIRIN 81 MG CHEWABLE TABLET 81 MG: 81 TABLET CHEWABLE at 21:30

## 2018-04-19 RX ADMIN — DOXYCYCLINE HYCLATE 100 MG: 100 CAPSULE ORAL at 07:34

## 2018-04-19 RX ADMIN — HEPARIN SODIUM 1000 UNITS/HR: 1000 INJECTION, SOLUTION INTRAVENOUS; SUBCUTANEOUS at 08:04

## 2018-04-19 RX ADMIN — MIDODRINE HYDROCHLORIDE 10 MG: 5 TABLET ORAL at 07:34

## 2018-04-19 RX ADMIN — CALCIUM ACETATE 667 MG: 667 CAPSULE ORAL at 18:03

## 2018-04-19 RX ADMIN — OMEPRAZOLE 20 MG: 20 CAPSULE, DELAYED RELEASE ORAL at 21:30

## 2018-04-19 RX ADMIN — SODIUM CHLORIDE 250 ML: 9 INJECTION, SOLUTION INTRAVENOUS at 08:03

## 2018-04-19 RX ADMIN — DOXERCALCIFEROL 0.5 MCG: 4 INJECTION, SOLUTION INTRAVENOUS at 09:29

## 2018-04-19 RX ADMIN — ALLOPURINOL 100 MG: 100 TABLET ORAL at 07:34

## 2018-04-19 RX ADMIN — FLUTICASONE PROPIONATE 2 SPRAY: 50 SPRAY, METERED NASAL at 21:30

## 2018-04-19 RX ADMIN — INSULIN ASPART 4 UNITS: 100 INJECTION, SOLUTION INTRAVENOUS; SUBCUTANEOUS at 18:07

## 2018-04-19 RX ADMIN — ATORVASTATIN CALCIUM 40 MG: 40 TABLET, FILM COATED ORAL at 21:30

## 2018-04-19 ASSESSMENT — ACTIVITIES OF DAILY LIVING (ADL): PREVIOUS_RESPONSIBILITIES: MEAL PREP;MEDICATION MANAGEMENT;DRIVING

## 2018-04-19 NOTE — PROGRESS NOTES
Nephrology Progress Note  04/19/2018         Assessment & Recommendations:   Murray Nicholson is a 63 yr old male with PMH of HTN, DMII, functionally bicuspid aortic valve, CHF/CM (EF 10-15%), ESRD, admitted with worsening dyspnea/SOB.      ESKD: due to DMII, on PD since Aug 2017, changed to iHD 1/2018 due to polymicrobial and fungal peritonitis, now on TTS schedule at [facility] under care of Dr Mcpherson. Access: tunneled RIJ (replaced 4/17/18). Run time: 4 hrs; EDW 74 kg.  - Dialysis per TTS schedule   - Heparin lock CVC     Access: just had tunneled RIJ replaced on 3/22/18; however wasn't working well so replaced again 4/17/18.          Volume: 74 kg, recently (2/2018) challenged with significant improvement in PCW.  -  recommend fluid and Na restriction  - Daily standing weights please     Severe AV and MR insufficiency:   BP/congestive CM (EF 10-15%); minimal CAD per angio on 2/8/17. Chronically hypotensive with tachycardia; had been on losartan and BB, both stopped and pt unable to tolerate afterload reduction either due to hypotension  - Goal MAP > 65 and SBP > 95 while dialyzing  - On midodrine 10 mg pre-HD  - being evaluated for valve replacements vs transplant possibly with LVAD as BTT.         Anemia: hgb 11.6; Recent labs with hgb in 11's, ferritin 371, IS 12, iron 36; on venofer 50 mg qTuesday, ANASTASIYA recently stopped.  - Continue venofer      BMD: calcium 9.2, alb 3.5, phos 6.4; recent ; on hectorol 0.5 mcg via HD; on phoslo 1 tab TID with meals.   - Continue hectorol via HD  - Continue Phoslo (will monitor phos level, may need to switch to sevelamer and increase dose)    Recommendations were communicated to primary team via this note and in person.       FANTA GarciaC  Division of Kidney Disease  [phone]    Interval History :   Seen on dialysis, stable run to dry weight; committee meeting tomorrow re transplant with possible LVAD vs inotrope. Patient has had a few more of his  "breathing spells, albuterol is helping. Denies n/v, CP, chills.    Review of Systems:   4 point ROS negative other than as noted above.    Physical Exam:   I/O last 3 completed shifts:  In: 700 [P.O.:700]  Out: 0    /70 (BP Location: Left arm)  Pulse 106  Temp 98  F (36.7  C) (Oral)  Resp 16  Ht 1.727 m (5' 8\")  Wt 76 kg (167 lb 9.6 oz)  SpO2 100%  BMI 25.48 kg/m2     GENERAL APPEARANCE: alert, NAD  EYES: no scleral icterus, pupils equal   Pulmonary: lungs clear to auscultation with equal breath sounds bilaterally   CV: regular rhythm, normal rate    - Edema trace  GI: soft, nontender, normal bowel sounds  MS: no evidence of inflammation in joints, no muscle tenderness  : no doyle  SKIN: no rash, warm, dry, no cyanosis  NEURO: face symmetric, no asterixis   Access: tunneled RIJ       Labs:   All labs reviewed by me  Electrolytes/Renal -   Recent Labs   Lab Test  04/19/18   0630  04/18/18   0642  04/17/18   1441   04/17/18   0559  04/16/18   1546   02/12/18   0656   01/12/18   0742   NA  137  138   --    --   136  135   < >  136   < >  137   POTASSIUM  4.5  4.2  4.8   < >  6.2*  5.0   < >  4.5   < >  3.5   CHLORIDE  99  100   --    --   102  101   < >  103   < >  98   CO2  24  27   --    --   16*  18*   < >  22   < >  32   BUN  47*  29   --    --   76*  63*   < >  37*   < >  38*   CR  7.95*  5.81*   --    --   9.72*  8.81*   < >  7.12*   < >  8.27*   GLC  145*  106*   --    --   260*  253*   < >  121*   < >  250*   TRINI  9.2  9.0   --    --   9.5  9.6   < >  8.8   < >  8.4*   MAG  1.6  1.7   --    --   2.0  1.9   --   1.9   < >  1.7   PHOS   --    --    --    --    --   6.4*   --   4.1   --   3.3    < > = values in this interval not displayed.       CBC -   Recent Labs   Lab Test  04/19/18   0630  04/18/18   0642  04/16/18   1546   WBC  4.8  4.9  5.7   HGB  11.6*  11.9*  12.3*   PLT  199  224  275       LFTs -   Recent Labs   Lab Test  04/16/18   1546  03/07/18   0833  02/19/18   1558  02/02/18   1736 "   ALKPHOS  123  129  102  86   BILITOTAL  0.8  0.7  0.6  0.4   ALT  22  21  15  16   AST  17  18  18  20   PROTTOTAL  7.4   --   7.2  6.2*   ALBUMIN  3.5   --   2.7*  2.1*       Iron Panel -   Recent Labs   Lab Test  07/19/17   1306  07/05/17   1204  06/21/17   1058   IRON  46  26*  69   IRONSAT  18  12*  29   ALBERTINA  369  542*  557*         Imaging:  Reviewed    Current Medications:    allopurinol  100 mg Oral Daily     aspirin  81 mg Oral Daily     atorvastatin  40 mg Oral Daily     calcium acetate  667 mg Oral TID w/meals     dextrose  25 g Intravenous Once     doxycycline  100 mg Oral Q12H JEAN     fluticasone  1-2 spray Both Nostrils Daily     gelatin absorbable  1 each Topical During Hemodialysis (from stock)     heparin  3 mL Intracatheter During Hemodialysis (from stock)     heparin  3 mL Intracatheter During Hemodialysis (from stock)     insulin aspart  1-7 Units Subcutaneous TID AC     insulin aspart  1-5 Units Subcutaneous At Bedtime     omeprazole  20 mg Oral Daily     sodium chloride (PF)  3 mL Intracatheter Q8H     sodium chloride (PF)  3 mL Intracatheter During Hemodialysis (from stock)     sodium chloride (PF)  3 mL Intracatheter During Hemodialysis (from stock)       heparin (porcine) 500 Units/hr (04/17/18 1711)     heparin (porcine) 1,000 Units/hr (04/19/18 0804)     Reason ACE/ARB/ARNI order not selected       Reason beta blocker order not selected       Kristi Armas PA-C

## 2018-04-19 NOTE — PROGRESS NOTES
"Met with patient, friend and two sons to present the Heart Mate II, Heart Mate III and Heartware VAD(s) as possible treatment option.     Discussed patient and caregiver responsibilities before and after VAD implant.  Clarified the need for a caregiver to be present for education and to assist patient for at least first 30 days after a patient returns home. Patient's designated caregivers are his two sons and friend.     Discussed basic overview of VAD equipment, on going management, need for anticoagulation, regular dressing change, grounded three-pronged outlet and functioning telephone. Also discussed frequency of follow-up clinic appointments and the need to stay locally for at least 2-4 weeks.  Reviewed restrictions of having an VAD such as no swimming, bathing, boats, MRI's, or arc welding.     Provided and discussed the VAD educational brochures, information regarding the VAD and transplant support groups, information on INTERMACs registry, and \"VAD What You Should Know\" with additional details. Patient and sons and friend signed \"VAD What You Should Know\" document. VAD coordinator contact information provided.  Encouraged patient, sons and friend to call with questions.  strength: 43.8  "

## 2018-04-19 NOTE — PROGRESS NOTES
[facility]   Cardiology II Service / Advanced Heart Failure  Daily Progress Note  Date of Service: 4/19/2018      Patient: Murray Nicholson  MRN: [id number]  Admission Date: 4/16/2018  Hospital Day # 3    Assessment and Plan: Murray Nicholson is a 63 year old male DM Type II, SHEELA, GERD, HTN, Dyslipidemia, Severe AI, Severe MR, ESRD on HD, ICM with EF 15-20%. He presented per [facility] for decompensated heart failure.       ICM. 3/15/18 Echo notes EF 15-20%, mild-moderately reduced RV function, bicuspid AV with severe holodiastolic AI, and torn chordae to A3 P3 MR. RHC 3/29/18 consistent with mRA-9, RV-55/16, mPA-36, mPAW-32, BECKA CO-3, BECKA-1.6. NM Lexiscan 1/12/16 negative for ischemia. LHC 2/8/17 with no obstructive CAD. CPX 3/30/18 consistent with RER 1.23, VEVCO2 slope of 68, with FC of 31%. Secondary to ischemic and valvular etiology.   Stage D, NYHA Class IIIB  ACEi/ARB Losartan held due to dizziness.   BB contraindicated due to low cardiac output  Aldosterone antagonist contraindicated due to renal dysfunction  SCD prophylaxis Defer pending LVAD workup.   Fluid status HD today.   Sleep Apnea: CPAP.   - Plan for LVAD pump show and tell with sons and friend Naresh today.       Current diagnostics for LVAD/transplant workup:  Hep C, Hepatitis B, Transferrin, TSH, HSV Type I, CMV, PSA, Toxoplasmosis, ANDRE, LE US, Carotid US, Chest X-ray, and Hgb A1C negative.   Lipid profile with TC-152, Triglycerides-233, HDL-46, and LDL-60.   HSV Type II > 8, VZ > 5.7 consistent with prior exposure or immunity, and EBV 7.1.   Abd US notes prominent hepatic venous system.   CT Head consistent with right maxillary sinusitis.   Chest CT/Abd/Pevlis 1/7/18 consistent with Patchy consolidative and groundglass nodularity scattered throughout the bilateral lungs suspicious for infection or inflammation, and etiology could include typical or atypical organisms.  PFT's 4/2/18 FEV1 pred-88%, FEV1/FVC pred 75%, and FVC pred  87%.   6 min walk 840 ft.   Colonoscopy noted 3 polyps and one granulear area with recommendation of repeat in 5 years done on 12/14/11, defer repeat given low output state.  NS 4/2/18 consult notes tobacco use, quit 20 years ago, marijuana use, quite 2 years ago, and some concern for social support with no restrictions for transplant/LVAD candidacy.   CT Chest/Abd/Pelvis consistent with mild residual basilar ground glass opacities and 9 mm pancreatic cyst. Pulmonary consult appreciated.     Pancreatic Cyst. 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   - Appreciate GI input.   - Abdominal MRI pending.     Severe AI and MR.   - Appreciate verbal Surgical consult.       ESRD on HD. Hyperkalemia resolved. K 6.2 4/17 shifted with insulin given issue with tunneled line. S/p IR tunneled line exchange 4/17/18.  - Appreciate consult per Nephrology.   - HD with removal of 2200 ml today.    - Midodrine 10 mg po prior to dialysis.      Right maxillary sinusitis. CT Head consistent with right maxillary sinusitis with current complaints of PND.   - Day #2/7 of Doxycycline 100 mg po BID.       Chronic Medical Conditions:  GERD. Prilosec.   DM Type II. Novolog increased to high intensity SSI. Glipizide on hold.   AAA. 4.5 x 4.5 cm proximal ascending aortic aneurysm seen on MRI done 2/14. Recent echo 2/2/18 showed size of 4.2 cm.   CAD. BB contraindicated in low output state. ASA 81 mg po daily and Lipitor.       FEN: 2 gram NA diet   PROPHY: Prilosec  LINES: PIV  DISPO:  TBD.   CODE STATUS: Full Code   ================================================================    Interval History/ROS: He notes isolated episode of SOB today during dialysis. He denies fever, chills, chest pain, palpitations, cough, nausea, vomiting, diarrhea, and LE edema. He is tolerating oral intake well. He is tolerating limited activity at this time due to MEDAE.     Last 24 hr care team notes reviewed.   ROS:  4 point  "ROS including Respiratory, CV, GI and , other than that noted in the HPI, is negative.     Medications: Reviewed in EPIC.     Physical Exam:   /70 (BP Location: Left arm)  Pulse 106  Temp 98  F (36.7  C) (Oral)  Resp 16  Ht 1.727 m (5' 8\")  Wt 76 kg (167 lb 9.6 oz)  SpO2 100%  BMI 25.48 kg/m2  GENERAL: Appears alert and oriented times three.   HEENT: Eye symmetrical and free of discharge bilaterally. Mucous membranes moist and without lesions.  NECK: Supple and without lymphadenopathy. JVD 10 cm.  CV: Regular, tachycardic. S1S2 present with III/VI murmur heard best at LSB.   RESPIRATORY: Respirations regular, even, and unlabored. Lungs CTA throughout.   GI: Soft and non distended with normoactive bowel sounds present in all quadrants. No tenderness, rebound, guarding. No organomegaly.   EXTREMITIES: No peripheral edema with cool extremities. 2+ bilateral pedal pulses.   NEUROLOGIC: Alert and orientated x 3. CN II-XII grossly intact. No focal deficits.   MUSCULOSKELETAL: No joint swelling or tenderness.   SKIN: No jaundice. No rashes or lesions.     Data:  CMP  Recent Labs  Lab 04/19/18  0630 04/18/18  0642 04/17/18  1441 04/17/18  1018 04/17/18  0559 04/16/18  1546    138  --   --  136 135   POTASSIUM 4.5 4.2 4.8 5.7* 6.2* 5.0   CHLORIDE 99 100  --   --  102 101   CO2 24 27  --   --  16* 18*   ANIONGAP 14 11  --   --  18* 15*   * 106*  --   --  260* 253*   BUN 47* 29  --   --  76* 63*   CR 7.95* 5.81*  --   --  9.72* 8.81*   GFRESTIMATED 7* 10*  --   --  5* 6*   GFRESTBLACK 8* 12*  --   --  7* 7*   TRINI 9.2 9.0  --   --  9.5 9.6   MAG 1.6 1.7  --   --  2.0 1.9   PHOS  --   --   --   --   --  6.4*   PROTTOTAL  --   --   --   --   --  7.4   ALBUMIN  --   --   --   --   --  3.5   BILITOTAL  --   --   --   --   --  0.8   ALKPHOS  --   --   --   --   --  123   AST  --   --   --   --   --  17   ALT  --   --   --   --   --  22     CBC  Recent Labs  Lab 04/19/18  0630 04/18/18  0642 04/16/18  1546 "   WBC 4.8 4.9 5.7   RBC 3.69* 3.74* 3.74*   HGB 11.6* 11.9* 12.3*   HCT 37.0* 37.3* 37.4*    100 100   MCH 31.4 31.8 32.9   MCHC 31.4* 31.9 32.9   RDW 16.1* 16.1* 15.7*    224 275     INR  Recent Labs  Lab 04/16/18  1546   INR 1.09       Patient seen and discussed with Dr. Muse.     Jennifer Dean FNP  4/19/2018

## 2018-04-19 NOTE — PLAN OF CARE
Problem: Patient Care Overview  Goal: Plan of Care/Patient Progress Review  PT / 6C - PT orders received and acknowledged. Per discussion with OT - patient will only require one therapy discipline 2/2 current functional status. OT will continue to follow patient for cardiac rehab - PT to defer evaluation at this time and complete orders.

## 2018-04-19 NOTE — PLAN OF CARE
Problem: Patient Care Overview  Goal: Plan of Care/Patient Progress Review  Discharge Planner OT   Patient plan for discharge: Not stated  Current status: Patient seen for OT evaluation and treatment. OT providing therapeutic listening for patient as he is anxious regarding exercise and high levels of activity due to hyperventilating episodes. OT providing increased education on role and treatment surrounding exercise for aerobic activity and other goals oriented towards increased IND in ADLs/IADLs. Patient ambulates ~110 ft, session limited by transport arrival and patient anxious about having episode with upcoming imaging.  Barriers to return to prior living situation: Decreased endurance, anxiety  Recommendations for discharge: Home with assist, continued OP CR phase II  Rationale for recommendations: Patient states he was previously going to OP CR phase II prior to admit. Patient lives alone but has sons to assist with IADLs PRN.       Entered by: Yesy Sexton 04/19/2018 1:44 PM

## 2018-04-19 NOTE — CONSULTS
PULMONARY CONSULT  Date of service: 2018    Patient: Murray Nicholson      : 1955      MRN: [id number]    We were consulted by Cardiology for evaluation of GGO in a patient undergoing an LVAD work-up.        Impressions/Recommendations:   63 year old former smoker, with PMHx most significant for dilated CM w/ systolic HF, CAD, HTN, MGUS, T2DM, severe AI/MR, & ESRD on HD, who was admitted to [facility] from [facility] on 2018 with decompensated HF. He is being seen for evaluation of improving bibasilar ground glass opacities as part of LVAD work-up.    Patient's chest imaging has improved significantly since his influenza illness in 2018. The faint diffuse bibasilar ground glass opacities have improved as well. Patient's clinical presentation is not consistent w/ an infectious process, and it is possible that these GGO may be related to some residual pulmonary edema.    Patient seen & discussed w/  Dr. Max M.D., who is in agreement.     Lavern Suarez MD  Pulmonary & Critical Care FL3  [phone]          History of Present Illness:   Murray Nicholson is a 63 year old former smoker w/ h/o dilated CM w/ systolic HF, CAD, HTN, MGUS, T2DM, severe AI/MR, & ESRD on HD, who was admitted to [facility] from [facility] on 2018 with decompensated HF. He has improved w/ HD, & is currently being worked up for possible LVAD placement. Work-up imaging revealed patchy bibasilar GGO that is improved from most recent CT chest in 2018 in the context of an influenza respiratory infection.     Patient reports that he had a pneumonia at the age of 16, and then in 2018 in the context of an influenza infection. He denies any childhood respiratory issues. He has daily persistent dyspnea, now even at rest, related to his decompensated HF. He describes episodes of sudden tachypnea w/ very deep respirations that are self-limited; they seem to be helped intermittently w/ albuterol. He denies any fevers or chills. He  intermittently has some night sweats.     Patient is a former smoker w/ 19 pack-yr history. He lives in an apartment, does not keep birds. No issues w/ standing water or mold at his residence.           Review of Symptoms:   10-point ROS reviewed, & found negative w/ exceptions noted in the HPI.          Past Medical History:     Past Medical History:   Diagnosis Date     (HFpEF) heart failure with preserved ejection fraction (H)      Allergic rhinitis, cause unspecified      Anemia of chronic kidney failure      AS (aortic stenosis)      Ascending aortic aneurysm (H)      Bicuspid aortic valve      CAD (coronary artery disease)      Chronic kidney disease, stage 5 (H)      Congestive heart failure, unspecified      Dyslipidemia      Esophageal reflux      ESRD (end stage renal disease) (H)      Hypersomnia with sleep apnea, unspecified      Hypertension      MGUS (monoclonal gammopathy of unknown significance)      Mitral regurgitation      SHEELA (obstructive sleep apnea)      Systolic heart failure (H)      Type 2 diabetes mellitus (H)        Past Surgical History:   Procedure Laterality Date     LAPAROSCOPIC HERNIORRHAPHY INGUINAL BILATERAL Bilateral 7/24/2015    Procedure: LAPAROSCOPIC HERNIORRHAPHY INGUINAL BILATERAL;  Surgeon: Bobby Mcconnell MD;  Location: [facility] OR     LAPAROSCOPIC INSERTION CATHETER PERITONEAL DIALYSIS N/A 6/22/2017    Procedure: LAPAROSCOPIC INSERTION CATHETER PERITONEAL DIALYSIS;  Laparoscopic Peritoneal Dialysis Catheter Placement - Anesthesia with block;  Surgeon: Esteban Arvizu MD;  Location: [facility] OR     REMOVE CATHETER PERITONEAL Right 1/15/2018    Procedure: REMOVE CATHETER PERITONEAL;  Open Removal of Peritoneal Dialysis Catheter ;  Surgeon: Esteban Arvizu MD;  Location: [facility] OR            Allergies:     Allergies   Allergen Reactions     Norco [Hydrocodone-Acetaminophen] Nausea and Vomiting     Cats      Throat tightness     Hydralazine Other (See Comments)     Didn't tolerate  secondary to hypotension     Isosorbide Other (See Comments)     hypotension     Penicillins Hives     Seasonal Allergies      rhinitis     Shrimp      Throat closes              Outpatient Medications:       No current facility-administered medications on file prior to encounter.   Current Outpatient Prescriptions on File Prior to Encounter:  albuterol (PROAIR HFA/PROVENTIL HFA/VENTOLIN HFA) 108 (90 BASE) MCG/ACT Inhaler Inhale 2 puffs into the lungs every 6 hours as needed for shortness of breath / dyspnea or wheezing   allopurinol (ZYLOPRIM) 300 MG tablet Take 1 tablet (300 mg) by mouth daily   ASPIRIN 81 MG OR TABS Take 1 tablet (81 mg) by mouth at bedtime   atorvastatin (LIPITOR) 40 MG tablet Take 1 tablet (40 mg) by mouth daily   B Complex-Biotin-FA (B-COMPLEX PO) Take 1 tablet by mouth daily   bismuth subsalicylate (PEPTO BISMOL) 262 MG/15ML suspension Take 15 mLs by mouth every 6 hours as needed for indigestion   blood glucose monitoring (ACCU-CHEK FASTCLIX) lancets Use to test blood sugar 2-3 times daily or as directed.  Ok to substitute alternative if insurance prefers.   blood glucose monitoring (NO BRAND SPECIFIED) test strip Use to test blood sugar 2-3 times daily or as directed.   calcium acetate, Phos Binder, 667 MG TABS Take 1 tablet by mouth 3 times daily (with meals)   fluticasone (FLONASE) 50 MCG/ACT spray Spray 1-2 sprays into both nostrils daily   glipiZIDE (GLUCOTROL) 5 MG tablet Take 1 tablet (5 mg) by mouth daily (with breakfast)   loratadine (CLARITIN) 10 MG tablet Take 10 mg by mouth daily as needed Reported on 5/3/2017   midodrine HCl 10 MG TABS Take 1 tablet by mouth three times a week   omeprazole (PRILOSEC) 20 MG CR capsule Take 20 mg by mouth daily At night   order for DME Equipment being ordered: [employer] ()Treatment Diagnosis: ESRD on PDPt has to be connected to PD all night and can not be disconnected, hence impending his mobility to go to the bathroom. At risk for infection if  "he does not have this equipment. (Patient not taking: Reported on 2018)   traMADol (ULTRAM) 50 MG tablet Take 1 tablet (50 mg) by mouth every 6 hours as needed for moderate pain             Family History:     Family History   Problem Relation Age of Onset     C.A.D. Father       from-never knew father-age 60     DIABETES Father      CEREBROVASCULAR DISEASE Father      Hypertension No family hx of      Breast Cancer No family hx of      Cancer - colorectal No family hx of      Prostate Cancer No family hx of      KIDNEY DISEASE No family hx of              Social History:     Social History   Substance Use Topics     Smoking status: Former Smoker     Packs/day: 1.00     Years: 19.00     Types: Cigarettes     Quit date: 1994     Smokeless tobacco: Never Used     Alcohol use No             Physical Exam:   /70 (BP Location: Left arm)  Pulse 106  Temp 98  F (36.7  C) (Oral)  Resp 16  Ht 1.727 m (5' 8\")  Wt 76 kg (167 lb 9.6 oz)  SpO2 100%  BMI 25.48 kg/m2    Intake/Output Summary (Last 24 hours) at 18 1349  Last data filed at 18 1145   Gross per 24 hour   Intake              700 ml   Output             2200 ml   Net            -1500 ml     General: conversant man sitting up in a chair, NAD  HEENT: anicteric sclera, MMM  CV: RRR, nl S1/S2, distant heart sounds  Lungs: equal b/l air entry, no wheezing, no crackles, no rhonchi, no cough  Abd: soft, NT, ND  Ext: WWP, no BLE edema  Skin: no rashes, cyanosis, or jaundice  Neuro: pleasant, conversant          Data:   Labs (all laboratory studies reviewed by me): no emergent electrolyte abnormalities, stable anemia.    Imaging (all imaging studies reviewed by me):  #. CT C/A/P, 2018:  Chest: Right internal jugular vein dialysis catheter with distal tip in the right atrium. Thyroid is within normal limits. Mediastinal  lymphadenopathy similar to prior, for example anterior tracheal lymph node measuring 7 mm, previously 7 mm (series 3 " image 98) or lymph node at the frank measuring 19 mm, previously 19 mm (series 3 image 127). Heart is enlarged. Coronary calcifications. Improved most of the patchy consolidative aspect of both the lungs with residual and mild patchy groundglass appearance of the lower lobes.  Abdomen and pelvis: Liver, spleen, adrenal glands and left kidney is within normal limits. Stable right kidney cyst. Atrophic pancreas.  Stable pancreatic cyst measuring 12 mm of the head (series 3 image 303) more prominent 12 mm pancreatic cyst on the inferior portion of the (image 372). Prostatomegaly. Decompressed bladder appears within normal limits. Diverticulosis without diverticulitis. Appendix is within normal limits. No suspicious lymphadenopathy. No free pelvic fluid.  Bones and soft tissues: Diffuse degenerative changes. No suspicious bone lesions.    #. PFT, 4/2/18: FEV1 2.60 L (88%), FVC 3.27 L (87%), FEV1/FVC 79% predicted, TLC 5.02 L (77%) is below LLN, DLCO 65% is below LLN. Consistent with mild restriction & mildly reduced diffusion capacity.

## 2018-04-19 NOTE — PLAN OF CARE
Problem: Patient Care Overview  Goal: Plan of Care/Patient Progress Review  Outcome: No Change  D: Pt admitted 4/16 from clinic with worsening SOB for LVAD/heart/kidney txp w/u.  I: Monitored vitals and assessed pt status.   A: A0x4. VSS on 2LPM via NC with O2 sats at 100. ST on tele, -110s. Pt had three brief episodes of tachypnea in evening, each <30 sec and self-resolving; Cards 2 notified, stat EKG and one-time albuterol neb ordered. Denies any other SOB, pain, dizziness, nausea. Anuric, next HD today 4/19. SSI given for HS ME=587, improved to 186 at 0200 recheck. Pt slept between cares, making needs known.  P: Continue to monitor pt status and report changes/concerns to Cards 2.

## 2018-04-19 NOTE — PROGRESS NOTES
04/19/18 1300   Quick Adds   Type of Visit Initial Occupational Therapy Evaluation   Living Environment   Lives With alone   Living Arrangements apartment   Home Accessibility stairs to enter home;stairs within home;tub/shower is not walk in;bed and bath on same level   Number of Stairs to Enter Home 6   Number of Stairs Within Home 24   Stair Railings at Home inside, present on right side;outside, present on left side   Transportation Available car   Self-Care   Dominant Hand right   Usual Activity Tolerance moderate   Current Activity Tolerance moderate   Regular Exercise no   Equipment Currently Used at Home none   Activity/Exercise/Self-Care Comment Pt reports increased effort with ADLs and IADLs prior to admit   Functional Level Prior   Ambulation 0-->independent   Transferring 0-->independent   Toileting 0-->independent   Bathing 0-->independent   Dressing 0-->independent   Eating 0-->independent   Communication 0-->understands/communicates without difficulty   Swallowing 0-->swallows foods/liquids without difficulty   Cognition 0 - no cognition issues reported   Fall history within last six months no   Which of the above functional risks had a recent onset or change? none   General Information   Onset of Illness/Injury or Date of Surgery - Date 04/16/18   Referring Physician Jo Ann Haley MD   Patient/Family Goals Statement Pt is resistent to therapy but states he would like to increase overall activity tolerance   Additional Occupational Profile Info/Pertinent History of Current Problem Murray Nicholson is a 63 yr old male with PMH of HTN, DMII, functionally bicuspid aortic valve, CHF/CM (EF 10-15%), ESRD, admitted with worsening dyspnea/SOB   Precautions/Limitations no known precautions/limitations   Cognitive Status Examination   Orientation orientation to person, place and time   Level of Consciousness alert   Range of Motion (ROM)   ROM Quick Adds No deficits were identified   ROM Comment WFL in BUE    Strength   Manual Muscle Testing Quick Adds No deficits were identified   Strength Comments Overall 5/5 in BUE   Hand Strength   Hand Strength Comments WFL in BUE   Coordination   Fine Motor Coordination WFL in BUE   Instrumental Activities of Daily Living (IADL)   Previous Responsibilities meal prep;medication management;driving   IADL Comments Pt does not work, states he does not clean often due to energy limitaitons. Pt does cook own meals, recently has had sons assisting with laundry due to it being on another floor of apartment   Activities of Daily Living Analysis   Impairments Contributing to Impaired Activities of Daily Living strength decreased;fear and anxiety  (endurance)   General Therapy Interventions   Planned Therapy Interventions strengthening;home program guidelines;progressive activity/exercise   Clinical Impression   Criteria for Skilled Therapeutic Interventions Met yes, treatment indicated   OT Diagnosis Decreased endurance   Influenced by the following impairments Cardiac funciton   Assessment of Occupational Performance 3-5 Performance Deficits   Identified Performance Deficits Aerobic activity, stairs, EC/WS, anxiety management   Clinical Decision Making (Complexity) Low complexity   Therapy Frequency 5 times/wk   Predicted Duration of Therapy Intervention (days/wks) 2 weeks   Anticipated Equipment Needs at Discharge (Will continue to assess)   Anticipated Discharge Disposition Home with Outpatient Therapy   Risks and Benefits of Treatment have been explained. Yes   Patient, Family & other staff in agreement with plan of care Yes

## 2018-04-19 NOTE — PROGRESS NOTES
HEMODIALYSIS TREATMENT NOTE    Date: 4/19/2018  Time: 1:00 PM    Data:  Pre Wt: 76.2 kg (167 lb 15.9 oz)   Desired Wt: 74. kg   Post Wt: 74 kg (163 lb 2.3 oz)  Weight gain: -2.2 kg   Weight change: 2.2 kg  Ultrafiltration - Post Run Net Total Removed (mL): 2200 mL  Ultrafiltration - Post Run Net Total Gain (mL): 0 mL  Vascular Access Status: Yes, secured and visible  Dialyzer Rinse: Streaked  Total Blood Volume Processed: 87.4  Total Dialysis (Treatment) Time:3.5hrs      Lab:   n/a    Interventions:Assessments  Pt dialyzed today for 3.5hrs on a K-2 Ca-3 bath via RCVC. 350 Qb throughout. 2.2kgs UF. He had 2 short episodes of hyperventilation. See MAR for meds given. See Epic for further details. VSS throughout. CVC locked with 1:1000 Heparin per lumen. Report to primary RN Jake.     Plan:    Next HD per renal team

## 2018-04-20 ENCOUNTER — APPOINTMENT (OUTPATIENT)
Dept: OCCUPATIONAL THERAPY | Facility: CLINIC | Age: 63
DRG: 001 | End: 2018-04-20
Attending: INTERNAL MEDICINE
Payer: COMMERCIAL

## 2018-04-20 ENCOUNTER — APPOINTMENT (OUTPATIENT)
Dept: CARDIOLOGY | Facility: CLINIC | Age: 63
DRG: 001 | End: 2018-04-20
Attending: NURSE PRACTITIONER
Payer: COMMERCIAL

## 2018-04-20 ENCOUNTER — APPOINTMENT (OUTPATIENT)
Dept: GENERAL RADIOLOGY | Facility: CLINIC | Age: 63
DRG: 001 | End: 2018-04-20
Attending: NURSE PRACTITIONER
Payer: COMMERCIAL

## 2018-04-20 LAB
ANION GAP SERPL CALCULATED.3IONS-SCNC: 10 MMOL/L (ref 3–14)
ANION GAP SERPL CALCULATED.3IONS-SCNC: 11 MMOL/L (ref 3–14)
BUN SERPL-MCNC: 28 MG/DL (ref 7–30)
BUN SERPL-MCNC: 35 MG/DL (ref 7–30)
CALCIUM SERPL-MCNC: 9.2 MG/DL (ref 8.5–10.1)
CALCIUM SERPL-MCNC: 9.4 MG/DL (ref 8.5–10.1)
CHLORIDE SERPL-SCNC: 94 MMOL/L (ref 94–109)
CHLORIDE SERPL-SCNC: 95 MMOL/L (ref 94–109)
CO2 SERPL-SCNC: 28 MMOL/L (ref 20–32)
CO2 SERPL-SCNC: 28 MMOL/L (ref 20–32)
CREAT SERPL-MCNC: 5.41 MG/DL (ref 0.66–1.25)
CREAT SERPL-MCNC: 6.69 MG/DL (ref 0.66–1.25)
ERYTHROCYTE [DISTWIDTH] IN BLOOD BY AUTOMATED COUNT: 15.9 % (ref 10–15)
GFR SERPL CREATININE-BSD FRML MDRD: 11 ML/MIN/1.7M2
GFR SERPL CREATININE-BSD FRML MDRD: 8 ML/MIN/1.7M2
GLUCOSE BLDC GLUCOMTR-MCNC: 119 MG/DL (ref 70–99)
GLUCOSE BLDC GLUCOMTR-MCNC: 162 MG/DL (ref 70–99)
GLUCOSE BLDC GLUCOMTR-MCNC: 265 MG/DL (ref 70–99)
GLUCOSE BLDC GLUCOMTR-MCNC: 285 MG/DL (ref 70–99)
GLUCOSE SERPL-MCNC: 138 MG/DL (ref 70–99)
GLUCOSE SERPL-MCNC: 159 MG/DL (ref 70–99)
HCT VFR BLD AUTO: 37.9 % (ref 40–53)
HGB BLD-MCNC: 12.3 G/DL (ref 13.3–17.7)
MAGNESIUM SERPL-MCNC: 1.7 MG/DL (ref 1.6–2.3)
MCH RBC QN AUTO: 31.9 PG (ref 26.5–33)
MCHC RBC AUTO-ENTMCNC: 32.5 G/DL (ref 31.5–36.5)
MCV RBC AUTO: 98 FL (ref 78–100)
PLATELET # BLD AUTO: 231 10E9/L (ref 150–450)
POTASSIUM SERPL-SCNC: 4.1 MMOL/L (ref 3.4–5.3)
POTASSIUM SERPL-SCNC: 4.4 MMOL/L (ref 3.4–5.3)
RBC # BLD AUTO: 3.85 10E12/L (ref 4.4–5.9)
SODIUM SERPL-SCNC: 134 MMOL/L (ref 133–144)
SODIUM SERPL-SCNC: 134 MMOL/L (ref 133–144)
WBC # BLD AUTO: 4.7 10E9/L (ref 4–11)

## 2018-04-20 PROCEDURE — 97535 SELF CARE MNGMENT TRAINING: CPT | Mod: GO | Performed by: OCCUPATIONAL THERAPIST

## 2018-04-20 PROCEDURE — 25000132 ZZH RX MED GY IP 250 OP 250 PS 637

## 2018-04-20 PROCEDURE — 93451 RIGHT HEART CATH: CPT | Mod: 26 | Performed by: INTERNAL MEDICINE

## 2018-04-20 PROCEDURE — 25000131 ZZH RX MED GY IP 250 OP 636 PS 637: Performed by: NURSE PRACTITIONER

## 2018-04-20 PROCEDURE — 36415 COLL VENOUS BLD VENIPUNCTURE: CPT | Performed by: NURSE PRACTITIONER

## 2018-04-20 PROCEDURE — 99233 SBSQ HOSP IP/OBS HIGH 50: CPT | Mod: 25 | Performed by: NURSE PRACTITIONER

## 2018-04-20 PROCEDURE — 93451 RIGHT HEART CATH: CPT

## 2018-04-20 PROCEDURE — 40000133 ZZH STATISTIC OT WARD VISIT: Performed by: OCCUPATIONAL THERAPIST

## 2018-04-20 PROCEDURE — 97110 THERAPEUTIC EXERCISES: CPT | Mod: GO | Performed by: OCCUPATIONAL THERAPIST

## 2018-04-20 PROCEDURE — A9270 NON-COVERED ITEM OR SERVICE: HCPCS | Mod: GY | Performed by: STUDENT IN AN ORGANIZED HEALTH CARE EDUCATION/TRAINING PROGRAM

## 2018-04-20 PROCEDURE — 27210787 ZZH MANIFOLD CR2

## 2018-04-20 PROCEDURE — 21400006 ZZH R&B CCU INTERMEDIATE UMMC

## 2018-04-20 PROCEDURE — 83735 ASSAY OF MAGNESIUM: CPT | Performed by: STUDENT IN AN ORGANIZED HEALTH CARE EDUCATION/TRAINING PROGRAM

## 2018-04-20 PROCEDURE — 80048 BASIC METABOLIC PNL TOTAL CA: CPT | Performed by: NURSE PRACTITIONER

## 2018-04-20 PROCEDURE — 85027 COMPLETE CBC AUTOMATED: CPT | Performed by: STUDENT IN AN ORGANIZED HEALTH CARE EDUCATION/TRAINING PROGRAM

## 2018-04-20 PROCEDURE — 27210982 ZZH KIT RT HC TOTES DISP CR7

## 2018-04-20 PROCEDURE — 00000146 ZZHCL STATISTIC GLUCOSE BY METER IP

## 2018-04-20 PROCEDURE — A9270 NON-COVERED ITEM OR SERVICE: HCPCS | Mod: GY

## 2018-04-20 PROCEDURE — 25000128 H RX IP 250 OP 636: Performed by: NURSE PRACTITIONER

## 2018-04-20 PROCEDURE — 4A023N6 MEASUREMENT OF CARDIAC SAMPLING AND PRESSURE, RIGHT HEART, PERCUTANEOUS APPROACH: ICD-10-PCS | Performed by: INTERNAL MEDICINE

## 2018-04-20 PROCEDURE — 80048 BASIC METABOLIC PNL TOTAL CA: CPT | Performed by: STUDENT IN AN ORGANIZED HEALTH CARE EDUCATION/TRAINING PROGRAM

## 2018-04-20 PROCEDURE — A9270 NON-COVERED ITEM OR SERVICE: HCPCS | Mod: GY | Performed by: NURSE PRACTITIONER

## 2018-04-20 PROCEDURE — 25000132 ZZH RX MED GY IP 250 OP 250 PS 637: Mod: GY | Performed by: STUDENT IN AN ORGANIZED HEALTH CARE EDUCATION/TRAINING PROGRAM

## 2018-04-20 PROCEDURE — 25000132 ZZH RX MED GY IP 250 OP 250 PS 637: Mod: GY | Performed by: NURSE PRACTITIONER

## 2018-04-20 PROCEDURE — 25000125 ZZHC RX 250: Performed by: NURSE PRACTITIONER

## 2018-04-20 PROCEDURE — 27211181 ZZH BALLOON TIP PRESSURE CR5

## 2018-04-20 PROCEDURE — 70355 PANORAMIC X-RAY OF JAWS: CPT

## 2018-04-20 PROCEDURE — 36415 COLL VENOUS BLD VENIPUNCTURE: CPT | Performed by: STUDENT IN AN ORGANIZED HEALTH CARE EDUCATION/TRAINING PROGRAM

## 2018-04-20 RX ORDER — DOBUTAMINE HCL IN DEXTROSE 5 % 500MG/250
2.5 INTRAVENOUS SOLUTION INTRAVENOUS CONTINUOUS
Status: DISCONTINUED | OUTPATIENT
Start: 2018-04-20 | End: 2018-04-22

## 2018-04-20 RX ORDER — CHLORHEXIDINE GLUCONATE ORAL RINSE 1.2 MG/ML
15 SOLUTION DENTAL 2 TIMES DAILY
Status: DISPENSED | OUTPATIENT
Start: 2018-04-20 | End: 2018-05-04

## 2018-04-20 RX ADMIN — DOBUTAMINE 2.5 MCG/KG/MIN: 12.5 INJECTION, SOLUTION INTRAVENOUS at 18:18

## 2018-04-20 RX ADMIN — ALBUTEROL SULFATE 2.5 MG: 2.5 SOLUTION RESPIRATORY (INHALATION) at 22:48

## 2018-04-20 RX ADMIN — DOXYCYCLINE HYCLATE 100 MG: 100 CAPSULE ORAL at 07:39

## 2018-04-20 RX ADMIN — ALLOPURINOL 100 MG: 100 TABLET ORAL at 07:39

## 2018-04-20 RX ADMIN — FLUTICASONE PROPIONATE 2 SPRAY: 50 SPRAY, METERED NASAL at 21:34

## 2018-04-20 RX ADMIN — INSULIN ASPART 6 UNITS: 100 INJECTION, SOLUTION INTRAVENOUS; SUBCUTANEOUS at 11:41

## 2018-04-20 RX ADMIN — ASPIRIN 81 MG CHEWABLE TABLET 81 MG: 81 TABLET CHEWABLE at 21:34

## 2018-04-20 RX ADMIN — CHLORHEXIDINE GLUCONATE 15 ML: 1.2 RINSE ORAL at 21:34

## 2018-04-20 RX ADMIN — ATORVASTATIN CALCIUM 40 MG: 40 TABLET, FILM COATED ORAL at 21:34

## 2018-04-20 RX ADMIN — INSULIN ASPART 1 UNITS: 100 INJECTION, SOLUTION INTRAVENOUS; SUBCUTANEOUS at 07:39

## 2018-04-20 RX ADMIN — DOXYCYCLINE HYCLATE 100 MG: 100 CAPSULE ORAL at 20:07

## 2018-04-20 RX ADMIN — OMEPRAZOLE 20 MG: 20 CAPSULE, DELAYED RELEASE ORAL at 21:34

## 2018-04-20 RX ADMIN — CALCIUM ACETATE 667 MG: 667 CAPSULE ORAL at 18:18

## 2018-04-20 RX ADMIN — CALCIUM ACETATE 667 MG: 667 CAPSULE ORAL at 07:39

## 2018-04-20 RX ADMIN — INSULIN GLARGINE 10 UNITS: 100 INJECTION, SOLUTION SUBCUTANEOUS at 22:56

## 2018-04-20 NOTE — PLAN OF CARE
Problem: Patient Care Overview  Goal: Plan of Care/Patient Progress Review  Discharge Planner OT   Patient plan for discharge: home with OP CR  Current status: pt. indep. with ADls and functional transf.s.  Pt. tolerated amb. 800+ ft. with 1 seated rest break.  Pt. amb. at slow,steady pace with no overt LOB. Pt. ambulating without AD.   Barriers to return to prior living situation: medical status  Recommendations for discharge: home with continuing OP CR PhaseII  Rationale for recommendations: to increase activity kerline./strength; to max. Indep. With IADLs/mobility in home/community setting.       Entered by: Lottie Padron 04/20/2018 10:33 AM

## 2018-04-20 NOTE — PROGRESS NOTES
[facility]   Cardiology II Service / Advanced Heart Failure  Daily Progress Note  Date of Service: 4/20/2018      Patient: Murray Nicholson  MRN: [id number]  Admission Date: 4/16/2018  Hospital Day # 4    Assessment and Plan: Murray Nicholson is a 63 year old male DM Type II, SHEELA, GERD, HTN, Dyslipidemia, Severe AI, Severe MR, ESRD on HD, ICM with EF 15-20%. He presented per [facility] for decompensated heart failure.       ICM. 3/15/18 Echo notes EF 15-20%, mild-moderately reduced RV function, bicuspid AV with severe holodiastolic AI, and torn chordae to A3 P3 MR. RHC 3/29/18 consistent with mRA-9, RV-55/16, mPA-36, mPAW-32, BECKA CO-3, BECKA-1.6. NM Lexiscan 1/12/16 negative for ischemia. LHC 2/8/17 with no obstructive CAD. CPX 3/30/18 consistent with RER 1.23, VEVCO2 slope of 68, with FC of 31%. Secondary to ischemic and valvular etiology.   Stage D, NYHA Class IIIB  ACEi/ARB Losartan held due to dizziness.   BB contraindicated due to low cardiac output  Aldosterone antagonist contraindicated due to renal dysfunction  SCD prophylaxis Defer pending LVAD workup.   Fluid status HD yesterday.   Sleep Apnea: CPAP.   - Defer LVAD workup given age cap for heart kidney transplant 65 with current chronic HD.   - Plan for RHC today with initiation of inotropes. He will undergo dental consult to complete evaluation for transplant evaluation with committee presentation next week.        Severe AI and MR.   - Appreciate Surgical consult.       ESRD on HD. Hyperkalemia resolved. K 6.2 4/17 shifted with insulin given issue with tunneled line. S/p IR tunneled line exchange 4/17/18.  - Appreciate consult per Nephrology.   - HD yesterday.   - Midodrine 10 mg po prior to dialysis.       Right maxillary sinusitis. CT Head consistent with right maxillary sinusitis with current complaints of PND.   - Day #3/7 of Doxycycline 100 mg po BID.     Pancreatic mass. 9 mm pancreatic cyst, stable from 1/18 per Radiology concerning for  "benign IMPN. GI curbside appreciated. Pseudocyt vs IPMN.   - Final Abdominal MRI pending.       Chronic Medical Conditions:  GERD. Prilosec.   DM Type II. Novolog increased to high intensity SSI, plan to reduce to medium in AM. Will add Lantus 10 units daily.   AAA. 4.5 x 4.5 cm proximal ascending aortic aneurysm seen on MRI done 2/14. Recent echo 2/2/18 showed size of 4.2 cm.   CAD. BB contraindicated in low output state. ASA 81 mg po daily and Lipitor.       FEN: 2 gram NA diet   PROPHY: Prilosec  LINES: PIV  DISPO:  TBD.   CODE STATUS: Full Code   ================================================================    Interval History/ROS: He complains of episode of SOB this AM. He denies lightheadedness, dizziness, chest pain, palpitations, SOB, cough, nausea, vomiting, diarrhea, and LE edema. He complains of MEADE. He is tolerating oral intake and ambulation.     Last 24 hr care team notes reviewed.   ROS:  4 point ROS including Respiratory, CV, GI and , other than that noted in the HPI, is negative.     Medications: Reviewed in EPIC.     Physical Exam:   BP 99/67 (BP Location: Left arm)  Pulse 103  Temp 97.9  F (36.6  C) (Oral)  Resp 16  Ht 1.727 m (5' 8\")  Wt 76.1 kg (167 lb 11.2 oz)  SpO2 100%  BMI 25.5 kg/m2  GENERAL: Appears alert and oriented times three.   HEENT: Eye symmetrical and free of discharge bilaterally. Mucous membranes moist and without lesions.  NECK: Supple and without lymphadenopathy. JVD at clavicular line upright.   CV: RRR, S1S2 present with III/VI.   RESPIRATORY: Respirations regular, even, and unlabored. Lungs CTA throughout.   GI: Soft and non distended with normoactive bowel sounds present in all quadrants. No tenderness, rebound, guarding. No organomegaly.   EXTREMITIES: No peripheral edema. 2+ bilateral pedal pulses.   NEUROLOGIC: Alert and orientated x 3. CN II-XII grossly intact. No focal deficits.   MUSCULOSKELETAL: No joint swelling or tenderness.   SKIN: No jaundice. No " rashes or lesions.     Data:  CMP  Recent Labs  Lab 04/20/18  0533 04/19/18  0630 04/18/18  0642 04/17/18  1441  04/17/18  0559 04/16/18  1546    137 138  --   --  136 135   POTASSIUM 4.1 4.5 4.2 4.8  < > 6.2* 5.0   CHLORIDE 95 99 100  --   --  102 101   CO2 28 24 27  --   --  16* 18*   ANIONGAP 11 14 11  --   --  18* 15*   * 145* 106*  --   --  260* 253*   BUN 28 47* 29  --   --  76* 63*   CR 5.41* 7.95* 5.81*  --   --  9.72* 8.81*   GFRESTIMATED 11* 7* 10*  --   --  5* 6*   GFRESTBLACK 13* 8* 12*  --   --  7* 7*   TRINI 9.2 9.2 9.0  --   --  9.5 9.6   MAG 1.7 1.6 1.7  --   --  2.0 1.9   PHOS  --   --   --   --   --   --  6.4*   PROTTOTAL  --   --   --   --   --   --  7.4   ALBUMIN  --   --   --   --   --   --  3.5   BILITOTAL  --   --   --   --   --   --  0.8   ALKPHOS  --   --   --   --   --   --  123   AST  --   --   --   --   --   --  17   ALT  --   --   --   --   --   --  22   < > = values in this interval not displayed.  CBC  Recent Labs  Lab 04/20/18  0533 04/19/18  0630 04/18/18  0642 04/16/18  1546   WBC 4.7 4.8 4.9 5.7   RBC 3.85* 3.69* 3.74* 3.74*   HGB 12.3* 11.6* 11.9* 12.3*   HCT 37.9* 37.0* 37.3* 37.4*   MCV 98 100 100 100   MCH 31.9 31.4 31.8 32.9   MCHC 32.5 31.4* 31.9 32.9   RDW 15.9* 16.1* 16.1* 15.7*    199 224 275     INR  Recent Labs  Lab 04/16/18  1546   INR 1.09       Patient seen and discussed with Dr. Minaya.     Jennifer Dean FNANNIKA  4/20/2018

## 2018-04-20 NOTE — PLAN OF CARE
Problem: Patient Care Overview  Goal: Plan of Care/Patient Progress Review  Outcome: No Change  Neuro: A&Ox4.   Cardiac: ST. VSS.   Respiratory: Sating 99% on RA.  GI/: Anuric 2/2 HD. BM 4/19  Diet/appetite: Tolerating 2G NA diet, 2L FR - NPO after breakfast for R heart cath.  Activity:  Up ad edith, up to chair and in halls.  Pain: At acceptable level on current regimen.   Skin: Intact, no new deficits noted.  LDA's: PIV SL, R chest HD port    Plan: Plan for R heart cath this afternoon. Pt having workup for heart/kidney transplant. Scheduled for dialysis tomorrow (HD on T,TH,Sat). Continue with POC. Notify primary team with changes.      Problem: Cardiac: Heart Failure (Adult)  Goal: Signs and Symptoms of Listed Potential Problems Will be Absent, Minimized or Managed (Cardiac: Heart Failure)  Signs and symptoms of listed potential problems will be absent, minimized or managed by discharge/transition of care (reference Cardiac: Heart Failure (Adult) CPG).   Outcome: No Change   04/20/18 1404   Cardiac: Heart Failure   Problems Assessed (Heart Failure) all   Problems Present (Heart Failure) cardiac pump dysfunction;dysrhythmia/arrhythmia       Problem: Diabetes Comorbidity  Goal: Diabetes  Patient comorbidity will be monitored for signs and symptoms of hyperglycemia or hypoglycemia. Problems will be absent, minimized or managed by discharge/transition of care.   Outcome: No Change  BGs 162 & 285, covered w/SS insulin    Problem: Renal Insufficiency Comorbidity  Goal: Renal Insufficiency  Patient comorbidity will be monitored for signs and symptoms of Renal Insufficiency (Chronic) condition.  Problems will be absent, minimized or managed by discharge/transition of care.   Outcome: No Change  Creatinine 5.41 today

## 2018-04-20 NOTE — PLAN OF CARE
Problem: Patient Care Overview  Goal: Plan of Care/Patient Progress Review  Outcome: No Change  D: Pt admitted 4/16 from clinic with decompensated HF. LVAD/heart/kidney w/u in progress. ESRD on HD T/Th/Sat.  I: Monitored vitals and assessed pt status. PRN: albuterol neb x1  A: A0x4. VSS on 1LPM via NC for comfort, sats at 100%. SR/ST on tele, HR 90s-110s. Afebrile. Denies pain, dizziness, nausea. No episodes of tachypnea this shift, however pt requested PRN neb as he feels this helps minimize tachypneic episodes and MEADE better than PRN inhaler. No SSI required for ZH=684 at HS. Anuric and BM x1. Up ind. Pt slept between cares, making needs known.   P: Continue to monitor pt status and report changes/concerns to Cards 2.

## 2018-04-20 NOTE — CONSULTS
Dental Service Consultation        Murray Nicholson MRN# [id number]   YOB: 1955 Age: 63 year old   Date of Admission: 4/16/2018     Reason for consult: I was asked by Jennifer Dean CNP to evaluate this patient for dental clearance for heart transplant.           Assessment and Plan:   Assessment: No evidence of dental infection or stephanie caries noted clinically nor radiographically.  Chronic moderate generalized periodontal disease with significant cervical plaque buildup and moderate creastal bone loss.  Soft tissue and head and neck exam WNL.  Patient reports no symptoms, does admit not seeing a dentist for about 10 years.     Plan:  Patient cleared for heart transplant.  Rx for Peridex written and dispensed.  Advised debridement of plaque with brushing at this time in concert with Peridex rinse to control bacteria.  Advised prophylaxis and exam with primary dentist either prior to transplant or after stable following surgery.               Chief Complaint:   Dental evaluation for heart transplant.         History of Present Illness:   This patient is a 63 year old male admitted to [facility] with CHF.              Past Medical History:     Past Medical History:   Diagnosis Date     (HFpEF) heart failure with preserved ejection fraction (H)      Allergic rhinitis, cause unspecified      Anemia of chronic kidney failure      AS (aortic stenosis)      Ascending aortic aneurysm (H)      Bicuspid aortic valve      CAD (coronary artery disease)      Chronic kidney disease, stage 5 (H)      Congestive heart failure, unspecified      Dyslipidemia      Esophageal reflux      ESRD (end stage renal disease) (H)      Hypersomnia with sleep apnea, unspecified      Hypertension      MGUS (monoclonal gammopathy of unknown significance)      Mitral regurgitation      SHEELA (obstructive sleep apnea)      Systolic heart failure (H)      Type 2 diabetes mellitus (H)              Past Surgical History:     Past  Surgical History:   Procedure Laterality Date     LAPAROSCOPIC HERNIORRHAPHY INGUINAL BILATERAL Bilateral 2015    Procedure: LAPAROSCOPIC HERNIORRHAPHY INGUINAL BILATERAL;  Surgeon: Bobby Mcconnell MD;  Location: [facility] OR     LAPAROSCOPIC INSERTION CATHETER PERITONEAL DIALYSIS N/A 2017    Procedure: LAPAROSCOPIC INSERTION CATHETER PERITONEAL DIALYSIS;  Laparoscopic Peritoneal Dialysis Catheter Placement - Anesthesia with block;  Surgeon: Esteban Arvizu MD;  Location: [facility] OR     REMOVE CATHETER PERITONEAL Right 1/15/2018    Procedure: REMOVE CATHETER PERITONEAL;  Open Removal of Peritoneal Dialysis Catheter ;  Surgeon: Esteban Arvizu MD;  Location: [facility] OR               Social History:     Social History   Substance Use Topics     Smoking status: Former Smoker     Packs/day: 1.00     Years: 19.00     Types: Cigarettes     Quit date: 1994     Smokeless tobacco: Never Used     Alcohol use No             Family History:     Family History   Problem Relation Age of Onset     C.A.D. Father       from-never knew father-age 60     DIABETES Father      CEREBROVASCULAR DISEASE Father      Hypertension No family hx of      Breast Cancer No family hx of      Cancer - colorectal No family hx of      Prostate Cancer No family hx of      KIDNEY DISEASE No family hx of              Immunizations:     Immunization History   Administered Date(s) Administered     HEPA 2008, 2010     HepB 10/07/2015, 2015, 2016     Influenza (H1N1) 2010     Influenza (IIV3) PF 2003, 2006, 2008, 2009, 2010, 10/22/2011, 2012     Influenza Vaccine IM 3yrs+ 4 Valent IIV4 2013, 2014, 2015, 2016, 2017     Mantoux Tuberculin Skin Test 2018     Pneumo Conj 13-V (2010&after) 2015     Pneumococcal 23 valent 2005, 2011     TD (ADULT, 7+) 2004     TDAP Vaccine (Adacel) 2013     Zoster vaccine, live 2015              Allergies:     Allergies   Allergen Reactions     Norco [Hydrocodone-Acetaminophen] Nausea and Vomiting     Cats      Throat tightness     Hydralazine Other (See Comments)     Didn't tolerate secondary to hypotension     Isosorbide Other (See Comments)     hypotension     Penicillins Hives     Seasonal Allergies      rhinitis     Shrimp      Throat closes              Medications:     Current Facility-Administered Medications Ordered in Epic   Medication Dose Route Frequency Last Rate Last Dose     albuterol (PROAIR HFA/PROVENTIL HFA/VENTOLIN HFA) Inhaler 2 puff  2 puff Inhalation Q6H PRN   2 puff at 04/17/18 2113     albuterol neb solution 2.5 mg  2.5 mg Nebulization Q6H PRN   2.5 mg at 04/19/18 2203     allopurinol (ZYLOPRIM) tablet 100 mg  100 mg Oral Daily   100 mg at 04/20/18 0739     aspirin chewable tablet 81 mg  81 mg Oral Daily   81 mg at 04/19/18 2130     atorvastatin (LIPITOR) tablet 40 mg  40 mg Oral Daily   40 mg at 04/19/18 2130     bismuth subsalicylate (PEPTO BISMOL) suspension 15 mL  15 mL Oral Q6H PRN         calcium acetate (PHOSLO) capsule 667 mg  667 mg Oral TID w/meals   667 mg at 04/20/18 0739     chlorhexidine (PERIDEX) 0.12 % solution 15 mL  15 mL Swish & Spit BID         dextrose 50 % injection 25 g  25 g Intravenous Once         glucose gel 15-30 g  15-30 g Oral Q15 Min PRN        Or     dextrose 50 % injection 25-50 mL  25-50 mL Intravenous Q15 Min PRN        Or     glucagon injection 1 mg  1 mg Subcutaneous Q15 Min PRN         doxycycline (VIBRAMYCIN) capsule 100 mg  100 mg Oral Q12H JEAN   100 mg at 04/20/18 0739     fluticasone (FLONASE) 50 MCG/ACT spray 1-2 spray  1-2 spray Both Nostrils Daily   2 spray at 04/19/18 2130     heparin 10,000 units/10 mL infusion (DIALYSIS USE)  500 Units/hr Hemodialysis Machine Continuous 0.5 mL/hr at 04/17/18 1711 500 Units/hr at 04/17/18 1711     HOLD nitroGLYcerin IF   Does not apply HOLD         insulin aspart (NovoLOG) inj (RAPID ACTING)  1-10  Units Subcutaneous TID AC   6 Units at 04/20/18 1141     insulin aspart (NovoLOG) inj (RAPID ACTING)  1-7 Units Subcutaneous At Bedtime         insulin glargine (LANTUS) injection 10 Units  10 Units Subcutaneous At Bedtime         lidocaine (LMX4) kit   Topical Q1H PRN         lidocaine 1 % 1 mL  1 mL Other Q1H PRN         loratadine (CLARITIN) tablet 10 mg  10 mg Oral Daily PRN         midodrine (PROAMATINE) tablet 10 mg  10 mg Oral Daily PRN   10 mg at 04/19/18 0734     naloxone (NARCAN) injection 0.1-0.4 mg  0.1-0.4 mg Intravenous Q2 Min PRN         omeprazole (priLOSEC) CR capsule 20 mg  20 mg Oral Daily   20 mg at 04/19/18 2130     Reason ACE/ARB/ARNI order not selected   Other DOES NOT GO TO MAR         Reason beta blocker not prescribed   Does not apply DOES NOT GO TO MAR         sodium chloride (PF) 0.9% PF flush 3 mL  3 mL Intracatheter Q1H PRN         sodium chloride (PF) 0.9% PF flush 3 mL  3 mL Intracatheter Q8H   3 mL at 04/20/18 0739     traMADol (ULTRAM) tablet 50 mg  50 mg Oral Q6H PRN   50 mg at 04/17/18 2115     No current [facility]-ordered outpatient prescriptions on file.             Review of Systems:   The 10 point Review of Systems is negative other than noted in the HPI            Physical Exam:   Vitals were reviewed  Temp: 97.9  F (36.6  C) Temp src: Oral BP: 99/67 Pulse: 103 Heart Rate: 76 Resp: 16 SpO2: 100 % O2 Device: None (Room air) Oxygen Delivery: 1 LPM    Head and neck exam: WNL    Intraoral exam: No evidence of dental infection or stephanie caries noted clinically nor radiographically.  Moderate periodontal disease with significant cervical plaque buildup and moderate creastal bone loss.       Data:   Radiographic interpretation: Orthopantomogram taken on 4-  Osseous pathology: moderate crestal bone loss  Pulpal Pathology: none  Periodontal Pathology: chronic moderate generalized periodontitis  Caries: not present  Odontogenic pathology: 3rd molars not present    Hernan Parra  KERRIE  PGY1  Pager: [phone]

## 2018-04-20 NOTE — PLAN OF CARE
"Discussion of Heart/Kidney transplant:  Mr Nicholson's case was discussed in today's Transplant meeting, and the decision was to proceed towards combined heart and kidney transplant at a higher priority category.  I spoke to the Kidney transplant team to inform them of above.  It should be noted that the patient was removed from the kidney transplant list in December after multiple \" no shows\" for appointments, returning calls, etc.  Normally the Renal team keeps patients de-listed for a year before consideration is given to re-listing.  However, the coordinator thinks their team might be willing to re-list Murray earlier, because he also needs a heart.    Plan:  Mr Nicholson's case will be presented to the Renal tx team next Wed. 4/25.  If he is approved for re-listing, they will finish up any testing to complete the Renal eval.  Insurance prior auth is being solicited.  Per heart team, the pt would be ineligible for dual-organ transplant when he reaches age 65.  "

## 2018-04-20 NOTE — PROGRESS NOTES
"SPIRITUAL HEALTH SERVICES  SPIRITUAL ASSESSMENT Progress Note (Palliative Focus)  [facility] 6C    PRIMARY FOCUS:     Goals of care    Emotional/spiritual/Oriental orthodox distress    Support for coping    REFERRAL SOURCE: Palliative care spiritual assessment.    ILLNESS CIRCUMSTANCES:   Reviewed documentation. Reflective conversation shared with patient Murray Nicholson which integrated elements of illness and family narratives.     Context of Serious Illness/Symptom(s) - Murray said he has decided to get an LVAD if possible.    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    Religion Distress - Murray shared the story of disliking  Spiritism community he joined that placed a emphasis on encouraging others to join them in their leyda. 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    SPIRITUAL/Pentecostalism COPING:     Sabianism/Leyda - Spiritual, atheist, Murray describes himself as a Spiritism but \"not an Muslim one\". He does not want to press his own beliefs on anyone else.    Spiritual Practice(s) - Murray finds some Spiritism practices appealing.     Emotional/Relational/Existential Connections - Murray's primary relationships are with his sons and his friends. He told stories about the time he spent with Ammon and Momo as children, and his continued hopes for their relationships with him as adults. He wants to encourage them to follow their own passions, just as he has found " "meaning in acting. 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    GOALS OF CARE:    Goals of Care - Murray \"decided I want to live\" when he considered what it would be like for his children to live without him.    Meaning/Sense-Making - Murray finds meaning in his relationships, especially being present for his sons.    PLAN: I will follow for spiritual support.    Gaby Virgen  Palliative   Pager [phone]  [facility] Inpatient Team Consult pager [phone] (M-F 8-4:30)  After-hours Answering Service [phone]   "

## 2018-04-20 NOTE — PROGRESS NOTES
"[facility] [place]    Palliative Care Progress Note    Patient: Murray Nicholson  Date of Admission:  4/16/2018    Recommendations:  -Would recommend low-dose opioids for dyspnea management, discussed with primary team and currently not in option related to transplant  -discussed with primary team trying alternative nebulizer to albuterol due to side effects  -Will sign off for now, please re-consult if needs arise.    Assessment  Murray Nicholson is a 63 year old male with advanced heart failure and end-stage renal disease being considered for advanced  heart failure therapy who is now being listed for heart/kidney transplant.    Symptoms:    Dyspnea: Has not been having any dyspnea spells lately.  He had one this morning that was pretty mild and he was able to breathe through it.  He also has been finding the nebulizers helpful for his breathing but when uses at night feels a little bit more energetic afterwards, not greatly impacting his sleep at night however.  Discussed also the role of opioids and dyspnea management, he had a bad reaction to oxycodone is what he tells me, on Norco listed as an allergy in his chart.   Anxiety: He is not really sure if anxiety is a component that plays into his \"sp ells\", however we reviewed the tools that he has learned to manage his episodes make me wonder if there is that component present of anxiety.  This is also very normal if someone cannot breathe to be anxious.    Goals of care: Discussed with patient today importance of advance care planning discussions with his children about his wishes that he cannot speak for himself, he does acknowledge that this is an important thing to start to consider, suggested that if there was a good time to meet with his family that I would be happy to be involved and to notify the primary team timing.    These recommendations have been discussed with team.    VERONICA Prince CNS  Palliative Care Consult " Team  Pager: [phone]    [facility] Inpatient Team Consult pager [phone] (M-F 8-4:30)  After-hours Answering Service [phone]   Palliative Clinic: [phone]       Interval History:      Some increased SOB intermittently, one happened this morning and was really not that bad, he jokes about how little his heart is working and surprised it has not given out on him yet.       Medications:   I have reviewed this patient's medication profile and medications during this hospitalization  Albuterol every 6 hours as needed, used once last night        Review of Systems:   Palliative Symptom Review (0=no symptom/no concern, 1=mild, 2=moderate, 3=severe):      Pain: 0      Fatigue: 0      Nausea: 0      Constipation: 0      Diarrhea: 0      Depressive Symptoms: 0      Anxiety: 0      Drowsiness: 0      Poor Appetite: 0      Shortness of Breath: 2      Insomnia: 0          Physical Exam:     Vital Signs: Temp: 97.9  F (36.6  C) Temp src: Oral BP: 99/67 Pulse: 103 Heart Rate: 76 Resp: 16 SpO2: 100 % O2 Device: None (Room air) Oxygen Delivery: 1 LPM  Weight: 167 lbs 11.2 oz    Physical Exam:  Constitutional: Awake, alert, cooperative, no apparent distress.  Lungs: No increased work of breathing  Neurologic: Awake, alert, oriented to name, place and time.    Neuropsychiatric: Normal affect, mood, orientation, memory and insight.    Data Reviewed:     ROUTINE ICU LABS (Last four results)  CMP  Recent Labs  Lab 04/20/18  0533 04/19/18  0630 04/18/18  0642 04/17/18  1441  04/17/18  0559 04/16/18  1546    137 138  --   --  136 135   POTASSIUM 4.1 4.5 4.2 4.8  < > 6.2* 5.0   CHLORIDE 95 99 100  --   --  102 101   CO2 28 24 27  --   --  16* 18*   ANIONGAP 11 14 11  --   --  18* 15*   * 145* 106*  --   --  260* 253*   BUN 28 47* 29  --   --  76* 63*   CR 5.41* 7.95* 5.81*  --   --  9.72* 8.81*   GFRESTIMATED 11* 7* 10*  --   --  5* 6*   GFRESTBLACK 13* 8* 12*  --   --  7* 7*   TRINI 9.2 9.2 9.0  --   --  9.5 9.6    MAG 1.7 1.6 1.7  --   --  2.0 1.9   PHOS  --   --   --   --   --   --  6.4*   PROTTOTAL  --   --   --   --   --   --  7.4   ALBUMIN  --   --   --   --   --   --  3.5   BILITOTAL  --   --   --   --   --   --  0.8   ALKPHOS  --   --   --   --   --   --  123   AST  --   --   --   --   --   --  17   ALT  --   --   --   --   --   --  22   < > = values in this interval not displayed.  CBC  Recent Labs  Lab 04/20/18  0533 04/19/18  0630 04/18/18  0642 04/16/18  1546   WBC 4.7 4.8 4.9 5.7   RBC 3.85* 3.69* 3.74* 3.74*   HGB 12.3* 11.6* 11.9* 12.3*   HCT 37.9* 37.0* 37.3* 37.4*   MCV 98 100 100 100   MCH 31.9 31.4 31.8 32.9   MCHC 32.5 31.4* 31.9 32.9   RDW 15.9* 16.1* 16.1* 15.7*    199 224 275     INR  Recent Labs  Lab 04/16/18  1546   INR 1.09     Arterial Blood GasNo lab results found in last 7 days.    VERONICA Prince CNS  Palliative Care Consult Team  Pager: [phone]    [facility] Inpatient Team Consult pager [phone] (M-F 8-4:30)  After-hours Answering Service [phone]  Palliative Clinic: [phone]     Total time spent was 35 minutes,  >50% of time was spent counseling and/or coordination of care regarding symptom management and goals of care.

## 2018-04-20 NOTE — PROGRESS NOTES
Brief GI note:     Contacted by cardiology team regarding patient's CT showing pancreatic cyst.     Patient is a 62 yo M with h/o type 2 DM, SHEELA, GERD, HTN, HLD, severe AI, severe MR, ICM with EF 15-20%, ESRD on HD, admitted for decompensated heart failure, and who is currently undergoing evaluation for heart transplant. Patient had CT 4/16/18 for LVAD/transplant workup, had incidental finding of a 1.2 cm pancreatic cyst at pancreatic head and 1.2 cm cyst at inferior portion of pancreas; and pancreatic atrophy.     Patient has no known history of pancreatitis.  Was never a heavy drinker, stopped drinking at all last year. Former smoker, quit in 1994.       Discussed findings and reviewed imaging with Dr. Vogel, feels that the cysts likely represents small IPMN (other differentials include pseudocysts, but low suspicion given no prior h/o pancreatitis and cyst at multiple locations). Statistically speaking, the likelihood of a small IPMN (<3cm) being malignant is low. We recommend MRI/MRCP to further evaluate at this time (w/o contrast given patient has ESRD)         Briefly reviewed MRI/MRCP with Dr. Vogel, no obvious worrisome features, but highly recommend to wait for final MRCP reading from Radiology to confirm.   If radiology confirms no worrisome features on MRCP, then patient can be followed with repeat CT/MRI yearly for surveillance.       Please page GI panc/bili team if there is further questions.     Hattie Kam  GI fellow  p [phone]

## 2018-04-21 LAB
ANION GAP SERPL CALCULATED.3IONS-SCNC: 15 MMOL/L (ref 3–14)
BUN SERPL-MCNC: 41 MG/DL (ref 7–30)
CALCIUM SERPL-MCNC: 9.2 MG/DL (ref 8.5–10.1)
CHLORIDE SERPL-SCNC: 96 MMOL/L (ref 94–109)
CO2 SERPL-SCNC: 24 MMOL/L (ref 20–32)
CREAT SERPL-MCNC: 7.72 MG/DL (ref 0.66–1.25)
ERYTHROCYTE [DISTWIDTH] IN BLOOD BY AUTOMATED COUNT: 15.8 % (ref 10–15)
GFR SERPL CREATININE-BSD FRML MDRD: 7 ML/MIN/1.7M2
GLUCOSE BLDC GLUCOMTR-MCNC: 189 MG/DL (ref 70–99)
GLUCOSE BLDC GLUCOMTR-MCNC: 244 MG/DL (ref 70–99)
GLUCOSE BLDC GLUCOMTR-MCNC: 248 MG/DL (ref 70–99)
GLUCOSE SERPL-MCNC: 111 MG/DL (ref 70–99)
HCT VFR BLD AUTO: 37.7 % (ref 40–53)
HGB BLD-MCNC: 12 G/DL (ref 13.3–17.7)
MAGNESIUM SERPL-MCNC: 1.6 MG/DL (ref 1.6–2.3)
MCH RBC QN AUTO: 31.3 PG (ref 26.5–33)
MCHC RBC AUTO-ENTMCNC: 31.8 G/DL (ref 31.5–36.5)
MCV RBC AUTO: 98 FL (ref 78–100)
PHOSPHATE SERPL-MCNC: 5.4 MG/DL (ref 2.5–4.5)
PLATELET # BLD AUTO: 201 10E9/L (ref 150–450)
POTASSIUM SERPL-SCNC: 4 MMOL/L (ref 3.4–5.3)
RBC # BLD AUTO: 3.83 10E12/L (ref 4.4–5.9)
SODIUM SERPL-SCNC: 135 MMOL/L (ref 133–144)
WBC # BLD AUTO: 4.9 10E9/L (ref 4–11)

## 2018-04-21 PROCEDURE — 25000132 ZZH RX MED GY IP 250 OP 250 PS 637: Mod: GY | Performed by: INTERNAL MEDICINE

## 2018-04-21 PROCEDURE — 80048 BASIC METABOLIC PNL TOTAL CA: CPT | Performed by: STUDENT IN AN ORGANIZED HEALTH CARE EDUCATION/TRAINING PROGRAM

## 2018-04-21 PROCEDURE — 90937 HEMODIALYSIS REPEATED EVAL: CPT

## 2018-04-21 PROCEDURE — 25000132 ZZH RX MED GY IP 250 OP 250 PS 637: Mod: GY

## 2018-04-21 PROCEDURE — 25000128 H RX IP 250 OP 636: Performed by: INTERNAL MEDICINE

## 2018-04-21 PROCEDURE — 85027 COMPLETE CBC AUTOMATED: CPT | Performed by: NURSE PRACTITIONER

## 2018-04-21 PROCEDURE — 40000275 ZZH STATISTIC RCP TIME EA 10 MIN

## 2018-04-21 PROCEDURE — A9270 NON-COVERED ITEM OR SERVICE: HCPCS | Mod: GY | Performed by: INTERNAL MEDICINE

## 2018-04-21 PROCEDURE — 99233 SBSQ HOSP IP/OBS HIGH 50: CPT | Performed by: NURSE PRACTITIONER

## 2018-04-21 PROCEDURE — 00000146 ZZHCL STATISTIC GLUCOSE BY METER IP

## 2018-04-21 PROCEDURE — 36415 COLL VENOUS BLD VENIPUNCTURE: CPT | Performed by: STUDENT IN AN ORGANIZED HEALTH CARE EDUCATION/TRAINING PROGRAM

## 2018-04-21 PROCEDURE — 25000132 ZZH RX MED GY IP 250 OP 250 PS 637: Mod: GY | Performed by: NURSE PRACTITIONER

## 2018-04-21 PROCEDURE — 80048 BASIC METABOLIC PNL TOTAL CA: CPT | Performed by: NURSE PRACTITIONER

## 2018-04-21 PROCEDURE — 21400006 ZZH R&B CCU INTERMEDIATE UMMC

## 2018-04-21 PROCEDURE — 25000125 ZZHC RX 250: Performed by: NURSE PRACTITIONER

## 2018-04-21 PROCEDURE — 84100 ASSAY OF PHOSPHORUS: CPT | Performed by: STUDENT IN AN ORGANIZED HEALTH CARE EDUCATION/TRAINING PROGRAM

## 2018-04-21 PROCEDURE — A9270 NON-COVERED ITEM OR SERVICE: HCPCS | Mod: GY | Performed by: STUDENT IN AN ORGANIZED HEALTH CARE EDUCATION/TRAINING PROGRAM

## 2018-04-21 PROCEDURE — 83735 ASSAY OF MAGNESIUM: CPT | Performed by: STUDENT IN AN ORGANIZED HEALTH CARE EDUCATION/TRAINING PROGRAM

## 2018-04-21 PROCEDURE — 25000132 ZZH RX MED GY IP 250 OP 250 PS 637: Mod: GY | Performed by: STUDENT IN AN ORGANIZED HEALTH CARE EDUCATION/TRAINING PROGRAM

## 2018-04-21 PROCEDURE — A9270 NON-COVERED ITEM OR SERVICE: HCPCS | Mod: GY | Performed by: NURSE PRACTITIONER

## 2018-04-21 PROCEDURE — A9270 NON-COVERED ITEM OR SERVICE: HCPCS | Mod: GY

## 2018-04-21 PROCEDURE — 94640 AIRWAY INHALATION TREATMENT: CPT

## 2018-04-21 RX ORDER — HEPARIN SODIUM 1000 [USP'U]/ML
500 INJECTION, SOLUTION INTRAVENOUS; SUBCUTANEOUS
Status: COMPLETED | OUTPATIENT
Start: 2018-04-21 | End: 2018-04-21

## 2018-04-21 RX ORDER — HEPARIN SODIUM 1000 [USP'U]/ML
500 INJECTION, SOLUTION INTRAVENOUS; SUBCUTANEOUS CONTINUOUS
Status: DISCONTINUED | OUTPATIENT
Start: 2018-04-21 | End: 2018-04-21

## 2018-04-21 RX ORDER — DOXERCALCIFEROL 4 UG/2ML
0.5 INJECTION INTRAVENOUS
Status: COMPLETED | OUTPATIENT
Start: 2018-04-21 | End: 2018-04-21

## 2018-04-21 RX ADMIN — INSULIN ASPART 2 UNITS: 100 INJECTION, SOLUTION INTRAVENOUS; SUBCUTANEOUS at 18:45

## 2018-04-21 RX ADMIN — DOXYCYCLINE HYCLATE 100 MG: 100 CAPSULE ORAL at 19:22

## 2018-04-21 RX ADMIN — SODIUM CHLORIDE 250 ML: 9 INJECTION, SOLUTION INTRAVENOUS at 13:45

## 2018-04-21 RX ADMIN — CALCIUM ACETATE 667 MG: 667 CAPSULE ORAL at 13:46

## 2018-04-21 RX ADMIN — ALLOPURINOL 100 MG: 100 TABLET ORAL at 07:51

## 2018-04-21 RX ADMIN — CALCIUM ACETATE 667 MG: 667 CAPSULE ORAL at 07:51

## 2018-04-21 RX ADMIN — MIDODRINE HYDROCHLORIDE 10 MG: 5 TABLET ORAL at 07:52

## 2018-04-21 RX ADMIN — ATORVASTATIN CALCIUM 40 MG: 40 TABLET, FILM COATED ORAL at 22:11

## 2018-04-21 RX ADMIN — HEPARIN SODIUM 500 UNITS/HR: 1000 INJECTION, SOLUTION INTRAVENOUS; SUBCUTANEOUS at 13:46

## 2018-04-21 RX ADMIN — ASPIRIN 81 MG CHEWABLE TABLET 81 MG: 81 TABLET CHEWABLE at 22:11

## 2018-04-21 RX ADMIN — DOXERCALCIFEROL 0.5 MCG: 4 INJECTION, SOLUTION INTRAVENOUS at 15:07

## 2018-04-21 RX ADMIN — DOXYCYCLINE HYCLATE 100 MG: 100 CAPSULE ORAL at 07:51

## 2018-04-21 RX ADMIN — CHLORHEXIDINE GLUCONATE 15 ML: 1.2 RINSE ORAL at 19:22

## 2018-04-21 RX ADMIN — CHLORHEXIDINE GLUCONATE 15 ML: 1.2 RINSE ORAL at 07:51

## 2018-04-21 RX ADMIN — OMEPRAZOLE 20 MG: 20 CAPSULE, DELAYED RELEASE ORAL at 22:11

## 2018-04-21 RX ADMIN — CALCIUM ACETATE 667 MG: 667 CAPSULE ORAL at 17:47

## 2018-04-21 RX ADMIN — ALBUTEROL SULFATE 2.5 MG: 2.5 SOLUTION RESPIRATORY (INHALATION) at 21:43

## 2018-04-21 RX ADMIN — HEPARIN SODIUM 500 UNITS: 1000 INJECTION, SOLUTION INTRAVENOUS; SUBCUTANEOUS at 13:45

## 2018-04-21 RX ADMIN — FLUTICASONE PROPIONATE 2 SPRAY: 50 SPRAY, METERED NASAL at 22:11

## 2018-04-21 RX ADMIN — INSULIN GLARGINE 10 UNITS: 100 INJECTION, SOLUTION SUBCUTANEOUS at 22:09

## 2018-04-21 RX ADMIN — SODIUM CHLORIDE 300 ML: 9 INJECTION, SOLUTION INTRAVENOUS at 13:45

## 2018-04-21 RX ADMIN — INSULIN ASPART 5 UNITS: 100 INJECTION, SOLUTION INTRAVENOUS; SUBCUTANEOUS at 13:47

## 2018-04-21 NOTE — PROGRESS NOTES
[facility]   Cardiology II Service / Advanced Heart Failure  Daily Progress Note  Date of Service: 4/21/2018      Patient: Murray Nicholson  MRN: [id number]  Admission Date: 4/16/2018  Hospital Day # 5    Assessment and Plan: Murray Nicholson is a 63 year old male DM Type II, SHEELA, GERD, HTN, Dyslipidemia, Severe AI, Severe MR, ESRD on HD, ICM with EF 15-20%. He presented per [facility] for decompensated heart failure.       ICM. 3/15/18 Echo notes EF 15-20%, mild-moderately reduced RV function, bicuspid AV with severe holodiastolic AI, and torn chordae to A3 P3 MR. RHC 3/29/18 consistent with mRA-9, RV-55/16, mPA-36, mPAW-32, BECKA CO-3, BECKA-1.6. NM Lexiscan 1/12/16 negative for ischemia. LHC 2/8/17 with no obstructive CAD. CPX 3/30/18 consistent with RER 1.23, VEVCO2 slope of 68, with FC of 31%. Secondary to ischemic and valvular etiology.   Stage D, NYHA Class IIIB  ACEi/ARB Held in setting of symptomatic hypotension.   BB contraindicated due to low cardiac output. Dobutamine 2.5 mcg/kg/min.   Aldosterone antagonist: Contraindicated due to renal dysfunction  SCD prophylaxis Defer pending LVAD workup.   Fluid status HD today, discussed mRA of 1 with Nephrology with no need for fluid removal.   Sleep Apnea: CPAP.   - Dental cleared for transplant.   - RHC 4/20/18 mRA-1, mPA-27, mPCW-10, BECKA CO-3.17, and BECKA CI-1.68. Dobutamine 2.5 mcg/kg/min.    - Mild hypotension this AM, but asymptomatic and MAP 77 at this time.       ESRD on HD. Hyperkalemia resolved. K 6.2 4/17 shifted with insulin given issue with tunneled line. S/p IR tunneled line exchange 4/17/18.  - Appreciate consult per Nephrology.   - HD today.   - Midodrine 10 mg po prior to dialysis.     Severe AI and MR.   - Appreciate Surgical consult.       Right maxillary sinusitis. CT Head consistent with right maxillary sinusitis with current complaints of PND.   - Day #4/7 of Doxycycline 100 mg po BID.      Pancreatic mass. 9 mm pancreatic cyst, stable  "from 1/18 per Radiology concerning for benign IMPN. GI curbside appreciated. Pseudocyt vs IPMN.   - Final Abdominal MRI pending, notified radiology for final read.       Chronic Medical Conditions:  GERD. Prilosec.   DM Type II. Novolog increased to high intensity SSI. Lantus 10 units daily. BG-111-141 today, will continue current Novolog SSI.   AAA. 4.5 x 4.5 cm proximal ascending aortic aneurysm seen on MRI done 2/14. Recent echo 2/2/18 showed size of 4.2 cm.   CAD. BB contraindicated in low output state. ASA 81 mg po daily and Lipitor.       FEN: 2 gram NA diet   PROPHY: Prilosec  LINES: PIV  DISPO:  TBD.   CODE STATUS: Full Code   ================================================================  Interval History/ROS: He is feeling well. He denies lightheadedness, dizziness, chest pain, palpitations, MEADE, PND, cough, nausea, vomiting, diarrhea, and LE edema. He is tolerating oral intake with limited ambulation this AM.     Last 24 hr care team notes reviewed.   ROS:  4 point ROS including Respiratory, CV, GI and , other than that noted in the HPI, is negative.     Medications: Reviewed in EPIC.     Physical Exam:   /71 (BP Location: Left arm)  Pulse 107  Temp 97.9  F (36.6  C) (Oral)  Resp 16  Ht 1.727 m (5' 8\")  Wt 74.7 kg (164 lb 10.9 oz)  SpO2 100%  BMI 25.04 kg/m2  GENERAL: Appears alert and oriented times three.   HEENT: Eye symmetrical and free of discharge bilaterally. Mucous membranes moist and without lesions.  NECK: Supple and without lymphadenopathy. JVD below clavicular line upright.   CV: Regular, tachycardic. S1S2 present with III/VI murmur at LSB.   RESPIRATORY: Respirations regular, even, and unlabored. Lungs CTA throughout.   GI: Soft and non distended with normoactive bowel sounds present in all quadrants. No tenderness, rebound, guarding. No organomegaly.   EXTREMITIES: No peripheral edema. Warm LE. 2+ bilateral pedal pulses.   NEUROLOGIC: Alert and orientated x 3. CN II-XII " grossly intact. No focal deficits.   MUSCULOSKELETAL: No joint swelling or tenderness.   SKIN: No jaundice. No rashes or lesions.     Data:  CMP  Recent Labs  Lab 04/21/18  0640 04/20/18  1847 04/20/18  0533 04/19/18  0630 04/18/18  0642  04/16/18  1546    134 134 137 138  < > 135   POTASSIUM 4.0 4.4 4.1 4.5 4.2  < > 5.0   CHLORIDE 96 94 95 99 100  < > 101   CO2 24 28 28 24 27  < > 18*   ANIONGAP 15* 10 11 14 11  < > 15*   * 159* 138* 145* 106*  < > 253*   BUN 41* 35* 28 47* 29  < > 63*   CR 7.72* 6.69* 5.41* 7.95* 5.81*  < > 8.81*   GFRESTIMATED 7* 8* 11* 7* 10*  < > 6*   GFRESTBLACK 9* 10* 13* 8* 12*  < > 7*   TRINI 9.2 9.4 9.2 9.2 9.0  < > 9.6   MAG 1.6  --  1.7 1.6 1.7  < > 1.9   PHOS  --   --   --   --   --   --  6.4*   PROTTOTAL  --   --   --   --   --   --  7.4   ALBUMIN  --   --   --   --   --   --  3.5   BILITOTAL  --   --   --   --   --   --  0.8   ALKPHOS  --   --   --   --   --   --  123   AST  --   --   --   --   --   --  17   ALT  --   --   --   --   --   --  22   < > = values in this interval not displayed.  CBC  Recent Labs  Lab 04/21/18  0640 04/20/18  0533 04/19/18  0630 04/18/18  0642   WBC 4.9 4.7 4.8 4.9   RBC 3.83* 3.85* 3.69* 3.74*   HGB 12.0* 12.3* 11.6* 11.9*   HCT 37.7* 37.9* 37.0* 37.3*   MCV 98 98 100 100   MCH 31.3 31.9 31.4 31.8   MCHC 31.8 32.5 31.4* 31.9   RDW 15.8* 15.9* 16.1* 16.1*    231 199 224     INR  Recent Labs  Lab 04/16/18  1546   INR 1.09     Patient discussed with Dr. Rei Dean FNP  4/21/2018

## 2018-04-21 NOTE — PROGRESS NOTES
HEMODIALYSIS TREATMENT NOTE    Date: 4/21/2018  Time: 5:06 PM    Data:  Pre Wt: 74.7 kg (164 lb 10.9 oz)   Desired Wt: 71.7 kg   Post Wt: 74.7 kg (164 lb 10.9 oz)  Weight gain: 0 kg   Weight change: 0 kg  Ultrafiltration - Post Run Net Total Removed (mL): 0 mL  Ultrafiltration - Post Run Net Total Gain (mL): 0 mL  Vascular Access Status: Yes, secured and visible  Dialyzer Rinse: Streaked  Total Blood Volume Processed: 81.2  Total Dialysis (Treatment) Time:  3.75 hours     Lab:   No    Interventions:Assessment:  Pt tolerated tx well. No fluid obtained per PA d/t low BP's. BP's stable through tx. Ending tx /73. CVC utilized without complication. New tegos placed. CVC lumens saline locked. Hand off report given to primary nurse.     Plan:    Per nephrology team.

## 2018-04-21 NOTE — PLAN OF CARE
"Problem: Renal Insufficiency Comorbidity  Goal: Renal Insufficiency  Patient comorbidity will be monitored for signs and symptoms of Renal Insufficiency (Chronic) condition.  Problems will be absent, minimized or managed by discharge/transition of care.   Outcome: No Change  /71 (BP Location: Left arm)  Pulse 107  Temp 97.9  F (36.6  C) (Oral)  Resp 16  Ht 1.727 m (5' 8\")  Wt 74.7 kg (164 lb 10.9 oz)  SpO2 100%  BMI 25.04 kg/m2    Anuric, scheduled for HD this AM but had to be rescheduled for afternoon run d/t BP 68/54. Gave midodrine 10mg and notified MD. BP now 106/71, improving; patient will go to HD at ~ 1230. Patient c/o slight dizziness otherwise asymptomatic. Instructed patient to stand up slowly and call for help for assistance. Denies SOB without activity, which has been his baseline.       "

## 2018-04-21 NOTE — PLAN OF CARE
"Problem: Diabetes Comorbidity  Goal: Diabetes  Patient comorbidity will be monitored for signs and symptoms of hyperglycemia or hypoglycemia. Problems will be absent, minimized or managed by discharge/transition of care.   Outcome: No Change  /71 (BP Location: Left arm)  Pulse 107  Temp 97.6  F (36.4  C) (Oral)  Resp 16  Ht 1.727 m (5' 8\")  Wt 74.7 kg (164 lb 10.9 oz)  SpO2 100%  BMI 25.04 kg/m2    BG this , no sliding scale insulin given. Patient proactive with diet and general health.      "

## 2018-04-21 NOTE — PROGRESS NOTES
Shift: 0700 - 1930  VS: Temp: 98  F (36.7  C) Temp src: Oral BP: 101/73 Pulse: 107 Heart Rate: 100 Resp: 18 SpO2: 100 % O2 Device: None (Room air)    Neuro: A&Ox4, Denies N&T. Neuro's intact. Wears glasses.   Cardiac: S/tach, BP's soft. Midodrine 10 mg given x1 in AM. 2L Fluid restriction discontinued, however, patient states he will continue to monitor his fluid intake. Dobutamine GTT at 2.9ml/hr (0.1903mg/min). BP was soft at start of shift lowest was 68/49, improved to 106/71 prior to dialysis. Dialysis from 1230 - 1800, no fluid removed.   Respiratory: Lung sounds clear, RA. Denies SOB  GI/:  Anuric. LBM 4/20. Bowel sounds active and audible.   Diet/appetite: Tolerating a 2gram Na diet, good appetite. BG's AC/HS (111, 244, 189)  Activity: Independent  Pain: Denies pain.   Skin: Intact  LDA's: Kulwinder R IJ, PIV to LUE SL  Pertinent Labs/Lab Collection: Cr 7.72, Phos 5.4, GFR 9.     Plan:

## 2018-04-21 NOTE — PLAN OF CARE
Pt VSS,BP soft.Had right heart cath thru IJ this pat,No anesthesia needed.site CDI.Pt started on dobutamine gtt at 2.9ml/hr.,education handout given to him on med.BMP done.CRT 6.69,pt anuric.oral antibiotic and flonase given for sinus infection.Will have neb at hs.Continue with POC.

## 2018-04-21 NOTE — PROGRESS NOTES
Nephrology Progress Note  04/21/2018         Assessment & Recommendations:   Murray Nicholson is a 63 yr old male with PMH of HTN, DMII, functionally bicuspid aortic valve, CHF/CM (EF 10-15%), ESRD, admitted with worsening dyspnea/SOB.      ESKD: due to DMII, on PD since Aug 2017, changed to iHD 1/2018 due to polymicrobial and fungal peritonitis, now on TTS schedule at [facility] under care of Dr Mcpherson. Access: tunneled RIJ (replaced 4/17/18). Run time: 4 hrs; EDW 77 kg.  - Dialysis per TTS schedule   - Heparin lock CVC     Access: just had tunneled RIJ replaced on 3/22/18; however wasn't working well so replaced again 4/17/18.          Volume: 77 kg, recently (2/2018) challenged with significant improvement in PCW. RHC on 4/20 with PCW of 10, RA 1.  -  Given very low RA pressure, will reset EDW at 77 kg  - No UF today  - Daily standing weights please     Severe AV and MR insufficiency:   BP/congestive CM (EF 10-15%); minimal CAD per angio on 2/8/17. Chronically hypotensive with tachycardia  - Goal MAP > 65 and SBP > 95 while dialyzing  - Now on dobutamine 2.5 mcg/kg/min (started 4/20/18)  - being evaluated for heart/kidney transplant, will likely remain inpatient awaiting transplant         Anemia: hgb 12.0; Recent labs with hgb in 11's, ferritin 371, IS 12, iron 36; on venofer 50 mg qTuesday, ANASTASIYA recently stopped.  - Continue venofer      BMD: calcium 9.2, alb 3.5, phos 6.4 (4/16); recent ; on hectorol 0.5 mcg via HD; on phoslo 1 tab TID with meals.   - Continue hectorol via HD  - Continue Phoslo (will monitor phos level, may need to switch to sevelamer and increase dose)    Recommendations were communicated to primary team via this note and phone conversation.       Kristi Armas PA-C  Division of Kidney Disease  [phone]    Interval History :   Seen bedside, will dialyze later today. RHC last night 4/20 with wedge of 10, RA 1; dobutamine ggt started. Will increase EDW given very low RA. Heart tx  "eval being completed, will likely remain inpatient waiting for tx. Denies n/v, CP, chills.    Review of Systems:   4 point ROS negative other than as noted above.    Physical Exam:   I/O last 3 completed shifts:  In: 538.93 [P.O.:505; I.V.:33.93]  Out: -    BP (!) 86/57 (BP Location: Left arm)  Pulse 107  Temp 97.6  F (36.4  C) (Oral)  Resp 16  Ht 1.727 m (5' 8\")  Wt 74.7 kg (164 lb 10.9 oz)  SpO2 100%  BMI 25.04 kg/m2     GENERAL APPEARANCE: alert, NAD  EYES: no scleral icterus, pupils equal   Pulmonary: lungs clear to auscultation with equal breath sounds bilaterally   CV: regular rhythm, normal rate    - Edema none  GI: soft, nontender, normal bowel sounds  MS: no evidence of inflammation in joints, no muscle tenderness  : no doyle  SKIN: no rash, warm, dry, no cyanosis  NEURO: face symmetric, no asterixis   Access: tunneled RIJ       Labs:   All labs reviewed by me  Electrolytes/Renal -   Recent Labs   Lab Test  04/21/18   0640  04/20/18   1847  04/20/18   0533  04/19/18   0630   04/16/18   1546   02/12/18   0656   01/12/18   0742   NA  135  134  134  137   < >  135   < >  136   < >  137   POTASSIUM  4.0  4.4  4.1  4.5   < >  5.0   < >  4.5   < >  3.5   CHLORIDE  96  94  95  99   < >  101   < >  103   < >  98   CO2  24  28  28  24   < >  18*   < >  22   < >  32   BUN  41*  35*  28  47*   < >  63*   < >  37*   < >  38*   CR  7.72*  6.69*  5.41*  7.95*   < >  8.81*   < >  7.12*   < >  8.27*   GLC  111*  159*  138*  145*   < >  253*   < >  121*   < >  250*   TRINI  9.2  9.4  9.2  9.2   < >  9.6   < >  8.8   < >  8.4*   MAG  1.6   --   1.7  1.6   < >  1.9   --   1.9   < >  1.7   PHOS   --    --    --    --    --   6.4*   --   4.1   --   3.3    < > = values in this interval not displayed.       CBC -   Recent Labs   Lab Test  04/21/18   0640  04/20/18   0533  04/19/18   0630   WBC  4.9  4.7  4.8   HGB  12.0*  12.3*  11.6*   PLT  201  231  199       LFTs -   Recent Labs   Lab Test  04/16/18   1546  03/07/18   " 0833  02/19/18   1558  02/02/18   1736   ALKPHOS  123  129  102  86   BILITOTAL  0.8  0.7  0.6  0.4   ALT  22  21  15  16   AST  17  18  18  20   PROTTOTAL  7.4   --   7.2  6.2*   ALBUMIN  3.5   --   2.7*  2.1*       Iron Panel -   Recent Labs   Lab Test  07/19/17   1306  07/05/17   1204  06/21/17   1058   IRON  46  26*  69   IRONSAT  18  12*  29   ALBERTINA  369  542*  557*         Imaging:  Reviewed    Current Medications:    sodium chloride 0.9%  250 mL Intravenous Once in dialysis     sodium chloride 0.9%  300 mL Hemodialysis Machine Once     allopurinol  100 mg Oral Daily     aspirin  81 mg Oral Daily     atorvastatin  40 mg Oral Daily     calcium acetate  667 mg Oral TID w/meals     chlorhexidine  15 mL Swish & Spit BID     dextrose  25 g Intravenous Once     doxercalciferol  0.5 mcg Intravenous Once in dialysis     doxycycline  100 mg Oral Q12H JEAN     fluticasone  1-2 spray Both Nostrils Daily     gelatin absorbable  1 each Topical During Hemodialysis (from stock)     heparin (porcine)  500 Units Hemodialysis Machine Once in dialysis     insulin aspart  1-10 Units Subcutaneous TID AC     insulin aspart  1-7 Units Subcutaneous At Bedtime     insulin glargine  10 Units Subcutaneous At Bedtime     omeprazole  20 mg Oral Daily     sodium chloride (PF)  3 mL Intracatheter Q8H     sodium chloride (PF)  3 mL Intracatheter During Hemodialysis (from stock)     sodium chloride (PF)  3 mL Intracatheter During Hemodialysis (from stock)       DOBUTamine 2.5 mcg/kg/min (04/21/18 0812)     heparin (porcine) 500 Units/hr (04/17/18 1711)     heparin (porcine)       Reason ACE/ARB/ARNI order not selected       Reason beta blocker order not selected       Kristi Armas PA-C

## 2018-04-21 NOTE — PLAN OF CARE
Problem: Patient Care Overview  Goal: Plan of Care/Patient Progress Review  Outcome: No Change  Shift Summary    Observed mostly asleep overnight. Always denied having pain, aches, discomfort whenever he was asked. Pleasant, cooperative, agreeable, and appreciative. Been breathing regular, unlabored and with equal chest expansion; no MEADE. Vital signs been stable. Dobutamine drip continues to infuse at 2.5 mcg/kg/min; PIV site WNL. Hasn't urinated nor did he have a BM this shift.

## 2018-04-22 ENCOUNTER — APPOINTMENT (OUTPATIENT)
Dept: GENERAL RADIOLOGY | Facility: CLINIC | Age: 63
DRG: 001 | End: 2018-04-22
Attending: NURSE PRACTITIONER
Payer: COMMERCIAL

## 2018-04-22 LAB
ANION GAP SERPL CALCULATED.3IONS-SCNC: 11 MMOL/L (ref 3–14)
BUN SERPL-MCNC: 20 MG/DL (ref 7–30)
CALCIUM SERPL-MCNC: 8.5 MG/DL (ref 8.5–10.1)
CHLORIDE SERPL-SCNC: 99 MMOL/L (ref 94–109)
CO2 SERPL-SCNC: 27 MMOL/L (ref 20–32)
CREAT SERPL-MCNC: 5.15 MG/DL (ref 0.66–1.25)
ERYTHROCYTE [DISTWIDTH] IN BLOOD BY AUTOMATED COUNT: 15.7 % (ref 10–15)
GFR SERPL CREATININE-BSD FRML MDRD: 11 ML/MIN/1.7M2
GLUCOSE BLDC GLUCOMTR-MCNC: 118 MG/DL (ref 70–99)
GLUCOSE BLDC GLUCOMTR-MCNC: 138 MG/DL (ref 70–99)
GLUCOSE BLDC GLUCOMTR-MCNC: 150 MG/DL (ref 70–99)
GLUCOSE BLDC GLUCOMTR-MCNC: 168 MG/DL (ref 70–99)
GLUCOSE BLDC GLUCOMTR-MCNC: 175 MG/DL (ref 70–99)
GLUCOSE BLDC GLUCOMTR-MCNC: 206 MG/DL (ref 70–99)
GLUCOSE SERPL-MCNC: 106 MG/DL (ref 70–99)
HCT VFR BLD AUTO: 36.3 % (ref 40–53)
HGB BLD-MCNC: 11.5 G/DL (ref 13.3–17.7)
MAGNESIUM SERPL-MCNC: 1.6 MG/DL (ref 1.6–2.3)
MCH RBC QN AUTO: 31.3 PG (ref 26.5–33)
MCHC RBC AUTO-ENTMCNC: 31.7 G/DL (ref 31.5–36.5)
MCV RBC AUTO: 99 FL (ref 78–100)
PLATELET # BLD AUTO: 192 10E9/L (ref 150–450)
POTASSIUM SERPL-SCNC: 4 MMOL/L (ref 3.4–5.3)
RBC # BLD AUTO: 3.68 10E12/L (ref 4.4–5.9)
SODIUM SERPL-SCNC: 137 MMOL/L (ref 133–144)
WBC # BLD AUTO: 4.4 10E9/L (ref 4–11)

## 2018-04-22 PROCEDURE — 40000982 ZZH STATISTICAL HAIKU-CANTO PHOTO

## 2018-04-22 PROCEDURE — 80048 BASIC METABOLIC PNL TOTAL CA: CPT | Performed by: STUDENT IN AN ORGANIZED HEALTH CARE EDUCATION/TRAINING PROGRAM

## 2018-04-22 PROCEDURE — 40000986 XR CHEST PORT 1 VW

## 2018-04-22 PROCEDURE — 25000128 H RX IP 250 OP 636: Performed by: NURSE PRACTITIONER

## 2018-04-22 PROCEDURE — 94640 AIRWAY INHALATION TREATMENT: CPT | Mod: 76

## 2018-04-22 PROCEDURE — 36415 COLL VENOUS BLD VENIPUNCTURE: CPT | Performed by: STUDENT IN AN ORGANIZED HEALTH CARE EDUCATION/TRAINING PROGRAM

## 2018-04-22 PROCEDURE — 25000132 ZZH RX MED GY IP 250 OP 250 PS 637: Mod: GY

## 2018-04-22 PROCEDURE — 25000132 ZZH RX MED GY IP 250 OP 250 PS 637: Mod: GY | Performed by: NURSE PRACTITIONER

## 2018-04-22 PROCEDURE — 99233 SBSQ HOSP IP/OBS HIGH 50: CPT | Performed by: INTERNAL MEDICINE

## 2018-04-22 PROCEDURE — 40000275 ZZH STATISTIC RCP TIME EA 10 MIN

## 2018-04-22 PROCEDURE — 00000146 ZZHCL STATISTIC GLUCOSE BY METER IP

## 2018-04-22 PROCEDURE — 36569 INSJ PICC 5 YR+ W/O IMAGING: CPT

## 2018-04-22 PROCEDURE — 40000141 ZZH STATISTIC PERIPHERAL IV START W/O US GUIDANCE

## 2018-04-22 PROCEDURE — 85027 COMPLETE CBC AUTOMATED: CPT | Performed by: STUDENT IN AN ORGANIZED HEALTH CARE EDUCATION/TRAINING PROGRAM

## 2018-04-22 PROCEDURE — A9270 NON-COVERED ITEM OR SERVICE: HCPCS | Mod: GY

## 2018-04-22 PROCEDURE — 25000125 ZZHC RX 250: Performed by: NURSE PRACTITIONER

## 2018-04-22 PROCEDURE — 25000132 ZZH RX MED GY IP 250 OP 250 PS 637: Mod: GY | Performed by: STUDENT IN AN ORGANIZED HEALTH CARE EDUCATION/TRAINING PROGRAM

## 2018-04-22 PROCEDURE — 21400006 ZZH R&B CCU INTERMEDIATE UMMC

## 2018-04-22 PROCEDURE — 83735 ASSAY OF MAGNESIUM: CPT | Performed by: STUDENT IN AN ORGANIZED HEALTH CARE EDUCATION/TRAINING PROGRAM

## 2018-04-22 PROCEDURE — 94640 AIRWAY INHALATION TREATMENT: CPT

## 2018-04-22 PROCEDURE — 27210195 ZZH KIT POWER PICC DOUBLE LUMEN

## 2018-04-22 PROCEDURE — A9270 NON-COVERED ITEM OR SERVICE: HCPCS | Mod: GY | Performed by: NURSE PRACTITIONER

## 2018-04-22 PROCEDURE — A9270 NON-COVERED ITEM OR SERVICE: HCPCS | Mod: GY | Performed by: STUDENT IN AN ORGANIZED HEALTH CARE EDUCATION/TRAINING PROGRAM

## 2018-04-22 RX ORDER — HEPARIN SODIUM,PORCINE 10 UNIT/ML
2-5 VIAL (ML) INTRAVENOUS
Status: DISCONTINUED | OUTPATIENT
Start: 2018-04-22 | End: 2018-06-15

## 2018-04-22 RX ORDER — LIDOCAINE 40 MG/G
CREAM TOPICAL
Status: DISCONTINUED | OUTPATIENT
Start: 2018-04-22 | End: 2018-05-24

## 2018-04-22 RX ADMIN — TRAMADOL HYDROCHLORIDE 50 MG: 50 TABLET, COATED ORAL at 21:48

## 2018-04-22 RX ADMIN — CALCIUM ACETATE 667 MG: 667 CAPSULE ORAL at 13:06

## 2018-04-22 RX ADMIN — CHLORHEXIDINE GLUCONATE 15 ML: 1.2 RINSE ORAL at 08:44

## 2018-04-22 RX ADMIN — DOXYCYCLINE HYCLATE 100 MG: 100 CAPSULE ORAL at 19:30

## 2018-04-22 RX ADMIN — ATORVASTATIN CALCIUM 40 MG: 40 TABLET, FILM COATED ORAL at 21:39

## 2018-04-22 RX ADMIN — FLUTICASONE PROPIONATE 2 SPRAY: 50 SPRAY, METERED NASAL at 21:39

## 2018-04-22 RX ADMIN — DOBUTAMINE HYDROCHLORIDE 2.5 MCG/KG/MIN: 400 INJECTION INTRAVENOUS at 13:11

## 2018-04-22 RX ADMIN — DOXYCYCLINE HYCLATE 100 MG: 100 CAPSULE ORAL at 08:37

## 2018-04-22 RX ADMIN — CALCIUM ACETATE 667 MG: 667 CAPSULE ORAL at 18:14

## 2018-04-22 RX ADMIN — ALLOPURINOL 100 MG: 100 TABLET ORAL at 08:37

## 2018-04-22 RX ADMIN — LIDOCAINE HYDROCHLORIDE 2 ML: 10 INJECTION, SOLUTION EPIDURAL; INFILTRATION; INTRACAUDAL; PERINEURAL at 10:26

## 2018-04-22 RX ADMIN — INSULIN GLARGINE 10 UNITS: 100 INJECTION, SOLUTION SUBCUTANEOUS at 21:50

## 2018-04-22 RX ADMIN — INSULIN ASPART 1 UNITS: 100 INJECTION, SOLUTION INTRAVENOUS; SUBCUTANEOUS at 14:35

## 2018-04-22 RX ADMIN — CALCIUM ACETATE 667 MG: 667 CAPSULE ORAL at 08:37

## 2018-04-22 RX ADMIN — CHLORHEXIDINE GLUCONATE 15 ML: 1.2 RINSE ORAL at 19:30

## 2018-04-22 RX ADMIN — SODIUM CHLORIDE 500 ML: 9 INJECTION, SOLUTION INTRAVENOUS at 08:39

## 2018-04-22 RX ADMIN — INSULIN ASPART 2 UNITS: 100 INJECTION, SOLUTION INTRAVENOUS; SUBCUTANEOUS at 18:40

## 2018-04-22 RX ADMIN — ALBUTEROL SULFATE 2.5 MG: 2.5 SOLUTION RESPIRATORY (INHALATION) at 21:49

## 2018-04-22 RX ADMIN — ASPIRIN 81 MG CHEWABLE TABLET 81 MG: 81 TABLET CHEWABLE at 21:38

## 2018-04-22 RX ADMIN — OMEPRAZOLE 20 MG: 20 CAPSULE, DELAYED RELEASE ORAL at 21:38

## 2018-04-22 NOTE — PLAN OF CARE
Problem: Patient Care Overview  Goal: Plan of Care/Patient Progress Review  D: Respiratory distress, work up for LVAD ,heart/kidney transplant  Cardiomyopathy-EF 15-20%    I: Monitored vitals and assessed pt status.   Running: Dobutamine gtt @ 2.5mcq/kg/min  PRN: none    A: A0x4. VSS (intermittent soft BP), on RA. Monitor STach. Denies c/o pain or SOB. Pt anuric. HS ST=429-9 units novolog. 0200 LF=762. Pt slept poorly due to activity with roommate.       Temp:  [97.6  F (36.4  C)-98.4  F (36.9  C)] 98.4  F (36.9  C)  Pulse:  [106-107] 106  Heart Rate:  [] 105  Resp:  [16-18] 16  BP: ()/(49-96) 98/68  Cuff Mean (mmHg):  [] 84  SpO2:  [99 %-100 %] 100 %      P: Continue to monitor Pt status and report changes to treatment team.

## 2018-04-22 NOTE — PROGRESS NOTES
Nephrology Progress Note  04/22/2018         Assessment & Recommendations:   Murray Nicholson is a 63 yr old male with PMH of HTN, DMII, functionally bicuspid aortic valve, CHF/CM (EF 10-15%), ESRD, admitted with worsening dyspnea/SOB.      ESKD: due to DMII, on PD since Aug 2017, changed to iHD 1/2018 due to polymicrobial and fungal peritonitis, now on TTS schedule at [facility] under care of Dr Mcpherson. Access: tunneled RIJ (replaced 4/17/18). Run time: 4 hrs; EDW 77 kg.  - Dialysis per TTS schedule   - Heparin lock CVC     Access: just had tunneled RIJ replaced on 3/22/18; however wasn't working well so replaced again 4/17/18.          Volume: 77 kg, recently (2/2018) challenged with significant improvement in PCW. RHC on 4/20 with PCW of 10, RA 1.  -  Given very low RA pressure, will reset EDW at 77 kg  - No UF yesterday, getting IVF today due to symptomatic hypotension  - Daily standing weights please     Severe AV and MR insufficiency:   BP/congestive CM (EF 10-15%); minimal CAD per angio on 2/8/17. Chronically hypotensive with tachycardia  - Goal MAP > 65 and SBP > 95 while dialyzing  - Now on dobutamine 2.5 mcg/kg/min (started 4/20/18)  - being evaluated for heart/kidney transplant, will likely remain inpatient awaiting transplant         Anemia: hgb 11.5; Recent labs with hgb in 11's, ferritin 371, IS 12, iron 36; on venofer 50 mg qTuesday, ANASTASIYA recently stopped.  - Continue venofer      BMD: calcium 8.5, alb 3.5, phos 5.4; recent ; on hectorol 0.5 mcg via HD; on phoslo 1 tab TID with meals.   - Continue hectorol via HD  - Continue Phoslo    Recommendations were communicated to primary team via this note.       [facility]  Division of Kidney Disease  [phone]    Interval History :   Seen bedside, dialyzed yesterday with no UF. Symptomatically hypotensive this AM, getting IVF. On dobutamine gtt. Heart tx eval being completed, will likely remain inpatient waiting for tx. Denies n/v, CP,  "chills.    Review of Systems:   4 point ROS negative other than as noted above.    Physical Exam:   I/O last 3 completed shifts:  In: 602.45 [P.O.:530; I.V.:72.45]  Out: 0    BP 94/64  Pulse 106  Temp 97.9  F (36.6  C) (Oral)  Resp 18  Ht 1.727 m (5' 8\")  Wt 75.4 kg (166 lb 4.8 oz)  SpO2 100%  BMI 25.29 kg/m2     GENERAL APPEARANCE: alert, NAD  EYES: no scleral icterus, pupils equal   Pulmonary: lungs clear to auscultation with equal breath sounds bilaterally   CV: regular rhythm, normal rate    - Edema none  GI: soft, nontender, normal bowel sounds  MS: no evidence of inflammation in joints, no muscle tenderness  : no doyle  SKIN: no rash, warm, dry, no cyanosis  NEURO: face symmetric, no asterixis   Access: tunneled RIJ       Labs:   All labs reviewed by me  Electrolytes/Renal -   Recent Labs   Lab Test  04/22/18   0644  04/21/18   0640  04/20/18   1847  04/20/18   0533   04/16/18   1546   02/12/18   0656   NA  137  135  134  134   < >  135   < >  136   POTASSIUM  4.0  4.0  4.4  4.1   < >  5.0   < >  4.5   CHLORIDE  99  96  94  95   < >  101   < >  103   CO2  27  24  28  28   < >  18*   < >  22   BUN  20  41*  35*  28   < >  63*   < >  37*   CR  5.15*  7.72*  6.69*  5.41*   < >  8.81*   < >  7.12*   GLC  106*  111*  159*  138*   < >  253*   < >  121*   TRINI  8.5  9.2  9.4  9.2   < >  9.6   < >  8.8   MAG  1.6  1.6   --   1.7   < >  1.9   --   1.9   PHOS   --   5.4*   --    --    --   6.4*   --   4.1    < > = values in this interval not displayed.       CBC -   Recent Labs   Lab Test  04/22/18   0644  04/21/18   0640  04/20/18   0533   WBC  4.4  4.9  4.7   HGB  11.5*  12.0*  12.3*   PLT  192  201  231       LFTs -   Recent Labs   Lab Test  04/16/18   1546  03/07/18   0833  02/19/18   1558  02/02/18   1736   ALKPHOS  123  129  102  86   BILITOTAL  0.8  0.7  0.6  0.4   ALT  22  21  15  16   AST  17  18  18  20   PROTTOTAL  7.4   --   7.2  6.2*   ALBUMIN  3.5   --   2.7*  2.1*       Iron Panel -   Recent Labs "   Lab Test  07/19/17   1306  07/05/17   1204  06/21/17   1058   IRON  46  26*  69   IRONSAT  18  12*  29   ALBERTINA  369  542*  557*         Imaging:  Reviewed    Current Medications:    sodium chloride 0.9%  500 mL Intravenous Once     allopurinol  100 mg Oral Daily     aspirin  81 mg Oral Daily     atorvastatin  40 mg Oral Daily     calcium acetate  667 mg Oral TID w/meals     chlorhexidine  15 mL Swish & Spit BID     dextrose  25 g Intravenous Once     doxycycline  100 mg Oral Q12H JEAN     fluticasone  1-2 spray Both Nostrils Daily     insulin aspart  1-10 Units Subcutaneous TID AC     insulin aspart  1-7 Units Subcutaneous At Bedtime     insulin glargine  10 Units Subcutaneous At Bedtime     omeprazole  20 mg Oral Daily     sodium chloride (PF)  3 mL Intracatheter Q8H       DOBUTamine 2.5 mcg/kg/min (04/22/18 0700)     heparin (porcine) 500 Units/hr (04/17/18 1711)     Reason ACE/ARB/ARNI order not selected       Reason beta blocker order not selected       Kristi Armas PA-C

## 2018-04-22 NOTE — PROGRESS NOTES
[facility]   Cardiology II Service / Advanced Heart Failure  Daily Progress Note  Date of Service: 4/22/2018      Patient: Murray Nicholson  MRN: [id number]  Admission Date: 4/16/2018  Hospital Day # 6    Assessment and Plan: Murray Nicholson is a 63 year old male DM Type II, SHEELA, GERD, HTN, Dyslipidemia, Severe AI, Severe MR, ESRD on HD, ICM with EF 15-20%. He presented per [facility] for decompensated heart failure.       ICM. 3/15/18 Echo notes EF 15-20%, mild-moderately reduced RV function, bicuspid AV with severe holodiastolic AI, and torn chordae to A3 P3 MR. NM Lexiscan 1/12/16 negative for ischemia. LHC 2/8/17 with no obstructive CAD. CPX 3/30/18 consistent with RER 1.23, VEVCO2 slope of 68, with FC of 31%. Secondary to ischemic and valvular etiology. RHC 4/20/18 mRA-1, mPA-27, mPCW-10, BECKA CO-3.17, and BECKA CI-1.68.   Stage D, NYHA Class IIIB  ACEi/ARB Held in setting of symptomatic hypotension.   BB contraindicated due to low cardiac output. Dobutamine 2.5 mcg/kg/min.   Aldosterone antagonist: Contraindicated due to renal dysfunction  SCD prophylaxis Defer pending LVAD workup.   Fluid status: Concern for hypovolemia, mRA-1 4/20.  ml bolus this AM.   Sleep Apnea: CPAP.   - PICC placed today for inotropes.       ESRD on HD. Hyperkalemia resolved. K 6.2 4/17 shifted with insulin given issue with tunneled line. S/p IR tunneled line exchange 4/17/18.  - Appreciate consult per Nephrology.   - HD today.   - Midodrine 10 mg po prior to dialysis.      Severe AI and MR.   - Appreciate Surgical consult, await committee presentation next week.       Right maxillary sinusitis. CT Head consistent with right maxillary sinusitis with current complaints of PND.   - Day #5/7 of Doxycycline 100 mg po BID.       Pancreatic mass. 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 GI curbside appreciated. Pseudocyt vs IPMN.   - Final Abdominal MRI pending. Radiology notified read  "pending 4/22.      Chronic Medical Conditions:  GERD. Prilosec.   DM Type II. Novolog increased to high intensity SSI. Lantus 10 units daily. BG stable.   AAA. 4.5 x 4.5 cm proximal ascending aortic aneurysm seen on MRI done 2/14. Recent echo 2/2/18 showed size of 4.2 cm.   CAD. BB contraindicated in low output state. ASA 81 mg po daily and Lipitor.       FEN: 2 gram NA diet   PROPHY: Prilosec  LINES: PIV  DISPO:  TBD.   CODE STATUS: Full Code  ================================================================  Interval History/ROS: He notes mild lightheadedness and dizziness this AM when sitting in the chair. He denies fever, chills, chest pain, palpitations, MEADE, PND, cough, nausea, vomiting, diarrhea, and LE edema. He is tolerating oral intake well.     Last 24 hr care team notes reviewed.   ROS:  4 point ROS including Respiratory, CV, GI and , other than that noted in the HPI, is negative.     Medications: Reviewed in EPIC.     Physical Exam:   BP 95/61  Pulse 106  Temp 97.9  F (36.6  C) (Oral)  Resp 18  Ht 1.727 m (5' 8\")  Wt 75.4 kg (166 lb 4.8 oz)  SpO2 100%  BMI 25.29 kg/m2  GENERAL: Appears alert and oriented times three.   HEENT: Eye symmetrical and free of discharge bilaterally. Mucous membranes moist and without lesions.  NECK: Supple and without lymphadenopathy. JVD below clavicular line upright.   CV: RRR, S1S2 present with III/VI murmur heard best at LSB.   RESPIRATORY: Respirations regular, even, and unlabored. Lungs CTA throughout.   GI: Soft and non distended with normoactive bowel sounds present in all quadrants. No tenderness, rebound, guarding. No organomegaly.   EXTREMITIES: No peripheral edema with warm extremities. 2+ bilateral pedal pulses.   NEUROLOGIC: Alert and orientated x 3. CN II-XII grossly intact. No focal deficits.   MUSCULOSKELETAL: No joint swelling or tenderness.   SKIN: No jaundice. No rashes or lesions.     Data:  CMP  Recent Labs  Lab 04/22/18  0644 04/21/18  0640 " 04/20/18  1847 04/20/18  0533 04/19/18  0630  04/16/18  1546    135 134 134 137  < > 135   POTASSIUM 4.0 4.0 4.4 4.1 4.5  < > 5.0   CHLORIDE 99 96 94 95 99  < > 101   CO2 27 24 28 28 24  < > 18*   ANIONGAP 11 15* 10 11 14  < > 15*   * 111* 159* 138* 145*  < > 253*   BUN 20 41* 35* 28 47*  < > 63*   CR 5.15* 7.72* 6.69* 5.41* 7.95*  < > 8.81*   GFRESTIMATED 11* 7* 8* 11* 7*  < > 6*   GFRESTBLACK 14* 9* 10* 13* 8*  < > 7*   TRINI 8.5 9.2 9.4 9.2 9.2  < > 9.6   MAG 1.6 1.6  --  1.7 1.6  < > 1.9   PHOS  --  5.4*  --   --   --   --  6.4*   PROTTOTAL  --   --   --   --   --   --  7.4   ALBUMIN  --   --   --   --   --   --  3.5   BILITOTAL  --   --   --   --   --   --  0.8   ALKPHOS  --   --   --   --   --   --  123   AST  --   --   --   --   --   --  17   ALT  --   --   --   --   --   --  22   < > = values in this interval not displayed.  CBC  Recent Labs  Lab 04/22/18  0644 04/21/18  0640 04/20/18  0533 04/19/18  0630   WBC 4.4 4.9 4.7 4.8   RBC 3.68* 3.83* 3.85* 3.69*   HGB 11.5* 12.0* 12.3* 11.6*   HCT 36.3* 37.7* 37.9* 37.0*   MCV 99 98 98 100   MCH 31.3 31.3 31.9 31.4   MCHC 31.7 31.8 32.5 31.4*   RDW 15.7* 15.8* 15.9* 16.1*    201 231 199     INR  Recent Labs  Lab 04/16/18  1546   INR 1.09       Patient seen and discussed with Dr. Minaya.      Jennifer Dean FNP  4/22/2018

## 2018-04-22 NOTE — PROCEDURES
FINAL CARDIAC CATH REPORT:     PROCEDURES PERFORMED:   Right Heart Catheterization    PHYSICIANS:  Attending Physician: Montrell Posada MD  Interventional Cardiology Fellow: None  Cardiology Fellow: None    INDICATION:  Murray Nicholson is a 63 year old male DM Type II, SHEELA, GERD, HTN, Dyslipidemia, Severe AI, Severe MR, ESRD on HD, ICM with EF 15-20%.  He was referred for elective RHC.    DESCRIPTION:  1. Consent obtained with discussion of risks.  All questions were answered.  2. Sterile prep and procedure.  3. Location with Sheaths:   Rt IJ  7 Fr 10 cm [short]  4. Access: Local anesthetic with lidocaine.  A standard 18 guage needle with ultrasound guidance was used to establish vascular access using a modified Seldinger technique.  5. Diagnostic Catheters:   7 Fr  Hollister Jax  6. Guiding Catheters:  None  6. Estimated blood loss: < 5 ml    MEDICATIONS:  The procedure was performed without conscious sedation.  Heart rate, BP, respiration, oxygen saturation and patient responses were monitored throughout the procedure with the assistance of the RN under my supervision.    Procedures:    HEMODYNAMICS:  BSA 1.9  1. HR 97 bpm  2. BP 96/63 MAP 74 mmHg  3. RA 2, 1, 1   4. RV 33/2  5. PA 33/22, mean 27 mm Hg   6. PCW 11, 11, 10  7. PA sat 54%   8. PCW sat 97.3%  9. Hgb 12.2 g/dL   10. Kassi CO 3.1  11. Kassi CI  1.63   12. TD CO 2.3  13. TD CI  1.21  14. PVR 5.3    Sheath Removal:  The RT IJ sheath was manually removed in the cardiac catheterization laboratory.    Contrast: Isovue, 0 ml     Fluoroscopy Time: 0.2 min    COMPLICATIONS:  1. None    SUMMARY:   >> Low right sided filling pressures.  >> Normal left sided filling pressures.  >> Mild pulmonary artery hypertension   >> Reduced cardiac output, 3.1 L/min with index 1.6 L/min/m2    PLAN:   >> Continued medical management and lifestyle modification for cardiovascular risk factor optimization.   >> Discharge today per protocol    The attending interventional cardiologist was  present and supervised all critical aspects the procedure.    See CVIS report for final draft.    Montrell Posada MD  Cardiology Staff

## 2018-04-22 NOTE — PLAN OF CARE
Problem: Patient Care Overview  Goal: Plan of Care/Patient Progress Review      Upon initial assessment at 0800, pt found to have low BPs, lowest 70/47 MAP 52 while sitting in chair.  Provider Jennifer Dean NP notified, presented to assess. Pt returned to bed, BP improved to 84/62 MAP 68.  L lower forearm dobutamine PIV infusion site assessed to have extravasation; site reddened, edema, pt denied pain. Infusion stopped, vascular access nurse to bedside to place additional PIVs for dobutamine and NS bolus. Advised to apply cold pack, PIV d/c'd.  Site marked. Dobutamine noted to be a vesicant according to policy. BPs continued to vary, see flowsheet. PICC line to be placed at bedside. Vascular access consult order placed for extravasation. Assess for changes.

## 2018-04-22 NOTE — PLAN OF CARE
Problem: Patient Care Overview  Goal: Plan of Care/Patient Progress Review  Pt BPs improved following 500cc NS bolus today, see flowsheet.  HR stable, sinus tach.  PICC line placed at bedside, ok'd to use by LEWIS Dean, dobutamine infusing.  L forearm IV extravasation site marked, assessed by vascular access team. Advised by vascular access to do ice packs x15 minutes 4 times/day. See flowsheet for assessments.  Pt up independently in room, advised to be cautious if feeling dizzy.  Good appetite, FSBG checks done.  Noted his bilateral big toe nails are thickened and possible fungal appearance to NP on assessment this morning.  Monitor for changes, continue plan of care.

## 2018-04-23 LAB
ANION GAP SERPL CALCULATED.3IONS-SCNC: 9 MMOL/L (ref 3–14)
BUN SERPL-MCNC: 31 MG/DL (ref 7–30)
CALCIUM SERPL-MCNC: 9 MG/DL (ref 8.5–10.1)
CHLORIDE SERPL-SCNC: 100 MMOL/L (ref 94–109)
CO2 SERPL-SCNC: 28 MMOL/L (ref 20–32)
CREAT SERPL-MCNC: 7.15 MG/DL (ref 0.66–1.25)
ERYTHROCYTE [DISTWIDTH] IN BLOOD BY AUTOMATED COUNT: 15.4 % (ref 10–15)
GFR SERPL CREATININE-BSD FRML MDRD: 8 ML/MIN/1.7M2
GLUCOSE BLDC GLUCOMTR-MCNC: 121 MG/DL (ref 70–99)
GLUCOSE BLDC GLUCOMTR-MCNC: 186 MG/DL (ref 70–99)
GLUCOSE BLDC GLUCOMTR-MCNC: 196 MG/DL (ref 70–99)
GLUCOSE BLDC GLUCOMTR-MCNC: 203 MG/DL (ref 70–99)
GLUCOSE SERPL-MCNC: 85 MG/DL (ref 70–99)
HCT VFR BLD AUTO: 35.9 % (ref 40–53)
HGB BLD-MCNC: 11 G/DL (ref 13.3–17.7)
MAGNESIUM SERPL-MCNC: 1.6 MG/DL (ref 1.6–2.3)
MCH RBC QN AUTO: 30.6 PG (ref 26.5–33)
MCHC RBC AUTO-ENTMCNC: 30.6 G/DL (ref 31.5–36.5)
MCV RBC AUTO: 100 FL (ref 78–100)
PLATELET # BLD AUTO: 193 10E9/L (ref 150–450)
POTASSIUM SERPL-SCNC: 4.4 MMOL/L (ref 3.4–5.3)
RBC # BLD AUTO: 3.6 10E12/L (ref 4.4–5.9)
SODIUM SERPL-SCNC: 138 MMOL/L (ref 133–144)
WBC # BLD AUTO: 4.3 10E9/L (ref 4–11)

## 2018-04-23 PROCEDURE — 83735 ASSAY OF MAGNESIUM: CPT | Performed by: STUDENT IN AN ORGANIZED HEALTH CARE EDUCATION/TRAINING PROGRAM

## 2018-04-23 PROCEDURE — 00000146 ZZHCL STATISTIC GLUCOSE BY METER IP

## 2018-04-23 PROCEDURE — 21400006 ZZH R&B CCU INTERMEDIATE UMMC

## 2018-04-23 PROCEDURE — 25000132 ZZH RX MED GY IP 250 OP 250 PS 637: Mod: GY | Performed by: STUDENT IN AN ORGANIZED HEALTH CARE EDUCATION/TRAINING PROGRAM

## 2018-04-23 PROCEDURE — A9270 NON-COVERED ITEM OR SERVICE: HCPCS | Mod: GY

## 2018-04-23 PROCEDURE — 25000132 ZZH RX MED GY IP 250 OP 250 PS 637: Performed by: NURSE PRACTITIONER

## 2018-04-23 PROCEDURE — 80048 BASIC METABOLIC PNL TOTAL CA: CPT | Performed by: STUDENT IN AN ORGANIZED HEALTH CARE EDUCATION/TRAINING PROGRAM

## 2018-04-23 PROCEDURE — 99233 SBSQ HOSP IP/OBS HIGH 50: CPT | Performed by: NURSE PRACTITIONER

## 2018-04-23 PROCEDURE — A9270 NON-COVERED ITEM OR SERVICE: HCPCS | Mod: GY | Performed by: NURSE PRACTITIONER

## 2018-04-23 PROCEDURE — 25000132 ZZH RX MED GY IP 250 OP 250 PS 637: Mod: GY

## 2018-04-23 PROCEDURE — 40000982 ZZH STATISTICAL HAIKU-CANTO PHOTO

## 2018-04-23 PROCEDURE — 85027 COMPLETE CBC AUTOMATED: CPT | Performed by: STUDENT IN AN ORGANIZED HEALTH CARE EDUCATION/TRAINING PROGRAM

## 2018-04-23 PROCEDURE — 40000802 ZZH SITE CHECK

## 2018-04-23 PROCEDURE — A9270 NON-COVERED ITEM OR SERVICE: HCPCS | Mod: GY | Performed by: STUDENT IN AN ORGANIZED HEALTH CARE EDUCATION/TRAINING PROGRAM

## 2018-04-23 RX ADMIN — INSULIN ASPART 3 UNITS: 100 INJECTION, SOLUTION INTRAVENOUS; SUBCUTANEOUS at 11:52

## 2018-04-23 RX ADMIN — CHLORHEXIDINE GLUCONATE 15 ML: 1.2 RINSE ORAL at 20:35

## 2018-04-23 RX ADMIN — FLUTICASONE PROPIONATE 2 SPRAY: 50 SPRAY, METERED NASAL at 20:40

## 2018-04-23 RX ADMIN — OMEPRAZOLE 20 MG: 20 CAPSULE, DELAYED RELEASE ORAL at 20:34

## 2018-04-23 RX ADMIN — ATORVASTATIN CALCIUM 40 MG: 40 TABLET, FILM COATED ORAL at 20:35

## 2018-04-23 RX ADMIN — CALCIUM ACETATE 667 MG: 667 CAPSULE ORAL at 18:37

## 2018-04-23 RX ADMIN — DOXYCYCLINE HYCLATE 100 MG: 100 CAPSULE ORAL at 07:56

## 2018-04-23 RX ADMIN — ASPIRIN 81 MG CHEWABLE TABLET 81 MG: 81 TABLET CHEWABLE at 20:34

## 2018-04-23 RX ADMIN — CALCIUM ACETATE 667 MG: 667 CAPSULE ORAL at 07:56

## 2018-04-23 RX ADMIN — CHLORHEXIDINE GLUCONATE 15 ML: 1.2 RINSE ORAL at 07:57

## 2018-04-23 RX ADMIN — INSULIN GLARGINE 10 UNITS: 100 INJECTION, SOLUTION SUBCUTANEOUS at 21:28

## 2018-04-23 RX ADMIN — ALBUTEROL SULFATE 2 PUFF: 90 AEROSOL, METERED RESPIRATORY (INHALATION) at 21:29

## 2018-04-23 RX ADMIN — CALCIUM ACETATE 667 MG: 667 CAPSULE ORAL at 11:52

## 2018-04-23 RX ADMIN — ALLOPURINOL 100 MG: 100 TABLET ORAL at 07:56

## 2018-04-23 RX ADMIN — DOXYCYCLINE HYCLATE 100 MG: 100 CAPSULE ORAL at 20:35

## 2018-04-23 RX ADMIN — INSULIN ASPART 3 UNITS: 100 INJECTION, SOLUTION INTRAVENOUS; SUBCUTANEOUS at 18:37

## 2018-04-23 NOTE — PLAN OF CARE
Problem: Patient Care Overview  Goal: Plan of Care/Patient Progress Review  Outcome: No Change  D: Ischemic Cardiomyopathy.  I/A: VSS. Denies pain. Sinus tachycardia. Dobutamine drip continues at 2.5 mcg/kg/min. Dialysis Tuesday/thursday/saturday. Pt sleeping better this shift.  P: Continue to monitor.

## 2018-04-23 NOTE — PROGRESS NOTES
Transplant Social Work Services Progress Note      Date of Initial Social Work Evaluation: 4/16/2018  Collaborated with: Pt.     Data: Pt is awaiting a heart and kidney transplant, and will remain inpatient until his transplant. SW met with to check in and see if pt had any SW needs. Pt inquired about completing a HCD, SW provided him with an honoring [employer] long form. SW informed him that once it is completed we could call a notary to notarize it for him. Pt also asked about a living will. SW provided him with instructions on how to write a living will on his own, otherwise advised pt that it can be expensive to hire a .   Pt also wondering what to do about his apartment since he will remain in the hospital until his transplant. SW offered that he should look into having it sublet. Pt is leaning towards moving his things out and giving up his lease so he can save money. He states he can stay with one of his sons after transplant and they will be his caregivers. After he has recovered from transplant he can find an apartment.   Pt also plans to call social security. Was unsure about doing this today at it was already the afternoon, SW assured him he still had time to do this today if he chooses.   Also checked in with pt to see how he was doing mentally with being in the hospital and having to remain here until transplant. Pt is feeling fine about this, and states he isn't doing anything different than what he was doing at home. States he is physically unable to do much nowadays.  Intervention: Met with pt to provide supportive check in.  Assessment: Pt seemed to be in good spirits. Wanting to get his affairs in order before transplant. Receptive to SW visit and asking good questions.  Education provided by SW: Honoring choices, living will, Social security.  Plan:    Discharge Plans in Progress: None.    Barriers to d/c plan: Transplant/medical stability.    Follow up Plan: SW will continue to follow while  pt is admitted.    Mercedez Escobedo [facility]- Transplant SW  Pager: [phone]

## 2018-04-23 NOTE — PLAN OF CARE
Problem: Patient Care Overview  Goal: Plan of Care/Patient Progress Review  Outcome: No Change  Pt with HF continues on Dobutamine drip. L arm extravasation treated with cold packs. To have dialysis next on Tuesday.VSS. SR/ST 90s-100s with BBB.Tramadol for R shoulder pain. Neb at [facility].

## 2018-04-23 NOTE — PROGRESS NOTES
Transplant Admission Psychosocial Assessment    Patient Name: Murray Nicholson  : 1955  Age: 63 year old  MRN: [id number]  Date of Initial Social Work Evaluation: 18    Patient admitted on 18. He is likely going to remain inpatient waiting for heart/kidney transplant.  Met with Murray to update psychosocial assessment and provide education about SW role while inpatient, and to begin discussion of expectations/requirements, caregiver needs and follow up needs post-transplant.     Presenting Information   Living Situation: Currently Murray lives in an apartment alone. He's thinking about giving up his apartment if his stay is going to be long.   If not local, plans for short term stay:  n/a  Previous Functional Status: independent, but has been struggling more lately due to fatigue and illness  Cultural/Language/Spiritual Considerations: none ntoed    Support System  Primary Support Person sons  Other support:  friend  Plan for support in immediate post-transplant period: He's still working out a plan. He learned on Monday the need for caregiver    Health Care Directive  Decision Maker: self  Alternate Decision Maker: Son- Jasper then son- Poornima  Health Care Directive: Copy in Chart    Mental Health/Coping:   History of Mental Health: None  History of Chemical Health: None  Current status: stable  Coping: Murray is still coming to terms with long hospitalization.   Services Needed/Recommended: none at this time    Financial   Income: ST Disability through work. He has LT disability available and has started SSDI application.   Impact of transplant on income: unable to work at this time.   Insurance and medication coverage: Currently he has   Financial concerns: none at this time, but he may need some assistance with disability paperwork  Resources needed: none at this time    Education provided by SW: Social Work role inpatient setting, availability of support groups, parking information and coping with long  hospitalization.     Assessment and recommendations and plan:    Murray appears to understand the need to remain hospitalized for heart/kidney transplant. He denies needs at this time; but enjoys talking and is open to SW visits.    SW will continue to follow as needed for support.    Pager [phone]

## 2018-04-23 NOTE — PROGRESS NOTES
[facility]   Cardiology II Service / Advanced Heart Failure  Daily Progress Note  Date of Service: 4/23/2018      Patient: Murray Nicholson  MRN: [id number]  Admission Date: 4/16/2018  Hospital Day # 7    Assessment and Plan: Murray is a 63 year old male with a PMH of CAD, ICM (EF of 15-20%), ESRD, CKD Stage V now on hemodialysis (previously on peritoneal dialysis with peritonitis in January 2018), DM II, HTN, dyslipidemia, bicuspid aortic valve, severe MR, and proximal AAA who was admitted from the [facility] for decompensated HF. Will be presented this Friday at Heart transplant and Renal conference to determine whether to list vs surgically fix valves with LVAD backup.    Chronic systolic heart failure secondary to ICM. Stage D  NYHA Class IIIB   ACEi/ARB: No deferred secondary to hypotension with previous attempts.  BB: No, deferred secondary to hypotension   Aldosterone antagonist: contraindicated due to renal dysfunction  SCD prophylaxis: decision deferred during medication uptitration  Fluid status: Euvolemic.  Anticoagulation: None.   Antiplatelet:  ASA 81 mg daily.   Sleep Apnea:  Doesn't tolerate CPAP.   Not using.    Right maxillary Sinusitis.  CT of head: Right maxillary sinusitis  -Continue Doxycycline 100 mg twice daily for 7 days.  Day 5/7.     ESRD:  Currently getting dialysis on T, Th, Sa   -Nephrology following.  -Appreciate recs.     Chronic Medical Issues:  CAD:  Coronary angiogram 2/8/17 showed no obstructive CAD.  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  Continue ASA 81 mg daily and Atorvastatin 40 mg daily.   AS and MR:  Being considered for AVR and MVR with LVAD backup. CVTS to be consulted on admission and will follow.    Ascending aortic aneurysm:  4.5 x 4.5 cm proximal ascending aortic aneurysm seen on MRI done 2/14. Recent echo done 2/2/18 showed size of 4.2 cm.      DM:  BG .        FEN: 2 gm sodium diet  PROPHY:  Ambulation  LINES:  PIV  DISPO:  " TBD based on hospital course  CODE STATUS:  Full  ================================================================    Interval History/ROS: Murray is feeling well.  He has had no further episodes of shortness of breath and tachycardia.  Gets MEADE with minimal exertion like moving around his room.  Denies SOB at rest, PND, orthopnea, edema, chest pain, palpitations, lightheadedness, dizziness, near syncopal/syncopal episodes.  Complains of \"cramping\" in left arch and in heel.  Describes as a cramping sensation that is more prominent when he wakes up. No other concerns today. Would like toenails clipped today.        Last 24 hr care team notes reviewed.   ROS:  4 point ROS including Respiratory, CV, GI and , other than that noted in the HPI, is negative.     Medications: Reviewed in EPIC.     Physical Exam:   /76 (BP Location: Right arm)  Pulse 106  Temp 98  F (36.7  C) (Oral)  Resp 18  Ht 1.727 m (5' 8\")  Wt 76.2 kg (168 lb)  SpO2 98%  BMI 25.54 kg/m2  GENERAL: Appears alert and oriented times three.   HEENT: EOMI. Mucous membranes moist and without lesions.  NECK: Supple and without lymphadenopathy. JVD just above clavicle.    CV: Tachycardia, S1S2 present without murmur, rub, or gallop.   RESPIRATORY: Respirations regular, even, and unlabored. Lungs CTA throughout.   GI: Soft and non distended with normoactive bowel sounds present in all quadrants. No tenderness, rebound, guarding. No organomegaly.   EXTREMITIES: No peripheral edema. 2+ bilateral pedal pulses.   NEUROLOGIC: Alert and orientated x 3. CN II-XII grossly intact. No focal deficits.   MUSCULOSKELETAL: Left foot not painful with mild palpation.  No increased warmth or erythema.   SKIN: No jaundice. No rashes, lesions, or cyanosis. Toenails thick.  Left forearm examined.  Firm, nontender.  IV infiltration has not gone past markings.    Data:  CMP  Recent Labs  Lab 04/22/18  0644 04/21/18  0640 04/20/18  1847 04/20/18  0533 04/19/18  0630  " 04/16/18  1546    135 134 134 137  < > 135   POTASSIUM 4.0 4.0 4.4 4.1 4.5  < > 5.0   CHLORIDE 99 96 94 95 99  < > 101   CO2 27 24 28 28 24  < > 18*   ANIONGAP 11 15* 10 11 14  < > 15*   * 111* 159* 138* 145*  < > 253*   BUN 20 41* 35* 28 47*  < > 63*   CR 5.15* 7.72* 6.69* 5.41* 7.95*  < > 8.81*   GFRESTIMATED 11* 7* 8* 11* 7*  < > 6*   GFRESTBLACK 14* 9* 10* 13* 8*  < > 7*   TRINI 8.5 9.2 9.4 9.2 9.2  < > 9.6   MAG 1.6 1.6  --  1.7 1.6  < > 1.9   PHOS  --  5.4*  --   --   --   --  6.4*   PROTTOTAL  --   --   --   --   --   --  7.4   ALBUMIN  --   --   --   --   --   --  3.5   BILITOTAL  --   --   --   --   --   --  0.8   ALKPHOS  --   --   --   --   --   --  123   AST  --   --   --   --   --   --  17   ALT  --   --   --   --   --   --  22   < > = values in this interval not displayed.  CBC  Recent Labs  Lab 04/23/18  0650 04/22/18  0644 04/21/18  0640 04/20/18  0533   WBC 4.3 4.4 4.9 4.7   RBC 3.60* 3.68* 3.83* 3.85*   HGB 11.0* 11.5* 12.0* 12.3*   HCT 35.9* 36.3* 37.7* 37.9*    99 98 98   MCH 30.6 31.3 31.3 31.9   MCHC 30.6* 31.7 31.8 32.5   RDW 15.4* 15.7* 15.8* 15.9*    192 201 231     INR  Recent Labs  Lab 04/16/18  1546   INR 1.09         Patient seen and discussed with Dr. Minaya.      Kristi MARTIN, CNP  [phone]  Cards II Service  4/23/2018

## 2018-04-24 LAB
ANION GAP SERPL CALCULATED.3IONS-SCNC: 10 MMOL/L (ref 3–14)
BUN SERPL-MCNC: 38 MG/DL (ref 7–30)
CALCIUM SERPL-MCNC: 8.7 MG/DL (ref 8.5–10.1)
CHLORIDE SERPL-SCNC: 98 MMOL/L (ref 94–109)
CO2 SERPL-SCNC: 29 MMOL/L (ref 20–32)
CREAT SERPL-MCNC: 8.76 MG/DL (ref 0.66–1.25)
ERYTHROCYTE [DISTWIDTH] IN BLOOD BY AUTOMATED COUNT: 15.7 % (ref 10–15)
GFR SERPL CREATININE-BSD FRML MDRD: 6 ML/MIN/1.7M2
GLUCOSE BLDC GLUCOMTR-MCNC: 102 MG/DL (ref 70–99)
GLUCOSE BLDC GLUCOMTR-MCNC: 109 MG/DL (ref 70–99)
GLUCOSE BLDC GLUCOMTR-MCNC: 191 MG/DL (ref 70–99)
GLUCOSE BLDC GLUCOMTR-MCNC: 206 MG/DL (ref 70–99)
GLUCOSE SERPL-MCNC: 101 MG/DL (ref 70–99)
HCT VFR BLD AUTO: 34.4 % (ref 40–53)
HGB BLD-MCNC: 11 G/DL (ref 13.3–17.7)
MAGNESIUM SERPL-MCNC: 1.4 MG/DL (ref 1.6–2.3)
MAGNESIUM SERPL-MCNC: 1.7 MG/DL (ref 1.6–2.3)
MCH RBC QN AUTO: 31.5 PG (ref 26.5–33)
MCHC RBC AUTO-ENTMCNC: 32 G/DL (ref 31.5–36.5)
MCV RBC AUTO: 99 FL (ref 78–100)
PLATELET # BLD AUTO: 194 10E9/L (ref 150–450)
POTASSIUM SERPL-SCNC: 4.5 MMOL/L (ref 3.4–5.3)
RBC # BLD AUTO: 3.49 10E12/L (ref 4.4–5.9)
SODIUM SERPL-SCNC: 137 MMOL/L (ref 133–144)
URATE SERPL-MCNC: 5.8 MG/DL (ref 3.5–7.2)
WBC # BLD AUTO: 4.6 10E9/L (ref 4–11)

## 2018-04-24 PROCEDURE — A9270 NON-COVERED ITEM OR SERVICE: HCPCS | Mod: GY | Performed by: NURSE PRACTITIONER

## 2018-04-24 PROCEDURE — 40000133 ZZH STATISTIC OT WARD VISIT: Performed by: OCCUPATIONAL THERAPIST

## 2018-04-24 PROCEDURE — 40000982 ZZH STATISTICAL HAIKU-CANTO PHOTO

## 2018-04-24 PROCEDURE — 84550 ASSAY OF BLOOD/URIC ACID: CPT | Performed by: INTERNAL MEDICINE

## 2018-04-24 PROCEDURE — A9270 NON-COVERED ITEM OR SERVICE: HCPCS | Mod: GY

## 2018-04-24 PROCEDURE — 99233 SBSQ HOSP IP/OBS HIGH 50: CPT | Performed by: NURSE PRACTITIONER

## 2018-04-24 PROCEDURE — 25000128 H RX IP 250 OP 636: Performed by: NURSE PRACTITIONER

## 2018-04-24 PROCEDURE — 25000125 ZZHC RX 250: Performed by: NURSE PRACTITIONER

## 2018-04-24 PROCEDURE — 00000146 ZZHCL STATISTIC GLUCOSE BY METER IP

## 2018-04-24 PROCEDURE — 25000132 ZZH RX MED GY IP 250 OP 250 PS 637

## 2018-04-24 PROCEDURE — A9270 NON-COVERED ITEM OR SERVICE: HCPCS | Mod: GY | Performed by: STUDENT IN AN ORGANIZED HEALTH CARE EDUCATION/TRAINING PROGRAM

## 2018-04-24 PROCEDURE — 90937 HEMODIALYSIS REPEATED EVAL: CPT

## 2018-04-24 PROCEDURE — 83735 ASSAY OF MAGNESIUM: CPT | Performed by: INTERNAL MEDICINE

## 2018-04-24 PROCEDURE — 25000132 ZZH RX MED GY IP 250 OP 250 PS 637: Mod: GY | Performed by: STUDENT IN AN ORGANIZED HEALTH CARE EDUCATION/TRAINING PROGRAM

## 2018-04-24 PROCEDURE — 94640 AIRWAY INHALATION TREATMENT: CPT

## 2018-04-24 PROCEDURE — 85027 COMPLETE CBC AUTOMATED: CPT | Performed by: INTERNAL MEDICINE

## 2018-04-24 PROCEDURE — 80048 BASIC METABOLIC PNL TOTAL CA: CPT | Performed by: INTERNAL MEDICINE

## 2018-04-24 PROCEDURE — 25000132 ZZH RX MED GY IP 250 OP 250 PS 637: Mod: GY | Performed by: NURSE PRACTITIONER

## 2018-04-24 PROCEDURE — 25000128 H RX IP 250 OP 636: Performed by: INTERNAL MEDICINE

## 2018-04-24 PROCEDURE — 21400006 ZZH R&B CCU INTERMEDIATE UMMC

## 2018-04-24 PROCEDURE — 97110 THERAPEUTIC EXERCISES: CPT | Mod: GO | Performed by: OCCUPATIONAL THERAPIST

## 2018-04-24 PROCEDURE — 40000275 ZZH STATISTIC RCP TIME EA 10 MIN

## 2018-04-24 RX ORDER — DOXERCALCIFEROL 4 UG/2ML
0.5 INJECTION INTRAVENOUS
Status: COMPLETED | OUTPATIENT
Start: 2018-04-24 | End: 2018-04-24

## 2018-04-24 RX ORDER — MAGNESIUM SULFATE HEPTAHYDRATE 40 MG/ML
4 INJECTION, SOLUTION INTRAVENOUS EVERY 4 HOURS PRN
Status: DISCONTINUED | OUTPATIENT
Start: 2018-04-24 | End: 2018-04-26

## 2018-04-24 RX ORDER — HEPARIN SODIUM 1000 [USP'U]/ML
500 INJECTION, SOLUTION INTRAVENOUS; SUBCUTANEOUS
Status: COMPLETED | OUTPATIENT
Start: 2018-04-24 | End: 2018-04-24

## 2018-04-24 RX ORDER — HEPARIN SODIUM 1000 [USP'U]/ML
500 INJECTION, SOLUTION INTRAVENOUS; SUBCUTANEOUS CONTINUOUS
Status: DISCONTINUED | OUTPATIENT
Start: 2018-04-24 | End: 2018-04-24

## 2018-04-24 RX ADMIN — INSULIN ASPART 3 UNITS: 100 INJECTION, SOLUTION INTRAVENOUS; SUBCUTANEOUS at 12:11

## 2018-04-24 RX ADMIN — HEPARIN SODIUM 2100 UNITS: 1000 INJECTION, SOLUTION INTRAVENOUS; SUBCUTANEOUS at 15:24

## 2018-04-24 RX ADMIN — DOXYCYCLINE HYCLATE 100 MG: 100 CAPSULE ORAL at 08:20

## 2018-04-24 RX ADMIN — ATORVASTATIN CALCIUM 40 MG: 40 TABLET, FILM COATED ORAL at 21:44

## 2018-04-24 RX ADMIN — CALCIUM ACETATE 667 MG: 667 CAPSULE ORAL at 18:11

## 2018-04-24 RX ADMIN — FLUTICASONE PROPIONATE 2 SPRAY: 50 SPRAY, METERED NASAL at 22:09

## 2018-04-24 RX ADMIN — CALCIUM ACETATE 667 MG: 667 CAPSULE ORAL at 12:23

## 2018-04-24 RX ADMIN — OMEPRAZOLE 20 MG: 20 CAPSULE, DELAYED RELEASE ORAL at 22:09

## 2018-04-24 RX ADMIN — CALCIUM ACETATE 667 MG: 667 CAPSULE ORAL at 08:20

## 2018-04-24 RX ADMIN — DOBUTAMINE HYDROCHLORIDE 2.5 MCG/KG/MIN: 400 INJECTION INTRAVENOUS at 10:50

## 2018-04-24 RX ADMIN — SODIUM CHLORIDE 300 ML: 9 INJECTION, SOLUTION INTRAVENOUS at 09:59

## 2018-04-24 RX ADMIN — DOXERCALCIFEROL 0.5 MCG: 4 INJECTION, SOLUTION INTRAVENOUS at 10:05

## 2018-04-24 RX ADMIN — Medication 1 CAPSULE: at 18:11

## 2018-04-24 RX ADMIN — SODIUM CHLORIDE 250 ML: 9 INJECTION, SOLUTION INTRAVENOUS at 09:59

## 2018-04-24 RX ADMIN — HEPARIN SODIUM 500 UNITS: 1000 INJECTION, SOLUTION INTRAVENOUS; SUBCUTANEOUS at 09:59

## 2018-04-24 RX ADMIN — ASPIRIN 81 MG CHEWABLE TABLET 81 MG: 81 TABLET CHEWABLE at 20:45

## 2018-04-24 RX ADMIN — CHLORHEXIDINE GLUCONATE 15 ML: 1.2 RINSE ORAL at 20:31

## 2018-04-24 RX ADMIN — HEPARIN SODIUM 2100 UNITS: 1000 INJECTION, SOLUTION INTRAVENOUS; SUBCUTANEOUS at 15:23

## 2018-04-24 RX ADMIN — IRON SUCROSE 50 MG: 20 INJECTION, SOLUTION INTRAVENOUS at 10:06

## 2018-04-24 RX ADMIN — DOXYCYCLINE HYCLATE 100 MG: 100 CAPSULE ORAL at 20:19

## 2018-04-24 RX ADMIN — CHLORHEXIDINE GLUCONATE 15 ML: 1.2 RINSE ORAL at 08:21

## 2018-04-24 RX ADMIN — ALBUTEROL SULFATE 2.5 MG: 2.5 SOLUTION RESPIRATORY (INHALATION) at 22:07

## 2018-04-24 RX ADMIN — INSULIN GLARGINE 10 UNITS: 100 INJECTION, SOLUTION SUBCUTANEOUS at 22:15

## 2018-04-24 RX ADMIN — Medication 2 G: at 14:43

## 2018-04-24 RX ADMIN — ALLOPURINOL 100 MG: 100 TABLET ORAL at 08:20

## 2018-04-24 RX ADMIN — MAGNESIUM SULFATE IN WATER 4 G: 40 INJECTION, SOLUTION INTRAVENOUS at 08:52

## 2018-04-24 RX ADMIN — HEPARIN SODIUM 500 UNITS/HR: 1000 INJECTION, SOLUTION INTRAVENOUS; SUBCUTANEOUS at 10:00

## 2018-04-24 NOTE — PLAN OF CARE
Problem: Patient Care Overview  Goal: Plan of Care/Patient Progress Review  Outcome: No Change  D: Ischemic Cardiomyopathy.  I/A: VSS. Denies pain. Sinus tachycardia. Dobutamine drip continues at 2.5 mcg/kg/min. Dialysis Tuesday/thursday/saturday.  P: Continue to monitor.

## 2018-04-24 NOTE — PROGRESS NOTES
HEMODIALYSIS TREATMENT NOTE    Date: 4/24/2018  Time: 2:58 PM    Data:  Pre Wt: 74.7 kg (164 lb 10.9 oz)   Desired Wt: 71.7 kg   Post Wt: 74.7 kg (164 lb 10.9 oz)  Weight gain: 0 kg   Weight change: 0 kg  Ultrafiltration - Post Run Net Total Removed (mL): 0 mL  Ultrafiltration - Post Run Net Total Gain (mL): 0 mL  Vascular Access Status: Yes, secured and visible  Dialyzer Rinse: Clear  Total Blood Volume Processed: 71.5  Total Dialysis (Treatment) Time:  3.5hrs    Lab:   Yes  (magnesium drawn with 15 mins remaining of tx)    Interventions/ Assessment:  Stable run, tolerated HD well.  No UF ordered.  Ate lunch toward end of tx, pre , 3u insulin given per SS AC order.  Phoslo taken AC.  VSS throughout tx.  CVC patent and functioning well, previously NS locked with tegos.  Per order, heparin locked, tegos removed.  CVC dressing changed, CDI.  2200u heparin administered throughout HD (low dose 500u/hr and 500u bolus).  Hand off given to primary RN.     Plan:    Next HD Thursday, or per renal team.  Outpatient schedule T,Th, Sa.  Continue to heparin lock CVC and use low dose heparin during HD tx d/t history of clotting HD system and having CVC patency problems.

## 2018-04-24 NOTE — PLAN OF CARE
Problem: Patient Care Overview  Goal: Plan of Care/Patient Progress Review  Pt Aox4 VSS. Dobutamine at 2.5. Pt no producing urine dialysis T,Th, Sat. Pt had one emesis for unexplained reason, did not want any meds so essential oils given. C/o of pain in foot, team notified and with be watched. Will continue with care plan, continue to monitor and notify MD's of any changes.

## 2018-04-24 NOTE — PROGRESS NOTES
"CLINICAL NUTRITION SERVICES - ASSESSMENT NOTE     Nutrition Prescription    RECOMMENDATIONS FOR MDs/PROVIDERS TO ORDER:  Rec thiamine (100 mg/day) if pt has NYHA Class III-IV heart failure.       Recommendations already ordered by Registered Dietitian (RD):  Prn snack/supplement order   Nephrocaps    Future/Additional Recommendations:  1. Continue low-sodium diet and fluid restriction. A low potassium diet restriction or low phos diet restriction can be added as a \"Combination Diet\" if these labs are consistently high (K+ 4.5 and Phos 5.4 today).  2. Monitor BG control. DM II hx. Hgb A1c of 8.6 on 2/2/18 and then 6.3 on 4/4/18.   3. Consider checking vitamin D lab. Pt's 25 OH vitamin D total was 23 on 4/11/17. Pt currently on doxercalciferol.   4. Continue phos binder as per team.   5. Consider checking folic acid and vitamin B12.      REASON FOR ASSESSMENT  Murray Nicholson is a/an 63 year old male assessed by the dietitian for LOS    NUTRITION HISTORY  Pt was seen for outpatient nutrition appointment 4/16/18 for LVAD/Heart Tx workup. Sodium intake was discussed with pt by RD as well as potassium in foods/beverages. Post-Tx nutrition education was briefly discussed. \"Pt cooks for self without added salt. He has been on HD since 1/2018 (previously on PD since 8/2017). Receives a protein drink there. Appetite ok, eating TID meals. Pt's relative mentioned black seed oil 'good for the heart', but pt is debating taking it (per RD note, emailed patient description of it, as it is not available to the general public. However, black seed oil has documented interactions with immunosuppression). No physical activity due to fatigue.\"     Diet Recall on 4/16/18  Breakfast Cereal, 1/2 bagel, eggs   Lunch Deli meat sandwich or leftover chicken sandwich or more breakfast foods    Dinner chicken + rice + frozen veggies   Snacks Protein bars at dialysis, some mixed nuts    Beverages Water, milk with cereal, 1 coffee with agave and half " "and half, some orange juice or Naked juice, occasional ginger ale    Alcohol None    Dining out 2x/month      Per H & P, pt with respiratory distress and expedited workup for LVAD/heart/kidney Tx.  He has been on HD since 1/2018 (previously on PD since 8/2017). \"Not drinking too much water/salt.\" Per chart, pt has a shrimp allergy. PTA, pt was ordered to take, noting, B-complex vitamin, calcium acetate with meals, and glipizide.   Pt was not in room when attempting to see.    CURRENT NUTRITION ORDERS  Diet: 2 g Sodium  Intake/Tolerance: Good diet tolerance. Flowsheets indicate pt consumed 100% of meals with a good appetite 4/16, % of meals with a good appetite 4/17, 50-75% of meals with a fair to good appetite 4/18, % of meals with a poor to good appetite 4/20, % of meals with a good appetite 4/21, 75% of meals with a good appetite 4/22, and 0-100% of meals with a good appetite 4/23. Pt was not in room when attempting to see. Per RN yesterday (4/23), one emesis for unexplained reason. Good appetite per RN 4/22.    LABS  Labs reviewed    MEDICATIONS  Medications reviewed    ANTHROPOMETRICS  Height: 172.7 cm (5' 8\")  Most Recent Weight: 77.2 kg (170 lb 3.2 oz)    IBW: 70 kg   BMI: Overweight BMI 25-29.9  Weight History: 83.6 kg (4/28/17), 89.4 kg (8/18/17), 80.2 kg (1/20/18), 74.8 kg (3/22/18) - Pt has lost 7.1% of his body wt over the past three months. Difficult to assess if actual vs fluid loss. Pt did start HD in January 2018.        - Per RD note 4/16/18, \"Pt reports no edema nor major changes in weight. He reports intentionally losing ~70 lbs over the course of 2-3 years, first by intentionally eating less and by listening to body's hunger, then bringing more home cooked foods from home to work, and also cutting out alcohol.\"  Dosing Weight: 75 kg (based on lowest wt this admission of 75.3 kg on 4/18)    ASSESSED NUTRITION NEEDS  Estimated Energy Needs: 0986-0391 kcals/day (30 - 35 " kcals/kg)  Justification: Increased needs, pt on dialysis.   Estimated Protein Needs:  grams protein/day (1.2 - 1.8 grams of pro/kg)  Justification: Increased needs, pt on dialysis.   Estimated Fluid Needs: UOP + 500 mL/day or per team, pending fluid status    PHYSICAL FINDINGS/OTHER FINDINGS  See malnutrition section below.  Per chart, potential valve surgery with LVAD backup vs listing for heart Tx    MALNUTRITION  % Intake: Not meeting this criteria.     % Weight Loss: Weight loss does not meet criteria. Also, difficult to assess with fluid status changes and dialysis  Subcutaneous Fat Loss: Unable to assess. Pt was not in room when attempting to see. None noted per RD 4/16/18.  Muscle Loss: Unable to assess. Pt was not in room when attempting to see. None noted per RD 4/16/18.  Fluid Accumulation/Edema: Does not meet criteria  Malnutrition Diagnosis: Unable to determine due to pt not in room when attempting to see.    NUTRITION DIAGNOSIS  Predicted inadequate nutrient intake (protein-energy) related to potential upcoming surgery and diet restrictions.     INTERVENTIONS  Implementation  Nutrition Education: Written low-sodium nutrition education and K+ handout provided 4/17.  Medical food supplement therapy: Prn supplement order. Pt may request oral supplements/snacks prn.   Multivitamin/mineral supplement therapy: Ordered nephrocaps/triphrocaps as pt is on dialysis    Goals  Patient to consume % of nutritionally adequate meal trays TID, or the equivalent with supplements/snacks.     Monitoring/Evaluation  Progress toward goals will be monitored and evaluated per protocol.     Nutrition will continue to follow.     Mary Silva, MS, RD, LD, [facility]   6C Pgr: [phone]

## 2018-04-24 NOTE — PLAN OF CARE
Problem: Patient Care Overview  Goal: Plan of Care/Patient Progress Review  D/I/A: Pt here w/ decompensated heart failure. Dobutamine at 2.5 mcg/kg/min. VSS, independent, ST, RA. Dialysis TRS.  P: Continue to monitor.

## 2018-04-24 NOTE — PLAN OF CARE
Problem: Patient Care Overview  Goal: Plan of Care/Patient Progress Review  OT/CR 6C:  Discharge Planner OT   Patient plan for discharge: Pt reports he anticipates getting transplant prior to discharge   Current status: Pt SBA with bed mobility, min vc for line management in room. Pt ambulated 900 ft with IV pole and SBA with one seated rest break.   Barriers to return to prior living situation: medical status  Recommendations for discharge: Per plan established by the Occupational Therapist, the recommendation for discharge location is Home with OP CR Phase II.   Rationale for recommendations: Pt would benefit from continued therapy to increase activity tolerance and increased independence with ADLs/IADLs.       Entered by: Harika Murray 04/24/2018 4:24 PM

## 2018-04-24 NOTE — PROGRESS NOTES
[facility]   Cardiology II Service / Advanced Heart Failure  Daily Progress Note  Date of Service: 4/24/2018      Patient: Murray Nicholson  MRN: [id number]  Admission Date: 4/16/2018  Hospital Day # 8    Assessment and Plan: Murray is a 63 year old male with a PMH of CAD, ICM (EF of 15-20%), ESRD, CKD Stage V now on hemodialysis (previously on peritoneal dialysis with peritonitis in January 2018), DM II, HTN, dyslipidemia, bicuspid aortic valve, severe MR, and proximal AAA who was admitted from the [facility] clinic for decompensated HF. Will be presented this Friday at Heart transplant and Renal conference to determine whether to list vs surgically fix valves with LVAD backup.    Chronic systolic heart failure secondary to ICM. Stage D  NYHA Class IIIB   ACEi/ARB: No deferred secondary to hypotension with previous attempts.  BB: No, deferred secondary to hypotension   Aldosterone antagonist: contraindicated due to renal dysfunction  SCD prophylaxis: decision deferred during medication uptitration  Fluid status: Euvolemic.  Anticoagulation: None.   Antiplatelet:  ASA 81 mg daily.   Sleep Apnea:  Doesn't tolerate CPAP.   Not using.    Right maxillary Sinusitis.  CT of head: Right maxillary sinusitis  -Continue Doxycycline 100 mg twice daily for 7 days.  Day 6/7.     ESRD:  Currently getting dialysis on T, Th, Sa   -Nephrology following.   -Appreciate recs.   -Dialysis today.    DM II:  Last Hemoglobin A1C:  4/4:  6.3.  Home meds: Glipizide 5 mg daily.    -BG trends today: 100's with isolated reading at 191.  -Continue high intensity SS insulin    Hypomagnesia:    -Mag 1.4 today.    -Initiate electrolyte protocol.  -Recheck Mag in am    Chronic Medical Issues:  CAD:  Coronary angiogram 2/8/17 showed no obstructive CAD.  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  Continue ASA 81 mg daily and Atorvastatin 40 mg daily.   AS and MR:  Being considered for AVR and MVR with LVAD backup. CVTS to  "be consulted on admission and will follow.    Ascending aortic aneurysm:  4.5 x 4.5 cm proximal ascending aortic aneurysm seen on MRI done 2/14. Recent echo done 2/2/18 showed size of 4.2 cm.           FEN: 2 gm sodium diet  PROPHY:  Ambulation  LINES:  PIV  DISPO:  TBD based on hospital course  CODE STATUS:  Full  ================================================================    Interval History/ROS:   Feeling well.  Tolerating UF today.  Pain in his foot has improved.  Denies SOB at rest, no PND, orthopnea, chest pain, palpitations, lightheadedness, dizziness, near syncopal/syncopal episodes.  MEADE unchanged.  No other concerns today.  Feels legs are more edematous.  Ambulating more around the units.        Last 24 hr care team notes reviewed.   ROS:  4 point ROS including Respiratory, CV, GI and , other than that noted in the HPI, is negative.     Medications: Reviewed in EPIC.     Physical Exam:   /78 (BP Location: Right arm)  Pulse 113  Temp 98.1  F (36.7  C) (Oral)  Resp 18  Ht 1.727 m (5' 8\")  Wt 77.2 kg (170 lb 3.2 oz)  SpO2 100%  BMI 25.88 kg/m2  GENERAL: Appears alert and oriented times three.   HEENT: EOMI. Mucous membranes moist and without lesions.  NECK: Supple and without lymphadenopathy. JVD just above clavicle.    CV: Tachycardia, S1S2 present without murmur, rub, or gallop.   RESPIRATORY: Respirations regular, even, and unlabored. Lungs CTA throughout.   GI: Soft and non distended with normoactive bowel sounds present in all quadrants. No tenderness, rebound, guarding. No organomegaly.   EXTREMITIES: Trace peripheral edema. 2+ bilateral pedal pulses.   NEUROLOGIC: Alert and orientated x 3. CN II-XII grossly intact. No focal deficits.   MUSCULOSKELETAL: Left foot not painful with mild palpation.  No increased warmth or erythema.   SKIN: No jaundice. No rashes, lesions, or cyanosis. Toenails thick.  Left forearm examined.  Firm, nontender.  IV infiltration has not gone past " markings.    Data:  CMP    Recent Labs  Lab 04/24/18  0530 04/23/18  0650 04/22/18  0644 04/21/18  0640    138 137 135   POTASSIUM 4.5 4.4 4.0 4.0   CHLORIDE 98 100 99 96   CO2 29 28 27 24   ANIONGAP 10 9 11 15*   * 85 106* 111*   BUN 38* 31* 20 41*   CR 8.76* 7.15* 5.15* 7.72*   GFRESTIMATED 6* 8* 11* 7*   GFRESTBLACK 7* 9* 14* 9*   TRINI 8.7 9.0 8.5 9.2   MAG 1.4* 1.6 1.6 1.6   PHOS  --   --   --  5.4*     CBC    Recent Labs  Lab 04/24/18  0530 04/23/18  0650 04/22/18  0644 04/21/18  0640   WBC 4.6 4.3 4.4 4.9   RBC 3.49* 3.60* 3.68* 3.83*   HGB 11.0* 11.0* 11.5* 12.0*   HCT 34.4* 35.9* 36.3* 37.7*   MCV 99 100 99 98   MCH 31.5 30.6 31.3 31.3   MCHC 32.0 30.6* 31.7 31.8   RDW 15.7* 15.4* 15.7* 15.8*    193 192 201     INRNo lab results found in last 7 days.      Patient seen and discussed with Dr. Minaya.      Kristi MARTIN, CNP  [phone]  Cards II Service  4/24/2018

## 2018-04-24 NOTE — PLAN OF CARE
Problem: Patient Care Overview  Goal: Plan of Care/Patient Progress Review    D/I/A: Patient admitted 4/16 for increased shortness of breath and tachypnea; now being worked up for LVAD vs heart-kidney transplant vs surgical repair of valves. Patient continues on dobutamine at 2.5 mcg/kg/min. Creatinine 8.76; pt had dialysis today, no fluid removal-see dialysis note for further details. Mg level 1.4 this am, replaced with 4 grams of Magnesium Sulfate; Mg re-check 1.7, replaced with an additional 2 grams of Magnesium Sulfate; Next Mg level check tomorrow morning. VSS, afebrile, sats % on RA, Sinus Tach, denies pain.    P: Pt's case to be presented to committee on Friday to determine appropriate treatment plan for pt (LVADvs transplant vs valve repair). Continue with current plan of care. Contact Cards 2 for any questions or concerns.    Alma Rosa Duenas RN  Cardiology

## 2018-04-24 NOTE — PROGRESS NOTES
Nephrology Progress Note  04/24/2018         Assessment & Recommendations:   Murray Nicholson is a 63 yr old male with PMH of HTN, DMII, functionally bicuspid aortic valve, CHF/CM (EF 10-15%), ESRD, admitted with worsening dyspnea/SOB.      ESKD: due to DMII, on PD since Aug 2017, changed to iHD 1/2018 due to polymicrobial and fungal peritonitis, now on TTS schedule at [facility] under care of Dr Mcpherson. Access: tunneled RIJ (replaced 4/17/18). Run time: 4 hrs; EDW 77 kg.  - Dialysis per TTS schedule   - Heparin lock CVC     Access: just had tunneled RIJ replaced on 3/22/18; however wasn't working well so replaced again 4/17/18.          Volume: 77 kg, recently (2/2018) challenged with significant improvement in PCW. RHC on 4/20 with PCW of 10, RA 1.  -  Given very low RA pressure, will reset EDW at 77 kg  - Daily standing weights please     Severe AV and MR insufficiency:   BP/congestive CM (EF 10-15%); minimal CAD per angio on 2/8/17. Chronically hypotensive with tachycardia  - Goal MAP > 65 and SBP > 95 while dialyzing  - Now on dobutamine 2.5 mcg/kg/min (started 4/20/18)  - being evaluated for heart/kidney transplant, will likely remain inpatient awaiting transplant         Anemia: hgb 11.0; Recent labs with hgb in 11's, ferritin 371, IS 12, iron 36; on venofer 50 mg qTuesday, ANASTASIYA recently stopped.  - Continue venofer      BMD: calcium 8.7, alb 3.5, phos 5.4; recent ; on hectorol 0.5 mcg via HD; on phoslo 1 tab TID with meals.   - Continue hectorol via HD  - Continue Phoslo    Recommendations were communicated to primary team via this note.       Kristi Armas PA-C  Division of Kidney Disease  [phone]    Interval History :   Seen on dialysis, minimal UF to new dry weight. On dobutamine gtt. Heart tx eval being completed, will likely remain inpatient waiting for tx. Denies n/v, CP, chills.    Review of Systems:   4 point ROS negative other than as noted above.    Physical Exam:   I/O last 3  "completed shifts:  In: 789.31 [P.O.:720; I.V.:69.31]  Out: 200 [Emesis/NG output:200]   /77  Pulse 113  Temp 98.2  F (36.8  C) (Oral)  Resp 16  Ht 1.727 m (5' 8\")  Wt 77.2 kg (170 lb 3.2 oz)  SpO2 99%  BMI 25.88 kg/m2     GENERAL APPEARANCE: alert, NAD  EYES: no scleral icterus, pupils equal   Pulmonary: lungs clear to auscultation with equal breath sounds bilaterally   CV: regular rhythm, normal rate    - Edema none  GI: soft, nontender, normal bowel sounds  MS: no evidence of inflammation in joints, no muscle tenderness  : no doyle  SKIN: no rash, warm, dry, no cyanosis  NEURO: face symmetric, no asterixis   Access: tunneled RIJ       Labs:   All labs reviewed by me  Electrolytes/Renal -   Recent Labs   Lab Test  04/24/18   0530  04/23/18   0650  04/22/18   0644  04/21/18   0640   04/16/18   1546   02/12/18   0656   NA  137  138  137  135   < >  135   < >  136   POTASSIUM  4.5  4.4  4.0  4.0   < >  5.0   < >  4.5   CHLORIDE  98  100  99  96   < >  101   < >  103   CO2  29  28  27  24   < >  18*   < >  22   BUN  38*  31*  20  41*   < >  63*   < >  37*   CR  8.76*  7.15*  5.15*  7.72*   < >  8.81*   < >  7.12*   GLC  101*  85  106*  111*   < >  253*   < >  121*   TRINI  8.7  9.0  8.5  9.2   < >  9.6   < >  8.8   MAG  1.4*  1.6  1.6  1.6   < >  1.9   --   1.9   PHOS   --    --    --   5.4*   --   6.4*   --   4.1    < > = values in this interval not displayed.       CBC -   Recent Labs   Lab Test  04/24/18   0530  04/23/18   0650  04/22/18   0644   WBC  4.6  4.3  4.4   HGB  11.0*  11.0*  11.5*   PLT  194  193  192       LFTs -   Recent Labs   Lab Test  04/16/18   1546  03/07/18   0833  02/19/18   1558  02/02/18   1736   ALKPHOS  123  129  102  86   BILITOTAL  0.8  0.7  0.6  0.4   ALT  22  21  15  16   AST  17  18  18  20   PROTTOTAL  7.4   --   7.2  6.2*   ALBUMIN  3.5   --   2.7*  2.1*       Iron Panel -   Recent Labs   Lab Test  07/19/17   1306  07/05/17   1204  06/21/17   1058   IRON  46  26*  69 "   IRONSAT  18  12*  29   ALBERTINA  369  542*  557*         Imaging:  Reviewed    Current Medications:    allopurinol  100 mg Oral Daily     aspirin  81 mg Oral Daily     atorvastatin  40 mg Oral Daily     calcium acetate  667 mg Oral TID w/meals     chlorhexidine  15 mL Swish & Spit BID     doxycycline  100 mg Oral Q12H JEAN     fluticasone  1-2 spray Both Nostrils Daily     gelatin absorbable  1 each Topical During Hemodialysis (from stock)     heparin  3 mL Intracatheter During Hemodialysis (from stock)     heparin  3 mL Intracatheter During Hemodialysis (from stock)     insulin aspart  1-10 Units Subcutaneous TID AC     insulin aspart  1-7 Units Subcutaneous At Bedtime     insulin glargine  10 Units Subcutaneous At Bedtime     omeprazole  20 mg Oral Daily     sodium chloride (PF)  10 mL Intracatheter Q7 Days     sodium chloride (PF)  3 mL Intracatheter Q8H     sodium chloride (PF)  3 mL Intracatheter During Hemodialysis (from stock)     sodium chloride (PF)  3 mL Intracatheter During Hemodialysis (from stock)       DOBUTamine 2.5 mcg/kg/min (04/24/18 1050)     heparin (porcine) 500 Units/hr (04/17/18 1711)     heparin (porcine) 500 Units/hr (04/24/18 1000)     Reason ACE/ARB/ARNI order not selected       Reason beta blocker order not selected       Kristi Armas PA-C

## 2018-04-25 LAB
ANION GAP SERPL CALCULATED.3IONS-SCNC: 8 MMOL/L (ref 3–14)
BUN SERPL-MCNC: 23 MG/DL (ref 7–30)
CALCIUM SERPL-MCNC: 8.7 MG/DL (ref 8.5–10.1)
CHLORIDE SERPL-SCNC: 99 MMOL/L (ref 94–109)
CO2 SERPL-SCNC: 28 MMOL/L (ref 20–32)
CREAT SERPL-MCNC: 6 MG/DL (ref 0.66–1.25)
GFR SERPL CREATININE-BSD FRML MDRD: 10 ML/MIN/1.7M2
GLUCOSE SERPL-MCNC: 230 MG/DL (ref 70–99)
MAGNESIUM SERPL-MCNC: 2.4 MG/DL (ref 1.6–2.3)
POTASSIUM SERPL-SCNC: 4.6 MMOL/L (ref 3.4–5.3)
SODIUM SERPL-SCNC: 135 MMOL/L (ref 133–144)

## 2018-04-25 PROCEDURE — 83735 ASSAY OF MAGNESIUM: CPT | Performed by: INTERNAL MEDICINE

## 2018-04-25 PROCEDURE — 25000132 ZZH RX MED GY IP 250 OP 250 PS 637: Performed by: STUDENT IN AN ORGANIZED HEALTH CARE EDUCATION/TRAINING PROGRAM

## 2018-04-25 PROCEDURE — 25000128 H RX IP 250 OP 636: Performed by: INTERNAL MEDICINE

## 2018-04-25 PROCEDURE — 94640 AIRWAY INHALATION TREATMENT: CPT

## 2018-04-25 PROCEDURE — 40000275 ZZH STATISTIC RCP TIME EA 10 MIN

## 2018-04-25 PROCEDURE — 80048 BASIC METABOLIC PNL TOTAL CA: CPT | Performed by: INTERNAL MEDICINE

## 2018-04-25 PROCEDURE — 99232 SBSQ HOSP IP/OBS MODERATE 35: CPT | Performed by: NURSE PRACTITIONER

## 2018-04-25 PROCEDURE — 21400006 ZZH R&B CCU INTERMEDIATE UMMC

## 2018-04-25 PROCEDURE — 25000125 ZZHC RX 250: Performed by: NURSE PRACTITIONER

## 2018-04-25 PROCEDURE — 25000132 ZZH RX MED GY IP 250 OP 250 PS 637: Mod: GY | Performed by: NURSE PRACTITIONER

## 2018-04-25 PROCEDURE — 40000802 ZZH SITE CHECK

## 2018-04-25 PROCEDURE — A9270 NON-COVERED ITEM OR SERVICE: HCPCS | Mod: GY | Performed by: STUDENT IN AN ORGANIZED HEALTH CARE EDUCATION/TRAINING PROGRAM

## 2018-04-25 PROCEDURE — A9270 NON-COVERED ITEM OR SERVICE: HCPCS | Mod: GY

## 2018-04-25 PROCEDURE — 25000132 ZZH RX MED GY IP 250 OP 250 PS 637

## 2018-04-25 PROCEDURE — A9270 NON-COVERED ITEM OR SERVICE: HCPCS | Mod: GY | Performed by: NURSE PRACTITIONER

## 2018-04-25 RX ORDER — DEXTROSE MONOHYDRATE 25 G/50ML
25-50 INJECTION, SOLUTION INTRAVENOUS
Status: DISCONTINUED | OUTPATIENT
Start: 2018-04-25 | End: 2018-04-25

## 2018-04-25 RX ORDER — NICOTINE POLACRILEX 4 MG
15-30 LOZENGE BUCCAL
Status: DISCONTINUED | OUTPATIENT
Start: 2018-04-25 | End: 2018-04-25

## 2018-04-25 RX ADMIN — ATORVASTATIN CALCIUM 40 MG: 40 TABLET, FILM COATED ORAL at 21:16

## 2018-04-25 RX ADMIN — FLUTICASONE PROPIONATE 2 SPRAY: 50 SPRAY, METERED NASAL at 21:16

## 2018-04-25 RX ADMIN — CHLORHEXIDINE GLUCONATE 15 ML: 1.2 RINSE ORAL at 21:17

## 2018-04-25 RX ADMIN — DOXYCYCLINE HYCLATE 100 MG: 100 CAPSULE ORAL at 08:09

## 2018-04-25 RX ADMIN — ALBUTEROL SULFATE 2.5 MG: 2.5 SOLUTION RESPIRATORY (INHALATION) at 21:21

## 2018-04-25 RX ADMIN — INSULIN GLARGINE 10 UNITS: 100 INJECTION, SOLUTION SUBCUTANEOUS at 21:50

## 2018-04-25 RX ADMIN — ASPIRIN 81 MG CHEWABLE TABLET 81 MG: 81 TABLET CHEWABLE at 21:16

## 2018-04-25 RX ADMIN — CALCIUM ACETATE 667 MG: 667 CAPSULE ORAL at 18:54

## 2018-04-25 RX ADMIN — OMEPRAZOLE 20 MG: 20 CAPSULE, DELAYED RELEASE ORAL at 20:50

## 2018-04-25 RX ADMIN — CHLORHEXIDINE GLUCONATE 15 ML: 1.2 RINSE ORAL at 08:10

## 2018-04-25 RX ADMIN — ALLOPURINOL 100 MG: 100 TABLET ORAL at 08:09

## 2018-04-25 RX ADMIN — INSULIN ASPART 1 UNITS: 100 INJECTION, SOLUTION INTRAVENOUS; SUBCUTANEOUS at 18:48

## 2018-04-25 RX ADMIN — INSULIN ASPART 3 UNITS: 100 INJECTION, SOLUTION INTRAVENOUS; SUBCUTANEOUS at 12:42

## 2018-04-25 RX ADMIN — CALCIUM ACETATE 667 MG: 667 CAPSULE ORAL at 08:08

## 2018-04-25 RX ADMIN — Medication 1 CAPSULE: at 08:10

## 2018-04-25 RX ADMIN — CALCIUM ACETATE 667 MG: 667 CAPSULE ORAL at 12:46

## 2018-04-25 NOTE — PLAN OF CARE
Problem: Cardiac: Heart Failure (Adult)  Goal: Signs and Symptoms of Listed Potential Problems Will be Absent, Minimized or Managed (Cardiac: Heart Failure)  Signs and symptoms of listed potential problems will be absent, minimized or managed by discharge/transition of care (reference Cardiac: Heart Failure (Adult) CPG).   D- decompensated HF  I/A- Dobutamine gtt at 2.5mcg/kg/min. ST. RA. VSS. PICC occluded, TPA inserted and effective. Mag 2.4. K+ 4.6. Dialysis T,T,S. -205, SSI and carb coverage given as ordered.   P- Transplant committee review Friday. Continue to monitor and notify team of changes.     Addendum- Pt refused carb coverage at supper. Pre meal , would receive 3 units Novolog for carb coverage but pt refused saying he would likely drop low. Recheck prior to HS.

## 2018-04-25 NOTE — PROGRESS NOTES
[facility]   Cardiology II Service / Advanced Heart Failure  Daily Progress Note  Date of Service: 4/25/2018      Patient: Murray Nicholson  MRN: [id number]  Admission Date: 4/16/2018  Hospital Day # 9    Assessment and Plan: Murray is a 63 year old male with a PMH of CAD, ICM (EF of 15-20%), ESRD, CKD Stage V now on hemodialysis (previously on peritoneal dialysis with peritonitis in January 2018), DM II, HTN, dyslipidemia, bicuspid aortic valve, severe MR, and proximal AAA who was admitted from the [facility] for decompensated HF. Will be presented this Friday at Heart transplant and Renal conference to determine whether to list vs surgically fix valves with LVAD backup.    Plan:  1.  Podiatry consult placed for diabetic toenail clipping.           Chronic systolic heart failure secondary to ICM. Stage D  NYHA Class IIIB   ACEi/ARB: No deferred secondary to hypotension with previous attempts.  BB: No, deferred secondary to hypotension   Aldosterone antagonist: contraindicated due to renal dysfunction  SCD prophylaxis: decision deferred during medication uptitration  Fluid status: Euvolemic  Anticoagulation: None.   Antiplatelet:  ASA 81 mg daily.   Sleep Apnea:  Doesn't tolerate CPAP.   Not using.     ESRD:  Currently getting dialysis on T, Th, Sa   -Nephrology following.   -Appreciate recs.     DM II:  Last Hemoglobin A1C:  4/4:  6.3.  Home meds: Glipizide 5 mg daily.    -BG trends today: 100's with isolated reading at 191.  -Continue high intensity SS insulin  -'s-206 (highest).  Post prandial BG high in the 200's range.  Add carbohydrate coverage.  Give carbohydrate coverage 1 unit insulin for 12 carbohydrate units at lunch and dinner.      Hypomagnesia:  Improving.  -Mag 1.7 today.  (1.4)  -Continue electrolyte protocol.  -Recheck Mag in am    Chronic Medical Issues:  CAD:  Coronary angiogram 2/8/17 showed no obstructive CAD.  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   "Continue ASA 81 mg daily and Atorvastatin 40 mg daily.   AS and MR:  Being considered for AVR and MVR with LVAD backup. CVTS to be consulted on admission and will follow.    Ascending aortic aneurysm:  4.5 x 4.5 cm proximal ascending aortic aneurysm seen on MRI done 2/14. Recent echo done 2/2/18 showed size of 4.2 cm.      Right maxillary Sinusitis:  Resolved.  CT of head: Right maxillary sinusitis.  7 day course of Doxycycline 100 mg twice daily completed.    FEN: 2 gm sodium diet  PROPHY:  Ambulation  LINES:  PIV  DISPO:  TBD based on hospital course  CODE STATUS:  Full  ================================================================    Interval History/ROS:   Murray is doing well.  Slept well but feeling tired.  Denies SOB at rest, MEADE unchanged, no PND, edema, chest pain, palpitations, lightheadedness, dizziness, near syncopal/syncopal episodes.  Cramping in left foot has resolved with magnesium replacement.   No other concerns today.     Last 24 hr care team notes reviewed.   ROS:  4 point ROS including Respiratory, CV, GI and , other than that noted in the HPI, is negative.     Medications: Reviewed in EPIC.     Physical Exam:   /70  Pulse 113  Temp 97.9  F (36.6  C) (Oral)  Resp 18  Ht 1.727 m (5' 8\")  Wt 78 kg (172 lb)  SpO2 100%  BMI 26.15 kg/m2  GENERAL: Appears alert and oriented times three.   HEENT: EOMI. Mucous membranes moist and without lesions.  NECK: Supple and without lymphadenopathy. JVD 9 cm   CV: Tachycardia, S1S2 present without murmur, rub, or gallop.   RESPIRATORY: Respirations regular, even, and unlabored. Lungs CTA throughout.   GI: Soft and non distended with normoactive bowel sounds present in all quadrants. No tenderness, rebound, guarding. No organomegaly.   EXTREMITIES: No peripheral edema. 2+ bilateral pedal pulses.   NEUROLOGIC: Alert and orientated x 3. CN II-XII grossly intact. No focal deficits.   MUSCULOSKELETAL: Left foot not painful with mild palpation.  No " increased warmth or erythema.   SKIN: No jaundice. No rashes, lesions, or cyanosis. Toenails thick.  Left forearm examined.  Firm, nontender.  IV infiltration has not gone past markings.    Data:  CMP    Recent Labs  Lab 04/24/18  1251 04/24/18  0530 04/23/18  0650 04/22/18  0644 04/21/18  0640   NA  --  137 138 137 135   POTASSIUM  --  4.5 4.4 4.0 4.0   CHLORIDE  --  98 100 99 96   CO2  --  29 28 27 24   ANIONGAP  --  10 9 11 15*   GLC  --  101* 85 106* 111*   BUN  --  38* 31* 20 41*   CR  --  8.76* 7.15* 5.15* 7.72*   GFRESTIMATED  --  6* 8* 11* 7*   GFRESTBLACK  --  7* 9* 14* 9*   TRINI  --  8.7 9.0 8.5 9.2   MAG 1.7 1.4* 1.6 1.6 1.6   PHOS  --   --   --   --  5.4*     CBC    Recent Labs  Lab 04/24/18  0530 04/23/18  0650 04/22/18  0644 04/21/18  0640   WBC 4.6 4.3 4.4 4.9   RBC 3.49* 3.60* 3.68* 3.83*   HGB 11.0* 11.0* 11.5* 12.0*   HCT 34.4* 35.9* 36.3* 37.7*   MCV 99 100 99 98   MCH 31.5 30.6 31.3 31.3   MCHC 32.0 30.6* 31.7 31.8   RDW 15.7* 15.4* 15.7* 15.8*    193 192 201     INRNo lab results found in last 7 days.      Patient seen and discussed with Dr. Minaya.      Kristi Ayon APRN, CNP  [phone]  Cards II Service  4/25/2018

## 2018-04-25 NOTE — PLAN OF CARE
Problem: Patient Care Overview  Goal: Individualization & Mutuality  Outcome: No Change  D-pt requested and was allowed to sleep after HS cares, with q 1-2 hour status assessments. Pt's Dobutamine infused continuously during the nights to DL PICC. AM labs delayed due to lack of blood return from PICC line.  On-call Cards 2 physician (MICHELLE Aleman MD) was notified and ordered TPA to be infused to assist with blood return from the PICC line, for am lab draw.  Line flushes,with no blood return.  Waiting for TPA dose from pharmacy.     Pt had no complaints during the night and was grateful to have a good night's sleep.  I-Monitored, assessed and addressed pt's status and comfort for changes  A - stable status at present.   P- infuse TPA dose to clear PICC to draw am labs.  Continue to monitor, assess and address pt's status and comfort for changes.

## 2018-04-26 ENCOUNTER — APPOINTMENT (OUTPATIENT)
Dept: OCCUPATIONAL THERAPY | Facility: CLINIC | Age: 63
DRG: 001 | End: 2018-04-26
Attending: INTERNAL MEDICINE
Payer: COMMERCIAL

## 2018-04-26 LAB
HBA1C MFR BLD: 7 % (ref 0–6.4)
MAGNESIUM SERPL-MCNC: 1.7 MG/DL (ref 1.6–2.3)
MAGNESIUM SERPL-MCNC: 2.3 MG/DL (ref 1.6–2.3)
POTASSIUM SERPL-SCNC: 3.7 MMOL/L (ref 3.4–5.3)

## 2018-04-26 PROCEDURE — 84132 ASSAY OF SERUM POTASSIUM: CPT | Performed by: INTERNAL MEDICINE

## 2018-04-26 PROCEDURE — 94640 AIRWAY INHALATION TREATMENT: CPT | Mod: 76

## 2018-04-26 PROCEDURE — 21400006 ZZH R&B CCU INTERMEDIATE UMMC

## 2018-04-26 PROCEDURE — 83735 ASSAY OF MAGNESIUM: CPT | Performed by: INTERNAL MEDICINE

## 2018-04-26 PROCEDURE — A9270 NON-COVERED ITEM OR SERVICE: HCPCS | Mod: GY

## 2018-04-26 PROCEDURE — 90937 HEMODIALYSIS REPEATED EVAL: CPT

## 2018-04-26 PROCEDURE — 99233 SBSQ HOSP IP/OBS HIGH 50: CPT | Performed by: NURSE PRACTITIONER

## 2018-04-26 PROCEDURE — 40000802 ZZH SITE CHECK

## 2018-04-26 PROCEDURE — 25000132 ZZH RX MED GY IP 250 OP 250 PS 637: Mod: GY | Performed by: STUDENT IN AN ORGANIZED HEALTH CARE EDUCATION/TRAINING PROGRAM

## 2018-04-26 PROCEDURE — 00000146 ZZHCL STATISTIC GLUCOSE BY METER IP

## 2018-04-26 PROCEDURE — 83735 ASSAY OF MAGNESIUM: CPT | Performed by: STUDENT IN AN ORGANIZED HEALTH CARE EDUCATION/TRAINING PROGRAM

## 2018-04-26 PROCEDURE — 25000128 H RX IP 250 OP 636: Performed by: INTERNAL MEDICINE

## 2018-04-26 PROCEDURE — A9270 NON-COVERED ITEM OR SERVICE: HCPCS | Mod: GY | Performed by: STUDENT IN AN ORGANIZED HEALTH CARE EDUCATION/TRAINING PROGRAM

## 2018-04-26 PROCEDURE — 83036 HEMOGLOBIN GLYCOSYLATED A1C: CPT | Performed by: NURSE PRACTITIONER

## 2018-04-26 PROCEDURE — 25000132 ZZH RX MED GY IP 250 OP 250 PS 637: Mod: GY

## 2018-04-26 PROCEDURE — 40000133 ZZH STATISTIC OT WARD VISIT: Performed by: OCCUPATIONAL THERAPIST

## 2018-04-26 PROCEDURE — 97110 THERAPEUTIC EXERCISES: CPT | Mod: GO | Performed by: OCCUPATIONAL THERAPIST

## 2018-04-26 PROCEDURE — 25000125 ZZHC RX 250: Performed by: NURSE PRACTITIONER

## 2018-04-26 PROCEDURE — 94640 AIRWAY INHALATION TREATMENT: CPT

## 2018-04-26 RX ORDER — MAGNESIUM SULFATE HEPTAHYDRATE 40 MG/ML
4 INJECTION, SOLUTION INTRAVENOUS EVERY 4 HOURS PRN
Status: DISCONTINUED | OUTPATIENT
Start: 2018-04-26 | End: 2018-06-15

## 2018-04-26 RX ORDER — HEPARIN SODIUM 1000 [USP'U]/ML
500 INJECTION, SOLUTION INTRAVENOUS; SUBCUTANEOUS
Status: COMPLETED | OUTPATIENT
Start: 2018-04-26 | End: 2018-04-26

## 2018-04-26 RX ORDER — HEPARIN SODIUM 1000 [USP'U]/ML
500 INJECTION, SOLUTION INTRAVENOUS; SUBCUTANEOUS CONTINUOUS
Status: DISCONTINUED | OUTPATIENT
Start: 2018-04-26 | End: 2018-04-26

## 2018-04-26 RX ORDER — DOXERCALCIFEROL 4 UG/2ML
0.5 INJECTION INTRAVENOUS
Status: COMPLETED | OUTPATIENT
Start: 2018-04-26 | End: 2018-04-26

## 2018-04-26 RX ADMIN — ASPIRIN 81 MG CHEWABLE TABLET 81 MG: 81 TABLET CHEWABLE at 22:01

## 2018-04-26 RX ADMIN — HEPARIN SODIUM 500 UNITS: 1000 INJECTION, SOLUTION INTRAVENOUS; SUBCUTANEOUS at 08:37

## 2018-04-26 RX ADMIN — CHLORHEXIDINE GLUCONATE 15 ML: 1.2 RINSE ORAL at 19:55

## 2018-04-26 RX ADMIN — INSULIN ASPART 1 UNITS: 100 INJECTION, SOLUTION INTRAVENOUS; SUBCUTANEOUS at 18:49

## 2018-04-26 RX ADMIN — OMEPRAZOLE 20 MG: 20 CAPSULE, DELAYED RELEASE ORAL at 22:01

## 2018-04-26 RX ADMIN — ATORVASTATIN CALCIUM 40 MG: 40 TABLET, FILM COATED ORAL at 22:01

## 2018-04-26 RX ADMIN — CALCIUM ACETATE 667 MG: 667 CAPSULE ORAL at 07:52

## 2018-04-26 RX ADMIN — TRAMADOL HYDROCHLORIDE 50 MG: 50 TABLET, COATED ORAL at 01:43

## 2018-04-26 RX ADMIN — SODIUM CHLORIDE 250 ML: 9 INJECTION, SOLUTION INTRAVENOUS at 08:37

## 2018-04-26 RX ADMIN — HEPARIN SODIUM 500 UNITS/HR: 1000 INJECTION, SOLUTION INTRAVENOUS; SUBCUTANEOUS at 08:37

## 2018-04-26 RX ADMIN — INSULIN GLARGINE 10 UNITS: 100 INJECTION, SOLUTION SUBCUTANEOUS at 22:02

## 2018-04-26 RX ADMIN — FLUTICASONE PROPIONATE 2 SPRAY: 50 SPRAY, METERED NASAL at 22:02

## 2018-04-26 RX ADMIN — ALBUTEROL SULFATE 2.5 MG: 2.5 SOLUTION RESPIRATORY (INHALATION) at 22:16

## 2018-04-26 RX ADMIN — Medication 2 G: at 16:54

## 2018-04-26 RX ADMIN — CALCIUM ACETATE 667 MG: 667 CAPSULE ORAL at 13:41

## 2018-04-26 RX ADMIN — SODIUM CHLORIDE 300 ML: 9 INJECTION, SOLUTION INTRAVENOUS at 08:37

## 2018-04-26 RX ADMIN — ALLOPURINOL 100 MG: 100 TABLET ORAL at 07:52

## 2018-04-26 RX ADMIN — Medication 1 CAPSULE: at 07:52

## 2018-04-26 RX ADMIN — CALCIUM ACETATE 667 MG: 667 CAPSULE ORAL at 18:46

## 2018-04-26 RX ADMIN — DOXERCALCIFEROL 0.5 MCG: 4 INJECTION, SOLUTION INTRAVENOUS at 09:34

## 2018-04-26 NOTE — PROGRESS NOTES
[facility]   Cardiology II Service / Advanced Heart Failure  Daily Progress Note  Date of Service: 4/26/2018      Patient: Murray Nicholson  MRN: [id number]  Admission Date: 4/16/2018  Hospital Day # 10    Assessment and Plan: Murray is a 63 year old male with a PMH of CAD, ICM (EF of 15-20%), ESRD, CKD Stage V now on hemodialysis (previously on peritoneal dialysis with peritonitis in January 2018), DM II, HTN, dyslipidemia, bicuspid aortic valve, severe MR, and proximal AAA who was admitted from the [facility] for decompensated HF. Will be presented this Friday at Heart transplant and Renal conference to determine whether to list vs surgically fix valves with LVAD backup.    Plan:  1. No changes to today's plan.    Chronic systolic heart failure secondary to ICM. Stage D  NYHA Class IIIB   ACEi/ARB: No deferred secondary to hypotension with previous attempts.  BB: No, deferred secondary to hypotension   Aldosterone antagonist: contraindicated due to renal dysfunction  SCD prophylaxis: decision deferred during medication uptitration  Fluid status: Slightly hypervolemic.  Anticoagulation: None.   Antiplatelet:  ASA 81 mg daily.   Sleep Apnea:  Doesn't tolerate CPAP.   Not using.     ESRD:  Currently getting dialysis on T, Th, Sa   -Nephrology following.   -Appreciate recs.     DM II:  Last Hemoglobin A1C:  4/4:  6.3.  Home meds: Glipizide 5 mg daily.    -BG trends today: 100's with isolated reading at 191.  -Continue high intensity SS insulin  -BG controlled 125-139    Hypomagnesia:  Improving.  -Mag 1.7 today.  (1.4)  -Continue electrolyte protocol.  -Recheck Mag in am    NSVT:  19 beat NSVT run after dialysis.  Mag 1.7.  K 3.7.  Felt a little lightheaded per nursing.    -Continue to monitor for now      Chronic Medical Issues:  CAD:  Coronary angiogram 2/8/17 showed no obstructive CAD.  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  Continue ASA 81 mg daily and Atorvastatin 40 mg  "daily.   AS and MR:  Being considered for AVR and MVR with LVAD backup vs heart/kidney transplant.    Ascending aortic aneurysm:  4.5 x 4.5 cm proximal ascending aortic aneurysm seen on MRI done 2/14. Recent echo done 2/2/18 showed size of 4.2 cm.      Right maxillary Sinusitis:  Resolved.  CT of head: Right maxillary sinusitis.  7 day course of Doxycycline 100 mg twice daily completed.    FEN: 2 gm sodium diet  PROPHY:  Ambulation  LINES:  PIV  DISPO:  TBD based on hospital course  CODE STATUS:  Full  ================================================================    Interval History/ROS:   Murray is doing well.  Denies SOB at rest, MEADE unchanged, no PND, edema, chest pain, palpitations, lightheadedness, dizziness, near syncopal/syncopal episodes. Had toenails clipped.  Did have one episode of NSVT after dialysis today and felt lightheaded per nursing.  No further issues.     Last 24 hr care team notes reviewed.   ROS:  4 point ROS including Respiratory, CV, GI and , other than that noted in the HPI, is negative.     Medications: Reviewed in EPIC.     Physical Exam:   BP 99/65 (BP Location: Right arm)  Pulse 113  Temp 98.1  F (36.7  C) (Oral)  Resp 18  Ht 1.727 m (5' 8\")  Wt 78.6 kg (173 lb 4.8 oz)  SpO2 100%  BMI 26.35 kg/m2  GENERAL: Appears alert and oriented times three.   HEENT: EOMI. Mucous membranes moist and without lesions.  NECK: Supple and without lymphadenopathy. JVD 10 cm   CV: Tachycardia, S1S2 present without murmur, rub, or gallop.   RESPIRATORY: Respirations regular, even, and unlabored. Lungs CTA throughout.   GI: Soft and non distended with normoactive bowel sounds present in all quadrants. No tenderness, rebound, guarding. No organomegaly.   EXTREMITIES: No peripheral edema. 2+ bilateral pedal pulses.   NEUROLOGIC: Alert and orientated x 3. CN II-XII grossly intact. No focal deficits.   MUSCULOSKELETAL: No joint swelling or joint pain.  SKIN: No jaundice. No rashes, lesions, or cyanosis. " Toenails thick. +fungus.  Left forearm examined.  Nontender.  IV infiltration is resolving.  No erythema or warmth.  Small nodule one mm noted.    Data:  CMP    Recent Labs  Lab 04/26/18  1420 04/26/18  0839 04/25/18  1121 04/24/18  1251 04/24/18  0530 04/23/18  0650 04/22/18  0644 04/21/18  0640   NA  --   --  135  --  137 138 137 135   POTASSIUM 3.7  --  4.6  --  4.5 4.4 4.0 4.0   CHLORIDE  --   --  99  --  98 100 99 96   CO2  --   --  28  --  29 28 27 24   ANIONGAP  --   --  8  --  10 9 11 15*   GLC  --   --  230*  --  101* 85 106* 111*   BUN  --   --  23  --  38* 31* 20 41*   CR  --   --  6.00*  --  8.76* 7.15* 5.15* 7.72*   GFRESTIMATED  --   --  10*  --  6* 8* 11* 7*   GFRESTBLACK  --   --  12*  --  7* 9* 14* 9*   TRINI  --   --  8.7  --  8.7 9.0 8.5 9.2   MAG 1.7 2.3 2.4* 1.7 1.4* 1.6 1.6 1.6   PHOS  --   --   --   --   --   --   --  5.4*     CBC    Recent Labs  Lab 04/24/18  0530 04/23/18  0650 04/22/18  0644 04/21/18  0640   WBC 4.6 4.3 4.4 4.9   RBC 3.49* 3.60* 3.68* 3.83*   HGB 11.0* 11.0* 11.5* 12.0*   HCT 34.4* 35.9* 36.3* 37.7*   MCV 99 100 99 98   MCH 31.5 30.6 31.3 31.3   MCHC 32.0 30.6* 31.7 31.8   RDW 15.7* 15.4* 15.7* 15.8*    193 192 201     INRNo lab results found in last 7 days.      Patient seen and discussed with Dr. Minaya.      Kristi MARTIN, CNP  [phone]  Cards II Service  4/26/2018

## 2018-04-26 NOTE — PLAN OF CARE
Problem: Patient Care Overview  Goal: Plan of Care/Patient Progress Review  OT/CR 6C:  Discharge Planner OT   Patient plan for discharge: Pt reports he anticipates getting transplant prior to discharge.   Current status: Pt SBA with functional transfers in room. Pt tolerated standing with UE calisthenic exercises for 18 minutes. Pt completed x8 UE calisthenic exercises to increase tolerance; 1 minute standing rest break between bouts. VSS during session.   Barriers to return to prior living situation: medical status   Recommendations for discharge: Per plan established by the Occupational Therapist, the recommendation for discharge location is Home with OP CR Phase II.   Rationale for recommendations: Pt would benefit from continued therapy to increase activity tolerance and increased independence with ADLs/IADLs.       Entered by: Harika Murray 04/26/2018 1:48 PM

## 2018-04-26 NOTE — PLAN OF CARE
Problem: Cardiac: Heart Failure (Adult)  Goal: Signs and Symptoms of Listed Potential Problems Will be Absent, Minimized or Managed (Cardiac: Heart Failure)  Signs and symptoms of listed potential problems will be absent, minimized or managed by discharge/transition of care (reference Cardiac: Heart Failure (Adult) CPG).   D- decompensated HF  I/A- Dobutamine gtt at 2.5mcg/kg/min. Dialysis today 1.6L off. ST. RA. VSS. 28 beat run of MD MARISSA notified. Mag 1.7, replaced. K+ 3.7, not replaced per order.   P- Transplant committee review Friday. Continue to monitor and notify team of changes.

## 2018-04-26 NOTE — PROGRESS NOTES
HEMODIALYSIS TREATMENT NOTE    Date: 4/26/2018  Time: 1:24 PM    Data:  Pre Wt: 78.6 kg (173 lb 4.5 oz)   Desired Wt: 77 kg   Weight change: - 1.6 kg  Ultrafiltration - Post Run Net Total Removed (mL): 1600 mL  Ultrafiltration - Post Run Net Total Gain (mL): 0 mL  Vascular Access Status: Yes, secured and visible  Dialyzer Rinse: Streaked, Light  Total Blood Volume Processed: 78.3 Liters  Total Dialysis (Treatment) Time: 4 hrs     Lab:   Magnesium lab drawn    Interventions/Assessment:  Stable 4 hr HD tx via right CVC. Keep SBP > 95 and MAP > 65 per order. Hectorol 0.5 mcg given IV. 2200 total units of heparin given during tx. Pt had no complaints or issues during run. 1.6 kg removed to achieve EDW of 77 kg. CVC heparin locked per order. Report given to primary RN.      Plan:    Next HD tx per renal team.
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
## 2018-04-26 NOTE — PROGRESS NOTES
"  Nephrology Progress Note  04/26/2018         Assessment & Recommendations:   Murray Nicholson is a 63 yr old male with PMH of HTN, DMII, functionally bicuspid aortic valve, CHF/CM (EF 10-15%), ESRD, admitted with worsening dyspnea/SOB.      ESKD: due to DMII, on PD since Aug 2017, changed to iHD 1/2018 due to polymicrobial and fungal peritonitis, now on TTS schedule at [facility] under care of Dr Mcpherson. Access: tunneled RIJ (replaced 4/17/18). Run time: 4 hrs; EDW 77 kg.  - Dialysis per TTS schedule   - Heparin lock CVC          Volume: 77 kg, had challenged dry weight; RHC on 4/20 with PCW of 10, RA 1. Given very low RA pressure, we increased EDW to 77 kg  - Daily standing weights please     Severe AV and MR insufficiency:   BP/congestive CM (EF 10-15%); minimal CAD per angio on 2/8/17. Chronically hypotensive with tachycardia  - Goal MAP > 65 and SBP > 95 while dialyzing  - Now on dobutamine 2.5 mcg/kg/min (started 4/20/18)  - being evaluated for heart/kidney transplant, will likely remain inpatient awaiting transplant         Anemia: hgb 11.0; Recent labs with hgb in 11's, ferritin 371, IS 12, iron 36; on venofer 50 mg qTuesday, ANASTASIYA recently stopped.  - Continue venofer      BMD: calcium 8.7, alb 3.5, phos 5.4; recent ; on hectorol 0.5 mcg via HD; on phoslo 1 tab TID with meals.   - Continue hectorol via HD  - Continue Phoslo    Recommendations were communicated to primary team via this note.       FANTA GarciaC  Division of Kidney Disease  [phone]    Interval History :   Seen on dialysis, 1.5 kg UF to maintain dry weight. Pt reports feeling very well, much less tired. Denies n/v, CP, chills.    Review of Systems:   4 point ROS negative other than as noted above.    Physical Exam:   I/O last 3 completed shifts:  In: 340 [P.O.:300; I.V.:40]  Out: -    /76  Pulse 113  Temp 97.9  F (36.6  C) (Oral)  Resp 18  Ht 1.727 m (5' 8\")  Wt 78.6 kg (173 lb 4.8 oz)  SpO2 (P) 99%  BMI 26.35 " kg/m2     GENERAL APPEARANCE: alert, NAD  EYES: no scleral icterus, pupils equal   Pulmonary: lungs clear to auscultation with equal breath sounds bilaterally   CV: regular rhythm, normal rate    - Edema none  GI: soft, nontender, normal bowel sounds  MS: no evidence of inflammation in joints, no muscle tenderness  : no doyle  SKIN: no rash, warm, dry, no cyanosis  NEURO: face symmetric, no asterixis   Access: tunneled RIJ       Labs:   All labs reviewed by me  Electrolytes/Renal -   Recent Labs   Lab Test  04/26/18   0839  04/25/18   1121  04/24/18   1251  04/24/18   0530  04/23/18   0650   04/21/18   0640   04/16/18   1546   02/12/18   0656   NA   --   135   --   137  138   < >  135   < >  135   < >  136   POTASSIUM   --   4.6   --   4.5  4.4   < >  4.0   < >  5.0   < >  4.5   CHLORIDE   --   99   --   98  100   < >  96   < >  101   < >  103   CO2   --   28   --   29  28   < >  24   < >  18*   < >  22   BUN   --   23   --   38*  31*   < >  41*   < >  63*   < >  37*   CR   --   6.00*   --   8.76*  7.15*   < >  7.72*   < >  8.81*   < >  7.12*   GLC   --   230*   --   101*  85   < >  111*   < >  253*   < >  121*   TRINI   --   8.7   --   8.7  9.0   < >  9.2   < >  9.6   < >  8.8   MAG  2.3  2.4*  1.7  1.4*  1.6   < >  1.6   < >  1.9   --   1.9   PHOS   --    --    --    --    --    --   5.4*   --   6.4*   --   4.1    < > = values in this interval not displayed.       CBC -   Recent Labs   Lab Test  04/24/18   0530  04/23/18   0650  04/22/18   0644   WBC  4.6  4.3  4.4   HGB  11.0*  11.0*  11.5*   PLT  194  193  192       LFTs -   Recent Labs   Lab Test  04/16/18   1546  03/07/18   0833  02/19/18   1558  02/02/18   1736   ALKPHOS  123  129  102  86   BILITOTAL  0.8  0.7  0.6  0.4   ALT  22  21  15  16   AST  17  18  18  20   PROTTOTAL  7.4   --   7.2  6.2*   ALBUMIN  3.5   --   2.7*  2.1*       Iron Panel -   Recent Labs   Lab Test  07/19/17   1306  07/05/17   1204  06/21/17   1058   IRON  46  26*  69   IRONSAT  18   12*  29   ALBERTINA  369  542*  557*         Imaging:  Reviewed    Current Medications:    allopurinol  100 mg Oral Daily     aspirin  81 mg Oral Daily     atorvastatin  40 mg Oral Daily     calcium acetate  667 mg Oral TID w/meals     chlorhexidine  15 mL Swish & Spit BID     fluticasone  1-2 spray Both Nostrils Daily     gelatin absorbable  1 each Topical During Hemodialysis (from stock)     heparin  3 mL Intracatheter During Hemodialysis (from stock)     heparin  3 mL Intracatheter During Hemodialysis (from stock)     insulin aspart  1-10 Units Subcutaneous TID AC     insulin aspart  1-7 Units Subcutaneous At Bedtime     insulin aspart   Subcutaneous Daily with lunch     insulin aspart   Subcutaneous Daily with supper     insulin glargine  10 Units Subcutaneous At Bedtime     NEPHROCAPS  1 capsule Oral Daily     omeprazole  20 mg Oral Daily     sodium chloride (PF)  10 mL Intracatheter Q7 Days     sodium chloride (PF)  3 mL Intracatheter Q8H     sodium chloride (PF)  3 mL Intracatheter During Hemodialysis (from stock)     sodium chloride (PF)  3 mL Intracatheter During Hemodialysis (from stock)       DOBUTamine 2.5 mcg/kg/min (04/26/18 0754)     heparin (porcine) 500 Units/hr (04/17/18 1711)     heparin (porcine) 500 Units/hr (04/26/18 0837)     Reason ACE/ARB/ARNI order not selected       Reason beta blocker order not selected       Kristi Armas PA-C

## 2018-04-27 ENCOUNTER — APPOINTMENT (OUTPATIENT)
Dept: GENERAL RADIOLOGY | Facility: CLINIC | Age: 63
DRG: 001 | End: 2018-04-27
Attending: NURSE PRACTITIONER
Payer: COMMERCIAL

## 2018-04-27 LAB
ANION GAP SERPL CALCULATED.3IONS-SCNC: 10 MMOL/L (ref 3–14)
BUN SERPL-MCNC: 20 MG/DL (ref 7–30)
CALCIUM SERPL-MCNC: 8.5 MG/DL (ref 8.5–10.1)
CHLORIDE SERPL-SCNC: 96 MMOL/L (ref 94–109)
CO2 SERPL-SCNC: 28 MMOL/L (ref 20–32)
CREAT SERPL-MCNC: 2.77 MG/DL (ref 0.66–1.25)
GFR SERPL CREATININE-BSD FRML MDRD: 23 ML/MIN/1.7M2
GLUCOSE SERPL-MCNC: 214 MG/DL (ref 70–99)
MAGNESIUM SERPL-MCNC: 2.2 MG/DL (ref 1.6–2.3)
POTASSIUM SERPL-SCNC: 4.4 MMOL/L (ref 3.4–5.3)
RADIOLOGIST FLAGS: ABNORMAL
SODIUM SERPL-SCNC: 135 MMOL/L (ref 133–144)

## 2018-04-27 PROCEDURE — 25000128 H RX IP 250 OP 636: Performed by: INTERNAL MEDICINE

## 2018-04-27 PROCEDURE — 25000125 ZZHC RX 250: Performed by: NURSE PRACTITIONER

## 2018-04-27 PROCEDURE — 21400006 ZZH R&B CCU INTERMEDIATE UMMC

## 2018-04-27 PROCEDURE — A9270 NON-COVERED ITEM OR SERVICE: HCPCS | Mod: GY | Performed by: STUDENT IN AN ORGANIZED HEALTH CARE EDUCATION/TRAINING PROGRAM

## 2018-04-27 PROCEDURE — G0463 HOSPITAL OUTPT CLINIC VISIT: HCPCS | Mod: 25

## 2018-04-27 PROCEDURE — 80048 BASIC METABOLIC PNL TOTAL CA: CPT | Performed by: INTERNAL MEDICINE

## 2018-04-27 PROCEDURE — 25000132 ZZH RX MED GY IP 250 OP 250 PS 637: Mod: GY

## 2018-04-27 PROCEDURE — 40000986 XR CHEST PORT 1 VW

## 2018-04-27 PROCEDURE — 83735 ASSAY OF MAGNESIUM: CPT | Performed by: INTERNAL MEDICINE

## 2018-04-27 PROCEDURE — A9270 NON-COVERED ITEM OR SERVICE: HCPCS | Mod: GY

## 2018-04-27 PROCEDURE — 00000146 ZZHCL STATISTIC GLUCOSE BY METER IP

## 2018-04-27 PROCEDURE — 94640 AIRWAY INHALATION TREATMENT: CPT

## 2018-04-27 PROCEDURE — 99233 SBSQ HOSP IP/OBS HIGH 50: CPT | Performed by: NURSE PRACTITIONER

## 2018-04-27 PROCEDURE — 25000132 ZZH RX MED GY IP 250 OP 250 PS 637: Mod: GY | Performed by: STUDENT IN AN ORGANIZED HEALTH CARE EDUCATION/TRAINING PROGRAM

## 2018-04-27 PROCEDURE — 71045 X-RAY EXAM CHEST 1 VIEW: CPT

## 2018-04-27 PROCEDURE — 40000558 ZZH STATISTIC CVC DRESSING CHANGE

## 2018-04-27 RX ADMIN — ALLOPURINOL 100 MG: 100 TABLET ORAL at 07:54

## 2018-04-27 RX ADMIN — Medication 1 CAPSULE: at 07:54

## 2018-04-27 RX ADMIN — CALCIUM ACETATE 667 MG: 667 CAPSULE ORAL at 18:58

## 2018-04-27 RX ADMIN — INSULIN GLARGINE 10 UNITS: 100 INJECTION, SOLUTION SUBCUTANEOUS at 21:58

## 2018-04-27 RX ADMIN — INSULIN ASPART 3 UNITS: 100 INJECTION, SOLUTION INTRAVENOUS; SUBCUTANEOUS at 12:18

## 2018-04-27 RX ADMIN — FLUTICASONE PROPIONATE 2 SPRAY: 50 SPRAY, METERED NASAL at 20:15

## 2018-04-27 RX ADMIN — CALCIUM ACETATE 667 MG: 667 CAPSULE ORAL at 07:54

## 2018-04-27 RX ADMIN — OMEPRAZOLE 20 MG: 20 CAPSULE, DELAYED RELEASE ORAL at 20:14

## 2018-04-27 RX ADMIN — DOBUTAMINE HYDROCHLORIDE 2.5 MCG/KG/MIN: 400 INJECTION INTRAVENOUS at 15:09

## 2018-04-27 RX ADMIN — CHLORHEXIDINE GLUCONATE 15 ML: 1.2 RINSE ORAL at 07:54

## 2018-04-27 RX ADMIN — CALCIUM ACETATE 667 MG: 667 CAPSULE ORAL at 12:47

## 2018-04-27 RX ADMIN — ASPIRIN 81 MG CHEWABLE TABLET 81 MG: 81 TABLET CHEWABLE at 20:14

## 2018-04-27 RX ADMIN — ATORVASTATIN CALCIUM 40 MG: 40 TABLET, FILM COATED ORAL at 20:14

## 2018-04-27 RX ADMIN — CHLORHEXIDINE GLUCONATE 15 ML: 1.2 RINSE ORAL at 20:14

## 2018-04-27 RX ADMIN — ALBUTEROL SULFATE 2.5 MG: 2.5 SOLUTION RESPIRATORY (INHALATION) at 22:15

## 2018-04-27 NOTE — PLAN OF CARE
Problem: Patient Care Overview  Goal: Plan of Care/Patient Progress Review  Outcome: Improving  D: Patient admitted 4/16/18 from heart failure clinic follow-up appointment and transferred due to respiratory distress. Evaluation for LVAD/heart/kidney transplant in process. Hx of HF (EF 10%), severe AI and MR, ESRD on HemoDialysis (T, TH, Sat), and ascending aortic aneurysm.    I/A: Monitored vitals and assessed patient status. A0x4. VSS. Sinus tachycardia 100s-110s. Afebrile. Room air. Oliguric. Consult placed for Vascular to evaluate PICC for slow blood return in grey lumen. Chest x-ray obtained to determine PICC's location - adjusted and flushed with Normal Saline by Vascular RN with positive blood return via both lumens. No further needs at this time.    Changed: Dobutamine bag & IV tubing, PICC caps    Running: Dobutamine 2.5 mcg/kg/min    PRN: Albuterol neb - patient requests dose every night      P: Continue to monitor patient status and report changes to treatment team. Plan to continue LVAD/heart/kidney tx evaluation. Dialysis treatment Saturday.

## 2018-04-27 NOTE — PROGRESS NOTES
[facility]   Cardiology II Service / Advanced Heart Failure  Daily Progress Note  Date of Service: 4/27/2018      Patient: Murray Nicholson  MRN: [id number]  Admission Date: 4/16/2018  Hospital Day # 11    Assessment and Plan:  Murray is a 63 year old male with a PMH of CAD, ICM (EF of 15-20%), ESRD, CKD Stage V now on hemodialysis (previously on peritoneal dialysis with peritonitis in January 2018), DM II, HTN, dyslipidemia, bicuspid aortic valve, severe MR, and proximal AAA who was admitted from the [facility] clinic for decompensated HF. He is in the process of completing his heart transplant workup, goal is to list once complete.  Was to be presented at renal conference this morning.  Unable to connect with renal team today but Cards II team is aware.  Will have Jennifer Dean NP follow up on Monday.          Plan:  1. IV Vascular team consult to evaluate sluggish PICC>     Chronic systolic heart failure secondary to ICM. Stage D  NYHA Class IIIB   ACEi/ARB: No deferred secondary to hypotension with previous attempts.  BB: No, deferred secondary to hypotension   Aldosterone antagonist: contraindicated due to renal dysfunction  SCD prophylaxis: decision deferred during medication uptitration  Fluid status: Euvolemic.   Anticoagulation: None.   Antiplatelet:  ASA 81 mg daily.   Sleep Apnea:  Doesn't tolerate CPAP.   Not using.      ESRD:  Currently getting dialysis on T, Th, Sa   -Nephrology following.   -Appreciate recs.      DM II:  Last Hemoglobin A1C:  4/4:  6.3.  Home meds: Glipizide 5 mg daily.    -BG trends today: 100's with isolated reading at 191.  -Continue high intensity SS insulin.  Patient declined CHO coverage per nursing report yesterdya.  --214, higher today.        NSVT:  19 beat NSVT run after dialysis yesterday.  Mag 1.7.  K 3.7.  Felt a little lightheaded per nursing.    -No episodes today. Tele stable.          Chronic Medical Issues:  CAD:  Coronary angiogram 2/8/17 showed no  "obstructive CAD.  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  Continue ASA 81 mg daily and Atorvastatin 40 mg daily.   Ascending aortic aneurysm:  4.5 x 4.5 cm proximal ascending aortic aneurysm seen on MRI done 2/14. Recent echo done 2/2/18 showed size of 4.2 cm.      Right maxillary Sinusitis:  Resolved.  CT of head: Right maxillary sinusitis.  7 day course of Doxycycline 100 mg twice daily completed.     FEN: 2 gm sodium diet  PROPHY:  Ambulation  LINES:  PIV  DISPO:  TBD based on hospital course  CODE STATUS:  Full  ================================================================    Interval History/ROS: Donal is feeling well.  Symptoms unchanged.  Denies SOB at rest, PND, orthopnea, edema, chest pain, palpitations, lightheadedness, dizziness, near syncopal/syncopal episodes.  No more \"rushes\" that he felt with his run of VT yesterday.  Needs work restrictions letter updated to his place of employment and would like done as soon as possible.         Last 24 hr care team notes reviewed.   ROS:  4 point ROS including Respiratory, CV, GI and , other than that noted in the HPI, is negative.     Medications: Reviewed in EPIC.     Physical Exam:   /63 (BP Location: Right arm)  Pulse 107  Temp 98.1  F (36.7  C) (Oral)  Resp 20  Ht 1.727 m (5' 8\")  Wt 78.1 kg (172 lb 3.2 oz)  SpO2 100%  BMI 26.18 kg/m2  GENERAL: Appears alert and oriented times three.   HEENT: EOMI. Mucous membranes moist and without lesions.  NECK: Supple and without lymphadenopathy. JVD not appreciated.   CV: RRR, S1S2 present without murmur, rub, or gallop.   RESPIRATORY: Respirations regular, even, and unlabored. Lungs CTA throughout.   GI: Soft and non distended with normoactive bowel sounds present in all quadrants. No tenderness, rebound, guarding. No organomegaly.   EXTREMITIES: No peripheral edema. 2+ bilateral pedal pulses.   NEUROLOGIC: Alert and orientated x 3. CN II-XII grossly intact. No focal " deficits.   MUSCULOSKELETAL: No joint swelling or tenderness.   SKIN: No jaundice. No rashes, lesions, or cyanosis. Infiltrated IV site on left forearm is soft.  No erythema or warmth.  Approximately 1 mm nodule noted within outlined parameters towards medial aspect of the forearm.    Data:  CMP  Recent Labs  Lab 04/27/18  0655 04/26/18  1420 04/26/18  0839 04/25/18  1121  04/24/18  0530 04/23/18  0650  04/21/18  0640     --   --  135  --  137 138  < > 135   POTASSIUM 4.4 3.7  --  4.6  --  4.5 4.4  < > 4.0   CHLORIDE 96  --   --  99  --  98 100  < > 96   CO2 28  --   --  28  --  29 28  < > 24   ANIONGAP 10  --   --  8  --  10 9  < > 15*   *  --   --  230*  --  101* 85  < > 111*   BUN 20  --   --  23  --  38* 31*  < > 41*   CR 2.77*  --   --  6.00*  --  8.76* 7.15*  < > 7.72*   GFRESTIMATED 23*  --   --  10*  --  6* 8*  < > 7*   GFRESTBLACK 28*  --   --  12*  --  7* 9*  < > 9*   TRINI 8.5  --   --  8.7  --  8.7 9.0  < > 9.2   MAG 2.2 1.7 2.3 2.4*  < > 1.4* 1.6  < > 1.6   PHOS  --   --   --   --   --   --   --   --  5.4*   < > = values in this interval not displayed.  CBC  Recent Labs  Lab 04/24/18  0530 04/23/18  0650 04/22/18  0644 04/21/18  0640   WBC 4.6 4.3 4.4 4.9   RBC 3.49* 3.60* 3.68* 3.83*   HGB 11.0* 11.0* 11.5* 12.0*   HCT 34.4* 35.9* 36.3* 37.7*   MCV 99 100 99 98   MCH 31.5 30.6 31.3 31.3   MCHC 32.0 30.6* 31.7 31.8   RDW 15.7* 15.4* 15.7* 15.8*    193 192 201     INRNo lab results found in last 7 days.      Patient seen and discussed with Dr. Minaya.      Kristi MARTIN, CNP  [phone]  Cards II Service  4/27/2018

## 2018-04-27 NOTE — PLAN OF CARE
Problem: Patient Care Overview  Goal: Plan of Care/Patient Progress Review  D: 62 y/o M with PMH of HFrEF (EF 10%), severe AI and MR, ESRD on HD who went to heart failure clinic for post-hospital f/u and was sent from there due to respiratory distress and for expedited workup for LVAD/heart/kidney transplant. Ascending aortic aneurysm 4.2cm under surveillance.     I: Monitored vitals and assessed pt status.   Changed:  Running: Dobutamine gtt at 2.5mcg/kg/min  PRN: Pt wishes to have his PRN Albuterol neb every night.    A: A0x4. VSS, on RA. NSR. Afebrile. Pt declined FSG at 02 and wanted to sleep. Pt states he feels much better since starting the dobutamine.         Temp:  [97.9  F (36.6  C)-98.5  F (36.9  C)] 98.3  F (36.8  C)  Pulse:  [110] 110  Heart Rate:  [] 110  Resp:  [18] 18  BP: ()/(56-83) 107/74  Cuff Mean (mmHg):  [72-77] 72  SpO2:  [97 %-100 %] 100 %      P: Pt case to be presented to transplant team in the am. Continue to monitor Pt status and report changes to treatment team.

## 2018-04-27 NOTE — PROGRESS NOTES
Consult done to assess and declot catheter due to difficulty drawing blood back. Previous CXR shows catheter tip in azygos vein. PICC catheter pulled back 3 cm then power flushed with NS. 2nd  CXR shows catheter tip in SVC and ok to use PICC line. Time spent with patient was 15 minutes.

## 2018-04-28 ENCOUNTER — APPOINTMENT (OUTPATIENT)
Dept: GENERAL RADIOLOGY | Facility: CLINIC | Age: 63
DRG: 001 | End: 2018-04-28
Attending: INTERNAL MEDICINE
Payer: COMMERCIAL

## 2018-04-28 LAB
GLUCOSE BLDC GLUCOMTR-MCNC: 119 MG/DL (ref 70–99)
GLUCOSE BLDC GLUCOMTR-MCNC: 254 MG/DL (ref 70–99)
GLUCOSE BLDC GLUCOMTR-MCNC: 274 MG/DL (ref 70–99)
GLUCOSE BLDC GLUCOMTR-MCNC: 73 MG/DL (ref 70–99)
MAGNESIUM SERPL-MCNC: 2.4 MG/DL (ref 1.6–2.3)
POTASSIUM SERPL-SCNC: 4.2 MMOL/L (ref 3.4–5.3)

## 2018-04-28 PROCEDURE — 83735 ASSAY OF MAGNESIUM: CPT | Performed by: INTERNAL MEDICINE

## 2018-04-28 PROCEDURE — 25000125 ZZHC RX 250: Performed by: STUDENT IN AN ORGANIZED HEALTH CARE EDUCATION/TRAINING PROGRAM

## 2018-04-28 PROCEDURE — 25000132 ZZH RX MED GY IP 250 OP 250 PS 637: Mod: GY | Performed by: INTERNAL MEDICINE

## 2018-04-28 PROCEDURE — A9270 NON-COVERED ITEM OR SERVICE: HCPCS | Mod: GY | Performed by: STUDENT IN AN ORGANIZED HEALTH CARE EDUCATION/TRAINING PROGRAM

## 2018-04-28 PROCEDURE — 99231 SBSQ HOSP IP/OBS SF/LOW 25: CPT | Mod: GC | Performed by: INTERNAL MEDICINE

## 2018-04-28 PROCEDURE — 00000146 ZZHCL STATISTIC GLUCOSE BY METER IP

## 2018-04-28 PROCEDURE — A9270 NON-COVERED ITEM OR SERVICE: HCPCS | Mod: GY | Performed by: INTERNAL MEDICINE

## 2018-04-28 PROCEDURE — 25000132 ZZH RX MED GY IP 250 OP 250 PS 637: Mod: GY | Performed by: STUDENT IN AN ORGANIZED HEALTH CARE EDUCATION/TRAINING PROGRAM

## 2018-04-28 PROCEDURE — 74018 RADEX ABDOMEN 1 VIEW: CPT

## 2018-04-28 PROCEDURE — 25000128 H RX IP 250 OP 636: Performed by: INTERNAL MEDICINE

## 2018-04-28 PROCEDURE — 40000275 ZZH STATISTIC RCP TIME EA 10 MIN

## 2018-04-28 PROCEDURE — 25000132 ZZH RX MED GY IP 250 OP 250 PS 637

## 2018-04-28 PROCEDURE — A9270 NON-COVERED ITEM OR SERVICE: HCPCS | Mod: GY

## 2018-04-28 PROCEDURE — 90937 HEMODIALYSIS REPEATED EVAL: CPT

## 2018-04-28 PROCEDURE — 94640 AIRWAY INHALATION TREATMENT: CPT

## 2018-04-28 PROCEDURE — 84132 ASSAY OF SERUM POTASSIUM: CPT | Performed by: INTERNAL MEDICINE

## 2018-04-28 PROCEDURE — 21400006 ZZH R&B CCU INTERMEDIATE UMMC

## 2018-04-28 PROCEDURE — 25000128 H RX IP 250 OP 636: Performed by: PHYSICIAN ASSISTANT

## 2018-04-28 RX ORDER — DOXERCALCIFEROL 4 UG/2ML
0.5 INJECTION INTRAVENOUS
Status: COMPLETED | OUTPATIENT
Start: 2018-04-28 | End: 2018-04-28

## 2018-04-28 RX ORDER — AMOXICILLIN 250 MG
1 CAPSULE ORAL 2 TIMES DAILY
Status: DISCONTINUED | OUTPATIENT
Start: 2018-04-28 | End: 2018-06-05

## 2018-04-28 RX ORDER — POLYETHYLENE GLYCOL 3350 17 G/17G
17 POWDER, FOR SOLUTION ORAL DAILY
Status: DISCONTINUED | OUTPATIENT
Start: 2018-04-28 | End: 2018-06-05

## 2018-04-28 RX ORDER — HEPARIN SODIUM 1000 [USP'U]/ML
2.1 INJECTION, SOLUTION INTRAVENOUS; SUBCUTANEOUS ONCE
Status: COMPLETED | OUTPATIENT
Start: 2018-04-28 | End: 2018-04-28

## 2018-04-28 RX ORDER — ALBUTEROL SULFATE 0.83 MG/ML
2.5 SOLUTION RESPIRATORY (INHALATION) AT BEDTIME
Status: DISCONTINUED | OUTPATIENT
Start: 2018-04-28 | End: 2018-06-15

## 2018-04-28 RX ORDER — BISACODYL 10 MG
10 SUPPOSITORY, RECTAL RECTAL DAILY PRN
Status: DISCONTINUED | OUTPATIENT
Start: 2018-04-28 | End: 2018-06-15

## 2018-04-28 RX ORDER — HEPARIN SODIUM 1000 [USP'U]/ML
500 INJECTION, SOLUTION INTRAVENOUS; SUBCUTANEOUS
Status: COMPLETED | OUTPATIENT
Start: 2018-04-28 | End: 2018-04-28

## 2018-04-28 RX ORDER — HEPARIN SODIUM 1000 [USP'U]/ML
500 INJECTION, SOLUTION INTRAVENOUS; SUBCUTANEOUS CONTINUOUS
Status: DISCONTINUED | OUTPATIENT
Start: 2018-04-28 | End: 2018-04-29

## 2018-04-28 RX ADMIN — OMEPRAZOLE 20 MG: 20 CAPSULE, DELAYED RELEASE ORAL at 19:46

## 2018-04-28 RX ADMIN — DOXERCALCIFEROL 0.5 MCG: 4 INJECTION, SOLUTION INTRAVENOUS at 09:56

## 2018-04-28 RX ADMIN — CALCIUM ACETATE 667 MG: 667 CAPSULE ORAL at 14:13

## 2018-04-28 RX ADMIN — ALBUTEROL SULFATE 2.5 MG: 2.5 SOLUTION RESPIRATORY (INHALATION) at 22:55

## 2018-04-28 RX ADMIN — ALLOPURINOL 100 MG: 100 TABLET ORAL at 14:13

## 2018-04-28 RX ADMIN — CALCIUM ACETATE 667 MG: 667 CAPSULE ORAL at 07:57

## 2018-04-28 RX ADMIN — ATORVASTATIN CALCIUM 40 MG: 40 TABLET, FILM COATED ORAL at 19:46

## 2018-04-28 RX ADMIN — HEPARIN SODIUM 500 UNITS/HR: 1000 INJECTION, SOLUTION INTRAVENOUS; SUBCUTANEOUS at 09:54

## 2018-04-28 RX ADMIN — HEPARIN SODIUM 2100 UNITS: 1000 INJECTION, SOLUTION INTRAVENOUS; SUBCUTANEOUS at 13:32

## 2018-04-28 RX ADMIN — CALCIUM ACETATE 667 MG: 667 CAPSULE ORAL at 19:46

## 2018-04-28 RX ADMIN — INSULIN GLARGINE 10 UNITS: 100 INJECTION, SOLUTION SUBCUTANEOUS at 23:55

## 2018-04-28 RX ADMIN — INSULIN ASPART 6 UNITS: 100 INJECTION, SOLUTION INTRAVENOUS; SUBCUTANEOUS at 14:53

## 2018-04-28 RX ADMIN — ASPIRIN 81 MG CHEWABLE TABLET 81 MG: 81 TABLET CHEWABLE at 19:46

## 2018-04-28 RX ADMIN — SENNOSIDES AND DOCUSATE SODIUM 1 TABLET: 8.6; 5 TABLET ORAL at 19:48

## 2018-04-28 RX ADMIN — CHLORHEXIDINE GLUCONATE 15 ML: 1.2 RINSE ORAL at 07:58

## 2018-04-28 RX ADMIN — FLUTICASONE PROPIONATE 2 SPRAY: 50 SPRAY, METERED NASAL at 19:47

## 2018-04-28 RX ADMIN — POLYETHYLENE GLYCOL 3350 17 G: 17 POWDER, FOR SOLUTION ORAL at 18:33

## 2018-04-28 RX ADMIN — HEPARIN SODIUM 500 UNITS: 1000 INJECTION, SOLUTION INTRAVENOUS; SUBCUTANEOUS at 09:43

## 2018-04-28 RX ADMIN — Medication 1 CAPSULE: at 14:13

## 2018-04-28 RX ADMIN — SODIUM CHLORIDE 250 ML: 9 INJECTION, SOLUTION INTRAVENOUS at 09:43

## 2018-04-28 RX ADMIN — SODIUM CHLORIDE 300 ML: 9 INJECTION, SOLUTION INTRAVENOUS at 09:43

## 2018-04-28 RX ADMIN — CHLORHEXIDINE GLUCONATE 15 ML: 1.2 RINSE ORAL at 19:46

## 2018-04-28 RX ADMIN — MIDODRINE HYDROCHLORIDE 10 MG: 5 TABLET ORAL at 07:57

## 2018-04-28 NOTE — PROGRESS NOTES
[facility]   Cardiology II Service / Advanced Heart Failure  Daily Progress Note  Date of Service: 4/27/2018      Patient: Murray Nicholson  MRN: [id number]  Admission Date: 4/16/2018  Hospital Day # 12    Assessment and Plan:  Murray is a 63 year old male with a PMH of CAD, ICM (EF of 15-20%), ESRD, CKD Stage V now on hemodialysis (previously on peritoneal dialysis with peritonitis in January 2018), DM II, HTN, dyslipidemia, bicuspid aortic valve, severe MR, and proximal AAA who was admitted from the [facility] for decompensated HF. Staying in the hospital for combined heart/kidney transplant workup.    Chronic systolic heart failure secondary to ICM. Stage D  NYHA Class IIIB   ACEi/ARB: No deferred secondary to hypotension with previous attempts.  BB: No, deferred secondary to hypotension   Aldosterone antagonist: contraindicated due to renal dysfunction  SCD prophylaxis: decision deferred during medication uptitration  Fluid status: Euvolemic.   Anticoagulation: None.   Antiplatelet:  ASA 81 mg daily.   Sleep Apnea:  Doesn't tolerate CPAP.   Not using.  - Will touch base with kidney team regarding transplant status.  - Alb nebs at night for subjective dyspnea at night.       ESRD:  Currently getting dialysis on T, Th, Sa      DM II:  Last Hemoglobin A1C:  4/4:  6.3.  Home meds: Glipizide 5 mg daily.    -Continue high intensity SS insulin.        NSVT:  Telemetry, K > 4, Mg > 2.       Chronic Medical Issues:  CAD:  Coronary angiogram 2/8/17 showed no obstructive CAD.  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  Continue ASA 81 mg daily and Atorvastatin 40 mg daily.   Ascending aortic aneurysm:  4.5 x 4.5 cm proximal ascending aortic aneurysm seen on MRI done 2/14. Recent echo done 2/2/18 showed size of 4.2 cm.      Right maxillary Sinusitis:  Resolved.  CT of head: Right maxillary sinusitis.  7 day course of Doxycycline 100 mg twice daily completed.     FEN: 2 gm sodium  "diet  PROPHY:  Ambulation  LINES:  PIV  DISPO:  TBD based on hospital course  CODE STATUS:  Full    Patient seen and discussed with Dr. Blum.   Julia Haley DO, MS  Internal Medicine, PGY-2  Pager [phone]    ================================================================    Interval History/ROS: No overnight event.  Doing well.       ROS:  4 point ROS negative except per HPI.       Physical Exam:   /65  Pulse 108  Temp 98.6  F (37  C) (Oral)  Resp 19  Ht 1.727 m (5' 8\")  Wt 78.6 kg (173 lb 4.8 oz)  SpO2 92%  BMI 26.35 kg/m2    GENERAL: NAD   CV:   RESPIRATORY: Comfortable on RA  Neuro: Grossly non-focal  Psych: Grossly oriented    Drip doubtamine 2.5      Data:  No new data  "

## 2018-04-28 NOTE — PLAN OF CARE
Problem: Patient Care Overview  Goal: Discharge Needs Assessment  Outcome: No Change  D AVSS with sat's 99% on room air. Heart regular and lungs clear. Has voiced no c/o pain or nausea and has slept well during the night. Scheduled for HD today. Dobutamine running per order 2.5 mcg/kg/min.   I Vital's, assessment and med's per order.   A Resting in bed with call light in reach.   P Continue to monitor and update MD with changes.

## 2018-04-28 NOTE — PROGRESS NOTES
HEMODIALYSIS TREATMENT NOTE    Date: 4/28/2018  Time: 1300    Data:  Pre Wt: 78.6 kg   Desired Wt: 77.1 kg   Ultrafiltration - Post Run Net Total Removed (mL): 1500 mL  Ultrafiltration - Post Run Net Total Gain (mL): 0  Vascular Access Status: Yes, secured and visible  Dialyzer Rinse: light  Total Blood Volume Processed: 67.9 L  Total Dialysis (Treatment) Time:  3.5 hours      Assessment/Interventions: 3.5 hour HD run.  Blood flow 350 mL/min.  1.5 kg removed.  Pt sinus tach on 2.5mcg/kg/min Dobutamine.  BP stable.  2.1 mL Heparin instilled into both lines of dialysis catheter.  Report called.       Plan:  Continue with plan of care.  Next run per Renal Team.

## 2018-04-28 NOTE — PROGRESS NOTES
"S:  Comfortable and in good spirits today.  No SOB, CP, edema, nausea, vomiting, or pain complaints.  Awaiting decision to determine if will receive combined transplant.    O:    Vital signs:  Temp: 97.9  F (36.6  C) Temp src: Oral BP: 97/67 Pulse: 108 Heart Rate: 109 Resp: 16 SpO2: 99 % O2 Device: None (Room air) Oxygen Delivery: 1 LPM Height: 172.7 cm (5' 8\") Weight: 78.6 kg (173 lb 4.8 oz)  Estimated body mass index is 26.35 kg/(m^2) as calculated from the following:    Height as of this encounter: 1.727 m (5' 8\").    Weight as of this encounter: 78.6 kg (173 lb 4.8 oz).      Intake/Output Summary (Last 24 hours) at 04/28/18 0901  Last data filed at 04/28/18 0800   Gross per 24 hour   Intake           965.65 ml   Output                0 ml   Net           965.65 ml     PE  Constitutional: alert and no distress   Cardiovascular: No edema or JVD. negative findings: S1 normal, S2 normal  Respiratory: negative findings: normal respiratory rate and rhythm, lungs clear to auscultation  Psychiatric: mentation appears normal and affect normal/bright  Head: normocephalic, atraumatic  Abdomen: Abdomen soft, non-tender. BS normal. No masses, organomegaly  SKIN: no suspicious lesions or rashes    Labs: reflect ESRD on HD.  Stable anemia of CKD    A/P      Murray Nicholson is a 63 yr old male with PMH of HTN, DMII, functionally bicuspid aortic valve, CHF/CM (EF 10-15%), ESRD, admitted with worsening dyspnea/SOB.      ESKD: due to DMII, on PD since Aug 2017, changed to iHD 1/2018 due to polymicrobial and fungal peritonitis, had been on TTS schedule at [facility] under care of Dr Mcpherson. Access: tunneled RIJ (replaced 4/17/18). Run time: 4 hrs; EDW 77 kg.  - Dialysis per TTS schedule   - Heparin lock CVC  -target 2-3 kg today as tolerated    CHF/CM:  -Pending decision regarding combined heart/kidney transplant.  - mgmt per primary team.    Kat Hamlin PA-C        "

## 2018-04-28 NOTE — PLAN OF CARE
"Problem: Patient Care Overview  Goal: Plan of Care/Patient Progress Review  Outcome: No Change  D: Patient admitted 4/16/18 from heart failure clinic follow-up appointment and transferred due to respiratory distress. Evaluation for LVAD/heart/kidney transplant in process. Hx of HF (EF 10%), severe AI and MR, ESRD on HemoDialysis (T, TH, Sat), and ascending aortic aneurysm.     I/A: Monitored vitals and assessed patient status. A0x4. VSS. Sinus tachycardia 100s-110s. Afebrile. Room air. Oliguric. Dialysis today - Removed 1.5L.  Abdominal discomfort/emesis post-dialysis. Abdominal x-ray showed \"moderate to large colonic stool burden\". Miralax and PRN milk of mag/suppository ordered. Ambulating in room independently.     Running: Dobutamine 2.5 mcg/kg/min     PRN: Dulcolax suppository for constipation. Midodrine prior to dialysis treatment.     P: Continue to monitor patient status and report changes to treatment team. Plan to continue LVAD/heart/kidney tx evaluation.       "

## 2018-04-29 LAB
BASOPHILS # BLD AUTO: 0.1 10E9/L (ref 0–0.2)
BASOPHILS NFR BLD AUTO: 1 %
DIFFERENTIAL METHOD BLD: ABNORMAL
EOSINOPHIL # BLD AUTO: 0.2 10E9/L (ref 0–0.7)
EOSINOPHIL NFR BLD AUTO: 4.3 %
ERYTHROCYTE [DISTWIDTH] IN BLOOD BY AUTOMATED COUNT: 15.3 % (ref 10–15)
GLUCOSE BLDC GLUCOMTR-MCNC: 128 MG/DL (ref 70–99)
GLUCOSE BLDC GLUCOMTR-MCNC: 204 MG/DL (ref 70–99)
GLUCOSE BLDC GLUCOMTR-MCNC: 217 MG/DL (ref 70–99)
GLUCOSE BLDC GLUCOMTR-MCNC: 220 MG/DL (ref 70–99)
GLUCOSE BLDC GLUCOMTR-MCNC: 74 MG/DL (ref 70–99)
GLUCOSE BLDC GLUCOMTR-MCNC: 77 MG/DL (ref 70–99)
GLUCOSE BLDC GLUCOMTR-MCNC: 96 MG/DL (ref 70–99)
HCT VFR BLD AUTO: 34.6 % (ref 40–53)
HGB BLD-MCNC: 10.7 G/DL (ref 13.3–17.7)
IMM GRANULOCYTES # BLD: 0 10E9/L (ref 0–0.4)
IMM GRANULOCYTES NFR BLD: 0.2 %
LYMPHOCYTES # BLD AUTO: 1 10E9/L (ref 0.8–5.3)
LYMPHOCYTES NFR BLD AUTO: 19.5 %
MAGNESIUM SERPL-MCNC: 1.9 MG/DL (ref 1.6–2.3)
MCH RBC QN AUTO: 30.6 PG (ref 26.5–33)
MCHC RBC AUTO-ENTMCNC: 30.9 G/DL (ref 31.5–36.5)
MCV RBC AUTO: 99 FL (ref 78–100)
MONOCYTES # BLD AUTO: 0.3 10E9/L (ref 0–1.3)
MONOCYTES NFR BLD AUTO: 6.5 %
NEUTROPHILS # BLD AUTO: 3.5 10E9/L (ref 1.6–8.3)
NEUTROPHILS NFR BLD AUTO: 68.5 %
NRBC # BLD AUTO: 0 10*3/UL
NRBC BLD AUTO-RTO: 0 /100
PLATELET # BLD AUTO: 185 10E9/L (ref 150–450)
RBC # BLD AUTO: 3.5 10E12/L (ref 4.4–5.9)
WBC # BLD AUTO: 5.1 10E9/L (ref 4–11)

## 2018-04-29 PROCEDURE — 40000275 ZZH STATISTIC RCP TIME EA 10 MIN

## 2018-04-29 PROCEDURE — 36415 COLL VENOUS BLD VENIPUNCTURE: CPT | Performed by: INTERNAL MEDICINE

## 2018-04-29 PROCEDURE — 25000132 ZZH RX MED GY IP 250 OP 250 PS 637: Mod: GY | Performed by: INTERNAL MEDICINE

## 2018-04-29 PROCEDURE — 94640 AIRWAY INHALATION TREATMENT: CPT

## 2018-04-29 PROCEDURE — A9270 NON-COVERED ITEM OR SERVICE: HCPCS | Mod: GY | Performed by: INTERNAL MEDICINE

## 2018-04-29 PROCEDURE — A9270 NON-COVERED ITEM OR SERVICE: HCPCS | Mod: GY

## 2018-04-29 PROCEDURE — 25000132 ZZH RX MED GY IP 250 OP 250 PS 637: Mod: GY

## 2018-04-29 PROCEDURE — 83735 ASSAY OF MAGNESIUM: CPT | Performed by: INTERNAL MEDICINE

## 2018-04-29 PROCEDURE — 40000802 ZZH SITE CHECK

## 2018-04-29 PROCEDURE — 25000125 ZZHC RX 250: Performed by: NURSE PRACTITIONER

## 2018-04-29 PROCEDURE — 00000146 ZZHCL STATISTIC GLUCOSE BY METER IP

## 2018-04-29 PROCEDURE — 85025 COMPLETE CBC W/AUTO DIFF WBC: CPT | Performed by: INTERNAL MEDICINE

## 2018-04-29 PROCEDURE — A9270 NON-COVERED ITEM OR SERVICE: HCPCS | Mod: GY | Performed by: STUDENT IN AN ORGANIZED HEALTH CARE EDUCATION/TRAINING PROGRAM

## 2018-04-29 PROCEDURE — 21400006 ZZH R&B CCU INTERMEDIATE UMMC

## 2018-04-29 PROCEDURE — 99231 SBSQ HOSP IP/OBS SF/LOW 25: CPT | Mod: GC | Performed by: INTERNAL MEDICINE

## 2018-04-29 PROCEDURE — 25000125 ZZHC RX 250: Performed by: STUDENT IN AN ORGANIZED HEALTH CARE EDUCATION/TRAINING PROGRAM

## 2018-04-29 PROCEDURE — 40000982 ZZH STATISTICAL HAIKU-CANTO PHOTO

## 2018-04-29 PROCEDURE — 25000132 ZZH RX MED GY IP 250 OP 250 PS 637: Mod: GY | Performed by: STUDENT IN AN ORGANIZED HEALTH CARE EDUCATION/TRAINING PROGRAM

## 2018-04-29 RX ADMIN — Medication 2 G: at 08:08

## 2018-04-29 RX ADMIN — CHLORHEXIDINE GLUCONATE 15 ML: 1.2 RINSE ORAL at 20:25

## 2018-04-29 RX ADMIN — OMEPRAZOLE 20 MG: 20 CAPSULE, DELAYED RELEASE ORAL at 20:25

## 2018-04-29 RX ADMIN — INSULIN GLARGINE 10 UNITS: 100 INJECTION, SOLUTION SUBCUTANEOUS at 23:33

## 2018-04-29 RX ADMIN — CALCIUM ACETATE 667 MG: 667 CAPSULE ORAL at 08:01

## 2018-04-29 RX ADMIN — SENNOSIDES AND DOCUSATE SODIUM 1 TABLET: 8.6; 5 TABLET ORAL at 08:02

## 2018-04-29 RX ADMIN — ALBUTEROL SULFATE 2.5 MG: 2.5 SOLUTION RESPIRATORY (INHALATION) at 20:55

## 2018-04-29 RX ADMIN — Medication 1 CAPSULE: at 08:02

## 2018-04-29 RX ADMIN — ASPIRIN 81 MG CHEWABLE TABLET 81 MG: 81 TABLET CHEWABLE at 20:24

## 2018-04-29 RX ADMIN — CALCIUM ACETATE 667 MG: 667 CAPSULE ORAL at 20:25

## 2018-04-29 RX ADMIN — FLUTICASONE PROPIONATE 2 SPRAY: 50 SPRAY, METERED NASAL at 20:26

## 2018-04-29 RX ADMIN — ALLOPURINOL 100 MG: 100 TABLET ORAL at 08:02

## 2018-04-29 RX ADMIN — POLYETHYLENE GLYCOL 3350 17 G: 17 POWDER, FOR SOLUTION ORAL at 20:24

## 2018-04-29 RX ADMIN — CHLORHEXIDINE GLUCONATE 15 ML: 1.2 RINSE ORAL at 08:02

## 2018-04-29 RX ADMIN — INSULIN ASPART 3 UNITS: 100 INJECTION, SOLUTION INTRAVENOUS; SUBCUTANEOUS at 13:45

## 2018-04-29 RX ADMIN — SENNOSIDES AND DOCUSATE SODIUM 1 TABLET: 8.6; 5 TABLET ORAL at 20:25

## 2018-04-29 RX ADMIN — CALCIUM ACETATE 667 MG: 667 CAPSULE ORAL at 13:07

## 2018-04-29 RX ADMIN — ATORVASTATIN CALCIUM 40 MG: 40 TABLET, FILM COATED ORAL at 20:24

## 2018-04-29 NOTE — PLAN OF CARE
Problem: Patient Care Overview  Goal: Plan of Care/Patient Progress Review  Outcome: No Change  D: Patient admitted 4/16/18 from heart failure clinic follow-up appointment and transferred due to respiratory distress. Evaluation for LVAD/heart/kidney transplant in process. Hx of HF (EF 10%), severe AI and MR, ESRD on HemoDialysis (T, TH, Sat), and ascending aortic aneurysm.      I/A: Monitored vitals and assessed patient status. A0x4. VSS. Sinus tachycardia 100s-110s. Afebrile. Room air. Oliguric. Mag 1.9 - provided 2 g IV Magnesium supplement per order. Ambulating in room independently. Moving bowels regularly. No c/o pain or N&V.      Running: Dobutamine 2.5 mcg/kg/min      PRN: Midodrine prior to dialysis treatment (if indicated).      P: Continue to monitor patient status and report changes to treatment team. Plan to continue LVAD/heart/kidney tx evaluation.

## 2018-04-29 NOTE — PROGRESS NOTES
[facility]   Cardiology II Service / Advanced Heart Failure  Daily Progress Note  Date of Service: 4/27/2018      Patient: Murray Nicholson  MRN: [id number]  Admission Date: 4/16/2018  Hospital Day # 13    Assessment and Plan:  Murray is a 63 year old male with a PMH of CAD, ICM (EF of 15-20%), ESRD, CKD Stage V now on hemodialysis (previously on peritoneal dialysis with peritonitis in January 2018), DM II, HTN, dyslipidemia, bicuspid aortic valve, severe MR, and proximal AAA who was admitted from the [facility] for decompensated HF. Staying in the hospital for combined heart/kidney transplant workup.    Chronic systolic heart failure secondary to ICM. Stage D  NYHA Class IIIB   ACEi/ARB: No deferred secondary to hypotension with previous attempts.  BB: No, deferred secondary to hypotension   Aldosterone antagonist: contraindicated due to renal dysfunction  SCD prophylaxis: decision deferred during medication uptitration  Fluid status: Euvolemic.   Anticoagulation: None.   Antiplatelet:  ASA 81 mg daily.   Sleep Apnea:  Doesn't tolerate CPAP.   Not using.  - Alb nebs at night for subjective dyspnea at night.   - Will touch base with kidney team regarding transplant status on Monday.      ESRD:  Currently getting dialysis on T, Th, Sa      DM II:  Last Hemoglobin A1C:  4/4:  6.3.  Home meds: Glipizide 5 mg daily.    -Continue high intensity SS insulin.        NSVT:  Telemetry, K > 4, Mg > 2.       Chronic Medical Issues:  CAD:  Coronary angiogram 2/8/17 showed no obstructive CAD.  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  Continue ASA 81 mg daily and Atorvastatin 40 mg daily.   Ascending aortic aneurysm:  4.5 x 4.5 cm proximal ascending aortic aneurysm seen on MRI done 2/14. Recent echo done 2/2/18 showed size of 4.2 cm.      Right maxillary Sinusitis:  Resolved.  CT of head: Right maxillary sinusitis.  7 day course of Doxycycline 100 mg twice daily completed.     FEN: 2 gm sodium  "diet  PROPHY:  Ambulation  LINES:  PIV  DISPO:  TBD based on hospital course  CODE STATUS:  Full    Patient seen and discussed with Dr. Blum.   Julia Haley DO, MS  Internal Medicine, PGY-2  Pager [phone]    ================================================================    Interval History/ROS: No overnight event.  Doing well.     ROS:  4 point ROS negative except per HPI.       Physical Exam:   /73 (BP Location: Right arm)  Pulse 108  Temp 98.7  F (37.1  C) (Oral)  Resp 18  Ht 1.727 m (5' 8\")  Wt 79 kg (174 lb 1.6 oz)  SpO2 100%  BMI 26.47 kg/m2    GENERAL: NAD   CV:   RESPIRATORY: Comfortable on RA  Neuro: Grossly non-focal  Psych: Grossly oriented    Drip doubtamine 2.5      Data:  No new data  "

## 2018-04-29 NOTE — PLAN OF CARE
Problem: Patient Care Overview  Goal: Discharge Needs Assessment  Outcome: No Change  D AVSS with sat's 100% on room air. Heart regular/tachy and lungs clear. Has voiced no c/o pain or nausea to this nurse. At shift start patient reported that he had an extra large formed BM and reports feeling much better. Up to bathroom independently to void an adequate amount. Does not want to be disturbed until 0630 for his lab draw and blood glucose check.   I Vital's, assessment and med's per order.   A Resting in bed with call light in reach.   P Continue to monitor and update MD with changes.

## 2018-04-29 NOTE — INTERIM SUMMARY
No acute issues reported overnight.  Tolerated 1.5 kg removed with HD yesterday. Afebrile on RA.  Stable hypotension of severe heart failure on Dobutamine drip. Please draw labs today or tomorrow. Next iHD 5/1.    Heart and kidney transplant to determine if will be listed.    Kat Hamlin PA-C  Pager [phone]

## 2018-04-30 ENCOUNTER — APPOINTMENT (OUTPATIENT)
Dept: OCCUPATIONAL THERAPY | Facility: CLINIC | Age: 63
DRG: 001 | End: 2018-04-30
Attending: INTERNAL MEDICINE
Payer: COMMERCIAL

## 2018-04-30 LAB
ANION GAP SERPL CALCULATED.3IONS-SCNC: 8 MMOL/L (ref 3–14)
BUN SERPL-MCNC: 27 MG/DL (ref 7–30)
CALCIUM SERPL-MCNC: 8.9 MG/DL (ref 8.5–10.1)
CHLORIDE SERPL-SCNC: 96 MMOL/L (ref 94–109)
CO2 SERPL-SCNC: 31 MMOL/L (ref 20–32)
CREAT SERPL-MCNC: 6.3 MG/DL (ref 0.66–1.25)
ERYTHROCYTE [DISTWIDTH] IN BLOOD BY AUTOMATED COUNT: 15.2 % (ref 10–15)
GFR SERPL CREATININE-BSD FRML MDRD: 9 ML/MIN/1.7M2
GLUCOSE BLDC GLUCOMTR-MCNC: 105 MG/DL (ref 70–99)
GLUCOSE BLDC GLUCOMTR-MCNC: 134 MG/DL (ref 70–99)
GLUCOSE BLDC GLUCOMTR-MCNC: 171 MG/DL (ref 70–99)
GLUCOSE BLDC GLUCOMTR-MCNC: 216 MG/DL (ref 70–99)
GLUCOSE BLDC GLUCOMTR-MCNC: 84 MG/DL (ref 70–99)
GLUCOSE SERPL-MCNC: 101 MG/DL (ref 70–99)
HCT VFR BLD AUTO: 31.4 % (ref 40–53)
HGB BLD-MCNC: 10.1 G/DL (ref 13.3–17.7)
MAGNESIUM SERPL-MCNC: 2.3 MG/DL (ref 1.6–2.3)
MCH RBC QN AUTO: 31.3 PG (ref 26.5–33)
MCHC RBC AUTO-ENTMCNC: 32.2 G/DL (ref 31.5–36.5)
MCV RBC AUTO: 97 FL (ref 78–100)
PLATELET # BLD AUTO: 201 10E9/L (ref 150–450)
POTASSIUM SERPL-SCNC: 4.7 MMOL/L (ref 3.4–5.3)
RBC # BLD AUTO: 3.23 10E12/L (ref 4.4–5.9)
SODIUM SERPL-SCNC: 135 MMOL/L (ref 133–144)
WBC # BLD AUTO: 5.5 10E9/L (ref 4–11)

## 2018-04-30 PROCEDURE — A9270 NON-COVERED ITEM OR SERVICE: HCPCS | Mod: GY

## 2018-04-30 PROCEDURE — 80048 BASIC METABOLIC PNL TOTAL CA: CPT | Performed by: INTERNAL MEDICINE

## 2018-04-30 PROCEDURE — 85027 COMPLETE CBC AUTOMATED: CPT | Performed by: INTERNAL MEDICINE

## 2018-04-30 PROCEDURE — 97110 THERAPEUTIC EXERCISES: CPT | Mod: GO | Performed by: OCCUPATIONAL THERAPIST

## 2018-04-30 PROCEDURE — 40000275 ZZH STATISTIC RCP TIME EA 10 MIN

## 2018-04-30 PROCEDURE — A9270 NON-COVERED ITEM OR SERVICE: HCPCS | Mod: GY | Performed by: INTERNAL MEDICINE

## 2018-04-30 PROCEDURE — 40000133 ZZH STATISTIC OT WARD VISIT: Performed by: OCCUPATIONAL THERAPIST

## 2018-04-30 PROCEDURE — 21400006 ZZH R&B CCU INTERMEDIATE UMMC

## 2018-04-30 PROCEDURE — 25000125 ZZHC RX 250: Performed by: STUDENT IN AN ORGANIZED HEALTH CARE EDUCATION/TRAINING PROGRAM

## 2018-04-30 PROCEDURE — 25000132 ZZH RX MED GY IP 250 OP 250 PS 637: Performed by: STUDENT IN AN ORGANIZED HEALTH CARE EDUCATION/TRAINING PROGRAM

## 2018-04-30 PROCEDURE — 85027 COMPLETE CBC AUTOMATED: CPT | Performed by: NURSE PRACTITIONER

## 2018-04-30 PROCEDURE — 25000132 ZZH RX MED GY IP 250 OP 250 PS 637: Mod: GY | Performed by: INTERNAL MEDICINE

## 2018-04-30 PROCEDURE — 25000128 H RX IP 250 OP 636: Performed by: INTERNAL MEDICINE

## 2018-04-30 PROCEDURE — 83735 ASSAY OF MAGNESIUM: CPT | Performed by: INTERNAL MEDICINE

## 2018-04-30 PROCEDURE — 94640 AIRWAY INHALATION TREATMENT: CPT

## 2018-04-30 PROCEDURE — 99233 SBSQ HOSP IP/OBS HIGH 50: CPT | Mod: 25 | Performed by: NURSE PRACTITIONER

## 2018-04-30 PROCEDURE — A9270 NON-COVERED ITEM OR SERVICE: HCPCS | Mod: GY | Performed by: STUDENT IN AN ORGANIZED HEALTH CARE EDUCATION/TRAINING PROGRAM

## 2018-04-30 PROCEDURE — 25000132 ZZH RX MED GY IP 250 OP 250 PS 637: Mod: GY

## 2018-04-30 PROCEDURE — 94640 AIRWAY INHALATION TREATMENT: CPT | Mod: 76

## 2018-04-30 PROCEDURE — 00000146 ZZHCL STATISTIC GLUCOSE BY METER IP

## 2018-04-30 RX ADMIN — ASPIRIN 81 MG CHEWABLE TABLET 81 MG: 81 TABLET CHEWABLE at 20:06

## 2018-04-30 RX ADMIN — CALCIUM ACETATE 667 MG: 667 CAPSULE ORAL at 12:12

## 2018-04-30 RX ADMIN — ALLOPURINOL 100 MG: 100 TABLET ORAL at 08:14

## 2018-04-30 RX ADMIN — SENNOSIDES AND DOCUSATE SODIUM 1 TABLET: 8.6; 5 TABLET ORAL at 08:14

## 2018-04-30 RX ADMIN — INSULIN GLARGINE 10 UNITS: 100 INJECTION, SOLUTION SUBCUTANEOUS at 21:06

## 2018-04-30 RX ADMIN — INSULIN ASPART 4 UNITS: 100 INJECTION, SOLUTION INTRAVENOUS; SUBCUTANEOUS at 14:23

## 2018-04-30 RX ADMIN — CHLORHEXIDINE GLUCONATE 15 ML: 1.2 RINSE ORAL at 08:14

## 2018-04-30 RX ADMIN — CHLORHEXIDINE GLUCONATE 15 ML: 1.2 RINSE ORAL at 20:06

## 2018-04-30 RX ADMIN — CALCIUM ACETATE 667 MG: 667 CAPSULE ORAL at 08:15

## 2018-04-30 RX ADMIN — CALCIUM ACETATE 667 MG: 667 CAPSULE ORAL at 19:22

## 2018-04-30 RX ADMIN — ALBUTEROL SULFATE 2.5 MG: 2.5 SOLUTION RESPIRATORY (INHALATION) at 20:50

## 2018-04-30 RX ADMIN — POLYETHYLENE GLYCOL 3350 17 G: 17 POWDER, FOR SOLUTION ORAL at 08:15

## 2018-04-30 RX ADMIN — Medication 1 CAPSULE: at 08:14

## 2018-04-30 RX ADMIN — OMEPRAZOLE 20 MG: 20 CAPSULE, DELAYED RELEASE ORAL at 20:06

## 2018-04-30 RX ADMIN — FLUTICASONE PROPIONATE 2 SPRAY: 50 SPRAY, METERED NASAL at 20:07

## 2018-04-30 RX ADMIN — ATORVASTATIN CALCIUM 40 MG: 40 TABLET, FILM COATED ORAL at 20:06

## 2018-04-30 RX ADMIN — DOBUTAMINE HYDROCHLORIDE 2.5 MCG/KG/MIN: 400 INJECTION INTRAVENOUS at 21:01

## 2018-04-30 NOTE — PLAN OF CARE
Problem: Patient Care Overview  Goal: Individualization & Mutuality    Patient alert and oriented x 4, pleasant and cooperative with nursing cares and medications. He denies pain. Appetite fair. Blood glucoses 105 and 216 this shift. He is in sinus tach. Dobutamine drip is infusing continuously at 2.5 mcg/Kg/minute into his left double lumen PICC line. Right double lumen dialysis line is patent. Patient napped all morning, said he didn't get enough sleep last night. He was awake and independent in ADL this afternoon. He ambulated to the physical therapy room with the physical therapist this afternoon. He is in sinus tach on ECG. Blood glucoses this shift were 105 and 216. Insulin coverage was given. Refer to doc flow sheets, MAR, and notes for other specifics. Continue nursing cares per care plan and report concerns or changes to the doctors.

## 2018-04-30 NOTE — PLAN OF CARE
"Problem: Patient Care Overview  Goal: Plan of Care/Patient Progress Review  Outcome: No Change  /78 (BP Location: Right arm)  Pulse 108  Temp 98.4  F (36.9  C) (Oral)  Resp 18  Ht 1.727 m (5' 8\")  Wt 79.2 kg (174 lb 8 oz)  SpO2 100%  BMI 26.53 kg/m2    A&Ox4. VSS on RA. Sinus tach. Denies pain, nausea, or SOB. Up ad edith. 2g Na diet. Good appetite. BM x1. Oliguric on HD. Dobutamine continues at 2.5 mcg/kg/min. Pt calls appropriately and is able to make needs known. He appeared to rest comfortably between cares. Will continue to monitor and follow POC.     Problem: Diabetes Comorbidity  Goal: Diabetes  Patient comorbidity will be monitored for signs and symptoms of hyperglycemia or hypoglycemia. Problems will be absent, minimized or managed by discharge/transition of care.   Outcome: No Change  BG at HS was 220. 134 and 101 in the middle of the night. Covering with sliding scale and carb coverage.       "

## 2018-04-30 NOTE — PROGRESS NOTES
[facility]   Cardiology II Service / Advanced Heart Failure  Daily Progress Note  Date of Service: 4/30/2018      Patient: Murray Nicholson  MRN: [id number]  Admission Date: 4/16/2018  Hospital Day # 14    Assessment and Plan: Murray Nicholson is a 63 year old male DM Type II, SHEELA, GERD, HTN, Dyslipidemia, Severe AI, Severe MR, ESRD on HD, ICM with EF 15-20%. He presented per [facility] for decompensated heart failure.       ICM. 3/15/18 Echo notes EF 15-20%, mild-moderately reduced RV function, bicuspid AV with severe holodiastolic AI, and torn chordae to A3 P3 MR. NM Lexiscan 1/12/16 negative for ischemia. LHC 2/8/17 with no obstructive CAD. CPX 3/30/18 consistent with RER 1.23, VEVCO2 slope of 68, with FC of 31%. Secondary to ischemic and valvular etiology. RHC 4/20/18 mRA-1, mPA-27, mPCW-10, BECKA CO-3.17, and BECKA CI-1.68.   Stage D, NYHA Class IIIB  ACEi/ARB Held in setting of symptomatic hypotension.   BB contraindicated due to low cardiac output. Dobutamine 2.5 mcg/kg/min.   Aldosterone antagonist: Contraindicated due to renal dysfunction  SCD prophylaxis Defer pending transplant workup.   Fluid status: Euvolemic. Management per HD.   Sleep Apnea: CPAP.   - Plan for RHC in AM to assess output on inotropes.     Constipation. 4/28/18 Abdominal X-ray consistent with nonobstructive bowel gas pattern and moderate to large colonic stool. No leukocytosis without fever.   - Senakot 1 tab po BID.   - Miralax 17g daily      ESRD on HD. Hyperkalemia resolved. K 6.2 4/17 shifted with insulin given issue with tunneled line. S/p IR tunneled line exchange 4/17/18.  - Appreciate consult per Nephrology.   - HD Tuesday, Thursday, and Saturday.   - Midodrine 10 mg po prior to dialysis.   - Discussed renal transplant with Nephrology today and awaiting response.       Severe AI and MR. Suboptimal candidate for valve surgery in setting of heart failure.   - Appreciate Surgical consult, await committee presentation next  "week.       Pancreatic lesion. 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 GI curbside appreciated. Pseudocyt vs IPMN.   - Limited Abdominal MRI, radiology to review limited images today.       Chronic Medical Conditions:  GERD. Prilosec.   DM Type II. Novolog increased to high intensity SSI. Lantus 10 units daily. BG stable.   AAA. 4.5 x 4.5 cm proximal ascending aortic aneurysm seen on MRI done 2/14. Recent echo 2/2/18 showed size of 4.2 cm.   CAD. BB contraindicated in low output state. ASA 81 mg po daily and Lipitor.   Right maxillary sinusitis. CT Head consistent with right maxillary sinusitis with current complaints of PND. Completed a 7 day course of Doxycyline.       FEN: 2 gram NA diet   PROPHY: Prilosec  LINES: PIV  DISPO:  TBD.   CODE STATUS: Full Code  ================================================================    Interval History/ROS: He complains of mild abdominal tenderness, abdominal bloating, and LE edema. He complains of constipation with very small BM yesterday. He denies fever, chills, chest pain, palpitations, SOB, PND, MEADE, cough, nausea, vomiting, and diarrhea. He is tolerating po intake and ambulation at this time.     Last 24 hr care team notes reviewed.   ROS:  4 point ROS including Respiratory, CV, GI and , other than that noted in the HPI, is negative.     Medications: Reviewed in EPIC.     Physical Exam:   BP 98/65 (BP Location: Right arm)  Pulse 108  Temp 98.5  F (36.9  C) (Oral)  Resp 18  Ht 1.727 m (5' 8\")  Wt 79.2 kg (174 lb 8 oz)  SpO2 100%  BMI 26.53 kg/m2  GENERAL: Appears alert and oriented times three.   HEENT: Eye symmetrical and free of discharge bilaterally. Mucous membranes moist and without lesions.  NECK: Supple and without lymphadenopathy. JVD at lower 1/3 of neck upright.   CV: RRR, S1S2 present with III/VI murmur heard best at LSB.   RESPIRATORY: Respirations regular, even, and unlabored. Lungs CTA throughout.   GI: " Soft and mildly distended with normoactive bowel sounds present in all quadrants. No rebound tenderness or guarding. Tenderness throughout, most prominent to lower quadrants. No organomegaly.   EXTREMITIES: +1 bilateral LE edema. 2+ bilateral pedal pulses.   NEUROLOGIC: Alert and orientated x 3. CN II-XII grossly intact. No focal deficits.   MUSCULOSKELETAL: No joint swelling or tenderness.   SKIN: No jaundice. No rashes or lesions.     Data:  CMP  Recent Labs  Lab 04/30/18 0445 04/29/18  0715 04/28/18  0650 04/27/18  0655 04/26/18  1420  04/25/18  1121  04/24/18  0530     --   --  135  --   --  135  --  137   POTASSIUM 4.7  --  4.2 4.4 3.7  --  4.6  --  4.5   CHLORIDE 96  --   --  96  --   --  99  --  98   CO2 31  --   --  28  --   --  28  --  29   ANIONGAP 8  --   --  10  --   --  8  --  10   *  --   --  214*  --   --  230*  --  101*   BUN 27  --   --  20  --   --  23  --  38*   CR 6.30*  --   --  2.77*  --   --  6.00*  --  8.76*   GFRESTIMATED 9*  --   --  23*  --   --  10*  --  6*   GFRESTBLACK 11*  --   --  28*  --   --  12*  --  7*   TRINI 8.9  --   --  8.5  --   --  8.7  --  8.7   MAG 2.3 1.9 2.4* 2.2 1.7  < > 2.4*  < > 1.4*   < > = values in this interval not displayed.  CBC  Recent Labs  Lab 04/30/18 0445 04/29/18  0715 04/24/18  0530   WBC 5.5 5.1 4.6   RBC 3.23* 3.50* 3.49*   HGB 10.1* 10.7* 11.0*   HCT 31.4* 34.6* 34.4*   MCV 97 99 99   MCH 31.3 30.6 31.5   MCHC 32.2 30.9* 32.0   RDW 15.2* 15.3* 15.7*    185 194     Patient seen and discussed with Dr. Blum.    Jennifer FISHER  4/30/2018

## 2018-04-30 NOTE — PLAN OF CARE
Problem: Patient Care Overview  Goal: Plan of Care/Patient Progress Review  Discharge Planner OT   Patient plan for discharge: Not discussed  Current status: Pt tolerated 10 min on Nustep with MET level of 2.5 and moderate fatigue; also completed several BUE calisthenic exercises while standing. Pt's -118, pre /69, post /62, O2 sats >90% on RA.   Barriers to return to prior living situation: medical needs  Recommendations for discharge: pending surgery/hospital course; pt currently appropriate to discharge home with OP therapy from a rehab perspective  Rationale for recommendations: to progress strength/endurance for ADL/IADLs       Entered by: Kenna Prince 04/30/2018 3:12 PM

## 2018-05-01 ENCOUNTER — APPOINTMENT (OUTPATIENT)
Dept: CARDIOLOGY | Facility: CLINIC | Age: 63
DRG: 001 | End: 2018-05-01
Attending: NURSE PRACTITIONER
Payer: COMMERCIAL

## 2018-05-01 LAB
ANION GAP SERPL CALCULATED.3IONS-SCNC: 8 MMOL/L (ref 3–14)
BUN SERPL-MCNC: 36 MG/DL (ref 7–30)
CALCIUM SERPL-MCNC: 9.7 MG/DL (ref 8.5–10.1)
CHLORIDE SERPL-SCNC: 94 MMOL/L (ref 94–109)
CO2 SERPL-SCNC: 31 MMOL/L (ref 20–32)
CREAT SERPL-MCNC: 8.51 MG/DL (ref 0.66–1.25)
ERYTHROCYTE [DISTWIDTH] IN BLOOD BY AUTOMATED COUNT: 15.3 % (ref 10–15)
GFR SERPL CREATININE-BSD FRML MDRD: 6 ML/MIN/1.7M2
GLUCOSE BLDC GLUCOMTR-MCNC: 107 MG/DL (ref 70–99)
GLUCOSE BLDC GLUCOMTR-MCNC: 203 MG/DL (ref 70–99)
GLUCOSE BLDC GLUCOMTR-MCNC: 93 MG/DL (ref 70–99)
GLUCOSE SERPL-MCNC: 100 MG/DL (ref 70–99)
HCT VFR BLD AUTO: 31.5 % (ref 40–53)
HGB BLD-MCNC: 10 G/DL (ref 13.3–17.7)
MAGNESIUM SERPL-MCNC: 2.4 MG/DL (ref 1.6–2.3)
MCH RBC QN AUTO: 30.9 PG (ref 26.5–33)
MCHC RBC AUTO-ENTMCNC: 31.7 G/DL (ref 31.5–36.5)
MCV RBC AUTO: 97 FL (ref 78–100)
PHOSPHATE SERPL-MCNC: 3.2 MG/DL (ref 2.5–4.5)
PLATELET # BLD AUTO: 185 10E9/L (ref 150–450)
POTASSIUM SERPL-SCNC: 5 MMOL/L (ref 3.4–5.3)
RBC # BLD AUTO: 3.24 10E12/L (ref 4.4–5.9)
SODIUM SERPL-SCNC: 133 MMOL/L (ref 133–144)
WBC # BLD AUTO: 4.6 10E9/L (ref 4–11)

## 2018-05-01 PROCEDURE — 25000132 ZZH RX MED GY IP 250 OP 250 PS 637: Performed by: INTERNAL MEDICINE

## 2018-05-01 PROCEDURE — 80048 BASIC METABOLIC PNL TOTAL CA: CPT | Performed by: INTERNAL MEDICINE

## 2018-05-01 PROCEDURE — C1894 INTRO/SHEATH, NON-LASER: HCPCS

## 2018-05-01 PROCEDURE — 27210982 ZZH KIT RT HC TOTES DISP CR7

## 2018-05-01 PROCEDURE — 25000132 ZZH RX MED GY IP 250 OP 250 PS 637: Performed by: STUDENT IN AN ORGANIZED HEALTH CARE EDUCATION/TRAINING PROGRAM

## 2018-05-01 PROCEDURE — 25000125 ZZHC RX 250: Performed by: STUDENT IN AN ORGANIZED HEALTH CARE EDUCATION/TRAINING PROGRAM

## 2018-05-01 PROCEDURE — 21400006 ZZH R&B CCU INTERMEDIATE UMMC

## 2018-05-01 PROCEDURE — 94640 AIRWAY INHALATION TREATMENT: CPT

## 2018-05-01 PROCEDURE — 25000128 H RX IP 250 OP 636: Performed by: INTERNAL MEDICINE

## 2018-05-01 PROCEDURE — 94640 AIRWAY INHALATION TREATMENT: CPT | Mod: 76

## 2018-05-01 PROCEDURE — 40000275 ZZH STATISTIC RCP TIME EA 10 MIN

## 2018-05-01 PROCEDURE — 25000132 ZZH RX MED GY IP 250 OP 250 PS 637: Mod: GY

## 2018-05-01 PROCEDURE — 84100 ASSAY OF PHOSPHORUS: CPT | Performed by: PHYSICIAN ASSISTANT

## 2018-05-01 PROCEDURE — 83735 ASSAY OF MAGNESIUM: CPT | Performed by: INTERNAL MEDICINE

## 2018-05-01 PROCEDURE — A9270 NON-COVERED ITEM OR SERVICE: HCPCS | Mod: GY | Performed by: INTERNAL MEDICINE

## 2018-05-01 PROCEDURE — A9270 NON-COVERED ITEM OR SERVICE: HCPCS | Mod: GY | Performed by: STUDENT IN AN ORGANIZED HEALTH CARE EDUCATION/TRAINING PROGRAM

## 2018-05-01 PROCEDURE — 90937 HEMODIALYSIS REPEATED EVAL: CPT

## 2018-05-01 PROCEDURE — 00000146 ZZHCL STATISTIC GLUCOSE BY METER IP

## 2018-05-01 PROCEDURE — 27211181 ZZH BALLOON TIP PRESSURE CR5

## 2018-05-01 PROCEDURE — 93451 RIGHT HEART CATH: CPT

## 2018-05-01 PROCEDURE — 4A023N6 MEASUREMENT OF CARDIAC SAMPLING AND PRESSURE, RIGHT HEART, PERCUTANEOUS APPROACH: ICD-10-PCS | Performed by: INTERNAL MEDICINE

## 2018-05-01 PROCEDURE — 84100 ASSAY OF PHOSPHORUS: CPT | Performed by: INTERNAL MEDICINE

## 2018-05-01 PROCEDURE — 99233 SBSQ HOSP IP/OBS HIGH 50: CPT | Mod: 25 | Performed by: NURSE PRACTITIONER

## 2018-05-01 PROCEDURE — 40000802 ZZH SITE CHECK

## 2018-05-01 PROCEDURE — 93451 RIGHT HEART CATH: CPT | Mod: 26 | Performed by: INTERNAL MEDICINE

## 2018-05-01 PROCEDURE — A9270 NON-COVERED ITEM OR SERVICE: HCPCS | Mod: GY

## 2018-05-01 PROCEDURE — 85027 COMPLETE CBC AUTOMATED: CPT | Performed by: INTERNAL MEDICINE

## 2018-05-01 PROCEDURE — 27210787 ZZH MANIFOLD CR2

## 2018-05-01 RX ORDER — HEPARIN SODIUM 1000 [USP'U]/ML
3 INJECTION, SOLUTION INTRAVENOUS; SUBCUTANEOUS ONCE
Status: COMPLETED | OUTPATIENT
Start: 2018-05-01 | End: 2018-05-01

## 2018-05-01 RX ORDER — DOXERCALCIFEROL 4 UG/2ML
0.5 INJECTION INTRAVENOUS
Status: COMPLETED | OUTPATIENT
Start: 2018-05-01 | End: 2018-05-01

## 2018-05-01 RX ADMIN — OMEPRAZOLE 20 MG: 20 CAPSULE, DELAYED RELEASE ORAL at 20:11

## 2018-05-01 RX ADMIN — INSULIN GLARGINE 10 UNITS: 100 INJECTION, SOLUTION SUBCUTANEOUS at 21:41

## 2018-05-01 RX ADMIN — SODIUM CHLORIDE 250 ML: 9 INJECTION, SOLUTION INTRAVENOUS at 09:48

## 2018-05-01 RX ADMIN — CHLORHEXIDINE GLUCONATE 15 ML: 1.2 RINSE ORAL at 20:12

## 2018-05-01 RX ADMIN — ATORVASTATIN CALCIUM 40 MG: 40 TABLET, FILM COATED ORAL at 20:11

## 2018-05-01 RX ADMIN — ASPIRIN 81 MG CHEWABLE TABLET 81 MG: 81 TABLET CHEWABLE at 20:11

## 2018-05-01 RX ADMIN — Medication: at 09:48

## 2018-05-01 RX ADMIN — CALCIUM ACETATE 667 MG: 667 CAPSULE ORAL at 17:19

## 2018-05-01 RX ADMIN — DOXERCALCIFEROL 0.5 MCG: 4 INJECTION, SOLUTION INTRAVENOUS at 10:22

## 2018-05-01 RX ADMIN — SODIUM CHLORIDE 300 ML: 9 INJECTION, SOLUTION INTRAVENOUS at 09:47

## 2018-05-01 RX ADMIN — Medication 1 CAPSULE: at 17:21

## 2018-05-01 RX ADMIN — IRON SUCROSE 50 MG: 20 INJECTION, SOLUTION INTRAVENOUS at 10:25

## 2018-05-01 RX ADMIN — HEPARIN SODIUM 3000 UNITS: 1000 INJECTION, SOLUTION INTRAVENOUS; SUBCUTANEOUS at 13:31

## 2018-05-01 RX ADMIN — CHLORHEXIDINE GLUCONATE 15 ML: 1.2 RINSE ORAL at 08:05

## 2018-05-01 RX ADMIN — TRAMADOL HYDROCHLORIDE 50 MG: 50 TABLET, COATED ORAL at 18:53

## 2018-05-01 RX ADMIN — ALLOPURINOL 100 MG: 100 TABLET ORAL at 08:05

## 2018-05-01 RX ADMIN — SENNOSIDES AND DOCUSATE SODIUM 1 TABLET: 8.6; 5 TABLET ORAL at 20:11

## 2018-05-01 RX ADMIN — ALBUTEROL SULFATE 2.5 MG: 2.5 SOLUTION RESPIRATORY (INHALATION) at 19:28

## 2018-05-01 RX ADMIN — FLUTICASONE PROPIONATE 2 SPRAY: 50 SPRAY, METERED NASAL at 20:12

## 2018-05-01 RX ADMIN — MIDODRINE HYDROCHLORIDE 10 MG: 5 TABLET ORAL at 10:21

## 2018-05-01 RX ADMIN — ALTEPLASE 2 MG: 2.2 INJECTION, POWDER, LYOPHILIZED, FOR SOLUTION INTRAVENOUS at 08:06

## 2018-05-01 NOTE — PROCEDURES
PRELIMINARY CARDIAC CATH REPORT:     PROCEDURES PERFORMED:   Right Heart Catheterization    PHYSICIANS:  Attending Physician: John Minaya MD, PhD  Cardiology Fellow: Zeke Balderrama MD, PhD    INDICATION:  Murray Nicholson is a 63 year old male with Severe AI, Severe MR, ESRD on HD, ICM with EF 15-20% who was admitted for heart failure.  He was started on dobutamine with repeat hemodynamic evaluation to determine his response to dobutamine and dialysis.    DESCRIPTION:  1. Consent obtained with discussion of risks.  All questions were answered.  2. Sterile prep and procedure.  3. Location with Sheaths:   Lf IJ  7 Fr 10 cm [short]  4. Access: Local anesthetic with lidocaine.  A micropuncture 21 gauge needle with ultrasound guidance was used to establish vascular access using a modified Seldinger technique.  5. Diagnostic Catheters: 7 Fr  Tracy Jax  6. Estimated blood loss: < 5 ml    HEMODYNAMICS:  BP: 102/67 (80)  RA: 7/4/3   RV: 34/7  PA: 34/20 (24)   PCWP: 15   PA sat: 53.6%   Kassi CO/CI: 3.8/1.8  TD CO/CI: 3.9/2.1   PVR: 2.3   SVR: 1580    Sheath Removal:  The venous sheath was manually removed in the cardiac catheterization laboratory.    Contrast: Isovue, 0 ml     Fluoroscopy Time: 1.1 min    COMPLICATIONS:  1. None    SUMMARY:   -Normal biventricular filling pressures, high end normal pulmonary arterial pressures, with reduced cardiac output    PLAN:    >>. Return to the primary inpatient team for further evaluation and management.    The attending cardiologist was present and supervised all critical aspects the procedure.    Findings discussed with Dr. Blum.    See CVIS report for final draft.    Zeke Balderrama MD, PhD  Cardiology Fellow    John Minaya MD, PhD  Cardiology Staff

## 2018-05-01 NOTE — PROGRESS NOTES
HEMODIALYSIS TREATMENT NOTE    Date: 5/1/2018  Time: 1:23 PM    Data:  Pre Wt: 79.8 kg (175 lb 14.8 oz)   Desired Wt: 77 kg   Post Wt: 77 kg (169 lb 12.1 oz)  Weight change: -2.8 kg  Ultrafiltration - Post Run Net Total Removed (mL): 2800 mL  Ultrafiltration - Post Run Net Total Gain (mL): 0 mL  Vascular Access Status: Yes, secured and visible  Dialyzer Rinse: Light, Streaked  Total Blood Volume Processed: 78.2  Total Dialysis (Treatment) Time: 3.5 hours    Lab:   CBC & BMP    Interventions/Assessments:  Patient tolerated 3.5 hour hemodialysis treatment well. All blood returned to patient. Removed 2.8L to allow patient to return to his EDW of 77kg. VSS during and post treatment. Post treatment patient is sinus tachycardic on the monitor with HR is the 100-110 range. Afebrile. Alert and oriented x4. No edema. Lungs clear bilaterally. 99% oxygen saturation on room air. Right CVC packed with NSS post treatment. Tegos applied today. CVC dressing changed using sterile technique. No signs or symptoms of infection at the site. Midodrine, hectorol, and iron given during treatment- see MAR. No acute distress. Patient stable for transport. Report given to cath lab RN and PCN on 6C.     Plan:    Next hemodialysis treatment will be decided by renal team.

## 2018-05-01 NOTE — PROGRESS NOTES
Nephrology Progress Note  05/01/2018         Assessment & Recommendations:   Murray Nicholson is a 63 yr old male with PMH of HTN, DMII, functionally bicuspid aortic valve, CHF/CM (EF 10-15%), ESRD, admitted with worsening dyspnea/SOB, now on dobutamine drip and being evaluated for heart/kidney transplant.      ESKD: due to DMII, on PD since Aug 2017, changed to iHD 1/2018 due to polymicrobial and fungal peritonitis, now on TTS schedule at [facility] under care of Dr Mcpherson. Access: tunneled RIJ (replaced 4/17/18). Run time: 4 hrs; EDW 77 kg.  - Dialysis per TTS schedule   - Heparin lock CVC          Volume: 77 kg, had challenged dry weight then had RHC on 4/20 with PCW of 10, RA 1. Given very low RA pressure, we increased EDW to 77 kg  - Daily standing weights please     Severe AV and MR insufficiency:   BP/congestive CM (EF 10-15%); minimal CAD per angio on 2/8/17. Chronically hypotensive with tachycardia  - Goal MAP > 65 and SBP > 95 while dialyzing  - Now on dobutamine 2.5 mcg/kg/min (started 4/20/18)  - per kidney transplant team, if pt is approved for heart transplant, then he can also have kidney transplant.         Anemia: hgb 10.0; Recent labs with hgb in 11's, ferritin 371, IS 12, iron 36; on venofer 50 mg qTuesday, ANASTASIYA recently stopped.  - Continue venofer      BMD: calcium 9.7, alb 3.5, phos 5.4; recent ; on hectorol 0.5 mcg via HD; on phoslo 1 tab TID with meals.   - Continue hectorol via HD  - Continue Phoslo  - Rechecking phos level    Recommendations were communicated to primary team via this note and phone conversation.       Kristi Armas PA-C  Division of Kidney Disease  [phone]    Interval History :   Seen on dialysis, UF to dry weight; will have RHC after dialysis. Now being evaluated for heart/kidney.  Denies n/v, CP, chills.    Review of Systems:   4 point ROS negative other than as noted above.    Physical Exam:   I/O last 3 completed shifts:  In: 669.6 [P.O.:600;  "I.V.:69.6]  Out: 0    BP 96/64  Pulse 108  Temp 98.3  F (36.8  C) (Oral)  Resp 18  Ht 1.727 m (5' 8\")  Wt 79.8 kg (176 lb)  SpO2 99%  BMI 26.76 kg/m2     GENERAL APPEARANCE: alert, NAD  EYES: no scleral icterus, pupils equal   Pulmonary: lungs clear to auscultation with equal breath sounds bilaterally   CV: regular rhythm, normal rate    - Edema trace  GI: soft, nontender, normal bowel sounds  MS: no evidence of inflammation in joints, no muscle tenderness  : no doyle  SKIN: no rash, warm, dry, no cyanosis  NEURO: face symmetric, no asterixis   Access: tunneled RIJ       Labs:   All labs reviewed by me  Electrolytes/Renal -   Recent Labs   Lab Test  04/30/18   0445  04/29/18   0715  04/28/18   0650  04/27/18   0655   04/25/18   1121   04/21/18   0640   04/16/18   1546   02/12/18   0656   NA  135   --    --   135   --   135   < >  135   < >  135   < >  136   POTASSIUM  4.7   --   4.2  4.4   < >  4.6   < >  4.0   < >  5.0   < >  4.5   CHLORIDE  96   --    --   96   --   99   < >  96   < >  101   < >  103   CO2  31   --    --   28   --   28   < >  24   < >  18*   < >  22   BUN  27   --    --   20   --   23   < >  41*   < >  63*   < >  37*   CR  6.30*   --    --   2.77*   --   6.00*   < >  7.72*   < >  8.81*   < >  7.12*   GLC  101*   --    --   214*   --   230*   < >  111*   < >  253*   < >  121*   TRINI  8.9   --    --   8.5   --   8.7   < >  9.2   < >  9.6   < >  8.8   MAG  2.3  1.9  2.4*  2.2   < >  2.4*   < >  1.6   < >  1.9   --   1.9   PHOS   --    --    --    --    --    --    --   5.4*   --   6.4*   --   4.1    < > = values in this interval not displayed.       CBC -   Recent Labs   Lab Test  04/30/18   0445  04/29/18   0715  04/24/18   0530   WBC  5.5  5.1  4.6   HGB  10.1*  10.7*  11.0*   PLT  201  185  194       LFTs -   Recent Labs   Lab Test  04/16/18   1546  03/07/18   0833  02/19/18   1558  02/02/18   1736   ALKPHOS  123  129  102  86   BILITOTAL  0.8  0.7  0.6  0.4   ALT  22  21  15  16   AST  17 "  18  18  20   PROTTOTAL  7.4   --   7.2  6.2*   ALBUMIN  3.5   --   2.7*  2.1*       Iron Panel -   Recent Labs   Lab Test  07/19/17   1306  07/05/17   1204  06/21/17   1058   IRON  46  26*  69   IRONSAT  18  12*  29   ALBERTINA  369  542*  557*         Imaging:  Reviewed    Current Medications:    sodium chloride 0.9%  250 mL Intravenous Once in dialysis     sodium chloride 0.9%  300 mL Hemodialysis Machine Once     albuterol  2.5 mg Nebulization At Bedtime     allopurinol  100 mg Oral Daily     alteplase  2 mg Intravenous Q2H     aspirin  81 mg Oral Daily     atorvastatin  40 mg Oral Daily     calcium acetate  667 mg Oral TID w/meals     chlorhexidine  15 mL Swish & Spit BID     doxercalciferol  0.5 mcg Intravenous Once in dialysis     fluticasone  1-2 spray Both Nostrils Daily     gelatin absorbable  1 each Topical During Hemodialysis (from stock)     insulin aspart  1-10 Units Subcutaneous TID AC     insulin aspart  1-7 Units Subcutaneous At Bedtime     insulin aspart   Subcutaneous Daily with lunch     insulin aspart   Subcutaneous Daily with supper     insulin glargine  10 Units Subcutaneous At Bedtime     iron sucrose  50 mg Intravenous Once in dialysis     NEPHROCAPS  1 capsule Oral Daily     - MEDICATION INSTRUCTIONS -   Does not apply Once     omeprazole  20 mg Oral Daily     polyethylene glycol  17 g Oral Daily     senna-docusate  1 tablet Oral BID     sodium chloride (PF)  10 mL Intracatheter Q7 Days     sodium chloride (PF)  3 mL Intracatheter Q8H     sodium chloride (PF)  3 mL Intracatheter During Hemodialysis (from stock)     sodium chloride (PF)  3 mL Intracatheter During Hemodialysis (from stock)       DOBUTamine 2.5 mcg/kg/min (05/01/18 0805)     Reason ACE/ARB/ARNI order not selected       Reason beta blocker order not selected       Kristi Armas PA-C

## 2018-05-01 NOTE — PLAN OF CARE
Problem: Patient Care Overview  Goal: Plan of Care/Patient Progress Review  VSS on RA. Denies pain. Dobutamine gtt 2.5 mcg/kg/min. NPO this am for RHC. Dialysis prior to RHC (2.8 liters off). Right subclavian dialysis access and left DL PICC (1x tpa this am for occlusion without success). Awaiting return from RHC, will continue POC.

## 2018-05-01 NOTE — PLAN OF CARE
Problem: Patient Care Overview  Goal: Plan of Care/Patient Progress Review    D: Patient admitted for increasing episodes of respiratory distress and expedited workup for heart/kidney transplant. Patient learned today he is on the heart/kidney list.  I: Continue dobutamine@2.5mcg/kg/min (max concentration). ISS and carb counts with meals.  A: ST, room air, BPs stable, up ad edith, denies pain. Oliguric with HD.  P: HD tomorrow (PRN midodrine prior to HD) and then possible RHC with leave in [place].

## 2018-05-01 NOTE — PLAN OF CARE
Problem: Patient Care Overview  Goal: Plan of Care/Patient Progress Review  Outcome: No Change  Pt VSS, RA, ST on tele, no c/o pain, persistent nonproductive cough. Dobutamine gtt infusing via PICC at 2.5 mcg/kg/min. PCU collect for labs. Pt here for heart and kidney txp workup. NPO since MN for RHC with swan today. Pt also to have HD (Tues/Th/Sat schedule). Pt up independently, appeared to rest comfortably between cares overnight. Continue to monitor, follow poc, alert Cards2 MD's with changes and/or concerns.

## 2018-05-01 NOTE — PROGRESS NOTES
[facility]   Cardiology II Service / Advanced Heart Failure  Daily Progress Note  Date of Service: 5/1/2018      Patient: Murray Nciholson  MRN: [id number]  Admission Date: 4/16/2018  Hospital Day # 15    Assessment and Plan: Murray Nicholson is a 63 year old male DM Type II, SHEELA, GERD, HTN, Dyslipidemia, Severe AI, Severe MR, ESRD on HD, ICM with EF 15-20%. He presented per [facility] for decompensated heart failure.       ICM. 3/15/18 Echo notes EF 15-20%, mild-moderately reduced RV function, bicuspid AV with severe holodiastolic AI, and torn chordae to A3 P3 MR. NM Lexiscan 1/12/16 negative for ischemia. LHC 2/8/17 with no obstructive CAD. CPX 3/30/18 consistent with RER 1.23, VEVCO2 slope of 68, with FC of 31%. Secondary to ischemic and valvular etiology. RHC 4/20/18 mRA-1, mPA-27, mPCW-10, BECKA CO-3.17, and BECKA CI-1.68.   Stage D, NYHA Class IIIB  ACEi/ARB Held in setting of symptomatic hypotension.   BB contraindicated due to low cardiac output. Dobutamine 2.5 mcg/kg/min.   Aldosterone antagonist: Contraindicated due to renal dysfunction  SCD prophylaxis Defer pending transplant workup.   Fluid status: Euvolemic. Management per HD.   Sleep Apnea: CPAP.   - RHC today with potential swan leave in pending data.      ESRD on HD. Hyperkalemia resolved. K 6.2 4/17 shifted with insulin given issue with tunneled line. S/p IR tunneled line exchange 4/17/18.  - Appreciate consult per Nephrology.   - HD Tuesday, Thursday, and Saturday.   - Midodrine 10 mg po prior to dialysis.   - Per Nephrology patient would be candidate for renal transplant pending cardiac transplant listing.       Severe AI and MR. Suboptimal candidate for valve surgery in setting of heart failure.   - Appreciate Surgical consult, await committee presentation for transplant status.       Pancreatic cysts. 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 GI curbside appreciated. Pseudocyt vs IPMN.   - Abdominal  "MRI notes multiple cysts largest 2 cm in diameter. Repeat MRI in 6 months for maintenance.       Chronic Medical Conditions:  GERD. Prilosec.   DM Type II. Novolog increased to high intensity SSI. Lantus 10 units daily. BG stable.   AAA. 4.5 x 4.5 cm proximal ascending aortic aneurysm seen on MRI done 2/14. Recent echo 2/2/18 showed size of 4.2 cm.   CAD. BB contraindicated in low output state. ASA 81 mg po daily and Lipitor.   Right maxillary sinusitis. CT Head consistent with right maxillary sinusitis with current complaints of PND. Completed a 7 day course of Doxycyline.       FEN: 2 gram NA diet   PROPHY: Prilosec  LINES: PIV  DISPO:  TBD.   CODE STATUS: Full Code  ================================================================  Interval History/ROS: He is feeling well this AM. He denies fever, chills, chest pain, SOB, MEADE, PND, cough, nausea, vomiting, diarrhea, and abdominal pain. He complains of mild abdominal distention and LE edema. He is tolerating oral intake and ambulation well.     Last 24 hr care team notes reviewed.   ROS:  4 point ROS including Respiratory, CV, GI and , other than that noted in the HPI, is negative.     Medications: Reviewed in EPIC.     Physical Exam:   /74  Pulse 108  Temp 98.2  F (36.8  C) (Oral)  Resp 18  Ht 1.727 m (5' 8\")  Wt 79.8 kg (175 lb 14.8 oz)  SpO2 100%  BMI 26.75 kg/m2  GENERAL: Appears alert and oriented times three.   HEENT: Eye symmetrical and free of discharge bilaterally. Mucous membranes moist and without lesions.  NECK: Supple and without lymphadenopathy. JVD lower 1/3 of neck upright.  CV: RRR, S1S2 present with III/IV murmur heard best at LSB.   RESPIRATORY: Respirations regular, even, and unlabored. Lungs CTA throughout.   GI: Soft and mildly distended with normoactive bowel sounds present in all quadrants. No tenderness, rebound, guarding. No organomegaly.   EXTREMITIES: +1 bilateral LE edema. 2+ bilateral pedal pulses.   NEUROLOGIC: Alert " and orientated x 3. CN II-XII grossly intact. No focal deficits.   MUSCULOSKELETAL: No joint swelling or tenderness.   SKIN: No jaundice. No rashes or lesions.     Data:  CMP  Recent Labs  Lab 05/01/18  0930 04/30/18  0445 04/29/18  0715 04/28/18  0650 04/27/18  0655  04/25/18  1121    135  --   --  135  --  135   POTASSIUM 5.0 4.7  --  4.2 4.4  < > 4.6   CHLORIDE 94 96  --   --  96  --  99   CO2 31 31  --   --  28  --  28   ANIONGAP 8 8  --   --  10  --  8   * 101*  --   --  214*  --  230*   BUN 36* 27  --   --  20  --  23   CR 8.51* 6.30*  --   --  2.77*  --  6.00*   GFRESTIMATED 6* 9*  --   --  23*  --  10*   GFRESTBLACK 8* 11*  --   --  28*  --  12*   TRINI 9.7 8.9  --   --  8.5  --  8.7   MAG  --  2.3 1.9 2.4* 2.2  < > 2.4*   < > = values in this interval not displayed.  CBC  Recent Labs  Lab 05/01/18  0930 04/30/18  0445 04/29/18  0715   WBC 4.6 5.5 5.1   RBC 3.24* 3.23* 3.50*   HGB 10.0* 10.1* 10.7*   HCT 31.5* 31.4* 34.6*   MCV 97 97 99   MCH 30.9 31.3 30.6   MCHC 31.7 32.2 30.9*   RDW 15.3* 15.2* 15.3*    201 185     Patient discussed with Dr. Blum.      Jennifer Dean FNP  5/1/2018

## 2018-05-02 ENCOUNTER — DOCUMENTATION ONLY (OUTPATIENT)
Dept: TRANSPLANT | Facility: CLINIC | Age: 63
End: 2018-05-02

## 2018-05-02 ENCOUNTER — APPOINTMENT (OUTPATIENT)
Dept: OCCUPATIONAL THERAPY | Facility: CLINIC | Age: 63
DRG: 001 | End: 2018-05-02
Attending: INTERNAL MEDICINE
Payer: COMMERCIAL

## 2018-05-02 ENCOUNTER — APPOINTMENT (OUTPATIENT)
Dept: ULTRASOUND IMAGING | Facility: CLINIC | Age: 63
DRG: 001 | End: 2018-05-02
Attending: PHYSICIAN ASSISTANT
Payer: COMMERCIAL

## 2018-05-02 ENCOUNTER — COMMITTEE REVIEW (OUTPATIENT)
Dept: TRANSPLANT | Facility: CLINIC | Age: 63
End: 2018-05-02

## 2018-05-02 ENCOUNTER — ALLIED HEALTH/NURSE VISIT (OUTPATIENT)
Dept: TRANSPLANT | Facility: CLINIC | Age: 63
End: 2018-05-02

## 2018-05-02 DIAGNOSIS — Z76.82 AWAITING ORGAN TRANSPLANT: Primary | ICD-10-CM

## 2018-05-02 LAB
ANION GAP SERPL CALCULATED.3IONS-SCNC: 8 MMOL/L (ref 3–14)
BASE EXCESS BLDV CALC-SCNC: 7.5 MMOL/L
BUN SERPL-MCNC: 18 MG/DL (ref 7–30)
CALCIUM SERPL-MCNC: 9.4 MG/DL (ref 8.5–10.1)
CHLORIDE SERPL-SCNC: 94 MMOL/L (ref 94–109)
CO2 SERPL-SCNC: 31 MMOL/L (ref 20–32)
CREAT SERPL-MCNC: 5.91 MG/DL (ref 0.66–1.25)
ERYTHROCYTE [DISTWIDTH] IN BLOOD BY AUTOMATED COUNT: 15.3 % (ref 10–15)
GFR SERPL CREATININE-BSD FRML MDRD: 10 ML/MIN/1.7M2
GLUCOSE BLDC GLUCOMTR-MCNC: 137 MG/DL (ref 70–99)
GLUCOSE BLDC GLUCOMTR-MCNC: 148 MG/DL (ref 70–99)
GLUCOSE BLDC GLUCOMTR-MCNC: 157 MG/DL (ref 70–99)
GLUCOSE BLDC GLUCOMTR-MCNC: 173 MG/DL (ref 70–99)
GLUCOSE BLDC GLUCOMTR-MCNC: 260 MG/DL (ref 70–99)
GLUCOSE BLDC GLUCOMTR-MCNC: 92 MG/DL (ref 70–99)
GLUCOSE SERPL-MCNC: 66 MG/DL (ref 70–99)
HCO3 BLDV-SCNC: 33 MMOL/L (ref 21–28)
HCT VFR BLD AUTO: 32.9 % (ref 40–53)
HGB BLD-MCNC: 10.3 G/DL (ref 13.3–17.7)
MAGNESIUM SERPL-MCNC: 1.9 MG/DL (ref 1.6–2.3)
MCH RBC QN AUTO: 30.4 PG (ref 26.5–33)
MCHC RBC AUTO-ENTMCNC: 31.3 G/DL (ref 31.5–36.5)
MCV RBC AUTO: 97 FL (ref 78–100)
O2/TOTAL GAS SETTING VFR VENT: 21 %
OXYHGB MFR BLDV: 47 %
PCO2 BLDV: 51 MM HG (ref 40–50)
PH BLDV: 7.42 PH (ref 7.32–7.43)
PLATELET # BLD AUTO: 222 10E9/L (ref 150–450)
PO2 BLDV: 29 MM HG (ref 25–47)
POTASSIUM SERPL-SCNC: 4.6 MMOL/L (ref 3.4–5.3)
RBC # BLD AUTO: 3.39 10E12/L (ref 4.4–5.9)
SODIUM SERPL-SCNC: 134 MMOL/L (ref 133–144)
WBC # BLD AUTO: 3.5 10E9/L (ref 4–11)

## 2018-05-02 PROCEDURE — 82805 BLOOD GASES W/O2 SATURATION: CPT | Performed by: NURSE PRACTITIONER

## 2018-05-02 PROCEDURE — 83735 ASSAY OF MAGNESIUM: CPT | Performed by: INTERNAL MEDICINE

## 2018-05-02 PROCEDURE — 25000132 ZZH RX MED GY IP 250 OP 250 PS 637: Mod: GY | Performed by: NURSE PRACTITIONER

## 2018-05-02 PROCEDURE — 93925 LOWER EXTREMITY STUDY: CPT

## 2018-05-02 PROCEDURE — 40000802 ZZH SITE CHECK

## 2018-05-02 PROCEDURE — A9270 NON-COVERED ITEM OR SERVICE: HCPCS | Mod: GY

## 2018-05-02 PROCEDURE — A9270 NON-COVERED ITEM OR SERVICE: HCPCS | Mod: GY | Performed by: INTERNAL MEDICINE

## 2018-05-02 PROCEDURE — 25000132 ZZH RX MED GY IP 250 OP 250 PS 637: Mod: GY | Performed by: INTERNAL MEDICINE

## 2018-05-02 PROCEDURE — A9270 NON-COVERED ITEM OR SERVICE: HCPCS | Mod: GY | Performed by: STUDENT IN AN ORGANIZED HEALTH CARE EDUCATION/TRAINING PROGRAM

## 2018-05-02 PROCEDURE — 25000128 H RX IP 250 OP 636: Performed by: NURSE PRACTITIONER

## 2018-05-02 PROCEDURE — 40000275 ZZH STATISTIC RCP TIME EA 10 MIN

## 2018-05-02 PROCEDURE — 21400006 ZZH R&B CCU INTERMEDIATE UMMC

## 2018-05-02 PROCEDURE — 25000125 ZZHC RX 250: Performed by: STUDENT IN AN ORGANIZED HEALTH CARE EDUCATION/TRAINING PROGRAM

## 2018-05-02 PROCEDURE — 25000132 ZZH RX MED GY IP 250 OP 250 PS 637: Mod: GY | Performed by: STUDENT IN AN ORGANIZED HEALTH CARE EDUCATION/TRAINING PROGRAM

## 2018-05-02 PROCEDURE — 00000146 ZZHCL STATISTIC GLUCOSE BY METER IP

## 2018-05-02 PROCEDURE — 80048 BASIC METABOLIC PNL TOTAL CA: CPT | Performed by: INTERNAL MEDICINE

## 2018-05-02 PROCEDURE — 97110 THERAPEUTIC EXERCISES: CPT | Mod: GO | Performed by: OCCUPATIONAL THERAPIST

## 2018-05-02 PROCEDURE — 25000132 ZZH RX MED GY IP 250 OP 250 PS 637: Mod: GY

## 2018-05-02 PROCEDURE — 40000133 ZZH STATISTIC OT WARD VISIT: Performed by: OCCUPATIONAL THERAPIST

## 2018-05-02 PROCEDURE — 94640 AIRWAY INHALATION TREATMENT: CPT

## 2018-05-02 PROCEDURE — 85027 COMPLETE CBC AUTOMATED: CPT | Performed by: INTERNAL MEDICINE

## 2018-05-02 PROCEDURE — 99233 SBSQ HOSP IP/OBS HIGH 50: CPT | Performed by: INTERNAL MEDICINE

## 2018-05-02 PROCEDURE — A9270 NON-COVERED ITEM OR SERVICE: HCPCS | Mod: GY | Performed by: NURSE PRACTITIONER

## 2018-05-02 PROCEDURE — 25000125 ZZHC RX 250: Performed by: NURSE PRACTITIONER

## 2018-05-02 PROCEDURE — 93970 EXTREMITY STUDY: CPT

## 2018-05-02 RX ORDER — ONDANSETRON 2 MG/ML
4 INJECTION INTRAMUSCULAR; INTRAVENOUS EVERY 6 HOURS PRN
Status: DISCONTINUED | OUTPATIENT
Start: 2018-05-02 | End: 2018-05-11

## 2018-05-02 RX ADMIN — SENNOSIDES AND DOCUSATE SODIUM 1 TABLET: 8.6; 5 TABLET ORAL at 07:56

## 2018-05-02 RX ADMIN — CHLORHEXIDINE GLUCONATE 15 ML: 1.2 RINSE ORAL at 20:13

## 2018-05-02 RX ADMIN — ALBUTEROL SULFATE 2.5 MG: 2.5 SOLUTION RESPIRATORY (INHALATION) at 22:10

## 2018-05-02 RX ADMIN — TRAMADOL HYDROCHLORIDE 50 MG: 50 TABLET, COATED ORAL at 01:37

## 2018-05-02 RX ADMIN — OMEPRAZOLE 20 MG: 20 CAPSULE, DELAYED RELEASE ORAL at 20:13

## 2018-05-02 RX ADMIN — POLYETHYLENE GLYCOL 3350, SODIUM SULFATE ANHYDROUS, SODIUM BICARBONATE, SODIUM CHLORIDE, POTASSIUM CHLORIDE 4000 ML: 236; 22.74; 6.74; 5.86; 2.97 POWDER, FOR SOLUTION ORAL at 16:50

## 2018-05-02 RX ADMIN — ONDANSETRON 4 MG: 2 INJECTION INTRAMUSCULAR; INTRAVENOUS at 20:13

## 2018-05-02 RX ADMIN — FLUTICASONE PROPIONATE 2 SPRAY: 50 SPRAY, METERED NASAL at 20:20

## 2018-05-02 RX ADMIN — ONDANSETRON 4 MG: 2 INJECTION INTRAMUSCULAR; INTRAVENOUS at 11:03

## 2018-05-02 RX ADMIN — ASPIRIN 81 MG CHEWABLE TABLET 81 MG: 81 TABLET CHEWABLE at 20:20

## 2018-05-02 RX ADMIN — ALLOPURINOL 100 MG: 100 TABLET ORAL at 07:57

## 2018-05-02 RX ADMIN — PROCHLORPERAZINE EDISYLATE 5 MG: 5 INJECTION INTRAMUSCULAR; INTRAVENOUS at 16:42

## 2018-05-02 RX ADMIN — Medication 2 G: at 07:53

## 2018-05-02 RX ADMIN — Medication 1 CAPSULE: at 07:56

## 2018-05-02 RX ADMIN — POLYETHYLENE GLYCOL 3350 17 G: 17 POWDER, FOR SOLUTION ORAL at 07:57

## 2018-05-02 RX ADMIN — CHLORHEXIDINE GLUCONATE 15 ML: 1.2 RINSE ORAL at 07:58

## 2018-05-02 RX ADMIN — CALCIUM ACETATE 667 MG: 667 CAPSULE ORAL at 07:56

## 2018-05-02 RX ADMIN — ATORVASTATIN CALCIUM 40 MG: 40 TABLET, FILM COATED ORAL at 20:13

## 2018-05-02 ASSESSMENT — PAIN DESCRIPTION - DESCRIPTORS
DESCRIPTORS: DISCOMFORT
DESCRIPTORS: DISCOMFORT;ACHING

## 2018-05-02 NOTE — PLAN OF CARE
Problem: Patient Care Overview  Goal: Plan of Care/Patient Progress Review  Outcome: No Change    D/I: Patient's vital signs have been stable. Rhythm was -110 with 0-3 PVC's/min, an isolated PVC triplet, rare PAC, and a 5 beat run VT at 08:50 and a 6 beat run VT at 12:03. Cards 2 PA was notified. He denies pain. He had a 200 cc emesis at 10 and he received 4 mg iv Zofran with some relief. He is now on a clear diet and will be NPO after midnight for a colonoscopy tomorrow. He was told he needs to drink golytely until he is clear. He refused to mix it at 14. His Lantus insulin and carb coverage was discontinued and refused to have his glucose 147 covered with 1 unit of insulin. He had a bilateral lower extremity ultrasound this afternoon.                .  A: See flow sheets for further assessment details  P: Continue to monitor vital signs, rhythm, and labs. Notify MD with any changes or concerns

## 2018-05-02 NOTE — COMMITTEE REVIEW
Abdominal Committee Review Note     Evaluation Date: 5/2/2018  Committee Review Date: 5/2/2018    Organ being evaluated for: Kidney    Transplant Phase: Evaluation  Transplant Status: Active    Transplant Coordinator: Moraima Blackwell  Transplant Surgeon:       Referring Physician:     Primary Diagnosis: Diabetes Mellitus - Type II  Secondary Diagnosis:     Committee Review Members:  Nephrology Des Frazier, APRN CNP, Gilberto Ulloa MD   Nutrition Scarlet Bowden RD    - Clinical Sil Perez, [facility] [place], Laura Roman, [facility] [place]   Transplant Lynne Sahni PA-C, Clarisa Rai, TONY, Joann Orozco, TONY, Kari Lara LPN, Maliha Jesus, LEWIS, Jodee St, TONY, Edith Curiel, TONY, Calin Cheney MD, Giovany Quijano MD, Moraima Blackwell, RN       Transplant Eligibility: Irreversible chronic kidney disease treated w/dialysis or expected need for dialysis    Committee Review Decision: Approved    Relative Contraindications: None    Absolute Contraindications: Active/ongoing non-compliance and/or failure to provide medical information     Committee Chair Calin Cheney MD verbally attested to the committee's decision.    Committee Discussion Details: Reviewed medical records and current hospitalization  to date. He was de-listed from kidney wait list due to non-compliance 12-6-2017.Team would like social work to specifically address the issue of compliance. He will need an updated nephrology and transplant surgeon note.He will need a colonoscopy and bilateral iliac duplex. Heart transplant team is waiting for kidney team approval before listing. Patient is approved as a kidney transplant candidate.

## 2018-05-02 NOTE — PLAN OF CARE
Problem: Patient Care Overview  Goal: Plan of Care/Patient Progress Review  Outcome: No Change  D: Heart failure. ESRD. Awaiting transplant decision.  I/A: VSS. Pain managed with PRN Tramadol. Sinus tach. Dobutamine continues at 2.5 mcg/kg/min. Anuric/on dialysis.  P: Continue to monitor.

## 2018-05-02 NOTE — PLAN OF CARE
Problem: Patient Care Overview  Goal: Plan of Care/Patient Progress Review  OT/CR 6C:  Discharge Planner OT   Patient plan for discharge: Pt reports he anticipates transplant prior to discharge.    Current status: Pt SBA for mobility within room with IV pole. Pt ambulated 250 ft x 2 with IV pole and SBA. Pt tolerated 10 minutes on nustep with normal cardiac response. Pt completed x10 reps total sit to stand from low surface chair for proximal muscle strength in anticipation of sternal precautions. Post exercise vitals: RA, sats 100%, BP 96/61, Map 70, .   Barriers to return to prior living situation: medical status  Recommendations for discharge: Per plan established by the Occupational Therapist, the recommendation for discharge location is Home with OP CR Phase II.   Rationale for recommendations: Pt is independent with basic mobility and self cares though would benefit from continued therapy to increase activity tolerance and aerobic conditioning.       Entered by: Harika Murray 05/02/2018 11:10 AM
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
## 2018-05-02 NOTE — PROGRESS NOTES
"Kidney Transplant Evaluation - 5/2/2018  Murray Nicholson attended the pre-transplant patient education class today. The [facility] website pre-transplant modules were viewed; class participants were educated on using the site.  We reviewed the [facility] information verbally. Patient is inpatient on [facility]. He was instructed how to access the web site.   Content reviewed:    Living Donation and how to access that program    Paired exchange    Kidney Donor Profile Index (KDPI)    Waiting list issues (right to decline without penalty, high PHS risk donors, what to expect when called with an offer)    Hospital experience,  length of stay , need to stay locally post-discharge (2-4 weeks)    Surgical options (with pictures)                             Post-surgery lifting and driving restrictions    Post-transplant routines, frequency of lab work and clinic visits    Need to stay locally post-discharge (2-4 weeks)    Role of Transplant Coordinator    Participants were informed of the benefits of transplant as well as potential risks such as infection, cancer, and death.  The need for total adherence with immunosuppression medications and following transplant regimens was stressed.  The overall evaluation/approval/listing process was reviewed.        The patient was provided with the following documents:  What You Need to Know About a Kidney Transplant  Adult Kidney Transplant - A Guide for Patients  SRTR Data Sheet - Kidney  Brochure - Kidney Allocation  Brochure - Multiple Listing and Waiting Time Transfer  What Every Patient Needs to Know (UNOS)  UNOS Facts and Figures  Finding a Donor  [facility] - Quick Start Guide  KDPI Consent  Receipt of Information form    Murray Nicholson signed the  Receipt of Information for Organ Transplant Recipient.\" He was provided Moraima Blackwell's business card and instructed to call with additional questions.      Summary    Team s concerns/comments: Case was discussed at " Selection Committee today 5/2/2018. Patient was approved for active kidney listing. Awaiting PA from heart team    Candidacy category: No data was found    Action/Plan: colonoscopy tomorrow and social work to discuss non compliance    Expected Selection Meeting Discussion: done

## 2018-05-02 NOTE — CONSULTS
Transplant Kidney Surgery Evaluation - Inpatient Consult Note  05/02/2018    Assessment & Recommendations: Murray Nicholson is a 63 yr old gentleman male with a past medical history of hypertension (over 10 years), DM type 2 (over ten years), functionally bicuspid aortic valve, CHF/CM (EF 10-15%), ESRD on PD since August 2017, changed to iHD 1/2018 due to polymicrobial and fungal peritonitis (Candida glabrata). Surgical history of bilateral inguinal hernia repair (7/2015) and laparoscopic peritoneal dialysis catheter (6/2017). Admitted with worsening dyspnea/SOB, now on dobutamine drip and being evaluated for heart/kidney transplant.      Recommend b/l doppler US arterial/venous to evaluate iliac vessels  PRA, last done on 4/16/2018.  Patient seen and examined by staff. Recommend to list for kidney transplant if listed for heart transplant.      Medical Decision Making:   Consult [id number] straight forward, 20 minutes    EARL/Fellow/Resident Provider: Anna Serna PA-C    Faculty: Calin Cheney M.D.    __________________________________________________________________  Consult requested by Jennifer Dean NP for kidney transplant evaluation  Admitted 4/16/2018 for Dyspnea  Transplant History: N/A    ROS:   A 10-point review of systems was negative except as noted above.    Curent Meds:    albuterol  2.5 mg Nebulization At Bedtime     allopurinol  100 mg Oral Daily     aspirin  81 mg Oral Daily     atorvastatin  40 mg Oral Daily     calcium acetate  667 mg Oral TID w/meals     chlorhexidine  15 mL Swish & Spit BID     fluticasone  1-2 spray Both Nostrils Daily     insulin aspart  1-10 Units Subcutaneous TID AC     insulin aspart  1-7 Units Subcutaneous At Bedtime     insulin aspart   Subcutaneous Daily with lunch     insulin aspart   Subcutaneous Daily with supper     insulin glargine  5 Units Subcutaneous At Bedtime     NEPHROCAPS  1 capsule Oral Daily     omeprazole  20 mg Oral Daily     polyethylene glycol  17 g  "Oral Daily     senna-docusate  1 tablet Oral BID     sodium chloride (PF)  10 mL Intracatheter Q7 Days     sodium chloride (PF)  3 mL Intracatheter Q8H       Physical Exam:     Admit Weight: 75.4 kg (166 lb 4.8 oz)    Current vitals:   /68 (BP Location: Right arm)  Pulse 108  Temp 97.5  F (36.4  C) (Oral)  Resp 18  Ht 1.727 m (5' 8\")  Wt 76.5 kg (168 lb 11.2 oz)  SpO2 100%  BMI 25.65 kg/m2    Vital sign ranges:    Temp:  [97.5  F (36.4  C)-98.3  F (36.8  C)] 97.5  F (36.4  C)  Heart Rate:  [] 95  Resp:  [18-20] 18  BP: ()/(62-71) 102/68  Cuff Mean (mmHg):  [68-82] 82  SpO2:  [97 %-100 %] 100 %  Patient Vitals for the past 24 hrs:   BP Temp Temp src Heart Rate Resp SpO2 Weight   05/02/18 1127 102/68 97.5  F (36.4  C) Oral 95 18 100 % -   05/02/18 0730 102/68 97.8  F (36.6  C) Oral 110 18 100 % -   05/02/18 0600 - - - - - - 76.5 kg (168 lb 11.2 oz)   05/02/18 0000 103/70 98.2  F (36.8  C) Oral 112 18 99 % -   05/01/18 2140 92/68 - - 112 - - -   05/01/18 1923 (!) 88/62 98.3  F (36.8  C) Oral 107 18 100 % -   05/01/18 1527 106/66 98.3  F (36.8  C) Oral - 18 100 % -   05/01/18 1315 102/71 98.3  F (36.8  C) Oral 105 20 99 % -   05/01/18 1305 - - - - - 99 % -   05/01/18 1300 103/67 - - 101 18 100 % -   05/01/18 1245 108/68 - - 105 18 100 % -   05/01/18 1230 106/70 - - 101 - 100 % -   05/01/18 1215 106/71 - - 105 - 97 % -     General Appearance: in no apparent distress.   Skin: normal, dry, no suspicious lesions or rashes  Cards: RRR, 2+ femoral pulses  Lungs: NLB on RA  Abdomen/pelvis: soft, non distended.  Extremities: BLE edema  Neuro: Alert and oriented. No tremor    Data:   CMP  Recent Labs  Lab 05/02/18  0605 05/01/18  0930    133   POTASSIUM 4.6 5.0   CHLORIDE 94 94   CO2 31 31   GLC 66* 100*   BUN 18 36*   CR 5.91* 8.51*   GFRESTIMATED 10* 6*   GFRESTBLACK 12* 8*   TRINI 9.4 9.7   MAG 1.9 2.4*   PHOS  --  3.2     CBC  Recent Labs  Lab 05/02/18  0605 05/01/18  0930  04/26/18  0645   HGB " 10.3* 10.0*  < >  --    WBC 3.5* 4.6  < >  --     185  < >  --    A1C  --   --   --  7.0*   < > = values in this interval not displayed.  CoagsNo lab results found in last 7 days.    Invalid input(s): XA   Urinalysis  Recent Labs   Lab Test  02/05/18   0935  02/04/18   2127  05/17/17   1225   04/19/17   1442   COLOR  Yellow  Canceled: Specimen improperly labeled. Laboratory value report is not on this patient.   --    < >   --    APPEARANCE  Slightly Cloudy  Canceled: Specimen improperly labeled. Laboratory value report is not on this patient.   --    < >   --    URINEGLC  Negative  Canceled: Specimen improperly labeled. Laboratory value report is not on this patient.*   --    < >   --    URINEBILI  Small*  Canceled: Specimen improperly labeled. Laboratory value report is not on this patient.*   --    < >   --    URINEKETONE  Negative  Canceled: Specimen improperly labeled. Laboratory value report is not on this patient.*   --    < >   --    SG  1.016  Canceled: Specimen improperly labeled. Laboratory value report is not on this patient.   --    < >   --    UBLD  Small*  Canceled: Specimen improperly labeled. Laboratory value report is not on this patient.*   --    < >   --    URINEPH  5.5  Canceled: Specimen improperly labeled. Laboratory value report is not on this patient.   --    < >   --    PROTEIN  100*  Canceled: Specimen improperly labeled. Laboratory value report is not on this patient.*   --    < >   --    NITRITE  Negative  Canceled: Specimen improperly labeled. Laboratory value report is not on this patient.*   --    < >   --    LEUKEST  Negative  Canceled: Specimen improperly labeled. Laboratory value report is not on this patient.*   --    < >   --    RBCU  1  Canceled: Specimen improperly labeled. Laboratory value report is not on this patient.   --    < >   --    WBCU  4*  Canceled: Specimen improperly labeled. Laboratory value report is not on this patient.*   --    < >   --    UTPG   --     --   0.67*   --   0.93*    < > = values in this interval not displayed.       Microbiology:  1/15/2018 Peritoneal fluid culture: candida glabrata    Virology:  Hepatitis C Antibody   Date Value Ref Range Status   04/16/2018 Nonreactive NR^Nonreactive Final     Comment:     Assay performance characteristics have not been established for newborns,   infants, and children           I have reviewed history, examined patient and discussed plan with the fellow/resident/EARL.  I concur with the findings in this note.

## 2018-05-02 NOTE — PROGRESS NOTES
Formerly [facility]   Cardiology II Service / Advanced Heart Failure  Daily Progress Note  Date of Service: 5/2/2018      Patient: Murray Nicholson  MRN: [id number]  Admission Date: 4/16/2018  Hospital Day # 16    Assessment and Plan: Murray Nicholson is a 63 year old male DM Type II, SHEELA, GERD, HTN, Dyslipidemia, Severe AI, Severe MR, ESRD on HD, ICM with EF 15-20%. He presented per [facility] for decompensated heart failure.       ICM. 3/15/18 Echo notes EF 15-20%, mild-moderately reduced RV function, bicuspid AV with severe holodiastolic AI, and torn chordae to A3 P3 MR. NM Lexiscan 1/12/16 negative for ischemia. LHC 2/8/17 with no obstructive CAD. CPX 3/30/18 consistent with RER 1.23, VEVCO2 slope of 68, with FC of 31%. Secondary to ischemic and valvular etiology. RHC 4/20/18 mRA-1, mPA-27, mPCW-10, BECKA CO-3.17, and BECKA CI-1.68.   Stage D, NYHA Class IIIB  ACEi/ARB Held in setting of symptomatic hypotension.   BB contraindicated due to low cardiac output. Dobutamine 2.5 mcg/kg/min.   Aldosterone antagonist: Contraindicated due to renal dysfunction  SCD prophylaxis Defer pending transplant workup.   Fluid status: Euvolemic. Management per HD.   Sleep Apnea: CPAP.   - RHC 5/1/18 with mRA-3, RV-34/7, mPA-24, BECKA CO-3.8, CI-2.  - Plan to obtain colonoscopy today given improvement in cardiac function in inotropes. Appreciate GI consult.   - VBG with OxyHgb 47 today. Nausea resolved with Zofran. Note BG 66 this AM.       ESRD on HD. Hyperkalemia resolved. K 6.2 4/17 shifted with insulin given issue with tunneled line. S/p IR tunneled line exchange 4/17/18.  - Appreciate consult per Nephrology.   - HD Tuesday, Thursday, and Saturday.   - Midodrine 10 mg po prior to dialysis.   - Per Nephrology patient would be candidate for renal transplant pending cardiac transplant listing. Appreciate renal transplant surgery consult.        Severe AI and MR. Suboptimal candidate for valve surgery in setting of heart failure.   -  "Appreciate Surgical consult, await committee presentation for transplant status.       Pancreatic cysts. 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 GI curbside appreciated. Pseudocyt vs IPMN.   - Abdominal MRI notes multiple cysts largest 2 cm in diameter. Repeat MRI in 6 months for maintenance.       Chronic Medical Conditions:  GERD. Prilosec.   DM Type II. Low BG this AM. D/C Lantus and Carb coverage given change to clears for colonoscopy.   AAA. 4.5 x 4.5 cm proximal ascending aortic aneurysm seen on MRI done 2/14. Recent echo 2/2/18 showed size of 4.2 cm.   CAD. BB contraindicated in low output state. ASA 81 mg po daily and Lipitor.   Right maxillary sinusitis. CT Head consistent with right maxillary sinusitis with current complaints of PND. Completed a 7 day course of Doxycyline.       FEN: 2 gram NA diet   PROPHY: Prilosec  LINES: PIV  DISPO:  TBD.   CODE STATUS: Full Code  ================================================================  Interval History/ROS: He complains of nausea with small emesis this AM. He denies fever, chills, chest pain, palpitations, SOB, MEADE, PND, cough, diarrhea, and LE edema. He is tolerating ambulation well.     Last 24 hr care team notes reviewed.   ROS:  4 point ROS including Respiratory, CV, GI and , other than that noted in the HPI, is negative.     Medications: Reviewed in EPIC.     Physical Exam:   /68 (BP Location: Right arm)  Pulse 108  Temp 97.5  F (36.4  C) (Oral)  Resp 18  Ht 1.727 m (5' 8\")  Wt 76.5 kg (168 lb 11.2 oz)  SpO2 100%  BMI 25.65 kg/m2  GENERAL: Appears alert and oriented times three.   HEENT: Eye symmetrical and free of discharge bilaterally. Mucous membranes moist and without lesions.  NECK: Supple and without lymphadenopathy. JVD lower 1/3 of neck upright.   CV: Tachycardic, regular. S1S2 present with III/VI murmur heard best at LSB.   RESPIRATORY: Respirations regular, even, and unlabored. Lungs CTA " throughout.   GI: Soft and mildly distended with normoactive bowel sounds present in all quadrants. No tenderness, rebound, guarding. No organomegaly.   EXTREMITIES: Trace bilateral LE edema. 2+ bilateral pedal pulses.   NEUROLOGIC: Alert and orientated x 3. CN II-XII grossly intact. No focal deficits.   MUSCULOSKELETAL: No joint swelling or tenderness.   SKIN: No jaundice. No rashes or lesions.     Data:  CMP  Recent Labs  Lab 05/02/18  0605 05/01/18  0930 04/30/18  0445 04/29/18  0715 04/28/18  0650 04/27/18  0655    133 135  --   --  135   POTASSIUM 4.6 5.0 4.7  --  4.2 4.4   CHLORIDE 94 94 96  --   --  96   CO2 31 31 31  --   --  28   ANIONGAP 8 8 8  --   --  10   GLC 66* 100* 101*  --   --  214*   BUN 18 36* 27  --   --  20   CR 5.91* 8.51* 6.30*  --   --  2.77*   GFRESTIMATED 10* 6* 9*  --   --  23*   GFRESTBLACK 12* 8* 11*  --   --  28*   TRINI 9.4 9.7 8.9  --   --  8.5   MAG 1.9 2.4* 2.3 1.9 2.4* 2.2   PHOS  --  3.2  --   --   --   --      CBC  Recent Labs  Lab 05/02/18  0605 05/01/18  0930 04/30/18  0445 04/29/18  0715   WBC 3.5* 4.6 5.5 5.1   RBC 3.39* 3.24* 3.23* 3.50*   HGB 10.3* 10.0* 10.1* 10.7*   HCT 32.9* 31.5* 31.4* 34.6*   MCV 97 97 97 99   MCH 30.4 30.9 31.3 30.6   MCHC 31.3* 31.7 32.2 30.9*   RDW 15.3* 15.3* 15.2* 15.3*    185 201 185     Patient discussed with Dr. Blum.     Jennifer Dean [facility]  5/2/2018

## 2018-05-02 NOTE — PLAN OF CARE
Problem: Patient Care Overview  Goal: Plan of Care/Patient Progress Review  Outcome: No Change  Pt with HF continues on a dobutamine drip at 2.5 mcgs/kg/min. VSS. Had a RHC after dialysis today. ST 100s. Eating well, up ad edith. Blood sugars covered with SS insulin.

## 2018-05-03 LAB
ANION GAP SERPL CALCULATED.3IONS-SCNC: 9 MMOL/L (ref 3–14)
BUN SERPL-MCNC: 21 MG/DL (ref 7–30)
CALCIUM SERPL-MCNC: 9.6 MG/DL (ref 8.5–10.1)
CHLORIDE SERPL-SCNC: 96 MMOL/L (ref 94–109)
CO2 SERPL-SCNC: 32 MMOL/L (ref 20–32)
COLONOSCOPY: NORMAL
CREAT SERPL-MCNC: 7.36 MG/DL (ref 0.66–1.25)
ERYTHROCYTE [DISTWIDTH] IN BLOOD BY AUTOMATED COUNT: 15.2 % (ref 10–15)
GFR SERPL CREATININE-BSD FRML MDRD: 8 ML/MIN/1.7M2
GLUCOSE BLDC GLUCOMTR-MCNC: 102 MG/DL (ref 70–99)
GLUCOSE BLDC GLUCOMTR-MCNC: 131 MG/DL (ref 70–99)
GLUCOSE BLDC GLUCOMTR-MCNC: 178 MG/DL (ref 70–99)
GLUCOSE BLDC GLUCOMTR-MCNC: 199 MG/DL (ref 70–99)
GLUCOSE BLDC GLUCOMTR-MCNC: 91 MG/DL (ref 70–99)
GLUCOSE SERPL-MCNC: 109 MG/DL (ref 70–99)
HCT VFR BLD AUTO: 33.4 % (ref 40–53)
HGB BLD-MCNC: 10.4 G/DL (ref 13.3–17.7)
MAGNESIUM SERPL-MCNC: 2.4 MG/DL (ref 1.6–2.3)
MCH RBC QN AUTO: 30.6 PG (ref 26.5–33)
MCHC RBC AUTO-ENTMCNC: 31.1 G/DL (ref 31.5–36.5)
MCV RBC AUTO: 98 FL (ref 78–100)
PLATELET # BLD AUTO: 226 10E9/L (ref 150–450)
POTASSIUM SERPL-SCNC: 4.5 MMOL/L (ref 3.4–5.3)
RBC # BLD AUTO: 3.4 10E12/L (ref 4.4–5.9)
SODIUM SERPL-SCNC: 138 MMOL/L (ref 133–144)
WBC # BLD AUTO: 5.5 10E9/L (ref 4–11)

## 2018-05-03 PROCEDURE — A9270 NON-COVERED ITEM OR SERVICE: HCPCS | Mod: GY

## 2018-05-03 PROCEDURE — 94640 AIRWAY INHALATION TREATMENT: CPT | Mod: 76

## 2018-05-03 PROCEDURE — 25000132 ZZH RX MED GY IP 250 OP 250 PS 637: Mod: GY | Performed by: STUDENT IN AN ORGANIZED HEALTH CARE EDUCATION/TRAINING PROGRAM

## 2018-05-03 PROCEDURE — 25000132 ZZH RX MED GY IP 250 OP 250 PS 637: Performed by: NURSE PRACTITIONER

## 2018-05-03 PROCEDURE — 25000125 ZZHC RX 250: Performed by: STUDENT IN AN ORGANIZED HEALTH CARE EDUCATION/TRAINING PROGRAM

## 2018-05-03 PROCEDURE — 90937 HEMODIALYSIS REPEATED EVAL: CPT

## 2018-05-03 PROCEDURE — 25000128 H RX IP 250 OP 636: Performed by: NURSE PRACTITIONER

## 2018-05-03 PROCEDURE — 80048 BASIC METABOLIC PNL TOTAL CA: CPT | Performed by: INTERNAL MEDICINE

## 2018-05-03 PROCEDURE — A9270 NON-COVERED ITEM OR SERVICE: HCPCS | Mod: GY | Performed by: STUDENT IN AN ORGANIZED HEALTH CARE EDUCATION/TRAINING PROGRAM

## 2018-05-03 PROCEDURE — 99232 SBSQ HOSP IP/OBS MODERATE 35: CPT | Performed by: NURSE PRACTITIONER

## 2018-05-03 PROCEDURE — 83735 ASSAY OF MAGNESIUM: CPT | Performed by: INTERNAL MEDICINE

## 2018-05-03 PROCEDURE — 85027 COMPLETE CBC AUTOMATED: CPT | Performed by: INTERNAL MEDICINE

## 2018-05-03 PROCEDURE — 25000128 H RX IP 250 OP 636: Performed by: INTERNAL MEDICINE

## 2018-05-03 PROCEDURE — G0500 MOD SEDAT ENDO SERVICE >5YRS: HCPCS | Performed by: INTERNAL MEDICINE

## 2018-05-03 PROCEDURE — 21400006 ZZH R&B CCU INTERMEDIATE UMMC

## 2018-05-03 PROCEDURE — A9270 NON-COVERED ITEM OR SERVICE: HCPCS | Mod: GY | Performed by: NURSE PRACTITIONER

## 2018-05-03 PROCEDURE — 25000132 ZZH RX MED GY IP 250 OP 250 PS 637: Mod: GY

## 2018-05-03 PROCEDURE — 40000275 ZZH STATISTIC RCP TIME EA 10 MIN

## 2018-05-03 PROCEDURE — 00000146 ZZHCL STATISTIC GLUCOSE BY METER IP

## 2018-05-03 PROCEDURE — 45378 DIAGNOSTIC COLONOSCOPY: CPT | Performed by: INTERNAL MEDICINE

## 2018-05-03 PROCEDURE — 0DJD8ZZ INSPECTION OF LOWER INTESTINAL TRACT, VIA NATURAL OR ARTIFICIAL OPENING ENDOSCOPIC: ICD-10-PCS | Performed by: INTERNAL MEDICINE

## 2018-05-03 RX ORDER — FENTANYL CITRATE 50 UG/ML
INJECTION, SOLUTION INTRAMUSCULAR; INTRAVENOUS PRN
Status: DISCONTINUED | OUTPATIENT
Start: 2018-05-03 | End: 2018-05-03 | Stop reason: HOSPADM

## 2018-05-03 RX ORDER — DOXERCALCIFEROL 4 UG/2ML
0.5 INJECTION INTRAVENOUS
Status: COMPLETED | OUTPATIENT
Start: 2018-05-03 | End: 2018-05-03

## 2018-05-03 RX ADMIN — CHLORHEXIDINE GLUCONATE 15 ML: 1.2 RINSE ORAL at 19:48

## 2018-05-03 RX ADMIN — SODIUM CHLORIDE 300 ML: 9 INJECTION, SOLUTION INTRAVENOUS at 08:52

## 2018-05-03 RX ADMIN — ALLOPURINOL 100 MG: 100 TABLET ORAL at 07:54

## 2018-05-03 RX ADMIN — FLUTICASONE PROPIONATE 2 SPRAY: 50 SPRAY, METERED NASAL at 19:48

## 2018-05-03 RX ADMIN — HEPARIN SODIUM 3000 UNITS: 1000 INJECTION, SOLUTION INTRAVENOUS; SUBCUTANEOUS at 12:30

## 2018-05-03 RX ADMIN — CALCIUM ACETATE 667 MG: 667 CAPSULE ORAL at 17:21

## 2018-05-03 RX ADMIN — ASPIRIN 81 MG CHEWABLE TABLET 81 MG: 81 TABLET CHEWABLE at 19:49

## 2018-05-03 RX ADMIN — TRAMADOL HYDROCHLORIDE 50 MG: 50 TABLET, COATED ORAL at 16:00

## 2018-05-03 RX ADMIN — Medication: at 08:52

## 2018-05-03 RX ADMIN — ATORVASTATIN CALCIUM 40 MG: 40 TABLET, FILM COATED ORAL at 19:49

## 2018-05-03 RX ADMIN — PROCHLORPERAZINE EDISYLATE 5 MG: 5 INJECTION INTRAMUSCULAR; INTRAVENOUS at 05:36

## 2018-05-03 RX ADMIN — INSULIN ASPART 2 UNITS: 100 INJECTION, SOLUTION INTRAVENOUS; SUBCUTANEOUS at 12:53

## 2018-05-03 RX ADMIN — DOXERCALCIFEROL 0.5 MCG: 4 INJECTION, SOLUTION INTRAVENOUS at 09:05

## 2018-05-03 RX ADMIN — POLYETHYLENE GLYCOL 3350, SODIUM SULFATE ANHYDROUS, SODIUM BICARBONATE, SODIUM CHLORIDE, POTASSIUM CHLORIDE 2000 ML: 236; 22.74; 6.74; 5.86; 2.97 POWDER, FOR SOLUTION ORAL at 05:18

## 2018-05-03 RX ADMIN — CHLORHEXIDINE GLUCONATE 15 ML: 1.2 RINSE ORAL at 07:55

## 2018-05-03 RX ADMIN — ALBUTEROL SULFATE 2.5 MG: 2.5 SOLUTION RESPIRATORY (INHALATION) at 19:40

## 2018-05-03 RX ADMIN — SODIUM CHLORIDE 250 ML: 9 INJECTION, SOLUTION INTRAVENOUS at 08:52

## 2018-05-03 RX ADMIN — OMEPRAZOLE 20 MG: 20 CAPSULE, DELAYED RELEASE ORAL at 19:49

## 2018-05-03 ASSESSMENT — PAIN DESCRIPTION - DESCRIPTORS: DESCRIPTORS: ACHING;SORE

## 2018-05-03 NOTE — PROGRESS NOTES
"  Nephrology Progress Note  05/03/2018         Assessment & Recommendations:   Murray Nicholson is a 63 yr old male with PMH of HTN, DMII, functionally bicuspid aortic valve, CHF/CM (EF 10-15%), ESRD, admitted with worsening dyspnea/SOB, now on dobutamine drip and being evaluated for heart/kidney transplant.      ESKD: due to DMII, on PD since Aug 2017, changed to iHD 1/2018 due to polymicrobial and fungal peritonitis, now on TTS schedule at [facility] under care of Dr Mcpherson. Access: tunneled RIJ (replaced 4/17/18). Run time: 4 hrs; EDW 77 kg.  - Dialysis per TTS schedule   - Heparin lock CVC          Volume: 77 kg, RHC 5/1 (done at 77 kg): RA 3, PCW 15  - Daily standing weights please     Severe AV and MR insufficiency:   BP/congestive CM (EF 10-15%); minimal CAD per angio on 2/8/17. Chronically hypotensive with tachycardia  - Goal MAP > 65 and SBP > 95 while dialyzing  - On dobutamine 2.5 mcg/kg/min (started 4/20/18)  - Being evaluated for heart/kidney tx (colonoscopy today)         Anemia: hgb 10.4; Recent labs with hgb in 11's, ferritin 371, IS 12, iron 36; on venofer 50 mg qTuesday, ANASTASIYA recently stopped.  - Continue venofer      BMD: calcium 9.6, alb 3.5, phos 3.2; recent ; on hectorol 0.5 mcg via HD; on phoslo 1 tab TID with meals.   - Continue hectorol via HD  - Continue Phoslo       Recommendations were communicated to primary team via this note.       Kristi Armas PA-C  Division of Kidney Disease  [phone]    Interval History :   Seen on dialysis, UF to dry weight; drinking colonoscopy prep fluids with significant stool output, having colonoscopy after dialysis. Denies n/v, CP, chills.    Review of Systems:   4 point ROS negative other than as noted above.    Physical Exam:   I/O last 3 completed shifts:  In: 4501.53 [P.O.:4360; I.V.:141.53]  Out: 500 [Emesis/NG output:500]   /77  Pulse 108  Temp 98.2  F (36.8  C) (Oral)  Resp 18  Ht 1.727 m (5' 8\")  Wt 78.3 kg (172 lb 9.9 oz)  " SpO2 99%  BMI 26.25 kg/m2     GENERAL APPEARANCE: alert, NAD  EYES: no scleral icterus, pupils equal   Pulmonary: lungs clear to auscultation with equal breath sounds bilaterally   CV: regular rhythm, tachycardia at baseline   - Edema none  GI: soft, nontender, normal bowel sounds  MS: no evidence of inflammation in joints, no muscle tenderness  : no doyle  SKIN: no rash, warm, dry, no cyanosis  NEURO: face symmetric, no asterixis   Access: tunneled [facility]       Labs:   All labs reviewed by me  Electrolytes/Renal -   Recent Labs   Lab Test  05/03/18   0530  05/02/18   0605  05/01/18   0930   04/21/18   0640   04/16/18   1546   NA  138  134  133   < >  135   < >  135   POTASSIUM  4.5  4.6  5.0   < >  4.0   < >  5.0   CHLORIDE  96  94  94   < >  96   < >  101   CO2  32  31  31   < >  24   < >  18*   BUN  21  18  36*   < >  41*   < >  63*   CR  7.36*  5.91*  8.51*   < >  7.72*   < >  8.81*   GLC  109*  66*  100*   < >  111*   < >  253*   TRINI  9.6  9.4  9.7   < >  9.2   < >  9.6   MAG  2.4*  1.9  2.4*   < >  1.6   < >  1.9   PHOS   --    --   3.2   --   5.4*   --   6.4*    < > = values in this interval not displayed.       CBC -   Recent Labs   Lab Test  05/03/18   0530  05/02/18   0605  05/01/18   0930   WBC  5.5  3.5*  4.6   HGB  10.4*  10.3*  10.0*   PLT  226  222  185       LFTs -   Recent Labs   Lab Test  04/16/18   1546  03/07/18   0833  02/19/18   1558  02/02/18   1736   ALKPHOS  123  129  102  86   BILITOTAL  0.8  0.7  0.6  0.4   ALT  22  21  15  16   AST  17  18  18  20   PROTTOTAL  7.4   --   7.2  6.2*   ALBUMIN  3.5   --   2.7*  2.1*       Iron Panel -   Recent Labs   Lab Test  07/19/17   1306  07/05/17   1204  06/21/17   1058   IRON  46  26*  69   IRONSAT  18  12*  29   ALBERTINA  369  542*  557*         Imaging:  Reviewed    Current Medications:    albuterol  2.5 mg Nebulization At Bedtime     allopurinol  100 mg Oral Daily     aspirin  81 mg Oral Daily     atorvastatin  40 mg Oral Daily     calcium acetate  667  mg Oral TID w/meals     chlorhexidine  15 mL Swish & Spit BID     fluticasone  1-2 spray Both Nostrils Daily     gelatin absorbable  1 each Topical During Hemodialysis (from stock)     heparin  3 mL Intracatheter During Hemodialysis (from stock)     heparin  3 mL Intracatheter During Hemodialysis (from stock)     insulin aspart  1-10 Units Subcutaneous TID AC     insulin aspart  1-7 Units Subcutaneous At Bedtime     NEPHROCAPS  1 capsule Oral Daily     omeprazole  20 mg Oral Daily     polyethylene glycol  17 g Oral Daily     senna-docusate  1 tablet Oral BID     sodium chloride (PF)  10 mL Intracatheter Q7 Days     sodium chloride (PF)  3 mL Intracatheter Q8H     sodium chloride (PF)  3 mL Intracatheter During Hemodialysis (from stock)     sodium chloride (PF)  3 mL Intracatheter During Hemodialysis (from stock)       DOBUTamine 2.5 mcg/kg/min (05/03/18 0759)     Reason ACE/ARB/ARNI order not selected       Reason beta blocker order not selected       Kristi Armas PA-C
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
## 2018-05-03 NOTE — PLAN OF CARE
Problem: Patient Care Overview  Goal: Plan of Care/Patient Progress Review  Outcome: No Change  D: Chronic systolic heart failure (H)  (primary encounter diagnosis)  End stage renal disease (H). Plan is for him to be listed for a kidney and heart transplant.    I: Monitored vitals and assessed patient status. He had dialysis today and they took off 1 L. Then he went down for a colonoscopy at 13:25 and is got back at 15:45  Running: Dobutamine @ 2.5 mcg/kg/min  PRN: Tramadol 50 mg every 6 hours. Had it at 16:00    A: Patient's vital signs have been stable. His rhythm was -120 with a BBB, long QT and a rare PVC.       Temp:  [97.5  F (36.4  C)-98.9  F (37.2  C)] 98.9  F (37.2  C)  Heart Rate:  [100-120] 112  Resp:  [0-40] 17  BP: ()/(59-85) 80/60  Cuff Mean (mmHg):  [81-95] 81  SpO2:  [96 %-100 %] 100 %      P: Continue to monitor Patient status and report changes to treatment team.
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
## 2018-05-03 NOTE — PLAN OF CARE
Problem: Patient Care Overview  Goal: Plan of Care/Patient Progress Review  Outcome: No Change  D: Pt who presents this admission for decompensated heart failure.   I/A: Pt alert and oriented x4. VSS. Pt sinus tach with HRs 100-110s. Pt reports discomfort in abdomen and nausea. Per pt, not sure if morning dose of zofran helped, had an emesis of 300 ml hours after dose. Provider contacted. Compazine ordered and given 1x; zofran also given 1x this shift. Pt started and finished 4L of Golytely this evening. Pt having BMs, per pt, they are becoming clear. BS this shift 137 and 260, insulin covered 1x. Dobutamine gtt continued at 2.5 mcg/kg.   P: Hemodialysis scheduled for tomorrow. Colonoscopy scheduled for tomorrow. Pt to [place] NPO at [place]. Continue to monitor and assess pt with every encounter. Notify Cards 2 with any changes or questions.

## 2018-05-03 NOTE — PROGRESS NOTES
Cardiac Rehab Discharge Summary    Reason for discharge:    Change in medical status.    Progress towards goals:  Pt came to CR for initial evaluation, then cancelled due to conflict inschedule, medical complications, and readmission.     Recommendation(s):    Resume Cardiac Rehab as indicated

## 2018-05-03 NOTE — PROGRESS NOTES
HEMODIALYSIS TREATMENT NOTE    Date: 5/3/2018  Time: 12:40 PM    Data:  Pre Wt:   78.3 kg   Desired Wt:   77.3 kg  Ultrafiltration - Post Run Net Total Removed (mL): 1000 mL  Ultrafiltration - Post Run Net Total Gain (mL): 0 mL  Vascular Access Status: Yes, secured and visible  Dialyzer Rinse: Streaked  Total Blood Volume Processed:   70.9 L  Total Dialysis (Treatment) Time:   3.5 hours    Lab:   No    Assessment/Interventions:  3.5 hours of HD via tunneled RIJ CVC on K2Ca2.5 bath. Tachycardic (per usual for patient) prior to arrival on unit and remained so throughout and post-treatment. All other VSS. 1.0 kg total removed without complications. Tolerated well. Finished golytely at 1140 for colonoscopy later today. Dobutamine gtt running @ 2.5 mcg/kg/min. Hectorol administered as ordered. CVC Heparin locked post-treatment. See MAR for medication details. Report given to primary RN on 6C.       Plan:    Per renal.

## 2018-05-03 NOTE — PLAN OF CARE
Problem: Patient Care Overview  Goal: Plan of Care/Patient Progress Review  Outcome: No Change  D: Heart failure. ESRD. Awaiting transplant decision.  I/A: VSS. Denies pain. Sinus tach. Dobutamine continues at 2.5 mcg/kg/min. Anuric/on dialysis. Bowel prep for colonoscopy today. As of 0400 stool is not yet clear. Still yellow/light brown. PRN compazine given for nausea.  P: Continue to monitor. Additional golytely this morning.

## 2018-05-03 NOTE — PROGRESS NOTES
[facility]   Cardiology II Service / Advanced Heart Failure  Daily Progress Note  Date of Service: 5/3/2018      Patient: Murray Nicholson  MRN: [id number]  Admission Date: 4/16/2018  Hospital Day # 17    Assessment and Plan: Murray is a 63 year old male with a PMH of CAD, ICM (EF of 15-20%), ESRD, CKD Stage V now on hemodialysis (previously on peritoneal dialysis with peritonitis in January 2018), DM II, HTN, dyslipidemia, bicuspid aortic valve, severe MR, and proximal AAA who was admitted for decompensated HF.  He is currently being worked up for heart/kidney transplant and remains on dobutamine 2.5 mcg/kg/min.       Today's Plan:   1.  Colonoscopy today:  Results:  Diverticulosis in the sigmoid colon.  Exam was otherwise normal.  No specimens collected.     2.  Plan for transfer to ICU tomorrow for swan placement and titration of dobutamine.  Formal decision will be made following conference in the am.      Chronic systolic heart failure secondary to ICM.  Stage D  NYHA Class III:   ACEi/ARB: contraindicated due to renal dysfunction  BB: contraindicated due to hypotension and is currently on inotropes  Aldosterone antagonist: contraindicated due to renal dysfunction  SCD prophylaxis: None.  Deferred.   Fluid status: hypervolemic but getting dialysis today.  Anticoagulation: None.   Antiplatelet:  ASA dose 81 mg daily.   Sleep apnea: Yes, unable to tolerate CPAP  NSAID use:  Contraindicated.  Avoid use.      ESRD:  Currently getting dialysis on T, Th, Sa   -Nephrology following.   -Appreciate recs.      DM II:  Last Hemoglobin A1C:  4/4:  6.3.  Home meds: Glipizide 5 mg daily.    -BG trends today: 100's with isolated reading at 191.  -Continue high intensity SS insulin  -BG controlled 102-178.  NPO most of the day secondary to colonoscopy.         NSVT:  No further episodes noted on monitor.  Tele SR/-130's.     -Continue to monitor for now        Chronic Medical Issues:  CAD:  Coronary angiogram  "2/8/17 showed no obstructive CAD.  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  Continue ASA 81 mg daily and Atorvastatin 40 mg daily.   AS and MR: Moving towards listing for heart/kidney transplant.  LVAD backup option for MVR and AVR no longer being considered.    Ascending aortic aneurysm:  4.5 x 4.5 cm proximal ascending aortic aneurysm seen on MRI done 2/14. Recent echo done 2/2/18 showed size of 4.2 cm. Continue to monitor with serial echoes.      FEN: 2 gm sodium diet  PROPHY:  Ambulation  LINES:  PIV  DISPO:  TBD based on hospital course  CODE STATUS:  Full    ================================================================    Interval History/ROS: Feels tired today.  Was up most of the night due to colon prep.  Denies SOB, PND, orthopnea, edema, chest pain, palpitations, lightheadedness, dizziness, near syncopal/syncopal episodes.  In dialysis currently.  No other concerns.  Been NPO since yesterday am.    Last 24 hr care team notes reviewed.   ROS:  4 point ROS including Respiratory, CV, GI and , other than that noted in the HPI, is negative.     Medications: Reviewed in EPIC.     Physical Exam:   /80 (BP Location: Right arm)  Pulse 108  Temp 98.5  F (36.9  C) (Oral)  Resp 18  Ht 1.727 m (5' 8\")  Wt 77.5 kg (170 lb 14.4 oz)  SpO2 98%  BMI 25.99 kg/m2  GENERAL: Appears alert and oriented times three.   HEENT: EOMI. Mucous membranes moist and without lesions.  NECK: Supple and without lymphadenopathy. JVD 11 cm.     CV: Tachycardic.   S1S2 present without murmur, rub, or gallop.   RESPIRATORY: Respirations regular, even, and unlabored. Lungs CTA throughout.   GI: Soft and non distended with normoactive bowel sounds present in all quadrants. No tenderness, rebound, guarding. No organomegaly.   EXTREMITIES: No peripheral edema. 2+ bilateral pedal pulses.   NEUROLOGIC: Alert and orientated x 3. CN II-XII grossly intact. No focal deficits.   MUSCULOSKELETAL: No joint " swelling or tenderness.   SKIN: No jaundice. No rashes, lesions, or cyanosis.    Data:  CMP  Recent Labs  Lab 05/03/18  0530 05/02/18  0605 05/01/18 0930 04/30/18  0445    134 133 135   POTASSIUM 4.5 4.6 5.0 4.7   CHLORIDE 96 94 94 96   CO2 32 31 31 31   ANIONGAP 9 8 8 8   * 66* 100* 101*   BUN 21 18 36* 27   CR 7.36* 5.91* 8.51* 6.30*   GFRESTIMATED 8* 10* 6* 9*   GFRESTBLACK 9* 12* 8* 11*   TRINI 9.6 9.4 9.7 8.9   MAG 2.4* 1.9 2.4* 2.3   PHOS  --   --  3.2  --      CBC  Recent Labs  Lab 05/03/18 0530 05/02/18 0605 05/01/18 0930 04/30/18  0445   WBC 5.5 3.5* 4.6 5.5   RBC 3.40* 3.39* 3.24* 3.23*   HGB 10.4* 10.3* 10.0* 10.1*   HCT 33.4* 32.9* 31.5* 31.4*   MCV 98 97 97 97   MCH 30.6 30.4 30.9 31.3   MCHC 31.1* 31.3* 31.7 32.2   RDW 15.2* 15.3* 15.3* 15.2*    222 185 201     INRNo lab results found in last 7 days.      Patient seen and discussed with Dr. Blum.     Kristi MARTIN, CNP  [phone]  Cards II Service  5/3/2018

## 2018-05-03 NOTE — ADDENDUM NOTE
Encounter addended by: Marianne Scott, OT on: 5/3/2018  3:43 PM<BR>     Actions taken: Sign clinical note, Episode deleted

## 2018-05-03 NOTE — OR NURSING
Colonoscopy completed, no interventions.  Sedation given per MD instruction. Report called to pt's floor nurse post-procedure.  Pt taken to recovery by RN.

## 2018-05-04 ENCOUNTER — COMMITTEE REVIEW (OUTPATIENT)
Dept: TRANSPLANT | Facility: CLINIC | Age: 63
End: 2018-05-04

## 2018-05-04 ENCOUNTER — APPOINTMENT (OUTPATIENT)
Dept: GENERAL RADIOLOGY | Facility: CLINIC | Age: 63
DRG: 001 | End: 2018-05-04
Attending: NURSE PRACTITIONER
Payer: COMMERCIAL

## 2018-05-04 LAB
ANION GAP SERPL CALCULATED.3IONS-SCNC: 10 MMOL/L (ref 3–14)
BUN SERPL-MCNC: 12 MG/DL (ref 7–30)
CALCIUM SERPL-MCNC: 8.9 MG/DL (ref 8.5–10.1)
CHLORIDE SERPL-SCNC: 95 MMOL/L (ref 94–109)
CO2 SERPL-SCNC: 31 MMOL/L (ref 20–32)
CREAT SERPL-MCNC: 5.86 MG/DL (ref 0.66–1.25)
GFR SERPL CREATININE-BSD FRML MDRD: 10 ML/MIN/1.7M2
GLUCOSE BLDC GLUCOMTR-MCNC: 123 MG/DL (ref 70–99)
GLUCOSE BLDC GLUCOMTR-MCNC: 125 MG/DL (ref 70–99)
GLUCOSE BLDC GLUCOMTR-MCNC: 159 MG/DL (ref 70–99)
GLUCOSE BLDC GLUCOMTR-MCNC: 223 MG/DL (ref 70–99)
GLUCOSE SERPL-MCNC: 104 MG/DL (ref 70–99)
POTASSIUM SERPL-SCNC: 4.2 MMOL/L (ref 3.4–5.3)
SODIUM SERPL-SCNC: 136 MMOL/L (ref 133–144)

## 2018-05-04 PROCEDURE — 97110 THERAPEUTIC EXERCISES: CPT | Mod: GO | Performed by: OCCUPATIONAL THERAPIST

## 2018-05-04 PROCEDURE — 25000132 ZZH RX MED GY IP 250 OP 250 PS 637: Performed by: INTERNAL MEDICINE

## 2018-05-04 PROCEDURE — 25000132 ZZH RX MED GY IP 250 OP 250 PS 637: Mod: GY

## 2018-05-04 PROCEDURE — A9270 NON-COVERED ITEM OR SERVICE: HCPCS | Mod: GY

## 2018-05-04 PROCEDURE — A9270 NON-COVERED ITEM OR SERVICE: HCPCS | Mod: GY | Performed by: STUDENT IN AN ORGANIZED HEALTH CARE EDUCATION/TRAINING PROGRAM

## 2018-05-04 PROCEDURE — 21400006 ZZH R&B CCU INTERMEDIATE UMMC

## 2018-05-04 PROCEDURE — 40000558 ZZH STATISTIC CVC DRESSING CHANGE

## 2018-05-04 PROCEDURE — 25000132 ZZH RX MED GY IP 250 OP 250 PS 637: Performed by: NURSE PRACTITIONER

## 2018-05-04 PROCEDURE — 25000132 ZZH RX MED GY IP 250 OP 250 PS 637: Performed by: STUDENT IN AN ORGANIZED HEALTH CARE EDUCATION/TRAINING PROGRAM

## 2018-05-04 PROCEDURE — 40000802 ZZH SITE CHECK

## 2018-05-04 PROCEDURE — 25000128 H RX IP 250 OP 636: Performed by: NURSE PRACTITIONER

## 2018-05-04 PROCEDURE — 99233 SBSQ HOSP IP/OBS HIGH 50: CPT | Performed by: NURSE PRACTITIONER

## 2018-05-04 PROCEDURE — 40000275 ZZH STATISTIC RCP TIME EA 10 MIN

## 2018-05-04 PROCEDURE — 40000986 XR CHEST PORT 1 VW

## 2018-05-04 PROCEDURE — 25000125 ZZHC RX 250: Performed by: STUDENT IN AN ORGANIZED HEALTH CARE EDUCATION/TRAINING PROGRAM

## 2018-05-04 PROCEDURE — 40000133 ZZH STATISTIC OT WARD VISIT: Performed by: OCCUPATIONAL THERAPIST

## 2018-05-04 PROCEDURE — 94640 AIRWAY INHALATION TREATMENT: CPT | Mod: 76

## 2018-05-04 PROCEDURE — 00000146 ZZHCL STATISTIC GLUCOSE BY METER IP

## 2018-05-04 PROCEDURE — A9270 NON-COVERED ITEM OR SERVICE: HCPCS | Mod: GY | Performed by: INTERNAL MEDICINE

## 2018-05-04 PROCEDURE — 25000128 H RX IP 250 OP 636: Performed by: INTERNAL MEDICINE

## 2018-05-04 RX ORDER — UBIDECARENONE 75 MG
100 CAPSULE ORAL DAILY
Status: DISCONTINUED | OUTPATIENT
Start: 2018-05-04 | End: 2018-06-15

## 2018-05-04 RX ADMIN — TRAMADOL HYDROCHLORIDE 50 MG: 50 TABLET, COATED ORAL at 10:13

## 2018-05-04 RX ADMIN — CALCIUM ACETATE 667 MG: 667 CAPSULE ORAL at 18:30

## 2018-05-04 RX ADMIN — INSULIN ASPART 4 UNITS: 100 INJECTION, SOLUTION INTRAVENOUS; SUBCUTANEOUS at 18:31

## 2018-05-04 RX ADMIN — OMEPRAZOLE 20 MG: 20 CAPSULE, DELAYED RELEASE ORAL at 21:19

## 2018-05-04 RX ADMIN — VITAMIN B12 0.1 MG ORAL TABLET 100 MCG: 0.1 TABLET ORAL at 18:30

## 2018-05-04 RX ADMIN — ATORVASTATIN CALCIUM 40 MG: 40 TABLET, FILM COATED ORAL at 21:20

## 2018-05-04 RX ADMIN — SENNOSIDES AND DOCUSATE SODIUM 1 TABLET: 8.6; 5 TABLET ORAL at 10:13

## 2018-05-04 RX ADMIN — ASPIRIN 81 MG CHEWABLE TABLET 81 MG: 81 TABLET CHEWABLE at 21:19

## 2018-05-04 RX ADMIN — ALLOPURINOL 100 MG: 100 TABLET ORAL at 10:13

## 2018-05-04 RX ADMIN — CHLORHEXIDINE GLUCONATE 15 ML: 1.2 RINSE ORAL at 10:20

## 2018-05-04 RX ADMIN — ALBUTEROL SULFATE 2.5 MG: 2.5 SOLUTION RESPIRATORY (INHALATION) at 21:52

## 2018-05-04 RX ADMIN — INSULIN ASPART 1 UNITS: 100 INJECTION, SOLUTION INTRAVENOUS; SUBCUTANEOUS at 13:49

## 2018-05-04 RX ADMIN — FLUTICASONE PROPIONATE 2 SPRAY: 50 SPRAY, METERED NASAL at 21:20

## 2018-05-04 RX ADMIN — CALCIUM ACETATE 667 MG: 667 CAPSULE ORAL at 13:30

## 2018-05-04 RX ADMIN — SENNOSIDES AND DOCUSATE SODIUM 1 TABLET: 8.6; 5 TABLET ORAL at 21:20

## 2018-05-04 RX ADMIN — POLYETHYLENE GLYCOL 3350 17 G: 17 POWDER, FOR SOLUTION ORAL at 16:01

## 2018-05-04 RX ADMIN — ALTEPLASE 2 MG: 2.2 INJECTION, POWDER, LYOPHILIZED, FOR SOLUTION INTRAVENOUS at 11:36

## 2018-05-04 RX ADMIN — Medication 1 CAPSULE: at 10:13

## 2018-05-04 RX ADMIN — DOBUTAMINE HYDROCHLORIDE 2.5 MCG/KG/MIN: 400 INJECTION INTRAVENOUS at 06:29

## 2018-05-04 ASSESSMENT — PAIN DESCRIPTION - DESCRIPTORS: DESCRIPTORS: ACHING

## 2018-05-04 NOTE — PROGRESS NOTES
Care Coordinator Progress Note    Admission Date/Time:  4/16/2018  Attending MD:  Bobby Muse MD    Data  Chart reviewed, discussed with interdisciplinary team.   Patient was admitted for:    Chronic systolic heart failure (H)  End stage renal disease (H).    Full assessment completed in previous note.  Concerns with insurance coverage for discharge needs: TBD  Current Living Situation: Patient lives alone.  Support System: Supportive and Involved sons.   Services Involved: [facility]   Transportation at Discharge: TBD  Transportation to Medical Appointments: TBD  Barriers to Discharge: Chronic Illness    Per NP, workup for heart/kidney transplant continues with plan for GI consult and SW consult today. Per NP, pt needs endoscopic ultrasound with fine need aspiration for biopsy of IPMNs to rule out malignancy before presenting for transplant, this is scheduled 5/7.   Pt currently on IV dobutamine drip and cardiac monitoring inpatient. Unable to determine discharge needs, pending ongoing transplant workup.     Plan  Anticipated Discharge Date: TBD  Anticipated Discharge Plan: TBD  CC will continue to monitor patient's medical condition and progress towards discharge.  Kavitha Bates RN BSN  6C Unit Care Coordinator  Phone number: [phone]  Pager: [phone]

## 2018-05-04 NOTE — PLAN OF CARE
Problem: Patient Care Overview  Goal: Plan of Care/Patient Progress Review  D: Chronic systolic heart failure (H)  (primary encounter diagnosis)  End stage renal disease (H)   Heart and Kidney tx w/u    I: Monitored vitals and assessed pt status.   Changed:  Running:Dobutamine continues at 2.5 mcg/kg/min.   PRN:    A: A0x4. VSS, on RA. SR/ST. Colonoscopy and HD completed. Exp wheezes noted LLL. RN collect labs via PICC.         Temp:  [98.2  F (36.8  C)-99.1  F (37.3  C)] 99.1  F (37.3  C)  Heart Rate:  [] 105  Resp:  [0-40] 18  BP: ()/(53-85) 96/66  Cuff Mean (mmHg):  [65-95] 65  SpO2:  [96 %-100 %] 100 %      P: Continue to monitor Pt status and report changes to treatment team.
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
## 2018-05-04 NOTE — PLAN OF CARE
Problem: Patient Care Overview  Goal: Plan of Care/Patient Progress Review  OT/CR 6C:  Discharge Planner OT   Patient plan for discharge: Pt reports he anticipates transplant prior to discharge.   Current status: Pt ambulated 2x200 ft with IV pole and SBA, with seated rest break between bouts. Pt tolerated 15 minutes on nustep with normal cardiac response. Pt completed x4 LE step-up exercises with SBA, for 30 seconds each exercise, with 30 seconds rest between each. Pt completed x15 reps total sit to stand from chair with SBA, no seated rest breaks between each.   Barriers to return to prior living situation: medical status   Recommendations for discharge: Per plan established by the Physical/Occupational Therapist, the recommendation for discharge location is Home with OP CR Phase II.   Rationale for recommendations: Pt is independent with basic mobility and self cares though would benefit from continued therapy to increase activity tolerance and aerobic conditioning.       Entered by: Harika Murray 05/04/2018 8:56 AM

## 2018-05-04 NOTE — PROGRESS NOTES
"CLINICAL NUTRITION SERVICES - REASSESSMENT NOTE     Nutrition Prescription    RECOMMENDATIONS FOR MDs/PROVIDERS TO ORDER:  Rec thiamine (100 mg/day) if pt has NYHA Class III-IV heart failure.      Future/Additional Recommendations:  1. Continue current diet order, liberalized to help encourage oral intake. Monitor potential need for low-sodium diet and/or fluid restriction. In addition, monitor K+ trends and phos trends. Diet restrictions may be added as a \"Combination Diet.\"  2. Monitor BG control. DM II hx. Hgb A1c of 8.6 on 2/2/18 and then 7 on 4/26/18.  3. Consider checking vitamin D lab. Pt's 25 OH vitamin D total was 23 on 4/11/17. Pt currently on doxercalciferol.   4. Continue phos binder as per team.   5. Consider checking folic acid and vitamin B12.   6. Continue nephrocaps, as ordered.   7. Bowel regimen if needed.  8. Consider scheduling antiemetics/antinauseants ~20 minutes prior to meals to help optimize nausea control.        EVALUATION OF THE PROGRESS TOWARD GOALS   Diet: Regular diet order and on a 2 L fluid restriction. Has a prn supplement order.  Intake: Flowsheets indicate good diet tolerance. Pt consuming 100% of meals with a good appetite 4/26-4/27, 50% of a meal and then vomited 4/28, 75% of meals with a good appetite 4/29, % of meals with a fair to good appetite 4/30, 75% of a meal 5/1, 75% of meal with a good appetite 5/2-5/3. Attempts x 2 to see pt. Pt busy with RN. Factors affecting oral intake include NPO status at times for tests/procedures (recent colonoscopy), N/V intermittent, and possible constipation at times.     NEW FINDINGS   N/A    MALNUTRITION  % Intake: Suspect < 75% for > 7 days (non-severe)  % Weight Loss: Weight loss does not meet criteria. Also, difficult to assess with fluid status changes and dialysis  Subcutaneous Fat Loss: Unable to assess, pt busy with RN. Pt was not in room when attempting to see. None noted per RD 4/16/18.  Muscle Loss: Unable to assess, pt " busy with RN. Pt was not in room when attempting to see. None noted per RD 4/16/18.  Fluid Accumulation/Edema: Not meeting this criteria.     Malnutrition Diagnosis: Unable to determine due to pt busy with RN    Previous Goals   Patient to consume % of nutritionally adequate meal trays TID, or the equivalent with supplements/snacks.  Evaluation: Not met consistently.    Previous Nutrition Diagnosis  Predicted inadequate nutrient intake (protein-energy) related to potential upcoming surgery and diet restrictions.   Evaluation: Unresolved. Changed to new nutrition dx below.    CURRENT NUTRITION DIAGNOSIS  Inadequate oral intake related to NPO status at times for tests/procedures (recent colonoscopy), N/V intermittent, and possible constipation at times as evidenced by pt consuming 50% of meals at times.      INTERVENTIONS  Implementation  None additionally at this time.     Goals  Patient to consume % of nutritionally adequate meal trays TID, or the equivalent with supplements/snacks.    Monitoring/Evaluation  Progress toward goals will be monitored and evaluated per protocol.     Nutrition will continue to follow.      Mary Silva, MS, RD, LD, [facility]   6C Pgr: [phone]

## 2018-05-04 NOTE — PROGRESS NOTES
[facility]   Cardiology II Service / Advanced Heart Failure  Daily Progress Note  Date of Service: 5/4/2018      Patient: Murray Nicholson  MRN: [id number]  Admission Date: 4/16/2018  Hospital Day # 18    Assessment and Plan: Murray is a 63 year old male with a PMH of CAD, ICM (EF of 15-20%), ESRD, CKD Stage V now on hemodialysis (previously on peritoneal dialysis with peritonitis in January 2018), DM II, HTN, dyslipidemia, bicuspid aortic valve, severe MR, and proximal AAA who was admitted for decompensated HF.  He is currently being worked up for heart/kidney transplant and remains on dobutamine 2.5 mcg/kg/min. Workup that is pending for transplant is social work.  (completed today).  He also needs a EUS with FNA (scheduled on Monday 5/7) for biopsy of IPMN's to rule out malignancy before re-presenting to team for consideration for heart transplant and kidney transplant.  Dr. Austin saw the patient as well today and discussed compliance issues. Patient is agreeable to do everything needed to get listed.  Discussed should we proceed with listing once committee approves, and GI/social work has cleared, he would be transferred to the ICU for swan and titration of inotropes. He is agreeable to this.          Today's Plan:   1.  EUS with FNA on Monday.  NPO after midnight Sunday evening.    2.  Dermatology consult next week to assess back and leg skin lesions per renal's recommendations prior to consideration for listing for heart/kidney transplant.    3.  Draw Factor 2, 5, cardiolipin, lupus inhibitor panel, and C peptide per transplant's recommendations with dialysis run tomorrow.   4.  CXR today to confirm PICC placement today.        Chronic systolic heart failure secondary to ICM.  Stage D  NYHA Class III:   ACEi/ARB: contraindicated due to renal dysfunction  BB: contraindicated due to hypotension and is currently on inotropes  Aldosterone antagonist: contraindicated due to renal dysfunction  SCD  prophylaxis: None.  Deferred.   Fluid status: Euvolemic.  Anticoagulation: None.   Antiplatelet:  ASA dose 81 mg daily.   Sleep apnea: Yes, unable to tolerate CPAP  NSAID use:  Contraindicated.  Avoid use.        ESRD:  Currently getting dialysis on T, Th, Sa   -Nephrology following.   -Appreciate recs.       DM II:  Last Hemoglobin A1C:  4/4:  6.3.  Home meds: Glipizide 5 mg daily.     -Continue high intensity SS insulin  -BG controlled 123-159.             NSVT:  14 beat run of NSVT today on telemetry.  Asymptomatic at the time.   -Tele SR/-130's.     -Continue to monitor for now.  Call team for increasing ectopy.     Intraductal papillary mucinous neoplasms:  4/16/18:  MR of abdomen showed:  Limited MR assessment due to exam interruption.  Multiple cystic lesions in the pancreas, compatible with IPMNs. The largest of these measures 2.0 cm in maximum dimension. No main duct dilation or suspicious nodularity or septation.   -GI consulted today.  Appreciate recs  -EUS with FNA scheduled for Monday am in OR.  NPO after midnight Monday.     Chronic Medical Issues:  CAD:  Coronary angiogram 2/8/17 showed no obstructive CAD.  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  Continue ASA 81 mg daily and Atorvastatin 40 mg daily.   AS and MR: Moving towards listing for heart/kidney transplant. Need SW consult and GI consult as outlined.  LVAD backup option for MVR and AVR no longer being considered.    Ascending aortic aneurysm:  4.5 x 4.5 cm proximal ascending aortic aneurysm seen on MRI done 2/14. Recent echo done 2/2/18 showed size of 4.2 cm. Continue to monitor with serial echoes.      FEN: 2 gm sodium diet  PROPHY:  Ambulation  LINES:  PIV  DISPO:  TBD based on hospital course  CODE STATUS:  Full  ================================================================    Interval History/ROS: Murray is doing ok. Enjoying lasagna dinner.  No changes in symptoms.  Denies SOB at rest, PND, orthopnea,  "edema, weight gain, chest pain, palpitations, lightheadedness, dizziness, near syncopal/syncopal episodes.  Asymptomatic with VT run today.  Nursing had difficulty pulling blood off PICC today.        Last 24 hr care team notes reviewed.   ROS:  4 point ROS including Respiratory, CV, GI and , other than that noted in the HPI, is negative.     Medications: Reviewed in EPIC.     Physical Exam:   /71 (BP Location: Right arm)  Pulse 108  Temp 98.6  F (37  C) (Oral)  Resp 18  Ht 1.727 m (5' 8\")  Wt 76.8 kg (169 lb 6.4 oz)  SpO2 100%  BMI 25.76 kg/m2  GENERAL: Appears alert and oriented times three.   HEENT: EOMI. Mucous membranes moist and without lesions.  NECK: Supple and without lymphadenopathy. JVD just above clavicle.    CV: Tachycardic, S1S2 present without murmur, rub, or gallop.   RESPIRATORY: Respirations regular, even, and unlabored. Lungs CTA throughout.   GI: Soft and non distended with normoactive bowel sounds present in all quadrants. No tenderness, rebound, guarding. No organomegaly.   EXTREMITIES: No peripheral edema. 2+ bilateral pedal pulses.   NEUROLOGIC: Alert and orientated x 3. CN II-XII grossly intact. No focal deficits.   MUSCULOSKELETAL: No joint swelling or tenderness.   SKIN: No jaundice. No rashes, lesions, or cyanosis.    Data:  CMP  Recent Labs  Lab 05/04/18  0645 05/03/18  0530 05/02/18  0605 05/01/18  0930 04/30/18  0445    138 134 133 135   POTASSIUM 4.2 4.5 4.6 5.0 4.7   CHLORIDE 95 96 94 94 96   CO2 31 32 31 31 31   ANIONGAP 10 9 8 8 8   * 109* 66* 100* 101*   BUN 12 21 18 36* 27   CR 5.86* 7.36* 5.91* 8.51* 6.30*   GFRESTIMATED 10* 8* 10* 6* 9*   GFRESTBLACK 12* 9* 12* 8* 11*   TRINI 8.9 9.6 9.4 9.7 8.9   MAG  --  2.4* 1.9 2.4* 2.3   PHOS  --   --   --  3.2  --      CBC  Recent Labs  Lab 05/03/18  0530 05/02/18  0605 05/01/18  0930 04/30/18  0445   WBC 5.5 3.5* 4.6 5.5   RBC 3.40* 3.39* 3.24* 3.23*   HGB 10.4* 10.3* 10.0* 10.1*   HCT 33.4* 32.9* 31.5* 31.4* "   MCV 98 97 97 97   MCH 30.6 30.4 30.9 31.3   MCHC 31.1* 31.3* 31.7 32.2   RDW 15.2* 15.3* 15.3* 15.2*    222 185 201     INRNo lab results found in last 7 days.      Patient seen and discussed with Dr. Austin.      Kristi MARTIN, CNP  [phone]  Cards II Service  5/4/2018

## 2018-05-05 LAB
FACT V ACT/NOR PPP: 117 % (ref 60–140)
FOLATE SERPL-MCNC: 58.5 NG/ML
GLUCOSE BLDC GLUCOMTR-MCNC: 109 MG/DL (ref 70–99)
GLUCOSE BLDC GLUCOMTR-MCNC: 146 MG/DL (ref 70–99)
GLUCOSE BLDC GLUCOMTR-MCNC: 192 MG/DL (ref 70–99)
GLUCOSE BLDC GLUCOMTR-MCNC: 202 MG/DL (ref 70–99)
PROTHROM ACT/NOR PPP: 51 % (ref 60–140)

## 2018-05-05 PROCEDURE — 25000132 ZZH RX MED GY IP 250 OP 250 PS 637: Performed by: STUDENT IN AN ORGANIZED HEALTH CARE EDUCATION/TRAINING PROGRAM

## 2018-05-05 PROCEDURE — 25000132 ZZH RX MED GY IP 250 OP 250 PS 637: Performed by: INTERNAL MEDICINE

## 2018-05-05 PROCEDURE — 85613 RUSSELL VIPER VENOM DILUTED: CPT | Performed by: NURSE PRACTITIONER

## 2018-05-05 PROCEDURE — 00000401 ZZHCL STATISTIC THROMBIN TIME NC: Performed by: NURSE PRACTITIONER

## 2018-05-05 PROCEDURE — 00000167 ZZHCL STATISTIC INR NC: Performed by: NURSE PRACTITIONER

## 2018-05-05 PROCEDURE — 85220 BLOOC CLOT FACTOR V TEST: CPT | Performed by: NURSE PRACTITIONER

## 2018-05-05 PROCEDURE — A9270 NON-COVERED ITEM OR SERVICE: HCPCS | Mod: GY | Performed by: INTERNAL MEDICINE

## 2018-05-05 PROCEDURE — 90937 HEMODIALYSIS REPEATED EVAL: CPT

## 2018-05-05 PROCEDURE — 86147 CARDIOLIPIN ANTIBODY EA IG: CPT | Performed by: NURSE PRACTITIONER

## 2018-05-05 PROCEDURE — 84681 ASSAY OF C-PEPTIDE: CPT | Performed by: NURSE PRACTITIONER

## 2018-05-05 PROCEDURE — 25000125 ZZHC RX 250: Performed by: STUDENT IN AN ORGANIZED HEALTH CARE EDUCATION/TRAINING PROGRAM

## 2018-05-05 PROCEDURE — 86146 BETA-2 GLYCOPROTEIN ANTIBODY: CPT | Performed by: NURSE PRACTITIONER

## 2018-05-05 PROCEDURE — 25000128 H RX IP 250 OP 636: Performed by: INTERNAL MEDICINE

## 2018-05-05 PROCEDURE — 21400006 ZZH R&B CCU INTERMEDIATE UMMC

## 2018-05-05 PROCEDURE — 82306 VITAMIN D 25 HYDROXY: CPT | Performed by: NURSE PRACTITIONER

## 2018-05-05 PROCEDURE — A9270 NON-COVERED ITEM OR SERVICE: HCPCS | Mod: GY | Performed by: STUDENT IN AN ORGANIZED HEALTH CARE EDUCATION/TRAINING PROGRAM

## 2018-05-05 PROCEDURE — 85597 PHOSPHOLIPID PLTLT NEUTRALIZ: CPT | Performed by: NURSE PRACTITIONER

## 2018-05-05 PROCEDURE — 25000128 H RX IP 250 OP 636: Performed by: NURSE PRACTITIONER

## 2018-05-05 PROCEDURE — 40000275 ZZH STATISTIC RCP TIME EA 10 MIN: Performed by: OPTOMETRIST

## 2018-05-05 PROCEDURE — 00000146 ZZHCL STATISTIC GLUCOSE BY METER IP

## 2018-05-05 PROCEDURE — 85730 THROMBOPLASTIN TIME PARTIAL: CPT | Performed by: NURSE PRACTITIONER

## 2018-05-05 PROCEDURE — 99232 SBSQ HOSP IP/OBS MODERATE 35: CPT | Mod: GC | Performed by: INTERNAL MEDICINE

## 2018-05-05 PROCEDURE — 85210 CLOT FACTOR II PROTHROM SPEC: CPT | Performed by: NURSE PRACTITIONER

## 2018-05-05 PROCEDURE — 82746 ASSAY OF FOLIC ACID SERUM: CPT | Performed by: NURSE PRACTITIONER

## 2018-05-05 PROCEDURE — 94640 AIRWAY INHALATION TREATMENT: CPT | Performed by: OPTOMETRIST

## 2018-05-05 PROCEDURE — 25000132 ZZH RX MED GY IP 250 OP 250 PS 637: Performed by: NURSE PRACTITIONER

## 2018-05-05 PROCEDURE — 85732 THROMBOPLASTIN TIME PARTIAL: CPT | Performed by: NURSE PRACTITIONER

## 2018-05-05 RX ORDER — DOXERCALCIFEROL 4 UG/2ML
0.5 INJECTION INTRAVENOUS
Status: COMPLETED | OUTPATIENT
Start: 2018-05-05 | End: 2018-05-05

## 2018-05-05 RX ORDER — HEPARIN SODIUM 1000 [USP'U]/ML
500 INJECTION, SOLUTION INTRAVENOUS; SUBCUTANEOUS CONTINUOUS
Status: DISCONTINUED | OUTPATIENT
Start: 2018-05-05 | End: 2018-05-17

## 2018-05-05 RX ADMIN — SENNOSIDES AND DOCUSATE SODIUM 1 TABLET: 8.6; 5 TABLET ORAL at 19:36

## 2018-05-05 RX ADMIN — TRAMADOL HYDROCHLORIDE 50 MG: 50 TABLET, COATED ORAL at 02:26

## 2018-05-05 RX ADMIN — HEPARIN SODIUM 3000 UNITS: 1000 INJECTION, SOLUTION INTRAVENOUS; SUBCUTANEOUS at 16:34

## 2018-05-05 RX ADMIN — HEPARIN SODIUM 500 UNITS/HR: 1000 INJECTION, SOLUTION INTRAVENOUS; SUBCUTANEOUS at 15:33

## 2018-05-05 RX ADMIN — VITAMIN B12 0.1 MG ORAL TABLET 100 MCG: 0.1 TABLET ORAL at 07:50

## 2018-05-05 RX ADMIN — FLUTICASONE PROPIONATE 2 SPRAY: 50 SPRAY, METERED NASAL at 19:39

## 2018-05-05 RX ADMIN — SENNOSIDES AND DOCUSATE SODIUM 1 TABLET: 8.6; 5 TABLET ORAL at 07:49

## 2018-05-05 RX ADMIN — INSULIN ASPART 3 UNITS: 100 INJECTION, SOLUTION INTRAVENOUS; SUBCUTANEOUS at 14:03

## 2018-05-05 RX ADMIN — ALBUTEROL SULFATE 2.5 MG: 2.5 SOLUTION RESPIRATORY (INHALATION) at 20:10

## 2018-05-05 RX ADMIN — INSULIN ASPART 1 UNITS: 100 INJECTION, SOLUTION INTRAVENOUS; SUBCUTANEOUS at 07:54

## 2018-05-05 RX ADMIN — DOXERCALCIFEROL 0.5 MCG: 4 INJECTION, SOLUTION INTRAVENOUS at 15:33

## 2018-05-05 RX ADMIN — Medication 1 CAPSULE: at 07:50

## 2018-05-05 RX ADMIN — ASPIRIN 81 MG CHEWABLE TABLET 81 MG: 81 TABLET CHEWABLE at 19:36

## 2018-05-05 RX ADMIN — CALCIUM ACETATE 667 MG: 667 CAPSULE ORAL at 19:36

## 2018-05-05 RX ADMIN — SODIUM CHLORIDE 300 ML: 9 INJECTION, SOLUTION INTRAVENOUS at 13:15

## 2018-05-05 RX ADMIN — CALCIUM ACETATE 667 MG: 667 CAPSULE ORAL at 07:49

## 2018-05-05 RX ADMIN — ATORVASTATIN CALCIUM 40 MG: 40 TABLET, FILM COATED ORAL at 19:36

## 2018-05-05 RX ADMIN — ALLOPURINOL 100 MG: 100 TABLET ORAL at 07:50

## 2018-05-05 RX ADMIN — PROCHLORPERAZINE EDISYLATE 5 MG: 5 INJECTION INTRAMUSCULAR; INTRAVENOUS at 19:49

## 2018-05-05 RX ADMIN — OMEPRAZOLE 20 MG: 20 CAPSULE, DELAYED RELEASE ORAL at 19:36

## 2018-05-05 RX ADMIN — SODIUM CHLORIDE 250 ML: 9 INJECTION, SOLUTION INTRAVENOUS at 13:16

## 2018-05-05 ASSESSMENT — PAIN DESCRIPTION - DESCRIPTORS: DESCRIPTORS: ACHING

## 2018-05-05 NOTE — PLAN OF CARE
Problem: Cardiac: Heart Failure (Adult)  Goal: Signs and Symptoms of Listed Potential Problems Will be Absent, Minimized or Managed (Cardiac: Heart Failure)  Signs and symptoms of listed potential problems will be absent, minimized or managed by discharge/transition of care (reference Cardiac: Heart Failure (Adult) CPG).   Outcome: No Change  D/I: Monitor shows -135 with PACs and PVCs. 1 episode 14 beats wide complex tachycardia. Asymptomatic. Provider aware. Continues on dobutamine drip at 2.5 mcg/kg/min. Denies shortness of breath. C/O leg and lower abdominal pain, relieved with tramadol. Poor PO intake, liberalized diet. Up in room and in halls independently without difficulties. Visits by family and friends today. Went outside with friend. States this is the best that he has felt since admit. See flowsheets for assessments and additional data.  A: Stable HF.   P: Continue dobutamine as ordered. Assess and treat for pain as indicated. Encourage PO intake. Encourage activity as able. Cotninue pre-transplant w/u testing/consults. Plan for dialysis tomorrow. Continue current cares and notify providers with questions or concerns.    05/04/18 1921   Cardiac: Heart Failure   Problems Assessed (Heart Failure) all   Problems Present (Heart Failure) cardiac pump dysfunction;dysrhythmia/arrhythmia;situational response

## 2018-05-05 NOTE — PLAN OF CARE
Problem: Patient Care Overview  Goal: Plan of Care/Patient Progress Review  Outcome: No Change    D: Pt with hx ICM undergoing heart/kidney transplant w/u.   I/A: Dobutamine gtt at 2.5 mcg/kg/min. 's-130's with BBB. Tramadol given x1. BG monitored. Pt independent with cares. Aneuric (states he voids dribbles with BM's). HD T,TH,Sat.  P: Plan for HD today. Continue transplant w/u (test scheduled for Monday). Monitor and assess pt condition and contact treatment team with questions or concerns.

## 2018-05-05 NOTE — PROGRESS NOTES
[facility]   Cardiology II Service / Advanced Heart Failure  Daily Progress Note  Date of Service: 5/4/2018      Patient: Murray Nicholson  MRN: [id number]  Admission Date: 4/16/2018  Hospital Day # 19    Assessment and Plan: Murray is a 63 year old male with a PMH of CAD, ICM (EF of 15-20%), ESRD, bicuspid aortic valve, severe MR, and proximal AAA who was admitted for decompensated HF.  He is currently being worked up for heart/kidney transplant and remains on dobutamine 2.5 mcg/kg/min.      Today's Plan:   - Continue inotrope  - HD today    # Chronic systolic heart failure secondary to ICM.  Stage D  NYHA Class III:   ACEi/ARB: contraindicated due to renal dysfunction  BB: contraindicated due to hypotension and is currently on inotropes  Aldosterone antagonist: contraindicated due to renal dysfunction  SCD prophylaxis: None.  Deferred.   Fluid status: Euvolemic.  Anticoagulation: None.   Antiplatelet:  ASA dose 81 mg daily.   Sleep apnea: Yes, unable to tolerate CPAP  NSAID use:  Contraindicated.  Avoid use.  - Transplant workup pending: EUS on Monday and derm consult.      Intraductal papillary mucinous neoplasms:  4/16/18:  MR of abdomen showed:  Limited MR assessment due to exam interruption.  Multiple cystic lesions in the pancreas, compatible with IPMNs. The largest of these measures 2.0 cm in maximum dimension. No main duct dilation or suspicious nodularity or septation.   -GI consulted today.  Appreciate recs  -EUS with FNA scheduled for Monday am in OR.  NPO after midnight Monday.    Chronic Medical Issues:  # ESRD:  Currently getting dialysis on T, Th, Sa   # DM II:  Last Hemoglobin A1C:  4/4:  6.3.  Home meds: Glipizide 5 mg daily.   Continue high intensity SS insulin  # NSVT:  Telemetry, replace lytes   # CAD:  Coronary angiogram 2/8/17 showed no obstructive CAD.  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  Continue ASA 81 mg daily and Atorvastatin 40 mg daily.   # AS  "and MR: Moving towards listing for heart/kidney transplant. Need SW consult and GI consult as outlined.  LVAD backup option for MVR and AVR no longer being considered.    # Ascending aortic aneurysm:  4.5 x 4.5 cm proximal ascending aortic aneurysm seen on MRI done 2/14. Recent echo done 2/2/18 showed size of 4.2 cm. Continue to monitor with serial echoes.      FEN: 2 gm sodium diet  PROPHY:  Ambulation  LINES:  PIV  DISPO: Telemetry bed for inotropes as patient awaits transplant  CODE STATUS:  Full  ================================================================    Interval History/ROS:  No acute event overnight.  Doing well.     ROS:  4 point ROS including Respiratory, CV, GI and , other than that noted in the HPI, is negative.     Medications: Reviewed in EPIC.     Physical Exam:   /70  Pulse 108  Temp 98.8  F (37.1  C) (Oral)  Resp 16  Ht 1.727 m (5' 8\")  Wt 78.7 kg (173 lb 9.6 oz)  SpO2 98%  BMI 26.4 kg/m2  GENERAL: NAD, seen at HD  CV: Tachycardic  RESPIRATORY: Comfortable, on RA  GI:   EXTREMITIES: No peripheral edema.  NEUROLOGIC: Alert and orientated      Faculty Attestation  Rhett Austin M.D.    I personally saw and examined this patient, reviewed recent laboratories and imaging studies, discussed the case with the housestaff, and conveyed plan to the patient.  I answered all questions from patient and/or family. I agree with the examination, assessment and plan outlined here.  Pancreatic examination on Monday...    "

## 2018-05-05 NOTE — PLAN OF CARE
Problem: Cardiac: Heart Failure (Adult)  Goal: Signs and Symptoms of Listed Potential Problems Will be Absent, Minimized or Managed (Cardiac: Heart Failure)  Signs and symptoms of listed potential problems will be absent, minimized or managed by discharge/transition of care (reference Cardiac: Heart Failure (Adult) CPG).   Neuro: A&Ox4.   Cardiac: ST. VSS.   Respiratory: Sating 100% on RA. Infrequent dry cough  GI/: BM x1- pale pink/brown in color   Diet/appetite: Tolerating Renal diet. Eating well.  Activity:  Independent, up to chair and in halls.  Pain: Denies.   Skin: intact  LDA's: PICC infusing Dobutamine. Dialysis catheter intact.   Test: Dialysis done today.   Plan: Continue with POC. Notify primary team with changes.

## 2018-05-05 NOTE — PROGRESS NOTES
Nephrology dialysis note    This patient was seen and examined while in dialysis. Laboratory results and nurses' notes were reviewed. Doing well. Plan for 1.7L UF.    No changes to management of volume, anemia, BMD, acidosis, or electrolytes.    Diagnosis - ESRD undergoing evaluation for heart/kidney transplant on dobutamine    Brice Caraballo MD  [phone]

## 2018-05-05 NOTE — PROGRESS NOTES
HEMODIALYSIS TREATMENT NOTE    Date: 5/5/2018  Time: 4:48 PM    Data:  Pre Wt: 78.7 kg (173 lb 8 oz)   Desired Wt: 77 kg   Post Wt: 77 kg (169 lb 12.1 oz)  Weight gain: -1.7 kg   Weight change: 1.7 kg  Ultrafiltration - Post Run Net Total Removed (mL): 1700 mL  Ultrafiltration - Post Run Net Total Gain (mL): 0 mL  Vascular Access Status: Yes, secured and visible  Dialyzer Rinse: Streaked, Light  Total Blood Volume Processed: 63.6 L  Total Dialysis (Treatment) Time:  3.5 hrs    Lab:   YES    Assessment:  Patent Cath. Dobutamin IV drip pump at 2.9 ml/min    Interventions:  Reversed line d/t high AP.  Not to reach order BFR .Started Heparin minimal dose 500 units.hr per MD T/O.  Gave insulin 3 units sc on Lt arm before meal. BS:192. Stable and tolerable for 1.7 kg off /3.5 hrs run. Tx finished with rinse back. Gave Hectorol 0.5mcg IV.(See MAR)  CVC locked with 1: 1000 units Heparin.(Vol. Specific). Give report to 6C charge RN.     Plan:    Next run per renal team.

## 2018-05-06 DIAGNOSIS — E78.5 HYPERLIPIDEMIA LDL GOAL <100: ICD-10-CM

## 2018-05-06 DIAGNOSIS — N18.30 CKD (CHRONIC KIDNEY DISEASE) STAGE 3, GFR 30-59 ML/MIN (H): ICD-10-CM

## 2018-05-06 LAB
DEPRECATED CALCIDIOL+CALCIFEROL SERPL-MC: 27 UG/L (ref 20–75)
GLUCOSE BLDC GLUCOMTR-MCNC: 121 MG/DL (ref 70–99)
GLUCOSE BLDC GLUCOMTR-MCNC: 133 MG/DL (ref 70–99)
GLUCOSE BLDC GLUCOMTR-MCNC: 140 MG/DL (ref 70–99)
GLUCOSE BLDC GLUCOMTR-MCNC: 165 MG/DL (ref 70–99)
GLUCOSE BLDC GLUCOMTR-MCNC: 228 MG/DL (ref 70–99)

## 2018-05-06 PROCEDURE — 25000132 ZZH RX MED GY IP 250 OP 250 PS 637: Performed by: NURSE PRACTITIONER

## 2018-05-06 PROCEDURE — 25000132 ZZH RX MED GY IP 250 OP 250 PS 637: Performed by: STUDENT IN AN ORGANIZED HEALTH CARE EDUCATION/TRAINING PROGRAM

## 2018-05-06 PROCEDURE — 40000802 ZZH SITE CHECK

## 2018-05-06 PROCEDURE — 94640 AIRWAY INHALATION TREATMENT: CPT | Performed by: OPTOMETRIST

## 2018-05-06 PROCEDURE — 00000146 ZZHCL STATISTIC GLUCOSE BY METER IP

## 2018-05-06 PROCEDURE — 99232 SBSQ HOSP IP/OBS MODERATE 35: CPT | Mod: GC | Performed by: INTERNAL MEDICINE

## 2018-05-06 PROCEDURE — A9270 NON-COVERED ITEM OR SERVICE: HCPCS | Mod: GY | Performed by: INTERNAL MEDICINE

## 2018-05-06 PROCEDURE — 40000275 ZZH STATISTIC RCP TIME EA 10 MIN: Performed by: OPTOMETRIST

## 2018-05-06 PROCEDURE — 25000132 ZZH RX MED GY IP 250 OP 250 PS 637: Mod: GY | Performed by: INTERNAL MEDICINE

## 2018-05-06 PROCEDURE — 21400006 ZZH R&B CCU INTERMEDIATE UMMC

## 2018-05-06 PROCEDURE — 25000125 ZZHC RX 250: Performed by: STUDENT IN AN ORGANIZED HEALTH CARE EDUCATION/TRAINING PROGRAM

## 2018-05-06 PROCEDURE — A9270 NON-COVERED ITEM OR SERVICE: HCPCS | Mod: GY | Performed by: STUDENT IN AN ORGANIZED HEALTH CARE EDUCATION/TRAINING PROGRAM

## 2018-05-06 RX ADMIN — SENNOSIDES AND DOCUSATE SODIUM 1 TABLET: 8.6; 5 TABLET ORAL at 08:05

## 2018-05-06 RX ADMIN — ALBUTEROL SULFATE 2.5 MG: 2.5 SOLUTION RESPIRATORY (INHALATION) at 21:46

## 2018-05-06 RX ADMIN — VITAMIN B12 0.1 MG ORAL TABLET 100 MCG: 0.1 TABLET ORAL at 08:05

## 2018-05-06 RX ADMIN — INSULIN ASPART 2 UNITS: 100 INJECTION, SOLUTION INTRAVENOUS; SUBCUTANEOUS at 12:52

## 2018-05-06 RX ADMIN — SENNOSIDES AND DOCUSATE SODIUM 1 TABLET: 8.6; 5 TABLET ORAL at 19:45

## 2018-05-06 RX ADMIN — CALCIUM ACETATE 667 MG: 667 CAPSULE ORAL at 08:05

## 2018-05-06 RX ADMIN — ALLOPURINOL 100 MG: 100 TABLET ORAL at 08:05

## 2018-05-06 RX ADMIN — Medication 1 CAPSULE: at 08:06

## 2018-05-06 RX ADMIN — ASPIRIN 81 MG CHEWABLE TABLET 81 MG: 81 TABLET CHEWABLE at 19:44

## 2018-05-06 RX ADMIN — ATORVASTATIN CALCIUM 40 MG: 40 TABLET, FILM COATED ORAL at 19:44

## 2018-05-06 RX ADMIN — CALCIUM ACETATE 667 MG: 667 CAPSULE ORAL at 17:44

## 2018-05-06 RX ADMIN — OMEPRAZOLE 20 MG: 20 CAPSULE, DELAYED RELEASE ORAL at 19:44

## 2018-05-06 RX ADMIN — INSULIN ASPART 1 UNITS: 100 INJECTION, SOLUTION INTRAVENOUS; SUBCUTANEOUS at 18:07

## 2018-05-06 RX ADMIN — FLUTICASONE PROPIONATE 2 SPRAY: 50 SPRAY, METERED NASAL at 19:44

## 2018-05-06 RX ADMIN — CALCIUM ACETATE 667 MG: 667 CAPSULE ORAL at 12:36

## 2018-05-06 NOTE — PLAN OF CARE
Problem: Patient Care Overview  Goal: Plan of Care/Patient Progress Review  Pt HX of ICM and undergoing heart/kidney transplant workup.    Up ad edith, walking around unit independently. Room air, denies SOB. HR -120's. Dobutamine gtt 2.5mcg/kg/min. Anuric, HD T/Th/Sat. Regular diet, good appetite. NPO at midnight for procedure tmrw. 2L fluid restriction.     Continue transplant workup. EUS FNA scheduled for tomorrow (Monday 5/7). NPO at midnight. Notify CARDS 2 with any changes.

## 2018-05-06 NOTE — PLAN OF CARE
Problem: Patient Care Overview  Goal: Plan of Care/Patient Progress Review  Outcome: No Change    D: Pt with hx ICM undergoing heart/kidney transplant w/u.   I/A: TMax 99.4. 's-130's with BBB. Dobutamine gtt at 2.5 mcg/kg/min.  Poor appetite- endorsed mild nausea. Compazine given with state relief. BG monitored. SSI given at HS per orders. Pt independent with cares. Aneuric (states he voids dribbles with BM's). HD T,TH,Sat.  P: Continue transplant w/u (test scheduled for Monday). Monitor and assess pt condition and contact treatment team with questions or concerns.

## 2018-05-07 ENCOUNTER — ANESTHESIA (OUTPATIENT)
Dept: SURGERY | Facility: CLINIC | Age: 63
End: 2018-05-07

## 2018-05-07 ENCOUNTER — ANESTHESIA EVENT (OUTPATIENT)
Dept: SURGERY | Facility: CLINIC | Age: 63
End: 2018-05-07

## 2018-05-07 ENCOUNTER — COMMITTEE REVIEW (OUTPATIENT)
Dept: TRANSPLANT | Facility: CLINIC | Age: 63
End: 2018-05-07

## 2018-05-07 LAB
AMYLASE FLD-CCNC: NORMAL U/L
ANION GAP SERPL CALCULATED.3IONS-SCNC: 8 MMOL/L (ref 3–14)
BUN SERPL-MCNC: 21 MG/DL (ref 7–30)
C PEPTIDE SERPL-MCNC: 11.8 NG/ML (ref 0.9–6.9)
CALCIUM SERPL-MCNC: 9.1 MG/DL (ref 8.5–10.1)
CARDIOLIPIN ANTIBODY IGG: <1.6 GPL-U/ML (ref 0–19.9)
CARDIOLIPIN ANTIBODY IGM: 0.5 MPL-U/ML (ref 0–19.9)
CEA FLD-MCNC: 184.1 UG/L
CEA FLD-MCNC: NORMAL NG/ML
CHLORIDE SERPL-SCNC: 97 MMOL/L (ref 94–109)
CO2 SERPL-SCNC: 30 MMOL/L (ref 20–32)
CREAT SERPL-MCNC: 7.51 MG/DL (ref 0.66–1.25)
ERYTHROCYTE [DISTWIDTH] IN BLOOD BY AUTOMATED COUNT: 15.4 % (ref 10–15)
GFR SERPL CREATININE-BSD FRML MDRD: 7 ML/MIN/1.7M2
GLUCOSE BLDC GLUCOMTR-MCNC: 121 MG/DL (ref 70–99)
GLUCOSE BLDC GLUCOMTR-MCNC: 133 MG/DL (ref 70–99)
GLUCOSE BLDC GLUCOMTR-MCNC: 173 MG/DL (ref 70–99)
GLUCOSE BLDC GLUCOMTR-MCNC: 276 MG/DL (ref 70–99)
GLUCOSE SERPL-MCNC: 123 MG/DL (ref 70–99)
HCT VFR BLD AUTO: 30.4 % (ref 40–53)
HGB BLD-MCNC: 9.5 G/DL (ref 13.3–17.7)
INR PPP: 1.2 (ref 0.86–1.14)
MAGNESIUM SERPL-MCNC: 1.6 MG/DL (ref 1.6–2.3)
MCH RBC QN AUTO: 30.4 PG (ref 26.5–33)
MCHC RBC AUTO-ENTMCNC: 31.3 G/DL (ref 31.5–36.5)
MCV RBC AUTO: 97 FL (ref 78–100)
PLATELET # BLD AUTO: 222 10E9/L (ref 150–450)
POTASSIUM SERPL-SCNC: 4 MMOL/L (ref 3.4–5.3)
RBC # BLD AUTO: 3.12 10E12/L (ref 4.4–5.9)
SODIUM SERPL-SCNC: 135 MMOL/L (ref 133–144)
UPPER EUS: NORMAL
WBC # BLD AUTO: 4.9 10E9/L (ref 4–11)

## 2018-05-07 PROCEDURE — 40000275 ZZH STATISTIC RCP TIME EA 10 MIN

## 2018-05-07 PROCEDURE — 25000132 ZZH RX MED GY IP 250 OP 250 PS 637: Performed by: STUDENT IN AN ORGANIZED HEALTH CARE EDUCATION/TRAINING PROGRAM

## 2018-05-07 PROCEDURE — A9270 NON-COVERED ITEM OR SERVICE: HCPCS | Mod: GY | Performed by: NURSE PRACTITIONER

## 2018-05-07 PROCEDURE — 83735 ASSAY OF MAGNESIUM: CPT | Performed by: INTERNAL MEDICINE

## 2018-05-07 PROCEDURE — 85610 PROTHROMBIN TIME: CPT | Performed by: INTERNAL MEDICINE

## 2018-05-07 PROCEDURE — 0F9G8ZZ DRAINAGE OF PANCREAS, VIA NATURAL OR ARTIFICIAL OPENING ENDOSCOPIC: ICD-10-PCS | Performed by: INTERNAL MEDICINE

## 2018-05-07 PROCEDURE — 21400006 ZZH R&B CCU INTERMEDIATE UMMC

## 2018-05-07 PROCEDURE — 25000125 ZZHC RX 250: Performed by: NURSE PRACTITIONER

## 2018-05-07 PROCEDURE — 25000128 H RX IP 250 OP 636: Performed by: NURSE ANESTHETIST, CERTIFIED REGISTERED

## 2018-05-07 PROCEDURE — 27210995 ZZH RX 272: Performed by: INTERNAL MEDICINE

## 2018-05-07 PROCEDURE — 94640 AIRWAY INHALATION TREATMENT: CPT | Mod: 76

## 2018-05-07 PROCEDURE — 25000132 ZZH RX MED GY IP 250 OP 250 PS 637: Mod: GY | Performed by: INTERNAL MEDICINE

## 2018-05-07 PROCEDURE — 37000008 ZZH ANESTHESIA TECHNICAL FEE, 1ST 30 MIN: Performed by: INTERNAL MEDICINE

## 2018-05-07 PROCEDURE — 36000059 ZZH SURGERY LEVEL 3 EA 15 ADDTL MIN UMMC: Performed by: INTERNAL MEDICINE

## 2018-05-07 PROCEDURE — 25000132 ZZH RX MED GY IP 250 OP 250 PS 637: Performed by: NURSE PRACTITIONER

## 2018-05-07 PROCEDURE — 85027 COMPLETE CBC AUTOMATED: CPT | Performed by: INTERNAL MEDICINE

## 2018-05-07 PROCEDURE — 25000565 ZZH ISOFLURANE, EA 15 MIN: Performed by: INTERNAL MEDICINE

## 2018-05-07 PROCEDURE — 36000057 ZZH SURGERY LEVEL 3 1ST 30 MIN - UMMC: Performed by: INTERNAL MEDICINE

## 2018-05-07 PROCEDURE — A9270 NON-COVERED ITEM OR SERVICE: HCPCS | Performed by: INTERNAL MEDICINE

## 2018-05-07 PROCEDURE — 40000170 ZZH STATISTIC PRE-PROCEDURE ASSESSMENT II: Performed by: INTERNAL MEDICINE

## 2018-05-07 PROCEDURE — 80048 BASIC METABOLIC PNL TOTAL CA: CPT | Performed by: INTERNAL MEDICINE

## 2018-05-07 PROCEDURE — 82150 ASSAY OF AMYLASE: CPT | Performed by: INTERNAL MEDICINE

## 2018-05-07 PROCEDURE — 40000802 ZZH SITE CHECK

## 2018-05-07 PROCEDURE — A9270 NON-COVERED ITEM OR SERVICE: HCPCS | Mod: GY | Performed by: STUDENT IN AN ORGANIZED HEALTH CARE EDUCATION/TRAINING PROGRAM

## 2018-05-07 PROCEDURE — 37000009 ZZH ANESTHESIA TECHNICAL FEE, EACH ADDTL 15 MIN: Performed by: INTERNAL MEDICINE

## 2018-05-07 PROCEDURE — 25000132 ZZH RX MED GY IP 250 OP 250 PS 637: Mod: GY | Performed by: NURSE PRACTITIONER

## 2018-05-07 PROCEDURE — 25000128 H RX IP 250 OP 636: Performed by: INTERNAL MEDICINE

## 2018-05-07 PROCEDURE — 82378 CARCINOEMBRYONIC ANTIGEN: CPT | Performed by: INTERNAL MEDICINE

## 2018-05-07 PROCEDURE — 36415 COLL VENOUS BLD VENIPUNCTURE: CPT | Performed by: INTERNAL MEDICINE

## 2018-05-07 PROCEDURE — 27210794 ZZH OR GENERAL SUPPLY STERILE: Performed by: INTERNAL MEDICINE

## 2018-05-07 PROCEDURE — 25000125 ZZHC RX 250: Performed by: INTERNAL MEDICINE

## 2018-05-07 PROCEDURE — 88313 SPECIAL STAINS GROUP 2: CPT | Performed by: INTERNAL MEDICINE

## 2018-05-07 PROCEDURE — A9270 NON-COVERED ITEM OR SERVICE: HCPCS | Mod: GY | Performed by: INTERNAL MEDICINE

## 2018-05-07 PROCEDURE — C9399 UNCLASSIFIED DRUGS OR BIOLOG: HCPCS | Performed by: NURSE ANESTHETIST, CERTIFIED REGISTERED

## 2018-05-07 PROCEDURE — 99232 SBSQ HOSP IP/OBS MODERATE 35: CPT | Performed by: NURSE PRACTITIONER

## 2018-05-07 PROCEDURE — 71000012 ZZH RECOVERY PHASE 1 LEVEL 1 FIRST HR: Performed by: INTERNAL MEDICINE

## 2018-05-07 PROCEDURE — 00000146 ZZHCL STATISTIC GLUCOSE BY METER IP

## 2018-05-07 PROCEDURE — 88108 CYTOPATH CONCENTRATE TECH: CPT | Performed by: INTERNAL MEDICINE

## 2018-05-07 PROCEDURE — 25000125 ZZHC RX 250: Performed by: NURSE ANESTHETIST, CERTIFIED REGISTERED

## 2018-05-07 RX ORDER — LIDOCAINE 40 MG/G
CREAM TOPICAL
Status: DISCONTINUED | OUTPATIENT
Start: 2018-05-07 | End: 2018-05-07 | Stop reason: HOSPADM

## 2018-05-07 RX ORDER — NALOXONE HYDROCHLORIDE 0.4 MG/ML
.1-.4 INJECTION, SOLUTION INTRAMUSCULAR; INTRAVENOUS; SUBCUTANEOUS
Status: ACTIVE | OUTPATIENT
Start: 2018-05-07 | End: 2018-05-08

## 2018-05-07 RX ORDER — LIDOCAINE HYDROCHLORIDE 20 MG/ML
INJECTION, SOLUTION INFILTRATION; PERINEURAL PRN
Status: DISCONTINUED | OUTPATIENT
Start: 2018-05-07 | End: 2018-05-07

## 2018-05-07 RX ORDER — CETIRIZINE HYDROCHLORIDE 10 MG/1
10 TABLET ORAL DAILY
Status: DISCONTINUED | OUTPATIENT
Start: 2018-05-07 | End: 2018-06-15

## 2018-05-07 RX ORDER — ONDANSETRON 4 MG/1
4 TABLET, ORALLY DISINTEGRATING ORAL EVERY 30 MIN PRN
Status: DISCONTINUED | OUTPATIENT
Start: 2018-05-07 | End: 2018-05-07 | Stop reason: HOSPADM

## 2018-05-07 RX ORDER — ONDANSETRON 2 MG/ML
4 INJECTION INTRAMUSCULAR; INTRAVENOUS EVERY 30 MIN PRN
Status: DISCONTINUED | OUTPATIENT
Start: 2018-05-07 | End: 2018-05-07 | Stop reason: HOSPADM

## 2018-05-07 RX ORDER — FENTANYL CITRATE 50 UG/ML
INJECTION, SOLUTION INTRAMUSCULAR; INTRAVENOUS PRN
Status: DISCONTINUED | OUTPATIENT
Start: 2018-05-07 | End: 2018-05-07

## 2018-05-07 RX ORDER — ONDANSETRON 2 MG/ML
INJECTION INTRAMUSCULAR; INTRAVENOUS PRN
Status: DISCONTINUED | OUTPATIENT
Start: 2018-05-07 | End: 2018-05-07

## 2018-05-07 RX ORDER — CIPROFLOXACIN 2 MG/ML
INJECTION, SOLUTION INTRAVENOUS PRN
Status: DISCONTINUED | OUTPATIENT
Start: 2018-05-07 | End: 2018-05-07

## 2018-05-07 RX ORDER — FENTANYL CITRATE 50 UG/ML
25-50 INJECTION, SOLUTION INTRAMUSCULAR; INTRAVENOUS EVERY 5 MIN PRN
Status: DISCONTINUED | OUTPATIENT
Start: 2018-05-07 | End: 2018-05-07 | Stop reason: HOSPADM

## 2018-05-07 RX ORDER — SIMETHICONE 80 MG
80 TABLET,CHEWABLE ORAL EVERY 6 HOURS PRN
Status: DISCONTINUED | OUTPATIENT
Start: 2018-05-07 | End: 2018-06-15

## 2018-05-07 RX ORDER — SODIUM CHLORIDE, SODIUM LACTATE, POTASSIUM CHLORIDE, CALCIUM CHLORIDE 600; 310; 30; 20 MG/100ML; MG/100ML; MG/100ML; MG/100ML
INJECTION, SOLUTION INTRAVENOUS CONTINUOUS
Status: DISCONTINUED | OUTPATIENT
Start: 2018-05-07 | End: 2018-05-07 | Stop reason: HOSPADM

## 2018-05-07 RX ORDER — SODIUM CHLORIDE 9 MG/ML
INJECTION, SOLUTION INTRAVENOUS CONTINUOUS PRN
Status: DISCONTINUED | OUTPATIENT
Start: 2018-05-07 | End: 2018-05-07

## 2018-05-07 RX ORDER — PROPOFOL 10 MG/ML
INJECTION, EMULSION INTRAVENOUS PRN
Status: DISCONTINUED | OUTPATIENT
Start: 2018-05-07 | End: 2018-05-07

## 2018-05-07 RX ORDER — FLUMAZENIL 0.1 MG/ML
0.2 INJECTION, SOLUTION INTRAVENOUS
Status: ACTIVE | OUTPATIENT
Start: 2018-05-07 | End: 2018-05-08

## 2018-05-07 RX ADMIN — ROCURONIUM BROMIDE 50 MG: 10 INJECTION INTRAVENOUS at 14:29

## 2018-05-07 RX ADMIN — OMEPRAZOLE 20 MG: 20 CAPSULE, DELAYED RELEASE ORAL at 20:09

## 2018-05-07 RX ADMIN — ALTEPLASE 2 MG: 2.2 INJECTION, POWDER, LYOPHILIZED, FOR SOLUTION INTRAVENOUS at 22:21

## 2018-05-07 RX ADMIN — FLUTICASONE PROPIONATE 2 SPRAY: 50 SPRAY, METERED NASAL at 20:12

## 2018-05-07 RX ADMIN — MIDAZOLAM 1 MG: 1 INJECTION INTRAMUSCULAR; INTRAVENOUS at 14:33

## 2018-05-07 RX ADMIN — ALTEPLASE 2 MG: 2.2 INJECTION, POWDER, LYOPHILIZED, FOR SOLUTION INTRAVENOUS at 21:28

## 2018-05-07 RX ADMIN — SODIUM CHLORIDE: 9 INJECTION, SOLUTION INTRAVENOUS at 14:14

## 2018-05-07 RX ADMIN — CETIRIZINE HYDROCHLORIDE 10 MG: 10 TABLET, FILM COATED ORAL at 08:52

## 2018-05-07 RX ADMIN — SENNOSIDES AND DOCUSATE SODIUM 1 TABLET: 8.6; 5 TABLET ORAL at 08:52

## 2018-05-07 RX ADMIN — Medication 1 CAPSULE: at 08:52

## 2018-05-07 RX ADMIN — SIMETHICONE CHEW TAB 80 MG 80 MG: 80 TABLET ORAL at 08:52

## 2018-05-07 RX ADMIN — EPINEPHRINE 10 MCG: 1 INJECTION PARENTERAL at 15:08

## 2018-05-07 RX ADMIN — FENTANYL CITRATE 50 MCG: 50 INJECTION, SOLUTION INTRAMUSCULAR; INTRAVENOUS at 14:24

## 2018-05-07 RX ADMIN — CALCIUM ACETATE 667 MG: 667 CAPSULE ORAL at 17:09

## 2018-05-07 RX ADMIN — CIPROFLOXACIN 400 MG: 2 INJECTION INTRAVENOUS at 15:08

## 2018-05-07 RX ADMIN — ALBUTEROL SULFATE 2.5 MG: 2.5 SOLUTION RESPIRATORY (INHALATION) at 23:50

## 2018-05-07 RX ADMIN — Medication 2 G: at 20:05

## 2018-05-07 RX ADMIN — LIDOCAINE HYDROCHLORIDE 100 MG: 20 INJECTION, SOLUTION INFILTRATION; PERINEURAL at 14:29

## 2018-05-07 RX ADMIN — SENNOSIDES AND DOCUSATE SODIUM 1 TABLET: 8.6; 5 TABLET ORAL at 20:10

## 2018-05-07 RX ADMIN — VITAMIN B12 0.1 MG ORAL TABLET 100 MCG: 0.1 TABLET ORAL at 08:52

## 2018-05-07 RX ADMIN — SUGAMMADEX 200 MG: 100 INJECTION, SOLUTION INTRAVENOUS at 15:12

## 2018-05-07 RX ADMIN — PROPOFOL 40 MG: 10 INJECTION, EMULSION INTRAVENOUS at 14:30

## 2018-05-07 RX ADMIN — FENTANYL CITRATE 100 MCG: 50 INJECTION, SOLUTION INTRAMUSCULAR; INTRAVENOUS at 14:29

## 2018-05-07 RX ADMIN — EPINEPHRINE 10 MCG: 1 INJECTION PARENTERAL at 14:29

## 2018-05-07 RX ADMIN — DOBUTAMINE HYDROCHLORIDE 2.5 MCG/KG/MIN: 400 INJECTION INTRAVENOUS at 20:03

## 2018-05-07 RX ADMIN — ALLOPURINOL 100 MG: 100 TABLET ORAL at 08:52

## 2018-05-07 RX ADMIN — ONDANSETRON 4 MG: 2 INJECTION INTRAMUSCULAR; INTRAVENOUS at 15:15

## 2018-05-07 RX ADMIN — ATORVASTATIN CALCIUM 40 MG: 40 TABLET, FILM COATED ORAL at 20:09

## 2018-05-07 ASSESSMENT — PAIN DESCRIPTION - DESCRIPTORS: DESCRIPTORS: SORE

## 2018-05-07 NOTE — PROGRESS NOTES
[facility]   Cardiology II Service / Advanced Heart Failure  Daily Progress Note      Patient: Murray Nicholson  MRN: [id number]  Admission Date: 4/16/2018  Hospital Day # 21    Assessment and Plan: Murray is a 63 year old male with a PMH of CAD, ICM (EF of 15-20%), ESRD, bicuspid aortic valve, severe MR, and proximal AAA who was admitted for decompensated heart failure. He is currently being worked up for heart/kidney transplant and remains on dobutamine 2.5 mcg/kg/min.       Today's Plan:   - NPO for bx of IPMNs today in OR  - dermatology consult pending   - no changes to medical therapy today     # Chronic systolic heart failure secondary to ICM  # Severe mitral regurgitation  # Severe holodiastolic aortic insufficiency  Stage D  NYHA Class III    Fluid status: Euvolemic, on HD  Inotropy: continue dobutamine 2.5 mcg/kg/min  ACEi/ARB: contraindicated due to renal dysfunction  BB: contraindicated due to hypotension and is currently on inotropes  Aldosterone antagonist: contraindicated due to renal dysfunction  SCD prophylaxis: none, defer given potential for transplant listing  Sleep apnea: Yes, unable to tolerate CPAP  *Transplant workup pending: EUS today, derm consult pending, LVAD backup option for MVR and AVR no longer being considered.        # Intraductal papillary mucinous neoplasms:  4/16/18:  MR of abdomen showed:  Limited MR assessment due to exam interruption. Multiple cystic lesions in the pancreas, compatible with IPMNs. The largest of these measures 2.0 cm in maximum dimension. No main duct dilation or suspicious nodularity or septation.   -GI consulted. Appreciate recs.  -EUS with FNA today      # NSVT  Monitor on tele, likely exacerbated by dobutamine. Keep K>4 Mg>2. 10 beat run 5/7, asymptomatic    # Multiple nevi concerning for malignancy  Dermatology consulted while inpatient per renal request to eval for melanoma.     # Hx of allergic rhinitis  # Post nasal drip  Start certrizine 10 mg  "daily.     # Gas pains  Regular BMs per patient. Start simethicone prn today.     Chronic Medical Issues:  # ESRD:  Currently getting dialysis on T, Th, Sa   # DM II: Last Hemoglobin A1C 4/4/18  6.3.  Home meds: Glipizide 5 mg daily. Continue high intensity SS insulin. BGs 120-200 mostly.   # CAD:  Coronary angiogram 2/8/17 showed no obstructive CAD.  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  Continue ASA 81 mg daily and Atorvastatin 40 mg daily.   # Ascending aortic aneurysm:  4.5 x 4.5 cm proximal ascending aortic aneurysm seen on MRI 2/14. Recent echo 2/2/18 showed size of 4.2 cm. Continue to monitor with serial echoes.      FEN: 2 gm sodium diet, 2L FR  PROPHY: Ambulation  LINES: PICC  DISPO: TBD  CODE STATUS:  Full  ================================================================    Subjective/24-Hr Events:   Last 24 hr care team notes reviewed. Patient reports feeling fine. Reports some gas pains, did have recent BM. No constipation or diarrhea. Also reporting some rhinorrhea, tickle in back of throat, dry cough. No fever, chills. Does usually take Claritin in the spring. Has 10 beat run NSVT overnight, asymptomatic of this.    ROS:  4 point ROS including respiratory, CV, GI and  (other than that noted in the HPI) is negative.     Medications: Reviewed in EPIC.     Physical Exam:   /69 (BP Location: Right arm)  Pulse 107  Temp 98.8  F (37.1  C) (Oral)  Resp 20  Ht 1.727 m (5' 8\")  Wt 77.7 kg (171 lb 3.2 oz)  SpO2 98%  BMI 26.03 kg/m2    GENERAL: Appears comfortable, in no distress.  HEENT: Eye symmetrical, no discharge or icterus bilaterally. Mucous membranes moist and without lesions.  NECK: Supple, JVD just above clavicle at 90 degrees.   CV: Mildly tachycardic, +S1S2, soft murmur, rub, or gallop.   RESPIRATORY: Respirations regular, even, and unlabored. Lungs CTA throughout.   GI: Soft and mildly distended with normoactive bowel sounds present in all quadrants. No " tenderness, rebound, guarding.   EXTREMITIES: Trace peripheral edema. 2+ bilateral pedal pulses. Warm.   NEUROLOGIC: Alert and oriented x 3. No focal deficits.   MUSCULOSKELETAL: No joint swelling or tenderness.   SKIN: No jaundice. No rashes or lesions.     Labs:  CMP    Recent Labs  Lab 05/04/18  0645 05/03/18  0530 05/02/18  0605 05/01/18  0930    138 134 133   POTASSIUM 4.2 4.5 4.6 5.0   CHLORIDE 95 96 94 94   CO2 31 32 31 31   ANIONGAP 10 9 8 8   * 109* 66* 100*   BUN 12 21 18 36*   CR 5.86* 7.36* 5.91* 8.51*   GFRESTIMATED 10* 8* 10* 6*   GFRESTBLACK 12* 9* 12* 8*   TRINI 8.9 9.6 9.4 9.7   MAG  --  2.4* 1.9 2.4*   PHOS  --   --   --  3.2       CBC    Recent Labs  Lab 05/07/18  0649 05/03/18  0530 05/02/18  0605 05/01/18  0930   WBC 4.9 5.5 3.5* 4.6   RBC 3.12* 3.40* 3.39* 3.24*   HGB 9.5* 10.4* 10.3* 10.0*   HCT 30.4* 33.4* 32.9* 31.5*   MCV 97 98 97 97   MCH 30.4 30.6 30.4 30.9   MCHC 31.3* 31.1* 31.3* 31.7   RDW 15.4* 15.2* 15.3* 15.3*    226 222 185       INR    Recent Labs  Lab 05/07/18  0649   INR 1.20*       Time/Communication  I personally spent a total of 25 minutes. Of that 15 minutes was counseling/coordination of patient's care. Plan of care discussed with patient. See my note above for details.    Patient discussed with Dr. Austin.      Ramya Suh, FRANK, NP-C  Advanced Heart Failure/Cardiology II Service  Pager [phone]

## 2018-05-07 NOTE — PROGRESS NOTES
D: Awaiting Heart Kidney Transplant    I: Monitored vitals and assessed pt status.   Changed:-  Running: Dobutamine @ 2.5  PRN:-    A: A0x4. VSS. Afebrile. Urinating adequately. EUS indicates pancreatic cysts. Calm and cooperative.     P: Continue to monitor Pt status and report changes to treatment team.    Temp:  [98.4  F (36.9  C)-98.8  F (37.1  C)] 98.4  F (36.9  C)  Heart Rate:  [] 105  Resp:  [14-24] 20  BP: (102-139)/(64-83) 102/64  SpO2:  [96 %-100 %] 96 %

## 2018-05-07 NOTE — ANESTHESIA PREPROCEDURE EVALUATION
Anesthesia Evaluation     .             ROS/MED HX    ENT/Pulmonary:       Neurologic:       Cardiovascular:     (+) hypertension----. : . CHF etiology: 2/2 AI  Last EF: 15-20 date: 3/15/18 . . :. valvular problems/murmurs type: AI and MR bicuspid aortic valve:. Previous cardiac testing Echodate:3/15/18results:Interpretation Summary  Evaluation performed under optimal hemodynamic condition, post HD, and with BP  98/68.  Severely (EF <30%) reduced left ventricular function is present. The Ejection  Fraction is estimated at 15-20%.  Global right ventricular function is mildly to moderately reduced.  Moderate under hypovolemic condition and likely severe under usual loading  conditon mitral regurgitation. A single, focused regurgitant jet appears to  originate mostly from the A3 and P3 scallops. Mixed etiology mitral  regurgitation (LV dilation with restriction of the posterior leaflet  (Alex IIIB)). A secondary chordal structure appears disrupted in the A2  and P2 scallop.  Functionally bicuspid aortic valve with fusion of the left and right cusps  (Alex type III.) Severe holodiastolic aortic insufficiency is present.  The size of the annulus measures 26.8 mm by 2D and 31.5 mm by 3D. The max and  min diameter is 33 and 29.7 mm, respectively. The circumference is 99 mm and  the area is 7.7 cm2. There is little to no calcification in the area of the  annulus and in the LVOT.  Consider TAVR evaluation and mitral valve edge to edge repair.date: results: date: results: date: results:          METS/Exercise Tolerance:     Hematologic:  - neg hematologic  ROS       Musculoskeletal:         GI/Hepatic: Comment: Pancreatic cysts likely intraductal papillary mucinous neoplasm    (+) GERD       Renal/Genitourinary:     (+) chronic renal disease, type: ESRD, Pt requires dialysis, type: Peritoneal dialysis, Pt has no history of transplant,       Endo:     (+) type II DM .      Psychiatric:         Infectious Disease:  Comment: Peritonitis s/p removal of PD catheter        Malignancy:         Other:                     Physical Exam  Normal systems: pulmonary and dental    Airway   Mallampati: II  TM distance: >3 FB  Neck ROM: full    Dental     Cardiovascular   Rhythm and rate: regular and normal      Pulmonary                     Anesthesia Plan      History & Physical Review  History and physical reviewed and following examination; no interval change.    ASA Status:  3 .        Plan for General with Intravenous induction. Maintenance will be Inhalation.    PONV prophylaxis:  Ondansetron (or other 5HT-3)       Postoperative Care  Postoperative pain management:  IV analgesics.      Consents  Anesthetic plan, risks, benefits and alternatives discussed with:  Patient..

## 2018-05-07 NOTE — OP NOTE
Upper EUS 05/07/2018  2:18 PM [facility], [address] [place] [address] [phone]     Endoscopy Department   _______________________________________________________________________________   Patient Name: Murray Nicholson            Procedure Date: 5/7/2018 2:18 PM   MRN: [id number]                       Account Number: [id number]   YOB: 1955               Admit Type: Inpatient   Age: 63                               Room: OR   Gender: Male                          Note Status: Finalized   Attending MD: Alon Don MD   Total Sedation Time:   _______________________________________________________________________________       Procedure:           Upper EUS   Indications:         Pancreatic cyst on CT scan   Providers:           Alon Don MD   Patient Profile:     Mr Nicholson is a 64yo gentleman with severe cardiac                        disease and active heart failure under consideration for                        transplant who was found to have cystic lesions of the                        pancreas. Per request of his cardiology team he proceeds                        to further evaluation by EUS.   Referring MD:        Yahir Turcios MD   Medicines:           General Anesthesia, Ciprofloxacin 400 mg IV   Complications:       No immediate complications.   _______________________________________________________________________________   Procedure:           Pre-Anesthesia Assessment:                        - Prior to the procedure, a History and Physical was                        performed, and patient medications and allergies were                        reviewed. The patient is competent. The risks and                        benefits of the procedure and the sedation options and                        risks were discussed with the patient. All questions                        were answered and informed consent was obtained. Patient                         identification and proposed procedure were verified by                        the nurse in the pre-procedure area. Mental Status                        Examination: alert and oriented. Airway Examination:                        Mallampati Class II (the uvula but not tonsillar pillars                        visualized). Respiratory Examination: bibasilar                        crackles. CV Examination: systolic murmur. ASA Grade                        Assessment: III - A patient with severe systemic                        disease. After reviewing the risks and benefits, the                        patient was deemed in satisfactory condition to undergo                        the procedure. The anesthesia plan was to use deep                        sedation / analgesia. Immediately prior to                        administration of medications, the patient was                        re-assessed for adequacy to receive sedatives. The heart                        rate, respiratory rate, oxygen saturations, blood                        pressure, adequacy of pulmonary ventilation, and                        response to care were monitored throughout the                        procedure. The physical status of the patient was                        re-assessed after the procedure. After obtaining                        informed consent, the endoscope was passed under direct                        vision. Throughout the procedure, the patient's blood                        pressure, pulse, and oxygen saturations were monitored                        continuously. The echoendoscope was introduced through                        the mouth, and advanced to the second part of duodenum.                        The upper EUS was accomplished without difficulty. The                        patient tolerated the procedure well.                                                                                     Findings:         White light and endosonographic findings :        A curved linear echoendoscope was utilized throughout. Limited white        light views of the foregut were grossly unremarkable save for mild        erythema of the antrum. The ampulla was tangentially visualized and        without over mass. In terms of endosonography, multiple simple cystic        lesions were found in the head (largest 12.0x8.5mm), body (largest        11.7x9.3mm) and tail (largest 3mm). None of these demonstrated        concerning features such as involving the main duct, mural nodule, wall        thickening or associations with solid lesions. Clear side branch        communication was not found. The remainder of the pancreatic parenchyma        was without solid or other concenring lesion. The main pancreatic duct        was traced from tail to head and found to be traditional in course        without dilation measuring 1.2mm in the head with smooth taper to less        than 1mm in the tail. There were no overtly dilated side branches. The        duct wall was hyperechoic in multiple locations and there was no        evidence of internal solid structure. The gallbladder is in situ without        wall thickening or internal solid component. The common duct was traced        from bifurcation to ampulla and found to be without dilation (measuring        up to just above 3mm), wall thickening or internal solid component.        There were several benign appearing nodes, triangular to oval in shape        and measuring near 10mm in the periportal and peripancreatic regions.        The major vascular structures of the abdomen including the splenics,        superior mesenterics, portal and celiac were unremarkable. Limited views        of the left lobe, right lobe, caudate lobe, left kidney and spleen were        unremarkable. Diagnostic needle aspiration for fluid was performed.        Color Doppler imaging was utilized prior to needle puncture to  confirm a        lack of significant vascular structures within the needle path. One pass        was made with the 22 gauge needle using a transduodenal approach. No        stylet was used. The amount of fluid collected was 1 mL. The fluid was        clear and thin. Sample(s) were sent for amylase concentration and CEA.                                                                                     Impression:          - Multiple simple cystic lesions were found in the head                        (largest 12mm), body (largest 12mm) and tail (largest                        3mm). None of these demonstrated concerning features                        such as involving the main duct, mural nodule, wall                        thickening or associations with solid lesions. Given the                        multuplicity, we would have favored intraductal mucinous                        neoplasms, however sampling of the largest in the head                        demonstrated thin clear fluid suggestive of pseudocyst.                        Studies were requested to help discern the etiology. No                        other pancreatic lesions were demonstrated and the main                        duct was unremarkable as was the gallbladder and biliary                        system. There were several benign appearing nodes,                        triangular to oval in shape and measuring near 10mm in                        the periportal and peripancreatic regions. The major                        vascular structures of the abdomen were unremarkable.   Recommendation:      - Standard inpatient general anesthesia recovery with                        return to the floor when appropriate                        - Complete a short course (3 day) of ciprofloxacin                        (first dose given)                        - Avoid antithrombotics for at least three days                        - If fluid returns consistent  with IPMN then                        surveillance by noninvasive imaging (MRI in 1 year)                        until imaging has concerning finding; if fluid returns                        consistent with pseudocyst then no further surveillance                        required                        - The findings and recommendations were discussed with                        the patient                                                                                       electronically signed by NATALIE Don

## 2018-05-07 NOTE — ANESTHESIA POSTPROCEDURE EVALUATION
Patient: Murray Nicholson    Procedure(s):  Endoscopic Ultrasound with Fine Needle Aspiration  - Wound Class: II-Clean Contaminated    Diagnosis:Intraductal papillary mucinous neoplasm   Diagnosis Additional Information: No value filed.    Anesthesia Type:  General    Note:  Anesthesia Post Evaluation    Patient location during evaluation: PACU  Patient participation: Able to fully participate in evaluation  Level of consciousness: awake and alert  Pain management: adequate  Airway patency: patent  Cardiovascular status: blood pressure returned to baseline and hemodynamically stable  Respiratory status: nasal cannula  Hydration status: acceptable  PONV: none     Anesthetic complications: None          Last vitals:  Vitals:    05/07/18 0819 05/07/18 1523 05/07/18 1600   BP: 117/79 139/83 118/74   Pulse:      Resp: 20 14    Temp: 36.9  C (98.5  F) 36.9  C (98.4  F)    SpO2: 100% 100% 100%         Electronically Signed By: Diego Marino MD  May 7, 2018  4:13 PM

## 2018-05-07 NOTE — TELEPHONE ENCOUNTER
Creatinine   Date Value Ref Range Status   05/07/2018 7.51 (H) 0.66 - 1.25 mg/dL Final     Routing refill request to provider for review/approval because:  Labs out of range:  creatinine

## 2018-05-07 NOTE — PROGRESS NOTES
[facility]   Cardiology II Service / Advanced Heart Failure  Daily Progress Note  Date of Service: 5/4/2018      Patient: Murray Nicholson  MRN: [id number]  Admission Date: 4/16/2018  Hospital Day # 20    Assessment and Plan: Murray is a 63 year old male with a PMH of CAD, ICM (EF of 15-20%), ESRD, bicuspid aortic valve, severe MR, and proximal AAA who was admitted for decompensated HF.  He is currently being worked up for heart/kidney transplant and remains on dobutamine 2.5 mcg/kg/min.      Today's Plan:   - Continue inotrope    # Chronic systolic heart failure secondary to ICM.  Stage D  NYHA Class III:   ACEi/ARB: contraindicated due to renal dysfunction  BB: contraindicated due to hypotension and is currently on inotropes  Aldosterone antagonist: contraindicated due to renal dysfunction  SCD prophylaxis: None.  Deferred.   Fluid status: Euvolemic.  Anticoagulation: None.   Antiplatelet:  ASA dose 81 mg daily.   Sleep apnea: Yes, unable to tolerate CPAP  NSAID use:  Contraindicated.  Avoid use.  - Transplant workup pending: EUS on Monday and derm consult.      Intraductal papillary mucinous neoplasms:  4/16/18:  MR of abdomen showed:  Limited MR assessment due to exam interruption.  Multiple cystic lesions in the pancreas, compatible with IPMNs. The largest of these measures 2.0 cm in maximum dimension. No main duct dilation or suspicious nodularity or septation.   -GI consulted today.  Appreciate recs  -EUS with FNA scheduled for Monday am in OR.     Chronic Medical Issues:  # ESRD:  Currently getting dialysis on T, Th, Sa   # DM II:  Last Hemoglobin A1C:  4/4:  6.3.  Home meds: Glipizide 5 mg daily.   Continue high intensity SS insulin  # NSVT:  Telemetry, replace lytes   # CAD:  Coronary angiogram 2/8/17 showed no obstructive CAD.  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  Continue ASA 81 mg daily and Atorvastatin 40 mg daily.   # AS and MR: Moving towards listing for  "heart/kidney transplant. Need SW consult and GI consult as outlined.  LVAD backup option for MVR and AVR no longer being considered.    # Ascending aortic aneurysm:  4.5 x 4.5 cm proximal ascending aortic aneurysm seen on MRI done 2/14. Recent echo done 2/2/18 showed size of 4.2 cm. Continue to monitor with serial echoes.      FEN: 2 gm sodium diet  PROPHY:  Ambulation  LINES:  PIV  DISPO: Telemetry bed for inotropes as patient awaits transplant  CODE STATUS:  Full  ================================================================    Interval History/ROS:  No acute event overnight.  Doing well.     ROS:  4 point ROS including Respiratory, CV, GI and , other than that noted in the HPI, is negative.     Medications: Reviewed in EPIC.     Physical Exam:   /67 (BP Location: Right arm)  Pulse 107  Temp 98.6  F (37  C) (Oral)  Resp 20  Ht 1.727 m (5' 8\")  Wt 77.1 kg (170 lb)  SpO2 100%  BMI 25.85 kg/m2  GENERAL: NAD, seen at HD  CV: Tachycardic  RESPIRATORY: Comfortable, on RA  GI:   EXTREMITIES: No peripheral edema.  NEUROLOGIC: Alert and orientated      Faculty Attestation  Rhett Austin M.D.    I personally saw and examined this patient, reviewed recent laboratories and imaging studies, discussed the case with the housestaff, and conveyed plan to the patient.  I answered all questions from patient and/or family. I agree with the examination, assessment and plan outlined here.      :  "

## 2018-05-07 NOTE — PLAN OF CARE
Problem: Patient Care Overview  Goal: Plan of Care/Patient Progress Review    6C: Cxl - Pt in OR for procedure.

## 2018-05-07 NOTE — ANESTHESIA CARE TRANSFER NOTE
Patient: Murray Nicholson    Procedure(s):  Endoscopic Ultrasound with Fine Needle Aspiration  - Wound Class: II-Clean Contaminated    Diagnosis: Intraductal papillary mucinous neoplasm   Diagnosis Additional Information: No value filed.    Anesthesia Type:   General     Note:  Airway :Nasal Cannula  Patient transferred to:PACU  Handoff Report: Identifed the Patient, Identified the Reponsible Provider, Reviewed the pertinent medical history, Discussed the surgical course, Reviewed Intra-OP anesthesia mangement and issues during anesthesia, Set expectations for post-procedure period and Allowed opportunity for questions and acknowledgement of understanding      Vitals: (Last set prior to Anesthesia Care Transfer)    CRNA VITALS  5/7/2018 1450 - 5/7/2018 1531      5/7/2018             Pulse: 125    Ht Rate: 124    SpO2: 100 %                Electronically Signed By: VERONICA Alexander CRNA  May 7, 2018  3:31 PM

## 2018-05-07 NOTE — TELEPHONE ENCOUNTER
"Requested Prescriptions   Pending Prescriptions Disp Refills     atorvastatin (LIPITOR) 40 MG tablet [Pharmacy Med Name: ATORVASTATIN 40MG TABLETS]      Last Written Prescription Date:  4/28/2017  Last Fill Quantity: 90 tablets     ,  # refills: 3   Last Office Visit: 2/23/2018   Future Office Visit:      90 tablet 0     Sig: TAKE 1 TABLET(40 MG) BY MOUTH DAILY    Statins Protocol Passed    5/6/2018  3:11 AM       Passed - LDL on file in past 12 months    Recent Labs   Lab Test  04/16/18   1546   LDL  60          Passed - No abnormal creatine kinase in past 12 months    Recent Labs   Lab Test  04/12/17   0935   CKT  70           Passed - Recent (12 mo) or future (30 days) visit within the authorizing provider's specialty    Patient had office visit in the last 12 months or has a visit in the next 30 days with authorizing provider or within the authorizing provider's specialty.  See \"Patient Info\" tab in inbasket, or \"Choose Columns\" in Meds & Orders section of the refill encounter.           Passed - Patient is age 18 or older          "

## 2018-05-08 ENCOUNTER — CARE COORDINATION (OUTPATIENT)
Dept: TRANSPLANT | Facility: CLINIC | Age: 63
End: 2018-05-08

## 2018-05-08 LAB
ANION GAP SERPL CALCULATED.3IONS-SCNC: 8 MMOL/L (ref 3–14)
B2 GLYCOPROT1 IGG SERPL IA-ACNC: 2 U/ML
B2 GLYCOPROT1 IGM SERPL IA-ACNC: <0.9 U/ML
BUN SERPL-MCNC: 25 MG/DL (ref 7–30)
CALCIUM SERPL-MCNC: 9 MG/DL (ref 8.5–10.1)
CHLORIDE SERPL-SCNC: 97 MMOL/L (ref 94–109)
CO2 SERPL-SCNC: 31 MMOL/L (ref 20–32)
CREAT SERPL-MCNC: 8.99 MG/DL (ref 0.66–1.25)
ERYTHROCYTE [DISTWIDTH] IN BLOOD BY AUTOMATED COUNT: 15.5 % (ref 10–15)
FERRITIN SERPL-MCNC: 621 NG/ML (ref 26–388)
GFR SERPL CREATININE-BSD FRML MDRD: 6 ML/MIN/1.7M2
GLUCOSE BLDC GLUCOMTR-MCNC: 118 MG/DL (ref 70–99)
GLUCOSE BLDC GLUCOMTR-MCNC: 147 MG/DL (ref 70–99)
GLUCOSE BLDC GLUCOMTR-MCNC: 178 MG/DL (ref 70–99)
GLUCOSE BLDC GLUCOMTR-MCNC: 190 MG/DL (ref 70–99)
GLUCOSE BLDC GLUCOMTR-MCNC: 227 MG/DL (ref 70–99)
GLUCOSE SERPL-MCNC: 127 MG/DL (ref 70–99)
HCT VFR BLD AUTO: 28.3 % (ref 40–53)
HGB BLD-MCNC: 8.6 G/DL (ref 13.3–17.7)
IRON SATN MFR SERPL: 27 % (ref 15–46)
IRON SERPL-MCNC: 58 UG/DL (ref 35–180)
LA PPP-IMP: NEGATIVE
MAGNESIUM SERPL-MCNC: 2 MG/DL (ref 1.6–2.3)
MCH RBC QN AUTO: 29.7 PG (ref 26.5–33)
MCHC RBC AUTO-ENTMCNC: 30.4 G/DL (ref 31.5–36.5)
MCV RBC AUTO: 98 FL (ref 78–100)
PLATELET # BLD AUTO: 207 10E9/L (ref 150–450)
POTASSIUM SERPL-SCNC: 4.1 MMOL/L (ref 3.4–5.3)
RBC # BLD AUTO: 2.9 10E12/L (ref 4.4–5.9)
SODIUM SERPL-SCNC: 137 MMOL/L (ref 133–144)
TIBC SERPL-MCNC: 218 UG/DL (ref 240–430)
WBC # BLD AUTO: 4.4 10E9/L (ref 4–11)

## 2018-05-08 PROCEDURE — 90937 HEMODIALYSIS REPEATED EVAL: CPT

## 2018-05-08 PROCEDURE — 21400006 ZZH R&B CCU INTERMEDIATE UMMC

## 2018-05-08 PROCEDURE — 83550 IRON BINDING TEST: CPT | Performed by: PHYSICIAN ASSISTANT

## 2018-05-08 PROCEDURE — 25000132 ZZH RX MED GY IP 250 OP 250 PS 637: Performed by: STUDENT IN AN ORGANIZED HEALTH CARE EDUCATION/TRAINING PROGRAM

## 2018-05-08 PROCEDURE — 00000146 ZZHCL STATISTIC GLUCOSE BY METER IP

## 2018-05-08 PROCEDURE — 40000802 ZZH SITE CHECK

## 2018-05-08 PROCEDURE — A9270 NON-COVERED ITEM OR SERVICE: HCPCS | Mod: GY | Performed by: NURSE PRACTITIONER

## 2018-05-08 PROCEDURE — 25000128 H RX IP 250 OP 636: Performed by: INTERNAL MEDICINE

## 2018-05-08 PROCEDURE — 94640 AIRWAY INHALATION TREATMENT: CPT

## 2018-05-08 PROCEDURE — 25000125 ZZHC RX 250: Performed by: STUDENT IN AN ORGANIZED HEALTH CARE EDUCATION/TRAINING PROGRAM

## 2018-05-08 PROCEDURE — 83735 ASSAY OF MAGNESIUM: CPT | Performed by: INTERNAL MEDICINE

## 2018-05-08 PROCEDURE — 25000125 ZZHC RX 250: Performed by: NURSE PRACTITIONER

## 2018-05-08 PROCEDURE — A9270 NON-COVERED ITEM OR SERVICE: HCPCS | Mod: GY | Performed by: INTERNAL MEDICINE

## 2018-05-08 PROCEDURE — 25000132 ZZH RX MED GY IP 250 OP 250 PS 637: Performed by: NURSE PRACTITIONER

## 2018-05-08 PROCEDURE — 25000128 H RX IP 250 OP 636: Performed by: NURSE PRACTITIONER

## 2018-05-08 PROCEDURE — 85027 COMPLETE CBC AUTOMATED: CPT | Performed by: INTERNAL MEDICINE

## 2018-05-08 PROCEDURE — 82728 ASSAY OF FERRITIN: CPT | Performed by: PHYSICIAN ASSISTANT

## 2018-05-08 PROCEDURE — 25000132 ZZH RX MED GY IP 250 OP 250 PS 637: Performed by: INTERNAL MEDICINE

## 2018-05-08 PROCEDURE — 99232 SBSQ HOSP IP/OBS MODERATE 35: CPT | Performed by: NURSE PRACTITIONER

## 2018-05-08 PROCEDURE — 83540 ASSAY OF IRON: CPT | Performed by: PHYSICIAN ASSISTANT

## 2018-05-08 PROCEDURE — 25000132 ZZH RX MED GY IP 250 OP 250 PS 637: Mod: GY | Performed by: NURSE PRACTITIONER

## 2018-05-08 PROCEDURE — A9270 NON-COVERED ITEM OR SERVICE: HCPCS | Mod: GY | Performed by: STUDENT IN AN ORGANIZED HEALTH CARE EDUCATION/TRAINING PROGRAM

## 2018-05-08 PROCEDURE — 80048 BASIC METABOLIC PNL TOTAL CA: CPT | Performed by: INTERNAL MEDICINE

## 2018-05-08 RX ORDER — ATORVASTATIN CALCIUM 40 MG/1
TABLET, FILM COATED ORAL
Qty: 90 TABLET | Refills: 0 | Status: SHIPPED | OUTPATIENT
Start: 2018-05-08 | End: 2018-07-02

## 2018-05-08 RX ORDER — ASPIRIN 81 MG/1
81 TABLET, CHEWABLE ORAL DAILY
Status: DISCONTINUED | OUTPATIENT
Start: 2018-05-11 | End: 2018-06-14

## 2018-05-08 RX ORDER — NALOXONE HYDROCHLORIDE 0.4 MG/ML
.1-.4 INJECTION, SOLUTION INTRAMUSCULAR; INTRAVENOUS; SUBCUTANEOUS
Status: ACTIVE | OUTPATIENT
Start: 2018-05-08 | End: 2018-05-09

## 2018-05-08 RX ORDER — DOXYCYCLINE 100 MG/1
100 CAPSULE ORAL EVERY 12 HOURS SCHEDULED
Status: COMPLETED | OUTPATIENT
Start: 2018-05-08 | End: 2018-05-10

## 2018-05-08 RX ORDER — CEFTRIAXONE 1 G/1
1 INJECTION, POWDER, FOR SOLUTION INTRAMUSCULAR; INTRAVENOUS EVERY 24 HOURS
Status: COMPLETED | OUTPATIENT
Start: 2018-05-08 | End: 2018-05-10

## 2018-05-08 RX ORDER — HEPARIN SODIUM 1000 [USP'U]/ML
500 INJECTION, SOLUTION INTRAVENOUS; SUBCUTANEOUS CONTINUOUS
Status: DISCONTINUED | OUTPATIENT
Start: 2018-05-08 | End: 2018-05-17

## 2018-05-08 RX ADMIN — IRON SUCROSE 50 MG: 20 INJECTION, SOLUTION INTRAVENOUS at 09:13

## 2018-05-08 RX ADMIN — SIMETHICONE CHEW TAB 80 MG 80 MG: 80 TABLET ORAL at 07:38

## 2018-05-08 RX ADMIN — DOXYCYCLINE HYCLATE 100 MG: 100 CAPSULE ORAL at 12:20

## 2018-05-08 RX ADMIN — DOXYCYCLINE HYCLATE 100 MG: 100 CAPSULE ORAL at 19:54

## 2018-05-08 RX ADMIN — SENNOSIDES AND DOCUSATE SODIUM 1 TABLET: 8.6; 5 TABLET ORAL at 07:32

## 2018-05-08 RX ADMIN — HEPARIN SODIUM 500 UNITS/HR: 1000 INJECTION, SOLUTION INTRAVENOUS; SUBCUTANEOUS at 09:00

## 2018-05-08 RX ADMIN — ALLOPURINOL 100 MG: 100 TABLET ORAL at 07:32

## 2018-05-08 RX ADMIN — CALCIUM ACETATE 667 MG: 667 CAPSULE ORAL at 19:05

## 2018-05-08 RX ADMIN — CEFTRIAXONE 1 G: 1 INJECTION, POWDER, FOR SOLUTION INTRAMUSCULAR; INTRAVENOUS at 12:20

## 2018-05-08 RX ADMIN — HEPARIN SODIUM 2300 UNITS: 1000 INJECTION, SOLUTION INTRAVENOUS; SUBCUTANEOUS at 12:00

## 2018-05-08 RX ADMIN — SODIUM CHLORIDE 250 ML: 9 INJECTION, SOLUTION INTRAVENOUS at 08:20

## 2018-05-08 RX ADMIN — SODIUM CHLORIDE 300 ML: 9 INJECTION, SOLUTION INTRAVENOUS at 08:20

## 2018-05-08 RX ADMIN — Medication 1 CAPSULE: at 07:32

## 2018-05-08 RX ADMIN — VITAMIN B12 0.1 MG ORAL TABLET 100 MCG: 0.1 TABLET ORAL at 07:32

## 2018-05-08 RX ADMIN — ALBUTEROL SULFATE 2.5 MG: 2.5 SOLUTION RESPIRATORY (INHALATION) at 22:24

## 2018-05-08 RX ADMIN — Medication 2 G: at 06:47

## 2018-05-08 RX ADMIN — CETIRIZINE HYDROCHLORIDE 10 MG: 10 TABLET, FILM COATED ORAL at 07:32

## 2018-05-08 RX ADMIN — ATORVASTATIN CALCIUM 40 MG: 40 TABLET, FILM COATED ORAL at 19:53

## 2018-05-08 RX ADMIN — INSULIN ASPART 1 UNITS: 100 INJECTION, SOLUTION INTRAVENOUS; SUBCUTANEOUS at 19:05

## 2018-05-08 RX ADMIN — CALCIUM ACETATE 667 MG: 667 CAPSULE ORAL at 07:33

## 2018-05-08 RX ADMIN — OMEPRAZOLE 20 MG: 20 CAPSULE, DELAYED RELEASE ORAL at 19:53

## 2018-05-08 RX ADMIN — SENNOSIDES AND DOCUSATE SODIUM 1 TABLET: 8.6; 5 TABLET ORAL at 19:55

## 2018-05-08 RX ADMIN — TRAMADOL HYDROCHLORIDE 50 MG: 50 TABLET, COATED ORAL at 22:20

## 2018-05-08 RX ADMIN — FLUTICASONE PROPIONATE 2 SPRAY: 50 SPRAY, METERED NASAL at 19:54

## 2018-05-08 RX ADMIN — CALCIUM ACETATE 667 MG: 667 CAPSULE ORAL at 12:20

## 2018-05-08 RX ADMIN — INSULIN ASPART 3 UNITS: 100 INJECTION, SOLUTION INTRAVENOUS; SUBCUTANEOUS at 12:20

## 2018-05-08 ASSESSMENT — PAIN DESCRIPTION - DESCRIPTORS: DESCRIPTORS: SORE

## 2018-05-08 NOTE — PROGRESS NOTES
Nephrology Progress Note  05/08/2018         Assessment & Recommendations:   Murray Nicholson is a 63 yr old male with PMH of HTN, DMII, functionally bicuspid aortic valve, CHF/CM (EF 10-15%), ESRD, admitted with worsening dyspnea/SOB, now on dobutamine drip and being evaluated for heart/kidney transplant.      ESKD: due to DMII, on PD since Aug 2017, changed to iHD 1/2018 due to polymicrobial and fungal peritonitis, now on TTS schedule at [facility] under care of Dr Mcpherson. Access: tunneled RIJ (replaced 4/17/18). Run time: 4 hrs; EDW 77 kg.  - Dialysis per TTS schedule   - Using low dose heparin on HD  - Heparin lock CVC          Volume: 77 kg, RHC 5/1 (done at 77 kg): RA 3, PCW 15  - Daily standing weights please     Severe AV and MR insufficiency:   BP/congestive CM (EF 10-15%); minimal CAD per angio on 2/8/17. Chronically hypotensive with tachycardia  - Goal MAP > 65 and SBP > 95 while dialyzing  - On dobutamine 2.5 mcg/kg/min (started 4/20/18)  - Being evaluated for heart/kidney tx          Anemia: hgb 8.6, down-drifting; Recent labs with hgb in 11's, ferritin 371, IS 12, iron 36; on venofer 50 mg qTuesday, ANASTASIYA recently stopped.  - Continue venofer  - Rechecking iron labs  - Will restart epogen at 4000 units per HD      BMD: calcium 9.0, alb 3.5, phos 3.2; recent ; Vit D 27; on hectorol 0.5 mcg via HD; on phoslo 1 tab TID with meals.   - Continue hectorol via HD  - Continue Phoslo       Recommendations were communicated to primary team via this note.       [facility]  Division of Kidney Disease  [phone]    Interval History :   Seen on dialysis, UF to dry weight, stable run. Rechecking iron labs and will start ANASTASIYA. Denies CP, SOB, chills, n/v.    Review of Systems:   4 point ROS negative other than as noted above.    Physical Exam:   I/O last 3 completed shifts:  In: 611.98 [P.O.:240; I.V.:371.98]  Out: -    /64  Pulse 107  Temp 98.4  F (36.9  C) (Oral)  Resp 18  Ht 1.727 m  "(5' 8\")  Wt 78.8 kg (173 lb 12.8 oz)  SpO2 100%  BMI 26.43 kg/m2     GENERAL APPEARANCE: alert, NAD  EYES: no scleral icterus, pupils equal   Pulmonary: lungs clear to auscultation with equal breath sounds bilaterally   CV: regular rhythm, tachycardia at baseline   - Edema none  GI: soft, nontender, normal bowel sounds  MS: no evidence of inflammation in joints, no muscle tenderness  : no doyle  SKIN: no rash, warm, dry, no cyanosis  NEURO: face symmetric, no asterixis   Access: tunneled [facility]       Labs:   All labs reviewed by me  Electrolytes/Renal -   Recent Labs   Lab Test  05/08/18   0455  05/07/18   0649  05/04/18   0645  05/03/18   0530   05/01/18   0930   04/21/18   0640   04/16/18   1546   NA  137  135  136  138   < >  133   < >  135   < >  135   POTASSIUM  4.1  4.0  4.2  4.5   < >  5.0   < >  4.0   < >  5.0   CHLORIDE  97  97  95  96   < >  94   < >  96   < >  101   CO2  31  30  31  32   < >  31   < >  24   < >  18*   BUN  25  21  12  21   < >  36*   < >  41*   < >  63*   CR  8.99*  7.51*  5.86*  7.36*   < >  8.51*   < >  7.72*   < >  8.81*   GLC  127*  123*  104*  109*   < >  100*   < >  111*   < >  253*   TRINI  9.0  9.1  8.9  9.6   < >  9.7   < >  9.2   < >  9.6   MAG  2.0  1.6   --   2.4*   < >  2.4*   < >  1.6   < >  1.9   PHOS   --    --    --    --    --   3.2   --   5.4*   --   6.4*    < > = values in this interval not displayed.       CBC -   Recent Labs   Lab Test  05/08/18   0455  05/07/18   0649  05/03/18   0530   WBC  4.4  4.9  5.5   HGB  8.6*  9.5*  10.4*   PLT  207  222  226       LFTs -   Recent Labs   Lab Test  04/16/18   1546  03/07/18   0833  02/19/18   1558  02/02/18   1736   ALKPHOS  123  129  102  86   BILITOTAL  0.8  0.7  0.6  0.4   ALT  22  21  15  16   AST  17  18  18  20   PROTTOTAL  7.4   --   7.2  6.2*   ALBUMIN  3.5   --   2.7*  2.1*       Iron Panel -   Recent Labs   Lab Test  07/19/17   1306  07/05/17   1204  06/21/17   1058   IRON  46  26*  69   IRONSAT  18  12*  29   ALBERTINA  " 369  542*  557*         Imaging:  Reviewed    Current Medications:    albuterol  2.5 mg Nebulization At Bedtime     allopurinol  100 mg Oral Daily     aspirin  81 mg Oral Daily     atorvastatin  40 mg Oral Daily     calcium acetate  667 mg Oral TID w/meals     cetirizine  10 mg Oral Daily     cyanocolbalamin  100 mcg Oral Daily     fluticasone  1-2 spray Both Nostrils Daily     gelatin absorbable  1 each Topical During Hemodialysis (from stock)     heparin  3 mL Intracatheter During Hemodialysis (from stock)     heparin  3 mL Intracatheter During Hemodialysis (from stock)     insulin aspart  1-10 Units Subcutaneous TID AC     insulin aspart  1-7 Units Subcutaneous At Bedtime     NEPHROCAPS  1 capsule Oral Daily     - MEDICATION INSTRUCTIONS -   Does not apply Once     omeprazole  20 mg Oral Daily     polyethylene glycol  17 g Oral Daily     senna-docusate  1 tablet Oral BID     sodium chloride (PF)  10 mL Intracatheter Q7 Days     sodium chloride (PF)  3 mL Intracatheter Q8H     sodium chloride (PF)  3 mL Intracatheter During Hemodialysis (from stock)     sodium chloride (PF)  3 mL Intracatheter During Hemodialysis (from stock)       DOBUTamine 2.5 mcg/kg/min (05/08/18 0734)     heparin (porcine) 500 Units/hr (05/05/18 1533)     heparin (porcine)       - MEDICATION INSTRUCTIONS -       Reason ACE/ARB/ARNI order not selected       Reason beta blocker order not selected       Kristi Armas PA-C

## 2018-05-08 NOTE — PLAN OF CARE
Problem: Patient Care Overview  Goal: Plan of Care/Patient Progress Review  Outcome: No Change  D/I/A:  Pt with hx ICM undergoing heart/kidney transplant w/u.  Had an EUS yesterday revealing pancreatic cysts.  Dobutamine drip infusing.  TPAx2 to PICC with +blood return.  Denies pain, up ad edith, VSS.  Mg 1.6 replaced with 2GM.  P:  HD orders signed and held.  Am labs pending.  Update md's with changes/concerns.   Mg 2.0 this am-requested 2Gm from pharmacy-give when available.

## 2018-05-08 NOTE — PLAN OF CARE
Problem: Cardiac: Heart Failure (Adult)  Goal: Signs and Symptoms of Listed Potential Problems Will be Absent, Minimized or Managed (Cardiac: Heart Failure)  Signs and symptoms of listed potential problems will be absent, minimized or managed by discharge/transition of care (reference Cardiac: Heart Failure (Adult) CPG).   Outcome: No Change  Pt VSS; ST with -110s; RA with stats in the upper 90s. No complaints of pain or discomfort. HD run completed; anuria. Dobutamine gtt continues at 2.5mcg/kg/min. BS covered with SSI per parameters. Good appetite; simethicone PRN x1 for cramping with relief. Up ad edith in room. Continue to monitor and notify team with changes.

## 2018-05-08 NOTE — PROGRESS NOTES
D/I/A: Pt requesting capnography dc-VS as per flowsheet.  Pt refused 81mg ASA, PICC line without blood return-MD updated with previous-ok to dc capno, TPA ordered for PICC line and noted that ASA not taken.    P: Continue to monitor.

## 2018-05-08 NOTE — CONSULTS
[facility] Inpatient Consult Dermatology Note    Impression/Plan:  1. Benign skin lesions including seborrheic keratoses, dermatofibromas, multiple benign nevi    No further intervention required.  Discussed benign nature and etiology of these lesions  2. Skin cancer screening.     No lesions of concern for melanoma or non-melanoma skin cancer. Patient has low baseline risk given  and  background.    3. Nummular dermatitis, right buttock. Discussed etiology with patient and need for emollient use.     Moisturize BID with vanicream or other thick emollient    If pruritic (not currently), could use topical triamcinolone ointment 0.1% BID prn until it resolves    Thank you for the dermatology consultation. Please do not hesitate to contact the dermatology resident/faculty on call for any additional questions or concerns. We will sign off at this time.      Ana Luisa Henry  Dermatology Resident    I have seen and examined this patient and agree with the assessment and plan as documented in the resident's note.    Bobby Vasquez MD  Dermatology Attending        Dermatology Problem List:  1.Dermatofibroma  2. Seborrheic keratoses  3. Multiple benign nevi    Date of Admission: Apr 16, 2018   Encounter Date: 5/8/2018     Reason for Consultation:   Lesions of concern on  Back and legs    History of Present Illness:  Mr. Murray Nicholson is a 63 year old male with HTN, DMII, CHF/CM, ESRD who was admitted 4/16/18 for worsening dyspnea/SOB, now on dobutamine drip and being evaluated for heart/kidney transplant. Dermatology was consulted for lesions of concern on the back and legs. Patient reports he has a variety of dark spots on his legs and back. He is unaware of any of them changing, denies noticing any new lesions. He denies any tenderness, bleeding, or itching of any of the spots. Denies any change in size or color. No history of skin cancer or other skin problems.     Past Medical  History:   Patient Active Problem List   Diagnosis     Esophageal reflux     Hypersomnia with sleep apnea     Allergic rhinitis     CHF (congestive heart failure) (H)     CKD (chronic kidney disease) stage 5, GFR less than 15 ml/min (H)     Hyperlipidemia LDL goal <100     Hypertension goal BP (blood pressure) < 130/80     Hypertensive cardiopathy     Tubular adenoma     Anemia of chronic disease     Bicuspid aortic valve     CAD (coronary artery disease)     Anemia in chronic renal disease     Hypertension     Dyslipidemia     MGUS (monoclonal gammopathy of unknown significance)     Ascending aortic aneurysm (H)     Type 2 diabetes mellitus with diabetic chronic kidney disease (H)     Anemia, iron deficiency     Anemia in stage 5 chronic kidney disease (H)     Fluid overload     Chronic systolic heart failure (H)     Volume overload     Peritonitis (H)     Heart failure (H)     Encounter for therapeutic drug monitoring     Past Medical History:   Diagnosis Date     (HFpEF) heart failure with preserved ejection fraction (H)      Allergic rhinitis, cause unspecified      Anemia of chronic kidney failure      AS (aortic stenosis)      Ascending aortic aneurysm (H)      Bicuspid aortic valve      CAD (coronary artery disease)      Chronic kidney disease, stage 5 (H)      Congestive heart failure, unspecified      Dyslipidemia      Esophageal reflux      ESRD (end stage renal disease) (H)      Hypersomnia with sleep apnea, unspecified      Hypertension      MGUS (monoclonal gammopathy of unknown significance)      Mitral regurgitation      SHEELA (obstructive sleep apnea)      Systolic heart failure (H)      Type 2 diabetes mellitus (H)      Past Surgical History:   Procedure Laterality Date     COLONOSCOPY N/A 5/3/2018    Procedure: COLONOSCOPY;  colonoscopy ;  Surgeon: Ammon Castillo MD;  Location:  GI     LAPAROSCOPIC HERNIORRHAPHY INGUINAL BILATERAL Bilateral 7/24/2015    Procedure: LAPAROSCOPIC HERNIORRHAPHY  INGUINAL BILATERAL;  Surgeon: Bobby Mcconnell MD;  Location: [facility] OR     LAPAROSCOPIC INSERTION CATHETER PERITONEAL DIALYSIS N/A 6/22/2017    Procedure: LAPAROSCOPIC INSERTION CATHETER PERITONEAL DIALYSIS;  Laparoscopic Peritoneal Dialysis Catheter Placement - Anesthesia with block;  Surgeon: Esteban Arvizu MD;  Location: [facility] OR     PICC INSERTION Left 04/22/2018    5Fr - 49cm (3cm external), Basilic vein, low SVC     REMOVE CATHETER PERITONEAL Right 1/15/2018    Procedure: REMOVE CATHETER PERITONEAL;  Open Removal of Peritoneal Dialysis Catheter ;  Surgeon: Esteban Arvziu MD;  Location: [facility] OR         Social History:  The patient works as an actor originally, now doing more retail work. Originally from [place].    Family History:  There is no family history of melanoma.    Medications:  Current Facility-Administered Medications   Medication     0.9% sodium chloride BOLUS     albuterol (PROAIR HFA/PROVENTIL HFA/VENTOLIN HFA) Inhaler 2 puff     albuterol neb solution 2.5 mg     albuterol neb solution 2.5 mg     allopurinol (ZYLOPRIM) tablet 100 mg     alteplase (CATHFLO ACTIVASE) injection 2 mg     alteplase (CATHFLO ACTIVASE) injection 2 mg     aspirin chewable tablet 81 mg     atorvastatin (LIPITOR) tablet 40 mg     bisacodyl (DULCOLAX) Suppository 10 mg     bismuth subsalicylate (PEPTO BISMOL) suspension 15 mL     calcium acetate (PHOSLO) capsule 667 mg     cefTRIAXone (ROCEPHIN) 1 g vial to attach to  mL bag for ADULTS or NS 50 mL bag for PEDS     cetirizine (zyrTEC) tablet 10 mg     cyanocolbalamin (vitamin  B-12) tablet 100 mcg     glucose gel 15-30 g    Or     dextrose 50 % injection 25-50 mL    Or     glucagon injection 1 mg     DOBUTamine (DOBUTREX) 1000 mg in dextrose 5% 250 mL (adult MAX CONC) premix     doxycycline (VIBRAMYCIN) capsule 100 mg     fluticasone (FLONASE) 50 MCG/ACT spray 1-2 spray     gelatin absorbable (GELFOAM) sponge 1 each     heparin 10,000 units/10 mL infusion (DIALYSIS USE)      heparin 10,000 units/10 mL infusion (DIALYSIS USE)     heparin 1000 unit/mL DIALYSIS Cath Care     heparin 1000 unit/mL DIALYSIS Cath Care     heparin lock flush 10 UNIT/ML injection 2-5 mL     HOLD nitroGLYcerin IF     insulin aspart (NovoLOG) inj (RAPID ACTING)     insulin aspart (NovoLOG) inj (RAPID ACTING)     lidocaine (LMX4) kit     lidocaine (LMX4) kit     lidocaine 1 % 1 mL     loratadine (CLARITIN) tablet 10 mg     magnesium hydroxide (MILK OF MAGNESIA) suspension 30 mL     magnesium sulfate 2 g in NS intermittent infusion ([facility] or [facility] C[facility]d)     magnesium sulfate 4 g in 100 mL sterile water (premade)     May continue current IV fluids if patient has IV fluids infusing.     midodrine (PROAMATINE) tablet 10 mg     naloxone (NARCAN) injection 0.1-0.4 mg     naloxone (NARCAN) injection 0.1-0.4 mg     NEPHROCAPS capsule 1 capsule     No heparin via hemodialysis machine     omeprazole (priLOSEC) CR capsule 20 mg     ondansetron (ZOFRAN) injection 4 mg     polyethylene glycol (MIRALAX/GLYCOLAX) Packet 17 g     prochlorperazine (COMPAZINE) injection 5 mg     Reason ACE/ARB/ARNI order not selected     Reason beta blocker not prescribed     senna-docusate (SENOKOT-S;PERICOLACE) 8.6-50 MG per tablet 1 tablet     simethicone (MYLICON) chewable tablet 80 mg     sodium chloride (PF) 0.9% PF flush 10 mL     sodium chloride (PF) 0.9% PF flush 10 mL     sodium chloride (PF) 0.9% PF flush 10 mL     sodium chloride (PF) 0.9% PF flush 10-20 mL     sodium chloride (PF) 0.9% PF flush 3 mL     sodium chloride (PF) 0.9% PF flush 3 mL     sodium chloride (PF) 0.9% PF flush 3 mL     sodium chloride (PF) 0.9% PF flush 3 mL     sodium chloride (PF) 0.9% PF flush 3 mL     traMADol (ULTRAM) tablet 50 mg          Allergies   Allergen Reactions     Norco [Hydrocodone-Acetaminophen] Nausea and Vomiting     Cats      Throat tightness     Hydralazine Other (See Comments)     Didn't tolerate secondary to hypotension     Isosorbide  "Other (See Comments)     hypotension     Penicillins Hives     Seasonal Allergies      rhinitis     Shrimp      Throat closes          Review of Systems:  -As per HPI      Physical exam:  Vitals: /73  Pulse 107  Temp 98.4  F (36.9  C) (Oral)  Resp 18  Ht 1.727 m (5' 8\")  Wt 78.8 kg (173 lb 12.8 oz)  SpO2 100%  BMI 26.43 kg/m2  GEN: This is a well developed, well-nourished male in no acute distress, in a pleasant mood.    SKIN: Total skin excluding the undergarment areas was performed. The exam included the head/face, neck, both arms, chest, back, abdomen, both legs, digits and/or nails.   -There is a firm black/darkbrown colored papule that dimples with lateral pressure on the left medial leg, right shin, calf.  Few regular brown pigmented macules and papules are identified on the back.   There are waxy stuck on dark brown papules on the upper back and shoulders c/w SKs  - dilated pore with oxidized keratin debri centrally on left upper back   - xerosis over the buttocks with nummular thin scaly plaque on the right buttock   -No other lesions of concern on areas examined.     Laboratory:  Results for orders placed or performed during the hospital encounter of 04/16/18 (from the past 24 hour(s))   UPPER EUS   Result Value Ref Range    [address] [place] [address] [phone]     Endoscopy Department  _______________________________________________________________________________  Patient Name: Murray Nicholson            Procedure Date: 5/7/2018 2:18 PM  MRN: [id number]                       Account Number: [id number]  YOB: 1955               Admit Type: Inpatient  Age: 63                               Room: OR  Gender: Male                          Note Status: Finalized  Attending MD: Alon Don MD   Total Sedation Time:   _______________________________________________________________________________     Procedure:           " Upper EUS  Indications:         Pancreatic cyst on CT scan  Providers:           Alon Don MD  Patient Profile:     Mr Nicholson is a 62yo gentleman with severe cardiac                        disease and active heart failure under consideration for                        transplant who was found to have cystic lesions of the                         pancreas. Per request of his cardiology team he proceeds                        to further evaluation by EUS.  Referring MD:        Yahir Turcios MD  Medicines:           General Anesthesia, Ciprofloxacin 400 mg IV  Complications:       No immediate complications.  _______________________________________________________________________________  Procedure:           Pre-Anesthesia Assessment:                       - Prior to the procedure, a History and Physical was                        performed, and patient medications and allergies were                        reviewed. The patient is competent. The risks and                        benefits of the procedure and the sedation options and                        risks were discussed with the patient. All questions                        were answered and informed consent was obtained. Patient                        identification and proposed procedure were verified by                        the nurse in [facility]. Mental Status                        Examination: alert and oriented. Airway Examination:                        Mallampati Class II (the uvula but not tonsillar pillars                        visualized). Respiratory Examination: bibasilar                        crackles. CV Examination: systolic murmur. ASA Grade                        Assessment: III - A patient with severe systemic                        disease. After reviewing the risks and benefits, the                        patient was deemed in satisfactory condition to undergo                        the procedure. The anesthesia  plan was to use deep                        sedation / analgesia. Immediately prior to                        administration of medications, the patient was                        re-assessed for adequacy to receive sedatives. The heart                        rate, respiratory rate, oxygen saturations, blood                        pressure, adequacy of pulmonary ventilation, and                         response to care were monitored throughout the                        procedure. The physical status of the patient was                        re-assessed after the procedure. After obtaining                        informed consent, the endoscope was passed under direct                        vision. Throughout the procedure, the patient's blood                        pressure, pulse, and oxygen saturations were monitored                        continuously. The echoendoscope was introduced through                        the mouth, and advanced to the second part of duodenum.                        The upper EUS was accomplished without difficulty. The                        patient tolerated the procedure well.                                                                                   Findings:       White light and endosonographic findings :       A curved linear echoendoscope was utilized throughout. Limited white        light views of the foregut were grossly  unremarkable save for mild        erythema of the antrum. The ampulla was tangentially visualized and        without over mass. In terms of endosonography, multiple simple cystic        lesions were found in the head (largest 12.0x8.5mm), body (largest        11.7x9.3mm) and tail (largest 3mm). None of these demonstrated        concerning features such as involving the main duct, mural nodule, wall        thickening or associations with solid lesions. Clear side branch        communication was not found. The remainder of the pancreatic parenchyma         was without solid or other concenring lesion. The main pancreatic duct        was traced from tail to head and found to be traditional in course        without dilation measuring 1.2mm in the head with smooth taper to less        than 1mm in the tail. There were no overtly dilated side branches. The        duct wall was hyperechoic in multiple locations and there was no        evidence of internal solid structure. The gallbladder is in  situ without        wall thickening or internal solid component. The common duct was traced        from bifurcation to ampulla and found to be without dilation (measuring        up to just above 3mm), wall thickening or internal solid component.        There were several benign appearing nodes, triangular to oval in shape        and measuring near 10mm in the periportal and peripancreatic regions.        The major vascular structures of the abdomen including the splenics,        superior mesenterics, portal and celiac were unremarkable. Limited views        of the left lobe, right lobe, caudate lobe, left kidney and spleen were        unremarkable. Diagnostic needle aspiration for fluid was performed.        Color Doppler imaging was utilized prior to needle puncture to confirm a        lack of significant vascular structures within the needle path. One pass        was made with the 22 gauge needle using a transduodenal approach. No        stylet was used. The amount of fluid collecte d was 1 mL. The fluid was        clear and thin. Sample(s) were sent for amylase concentration and CEA.                                                                                   Impression:          - Multiple simple cystic lesions were found in the head                        (largest 12mm), body (largest 12mm) and tail (largest                        3mm). None of these demonstrated concerning features                        such as involving the main duct, mural nodule, wall                         thickening or associations with solid lesions. Given the                        multuplicity, we would have favored intraductal mucinous                        neoplasms, however sampling of the largest in the head                        demonstrated thin clear fluid suggestive of pseudocyst.                        Studies were requested to help discern the etiology. No                        other pancreatic lesions were demonstrated and the main                        duct was un remarkable as was the gallbladder and biliary                        system. There were several benign appearing nodes,                        triangular to oval in shape and measuring near 10mm in                        the periportal and peripancreatic regions. The major                        vascular structures of the abdomen were unremarkable.  Recommendation:      - Standard inpatient general anesthesia recovery with                        return to the floor when appropriate                       - Complete a short course (3 day) of ciprofloxacin                        (first dose given)                       - Avoid antithrombotics for at least three days                       - If fluid returns consistent with IPMN then                        surveillance by noninvasive imaging (MRI in 1 year)                        until imaging has concerning finding; if fluid returns                        consistent with pseudocyst then no further surveillance                        requir ed                       - The findings and recommendations were discussed with                        the patient                                                                                     electronically signed by NATALIE Don  _____________________  Alon Don MD  5/7/2018 3:44:01 PM  I was physically present for the entire viewing portion of the exam.  __________________________  Signature of teaching physician  Vinicio/Maria Isabel  MD Arian  Number of Addenda: 0    Note Initiated On: 5/7/2018 2:18 PM  Scope In:  Scope Out:     Pancreas cyst panel   Result Value Ref Range    CEA Fluid 184.1 ug/L    Cytology Test Sent to Cytology Lab     Amylase Fluid 80427 U/L   Glucose by meter   Result Value Ref Range    Glucose 133 (H) 70 - 99 mg/dL   Glucose by meter   Result Value Ref Range    Glucose 276 (H) 70 - 99 mg/dL   Glucose by meter   Result Value Ref Range    Glucose 178 (H) 70 - 99 mg/dL   CBC with platelets   Result Value Ref Range    WBC 4.4 4.0 - 11.0 10e9/L    RBC Count 2.90 (L) 4.4 - 5.9 10e12/L    Hemoglobin 8.6 (L) 13.3 - 17.7 g/dL    Hematocrit 28.3 (L) 40.0 - 53.0 %    MCV 98 78 - 100 fl    MCH 29.7 26.5 - 33.0 pg    MCHC 30.4 (L) 31.5 - 36.5 g/dL    RDW 15.5 (H) 10.0 - 15.0 %    Platelet Count 207 150 - 450 10e9/L   Magnesium   Result Value Ref Range    Magnesium 2.0 1.6 - 2.3 mg/dL   Basic metabolic panel   Result Value Ref Range    Sodium 137 133 - 144 mmol/L    Potassium 4.1 3.4 - 5.3 mmol/L    Chloride 97 94 - 109 mmol/L    Carbon Dioxide 31 20 - 32 mmol/L    Anion Gap 8 3 - 14 mmol/L    Glucose 127 (H) 70 - 99 mg/dL    Urea Nitrogen 25 7 - 30 mg/dL    Creatinine 8.99 (H) 0.66 - 1.25 mg/dL    GFR Estimate 6 (L) >60 mL/min/1.7m2    GFR Estimate If Black 7 (L) >60 mL/min/1.7m2    Calcium 9.0 8.5 - 10.1 mg/dL   Glucose by meter   Result Value Ref Range    Glucose 118 (H) 70 - 99 mg/dL   Ferritin   Result Value Ref Range    Ferritin 621 (H) 26 - 388 ng/mL   Iron and iron binding capacity   Result Value Ref Range    Iron 58 35 - 180 ug/dL    Iron Binding Cap 218 (L) 240 - 430 ug/dL    Iron Saturation Index 27 15 - 46 %     *Note: Due to a large number of results and/or encounters for the requested time period, some results have not been displayed. A complete set of results can be found in Results Review.       Dr. Vasquez staffed the patient.    Staff Involved:  Resident(Ana Luisa Henry)/Staff(as above)

## 2018-05-08 NOTE — PROGRESS NOTES
Our cardiology colleagues requested that we comment about the risk of transformation of what appears to be multiple benign BD-IPMNs to high grade dysplasia or carcinoma. Traditional these have a low rate of transformation, a small but relevant portion of patients will progress to developing high-risk lesions. Risk of transformation correlated to rate of growth, overall size, associated solid lesions (including mural nodules) and main duct involvement. Currently, he has none of these concerns. However, the fact that many of these lesions take years to become worrisome suggests that surveillance in these patients is a long-term proposition. That said, what has been demonstrated thus far should not detrimentally impact his transplant candidacy.    Alon Don MD PhD  Director of Endoscopy   of Medicine, Surgery and Pediatrics  Interventional and Therapeutic Endoscopy    [facility]  Division of Gastroenterology and Hepatology  [facility] [address] [address]    New Consultations  [phone]  Procedure Scheduling [phone]  Clinical Nurse Coordinator [phone]  Clinical Fax   [phone]  Administrative   [phone]  Administrative Fax  [phone]

## 2018-05-08 NOTE — PROGRESS NOTES
[facility]   Cardiology II Service / Advanced Heart Failure  Daily Progress Note      Patient: Murray Nicholson  MRN: [id number]  Admission Date: 4/16/2018  Hospital Day # 22    Assessment and Plan: Murray is a 63 year old male with a PMH of CAD, ICM (EF of 15-20%), ESRD, bicuspid aortic valve, severe MR, and proximal AAA who was admitted for decompensated heart failure. He is currently being worked up for heart/kidney transplant and remains on dobutamine 2.5 mcg/kg/min.       Today's Plan:   - f/u fluid studies from FNA  - dermatology consult pending   - three day course of doxy and rocephin for PPx after EUS      # Chronic systolic heart failure secondary to ICM  # Severe mitral regurgitation  # Severe holodiastolic aortic insufficiency   Stage D  NYHA Class III    Fluid status: euvolemic, on HD  Inotropy: continue dobutamine 2.5 mcg/kg/min  ACEi/ARB: contraindicated due to renal dysfunction  BB: contraindicated due to hypotension and is currently on inotropes  Aldosterone antagonist: contraindicated due to renal dysfunction  SCD prophylaxis: none, defer given potential for transplant listing  Sleep apnea: Yes, unable to tolerate CPAP  *Transplant workup pending: EUS 5/7, fluid aspiration studies pending, derm consult pending, LVAD backup option for MVR and AVR no longer being considered.        # Multiple simple cystic lesion in pancreas  # Benign-appearing lesions, periportal and peripancreatic  MR of abdomen 4/16/18 : Limited MR assessment due to exam interruption. Multiple cystic lesions in the pancreas, compatible with IPMNs. The largest of these measures 2.0 cm in maximum dimension. No main duct dilation or suspicious nodularity or septation.   -GI consulted. Appreciate recs.  -EUS with FNA 5/7, results pending  -GI recommended three days of cipro, QTc prolonged so opted for three days of doxy plus rocephin for biliary coverage. Per procedure note, received cipro 400 mg IV intraop.      #  "NSVT  Monitor on tele, likely exacerbated by dobutamine. Keep K>4 Mg>2. 10 beat run 5/7, asymptomatic. Note QTc is prolonged, avoid prolonging agents.     # Multiple nevi concerning for malignancy  - dermatology consulted per renal request prior to transplant listing    # Hx of allergic rhinitis  # Post nasal drip  - certrizine 10 mg daily    # Gas pains  Regular BMs per patient.   - simethicone prn     Chronic Medical Issues:  # ESRD:  Currently getting dialysis on T, Th, Sa   # DM II: Last Hemoglobin A1C 4/4/18  6.3.  Home meds: Glipizide 5 mg daily. Continue high intensity SS insulin. BGs 120-200 mostly.   # CAD:  Coronary angiogram 2/8/17 showed no obstructive CAD.  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  Continue ASA 81 mg daily (held 5/8-5/10 after FNA) and Atorvastatin 40 mg daily.   # Ascending aortic aneurysm:  4.5 x 4.5 cm proximal ascending aortic aneurysm seen on MRI 2/14. Recent echo 2/2/18 showed size of 4.2 cm. Continue to monitor with serial echoes. BP control.      FEN: 2 gm sodium diet, 2L FR  PROPHY: Ambulation  LINES: PICC  DISPO: TBD  CODE STATUS:  Full  ================================================================    Subjective/24-Hr Events:   Last 24 hr care team notes reviewed. Patient reports feeling fine, no complaints overnight. Tolerated EUS with FNA without complication. Examined during HD run. No complaints or shortness of breath, chest pain, lightheadedness, palpitations, abdominal pain.     ROS:  4 point ROS including respiratory, CV, GI and  (other than that noted in the HPI) is negative.     Medications: Reviewed in EPIC.     Physical Exam:   /75 (BP Location: Right arm)  Pulse 107  Temp 98.8  F (37.1  C) (Oral)  Resp 18  Ht 1.727 m (5' 8\")  Wt 78.8 kg (173 lb 12.8 oz)  SpO2 100%  BMI 26.43 kg/m2    GENERAL: Appears comfortable, in no distress.  HEENT: Eye symmetrical, no discharge or icterus bilaterally. Mucous membranes moist and " without lesions.  NECK: Supple, JVD just above clavicle at 90 degrees.   CV: Mildly tachycardic, +S1S2, soft murmur across precordium, rub, or gallop.   RESPIRATORY: Respirations regular, even, and unlabored. Lungs CTA throughout.   GI: Soft and mildly distended with normoactive bowel sounds present in all quadrants. No tenderness, rebound, guarding.   EXTREMITIES: No peripheral edema. 2+ bilateral pedal pulses. Warm.   NEUROLOGIC: Alert and oriented x 3. No focal deficits.   MUSCULOSKELETAL: No joint swelling or tenderness.   SKIN: No jaundice. No rashes or lesions.     Labs:  CMP    Recent Labs  Lab 05/08/18  0455 05/07/18  0649 05/04/18  0645 05/03/18  0530 05/02/18  0605 05/01/18  0930    135 136 138 134 133   POTASSIUM 4.1 4.0 4.2 4.5 4.6 5.0   CHLORIDE 97 97 95 96 94 94   CO2 31 30 31 32 31 31   ANIONGAP 8 8 10 9 8 8   * 123* 104* 109* 66* 100*   BUN 25 21 12 21 18 36*   CR 8.99* 7.51* 5.86* 7.36* 5.91* 8.51*   GFRESTIMATED 6* 7* 10* 8* 10* 6*   GFRESTBLACK 7* 9* 12* 9* 12* 8*   TRINI 9.0 9.1 8.9 9.6 9.4 9.7   MAG 2.0 1.6  --  2.4* 1.9 2.4*   PHOS  --   --   --   --   --  3.2       CBC    Recent Labs  Lab 05/08/18  0455 05/07/18  0649 05/03/18  0530 05/02/18  0605   WBC 4.4 4.9 5.5 3.5*   RBC 2.90* 3.12* 3.40* 3.39*   HGB 8.6* 9.5* 10.4* 10.3*   HCT 28.3* 30.4* 33.4* 32.9*   MCV 98 97 98 97   MCH 29.7 30.4 30.6 30.4   MCHC 30.4* 31.3* 31.1* 31.3*   RDW 15.5* 15.4* 15.2* 15.3*    222 226 222       INR    Recent Labs  Lab 05/07/18  0649   INR 1.20*       Time/Communication  I personally spent a total of 25 minutes. Of that 15 minutes was counseling/coordination of patient's care. Plan of care discussed with patient. See my note above for details.    Patient discussed with Dr. Austin.      Ramya Suh, FRANK, NP-C  Advanced Heart Failure/Cardiology II Service  Pager [phone]

## 2018-05-08 NOTE — PROGRESS NOTES
HEMODIALYSIS TREATMENT NOTE    Date: 5/8/2018  Time: 12:13 PM    Data:  Pre Wt: 78.8 kg (173 lb 11.6 oz)   Desired Wt: 77 kg   Post Wt: 77 kg (169 lb 12.1 oz)  Weight gain: -1.8 kg off  Weight change: 1.8 kg  Ultrafiltration - Post Run Net Total Removed (mL): 1800 mL  Ultrafiltration - Post Run Net Total Gain (mL): 0 mL  Vascular Access Status: Yes, secured and visible  Dialyzer Rinse: Streaked, Moderate  Total Blood Volume Processed: 70.7 L  Total Dialysis (Treatment) Time:  3hr 30 mins    Lab:   YES     Assessment:  Patent cath. Draw labs for Ferritin and Iron and Iron binding capaciry       Interventions:  Stable and tolerable for 3.5 hrs run. Use minimal heparin during HD. Gave Venofer 50 mg IV during HD(See MAR). Finished HD with rinse back, CVC locked with 1:1000 units heparin. (Vol. Specific)  Changed CVC dressing with bio-patch.     Plan:    Next run per renal team

## 2018-05-08 NOTE — PROGRESS NOTES
5/8/2018 Gastroenterology Brief Note    Patient seen to discuss results of EUS FNA  He is doing well today, no concerns.  Component      Latest Ref Rng & Units 5/7/2018   CEA Fluid      ug/L 184.1   Cytology Test       Sent to Cytology Lab   Amylase Fluid      U/L 57091   CEA level is equivocal, will require follow up imaging (tentative MRI in ~6mo) pending cytology  Please see documentation from Dr. Don regarding transplant candidacy    Discussed with cardiology team    Please call GI with further questions    Mago Ernst PA-C  Advanced Endoscopy/Pancreaticobiliary Service  Pager *[phone]

## 2018-05-08 NOTE — PLAN OF CARE
Problem: Cardiac: Heart Failure (Adult)  Goal: Signs and Symptoms of Listed Potential Problems Will be Absent, Minimized or Managed (Cardiac: Heart Failure)  Signs and symptoms of listed potential problems will be absent, minimized or managed by discharge/transition of care (reference Cardiac: Heart Failure (Adult) CPG).   Outcome: No Change  1530-1930. VSS. SR/ST. Denies dyspnea with activity. Continues on dobutamine gtt 2.5.mcg/kg/min. No d/o pain. Up ad edith in room. BG corrected per SS. Continue with POC.

## 2018-05-08 NOTE — PLAN OF CARE
Problem: Patient Care Overview  Goal: Plan of Care/Patient Progress Review  PT - Cancel/defer, per chart review and discussion with interdisciplinary team, pt does not require skilled inpatient physical therapy at this time. Is independent with transfers, gait, stairs, no falls in last 6 months.  OT is following and may involve PT post-transplant. Please re-consult as needed if patient experiences a change in functional mobility or goals requiring skilled inpatient physical therapy.

## 2018-05-08 NOTE — PROGRESS NOTES
Transplant Social Work Services Progress Note      Date of Initial Social Work Evaluation: 4/26/28  Collaborated with: Dr. Geovanna Gao, Cards 2 NP (Kristi)    Data: SW was asked to see Murray to assess compliance. Both this SW and Kidney Transplant SW attempted multiple times to see him yesterday; but he was unavailable at each attempt.   Intervention: Met with Murray to discuss his de-listing from Kidney Transplant list due to many no show appointments and not returning phone calls from the nurse coordinators.   Assessment: Murray reports that he was in denial of his illness and importance of his appointments. He reports that he was continuing to work full time and making time for his health had taken less of a priority.  He reports that he feels badly that he didn't take it seriously, but understands the importance. Discussed the importance of life long compliance with medical appointments, including post transplant. Murray reports that he understands that this needs to take priority and is agreeable to make follow up more seriously.   Education provided by SW: medical appointment compliance  Plan:    Discharge Plans in Progress: pending medical plan. Possibly inpatient waiting for heart/kidney transplant.     Barriers to d/c plan: medical stability.     Follow up Plan: SW will continue to follow for transplant related issues.    Pager [phone]

## 2018-05-09 ENCOUNTER — APPOINTMENT (OUTPATIENT)
Dept: OCCUPATIONAL THERAPY | Facility: CLINIC | Age: 63
DRG: 001 | End: 2018-05-09
Attending: INTERNAL MEDICINE
Payer: COMMERCIAL

## 2018-05-09 LAB
GLUCOSE BLDC GLUCOMTR-MCNC: 123 MG/DL (ref 70–99)
GLUCOSE BLDC GLUCOMTR-MCNC: 143 MG/DL (ref 70–99)
GLUCOSE BLDC GLUCOMTR-MCNC: 151 MG/DL (ref 70–99)
GLUCOSE BLDC GLUCOMTR-MCNC: 192 MG/DL (ref 70–99)
GLUCOSE BLDC GLUCOMTR-MCNC: 247 MG/DL (ref 70–99)

## 2018-05-09 PROCEDURE — 97110 THERAPEUTIC EXERCISES: CPT | Mod: GO

## 2018-05-09 PROCEDURE — A9270 NON-COVERED ITEM OR SERVICE: HCPCS | Mod: GY | Performed by: NURSE PRACTITIONER

## 2018-05-09 PROCEDURE — 21400006 ZZH R&B CCU INTERMEDIATE UMMC

## 2018-05-09 PROCEDURE — 40000275 ZZH STATISTIC RCP TIME EA 10 MIN

## 2018-05-09 PROCEDURE — 00000146 ZZHCL STATISTIC GLUCOSE BY METER IP

## 2018-05-09 PROCEDURE — 25000132 ZZH RX MED GY IP 250 OP 250 PS 637: Mod: GY | Performed by: NURSE PRACTITIONER

## 2018-05-09 PROCEDURE — 25000125 ZZHC RX 250: Performed by: STUDENT IN AN ORGANIZED HEALTH CARE EDUCATION/TRAINING PROGRAM

## 2018-05-09 PROCEDURE — 25000128 H RX IP 250 OP 636: Performed by: NURSE PRACTITIONER

## 2018-05-09 PROCEDURE — 99232 SBSQ HOSP IP/OBS MODERATE 35: CPT | Performed by: NURSE PRACTITIONER

## 2018-05-09 PROCEDURE — 25000132 ZZH RX MED GY IP 250 OP 250 PS 637: Performed by: NURSE PRACTITIONER

## 2018-05-09 PROCEDURE — 25000128 H RX IP 250 OP 636: Performed by: INTERNAL MEDICINE

## 2018-05-09 PROCEDURE — 25000132 ZZH RX MED GY IP 250 OP 250 PS 637: Performed by: STUDENT IN AN ORGANIZED HEALTH CARE EDUCATION/TRAINING PROGRAM

## 2018-05-09 PROCEDURE — 40000802 ZZH SITE CHECK

## 2018-05-09 PROCEDURE — A9270 NON-COVERED ITEM OR SERVICE: HCPCS | Mod: GY | Performed by: STUDENT IN AN ORGANIZED HEALTH CARE EDUCATION/TRAINING PROGRAM

## 2018-05-09 PROCEDURE — 40000133 ZZH STATISTIC OT WARD VISIT

## 2018-05-09 PROCEDURE — 94640 AIRWAY INHALATION TREATMENT: CPT

## 2018-05-09 RX ADMIN — Medication 1 CAPSULE: at 07:37

## 2018-05-09 RX ADMIN — CALCIUM ACETATE 667 MG: 667 CAPSULE ORAL at 13:32

## 2018-05-09 RX ADMIN — CETIRIZINE HYDROCHLORIDE 10 MG: 10 TABLET, FILM COATED ORAL at 07:40

## 2018-05-09 RX ADMIN — VITAMIN B12 0.1 MG ORAL TABLET 100 MCG: 0.1 TABLET ORAL at 07:37

## 2018-05-09 RX ADMIN — INSULIN ASPART 1 UNITS: 100 INJECTION, SOLUTION INTRAVENOUS; SUBCUTANEOUS at 18:31

## 2018-05-09 RX ADMIN — ALLOPURINOL 100 MG: 100 TABLET ORAL at 07:37

## 2018-05-09 RX ADMIN — SIMETHICONE CHEW TAB 80 MG 80 MG: 80 TABLET ORAL at 17:45

## 2018-05-09 RX ADMIN — DOBUTAMINE HYDROCHLORIDE 2.5 MCG/KG/MIN: 400 INJECTION INTRAVENOUS at 02:56

## 2018-05-09 RX ADMIN — ATORVASTATIN CALCIUM 40 MG: 40 TABLET, FILM COATED ORAL at 20:08

## 2018-05-09 RX ADMIN — CEFTRIAXONE 1 G: 1 INJECTION, POWDER, FOR SOLUTION INTRAMUSCULAR; INTRAVENOUS at 09:39

## 2018-05-09 RX ADMIN — CALCIUM ACETATE 667 MG: 667 CAPSULE ORAL at 17:45

## 2018-05-09 RX ADMIN — CALCIUM ACETATE 667 MG: 667 CAPSULE ORAL at 07:37

## 2018-05-09 RX ADMIN — SIMETHICONE CHEW TAB 80 MG 80 MG: 80 TABLET ORAL at 08:08

## 2018-05-09 RX ADMIN — POLYETHYLENE GLYCOL 3350 17 G: 17 POWDER, FOR SOLUTION ORAL at 07:37

## 2018-05-09 RX ADMIN — INSULIN ASPART 3 UNITS: 100 INJECTION, SOLUTION INTRAVENOUS; SUBCUTANEOUS at 13:32

## 2018-05-09 RX ADMIN — DOXYCYCLINE HYCLATE 100 MG: 100 CAPSULE ORAL at 07:37

## 2018-05-09 RX ADMIN — SIMETHICONE CHEW TAB 80 MG 80 MG: 80 TABLET ORAL at 02:14

## 2018-05-09 RX ADMIN — FLUTICASONE PROPIONATE 2 SPRAY: 50 SPRAY, METERED NASAL at 20:07

## 2018-05-09 RX ADMIN — OMEPRAZOLE 20 MG: 20 CAPSULE, DELAYED RELEASE ORAL at 20:08

## 2018-05-09 RX ADMIN — DOXYCYCLINE HYCLATE 100 MG: 100 CAPSULE ORAL at 20:08

## 2018-05-09 RX ADMIN — ALBUTEROL SULFATE 2.5 MG: 2.5 SOLUTION RESPIRATORY (INHALATION) at 21:56

## 2018-05-09 NOTE — PROGRESS NOTES
Transplant Social Work Services Progress Note      Collaborated with: Murray    Data: Murray remains hospitalized waiting for heart/kidney transplant.   Intervention: Met with Murray to discuss ST/LT disability paperwork. Discussed ESRD Medicare and Medicare after transplant.   Assessment: Murray will check into his LT disability and how insurance continues to remain active. He had good questions about Medicare.   Education provided by [facility]: Medicare  Plan:    Discharge Plans in Progress: pending heart/kidney transplant    Barriers to d/c plan: awaiting heart/kidney transplant    Follow up Plan: [facility] will continue to be available for support as needed.  Pager [phone]

## 2018-05-09 NOTE — PLAN OF CARE
Problem: Cardiac: Heart Failure (Adult)  Goal: Signs and Symptoms of Listed Potential Problems Will be Absent, Minimized or Managed (Cardiac: Heart Failure)  Signs and symptoms of listed potential problems will be absent, minimized or managed by discharge/transition of care (reference Cardiac: Heart Failure (Adult) CPG).   Outcome: No Change  1900-2330  Patient admitted for decompensated heart failure, currently being worked up for heart and kidney transplant. VSS. Tele sinus tach. PRN Tramadol given for c/o right shoulder/upper back discomfort. Dobutamine gtt at 2.5 mcg/kg/min. Patient is anuric d/t hemodialysis. Up ad edith. Tolerating PO intake. HS blood sugar required SSI. No acute issues, continue to monitor and follow POC.

## 2018-05-09 NOTE — PLAN OF CARE
Problem: Patient Care Overview  Goal: Plan of Care/Patient Progress Review  Discharge Planner OT   Patient plan for discharge: Home with OP CR Phase II pending medical course  Current status: Independent with bed mobility and sit <> stand without AD. Independent with functional mobility to complete ~1200 ft with short rest breaks. Completed x 15 minutes on Nu-step Lv 4, good tolerance of activity. Completed x 4 standing BUE calisthenic exercises x 1 minute continuous with x 1-2 minute seated rest break. Completed x 10 continuous sit <> stands, x 5 lunges. VSS: HR at rest 106, HR during activity 110-128 with x 1 occurrence of 132. BP pre activity 107/71 (70), taken intermittently during activity as follows: 108/68 (75), 108/74 (85).  Barriers to return to prior living situation: medical stability/course, heart and kidney transplant work-up  Recommendations for discharge: Home with OP CR Phase II pending medical course  Rationale for recommendations: Anticipate pt will be safe to discharge home with continued therapy to maximize independence and aerobic activity tolerance pending medical course.        Entered by: Edith Bloom 05/09/2018 4:06 PM

## 2018-05-09 NOTE — PLAN OF CARE
Problem: Cardiac: Heart Failure (Adult)  Goal: Signs and Symptoms of Listed Potential Problems Will be Absent, Minimized or Managed (Cardiac: Heart Failure)  Signs and symptoms of listed potential problems will be absent, minimized or managed by discharge/transition of care (reference Cardiac: Heart Failure (Adult) CPG).   Outcome: No Change  Pt VSS; ST with HR in the 100s; RA with sats in the upper 90s. Denies pain. Dobutamine gtt continues at 2.5 mcg/kg/min. BS covered with SSI per parameters; very good appetite. Simethicone x1 for cramping with relief. Up ad edith. Anuria; next scheduled HD run tomorrow 5/10. Continue to monitor and notify team with changes.

## 2018-05-09 NOTE — PROGRESS NOTES
[facility]   Cardiology II Service / Advanced Heart Failure  Daily Progress Note      Patient: Murray Nicholson  MRN: [id number]  Admission Date: 4/16/2018  Hospital Day # 23    Assessment and Plan: Murray is a 63 year old male with a PMH of CAD, ICM (EF of 15-20%), ESRD, bicuspid aortic valve, severe MR, and proximal AAA who was admitted for decompensated heart failure. He is currently being worked up for heart/kidney transplant and remains on dobutamine 2.5 mcg/kg/min.       Today's Plan:   - f/u fluid studies from FNA  - no medical changes today      # Chronic systolic heart failure secondary to ICM  # Severe mitral regurgitation  # Severe holodiastolic aortic insufficiency   Stage D  NYHA Class III    Fluid status: euvolemic, on HD  Inotropy: continue dobutamine 2.5 mcg/kg/min  ACEi/ARB: contraindicated due to renal dysfunction  BB: contraindicated due to hypotension/currently on inotropes  Aldosterone antagonist: contraindicated due to renal dysfunction  SCD prophylaxis: none, defer given potential for transplant listing  Sleep apnea: yes, unable to tolerate CPAP  *Transplant workup pending: EUS 5/7, fluid aspiration studies pending, LVAD backup option for MVR and AVR no longer being considered, plan to discuss at interdisciplinary meeting on 5/11      # Multiple simple cystic lesion in pancreas  # Benign-appearing lesions, periportal and peripancreatic  MR of abdomen 4/16/18 : Limited MR assessment due to exam interruption. Multiple cystic lesions in the pancreas, compatible with IPMNs. The largest of these measures 2.0 cm in maximum dimension. No main duct dilation or suspicious nodularity or septation.   -GI consulted, appreciate rec  -EUS with FNA 5/7, cytology pending  -GI recommended three days of cipro, QTc prolonged so opted for three days of doxy plus rocephin for biliary coverage 5/7-5/9. Per procedure note, received cipro 400 mg IV intraop.      # NSVT  Monitor on tele, likely exacerbated by  "dobutamine. Keep K>4 Mg>2. 10 beat run 5/7, asymptomatic. Note QTc is prolonged, avoid prolonging agents.     # Seborrheic keratoses  # Dermatofibromas  # Multiple benign nevi  # Nummular dermatitis   - dermatology consulted per renal request for suspicious lesions prior to transplant listing, lesions all felt to be benign, no need for further testing/considered low risk     # Hx of allergic rhinitis  # Post nasal drip  - certrizine 10 mg daily    # Gas pains  Regular BMs per patient.   - simethicone prn     Chronic Medical Issues:  # ESRD:  Currently getting dialysis on T, Th, Sa   # DM II: Last Hemoglobin A1C 4/4/18  6.3.  Home meds: Glipizide 5 mg daily. Continue high intensity SS insulin. BGs 120-200 mostly.   # CAD:  Coronary angiogram 2/8/17 showed no obstructive CAD.  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  Continue ASA 81 mg daily (held 5/8-5/10 after FNA) and Atorvastatin 40 mg daily.   # Ascending aortic aneurysm:  4.5 x 4.5 cm proximal ascending aortic aneurysm seen on MRI 2/14. Recent echo 2/2/18 showed size of 4.2 cm. Continue to monitor with serial echoes. BP control.      FEN: regular diet, 2L FR  PROPHY: Ambulation  LINES: PICC  DISPO: TBD  CODE STATUS:  Full  ================================================================    Subjective/24-Hr Events:   Last 24 hr care team notes reviewed. Patient reports feeling fine, no complaints overnight. Slept well. Had many questions this morning re: potential transplant listing/next steps. No complaints or shortness of breath, chest pain, lightheadedness, palpitations, abdominal pain.     ROS:  4 point ROS including respiratory, CV, GI and  (other than that noted in the HPI) is negative.     Medications: Reviewed in EPIC.     Physical Exam:   /67 (BP Location: Right arm)  Pulse 107  Temp 98.2  F (36.8  C) (Oral)  Resp 16  Ht 1.727 m (5' 8\")  Wt 78 kg (172 lb)  SpO2 97%  BMI 26.15 kg/m2    GENERAL: Appears " comfortable, in no distress.  HEENT: Eye symmetrical, no discharge or icterus bilaterally. Mucous membranes moist and without lesions.  NECK: Supple, JVD just above clavicle at 90 degrees.   CV: Mildly tachycardic, +S1S2, soft murmur across precordium, rub, or gallop.   RESPIRATORY: Respirations regular, even, and unlabored. Lungs CTA throughout.   GI: Soft and mildly distended with normoactive bowel sounds present in all quadrants. No tenderness, rebound, guarding.   EXTREMITIES: No peripheral edema. 2+ bilateral pedal pulses. Warm.   NEUROLOGIC: Alert and oriented x 3. No focal deficits.   MUSCULOSKELETAL: No joint swelling or tenderness.   SKIN: No jaundice. No rashes or lesions.     Labs:  CMP    Recent Labs  Lab 05/08/18  0455 05/07/18  0649 05/04/18  0645 05/03/18  0530    135 136 138   POTASSIUM 4.1 4.0 4.2 4.5   CHLORIDE 97 97 95 96   CO2 31 30 31 32   ANIONGAP 8 8 10 9   * 123* 104* 109*   BUN 25 21 12 21   CR 8.99* 7.51* 5.86* 7.36*   GFRESTIMATED 6* 7* 10* 8*   GFRESTBLACK 7* 9* 12* 9*   TRINI 9.0 9.1 8.9 9.6   MAG 2.0 1.6  --  2.4*       CBC    Recent Labs  Lab 05/08/18  0455 05/07/18  0649 05/03/18  0530   WBC 4.4 4.9 5.5   RBC 2.90* 3.12* 3.40*   HGB 8.6* 9.5* 10.4*   HCT 28.3* 30.4* 33.4*   MCV 98 97 98   MCH 29.7 30.4 30.6   MCHC 30.4* 31.3* 31.1*   RDW 15.5* 15.4* 15.2*    222 226       INR    Recent Labs  Lab 05/07/18  0649   INR 1.20*       Time/Communication  I personally spent a total of 25 minutes. Of that 15 minutes was counseling/coordination of patient's care. Plan of care discussed with patient. See my note above for details.    Patient discussed with Dr. Austin.      Ramya Suh, FRANK, NP-C  Advanced Heart Failure/Cardiology II Service  Pager [phone]

## 2018-05-09 NOTE — PLAN OF CARE
Problem: Patient Care Overview  Goal: Plan of Care/Patient Progress Review  Outcome: No Change    D: Pt with hx ICM undergoing heart/kidney transplant w/u.   I/A: Dobutamine gtt at 2.5 mcg/kg/min. 's-110's with BBB. VSS. BG monitored. Sleeping with 2L NC for comfort. Pt independent with cares. Aneuric (states he voids dribbles with BM's). HD T,TH,Sat.   P: Continue transplant w/u. Monitor and assess pt condition and contact treatment team with questions or concerns.

## 2018-05-10 ENCOUNTER — CARE COORDINATION (OUTPATIENT)
Dept: GASTROENTEROLOGY | Facility: CLINIC | Age: 63
End: 2018-05-10

## 2018-05-10 LAB
ANION GAP SERPL CALCULATED.3IONS-SCNC: 8 MMOL/L (ref 3–14)
BUN SERPL-MCNC: 26 MG/DL (ref 7–30)
CALCIUM SERPL-MCNC: 9.2 MG/DL (ref 8.5–10.1)
CHLORIDE SERPL-SCNC: 97 MMOL/L (ref 94–109)
CO2 SERPL-SCNC: 30 MMOL/L (ref 20–32)
COPATH REPORT: NORMAL
CREAT SERPL-MCNC: 7.6 MG/DL (ref 0.66–1.25)
ERYTHROCYTE [DISTWIDTH] IN BLOOD BY AUTOMATED COUNT: 15.2 % (ref 10–15)
GFR SERPL CREATININE-BSD FRML MDRD: 7 ML/MIN/1.7M2
GLUCOSE BLDC GLUCOMTR-MCNC: 144 MG/DL (ref 70–99)
GLUCOSE BLDC GLUCOMTR-MCNC: 146 MG/DL (ref 70–99)
GLUCOSE BLDC GLUCOMTR-MCNC: 153 MG/DL (ref 70–99)
GLUCOSE BLDC GLUCOMTR-MCNC: 162 MG/DL (ref 70–99)
GLUCOSE BLDC GLUCOMTR-MCNC: 227 MG/DL (ref 70–99)
GLUCOSE SERPL-MCNC: 160 MG/DL (ref 70–99)
HCT VFR BLD AUTO: 29.4 % (ref 40–53)
HGB BLD-MCNC: 9.4 G/DL (ref 13.3–17.7)
MAGNESIUM SERPL-MCNC: 1.6 MG/DL (ref 1.6–2.3)
MCH RBC QN AUTO: 30.4 PG (ref 26.5–33)
MCHC RBC AUTO-ENTMCNC: 32 G/DL (ref 31.5–36.5)
MCV RBC AUTO: 95 FL (ref 78–100)
PLATELET # BLD AUTO: 258 10E9/L (ref 150–450)
POTASSIUM SERPL-SCNC: 4.6 MMOL/L (ref 3.4–5.3)
RBC # BLD AUTO: 3.09 10E12/L (ref 4.4–5.9)
SODIUM SERPL-SCNC: 135 MMOL/L (ref 133–144)
WBC # BLD AUTO: 6 10E9/L (ref 4–11)

## 2018-05-10 PROCEDURE — 25000132 ZZH RX MED GY IP 250 OP 250 PS 637: Performed by: INTERNAL MEDICINE

## 2018-05-10 PROCEDURE — 85027 COMPLETE CBC AUTOMATED: CPT | Performed by: NURSE PRACTITIONER

## 2018-05-10 PROCEDURE — 25000125 ZZHC RX 250: Performed by: NURSE PRACTITIONER

## 2018-05-10 PROCEDURE — 40000275 ZZH STATISTIC RCP TIME EA 10 MIN

## 2018-05-10 PROCEDURE — 80048 BASIC METABOLIC PNL TOTAL CA: CPT | Performed by: NURSE PRACTITIONER

## 2018-05-10 PROCEDURE — 94640 AIRWAY INHALATION TREATMENT: CPT | Mod: 76

## 2018-05-10 PROCEDURE — 63400005 ZZH RX 634: Performed by: INTERNAL MEDICINE

## 2018-05-10 PROCEDURE — 00000146 ZZHCL STATISTIC GLUCOSE BY METER IP

## 2018-05-10 PROCEDURE — 21400006 ZZH R&B CCU INTERMEDIATE UMMC

## 2018-05-10 PROCEDURE — 25000125 ZZHC RX 250: Performed by: STUDENT IN AN ORGANIZED HEALTH CARE EDUCATION/TRAINING PROGRAM

## 2018-05-10 PROCEDURE — 25000132 ZZH RX MED GY IP 250 OP 250 PS 637: Mod: GY | Performed by: NURSE PRACTITIONER

## 2018-05-10 PROCEDURE — 25000132 ZZH RX MED GY IP 250 OP 250 PS 637: Mod: GY | Performed by: STUDENT IN AN ORGANIZED HEALTH CARE EDUCATION/TRAINING PROGRAM

## 2018-05-10 PROCEDURE — 83735 ASSAY OF MAGNESIUM: CPT | Performed by: NURSE PRACTITIONER

## 2018-05-10 PROCEDURE — 25000132 ZZH RX MED GY IP 250 OP 250 PS 637: Performed by: NURSE PRACTITIONER

## 2018-05-10 PROCEDURE — A9270 NON-COVERED ITEM OR SERVICE: HCPCS | Mod: GY | Performed by: INTERNAL MEDICINE

## 2018-05-10 PROCEDURE — 25000128 H RX IP 250 OP 636: Performed by: INTERNAL MEDICINE

## 2018-05-10 PROCEDURE — 25000128 H RX IP 250 OP 636: Performed by: NURSE PRACTITIONER

## 2018-05-10 PROCEDURE — 99232 SBSQ HOSP IP/OBS MODERATE 35: CPT | Performed by: NURSE PRACTITIONER

## 2018-05-10 PROCEDURE — A9270 NON-COVERED ITEM OR SERVICE: HCPCS | Mod: GY | Performed by: NURSE PRACTITIONER

## 2018-05-10 PROCEDURE — 90937 HEMODIALYSIS REPEATED EVAL: CPT

## 2018-05-10 PROCEDURE — A9270 NON-COVERED ITEM OR SERVICE: HCPCS | Mod: GY | Performed by: STUDENT IN AN ORGANIZED HEALTH CARE EDUCATION/TRAINING PROGRAM

## 2018-05-10 RX ORDER — HEPARIN SODIUM 1000 [USP'U]/ML
500 INJECTION, SOLUTION INTRAVENOUS; SUBCUTANEOUS
Status: COMPLETED | OUTPATIENT
Start: 2018-05-10 | End: 2018-05-10

## 2018-05-10 RX ORDER — HEPARIN SODIUM 1000 [USP'U]/ML
500 INJECTION, SOLUTION INTRAVENOUS; SUBCUTANEOUS CONTINUOUS
Status: DISCONTINUED | OUTPATIENT
Start: 2018-05-10 | End: 2018-05-10

## 2018-05-10 RX ORDER — DOXERCALCIFEROL 4 UG/2ML
0.5 INJECTION INTRAVENOUS
Status: COMPLETED | OUTPATIENT
Start: 2018-05-10 | End: 2018-05-10

## 2018-05-10 RX ADMIN — INSULIN ASPART 1 UNITS: 100 INJECTION, SOLUTION INTRAVENOUS; SUBCUTANEOUS at 18:41

## 2018-05-10 RX ADMIN — HEPARIN SODIUM 500 UNITS: 1000 INJECTION, SOLUTION INTRAVENOUS; SUBCUTANEOUS at 08:42

## 2018-05-10 RX ADMIN — ALBUTEROL SULFATE 2.5 MG: 2.5 SOLUTION RESPIRATORY (INHALATION) at 21:53

## 2018-05-10 RX ADMIN — VITAMIN B12 0.1 MG ORAL TABLET 100 MCG: 0.1 TABLET ORAL at 07:34

## 2018-05-10 RX ADMIN — SIMETHICONE CHEW TAB 80 MG 80 MG: 80 TABLET ORAL at 07:41

## 2018-05-10 RX ADMIN — SODIUM CHLORIDE 300 ML: 9 INJECTION, SOLUTION INTRAVENOUS at 08:42

## 2018-05-10 RX ADMIN — INSULIN ASPART 1 UNITS: 100 INJECTION, SOLUTION INTRAVENOUS; SUBCUTANEOUS at 07:34

## 2018-05-10 RX ADMIN — DOXYCYCLINE HYCLATE 100 MG: 100 CAPSULE ORAL at 20:45

## 2018-05-10 RX ADMIN — OMEPRAZOLE 20 MG: 20 CAPSULE, DELAYED RELEASE ORAL at 20:45

## 2018-05-10 RX ADMIN — SODIUM CHLORIDE 250 ML: 9 INJECTION, SOLUTION INTRAVENOUS at 08:42

## 2018-05-10 RX ADMIN — ALLOPURINOL 100 MG: 100 TABLET ORAL at 07:34

## 2018-05-10 RX ADMIN — CALCIUM ACETATE 667 MG: 667 CAPSULE ORAL at 12:49

## 2018-05-10 RX ADMIN — SENNOSIDES AND DOCUSATE SODIUM 1 TABLET: 8.6; 5 TABLET ORAL at 07:34

## 2018-05-10 RX ADMIN — HEPARIN SODIUM 500 UNITS/HR: 1000 INJECTION, SOLUTION INTRAVENOUS; SUBCUTANEOUS at 08:42

## 2018-05-10 RX ADMIN — CALCIUM ACETATE 667 MG: 667 CAPSULE ORAL at 18:41

## 2018-05-10 RX ADMIN — CEFTRIAXONE 1 G: 1 INJECTION, POWDER, FOR SOLUTION INTRAMUSCULAR; INTRAVENOUS at 12:48

## 2018-05-10 RX ADMIN — DOXERCALCIFEROL 0.5 MCG: 4 INJECTION, SOLUTION INTRAVENOUS at 09:19

## 2018-05-10 RX ADMIN — CETIRIZINE HYDROCHLORIDE 10 MG: 10 TABLET, FILM COATED ORAL at 07:34

## 2018-05-10 RX ADMIN — DOXYCYCLINE HYCLATE 100 MG: 100 CAPSULE ORAL at 07:34

## 2018-05-10 RX ADMIN — SENNOSIDES AND DOCUSATE SODIUM 1 TABLET: 8.6; 5 TABLET ORAL at 20:45

## 2018-05-10 RX ADMIN — CALCIUM ACETATE 667 MG: 667 CAPSULE ORAL at 07:34

## 2018-05-10 RX ADMIN — FLUTICASONE PROPIONATE 2 SPRAY: 50 SPRAY, METERED NASAL at 20:46

## 2018-05-10 RX ADMIN — EPOETIN ALFA 4000 UNITS: 10000 SOLUTION INTRAVENOUS; SUBCUTANEOUS at 09:19

## 2018-05-10 RX ADMIN — SIMETHICONE CHEW TAB 80 MG 80 MG: 80 TABLET ORAL at 18:46

## 2018-05-10 RX ADMIN — ATORVASTATIN CALCIUM 40 MG: 40 TABLET, FILM COATED ORAL at 20:45

## 2018-05-10 RX ADMIN — Medication 1 CAPSULE: at 07:34

## 2018-05-10 RX ADMIN — Medication 2 G: at 13:37

## 2018-05-10 RX ADMIN — INSULIN ASPART 1 UNITS: 100 INJECTION, SOLUTION INTRAVENOUS; SUBCUTANEOUS at 12:56

## 2018-05-10 NOTE — PLAN OF CARE
D:pt talked about his life journey  I:listened and validated pt's Journey  A:pt appreciative of the conversation  P:[facility]

## 2018-05-10 NOTE — PROGRESS NOTES
HEMODIALYSIS TREATMENT NOTE    Date: 5/10/2018  Time: 1:07 PM    Data:  Pre Wt: 79.3 kg (174 lb 13.2 oz)   Desired Wt: 77 kg   Weight change: - 2.3 kg  Ultrafiltration - Post Run Net Total Removed (mL): 2300 mL  Ultrafiltration - Post Run Net Total Gain (mL): 0 mL  Vascular Access Status: Yes, secured and visible  Dialyzer Rinse: Streaked, Light  Total Blood Volume Processed: 75.5 Liters  Total Dialysis (Treatment) Time: 3.5 hrs      Lab:   Magnesium, BMP, CBC labs drawn    Interventions/Assessment:  Stable 3.5 hr HD tx via right CVC. Keep MAP > 65 and SBP > 95 per order. Hectorol 0.5 mcg and Epogen 4000 units given IV. 2000 units total heparin given during tx. Pt had no complaints or issues. 2.3 kg removed to achieve EDW of 77 kg. CVC heparin locked and report given to primary RN.      Plan:    Next HD tx per renal team.

## 2018-05-10 NOTE — PROGRESS NOTES
"  [facility]   Cardiology II Service / Advanced Heart Failure  Daily Progress Note      Patient: Murray Nicholson  MRN: [id number]  Admission Date: 4/16/2018  Hospital Day # 24    Assessment and Plan: Murray is a 63 year old male with a PMH of CAD, ICM (EF of 15-20%), ESRD, bicuspid aortic valve, severe MR, and proximal AAA who was admitted for decompensated heart failure. He is currently being worked up for heart/kidney transplant and remains on dobutamine 2.5 mcg/kg/min.       Today's Plan:   - no medical changes today, HD today  - plan to discuss transpalnt candidacy at meeting tomorrow     # Chronic systolic heart failure secondary to ICM  # Severe mitral regurgitation  # Severe holodiastolic aortic insufficiency   Stage D  NYHA Class III    Fluid status: euvolemic, on HD  Inotropy: continue dobutamine 2.5 mcg/kg/min  ACEi/ARB: contraindicated due to renal dysfunction  BB: contraindicated due to hypotension/currently on inotropes  Aldosterone antagonist: contraindicated due to renal dysfunction  SCD prophylaxis: none, defer given potential for transplant listing  Sleep apnea: yes, unable to tolerate CPAP  *Transplant workup completed: LVAD backup option for MVR and AVR no longer being considered, plan to discuss at interdisciplinary meeting on 5/11      # Multiple benign branch duct-IPMNs  MR of abdomen 4/16/18 : multiple cystic lesions in the pancreas, compatible with IPMNs. The largest of these measures 2.0 cm in maximum dimension. No main duct dilation or suspicious nodularity or septation. S/p EUS with FNA 5/7 - cytology consistent with benign IPMN.  -GI consulted: \"traditional these have a low rate of transformation, a small but relevant portion of patients will progress to developing high-risk lesions. Risk of transformation correlated to rate of growth, overall size, associated solid lesions (including mural nodules) and main duct involvement. Currently, he has none of these concerns. However, the " fact that many of these lesions take years to become worrisome suggests that surveillance in these patients is a long-term proposition. That said, what has been demonstrated thus far should not detrimentally impact his transplant candidacy.  -GI recommended three days of cipro, QTc prolonged so opted for three days of doxy plus rocephin for biliary coverage 5/7-5/9. Per procedure note, received cipro 400 mg IV intraop.      # NSVT  Monitor on tele, likely exacerbated by dobutamine. Keep K>4 Mg>2. 10 beat run 5/7, asymptomatic. Note QTc is prolonged, avoid prolonging agents.     # Seborrheic keratoses  # Dermatofibromas  # Multiple benign nevi  # Nummular dermatitis   Dermatology consulted per renal request for suspicious lesions prior to transplant listing, lesions all felt to be benign, no need for further testing/considered low risk     # Gas pains, improved  Regular BMs per patient.   - simethicone prn     Chronic Medical Issues:  # Hx of allergic rhinitis: Continue cetrizine 10 mg daily.   # ESRD: Dialysis on T, Th, Sa   # DM II: Last Hemoglobin A1C 4/4/18  6.3. Home meds: Glipizide 5 mg daily. Continue high intensity SS insulin. BGs 120-200 mostly.   # CAD: Coronary angiogram 2/8/17 showed no obstructive CAD.  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  Continue ASA 81 mg daily (held 5/8-5/10 after FNA) and atorvastatin 40 mg daily.   # Ascending aortic aneurysm: 4.5 x 4.5 cm proximal ascending aortic aneurysm seen on MRI 2/14. Recent echo 2/2/18 showed size of 4.2 cm. Continue to monitor with serial echos. BP control.      FEN: regular diet, 2L FR  PROPHY: ambulation  LINES: PICC  DISPO: TBD  CODE STATUS:  Full  ================================================================    Subjective/24-Hr Events:   Last 24 hr care team notes reviewed. Patient reports feeling fine, no complaints overnight. Slept well. Plan is for HD today. No complaints or shortness of breath, chest pain,  "lightheadedness, palpitations, abdominal pain. Working on setting up time for pre-transplant teaching with his sons.     ROS:  4 point ROS including respiratory, CV, GI and  (other than that noted in the HPI) is negative.     Medications: Reviewed in EPIC.     Physical Exam:   /74 (BP Location: Right arm)  Pulse 107  Temp 98.1  F (36.7  C) (Oral)  Resp 18  Ht 1.727 m (5' 8\")  Wt 79.3 kg (174 lb 14.4 oz)  SpO2 100%  BMI 26.59 kg/m2    GENERAL: Appears comfortable, in no distress.  HEENT: Eye symmetrical, no discharge or icterus bilaterally. Mucous membranes moist and without lesions.  NECK: Supple, JVD 2 cm above clavicle at 90 degrees.   CV: Mildly tachycardic, +S1S2, soft murmur across precordium, rub, or gallop.   RESPIRATORY: Respirations regular, even, and unlabored. Lungs CTA throughout.   GI: Soft and mildly distended with normoactive bowel sounds present in all quadrants. No tenderness, rebound, guarding.   EXTREMITIES: No peripheral edema. 2+ bilateral pedal pulses. Warm.   NEUROLOGIC: Alert and oriented x 3. No focal deficits.   MUSCULOSKELETAL: No joint swelling or tenderness.   SKIN: No jaundice. No rashes or lesions.     Labs:  CMP    Recent Labs  Lab 05/08/18  0455 05/07/18  0649 05/04/18  0645    135 136   POTASSIUM 4.1 4.0 4.2   CHLORIDE 97 97 95   CO2 31 30 31   ANIONGAP 8 8 10   * 123* 104*   BUN 25 21 12   CR 8.99* 7.51* 5.86*   GFRESTIMATED 6* 7* 10*   GFRESTBLACK 7* 9* 12*   TRINI 9.0 9.1 8.9   MAG 2.0 1.6  --        CBC    Recent Labs  Lab 05/08/18  0455 05/07/18  0649   WBC 4.4 4.9   RBC 2.90* 3.12*   HGB 8.6* 9.5*   HCT 28.3* 30.4*   MCV 98 97   MCH 29.7 30.4   MCHC 30.4* 31.3*   RDW 15.5* 15.4*    222       INR    Recent Labs  Lab 05/07/18  0649   INR 1.20*       Time/Communication  I personally spent a total of 25 minutes. Of that 15 minutes was counseling/coordination of patient's care. Plan of care discussed with patient. See my note above for " details.    Patient discussed with Dr. Austin.      Ramya Suh DNP, NP-C  Advanced Heart Failure/Cardiology II Service  Pager [phone]

## 2018-05-10 NOTE — PLAN OF CARE
Problem: Patient Care Overview  Goal: Plan of Care/Patient Progress Review  Outcome: No Change  D: Pt admitted 4/16 for heart/kidney txp w/u.  I: Monitored vitals and assessed pt status. Running: dobutamine gtt 2.5mcg/kg/min (2.9mL/hr)  A: A0x4. VSS on 2LPM via NC overnight for comfort. ST on tele, HR 100s-110s. Afebrile. Denies pain, SOB, dizziness, nausea. Anuric on HD, next run planned this AM (T/Th/Sa). Tunneled IJ for HD. Labs to be drawn during HD. Up ind. Pt slept between cares, making needs known.  P: Continue to monitor pt status and report changes/concerns to Cards 2.

## 2018-05-10 NOTE — PROGRESS NOTES
Post EUS (5/10/2018) with Dr. Don: Follow-up    Post procedure recommendations: If fluid returns consistent with IPMN then surveillance by noninvasive imaging (MRI in 1 year)   until imaging has concerning finding; if fluid returns consistent with pseudocyst then no further surveillance required     Inpatient Panc/Bili team to follow: Will follow outpatient as appropriate.    Orders placed: None at this time.     Aracelis RAYGOZA RN Coordinator  Dr. Eaton, Dr. Don & Dr. Wang  Advanced Endoscopy  [phone]

## 2018-05-10 NOTE — PLAN OF CARE
Problem: Cardiac: Heart Failure (Adult)  Goal: Signs and Symptoms of Listed Potential Problems Will be Absent, Minimized or Managed (Cardiac: Heart Failure)  Signs and symptoms of listed potential problems will be absent, minimized or managed by discharge/transition of care (reference Cardiac: Heart Failure (Adult) CPG).   Outcome: No Change  Pt VSS; SR/ST with HR 90-100s; RA with sats in the upper 90s. No complaints of pain or discomfort. Simethicone PRN for cramping with relief. Dobutamine gtt continues at 2.5 mcg/kg/min. Completed HD run this AM. Up ad edith in room; anuric. BS covered with SSI per parameters. Magnesium replaced per protocol. Plan to potentially get listed for Tx and for SWAN placement tomorrow 5/11. Continue to monitor and notify team with changes.

## 2018-05-10 NOTE — PROGRESS NOTES
"  Nephrology Progress Note  05/10/2018         Assessment & Recommendations:   Murray Nicholson is a 63 yr old male with PMH of HTN, DMII, functionally bicuspid aortic valve, CHF/CM (EF 10-15%), ESRD, admitted with worsening dyspnea/SOB, now on dobutamine drip and being evaluated for heart/kidney transplant.      ESKD: due to DMII, on PD since Aug 2017, changed to iHD 1/2018 due to polymicrobial and fungal peritonitis, now on TTS schedule at [facility] under care of Dr Mcpherson. Access: tunneled RIJ (replaced 4/17/18). Run time: 4 hrs; EDW 77 kg.  - Dialysis per TTS schedule   - Using low dose heparin on HD  - Heparin lock CVC          Volume: 77 kg, RHC 5/1 (done at 77 kg): RA 3, PCW 15  - Daily standing weights please     Severe AV and MR insufficiency:   BP/congestive CM (EF 10-15%); minimal CAD per angio on 2/8/17. Chronically hypotensive with tachycardia  - Goal MAP > 65 and SBP > 95 while dialyzing  - On dobutamine 2.5 mcg/kg/min (started 4/20/18)  - Being evaluated for heart/kidney tx          Anemia: hgb 8.6, down-drifting; labs 5/8: ferritin 621, IS 27, iron 58; on maintenance venofer 50 mg qTuesday   - Continue venofer  - Will restart epogen at 4000 units per HD      BMD: calcium 9.0, alb 3.5, phos 3.2; recent ; Vit D 27; on hectorol 0.5 mcg via HD; on phoslo 1 tab TID with meals.   - Continue hectorol via HD  - Continue Phoslo       Recommendations were communicated to primary team via this note.       FANTA GarciaC  Division of Kidney Disease  [phone]    Interval History :   Seen on dialysis, UF to dry weight, stable run.  Denies CP, SOB, chills, n/v.    Review of Systems:   4 point ROS negative other than as noted above.    Physical Exam:   I/O last 3 completed shifts:  In: 669.6 [P.O.:600; I.V.:69.6]  Out: -    /76  Pulse 107  Temp 98.1  F (36.7  C) (Oral)  Resp 18  Ht 1.727 m (5' 8\")  Wt 79.3 kg (174 lb 14.4 oz)  SpO2 97%  BMI 26.59 kg/m2     GENERAL APPEARANCE: alert, " NAD  EYES: no scleral icterus, pupils equal   Pulmonary: lungs clear to auscultation with equal breath sounds bilaterally   CV: regular rhythm, tachycardia at baseline   - Edema trace  GI: soft, nontender, normal bowel sounds  MS: no evidence of inflammation in joints, no muscle tenderness  : no doyle  SKIN: no rash, warm, dry, no cyanosis  NEURO: face symmetric, no asterixis   Access: tunneled [facility]       Labs:   All labs reviewed by me  Electrolytes/Renal -   Recent Labs   Lab Test  05/08/18   0455  05/07/18   0649  05/04/18   0645  05/03/18   0530   05/01/18   0930   04/21/18   0640   04/16/18   1546   NA  137  135  136  138   < >  133   < >  135   < >  135   POTASSIUM  4.1  4.0  4.2  4.5   < >  5.0   < >  4.0   < >  5.0   CHLORIDE  97  97  95  96   < >  94   < >  96   < >  101   CO2  31  30  31  32   < >  31   < >  24   < >  18*   BUN  25  21  12  21   < >  36*   < >  41*   < >  63*   CR  8.99*  7.51*  5.86*  7.36*   < >  8.51*   < >  7.72*   < >  8.81*   GLC  127*  123*  104*  109*   < >  100*   < >  111*   < >  253*   TRINI  9.0  9.1  8.9  9.6   < >  9.7   < >  9.2   < >  9.6   MAG  2.0  1.6   --   2.4*   < >  2.4*   < >  1.6   < >  1.9   PHOS   --    --    --    --    --   3.2   --   5.4*   --   6.4*    < > = values in this interval not displayed.       CBC -   Recent Labs   Lab Test  05/08/18   0455  05/07/18   0649  05/03/18   0530   WBC  4.4  4.9  5.5   HGB  8.6*  9.5*  10.4*   PLT  207  222  226       LFTs -   Recent Labs   Lab Test  04/16/18   1546  03/07/18   0833  02/19/18   1558  02/02/18   1736   ALKPHOS  123  129  102  86   BILITOTAL  0.8  0.7  0.6  0.4   ALT  22  21  15  16   AST  17  18  18  20   PROTTOTAL  7.4   --   7.2  6.2*   ALBUMIN  3.5   --   2.7*  2.1*       Iron Panel -   Recent Labs   Lab Test  05/08/18   0954  07/19/17   1306  07/05/17   1204   IRON  58  46  26*   IRONSAT  27  18  12*   ALBERTINA  621*  369  542*         Imaging:  Reviewed    Current Medications:    albuterol  2.5 mg  Nebulization At Bedtime     allopurinol  100 mg Oral Daily     [START ON 5/11/2018] aspirin  81 mg Oral Daily     atorvastatin  40 mg Oral Daily     calcium acetate  667 mg Oral TID w/meals     cefTRIAXone  1 g Intravenous Q24H     cetirizine  10 mg Oral Daily     cyanocolbalamin  100 mcg Oral Daily     doxercalciferol  0.5 mcg Intravenous Once in dialysis     doxycycline  100 mg Oral Q12H JEAN     epoetin emily (EPOGEN,PROCRIT) inj ESRD  4,000 Units Intravenous Once in dialysis     fluticasone  1-2 spray Both Nostrils Daily     gelatin absorbable  1 each Topical During Hemodialysis (from stock)     heparin  3 mL Intracatheter During Hemodialysis (from stock)     heparin  3 mL Intracatheter During Hemodialysis (from stock)     insulin aspart  1-10 Units Subcutaneous TID AC     insulin aspart  1-7 Units Subcutaneous At Bedtime     NEPHROCAPS  1 capsule Oral Daily     omeprazole  20 mg Oral Daily     polyethylene glycol  17 g Oral Daily     senna-docusate  1 tablet Oral BID     sodium chloride (PF)  10 mL Intracatheter Q7 Days     sodium chloride (PF)  3 mL Intracatheter Q8H     sodium chloride (PF)  3 mL Intracatheter During Hemodialysis (from stock)     sodium chloride (PF)  3 mL Intracatheter During Hemodialysis (from stock)       DOBUTamine 2.5 mcg/kg/min (05/10/18 0742)     heparin (porcine) 500 Units/hr (05/08/18 0900)     heparin (porcine) 500 Units/hr (05/08/18 0900)     heparin (porcine) 500 Units/hr (05/10/18 0842)     - MEDICATION INSTRUCTIONS -       Reason ACE/ARB/ARNI order not selected       Reason beta blocker order not selected       Kristi Armas PA-C

## 2018-05-11 ENCOUNTER — DOCUMENTATION ONLY (OUTPATIENT)
Dept: TRANSPLANT | Facility: CLINIC | Age: 63
End: 2018-05-11

## 2018-05-11 LAB
GLUCOSE BLDC GLUCOMTR-MCNC: 145 MG/DL (ref 70–99)
GLUCOSE BLDC GLUCOMTR-MCNC: 148 MG/DL (ref 70–99)
GLUCOSE BLDC GLUCOMTR-MCNC: 166 MG/DL (ref 70–99)
GLUCOSE BLDC GLUCOMTR-MCNC: 177 MG/DL (ref 70–99)
GLUCOSE BLDC GLUCOMTR-MCNC: 230 MG/DL (ref 70–99)
GLUCOSE BLDC GLUCOMTR-MCNC: 251 MG/DL (ref 70–99)
MAGNESIUM SERPL-MCNC: 2 MG/DL (ref 1.6–2.3)

## 2018-05-11 PROCEDURE — A9270 NON-COVERED ITEM OR SERVICE: HCPCS | Mod: GY | Performed by: STUDENT IN AN ORGANIZED HEALTH CARE EDUCATION/TRAINING PROGRAM

## 2018-05-11 PROCEDURE — A9270 NON-COVERED ITEM OR SERVICE: HCPCS | Mod: GY | Performed by: INTERNAL MEDICINE

## 2018-05-11 PROCEDURE — 25000131 ZZH RX MED GY IP 250 OP 636 PS 637: Performed by: NURSE PRACTITIONER

## 2018-05-11 PROCEDURE — 25000132 ZZH RX MED GY IP 250 OP 250 PS 637: Mod: GY | Performed by: NURSE PRACTITIONER

## 2018-05-11 PROCEDURE — 83735 ASSAY OF MAGNESIUM: CPT | Performed by: NURSE PRACTITIONER

## 2018-05-11 PROCEDURE — 36415 COLL VENOUS BLD VENIPUNCTURE: CPT | Performed by: NURSE PRACTITIONER

## 2018-05-11 PROCEDURE — 40000275 ZZH STATISTIC RCP TIME EA 10 MIN

## 2018-05-11 PROCEDURE — 40000133 ZZH STATISTIC OT WARD VISIT: Performed by: OCCUPATIONAL THERAPIST

## 2018-05-11 PROCEDURE — 21400006 ZZH R&B CCU INTERMEDIATE UMMC

## 2018-05-11 PROCEDURE — 99232 SBSQ HOSP IP/OBS MODERATE 35: CPT | Performed by: NURSE PRACTITIONER

## 2018-05-11 PROCEDURE — 25000125 ZZHC RX 250: Performed by: STUDENT IN AN ORGANIZED HEALTH CARE EDUCATION/TRAINING PROGRAM

## 2018-05-11 PROCEDURE — A9270 NON-COVERED ITEM OR SERVICE: HCPCS | Mod: GY | Performed by: NURSE PRACTITIONER

## 2018-05-11 PROCEDURE — 25000132 ZZH RX MED GY IP 250 OP 250 PS 637: Performed by: STUDENT IN AN ORGANIZED HEALTH CARE EDUCATION/TRAINING PROGRAM

## 2018-05-11 PROCEDURE — 25000128 H RX IP 250 OP 636: Performed by: INTERNAL MEDICINE

## 2018-05-11 PROCEDURE — 00000146 ZZHCL STATISTIC GLUCOSE BY METER IP

## 2018-05-11 PROCEDURE — 94640 AIRWAY INHALATION TREATMENT: CPT

## 2018-05-11 PROCEDURE — 25000132 ZZH RX MED GY IP 250 OP 250 PS 637: Performed by: NURSE PRACTITIONER

## 2018-05-11 PROCEDURE — 40000558 ZZH STATISTIC CVC DRESSING CHANGE

## 2018-05-11 PROCEDURE — 97110 THERAPEUTIC EXERCISES: CPT | Mod: GO | Performed by: OCCUPATIONAL THERAPIST

## 2018-05-11 PROCEDURE — 25000132 ZZH RX MED GY IP 250 OP 250 PS 637: Mod: GY | Performed by: INTERNAL MEDICINE

## 2018-05-11 PROCEDURE — 25000125 ZZHC RX 250: Performed by: NURSE PRACTITIONER

## 2018-05-11 PROCEDURE — 25000132 ZZH RX MED GY IP 250 OP 250 PS 637: Mod: GY | Performed by: STUDENT IN AN ORGANIZED HEALTH CARE EDUCATION/TRAINING PROGRAM

## 2018-05-11 RX ORDER — SENNOSIDES 8.6 MG
8.6 TABLET ORAL 2 TIMES DAILY PRN
Status: DISCONTINUED | OUTPATIENT
Start: 2018-05-11 | End: 2018-06-05

## 2018-05-11 RX ORDER — LORAZEPAM 0.5 MG/1
0.5 TABLET ORAL
Status: DISCONTINUED | OUTPATIENT
Start: 2018-05-11 | End: 2018-06-15

## 2018-05-11 RX ORDER — LORAZEPAM 0.5 MG/1
0.5 TABLET ORAL ONCE
Status: COMPLETED | OUTPATIENT
Start: 2018-05-11 | End: 2018-05-11

## 2018-05-11 RX ADMIN — LORAZEPAM 0.5 MG: 0.5 TABLET ORAL at 22:28

## 2018-05-11 RX ADMIN — INSULIN ASPART 2 UNITS: 100 INJECTION, SOLUTION INTRAVENOUS; SUBCUTANEOUS at 13:46

## 2018-05-11 RX ADMIN — SENNOSIDES AND DOCUSATE SODIUM 1 TABLET: 8.6; 5 TABLET ORAL at 21:12

## 2018-05-11 RX ADMIN — CALCIUM ACETATE 667 MG: 667 CAPSULE ORAL at 18:41

## 2018-05-11 RX ADMIN — Medication 1 CAPSULE: at 08:35

## 2018-05-11 RX ADMIN — FLUTICASONE PROPIONATE 2 SPRAY: 50 SPRAY, METERED NASAL at 21:13

## 2018-05-11 RX ADMIN — LORAZEPAM 0.5 MG: 0.5 TABLET ORAL at 03:03

## 2018-05-11 RX ADMIN — INSULIN GLARGINE 16 UNITS: 100 INJECTION, SOLUTION SUBCUTANEOUS at 21:13

## 2018-05-11 RX ADMIN — CETIRIZINE HYDROCHLORIDE 10 MG: 10 TABLET, FILM COATED ORAL at 08:35

## 2018-05-11 RX ADMIN — CALCIUM ACETATE 667 MG: 667 CAPSULE ORAL at 08:35

## 2018-05-11 RX ADMIN — INSULIN ASPART 5 UNITS: 100 INJECTION, SOLUTION INTRAVENOUS; SUBCUTANEOUS at 18:41

## 2018-05-11 RX ADMIN — ATORVASTATIN CALCIUM 40 MG: 40 TABLET, FILM COATED ORAL at 21:13

## 2018-05-11 RX ADMIN — ALBUTEROL SULFATE 2.5 MG: 2.5 SOLUTION RESPIRATORY (INHALATION) at 21:38

## 2018-05-11 RX ADMIN — ALLOPURINOL 100 MG: 100 TABLET ORAL at 08:36

## 2018-05-11 RX ADMIN — SENNOSIDES AND DOCUSATE SODIUM 1 TABLET: 8.6; 5 TABLET ORAL at 08:35

## 2018-05-11 RX ADMIN — INSULIN ASPART 1 UNITS: 100 INJECTION, SOLUTION INTRAVENOUS; SUBCUTANEOUS at 09:28

## 2018-05-11 RX ADMIN — OMEPRAZOLE 20 MG: 20 CAPSULE, DELAYED RELEASE ORAL at 21:13

## 2018-05-11 RX ADMIN — CALCIUM ACETATE 667 MG: 667 CAPSULE ORAL at 13:11

## 2018-05-11 RX ADMIN — ASPIRIN 81 MG CHEWABLE TABLET 81 MG: 81 TABLET CHEWABLE at 21:12

## 2018-05-11 RX ADMIN — VITAMIN B12 0.1 MG ORAL TABLET 100 MCG: 0.1 TABLET ORAL at 08:35

## 2018-05-11 RX ADMIN — ALTEPLASE 1 MG: 2.2 INJECTION, POWDER, LYOPHILIZED, FOR SOLUTION INTRAVENOUS at 18:42

## 2018-05-11 RX ADMIN — Medication 2 G: at 11:10

## 2018-05-11 NOTE — PROGRESS NOTES
"CLINICAL NUTRITION SERVICES - REASSESSMENT NOTE     Nutrition Prescription    RECOMMENDATIONS FOR MDs/PROVIDERS TO ORDER:  Rec thiamine (100 mg/day) if pt has NYHA Class III-IV heart failure.      Malnutrition Status:    Non-severe malnutrition in the context of chronic illness    Recommendations already ordered by Registered Dietitian (RD):  Oral supplements    Future/Additional Recommendations:  1. Continue current diet order, liberalized to help encourage oral intake. Monitor potential need for low-sodium diet and/or fluid restriction. In addition, monitor K+ trends and phos trends. Diet restrictions may be added as a \"Combination Diet.\"  2. Monitor glycemic control. DM II hx. Hgb A1c of 8.6 on 2/2/18 and then 7 on 4/26/18.  3. Vitamin D supplementation as per team. Pt currently on doxercalciferol and his vitamin D deficiency lab was 27 on 5/5/18 (improved from 17 on 4/9/17).  4. Continue phos binder as per team.   5. Consider checking vitamin B12 lab.   6. As ordered, continue nephrocaps.  7. Bowel regimen as per team. Last stool noted was on 5/7.  8. Consider scheduling antiemetics/antinauseants ~20 minutes prior to meals to help optimize nausea control.        EVALUATION OF THE PROGRESS TOWARD GOALS   Diet: Regular diet order and on a 2 L fluid restriction. Has a prn supplement order.  Intake: Good diet tolerance. Flowsheets indicate pt consuming 25-75% of meals with a good appetite 5/4, 50-75% of meals with a fair appetite 5/5, % of meals with a good appetite 5/6, and 100% of meals with a good appetite from 5/7-5/11. Per pt, states he consumes ~50% of his usual oral intake at times. Pt states he would consume about 75% of his usual oral intake if he was at home and if he was able to consume the foods he likes. He has been using portion control and tries to avoid overeating. Also, he attributes eating less in the hospital to not being as hungry when he gets his food, early satiety, and food choices. LOS " is likely a factor as well. Per pt, avoid beef with his gout and finds that his food is often cold when it arrives. Tries to eat chicken but states it is tough. One of his friends plans to bring him popcorn.        NEW FINDINGS   N/A    MALNUTRITION  % Intake: < 75% for > 7 days (non-severe)  % Weight Loss: Weight loss does not meet criteria. Also, difficult to assess with fluid status changes and dialysis  Subcutaneous Fat Loss: None observed  Muscle Loss: Temporal:  Mild and Thoracic region (clavicle, acromium bone, deltoid, trapezius, pectoral):  Mild  Fluid Accumulation/Edema: Does not meet criteria  Malnutrition Diagnosis: Non-severe malnutrition in the context of chronic illness    Previous Goals   Patient to consume % of nutritionally adequate meal trays TID, or the equivalent with supplements/snacks.  Evaluation: Not met    Previous Nutrition Diagnosis  Inadequate oral intake related to NPO status at times for tests/procedures (recent colonoscopy), N/V intermittent, and possible constipation at times as evidenced by pt consuming 50% of meals at times.    Evaluation: Unresolved. Updated below.     CURRENT NUTRITION DIAGNOSIS  Inadequate oral intake related to NPO status at times for tests/procedures), early satiety, LOS, and food preferences in the hospital as evidenced by pt consuming 50% of meals at times.      INTERVENTIONS  Implementation  Nutrition Education, Medical food supplement therapy: Placed one-time order requesting butter pecan Nepro. Discussed other oral supplements, including Gelatein Plus. Encouraged protein intake and gave examples.   Emailed  supervisors/managers and requested visit.     Goals  Patient to consume % of nutritionally adequate meal trays TID, or the equivalent with supplements/snacks.    Monitoring/Evaluation  Progress toward goals will be monitored and evaluated per protocol.     Nutrition will continue to follow.      Mary Silva, MS, RD, LD,  [address] Pgr: [phone]

## 2018-05-11 NOTE — PLAN OF CARE
ABO Verification - OS listing:  Mr Nicholson's cytology results cambe back negative from the pancreatic cysts, and this morning the Heart Transplant Team approved him to be listed for a heart transplant, with a kidney to follow from the same donor if possible.  Two ABOs were obtained from 7-24-15 and 4-16-18, both documenting blood type A.  After confirming the ABO with a second coordinator, Marcie Charles RN, Mr Ramires's name was added to the [facility] Heart Transplant Waiting List as a status 1B, due to the need for inotropes.  Per policy, the pt will also receive written documentation of the Heart and Kidney listing.

## 2018-05-11 NOTE — PROGRESS NOTES
Received phone call from Rm Carpenter heart transplant coordinator that I may place Murray on the active kidney and heart transplant list. His case was reviewed today 2018 by their heart team. Patient was added to the  donor kidney and heart transplant wait list as active today 2018. The listing letter will be sent collectively by the heart team. PRA will be managed by the heart team. Financials have been cleared by the heart team.Patient will be notified by the heart team as he is inpatient.

## 2018-05-11 NOTE — PLAN OF CARE
Problem: Patient Care Overview  Goal: Plan of Care/Patient Progress Review  Outcome: No Change  D: Pt admitted 4/16 with decompensated HF for heart/kidney txp w/u.  I: Monitored vitals and assessed pt status. Running: dobutamine 2.5 mcg/kg/min, PRN: lorazepam (one-time dose)  A: A0x4. VSS on 2LPM via NC for comfort. Pt had two brief episodes tachypnea, self-resolving and lasting less than one minute, no change in VS. Following episodes pt reported anxiety; MD notified and one-time dose lorazepam given with apparent relief as pt was able to sleep afterward. SR/ST on tele, HR 95-110s. Afebrile. Denies pain or dizziness and other SOB aside from episodes described. Anuric on HD, next run Sat (T/Th/Sat). Tunneled IJ for HD. Pt appeared to rest comfortably, making needs known.   P: Continue to monitor pt status and report changes/concerns to Cards 2.

## 2018-05-11 NOTE — PLAN OF CARE
Problem: Patient Care Overview  Goal: Plan of Care/Patient Progress Review  Outcome: No Change  Afebrile, Tele shows SR-ST with heart rate 100-110's. OVSS.  Alert & oriented x 4. On room air.  Dobutamine gtt continues at  2.5 mcg/kg/min (2.9 ml/hour)  2300 ml removed in Dialysis.  On regular diet= good po. No complaints of pain / nausea. Blood sugars covered per sliding scale.   Up independently in room and halls.

## 2018-05-11 NOTE — PLAN OF CARE
Problem: Patient Care Overview  Goal: Plan of Care/Patient Progress Review  Outcome: No Change  Neuro: A&Ox4.   Cardiac: -110. VSS w/soft BPs.   Respiratory: Sating 97% on RA.  GI/: Anuric 2/2 HD. BM X1  Diet/appetite: Tolerating reg diet 2L FR. Eating well.  Activity:  Up ad edith.  Pain: At acceptable level on current regimen.   Skin: No new deficits noted.  LDA's: Tunneled IJ port for HD, PICC infusing dobutamin gtt at 2.5mcg/kg/hr, second port SL but no blood return - need order for TPA.    Plan: Pt listed for kidney and heart transplant, status 1B. Per provider note, possible swan placement next week. Continue with POC. Notify primary team with changes.    Problem: Diabetes Comorbidity  Goal: Diabetes  Patient comorbidity will be monitored for signs and symptoms of hyperglycemia or hypoglycemia. Problems will be absent, minimized or managed by discharge/transition of care.   Outcome: No Change   and 177, covered w/SS insulin prior to meals.    Problem: Renal Insufficiency Comorbidity  Goal: Renal Insufficiency  Patient comorbidity will be monitored for signs and symptoms of Renal Insufficiency (Chronic) condition.  Problems will be absent, minimized or managed by discharge/transition of care.   Outcome: No Change  Creatinine 7.06

## 2018-05-11 NOTE — PLAN OF CARE
Problem: Patient Care Overview  Goal: Plan of Care/Patient Progress Review  OT/CR 6C:  Discharge Planner OT   Patient plan for discharge: Pt reports he anticipates transplant prior to discharge; pt received information today about being placed on the transplant list   Current status: Ambulated ~425 ft with IV pole and SBA; no rest breaks required. Completed UE calisthenic exercises while standing. Tolerated 15 minutes on nustep (workload 4) with normal cardiac response. Completed 2x10 reps total sit to stand from low surface chair, practicing with potential sternal precautions. VSS during session.   Barriers to return to prior living situation: medical status, anticipated transplant  Recommendations for discharge: Recommend discharge to home with OP CR pending functional status post surgically  Rationale for recommendations: Pt is independent with basic mobility and self cares though would benefit from continued therapy to increase activity tolerance and aerobic conditioning.    HENRIK Bob/L 5/11/18 1:36pm       Entered by: Harika Murray 05/11/2018 11:14 AM

## 2018-05-11 NOTE — PROGRESS NOTES
SPIRITUAL HEALTH SERVICES  SPIRITUAL ASSESSMENT Progress Note  [facility] 6C     REFERRAL SOURCE: pt had been seen previously by palliative  - palliative has signed off.    Pt welcomed spiritual support and conversation. In-depth conversation regarding pt's medical status, family history, his vocation as an actor, his own very supportive family.    PLAN: continue to follow, visiting at least weekly while pt on unit.    Navid Alonso M.Div (Bill)., [facility]  Staff   Pager [phone]

## 2018-05-11 NOTE — PROGRESS NOTES
"  [facility]   Cardiology II Service / Advanced Heart Failure  Daily Progress Note      Patient: Murray Nicholson  MRN: [id number]  Admission Date: 4/16/2018  Hospital Day # 25    Assessment and Plan: Murray is a 63 year old male with a PMH of CAD, ICM (EF of 15-20%), ESRD, bicuspid aortic valve with AI, severe MR, and proximal AAA who was admitted for decompensated heart failure. He is on dobutamine 2.5 mcg/kg/min and listed for heart/kidney transplant as status 1B.       Today's Plan:   - patient to be listed status 1B for heart/kidney transplant  - start lantus at HS, monitor BS     # Chronic systolic heart failure secondary to ICM  # Severe mitral regurgitation  # Severe holodiastolic aortic insufficiency   Stage D NYHA Class III    Fluid status: euvolemic, on HD  Inotropy: continue dobutamine 2.5 mcg/kg/min  ACEi/ARB: contraindicated due to renal dysfunction  BB: contraindicated due to hypotension/currently on inotropes  Aldosterone antagonist: contraindicated due to renal dysfunction  SCD prophylaxis: none, defer given potential for transplant listing  Sleep apnea: yes, unable to tolerate CPAP  *listed status 1B (due to dobutamine) for heart/kidney transplant, may transfer to ICU for swan-guided therapy next week       # Multiple benign branch duct-IPMNs  MR of abdomen 4/16/18 : multiple cystic lesions in the pancreas, compatible with IPMNs. No main duct dilation or suspicious nodularity or septation. S/p EUS with FNA 5/7 s/p three days of prophylactic abx per GI - cytology consistent with benign IPMN. GI consulted: \"traditional these have a low rate of transformation, a small but relevant portion of patients will progress to developing high-risk lesions. Risk of transformation correlated to rate of growth, overall size, associated solid lesions (including mural nodules) and main duct involvement. Currently, he has none of these concerns. However, the fact that many of these lesions take years to become " "worrisome suggests that surveillance in these patients is a long-term proposition. That said, what has been demonstrated thus far should not detrimentally impact his transplant candidacy.\"     # NSVT  Monitor on tele. Keep K>4 Mg>2. 10 beat run 5/7, asymptomatic. Note QTc is borderline prolonged, avoid prolonging agents.     # Seborrheic keratoses  # Dermatofibromas  # Multiple benign nevi  # Nummular dermatitis   Dermatology consulted per renal request for suspicious lesions prior to transplant listing, lesions all felt to be benign, no need for further testing/considered low risk     # DM II   Last A1C 4/4/18  6.3. PTA Glipizide 5 mg daily. BS more frequently in 200s lately.   - start lantus 16 units at HS  - continue high intensity SS insulin, consider meal coverage     Chronic Medical Issues:  # Hx of allergic rhinitis: Continue cetrizine 10 mg daily  # ESRD: Dialysis on T, Th, Sa   # CAD: Coronary angiogram 2/8/17 showed no obstructive CAD.  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 Continue ASA 81 mg daily and atorvastatin 40 mg daily.   # Ascending aortic aneurysm: 4.5 x 4.5 cm proximal ascending aortic aneurysm seen on MRI 2/14. Recent echo 2/2/18 showed size of 4.2 cm. BP controlled.       FEN: regular diet, 2L FR  PROPHY: ambulation  LINES: PICC  DISPO: TBD  CODE STATUS:  Full  ================================================================    Subjective/24-Hr Events:   Last 24 hr care team notes reviewed. Patient reports feeling fine this morning. Did have an episode of breathlessness overnight. He was sleepy flat and woke, then started to feel shortness of breath. No true orthopnea and shortness of breath is not what woke up. No cough. He received Ativan and this helped. Per patient, may have felt anxious around this time. No c/o fluid retention, no chest pain, palpitations.     ROS:  4 point ROS including respiratory, CV, GI and  (other than that noted in the HPI) is " "negative.     Medications: Reviewed in EPIC.     Physical Exam:   /74  Pulse 107  Temp 97.6  F (36.4  C) (Oral)  Resp 18  Ht 1.727 m (5' 8\")  Wt 79.3 kg (174 lb 14.4 oz)  SpO2 100%  BMI 26.59 kg/m2    GENERAL: Appears comfortable, in no distress.  HEENT: Eye symmetrical, no discharge or icterus bilaterally. Mucous membranes moist and without lesions.  NECK: Supple, JVD not visible at 90 degrees.   CV: Mildly tachycardic, +S1S2, soft murmur across precordium, rub, or gallop.   RESPIRATORY: Respirations regular, even, and unlabored. Lungs CTA throughout.   GI: Soft and mildly distended with normoactive bowel sounds present in all quadrants. No tenderness, rebound, guarding.   EXTREMITIES: No peripheral edema. 2+ bilateral pedal pulses. Warm.   NEUROLOGIC: Alert and oriented x 3. No focal deficits.   MUSCULOSKELETAL: No joint swelling or tenderness.   SKIN: No jaundice. No rashes or lesions.     Labs:  CMP    Recent Labs  Lab 05/10/18  0840 05/08/18  0455 05/07/18  0649    137 135   POTASSIUM 4.6 4.1 4.0   CHLORIDE 97 97 97   CO2 30 31 30   ANIONGAP 8 8 8   * 127* 123*   BUN 26 25 21   CR 7.60* 8.99* 7.51*   GFRESTIMATED 7* 6* 7*   GFRESTBLACK 9* 7* 9*   TRINI 9.2 9.0 9.1   MAG 1.6 2.0 1.6       CBC    Recent Labs  Lab 05/10/18  0840 05/08/18  0455 05/07/18  0649   WBC 6.0 4.4 4.9   RBC 3.09* 2.90* 3.12*   HGB 9.4* 8.6* 9.5*   HCT 29.4* 28.3* 30.4*   MCV 95 98 97   MCH 30.4 29.7 30.4   MCHC 32.0 30.4* 31.3*   RDW 15.2* 15.5* 15.4*    207 222       INR    Recent Labs  Lab 05/07/18  0649   INR 1.20*       Time/Communication  I personally spent a total of 25 minutes. Of that 15 minutes was counseling/coordination of patient's care. Plan of care discussed with patient. See my note above for details.    Patient discussed with Dr. Ernst.      Ramya Suh DNP, NP-C  Advanced Heart Failure/Cardiology II Service  Pager [phone]        "

## 2018-05-11 NOTE — PROGRESS NOTES
Care Coordinator Progress Note     Admission Date/Time:  4/16/2018  Attending MD:  Bobby Muse MD     Data  Chart reviewed, discussed with interdisciplinary team.   Patient was admitted for:    Chronic systolic heart failure (H)  End stage renal disease (H).     Full assessment completed in previous note.  Concerns with insurance coverage for discharge needs: TBD  Current Living Situation: Patient lives alone.  Support System: Supportive and Involved sons.   Services Involved: [facility]   Transportation at Discharge: TBD  Transportation to Medical Appointments: TBD  Barriers to Discharge: Chronic Illness     Per NP, pt is now listed status 1B for heart/kidney transplant with plan for possible swan placement next week in ICU. Pt currently recieving IV dobutamine drip and cardiac monitoring inpatient. Unable to determine discharge needs at this time.     Plan  Anticipated Discharge Date: TBD  Anticipated Discharge Plan: TBD  CC will continue to monitor patient's medical condition and progress towards discharge.  Kavitha Bates RN BSN  6C Unit Care Coordinator  Phone number: [phone]  Pager: [phone]

## 2018-05-12 LAB
ANION GAP SERPL CALCULATED.3IONS-SCNC: 9 MMOL/L (ref 3–14)
BUN SERPL-MCNC: 28 MG/DL (ref 7–30)
CALCIUM SERPL-MCNC: 9.6 MG/DL (ref 8.5–10.1)
CHLORIDE SERPL-SCNC: 98 MMOL/L (ref 94–109)
CO2 SERPL-SCNC: 30 MMOL/L (ref 20–32)
CREAT SERPL-MCNC: 7.07 MG/DL (ref 0.66–1.25)
GFR SERPL CREATININE-BSD FRML MDRD: 8 ML/MIN/1.7M2
GLUCOSE BLDC GLUCOMTR-MCNC: 148 MG/DL (ref 70–99)
GLUCOSE BLDC GLUCOMTR-MCNC: 149 MG/DL (ref 70–99)
GLUCOSE BLDC GLUCOMTR-MCNC: 164 MG/DL (ref 70–99)
GLUCOSE BLDC GLUCOMTR-MCNC: 206 MG/DL (ref 70–99)
GLUCOSE BLDC GLUCOMTR-MCNC: 227 MG/DL (ref 70–99)
GLUCOSE BLDC GLUCOMTR-MCNC: 243 MG/DL (ref 70–99)
GLUCOSE SERPL-MCNC: 125 MG/DL (ref 70–99)
MAGNESIUM SERPL-MCNC: 2.4 MG/DL (ref 1.6–2.3)
POTASSIUM SERPL-SCNC: 4.6 MMOL/L (ref 3.4–5.3)
SODIUM SERPL-SCNC: 137 MMOL/L (ref 133–144)

## 2018-05-12 PROCEDURE — 21400006 ZZH R&B CCU INTERMEDIATE UMMC

## 2018-05-12 PROCEDURE — A9270 NON-COVERED ITEM OR SERVICE: HCPCS | Mod: GY | Performed by: STUDENT IN AN ORGANIZED HEALTH CARE EDUCATION/TRAINING PROGRAM

## 2018-05-12 PROCEDURE — 25000128 H RX IP 250 OP 636: Performed by: INTERNAL MEDICINE

## 2018-05-12 PROCEDURE — 40000275 ZZH STATISTIC RCP TIME EA 10 MIN

## 2018-05-12 PROCEDURE — A9270 NON-COVERED ITEM OR SERVICE: HCPCS | Mod: GY | Performed by: NURSE PRACTITIONER

## 2018-05-12 PROCEDURE — 83735 ASSAY OF MAGNESIUM: CPT | Performed by: NURSE PRACTITIONER

## 2018-05-12 PROCEDURE — 94640 AIRWAY INHALATION TREATMENT: CPT | Mod: 76

## 2018-05-12 PROCEDURE — 25000132 ZZH RX MED GY IP 250 OP 250 PS 637: Performed by: NURSE PRACTITIONER

## 2018-05-12 PROCEDURE — 25000132 ZZH RX MED GY IP 250 OP 250 PS 637: Mod: GY | Performed by: STUDENT IN AN ORGANIZED HEALTH CARE EDUCATION/TRAINING PROGRAM

## 2018-05-12 PROCEDURE — 25000132 ZZH RX MED GY IP 250 OP 250 PS 637: Mod: GY | Performed by: INTERNAL MEDICINE

## 2018-05-12 PROCEDURE — 99233 SBSQ HOSP IP/OBS HIGH 50: CPT | Mod: GC | Performed by: INTERNAL MEDICINE

## 2018-05-12 PROCEDURE — A9270 NON-COVERED ITEM OR SERVICE: HCPCS | Mod: GY | Performed by: INTERNAL MEDICINE

## 2018-05-12 PROCEDURE — 90937 HEMODIALYSIS REPEATED EVAL: CPT

## 2018-05-12 PROCEDURE — 00000146 ZZHCL STATISTIC GLUCOSE BY METER IP

## 2018-05-12 PROCEDURE — 63400005 ZZH RX 634: Performed by: INTERNAL MEDICINE

## 2018-05-12 PROCEDURE — 25000125 ZZHC RX 250: Performed by: STUDENT IN AN ORGANIZED HEALTH CARE EDUCATION/TRAINING PROGRAM

## 2018-05-12 PROCEDURE — 80048 BASIC METABOLIC PNL TOTAL CA: CPT | Performed by: NURSE PRACTITIONER

## 2018-05-12 RX ORDER — DOXERCALCIFEROL 4 UG/2ML
0.5 INJECTION INTRAVENOUS
Status: COMPLETED | OUTPATIENT
Start: 2018-05-12 | End: 2018-05-12

## 2018-05-12 RX ORDER — HEPARIN SODIUM 1000 [USP'U]/ML
500 INJECTION, SOLUTION INTRAVENOUS; SUBCUTANEOUS
Status: COMPLETED | OUTPATIENT
Start: 2018-05-12 | End: 2018-05-12

## 2018-05-12 RX ORDER — HEPARIN SODIUM 1000 [USP'U]/ML
500 INJECTION, SOLUTION INTRAVENOUS; SUBCUTANEOUS CONTINUOUS
Status: DISCONTINUED | OUTPATIENT
Start: 2018-05-12 | End: 2018-05-12

## 2018-05-12 RX ADMIN — ATORVASTATIN CALCIUM 40 MG: 40 TABLET, FILM COATED ORAL at 19:53

## 2018-05-12 RX ADMIN — FLUTICASONE PROPIONATE 1 SPRAY: 50 SPRAY, METERED NASAL at 19:53

## 2018-05-12 RX ADMIN — SODIUM CHLORIDE 300 ML: 9 INJECTION, SOLUTION INTRAVENOUS at 08:30

## 2018-05-12 RX ADMIN — CETIRIZINE HYDROCHLORIDE 10 MG: 10 TABLET, FILM COATED ORAL at 08:12

## 2018-05-12 RX ADMIN — Medication 1 CAPSULE: at 08:12

## 2018-05-12 RX ADMIN — CALCIUM ACETATE 667 MG: 667 CAPSULE ORAL at 17:47

## 2018-05-12 RX ADMIN — OMEPRAZOLE 20 MG: 20 CAPSULE, DELAYED RELEASE ORAL at 19:53

## 2018-05-12 RX ADMIN — ASPIRIN 81 MG CHEWABLE TABLET 81 MG: 81 TABLET CHEWABLE at 19:53

## 2018-05-12 RX ADMIN — INSULIN ASPART 1 UNITS: 100 INJECTION, SOLUTION INTRAVENOUS; SUBCUTANEOUS at 17:44

## 2018-05-12 RX ADMIN — HEPARIN SODIUM 500 UNITS/HR: 1000 INJECTION, SOLUTION INTRAVENOUS; SUBCUTANEOUS at 10:41

## 2018-05-12 RX ADMIN — ALBUTEROL SULFATE 2.5 MG: 2.5 SOLUTION RESPIRATORY (INHALATION) at 21:59

## 2018-05-12 RX ADMIN — HEPARIN SODIUM 3000 UNITS: 1000 INJECTION, SOLUTION INTRAVENOUS; SUBCUTANEOUS at 12:05

## 2018-05-12 RX ADMIN — DOXERCALCIFEROL 0.5 MCG: 4 INJECTION, SOLUTION INTRAVENOUS at 10:44

## 2018-05-12 RX ADMIN — SODIUM CHLORIDE 250 ML: 9 INJECTION, SOLUTION INTRAVENOUS at 08:30

## 2018-05-12 RX ADMIN — HEPARIN SODIUM 500 UNITS: 1000 INJECTION, SOLUTION INTRAVENOUS; SUBCUTANEOUS at 08:35

## 2018-05-12 RX ADMIN — EPOETIN ALFA 4000 UNITS: 10000 SOLUTION INTRAVENOUS; SUBCUTANEOUS at 11:59

## 2018-05-12 RX ADMIN — ALTEPLASE 1 MG: 2.2 INJECTION, POWDER, LYOPHILIZED, FOR SOLUTION INTRAVENOUS at 05:23

## 2018-05-12 RX ADMIN — INSULIN GLARGINE 16 UNITS: 100 INJECTION, SOLUTION SUBCUTANEOUS at 21:56

## 2018-05-12 RX ADMIN — SENNOSIDES AND DOCUSATE SODIUM 1 TABLET: 8.6; 5 TABLET ORAL at 19:53

## 2018-05-12 RX ADMIN — INSULIN ASPART 3 UNITS: 100 INJECTION, SOLUTION INTRAVENOUS; SUBCUTANEOUS at 13:53

## 2018-05-12 RX ADMIN — INSULIN ASPART 4 UNITS: 100 INJECTION, SOLUTION INTRAVENOUS; SUBCUTANEOUS at 10:35

## 2018-05-12 RX ADMIN — CALCIUM ACETATE 667 MG: 667 CAPSULE ORAL at 10:37

## 2018-05-12 RX ADMIN — VITAMIN B12 0.1 MG ORAL TABLET 100 MCG: 0.1 TABLET ORAL at 08:12

## 2018-05-12 RX ADMIN — ALLOPURINOL 100 MG: 100 TABLET ORAL at 08:12

## 2018-05-12 NOTE — PLAN OF CARE
Problem: Cardiac: Heart Failure (Adult)  Goal: Signs and Symptoms of Listed Potential Problems Will be Absent, Minimized or Managed (Cardiac: Heart Failure)  Signs and symptoms of listed potential problems will be absent, minimized or managed by discharge/transition of care (reference Cardiac: Heart Failure (Adult) CPG).   Outcome: No Change  D/I: Monitor shows -110s. Continues on dobutamine drip at 2.5 mcg/kg/min. No uo. Continues on dialysis. Returned from dialysis at 1300. Denies pain or shortness of breath. See flowsheets for assessment and additional data.  A: Stable HF on dialysis.  P: Continue dobutamine as ordered. Encourage increased activity and participation in cares. Continue current cares and notify providers with questions or concerns.   05/12/18 1530   Cardiac: Heart Failure   Problems Assessed (Heart Failure) all   Problems Present (Heart Failure) cardiac pump dysfunction;fluid/electrolyte imbalance;situational response

## 2018-05-12 NOTE — PLAN OF CARE
Problem: Patient Care Overview  Goal: Plan of Care/Patient Progress Review  Outcome: No Change  D: Status 1B for heart/kidney transplant.  I: Dobutamine gtt 2.5 mcg/kg/min. NC 2L O2. tpa in gray lumen w/ blood return.   A: VSS see flowsheet. SR/ST w/ BBB. HR  w/ occasional PVC. Anuric, on hemodialysis. Up independently. Sleeping between cares.  P: Continue to monitor and follow POC. Notify Cards 2 with changes.

## 2018-05-12 NOTE — PROGRESS NOTES
"  [facility]   Cardiology II Service / Advanced Heart Failure  Daily Progress Note    Patient: Murray Nicholson  MRN: [id number]  Admission Date: 4/16/2018  Hospital Day # 26    Assessment and Plan: Murray is a 63 year old male with a PMH of CAD, ICM (EF of 15-20%), ESRD, bicuspid aortic valve with AI, severe MR, and proximal AAA who was admitted for decompensated heart failure. He is on dobutamine 2.5 mcg/kg/min and listed for heart/kidney transplant as status 1B.       Today's Plan:   - dialysis  - Will transfer to ICU for swan-guided therapy Sunday or Monday    # Chronic systolic heart failure secondary to ICM  # Severe mitral regurgitation  # Severe holodiastolic aortic insufficiency   Stage D NYHA Class III    Fluid status: euvolemic, on HD  Inotropy: continue dobutamine 2.5 mcg/kg/min  ACEi/ARB: contraindicated due to renal dysfunction  BB: contraindicated due to hypotension/currently on inotropes  Aldosterone antagonist: contraindicated due to renal dysfunction  SCD prophylaxis: none, defer given potential for transplant listing  Sleep apnea: yes, unable to tolerate CPAP  *listed status 1B (due to dobutamine) for heart/kidney transplant      # Multiple benign branch duct-IPMNs  MR of abdomen 4/16/18 : multiple cystic lesions in the pancreas, compatible with IPMNs. No main duct dilation or suspicious nodularity or septation. S/p EUS with FNA 5/7 s/p three days of prophylactic abx per GI - cytology consistent with benign IPMN. GI consulted: \"traditional these have a low rate of transformation, a small but relevant portion of patients will progress to developing high-risk lesions. Risk of transformation correlated to rate of growth, overall size, associated solid lesions (including mural nodules) and main duct involvement. Currently, he has none of these concerns. However, the fact that many of these lesions take years to become worrisome suggests that surveillance in these patients is a long-term " "proposition. That said, what has been demonstrated thus far should not detrimentally impact his transplant candidacy.\"     # NSVT  Monitor on tele. Keep K>4 Mg>2. 10 beat run 5/7, asymptomatic. Note QTc is borderline prolonged, avoid prolonging agents.     # Seborrheic keratoses  # Dermatofibromas  # Multiple benign nevi  # Nummular dermatitis   Dermatology consulted per renal request for suspicious lesions prior to transplant listing, lesions all felt to be benign, no need for further testing/considered low risk     # DM II   Last A1C 4/4/18  6.3. PTA Glipizide 5 mg daily. BS more frequently in 200s lately.   - start lantus 16 units at HS  - continue high intensity SS insulin, consider meal coverage     Chronic Medical Issues:  # Hx of allergic rhinitis: Continue cetrizine 10 mg daily  # ESRD: Dialysis on T, Th, Sa   # CAD: Coronary angiogram 2/8/17 showed no obstructive CAD.  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 Continue ASA 81 mg daily and atorvastatin 40 mg daily.   # Ascending aortic aneurysm: 4.5 x 4.5 cm proximal ascending aortic aneurysm seen on MRI 2/14. Recent echo 2/2/18 showed size of 4.2 cm. BP controlled.       FEN: regular diet, 2L FR  PROPHY: ambulation  LINES: PICC  DISPO: TBD  CODE STATUS:  Full  ================================================================    Subjective/24-Hr Events:   - occasional PVC's  - no acute issues overnight    ROS:  4 point ROS including respiratory, CV, GI and  (other than that noted in the HPI) is negative.     Medications: Reviewed in EPIC.     Physical Exam:   /78 (BP Location: Right arm)  Pulse 107  Temp 98.2  F (36.8  C) (Oral)  Resp 18  Ht 1.727 m (5' 8\")  Wt 78.7 kg (173 lb 9.6 oz)  SpO2 100%  BMI 26.4 kg/m2    GENERAL: Appears comfortable, in no distress.  HEENT: Eye symmetrical, no discharge or icterus bilaterally. Mucous membranes moist and without lesions.  NECK: Supple, JVD not visible at 90 degrees.   CV: " Mildly tachycardic, +S1S2, soft murmur across precordium, rub, or gallop.   RESPIRATORY: Respirations regular, even, and unlabored. Lungs CTA throughout.   GI: Soft and mildly distended with normoactive bowel sounds present in all quadrants. No tenderness, rebound, guarding.   EXTREMITIES: No peripheral edema. 2+ bilateral pedal pulses. Warm.   NEUROLOGIC: Alert and oriented x 3. No focal deficits.   MUSCULOSKELETAL: No joint swelling or tenderness.   SKIN: No jaundice. No rashes or lesions.     Labs:  CMP    Recent Labs  Lab 05/11/18  0758 05/10/18  0840 05/08/18  0455 05/07/18  0649   NA  --  135 137 135   POTASSIUM  --  4.6 4.1 4.0   CHLORIDE  --  97 97 97   CO2  --  30 31 30   ANIONGAP  --  8 8 8   GLC  --  160* 127* 123*   BUN  --  26 25 21   CR  --  7.60* 8.99* 7.51*   GFRESTIMATED  --  7* 6* 7*   GFRESTBLACK  --  9* 7* 9*   TRINI  --  9.2 9.0 9.1   MAG 2.0 1.6 2.0 1.6       CBC    Recent Labs  Lab 05/10/18  0840 05/08/18  0455 05/07/18  0649   WBC 6.0 4.4 4.9   RBC 3.09* 2.90* 3.12*   HGB 9.4* 8.6* 9.5*   HCT 29.4* 28.3* 30.4*   MCV 95 98 97   MCH 30.4 29.7 30.4   MCHC 32.0 30.4* 31.3*   RDW 15.2* 15.5* 15.4*    207 222       INR    Recent Labs  Lab 05/07/18  0649   INR 1.20*

## 2018-05-12 NOTE — PLAN OF CARE
Problem: Patient Care Overview  Goal: Plan of Care/Patient Progress Review  Outcome: No Change  Neuro: A&Ox4.   Cardiac: ST. VSS ex soft BP.   Respiratory: Sating 100% on RA, no tachypnea episodes  GI/: Anuric, had HD today. BM 5/12  Diet/appetite: Tolerating reg diet 2L FR. Eating well.  Activity:  Up ad edith, up to chair and in halls.  Pain: At acceptable level on current regimen.   Skin: No new deficits noted.  LDA's: L PICC infusing dobutamine gtt at 2.5mcg/kg/hr    Plan: Pt 1B status listed for heart/kidney transplant. Continue with POC. Notify primary team with changes.      Problem: Diabetes Comorbidity  Goal: Diabetes  Patient comorbidity will be monitored for signs and symptoms of hyperglycemia or hypoglycemia. Problems will be absent, minimized or managed by discharge/transition of care.   Outcome: No Change   this evening    Problem: Renal Insufficiency Comorbidity  Goal: Renal Insufficiency  Patient comorbidity will be monitored for signs and symptoms of Renal Insufficiency (Chronic) condition.  Problems will be absent, minimized or managed by discharge/transition of care.   Outcome: No Change  Creatinine 7.07 today

## 2018-05-12 NOTE — PROGRESS NOTES
HEMODIALYSIS TREATMENT NOTE    Date: 5/12/2018  Time: 12:27 PM    Data:  Pre Wt: 78.7 kg (173 lb 8 oz)   Desired Wt: 77 kg   Post Wt: 77.3 kg (170 lb 6.7 oz)   Weight change: 1.4 kg  Ultrafiltration - Post Run Net Total Removed (mL): 2300 mL  Vascular Access Status: Yes, secured and visible  Dialyzer Rinse: Streaked  Total Blood Volume Processed: 64  Total Dialysis (Treatment) Time: 3.5 hr     Lab:   No    Interventions:  Pt arrived for HD no c/o's and goal set up for net 1.7.  Pt did eat good breakfast while here so post wt net 1.4 kg off. Pt cont to have ankle edema 1+ and per crit line blood vol change was only -5%.      Assessment:  Pt not at EDW     Plan:    Slowly challenge wt down next week per renal orders.

## 2018-05-12 NOTE — PLAN OF CARE
Problem: Patient Care Overview  Goal: Plan of Care/Patient Progress Review   AVSS;  NSR/ST; Dobutamine gtt at 2.5mcg/kg/min; neuro intact; denies pain/nausea/SOB; on RA but intermittently uses O2 with activity; lung sounds clear; good appetite and po intake; anuric, on HD,plan for HD run tomorrow; DL PICC but purple lumen was occluded with no blood return; TPA given but with no result, second dose ordered and to be given once available from pharmacy; bedtime Ativan;  Independently ambulated in hallway;continue to monitor and with POC.

## 2018-05-13 ENCOUNTER — APPOINTMENT (OUTPATIENT)
Dept: GENERAL RADIOLOGY | Facility: CLINIC | Age: 63
DRG: 001 | End: 2018-05-13
Attending: INTERNAL MEDICINE
Payer: COMMERCIAL

## 2018-05-13 ENCOUNTER — CARE COORDINATION (OUTPATIENT)
Dept: TRANSPLANT | Facility: CLINIC | Age: 63
End: 2018-05-13

## 2018-05-13 LAB
BASE EXCESS BLDV CALC-SCNC: 9 MMOL/L
GLUCOSE BLDC GLUCOMTR-MCNC: 108 MG/DL (ref 70–99)
GLUCOSE BLDC GLUCOMTR-MCNC: 152 MG/DL (ref 70–99)
GLUCOSE BLDC GLUCOMTR-MCNC: 165 MG/DL (ref 70–99)
GLUCOSE BLDC GLUCOMTR-MCNC: 230 MG/DL (ref 70–99)
HCO3 BLDV-SCNC: 33 MMOL/L (ref 21–28)
O2/TOTAL GAS SETTING VFR VENT: 21 %
OXYHGB MFR BLDV: 42 %
PCO2 BLDV: 45 MM HG (ref 40–50)
PH BLDV: 7.48 PH (ref 7.32–7.43)
PO2 BLDV: 25 MM HG (ref 25–47)

## 2018-05-13 PROCEDURE — 40000275 ZZH STATISTIC RCP TIME EA 10 MIN

## 2018-05-13 PROCEDURE — 25000132 ZZH RX MED GY IP 250 OP 250 PS 637: Mod: GY | Performed by: INTERNAL MEDICINE

## 2018-05-13 PROCEDURE — 20000004 ZZH R&B ICU UMMC

## 2018-05-13 PROCEDURE — 25000132 ZZH RX MED GY IP 250 OP 250 PS 637: Mod: GY | Performed by: STUDENT IN AN ORGANIZED HEALTH CARE EDUCATION/TRAINING PROGRAM

## 2018-05-13 PROCEDURE — A9270 NON-COVERED ITEM OR SERVICE: HCPCS | Mod: GY | Performed by: INTERNAL MEDICINE

## 2018-05-13 PROCEDURE — 00000146 ZZHCL STATISTIC GLUCOSE BY METER IP

## 2018-05-13 PROCEDURE — A9270 NON-COVERED ITEM OR SERVICE: HCPCS | Mod: GY | Performed by: NURSE PRACTITIONER

## 2018-05-13 PROCEDURE — 25000128 H RX IP 250 OP 636: Performed by: STUDENT IN AN ORGANIZED HEALTH CARE EDUCATION/TRAINING PROGRAM

## 2018-05-13 PROCEDURE — 25000132 ZZH RX MED GY IP 250 OP 250 PS 637: Mod: GY | Performed by: NURSE PRACTITIONER

## 2018-05-13 PROCEDURE — 94640 AIRWAY INHALATION TREATMENT: CPT

## 2018-05-13 PROCEDURE — 4A133B3 MONITORING OF ARTERIAL PRESSURE, PULMONARY, PERCUTANEOUS APPROACH: ICD-10-PCS | Performed by: INTERNAL MEDICINE

## 2018-05-13 PROCEDURE — 76000 FLUOROSCOPY <1 HR PHYS/QHP: CPT

## 2018-05-13 PROCEDURE — 40000196 ZZH STATISTIC RAPCV CVP MONITORING

## 2018-05-13 PROCEDURE — 4A023N6 MEASUREMENT OF CARDIAC SAMPLING AND PRESSURE, RIGHT HEART, PERCUTANEOUS APPROACH: ICD-10-PCS | Performed by: INTERNAL MEDICINE

## 2018-05-13 PROCEDURE — A9270 NON-COVERED ITEM OR SERVICE: HCPCS | Mod: GY | Performed by: STUDENT IN AN ORGANIZED HEALTH CARE EDUCATION/TRAINING PROGRAM

## 2018-05-13 PROCEDURE — 4A1239Z MONITORING OF CARDIAC OUTPUT, PERCUTANEOUS APPROACH: ICD-10-PCS | Performed by: INTERNAL MEDICINE

## 2018-05-13 PROCEDURE — 02HQ32Z INSERTION OF MONITORING DEVICE INTO RIGHT PULMONARY ARTERY, PERCUTANEOUS APPROACH: ICD-10-PCS | Performed by: INTERNAL MEDICINE

## 2018-05-13 PROCEDURE — 40000048 ZZH STATISTIC DAILY SWAN MONITORING

## 2018-05-13 PROCEDURE — 99232 SBSQ HOSP IP/OBS MODERATE 35: CPT | Mod: GC | Performed by: INTERNAL MEDICINE

## 2018-05-13 PROCEDURE — 25000128 H RX IP 250 OP 636: Performed by: INTERNAL MEDICINE

## 2018-05-13 PROCEDURE — 82805 BLOOD GASES W/O2 SATURATION: CPT | Performed by: INTERNAL MEDICINE

## 2018-05-13 PROCEDURE — 71045 X-RAY EXAM CHEST 1 VIEW: CPT

## 2018-05-13 PROCEDURE — 25000132 ZZH RX MED GY IP 250 OP 250 PS 637: Performed by: NURSE PRACTITIONER

## 2018-05-13 PROCEDURE — 25000125 ZZHC RX 250: Performed by: STUDENT IN AN ORGANIZED HEALTH CARE EDUCATION/TRAINING PROGRAM

## 2018-05-13 RX ORDER — ACETAMINOPHEN 500 MG
500 TABLET ORAL EVERY 6 HOURS PRN
Status: DISCONTINUED | OUTPATIENT
Start: 2018-05-13 | End: 2018-06-15

## 2018-05-13 RX ORDER — DOPAMINE HYDROCHLORIDE 160 MG/100ML
2.5 INJECTION, SOLUTION INTRAVENOUS CONTINUOUS
Status: DISCONTINUED | OUTPATIENT
Start: 2018-05-13 | End: 2018-06-15

## 2018-05-13 RX ORDER — NALOXONE HYDROCHLORIDE 0.4 MG/ML
.1-.4 INJECTION, SOLUTION INTRAMUSCULAR; INTRAVENOUS; SUBCUTANEOUS
Status: DISCONTINUED | OUTPATIENT
Start: 2018-05-13 | End: 2018-06-15

## 2018-05-13 RX ADMIN — DOBUTAMINE HYDROCHLORIDE 2.5 MCG/KG/MIN: 400 INJECTION INTRAVENOUS at 02:25

## 2018-05-13 RX ADMIN — CETIRIZINE HYDROCHLORIDE 10 MG: 10 TABLET, FILM COATED ORAL at 08:19

## 2018-05-13 RX ADMIN — FLUTICASONE PROPIONATE 2 SPRAY: 50 SPRAY, METERED NASAL at 20:03

## 2018-05-13 RX ADMIN — TRAMADOL HYDROCHLORIDE 50 MG: 50 TABLET, COATED ORAL at 20:03

## 2018-05-13 RX ADMIN — LORAZEPAM 0.5 MG: 0.5 TABLET ORAL at 02:15

## 2018-05-13 RX ADMIN — ACETAMINOPHEN 500 MG: 500 TABLET, FILM COATED ORAL at 22:33

## 2018-05-13 RX ADMIN — VITAMIN B12 0.1 MG ORAL TABLET 100 MCG: 0.1 TABLET ORAL at 08:19

## 2018-05-13 RX ADMIN — LORAZEPAM 0.5 MG: 0.5 TABLET ORAL at 21:12

## 2018-05-13 RX ADMIN — OMEPRAZOLE 20 MG: 20 CAPSULE, DELAYED RELEASE ORAL at 20:03

## 2018-05-13 RX ADMIN — CALCIUM ACETATE 667 MG: 667 CAPSULE ORAL at 08:19

## 2018-05-13 RX ADMIN — ATORVASTATIN CALCIUM 40 MG: 40 TABLET, FILM COATED ORAL at 20:03

## 2018-05-13 RX ADMIN — Medication 1 CAPSULE: at 08:19

## 2018-05-13 RX ADMIN — CALCIUM ACETATE 667 MG: 667 CAPSULE ORAL at 13:30

## 2018-05-13 RX ADMIN — DOPAMINE HYDROCHLORIDE IN DEXTROSE 2.5 MCG/KG/MIN: 1.6 INJECTION, SOLUTION INTRAVENOUS at 19:09

## 2018-05-13 RX ADMIN — ALBUTEROL SULFATE 2.5 MG: 2.5 SOLUTION RESPIRATORY (INHALATION) at 21:24

## 2018-05-13 RX ADMIN — CALCIUM ACETATE 667 MG: 667 CAPSULE ORAL at 17:57

## 2018-05-13 RX ADMIN — INSULIN ASPART 2 UNITS: 100 INJECTION, SOLUTION INTRAVENOUS; SUBCUTANEOUS at 17:57

## 2018-05-13 RX ADMIN — ASPIRIN 81 MG CHEWABLE TABLET 81 MG: 81 TABLET CHEWABLE at 20:03

## 2018-05-13 RX ADMIN — ALLOPURINOL 100 MG: 100 TABLET ORAL at 08:19

## 2018-05-13 RX ADMIN — INSULIN GLARGINE 16 UNITS: 100 INJECTION, SOLUTION SUBCUTANEOUS at 21:22

## 2018-05-13 RX ADMIN — SENNOSIDES AND DOCUSATE SODIUM 1 TABLET: 8.6; 5 TABLET ORAL at 20:03

## 2018-05-13 NOTE — PROGRESS NOTES
"  Heart Failure Progress Note:    Assessment and Plan: Murray is a 63 year old male with a PMH of CAD, ICM (EF of 15-20%), ESRD, bicuspid aortic valve with AI, severe MR, and proximal AAA who was admitted for decompensated heart failure. He is on dobutamine 2.5 mcg/kg/min and listed for heart/kidney transplant as status 1B.       Today's Plan:   - transfer to CVICU for [place] directed therapy (pending bed availability)    # Stage D NYHA Class III systolic heart failure secondary to ICM, listed 1B  # Severe mitral regurgitation  # Severe holodiastolic aortic insufficiency   - continue dobutamine 2.5 mcg/kg/min; likely add Dopamine but will eval hemodynamics first  Sleep apnea: yes, unable to tolerate CPAP       #Chronic Medical Issues:  - Seborrheic keratoses / Dermatofibromas / Multiple benign nevi / Nummular dermatitis: derm consulted, all lesions benign  - Hx of allergic rhinitis: Continue cetrizine 10 mg daily  - ESRD: Dialysis on T, Th, Sa   - Non-obstructive CAD, Anomalous RCA: ASA 81 mg, atorvastatin 40 mg daily  - Ascending aortic aneurysm: 4.5 x 4.5 cm proximal ascending aortic aneurysm seen on MRI 2/14  - Multiple benign branch duct-IPMNs: no concerning features affecting transplant candidacy      FEN: regular diet, 2L FR  PROPHY: ambulation  LINES: PICC  DISPO: TBD  CODE STATUS:  Full  ================================================================    Subjective/24-Hr Events:   - no acute issues overnight  - many questions answered this AM  - wants to resume PT to practice \"not using his chest\" postoperatively    ROS:  4 point ROS including respiratory, CV, GI and  (other than that noted in the HPI) is negative.     Medications: Reviewed in EPIC.     Physical Exam:   /69  Pulse 107  Temp 97.8  F (36.6  C) (Oral)  Resp 18  Ht 1.727 m (5' 8\")  Wt 77.3 kg (170 lb 6.4 oz)  SpO2 100%  BMI 25.91 kg/m2  GENERAL: A&O x 3, NAD  NECK: no JVD  CV: Mildly tachycardic, +S1S2, soft murmur across precordium, " rub, or gallop.   RESPIRATORY: CTAB  GI: soft, NT, ND  EXTREMITIES: No peripheral edema. 2+ bilateral pedal pulses. Warm.   NEUROLOGIC: Alert and oriented x 3. No focal deficits.   SKIN: No jaundice. No rashes or lesions.     Labs / Imaging:  - all labs and imaging reviewed

## 2018-05-13 NOTE — PLAN OF CARE
Problem: Patient Care Overview  Goal: Plan of Care/Patient Progress Review  PT 6C: Cancel.  At time of attempt, pt being transported to ICU for line placement.  Will reschedule tomorrow.

## 2018-05-13 NOTE — PLAN OF CARE
Problem: Cardiac: Heart Failure (Adult)  Goal: Signs and Symptoms of Listed Potential Problems Will be Absent, Minimized or Managed (Cardiac: Heart Failure)  Signs and symptoms of listed potential problems will be absent, minimized or managed by discharge/transition of care (reference Cardiac: Heart Failure (Adult) CPG).   Outcome: No Change  D/I: Monitor shows -110s. Continues on dobutamine drip at 2.5 mcg/kg/min. No uo. Continues on dialysis. T/Th/S. Denies pain or shortness of breath. Up in shower with minimal assist and no difficulties. Independent in room. See flowsheets for assessment and additional data.  A: Stable HF, on dialysis.  P: Continue dobutamine as ordered. Encourage increased activity and participation in cares. Continue current cares and notify providers with questions or concerns. Transfer to  for PA catheter placement.     Transfer  Transferred to:  Via:bed  Reason for transfer: Pt inappropriate for 6C-needs PA catheter  Family: Patient to notify family  Belongings: Sent with pt  Chart: Sent with pt  Medications: Meds from bin sent with pt  Report called to: TRACEY RN

## 2018-05-13 NOTE — PLAN OF CARE
Problem: Patient Care Overview  Goal: Plan of Care/Patient Progress Review  Outcome: No Change  D: Status 1B for heart/kidney transplant.  I: Dobutamine gtt 2.5 mcg/kg/min. NC 2L O2 for sleep. PRN Ativan.  A: VSS see flowsheet. ST -110s w/ frequent PVC and rare PAC.  Anuric, on hemodialysis. Up independently. Evening walk on unit. Sleeping between cares.  P: Continue to monitor and follow POC. Notify Cards 2 with changes.

## 2018-05-13 NOTE — PLAN OF CARE
Problem: Patient Care Overview  Goal: Plan of Care/Patient Progress Review  Pt tx'd to CVICU from 6th floor, Ambulated pt in halls on RA, tolerated well w/ some report of minor MEADE. SWAN catheter placed at bedside under fluro,  Kassi calculated and results showed to team. Will start Dopamine drip and check numbers every 12 hours as ordered.

## 2018-05-13 NOTE — PROCEDURES
[place] Procedure Note    PROCEDURES PERFORMED:   Right Heart Catheterization  Fluoroscopy    PHYSICIANS:  Attending Physician: Klever Ernst MD  Cardiology Fellow: Sukumar Mejía MD    INDICATION:  Murray Nicholson is a 63 year old male listed for heart / kidney transplantation on inotropic therapy. He needs invasive hemodynamic assessment and titration of inotropes.    DESCRIPTION:  1. Consent obtained with discussion of risks.  All questions were answered.  2. Sterile prep and procedure.  3. Location with Sheaths:   Lf IJ  8 Fr 10 cm [short]  4. Access: Local anesthetic with lidocaine.  A standard 18 guage needle with ultrasound guidance was used to establish vascular access using a modified Seldinger technique.  5. Diagnostic Catheters:   7 Fr  [place]  6. Estimated blood loss: < 50 ml     HEMODYNAMICS:  BSA 1.93  -  bpm  - /76/88 mmHg  - RA 10   - PA 60/37  - PA sat 42%   - Hgb 9.4 g/dL   - Kassi CO 3.3   - Kassi CI 1.7   - SVR: 1942    Fluoroscopy Time: 0.5 min    COMPLICATIONS:  1. None    SUMMARY:   - successful placement of bedside [place] catheter via fluoroscopic guidance  - sutured into place at 62 cm    PLAN:   >> add Dopamine 2.5 mcg/kg/min    The attending advanced heart failure and transplant cardiologist was present and supervised all critical aspects the procedure.    Findings discussed with patient and Dr. Ernst.    Sukumar Mejía  Cardiology Fellow

## 2018-05-14 ENCOUNTER — DOCUMENTATION ONLY (OUTPATIENT)
Dept: TRANSPLANT | Facility: CLINIC | Age: 63
End: 2018-05-14

## 2018-05-14 ENCOUNTER — TELEPHONE (OUTPATIENT)
Dept: TRANSPLANT | Facility: CLINIC | Age: 63
End: 2018-05-14

## 2018-05-14 ENCOUNTER — APPOINTMENT (OUTPATIENT)
Dept: OCCUPATIONAL THERAPY | Facility: CLINIC | Age: 63
DRG: 001 | End: 2018-05-14
Attending: INTERNAL MEDICINE
Payer: COMMERCIAL

## 2018-05-14 LAB
BASE EXCESS BLDV CALC-SCNC: 5.1 MMOL/L
BASE EXCESS BLDV CALC-SCNC: 5.4 MMOL/L
BASE EXCESS BLDV CALC-SCNC: 6.5 MMOL/L
ERYTHROCYTE [DISTWIDTH] IN BLOOD BY AUTOMATED COUNT: 15.8 % (ref 10–15)
GLUCOSE BLDC GLUCOMTR-MCNC: 123 MG/DL (ref 70–99)
GLUCOSE BLDC GLUCOMTR-MCNC: 158 MG/DL (ref 70–99)
GLUCOSE BLDC GLUCOMTR-MCNC: 182 MG/DL (ref 70–99)
HCO3 BLDV-SCNC: 30 MMOL/L (ref 21–28)
HCO3 BLDV-SCNC: 30 MMOL/L (ref 21–28)
HCO3 BLDV-SCNC: 31 MMOL/L (ref 21–28)
HCT VFR BLD AUTO: 29.6 % (ref 40–53)
HGB BLD-MCNC: 9.4 G/DL (ref 13.3–17.7)
MCH RBC QN AUTO: 30.3 PG (ref 26.5–33)
MCHC RBC AUTO-ENTMCNC: 31.8 G/DL (ref 31.5–36.5)
MCV RBC AUTO: 96 FL (ref 78–100)
O2/TOTAL GAS SETTING VFR VENT: 21 %
O2/TOTAL GAS SETTING VFR VENT: 21 %
O2/TOTAL GAS SETTING VFR VENT: ABNORMAL %
OXYHGB MFR BLDV: 46 %
OXYHGB MFR BLDV: 51 %
OXYHGB MFR BLDV: 57 %
PCO2 BLDV: 45 MM HG (ref 40–50)
PH BLDV: 7.43 PH (ref 7.32–7.43)
PH BLDV: 7.44 PH (ref 7.32–7.43)
PH BLDV: 7.45 PH (ref 7.32–7.43)
PLATELET # BLD AUTO: 249 10E9/L (ref 150–450)
PO2 BLDV: 27 MM HG (ref 25–47)
PO2 BLDV: 29 MM HG (ref 25–47)
PO2 BLDV: 32 MM HG (ref 25–47)
RBC # BLD AUTO: 3.1 10E12/L (ref 4.4–5.9)
WBC # BLD AUTO: 6.2 10E9/L (ref 4–11)

## 2018-05-14 PROCEDURE — 25000132 ZZH RX MED GY IP 250 OP 250 PS 637: Mod: GY | Performed by: STUDENT IN AN ORGANIZED HEALTH CARE EDUCATION/TRAINING PROGRAM

## 2018-05-14 PROCEDURE — 99232 SBSQ HOSP IP/OBS MODERATE 35: CPT | Mod: GC | Performed by: INTERNAL MEDICINE

## 2018-05-14 PROCEDURE — 25000125 ZZHC RX 250: Performed by: STUDENT IN AN ORGANIZED HEALTH CARE EDUCATION/TRAINING PROGRAM

## 2018-05-14 PROCEDURE — A9270 NON-COVERED ITEM OR SERVICE: HCPCS | Mod: GY | Performed by: INTERNAL MEDICINE

## 2018-05-14 PROCEDURE — 94640 AIRWAY INHALATION TREATMENT: CPT

## 2018-05-14 PROCEDURE — 40000196 ZZH STATISTIC RAPCV CVP MONITORING

## 2018-05-14 PROCEDURE — 82805 BLOOD GASES W/O2 SATURATION: CPT | Performed by: INTERNAL MEDICINE

## 2018-05-14 PROCEDURE — 40000893 ZZH STATISTIC PT IP EVAL DEFER

## 2018-05-14 PROCEDURE — A9270 NON-COVERED ITEM OR SERVICE: HCPCS | Mod: GY | Performed by: NURSE PRACTITIONER

## 2018-05-14 PROCEDURE — 40000048 ZZH STATISTIC DAILY SWAN MONITORING

## 2018-05-14 PROCEDURE — 25000132 ZZH RX MED GY IP 250 OP 250 PS 637: Mod: GY | Performed by: INTERNAL MEDICINE

## 2018-05-14 PROCEDURE — 20000004 ZZH R&B ICU UMMC

## 2018-05-14 PROCEDURE — 97110 THERAPEUTIC EXERCISES: CPT | Mod: GO

## 2018-05-14 PROCEDURE — 25000132 ZZH RX MED GY IP 250 OP 250 PS 637: Performed by: NURSE PRACTITIONER

## 2018-05-14 PROCEDURE — A9270 NON-COVERED ITEM OR SERVICE: HCPCS | Mod: GY | Performed by: STUDENT IN AN ORGANIZED HEALTH CARE EDUCATION/TRAINING PROGRAM

## 2018-05-14 PROCEDURE — 85027 COMPLETE CBC AUTOMATED: CPT | Performed by: STUDENT IN AN ORGANIZED HEALTH CARE EDUCATION/TRAINING PROGRAM

## 2018-05-14 PROCEDURE — 40000275 ZZH STATISTIC RCP TIME EA 10 MIN

## 2018-05-14 PROCEDURE — 25000132 ZZH RX MED GY IP 250 OP 250 PS 637: Mod: GY | Performed by: NURSE PRACTITIONER

## 2018-05-14 PROCEDURE — 40000133 ZZH STATISTIC OT WARD VISIT

## 2018-05-14 PROCEDURE — 00000146 ZZHCL STATISTIC GLUCOSE BY METER IP

## 2018-05-14 RX ADMIN — CALCIUM ACETATE 667 MG: 667 CAPSULE ORAL at 08:43

## 2018-05-14 RX ADMIN — SENNOSIDES AND DOCUSATE SODIUM 1 TABLET: 8.6; 5 TABLET ORAL at 09:50

## 2018-05-14 RX ADMIN — ALBUTEROL SULFATE 2.5 MG: 2.5 SOLUTION RESPIRATORY (INHALATION) at 22:22

## 2018-05-14 RX ADMIN — INSULIN ASPART 1 UNITS: 100 INJECTION, SOLUTION INTRAVENOUS; SUBCUTANEOUS at 12:35

## 2018-05-14 RX ADMIN — OMEPRAZOLE 20 MG: 20 CAPSULE, DELAYED RELEASE ORAL at 20:22

## 2018-05-14 RX ADMIN — INSULIN GLARGINE 16 UNITS: 100 INJECTION, SOLUTION SUBCUTANEOUS at 22:15

## 2018-05-14 RX ADMIN — TRAMADOL HYDROCHLORIDE 50 MG: 50 TABLET, COATED ORAL at 04:09

## 2018-05-14 RX ADMIN — ASPIRIN 81 MG CHEWABLE TABLET 81 MG: 81 TABLET CHEWABLE at 20:22

## 2018-05-14 RX ADMIN — Medication 1 CAPSULE: at 08:42

## 2018-05-14 RX ADMIN — TRAMADOL HYDROCHLORIDE 50 MG: 50 TABLET, COATED ORAL at 23:14

## 2018-05-14 RX ADMIN — CALCIUM ACETATE 667 MG: 667 CAPSULE ORAL at 18:03

## 2018-05-14 RX ADMIN — FLUTICASONE PROPIONATE 1 SPRAY: 50 SPRAY, METERED NASAL at 20:23

## 2018-05-14 RX ADMIN — INSULIN ASPART 2 UNITS: 100 INJECTION, SOLUTION INTRAVENOUS; SUBCUTANEOUS at 18:03

## 2018-05-14 RX ADMIN — VITAMIN B12 0.1 MG ORAL TABLET 100 MCG: 0.1 TABLET ORAL at 08:42

## 2018-05-14 RX ADMIN — ALLOPURINOL 100 MG: 100 TABLET ORAL at 09:50

## 2018-05-14 RX ADMIN — CALCIUM ACETATE 667 MG: 667 CAPSULE ORAL at 12:35

## 2018-05-14 RX ADMIN — CETIRIZINE HYDROCHLORIDE 10 MG: 10 TABLET, FILM COATED ORAL at 09:50

## 2018-05-14 RX ADMIN — SENNOSIDES AND DOCUSATE SODIUM 1 TABLET: 8.6; 5 TABLET ORAL at 20:22

## 2018-05-14 RX ADMIN — ATORVASTATIN CALCIUM 40 MG: 40 TABLET, FILM COATED ORAL at 20:22

## 2018-05-14 ASSESSMENT — PAIN DESCRIPTION - DESCRIPTORS
DESCRIPTORS: SORE
DESCRIPTORS: SORE

## 2018-05-14 NOTE — PROGRESS NOTES
Patient in ICU, requiring dobutamine, dopamine gtt and hemodynamic monitoring. Per Dr. Ernst, patient should be listed as 1A on heart transplant wait list and now active on 1A list. Candidate will need reassessment for 1A status on 5/20/18.

## 2018-05-14 NOTE — PROGRESS NOTES
"SPIRITUAL HEALTH SERVICES  SPIRITUAL ASSESSMENT Progress Note  [facility] 4E     Follow-up visit with pt on 4E. Pt explained reasons for transfer to CVICU: \"I'm 1A on the transplant list now, so I'll be hanging out here for awhile...\"     PLAN: continue to follow. I let pt know I will be gone rest of week, will visit again next week.  support always available on-call.    Navid Alonso M.Div. ([place]), [facility]  Staff   Pager [phone]      "

## 2018-05-14 NOTE — PLAN OF CARE
Problem: Patient Care Overview  Goal: Plan of Care/Patient Progress Review  Outcome: No Change  D: Status 1A for Heart Txp  I/A: Neuro: Alert and oriented; prn tramadol and tylenol for pain.  Resp: Sats >90% on RA; patient requests 2L NC at night for comfort.  Card: Sinus Tach 110s; MAP >65. See flowsheets for hemodynamics.  GI: Regular diet; 2L FR  : Anuric (HD)    P: Continue to monitor; hemodynamics Q12

## 2018-05-14 NOTE — PLAN OF CARE
Problem: Patient Care Overview  Goal: Plan of Care/Patient Progress Review  Outcome: No Change  Pt continues on Dopamine and Dobutamine drip, Hemodynamics ordered and BECKA performed and charted every 12 hrs. Pt had a bout of nausea and slight diaphoresis, BS WDL, and requested Zofran or Vincent, MD notified and came to speak to pt about his EKG and prolonged QT , pt given cool cloth for head,encouraged to take deep breaths and layed down for while,pt stated Pt ambulated halls and 4C and 4a today w/ SBA w/ writer and PT, tolerated well until the end of walk where pt needed to sit down and rest after having MEADE. Pt did well after that, sats 100%, pA pressures unchanged and BP WDL.

## 2018-05-14 NOTE — TELEPHONE ENCOUNTER
Patient Call: please call Murray       Reason for call: Please call Murray # [phone]     Currently admitted here 4th floor rm 510  `Training` on Tuesday, May 15th, 2018 2:00-4:30pm    Call back needed? Yes    Return Call Needed  Same as documented in contacts section  When to return call?: Same day: Route [facility]

## 2018-05-14 NOTE — PLAN OF CARE
Problem: Patient Care Overview  Goal: Plan of Care/Patient Progress Review  PT 4E: Defer- PT orders received. Patient is independent with mobility, listed for heart txp status 1A, limited by generalized deconditioning. Patient only demonstrates skilled need for one therapy discipline at this time, OT is following for cardiac rehab, PT will complete orders. Please place new orders if there is a change in functional status.

## 2018-05-14 NOTE — PLAN OF CARE
Problem: Patient Care Overview  Goal: Plan of Care/Patient Progress Review  4E OT/CR Discharge Planner OT   Patient plan for discharge: not discussed  Current status: Pt SBA sit<>stand. Pt completed 5 UE calisthenic exercises for 60 seconds with a 30 second rest break between exercises. Pt ambulated ~8 minutes with one seated rest break at SBA. Educated pt on deep breathing during ambulation.  Barriers to return to prior living situation: SOB, acute medical needs, fatigue  Recommendations for discharge: Home with OP CR  Rationale for recommendations: Pt is independent with most ADL/IADLs, however would benefit from continued skilled therapy to increase exercise endurance.        Entered by: Annmarie Freitas 05/14/2018 3:50 PM

## 2018-05-14 NOTE — PROGRESS NOTES
"  Advanced Heart Failure and Transplant Cardiology  Progress Note:    Assessment and Plan:  63 year old male with a PMH of CAD, ICM (EF of 15-20%), ESRD, bicuspid aortic valve with AI, severe MR, and proximal AAA who was admitted for decompensated heart failure. He is on dobutamine 2.5 mcg/kg/min and listed for heart/kidney transplant as status 1A.      Today's Plan:   - no major changes    # Stage D NYHA Class III systolic heart failure secondary to ICM, listed 1A  # Severe mitral regurgitation  # Severe holodiastolic aortic insufficiency   - continue dobutamine 2.5 mcg/kg/min and Dopamine 2.5 mcg/kg/min  Sleep apnea: yes, unable to tolerate CPAP  - review at meeting this week       # Chronic Medical Issues:  - Seborrheic keratoses / Dermatofibromas / Multiple benign nevi / Nummular dermatitis: derm consulted, all lesions benign  - Hx of allergic rhinitis: cetrizine  - ESRD: Dialysis T, Th, Sa   - Non-obstructive CAD, Anomalous RCA: ASA 81 mg, atorvastatin 40 mg daily  - Ascending aortic aneurysm: 4.5 x 4.5 cm proximal ascending aortic aneurysm seen on MRI 2/14  - Multiple benign branch duct-IPMNs: no concerning features affecting transplant candidacy      FEN: regular diet, 2L FR  PROPHY: ambulation  LINES: PICC  DISPO: TBD  CODE STATUS:  Full  ================================================================    Subjective/24-Hr Events:   - no acute issues overnight  - mild discomfort at [place] site but improved    ROS:  4 point ROS including respiratory, CV, GI and  (other than that noted in the HPI) is negative.     Medications: Reviewed in EPIC.     Physical Exam:   /88  Pulse 107  Temp 97.6  F (36.4  C) (Oral)  Resp 18  Ht 1.727 m (5' 8\")  Wt 76.6 kg (168 lb 14 oz)  SpO2 100%  BMI 25.68 kg/m2  GENERAL: A&O x 3, NAD  NECK: no JVD, L-sided [place] in place bandaged and c/d/i  CV: Mildly tachycardic, +S1S2, soft murmur across precordium  RESPIRATORY: CTAB  GI: soft, NT, ND  EXTREMITIES: No peripheral " edema. 2+ bilateral pedal pulses. Warm.   NEUROLOGIC: Alert and oriented x 3. No focal deficits.   SKIN: No jaundice. No rashes or lesions.     Labs / Imaging:  - all labs and imaging reviewed

## 2018-05-14 NOTE — PROGRESS NOTES
Care Coordinator Progress Note    Admission Date/Time:  4/16/2018  Attending MD:  Klever Ernst MD    Data  Chart reviewed, discussed with interdisciplinary team.   Patient was admitted for:    Chronic systolic heart failure (H)  End stage renal disease (H).     Assessment  Pt transferred to ICU yesterday, 5/13 for invasive hemodynamic monitoring and titration of inotrope.  Pt is listed as status 1A for heart and kidney transplant.  Visited pt for support and to introduce ICU RNCC role.  RNCC role discussed and contact info provided.  Pt stated the team have been updating him well about the plan of care.  RNCC will cont to follow plan of care.     Plan  Anticipated Discharge Date:  TBD.  Anticipated Discharge Plan:   TBD.  RNCC will cont to follow plan of care.      Gianna Dougherty RN, PHN, BSN  4A and 4E/ ICU  Care Coordinator  Phone: [phone]  Pager: [phone]

## 2018-05-15 LAB
ANION GAP SERPL CALCULATED.3IONS-SCNC: 6 MMOL/L (ref 3–14)
BASE EXCESS BLDV CALC-SCNC: 5.2 MMOL/L
BASE EXCESS BLDV CALC-SCNC: 8.7 MMOL/L
BUN SERPL-MCNC: 34 MG/DL (ref 7–30)
CALCIUM SERPL-MCNC: 9.8 MG/DL (ref 8.5–10.1)
CHLORIDE SERPL-SCNC: 98 MMOL/L (ref 94–109)
CO2 SERPL-SCNC: 31 MMOL/L (ref 20–32)
CREAT SERPL-MCNC: 8.18 MG/DL (ref 0.66–1.25)
ERYTHROCYTE [DISTWIDTH] IN BLOOD BY AUTOMATED COUNT: 15.9 % (ref 10–15)
GFR SERPL CREATININE-BSD FRML MDRD: 7 ML/MIN/1.7M2
GLUCOSE BLDC GLUCOMTR-MCNC: 128 MG/DL (ref 70–99)
GLUCOSE BLDC GLUCOMTR-MCNC: 164 MG/DL (ref 70–99)
GLUCOSE BLDC GLUCOMTR-MCNC: 217 MG/DL (ref 70–99)
GLUCOSE BLDC GLUCOMTR-MCNC: 227 MG/DL (ref 70–99)
GLUCOSE BLDC GLUCOMTR-MCNC: 86 MG/DL (ref 70–99)
GLUCOSE BLDC GLUCOMTR-MCNC: 93 MG/DL (ref 70–99)
GLUCOSE SERPL-MCNC: 88 MG/DL (ref 70–99)
HCO3 BLDV-SCNC: 30 MMOL/L (ref 21–28)
HCO3 BLDV-SCNC: 34 MMOL/L (ref 21–28)
HCT VFR BLD AUTO: 27.9 % (ref 40–53)
HGB BLD-MCNC: 8.9 G/DL (ref 13.3–17.7)
MAGNESIUM SERPL-MCNC: 2 MG/DL (ref 1.6–2.3)
MCH RBC QN AUTO: 30.4 PG (ref 26.5–33)
MCHC RBC AUTO-ENTMCNC: 31.9 G/DL (ref 31.5–36.5)
MCV RBC AUTO: 95 FL (ref 78–100)
O2/TOTAL GAS SETTING VFR VENT: 21 %
O2/TOTAL GAS SETTING VFR VENT: ABNORMAL %
OXYHGB MFR BLDV: 48 %
OXYHGB MFR BLDV: 49 %
PCO2 BLDV: 46 MM HG (ref 40–50)
PCO2 BLDV: 46 MM HG (ref 40–50)
PH BLDV: 7.43 PH (ref 7.32–7.43)
PH BLDV: 7.47 PH (ref 7.32–7.43)
PHOSPHATE SERPL-MCNC: 3.6 MG/DL (ref 2.5–4.5)
PLATELET # BLD AUTO: 244 10E9/L (ref 150–450)
PO2 BLDV: 28 MM HG (ref 25–47)
PO2 BLDV: 28 MM HG (ref 25–47)
POTASSIUM SERPL-SCNC: 4.6 MMOL/L (ref 3.4–5.3)
RBC # BLD AUTO: 2.93 10E12/L (ref 4.4–5.9)
SODIUM SERPL-SCNC: 135 MMOL/L (ref 133–144)
WBC # BLD AUTO: 5.6 10E9/L (ref 4–11)

## 2018-05-15 PROCEDURE — 00000146 ZZHCL STATISTIC GLUCOSE BY METER IP

## 2018-05-15 PROCEDURE — 82805 BLOOD GASES W/O2 SATURATION: CPT | Performed by: INTERNAL MEDICINE

## 2018-05-15 PROCEDURE — 25000128 H RX IP 250 OP 636: Performed by: STUDENT IN AN ORGANIZED HEALTH CARE EDUCATION/TRAINING PROGRAM

## 2018-05-15 PROCEDURE — A9270 NON-COVERED ITEM OR SERVICE: HCPCS | Mod: GY | Performed by: INTERNAL MEDICINE

## 2018-05-15 PROCEDURE — 40000196 ZZH STATISTIC RAPCV CVP MONITORING

## 2018-05-15 PROCEDURE — 84100 ASSAY OF PHOSPHORUS: CPT | Performed by: NURSE PRACTITIONER

## 2018-05-15 PROCEDURE — 25000132 ZZH RX MED GY IP 250 OP 250 PS 637: Mod: GY | Performed by: STUDENT IN AN ORGANIZED HEALTH CARE EDUCATION/TRAINING PROGRAM

## 2018-05-15 PROCEDURE — 25000132 ZZH RX MED GY IP 250 OP 250 PS 637: Performed by: STUDENT IN AN ORGANIZED HEALTH CARE EDUCATION/TRAINING PROGRAM

## 2018-05-15 PROCEDURE — 25000132 ZZH RX MED GY IP 250 OP 250 PS 637: Mod: GY | Performed by: NURSE PRACTITIONER

## 2018-05-15 PROCEDURE — A9270 NON-COVERED ITEM OR SERVICE: HCPCS | Mod: GY | Performed by: STUDENT IN AN ORGANIZED HEALTH CARE EDUCATION/TRAINING PROGRAM

## 2018-05-15 PROCEDURE — A9270 NON-COVERED ITEM OR SERVICE: HCPCS | Mod: GY | Performed by: NURSE PRACTITIONER

## 2018-05-15 PROCEDURE — 25000128 H RX IP 250 OP 636: Performed by: INTERNAL MEDICINE

## 2018-05-15 PROCEDURE — 40000048 ZZH STATISTIC DAILY SWAN MONITORING

## 2018-05-15 PROCEDURE — 40000275 ZZH STATISTIC RCP TIME EA 10 MIN

## 2018-05-15 PROCEDURE — 25000132 ZZH RX MED GY IP 250 OP 250 PS 637: Performed by: NURSE PRACTITIONER

## 2018-05-15 PROCEDURE — 99232 SBSQ HOSP IP/OBS MODERATE 35: CPT | Mod: GC | Performed by: INTERNAL MEDICINE

## 2018-05-15 PROCEDURE — 80048 BASIC METABOLIC PNL TOTAL CA: CPT | Performed by: NURSE PRACTITIONER

## 2018-05-15 PROCEDURE — 90937 HEMODIALYSIS REPEATED EVAL: CPT

## 2018-05-15 PROCEDURE — 63400005 ZZH RX 634: Performed by: INTERNAL MEDICINE

## 2018-05-15 PROCEDURE — 83735 ASSAY OF MAGNESIUM: CPT | Performed by: NURSE PRACTITIONER

## 2018-05-15 PROCEDURE — 85027 COMPLETE CBC AUTOMATED: CPT | Performed by: NURSE PRACTITIONER

## 2018-05-15 PROCEDURE — 94640 AIRWAY INHALATION TREATMENT: CPT | Mod: 76

## 2018-05-15 PROCEDURE — 25000132 ZZH RX MED GY IP 250 OP 250 PS 637: Mod: GY | Performed by: INTERNAL MEDICINE

## 2018-05-15 PROCEDURE — 20000004 ZZH R&B ICU UMMC

## 2018-05-15 PROCEDURE — 25000125 ZZHC RX 250: Performed by: STUDENT IN AN ORGANIZED HEALTH CARE EDUCATION/TRAINING PROGRAM

## 2018-05-15 RX ORDER — HEPARIN SODIUM 1000 [USP'U]/ML
500 INJECTION, SOLUTION INTRAVENOUS; SUBCUTANEOUS
Status: COMPLETED | OUTPATIENT
Start: 2018-05-15 | End: 2018-05-15

## 2018-05-15 RX ORDER — HEPARIN SODIUM 1000 [USP'U]/ML
500 INJECTION, SOLUTION INTRAVENOUS; SUBCUTANEOUS CONTINUOUS
Status: DISCONTINUED | OUTPATIENT
Start: 2018-05-15 | End: 2018-05-15

## 2018-05-15 RX ORDER — DOXERCALCIFEROL 4 UG/2ML
0.5 INJECTION INTRAVENOUS
Status: COMPLETED | OUTPATIENT
Start: 2018-05-15 | End: 2018-05-15

## 2018-05-15 RX ORDER — LORAZEPAM 0.5 MG/1
0.5 TABLET ORAL
Status: COMPLETED | OUTPATIENT
Start: 2018-05-15 | End: 2018-05-15

## 2018-05-15 RX ADMIN — OMEPRAZOLE 20 MG: 20 CAPSULE, DELAYED RELEASE ORAL at 20:47

## 2018-05-15 RX ADMIN — SENNOSIDES AND DOCUSATE SODIUM 1 TABLET: 8.6; 5 TABLET ORAL at 20:46

## 2018-05-15 RX ADMIN — INSULIN GLARGINE 16 UNITS: 100 INJECTION, SOLUTION SUBCUTANEOUS at 22:18

## 2018-05-15 RX ADMIN — CALCIUM ACETATE 667 MG: 667 CAPSULE ORAL at 20:47

## 2018-05-15 RX ADMIN — SODIUM CHLORIDE 300 ML: 9 INJECTION, SOLUTION INTRAVENOUS at 10:25

## 2018-05-15 RX ADMIN — HEPARIN SODIUM 3000 UNITS: 1000 INJECTION, SOLUTION INTRAVENOUS; SUBCUTANEOUS at 13:59

## 2018-05-15 RX ADMIN — HEPARIN SODIUM 500 UNITS: 1000 INJECTION, SOLUTION INTRAVENOUS; SUBCUTANEOUS at 10:25

## 2018-05-15 RX ADMIN — ATORVASTATIN CALCIUM 40 MG: 40 TABLET, FILM COATED ORAL at 20:47

## 2018-05-15 RX ADMIN — CETIRIZINE HYDROCHLORIDE 10 MG: 10 TABLET, FILM COATED ORAL at 08:29

## 2018-05-15 RX ADMIN — VITAMIN B12 0.1 MG ORAL TABLET 100 MCG: 0.1 TABLET ORAL at 08:31

## 2018-05-15 RX ADMIN — ALBUTEROL SULFATE 2.5 MG: 2.5 SOLUTION RESPIRATORY (INHALATION) at 22:09

## 2018-05-15 RX ADMIN — LORAZEPAM 0.5 MG: 0.5 TABLET ORAL at 21:27

## 2018-05-15 RX ADMIN — HEPARIN SODIUM 3000 UNITS: 1000 INJECTION, SOLUTION INTRAVENOUS; SUBCUTANEOUS at 13:55

## 2018-05-15 RX ADMIN — EPOETIN ALFA 4000 UNITS: 10000 SOLUTION INTRAVENOUS; SUBCUTANEOUS at 10:51

## 2018-05-15 RX ADMIN — ALLOPURINOL 100 MG: 100 TABLET ORAL at 08:29

## 2018-05-15 RX ADMIN — DOPAMINE HYDROCHLORIDE IN DEXTROSE 2.5 MCG/KG/MIN: 1.6 INJECTION, SOLUTION INTRAVENOUS at 04:23

## 2018-05-15 RX ADMIN — POLYETHYLENE GLYCOL 3350 17 G: 17 POWDER, FOR SOLUTION ORAL at 04:27

## 2018-05-15 RX ADMIN — INSULIN ASPART 1 UNITS: 100 INJECTION, SOLUTION INTRAVENOUS; SUBCUTANEOUS at 12:43

## 2018-05-15 RX ADMIN — IRON SUCROSE 50 MG: 20 INJECTION, SOLUTION INTRAVENOUS at 10:51

## 2018-05-15 RX ADMIN — ASPIRIN 81 MG CHEWABLE TABLET 81 MG: 81 TABLET CHEWABLE at 20:47

## 2018-05-15 RX ADMIN — SENNOSIDES AND DOCUSATE SODIUM 1 TABLET: 8.6; 5 TABLET ORAL at 08:29

## 2018-05-15 RX ADMIN — FLUTICASONE PROPIONATE 2 SPRAY: 50 SPRAY, METERED NASAL at 20:50

## 2018-05-15 RX ADMIN — Medication 1 CAPSULE: at 08:32

## 2018-05-15 RX ADMIN — HEPARIN SODIUM 500 UNITS/HR: 1000 INJECTION, SOLUTION INTRAVENOUS; SUBCUTANEOUS at 10:25

## 2018-05-15 RX ADMIN — CALCIUM ACETATE 667 MG: 667 CAPSULE ORAL at 12:43

## 2018-05-15 RX ADMIN — DOXERCALCIFEROL 0.5 MCG: 4 INJECTION, SOLUTION INTRAVENOUS at 13:56

## 2018-05-15 RX ADMIN — CALCIUM ACETATE 667 MG: 667 CAPSULE ORAL at 08:31

## 2018-05-15 RX ADMIN — SODIUM CHLORIDE 250 ML: 9 INJECTION, SOLUTION INTRAVENOUS at 10:25

## 2018-05-15 NOTE — PLAN OF CARE
Problem: Patient Care Overview  Goal: Plan of Care/Patient Progress Review  Outcome: No Change  A/Ox4. VSS on 2 LPM NC. Per orders, do not wake for vitals overnight. Tramadol x1 for neck pain. Sinus tach 100's-110's with rare PVC. Continues on dopamine at 2.5 mcg/kg/min and dobutamine at 2.5 mcg/kg/min. CVP 7, PAP 50/34. SVO2 49. CI 1.9. Good appetite. Anuric. Small BM x1. Continue with POC. Notify Cards 2 with concerns. HD today.

## 2018-05-15 NOTE — PLAN OF CARE
Problem: Patient Care Overview  Goal: Plan of Care/Patient Progress Review  Outcome: No Change  Hemodynamically stable. See flowsheets. Dobutamine and dopamine drips. No changes. Dialysis today. 1.7L of fluid removed. Tolerated well.

## 2018-05-15 NOTE — PROGRESS NOTES
SPIRITUAL HEALTH SERVICES  SPIRITUAL ASSESSMENT Progress Note  [facility] 4E     Brief check-in with pt and his son, to let pt know I will be out of hospital until next Monday.     PLAN: will visit pt again early next week. On-call  support always available if pt requests.    Navid Alonso M.Div (Bill)., [facility]  Staff   Pager [phone]

## 2018-05-15 NOTE — PROGRESS NOTES
HEMODIALYSIS TREATMENT NOTE    Date: 5/15/2018  Time: 3:50 PM    Data:  Pre Wt:   78.7 kg   Desired Wt:   77 kg   Ultrafiltration - Post Run Net Total Removed (mL):   1700 mL  Ultrafiltration - Post Run Net Total Gain (mL):   0 mL  Vascular Access Status: Yes, secured and visible  Dialyzer Rinse: Light  Total Blood Volume Processed:   67.7 L  Total Dialysis (Treatment) Time:   3.5 hours    Lab:   No    Assessment/Interventions:  3.5 hours of HD via right CVC on K2Ca2.5 bath. Tachycardic prior to and throughout run. All other VSS. Tolerated well. 1.7 kg total removed. Hectorol, Epogen, and Venofer administered as ordered. CVC Heparin locked post-treatment per orders. Report given to primary RN on 4E.        Plan:    Per renal.

## 2018-05-15 NOTE — PROGRESS NOTES
"  Nephrology Progress Note  05/15/2018         Assessment & Recommendations:   Murray Nicholson is a 63 yr old male with PMH of HTN, DMII, functionally bicuspid aortic valve, CHF/CM (EF 10-15%), ESRD, admitted with worsening dyspnea/SOB, now on dobutamine drip and being evaluated for heart/kidney transplant.      ESKD: due to DMII, on PD since Aug 2017, changed to iHD 1/2018 due to polymicrobial and fungal peritonitis, now on TTS schedule at [facility] under care of Dr Mcpherson. Access: tunneled RIJ (replaced 4/17/18). Run time: 4 hrs; EDW 77 kg.  - Dialysis per TTS schedule   - Using low dose heparin on HD  - Heparin lock CVC          Volume: 77 kg, RHC 5/1 (done at 77 kg): RA 3, PCW 15; CVP 7   - Daily standing weights please     Severe AV and MR insufficiency:   BP/congestive CM (EF 10-15%); minimal CAD per angio on 2/8/17. Chronically hypotensive with tachycardia  - Goal MAP > 65 and SBP > 95 while dialyzing  - On dobutamine 2.5 mcg/kg/min (started 4/20/18)  - Listed for heart/kidney tx, in ICU on dobutamine and dopamine         Anemia: hgb 8.9, labs 5/8: ferritin 621, IS 27, iron 58; on maintenance venofer 50 mg qTuesday   - Continue venofer  - Restarted epogen at 4000 units per HD      BMD: calcium 9.8, alb 3.5, phos 3.2; recent ; Vit D 27; on hectorol 0.5 mcg via HD; on phoslo 1 tab TID with meals.   - Continue hectorol via HD  - Continue Phoslo  - Ordered phos lab       Recommendations were communicated to primary team via this note.       Kristi Armas PA-C  Division of Kidney Disease  [phone]    Interval History :   Seen bedside, dialysis later this morning, will UF to dry weight.  Denies CP, SOB, chills, n/v.    Review of Systems:   4 point ROS negative other than as noted above.    Physical Exam:   I/O last 3 completed shifts:  In: 762.4 [P.O.:520; I.V.:242.4]  Out: 30 [Urine:30]   /84 (BP Location: Right arm)  Pulse 107  Temp 98.1  F (36.7  C) (Oral)  Resp 18  Ht 1.727 m (5' 8\")  " Wt 78.7 kg (173 lb 8 oz)  SpO2 99%  BMI 26.38 kg/m2     GENERAL APPEARANCE: alert, NAD  EYES: no scleral icterus, pupils equal   Pulmonary: lungs clear to auscultation with equal breath sounds bilaterally; has swan  CV: regular rhythm, tachycardia at baseline   - Edema trace  GI: soft, nontender, normal bowel sounds  MS: no evidence of inflammation in joints, no muscle tenderness  : no doyle  SKIN: no rash, warm, dry, no cyanosis  NEURO: face symmetric, no asterixis   Access: tunneled [facility]       Labs:   All labs reviewed by me  Electrolytes/Renal -   Recent Labs   Lab Test  05/15/18   0402  05/12/18   0656  05/11/18   0758  05/10/18   0840   05/01/18   0930   04/21/18   0640   04/16/18   1546   NA  135  137   --   135   < >  133   < >  135   < >  135   POTASSIUM  4.6  4.6   --   4.6   < >  5.0   < >  4.0   < >  5.0   CHLORIDE  98  98   --   97   < >  94   < >  96   < >  101   CO2  31  30   --   30   < >  31   < >  24   < >  18*   BUN  34*  28   --   26   < >  36*   < >  41*   < >  63*   CR  8.18*  7.07*   --   7.60*   < >  8.51*   < >  7.72*   < >  8.81*   GLC  88  125*   --   160*   < >  100*   < >  111*   < >  253*   TRINI  9.8  9.6   --   9.2   < >  9.7   < >  9.2   < >  9.6   MAG  2.0  2.4*  2.0  1.6   < >  2.4*   < >  1.6   < >  1.9   PHOS   --    --    --    --    --   3.2   --   5.4*   --   6.4*    < > = values in this interval not displayed.       CBC -   Recent Labs   Lab Test  05/15/18   0402  05/14/18   0853  05/10/18   0840   WBC  5.6  6.2  6.0   HGB  8.9*  9.4*  9.4*   PLT  244  249  258       LFTs -   Recent Labs   Lab Test  04/16/18   1546  03/07/18   0833  02/19/18   1558  02/02/18   1736   ALKPHOS  123  129  102  86   BILITOTAL  0.8  0.7  0.6  0.4   ALT  22  21  15  16   AST  17  18  18  20   PROTTOTAL  7.4   --   7.2  6.2*   ALBUMIN  3.5   --   2.7*  2.1*       Iron Panel -   Recent Labs   Lab Test  05/08/18   0954  07/19/17   1306  07/05/17   1204   IRON  58  46  26*   IRONSAT  27  18  12*   ALBERTINA   621*  369  542*         Imaging:  Reviewed    Current Medications:    sodium chloride 0.9%  250 mL Intravenous Once in dialysis     sodium chloride 0.9%  300 mL Hemodialysis Machine Once     albuterol  2.5 mg Nebulization At Bedtime     allopurinol  100 mg Oral Daily     aspirin  81 mg Oral Daily     atorvastatin  40 mg Oral Daily     calcium acetate  667 mg Oral TID w/meals     cetirizine  10 mg Oral Daily     cyanocolbalamin  100 mcg Oral Daily     doxercalciferol  0.5 mcg Intravenous Once in dialysis     epoetin emily (EPOGEN,PROCRIT) inj ESRD  4,000 Units Intravenous Once in dialysis     fluticasone  1-2 spray Both Nostrils Daily     gelatin absorbable  1 each Topical During Hemodialysis (from stock)     heparin (porcine)  500 Units Hemodialysis Machine Once in dialysis     heparin  3 mL Intracatheter During Hemodialysis (from stock)     heparin  3 mL Intracatheter During Hemodialysis (from stock)     insulin aspart  1-10 Units Subcutaneous TID AC     insulin aspart  1-7 Units Subcutaneous At Bedtime     insulin glargine  16 Units Subcutaneous At Bedtime     iron sucrose  50 mg Intravenous Once in dialysis     NEPHROCAPS  1 capsule Oral Daily     omeprazole  20 mg Oral Daily     polyethylene glycol  17 g Oral Daily     senna-docusate  1 tablet Oral BID     sodium chloride (PF)  10 mL Intracatheter Q7 Days     sodium chloride (PF)  3 mL Intracatheter Q8H     sodium chloride (PF)  3 mL Intracatheter During Hemodialysis (from stock)     sodium chloride (PF)  3 mL Intracatheter During Hemodialysis (from stock)       DOBUTamine 2.5 mcg/kg/min (05/15/18 0600)     DOPamine 2.5 mcg/kg/min (05/15/18 0600)     heparin (porcine) 500 Units/hr (05/08/18 0900)     heparin (porcine) 500 Units/hr (05/12/18 1041)     heparin (porcine)       - MEDICATION INSTRUCTIONS -       Reason ACE/ARB/ARNI order not selected       Reason beta blocker order not selected       Kristi Armas PA-C

## 2018-05-15 NOTE — PROGRESS NOTES
"  Advanced Heart Failure and Transplant Cardiology  Progress Note:    Assessment and Plan:  63 year old male with a PMH of CAD, ICM (EF of 15-20%), ESRD, bicuspid aortic valve with AI, severe MR, and proximal AAA who was admitted for decompensated heart failure. He is on dobutamine 2.5 mcg/kg/min and listed for heart/kidney transplant as status 1A.      Today's Plan:   - no major changes  - HD  - add hydralazine tomorrow for afterload on non-dialysis days    # Stage D NYHA Class III systolic heart failure secondary to ICM, listed 1A  # Severe mitral regurgitation  # Severe holodiastolic aortic insufficiency   - continue dobutamine 2.5 mcg/kg/min and Dopamine 2.5 mcg/kg/min  Sleep apnea: yes, unable to tolerate CPAP  - review at meeting this week  - hydralazine 10 mg TID on non-dialysis days  - weekly CXR (next 5/21)       # Chronic Medical Issues:  - Seborrheic keratoses / Dermatofibromas / Multiple benign nevi / Nummular dermatitis: derm consulted, all lesions benign  - Hx of allergic rhinitis: cetrizine  - ESRD: Dialysis T, Th, Sa   - Non-obstructive CAD, Anomalous RCA: ASA 81 mg, atorvastatin 40 mg daily  - Ascending aortic aneurysm: 4.5 x 4.5 cm proximal ascending aortic aneurysm seen on MRI 2/14  - Multiple benign branch duct-IPMNs: no concerning features affecting transplant candidacy      FEN: regular diet, 2L FR  PROPHY: ambulation  LINES: PICC  DISPO: TBD  CODE STATUS:  Full  ================================================================    Subjective/24-Hr Events:   - no acute issues overnight  - happy to get a shower this morning    ROS:  4 point ROS including respiratory, CV, GI and  (other than that noted in the HPI) is negative.     Medications: Reviewed in EPIC.     Physical Exam:   /71  Pulse 107  Temp 97.9  F (36.6  C) (Oral)  Resp 18  Ht 1.727 m (5' 8\")  Wt 78.7 kg (173 lb 8 oz)  SpO2 100%  BMI 26.38 kg/m2  GENERAL: A&O x 3, NAD  NECK: no JVD, L-sided [place] in place bandaged and " c/d/i  CV: Mildly tachycardic, +S1S2, soft murmur across precordium  RESPIRATORY: CTAB  GI: soft, NT, ND  EXTREMITIES: No peripheral edema. 2+ bilateral pedal pulses. Warm.   NEUROLOGIC: Alert and oriented x 3. No focal deficits.   SKIN: No jaundice. No rashes or lesions.     Labs / Imaging:  - all labs and imaging reviewed

## 2018-05-15 NOTE — PLAN OF CARE
Transplant Teaching:  Today I met with Mr Nicholson, his son, Jordi, his friend Naresh, and his neighbor, Esperanza to do transplant teaching.  We reviewed the candidate selection process, insurance prior authorization, UNOS statuses, the waiting period, donor issues, and the pre-, daniella-, and post-op periods.  We also discussed the major potential risks of transplant including rejection, infection, transplant vasculopathy, and malignancy.  The patient was previously given UNOS paperwork; today, we also reviewed the [email or web address] website, and encouraged Murray's support team to use this as another resource for learning.  They were also invited to attend the Transplant Support Group in the future if interested.      Will continue to provide re-enforcement teaching as needed.

## 2018-05-16 LAB
ABO + RH BLD: NORMAL
ABO + RH BLD: NORMAL
BASE EXCESS BLDV CALC-SCNC: 8.2 MMOL/L
BASE EXCESS BLDV CALC-SCNC: 8.4 MMOL/L
BLD GP AB SCN SERPL QL: NORMAL
BLOOD BANK CMNT PATIENT-IMP: NORMAL
GLUCOSE BLDC GLUCOMTR-MCNC: 134 MG/DL (ref 70–99)
GLUCOSE BLDC GLUCOMTR-MCNC: 224 MG/DL (ref 70–99)
GLUCOSE BLDC GLUCOMTR-MCNC: 240 MG/DL (ref 70–99)
GLUCOSE BLDC GLUCOMTR-MCNC: 74 MG/DL (ref 70–99)
GLUCOSE BLDC GLUCOMTR-MCNC: 82 MG/DL (ref 70–99)
HCO3 BLDV-SCNC: 33 MMOL/L (ref 21–28)
HCO3 BLDV-SCNC: 33 MMOL/L (ref 21–28)
O2/TOTAL GAS SETTING VFR VENT: ABNORMAL %
O2/TOTAL GAS SETTING VFR VENT: ABNORMAL %
OXYHGB MFR BLDV: 43 %
OXYHGB MFR BLDV: 46 %
PCO2 BLDV: 45 MM HG (ref 40–50)
PCO2 BLDV: 45 MM HG (ref 40–50)
PH BLDV: 7.47 PH (ref 7.32–7.43)
PH BLDV: 7.47 PH (ref 7.32–7.43)
PO2 BLDV: 26 MM HG (ref 25–47)
PO2 BLDV: 28 MM HG (ref 25–47)
SPECIMEN EXP DATE BLD: NORMAL

## 2018-05-16 PROCEDURE — A9270 NON-COVERED ITEM OR SERVICE: HCPCS | Mod: GY | Performed by: NURSE PRACTITIONER

## 2018-05-16 PROCEDURE — 25000132 ZZH RX MED GY IP 250 OP 250 PS 637: Performed by: NURSE PRACTITIONER

## 2018-05-16 PROCEDURE — 94640 AIRWAY INHALATION TREATMENT: CPT

## 2018-05-16 PROCEDURE — 40000196 ZZH STATISTIC RAPCV CVP MONITORING

## 2018-05-16 PROCEDURE — 82805 BLOOD GASES W/O2 SATURATION: CPT | Performed by: INTERNAL MEDICINE

## 2018-05-16 PROCEDURE — 25000132 ZZH RX MED GY IP 250 OP 250 PS 637: Performed by: INTERNAL MEDICINE

## 2018-05-16 PROCEDURE — 40000275 ZZH STATISTIC RCP TIME EA 10 MIN

## 2018-05-16 PROCEDURE — 25000128 H RX IP 250 OP 636: Performed by: INTERNAL MEDICINE

## 2018-05-16 PROCEDURE — A9270 NON-COVERED ITEM OR SERVICE: HCPCS | Mod: GY | Performed by: STUDENT IN AN ORGANIZED HEALTH CARE EDUCATION/TRAINING PROGRAM

## 2018-05-16 PROCEDURE — 40000048 ZZH STATISTIC DAILY SWAN MONITORING

## 2018-05-16 PROCEDURE — 86850 RBC ANTIBODY SCREEN: CPT | Performed by: INTERNAL MEDICINE

## 2018-05-16 PROCEDURE — 99232 SBSQ HOSP IP/OBS MODERATE 35: CPT | Mod: GC | Performed by: INTERNAL MEDICINE

## 2018-05-16 PROCEDURE — 20000004 ZZH R&B ICU UMMC

## 2018-05-16 PROCEDURE — 86900 BLOOD TYPING SEROLOGIC ABO: CPT | Performed by: INTERNAL MEDICINE

## 2018-05-16 PROCEDURE — 00000146 ZZHCL STATISTIC GLUCOSE BY METER IP

## 2018-05-16 PROCEDURE — 25000128 H RX IP 250 OP 636: Performed by: STUDENT IN AN ORGANIZED HEALTH CARE EDUCATION/TRAINING PROGRAM

## 2018-05-16 PROCEDURE — A9270 NON-COVERED ITEM OR SERVICE: HCPCS | Mod: GY | Performed by: INTERNAL MEDICINE

## 2018-05-16 PROCEDURE — 86901 BLOOD TYPING SEROLOGIC RH(D): CPT | Performed by: INTERNAL MEDICINE

## 2018-05-16 PROCEDURE — 25000132 ZZH RX MED GY IP 250 OP 250 PS 637: Mod: GY | Performed by: STUDENT IN AN ORGANIZED HEALTH CARE EDUCATION/TRAINING PROGRAM

## 2018-05-16 PROCEDURE — 25000125 ZZHC RX 250: Performed by: STUDENT IN AN ORGANIZED HEALTH CARE EDUCATION/TRAINING PROGRAM

## 2018-05-16 RX ORDER — HYDRALAZINE HYDROCHLORIDE 10 MG/1
10 TABLET, FILM COATED ORAL 4 TIMES DAILY
Status: COMPLETED | OUTPATIENT
Start: 2018-05-16 | End: 2018-05-16

## 2018-05-16 RX ADMIN — ALLOPURINOL 100 MG: 100 TABLET ORAL at 07:33

## 2018-05-16 RX ADMIN — DOBUTAMINE HYDROCHLORIDE 2.5 MCG/KG/MIN: 400 INJECTION INTRAVENOUS at 13:58

## 2018-05-16 RX ADMIN — INSULIN ASPART 4 UNITS: 100 INJECTION, SOLUTION INTRAVENOUS; SUBCUTANEOUS at 12:10

## 2018-05-16 RX ADMIN — ATORVASTATIN CALCIUM 40 MG: 40 TABLET, FILM COATED ORAL at 20:05

## 2018-05-16 RX ADMIN — INSULIN GLARGINE 16 UNITS: 100 INJECTION, SOLUTION SUBCUTANEOUS at 22:31

## 2018-05-16 RX ADMIN — HYDRALAZINE HYDROCHLORIDE 10 MG: 10 TABLET, FILM COATED ORAL at 07:33

## 2018-05-16 RX ADMIN — CALCIUM ACETATE 667 MG: 667 CAPSULE ORAL at 12:06

## 2018-05-16 RX ADMIN — SENNOSIDES AND DOCUSATE SODIUM 1 TABLET: 8.6; 5 TABLET ORAL at 20:05

## 2018-05-16 RX ADMIN — Medication 1 CAPSULE: at 07:33

## 2018-05-16 RX ADMIN — FLUTICASONE PROPIONATE 2 SPRAY: 50 SPRAY, METERED NASAL at 20:06

## 2018-05-16 RX ADMIN — OMEPRAZOLE 20 MG: 20 CAPSULE, DELAYED RELEASE ORAL at 20:04

## 2018-05-16 RX ADMIN — ASPIRIN 81 MG CHEWABLE TABLET 81 MG: 81 TABLET CHEWABLE at 20:04

## 2018-05-16 RX ADMIN — CALCIUM ACETATE 667 MG: 667 CAPSULE ORAL at 07:33

## 2018-05-16 RX ADMIN — CALCIUM ACETATE 667 MG: 667 CAPSULE ORAL at 18:03

## 2018-05-16 RX ADMIN — HYDRALAZINE HYDROCHLORIDE 10 MG: 10 TABLET, FILM COATED ORAL at 16:57

## 2018-05-16 RX ADMIN — SENNOSIDES AND DOCUSATE SODIUM 1 TABLET: 8.6; 5 TABLET ORAL at 07:33

## 2018-05-16 RX ADMIN — HYDRALAZINE HYDROCHLORIDE 10 MG: 10 TABLET, FILM COATED ORAL at 22:32

## 2018-05-16 RX ADMIN — DOPAMINE HYDROCHLORIDE IN DEXTROSE 2.5 MCG/KG/MIN: 1.6 INJECTION, SOLUTION INTRAVENOUS at 13:59

## 2018-05-16 RX ADMIN — HYDRALAZINE HYDROCHLORIDE 10 MG: 10 TABLET, FILM COATED ORAL at 12:05

## 2018-05-16 RX ADMIN — ALBUTEROL SULFATE 2.5 MG: 2.5 SOLUTION RESPIRATORY (INHALATION) at 22:34

## 2018-05-16 RX ADMIN — CETIRIZINE HYDROCHLORIDE 10 MG: 10 TABLET, FILM COATED ORAL at 07:33

## 2018-05-16 RX ADMIN — VITAMIN B12 0.1 MG ORAL TABLET 100 MCG: 0.1 TABLET ORAL at 08:46

## 2018-05-16 NOTE — PLAN OF CARE
Problem: Patient Care Overview  Goal: Individualization & Mutuality  Status one A patient who remains inpatient with PAC placement and dual inotrope's. Hemodynamically stable on Dobutamine 2.5 mcg/kg/min and Dopamine 2.5 mcg/kg/min. Please see flow sheet for details regarding hemodynamics and calculated BECKA cardiac output.

## 2018-05-16 NOTE — PROGRESS NOTES
"  Advanced Heart Failure and Transplant Cardiology  Progress Note:    Assessment and Plan:  63 year old male with a PMH of CAD, ICM (EF of 15-20%), ESRD, bicuspid aortic valve with AI, severe MR, and proximal AAA who was admitted for decompensated heart failure. He is on dobutamine 2.5 mcg/kg/min and listed for heart/kidney transplant as status 1A.      Today's Plan:   - no major changes  - hydralazine today    # Stage D NYHA Class III systolic heart failure secondary to ICM, listed 1A  # Severe mitral regurgitation  # Severe holodiastolic aortic insufficiency   - continue dobutamine 2.5 mcg/kg/min, dopamine 2.5 mcg/kg/min  Sleep apnea: yes, unable to tolerate CPAP  - review at meeting this week  - hydralazine 10 mg q6 hours on non-dialysis days  - weekly CXR (next 5/21)       # Chronic Medical Issues:  - Seborrheic keratoses / Dermatofibromas / Multiple benign nevi / Nummular dermatitis: derm consulted, all lesions benign  - Hx of allergic rhinitis: cetrizine  - ESRD: Dialysis T, Th, Sa   - Non-obstructive CAD, Anomalous RCA: ASA 81 mg, atorvastatin 40 mg daily  - Ascending aortic aneurysm: 4.5 x 4.5 cm proximal ascending aortic aneurysm seen on MRI 2/14  - Multiple benign branch duct-IPMNs: no concerning features affecting transplant candidacy      FEN: regular diet, 2L FR  PROPHY: ambulation  LINES: PICC  DISPO: TBD  CODE STATUS:  Full  ================================================================    Subjective/24-Hr Events:   - no acute issues overnight  - mild nausea improved with Ativan    ROS:  4 point ROS including respiratory, CV, GI and  (other than that noted in the HPI) is negative.     Medications: Reviewed in EPIC.     Physical Exam:   BP 94/61  Pulse 107  Temp 98.7  F (37.1  C) (Oral)  Resp 18  Ht 1.727 m (5' 8\")  Wt 78.7 kg (173 lb 8 oz)  SpO2 98%  BMI 26.38 kg/m2  GENERAL: A&O x 3, NAD  NECK: no JVD, L-sided [place] in place bandaged and c/d/i  CV: Mildly tachycardic, +S1S2, soft murmur " across precordium  RESPIRATORY: CTAB  GI: soft, NT, ND  EXTREMITIES: No peripheral edema. 2+ bilateral pedal pulses. Warm.   NEUROLOGIC: Alert and oriented x 3. No focal deficits.   SKIN: No jaundice. No rashes or lesions.     Labs / Imaging:  - all labs and imaging reviewed

## 2018-05-16 NOTE — PLAN OF CARE
Problem: Cardiac: Heart Failure (Adult)  Goal: Signs and Symptoms of Listed Potential Problems Will be Absent, Minimized or Managed (Cardiac: Heart Failure)  Signs and symptoms of listed potential problems will be absent, minimized or managed by discharge/transition of care (reference Cardiac: Heart Failure (Adult) CPG).   Outcome: No Change  A/Ox4. VSS on 2 LPM NC. Per orders, do not wake overnight. One time order of ativan given for nausea with some relief. Sinus tach 100's-110's with rare PVC. Continues on dopamine at 2.5 mcg/kg/min and dobutamine at 2.5 mcg/kg/min. Poor appetite. Anuric. Small BM x1. Continue with POC. Notify Cards 2 with concerns.

## 2018-05-17 ENCOUNTER — DOCUMENTATION ONLY (OUTPATIENT)
Dept: TRANSPLANT | Facility: CLINIC | Age: 63
End: 2018-05-17

## 2018-05-17 LAB
ANION GAP SERPL CALCULATED.3IONS-SCNC: 8 MMOL/L (ref 3–14)
BASE EXCESS BLDV CALC-SCNC: 6.2 MMOL/L
BASE EXCESS BLDV CALC-SCNC: 8.1 MMOL/L
BUN SERPL-MCNC: 26 MG/DL (ref 7–30)
CALCIUM SERPL-MCNC: 8.6 MG/DL (ref 8.5–10.1)
CHLORIDE SERPL-SCNC: 99 MMOL/L (ref 94–109)
CO2 SERPL-SCNC: 28 MMOL/L (ref 20–32)
CREAT SERPL-MCNC: 6.56 MG/DL (ref 0.66–1.25)
ERYTHROCYTE [DISTWIDTH] IN BLOOD BY AUTOMATED COUNT: 16 % (ref 10–15)
GFR SERPL CREATININE-BSD FRML MDRD: 9 ML/MIN/1.7M2
GLUCOSE BLDC GLUCOMTR-MCNC: 103 MG/DL (ref 70–99)
GLUCOSE BLDC GLUCOMTR-MCNC: 152 MG/DL (ref 70–99)
GLUCOSE BLDC GLUCOMTR-MCNC: 168 MG/DL (ref 70–99)
GLUCOSE BLDC GLUCOMTR-MCNC: 233 MG/DL (ref 70–99)
GLUCOSE BLDC GLUCOMTR-MCNC: 82 MG/DL (ref 70–99)
GLUCOSE SERPL-MCNC: 85 MG/DL (ref 70–99)
HCO3 BLDV-SCNC: 31 MMOL/L (ref 21–28)
HCO3 BLDV-SCNC: 33 MMOL/L (ref 21–28)
HCT VFR BLD AUTO: 28 % (ref 40–53)
HGB BLD-MCNC: 9 G/DL (ref 13.3–17.7)
MAGNESIUM SERPL-MCNC: 1.6 MG/DL (ref 1.6–2.3)
MCH RBC QN AUTO: 30.2 PG (ref 26.5–33)
MCHC RBC AUTO-ENTMCNC: 32.1 G/DL (ref 31.5–36.5)
MCV RBC AUTO: 94 FL (ref 78–100)
O2/TOTAL GAS SETTING VFR VENT: 21 %
O2/TOTAL GAS SETTING VFR VENT: ABNORMAL %
OXYHGB MFR BLDV: 46 %
OXYHGB MFR BLDV: 53 %
PCO2 BLDV: 43 MM HG (ref 40–50)
PCO2 BLDV: 46 MM HG (ref 40–50)
PH BLDV: 7.46 PH (ref 7.32–7.43)
PH BLDV: 7.46 PH (ref 7.32–7.43)
PLATELET # BLD AUTO: 195 10E9/L (ref 150–450)
PO2 BLDV: 27 MM HG (ref 25–47)
PO2 BLDV: 30 MM HG (ref 25–47)
POTASSIUM SERPL-SCNC: 4 MMOL/L (ref 3.4–5.3)
RBC # BLD AUTO: 2.98 10E12/L (ref 4.4–5.9)
SODIUM SERPL-SCNC: 134 MMOL/L (ref 133–144)
WBC # BLD AUTO: 5.1 10E9/L (ref 4–11)

## 2018-05-17 PROCEDURE — 40000196 ZZH STATISTIC RAPCV CVP MONITORING

## 2018-05-17 PROCEDURE — 40000048 ZZH STATISTIC DAILY SWAN MONITORING

## 2018-05-17 PROCEDURE — 25000132 ZZH RX MED GY IP 250 OP 250 PS 637: Mod: GY | Performed by: INTERNAL MEDICINE

## 2018-05-17 PROCEDURE — A9270 NON-COVERED ITEM OR SERVICE: HCPCS | Mod: GY | Performed by: STUDENT IN AN ORGANIZED HEALTH CARE EDUCATION/TRAINING PROGRAM

## 2018-05-17 PROCEDURE — 25000132 ZZH RX MED GY IP 250 OP 250 PS 637: Performed by: NURSE PRACTITIONER

## 2018-05-17 PROCEDURE — 83735 ASSAY OF MAGNESIUM: CPT | Performed by: NURSE PRACTITIONER

## 2018-05-17 PROCEDURE — 94640 AIRWAY INHALATION TREATMENT: CPT | Mod: 76

## 2018-05-17 PROCEDURE — 99232 SBSQ HOSP IP/OBS MODERATE 35: CPT | Mod: GC | Performed by: INTERNAL MEDICINE

## 2018-05-17 PROCEDURE — 82805 BLOOD GASES W/O2 SATURATION: CPT | Performed by: INTERNAL MEDICINE

## 2018-05-17 PROCEDURE — A9270 NON-COVERED ITEM OR SERVICE: HCPCS | Mod: GY | Performed by: INTERNAL MEDICINE

## 2018-05-17 PROCEDURE — 00000146 ZZHCL STATISTIC GLUCOSE BY METER IP

## 2018-05-17 PROCEDURE — 25000132 ZZH RX MED GY IP 250 OP 250 PS 637: Performed by: STUDENT IN AN ORGANIZED HEALTH CARE EDUCATION/TRAINING PROGRAM

## 2018-05-17 PROCEDURE — 63400005 ZZH RX 634: Performed by: INTERNAL MEDICINE

## 2018-05-17 PROCEDURE — 80048 BASIC METABOLIC PNL TOTAL CA: CPT | Performed by: NURSE PRACTITIONER

## 2018-05-17 PROCEDURE — 25000125 ZZHC RX 250: Performed by: STUDENT IN AN ORGANIZED HEALTH CARE EDUCATION/TRAINING PROGRAM

## 2018-05-17 PROCEDURE — 25000128 H RX IP 250 OP 636: Performed by: INTERNAL MEDICINE

## 2018-05-17 PROCEDURE — A9270 NON-COVERED ITEM OR SERVICE: HCPCS | Mod: GY | Performed by: NURSE PRACTITIONER

## 2018-05-17 PROCEDURE — 25000125 ZZHC RX 250: Performed by: NURSE PRACTITIONER

## 2018-05-17 PROCEDURE — 85027 COMPLETE CBC AUTOMATED: CPT | Performed by: NURSE PRACTITIONER

## 2018-05-17 PROCEDURE — 20000004 ZZH R&B ICU UMMC

## 2018-05-17 PROCEDURE — 40000275 ZZH STATISTIC RCP TIME EA 10 MIN

## 2018-05-17 PROCEDURE — 90937 HEMODIALYSIS REPEATED EVAL: CPT

## 2018-05-17 RX ORDER — HYDRALAZINE HYDROCHLORIDE 10 MG/1
10 TABLET, FILM COATED ORAL
Status: DISCONTINUED | OUTPATIENT
Start: 2018-05-18 | End: 2018-05-18

## 2018-05-17 RX ORDER — DOXERCALCIFEROL 4 UG/2ML
0.5 INJECTION INTRAVENOUS
Status: COMPLETED | OUTPATIENT
Start: 2018-05-17 | End: 2018-05-17

## 2018-05-17 RX ORDER — HEPARIN SODIUM 1000 [USP'U]/ML
500 INJECTION, SOLUTION INTRAVENOUS; SUBCUTANEOUS
Status: COMPLETED | OUTPATIENT
Start: 2018-05-17 | End: 2018-05-17

## 2018-05-17 RX ORDER — HEPARIN SODIUM 1000 [USP'U]/ML
500 INJECTION, SOLUTION INTRAVENOUS; SUBCUTANEOUS CONTINUOUS
Status: DISCONTINUED | OUTPATIENT
Start: 2018-05-17 | End: 2018-05-17

## 2018-05-17 RX ORDER — HYDRALAZINE HYDROCHLORIDE 10 MG/1
10 TABLET, FILM COATED ORAL
Status: DISCONTINUED | OUTPATIENT
Start: 2018-05-17 | End: 2018-05-18

## 2018-05-17 RX ADMIN — HEPARIN SODIUM 500 UNITS: 1000 INJECTION, SOLUTION INTRAVENOUS; SUBCUTANEOUS at 09:39

## 2018-05-17 RX ADMIN — CALCIUM ACETATE 667 MG: 667 CAPSULE ORAL at 17:37

## 2018-05-17 RX ADMIN — FLUTICASONE PROPIONATE 2 SPRAY: 50 SPRAY, METERED NASAL at 19:44

## 2018-05-17 RX ADMIN — EPOETIN ALFA 4000 UNITS: 10000 SOLUTION INTRAVENOUS; SUBCUTANEOUS at 09:42

## 2018-05-17 RX ADMIN — INSULIN GLARGINE 12 UNITS: 100 INJECTION, SOLUTION SUBCUTANEOUS at 21:57

## 2018-05-17 RX ADMIN — DOXERCALCIFEROL 0.5 MCG: 4 INJECTION, SOLUTION INTRAVENOUS at 09:53

## 2018-05-17 RX ADMIN — ALBUTEROL SULFATE 2.5 MG: 2.5 SOLUTION RESPIRATORY (INHALATION) at 22:23

## 2018-05-17 RX ADMIN — OMEPRAZOLE 20 MG: 20 CAPSULE, DELAYED RELEASE ORAL at 19:42

## 2018-05-17 RX ADMIN — ATORVASTATIN CALCIUM 40 MG: 40 TABLET, FILM COATED ORAL at 19:42

## 2018-05-17 RX ADMIN — HYDRALAZINE HYDROCHLORIDE 10 MG: 10 TABLET, FILM COATED ORAL at 19:42

## 2018-05-17 RX ADMIN — SODIUM CHLORIDE 250 ML: 9 INJECTION, SOLUTION INTRAVENOUS at 09:41

## 2018-05-17 RX ADMIN — INSULIN ASPART 2 UNITS: 100 INJECTION, SOLUTION INTRAVENOUS; SUBCUTANEOUS at 13:56

## 2018-05-17 RX ADMIN — SENNOSIDES AND DOCUSATE SODIUM 1 TABLET: 8.6; 5 TABLET ORAL at 19:42

## 2018-05-17 RX ADMIN — INSULIN ASPART 1 UNITS: 100 INJECTION, SOLUTION INTRAVENOUS; SUBCUTANEOUS at 18:48

## 2018-05-17 RX ADMIN — CETIRIZINE HYDROCHLORIDE 10 MG: 10 TABLET, FILM COATED ORAL at 07:37

## 2018-05-17 RX ADMIN — CALCIUM ACETATE 667 MG: 667 CAPSULE ORAL at 07:37

## 2018-05-17 RX ADMIN — SENNOSIDES AND DOCUSATE SODIUM 1 TABLET: 8.6; 5 TABLET ORAL at 07:37

## 2018-05-17 RX ADMIN — VITAMIN B12 0.1 MG ORAL TABLET 100 MCG: 0.1 TABLET ORAL at 11:32

## 2018-05-17 RX ADMIN — SODIUM CHLORIDE 300 ML: 9 INJECTION, SOLUTION INTRAVENOUS at 09:40

## 2018-05-17 RX ADMIN — ASPIRIN 81 MG CHEWABLE TABLET 81 MG: 81 TABLET CHEWABLE at 19:42

## 2018-05-17 RX ADMIN — HYDRALAZINE HYDROCHLORIDE 10 MG: 10 TABLET, FILM COATED ORAL at 14:04

## 2018-05-17 RX ADMIN — Medication 1 CAPSULE: at 07:37

## 2018-05-17 RX ADMIN — Medication 2 G: at 19:44

## 2018-05-17 RX ADMIN — ALLOPURINOL 100 MG: 100 TABLET ORAL at 07:37

## 2018-05-17 RX ADMIN — HEPARIN SODIUM 500 UNITS/HR: 1000 INJECTION, SOLUTION INTRAVENOUS; SUBCUTANEOUS at 09:40

## 2018-05-17 NOTE — PLAN OF CARE
Problem: Patient Care Overview  Goal: Plan of Care/Patient Progress Review  Outcome: No Change  D: HF/ low EF/ status 1a for heart txp  I/A: A/o x4. On ra, O2 sats >95%. Denies SOB. Dialysis in room, 2.5L off. Up to chair independently. Hydralazine added for elevated MAPs during/ after HD today. BECKA q12h at 0600/1800. 1800 SVO2 up to 53, CI up to 2.4. SVT 1200. Pt denies pain.   P: Continue POC, continue hemodynamic monitoring and status 1A, awaiting heart/kidney.

## 2018-05-17 NOTE — PLAN OF CARE
Problem: Patient Care Overview  Goal: Individualization & Mutuality    Pt continues status 1A for heart/kidney. Independent w/ cares and in good spirits. Undisturbed overnight per order. Continues on dopamine 2.5 and dobutamine 2.5. See flowsheets for VS/hemodynamics. Anuric, HD today. Continue to monitor, update team w/ changes.

## 2018-05-17 NOTE — PLAN OF CARE
Problem: Patient Care Overview  Goal: Plan of Care/Patient Progress Review  OT/4E: Cancel- pt refusing. Pt upset that no therapist checked in with him yesterday morning for scheduled time. Will reschedule for tomorrow morning per pt request.

## 2018-05-17 NOTE — PROGRESS NOTES
"  Nephrology Progress Note  05/17/2018         Assessment & Recommendations:   Murray Nicholson is a 63 yr old male with PMH of HTN, DMII, functionally bicuspid aortic valve, CHF/CM (EF 10-15%), ESRD, admitted with worsening dyspnea/SOB, now on dobutamine drip and being evaluated for heart/kidney transplant.      ESKD: due to DMII, on PD since Aug 2017, changed to iHD 1/2018 due to polymicrobial and fungal peritonitis, now on TTS schedule at [facility] under care of Dr Mcpherson. Access: tunneled RIJ (replaced 4/17/18). Run time: 4 hrs; EDW 77 kg.  - Dialysis per TTS schedule   - Using low dose heparin on HD  - Heparin lock CVC          Volume: 77 kg, RHC 5/1 (done at 77 kg): RA 3, PCW 15; CVP 7   - Daily standing weights please     Severe AV and MR insufficiency:   BP/congestive CM (EF 10-15%); minimal CAD per angio on 2/8/17. Chronically hypotensive with tachycardia  - Goal MAP > 65 and SBP > 95 while dialyzing  - On dobutamine 2.5 mcg/kg/min (started 4/20/18)  - Listed for heart/kidney tx, in ICU on dobutamine and dopamine         Anemia: hgb 9.0, labs 5/8: ferritin 621, IS 27, iron 58; on maintenance venofer 50 mg qTuesday   - Continue venofer qTues  - On epogen 4000 units per HD      BMD: calcium 8.6, alb 3.5, phos 3.6; recent ; Vit D 27; on hectorol 0.5 mcg via HD; on phoslo 1 tab TID with meals.   - Continue hectorol via HD  - Continue Phoslo          Recommendations were communicated to primary team via this note.       FANTA GarciaC  Division of Kidney Disease  [phone]    Interval History :   Seen on dialysis, stable run to dry weight. Denies CP, SOB, chills, n/v.    Review of Systems:   4 point ROS negative other than as noted above.    Physical Exam:   I/O last 3 completed shifts:  In: 1349.3 [P.O.:1080; I.V.:269.3]  Out: 250 [Urine:250]   /71 (BP Location: Right arm)  Pulse 107  Temp 98  F (36.7  C) (Oral)  Resp 18  Ht 1.727 m (5' 8\")  Wt 77.7 kg (171 lb 3.2 oz)  SpO2 98%  " BMI 26.03 kg/m2     GENERAL APPEARANCE: alert, NAD  EYES: no scleral icterus, pupils equal   Pulmonary: lungs clear to auscultation with equal breath sounds bilaterally; has swan  CV: regular rhythm, tachycardia at baseline   - Edema trace  GI: soft, nontender, normal bowel sounds  MS: no evidence of inflammation in joints, no muscle tenderness  : no doyle  SKIN: no rash, warm, dry, no cyanosis  NEURO: face symmetric, no asterixis   Access: tunneled [facility]       Labs:   All labs reviewed by me  Electrolytes/Renal -   Recent Labs   Lab Test  05/17/18   0602  05/15/18   0402  05/12/18   0656   05/01/18   0930   04/21/18   0640   NA  134  135  137   < >  133   < >  135   POTASSIUM  4.0  4.6  4.6   < >  5.0   < >  4.0   CHLORIDE  99  98  98   < >  94   < >  96   CO2  28  31  30   < >  31   < >  24   BUN  26  34*  28   < >  36*   < >  41*   CR  6.56*  8.18*  7.07*   < >  8.51*   < >  7.72*   GLC  85  88  125*   < >  100*   < >  111*   TRINI  8.6  9.8  9.6   < >  9.7   < >  9.2   MAG  1.6  2.0  2.4*   < >  2.4*   < >  1.6   PHOS   --   3.6   --    --   3.2   --   5.4*    < > = values in this interval not displayed.       CBC -   Recent Labs   Lab Test  05/17/18   0602  05/15/18   0402  05/14/18   0853   WBC  5.1  5.6  6.2   HGB  9.0*  8.9*  9.4*   PLT  195  244  249       LFTs -   Recent Labs   Lab Test  04/16/18   1546  03/07/18   0833  02/19/18   1558  02/02/18   1736   ALKPHOS  123  129  102  86   BILITOTAL  0.8  0.7  0.6  0.4   ALT  22  21  15  16   AST  17  18  18  20   PROTTOTAL  7.4   --   7.2  6.2*   ALBUMIN  3.5   --   2.7*  2.1*       Iron Panel -   Recent Labs   Lab Test  05/08/18   0954  07/19/17   1306  07/05/17   1204   IRON  58  46  26*   IRONSAT  27  18  12*   ALBERTINA  621*  369  542*         Imaging:  Reviewed    Current Medications:    sodium chloride 0.9%  250 mL Intravenous Once in dialysis     sodium chloride 0.9%  300 mL Hemodialysis Machine Once     albuterol  2.5 mg Nebulization At Bedtime     allopurinol   100 mg Oral Daily     aspirin  81 mg Oral Daily     atorvastatin  40 mg Oral Daily     calcium acetate  667 mg Oral TID w/meals     cetirizine  10 mg Oral Daily     cyanocolbalamin  100 mcg Oral Daily     doxercalciferol  0.5 mcg Intravenous Once in dialysis     epoetin emily (EPOGEN,PROCRIT) inj ESRD  4,000 Units Intravenous Once in dialysis     fluticasone  1-2 spray Both Nostrils Daily     gelatin absorbable  1 each Topical During Hemodialysis (from stock)     heparin (porcine)  500 Units Hemodialysis Machine Once in dialysis     heparin  3 mL Intracatheter During Hemodialysis (from stock)     heparin  3 mL Intracatheter During Hemodialysis (from stock)     insulin aspart  1-10 Units Subcutaneous TID AC     insulin aspart  1-7 Units Subcutaneous At Bedtime     insulin glargine  16 Units Subcutaneous At Bedtime     NEPHROCAPS  1 capsule Oral Daily     omeprazole  20 mg Oral Daily     polyethylene glycol  17 g Oral Daily     senna-docusate  1 tablet Oral BID     sodium chloride (PF)  10 mL Intracatheter Q7 Days     sodium chloride (PF)  3 mL Intracatheter Q8H     sodium chloride (PF)  3 mL Intracatheter During Hemodialysis (from stock)     sodium chloride (PF)  3 mL Intracatheter During Hemodialysis (from stock)       DOBUTamine 2.5 mcg/kg/min (05/17/18 0900)     DOPamine 2.5 mcg/kg/min (05/17/18 0900)     heparin (porcine) 500 Units/hr (05/08/18 0900)     heparin (porcine) 500 Units/hr (05/12/18 1041)     heparin (porcine)       - MEDICATION INSTRUCTIONS -       Reason ACE/ARB/ARNI order not selected       Reason beta blocker order not selected       Kristi Armas PA-C

## 2018-05-17 NOTE — PROGRESS NOTES
"  Advanced Heart Failure and Transplant Cardiology  Progress Note:    Assessment and Plan:  63 year old male with a PMH of CAD, ICM (EF of 15-20%), ESRD, bicuspid aortic valve with AI, severe MR, and proximal AAA who was admitted for decompensated heart failure. He is on dobutamine 2.5 mcg/kg/min and listed for heart/kidney transplant as status 1A.      Today's Plan:   - no major changes    # Stage D NYHA Class III systolic heart failure secondary to ICM, listed 1A  # Severe mitral regurgitation  # Severe holodiastolic aortic insufficiency   - continue dobutamine 2.5 mcg/kg/min, dopamine 2.5 mcg/kg/min  Sleep apnea: yes, unable to tolerate CPAP  - review at meeting this week  - hydralazine 10 mg q6 hours on non-dialysis days + BID on dialysis days (after HD)  - weekly CXR (next 5/21)       # Chronic Medical Issues:  - Seborrheic keratoses / Dermatofibromas / Multiple benign nevi / Nummular dermatitis: derm consulted, all lesions benign  - Hx of allergic rhinitis: cetrizine  - ESRD: Dialysis T, Th, Sa   - Non-obstructive CAD, Anomalous RCA: ASA 81 mg, atorvastatin 40 mg daily  - Ascending aortic aneurysm: 4.5 x 4.5 cm proximal ascending aortic aneurysm seen on MRI 2/14  - Multiple benign branch duct-IPMNs: no concerning features affecting transplant candidacy      FEN: regular diet, 2L FR  PROPHY: ambulation  LINES: PICC  DISPO: TBD  CODE STATUS:  Full  ================================================================    Subjective/24-Hr Events:   - no acute issues overnight    ROS:  4 point ROS including respiratory, CV, GI and  (other than that noted in the HPI) is negative.     Medications: Reviewed in EPIC.     Physical Exam:   /81  Pulse 107  Temp 98.1  F (36.7  C)  Resp 18  Ht 1.727 m (5' 8\")  Wt 77.7 kg (171 lb 3.2 oz)  SpO2 99%  BMI 26.03 kg/m2  GENERAL: A&O x 3, NAD  NECK: no JVD, L-sided [place] in place bandaged and c/d/i  CV: Mildly tachycardic, +S1S2, soft murmur across " precordium  RESPIRATORY: CTAB  GI: soft, NT, ND  EXTREMITIES: No peripheral edema. 2+ bilateral pedal pulses. Warm.   NEUROLOGIC: Alert and oriented x 3. No focal deficits.   SKIN: No jaundice. No rashes or lesions.     Labs / Imaging:  - all labs and imaging reviewed

## 2018-05-18 ENCOUNTER — APPOINTMENT (OUTPATIENT)
Dept: OCCUPATIONAL THERAPY | Facility: CLINIC | Age: 63
DRG: 001 | End: 2018-05-18
Attending: INTERNAL MEDICINE
Payer: COMMERCIAL

## 2018-05-18 LAB
BASE EXCESS BLDV CALC-SCNC: 1.7 MMOL/L
BASE EXCESS BLDV CALC-SCNC: 4.4 MMOL/L
GLUCOSE BLDC GLUCOMTR-MCNC: 162 MG/DL (ref 70–99)
GLUCOSE BLDC GLUCOMTR-MCNC: 177 MG/DL (ref 70–99)
GLUCOSE BLDC GLUCOMTR-MCNC: 190 MG/DL (ref 70–99)
GLUCOSE BLDC GLUCOMTR-MCNC: 75 MG/DL (ref 70–99)
HCO3 BLDV-SCNC: 26 MMOL/L (ref 21–28)
HCO3 BLDV-SCNC: 29 MMOL/L (ref 21–28)
O2/TOTAL GAS SETTING VFR VENT: 21 %
O2/TOTAL GAS SETTING VFR VENT: 21 %
OXYHGB MFR BLDV: 44 %
OXYHGB MFR BLDV: 52 %
PCO2 BLDV: 37 MM HG (ref 40–50)
PCO2 BLDV: 40 MM HG (ref 40–50)
PH BLDV: 7.45 PH (ref 7.32–7.43)
PH BLDV: 7.46 PH (ref 7.32–7.43)
PO2 BLDV: 27 MM HG (ref 25–47)
PO2 BLDV: 30 MM HG (ref 25–47)

## 2018-05-18 PROCEDURE — 25000132 ZZH RX MED GY IP 250 OP 250 PS 637: Performed by: STUDENT IN AN ORGANIZED HEALTH CARE EDUCATION/TRAINING PROGRAM

## 2018-05-18 PROCEDURE — 40000133 ZZH STATISTIC OT WARD VISIT: Performed by: OCCUPATIONAL THERAPIST

## 2018-05-18 PROCEDURE — 25000132 ZZH RX MED GY IP 250 OP 250 PS 637: Mod: GY | Performed by: NURSE PRACTITIONER

## 2018-05-18 PROCEDURE — 25000128 H RX IP 250 OP 636: Performed by: STUDENT IN AN ORGANIZED HEALTH CARE EDUCATION/TRAINING PROGRAM

## 2018-05-18 PROCEDURE — 25000132 ZZH RX MED GY IP 250 OP 250 PS 637: Mod: GY | Performed by: INTERNAL MEDICINE

## 2018-05-18 PROCEDURE — A9270 NON-COVERED ITEM OR SERVICE: HCPCS | Mod: GY | Performed by: NURSE PRACTITIONER

## 2018-05-18 PROCEDURE — 25000132 ZZH RX MED GY IP 250 OP 250 PS 637: Performed by: NURSE PRACTITIONER

## 2018-05-18 PROCEDURE — 40000196 ZZH STATISTIC RAPCV CVP MONITORING

## 2018-05-18 PROCEDURE — 40000275 ZZH STATISTIC RCP TIME EA 10 MIN

## 2018-05-18 PROCEDURE — 97110 THERAPEUTIC EXERCISES: CPT | Mod: GO | Performed by: OCCUPATIONAL THERAPIST

## 2018-05-18 PROCEDURE — 97530 THERAPEUTIC ACTIVITIES: CPT | Mod: GO | Performed by: OCCUPATIONAL THERAPIST

## 2018-05-18 PROCEDURE — 25000132 ZZH RX MED GY IP 250 OP 250 PS 637: Mod: GY | Performed by: STUDENT IN AN ORGANIZED HEALTH CARE EDUCATION/TRAINING PROGRAM

## 2018-05-18 PROCEDURE — 25000125 ZZHC RX 250: Performed by: STUDENT IN AN ORGANIZED HEALTH CARE EDUCATION/TRAINING PROGRAM

## 2018-05-18 PROCEDURE — A9270 NON-COVERED ITEM OR SERVICE: HCPCS | Mod: GY | Performed by: INTERNAL MEDICINE

## 2018-05-18 PROCEDURE — 00000146 ZZHCL STATISTIC GLUCOSE BY METER IP

## 2018-05-18 PROCEDURE — 99232 SBSQ HOSP IP/OBS MODERATE 35: CPT | Mod: GC | Performed by: INTERNAL MEDICINE

## 2018-05-18 PROCEDURE — A9270 NON-COVERED ITEM OR SERVICE: HCPCS | Mod: GY | Performed by: STUDENT IN AN ORGANIZED HEALTH CARE EDUCATION/TRAINING PROGRAM

## 2018-05-18 PROCEDURE — 40000048 ZZH STATISTIC DAILY SWAN MONITORING

## 2018-05-18 PROCEDURE — 94640 AIRWAY INHALATION TREATMENT: CPT

## 2018-05-18 PROCEDURE — 20000004 ZZH R&B ICU UMMC

## 2018-05-18 PROCEDURE — 82805 BLOOD GASES W/O2 SATURATION: CPT | Performed by: INTERNAL MEDICINE

## 2018-05-18 RX ORDER — HYDRALAZINE HYDROCHLORIDE 25 MG/1
25 TABLET, FILM COATED ORAL
Status: DISCONTINUED | OUTPATIENT
Start: 2018-05-18 | End: 2018-05-21

## 2018-05-18 RX ORDER — HYDRALAZINE HYDROCHLORIDE 10 MG/1
10 TABLET, FILM COATED ORAL
Status: DISCONTINUED | OUTPATIENT
Start: 2018-05-19 | End: 2018-05-21

## 2018-05-18 RX ORDER — LANOLIN ALCOHOL/MO/W.PET/CERES
100 CREAM (GRAM) TOPICAL DAILY
Status: DISCONTINUED | OUTPATIENT
Start: 2018-05-19 | End: 2018-06-15

## 2018-05-18 RX ORDER — NICOTINE POLACRILEX 4 MG
15-30 LOZENGE BUCCAL
Status: DISCONTINUED | OUTPATIENT
Start: 2018-05-18 | End: 2018-05-21

## 2018-05-18 RX ORDER — DEXTROSE MONOHYDRATE 25 G/50ML
25-50 INJECTION, SOLUTION INTRAVENOUS
Status: DISCONTINUED | OUTPATIENT
Start: 2018-05-18 | End: 2018-05-21

## 2018-05-18 RX ADMIN — HYDRALAZINE HYDROCHLORIDE 25 MG: 25 TABLET ORAL at 17:56

## 2018-05-18 RX ADMIN — ALLOPURINOL 100 MG: 100 TABLET ORAL at 07:42

## 2018-05-18 RX ADMIN — HYDRALAZINE HYDROCHLORIDE 25 MG: 25 TABLET ORAL at 22:06

## 2018-05-18 RX ADMIN — LORAZEPAM 0.5 MG: 0.5 TABLET ORAL at 01:22

## 2018-05-18 RX ADMIN — DOPAMINE HYDROCHLORIDE IN DEXTROSE 2.5 MCG/KG/MIN: 1.6 INJECTION, SOLUTION INTRAVENOUS at 02:50

## 2018-05-18 RX ADMIN — SENNOSIDES AND DOCUSATE SODIUM 1 TABLET: 8.6; 5 TABLET ORAL at 20:20

## 2018-05-18 RX ADMIN — CETIRIZINE HYDROCHLORIDE 10 MG: 10 TABLET, FILM COATED ORAL at 07:42

## 2018-05-18 RX ADMIN — OMEPRAZOLE 20 MG: 20 CAPSULE, DELAYED RELEASE ORAL at 20:20

## 2018-05-18 RX ADMIN — CALCIUM ACETATE 667 MG: 667 CAPSULE ORAL at 17:56

## 2018-05-18 RX ADMIN — INSULIN ASPART 3 UNITS: 100 INJECTION, SOLUTION INTRAVENOUS; SUBCUTANEOUS at 20:22

## 2018-05-18 RX ADMIN — FLUTICASONE PROPIONATE 2 SPRAY: 50 SPRAY, METERED NASAL at 20:20

## 2018-05-18 RX ADMIN — HYDRALAZINE HYDROCHLORIDE 25 MG: 25 TABLET ORAL at 14:33

## 2018-05-18 RX ADMIN — INSULIN ASPART 2 UNITS: 100 INJECTION, SOLUTION INTRAVENOUS; SUBCUTANEOUS at 14:35

## 2018-05-18 RX ADMIN — ATORVASTATIN CALCIUM 40 MG: 40 TABLET, FILM COATED ORAL at 20:20

## 2018-05-18 RX ADMIN — Medication 1 CAPSULE: at 07:42

## 2018-05-18 RX ADMIN — POLYETHYLENE GLYCOL 3350 17 G: 17 POWDER, FOR SOLUTION ORAL at 07:42

## 2018-05-18 RX ADMIN — CALCIUM ACETATE 667 MG: 667 CAPSULE ORAL at 14:33

## 2018-05-18 RX ADMIN — SENNOSIDES AND DOCUSATE SODIUM 1 TABLET: 8.6; 5 TABLET ORAL at 07:42

## 2018-05-18 RX ADMIN — ALBUTEROL SULFATE 2.5 MG: 2.5 SOLUTION RESPIRATORY (INHALATION) at 21:19

## 2018-05-18 RX ADMIN — ASPIRIN 81 MG CHEWABLE TABLET 81 MG: 81 TABLET CHEWABLE at 20:20

## 2018-05-18 RX ADMIN — VITAMIN B12 0.1 MG ORAL TABLET 100 MCG: 0.1 TABLET ORAL at 07:42

## 2018-05-18 RX ADMIN — CALCIUM ACETATE 667 MG: 667 CAPSULE ORAL at 07:43

## 2018-05-18 RX ADMIN — HYDRALAZINE HYDROCHLORIDE 25 MG: 25 TABLET ORAL at 09:16

## 2018-05-18 NOTE — PROGRESS NOTES
"  Nephrology Progress Note  05/18/2018         Assessment & Recommendations:   Murray Nicholson is a 63 yr old male with PMH of HTN, DMII, functionally bicuspid aortic valve, CHF/CM (EF 10-15%), ESRD, admitted with worsening dyspnea/SOB, now on dobutamine drip and being evaluated for heart/kidney transplant.      ESKD: due to DMII, on PD since Aug 2017, changed to iHD 1/2018 due to polymicrobial and fungal peritonitis, now on TTS schedule at [facility] under care of Dr Mcpherson. Access: tunneled RIJ (replaced 4/17/18). Run time: 4 hrs; EDW 77 kg.  - Dialysis per TTS schedule   - Using low dose heparin on HD  - Heparin lock CVC        Volume: weight 77.7 yesterday, RHC 5/1 (done at 77 kg): RA 3, PCW 15; CVP 7   - ICU does not have a scale to do standing weights     Severe AV and MR insufficiency:   BP/congestive CM (EF 10-15%); minimal CAD per angio on 2/8/17. Chronically hypotensive with tachycardia  - Goal MAP > 65 and SBP > 95 while dialyzing  - On dobutamine 2.5 mcg/kg/min (started 4/20/18)  - Listed for heart/kidney tx, in ICU on dobutamine and dopamine         Anemia: hgb 9.0, labs 5/8: ferritin 621, IS 27, iron 58; on maintenance venofer 50 mg qTuesday   - Continue venofer qTues  - On epogen 4000 units per HD      BMD: calcium 8.6, alb 3.5, phos 3.6; recent ; Vit D 27; on hectorol 0.5 mcg via HD; on phoslo 1 tab TID with meals.   - Continue hectorol via HD  - Continue Phoslo      Recommendations were communicated to primary team via this note.       Shannan Nazario MD  Division of Kidney Disease  [phone]    Interval History :   Doing well with no interval events. Remains in ICU in DB drip.  Denies CP, SOB, chills, n/v.    Review of Systems:   4 point ROS negative other than as noted above.    Physical Exam:   I/O last 3 completed shifts:  In: 1132.4 [P.O.:840; I.V.:292.4]  Out: 2500 [Other:2500]   /84  Pulse 107  Temp 98.2  F (36.8  C) (Oral)  Resp 16  Ht 1.727 m (5' 8\")  Wt 77.7 kg (171 " lb 4.8 oz)  SpO2 97%  BMI 26.05 kg/m2     GENERAL APPEARANCE: alert, NAD  EYES: no scleral icterus, pupils equal   Pulmonary: lungs clear to auscultation with equal breath sounds bilaterally; has swan  CV: regular rhythm, tachycardia at baseline   - Edema trace  GI: soft, nontender, normal bowel sounds  MS: no evidence of inflammation in joints, no muscle tenderness  : no doyle  SKIN: no rash, warm, dry, no cyanosis  NEURO: face symmetric, no asterixis   Access: tunneled RIJ       Labs:   All labs reviewed by me  Electrolytes/Renal -   Recent Labs   Lab Test  05/17/18   0602  05/15/18   0402  05/12/18   0656   05/01/18   0930   04/21/18   0640   NA  134  135  137   < >  133   < >  135   POTASSIUM  4.0  4.6  4.6   < >  5.0   < >  4.0   CHLORIDE  99  98  98   < >  94   < >  96   CO2  28  31  30   < >  31   < >  24   BUN  26  34*  28   < >  36*   < >  41*   CR  6.56*  8.18*  7.07*   < >  8.51*   < >  7.72*   GLC  85  88  125*   < >  100*   < >  111*   TRINI  8.6  9.8  9.6   < >  9.7   < >  9.2   MAG  1.6  2.0  2.4*   < >  2.4*   < >  1.6   PHOS   --   3.6   --    --   3.2   --   5.4*    < > = values in this interval not displayed.       CBC -   Recent Labs   Lab Test  05/17/18   0602  05/15/18   0402  05/14/18   0853   WBC  5.1  5.6  6.2   HGB  9.0*  8.9*  9.4*   PLT  195  244  249       LFTs -   Recent Labs   Lab Test  04/16/18   1546  03/07/18   0833  02/19/18   1558  02/02/18   1736   ALKPHOS  123  129  102  86   BILITOTAL  0.8  0.7  0.6  0.4   ALT  22  21  15  16   AST  17  18  18  20   PROTTOTAL  7.4   --   7.2  6.2*   ALBUMIN  3.5   --   2.7*  2.1*       Iron Panel -   Recent Labs   Lab Test  05/08/18   0954  07/19/17   1306  07/05/17   1204   IRON  58  46  26*   IRONSAT  27  18  12*   ALBERTINA  621*  369  542*         Imaging:  Reviewed    Current Medications:    albuterol  2.5 mg Nebulization At Bedtime     allopurinol  100 mg Oral Daily     aspirin  81 mg Oral Daily     atorvastatin  40 mg Oral Daily     calcium  acetate  667 mg Oral TID w/meals     cetirizine  10 mg Oral Daily     cyanocolbalamin  100 mcg Oral Daily     fluticasone  1-2 spray Both Nostrils Daily     hydrALAZINE  25 mg Oral 4 times per day on Sun Mon Wed Fri    And     [START ON 5/19/2018] hydrALAZINE  10 mg Oral 2 times per day on Tue Thu Sat     insulin aspart  1-10 Units Subcutaneous TID AC     insulin aspart  1-7 Units Subcutaneous At Bedtime     [START ON 5/19/2018] insulin aspart   Subcutaneous QAM AC     insulin aspart   Subcutaneous Daily with lunch     insulin aspart   Subcutaneous Daily with supper     [START ON 5/19/2018] insulin glargine  10 Units Subcutaneous QAM AC     NEPHROCAPS  1 capsule Oral Daily     omeprazole  20 mg Oral Daily     polyethylene glycol  17 g Oral Daily     senna-docusate  1 tablet Oral BID     sodium chloride (PF)  10 mL Intracatheter Q7 Days     sodium chloride (PF)  3 mL Intracatheter Q8H     [START ON 5/19/2018] vitamin  B-1  100 mg Oral Daily       DOBUTamine 2.5 mcg/kg/min (05/18/18 1300)     DOPamine 2.5 mcg/kg/min (05/18/18 1300)     - MEDICATION INSTRUCTIONS -       Reason ACE/ARB/ARNI order not selected       Reason beta blocker order not selected       Shannan Nazario MD

## 2018-05-18 NOTE — PROGRESS NOTES
"  Advanced Heart Failure and Transplant Cardiology  Progress Note:    Assessment and Plan:  63 year old male with a PMH of CAD, ICM (EF of 15-20%), ESRD, bicuspid aortic valve with AI, severe MR, and proximal AAA who was admitted for decompensated heart failure. He is on dobutamine 2.5 mcg/kg/min and listed for heart/kidney transplant as status 1A.      Today's Plan:   - increase hydralazine    # Stage D NYHA Class III systolic heart failure secondary to ICM, listed 1A  # Severe mitral regurgitation  # Severe holodiastolic aortic insufficiency   - continue dobutamine 2.5 mcg/kg/min, dopamine 2.5 mcg/kg/min  Sleep apnea: yes, unable to tolerate CPAP  - review at meeting this week  - hydralazine 25 mg q6 hours on non-dialysis days + 10 mg BID on dialysis days (after HD)  - weekly CXR (next 5/21)       # Chronic Medical Issues:  - Seborrheic keratoses / Dermatofibromas / Multiple benign nevi / Nummular dermatitis: derm consulted, all lesions benign  - Hx of allergic rhinitis: cetrizine  - ESRD: Dialysis T, Th, Sa   - Non-obstructive CAD, Anomalous RCA: ASA 81 mg, atorvastatin 40 mg daily  - Ascending aortic aneurysm: 4.5 x 4.5 cm proximal ascending aortic aneurysm seen on MRI 2/14  - Multiple benign branch duct-IPMNs: no concerning features affecting transplant candidacy      FEN: regular diet, 2L FR  PROPHY: ambulation  LINES: PICC  DISPO: TBD  CODE STATUS:  Full  ================================================================    Subjective/24-Hr Events:   - no acute issues overnight    ROS:  4 point ROS including respiratory, CV, GI and  (other than that noted in the HPI) is negative.     Medications: Reviewed in EPIC.     Physical Exam:   /84  Pulse 107  Temp 98.2  F (36.8  C) (Oral)  Resp 16  Ht 1.727 m (5' 8\")  Wt 77.7 kg (171 lb 4.8 oz)  SpO2 97%  BMI 26.05 kg/m2  GENERAL: A&O x 3, NAD  NECK: no JVD, L-sided [place] in place bandaged and c/d/i  CV: Mildly tachycardic, +S1S2, soft murmur across " precordium  RESPIRATORY: CTAB  GI: soft, NT, ND  EXTREMITIES: No peripheral edema. 2+ bilateral pedal pulses. Warm.   NEUROLOGIC: Alert and oriented x 3. No focal deficits.   SKIN: No jaundice. No rashes or lesions.     Labs / Imaging:  - all labs and imaging reviewed

## 2018-05-18 NOTE — PLAN OF CARE
Problem: Patient Care Overview  Goal: Individualization & Mutuality    Pt continues status 1A for heart/kidney. Independent w/ cares and in good spirits. Undisturbed overnight per order. Continues on dopamine 2.5 and dobutamine 2.5. See flowsheets for VS/hemodynamics. Anuric. AM glucose running lower despite reduction in evening lantus. Continue to monitor, update team w/ changes.

## 2018-05-18 NOTE — PLAN OF CARE
Problem: Patient Care Overview  Goal: Plan of Care/Patient Progress Review  Outcome: Therapy, progress toward functional goals as expected  Pt continues on dobutamine and dopamine drips. Independent in room. Ficks completed q12H. Endocrinology consulted.

## 2018-05-18 NOTE — PLAN OF CARE
Problem: Patient Care Overview  Goal: Plan of Care/Patient Progress Review  Discharge Planner OT   Patient plan for discharge: pending medical status  Current status: Pt completed in room and in núñez ambulation to increase ind in ADLS/IADLS. Pt ambulating over 1,500 ft total w/o rest breaks. Pt's VSS w/  noted at the highest, in 100s/110s throughout. Pt denied symptoms throughout.   Barriers to return to prior living situation: medical status  Recommendations for discharge: pending medical status  Rationale for recommendations: per clinical scope       Entered by: Vangie Sandoval 05/18/2018 3:55 PM

## 2018-05-18 NOTE — PROGRESS NOTES
CLINICAL NUTRITION SERVICES - REASSESSMENT NOTE     Nutrition Prescription    RECOMMENDATIONS FOR MDs/PROVIDERS TO ORDER:  Continue liberalized diet as ordered    Malnutrition Status:    Non-severe malnutrition in the context of chronic illness    Recommendations already ordered by Registered Dietitian (RD):  1. Nepro oral supplement @ 2 pm daily    2. Thiamine 100 mg daily per previous recs    Future/Additional Recommendations:  If needs nutrition support (i.e. post-op if receives transplant), recommend either Nepro (if needs lower K+/phos formula) with eventual goal of 60 ml/hr (1440 ml/day) to provide 2592 kcals (35 kcal/kg/day), 117 g PRO (1.6 g/kg/day), 1051 ml free H2O, 232 g CHO and 18 g Fiber daily vs TwoCal HN (if wish standard K+/phos content) @ 55 mL/hr (1320 mL/day) to provide 2640 kcals (35 kcal/kg/day), 111 g PRO (1.5 g/kg/day), 924 mL H2O, 289 g CHO and 7 g Fiber daily.     EVALUATION OF THE PROGRESS TOWARD GOALS   Diet: Regular, 2000 mL fluid restriction, PRN supplements  Intake: Meal intake most often documented as 100%, at times 25-75%. Good diet tolerance per flowsheet. Pt states he has been eating OK and has found some things on the menu he can tolerate. He is eating mainly high-CHO foods (bagels, cereal, fruit) and not as much protein foods (thinks some of the meats are dry, eggs are cold). He does try to get peanut butter, fish, turkey from time to time.      NEW FINDINGS   -Renal: remains on HD, K+ and phos controlled    MALNUTRITION  % Intake: Decreased intake does not meet criteria  % Weight Loss: None noted, current wt 77.7 kg remains >lowest admit wt 74.7 kg on 4/21  Subcutaneous Fat Loss: Facial region and Lower arm: mild  Muscle Loss: Temporal, Thoracic region (clavicle, acromium bone, deltoid, trapezius, pectoral), Upper arm (bicep, tricep) and Lower arm  (forearm): moderate  Fluid Accumulation/Edema: None noted  Malnutrition Diagnosis: Non-severe malnutrition in the context of chronic  illness    Previous Goals   Patient to consume % of nutritionally adequate meal trays TID, or the equivalent with supplements/snacks.  Evaluation: Met % most of the time, but likely not adequate amounts of protein for increased dialysis needs    Previous Nutrition Diagnosis  Inadequate oral intake related to NPO status at times for tests/procedures), early satiety, LOS, and food preferences in the hospital as evidenced by pt consuming 50% of meals at times.    Evaluation: Improving    CURRENT NUTRITION DIAGNOSIS  Inadequate protein intake related to increased needs on HD, dislike of protein foods on the menu as evidenced by eating mainly CHO-containing foods      INTERVENTIONS  Implementation  Education - Encouraged protein foods at each meal, adding protein/kcal supplement daily. Discussed other options besides Nepro for ONS - such as Gelatein or Boost Breeze.   Medical food supplement therapy - Nepro once daily per pt request - will not count in fluid restriction in [facility], but RN to count on floor.     Goals  Pt to consume at least 1 protein source at TID meals + 1 protein supplement daily.    Monitoring/Evaluation  Progress toward goals will be monitored and evaluated per protocol.    Zunilda Deleon RD, LD, [facility]  CVICU Dietitian  Pager: [phone]

## 2018-05-19 LAB
ABO + RH BLD: NORMAL
ABO + RH BLD: NORMAL
ANION GAP SERPL CALCULATED.3IONS-SCNC: 8 MMOL/L (ref 3–14)
BASE EXCESS BLDV CALC-SCNC: 4.8 MMOL/L
BLD GP AB SCN SERPL QL: NORMAL
BLOOD BANK CMNT PATIENT-IMP: NORMAL
BUN SERPL-MCNC: 28 MG/DL (ref 7–30)
CALCIUM SERPL-MCNC: 9.2 MG/DL (ref 8.5–10.1)
CHLORIDE SERPL-SCNC: 108 MMOL/L (ref 94–109)
CO2 SERPL-SCNC: 24 MMOL/L (ref 20–32)
CREAT SERPL-MCNC: 6.17 MG/DL (ref 0.66–1.25)
GFR SERPL CREATININE-BSD FRML MDRD: 9 ML/MIN/1.7M2
GLUCOSE BLDC GLUCOMTR-MCNC: 132 MG/DL (ref 70–99)
GLUCOSE BLDC GLUCOMTR-MCNC: 133 MG/DL (ref 70–99)
GLUCOSE BLDC GLUCOMTR-MCNC: 143 MG/DL (ref 70–99)
GLUCOSE BLDC GLUCOMTR-MCNC: 171 MG/DL (ref 70–99)
GLUCOSE BLDC GLUCOMTR-MCNC: 212 MG/DL (ref 70–99)
GLUCOSE SERPL-MCNC: 118 MG/DL (ref 70–99)
HCO3 BLDV-SCNC: 29 MMOL/L (ref 21–28)
MAGNESIUM SERPL-MCNC: 2 MG/DL (ref 1.6–2.3)
O2/TOTAL GAS SETTING VFR VENT: ABNORMAL %
OXYHGB MFR BLDV: 54 %
PCO2 BLDV: 38 MM HG (ref 40–50)
PH BLDV: 7.48 PH (ref 7.32–7.43)
PO2 BLDV: 29 MM HG (ref 25–47)
POTASSIUM SERPL-SCNC: 4.4 MMOL/L (ref 3.4–5.3)
SODIUM SERPL-SCNC: 140 MMOL/L (ref 133–144)
SPECIMEN EXP DATE BLD: NORMAL

## 2018-05-19 PROCEDURE — A9270 NON-COVERED ITEM OR SERVICE: HCPCS | Mod: GY | Performed by: STUDENT IN AN ORGANIZED HEALTH CARE EDUCATION/TRAINING PROGRAM

## 2018-05-19 PROCEDURE — 40000275 ZZH STATISTIC RCP TIME EA 10 MIN

## 2018-05-19 PROCEDURE — 94640 AIRWAY INHALATION TREATMENT: CPT

## 2018-05-19 PROCEDURE — 25000125 ZZHC RX 250: Performed by: STUDENT IN AN ORGANIZED HEALTH CARE EDUCATION/TRAINING PROGRAM

## 2018-05-19 PROCEDURE — 90937 HEMODIALYSIS REPEATED EVAL: CPT

## 2018-05-19 PROCEDURE — 86850 RBC ANTIBODY SCREEN: CPT | Performed by: INTERNAL MEDICINE

## 2018-05-19 PROCEDURE — 86901 BLOOD TYPING SEROLOGIC RH(D): CPT | Performed by: INTERNAL MEDICINE

## 2018-05-19 PROCEDURE — 25000128 H RX IP 250 OP 636: Performed by: STUDENT IN AN ORGANIZED HEALTH CARE EDUCATION/TRAINING PROGRAM

## 2018-05-19 PROCEDURE — 82805 BLOOD GASES W/O2 SATURATION: CPT | Performed by: INTERNAL MEDICINE

## 2018-05-19 PROCEDURE — 83735 ASSAY OF MAGNESIUM: CPT | Performed by: NURSE PRACTITIONER

## 2018-05-19 PROCEDURE — 25000132 ZZH RX MED GY IP 250 OP 250 PS 637: Mod: GY | Performed by: INTERNAL MEDICINE

## 2018-05-19 PROCEDURE — A9270 NON-COVERED ITEM OR SERVICE: HCPCS | Mod: GY | Performed by: NURSE PRACTITIONER

## 2018-05-19 PROCEDURE — 25000132 ZZH RX MED GY IP 250 OP 250 PS 637: Mod: GY | Performed by: STUDENT IN AN ORGANIZED HEALTH CARE EDUCATION/TRAINING PROGRAM

## 2018-05-19 PROCEDURE — 25000132 ZZH RX MED GY IP 250 OP 250 PS 637: Mod: GY | Performed by: NURSE PRACTITIONER

## 2018-05-19 PROCEDURE — 99232 SBSQ HOSP IP/OBS MODERATE 35: CPT | Mod: GC | Performed by: INTERNAL MEDICINE

## 2018-05-19 PROCEDURE — 80048 BASIC METABOLIC PNL TOTAL CA: CPT | Performed by: NURSE PRACTITIONER

## 2018-05-19 PROCEDURE — 00000146 ZZHCL STATISTIC GLUCOSE BY METER IP

## 2018-05-19 PROCEDURE — 20000004 ZZH R&B ICU UMMC

## 2018-05-19 PROCEDURE — 86900 BLOOD TYPING SEROLOGIC ABO: CPT | Performed by: INTERNAL MEDICINE

## 2018-05-19 PROCEDURE — 25000132 ZZH RX MED GY IP 250 OP 250 PS 637: Performed by: NURSE PRACTITIONER

## 2018-05-19 PROCEDURE — 63400005 ZZH RX 634: Performed by: INTERNAL MEDICINE

## 2018-05-19 PROCEDURE — A9270 NON-COVERED ITEM OR SERVICE: HCPCS | Mod: GY | Performed by: INTERNAL MEDICINE

## 2018-05-19 PROCEDURE — 25000128 H RX IP 250 OP 636: Performed by: INTERNAL MEDICINE

## 2018-05-19 RX ORDER — HEPARIN SODIUM 1000 [USP'U]/ML
500 INJECTION, SOLUTION INTRAVENOUS; SUBCUTANEOUS
Status: COMPLETED | OUTPATIENT
Start: 2018-05-19 | End: 2018-05-19

## 2018-05-19 RX ORDER — HEPARIN SODIUM 1000 [USP'U]/ML
500 INJECTION, SOLUTION INTRAVENOUS; SUBCUTANEOUS CONTINUOUS
Status: DISCONTINUED | OUTPATIENT
Start: 2018-05-19 | End: 2018-05-19

## 2018-05-19 RX ORDER — DOXERCALCIFEROL 4 UG/2ML
0.5 INJECTION INTRAVENOUS
Status: COMPLETED | OUTPATIENT
Start: 2018-05-19 | End: 2018-05-19

## 2018-05-19 RX ADMIN — INSULIN ASPART 2 UNITS: 100 INJECTION, SOLUTION INTRAVENOUS; SUBCUTANEOUS at 19:45

## 2018-05-19 RX ADMIN — ALLOPURINOL 100 MG: 100 TABLET ORAL at 08:29

## 2018-05-19 RX ADMIN — CALCIUM ACETATE 667 MG: 667 CAPSULE ORAL at 08:29

## 2018-05-19 RX ADMIN — HEPARIN SODIUM 500 UNITS/HR: 1000 INJECTION, SOLUTION INTRAVENOUS; SUBCUTANEOUS at 17:57

## 2018-05-19 RX ADMIN — INSULIN ASPART 1 UNITS: 100 INJECTION, SOLUTION INTRAVENOUS; SUBCUTANEOUS at 10:11

## 2018-05-19 RX ADMIN — CALCIUM ACETATE 667 MG: 667 CAPSULE ORAL at 19:36

## 2018-05-19 RX ADMIN — EPOETIN ALFA 4000 UNITS: 10000 SOLUTION INTRAVENOUS; SUBCUTANEOUS at 18:00

## 2018-05-19 RX ADMIN — Medication 1 CAPSULE: at 08:29

## 2018-05-19 RX ADMIN — HYDRALAZINE HYDROCHLORIDE 10 MG: 10 TABLET, FILM COATED ORAL at 19:36

## 2018-05-19 RX ADMIN — FLUTICASONE PROPIONATE 1 SPRAY: 50 SPRAY, METERED NASAL at 19:46

## 2018-05-19 RX ADMIN — SENNOSIDES AND DOCUSATE SODIUM 1 TABLET: 8.6; 5 TABLET ORAL at 19:36

## 2018-05-19 RX ADMIN — SODIUM CHLORIDE 250 ML: 9 INJECTION, SOLUTION INTRAVENOUS at 17:59

## 2018-05-19 RX ADMIN — DOXERCALCIFEROL 0.5 MCG: 4 INJECTION, SOLUTION INTRAVENOUS at 18:07

## 2018-05-19 RX ADMIN — SENNOSIDES AND DOCUSATE SODIUM 1 TABLET: 8.6; 5 TABLET ORAL at 08:28

## 2018-05-19 RX ADMIN — INSULIN GLARGINE 10 UNITS: 100 INJECTION, SOLUTION SUBCUTANEOUS at 08:39

## 2018-05-19 RX ADMIN — VITAMIN B12 0.1 MG ORAL TABLET 100 MCG: 0.1 TABLET ORAL at 08:29

## 2018-05-19 RX ADMIN — DOPAMINE HYDROCHLORIDE IN DEXTROSE 2.5 MCG/KG/MIN: 1.6 INJECTION, SOLUTION INTRAVENOUS at 12:10

## 2018-05-19 RX ADMIN — ALBUTEROL SULFATE 2.5 MG: 2.5 SOLUTION RESPIRATORY (INHALATION) at 22:02

## 2018-05-19 RX ADMIN — CALCIUM ACETATE 667 MG: 667 CAPSULE ORAL at 12:10

## 2018-05-19 RX ADMIN — CETIRIZINE HYDROCHLORIDE 10 MG: 10 TABLET, FILM COATED ORAL at 08:29

## 2018-05-19 RX ADMIN — ATORVASTATIN CALCIUM 40 MG: 40 TABLET, FILM COATED ORAL at 19:36

## 2018-05-19 RX ADMIN — HYDRALAZINE HYDROCHLORIDE 10 MG: 10 TABLET, FILM COATED ORAL at 16:10

## 2018-05-19 RX ADMIN — HEPARIN SODIUM 500 UNITS: 1000 INJECTION, SOLUTION INTRAVENOUS; SUBCUTANEOUS at 17:57

## 2018-05-19 RX ADMIN — Medication 100 MG: at 08:29

## 2018-05-19 RX ADMIN — INSULIN ASPART 5 UNITS: 100 INJECTION, SOLUTION INTRAVENOUS; SUBCUTANEOUS at 14:28

## 2018-05-19 RX ADMIN — ASPIRIN 81 MG CHEWABLE TABLET 81 MG: 81 TABLET CHEWABLE at 19:36

## 2018-05-19 RX ADMIN — SODIUM CHLORIDE 300 ML: 9 INJECTION, SOLUTION INTRAVENOUS at 17:58

## 2018-05-19 RX ADMIN — OMEPRAZOLE 20 MG: 20 CAPSULE, DELAYED RELEASE ORAL at 19:36

## 2018-05-19 NOTE — PROGRESS NOTES
"  Advanced Heart Failure and Transplant Cardiology  Progress Note:    Assessment and Plan:  63 year old male with a PMH of CAD, ICM (EF of 15-20%), ESRD, bicuspid aortic valve with AI, severe MR, and proximal AAA who was admitted for decompensated heart failure. He is on dobutamine 2.5 mcg/kg/min and listed for heart/kidney transplant as status 1A.      Today's Plan:   - dialysis    # Stage D NYHA Class III systolic heart failure secondary to ICM, listed 1A  # Severe mitral regurgitation  # Severe holodiastolic aortic insufficiency   - continue dobutamine 2.5 mcg/kg/min, dopamine 2.5 mcg/kg/min  Sleep apnea: yes, unable to tolerate CPAP  - review at meeting this week  - hydralazine 25 mg q6 hours on non-dialysis days + 10 mg BID on dialysis days (after HD)  - weekly CXR (next 5/21)       # Chronic Medical Issues:  - Seborrheic keratoses / Dermatofibromas / Multiple benign nevi / Nummular dermatitis: derm consulted, all lesions benign  - Hx of allergic rhinitis: cetrizine  - ESRD: Dialysis T, Th, Sa   - Non-obstructive CAD, Anomalous RCA: ASA 81 mg, atorvastatin 40 mg daily  - Ascending aortic aneurysm: 4.5 x 4.5 cm proximal ascending aortic aneurysm seen on MRI 2/14  - Multiple benign branch duct-IPMNs: no concerning features affecting transplant candidacy      FEN: regular diet, 2L FR  PROPHY: ambulation  LINES: PICC  DISPO: TBD  CODE STATUS:  Full    Discussed with dr. ranjana Stephenson MD PGY4  Cardiology Fellow  [phone]    ================================================================    Subjective/24-Hr Events:   - no acute issues overnight.  Feels good without complaints.    ROS:  4 point ROS including respiratory, CV, GI and  (other than that noted in the HPI) is negative.     Medications: Reviewed in EPIC.     Physical Exam:   /74 (BP Location: Right arm)  Pulse 107  Temp 98.6  F (37  C) (Oral)  Resp 18  Ht 1.727 m (5' 8\")  Wt 77.7 kg (171 lb 4.8 oz)  SpO2 98%  BMI 26.05 " kg/m2    GENERAL: A&O x 3, NAD  NECK: no JVD, L-sided [place] in place bandaged and c/d/i  CV: Mildly tachycardic, +S1S2, soft murmur across precordium  RESPIRATORY: CTAB  GI: soft, NT, ND  EXTREMITIES: No peripheral edema. 2+ bilateral pedal pulses. Warm.   NEUROLOGIC: Alert and oriented x 3. No focal deficits.   SKIN: No jaundice. No rashes or lesions.     Labs / Imaging:  - all labs and imaging reviewed

## 2018-05-19 NOTE — PROGRESS NOTES
Nephrology Progress Note  05/19/2018         Assessment & Recommendations:   Murray Nicholson is a 63 yr old male with PMH of HTN, DMII, functionally bicuspid aortic valve, CHF/CM (EF 10-15%), ESRD, admitted with worsening dyspnea/SOB, now on dobutamine drip and being evaluated for heart/kidney transplant.      ESKD: due to DMII, on PD since Aug 2017, changed to iHD 1/2018 due to polymicrobial and fungal peritonitis, now on TTS schedule at [facility] under care of Dr Mcpherson. Access: tunneled RIJ (replaced 4/17/18). Run time: 4 hrs; EDW 77 kg.  - Dialysis per TTS schedule   - Using low dose heparin on HD  - Heparin lock CVC        Volume: weight 80.1 this morning, RHC 5/1 (done at 77 kg): RA 3, PCW 15; CVP 7   - ICU does not have a scale to do standing weights     Severe AV and MR insufficiency:   BP/congestive CM (EF 10-15%); minimal CAD per angio on 2/8/17. Chronically hypotensive with tachycardia  - Goal MAP > 65 and SBP > 95 while dialyzing  - On dobutamine 2.5 mcg/kg/min (started 4/20/18)  - Listed for heart/kidney tx, in ICU on dobutamine and dopamine         Anemia: hgb 9.0, labs 5/8: ferritin 621, IS 27, iron 58; on maintenance venofer 50 mg qTuesday   - Continue venofer qTues  - On epogen 4000 units per HD      BMD: calcium 9.8, alb 3.5, phos 3.6; recent ; Vit D 27; on hectorol 0.5 mcg via HD; on phoslo 1 tab TID with meals.   - Continue hectorol via HD  - Continue Phoslo      Recommendations were communicated to primary team via this note.       Cleo Yu NP  Division of Kidney Disease  [phone]    Interval History :   Doing well with no interval events. Remains in ICU in DB drip.  Denies CP, SOB, chills, n/v. Planning HD this afternoon.     Review of Systems:   4 point ROS negative other than as noted above.    Physical Exam:   I/O last 3 completed shifts:  In: 1322.4 [P.O.:1080; I.V.:242.4]  Out: -    /74 (BP Location: Right arm)  Pulse 107  Temp 98.6  F (37  C) (Oral)  Resp  "18  Ht 1.727 m (5' 8\")  Wt 80.1 kg (176 lb 9.4 oz)  SpO2 98%  BMI 26.85 kg/m2     GENERAL APPEARANCE: alert, NAD  EYES: no scleral icterus, pupils equal   Pulmonary: lungs clear to auscultation with equal breath sounds bilaterally; has swan  CV: regular rhythm, tachycardia at baseline   - Edema trace  GI: soft, nontender, normal bowel sounds  MS: no evidence of inflammation in joints, no muscle tenderness  : no doyle  SKIN: no rash, warm, dry, no cyanosis  NEURO: face symmetric, no asterixis   Access: tunneled [facility]       Labs:   All labs reviewed by me  Electrolytes/Renal -   Recent Labs   Lab Test  05/19/18   0557  05/17/18   0602  05/15/18   0402   05/01/18   0930   04/21/18   0640   NA  140  134  135   < >  133   < >  135   POTASSIUM  4.4  4.0  4.6   < >  5.0   < >  4.0   CHLORIDE  108  99  98   < >  94   < >  96   CO2  24  28  31   < >  31   < >  24   BUN  28  26  34*   < >  36*   < >  41*   CR  6.17*  6.56*  8.18*   < >  8.51*   < >  7.72*   GLC  118*  85  88   < >  100*   < >  111*   TRINI  9.2  8.6  9.8   < >  9.7   < >  9.2   MAG  2.0  1.6  2.0   < >  2.4*   < >  1.6   PHOS   --    --   3.6   --   3.2   --   5.4*    < > = values in this interval not displayed.       CBC -   Recent Labs   Lab Test  05/17/18   0602  05/15/18   0402  05/14/18   0853   WBC  5.1  5.6  6.2   HGB  9.0*  8.9*  9.4*   PLT  195  244  249       LFTs -   Recent Labs   Lab Test  04/16/18   1546  03/07/18   0833  02/19/18   1558  02/02/18   1736   ALKPHOS  123  129  102  86   BILITOTAL  0.8  0.7  0.6  0.4   ALT  22  21  15  16   AST  17  18  18  20   PROTTOTAL  7.4   --   7.2  6.2*   ALBUMIN  3.5   --   2.7*  2.1*       Iron Panel -   Recent Labs   Lab Test  05/08/18   0954  07/19/17   1306  07/05/17   1204   IRON  58  46  26*   IRONSAT  27  18  12*   ALBERTINA  621*  369  542*         Imaging:  Reviewed    Current Medications:    sodium chloride 0.9%  250 mL Intravenous Once in dialysis     sodium chloride 0.9%  300 mL Hemodialysis Machine " Once     albuterol  2.5 mg Nebulization At Bedtime     allopurinol  100 mg Oral Daily     aspirin  81 mg Oral Daily     atorvastatin  40 mg Oral Daily     calcium acetate  667 mg Oral TID w/meals     cetirizine  10 mg Oral Daily     cyanocolbalamin  100 mcg Oral Daily     doxercalciferol  0.5 mcg Intravenous Once in dialysis     epoetin emily (EPOGEN,PROCRIT) inj ESRD  4,000 Units Intravenous Once in dialysis     fluticasone  1-2 spray Both Nostrils Daily     gelatin absorbable  1 each Topical During Hemodialysis (from stock)     heparin (porcine)  500 Units Hemodialysis Machine Once in dialysis     heparin  3 mL Intracatheter During Hemodialysis (from stock)     heparin  3 mL Intracatheter During Hemodialysis (from stock)     hydrALAZINE  25 mg Oral 4 times per day on Sun Mon Wed Fri    And     hydrALAZINE  10 mg Oral 2 times per day on Tue Thu Sat     insulin aspart  1-10 Units Subcutaneous TID AC     insulin aspart  1-7 Units Subcutaneous At Bedtime     insulin aspart   Subcutaneous QAM AC     insulin aspart   Subcutaneous Daily with lunch     insulin aspart   Subcutaneous Daily with supper     insulin glargine  10 Units Subcutaneous QAM AC     NEPHROCAPS  1 capsule Oral Daily     omeprazole  20 mg Oral Daily     polyethylene glycol  17 g Oral Daily     senna-docusate  1 tablet Oral BID     sodium chloride (PF)  10 mL Intracatheter Q7 Days     sodium chloride (PF)  3 mL Intracatheter Q8H     sodium chloride (PF)  3 mL Intracatheter During Hemodialysis (from stock)     sodium chloride (PF)  3 mL Intracatheter During Hemodialysis (from stock)     vitamin  B-1  100 mg Oral Daily       DOBUTamine 2.5 mcg/kg/min (05/19/18 0700)     DOPamine 2.5 mcg/kg/min (05/19/18 0700)     heparin (porcine)       - MEDICATION INSTRUCTIONS -       Reason ACE/ARB/ARNI order not selected       Reason beta blocker order not selected       Cleo Yu, LEWIS

## 2018-05-19 NOTE — PLAN OF CARE
Problem: Patient Care Overview  Goal: Individualization & Mutuality  Outcome: No Change  Hemodynamically stable throughout shift supported with Dobutamine 2.5 mcg/kg/min & Dopamine 2.5 mcg/kg/min. Hemodialysis run completed in early evening hours. Please see flow sheet for details regarding hemodynamics and I/Os. Patient ambulated in núñez with nurse and up in chair most of shift.

## 2018-05-19 NOTE — PLAN OF CARE
Problem: Patient Care Overview  Goal: Individualization & Mutuality    Pt continues status 1A for heart/kidney. Independent w/ cares and in good spirits. Continues on dopamine 2.5 and dobutamine 2.5. See flowsheets for VS/hemodynamics. Plan for HD today. Continue to monitor, update team w/ changes.

## 2018-05-20 LAB
BASE EXCESS BLDV CALC-SCNC: 6.8 MMOL/L
GLUCOSE BLDC GLUCOMTR-MCNC: 135 MG/DL (ref 70–99)
GLUCOSE BLDC GLUCOMTR-MCNC: 147 MG/DL (ref 70–99)
GLUCOSE BLDC GLUCOMTR-MCNC: 153 MG/DL (ref 70–99)
GLUCOSE BLDC GLUCOMTR-MCNC: 177 MG/DL (ref 70–99)
HCO3 BLDV-SCNC: 31 MMOL/L (ref 21–28)
O2/TOTAL GAS SETTING VFR VENT: 21 %
OXYHGB MFR BLDV: 58 %
PCO2 BLDV: 42 MM HG (ref 40–50)
PH BLDV: 7.48 PH (ref 7.32–7.43)
PO2 BLDV: 33 MM HG (ref 25–47)

## 2018-05-20 PROCEDURE — 25000128 H RX IP 250 OP 636: Performed by: INTERNAL MEDICINE

## 2018-05-20 PROCEDURE — A9270 NON-COVERED ITEM OR SERVICE: HCPCS | Mod: GY | Performed by: STUDENT IN AN ORGANIZED HEALTH CARE EDUCATION/TRAINING PROGRAM

## 2018-05-20 PROCEDURE — A9270 NON-COVERED ITEM OR SERVICE: HCPCS | Mod: GY | Performed by: INTERNAL MEDICINE

## 2018-05-20 PROCEDURE — 99232 SBSQ HOSP IP/OBS MODERATE 35: CPT | Mod: GC | Performed by: INTERNAL MEDICINE

## 2018-05-20 PROCEDURE — 82805 BLOOD GASES W/O2 SATURATION: CPT | Performed by: INTERNAL MEDICINE

## 2018-05-20 PROCEDURE — 94640 AIRWAY INHALATION TREATMENT: CPT

## 2018-05-20 PROCEDURE — 25000132 ZZH RX MED GY IP 250 OP 250 PS 637: Performed by: STUDENT IN AN ORGANIZED HEALTH CARE EDUCATION/TRAINING PROGRAM

## 2018-05-20 PROCEDURE — 25000132 ZZH RX MED GY IP 250 OP 250 PS 637: Mod: GY | Performed by: NURSE PRACTITIONER

## 2018-05-20 PROCEDURE — 20000004 ZZH R&B ICU UMMC

## 2018-05-20 PROCEDURE — 25000125 ZZHC RX 250: Performed by: STUDENT IN AN ORGANIZED HEALTH CARE EDUCATION/TRAINING PROGRAM

## 2018-05-20 PROCEDURE — 25000132 ZZH RX MED GY IP 250 OP 250 PS 637: Mod: GY | Performed by: STUDENT IN AN ORGANIZED HEALTH CARE EDUCATION/TRAINING PROGRAM

## 2018-05-20 PROCEDURE — 25000132 ZZH RX MED GY IP 250 OP 250 PS 637: Performed by: NURSE PRACTITIONER

## 2018-05-20 PROCEDURE — 25000128 H RX IP 250 OP 636: Performed by: STUDENT IN AN ORGANIZED HEALTH CARE EDUCATION/TRAINING PROGRAM

## 2018-05-20 PROCEDURE — 25000132 ZZH RX MED GY IP 250 OP 250 PS 637: Mod: GY | Performed by: INTERNAL MEDICINE

## 2018-05-20 PROCEDURE — A9270 NON-COVERED ITEM OR SERVICE: HCPCS | Mod: GY | Performed by: NURSE PRACTITIONER

## 2018-05-20 PROCEDURE — 40000275 ZZH STATISTIC RCP TIME EA 10 MIN

## 2018-05-20 PROCEDURE — 00000146 ZZHCL STATISTIC GLUCOSE BY METER IP

## 2018-05-20 RX ADMIN — FLUTICASONE PROPIONATE 1 SPRAY: 50 SPRAY, METERED NASAL at 20:21

## 2018-05-20 RX ADMIN — DOPAMINE HYDROCHLORIDE IN DEXTROSE 2.5 MCG/KG/MIN: 1.6 INJECTION, SOLUTION INTRAVENOUS at 22:01

## 2018-05-20 RX ADMIN — CALCIUM ACETATE 667 MG: 667 CAPSULE ORAL at 12:06

## 2018-05-20 RX ADMIN — ATORVASTATIN CALCIUM 40 MG: 40 TABLET, FILM COATED ORAL at 20:21

## 2018-05-20 RX ADMIN — Medication 1 CAPSULE: at 07:58

## 2018-05-20 RX ADMIN — ALTEPLASE 1 MG: 2.2 INJECTION, POWDER, LYOPHILIZED, FOR SOLUTION INTRAVENOUS at 06:55

## 2018-05-20 RX ADMIN — ALTEPLASE 1 MG: 2.2 INJECTION, POWDER, LYOPHILIZED, FOR SOLUTION INTRAVENOUS at 03:35

## 2018-05-20 RX ADMIN — CALCIUM ACETATE 667 MG: 667 CAPSULE ORAL at 18:40

## 2018-05-20 RX ADMIN — DOBUTAMINE HYDROCHLORIDE 2.5 MCG/KG/MIN: 400 INJECTION INTRAVENOUS at 22:04

## 2018-05-20 RX ADMIN — ALLOPURINOL 100 MG: 100 TABLET ORAL at 07:59

## 2018-05-20 RX ADMIN — SENNOSIDES AND DOCUSATE SODIUM 1 TABLET: 8.6; 5 TABLET ORAL at 07:59

## 2018-05-20 RX ADMIN — ALBUTEROL SULFATE 2.5 MG: 2.5 SOLUTION RESPIRATORY (INHALATION) at 22:09

## 2018-05-20 RX ADMIN — VITAMIN B12 0.1 MG ORAL TABLET 100 MCG: 0.1 TABLET ORAL at 07:59

## 2018-05-20 RX ADMIN — Medication 100 MG: at 07:59

## 2018-05-20 RX ADMIN — INSULIN ASPART 2 UNITS: 100 INJECTION, SOLUTION INTRAVENOUS; SUBCUTANEOUS at 14:46

## 2018-05-20 RX ADMIN — HYDRALAZINE HYDROCHLORIDE 25 MG: 25 TABLET ORAL at 08:03

## 2018-05-20 RX ADMIN — HYDRALAZINE HYDROCHLORIDE 25 MG: 25 TABLET ORAL at 18:40

## 2018-05-20 RX ADMIN — INSULIN ASPART 2 UNITS: 100 INJECTION, SOLUTION INTRAVENOUS; SUBCUTANEOUS at 20:19

## 2018-05-20 RX ADMIN — SENNOSIDES AND DOCUSATE SODIUM 1 TABLET: 8.6; 5 TABLET ORAL at 20:21

## 2018-05-20 RX ADMIN — INSULIN GLARGINE 10 UNITS: 100 INJECTION, SOLUTION SUBCUTANEOUS at 08:10

## 2018-05-20 RX ADMIN — CALCIUM ACETATE 667 MG: 667 CAPSULE ORAL at 07:59

## 2018-05-20 RX ADMIN — HYDRALAZINE HYDROCHLORIDE 25 MG: 25 TABLET ORAL at 12:06

## 2018-05-20 RX ADMIN — ALTEPLASE 2 MG: 2.2 INJECTION, POWDER, LYOPHILIZED, FOR SOLUTION INTRAVENOUS at 06:55

## 2018-05-20 RX ADMIN — DOBUTAMINE HYDROCHLORIDE 2.5 MCG/KG/MIN: 400 INJECTION INTRAVENOUS at 14:45

## 2018-05-20 RX ADMIN — OMEPRAZOLE 20 MG: 20 CAPSULE, DELAYED RELEASE ORAL at 20:21

## 2018-05-20 RX ADMIN — CETIRIZINE HYDROCHLORIDE 10 MG: 10 TABLET, FILM COATED ORAL at 07:59

## 2018-05-20 RX ADMIN — ASPIRIN 81 MG CHEWABLE TABLET 81 MG: 81 TABLET CHEWABLE at 20:21

## 2018-05-20 RX ADMIN — INSULIN ASPART 1 UNITS: 100 INJECTION, SOLUTION INTRAVENOUS; SUBCUTANEOUS at 14:46

## 2018-05-20 NOTE — PROGRESS NOTES
"  Advanced Heart Failure and Transplant Cardiology  Progress Note:    Assessment and Plan:  63 year old male with a PMH of CAD, ICM (EF of 15-20%), ESRD, bicuspid aortic valve with AI, severe MR, and proximal AAA who was admitted for decompensated heart failure. He is on dobutamine 2.5 mcg/kg/min and listed for heart/kidney transplant as status 1A.    # Stage D NYHA Class III systolic heart failure secondary to ICM, listed 1A  # Severe mitral regurgitation  # Severe holodiastolic aortic insufficiency   - continue dobutamine 2.5 mcg/kg/min, dopamine 2.5 mcg/kg/min  Sleep apnea: yes, unable to tolerate CPAP  - hydralazine 25 mg q6 hours on non-dialysis days + 10 mg BID on dialysis days (after HD)  - weekly CXR (next 5/21)       # Chronic Medical Issues:  - Seborrheic keratoses / Dermatofibromas / Multiple benign nevi / Nummular dermatitis: derm consulted, all lesions benign  - Hx of allergic rhinitis: cetrizine  - ESRD: Dialysis T, Th, Sa   - Non-obstructive CAD, Anomalous RCA: ASA 81 mg, atorvastatin 40 mg daily  - Ascending aortic aneurysm: 4.5 x 4.5 cm proximal ascending aortic aneurysm seen on MRI 2/14  - Multiple benign branch duct-IPMNs: no concerning features affecting transplant candidacy      FEN: regular diet, 2L FR  PROPHY: ambulation  LINES: PICC  DISPO: TBD  CODE STATUS:  Full    Discussed with dr. ranjana Stephenson MD PGY4  Cardiology Fellow  [phone]    ================================================================    Subjective/24-Hr Events:   - no acute issues overnight.  Feels good without complaints.  PA port was clotted, now working.  Wants to try to get more fluid off at next dialysis session.    ROS:  4 point ROS including respiratory, CV, GI and  (other than that noted in the HPI) is negative.     Medications: Reviewed in EPIC.     Physical Exam:   BP 98/66  Pulse 107  Temp 98.7  F (37.1  C) (Oral)  Resp 16  Ht 1.727 m (5' 8\")  Wt 80.1 kg (176 lb 9.4 oz)  SpO2 97%  BMI " 26.85 kg/m2    GENERAL: A&O x 3, NAD  NECK: no JVD, L-sided [place] in place bandaged and c/d/i  CV: Mildly tachycardic, +S1S2, soft murmur across precordium  RESPIRATORY: CTAB  GI: soft, NT, ND  EXTREMITIES: No peripheral edema. 2+ bilateral pedal pulses. Warm.   NEUROLOGIC: Alert and oriented x 3. No focal deficits.   SKIN: No jaundice. No rashes or lesions.     Labs / Imaging:  - all labs and imaging reviewed

## 2018-05-20 NOTE — PLAN OF CARE
Problem: Patient Care Overview  Goal: Plan of Care/Patient Progress Review  Outcome: No Change  Pt hemodynamically stable. Drips remain the same with Dobutamine and Dopamine both running at 2.5 mcg. Pt was able to sleep throughout the night. PAC clotted off. Alteplase was instilled into line, this was not effective in disrupting the clot. MD notified.

## 2018-05-20 NOTE — PROGRESS NOTES
HEMODIALYSIS TREATMENT NOTE    Date: 5/19/2018  Time: 10:06 PM    Data:  Pre Wt: 80.1 kg (176 lb 9.4 oz)   Desired Wt: 77 kg   Post Wt: 77 kg (169 lb 12.1 oz)  Weight gain: -3.1 kg   Weight change: 3.1 kg  Ultrafiltration - Post Run Net Total Removed (mL): 3100 mL  Ultrafiltration - Post Run Net Total Gain (mL): 0 mL  Vascular Access Status: Yes, secured and visible  Dialyzer Rinse: Clear  Total Blood Volume Processed: 65.8  Total Dialysis (Treatment) Time:  3.5 hours    Lab:   No    Interventions/ Assessment:  Stable HD and UF tx.  Tolerated well, no adverse events noted.  CVC positional at times with arterial pressure alarms.  BFR titrated betwenn 300-400 per arterial pressure allowance.  Low dose heparin used during tx.  CVC heparin locked per order.  hectoral and epogen administered per order.  Maintained SBP >95 and MAP >65 throughout tx.  -110's in ST.  's-110's.  Was on 3L O2 via NC upon starting HD, pt removed NC while eating with sats remaining >95%.  On RA upon ending HD tx.  Hand off given to ICU RN.     Plan:    Next HD Tuesday 5/22/18, or per renal team.

## 2018-05-20 NOTE — PLAN OF CARE
Problem: Patient Care Overview  Goal: Individualization & Mutuality  Outcome: No Change  Patient alert and oriented throughout shift. Hemodynamic supported with Dobutamine 2.5 mcg/kg/min and Dopamine 2.5 mcg/kg/min. CO: 4.7, CI: 2.4, SVR: 1295, and SVRI: 2500. Tolerates PO intake. No void, on hemodialysis. Patient ambulated with nurse x1 today. Family and friends supportive at patients bedside this afternoon.

## 2018-05-21 ENCOUNTER — APPOINTMENT (OUTPATIENT)
Dept: OCCUPATIONAL THERAPY | Facility: CLINIC | Age: 63
DRG: 001 | End: 2018-05-21
Attending: INTERNAL MEDICINE
Payer: COMMERCIAL

## 2018-05-21 LAB
BASE EXCESS BLDV CALC-SCNC: 6.7 MMOL/L
BASE EXCESS BLDV CALC-SCNC: 6.7 MMOL/L
GLUCOSE BLDC GLUCOMTR-MCNC: 104 MG/DL (ref 70–99)
GLUCOSE BLDC GLUCOMTR-MCNC: 118 MG/DL (ref 70–99)
GLUCOSE BLDC GLUCOMTR-MCNC: 125 MG/DL (ref 70–99)
GLUCOSE BLDC GLUCOMTR-MCNC: 190 MG/DL (ref 70–99)
GLUCOSE BLDC GLUCOMTR-MCNC: 72 MG/DL (ref 70–99)
HCO3 BLDV-SCNC: 30 MMOL/L (ref 21–28)
HCO3 BLDV-SCNC: 31 MMOL/L (ref 21–28)
MAGNESIUM SERPL-MCNC: 1.8 MG/DL (ref 1.6–2.3)
O2/TOTAL GAS SETTING VFR VENT: 21 %
O2/TOTAL GAS SETTING VFR VENT: 21 %
OXYHGB MFR BLDV: 51 %
OXYHGB MFR BLDV: 55 %
PCO2 BLDV: 38 MM HG (ref 40–50)
PCO2 BLDV: 42 MM HG (ref 40–50)
PH BLDV: 7.48 PH (ref 7.32–7.43)
PH BLDV: 7.51 PH (ref 7.32–7.43)
PO2 BLDV: 30 MM HG (ref 25–47)
PO2 BLDV: 31 MM HG (ref 25–47)
POTASSIUM BLD-SCNC: 4.8 MMOL/L (ref 3.4–5.3)

## 2018-05-21 PROCEDURE — 25000132 ZZH RX MED GY IP 250 OP 250 PS 637: Mod: GY | Performed by: STUDENT IN AN ORGANIZED HEALTH CARE EDUCATION/TRAINING PROGRAM

## 2018-05-21 PROCEDURE — A9270 NON-COVERED ITEM OR SERVICE: HCPCS | Mod: GY | Performed by: STUDENT IN AN ORGANIZED HEALTH CARE EDUCATION/TRAINING PROGRAM

## 2018-05-21 PROCEDURE — A9270 NON-COVERED ITEM OR SERVICE: HCPCS | Mod: GY | Performed by: INTERNAL MEDICINE

## 2018-05-21 PROCEDURE — 40000196 ZZH STATISTIC RAPCV CVP MONITORING

## 2018-05-21 PROCEDURE — 25000132 ZZH RX MED GY IP 250 OP 250 PS 637: Performed by: NURSE PRACTITIONER

## 2018-05-21 PROCEDURE — 40000133 ZZH STATISTIC OT WARD VISIT: Performed by: OCCUPATIONAL THERAPIST

## 2018-05-21 PROCEDURE — 97110 THERAPEUTIC EXERCISES: CPT | Mod: GO | Performed by: OCCUPATIONAL THERAPIST

## 2018-05-21 PROCEDURE — 40000048 ZZH STATISTIC DAILY SWAN MONITORING

## 2018-05-21 PROCEDURE — A9270 NON-COVERED ITEM OR SERVICE: HCPCS | Mod: GY | Performed by: NURSE PRACTITIONER

## 2018-05-21 PROCEDURE — 82805 BLOOD GASES W/O2 SATURATION: CPT | Performed by: INTERNAL MEDICINE

## 2018-05-21 PROCEDURE — 00000146 ZZHCL STATISTIC GLUCOSE BY METER IP

## 2018-05-21 PROCEDURE — 83735 ASSAY OF MAGNESIUM: CPT | Performed by: INTERNAL MEDICINE

## 2018-05-21 PROCEDURE — 25000132 ZZH RX MED GY IP 250 OP 250 PS 637: Mod: GY | Performed by: INTERNAL MEDICINE

## 2018-05-21 PROCEDURE — 99232 SBSQ HOSP IP/OBS MODERATE 35: CPT | Mod: GC | Performed by: INTERNAL MEDICINE

## 2018-05-21 PROCEDURE — 97535 SELF CARE MNGMENT TRAINING: CPT | Mod: GO | Performed by: OCCUPATIONAL THERAPIST

## 2018-05-21 PROCEDURE — 20000004 ZZH R&B ICU UMMC

## 2018-05-21 PROCEDURE — 84132 ASSAY OF SERUM POTASSIUM: CPT | Performed by: INTERNAL MEDICINE

## 2018-05-21 PROCEDURE — 25000132 ZZH RX MED GY IP 250 OP 250 PS 637: Mod: GY | Performed by: NURSE PRACTITIONER

## 2018-05-21 RX ORDER — HYDRALAZINE HYDROCHLORIDE 10 MG/1
10 TABLET, FILM COATED ORAL
Status: DISCONTINUED | OUTPATIENT
Start: 2018-05-22 | End: 2018-05-21

## 2018-05-21 RX ORDER — HYDRALAZINE HYDROCHLORIDE 10 MG/1
10 TABLET, FILM COATED ORAL
Status: DISCONTINUED | OUTPATIENT
Start: 2018-05-22 | End: 2018-05-22

## 2018-05-21 RX ADMIN — VITAMIN B12 0.1 MG ORAL TABLET 100 MCG: 0.1 TABLET ORAL at 08:20

## 2018-05-21 RX ADMIN — CALCIUM ACETATE 667 MG: 667 CAPSULE ORAL at 08:20

## 2018-05-21 RX ADMIN — CALCIUM ACETATE 667 MG: 667 CAPSULE ORAL at 13:24

## 2018-05-21 RX ADMIN — OMEPRAZOLE 20 MG: 20 CAPSULE, DELAYED RELEASE ORAL at 20:36

## 2018-05-21 RX ADMIN — Medication 1 CAPSULE: at 08:19

## 2018-05-21 RX ADMIN — Medication 100 MG: at 08:20

## 2018-05-21 RX ADMIN — Medication 12.5 MG: at 13:24

## 2018-05-21 RX ADMIN — ASPIRIN 81 MG CHEWABLE TABLET 81 MG: 81 TABLET CHEWABLE at 20:35

## 2018-05-21 RX ADMIN — Medication 12.5 MG: at 17:48

## 2018-05-21 RX ADMIN — SENNOSIDES AND DOCUSATE SODIUM 1 TABLET: 8.6; 5 TABLET ORAL at 20:37

## 2018-05-21 RX ADMIN — INSULIN ASPART 5 UNITS: 100 INJECTION, SOLUTION INTRAVENOUS; SUBCUTANEOUS at 13:25

## 2018-05-21 RX ADMIN — Medication 12.5 MG: at 21:34

## 2018-05-21 RX ADMIN — ALLOPURINOL 100 MG: 100 TABLET ORAL at 08:20

## 2018-05-21 RX ADMIN — CALCIUM ACETATE 667 MG: 667 CAPSULE ORAL at 17:48

## 2018-05-21 RX ADMIN — ATORVASTATIN CALCIUM 40 MG: 40 TABLET, FILM COATED ORAL at 20:36

## 2018-05-21 RX ADMIN — HYDRALAZINE HYDROCHLORIDE 25 MG: 25 TABLET ORAL at 08:28

## 2018-05-21 RX ADMIN — INSULIN GLARGINE 10 UNITS: 100 INJECTION, SOLUTION SUBCUTANEOUS at 08:21

## 2018-05-21 RX ADMIN — SENNOSIDES AND DOCUSATE SODIUM 1 TABLET: 8.6; 5 TABLET ORAL at 08:19

## 2018-05-21 RX ADMIN — CETIRIZINE HYDROCHLORIDE 10 MG: 10 TABLET, FILM COATED ORAL at 08:20

## 2018-05-21 RX ADMIN — HYDRALAZINE HYDROCHLORIDE 25 MG: 25 TABLET ORAL at 00:52

## 2018-05-21 NOTE — PROGRESS NOTES
"  Advanced Heart Failure and Transplant Cardiology  Progress Note:    Assessment and Plan:  63 year old male with a PMH of CAD, ICM (EF of 15-20%), ESRD, bicuspid aortic valve with AI, severe MR, and proximal AAA who was admitted for decompensated heart failure. He is on dobutamine 2.5 mcg/kg/min and listed for heart/kidney transplant as status 1A.    # Stage D NYHA Class III systolic heart failure secondary to ICM, listed 1A  # Severe mitral regurgitation  # Severe holodiastolic aortic insufficiency   - continue dobutamine 2.5 mcg/kg/min, dopamine 2.5 mcg/kg/min  Sleep apnea: yes, unable to tolerate CPAP  - hydralazine 25 mg q6 hours on non-dialysis days + 10 mg BID on dialysis days (after HD)  - weekly CXR (next 5/21)       # Chronic Medical Issues:  - Seborrheic keratoses / Dermatofibromas / Multiple benign nevi / Nummular dermatitis: derm consulted, all lesions benign  - Hx of allergic rhinitis: cetrizine  - ESRD: Dialysis T, Th, Sa   - Non-obstructive CAD, Anomalous RCA: ASA 81 mg, atorvastatin 40 mg daily  - Ascending aortic aneurysm: 4.5 x 4.5 cm proximal ascending aortic aneurysm seen on MRI 2/14  - Multiple benign branch duct-IPMNs: no concerning features affecting transplant candidacy      FEN: regular diet, 2L FR  PROPHY: ambulation  LINES: PICC  DISPO: TBD  CODE STATUS:  Full    Seen and discussed with Dr. Rei Mejía, CVD Fellow    ================================================================    Subjective/24-Hr Events:   - no acute issues overnight    ROS:  4 point ROS including respiratory, CV, GI and  (other than that noted in the HPI) is negative.     Medications: Reviewed in EPIC.     Physical Exam:   BP 90/53  Pulse 107  Temp 98  F (36.7  C) (Oral)  Resp 16  Ht 1.727 m (5' 8\")  Wt 77.7 kg (171 lb 4.8 oz)  SpO2 99%  BMI 26.05 kg/m2    GENERAL: A&O x 3, NAD  NECK: no JVD, L-sided [place] in place bandaged and c/d/i  CV: Mildly tachycardic, +S1S2, soft murmur across " precordium  RESPIRATORY: CTAB  GI: soft, NT, ND  EXTREMITIES: No peripheral edema. 2+ bilateral pedal pulses. Warm.   NEUROLOGIC: Alert and oriented x 3. No focal deficits.   SKIN: No jaundice. No rashes or lesions.     Labs / Imaging:  - all labs and imaging reviewed

## 2018-05-21 NOTE — PLAN OF CARE
Problem: Patient Care Overview  Goal: Plan of Care/Patient Progress Review  Outcome: No Change  A/Ox4. VSS on 2 LPM NC and room air. Per orders, do not wake overnight. Sinus tach 100's-110's with rare PVC. Continues on dopamine at 2.5 mcg/kg/min and dobutamine at 2.5 mcg/kg/min. PAP 50/28, CVP 5. CI 2.3. SVR 1443. Anuric. Continue with POC. Notify Cards 2 with concerns.

## 2018-05-21 NOTE — PLAN OF CARE
Problem: Patient Care Overview  Goal: Plan of Care/Patient Progress Review  OT/CR 4E: Discharge Planner OT   Patient plan for discharge: pt hopeful to return home pending outcome of anticipated heart/kidney transplant  Current status: Pt remains safe and independent with basic mobility and self cares.  Limited by fatigue and SOB.  Pt tolerates ambulating 1500+ ft with SBA and 1 standing rest break.  Good participation in standing BLE proximal muscle strengthening exercises.  VSS on RA with exception of soft BPs - Pre-activity 90/53 - Post-activity 80/50 - RN informed.  Barriers to return to prior living situation: acute medical needs - awaiting transplant  Recommendations for discharge: current recommendation is for return to home with OP CR   Rationale for recommendations: Pt is currently safe and independent with basic self cares and mobility - will update recommendation post transplant.         Entered by: Lynne Garcia 05/21/2018 11:02 AM

## 2018-05-21 NOTE — PLAN OF CARE
Problem: Cardiac: Heart Failure (Adult)  Goal: Signs and Symptoms of Listed Potential Problems Will be Absent, Minimized or Managed (Cardiac: Heart Failure)  Signs and symptoms of listed potential problems will be absent, minimized or managed by discharge/transition of care (reference Cardiac: Heart Failure (Adult) CPG).   Outcome: No Change  Shift durration: 1044-7706    LOC: alert, oriented x4  Neuro: no focal deficits except SOB with exertion.  Cards: MAP's ~ 70's, HR ~110's.  Dobutamine 2.5, Dopamine 2.5.  SVO2 51, SVR 1461, CI 2.1, CO 4.1, PA pressure 52/32, CVP 8 via swan at 1800.  Pulm: LS clear, RA.  GI/: anuric, pt on HD Tue, Thur, Sa.  Pt had BM today.  Regular diet, compliant with 2L fluid restriction.  Skin: no changes.  Mobility: independent, SBA d/t lines/swan  Drains/Devices: L IJ [place] at 64, R subclavian HD line, L PICC  Special/Event: Listed for kidney and heart, Status 1A.  May consider swan holiday later this week per Cards 2, otherwise continue to trend Kassi numbers q12H and monitor as Status 1A transplant pt.

## 2018-05-22 ENCOUNTER — APPOINTMENT (OUTPATIENT)
Dept: GENERAL RADIOLOGY | Facility: CLINIC | Age: 63
DRG: 001 | End: 2018-05-22
Attending: INTERNAL MEDICINE
Payer: COMMERCIAL

## 2018-05-22 LAB
ANION GAP SERPL CALCULATED.3IONS-SCNC: 8 MMOL/L (ref 3–14)
BASE EXCESS BLDV CALC-SCNC: 10.8 MMOL/L
BASE EXCESS BLDV CALC-SCNC: 5.1 MMOL/L
BUN SERPL-MCNC: 40 MG/DL (ref 7–30)
CALCIUM SERPL-MCNC: 10.2 MG/DL (ref 8.5–10.1)
CHLORIDE SERPL-SCNC: 102 MMOL/L (ref 94–109)
CO2 SERPL-SCNC: 28 MMOL/L (ref 20–32)
CREAT SERPL-MCNC: 8.35 MG/DL (ref 0.66–1.25)
ERYTHROCYTE [DISTWIDTH] IN BLOOD BY AUTOMATED COUNT: 16.4 % (ref 10–15)
GFR SERPL CREATININE-BSD FRML MDRD: 7 ML/MIN/1.7M2
GLUCOSE BLDC GLUCOMTR-MCNC: 112 MG/DL (ref 70–99)
GLUCOSE BLDC GLUCOMTR-MCNC: 136 MG/DL (ref 70–99)
GLUCOSE BLDC GLUCOMTR-MCNC: 161 MG/DL (ref 70–99)
GLUCOSE SERPL-MCNC: 104 MG/DL (ref 70–99)
HCO3 BLDV-SCNC: 29 MMOL/L (ref 21–28)
HCO3 BLDV-SCNC: 35 MMOL/L (ref 21–28)
HCT VFR BLD AUTO: 28.3 % (ref 40–53)
HGB BLD-MCNC: 8.8 G/DL (ref 13.3–17.7)
MAGNESIUM SERPL-MCNC: 2 MG/DL (ref 1.6–2.3)
MCH RBC QN AUTO: 29.4 PG (ref 26.5–33)
MCHC RBC AUTO-ENTMCNC: 31.1 G/DL (ref 31.5–36.5)
MCV RBC AUTO: 95 FL (ref 78–100)
O2/TOTAL GAS SETTING VFR VENT: 21 %
O2/TOTAL GAS SETTING VFR VENT: 21 %
OXYHGB MFR BLDV: 52 %
OXYHGB MFR BLDV: 57 %
PCO2 BLDV: 41 MM HG (ref 40–50)
PCO2 BLDV: 42 MM HG (ref 40–50)
PH BLDV: 7.46 PH (ref 7.32–7.43)
PH BLDV: 7.53 PH (ref 7.32–7.43)
PHOSPHATE SERPL-MCNC: 2.8 MG/DL (ref 2.5–4.5)
PLATELET # BLD AUTO: 212 10E9/L (ref 150–450)
PO2 BLDV: 31 MM HG (ref 25–47)
PO2 BLDV: 32 MM HG (ref 25–47)
POTASSIUM SERPL-SCNC: 5 MMOL/L (ref 3.4–5.3)
RBC # BLD AUTO: 2.99 10E12/L (ref 4.4–5.9)
SODIUM SERPL-SCNC: 138 MMOL/L (ref 133–144)
WBC # BLD AUTO: 5.5 10E9/L (ref 4–11)

## 2018-05-22 PROCEDURE — 40000048 ZZH STATISTIC DAILY SWAN MONITORING

## 2018-05-22 PROCEDURE — 63400005 ZZH RX 634: Performed by: INTERNAL MEDICINE

## 2018-05-22 PROCEDURE — 82805 BLOOD GASES W/O2 SATURATION: CPT | Performed by: INTERNAL MEDICINE

## 2018-05-22 PROCEDURE — A9270 NON-COVERED ITEM OR SERVICE: HCPCS | Mod: GY | Performed by: INTERNAL MEDICINE

## 2018-05-22 PROCEDURE — A9270 NON-COVERED ITEM OR SERVICE: HCPCS | Mod: GY | Performed by: STUDENT IN AN ORGANIZED HEALTH CARE EDUCATION/TRAINING PROGRAM

## 2018-05-22 PROCEDURE — A9270 NON-COVERED ITEM OR SERVICE: HCPCS | Mod: GY | Performed by: NURSE PRACTITIONER

## 2018-05-22 PROCEDURE — 83735 ASSAY OF MAGNESIUM: CPT | Performed by: NURSE PRACTITIONER

## 2018-05-22 PROCEDURE — 85027 COMPLETE CBC AUTOMATED: CPT | Performed by: NURSE PRACTITIONER

## 2018-05-22 PROCEDURE — 20000004 ZZH R&B ICU UMMC

## 2018-05-22 PROCEDURE — 71045 X-RAY EXAM CHEST 1 VIEW: CPT

## 2018-05-22 PROCEDURE — 99232 SBSQ HOSP IP/OBS MODERATE 35: CPT | Mod: GC | Performed by: INTERNAL MEDICINE

## 2018-05-22 PROCEDURE — 25000128 H RX IP 250 OP 636: Performed by: STUDENT IN AN ORGANIZED HEALTH CARE EDUCATION/TRAINING PROGRAM

## 2018-05-22 PROCEDURE — 25000132 ZZH RX MED GY IP 250 OP 250 PS 637: Performed by: STUDENT IN AN ORGANIZED HEALTH CARE EDUCATION/TRAINING PROGRAM

## 2018-05-22 PROCEDURE — 25000132 ZZH RX MED GY IP 250 OP 250 PS 637: Performed by: NURSE PRACTITIONER

## 2018-05-22 PROCEDURE — 80048 BASIC METABOLIC PNL TOTAL CA: CPT | Performed by: NURSE PRACTITIONER

## 2018-05-22 PROCEDURE — 25000128 H RX IP 250 OP 636: Performed by: INTERNAL MEDICINE

## 2018-05-22 PROCEDURE — 25000125 ZZHC RX 250: Performed by: STUDENT IN AN ORGANIZED HEALTH CARE EDUCATION/TRAINING PROGRAM

## 2018-05-22 PROCEDURE — 25000132 ZZH RX MED GY IP 250 OP 250 PS 637: Performed by: INTERNAL MEDICINE

## 2018-05-22 PROCEDURE — 40000275 ZZH STATISTIC RCP TIME EA 10 MIN

## 2018-05-22 PROCEDURE — 90937 HEMODIALYSIS REPEATED EVAL: CPT

## 2018-05-22 PROCEDURE — 00000146 ZZHCL STATISTIC GLUCOSE BY METER IP

## 2018-05-22 PROCEDURE — 84100 ASSAY OF PHOSPHORUS: CPT | Performed by: NURSE PRACTITIONER

## 2018-05-22 PROCEDURE — 25000132 ZZH RX MED GY IP 250 OP 250 PS 637: Mod: GY | Performed by: NURSE PRACTITIONER

## 2018-05-22 PROCEDURE — 40000196 ZZH STATISTIC RAPCV CVP MONITORING

## 2018-05-22 PROCEDURE — 25000132 ZZH RX MED GY IP 250 OP 250 PS 637: Mod: GY | Performed by: STUDENT IN AN ORGANIZED HEALTH CARE EDUCATION/TRAINING PROGRAM

## 2018-05-22 RX ORDER — HEPARIN SODIUM 1000 [USP'U]/ML
500 INJECTION, SOLUTION INTRAVENOUS; SUBCUTANEOUS
Status: COMPLETED | OUTPATIENT
Start: 2018-05-22 | End: 2018-05-22

## 2018-05-22 RX ORDER — HYDRALAZINE HYDROCHLORIDE 10 MG/1
10 TABLET, FILM COATED ORAL
Status: DISCONTINUED | OUTPATIENT
Start: 2018-05-22 | End: 2018-05-23

## 2018-05-22 RX ORDER — HYDRALAZINE HYDROCHLORIDE 25 MG/1
25 TABLET, FILM COATED ORAL
Status: DISCONTINUED | OUTPATIENT
Start: 2018-05-23 | End: 2018-05-23

## 2018-05-22 RX ORDER — HEPARIN SODIUM 1000 [USP'U]/ML
500 INJECTION, SOLUTION INTRAVENOUS; SUBCUTANEOUS CONTINUOUS
Status: DISCONTINUED | OUTPATIENT
Start: 2018-05-22 | End: 2018-05-22

## 2018-05-22 RX ADMIN — INSULIN GLARGINE 10 UNITS: 100 INJECTION, SOLUTION SUBCUTANEOUS at 08:27

## 2018-05-22 RX ADMIN — FLUTICASONE PROPIONATE 2 SPRAY: 50 SPRAY, METERED NASAL at 20:45

## 2018-05-22 RX ADMIN — CALCIUM ACETATE 667 MG: 667 CAPSULE ORAL at 14:56

## 2018-05-22 RX ADMIN — HEPARIN SODIUM 3000 UNITS: 1000 INJECTION, SOLUTION INTRAVENOUS; SUBCUTANEOUS at 14:22

## 2018-05-22 RX ADMIN — CETIRIZINE HYDROCHLORIDE 10 MG: 10 TABLET, FILM COATED ORAL at 07:59

## 2018-05-22 RX ADMIN — Medication 100 MG: at 07:58

## 2018-05-22 RX ADMIN — ALBUTEROL SULFATE 2.5 MG: 2.5 SOLUTION RESPIRATORY (INHALATION) at 21:43

## 2018-05-22 RX ADMIN — HYDRALAZINE HYDROCHLORIDE 10 MG: 10 TABLET, FILM COATED ORAL at 14:56

## 2018-05-22 RX ADMIN — ATORVASTATIN CALCIUM 40 MG: 40 TABLET, FILM COATED ORAL at 20:43

## 2018-05-22 RX ADMIN — HEPARIN SODIUM 500 UNITS: 1000 INJECTION, SOLUTION INTRAVENOUS; SUBCUTANEOUS at 11:03

## 2018-05-22 RX ADMIN — EPOETIN ALFA 4000 UNITS: 10000 SOLUTION INTRAVENOUS; SUBCUTANEOUS at 11:04

## 2018-05-22 RX ADMIN — CALCIUM ACETATE 667 MG: 667 CAPSULE ORAL at 19:38

## 2018-05-22 RX ADMIN — Medication 1 CAPSULE: at 07:58

## 2018-05-22 RX ADMIN — VITAMIN B12 0.1 MG ORAL TABLET 100 MCG: 0.1 TABLET ORAL at 07:59

## 2018-05-22 RX ADMIN — SENNOSIDES AND DOCUSATE SODIUM 1 TABLET: 8.6; 5 TABLET ORAL at 07:59

## 2018-05-22 RX ADMIN — INSULIN ASPART 4 UNITS: 100 INJECTION, SOLUTION INTRAVENOUS; SUBCUTANEOUS at 16:31

## 2018-05-22 RX ADMIN — SENNOSIDES AND DOCUSATE SODIUM 1 TABLET: 8.6; 5 TABLET ORAL at 20:43

## 2018-05-22 RX ADMIN — CALCIUM ACETATE 667 MG: 667 CAPSULE ORAL at 07:59

## 2018-05-22 RX ADMIN — HYDRALAZINE HYDROCHLORIDE 10 MG: 10 TABLET, FILM COATED ORAL at 19:39

## 2018-05-22 RX ADMIN — ASPIRIN 81 MG CHEWABLE TABLET 81 MG: 81 TABLET CHEWABLE at 20:43

## 2018-05-22 RX ADMIN — IRON SUCROSE 50 MG: 20 INJECTION, SOLUTION INTRAVENOUS at 11:08

## 2018-05-22 RX ADMIN — HEPARIN SODIUM 500 UNITS/HR: 1000 INJECTION, SOLUTION INTRAVENOUS; SUBCUTANEOUS at 11:03

## 2018-05-22 RX ADMIN — OMEPRAZOLE 20 MG: 20 CAPSULE, DELAYED RELEASE ORAL at 20:44

## 2018-05-22 RX ADMIN — SODIUM CHLORIDE 250 ML: 9 INJECTION, SOLUTION INTRAVENOUS at 11:02

## 2018-05-22 RX ADMIN — ALLOPURINOL 100 MG: 100 TABLET ORAL at 07:58

## 2018-05-22 RX ADMIN — DOPAMINE HYDROCHLORIDE IN DEXTROSE 2.5 MCG/KG/MIN: 1.6 INJECTION, SOLUTION INTRAVENOUS at 16:05

## 2018-05-22 RX ADMIN — SODIUM CHLORIDE 300 ML: 9 INJECTION, SOLUTION INTRAVENOUS at 11:02

## 2018-05-22 RX ADMIN — HEPARIN SODIUM 3000 UNITS: 1000 INJECTION, SOLUTION INTRAVENOUS; SUBCUTANEOUS at 14:21

## 2018-05-22 NOTE — PROGRESS NOTES
Nephrology Progress Note  05/22/2018         Assessment & Recommendations:   Murray Nicholson is a 63 yr old male with PMH of HTN, DMII, functionally bicuspid aortic valve, CHF/CM (EF 10-15%), ESRD, admitted with worsening dyspnea/SOB, now on dobutamine drip and being evaluated for heart/kidney transplant.      ESKD: due to DMII, on PD since Aug 2017, changed to iHD 1/2018 due to polymicrobial and fungal peritonitis, now on TTS schedule at [facility] under care of Dr Mcpherson. Access: tunneled RIJ (replaced 4/17/18). Run time: 4 hrs; EDW 76 kg.  - Dialysis per TTS schedule   - Using low dose heparin on HD  - Heparin lock CVC        Volume: EDW 76 kg. RHC 5/1 (done at 77 kg): RA 3, PCW 15; CVP 7   - Much prefer standing weights if possible  - Will reduce EDW from 77 to 76 kg today due to patient feeling somewhat SOB when lying flat at night    Severe AV and MR insufficiency:   BP/congestive CM (EF 10-15%); minimal CAD per angio on 2/8/17. Chronically hypotensive with tachycardia  - Goal MAP > 65 and SBP > 95 while dialyzing  - On dobutamine 2.5 mcg/kg/min (started 4/20/18)  - Listed for heart/kidney tx, in ICU on dobutamine and dopamine         Anemia: hgb 9.0, labs 5/8: ferritin 621, IS 27, iron 58; on maintenance venofer 50 mg qTuesday   - Continue venofer qTues  - On epogen 4000 units per HD      BMD: calcium 10/2, alb 3.5, phos 3.6; recent ; Vit D 27; on hectorol 0.5 mcg via HD; on phoslo 1 tab TID with meals.   - Stopping hectorol  - Please check iCa  - Continue Phoslo      Recommendations were communicated to primary team via this note.       MAYTE Garcia  Division of Kidney Disease  [phone]    Interval History :   Doing well with no interval events. Remains in ICU in DB drip.  Denies CP, SOB, chills, n/v.    Review of Systems:   4 point ROS negative other than as noted above.    Physical Exam:   I/O last 3 completed shifts:  In: 1296.4 [P.O.:910; I.V.:386.4]  Out: -    /73  Pulse 107  " Temp 97.9  F (36.6  C) (Oral)  Resp 16  Ht 1.727 m (5' 8\")  Wt 78.3 kg (172 lb 9.9 oz)  SpO2 97%  BMI 26.25 kg/m2     GENERAL APPEARANCE: alert, NAD  EYES: no scleral icterus, pupils equal   Pulmonary: lungs clear to auscultation with equal breath sounds bilaterally; has swan  CV: regular rhythm, tachycardia at baseline   - Edema trace  GI: soft, nontender, normal bowel sounds  MS: no evidence of inflammation in joints, no muscle tenderness  : no doyle  SKIN: no rash, warm, dry, no cyanosis  NEURO: face symmetric, no asterixis   Access: tunneled RIJ       Labs:   All labs reviewed by me  Electrolytes/Renal -   Recent Labs   Lab Test  05/22/18   0531  05/21/18   0559  05/21/18   0558  05/19/18   0557  05/17/18   0602  05/15/18   0402   05/01/18   0930   04/21/18   0640   NA  138   --    --   140  134  135   < >  133   < >  135   POTASSIUM  5.0   --   4.8  4.4  4.0  4.6   < >  5.0   < >  4.0   CHLORIDE  102   --    --   108  99  98   < >  94   < >  96   CO2  28   --    --   24  28  31   < >  31   < >  24   BUN  40*   --    --   28  26  34*   < >  36*   < >  41*   CR  8.35*   --    --   6.17*  6.56*  8.18*   < >  8.51*   < >  7.72*   GLC  104*   --    --   118*  85  88   < >  100*   < >  111*   TRINI  10.2*   --    --   9.2  8.6  9.8   < >  9.7   < >  9.2   MAG  2.0  1.8   --   2.0  1.6  2.0   < >  2.4*   < >  1.6   PHOS   --    --    --    --    --   3.6   --   3.2   --   5.4*    < > = values in this interval not displayed.       CBC -   Recent Labs   Lab Test  05/22/18   0531  05/17/18   0602  05/15/18   0402   WBC  5.5  5.1  5.6   HGB  8.8*  9.0*  8.9*   PLT  212  195  244       LFTs -   Recent Labs   Lab Test  04/16/18   1546  03/07/18   0833  02/19/18   1558  02/02/18   1736   ALKPHOS  123  129  102  86   BILITOTAL  0.8  0.7  0.6  0.4   ALT  22  21  15  16   AST  17  18  18  20   PROTTOTAL  7.4   --   7.2  6.2*   ALBUMIN  3.5   --   2.7*  2.1*       Iron Panel -   Recent Labs   Lab Test  05/08/18   0954  " 07/19/17   1306  07/05/17   1204   IRON  58  46  26*   IRONSAT  27  18  12*   ALBERTINA  621*  369  542*         Imaging:  Reviewed    Current Medications:    sodium chloride 0.9%  250 mL Intravenous Once in dialysis     sodium chloride 0.9%  300 mL Hemodialysis Machine Once     albuterol  2.5 mg Nebulization At Bedtime     allopurinol  100 mg Oral Daily     aspirin  81 mg Oral Daily     atorvastatin  40 mg Oral Daily     calcium acetate  667 mg Oral TID w/meals     cetirizine  10 mg Oral Daily     cyanocolbalamin  100 mcg Oral Daily     epoetin emily (EPOGEN,PROCRIT) inj ESRD  4,000 Units Intravenous Once in dialysis     fluticasone  1-2 spray Both Nostrils Daily     gelatin absorbable  1 each Topical During Hemodialysis (from stock)     heparin (porcine)  500 Units Hemodialysis Machine Once in dialysis     heparin  3 mL Intracatheter During Hemodialysis (from stock)     heparin  3 mL Intracatheter During Hemodialysis (from stock)     [START ON 5/23/2018] hydrALAZINE  25 mg Oral 4 times per day on Sun Mon Wed Fri    And     hydrALAZINE  10 mg Oral 2 times per day on Tue Thu Sat     insulin aspart  1-10 Units Subcutaneous TID AC     insulin aspart  1-7 Units Subcutaneous At Bedtime     insulin aspart   Subcutaneous QAM AC     insulin aspart   Subcutaneous Daily with lunch     insulin aspart   Subcutaneous Daily with supper     insulin glargine  10 Units Subcutaneous QAM AC     iron sucrose  50 mg Intravenous Once in dialysis     NEPHROCAPS  1 capsule Oral Daily     omeprazole  20 mg Oral Daily     polyethylene glycol  17 g Oral Daily     senna-docusate  1 tablet Oral BID     sodium chloride (PF)  10 mL Intracatheter Q7 Days     sodium chloride (PF)  3 mL Intracatheter Q8H     sodium chloride (PF)  3 mL Intracatheter During Hemodialysis (from stock)     sodium chloride (PF)  3 mL Intracatheter During Hemodialysis (from stock)     vitamin  B-1  100 mg Oral Daily       DOBUTamine 2.5 mcg/kg/min (05/22/18 0900)     DOPamine  2.5 mcg/kg/min (05/22/18 0900)     heparin (porcine)       - MEDICATION INSTRUCTIONS -       Reason ACE/ARB/ARNI order not selected       Reason beta blocker order not selected       Kristi Armas PA-C

## 2018-05-22 NOTE — PROGRESS NOTES
"  Advanced Heart Failure and Transplant Cardiology  Progress Note:    Assessment and Plan:  63 year old male with a PMH of CAD, ICM (EF of 15-20%), ESRD, bicuspid aortic valve with AI, severe MR, and proximal AAA who was admitted for decompensated heart failure. He is on dobutamine 2.5 mcg/kg/min and listed for heart/kidney transplant as status 1A.    Major Changes:  - portable CXR to eval PA catheter position    # Stage D NYHA Class III systolic heart failure secondary to ICM, listed 1A  # Severe mitral regurgitation  # Severe holodiastolic aortic insufficiency   - continue dobutamine 2.5 mcg/kg/min, dopamine 2.5 mcg/kg/min  Sleep apnea: yes, unable to tolerate CPAP  - hydralazine 25 mg q6 hours on non-dialysis days + 10 mg BID on dialysis days (after HD)  - weekly CXR (today, then 5/28)       # Chronic Medical Issues:  - Seborrheic keratoses / Dermatofibromas / Multiple benign nevi / Nummular dermatitis: derm consulted, all lesions benign  - Hx of allergic rhinitis: cetrizine  - ESRD: Dialysis T, Th, Sa   - Non-obstructive CAD, Anomalous RCA: ASA 81 mg, atorvastatin 40 mg daily  - Ascending aortic aneurysm: 4.5 x 4.5 cm proximal ascending aortic aneurysm seen on MRI 2/14  - Multiple benign branch duct-IPMNs: no concerning features affecting transplant candidacy      FEN: regular diet, 2L FR  PROPHY: ambulation  LINES: PICC  DISPO: TBD  CODE STATUS:  Full    Seen and discussed with Dr. Rei Mejía, CVD Fellow    ================================================================    Subjective/24-Hr Events:   - no acute issues overnight    ROS:  4 point ROS including respiratory, CV, GI and  (other than that noted in the HPI) is negative.     Medications: Reviewed in EPIC.     Physical Exam:   /73  Pulse 107  Temp 97.9  F (36.6  C) (Oral)  Resp 16  Ht 1.727 m (5' 8\")  Wt 78.3 kg (172 lb 9.9 oz)  SpO2 97%  BMI 26.25 kg/m2    GENERAL: A&O x 3, NAD  NECK: no JVD, L-sided [place] in place bandaged and " c/d/i  CV: Mildly tachycardic, +S1S2, soft murmur across precordium  RESPIRATORY: CTAB  GI: soft, NT, ND  EXTREMITIES: No peripheral edema. 2+ bilateral pedal pulses. Warm.   NEUROLOGIC: Alert and oriented x 3. No focal deficits.   SKIN: No jaundice. No rashes or lesions.     Labs / Imaging:  - all labs and imaging reviewed

## 2018-05-22 NOTE — PLAN OF CARE
Problem: Patient Care Overview  Goal: Plan of Care/Patient Progress Review  Outcome: No Change  VSS. Denies pain. Afebrile. Dialysis run today. 2.3 L pulled, no complications. Dopamine @2.5 mcg/kg/min; dobutamine @2.5 mcg/kg/min. BECKA numbers to be collected at 1800.     Continue to monitor and follow POC. Notify Cards II with any concerns.

## 2018-05-22 NOTE — PROGRESS NOTES
HEMODIALYSIS TREATMENT NOTE    Date: 5/22/2018  Time: 4:25 PM    Data:  Pre Wt: 78.3 kg (172 lb 9.9 oz)   Desired Wt: 76 kg   Post Wt: 76.6 kg (168 lb 14 oz)  Weight gain: -1.7 kg   Weight change: 1.7 kg  Ultrafiltration - Post Run Net Total Removed (mL): 2300 mL  Ultrafiltration - Post Run Net Total Gain (mL): 0 mL  Vascular Access Status: Heparin locked   Dialyzer Rinse: Streaked, Light  Total Blood Volume Processed: 77.6 liters   Total Dialysis (Treatment) Time:  3.5 hours     Lab:   No    Interventions:  Verbal order received from NP (Kristi) to lower EDW from 77kg to 76kg; net UF goal set for 2.3kg per order.   HD lines reversed 2/2 collapsing   CVC locked with heparin after HD.     Assessment:  HD well tolerated by Pt,   HD lines patent after interventions,   Pt denied any dialysis r/t discomfort,   Hand off report given to bedside RN.      Plan:    Next HD per renal team.

## 2018-05-22 NOTE — PROGRESS NOTES
Problem: Cardiac: Heart Failure (Adult)  Goal: Signs and Symptoms of Listed Potential Problems Will be Absent, Minimized or Managed (Cardiac: Heart Failure)  Signs and symptoms of listed potential problems will be absent, minimized or managed by discharge/transition of care (reference Cardiac: Heart Failure (Adult) CPG).   Outcome: No Change    Denies pain, Afebrile. Remained on Dopamine/Dobutamine gtt, CVP 8, PA 46/36-Please see flowsheet for Hemodynamics  Anuric, BMx1. HD today.  Continue to monitor and intervene as indicated.

## 2018-05-23 ENCOUNTER — APPOINTMENT (OUTPATIENT)
Dept: OCCUPATIONAL THERAPY | Facility: CLINIC | Age: 63
DRG: 001 | End: 2018-05-23
Attending: INTERNAL MEDICINE
Payer: COMMERCIAL

## 2018-05-23 LAB
ANION GAP SERPL CALCULATED.3IONS-SCNC: 8 MMOL/L (ref 3–14)
BASE EXCESS BLDV CALC-SCNC: 11.2 MMOL/L
BASE EXCESS BLDV CALC-SCNC: 7.8 MMOL/L
BUN SERPL-MCNC: 21 MG/DL (ref 7–30)
CALCIUM SERPL-MCNC: 8.9 MG/DL (ref 8.5–10.1)
CHLORIDE SERPL-SCNC: 101 MMOL/L (ref 94–109)
CO2 SERPL-SCNC: 30 MMOL/L (ref 20–32)
CREAT SERPL-MCNC: 5.19 MG/DL (ref 0.66–1.25)
ERYTHROCYTE [DISTWIDTH] IN BLOOD BY AUTOMATED COUNT: 16.6 % (ref 10–15)
GFR SERPL CREATININE-BSD FRML MDRD: 11 ML/MIN/1.7M2
GLUCOSE BLDC GLUCOMTR-MCNC: 121 MG/DL (ref 70–99)
GLUCOSE BLDC GLUCOMTR-MCNC: 190 MG/DL (ref 70–99)
GLUCOSE BLDC GLUCOMTR-MCNC: 236 MG/DL (ref 70–99)
GLUCOSE BLDC GLUCOMTR-MCNC: 249 MG/DL (ref 70–99)
GLUCOSE SERPL-MCNC: 113 MG/DL (ref 70–99)
HCO3 BLDV-SCNC: 32 MMOL/L (ref 21–28)
HCO3 BLDV-SCNC: 35 MMOL/L (ref 21–28)
HCT VFR BLD AUTO: 28.5 % (ref 40–53)
HGB BLD-MCNC: 8.9 G/DL (ref 13.3–17.7)
MAGNESIUM SERPL-MCNC: 1.7 MG/DL (ref 1.6–2.3)
MCH RBC QN AUTO: 29.6 PG (ref 26.5–33)
MCHC RBC AUTO-ENTMCNC: 31.2 G/DL (ref 31.5–36.5)
MCV RBC AUTO: 95 FL (ref 78–100)
O2/TOTAL GAS SETTING VFR VENT: 21 %
O2/TOTAL GAS SETTING VFR VENT: 21 %
OXYHGB MFR BLDV: 47 %
OXYHGB MFR BLDV: 54 %
PCO2 BLDV: 43 MM HG (ref 40–50)
PCO2 BLDV: 44 MM HG (ref 40–50)
PH BLDV: 7.48 PH (ref 7.32–7.43)
PH BLDV: 7.51 PH (ref 7.32–7.43)
PLATELET # BLD AUTO: 225 10E9/L (ref 150–450)
PO2 BLDV: 27 MM HG (ref 25–47)
PO2 BLDV: 31 MM HG (ref 25–47)
POTASSIUM SERPL-SCNC: 3.8 MMOL/L (ref 3.4–5.3)
RBC # BLD AUTO: 3.01 10E12/L (ref 4.4–5.9)
SODIUM SERPL-SCNC: 139 MMOL/L (ref 133–144)
WBC # BLD AUTO: 5 10E9/L (ref 4–11)

## 2018-05-23 PROCEDURE — 97110 THERAPEUTIC EXERCISES: CPT | Mod: GO | Performed by: OCCUPATIONAL THERAPIST

## 2018-05-23 PROCEDURE — A9270 NON-COVERED ITEM OR SERVICE: HCPCS | Mod: GY | Performed by: NURSE PRACTITIONER

## 2018-05-23 PROCEDURE — 40000275 ZZH STATISTIC RCP TIME EA 10 MIN

## 2018-05-23 PROCEDURE — 25000132 ZZH RX MED GY IP 250 OP 250 PS 637: Mod: GY | Performed by: STUDENT IN AN ORGANIZED HEALTH CARE EDUCATION/TRAINING PROGRAM

## 2018-05-23 PROCEDURE — 25000125 ZZHC RX 250: Performed by: STUDENT IN AN ORGANIZED HEALTH CARE EDUCATION/TRAINING PROGRAM

## 2018-05-23 PROCEDURE — 82805 BLOOD GASES W/O2 SATURATION: CPT | Performed by: INTERNAL MEDICINE

## 2018-05-23 PROCEDURE — 80048 BASIC METABOLIC PNL TOTAL CA: CPT | Performed by: INTERNAL MEDICINE

## 2018-05-23 PROCEDURE — 25000132 ZZH RX MED GY IP 250 OP 250 PS 637: Mod: GY | Performed by: NURSE PRACTITIONER

## 2018-05-23 PROCEDURE — 40000048 ZZH STATISTIC DAILY SWAN MONITORING

## 2018-05-23 PROCEDURE — A9270 NON-COVERED ITEM OR SERVICE: HCPCS | Mod: GY | Performed by: STUDENT IN AN ORGANIZED HEALTH CARE EDUCATION/TRAINING PROGRAM

## 2018-05-23 PROCEDURE — 20000004 ZZH R&B ICU UMMC

## 2018-05-23 PROCEDURE — 25000132 ZZH RX MED GY IP 250 OP 250 PS 637: Performed by: STUDENT IN AN ORGANIZED HEALTH CARE EDUCATION/TRAINING PROGRAM

## 2018-05-23 PROCEDURE — 40000196 ZZH STATISTIC RAPCV CVP MONITORING

## 2018-05-23 PROCEDURE — 85027 COMPLETE CBC AUTOMATED: CPT | Performed by: INTERNAL MEDICINE

## 2018-05-23 PROCEDURE — 25000132 ZZH RX MED GY IP 250 OP 250 PS 637: Performed by: NURSE PRACTITIONER

## 2018-05-23 PROCEDURE — 40000133 ZZH STATISTIC OT WARD VISIT: Performed by: OCCUPATIONAL THERAPIST

## 2018-05-23 PROCEDURE — 83735 ASSAY OF MAGNESIUM: CPT | Performed by: INTERNAL MEDICINE

## 2018-05-23 PROCEDURE — 25000132 ZZH RX MED GY IP 250 OP 250 PS 637: Mod: GY | Performed by: INTERNAL MEDICINE

## 2018-05-23 PROCEDURE — A9270 NON-COVERED ITEM OR SERVICE: HCPCS | Mod: GY | Performed by: INTERNAL MEDICINE

## 2018-05-23 PROCEDURE — 97535 SELF CARE MNGMENT TRAINING: CPT | Mod: GO | Performed by: OCCUPATIONAL THERAPIST

## 2018-05-23 PROCEDURE — 00000146 ZZHCL STATISTIC GLUCOSE BY METER IP

## 2018-05-23 PROCEDURE — 25000125 ZZHC RX 250: Performed by: NURSE PRACTITIONER

## 2018-05-23 PROCEDURE — 99232 SBSQ HOSP IP/OBS MODERATE 35: CPT | Mod: GC | Performed by: INTERNAL MEDICINE

## 2018-05-23 RX ORDER — HYDRALAZINE HYDROCHLORIDE 10 MG/1
10 TABLET, FILM COATED ORAL
Status: DISCONTINUED | OUTPATIENT
Start: 2018-05-24 | End: 2018-05-27

## 2018-05-23 RX ORDER — PREDNISONE 20 MG/1
20 TABLET ORAL DAILY
Status: DISCONTINUED | OUTPATIENT
Start: 2018-05-23 | End: 2018-05-25

## 2018-05-23 RX ADMIN — PREDNISONE 20 MG: 20 TABLET ORAL at 10:42

## 2018-05-23 RX ADMIN — INSULIN GLARGINE 10 UNITS: 100 INJECTION, SOLUTION SUBCUTANEOUS at 08:30

## 2018-05-23 RX ADMIN — Medication 1 CAPSULE: at 08:29

## 2018-05-23 RX ADMIN — Medication 2 G: at 08:25

## 2018-05-23 RX ADMIN — SENNOSIDES AND DOCUSATE SODIUM 1 TABLET: 8.6; 5 TABLET ORAL at 08:29

## 2018-05-23 RX ADMIN — INSULIN ASPART 7 UNITS: 100 INJECTION, SOLUTION INTRAVENOUS; SUBCUTANEOUS at 13:57

## 2018-05-23 RX ADMIN — Medication 12.5 MG: at 22:33

## 2018-05-23 RX ADMIN — CALCIUM ACETATE 667 MG: 667 CAPSULE ORAL at 12:16

## 2018-05-23 RX ADMIN — ASPIRIN 81 MG CHEWABLE TABLET 81 MG: 81 TABLET CHEWABLE at 20:04

## 2018-05-23 RX ADMIN — FLUTICASONE PROPIONATE 2 SPRAY: 50 SPRAY, METERED NASAL at 20:05

## 2018-05-23 RX ADMIN — Medication 12.5 MG: at 18:06

## 2018-05-23 RX ADMIN — OMEPRAZOLE 20 MG: 20 CAPSULE, DELAYED RELEASE ORAL at 20:04

## 2018-05-23 RX ADMIN — CALCIUM ACETATE 667 MG: 667 CAPSULE ORAL at 08:29

## 2018-05-23 RX ADMIN — Medication 12.5 MG: at 08:29

## 2018-05-23 RX ADMIN — INSULIN ASPART 5 UNITS: 100 INJECTION, SOLUTION INTRAVENOUS; SUBCUTANEOUS at 18:07

## 2018-05-23 RX ADMIN — SENNOSIDES AND DOCUSATE SODIUM 1 TABLET: 8.6; 5 TABLET ORAL at 20:04

## 2018-05-23 RX ADMIN — ALLOPURINOL 100 MG: 100 TABLET ORAL at 08:29

## 2018-05-23 RX ADMIN — VITAMIN B12 0.1 MG ORAL TABLET 100 MCG: 0.1 TABLET ORAL at 08:29

## 2018-05-23 RX ADMIN — INSULIN ASPART 3 UNITS: 100 INJECTION, SOLUTION INTRAVENOUS; SUBCUTANEOUS at 12:22

## 2018-05-23 RX ADMIN — CETIRIZINE HYDROCHLORIDE 10 MG: 10 TABLET, FILM COATED ORAL at 08:29

## 2018-05-23 RX ADMIN — Medication 12.5 MG: at 12:17

## 2018-05-23 RX ADMIN — ATORVASTATIN CALCIUM 40 MG: 40 TABLET, FILM COATED ORAL at 20:04

## 2018-05-23 RX ADMIN — CALCIUM ACETATE 667 MG: 667 CAPSULE ORAL at 18:00

## 2018-05-23 RX ADMIN — Medication 100 MG: at 08:29

## 2018-05-23 NOTE — PLAN OF CARE
Problem: Patient Care Overview  Goal: Plan of Care/Patient Progress Review  Outcome: No Change  Up independently. Ambulated in [place]. C/O gout pain in feet. MD started 20 mg prednisone daily. Magnesium replaced. 1800 BECKA: CO 4.5; CI 2.3; SVR 1242.7

## 2018-05-23 NOTE — PLAN OF CARE
Problem: Cardiac: Heart Failure (Adult)  Goal: Signs and Symptoms of Listed Potential Problems Will be Absent, Minimized or Managed (Cardiac: Heart Failure)  Signs and symptoms of listed potential problems will be absent, minimized or managed by discharge/transition of care (reference Cardiac: Heart Failure (Adult) CPG).   Outcome: No Change  Patient continues to have Dobutamine and Dopamine running. FICKs every 12 hours.

## 2018-05-23 NOTE — PROGRESS NOTES
"  Advanced Heart Failure and Transplant Cardiology  Progress Note:    Assessment and Plan:  63 year old male with a PMH of CAD, ICM (EF of 15-20%), ESRD, bicuspid aortic valve with AI, severe MR, and proximal AAA who was admitted for decompensated heart failure. He is on dobutamine 2.5 mcg/kg/min and listed for heart/kidney transplant as status 1A.    Major Changes:  - add prednisone for gout with taper once resolution emerges    # Stage D NYHA Class III systolic heart failure secondary to ICM, listed 1A  # Severe mitral regurgitation  # Severe holodiastolic aortic insufficiency   - continue dobutamine 2.5 mcg/kg/min, dopamine 2.5 mcg/kg/min  Sleep apnea: yes, unable to tolerate CPAP  - hydralazine 25 mg q6 hours on non-dialysis days + 10 mg BID on dialysis days (after HD)  - weekly CXR (today, then 5/28)    # Gout:  - prednisone burst 20 mg daily (for now), cont allopurinol       # Chronic Medical Issues:  - Seborrheic keratoses / Dermatofibromas / Multiple benign nevi / Nummular dermatitis: derm consulted, all lesions benign  - Hx of allergic rhinitis: cetrizine  - ESRD: Dialysis T, Th, Sa   - Non-obstructive CAD, Anomalous RCA: ASA 81 mg, atorvastatin 40 mg daily  - Ascending aortic aneurysm: 4.5 x 4.5 cm proximal ascending aortic aneurysm seen on MRI 2/14  - Multiple benign branch duct-IPMNs: no concerning features affecting transplant candidacy      FEN: regular diet, 2L FR  PROPHY: ambulation  LINES: PICC  DISPO: TBD  CODE STATUS:  Full    Seen and discussed with Dr. Rei Mejía, CVD Fellow    ================================================================    Subjective/24-Hr Events:   - no acute issues overnight    ROS:  4 point ROS including respiratory, CV, GI and  (other than that noted in the HPI) is negative.     Medications: Reviewed in EPIC.     Physical Exam:   /66 (BP Location: Right arm)  Pulse 107  Temp 98.2  F (36.8  C) (Oral)  Resp 16  Ht 1.727 m (5' 8\")  Wt 76.9 kg (169 lb " 8.5 oz)  SpO2 100%  BMI 25.78 kg/m2    GENERAL: A&O x 3, NAD  NECK: no JVD, L-sided [place] in place bandaged and c/d/i  CV: Mildly tachycardic, +S1S2, soft murmur across precordium  RESPIRATORY: CTAB  GI: soft, NT, ND  EXTREMITIES: No peripheral edema. 2+ bilateral pedal pulses. Warm.   NEUROLOGIC: Alert and oriented x 3. No focal deficits.   SKIN: No jaundice. No rashes or lesions.     Labs / Imaging:  - all labs and imaging reviewed

## 2018-05-23 NOTE — PROGRESS NOTES
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    Navid Pearl) Pia Alonso M.Div., [facility]  Staff   Pager [phone]

## 2018-05-23 NOTE — PLAN OF CARE
Problem: Patient Care Overview  Goal: Plan of Care/Patient Progress Review  OT/CR 4E  Discharge Planner OT   Patient plan for discharge: pt hopeful to return home pending outcome of anticipated heart/kidney transplant  Current status: SBA supine to EOB. SBA sit<>stand x5 reps throughout session. SBA performing g/h tasks at sink. Pt ambulated for ~15 minutes with SBA and intermittent standing rest breaks. Pt completed 4 UE and LE dynamic exercises x10 reps with seated rest breaks between exercises.  Barriers to return to prior living situation: acute medical needs-awaiting transplant  Recommendations for discharge: Current recommendation is to return home with OP CR  Rationale for recommendations: Pt is currently safe and independent with basic self cares and mobility - will update recommendation post transplant.       Entered by: Annmarie JOHNSON Retterath 05/23/2018 1:12 PM

## 2018-05-23 NOTE — PROGRESS NOTES
Problem: Cardiac: Heart Failure (Adult)  Goal: Signs and Symptoms of Listed Potential Problems Will be Absent, Minimized or Managed (Cardiac: Heart Failure)  Signs and symptoms of listed potential problems will be absent, minimized or managed by discharge/transition of care (reference Cardiac: Heart Failure (Adult) CPG).   Outcome: No Change    Denies pain, VSS. CVP 6, PA 46/34. Satting >92% on RA. Remained Anuric.   Pt on Dopamine & Dobutamine.  Continue to monitor and intervene as indicated.

## 2018-05-24 LAB
ANION GAP SERPL CALCULATED.3IONS-SCNC: 7 MMOL/L (ref 3–14)
BASE EXCESS BLDV CALC-SCNC: 7.7 MMOL/L
BASE EXCESS BLDV CALC-SCNC: 8.3 MMOL/L
BUN SERPL-MCNC: 38 MG/DL (ref 7–30)
CA-I BLD-MCNC: 5.3 MG/DL (ref 4.4–5.2)
CALCIUM SERPL-MCNC: 9.5 MG/DL (ref 8.5–10.1)
CHLORIDE SERPL-SCNC: 97 MMOL/L (ref 94–109)
CO2 SERPL-SCNC: 30 MMOL/L (ref 20–32)
CREAT SERPL-MCNC: 6.82 MG/DL (ref 0.66–1.25)
ERYTHROCYTE [DISTWIDTH] IN BLOOD BY AUTOMATED COUNT: 16.4 % (ref 10–15)
GFR SERPL CREATININE-BSD FRML MDRD: 8 ML/MIN/1.7M2
GLUCOSE BLDC GLUCOMTR-MCNC: 132 MG/DL (ref 70–99)
GLUCOSE BLDC GLUCOMTR-MCNC: 162 MG/DL (ref 70–99)
GLUCOSE BLDC GLUCOMTR-MCNC: 163 MG/DL (ref 70–99)
GLUCOSE BLDC GLUCOMTR-MCNC: 304 MG/DL (ref 70–99)
GLUCOSE SERPL-MCNC: 171 MG/DL (ref 70–99)
HCO3 BLDV-SCNC: 32 MMOL/L (ref 21–28)
HCO3 BLDV-SCNC: 32 MMOL/L (ref 21–28)
HCT VFR BLD AUTO: 28.4 % (ref 40–53)
HGB BLD-MCNC: 8.9 G/DL (ref 13.3–17.7)
MAGNESIUM SERPL-MCNC: 2.1 MG/DL (ref 1.6–2.3)
MCH RBC QN AUTO: 29.3 PG (ref 26.5–33)
MCHC RBC AUTO-ENTMCNC: 31.3 G/DL (ref 31.5–36.5)
MCV RBC AUTO: 93 FL (ref 78–100)
O2/TOTAL GAS SETTING VFR VENT: 21 %
O2/TOTAL GAS SETTING VFR VENT: 21 %
OXYHGB MFR BLDV: 52 %
OXYHGB MFR BLDV: 56 %
PCO2 BLDV: 41 MM HG (ref 40–50)
PCO2 BLDV: 41 MM HG (ref 40–50)
PH BLDV: 7.5 PH (ref 7.32–7.43)
PH BLDV: 7.5 PH (ref 7.32–7.43)
PHOSPHATE SERPL-MCNC: 2.1 MG/DL (ref 2.5–4.5)
PLATELET # BLD AUTO: 223 10E9/L (ref 150–450)
PO2 BLDV: 29 MM HG (ref 25–47)
PO2 BLDV: 32 MM HG (ref 25–47)
POTASSIUM SERPL-SCNC: 4.6 MMOL/L (ref 3.4–5.3)
RBC # BLD AUTO: 3.04 10E12/L (ref 4.4–5.9)
SODIUM SERPL-SCNC: 135 MMOL/L (ref 133–144)
WBC # BLD AUTO: 6.2 10E9/L (ref 4–11)

## 2018-05-24 PROCEDURE — 25000132 ZZH RX MED GY IP 250 OP 250 PS 637: Mod: GY | Performed by: STUDENT IN AN ORGANIZED HEALTH CARE EDUCATION/TRAINING PROGRAM

## 2018-05-24 PROCEDURE — 85027 COMPLETE CBC AUTOMATED: CPT | Performed by: NURSE PRACTITIONER

## 2018-05-24 PROCEDURE — A9270 NON-COVERED ITEM OR SERVICE: HCPCS | Mod: GY | Performed by: STUDENT IN AN ORGANIZED HEALTH CARE EDUCATION/TRAINING PROGRAM

## 2018-05-24 PROCEDURE — 25000132 ZZH RX MED GY IP 250 OP 250 PS 637: Mod: GY | Performed by: NURSE PRACTITIONER

## 2018-05-24 PROCEDURE — 94640 AIRWAY INHALATION TREATMENT: CPT

## 2018-05-24 PROCEDURE — 94640 AIRWAY INHALATION TREATMENT: CPT | Mod: 76

## 2018-05-24 PROCEDURE — 40000196 ZZH STATISTIC RAPCV CVP MONITORING

## 2018-05-24 PROCEDURE — 40000048 ZZH STATISTIC DAILY SWAN MONITORING

## 2018-05-24 PROCEDURE — 25000128 H RX IP 250 OP 636: Performed by: INTERNAL MEDICINE

## 2018-05-24 PROCEDURE — 25000125 ZZHC RX 250: Performed by: STUDENT IN AN ORGANIZED HEALTH CARE EDUCATION/TRAINING PROGRAM

## 2018-05-24 PROCEDURE — 82330 ASSAY OF CALCIUM: CPT | Performed by: INTERNAL MEDICINE

## 2018-05-24 PROCEDURE — 83735 ASSAY OF MAGNESIUM: CPT | Performed by: NURSE PRACTITIONER

## 2018-05-24 PROCEDURE — 82805 BLOOD GASES W/O2 SATURATION: CPT | Performed by: INTERNAL MEDICINE

## 2018-05-24 PROCEDURE — 63400005 ZZH RX 634: Performed by: INTERNAL MEDICINE

## 2018-05-24 PROCEDURE — A9270 NON-COVERED ITEM OR SERVICE: HCPCS | Mod: GY | Performed by: NURSE PRACTITIONER

## 2018-05-24 PROCEDURE — 80048 BASIC METABOLIC PNL TOTAL CA: CPT | Performed by: NURSE PRACTITIONER

## 2018-05-24 PROCEDURE — 40000275 ZZH STATISTIC RCP TIME EA 10 MIN

## 2018-05-24 PROCEDURE — 00000146 ZZHCL STATISTIC GLUCOSE BY METER IP

## 2018-05-24 PROCEDURE — 25000132 ZZH RX MED GY IP 250 OP 250 PS 637: Performed by: NURSE PRACTITIONER

## 2018-05-24 PROCEDURE — 84100 ASSAY OF PHOSPHORUS: CPT | Performed by: NURSE PRACTITIONER

## 2018-05-24 PROCEDURE — 99232 SBSQ HOSP IP/OBS MODERATE 35: CPT | Mod: GC | Performed by: INTERNAL MEDICINE

## 2018-05-24 PROCEDURE — 25000128 H RX IP 250 OP 636: Performed by: STUDENT IN AN ORGANIZED HEALTH CARE EDUCATION/TRAINING PROGRAM

## 2018-05-24 PROCEDURE — 20000004 ZZH R&B ICU UMMC

## 2018-05-24 PROCEDURE — 90937 HEMODIALYSIS REPEATED EVAL: CPT

## 2018-05-24 RX ORDER — HEPARIN SODIUM 1000 [USP'U]/ML
500 INJECTION, SOLUTION INTRAVENOUS; SUBCUTANEOUS CONTINUOUS
Status: DISCONTINUED | OUTPATIENT
Start: 2018-05-24 | End: 2018-05-24

## 2018-05-24 RX ORDER — HEPARIN SODIUM 1000 [USP'U]/ML
500 INJECTION, SOLUTION INTRAVENOUS; SUBCUTANEOUS
Status: COMPLETED | OUTPATIENT
Start: 2018-05-24 | End: 2018-05-24

## 2018-05-24 RX ADMIN — VITAMIN B12 0.1 MG ORAL TABLET 100 MCG: 0.1 TABLET ORAL at 08:28

## 2018-05-24 RX ADMIN — INSULIN ASPART 8 UNITS: 100 INJECTION, SOLUTION INTRAVENOUS; SUBCUTANEOUS at 15:40

## 2018-05-24 RX ADMIN — DOPAMINE HYDROCHLORIDE IN DEXTROSE 2.5 MCG/KG/MIN: 1.6 INJECTION, SOLUTION INTRAVENOUS at 01:22

## 2018-05-24 RX ADMIN — FLUTICASONE PROPIONATE 2 SPRAY: 50 SPRAY, METERED NASAL at 21:11

## 2018-05-24 RX ADMIN — PREDNISONE 20 MG: 20 TABLET ORAL at 08:28

## 2018-05-24 RX ADMIN — ALBUTEROL SULFATE 2.5 MG: 2.5 SOLUTION RESPIRATORY (INHALATION) at 23:01

## 2018-05-24 RX ADMIN — SODIUM CHLORIDE 300 ML: 9 INJECTION, SOLUTION INTRAVENOUS at 11:17

## 2018-05-24 RX ADMIN — HEPARIN SODIUM 2100 UNITS: 1000 INJECTION, SOLUTION INTRAVENOUS; SUBCUTANEOUS at 11:19

## 2018-05-24 RX ADMIN — EPOETIN ALFA 4000 UNITS: 10000 SOLUTION INTRAVENOUS; SUBCUTANEOUS at 11:18

## 2018-05-24 RX ADMIN — INSULIN GLARGINE 10 UNITS: 100 INJECTION, SOLUTION SUBCUTANEOUS at 07:36

## 2018-05-24 RX ADMIN — CETIRIZINE HYDROCHLORIDE 10 MG: 10 TABLET, FILM COATED ORAL at 08:28

## 2018-05-24 RX ADMIN — OMEPRAZOLE 20 MG: 20 CAPSULE, DELAYED RELEASE ORAL at 21:17

## 2018-05-24 RX ADMIN — ATORVASTATIN CALCIUM 40 MG: 40 TABLET, FILM COATED ORAL at 21:16

## 2018-05-24 RX ADMIN — HEPARIN SODIUM 500 UNITS: 1000 INJECTION, SOLUTION INTRAVENOUS; SUBCUTANEOUS at 11:18

## 2018-05-24 RX ADMIN — CALCIUM ACETATE 667 MG: 667 CAPSULE ORAL at 08:29

## 2018-05-24 RX ADMIN — HYDRALAZINE HYDROCHLORIDE 10 MG: 10 TABLET, FILM COATED ORAL at 15:45

## 2018-05-24 RX ADMIN — HEPARIN SODIUM 500 UNITS/HR: 1000 INJECTION, SOLUTION INTRAVENOUS; SUBCUTANEOUS at 11:19

## 2018-05-24 RX ADMIN — INSULIN ASPART 7 UNITS: 100 INJECTION, SOLUTION INTRAVENOUS; SUBCUTANEOUS at 19:24

## 2018-05-24 RX ADMIN — Medication 100 MG: at 08:28

## 2018-05-24 RX ADMIN — ALBUTEROL SULFATE 2.5 MG: 2.5 SOLUTION RESPIRATORY (INHALATION) at 01:18

## 2018-05-24 RX ADMIN — HYDRALAZINE HYDROCHLORIDE 10 MG: 10 TABLET, FILM COATED ORAL at 21:17

## 2018-05-24 RX ADMIN — ASPIRIN 81 MG CHEWABLE TABLET 81 MG: 81 TABLET CHEWABLE at 21:17

## 2018-05-24 RX ADMIN — ALLOPURINOL 100 MG: 100 TABLET ORAL at 08:28

## 2018-05-24 RX ADMIN — SODIUM CHLORIDE 250 ML: 9 INJECTION, SOLUTION INTRAVENOUS at 11:17

## 2018-05-24 RX ADMIN — DOBUTAMINE HYDROCHLORIDE 2.5 MCG/KG/MIN: 400 INJECTION INTRAVENOUS at 16:33

## 2018-05-24 NOTE — PROGRESS NOTES
"  Advanced Heart Failure and Transplant Cardiology  Progress Note:    Assessment and Plan:  63 year old male with a PMH of CAD, ICM (EF of 15-20%), ESRD, bicuspid aortic valve with AI, severe MR, and proximal AAA who was admitted for decompensated heart failure. He is on dobutamine 2.5 mcg/kg/min and listed for heart/kidney transplant as status 1A.    Major Changes:  - continue prednisone    # Stage D NYHA Class III systolic heart failure secondary to ICM, listed 1A  # Severe mitral regurgitation  # Severe holodiastolic aortic insufficiency   - continue dobutamine 2.5 mcg/kg/min, dopamine 2.5 mcg/kg/min  Sleep apnea: yes, unable to tolerate CPAP  - hydralazine 12.5 mg q6 hours on non-dialysis days + 10 mg BID on dialysis days (after HD)  - weekly CXR (today, then 5/28)    # Gout:  - prednisone burst 20 mg daily x 3 days (through 5/25), cont allopurinol       # Chronic Medical Issues:  - Seborrheic keratoses / Dermatofibromas / Multiple benign nevi / Nummular dermatitis: derm consulted, all lesions benign  - Hx of allergic rhinitis: cetrizine  - ESRD: Dialysis T, Th, Sa   - Non-obstructive CAD, Anomalous RCA: ASA 81 mg, atorvastatin 40 mg daily  - Ascending aortic aneurysm: 4.5 x 4.5 cm proximal ascending aortic aneurysm seen on MRI 2/14  - Multiple benign branch duct-IPMNs: no concerning features affecting transplant candidacy      FEN: regular diet, 2L FR  PROPHY: ambulation  LINES: PICC  DISPO: TBD  CODE STATUS:  Full    Seen and discussed with Dr. Ernst.  Sukumar Mejía, CVD Fellow    ================================================================    Subjective/24-Hr Events:   - no acute issues overnight    ROS:  4 point ROS including respiratory, CV, GI and  (other than that noted in the HPI) is negative.     Medications: Reviewed in EPIC.     Physical Exam:   /74 (BP Location: Right arm)  Pulse 107  Temp 98.6  F (37  C) (Oral)  Resp 17  Ht 1.727 m (5' 8\")  Wt 78.8 kg (173 lb 11.6 oz)  SpO2 100% "  BMI 26.41 kg/m2    GENERAL: A&O x 3, NAD  NECK: no JVD, L-sided [place] in place bandaged and c/d/i  CV: Mildly tachycardic, +S1S2, soft murmur across precordium  RESPIRATORY: CTAB  GI: soft, NT, ND  EXTREMITIES: No peripheral edema. 2+ bilateral pedal pulses. Warm.   NEUROLOGIC: Alert and oriented x 3. No focal deficits.   SKIN: No jaundice. No rashes or lesions.     Labs / Imaging:  - all labs and imaging reviewed

## 2018-05-24 NOTE — PROGRESS NOTES
"SPIRITUAL HEALTH SERVICES  SPIRITUAL ASSESSMENT Progress Note  [facility] ([place]) 4E     Visited with pt and \"best friend\" Naresh. 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 Also talked about their common love of music.    PLAN: I will be out of hospital until next Wednesday; on-call  support always available if requested.    Navid Pearl) Pia Alonso M.Div., [facility]  Staff   Pager [phone]      "

## 2018-05-24 NOTE — PROGRESS NOTES
"  Nephrology Progress Note  05/24/2018         Assessment & Recommendations:   Murray Nicholson is a 63 yr old male with PMH of HTN, DMII, functionally bicuspid aortic valve, CHF/CM (EF 10-15%), ESRD, admitted with worsening dyspnea/SOB, now on dobutamine drip and being evaluated for heart/kidney transplant.      ESKD: due to DMII, on PD since Aug 2017, changed to iHD 1/2018 due to polymicrobial and fungal peritonitis, now on TTS schedule at [facility] under care of Dr Mcpherson. Access: tunneled RIJ (replaced 4/17/18). Run time: 4 hrs; EDW 76 kg.  - Dialysis per TTS schedule   - Using low dose heparin on HD  - Heparin lock CVC        Volume: EDW 76 kg. RHC 5/1 (done at 77 kg): RA 3, PCW 15; CVP 7   - Much prefer standing weights if possible (appreciate ICU obtaining pt's own scale!)      Severe AV and MR insufficiency:   BP/congestive CM (EF 10-15%); minimal CAD per angio on 2/8/17. Chronically hypotensive with tachycardia  - Goal MAP > 65 and SBP > 95 while dialyzing  - On dobutamine 2.5 mcg/kg/min (started 4/20/18)  - Listed for heart/kidney tx, in ICU on dobutamine and dopamine         Anemia: hgb 8.9, labs 5/8: ferritin 621, IS 27, iron 58; on maintenance venofer 50 mg qTuesday   - Continue venofer qTues  - On epogen 4000 units per HD      BMD: iCa 5.3, phos 2.1; recent ; Vit D 27; on hectorol 0.5 mcg via HD; on phoslo 1 tab TID with meals.   - Stopped hectorol  - Stopped Phoslo      Recommendations were communicated to primary team via this note.       Kristi Armas PADariuszC  Division of Kidney Disease  [phone]    Interval History :   Doing well with no interval events. Remains in ICU in DB drip.  Denies CP, SOB, chills, n/v.    Review of Systems:   4 point ROS negative other than as noted above.    Physical Exam:   I/O last 3 completed shifts:  In: 802.8 [P.O.:480; I.V.:322.8]  Out: -    /72  Pulse 107  Temp 98.3  F (36.8  C) (Oral)  Resp 22  Ht 1.727 m (5' 8\")  Wt 78.8 kg (173 lb 11.6 oz) "  SpO2 100%  BMI 26.41 kg/m2     GENERAL APPEARANCE: alert, NAD  EYES: no scleral icterus, pupils equal   Pulmonary: lungs clear to auscultation with equal breath sounds bilaterally; has swan  CV: regular rhythm, tachycardia at baseline   - Edema trace  GI: soft, nontender, normal bowel sounds  MS: no evidence of inflammation in joints, no muscle tenderness  : no doyle  SKIN: no rash, warm, dry, no cyanosis  NEURO: face symmetric, no asterixis   Access: tunneled [facility]       Labs:   All labs reviewed by me  Electrolytes/Renal -   Recent Labs   Lab Test  05/24/18   0331  05/23/18   0534  05/22/18   0531   05/15/18   0402   NA  135  139  138   < >  135   POTASSIUM  4.6  3.8  5.0   < >  4.6   CHLORIDE  97  101  102   < >  98   CO2  30  30  28   < >  31   BUN  38*  21  40*   < >  34*   CR  6.82*  5.19*  8.35*   < >  8.18*   GLC  171*  113*  104*   < >  88   TRINI  9.5  8.9  10.2*   < >  9.8   MAG  2.1  1.7  2.0   < >  2.0   PHOS  2.1*   --   2.8   --   3.6    < > = values in this interval not displayed.       CBC -   Recent Labs   Lab Test  05/24/18   0331 05/23/18   0534  05/22/18   0531   WBC  6.2  5.0  5.5   HGB  8.9*  8.9*  8.8*   PLT  223  225  212       LFTs -   Recent Labs   Lab Test  04/16/18   1546  03/07/18   0833  02/19/18   1558  02/02/18   1736   ALKPHOS  123  129  102  86   BILITOTAL  0.8  0.7  0.6  0.4   ALT  22  21  15  16   AST  17  18  18  20   PROTTOTAL  7.4   --   7.2  6.2*   ALBUMIN  3.5   --   2.7*  2.1*       Iron Panel -   Recent Labs   Lab Test  05/08/18   0954  07/19/17   1306  07/05/17   1204   IRON  58  46  26*   IRONSAT  27  18  12*   ALBERTINA  621*  369  542*         Imaging:  Reviewed    Current Medications:    albuterol  2.5 mg Nebulization At Bedtime     allopurinol  100 mg Oral Daily     aspirin  81 mg Oral Daily     atorvastatin  40 mg Oral Daily     calcium acetate  667 mg Oral TID w/meals     cetirizine  10 mg Oral Daily     cyanocolbalamin  100 mcg Oral Daily     fluticasone  1-2 spray  Both Nostrils Daily     hydrALAZINE  12.5 mg Oral 4 times per day on Sun Mon Wed Fri    And     hydrALAZINE  10 mg Oral 2 times per day on Tue Thu Sat     insulin aspart   Subcutaneous QAM AC     insulin aspart   Subcutaneous Daily with lunch     insulin aspart   Subcutaneous Daily with supper     insulin aspart  1-10 Units Subcutaneous TID AC     insulin aspart  1-7 Units Subcutaneous At Bedtime     insulin glargine  10 Units Subcutaneous QAM AC     NEPHROCAPS  1 capsule Oral Daily     omeprazole  20 mg Oral Daily     polyethylene glycol  17 g Oral Daily     predniSONE  20 mg Oral Daily     senna-docusate  1 tablet Oral BID     sodium chloride (PF)  10 mL Intracatheter Q7 Days     sodium chloride (PF)  3 mL Intracatheter During Hemodialysis (from stock)     sodium chloride (PF)  3 mL Intracatheter Q8H     vitamin  B-1  100 mg Oral Daily       DOBUTamine 2.5 mcg/kg/min (05/24/18 1100)     DOPamine 2.5 mcg/kg/min (05/24/18 1100)     - MEDICATION INSTRUCTIONS -       Reason ACE/ARB/ARNI order not selected       Reason beta blocker order not selected       Kristi Armas PA-C

## 2018-05-24 NOTE — PLAN OF CARE
Problem: Patient Care Overview  Goal: Plan of Care/Patient Progress Review  Outcome: No Change  D: Afebrile. Sinus tachycardia. Normotensive. A&Ox4. On RA.  I/A: [place] q 12 hours. CVP 15, PA 58/38, CO 4.1, CI 2.2, SVR 1569, SVO2 52. Denies SOB. 1 BM this morning. Continues on dopamine and dobutamine gtts at 2.5.   P: Continue to monitor and assess pt. Consult Cards 2 team with any new changes/concerns.

## 2018-05-24 NOTE — PROGRESS NOTES
HEMODIALYSIS TREATMENT NOTE    Date: 5/24/2018  Time: 1430    Data:  Pre Wt: 78.8 kg  Desired Wt: 76 kg  Ultrafiltration - Post Run Net Total Removed (mL): 2800 mL  Ultrafiltration - Post Run Net Total Gain (mL): 0 mL  Vascular Access Status: Yes, secured and visible  Dialyzer Rinse; light  Total Blood Volume Processed: 61.1 L  Total Dialysis (Treatment) Time:  3.5 hours    Assessment/Interventions: 3.5 hour HD run.  Blood flow decreased 2/2 collapsing.   Blood flow 300 mL/min.  2.8 kg removed.  Tolerated well.  Pt given Epogen during run.  2.1 mL Heparin instilled into each line of dialysis catheter and catheter marked.  Report given.       Plan:  Continue with plan of care.  Next run per Renal Team,.

## 2018-05-24 NOTE — PLAN OF CARE
Problem: Patient Care Overview  Goal: Plan of Care/Patient Progress Review  Neuro: A&Ox4.   Cardiac: SR. VSS. CVP now at 3 after HD; MAP WDL.   Respiratory: Sating WDL on RA.  GI/: Pt had a run of HD today. BM X2 this shift.   Diet/appetite: Tolerating regular diet. Eating well.  Activity: Independent up to chair and in and ambulated in room. Fatigue and SOB noted with minimal to moderate activity.  Pain: At acceptable level on current regimen.   Skin: No new deficits noted.  LDA's: SWAN and CVP lines both intact.     Plan: Continue with POC. Notify primary team with changes.

## 2018-05-25 ENCOUNTER — APPOINTMENT (OUTPATIENT)
Dept: OCCUPATIONAL THERAPY | Facility: CLINIC | Age: 63
DRG: 001 | End: 2018-05-25
Attending: INTERNAL MEDICINE
Payer: COMMERCIAL

## 2018-05-25 LAB
ANION GAP SERPL CALCULATED.3IONS-SCNC: 8 MMOL/L (ref 3–14)
BASE DEFICIT BLDV-SCNC: 0.6 MMOL/L
BASE EXCESS BLDV CALC-SCNC: 11.3 MMOL/L
BUN SERPL-MCNC: 28 MG/DL (ref 7–30)
CALCIUM SERPL-MCNC: 9.5 MG/DL (ref 8.5–10.1)
CHLORIDE SERPL-SCNC: 97 MMOL/L (ref 94–109)
CO2 SERPL-SCNC: 33 MMOL/L (ref 20–32)
CREAT SERPL-MCNC: 5.41 MG/DL (ref 0.66–1.25)
ERYTHROCYTE [DISTWIDTH] IN BLOOD BY AUTOMATED COUNT: 16.6 % (ref 10–15)
GFR SERPL CREATININE-BSD FRML MDRD: 11 ML/MIN/1.7M2
GLUCOSE BLDC GLUCOMTR-MCNC: 110 MG/DL (ref 70–99)
GLUCOSE BLDC GLUCOMTR-MCNC: 131 MG/DL (ref 70–99)
GLUCOSE BLDC GLUCOMTR-MCNC: 258 MG/DL (ref 70–99)
GLUCOSE BLDC GLUCOMTR-MCNC: 329 MG/DL (ref 70–99)
GLUCOSE SERPL-MCNC: 98 MG/DL (ref 70–99)
HCO3 BLDV-SCNC: 23 MMOL/L (ref 21–28)
HCO3 BLDV-SCNC: 35 MMOL/L (ref 21–28)
HCT VFR BLD AUTO: 27.9 % (ref 40–53)
HGB BLD-MCNC: 8.9 G/DL (ref 13.3–17.7)
LACTATE BLD-SCNC: 1 MMOL/L (ref 0.7–2)
MAGNESIUM SERPL-MCNC: 1.8 MG/DL (ref 1.6–2.3)
MAGNESIUM SERPL-MCNC: 2 MG/DL (ref 1.6–2.3)
MCH RBC QN AUTO: 29.8 PG (ref 26.5–33)
MCHC RBC AUTO-ENTMCNC: 31.9 G/DL (ref 31.5–36.5)
MCV RBC AUTO: 93 FL (ref 78–100)
O2/TOTAL GAS SETTING VFR VENT: 21 %
O2/TOTAL GAS SETTING VFR VENT: ABNORMAL %
OXYHGB MFR BLDV: 46 %
OXYHGB MFR BLDV: 51 %
PCO2 BLDV: 32 MM HG (ref 40–50)
PCO2 BLDV: 44 MM HG (ref 40–50)
PH BLDV: 7.47 PH (ref 7.32–7.43)
PH BLDV: 7.51 PH (ref 7.32–7.43)
PHOSPHATE SERPL-MCNC: 1.9 MG/DL (ref 2.5–4.5)
PHOSPHATE SERPL-MCNC: 2.3 MG/DL (ref 2.5–4.5)
PLATELET # BLD AUTO: 258 10E9/L (ref 150–450)
PO2 BLDV: 26 MM HG (ref 25–47)
PO2 BLDV: 30 MM HG (ref 25–47)
POTASSIUM SERPL-SCNC: 4 MMOL/L (ref 3.4–5.3)
POTASSIUM SERPL-SCNC: 4.2 MMOL/L (ref 3.4–5.3)
RBC # BLD AUTO: 2.99 10E12/L (ref 4.4–5.9)
SODIUM SERPL-SCNC: 138 MMOL/L (ref 133–144)
WBC # BLD AUTO: 8.8 10E9/L (ref 4–11)

## 2018-05-25 PROCEDURE — 00000146 ZZHCL STATISTIC GLUCOSE BY METER IP

## 2018-05-25 PROCEDURE — 25000132 ZZH RX MED GY IP 250 OP 250 PS 637: Mod: GY | Performed by: STUDENT IN AN ORGANIZED HEALTH CARE EDUCATION/TRAINING PROGRAM

## 2018-05-25 PROCEDURE — 83735 ASSAY OF MAGNESIUM: CPT | Performed by: INTERNAL MEDICINE

## 2018-05-25 PROCEDURE — 25000132 ZZH RX MED GY IP 250 OP 250 PS 637: Performed by: STUDENT IN AN ORGANIZED HEALTH CARE EDUCATION/TRAINING PROGRAM

## 2018-05-25 PROCEDURE — 82805 BLOOD GASES W/O2 SATURATION: CPT | Performed by: INTERNAL MEDICINE

## 2018-05-25 PROCEDURE — 25000125 ZZHC RX 250: Performed by: STUDENT IN AN ORGANIZED HEALTH CARE EDUCATION/TRAINING PROGRAM

## 2018-05-25 PROCEDURE — 94640 AIRWAY INHALATION TREATMENT: CPT

## 2018-05-25 PROCEDURE — 85027 COMPLETE CBC AUTOMATED: CPT | Performed by: INTERNAL MEDICINE

## 2018-05-25 PROCEDURE — 25000132 ZZH RX MED GY IP 250 OP 250 PS 637: Performed by: NURSE PRACTITIONER

## 2018-05-25 PROCEDURE — A9270 NON-COVERED ITEM OR SERVICE: HCPCS | Mod: GY | Performed by: NURSE PRACTITIONER

## 2018-05-25 PROCEDURE — A9270 NON-COVERED ITEM OR SERVICE: HCPCS | Mod: GY | Performed by: STUDENT IN AN ORGANIZED HEALTH CARE EDUCATION/TRAINING PROGRAM

## 2018-05-25 PROCEDURE — 97110 THERAPEUTIC EXERCISES: CPT | Mod: GO | Performed by: OCCUPATIONAL THERAPIST

## 2018-05-25 PROCEDURE — 80048 BASIC METABOLIC PNL TOTAL CA: CPT | Performed by: INTERNAL MEDICINE

## 2018-05-25 PROCEDURE — 25000125 ZZHC RX 250: Performed by: NURSE PRACTITIONER

## 2018-05-25 PROCEDURE — 99232 SBSQ HOSP IP/OBS MODERATE 35: CPT | Mod: GC | Performed by: INTERNAL MEDICINE

## 2018-05-25 PROCEDURE — 25000128 H RX IP 250 OP 636: Performed by: STUDENT IN AN ORGANIZED HEALTH CARE EDUCATION/TRAINING PROGRAM

## 2018-05-25 PROCEDURE — 83605 ASSAY OF LACTIC ACID: CPT | Performed by: INTERNAL MEDICINE

## 2018-05-25 PROCEDURE — 84100 ASSAY OF PHOSPHORUS: CPT | Performed by: INTERNAL MEDICINE

## 2018-05-25 PROCEDURE — 20000004 ZZH R&B ICU UMMC

## 2018-05-25 PROCEDURE — 84132 ASSAY OF SERUM POTASSIUM: CPT | Performed by: INTERNAL MEDICINE

## 2018-05-25 PROCEDURE — 40000275 ZZH STATISTIC RCP TIME EA 10 MIN

## 2018-05-25 PROCEDURE — 40000196 ZZH STATISTIC RAPCV CVP MONITORING

## 2018-05-25 PROCEDURE — 40000048 ZZH STATISTIC DAILY SWAN MONITORING

## 2018-05-25 PROCEDURE — 40000133 ZZH STATISTIC OT WARD VISIT: Performed by: OCCUPATIONAL THERAPIST

## 2018-05-25 RX ORDER — PREDNISONE 5 MG/1
5 TABLET ORAL DAILY
Status: COMPLETED | OUTPATIENT
Start: 2018-05-29 | End: 2018-05-31

## 2018-05-25 RX ORDER — PREDNISONE 10 MG/1
10 TABLET ORAL DAILY
Status: COMPLETED | OUTPATIENT
Start: 2018-05-26 | End: 2018-05-28

## 2018-05-25 RX ADMIN — INSULIN GLARGINE 10 UNITS: 100 INJECTION, SOLUTION SUBCUTANEOUS at 08:02

## 2018-05-25 RX ADMIN — Medication 12.5 MG: at 17:59

## 2018-05-25 RX ADMIN — Medication 100 MG: at 07:47

## 2018-05-25 RX ADMIN — ALBUTEROL SULFATE 2.5 MG: 2.5 SOLUTION RESPIRATORY (INHALATION) at 22:40

## 2018-05-25 RX ADMIN — INSULIN ASPART 8 UNITS: 100 INJECTION, SOLUTION INTRAVENOUS; SUBCUTANEOUS at 12:11

## 2018-05-25 RX ADMIN — ATORVASTATIN CALCIUM 40 MG: 40 TABLET, FILM COATED ORAL at 20:35

## 2018-05-25 RX ADMIN — DOPAMINE HYDROCHLORIDE IN DEXTROSE 2.5 MCG/KG/MIN: 1.6 INJECTION, SOLUTION INTRAVENOUS at 12:05

## 2018-05-25 RX ADMIN — Medication 12.5 MG: at 12:12

## 2018-05-25 RX ADMIN — INSULIN ASPART 6 UNITS: 100 INJECTION, SOLUTION INTRAVENOUS; SUBCUTANEOUS at 09:00

## 2018-05-25 RX ADMIN — Medication 1 CAPSULE: at 07:47

## 2018-05-25 RX ADMIN — Medication 12.5 MG: at 22:39

## 2018-05-25 RX ADMIN — Medication 12.5 MG: at 07:49

## 2018-05-25 RX ADMIN — PREDNISONE 20 MG: 20 TABLET ORAL at 07:46

## 2018-05-25 RX ADMIN — FLUTICASONE PROPIONATE 1 SPRAY: 50 SPRAY, METERED NASAL at 20:35

## 2018-05-25 RX ADMIN — DOPAMINE HYDROCHLORIDE IN DEXTROSE 2.5 MCG/KG/MIN: 1.6 INJECTION, SOLUTION INTRAVENOUS at 12:18

## 2018-05-25 RX ADMIN — VITAMIN B12 0.1 MG ORAL TABLET 100 MCG: 0.1 TABLET ORAL at 07:46

## 2018-05-25 RX ADMIN — CETIRIZINE HYDROCHLORIDE 10 MG: 10 TABLET, FILM COATED ORAL at 07:47

## 2018-05-25 RX ADMIN — ASPIRIN 81 MG CHEWABLE TABLET 81 MG: 81 TABLET CHEWABLE at 20:34

## 2018-05-25 RX ADMIN — ALLOPURINOL 100 MG: 100 TABLET ORAL at 07:47

## 2018-05-25 RX ADMIN — OMEPRAZOLE 20 MG: 20 CAPSULE, DELAYED RELEASE ORAL at 20:35

## 2018-05-25 RX ADMIN — Medication 2 G: at 07:46

## 2018-05-25 NOTE — PLAN OF CARE
Problem: Cardiac: Heart Failure (Adult)  Goal: Signs and Symptoms of Listed Potential Problems Will be Absent, Minimized or Managed (Cardiac: Heart Failure)  Signs and symptoms of listed potential problems will be absent, minimized or managed by discharge/transition of care (reference Cardiac: Heart Failure (Adult) CPG).   Outcome: No Change  No acute changes on day shift. Patient stable with swan, dobutamine and dopamine at straight rate of 2.5 each. No focal neuro deficits. No CV issues ex. Baseline tachycardia. Hemodynamics Q12H. Tolerating Room air. Anuric. HD. Tolerating regular diet. LBM 5/25. 2L FR. Ambulating with therapy stand by assist, independent in room with cares. Makes needs known. No concerns at this time.

## 2018-05-25 NOTE — PROGRESS NOTES
CLINICAL NUTRITION SERVICES - REASSESSMENT NOTE     Nutrition Prescription    RECOMMENDATIONS FOR MDs/PROVIDERS TO ORDER:  Continue liberalized diet as ordered    Malnutrition Status:    Non-severe malnutrition in the context of chronic illness    Recommendations already ordered by Registered Dietitian (RD):  No changes today    Future/Additional Recommendations:  If needs nutrition support (i.e. post-op if receives transplant), recommend either Nepro (if needs lower K+/phos formula) with eventual goal of 60 ml/hr (1440 ml/day) to provide 2592 kcals (35 kcal/kg/day), 117 g PRO (1.6 g/kg/day), 1051 ml free H2O, 232 g CHO and 18 g Fiber daily vs TwoCal HN (if wish standard K+/phos content) @ 55 mL/hr (1320 mL/day) to provide 2640 kcals (35 kcal/kg/day), 111 g PRO (1.5 g/kg/day), 924 mL H2O, 289 g CHO and 7 g Fiber daily.     EVALUATION OF THE PROGRESS TOWARD GOALS   Diet: Regular, 2000 mL fluid restriction, Nepro daily @ 2 pm  Intake: Meal intake most often documented as 100%, sometimes 75%. Appetite good, eating well per RN notes. Attempted to see pt today for visit but busy w/ other providers x 2 attempts. Per [employer], pt ordering ~8492-7414 kcals and 80-90 g PRO (100% of kcal and % of protein needs)      NEW FINDINGS   -Renal: remains on HD, K+ and phos controlled    MALNUTRITION  % Intake: No decreased intake noted  % Weight Loss: None noted, current wt 77.1 kg remains >lowest admit wt 74.7 kg on 4/21  Subcutaneous Fat Loss: Facial region and Lower arm: mild (per previous assessment)  Muscle Loss: Temporal, Thoracic region (clavicle, acromium bone, deltoid, trapezius, pectoral), Upper arm (bicep, tricep) and Lower arm  (forearm): moderate (per previous assessment)  Fluid Accumulation/Edema: None noted  Malnutrition Diagnosis: Non-severe malnutrition in the context of chronic illness    Previous Goals   Pt to consume at least 1 protein source at TID meals + 1 protein supplement daily.  Evaluation: Suspect  met per meal records    Previous Nutrition Diagnosis  Inadequate protein intake related to increased needs on HD, dislike of protein foods on the menu as evidenced by eating mainly CHO-containing foods    Evaluation: Improving    CURRENT NUTRITION DIAGNOSIS  Predicted inadequate nutrient intake (protein) related to increased needs on HD AEB historically had difficulties w/ menu options and eating enough protein foods    INTERVENTIONS  Implementation  Medical food supplement therapy - Continue Nepro     Goals  Pt to consume at least 1 protein source at TID meals + 1 protein supplement daily.    Monitoring/Evaluation  Progress toward goals will be monitored and evaluated per protocol.    Zunilda Deleon RD, LD, [facility]  CVICU Dietitian  Pager: [phone]

## 2018-05-25 NOTE — PLAN OF CARE
Problem: Patient Care Overview  Goal: Plan of Care/Patient Progress Review  Outcome: No Change  Nights:  OBINNA  Pt denies pain or discomfort.  CVP 58, PA 50/30's.  CI 2.2, SVO2 51%, SVR 1500's?  MAP 80's, , Lytes ok, Anuric. Dopamine @2.5, Dobutamine @2.5.    P:  Hemodynamic monitoring Q12 hours.

## 2018-05-25 NOTE — PLAN OF CARE
Problem: Patient Care Overview  Goal: Plan of Care/Patient Progress Review  OT/CR 4E:   ]Pt is safe and appropriate to ambulate with nursing staff with SBA assist.  To prevent deconditioning and promote safe discharge, recommend pt ambulate with nursing 1-2x/day as able. OT/CR to follow for skilled therapy 3x/week on M,W,F.    Discharge Planner OT   Patient plan for discharge: Pt is hopeful to be able to return to home pending outcome of anticipated heart/kidney transplant  Current status: Pt continues to be safe and independent with basic self cares and mobility.  Good participation in strength and aerobic conditioning exercise, ambulating approx 2000ft, completing repeated sit to stands with SBA and 15 minutes on LE ergometer with VSS on RA  Barriers to return to prior living situation: acute medical status; awaiting transplant  Recommendations for discharge: Currently pt would be appropriate for d/c to home with OP CR; will update post surgically  Rationale for recommendations: Pt is functionally safe to return to home at this time; would benefit from continued OP therapy to address conditioning and strength; however, pt anticipates remaining in the hospital until transplant. will require re-evaluation post surgically.       Entered by: Lynne Garcia 05/25/2018 11:02 AM

## 2018-05-25 NOTE — PROGRESS NOTES
"  Advanced Heart Failure and Transplant Cardiology  Progress Note:    Assessment and Plan:  63 year old male with a PMH of CAD, ICM (EF of 15-20%), ESRD, bicuspid aortic valve with AI, severe MR, and proximal AAA who was admitted for decompensated heart failure. He is on dobutamine 2.5 mcg/kg/min and listed for heart/kidney transplant as status 1A.    Major Changes:  - none    # Stage D NYHA Class III systolic heart failure secondary to ICM, listed 1A  # Severe mitral regurgitation  # Severe holodiastolic aortic insufficiency   - continue dobutamine 2.5 mcg/kg/min, dopamine 2.5 mcg/kg/min  - hydralazine 12.5 mg q6 hours on non-dialysis days + 10 mg BID on dialysis days (after HD)  - weekly CXR (next 5/28)  - hemodynamics remain stable    # Gout:  - prednisone taper through 5/31       # Chronic Medical Issues:  - Seborrheic keratoses / Dermatofibromas / Multiple benign nevi / Nummular dermatitis: derm consulted, all lesions benign  - Hx of allergic rhinitis: cetrizine  - ESRD: Dialysis T, Th, Sa   - Non-obstructive CAD, Anomalous RCA: ASA 81 mg, atorvastatin 40 mg daily  - Ascending aortic aneurysm: 4.5 x 4.5 cm proximal ascending aortic aneurysm seen on MRI 2/14  - Multiple benign branch duct-IPMNs: no concerning features affecting transplant candidacy      FEN: regular diet, 2L FR  PROPHY: ambulation  LINES: PICC  DISPO: TBD  CODE STATUS:  Full    Seen and discussed with Dr. Minaya.  Sukumar Mejía, CVD Fellow    ================================================================    Subjective/24-Hr Events:   - no acute issues overnight    ROS:  4 point ROS including respiratory, CV, GI and  (other than that noted in the HPI) is negative.     Medications: Reviewed in EPIC.     Physical Exam:   BP 94/63 (BP Location: Right arm)  Pulse 107  Temp 97.7  F (36.5  C) (Oral)  Resp 18  Ht 1.727 m (5' 8\")  Wt 77.1 kg (170 lb)  SpO2 100%  BMI 25.85 kg/m2    GENERAL: A&O x 3, NAD  NECK: no JVD, L-sided [place] in place " bandaged and c/d/i  CV: Mildly tachycardic, +S1S2, soft murmur across precordium  RESPIRATORY: CTAB including bases  GI: soft, NT, ND  EXTREMITIES: trace pretibial edema, 2+ bilateral pedal pulses. Warm.   NEUROLOGIC: Alert and oriented x 3. No focal deficits.   SKIN: No jaundice. No rashes or lesions.     Labs / Imaging:  - all labs and imaging reviewed

## 2018-05-26 LAB
ABO + RH BLD: NORMAL
ABO + RH BLD: NORMAL
ANION GAP SERPL CALCULATED.3IONS-SCNC: 9 MMOL/L (ref 3–14)
BASE EXCESS BLDV CALC-SCNC: 7.1 MMOL/L
BASE EXCESS BLDV CALC-SCNC: 8.4 MMOL/L
BLD GP AB SCN SERPL QL: NORMAL
BLOOD BANK CMNT PATIENT-IMP: NORMAL
BUN SERPL-MCNC: 40 MG/DL (ref 7–30)
CALCIUM SERPL-MCNC: 8.3 MG/DL (ref 8.5–10.1)
CHLORIDE SERPL-SCNC: 102 MMOL/L (ref 94–109)
CO2 SERPL-SCNC: 27 MMOL/L (ref 20–32)
CREAT SERPL-MCNC: 6.35 MG/DL (ref 0.66–1.25)
ERYTHROCYTE [DISTWIDTH] IN BLOOD BY AUTOMATED COUNT: 16.7 % (ref 10–15)
GFR SERPL CREATININE-BSD FRML MDRD: 9 ML/MIN/1.7M2
GLUCOSE BLDC GLUCOMTR-MCNC: 103 MG/DL (ref 70–99)
GLUCOSE BLDC GLUCOMTR-MCNC: 121 MG/DL (ref 70–99)
GLUCOSE BLDC GLUCOMTR-MCNC: 152 MG/DL (ref 70–99)
GLUCOSE BLDC GLUCOMTR-MCNC: 175 MG/DL (ref 70–99)
GLUCOSE SERPL-MCNC: 108 MG/DL (ref 70–99)
HCO3 BLDV-SCNC: 32 MMOL/L (ref 21–28)
HCO3 BLDV-SCNC: 33 MMOL/L (ref 21–28)
HCT VFR BLD AUTO: 26.6 % (ref 40–53)
HGB BLD-MCNC: 8.5 G/DL (ref 13.3–17.7)
MAGNESIUM SERPL-MCNC: 1.7 MG/DL (ref 1.6–2.3)
MAGNESIUM SERPL-MCNC: 2 MG/DL (ref 1.6–2.3)
MCH RBC QN AUTO: 29.1 PG (ref 26.5–33)
MCHC RBC AUTO-ENTMCNC: 32 G/DL (ref 31.5–36.5)
MCV RBC AUTO: 91 FL (ref 78–100)
O2/TOTAL GAS SETTING VFR VENT: 21 %
O2/TOTAL GAS SETTING VFR VENT: ABNORMAL %
OXYHGB MFR BLDV: 42 %
OXYHGB MFR BLDV: 51 %
PCO2 BLDV: 44 MM HG (ref 40–50)
PCO2 BLDV: 44 MM HG (ref 40–50)
PH BLDV: 7.47 PH (ref 7.32–7.43)
PH BLDV: 7.48 PH (ref 7.32–7.43)
PHOSPHATE SERPL-MCNC: 3.5 MG/DL (ref 2.5–4.5)
PLATELET # BLD AUTO: 261 10E9/L (ref 150–450)
PO2 BLDV: 25 MM HG (ref 25–47)
PO2 BLDV: 30 MM HG (ref 25–47)
POTASSIUM SERPL-SCNC: 3.5 MMOL/L (ref 3.4–5.3)
POTASSIUM SERPL-SCNC: 4 MMOL/L (ref 3.4–5.3)
RBC # BLD AUTO: 2.92 10E12/L (ref 4.4–5.9)
SODIUM SERPL-SCNC: 138 MMOL/L (ref 133–144)
SPECIMEN EXP DATE BLD: NORMAL
WBC # BLD AUTO: 9.7 10E9/L (ref 4–11)

## 2018-05-26 PROCEDURE — A9270 NON-COVERED ITEM OR SERVICE: HCPCS | Mod: GY | Performed by: INTERNAL MEDICINE

## 2018-05-26 PROCEDURE — 84132 ASSAY OF SERUM POTASSIUM: CPT | Performed by: INTERNAL MEDICINE

## 2018-05-26 PROCEDURE — A9270 NON-COVERED ITEM OR SERVICE: HCPCS | Mod: GY | Performed by: STUDENT IN AN ORGANIZED HEALTH CARE EDUCATION/TRAINING PROGRAM

## 2018-05-26 PROCEDURE — 25000125 ZZHC RX 250: Performed by: NURSE PRACTITIONER

## 2018-05-26 PROCEDURE — 25000132 ZZH RX MED GY IP 250 OP 250 PS 637: Mod: GY | Performed by: STUDENT IN AN ORGANIZED HEALTH CARE EDUCATION/TRAINING PROGRAM

## 2018-05-26 PROCEDURE — 86900 BLOOD TYPING SEROLOGIC ABO: CPT | Performed by: INTERNAL MEDICINE

## 2018-05-26 PROCEDURE — 25000132 ZZH RX MED GY IP 250 OP 250 PS 637: Performed by: NURSE PRACTITIONER

## 2018-05-26 PROCEDURE — 83735 ASSAY OF MAGNESIUM: CPT | Performed by: INTERNAL MEDICINE

## 2018-05-26 PROCEDURE — 25000128 H RX IP 250 OP 636: Performed by: STUDENT IN AN ORGANIZED HEALTH CARE EDUCATION/TRAINING PROGRAM

## 2018-05-26 PROCEDURE — 25000125 ZZHC RX 250: Performed by: STUDENT IN AN ORGANIZED HEALTH CARE EDUCATION/TRAINING PROGRAM

## 2018-05-26 PROCEDURE — 80048 BASIC METABOLIC PNL TOTAL CA: CPT | Performed by: NURSE PRACTITIONER

## 2018-05-26 PROCEDURE — 40000275 ZZH STATISTIC RCP TIME EA 10 MIN

## 2018-05-26 PROCEDURE — 85027 COMPLETE CBC AUTOMATED: CPT | Performed by: NURSE PRACTITIONER

## 2018-05-26 PROCEDURE — 82805 BLOOD GASES W/O2 SATURATION: CPT | Performed by: INTERNAL MEDICINE

## 2018-05-26 PROCEDURE — 40000196 ZZH STATISTIC RAPCV CVP MONITORING

## 2018-05-26 PROCEDURE — 25000128 H RX IP 250 OP 636: Performed by: NURSE PRACTITIONER

## 2018-05-26 PROCEDURE — 20000004 ZZH R&B ICU UMMC

## 2018-05-26 PROCEDURE — 00000146 ZZHCL STATISTIC GLUCOSE BY METER IP

## 2018-05-26 PROCEDURE — 99232 SBSQ HOSP IP/OBS MODERATE 35: CPT | Mod: GC | Performed by: INTERNAL MEDICINE

## 2018-05-26 PROCEDURE — 86850 RBC ANTIBODY SCREEN: CPT | Performed by: INTERNAL MEDICINE

## 2018-05-26 PROCEDURE — 83735 ASSAY OF MAGNESIUM: CPT | Performed by: NURSE PRACTITIONER

## 2018-05-26 PROCEDURE — 86901 BLOOD TYPING SEROLOGIC RH(D): CPT | Performed by: INTERNAL MEDICINE

## 2018-05-26 PROCEDURE — 25000132 ZZH RX MED GY IP 250 OP 250 PS 637: Mod: GY | Performed by: INTERNAL MEDICINE

## 2018-05-26 PROCEDURE — 63400005 ZZH RX 634: Performed by: INTERNAL MEDICINE

## 2018-05-26 PROCEDURE — A9270 NON-COVERED ITEM OR SERVICE: HCPCS | Mod: GY | Performed by: NURSE PRACTITIONER

## 2018-05-26 PROCEDURE — 94640 AIRWAY INHALATION TREATMENT: CPT

## 2018-05-26 PROCEDURE — 25000128 H RX IP 250 OP 636: Performed by: INTERNAL MEDICINE

## 2018-05-26 PROCEDURE — 40000048 ZZH STATISTIC DAILY SWAN MONITORING

## 2018-05-26 PROCEDURE — 84100 ASSAY OF PHOSPHORUS: CPT | Performed by: INTERNAL MEDICINE

## 2018-05-26 PROCEDURE — 90937 HEMODIALYSIS REPEATED EVAL: CPT

## 2018-05-26 PROCEDURE — 25000132 ZZH RX MED GY IP 250 OP 250 PS 637: Mod: GY | Performed by: NURSE PRACTITIONER

## 2018-05-26 RX ORDER — HEPARIN SODIUM 1000 [USP'U]/ML
500 INJECTION, SOLUTION INTRAVENOUS; SUBCUTANEOUS
Status: COMPLETED | OUTPATIENT
Start: 2018-05-26 | End: 2018-05-26

## 2018-05-26 RX ORDER — SODIUM PHOSPHATE,MONOBASIC
4 GRANULES (GRAM) MISCELLANEOUS ONCE
Status: COMPLETED | OUTPATIENT
Start: 2018-05-26 | End: 2018-05-26

## 2018-05-26 RX ORDER — ISOSORBIDE DINITRATE 10 MG/1
10 TABLET ORAL
Status: DISCONTINUED | OUTPATIENT
Start: 2018-05-27 | End: 2018-06-10

## 2018-05-26 RX ORDER — HEPARIN SODIUM 1000 [USP'U]/ML
500 INJECTION, SOLUTION INTRAVENOUS; SUBCUTANEOUS CONTINUOUS
Status: DISCONTINUED | OUTPATIENT
Start: 2018-05-26 | End: 2018-05-26

## 2018-05-26 RX ADMIN — Medication 1 CAPSULE: at 07:40

## 2018-05-26 RX ADMIN — HEPARIN SODIUM 3000 UNITS: 1000 INJECTION, SOLUTION INTRAVENOUS; SUBCUTANEOUS at 14:25

## 2018-05-26 RX ADMIN — SENNOSIDES AND DOCUSATE SODIUM 1 TABLET: 8.6; 5 TABLET ORAL at 04:32

## 2018-05-26 RX ADMIN — Medication 100 MG: at 07:40

## 2018-05-26 RX ADMIN — INSULIN GLARGINE 10 UNITS: 100 INJECTION, SOLUTION SUBCUTANEOUS at 09:29

## 2018-05-26 RX ADMIN — Medication 2 G: at 17:40

## 2018-05-26 RX ADMIN — VITAMIN B12 0.1 MG ORAL TABLET 100 MCG: 0.1 TABLET ORAL at 07:40

## 2018-05-26 RX ADMIN — Medication 4 MG%: at 10:55

## 2018-05-26 RX ADMIN — PREDNISONE 10 MG: 10 TABLET ORAL at 07:40

## 2018-05-26 RX ADMIN — INSULIN ASPART 5 UNITS: 100 INJECTION, SOLUTION INTRAVENOUS; SUBCUTANEOUS at 15:07

## 2018-05-26 RX ADMIN — POLYETHYLENE GLYCOL 3350 17 G: 17 POWDER, FOR SOLUTION ORAL at 07:30

## 2018-05-26 RX ADMIN — HEPARIN SODIUM 500 UNITS: 1000 INJECTION, SOLUTION INTRAVENOUS; SUBCUTANEOUS at 10:55

## 2018-05-26 RX ADMIN — ATORVASTATIN CALCIUM 40 MG: 40 TABLET, FILM COATED ORAL at 20:11

## 2018-05-26 RX ADMIN — INSULIN ASPART 2 UNITS: 100 INJECTION, SOLUTION INTRAVENOUS; SUBCUTANEOUS at 19:59

## 2018-05-26 RX ADMIN — HYDRALAZINE HYDROCHLORIDE 10 MG: 10 TABLET, FILM COATED ORAL at 15:22

## 2018-05-26 RX ADMIN — EPOETIN ALFA 4000 UNITS: 10000 SOLUTION INTRAVENOUS; SUBCUTANEOUS at 11:22

## 2018-05-26 RX ADMIN — ASPIRIN 81 MG CHEWABLE TABLET 81 MG: 81 TABLET CHEWABLE at 20:11

## 2018-05-26 RX ADMIN — ALLOPURINOL 100 MG: 100 TABLET ORAL at 07:40

## 2018-05-26 RX ADMIN — HEPARIN SODIUM 500 UNITS/HR: 1000 INJECTION, SOLUTION INTRAVENOUS; SUBCUTANEOUS at 11:20

## 2018-05-26 RX ADMIN — ALBUTEROL SULFATE 2.5 MG: 2.5 SOLUTION RESPIRATORY (INHALATION) at 22:01

## 2018-05-26 RX ADMIN — DOPAMINE HYDROCHLORIDE IN DEXTROSE 2.5 MCG/KG/MIN: 1.6 INJECTION, SOLUTION INTRAVENOUS at 20:17

## 2018-05-26 RX ADMIN — OMEPRAZOLE 20 MG: 20 CAPSULE, DELAYED RELEASE ORAL at 20:11

## 2018-05-26 RX ADMIN — HYDRALAZINE HYDROCHLORIDE 10 MG: 10 TABLET, FILM COATED ORAL at 20:11

## 2018-05-26 RX ADMIN — CETIRIZINE HYDROCHLORIDE 10 MG: 10 TABLET, FILM COATED ORAL at 07:40

## 2018-05-26 RX ADMIN — SODIUM CHLORIDE 300 ML: 9 INJECTION, SOLUTION INTRAVENOUS at 10:55

## 2018-05-26 RX ADMIN — FLUTICASONE PROPIONATE 1 SPRAY: 50 SPRAY, METERED NASAL at 20:11

## 2018-05-26 RX ADMIN — SODIUM CHLORIDE 250 ML: 9 INJECTION, SOLUTION INTRAVENOUS at 10:55

## 2018-05-26 RX ADMIN — SENNOSIDES AND DOCUSATE SODIUM 1 TABLET: 8.6; 5 TABLET ORAL at 20:11

## 2018-05-26 RX ADMIN — INSULIN ASPART 1 UNITS: 100 INJECTION, SOLUTION INTRAVENOUS; SUBCUTANEOUS at 15:07

## 2018-05-26 NOTE — PROGRESS NOTES
HEMODIALYSIS TREATMENT NOTE    Date: 5/26/2018  Time: 4:02 PM    Data:  Pre Wt: 78.9 kg (173 lb 15.1 oz)   Desired Wt: 76 kg   Post Wt: 76.8 kg (169 lb 5 oz)  Weight change: 2.1 kg  Ultrafiltration - Post Run Net Total Removed (mL): 2800 mL  Vascular Access Status: Yes, secured and visible  Dialyzer Rinse: Streaked  Total Blood Volume Processed: 60.5  Total Dialysis (Treatment) Time:  3.5 hr    Lab:   No    Interventions:  3.5 hr HD run on 4E, pt with swan and dobutamine gtt.  Pt alert and oriented, weighs himself with his own scale. Noted PO4 yesterday was 1.9, new order for bath at 4% Phos. VSS very stable, crit line showed -10% blood vol change. Pt states he did eat brkfst after weighing and that is why likely 0.8 over goal wt.     Assessment:  Stable run, awaiting heart/ kidney transplant. Could consider challenging wt further down as crit line was -10%.      Plan:    Next run Tues 5/29

## 2018-05-26 NOTE — PROGRESS NOTES
Nephrology Progress Note  05/26/2018         Assessment & Recommendations:   Murray Nicholson is a 63 yr old male with PMH of HTN, DMII, functionally bicuspid aortic valve, CHF/CM (EF 10-15%), ESRD, admitted with worsening dyspnea/SOB, now on dobutamine drip and being evaluated for heart/kidney transplant.      ESKD: due to DMII, on PD since Aug 2017, changed to iHD 1/2018 due to polymicrobial and fungal peritonitis, now on TTS schedule at [facility] under care of Dr Mcpherson. Access: tunneled RIJ (replaced 4/17/18). Run time: 4 hrs; EDW 76 kg.  - Dialysis per TTS schedule   - Using low dose heparin on HD  - Heparin lock CVC        Volume: EDW 76 kg. RHC 5/1 (done at 77 kg): RA 3, PCW 15; CVP 7   - Much prefer standing weights if possible (appreciate ICU obtaining pt's own scale!)      Severe AV and MR insufficiency:   BP/congestive CM (EF 10-15%); minimal CAD per angio on 2/8/17. Chronically hypotensive with tachycardia  - Goal MAP > 65 and SBP > 95 while dialyzing  - On dobutamine 2.5 mcg/kg/min (started 4/20/18)  - Listed for heart/kidney tx, in ICU on dobutamine and dopamine         Anemia: hgb 8.5 today, labs 5/8: ferritin 621, IS 27, iron 58; on maintenance venofer 50 mg qTuesday   - Continue venofer qTues  - On epogen 4000 units per HD      BMD: iCa 5.3, phos 1.9 yesterday (will add phos to dialysate); recent ; Vit D 27; on hectorol 0.5 mcg via HD; on phoslo 1 tab TID with meals.   - Stopped hectorol  - Stopped Phoslo  - Ordered sodium phosphate 4% for today's dialysis run      Recommendations were communicated to primary team via this note.       VERONICA Robbins CNP  Division of Kidney Disease      Interval History :   Seen at bedside. Doing well. Pleasant mood. Remains in ICU in DB drip.  Denies CP, SOB, chills, n/v.    Review of Systems:   4 point ROS negative other than as noted above.    Physical Exam:   I/O last 3 completed shifts:  In: 1246.4 [P.O.:860; I.V.:386.4]  Out: 25 [Urine:25]  "  /80  Pulse 107  Temp 98.5  F (36.9  C) (Oral)  Resp 12  Ht 1.727 m (5' 8\")  Wt 78.9 kg (173 lb 15.1 oz)  SpO2 100%  BMI 26.45 kg/m2     GENERAL APPEARANCE: alert, NAD  EYES: no scleral icterus, pupils equal   Pulmonary: lungs clear to auscultation with equal breath sounds bilaterally; has swan  CV: regular rhythm, tachycardia at baseline   - Edema trace  GI: soft, nontender, normal bowel sounds  MS: no evidence of inflammation in joints, no muscle tenderness  : no doyle  SKIN: no rash, warm, dry, no cyanosis  NEURO: face symmetric, no asterixis   Access: tunneled RIJ       Labs:   All labs reviewed by me  Electrolytes/Renal -   Recent Labs   Lab Test  05/26/18 0424 05/25/18 2122  05/25/18   0509  05/24/18   0331   NA  138   --   138  135   POTASSIUM  3.5  4.2  4.0  4.6   CHLORIDE  102   --   97  97   CO2  27   --   33*  30   BUN  40*   --   28  38*   CR  6.35*   --   5.41*  6.82*   GLC  108*   --   98  171*   TRINI  8.3*   --   9.5  9.5   MAG  2.0  2.0  1.8  2.1   PHOS   --   1.9*  2.3*  2.1*       CBC -   Recent Labs   Lab Test  05/26/18   0424 05/25/18   0509  05/24/18   0331   WBC  9.7  8.8  6.2   HGB  8.5*  8.9*  8.9*   PLT  261  258  223       LFTs -   Recent Labs   Lab Test  04/16/18   1546  03/07/18   0833  02/19/18   1558  02/02/18   1736   ALKPHOS  123  129  102  86   BILITOTAL  0.8  0.7  0.6  0.4   ALT  22  21  15  16   AST  17  18  18  20   PROTTOTAL  7.4   --   7.2  6.2*   ALBUMIN  3.5   --   2.7*  2.1*       Iron Panel -   Recent Labs   Lab Test  05/08/18   0954  07/19/17   1306  07/05/17   1204   IRON  58  46  26*   IRONSAT  27  18  12*   ALBERTINA  621*  369  542*         Imaging:  Reviewed    Current Medications:    albuterol  2.5 mg Nebulization At Bedtime     allopurinol  100 mg Oral Daily     aspirin  81 mg Oral Daily     atorvastatin  40 mg Oral Daily     cetirizine  10 mg Oral Daily     cyanocolbalamin  100 mcg Oral Daily     fluticasone  1-2 spray Both Nostrils Daily     gelatin " absorbable  1 each Topical During Hemodialysis (from stock)     heparin  3 mL Intracatheter During Hemodialysis (from stock)     heparin  3 mL Intracatheter During Hemodialysis (from stock)     hydrALAZINE  12.5 mg Oral 4 times per day on Sun Mon Wed Fri    And     hydrALAZINE  10 mg Oral 2 times per day on Tue Thu Sat     insulin aspart   Subcutaneous QAM AC     insulin aspart   Subcutaneous Daily with lunch     insulin aspart   Subcutaneous Daily with supper     insulin aspart  1-10 Units Subcutaneous TID AC     insulin aspart  1-7 Units Subcutaneous At Bedtime     insulin glargine  10 Units Subcutaneous QAM AC     [START ON 5/27/2018] isosorbide dinitrate  10 mg Oral 3 times per day on Sun Mon Wed Fri     NEPHROCAPS  1 capsule Oral Daily     omeprazole  20 mg Oral Daily     polyethylene glycol  17 g Oral Daily     predniSONE  10 mg Oral Daily    Followed by     [START ON 5/29/2018] predniSONE  5 mg Oral Daily     senna-docusate  1 tablet Oral BID     sodium chloride (PF)  10 mL Intracatheter Q7 Days     sodium chloride (PF)  3 mL Intracatheter During Hemodialysis (from stock)     sodium chloride (PF)  3 mL Intracatheter During Hemodialysis (from stock)     sodium chloride (PF)  3 mL Intracatheter During Hemodialysis (from stock)     sodium chloride (PF)  3 mL Intracatheter Q8H     vitamin  B-1  100 mg Oral Daily       DOBUTamine 2.5 mcg/kg/min (05/26/18 1200)     DOPamine 2.5 mcg/kg/min (05/26/18 1200)     heparin (porcine) 500 Units/hr (05/26/18 1120)     - MEDICATION INSTRUCTIONS -       Reason ACE/ARB/ARNI order not selected       Reason beta blocker order not selected       VERONICA Robbins CNP-C

## 2018-05-26 NOTE — PLAN OF CARE
Problem: Patient Care Overview  Goal: Plan of Care/Patient Progress Review  Outcome: No Change  Pt A/Ox4, VSS on RA, denies pain, up independent. See flowsheet for swan numbers, Dobutamine gtt at 2.5 mck/kg/min, Dopamine gtt at 2.5 mcg/kg/min. Pt had BM yesterday. Pt will get HD today. Pt status 1A for heart/kidney Tx. Awaiting 2g Mag from pharmacy. Continue to monitor and notify MD of questions or concerns.

## 2018-05-26 NOTE — PROGRESS NOTES
Advanced Heart Failure and Transplant Cardiology  Progress Note:    Assessment and Plan:  63 year old male with a PMH of CAD, ICM (EF of 15-20%), ESRD, bicuspid aortic valve with AI, severe MR, and proximal AAA who was admitted for decompensated heart failure. He is on dobutamine 2.5 mcg/kg/min and listed for heart/kidney transplant as status 1A.    Major Changes:  - watch NSVT carefully  - add isordil 10 mg TID on non HD days    # Stage D NYHA Class III systolic heart failure secondary to ICM, listed 1A  # Severe mitral regurgitation  # Severe holodiastolic aortic insufficiency   - continue dobutamine 2.5 mcg/kg/min, dopamine 2.5 mcg/kg/min  - watch NSVT carefully and consider suppressant if continues / worsens  - hydralazine 12.5 mg q6 hours on non-dialysis days + 10 mg BID on dialysis days (after HD)  - isordil 10 mg TID on non-HD days  - weekly CXR (next 5/28)  - hemodynamics remain stable    # Non-sustained ventricular tachycardia:  - watching carefully in setting of inotropes  - will add suppressant if worsens / prolonged episodes    # Gout:  - prednisone taper through 5/31       # Chronic Medical Issues:  - Seborrheic keratoses / Dermatofibromas / Multiple benign nevi / Nummular dermatitis: derm consulted, all lesions benign  - Hx of allergic rhinitis: cetrizine  - ESRD: Dialysis T, Th, Sa   - Non-obstructive CAD, Anomalous RCA: ASA 81 mg, atorvastatin 40 mg daily  - Ascending aortic aneurysm: 4.5 x 4.5 cm proximal ascending aortic aneurysm seen on MRI 2/14  - Multiple benign branch duct-IPMNs: no concerning features affecting transplant candidacy      FEN: regular diet, 2L FR  PROPHY: ambulation  LINES: PICC  DISPO: TBD  CODE STATUS:  Full    Seen and discussed with Dr. Minaya.  Sukumar Mejía, CVD Fellow    ================================================================    Subjective/24-Hr Events:   - no acute issues overnight  - 2 short runs of non-sustained VT (8 and 12 beats)    ROS:  4 point ROS  "including respiratory, CV, GI and  (other than that noted in the HPI) is negative.     Medications: Reviewed in EPIC.     Physical Exam:   /79  Pulse 107  Temp 98.5  F (36.9  C) (Oral)  Resp 12  Ht 1.727 m (5' 8\")  Wt 78.9 kg (173 lb 15.1 oz)  SpO2 100%  BMI 26.45 kg/m2    GENERAL: A&O x 3, NAD  NECK: no JVD, L-sided [place] in place bandaged and c/d/i  CV: Mildly tachycardic, +S1S2, soft murmur across precordium  RESPIRATORY: CTAB including bases  GI: soft, NT, ND  EXTREMITIES: trace pretibial edema, 2+ bilateral pedal pulses. Warm.   NEUROLOGIC: Alert and oriented x 3. No focal deficits.   SKIN: No jaundice. No rashes or lesions.     Labs / Imaging:  - all labs and imaging reviewed  "

## 2018-05-27 LAB
BASE DEFICIT BLDV-SCNC: 0.3 MMOL/L
BASE EXCESS BLDV CALC-SCNC: 6.7 MMOL/L
GLUCOSE BLDC GLUCOMTR-MCNC: 111 MG/DL (ref 70–99)
GLUCOSE BLDC GLUCOMTR-MCNC: 206 MG/DL (ref 70–99)
GLUCOSE BLDC GLUCOMTR-MCNC: 303 MG/DL (ref 70–99)
HCO3 BLDV-SCNC: 24 MMOL/L (ref 21–28)
HCO3 BLDV-SCNC: 31 MMOL/L (ref 21–28)
MAGNESIUM SERPL-MCNC: 2.4 MG/DL (ref 1.6–2.3)
O2/TOTAL GAS SETTING VFR VENT: 21 %
O2/TOTAL GAS SETTING VFR VENT: ABNORMAL %
OXYHGB MFR BLDV: 45 %
OXYHGB MFR BLDV: 55 %
PCO2 BLDV: 34 MM HG (ref 40–50)
PCO2 BLDV: 42 MM HG (ref 40–50)
PH BLDV: 7.45 PH (ref 7.32–7.43)
PH BLDV: 7.47 PH (ref 7.32–7.43)
PO2 BLDV: 28 MM HG (ref 25–47)
PO2 BLDV: 33 MM HG (ref 25–47)

## 2018-05-27 PROCEDURE — 25000125 ZZHC RX 250: Performed by: STUDENT IN AN ORGANIZED HEALTH CARE EDUCATION/TRAINING PROGRAM

## 2018-05-27 PROCEDURE — 25000132 ZZH RX MED GY IP 250 OP 250 PS 637: Performed by: NURSE PRACTITIONER

## 2018-05-27 PROCEDURE — 25000132 ZZH RX MED GY IP 250 OP 250 PS 637: Mod: GY | Performed by: NURSE PRACTITIONER

## 2018-05-27 PROCEDURE — 25000132 ZZH RX MED GY IP 250 OP 250 PS 637: Mod: GY | Performed by: STUDENT IN AN ORGANIZED HEALTH CARE EDUCATION/TRAINING PROGRAM

## 2018-05-27 PROCEDURE — 83735 ASSAY OF MAGNESIUM: CPT | Performed by: INTERNAL MEDICINE

## 2018-05-27 PROCEDURE — 40000196 ZZH STATISTIC RAPCV CVP MONITORING

## 2018-05-27 PROCEDURE — 82805 BLOOD GASES W/O2 SATURATION: CPT | Performed by: INTERNAL MEDICINE

## 2018-05-27 PROCEDURE — A9270 NON-COVERED ITEM OR SERVICE: HCPCS | Mod: GY | Performed by: INTERNAL MEDICINE

## 2018-05-27 PROCEDURE — A9270 NON-COVERED ITEM OR SERVICE: HCPCS | Mod: GY | Performed by: NURSE PRACTITIONER

## 2018-05-27 PROCEDURE — 25000132 ZZH RX MED GY IP 250 OP 250 PS 637: Performed by: STUDENT IN AN ORGANIZED HEALTH CARE EDUCATION/TRAINING PROGRAM

## 2018-05-27 PROCEDURE — 40000048 ZZH STATISTIC DAILY SWAN MONITORING

## 2018-05-27 PROCEDURE — 20000004 ZZH R&B ICU UMMC

## 2018-05-27 PROCEDURE — 99232 SBSQ HOSP IP/OBS MODERATE 35: CPT | Mod: GC | Performed by: INTERNAL MEDICINE

## 2018-05-27 PROCEDURE — 00000146 ZZHCL STATISTIC GLUCOSE BY METER IP

## 2018-05-27 PROCEDURE — 40000275 ZZH STATISTIC RCP TIME EA 10 MIN

## 2018-05-27 PROCEDURE — 25000132 ZZH RX MED GY IP 250 OP 250 PS 637: Mod: GY | Performed by: INTERNAL MEDICINE

## 2018-05-27 PROCEDURE — 25000132 ZZH RX MED GY IP 250 OP 250 PS 637: Performed by: INTERNAL MEDICINE

## 2018-05-27 PROCEDURE — 94640 AIRWAY INHALATION TREATMENT: CPT

## 2018-05-27 PROCEDURE — A9270 NON-COVERED ITEM OR SERVICE: HCPCS | Mod: GY | Performed by: STUDENT IN AN ORGANIZED HEALTH CARE EDUCATION/TRAINING PROGRAM

## 2018-05-27 RX ORDER — HYDRALAZINE HYDROCHLORIDE 10 MG/1
10 TABLET, FILM COATED ORAL
Status: DISCONTINUED | OUTPATIENT
Start: 2018-05-29 | End: 2018-06-11

## 2018-05-27 RX ORDER — HYDRALAZINE HYDROCHLORIDE 25 MG/1
25 TABLET, FILM COATED ORAL
Status: DISCONTINUED | OUTPATIENT
Start: 2018-05-27 | End: 2018-06-11

## 2018-05-27 RX ADMIN — Medication 100 MG: at 08:22

## 2018-05-27 RX ADMIN — INSULIN ASPART 6 UNITS: 100 INJECTION, SOLUTION INTRAVENOUS; SUBCUTANEOUS at 14:45

## 2018-05-27 RX ADMIN — Medication 25 MG: at 17:04

## 2018-05-27 RX ADMIN — Medication 25 MG: at 12:21

## 2018-05-27 RX ADMIN — ASPIRIN 81 MG CHEWABLE TABLET 81 MG: 81 TABLET CHEWABLE at 20:00

## 2018-05-27 RX ADMIN — ISOSORBIDE DINITRATE 10 MG: 10 TABLET ORAL at 20:00

## 2018-05-27 RX ADMIN — SENNOSIDES AND DOCUSATE SODIUM 1 TABLET: 8.6; 5 TABLET ORAL at 08:22

## 2018-05-27 RX ADMIN — FLUTICASONE PROPIONATE 2 SPRAY: 50 SPRAY, METERED NASAL at 20:06

## 2018-05-27 RX ADMIN — Medication 25 MG: at 23:00

## 2018-05-27 RX ADMIN — POLYETHYLENE GLYCOL 3350 17 G: 17 POWDER, FOR SOLUTION ORAL at 08:22

## 2018-05-27 RX ADMIN — Medication 25 MG: at 08:25

## 2018-05-27 RX ADMIN — INSULIN GLARGINE 10 UNITS: 100 INJECTION, SOLUTION SUBCUTANEOUS at 08:35

## 2018-05-27 RX ADMIN — ALBUTEROL SULFATE 2.5 MG: 2.5 SOLUTION RESPIRATORY (INHALATION) at 22:27

## 2018-05-27 RX ADMIN — ALLOPURINOL 100 MG: 100 TABLET ORAL at 08:21

## 2018-05-27 RX ADMIN — CETIRIZINE HYDROCHLORIDE 10 MG: 10 TABLET, FILM COATED ORAL at 08:21

## 2018-05-27 RX ADMIN — ISOSORBIDE DINITRATE 10 MG: 10 TABLET ORAL at 08:25

## 2018-05-27 RX ADMIN — INSULIN ASPART 3 UNITS: 100 INJECTION, SOLUTION INTRAVENOUS; SUBCUTANEOUS at 13:52

## 2018-05-27 RX ADMIN — PREDNISONE 10 MG: 10 TABLET ORAL at 08:22

## 2018-05-27 RX ADMIN — ISOSORBIDE DINITRATE 10 MG: 10 TABLET ORAL at 14:10

## 2018-05-27 RX ADMIN — Medication 1 CAPSULE: at 08:22

## 2018-05-27 RX ADMIN — SENNOSIDES AND DOCUSATE SODIUM 1 TABLET: 8.6; 5 TABLET ORAL at 20:00

## 2018-05-27 RX ADMIN — OMEPRAZOLE 20 MG: 20 CAPSULE, DELAYED RELEASE ORAL at 20:00

## 2018-05-27 RX ADMIN — ATORVASTATIN CALCIUM 40 MG: 40 TABLET, FILM COATED ORAL at 20:00

## 2018-05-27 RX ADMIN — VITAMIN B12 0.1 MG ORAL TABLET 100 MCG: 0.1 TABLET ORAL at 08:21

## 2018-05-27 NOTE — PROGRESS NOTES
Pt stable throughout the day. Call light approp. Dobutamine and Dopamine infusing as ordered. RA to 2L. See flowsheets for vital signs. Ambulated in unit x 1. HD compelted. Mag replaced. Medications given as ordered.

## 2018-05-27 NOTE — PLAN OF CARE
Problem: Patient Care Overview  Goal: Plan of Care/Patient Progress Review  Outcome: No Change  Pt remains neurologically intact. On RA, occasionally on 2L for comfort, sats in high 90s. ST up to 120s, one short run of VT (6 beats). Remains on dobutamine and dopamine at 2.5. Independent in room, up to commode x2. Will continue to monitor.

## 2018-05-27 NOTE — PROGRESS NOTES
Advanced Heart Failure and Transplant Cardiology  Progress Note:    Assessment and Plan:  63 year old male with a PMH of CAD, ICM (EF of 15-20%), ESRD, bicuspid aortic valve with AI, severe MR, and proximal AAA who was admitted for decompensated heart failure. He is on dobutamine 2.5 mcg/kg/min and listed for heart/kidney transplant as status 1A.    Major Changes:  - increase hydralazine to 25mg from 12.5 on non dialysis days    # Stage D NYHA Class III systolic heart failure secondary to ICM, listed 1A  # Severe mitral regurgitation  # Severe holodiastolic aortic insufficiency   - continue dobutamine 2.5 mcg/kg/min, dopamine 2.5 mcg/kg/min  - watch NSVT carefully and consider suppressant if continues / worsens  - hydralazine 25 mg q6 hours on non-dialysis days + 10 mg BID on dialysis days (after HD)  - isordil 10 mg TID on non-HD days  - weekly CXR (next 5/28)  - hemodynamics remain stable    # Non-sustained ventricular tachycardia:  - watching carefully in setting of inotropes  - will add suppressant if worsens / prolonged episodes    # Gout:  - prednisone taper through 5/31       # Chronic Medical Issues:  - Seborrheic keratoses / Dermatofibromas / Multiple benign nevi / Nummular dermatitis: derm consulted, all lesions benign  - Hx of allergic rhinitis: cetrizine  - ESRD: Dialysis T, Th, Sa   - Non-obstructive CAD, Anomalous RCA: ASA 81 mg, atorvastatin 40 mg daily  - Ascending aortic aneurysm: 4.5 x 4.5 cm proximal ascending aortic aneurysm seen on MRI 2/14  - Multiple benign branch duct-IPMNs: no concerning features affecting transplant candidacy      FEN: regular diet, 2L FR  PROPHY: ambulation  LINES: PICC  DISPO: TBD  CODE STATUS:  Full    Norma Stephenson MD PGY4  Cardiology Fellow  [phone]      ================================================================    Subjective/24-Hr Events:   - no acute issues overnight  - 1 run of NSVT 6 beats    ROS:  4 point ROS including respiratory, CV, GI and   "(other than that noted in the HPI) is negative.     Medications: Reviewed in EPIC.     Physical Exam:   BP 99/61 (BP Location: Right arm)  Pulse 107  Temp 97.5  F (36.4  C) (Oral)  Resp 14  Ht 1.727 m (5' 8\")  Wt 76.8 kg (169 lb 5 oz)  SpO2 100%  BMI 25.74 kg/m2    GENERAL: A&O x 3, NAD  NECK: no JVD, L-sided [place] in place bandaged and c/d/i  CV: Mildly tachycardic, +S1S2, soft murmur across precordium  RESPIRATORY: CTAB including bases  GI: soft, NT, ND  EXTREMITIES: trace pretibial edema, 2+ bilateral pedal pulses. Warm.   NEUROLOGIC: Alert and oriented x 3. No focal deficits.   SKIN: No jaundice. No rashes or lesions.     Labs / Imaging:  - all labs and imaging reviewed  "

## 2018-05-27 NOTE — PROGRESS NOTES
Nephrology chart check:    Chart and labs reviewed. No acute need for dialysis today.    Lauren Anderson APRN, CNP  Division of kidney disease

## 2018-05-28 ENCOUNTER — APPOINTMENT (OUTPATIENT)
Dept: GENERAL RADIOLOGY | Facility: CLINIC | Age: 63
DRG: 001 | End: 2018-05-28
Attending: INTERNAL MEDICINE
Payer: COMMERCIAL

## 2018-05-28 LAB
ANION GAP SERPL CALCULATED.3IONS-SCNC: 11 MMOL/L (ref 3–14)
BASE EXCESS BLDV CALC-SCNC: 2.5 MMOL/L
BASE EXCESS BLDV CALC-SCNC: 3.8 MMOL/L
BUN SERPL-MCNC: 51 MG/DL (ref 7–30)
CALCIUM SERPL-MCNC: 8.8 MG/DL (ref 8.5–10.1)
CHLORIDE SERPL-SCNC: 99 MMOL/L (ref 94–109)
CO2 SERPL-SCNC: 27 MMOL/L (ref 20–32)
CREAT SERPL-MCNC: 7.36 MG/DL (ref 0.66–1.25)
ERYTHROCYTE [DISTWIDTH] IN BLOOD BY AUTOMATED COUNT: 16.7 % (ref 10–15)
GFR SERPL CREATININE-BSD FRML MDRD: 8 ML/MIN/1.7M2
GLUCOSE BLDC GLUCOMTR-MCNC: 123 MG/DL (ref 70–99)
GLUCOSE BLDC GLUCOMTR-MCNC: 144 MG/DL (ref 70–99)
GLUCOSE BLDC GLUCOMTR-MCNC: 150 MG/DL (ref 70–99)
GLUCOSE BLDC GLUCOMTR-MCNC: 272 MG/DL (ref 70–99)
GLUCOSE SERPL-MCNC: 149 MG/DL (ref 70–99)
HCO3 BLDV-SCNC: 27 MMOL/L (ref 21–28)
HCO3 BLDV-SCNC: 28 MMOL/L (ref 21–28)
HCT VFR BLD AUTO: 26.2 % (ref 40–53)
HGB BLD-MCNC: 8.5 G/DL (ref 13.3–17.7)
MCH RBC QN AUTO: 29.4 PG (ref 26.5–33)
MCHC RBC AUTO-ENTMCNC: 32.4 G/DL (ref 31.5–36.5)
MCV RBC AUTO: 91 FL (ref 78–100)
O2/TOTAL GAS SETTING VFR VENT: 21 %
O2/TOTAL GAS SETTING VFR VENT: ABNORMAL %
OXYHGB MFR BLDV: 54 %
OXYHGB MFR BLDV: 57 %
PCO2 BLDV: 39 MM HG (ref 40–50)
PCO2 BLDV: 41 MM HG (ref 40–50)
PH BLDV: 7.44 PH (ref 7.32–7.43)
PH BLDV: 7.45 PH (ref 7.32–7.43)
PLATELET # BLD AUTO: 274 10E9/L (ref 150–450)
PO2 BLDV: 31 MM HG (ref 25–47)
PO2 BLDV: 34 MM HG (ref 25–47)
POTASSIUM SERPL-SCNC: 4.3 MMOL/L (ref 3.4–5.3)
RBC # BLD AUTO: 2.89 10E12/L (ref 4.4–5.9)
SODIUM SERPL-SCNC: 136 MMOL/L (ref 133–144)
WBC # BLD AUTO: 8 10E9/L (ref 4–11)

## 2018-05-28 PROCEDURE — A9270 NON-COVERED ITEM OR SERVICE: HCPCS | Mod: GY | Performed by: INTERNAL MEDICINE

## 2018-05-28 PROCEDURE — 94640 AIRWAY INHALATION TREATMENT: CPT | Mod: 76

## 2018-05-28 PROCEDURE — 40000048 ZZH STATISTIC DAILY SWAN MONITORING

## 2018-05-28 PROCEDURE — A9270 NON-COVERED ITEM OR SERVICE: HCPCS | Mod: GY | Performed by: STUDENT IN AN ORGANIZED HEALTH CARE EDUCATION/TRAINING PROGRAM

## 2018-05-28 PROCEDURE — 25000125 ZZHC RX 250: Performed by: STUDENT IN AN ORGANIZED HEALTH CARE EDUCATION/TRAINING PROGRAM

## 2018-05-28 PROCEDURE — 25000132 ZZH RX MED GY IP 250 OP 250 PS 637: Mod: GY | Performed by: STUDENT IN AN ORGANIZED HEALTH CARE EDUCATION/TRAINING PROGRAM

## 2018-05-28 PROCEDURE — 80048 BASIC METABOLIC PNL TOTAL CA: CPT | Performed by: INTERNAL MEDICINE

## 2018-05-28 PROCEDURE — 25000132 ZZH RX MED GY IP 250 OP 250 PS 637: Mod: GY | Performed by: NURSE PRACTITIONER

## 2018-05-28 PROCEDURE — 40000196 ZZH STATISTIC RAPCV CVP MONITORING

## 2018-05-28 PROCEDURE — 25000128 H RX IP 250 OP 636: Performed by: INTERNAL MEDICINE

## 2018-05-28 PROCEDURE — 82805 BLOOD GASES W/O2 SATURATION: CPT | Performed by: INTERNAL MEDICINE

## 2018-05-28 PROCEDURE — 00000146 ZZHCL STATISTIC GLUCOSE BY METER IP

## 2018-05-28 PROCEDURE — 25000128 H RX IP 250 OP 636: Performed by: STUDENT IN AN ORGANIZED HEALTH CARE EDUCATION/TRAINING PROGRAM

## 2018-05-28 PROCEDURE — A9270 NON-COVERED ITEM OR SERVICE: HCPCS | Mod: GY | Performed by: NURSE PRACTITIONER

## 2018-05-28 PROCEDURE — 25000132 ZZH RX MED GY IP 250 OP 250 PS 637: Mod: GY | Performed by: INTERNAL MEDICINE

## 2018-05-28 PROCEDURE — 20000004 ZZH R&B ICU UMMC

## 2018-05-28 PROCEDURE — 25000132 ZZH RX MED GY IP 250 OP 250 PS 637: Performed by: NURSE PRACTITIONER

## 2018-05-28 PROCEDURE — 85027 COMPLETE CBC AUTOMATED: CPT | Performed by: INTERNAL MEDICINE

## 2018-05-28 PROCEDURE — 71045 X-RAY EXAM CHEST 1 VIEW: CPT

## 2018-05-28 PROCEDURE — 40000275 ZZH STATISTIC RCP TIME EA 10 MIN

## 2018-05-28 PROCEDURE — 99232 SBSQ HOSP IP/OBS MODERATE 35: CPT | Mod: GC | Performed by: INTERNAL MEDICINE

## 2018-05-28 PROCEDURE — 25000131 ZZH RX MED GY IP 250 OP 636 PS 637: Performed by: NURSE PRACTITIONER

## 2018-05-28 RX ADMIN — VITAMIN B12 0.1 MG ORAL TABLET 100 MCG: 0.1 TABLET ORAL at 08:09

## 2018-05-28 RX ADMIN — Medication 25 MG: at 18:52

## 2018-05-28 RX ADMIN — Medication 25 MG: at 12:21

## 2018-05-28 RX ADMIN — DOPAMINE HYDROCHLORIDE IN DEXTROSE 2.5 MCG/KG/MIN: 1.6 INJECTION, SOLUTION INTRAVENOUS at 05:16

## 2018-05-28 RX ADMIN — SENNOSIDES AND DOCUSATE SODIUM 1 TABLET: 8.6; 5 TABLET ORAL at 19:58

## 2018-05-28 RX ADMIN — Medication 25 MG: at 08:16

## 2018-05-28 RX ADMIN — Medication 25 MG: at 21:47

## 2018-05-28 RX ADMIN — INSULIN ASPART 1 UNITS: 100 INJECTION, SOLUTION INTRAVENOUS; SUBCUTANEOUS at 18:53

## 2018-05-28 RX ADMIN — FLUTICASONE PROPIONATE 2 SPRAY: 50 SPRAY, METERED NASAL at 20:03

## 2018-05-28 RX ADMIN — OMEPRAZOLE 20 MG: 20 CAPSULE, DELAYED RELEASE ORAL at 19:58

## 2018-05-28 RX ADMIN — INSULIN GLARGINE 10 UNITS: 100 INJECTION, SOLUTION SUBCUTANEOUS at 09:17

## 2018-05-28 RX ADMIN — ATORVASTATIN CALCIUM 40 MG: 40 TABLET, FILM COATED ORAL at 19:58

## 2018-05-28 RX ADMIN — ASPIRIN 81 MG CHEWABLE TABLET 81 MG: 81 TABLET CHEWABLE at 19:58

## 2018-05-28 RX ADMIN — CETIRIZINE HYDROCHLORIDE 10 MG: 10 TABLET, FILM COATED ORAL at 08:10

## 2018-05-28 RX ADMIN — ALLOPURINOL 100 MG: 100 TABLET ORAL at 08:09

## 2018-05-28 RX ADMIN — ALBUTEROL SULFATE 2.5 MG: 2.5 SOLUTION RESPIRATORY (INHALATION) at 21:48

## 2018-05-28 RX ADMIN — INSULIN ASPART 1 UNITS: 100 INJECTION, SOLUTION INTRAVENOUS; SUBCUTANEOUS at 14:21

## 2018-05-28 RX ADMIN — DOBUTAMINE HYDROCHLORIDE 2.5 MCG/KG/MIN: 400 INJECTION INTRAVENOUS at 05:15

## 2018-05-28 RX ADMIN — ISOSORBIDE DINITRATE 10 MG: 10 TABLET ORAL at 14:26

## 2018-05-28 RX ADMIN — ISOSORBIDE DINITRATE 10 MG: 10 TABLET ORAL at 20:02

## 2018-05-28 RX ADMIN — PREDNISONE 10 MG: 10 TABLET ORAL at 08:10

## 2018-05-28 RX ADMIN — Medication 100 MG: at 08:10

## 2018-05-28 RX ADMIN — SENNOSIDES AND DOCUSATE SODIUM 1 TABLET: 8.6; 5 TABLET ORAL at 08:09

## 2018-05-28 RX ADMIN — INSULIN ASPART 5 UNITS: 100 INJECTION, SOLUTION INTRAVENOUS; SUBCUTANEOUS at 14:21

## 2018-05-28 RX ADMIN — Medication 1 CAPSULE: at 08:10

## 2018-05-28 RX ADMIN — ISOSORBIDE DINITRATE 10 MG: 10 TABLET ORAL at 08:16

## 2018-05-28 RX ADMIN — POLYETHYLENE GLYCOL 3350 17 G: 17 POWDER, FOR SOLUTION ORAL at 08:09

## 2018-05-28 NOTE — PLAN OF CARE
Problem: Patient Care Overview  Goal: Plan of Care/Patient Progress Review  Outcome: No Change  Patient remained stable today. Sinus Tachy 100-110s. MAPs >65. Afebrile. RA/2L NC for comfort. Lung sounds clear. A&Ox4. Up and ambulating independently. On Dopamine and Dobutamine drips. BECKA numbers: CVP 12, PA 46/32, SVO2 55, CO 4.6, CI 2.4, SVR 1231. Regular diet with 2L FR. Anuric. 1 large bm this afternoon. No complaints of pain. Will continue to monitor and update MDs with any changes.

## 2018-05-28 NOTE — PLAN OF CARE
Problem: Patient Care Overview  Goal: Plan of Care/Patient Progress Review  Outcome: Improving  Pt pleasant with staff. -120s. MAPs stable. See flowsheets for hemodynamics. Lungs clear. RA to 2L via NC. Anuric. Plan for dialysis tomorrow. BM x 2. Walked in halls x 2 today with staff.

## 2018-05-28 NOTE — PROGRESS NOTES
Advanced Heart Failure and Transplant Cardiology  Progress Note:    Assessment and Plan:  63 year old male with a PMH of CAD, ICM (EF of 15-20%), ESRD, bicuspid aortic valve with AI, severe MR, and proximal AAA who was admitted for decompensated heart failure. He is on dobutamine 2.5 mcg/kg/min and listed for heart/kidney transplant as status 1A.    Major Changes:  - CXR to eval [place] position (weekly)    # Stage D NYHA Class III systolic heart failure secondary to ICM, listed 1A  # Severe mitral regurgitation  # Severe holodiastolic aortic insufficiency   - continue dobutamine 2.5 mcg/kg/min, dopamine 2.5 mcg/kg/min  - watch NSVT carefully and consider suppressant if continues / worsens  - hydralazine 25 mg q6 hours on non-dialysis days + 10 mg BID on dialysis days (after HD)  - isordil 10 mg TID on non-HD days  - weekly CXR (next 6/4/18) for [place] position  - hemodynamics remain stable  - nephrology challenging dry weight    # Non-sustained ventricular tachycardia:  - watching carefully in setting of inotropes  - will add suppressant if worsens / prolonged episodes    # Gout:  - prednisone taper through 5/31       # Chronic Medical Issues:  - Seborrheic keratoses / Dermatofibromas / Multiple benign nevi / Nummular dermatitis: derm consulted, all lesions benign  - Hx of allergic rhinitis: cetrizine  - ESRD: Dialysis T, Th, Sa   - Non-obstructive CAD, Anomalous RCA: ASA 81 mg, atorvastatin 40 mg daily  - Ascending aortic aneurysm: 4.5 x 4.5 cm proximal ascending aortic aneurysm seen on MRI 2/14  - Multiple benign branch duct-IPMNs: no concerning features affecting transplant candidacy      FEN: regular diet, 2L FR  PROPHY: ambulation  LINES: PICC  DISPO: TBD  CODE STATUS:  Full    Sukumar Mejía, CVD Fellow  Seen and discussed with Dr. Minaya.    ================================================================  Subjective/24-Hr Events:   - no acute issues overnight  - 1 run of NSVT 6 beats    ROS:  4 point ROS including  "respiratory, CV, GI and  (other than that noted in the HPI) is negative.     Medications: Reviewed in EPIC.     Physical Exam:   /64  Pulse 107  Temp 98.1  F (36.7  C) (Oral)  Resp 18  Ht 1.727 m (5' 8\")  Wt 78.5 kg (173 lb 2.7 oz)  SpO2 100%  BMI 26.33 kg/m2    GENERAL: A&O x 3, NAD  NECK: no JVD, L-sided [place] in place bandaged and c/d/i  CV: Mildly tachycardic, +S1S2, soft murmur across precordium  RESPIRATORY: CTAB including bases  GI: soft, NT, ND  EXTREMITIES: trace pretibial edema, 2+ bilateral pedal pulses. Warm.   NEUROLOGIC: Alert and oriented x 3. No focal deficits.   SKIN: No jaundice. No rashes or lesions.     Labs / Imaging:  - all labs and imaging reviewed  "

## 2018-05-28 NOTE — PROGRESS NOTES
Nephrology Progress Note  05/28/2018         Assessment & Recommendations:   Murray Nicholson is a 63 yr old male with PMH of HTN, DMII, functionally bicuspid aortic valve, CHF/CM (EF 10-15%), ESRD, admitted with worsening dyspnea/SOB, now on dobutamine drip and being evaluated for heart/kidney transplant.      ESKD: due to DMII, on PD since Aug 2017, changed to iHD 1/2018 due to polymicrobial and fungal peritonitis, now on TTS schedule at [facility] under care of Dr Mcpherson. Access: tunneled RIJ (replaced 4/17/18). Run time: 4 hrs; EDW 76 kg.  - Dialysis per TTS schedule, will dialyze tomorrow per usual schedule  - Using low dose heparin on HD  - Heparin lock CVC      Volume: EDW 76 kg. RHC 5/1 (done at 77 kg): RA 3, PCW 15; CVP 7   - Much prefer standing weights if possible (appreciate ICU obtaining pt's own scale!)  - Will challenge EDW down as tolerated, will try for 0.5 kg more on tomorrow's run - if tolerated. Dialysis run on 5/26 with crit line -10%.    Severe AV and MR insufficiency:   BP/congestive CM (EF 10-15%); minimal CAD per angio on 2/8/17. Chronically hypotensive with tachycardia  - Goal MAP > 65 and SBP > 95 while dialyzing  - On dobutamine 2.5 mcg/kg/min (started 4/20/18)  - Hydralazine has been increased to 25 mg on non-dialysis days  - Listed for heart/kidney tx, in ICU on dobutamine and dopamine         Anemia: hgb 8.5 today, labs 5/8: ferritin 621, IS 27, iron 58; on maintenance venofer 50 mg qTuesday   - Continue venofer 50 mg Tuesday HD  - On epogen 4000 units per HD      BMD: iCa 5.3, phos 1.9 on 5/25 (added 4% sodium phosphate to Sat's run); recheck of phos on 5/26 after dialysis run was 3.5. Recent ; Vit D 27; previously on hectorol 0.5 mcg via HD. Previously on one tab PhosLo with meals.  - Stopped hectorol  - Stopped Phoslo  - Will recheck Phos tomorrow morning     GOUT:  Prednisone taper through 5/31        Recommendations were communicated to primary team via this note.    "    VERONICA Robbins CNP  Division of Kidney Disease      Interval History :   Seen at bedside. Remains in ICU on DB drip.  Denies CP, n/v. Had some SOB this morning. He would really like to challenge his EDW down by 0.5 kg. Agree to challenge EDW slightly per notes from dialysis run on 5/26 with crit line -10%.    Review of Systems:   4 point ROS negative other than as noted above.    Physical Exam:   I/O last 3 completed shifts:  In: 700.6 [P.O.:320; I.V.:380.6]  Out: 0    /64  Pulse 107  Temp 98.1  F (36.7  C) (Oral)  Resp 18  Ht 1.727 m (5' 8\")  Wt 78.5 kg (173 lb 2.7 oz)  SpO2 100%  BMI 26.33 kg/m2     GENERAL APPEARANCE: alert, NAD  EYES: no scleral icterus, pupils equal   Pulmonary: lungs clear to auscultation with equal breath sounds bilaterally; has swan  CV: regular rhythm, tachycardia at baseline   - Edema trace  GI: soft, nontende  : no doyle  SKIN: no rash, warm, dry, no cyanosis  NEURO: face symmetric, no asterixis   Access: tunneled RIJ       Labs:   All labs reviewed by me  Electrolytes/Renal -   Recent Labs   Lab Test  05/28/18 0443 05/27/18 0345 05/26/18   1632  05/26/18 0424 05/25/18 2122  05/25/18   0509   NA  136   --    --   138   --   138   POTASSIUM  4.3   --   4.0  3.5  4.2  4.0   CHLORIDE  99   --    --   102   --   97   CO2  27   --    --   27   --   33*   BUN  51*   --    --   40*   --   28   CR  7.36*   --    --   6.35*   --   5.41*   GLC  149*   --    --   108*   --   98   TRINI  8.8   --    --   8.3*   --   9.5   MAG   --   2.4*  1.7  2.0  2.0  1.8   PHOS   --    --   3.5   --   1.9*  2.3*       CBC -   Recent Labs   Lab Test  05/28/18 0443 05/26/18   0424 05/25/18   0509   WBC  8.0  9.7  8.8   HGB  8.5*  8.5*  8.9*   PLT  274  261  258       LFTs -   Recent Labs   Lab Test  04/16/18   1546  03/07/18   0833  02/19/18   1558  02/02/18   1736   ALKPHOS  123  129  102  86   BILITOTAL  0.8  0.7  0.6  0.4   ALT  22  21  15  16   AST  17  18  18  20 "   PROTTOTAL  7.4   --   7.2  6.2*   ALBUMIN  3.5   --   2.7*  2.1*       Iron Panel -   Recent Labs   Lab Test  05/08/18   0954  07/19/17   1306  07/05/17   1204   IRON  58  46  26*   IRONSAT  27  18  12*   ALBERTINA  621*  369  542*         Imaging:  Reviewed    Current Medications:    albuterol  2.5 mg Nebulization At Bedtime     allopurinol  100 mg Oral Daily     aspirin  81 mg Oral Daily     atorvastatin  40 mg Oral Daily     cetirizine  10 mg Oral Daily     cyanocolbalamin  100 mcg Oral Daily     fluticasone  1-2 spray Both Nostrils Daily     hydrALAZINE  25 mg Oral 4 times per day on Sun Mon Wed Fri    And     [START ON 5/29/2018] hydrALAZINE  10 mg Oral 2 times per day on Tue Thu Sat     insulin aspart   Subcutaneous QAM AC     insulin aspart   Subcutaneous Daily with lunch     insulin aspart   Subcutaneous Daily with supper     insulin aspart  1-10 Units Subcutaneous TID AC     insulin aspart  1-7 Units Subcutaneous At Bedtime     insulin glargine  10 Units Subcutaneous QAM AC     isosorbide dinitrate  10 mg Oral 3 times per day on Sun Mon Wed Fri     NEPHROCAPS  1 capsule Oral Daily     omeprazole  20 mg Oral Daily     polyethylene glycol  17 g Oral Daily     [START ON 5/29/2018] predniSONE  5 mg Oral Daily     senna-docusate  1 tablet Oral BID     sodium chloride (PF)  10 mL Intracatheter Q7 Days     sodium chloride (PF)  3 mL Intracatheter Q8H     vitamin  B-1  100 mg Oral Daily       DOBUTamine 2.5 mcg/kg/min (05/28/18 1000)     DOPamine 2.5 mcg/kg/min (05/28/18 1000)     - MEDICATION INSTRUCTIONS -       Reason ACE/ARB/ARNI order not selected       Reason beta blocker order not selected       Lauren Anderson, APRN CNP

## 2018-05-28 NOTE — PLAN OF CARE
Problem: Patient Care Overview  Goal: Plan of Care/Patient Progress Review  Outcome: No Change  No changes. CVP 10, PA 52/26, SvO2 54. Sinus Tach.  2.5, DPA 2.5.     Jasper Aguero RN 05/28/18 6:46 AM    Hours of cares [phone]

## 2018-05-29 LAB
ANION GAP SERPL CALCULATED.3IONS-SCNC: 13 MMOL/L (ref 3–14)
BASE DEFICIT BLDV-SCNC: 1.8 MMOL/L
BASE EXCESS BLDV CALC-SCNC: 4.9 MMOL/L
BUN SERPL-MCNC: 66 MG/DL (ref 7–30)
CALCIUM SERPL-MCNC: 9.1 MG/DL (ref 8.5–10.1)
CHLORIDE SERPL-SCNC: 101 MMOL/L (ref 94–109)
CO2 SERPL-SCNC: 25 MMOL/L (ref 20–32)
CREAT SERPL-MCNC: 9.5 MG/DL (ref 0.66–1.25)
ERYTHROCYTE [DISTWIDTH] IN BLOOD BY AUTOMATED COUNT: 16.6 % (ref 10–15)
GFR SERPL CREATININE-BSD FRML MDRD: 6 ML/MIN/1.7M2
GLUCOSE BLDC GLUCOMTR-MCNC: 122 MG/DL (ref 70–99)
GLUCOSE BLDC GLUCOMTR-MCNC: 171 MG/DL (ref 70–99)
GLUCOSE BLDC GLUCOMTR-MCNC: 94 MG/DL (ref 70–99)
GLUCOSE BLDC GLUCOMTR-MCNC: 98 MG/DL (ref 70–99)
GLUCOSE SERPL-MCNC: 101 MG/DL (ref 70–99)
HCO3 BLDV-SCNC: 22 MMOL/L (ref 21–28)
HCO3 BLDV-SCNC: 29 MMOL/L (ref 21–28)
HCT VFR BLD AUTO: 26.3 % (ref 40–53)
HGB BLD-MCNC: 8.3 G/DL (ref 13.3–17.7)
MAGNESIUM SERPL-MCNC: 2.2 MG/DL (ref 1.6–2.3)
MCH RBC QN AUTO: 29.1 PG (ref 26.5–33)
MCHC RBC AUTO-ENTMCNC: 31.6 G/DL (ref 31.5–36.5)
MCV RBC AUTO: 92 FL (ref 78–100)
O2/TOTAL GAS SETTING VFR VENT: 21 %
O2/TOTAL GAS SETTING VFR VENT: ABNORMAL %
OXYHGB MFR BLDV: 46 %
OXYHGB MFR BLDV: 55 %
PCO2 BLDV: 32 MM HG (ref 40–50)
PCO2 BLDV: 39 MM HG (ref 40–50)
PH BLDV: 7.45 PH (ref 7.32–7.43)
PH BLDV: 7.48 PH (ref 7.32–7.43)
PHOSPHATE SERPL-MCNC: 3.9 MG/DL (ref 2.5–4.5)
PLATELET # BLD AUTO: 274 10E9/L (ref 150–450)
PO2 BLDV: 28 MM HG (ref 25–47)
PO2 BLDV: 32 MM HG (ref 25–47)
POTASSIUM SERPL-SCNC: 4.8 MMOL/L (ref 3.4–5.3)
RBC # BLD AUTO: 2.85 10E12/L (ref 4.4–5.9)
SODIUM SERPL-SCNC: 139 MMOL/L (ref 133–144)
WBC # BLD AUTO: 8.9 10E9/L (ref 4–11)

## 2018-05-29 PROCEDURE — 25000131 ZZH RX MED GY IP 250 OP 636 PS 637: Performed by: STUDENT IN AN ORGANIZED HEALTH CARE EDUCATION/TRAINING PROGRAM

## 2018-05-29 PROCEDURE — 25000132 ZZH RX MED GY IP 250 OP 250 PS 637: Performed by: STUDENT IN AN ORGANIZED HEALTH CARE EDUCATION/TRAINING PROGRAM

## 2018-05-29 PROCEDURE — 25000125 ZZHC RX 250: Performed by: STUDENT IN AN ORGANIZED HEALTH CARE EDUCATION/TRAINING PROGRAM

## 2018-05-29 PROCEDURE — 80048 BASIC METABOLIC PNL TOTAL CA: CPT | Performed by: NURSE PRACTITIONER

## 2018-05-29 PROCEDURE — 25000132 ZZH RX MED GY IP 250 OP 250 PS 637: Performed by: INTERNAL MEDICINE

## 2018-05-29 PROCEDURE — 25000132 ZZH RX MED GY IP 250 OP 250 PS 637: Mod: GY | Performed by: INTERNAL MEDICINE

## 2018-05-29 PROCEDURE — 63400005 ZZH RX 634: Performed by: NURSE PRACTITIONER

## 2018-05-29 PROCEDURE — 20000004 ZZH R&B ICU UMMC

## 2018-05-29 PROCEDURE — 40000048 ZZH STATISTIC DAILY SWAN MONITORING

## 2018-05-29 PROCEDURE — 82805 BLOOD GASES W/O2 SATURATION: CPT | Performed by: INTERNAL MEDICINE

## 2018-05-29 PROCEDURE — 99232 SBSQ HOSP IP/OBS MODERATE 35: CPT | Mod: GC | Performed by: INTERNAL MEDICINE

## 2018-05-29 PROCEDURE — 85027 COMPLETE CBC AUTOMATED: CPT | Performed by: NURSE PRACTITIONER

## 2018-05-29 PROCEDURE — 25000128 H RX IP 250 OP 636: Performed by: STUDENT IN AN ORGANIZED HEALTH CARE EDUCATION/TRAINING PROGRAM

## 2018-05-29 PROCEDURE — A9270 NON-COVERED ITEM OR SERVICE: HCPCS | Mod: GY | Performed by: NURSE PRACTITIONER

## 2018-05-29 PROCEDURE — A9270 NON-COVERED ITEM OR SERVICE: HCPCS | Mod: GY | Performed by: INTERNAL MEDICINE

## 2018-05-29 PROCEDURE — 25000132 ZZH RX MED GY IP 250 OP 250 PS 637: Performed by: NURSE PRACTITIONER

## 2018-05-29 PROCEDURE — 40000196 ZZH STATISTIC RAPCV CVP MONITORING

## 2018-05-29 PROCEDURE — 83735 ASSAY OF MAGNESIUM: CPT | Performed by: NURSE PRACTITIONER

## 2018-05-29 PROCEDURE — 94640 AIRWAY INHALATION TREATMENT: CPT | Mod: 76

## 2018-05-29 PROCEDURE — 84100 ASSAY OF PHOSPHORUS: CPT | Performed by: NURSE PRACTITIONER

## 2018-05-29 PROCEDURE — 25000128 H RX IP 250 OP 636: Performed by: NURSE PRACTITIONER

## 2018-05-29 PROCEDURE — 25000132 ZZH RX MED GY IP 250 OP 250 PS 637: Mod: GY | Performed by: NURSE PRACTITIONER

## 2018-05-29 PROCEDURE — A9270 NON-COVERED ITEM OR SERVICE: HCPCS | Mod: GY | Performed by: STUDENT IN AN ORGANIZED HEALTH CARE EDUCATION/TRAINING PROGRAM

## 2018-05-29 PROCEDURE — 00000146 ZZHCL STATISTIC GLUCOSE BY METER IP

## 2018-05-29 PROCEDURE — 90937 HEMODIALYSIS REPEATED EVAL: CPT

## 2018-05-29 RX ORDER — HEPARIN SODIUM 1000 [USP'U]/ML
500 INJECTION, SOLUTION INTRAVENOUS; SUBCUTANEOUS CONTINUOUS
Status: DISCONTINUED | OUTPATIENT
Start: 2018-05-29 | End: 2018-05-29

## 2018-05-29 RX ORDER — HEPARIN SODIUM 1000 [USP'U]/ML
500 INJECTION, SOLUTION INTRAVENOUS; SUBCUTANEOUS
Status: COMPLETED | OUTPATIENT
Start: 2018-05-29 | End: 2018-05-29

## 2018-05-29 RX ADMIN — ATORVASTATIN CALCIUM 40 MG: 40 TABLET, FILM COATED ORAL at 19:31

## 2018-05-29 RX ADMIN — ALBUTEROL SULFATE 2.5 MG: 2.5 SOLUTION RESPIRATORY (INHALATION) at 23:02

## 2018-05-29 RX ADMIN — ALLOPURINOL 100 MG: 100 TABLET ORAL at 07:43

## 2018-05-29 RX ADMIN — OMEPRAZOLE 20 MG: 20 CAPSULE, DELAYED RELEASE ORAL at 19:30

## 2018-05-29 RX ADMIN — SODIUM CHLORIDE 300 ML: 9 INJECTION, SOLUTION INTRAVENOUS at 11:49

## 2018-05-29 RX ADMIN — HEPARIN SODIUM 2100 UNITS: 1000 INJECTION, SOLUTION INTRAVENOUS; SUBCUTANEOUS at 14:43

## 2018-05-29 RX ADMIN — INSULIN ASPART: 100 INJECTION, SOLUTION INTRAVENOUS; SUBCUTANEOUS at 13:39

## 2018-05-29 RX ADMIN — FLUTICASONE PROPIONATE 2 SPRAY: 50 SPRAY, METERED NASAL at 19:31

## 2018-05-29 RX ADMIN — DOPAMINE HYDROCHLORIDE IN DEXTROSE 2.5 MCG/KG/MIN: 1.6 INJECTION, SOLUTION INTRAVENOUS at 16:45

## 2018-05-29 RX ADMIN — VITAMIN B12 0.1 MG ORAL TABLET 100 MCG: 0.1 TABLET ORAL at 07:43

## 2018-05-29 RX ADMIN — HYDRALAZINE HYDROCHLORIDE 10 MG: 10 TABLET, FILM COATED ORAL at 19:30

## 2018-05-29 RX ADMIN — Medication 1 CAPSULE: at 07:43

## 2018-05-29 RX ADMIN — INSULIN GLARGINE 10 UNITS: 100 INJECTION, SOLUTION SUBCUTANEOUS at 07:41

## 2018-05-29 RX ADMIN — PREDNISONE 5 MG: 5 TABLET ORAL at 07:43

## 2018-05-29 RX ADMIN — Medication 100 MG: at 07:43

## 2018-05-29 RX ADMIN — HEPARIN SODIUM 500 UNITS: 1000 INJECTION, SOLUTION INTRAVENOUS; SUBCUTANEOUS at 11:45

## 2018-05-29 RX ADMIN — SENNOSIDES AND DOCUSATE SODIUM 1 TABLET: 8.6; 5 TABLET ORAL at 07:43

## 2018-05-29 RX ADMIN — IRON SUCROSE 50 MG: 20 INJECTION, SOLUTION INTRAVENOUS at 11:45

## 2018-05-29 RX ADMIN — EPOETIN ALFA 4000 UNITS: 10000 SOLUTION INTRAVENOUS; SUBCUTANEOUS at 11:46

## 2018-05-29 RX ADMIN — SODIUM CHLORIDE 250 ML: 9 INJECTION, SOLUTION INTRAVENOUS at 11:48

## 2018-05-29 RX ADMIN — HEPARIN SODIUM 2100 UNITS: 1000 INJECTION, SOLUTION INTRAVENOUS; SUBCUTANEOUS at 14:42

## 2018-05-29 RX ADMIN — HEPARIN SODIUM 500 UNITS/HR: 1000 INJECTION, SOLUTION INTRAVENOUS; SUBCUTANEOUS at 11:44

## 2018-05-29 RX ADMIN — HYDRALAZINE HYDROCHLORIDE 10 MG: 10 TABLET, FILM COATED ORAL at 15:00

## 2018-05-29 RX ADMIN — ASPIRIN 81 MG CHEWABLE TABLET 81 MG: 81 TABLET CHEWABLE at 19:30

## 2018-05-29 RX ADMIN — POLYETHYLENE GLYCOL 3350 17 G: 17 POWDER, FOR SOLUTION ORAL at 07:42

## 2018-05-29 RX ADMIN — CETIRIZINE HYDROCHLORIDE 10 MG: 10 TABLET, FILM COATED ORAL at 07:43

## 2018-05-29 NOTE — PROGRESS NOTES
HEMODIALYSIS TREATMENT NOTE    Date: 5/29/2018  Time: 4:50 PM    Data:  Pre Wt: 80 kg (176 lb 5.9 oz)   Desired Wt: 75.5 kg   Post Wt: 77.2 kg (170 lb 1.4 oz)  Weight gain: -2.85 kg   Weight change: 2.85 kg  Ultrafiltration - Post Run Net Total Removed (mL): 3500 mL  Ultrafiltration - Post Run Net Total Gain (mL): 0 mL  Vascular Access Status: Yes, secured and visible  Dialyzer Rinse: Streaked, Light  Total Blood Volume Processed: 74.2  Total Dialysis (Treatment) Time:  3.5 hours    Lab:   No    Interventions:  Completed 3.5 hour HD tx via left tunneled IJ without complications.  Challenging dry wt today, 4.5kg above EDW.  Per pt and nephrology, goal for 3.5L UF today and get to new target wt of 75.5kg at next tx. 3.5L fluid removed, pt ate while on HD and had large, loose BM.  Post standing wt 77.2kg.  Heparin, venofer and epogen administered via HD machine per order.  CVC patent and functioning well, heparin locked per order.  CVC dressing appears CDI.  Tolerated HD and UF well, no interventions needed, VSS throughout tx.    Assessment:  Pt alert and oriented, calm and cooperative with cares.     Plan:    Per renal team, next HD tx Thursday 5/31/18.

## 2018-05-29 NOTE — PROGRESS NOTES
Nephrology Progress Note  05/29/2018         Assessment & Recommendations:   Murray Nicholson is a 63 yr old male with PMH of HTN, DMII, functionally bicuspid aortic valve, CHF/CM (EF 10-15%), ESRD, admitted with worsening dyspnea/SOB, now on dobutamine and dopamine pending heart/kidney transplant.       ESKD: due to DMII, on PD since Aug 2017, changed to iHD 1/2018 due to polymicrobial and fungal peritonitis, now on TTS schedule at [facility] under care of Dr Mcpherson. Access: tunneled RIJ (replaced 4/17/18). Run time: 4 hrs; EDW 75.5 kg.  - Dialysis per TTS schedule   - Using low dose heparin on HD  - Heparin lock CVC        Volume: EDW 76 kg. RHC 5/1 (done at 77 kg): RA 3, PCW 15; CVP 7   - Much prefer standing weights if possible (appreciate ICU obtaining pt's own scale!)  - Will reduce EDW to 75.5 kg as pt was still having SOB at 76 kg.      Severe AV and MR insufficiency:   BP/congestive CM (EF 10-15%); minimal CAD per angio on 2/8/17. Chronically hypotensive with tachycardia  - Goal MAP > 65 and SBP > 95 while dialyzing  - Listed for heart/kidney tx, in ICU on dobutamine and dopamine         Anemia: hgb 8.3, labs 5/8: ferritin 621, IS 27, iron 58; on maintenance venofer 50 mg qTuesday   - Continue venofer qTues  - On epogen 4000 units per HD      BMD: iCa 5.3, phos 3.9; recent ; Vit D 27; on hectorol 0.5 mcg via HD; on phoslo 1 tab TID with meals.   - Hectorol has been stopped  - Phoslo has been stopped     Recommendations were communicated to primary team via this note.       Kristi Armas PA-C  Division of Kidney Disease  Pager [phone]      Interval History :   Seen at bedside. Doing well. . Remains in ICU in DB drip.  Denies CP, SOB, chills, n/v.    Review of Systems:   4 point ROS negative other than as noted above.    Physical Exam:   I/O last 3 completed shifts:  In: 966.4 [P.O.:580; I.V.:386.4]  Out: 20 [Urine:20]   /85  Pulse 107  Temp 98  F (36.7  C) (Oral)  Resp 16  Ht 1.727  "m (5' 8\")  Wt 80 kg (176 lb 5.9 oz)  SpO2 100%  BMI 26.82 kg/m2     GENERAL APPEARANCE: alert, NAD  EYES: no scleral icterus, pupils equal   Pulmonary: lungs clear to auscultation with equal breath sounds bilaterally; has swan  CV: regular rhythm, tachycardia at baseline   - Edema trace  GI: soft, nontender, normal bowel sounds  MS: no evidence of inflammation in joints, no muscle tenderness  : no doyle  SKIN: no rash, warm, dry, no cyanosis  NEURO: face symmetric, no asterixis   Access: tunneled [facility]       Labs:   All labs reviewed by me  Electrolytes/Renal -   Recent Labs   Lab Test  05/29/18   0434  05/28/18   0443  05/27/18   0345  05/26/18   1632  05/26/18   0424  05/25/18 2122   NA  139  136   --    --   138   --    POTASSIUM  4.8  4.3   --   4.0  3.5  4.2   CHLORIDE  101  99   --    --   102   --    CO2  25  27   --    --   27   --    BUN  66*  51*   --    --   40*   --    CR  9.50*  7.36*   --    --   6.35*   --    GLC  101*  149*   --    --   108*   --    TRINI  9.1  8.8   --    --   8.3*   --    MAG  2.2   --   2.4*  1.7  2.0  2.0   PHOS  3.9   --    --   3.5   --   1.9*       CBC -   Recent Labs   Lab Test  05/29/18   0434  05/28/18   0443  05/26/18   0424   WBC  8.9  8.0  9.7   HGB  8.3*  8.5*  8.5*   PLT  274  274  261       LFTs -   Recent Labs   Lab Test  04/16/18   1546  03/07/18   0833  02/19/18   1558  02/02/18   1736   ALKPHOS  123  129  102  86   BILITOTAL  0.8  0.7  0.6  0.4   ALT  22  21  15  16   AST  17  18  18  20   PROTTOTAL  7.4   --   7.2  6.2*   ALBUMIN  3.5   --   2.7*  2.1*       Iron Panel -   Recent Labs   Lab Test  05/08/18   0954  07/19/17   1306  07/05/17   1204   IRON  58  46  26*   IRONSAT  27  18  12*   ALBERTINA  621*  369  542*         Imaging:  Reviewed    Current Medications:    albuterol  2.5 mg Nebulization At Bedtime     allopurinol  100 mg Oral Daily     aspirin  81 mg Oral Daily     atorvastatin  40 mg Oral Daily     cetirizine  10 mg Oral Daily     cyanocolbalamin  100 " mcg Oral Daily     fluticasone  1-2 spray Both Nostrils Daily     gelatin absorbable  1 each Topical During Hemodialysis (from stock)     heparin  3 mL Intracatheter During Hemodialysis (from stock)     heparin  3 mL Intracatheter During Hemodialysis (from stock)     hydrALAZINE  25 mg Oral 4 times per day on Sun Mon Wed Fri    And     hydrALAZINE  10 mg Oral 2 times per day on Tue Thu Sat     insulin aspart   Subcutaneous QAM AC     insulin aspart   Subcutaneous Daily with lunch     insulin aspart   Subcutaneous Daily with supper     insulin aspart  1-10 Units Subcutaneous TID AC     insulin aspart  1-7 Units Subcutaneous At Bedtime     insulin glargine  10 Units Subcutaneous QAM AC     isosorbide dinitrate  10 mg Oral 3 times per day on Sun Mon Wed Fri     NEPHROCAPS  1 capsule Oral Daily     omeprazole  20 mg Oral Daily     polyethylene glycol  17 g Oral Daily     predniSONE  5 mg Oral Daily     senna-docusate  1 tablet Oral BID     sodium chloride (PF)  10 mL Intracatheter Q7 Days     sodium chloride (PF)  3 mL Intracatheter During Hemodialysis (from stock)     sodium chloride (PF)  3 mL Intracatheter During Hemodialysis (from stock)     sodium chloride (PF)  3 mL Intracatheter Q8H     vitamin  B-1  100 mg Oral Daily       DOBUTamine 2.5 mcg/kg/min (05/29/18 1300)     DOPamine 2.5 mcg/kg/min (05/29/18 1300)     heparin (porcine) 500 Units/hr (05/29/18 1144)     - MEDICATION INSTRUCTIONS -       Reason ACE/ARB/ARNI order not selected       Reason beta blocker order not selected       Kristi Armas PA-C

## 2018-05-29 NOTE — PLAN OF CARE
Problem: Cardiac: Heart Failure (Adult)  Goal: Signs and Symptoms of Listed Potential Problems Will be Absent, Minimized or Managed (Cardiac: Heart Failure)  Signs and symptoms of listed potential problems will be absent, minimized or managed by discharge/transition of care (reference Cardiac: Heart Failure (Adult) CPG).   Outcome: No Change  Pt a/o x4. Rested for most of shift. 0400 BECKA: PA: 56/38, CVP: 14, SVO2: 55, CI: 2.5, CO: 4.8, SVR: 1189. Dopamine & Dobutamine remain at 2.5. Small BM x 1. Plan for HD in AM. Will continue to monitor and notify team with concerns.

## 2018-05-29 NOTE — PROGRESS NOTES
Advanced Heart Failure and Transplant Cardiology  Progress Note:    Assessment and Plan:  63 year old male with a PMH of CAD, ICM (EF of 15-20%), ESRD, bicuspid aortic valve with AI, severe MR, and proximal AAA who was admitted for decompensated heart failure. He is on dobutamine 2.5 mcg/kg/min and listed for heart/kidney transplant as status 1A.    Major Changes:  - none    # Stage D NYHA Class III systolic heart failure secondary to ICM, listed 1A  # Severe mitral regurgitation  # Severe holodiastolic aortic insufficiency   - continue dobutamine 2.5 mcg/kg/min, dopamine 2.5 mcg/kg/min  - watch NSVT carefully and consider suppressant if continues / worsens  - hydralazine 25 mg q6 hours on non-dialysis days + 10 mg BID on dialysis days (after HD)  - isordil 10 mg TID on non-HD days  - weekly CXR (next 6/4/18) for [place] position  - hemodynamics remain stable  - nephrology challenging dry weight    # Non-sustained ventricular tachycardia:  - watching carefully in setting of inotropes  - will add suppressant if worsens / prolonged episodes    # Gout:  - prednisone taper through 5/31       # Chronic Medical Issues:  - Seborrheic keratoses / Dermatofibromas / Multiple benign nevi / Nummular dermatitis: derm consulted, all lesions benign  - Hx of allergic rhinitis: cetrizine  - ESRD: Dialysis T, Th, Sa   - Non-obstructive CAD, Anomalous RCA: ASA 81 mg, atorvastatin 40 mg daily  - Ascending aortic aneurysm: 4.5 x 4.5 cm proximal ascending aortic aneurysm seen on MRI 2/14  - Multiple benign branch duct-IPMNs: no concerning features affecting transplant candidacy      FEN: regular diet, 2L FR  PROPHY: ambulation  LINES: PICC  DISPO: TBD  CODE STATUS:  Full    Sukumar Mejía, CVD Fellow  Seen and discussed with Dr. Minaya.    ================================================================  Subjective/24-Hr Events:   - no acute issues overnight    ROS:  4 point ROS including respiratory, CV, GI and  (other than that noted in  "the HPI) is negative.     Medications: Reviewed in EPIC.     Physical Exam:   /73  Pulse 107  Temp 97.7  F (36.5  C) (Oral)  Resp 16  Ht 1.727 m (5' 8\")  Wt 80 kg (176 lb 5.9 oz)  SpO2 98%  BMI 26.82 kg/m2    GENERAL: A&O x 3, NAD  NECK: no JVD, L-sided [place] in place bandaged and c/d/i  CV: Mildly tachycardic, +S1S2, soft murmur across precordium  RESPIRATORY: CTAB including bases  GI: soft, NT, ND  EXTREMITIES: trace pretibial edema, 2+ bilateral pedal pulses. Warm.   NEUROLOGIC: Alert and oriented x 3. No focal deficits.   SKIN: No jaundice. No rashes or lesions.     Labs / Imaging:  - all labs and imaging reviewed  "

## 2018-05-29 NOTE — PLAN OF CARE
Problem: Patient Care Overview  Goal: Plan of Care/Patient Progress Review  Outcome: No Change  Patient remained stable today. Sinus Tachy 100-110s. MAPs >65. Afebrile. RA/2L NC for comfort. Lung sounds clear. A&Ox4. Up and ambulating independently. HD today- 3.5L removed. On Dopamine and Dobutamine drips. BECKA numbers: CVP 16, PA 54/38, SVO2 46, CO 3.9, CI 2.1, SVR 1300. Regular diet with 2L FR. Anuric. 2 bm's today. No complaints of pain. Will continue to monitor and update MDs with any changes.

## 2018-05-30 ENCOUNTER — APPOINTMENT (OUTPATIENT)
Dept: OCCUPATIONAL THERAPY | Facility: CLINIC | Age: 63
DRG: 001 | End: 2018-05-30
Attending: INTERNAL MEDICINE
Payer: COMMERCIAL

## 2018-05-30 LAB
ANION GAP SERPL CALCULATED.3IONS-SCNC: 8 MMOL/L (ref 3–14)
BASE DEFICIT BLDV-SCNC: 0.7 MMOL/L
BASE EXCESS BLDV CALC-SCNC: 4.1 MMOL/L
BUN SERPL-MCNC: 39 MG/DL (ref 7–30)
CALCIUM SERPL-MCNC: 8.8 MG/DL (ref 8.5–10.1)
CHLORIDE SERPL-SCNC: 99 MMOL/L (ref 94–109)
CO2 SERPL-SCNC: 28 MMOL/L (ref 20–32)
CREAT SERPL-MCNC: 5.95 MG/DL (ref 0.66–1.25)
ERYTHROCYTE [DISTWIDTH] IN BLOOD BY AUTOMATED COUNT: 16.7 % (ref 10–15)
GFR SERPL CREATININE-BSD FRML MDRD: 10 ML/MIN/1.7M2
GLUCOSE BLDC GLUCOMTR-MCNC: 117 MG/DL (ref 70–99)
GLUCOSE BLDC GLUCOMTR-MCNC: 159 MG/DL (ref 70–99)
GLUCOSE BLDC GLUCOMTR-MCNC: 164 MG/DL (ref 70–99)
GLUCOSE BLDC GLUCOMTR-MCNC: 181 MG/DL (ref 70–99)
GLUCOSE SERPL-MCNC: 151 MG/DL (ref 70–99)
HCO3 BLDV-SCNC: 23 MMOL/L (ref 21–28)
HCO3 BLDV-SCNC: 28 MMOL/L (ref 21–28)
HCT VFR BLD AUTO: 27.5 % (ref 40–53)
HGB BLD-MCNC: 8.9 G/DL (ref 13.3–17.7)
MAGNESIUM SERPL-MCNC: 1.7 MG/DL (ref 1.6–2.3)
MCH RBC QN AUTO: 29.3 PG (ref 26.5–33)
MCHC RBC AUTO-ENTMCNC: 32.4 G/DL (ref 31.5–36.5)
MCV RBC AUTO: 91 FL (ref 78–100)
O2/TOTAL GAS SETTING VFR VENT: 21 %
O2/TOTAL GAS SETTING VFR VENT: ABNORMAL %
OXYHGB MFR BLDV: 51 %
OXYHGB MFR BLDV: 51 %
PCO2 BLDV: 32 MM HG (ref 40–50)
PCO2 BLDV: 41 MM HG (ref 40–50)
PH BLDV: 7.45 PH (ref 7.32–7.43)
PH BLDV: 7.47 PH (ref 7.32–7.43)
PLATELET # BLD AUTO: 300 10E9/L (ref 150–450)
PO2 BLDV: 30 MM HG (ref 25–47)
PO2 BLDV: 31 MM HG (ref 25–47)
POTASSIUM SERPL-SCNC: 4.2 MMOL/L (ref 3.4–5.3)
RBC # BLD AUTO: 3.04 10E12/L (ref 4.4–5.9)
SODIUM SERPL-SCNC: 135 MMOL/L (ref 133–144)
WBC # BLD AUTO: 8.3 10E9/L (ref 4–11)

## 2018-05-30 PROCEDURE — 40000275 ZZH STATISTIC RCP TIME EA 10 MIN

## 2018-05-30 PROCEDURE — 40000133 ZZH STATISTIC OT WARD VISIT: Performed by: OCCUPATIONAL THERAPIST

## 2018-05-30 PROCEDURE — A9270 NON-COVERED ITEM OR SERVICE: HCPCS | Mod: GY | Performed by: INTERNAL MEDICINE

## 2018-05-30 PROCEDURE — A9270 NON-COVERED ITEM OR SERVICE: HCPCS | Mod: GY | Performed by: STUDENT IN AN ORGANIZED HEALTH CARE EDUCATION/TRAINING PROGRAM

## 2018-05-30 PROCEDURE — 25000132 ZZH RX MED GY IP 250 OP 250 PS 637: Performed by: NURSE PRACTITIONER

## 2018-05-30 PROCEDURE — 25000125 ZZHC RX 250: Performed by: STUDENT IN AN ORGANIZED HEALTH CARE EDUCATION/TRAINING PROGRAM

## 2018-05-30 PROCEDURE — 00000146 ZZHCL STATISTIC GLUCOSE BY METER IP

## 2018-05-30 PROCEDURE — 99232 SBSQ HOSP IP/OBS MODERATE 35: CPT | Mod: GC | Performed by: INTERNAL MEDICINE

## 2018-05-30 PROCEDURE — 80048 BASIC METABOLIC PNL TOTAL CA: CPT | Performed by: INTERNAL MEDICINE

## 2018-05-30 PROCEDURE — 20000004 ZZH R&B ICU UMMC

## 2018-05-30 PROCEDURE — 97110 THERAPEUTIC EXERCISES: CPT | Mod: GO | Performed by: OCCUPATIONAL THERAPIST

## 2018-05-30 PROCEDURE — 25000132 ZZH RX MED GY IP 250 OP 250 PS 637: Mod: GY | Performed by: STUDENT IN AN ORGANIZED HEALTH CARE EDUCATION/TRAINING PROGRAM

## 2018-05-30 PROCEDURE — 25000132 ZZH RX MED GY IP 250 OP 250 PS 637: Mod: GY | Performed by: INTERNAL MEDICINE

## 2018-05-30 PROCEDURE — 82805 BLOOD GASES W/O2 SATURATION: CPT | Performed by: INTERNAL MEDICINE

## 2018-05-30 PROCEDURE — 83735 ASSAY OF MAGNESIUM: CPT | Performed by: INTERNAL MEDICINE

## 2018-05-30 PROCEDURE — A9270 NON-COVERED ITEM OR SERVICE: HCPCS | Mod: GY | Performed by: NURSE PRACTITIONER

## 2018-05-30 PROCEDURE — 94640 AIRWAY INHALATION TREATMENT: CPT | Mod: 76

## 2018-05-30 PROCEDURE — 40000048 ZZH STATISTIC DAILY SWAN MONITORING

## 2018-05-30 PROCEDURE — 85027 COMPLETE CBC AUTOMATED: CPT | Performed by: INTERNAL MEDICINE

## 2018-05-30 PROCEDURE — 40000014 ZZH STATISTIC ARTERIAL MONITORING DAILY

## 2018-05-30 RX ADMIN — VITAMIN B12 0.1 MG ORAL TABLET 100 MCG: 0.1 TABLET ORAL at 08:30

## 2018-05-30 RX ADMIN — SIMETHICONE CHEW TAB 80 MG 80 MG: 80 TABLET ORAL at 00:11

## 2018-05-30 RX ADMIN — OMEPRAZOLE 20 MG: 20 CAPSULE, DELAYED RELEASE ORAL at 19:43

## 2018-05-30 RX ADMIN — ALLOPURINOL 100 MG: 100 TABLET ORAL at 08:30

## 2018-05-30 RX ADMIN — FLUTICASONE PROPIONATE 2 SPRAY: 50 SPRAY, METERED NASAL at 19:46

## 2018-05-30 RX ADMIN — ATORVASTATIN CALCIUM 40 MG: 40 TABLET, FILM COATED ORAL at 19:43

## 2018-05-30 RX ADMIN — ISOSORBIDE DINITRATE 10 MG: 10 TABLET ORAL at 13:52

## 2018-05-30 RX ADMIN — Medication 25 MG: at 18:01

## 2018-05-30 RX ADMIN — PREDNISONE 5 MG: 5 TABLET ORAL at 08:30

## 2018-05-30 RX ADMIN — ASPIRIN 81 MG CHEWABLE TABLET 81 MG: 81 TABLET CHEWABLE at 19:43

## 2018-05-30 RX ADMIN — ISOSORBIDE DINITRATE 10 MG: 10 TABLET ORAL at 08:36

## 2018-05-30 RX ADMIN — INSULIN GLARGINE 10 UNITS: 100 INJECTION, SOLUTION SUBCUTANEOUS at 08:32

## 2018-05-30 RX ADMIN — INSULIN ASPART 1 UNITS: 100 INJECTION, SOLUTION INTRAVENOUS; SUBCUTANEOUS at 13:53

## 2018-05-30 RX ADMIN — ISOSORBIDE DINITRATE 10 MG: 10 TABLET ORAL at 19:50

## 2018-05-30 RX ADMIN — INSULIN ASPART 3 UNITS: 100 INJECTION, SOLUTION INTRAVENOUS; SUBCUTANEOUS at 14:43

## 2018-05-30 RX ADMIN — Medication 1 CAPSULE: at 08:30

## 2018-05-30 RX ADMIN — INSULIN ASPART 1 UNITS: 100 INJECTION, SOLUTION INTRAVENOUS; SUBCUTANEOUS at 19:06

## 2018-05-30 RX ADMIN — Medication 25 MG: at 08:36

## 2018-05-30 RX ADMIN — Medication 100 MG: at 08:30

## 2018-05-30 RX ADMIN — Medication 25 MG: at 13:02

## 2018-05-30 RX ADMIN — CETIRIZINE HYDROCHLORIDE 10 MG: 10 TABLET, FILM COATED ORAL at 08:30

## 2018-05-30 RX ADMIN — Medication 25 MG: at 22:01

## 2018-05-30 RX ADMIN — SIMETHICONE CHEW TAB 80 MG 80 MG: 80 TABLET ORAL at 07:45

## 2018-05-30 RX ADMIN — ALBUTEROL SULFATE 2.5 MG: 2.5 SOLUTION RESPIRATORY (INHALATION) at 22:06

## 2018-05-30 RX ADMIN — BISMUTH SUBSALICYLATE 15 ML: 262 LIQUID ORAL at 07:45

## 2018-05-30 RX ADMIN — SIMETHICONE CHEW TAB 80 MG 80 MG: 80 TABLET ORAL at 19:50

## 2018-05-30 NOTE — PLAN OF CARE
Problem: Cardiac: Heart Failure (Adult)  Goal: Signs and Symptoms of Listed Potential Problems Will be Absent, Minimized or Managed (Cardiac: Heart Failure)  Signs and symptoms of listed potential problems will be absent, minimized or managed by discharge/transition of care (reference Cardiac: Heart Failure (Adult) CPG).   Outcome: No Change  Pt a/o x 4. C/o gas pain, Simethicone PRN with some relief. -115's, BECKA: PA: 50/30, CVP: 11, CI: 2.1, CO:4, SVR: 1200's. SVO2: 51. Anuric, no BM this shift. Will continue to monitor and notify team with concerns.

## 2018-05-30 NOTE — PLAN OF CARE
Problem: Patient Care Overview  Goal: Plan of Care/Patient Progress Review  Outcome: No Change  Patient remained stable today. Sinus Tachy 100-110s. MAPs >65. Afebrile. RA/2L NC for comfort. Lung sounds clear. A&Ox4. Up and ambulating independently- went for walk around the 4th floor this am and tolerated well. On Dopamine and Dobutamine drips. BECKA numbers: CVP 10, PA 46/34, SVO2 51, CO 4.1, CI 2.1, SVR 1573. Regular diet with 2L FR. Anuric. 1 bm today. No complaints of pain. Remains status 1A. Will continue to monitor and update MDs with any changes.

## 2018-05-30 NOTE — PROGRESS NOTES
"  Advanced Heart Failure and Transplant Cardiology  Progress Note:    Assessment and Plan:  63 year old male with a PMH of CAD, ICM (EF of 15-20%), ESRD, bicuspid aortic valve with AI, severe MR, and proximal AAA who was admitted for decompensated heart failure. He is on dobutamine 2.5 mcg/kg/min and listed for heart/kidney transplant as status 1A.    Major Changes:  - none    # Stage D NYHA Class III systolic heart failure secondary to ICM, listed 1A  # Severe mitral regurgitation  # Severe holodiastolic aortic insufficiency   # Non-sustained ventricular tachycardia (minimal)  - dobutamine 2.5 mcg/kg/min, dopamine 2.5 mcg/kg/min  - hydralazine 25 mg q6 hours on non-dialysis days + 10 mg BID on dialysis days (after HD)  - isordil 10 mg TID on non-HD days  - weekly CXR (next 6/4/18) for [place] position  - nephrology challenging dry weight    # Gout:  - prednisone taper through 5/31       # Chronic Medical Issues:  - Seborrheic keratoses / Dermatofibromas / Multiple benign nevi / Nummular dermatitis: derm consulted, all lesions benign  - Hx of allergic rhinitis: cetrizine  - ESRD: Dialysis T, Th, Sa   - Non-obstructive CAD, Anomalous RCA: ASA 81 mg, atorvastatin 40 mg daily  - Ascending aortic aneurysm: 4.5 x 4.5 cm proximal ascending aortic aneurysm seen on MRI 2/14  - Multiple benign branch duct-IPMNs: no concerning features affecting transplant candidacy      FEN: regular diet, 2L FR  PROPHY: ambulation  LINES: PICC  DISPO: TBD  CODE STATUS:  Full    Sukumar Mejía, CVD Fellow  Seen and discussed with Dr. Minaya.    ================================================================  Subjective/24-Hr Events:   - no acute issues overnight    ROS:  4 point ROS including respiratory, CV, GI and  (other than that noted in the HPI) is negative.     Medications: Reviewed in EPIC.     Physical Exam:   BP 97/65 (BP Location: Right arm)  Pulse 107  Temp 98.3  F (36.8  C) (Oral)  Resp 14  Ht 1.727 m (5' 8\")  Wt 77.6 kg (171 lb " 1.2 oz)  SpO2 98%  BMI 26.01 kg/m2    GENERAL: A&O x 3, NAD  NECK: no JVD, L-sided [place] in place bandaged and c/d/i  CV: Mildly tachycardic, +S1S2, soft murmur across precordium  RESPIRATORY: CTAB including bases  GI: soft, NT, ND  EXTREMITIES: trace pretibial edema, 2+ bilateral pedal pulses. Warm.   NEUROLOGIC: Alert and oriented x 3. No focal deficits.   SKIN: No jaundice. No rashes or lesions.     Labs / Imaging:  - all labs and imaging reviewed

## 2018-05-30 NOTE — PLAN OF CARE
Problem: Patient Care Overview  Goal: Plan of Care/Patient Progress Review  OT/CR 6C  Discharge Planner OT   Patient plan for discharge: Pt is hopeful to be able to return to home pending outcome of anticipated heart/kidney transplant  Current status: Pt ambulated for ~20 minutes at SBA, slight SOB, and no rest breaks. Pt completed 4 Le proximal muscle strengthening exercises x10 reps of each.  Barriers to return to prior living situation: acute medical needs, awaiting transplant  Recommendations for discharge: Currently pt would be appropriate for d/c to home with OP CR; will update post surgically  Rationale for recommendations: Pt is functionally safe to return to home at this time; would benefit from continued OP therapy to address conditioning and strength; however, pt anticipates remaining in the hospital until transplant. will require re-evaluation post surgically.       Entered by: Annmarie JOHNSON Retterath 05/30/2018 11:36 AM

## 2018-05-31 LAB
ANION GAP SERPL CALCULATED.3IONS-SCNC: 11 MMOL/L (ref 3–14)
BASE EXCESS BLDV CALC-SCNC: 2.3 MMOL/L
BASE EXCESS BLDV CALC-SCNC: 3.1 MMOL/L
BUN SERPL-MCNC: 55 MG/DL (ref 7–30)
CALCIUM SERPL-MCNC: 8.8 MG/DL (ref 8.5–10.1)
CHLORIDE SERPL-SCNC: 100 MMOL/L (ref 94–109)
CO2 SERPL-SCNC: 25 MMOL/L (ref 20–32)
CREAT SERPL-MCNC: 7.91 MG/DL (ref 0.66–1.25)
ERYTHROCYTE [DISTWIDTH] IN BLOOD BY AUTOMATED COUNT: 16.5 % (ref 10–15)
GFR SERPL CREATININE-BSD FRML MDRD: 7 ML/MIN/1.7M2
GLUCOSE BLDC GLUCOMTR-MCNC: 103 MG/DL (ref 70–99)
GLUCOSE BLDC GLUCOMTR-MCNC: 201 MG/DL (ref 70–99)
GLUCOSE BLDC GLUCOMTR-MCNC: 223 MG/DL (ref 70–99)
GLUCOSE SERPL-MCNC: 92 MG/DL (ref 70–99)
HCO3 BLDV-SCNC: 27 MMOL/L (ref 21–28)
HCO3 BLDV-SCNC: 27 MMOL/L (ref 21–28)
HCT VFR BLD AUTO: 26.6 % (ref 40–53)
HGB BLD-MCNC: 8.4 G/DL (ref 13.3–17.7)
MAGNESIUM SERPL-MCNC: 1.7 MG/DL (ref 1.6–2.3)
MCH RBC QN AUTO: 29.3 PG (ref 26.5–33)
MCHC RBC AUTO-ENTMCNC: 31.6 G/DL (ref 31.5–36.5)
MCV RBC AUTO: 93 FL (ref 78–100)
O2/TOTAL GAS SETTING VFR VENT: 21 %
O2/TOTAL GAS SETTING VFR VENT: 21 %
OXYHGB MFR BLDV: 53 %
OXYHGB MFR BLDV: 56 %
PCO2 BLDV: 40 MM HG (ref 40–50)
PCO2 BLDV: 41 MM HG (ref 40–50)
PH BLDV: 7.43 PH (ref 7.32–7.43)
PH BLDV: 7.45 PH (ref 7.32–7.43)
PLATELET # BLD AUTO: 249 10E9/L (ref 150–450)
PO2 BLDV: 32 MM HG (ref 25–47)
PO2 BLDV: 32 MM HG (ref 25–47)
POTASSIUM SERPL-SCNC: 4.3 MMOL/L (ref 3.4–5.3)
RBC # BLD AUTO: 2.87 10E12/L (ref 4.4–5.9)
SODIUM SERPL-SCNC: 136 MMOL/L (ref 133–144)
WBC # BLD AUTO: 7 10E9/L (ref 4–11)

## 2018-05-31 PROCEDURE — 40000014 ZZH STATISTIC ARTERIAL MONITORING DAILY

## 2018-05-31 PROCEDURE — 40000275 ZZH STATISTIC RCP TIME EA 10 MIN

## 2018-05-31 PROCEDURE — 25000132 ZZH RX MED GY IP 250 OP 250 PS 637: Performed by: NURSE PRACTITIONER

## 2018-05-31 PROCEDURE — A9270 NON-COVERED ITEM OR SERVICE: HCPCS | Mod: GY | Performed by: INTERNAL MEDICINE

## 2018-05-31 PROCEDURE — 85027 COMPLETE CBC AUTOMATED: CPT | Performed by: NURSE PRACTITIONER

## 2018-05-31 PROCEDURE — 25000132 ZZH RX MED GY IP 250 OP 250 PS 637: Mod: GY | Performed by: NURSE PRACTITIONER

## 2018-05-31 PROCEDURE — 25000132 ZZH RX MED GY IP 250 OP 250 PS 637: Mod: GY | Performed by: INTERNAL MEDICINE

## 2018-05-31 PROCEDURE — 25000125 ZZHC RX 250: Performed by: STUDENT IN AN ORGANIZED HEALTH CARE EDUCATION/TRAINING PROGRAM

## 2018-05-31 PROCEDURE — 40000196 ZZH STATISTIC RAPCV CVP MONITORING

## 2018-05-31 PROCEDURE — 99232 SBSQ HOSP IP/OBS MODERATE 35: CPT | Mod: GC | Performed by: INTERNAL MEDICINE

## 2018-05-31 PROCEDURE — 25000128 H RX IP 250 OP 636: Performed by: INTERNAL MEDICINE

## 2018-05-31 PROCEDURE — 00000146 ZZHCL STATISTIC GLUCOSE BY METER IP

## 2018-05-31 PROCEDURE — 40000048 ZZH STATISTIC DAILY SWAN MONITORING

## 2018-05-31 PROCEDURE — 83735 ASSAY OF MAGNESIUM: CPT | Performed by: NURSE PRACTITIONER

## 2018-05-31 PROCEDURE — A9270 NON-COVERED ITEM OR SERVICE: HCPCS | Mod: GY | Performed by: NURSE PRACTITIONER

## 2018-05-31 PROCEDURE — 63400005 ZZH RX 634: Performed by: INTERNAL MEDICINE

## 2018-05-31 PROCEDURE — A9270 NON-COVERED ITEM OR SERVICE: HCPCS | Mod: GY | Performed by: STUDENT IN AN ORGANIZED HEALTH CARE EDUCATION/TRAINING PROGRAM

## 2018-05-31 PROCEDURE — 90937 HEMODIALYSIS REPEATED EVAL: CPT

## 2018-05-31 PROCEDURE — 80048 BASIC METABOLIC PNL TOTAL CA: CPT | Performed by: NURSE PRACTITIONER

## 2018-05-31 PROCEDURE — 25000125 ZZHC RX 250: Performed by: NURSE PRACTITIONER

## 2018-05-31 PROCEDURE — 25000132 ZZH RX MED GY IP 250 OP 250 PS 637: Performed by: STUDENT IN AN ORGANIZED HEALTH CARE EDUCATION/TRAINING PROGRAM

## 2018-05-31 PROCEDURE — 82805 BLOOD GASES W/O2 SATURATION: CPT | Performed by: INTERNAL MEDICINE

## 2018-05-31 PROCEDURE — 25000128 H RX IP 250 OP 636: Performed by: STUDENT IN AN ORGANIZED HEALTH CARE EDUCATION/TRAINING PROGRAM

## 2018-05-31 PROCEDURE — 20000004 ZZH R&B ICU UMMC

## 2018-05-31 RX ORDER — HEPARIN SODIUM 1000 [USP'U]/ML
500 INJECTION, SOLUTION INTRAVENOUS; SUBCUTANEOUS
Status: COMPLETED | OUTPATIENT
Start: 2018-05-31 | End: 2018-05-31

## 2018-05-31 RX ORDER — HEPARIN SODIUM 1000 [USP'U]/ML
500 INJECTION, SOLUTION INTRAVENOUS; SUBCUTANEOUS CONTINUOUS
Status: DISCONTINUED | OUTPATIENT
Start: 2018-05-31 | End: 2018-05-31

## 2018-05-31 RX ADMIN — OMEPRAZOLE 20 MG: 20 CAPSULE, DELAYED RELEASE ORAL at 20:53

## 2018-05-31 RX ADMIN — DOBUTAMINE HYDROCHLORIDE 2.5 MCG/KG/MIN: 400 INJECTION INTRAVENOUS at 21:01

## 2018-05-31 RX ADMIN — HYDRALAZINE HYDROCHLORIDE 10 MG: 10 TABLET, FILM COATED ORAL at 20:53

## 2018-05-31 RX ADMIN — VITAMIN B12 0.1 MG ORAL TABLET 100 MCG: 0.1 TABLET ORAL at 08:22

## 2018-05-31 RX ADMIN — HEPARIN SODIUM 500 UNITS: 1000 INJECTION, SOLUTION INTRAVENOUS; SUBCUTANEOUS at 10:53

## 2018-05-31 RX ADMIN — SODIUM CHLORIDE 300 ML: 9 INJECTION, SOLUTION INTRAVENOUS at 10:52

## 2018-05-31 RX ADMIN — INSULIN ASPART 3 UNITS: 100 INJECTION, SOLUTION INTRAVENOUS; SUBCUTANEOUS at 20:54

## 2018-05-31 RX ADMIN — Medication 1 CAPSULE: at 08:22

## 2018-05-31 RX ADMIN — Medication 100 MG: at 08:22

## 2018-05-31 RX ADMIN — HYDRALAZINE HYDROCHLORIDE 10 MG: 10 TABLET, FILM COATED ORAL at 14:42

## 2018-05-31 RX ADMIN — DOPAMINE HYDROCHLORIDE IN DEXTROSE 2.5 MCG/KG/MIN: 1.6 INJECTION, SOLUTION INTRAVENOUS at 18:38

## 2018-05-31 RX ADMIN — INSULIN GLARGINE 10 UNITS: 100 INJECTION, SOLUTION SUBCUTANEOUS at 08:05

## 2018-05-31 RX ADMIN — HEPARIN SODIUM 500 UNITS/HR: 1000 INJECTION, SOLUTION INTRAVENOUS; SUBCUTANEOUS at 10:53

## 2018-05-31 RX ADMIN — ATORVASTATIN CALCIUM 40 MG: 40 TABLET, FILM COATED ORAL at 20:53

## 2018-05-31 RX ADMIN — INSULIN ASPART 4 UNITS: 100 INJECTION, SOLUTION INTRAVENOUS; SUBCUTANEOUS at 14:39

## 2018-05-31 RX ADMIN — HEPARIN SODIUM 3000 UNITS: 1000 INJECTION, SOLUTION INTRAVENOUS; SUBCUTANEOUS at 10:56

## 2018-05-31 RX ADMIN — HEPARIN SODIUM 500 UNITS/HR: 1000 INJECTION, SOLUTION INTRAVENOUS; SUBCUTANEOUS at 10:55

## 2018-05-31 RX ADMIN — Medication 2 G: at 10:04

## 2018-05-31 RX ADMIN — DOPAMINE HYDROCHLORIDE IN DEXTROSE 2.5 MCG/KG/MIN: 1.6 INJECTION, SOLUTION INTRAVENOUS at 05:08

## 2018-05-31 RX ADMIN — ASPIRIN 81 MG CHEWABLE TABLET 81 MG: 81 TABLET CHEWABLE at 20:53

## 2018-05-31 RX ADMIN — HEPARIN SODIUM 3000 UNITS: 1000 INJECTION, SOLUTION INTRAVENOUS; SUBCUTANEOUS at 10:55

## 2018-05-31 RX ADMIN — SODIUM CHLORIDE 250 ML: 9 INJECTION, SOLUTION INTRAVENOUS at 10:52

## 2018-05-31 RX ADMIN — EPOETIN ALFA 4000 UNITS: 10000 SOLUTION INTRAVENOUS; SUBCUTANEOUS at 10:57

## 2018-05-31 RX ADMIN — FLUTICASONE PROPIONATE 2 SPRAY: 50 SPRAY, METERED NASAL at 20:55

## 2018-05-31 RX ADMIN — PREDNISONE 5 MG: 5 TABLET ORAL at 08:22

## 2018-05-31 RX ADMIN — ALLOPURINOL 100 MG: 100 TABLET ORAL at 08:22

## 2018-05-31 RX ADMIN — CETIRIZINE HYDROCHLORIDE 10 MG: 10 TABLET, FILM COATED ORAL at 08:22

## 2018-05-31 NOTE — PROGRESS NOTES
CLINICAL NUTRITION SERVICES - REASSESSMENT NOTE     Nutrition Prescription    RECOMMENDATIONS FOR MDs/PROVIDERS TO ORDER:  Continue liberalized diet as ordered    Malnutrition Status:    Non-severe malnutrition in the context of chronic illness    Recommendations already ordered by Registered Dietitian (RD):  Adjusted timing of Nepro Supplements    Future/Additional Recommendations:  If needs nutrition support (i.e. post-op if receives transplant), recommend either Nepro (if needs lower K+/phos formula) with eventual goal of 60 ml/hr (1440 ml/day) to provide 2592 kcals (35 kcal/kg/day), 117 g PRO (1.6 g/kg/day), 1051 ml free H2O, 232 g CHO and 18 g Fiber daily vs TwoCal HN (if wish standard K+/phos content) @ 55 mL/hr (1320 mL/day) to provide 2640 kcals (35 kcal/kg/day), 111 g PRO (1.5 g/kg/day), 924 mL H2O, 289 g CHO and 7 g Fiber daily.     EVALUATION OF THE PROGRESS TOWARD GOALS   Diet: Regular, 2000 mL fluid restriction, Nepro daily @ 2 pm  Intake: Meal intake most often documented as 100%, sometimes 75%. Appetite good, eating well per RN notes and per pt report.Pt states he is focusing on protein foods and eating some at each meal. He uses Nepro as a snack when his is hungry, doesn't always have them every day (has most days).       NEW FINDINGS   -Renal: remains on HD, K+ and phos controlled    MALNUTRITION  % Intake: No decreased intake noted  % Weight Loss: None noted, current wt 78.8 kg remains >lowest admit wt 74.7 kg on 4/21  Subcutaneous Fat Loss: Facial region and Lower arm: mild   Muscle Loss: Temporal, Thoracic region (clavicle, acromium bone, deltoid, trapezius, pectoral), Upper arm (bicep, tricep) and Lower arm  (forearm): moderate   Fluid Accumulation/Edema: Does not meet criteria  Malnutrition Diagnosis: Non-severe malnutrition in the context of chronic illness    Previous Goals   Pt to consume at least 1 protein source at TID meals + 1 protein supplement daily.  Evaluation: Suspect met per pt  report and food record on most days    Previous Nutrition Diagnosis  Predicted inadequate nutrient intake (protein) related to increased needs on HD AEB historically had difficulties w/ menu options and eating enough protein foods    Evaluation: No change     CURRENT NUTRITION DIAGNOSIS  Predicted inadequate nutrient intake (protein) related to increased needs on HD AEB historically had difficulties w/ menu options and eating enough protein foods    INTERVENTIONS  Implementation  Medical food supplement therapy - Continue Nepro supplements, pt requested to have them sent every-other day to avoid having them pile up.      Goals  Pt to consume at least 1 protein source at TID meals + ~1 protein supplement or additional snack daily.    Monitoring/Evaluation  Progress toward goals will be monitored and evaluated per protocol.     Zunilda Deleon, RD, LD, [facility]  CVICU Dietitian  Pager: [phone]

## 2018-05-31 NOTE — PROGRESS NOTES
"  Advanced Heart Failure and Transplant Cardiology  Progress Note:    Assessment and Plan:  63 year old male with a PMH of CAD, ICM (EF of 15-20%), ESRD, bicuspid aortic valve with AI, severe MR, and proximal AAA who was admitted for decompensated heart failure. He is on dobutamine 2.5 mcg/kg/min and listed for heart/kidney transplant as status 1A.    Major Changes:  - none    # Stage D NYHA Class III systolic heart failure secondary to ICM, listed 1A  # Severe mitral regurgitation  # Severe holodiastolic aortic insufficiency   # Non-sustained ventricular tachycardia (minimal)  - dobutamine 2.5 mcg/kg/min, dopamine 2.5 mcg/kg/min  - hydralazine 25 mg q6 hours on non-dialysis days + 10 mg BID on dialysis days (after HD)  - isordil 10 mg TID on non-HD days  - weekly CXR (next 6/4/18) for [place] position  - nephrology challenging dry weight    # Gout:  - prednisone taper through 5/31       # Chronic Medical Issues:  - Seborrheic keratoses / Dermatofibromas / Multiple benign nevi / Nummular dermatitis: derm consulted, all lesions benign  - Hx of allergic rhinitis: cetrizine  - ESRD: Dialysis T, Th, Sa   - Non-obstructive CAD, Anomalous RCA: ASA 81 mg, atorvastatin 40 mg daily  - Ascending aortic aneurysm: 4.5 x 4.5 cm proximal ascending aortic aneurysm seen on MRI 2/14  - Multiple benign branch duct-IPMNs: no concerning features affecting transplant candidacy      FEN: regular diet, 2L FR  PROPHY: ambulation  LINES: PICC  DISPO: TBD  CODE STATUS:  Full    Sukumar Mejía, CVD Fellow  Seen and discussed with Dr. Minaya.    ================================================================  Subjective/24-Hr Events:   - no acute issues overnight    ROS:  4 point ROS including respiratory, CV, GI and  (other than that noted in the HPI) is negative.     Medications: Reviewed in EPIC.     Physical Exam:   /72  Pulse 107  Temp 98.3  F (36.8  C) (Oral)  Resp 14  Ht 1.727 m (5' 8\")  Wt 78.8 kg (173 lb 11.6 oz)  SpO2 100%  " BMI 26.41 kg/m2    GENERAL: A&O x 3, NAD  NECK: no JVD, L-sided [place] in place bandaged and c/d/i  CV: Mildly tachycardic, +S1S2, soft murmur across precordium  RESPIRATORY: CTAB including bases  GI: soft, NT, ND  EXTREMITIES: trace pretibial edema, 2+ bilateral pedal pulses. Warm.   NEUROLOGIC: Alert and oriented x 3. No focal deficits.   SKIN: No jaundice. No rashes or lesions.     Labs / Imaging:  - all labs and imaging reviewed

## 2018-05-31 NOTE — PROGRESS NOTES
Pt A&O, up independently, had dialysis today, pipe numbers stable, dobutamine and dopamine at 2.5.  Afebrile. On RA.

## 2018-05-31 NOTE — PROGRESS NOTES
HEMODIALYSIS TREATMENT NOTE    Date: 5/31/2018  Time: 3:01 PM    Data:  Pre Wt: 78.8 kg (173 lb 11.6 oz)   Desired Wt: 75.5 kg   Post Wt: 76 kg (167 lb 8.8 oz)  Weight gain: -2.8 kg   Weight change: 2.8 kg  Ultrafiltration - Post Run Net Total Removed (mL): 3300 mL  Ultrafiltration - Post Run Net Total Gain (mL): 0 mL  Vascular Access Status: Yes, secured and visible  Dialyzer Rinse: Streaked, Light  Total Blood Volume Processed: 66.9  Total Dialysis (Treatment) Time:      Lab:        Interventions:  3.5 hours of HD with 3.3 kgs pulled. Tolerated treatment well with no complications Hand off to Steven    Assessment:  ESRD patient in ICU needing regular HD      Plan:    Per renal

## 2018-05-31 NOTE — PROGRESS NOTES
Care Coordinator Progress Note    Admission Date/Time:  4/16/2018  Attending MD:  Klever Ernst MD    Data  Chart reviewed, discussed with interdisciplinary team.   Patient was admitted for:    Chronic systolic heart failure (H)  End stage renal disease (H).    Assessment  Pt remain in ICU as status 1A, waiting for heart and kidney transplant.  Visited pt for support.  Pt stated the team have been updating him well about the plan of care and very appreciative for the care he is receiving     Plan  Anticipated Discharge Date:  TBD.  Anticipated Discharge Plan:   TBD.  RNCC will cont to follow plan of care.      Gianna Dougherty RN, PHN, BSN  4A and 4E/ ICU  Care Coordinator  Phone: [phone]  Pager: [phone]

## 2018-05-31 NOTE — PROGRESS NOTES
Social Work Services Progress Note    Hospital Day: 46  Collaborated with:  Pt, Bedside RN    Data:  SW was consulted by bedside RN per Pt's request for information about a mobile grooming service for a haircut.    Intervention:  Writer provided Pt with a resource for a haircut. Pt also requested assistance with faxing paperwork to the SS office. Writer faxed the paperwork as requested.    Assessment:  Pt is alert and actively working on his affairs while hospitalized.      Barby Ch, [facility]  ICU   Pager: [phone]

## 2018-05-31 NOTE — PLAN OF CARE
Problem: Cardiac: Heart Failure (Adult)  Goal: Signs and Symptoms of Listed Potential Problems Will be Absent, Minimized or Managed (Cardiac: Heart Failure)  Signs and symptoms of listed potential problems will be absent, minimized or managed by discharge/transition of care (reference Cardiac: Heart Failure (Adult) CPG).   Outcome: No Change  Pt a/o x 4. Slept well. BECKA numbers: PA: 46/30, CVP: 10, SVO2: 53, CI: 2.3, CO: 4.5, SVR: 1300. Remains on Dopamine and Dobutamine. Anuric, no BM. Plan for HD in AM. Will continue to monitor and notify team with concerns.

## 2018-05-31 NOTE — PROGRESS NOTES
Nephrology Progress Note  05/31/2018         Assessment & Recommendations:   Murray Nicholson is a 63 yr old male with PMH of HTN, DMII, functionally bicuspid aortic valve, CHF/CM (EF 10-15%), ESRD, admitted with worsening dyspnea/SOB, now on dobutamine and dopamine pending heart/kidney transplant.       ESKD: due to DMII, on PD since Aug 2017, changed to iHD 1/2018 due to polymicrobial and fungal peritonitis, now on TTS schedule at [facility] under care of Dr Mcpherson. Access: tunneled RIJ (replaced 4/17/18). Run time: 4 hrs; EDW 75.5 kg.  - Dialysis per TTS schedule   - Using low dose heparin on HD  - Heparin lock CVC        Volume: EDW 76 kg. RHC 5/1 (done at 77 kg): RA 3, PCW 15; CVP 7   - Much prefer standing weights if possible (appreciate ICU obtaining pt's own scale!)  - Will reduce EDW to 75.5 kg as pt was still having SOB at 76 kg.      Severe AV and MR insufficiency:   BP/congestive CM (EF 10-15%); minimal CAD per angio on 2/8/17. Chronically hypotensive with tachycardia  - Goal MAP > 65 and SBP > 95 while dialyzing  - Listed for heart/kidney tx, in ICU on dobutamine and dopamine         Anemia: hgb 8.4, labs 5/8: ferritin 621, IS 27, iron 58; on maintenance venofer 50 mg qTuesday   - Continue venofer qTues  - On epogen 4000 units per HD      BMD: iCa 5.3, phos 3.9; recent ; Vit D 27; on hectorol 0.5 mcg via HD; on phoslo 1 tab TID with meals.   - Hectorol has been stopped  - Phoslo has been stopped     Recommendations were communicated to primary team via this note.       Kristi Armas PA-C  Division of Kidney Disease  Pager [phone]      Interval History :   Seen on dialysis, will likely reach EDW today. Doing well. Remains in ICU in DB drip.  No changes. Denies CP, SOB, chills, n/v.    Review of Systems:   4 point ROS negative other than as noted above.    Physical Exam:   I/O last 3 completed shifts:  In: 986.4 [P.O.:600; I.V.:386.4]  Out: 10 [Urine:10]   /70  Pulse 107  Temp  "98.3  F (36.8  C) (Oral)  Resp 14  Ht 1.727 m (5' 8\")  Wt 78.8 kg (173 lb 11.6 oz)  SpO2 100%  BMI 26.41 kg/m2     GENERAL APPEARANCE: alert, NAD  EYES: no scleral icterus, pupils equal   Pulmonary: lungs clear to auscultation with equal breath sounds bilaterally; has swan  CV: regular rhythm, tachycardia at baseline   - Edema trace  GI: soft, nontender, normal bowel sounds  MS: no evidence of inflammation in joints, no muscle tenderness  : no doyle  SKIN: no rash, warm, dry, no cyanosis  NEURO: face symmetric, no asterixis   Access: tunneled RIJ       Labs:   All labs reviewed by me  Electrolytes/Renal -   Recent Labs   Lab Test  05/31/18   0502  05/30/18   0525  05/29/18   0434   05/26/18   1632   05/25/18   2122   NA  136  135  139   < >   --    < >   --    POTASSIUM  4.3  4.2  4.8   < >  4.0   < >  4.2   CHLORIDE  100  99  101   < >   --    < >   --    CO2  25  28  25   < >   --    < >   --    BUN  55*  39*  66*   < >   --    < >   --    CR  7.91*  5.95*  9.50*   < >   --    < >   --    GLC  92  151*  101*   < >   --    < >   --    TRINI  8.8  8.8  9.1   < >   --    < >   --    MAG  1.7  1.7  2.2   < >  1.7   < >  2.0   PHOS   --    --   3.9   --   3.5   --   1.9*    < > = values in this interval not displayed.       CBC -   Recent Labs   Lab Test  05/31/18   0502  05/30/18   0525  05/29/18   0434   WBC  7.0  8.3  8.9   HGB  8.4*  8.9*  8.3*   PLT  249  300  274       LFTs -   Recent Labs   Lab Test  04/16/18   1546  03/07/18   0833  02/19/18   1558  02/02/18   1736   ALKPHOS  123  129  102  86   BILITOTAL  0.8  0.7  0.6  0.4   ALT  22  21  15  16   AST  17  18  18  20   PROTTOTAL  7.4   --   7.2  6.2*   ALBUMIN  3.5   --   2.7*  2.1*       Iron Panel -   Recent Labs   Lab Test  05/08/18   0954  07/19/17   1306  07/05/17   1204   IRON  58  46  26*   IRONSAT  27  18  12*   ALBERTINA  621*  369  542*         Imaging:  Reviewed    Current Medications:    albuterol  2.5 mg Nebulization At Bedtime     allopurinol  100 " mg Oral Daily     aspirin  81 mg Oral Daily     atorvastatin  40 mg Oral Daily     cetirizine  10 mg Oral Daily     cyanocolbalamin  100 mcg Oral Daily     fluticasone  1-2 spray Both Nostrils Daily     gelatin absorbable  1 each Topical During Hemodialysis (from stock)     hydrALAZINE  25 mg Oral 4 times per day on Sun Mon Wed Fri    And     hydrALAZINE  10 mg Oral 2 times per day on Tue Thu Sat     insulin aspart   Subcutaneous QAM AC     insulin aspart   Subcutaneous Daily with lunch     insulin aspart   Subcutaneous Daily with supper     insulin aspart  1-10 Units Subcutaneous TID AC     insulin aspart  1-7 Units Subcutaneous At Bedtime     insulin glargine  10 Units Subcutaneous QAM AC     isosorbide dinitrate  10 mg Oral 3 times per day on Sun Mon Wed Fri     NEPHROCAPS  1 capsule Oral Daily     omeprazole  20 mg Oral Daily     polyethylene glycol  17 g Oral Daily     senna-docusate  1 tablet Oral BID     sodium chloride (PF)  10 mL Intracatheter Q7 Days     sodium chloride (PF)  3 mL Intracatheter During Hemodialysis (from stock)     sodium chloride (PF)  3 mL Intracatheter During Hemodialysis (from stock)     sodium chloride (PF)  3 mL Intracatheter Q8H     vitamin  B-1  100 mg Oral Daily       DOBUTamine 2.5 mcg/kg/min (05/31/18 0800)     DOPamine 2.5 mcg/kg/min (05/31/18 0800)     heparin (porcine) 500 Units/hr (05/31/18 1055)     - MEDICATION INSTRUCTIONS -       Reason ACE/ARB/ARNI order not selected       Reason beta blocker order not selected       Kristi Armas PA-C

## 2018-06-01 ENCOUNTER — APPOINTMENT (OUTPATIENT)
Dept: OCCUPATIONAL THERAPY | Facility: CLINIC | Age: 63
DRG: 001 | End: 2018-06-01
Attending: INTERNAL MEDICINE
Payer: COMMERCIAL

## 2018-06-01 LAB
BASE EXCESS BLDV CALC-SCNC: 0.8 MMOL/L
GLUCOSE BLDC GLUCOMTR-MCNC: 108 MG/DL (ref 70–99)
GLUCOSE BLDC GLUCOMTR-MCNC: 153 MG/DL (ref 70–99)
GLUCOSE BLDC GLUCOMTR-MCNC: 245 MG/DL (ref 70–99)
GLUCOSE BLDC GLUCOMTR-MCNC: 89 MG/DL (ref 70–99)
HCO3 BLDV-SCNC: 26 MMOL/L (ref 21–28)
O2/TOTAL GAS SETTING VFR VENT: 21 %
OXYHGB MFR BLDV: 59 %
PCO2 BLDV: 42 MM HG (ref 40–50)
PH BLDV: 7.4 PH (ref 7.32–7.43)
PO2 BLDV: 36 MM HG (ref 25–47)

## 2018-06-01 PROCEDURE — 82805 BLOOD GASES W/O2 SATURATION: CPT | Performed by: INTERNAL MEDICINE

## 2018-06-01 PROCEDURE — 40000196 ZZH STATISTIC RAPCV CVP MONITORING

## 2018-06-01 PROCEDURE — 25000132 ZZH RX MED GY IP 250 OP 250 PS 637: Performed by: STUDENT IN AN ORGANIZED HEALTH CARE EDUCATION/TRAINING PROGRAM

## 2018-06-01 PROCEDURE — 25000132 ZZH RX MED GY IP 250 OP 250 PS 637: Mod: GY | Performed by: NURSE PRACTITIONER

## 2018-06-01 PROCEDURE — 40000014 ZZH STATISTIC ARTERIAL MONITORING DAILY

## 2018-06-01 PROCEDURE — A9270 NON-COVERED ITEM OR SERVICE: HCPCS | Mod: GY | Performed by: STUDENT IN AN ORGANIZED HEALTH CARE EDUCATION/TRAINING PROGRAM

## 2018-06-01 PROCEDURE — 25000132 ZZH RX MED GY IP 250 OP 250 PS 637: Mod: GY | Performed by: INTERNAL MEDICINE

## 2018-06-01 PROCEDURE — 25000132 ZZH RX MED GY IP 250 OP 250 PS 637: Mod: GY | Performed by: STUDENT IN AN ORGANIZED HEALTH CARE EDUCATION/TRAINING PROGRAM

## 2018-06-01 PROCEDURE — 00000146 ZZHCL STATISTIC GLUCOSE BY METER IP

## 2018-06-01 PROCEDURE — 40000048 ZZH STATISTIC DAILY SWAN MONITORING

## 2018-06-01 PROCEDURE — 20000004 ZZH R&B ICU UMMC

## 2018-06-01 PROCEDURE — 25000125 ZZHC RX 250: Performed by: STUDENT IN AN ORGANIZED HEALTH CARE EDUCATION/TRAINING PROGRAM

## 2018-06-01 PROCEDURE — 40000133 ZZH STATISTIC OT WARD VISIT: Performed by: OCCUPATIONAL THERAPIST

## 2018-06-01 PROCEDURE — A9270 NON-COVERED ITEM OR SERVICE: HCPCS | Mod: GY | Performed by: NURSE PRACTITIONER

## 2018-06-01 PROCEDURE — 40000275 ZZH STATISTIC RCP TIME EA 10 MIN

## 2018-06-01 PROCEDURE — 97110 THERAPEUTIC EXERCISES: CPT | Mod: GO | Performed by: OCCUPATIONAL THERAPIST

## 2018-06-01 PROCEDURE — 99232 SBSQ HOSP IP/OBS MODERATE 35: CPT | Mod: GC | Performed by: INTERNAL MEDICINE

## 2018-06-01 PROCEDURE — 25000132 ZZH RX MED GY IP 250 OP 250 PS 637: Performed by: NURSE PRACTITIONER

## 2018-06-01 PROCEDURE — A9270 NON-COVERED ITEM OR SERVICE: HCPCS | Mod: GY | Performed by: INTERNAL MEDICINE

## 2018-06-01 RX ORDER — MAGNESIUM SULFATE HEPTAHYDRATE 40 MG/ML
2 INJECTION, SOLUTION INTRAVENOUS DAILY PRN
Status: DISCONTINUED | OUTPATIENT
Start: 2018-06-01 | End: 2018-06-15

## 2018-06-01 RX ADMIN — VITAMIN B12 0.1 MG ORAL TABLET 100 MCG: 0.1 TABLET ORAL at 08:52

## 2018-06-01 RX ADMIN — Medication 25 MG: at 13:07

## 2018-06-01 RX ADMIN — ALLOPURINOL 100 MG: 100 TABLET ORAL at 08:52

## 2018-06-01 RX ADMIN — ISOSORBIDE DINITRATE 10 MG: 10 TABLET ORAL at 09:01

## 2018-06-01 RX ADMIN — ASPIRIN 81 MG CHEWABLE TABLET 81 MG: 81 TABLET CHEWABLE at 20:04

## 2018-06-01 RX ADMIN — ATORVASTATIN CALCIUM 40 MG: 40 TABLET, FILM COATED ORAL at 20:04

## 2018-06-01 RX ADMIN — Medication 100 MG: at 08:52

## 2018-06-01 RX ADMIN — OMEPRAZOLE 20 MG: 20 CAPSULE, DELAYED RELEASE ORAL at 20:04

## 2018-06-01 RX ADMIN — CETIRIZINE HYDROCHLORIDE 10 MG: 10 TABLET, FILM COATED ORAL at 08:52

## 2018-06-01 RX ADMIN — INSULIN ASPART 6 UNITS: 100 INJECTION, SOLUTION INTRAVENOUS; SUBCUTANEOUS at 13:45

## 2018-06-01 RX ADMIN — Medication 1 CAPSULE: at 08:52

## 2018-06-01 RX ADMIN — INSULIN ASPART 5 UNITS: 100 INJECTION, SOLUTION INTRAVENOUS; SUBCUTANEOUS at 18:55

## 2018-06-01 RX ADMIN — INSULIN GLARGINE 10 UNITS: 100 INJECTION, SOLUTION SUBCUTANEOUS at 08:56

## 2018-06-01 RX ADMIN — FLUTICASONE PROPIONATE 2 SPRAY: 50 SPRAY, METERED NASAL at 20:04

## 2018-06-01 RX ADMIN — Medication 25 MG: at 22:47

## 2018-06-01 RX ADMIN — ISOSORBIDE DINITRATE 10 MG: 10 TABLET ORAL at 20:04

## 2018-06-01 RX ADMIN — ISOSORBIDE DINITRATE 10 MG: 10 TABLET ORAL at 13:46

## 2018-06-01 RX ADMIN — ALBUTEROL SULFATE 2.5 MG: 2.5 SOLUTION RESPIRATORY (INHALATION) at 22:04

## 2018-06-01 RX ADMIN — Medication 25 MG: at 18:41

## 2018-06-01 RX ADMIN — INSULIN ASPART 1 UNITS: 100 INJECTION, SOLUTION INTRAVENOUS; SUBCUTANEOUS at 13:45

## 2018-06-01 RX ADMIN — Medication 25 MG: at 09:01

## 2018-06-01 NOTE — PLAN OF CARE
Problem: Patient Care Overview  Goal: Plan of Care/Patient Progress Review  OT/CR 4E: Discharge Planner OT   Patient plan for discharge: Pt hopeful to return to home with OP CR pending post surgical outcome  Current status: Pt continues to do well with participation in therapy,  Progressed ambulation distance and pace, walking >2200ft.  Instructed pt in dynamic standing exercises involving BUE, BLE, core and balance work; mild lightheadedness with soft BP 88/55.  RN informed.  Barriers to return to prior living situation: acute medical needs; awaiting heart/kidney transplant  Recommendations for discharge: Currently appropriate for return to home with OP CR; will update post surgically  Rationale for recommendations: Pt is safe and independent with with basic mobility and self cares.  Will benefit from continued CR to progress strength, endurance and aerobic conditioning.         Entered by: Lynne Garcia 06/01/2018 10:57 AM

## 2018-06-01 NOTE — PROGRESS NOTES
Advanced Heart Failure and Transplant Cardiology  Progress Note:    Assessment and Plan:  63 year old male with a PMH of CAD, ICM (EF of 15-20%), ESRD, bicuspid aortic valve with AI, severe MR, and proximal AAA who was admitted for decompensated heart failure. He is on dobutamine 2.5 mcg/kg/min and listed for heart/kidney transplant as status 1A.    Major Changes:  - none    # Stage D NYHA Class III systolic heart failure secondary to ICM, listed 1A  # Severe mitral regurgitation  # Severe holodiastolic aortic insufficiency   # Non-sustained ventricular tachycardia (minimal)  - dobutamine 2.5 mcg/kg/min, dopamine 2.5 mcg/kg/min  - hydralazine 25 mg q6 hours on non-dialysis days + 10 mg BID on dialysis days (after HD)  - isordil 10 mg TID on non-HD days  - weekly CXR (next 6/4/18) for [place] position  - nephrology challenging dry weight       # Chronic Medical Issues:  - Seborrheic keratoses / Dermatofibromas / Multiple benign nevi / Nummular dermatitis: derm consulted, all lesions benign  - Hx of allergic rhinitis: cetrizine  - ESRD: Dialysis T, Th, Sa   - Non-obstructive CAD, Anomalous RCA: ASA 81 mg, atorvastatin 40 mg daily  - Ascending aortic aneurysm: 4.5 x 4.5 cm proximal ascending aortic aneurysm seen on MRI 2/14  - Multiple benign branch duct-IPMNs: no concerning features affecting transplant candidacy  - gout: allopurinol, prednisone 6/26 - 5/31 (tapered for acute flare)      FEN: regular diet, 2L FR  PROPHY: ambulation  LINES: PICC  DISPO: TBD  CODE STATUS:  Full    Sukumar Mejía, CVD Fellow  Seen and discussed with Dr. Mccarthy.    Attending Attestation:  Patient seen and examined by me with the team. I have performed all pertinent elements of the physical examination and reviewed the note above. I have reviewed pertinent laboratory, echocardiographic, imaging, and cardiac catheterization results. I agree with the plan of care as described in this note.    Murray Mccarthy MD,  "PhD  ================================================================    Subjective/24-Hr Events:   - no acute issues overnight    ROS:  4 point ROS including respiratory, CV, GI and  (other than that noted in the HPI) is negative.     Medications: Reviewed in EPIC.     Physical Exam:   BP 99/67 (BP Location: Right arm)  Pulse 107  Temp 98  F (36.7  C) (Oral)  Resp 14  Ht 1.727 m (5' 8\")  Wt 76.6 kg (168 lb 15.7 oz)  SpO2 100%  BMI 25.69 kg/m2    GENERAL: A&O x 3, NAD  NECK: no JVD, L-sided [place] in place bandaged and c/d/i  CV: Mildly tachycardic, +S1S2, soft murmur across precordium  RESPIRATORY: CTAB including bases  GI: soft, NT, ND  EXTREMITIES: trace pretibial edema, 2+ bilateral pedal pulses. Warm.   NEUROLOGIC: Alert and oriented x 3. No focal deficits.   SKIN: No jaundice. No rashes or lesions.     Labs / Imaging:  - all labs and imaging reviewed      "

## 2018-06-02 LAB
ABO + RH BLD: NORMAL
ABO + RH BLD: NORMAL
ANION GAP SERPL CALCULATED.3IONS-SCNC: 12 MMOL/L (ref 3–14)
BASE EXCESS BLDV CALC-SCNC: 0.4 MMOL/L
BASE EXCESS BLDV CALC-SCNC: 2.1 MMOL/L
BLD GP AB SCN SERPL QL: NORMAL
BLOOD BANK CMNT PATIENT-IMP: NORMAL
BUN SERPL-MCNC: 50 MG/DL (ref 7–30)
CALCIUM SERPL-MCNC: 7.9 MG/DL (ref 8.5–10.1)
CHLORIDE SERPL-SCNC: 107 MMOL/L (ref 94–109)
CO2 SERPL-SCNC: 22 MMOL/L (ref 20–32)
CREAT SERPL-MCNC: 6.99 MG/DL (ref 0.66–1.25)
ERYTHROCYTE [DISTWIDTH] IN BLOOD BY AUTOMATED COUNT: 16.7 % (ref 10–15)
GFR SERPL CREATININE-BSD FRML MDRD: 8 ML/MIN/1.7M2
GLUCOSE BLDC GLUCOMTR-MCNC: 106 MG/DL (ref 70–99)
GLUCOSE BLDC GLUCOMTR-MCNC: 125 MG/DL (ref 70–99)
GLUCOSE BLDC GLUCOMTR-MCNC: 133 MG/DL (ref 70–99)
GLUCOSE BLDC GLUCOMTR-MCNC: 158 MG/DL (ref 70–99)
GLUCOSE SERPL-MCNC: 93 MG/DL (ref 70–99)
HCO3 BLDV-SCNC: 25 MMOL/L (ref 21–28)
HCO3 BLDV-SCNC: 26 MMOL/L (ref 21–28)
HCT VFR BLD AUTO: 27.2 % (ref 40–53)
HGB BLD-MCNC: 8.5 G/DL (ref 13.3–17.7)
MAGNESIUM SERPL-MCNC: 1.5 MG/DL (ref 1.6–2.3)
MAGNESIUM SERPL-MCNC: 2.5 MG/DL (ref 1.6–2.3)
MCH RBC QN AUTO: 28.7 PG (ref 26.5–33)
MCHC RBC AUTO-ENTMCNC: 31.3 G/DL (ref 31.5–36.5)
MCV RBC AUTO: 92 FL (ref 78–100)
O2/TOTAL GAS SETTING VFR VENT: 21 %
O2/TOTAL GAS SETTING VFR VENT: NORMAL %
OXYHGB MFR BLDV: 60 %
OXYHGB MFR BLDV: 66 %
PCO2 BLDV: 38 MM HG (ref 40–50)
PCO2 BLDV: 40 MM HG (ref 40–50)
PH BLDV: 7.41 PH (ref 7.32–7.43)
PH BLDV: 7.45 PH (ref 7.32–7.43)
PLATELET # BLD AUTO: 287 10E9/L (ref 150–450)
PO2 BLDV: 36 MM HG (ref 25–47)
PO2 BLDV: 38 MM HG (ref 25–47)
POTASSIUM SERPL-SCNC: 4.4 MMOL/L (ref 3.4–5.3)
RBC # BLD AUTO: 2.96 10E12/L (ref 4.4–5.9)
SODIUM SERPL-SCNC: 140 MMOL/L (ref 133–144)
SPECIMEN EXP DATE BLD: NORMAL
WBC # BLD AUTO: 6.5 10E9/L (ref 4–11)

## 2018-06-02 PROCEDURE — 90937 HEMODIALYSIS REPEATED EVAL: CPT

## 2018-06-02 PROCEDURE — 25000132 ZZH RX MED GY IP 250 OP 250 PS 637: Performed by: NURSE PRACTITIONER

## 2018-06-02 PROCEDURE — 85027 COMPLETE CBC AUTOMATED: CPT | Performed by: NURSE PRACTITIONER

## 2018-06-02 PROCEDURE — 86901 BLOOD TYPING SEROLOGIC RH(D): CPT | Performed by: INTERNAL MEDICINE

## 2018-06-02 PROCEDURE — 25000132 ZZH RX MED GY IP 250 OP 250 PS 637: Performed by: INTERNAL MEDICINE

## 2018-06-02 PROCEDURE — 25000125 ZZHC RX 250: Performed by: STUDENT IN AN ORGANIZED HEALTH CARE EDUCATION/TRAINING PROGRAM

## 2018-06-02 PROCEDURE — 25000131 ZZH RX MED GY IP 250 OP 636 PS 637: Performed by: STUDENT IN AN ORGANIZED HEALTH CARE EDUCATION/TRAINING PROGRAM

## 2018-06-02 PROCEDURE — 20000004 ZZH R&B ICU UMMC

## 2018-06-02 PROCEDURE — 25000128 H RX IP 250 OP 636: Performed by: INTERNAL MEDICINE

## 2018-06-02 PROCEDURE — 25000132 ZZH RX MED GY IP 250 OP 250 PS 637: Performed by: STUDENT IN AN ORGANIZED HEALTH CARE EDUCATION/TRAINING PROGRAM

## 2018-06-02 PROCEDURE — 25000128 H RX IP 250 OP 636: Performed by: NURSE PRACTITIONER

## 2018-06-02 PROCEDURE — 40000048 ZZH STATISTIC DAILY SWAN MONITORING

## 2018-06-02 PROCEDURE — A9270 NON-COVERED ITEM OR SERVICE: HCPCS | Mod: GY | Performed by: STUDENT IN AN ORGANIZED HEALTH CARE EDUCATION/TRAINING PROGRAM

## 2018-06-02 PROCEDURE — 99232 SBSQ HOSP IP/OBS MODERATE 35: CPT | Mod: GC | Performed by: INTERNAL MEDICINE

## 2018-06-02 PROCEDURE — 82805 BLOOD GASES W/O2 SATURATION: CPT | Performed by: INTERNAL MEDICINE

## 2018-06-02 PROCEDURE — 83735 ASSAY OF MAGNESIUM: CPT | Performed by: NURSE PRACTITIONER

## 2018-06-02 PROCEDURE — 80048 BASIC METABOLIC PNL TOTAL CA: CPT | Performed by: NURSE PRACTITIONER

## 2018-06-02 PROCEDURE — 40000196 ZZH STATISTIC RAPCV CVP MONITORING

## 2018-06-02 PROCEDURE — 00000146 ZZHCL STATISTIC GLUCOSE BY METER IP

## 2018-06-02 PROCEDURE — 86900 BLOOD TYPING SEROLOGIC ABO: CPT | Performed by: INTERNAL MEDICINE

## 2018-06-02 PROCEDURE — 25000128 H RX IP 250 OP 636: Performed by: STUDENT IN AN ORGANIZED HEALTH CARE EDUCATION/TRAINING PROGRAM

## 2018-06-02 PROCEDURE — A9270 NON-COVERED ITEM OR SERVICE: HCPCS | Mod: GY | Performed by: NURSE PRACTITIONER

## 2018-06-02 PROCEDURE — 86850 RBC ANTIBODY SCREEN: CPT | Performed by: INTERNAL MEDICINE

## 2018-06-02 PROCEDURE — 83735 ASSAY OF MAGNESIUM: CPT | Performed by: INTERNAL MEDICINE

## 2018-06-02 PROCEDURE — 63400005 ZZH RX 634: Performed by: INTERNAL MEDICINE

## 2018-06-02 PROCEDURE — A9270 NON-COVERED ITEM OR SERVICE: HCPCS | Mod: GY | Performed by: INTERNAL MEDICINE

## 2018-06-02 RX ORDER — HEPARIN SODIUM 1000 [USP'U]/ML
500 INJECTION, SOLUTION INTRAVENOUS; SUBCUTANEOUS
Status: COMPLETED | OUTPATIENT
Start: 2018-06-02 | End: 2018-06-02

## 2018-06-02 RX ORDER — HEPARIN SODIUM 1000 [USP'U]/ML
500 INJECTION, SOLUTION INTRAVENOUS; SUBCUTANEOUS CONTINUOUS
Status: DISCONTINUED | OUTPATIENT
Start: 2018-06-02 | End: 2018-06-02

## 2018-06-02 RX ADMIN — SODIUM CHLORIDE 300 ML: 9 INJECTION, SOLUTION INTRAVENOUS at 11:35

## 2018-06-02 RX ADMIN — ALLOPURINOL 100 MG: 100 TABLET ORAL at 07:35

## 2018-06-02 RX ADMIN — ASPIRIN 81 MG CHEWABLE TABLET 81 MG: 81 TABLET CHEWABLE at 20:47

## 2018-06-02 RX ADMIN — INSULIN ASPART: 100 INJECTION, SOLUTION INTRAVENOUS; SUBCUTANEOUS at 14:54

## 2018-06-02 RX ADMIN — SODIUM CHLORIDE 250 ML: 9 INJECTION, SOLUTION INTRAVENOUS at 11:35

## 2018-06-02 RX ADMIN — HEPARIN SODIUM 500 UNITS: 1000 INJECTION, SOLUTION INTRAVENOUS; SUBCUTANEOUS at 11:34

## 2018-06-02 RX ADMIN — OMEPRAZOLE 20 MG: 20 CAPSULE, DELAYED RELEASE ORAL at 20:47

## 2018-06-02 RX ADMIN — VITAMIN B12 0.1 MG ORAL TABLET 100 MCG: 0.1 TABLET ORAL at 07:35

## 2018-06-02 RX ADMIN — MAGNESIUM SULFATE HEPTAHYDRATE 4 G: 40 INJECTION, SOLUTION INTRAVENOUS at 07:34

## 2018-06-02 RX ADMIN — HEPARIN SODIUM 3000 UNITS: 1000 INJECTION, SOLUTION INTRAVENOUS; SUBCUTANEOUS at 11:36

## 2018-06-02 RX ADMIN — DOPAMINE HYDROCHLORIDE IN DEXTROSE 2.5 MCG/KG/MIN: 1.6 INJECTION, SOLUTION INTRAVENOUS at 03:20

## 2018-06-02 RX ADMIN — INSULIN GLARGINE 10 UNITS: 100 INJECTION, SOLUTION SUBCUTANEOUS at 07:50

## 2018-06-02 RX ADMIN — HYDRALAZINE HYDROCHLORIDE 10 MG: 10 TABLET, FILM COATED ORAL at 14:54

## 2018-06-02 RX ADMIN — CETIRIZINE HYDROCHLORIDE 10 MG: 10 TABLET, FILM COATED ORAL at 07:35

## 2018-06-02 RX ADMIN — HYDRALAZINE HYDROCHLORIDE 10 MG: 10 TABLET, FILM COATED ORAL at 20:48

## 2018-06-02 RX ADMIN — FLUTICASONE PROPIONATE 2 SPRAY: 50 SPRAY, METERED NASAL at 20:49

## 2018-06-02 RX ADMIN — ALBUTEROL SULFATE 2.5 MG: 2.5 SOLUTION RESPIRATORY (INHALATION) at 21:27

## 2018-06-02 RX ADMIN — Medication 100 MG: at 07:35

## 2018-06-02 RX ADMIN — ATORVASTATIN CALCIUM 40 MG: 40 TABLET, FILM COATED ORAL at 20:47

## 2018-06-02 RX ADMIN — Medication 1 CAPSULE: at 07:34

## 2018-06-02 RX ADMIN — HEPARIN SODIUM 500 UNITS/HR: 1000 INJECTION, SOLUTION INTRAVENOUS; SUBCUTANEOUS at 11:35

## 2018-06-02 RX ADMIN — EPOETIN ALFA 4000 UNITS: 10000 SOLUTION INTRAVENOUS; SUBCUTANEOUS at 13:16

## 2018-06-02 NOTE — PROGRESS NOTES
Cardiology Progress Note    Assessment & Plan   Assessment and Plan:  63 year old male with a PMH of CAD, ICM (EF of 15-20%), ESRD, bicuspid aortic valve with AI, severe MR, and proximal AAA who was admitted for decompensated heart failure. He is on dobutamine 2.5 mcg/kg/min and listed for heart/kidney transplant as status 1A.    Major Changes:  - none, continue dobutamine and dopamine at current doses  - IHD today    # Stage D NYHA Class III systolic heart failure secondary to ICM, listed 1A  # Severe mitral regurgitation  # Severe holodiastolic aortic insufficiency   # Non-sustained ventricular tachycardia (minimal)  - dobutamine 2.5 mcg/kg/min, dopamine 2.5 mcg/kg/min  - hydralazine 25 mg q6 hours on non-dialysis days + 10 mg BID on dialysis days (after HD)  - isordil 10 mg TID on non-HD days  - weekly CXR (next 6/4/18) for [place] position  - nephrology challenging dry weight       # Chronic Medical Issues:  - Seborrheic keratoses / Dermatofibromas / Multiple benign nevi / Nummular dermatitis: derm consulted, all lesions benign  - Hx of allergic rhinitis: cetrizine  - ESRD: Dialysis T, Th, Sa   - Non-obstructive CAD, Anomalous RCA: ASA 81 mg, atorvastatin 40 mg daily  - Ascending aortic aneurysm: 4.5 x 4.5 cm proximal ascending aortic aneurysm seen on MRI 2/14  - Multiple benign branch duct-IPMNs: no concerning features affecting transplant candidacy  - gout: allopurinol, prednisone 6/26 - 5/31 (tapered for acute flare)      FEN: regular diet, 2L FR  PROPHY: ambulation  LINES: PICC  DISPO: TBD  CODE STATUS:  Full    Roxanna Soriano  Cardiology fellow, PGY-4    Attending Attestation:  Patient seen and examined by me with the team. I have performed all pertinent elements of the physical examination and reviewed the note above. I have reviewed pertinent laboratory, echocardiographic, imaging, and cardiac catheterization results. I agree with the plan of care as described in this note.    Murray Mccarthy MD,  "PhD      Interval History   No acute events    afebrile /60-70's   On 2.5 dobutamine and 2.5 dopamine   Up 2kg dialysis today    Physical Exam   Temp: 98.9  F (37.2  C) Temp src: Oral BP: 105/73   Heart Rate: 108 Resp: 18 SpO2: 100 % O2 Device: Nasal cannula Oxygen Delivery: 2.5 LPM  Vitals:    05/31/18 1200 06/01/18 0700 06/02/18 0700   Weight: 76.8 kg (169 lb 5 oz) 76.6 kg (168 lb 15.7 oz) 78 kg (171 lb 15.3 oz)     Vital Signs with Ranges  Temp:  [97.8  F (36.6  C)-98.9  F (37.2  C)] 98.9  F (37.2  C)  Heart Rate:  [104-112] 108  Resp:  [14-18] 18  BP: ()/(63-73) 105/73  Cuff Mean (mmHg):  [79] 79  SpO2:  [100 %] 100 %  I/O last 3 completed shifts:  In: 1806.7 [P.O.:1420; I.V.:386.7]  Out: -     Heart Rate: 108, Blood pressure 105/73, pulse 107, temperature 98.9  F (37.2  C), temperature source Oral, resp. rate 18, height 1.727 m (5' 8\"), weight 78 kg (171 lb 15.3 oz), SpO2 100 %.  171 lbs 15.34 oz  GEN:  Alert, oriented x 3, appears comfortable, NAD.  CV:  Regular rate and rhythm, no murmur JVD mid neck  LUNGS:  Clear to auscultation bilaterally   ABD:  Active bowel sounds, soft, non-tender/non-distended.  No rebound/guarding/rigidity.  EXT:  No edema or cyanosis.      Medications     DOBUTamine 2.5 mcg/kg/min (06/02/18 0555)     DOPamine 2.5 mcg/kg/min (06/02/18 0555)     heparin (porcine)       - MEDICATION INSTRUCTIONS -       Reason ACE/ARB/ARNI order not selected       Reason beta blocker order not selected         sodium chloride 0.9%  250 mL Intravenous Once in dialysis     sodium chloride 0.9%  300 mL Hemodialysis Machine Once     albuterol  2.5 mg Nebulization At Bedtime     allopurinol  100 mg Oral Daily     aspirin  81 mg Oral Daily     atorvastatin  40 mg Oral Daily     cetirizine  10 mg Oral Daily     cyanocolbalamin  100 mcg Oral Daily     epoetin emily (EPOGEN,PROCRIT) inj ESRD  4,000 Units Intravenous Once in dialysis     fluticasone  1-2 spray Both Nostrils Daily     gelatin " absorbable  1 each Topical During Hemodialysis (from stock)     heparin (porcine)  500 Units Hemodialysis Machine Once in dialysis     heparin  3 mL Intracatheter During Hemodialysis (from stock)     heparin  3 mL Intracatheter During Hemodialysis (from stock)     hydrALAZINE  25 mg Oral 4 times per day on Sun Mon Wed Fri    And     hydrALAZINE  10 mg Oral 2 times per day on Tue Thu Sat     insulin aspart   Subcutaneous QAM AC     insulin aspart   Subcutaneous Daily with lunch     insulin aspart   Subcutaneous Daily with supper     insulin aspart  1-10 Units Subcutaneous TID AC     insulin aspart  1-7 Units Subcutaneous At Bedtime     insulin glargine  10 Units Subcutaneous QAM AC     isosorbide dinitrate  10 mg Oral 3 times per day on Sun Mon Wed Fri     NEPHROCAPS  1 capsule Oral Daily     omeprazole  20 mg Oral Daily     polyethylene glycol  17 g Oral Daily     senna-docusate  1 tablet Oral BID     sodium chloride (PF)  10 mL Intracatheter Q7 Days     sodium chloride (PF)  3 mL Intracatheter During Hemodialysis (from stock)     sodium chloride (PF)  3 mL Intracatheter During Hemodialysis (from stock)     sodium chloride (PF)  3 mL Intracatheter Q8H     vitamin  B-1  100 mg Oral Daily       Data     Recent Labs  Lab 06/02/18  0611 05/31/18  0502 05/30/18  0525   WBC 6.5 7.0 8.3   HGB 8.5* 8.4* 8.9*   MCV 92 93 91    249 300    136 135   POTASSIUM 4.4 4.3 4.2   CHLORIDE 107 100 99   CO2 22 25 28   BUN 50* 55* 39*   CR 6.99* 7.91* 5.95*   ANIONGAP 12 11 8   TRINI 7.9* 8.8 8.8   GLC 93 92 151*       No results found for this or any previous visit (from the past 24 hour(s)).

## 2018-06-02 NOTE — PLAN OF CARE
Problem: Patient Care Overview  Goal: Plan of Care/Patient Progress Review  Outcome: No Change    No acute events this shift.     Neuro: A&Ox4. No deficits. Independent.  CV: -110, normotensive. CVP 9, PA 38/24, CI 2.7, SVR 1061.  Resp: Room air, lungs CTA  GI: BM(+)  : Anuric, on HD  Skin: Intact.  Lines: L DL PICC, L IJ [place] @ 64cm.   Drips:  -Dobutamine 2.5  -Dopamine 2.5

## 2018-06-02 NOTE — PROGRESS NOTES
HEMODIALYSIS TREATMENT NOTE    Date: 6/2/2018  Time: 1:51 PM    Data:  Pre Wt: 78 kg (171 lb 15.3 oz)   Desired Wt: 74.7 kg   Post Wt: 74.7 kg (164 lb 10.9 oz)  Weight gain: -3.3 kg   Weight change: 3.3 kg  Ultrafiltration - Post Run Net Total Removed (mL): 3300 mL  Ultrafiltration - Post Run Net Total Gain (mL): 0 mL  Vascular Access Status: Yes, secured and visible  Dialyzer Rinse: Streaked  Total Blood Volume Processed: 67.9  Total Dialysis (Treatment) Time:  3.5 hours    Lab:   No    Interventions:Assessment:  Pt tolerated tx well. VSS through out tx. CVC utilized with lines reversed for adequate arterial flow. New CVC dressing applied. 3.3L of fluid obtained without c/o cramping or nausea. O2 applied per pt request during dialysis. O2 sat at 100% on room air. CVC lumens heparin locked. Hand off report given to ICU nurse.  Plan:    Per nephrology team.

## 2018-06-02 NOTE — PROGRESS NOTES
"  Nephrology Progress Note  06/02/2018         Assessment & Recommendations:   Murray Nicholson is a 63 year old year old male with PMH of HTN, DMII, functionally bicuspid aortic valve, CHF/CM (EF 10-15%), ESRD, admitted with worsening dyspnea/SOB, now on dobutamine and dopamine pending heart/kidney transplant.       ESRD: due to DM, on PD since Aug 2017, changed to HD 1/18, now TTS, at [facility] under care Dr Mcpherson, RIJ tunnel, EDW 75.5kg, 4Hr.   -- HD scheduled TTS.  -- low dose heparin HD.   -- heparin lock CVC,    BP and volume: controlled and UF to dry weight, challenged dry weight to 75.5kg.     Electrolyte and acid base: no issue      Anemia: cont venofer Qtues, cont Epo 4000 u with dialysis      BMD: stable no issue , hectorol and phoslo stopped.      Severe AV and MR/ Cardiomyopathy EF 15%: listed for heart kidney tx.      Recommendations were communicated to primary team via note     Seen and discussed with Dr. Isrrael Saunders MD   [phone]    Interval History :   In the last 24 hours Murray Nicholson feels fine seen during dialysis , denies any new complaint.   Review of Systems:   I reviewed the following systems:  GI: ok  appetite. no nausea or vomiting or diarrhea.   Neuro:  no confusion  Constitutional:  no fever or chills  CV: no dyspnea or edema.  no chest pain.    Physical Exam:   I/O last 3 completed shifts:  In: 1806.7 [P.O.:1420; I.V.:386.7]  Out: -    /71  Pulse 107  Temp 98.1  F (36.7  C) (Oral)  Resp 16  Ht 1.727 m (5' 8\")  Wt 78 kg (171 lb 15.3 oz)  SpO2 100%  BMI 26.15 kg/m2     GENERAL APPEARANCE: NAD  EYES:  no scleral icterus, pupils equal  HENT: mouth without ulcers or lesions  PULM: lungs clear to auscultation,  bilaterally, equal air movement, no clubbing  CV: regular rhythm, normal rate, no rub     -JVD not elevated      -edema no   GI: soft, not tender, nondistended, bowel sounds are +ve  INTEGUMENT: no cyanosis, no rash  NEURO:  no asterixis   Access " Tunnel     Labs:   All labs reviewed by me  Electrolytes/Renal -   Recent Labs   Lab Test  06/02/18   1141  06/02/18   0611  05/31/18   0502  05/30/18   0525  05/29/18   0434   05/26/18   1632   05/25/18   2122   NA   --   140  136  135  139   < >   --    < >   --    POTASSIUM   --   4.4  4.3  4.2  4.8   < >  4.0   < >  4.2   CHLORIDE   --   107  100  99  101   < >   --    < >   --    CO2   --   22  25  28  25   < >   --    < >   --    BUN   --   50*  55*  39*  66*   < >   --    < >   --    CR   --   6.99*  7.91*  5.95*  9.50*   < >   --    < >   --    GLC   --   93  92  151*  101*   < >   --    < >   --    TRINI   --   7.9*  8.8  8.8  9.1   < >   --    < >   --    MAG  2.5*  1.5*  1.7  1.7  2.2   < >  1.7   < >  2.0   PHOS   --    --    --    --   3.9   --   3.5   --   1.9*    < > = values in this interval not displayed.       CBC -   Recent Labs   Lab Test  06/02/18   0611 05/31/18   0502  05/30/18   0525   WBC  6.5  7.0  8.3   HGB  8.5*  8.4*  8.9*   PLT  287  249  300       LFTs -   Recent Labs   Lab Test  04/16/18   1546  03/07/18   0833  02/19/18   1558  02/02/18   1736   ALKPHOS  123  129  102  86   BILITOTAL  0.8  0.7  0.6  0.4   ALT  22  21  15  16   AST  17  18  18  20   PROTTOTAL  7.4   --   7.2  6.2*   ALBUMIN  3.5   --   2.7*  2.1*       Iron Panel -   Recent Labs   Lab Test  05/08/18   0954  07/19/17   1306  07/05/17   1204   IRON  58  46  26*   IRONSAT  27  18  12*   ALBERTINA  621*  369  542*         Imaging:  All imaging studies reviewed by me.     Current Medications:    albuterol  2.5 mg Nebulization At Bedtime     allopurinol  100 mg Oral Daily     aspirin  81 mg Oral Daily     atorvastatin  40 mg Oral Daily     cetirizine  10 mg Oral Daily     cyanocolbalamin  100 mcg Oral Daily     fluticasone  1-2 spray Both Nostrils Daily     gelatin absorbable  1 each Topical During Hemodialysis (from stock)     hydrALAZINE  25 mg Oral 4 times per day on Sun Mon Wed Fri    And     hydrALAZINE  10 mg Oral 2 times  per day on Tue Thu Sat     insulin aspart   Subcutaneous QAM AC     insulin aspart   Subcutaneous Daily with lunch     insulin aspart   Subcutaneous Daily with supper     insulin aspart  1-10 Units Subcutaneous TID AC     insulin aspart  1-7 Units Subcutaneous At Bedtime     insulin glargine  10 Units Subcutaneous QAM AC     isosorbide dinitrate  10 mg Oral 3 times per day on Sun Mon Wed Fri     NEPHROCAPS  1 capsule Oral Daily     omeprazole  20 mg Oral Daily     polyethylene glycol  17 g Oral Daily     senna-docusate  1 tablet Oral BID     sodium chloride (PF)  10 mL Intracatheter Q7 Days     sodium chloride (PF)  3 mL Intracatheter During Hemodialysis (from stock)     sodium chloride (PF)  3 mL Intracatheter During Hemodialysis (from stock)     sodium chloride (PF)  3 mL Intracatheter Q8H     vitamin  B-1  100 mg Oral Daily       DOBUTamine 2.5 mcg/kg/min (06/02/18 1300)     DOPamine 2.5 mcg/kg/min (06/02/18 1300)     heparin (porcine) 500 Units/hr (06/02/18 1135)     - MEDICATION INSTRUCTIONS -       Reason ACE/ARB/ARNI order not selected       Reason beta blocker order not selected       Bouchra Saunders MD     I was present with the fellow during the history and exam.  I discussed the case with the fellow and agree with the findings as documented in the assessment and plan.  Radha Arcos

## 2018-06-02 NOTE — PLAN OF CARE
Problem: Cardiac: Heart Failure (Adult)  Goal: Signs and Symptoms of Listed Potential Problems Will be Absent, Minimized or Managed (Cardiac: Heart Failure)  Signs and symptoms of listed potential problems will be absent, minimized or managed by discharge/transition of care (reference Cardiac: Heart Failure (Adult) CPG).   Outcome: No Change  Pt remains alert and oriented x4; VSS; RA during day and 2.5L NC at night. Continuing dobutamine at 2.5 mcg/kg/min and dopamine at 2.5 mcg/kg/min. CVP 5; PAP 61/36; Sv02 60; CI 2.7. Will continue to assess and notify MD of any changes.     Problem: Diabetes Comorbidity  Goal: Diabetes  Patient comorbidity will be monitored for signs and symptoms of hyperglycemia or hypoglycemia. Problems will be absent, minimized or managed by discharge/transition of care.   Outcome: Improving  SSI. Bg 89 at 2200.     Problem: Renal Insufficiency Comorbidity  Goal: Renal Insufficiency  Patient comorbidity will be monitored for signs and symptoms of Renal Insufficiency (Chronic) condition.  Problems will be absent, minimized or managed by discharge/transition of care.   Outcome: No Change  Anuric. Plan for dialysis today.

## 2018-06-02 NOTE — PLAN OF CARE
Problem: Patient Care Overview  Goal: Plan of Care/Patient Progress Review  Outcome: No Change     No acute events this shift.      Neuro: A&Ox4. No deficits. Independent.  CV: -110, normotensive. CVP 10, PA 44/24, CI 3.2, .  Resp: Room air, lungs CTA  GI: BM(+)  : Anuric, on HD  Skin: Intact.  Lines: L DL PICC, L IJ [place] @ 64cm.   Drips:  -Dobutamine 2.5  -Dopamine 2.5

## 2018-06-03 LAB
ANION GAP SERPL CALCULATED.3IONS-SCNC: 9 MMOL/L (ref 3–14)
BASE EXCESS BLDV CALC-SCNC: 0.5 MMOL/L
BASE EXCESS BLDV CALC-SCNC: 2.7 MMOL/L
BUN SERPL-MCNC: 28 MG/DL (ref 7–30)
CALCIUM SERPL-MCNC: 8.8 MG/DL (ref 8.5–10.1)
CHLORIDE SERPL-SCNC: 104 MMOL/L (ref 94–109)
CO2 SERPL-SCNC: 26 MMOL/L (ref 20–32)
CREAT SERPL-MCNC: 5.1 MG/DL (ref 0.66–1.25)
ERYTHROCYTE [DISTWIDTH] IN BLOOD BY AUTOMATED COUNT: 16.8 % (ref 10–15)
GFR SERPL CREATININE-BSD FRML MDRD: 12 ML/MIN/1.7M2
GLUCOSE BLDC GLUCOMTR-MCNC: 108 MG/DL (ref 70–99)
GLUCOSE BLDC GLUCOMTR-MCNC: 114 MG/DL (ref 70–99)
GLUCOSE BLDC GLUCOMTR-MCNC: 122 MG/DL (ref 70–99)
GLUCOSE BLDC GLUCOMTR-MCNC: 131 MG/DL (ref 70–99)
GLUCOSE BLDC GLUCOMTR-MCNC: 161 MG/DL (ref 70–99)
GLUCOSE SERPL-MCNC: 124 MG/DL (ref 70–99)
HCO3 BLDV-SCNC: 25 MMOL/L (ref 21–28)
HCO3 BLDV-SCNC: 28 MMOL/L (ref 21–28)
HCT VFR BLD AUTO: 29.7 % (ref 40–53)
HGB BLD-MCNC: 9.4 G/DL (ref 13.3–17.7)
MAGNESIUM SERPL-MCNC: 2 MG/DL (ref 1.6–2.3)
MCH RBC QN AUTO: 29.3 PG (ref 26.5–33)
MCHC RBC AUTO-ENTMCNC: 31.6 G/DL (ref 31.5–36.5)
MCV RBC AUTO: 93 FL (ref 78–100)
O2/TOTAL GAS SETTING VFR VENT: 21 %
O2/TOTAL GAS SETTING VFR VENT: 21 %
OXYHGB MFR BLDV: 59 %
OXYHGB MFR BLDV: 63 %
PCO2 BLDV: 40 MM HG (ref 40–50)
PCO2 BLDV: 43 MM HG (ref 40–50)
PH BLDV: 7.41 PH (ref 7.32–7.43)
PH BLDV: 7.42 PH (ref 7.32–7.43)
PHOSPHATE SERPL-MCNC: 4.2 MG/DL (ref 2.5–4.5)
PLATELET # BLD AUTO: 308 10E9/L (ref 150–450)
PO2 BLDV: 36 MM HG (ref 25–47)
PO2 BLDV: 37 MM HG (ref 25–47)
POTASSIUM SERPL-SCNC: 4.9 MMOL/L (ref 3.4–5.3)
RBC # BLD AUTO: 3.21 10E12/L (ref 4.4–5.9)
SODIUM SERPL-SCNC: 139 MMOL/L (ref 133–144)
WBC # BLD AUTO: 5.5 10E9/L (ref 4–11)

## 2018-06-03 PROCEDURE — A9270 NON-COVERED ITEM OR SERVICE: HCPCS | Mod: GY | Performed by: STUDENT IN AN ORGANIZED HEALTH CARE EDUCATION/TRAINING PROGRAM

## 2018-06-03 PROCEDURE — 25000125 ZZHC RX 250: Performed by: STUDENT IN AN ORGANIZED HEALTH CARE EDUCATION/TRAINING PROGRAM

## 2018-06-03 PROCEDURE — 25000132 ZZH RX MED GY IP 250 OP 250 PS 637: Performed by: STUDENT IN AN ORGANIZED HEALTH CARE EDUCATION/TRAINING PROGRAM

## 2018-06-03 PROCEDURE — 40000048 ZZH STATISTIC DAILY SWAN MONITORING

## 2018-06-03 PROCEDURE — 25000132 ZZH RX MED GY IP 250 OP 250 PS 637: Performed by: INTERNAL MEDICINE

## 2018-06-03 PROCEDURE — 84100 ASSAY OF PHOSPHORUS: CPT | Performed by: INTERNAL MEDICINE

## 2018-06-03 PROCEDURE — 00000146 ZZHCL STATISTIC GLUCOSE BY METER IP

## 2018-06-03 PROCEDURE — 85027 COMPLETE CBC AUTOMATED: CPT | Performed by: INTERNAL MEDICINE

## 2018-06-03 PROCEDURE — 83735 ASSAY OF MAGNESIUM: CPT | Performed by: INTERNAL MEDICINE

## 2018-06-03 PROCEDURE — 99232 SBSQ HOSP IP/OBS MODERATE 35: CPT | Mod: GC | Performed by: INTERNAL MEDICINE

## 2018-06-03 PROCEDURE — 25000132 ZZH RX MED GY IP 250 OP 250 PS 637: Performed by: NURSE PRACTITIONER

## 2018-06-03 PROCEDURE — A9270 NON-COVERED ITEM OR SERVICE: HCPCS | Mod: GY | Performed by: INTERNAL MEDICINE

## 2018-06-03 PROCEDURE — 25000128 H RX IP 250 OP 636: Performed by: NURSE PRACTITIONER

## 2018-06-03 PROCEDURE — 82805 BLOOD GASES W/O2 SATURATION: CPT | Performed by: INTERNAL MEDICINE

## 2018-06-03 PROCEDURE — A9270 NON-COVERED ITEM OR SERVICE: HCPCS | Mod: GY | Performed by: NURSE PRACTITIONER

## 2018-06-03 PROCEDURE — 25000128 H RX IP 250 OP 636: Performed by: STUDENT IN AN ORGANIZED HEALTH CARE EDUCATION/TRAINING PROGRAM

## 2018-06-03 PROCEDURE — 40000196 ZZH STATISTIC RAPCV CVP MONITORING

## 2018-06-03 PROCEDURE — 80048 BASIC METABOLIC PNL TOTAL CA: CPT | Performed by: INTERNAL MEDICINE

## 2018-06-03 PROCEDURE — 25000132 ZZH RX MED GY IP 250 OP 250 PS 637: Mod: GY | Performed by: STUDENT IN AN ORGANIZED HEALTH CARE EDUCATION/TRAINING PROGRAM

## 2018-06-03 PROCEDURE — 25000132 ZZH RX MED GY IP 250 OP 250 PS 637: Mod: GY | Performed by: NURSE PRACTITIONER

## 2018-06-03 PROCEDURE — 20000004 ZZH R&B ICU UMMC

## 2018-06-03 RX ADMIN — INSULIN GLARGINE 10 UNITS: 100 INJECTION, SOLUTION SUBCUTANEOUS at 07:32

## 2018-06-03 RX ADMIN — CETIRIZINE HYDROCHLORIDE 10 MG: 10 TABLET, FILM COATED ORAL at 07:23

## 2018-06-03 RX ADMIN — ASPIRIN 81 MG CHEWABLE TABLET 81 MG: 81 TABLET CHEWABLE at 20:12

## 2018-06-03 RX ADMIN — ISOSORBIDE DINITRATE 10 MG: 10 TABLET ORAL at 07:32

## 2018-06-03 RX ADMIN — DOPAMINE HYDROCHLORIDE IN DEXTROSE 2.5 MCG/KG/MIN: 1.6 INJECTION, SOLUTION INTRAVENOUS at 15:06

## 2018-06-03 RX ADMIN — ISOSORBIDE DINITRATE 10 MG: 10 TABLET ORAL at 20:15

## 2018-06-03 RX ADMIN — VITAMIN B12 0.1 MG ORAL TABLET 100 MCG: 0.1 TABLET ORAL at 07:23

## 2018-06-03 RX ADMIN — ATORVASTATIN CALCIUM 40 MG: 40 TABLET, FILM COATED ORAL at 20:12

## 2018-06-03 RX ADMIN — Medication 100 MG: at 07:23

## 2018-06-03 RX ADMIN — Medication 25 MG: at 14:31

## 2018-06-03 RX ADMIN — Medication 25 MG: at 07:32

## 2018-06-03 RX ADMIN — ALBUTEROL SULFATE 2.5 MG: 2.5 SOLUTION RESPIRATORY (INHALATION) at 21:33

## 2018-06-03 RX ADMIN — Medication 25 MG: at 18:16

## 2018-06-03 RX ADMIN — FLUTICASONE PROPIONATE 2 SPRAY: 50 SPRAY, METERED NASAL at 20:13

## 2018-06-03 RX ADMIN — ISOSORBIDE DINITRATE 10 MG: 10 TABLET ORAL at 14:31

## 2018-06-03 RX ADMIN — INSULIN ASPART: 100 INJECTION, SOLUTION INTRAVENOUS; SUBCUTANEOUS at 15:04

## 2018-06-03 RX ADMIN — Medication 1 CAPSULE: at 07:23

## 2018-06-03 RX ADMIN — Medication 25 MG: at 22:02

## 2018-06-03 RX ADMIN — INSULIN ASPART 1 UNITS: 100 INJECTION, SOLUTION INTRAVENOUS; SUBCUTANEOUS at 19:06

## 2018-06-03 RX ADMIN — OMEPRAZOLE 20 MG: 20 CAPSULE, DELAYED RELEASE ORAL at 20:12

## 2018-06-03 RX ADMIN — MAGNESIUM SULFATE HEPTAHYDRATE 2 G: 40 INJECTION, SOLUTION INTRAVENOUS at 07:44

## 2018-06-03 RX ADMIN — ALLOPURINOL 100 MG: 100 TABLET ORAL at 07:23

## 2018-06-03 NOTE — PROGRESS NOTES
Cardiology Progress Note    Assessment & Plan   Assessment and Plan:  63 year old male with a PMH of CAD, ICM (EF of 15-20%), ESRD, bicuspid aortic valve with AI, severe MR, and proximal AAA who was admitted for decompensated heart failure. He is on dobutamine 2.5 mcg/kg/min and listed for heart/kidney transplant as status 1A.    Major Changes:  - none, continue dobutamine and dopamine at current doses      # Stage D NYHA Class III systolic heart failure secondary to ICM, listed 1A  # Severe mitral regurgitation  # Severe holodiastolic aortic insufficiency   # Non-sustained ventricular tachycardia (minimal)  - dobutamine 2.5 mcg/kg/min, dopamine 2.5 mcg/kg/min  - hydralazine 25 mg q6 hours on non-dialysis days + 10 mg BID on dialysis days (after HD)  - isordil 10 mg TID on non-HD days  - weekly CXR (next 6/4/18) for [place] position  - nephrology challenging dry weight       # Chronic Medical Issues:  - Seborrheic keratoses / Dermatofibromas / Multiple benign nevi / Nummular dermatitis: derm consulted, all lesions benign  - Hx of allergic rhinitis: cetrizine  - ESRD: Dialysis T, Th, Sa   - Non-obstructive CAD, Anomalous RCA: ASA 81 mg, atorvastatin 40 mg daily  - Ascending aortic aneurysm: 4.5 x 4.5 cm proximal ascending aortic aneurysm seen on MRI 2/14  - Multiple benign branch duct-IPMNs: no concerning features affecting transplant candidacy  - gout: allopurinol, prednisone 6/26 - 5/31 (tapered for acute flare)      FEN: regular diet, 2L FR  PROPHY: ambulation  LINES: PICC  DISPO: TBD  CODE STATUS:  Full    Case discussed with Dr. Fabio Soriano  Cardiology fellow, PGY-4    Interval History   No acute events    afebrile /60-70's   On 2.5 dobutamine and 2.5 dopamine   Dialyzed yesterday net negative 2.1L, feels well    Physical Exam   Temp: 98.5  F (36.9  C) Temp src: Oral BP: 100/62   Heart Rate: 107 Resp: 14 SpO2: 100 % O2 Device: None (Room air) Oxygen Delivery: 2 LPM  Vitals:     "06/01/18 0700 06/02/18 0700 06/03/18 0700   Weight: 76.6 kg (168 lb 15.7 oz) 78 kg (171 lb 15.3 oz) 76.1 kg (167 lb 10.6 oz)     Vital Signs with Ranges  Temp:  [97.6  F (36.4  C)-99.8  F (37.7  C)] 98.5  F (36.9  C)  Heart Rate:  [102-112] 107  Resp:  [14-23] 14  BP: ()/(59-77) 100/62  Cuff Mean (mmHg):  [73-91] 73  SpO2:  [97 %-100 %] 100 %  I/O last 3 completed shifts:  In: 1186.4 [P.O.:780; I.V.:406.4]  Out: 3300 [Other:3300]    Heart Rate: 107, Blood pressure 100/62, pulse 107, temperature 98.5  F (36.9  C), temperature source Oral, resp. rate 14, height 1.727 m (5' 8\"), weight 76.1 kg (167 lb 10.6 oz), SpO2 100 %.  167 lbs 10.56 oz  GEN:  Alert, oriented x 3 eating breakfast, appears comfortable, NAD.  CV:  Regular rate and rhythm, no murmur JVD 2cm  LUNGS:  Clear to auscultation bilaterally   ABD:  Active bowel sounds, soft, non-tender/non-distended.  No rebound/guarding/rigidity.  EXT:  No edema or cyanosis.      Medications     DOBUTamine 2.5 mcg/kg/min (06/03/18 0700)     DOPamine 2.5 mcg/kg/min (06/03/18 0700)     - MEDICATION INSTRUCTIONS -       Reason ACE/ARB/ARNI order not selected       Reason beta blocker order not selected         albuterol  2.5 mg Nebulization At Bedtime     allopurinol  100 mg Oral Daily     alteplase  1 mg Intracatheter Once     aspirin  81 mg Oral Daily     atorvastatin  40 mg Oral Daily     cetirizine  10 mg Oral Daily     cyanocolbalamin  100 mcg Oral Daily     fluticasone  1-2 spray Both Nostrils Daily     hydrALAZINE  25 mg Oral 4 times per day on Sun Mon Wed Fri    And     hydrALAZINE  10 mg Oral 2 times per day on Tue Thu Sat     insulin aspart   Subcutaneous QAM AC     insulin aspart   Subcutaneous Daily with lunch     insulin aspart   Subcutaneous Daily with supper     insulin aspart  1-10 Units Subcutaneous TID AC     insulin aspart  1-7 Units Subcutaneous At Bedtime     insulin glargine  10 Units Subcutaneous QAM AC     isosorbide dinitrate  10 mg Oral 3 times " per day on Sun Mon Wed Fri     NEPHROCAPS  1 capsule Oral Daily     omeprazole  20 mg Oral Daily     polyethylene glycol  17 g Oral Daily     senna-docusate  1 tablet Oral BID     sodium chloride (PF)  10 mL Intracatheter Q7 Days     sodium chloride (PF)  3 mL Intracatheter Q8H     vitamin  B-1  100 mg Oral Daily       Data     Recent Labs  Lab 06/03/18  0436 06/02/18  0611 05/31/18  0502   WBC 5.5 6.5 7.0   HGB 9.4* 8.5* 8.4*   MCV 93 92 93    287 249    140 136   POTASSIUM 4.9 4.4 4.3   CHLORIDE 104 107 100   CO2 26 22 25   BUN 28 50* 55*   CR 5.10* 6.99* 7.91*   ANIONGAP 9 12 11   TRINI 8.8 7.9* 8.8   * 93 92       No results found for this or any previous visit (from the past 24 hour(s)).    Attending Attestation:  Patient seen and examined by me with the team. I have performed all pertinent elements of the physical examination and reviewed the note above. I have reviewed pertinent laboratory, echocardiographic, imaging, and cardiac catheterization results. I agree with the plan of care as described in this note.    Murray Mccarthy MD, PhD

## 2018-06-03 NOTE — PLAN OF CARE
Problem: Patient Care Overview  Goal: Plan of Care/Patient Progress Review  Outcome: No Change    Pt hypotensive and slightly lightheaded after ambulating around 4th floor and outside. Symptoms resolved and BP returned to normal after about 30 minutes without intervention.     Neuro: A&Ox4, no deficits.  CV: SR/ST . CVP 10, PA 38/16, CI 2.4, SVR 1194  Resp: CTA, room air. SOB with activity.   GI: Regular diet, 2L fluid restriction. BM (+)  : Anuric, on HD  Endo: -131. Mag 2.0, replaced with 2g.   Drips:  -Dopamine 2.5  -Dobutamine 2.5

## 2018-06-03 NOTE — PLAN OF CARE
Problem: Patient Care Overview  Goal: Plan of Care/Patient Progress Review  Outcome: No Change  D/I: CVP=8 . PA= 46/22 . BECKA CI = 2.6 & SVR = 1051. Continues on Dobutamine Gtt @ 2.5 mcg/kg/min and Dopamine Gtt @ 2.5  mcg/kg/min.    A/P: Uneventful shift. Continues on status 1A transplant list.

## 2018-06-04 ENCOUNTER — APPOINTMENT (OUTPATIENT)
Dept: OCCUPATIONAL THERAPY | Facility: CLINIC | Age: 63
DRG: 001 | End: 2018-06-04
Attending: INTERNAL MEDICINE
Payer: COMMERCIAL

## 2018-06-04 LAB
BASE DEFICIT BLDV-SCNC: 0.1 MMOL/L
BASE DEFICIT BLDV-SCNC: 1.3 MMOL/L
GLUCOSE BLDC GLUCOMTR-MCNC: 119 MG/DL (ref 70–99)
GLUCOSE BLDC GLUCOMTR-MCNC: 120 MG/DL (ref 70–99)
GLUCOSE BLDC GLUCOMTR-MCNC: 136 MG/DL (ref 70–99)
GLUCOSE BLDC GLUCOMTR-MCNC: 194 MG/DL (ref 70–99)
HCO3 BLDV-SCNC: 23 MMOL/L (ref 21–28)
HCO3 BLDV-SCNC: 24 MMOL/L (ref 21–28)
O2/TOTAL GAS SETTING VFR VENT: 21 %
O2/TOTAL GAS SETTING VFR VENT: 21 %
OXYHGB MFR BLDV: 53 %
OXYHGB MFR BLDV: 59 %
PCO2 BLDV: 36 MM HG (ref 40–50)
PCO2 BLDV: 38 MM HG (ref 40–50)
PH BLDV: 7.41 PH (ref 7.32–7.43)
PH BLDV: 7.41 PH (ref 7.32–7.43)
PO2 BLDV: 32 MM HG (ref 25–47)
PO2 BLDV: 35 MM HG (ref 25–47)

## 2018-06-04 PROCEDURE — 40000196 ZZH STATISTIC RAPCV CVP MONITORING

## 2018-06-04 PROCEDURE — A9270 NON-COVERED ITEM OR SERVICE: HCPCS | Mod: GY | Performed by: STUDENT IN AN ORGANIZED HEALTH CARE EDUCATION/TRAINING PROGRAM

## 2018-06-04 PROCEDURE — 25000128 H RX IP 250 OP 636: Performed by: INTERNAL MEDICINE

## 2018-06-04 PROCEDURE — 97110 THERAPEUTIC EXERCISES: CPT | Mod: GO | Performed by: OCCUPATIONAL THERAPIST

## 2018-06-04 PROCEDURE — 25000132 ZZH RX MED GY IP 250 OP 250 PS 637: Performed by: NURSE PRACTITIONER

## 2018-06-04 PROCEDURE — 99232 SBSQ HOSP IP/OBS MODERATE 35: CPT | Mod: GC | Performed by: INTERNAL MEDICINE

## 2018-06-04 PROCEDURE — 25000132 ZZH RX MED GY IP 250 OP 250 PS 637: Performed by: STUDENT IN AN ORGANIZED HEALTH CARE EDUCATION/TRAINING PROGRAM

## 2018-06-04 PROCEDURE — 82805 BLOOD GASES W/O2 SATURATION: CPT | Performed by: INTERNAL MEDICINE

## 2018-06-04 PROCEDURE — A9270 NON-COVERED ITEM OR SERVICE: HCPCS | Mod: GY | Performed by: NURSE PRACTITIONER

## 2018-06-04 PROCEDURE — 00000146 ZZHCL STATISTIC GLUCOSE BY METER IP

## 2018-06-04 PROCEDURE — 20000004 ZZH R&B ICU UMMC

## 2018-06-04 PROCEDURE — 25000128 H RX IP 250 OP 636: Performed by: STUDENT IN AN ORGANIZED HEALTH CARE EDUCATION/TRAINING PROGRAM

## 2018-06-04 PROCEDURE — 40000133 ZZH STATISTIC OT WARD VISIT: Performed by: OCCUPATIONAL THERAPIST

## 2018-06-04 PROCEDURE — 40000048 ZZH STATISTIC DAILY SWAN MONITORING

## 2018-06-04 PROCEDURE — 25000132 ZZH RX MED GY IP 250 OP 250 PS 637: Performed by: INTERNAL MEDICINE

## 2018-06-04 PROCEDURE — A9270 NON-COVERED ITEM OR SERVICE: HCPCS | Mod: GY | Performed by: INTERNAL MEDICINE

## 2018-06-04 PROCEDURE — 25000132 ZZH RX MED GY IP 250 OP 250 PS 637: Mod: GY | Performed by: NURSE PRACTITIONER

## 2018-06-04 RX ADMIN — ALLOPURINOL 100 MG: 100 TABLET ORAL at 07:44

## 2018-06-04 RX ADMIN — CETIRIZINE HYDROCHLORIDE 10 MG: 10 TABLET, FILM COATED ORAL at 07:43

## 2018-06-04 RX ADMIN — Medication 1 CAPSULE: at 07:43

## 2018-06-04 RX ADMIN — Medication 25 MG: at 18:15

## 2018-06-04 RX ADMIN — VITAMIN B12 0.1 MG ORAL TABLET 100 MCG: 0.1 TABLET ORAL at 07:43

## 2018-06-04 RX ADMIN — Medication 25 MG: at 21:56

## 2018-06-04 RX ADMIN — Medication 25 MG: at 07:49

## 2018-06-04 RX ADMIN — ISOSORBIDE DINITRATE 10 MG: 10 TABLET ORAL at 20:16

## 2018-06-04 RX ADMIN — INSULIN ASPART 3 UNITS: 100 INJECTION, SOLUTION INTRAVENOUS; SUBCUTANEOUS at 14:10

## 2018-06-04 RX ADMIN — INSULIN GLARGINE 10 UNITS: 100 INJECTION, SOLUTION SUBCUTANEOUS at 08:47

## 2018-06-04 RX ADMIN — ATORVASTATIN CALCIUM 40 MG: 40 TABLET, FILM COATED ORAL at 20:13

## 2018-06-04 RX ADMIN — Medication 100 MG: at 07:43

## 2018-06-04 RX ADMIN — OMEPRAZOLE 20 MG: 20 CAPSULE, DELAYED RELEASE ORAL at 20:13

## 2018-06-04 RX ADMIN — ISOSORBIDE DINITRATE 10 MG: 10 TABLET ORAL at 07:49

## 2018-06-04 RX ADMIN — DOPAMINE HYDROCHLORIDE IN DEXTROSE 2.5 MCG/KG/MIN: 1.6 INJECTION, SOLUTION INTRAVENOUS at 21:57

## 2018-06-04 RX ADMIN — Medication 25 MG: at 12:17

## 2018-06-04 RX ADMIN — ISOSORBIDE DINITRATE 10 MG: 10 TABLET ORAL at 13:39

## 2018-06-04 RX ADMIN — ASPIRIN 81 MG CHEWABLE TABLET 81 MG: 81 TABLET CHEWABLE at 20:13

## 2018-06-04 RX ADMIN — DOBUTAMINE HYDROCHLORIDE 2.5 MCG/KG/MIN: 400 INJECTION INTRAVENOUS at 17:36

## 2018-06-04 RX ADMIN — FLUTICASONE PROPIONATE 2 SPRAY: 50 SPRAY, METERED NASAL at 20:14

## 2018-06-04 NOTE — PLAN OF CARE
Problem: Cardiac: Heart Failure (Adult)  Goal: Signs and Symptoms of Listed Potential Problems Will be Absent, Minimized or Managed (Cardiac: Heart Failure)  Signs and symptoms of listed potential problems will be absent, minimized or managed by discharge/transition of care (reference Cardiac: Heart Failure (Adult) CPG).   Outcome: Improving  Pt remains alert and oriented x4; VSS; room air during the day and 2.5L NC at night per pt request. PAP 58/24; SV02 53; CI 2.1; SVR 1281. Dobutamine and dopamine continued. One BM overnight. Will continue to assess and notify MD of any changes.     Problem: Diabetes Comorbidity  Goal: Diabetes  Patient comorbidity will be monitored for signs and symptoms of hyperglycemia or hypoglycemia. Problems will be absent, minimized or managed by discharge/transition of care.   Outcome: No Change  Managed with SSI.     Problem: Renal Insufficiency Comorbidity  Goal: Renal Insufficiency  Patient comorbidity will be monitored for signs and symptoms of Renal Insufficiency (Chronic) condition.  Problems will be absent, minimized or managed by discharge/transition of care.   Outcome: No Change  Anuric; dialysis per schedule; last 6/2.

## 2018-06-04 NOTE — PLAN OF CARE
Problem: Patient Care Overview  Goal: Plan of Care/Patient Progress Review  Outcome: No Change  D: Heart failure; Listed status 1a heart transplant  I/A: Pt AOx4 today, neuro intact. Pt blood pressure stable. 's sinus tachycardia. Afebrile. Dobutamine and dopamine drips continued today. Hemodynamics stable: PA 50/24, CVP 10, CI 2.4, SVO2 59, SVR 1200's. Pt on room air, SpO2 100%. Pt had BM x2 today. Pt walked in hallway with therapy and nursing today, tolerated well.  P: Continue to monitor hemodynamics per order. Report to be given to oncoming RN.

## 2018-06-04 NOTE — PROGRESS NOTES
Cardiology Progress Note    Assessment & Plan   Assessment and Plan:  63 year old male with a PMH of CAD, ICM (EF of 15-20%), ESRD, bicuspid aortic valve with AI, severe MR, and proximal AAA who was admitted for decompensated heart failure. He is on dobutamine 2.5 mcg/kg/min and listed for heart/kidney transplant as status 1A.    Major Changes:  - none, continue dobutamine and dopamine at current doses    # Stage D NYHA Class III systolic heart failure secondary to ICM, listed 1A  # Severe mitral regurgitation  # Severe holodiastolic aortic insufficiency   # Non-sustained ventricular tachycardia (minimal)  - dobutamine 2.5 mcg/kg/min, dopamine 2.5 mcg/kg/min  - hydralazine 25 mg q6 hours on non-dialysis days + 10 mg BID on dialysis days (after HD)  - isordil 10 mg TID on non-HD days  - weekly CXR (next 6/4/18) for [place] position  - nephrology challenging dry weight       # Chronic Medical Issues:  - Seborrheic keratoses / Dermatofibromas / Multiple benign nevi / Nummular dermatitis: derm consulted, all lesions benign  - Hx of allergic rhinitis: cetrizine  - ESRD: Dialysis T, Th, Sa   - Non-obstructive CAD, Anomalous RCA: ASA 81 mg, atorvastatin 40 mg daily  - Ascending aortic aneurysm: 4.5 x 4.5 cm proximal ascending aortic aneurysm seen on MRI 2/14  - Multiple benign branch duct-IPMNs: no concerning features affecting transplant candidacy  - gout: allopurinol, prednisone 6/26 - 5/31 (tapered for acute flare)      FEN: regular diet, 2L FR  PROPHY: ambulation  LINES: PICC  DISPO: TBD  CODE STATUS:  Full    Case discussed with Dr. Fabio Coffey  Cardiology Fellow      Interval History   No acute events    afebrile /60-70's   On 2.5 dobutamine and 2.5 dopamine   Dialyzed yesterday net negative 2.1L, feels well    Physical Exam   Temp: 97.9  F (36.6  C) Temp src: Oral BP: 104/64   Heart Rate: 103 Resp: 15 SpO2: 100 % O2 Device: None (Room air)    Vitals:    06/02/18 0700 06/03/18 0700 06/04/18  "0800   Weight: 78 kg (171 lb 15.3 oz) 76.1 kg (167 lb 10.6 oz) 78 kg (171 lb 15.3 oz)     Vital Signs with Ranges  Temp:  [97.6  F (36.4  C)-98.6  F (37  C)] 97.9  F (36.6  C)  Heart Rate:  [] 103  Resp:  [14-22] 15  BP: ()/(46-75) 104/64  FiO2 (%):  [2.5 %] 2.5 %  SpO2:  [99 %-100 %] 100 %  I/O last 3 completed shifts:  In: 1776.4 [P.O.:1340; I.V.:436.4]  Out: -     Heart Rate: 103, Blood pressure 104/64, pulse 107, temperature 97.9  F (36.6  C), temperature source Oral, resp. rate 15, height 1.727 m (5' 8\"), weight 78 kg (171 lb 15.3 oz), SpO2 100 %.  171 lbs 15.34 oz  GEN:  Alert, oriented x 3 eating breakfast, appears comfortable, NAD.  CV:  Regular rate and rhythm, no murmur JVD 2cm  LUNGS:  Clear to auscultation bilaterally   ABD:  Active bowel sounds, soft, non-tender/non-distended.  No rebound/guarding/rigidity.  EXT:  No edema or cyanosis.      Medications     DOBUTamine 2.5 mcg/kg/min (06/04/18 1000)     DOPamine 2.5 mcg/kg/min (06/04/18 1000)     - MEDICATION INSTRUCTIONS -       Reason ACE/ARB/ARNI order not selected       Reason beta blocker order not selected         albuterol  2.5 mg Nebulization At Bedtime     allopurinol  100 mg Oral Daily     alteplase  1 mg Intracatheter Once     aspirin  81 mg Oral Daily     atorvastatin  40 mg Oral Daily     cetirizine  10 mg Oral Daily     cyanocolbalamin  100 mcg Oral Daily     fluticasone  1-2 spray Both Nostrils Daily     hydrALAZINE  25 mg Oral 4 times per day on Sun Mon Wed Fri    And     hydrALAZINE  10 mg Oral 2 times per day on Tue Thu Sat     insulin aspart   Subcutaneous QAM AC     insulin aspart   Subcutaneous Daily with lunch     insulin aspart   Subcutaneous Daily with supper     insulin aspart  1-10 Units Subcutaneous TID AC     insulin aspart  1-7 Units Subcutaneous At Bedtime     insulin glargine  10 Units Subcutaneous QAM AC     isosorbide dinitrate  10 mg Oral 3 times per day on Sun Mon Wed Fri     NEPHROCAPS  1 capsule Oral Daily "     omeprazole  20 mg Oral Daily     polyethylene glycol  17 g Oral Daily     senna-docusate  1 tablet Oral BID     sodium chloride (PF)  10 mL Intracatheter Q7 Days     sodium chloride (PF)  3 mL Intracatheter Q8H     vitamin  B-1  100 mg Oral Daily       Data     Recent Labs  Lab 06/03/18  0436 06/02/18  0611 05/31/18  0502   WBC 5.5 6.5 7.0   HGB 9.4* 8.5* 8.4*   MCV 93 92 93    287 249    140 136   POTASSIUM 4.9 4.4 4.3   CHLORIDE 104 107 100   CO2 26 22 25   BUN 28 50* 55*   CR 5.10* 6.99* 7.91*   ANIONGAP 9 12 11   TRINI 8.8 7.9* 8.8   * 93 92       No results found for this or any previous visit (from the past 24 hour(s)).    Attending Attestation:  Patient seen and examined by me with the team. I have performed all pertinent elements of the physical examination and reviewed the note above. I have reviewed pertinent laboratory, echocardiographic, imaging, and cardiac catheterization results. I agree with the plan of care as described in this note.    Murray Mccarthy MD, PhD

## 2018-06-04 NOTE — PLAN OF CARE
"Problem: Patient Care Overview  Goal: Plan of Care/Patient Progress Review  Discharge Planner OT   Patient plan for discharge: pending progress  Current status:  Pt completed in room and in núñez ambulation to increase ind and functional endurance in ADLS/IADLS. Pt's BP pre ambulation 95/63. Pt denied any dizziness or lightheadedness. Pt ambulated over 2,000ft w/ SBA and Rn present throughout. At beginning of ambulation OT informed pt that we needed to wait for RN to walk next to us 2/2 pt having swan line. Pt began yelling loudly \"well, I dont want to wait!\" and yelling curse words. OT provided theraputic listening and pt increased yelling. Pt did calm down and quit yelling. Pt continued ambulation w/ SBA. pt's HR increased to 120s at highest. Pt stating \"It feels good to yell at people.\" OT informed pt at end of tx session that this writer prefers for pt not to raise voice during ambulation and OT educated pt on stresse mgmt techniques. Pt angry and yelling when writer educated pt on stress mgmt techniques. Pt's VSS throughout.   Barriers to return to prior living situation: medical status  Recommendations for discharge: pending hospital plan  Rationale for recommendations: pending hospital plan       Entered by: Vangie Sandoval 06/04/2018 11:02 AM           "

## 2018-06-05 LAB
ABO + RH BLD: NORMAL
ABO + RH BLD: NORMAL
ANION GAP SERPL CALCULATED.3IONS-SCNC: 12 MMOL/L (ref 3–14)
ANION GAP SERPL CALCULATED.3IONS-SCNC: 8 MMOL/L (ref 3–14)
BASE DEFICIT BLDV-SCNC: 1.7 MMOL/L
BASE EXCESS BLDV CALC-SCNC: 5.3 MMOL/L
BLD GP AB SCN SERPL QL: NORMAL
BLOOD BANK CMNT PATIENT-IMP: NORMAL
BUN SERPL-MCNC: 23 MG/DL (ref 7–30)
BUN SERPL-MCNC: 62 MG/DL (ref 7–30)
CALCIUM SERPL-MCNC: 8.7 MG/DL (ref 8.5–10.1)
CALCIUM SERPL-MCNC: 9 MG/DL (ref 8.5–10.1)
CHLORIDE SERPL-SCNC: 101 MMOL/L (ref 94–109)
CHLORIDE SERPL-SCNC: 105 MMOL/L (ref 94–109)
CO2 SERPL-SCNC: 20 MMOL/L (ref 20–32)
CO2 SERPL-SCNC: 28 MMOL/L (ref 20–32)
CREAT SERPL-MCNC: 4.53 MG/DL (ref 0.66–1.25)
CREAT SERPL-MCNC: 9.17 MG/DL (ref 0.66–1.25)
ERYTHROCYTE [DISTWIDTH] IN BLOOD BY AUTOMATED COUNT: 17.1 % (ref 10–15)
GFR SERPL CREATININE-BSD FRML MDRD: 13 ML/MIN/1.7M2
GFR SERPL CREATININE-BSD FRML MDRD: 6 ML/MIN/1.7M2
GLUCOSE BLDC GLUCOMTR-MCNC: 146 MG/DL (ref 70–99)
GLUCOSE BLDC GLUCOMTR-MCNC: 189 MG/DL (ref 70–99)
GLUCOSE BLDC GLUCOMTR-MCNC: 75 MG/DL (ref 70–99)
GLUCOSE BLDC GLUCOMTR-MCNC: 91 MG/DL (ref 70–99)
GLUCOSE SERPL-MCNC: 100 MG/DL (ref 70–99)
GLUCOSE SERPL-MCNC: 88 MG/DL (ref 70–99)
HCO3 BLDV-SCNC: 23 MMOL/L (ref 21–28)
HCO3 BLDV-SCNC: 29 MMOL/L (ref 21–28)
HCT VFR BLD AUTO: 27.1 % (ref 40–53)
HGB BLD-MCNC: 8.6 G/DL (ref 13.3–17.7)
MAGNESIUM SERPL-MCNC: 2.3 MG/DL (ref 1.6–2.3)
MCH RBC QN AUTO: 29.4 PG (ref 26.5–33)
MCHC RBC AUTO-ENTMCNC: 31.7 G/DL (ref 31.5–36.5)
MCV RBC AUTO: 93 FL (ref 78–100)
O2/TOTAL GAS SETTING VFR VENT: 21 %
O2/TOTAL GAS SETTING VFR VENT: ABNORMAL %
OXYHGB MFR BLDV: 56 %
OXYHGB MFR BLDV: 59 %
PCO2 BLDV: 39 MM HG (ref 40–50)
PCO2 BLDV: 39 MM HG (ref 40–50)
PH BLDV: 7.39 PH (ref 7.32–7.43)
PH BLDV: 7.48 PH (ref 7.32–7.43)
PLATELET # BLD AUTO: 254 10E9/L (ref 150–450)
PO2 BLDV: 32 MM HG (ref 25–47)
PO2 BLDV: 36 MM HG (ref 25–47)
POTASSIUM SERPL-SCNC: 3.7 MMOL/L (ref 3.4–5.3)
POTASSIUM SERPL-SCNC: 5.3 MMOL/L (ref 3.4–5.3)
RBC # BLD AUTO: 2.93 10E12/L (ref 4.4–5.9)
SODIUM SERPL-SCNC: 136 MMOL/L (ref 133–144)
SODIUM SERPL-SCNC: 138 MMOL/L (ref 133–144)
SPECIMEN EXP DATE BLD: NORMAL
WBC # BLD AUTO: 6.1 10E9/L (ref 4–11)

## 2018-06-05 PROCEDURE — A9270 NON-COVERED ITEM OR SERVICE: HCPCS | Mod: GY | Performed by: STUDENT IN AN ORGANIZED HEALTH CARE EDUCATION/TRAINING PROGRAM

## 2018-06-05 PROCEDURE — 25000132 ZZH RX MED GY IP 250 OP 250 PS 637: Performed by: INTERNAL MEDICINE

## 2018-06-05 PROCEDURE — 00000146 ZZHCL STATISTIC GLUCOSE BY METER IP

## 2018-06-05 PROCEDURE — 82805 BLOOD GASES W/O2 SATURATION: CPT | Performed by: INTERNAL MEDICINE

## 2018-06-05 PROCEDURE — 40000048 ZZH STATISTIC DAILY SWAN MONITORING

## 2018-06-05 PROCEDURE — 40000275 ZZH STATISTIC RCP TIME EA 10 MIN

## 2018-06-05 PROCEDURE — 83735 ASSAY OF MAGNESIUM: CPT | Performed by: NURSE PRACTITIONER

## 2018-06-05 PROCEDURE — 85027 COMPLETE CBC AUTOMATED: CPT | Performed by: NURSE PRACTITIONER

## 2018-06-05 PROCEDURE — 86900 BLOOD TYPING SEROLOGIC ABO: CPT | Performed by: INTERNAL MEDICINE

## 2018-06-05 PROCEDURE — 25000128 H RX IP 250 OP 636: Performed by: INTERNAL MEDICINE

## 2018-06-05 PROCEDURE — 20000004 ZZH R&B ICU UMMC

## 2018-06-05 PROCEDURE — 25000132 ZZH RX MED GY IP 250 OP 250 PS 637: Mod: GY | Performed by: STUDENT IN AN ORGANIZED HEALTH CARE EDUCATION/TRAINING PROGRAM

## 2018-06-05 PROCEDURE — A9270 NON-COVERED ITEM OR SERVICE: HCPCS | Mod: GY | Performed by: NURSE PRACTITIONER

## 2018-06-05 PROCEDURE — A9270 NON-COVERED ITEM OR SERVICE: HCPCS | Mod: GY | Performed by: INTERNAL MEDICINE

## 2018-06-05 PROCEDURE — 40000196 ZZH STATISTIC RAPCV CVP MONITORING

## 2018-06-05 PROCEDURE — 90937 HEMODIALYSIS REPEATED EVAL: CPT

## 2018-06-05 PROCEDURE — 86901 BLOOD TYPING SEROLOGIC RH(D): CPT | Performed by: INTERNAL MEDICINE

## 2018-06-05 PROCEDURE — 25000125 ZZHC RX 250: Performed by: STUDENT IN AN ORGANIZED HEALTH CARE EDUCATION/TRAINING PROGRAM

## 2018-06-05 PROCEDURE — 86850 RBC ANTIBODY SCREEN: CPT | Performed by: INTERNAL MEDICINE

## 2018-06-05 PROCEDURE — 99232 SBSQ HOSP IP/OBS MODERATE 35: CPT | Mod: GC | Performed by: INTERNAL MEDICINE

## 2018-06-05 PROCEDURE — 25000132 ZZH RX MED GY IP 250 OP 250 PS 637: Performed by: NURSE PRACTITIONER

## 2018-06-05 PROCEDURE — 80048 BASIC METABOLIC PNL TOTAL CA: CPT | Performed by: NURSE PRACTITIONER

## 2018-06-05 PROCEDURE — 94640 AIRWAY INHALATION TREATMENT: CPT | Mod: 76

## 2018-06-05 PROCEDURE — 63400005 ZZH RX 634: Performed by: INTERNAL MEDICINE

## 2018-06-05 PROCEDURE — 25000132 ZZH RX MED GY IP 250 OP 250 PS 637: Mod: GY | Performed by: NURSE PRACTITIONER

## 2018-06-05 PROCEDURE — 80048 BASIC METABOLIC PNL TOTAL CA: CPT | Performed by: INTERNAL MEDICINE

## 2018-06-05 RX ORDER — AMOXICILLIN 250 MG
1 CAPSULE ORAL 2 TIMES DAILY PRN
Status: DISCONTINUED | OUTPATIENT
Start: 2018-06-05 | End: 2018-06-15

## 2018-06-05 RX ORDER — HEPARIN SODIUM 1000 [USP'U]/ML
500 INJECTION, SOLUTION INTRAVENOUS; SUBCUTANEOUS
Status: COMPLETED | OUTPATIENT
Start: 2018-06-05 | End: 2018-06-05

## 2018-06-05 RX ORDER — HEPARIN SODIUM 1000 [USP'U]/ML
500 INJECTION, SOLUTION INTRAVENOUS; SUBCUTANEOUS CONTINUOUS
Status: DISCONTINUED | OUTPATIENT
Start: 2018-06-05 | End: 2018-06-05

## 2018-06-05 RX ORDER — POLYETHYLENE GLYCOL 3350 17 G/17G
17 POWDER, FOR SOLUTION ORAL DAILY PRN
Status: DISCONTINUED | OUTPATIENT
Start: 2018-06-05 | End: 2018-06-15

## 2018-06-05 RX ADMIN — ALLOPURINOL 100 MG: 100 TABLET ORAL at 07:48

## 2018-06-05 RX ADMIN — FLUTICASONE PROPIONATE 1 SPRAY: 50 SPRAY, METERED NASAL at 20:53

## 2018-06-05 RX ADMIN — OMEPRAZOLE 20 MG: 20 CAPSULE, DELAYED RELEASE ORAL at 20:54

## 2018-06-05 RX ADMIN — SODIUM CHLORIDE 300 ML: 9 INJECTION, SOLUTION INTRAVENOUS at 09:43

## 2018-06-05 RX ADMIN — INSULIN ASPART 2 UNITS: 100 INJECTION, SOLUTION INTRAVENOUS; SUBCUTANEOUS at 12:19

## 2018-06-05 RX ADMIN — INSULIN ASPART 7 UNITS: 100 INJECTION, SOLUTION INTRAVENOUS; SUBCUTANEOUS at 13:45

## 2018-06-05 RX ADMIN — HEPARIN SODIUM 500 UNITS/HR: 1000 INJECTION, SOLUTION INTRAVENOUS; SUBCUTANEOUS at 09:16

## 2018-06-05 RX ADMIN — HEPARIN SODIUM 2100 UNITS: 1000 INJECTION, SOLUTION INTRAVENOUS; SUBCUTANEOUS at 12:50

## 2018-06-05 RX ADMIN — INSULIN GLARGINE 10 UNITS: 100 INJECTION, SOLUTION SUBCUTANEOUS at 08:12

## 2018-06-05 RX ADMIN — Medication 100 MG: at 07:48

## 2018-06-05 RX ADMIN — EPOETIN ALFA 4000 UNITS: 10000 SOLUTION INTRAVENOUS; SUBCUTANEOUS at 09:44

## 2018-06-05 RX ADMIN — Medication 1 CAPSULE: at 07:48

## 2018-06-05 RX ADMIN — CETIRIZINE HYDROCHLORIDE 10 MG: 10 TABLET, FILM COATED ORAL at 07:48

## 2018-06-05 RX ADMIN — ASPIRIN 81 MG CHEWABLE TABLET 81 MG: 81 TABLET CHEWABLE at 20:54

## 2018-06-05 RX ADMIN — HEPARIN SODIUM 500 UNITS: 1000 INJECTION, SOLUTION INTRAVENOUS; SUBCUTANEOUS at 09:17

## 2018-06-05 RX ADMIN — ATORVASTATIN CALCIUM 40 MG: 40 TABLET, FILM COATED ORAL at 20:54

## 2018-06-05 RX ADMIN — SODIUM CHLORIDE 250 ML: 9 INJECTION, SOLUTION INTRAVENOUS at 09:42

## 2018-06-05 RX ADMIN — IRON SUCROSE 50 MG: 20 INJECTION, SOLUTION INTRAVENOUS at 09:44

## 2018-06-05 RX ADMIN — HYDRALAZINE HYDROCHLORIDE 10 MG: 10 TABLET, FILM COATED ORAL at 20:54

## 2018-06-05 RX ADMIN — VITAMIN B12 0.1 MG ORAL TABLET 100 MCG: 0.1 TABLET ORAL at 07:48

## 2018-06-05 RX ADMIN — ALBUTEROL SULFATE 2.5 MG: 2.5 SOLUTION RESPIRATORY (INHALATION) at 22:04

## 2018-06-05 NOTE — PROGRESS NOTES
"  Nephrology Progress Note  06/05/2018         Assessment & Recommendations:   Murray Nicholson is a 63 year old year old male with PMH of HTN, DMII, functionally bicuspid aortic valve, CHF/CM (EF 10-15%), ESRD, admitted with worsening dyspnea/SOB, now on dobutamine and dopamine pending heart/kidney transplant.       ESRD: due to DM, on PD since Aug 2017, changed to HD 1/18, now TTS, at [facility] under care Dr Mcpherson, RI tunnel, EDW 75.5kg, 3.4 Hr.   -- HD scheduled TTS.  -- low dose heparin HD.   -- heparin lock CVC,    BP and volume: controlled and UF to dry weight, challenged dry weight to 75.5kg.     Electrolyte and acid base: no issue      Anemia: cont venofer 100 mg Qtues, cont Epo 4000 u with dialysis      BMD: stable no issue , hectorol and phoslo stopped.      Severe AV and MR/ Cardiomyopathy EF 15%: listed for heart kidney tx.      Recommendations were communicated to primary team via note          Kristi Armas PA-C  Division of Kidney Disease  [phone]    Interval History :   In the last 24 hours Murray Nicholson feels fine seen during dialysis , denies any new complaint.     Review of Systems:   I reviewed the following systems:  GI: ok  appetite. no nausea or vomiting or diarrhea.   Neuro:  no confusion  Constitutional:  no fever or chills  CV: no dyspnea or edema.  no chest pain.    Physical Exam:   I/O last 3 completed shifts:  In: 1446.4 [P.O.:1060; I.V.:386.4]  Out: -    /80  Pulse 107  Temp 97.8  F (36.6  C) (Oral)  Resp 16  Ht 1.727 m (5' 8\")  Wt 79.2 kg (174 lb 7.9 oz)  SpO2 99%  BMI 26.53 kg/m2     GENERAL APPEARANCE: NAD  EYES:  no scleral icterus, pupils equal  HENT: mouth without ulcers or lesions  PULM: lungs clear to auscultation,  bilaterally, equal air movement, no clubbing  CV: regular rhythm, normal rate     -edema no   GI: soft   INTEGUMENT: no cyanosis, no rash  NEURO:  no asterixis   Access Tunnel     Labs:   All labs reviewed by me  Electrolytes/Renal -   Recent " Labs   Lab Test  06/05/18   0553  06/03/18   0436  06/02/18   1141  06/02/18   0611   05/29/18   0434   05/26/18   1632   NA  138  139   --   140   < >  139   < >   --    POTASSIUM  5.3  4.9   --   4.4   < >  4.8   < >  4.0   CHLORIDE  105  104   --   107   < >  101   < >   --    CO2  20  26   --   22   < >  25   < >   --    BUN  62*  28   --   50*   < >  66*   < >   --    CR  9.17*  5.10*   --   6.99*   < >  9.50*   < >   --    GLC  88  124*   --   93   < >  101*   < >   --    TRINI  8.7  8.8   --   7.9*   < >  9.1   < >   --    MAG  2.3  2.0  2.5*  1.5*   < >  2.2   < >  1.7   PHOS   --   4.2   --    --    --   3.9   --   3.5    < > = values in this interval not displayed.       CBC -   Recent Labs   Lab Test  06/05/18   0553  06/03/18   0436  06/02/18   0611   WBC  6.1  5.5  6.5   HGB  8.6*  9.4*  8.5*   PLT  254  308  287       LFTs -   Recent Labs   Lab Test  04/16/18   1546  03/07/18   0833  02/19/18   1558  02/02/18   1736   ALKPHOS  123  129  102  86   BILITOTAL  0.8  0.7  0.6  0.4   ALT  22  21  15  16   AST  17  18  18  20   PROTTOTAL  7.4   --   7.2  6.2*   ALBUMIN  3.5   --   2.7*  2.1*       Iron Panel -   Recent Labs   Lab Test  05/08/18   0954  07/19/17   1306  07/05/17   1204   IRON  58  46  26*   IRONSAT  27  18  12*   ALBERTINA  621*  369  542*         Imaging:  All imaging studies reviewed by me.     Current Medications:    albuterol  2.5 mg Nebulization At Bedtime     allopurinol  100 mg Oral Daily     alteplase  1 mg Intracatheter Once     aspirin  81 mg Oral Daily     atorvastatin  40 mg Oral Daily     cetirizine  10 mg Oral Daily     cyanocolbalamin  100 mcg Oral Daily     fluticasone  1-2 spray Both Nostrils Daily     gelatin absorbable  1 each Topical During Hemodialysis (from stock)     heparin  3 mL Intracatheter During Hemodialysis (from stock)     heparin  3 mL Intracatheter During Hemodialysis (from stock)     hydrALAZINE  25 mg Oral 4 times per day on Sun Mon Wed Fri    And     hydrALAZINE  10  mg Oral 2 times per day on Tue Thu Sat     insulin aspart   Subcutaneous QAM AC     insulin aspart   Subcutaneous Daily with lunch     insulin aspart   Subcutaneous Daily with supper     insulin aspart  1-10 Units Subcutaneous TID AC     insulin aspart  1-7 Units Subcutaneous At Bedtime     insulin glargine  10 Units Subcutaneous QAM AC     isosorbide dinitrate  10 mg Oral 3 times per day on Sun Mon Wed Fri     NEPHROCAPS  1 capsule Oral Daily     omeprazole  20 mg Oral Daily     sodium chloride (PF)  10 mL Intracatheter Q7 Days     sodium chloride (PF)  3 mL Intracatheter During Hemodialysis (from stock)     sodium chloride (PF)  3 mL Intracatheter During Hemodialysis (from stock)     sodium chloride (PF)  3 mL Intracatheter Q8H     vitamin  B-1  100 mg Oral Daily       DOBUTamine 2.5 mcg/kg/min (06/05/18 1000)     DOPamine 2.5 mcg/kg/min (06/05/18 1000)     heparin (porcine) 500 Units/hr (06/05/18 0916)     - MEDICATION INSTRUCTIONS -       Reason ACE/ARB/ARNI order not selected       Reason beta blocker order not selected       Kristi Armas PA-C

## 2018-06-05 NOTE — PROGRESS NOTES
Cardiology Progress Note    Assessment & Plan   Assessment and Plan:  63 year old male with a PMH of CAD, ICM (EF of 15-20%), ESRD, bicuspid aortic valve with AI, severe MR, and proximal AAA who was admitted for decompensated heart failure. He is on dobutamine 2.5 mcg/kg/min and listed for heart/kidney transplant as status 1A.    Major Changes:  - none, continue dobutamine and dopamine at current doses    # Stage D NYHA Class III systolic heart failure secondary to ICM, listed 1A  # Severe mitral regurgitation  # Severe holodiastolic aortic insufficiency   # Non-sustained ventricular tachycardia (minimal)  - dobutamine 2.5 mcg/kg/min, dopamine 2.5 mcg/kg/min  - hydralazine 25 mg q6 hours on non-dialysis days + 10 mg BID on dialysis days (after HD)  - isordil 10 mg TID on non-HD days  - weekly CXR (next 6/4/18) for [place] position  - nephrology challenging dry weight       # Chronic Medical Issues:  - Seborrheic keratoses / Dermatofibromas / Multiple benign nevi / Nummular dermatitis: derm consulted, all lesions benign  - Hx of allergic rhinitis: cetrizine  - ESRD: Dialysis T, Th, Sa   - Non-obstructive CAD, Anomalous RCA: ASA 81 mg, atorvastatin 40 mg daily  - Ascending aortic aneurysm: 4.5 x 4.5 cm proximal ascending aortic aneurysm seen on MRI 2/14  - Multiple benign branch duct-IPMNs: no concerning features affecting transplant candidacy  - gout: allopurinol, prednisone 6/26 - 5/31 (tapered for acute flare)      FEN: regular diet, 2L FR  PROPHY: ambulation  LINES: PICC  DISPO: TBD  CODE STATUS:  Full    Case discussed with Dr. Fabio Coffey  Cardiology Fellow      Interval History   No acute events    afebrile BP MAP 75-80  On 2.5 dobutamine and 2.5 dopamine   Feeling the same    Physical Exam   Temp: 97.7  F (36.5  C) Temp src: Oral BP: 105/70   Heart Rate: 106 Resp: 16 SpO2: 100 % O2 Device: Nasal cannula Oxygen Delivery: 2 LPM  Vitals:    06/03/18 0700 06/04/18 0800 06/05/18 0600   Weight: 76.1  "kg (167 lb 10.6 oz) 78 kg (171 lb 15.3 oz) 77.8 kg (171 lb 8.3 oz)     Vital Signs with Ranges  Temp:  [97.7  F (36.5  C)-98.3  F (36.8  C)] 97.7  F (36.5  C)  Heart Rate:  [103-113] 106  Resp:  [15-18] 16  BP: (103-108)/(64-71) 105/70  SpO2:  [100 %] 100 %  I/O last 3 completed shifts:  In: 1446.4 [P.O.:1060; I.V.:386.4]  Out: -     Heart Rate: 106, Blood pressure 105/70, pulse 107, temperature 97.7  F (36.5  C), temperature source Oral, resp. rate 16, height 1.727 m (5' 8\"), weight 77.8 kg (171 lb 8.3 oz), SpO2 100 %.  171 lbs 8.29 oz  GEN:  Alert, oriented x 3, appears comfortable, NAD.  CV:  Regular rate and rhythm, no murmur no JVD  LUNGS:  Clear to auscultation bilaterally   ABD:  Active bowel sounds, soft, non-tender/non-distended.  No rebound/guarding/rigidity.  EXT:  No edema or cyanosis.      Medications     DOBUTamine 2.5 mcg/kg/min (06/05/18 0700)     DOPamine 2.5 mcg/kg/min (06/05/18 0700)     heparin (porcine)       - MEDICATION INSTRUCTIONS -       Reason ACE/ARB/ARNI order not selected       Reason beta blocker order not selected         sodium chloride 0.9%  250 mL Intravenous Once in dialysis     sodium chloride 0.9%  300 mL Hemodialysis Machine Once     albuterol  2.5 mg Nebulization At Bedtime     allopurinol  100 mg Oral Daily     alteplase  1 mg Intracatheter Once     aspirin  81 mg Oral Daily     atorvastatin  40 mg Oral Daily     cetirizine  10 mg Oral Daily     cyanocolbalamin  100 mcg Oral Daily     epoetin emily (EPOGEN,PROCRIT) inj ESRD  4,000 Units Intravenous Once in dialysis     fluticasone  1-2 spray Both Nostrils Daily     gelatin absorbable  1 each Topical During Hemodialysis (from stock)     heparin (porcine)  500 Units Hemodialysis Machine Once in dialysis     heparin  3 mL Intracatheter During Hemodialysis (from stock)     heparin  3 mL Intracatheter During Hemodialysis (from stock)     hydrALAZINE  25 mg Oral 4 times per day on Sun Mon Wed Fri    And     hydrALAZINE  10 mg Oral 2 " times per day on Tue Thu Sat     insulin aspart   Subcutaneous QAM AC     insulin aspart   Subcutaneous Daily with lunch     insulin aspart   Subcutaneous Daily with supper     insulin aspart  1-10 Units Subcutaneous TID AC     insulin aspart  1-7 Units Subcutaneous At Bedtime     insulin glargine  10 Units Subcutaneous QAM AC     iron sucrose  50 mg Intravenous Once in dialysis     isosorbide dinitrate  10 mg Oral 3 times per day on Sun Mon Wed Fri     NEPHROCAPS  1 capsule Oral Daily     omeprazole  20 mg Oral Daily     polyethylene glycol  17 g Oral Daily     senna-docusate  1 tablet Oral BID     sodium chloride (PF)  10 mL Intracatheter Q7 Days     sodium chloride (PF)  3 mL Intracatheter During Hemodialysis (from stock)     sodium chloride (PF)  3 mL Intracatheter During Hemodialysis (from stock)     sodium chloride (PF)  3 mL Intracatheter Q8H     vitamin  B-1  100 mg Oral Daily       Data     Recent Labs  Lab 06/05/18  0553 06/03/18  0436 06/02/18  0611   WBC 6.1 5.5 6.5   HGB 8.6* 9.4* 8.5*   MCV 93 93 92    308 287    139 140   POTASSIUM 5.3 4.9 4.4   CHLORIDE 105 104 107   CO2 20 26 22   BUN 62* 28 50*   CR 9.17* 5.10* 6.99*   ANIONGAP 12 9 12   TRINI 8.7 8.8 7.9*   GLC 88 124* 93       No results found for this or any previous visit (from the past 24 hour(s)).    Attending Attestation:  Patient seen and examined by me with the team. I have performed all pertinent elements of the physical examination and reviewed the note above. I have reviewed pertinent laboratory, echocardiographic, imaging, and cardiac catheterization results. I agree with the plan of care as described in this note.    Murray Mccarthy MD, PhD

## 2018-06-05 NOTE — PROGRESS NOTES
HEMODIALYSIS TREATMENT NOTE    Date: 6/5/2018  Time: 2:36 PM    Data:  Pre Wt: 79.2 kg (174 lb 9.7 oz)   Desired Wt: 75.5 kg   Post Wt: 76.2 kg (167 lb 15.9 oz)  Weight gain: -3 kg   Weight change: 3 kg  Ultrafiltration - Post Run Net Total Removed (mL): 3500 mL  Ultrafiltration - Post Run Net Total Gain (mL): 0 mL  Vascular Access Status: Yes, secured and visible  Dialyzer Rinse: Light  Total Blood Volume Processed: 65.5  Total Dialysis (Treatment) Time:  3.5 hours    Lab:   No - wanted repeat K post tx, advised to wait 1hr post HD for accurate repeat K    Interventions:  Stable HD tx via RIJ completed without issues.  Total UF 3.5L.  Did not get to goal wt of 75.5kg, nephrology aware, pt tolerated 3.5L UF net and did not want to challenge.  Nephrology confident will get to target wt next HD tx.  Epogen, venofer and low dose heparin administered during HD tx.  See MAR for details.  CVC patent, red port with sluggish aspiration despite heparin dwell between HD uses, flushed vigorously, ran with lines reversed d/t alarms- no alarms with lines reversed.  CVC heparin locked per order.  Dressing appears CDI.    Assessment:  Pt A&O x4, pleasant and cooperative with cares.  Tolerated HD and UF well.  VSS throughout ex 1-2 v-run alarms, rate max 133, peripheral pulse reflected lower rate of approx 100, pt had just repositioned and sat up in bed on own, denied racing heart.  Resolved to rate in 100's after few seconds.  ICU RN came bedside post alarm.     Plan:    Next HD Thursday, 6/7/18 or per renal team.

## 2018-06-05 NOTE — PROGRESS NOTES
Pt remains neurologically intact and independent in room. Sinus tach in low 100s. PA 59/38, CVP 11, SVO2 59, SVR 1129, CO 5, CI 2.6. On RA to 2L for comfort, lungs clear. Dopamine and dobutamine at set rates of 2.5. Remains anuric, to have HD today. Will continue to monitor.

## 2018-06-06 ENCOUNTER — APPOINTMENT (OUTPATIENT)
Dept: OCCUPATIONAL THERAPY | Facility: CLINIC | Age: 63
DRG: 001 | End: 2018-06-06
Attending: INTERNAL MEDICINE
Payer: COMMERCIAL

## 2018-06-06 ENCOUNTER — APPOINTMENT (OUTPATIENT)
Dept: GENERAL RADIOLOGY | Facility: CLINIC | Age: 63
DRG: 001 | End: 2018-06-06
Attending: INTERNAL MEDICINE
Payer: COMMERCIAL

## 2018-06-06 LAB
ANION GAP SERPL CALCULATED.3IONS-SCNC: 11 MMOL/L (ref 3–14)
BASE DEFICIT BLDV-SCNC: 0.7 MMOL/L
BASE EXCESS BLDV CALC-SCNC: 3.9 MMOL/L
BUN SERPL-MCNC: 35 MG/DL (ref 7–30)
CALCIUM SERPL-MCNC: 9.1 MG/DL (ref 8.5–10.1)
CHLORIDE SERPL-SCNC: 102 MMOL/L (ref 94–109)
CO2 SERPL-SCNC: 25 MMOL/L (ref 20–32)
CREAT SERPL-MCNC: 6.44 MG/DL (ref 0.66–1.25)
ERYTHROCYTE [DISTWIDTH] IN BLOOD BY AUTOMATED COUNT: 17.2 % (ref 10–15)
GFR SERPL CREATININE-BSD FRML MDRD: 9 ML/MIN/1.7M2
GLUCOSE BLDC GLUCOMTR-MCNC: 123 MG/DL (ref 70–99)
GLUCOSE BLDC GLUCOMTR-MCNC: 193 MG/DL (ref 70–99)
GLUCOSE BLDC GLUCOMTR-MCNC: 236 MG/DL (ref 70–99)
GLUCOSE BLDC GLUCOMTR-MCNC: 59 MG/DL (ref 70–99)
GLUCOSE BLDC GLUCOMTR-MCNC: 82 MG/DL (ref 70–99)
GLUCOSE SERPL-MCNC: 85 MG/DL (ref 70–99)
HCO3 BLDV-SCNC: 24 MMOL/L (ref 21–28)
HCO3 BLDV-SCNC: 29 MMOL/L (ref 21–28)
HCT VFR BLD AUTO: 30 % (ref 40–53)
HGB BLD-MCNC: 9.2 G/DL (ref 13.3–17.7)
MCH RBC QN AUTO: 28.7 PG (ref 26.5–33)
MCHC RBC AUTO-ENTMCNC: 30.7 G/DL (ref 31.5–36.5)
MCV RBC AUTO: 94 FL (ref 78–100)
O2/TOTAL GAS SETTING VFR VENT: 21 %
O2/TOTAL GAS SETTING VFR VENT: 21 %
OXYHGB MFR BLDV: 44 %
OXYHGB MFR BLDV: 57 %
PCO2 BLDV: 36 MM HG (ref 40–50)
PCO2 BLDV: 43 MM HG (ref 40–50)
PH BLDV: 7.42 PH (ref 7.32–7.43)
PH BLDV: 7.44 PH (ref 7.32–7.43)
PLATELET # BLD AUTO: 247 10E9/L (ref 150–450)
PO2 BLDV: 29 MM HG (ref 25–47)
PO2 BLDV: 34 MM HG (ref 25–47)
POTASSIUM SERPL-SCNC: 4.5 MMOL/L (ref 3.4–5.3)
RBC # BLD AUTO: 3.21 10E12/L (ref 4.4–5.9)
SODIUM SERPL-SCNC: 138 MMOL/L (ref 133–144)
WBC # BLD AUTO: 5.5 10E9/L (ref 4–11)

## 2018-06-06 PROCEDURE — A9270 NON-COVERED ITEM OR SERVICE: HCPCS | Mod: GY | Performed by: INTERNAL MEDICINE

## 2018-06-06 PROCEDURE — 20000004 ZZH R&B ICU UMMC

## 2018-06-06 PROCEDURE — 71045 X-RAY EXAM CHEST 1 VIEW: CPT

## 2018-06-06 PROCEDURE — A9270 NON-COVERED ITEM OR SERVICE: HCPCS | Mod: GY | Performed by: STUDENT IN AN ORGANIZED HEALTH CARE EDUCATION/TRAINING PROGRAM

## 2018-06-06 PROCEDURE — 25000132 ZZH RX MED GY IP 250 OP 250 PS 637: Performed by: STUDENT IN AN ORGANIZED HEALTH CARE EDUCATION/TRAINING PROGRAM

## 2018-06-06 PROCEDURE — 25000125 ZZHC RX 250: Performed by: STUDENT IN AN ORGANIZED HEALTH CARE EDUCATION/TRAINING PROGRAM

## 2018-06-06 PROCEDURE — 40000196 ZZH STATISTIC RAPCV CVP MONITORING

## 2018-06-06 PROCEDURE — 85027 COMPLETE CBC AUTOMATED: CPT | Performed by: INTERNAL MEDICINE

## 2018-06-06 PROCEDURE — 25000128 H RX IP 250 OP 636: Performed by: STUDENT IN AN ORGANIZED HEALTH CARE EDUCATION/TRAINING PROGRAM

## 2018-06-06 PROCEDURE — 40000133 ZZH STATISTIC OT WARD VISIT

## 2018-06-06 PROCEDURE — 94640 AIRWAY INHALATION TREATMENT: CPT

## 2018-06-06 PROCEDURE — 82805 BLOOD GASES W/O2 SATURATION: CPT | Performed by: INTERNAL MEDICINE

## 2018-06-06 PROCEDURE — 97110 THERAPEUTIC EXERCISES: CPT | Mod: GO

## 2018-06-06 PROCEDURE — 97530 THERAPEUTIC ACTIVITIES: CPT | Mod: GO

## 2018-06-06 PROCEDURE — 40000048 ZZH STATISTIC DAILY SWAN MONITORING

## 2018-06-06 PROCEDURE — 25000132 ZZH RX MED GY IP 250 OP 250 PS 637: Performed by: NURSE PRACTITIONER

## 2018-06-06 PROCEDURE — 00000146 ZZHCL STATISTIC GLUCOSE BY METER IP

## 2018-06-06 PROCEDURE — A9270 NON-COVERED ITEM OR SERVICE: HCPCS | Mod: GY | Performed by: NURSE PRACTITIONER

## 2018-06-06 PROCEDURE — 40000275 ZZH STATISTIC RCP TIME EA 10 MIN

## 2018-06-06 PROCEDURE — 25000132 ZZH RX MED GY IP 250 OP 250 PS 637: Mod: GY | Performed by: STUDENT IN AN ORGANIZED HEALTH CARE EDUCATION/TRAINING PROGRAM

## 2018-06-06 PROCEDURE — 25000132 ZZH RX MED GY IP 250 OP 250 PS 637: Mod: GY | Performed by: INTERNAL MEDICINE

## 2018-06-06 PROCEDURE — 80048 BASIC METABOLIC PNL TOTAL CA: CPT | Performed by: INTERNAL MEDICINE

## 2018-06-06 PROCEDURE — 99232 SBSQ HOSP IP/OBS MODERATE 35: CPT | Mod: GC | Performed by: INTERNAL MEDICINE

## 2018-06-06 RX ADMIN — FLUTICASONE PROPIONATE 2 SPRAY: 50 SPRAY, METERED NASAL at 19:45

## 2018-06-06 RX ADMIN — INSULIN ASPART 3 UNITS: 100 INJECTION, SOLUTION INTRAVENOUS; SUBCUTANEOUS at 13:26

## 2018-06-06 RX ADMIN — ALLOPURINOL 100 MG: 100 TABLET ORAL at 08:56

## 2018-06-06 RX ADMIN — ALBUTEROL SULFATE 2.5 MG: 2.5 SOLUTION RESPIRATORY (INHALATION) at 22:59

## 2018-06-06 RX ADMIN — VITAMIN B12 0.1 MG ORAL TABLET 100 MCG: 0.1 TABLET ORAL at 08:56

## 2018-06-06 RX ADMIN — ISOSORBIDE DINITRATE 10 MG: 10 TABLET ORAL at 09:02

## 2018-06-06 RX ADMIN — ATORVASTATIN CALCIUM 40 MG: 40 TABLET, FILM COATED ORAL at 19:44

## 2018-06-06 RX ADMIN — DOPAMINE HYDROCHLORIDE IN DEXTROSE 2.5 MCG/KG/MIN: 1.6 INJECTION, SOLUTION INTRAVENOUS at 11:00

## 2018-06-06 RX ADMIN — Medication 25 MG: at 22:45

## 2018-06-06 RX ADMIN — ISOSORBIDE DINITRATE 10 MG: 10 TABLET ORAL at 13:39

## 2018-06-06 RX ADMIN — Medication 25 MG: at 17:40

## 2018-06-06 RX ADMIN — ISOSORBIDE DINITRATE 10 MG: 10 TABLET ORAL at 19:48

## 2018-06-06 RX ADMIN — INSULIN GLARGINE 10 UNITS: 100 INJECTION, SOLUTION SUBCUTANEOUS at 08:51

## 2018-06-06 RX ADMIN — OMEPRAZOLE 20 MG: 20 CAPSULE, DELAYED RELEASE ORAL at 19:44

## 2018-06-06 RX ADMIN — ASPIRIN 81 MG CHEWABLE TABLET 81 MG: 81 TABLET CHEWABLE at 19:44

## 2018-06-06 RX ADMIN — Medication 1 CAPSULE: at 08:56

## 2018-06-06 RX ADMIN — Medication 100 MG: at 08:56

## 2018-06-06 RX ADMIN — Medication 25 MG: at 09:02

## 2018-06-06 RX ADMIN — INSULIN ASPART 2 UNITS: 100 INJECTION, SOLUTION INTRAVENOUS; SUBCUTANEOUS at 13:25

## 2018-06-06 RX ADMIN — Medication 25 MG: at 11:27

## 2018-06-06 RX ADMIN — CETIRIZINE HYDROCHLORIDE 10 MG: 10 TABLET, FILM COATED ORAL at 08:56

## 2018-06-06 NOTE — PLAN OF CARE
Problem: Patient Care Overview  Goal: Plan of Care/Patient Progress Review  Discharge Planner OT   Patient plan for discharge: Did not discuss 2/2 medical status.   Current status: Challenged pt with aerobic exercise. Pt completed 30 sit to stands without using UEs in prep for sternal precautions. Pt completed continuous step ups x 3 minutes. Issued standing LE exercises to complete on his own. Also issued handouts on precautions in anticipation of surgery.   Barriers to return to prior living situation: Medical status.   Recommendations for discharge: Defer, pending medical course.   Rationale for recommendations: Defer.        Entered by: Juliana Emanuel 06/06/2018 12:46 PM     OT/CR 4E

## 2018-06-06 NOTE — PROGRESS NOTES
Cardiology Progress Note 06/06/2018    Assessment & Plan   Assessment and Plan:  63 year old male with a PMH of CAD, ICM (EF of 15-20%), ESRD, bicuspid aortic valve with AI, severe MR, and proximal AAA who was admitted for decompensated heart failure. He is on dobutamine 2.5 mcg/kg/min and listed for heart/kidney transplant as status 1A.    Major Changes:  - none, continue dobutamine and dopamine at current doses    # Stage D NYHA Class III systolic heart failure secondary to ICM, listed 1A  # Severe mitral regurgitation  # Severe holodiastolic aortic insufficiency   # Non-sustained ventricular tachycardia (minimal)  - dobutamine 2.5 mcg/kg/min, dopamine 2.5 mcg/kg/min  - hydralazine 25 mg q6 hours on non-dialysis days + 10 mg BID on dialysis days (after HD)  - isordil 10 mg TID on non-HD days  - weekly CXR for [place] position(SG placed 5/13)  - nephrology challenging dry weight       # Chronic Medical Issues:  - Seborrheic keratoses / Dermatofibromas / Multiple benign nevi / Nummular dermatitis: derm consulted, all lesions benign  - Hx of allergic rhinitis: cetrizine  - ESRD: Dialysis T, Th, Sa   - Non-obstructive CAD, Anomalous RCA: ASA 81 mg, atorvastatin 40 mg daily  - Ascending aortic aneurysm: 4.5 x 4.5 cm proximal ascending aortic aneurysm seen on MRI 2/14  - Multiple benign branch duct-IPMNs: no concerning features affecting transplant candidacy  - gout: allopurinol, prednisone 6/26 - 5/31 (tapered for acute flare)      FEN: regular diet, 2L FR  PROPHY: ambulation  LINES: PICC  DISPO: TBD  CODE STATUS:  Full    Case discussed with Dr. Fabio Coffey  Cardiology Fellow      Interval History   No acute events   afebrile BP MAP 75-80  On 2.5 dobutamine and 2.5 dopamine   Feeling the same  Felt mild lightheadedness this morning but not lasting long    Physical Exam   Temp: 97.9  F (36.6  C) Temp src: Oral BP: 105/71   Heart Rate: 113 Resp: 16 SpO2: 97 % O2 Device: None (Room air)    Vitals:    06/05/18  "0600 06/05/18 0742 06/05/18 1300   Weight: 77.8 kg (171 lb 8.3 oz) 79.2 kg (174 lb 7.9 oz) 76.2 kg (167 lb 15.9 oz)     Vital Signs with Ranges  Temp:  [97.7  F (36.5  C)-98.7  F (37.1  C)] 97.9  F (36.6  C)  Heart Rate:  [104-120] 113  Resp:  [12-16] 16  BP: (102-123)/(60-82) 105/71  Cuff Mean (mmHg):  [78-94] 80  SpO2:  [96 %-100 %] 97 %  I/O last 3 completed shifts:  In: 1542.4 [P.O.:1180; I.V.:362.4]  Out: 3525 [Urine:25; Other:3500]    Heart Rate: 113, Blood pressure 105/71, pulse 107, temperature 97.9  F (36.6  C), temperature source Oral, resp. rate 16, height 1.727 m (5' 8\"), weight 76.2 kg (167 lb 15.9 oz), SpO2 97 %.  167 lbs 15.85 oz  GEN:  Alert, oriented x 3, appears comfortable, NAD.  CV:  Regular rate and rhythm, no murmur no JVD  LUNGS:  Clear to auscultation bilaterally   ABD:  Active bowel sounds, soft, non-tender/non-distended.  No rebound/guarding/rigidity.  EXT:  No edema or cyanosis.      Medications     DOBUTamine 2.5 mcg/kg/min (06/06/18 0700)     DOPamine 2.5 mcg/kg/min (06/06/18 0700)     - MEDICATION INSTRUCTIONS -       Reason ACE/ARB/ARNI order not selected       Reason beta blocker order not selected         albuterol  2.5 mg Nebulization At Bedtime     allopurinol  100 mg Oral Daily     alteplase  1 mg Intracatheter Once     aspirin  81 mg Oral Daily     atorvastatin  40 mg Oral Daily     cetirizine  10 mg Oral Daily     cyanocolbalamin  100 mcg Oral Daily     fluticasone  1-2 spray Both Nostrils Daily     hydrALAZINE  25 mg Oral 4 times per day on Sun Mon Wed Fri    And     hydrALAZINE  10 mg Oral 2 times per day on Tue Thu Sat     insulin aspart   Subcutaneous QAM AC     insulin aspart   Subcutaneous Daily with lunch     insulin aspart   Subcutaneous Daily with supper     insulin aspart  1-10 Units Subcutaneous TID AC     insulin aspart  1-7 Units Subcutaneous At Bedtime     insulin glargine  10 Units Subcutaneous QAM AC     isosorbide dinitrate  10 mg Oral 3 times per day on Sun Mon " Wed Fri     NEPHROCAPS  1 capsule Oral Daily     omeprazole  20 mg Oral Daily     sodium chloride (PF)  10 mL Intracatheter Q7 Days     sodium chloride (PF)  3 mL Intracatheter Q8H     vitamin  B-1  100 mg Oral Daily       Data     Recent Labs  Lab 06/06/18  0622 06/05/18  1440 06/05/18  0553 06/03/18  0436   WBC 5.5  --  6.1 5.5   HGB 9.2*  --  8.6* 9.4*   MCV 94  --  93 93     --  254 308    136 138 139   POTASSIUM 4.5 3.7 5.3 4.9   CHLORIDE 102 101 105 104   CO2 25 28 20 26   BUN 35* 23 62* 28   CR 6.44* 4.53* 9.17* 5.10*   ANIONGAP 11 8 12 9   TRINI 9.1 9.0 8.7 8.8   GLC 85 100* 88 124*       No results found for this or any previous visit (from the past 24 hour(s)).      I/O last 3 completed shifts:  In: 1542.4 [P.O.:1180; I.V.:362.4]  Out: 3525 [Urine:25; Other:3500]      LINES/TUBES:  Data   PICC Double Lumen 04/22/18 Left Basilic (Active)   Site Assessment WDL 6/6/2018  4:00 AM   External Cath Length (cm) 4 cm 5/28/2018 12:00 AM   Extremity Circumference (cm) 34 cm 5/4/2018  2:00 PM   Dressing Intervention Chlorhexidine patch;Transparent 6/6/2018  4:00 AM   Extravasation? No 6/6/2018  4:00 AM   Dressing Change Due 06/10/18 6/6/2018  4:00 AM   PICC Comment CDI 6/6/2018  4:00 AM   PICC Lumen Assessment Trigger [place];Purple 6/5/2018 12:00 PM   Number of days:45       CVC Single Lumen 05/13/18 Left Internal jugular (Active)   Site Assessment WDL 6/6/2018  4:00 AM   Line Status Saline locked 6/6/2018  4:00 AM   Dressing Intervention Chlorhexidine sponge;Transparent 6/6/2018  4:00 AM   Extravasation? No 6/6/2018  4:00 AM   Dressing Change Due 06/10/18 6/6/2018  4:00 AM   CVC Comment [place] 6/6/2018  4:00 AM   Number of days:24       CVC Double Lumen 04/17/18 Right Internal jugular (Active)   Site Assessment WDL 6/6/2018  4:00 AM   Lumen Soln/Vol REFERENCE 2.1/2.1 6/5/2018  9:19 AM   External Cath Length (cm) 3.5 cm 5/26/2018  2:25 PM   Extravasation? No 6/6/2018  4:00 AM   Dressing Intervention  Chlorhexidine sponge;Transparent 6/6/2018  4:00 AM   Dressing Change Due 06/09/18 6/6/2018  4:00 AM   CVC Comment patent, line reversed for HD d/t sluggish arterial aspiration 6/5/2018  9:19 AM   CVC Lumen Assessment Red;Blue 6/5/2018 12:00 PM   Number of days:50       Pulmonary Artery Catheter - Single Lumen 05/13/18 1700 Left internal jugular vein (Active)   Dressing dressing dry and intact 6/6/2018  4:00 AM   Securement secured with sutures 6/6/2018  4:00 AM   PA Catheter Markings (cm) 63 6/6/2018  4:00 AM   Phlebitis 0-->no symptoms 6/6/2018  4:00 AM   Infiltration 0-->no symptoms 6/6/2018  4:00 AM   Waveform normal 6/6/2018  4:00 AM   Pressure Catheter Interventions blood specimen obtained and sent to lab 6/6/2018  4:00 AM   Daily Review Of Necessity completed 6/6/2018  4:00 AM   Number of days:24     Attending Attestation:  Patient seen and examined by me with the team. I have performed all pertinent elements of the physical examination and reviewed the note above. I have reviewed pertinent laboratory, echocardiographic, imaging, and cardiac catheterization results. I agree with the plan of care as described in this note.    Murray Mccarthy MD, PhD

## 2018-06-06 NOTE — PROGRESS NOTES
Vitals remain stable on DoButamine and Dopamine @ 2.5 mcg/kg/min. Afebrile, denies pain. CO/CI 4.4/2.3, see flowsheet for additional hemodynamics. BM x3 today, anuric. Evening BG 59- pt ate dinner and last glucose 123. Report given to night RN.

## 2018-06-06 NOTE — PLAN OF CARE
Problem: Patient Care Overview  Goal: Plan of Care/Patient Progress Review  Outcome: No Change  Pt on 4E listed status 1A heart transplant  D/I/A  Neuro: alert and oriented  CV: BP stable, sinus tachycardia, afebrile, Dobutamine and Dopamine drips continued, PA 56/32, CVP 12, CI 2, SVO2 44, SVR 1412,   Pulm: room air, LS clear  GI: BM X1 overnight  Plan: continue to monitor hemodynamics

## 2018-06-07 LAB
ANION GAP SERPL CALCULATED.3IONS-SCNC: 12 MMOL/L (ref 3–14)
BASE EXCESS BLDV CALC-SCNC: 1.3 MMOL/L
BASE EXCESS BLDV CALC-SCNC: 8.1 MMOL/L
BUN SERPL-MCNC: 50 MG/DL (ref 7–30)
CALCIUM SERPL-MCNC: 8.9 MG/DL (ref 8.5–10.1)
CHLORIDE SERPL-SCNC: 102 MMOL/L (ref 94–109)
CO2 SERPL-SCNC: 23 MMOL/L (ref 20–32)
CREAT SERPL-MCNC: 8.4 MG/DL (ref 0.66–1.25)
ERYTHROCYTE [DISTWIDTH] IN BLOOD BY AUTOMATED COUNT: 17.2 % (ref 10–15)
GFR SERPL CREATININE-BSD FRML MDRD: 6 ML/MIN/1.7M2
GLUCOSE BLDC GLUCOMTR-MCNC: 108 MG/DL (ref 70–99)
GLUCOSE BLDC GLUCOMTR-MCNC: 145 MG/DL (ref 70–99)
GLUCOSE BLDC GLUCOMTR-MCNC: 150 MG/DL (ref 70–99)
GLUCOSE BLDC GLUCOMTR-MCNC: 180 MG/DL (ref 70–99)
GLUCOSE BLDC GLUCOMTR-MCNC: 190 MG/DL (ref 70–99)
GLUCOSE BLDC GLUCOMTR-MCNC: 92 MG/DL (ref 70–99)
GLUCOSE SERPL-MCNC: 93 MG/DL (ref 70–99)
HCO3 BLDV-SCNC: 26 MMOL/L (ref 21–28)
HCO3 BLDV-SCNC: 32 MMOL/L (ref 21–28)
HCT VFR BLD AUTO: 26.8 % (ref 40–53)
HGB BLD-MCNC: 8.8 G/DL (ref 13.3–17.7)
MAGNESIUM SERPL-MCNC: 1.9 MG/DL (ref 1.6–2.3)
MCH RBC QN AUTO: 29.4 PG (ref 26.5–33)
MCHC RBC AUTO-ENTMCNC: 32.8 G/DL (ref 31.5–36.5)
MCV RBC AUTO: 90 FL (ref 78–100)
O2/TOTAL GAS SETTING VFR VENT: 21 %
O2/TOTAL GAS SETTING VFR VENT: ABNORMAL %
OXYHGB MFR BLDV: 52 %
OXYHGB MFR BLDV: 53 %
PCO2 BLDV: 38 MM HG (ref 40–50)
PCO2 BLDV: 43 MM HG (ref 40–50)
PH BLDV: 7.44 PH (ref 7.32–7.43)
PH BLDV: 7.49 PH (ref 7.32–7.43)
PLATELET # BLD AUTO: 242 10E9/L (ref 150–450)
PO2 BLDV: 30 MM HG (ref 25–47)
PO2 BLDV: 30 MM HG (ref 25–47)
POTASSIUM SERPL-SCNC: 4.7 MMOL/L (ref 3.4–5.3)
RBC # BLD AUTO: 2.99 10E12/L (ref 4.4–5.9)
SODIUM SERPL-SCNC: 137 MMOL/L (ref 133–144)
WBC # BLD AUTO: 6.3 10E9/L (ref 4–11)

## 2018-06-07 PROCEDURE — A9270 NON-COVERED ITEM OR SERVICE: HCPCS | Mod: GY | Performed by: NURSE PRACTITIONER

## 2018-06-07 PROCEDURE — 85027 COMPLETE CBC AUTOMATED: CPT | Performed by: NURSE PRACTITIONER

## 2018-06-07 PROCEDURE — A9270 NON-COVERED ITEM OR SERVICE: HCPCS | Mod: GY | Performed by: INTERNAL MEDICINE

## 2018-06-07 PROCEDURE — 25000125 ZZHC RX 250: Performed by: STUDENT IN AN ORGANIZED HEALTH CARE EDUCATION/TRAINING PROGRAM

## 2018-06-07 PROCEDURE — 40000048 ZZH STATISTIC DAILY SWAN MONITORING

## 2018-06-07 PROCEDURE — 25000132 ZZH RX MED GY IP 250 OP 250 PS 637: Mod: GY | Performed by: NURSE PRACTITIONER

## 2018-06-07 PROCEDURE — 25000132 ZZH RX MED GY IP 250 OP 250 PS 637: Performed by: INTERNAL MEDICINE

## 2018-06-07 PROCEDURE — 99232 SBSQ HOSP IP/OBS MODERATE 35: CPT | Mod: GC | Performed by: INTERNAL MEDICINE

## 2018-06-07 PROCEDURE — 25000128 H RX IP 250 OP 636: Performed by: INTERNAL MEDICINE

## 2018-06-07 PROCEDURE — 25000132 ZZH RX MED GY IP 250 OP 250 PS 637: Performed by: NURSE PRACTITIONER

## 2018-06-07 PROCEDURE — 80048 BASIC METABOLIC PNL TOTAL CA: CPT | Performed by: NURSE PRACTITIONER

## 2018-06-07 PROCEDURE — 40000275 ZZH STATISTIC RCP TIME EA 10 MIN

## 2018-06-07 PROCEDURE — 82805 BLOOD GASES W/O2 SATURATION: CPT | Performed by: INTERNAL MEDICINE

## 2018-06-07 PROCEDURE — 00000146 ZZHCL STATISTIC GLUCOSE BY METER IP

## 2018-06-07 PROCEDURE — 94640 AIRWAY INHALATION TREATMENT: CPT | Mod: 76

## 2018-06-07 PROCEDURE — A9270 NON-COVERED ITEM OR SERVICE: HCPCS | Mod: GY | Performed by: STUDENT IN AN ORGANIZED HEALTH CARE EDUCATION/TRAINING PROGRAM

## 2018-06-07 PROCEDURE — 90937 HEMODIALYSIS REPEATED EVAL: CPT

## 2018-06-07 PROCEDURE — 40000196 ZZH STATISTIC RAPCV CVP MONITORING

## 2018-06-07 PROCEDURE — 83735 ASSAY OF MAGNESIUM: CPT | Performed by: NURSE PRACTITIONER

## 2018-06-07 PROCEDURE — 63400005 ZZH RX 634: Performed by: INTERNAL MEDICINE

## 2018-06-07 PROCEDURE — 25000132 ZZH RX MED GY IP 250 OP 250 PS 637: Mod: GY | Performed by: STUDENT IN AN ORGANIZED HEALTH CARE EDUCATION/TRAINING PROGRAM

## 2018-06-07 PROCEDURE — 20000004 ZZH R&B ICU UMMC

## 2018-06-07 RX ORDER — HEPARIN SODIUM 1000 [USP'U]/ML
500 INJECTION, SOLUTION INTRAVENOUS; SUBCUTANEOUS CONTINUOUS
Status: DISCONTINUED | OUTPATIENT
Start: 2018-06-07 | End: 2018-06-07

## 2018-06-07 RX ORDER — HEPARIN SODIUM 1000 [USP'U]/ML
500 INJECTION, SOLUTION INTRAVENOUS; SUBCUTANEOUS
Status: COMPLETED | OUTPATIENT
Start: 2018-06-07 | End: 2018-06-07

## 2018-06-07 RX ADMIN — FLUTICASONE PROPIONATE 2 SPRAY: 50 SPRAY, METERED NASAL at 20:23

## 2018-06-07 RX ADMIN — SODIUM CHLORIDE 250 ML: 9 INJECTION, SOLUTION INTRAVENOUS at 11:14

## 2018-06-07 RX ADMIN — SODIUM CHLORIDE 300 ML: 9 INJECTION, SOLUTION INTRAVENOUS at 11:14

## 2018-06-07 RX ADMIN — HYDRALAZINE HYDROCHLORIDE 10 MG: 10 TABLET, FILM COATED ORAL at 20:21

## 2018-06-07 RX ADMIN — Medication 100 MG: at 08:11

## 2018-06-07 RX ADMIN — Medication 1 CAPSULE: at 08:11

## 2018-06-07 RX ADMIN — ALBUTEROL SULFATE 2.5 MG: 2.5 SOLUTION RESPIRATORY (INHALATION) at 22:18

## 2018-06-07 RX ADMIN — HEPARIN SODIUM 500 UNITS: 1000 INJECTION, SOLUTION INTRAVENOUS; SUBCUTANEOUS at 11:13

## 2018-06-07 RX ADMIN — ALLOPURINOL 100 MG: 100 TABLET ORAL at 08:09

## 2018-06-07 RX ADMIN — CETIRIZINE HYDROCHLORIDE 10 MG: 10 TABLET, FILM COATED ORAL at 08:10

## 2018-06-07 RX ADMIN — ASPIRIN 81 MG CHEWABLE TABLET 81 MG: 81 TABLET CHEWABLE at 20:21

## 2018-06-07 RX ADMIN — INSULIN GLARGINE 10 UNITS: 100 INJECTION, SOLUTION SUBCUTANEOUS at 08:14

## 2018-06-07 RX ADMIN — INSULIN ASPART 2 UNITS: 100 INJECTION, SOLUTION INTRAVENOUS; SUBCUTANEOUS at 20:22

## 2018-06-07 RX ADMIN — HEPARIN SODIUM 500 UNITS/HR: 1000 INJECTION, SOLUTION INTRAVENOUS; SUBCUTANEOUS at 11:14

## 2018-06-07 RX ADMIN — INSULIN ASPART 1 UNITS: 100 INJECTION, SOLUTION INTRAVENOUS; SUBCUTANEOUS at 13:44

## 2018-06-07 RX ADMIN — OMEPRAZOLE 20 MG: 20 CAPSULE, DELAYED RELEASE ORAL at 20:21

## 2018-06-07 RX ADMIN — VITAMIN B12 0.1 MG ORAL TABLET 100 MCG: 0.1 TABLET ORAL at 08:10

## 2018-06-07 RX ADMIN — ATORVASTATIN CALCIUM 40 MG: 40 TABLET, FILM COATED ORAL at 20:21

## 2018-06-07 RX ADMIN — EPOETIN ALFA 4000 UNITS: 10000 SOLUTION INTRAVENOUS; SUBCUTANEOUS at 11:15

## 2018-06-07 RX ADMIN — INSULIN ASPART 8 UNITS: 100 INJECTION, SOLUTION INTRAVENOUS; SUBCUTANEOUS at 13:43

## 2018-06-07 NOTE — PROGRESS NOTES
"  Nephrology Progress Note  06/07/2018         Assessment & Recommendations:   Murray Nicholson is a 63 year old year old male with PMH of HTN, DMII, functionally bicuspid aortic valve, CHF/CM (EF 10-15%), ESRD, admitted with worsening dyspnea/SOB, now on dobutamine and dopamine pending heart/kidney transplant.        ESRD: due to DM, on PD since Aug 2017, changed to HD 1/18, now TTS, at [facility] under care Dr Mcpherson, [facility] tunnel, EDW 75.5kg, 3.4 Hr.   -- HD scheduled TTS.  -- low dose heparin HD.   -- heparin lock CVC,     BP and volume: Mild hypervolemia with edema. No hypoxia or need for supplemental oxygen. Pre run weight 77.8 kg. EDW  75.5kg. SBP teens/   - Tolerated 2.3 kg UF today, but dropped B/P at end of run into the 80's/.    - Continue pre run weights     Electrolyte and acid base: no issue       Anemia: cont venofer 100 mg Qtues, cont Epo 4000 u with dialysis       BMD: stable no issue , hectorol and phoslo stopped. Phos 4.2 on 6/3      Severe AV and MR/ Cardiomyopathy EF 15%: listed for heart kidney tx. On Dobutamine/Dopamine infusions    Recommendations were communicated to primary team via progress note    Patricia Cook, NP   [phone]    Interval History :   No acute renal concerns,.   Interval progress notes, imaging studies and labs reviewed    Review of Systems:   I reviewed the following systems:  GI: Has good appetite  Neuro:  no confusion  Constitutional:  no fever or chills  CV: denies dyspnea or CP. Has mild edema    Physical Exam:   I/O last 3 completed shifts:  In: 1406.4 [P.O.:1020; I.V.:386.4]  Out: -    /70  Pulse 107  Temp 98.1  F (36.7  C) (Oral)  Resp 16  Ht 1.727 m (5' 8\")  Wt 77.8 kg (171 lb 8.3 oz)  SpO2 98%  BMI 26.08 kg/m2     GENERAL APPEARANCE: Calm, interactive  EYES:  no scleral icterus, pupils equal  PULM: lungs CTA. Breathing is non labored. Not on supplemental oxygen  CV: Tachy     -edema - 1+ LE edema  GI: soft, NT, non distended  INTEGUMENT: no " cyanosis or rash  NEURO:  Alert/oriented  Access - Right TDC    Labs:   All labs reviewed by me  Electrolytes/Renal -   Recent Labs   Lab Test  06/07/18   0610  06/06/18   0622  06/05/18   1440  06/05/18   0553  06/03/18   0436   05/29/18   0434   05/26/18   1632   NA  137  138  136  138  139   < >  139   < >   --    POTASSIUM  4.7  4.5  3.7  5.3  4.9   < >  4.8   < >  4.0   CHLORIDE  102  102  101  105  104   < >  101   < >   --    CO2  23  25  28  20  26   < >  25   < >   --    BUN  50*  35*  23  62*  28   < >  66*   < >   --    CR  8.40*  6.44*  4.53*  9.17*  5.10*   < >  9.50*   < >   --    GLC  93  85  100*  88  124*   < >  101*   < >   --    TRINI  8.9  9.1  9.0  8.7  8.8   < >  9.1   < >   --    MAG  1.9   --    --   2.3  2.0   < >  2.2   < >  1.7   PHOS   --    --    --    --   4.2   --   3.9   --   3.5    < > = values in this interval not displayed.       CBC -   Recent Labs   Lab Test  06/07/18   0610  06/06/18   0622  06/05/18   0553   WBC  6.3  5.5  6.1   HGB  8.8*  9.2*  8.6*   PLT  242  247  254       LFTs -   Recent Labs   Lab Test  04/16/18   1546  03/07/18   0833  02/19/18   1558  02/02/18   1736   ALKPHOS  123  129  102  86   BILITOTAL  0.8  0.7  0.6  0.4   ALT  22  21  15  16   AST  17  18  18  20   PROTTOTAL  7.4   --   7.2  6.2*   ALBUMIN  3.5   --   2.7*  2.1*       Iron Panel -   Recent Labs   Lab Test  05/08/18   0954  07/19/17   1306  07/05/17   1204   IRON  58  46  26*   IRONSAT  27  18  12*   ALBERTINA  621*  369  542*         Imaging:  Exam: XR CHEST PORT 1 VW, 6/6/2018 11:02 AM     Indication: SG cath position check weekly;      Comparison: Radiograph the chest 5/28/2018     Findings:   AP view of the chest. Right IJ central venous catheter with the tip in  the high right atrium. Left IJ Lyons-Jax catheter with the tip over  the main pulmonary artery. Left PICC tip projects over the mid SVC.  Cardiomediastinal silhouette is stable enlarged. Pulmonary vasculature  is distinct but with mild  perihilar opacities. Mild bibasilar  opacities. No new focal airspace opacity. No pneumothorax or pleural  effusion. Upper abdomen is unremarkable.         Impression:   1. Left IJ Elwin-Jax catheter projects with the tip over the main  pulmonary artery. Remaining support devices are stable.  2. Mild bibasilar atelectasis and pulmonary vascular congestion.     I have personally reviewed the examination and initial interpretation  and I agree with the findings.     KAYLIN AIKEN MD ([place])    Current Medications:    albuterol  2.5 mg Nebulization At Bedtime     allopurinol  100 mg Oral Daily     aspirin  81 mg Oral Daily     atorvastatin  40 mg Oral Daily     cetirizine  10 mg Oral Daily     cyanocolbalamin  100 mcg Oral Daily     fluticasone  1-2 spray Both Nostrils Daily     gelatin absorbable  1 each Topical During Hemodialysis (from stock)     heparin  3 mL Intracatheter During Hemodialysis (from stock)     heparin  3 mL Intracatheter During Hemodialysis (from stock)     hydrALAZINE  25 mg Oral 4 times per day on Sun Mon Wed Fri    And     hydrALAZINE  10 mg Oral 2 times per day on Tue Thu Sat     insulin aspart   Subcutaneous QAM AC     insulin aspart   Subcutaneous Daily with lunch     insulin aspart   Subcutaneous Daily with supper     insulin aspart  1-10 Units Subcutaneous TID AC     insulin aspart  1-7 Units Subcutaneous At Bedtime     insulin glargine  10 Units Subcutaneous QAM AC     isosorbide dinitrate  10 mg Oral 3 times per day on Sun Mon Wed Fri     NEPHROCAPS  1 capsule Oral Daily     omeprazole  20 mg Oral Daily     sodium chloride (PF)  10 mL Intracatheter Q7 Days     sodium chloride (PF)  3 mL Intracatheter During Hemodialysis (from stock)     sodium chloride (PF)  3 mL Intracatheter During Hemodialysis (from stock)     sodium chloride (PF)  3 mL Intracatheter Q8H     vitamin  B-1  100 mg Oral Daily       DOBUTamine 2.5 mcg/kg/min (06/07/18 1200)     DOPamine 2.5 mcg/kg/min (06/07/18 1200)      heparin (porcine) 500 Units/hr (06/07/18 1114)     - MEDICATION INSTRUCTIONS -       Reason ACE/ARB/ARNI order not selected       Reason beta blocker order not selected       Patricia Cook, NP

## 2018-06-07 NOTE — PROGRESS NOTES
Cardiology Progress Note 06/07/2018    Assessment & Plan   Assessment and Plan:  63 year old male with a PMH of CAD, ICM (EF of 15-20%), ESRD, bicuspid aortic valve with AI, severe MR, and proximal AAA who was admitted for decompensated heart failure. He is on dobutamine 2.5 mcg/kg/min and listed for heart/kidney transplant as status 1A.    Major Changes:  - none, continue dobutamine and dopamine at current doses    # Stage D NYHA Class III systolic heart failure secondary to ICM, listed 1A  # Severe mitral regurgitation  # Severe aortic insufficiency due to bicuspid Ao valve  # Non-sustained ventricular tachycardia (minimal)  - dobutamine 2.5 mcg/kg/min, dopamine 2.5 mcg/kg/min  - hydralazine 25 mg q6 hours on non-dialysis days + 10 mg BID on dialysis days (after HD)  - isordil 10 mg TID on non-HD days  - weekly CXR for [place] position(SG placed 5/13)  - nephrology challenging dry weight       # Chronic Medical Issues:  - Seborrheic keratoses / Dermatofibromas / Multiple benign nevi / Nummular dermatitis: derm consulted, all lesions benign  - Hx of allergic rhinitis: cetrizine  - ESRD: Dialysis T, Th, Sa   - Non-obstructive CAD, Anomalous RCA: ASA 81 mg, atorvastatin 40 mg daily  - Ascending aortic aneurysm: 4.5 x 4.5 cm proximal ascending aortic aneurysm seen on MRI 2/14  - Multiple benign branch duct-IPMNs: no concerning features affecting transplant candidacy  - gout: allopurinol, prednisone 6/26 - 5/31 (tapered for acute flare)      FEN: regular diet, 2L FR  PROPHY: ambulation  LINES: PICC  DISPO: TBD  CODE STATUS:  Full    Case discussed with Dr. Fabio Coffey  Cardiology Fellow      Interval History   No acute events   afebrile BP MAP 75-80  On 2.5 dobutamine and 2.5 dopamine   Feeling the same    Physical Exam   Temp: 98  F (36.7  C) Temp src: Oral BP: 102/77   Heart Rate: 111 Resp: 16 SpO2: 100 % O2 Device: None (Room air)    Vitals:    06/05/18 1300 06/07/18 0600 06/07/18 0700   Weight: 76.2 kg  "(167 lb 15.9 oz) 77.2 kg (170 lb 3.1 oz) 77.8 kg (171 lb 8.3 oz)     Vital Signs with Ranges  Temp:  [98  F (36.7  C)-98.6  F (37  C)] 98  F (36.7  C)  Heart Rate:  [102-115] 111  Resp:  [12-16] 16  BP: ()/(61-77) 102/77  SpO2:  [100 %] 100 %  I/O last 3 completed shifts:  In: 1406.4 [P.O.:1020; I.V.:386.4]  Out: -     Heart Rate: 111, Blood pressure 102/77, pulse 107, temperature 98  F (36.7  C), temperature source Oral, resp. rate 16, height 1.727 m (5' 8\"), weight 77.8 kg (171 lb 8.3 oz), SpO2 100 %.  171 lbs 8.29 oz  GEN:  Alert, oriented x 3, appears comfortable, NAD.  CV:  Regular rate and rhythm, no murmur no JVD  LUNGS:  Clear to auscultation bilaterally   ABD:  Active bowel sounds, soft, non-tender/non-distended.  No rebound/guarding/rigidity.  EXT:  No edema or cyanosis.      Medications     DOBUTamine 2.5 mcg/kg/min (06/07/18 1000)     DOPamine 2.5 mcg/kg/min (06/07/18 1000)     heparin (porcine) 500 Units/hr (06/07/18 1114)     - MEDICATION INSTRUCTIONS -       Reason ACE/ARB/ARNI order not selected       Reason beta blocker order not selected         albuterol  2.5 mg Nebulization At Bedtime     allopurinol  100 mg Oral Daily     aspirin  81 mg Oral Daily     atorvastatin  40 mg Oral Daily     cetirizine  10 mg Oral Daily     cyanocolbalamin  100 mcg Oral Daily     fluticasone  1-2 spray Both Nostrils Daily     gelatin absorbable  1 each Topical During Hemodialysis (from stock)     heparin  3 mL Intracatheter During Hemodialysis (from stock)     heparin  3 mL Intracatheter During Hemodialysis (from stock)     hydrALAZINE  25 mg Oral 4 times per day on Sun Mon Wed Fri    And     hydrALAZINE  10 mg Oral 2 times per day on Tue Thu Sat     insulin aspart   Subcutaneous QAM AC     insulin aspart   Subcutaneous Daily with lunch     insulin aspart   Subcutaneous Daily with supper     insulin aspart  1-10 Units Subcutaneous TID AC     insulin aspart  1-7 Units Subcutaneous At Bedtime     insulin glargine  " 10 Units Subcutaneous QAM AC     isosorbide dinitrate  10 mg Oral 3 times per day on Sun Mon Wed Fri     NEPHROCAPS  1 capsule Oral Daily     omeprazole  20 mg Oral Daily     sodium chloride (PF)  10 mL Intracatheter Q7 Days     sodium chloride (PF)  3 mL Intracatheter During Hemodialysis (from stock)     sodium chloride (PF)  3 mL Intracatheter During Hemodialysis (from stock)     sodium chloride (PF)  3 mL Intracatheter Q8H     vitamin  B-1  100 mg Oral Daily       Data     Recent Labs  Lab 06/07/18  0610 06/06/18  0622 06/05/18  1440 06/05/18  0553   WBC 6.3 5.5  --  6.1   HGB 8.8* 9.2*  --  8.6*   MCV 90 94  --  93    247  --  254    138 136 138   POTASSIUM 4.7 4.5 3.7 5.3   CHLORIDE 102 102 101 105   CO2 23 25 28 20   BUN 50* 35* 23 62*   CR 8.40* 6.44* 4.53* 9.17*   ANIONGAP 12 11 8 12   TRINI 8.9 9.1 9.0 8.7   GLC 93 85 100* 88       No results found for this or any previous visit (from the past 24 hour(s)).      I/O last 3 completed shifts:  In: 1406.4 [P.O.:1020; I.V.:386.4]  Out: -       LINES/TUBES:  Data   PICC Double Lumen 04/22/18 Left Basilic (Active)   Site Assessment WDL 6/7/2018  8:00 AM   External Cath Length (cm) 4 cm 5/28/2018 12:00 AM   Extremity Circumference (cm) 34 cm 5/4/2018  2:00 PM   Dressing Intervention Chlorhexidine patch;Transparent 6/7/2018  8:00 AM   Extravasation? No 6/7/2018  8:00 AM   Dressing Change Due 06/10/18 6/7/2018  8:00 AM   PICC Comment CDI 6/6/2018  4:00 AM   PICC Lumen Assessment Trigger [place];Purple 6/5/2018 12:00 PM   Number of days:46       CVC Single Lumen 05/13/18 Left Internal jugular (Active)   Site Assessment WDL 6/7/2018  8:00 AM   Line Status Saline locked 6/7/2018  8:00 AM   Dressing Intervention Chlorhexidine sponge;Transparent 6/7/2018  8:00 AM   Extravasation? No 6/7/2018  8:00 AM   Dressing Change Due 06/07/18 6/7/2018  8:00 AM   CVC Comment rogerio 6/7/2018  8:00 AM   Number of days:25       CVC Double Lumen 04/17/18 Right Internal jugular  (Active)   Site Assessment WDL 6/7/2018  8:00 AM   Lumen Soln/Vol REFERENCE 2.1/2.1 6/5/2018  9:19 AM   External Cath Length (cm) 3.5 cm 5/26/2018  2:25 PM   Extravasation? No 6/7/2018  8:00 AM   Dressing Intervention Chlorhexidine sponge;Transparent 6/7/2018  8:00 AM   Dressing Change Due 06/09/18 6/7/2018  8:00 AM   CVC Comment HD 6/7/2018  8:00 AM   CVC Lumen Assessment Red;Blue 6/5/2018 12:00 PM   Number of days:51       Pulmonary Artery Catheter - Single Lumen 05/13/18 1700 Left internal jugular vein (Active)   Dressing dressing dry and intact 6/7/2018  8:00 AM   Securement secured with sutures 6/7/2018  8:00 AM   PA Catheter Markings (cm) 63 6/7/2018  8:00 AM   Phlebitis 0-->no symptoms 6/7/2018  8:00 AM   Infiltration 0-->no symptoms 6/7/2018  8:00 AM   Waveform normal 6/7/2018  8:00 AM   Pressure Catheter Interventions line leveled/zeroed 6/7/2018  8:00 AM   Daily Review Of Necessity completed 6/7/2018  8:00 AM   Number of days:25       Attending Attestation:  Patient seen and examined by me with the team. I have performed all pertinent elements of the physical examination and reviewed the note above. I have reviewed pertinent laboratory, echocardiographic, imaging, and cardiac catheterization results. I agree with the plan of care as described in this note.    Murray Mccarthy MD, PhD

## 2018-06-07 NOTE — PLAN OF CARE
Problem: Patient Care Overview  Goal: Plan of Care/Patient Progress Review  Outcome: No Change  Neuro: A&O, up ab edith  CV: -110's, BP stable. PA 52/32, CVP 8, CO 4.0, CI 2.1  Pulm: RA, LS clear  GI: Reg diet, 2L FR  : Anuric, on HD. Plan for a run today.   IV: [place], HD, PIV's  Gtts: Dopamine and Dobutamine gtts at 2.5 mcg/kg  Skin: Intact  Pain: No complaints    Continue to monitor VS and [place].

## 2018-06-07 NOTE — PROGRESS NOTES
"HEMODIALYSIS TREATMENT NOTE    Date: 6/7/2018  Time: 3:40 PM    Data:  Pre Wt: 77.8 kg (171 lb 8.3 oz)   Desired Wt: 75.3 kg   Post Wt: 75.5 kg (166 lb 7.2 oz)  Weight gain: -2.3 kg   Weight change: 2.3 kg  Ultrafiltration - Post Run Net Total Removed (mL): 2300 mL  Ultrafiltration - Post Run Net Total Gain (mL): 0 mL  Vascular Access Status: Yes, secured and visible  Dialyzer Rinse: Streaked  Total Blood Volume Processed: 78  Total Dialysis (Treatment) Time:  3.5 hours    Lab:   No    Interventions:Assessment:  3.5 HD run completed. CVC utilized with lines reversed due to high negative arterial pressure. . Goal of 2.5 set, d/t low BP near end of run after pt ate lunch BP dropped to low 80's over 50's. 100 mL NS given and goal adjusted to 2.3 adjusting also for NS given. BP slowly manny after rinse back. ICU aware of BP in high 80's over 50's and will continue to monitor. Pt asymptomatic and claimed to feel \" fine\". 2.3L of fluid obtained. CVC lumens heparin locked. Hand off report given to ICU nurse.     Plan:    Per nephrology team.    "

## 2018-06-07 NOTE — PLAN OF CARE
Problem: Patient Care Overview  Goal: Plan of Care/Patient Progress Review  Outcome: No Change  Patient status 1A for heart transplant.  On T-Th-Sa dialysis - had dialysis run today (2.3L off).  BECKA #s at 06 and 1800.  Vitals measured throughout dialyslis per protocol and can be monitored q4-6 hours per Cards II.  Dobut and dopamine continue at straight rates.  Patient has denied pain.

## 2018-06-07 NOTE — PROGRESS NOTES
CLINICAL NUTRITION SERVICES - REASSESSMENT NOTE     Nutrition Prescription    RECOMMENDATIONS FOR MDs/PROVIDERS TO ORDER:  Continue liberalized diet as ordered    Malnutrition Status:    Non-severe malnutrition in the context of chronic illness    Recommendations already ordered by Registered Dietitian (RD):  Changed to Boost Plus or Nepro with meals    Future/Additional Recommendations:  If needs nutrition support (i.e. post-op if receives transplant), recommend either Nepro (if needs lower K+/phos formula) with eventual goal of 60 ml/hr (1440 ml/day) to provide 2592 kcals (35 kcal/kg/day), 117 g PRO (1.6 g/kg/day), 1051 ml free H2O, 232 g CHO and 18 g Fiber daily vs TwoCal HN (if wish standard K+/phos content) @ 55 mL/hr (1320 mL/day) to provide 2640 kcals (35 kcal/kg/day), 111 g PRO (1.5 g/kg/day), 924 mL H2O, 289 g CHO and 7 g Fiber daily.     EVALUATION OF THE PROGRESS TOWARD GOALS   Diet: Regular, 2000 mL fluid restriction, Nepro every-other day @ 2 pm  Intake: Meal intake most often documented as 100%, one time was 50% over the past week. Appetite good, eating well per RN notes and per pt report.Pt states he is focusing on protein foods and eating some at each meal (eggs, meat, beans, etc). Pt not often snacking between meals as he does not get hungry between meals. Not drinking Nepro as much this week, finds they are piling up. Wants to try chocolate Boost.       NEW FINDINGS   -Renal: remains on HD, K+ and phos controlled    MALNUTRITION  % Intake: No decreased intake noted  % Weight Loss: None noted, current wt 78.8 kg remains >lowest admit wt 74.7 kg on 4/21  Subcutaneous Fat Loss: Facial region and Lower arm: mild   Muscle Loss: Temporal, Thoracic region (clavicle, acromium bone, deltoid, trapezius, pectoral), Upper arm (bicep, tricep) and Lower arm  (forearm): moderate   Fluid Accumulation/Edema: Does not meet criteria  Malnutrition Diagnosis: Non-severe malnutrition in the context of chronic  illness    Previous Goals   Pt to consume at least 1 protein source at TID meals + ~1 protein supplement or additional snack daily.  Evaluation: eating protein @ TID meals, no snacks    Previous Nutrition Diagnosis  Predicted inadequate nutrient intake (protein) related to increased needs on HD AEB historically had difficulties w/ menu options and eating enough protein foods  Evaluation: Ongoing    CURRENT NUTRITION DIAGNOSIS  Predicted inadequate nutrient intake (protein) related to increased needs on HD AEB historically had difficulties w/ menu options and eating enough protein foods    INTERVENTIONS  Implementation  Medical food supplement therapy - adjusted supplements per pt preference, pt would rather have a supplement as a beverage with a meal rather than between. Noted that would have to monitor K+/phos levels if use Boost vs Nepro     Goals  Pt to consume at least 1 protein source at TID meals + ~1 protein supplement or additional snack daily.    Monitoring/Evaluation  Progress toward goals will be monitored and evaluated per protocol.     Zunilda Deleon, RD, LD, [facility]  CVICU Dietitian  Pager: [phone]

## 2018-06-08 ENCOUNTER — DOCUMENTATION ONLY (OUTPATIENT)
Dept: TRANSPLANT | Facility: CLINIC | Age: 63
End: 2018-06-08

## 2018-06-08 LAB
BASE EXCESS BLDV CALC-SCNC: 0.7 MMOL/L
BASE EXCESS BLDV CALC-SCNC: 5.7 MMOL/L
GLUCOSE BLDC GLUCOMTR-MCNC: 102 MG/DL (ref 70–99)
GLUCOSE BLDC GLUCOMTR-MCNC: 102 MG/DL (ref 70–99)
GLUCOSE BLDC GLUCOMTR-MCNC: 158 MG/DL (ref 70–99)
GLUCOSE BLDC GLUCOMTR-MCNC: 176 MG/DL (ref 70–99)
HCO3 BLDV-SCNC: 24 MMOL/L (ref 21–28)
HCO3 BLDV-SCNC: 30 MMOL/L (ref 21–28)
O2/TOTAL GAS SETTING VFR VENT: 21 %
O2/TOTAL GAS SETTING VFR VENT: 21 %
OXYHGB MFR BLDV: 53 %
OXYHGB MFR BLDV: 55 %
PCO2 BLDV: 33 MM HG (ref 40–50)
PCO2 BLDV: 43 MM HG (ref 40–50)
PH BLDV: 7.45 PH (ref 7.32–7.43)
PH BLDV: 7.47 PH (ref 7.32–7.43)
PO2 BLDV: 29 MM HG (ref 25–47)
PO2 BLDV: 33 MM HG (ref 25–47)

## 2018-06-08 PROCEDURE — 25000132 ZZH RX MED GY IP 250 OP 250 PS 637: Mod: GY | Performed by: INTERNAL MEDICINE

## 2018-06-08 PROCEDURE — 40000196 ZZH STATISTIC RAPCV CVP MONITORING

## 2018-06-08 PROCEDURE — 82805 BLOOD GASES W/O2 SATURATION: CPT | Performed by: INTERNAL MEDICINE

## 2018-06-08 PROCEDURE — A9270 NON-COVERED ITEM OR SERVICE: HCPCS | Mod: GY | Performed by: STUDENT IN AN ORGANIZED HEALTH CARE EDUCATION/TRAINING PROGRAM

## 2018-06-08 PROCEDURE — 40000048 ZZH STATISTIC DAILY SWAN MONITORING

## 2018-06-08 PROCEDURE — 25000132 ZZH RX MED GY IP 250 OP 250 PS 637: Performed by: NURSE PRACTITIONER

## 2018-06-08 PROCEDURE — 25000132 ZZH RX MED GY IP 250 OP 250 PS 637: Mod: GY | Performed by: STUDENT IN AN ORGANIZED HEALTH CARE EDUCATION/TRAINING PROGRAM

## 2018-06-08 PROCEDURE — A9270 NON-COVERED ITEM OR SERVICE: HCPCS | Mod: GY | Performed by: INTERNAL MEDICINE

## 2018-06-08 PROCEDURE — 25000128 H RX IP 250 OP 636: Performed by: INTERNAL MEDICINE

## 2018-06-08 PROCEDURE — 00000146 ZZHCL STATISTIC GLUCOSE BY METER IP

## 2018-06-08 PROCEDURE — 94640 AIRWAY INHALATION TREATMENT: CPT | Mod: 76

## 2018-06-08 PROCEDURE — 20000004 ZZH R&B ICU UMMC

## 2018-06-08 PROCEDURE — 99232 SBSQ HOSP IP/OBS MODERATE 35: CPT | Mod: GC | Performed by: INTERNAL MEDICINE

## 2018-06-08 PROCEDURE — A9270 NON-COVERED ITEM OR SERVICE: HCPCS | Mod: GY | Performed by: NURSE PRACTITIONER

## 2018-06-08 PROCEDURE — 25000128 H RX IP 250 OP 636: Performed by: STUDENT IN AN ORGANIZED HEALTH CARE EDUCATION/TRAINING PROGRAM

## 2018-06-08 PROCEDURE — 25000132 ZZH RX MED GY IP 250 OP 250 PS 637: Mod: GY | Performed by: NURSE PRACTITIONER

## 2018-06-08 PROCEDURE — 40000275 ZZH STATISTIC RCP TIME EA 10 MIN

## 2018-06-08 PROCEDURE — 25000125 ZZHC RX 250: Performed by: STUDENT IN AN ORGANIZED HEALTH CARE EDUCATION/TRAINING PROGRAM

## 2018-06-08 RX ADMIN — ISOSORBIDE DINITRATE 10 MG: 10 TABLET ORAL at 14:05

## 2018-06-08 RX ADMIN — INSULIN GLARGINE 10 UNITS: 100 INJECTION, SOLUTION SUBCUTANEOUS at 07:57

## 2018-06-08 RX ADMIN — Medication 100 MG: at 07:56

## 2018-06-08 RX ADMIN — INSULIN ASPART 2 UNITS: 100 INJECTION, SOLUTION INTRAVENOUS; SUBCUTANEOUS at 14:03

## 2018-06-08 RX ADMIN — DOBUTAMINE HYDROCHLORIDE 2.5 MCG/KG/MIN: 400 INJECTION INTRAVENOUS at 12:28

## 2018-06-08 RX ADMIN — OMEPRAZOLE 20 MG: 20 CAPSULE, DELAYED RELEASE ORAL at 19:38

## 2018-06-08 RX ADMIN — ASPIRIN 81 MG CHEWABLE TABLET 81 MG: 81 TABLET CHEWABLE at 19:39

## 2018-06-08 RX ADMIN — FLUTICASONE PROPIONATE 2 SPRAY: 50 SPRAY, METERED NASAL at 19:32

## 2018-06-08 RX ADMIN — VITAMIN B12 0.1 MG ORAL TABLET 100 MCG: 0.1 TABLET ORAL at 07:56

## 2018-06-08 RX ADMIN — Medication 25 MG: at 12:29

## 2018-06-08 RX ADMIN — ISOSORBIDE DINITRATE 10 MG: 10 TABLET ORAL at 19:39

## 2018-06-08 RX ADMIN — ATORVASTATIN CALCIUM 40 MG: 40 TABLET, FILM COATED ORAL at 19:39

## 2018-06-08 RX ADMIN — DOPAMINE HYDROCHLORIDE IN DEXTROSE 2.5 MCG/KG/MIN: 1.6 INJECTION, SOLUTION INTRAVENOUS at 00:50

## 2018-06-08 RX ADMIN — Medication 25 MG: at 18:39

## 2018-06-08 RX ADMIN — ALBUTEROL SULFATE 2.5 MG: 2.5 SOLUTION RESPIRATORY (INHALATION) at 22:20

## 2018-06-08 RX ADMIN — ALLOPURINOL 100 MG: 100 TABLET ORAL at 07:56

## 2018-06-08 RX ADMIN — CETIRIZINE HYDROCHLORIDE 10 MG: 10 TABLET, FILM COATED ORAL at 07:56

## 2018-06-08 RX ADMIN — Medication 1 CAPSULE: at 07:56

## 2018-06-08 RX ADMIN — INSULIN ASPART 9 UNITS: 100 INJECTION, SOLUTION INTRAVENOUS; SUBCUTANEOUS at 14:02

## 2018-06-08 NOTE — PLAN OF CARE
Problem: Cardiac: Heart Failure (Adult)  Goal: Signs and Symptoms of Listed Potential Problems Will be Absent, Minimized or Managed (Cardiac: Heart Failure)  Signs and symptoms of listed potential problems will be absent, minimized or managed by discharge/transition of care (reference Cardiac: Heart Failure (Adult) CPG).   Outcome: No Change   06/08/18 1850   Cardiac: Heart Failure   Problems Assessed (Heart Failure) all   Problems Present (Heart Failure) cardiac pump dysfunction;decreased quality of life     D/I/A:  End-stage CHF, ESRD. Status 1A for heart, kidney. AOx4, up independent in room. SR/ST (95 - 120) + PVCs, rare PAC. RA during day. Dobutamine gtt at 2.5, Dopamine at 2.5. Q12 hrs hemodynamics. 18:00 CVP = 4, PA = 38/24, PAOP = 16. CI 2.4. Similar to previous.     Plan:  Monitor hemodynamics, encourage rehab, and notify provider of changes. HD 6/9.

## 2018-06-08 NOTE — PLAN OF CARE
Problem: Patient Care Overview  Goal: Plan of Care/Patient Progress Review  OT:  Pt with other care providers and then waiting for a call, will attempt to see pt later.

## 2018-06-08 NOTE — PROGRESS NOTES
Cardiology Progress Note 06/08/2018    Assessment & Plan   63 year old male with a PMH of CAD, ICM (EF of 15-20%), ESRD, bicuspid aortic valve with AI, severe MR, and proximal AAA who was admitted for decompensated heart failure. He is on dobutamine 2.5 mcg/kg/min and listed for heart/kidney transplant as status 1A.    Major Changes:  - none, continue dobutamine and dopamine at current doses  - will be cautious about BP in case early sign of sepsis  - will check PCWP  - consider reduce after load reduction if MAP is low due to lowering dry weight.    # Stage D NYHA Class III systolic heart failure secondary to ICM, listed 1A  # Severe mitral regurgitation  # Severe aortic insufficiency due to bicuspid Ao valve  # Non-sustained ventricular tachycardia (minimal)  - dobutamine 2.5 mcg/kg/min, dopamine 2.5 mcg/kg/min  - hydralazine 25 mg q6 hours on non-dialysis days + 10 mg BID on dialysis days (after HD)  - isordil 10 mg TID on non-HD days  - weekly CXR for [facility] position(SG placed 5/13)  - nephrology challenging dry weight  - consider reduce after load reduction if MAP is low due to lowering dry weight.       # Chronic Medical Issues:  - Seborrheic keratoses / Dermatofibromas / Multiple benign nevi / Nummular dermatitis: derm consulted, all lesions benign  - Hx of allergic rhinitis: cetrizine  - ESRD: Dialysis T, Th, Sa   - Non-obstructive CAD, Anomalous RCA: ASA 81 mg, atorvastatin 40 mg daily  - Ascending aortic aneurysm: 4.5 x 4.5 cm proximal ascending aortic aneurysm seen on MRI 2/14  - Multiple benign branch duct-IPMNs: no concerning features affecting transplant candidacy  - gout: allopurinol, prednisone 6/26 - 5/31 (tapered for acute flare)      FEN: regular diet, 2L FR  PROPHY: ambulation  LINES: [facility] cath  DISPO: TBD  CODE STATUS:  Full    Case discussed with Dr. Blum.    Tunde Coffey  Cardiology Fellow      Interval History   No acute events   afebrile BP MAP 75-80  Skipped hydral this morning due to  "low BP  On 2.5 dobutamine and 2.5 dopamine   Feeling the same    Physical Exam   Temp: 98.5  F (36.9  C) Temp src: Oral BP: 103/70   Heart Rate: 110 Resp: 16 SpO2: 100 % O2 Device: None (Room air)    Vitals:    06/07/18 0600 06/07/18 0700 06/08/18 0700   Weight: 77.2 kg (170 lb 3.1 oz) 77.8 kg (171 lb 8.3 oz) 76.4 kg (168 lb 6.9 oz)     Wt Readings from Last 2 Encounters:   06/08/18 76.4 kg (168 lb 6.9 oz)   04/16/18 76.9 kg (169 lb 9.6 oz)       Vital Signs with Ranges  Temp:  [97.6  F (36.4  C)-98.7  F (37.1  C)] 98.5  F (36.9  C)  Heart Rate:  [104-118] 110  Resp:  [16-18] 16  BP: ()/(53-75) 103/70  Cuff Mean (mmHg):  [64-67] 67  SpO2:  [97 %-100 %] 100 %  I/O last 3 completed shifts:  In: 986.4 [P.O.:600; I.V.:386.4]  Out: 2300 [Other:2300]    Heart Rate: 110, Blood pressure 103/70, pulse 107, temperature 98.5  F (36.9  C), temperature source Oral, resp. rate 16, height 1.727 m (5' 8\"), weight 76.4 kg (168 lb 6.9 oz), SpO2 100 %.  168 lbs 6.9 oz  GEN:  Alert, oriented x 3, appears comfortable, NAD.  CV:  Regular rate and rhythm, no murmur no JVD  LUNGS:  Clear to auscultation bilaterally   ABD:  Active bowel sounds, soft, non-tender/non-distended.  No rebound/guarding/rigidity.  EXT:  No edema or cyanosis.      Medications     DOBUTamine 2.5 mcg/kg/min (06/08/18 1300)     DOPamine 2.5 mcg/kg/min (06/08/18 1300)     - MEDICATION INSTRUCTIONS -       Reason ACE/ARB/ARNI order not selected       Reason beta blocker order not selected         albuterol  2.5 mg Nebulization At Bedtime     allopurinol  100 mg Oral Daily     aspirin  81 mg Oral Daily     atorvastatin  40 mg Oral Daily     cetirizine  10 mg Oral Daily     cyanocolbalamin  100 mcg Oral Daily     fluticasone  1-2 spray Both Nostrils Daily     hydrALAZINE  25 mg Oral 4 times per day on Sun Mon Wed Fri    And     hydrALAZINE  10 mg Oral 2 times per day on Tue Thu Sat     insulin aspart   Subcutaneous QAM AC     insulin aspart   Subcutaneous Daily with " lunch     insulin aspart   Subcutaneous Daily with supper     insulin aspart  1-10 Units Subcutaneous TID AC     insulin aspart  1-7 Units Subcutaneous At Bedtime     insulin glargine  10 Units Subcutaneous QAM AC     isosorbide dinitrate  10 mg Oral 3 times per day on Sun Mon Wed Fri     NEPHROCAPS  1 capsule Oral Daily     omeprazole  20 mg Oral Daily     sodium chloride (PF)  10 mL Intracatheter Q7 Days     sodium chloride (PF)  3 mL Intracatheter Q8H     vitamin  B-1  100 mg Oral Daily       Data     Recent Labs  Lab 06/07/18  0610 06/06/18  0622 06/05/18  1440 06/05/18  0553   WBC 6.3 5.5  --  6.1   HGB 8.8* 9.2*  --  8.6*   MCV 90 94  --  93    247  --  254    138 136 138   POTASSIUM 4.7 4.5 3.7 5.3   CHLORIDE 102 102 101 105   CO2 23 25 28 20   BUN 50* 35* 23 62*   CR 8.40* 6.44* 4.53* 9.17*   ANIONGAP 12 11 8 12   TRINI 8.9 9.1 9.0 8.7   GLC 93 85 100* 88       No results found for this or any previous visit (from the past 24 hour(s)).      I/O last 3 completed shifts:  In: 986.4 [P.O.:600; I.V.:386.4]  Out: 2300 [Other:2300]      LINES/TUBES:  Data      PICC Double Lumen 04/22/18 Left Basilic (Active)   Site Assessment WDL 6/8/2018 12:00 PM   External Cath Length (cm) 4 cm 5/28/2018 12:00 AM   Extremity Circumference (cm) 34 cm 5/4/2018  2:00 PM   Dressing Intervention Chlorhexidine patch;Transparent 6/8/2018 12:00 PM   Extravasation? No 6/8/2018 12:00 PM   Dressing Change Due 06/10/18 6/8/2018 12:00 PM   PICC Comment CDI 6/6/2018  4:00 AM   PICC Lumen Assessment Trigger Magana;Purple 6/5/2018 12:00 PM   Number of days:47       CVC Single Lumen 05/13/18 Left Internal jugular (Active)   Site Assessment WDL 6/8/2018 12:00 PM   Line Status Saline locked 6/8/2018 12:00 PM   Dressing Intervention Chlorhexidine sponge 6/8/2018 12:00 PM   Extravasation? No 6/8/2018 12:00 PM   Dressing Change Due 06/10/18 6/8/2018  8:00 AM   CVC Comment rogerio 6/7/2018  8:00 AM   Number of days:26       CVC Double Lumen  04/17/18 Right Internal jugular (Active)   Site Assessment WDL 6/8/2018 12:00 PM   Lumen Soln/Vol REFERENCE 2.1/2.1 6/5/2018  9:19 AM   External Cath Length (cm) 3.5 cm 5/26/2018  2:25 PM   Extravasation? No 6/8/2018 12:00 PM   Dressing Intervention Transparent;Chlorhexidine sponge 6/8/2018 12:00 PM   Dressing Change Due 06/09/18 6/8/2018  8:00 AM   CVC Comment HD 6/7/2018 12:00 PM   CVC Lumen Assessment Red;Blue 6/5/2018 12:00 PM   Number of days:52       Pulmonary Artery Catheter - Single Lumen 05/13/18 1700 Left internal jugular vein (Active)   Dressing dressing dry and intact 6/8/2018 12:00 PM   Securement secured with sutures 6/8/2018 12:00 PM   PA Catheter Markings (cm) 63 6/8/2018 12:00 PM   Phlebitis 0-->no symptoms 6/8/2018 12:00 PM   Infiltration 0-->no symptoms 6/8/2018 12:00 PM   Waveform normal 6/8/2018 12:00 PM   Pressure Catheter Interventions system flushed 6/8/2018 12:00 PM   Daily Review Of Necessity completed 6/8/2018 12:00 PM   Number of days:26

## 2018-06-08 NOTE — PLAN OF CARE
Problem: Patient Care Overview  Goal: Individualization & Mutuality    Pt continues status 1A for heart/kidney transplant. Continues on dobutamine and dopamine drips. Independent w/ cares. VSS. See flowsheets for hemodynamics. Continue to monitor, notify team w/ changes.

## 2018-06-08 NOTE — PROGRESS NOTES
Transplant Social Work Services Progress Note      Collaborated with: Murray and Financial Counselor    Data: Murray continues in ICU waiting for heart/kidney transplant. Murray is waiting for his LT Disability and had questions about his ESRD Medicare and possible COBRA to keep his insurance active.   Intervention: met with Murray with Financial Counselor to discuss long term disability and Medicare.  Assessment: Murray reports that he has been trying to answers to his LT Disability status. He has heard from [employer] that he'll begin receiving SSDI in August for July. He faxed in his ESRD paperwork, but hasn't heard back from [employer]. He welcomed Financial Counselor's assistance in making these phone calls.   Education provided by SW: possible porter to help with 1 COBRA payment if needed before SSDI kicks in.   Plan:    Discharge Plans in Progress: waiting heart/kidney transplant    Barriers to d/c plan: waiting for heart/kidney transplant    Follow up Plan: [facility] will continue to follow for support as needed.    Pager [phone]

## 2018-06-09 LAB
ABO + RH BLD: NORMAL
ABO + RH BLD: NORMAL
ANION GAP SERPL CALCULATED.3IONS-SCNC: 11 MMOL/L (ref 3–14)
BASE EXCESS BLDV CALC-SCNC: 0.9 MMOL/L
BASE EXCESS BLDV CALC-SCNC: 2.5 MMOL/L
BASE EXCESS BLDV CALC-SCNC: 4 MMOL/L
BLD GP AB SCN SERPL QL: NORMAL
BLOOD BANK CMNT PATIENT-IMP: NORMAL
BUN SERPL-MCNC: 44 MG/DL (ref 7–30)
CALCIUM SERPL-MCNC: 8.5 MG/DL (ref 8.5–10.1)
CHLORIDE SERPL-SCNC: 98 MMOL/L (ref 94–109)
CO2 SERPL-SCNC: 25 MMOL/L (ref 20–32)
CREAT SERPL-MCNC: 7.17 MG/DL (ref 0.66–1.25)
ERYTHROCYTE [DISTWIDTH] IN BLOOD BY AUTOMATED COUNT: 17.2 % (ref 10–15)
GFR SERPL CREATININE-BSD FRML MDRD: 8 ML/MIN/1.7M2
GLUCOSE BLDC GLUCOMTR-MCNC: 136 MG/DL (ref 70–99)
GLUCOSE BLDC GLUCOMTR-MCNC: 142 MG/DL (ref 70–99)
GLUCOSE BLDC GLUCOMTR-MCNC: 200 MG/DL (ref 70–99)
GLUCOSE BLDC GLUCOMTR-MCNC: 99 MG/DL (ref 70–99)
GLUCOSE SERPL-MCNC: 100 MG/DL (ref 70–99)
HCO3 BLDV-SCNC: 26 MMOL/L (ref 21–28)
HCO3 BLDV-SCNC: 26 MMOL/L (ref 21–28)
HCO3 BLDV-SCNC: 29 MMOL/L (ref 21–28)
HCT VFR BLD AUTO: 28.5 % (ref 40–53)
HGB BLD-MCNC: 9.2 G/DL (ref 13.3–17.7)
MAGNESIUM SERPL-MCNC: 1.5 MG/DL (ref 1.6–2.3)
MCH RBC QN AUTO: 29.2 PG (ref 26.5–33)
MCHC RBC AUTO-ENTMCNC: 32.3 G/DL (ref 31.5–36.5)
MCV RBC AUTO: 91 FL (ref 78–100)
O2/TOTAL GAS SETTING VFR VENT: 21 %
OXYHGB MFR BLDV: 51 %
OXYHGB MFR BLDV: 51 %
OXYHGB MFR BLDV: 52 %
PCO2 BLDV: 37 MM HG (ref 40–50)
PCO2 BLDV: 40 MM HG (ref 40–50)
PCO2 BLDV: 43 MM HG (ref 40–50)
PH BLDV: 7.41 PH (ref 7.32–7.43)
PH BLDV: 7.43 PH (ref 7.32–7.43)
PH BLDV: 7.46 PH (ref 7.32–7.43)
PLATELET # BLD AUTO: 229 10E9/L (ref 150–450)
PO2 BLDV: 30 MM HG (ref 25–47)
PO2 BLDV: 31 MM HG (ref 25–47)
PO2 BLDV: 32 MM HG (ref 25–47)
POTASSIUM SERPL-SCNC: 4.5 MMOL/L (ref 3.4–5.3)
RBC # BLD AUTO: 3.15 10E12/L (ref 4.4–5.9)
SODIUM SERPL-SCNC: 134 MMOL/L (ref 133–144)
SPECIMEN EXP DATE BLD: NORMAL
WBC # BLD AUTO: 5.6 10E9/L (ref 4–11)

## 2018-06-09 PROCEDURE — 63400005 ZZH RX 634

## 2018-06-09 PROCEDURE — 25000132 ZZH RX MED GY IP 250 OP 250 PS 637: Performed by: NURSE PRACTITIONER

## 2018-06-09 PROCEDURE — A9270 NON-COVERED ITEM OR SERVICE: HCPCS | Mod: GY | Performed by: INTERNAL MEDICINE

## 2018-06-09 PROCEDURE — 25000125 ZZHC RX 250: Performed by: STUDENT IN AN ORGANIZED HEALTH CARE EDUCATION/TRAINING PROGRAM

## 2018-06-09 PROCEDURE — 40000196 ZZH STATISTIC RAPCV CVP MONITORING

## 2018-06-09 PROCEDURE — 99232 SBSQ HOSP IP/OBS MODERATE 35: CPT | Mod: GC | Performed by: INTERNAL MEDICINE

## 2018-06-09 PROCEDURE — 83735 ASSAY OF MAGNESIUM: CPT | Performed by: NURSE PRACTITIONER

## 2018-06-09 PROCEDURE — 25000128 H RX IP 250 OP 636: Performed by: STUDENT IN AN ORGANIZED HEALTH CARE EDUCATION/TRAINING PROGRAM

## 2018-06-09 PROCEDURE — 20000004 ZZH R&B ICU UMMC

## 2018-06-09 PROCEDURE — 25000128 H RX IP 250 OP 636

## 2018-06-09 PROCEDURE — 82805 BLOOD GASES W/O2 SATURATION: CPT | Performed by: INTERNAL MEDICINE

## 2018-06-09 PROCEDURE — A9270 NON-COVERED ITEM OR SERVICE: HCPCS | Mod: GY | Performed by: STUDENT IN AN ORGANIZED HEALTH CARE EDUCATION/TRAINING PROGRAM

## 2018-06-09 PROCEDURE — 86900 BLOOD TYPING SEROLOGIC ABO: CPT | Performed by: INTERNAL MEDICINE

## 2018-06-09 PROCEDURE — 90937 HEMODIALYSIS REPEATED EVAL: CPT

## 2018-06-09 PROCEDURE — 25000132 ZZH RX MED GY IP 250 OP 250 PS 637: Mod: GY | Performed by: STUDENT IN AN ORGANIZED HEALTH CARE EDUCATION/TRAINING PROGRAM

## 2018-06-09 PROCEDURE — 25000132 ZZH RX MED GY IP 250 OP 250 PS 637: Mod: GY | Performed by: INTERNAL MEDICINE

## 2018-06-09 PROCEDURE — 25000128 H RX IP 250 OP 636: Performed by: NURSE PRACTITIONER

## 2018-06-09 PROCEDURE — 25000132 ZZH RX MED GY IP 250 OP 250 PS 637: Mod: GY | Performed by: NURSE PRACTITIONER

## 2018-06-09 PROCEDURE — 86850 RBC ANTIBODY SCREEN: CPT | Performed by: INTERNAL MEDICINE

## 2018-06-09 PROCEDURE — 00000146 ZZHCL STATISTIC GLUCOSE BY METER IP

## 2018-06-09 PROCEDURE — 80048 BASIC METABOLIC PNL TOTAL CA: CPT | Performed by: NURSE PRACTITIONER

## 2018-06-09 PROCEDURE — 85027 COMPLETE CBC AUTOMATED: CPT | Performed by: NURSE PRACTITIONER

## 2018-06-09 PROCEDURE — A9270 NON-COVERED ITEM OR SERVICE: HCPCS | Mod: GY | Performed by: NURSE PRACTITIONER

## 2018-06-09 PROCEDURE — 40000014 ZZH STATISTIC ARTERIAL MONITORING DAILY

## 2018-06-09 PROCEDURE — 94640 AIRWAY INHALATION TREATMENT: CPT

## 2018-06-09 PROCEDURE — 40000048 ZZH STATISTIC DAILY SWAN MONITORING

## 2018-06-09 PROCEDURE — 86901 BLOOD TYPING SEROLOGIC RH(D): CPT | Performed by: INTERNAL MEDICINE

## 2018-06-09 RX ORDER — HEPARIN SODIUM 1000 [USP'U]/ML
500 INJECTION, SOLUTION INTRAVENOUS; SUBCUTANEOUS CONTINUOUS
Status: DISCONTINUED | OUTPATIENT
Start: 2018-06-09 | End: 2018-06-09

## 2018-06-09 RX ORDER — HEPARIN SODIUM 1000 [USP'U]/ML
500 INJECTION, SOLUTION INTRAVENOUS; SUBCUTANEOUS
Status: COMPLETED | OUTPATIENT
Start: 2018-06-09 | End: 2018-06-09

## 2018-06-09 RX ADMIN — CETIRIZINE HYDROCHLORIDE 10 MG: 10 TABLET, FILM COATED ORAL at 08:43

## 2018-06-09 RX ADMIN — HEPARIN SODIUM 3000 UNITS: 1000 INJECTION, SOLUTION INTRAVENOUS; SUBCUTANEOUS at 19:06

## 2018-06-09 RX ADMIN — INSULIN ASPART 3 UNITS: 100 INJECTION, SOLUTION INTRAVENOUS; SUBCUTANEOUS at 13:33

## 2018-06-09 RX ADMIN — HEPARIN SODIUM 500 UNITS/HR: 1000 INJECTION, SOLUTION INTRAVENOUS; SUBCUTANEOUS at 15:58

## 2018-06-09 RX ADMIN — Medication 1 CAPSULE: at 08:43

## 2018-06-09 RX ADMIN — ATORVASTATIN CALCIUM 40 MG: 40 TABLET, FILM COATED ORAL at 20:14

## 2018-06-09 RX ADMIN — INSULIN GLARGINE 10 UNITS: 100 INJECTION, SOLUTION SUBCUTANEOUS at 10:39

## 2018-06-09 RX ADMIN — MAGNESIUM SULFATE HEPTAHYDRATE 4 G: 40 INJECTION, SOLUTION INTRAVENOUS at 19:39

## 2018-06-09 RX ADMIN — HEPARIN SODIUM 3000 UNITS: 1000 INJECTION, SOLUTION INTRAVENOUS; SUBCUTANEOUS at 19:07

## 2018-06-09 RX ADMIN — ALBUTEROL SULFATE 2.5 MG: 2.5 SOLUTION RESPIRATORY (INHALATION) at 21:57

## 2018-06-09 RX ADMIN — TRAMADOL HYDROCHLORIDE 50 MG: 50 TABLET, COATED ORAL at 21:11

## 2018-06-09 RX ADMIN — INSULIN ASPART 2 UNITS: 100 INJECTION, SOLUTION INTRAVENOUS; SUBCUTANEOUS at 14:58

## 2018-06-09 RX ADMIN — Medication 100 MG: at 08:42

## 2018-06-09 RX ADMIN — DOPAMINE HYDROCHLORIDE IN DEXTROSE 2.5 MCG/KG/MIN: 1.6 INJECTION, SOLUTION INTRAVENOUS at 12:05

## 2018-06-09 RX ADMIN — HYDRALAZINE HYDROCHLORIDE 10 MG: 10 TABLET, FILM COATED ORAL at 20:14

## 2018-06-09 RX ADMIN — VITAMIN B12 0.1 MG ORAL TABLET 100 MCG: 0.1 TABLET ORAL at 08:42

## 2018-06-09 RX ADMIN — ALLOPURINOL 100 MG: 100 TABLET ORAL at 08:43

## 2018-06-09 RX ADMIN — SODIUM CHLORIDE 300 ML: 9 INJECTION, SOLUTION INTRAVENOUS at 15:58

## 2018-06-09 RX ADMIN — ASPIRIN 81 MG CHEWABLE TABLET 81 MG: 81 TABLET CHEWABLE at 20:14

## 2018-06-09 RX ADMIN — FLUTICASONE PROPIONATE 2 SPRAY: 50 SPRAY, METERED NASAL at 20:14

## 2018-06-09 RX ADMIN — HEPARIN SODIUM 500 UNITS: 1000 INJECTION, SOLUTION INTRAVENOUS; SUBCUTANEOUS at 15:59

## 2018-06-09 RX ADMIN — SODIUM CHLORIDE 250 ML: 9 INJECTION, SOLUTION INTRAVENOUS at 15:58

## 2018-06-09 RX ADMIN — EPOETIN ALFA 4000 UNITS: 10000 SOLUTION INTRAVENOUS; SUBCUTANEOUS at 15:59

## 2018-06-09 RX ADMIN — OMEPRAZOLE 20 MG: 20 CAPSULE, DELAYED RELEASE ORAL at 20:14

## 2018-06-09 NOTE — PROGRESS NOTES
Cardiology Progress Note 06/09/2018    Assessment & Plan   63 year old male with a PMH of CAD, ICM (EF of 15-20%), ESRD, bicuspid aortic valve with AI, severe MR, and proximal AAA who was admitted for decompensated heart failure. He is on dobutamine 2.5 mcg/kg/min and listed for heart/kidney transplant as status 1A.    Major Changes:  - none, continue dobutamine and dopamine at current doses  - will be cautious about BP in case early sign of sepsis  - consider reduce after load reduction if MAP is low due to lowering dry weight.    # Stage D NYHA Class III systolic heart failure secondary to ICM, listed 1A  # Severe mitral regurgitation  # Severe aortic insufficiency due to bicuspid Ao valve  # Non-sustained ventricular tachycardia (minimal)  - dobutamine 2.5 mcg/kg/min, dopamine 2.5 mcg/kg/min  - hydralazine 25 mg q6 hours on non-dialysis days + 10 mg BID on dialysis days (after HD)  - isordil 10 mg TID on non-HD days  - weekly CXR for [facility] position(SG placed 5/13)  - nephrology challenging dry weight  - consider reduce after load reduction if MAP is low due to lowering dry weight.       # Chronic Medical Issues:  - Seborrheic keratoses / Dermatofibromas / Multiple benign nevi / Nummular dermatitis: derm consulted, all lesions benign  - Hx of allergic rhinitis: cetrizine  - ESRD: Dialysis T, Th, Sa   - Non-obstructive CAD, Anomalous RCA: ASA 81 mg, atorvastatin 40 mg daily  - Ascending aortic aneurysm: 4.5 x 4.5 cm proximal ascending aortic aneurysm seen on MRI 2/14  - Multiple benign branch duct-IPMNs: no concerning features affecting transplant candidacy  - gout: allopurinol, prednisone 6/26 - 5/31 (tapered for acute flare)      FEN: regular diet, 2L FR  PROPHY: ambulation  LINES: [facility] cath  DISPO: TBD  CODE STATUS:  Full    Case discussed with Dr. Blum.    Tunde Coffey  Cardiology Fellow      Interval History   No acute events   afebrile BP MAP 75-80  Skipped hydral this morning due to low BP  On 2.5  "dobutamine and 2.5 dopamine   Feeling the same    Physical Exam   Temp: 98.1  F (36.7  C) Temp src: Oral BP: 102/74   Heart Rate: 112 Resp: 16 SpO2: 100 % O2 Device: None (Room air)    Vitals:    06/07/18 0600 06/07/18 0700 06/08/18 0700   Weight: 77.2 kg (170 lb 3.1 oz) 77.8 kg (171 lb 8.3 oz) 76.4 kg (168 lb 6.9 oz)     Wt Readings from Last 2 Encounters:   06/08/18 76.4 kg (168 lb 6.9 oz)   04/16/18 76.9 kg (169 lb 9.6 oz)       Vital Signs with Ranges  Temp:  [98  F (36.7  C)-98.5  F (36.9  C)] 98.1  F (36.7  C)  Heart Rate:  [103-120] 112  Resp:  [14-16] 16  BP: ()/(51-74) 102/74  SpO2:  [98 %-100 %] 100 %  I/O last 3 completed shifts:  In: 1226.4 [P.O.:840; I.V.:386.4]  Out: 25 [Urine:25]    Heart Rate: 112, Blood pressure 102/74, pulse 107, temperature 98.1  F (36.7  C), temperature source Oral, resp. rate 16, height 1.727 m (5' 8\"), weight 76.4 kg (168 lb 6.9 oz), SpO2 100 %.  168 lbs 6.9 oz  GEN:  Alert, oriented x 3, appears comfortable, NAD.  CV:  Regular rate and rhythm, no murmur no JVD  LUNGS:  Clear to auscultation bilaterally   ABD:  Active bowel sounds, soft, non-tender/non-distended.  No rebound/guarding/rigidity.  EXT:  No edema or cyanosis.      Medications     DOBUTamine 2.5 mcg/kg/min (06/09/18 0700)     DOPamine 2.5 mcg/kg/min (06/09/18 0700)     heparin (porcine)       - MEDICATION INSTRUCTIONS -       Reason ACE/ARB/ARNI order not selected       Reason beta blocker order not selected         sodium chloride 0.9%  250 mL Intravenous Once in dialysis     sodium chloride 0.9%  300 mL Hemodialysis Machine Once     albuterol  2.5 mg Nebulization At Bedtime     allopurinol  100 mg Oral Daily     aspirin  81 mg Oral Daily     atorvastatin  40 mg Oral Daily     cetirizine  10 mg Oral Daily     cyanocolbalamin  100 mcg Oral Daily     epoetin emily (EPOGEN,PROCRIT) inj ESRD  4,000 Units Intravenous Once in dialysis     fluticasone  1-2 spray Both Nostrils Daily     gelatin absorbable  1 each " Topical During Hemodialysis (from stock)     heparin (porcine)  500 Units Hemodialysis Machine Once in dialysis     heparin  3 mL Intracatheter During Hemodialysis (from stock)     heparin  3 mL Intracatheter During Hemodialysis (from stock)     hydrALAZINE  25 mg Oral 4 times per day on Sun Mon Wed Fri    And     hydrALAZINE  10 mg Oral 2 times per day on Tue Thu Sat     insulin aspart   Subcutaneous QAM AC     insulin aspart   Subcutaneous Daily with lunch     insulin aspart   Subcutaneous Daily with supper     insulin aspart  1-10 Units Subcutaneous TID AC     insulin aspart  1-7 Units Subcutaneous At Bedtime     insulin glargine  10 Units Subcutaneous QAM AC     isosorbide dinitrate  10 mg Oral 3 times per day on Sun Mon Wed Fri     NEPHROCAPS  1 capsule Oral Daily     omeprazole  20 mg Oral Daily     sodium chloride (PF)  10 mL Intracatheter Q7 Days     sodium chloride (PF)  3 mL Intracatheter Q8H     vitamin  B-1  100 mg Oral Daily       Data     Recent Labs  Lab 06/09/18  0614 06/07/18  0610 06/06/18  0622   WBC 5.6 6.3 5.5   HGB 9.2* 8.8* 9.2*   MCV 91 90 94    242 247    137 138   POTASSIUM 4.5 4.7 4.5   CHLORIDE 98 102 102   CO2 25 23 25   BUN 44* 50* 35*   CR 7.17* 8.40* 6.44*   ANIONGAP 11 12 11   TRINI 8.5 8.9 9.1   * 93 85       No results found for this or any previous visit (from the past 24 hour(s)).      I/O last 3 completed shifts:  In: 1226.4 [P.O.:840; I.V.:386.4]  Out: 25 [Urine:25]      LINES/TUBES:  Data      PICC Double Lumen 04/22/18 Left Basilic (Active)   Site Assessment WDL 6/9/2018  4:00 AM   External Cath Length (cm) 4 cm 5/28/2018 12:00 AM   Extremity Circumference (cm) 34 cm 5/4/2018  2:00 PM   Dressing Intervention Chlorhexidine patch;Transparent 6/9/2018  4:00 AM   Extravasation? No 6/9/2018  4:00 AM   Dressing Change Due 06/10/18 6/9/2018  4:00 AM   PICC Comment CDI 6/6/2018  4:00 AM   PICC Lumen Assessment Trigger Magana;Purple 6/5/2018 12:00 PM   Number of  days:48       CVC Single Lumen 05/13/18 Left Internal jugular (Active)   Site Assessment WDL 6/9/2018  4:00 AM   Line Status Saline locked 6/9/2018  4:00 AM   Dressing Intervention Chlorhexidine sponge;Transparent 6/9/2018  4:00 AM   Extravasation? No 6/9/2018  4:00 AM   Dressing Change Due 06/10/18 6/9/2018  4:00 AM   CVC Comment rogerio 6/7/2018  8:00 AM   Number of days:27       CVC Double Lumen 04/17/18 Right Internal jugular (Active)   Site Assessment WDL 6/9/2018  4:00 AM   Lumen Soln/Vol REFERENCE 2.1/2.1 6/5/2018  9:19 AM   External Cath Length (cm) 3.5 cm 5/26/2018  2:25 PM   Extravasation? No 6/9/2018  4:00 AM   Dressing Intervention Transparent 6/9/2018  4:00 AM   Dressing Change Due 06/09/18 6/9/2018  4:00 AM   CVC Comment HD 6/9/2018  4:00 AM   CVC Lumen Assessment Red;Blue 6/5/2018 12:00 PM   Number of days:53       Pulmonary Artery Catheter - Single Lumen 05/13/18 1700 Left internal jugular vein (Active)   Dressing dressing dry and intact 6/9/2018  4:00 AM   Securement secured with sutures 6/9/2018  4:00 AM   PA Catheter Markings (cm) 63 6/9/2018  4:00 AM   Phlebitis 0-->no symptoms 6/9/2018  4:00 AM   Infiltration 0-->no symptoms 6/9/2018  4:00 AM   Waveform normal 6/9/2018  4:00 AM   Pressure Catheter Interventions system flushed 6/9/2018  4:00 AM   Daily Review Of Necessity completed 6/9/2018  4:00 AM   Number of days:27

## 2018-06-09 NOTE — PROGRESS NOTES
"  Nephrology Progress Note  06/09/2018         Assessment & Recommendations:   Murray Nicholson is a 63 year old year old male with PMH of HTN, DMII, functionally bicuspid aortic valve, CHF/CM (EF 10-15%), ESRD, admitted with worsening dyspnea/SOB, now on dobutamine and dopamine pending heart/kidney transplant.        ESRD: due to DM, on PD since Aug 2017, changed to HD 1/18, now TTS, at [facility] under care Dr Mcpherson, [facility] tunnel, EDW 75.5kg, 3.4 Hr.   -- HD scheduled TTS- running today per schedule  -- low dose heparin HD.   -- heparin lock CVC,     BP and volume:  EDW  75.5kg. Weight is currently 77.7 kg - will aim for his EDW today which is agress with.      Electrolyte and acid base: no issue       Anemia: cont venofer 100 mg Qtues, cont Epo 4000 u with dialysis. Hemoglobin 9.2     BMD: stable no issue , hectorol and phoslo stopped. Phos 4.2 on 6/3      Severe AV and MR/ Cardiomyopathy EF 15%: listed for heart kidney tx. On Dobutamine/Dopamine infusions      Aleyda Darling MD       Interval History :   Comfortable today; sitting in chair eating breakfast. Comfortable with breathing; no edema concerns.     Review of Systems:   I reviewed the following systems:  GI: Has good appetite  Neuro:  no confusion  Constitutional:  no fever or chills  CV: denies dyspnea or CP.     Physical Exam:   I/O last 3 completed shifts:  In: 1226.4 [P.O.:840; I.V.:386.4]  Out: 25 [Urine:25]   /68 (BP Location: Right arm)  Pulse 107  Temp 97.8  F (36.6  C) (Oral)  Resp 18  Ht 1.727 m (5' 8\")  Wt 76.4 kg (168 lb 6.9 oz)  SpO2 99%  BMI 25.61 kg/m2     GENERAL APPEARANCE: Calm, interactive  EYES:  no scleral icterus, pupils equal  PULM: lungs CTA. Breathing is non labored. Not on supplemental oxygen  CV: Tachy     -edema - 1+ LE edema  GI: soft, NT, non distended  INTEGUMENT: no cyanosis or rash  NEURO:  Alert/oriented  Access - Right TDC    Labs:   All labs reviewed by me  Electrolytes/Renal -   Recent Labs   Lab " Test  06/09/18   0614  06/07/18   0610  06/06/18   0622   06/05/18   0553  06/03/18   0436   05/29/18   0434   05/26/18   1632   NA  134  137  138   < >  138  139   < >  139   < >   --    POTASSIUM  4.5  4.7  4.5   < >  5.3  4.9   < >  4.8   < >  4.0   CHLORIDE  98  102  102   < >  105  104   < >  101   < >   --    CO2  25  23  25   < >  20  26   < >  25   < >   --    BUN  44*  50*  35*   < >  62*  28   < >  66*   < >   --    CR  7.17*  8.40*  6.44*   < >  9.17*  5.10*   < >  9.50*   < >   --    GLC  100*  93  85   < >  88  124*   < >  101*   < >   --    TRINI  8.5  8.9  9.1   < >  8.7  8.8   < >  9.1   < >   --    MAG  1.5*  1.9   --    --   2.3  2.0   < >  2.2   < >  1.7   PHOS   --    --    --    --    --   4.2   --   3.9   --   3.5    < > = values in this interval not displayed.       CBC -   Recent Labs   Lab Test  06/09/18   0614  06/07/18   0610  06/06/18   0622   WBC  5.6  6.3  5.5   HGB  9.2*  8.8*  9.2*   PLT  229  242  247       LFTs -   Recent Labs   Lab Test  04/16/18   1546  03/07/18   0833  02/19/18   1558  02/02/18   1736   ALKPHOS  123  129  102  86   BILITOTAL  0.8  0.7  0.6  0.4   ALT  22  21  15  16   AST  17  18  18  20   PROTTOTAL  7.4   --   7.2  6.2*   ALBUMIN  3.5   --   2.7*  2.1*       Iron Panel -   Recent Labs   Lab Test  05/08/18   0954  07/19/17   1306  07/05/17   1204   IRON  58  46  26*   IRONSAT  27  18  12*   ALBERTINA  621*  369  542*         Imaging:  Exam: XR CHEST PORT 1 VW, 6/6/2018 11:02 AM     Indication: SG cath position check weekly;      Comparison: Radiograph the chest 5/28/2018     Findings:   AP view of the chest. Right IJ central venous catheter with the tip in  the high right atrium. Left IJ [place]-Jax catheter with the tip over  the main pulmonary artery. Left PICC tip projects over the mid SVC.  Cardiomediastinal silhouette is stable enlarged. Pulmonary vasculature  is distinct but with mild perihilar opacities. Mild bibasilar  opacities. No new focal airspace opacity. No  pneumothorax or pleural  effusion. Upper abdomen is unremarkable.         Impression:   1. Left IJ [place]-Jax catheter projects with the tip over the main  pulmonary artery. Remaining support devices are stable.  2. Mild bibasilar atelectasis and pulmonary vascular congestion.     I have personally reviewed the examination and initial interpretation  and I agree with the findings.     KAYLIN AIKEN MD ([place])    Current Medications:    sodium chloride 0.9%  250 mL Intravenous Once in dialysis     sodium chloride 0.9%  300 mL Hemodialysis Machine Once     albuterol  2.5 mg Nebulization At Bedtime     allopurinol  100 mg Oral Daily     aspirin  81 mg Oral Daily     atorvastatin  40 mg Oral Daily     cetirizine  10 mg Oral Daily     cyanocolbalamin  100 mcg Oral Daily     epoetin emily (EPOGEN,PROCRIT) inj ESRD  4,000 Units Intravenous Once in dialysis     fluticasone  1-2 spray Both Nostrils Daily     gelatin absorbable  1 each Topical During Hemodialysis (from stock)     heparin (porcine)  500 Units Hemodialysis Machine Once in dialysis     heparin  3 mL Intracatheter During Hemodialysis (from stock)     heparin  3 mL Intracatheter During Hemodialysis (from stock)     hydrALAZINE  25 mg Oral 4 times per day on Sun Mon Wed Fri    And     hydrALAZINE  10 mg Oral 2 times per day on Tue Thu Sat     insulin aspart   Subcutaneous QAM AC     insulin aspart   Subcutaneous Daily with lunch     insulin aspart   Subcutaneous Daily with supper     insulin aspart  1-10 Units Subcutaneous TID AC     insulin aspart  1-7 Units Subcutaneous At Bedtime     insulin glargine  10 Units Subcutaneous QAM AC     isosorbide dinitrate  10 mg Oral 3 times per day on Sun Mon Wed Fri     NEPHROCAPS  1 capsule Oral Daily     omeprazole  20 mg Oral Daily     sodium chloride (PF)  10 mL Intracatheter Q7 Days     sodium chloride (PF)  3 mL Intracatheter Q8H     vitamin  B-1  100 mg Oral Daily       DOBUTamine 2.5 mcg/kg/min (06/09/18 1000)     DOPamine  2.5 mcg/kg/min (06/09/18 1000)     heparin (porcine)       - MEDICATION INSTRUCTIONS -       Reason ACE/ARB/ARNI order not selected       Reason beta blocker order not selected       Aleyda Darling MD

## 2018-06-09 NOTE — PLAN OF CARE
Problem: Cardiac: Heart Failure (Adult)  Goal: Signs and Symptoms of Listed Potential Problems Will be Absent, Minimized or Managed (Cardiac: Heart Failure)  Signs and symptoms of listed potential problems will be absent, minimized or managed by discharge/transition of care (reference Cardiac: Heart Failure (Adult) CPG).   Outcome: No Change  D: Admitted 4/16 Decompensated HF, now Status 1A.     No acute events overnight:  1) 22:00 Hydralazine held d/t low MAPs.  1. 81/53 (62)    2. 78/57 (65)  2) Dialysis planned for today.    I: Monitored vitals and assessed pt status.   Running:  Dobutamine (2.5 mcg/kg/min)  Dopamine (2.5 mcg/kg/min)     A:  Hemodynamics: CVP = (7) PA = (56/32) CI = (2.1) CO = (4.0) SVR = (1469) SVO2 = (52)  Neuro: Afebrile. A0x4. Denies any pain.  Resp: RA, clear lung sounds.   CV: ST, MAPs >65, other than noted VS above. Refusing vitals overnight to sleep.  : Anuric, HD in AM.   GI: BM+  Endo: No coverage needed @ HS.   Diet: Regular + 2L FR.    P: Continue to monitor Pt status and report changes to treatment team.

## 2018-06-10 LAB
ALBUMIN SERPL-MCNC: 2.7 G/DL (ref 3.4–5)
ALP SERPL-CCNC: 121 U/L (ref 40–150)
ALT SERPL W P-5'-P-CCNC: 16 U/L (ref 0–70)
ANION GAP SERPL CALCULATED.3IONS-SCNC: 10 MMOL/L (ref 3–14)
AST SERPL W P-5'-P-CCNC: 16 U/L (ref 0–45)
BASE EXCESS BLDV CALC-SCNC: 1.3 MMOL/L
BASE EXCESS BLDV CALC-SCNC: 2.2 MMOL/L
BILIRUB SERPL-MCNC: 0.7 MG/DL (ref 0.2–1.3)
BUN SERPL-MCNC: 35 MG/DL (ref 7–30)
CALCIUM SERPL-MCNC: 8.7 MG/DL (ref 8.5–10.1)
CHLORIDE SERPL-SCNC: 92 MMOL/L (ref 94–109)
CO2 SERPL-SCNC: 25 MMOL/L (ref 20–32)
CREAT SERPL-MCNC: 6.13 MG/DL (ref 0.66–1.25)
ERYTHROCYTE [DISTWIDTH] IN BLOOD BY AUTOMATED COUNT: 17 % (ref 10–15)
GFR SERPL CREATININE-BSD FRML MDRD: 9 ML/MIN/1.7M2
GLUCOSE BLDC GLUCOMTR-MCNC: 103 MG/DL (ref 70–99)
GLUCOSE BLDC GLUCOMTR-MCNC: 150 MG/DL (ref 70–99)
GLUCOSE BLDC GLUCOMTR-MCNC: 189 MG/DL (ref 70–99)
GLUCOSE BLDC GLUCOMTR-MCNC: 200 MG/DL (ref 70–99)
GLUCOSE BLDC GLUCOMTR-MCNC: 211 MG/DL (ref 70–99)
GLUCOSE SERPL-MCNC: 181 MG/DL (ref 70–99)
HCO3 BLDV-SCNC: 26 MMOL/L (ref 21–28)
HCO3 BLDV-SCNC: 27 MMOL/L (ref 21–28)
HCT VFR BLD AUTO: 30.4 % (ref 40–53)
HGB BLD-MCNC: 9.8 G/DL (ref 13.3–17.7)
MAGNESIUM SERPL-MCNC: 2.7 MG/DL (ref 1.6–2.3)
MCH RBC QN AUTO: 29.3 PG (ref 26.5–33)
MCHC RBC AUTO-ENTMCNC: 32.2 G/DL (ref 31.5–36.5)
MCV RBC AUTO: 91 FL (ref 78–100)
O2/TOTAL GAS SETTING VFR VENT: 21 %
O2/TOTAL GAS SETTING VFR VENT: 21 %
OXYHGB MFR BLDV: 45 %
OXYHGB MFR BLDV: 50 %
PCO2 BLDV: 39 MM HG (ref 40–50)
PCO2 BLDV: 42 MM HG (ref 40–50)
PH BLDV: 7.4 PH (ref 7.32–7.43)
PH BLDV: 7.44 PH (ref 7.32–7.43)
PHOSPHATE SERPL-MCNC: 4.9 MG/DL (ref 2.5–4.5)
PLATELET # BLD AUTO: 244 10E9/L (ref 150–450)
PO2 BLDV: 29 MM HG (ref 25–47)
PO2 BLDV: 32 MM HG (ref 25–47)
POTASSIUM SERPL-SCNC: 4.9 MMOL/L (ref 3.4–5.3)
PROT SERPL-MCNC: 6.8 G/DL (ref 6.8–8.8)
RBC # BLD AUTO: 3.34 10E12/L (ref 4.4–5.9)
SODIUM SERPL-SCNC: 128 MMOL/L (ref 133–144)
WBC # BLD AUTO: 5 10E9/L (ref 4–11)

## 2018-06-10 PROCEDURE — 84100 ASSAY OF PHOSPHORUS: CPT | Performed by: INTERNAL MEDICINE

## 2018-06-10 PROCEDURE — 40000275 ZZH STATISTIC RCP TIME EA 10 MIN

## 2018-06-10 PROCEDURE — 94640 AIRWAY INHALATION TREATMENT: CPT

## 2018-06-10 PROCEDURE — 83735 ASSAY OF MAGNESIUM: CPT | Performed by: INTERNAL MEDICINE

## 2018-06-10 PROCEDURE — 25000132 ZZH RX MED GY IP 250 OP 250 PS 637: Mod: GY | Performed by: STUDENT IN AN ORGANIZED HEALTH CARE EDUCATION/TRAINING PROGRAM

## 2018-06-10 PROCEDURE — 85027 COMPLETE CBC AUTOMATED: CPT | Performed by: INTERNAL MEDICINE

## 2018-06-10 PROCEDURE — 25000132 ZZH RX MED GY IP 250 OP 250 PS 637: Mod: GY | Performed by: NURSE PRACTITIONER

## 2018-06-10 PROCEDURE — A9270 NON-COVERED ITEM OR SERVICE: HCPCS | Mod: GY | Performed by: STUDENT IN AN ORGANIZED HEALTH CARE EDUCATION/TRAINING PROGRAM

## 2018-06-10 PROCEDURE — 40000014 ZZH STATISTIC ARTERIAL MONITORING DAILY

## 2018-06-10 PROCEDURE — A9270 NON-COVERED ITEM OR SERVICE: HCPCS | Mod: GY | Performed by: NURSE PRACTITIONER

## 2018-06-10 PROCEDURE — 40000048 ZZH STATISTIC DAILY SWAN MONITORING

## 2018-06-10 PROCEDURE — 25000125 ZZHC RX 250: Performed by: STUDENT IN AN ORGANIZED HEALTH CARE EDUCATION/TRAINING PROGRAM

## 2018-06-10 PROCEDURE — 99232 SBSQ HOSP IP/OBS MODERATE 35: CPT | Mod: GC | Performed by: INTERNAL MEDICINE

## 2018-06-10 PROCEDURE — 20000004 ZZH R&B ICU UMMC

## 2018-06-10 PROCEDURE — 82805 BLOOD GASES W/O2 SATURATION: CPT | Performed by: INTERNAL MEDICINE

## 2018-06-10 PROCEDURE — 25000132 ZZH RX MED GY IP 250 OP 250 PS 637: Performed by: NURSE PRACTITIONER

## 2018-06-10 PROCEDURE — 00000146 ZZHCL STATISTIC GLUCOSE BY METER IP

## 2018-06-10 PROCEDURE — 80053 COMPREHEN METABOLIC PANEL: CPT | Performed by: INTERNAL MEDICINE

## 2018-06-10 PROCEDURE — 40000196 ZZH STATISTIC RAPCV CVP MONITORING

## 2018-06-10 RX ADMIN — ALBUTEROL SULFATE 2.5 MG: 2.5 SOLUTION RESPIRATORY (INHALATION) at 22:54

## 2018-06-10 RX ADMIN — INSULIN ASPART 3 UNITS: 100 INJECTION, SOLUTION INTRAVENOUS; SUBCUTANEOUS at 15:44

## 2018-06-10 RX ADMIN — INSULIN ASPART 1 UNITS: 100 INJECTION, SOLUTION INTRAVENOUS; SUBCUTANEOUS at 08:03

## 2018-06-10 RX ADMIN — Medication 100 MG: at 08:50

## 2018-06-10 RX ADMIN — Medication 1 CAPSULE: at 08:50

## 2018-06-10 RX ADMIN — ASPIRIN 81 MG CHEWABLE TABLET 81 MG: 81 TABLET CHEWABLE at 19:51

## 2018-06-10 RX ADMIN — ATORVASTATIN CALCIUM 40 MG: 40 TABLET, FILM COATED ORAL at 19:51

## 2018-06-10 RX ADMIN — CETIRIZINE HYDROCHLORIDE 10 MG: 10 TABLET, FILM COATED ORAL at 08:50

## 2018-06-10 RX ADMIN — OMEPRAZOLE 20 MG: 20 CAPSULE, DELAYED RELEASE ORAL at 19:51

## 2018-06-10 RX ADMIN — ALLOPURINOL 100 MG: 100 TABLET ORAL at 08:50

## 2018-06-10 RX ADMIN — INSULIN GLARGINE 10 UNITS: 100 INJECTION, SOLUTION SUBCUTANEOUS at 08:05

## 2018-06-10 RX ADMIN — VITAMIN B12 0.1 MG ORAL TABLET 100 MCG: 0.1 TABLET ORAL at 08:50

## 2018-06-10 RX ADMIN — FLUTICASONE PROPIONATE 2 SPRAY: 50 SPRAY, METERED NASAL at 19:52

## 2018-06-10 NOTE — PLAN OF CARE
Problem: Patient Care Overview  Goal: Plan of Care/Patient Progress Review  Pt is alert and oriented x4. Pt's hemodynamic numbers are: SVR 1274.1, .5, CI 2.1,  CO 4.1 and CVP 6. Dobutamine/Dopamine gtt still running at 2.5 mcg/kg/min. Pt's BP was 79/84 MAP 63 and hydralazine was held all day. Last BP was 105/72 MAP 81 at 1600; 1800 hydralazine held per MD Blum. Pt currently rests in room. No issues noted this shift. Will notify provider with any arising issues as needed.

## 2018-06-10 NOTE — PROGRESS NOTES
Cardiology Progress Note 06/10/2018    Assessment & Plan   63 year old male with a PMH of CAD, ICM (EF of 15-20%), ESRD, bicuspid aortic valve with AI, severe MR, and proximal AAA who was admitted for decompensated heart failure. He is on dobutamine 2.5 mcg/kg/min and listed for heart/kidney transplant as status 1A.    Major Changes:  - none, continue dobutamine and dopamine at current doses  - will be cautious about low BP in case early sign of sepsis  - will hold isodil and hydralazine for now due to low BP(could be lowering dry weight per HD)  - SVR 3.5 6/9    # Stage D NYHA Class III systolic heart failure secondary to ICM, listed 1A  # Severe mitral regurgitation  # Severe aortic insufficiency due to bicuspid Ao valve  # Non-sustained ventricular tachycardia (minimal)  - dobutamine 2.5 mcg/kg/min, dopamine 2.5 mcg/kg/min  Holding afterload meds for now   - hydralazine 25 mg q6 hours on non-dialysis days + 10 mg BID on dialysis days (after HD)   - isordil 10 mg TID on non-HD days  - weekly CXR for [place] position(SG placed 5/13)  - nephrology challenging dry weight       # Chronic Medical Issues:  - Seborrheic keratoses / Dermatofibromas / Multiple benign nevi / Nummular dermatitis: derm consulted, all lesions benign  - Hx of allergic rhinitis: cetrizine  - ESRD: Dialysis T, Th, Sa   - Non-obstructive CAD, Anomalous RCA: ASA 81 mg, atorvastatin 40 mg daily  - Ascending aortic aneurysm: 4.5 x 4.5 cm proximal ascending aortic aneurysm seen on MRI 2/14  - Multiple benign branch duct-IPMNs: no concerning features affecting transplant candidacy  - gout: allopurinol, prednisone 6/26 - 5/31 (tapered for acute flare)      FEN: regular diet, 2L FR  PROPHY: ambulation  LINES: [place] cath  DISPO: TBD  CODE STATUS:  Full    Case discussed with Dr. Blum.    Tunde Coffey  Cardiology Fellow      Interval History   No acute events   afebrile BP MAP 75-80  Skipped hydral this morning due to low BP  On 2.5 dobutamine and 2.5  "dopamine   Feeling the same    Physical Exam   Temp: 97.9  F (36.6  C) Temp src: Oral BP: (S) (!) 88/64 (lying in bed)   Heart Rate: 102 Resp: 24 SpO2: 97 % O2 Device: None (Room air)    Vitals:    06/09/18 0945 06/09/18 1920 06/10/18 0700   Weight: 77.7 kg (171 lb 4.8 oz) 76.6 kg (168 lb 14 oz) 76.8 kg (169 lb 5 oz)     Wt Readings from Last 2 Encounters:   06/10/18 76.8 kg (169 lb 5 oz)   04/16/18 76.9 kg (169 lb 9.6 oz)       Vital Signs with Ranges  Temp:  [97.6  F (36.4  C)-98.4  F (36.9  C)] 97.9  F (36.6  C)  Heart Rate:  [101-114] 102  Resp:  [16-24] 24  BP: ()/(54-73) 88/64  SpO2:  [93 %-100 %] 97 %  I/O last 3 completed shifts:  In: 860.4 [P.O.:480; I.V.:380.4]  Out: 2200 [Other:2200]    SVR 3.5 6/9    Heart Rate: 102, Blood pressure (S) (!) 88/64, pulse 107, temperature 97.9  F (36.6  C), temperature source Oral, resp. rate 24, height 1.727 m (5' 8\"), weight 76.8 kg (169 lb 5 oz), SpO2 97 %.  169 lbs 5.01 oz  GEN:  Alert, oriented x 3, appears comfortable, NAD.  CV:  Regular rate and rhythm, no murmur no JVD  LUNGS:  Clear to auscultation bilaterally   ABD:  Active bowel sounds, soft, non-tender/non-distended.  No rebound/guarding/rigidity.  EXT:  No edema or cyanosis.      Medications     DOBUTamine 2.5 mcg/kg/min (06/10/18 0600)     DOPamine 2.5 mcg/kg/min (06/10/18 0854)     - MEDICATION INSTRUCTIONS -       Reason ACE/ARB/ARNI order not selected       Reason beta blocker order not selected         albuterol  2.5 mg Nebulization At Bedtime     allopurinol  100 mg Oral Daily     aspirin  81 mg Oral Daily     atorvastatin  40 mg Oral Daily     cetirizine  10 mg Oral Daily     cyanocolbalamin  100 mcg Oral Daily     fluticasone  1-2 spray Both Nostrils Daily     hydrALAZINE  25 mg Oral 4 times per day on Sun Mon Wed Fri    And     hydrALAZINE  10 mg Oral 2 times per day on Tue Thu Sat     insulin aspart   Subcutaneous QAM AC     insulin aspart   Subcutaneous Daily with lunch     insulin aspart   " Subcutaneous Daily with supper     insulin aspart  1-10 Units Subcutaneous TID AC     insulin aspart  1-7 Units Subcutaneous At Bedtime     insulin glargine  10 Units Subcutaneous QAM AC     NEPHROCAPS  1 capsule Oral Daily     omeprazole  20 mg Oral Daily     sodium chloride (PF)  10 mL Intracatheter Q7 Days     sodium chloride (PF)  3 mL Intracatheter Q8H     vitamin  B-1  100 mg Oral Daily       Data     Recent Labs  Lab 06/09/18  0614 06/07/18  0610 06/06/18  0622   WBC 5.6 6.3 5.5   HGB 9.2* 8.8* 9.2*   MCV 91 90 94    242 247    137 138   POTASSIUM 4.5 4.7 4.5   CHLORIDE 98 102 102   CO2 25 23 25   BUN 44* 50* 35*   CR 7.17* 8.40* 6.44*   ANIONGAP 11 12 11   TRINI 8.5 8.9 9.1   * 93 85       No results found for this or any previous visit (from the past 24 hour(s)).      I/O last 3 completed shifts:  In: 860.4 [P.O.:480; I.V.:380.4]  Out: 2200 [Other:2200]      LINES/TUBES:  Data      PICC Double Lumen 04/22/18 Left Basilic (Active)   Site Assessment WDL 6/10/2018  4:00 AM   External Cath Length (cm) 4 cm 5/28/2018 12:00 AM   Extremity Circumference (cm) 34 cm 5/4/2018  2:00 PM   Dressing Intervention Chlorhexidine patch;Transparent 6/10/2018  4:00 AM   Extravasation? No 6/10/2018  4:00 AM   Dressing Change Due 06/10/18 6/9/2018  4:00 PM   PICC Comment cdi 6/9/2018  8:00 AM   PICC Lumen Assessment Trigger Magana;Purple 6/5/2018 12:00 PM   Number of days:49       CVC Single Lumen 05/13/18 Left Internal jugular (Active)   Site Assessment WDL 6/10/2018  4:00 AM   Line Status Saline locked 6/10/2018  4:00 AM   Dressing Intervention Chlorhexidine sponge;Transparent 6/10/2018  4:00 AM   Extravasation? No 6/10/2018  4:00 AM   Dressing Change Due 06/10/18 6/9/2018  4:00 PM   CVC Comment swan 6/7/2018  8:00 AM   Number of days:28       CVC Double Lumen 04/17/18 Right Internal jugular (Active)   Site Assessment WDL 6/10/2018  4:00 AM   Lumen Soln/Vol REFERENCE 2.1A/2.1V 6/9/2018  7:20 PM   External Cath  Length (cm) 3.5 cm 5/26/2018  2:25 PM   Extravasation? No 6/10/2018  4:00 AM   Dressing Intervention Chlorhexidine sponge;Transparent 6/10/2018  4:00 AM   Dressing Change Due 06/16/18 6/9/2018  7:20 PM   CVC Comment CDI, Biopatch used  6/9/2018  7:20 PM   CVC Lumen Assessment Red;Blue 6/9/2018  7:20 PM   Number of days:54       Pulmonary Artery Catheter - Single Lumen 05/13/18 1700 Left internal jugular vein (Active)   Dressing dressing dry and intact 6/10/2018  4:00 AM   Securement secured with sutures 6/10/2018  4:00 AM   PA Catheter Markings (cm) 63 6/10/2018  4:00 AM   Phlebitis 0-->no symptoms 6/10/2018  4:00 AM   Infiltration 0-->no symptoms 6/10/2018  4:00 AM   Waveform normal 6/10/2018  4:00 AM   Pressure Catheter Interventions line leveled/zeroed 6/10/2018  4:00 AM   Daily Review Of Necessity completed 6/9/2018  9:15 PM   Number of days:28

## 2018-06-10 NOTE — PROGRESS NOTES
HEMODIALYSIS TREATMENT NOTE    Date: 6/9/2018  Time: 7:47 PM    Data:  Pre Wt: 77.7 kg (171 lb 4.8 oz)   Desired Wt: 75.5 kg   Post Wt: 76.6 kg (168 lb 14 oz)  Weight gain: -1.1 kg   Weight change: 1.1 kg  Ultrafiltration - Post Run Net Total Removed (mL): 2200 mL  Ultrafiltration - Post Run Net Total Gain (mL): 0 mL  Vascular Access Status: Patent; Heparin instilled   Dialyzer Rinse: Clear  Total Blood Volume Processed: 75.9 liters  Total Dialysis (Treatment) Time:  3.5 hours    Lab:   No    Interventions:  Net UF removal goal set for 2.2L per order,   HD lines reversed 2/2 collapsing during treatment,  CVC (HD access) dressing changed during HD.     Assessment:  3.5hr of dialysis well tolerated by Pt as prescribed,   Vitals steady, denied any dialysis r/t discomfort,   No noted s/s of infection around access site,   Pt A/O x4,   Hand off report given to bedside RN.      Plan:    Next HD per renal team.

## 2018-06-11 ENCOUNTER — APPOINTMENT (OUTPATIENT)
Dept: GENERAL RADIOLOGY | Facility: CLINIC | Age: 63
DRG: 001 | End: 2018-06-11
Attending: INTERNAL MEDICINE
Payer: COMMERCIAL

## 2018-06-11 ENCOUNTER — APPOINTMENT (OUTPATIENT)
Dept: OCCUPATIONAL THERAPY | Facility: CLINIC | Age: 63
DRG: 001 | End: 2018-06-11
Attending: INTERNAL MEDICINE
Payer: COMMERCIAL

## 2018-06-11 ENCOUNTER — APPOINTMENT (OUTPATIENT)
Dept: GENERAL RADIOLOGY | Facility: CLINIC | Age: 63
DRG: 001 | End: 2018-06-11
Attending: STUDENT IN AN ORGANIZED HEALTH CARE EDUCATION/TRAINING PROGRAM
Payer: COMMERCIAL

## 2018-06-11 LAB
ANION GAP SERPL CALCULATED.3IONS-SCNC: 13 MMOL/L (ref 3–14)
BASE EXCESS BLDV CALC-SCNC: 0.6 MMOL/L
BASE EXCESS BLDV CALC-SCNC: 1.2 MMOL/L
BASE EXCESS BLDV CALC-SCNC: 2 MMOL/L
BUN SERPL-MCNC: 40 MG/DL (ref 7–30)
CALCIUM SERPL-MCNC: 8.7 MG/DL (ref 8.5–10.1)
CHLORIDE SERPL-SCNC: 94 MMOL/L (ref 94–109)
CO2 SERPL-SCNC: 24 MMOL/L (ref 20–32)
CREAT SERPL-MCNC: 6.79 MG/DL (ref 0.66–1.25)
GFR SERPL CREATININE-BSD FRML MDRD: 8 ML/MIN/1.7M2
GLUCOSE BLDC GLUCOMTR-MCNC: 103 MG/DL (ref 70–99)
GLUCOSE BLDC GLUCOMTR-MCNC: 111 MG/DL (ref 70–99)
GLUCOSE BLDC GLUCOMTR-MCNC: 156 MG/DL (ref 70–99)
GLUCOSE BLDC GLUCOMTR-MCNC: 78 MG/DL (ref 70–99)
GLUCOSE SERPL-MCNC: 98 MG/DL (ref 70–99)
HCO3 BLDV-SCNC: 25 MMOL/L (ref 21–28)
HCO3 BLDV-SCNC: 26 MMOL/L (ref 21–28)
HCO3 BLDV-SCNC: 27 MMOL/L (ref 21–28)
MAGNESIUM SERPL-MCNC: 2.6 MG/DL (ref 1.6–2.3)
O2/TOTAL GAS SETTING VFR VENT: 21 %
OXYHGB MFR BLDV: 45 %
OXYHGB MFR BLDV: 49 %
OXYHGB MFR BLDV: 49 %
PCO2 BLDV: 40 MM HG (ref 40–50)
PCO2 BLDV: 40 MM HG (ref 40–50)
PCO2 BLDV: 41 MM HG (ref 40–50)
PH BLDV: 7.41 PH (ref 7.32–7.43)
PH BLDV: 7.42 PH (ref 7.32–7.43)
PH BLDV: 7.43 PH (ref 7.32–7.43)
PO2 BLDV: 28 MM HG (ref 25–47)
PO2 BLDV: 30 MM HG (ref 25–47)
PO2 BLDV: 30 MM HG (ref 25–47)
POTASSIUM SERPL-SCNC: 4.5 MMOL/L (ref 3.4–5.3)
RADIOLOGIST FLAGS: ABNORMAL
SODIUM SERPL-SCNC: 131 MMOL/L (ref 133–144)

## 2018-06-11 PROCEDURE — 25000125 ZZHC RX 250: Performed by: STUDENT IN AN ORGANIZED HEALTH CARE EDUCATION/TRAINING PROGRAM

## 2018-06-11 PROCEDURE — A9270 NON-COVERED ITEM OR SERVICE: HCPCS | Mod: GY | Performed by: STUDENT IN AN ORGANIZED HEALTH CARE EDUCATION/TRAINING PROGRAM

## 2018-06-11 PROCEDURE — 99232 SBSQ HOSP IP/OBS MODERATE 35: CPT | Mod: GC | Performed by: INTERNAL MEDICINE

## 2018-06-11 PROCEDURE — 97110 THERAPEUTIC EXERCISES: CPT | Mod: GO

## 2018-06-11 PROCEDURE — 80048 BASIC METABOLIC PNL TOTAL CA: CPT | Performed by: INTERNAL MEDICINE

## 2018-06-11 PROCEDURE — 25000132 ZZH RX MED GY IP 250 OP 250 PS 637: Performed by: NURSE PRACTITIONER

## 2018-06-11 PROCEDURE — 25000132 ZZH RX MED GY IP 250 OP 250 PS 637: Mod: GY | Performed by: INTERNAL MEDICINE

## 2018-06-11 PROCEDURE — A9270 NON-COVERED ITEM OR SERVICE: HCPCS | Mod: GY | Performed by: NURSE PRACTITIONER

## 2018-06-11 PROCEDURE — 71045 X-RAY EXAM CHEST 1 VIEW: CPT | Mod: 77

## 2018-06-11 PROCEDURE — 83735 ASSAY OF MAGNESIUM: CPT | Performed by: INTERNAL MEDICINE

## 2018-06-11 PROCEDURE — 40000048 ZZH STATISTIC DAILY SWAN MONITORING

## 2018-06-11 PROCEDURE — 00000146 ZZHCL STATISTIC GLUCOSE BY METER IP

## 2018-06-11 PROCEDURE — 25000132 ZZH RX MED GY IP 250 OP 250 PS 637: Mod: GY | Performed by: NURSE PRACTITIONER

## 2018-06-11 PROCEDURE — A9270 NON-COVERED ITEM OR SERVICE: HCPCS | Mod: GY | Performed by: INTERNAL MEDICINE

## 2018-06-11 PROCEDURE — 20000004 ZZH R&B ICU UMMC

## 2018-06-11 PROCEDURE — 71045 X-RAY EXAM CHEST 1 VIEW: CPT

## 2018-06-11 PROCEDURE — 40000196 ZZH STATISTIC RAPCV CVP MONITORING

## 2018-06-11 PROCEDURE — 40000133 ZZH STATISTIC OT WARD VISIT

## 2018-06-11 PROCEDURE — 25000128 H RX IP 250 OP 636: Performed by: STUDENT IN AN ORGANIZED HEALTH CARE EDUCATION/TRAINING PROGRAM

## 2018-06-11 PROCEDURE — 82805 BLOOD GASES W/O2 SATURATION: CPT | Performed by: INTERNAL MEDICINE

## 2018-06-11 PROCEDURE — 25000132 ZZH RX MED GY IP 250 OP 250 PS 637: Mod: GY | Performed by: STUDENT IN AN ORGANIZED HEALTH CARE EDUCATION/TRAINING PROGRAM

## 2018-06-11 PROCEDURE — 40000275 ZZH STATISTIC RCP TIME EA 10 MIN

## 2018-06-11 PROCEDURE — 94640 AIRWAY INHALATION TREATMENT: CPT

## 2018-06-11 PROCEDURE — 40000014 ZZH STATISTIC ARTERIAL MONITORING DAILY

## 2018-06-11 RX ORDER — HYDRALAZINE HYDROCHLORIDE 10 MG/1
10 TABLET, FILM COATED ORAL
Status: DISCONTINUED | OUTPATIENT
Start: 2018-06-12 | End: 2018-06-14

## 2018-06-11 RX ORDER — HYDRALAZINE HYDROCHLORIDE 10 MG/1
10 TABLET, FILM COATED ORAL
Status: DISCONTINUED | OUTPATIENT
Start: 2018-06-11 | End: 2018-06-14

## 2018-06-11 RX ADMIN — DOPAMINE HYDROCHLORIDE IN DEXTROSE 2.5 MCG/KG/MIN: 1.6 INJECTION, SOLUTION INTRAVENOUS at 01:14

## 2018-06-11 RX ADMIN — HYDRALAZINE HYDROCHLORIDE 10 MG: 10 TABLET, FILM COATED ORAL at 23:37

## 2018-06-11 RX ADMIN — ALLOPURINOL 100 MG: 100 TABLET ORAL at 08:39

## 2018-06-11 RX ADMIN — VITAMIN B12 0.1 MG ORAL TABLET 100 MCG: 0.1 TABLET ORAL at 08:40

## 2018-06-11 RX ADMIN — ALBUTEROL SULFATE 2.5 MG: 2.5 SOLUTION RESPIRATORY (INHALATION) at 22:21

## 2018-06-11 RX ADMIN — HYDRALAZINE HYDROCHLORIDE 10 MG: 10 TABLET, FILM COATED ORAL at 19:35

## 2018-06-11 RX ADMIN — Medication 1 CAPSULE: at 08:43

## 2018-06-11 RX ADMIN — ATORVASTATIN CALCIUM 40 MG: 40 TABLET, FILM COATED ORAL at 19:35

## 2018-06-11 RX ADMIN — Medication 100 MG: at 08:43

## 2018-06-11 RX ADMIN — HYDRALAZINE HYDROCHLORIDE 10 MG: 10 TABLET, FILM COATED ORAL at 13:23

## 2018-06-11 RX ADMIN — INSULIN GLARGINE 10 UNITS: 100 INJECTION, SOLUTION SUBCUTANEOUS at 08:49

## 2018-06-11 RX ADMIN — OMEPRAZOLE 20 MG: 20 CAPSULE, DELAYED RELEASE ORAL at 19:36

## 2018-06-11 RX ADMIN — CETIRIZINE HYDROCHLORIDE 10 MG: 10 TABLET, FILM COATED ORAL at 08:40

## 2018-06-11 RX ADMIN — FLUTICASONE PROPIONATE 2 SPRAY: 50 SPRAY, METERED NASAL at 19:37

## 2018-06-11 RX ADMIN — INSULIN ASPART 6 UNITS: 100 INJECTION, SOLUTION INTRAVENOUS; SUBCUTANEOUS at 14:28

## 2018-06-11 RX ADMIN — SENNOSIDES AND DOCUSATE SODIUM 1 TABLET: 8.6; 5 TABLET ORAL at 19:36

## 2018-06-11 RX ADMIN — ASPIRIN 81 MG CHEWABLE TABLET 81 MG: 81 TABLET CHEWABLE at 19:34

## 2018-06-11 NOTE — PLAN OF CARE
Problem: Patient Care Overview  Goal: Plan of Care/Patient Progress Review  Discharge Planner OT   Patient plan for discharge: did not discuss due to medical status  Current status: Pt ambulated in hallways ~1800 feet with no AD and SBA.  Nursing present as pt has Jarad.  Pt completed 4 LE standing exercises: high steps for 1 min, sidekicks, backward kicks and knee lifts 10 reps on each side x2.  Pt tolerated well, VSS throughout.  Pt BP 96/57, , SpO2 >90% throughout.  Barriers to return to prior living situation: medical status  Recommendations for discharge: defer pending medical course  Rationale for recommendations: Pt status 1A for heart and kidney transplant.       Entered by: Shanna Vogt 06/11/2018 1:29 PM

## 2018-06-11 NOTE — PLAN OF CARE
Problem: Patient Care Overview  Goal: Plan of Care/Patient Progress Review  Outcome: No Change  D: Afebrile. Sinus tachycardia, 110s. Normotensive. A&Ox4. On RA.  I/A: Denies shortness of breath. No pain. Hemodynamics done at 0600, SVO2 49, CVP 10, PA 50/30, CO 3.9, CI 2, SVR 1328, . Continues on dobutamine and dopamine gtts at 2.5. Slept well throughout the night.   P: Continue to monitor and assess pt. Consult Cards 2 team with any new changes/concerns.

## 2018-06-11 NOTE — PROGRESS NOTES
SPIRITUAL HEALTH SERVICES  SPIRITUAL ASSESSMENT Progress Note  [facility] ([place]) 4E     Visited with pt while he was with PT and bedside nurse, walking in hallways. Engaged in conversation about theater, music, and some in-depth conversation about spiritual issues related to pt's hospitalization and hope for transplant. Pt very engaging, social, and interactive.    PLAN: continue to follow, visiting pt at least weekly while pt on unit.    Navid Alonso M.Div (Bill)., [facility]  Staff   Pager [phone]

## 2018-06-11 NOTE — PROGRESS NOTES
Cardiology Progress Note 06/11/2018    Assessment & Plan   63 year old male with a PMH of CAD, ICM (EF of 15-20%), ESRD, bicuspid aortic valve with AI, severe MR, and proximal AAA who was admitted for decompensated heart failure. He is on dobutamine 2.5 mcg/kg/min and listed for heart/kidney transplant as status 1A.    Major Changes:  - none, continue dobutamine and dopamine at current doses  - will be cautious about low BP in case early sign of sepsis  - lower the dose of hydralazine, holding isodil  - PVR 3.5 6/9  - recheck SG position, unable to obtain PCW  - SG sheath site without redness. No sign of infection. Will closely monitor it.    # Stage D NYHA Class III systolic heart failure secondary to ICM, listed 1A  # Severe mitral regurgitation  # Severe aortic insufficiency due to bicuspid Ao valve  # Non-sustained ventricular tachycardia (minimal)  - dobutamine 2.5 mcg/kg/min, dopamine 2.5 mcg/kg/min  Holding afterload meds for now   - hydralazine 25 mg q6 hours on non-dialysis days + 10 mg BID on dialysis days (after HD)   - isordil 10 mg TID on non-HD days  - weekly CXR for [place] position(SG placed 5/13)  - nephrology challenging dry weight       # Chronic Medical Issues:  - Seborrheic keratoses / Dermatofibromas / Multiple benign nevi / Nummular dermatitis: derm consulted, all lesions benign  - Hx of allergic rhinitis: cetrizine  - ESRD: Dialysis T, Th, Sa   - Non-obstructive CAD, Anomalous RCA: ASA 81 mg, atorvastatin 40 mg daily  - Ascending aortic aneurysm: 4.5 x 4.5 cm proximal ascending aortic aneurysm seen on MRI 2/14  - Multiple benign branch duct-IPMNs: no concerning features affecting transplant candidacy  - gout: allopurinol, prednisone 6/26 - 5/31 (tapered for acute flare)      FEN: regular diet, 2L FR  PROPHY: ambulation  LINES: [place] cath  DISPO: TBD  CODE STATUS:  Full    Case discussed with Dr. Blum.    Tunde Coffey  Cardiology Fellow      Interval History   No acute events   afebrile BP  "MAP 75-80  Skipped hydral this morning due to low BP  On 2.5 dobutamine and 2.5 dopamine   Pain at the sit of SWAN from yesterday.      Physical Exam   Temp: 97.7  F (36.5  C) Temp src: Oral BP: 101/68   Heart Rate: 109 Resp: 16 SpO2: 95 % O2 Device: None (Room air)    Vitals:    06/09/18 1920 06/10/18 0700 06/11/18 0900   Weight: 76.6 kg (168 lb 14 oz) 76.8 kg (169 lb 5 oz) 77.7 kg (171 lb 6.5 oz)     Wt Readings from Last 2 Encounters:   06/11/18 77.7 kg (171 lb 6.5 oz)   04/16/18 76.9 kg (169 lb 9.6 oz)       Vital Signs with Ranges  Temp:  [97.7  F (36.5  C)-98.4  F (36.9  C)] 97.7  F (36.5  C)  Heart Rate:  [101-109] 109  Resp:  [16-18] 16  BP: ()/(60-72) 101/68  SpO2:  [95 %-100 %] 95 %  I/O last 3 completed shifts:  In: 1226.4 [P.O.:840; I.V.:386.4]  Out: -     SVR 3.5 6/9    Heart Rate: 109, Blood pressure 101/68, pulse 107, temperature 97.7  F (36.5  C), temperature source Oral, resp. rate 16, height 1.727 m (5' 8\"), weight 77.7 kg (171 lb 6.5 oz), SpO2 95 %.  171 lbs 6.52 oz  GEN:  Alert, oriented x 3, appears comfortable, NAD.  CV:  Regular rate and rhythm, no murmur no JVD  LUNGS:  Clear to auscultation bilaterally   ABD:  Active bowel sounds, soft, non-tender/non-distended.  No rebound/guarding/rigidity.  EXT:  No edema or cyanosis.    SKIN SG sheath site without redness. No sign of infection    Medications     DOBUTamine 2.5 mcg/kg/min (06/11/18 0900)     DOPamine 2.5 mcg/kg/min (06/11/18 0900)     - MEDICATION INSTRUCTIONS -       Reason ACE/ARB/ARNI order not selected       Reason beta blocker order not selected         albuterol  2.5 mg Nebulization At Bedtime     allopurinol  100 mg Oral Daily     aspirin  81 mg Oral Daily     atorvastatin  40 mg Oral Daily     cetirizine  10 mg Oral Daily     cyanocolbalamin  100 mcg Oral Daily     fluticasone  1-2 spray Both Nostrils Daily     hydrALAZINE  10 mg Oral 4 times per day on Sun Mon Wed Fri    And     [START ON 6/12/2018] hydrALAZINE  10 mg Oral " 2 times per day on Tue Thu Sat     insulin aspart   Subcutaneous QAM AC     insulin aspart   Subcutaneous Daily with lunch     insulin aspart   Subcutaneous Daily with supper     insulin aspart  1-10 Units Subcutaneous TID AC     insulin aspart  1-7 Units Subcutaneous At Bedtime     insulin glargine  10 Units Subcutaneous QAM AC     NEPHROCAPS  1 capsule Oral Daily     omeprazole  20 mg Oral Daily     sodium chloride (PF)  10 mL Intracatheter Q7 Days     sodium chloride (PF)  3 mL Intracatheter Q8H     vitamin  B-1  100 mg Oral Daily       Data     Recent Labs  Lab 06/11/18  0607 06/10/18  2144 06/09/18  0614 06/07/18  0610   WBC  --  5.0 5.6 6.3   HGB  --  9.8* 9.2* 8.8*   MCV  --  91 91 90   PLT  --  244 229 242   * 128* 134 137   POTASSIUM 4.5 4.9 4.5 4.7   CHLORIDE 94 92* 98 102   CO2 24 25 25 23   BUN 40* 35* 44* 50*   CR 6.79* 6.13* 7.17* 8.40*   ANIONGAP 13 10 11 12   TRINI 8.7 8.7 8.5 8.9   GLC 98 181* 100* 93   ALBUMIN  --  2.7*  --   --    PROTTOTAL  --  6.8  --   --    BILITOTAL  --  0.7  --   --    ALKPHOS  --  121  --   --    ALT  --  16  --   --    AST  --  16  --   --        No results found for this or any previous visit (from the past 24 hour(s)).      I/O last 3 completed shifts:  In: 1226.4 [P.O.:840; I.V.:386.4]  Out: -       LINES/TUBES:  Data      PICC Double Lumen 04/22/18 Left Basilic (Active)   Site Assessment WDL 6/11/2018  8:00 AM   External Cath Length (cm) 4 cm 5/28/2018 12:00 AM   Extremity Circumference (cm) 34 cm 5/4/2018  2:00 PM   Dressing Intervention Chlorhexidine patch;Transparent 6/11/2018  8:00 AM   Extravasation? No 6/11/2018  8:00 AM   Dressing Change Due 06/17/18 6/11/2018  8:00 AM   PICC Comment CDI 6/10/2018  4:00 PM   PICC Lumen Assessment Trigger Magana;Purple 6/5/2018 12:00 PM   Number of days:50       CVC Single Lumen 05/13/18 Left Internal jugular (Active)   Site Assessment WDL 6/11/2018  8:00 AM   Line Status [place]ine locked 6/11/2018  8:00 AM   Dressing Intervention  New dressing;Chlorhexidine sponge;Transparent 6/11/2018  8:00 AM   Extravasation? No 6/11/2018  8:00 AM   Dressing Change Due 06/17/18 6/11/2018  8:00 AM   CVC Comment rogerio 6/7/2018  8:00 AM   Number of days:29       CVC Double Lumen 04/17/18 Right Internal jugular (Active)   Site Assessment WDL 6/11/2018  8:00 AM   Lumen Soln/Vol REFERENCE 2.1A/2.1V 6/9/2018  7:20 PM   External Cath Length (cm) 3.5 cm 5/26/2018  2:25 PM   Extravasation? No 6/11/2018  8:00 AM   Dressing Intervention Chlorhexidine sponge;Transparent 6/11/2018  8:00 AM   Dressing Change Due 06/16/18 6/11/2018  8:00 AM   CVC Comment CDI, Biopatch used  6/9/2018  7:20 PM   CVC Lumen Assessment Blue;Red 6/10/2018  4:00 PM   Number of days:55       Pulmonary Artery Catheter - Single Lumen 05/13/18 1700 Left internal jugular vein (Active)   Dressing dressing dry and intact 6/11/2018  8:00 AM   Securement secured with sutures 6/11/2018  8:00 AM   PA Catheter Markings (cm) 63 6/11/2018  8:00 AM   Phlebitis 0-->no symptoms 6/11/2018  8:00 AM   Infiltration 0-->no symptoms 6/11/2018  8:00 AM   Waveform normal 6/11/2018  8:00 AM   Pressure Catheter Interventions line leveled/zeroed 6/11/2018  8:00 AM   Daily Review Of Necessity completed 6/11/2018  8:00 AM   Number of days:29

## 2018-06-12 ENCOUNTER — APPOINTMENT (OUTPATIENT)
Dept: GENERAL RADIOLOGY | Facility: CLINIC | Age: 63
DRG: 001 | End: 2018-06-12
Attending: INTERNAL MEDICINE
Payer: COMMERCIAL

## 2018-06-12 LAB
ANION GAP SERPL CALCULATED.3IONS-SCNC: 12 MMOL/L (ref 3–14)
BASE DEFICIT BLDV-SCNC: 0.6 MMOL/L
BASE EXCESS BLDV CALC-SCNC: 7.2 MMOL/L
BUN SERPL-MCNC: 53 MG/DL (ref 7–30)
CALCIUM SERPL-MCNC: 9.1 MG/DL (ref 8.5–10.1)
CHLORIDE SERPL-SCNC: 94 MMOL/L (ref 94–109)
CO2 SERPL-SCNC: 23 MMOL/L (ref 20–32)
CREAT SERPL-MCNC: 8.51 MG/DL (ref 0.66–1.25)
GFR SERPL CREATININE-BSD FRML MDRD: 6 ML/MIN/1.7M2
GLUCOSE BLDC GLUCOMTR-MCNC: 127 MG/DL (ref 70–99)
GLUCOSE BLDC GLUCOMTR-MCNC: 143 MG/DL (ref 70–99)
GLUCOSE BLDC GLUCOMTR-MCNC: 149 MG/DL (ref 70–99)
GLUCOSE BLDC GLUCOMTR-MCNC: 150 MG/DL (ref 70–99)
GLUCOSE BLDC GLUCOMTR-MCNC: 207 MG/DL (ref 70–99)
GLUCOSE BLDC GLUCOMTR-MCNC: 96 MG/DL (ref 70–99)
GLUCOSE SERPL-MCNC: 106 MG/DL (ref 70–99)
HCO3 BLDV-SCNC: 24 MMOL/L (ref 21–28)
HCO3 BLDV-SCNC: 31 MMOL/L (ref 21–28)
MAGNESIUM SERPL-MCNC: 2.6 MG/DL (ref 1.6–2.3)
O2/TOTAL GAS SETTING VFR VENT: 21 %
O2/TOTAL GAS SETTING VFR VENT: 21 %
OXYHGB MFR BLDV: 44 %
OXYHGB MFR BLDV: 52 %
PCO2 BLDV: 40 MM HG (ref 40–50)
PCO2 BLDV: 43 MM HG (ref 40–50)
PH BLDV: 7.39 PH (ref 7.32–7.43)
PH BLDV: 7.48 PH (ref 7.32–7.43)
PO2 BLDV: 28 MM HG (ref 25–47)
PO2 BLDV: 30 MM HG (ref 25–47)
POTASSIUM SERPL-SCNC: 5 MMOL/L (ref 3.4–5.3)
SODIUM SERPL-SCNC: 129 MMOL/L (ref 133–144)

## 2018-06-12 PROCEDURE — 86900 BLOOD TYPING SEROLOGIC ABO: CPT | Performed by: STUDENT IN AN ORGANIZED HEALTH CARE EDUCATION/TRAINING PROGRAM

## 2018-06-12 PROCEDURE — 86850 RBC ANTIBODY SCREEN: CPT | Performed by: STUDENT IN AN ORGANIZED HEALTH CARE EDUCATION/TRAINING PROGRAM

## 2018-06-12 PROCEDURE — A9270 NON-COVERED ITEM OR SERVICE: HCPCS | Mod: GY | Performed by: STUDENT IN AN ORGANIZED HEALTH CARE EDUCATION/TRAINING PROGRAM

## 2018-06-12 PROCEDURE — A9270 NON-COVERED ITEM OR SERVICE: HCPCS | Mod: GY | Performed by: INTERNAL MEDICINE

## 2018-06-12 PROCEDURE — 25000132 ZZH RX MED GY IP 250 OP 250 PS 637: Mod: GY | Performed by: STUDENT IN AN ORGANIZED HEALTH CARE EDUCATION/TRAINING PROGRAM

## 2018-06-12 PROCEDURE — 94640 AIRWAY INHALATION TREATMENT: CPT

## 2018-06-12 PROCEDURE — 86923 COMPATIBILITY TEST ELECTRIC: CPT | Performed by: STUDENT IN AN ORGANIZED HEALTH CARE EDUCATION/TRAINING PROGRAM

## 2018-06-12 PROCEDURE — 25000132 ZZH RX MED GY IP 250 OP 250 PS 637: Mod: GY | Performed by: NURSE PRACTITIONER

## 2018-06-12 PROCEDURE — 40000275 ZZH STATISTIC RCP TIME EA 10 MIN

## 2018-06-12 PROCEDURE — 25000125 ZZHC RX 250: Performed by: STUDENT IN AN ORGANIZED HEALTH CARE EDUCATION/TRAINING PROGRAM

## 2018-06-12 PROCEDURE — 25000128 H RX IP 250 OP 636: Performed by: INTERNAL MEDICINE

## 2018-06-12 PROCEDURE — 63400005 ZZH RX 634: Performed by: INTERNAL MEDICINE

## 2018-06-12 PROCEDURE — 40000048 ZZH STATISTIC DAILY SWAN MONITORING

## 2018-06-12 PROCEDURE — 25000132 ZZH RX MED GY IP 250 OP 250 PS 637: Performed by: INTERNAL MEDICINE

## 2018-06-12 PROCEDURE — 40000196 ZZH STATISTIC RAPCV CVP MONITORING

## 2018-06-12 PROCEDURE — 82805 BLOOD GASES W/O2 SATURATION: CPT | Performed by: INTERNAL MEDICINE

## 2018-06-12 PROCEDURE — 80048 BASIC METABOLIC PNL TOTAL CA: CPT | Performed by: NURSE PRACTITIONER

## 2018-06-12 PROCEDURE — A9270 NON-COVERED ITEM OR SERVICE: HCPCS | Mod: GY | Performed by: NURSE PRACTITIONER

## 2018-06-12 PROCEDURE — 86901 BLOOD TYPING SEROLOGIC RH(D): CPT | Performed by: STUDENT IN AN ORGANIZED HEALTH CARE EDUCATION/TRAINING PROGRAM

## 2018-06-12 PROCEDURE — 25000132 ZZH RX MED GY IP 250 OP 250 PS 637: Mod: GY | Performed by: INTERNAL MEDICINE

## 2018-06-12 PROCEDURE — 90937 HEMODIALYSIS REPEATED EVAL: CPT

## 2018-06-12 PROCEDURE — 25000128 H RX IP 250 OP 636: Performed by: STUDENT IN AN ORGANIZED HEALTH CARE EDUCATION/TRAINING PROGRAM

## 2018-06-12 PROCEDURE — 25000132 ZZH RX MED GY IP 250 OP 250 PS 637: Performed by: NURSE PRACTITIONER

## 2018-06-12 PROCEDURE — 99232 SBSQ HOSP IP/OBS MODERATE 35: CPT | Mod: GC | Performed by: INTERNAL MEDICINE

## 2018-06-12 PROCEDURE — 00000146 ZZHCL STATISTIC GLUCOSE BY METER IP

## 2018-06-12 PROCEDURE — 20000004 ZZH R&B ICU UMMC

## 2018-06-12 PROCEDURE — 76000 FLUOROSCOPY <1 HR PHYS/QHP: CPT

## 2018-06-12 PROCEDURE — 83735 ASSAY OF MAGNESIUM: CPT | Performed by: NURSE PRACTITIONER

## 2018-06-12 RX ORDER — HEPARIN SODIUM 1000 [USP'U]/ML
500 INJECTION, SOLUTION INTRAVENOUS; SUBCUTANEOUS
Status: COMPLETED | OUTPATIENT
Start: 2018-06-12 | End: 2018-06-12

## 2018-06-12 RX ORDER — HEPARIN SODIUM 1000 [USP'U]/ML
500 INJECTION, SOLUTION INTRAVENOUS; SUBCUTANEOUS CONTINUOUS
Status: DISCONTINUED | OUTPATIENT
Start: 2018-06-12 | End: 2018-06-12

## 2018-06-12 RX ADMIN — INSULIN GLARGINE 10 UNITS: 100 INJECTION, SOLUTION SUBCUTANEOUS at 08:46

## 2018-06-12 RX ADMIN — ASPIRIN 81 MG CHEWABLE TABLET 81 MG: 81 TABLET CHEWABLE at 19:40

## 2018-06-12 RX ADMIN — INSULIN ASPART 1 UNITS: 100 INJECTION, SOLUTION INTRAVENOUS; SUBCUTANEOUS at 16:13

## 2018-06-12 RX ADMIN — HEPARIN SODIUM 500 UNITS/HR: 1000 INJECTION, SOLUTION INTRAVENOUS; SUBCUTANEOUS at 12:09

## 2018-06-12 RX ADMIN — HEPARIN SODIUM 2200 UNITS: 1000 INJECTION, SOLUTION INTRAVENOUS; SUBCUTANEOUS at 14:35

## 2018-06-12 RX ADMIN — Medication 100 MG: at 08:42

## 2018-06-12 RX ADMIN — ALBUTEROL SULFATE 2.5 MG: 2.5 SOLUTION RESPIRATORY (INHALATION) at 21:29

## 2018-06-12 RX ADMIN — IRON SUCROSE 100 MG: 20 INJECTION, SOLUTION INTRAVENOUS at 12:57

## 2018-06-12 RX ADMIN — HEPARIN SODIUM 500 UNITS: 1000 INJECTION, SOLUTION INTRAVENOUS; SUBCUTANEOUS at 12:09

## 2018-06-12 RX ADMIN — Medication 1 CAPSULE: at 08:42

## 2018-06-12 RX ADMIN — CETIRIZINE HYDROCHLORIDE 10 MG: 10 TABLET, FILM COATED ORAL at 08:42

## 2018-06-12 RX ADMIN — ALLOPURINOL 100 MG: 100 TABLET ORAL at 08:42

## 2018-06-12 RX ADMIN — HYDRALAZINE HYDROCHLORIDE 10 MG: 10 TABLET, FILM COATED ORAL at 16:11

## 2018-06-12 RX ADMIN — MIDODRINE HYDROCHLORIDE 10 MG: 5 TABLET ORAL at 11:01

## 2018-06-12 RX ADMIN — INSULIN ASPART 5 UNITS: 100 INJECTION, SOLUTION INTRAVENOUS; SUBCUTANEOUS at 16:12

## 2018-06-12 RX ADMIN — VITAMIN B12 0.1 MG ORAL TABLET 100 MCG: 0.1 TABLET ORAL at 08:42

## 2018-06-12 RX ADMIN — HYDRALAZINE HYDROCHLORIDE 10 MG: 10 TABLET, FILM COATED ORAL at 19:40

## 2018-06-12 RX ADMIN — OMEPRAZOLE 20 MG: 20 CAPSULE, DELAYED RELEASE ORAL at 19:40

## 2018-06-12 RX ADMIN — EPOETIN ALFA 4000 UNITS: 10000 SOLUTION INTRAVENOUS; SUBCUTANEOUS at 12:53

## 2018-06-12 RX ADMIN — SODIUM CHLORIDE 300 ML: 9 INJECTION, SOLUTION INTRAVENOUS at 12:09

## 2018-06-12 RX ADMIN — FLUTICASONE PROPIONATE 2 SPRAY: 50 SPRAY, METERED NASAL at 19:40

## 2018-06-12 RX ADMIN — ATORVASTATIN CALCIUM 40 MG: 40 TABLET, FILM COATED ORAL at 19:40

## 2018-06-12 RX ADMIN — SODIUM CHLORIDE 250 ML: 9 INJECTION, SOLUTION INTRAVENOUS at 12:09

## 2018-06-12 RX ADMIN — HEPARIN SODIUM 2200 UNITS: 1000 INJECTION, SOLUTION INTRAVENOUS; SUBCUTANEOUS at 14:37

## 2018-06-12 RX ADMIN — DOBUTAMINE HYDROCHLORIDE 2.5 MCG/KG/MIN: 400 INJECTION INTRAVENOUS at 04:33

## 2018-06-12 RX ADMIN — DOPAMINE HYDROCHLORIDE IN DEXTROSE 2.5 MCG/KG/MIN: 1.6 INJECTION, SOLUTION INTRAVENOUS at 17:16

## 2018-06-12 NOTE — PLAN OF CARE
Problem: Patient Care Overview  Goal: Plan of Care/Patient Progress Review  Outcome: No Change  Patient ambulated hallway today and tolerated activity well.  Denies pain.  BECKA calculations to be performed at 06 and 1800 - able to flush PA cath but unable to obtain blood specimen. CXR obtained this morning.  Notified CARDS II team - they are to address line.  Hydralazine dose adjusted today.  Dialysis planned for tomorrow.

## 2018-06-12 NOTE — PLAN OF CARE
Problem: Cardiac: Heart Failure (Adult)  Goal: Signs and Symptoms of Listed Potential Problems Will be Absent, Minimized or Managed (Cardiac: Heart Failure)  Signs and symptoms of listed potential problems will be absent, minimized or managed by discharge/transition of care (reference Cardiac: Heart Failure (Adult) CPG).   Outcome: No Change   06/12/18 9127   Cardiac: Heart Failure   Problems Assessed (Heart Failure) all   Problems Present (Heart Failure) cardiac pump dysfunction;decreased quality of life;fluid/electrolyte imbalance;situational response     D/I/A:  Status 1A for heart, kidney. AOx4 in room. ST + rare PVCs, PACs (100's - 110's), SaO2 >95% RA. HUGO [place] repositioned at bedside under fluoro. CVP: 4, PA: 40/20, PAOP: 16. SvO2 52, CO 3.8, CI 2.0, SVR 1495. Dobutamine @ 2.5 m/k/m, dopamine @ 2.5 m/k/m. HD at bedside, removed 3L.    Plan:  Monitor HR/R, fluid status. Notify provider of changes.

## 2018-06-12 NOTE — PROGRESS NOTES
Cardiology Progress Note 06/12/2018    Assessment & Plan   63 year old male with a PMH of CAD, ICM (EF of 15-20%), ESRD, bicuspid aortic valve with AI, severe MR, and proximal AAA who was admitted for decompensated heart failure. He is on dobutamine 2.5 mcg/kg/min and listed for heart/kidney transplant as status 1A.    Major Changes:  - none, continue dobutamine and dopamine at current doses  - will be cautious about low BP in case early sign of sepsis  - lower the dose of hydralazine, holding isodil  - PVR 3.5 6/9  - SG position is now left PA, still unable to obtain PCW WILL REPOSITON Crossett with X ray    # Stage D NYHA Class III systolic heart failure secondary to ICM, listed 1A  # Severe mitral regurgitation  # Severe aortic insufficiency due to bicuspid Ao valve  # Non-sustained ventricular tachycardia (minimal)  - dobutamine 2.5 mcg/kg/min, dopamine 2.5 mcg/kg/min  Holding afterload meds for now   - hydralazine 25 mg q6 hours on non-dialysis days + 10 mg BID on dialysis days (after HD)   - isordil 10 mg TID on non-HD days  - weekly CXR for Crossett position(SG placed 5/13)  - nephrology challenging dry weight       # Chronic Medical Issues:  - Seborrheic keratoses / Dermatofibromas / Multiple benign nevi / Nummular dermatitis: derm consulted, all lesions benign  - Hx of allergic rhinitis: cetrizine  - ESRD: Dialysis T, Th, Sa   - Non-obstructive CAD, Anomalous RCA: ASA 81 mg, atorvastatin 40 mg daily  - Ascending aortic aneurysm: 4.5 x 4.5 cm proximal ascending aortic aneurysm seen on MRI 2/14  - Multiple benign branch duct-IPMNs: no concerning features affecting transplant candidacy  - gout: allopurinol, prednisone 6/26 - 5/31 (tapered for acute flare)      FEN: regular diet, 2L FR  PROPHY: ambulation  LINES: Crossett cath  DISPO: TBD  CODE STATUS:  Full    Case discussed with Dr. Blum.    Tunde Coffey  Cardiology Fellow      Interval History   No acute events   afebrile BP MAP 75-80  Skipped hydral this morning  "due to low BP  On 2.5 dobutamine and 2.5 dopamine   Pain at the sit of SWAN from yesterday.      Physical Exam   Temp: 97.9  F (36.6  C) Temp src: Oral BP: 93/62   Heart Rate: 105 Resp: 20 SpO2: 100 % O2 Device: None (Room air)    Vitals:    06/10/18 0700 06/11/18 0900 06/12/18 0500   Weight: 76.8 kg (169 lb 5 oz) 77.7 kg (171 lb 6.5 oz) 78.8 kg (173 lb 13.3 oz)     Wt Readings from Last 2 Encounters:   06/12/18 78.8 kg (173 lb 13.3 oz)   04/16/18 76.9 kg (169 lb 9.6 oz)       Vital Signs with Ranges  Temp:  [97.6  F (36.4  C)-97.9  F (36.6  C)] 97.9  F (36.6  C)  Heart Rate:  [103-110] 105  Resp:  [12-20] 20  BP: ()/(57-69) 93/62  SpO2:  [100 %] 100 %  I/O last 3 completed shifts:  In: 1136.4 [P.O.:750; I.V.:386.4]  Out: -     SVR 3.5 6/9    Heart Rate: 105, Blood pressure 93/62, pulse 107, temperature 97.9  F (36.6  C), temperature source Oral, resp. rate 20, height 1.727 m (5' 8\"), weight 78.8 kg (173 lb 13.3 oz), SpO2 100 %.  173 lbs 13.32 oz  GEN:  Alert, oriented x 3, appears comfortable, NAD.  CV:  Regular rate and rhythm, no murmur no JVD  LUNGS:  Clear to auscultation bilaterally   ABD:  Active bowel sounds, soft, non-tender/non-distended.  No rebound/guarding/rigidity.  EXT:  No edema or cyanosis.    SKIN SG sheath site without redness. No sign of infection    Medications     DOBUTamine 2.5 mcg/kg/min (06/12/18 0700)     DOPamine 2.5 mcg/kg/min (06/12/18 0700)     heparin (porcine)       - MEDICATION INSTRUCTIONS -       Reason ACE/ARB/ARNI order not selected       Reason beta blocker order not selected         sodium chloride 0.9%  250 mL Intravenous Once in dialysis     sodium chloride 0.9%  300 mL Hemodialysis Machine Once     albuterol  2.5 mg Nebulization At Bedtime     allopurinol  100 mg Oral Daily     aspirin  81 mg Oral Daily     atorvastatin  40 mg Oral Daily     cetirizine  10 mg Oral Daily     cyanocolbalamin  100 mcg Oral Daily     epoetin emily (EPOGEN,PROCRIT) inj ESRD  4,000 Units " Intravenous Once in dialysis     fluticasone  1-2 spray Both Nostrils Daily     gelatin absorbable  1 each Topical During Hemodialysis (from stock)     heparin (porcine)  500 Units Hemodialysis Machine Once in dialysis     heparin  3 mL Intracatheter During Hemodialysis (from stock)     heparin  3 mL Intracatheter During Hemodialysis (from stock)     hydrALAZINE  10 mg Oral 4 times per day on Sun Mon Wed Fri    And     hydrALAZINE  10 mg Oral 2 times per day on Tue Thu Sat     insulin aspart   Subcutaneous QAM AC     insulin aspart   Subcutaneous Daily with lunch     insulin aspart   Subcutaneous Daily with supper     insulin aspart  1-10 Units Subcutaneous TID AC     insulin aspart  1-7 Units Subcutaneous At Bedtime     insulin glargine  10 Units Subcutaneous QAM AC     iron sucrose  100 mg Intravenous Once in dialysis     NEPHROCAPS  1 capsule Oral Daily     omeprazole  20 mg Oral Daily     sodium chloride (PF)  10 mL Intracatheter Q7 Days     sodium chloride (PF)  3 mL Intracatheter During Hemodialysis (from stock)     sodium chloride (PF)  3 mL Intracatheter During Hemodialysis (from stock)     sodium chloride (PF)  3 mL Intracatheter Q8H     vitamin  B-1  100 mg Oral Daily       Data     Recent Labs  Lab 06/12/18  0435 06/11/18  0607 06/10/18  2144 06/09/18  0614 06/07/18  0610   WBC  --   --  5.0 5.6 6.3   HGB  --   --  9.8* 9.2* 8.8*   MCV  --   --  91 91 90   PLT  --   --  244 229 242   * 131* 128* 134 137   POTASSIUM 5.0 4.5 4.9 4.5 4.7   CHLORIDE 94 94 92* 98 102   CO2 23 24 25 25 23   BUN 53* 40* 35* 44* 50*   CR 8.51* 6.79* 6.13* 7.17* 8.40*   ANIONGAP 12 13 10 11 12   TRINI 9.1 8.7 8.7 8.5 8.9   * 98 181* 100* 93   ALBUMIN  --   --  2.7*  --   --    PROTTOTAL  --   --  6.8  --   --    BILITOTAL  --   --  0.7  --   --    ALKPHOS  --   --  121  --   --    ALT  --   --  16  --   --    AST  --   --  16  --   --        Recent Results (from the past 24 hour(s))   XR Chest Port 1 View    Narrative     Exam: XR CHEST PORT 1 VW, 6/11/2018 11:28 AM    Indication: position check for SG cath in PA;     Comparison: 6/6/2018    Findings:   AP view of the chest. Right IJ central venous catheter with the tip in  the high right atrium. Left IJ [place]-Jax catheter with the tip over  the main pulmonary artery. Left PICC tip projects over the mid SVC.  Cardiomediastinal silhouette is stable enlarged. Pulmonary vasculature  is distinct, with mild perihilar opacities. Mild bibasilar opacities.  No new focal airspace opacity. No pneumothorax or pleural effusion.  Upper abdomen is unremarkable.      Impression    Impression:   1. Left IJ Welcome-Jax catheter projects with the tip over the distal  main pulmonary artery. Remaining support devices are stable.  2. Mild perihilar atelectasis.    I have personally reviewed the examination and initial interpretation  and I agree with the findings.    CLARICE VILLARREAL MD   XR Chest Port 1 View   Result Value    Radiologist flags Welcome-Jax position (Urgent)    Narrative    Exam: XR CHEST PORT 1 VW, 6/11/2018 7:20 PM    Indication: eval swan position;     Comparison: 6/11/2018    Findings:   Single portable view the chest. There is interval advancement of the  left IJ Welcome-Jax catheter now terminating in the left pulmonary  artery. Left-sided PICC terminating in the mid SVC. Right-sided IJ  central venous catheter terminating in the high right atrium. The  cardiac silhouette is enlarged but stable from previous. The upper  abdomen is unremarkable.    No focal airspace opacities. No pleural effusion. No pneumothorax.      Impression    Impression:   1. Interval advancement of the left IJ Welcome-Jax catheter now  terminating in the left pulmonary artery  2. Support devices otherwise stable.    [Urgent Result: Welcome-Jax position]    Finding was identified on 6/11/2018 7:45 PM.     Dr. Ganesh Miller was contacted by Dr. Conde at 6/11/2018 7:53 PM and  verbalized understanding of the urgent finding.      I have personally reviewed the examination and initial interpretation  and I agree with the findings.    CADY GARRISON MD   XR Chest Port 1 View    Narrative    Single view chest    Comparisons: Same date at 1914    HISTORY: [place] positioning.    FINDINGS: Left IJ [place]-Jax catheter is unchanged with tip in the left  lower lobe pulmonary artery. Right IJ central venous catheter is  unchanged. Left-sided PICC line is unchanged. Cardiac silhouette  remains enlarged. Engorged pulmonary vascularity which is relatively  distinct.      Impression    IMPRESSION:   1. No significant change in [place]-Jax catheter position with the tip  in the left lower lobe pulmonary artery.  2. Unchanged enlarged cardiac silhouette.  3. Pulmonary vascular congestion.    CADY GARRISON MD         I/O last 3 completed shifts:  In: 1136.4 [P.O.:750; I.V.:386.4]  Out: -       LINES/TUBES:  Data      PICC Double Lumen 04/22/18 Left Basilic (Active)   Site Assessment WDL 6/12/2018  4:00 AM   External Cath Length (cm) 4 cm 5/28/2018 12:00 AM   Extremity Circumference (cm) 34 cm 5/4/2018  2:00 PM   Dressing Intervention Chlorhexidine patch;Transparent 6/12/2018  4:00 AM   Extravasation? No 6/12/2018  4:00 AM   Dressing Change Due 06/07/18 6/12/2018  4:00 AM   PICC Comment CDI 6/12/2018  4:00 AM   PICC Lumen Assessment Trigger [place];Purple 6/12/2018  4:00 AM   Number of days:51       CVC Single Lumen 05/13/18 Left Internal jugular (Active)   Site Assessment WDL 6/12/2018  4:00 AM   Line Status Saline locked;Cap changed 6/12/2018  4:00 AM   Dressing Intervention Chlorhexidine sponge;Transparent 6/12/2018  4:00 AM   Extravasation? No 6/12/2018  4:00 AM   Dressing Change Due 06/17/18 6/12/2018  4:00 AM   CVC Comment swan 6/7/2018  8:00 AM   Number of days:30       CVC Double Lumen 04/17/18 Right Internal jugular (Active)   Site Assessment WDL 6/12/2018  4:00 AM   Lumen Soln/Vol REFERENCE 2.1A/2.1V 6/9/2018  7:20 PM   External Cath  Length (cm) 3.5 cm 5/26/2018  2:25 PM   Extravasation? No 6/12/2018  4:00 AM   Dressing Intervention Chlorhexidine sponge;Transparent 6/12/2018  4:00 AM   Dressing Change Due 06/17/18 6/12/2018  4:00 AM   CVC Comment CDI 6/12/2018  4:00 AM   CVC Lumen Assessment Red;Blue 6/12/2018  4:00 AM   Number of days:56       Pulmonary Artery Catheter - Single Lumen 05/13/18 1700 Left internal jugular vein (Active)   Dressing dressing dry and intact 6/12/2018  4:00 AM   Securement secured with sutures 6/12/2018  4:00 AM   PA Catheter Markings (cm) 70 6/12/2018  4:00 AM   Phlebitis 0-->no symptoms 6/12/2018  4:00 AM   Infiltration 0-->no symptoms 6/12/2018  4:00 AM   Waveform normal 6/12/2018  4:00 AM   Pressure Catheter Interventions other (see comments) 6/12/2018  4:00 AM   Daily Review Of Necessity completed 6/12/2018 12:00 AM   Number of days:30

## 2018-06-12 NOTE — PROGRESS NOTES
"  Nephrology Progress Note  06/12/2018         Assessment & Recommendations:   Murray Nicholson is a 63 year old year old male with PMH of HTN, DMII, functionally bicuspid aortic valve, CHF/CM (EF 10-15%), ESRD, admitted with worsening dyspnea/SOB, now on dobutamine and dopamine pending heart/kidney transplant.        ESRD: due to DM, on PD since Aug 2017, changed to HD 1/18, now TTS, at [facility] under care Dr Mcpherson, [facility] tunnel, EDW 75.5-76 kg, 3.5 hrs.   -- HD scheduled TTS   -- low dose heparin HD.   -- heparin lock CVC,     BP and volume:  EDW  75.5-76 kg. CVP 11, BP's soft, cards holding afterload reduction meds     Anemia: cont venofer 100 mg Qtues, cont Epo 4000 u with dialysis.       BMD: Ca 9.1, alb 2.7, phos 4.9       Severe AV and MR/ cardiomyopathy EF 15%: listed for heart kidney tx. On Dobutamine/Dopamine infusions      Kristi Armas PA-C  Division of Kidney Disease  Pager [phone]      Interval History :   No new concerns    Review of Systems:   I reviewed the following systems:  GI: Has good appetite  Neuro:  no confusion  Constitutional:  no fever or chills  CV: denies dyspnea or CP.     Physical Exam:   I/O last 3 completed shifts:  In: 1136.4 [P.O.:750; I.V.:386.4]  Out: -    /72  Pulse 107  Temp 98  F (36.7  C) (Oral)  Resp 24  Ht 1.727 m (5' 8\")  Wt 78.8 kg (173 lb 13.3 oz)  SpO2 100%  BMI 26.43 kg/m2     GENERAL APPEARANCE: alert, NAD  EYES:  no scleral icterus, pupils equal  PULM: lungs CTA.    CV: Tachy     -edema trace  GI: soft, NT, non distended  INTEGUMENT: no cyanosis or rash  NEURO:  Alert/oriented  Access - Right TDC    Labs:   All labs reviewed by me  Electrolytes/Renal -   Recent Labs   Lab Test  06/12/18   0435  06/11/18   0607  06/10/18   2144   06/03/18   0436   05/29/18   0434   NA  129*  131*  128*   < >  139   < >  139   POTASSIUM  5.0  4.5  4.9   < >  4.9   < >  4.8   CHLORIDE  94  94  92*   < >  104   < >  101   CO2  23  24  25   < >  26   < >  25   BUN  " 53*  40*  35*   < >  28   < >  66*   CR  8.51*  6.79*  6.13*   < >  5.10*   < >  9.50*   GLC  106*  98  181*   < >  124*   < >  101*   TRINI  9.1  8.7  8.7   < >  8.8   < >  9.1   MAG  2.6*  2.6*  2.7*   < >  2.0   < >  2.2   PHOS   --    --   4.9*   --   4.2   --   3.9    < > = values in this interval not displayed.       CBC -   Recent Labs   Lab Test  06/10/18   2144  06/09/18   0614  06/07/18   0610   WBC  5.0  5.6  6.3   HGB  9.8*  9.2*  8.8*   PLT  244  229  242       LFTs -   Recent Labs   Lab Test  06/10/18   2144  04/16/18   1546  03/07/18   0833  02/19/18   1558   ALKPHOS  121  123  129  102   BILITOTAL  0.7  0.8  0.7  0.6   ALT  16  22  21  15   AST  16  17  18  18   PROTTOTAL  6.8  7.4   --   7.2   ALBUMIN  2.7*  3.5   --   2.7*       Iron Panel -   Recent Labs   Lab Test  05/08/18   0954  07/19/17   1306  07/05/17   1204   IRON  58  46  26*   IRONSAT  27  18  12*   ALBERTINA  621*  369  542*         Imaging:  Exam: XR CHEST PORT 1 VW, 6/6/2018 11:02 AM     Indication: SG cath position check weekly;      Comparison: Radiograph the chest 5/28/2018     Findings:   AP view of the chest. Right IJ central venous catheter with the tip in  the high right atrium. Left IJ [place]-Jax catheter with the tip over  the main pulmonary artery. Left PICC tip projects over the mid SVC.  Cardiomediastinal silhouette is stable enlarged. Pulmonary vasculature  is distinct but with mild perihilar opacities. Mild bibasilar  opacities. No new focal airspace opacity. No pneumothorax or pleural  effusion. Upper abdomen is unremarkable.         Impression:   1. Left IJ [place]-Jax catheter projects with the tip over the main  pulmonary artery. Remaining support devices are stable.  2. Mild bibasilar atelectasis and pulmonary vascular congestion.     I have personally reviewed the examination and initial interpretation  and I agree with the findings.     KAYLIN ([place]) MD NELLI    Current Medications:    albuterol  2.5 mg Nebulization At  Bedtime     allopurinol  100 mg Oral Daily     aspirin  81 mg Oral Daily     atorvastatin  40 mg Oral Daily     cetirizine  10 mg Oral Daily     cyanocolbalamin  100 mcg Oral Daily     fluticasone  1-2 spray Both Nostrils Daily     gelatin absorbable  1 each Topical During Hemodialysis (from stock)     heparin  3 mL Intracatheter During Hemodialysis (from stock)     heparin  3 mL Intracatheter During Hemodialysis (from stock)     hydrALAZINE  10 mg Oral 4 times per day on Sun Mon Wed Fri    And     hydrALAZINE  10 mg Oral 2 times per day on Tue Thu Sat     insulin aspart   Subcutaneous QAM AC     insulin aspart   Subcutaneous Daily with lunch     insulin aspart   Subcutaneous Daily with supper     insulin aspart  1-10 Units Subcutaneous TID AC     insulin aspart  1-7 Units Subcutaneous At Bedtime     insulin glargine  10 Units Subcutaneous QAM AC     NEPHROCAPS  1 capsule Oral Daily     omeprazole  20 mg Oral Daily     sodium chloride (PF)  10 mL Intracatheter Q7 Days     sodium chloride (PF)  3 mL Intracatheter During Hemodialysis (from stock)     sodium chloride (PF)  3 mL Intracatheter During Hemodialysis (from stock)     sodium chloride (PF)  3 mL Intracatheter Q8H     vitamin  B-1  100 mg Oral Daily       DOBUTamine 2.5 mcg/kg/min (06/12/18 1300)     DOPamine 2.5 mcg/kg/min (06/12/18 1300)     heparin (porcine) 500 Units/hr (06/12/18 1209)     - MEDICATION INSTRUCTIONS -       Reason ACE/ARB/ARNI order not selected       Reason beta blocker order not selected       Kristi Armas PA-C

## 2018-06-12 NOTE — PROGRESS NOTES
HEMODIALYSIS TREATMENT NOTE    Date: 6/12/2018  Time: 4:29 PM    Data:  Pre Wt: 78.8 kg (173 lb 11.6 oz)   Desired Wt: 75.5 kg   Weight change: - 3 kg  Ultrafiltration - Post Run Net Total Removed (mL): 3000 mL  Ultrafiltration - Post Run Net Total Gain (mL): 0 mL  Vascular Access Status: Yes, secured and visible  Dialyzer Rinse: Light, Streaked  Total Blood Volume Processed: 67.1 Liters  Total Dialysis (Treatment) Time: 3.5 hrs     Interventions/Assessment:  Stable 3 hr HD tx via right CVC. Keep MAP > 70 per order. Pt 3.3 kg above EDW. Pt agreed to UF goal of 3 kg. Midodrine 10 mg given PO prior to start of tx by primary RN. Lines reversed d/t arterial pressure alarms. -110 throughout tx. Epogen 4000 units and Venofer 100 mg given IV. Pt had no complaints or issues. 2000 units total heparin given during tx. Post BP 99/64. CVC heparin locked and report given to primary RN Yared.      Plan:    Next HD scheduled for Thursday.

## 2018-06-12 NOTE — PROGRESS NOTES
Care Coordinator Progress Note    Admission Date/Time:  4/16/2018  Attending MD:  Klever Ernst MD    Data  Chart reviewed, discussed with interdisciplinary team.   Patient was admitted for:    Chronic systolic heart failure (H)  End stage renal disease (H).     Assessment  Pt remain in ICU as status 1A waiting for heart and kidney transplant.  RNCC visited pt for support.  Pt stated the team have been updating him well about the plan of care.       Plan  Anticipated Discharge Date:  TBD.  Anticipated Discharge Plan:   TBD.  RNCC will cont to follow plan of care.      Gianna Dougherty RN, PHN, BSN  4A and 4E/ ICU  Care Coordinator  Phone: [phone]  Pager: [phone]

## 2018-06-12 NOTE — PLAN OF CARE
Problem: Patient Care Overview  Goal: Plan of Care/Patient Progress Review  Outcome: No Change  D: Still on dopamine 2.5 mcg/kg/min and doputamine 2.5 mcg/kg/min. Pos 1.2 L and 1.1 kg from yesterday. BECKA AM score: CO 3.2 CI 1.7     A/I:  Neuro: Intact, PERRL, AOx4  Cardiac: Sinus Tachy 100s. SBP 90s/50s. Afrebrile Pulses +2. Generalized trace edema.  Resp: RA, clear aga breath sounds.  GI/: BM x2 formed brown. Very minimal urine. Dialysis today.   Skin: no issues   Access: Left PICC, Left IJ PA cath and sheath, R subclavian dialysis cath.       Plan: continue to monitor cardiac function. Contact cards 2 with any questions or concerns. IHD today.

## 2018-06-13 ENCOUNTER — APPOINTMENT (OUTPATIENT)
Dept: OCCUPATIONAL THERAPY | Facility: CLINIC | Age: 63
DRG: 001 | End: 2018-06-13
Attending: INTERNAL MEDICINE
Payer: COMMERCIAL

## 2018-06-13 LAB
ANION GAP SERPL CALCULATED.3IONS-SCNC: 9 MMOL/L (ref 3–14)
BASE EXCESS BLDV CALC-SCNC: 6.2 MMOL/L
BASE EXCESS BLDV CALC-SCNC: 7.2 MMOL/L
BUN SERPL-MCNC: 33 MG/DL (ref 7–30)
CALCIUM SERPL-MCNC: 8.7 MG/DL (ref 8.5–10.1)
CHLORIDE SERPL-SCNC: 97 MMOL/L (ref 94–109)
CO2 SERPL-SCNC: 29 MMOL/L (ref 20–32)
CREAT SERPL-MCNC: 5.72 MG/DL (ref 0.66–1.25)
ERYTHROCYTE [DISTWIDTH] IN BLOOD BY AUTOMATED COUNT: 17.2 % (ref 10–15)
GFR SERPL CREATININE-BSD FRML MDRD: 10 ML/MIN/1.7M2
GLUCOSE BLDC GLUCOMTR-MCNC: 182 MG/DL (ref 70–99)
GLUCOSE BLDC GLUCOMTR-MCNC: 195 MG/DL (ref 70–99)
GLUCOSE BLDC GLUCOMTR-MCNC: 88 MG/DL (ref 70–99)
GLUCOSE BLDC GLUCOMTR-MCNC: 97 MG/DL (ref 70–99)
GLUCOSE SERPL-MCNC: 95 MG/DL (ref 70–99)
HCO3 BLDV-SCNC: 31 MMOL/L (ref 21–28)
HCO3 BLDV-SCNC: 31 MMOL/L (ref 21–28)
HCT VFR BLD AUTO: 28.6 % (ref 40–53)
HGB BLD-MCNC: 9.2 G/DL (ref 13.3–17.7)
MAGNESIUM SERPL-MCNC: 1.8 MG/DL (ref 1.6–2.3)
MCH RBC QN AUTO: 29.1 PG (ref 26.5–33)
MCHC RBC AUTO-ENTMCNC: 32.2 G/DL (ref 31.5–36.5)
MCV RBC AUTO: 91 FL (ref 78–100)
O2/TOTAL GAS SETTING VFR VENT: 21 %
O2/TOTAL GAS SETTING VFR VENT: 21 %
OXYHGB MFR BLDV: 44 %
OXYHGB MFR BLDV: 51 %
PCO2 BLDV: 42 MM HG (ref 40–50)
PCO2 BLDV: 47 MM HG (ref 40–50)
PH BLDV: 7.43 PH (ref 7.32–7.43)
PH BLDV: 7.48 PH (ref 7.32–7.43)
PHOSPHATE SERPL-MCNC: 4.6 MG/DL (ref 2.5–4.5)
PLATELET # BLD AUTO: 200 10E9/L (ref 150–450)
PO2 BLDV: 27 MM HG (ref 25–47)
PO2 BLDV: 31 MM HG (ref 25–47)
POTASSIUM SERPL-SCNC: 4.5 MMOL/L (ref 3.4–5.3)
RBC # BLD AUTO: 3.16 10E12/L (ref 4.4–5.9)
SODIUM SERPL-SCNC: 134 MMOL/L (ref 133–144)
WBC # BLD AUTO: 5.2 10E9/L (ref 4–11)

## 2018-06-13 PROCEDURE — 40000048 ZZH STATISTIC DAILY SWAN MONITORING

## 2018-06-13 PROCEDURE — 80048 BASIC METABOLIC PNL TOTAL CA: CPT | Performed by: INTERNAL MEDICINE

## 2018-06-13 PROCEDURE — 25000128 H RX IP 250 OP 636: Performed by: NURSE PRACTITIONER

## 2018-06-13 PROCEDURE — 25000132 ZZH RX MED GY IP 250 OP 250 PS 637: Mod: GY | Performed by: INTERNAL MEDICINE

## 2018-06-13 PROCEDURE — 82805 BLOOD GASES W/O2 SATURATION: CPT | Performed by: INTERNAL MEDICINE

## 2018-06-13 PROCEDURE — 94640 AIRWAY INHALATION TREATMENT: CPT | Mod: 76

## 2018-06-13 PROCEDURE — 25000132 ZZH RX MED GY IP 250 OP 250 PS 637: Performed by: NURSE PRACTITIONER

## 2018-06-13 PROCEDURE — 25000128 H RX IP 250 OP 636: Performed by: STUDENT IN AN ORGANIZED HEALTH CARE EDUCATION/TRAINING PROGRAM

## 2018-06-13 PROCEDURE — 97110 THERAPEUTIC EXERCISES: CPT | Mod: GO | Performed by: OCCUPATIONAL THERAPIST

## 2018-06-13 PROCEDURE — 99232 SBSQ HOSP IP/OBS MODERATE 35: CPT | Mod: GC | Performed by: INTERNAL MEDICINE

## 2018-06-13 PROCEDURE — 25000132 ZZH RX MED GY IP 250 OP 250 PS 637: Mod: GY | Performed by: NURSE PRACTITIONER

## 2018-06-13 PROCEDURE — 40000275 ZZH STATISTIC RCP TIME EA 10 MIN

## 2018-06-13 PROCEDURE — 25000125 ZZHC RX 250: Performed by: STUDENT IN AN ORGANIZED HEALTH CARE EDUCATION/TRAINING PROGRAM

## 2018-06-13 PROCEDURE — 40000133 ZZH STATISTIC OT WARD VISIT: Performed by: OCCUPATIONAL THERAPIST

## 2018-06-13 PROCEDURE — A9270 NON-COVERED ITEM OR SERVICE: HCPCS | Mod: GY | Performed by: NURSE PRACTITIONER

## 2018-06-13 PROCEDURE — A9270 NON-COVERED ITEM OR SERVICE: HCPCS | Mod: GY | Performed by: STUDENT IN AN ORGANIZED HEALTH CARE EDUCATION/TRAINING PROGRAM

## 2018-06-13 PROCEDURE — 00000146 ZZHCL STATISTIC GLUCOSE BY METER IP

## 2018-06-13 PROCEDURE — 85027 COMPLETE CBC AUTOMATED: CPT | Performed by: INTERNAL MEDICINE

## 2018-06-13 PROCEDURE — 25000132 ZZH RX MED GY IP 250 OP 250 PS 637: Mod: GY | Performed by: STUDENT IN AN ORGANIZED HEALTH CARE EDUCATION/TRAINING PROGRAM

## 2018-06-13 PROCEDURE — A9270 NON-COVERED ITEM OR SERVICE: HCPCS | Mod: GY | Performed by: INTERNAL MEDICINE

## 2018-06-13 PROCEDURE — 20000004 ZZH R&B ICU UMMC

## 2018-06-13 PROCEDURE — 84100 ASSAY OF PHOSPHORUS: CPT | Performed by: INTERNAL MEDICINE

## 2018-06-13 PROCEDURE — 40000196 ZZH STATISTIC RAPCV CVP MONITORING

## 2018-06-13 PROCEDURE — 83735 ASSAY OF MAGNESIUM: CPT | Performed by: INTERNAL MEDICINE

## 2018-06-13 RX ADMIN — FLUTICASONE PROPIONATE 2 SPRAY: 50 SPRAY, METERED NASAL at 19:34

## 2018-06-13 RX ADMIN — ALLOPURINOL 100 MG: 100 TABLET ORAL at 08:31

## 2018-06-13 RX ADMIN — CETIRIZINE HYDROCHLORIDE 10 MG: 10 TABLET, FILM COATED ORAL at 08:31

## 2018-06-13 RX ADMIN — OMEPRAZOLE 20 MG: 20 CAPSULE, DELAYED RELEASE ORAL at 19:34

## 2018-06-13 RX ADMIN — VITAMIN B12 0.1 MG ORAL TABLET 100 MCG: 0.1 TABLET ORAL at 08:31

## 2018-06-13 RX ADMIN — ALBUTEROL SULFATE 2.5 MG: 2.5 SOLUTION RESPIRATORY (INHALATION) at 22:27

## 2018-06-13 RX ADMIN — HYDRALAZINE HYDROCHLORIDE 10 MG: 10 TABLET, FILM COATED ORAL at 17:49

## 2018-06-13 RX ADMIN — HYDRALAZINE HYDROCHLORIDE 10 MG: 10 TABLET, FILM COATED ORAL at 08:32

## 2018-06-13 RX ADMIN — Medication 1 CAPSULE: at 08:31

## 2018-06-13 RX ADMIN — INSULIN ASPART 2 UNITS: 100 INJECTION, SOLUTION INTRAVENOUS; SUBCUTANEOUS at 14:35

## 2018-06-13 RX ADMIN — HYDRALAZINE HYDROCHLORIDE 10 MG: 10 TABLET, FILM COATED ORAL at 13:05

## 2018-06-13 RX ADMIN — MAGNESIUM SULFATE HEPTAHYDRATE 2 G: 40 INJECTION, SOLUTION INTRAVENOUS at 09:10

## 2018-06-13 RX ADMIN — ASPIRIN 81 MG CHEWABLE TABLET 81 MG: 81 TABLET CHEWABLE at 19:34

## 2018-06-13 RX ADMIN — Medication 100 MG: at 08:31

## 2018-06-13 RX ADMIN — INSULIN ASPART 8 UNITS: 100 INJECTION, SOLUTION INTRAVENOUS; SUBCUTANEOUS at 14:35

## 2018-06-13 RX ADMIN — ATORVASTATIN CALCIUM 40 MG: 40 TABLET, FILM COATED ORAL at 19:34

## 2018-06-13 RX ADMIN — INSULIN GLARGINE 10 UNITS: 100 INJECTION, SOLUTION SUBCUTANEOUS at 08:38

## 2018-06-13 RX ADMIN — DOPAMINE HYDROCHLORIDE IN DEXTROSE 2.5 MCG/KG/MIN: 1.6 INJECTION, SOLUTION INTRAVENOUS at 23:47

## 2018-06-13 NOTE — PLAN OF CARE
Problem: Patient Care Overview  Goal: Plan of Care/Patient Progress Review  Outcome: No Change  D: Afebrile. Sinus tachycardia. Normotensive. A&Ox4. On RA.  I/A: Morning ficks numbers: CVP 9, PA 50/30, SVO2 44, CO 3.8, CI 2, SVR 1410. Dobutamine and dopamine gtts continue to be at 2.5 set rate. Slept well throughout night. Denies SOB.   P: Continue to monitor and assess pt. Consult Cards 2 team with any new changes/concerns.

## 2018-06-13 NOTE — PLAN OF CARE
Problem: Patient Care Overview  Goal: Plan of Care/Patient Progress Review  Discharge Planner OT   Patient plan for discharge: not sure 2/2 medical status   Current status: Pt walked 30 mins with VSS while on RA.  Barriers to return to prior living situation: medical status  Recommendations for discharge: pending medical course  Rationale for recommendations: Pt waiting for heart tx.        Entered by: Aryan Rice 06/13/2018 2:17 PM

## 2018-06-13 NOTE — PROGRESS NOTES
Cardiology Progress Note 06/13/2018    Assessment & Plan   63 year old male with a PMH of CAD, ICM (EF of 15-20%), ESRD, bicuspid aortic valve with AI, severe MR, and proximal AAA who was admitted for decompensated heart failure. He is on dobutamine 2.5 mcg/kg/min and listed for heart/kidney transplant as status 1A.    Major Changes:  - none, continue dobutamine and dopamine at current doses  - will be cautious about low BP in case early sign of sepsis  - lower the dose of hydralazine, holding isodil  - PVR 2.8 6/12  - SG position is now left PA    # Stage D NYHA Class III systolic heart failure secondary to ICM, listed 1A  # Severe mitral regurgitation  # Severe aortic insufficiency due to bicuspid Ao valve  # Non-sustained ventricular tachycardia (minimal)  - dobutamine 2.5 mcg/kg/min, dopamine 2.5 mcg/kg/min  Holding afterload meds for now   - hydralazine 25 mg q6 hours on non-dialysis days + 10 mg BID on dialysis days (after HD)   - isordil 10 mg TID on non-HD days  - weekly CXR for Webster position(SG placed 5/13)  - nephrology challenging dry weight  - PCW 16 PVR 2.8 QUINONES after hemodialysis       # Chronic Medical Issues:  - Seborrheic keratoses / Dermatofibromas / Multiple benign nevi / Nummular dermatitis: derm consulted, all lesions benign  - Hx of allergic rhinitis: cetrizine  - ESRD: Dialysis T, Th, Sa   - Non-obstructive CAD, Anomalous RCA: ASA 81 mg, atorvastatin 40 mg daily  - Ascending aortic aneurysm: 4.5 x 4.5 cm proximal ascending aortic aneurysm seen on MRI 2/14  - Multiple benign branch duct-IPMNs: no concerning features affecting transplant candidacy  - gout: allopurinol, prednisone 6/26 - 5/31 (tapered for acute flare)      FEN: regular diet, 2L FR  PROPHY: ambulation  LINES: Webster cath  DISPO: TBD  CODE STATUS:  Full    Case discussed with Dr. Blum.    Tunde Coffey  Cardiology Fellow      Interval History   No acute events   afebrile BP MAP 75-80  On 2.5 dobutamine and 2.5 dopamine  "    Physical Exam   Temp: 98.1  F (36.7  C) Temp src: Oral BP: 96/62   Heart Rate: 110 Resp: 16 SpO2: 100 % O2 Device: None (Room air) Oxygen Delivery: 2 LPM  Vitals:    06/11/18 0900 06/12/18 0500 06/13/18 0800   Weight: 77.7 kg (171 lb 6.5 oz) 78.8 kg (173 lb 13.3 oz) 76.9 kg (169 lb 10.3 oz)     Wt Readings from Last 2 Encounters:   06/13/18 76.9 kg (169 lb 10.3 oz)   04/16/18 76.9 kg (169 lb 9.6 oz)       Vital Signs with Ranges  Temp:  [97.2  F (36.2  C)-98.4  F (36.9  C)] 98.1  F (36.7  C)  Heart Rate:  [104-113] 110  Resp:  [16-18] 16  BP: ()/(61-76) 96/62  SpO2:  [95 %-100 %] 100 %  I/O last 3 completed shifts:  In: 1206.4 [P.O.:820; I.V.:386.4]  Out: 3000 [Other:3000]    SVR 3.5 6/9    Heart Rate: 110, Blood pressure 96/62, pulse 107, temperature 98.1  F (36.7  C), temperature source Oral, resp. rate 16, height 1.727 m (5' 8\"), weight 76.9 kg (169 lb 10.3 oz), SpO2 100 %.  169 lbs 10.3 oz  GEN:  Alert, oriented x 3, appears comfortable, NAD.  CV:  Regular rate and rhythm, no murmur no JVD  LUNGS:  Clear to auscultation bilaterally   ABD:  Active bowel sounds, soft, non-tender/non-distended.  No rebound/guarding/rigidity.  EXT:  No edema or cyanosis.    SKIN SG sheath site without redness. No sign of infection    Medications     DOBUTamine 2.5 mcg/kg/min (06/13/18 1000)     DOPamine 2.5 mcg/kg/min (06/13/18 1000)     - MEDICATION INSTRUCTIONS -       Reason ACE/ARB/ARNI order not selected       Reason beta blocker order not selected         albuterol  2.5 mg Nebulization At Bedtime     allopurinol  100 mg Oral Daily     aspirin  81 mg Oral Daily     atorvastatin  40 mg Oral Daily     cetirizine  10 mg Oral Daily     cyanocolbalamin  100 mcg Oral Daily     fluticasone  1-2 spray Both Nostrils Daily     hydrALAZINE  10 mg Oral 4 times per day on Sun Mon Wed Fri    And     hydrALAZINE  10 mg Oral 2 times per day on Tue Thu Sat     insulin aspart   Subcutaneous QAM      insulin aspart   Subcutaneous " Daily with lunch     insulin aspart   Subcutaneous Daily with supper     insulin aspart  1-10 Units Subcutaneous TID AC     insulin aspart  1-7 Units Subcutaneous At Bedtime     insulin glargine  10 Units Subcutaneous QAM AC     NEPHROCAPS  1 capsule Oral Daily     omeprazole  20 mg Oral Daily     sodium chloride (PF)  10 mL Intracatheter Q7 Days     sodium chloride (PF)  3 mL Intracatheter Q8H     vitamin  B-1  100 mg Oral Daily       Data     Recent Labs  Lab 06/13/18  0608 06/12/18  0435 06/11/18  0607 06/10/18  2144 06/09/18  0614   WBC 5.2  --   --  5.0 5.6   HGB 9.2*  --   --  9.8* 9.2*   MCV 91  --   --  91 91     --   --  244 229    129* 131* 128* 134   POTASSIUM 4.5 5.0 4.5 4.9 4.5   CHLORIDE 97 94 94 92* 98   CO2 29 23 24 25 25   BUN 33* 53* 40* 35* 44*   CR 5.72* 8.51* 6.79* 6.13* 7.17*   ANIONGAP 9 12 13 10 11   TRINI 8.7 9.1 8.7 8.7 8.5   GLC 95 106* 98 181* 100*   ALBUMIN  --   --   --  2.7*  --    PROTTOTAL  --   --   --  6.8  --    BILITOTAL  --   --   --  0.7  --    ALKPHOS  --   --   --  121  --    ALT  --   --   --  16  --    AST  --   --   --  16  --        Recent Results (from the past 24 hour(s))   XR Fluoro Port Time 0/1 Hour    Narrative    This exam was marked as non-reportable because it will not be read by a   radiologist or a [place] non-radiologist provider.                   I/O last 3 completed shifts:  In: 1206.4 [P.O.:820; I.V.:386.4]  Out: 3000 [Other:3000]      LINES/TUBES:  Data      PICC Double Lumen 04/22/18 Left Basilic (Active)   Site Assessment WDL 6/13/2018  8:00 AM   External Cath Length (cm) 4 cm 5/28/2018 12:00 AM   Extremity Circumference (cm) 34 cm 5/4/2018  2:00 PM   Dressing Intervention Chlorhexidine patch;Transparent 6/13/2018  8:00 AM   Extravasation? No 6/13/2018  8:00 AM   Dressing Change Due 06/17/18 6/13/2018  8:00 AM   PICC Comment CDI 6/12/2018  4:00 PM   PICC Lumen Assessment Trigger Magana;Purple 6/12/2018  4:00 AM   Number of days:52       CVC  Single Lumen 05/13/18 Left Internal jugular (Active)   Site Assessment WDL 6/13/2018  8:00 AM   Line Status Saline locked 6/13/2018  8:00 AM   Dressing Intervention Chlorhexidine sponge;Transparent 6/13/2018  8:00 AM   Extravasation? No 6/13/2018  8:00 AM   Dressing Change Due 06/17/18 6/13/2018  8:00 AM   CVC Comment rogerio 6/7/2018  8:00 AM   Number of days:31       CVC Double Lumen 04/17/18 Right Internal jugular (Active)   Site Assessment WDL 6/13/2018  8:00 AM   Lumen Soln/Vol REFERENCE 2.1A/2.1V 6/12/2018  3:41 PM   External Cath Length (cm) 3.5 cm 5/26/2018  2:25 PM   Extravasation? No 6/13/2018  8:00 AM   Dressing Intervention Chlorhexidine sponge;Transparent 6/13/2018  8:00 AM   Dressing Change Due 06/17/18 6/13/2018  8:00 AM   CVC Comment CDI 6/12/2018  4:00 PM   CVC Lumen Assessment Red;Blue 6/12/2018  3:41 PM   Number of days:57       Pulmonary Artery Catheter - Single Lumen 05/13/18 1700 Left internal jugular vein (Active)   Dressing dressing dry and intact 6/13/2018  8:00 AM   Securement secured with sutures 6/13/2018  8:00 AM   PA Catheter Markings (cm) 70 6/13/2018  8:00 AM   Phlebitis 0-->no symptoms 6/13/2018  8:00 AM   Infiltration 0-->no symptoms 6/13/2018  8:00 AM   Waveform normal 6/13/2018  8:00 AM   Pressure Catheter Interventions line leveled/zeroed 6/13/2018  8:00 AM   Daily Review Of Necessity completed 6/13/2018  8:00 AM   Number of days:31

## 2018-06-13 NOTE — PLAN OF CARE
Problem: Cardiac: Heart Failure (Adult)  Goal: Signs and Symptoms of Listed Potential Problems Will be Absent, Minimized or Managed (Cardiac: Heart Failure)  Signs and symptoms of listed potential problems will be absent, minimized or managed by discharge/transition of care (reference Cardiac: Heart Failure (Adult) CPG).   Outcome: No Change   06/13/18 8329   Cardiac: Heart Failure   Problems Assessed (Heart Failure) all   Problems Present (Heart Failure) cardiac pump dysfunction;decreased quality of life;situational response     D/I/A:  Status 1A for heart, kidney. AOx4 in room. ST + rare PVCs, PACs (100's - 110's), SaO2 >95% RA. AM Mg = 1.8, replaced per protocol. L-IJ [place] @ 70. CVP: 6, PA: 46/28, SvO2 51, CO 3.9, CI 2.0, SVR 1512. Dobutamine @ 2.5 m/k/m, dopamine @ 2.5 m/k/m. Ambulated w/ rehab.     Plan:  Monitor HR/R, fluid status. Notify provider of changes. HD 6/14.

## 2018-06-14 ENCOUNTER — ANESTHESIA EVENT (OUTPATIENT)
Dept: SURGERY | Facility: CLINIC | Age: 63
End: 2018-06-14

## 2018-06-14 ENCOUNTER — ANESTHESIA (OUTPATIENT)
Dept: SURGERY | Facility: CLINIC | Age: 63
End: 2018-06-14

## 2018-06-14 ENCOUNTER — DOCUMENTATION ONLY (OUTPATIENT)
Dept: TRANSPLANT | Facility: CLINIC | Age: 63
End: 2018-06-14

## 2018-06-14 ENCOUNTER — RESULTS ONLY (OUTPATIENT)
Dept: CARDIOLOGY | Facility: CLINIC | Age: 63
End: 2018-06-14

## 2018-06-14 ENCOUNTER — ORGAN (OUTPATIENT)
Dept: TRANSPLANT | Facility: CLINIC | Age: 63
End: 2018-06-14

## 2018-06-14 ENCOUNTER — APPOINTMENT (OUTPATIENT)
Dept: GENERAL RADIOLOGY | Facility: CLINIC | Age: 63
DRG: 001 | End: 2018-06-14
Attending: PHYSICIAN ASSISTANT
Payer: COMMERCIAL

## 2018-06-14 ENCOUNTER — APPOINTMENT (OUTPATIENT)
Dept: CT IMAGING | Facility: CLINIC | Age: 63
DRG: 001 | End: 2018-06-14
Attending: INTERNAL MEDICINE
Payer: COMMERCIAL

## 2018-06-14 LAB
ABO + RH BLD: NORMAL
ABO + RH BLD: NORMAL
ALBUMIN SERPL-MCNC: 3 G/DL (ref 3.4–5)
ALP SERPL-CCNC: 124 U/L (ref 40–150)
ALT SERPL W P-5'-P-CCNC: 16 U/L (ref 0–70)
AMYLASE SERPL-CCNC: 62 U/L (ref 30–110)
ANION GAP SERPL CALCULATED.3IONS-SCNC: 10 MMOL/L (ref 3–14)
ANION GAP SERPL CALCULATED.3IONS-SCNC: 12 MMOL/L (ref 3–14)
APTT PPP: 36 SEC (ref 22–37)
APTT PPP: 59 SEC (ref 22–37)
AST SERPL W P-5'-P-CCNC: 20 U/L (ref 0–45)
BASE EXCESS BLDV CALC-SCNC: 5.2 MMOL/L
BASE EXCESS BLDV CALC-SCNC: 9 MMOL/L
BASOPHILS # BLD AUTO: 0 10E9/L (ref 0–0.2)
BASOPHILS NFR BLD AUTO: 0.8 %
BILIRUB SERPL-MCNC: 1 MG/DL (ref 0.2–1.3)
BLD GP AB SCN SERPL QL: NORMAL
BLD PROD TYP BPU: NORMAL
BLD UNIT ID BPU: 0
BLOOD BANK CMNT PATIENT-IMP: NORMAL
BLOOD PRODUCT CODE: NORMAL
BPU ID: NORMAL
BUN SERPL-MCNC: 25 MG/DL (ref 7–30)
BUN SERPL-MCNC: 42 MG/DL (ref 7–30)
CALCIUM SERPL-MCNC: 9 MG/DL (ref 8.5–10.1)
CALCIUM SERPL-MCNC: 9.1 MG/DL (ref 8.5–10.1)
CHLORIDE SERPL-SCNC: 96 MMOL/L (ref 94–109)
CHLORIDE SERPL-SCNC: 96 MMOL/L (ref 94–109)
CO2 SERPL-SCNC: 26 MMOL/L (ref 20–32)
CO2 SERPL-SCNC: 28 MMOL/L (ref 20–32)
CREAT SERPL-MCNC: 4.89 MG/DL (ref 0.66–1.25)
CREAT SERPL-MCNC: 8.01 MG/DL (ref 0.66–1.25)
DIFFERENTIAL METHOD BLD: ABNORMAL
EOSINOPHIL # BLD AUTO: 0.2 10E9/L (ref 0–0.7)
EOSINOPHIL NFR BLD AUTO: 4.6 %
ERYTHROCYTE [DISTWIDTH] IN BLOOD BY AUTOMATED COUNT: 17.2 % (ref 10–15)
ERYTHROCYTE [DISTWIDTH] IN BLOOD BY AUTOMATED COUNT: 17.3 % (ref 10–15)
FIBRINOGEN PPP-MCNC: 447 MG/DL (ref 200–420)
GFR SERPL CREATININE-BSD FRML MDRD: 12 ML/MIN/1.7M2
GFR SERPL CREATININE-BSD FRML MDRD: 7 ML/MIN/1.7M2
GLUCOSE BLDC GLUCOMTR-MCNC: 117 MG/DL (ref 70–99)
GLUCOSE BLDC GLUCOMTR-MCNC: 155 MG/DL (ref 70–99)
GLUCOSE SERPL-MCNC: 109 MG/DL (ref 70–99)
GLUCOSE SERPL-MCNC: 138 MG/DL (ref 70–99)
GRAM STN SPEC: NORMAL
GRAM STN SPEC: NORMAL
HCO3 BLDV-SCNC: 30 MMOL/L (ref 21–28)
HCO3 BLDV-SCNC: 33 MMOL/L (ref 21–28)
HCT VFR BLD AUTO: 27.7 % (ref 40–53)
HCT VFR BLD AUTO: 28.8 % (ref 40–53)
HGB BLD-MCNC: 8.9 G/DL (ref 13.3–17.7)
HGB BLD-MCNC: 9.5 G/DL (ref 13.3–17.7)
IMM GRANULOCYTES # BLD: 0 10E9/L (ref 0–0.4)
IMM GRANULOCYTES NFR BLD: 0 %
INR PPP: 1.14 (ref 0.86–1.14)
INR PPP: 1.4 (ref 0.86–1.14)
LYMPHOCYTES # BLD AUTO: 0.8 10E9/L (ref 0.8–5.3)
LYMPHOCYTES NFR BLD AUTO: 16.2 %
MAGNESIUM SERPL-MCNC: 2.1 MG/DL (ref 1.6–2.3)
MAGNESIUM SERPL-MCNC: 2.4 MG/DL (ref 1.6–2.3)
MCH RBC QN AUTO: 29 PG (ref 26.5–33)
MCH RBC QN AUTO: 29.9 PG (ref 26.5–33)
MCHC RBC AUTO-ENTMCNC: 32.1 G/DL (ref 31.5–36.5)
MCHC RBC AUTO-ENTMCNC: 33 G/DL (ref 31.5–36.5)
MCV RBC AUTO: 90 FL (ref 78–100)
MCV RBC AUTO: 91 FL (ref 78–100)
MONOCYTES # BLD AUTO: 0.3 10E9/L (ref 0–1.3)
MONOCYTES NFR BLD AUTO: 5.6 %
NEUTROPHILS # BLD AUTO: 3.8 10E9/L (ref 1.6–8.3)
NEUTROPHILS NFR BLD AUTO: 72.8 %
NRBC # BLD AUTO: 0 10*3/UL
NRBC BLD AUTO-RTO: 0 /100
NUM BPU REQUESTED: 4
NUM BPU REQUESTED: 4
O2/TOTAL GAS SETTING VFR VENT: 21 %
O2/TOTAL GAS SETTING VFR VENT: 21 %
OXYHGB MFR BLDV: 48 %
OXYHGB MFR BLDV: 59 %
PCO2 BLDV: 41 MM HG (ref 40–50)
PCO2 BLDV: 44 MM HG (ref 40–50)
PH BLDV: 7.44 PH (ref 7.32–7.43)
PH BLDV: 7.51 PH (ref 7.32–7.43)
PHOSPHATE SERPL-MCNC: 2.9 MG/DL (ref 2.5–4.5)
PHOSPHATE SERPL-MCNC: 5.6 MG/DL (ref 2.5–4.5)
PLATELET # BLD AUTO: 127 10E9/L (ref 150–450)
PLATELET # BLD AUTO: 202 10E9/L (ref 150–450)
PLATELET # BLD AUTO: 236 10E9/L (ref 150–450)
PO2 BLDV: 29 MM HG (ref 25–47)
PO2 BLDV: 32 MM HG (ref 25–47)
POTASSIUM SERPL-SCNC: 3.8 MMOL/L (ref 3.4–5.3)
POTASSIUM SERPL-SCNC: 5.2 MMOL/L (ref 3.4–5.3)
PROT SERPL-MCNC: 7.2 G/DL (ref 6.8–8.8)
RBC # BLD AUTO: 3.07 10E12/L (ref 4.4–5.9)
RBC # BLD AUTO: 3.18 10E12/L (ref 4.4–5.9)
SODIUM SERPL-SCNC: 134 MMOL/L (ref 133–144)
SODIUM SERPL-SCNC: 135 MMOL/L (ref 133–144)
SPECIMEN EXP DATE BLD: NORMAL
SPECIMEN SOURCE: NORMAL
TRANSFUSION STATUS PATIENT QL: NORMAL
WBC # BLD AUTO: 4.7 10E9/L (ref 4–11)
WBC # BLD AUTO: 5.2 10E9/L (ref 4–11)

## 2018-06-14 PROCEDURE — 80048 BASIC METABOLIC PNL TOTAL CA: CPT | Performed by: NURSE PRACTITIONER

## 2018-06-14 PROCEDURE — 84100 ASSAY OF PHOSPHORUS: CPT | Performed by: PHYSICIAN ASSISTANT

## 2018-06-14 PROCEDURE — 25000128 H RX IP 250 OP 636: Performed by: STUDENT IN AN ORGANIZED HEALTH CARE EDUCATION/TRAINING PROGRAM

## 2018-06-14 PROCEDURE — 86901 BLOOD TYPING SEROLOGIC RH(D): CPT | Performed by: PHYSICIAN ASSISTANT

## 2018-06-14 PROCEDURE — 88311 DECALCIFY TISSUE: CPT | Performed by: THORACIC SURGERY (CARDIOTHORACIC VASCULAR SURGERY)

## 2018-06-14 PROCEDURE — 36000074 ZZH SURGERY LEVEL 6 1ST 30 MIN - UMMC: Performed by: THORACIC SURGERY (CARDIOTHORACIC VASCULAR SURGERY)

## 2018-06-14 PROCEDURE — 87176 TISSUE HOMOGENIZATION CULTR: CPT | Performed by: THORACIC SURGERY (CARDIOTHORACIC VASCULAR SURGERY)

## 2018-06-14 PROCEDURE — 86900 BLOOD TYPING SEROLOGIC ABO: CPT | Performed by: PHYSICIAN ASSISTANT

## 2018-06-14 PROCEDURE — 40000275 ZZH STATISTIC RCP TIME EA 10 MIN

## 2018-06-14 PROCEDURE — 02C60ZZ EXTIRPATION OF MATTER FROM RIGHT ATRIUM, OPEN APPROACH: ICD-10-PCS | Performed by: THORACIC SURGERY (CARDIOTHORACIC VASCULAR SURGERY)

## 2018-06-14 PROCEDURE — 85027 COMPLETE CBC AUTOMATED: CPT | Performed by: NURSE PRACTITIONER

## 2018-06-14 PROCEDURE — 5A1221Z PERFORMANCE OF CARDIAC OUTPUT, CONTINUOUS: ICD-10-PCS | Performed by: THORACIC SURGERY (CARDIOTHORACIC VASCULAR SURGERY)

## 2018-06-14 PROCEDURE — 88313 SPECIAL STAINS GROUP 2: CPT | Performed by: THORACIC SURGERY (CARDIOTHORACIC VASCULAR SURGERY)

## 2018-06-14 PROCEDURE — 02YA0Z0 TRANSPLANTATION OF HEART, ALLOGENEIC, OPEN APPROACH: ICD-10-PCS | Performed by: THORACIC SURGERY (CARDIOTHORACIC VASCULAR SURGERY)

## 2018-06-14 PROCEDURE — P9037 PLATE PHERES LEUKOREDU IRRAD: HCPCS | Performed by: INTERNAL MEDICINE

## 2018-06-14 PROCEDURE — 40000344 ZZHCL STATISTIC THAWING COMPONENT: Performed by: INTERNAL MEDICINE

## 2018-06-14 PROCEDURE — 85049 AUTOMATED PLATELET COUNT: CPT | Performed by: INTERNAL MEDICINE

## 2018-06-14 PROCEDURE — 25000128 H RX IP 250 OP 636: Performed by: RADIOLOGY

## 2018-06-14 PROCEDURE — 80053 COMPREHEN METABOLIC PANEL: CPT | Performed by: PHYSICIAN ASSISTANT

## 2018-06-14 PROCEDURE — 36000076 ZZH SURGERY LEVEL 6 EA 15 ADDTL MIN - UMMC: Performed by: THORACIC SURGERY (CARDIOTHORACIC VASCULAR SURGERY)

## 2018-06-14 PROCEDURE — 25000125 ZZHC RX 250: Performed by: STUDENT IN AN ORGANIZED HEALTH CARE EDUCATION/TRAINING PROGRAM

## 2018-06-14 PROCEDURE — A9270 NON-COVERED ITEM OR SERVICE: HCPCS | Mod: GY | Performed by: STUDENT IN AN ORGANIZED HEALTH CARE EDUCATION/TRAINING PROGRAM

## 2018-06-14 PROCEDURE — 25000128 H RX IP 250 OP 636: Performed by: PHYSICIAN ASSISTANT

## 2018-06-14 PROCEDURE — 84132 ASSAY OF SERUM POTASSIUM: CPT | Performed by: PHYSICIAN ASSISTANT

## 2018-06-14 PROCEDURE — 27210460 ZZH PUMP APP ADULT PERFUSION: Performed by: THORACIC SURGERY (CARDIOTHORACIC VASCULAR SURGERY)

## 2018-06-14 PROCEDURE — 41000018 ZZH PER-PERFUSION 1ST 30 MIN: Performed by: THORACIC SURGERY (CARDIOTHORACIC VASCULAR SURGERY)

## 2018-06-14 PROCEDURE — 88309 TISSUE EXAM BY PATHOLOGIST: CPT | Performed by: THORACIC SURGERY (CARDIOTHORACIC VASCULAR SURGERY)

## 2018-06-14 PROCEDURE — 27210447 ZZH PACK CELL SAVER CSP: Performed by: THORACIC SURGERY (CARDIOTHORACIC VASCULAR SURGERY)

## 2018-06-14 PROCEDURE — 40000048 ZZH STATISTIC DAILY SWAN MONITORING

## 2018-06-14 PROCEDURE — 87077 CULTURE AEROBIC IDENTIFY: CPT | Performed by: THORACIC SURGERY (CARDIOTHORACIC VASCULAR SURGERY)

## 2018-06-14 PROCEDURE — 99232 SBSQ HOSP IP/OBS MODERATE 35: CPT | Mod: GC | Performed by: INTERNAL MEDICINE

## 2018-06-14 PROCEDURE — 82330 ASSAY OF CALCIUM: CPT | Performed by: PHYSICIAN ASSISTANT

## 2018-06-14 PROCEDURE — P9059 PLASMA, FRZ BETWEEN 8-24HOUR: HCPCS | Performed by: INTERNAL MEDICINE

## 2018-06-14 PROCEDURE — 25000565 ZZH ISOFLURANE, EA 15 MIN: Performed by: THORACIC SURGERY (CARDIOTHORACIC VASCULAR SURGERY)

## 2018-06-14 PROCEDURE — 83605 ASSAY OF LACTIC ACID: CPT | Performed by: PHYSICIAN ASSISTANT

## 2018-06-14 PROCEDURE — 02QX0ZZ REPAIR THORACIC AORTA, ASCENDING/ARCH, OPEN APPROACH: ICD-10-PCS | Performed by: THORACIC SURGERY (CARDIOTHORACIC VASCULAR SURGERY)

## 2018-06-14 PROCEDURE — 85610 PROTHROMBIN TIME: CPT | Performed by: PHYSICIAN ASSISTANT

## 2018-06-14 PROCEDURE — 85025 COMPLETE CBC W/AUTO DIFF WBC: CPT | Performed by: PHYSICIAN ASSISTANT

## 2018-06-14 PROCEDURE — 87015 SPECIMEN INFECT AGNT CONCNTJ: CPT | Performed by: THORACIC SURGERY (CARDIOTHORACIC VASCULAR SURGERY)

## 2018-06-14 PROCEDURE — 25000132 ZZH RX MED GY IP 250 OP 250 PS 637: Performed by: NURSE PRACTITIONER

## 2018-06-14 PROCEDURE — 00000146 ZZHCL STATISTIC GLUCOSE BY METER IP

## 2018-06-14 PROCEDURE — 25000131 ZZH RX MED GY IP 250 OP 636 PS 637: Performed by: PHYSICIAN ASSISTANT

## 2018-06-14 PROCEDURE — 25000125 ZZHC RX 250

## 2018-06-14 PROCEDURE — 83735 ASSAY OF MAGNESIUM: CPT | Performed by: PHYSICIAN ASSISTANT

## 2018-06-14 PROCEDURE — 87340 HEPATITIS B SURFACE AG IA: CPT | Performed by: INTERNAL MEDICINE

## 2018-06-14 PROCEDURE — 84100 ASSAY OF PHOSPHORUS: CPT | Performed by: NURSE PRACTITIONER

## 2018-06-14 PROCEDURE — 82805 BLOOD GASES W/O2 SATURATION: CPT | Performed by: INTERNAL MEDICINE

## 2018-06-14 PROCEDURE — 25000128 H RX IP 250 OP 636: Performed by: INTERNAL MEDICINE

## 2018-06-14 PROCEDURE — 90937 HEMODIALYSIS REPEATED EVAL: CPT

## 2018-06-14 PROCEDURE — 25000132 ZZH RX MED GY IP 250 OP 250 PS 637: Mod: GY | Performed by: NURSE PRACTITIONER

## 2018-06-14 PROCEDURE — 86644 CMV ANTIBODY: CPT | Performed by: PHYSICIAN ASSISTANT

## 2018-06-14 PROCEDURE — 37000008 ZZH ANESTHESIA TECHNICAL FEE, 1ST 30 MIN: Performed by: THORACIC SURGERY (CARDIOTHORACIC VASCULAR SURGERY)

## 2018-06-14 PROCEDURE — 25000125 ZZHC RX 250: Performed by: INTERNAL MEDICINE

## 2018-06-14 PROCEDURE — 41000019 ZZH PERA-PERFUSION EACH ADDTL 15 MIN: Performed by: THORACIC SURGERY (CARDIOTHORACIC VASCULAR SURGERY)

## 2018-06-14 PROCEDURE — 25000132 ZZH RX MED GY IP 250 OP 250 PS 637: Mod: GY | Performed by: STUDENT IN AN ORGANIZED HEALTH CARE EDUCATION/TRAINING PROGRAM

## 2018-06-14 PROCEDURE — 27210794 ZZH OR GENERAL SUPPLY STERILE: Performed by: THORACIC SURGERY (CARDIOTHORACIC VASCULAR SURGERY)

## 2018-06-14 PROCEDURE — 20000004 ZZH R&B ICU UMMC

## 2018-06-14 PROCEDURE — 27210995 ZZH RX 272: Performed by: THORACIC SURGERY (CARDIOTHORACIC VASCULAR SURGERY)

## 2018-06-14 PROCEDURE — 71260 CT THORAX DX C+: CPT

## 2018-06-14 PROCEDURE — 87070 CULTURE OTHR SPECIMN AEROBIC: CPT | Performed by: THORACIC SURGERY (CARDIOTHORACIC VASCULAR SURGERY)

## 2018-06-14 PROCEDURE — 81200010 ZZH ACQUISITION HEART CADAVER

## 2018-06-14 PROCEDURE — 82150 ASSAY OF AMYLASE: CPT | Performed by: PHYSICIAN ASSISTANT

## 2018-06-14 PROCEDURE — 87205 SMEAR GRAM STAIN: CPT | Performed by: THORACIC SURGERY (CARDIOTHORACIC VASCULAR SURGERY)

## 2018-06-14 PROCEDURE — 25000125 ZZHC RX 250: Performed by: THORACIC SURGERY (CARDIOTHORACIC VASCULAR SURGERY)

## 2018-06-14 PROCEDURE — C1763 CONN TISS, NON-HUMAN: HCPCS | Performed by: THORACIC SURGERY (CARDIOTHORACIC VASCULAR SURGERY)

## 2018-06-14 PROCEDURE — A9270 NON-COVERED ITEM OR SERVICE: HCPCS | Mod: GY | Performed by: NURSE PRACTITIONER

## 2018-06-14 PROCEDURE — 85610 PROTHROMBIN TIME: CPT | Performed by: INTERNAL MEDICINE

## 2018-06-14 PROCEDURE — 25000125 ZZHC RX 250: Performed by: NURSE ANESTHETIST, CERTIFIED REGISTERED

## 2018-06-14 PROCEDURE — 37000009 ZZH ANESTHESIA TECHNICAL FEE, EACH ADDTL 15 MIN: Performed by: THORACIC SURGERY (CARDIOTHORACIC VASCULAR SURGERY)

## 2018-06-14 PROCEDURE — 87206 SMEAR FLUORESCENT/ACID STAI: CPT | Performed by: THORACIC SURGERY (CARDIOTHORACIC VASCULAR SURGERY)

## 2018-06-14 PROCEDURE — 84295 ASSAY OF SERUM SODIUM: CPT | Performed by: PHYSICIAN ASSISTANT

## 2018-06-14 PROCEDURE — 85384 FIBRINOGEN ACTIVITY: CPT | Performed by: INTERNAL MEDICINE

## 2018-06-14 PROCEDURE — 88305 TISSUE EXAM BY PATHOLOGIST: CPT | Performed by: THORACIC SURGERY (CARDIOTHORACIC VASCULAR SURGERY)

## 2018-06-14 PROCEDURE — 82803 BLOOD GASES ANY COMBINATION: CPT | Performed by: PHYSICIAN ASSISTANT

## 2018-06-14 PROCEDURE — 87102 FUNGUS ISOLATION CULTURE: CPT | Performed by: THORACIC SURGERY (CARDIOTHORACIC VASCULAR SURGERY)

## 2018-06-14 PROCEDURE — P9041 ALBUMIN (HUMAN),5%, 50ML: HCPCS

## 2018-06-14 PROCEDURE — 86706 HEP B SURFACE ANTIBODY: CPT | Performed by: INTERNAL MEDICINE

## 2018-06-14 PROCEDURE — 71045 X-RAY EXAM CHEST 1 VIEW: CPT

## 2018-06-14 PROCEDURE — 63400005 ZZH RX 634: Performed by: INTERNAL MEDICINE

## 2018-06-14 PROCEDURE — 85730 THROMBOPLASTIN TIME PARTIAL: CPT | Performed by: PHYSICIAN ASSISTANT

## 2018-06-14 PROCEDURE — 86850 RBC ANTIBODY SCREEN: CPT | Performed by: PHYSICIAN ASSISTANT

## 2018-06-14 PROCEDURE — 87389 HIV-1 AG W/HIV-1&-2 AB AG IA: CPT | Performed by: PHYSICIAN ASSISTANT

## 2018-06-14 PROCEDURE — 86665 EPSTEIN-BARR CAPSID VCA: CPT | Performed by: PHYSICIAN ASSISTANT

## 2018-06-14 PROCEDURE — 87075 CULTR BACTERIA EXCEPT BLOOD: CPT | Performed by: THORACIC SURGERY (CARDIOTHORACIC VASCULAR SURGERY)

## 2018-06-14 PROCEDURE — P9016 RBC LEUKOCYTES REDUCED: HCPCS | Performed by: STUDENT IN AN ORGANIZED HEALTH CARE EDUCATION/TRAINING PROGRAM

## 2018-06-14 PROCEDURE — 83735 ASSAY OF MAGNESIUM: CPT | Performed by: NURSE PRACTITIONER

## 2018-06-14 PROCEDURE — 25000128 H RX IP 250 OP 636: Performed by: NURSE ANESTHETIST, CERTIFIED REGISTERED

## 2018-06-14 PROCEDURE — 25000128 H RX IP 250 OP 636: Performed by: THORACIC SURGERY (CARDIOTHORACIC VASCULAR SURGERY)

## 2018-06-14 PROCEDURE — 85730 THROMBOPLASTIN TIME PARTIAL: CPT | Performed by: INTERNAL MEDICINE

## 2018-06-14 PROCEDURE — 82947 ASSAY GLUCOSE BLOOD QUANT: CPT | Performed by: PHYSICIAN ASSISTANT

## 2018-06-14 PROCEDURE — 40000196 ZZH STATISTIC RAPCV CVP MONITORING

## 2018-06-14 PROCEDURE — 87116 MYCOBACTERIA CULTURE: CPT | Performed by: THORACIC SURGERY (CARDIOTHORACIC VASCULAR SURGERY)

## 2018-06-14 PROCEDURE — 25000128 H RX IP 250 OP 636

## 2018-06-14 DEVICE — GRAFT PERICARDIUM 6X8CM BOVINE E6P8: Type: IMPLANTABLE DEVICE | Site: HEART | Status: FUNCTIONAL

## 2018-06-14 RX ORDER — HEPARIN SODIUM 1000 [USP'U]/ML
500 INJECTION, SOLUTION INTRAVENOUS; SUBCUTANEOUS
Status: COMPLETED | OUTPATIENT
Start: 2018-06-14 | End: 2018-06-14

## 2018-06-14 RX ORDER — MYCOPHENOLATE MOFETIL 250 MG/1
1500 CAPSULE ORAL EVERY 12 HOURS
Status: DISCONTINUED | OUTPATIENT
Start: 2018-06-14 | End: 2018-06-15 | Stop reason: HOSPADM

## 2018-06-14 RX ORDER — FENTANYL CITRATE 50 UG/ML
INJECTION, SOLUTION INTRAMUSCULAR; INTRAVENOUS PRN
Status: DISCONTINUED | OUTPATIENT
Start: 2018-06-14 | End: 2018-06-15

## 2018-06-14 RX ORDER — HEPARIN SODIUM 1000 [USP'U]/ML
500 INJECTION, SOLUTION INTRAVENOUS; SUBCUTANEOUS CONTINUOUS
Status: DISCONTINUED | OUTPATIENT
Start: 2018-06-14 | End: 2018-06-14

## 2018-06-14 RX ORDER — VANCOMYCIN HYDROCHLORIDE 1 G/200ML
1000 INJECTION, SOLUTION INTRAVENOUS SEE ADMIN INSTRUCTIONS
Status: DISCONTINUED | OUTPATIENT
Start: 2018-06-14 | End: 2018-06-15 | Stop reason: HOSPADM

## 2018-06-14 RX ORDER — NITROGLYCERIN 10 MG/100ML
INJECTION INTRAVENOUS PRN
Status: DISCONTINUED | OUTPATIENT
Start: 2018-06-14 | End: 2018-06-15

## 2018-06-14 RX ORDER — CALCIUM CHLORIDE 100 MG/ML
INJECTION INTRAVENOUS; INTRAVENTRICULAR PRN
Status: DISCONTINUED | OUTPATIENT
Start: 2018-06-14 | End: 2018-06-15

## 2018-06-14 RX ORDER — PROPOFOL 10 MG/ML
INJECTION, EMULSION INTRAVENOUS CONTINUOUS PRN
Status: DISCONTINUED | OUTPATIENT
Start: 2018-06-14 | End: 2018-06-15

## 2018-06-14 RX ORDER — EPHEDRINE SULFATE 50 MG/ML
INJECTION, SOLUTION INTRAMUSCULAR; INTRAVENOUS; SUBCUTANEOUS PRN
Status: DISCONTINUED | OUTPATIENT
Start: 2018-06-14 | End: 2018-06-15

## 2018-06-14 RX ORDER — DESMOPRESSIN ACETATE 4 UG/ML
0.3 INJECTION, SOLUTION INTRAVENOUS; SUBCUTANEOUS ONCE
Status: COMPLETED | OUTPATIENT
Start: 2018-06-14 | End: 2018-06-14

## 2018-06-14 RX ORDER — VANCOMYCIN HYDROCHLORIDE 1 G/200ML
1000 INJECTION, SOLUTION INTRAVENOUS
Status: COMPLETED | OUTPATIENT
Start: 2018-06-14 | End: 2018-06-14

## 2018-06-14 RX ORDER — SODIUM CHLORIDE 9 MG/ML
INJECTION, SOLUTION INTRAVENOUS CONTINUOUS PRN
Status: DISCONTINUED | OUTPATIENT
Start: 2018-06-14 | End: 2018-06-14

## 2018-06-14 RX ORDER — SODIUM CHLORIDE 9 MG/ML
INJECTION, SOLUTION INTRAVENOUS CONTINUOUS PRN
Status: DISCONTINUED | OUTPATIENT
Start: 2018-06-14 | End: 2018-06-15

## 2018-06-14 RX ORDER — ASPIRIN 81 MG/1
81 TABLET, CHEWABLE ORAL
Status: DISCONTINUED | OUTPATIENT
Start: 2018-06-14 | End: 2018-06-15

## 2018-06-14 RX ORDER — IOPAMIDOL 755 MG/ML
100 INJECTION, SOLUTION INTRAVASCULAR ONCE
Status: COMPLETED | OUTPATIENT
Start: 2018-06-14 | End: 2018-06-14

## 2018-06-14 RX ORDER — LIDOCAINE 40 MG/G
CREAM TOPICAL
Status: DISCONTINUED | OUTPATIENT
Start: 2018-06-14 | End: 2018-06-15 | Stop reason: HOSPADM

## 2018-06-14 RX ORDER — HYDRALAZINE HYDROCHLORIDE 10 MG/1
10 TABLET, FILM COATED ORAL
Status: DISCONTINUED | OUTPATIENT
Start: 2018-06-15 | End: 2018-06-14

## 2018-06-14 RX ORDER — SODIUM CHLORIDE, SODIUM LACTATE, POTASSIUM CHLORIDE, CALCIUM CHLORIDE 600; 310; 30; 20 MG/100ML; MG/100ML; MG/100ML; MG/100ML
INJECTION, SOLUTION INTRAVENOUS CONTINUOUS PRN
Status: DISCONTINUED | OUTPATIENT
Start: 2018-06-14 | End: 2018-06-15

## 2018-06-14 RX ORDER — HEPARIN SODIUM 1000 [USP'U]/ML
INJECTION, SOLUTION INTRAVENOUS; SUBCUTANEOUS PRN
Status: DISCONTINUED | OUTPATIENT
Start: 2018-06-14 | End: 2018-06-15

## 2018-06-14 RX ORDER — HYDRALAZINE HYDROCHLORIDE 10 MG/1
10 TABLET, FILM COATED ORAL
Status: DISCONTINUED | OUTPATIENT
Start: 2018-06-14 | End: 2018-06-14

## 2018-06-14 RX ORDER — LIDOCAINE HYDROCHLORIDE 20 MG/ML
INJECTION, SOLUTION INFILTRATION; PERINEURAL PRN
Status: DISCONTINUED | OUTPATIENT
Start: 2018-06-14 | End: 2018-06-15

## 2018-06-14 RX ORDER — PROTAMINE SULFATE 10 MG/ML
INJECTION, SOLUTION INTRAVENOUS PRN
Status: DISCONTINUED | OUTPATIENT
Start: 2018-06-14 | End: 2018-06-15

## 2018-06-14 RX ORDER — METHYLPREDNISOLONE SODIUM SUCCINATE 500 MG/8ML
INJECTION INTRAMUSCULAR; INTRAVENOUS PRN
Status: DISCONTINUED | OUTPATIENT
Start: 2018-06-14 | End: 2018-06-15

## 2018-06-14 RX ORDER — PROPOFOL 10 MG/ML
INJECTION, EMULSION INTRAVENOUS PRN
Status: DISCONTINUED | OUTPATIENT
Start: 2018-06-14 | End: 2018-06-15

## 2018-06-14 RX ADMIN — NITROGLYCERIN 25 MCG: 10 INJECTION INTRAVENOUS at 22:58

## 2018-06-14 RX ADMIN — HEPARIN SODIUM 500 UNITS/HR: 1000 INJECTION, SOLUTION INTRAVENOUS; SUBCUTANEOUS at 10:59

## 2018-06-14 RX ADMIN — EPOETIN ALFA 4000 UNITS: 10000 SOLUTION INTRAVENOUS; SUBCUTANEOUS at 10:57

## 2018-06-14 RX ADMIN — HUMAN INSULIN 4 UNITS/HR: 100 INJECTION, SOLUTION SUBCUTANEOUS at 19:02

## 2018-06-14 RX ADMIN — ROCURONIUM BROMIDE 50 MG: 10 INJECTION INTRAVENOUS at 18:30

## 2018-06-14 RX ADMIN — ROCURONIUM BROMIDE 20 MG: 10 INJECTION INTRAVENOUS at 22:00

## 2018-06-14 RX ADMIN — PROPOFOL 30 MCG/KG/MIN: 10 INJECTION, EMULSION INTRAVENOUS at 23:05

## 2018-06-14 RX ADMIN — Medication 1 CAPSULE: at 08:01

## 2018-06-14 RX ADMIN — MIDAZOLAM 1 MG: 1 INJECTION INTRAMUSCULAR; INTRAVENOUS at 22:08

## 2018-06-14 RX ADMIN — PHENYLEPHRINE HYDROCHLORIDE 100 MCG: 10 INJECTION, SOLUTION INTRAMUSCULAR; INTRAVENOUS; SUBCUTANEOUS at 18:00

## 2018-06-14 RX ADMIN — METHYLPREDNISOLONE SODIUM SUCCINATE 500 MG: 500 INJECTION, POWDER, FOR SOLUTION INTRAMUSCULAR; INTRAVENOUS at 21:26

## 2018-06-14 RX ADMIN — PROPOFOL 100 MG: 10 INJECTION, EMULSION INTRAVENOUS at 17:36

## 2018-06-14 RX ADMIN — PROTAMINE SULFATE 150 MG: 10 INJECTION, SOLUTION INTRAVENOUS at 21:58

## 2018-06-14 RX ADMIN — FENTANYL CITRATE 150 MCG: 50 INJECTION, SOLUTION INTRAMUSCULAR; INTRAVENOUS at 22:13

## 2018-06-14 RX ADMIN — AMINOCAPROIC ACID 5 G: 250 INJECTION, SOLUTION INTRAVENOUS at 18:29

## 2018-06-14 RX ADMIN — NITROGLYCERIN 25 MCG: 10 INJECTION INTRAVENOUS at 22:04

## 2018-06-14 RX ADMIN — MIDAZOLAM 1 MG: 1 INJECTION INTRAMUSCULAR; INTRAVENOUS at 19:28

## 2018-06-14 RX ADMIN — CALCIUM CHLORIDE 300 MG: 100 INJECTION, SOLUTION INTRAVENOUS at 22:48

## 2018-06-14 RX ADMIN — LIDOCAINE HYDROCHLORIDE 100 MG: 20 INJECTION, SOLUTION INFILTRATION; PERINEURAL at 17:36

## 2018-06-14 RX ADMIN — ROCURONIUM BROMIDE 50 MG: 10 INJECTION INTRAVENOUS at 19:04

## 2018-06-14 RX ADMIN — ALLOPURINOL 100 MG: 100 TABLET ORAL at 08:01

## 2018-06-14 RX ADMIN — NITROGLYCERIN 25 MCG: 10 INJECTION INTRAVENOUS at 23:05

## 2018-06-14 RX ADMIN — NITROGLYCERIN 25 MCG: 10 INJECTION INTRAVENOUS at 22:47

## 2018-06-14 RX ADMIN — ISOPROTERENOL HYDROCHLORIDE 0.03 MCG/KG/MIN: 0.2 INJECTION, SOLUTION INTRAMUSCULAR; INTRAVENOUS at 21:34

## 2018-06-14 RX ADMIN — VITAMIN B12 0.1 MG ORAL TABLET 100 MCG: 0.1 TABLET ORAL at 08:01

## 2018-06-14 RX ADMIN — MIDAZOLAM 1 MG: 1 INJECTION INTRAMUSCULAR; INTRAVENOUS at 17:21

## 2018-06-14 RX ADMIN — EPINEPHRINE 0.03 MCG/KG/MIN: 1 INJECTION PARENTERAL at 21:47

## 2018-06-14 RX ADMIN — PHENYLEPHRINE HYDROCHLORIDE 100 MCG: 10 INJECTION, SOLUTION INTRAMUSCULAR; INTRAVENOUS; SUBCUTANEOUS at 17:48

## 2018-06-14 RX ADMIN — SODIUM CHLORIDE 1000 MG: 9 INJECTION, SOLUTION INTRAVENOUS at 12:52

## 2018-06-14 RX ADMIN — AMINOCAPROIC ACID 1 G/HR: 250 INJECTION, SOLUTION INTRAVENOUS at 19:29

## 2018-06-14 RX ADMIN — FENTANYL CITRATE 150 MCG: 50 INJECTION, SOLUTION INTRAMUSCULAR; INTRAVENOUS at 18:50

## 2018-06-14 RX ADMIN — IOPAMIDOL 150 ML: 755 INJECTION, SOLUTION INTRAVENOUS at 15:01

## 2018-06-14 RX ADMIN — FENTANYL CITRATE 50 MCG: 50 INJECTION, SOLUTION INTRAMUSCULAR; INTRAVENOUS at 21:49

## 2018-06-14 RX ADMIN — DESMOPRESSIN ACETATE 23.2 MCG: 4 INJECTION INTRAVENOUS at 22:40

## 2018-06-14 RX ADMIN — CALCIUM CHLORIDE 200 MG: 100 INJECTION, SOLUTION INTRAVENOUS at 22:21

## 2018-06-14 RX ADMIN — HEPARIN SODIUM 500 UNITS: 1000 INJECTION, SOLUTION INTRAVENOUS; SUBCUTANEOUS at 10:58

## 2018-06-14 RX ADMIN — NITROGLYCERIN 50 MCG: 10 INJECTION INTRAVENOUS at 22:13

## 2018-06-14 RX ADMIN — FENTANYL CITRATE 200 MCG: 50 INJECTION, SOLUTION INTRAMUSCULAR; INTRAVENOUS at 17:36

## 2018-06-14 RX ADMIN — FENTANYL CITRATE 150 MCG: 50 INJECTION, SOLUTION INTRAMUSCULAR; INTRAVENOUS at 23:17

## 2018-06-14 RX ADMIN — SODIUM CHLORIDE: 9 INJECTION, SOLUTION INTRAVENOUS at 16:57

## 2018-06-14 RX ADMIN — FENTANYL CITRATE 100 MCG: 50 INJECTION, SOLUTION INTRAMUSCULAR; INTRAVENOUS at 22:50

## 2018-06-14 RX ADMIN — MYCOPHENOLATE MOFETIL 1500 MG: 250 CAPSULE ORAL at 12:53

## 2018-06-14 RX ADMIN — PHENYLEPHRINE HYDROCHLORIDE 100 MCG: 10 INJECTION, SOLUTION INTRAMUSCULAR; INTRAVENOUS; SUBCUTANEOUS at 17:45

## 2018-06-14 RX ADMIN — FENTANYL CITRATE 50 MCG: 50 INJECTION, SOLUTION INTRAMUSCULAR; INTRAVENOUS at 17:30

## 2018-06-14 RX ADMIN — HEPARIN SODIUM 31000 UNITS: 1000 INJECTION, SOLUTION INTRAVENOUS; SUBCUTANEOUS at 19:28

## 2018-06-14 RX ADMIN — FENTANYL CITRATE 100 MCG: 50 INJECTION, SOLUTION INTRAMUSCULAR; INTRAVENOUS at 19:30

## 2018-06-14 RX ADMIN — PHENYLEPHRINE HYDROCHLORIDE 100 MCG: 10 INJECTION, SOLUTION INTRAMUSCULAR; INTRAVENOUS; SUBCUTANEOUS at 17:53

## 2018-06-14 RX ADMIN — MIDAZOLAM 1 MG: 1 INJECTION INTRAMUSCULAR; INTRAVENOUS at 22:45

## 2018-06-14 RX ADMIN — FENTANYL CITRATE 200 MCG: 50 INJECTION, SOLUTION INTRAMUSCULAR; INTRAVENOUS at 21:45

## 2018-06-14 RX ADMIN — SODIUM CHLORIDE, POTASSIUM CHLORIDE, SODIUM LACTATE AND CALCIUM CHLORIDE: 600; 310; 30; 20 INJECTION, SOLUTION INTRAVENOUS at 18:40

## 2018-06-14 RX ADMIN — PHENYLEPHRINE HYDROCHLORIDE 100 MCG: 10 INJECTION, SOLUTION INTRAMUSCULAR; INTRAVENOUS; SUBCUTANEOUS at 17:40

## 2018-06-14 RX ADMIN — Medication 100 MG: at 08:01

## 2018-06-14 RX ADMIN — CETIRIZINE HYDROCHLORIDE 10 MG: 10 TABLET, FILM COATED ORAL at 08:01

## 2018-06-14 RX ADMIN — CALCIUM CHLORIDE 200 MG: 100 INJECTION, SOLUTION INTRAVENOUS at 21:48

## 2018-06-14 RX ADMIN — EPINEPHRINE 10 MCG: 1 INJECTION PARENTERAL at 17:37

## 2018-06-14 RX ADMIN — SODIUM CHLORIDE 300 ML: 9 INJECTION, SOLUTION INTRAVENOUS at 10:55

## 2018-06-14 RX ADMIN — EPINEPHRINE 10 MCG: 1 INJECTION PARENTERAL at 17:40

## 2018-06-14 RX ADMIN — CALCIUM CHLORIDE 100 MG: 100 INJECTION, SOLUTION INTRAVENOUS at 22:10

## 2018-06-14 RX ADMIN — ROCURONIUM BROMIDE 50 MG: 10 INJECTION INTRAVENOUS at 18:15

## 2018-06-14 RX ADMIN — SODIUM CHLORIDE 250 ML: 9 INJECTION, SOLUTION INTRAVENOUS at 10:56

## 2018-06-14 RX ADMIN — ROCURONIUM BROMIDE 50 MG: 10 INJECTION INTRAVENOUS at 17:36

## 2018-06-14 RX ADMIN — ROCURONIUM BROMIDE 50 MG: 10 INJECTION INTRAVENOUS at 20:31

## 2018-06-14 RX ADMIN — VANCOMYCIN HYDROCHLORIDE 1000 MG: 1 INJECTION, SOLUTION INTRAVENOUS at 18:25

## 2018-06-14 RX ADMIN — CALCIUM CHLORIDE 200 MG: 100 INJECTION, SOLUTION INTRAVENOUS at 22:31

## 2018-06-14 RX ADMIN — Medication 5 MG: at 22:25

## 2018-06-14 NOTE — TELEPHONE ENCOUNTER
0845:Requested to speak with his MD. Dr. Blum was notified and will go to speak with him. Offer is for HEART ONLY (w/o kidney)    HEART donor was identified by SANA Ayoub and reviewed with Dr. Shearer.  The organ was accepted.  The pt is currently on 4E      Donor UNOS ID [id number] and Match ID [id number] confirmed with Dr. Shearer.      Donor and recipient blood type reviewed and found to be IDENTICAL      Recipient with HLA antibodies: NO  Crossmatch required   Prospective:   Virtual: Completed  Crossmatch reviewed with Dr. Webster, immunology staff on call and deemed negative based on organ specific protocol.     Donor specific antibodies absent, notified Dr. Shearer    Donor meets criteria to be classified as PHS INCREASED RISK; Dr. Blum spoke with recipient in his room and patient is willing to proceed with transplant.      Pt was contacted ON 4E    Verified pt has not had any blood transfusions since their last PRA sample on 04.16.18    Pt Instructed to remain NPO for pre-op prep.      Pt on Coumadin: NO   Intervention: n/a    HEART RECIPIENT: Renal function reviewed, pt IS NOT pre-selected for CNI delaying protocol, Dr. Shearer notified.     ABO/CMV/EBV status note:   UNOS donor ID [id number]  Donor blood type is A: Verified by donor records   Recipient blood type is A-: verified by blood bank [facility].   Donor CMV status is positive. Verified by donor records.   Recipient CMV status is negative. Verified in [facility] lab results.   Donor EBV status is positive Verified by donor records.   Recipient EBV status is pending. Verified in [facility] lab results.   Recipient VZV status is positive. verified in [facility] lab results.

## 2018-06-14 NOTE — PLAN OF CARE
Problem: Patient Care Overview  Goal: Plan of Care/Patient Progress Review  Outcome: No Change  D: Afebrile. Sinus tachycardia. Normotensive. A&Ox4. On RA.  I/A: 10mg oral hydralazine held last night due to soft pressures, discussed with MD. Morning ficks numbers: CVP 9, PA 54/32, SVO2 48, CO 3.8, CI 2, SVR 1448. Dobutamine and dopamine gtts continue to be at 2.5 set rate. Slept well throughout night. Denies SOB.   P: Continue to monitor and assess pt. Consult Cards 2 team with any new changes/concerns.

## 2018-06-14 NOTE — ANESTHESIA PREPROCEDURE EVALUATION
Anesthesia Evaluation     .             ROS/MED HX    ENT/Pulmonary:     (+)sleep apnea, , . .    Neurologic:       Cardiovascular:     (+) hypertension--CAD, --. : . CHF . . :. .       METS/Exercise Tolerance:     Hematologic:         Musculoskeletal:         GI/Hepatic:     (+) GERD       Renal/Genitourinary:     (+) chronic renal disease,       Endo:     (+) type I DM, .      Psychiatric:         Infectious Disease:         Malignancy:         Other:                                    Anesthesia Plan      History & Physical Review  History and physical reviewed and following examination; no interval change.    ASA Status:  4 .    NPO Status:  > 8 hours    Plan for General and ETT with Intravenous induction. Maintenance will be Balanced.    PONV prophylaxis:  Ondansetron (or other 5HT-3)  Additional equipment: 2nd IV, Arterial Line, Central Line, PA Catheter and MARIANA      Postoperative Care  Postoperative pain management:  IV analgesics.      Consents  Anesthetic plan, risks, benefits and alternatives discussed with:  Patient..        Anesthesiology Attending Note    HPI: Murray Nicholson is a 63 year old male who  has a past medical history of (HFpEF) heart failure with preserved ejection fraction (H); Allergic rhinitis, cause unspecified; Anemia of chronic kidney failure; AS (aortic stenosis); Ascending aortic aneurysm (H); Bicuspid aortic valve; CAD (coronary artery disease); Chronic kidney disease, stage 5 (H); Congestive heart failure, unspecified; Dyslipidemia; Esophageal reflux; ESRD (end stage renal disease) (H); Hypersomnia with sleep apnea, unspecified; Hypertension; MGUS (monoclonal gammopathy of unknown significance); Mitral regurgitation; SHEELA (obstructive sleep apnea); Systolic heart failure (H); and Type 2 diabetes mellitus (H).  has a past surgical history that includes Laparoscopic insertion catheter peritoneal dialysis (N/A, 6/22/2017); Remove catheter peritoneal (Right, 1/15/2018); Laparoscopic  herniorrhaphy inguinal bilateral (Bilateral, 7/24/2015); picc insertion (Left, 04/22/2018); Colonoscopy (N/A, 5/3/2018); and Esophagoscopy, gastroscopy, duodenoscopy (EGD), combined (N/A, 5/7/2018). with a systolic heart failure;Heart failure (H);pre-transplant scr* scheduled for Procedure(s):  Heart Transplant.      PMHx/PSHx/ROS:  Past Medical History:   Diagnosis Date     (HFpEF) heart failure with preserved ejection fraction (H)      Allergic rhinitis, cause unspecified      Anemia of chronic kidney failure      AS (aortic stenosis)      Ascending aortic aneurysm (H)      Bicuspid aortic valve      CAD (coronary artery disease)      Chronic kidney disease, stage 5 (H)      Congestive heart failure, unspecified      Dyslipidemia      Esophageal reflux      ESRD (end stage renal disease) (H)      Hypersomnia with sleep apnea, unspecified      Hypertension      MGUS (monoclonal gammopathy of unknown significance)      Mitral regurgitation      SHEELA (obstructive sleep apnea)      Systolic heart failure (H)      Type 2 diabetes mellitus (H)        Past Surgical History:   Procedure Laterality Date     COLONOSCOPY N/A 5/3/2018    Procedure: COLONOSCOPY;  colonoscopy ;  Surgeon: Ammon Castillo MD;  Location: [facility]     ESOPHAGOSCOPY, GASTROSCOPY, DUODENOSCOPY (EGD), COMBINED N/A 5/7/2018    Procedure: COMBINED ENDOSCOPIC ULTRASOUND, ESOPHAGOSCOPY, GASTROSCOPY, DUODENOSCOPY (EGD), FINE NEEDLE ASPIRATE/BIOPSY;  Endoscopic Ultrasound with Fine Needle Aspiration ;  Surgeon: Alon Don MD;  Location: [facility] OR     LAPAROSCOPIC HERNIORRHAPHY INGUINAL BILATERAL Bilateral 7/24/2015    Procedure: LAPAROSCOPIC HERNIORRHAPHY INGUINAL BILATERAL;  Surgeon: Bobby Mcconnell MD;  Location: [facility] OR     LAPAROSCOPIC INSERTION CATHETER PERITONEAL DIALYSIS N/A 6/22/2017    Procedure: LAPAROSCOPIC INSERTION CATHETER PERITONEAL DIALYSIS;  Laparoscopic Peritoneal Dialysis Catheter Placement - Anesthesia with block;  Surgeon:  Esteban Arvizu MD;  Location: [facility] OR     PICC INSERTION Left 04/22/2018    5Fr - 49cm (3cm external), Basilic vein, low SVC     REMOVE CATHETER PERITONEAL Right 1/15/2018    Procedure: REMOVE CATHETER PERITONEAL;  Open Removal of Peritoneal Dialysis Catheter ;  Surgeon: Esteban Arvizu MD;  Location: [facility] OR       Soc Hx:   Social History   Substance Use Topics     Smoking status: Former Smoker     Packs/day: 1.00     Years: 19.00     Types: Cigarettes     Quit date: 8/18/1994     Smokeless tobacco: Never Used     Alcohol use No         Allergies:   Allergies   Allergen Reactions     Norco [Hydrocodone-Acetaminophen] Nausea and Vomiting     Cats      Throat tightness     Isosorbide Other (See Comments)     hypotension     Penicillins Hives     Seasonal Allergies      rhinitis     Shrimp      Throat closes        Meds:   Prescriptions Prior to Admission   Medication Sig Dispense Refill Last Dose     albuterol (PROAIR HFA/PROVENTIL HFA/VENTOLIN HFA) 108 (90 BASE) MCG/ACT Inhaler Inhale 2 puffs into the lungs every 6 hours as needed for shortness of breath / dyspnea or wheezing 1 Inhaler 0 4/15/2018 at Unknown time     allopurinol (ZYLOPRIM) 300 MG tablet Take 1 tablet (300 mg) by mouth daily 30 tablet 2 4/16/2018 at Unknown time     ASPIRIN 81 MG OR TABS Take 1 tablet (81 mg) by mouth at bedtime   4/15/2018 at 2000     B Complex-Biotin-FA (B-COMPLEX PO) Take 1 tablet by mouth daily   4/16/2018 at Unknown time     bismuth subsalicylate (PEPTO BISMOL) 262 MG/15ML suspension Take 15 mLs by mouth every 6 hours as needed for indigestion   More than a month at Unknown time     blood glucose monitoring (ACCU-CHEK FASTCLIX) lancets Use to test blood sugar 2-3 times daily or as directed.  Ok to substitute alternative if insurance prefers. 102 each 11 4/16/2018 at Unknown time     blood glucose monitoring (NO BRAND SPECIFIED) test strip Use to test blood sugar 2-3 times daily or as directed. 100 each 11 4/16/2018 at Unknown time      calcium acetate, Phos Binder, 667 MG TABS Take 1 tablet by mouth 3 times daily (with meals) 180 tablet 3 4/15/2018 at 2000     fluticasone (FLONASE) 50 MCG/ACT spray Spray 1-2 sprays into both nostrils daily 16 g 11 4/15/2018 at 2000     glipiZIDE (GLUCOTROL) 5 MG tablet Take 1 tablet (5 mg) by mouth daily (with breakfast) 30 tablet 1 4/16/2018 at Unknown time     loratadine (CLARITIN) 10 MG tablet Take 10 mg by mouth daily as needed Reported on 5/3/2017   More than a month at Unknown time     midodrine HCl 10 MG TABS Take 1 tablet by mouth three times a week 90 tablet 11 Past Week at Unknown time     omeprazole (PRILOSEC) 20 MG CR capsule Take 20 mg by mouth daily At night   4/15/2018 at 2000     order for DME Equipment being ordered: Carol ()  Treatment Diagnosis: ESRD on PD  Pt has to be connected to PD all night and can not be disconnected, hence impending his mobility to go to the bathroom. At risk for infection if he does not have this equipment. (Patient not taking: Reported on 4/4/2018) 1 Units 0 Not Taking     traMADol (ULTRAM) 50 MG tablet Take 1 tablet (50 mg) by mouth every 6 hours as needed for moderate pain 50 tablet 0 4/15/2018 at 2000       Current Outpatient Prescriptions   Medication Sig Dispense Refill     atorvastatin (LIPITOR) 40 MG tablet TAKE 1 TABLET(40 MG) BY MOUTH DAILY 90 tablet 0         Labs:  BMP:  Recent Labs   Lab Test  06/14/18   0549   NA  134   POTASSIUM  5.2   CHLORIDE  96   CO2  26   BUN  42*   CR  8.01*   GLC  109*   TRINI  9.0     CBC:   Recent Labs   Lab Test  06/14/18   0549   WBC  4.7   RBC  3.07*   HGB  8.9*   HCT  27.7*   MCV  90   MCH  29.0   MCHC  32.1   RDW  17.3*   PLT  202     Coags:  Recent Labs   Lab Test  05/07/18   0649  04/16/18   1546   INR  1.20*  1.09   PTT   --   27     Echocardiogram Interpretation Summary  Evaluation performed under optimal hemodynamic condition, post HD, and with BP 98/68.  Severely (EF <30%) reduced left ventricular function is  present. The Ejection Fraction is estimated at 15-20%.  Global right ventricular function is mildly to moderately reduced.  Moderate under hypovolemic condition and likely severe under usual loading  conditon mitral regurgitation. A single, focused regurgitant jet appears to originate mostly from the A3 and P3 scallops. Mixed etiology mitral  regurgitation [LV dilation with restriction of the posterior leaflet  (Alex IIIB)]. A secondary chordal structure appears disrupted in the A2 and P2 scallop.  Functionally bicuspid aortic valve with fusion of the left and right cusps  (Alex type III.) Severe holodiastolic aortic insufficiency is present. The size of the annulus measures 26.8 mm by 2D and 31.5 mm by 3D. The max and min diameter is 33 and 29.7 mm, respectively. The circumference is 99 mm and the area is 7.7 cm2. There is little to no calcification in the area of the annulus and in the LVOT.    08-FEB-2018 [facility] EKG  Sinus tachycardia  Possible Left atrial enlargement  Left axis deviation  Left ventricular hypertrophy with QRS widening and repolarization abnormality  Cannot rule out Septal infarct , age undetermined  Abnormal ECG  When compared with ECG of 02-FEB-2018 16:58,  Minimal criteria for Septal infarct are now present       Ref. Range 4/2/2018 11:15   FVC-Pred Latest Units: L 3.74   FVC-Pre Latest Units: L 3.27   FVC-%Pred-Pre Latest Units: % 87   FEV1-Pre Latest Units: L 2.60   FEV1-%Pred-Pre Latest Units: % 88   FEV1FVC-Pred Latest Units: % 75   FEV1FVC-Pre Latest Units: % 79   FEV1SVC-Pred Latest Units: % 67   FEV1SVC-Pre Latest Units: % 81   FEV1FEV6-Pred Latest Units: % 79   FEV1FEV6-Pre Latest Units: % 75   FEFMax-Pred Latest Units: L/sec 8.31   FEFMax-Pre Latest Units: L/sec 6.26   FEFMax-%Pred-Pre Latest Units: % 75   FIFMax-Pre Latest Units: L/sec 4.60   ExpTime-Pre Latest Units: sec 7.79       Vital Signs:  T 98  P 107  /70  R 18  SpO2 99%    Anesthetic  Assessment and Plan:    63 year old male who  has a past medical history of (HFpEF) heart failure with preserved ejection fraction (H); Allergic rhinitis, cause unspecified; Anemia of chronic kidney failure; AS (aortic stenosis); Ascending aortic aneurysm (H); Bicuspid aortic valve; CAD (coronary artery disease); Chronic kidney disease, stage 5 (H); Congestive heart failure, unspecified; Dyslipidemia; Esophageal reflux; ESRD (end stage renal disease) (H); Hypersomnia with sleep apnea, unspecified; Hypertension; MGUS (monoclonal gammopathy of unknown significance); Mitral regurgitation; SHEELA (obstructive sleep apnea); Systolic heart failure (H); and Type 2 diabetes mellitus (H). to OR for Procedure(s):  Heart Transplant    Problem list:  HTN  HL  DM  SHEELA  CHF/CM with low EF, Bicuspid AV, AI, MR, Asc AO aneurysm, on Dobutamine gtt  ESRD on HD (past PD) - on RT list  COPD on inhalers  GERD  Pancreatic cyst  Anemia of CD  MGUS    ASA 4  NPO status    Plan for GA, standard monitors, large bore IVs, invasive monitors, intraoperative MARIANA, possible blood transfusion, postoperative ICU.  PONV prophylaxis. Antibiotics and anti-rejection medications per primary service  Anesthetic risks discussed with patient prior to procedure.    Christine Decker MD  Anesthesiology Attending  06/14/18

## 2018-06-14 NOTE — PROGRESS NOTES
Transfer at 1645  Transferred from: 4E  Via: bed  Reason for transfer: Pt was transferred to the OR for heart transplantation.   Family: Aware of transfer; with pt at bedside.   Belongings: Left in room.   Chart: Sent along with pt   Staff witnessed signature of informed consent form and pt verbalized understanding the procedure. Dobutamine/Dopamine gtt are still infusing, each at 2.5mcg/kg/min. Pt's vitals are stable, O2 saturations WDL on room air. Pt was given steroid and cellcept prior to transfer, vancomycin was sent along with pt--to be administered in OR.   Pt status: Pt has been NPO since 0745 this AM.

## 2018-06-14 NOTE — PROGRESS NOTES
Transplant Social Work Services Progress Note      Collaborated with: Murray     Data: Murray paged SW reporting that he has an offer for heart transplant alone.   Intervention: Met with Murray for supportive visit.   Assessment: Murray reports that he has already spoken with Dr. Blum about the heart alone. He is expecting a phone call between 10-11am from Social Security to do his Medicare interview. The Financial Counselor is on her way to help him with this interview. He reports that he's been in communication with his sons and they'll be coming in later. Discussed the need for a caregiver post hospitalization. Murray reports that he was hopeful for the kidney to come with, but understands he'll need to continue with outpatient dialysis.   Education provided by SW: Caregiver plan & availability of assisting with FMLA paperwork for sons as needed.   Plan:    Discharge Plans in Progress: pending medical stability     Barriers to d/c plan: possible heart transplant today.    Follow up Plan: SW will continue to follow for support as needed.    Pager [phone]

## 2018-06-14 NOTE — ANESTHESIA PROCEDURE NOTES
Arterial Line Procedure Note  Staff:     Anesthesiologist:  SRINI BLISS    Resident/CRNA:  KALPESH JETER    Arterial line performed by resident/CRNA in presence of a teaching physician    Location: In OR Before Induction  Procedure Start/Stop Times:      6/14/2018 5:30 PM     6/14/2018 5:37 PM    patient identified, IV checked, site marked, risks and benefits discussed, informed consent, monitors and equipment checked, pre-op evaluation and at physician/surgeon's request      Correct Patient: Yes      Correct Position: Yes      Correct Site: Yes      Correct Procedure: Yes      Correct Laterality:  Yes    Site Marked:  Yes  Line Placement:     Procedure:  Arterial Line    Insertion Site:  Radial    Insertion laterality:  Left    Skin Prep: Chloraprep      Patient Prep: patient draped, mask, sterile gloves, hat and hand hygiene      Local skin infiltration:  1% lidocaine    amount (mL):  2    Ultrasound Guided?: Yes      Artery evaluated via ultrasound confirming patency.   Using realtime imaging, the artery was punctured and the needle was observed entering the artery.      A permanent image is entered into patient's chart.      Catheter size:  20 gauge, 12 cm    Cath secured with: suture      Dressing:  Tegaderm    Complications:  None obvious    Arterial waveform: Yes      IBP within 10% of NIBP: Yes

## 2018-06-14 NOTE — PROGRESS NOTES
Cardiology Progress Note 06/14/2018    Assessment & Plan   63 year old male with a PMH of CAD, ICM (EF of 15-20%), ESRD, bicuspid aortic valve with AI, severe MR, and proximal AAA who was admitted for decompensated heart failure. He is on dobutamine 2.5 mcg/kg/min and listed for heart/kidney transplant as status 1A.    Major Changes:  - transplant heart today  - SG position is now left PA  - CT chest abd pelvis for surgery with contrast per CVTS    # Stage D NYHA Class III systolic heart failure secondary to ICM, listed 1A  # Severe mitral regurgitation  # Severe aortic insufficiency due to bicuspid Ao valve  # Non-sustained ventricular tachycardia (minimal)  - dobutamine 2.5 mcg/kg/min, dopamine 2.5 mcg/kg/min  Holding afterload meds for now   - hydralazine 25 mg q6 hours on non-dialysis days + 10 mg BID on dialysis days (after HD)   - isordil 10 mg TID on non-HD days  - weekly CXR for [place] position(SG placed 5/13)  - nephrology challenging dry weight  - PCW 16 PVR 2.8 QUINONES after hemodialysis       # Chronic Medical Issues:  - Seborrheic keratoses / Dermatofibromas / Multiple benign nevi / Nummular dermatitis: derm consulted, all lesions benign  - Hx of allergic rhinitis: cetrizine  - ESRD: Dialysis T, Th, Sa   - Non-obstructive CAD, Anomalous RCA: ASA 81 mg, atorvastatin 40 mg daily  - Ascending aortic aneurysm: 4.5 x 4.5 cm proximal ascending aortic aneurysm seen on MRI 2/14  - Multiple benign branch duct-IPMNs: no concerning features affecting transplant candidacy  - gout: allopurinol, prednisone 6/26 - 5/31 (tapered for acute flare)      FEN: regular diet, 2L FR  PROPHY: ambulation  LINES: [place] cath  DISPO: TBD  CODE STATUS:  Full    Case discussed with Dr. Blum.    Tunde Coffey  Cardiology Fellow      Interval History   No acute events   afebrile BP MAP 75-80  On 2.5 dobutamine and 2.5 dopamine     Physical Exam   Temp: 98  F (36.7  C) Temp src: Oral BP: 117/81   Heart Rate: 111 Resp: 16 SpO2: 99 % O2  "Device: None (Room air)    Vitals:    06/13/18 0800 06/14/18 0746 06/14/18 1040   Weight: 76.9 kg (169 lb 10.3 oz) 78 kg (171 lb 15.3 oz) 78 kg (171 lb 15.3 oz)     Wt Readings from Last 2 Encounters:   06/14/18 78 kg (171 lb 15.3 oz)   04/16/18 76.9 kg (169 lb 9.6 oz)       Vital Signs with Ranges  Temp:  [97.9  F (36.6  C)-98.1  F (36.7  C)] 98  F (36.7  C)  Heart Rate:  [105-112] 111  Resp:  [16-18] 16  BP: ()/(55-81) 117/81  SpO2:  [99 %-100 %] 99 %  I/O last 3 completed shifts:  In: 966.4 [P.O.:580; I.V.:386.4]  Out: -     SVR 3.5 6/9    Heart Rate: 111, Blood pressure 117/81, pulse 107, temperature 98  F (36.7  C), temperature source Oral, resp. rate 16, height 1.727 m (5' 8\"), weight 78 kg (171 lb 15.3 oz), SpO2 99 %.  171 lbs 15.34 oz  GEN:  Alert, oriented x 3, appears comfortable, NAD.  CV:  Regular rate and rhythm, no murmur no JVD  LUNGS:  Clear to auscultation bilaterally   ABD:  Active bowel sounds, soft, non-tender/non-distended.  No rebound/guarding/rigidity.  EXT:  No edema or cyanosis.    SKIN SG sheath site without redness. No sign of infection    Medications     DOBUTamine 2.5 mcg/kg/min (06/14/18 0800)     DOPamine 2.5 mcg/kg/min (06/14/18 0800)     heparin (porcine) 500 Units/hr (06/14/18 1059)     - MEDICATION INSTRUCTIONS -       Reason ACE/ARB/ARNI order not selected       Reason beta blocker order not selected         albuterol  2.5 mg Nebulization At Bedtime     allopurinol  100 mg Oral Daily     atorvastatin  40 mg Oral Daily     cetirizine  10 mg Oral Daily     cyanocolbalamin  100 mcg Oral Daily     fluticasone  1-2 spray Both Nostrils Daily     gelatin absorbable  1 each Topical During Hemodialysis (from stock)     pump prime ([facility] formula) solution   PERFUSION Once     heparin in saline (CELL SAVER) formula   PERFUSION Once     heparin  3 mL Intracatheter During Hemodialysis (from stock)     heparin  3 mL Intracatheter During Hemodialysis (from stock)     insulin aspart   " Subcutaneous QAM AC     insulin aspart   Subcutaneous Daily with lunch     insulin aspart   Subcutaneous Daily with supper     insulin aspart  1-10 Units Subcutaneous TID AC     insulin aspart  1-7 Units Subcutaneous At Bedtime     insulin glargine  10 Units Subcutaneous QAM AC     hot shot cardioplegia ([facility] formula)   PERFUSION Once     methylPREDNISolone  1,000 mg Intravenous Once     mycophenolate  1,500 mg Oral Q12H     NEPHROCAPS  1 capsule Oral Daily     omeprazole  20 mg Oral Daily     sodium chloride (PF)  10 mL Intracatheter Q7 Days     sodium chloride (PF)  3 mL Intracatheter During Hemodialysis (from stock)     sodium chloride (PF)  3 mL Intracatheter During Hemodialysis (from stock)     sodium chloride (PF)  3 mL Intravenous Q8H     sodium chloride (PF)  3 mL Intracatheter Q8H     vitamin  B-1  100 mg Oral Daily     vancomycin (VANCOCIN) IV  1,000 mg Intravenous Pre-Op/Pre-procedure x 1 dose     vancomycin (VANCOCIN) IV  1,000 mg Intravenous See Admin Instructions       Data     Recent Labs  Lab 06/14/18  0549 06/13/18  0608 06/12/18  0435  06/10/18  2144   WBC 4.7 5.2  --   --  5.0   HGB 8.9* 9.2*  --   --  9.8*   MCV 90 91  --   --  91    200  --   --  244    134 129*  < > 128*   POTASSIUM 5.2 4.5 5.0  < > 4.9   CHLORIDE 96 97 94  < > 92*   CO2 26 29 23  < > 25   BUN 42* 33* 53*  < > 35*   CR 8.01* 5.72* 8.51*  < > 6.13*   ANIONGAP 12 9 12  < > 10   TRINI 9.0 8.7 9.1  < > 8.7   * 95 106*  < > 181*   ALBUMIN  --   --   --   --  2.7*   PROTTOTAL  --   --   --   --  6.8   BILITOTAL  --   --   --   --  0.7   ALKPHOS  --   --   --   --  121   ALT  --   --   --   --  16   AST  --   --   --   --  16   < > = values in this interval not displayed.    No results found for this or any previous visit (from the past 24 hour(s)).      I/O last 3 completed shifts:  In: 966.4 [P.O.:580; I.V.:386.4]  Out: -       LINES/TUBES:  Data      PICC Double Lumen 04/22/18 Left Basilic (Active)   Site  Assessment [facility] 6/14/2018  7:48 AM   External Cath Length (cm) 4 cm 5/28/2018 12:00 AM   Extremity Circumference (cm) 34 cm 5/4/2018  2:00 PM   Dressing Intervention Chlorhexidine patch;Transparent 6/14/2018  4:00 AM   Extravasation? No 6/14/2018  7:48 AM   Dressing Change Due 06/17/18 6/14/2018  7:48 AM   PICC Comment CDI 6/12/2018  4:00 PM   PICC Lumen Assessment Trigger [place];Purple 6/12/2018  4:00 AM   Number of days:53       CVC Single Lumen 05/13/18 Left Internal jugular (Active)   Site Assessment [facility] 6/14/2018  7:48 AM   Line Status Saline locked 6/14/2018  7:48 AM   Dressing Intervention Chlorhexidine sponge;Transparent 6/14/2018  4:00 AM   Extravasation? No 6/14/2018  7:48 AM   Dressing Change Due 06/17/18 6/14/2018  7:48 AM   CVC Comment WINDY 6/14/2018  7:48 AM   Number of days:32       CVC Double Lumen 04/17/18 Right Internal jugular (Active)   Site Assessment [facility] 6/14/2018  7:48 AM   Lumen Soln/Vol REFERENCE 2.1A/2.1V 6/12/2018  3:41 PM   External Cath Length (cm) 3.5 cm 5/26/2018  2:25 PM   Extravasation? No 6/13/2018  4:00 PM   Dressing Intervention Chlorhexidine sponge;Transparent 6/14/2018  4:00 AM   Dressing Change Due 06/17/18 6/14/2018  7:48 AM   CVC Comment CDI 6/13/2018  4:00 PM   CVC Lumen Assessment Red;Blue 6/12/2018  3:41 PM   Number of days:58       Pulmonary Artery Catheter - Single Lumen 05/13/18 1700 Left internal jugular vein (Active)   Dressing dressing dry and intact 6/14/2018  7:48 AM   Securement secured with sutures 6/14/2018  7:48 AM   PA Catheter Markings (cm) 70 6/14/2018  7:48 AM   Phlebitis 0-->no symptoms 6/14/2018  7:48 AM   Infiltration 0-->no symptoms 6/14/2018  7:48 AM   Waveform normal 6/14/2018  7:48 AM   Pressure Catheter Interventions line leveled/zeroed 6/14/2018  7:48 AM   Daily Review Of Necessity completed 6/14/2018  7:48 AM   Number of days:32

## 2018-06-14 NOTE — PROGRESS NOTES
"  Nephrology Progress Note  06/14/2018         Assessment & Recommendations:   Murray Nicholson is a 63 year old year old male with PMH of HTN, DMII, functionally bicuspid aortic valve, CHF/CM (EF 10-15%), ESRD, admitted with worsening dyspnea/SOB, now on dobutamine and dopamine pending heart/kidney transplant.        ESRD: due to DM, on PD since Aug 2017, changed to HD 1/18, now TTS, at [facility] under care Dr Mcpherson, [facility] tunnel, EDW 75.5-76 kg, 3.5 hrs.   -- HD scheduled TTS   -- low dose heparin HD.   -- heparin lock CVC,     BP and volume:  EDW  75.5-76 kg. CVP 9     Anemia: cont venofer 100 mg Qtues, cont Epo 4000 u with dialysis.       BMD: Ca 9.1, alb 2.7, phos 4.9       Severe AV and MR/ cardiomyopathy EF 15%: listed for heart kidney tx. On Dobutamine/Dopamine infusions  - Heart transplant this afternoon      Kristi Armas PA-C  Division of Kidney Disease  Pager [phone]      Interval History :   No new concerns; heart transplant this afternoon    Review of Systems:   I reviewed the following systems:  GI: Has good appetite  Neuro:  no confusion  Constitutional:  no fever or chills  CV: denies dyspnea or CP.     Physical Exam:   I/O last 3 completed shifts:  In: 966.4 [P.O.:580; I.V.:386.4]  Out: -    /82  Pulse 107  Temp 98  F (36.7  C) (Oral)  Resp 16  Ht 1.727 m (5' 8\")  Wt 78 kg (171 lb 15.3 oz)  SpO2 99%  BMI 26.15 kg/m2     GENERAL APPEARANCE: alert, NAD  EYES:  no scleral icterus, pupils equal  PULM: lungs CTA.    CV: Tachy     -edema trace  GI: soft, NT, non distended  INTEGUMENT: no cyanosis or rash  NEURO:  Alert/oriented  Access - Right TDC    Labs:   All labs reviewed by me  Electrolytes/Renal -   Recent Labs   Lab Test  06/14/18   0549  06/13/18   0608  06/12/18   0435   06/10/18   2144   NA  134  134  129*   < >  128*   POTASSIUM  5.2  4.5  5.0   < >  4.9   CHLORIDE  96  97  94   < >  92*   CO2  26  29  23   < >  25   BUN  42*  33*  53*   < >  35*   CR  8.01*  5.72*  8.51*   " < >  6.13*   GLC  109*  95  106*   < >  181*   TRINI  9.0  8.7  9.1   < >  8.7   MAG  2.4*  1.8  2.6*   < >  2.7*   PHOS  5.6*  4.6*   --    --   4.9*    < > = values in this interval not displayed.       CBC -   Recent Labs   Lab Test  06/14/18   0549  06/13/18   0608  06/10/18   2144   WBC  4.7  5.2  5.0   HGB  8.9*  9.2*  9.8*   PLT  202  200  244       LFTs -   Recent Labs   Lab Test  06/10/18   2144  04/16/18   1546  03/07/18   0833  02/19/18   1558   ALKPHOS  121  123  129  102   BILITOTAL  0.7  0.8  0.7  0.6   ALT  16  22  21  15   AST  16  17  18  18   PROTTOTAL  6.8  7.4   --   7.2   ALBUMIN  2.7*  3.5   --   2.7*       Iron Panel -   Recent Labs   Lab Test  05/08/18   0954  07/19/17   1306  07/05/17   1204   IRON  58  46  26*   IRONSAT  27  18  12*   ALBERTINA  621*  369  542*         Imaging:  Exam: XR CHEST PORT 1 VW, 6/6/2018 11:02 AM     Indication: SG cath position check weekly;      Comparison: Radiograph the chest 5/28/2018     Findings:   AP view of the chest. Right IJ central venous catheter with the tip in  the high right atrium. Left IJ [place]ax catheter with the tip over  the main pulmonary artery. Left PICC tip projects over the mid SVC.  Cardiomediastinal silhouette is stable enlarged. Pulmonary vasculature  is distinct but with mild perihilar opacities. Mild bibasilar  opacities. No new focal airspace opacity. No pneumothorax or pleural  effusion. Upper abdomen is unremarkable.         Impression:   1. Left IJ [place]-Jax catheter projects with the tip over the main  pulmonary artery. Remaining support devices are stable.  2. Mild bibasilar atelectasis and pulmonary vascular congestion.     I have personally reviewed the examination and initial interpretation  and I agree with the findings.     KAYLIN AIKEN MD ([place])    Current Medications:    albuterol  2.5 mg Nebulization At Bedtime     allopurinol  100 mg Oral Daily     aminocaproic acid (AMICAR) bolus 5 g  5 g Intravenous Once     atorvastatin   40 mg Oral Daily     cetirizine  10 mg Oral Daily     cyanocolbalamin  100 mcg Oral Daily     fluticasone  1-2 spray Both Nostrils Daily     gelatin absorbable  1 each Topical During Hemodialysis (from [facility])     pump prime ([facility] formula) solution   PERFUSION Once     heparin in saline (CELL SAVER) formula   PERFUSION Once     heparin  3 mL Intracatheter During Hemodialysis (from stock)     heparin  3 mL Intracatheter During Hemodialysis (from stock)     insulin aspart   Subcutaneous QAM AC     insulin aspart   Subcutaneous Daily with lunch     insulin aspart   Subcutaneous Daily with supper     insulin aspart  1-10 Units Subcutaneous TID AC     insulin aspart  1-7 Units Subcutaneous At Bedtime     insulin glargine  10 Units Subcutaneous QAM AC     hot shot cardioplegia ([facility] formula)   PERFUSION Once     methylPREDNISolone  1,000 mg Intravenous Once     mycophenolate  1,500 mg Oral Q12H     NEPHROCAPS  1 capsule Oral Daily     omeprazole  20 mg Oral Daily     sodium chloride (PF)  10 mL Intracatheter Q7 Days     sodium chloride (PF)  3 mL Intracatheter During Hemodialysis (from stock)     sodium chloride (PF)  3 mL Intracatheter During Hemodialysis (from stock)     sodium chloride (PF)  3 mL Intravenous Q8H     sodium chloride (PF)  3 mL Intracatheter Q8H     vitamin  B-1  100 mg Oral Daily     vancomycin (VANCOCIN) IV  1,000 mg Intravenous Pre-Op/Pre-procedure x 1 dose     vancomycin (VANCOCIN) IV  1,000 mg Intravenous See Admin Instructions       aminocaproic acid (AMICAR) infusion ADULT       DOBUTamine 2.5 mcg/kg/min (06/14/18 0800)     DOPamine 2.5 mcg/kg/min (06/14/18 0800)     EPINEPHrine IV infusion ADULT       heparin (porcine) 500 Units/hr (06/14/18 1059)     - MEDICATION INSTRUCTIONS -       norepinephrine       Reason ACE/ARB/ARNI order not selected       Reason beta blocker order not selected       Kristi Armas PA-C

## 2018-06-14 NOTE — PROGRESS NOTES
HEMODIALYSIS TREATMENT NOTE    Date: 6/14/2018  Time: 2:06 PM    Data:  Pre Wt: 78. kg (173 lb 11.6 oz)   Desired Wt: 75.5 kg   Post Wt: 76.6 kg (168 lb 14 oz)  Weight gain: -2.5 kg   Weight change: 2.5 kg  Ultrafiltration - Post Run Net Total Removed (mL): 2500 mL  Ultrafiltration - Post Run Net Total Gain (mL): 0 mL  Vascular Access Status: Yes, secured and visible  Dialyzer Rinse: Light, Streaked  Total Blood Volume Processed: 67.1  Total Dialysis (Treatment) Time:      Lab:   yes    Interventions:Assessments  Pt dialyzed today for 3.5hrs on a K-2 Ca-2.5 bath via RCVC. 2.5kgs of UF. VSS throughout. See MAR for meds given. See Epic for further details. Report to primary RN Edwige on 4E.     Plan:    Per renal

## 2018-06-14 NOTE — PROGRESS NOTES
"SPIRITUAL HEALTH SERVICES  SPIRITUAL ASSESSMENT Progress Note  [facility] 4E     Brief encounter with pt as he was leaving unit to go to OR for transplant surgery. Pt in good spirits, said \"I'm on my way to get my ribs cracked open - I'm getting a heart!\"    I accompanied pt's sons and friend Naresh to 3rd floor Surgery Waiting Area, helped them get settled there.    PLAN:  Will follow-up with pt on Friday.    Navid Pearl) Pia Alonso M.Div., [facility]  Staff   Pager [phone]      "

## 2018-06-14 NOTE — PROGRESS NOTES
PATHOLOGY HLA CROSSMATCH CONSULTATION: DONOR/RECIPIENT  VIRTUAL CROSSMATCH - Heart  Consultation Date: 2018  Consultation Requested by: Dr. Luis  Regarding: Compatibility of  donor organ UNOS #[id number] from OPO/Hospital: [facility]  with Murray Nicholson      Findings: Regarding a virtual crossmatch between Murray Nicholson and  donor listed above (match ID [id number]):  The most recent 4.16.18) and 5 additional patient serum/sera  were analyzed.  The patient has no antibodies listed with HLA specificity against the donor organ.      Record Review Indicates: I personally reviewed the most recent serum, the historic peak sera, and all other sera with solid-phase HLA Single Antigen test results:  The patient has no HLA antibodies against the donor organ.     The results of this virtual XM are:   -most recent serum: compatible   -peak #1: compatible  -peak #2: compatible    Disclaimer: Clinical judgement must take into account other factors, such as non-HLA antibodies not detected in the assay. The VXM gives probabilities only.  The probability does not account for the potential for auto-antibodies that may be present in the patient's serum.  These autoantibodies may render the physical crossmatch falsely positive, and would be detected by an autologous crossmatch.  When possible, confirm findings with prospective allogeneic and autologous flow crossmatches before going to transplant as clinically indicated.     Shyanne Webster MD  Medical Director, Immunology/Histocompatibility Laboratory

## 2018-06-15 ENCOUNTER — APPOINTMENT (OUTPATIENT)
Dept: GENERAL RADIOLOGY | Facility: CLINIC | Age: 63
DRG: 001 | End: 2018-06-15
Attending: THORACIC SURGERY (CARDIOTHORACIC VASCULAR SURGERY)
Payer: COMMERCIAL

## 2018-06-15 ENCOUNTER — RESULTS ONLY (OUTPATIENT)
Dept: CARDIOLOGY | Facility: CLINIC | Age: 63
End: 2018-06-15

## 2018-06-15 ENCOUNTER — APPOINTMENT (OUTPATIENT)
Dept: GENERAL RADIOLOGY | Facility: CLINIC | Age: 63
DRG: 001 | End: 2018-06-15
Attending: INTERNAL MEDICINE
Payer: COMMERCIAL

## 2018-06-15 PROBLEM — Z94.1 HEART TRANSPLANTED (H): Status: ACTIVE | Noted: 2018-06-15

## 2018-06-15 LAB
ALBUMIN SERPL-MCNC: 2.8 G/DL (ref 3.4–5)
ALBUMIN SERPL-MCNC: 3 G/DL (ref 3.4–5)
ALBUMIN SERPL-MCNC: 3 G/DL (ref 3.4–5)
ALBUMIN SERPL-MCNC: 3.2 G/DL (ref 3.4–5)
ALBUMIN SERPL-MCNC: 3.2 G/DL (ref 3.4–5)
ALP SERPL-CCNC: 81 U/L (ref 40–150)
ALP SERPL-CCNC: 83 U/L (ref 40–150)
ALP SERPL-CCNC: 83 U/L (ref 40–150)
ALP SERPL-CCNC: 88 U/L (ref 40–150)
ALP SERPL-CCNC: 93 U/L (ref 40–150)
ALT SERPL W P-5'-P-CCNC: 25 U/L (ref 0–70)
ALT SERPL W P-5'-P-CCNC: 26 U/L (ref 0–70)
ALT SERPL W P-5'-P-CCNC: 28 U/L (ref 0–70)
ALT SERPL W P-5'-P-CCNC: 28 U/L (ref 0–70)
ALT SERPL W P-5'-P-CCNC: 29 U/L (ref 0–70)
ANION GAP SERPL CALCULATED.3IONS-SCNC: 12 MMOL/L (ref 3–14)
ANION GAP SERPL CALCULATED.3IONS-SCNC: 13 MMOL/L (ref 3–14)
ANION GAP SERPL CALCULATED.3IONS-SCNC: 13 MMOL/L (ref 3–14)
ANION GAP SERPL CALCULATED.3IONS-SCNC: 15 MMOL/L (ref 3–14)
ANION GAP SERPL CALCULATED.3IONS-SCNC: 18 MMOL/L (ref 3–14)
APTT PPP: 33 SEC (ref 22–37)
APTT PPP: 38 SEC (ref 22–37)
AST SERPL W P-5'-P-CCNC: 100 U/L (ref 0–45)
AST SERPL W P-5'-P-CCNC: 103 U/L (ref 0–45)
AST SERPL W P-5'-P-CCNC: 105 U/L (ref 0–45)
AST SERPL W P-5'-P-CCNC: 107 U/L (ref 0–45)
AST SERPL W P-5'-P-CCNC: 82 U/L (ref 0–45)
BASE DEFICIT BLDA-SCNC: 0.7 MMOL/L
BASE DEFICIT BLDA-SCNC: 1.9 MMOL/L
BASE DEFICIT BLDA-SCNC: 1.9 MMOL/L
BASE DEFICIT BLDA-SCNC: 2.7 MMOL/L
BASE DEFICIT BLDA-SCNC: 2.7 MMOL/L
BASE DEFICIT BLDA-SCNC: 2.8 MMOL/L
BASE DEFICIT BLDV-SCNC: 2.6 MMOL/L
BASE DEFICIT BLDV-SCNC: 2.7 MMOL/L
BASE DEFICIT BLDV-SCNC: 6.3 MMOL/L
BASE EXCESS BLDA CALC-SCNC: 0.5 MMOL/L
BASE EXCESS BLDA CALC-SCNC: 1.3 MMOL/L
BASE EXCESS BLDA CALC-SCNC: 1.3 MMOL/L
BASE EXCESS BLDA CALC-SCNC: 1.5 MMOL/L
BASE EXCESS BLDA CALC-SCNC: 1.5 MMOL/L
BASE EXCESS BLDA CALC-SCNC: 1.9 MMOL/L
BASE EXCESS BLDA CALC-SCNC: 2.9 MMOL/L
BASE EXCESS BLDA CALC-SCNC: 3.2 MMOL/L
BASE EXCESS BLDA CALC-SCNC: 3.4 MMOL/L
BASE EXCESS BLDA CALC-SCNC: 4.9 MMOL/L
BASOPHILS # BLD AUTO: 0 10E9/L (ref 0–0.2)
BASOPHILS NFR BLD AUTO: 0 %
BASOPHILS NFR BLD AUTO: 0.1 %
BILIRUB SERPL-MCNC: 1.8 MG/DL (ref 0.2–1.3)
BILIRUB SERPL-MCNC: 2.5 MG/DL (ref 0.2–1.3)
BILIRUB SERPL-MCNC: 2.7 MG/DL (ref 0.2–1.3)
BILIRUB SERPL-MCNC: 2.8 MG/DL (ref 0.2–1.3)
BILIRUB SERPL-MCNC: 3.2 MG/DL (ref 0.2–1.3)
BLD PROD TYP BPU: NORMAL
BLD PROD TYP BPU: NORMAL
BLD UNIT ID BPU: 0
BLD UNIT ID BPU: 0
BLOOD PRODUCT CODE: NORMAL
BLOOD PRODUCT CODE: NORMAL
BPU ID: NORMAL
BPU ID: NORMAL
BUN SERPL-MCNC: 19 MG/DL (ref 7–30)
BUN SERPL-MCNC: 20 MG/DL (ref 7–30)
BUN SERPL-MCNC: 21 MG/DL (ref 7–30)
BUN SERPL-MCNC: 21 MG/DL (ref 7–30)
BUN SERPL-MCNC: 23 MG/DL (ref 7–30)
CA-I BLD-MCNC: 3.3 MG/DL (ref 4.4–5.2)
CA-I BLD-MCNC: 4 MG/DL (ref 4.4–5.2)
CA-I BLD-MCNC: 4.3 MG/DL (ref 4.4–5.2)
CA-I BLD-MCNC: 4.4 MG/DL (ref 4.4–5.2)
CA-I BLD-MCNC: 4.5 MG/DL (ref 4.4–5.2)
CA-I BLD-MCNC: 4.8 MG/DL (ref 4.4–5.2)
CA-I BLD-MCNC: 4.8 MG/DL (ref 4.4–5.2)
CA-I BLD-MCNC: 4.9 MG/DL (ref 4.4–5.2)
CA-I BLD-MCNC: 5.1 MG/DL (ref 4.4–5.2)
CA-I SERPL ISE-MCNC: 5 MG/DL (ref 4.4–5.2)
CALCIUM SERPL-MCNC: 10 MG/DL (ref 8.5–10.1)
CALCIUM SERPL-MCNC: 9 MG/DL (ref 8.5–10.1)
CALCIUM SERPL-MCNC: 9.3 MG/DL (ref 8.5–10.1)
CALCIUM SERPL-MCNC: 9.4 MG/DL (ref 8.5–10.1)
CALCIUM SERPL-MCNC: 9.5 MG/DL (ref 8.5–10.1)
CHLORIDE SERPL-SCNC: 100 MMOL/L (ref 94–109)
CHLORIDE SERPL-SCNC: 100 MMOL/L (ref 94–109)
CHLORIDE SERPL-SCNC: 101 MMOL/L (ref 94–109)
CHLORIDE SERPL-SCNC: 96 MMOL/L (ref 94–109)
CHLORIDE SERPL-SCNC: 97 MMOL/L (ref 94–109)
CMV IGG SERPL QL IA: 0.2 AI (ref 0–0.8)
CO2 SERPL-SCNC: 22 MMOL/L (ref 20–32)
CO2 SERPL-SCNC: 22 MMOL/L (ref 20–32)
CO2 SERPL-SCNC: 23 MMOL/L (ref 20–32)
CO2 SERPL-SCNC: 23 MMOL/L (ref 20–32)
CO2 SERPL-SCNC: 24 MMOL/L (ref 20–32)
CREAT SERPL-MCNC: 2.94 MG/DL (ref 0.66–1.25)
CREAT SERPL-MCNC: 3.14 MG/DL (ref 0.66–1.25)
CREAT SERPL-MCNC: 3.53 MG/DL (ref 0.66–1.25)
CREAT SERPL-MCNC: 4.13 MG/DL (ref 0.66–1.25)
CREAT SERPL-MCNC: 4.5 MG/DL (ref 0.66–1.25)
DIFFERENTIAL METHOD BLD: ABNORMAL
EBV VCA IGG SER QL IA: >8 AI (ref 0–0.8)
EOSINOPHIL # BLD AUTO: 0 10E9/L (ref 0–0.7)
EOSINOPHIL NFR BLD AUTO: 0 %
ERYTHROCYTE [DISTWIDTH] IN BLOOD BY AUTOMATED COUNT: 17.2 % (ref 10–15)
ERYTHROCYTE [DISTWIDTH] IN BLOOD BY AUTOMATED COUNT: 17.6 % (ref 10–15)
ERYTHROCYTE [DISTWIDTH] IN BLOOD BY AUTOMATED COUNT: 17.7 % (ref 10–15)
ERYTHROCYTE [DISTWIDTH] IN BLOOD BY AUTOMATED COUNT: 17.8 % (ref 10–15)
ERYTHROCYTE [DISTWIDTH] IN BLOOD BY AUTOMATED COUNT: 17.8 % (ref 10–15)
GFR SERPL CREATININE-BSD FRML MDRD: 13 ML/MIN/1.7M2
GFR SERPL CREATININE-BSD FRML MDRD: 15 ML/MIN/1.7M2
GFR SERPL CREATININE-BSD FRML MDRD: 18 ML/MIN/1.7M2
GFR SERPL CREATININE-BSD FRML MDRD: 20 ML/MIN/1.7M2
GFR SERPL CREATININE-BSD FRML MDRD: 22 ML/MIN/1.7M2
GLUCOSE BLD-MCNC: 172 MG/DL (ref 70–99)
GLUCOSE BLD-MCNC: 173 MG/DL (ref 70–99)
GLUCOSE BLD-MCNC: 180 MG/DL (ref 70–99)
GLUCOSE BLD-MCNC: 187 MG/DL (ref 70–99)
GLUCOSE BLD-MCNC: 200 MG/DL (ref 70–99)
GLUCOSE BLD-MCNC: 213 MG/DL (ref 70–99)
GLUCOSE BLD-MCNC: 224 MG/DL (ref 70–99)
GLUCOSE BLD-MCNC: 245 MG/DL (ref 70–99)
GLUCOSE BLD-MCNC: 255 MG/DL (ref 70–99)
GLUCOSE BLDC GLUCOMTR-MCNC: 114 MG/DL (ref 70–99)
GLUCOSE BLDC GLUCOMTR-MCNC: 115 MG/DL (ref 70–99)
GLUCOSE BLDC GLUCOMTR-MCNC: 117 MG/DL (ref 70–99)
GLUCOSE BLDC GLUCOMTR-MCNC: 120 MG/DL (ref 70–99)
GLUCOSE BLDC GLUCOMTR-MCNC: 143 MG/DL (ref 70–99)
GLUCOSE BLDC GLUCOMTR-MCNC: 143 MG/DL (ref 70–99)
GLUCOSE BLDC GLUCOMTR-MCNC: 149 MG/DL (ref 70–99)
GLUCOSE BLDC GLUCOMTR-MCNC: 151 MG/DL (ref 70–99)
GLUCOSE BLDC GLUCOMTR-MCNC: 152 MG/DL (ref 70–99)
GLUCOSE BLDC GLUCOMTR-MCNC: 154 MG/DL (ref 70–99)
GLUCOSE BLDC GLUCOMTR-MCNC: 169 MG/DL (ref 70–99)
GLUCOSE BLDC GLUCOMTR-MCNC: 174 MG/DL (ref 70–99)
GLUCOSE BLDC GLUCOMTR-MCNC: 99 MG/DL (ref 70–99)
GLUCOSE SERPL-MCNC: 106 MG/DL (ref 70–99)
GLUCOSE SERPL-MCNC: 116 MG/DL (ref 70–99)
GLUCOSE SERPL-MCNC: 154 MG/DL (ref 70–99)
GLUCOSE SERPL-MCNC: 155 MG/DL (ref 70–99)
GLUCOSE SERPL-MCNC: 166 MG/DL (ref 70–99)
HBV SURFACE AB SERPL IA-ACNC: 66.74 M[IU]/ML
HBV SURFACE AG SERPL QL IA: NONREACTIVE
HCO3 BLD-SCNC: 22 MMOL/L (ref 21–28)
HCO3 BLD-SCNC: 22 MMOL/L (ref 21–28)
HCO3 BLD-SCNC: 23 MMOL/L (ref 21–28)
HCO3 BLD-SCNC: 23 MMOL/L (ref 21–28)
HCO3 BLD-SCNC: 24 MMOL/L (ref 21–28)
HCO3 BLD-SCNC: 24 MMOL/L (ref 21–28)
HCO3 BLD-SCNC: 25 MMOL/L (ref 21–28)
HCO3 BLD-SCNC: 26 MMOL/L (ref 21–28)
HCO3 BLD-SCNC: 27 MMOL/L (ref 21–28)
HCO3 BLD-SCNC: 28 MMOL/L (ref 21–28)
HCO3 BLD-SCNC: 29 MMOL/L (ref 21–28)
HCO3 BLD-SCNC: 30 MMOL/L (ref 21–28)
HCO3 BLD-SCNC: 30 MMOL/L (ref 21–28)
HCO3 BLDV-SCNC: 19 MMOL/L (ref 21–28)
HCO3 BLDV-SCNC: 21 MMOL/L (ref 21–28)
HCO3 BLDV-SCNC: 24 MMOL/L (ref 21–28)
HCT VFR BLD AUTO: 25.5 % (ref 40–53)
HCT VFR BLD AUTO: 26.3 % (ref 40–53)
HCT VFR BLD AUTO: 26.7 % (ref 40–53)
HCT VFR BLD AUTO: 26.7 % (ref 40–53)
HCT VFR BLD AUTO: 30.6 % (ref 40–53)
HGB BLD-MCNC: 6.8 G/DL (ref 13.3–17.7)
HGB BLD-MCNC: 7.6 G/DL (ref 13.3–17.7)
HGB BLD-MCNC: 7.7 G/DL (ref 13.3–17.7)
HGB BLD-MCNC: 8 G/DL (ref 13.3–17.7)
HGB BLD-MCNC: 8 G/DL (ref 13.3–17.7)
HGB BLD-MCNC: 8.1 G/DL (ref 13.3–17.7)
HGB BLD-MCNC: 8.2 G/DL (ref 13.3–17.7)
HGB BLD-MCNC: 8.2 G/DL (ref 13.3–17.7)
HGB BLD-MCNC: 8.5 G/DL (ref 13.3–17.7)
HGB BLD-MCNC: 8.6 G/DL (ref 13.3–17.7)
HGB BLD-MCNC: 8.7 G/DL (ref 13.3–17.7)
HGB BLD-MCNC: 9.5 G/DL (ref 13.3–17.7)
HGB BLD-MCNC: 9.5 G/DL (ref 13.3–17.7)
HGB BLD-MCNC: 9.7 G/DL (ref 13.3–17.7)
HIV 1+2 AB+HIV1 P24 AG SERPL QL IA: NONREACTIVE
HLA FINAL CROSSMATCH RECIPIENT: NORMAL
IMM GRANULOCYTES # BLD: 0 10E9/L (ref 0–0.4)
IMM GRANULOCYTES NFR BLD: 0.1 %
IMM GRANULOCYTES NFR BLD: 0.2 %
IMM GRANULOCYTES NFR BLD: 0.3 %
IMM GRANULOCYTES NFR BLD: 0.4 %
INR PPP: 1.36 (ref 0.86–1.14)
INR PPP: 1.42 (ref 0.86–1.14)
INTERPRETATION ECG - MUSE: NORMAL
LACTATE BLD-SCNC: 0.9 MMOL/L (ref 0.7–2)
LACTATE BLD-SCNC: 1.1 MMOL/L (ref 0.7–2)
LACTATE BLD-SCNC: 1.1 MMOL/L (ref 0.7–2)
LACTATE BLD-SCNC: 1.5 MMOL/L (ref 0.7–2)
LACTATE BLD-SCNC: 1.8 MMOL/L (ref 0.7–2)
LACTATE BLD-SCNC: 3.1 MMOL/L (ref 0.7–2)
LACTATE BLD-SCNC: 5.1 MMOL/L (ref 0.7–2)
LACTATE BLD-SCNC: 5.6 MMOL/L (ref 0.7–2)
LACTATE BLD-SCNC: 6 MMOL/L (ref 0.7–2)
LACTATE BLD-SCNC: 7.3 MMOL/L (ref 0.7–2)
LACTATE BLD-SCNC: 7.6 MMOL/L (ref 0.7–2)
LACTATE BLD-SCNC: 7.8 MMOL/L (ref 0.7–2)
LYMPHOCYTES # BLD AUTO: 0.3 10E9/L (ref 0.8–5.3)
LYMPHOCYTES # BLD AUTO: 0.4 10E9/L (ref 0.8–5.3)
LYMPHOCYTES # BLD AUTO: 0.5 10E9/L (ref 0.8–5.3)
LYMPHOCYTES # BLD AUTO: 0.7 10E9/L (ref 0.8–5.3)
LYMPHOCYTES NFR BLD AUTO: 2.8 %
LYMPHOCYTES NFR BLD AUTO: 3.3 %
LYMPHOCYTES NFR BLD AUTO: 4 %
LYMPHOCYTES NFR BLD AUTO: 6.4 %
MAGNESIUM SERPL-MCNC: 2.5 MG/DL (ref 1.6–2.3)
MAGNESIUM SERPL-MCNC: 2.6 MG/DL (ref 1.6–2.3)
MAGNESIUM SERPL-MCNC: 2.7 MG/DL (ref 1.6–2.3)
MCH RBC QN AUTO: 28.6 PG (ref 26.5–33)
MCH RBC QN AUTO: 28.6 PG (ref 26.5–33)
MCH RBC QN AUTO: 28.9 PG (ref 26.5–33)
MCH RBC QN AUTO: 29.2 PG (ref 26.5–33)
MCH RBC QN AUTO: 29.3 PG (ref 26.5–33)
MCHC RBC AUTO-ENTMCNC: 31 G/DL (ref 31.5–36.5)
MCHC RBC AUTO-ENTMCNC: 32.2 G/DL (ref 31.5–36.5)
MCHC RBC AUTO-ENTMCNC: 32.2 G/DL (ref 31.5–36.5)
MCHC RBC AUTO-ENTMCNC: 32.3 G/DL (ref 31.5–36.5)
MCHC RBC AUTO-ENTMCNC: 32.6 G/DL (ref 31.5–36.5)
MCV RBC AUTO: 89 FL (ref 78–100)
MCV RBC AUTO: 90 FL (ref 78–100)
MCV RBC AUTO: 90 FL (ref 78–100)
MCV RBC AUTO: 91 FL (ref 78–100)
MCV RBC AUTO: 92 FL (ref 78–100)
MONOCYTES # BLD AUTO: 0.2 10E9/L (ref 0–1.3)
MONOCYTES # BLD AUTO: 0.2 10E9/L (ref 0–1.3)
MONOCYTES # BLD AUTO: 0.3 10E9/L (ref 0–1.3)
MONOCYTES # BLD AUTO: 0.5 10E9/L (ref 0–1.3)
MONOCYTES NFR BLD AUTO: 1.9 %
MONOCYTES NFR BLD AUTO: 2 %
MONOCYTES NFR BLD AUTO: 2.6 %
MONOCYTES NFR BLD AUTO: 4.5 %
MRSA DNA SPEC QL NAA+PROBE: NEGATIVE
NEUTROPHILS # BLD AUTO: 10 10E9/L (ref 1.6–8.3)
NEUTROPHILS # BLD AUTO: 10.3 10E9/L (ref 1.6–8.3)
NEUTROPHILS # BLD AUTO: 10.5 10E9/L (ref 1.6–8.3)
NEUTROPHILS # BLD AUTO: 11.2 10E9/L (ref 1.6–8.3)
NEUTROPHILS NFR BLD AUTO: 91.2 %
NEUTROPHILS NFR BLD AUTO: 92.4 %
NEUTROPHILS NFR BLD AUTO: 93.8 %
NEUTROPHILS NFR BLD AUTO: 94 %
NRBC # BLD AUTO: 0 10*3/UL
NRBC BLD AUTO-RTO: 0 /100
O2/TOTAL GAS SETTING VFR VENT: 100 %
O2/TOTAL GAS SETTING VFR VENT: 40 %
O2/TOTAL GAS SETTING VFR VENT: 70 %
O2/TOTAL GAS SETTING VFR VENT: 70 %
O2/TOTAL GAS SETTING VFR VENT: 80 %
OXYHGB MFR BLD: 95 % (ref 92–100)
OXYHGB MFR BLD: 95 % (ref 92–100)
OXYHGB MFR BLD: 96 % (ref 92–100)
OXYHGB MFR BLD: 96 % (ref 92–100)
OXYHGB MFR BLD: 97 % (ref 92–100)
OXYHGB MFR BLD: 97 % (ref 92–100)
OXYHGB MFR BLD: 98 % (ref 92–100)
OXYHGB MFR BLDV: 80 %
OXYHGB MFR BLDV: 80 %
OXYHGB MFR BLDV: 86 %
PCO2 BLD: 32 MM HG (ref 35–45)
PCO2 BLD: 34 MM HG (ref 35–45)
PCO2 BLD: 34 MM HG (ref 35–45)
PCO2 BLD: 36 MM HG (ref 35–45)
PCO2 BLD: 37 MM HG (ref 35–45)
PCO2 BLD: 38 MM HG (ref 35–45)
PCO2 BLD: 38 MM HG (ref 35–45)
PCO2 BLD: 39 MM HG (ref 35–45)
PCO2 BLD: 40 MM HG (ref 35–45)
PCO2 BLD: 41 MM HG (ref 35–45)
PCO2 BLD: 43 MM HG (ref 35–45)
PCO2 BLD: 45 MM HG (ref 35–45)
PCO2 BLD: 46 MM HG (ref 35–45)
PCO2 BLD: 49 MM HG (ref 35–45)
PCO2 BLD: 55 MM HG (ref 35–45)
PCO2 BLD: 57 MM HG (ref 35–45)
PCO2 BLDV: 30 MM HG (ref 40–50)
PCO2 BLDV: 36 MM HG (ref 40–50)
PCO2 BLDV: 48 MM HG (ref 40–50)
PH BLD: 7.33 PH (ref 7.35–7.45)
PH BLD: 7.34 PH (ref 7.35–7.45)
PH BLD: 7.36 PH (ref 7.35–7.45)
PH BLD: 7.38 PH (ref 7.35–7.45)
PH BLD: 7.38 PH (ref 7.35–7.45)
PH BLD: 7.39 PH (ref 7.35–7.45)
PH BLD: 7.4 PH (ref 7.35–7.45)
PH BLD: 7.41 PH (ref 7.35–7.45)
PH BLD: 7.42 PH (ref 7.35–7.45)
PH BLD: 7.45 PH (ref 7.35–7.45)
PH BLD: 7.46 PH (ref 7.35–7.45)
PH BLD: 7.47 PH (ref 7.35–7.45)
PH BLD: 7.48 PH (ref 7.35–7.45)
PH BLD: 7.5 PH (ref 7.35–7.45)
PH BLDV: 7.3 PH (ref 7.32–7.43)
PH BLDV: 7.33 PH (ref 7.32–7.43)
PH BLDV: 7.45 PH (ref 7.32–7.43)
PHOSPHATE SERPL-MCNC: 4.1 MG/DL (ref 2.5–4.5)
PHOSPHATE SERPL-MCNC: 4.5 MG/DL (ref 2.5–4.5)
PHOSPHATE SERPL-MCNC: 4.5 MG/DL (ref 2.5–4.5)
PHOSPHATE SERPL-MCNC: 5.2 MG/DL (ref 2.5–4.5)
PHOSPHATE SERPL-MCNC: 5.6 MG/DL (ref 2.5–4.5)
PLATELET # BLD AUTO: 135 10E9/L (ref 150–450)
PLATELET # BLD AUTO: 145 10E9/L (ref 150–450)
PLATELET # BLD AUTO: 150 10E9/L (ref 150–450)
PLATELET # BLD AUTO: 155 10E9/L (ref 150–450)
PLATELET # BLD AUTO: 156 10E9/L (ref 150–450)
PO2 BLD: 132 MM HG (ref 80–105)
PO2 BLD: 152 MM HG (ref 80–105)
PO2 BLD: 159 MM HG (ref 80–105)
PO2 BLD: 169 MM HG (ref 80–105)
PO2 BLD: 170 MM HG (ref 80–105)
PO2 BLD: 172 MM HG (ref 80–105)
PO2 BLD: 246 MM HG (ref 80–105)
PO2 BLD: 302 MM HG (ref 80–105)
PO2 BLD: 313 MM HG (ref 80–105)
PO2 BLD: 324 MM HG (ref 80–105)
PO2 BLD: 355 MM HG (ref 80–105)
PO2 BLD: 417 MM HG (ref 80–105)
PO2 BLD: 42 MM HG (ref 80–105)
PO2 BLD: 423 MM HG (ref 80–105)
PO2 BLD: 431 MM HG (ref 80–105)
PO2 BLD: 520 MM HG (ref 80–105)
PO2 BLDV: 45 MM HG (ref 25–47)
PO2 BLDV: 53 MM HG (ref 25–47)
PO2 BLDV: 61 MM HG (ref 25–47)
POTASSIUM BLD-SCNC: 3.9 MMOL/L (ref 3.4–5.3)
POTASSIUM BLD-SCNC: 4.1 MMOL/L (ref 3.4–5.3)
POTASSIUM BLD-SCNC: 4.1 MMOL/L (ref 3.4–5.3)
POTASSIUM BLD-SCNC: 4.9 MMOL/L (ref 3.4–5.3)
POTASSIUM BLD-SCNC: 5 MMOL/L (ref 3.4–5.3)
POTASSIUM BLD-SCNC: 5.1 MMOL/L (ref 3.4–5.3)
POTASSIUM BLD-SCNC: 5.5 MMOL/L (ref 3.4–5.3)
POTASSIUM BLD-SCNC: 5.5 MMOL/L (ref 3.4–5.3)
POTASSIUM BLD-SCNC: 5.9 MMOL/L (ref 3.4–5.3)
POTASSIUM SERPL-SCNC: 4 MMOL/L (ref 3.4–5.3)
POTASSIUM SERPL-SCNC: 4.1 MMOL/L (ref 3.4–5.3)
POTASSIUM SERPL-SCNC: 4.4 MMOL/L (ref 3.4–5.3)
POTASSIUM SERPL-SCNC: 4.6 MMOL/L (ref 3.4–5.3)
POTASSIUM SERPL-SCNC: 5 MMOL/L (ref 3.4–5.3)
PROT SERPL-MCNC: 6.1 G/DL (ref 6.8–8.8)
PROT SERPL-MCNC: 6.1 G/DL (ref 6.8–8.8)
PROT SERPL-MCNC: 6.3 G/DL (ref 6.8–8.8)
PROT SERPL-MCNC: 6.5 G/DL (ref 6.8–8.8)
PROT SERPL-MCNC: 6.5 G/DL (ref 6.8–8.8)
RBC # BLD AUTO: 2.87 10E12/L (ref 4.4–5.9)
RBC # BLD AUTO: 2.94 10E12/L (ref 4.4–5.9)
RBC # BLD AUTO: 2.94 10E12/L (ref 4.4–5.9)
RBC # BLD AUTO: 2.98 10E12/L (ref 4.4–5.9)
RBC # BLD AUTO: 3.32 10E12/L (ref 4.4–5.9)
SODIUM BLD-SCNC: 132 MMOL/L (ref 133–144)
SODIUM BLD-SCNC: 133 MMOL/L (ref 133–144)
SODIUM BLD-SCNC: 133 MMOL/L (ref 133–144)
SODIUM BLD-SCNC: 135 MMOL/L (ref 133–144)
SODIUM BLD-SCNC: 136 MMOL/L (ref 133–144)
SODIUM BLD-SCNC: 137 MMOL/L (ref 133–144)
SODIUM BLD-SCNC: 138 MMOL/L (ref 133–144)
SODIUM SERPL-SCNC: 135 MMOL/L (ref 133–144)
SODIUM SERPL-SCNC: 136 MMOL/L (ref 133–144)
SODIUM SERPL-SCNC: 137 MMOL/L (ref 133–144)
SPECIMEN SOURCE: NORMAL
TRANSFUSION STATUS PATIENT QL: NORMAL
WBC # BLD AUTO: 10.8 10E9/L (ref 4–11)
WBC # BLD AUTO: 10.9 10E9/L (ref 4–11)
WBC # BLD AUTO: 11.2 10E9/L (ref 4–11)
WBC # BLD AUTO: 11.3 10E9/L (ref 4–11)
WBC # BLD AUTO: 12 10E9/L (ref 4–11)

## 2018-06-15 PROCEDURE — 00000146 ZZHCL STATISTIC GLUCOSE BY METER IP

## 2018-06-15 PROCEDURE — 87640 STAPH A DNA AMP PROBE: CPT | Performed by: THORACIC SURGERY (CARDIOTHORACIC VASCULAR SURGERY)

## 2018-06-15 PROCEDURE — 99233 SBSQ HOSP IP/OBS HIGH 50: CPT | Mod: GC | Performed by: SURGERY

## 2018-06-15 PROCEDURE — 25000128 H RX IP 250 OP 636: Performed by: STUDENT IN AN ORGANIZED HEALTH CARE EDUCATION/TRAINING PROGRAM

## 2018-06-15 PROCEDURE — A9270 NON-COVERED ITEM OR SERVICE: HCPCS | Mod: GY | Performed by: THORACIC SURGERY (CARDIOTHORACIC VASCULAR SURGERY)

## 2018-06-15 PROCEDURE — 90947 DIALYSIS REPEATED EVAL: CPT

## 2018-06-15 PROCEDURE — 83605 ASSAY OF LACTIC ACID: CPT | Performed by: THORACIC SURGERY (CARDIOTHORACIC VASCULAR SURGERY)

## 2018-06-15 PROCEDURE — 25000128 H RX IP 250 OP 636: Performed by: THORACIC SURGERY (CARDIOTHORACIC VASCULAR SURGERY)

## 2018-06-15 PROCEDURE — 94002 VENT MGMT INPAT INIT DAY: CPT

## 2018-06-15 PROCEDURE — 85610 PROTHROMBIN TIME: CPT | Performed by: THORACIC SURGERY (CARDIOTHORACIC VASCULAR SURGERY)

## 2018-06-15 PROCEDURE — 71045 X-RAY EXAM CHEST 1 VIEW: CPT

## 2018-06-15 PROCEDURE — 40000048 ZZH STATISTIC DAILY SWAN MONITORING

## 2018-06-15 PROCEDURE — 82805 BLOOD GASES W/O2 SATURATION: CPT | Performed by: THORACIC SURGERY (CARDIOTHORACIC VASCULAR SURGERY)

## 2018-06-15 PROCEDURE — 40000196 ZZH STATISTIC RAPCV CVP MONITORING

## 2018-06-15 PROCEDURE — 85027 COMPLETE CBC AUTOMATED: CPT | Performed by: THORACIC SURGERY (CARDIOTHORACIC VASCULAR SURGERY)

## 2018-06-15 PROCEDURE — 99024 POSTOP FOLLOW-UP VISIT: CPT | Performed by: INTERNAL MEDICINE

## 2018-06-15 PROCEDURE — 40000985 XR ABDOMEN PORT 1 VW

## 2018-06-15 PROCEDURE — 83735 ASSAY OF MAGNESIUM: CPT | Performed by: THORACIC SURGERY (CARDIOTHORACIC VASCULAR SURGERY)

## 2018-06-15 PROCEDURE — 40000014 ZZH STATISTIC ARTERIAL MONITORING DAILY

## 2018-06-15 PROCEDURE — 25000131 ZZH RX MED GY IP 250 OP 636 PS 637: Performed by: THORACIC SURGERY (CARDIOTHORACIC VASCULAR SURGERY)

## 2018-06-15 PROCEDURE — A7035 POS AIRWAY PRESS HEADGEAR: HCPCS

## 2018-06-15 PROCEDURE — 80053 COMPREHEN METABOLIC PANEL: CPT | Performed by: THORACIC SURGERY (CARDIOTHORACIC VASCULAR SURGERY)

## 2018-06-15 PROCEDURE — 82330 ASSAY OF CALCIUM: CPT | Performed by: INTERNAL MEDICINE

## 2018-06-15 PROCEDURE — 80053 COMPREHEN METABOLIC PANEL: CPT | Performed by: INTERNAL MEDICINE

## 2018-06-15 PROCEDURE — P9041 ALBUMIN (HUMAN),5%, 50ML: HCPCS

## 2018-06-15 PROCEDURE — 25000132 ZZH RX MED GY IP 250 OP 250 PS 637: Performed by: INTERNAL MEDICINE

## 2018-06-15 PROCEDURE — 94799 UNLISTED PULMONARY SVC/PX: CPT

## 2018-06-15 PROCEDURE — 25000125 ZZHC RX 250: Performed by: INTERNAL MEDICINE

## 2018-06-15 PROCEDURE — 93005 ELECTROCARDIOGRAM TRACING: CPT

## 2018-06-15 PROCEDURE — 25000132 ZZH RX MED GY IP 250 OP 250 PS 637: Performed by: STUDENT IN AN ORGANIZED HEALTH CARE EDUCATION/TRAINING PROGRAM

## 2018-06-15 PROCEDURE — 44500 INTRO GASTROINTESTINAL TUBE: CPT | Performed by: DIETITIAN, REGISTERED

## 2018-06-15 PROCEDURE — 85610 PROTHROMBIN TIME: CPT | Performed by: INTERNAL MEDICINE

## 2018-06-15 PROCEDURE — 25000125 ZZHC RX 250: Performed by: STUDENT IN AN ORGANIZED HEALTH CARE EDUCATION/TRAINING PROGRAM

## 2018-06-15 PROCEDURE — 85730 THROMBOPLASTIN TIME PARTIAL: CPT | Performed by: THORACIC SURGERY (CARDIOTHORACIC VASCULAR SURGERY)

## 2018-06-15 PROCEDURE — 84100 ASSAY OF PHOSPHORUS: CPT | Performed by: THORACIC SURGERY (CARDIOTHORACIC VASCULAR SURGERY)

## 2018-06-15 PROCEDURE — 40000986 XR ABDOMEN PORT 1 VW

## 2018-06-15 PROCEDURE — 5A1D90Z PERFORMANCE OF URINARY FILTRATION, CONTINUOUS, GREATER THAN 18 HOURS PER DAY: ICD-10-PCS | Performed by: INTERNAL MEDICINE

## 2018-06-15 PROCEDURE — 82330 ASSAY OF CALCIUM: CPT | Performed by: THORACIC SURGERY (CARDIOTHORACIC VASCULAR SURGERY)

## 2018-06-15 PROCEDURE — 85025 COMPLETE CBC W/AUTO DIFF WBC: CPT | Performed by: INTERNAL MEDICINE

## 2018-06-15 PROCEDURE — 25000132 ZZH RX MED GY IP 250 OP 250 PS 637: Performed by: THORACIC SURGERY (CARDIOTHORACIC VASCULAR SURGERY)

## 2018-06-15 PROCEDURE — 87641 MR-STAPH DNA AMP PROBE: CPT | Performed by: THORACIC SURGERY (CARDIOTHORACIC VASCULAR SURGERY)

## 2018-06-15 PROCEDURE — 25800025 ZZH RX 258: Performed by: THORACIC SURGERY (CARDIOTHORACIC VASCULAR SURGERY)

## 2018-06-15 PROCEDURE — 83735 ASSAY OF MAGNESIUM: CPT | Performed by: INTERNAL MEDICINE

## 2018-06-15 PROCEDURE — 20000004 ZZH R&B ICU UMMC

## 2018-06-15 PROCEDURE — 84100 ASSAY OF PHOSPHORUS: CPT | Performed by: INTERNAL MEDICINE

## 2018-06-15 PROCEDURE — 25000128 H RX IP 250 OP 636: Performed by: INTERNAL MEDICINE

## 2018-06-15 PROCEDURE — 25000128 H RX IP 250 OP 636

## 2018-06-15 PROCEDURE — 40000275 ZZH STATISTIC RCP TIME EA 10 MIN

## 2018-06-15 PROCEDURE — 93010 ELECTROCARDIOGRAM REPORT: CPT | Performed by: INTERNAL MEDICINE

## 2018-06-15 PROCEDURE — 27210432 ZZH NUTRITION PRODUCT RENAL BASIC LITER

## 2018-06-15 PROCEDURE — 85730 THROMBOPLASTIN TIME PARTIAL: CPT | Performed by: INTERNAL MEDICINE

## 2018-06-15 PROCEDURE — 25000125 ZZHC RX 250: Performed by: THORACIC SURGERY (CARDIOTHORACIC VASCULAR SURGERY)

## 2018-06-15 RX ORDER — HYDROMORPHONE HYDROCHLORIDE 1 MG/ML
.3-.5 INJECTION, SOLUTION INTRAMUSCULAR; INTRAVENOUS; SUBCUTANEOUS
Status: DISCONTINUED | OUTPATIENT
Start: 2018-06-15 | End: 2018-06-15

## 2018-06-15 RX ORDER — DEXTROSE MONOHYDRATE 25 G/50ML
25-50 INJECTION, SOLUTION INTRAVENOUS
Status: DISCONTINUED | OUTPATIENT
Start: 2018-06-15 | End: 2018-06-17

## 2018-06-15 RX ORDER — METHYLPREDNISOLONE SODIUM SUCCINATE 125 MG/2ML
125 INJECTION, POWDER, LYOPHILIZED, FOR SOLUTION INTRAMUSCULAR; INTRAVENOUS EVERY 8 HOURS
Status: COMPLETED | OUTPATIENT
Start: 2018-06-15 | End: 2018-06-15

## 2018-06-15 RX ORDER — NALOXONE HYDROCHLORIDE 0.4 MG/ML
.1-.4 INJECTION, SOLUTION INTRAMUSCULAR; INTRAVENOUS; SUBCUTANEOUS
Status: DISCONTINUED | OUTPATIENT
Start: 2018-06-15 | End: 2018-06-15

## 2018-06-15 RX ORDER — LIDOCAINE 40 MG/G
CREAM TOPICAL
Status: DISCONTINUED | OUTPATIENT
Start: 2018-06-15 | End: 2018-07-02 | Stop reason: HOSPADM

## 2018-06-15 RX ORDER — B COMPLEX C NO.10/FOLIC ACID 900MCG/5ML
5 LIQUID (ML) ORAL DAILY
Status: DISCONTINUED | OUTPATIENT
Start: 2018-06-15 | End: 2018-06-16

## 2018-06-15 RX ORDER — ALBUTEROL SULFATE 90 UG/1
6 AEROSOL, METERED RESPIRATORY (INHALATION) EVERY 4 HOURS PRN
Status: DISCONTINUED | OUTPATIENT
Start: 2018-06-15 | End: 2018-07-02 | Stop reason: HOSPADM

## 2018-06-15 RX ORDER — AMOXICILLIN 250 MG
1 CAPSULE ORAL 2 TIMES DAILY
Status: DISCONTINUED | OUTPATIENT
Start: 2018-06-15 | End: 2018-07-02 | Stop reason: HOSPADM

## 2018-06-15 RX ORDER — ACETAMINOPHEN 325 MG/1
650 TABLET ORAL EVERY 4 HOURS PRN
Status: DISCONTINUED | OUTPATIENT
Start: 2018-06-17 | End: 2018-07-02 | Stop reason: HOSPADM

## 2018-06-15 RX ORDER — IPRATROPIUM BROMIDE AND ALBUTEROL SULFATE 2.5; .5 MG/3ML; MG/3ML
3 SOLUTION RESPIRATORY (INHALATION) EVERY 4 HOURS PRN
Status: DISCONTINUED | OUTPATIENT
Start: 2018-06-15 | End: 2018-07-02 | Stop reason: HOSPADM

## 2018-06-15 RX ORDER — AMOXICILLIN 250 MG
2 CAPSULE ORAL 2 TIMES DAILY
Status: DISCONTINUED | OUTPATIENT
Start: 2018-06-15 | End: 2018-06-18

## 2018-06-15 RX ORDER — SULFAMETHOXAZOLE AND TRIMETHOPRIM 200; 40 MG/5ML; MG/5ML
20 SUSPENSION ORAL
Status: DISCONTINUED | OUTPATIENT
Start: 2018-06-18 | End: 2018-06-16

## 2018-06-15 RX ORDER — ONDANSETRON 2 MG/ML
4 INJECTION INTRAMUSCULAR; INTRAVENOUS EVERY 6 HOURS PRN
Status: DISCONTINUED | OUTPATIENT
Start: 2018-06-15 | End: 2018-07-02 | Stop reason: HOSPADM

## 2018-06-15 RX ORDER — VALGANCICLOVIR HYDROCHLORIDE 50 MG/ML
450 POWDER, FOR SOLUTION ORAL EVERY OTHER DAY
Status: DISCONTINUED | OUTPATIENT
Start: 2018-06-15 | End: 2018-06-17

## 2018-06-15 RX ORDER — PROPOFOL 10 MG/ML
10-20 INJECTION, EMULSION INTRAVENOUS EVERY 30 MIN PRN
Status: DISCONTINUED | OUTPATIENT
Start: 2018-06-15 | End: 2018-06-16

## 2018-06-15 RX ORDER — NICOTINE POLACRILEX 4 MG
15-30 LOZENGE BUCCAL
Status: DISCONTINUED | OUTPATIENT
Start: 2018-06-15 | End: 2018-06-17

## 2018-06-15 RX ORDER — OXYCODONE HYDROCHLORIDE 5 MG/1
5-10 TABLET ORAL EVERY 4 HOURS PRN
Status: DISCONTINUED | OUTPATIENT
Start: 2018-06-15 | End: 2018-06-16

## 2018-06-15 RX ORDER — FENTANYL CITRATE 50 UG/ML
50-100 INJECTION, SOLUTION INTRAMUSCULAR; INTRAVENOUS
Status: DISCONTINUED | OUTPATIENT
Start: 2018-06-15 | End: 2018-06-15

## 2018-06-15 RX ORDER — POTASSIUM CHLORIDE 29.8 MG/ML
20 INJECTION INTRAVENOUS EVERY 6 HOURS PRN
Status: DISCONTINUED | OUTPATIENT
Start: 2018-06-15 | End: 2018-06-16

## 2018-06-15 RX ORDER — PROPOFOL 10 MG/ML
5-75 INJECTION, EMULSION INTRAVENOUS CONTINUOUS
Status: DISCONTINUED | OUTPATIENT
Start: 2018-06-15 | End: 2018-06-16

## 2018-06-15 RX ORDER — NALOXONE HYDROCHLORIDE 0.4 MG/ML
.1-.4 INJECTION, SOLUTION INTRAMUSCULAR; INTRAVENOUS; SUBCUTANEOUS
Status: DISCONTINUED | OUTPATIENT
Start: 2018-06-15 | End: 2018-07-02 | Stop reason: HOSPADM

## 2018-06-15 RX ORDER — VANCOMYCIN HYDROCHLORIDE 1 G/200ML
1000 INJECTION, SOLUTION INTRAVENOUS EVERY 12 HOURS
Status: DISCONTINUED | OUTPATIENT
Start: 2018-06-15 | End: 2018-06-15

## 2018-06-15 RX ORDER — TACROLIMUS 0.5 MG/1
0.5 CAPSULE ORAL
Status: DISCONTINUED | OUTPATIENT
Start: 2018-06-15 | End: 2018-06-15

## 2018-06-15 RX ORDER — ACETAMINOPHEN 325 MG/1
975 TABLET ORAL EVERY 8 HOURS
Status: COMPLETED | OUTPATIENT
Start: 2018-06-15 | End: 2018-06-17

## 2018-06-15 RX ORDER — SULFAMETHOXAZOLE/TRIMETHOPRIM 800-160 MG
1 TABLET ORAL
Status: DISCONTINUED | OUTPATIENT
Start: 2018-06-18 | End: 2018-06-16

## 2018-06-15 RX ORDER — NYSTATIN 100000/ML
1000000 SUSPENSION, ORAL (FINAL DOSE FORM) ORAL 4 TIMES DAILY
Status: DISCONTINUED | OUTPATIENT
Start: 2018-06-15 | End: 2018-06-16

## 2018-06-15 RX ORDER — ALBUMIN, HUMAN INJ 5% 5 %
SOLUTION INTRAVENOUS
Status: COMPLETED
Start: 2018-06-15 | End: 2018-06-15

## 2018-06-15 RX ORDER — PREDNISONE 20 MG/1
20 TABLET ORAL 2 TIMES DAILY
Status: DISCONTINUED | OUTPATIENT
Start: 2018-06-24 | End: 2018-06-28

## 2018-06-15 RX ORDER — ONDANSETRON 4 MG/1
4 TABLET, ORALLY DISINTEGRATING ORAL EVERY 6 HOURS PRN
Status: DISCONTINUED | OUTPATIENT
Start: 2018-06-15 | End: 2018-07-02 | Stop reason: HOSPADM

## 2018-06-15 RX ORDER — PREDNISONE 20 MG/1
40 TABLET ORAL 2 TIMES DAILY
Status: COMPLETED | OUTPATIENT
Start: 2018-06-16 | End: 2018-06-17

## 2018-06-15 RX ORDER — MAGNESIUM SULFATE HEPTAHYDRATE 40 MG/ML
2 INJECTION, SOLUTION INTRAVENOUS EVERY 6 HOURS PRN
Status: DISCONTINUED | OUTPATIENT
Start: 2018-06-15 | End: 2018-06-16

## 2018-06-15 RX ADMIN — SENNOSIDES AND DOCUSATE SODIUM 2 TABLET: 8.6; 5 TABLET ORAL at 21:04

## 2018-06-15 RX ADMIN — PANTOPRAZOLE SODIUM 40 MG: 40 INJECTION, POWDER, FOR SOLUTION INTRAVENOUS at 08:14

## 2018-06-15 RX ADMIN — ISOPROTERENOL HYDROCHLORIDE 0.03 MCG/KG/MIN: 0.2 INJECTION, SOLUTION INTRAMUSCULAR; INTRAVENOUS at 01:01

## 2018-06-15 RX ADMIN — PROPOFOL 30 MCG/KG/MIN: 10 INJECTION, EMULSION INTRAVENOUS at 21:38

## 2018-06-15 RX ADMIN — Medication 12.5 ML/KG/HR: at 17:28

## 2018-06-15 RX ADMIN — Medication 12.5 ML/KG/HR: at 07:55

## 2018-06-15 RX ADMIN — Medication 12.5 ML/KG/HR: at 13:03

## 2018-06-15 RX ADMIN — NYSTATIN 1000000 UNITS: 100000 SUSPENSION ORAL at 13:43

## 2018-06-15 RX ADMIN — Medication 0.5 MG: at 11:07

## 2018-06-15 RX ADMIN — HUMAN INSULIN 7 UNITS/HR: 100 INJECTION, SOLUTION SUBCUTANEOUS at 03:04

## 2018-06-15 RX ADMIN — METHYLPREDNISOLONE 125 MG: 125 INJECTION, POWDER, LYOPHILIZED, FOR SOLUTION INTRAMUSCULAR; INTRAVENOUS at 01:01

## 2018-06-15 RX ADMIN — ACETAMINOPHEN 975 MG: 325 TABLET, FILM COATED ORAL at 09:13

## 2018-06-15 RX ADMIN — HUMAN INSULIN 7 UNITS/HR: 100 INJECTION, SOLUTION SUBCUTANEOUS at 00:25

## 2018-06-15 RX ADMIN — Medication 100 MCG/HR: at 02:37

## 2018-06-15 RX ADMIN — ACETAMINOPHEN 975 MG: 325 TABLET, FILM COATED ORAL at 01:52

## 2018-06-15 RX ADMIN — Medication 12.5 ML/KG/HR: at 01:57

## 2018-06-15 RX ADMIN — HUMAN INSULIN 5 UNITS/HR: 100 INJECTION, SOLUTION SUBCUTANEOUS at 07:42

## 2018-06-15 RX ADMIN — NYSTATIN 1000000 UNITS: 100000 SUSPENSION ORAL at 08:01

## 2018-06-15 RX ADMIN — Medication: at 01:57

## 2018-06-15 RX ADMIN — LIDOCAINE HYDROCHLORIDE 5 ML: 20 SOLUTION ORAL; TOPICAL at 13:42

## 2018-06-15 RX ADMIN — PROPOFOL 30 MCG/KG/MIN: 10 INJECTION, EMULSION INTRAVENOUS at 11:11

## 2018-06-15 RX ADMIN — Medication: at 17:28

## 2018-06-15 RX ADMIN — METHYLPREDNISOLONE 125 MG: 125 INJECTION, POWDER, LYOPHILIZED, FOR SOLUTION INTRAMUSCULAR; INTRAVENOUS at 17:41

## 2018-06-15 RX ADMIN — ISOPROTERENOL HYDROCHLORIDE 0.04 MCG/KG/MIN: 0.2 INJECTION, SOLUTION INTRAMUSCULAR; INTRAVENOUS at 09:49

## 2018-06-15 RX ADMIN — PROPOFOL 30 MCG/KG/MIN: 10 INJECTION, EMULSION INTRAVENOUS at 01:01

## 2018-06-15 RX ADMIN — MYCOPHENOLATE MOFETIL 1500 MG: 500 INJECTION, POWDER, LYOPHILIZED, FOR SOLUTION INTRAVENOUS at 02:09

## 2018-06-15 RX ADMIN — ISOPROTERENOL HYDROCHLORIDE 0.06 MCG/KG/MIN: 0.2 INJECTION, SOLUTION INTRAMUSCULAR; INTRAVENOUS at 18:31

## 2018-06-15 RX ADMIN — ALBUMIN HUMAN 500 ML: 0.05 INJECTION, SOLUTION INTRAVENOUS at 07:00

## 2018-06-15 RX ADMIN — HUMAN INSULIN 4 UNITS/HR: 100 INJECTION, SOLUTION SUBCUTANEOUS at 18:30

## 2018-06-15 RX ADMIN — ISOPROTERENOL HYDROCHLORIDE 0.06 MCG/KG/MIN: 0.2 INJECTION, SOLUTION INTRAMUSCULAR; INTRAVENOUS at 14:56

## 2018-06-15 RX ADMIN — METHYLPREDNISOLONE 125 MG: 125 INJECTION, POWDER, LYOPHILIZED, FOR SOLUTION INTRAMUSCULAR; INTRAVENOUS at 08:14

## 2018-06-15 RX ADMIN — Medication 5 ML: at 17:21

## 2018-06-15 RX ADMIN — Medication 12.5 ML/KG/HR: at 13:04

## 2018-06-15 RX ADMIN — VALGANCICLOVIR HYDROCHLORIDE 450 MG: 50 POWDER, FOR SOLUTION ORAL at 17:20

## 2018-06-15 RX ADMIN — VANCOMYCIN HYDROCHLORIDE 1500 MG: 10 INJECTION, POWDER, LYOPHILIZED, FOR SOLUTION INTRAVENOUS at 11:17

## 2018-06-15 RX ADMIN — MYCOPHENOLATE MOFETIL 1500 MG: 500 INJECTION, POWDER, LYOPHILIZED, FOR SOLUTION INTRAVENOUS at 15:27

## 2018-06-15 RX ADMIN — Medication 12.5 ML/KG/HR: at 22:16

## 2018-06-15 RX ADMIN — NYSTATIN 1000000 UNITS: 100000 SUSPENSION ORAL at 17:20

## 2018-06-15 RX ADMIN — ISOPROTERENOL HYDROCHLORIDE 0.06 MCG/KG/MIN: 0.2 INJECTION, SOLUTION INTRAMUSCULAR; INTRAVENOUS at 22:12

## 2018-06-15 RX ADMIN — NYSTATIN 1000000 UNITS: 100000 SUSPENSION ORAL at 21:04

## 2018-06-15 RX ADMIN — ACETAMINOPHEN 975 MG: 325 TABLET, FILM COATED ORAL at 17:21

## 2018-06-15 RX ADMIN — Medication 100 MCG/HR: at 15:20

## 2018-06-15 RX ADMIN — Medication 12.5 ML/KG/HR: at 07:49

## 2018-06-15 RX ADMIN — DEXTROSE AND SODIUM CHLORIDE: 5; 450 INJECTION, SOLUTION INTRAVENOUS at 01:15

## 2018-06-15 RX ADMIN — Medication 0.5 MG: at 18:30

## 2018-06-15 RX ADMIN — SENNOSIDES AND DOCUSATE SODIUM 2 TABLET: 8.6; 5 TABLET ORAL at 08:01

## 2018-06-15 NOTE — PROGRESS NOTES
Transplant Social Work Services Progress Note      Collaborated with: Jordi (son)    Data: Murray received heart transplant yesterday. Remains intubated and sedated in the ICU.   Intervention: Met with Jordi to introduce self. Discussed options for FMLA.   Assessment: Jordi reports that he's pleased with his dad's progress. He states that he doesn't need FMLA at this time; but will ask his brother if he is going to need it. Jordi reports that they have a good team of people arranged to care for Murray and he's hoping to miss very little work. He denied questions or concerns at this time.   Education provided by [facility]: FMLA, caregiver post hospitalization  Plan:    Discharge Plans in Progress: pending medical stability    Barriers to d/c plan: new heart transplant    Follow up Plan: [facility] will continue to follow for support.    Pager [phone]

## 2018-06-15 NOTE — ANESTHESIA PROCEDURE NOTES
Perioperative MARIANA Report  Anesthesia Information  MARIANA probe placed and report generated by: : SRINI BLISS DANIELA VIOLETA    Surgeon:  JEROMY DENT      Preanesthesia Checklist:  Patient identified, IV assessed, risks and benefits discussed, monitors and equipment assessed, procedure being performed at surgeon's request and anesthesia consent obtained.  General Procedure Information  Modalities:  2D, 3D, CW Doppler, PW Doppler and Color flow mapping  Diagnostic indications for MARIANA:                             Heart Transplant.    Echocardiographic and Doppler Measurements  Right Ventricle:  Ejection Fraction 10%.    Left Ventricle:  Ejection Fraction 10%.      Ventricular Regional Function:  1- Basal Anteroseptal:  hypokinetic  2- Basal Anterior:  hypokinetic  3- Basal Anterolateral:  hypokinetic  4- Basal Inferolateral:  hypokinetic  5- Basal Inferior:  hypokinetic  6- Basal Inferoseptal:  hypokinetic  7- Mid Anteroseptal:  hypokinetic  8- Mid Anterior:  hypokinetic  9- Mid Anterolateral:  hypokinetic  10- Mid Inferolateral:  hypokinetic  11- Mid Inferior:  hypokinetic  12- Mid Inferoseptal:  hypokinetic  13- Apical Anterior:  hypokinetic  14- Apical Lateral:  hypokinetic  15- Apical Inferior:  hypokinetic  16- Apical Septal:  hypokinetic  17- Desha:  hypokinetic    Valves  Aortic Valve: Annulus normal.  Stenosis not present.  Regurgitation +3.  Leaflets bicuspid.  Leaflet motions restricted.  Specific leaflet segments with abnormal motions:  LCC and RCC  Mitral Valve: Annulus dilated.  Stenosis not present.  Regurgitation +2.  Leaflets normal.  Leaflet motions normal.    Tricuspid Valve: Annulus dilated.  Stenosis not present.  Regurgitation +2.  Leaflets normal.  Leaflet motions normal.    Pulmonic Valve: Annulus normal.  Stenosis not present.  Regurgitation +1.      Aorta: Ascending Aorta: Size dilated.  Dissection not present.  Plaque thickness less than 3 mm.  Mobile plaque not  present.    Aortic Arch: Size normal.   Dissection not present.   Plaque thickness less than 3 mm.   Mobile plaque not present.    Descending Aorta: Size normal.   Dissection not present.   Plaque thickness less than 3 mm.   Mobile plaque not present.        Right Atrium:  Size dilated.   Spontaneous echo contrast not present.   Thrombus not present.   Tumor not present.   Device present.   Left Atrium: Size dilated.  Spontaneous echo contrast not present.  Thrombus not present.  Tumor not present.  Device not present.    Left atrial appendage normal.     Atrial Septum: Intra-atrial septal morphology normal.     Ventricular Septum: Intra-ventricular septum morphology normal.       Other Findings:   Pericardium:  normal.  Pleural Effusion:  none. Pulmonary Arteries:  normal.  .  No coronary sinus catheter present.  .  .  Post Intervention Findings  Procedure(s) performed:  Heart or Lung Transplant. .   Regional wall motion:   Surgeon(s) notified of all postintervention findings:  Yes  .  .  .   .  .  .  .  .  .  .        Echocardiogram Comments

## 2018-06-15 NOTE — TELEPHONE ENCOUNTER
Organ Offer Encounter Information    Organ Offer Information   Organ offer date & time:  6/14/2018  8:26 AM   Coordinator/Fellow/Attending name:  SANA AYOUB   Organ(s):   Organ UNOS ID Match Run ID Comment Organ Laterality   Heart [id number]           Recent infections?:  No    New medications?:  No Recent pregnancy?:  No   Angicoagulation medications?:  Yes (Comment: Heparin w/ dialysis) Recent vaccinations?:  No   Recent blood transfusions?:  No Recent hospitalizations?:  Yes (Comment: Currently on 4E)   Has your insurance changed in the last 6-12 months?:  Neg    Discussed organ offer with:  Patient   Patient/Caregiver name:  Murray   Discussed risk category with Patient/Other:  [employer] Risk   Understood donor criteria, verbalized understanding   Patient/Other asked to speak to a surgeon?:  No   Discussed program-specific outcomes:  Did not have questions regarding SRTR   Right to decline organ offer without penalty, Patient/Other:  Aware of option to decline without penalty   Organ offer decision status Patient/Other:  Accepted Offer   Organ disposition:  Transplanted   Additional Comments:  [id number]:Requested to speak with his MD. Dr. Blum was notified and will go to speak with him. Offer is for HEART ONLY (w/o kidney)    HEART donor was identified by SANA Ayoub and reviewed with Dr. Shearer.  The organ was accepted.  The pt is currently on 4E      Donor UNOS ID [id number] and Match ID [id number] confirmed with Dr. Shearer.      Donor and recipient blood type reviewed and found to be IDENTICAL      Recipient with HLA antibodies: NO  Crossmatch required   Prospective:   Virtual: Completed  Crossmatch reviewed with Dr. Webster, immunology staff on call and deemed negative based on organ specific protocol.     Donor specific antibodies absent, notified Dr. Shearer    Donor meets criteria to be classified as PHS INCREASED RISK; Dr. Blum spoke with recipient in his room and patient is willing to proceed  with transplant.      Pt was contacted ON 4E    Verified pt has not had any blood transfusions since their last PRA sample on 04.16.18    Pt Instructed to remain NPO for pre-op prep.      Pt on Coumadin: NO   Intervention: n/a    HEART RECIPIENT: Renal function reviewed, pt IS NOT pre-selected for CNI delaying protocol, Dr. Shearer notified.     ABO/CMV/EBV status note:   UNOS donor ID [id number]  Donor blood type is A: Verified by donor records   Recipient blood type is A-: verified by blood bank [facility].   Donor CMV status is positive. Verified by donor records.   Recipient CMV status is negative. Verified in [facility] lab results.   Donor EBV status is positive Verified by donor records.   Recipient EBV status is pending. Verified in [facility] lab results.   Recipient VZV status is positive. verified in [facility] lab results.      Attestation I have discussed all of the above with the Patient/Legal Guardian/Caregiver regarding this organ offer.:  Yes   Coordinator/Fellow/Attending name:  SANA LOUISE

## 2018-06-15 NOTE — PLAN OF CARE
Problem: Patient Care Overview  Goal: Plan of Care/Patient Progress Review    Patient returned from the OR at 0010 post heart transplant. Sedated on propofol. Fentanyl gtt started. Follows commands and WALL with reduced sedation. PERRL. Elizabeth hugger on for l. HR . Isuprel infusing. Back up pacer VVI 80 bpm. BP labile this AM. Epi and levo on with 500 albumin given. CMV: 16, 500, 40%, 5. CT output 10-30/hr. Insulin gtt 5 units/hr. OG LIS. CRRT started. Goal to be I=O once BP stable. Slightly positive d/t labile BP. No family at the bedside overnight.

## 2018-06-15 NOTE — OP NOTE
Procedure Date: 06/14/2018      DATE OF SURGERY:  06/14/2018      PREOPERATIVE DIAGNOSES:   1.  End-stage congestive heart failure due to valvular disease of mixed etiology.   2.  Severe aortic insufficiency.   3.  Severe mitral regurgitation.   4.  Severely decreased left ventricular systolic ejection function with ejection fraction of 15-10%.   5.  End-stage congestive heart failure requiring inotropic support.   6.  Chronic renal disease on hemodialysis.   7.  Ascending aortic aneurysm.   8.  Hypertension.   9.  10, hyperlipidemia   10.  Type 2 diabetes.   11.  Right atrium thrombus formed around the dialysis catheter.        POSTOPERATIVE DIAGNOSES:   1.  End-stage congestive heart failure due to valvular disease or mixed etiology.   2.  Severe aortic insufficiency.   3.  Severe mitral regurgitation.   4.  Severely decreased left ventricular systolic ejection function with ejection fraction of 15-10%.   5.  End-stage congestive heart failure requiring inotropic support.   6.  Chronic renal disease on hemodialysis.   7.  Ascending aortic aneurysm.   8.  Hypertension.   9.  10, hyperlipidemia   10.  Type 2 diabetes.   11.  Right atrium thrombus formed around the dialysis catheter.        SURGICAL PROCEDURES PERFORMED:   1.  Removal of the right atrium thrombus.    2.  Orthotopic heart transplantation.   3.  The procedure also included resection of the patient's proximal ascending aortic aneurysm.  The reinforcement of the distal aortic stump with bovine pericardial strips.   4.  Placement of ventricular epicardial pacing leads.      SURGEON:  Rony Caputo MD.        FIRST ASSISTANT:  Markell Chance MD.        SECOND ASSISTANT:  Jasper Villagomez PA-C.      INDICATION FOR SURGERY:  As follows:  The patient is a 63-year-old gentleman who has multiple cardiac risk factors and end-stage congestive heart failure.  He has severely decreased LV function with an ejection fraction of 10-15%.  He has ascending aortic aneurysm  due to bicuspid aortic valve, severe AI.  The patient has end-stage congestive heart failure as well as end-stage renal disease on hemodialysis.  Due to worsening congestive heart failure, the patient was admitted to the hospital for inotropic support.  The patient was placed on status 1A heart transplant and renal transplant list.  Today, there is a donor available from the outside of the region and the organ that is available with the heart but the kidney was not available.  After discussion among physicians and with the patient we decided to proceed with only heart transplantation and later the patient will be listed for kidney transplantation after the heart transplantation.  The patient gave consent for surgery, understands that he needs to wait for kidney transplant.      The donor's blood type and the tissue type matched the recipient's according to the UNOS protocol      PROCEDURE:  The patient was brought to the operating room.  He was intubated.  General anesthesia was given.  His chest, abdomen and bilateral lower extremities were prepped and draped in sterile fashion.  We made a median sternotomy incision.  We noted that at this time the donor heart is verified harvest state and with good function and was en route to the [facility].  We opened the median sternotomy incision after the patient was sterilely prepped and draped.  We placed a sternal retractor.  We noted the patient's heart is severely distended with severely decreased LV and RV function.  We gave heparin.  We cannulated the distal ascending aorta with arterial cannula.  We cannulated separately SVC and IVC with 2 separate venous cannulas.  We then induced cardiopulmonary bypass.  We then placed SVC and IVC snares to place patient on total cardiopulmonary bypass.  At this time, the donor arrived to the hospital and we then proceeded with the recipient cardiectomy.  We first transected the ascending aorta.  We removed the  right atrium thrombus from the indwelling renal dialysis catheter.      We transected the SVC segment.  We identified the sinus node.  We excised the conventional sinus node, location was 1.5 cm tissue in the right atrium cava junction.  We also noted that we trimmed the cutting end of the sinus node artery.  But despite sinus node removal, the patient's heart continues beating in normal sinus rhythm.  We then continued to excise another piece of the right atrium tissue along the lateral wall and the second tissue atrium wall is about 3 x 2 cm laterally to the second wall removal, the right atrium removal which is in the proximal lateral aspect along the AV groove and then the patient's heart returned into the junctional rate.  We then excised the third right atrium tissue further and distal lateral aspect.  After that completed, the patient had shown signs of complete junctional rate.  We sent all the specimens for the pathology study.  We then transected the ascending aorta as well as the pulmonary artery at the valvular levels.  We noted the patient's ascending aorta is dilated with aneurysmal changes.  We then cut out more ascending aorta proximal about 1 cm near the aortic cross-clamp.  The end of the distal aortic stump diameter is about 3.4 cm.  We then opened the left atrium and dome with the 11 blade.  We excised the left atrium along the AV groove.  We then transected the IVC about 2 cm above the IVC cannula.  At this time, we excised and removed the heart.  We noted the heart was severely dilated with valvular disease.      We then trimmed the left atrium, pulmonary artery and ascending aorta and both SVC and IVC stumps appropriately.      We brought the donor heart into the operative field.  We quickly inspected the donor heart, we did not find PFO.  We noted some contusion, ecchymosis of the epicardial fat of the right lateral anterior marginal artery area.  There is no structural damage.  We proceeded  with the left atrium anastomosis first.  We used a 3-0 Prolene suture to conduct continuous running anastomosis between the donor and the recipient left atrium stump.  It was conducted in a continuous running suture fashion.  Prior to the completion of the anastomosis and tying of the sutures, we filled left atrium cavity and evacuated the air.  We then tied the sutures.  Next, anastomosis is the inferior vena cava stumps.  We used 4-0 Prolene suture to conduct end-to-end anastomosis between the donor and recipient IVC stumps.  The anastomosis is satisfactory.  We used 2 sutures for the completion of the anastomosis.  We then proceeded with the pulmonary artery anastomosis.  We trimmed the donor and recipient pulmonary artery relatively short and performed the end-to-end anastomosis without any signs of kinking.  It was conducted in a continuous running suture fashion using the everting technique.  The hemostasis is ensured.  We then used almost the entire donor ascending aorta.  We trimmed the recipient ascending aorta as distal as possible to remove all the aneurysmal changes.  We then reinforced the distal end of the patient's native aortic stump with bovine pericardial strips inside and out as buttress sutures.  We then brought up the distal end of the donor heart to perform the end-to-end anastomosis.  We used the everting technique in a 2-layer fashion.  The first layer horizontal mattress and the second layer is continuous running suture anastomosis.  After the completion of the double layer ascending aortic stump anastomosis, we performed aggressive deair measures.  We also gave 500 mg Solu-Medrol and then released the aortic cross-clamp.  With the aortic crossclamp released, we then allowed the heart to be perfused.  We noted the patient went into ventricular fibrillation, so we used the defibrillator to terminate the fibrillation.  The heart then went into the junction of regular rhythm.      We then  proceeded with the SVC stumps anastomosis.  We used 4-0 Prolene suture to conduct end-to-end anastomosis between the donor and the recipient SVC stumps.  It was conducted in a continuous running suture fashion with 2 separate sutures to avoid the pursestring effect.  The anastomosis is quite open and satisfactory.  We then, at this time, gave adequate amount of inotropic reperfusion of the transplanted heart.  The heart started to contract spontaneously with heart rate in the hundreds.  We then were able to wean the patient off the cardiopulmonary bypass with moderate amount of inotropics.  We also placed a pair of ventricular epicardial pacing leads for postoperative backup pacing.  We then weaned the patient off the cardiopulmonary bypass without difficulties.  We performed a MARIANA, which showed good LV function and RV function.  We removed the air.  We gave protamine and achieved good hemostasis.  We then placed a pair of mediastinal drainage tubes.  We closed the sternum with multiple interrupted single and double-lumen strains.  Multiple chest tubes were placed around the heart.  The hemostasis was ensured.  The patient tolerated the procedure well and was transferred to the surgical intensive care unit.         JEROMY DENT MD             D: 06/15/2018   T: 06/15/2018   MT: SAHARA      Name:     SANCHEZ MCNEIL   MRN:      [id number]        Account:        [id number]   :      1955           Procedure Date: 2018      Document: [id number]       cc: Klever Enrst MD       [facility] Surgery Billing

## 2018-06-15 NOTE — CONSULTS
ADVANCED HEART FAILURE CONSULT HISTORY AND PHYSICAL     Reason for consult: Medical management after heart transplant  Requesting team: CV Surgery/CVICU     HPI:  Mr. Murray Nicholson is a 63-year old male with a past medical history of NICM due to valvular heart disease (stage D, on inotropes), CAD, and T2DM who was originally admitted to the [facility] on 04/16/2018 for management of decompensated heart failure.  He was referred to the Advanced HF team today after undergoing OHT.    Mr. Nicholson was on dobutamine 2.5 and dopamine 2.5 pre-operatively.  After undergoing an uneventful anesthetic induction, the procedure necessitated ~140 minutes of cardiopulmonary bypass.  Mr. Nicholson was noted to have mild swelling of the right ventricle intra-operatively.  He was also noted to have an aortic aneurysm that necessitated pericardial pledgets intra-operatively.  Cessation of cardiopulmonary bypass was uneventful.  Mr. Nicholson required three units of FFP, three units of pRBC, and two units of platelets intra-operatively.  He was brought back to the CCU with a LIJV [place] and a temporary pacemaker in-situ.    At the time of the consultation, Mr. Nicholson was intubated and thus unable to participate in the consultation.    ROS could not be obtained due to clinical condition.    PAST MEDICAL HISTORY:  History from chart due to clinical condition  - NICM   - Stage D, on inotropes in the hospital   - Valvular CM due to severe MR and AI   - T2DM   - NIDDM   - Last A1c 7.0 in 04/2018   - ESRD    - IHD via subclavian tunneled line   - Bicuspid aortic valve   - SHEELA  - GERD      PAST SURGICAL HISTORY:  History from chart due to clinical condition  - Laparoscopic inguinal hernia repair  - Laparoscopic peritoneal catheter insertion    FAMILY HISTORY:  History from chart due to clinical condition  - No premature CAD     SOCIAL HISTORY:   History from chart due to clinical condition  - Former smoker of 1 PPD x 19 years. 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  Current infusions: Fentanyl 100 micrograms/h, insulin 7 U/h, isoproterenol 0.3 micrograms/kg/min, propofol 30 micrograms/kg/min.  Current cardiac meds: Methylprednisolone 125 mg q8h IV, mycophenolate 1500 mg IV q12h, nystatin, TMP-SMX     VITAL SIGNS:  T AF,  on Isuprel 0.01, /78, SpO2 99% on  14/500/60/5    PHYSICAL EXAM:  Gen: Intubated, sedated  HEENT: MMM  Resp: Good air entry bilaterally  CVS: No JVD, S1+S2 +flow murmur  Abdo: ND, S, NT, no BS   Extremities: Warm, well-perfused, no edema.  Bounding DP/PT/popliteal pulses bilaterally  Neuro: GCS [id number]    Labs:   Recent Labs   Lab Test  06/15/18   0017   HGB  8.6*   HCT  26.7*   WBC  10.9   MCV  91   MCH  29.3   MCHC  32.2   RDW  17.2*   PLT  155   NA  136   POTASSIUM  4.0   CHLORIDE  96   CO2  24   BUN  21   CR  4.50*   GLC  166*   TRINI  10.0   ALBUMIN  2.8*   BILITOTAL  1.8*   ALKPHOS  88   AST  82*   ALT  25     EKG 06/15/18: Sinus tachycardia with rate approx 110 bpm.   CXR 06/15/18:  1. Endotracheal tube with the tip projects 5.5 cm above frank. [place]ax with tip projects over main pulmonary artery. New right basilar chest tube.  2.  Small right pneumothorax.  3.  Unchanged left retrocardiac opacities, atelectasis versus infection.    ASSESSMENT/PLAN:  Mr. Murray Nicholson is a 63-year old male with a past medical history of NICM (stage D) due to valvular CM who underwent OHT on June 15th, 2018.  The Advanced HF team was consulted for medical management after the transplant.    - Hemodynamic support   - Agree with isoproterenol, VVI pacing (lower rate limit at 80)   - May need addition of epinephrine with addition of CVVHD if MAP drops   - Per Surgery team, to have SBP <130 due to visualized aortic aneurysm    - [place] may need to be pushed  forwards once Surgical team is okay with us manipulating it     - Immunosuppression   - Mycophenolate and methylprednisolone prescribed   - CNI addition to be discussed in the morning     - Infection prophylaxis   - Nystatin daily   - Trimethoprim-sulfamethoxazole twice weekly    - CAV prophylaxis   - Hold aspirin and rosuvastatin until improvement in systemic condition    Felton Phillips   Cardiology Fellow  Pager [phone]

## 2018-06-15 NOTE — PROGRESS NOTES
Pt received HEART transplant on 6/14/18, donor ID [id number], removed from UNOS transplant waitlist.

## 2018-06-15 NOTE — ANESTHESIA PROCEDURE NOTES
MARIANA Probe Insertion Note  Probe Inserted by: SRINI BLISS   Insertion method: MARIANA probe easily inserted   Bite block used:   Yes      Probe type:  Adult 3D   Insertion complications: No complications.      Billing for MARIANA report is being done by Anesthesiology

## 2018-06-15 NOTE — PROCEDURES
Small Bowel Feeding Tube Placement Assessment  Reason for Feeding Tube Placement: request for post-pyloric FT by providers  Cortrak Start Time: 12:48   Cortrak End Time: 13:22  Medicine Delivered During Procedure: lidocaine gel in nares  Placement Successful: Presume post-pyloric (pending AXR confirmation).     Procedure Complications: none  Final Placement Antonio at exit of nare: 113 cm  Face to Face time with patient: 30 mins    Zunilda Deleon RD, LD, CNSC  CVICU Dietitian  Pager: [phone]

## 2018-06-15 NOTE — PROGRESS NOTES
"Cardiology Progress Note 06/15/2018  ===================================================  Assessment & Plan ; Hospital Day #60    Mr. Murray Nicholson is a 63 year old male with a PMH of CAD, ICM (EF of 15-20%), ESRD, bicuspid aortic valve with AI, severe MR, and proximal AAA who was admitted for decompensated heart failure. He underwent OHT on 7/15/2018.        - Hemodynamic support                        - isoproterenol, epi, VVI pacing (lower rate limit at 80)                        - Per Surgery team, to have SBP <130 due to visualized aortic aneurysm      - Immunosuppression                        - Mycophenolate 1500 mg bid, methylprednisolone prescribed prednisone 50 mg bid from - will taper                        - starting Tacro 0.5 mg bid    - Infection prophylaxis  High risk Zach - will need to check infection in 1-2 months                        - Nystatin daily                        - Trimethoprim-sulfamethoxazole twice weekly       - start valganciclovir 450 mg while on CRRT, will change dosage when on HD    CMV/ recepient - / donor +,   EBV + / pending      - CAV prophylaxis                        - Hold aspirin and rosuvastatin until improvement in systemic condition      Discussed with Dr. Austin.     Tunde Coffey MD  Cardiology Fellow  [phone]     =====================================================  Physical Examination    Gen: Intubated, sedated  HEENT: MMM  Resp: Good air entry bilaterally  CVS: No JVD, S1+S2 +flow murmur  Abdo: ND, S, NT, no BS   Extremities: Warm, well-perfused, no edema  =====================================================  Interval history:  No acute events overnight. tapering pressors. Maintaing CO    ==================================================  Objective:  /64  Pulse 107  Temp 97.3  F (36.3  C) (Pulmonary Artery)  Resp 28  Ht 1.727 m (5' 8\")  Wt 78 kg (171 lb 15.3 oz)  SpO2 100%  BMI 26.15 kg/m2  Temp (24hrs), Av.9  F (36.1  C), " Min:95.2  F (35.1  C), Max:98.6  F (37  C)      Arterial Line BP: ()/(33-76) 117/57  MAP:  [45 mmHg-101 mmHg] 79 mmHg  BP - Mean:  [82] 82  CVP:  [6 mmHg-16 mmHg] 16 mmHg  SVO2:  [73 %-86 %] 73 %    Vitals:    06/11/18 0900 06/12/18 0500 06/13/18 0800 06/14/18 0746   Weight: 77.7 kg (171 lb 6.5 oz) 78.8 kg (173 lb 13.3 oz) 76.9 kg (169 lb 10.3 oz) 78 kg (171 lb 15.3 oz)    06/14/18 1040   Weight: 78 kg (171 lb 15.3 oz)     I/O last 3 completed shifts:  In: 5814.17 [I.V.:2479.17; Other:297; NG/GT:130]  Out: 774 [Urine:8; Emesis/NG output:100; Other:314; Chest Tube:352]    Intake/Output Summary (Last 24 hours) at 06/15/18 1607  Last data filed at 06/15/18 1500   Gross per 24 hour   Intake          5800.97 ml   Output              774 ml   Net          5026.97 ml           Wt Readings from Last 2 Encounters:   06/14/18 78 kg (171 lb 15.3 oz)   04/16/18 76.9 kg (169 lb 9.6 oz)       Ventilator Settings: Ventilation Mode: CMV/AC  (Continuous Mandatory Ventilation/ Assist Control)  FiO2 (%): 40 %  Rate Set (breaths/minute): 16 breaths/min  Tidal Volume Set (mL): 500 mL  PEEP (cm H2O): 5 cmH2O  Oxygen Concentration (%): 40 %  Resp: 28      Medications   Current Facility-Administered Medications   Medication Dose Route Frequency     acetaminophen  975 mg Oral Q8H     B and C vitamin Complex with folic acid  5 mL Per Feeding Tube Daily     methylPREDNISolone  125 mg Intravenous Q8H     mycophenolate  1,500 mg Intravenous Q12H     nystatin  1,000,000 Units Oral 4x Daily     pantoprazole (PROTONIX) IV  40 mg Intravenous Daily     [START ON 6/16/2018] predniSONE  40 mg Oral BID    Followed by     [START ON 6/18/2018] predniSONE  35 mg Oral BID    Followed by     [START ON 6/20/2018] predniSONE  30 mg Oral BID    Followed by     [START ON 6/22/2018] predniSONE  25 mg Oral BID    Followed by     [START ON 6/24/2018] predniSONE  20 mg Oral BID     senna-docusate  1 tablet Oral BID    Or     senna-docusate  2 tablet Oral BID      sodium chloride (PF)  3 mL Intracatheter Q8H     [START ON 6/18/2018] sulfamethoxazole-trimethoprim  1 tablet Oral Once per day on Mon Thu    Or     [START ON 6/18/2018] sulfamethoxazole-trimethoprim  20 mL Oral or NG Tube Once per day on Mon Thu     tacrolimus  0.5 mg Oral BID IS     valGANciclovir  450 mg Oral or Feeding Tube Every Other Day     vancomycin (VANCOCIN) IV  1,500 mg Intravenous Q24H     Infusions:    IV fluid REPLACEMENT ONLY       IV fluid REPLACEMENT ONLY       dextrose 5% and 0.45% NaCl 10 mL/hr at 06/15/18 0900     dialysate for CVVHD & CVVHDF (Phoxillum BK4/2.5) 12.5 mL/kg/hr (06/15/18 1303)     EPINEPHrine IV infusion ADULT Stopped (06/15/18 1500)     fentaNYL 100 mcg/hr (06/15/18 1520)     insulin (regular) 2 Units/hr (06/15/18 1500)     isoproterenol (ISUPREL) infusion ADULT 0.06 mcg/kg/min (06/15/18 1500)     norepinephrine 0.01 mcg/kg/min (06/15/18 1500)     replacement solution for CVVHD & CVVHDF (Phoxillum BK4/2.5) 200 mL/hr at 06/15/18 0157     replacement solution for CVVHD & CVVHDF (Phoxillum BK4/2.5) 12.5 mL/kg/hr (06/15/18 1304)     propofol (DIPRIVAN) infusion 20 mcg/kg/min (06/15/18 1500)     Reason beta blocker order not selected       vasopressin (PITRESSIN) infusion ADULT (40 mL) Stopped (06/15/18 0107)     PRN Medications:  [START ON 6/17/2018] acetaminophen, albuterol, calcium chloride, calcium chloride, calcium chloride in 0.9% NaCl intermittent infusion, calcium chloride in 0.9% NaCl intermittent infusion, IV fluid REPLACEMENT ONLY, IV fluid REPLACEMENT ONLY, glucose **OR** dextrose **OR** glucagon, ipratropium - albuterol 0.5 mg/2.5 mg/3 mL, lidocaine 4%, lidocaine (buffered or not buffered), magnesium sulfate, naloxone, ondansetron **OR** ondansetron, oxyCODONE IR, potassium chloride, propofol (DIPRIVAN) infusion **AND** propofol **AND** CK total **AND** Triglycerides, Reason beta blocker order not selected, sodium chloride (PF), sodium phosphate (NaPHOS) CRRT  Replacement  Current Facility-Administered Medications   Medication     [START ON 6/17/2018] acetaminophen (TYLENOL) tablet 650 mg     acetaminophen (TYLENOL) tablet 975 mg     albuterol (PROAIR HFA/PROVENTIL HFA/VENTOLIN HFA) Inhaler 6 puff     B and C vitamin Complex with folic acid (NEPHRONEX) liquid 5 mL     calcium chloride 1 g in sodium chloride 0.9 % 100 mL intermittent infusion     calcium chloride 2 g in sodium chloride 0.9 % 100 mL intermittent infusion     calcium chloride 3 g in sodium chloride 0.9 % 200 mL intermittent infusion     calcium chloride 4 g in sodium chloride 0.9 % 200 mL intermittent infusion     dextrose 10 % 1,000 mL infusion     dextrose 10 % 1,000 mL infusion     dextrose 5% and 0.45% NaCl infusion     glucose gel 15-30 g    Or     dextrose 50 % injection 25-50 mL    Or     glucagon injection 1 mg     dialysate for CVVHD & CVVHDF (Phoxillum BK4/2.5)     EPINEPHrine (ADRENALIN) 5 mg in sodium chloride 0.9 % 250 mL infusion     fentaNYL (SUBLIMAZE) infusion     insulin 1 unit/mL in saline (NovoLIN, HumuLIN Regular) drip - ADULT IV Infusion     ipratropium - albuterol 0.5 mg/2.5 mg/3 mL (DUONEB) neb solution 3 mL     isoproterenol (ISUPREL) 1 mg in D5W 50 mL infusion     lidocaine (LMX4) kit     lidocaine 1 % 1 mL     magnesium sulfate 2 g in water intermittent infusion     methylPREDNISolone sodium succinate (solu-MEDROL) injection 125 mg     mycophenolate (GENERIC EQUIVALENT) 1,500 mg in D5W intermittent infusion     naloxone (NARCAN) injection 0.1-0.4 mg     norepinephrine (LEVOPHED) 16 mg in D5W 250 mL infusion     nystatin (MYCOSTATIN) suspension 1,000,000 Units     ondansetron (ZOFRAN-ODT) ODT tab 4 mg    Or     ondansetron (ZOFRAN) injection 4 mg     oxyCODONE IR (ROXICODONE) tablet 5-10 mg     pantoprazole (PROTONIX) 40 mg IV push injection     POST-filter replacement solution for CVVHD & CVVHDF (Phoxillum BK4/2.5)     potassium chloride 20 mEq in 50 mL intermittent infusion      PRE-filter replacement solution for CVVHD & CVVHDF (Phoxillum BK4/2.5)     [START ON 6/16/2018] predniSONE (DELTASONE) tablet 40 mg    Followed by     [START ON 6/18/2018] predniSONE (DELTASONE) tablet 35 mg    Followed by     [START ON 6/20/2018] predniSONE (DELTASONE) tablet 30 mg    Followed by     [START ON 6/22/2018] predniSONE (DELTASONE) tablet 25 mg    Followed by     [START ON 6/24/2018] predniSONE (DELTASONE) tablet 20 mg     propofol (DIPRIVAN) infusion    And     propofol (DIPRIVAN) injection 10 mg/mL vial     Reason beta blocker order not selected     senna-docusate (SENOKOT-S;PERICOLACE) 8.6-50 MG per tablet 1 tablet    Or     senna-docusate (SENOKOT-S;PERICOLACE) 8.6-50 MG per tablet 2 tablet     sodium chloride (PF) 0.9% PF flush 3 mL     sodium chloride (PF) 0.9% PF flush 3 mL     sodium phosphate 20 mmol in D5W 100 mL intermittent infusion     [START ON 6/18/2018] sulfamethoxazole-trimethoprim (BACTRIM DS/SEPTRA DS) 800-160 MG per tablet 1 tablet    Or     [START ON 6/18/2018] sulfamethoxazole-trimethoprim (BACTRIM/SEPTRA) suspension 160 mg     tacrolimus (GENERIC EQUIVALENT) suspension 0.5 mg     valGANciclovir (VALCYTE) solution 450 mg     vancomycin (VANCOCIN) 1,500 mg in sodium chloride 0.9 % 250 mL intermittent infusion     vasopressin (VASOSTRICT) 40 Units in D5W 40 mL infusion         Labs:  Data   CMP  Recent Labs   Lab Test  06/15/18   0958  06/15/18   0429  06/15/18   0017  06/14/18   2343   06/14/18   1207   NA  135  137  136  138   < >  135   POTASSIUM  4.4  4.1  4.0  4.1   < >  3.8   CHLORIDE  100  97  96   --    --   96   CO2  23  22  24   --    --   28   ANIONGAP  12  18*  15*   --    --   10   GLC  106*  154*  166*  172*   < >  138*   BUN  21  23  21   --    --   25   CR  3.53*  4.13*  4.50*   --    --   4.89*   GFRESTIMATED  18*  15*  13*   --    --   12*   GFRESTBLACK  21*  18*  16*   --    --   15*   TRINI  9.3  9.5  10.0   --    --   9.1   MAG  2.6*  2.6*  2.7*   --    --   2.1    PHOS  4.1  4.5  4.5   --    --   2.9   PROTTOTAL  6.5*  6.5*  6.1*   --    --   7.2   ALBUMIN  3.2*  3.0*  2.8*   --    --   3.0*   BILITOTAL  2.8*  3.2*  1.8*   --    --   1.0   ALKPHOS  81  93  88   --    --   124   AST  100*  103*  82*   --    --   20   ALT  28  29  25   --    --   16    < > = values in this interval not displayed.       CBC  Recent Labs  Lab 06/15/18  1541 06/15/18  0958 06/15/18  0429 06/15/18  0017   WBC 10.8 11.3* 12.0* 10.9   RBC 2.98* 2.94* 3.32* 2.94*   HGB 8.7* 8.5* 9.5* 8.6*   HCT 26.7* 26.3* 30.6* 26.7*   MCV 90 90 92 91   MCH 29.2 28.9 28.6 29.3   MCHC 32.6 32.3 31.0* 32.2   RDW 17.8* 17.7* 17.6* 17.2*   * 156 150 155       Lipid  Recent Labs   Lab Test  04/16/18   1546  04/11/17   0722   08/10/15   0729  04/09/15   0900   CHOL  152  101   < >  198  182   HDL  46  34*   < >  40*  46   LDL  60  38   < >  Cannot estimate LDL when triglyceride exceeds 400 mg/dL  Cannot estimate LDL when triglyceride exceeds 400 mg/dL  85   TRIG  233*  145   < >  421*  426*   CHOLHDLRATIO   --    --    --   5.0  4.0    < > = values in this interval not displayed.       Arterial Blood Gas  Recent Labs  Lab 06/15/18  1541 06/15/18  1019 06/15/18  1018 06/15/18  0429 06/15/18  0145   PH 7.50* 7.48*  --  7.33* 7.34*   PCO2 32* 34*  --  46* 43   PO2 159* 152*  --  132* 170*   HCO3 25 25  --  24 23   O2PER 40 40 40 40.0  40.0 40.0     Venous Blood Gas   Recent Labs  Lab 06/15/18  1541 06/15/18  1019 06/15/18  1018 06/15/18  0429  06/15/18  0017  06/14/18  1437 06/14/18  0549 06/13/18  1731 06/13/18  0601   PHV  --   --  7.45* 7.30*  --  7.33  --  7.51* 7.44* 7.43 7.48*   PCO2V  --   --  30* 48  --  36*  --  41 44 47 42   PO2V  --   --  45 53*  --  61*  --  32 29 31 27   HCO3V  --   --  21 24  --  19*  --  33* 30* 31* 31*   SHELIA  --   --   --   --   --   --   --  9.0 5.2 6.2 7.2   O2PER 40 40 40 40.0  40.0  < > 70.0  70.0  < > 21.0 21.0 21 21.0   < > = values in this interval not displayed.        Microbiology:  Data   Most Recent 6 Bacteria Isolates From Any Culture (See EPIC Reports for Culture Details):  Recent Labs   Lab Test  06/14/18   1949  01/15/18   1704  01/15/18   0000  01/13/18   2330  01/09/18   1212  01/07/18   1911   CULT  Culture negative monitoring continues  Culture negative monitoring continues  Culture negative after 13 hours  PENDING  Candida glabrata  isolated  *  No additional fungi cultured after 4 weeks incubation  On day 2, isolated in broth only:  Staphylococcus epidermidis  *  not isolated or reported on routine culture  No anaerobes isolated  On day 2, isolated in broth only:  Candida glabrata  *  Critical Value/Significant Value, preliminary result only, called to and read back by  Grecia Todd RN at 1049 on 1.17.18 kln.    Candida glabrata  isolated  *  No additional fungi cultured after 4 weeks incubation  No growth  Canceled, Test credited  Incorrectly ordered by PCU/Clinic  Test reordered as correct code  On day 4, isolated in broth only:  Candida glabrata  *  Critical Value/Significant Value, preliminary result only, called to and read back by  Grecia Todd RN at 1049 on 1.17.18 kln.    Susceptibility testing requested by  Dr Vinayak Lawrence ([id number]) on 1.17.18 at 1222.  bw    Heavy growth  Normal tiffany    On day 4, isolated in broth only:  Candida glabrata  *  On day 4, isolated in broth only:  Bacteroides caccae  *  Critical Value/Significant Value, preliminary result only, called to and read back by  Alverto Vasquez RN 7C 1.11.18 @0907 AV    Susceptibility testing not routinely done  Sens.on Candida Glabrata and Bacteroides Caccae per  [id number]     Culture Micro   Date Value Ref Range Status   06/14/2018 PENDING  Preliminary   06/14/2018 Culture negative monitoring continues  Preliminary   06/14/2018 Culture negative after 13 hours  Preliminary   06/14/2018 Culture negative monitoring continues  Preliminary   01/15/2018 (A)  Final    On day 2,  isolated in broth only:  Staphylococcus epidermidis     01/15/2018 not isolated or reported on routine culture  Final   01/15/2018 No anaerobes isolated  Final   01/15/2018 (A)  Final    On day 2, isolated in broth only:  Candida glabrata     01/15/2018   Final    Critical Value/Significant Value, preliminary result only, called to and read back by  Grecia Todd RN at 1049 on 1.17.18 kln.     01/15/2018 Candida glabrata  isolated   (A)  Final   01/15/2018   Final    No additional fungi cultured after 4 weeks incubation   01/15/2018 No growth  Final   01/15/2018 Canceled, Test credited  Final   01/15/2018 Incorrectly ordered by PCU/Clinic  Final   01/15/2018 Test reordered as correct code  Final   01/15/2018 Candida glabrata  isolated   (A)  Final   01/15/2018   Final    No additional fungi cultured after 4 weeks incubation   01/13/2018 (A)  Final    On day 4, isolated in broth only:  Candida glabrata     01/13/2018   Final    Critical Value/Significant Value, preliminary result only, called to and read back by  Grecia Todd RN at 1049 on 1.17.18 kln.     01/13/2018   Final    Susceptibility testing requested by  Dr Vinayak Lawrence ([id number]) on 1.17.18 at 1222.  bw     01/09/2018 Heavy growth  Normal tiffany    Final   01/07/2018 (A)  Final    On day 4, isolated in broth only:  Candida glabrata     01/07/2018 (A)  Final    On day 4, isolated in broth only:  Bacteroides caccae     01/07/2018   Final    Critical Value/Significant Value, preliminary result only, called to and read back by  Alverto Vasquez RN 7C 1.11.18 @0907 AV     01/07/2018 Susceptibility testing not routinely done  Final   01/07/2018   Final    Sens.on Candida Glabrata and Bacteroides Caccae per  [id number]   01/07/2018 No growth  Final   01/07/2018 No growth  Final   06/26/2017 No growth  Final   06/26/2017 No growth  Final   04/12/2017 No growth  Final   04/12/2017 No growth  Final   12/30/2011 No Beta Streptococcus isolated  Final   01/23/2011 No  Beta Streptococcus isolated  Final   10/28/2005 No Beta Streptococcus isolated  Final   02/12/2005 No Beta Streptococcus isolated  Final   12/22/2004 No Beta Streptococcus isolated  Final        Imaging:  Data         Recent Results (from the past 48 hour(s))   XR Chest Port 1 View    Narrative    EXAM: XR CHEST PORT 1 VW  6/14/2018 2:50 PM     HISTORY:  Pre-op heart transplant;        COMPARISON: Chest radiograph 6/11/2018    FINDINGS: Single portable AP view of chest. Stable enlarged cardiac  silhouette. Right dual-lumen dialysis catheter tip over the high right  atrium. Left-sided Murdo-Jax catheter tip over the mid left lower lobe  pulmonary artery. No pneumothorax or stable predominantly bihilar  streaky opacities.      Impression    IMPRESSION:   1. Stable lines and tubes.  2. Stable cardiomegaly.  3. Pulmonary vascular congestion.    I have personally reviewed the examination and initial interpretation  and I agree with the findings.    CLARICE VILLARREAL MD   CT Chest w Cont Abdomen Pelvis w/o & w Cont    Narrative    Exam: Computed tomographic angiography of the chest, abdomen and  pelvis without and with contrast including 3D reformations dated  6/14/2018 3:26 PM    Clinical information: Admission for worsening dyspnea and shortness of  breath, congestive heart failure and cardiomyopathy with ejection  fraction of 10-15%, end-stage renal disease. Pending heart and kidney  transplantation.    Technique: Axial images through the chest and abdomen obtained before  the administration of intravenous contrast media and following the  injection of contrast media  in the arterial phase. Source images  reviewed as well as 3D and multi-planar reconstructions at a 3D  workstation.    Contrast: 150ml isovue 370    DLP: 1261 mGy*cm    Comparison: 4/18/2018.    Findings:      Chest, mediastinum, and lungs: Heart is dilated. There is a  right-sided tunneled dialysis catheter with tip in the low right  atrium. There is a  left-sided PICC line the tip in the upper SVC.  There is a left-sided Columbia-Jax catheter with the tip in the left  lower lobe proximal segmental pulmonary artery. No pericardial  effusion. There are calcifications of the coronary arteries, mitral,  and aortic valves. The descending aorta is dilated, measuring  approximately 4.6 x 4.4 cm in axial plane.    There is motion artifact due to absence of ECG gating. There is no  visible dissection flap. No abnormal soft tissue along the descending  thoracic aorta. There are scattered increase quantity of lymph nodes  in the mediastinum, slightly more prominent than prior. The pulmonary  artery is dilated of 3.7 cm in diameter.    There is a small sliding-type hiatal hernia.    There are scattered groundglass opacities, most pronounced in the  right lower lobe where appears to be a wedged area of hypoattenuation.  Additional scattered groundglass nodules.     The abdominal aorta are in iliac arteries are patent without  dissection or aneurysm. Simple cyst off the anterior mid pole right  kidney.      Impression    Impression:    1. No evidence of aortic dissection.  2. Dilation of the ascending aorta measuring up to 4.7 cm.  3. Groundglass opacities most pronounced in the right lower lobe,  similar to the comparison study, improved from the study from  1/12/2018. Most likely represents residual opacities from an  infectious process.        JENNIE ABREU   XR Chest Port 1 View    Narrative    XR CHEST PORT 1 VW  6/15/2018 1:15 AM    History:  Post Op Heart Transplant; .     Comparison: Chest radiograph dated 6/14/2018    Findings:   Semiupright AP portable chest radiograph. Endotracheal tube with the  tip projects 5.5 cm above frank. Left IJ approach Columbia-Jax catheter  with tip projects over main pulmonary artery. Right central venous  catheter with the tip projects over the right atrium. Left PICC line  with the tip projects over proximal SVC. Gastric tube with tip  beyond  the field of view. New right basilar chest tube.    Cardiac silhouette is slightly enlarged and is stable. Pulmonary  vasculature is indistinct. New left left retrocardiac opacities. Small  right pneumothorax. No significant pleural effusion.      Impression    IMPRESSION:  1.  Endotracheal tube with the tip projects 5.5 cm above frank.  Adel-Jax with tip projects over main pulmonary artery. New right  basilar chest tube.  2.  Small right pneumothorax.  3.  Left retrocardiac opacities, atelectasis versus infection.    I have personally reviewed the examination and initial interpretation  and I agree with the findings.    SUGEY BAEZA MD   XR Abdomen Port 1 View    Narrative    XR ABDOMEN PORT 1 VW  6/15/2018 1:30 AM    History:  OG placement; .     Comparison: CT cap dated 6/14/2018, abdominal radiograph dated 4/28 2018    Findings:   AP portable abdominal radiograph. Part of the left abdomen collimated  outside the field of view. OG tube with the side-port projects around  GE junction. Partially visualized Adel-Jax, mediastinal drainage  tubes, central venous catheter, right basilar chest tube.  Nonobstructive bowel gas pattern. No acute osseous abnormality.      Impression    IMPRESSION:  OG tube with the side-port projects around GE junction, recommend  advancement. Nonobstructive bowel gas pattern.    I have personally reviewed the examination and initial interpretation  and I agree with the findings.    SUGEY BAEZA MD   XR Abdomen Port 1 View    Narrative    Exam: XR ABDOMEN PORT 1 VW, 6/15/2018 2:38 PM    Indication: Verify small bowel feeding tube bedside placement;     Comparison: X-ray 6/15/2018    Findings:   Left IJ Adel-Jax catheter tip is obscured. Feeding tube tip projected  over the proximal jejunum. Enteric tube tip projects at the stomach  with poor visualization of sidehole. Partially visualized mediastinal  drain, right basilar chest tube, left IJ Adel-Jax catheter, right  IJ  central venous catheter, left upper extremity PICC line and sternotomy  wires are unchanged since the chest x-ray from the same date. The  entire abdomen is not in the field-of-view. The visualized abdominal  gas pattern is unremarkable. No pneumatosis or portal venous gas.  Limited evaluation for free air. Please refer to same day chest x-ray  for better characterization of the lungs.      Impression    Impression:   1. Feeding tube tip projecting over the proximal jejunum.  2. Please refer to same day chest x-ray for better characterization of  the lungs and chest support devices.    I have personally reviewed the examination and initial interpretation  and I agree with the findings.    PATO PAULSON MD        LINES/TUBES:  Data   Peripheral IV 06/14/18 Right Hand (Active)   Site Assessment WDL 6/15/2018 12:00 PM   Line Status Saline locked 6/15/2018 12:00 PM   Phlebitis Scale 0-->no symptoms 6/15/2018 12:00 PM   Infiltration Scale 0 6/15/2018 12:00 PM   Extravasation? No 6/15/2018 12:00 PM   Number of days:1       PICC Double Lumen 04/22/18 Left Basilic (Active)   Site Assessment WDL 6/15/2018 12:00 PM   External Cath Length (cm) 4 cm 5/28/2018 12:00 AM   Extremity Circumference (cm) 34 cm 5/4/2018  2:00 PM   Dressing Intervention Chlorhexidine patch;Transparent 6/15/2018 12:00 PM   Extravasation? No 6/15/2018 12:00 PM   Dressing Change Due 06/17/18 6/15/2018 12:00 PM   PICC Comment CDI 6/15/2018 12:00 PM   PICC Lumen Assessment Trigger [place];Purple 6/15/2018  4:00 AM   Number of days:54       Arterial Line 06/14/18 (Active)   Site Assessment WDL 6/15/2018 12:00 PM   Line Status Pulsatile blood flow 6/15/2018 12:00 PM   Art Line Waveform Appropriate 6/15/2018 12:00 PM   Art Line Interventions Leveled 6/15/2018 12:00 PM   Color/Movement/Sensation Capillary refill less than 3 sec 6/15/2018 12:00 PM   Dressing Type Transparent 6/15/2018 12:00 PM   Dressing Status Clean, dry, intact 6/15/2018 12:00 PM    Dressing Intervention Dressing changed/new dressing 6/15/2018  4:00 AM   Dressing Change Due 06/17/18 6/15/2018 12:00 PM   Number of days:1       CVC Single Lumen 05/13/18 Left Internal jugular (Active)   Site Assessment WDL 6/15/2018 12:00 PM   Line Status Infusing 6/15/2018 12:00 PM   Dressing Intervention Chlorhexidine sponge 6/15/2018 12:00 PM   Extravasation? No 6/15/2018 12:00 PM   Dressing Change Due 06/17/18 6/15/2018 12:00 PM   CVC Comment [place] 6/15/2018 12:00 PM   Number of days:33       CVC Double Lumen 04/17/18 Right Internal jugular (Active)   Site Assessment WDL 6/15/2018 12:00 PM   Lumen Soln/Vol REFERENCE 2.1/2.1 6/14/2018  2:30 PM   External Cath Length (cm) 3.5 cm 6/14/2018  2:30 PM   Extravasation? No 6/14/2018  4:00 PM   Dressing Intervention Chlorhexidine sponge;Gauze 6/15/2018 12:00 PM   Dressing Change Due 06/17/18 6/15/2018 12:00 PM   CVC Comment CDI 6/15/2018 12:00 PM   CVC Lumen Assessment Red;Blue 6/15/2018 12:00 PM   Number of days:59       Pulmonary Artery Catheter - Single Lumen 05/13/18 1700 Left internal jugular vein (Active)   Dressing dressing dry and intact 6/15/2018 12:00 PM   Securement secured with sutures 6/15/2018 12:00 PM   PA Catheter Markings (cm) 56 6/15/2018 12:00 PM   Phlebitis 0-->no symptoms 6/15/2018 12:00 PM   Infiltration 0-->no symptoms 6/15/2018 12:00 PM   Waveform normal 6/15/2018 12:00 PM   Pressure Catheter Interventions blood specimen obtained and sent to lab;line leveled/zeroed 6/15/2018  4:00 AM   Daily Review Of Necessity completed 6/15/2018 12:00 PM   Number of days:33     I personally provided care for this patient, reviewed chart, discussed course with patient, housestaff and consulting physicians.  I answered all questions.    Rhett Austin M.D.  Division of Cardiology  Department of Medicine

## 2018-06-15 NOTE — PROGRESS NOTES
CV ICU PROGRESS NOTE  Damaris 15, 2018      CO-MORBIDITIES:   Chronic systolic heart failure (H)  (primary encounter diagnosis)  End stage renal disease (H)  Heart transplant recipient (H)    ASSESSMENT: Murray Nicholson is a 63 year old male CAD, ICM (EF of 15-20%), ESRD on HD, bicuspid aortic valve with AI, severe MR, and proximal AAA who was admitted for decompensated heart failure now POD # 1 s/p heart transplant    TODAY'S PROGRESS:   -feeding tube  - wean pressors as tolerated  - weaning nitric     PLAN:  Neuro/ pain/ sedation:  - Monitor neurological status. Notify the MD for any acute changes in exam.  - Fentanyl for pain.  - Propofol for sedation.    Pulmonary care:   - mechanical vent    Cardiovascular:    - Monitor hemodynamic status.   - Epi (wean to 0.01) and levo keep map > 65  - weaning nitric  - immunosuppression per cardiology    GI care:   - Feeding tube today then advance diet    Fluids/ Electrolytes/ Nutrition:   - no concerns    Renal/ Fluid Balance:    - CRRT    Endocrine:    - Insulin gtt    ID/ Antibiotics:  - No indication for antibiotics.     Heme:     - Hemoglobin stable.     Prophylaxis:    - Mechanical prophylaxis for DVT.  - Protonix    Lines/ tubes/ drains:  - dilaysis catheter, a line, central line, ett, doyle, mac line swan, picc    Disposition:  - CV ICU    Patient seen, findings and plan discussed with CV ICU staff, Dr. Andree Poole MD PGY-3  Surgery Resident    ====================================    SUBJECTIVE:   No acute events overnight    OBJECTIVE:   1. VITAL SIGNS:   Temp:  [95.2  F (35.1  C)-98.4  F (36.9  C)] 97.5  F (36.4  C)  Heart Rate:  [] 98  Resp:  [14-20] 20  BP: ()/(55-84) 121/64  MAP:  [45 mmHg-101 mmHg] 70 mmHg  Arterial Line BP: ()/(33-76) 97/57  FiO2 (%):  [40 %-70 %] 40 %  SpO2:  [100 %] 100 %  Ventilation Mode: CMV/AC  (Continuous Mandatory Ventilation/ Assist Control)  FiO2 (%): 40 %  Rate Set (breaths/minute): 16  breaths/min  Tidal Volume Set (mL): 500 mL  PEEP (cm H2O): 5 cmH2O  Oxygen Concentration (%): 40 %  Resp: 20    2. INTAKE/ OUTPUT:   I/O last 3 completed shifts:  In: 4877.08 [P.O.:75; I.V.:2027.08; Other:297; NG/GT:70]  Out: 2923 [Urine:8; Emesis/NG output:100; Other:2603; Chest Tube:212]    3. PHYSICAL EXAMINATION:   General: Intubated and sedated  Neuro: Intubated and sedated  Resp: Breathing non-labored on vent  CV: RRR  Abdomen: Soft, Non-distended, Non-tender  Incisions: dressing is c/d/i  Extremities: warm and well perfused    4. INVESTIGATIONS:   Arterial Blood Gases     Recent Labs  Lab 06/15/18  0429 06/15/18  0145 06/15/18  0017 06/14/18  2343   PH 7.33* 7.34* 7.36 7.38   PCO2 46* 43 41 38   PO2 132* 170* 246* 324*   HCO3 24 23 23 22     Complete Blood Count     Recent Labs  Lab 06/15/18  0429 06/15/18  0017 06/14/18  2343 06/14/18  2324 06/14/18  2220  06/14/18  1207 06/14/18  0549   WBC 12.0* 10.9  --   --   --   --  5.2 4.7   HGB 9.5* 8.6* 8.1* 6.8* 8.0*  < > 9.5* 8.9*    155  --   --  127*  --  236 202   < > = values in this interval not displayed.  Basic Metabolic Panel    Recent Labs  Lab 06/15/18  0429 06/15/18  0017 06/14/18  2343 06/14/18  2324  06/14/18  1207 06/14/18  0549    136 138 137  < > 135 134   POTASSIUM 4.1 4.0 4.1 3.9  < > 3.8 5.2   CHLORIDE 97 96  --   --   --  96 96   CO2 22 24  --   --   --  28 26   BUN 23 21  --   --   --  25 42*   CR 4.13* 4.50*  --   --   --  4.89* 8.01*   * 166* 172* 173*  < > 138* 109*   < > = values in this interval not displayed.  Liver Function Tests    Recent Labs  Lab 06/15/18  0429 06/15/18  0017 06/14/18  2220 06/14/18  1207 06/10/18  2144   * 82*  --  20 16   ALT 29 25  --  16 16   ALKPHOS 93 88  --  124 121   BILITOTAL 3.2* 1.8*  --  1.0 0.7   ALBUMIN 3.0* 2.8*  --  3.0* 2.7*   INR  --  1.36* 1.40* 1.14  --      Pancreatic Enzymes    Recent Labs  Lab 06/14/18  1207   AMYLASE 62     Coagulation Profile    Recent Labs  Lab  06/15/18  0017 06/14/18  2220 06/14/18  1207   INR 1.36* 1.40* 1.14   PTT 38* 36 59*         5. RADIOLOGY:   Recent Results (from the past 24 hour(s))   XR Chest Port 1 View    Narrative    EXAM: XR CHEST PORT 1 VW  6/14/2018 2:50 PM     HISTORY:  Pre-op heart transplant;        COMPARISON: Chest radiograph 6/11/2018    FINDINGS: Single portable AP view of chest. Stable enlarged cardiac  silhouette. Right dual-lumen dialysis catheter tip over the high right  atrium. Left-sided [place]-Jax catheter tip over the mid left lower lobe  pulmonary artery. No pneumothorax or stable predominantly bihilar  streaky opacities.      Impression    IMPRESSION:   1. Stable lines and tubes.  2. Stable cardiomegaly.  3. Pulmonary vascular congestion.    I have personally reviewed the examination and initial interpretation  and I agree with the findings.    CLARICE VILLARREAL MD   CT Chest w Cont Abdomen Pelvis w/o & w Cont    Narrative    Exam: Computed tomographic angiography of the chest, abdomen and  pelvis without and with contrast including 3D reformations dated  6/14/2018 3:26 PM    Clinical information: Admission for worsening dyspnea and shortness of  breath, congestive heart failure and cardiomyopathy with ejection  fraction of 10-15%, end-stage renal disease. Pending heart and kidney  transplantation.    Technique: Axial images through the chest and abdomen obtained before  the administration of intravenous contrast media and following the  injection of contrast media  in the arterial phase. Source images  reviewed as well as 3D and multi-planar reconstructions at a 3D  workstation.    Contrast: 150ml isovue 370    DLP: 1261 mGy*cm    Comparison: 4/18/2018.    Findings:      Chest, mediastinum, and lungs: Heart is dilated. There is a  right-sided tunneled dialysis catheter with tip in the low right  atrium. There is a left-sided PICC line the tip in the upper SVC.  There is a left-sided [place]-Jax catheter with the tip in the  left  lower lobe proximal segmental pulmonary artery. No pericardial  effusion. There are calcifications of the coronary arteries, mitral,  and aortic valves. The descending aorta is dilated, measuring  approximately 4.6 x 4.4 cm in axial plane.    There is motion artifact due to absence of ECG gating. There is no  visible dissection flap. No abnormal soft tissue along the descending  thoracic aorta. There are scattered increase quantity of lymph nodes  in the mediastinum, slightly more prominent than prior. The pulmonary  artery is dilated of 3.7 cm in diameter.    There is a small sliding-type hiatal hernia.    There are scattered groundglass opacities, most pronounced in the  right lower lobe where appears to be a wedged area of hypoattenuation.  Additional scattered groundglass nodules.     The abdominal aorta are in iliac arteries are patent without  dissection or aneurysm. Simple cyst off the anterior mid pole right  kidney.      Impression    Impression:    1. No evidence of aortic dissection.  2. Dilation of the ascending aorta measuring up to 4.7 cm.  3. Groundglass opacities most pronounced in the right lower lobe,  similar to the comparison study, improved from the study from  1/12/2018. Most likely represents residual opacities from an  infectious process.        JENNIE ABREU   XR Chest Port 1 View    Narrative    1.  Endotracheal tube with the tip projects 5.5 cm above frank.  [place]-Jax with tip projects over main pulmonary artery. New right  basilar chest tube.  2.  Small right pneumothorax.  3.  Unchanged left retrocardiac opacities, atelectasis versus  infection.   XR Abdomen Port 1 View    Narrative    XR ABDOMEN PORT 1 VW  6/15/2018 1:30 AM    History:  OG placement; .     Comparison: CT cap dated 6/14/2018, abdominal radiograph dated 4/28 2018    Findings:   AP portable abdominal radiograph. Part of the left abdomen collimated  outside the field of view. OG tube with the side-port projects  around  GE junction. Partially visualized [place]-Jax, mediastinal drainage  tubes, central venous catheter, right basilar chest tube.  Nonobstructive bowel gas pattern. No acute osseous abnormality.      Impression    IMPRESSION:  OG tube with the side-port projects around GE junction, recommend  advancement. Nonobstructive bowel gas pattern.    I have personally reviewed the examination and initial interpretation  and I agree with the findings.    SUGEY BAEZA MD       =========================================

## 2018-06-15 NOTE — PHARMACY
Pharmacy Empiric Dose Change Per Policy  Original Dose Ordered: 1000 mg q12 hours  Dose Changed To: 1500 mg q24 hours    This dose change was based on the pharmacist's assessment of this patient's age, weight, concurrent drug therapy, treatment goals, whether patient's creatinine clearance adequately indicates renal function (factoring in age, muscle mass, fluid and clinical status), and, if applicable, prior pharmacokinetic data.      Estimated Creatinine Clearance: 20.2 mL/min (based on Cr of 4.13). - on CRRT (on hemodialysis prior to transplant)    Dose adjusted as patient is on CRRT. Will schedule for 2 doses to complete usual post-operative prophylaxis.    Will continue to follow and modify dosage according to levels, organ function and clinical condition.    Jordi Johns, PharmD  PGY-2 Critical Care Pharmacy Resident

## 2018-06-15 NOTE — ANESTHESIA PROCEDURE NOTES
PA Catheter Insertion Note  Anesthesiologist: SRINI BLISS  Resident:  ROLAN LORA   PA Catheter placed by resident/CRNA in the presence of a teaching physician.  Introducer: Introducer already in place.   Skin prep:  Chloraprep Cap, Full body drape, hand hygiene, Mask, Sterile gloves and Sterile gown    PA Catheter type:  CCO    Distance catheter advanced:  15 cm.    Appropriate RA, RV, PA  waveforms?: Yes    Dressing:  Biopatch and Tegaderm    Complications:  None apparent        PA catheter notes:  [place] Jax to be advanced to PA after transplant

## 2018-06-15 NOTE — PROGRESS NOTES
Pt arrived from OR intubated by 8.0 ETT secured 26 @ lip and was placed on initial vent settings of CMV 14 500 60% +5 with 20 of NO.  Breaths sounds slightly coarse and diminished

## 2018-06-15 NOTE — PHARMACY-CONSULT NOTE
Pharmacy Tube Feeding Consult    Medication reviewed for administration by feeding tube and for potential food/drug interactions.    Recommendation: No changes are needed at this time.     Pharmacy will continue to follow as new medications are ordered.    Paulina Fung, PharmD  Damaris 15, 2018

## 2018-06-15 NOTE — ANESTHESIA CARE TRANSFER NOTE
Patient: Murray Nicholson    Procedure(s):  Median Sternotomy, on-pump oxygenator, Heart Transplant    Diagnosis: Myopathy  Diagnosis Additional Information: No value filed.    Anesthesia Type:   No value filed.     Note:  Airway :ETT  Patient transferred to:ICU  Comments:     Patient kept intubated at end of procedure. Vitals stable on infusions as noted in intraoperative record. Patient was started on sedation for transport with Propofol. Patient switched over to transport monitors and hand ventilated on Ambu Bag connected to high flow oxygen. Vitals were stable and monitored en route to ICU. Patient transferred to ICU room, vitals stable. Patient switched over to ventilator and room monitors. Report given to ICU RN and care team without incident.     Last set of vitals documented in intraoperative record.    No pressor needs on transport.    Infusions at time of transfer in ICU:  Maintenance infusion of LR @75ml/hr  Isoproterenol 0.03mcg/kg/min  Propofol 30mcg/kg/min  Insulin 7 units/hr  Amicar 1g/hr    Remainder of PRBC one unit infusing.  NO reversal of paralysis given, likely residual paralysis for several hours due to ESRD.     ICU Handoff: Call for PAUSE to initiate/utilize ICU HANDOFF, Identified Patient, Identified Responsible Provider, Reviewed the Pertinent Medical History, Discussed Surgical Course, Reviewed Intra-OP Anesthesia Management and Issues during Anesthesia, Set Expectations for Post Procedure Period and Allowed Opportunity for Questions and Acknowledgement of Understanding      Vitals: (Last set prior to Anesthesia Care Transfer)    CRNA VITALS  6/14/2018 2332 - 6/15/2018 0032      6/15/2018             ART BP: 104/70    ART Mean: 68    Ht Rate: 104                Electronically Signed By: Keyur Schneider MD  Damaris 15, 2018  12:36 AM

## 2018-06-15 NOTE — PROGRESS NOTES
CVTS PROGRESS NOTE  Damaris 15, 2018      CO-MORBIDITIES:   Chronic systolic heart failure (H)  (primary encounter diagnosis)  End stage renal disease (H)  Heart transplant recipient (H)    ASSESSMENT: Murray Nicholson is a 63 year old male CAD, ICM (EF of 15-20%), ESRD on HD, bicuspid aortic valve with AI, severe MR, and proximal AAA who was admitted for decompensated heart failure now POD # 1 s/p heart transplant    TODAY'S PROGRESS:   -feeding tube  - wean pressors as tolerated  - weaning nitric     PLAN:  Neuro/ pain/ sedation:  - Monitor neurological status. Notify the MD for any acute changes in exam.  - Fentanyl for pain.  - Propofol for sedation.    Pulmonary care:   - mechanical vent    Cardiovascular:    - Monitor hemodynamic status.   - Epi (wean to 0.01) and levo keep map > 65  - weaning nitric  - immunosuppression per cardiology    GI care:   - Feeding tube today then advance diet    Fluids/ Electrolytes/ Nutrition:   - no concerns    Renal/ Fluid Balance:    - CRRT    Endocrine:    - Insulin gtt    ID/ Antibiotics:  - No indication for antibiotics.     Heme:     - Hemoglobin stable.     Prophylaxis:    - Mechanical prophylaxis for DVT.  - Protonix    Lines/ tubes/ drains:  - dilaysis catheter, a line, central line, ett, doyle, mac line swan, picc    Disposition:  - CV ICU    Patient seen, findings and plan discussed with CVTS fellow    Anamaria Poole MD PGY-3  Surgery Resident    ====================================    SUBJECTIVE:   No acute events overnight    OBJECTIVE:   1. VITAL SIGNS:   Temp:  [95.2  F (35.1  C)-98.4  F (36.9  C)] 97.5  F (36.4  C)  Heart Rate:  [] 98  Resp:  [14-20] 20  BP: ()/(55-84) 121/64  MAP:  [45 mmHg-101 mmHg] 70 mmHg  Arterial Line BP: ()/(33-76) 97/57  FiO2 (%):  [40 %-70 %] 40 %  SpO2:  [100 %] 100 %  Ventilation Mode: CMV/AC  (Continuous Mandatory Ventilation/ Assist Control)  FiO2 (%): 40 %  Rate Set (breaths/minute): 16 breaths/min  Tidal Volume Set  (mL): 500 mL  PEEP (cm H2O): 5 cmH2O  Oxygen Concentration (%): 40 %  Resp: 20    2. INTAKE/ OUTPUT:   I/O last 3 completed shifts:  In: 4877.08 [P.O.:75; I.V.:2027.08; Other:297; NG/GT:70]  Out: 2923 [Urine:8; Emesis/NG output:100; Other:2603; Chest Tube:212]    3. PHYSICAL EXAMINATION:   General: Intubated and sedated  Neuro: Intubated and sedated  Resp: Breathing non-labored on vent  CV: RRR  Abdomen: Soft, Non-distended, Non-tender  Incisions: dressing is c/d/i  Extremities: warm and well perfused    4. INVESTIGATIONS:   Arterial Blood Gases     Recent Labs  Lab 06/15/18  0429 06/15/18  0145 06/15/18  0017 06/14/18  2343   PH 7.33* 7.34* 7.36 7.38   PCO2 46* 43 41 38   PO2 132* 170* 246* 324*   HCO3 24 23 23 22     Complete Blood Count     Recent Labs  Lab 06/15/18  0429 06/15/18  0017 06/14/18  2343 06/14/18  2324 06/14/18  2220  06/14/18  1207 06/14/18  0549   WBC 12.0* 10.9  --   --   --   --  5.2 4.7   HGB 9.5* 8.6* 8.1* 6.8* 8.0*  < > 9.5* 8.9*    155  --   --  127*  --  236 202   < > = values in this interval not displayed.  Basic Metabolic Panel    Recent Labs  Lab 06/15/18  0429 06/15/18  0017 06/14/18  2343 06/14/18  2324  06/14/18  1207 06/14/18  0549    136 138 137  < > 135 134   POTASSIUM 4.1 4.0 4.1 3.9  < > 3.8 5.2   CHLORIDE 97 96  --   --   --  96 96   CO2 22 24  --   --   --  28 26   BUN 23 21  --   --   --  25 42*   CR 4.13* 4.50*  --   --   --  4.89* 8.01*   * 166* 172* 173*  < > 138* 109*   < > = values in this interval not displayed.  Liver Function Tests    Recent Labs  Lab 06/15/18  0429 06/15/18  0017 06/14/18  2220 06/14/18  1207 06/10/18  2144   * 82*  --  20 16   ALT 29 25  --  16 16   ALKPHOS 93 88  --  124 121   BILITOTAL 3.2* 1.8*  --  1.0 0.7   ALBUMIN 3.0* 2.8*  --  3.0* 2.7*   INR  --  1.36* 1.40* 1.14  --      Pancreatic Enzymes    Recent Labs  Lab 06/14/18  1207   AMYLASE 62     Coagulation Profile    Recent Labs  Lab 06/15/18  0017 06/14/18 2220  06/14/18  1207   INR 1.36* 1.40* 1.14   PTT 38* 36 59*         5. RADIOLOGY:   Recent Results (from the past 24 hour(s))   XR Chest Port 1 View    Narrative    EXAM: XR CHEST PORT 1 VW  6/14/2018 2:50 PM     HISTORY:  Pre-op heart transplant;        COMPARISON: Chest radiograph 6/11/2018    FINDINGS: Single portable AP view of chest. Stable enlarged cardiac  silhouette. Right dual-lumen dialysis catheter tip over the high right  atrium. Left-sided [place]-Jax catheter tip over the mid left lower lobe  pulmonary artery. No pneumothorax or stable predominantly bihilar  streaky opacities.      Impression    IMPRESSION:   1. Stable lines and tubes.  2. Stable cardiomegaly.  3. Pulmonary vascular congestion.    I have personally reviewed the examination and initial interpretation  and I agree with the findings.    CLARICE VILLARREAL MD   CT Chest w Cont Abdomen Pelvis w/o & w Cont    Narrative    Exam: Computed tomographic angiography of the chest, abdomen and  pelvis without and with contrast including 3D reformations dated  6/14/2018 3:26 PM    Clinical information: Admission for worsening dyspnea and shortness of  breath, congestive heart failure and cardiomyopathy with ejection  fraction of 10-15%, end-stage renal disease. Pending heart and kidney  transplantation.    Technique: Axial images through the chest and abdomen obtained before  the administration of intravenous contrast media and following the  injection of contrast media  in the arterial phase. Source images  reviewed as well as 3D and multi-planar reconstructions at a 3D  workstation.    Contrast: 150ml isovue 370    DLP: 1261 mGy*cm    Comparison: 4/18/2018.    Findings:      Chest, mediastinum, and lungs: Heart is dilated. There is a  right-sided tunneled dialysis catheter with tip in the low right  atrium. There is a left-sided PICC line the tip in the upper SVC.  There is a left-sided [place]-Jax catheter with the tip in the left  lower lobe proximal  segmental pulmonary artery. No pericardial  effusion. There are calcifications of the coronary arteries, mitral,  and aortic valves. The descending aorta is dilated, measuring  approximately 4.6 x 4.4 cm in axial plane.    There is motion artifact due to absence of ECG gating. There is no  visible dissection flap. No abnormal soft tissue along the descending  thoracic aorta. There are scattered increase quantity of lymph nodes  in the mediastinum, slightly more prominent than prior. The pulmonary  artery is dilated of 3.7 cm in diameter.    There is a small sliding-type hiatal hernia.    There are scattered groundglass opacities, most pronounced in the  right lower lobe where appears to be a wedged area of hypoattenuation.  Additional scattered groundglass nodules.     The abdominal aorta are in iliac arteries are patent without  dissection or aneurysm. Simple cyst off the anterior mid pole right  kidney.      Impression    Impression:    1. No evidence of aortic dissection.  2. Dilation of the ascending aorta measuring up to 4.7 cm.  3. Groundglass opacities most pronounced in the right lower lobe,  similar to the comparison study, improved from the study from  1/12/2018. Most likely represents residual opacities from an  infectious process.        JENNIE ABREU   XR Chest Port 1 View    Narrative    1.  Endotracheal tube with the tip projects 5.5 cm above frank.  [place]-Jax with tip projects over main pulmonary artery. New right  basilar chest tube.  2.  Small right pneumothorax.  3.  Unchanged left retrocardiac opacities, atelectasis versus  infection.   XR Abdomen Port 1 View    Narrative    XR ABDOMEN PORT 1 VW  6/15/2018 1:30 AM    History:  OG placement; .     Comparison: CT cap dated 6/14/2018, abdominal radiograph dated 4/28 2018    Findings:   AP portable abdominal radiograph. Part of the left abdomen collimated  outside the field of view. OG tube with the side-port projects around  GE junction.  Partially visualized [place]-Jax, mediastinal drainage  tubes, central venous catheter, right basilar chest tube.  Nonobstructive bowel gas pattern. No acute osseous abnormality.      Impression    IMPRESSION:  OG tube with the side-port projects around GE junction, recommend  advancement. Nonobstructive bowel gas pattern.    I have personally reviewed the examination and initial interpretation  and I agree with the findings.    SUGEY BAEZA MD       =========================================

## 2018-06-15 NOTE — PROGRESS NOTES
CLINICAL NUTRITION SERVICES - REASSESSMENT NOTE     Nutrition Prescription    RECOMMENDATIONS FOR MDs/PROVIDERS TO ORDER:  None today    Malnutrition Status:    Non-severe malnutrition in the context of chronic illness    Recommendations already ordered by Registered Dietitian (RD):  1. Begin Nepro @ 10 mL/hr and adv 10 mL q4h to goal 60 ml/hr (1440 ml/day) to provide 2592 kcals (35 kcal/kg/day), 117 g PRO (1.6 g/kg/day), 1051 ml free H2O, 232 g CHO and 18 g Fiber daily   2. Free water flushes 30 mL q4h for patency.  3. Nephronex to ensure adequate micronutrients in case of slow TF adv, EN interruptions, hypermetabolic needs.    Future/Additional Recommendations:  If remains intubated, consider obtain metabolic cart study to assess approp of energy provisions to avoid over/under feeding.   **consulted to assess and order TF    EVALUATION OF THE PROGRESS TOWARD GOALS   Diet:  NPO; previously Regular, 2000 mL fluid restriction, Nepro PRN  Intake: Meal intake documented as 100% of meals over past week. Appetite good, eating well per RN notes/flowsheet.       NEW FINDINGS   -Resp/CV: intubated s/p heart transplant. Lactic acid 5.6, on Epi and Norepi  -Skin: David 12.  -Renal: CRRT, K+ 4.1 and phos 4.5    ASSESSED NUTRITION NEEDS   Estimated Protein Needs: 113 - 150 grams protein/day (1.5 - 2 grams of pro/kg)  Justification: CRRT    MALNUTRITION  % Intake: No decreased intake noted  % Weight Loss: None noted, current wt 78 kg remains >lowest admit wt 74.7 kg on 4/21  Subcutaneous Fat Loss: Facial region and Lower arm: mild   Muscle Loss: Temporal, Thoracic region (clavicle, acromium bone, deltoid, trapezius, pectoral), Upper arm (bicep, tricep) and Lower arm  (forearm): moderate   Fluid Accumulation/Edema: Does not meet criteria  Malnutrition Diagnosis: Non-severe malnutrition in the context of chronic illness    Previous Goals   Pt to consume at least 1 protein source at TID meals + ~1 protein supplement or additional  snack daily.  Evaluation: Likely met    Previous Nutrition Diagnosis  Predicted inadequate nutrient intake (protein) related to increased needs on HD AEB historically had difficulties w/ menu options and eating enough protein foods  Evaluation: Ongoing    CURRENT NUTRITION DIAGNOSIS  Predicted inadequate nutrient intake (kcal/protein) related to increased needs, intubated post-op AEB potential for prolonged NPO pending vent wean plans    INTERVENTIONS  Implementation  Collaboration with other providers - Discussed starting TF, placing FT on rounds  Enteral Nutrition, Feeding Tube Flush, multivitamin - ordered above regimen    Goals  Total average nutritional intakes to meet minimum 30 kcal/kg and 1.5 g/kg PRO (per 75 kg dosing wt)    Monitoring/Evaluation  Progress toward goals will be monitored and evaluated per protocol.     Zunilda Deleon, RD, LD, CNSC  CVICU Dietitian  Pager: [phone]

## 2018-06-15 NOTE — BRIEF OP NOTE
[facility], [place]    Brief Operative Note    Pre-operative diagnosis: Heart failure  Post-operative diagnosis Heart failure  Procedure: Procedure(s):  Median Sternotomy, on-pump oxygenator, Heart Transplant  Surgeon: Surgeon(s) and Role:     * Rony Caputo MD - Primary     * Marquis Villagomez PA-C - Assisting     * Angelito Chance MD - Assisting  Anesthesia: General   Estimated blood loss: 1000mL  Drains: Right pleural chest tube, mediastinal chest tubes x2  Specimens:   ID Type Source Tests Collected by Time Destination   1 : Thrombis from end of dialysis catheter Tissue Other AFB CULTURE NON BLOOD, AFB STAIN NON BLOOD, ANAEROBIC BACTERIAL CULTURE, FUNGUS CULTURE, GRAM STAIN, TISSUE CULTURE AEROBIC BACTERIAL Rony Caputo MD 6/14/2018  7:49 PM    A : SA node Tissue Other SURGICAL PATHOLOGY EXAM Rony Caputo MD 6/14/2018  7:54 PM    B : Right Atrium 1 Tissue Heart SURGICAL PATHOLOGY EXAM Rony Caputo MD 6/14/2018  7:54 PM    C : Right Atrium 2 Tissue Heart SURGICAL PATHOLOGY EXAM Rony Caputo MD 6/14/2018  7:55 PM    D : NATIVE HEART Tissue Heart SURGICAL PATHOLOGY EXAM Rony Caputo MD 6/14/2018 10:55 PM      Findings:   Good function on implanted heart  Complications: none  Implants: heart

## 2018-06-15 NOTE — PROCEDURES
Bridle Placement:   Reason for bridle placement: securement of FT   Medicine delivered during procedure: lubricating jelly   Procedure: Successful  Location of top of clip on FT: @ 114 cm marker   Condition of nose/skin at time of bridle placement: Unremarkable   Face to Face time with patient: <5 minutes.    Zunilda Deleon RD, LD, [facility]  CVICU Dietitian  Pager: [phone]

## 2018-06-15 NOTE — PHARMACY
Adult Heart Transplant Post Operative Note     63 year old male  s/p heart transplant on 6/14/18 for nonischemic cardiomyopathy.  Planned immunosuppression regimen to include cyclosporine (goal 200 mcg/L), mycophenolate and prednisone.  Opportunistic pathogen prophylaxis includes: trimethoprim/sulfamethoxazole and valganciclovir with nystatin to start in the future.  Patient is not enrolled in medication study.    Patient with planned immunosuppression and prophylaxis as above.  Pharmacy will monitor for medication interactions and immunosuppression levels in conjunction with the team. Medication therapy needs for discharge planning will continue to be addressed throughout the current admission via multidisciplinary rounds and order review. Pharmacy will make recommendations as appropriate.    Jordi Johns, PharmD  PGY-2 Critical Care Pharmacy Resident

## 2018-06-15 NOTE — PROGRESS NOTES
Nephrology Progress Note  06/15/2018       Murray Nicholson is a 63 year old year old male with PMH of HTN, DMII, functionally bicuspid aortic valve, CHF/CM (EF 10-15%), ESRD, admitted with worsening dyspnea/SOB, now S/P heart tx 6/14 and started on CRRT, Nephrology managing HD/CRRT.    Interval History  Mr Nicholson had heart tx yesterday post HD, started on CRRT overnight to manage volume immediately post op.  Recovering well overall, still on vent and low doses of pressors this am but likely can wean off by tomorrow.  Continuing CRRT for today, can hold on restarting if circuit clots as hemodynamics are improving and is ~2kg above EDW.      Assessment & Recommendations:   ESRD: due to DM, on PD since Aug 2017, changed to HD 1/18, now TTS, at [facility] under care Dr Mcpherson, [facility] tunnel, EDW 75.5-76 kg, 3.5 hrs.   --Started on CRRT overnight for volume management post heart tx, continuing for today but can likely stop tomorrow depending on intake      Volume-EDW 76 kg.  Up a bit at 78kg although this is a bed wt, CVP 10, PA 32/13.  Continuing CRRT for today, if pressors wean off can likely transition to iHD tomorrow depending on intake.         BMD- Ca 9.3, alb 3.2, phos 4.1      Heart Tx-Still on inotropes and chronotropic gtt's post surgery, started on mycophenolate and methylpred.    Anemia-Cont venofer 100 mg Qtues, cont Epo 4000 u with dialysis once changed to iHD.      Nutrition-Deferred for today, was taking PO prior.      Discussed with Dr Ortega    Recommendations were communicated to team via verbal communication.     Moris Sevilla  Clinical Nurse Specialist  [phone]    Review of Systems:   I reviewed the following systems:  ROS not done due to vent/sedation.    Physical Exam:   I/O last 3 completed shifts:  In: 4877.08 [P.O.:75; I.V.:2027.08; Other:297; NG/GT:70]  Out: 2923 [Urine:8; Emesis/NG output:100; Other:2603; Chest Tube:212]   /64  Pulse 107  Temp 97.5  F (36.4  C) (Pulmonary Artery)  " Resp 20  Ht 1.727 m (5' 8\")  Wt 78 kg (171 lb 15.3 oz)  SpO2 100%  BMI 26.15 kg/m2     GENERAL APPEARANCE: Intubated and sedated  EYES:  No scleral icterus, pupils equal  HENT: mouth without ulcers or lesions  PULM: lungs clear to auscultation, equal air movement, no cyanosis or clubbing  CV: regular rhythm, normal rate, no rub     -JVP not elevated     -edema trace LE.  GI: soft, non-tender, not distended, bowel sounds are +  MS: no evidence of inflammation in joints, no muscle tenderness  NEURO: Intubated and sedated   Lines-tunneled HD line    Labs:   All labs reviewed by me  Electrolytes/Renal -   Recent Labs   Lab Test  06/15/18   0429  06/15/18   0017  06/14/18   2343   06/14/18   1207   NA  137  136  138   < >  135   POTASSIUM  4.1  4.0  4.1   < >  3.8   CHLORIDE  97  96   --    --   96   CO2  22  24   --    --   28   BUN  23  21   --    --   25   CR  4.13*  4.50*   --    --   4.89*   GLC  154*  166*  172*   < >  138*   TRINI  9.5  10.0   --    --   9.1   MAG  2.6*  2.7*   --    --   2.1   PHOS  4.5  4.5   --    --   2.9    < > = values in this interval not displayed.       CBC -   Recent Labs   Lab Test  06/15/18   0429  06/15/18   0017  06/14/18   2343   06/14/18   2220   06/14/18   1207   WBC  12.0*  10.9   --    --    --    --   5.2   HGB  9.5*  8.6*  8.1*   < >  8.0*   < >  9.5*   PLT  150  155   --    --   127*   --   236    < > = values in this interval not displayed.       LFTs -   Recent Labs   Lab Test  06/15/18   0429  06/15/18   0017  06/14/18   1207   ALKPHOS  93  88  124   BILITOTAL  3.2*  1.8*  1.0   ALT  29  25  16   AST  103*  82*  20   PROTTOTAL  6.5*  6.1*  7.2   ALBUMIN  3.0*  2.8*  3.0*       Iron Panel -   Recent Labs   Lab Test  05/08/18   0954  07/19/17   1306  07/05/17   1204   IRON  58  46  26*   IRONSAT  27  18  12*   ALBERTINA  621*  369  542*           Current Medications:    acetaminophen  975 mg Oral Q8H     methylPREDNISolone  125 mg Intravenous Q8H     mycophenolate  1,500 mg " Intravenous Q12H     nystatin  1,000,000 Units Oral 4x Daily     pantoprazole (PROTONIX) IV  40 mg Intravenous Daily     senna-docusate  1 tablet Oral BID    Or     senna-docusate  2 tablet Oral BID     sodium chloride (PF)  3 mL Intracatheter Q8H     [START ON 6/18/2018] sulfamethoxazole-trimethoprim  1 tablet Oral Once per day on Mon Thu    Or     [START ON 6/18/2018] sulfamethoxazole-trimethoprim  20 mL Oral or NG Tube Once per day on Mon Thu     tacrolimus  0.5 mg Oral BID IS     vancomycin (VANCOCIN) IV  1,000 mg Intravenous Q12H       IV fluid REPLACEMENT ONLY       dextrose 5% and 0.45% NaCl 10 mL/hr at 06/15/18 0900     dialysate for CVVHD & CVVHDF (Phoxillum BK4/2.5) 12.5 mL/kg/hr (06/15/18 0749)     EPINEPHrine IV infusion ADULT 0.02 mcg/kg/min (06/15/18 0800)     fentaNYL 100 mcg/hr (06/15/18 0800)     insulin (regular) 3 Units/hr (06/15/18 0845)     isoproterenol (ISUPREL) infusion ADULT 0.04 mcg/kg/min (06/15/18 0949)     norepinephrine 0.01 mcg/kg/min (06/15/18 0800)     replacement solution for CVVHD & CVVHDF (Phoxillum BK4/2.5) 200 mL/hr at 06/15/18 0157     replacement solution for CVVHD & CVVHDF (Phoxillum BK4/2.5) 12.5 mL/kg/hr (06/15/18 0755)     propofol (DIPRIVAN) infusion 30 mcg/kg/min (06/15/18 0800)     Reason beta blocker order not selected       vasopressin (PITRESSIN) infusion ADULT (40 mL) Stopped (06/15/18 0107)

## 2018-06-15 NOTE — PROGRESS NOTES
Started on CRRT (25ml/kg/hr), post op to keep net even balance after the heart transplant  Hemodynamics improving  Will keep input=output at start and then slowly increase goal UF as tolerated or indicated by the hemodynamics  Please call with any question  Madyson frances nephrology fellow  [phone]

## 2018-06-15 NOTE — PROGRESS NOTES
CRRT RESTART NOTE    Reason for Restart:  Fluid Loss Limits reached  Error Code:  NA    Patient s Vascular Access: R. Tunneled               Placement Confirmed:  YES  Manufacture:  ID Watchdog  Model:  Palindrome Precision  Length/Filipino Size:  14Fr x 28cm  Flush Volume:  2.1ml Red/Blue    DATA:  Procedure:  CVVHDF  Start Time:  1741  Machine#:  5  Filter:  M150  Blood Flow:   180 mL/min  Pre-Replacement Solution:  Phox 4/2.5  Post-Replacement Solution:  Phox 4/2.5  Dialysate Solution:  Phox 4/2.5  Pre-Replacement Solution Rate:  1000mL/hr  Post-Replacement Solution Rate:  200mL/hr  Dialysate Flow Rate:  1000mL/hr  Patient Removal Rate:  50 mL/hr  Anticoagulation Type and Rate:  NA    ASSESSMENT:  How Patient Tolerated Restart:  Well  Vital Signs:  HR 92, /60 (79), Sats 100% on CMV 40%  Initial Pressures:  Access:  -58  Filter:  106  Return:  70  TMP:  54  Change in Filter Pressure:  9    INTERVENTIONS:  Restarted CVVHDF. Checked in with bedside RN    PLAN:  Continue with treatment goals. Call CRRT RN at [phone] with questions or concerns.

## 2018-06-15 NOTE — PROGRESS NOTES
CRRT INITIATION NOTE    Consent for CRRT Completed:  yes  Patient s Vascular Access: R IJ              Placement Confirmed: yes    DATA:  Procedure:  CVVHDF  Start Time:  0244  Machine#:  5  Filter:  M150  Blood Flow:  180 ml/min  Pre-Replacement Solution:  Phoxillum 4/2.5  Post-Replacement Solution:  Phoxillum 4/2.5  Dialysate Solution:  Phoxillum 4/2.5  Pre-Replacement Solution Rate:  1000 mL/hr  Post-Replacement Solution Rate:  200 mL/hr  Dialysate Flow Rate:  1000 mL/hr   Patient Removal Rate:  30 mL/hr  Anticoagulation Type and Rate:  none    ASSESSMENT:  How Patient Tolerated Initiation: ok  Vital Signs:  , /67/89, SpO2 100, RR 14 (CMV)  Initial Pressures:  Access:  -76  Filter:  120  Return:  72  TMP:  59  Change in Filter Pressure:  17      INTERVENTIONS:  Initiated therapy as ordered. Coordinated w/ bedside RN.    PLAN:  Continue to monitor. Notify MD of changes/concerns.

## 2018-06-15 NOTE — PROGRESS NOTES
Care Coordinator Progress Note    Admission Date/Time:  4/16/2018  Attending MD:  Rony Caputo,*    Data  Chart reviewed, discussed with interdisciplinary team.   Patient was admitted for:    Chronic systolic heart failure (H)  End stage renal disease (H)  Heart transplant recipient (H), 6/14/18    Assessment  Pt had heart transplant yesterday, 6/14.  Pt is vented and sedated.  RNCC will cont to follow plan of care.     Plan  Anticipated Discharge Date:  TBD.    Anticipated Discharge Plan:   TBD.  RNCC will cont to follow plan of care.      Gianna Dougherty RN, PHN, BSN  4A and 4E/ ICU  Care Coordinator  Phone: [phone]  Pager: [phone]

## 2018-06-16 ENCOUNTER — APPOINTMENT (OUTPATIENT)
Dept: OCCUPATIONAL THERAPY | Facility: CLINIC | Age: 63
DRG: 001 | End: 2018-06-16
Attending: INTERNAL MEDICINE
Payer: COMMERCIAL

## 2018-06-16 ENCOUNTER — APPOINTMENT (OUTPATIENT)
Dept: GENERAL RADIOLOGY | Facility: CLINIC | Age: 63
DRG: 001 | End: 2018-06-16
Attending: STUDENT IN AN ORGANIZED HEALTH CARE EDUCATION/TRAINING PROGRAM
Payer: COMMERCIAL

## 2018-06-16 LAB
ABO + RH BLD: NORMAL
ABO + RH BLD: NORMAL
ALBUMIN SERPL-MCNC: 3 G/DL (ref 3.4–5)
ALP SERPL-CCNC: 85 U/L (ref 40–150)
ALT SERPL W P-5'-P-CCNC: 30 U/L (ref 0–70)
ANION GAP SERPL CALCULATED.3IONS-SCNC: 10 MMOL/L (ref 3–14)
ANION GAP SERPL CALCULATED.3IONS-SCNC: 13 MMOL/L (ref 3–14)
APTT PPP: 31 SEC (ref 22–37)
AST SERPL W P-5'-P-CCNC: 111 U/L (ref 0–45)
BASE DEFICIT BLDV-SCNC: 3.2 MMOL/L
BASE EXCESS BLDA CALC-SCNC: 0.5 MMOL/L
BASE EXCESS BLDA CALC-SCNC: 0.5 MMOL/L
BASE EXCESS BLDA CALC-SCNC: 1 MMOL/L
BASE EXCESS BLDV CALC-SCNC: 1.4 MMOL/L
BASOPHILS # BLD AUTO: 0 10E9/L (ref 0–0.2)
BASOPHILS NFR BLD AUTO: 0 %
BILIRUB SERPL-MCNC: 2 MG/DL (ref 0.2–1.3)
BLD GP AB SCN SERPL QL: NORMAL
BLD PROD TYP BPU: NORMAL
BLOOD BANK CMNT PATIENT-IMP: NORMAL
BUN SERPL-MCNC: 19 MG/DL (ref 7–30)
BUN SERPL-MCNC: 22 MG/DL (ref 7–30)
CA-I BLD-MCNC: 4.8 MG/DL (ref 4.4–5.2)
CA-I BLD-MCNC: 4.9 MG/DL (ref 4.4–5.2)
CA-I BLD-MCNC: 5.1 MG/DL (ref 4.4–5.2)
CALCIUM SERPL-MCNC: 8.9 MG/DL (ref 8.5–10.1)
CALCIUM SERPL-MCNC: 9.3 MG/DL (ref 8.5–10.1)
CHLORIDE SERPL-SCNC: 100 MMOL/L (ref 94–109)
CHLORIDE SERPL-SCNC: 98 MMOL/L (ref 94–109)
CO2 SERPL-SCNC: 23 MMOL/L (ref 20–32)
CO2 SERPL-SCNC: 24 MMOL/L (ref 20–32)
CREAT SERPL-MCNC: 2.64 MG/DL (ref 0.66–1.25)
CREAT SERPL-MCNC: 2.82 MG/DL (ref 0.66–1.25)
DIFFERENTIAL METHOD BLD: ABNORMAL
EOSINOPHIL # BLD AUTO: 0 10E9/L (ref 0–0.7)
EOSINOPHIL NFR BLD AUTO: 0 %
ERYTHROCYTE [DISTWIDTH] IN BLOOD BY AUTOMATED COUNT: 17.5 % (ref 10–15)
ERYTHROCYTE [DISTWIDTH] IN BLOOD BY AUTOMATED COUNT: 17.8 % (ref 10–15)
ERYTHROCYTE [DISTWIDTH] IN BLOOD BY AUTOMATED COUNT: 17.9 % (ref 10–15)
GFR SERPL CREATININE-BSD FRML MDRD: 23 ML/MIN/1.7M2
GFR SERPL CREATININE-BSD FRML MDRD: 25 ML/MIN/1.7M2
GLUCOSE BLDC GLUCOMTR-MCNC: 103 MG/DL (ref 70–99)
GLUCOSE BLDC GLUCOMTR-MCNC: 117 MG/DL (ref 70–99)
GLUCOSE BLDC GLUCOMTR-MCNC: 118 MG/DL (ref 70–99)
GLUCOSE BLDC GLUCOMTR-MCNC: 122 MG/DL (ref 70–99)
GLUCOSE BLDC GLUCOMTR-MCNC: 125 MG/DL (ref 70–99)
GLUCOSE BLDC GLUCOMTR-MCNC: 149 MG/DL (ref 70–99)
GLUCOSE BLDC GLUCOMTR-MCNC: 153 MG/DL (ref 70–99)
GLUCOSE BLDC GLUCOMTR-MCNC: 157 MG/DL (ref 70–99)
GLUCOSE BLDC GLUCOMTR-MCNC: 159 MG/DL (ref 70–99)
GLUCOSE BLDC GLUCOMTR-MCNC: 161 MG/DL (ref 70–99)
GLUCOSE BLDC GLUCOMTR-MCNC: 162 MG/DL (ref 70–99)
GLUCOSE BLDC GLUCOMTR-MCNC: 166 MG/DL (ref 70–99)
GLUCOSE BLDC GLUCOMTR-MCNC: 171 MG/DL (ref 70–99)
GLUCOSE BLDC GLUCOMTR-MCNC: 196 MG/DL (ref 70–99)
GLUCOSE BLDC GLUCOMTR-MCNC: 227 MG/DL (ref 70–99)
GLUCOSE SERPL-MCNC: 154 MG/DL (ref 70–99)
GLUCOSE SERPL-MCNC: 154 MG/DL (ref 70–99)
HCO3 BLD-SCNC: 25 MMOL/L (ref 21–28)
HCO3 BLDV-SCNC: 21 MMOL/L (ref 21–28)
HCO3 BLDV-SCNC: 26 MMOL/L (ref 21–28)
HCT VFR BLD AUTO: 25.8 % (ref 40–53)
HCT VFR BLD AUTO: 26.7 % (ref 40–53)
HCT VFR BLD AUTO: 29.1 % (ref 40–53)
HGB BLD-MCNC: 8.3 G/DL (ref 13.3–17.7)
HGB BLD-MCNC: 8.7 G/DL (ref 13.3–17.7)
HGB BLD-MCNC: 9.4 G/DL (ref 13.3–17.7)
IMM GRANULOCYTES # BLD: 0 10E9/L (ref 0–0.4)
IMM GRANULOCYTES NFR BLD: 0.2 %
INR PPP: 1.16 (ref 0.86–1.14)
KCT BLD-ACNC: 131 SEC (ref 75–150)
KCT BLD-ACNC: 131 SEC (ref 75–150)
KCT BLD-ACNC: 140 SEC (ref 75–150)
KCT BLD-ACNC: 144 SEC (ref 75–150)
LACTATE BLD-SCNC: 1.3 MMOL/L (ref 0.7–2)
LYMPHOCYTES # BLD AUTO: 0.5 10E9/L (ref 0.8–5.3)
LYMPHOCYTES NFR BLD AUTO: 4 %
MAGNESIUM SERPL-MCNC: 2.6 MG/DL (ref 1.6–2.3)
MAGNESIUM SERPL-MCNC: 2.7 MG/DL (ref 1.6–2.3)
MCH RBC QN AUTO: 28.8 PG (ref 26.5–33)
MCH RBC QN AUTO: 29.2 PG (ref 26.5–33)
MCH RBC QN AUTO: 29.3 PG (ref 26.5–33)
MCHC RBC AUTO-ENTMCNC: 32.2 G/DL (ref 31.5–36.5)
MCHC RBC AUTO-ENTMCNC: 32.3 G/DL (ref 31.5–36.5)
MCHC RBC AUTO-ENTMCNC: 32.6 G/DL (ref 31.5–36.5)
MCV RBC AUTO: 90 FL (ref 78–100)
MONOCYTES # BLD AUTO: 0.2 10E9/L (ref 0–1.3)
MONOCYTES NFR BLD AUTO: 1.6 %
NEUTROPHILS # BLD AUTO: 11.6 10E9/L (ref 1.6–8.3)
NEUTROPHILS NFR BLD AUTO: 94.2 %
NRBC # BLD AUTO: 0 10*3/UL
NRBC BLD AUTO-RTO: 0 /100
NUM BPU REQUESTED: 2
O2/TOTAL GAS SETTING VFR VENT: 40 %
O2/TOTAL GAS SETTING VFR VENT: ABNORMAL %
O2/TOTAL GAS SETTING VFR VENT: ABNORMAL %
OXYHGB MFR BLD: 96 % (ref 92–100)
OXYHGB MFR BLD: 97 % (ref 92–100)
OXYHGB MFR BLD: 97 % (ref 92–100)
OXYHGB MFR BLDV: 74 %
OXYHGB MFR BLDV: 81 %
PCO2 BLD: 35 MM HG (ref 35–45)
PCO2 BLD: 38 MM HG (ref 35–45)
PCO2 BLD: 38 MM HG (ref 35–45)
PCO2 BLDV: 34 MM HG (ref 40–50)
PCO2 BLDV: 39 MM HG (ref 40–50)
PH BLD: 7.43 PH (ref 7.35–7.45)
PH BLD: 7.43 PH (ref 7.35–7.45)
PH BLD: 7.46 PH (ref 7.35–7.45)
PH BLDV: 7.4 PH (ref 7.32–7.43)
PH BLDV: 7.43 PH (ref 7.32–7.43)
PHOSPHATE SERPL-MCNC: 5.6 MG/DL (ref 2.5–4.5)
PHOSPHATE SERPL-MCNC: 6.7 MG/DL (ref 2.5–4.5)
PLATELET # BLD AUTO: 125 10E9/L (ref 150–450)
PLATELET # BLD AUTO: 130 10E9/L (ref 150–450)
PLATELET # BLD AUTO: 145 10E9/L (ref 150–450)
PO2 BLD: 120 MM HG (ref 80–105)
PO2 BLD: 171 MM HG (ref 80–105)
PO2 BLD: 180 MM HG (ref 80–105)
PO2 BLDV: 42 MM HG (ref 25–47)
PO2 BLDV: 49 MM HG (ref 25–47)
POTASSIUM SERPL-SCNC: 4.9 MMOL/L (ref 3.4–5.3)
POTASSIUM SERPL-SCNC: 5 MMOL/L (ref 3.4–5.3)
PROT SERPL-MCNC: 6.2 G/DL (ref 6.8–8.8)
RBC # BLD AUTO: 2.88 10E12/L (ref 4.4–5.9)
RBC # BLD AUTO: 2.97 10E12/L (ref 4.4–5.9)
RBC # BLD AUTO: 3.22 10E12/L (ref 4.4–5.9)
SODIUM SERPL-SCNC: 134 MMOL/L (ref 133–144)
SODIUM SERPL-SCNC: 135 MMOL/L (ref 133–144)
SPECIMEN EXP DATE BLD: NORMAL
WBC # BLD AUTO: 12.3 10E9/L (ref 4–11)
WBC # BLD AUTO: 13.3 10E9/L (ref 4–11)
WBC # BLD AUTO: 15.8 10E9/L (ref 4–11)

## 2018-06-16 PROCEDURE — 63400005 ZZH RX 634: Performed by: INTERNAL MEDICINE

## 2018-06-16 PROCEDURE — 25000128 H RX IP 250 OP 636: Performed by: STUDENT IN AN ORGANIZED HEALTH CARE EDUCATION/TRAINING PROGRAM

## 2018-06-16 PROCEDURE — 25000125 ZZHC RX 250: Performed by: THORACIC SURGERY (CARDIOTHORACIC VASCULAR SURGERY)

## 2018-06-16 PROCEDURE — 99024 POSTOP FOLLOW-UP VISIT: CPT | Performed by: INTERNAL MEDICINE

## 2018-06-16 PROCEDURE — 25000131 ZZH RX MED GY IP 250 OP 636 PS 637: Performed by: STUDENT IN AN ORGANIZED HEALTH CARE EDUCATION/TRAINING PROGRAM

## 2018-06-16 PROCEDURE — 00000146 ZZHCL STATISTIC GLUCOSE BY METER IP

## 2018-06-16 PROCEDURE — 82805 BLOOD GASES W/O2 SATURATION: CPT | Performed by: THORACIC SURGERY (CARDIOTHORACIC VASCULAR SURGERY)

## 2018-06-16 PROCEDURE — 86850 RBC ANTIBODY SCREEN: CPT | Performed by: STUDENT IN AN ORGANIZED HEALTH CARE EDUCATION/TRAINING PROGRAM

## 2018-06-16 PROCEDURE — 25000128 H RX IP 250 OP 636: Performed by: THORACIC SURGERY (CARDIOTHORACIC VASCULAR SURGERY)

## 2018-06-16 PROCEDURE — 86900 BLOOD TYPING SEROLOGIC ABO: CPT | Performed by: STUDENT IN AN ORGANIZED HEALTH CARE EDUCATION/TRAINING PROGRAM

## 2018-06-16 PROCEDURE — 90947 DIALYSIS REPEATED EVAL: CPT

## 2018-06-16 PROCEDURE — 25000131 ZZH RX MED GY IP 250 OP 636 PS 637: Performed by: THORACIC SURGERY (CARDIOTHORACIC VASCULAR SURGERY)

## 2018-06-16 PROCEDURE — 85347 COAGULATION TIME ACTIVATED: CPT

## 2018-06-16 PROCEDURE — 25000132 ZZH RX MED GY IP 250 OP 250 PS 637: Mod: GY | Performed by: THORACIC SURGERY (CARDIOTHORACIC VASCULAR SURGERY)

## 2018-06-16 PROCEDURE — 20000004 ZZH R&B ICU UMMC

## 2018-06-16 PROCEDURE — 25000132 ZZH RX MED GY IP 250 OP 250 PS 637: Mod: GY | Performed by: STUDENT IN AN ORGANIZED HEALTH CARE EDUCATION/TRAINING PROGRAM

## 2018-06-16 PROCEDURE — 40000275 ZZH STATISTIC RCP TIME EA 10 MIN

## 2018-06-16 PROCEDURE — 25000128 H RX IP 250 OP 636: Performed by: INTERNAL MEDICINE

## 2018-06-16 PROCEDURE — 82330 ASSAY OF CALCIUM: CPT | Performed by: INTERNAL MEDICINE

## 2018-06-16 PROCEDURE — 82330 ASSAY OF CALCIUM: CPT | Performed by: THORACIC SURGERY (CARDIOTHORACIC VASCULAR SURGERY)

## 2018-06-16 PROCEDURE — 40000133 ZZH STATISTIC OT WARD VISIT

## 2018-06-16 PROCEDURE — 90937 HEMODIALYSIS REPEATED EVAL: CPT

## 2018-06-16 PROCEDURE — 40000196 ZZH STATISTIC RAPCV CVP MONITORING

## 2018-06-16 PROCEDURE — 71045 X-RAY EXAM CHEST 1 VIEW: CPT

## 2018-06-16 PROCEDURE — 25000125 ZZHC RX 250: Performed by: STUDENT IN AN ORGANIZED HEALTH CARE EDUCATION/TRAINING PROGRAM

## 2018-06-16 PROCEDURE — 86901 BLOOD TYPING SEROLOGIC RH(D): CPT | Performed by: STUDENT IN AN ORGANIZED HEALTH CARE EDUCATION/TRAINING PROGRAM

## 2018-06-16 PROCEDURE — 25000125 ZZHC RX 250: Performed by: INTERNAL MEDICINE

## 2018-06-16 PROCEDURE — 27210432 ZZH NUTRITION PRODUCT RENAL BASIC LITER

## 2018-06-16 PROCEDURE — 97530 THERAPEUTIC ACTIVITIES: CPT | Mod: GO

## 2018-06-16 PROCEDURE — 40000048 ZZH STATISTIC DAILY SWAN MONITORING

## 2018-06-16 PROCEDURE — A9270 NON-COVERED ITEM OR SERVICE: HCPCS | Mod: GY | Performed by: THORACIC SURGERY (CARDIOTHORACIC VASCULAR SURGERY)

## 2018-06-16 PROCEDURE — 40000014 ZZH STATISTIC ARTERIAL MONITORING DAILY

## 2018-06-16 PROCEDURE — 80048 BASIC METABOLIC PNL TOTAL CA: CPT | Performed by: THORACIC SURGERY (CARDIOTHORACIC VASCULAR SURGERY)

## 2018-06-16 PROCEDURE — A9270 NON-COVERED ITEM OR SERVICE: HCPCS | Mod: GY | Performed by: STUDENT IN AN ORGANIZED HEALTH CARE EDUCATION/TRAINING PROGRAM

## 2018-06-16 PROCEDURE — 97168 OT RE-EVAL EST PLAN CARE: CPT | Mod: GO

## 2018-06-16 PROCEDURE — 85027 COMPLETE CBC AUTOMATED: CPT | Performed by: THORACIC SURGERY (CARDIOTHORACIC VASCULAR SURGERY)

## 2018-06-16 PROCEDURE — 99233 SBSQ HOSP IP/OBS HIGH 50: CPT | Mod: GC | Performed by: SURGERY

## 2018-06-16 PROCEDURE — 84100 ASSAY OF PHOSPHORUS: CPT | Performed by: THORACIC SURGERY (CARDIOTHORACIC VASCULAR SURGERY)

## 2018-06-16 PROCEDURE — 82805 BLOOD GASES W/O2 SATURATION: CPT | Performed by: INTERNAL MEDICINE

## 2018-06-16 PROCEDURE — 83735 ASSAY OF MAGNESIUM: CPT | Performed by: THORACIC SURGERY (CARDIOTHORACIC VASCULAR SURGERY)

## 2018-06-16 PROCEDURE — 25000132 ZZH RX MED GY IP 250 OP 250 PS 637: Mod: GY | Performed by: PHYSICIAN ASSISTANT

## 2018-06-16 PROCEDURE — 25000132 ZZH RX MED GY IP 250 OP 250 PS 637: Performed by: STUDENT IN AN ORGANIZED HEALTH CARE EDUCATION/TRAINING PROGRAM

## 2018-06-16 PROCEDURE — 94003 VENT MGMT INPAT SUBQ DAY: CPT

## 2018-06-16 PROCEDURE — A9270 NON-COVERED ITEM OR SERVICE: HCPCS | Mod: GY | Performed by: PHYSICIAN ASSISTANT

## 2018-06-16 PROCEDURE — 94799 UNLISTED PULMONARY SVC/PX: CPT

## 2018-06-16 PROCEDURE — 83605 ASSAY OF LACTIC ACID: CPT | Performed by: INTERNAL MEDICINE

## 2018-06-16 RX ORDER — PRAVASTATIN SODIUM 40 MG
40 TABLET ORAL DAILY
Status: DISCONTINUED | OUTPATIENT
Start: 2018-06-16 | End: 2018-07-02 | Stop reason: HOSPADM

## 2018-06-16 RX ORDER — HYDRALAZINE HYDROCHLORIDE 20 MG/ML
10 INJECTION INTRAMUSCULAR; INTRAVENOUS EVERY 4 HOURS PRN
Status: DISCONTINUED | OUTPATIENT
Start: 2018-06-16 | End: 2018-06-23

## 2018-06-16 RX ORDER — HYDROMORPHONE HYDROCHLORIDE 1 MG/ML
.3-.5 INJECTION, SOLUTION INTRAMUSCULAR; INTRAVENOUS; SUBCUTANEOUS EVERY 4 HOURS PRN
Status: DISCONTINUED | OUTPATIENT
Start: 2018-06-16 | End: 2018-07-02 | Stop reason: HOSPADM

## 2018-06-16 RX ORDER — PANTOPRAZOLE SODIUM 40 MG/1
40 TABLET, DELAYED RELEASE ORAL EVERY MORNING
Status: DISCONTINUED | OUTPATIENT
Start: 2018-06-17 | End: 2018-07-02 | Stop reason: HOSPADM

## 2018-06-16 RX ORDER — TERBUTALINE SULFATE 5 MG/1
5 TABLET ORAL EVERY 8 HOURS
Status: DISCONTINUED | OUTPATIENT
Start: 2018-06-16 | End: 2018-07-02 | Stop reason: HOSPADM

## 2018-06-16 RX ORDER — TRAMADOL HYDROCHLORIDE 50 MG/1
50 TABLET ORAL EVERY 6 HOURS PRN
Status: DISCONTINUED | OUTPATIENT
Start: 2018-06-16 | End: 2018-07-02 | Stop reason: HOSPADM

## 2018-06-16 RX ORDER — NICOTINE POLACRILEX 4 MG
15-30 LOZENGE BUCCAL
Status: DISCONTINUED | OUTPATIENT
Start: 2018-06-16 | End: 2018-07-02 | Stop reason: HOSPADM

## 2018-06-16 RX ORDER — SULFAMETHOXAZOLE/TRIMETHOPRIM 400MG-80MG
1 TABLET ORAL EVERY EVENING
Status: DISCONTINUED | OUTPATIENT
Start: 2018-06-16 | End: 2018-07-02

## 2018-06-16 RX ORDER — GLIPIZIDE 5 MG/1
5 TABLET, FILM COATED, EXTENDED RELEASE ORAL
Status: DISCONTINUED | OUTPATIENT
Start: 2018-06-17 | End: 2018-06-18

## 2018-06-16 RX ORDER — SULFAMETHOXAZOLE/TRIMETHOPRIM 800-160 MG
1 TABLET ORAL DAILY
Status: DISCONTINUED | OUTPATIENT
Start: 2018-06-16 | End: 2018-06-16

## 2018-06-16 RX ORDER — HEPARIN SODIUM 1000 [USP'U]/ML
500 INJECTION, SOLUTION INTRAVENOUS; SUBCUTANEOUS
Status: COMPLETED | OUTPATIENT
Start: 2018-06-16 | End: 2018-06-16

## 2018-06-16 RX ORDER — DEXTROSE MONOHYDRATE 25 G/50ML
25-50 INJECTION, SOLUTION INTRAVENOUS
Status: DISCONTINUED | OUTPATIENT
Start: 2018-06-16 | End: 2018-07-02 | Stop reason: HOSPADM

## 2018-06-16 RX ORDER — MYCOPHENOLATE MOFETIL 250 MG/1
1500 CAPSULE ORAL
Status: DISCONTINUED | OUTPATIENT
Start: 2018-06-16 | End: 2018-07-02 | Stop reason: HOSPADM

## 2018-06-16 RX ORDER — HEPARIN SODIUM 1000 [USP'U]/ML
500 INJECTION, SOLUTION INTRAVENOUS; SUBCUTANEOUS CONTINUOUS
Status: DISCONTINUED | OUTPATIENT
Start: 2018-06-16 | End: 2018-06-16

## 2018-06-16 RX ORDER — TERBUTALINE SULFATE 5 MG/1
5 TABLET ORAL EVERY 6 HOURS SCHEDULED
Status: DISCONTINUED | OUTPATIENT
Start: 2018-06-16 | End: 2018-06-16

## 2018-06-16 RX ORDER — HYDRALAZINE HYDROCHLORIDE 20 MG/ML
20 INJECTION INTRAMUSCULAR; INTRAVENOUS EVERY 4 HOURS PRN
Status: DISCONTINUED | OUTPATIENT
Start: 2018-06-16 | End: 2018-06-16

## 2018-06-16 RX ORDER — NYSTATIN 100000/ML
1000000 SUSPENSION, ORAL (FINAL DOSE FORM) ORAL 4 TIMES DAILY
Status: DISCONTINUED | OUTPATIENT
Start: 2018-06-16 | End: 2018-07-02 | Stop reason: HOSPADM

## 2018-06-16 RX ORDER — HEPARIN SODIUM 5000 [USP'U]/.5ML
5000 INJECTION, SOLUTION INTRAVENOUS; SUBCUTANEOUS EVERY 8 HOURS
Status: DISCONTINUED | OUTPATIENT
Start: 2018-06-16 | End: 2018-06-22

## 2018-06-16 RX ADMIN — TERBUTALINE SULFATE 5 MG: 5 TABLET ORAL at 21:48

## 2018-06-16 RX ADMIN — Medication: at 14:24

## 2018-06-16 RX ADMIN — NYSTATIN 1000000 UNITS: 100000 SUSPENSION ORAL at 13:04

## 2018-06-16 RX ADMIN — TRAMADOL HYDROCHLORIDE 50 MG: 50 TABLET, COATED ORAL at 22:28

## 2018-06-16 RX ADMIN — PREDNISONE 40 MG: 20 TABLET ORAL at 19:59

## 2018-06-16 RX ADMIN — ISOPROTERENOL HYDROCHLORIDE 0.02 MCG/KG/MIN: 0.2 INJECTION, SOLUTION INTRAMUSCULAR; INTRAVENOUS at 19:43

## 2018-06-16 RX ADMIN — HUMAN INSULIN 4 UNITS/HR: 100 INJECTION, SOLUTION SUBCUTANEOUS at 05:05

## 2018-06-16 RX ADMIN — MYCOPHENOLATE MOFETIL 1500 MG: 250 CAPSULE ORAL at 16:20

## 2018-06-16 RX ADMIN — TERBUTALINE SULFATE 5 MG: 5 TABLET ORAL at 13:03

## 2018-06-16 RX ADMIN — ACETAMINOPHEN 975 MG: 325 TABLET, FILM COATED ORAL at 00:44

## 2018-06-16 RX ADMIN — PRAVASTATIN SODIUM 40 MG: 40 TABLET ORAL at 16:20

## 2018-06-16 RX ADMIN — Medication 100 MCG/HR: at 05:58

## 2018-06-16 RX ADMIN — SODIUM CHLORIDE 250 ML: 9 INJECTION, SOLUTION INTRAVENOUS at 14:24

## 2018-06-16 RX ADMIN — EPOETIN ALFA 4000 UNITS: 10000 SOLUTION INTRAVENOUS; SUBCUTANEOUS at 15:33

## 2018-06-16 RX ADMIN — NYSTATIN 1000000 UNITS: 100000 SUSPENSION ORAL at 19:59

## 2018-06-16 RX ADMIN — Medication 0.5 MG: at 07:30

## 2018-06-16 RX ADMIN — Medication 5 ML: at 07:31

## 2018-06-16 RX ADMIN — ISOPROTERENOL HYDROCHLORIDE 0.06 MCG/KG/MIN: 0.2 INJECTION, SOLUTION INTRAMUSCULAR; INTRAVENOUS at 10:18

## 2018-06-16 RX ADMIN — HUMAN INSULIN 2 UNITS/HR: 100 INJECTION, SOLUTION SUBCUTANEOUS at 14:47

## 2018-06-16 RX ADMIN — HEPARIN SODIUM 500 UNITS/HR: 1000 INJECTION, SOLUTION INTRAVENOUS; SUBCUTANEOUS at 15:15

## 2018-06-16 RX ADMIN — ISOPROTERENOL HYDROCHLORIDE 0.04 MCG/KG/MIN: 0.2 INJECTION, SOLUTION INTRAMUSCULAR; INTRAVENOUS at 13:30

## 2018-06-16 RX ADMIN — NYSTATIN 1000000 UNITS: 100000 SUSPENSION ORAL at 07:45

## 2018-06-16 RX ADMIN — ACETAMINOPHEN 975 MG: 325 TABLET, FILM COATED ORAL at 10:11

## 2018-06-16 RX ADMIN — SENNOSIDES AND DOCUSATE SODIUM 1 TABLET: 8.6; 5 TABLET ORAL at 19:58

## 2018-06-16 RX ADMIN — HEPARIN SODIUM 5000 UNITS: 5000 INJECTION, SOLUTION INTRAVENOUS; SUBCUTANEOUS at 13:04

## 2018-06-16 RX ADMIN — ISOPROTERENOL HYDROCHLORIDE 0.06 MCG/KG/MIN: 0.2 INJECTION, SOLUTION INTRAMUSCULAR; INTRAVENOUS at 05:44

## 2018-06-16 RX ADMIN — Medication 0.5 MG: at 18:43

## 2018-06-16 RX ADMIN — Medication 12.5 ML/KG/HR: at 10:00

## 2018-06-16 RX ADMIN — PANTOPRAZOLE SODIUM 40 MG: 40 INJECTION, POWDER, FOR SOLUTION INTRAVENOUS at 07:45

## 2018-06-16 RX ADMIN — SENNOSIDES AND DOCUSATE SODIUM 1 TABLET: 8.6; 5 TABLET ORAL at 07:31

## 2018-06-16 RX ADMIN — HEPARIN SODIUM 500 UNITS: 1000 INJECTION, SOLUTION INTRAVENOUS; SUBCUTANEOUS at 15:14

## 2018-06-16 RX ADMIN — VANCOMYCIN HYDROCHLORIDE 1500 MG: 10 INJECTION, POWDER, LYOPHILIZED, FOR SOLUTION INTRAVENOUS at 18:37

## 2018-06-16 RX ADMIN — PREDNISONE 40 MG: 20 TABLET ORAL at 07:31

## 2018-06-16 RX ADMIN — Medication 12.5 ML/KG/HR: at 03:44

## 2018-06-16 RX ADMIN — MYCOPHENOLATE MOFETIL 1500 MG: 500 INJECTION, POWDER, LYOPHILIZED, FOR SOLUTION INTRAVENOUS at 02:18

## 2018-06-16 RX ADMIN — PROPOFOL 30 MCG/KG/MIN: 10 INJECTION, EMULSION INTRAVENOUS at 03:44

## 2018-06-16 RX ADMIN — ACETAMINOPHEN 975 MG: 325 TABLET, FILM COATED ORAL at 16:36

## 2018-06-16 RX ADMIN — SODIUM CHLORIDE 300 ML: 9 INJECTION, SOLUTION INTRAVENOUS at 14:23

## 2018-06-16 RX ADMIN — Medication 1 HALF-TAB: at 20:00

## 2018-06-16 RX ADMIN — TRAMADOL HYDROCHLORIDE 50 MG: 50 TABLET, COATED ORAL at 16:14

## 2018-06-16 RX ADMIN — NYSTATIN 1000000 UNITS: 100000 SUSPENSION ORAL at 16:23

## 2018-06-16 RX ADMIN — ISOPROTERENOL HYDROCHLORIDE 0.06 MCG/KG/MIN: 0.2 INJECTION, SOLUTION INTRAMUSCULAR; INTRAVENOUS at 01:52

## 2018-06-16 RX ADMIN — HYDRALAZINE HYDROCHLORIDE 10 MG: 20 INJECTION INTRAMUSCULAR; INTRAVENOUS at 22:17

## 2018-06-16 RX ADMIN — HEPARIN SODIUM 5000 UNITS: 5000 INJECTION, SOLUTION INTRAVENOUS; SUBCUTANEOUS at 19:58

## 2018-06-16 ASSESSMENT — PAIN DESCRIPTION - DESCRIPTORS: DESCRIPTORS: ACHING

## 2018-06-16 NOTE — PROGRESS NOTES
[facility], [place]  Procedure Note          Extubation:       Murray Nicholson  MRN# [id number]   June 16, 2018, 11:16 AM         Patient extubated at: June 16, 2018, 07:40 AM   Supplemental Oxygen: Via nasal cannula at 3 liters per minute   Cough: The cough is good and productive   Secretion Mode: Able to clear  PRN suction by self   Secretion Amount: Small amount, moderately thick and clear in color   Respiratory Exam:: Breath sounds: rhonchi RUL/, otherwise clear/diminished     Location: all lobes and bilaterally   Skin Exam:: Patient color: natural   Patient Status: Currently appears comfortable   Arterial Blood Gasses: pH Arterial (pH)   Date Value   06/16/2018 7.43     pO2 Arterial (mm Hg)   Date Value   06/16/2018 120 (H)     pCO2 Arterial (mm Hg)   Date Value   06/16/2018 38     Bicarbonate Arterial (mmol/L)   Date Value   06/16/2018 25                Pressure Support Trial Summary    Settings:  Ventilation Mode: CPAP/PS  (Continuous positive airway pressure with Pressure Support)  PEEP (cm H2O): 5 cmH2O  Pressure Support (cm H2O): 7 cmH2O  Oxygen Concentration (%): 40 %    Patient Parameters:  Heart Rate: 95  BP: 142/70  BP - Mean: 97  Spontaneous Respiratory Rate: 24 breaths/min  Spontaneous Tidal Volume: 0.39 mL  No Data Recorded  RSBI (calculation): 61.54  RSBI trend: steady  No Data Recorded  No Data Recorded  See RT Accordian for complete documentation.    Based on pressure support trial results and RT assessment, recommend extubation following physician assessment.    Elina Llanes, RRT  6/16/2018 07:40am

## 2018-06-16 NOTE — PROGRESS NOTES
CVTS PROGRESS NOTE  June 16, 2018        CO-MORBIDITIES:   Chronic systolic heart failure (H)  (primary encounter diagnosis)  End stage renal disease (H)  Heart transplant recipient (H)     ASSESSMENT: Murray Nicholson is a 63 year old male CAD, ICM (EF of 15-20%), ESRD on HD, bicuspid aortic valve with AI, severe MR, and proximal AAA who was admitted for decompensated heart failure now s/p heart transplant on 6/14/18. Extubated uneventfully, weaned off of the nitric oxide easily.      TODAY'S PROGRESS:   -advance diet as tolerated  -dialysis with nephrology today  - hydralazine prn if htn, hold until after dialysis  - tramadol 50 q6h prn pain  - heparin 5000 u sq tid  - insulin sliding scale  - remove a line, swan, and cvc.   - keeping chest tube today      PLAN:  Neuro/ pain/ sedation:  - Monitor neurological status. Notify the MD for any acute changes in exam.  - tramadol for pain, hydromorphone iv prn also   - acetaminophen scheduled     Pulmonary care:   - supplemental O2 as needed  - pulmonary toilet encouraged today.       Cardiovascular:    - Monitor hemodynamic status.   - prn hydralazine if needed for BP control.   - pravastatin instead of atorvastatin for immunosuppression coverage.   - wean isoproterenol as tolerated to keep HR > 90  - immunosuppression per cardiology      GI care:   - Feeding tube today then advance diet      Fluids/ Electrolytes/ Nutrition:   - with IHD    Renal/ Fluid Balance:    - CRRT switched to IHD.       Endocrine:    - sliding scale insulin  - home glipizide tomorrow      ID/ Antibiotics:  - post transplant antibotics.       Heme:     - Hemoglobin stable.       Prophylaxis:    - Mechanical prophylaxis for DVT.  - heparin TID 5000 U SQ  - Protonix with steroids.       Lines/ tubes/ drains:  - dilaysis catheter, picc  - remove a line, swan, and cvc if off of drips.       Disposition:  - CV ICU      Patient seen, findings and plan discussed with CVTS Fellow Dr. Robson Martin  Liza BLANK PGY-3  [phone]     ====================================     TODAY'S PROGRESS:   SUBJECTIVE:   - no issues post extubation, tolerated some sips of liquids, getting out of bed. Pain control adequate.          OBJECTIVE:   1. VITAL SIGNS:   Temp:  [96.4  F (35.8  C)-98.2  F (36.8  C)] 98.2  F (36.8  C)  Heart Rate:  [89-98] 93  Resp:  [16-36] 20  BP: (139-150)/(70-91) 150/91  MAP:  [61 mmHg-114 mmHg] 107 mmHg  Arterial Line BP: ()/(46-90) 147/81  FiO2 (%):  [40 %] 40 %  SpO2:  [97 %-100 %] 99 %  Ventilation Mode: CPAP/PS  (Continuous positive airway pressure with Pressure Support)  FiO2 (%): 40 %  Rate Set (breaths/minute): 16 breaths/min  Tidal Volume Set (mL): 500 mL  PEEP (cm H2O): 5 cmH2O  Pressure Support (cm H2O): 7 cmH2O  Oxygen Concentration (%): 40 %  Resp: 20   2. INTAKE/ OUTPUT:   I/O last 3 completed shifts:  In: 3288.38 [I.V.:2091.38; Other:87; NG/GT:330]  Out: 2241 [Emesis/NG output:425; Other:1466; Chest Tube:350]     3. PHYSICAL EXAMINATION:   General:   Neuro: A&Ox3, NAD  Resp: Breathing non-labored  CV: RRR, sinus tach in 100s on 0.06 isoproterenol  Abdomen: Soft, Non-distended, Non-tender  Incisions: c/d/i  Extremities: warm and well perfused   4. INVESTIGATIONS:   Arterial Blood Gases      Recent Labs  Lab 06/16/18  0828 06/16/18  0655 06/16/18  0334 06/15/18  2200   PH 7.43 7.43 7.46* 7.47*   PCO2 38 38 35 34*   PO2 120* 171* 180* 169*   HCO3 25 25 25 25      Complete Blood Count      Recent Labs  Lab 06/16/18  1043 06/16/18  0342 06/15/18  2200 06/15/18  1541   WBC 13.3* 12.3* 11.2* 10.8   HGB 8.7* 8.3* 8.2* 8.7*   * 145* 145* 135*      Basic Metabolic Panel     Recent Labs  Lab 06/16/18  1043 06/16/18  0342 06/15/18  2200 06/15/18  1541    134 136 136   POTASSIUM 5.0 4.9 4.6 5.0   CHLORIDE 98 100 101 100   CO2 24 23 22 23   BUN 22 19 19 20   CR 2.82* 2.64* 2.94* 3.14*   * 154* 116* 155*      Liver Function Tests     Recent Labs  Lab 06/16/18  0342  06/15/18  2200 06/15/18  1541 06/15/18  0958   06/15/18  0017 06/14/18  2220   * 107* 105* 100*  < > 82*  --    ALT 30 28 26 28  < > 25  --    ALKPHOS 85 83 83 81  < > 88  --    BILITOTAL 2.0* 2.7* 2.5* 2.8*  < > 1.8*  --    ALBUMIN 3.0* 3.0* 3.2* 3.2*  < > 2.8*  --    INR 1.16*  --  1.42*  --   --  1.36* 1.40*   < > = values in this interval not displayed.  Pancreatic Enzymes     Recent Labs  Lab 06/14/18  1207   AMYLASE 62      Coagulation Profile     Recent Labs  Lab 06/16/18  0342 06/15/18  1541 06/15/18  0017 06/14/18  2220   INR 1.16* 1.42* 1.36* 1.40*   PTT 31 33 38* 36      Lactate  Invalid input(s): LACTATE     5. RADIOLOGY:       Recent Results (from the past 24 hour(s))   XR Chest Port 1 View     Narrative     XR CHEST PORT 1 VW  6/16/2018 5:43 AM     History:  interval eval; .      Comparison: Chest radiograph dated 6/15/2018     Findings:   15 degree portable AP chest radiograph.  Endotracheal tube with the tip projects 6.9 cm there are bilateral  frank. Stable position of gastric and feeding tubes, mediastinal  drainage tube, Underwood-Jax catheter, right chest tube, right central  venous catheter, left IJ central venous catheter and left PICC line.  Cardiac silhouette is slightly enlarged. No acute airspace opacities.  No appreciable pneumothorax or pleural effusion. The visualized upper  abdomen is unremarkable.     Impression     IMPRESSION:  1.  Stable supportive lines and tubes.  2.  Mild cardiomegaly with interstitial pulmonary edema.     I have personally reviewed the examination and initial interpretation  and I agree with the findings.     CLARICE VILLARREAL MD         =========================================

## 2018-06-16 NOTE — PROGRESS NOTES
Cardiology Heart Failure Progress Note    Assessment & Plan   63 year old male with a PMH of CAD, ICM (EF of 15-20%), ESRD, bicuspid aortic valve with AI, severe MR, and proximal AAA who was admitted for decompensated heart failure. He underwent OHT on 7/15/2018.          - Hemodynamic support                        - isoproterenol-> at 0.06 transitioning to terbutaline 5mg q8h VVI epicardial pacing (lower rate limit at 80)                        - Per Surgery team, to have SBP <130 due to visualized aortic aneurysm       - Immunosuppression                        - Mycophenolate 1500 mg bid (transition to PO 6/16), methylprednisolone -> prednisone 50 mg bid from 6/16- with continued taper                        - starting Tacro 0.5 mg bid-> daily tacro levels     - Infection prophylaxis-  CMV/ recepient - / donor +,   EBV + / pending    High risk Donor - will need to check infection HIV, hepatitis panel and so forth in 1-2 months                        - Nystatin daily                        - Trimethoprim-sulfamethoxazole renally dosed                                                  - valganciclovir 450 mg while on CRRT, will change dosage when on HD                                                   - CAV prophylaxis                        - Hold aspirin and rosuvastatin until improvement in systemic condition       Roxanna Soriano   Cardiology fellow, PGY-4    Interval History     No acute events  Extubated this am   WBC 12  methylpred completed   HR in the 90's with isupril at 0.06    Physical Exam   Temp: 98.2  F (36.8  C) Temp src: Pulmonary Artery BP: (!) 150/91   Heart Rate: 93 Resp: 20 SpO2: 100 % O2 Device: None (Room air) Oxygen Delivery: 4 LPM  Vitals:    06/13/18 0800 06/14/18 0746 06/14/18 1040   Weight: 76.9 kg (169 lb 10.3 oz) 78 kg (171 lb 15.3 oz) 78 kg (171 lb 15.3 oz)     Vital Signs with Ranges  Temp:  [96.4  F (35.8  C)-98.2  F (36.8  C)] 98.2  F (36.8  C)  Heart Rate:  [89-98]  "93  Resp:  [16-36] 20  BP: (139-150)/(70-91) 150/91  MAP:  [61 mmHg-118 mmHg] 118 mmHg  Arterial Line BP: ()/(46-90) 163/87  FiO2 (%):  [40 %] 40 %  SpO2:  [97 %-100 %] 100 %  I/O last 3 completed shifts:  In: 2686.79 [P.O.:230; I.V.:1515.79; Other:71; NG/GT:330]  Out: 2238 [Emesis/NG output:425; Other:1543; Chest Tube:270]    Heart Rate: 93, Blood pressure (!) 150/91, pulse 107, temperature 98.2  F (36.8  C), temperature source Pulmonary Artery, resp. rate 20, height 1.727 m (5' 8\"), weight 78 kg (171 lb 15.3 oz), SpO2 100 %.  171 lbs 15.34 oz  GEN:  Intubated writing on a note pad  CV: tachycardic  LUNGS:  Lungs with decreased breath sounds  ABD:  Active bowel sounds, soft, non-tender/non-distended.  No rebound/guarding/rigidity.  EXT:  No edema or cyanosis.      Medications     IV fluid REPLACEMENT ONLY       IV fluid REPLACEMENT ONLY       dextrose 5% and 0.45% NaCl 10 mL/hr at 06/15/18 0900     heparin (porcine)       isoproterenol (ISUPREL) infusion ADULT 0.04 mcg/kg/min (06/16/18 1500)     Reason beta blocker order not selected         acetaminophen  975 mg Oral Q8H     epoetin emily (EPOGEN,PROCRIT) inj ESRD  4,000 Units Intravenous Once in dialysis     [START ON 6/17/2018] glipiZIDE  5 mg Oral Daily with breakfast     heparin (porcine)  500 Units Hemodialysis Machine Once in dialysis     heparin  5,000 Units Subcutaneous Q8H     insulin aspart  1-10 Units Subcutaneous TID AC     insulin aspart  1-7 Units Subcutaneous At Bedtime     mycophenolate  1,500 mg Oral BID IS     [START ON 6/17/2018] NEPHROCAPS  1 capsule Oral Daily     nystatin  1,000,000 Units Swish & Swallow 4x Daily     [START ON 6/17/2018] pantoprazole  40 mg Oral QAM     pravastatin  40 mg Oral Daily     predniSONE  40 mg Oral BID    Followed by     [START ON 6/18/2018] predniSONE  35 mg Oral BID    Followed by     [START ON 6/20/2018] predniSONE  30 mg Oral BID    Followed by     [START ON 6/22/2018] predniSONE  25 mg Oral BID    " Followed by     [START ON 6/24/2018] predniSONE  20 mg Oral BID     senna-docusate  1 tablet Oral BID    Or     senna-docusate  2 tablet Oral BID     sodium chloride (PF)  3 mL Intracatheter During Hemodialysis (from stock)     sodium chloride (PF)  3 mL Intracatheter During Hemodialysis (from stock)     sodium chloride (PF)  3 mL Intracatheter Q8H     sulfamethoxazole-trimethoprim  1 half-tab Oral QPM     tacrolimus  0.5 mg Oral BID IS     terbutaline  5 mg Oral or Feeding Tube Q8H     valGANciclovir  450 mg Oral or Feeding Tube Every Other Day     vancomycin (VANCOCIN) IV  1,500 mg Intravenous Q24H       Data     Recent Labs  Lab 06/16/18  1043 06/16/18  0342 06/15/18  2200 06/15/18  1541  06/15/18  0017   WBC 13.3* 12.3* 11.2* 10.8  < > 10.9   HGB 8.7* 8.3* 8.2* 8.7*  < > 8.6*   MCV 90 90 89 90  < > 91   * 145* 145* 135*  < > 155   INR  --  1.16*  --  1.42*  --  1.36*    134 136 136  < > 136   POTASSIUM 5.0 4.9 4.6 5.0  < > 4.0   CHLORIDE 98 100 101 100  < > 96   CO2 24 23 22 23  < > 24   BUN 22 19 19 20  < > 21   CR 2.82* 2.64* 2.94* 3.14*  < > 4.50*   ANIONGAP 13 10 13 13  < > 15*   TRINI 9.3 8.9 9.0 9.4  < > 10.0   * 154* 116* 155*  < > 166*   ALBUMIN  --  3.0* 3.0* 3.2*  < > 2.8*   PROTTOTAL  --  6.2* 6.1* 6.3*  < > 6.1*   BILITOTAL  --  2.0* 2.7* 2.5*  < > 1.8*   ALKPHOS  --  85 83 83  < > 88   ALT  --  30 28 26  < > 25   AST  --  111* 107* 105*  < > 82*   < > = values in this interval not displayed.    Recent Results (from the past 24 hour(s))   XR Chest Port 1 View    Narrative    XR CHEST PORT 1 VW  6/16/2018 5:43 AM    History:  interval eval; .     Comparison: Chest radiograph dated 6/15/2018    Findings:   15 degree portable AP chest radiograph.  Endotracheal tube with the tip projects 6.9 cm there are bilateral  frank. Stable position of gastric and feeding tubes, mediastinal  drainage tube, [place]ax catheter, right chest tube, right central  venous catheter, left IJ central venous  catheter and left PICC line.  Cardiac silhouette is slightly enlarged. No acute airspace opacities.  No appreciable pneumothorax or pleural effusion. The visualized upper  abdomen is unremarkable.      Impression    IMPRESSION:  1.  Stable supportive lines and tubes.  2.  Mild cardiomegaly with interstitial pulmonary edema.    I have personally reviewed the examination and initial interpretation  and I agree with the findings.    CLARICE VILLARREAL MD     I personally provided care for this patient, reviewed chart, discussed course with patient, housestaff and consulting physicians.  I answered all questions.    Rhett Austin M.D.  Division of Cardiology  Department of Medicine

## 2018-06-16 NOTE — PLAN OF CARE
Problem: Patient Care Overview  Goal: Plan of Care/Patient Progress Review  Outcome: Improving  D/I: Extubated this am after successful vent PST. Tolerated well. Strong productive cough. O2 sats 99% on 2L. Hemodynamics stable. Woodruff cath d/grant. Started Terbutaline and weaning Isuprel gtt , maintaining HR >90. CRRT off this am when TMP too high to continue. HD run this pm for fluid removal 4 L - tolerated well. Remains anuric.  Slightly hypertensive w/ sB/P 150-130s. Taking CLD without problems. Advanced diet to low Na. Up to chair w/ minimal assist. Using IS independently. Talkative, in good spirits visiting w/ family and friends.   P: continue to monitor closely. Encourage increased activity as tolerated. Hydralazine prn for hypertension.

## 2018-06-16 NOTE — PROGRESS NOTES
Nephrology attending    S: Doing well this morning. Extubated. Denies fevers, chills, nausea, vomiting. CP tolerable. SOB same/better than prior to transplant. 4pt ROS otherwise negative. CRRT down this AM after neg 270 since MN.    O:   97.5   P96   R16   116/63   138/69   100% on 40%   CVP 14  Gen - nad  HENT - neck supple  CV - rrr  Resp - coarse bilaterally  Ext - trace edema    Labs  Cr 2.6   K 4.9   Phos 5.6    Medications reviewed    A/Rec: 62yo M with ESRD s/p heart transplant 6/14/18.  ESRD/volume - CRRT down this AM. Hemodynamically stable and volume up. Anticipate approximately 50-75ml obligate intake per hour. Appears 1-2L volume up. EDW 76kg.   - HD today with 4L UF over 4hr - if doesn't tolerate hemodynamically will go back to CRRT    Electrolytes - acceptable with mild hyperphosphatemia    Anemia - venofer once a week and epo 4000u every HD    Brice Caraballo MD  [phone]

## 2018-06-16 NOTE — PROGRESS NOTES
06/16/18 1123   Quick Adds   Type of Visit Occupational Therapy Re-evaluation   Living Environment   Living Environment Comment See initial eval from 4/19/18.   Self-Care   Activity/Exercise/Self-Care Comment See initial eval from 4/19/18.   Functional Level Prior   Prior Functional Level Comment Pt was IND with ADl at baseline. See initial eval from 4/19/18.   General Information   Onset of Illness/Injury or Date of Surgery - Date 04/16/18   Referring Physician Angelito Chance MD   Patient/Family Goals Statement pt did not state   Additional Occupational Profile Info/Pertinent History of Current Problem Mr. Murray Nicholson is a 63 year old male with a PMH of CAD, ICM (EF of 15-20%), ESRD, bicuspid aortic valve with AI, severe MR, and proximal AAA who was admitted for decompensated heart failure. He underwent OHT on 7/15/2018.   Precautions/Limitations sternal precautions;oxygen therapy device and L/min;fall precautions   General Observations Pt supine in bed with HOB elevated upon arrival,   General Info Comments Activity: advance activity as tolerated   Cognitive Status Examination   Orientation person;orientation to person, place and time   Level of Consciousness alert   Pain Assessment   Patient Currently in Pain No   Range of Motion (ROM)   ROM Comment Limited due to post surgical sternal precautions   Instrumental Activities of Daily Living (IADL)   IADL Comments See initial eval from 4/19/18.   Activities of Daily Living Analysis   Impairments Contributing to Impaired Activities of Daily Living fear and anxiety;post surgical precautions;strength decreased   General Therapy Interventions   Planned Therapy Interventions ADL retraining;IADL retraining;balance training;bed mobility training;strengthening;transfer training;home program guidelines;progressive activity/exercise;risk factor education   Clinical Impression   Criteria for Skilled Therapeutic Interventions Met yes, treatment indicated   OT Diagnosis  "OT/CR eval for post-op heart transplant   Influenced by the following impairments activity tolerance, post surgical precuations, fatigue   Assessment of Occupational Performance 3-5 Performance Deficits   Identified Performance Deficits dressing, bathing, toileting, home mgmt   Clinical Decision Making (Complexity) Low complexity   Therapy Frequency daily   Predicted Duration of Therapy Intervention (days/wks) 2 weeks   Anticipated Equipment Needs at Discharge (TBD with further therapy)   Anticipated Discharge Disposition Home with Outpatient Therapy;Transitional Care Facility   Risks and Benefits of Treatment have been explained. Yes   Patient, Family & other staff in agreement with plan of care Yes   [facility] TM \"6 Clicks\"   2016, Trustees of [facility], under license to [employer].  All rights reserved.   6 Clicks Short Forms Daily Activity Inpatient Short Form   [facility]  \"6 Clicks\" Daily Activity Inpatient Short Form   1. Putting on and taking off regular lower body clothing? 2 - A Lot   2. Bathing (including washing, rinsing, drying)? 2 - A Lot   3. Toileting, which includes using toilet, bedpan or urinal? 2 - A Lot   4. Putting on and taking off regular upper body clothing? 3 - A Little   5. Taking care of personal grooming such as brushing teeth? 3 - A Little   6. Eating meals? 1 - Total   Daily Activity Raw Score (Score out of 24.Lower scores equate to lower levels of function) 13   Total Evaluation Time   Total Evaluation Time (Minutes) 5     "

## 2018-06-16 NOTE — PLAN OF CARE
Problem: Patient Care Overview  Goal: Plan of Care/Patient Progress Review  Outcome: Improving  Pt stable overnight.  Neuro: On propofol gtt 10-30mcg/kg/min and fentanyl gtt at 100mcg/hr for sedation and pain control, lightened for PS this AM. PERRL, follows all commands throughout the night, no issues with pain control.   Resp: No changes to vent settings overnight.  Dorcas weaned off by 0400, PS 7/5 at 0600 this AM pt tolerating well, sitting up in bed using call light appropriately. Clear and diminished LS bilaterally, scant creamy/cloudy secretions via inline, coughs with suction.  CV: Remains on Isuprel gtt at 0.06mcg/kg/min to keep HR>90 (had VS order parameters updated to reflect this HR), Pulses 2+ throughout, Levo gtt on and off intermittently overnight to keep MAP >65 and off for SBP <130. CTOP 10-40ml q2hr serosanguineous drainage, CVP 13-15, PA 32-36/14-18, CI 4.9-5.8, SVR 500s, SVO2 off VBG draw 81 (CCO calibrated). GI/: No BMs, given senna as scheduled, anuric, TF advanced overnight to 40ml/hr per 10ml q4hr advancement order via [place], OG to LIS with OGOP approx 50ml q2hr.  Insulin gtt from 2-6nits/hr titrating for increasing TF and intermittent antbx.  No e- replacement, no products given.  Plans to potentially extubate this AM per MD orders, will continue to update team with any changes in condition per protocol.

## 2018-06-16 NOTE — PROGRESS NOTES
Cardiac ICU PROGRESS NOTE  June 16, 2018      CO-MORBIDITIES:   Chronic systolic heart failure (H)  (primary encounter diagnosis)  End stage renal disease (H)  Heart transplant recipient (H)    ASSESSMENT: Murray Nicholson is a 63 year old male CAD, ICM (EF of 15-20%), ESRD on HD, bicuspid aortic valve with AI, severe MR, and proximal AAA who was admitted for decompensated heart failure now s/p heart transplant on 6/14/18. Extubated uneventfully, weaned off of the nitric oxide easily.      TODAY'S PROGRESS:   -advance diet as tolerated  -dialysis with nephrology today  - hydralazine prn if htn, hold until after dialysis  - tramadol 50 q6h prn pain  - heparin 5000 u sq tid  - insulin sliding scale  - remove a line, swan, and cvc.   - keeping chest tube today     PLAN:  Neuro/ pain/ sedation:  - Monitor neurological status. Notify the MD for any acute changes in exam.  - tramadol for pain, hydromorphone iv prn also   - acetaminophen scheduled    Pulmonary care:   - supplemental O2 as needed  - pulmonary toilet encouraged today.      Cardiovascular:    - Monitor hemodynamic status.   - prn hydralazine if needed for BP control.   - pravastatin instead of atorvastatin for immunosuppression coverage.   - wean isoproterenol as tolerated to keep HR > 90  - immunosuppression per cardiology     GI care:   - Feeding tube today then advance diet     Fluids/ Electrolytes/ Nutrition:   - with IHD    Renal/ Fluid Balance:    - CRRT switched to IHD.      Endocrine:    - sliding scale insulin  - home glipizide tomorrow     ID/ Antibiotics:  - post transplant antibotics.      Heme:     - Hemoglobin stable.      Prophylaxis:    - Mechanical prophylaxis for DVT.  - heparin TID 5000 U SQ  - Protonix with steroids.      Lines/ tubes/ drains:  - dilaysis catheter, picc  - remove a line, swan, and cvc if off of drips.      Disposition:  - CV ICU     Patient seen, findings and plan discussed with CV ICU staff, Dr. Andree De Jesus  MD PGY-3  [phone]    ====================================    TODAY'S PROGRESS:   SUBJECTIVE:   - no issues post extubation, tolerated some sips of liquids, getting out of bed. Pain control adequate.        OBJECTIVE:   1. VITAL SIGNS:   Temp:  [96.4  F (35.8  C)-98.2  F (36.8  C)] 98.2  F (36.8  C)  Heart Rate:  [89-98] 93  Resp:  [16-36] 20  BP: (139-150)/(70-91) 150/91  MAP:  [61 mmHg-114 mmHg] 107 mmHg  Arterial Line BP: ()/(46-90) 147/81  FiO2 (%):  [40 %] 40 %  SpO2:  [97 %-100 %] 99 %  Ventilation Mode: CPAP/PS  (Continuous positive airway pressure with Pressure Support)  FiO2 (%): 40 %  Rate Set (breaths/minute): 16 breaths/min  Tidal Volume Set (mL): 500 mL  PEEP (cm H2O): 5 cmH2O  Pressure Support (cm H2O): 7 cmH2O  Oxygen Concentration (%): 40 %  Resp: 20    2. INTAKE/ OUTPUT:   I/O last 3 completed shifts:  In: 3288.38 [I.V.:2091.38; Other:87; NG/GT:330]  Out: 2241 [Emesis/NG output:425; Other:1466; Chest Tube:350]    3. PHYSICAL EXAMINATION:   General:   Neuro: A&Ox3, NAD  Resp: Breathing non-labored  CV: RRR, sinus tach in 100s on 0.06 isoproterenol  Abdomen: Soft, Non-distended, Non-tender  Incisions: c/d/i  Extremities: warm and well perfused    4. INVESTIGATIONS:   Arterial Blood Gases     Recent Labs  Lab 06/16/18  0828 06/16/18  0655 06/16/18  0334 06/15/18  2200   PH 7.43 7.43 7.46* 7.47*   PCO2 38 38 35 34*   PO2 120* 171* 180* 169*   HCO3 25 25 25 25     Complete Blood Count     Recent Labs  Lab 06/16/18  1043 06/16/18  0342 06/15/18  2200 06/15/18  1541   WBC 13.3* 12.3* 11.2* 10.8   HGB 8.7* 8.3* 8.2* 8.7*   * 145* 145* 135*     Basic Metabolic Panel    Recent Labs  Lab 06/16/18  1043 06/16/18  0342 06/15/18  2200 06/15/18  1541    134 136 136   POTASSIUM 5.0 4.9 4.6 5.0   CHLORIDE 98 100 101 100   CO2 24 23 22 23   BUN 22 19 19 20   CR 2.82* 2.64* 2.94* 3.14*   * 154* 116* 155*     Liver Function Tests    Recent Labs  Lab 06/16/18 0342 06/15/18  2200  06/15/18  1541 06/15/18  0958  06/15/18  0017 06/14/18  2220   * 107* 105* 100*  < > 82*  --    ALT 30 28 26 28  < > 25  --    ALKPHOS 85 83 83 81  < > 88  --    BILITOTAL 2.0* 2.7* 2.5* 2.8*  < > 1.8*  --    ALBUMIN 3.0* 3.0* 3.2* 3.2*  < > 2.8*  --    INR 1.16*  --  1.42*  --   --  1.36* 1.40*   < > = values in this interval not displayed.  Pancreatic Enzymes    Recent Labs  Lab 06/14/18  1207   AMYLASE 62     Coagulation Profile    Recent Labs  Lab 06/16/18  0342 06/15/18  1541 06/15/18  0017 06/14/18  2220   INR 1.16* 1.42* 1.36* 1.40*   PTT 31 33 38* 36     Lactate  Invalid input(s): LACTATE    5. RADIOLOGY:   Recent Results (from the past 24 hour(s))   XR Chest Port 1 View    Narrative    XR CHEST PORT 1 VW  6/16/2018 5:43 AM    History:  interval eval; .     Comparison: Chest radiograph dated 6/15/2018    Findings:   15 degree portable AP chest radiograph.  Endotracheal tube with the tip projects 6.9 cm there are bilateral  frank. Stable position of gastric and feeding tubes, mediastinal  drainage tube, [place]-Jax catheter, right chest tube, right central  venous catheter, left IJ central venous catheter and left PICC line.  Cardiac silhouette is slightly enlarged. No acute airspace opacities.  No appreciable pneumothorax or pleural effusion. The visualized upper  abdomen is unremarkable.      Impression    IMPRESSION:  1.  Stable supportive lines and tubes.  2.  Mild cardiomegaly with interstitial pulmonary edema.    I have personally reviewed the examination and initial interpretation  and I agree with the findings.    CLARICE VILLARREAL MD       =========================================

## 2018-06-16 NOTE — PLAN OF CARE
Problem: Patient Care Overview  Goal: Plan of Care/Patient Progress Review  OT/4C - Discharge Planner OT   Patient plan for discharge: pt states he is unsure at this time  Current status: Pt extubated this AM. Provided Heart Transplant Cardiac Rehab folder and educated on contents including post surgical sternal precautions in relation to early mobility. Pt declining OOB activity at this time stating he wants to take a nap; RN encourages nap at this time and reports she will assist with transfer to chair at a later time on this date. Dialysis planned for this PM, educated that therapist likely won't be able to return on this date. HR 97 bpm, , spO2 100% on 4.5L NC.   Barriers to return to prior living situation: post surgical precautions, activity tolerance  Recommendations for discharge: TCU  Rationale for recommendations: May progress to home with assist and OP CR once increased activity tolerance and IND with ADL is demonstrated during therapy session.        Entered by: Gita Shelley 06/16/2018 11:42 AM

## 2018-06-16 NOTE — PROGRESS NOTES
"CRRT STATUS NOTE    DATA:  Time:  9:46 AM  Filter Clotted, bedside RN returned blood and notified CRRT RN.     ASSESSMENT:  Patient Net Fluid Balance:  Yesterday +2L. Today thus far Net -300mL.   Vital Signs:  Levo off since early morning. B/P: 142/70 (101) on Isopril gtt, T: 97.5, P: 96, R: 22, O2 99% on min vent settings, PSTing.  Labs:  All stable.   Goals of Therapy:   \"Net -0-50mL/hr as BP allows, can hold off on restarting if circuit clots\"    INTERVENTIONS:   Notified Nephrology, Dr Daniels via text page of pt clotting off and recent I/Os, no pressors.     PLAN:  Will not restart CRRT unless orders received to do so.   Update: HD running at bedside with HD RN at 1400.   "

## 2018-06-16 NOTE — PROGRESS NOTES
06/16/18 1123   Quick Adds   Type of Visit Initial Occupational Therapy Evaluation   Living Environment   Living Environment Comment See initial eval from 4/19/18.   Self-Care   Activity/Exercise/Self-Care Comment See initial eval from 4/19/18.   Functional Level Prior   Prior Functional Level Comment Pt was IND with ADl at baseline. See initial eval from 4/19/18.   General Information   Onset of Illness/Injury or Date of Surgery - Date 04/16/18   Referring Physician Angelito Chance MD   Patient/Family Goals Statement pt did not state   Additional Occupational Profile Info/Pertinent History of Current Problem Mr. Murray Nicholson is a 63 year old male with a PMH of CAD, ICM (EF of 15-20%), ESRD, bicuspid aortic valve with AI, severe MR, and proximal AAA who was admitted for decompensated heart failure. He underwent OHT on 7/15/2018.   Precautions/Limitations sternal precautions;oxygen therapy device and L/min;fall precautions   General Observations Pt supine in bed with HOB elevated upon arrival,   General Info Comments Activity: advance activity as tolerated   Cognitive Status Examination   Orientation person;orientation to person, place and time   Level of Consciousness alert   Pain Assessment   Patient Currently in Pain No   Range of Motion (ROM)   ROM Comment Limited due to post surgical sternal precautions   Instrumental Activities of Daily Living (IADL)   IADL Comments See initial eval from 4/19/18.   Activities of Daily Living Analysis   Impairments Contributing to Impaired Activities of Daily Living fear and anxiety;post surgical precautions;strength decreased   General Therapy Interventions   Planned Therapy Interventions ADL retraining;IADL retraining;balance training;bed mobility training;strengthening;transfer training;home program guidelines;progressive activity/exercise;risk factor education   Clinical Impression   Criteria for Skilled Therapeutic Interventions Met yes, treatment indicated   OT  "Diagnosis OT/CR eval for post-op heart transplant   Influenced by the following impairments activity tolerance, post surgical precuations, fatigue   Assessment of Occupational Performance 3-5 Performance Deficits   Identified Performance Deficits dressing, bathing, toileting, home mgmt   Clinical Decision Making (Complexity) Low complexity   Therapy Frequency daily   Predicted Duration of Therapy Intervention (days/wks) 2 weeks   Anticipated Equipment Needs at Discharge (TBD with further therapy)   Anticipated Discharge Disposition Home with Outpatient Therapy;Transitional Care Facility   Risks and Benefits of Treatment have been explained. Yes   Patient, Family & other staff in agreement with plan of care Yes   [facility] TM \"6 Clicks\"   2016, Trustees of [facility], under license to [employer].  All rights reserved.   6 Clicks Short Forms Daily Activity Inpatient Short Form   [facility]  \"6 Clicks\" Daily Activity Inpatient Short Form   1. Putting on and taking off regular lower body clothing? 2 - A Lot   2. Bathing (including washing, rinsing, drying)? 2 - A Lot   3. Toileting, which includes using toilet, bedpan or urinal? 2 - A Lot   4. Putting on and taking off regular upper body clothing? 3 - A Little   5. Taking care of personal grooming such as brushing teeth? 3 - A Little   6. Eating meals? 1 - Total   Daily Activity Raw Score (Score out of 24.Lower scores equate to lower levels of function) 13   Total Evaluation Time   Total Evaluation Time (Minutes) 5     "

## 2018-06-16 NOTE — PROGRESS NOTES
CRRT STATUS NOTE    DATA:  Time:  5:01 AM  Pressures WNL:  YES  Filter Status:  WDL    Problems Reported/Alarms Noted:  NA    Supplies Present:  YES    ASSESSMENT:  Patient Net Fluid Balance:  5/15: +2.1L, 6/16: -32 ml  Vital Signs: Temp: 97.5  F (36.4  C) Temp src: Pulmonary Artery     Heart Rate: 96 Resp: 16 SpO2: 100 % O2 Device: Mechanical Ventilator  /60(78)  Labs:  K 4.9, Cr 2.64, Mag 2.6, Phos 5.6, A 7.46/35/180/25, WBC 12.5  Goals of Therapy:  Net negative 0-50    INTERVENTIONS:   Checked in with bedside RN.    PLAN:  Continue CVVHDF per MD orders. Contact CRRT RN with questions and concerns at [phone].

## 2018-06-17 ENCOUNTER — APPOINTMENT (OUTPATIENT)
Dept: GENERAL RADIOLOGY | Facility: CLINIC | Age: 63
DRG: 001 | End: 2018-06-17
Attending: STUDENT IN AN ORGANIZED HEALTH CARE EDUCATION/TRAINING PROGRAM
Payer: COMMERCIAL

## 2018-06-17 ENCOUNTER — APPOINTMENT (OUTPATIENT)
Dept: OCCUPATIONAL THERAPY | Facility: CLINIC | Age: 63
DRG: 001 | End: 2018-06-17
Attending: INTERNAL MEDICINE
Payer: COMMERCIAL

## 2018-06-17 LAB
ACID FAST STN SPEC QL: NORMAL
ACID FAST STN SPEC QL: NORMAL
ALBUMIN SERPL-MCNC: 3.5 G/DL (ref 3.4–5)
ALP SERPL-CCNC: 105 U/L (ref 40–150)
ALT SERPL W P-5'-P-CCNC: 33 U/L (ref 0–70)
ANION GAP SERPL CALCULATED.3IONS-SCNC: 12 MMOL/L (ref 3–14)
ANION GAP SERPL CALCULATED.3IONS-SCNC: 13 MMOL/L (ref 3–14)
APTT PPP: 30 SEC (ref 22–37)
AST SERPL W P-5'-P-CCNC: 92 U/L (ref 0–45)
BASOPHILS # BLD AUTO: 0 10E9/L (ref 0–0.2)
BASOPHILS NFR BLD AUTO: 0 %
BILIRUB SERPL-MCNC: 1.1 MG/DL (ref 0.2–1.3)
BUN SERPL-MCNC: 22 MG/DL (ref 7–30)
BUN SERPL-MCNC: 34 MG/DL (ref 7–30)
CA-I BLD-MCNC: 4.6 MG/DL (ref 4.4–5.2)
CA-I BLD-MCNC: 4.9 MG/DL (ref 4.4–5.2)
CALCIUM SERPL-MCNC: 9.6 MG/DL (ref 8.5–10.1)
CALCIUM SERPL-MCNC: 9.6 MG/DL (ref 8.5–10.1)
CHLORIDE SERPL-SCNC: 94 MMOL/L (ref 94–109)
CHLORIDE SERPL-SCNC: 94 MMOL/L (ref 94–109)
CO2 SERPL-SCNC: 23 MMOL/L (ref 20–32)
CO2 SERPL-SCNC: 24 MMOL/L (ref 20–32)
CREAT SERPL-MCNC: 2.42 MG/DL (ref 0.66–1.25)
CREAT SERPL-MCNC: 3.48 MG/DL (ref 0.66–1.25)
DIFFERENTIAL METHOD BLD: ABNORMAL
EOSINOPHIL # BLD AUTO: 0 10E9/L (ref 0–0.7)
EOSINOPHIL NFR BLD AUTO: 0 %
ERYTHROCYTE [DISTWIDTH] IN BLOOD BY AUTOMATED COUNT: 17.5 % (ref 10–15)
GFR SERPL CREATININE-BSD FRML MDRD: 18 ML/MIN/1.7M2
GFR SERPL CREATININE-BSD FRML MDRD: 27 ML/MIN/1.7M2
GLUCOSE BLDC GLUCOMTR-MCNC: 100 MG/DL (ref 70–99)
GLUCOSE BLDC GLUCOMTR-MCNC: 116 MG/DL (ref 70–99)
GLUCOSE BLDC GLUCOMTR-MCNC: 121 MG/DL (ref 70–99)
GLUCOSE BLDC GLUCOMTR-MCNC: 124 MG/DL (ref 70–99)
GLUCOSE BLDC GLUCOMTR-MCNC: 140 MG/DL (ref 70–99)
GLUCOSE BLDC GLUCOMTR-MCNC: 141 MG/DL (ref 70–99)
GLUCOSE BLDC GLUCOMTR-MCNC: 144 MG/DL (ref 70–99)
GLUCOSE BLDC GLUCOMTR-MCNC: 170 MG/DL (ref 70–99)
GLUCOSE BLDC GLUCOMTR-MCNC: 171 MG/DL (ref 70–99)
GLUCOSE BLDC GLUCOMTR-MCNC: 210 MG/DL (ref 70–99)
GLUCOSE BLDC GLUCOMTR-MCNC: 59 MG/DL (ref 70–99)
GLUCOSE BLDC GLUCOMTR-MCNC: 59 MG/DL (ref 70–99)
GLUCOSE BLDC GLUCOMTR-MCNC: 66 MG/DL (ref 70–99)
GLUCOSE BLDC GLUCOMTR-MCNC: 93 MG/DL (ref 70–99)
GLUCOSE SERPL-MCNC: 168 MG/DL (ref 70–99)
GLUCOSE SERPL-MCNC: 172 MG/DL (ref 70–99)
HCT VFR BLD AUTO: 28.7 % (ref 40–53)
HGB BLD-MCNC: 9.2 G/DL (ref 13.3–17.7)
IMM GRANULOCYTES # BLD: 0 10E9/L (ref 0–0.4)
IMM GRANULOCYTES NFR BLD: 0.1 %
INR PPP: 1.03 (ref 0.86–1.14)
LYMPHOCYTES # BLD AUTO: 0.6 10E9/L (ref 0.8–5.3)
LYMPHOCYTES NFR BLD AUTO: 3.9 %
MAGNESIUM SERPL-MCNC: 2 MG/DL (ref 1.6–2.3)
MAGNESIUM SERPL-MCNC: 2.2 MG/DL (ref 1.6–2.3)
MCH RBC QN AUTO: 28.8 PG (ref 26.5–33)
MCHC RBC AUTO-ENTMCNC: 32.1 G/DL (ref 31.5–36.5)
MCV RBC AUTO: 90 FL (ref 78–100)
MONOCYTES # BLD AUTO: 0.2 10E9/L (ref 0–1.3)
MONOCYTES NFR BLD AUTO: 1.1 %
NEUTROPHILS # BLD AUTO: 14 10E9/L (ref 1.6–8.3)
NEUTROPHILS NFR BLD AUTO: 94.9 %
NRBC # BLD AUTO: 0 10*3/UL
NRBC BLD AUTO-RTO: 0 /100
PHOSPHATE SERPL-MCNC: 4.3 MG/DL (ref 2.5–4.5)
PHOSPHATE SERPL-MCNC: 4.3 MG/DL (ref 2.5–4.5)
PLATELET # BLD AUTO: 139 10E9/L (ref 150–450)
POTASSIUM SERPL-SCNC: 4.5 MMOL/L (ref 3.4–5.3)
POTASSIUM SERPL-SCNC: 4.6 MMOL/L (ref 3.4–5.3)
PROT SERPL-MCNC: 7.2 G/DL (ref 6.8–8.8)
RBC # BLD AUTO: 3.19 10E12/L (ref 4.4–5.9)
SODIUM SERPL-SCNC: 130 MMOL/L (ref 133–144)
SODIUM SERPL-SCNC: 131 MMOL/L (ref 133–144)
SPECIMEN SOURCE: NORMAL
TACROLIMUS BLD-MCNC: <3 UG/L (ref 5–15)
TME LAST DOSE: ABNORMAL H
WBC # BLD AUTO: 14.7 10E9/L (ref 4–11)

## 2018-06-17 PROCEDURE — 21400003 ZZH R&B CCU CRITICAL UMMC

## 2018-06-17 PROCEDURE — 83735 ASSAY OF MAGNESIUM: CPT | Performed by: INTERNAL MEDICINE

## 2018-06-17 PROCEDURE — A9270 NON-COVERED ITEM OR SERVICE: HCPCS | Mod: GY | Performed by: STUDENT IN AN ORGANIZED HEALTH CARE EDUCATION/TRAINING PROGRAM

## 2018-06-17 PROCEDURE — 25000131 ZZH RX MED GY IP 250 OP 636 PS 637: Performed by: THORACIC SURGERY (CARDIOTHORACIC VASCULAR SURGERY)

## 2018-06-17 PROCEDURE — 85730 THROMBOPLASTIN TIME PARTIAL: CPT | Performed by: INTERNAL MEDICINE

## 2018-06-17 PROCEDURE — 85610 PROTHROMBIN TIME: CPT | Performed by: INTERNAL MEDICINE

## 2018-06-17 PROCEDURE — 25000132 ZZH RX MED GY IP 250 OP 250 PS 637: Mod: GY | Performed by: STUDENT IN AN ORGANIZED HEALTH CARE EDUCATION/TRAINING PROGRAM

## 2018-06-17 PROCEDURE — 25000125 ZZHC RX 250: Performed by: FAMILY MEDICINE

## 2018-06-17 PROCEDURE — 71045 X-RAY EXAM CHEST 1 VIEW: CPT

## 2018-06-17 PROCEDURE — 97530 THERAPEUTIC ACTIVITIES: CPT | Mod: GO

## 2018-06-17 PROCEDURE — 25000131 ZZH RX MED GY IP 250 OP 636 PS 637: Performed by: STUDENT IN AN ORGANIZED HEALTH CARE EDUCATION/TRAINING PROGRAM

## 2018-06-17 PROCEDURE — 25000132 ZZH RX MED GY IP 250 OP 250 PS 637: Mod: GY | Performed by: THORACIC SURGERY (CARDIOTHORACIC VASCULAR SURGERY)

## 2018-06-17 PROCEDURE — 00000146 ZZHCL STATISTIC GLUCOSE BY METER IP

## 2018-06-17 PROCEDURE — A9270 NON-COVERED ITEM OR SERVICE: HCPCS | Mod: GY | Performed by: PHYSICIAN ASSISTANT

## 2018-06-17 PROCEDURE — 85025 COMPLETE CBC W/AUTO DIFF WBC: CPT | Performed by: INTERNAL MEDICINE

## 2018-06-17 PROCEDURE — 80197 ASSAY OF TACROLIMUS: CPT | Performed by: STUDENT IN AN ORGANIZED HEALTH CARE EDUCATION/TRAINING PROGRAM

## 2018-06-17 PROCEDURE — 84100 ASSAY OF PHOSPHORUS: CPT | Performed by: INTERNAL MEDICINE

## 2018-06-17 PROCEDURE — 40000133 ZZH STATISTIC OT WARD VISIT

## 2018-06-17 PROCEDURE — 40000893 ZZH STATISTIC PT IP EVAL DEFER

## 2018-06-17 PROCEDURE — 25000132 ZZH RX MED GY IP 250 OP 250 PS 637: Performed by: INTERNAL MEDICINE

## 2018-06-17 PROCEDURE — 25000132 ZZH RX MED GY IP 250 OP 250 PS 637: Performed by: PHYSICIAN ASSISTANT

## 2018-06-17 PROCEDURE — 82330 ASSAY OF CALCIUM: CPT | Performed by: INTERNAL MEDICINE

## 2018-06-17 PROCEDURE — 80053 COMPREHEN METABOLIC PANEL: CPT | Performed by: INTERNAL MEDICINE

## 2018-06-17 PROCEDURE — 25000125 ZZHC RX 250: Performed by: INTERNAL MEDICINE

## 2018-06-17 PROCEDURE — A9270 NON-COVERED ITEM OR SERVICE: HCPCS | Mod: GY | Performed by: THORACIC SURGERY (CARDIOTHORACIC VASCULAR SURGERY)

## 2018-06-17 PROCEDURE — 25000128 H RX IP 250 OP 636: Performed by: STUDENT IN AN ORGANIZED HEALTH CARE EDUCATION/TRAINING PROGRAM

## 2018-06-17 PROCEDURE — 80048 BASIC METABOLIC PNL TOTAL CA: CPT | Performed by: INTERNAL MEDICINE

## 2018-06-17 PROCEDURE — 25000131 ZZH RX MED GY IP 250 OP 636 PS 637: Performed by: INTERNAL MEDICINE

## 2018-06-17 PROCEDURE — 99024 POSTOP FOLLOW-UP VISIT: CPT | Performed by: INTERNAL MEDICINE

## 2018-06-17 PROCEDURE — 25000128 H RX IP 250 OP 636: Performed by: FAMILY MEDICINE

## 2018-06-17 RX ORDER — VALGANCICLOVIR HYDROCHLORIDE 50 MG/ML
450 POWDER, FOR SOLUTION ORAL
Status: DISCONTINUED | OUTPATIENT
Start: 2018-06-18 | End: 2018-06-25

## 2018-06-17 RX ORDER — HYDRALAZINE HYDROCHLORIDE 20 MG/ML
10 INJECTION INTRAMUSCULAR; INTRAVENOUS ONCE
Status: COMPLETED | OUTPATIENT
Start: 2018-06-17 | End: 2018-06-17

## 2018-06-17 RX ADMIN — HYDRALAZINE HYDROCHLORIDE 10 MG: 20 INJECTION INTRAMUSCULAR; INTRAVENOUS at 03:11

## 2018-06-17 RX ADMIN — HEPARIN SODIUM 5000 UNITS: 5000 INJECTION, SOLUTION INTRAVENOUS; SUBCUTANEOUS at 12:27

## 2018-06-17 RX ADMIN — GLIPIZIDE 5 MG: 5 TABLET, FILM COATED, EXTENDED RELEASE ORAL at 08:48

## 2018-06-17 RX ADMIN — Medication 1 CAPSULE: at 08:46

## 2018-06-17 RX ADMIN — SENNOSIDES AND DOCUSATE SODIUM 1 TABLET: 8.6; 5 TABLET ORAL at 20:19

## 2018-06-17 RX ADMIN — Medication 1 HALF-TAB: at 20:18

## 2018-06-17 RX ADMIN — NYSTATIN 1000000 UNITS: 100000 SUSPENSION ORAL at 08:47

## 2018-06-17 RX ADMIN — PRAVASTATIN SODIUM 40 MG: 40 TABLET ORAL at 08:53

## 2018-06-17 RX ADMIN — NYSTATIN 1000000 UNITS: 100000 SUSPENSION ORAL at 20:18

## 2018-06-17 RX ADMIN — HEPARIN SODIUM 5000 UNITS: 5000 INJECTION, SOLUTION INTRAVENOUS; SUBCUTANEOUS at 20:19

## 2018-06-17 RX ADMIN — ACETAMINOPHEN 975 MG: 325 TABLET, FILM COATED ORAL at 08:46

## 2018-06-17 RX ADMIN — HEPARIN SODIUM 5000 UNITS: 5000 INJECTION, SOLUTION INTRAVENOUS; SUBCUTANEOUS at 03:12

## 2018-06-17 RX ADMIN — Medication 0.5 MG: at 08:48

## 2018-06-17 RX ADMIN — TERBUTALINE SULFATE 5 MG: 5 TABLET ORAL at 12:27

## 2018-06-17 RX ADMIN — TERBUTALINE SULFATE 5 MG: 5 TABLET ORAL at 04:50

## 2018-06-17 RX ADMIN — Medication 1 MG: at 17:26

## 2018-06-17 RX ADMIN — INSULIN ASPART 3 UNITS: 100 INJECTION, SOLUTION INTRAVENOUS; SUBCUTANEOUS at 14:54

## 2018-06-17 RX ADMIN — ACETAMINOPHEN 975 MG: 325 TABLET, FILM COATED ORAL at 17:26

## 2018-06-17 RX ADMIN — PREDNISONE 40 MG: 20 TABLET ORAL at 20:18

## 2018-06-17 RX ADMIN — HYDRALAZINE HYDROCHLORIDE 10 MG: 20 INJECTION INTRAMUSCULAR; INTRAVENOUS at 01:08

## 2018-06-17 RX ADMIN — NYSTATIN 1000000 UNITS: 100000 SUSPENSION ORAL at 15:57

## 2018-06-17 RX ADMIN — PREDNISONE 40 MG: 20 TABLET ORAL at 08:47

## 2018-06-17 RX ADMIN — TERBUTALINE SULFATE 5 MG: 5 TABLET ORAL at 20:54

## 2018-06-17 RX ADMIN — PANTOPRAZOLE SODIUM 40 MG: 40 TABLET, DELAYED RELEASE ORAL at 08:46

## 2018-06-17 RX ADMIN — ACETAMINOPHEN 975 MG: 325 TABLET, FILM COATED ORAL at 01:09

## 2018-06-17 RX ADMIN — MYCOPHENOLATE MOFETIL 1500 MG: 250 CAPSULE ORAL at 08:45

## 2018-06-17 RX ADMIN — NYSTATIN 1000000 UNITS: 100000 SUSPENSION ORAL at 12:27

## 2018-06-17 RX ADMIN — HUMAN INSULIN 3 UNITS/HR: 100 INJECTION, SOLUTION SUBCUTANEOUS at 05:20

## 2018-06-17 RX ADMIN — VALGANCICLOVIR HYDROCHLORIDE 450 MG: 50 POWDER, FOR SOLUTION ORAL at 08:48

## 2018-06-17 RX ADMIN — HYDRALAZINE HYDROCHLORIDE 10 MG: 20 INJECTION INTRAMUSCULAR; INTRAVENOUS at 20:24

## 2018-06-17 RX ADMIN — MYCOPHENOLATE MOFETIL 1500 MG: 250 CAPSULE ORAL at 17:26

## 2018-06-17 NOTE — PLAN OF CARE
Problem: Patient Care Overview  Goal: Plan of Care/Patient Progress Review  Outcome: No Change  D: Heart transplant    I/A: BP systolic greater than 150, hydralazine given with minimal response- MD notified and extra dose of hydralazine obtained, MD updated. HR maintained greater than 90 on Isuprel. Had a good BM overnight. Tolerating diet well. Obtained order to continue insulin gtt overnight. Pain managed well with Ultram and tylenol. Up with SBA.    P: Coordinate plan for BP's. Possible transfer orders?

## 2018-06-17 NOTE — PLAN OF CARE
Problem: Patient Care Overview  Goal: Plan of Care/Patient Progress Review  Outcome: Improving  Pt in good spirits today. Up in chair with SBA, up walking in the halls with OT. Good appetite, BM last night. Anuric. Education done on new transplant drugs. Transferred up to 6C after report called. All belongings sent. Pt very happy to be leaving the ICU, very motivated to increase activity and go home soon.

## 2018-06-17 NOTE — PLAN OF CARE
Problem: Cardiac: Heart Failure (Adult)  Goal: Signs and Symptoms of Listed Potential Problems Will be Absent, Minimized or Managed (Cardiac: Heart Failure)  Signs and symptoms of listed potential problems will be absent, minimized or managed by discharge/transition of care (reference Cardiac: Heart Failure (Adult) CPG).   Outcome: No Change  Pt transferred from  via wheelchair  Accompanied by: Son; friends  Arrived on 6C at 1530  Pt belongings: Glasses, clothing, shoes, and cell phone at bedside. Rest of personal belongings organized by son in room. Did not want anything sent down to security.  Teaching: Call don't fall, use of console, meal times, visiting hours, orientation to unit, when to call for the RN (angina/sob/dizzyness, etc.), and stressed the importance of safety.  Access: PIV, PICC x2, CVC  Telemetry: Placed on pt

## 2018-06-17 NOTE — PROGRESS NOTES
HEMODIALYSIS TREATMENT NOTE    Date: 6/16/2018  Time: 7:10 PM    Data:  Pre Wt: 69.4 kg (153 lb)   Desired Wt: 65.4 kg   Post Wt: 66 kg (145 lb 8.1 oz)  Weight gain: -3.4 kg   Weight change: 3.4 kg  Ultrafiltration - Post Run Net Total Removed (mL): 4000 mL  Ultrafiltration - Post Run Net Total Gain (mL): 0 mL  Vascular Access Status: Yes, secured and visible  Dialyzer Rinse: Light  Total Blood Volume Processed: 80.1  Total Dialysis (Treatment) Time:  4 hours    Lab:   No    Interventions:  Stable HD tx completed over 4 hours via IJ CVC.  4L UF removed.  First HD post heart transplant and after d/c of CRRT this AM.  Heparin administered throughout tx to prevent HD lines from clotting, total amount administered 3500u- ACT monitored hourly.  CVC patent and functioning well, CVC heparin locked per order.  Epogen administered per order.  Obtained bed pre and post wt, bedside standing scale was not working.  K4 Ca3 bath used per order of nephrologist, MD Caraballo instead of using protocol for dialysate bath.  Hand off given to primary RN on 4E.    Assessment:  VSS throughout, pt hypertensive at times.  Primary RN held hydralazine until post HD.  Pt in good spirits, calm and cooperative.  Pt tolerated HD and UF well without complications or need for interventions.     Plan:    Per renal team.

## 2018-06-17 NOTE — PROGRESS NOTES
Cardiac ICU PROGRESS NOTE  June 17, 2018        CO-MORBIDITIES:   Chronic systolic heart failure (H)  (primary encounter diagnosis)  End stage renal disease (H)  Heart transplant recipient (H)     ASSESSMENT: Murray Nicholson is a 63 year old male CAD, ICM (EF of 15-20%), ESRD on HD, bicuspid aortic valve with AI, severe MR, and proximal AAA who was admitted for decompensated heart failure now s/p heart transplant on 6/14/18. Extubated uneventfully, weaned off of the nitric oxide easily.     TODAY'S PROGRESS:   - transfer to floor  - terbutaline 5mg Q8h  - keeping chest tubes today, will split to potentially ease removal.      PLAN:  Neuro/ pain/ sedation:  - Monitor neurological status. Notify the MD for any acute changes in exam.  - tramadol for pain, hydromorphone iv prn also   - acetaminophen scheduled     Pulmonary care:   - supplemental O2 as needed  - pulmonary toilet encouraged today.       Cardiovascular:    - Monitor hemodynamic status.   - prn hydralazine if needed for BP control.   - pravastatin 40 daily  - terbutaline 5mg q8h  - immunosuppression per cardiology      GI care:   - Feeding tube today then advance diet      Fluids/ Electrolytes/ Nutrition:   - with IHD    Renal/ Fluid Balance:    - CRRT switched to IHD.       Endocrine:    - sliding scale insulin  - home glipizide       ID/ Antibiotics:  - post transplant antibotics.       Heme:     - Hemoglobin stable.       Prophylaxis:    - Mechanical prophylaxis for DVT.  - heparin TID 5000 U SQ  - Protonix with steroids.       Lines/ tubes/ drains:  - dilaysis catheter, picc     Disposition:  - CV ICU      Patient seen, findings and plan discussed with CV ICU staff, Dr. Andree De Jesus MD PGY-3  [phone]  ====================================    TODAY'S PROGRESS:   SUBJECTIVE:   - Continues to progress well. Tolerating more enteral intake, had a bowel movement, pain minimal.        OBJECTIVE:   1. VITAL SIGNS:   Temp:  [97.8  F (36.6   C)-98.3  F (36.8  C)] 97.9  F (36.6  C)  Heart Rate:  [85-98] 85  Resp:  [18-20] 18  BP: (114-171)/() 135/86  MAP:  [99 mmHg-119 mmHg] 104 mmHg  Arterial Line BP: (129-172)/(75-90) 143/76  SpO2:  [93 %-100 %] 98 %  Ventilation Mode: CPAP/PS  (Continuous positive airway pressure with Pressure Support)  FiO2 (%): 40 %  Rate Set (breaths/minute): 16 breaths/min  Tidal Volume Set (mL): 500 mL  PEEP (cm H2O): 5 cmH2O  Pressure Support (cm H2O): 7 cmH2O  Oxygen Concentration (%): 40 %  Resp: 18    2. INTAKE/ OUTPUT:   I/O last 3 completed shifts:  In: 1222.09 [P.O.:430; I.V.:472.09; NG/GT:60]  Out: 4444 [Other:4304; Chest Tube:140]    3. PHYSICAL EXAMINATION:   General:   Neuro: A&Ox3, NAD  Resp: Breathing non-labored  CV: RRR  Abdomen: Soft, Non-distended, Non-tender  Incisions: c/d/i, chest tubes with serosanguinous output.  Extremities: warm and well perfused    4. INVESTIGATIONS:   Arterial Blood Gases     Recent Labs  Lab 06/16/18  0828 06/16/18  0655 06/16/18  0334 06/15/18  2200   PH 7.43 7.43 7.46* 7.47*   PCO2 38 38 35 34*   PO2 120* 171* 180* 169*   HCO3 25 25 25 25     Complete Blood Count     Recent Labs  Lab 06/17/18  0433 06/16/18  1850 06/16/18  1043 06/16/18  0342   WBC 14.7* 15.8* 13.3* 12.3*   HGB 9.2* 9.4* 8.7* 8.3*   * 130* 125* 145*     Basic Metabolic Panel    Recent Labs  Lab 06/17/18  0433 06/16/18  1043 06/16/18  0342 06/15/18  2200   * 135 134 136   POTASSIUM 4.5 5.0 4.9 4.6   CHLORIDE 94 98 100 101   CO2 24 24 23 22   BUN 22 22 19 19   CR 2.42* 2.82* 2.64* 2.94*   * 154* 154* 116*     Liver Function Tests    Recent Labs  Lab 06/17/18  0433 06/16/18  0342 06/15/18  2200 06/15/18  1541  06/15/18  0017   AST 92* 111* 107* 105*  < > 82*   ALT 33 30 28 26  < > 25   ALKPHOS 105 85 83 83  < > 88   BILITOTAL 1.1 2.0* 2.7* 2.5*  < > 1.8*   ALBUMIN 3.5 3.0* 3.0* 3.2*  < > 2.8*   INR 1.03 1.16*  --  1.42*  --  1.36*   < > = values in this interval not displayed.  Pancreatic  Enzymes    Recent Labs  Lab 06/14/18  1207   AMYLASE 62     Coagulation Profile    Recent Labs  Lab 06/17/18  0433 06/16/18  0342 06/15/18  1541 06/15/18  0017   INR 1.03 1.16* 1.42* 1.36*   PTT 30 31 33 38*     Lactate  Invalid input(s): LACTATE    5. RADIOLOGY:   Recent Results (from the past 24 hour(s))   XR Chest Port 1 View    Narrative    Exam: XR CHEST PORT 1 VW, 6/17/2018 3:31 AM    Indication: interval eval;     Comparison: Radiograph on 6/16/2018    Findings:   Single supine frontal view of chest.  Interval removal of endotracheal tube, [place]-Jax catheter, enteric  tubes. Stable right IJ dual-lumen catheter, mediastinal drains, left  IJ sheath, left side upper extremity PICC line, mediastinal drain and  right chest tube.    Stable cardiomediastinal silhouette. Persistent low lung volumes. No  pneumothorax. Unchanged streaky retrocardiac opacities.      Impression    Impression:   1. Interval removal of endotracheal tube, [place]-Jax catheter, enteric  tubes. Otherwise, stable support devices.  2. Unchanged streaky retrocardiac opacities, atelectasis versus  consolidation.  3. Stable mild cardiomegaly and pulmonary edema.    I have personally reviewed the examination and initial interpretation  and I agree with the findings.    PATO PAULSON MD       =========================================

## 2018-06-17 NOTE — ANESTHESIA POSTPROCEDURE EVALUATION
Patient: Murray Nicholson    Procedure(s):  Median Sternotomy, on-pump oxygenator, Heart Transplant    Diagnosis:Myopathy  Diagnosis Additional Information: No value filed.    Anesthesia Type:  No value filed.    Note:  Anesthesia Post Evaluation    Patient location during evaluation: ICU  Patient participation: Unable to evaluate secondary to administered sedation  Level of consciousness: obtunded/minimal responses  Pain management: adequate  Airway patency: patent  Cardiovascular status: hemodynamically stable  Respiratory status: acceptable  Hydration status: acceptable  PONV: none     Anesthetic complications: None    Comments: Patient transported to ICU intubated,sedated, ventilated with 100%O2, fully monitored.  VSS during transport.  Patient left in stable condition in the care of ICU team.  Full report given.        Last vitals:  Vitals:    06/17/18 0430 06/17/18 0445 06/17/18 0500   BP: 155/87 150/85 154/88   Pulse:      Resp:      Temp:      SpO2:            Electronically Signed By: Christine Decker MD  June 17, 2018  5:25 AM

## 2018-06-17 NOTE — PROGRESS NOTES
CVTS PROGRESS NOTE  June 17, 2018          CO-MORBIDITIES:   Chronic systolic heart failure (H)  (primary encounter diagnosis)  End stage renal disease (H)  Heart transplant recipient (H)      ASSESSMENT: Murray Nicholson is a 63 year old male CAD, ICM (EF of 15-20%), ESRD on HD, bicuspid aortic valve with AI, severe MR, and proximal AAA who was admitted for decompensated heart failure now s/p heart transplant on 6/14/18. Extubated uneventfully, weaned off of the nitric oxide easily.      TODAY'S PROGRESS:   - transfer to floor  - terbutaline 5mg Q8h  - keeping chest tubes today, will split to potentially ease removal.      PLAN:  Neuro/ pain/ sedation:  - Monitor neurological status. Notify the MD for any acute changes in exam.  - tramadol for pain, hydromorphone iv prn also   - acetaminophen scheduled      Pulmonary care:   - supplemental O2 as needed  - pulmonary toilet encouraged today.       Cardiovascular:    - Monitor hemodynamic status.   - prn hydralazine if needed for BP control.   - pravastatin 40 daily  - terbutaline 5mg q8h  - immunosuppression per cardiology      GI care:   - Feeding tube today then advance diet      Fluids/ Electrolytes/ Nutrition:   - with IHD    Renal/ Fluid Balance:    - CRRT switched to IHD.       Endocrine:    - sliding scale insulin  - home glipizide       ID/ Antibiotics:  - post transplant antibotics.       Heme:     - Hemoglobin stable.       Prophylaxis:    - Mechanical prophylaxis for DVT.  - heparin TID 5000 U SQ  - Protonix with steroids.       Lines/ tubes/ drains:  - dilaysis catheter, picc      Disposition:  - CV ICU      Patient seen, findings and plan discussed with CVTS Fellow Dr. Chance.       Mario De Jesus MD PGY-3  [phone]  ====================================     TODAY'S PROGRESS:   SUBJECTIVE:   - Continues to progress well. Tolerating more enteral intake, had a bowel movement, pain minimal.          OBJECTIVE:   1. VITAL SIGNS:   Temp:  [97.8  F (36.6  C)-98.3   F (36.8  C)] 97.9  F (36.6  C)  Heart Rate:  [85-98] 85  Resp:  [18-20] 18  BP: (114-171)/() 135/86  MAP:  [99 mmHg-119 mmHg] 104 mmHg  Arterial Line BP: (129-172)/(75-90) 143/76  SpO2:  [93 %-100 %] 98 %  Ventilation Mode: CPAP/PS  (Continuous positive airway pressure with Pressure Support)  FiO2 (%): 40 %  Rate Set (breaths/minute): 16 breaths/min  Tidal Volume Set (mL): 500 mL  PEEP (cm H2O): 5 cmH2O  Pressure Support (cm H2O): 7 cmH2O  Oxygen Concentration (%): 40 %  Resp: 18   2. INTAKE/ OUTPUT:   I/O last 3 completed shifts:  In: 1222.09 [P.O.:430; I.V.:472.09; NG/GT:60]  Out: 4444 [Other:4304; Chest Tube:140]     3. PHYSICAL EXAMINATION:   General:   Neuro: A&Ox3, NAD  Resp: Breathing non-labored  CV: RRR  Abdomen: Soft, Non-distended, Non-tender  Incisions: c/d/i, chest tubes with serosanguinous output.  Extremities: warm and well perfused   4. INVESTIGATIONS:   Arterial Blood Gases      Recent Labs  Lab 06/16/18  0828 06/16/18  0655 06/16/18  0334 06/15/18  2200   PH 7.43 7.43 7.46* 7.47*   PCO2 38 38 35 34*   PO2 120* 171* 180* 169*   HCO3 25 25 25 25      Complete Blood Count      Recent Labs  Lab 06/17/18  0433 06/16/18  1850 06/16/18  1043 06/16/18  0342   WBC 14.7* 15.8* 13.3* 12.3*   HGB 9.2* 9.4* 8.7* 8.3*   * 130* 125* 145*      Basic Metabolic Panel     Recent Labs  Lab 06/17/18  0433 06/16/18  1043 06/16/18  0342 06/15/18  2200   * 135 134 136   POTASSIUM 4.5 5.0 4.9 4.6   CHLORIDE 94 98 100 101   CO2 24 24 23 22   BUN 22 22 19 19   CR 2.42* 2.82* 2.64* 2.94*   * 154* 154* 116*      Liver Function Tests     Recent Labs  Lab 06/17/18  0433 06/16/18  0342 06/15/18  2200 06/15/18  1541   06/15/18  0017   AST 92* 111* 107* 105*  < > 82*   ALT 33 30 28 26  < > 25   ALKPHOS 105 85 83 83  < > 88   BILITOTAL 1.1 2.0* 2.7* 2.5*  < > 1.8*   ALBUMIN 3.5 3.0* 3.0* 3.2*  < > 2.8*   INR 1.03 1.16*  --  1.42*  --  1.36*   < > = values in this interval not displayed.  Pancreatic  Enzymes     Recent Labs  Lab 06/14/18  1207   AMYLASE 62      Coagulation Profile     Recent Labs  Lab 06/17/18  0433 06/16/18  0342 06/15/18  1541 06/15/18  0017   INR 1.03 1.16* 1.42* 1.36*   PTT 30 31 33 38*      Lactate  Invalid input(s): LACTATE     5. RADIOLOGY:   Recent Results (from the past 24 hour(s))   XR Chest Port 1 View     Narrative     Exam: XR CHEST PORT 1 VW, 6/17/2018 3:31 AM     Indication: interval eval;      Comparison: Radiograph on 6/16/2018     Findings:   Single supine frontal view of chest.  Interval removal of endotracheal tube, [place]-Jax catheter, enteric  tubes. Stable right IJ dual-lumen catheter, mediastinal drains, left  IJ sheath, left side upper extremity PICC line, mediastinal drain and  right chest tube.     Stable cardiomediastinal silhouette. Persistent low lung volumes. No  pneumothorax. Unchanged streaky retrocardiac opacities.     Impression     Impression:   1. Interval removal of endotracheal tube, [place]-Jax catheter, enteric  tubes. Otherwise, stable support devices.  2. Unchanged streaky retrocardiac opacities, atelectasis versus  consolidation.  3. Stable mild cardiomegaly and pulmonary edema.     I have personally reviewed the examination and initial interpretation  and I agree with the findings.     PATO PAULSON MD         =========================================

## 2018-06-17 NOTE — PROGRESS NOTES
Cardiology Heart Failure Progress Note    Assessment & Plan   63 year old male with a PMH of CAD, ICM (EF of 15-20%), ESRD, bicuspid aortic valve with AI, severe MR, and proximal AAA who was admitted for decompensated heart failure. He underwent OHT on 7/15/2018.       - Hemodynamic support                        - isoproterenol off -> transitioned to terbutaline 5mg q8h VVI epicardial pacing (lower rate limit at 80)                        - Per Surgery team, to have SBP <130 due to visualized aortic aneurysm       - Immunosuppression                        - Mycophenolate 1500 mg bid (transition to PO 6/16), methylprednisolone -> prednisone 50 mg bid from 6/16- with continued taper                        - starting Tacro, now 1.5mg bid(6/16) ->1 mg bid(6/7): daily tacro levels     - Infection prophylaxis-  CMV/ recepient - / donor +,   EBV + / pending    High risk Donor - will need to check infection HIV, hepatitis panel and so forth in 1-2 months                        - Nystatin daily                        - Trimethoprim-sulfamethoxazole renally dosed                                                  - valganciclovir x2/week as HD adjusted dosage     - CAV prophylaxis                        - Hold aspirin/rosuvastatin until improvement in systemic condition       Tunde Coffey   Cardiology fellow    Interval History     No acute events  HR in the 90's with terbutaline    Physical Exam   Temp: 97.9  F (36.6  C) Temp src: Oral BP: 135/86   Heart Rate: 85 Resp: 18 SpO2: 98 % O2 Device: None (Room air) Oxygen Delivery: 2 LPM  Vitals:    06/14/18 1040 06/16/18 1400 06/17/18 1000   Weight: 78 kg (171 lb 15.3 oz) 69.4 kg (153 lb) 75.1 kg (165 lb 9.1 oz)     Vital Signs with Ranges  Temp:  [97.8  F (36.6  C)-98.3  F (36.8  C)] 97.9  F (36.6  C)  Heart Rate:  [85-98] 85  Resp:  [18-20] 18  BP: (114-171)/() 135/86  MAP:  [99 mmHg-119 mmHg] 104 mmHg  Arterial Line BP: (129-172)/(75-90) 143/76  SpO2:  [93 %-100 %] 98  "%  I/O last 3 completed shifts:  In: 1222.09 [P.O.:430; I.V.:472.09; NG/GT:60]  Out: 4444 [Other:4304; Chest Tube:140]    Heart Rate: 85, Blood pressure 135/86, pulse 107, temperature 97.9  F (36.6  C), temperature source Oral, resp. rate 18, height 1.727 m (5' 8\"), weight 75.1 kg (165 lb 9.1 oz), SpO2 98 %.  165 lbs 9.05 oz  GEN:  NAD, eating breakfast   CV: tachycardic  LUNGS:  Lungs with decreased breath sounds  ABD:  Active bowel sounds, soft, non-tender/non-distended.  No rebound/guarding/rigidity.  EXT:  No edema or cyanosis.      Medications     IV fluid REPLACEMENT ONLY       IV fluid REPLACEMENT ONLY       dextrose 5% and 0.45% NaCl 10 mL/hr at 06/15/18 0900     Reason beta blocker order not selected         acetaminophen  975 mg Oral Q8H     glipiZIDE  5 mg Oral Daily with breakfast     heparin  5,000 Units Subcutaneous Q8H     insulin aspart  1-10 Units Subcutaneous TID AC     insulin aspart  1-7 Units Subcutaneous At Bedtime     mycophenolate  1,500 mg Oral BID IS     NEPHROCAPS  1 capsule Oral Daily     nystatin  1,000,000 Units Swish & Swallow 4x Daily     pantoprazole  40 mg Oral QAM     pravastatin  40 mg Oral Daily     predniSONE  40 mg Oral BID    Followed by     [START ON 6/18/2018] predniSONE  35 mg Oral BID    Followed by     [START ON 6/20/2018] predniSONE  30 mg Oral BID    Followed by     [START ON 6/22/2018] predniSONE  25 mg Oral BID    Followed by     [START ON 6/24/2018] predniSONE  20 mg Oral BID     senna-docusate  1 tablet Oral BID    Or     senna-docusate  2 tablet Oral BID     sodium chloride (PF)  3 mL Intracatheter Q8H     sulfamethoxazole-trimethoprim  1 half-tab Oral QPM     tacrolimus  1 mg Oral BID IS     terbutaline  5 mg Oral or Feeding Tube Q8H     valGANciclovir  450 mg Oral or Feeding Tube Every Other Day       Data     Recent Labs  Lab 06/17/18  0433 06/16/18  1850 06/16/18  1043 06/16/18  0342  06/15/18  1541   WBC 14.7* 15.8* 13.3* 12.3*  < > 10.8   HGB 9.2* 9.4* 8.7* " 8.3*  < > 8.7*   MCV 90 90 90 90  < > 90   * 130* 125* 145*  < > 135*   INR 1.03  --   --  1.16*  --  1.42*   *  --  135 134  < > 136   POTASSIUM 4.5  --  5.0 4.9  < > 5.0   CHLORIDE 94  --  98 100  < > 100   CO2 24  --  24 23  < > 23   BUN 22  --  22 19  < > 20   CR 2.42*  --  2.82* 2.64*  < > 3.14*   ANIONGAP 12  --  13 10  < > 13   TRINI 9.6  --  9.3 8.9  < > 9.4   *  --  154* 154*  < > 155*   ALBUMIN 3.5  --   --  3.0*  < > 3.2*   PROTTOTAL 7.2  --   --  6.2*  < > 6.3*   BILITOTAL 1.1  --   --  2.0*  < > 2.5*   ALKPHOS 105  --   --  85  < > 83   ALT 33  --   --  30  < > 26   AST 92*  --   --  111*  < > 105*   < > = values in this interval not displayed.    Recent Results (from the past 24 hour(s))   XR Chest Port 1 View    Narrative    Exam: XR CHEST PORT 1 VW, 6/17/2018 3:31 AM    Indication: interval eval;     Comparison: Radiograph on 6/16/2018    Findings:   Single supine frontal view of chest.  Interval removal of endotracheal tube, Hastings-Jax catheter, enteric  tubes. Stable right IJ dual-lumen catheter, mediastinal drains, left  IJ sheath, left side upper extremity PICC line, mediastinal drain and  right chest tube.    Stable cardiomediastinal silhouette. Persistent low lung volumes. No  pneumothorax. Unchanged streaky retrocardiac opacities.      Impression    Impression:   1. Interval removal of endotracheal tube, Hastings-Jax catheter, enteric  tubes. Otherwise, stable support devices.  2. Unchanged streaky retrocardiac opacities, atelectasis versus  consolidation.  3. Stable mild cardiomegaly and pulmonary edema.    I have personally reviewed the examination and initial interpretation  and I agree with the findings.    PATO PAULSON MD     I personally provided care for this patient, reviewed chart, discussed course with patient, housestaff and consulting physicians.  I answered all questions.    Rhett Austin M.D.  Division of Cardiology  Department of Medicine

## 2018-06-17 NOTE — PROGRESS NOTES
Nephrology attending    S: Doing well. Denies nausea, vomiting, anorexia, SOB. Didn't sleep well last night. Minimal pain reported on 4pt ROS. 4L UF on dialysis yesterday.    O:   97.9   P95   R18   161/99   154/88   97% on RA  Gen - nad  HENT - neck supple  CV - rrr  Resp - cta bilaterally  Ext - no edema    Labs and medications reviewed    A/Rec: 62yo M with s/p heart transplant 6/14/18.  ESRD/volume - 4L UF 6/16. Hemodynamically stable. Nearly euvolemic. No acute indication for RRT. Anticipate next HD 6/18.   - follow up with transplant nephrology as outpatient    Electrolytes - acceptable.    Anemia - continue venofer once a week and epo 4000u qHD    Brice Caraballo MD  [phone]

## 2018-06-17 NOTE — PLAN OF CARE
Problem: Patient Care Overview  Goal: Plan of Care/Patient Progress Review  OT/4E - Discharge Planner OT   Patient plan for discharge: pt is unsure at this time  Current status:  SBA for STS from bedside chair with good compliance to post surgical precautions. Facilitated functional mobility ~500 feet while pushing W/C and CGA-SBA working to increase activity tolerance. Pt requires 2 standing rest breaks due to fatigue. HR 87 bpm, /86 (105), spo2 98% on RA. Notified RN of vitals post ambulation.   Barriers to return to prior living situation: post surgical precautions, activity tolerance  Recommendations for discharge: home with assist and OP CR  Rationale for recommendations: pt will require assist due to post surgical sternal precautions s/p heart transplant and would benefit from OP CR to safely progress activity tolerance       Entered by: Gita Shelley 06/17/2018 2:06 PM

## 2018-06-18 ENCOUNTER — APPOINTMENT (OUTPATIENT)
Dept: OCCUPATIONAL THERAPY | Facility: CLINIC | Age: 63
DRG: 001 | End: 2018-06-18
Attending: INTERNAL MEDICINE
Payer: COMMERCIAL

## 2018-06-18 ENCOUNTER — APPOINTMENT (OUTPATIENT)
Dept: GENERAL RADIOLOGY | Facility: CLINIC | Age: 63
DRG: 001 | End: 2018-06-18
Attending: PHYSICIAN ASSISTANT
Payer: COMMERCIAL

## 2018-06-18 ENCOUNTER — HOSPITAL ENCOUNTER (OUTPATIENT)
Facility: CLINIC | Age: 63
End: 2018-06-18
Attending: INTERNAL MEDICINE | Admitting: INTERNAL MEDICINE
Payer: MEDICARE

## 2018-06-18 ENCOUNTER — HOSPITAL ENCOUNTER (OUTPATIENT)
Facility: CLINIC | Age: 63
DRG: 329 | End: 2018-06-18
Attending: NURSE PRACTITIONER | Admitting: INTERNAL MEDICINE
Payer: MEDICARE

## 2018-06-18 DIAGNOSIS — Z94.1 HEART REPLACED BY TRANSPLANT (H): Primary | ICD-10-CM

## 2018-06-18 LAB
ALBUMIN SERPL-MCNC: 3.3 G/DL (ref 3.4–5)
ALP SERPL-CCNC: 114 U/L (ref 40–150)
ALT SERPL W P-5'-P-CCNC: 34 U/L (ref 0–70)
ANION GAP SERPL CALCULATED.3IONS-SCNC: 14 MMOL/L (ref 3–14)
APTT PPP: 30 SEC (ref 22–37)
AST SERPL W P-5'-P-CCNC: 56 U/L (ref 0–45)
BASOPHILS # BLD AUTO: 0 10E9/L (ref 0–0.2)
BASOPHILS NFR BLD AUTO: 0 %
BILIRUB SERPL-MCNC: 0.8 MG/DL (ref 0.2–1.3)
BUN SERPL-MCNC: 52 MG/DL (ref 7–30)
CA-I BLD-MCNC: 4.5 MG/DL (ref 4.4–5.2)
CALCIUM SERPL-MCNC: 9 MG/DL (ref 8.5–10.1)
CHLORIDE SERPL-SCNC: 94 MMOL/L (ref 94–109)
CO2 SERPL-SCNC: 22 MMOL/L (ref 20–32)
CREAT SERPL-MCNC: 5.01 MG/DL (ref 0.66–1.25)
DIFFERENTIAL METHOD BLD: ABNORMAL
EOSINOPHIL # BLD AUTO: 0 10E9/L (ref 0–0.7)
EOSINOPHIL NFR BLD AUTO: 0 %
ERYTHROCYTE [DISTWIDTH] IN BLOOD BY AUTOMATED COUNT: 17.2 % (ref 10–15)
GFR SERPL CREATININE-BSD FRML MDRD: 12 ML/MIN/1.7M2
GLUCOSE BLDC GLUCOMTR-MCNC: 124 MG/DL (ref 70–99)
GLUCOSE BLDC GLUCOMTR-MCNC: 127 MG/DL (ref 70–99)
GLUCOSE BLDC GLUCOMTR-MCNC: 150 MG/DL (ref 70–99)
GLUCOSE BLDC GLUCOMTR-MCNC: 194 MG/DL (ref 70–99)
GLUCOSE BLDC GLUCOMTR-MCNC: 267 MG/DL (ref 70–99)
GLUCOSE SERPL-MCNC: 113 MG/DL (ref 70–99)
HCT VFR BLD AUTO: 29.7 % (ref 40–53)
HGB BLD-MCNC: 9.5 G/DL (ref 13.3–17.7)
IMM GRANULOCYTES # BLD: 0.1 10E9/L (ref 0–0.4)
IMM GRANULOCYTES NFR BLD: 0.3 %
INR PPP: 1.02 (ref 0.86–1.14)
LYMPHOCYTES # BLD AUTO: 1.1 10E9/L (ref 0.8–5.3)
LYMPHOCYTES NFR BLD AUTO: 7.2 %
MAGNESIUM SERPL-MCNC: 2.2 MG/DL (ref 1.6–2.3)
MCH RBC QN AUTO: 29 PG (ref 26.5–33)
MCHC RBC AUTO-ENTMCNC: 32 G/DL (ref 31.5–36.5)
MCV RBC AUTO: 91 FL (ref 78–100)
MONOCYTES # BLD AUTO: 0.2 10E9/L (ref 0–1.3)
MONOCYTES NFR BLD AUTO: 1.1 %
NEUTROPHILS # BLD AUTO: 13.4 10E9/L (ref 1.6–8.3)
NEUTROPHILS NFR BLD AUTO: 91.4 %
NRBC # BLD AUTO: 0 10*3/UL
NRBC BLD AUTO-RTO: 0 /100
PHOSPHATE SERPL-MCNC: 4 MG/DL (ref 2.5–4.5)
PLATELET # BLD AUTO: 143 10E9/L (ref 150–450)
POTASSIUM SERPL-SCNC: 5 MMOL/L (ref 3.4–5.3)
PROT SERPL-MCNC: 6.9 G/DL (ref 6.8–8.8)
RBC # BLD AUTO: 3.28 10E12/L (ref 4.4–5.9)
SODIUM SERPL-SCNC: 130 MMOL/L (ref 133–144)
TACROLIMUS BLD-MCNC: <3 UG/L (ref 5–15)
TME LAST DOSE: ABNORMAL H
WBC # BLD AUTO: 14.7 10E9/L (ref 4–11)

## 2018-06-18 PROCEDURE — A9270 NON-COVERED ITEM OR SERVICE: HCPCS | Mod: GY | Performed by: STUDENT IN AN ORGANIZED HEALTH CARE EDUCATION/TRAINING PROGRAM

## 2018-06-18 PROCEDURE — 85025 COMPLETE CBC W/AUTO DIFF WBC: CPT | Performed by: THORACIC SURGERY (CARDIOTHORACIC VASCULAR SURGERY)

## 2018-06-18 PROCEDURE — A9270 NON-COVERED ITEM OR SERVICE: HCPCS | Mod: GY | Performed by: THORACIC SURGERY (CARDIOTHORACIC VASCULAR SURGERY)

## 2018-06-18 PROCEDURE — 97535 SELF CARE MNGMENT TRAINING: CPT | Mod: GO

## 2018-06-18 PROCEDURE — 25000125 ZZHC RX 250: Performed by: INTERNAL MEDICINE

## 2018-06-18 PROCEDURE — 85730 THROMBOPLASTIN TIME PARTIAL: CPT | Performed by: THORACIC SURGERY (CARDIOTHORACIC VASCULAR SURGERY)

## 2018-06-18 PROCEDURE — A9270 NON-COVERED ITEM OR SERVICE: HCPCS | Mod: GY | Performed by: PHYSICIAN ASSISTANT

## 2018-06-18 PROCEDURE — 71046 X-RAY EXAM CHEST 2 VIEWS: CPT

## 2018-06-18 PROCEDURE — A9270 NON-COVERED ITEM OR SERVICE: HCPCS | Mod: GY | Performed by: INTERNAL MEDICINE

## 2018-06-18 PROCEDURE — 25000132 ZZH RX MED GY IP 250 OP 250 PS 637: Performed by: STUDENT IN AN ORGANIZED HEALTH CARE EDUCATION/TRAINING PROGRAM

## 2018-06-18 PROCEDURE — 25000132 ZZH RX MED GY IP 250 OP 250 PS 637: Performed by: PHYSICIAN ASSISTANT

## 2018-06-18 PROCEDURE — 80053 COMPREHEN METABOLIC PANEL: CPT | Performed by: THORACIC SURGERY (CARDIOTHORACIC VASCULAR SURGERY)

## 2018-06-18 PROCEDURE — 25000132 ZZH RX MED GY IP 250 OP 250 PS 637: Mod: GY | Performed by: STUDENT IN AN ORGANIZED HEALTH CARE EDUCATION/TRAINING PROGRAM

## 2018-06-18 PROCEDURE — 21400006 ZZH R&B CCU INTERMEDIATE UMMC

## 2018-06-18 PROCEDURE — 84100 ASSAY OF PHOSPHORUS: CPT | Performed by: THORACIC SURGERY (CARDIOTHORACIC VASCULAR SURGERY)

## 2018-06-18 PROCEDURE — 25000132 ZZH RX MED GY IP 250 OP 250 PS 637: Mod: GY | Performed by: THORACIC SURGERY (CARDIOTHORACIC VASCULAR SURGERY)

## 2018-06-18 PROCEDURE — 40000133 ZZH STATISTIC OT WARD VISIT

## 2018-06-18 PROCEDURE — 25000131 ZZH RX MED GY IP 250 OP 636 PS 637: Performed by: STUDENT IN AN ORGANIZED HEALTH CARE EDUCATION/TRAINING PROGRAM

## 2018-06-18 PROCEDURE — 97530 THERAPEUTIC ACTIVITIES: CPT | Mod: GO

## 2018-06-18 PROCEDURE — 83735 ASSAY OF MAGNESIUM: CPT | Performed by: THORACIC SURGERY (CARDIOTHORACIC VASCULAR SURGERY)

## 2018-06-18 PROCEDURE — 25000132 ZZH RX MED GY IP 250 OP 250 PS 637: Mod: GY | Performed by: PHYSICIAN ASSISTANT

## 2018-06-18 PROCEDURE — 97110 THERAPEUTIC EXERCISES: CPT | Mod: GO

## 2018-06-18 PROCEDURE — 85610 PROTHROMBIN TIME: CPT | Performed by: THORACIC SURGERY (CARDIOTHORACIC VASCULAR SURGERY)

## 2018-06-18 PROCEDURE — 74018 RADEX ABDOMEN 1 VIEW: CPT

## 2018-06-18 PROCEDURE — 40000558 ZZH STATISTIC CVC DRESSING CHANGE

## 2018-06-18 PROCEDURE — 25000132 ZZH RX MED GY IP 250 OP 250 PS 637: Performed by: INTERNAL MEDICINE

## 2018-06-18 PROCEDURE — 25000131 ZZH RX MED GY IP 250 OP 636 PS 637: Performed by: INTERNAL MEDICINE

## 2018-06-18 PROCEDURE — 80048 BASIC METABOLIC PNL TOTAL CA: CPT | Performed by: THORACIC SURGERY (CARDIOTHORACIC VASCULAR SURGERY)

## 2018-06-18 PROCEDURE — 82330 ASSAY OF CALCIUM: CPT | Performed by: THORACIC SURGERY (CARDIOTHORACIC VASCULAR SURGERY)

## 2018-06-18 PROCEDURE — 25800025 ZZH RX 258: Performed by: THORACIC SURGERY (CARDIOTHORACIC VASCULAR SURGERY)

## 2018-06-18 PROCEDURE — 25000128 H RX IP 250 OP 636: Performed by: STUDENT IN AN ORGANIZED HEALTH CARE EDUCATION/TRAINING PROGRAM

## 2018-06-18 PROCEDURE — 36592 COLLECT BLOOD FROM PICC: CPT | Performed by: THORACIC SURGERY (CARDIOTHORACIC VASCULAR SURGERY)

## 2018-06-18 PROCEDURE — 80197 ASSAY OF TACROLIMUS: CPT | Performed by: THORACIC SURGERY (CARDIOTHORACIC VASCULAR SURGERY)

## 2018-06-18 PROCEDURE — 00000146 ZZHCL STATISTIC GLUCOSE BY METER IP

## 2018-06-18 RX ORDER — HYDRALAZINE HYDROCHLORIDE 25 MG/1
25 TABLET, FILM COATED ORAL 4 TIMES DAILY
Status: DISCONTINUED | OUTPATIENT
Start: 2018-06-18 | End: 2018-06-21

## 2018-06-18 RX ORDER — ASPIRIN 81 MG/1
81 TABLET ORAL DAILY
Status: DISCONTINUED | OUTPATIENT
Start: 2018-06-18 | End: 2018-07-02 | Stop reason: HOSPADM

## 2018-06-18 RX ORDER — HYDRALAZINE HYDROCHLORIDE 10 MG/1
10 TABLET, FILM COATED ORAL ONCE
Status: COMPLETED | OUTPATIENT
Start: 2018-06-18 | End: 2018-06-18

## 2018-06-18 RX ORDER — HYDRALAZINE HYDROCHLORIDE 10 MG/1
10 TABLET, FILM COATED ORAL 4 TIMES DAILY
Status: DISCONTINUED | OUTPATIENT
Start: 2018-06-18 | End: 2018-06-18

## 2018-06-18 RX ORDER — GLIPIZIDE 2.5 MG/1
2.5 TABLET, EXTENDED RELEASE ORAL
Status: DISCONTINUED | OUTPATIENT
Start: 2018-06-19 | End: 2018-06-22

## 2018-06-18 RX ADMIN — HEPARIN SODIUM 5000 UNITS: 5000 INJECTION, SOLUTION INTRAVENOUS; SUBCUTANEOUS at 20:40

## 2018-06-18 RX ADMIN — MYCOPHENOLATE MOFETIL 1500 MG: 250 CAPSULE ORAL at 08:19

## 2018-06-18 RX ADMIN — Medication 1 HALF-TAB: at 20:46

## 2018-06-18 RX ADMIN — NYSTATIN 1000000 UNITS: 100000 SUSPENSION ORAL at 20:40

## 2018-06-18 RX ADMIN — HYDRALAZINE HYDROCHLORIDE 10 MG: 20 INJECTION INTRAMUSCULAR; INTRAVENOUS at 22:41

## 2018-06-18 RX ADMIN — NYSTATIN 1000000 UNITS: 100000 SUSPENSION ORAL at 08:20

## 2018-06-18 RX ADMIN — ACETAMINOPHEN 650 MG: 325 TABLET, FILM COATED ORAL at 14:21

## 2018-06-18 RX ADMIN — HYDRALAZINE HYDROCHLORIDE 10 MG: 10 TABLET, FILM COATED ORAL at 12:13

## 2018-06-18 RX ADMIN — SENNOSIDES AND DOCUSATE SODIUM 1 TABLET: 8.6; 5 TABLET ORAL at 20:40

## 2018-06-18 RX ADMIN — DEXTROSE AND SODIUM CHLORIDE: 5; 450 INJECTION, SOLUTION INTRAVENOUS at 20:34

## 2018-06-18 RX ADMIN — TERBUTALINE SULFATE 5 MG: 5 TABLET ORAL at 04:35

## 2018-06-18 RX ADMIN — HYDRALAZINE HYDROCHLORIDE 25 MG: 25 TABLET ORAL at 20:40

## 2018-06-18 RX ADMIN — VALGANCICLOVIR HYDROCHLORIDE 450 MG: 50 POWDER, FOR SOLUTION ORAL at 08:23

## 2018-06-18 RX ADMIN — HEPARIN SODIUM 5000 UNITS: 5000 INJECTION, SOLUTION INTRAVENOUS; SUBCUTANEOUS at 12:13

## 2018-06-18 RX ADMIN — HYDRALAZINE HYDROCHLORIDE 10 MG: 20 INJECTION INTRAMUSCULAR; INTRAVENOUS at 14:02

## 2018-06-18 RX ADMIN — HYDRALAZINE HYDROCHLORIDE 10 MG: 20 INJECTION INTRAMUSCULAR; INTRAVENOUS at 04:41

## 2018-06-18 RX ADMIN — HYDRALAZINE HYDROCHLORIDE 25 MG: 25 TABLET ORAL at 15:53

## 2018-06-18 RX ADMIN — HYDRALAZINE HYDROCHLORIDE 25 MG: 25 TABLET ORAL at 12:13

## 2018-06-18 RX ADMIN — HEPARIN SODIUM 5000 UNITS: 5000 INJECTION, SOLUTION INTRAVENOUS; SUBCUTANEOUS at 04:35

## 2018-06-18 RX ADMIN — Medication 1 MG: at 18:02

## 2018-06-18 RX ADMIN — NYSTATIN 1000000 UNITS: 100000 SUSPENSION ORAL at 12:12

## 2018-06-18 RX ADMIN — PREDNISONE 35 MG: 5 TABLET ORAL at 08:20

## 2018-06-18 RX ADMIN — HYDRALAZINE HYDROCHLORIDE 10 MG: 10 TABLET, FILM COATED ORAL at 08:28

## 2018-06-18 RX ADMIN — NYSTATIN 1000000 UNITS: 100000 SUSPENSION ORAL at 15:53

## 2018-06-18 RX ADMIN — Medication 1 CAPSULE: at 08:21

## 2018-06-18 RX ADMIN — TERBUTALINE SULFATE 5 MG: 5 TABLET ORAL at 12:13

## 2018-06-18 RX ADMIN — Medication 1 MG: at 08:19

## 2018-06-18 RX ADMIN — ASPIRIN 81 MG: 81 TABLET, COATED ORAL at 12:12

## 2018-06-18 RX ADMIN — PRAVASTATIN SODIUM 40 MG: 40 TABLET ORAL at 08:20

## 2018-06-18 RX ADMIN — MYCOPHENOLATE MOFETIL 1500 MG: 250 CAPSULE ORAL at 18:02

## 2018-06-18 RX ADMIN — SENNOSIDES AND DOCUSATE SODIUM 1 TABLET: 8.6; 5 TABLET ORAL at 08:21

## 2018-06-18 RX ADMIN — PREDNISONE 35 MG: 5 TABLET ORAL at 20:39

## 2018-06-18 RX ADMIN — TERBUTALINE SULFATE 5 MG: 5 TABLET ORAL at 20:39

## 2018-06-18 RX ADMIN — GLIPIZIDE 5 MG: 5 TABLET, FILM COATED, EXTENDED RELEASE ORAL at 09:36

## 2018-06-18 RX ADMIN — PANTOPRAZOLE SODIUM 40 MG: 40 TABLET, DELAYED RELEASE ORAL at 08:21

## 2018-06-18 ASSESSMENT — PAIN DESCRIPTION - DESCRIPTORS
DESCRIPTORS: ACHING;DISCOMFORT
DESCRIPTORS: ACHING
DESCRIPTORS: ACHING
DESCRIPTORS: ACHING;DISCOMFORT

## 2018-06-18 NOTE — PLAN OF CARE
Problem: Patient Care Overview  Goal: Plan of Care/Patient Progress Review  RN:  1.Pt will remain hemodynamically stable.  2.BG WNL.   Outcome: Improving  D/A/I:  Patient POD 3 after heart transplant, A&O x4.  Denied palpitations, difficulty breathing, SOB, dizziness, and nausea.  Pleural rub auscultated in right middle lung lobe, lung sounds diminished in left lung base.  Chest tube draining serosanguinous fluid, no air leak noted.  Midsternal incision clean and dry, no drainage noted.  Pacer wires capped, dressing clean and dry.  In sinus rhythm with HR 80s-90s, SBP 160s-170s, SaO2 96-99% on room air.  Scheduled hydralazine started, SBP decreased to 140s.  Reported a headache that was managed with prn acetaminophen.  Had two chest tubes removed during shift; dressings clean, dry, and intact.  Chest and abdominal X-ray performed during shift, see Chart Review for results.  Ambulated in hallway with standby assist and use of walker, was steady on his feet.    P:  Continue to monitor pain, VS, heart rhythm, chest tube and surgical site integrity, fluid status, cardiac and respiratory status.  Notify care team of changes in patient condition or other concerns.  Expected to have dialysis tomorrow around 0730.  Expected to have right heart cath later in the week.

## 2018-06-18 NOTE — PLAN OF CARE
Problem: Patient Care Overview  Goal: Plan of Care/Patient Progress Review  RN:  1.Pt will remain hemodynamically stable.  2.BG WNL.  Outcome: Improving  's/90's.  Prn hydralazine given X2 this shift with recheck 130-140's/70-90's.  SR 80's most of night.  MD notified per ordered parameters.  No new orders.  Pt continues on terbutaline.  Afebrile.  Denies discomfort.  Scant drainage from CT.  Strong productive cough.  O2 sat 98% on RA.  BG 56 last evening(MD aware).  No prior ss or long acting insulin administered. Pt on glipizide Q am.  Asymptomatic.  Sitting up in chair, conversing with staff, watching television. Good appetite.  Had meal, apple juice.  Recheck BG .  Recheck overnight 120-150's.  Incisions/dressings CDI.  Pacer wires capped. Ambulating without difficulty.  Stable, pleasant, without distress.  Slept well.  Continue to monitor s/p heart transplant 7/15/18.  Follow BP, BG.  Transplant teaching per protocol.  Notify MD w/ further issues/concerns.

## 2018-06-18 NOTE — PLAN OF CARE
Problem: Patient Care Overview  Goal: Plan of Care/Patient Progress Review  RN:  1.Pt will remain hemodynamically stable.  2.BG WNL.   OT6C  Discharge Planner OT   Patient plan for discharge: Home OP CR   Current status: Pt completed g/h activities standing at sink SBA and set up assistance. Pt completed seated UE calisthenics within safe and pain free ROM. Pt ambulated ~300' SBA pushing w/c in hallway, needing x3 standing rest breaks. VSS on RA throughout session .   Barriers to return to prior living situation: medical status, stairs, decreased strength, endurance, and poor activity tolerance   Recommendations for discharge: Home with assistance and OP CR   Rationale for recommendations: Pt would benefit from assistance at home for heavy I/ADLs and OP CR to progress return to PLOF        Entered by: Ashlie Puente 06/18/2018 4:11 PM

## 2018-06-18 NOTE — PROGRESS NOTES
CVTS Daily Note  6/18/2018  Attending: Rony Caputo,*    S:   No overnight events. Elevated BPs with PRN hydralazine used.     Pt seen at bedside resting comfortably.    No acute complaints.      Denies F/C/Sweats.  No CP, SOB, or calf pain.    Tolerating diet without tube feeds.  + BM.  + Flatus.    Ambulated well without assistance.    Pain level tolerable. Plan as per Neuro section below.     O:   Vitals:    06/18/18 0515 06/18/18 0530 06/18/18 0723 06/18/18 0751   BP: (!) 156/94 132/78 (!) 168/97 (!) 165/96   BP Location: Right arm Right arm Right arm    Cuff Size:       Pulse:       Resp:   18    Temp:   98.1  F (36.7  C)    TempSrc:   Oral    SpO2:   99%    Weight:  76.9 kg (169 lb 9.6 oz)     Height:         Vitals:    06/16/18 1400 06/17/18 1000 06/18/18 0530   Weight: 69.4 kg (153 lb) 75.1 kg (165 lb 9.1 oz) 76.9 kg (169 lb 9.6 oz)     + 1 kg since admit and trending stable      Intake/Output Summary (Last 24 hours) at 06/18/18 0924  Last data filed at 06/18/18 0826   Gross per 24 hour   Intake              980 ml   Output              140 ml   Net              840 ml       MAPs: 97 - 109  Gen: AAO x 3, pleasant, NAD  CV: RRR, S1S2 normal, no murmurs, rubs, or gallops. TPW capped.   Pulm: CTA, no rhonchi or wheezes  Abd: soft, non-tender, no guarding  Ext: trace peripheral edema, 1 + pitting  Incision: clean, dry, intact, no erythema  Chest Tube sites: dressings clean and dry, serosanguinous, no air leak, output 140 cc     * removed mediastinal tubes without immediate complication       Labs:  [facility]    Recent Labs  Lab 06/18/18  0827 06/17/18  1630 06/17/18  0433 06/16/18  1043   * 130* 131* 135   POTASSIUM 5.0 4.6 4.5 5.0   CHLORIDE 94 94 94 98   TRINI 9.0 9.6 9.6 9.3   CO2 22 23 24 24   BUN 52* 34* 22 22   CR 5.01* 3.48* 2.42* 2.82*   * 168* 172* 154*     CBC    Recent Labs  Lab 06/17/18  0433 06/16/18  1850 06/16/18  1043 06/16/18  0342   WBC 14.7* 15.8* 13.3* 12.3*   RBC 3.19* 3.22*  2.97* 2.88*   HGB 9.2* 9.4* 8.7* 8.3*   HCT 28.7* 29.1* 26.7* 25.8*   MCV 90 90 90 90   MCH 28.8 29.2 29.3 28.8   MCHC 32.1 32.3 32.6 32.2   RDW 17.5* 17.5* 17.8* 17.9*   * 130* 125* 145*     INR    Recent Labs  Lab 06/18/18  0827 06/17/18  0433 06/16/18  0342 06/15/18  1541   INR 1.02 1.03 1.16* 1.42*      Hepatic Panel   Lab Results   Component Value Date    AST 56 06/18/2018     Lab Results   Component Value Date    ALT 34 06/18/2018     Lab Results   Component Value Date    ALBUMIN 3.3 06/18/2018     GLUCOSE:     Recent Labs  Lab 06/18/18  0827 06/18/18  0726 06/18/18  0433 06/18/18  0131 06/17/18  2333 06/17/18  2257 06/17/18  2232  06/17/18  1630  06/17/18  0433  06/16/18  1043  06/16/18  0342  06/15/18  2200   *  --   --   --   --   --   --   --  168*  --  172*  --  154*  --  154*  --  116*   BGM  --  124* 150* 127* 93 100* 66*  < >  --   < >  --   < >  --   < >  --   < >  --    < > = values in this interval not displayed.      Imaging:  reviewed recent imaging      A/P:    Murray Nicholson is a 63 year old male CAD, ICM (EF of 15-20%), ESRD on HD, bicuspid aortic valve with AI, severe MR, and proximal AAA who was admitted for decompensated heart failure 4/16/18 now s/p heart transplant on 6/14/18. Extubated uneventfully, weaned off of the nitric oxide easily.    Neuro:   - Neuro intact  - Scheduled Tylenol and PRN oxycodone   - Leg restlessness complaints    CV:   - No arrhythmia, HD stable.  - ASA 81 mg  - HTN; Hydralazine 25 mg q 6 hrs (patient concerned this is causing leg restlessness)   - Keep TPW capped for now      Pulm:   - Pulm toilet, IS, activity and deep breathing   - R pleural to waterseal 6/18     ID:   - WBC 14.7, afebrile, no signs or symptoms of infection, on steroids  - Immunosuppression per Cardiology     GI / FEN:  - 2 gr Na Reg diet, bowel regimen     Renal / :   - Creatinine 5.01, adequate UOP.   - Nephrology following, Dialysis TTS    Heme:   - Hgb 8-9's, Plts 139     Endo:    - Glipizide 2.5 mg q AM (no renal dosing needed)   - Med sliding scale insulin     PPX:   - Protonix    Anticoagulation:   - Hep 5000 U q 8 hrs for DVT prophylaxis     Dispo:   - 6C since 6/17  - Needs RHC/Bx and wire removal       Discussed with CVTS Fellow   Staff surgeons have been informed of changes through both  verbal and written communication.      Anmol Deal PA-C  Cardiothoracic Surgery  Pager [phone]    9:24 AM   June 18, 2018

## 2018-06-18 NOTE — PROGRESS NOTES
"Nephrology Progress Note  06/18/2018         Murray Nicholson is a 63 year old year old male with PMH of HTN, DMII, functionally bicuspid aortic valve, CHF/CM (EF 10-15%), ESRD, admitted with worsening dyspnea/SOB, now S/P heart tx 6/14 and started on CRRT, Nephrology managing HD/CRRT.     Interval History  Mr Nicholson is recovering from heart tx 6/14, doing well as he is extubated and on RA, on med/surg floor having chest tubes removed.  No indication to run today, will keep on TTS schedule and run tomorrow.  Plan to have pt follow up in transplant nephrology clinic on discharge.       Assessment & Recommendations:   ESRD: due to DM, on PD since Aug 2017, changed to HD 1/18, now TTS, at [facility] under care Dr Mcpherson, [facility] tunnel, EDW 75.5-76 kg, 3.5 hrs.    -No indication for HD today, plan for iHD tomorrow, minimal intake so UF goals should be reasonable.        Volume-EDW 76 kg.  Essentially at EDW at 76.9kg today, on RA and breathing comfortably.  Will plan to run tomorrow, will UF to EDW.        BMD- Ca 9.0, alb 3.3, phos 4.0, no acute issues.      Heart Tx-Still on inotropes and chronotropic gtt's post surgery, started on mycophenolate and methylpred.     Anemia-Cont venofer 100 mg Qtues, cont Epo 4000 u with dialysis once changed to iHD.       Nutrition-Deferred for today, was taking PO prior.       Discussed with Dr Tena     Recommendations were communicated to team via verbal communication.        Moris Sevilla  Clinical Nurse Specialist  [phone]    Review of Systems:   I reviewed the following systems:  ROS not done due to vent/sedation.     Physical Exam:   I/O last 3 completed shifts:  In: 1346.97 [P.O.:1320; I.V.:26.97]  Out: 110 [Chest Tube:110]   BP (!) 174/101 (BP Location: Right arm)  Pulse 107  Temp 97.9  F (36.6  C) (Oral)  Resp 18  Ht 1.727 m (5' 8\")  Wt 76.9 kg (169 lb 9.6 oz)  SpO2 96%  BMI 25.79 kg/m2     GENERAL APPEARANCE: Lying in bed, in no distress  EYES:  No scleral " icterus, pupils equal  HENT: mouth without ulcers or lesions  PULM: lungs clear to auscultation, equal air movement, no cyanosis or clubbing  CV: regular rhythm, normal rate, no rub     -JVP not elevated     -edema trace LE.  GI: soft, non-tender, not distended, bowel sounds are +  MS: no evidence of inflammation in joints, no muscle tenderness  NEURO: Alert and oriented  Lines-tunneled HD line    Labs:   All labs reviewed by me  Electrolytes/Renal -   Recent Labs   Lab Test  06/18/18   0827  06/17/18   1630  06/17/18   0433   NA  130*  130*  131*   POTASSIUM  5.0  4.6  4.5   CHLORIDE  94  94  94   CO2  22  23  24   BUN  52*  34*  22   CR  5.01*  3.48*  2.42*   GLC  113*  168*  172*   TRINI  9.0  9.6  9.6   MAG  2.2  2.2  2.0   PHOS  4.0  4.3  4.3       CBC -   Recent Labs   Lab Test  06/18/18   0827  06/17/18   0433  06/16/18   1850   WBC  14.7*  14.7*  15.8*   HGB  9.5*  9.2*  9.4*   PLT  143*  139*  130*       LFTs -   Recent Labs   Lab Test  06/18/18   0827  06/17/18   0433  06/16/18   0342   ALKPHOS  114  105  85   BILITOTAL  0.8  1.1  2.0*   ALT  34  33  30   AST  56*  92*  111*   PROTTOTAL  6.9  7.2  6.2*   ALBUMIN  3.3*  3.5  3.0*       Iron Panel -   Recent Labs   Lab Test  05/08/18   0954  07/19/17   1306  07/05/17   1204   IRON  58  46  26*   IRONSAT  27  18  12*   ALBERTINA  621*  369  542*           Current Medications:    aspirin  81 mg Oral Daily     [START ON 6/19/2018] glipiZIDE  2.5 mg Oral Daily with breakfast     heparin  5,000 Units Subcutaneous Q8H     hydrALAZINE  25 mg Oral 4x Daily     insulin aspart  1-7 Units Subcutaneous TID AC     insulin aspart  1-5 Units Subcutaneous At Bedtime     mycophenolate  1,500 mg Oral BID IS     NEPHROCAPS  1 capsule Oral Daily     nystatin  1,000,000 Units Swish & Swallow 4x Daily     pantoprazole  40 mg Oral QAM     pravastatin  40 mg Oral Daily     predniSONE  35 mg Oral BID    Followed by     [START ON 6/20/2018] predniSONE  30 mg Oral BID    Followed by      [START ON 6/22/2018] predniSONE  25 mg Oral BID    Followed by     [START ON 6/24/2018] predniSONE  20 mg Oral BID     senna-docusate  1 tablet Oral BID     sodium chloride (PF)  3 mL Intracatheter Q8H     sulfamethoxazole-trimethoprim  1 half-tab Oral QPM     tacrolimus  1 mg Oral BID IS     terbutaline  5 mg Oral or Feeding Tube Q8H     valGANciclovir  450 mg Oral or Feeding Tube Once per day on Mon Thu       IV fluid REPLACEMENT ONLY       IV fluid REPLACEMENT ONLY       dextrose 5% and 0.45% NaCl 10 mL/hr at 06/15/18 0900     Reason beta blocker order not selected

## 2018-06-18 NOTE — PLAN OF CARE
Problem: Patient Care Overview  Goal: Plan of Care/Patient Progress Review  PT 4E: Defer- Per discussion with OT patient is mobilizing well post heart txp, anticipate pt will only require one therapy discipline in order to progress functional mobility and aerobic endurance, OT is following for Cardiac Rehab. PT will complete orders. Please re-consult if new needs arise.

## 2018-06-19 ENCOUNTER — APPOINTMENT (OUTPATIENT)
Dept: GENERAL RADIOLOGY | Facility: CLINIC | Age: 63
DRG: 001 | End: 2018-06-19
Attending: THORACIC SURGERY (CARDIOTHORACIC VASCULAR SURGERY)
Payer: COMMERCIAL

## 2018-06-19 ENCOUNTER — HOSPITAL ENCOUNTER (OUTPATIENT)
Facility: CLINIC | Age: 63
End: 2018-06-19
Attending: INTERNAL MEDICINE | Admitting: INTERNAL MEDICINE
Payer: MEDICARE

## 2018-06-19 LAB
ANION GAP SERPL CALCULATED.3IONS-SCNC: 16 MMOL/L (ref 3–14)
APTT PPP: 23 SEC (ref 22–37)
BACTERIA SPEC CULT: NO GROWTH
BUN SERPL-MCNC: 68 MG/DL (ref 7–30)
CA-I BLD-MCNC: 3.9 MG/DL (ref 4.4–5.2)
CALCIUM SERPL-MCNC: 9 MG/DL (ref 8.5–10.1)
CHLORIDE SERPL-SCNC: 94 MMOL/L (ref 94–109)
CO2 SERPL-SCNC: 18 MMOL/L (ref 20–32)
CREAT SERPL-MCNC: 6.5 MG/DL (ref 0.66–1.25)
DONOR IDENTIFICATION: NORMAL
DSA COMMENTS: NORMAL
DSA PRESENT: NO
DSA TEST METHOD: NORMAL
ERYTHROCYTE [DISTWIDTH] IN BLOOD BY AUTOMATED COUNT: 16.9 % (ref 10–15)
GFR SERPL CREATININE-BSD FRML MDRD: 9 ML/MIN/1.7M2
GLUCOSE BLDC GLUCOMTR-MCNC: 161 MG/DL (ref 70–99)
GLUCOSE BLDC GLUCOMTR-MCNC: 172 MG/DL (ref 70–99)
GLUCOSE BLDC GLUCOMTR-MCNC: 178 MG/DL (ref 70–99)
GLUCOSE BLDC GLUCOMTR-MCNC: 192 MG/DL (ref 70–99)
GLUCOSE BLDC GLUCOMTR-MCNC: 201 MG/DL (ref 70–99)
GLUCOSE BLDC GLUCOMTR-MCNC: 235 MG/DL (ref 70–99)
GLUCOSE BLDC GLUCOMTR-MCNC: 302 MG/DL (ref 70–99)
GLUCOSE BLDC GLUCOMTR-MCNC: 304 MG/DL (ref 70–99)
GLUCOSE SERPL-MCNC: 170 MG/DL (ref 70–99)
HCT VFR BLD AUTO: 28.6 % (ref 40–53)
HGB BLD-MCNC: 9.5 G/DL (ref 13.3–17.7)
INR PPP: 1.01 (ref 0.86–1.14)
MAGNESIUM SERPL-MCNC: 2.2 MG/DL (ref 1.6–2.3)
MCH RBC QN AUTO: 29.5 PG (ref 26.5–33)
MCHC RBC AUTO-ENTMCNC: 33.2 G/DL (ref 31.5–36.5)
MCV RBC AUTO: 89 FL (ref 78–100)
ORGAN: NORMAL
PHOSPHATE SERPL-MCNC: 5.2 MG/DL (ref 2.5–4.5)
PLATELET # BLD AUTO: 159 10E9/L (ref 150–450)
POTASSIUM SERPL-SCNC: 5.1 MMOL/L (ref 3.4–5.3)
RBC # BLD AUTO: 3.22 10E12/L (ref 4.4–5.9)
SA1 CELL: NORMAL
SA1 COMMENTS: NORMAL
SA1 HI RISK ABY: NORMAL
SA1 MOD RISK ABY: NORMAL
SA1 TEST METHOD: NORMAL
SA2 CELL: NORMAL
SA2 COMMENTS: NORMAL
SA2 HI RISK ABY UA: NORMAL
SA2 MOD RISK ABY: NORMAL
SA2 TEST METHOD: NORMAL
SODIUM SERPL-SCNC: 128 MMOL/L (ref 133–144)
SPECIMEN SOURCE: NORMAL
WBC # BLD AUTO: 13.3 10E9/L (ref 4–11)

## 2018-06-19 PROCEDURE — 94799 UNLISTED PULMONARY SVC/PX: CPT

## 2018-06-19 PROCEDURE — 25000125 ZZHC RX 250: Performed by: INTERNAL MEDICINE

## 2018-06-19 PROCEDURE — 25000132 ZZH RX MED GY IP 250 OP 250 PS 637: Mod: GY | Performed by: PHYSICIAN ASSISTANT

## 2018-06-19 PROCEDURE — 25000132 ZZH RX MED GY IP 250 OP 250 PS 637: Mod: GY | Performed by: STUDENT IN AN ORGANIZED HEALTH CARE EDUCATION/TRAINING PROGRAM

## 2018-06-19 PROCEDURE — A9270 NON-COVERED ITEM OR SERVICE: HCPCS | Mod: GY | Performed by: PHYSICIAN ASSISTANT

## 2018-06-19 PROCEDURE — 00000146 ZZHCL STATISTIC GLUCOSE BY METER IP

## 2018-06-19 PROCEDURE — 80048 BASIC METABOLIC PNL TOTAL CA: CPT | Performed by: PHYSICIAN ASSISTANT

## 2018-06-19 PROCEDURE — 74019 RADEX ABDOMEN 2 VIEWS: CPT

## 2018-06-19 PROCEDURE — 25000131 ZZH RX MED GY IP 250 OP 636 PS 637: Performed by: STUDENT IN AN ORGANIZED HEALTH CARE EDUCATION/TRAINING PROGRAM

## 2018-06-19 PROCEDURE — 63400005 ZZH RX 634: Performed by: CLINICAL NURSE SPECIALIST

## 2018-06-19 PROCEDURE — 25000128 H RX IP 250 OP 636: Performed by: INTERNAL MEDICINE

## 2018-06-19 PROCEDURE — A9270 NON-COVERED ITEM OR SERVICE: HCPCS | Mod: GY | Performed by: THORACIC SURGERY (CARDIOTHORACIC VASCULAR SURGERY)

## 2018-06-19 PROCEDURE — 82330 ASSAY OF CALCIUM: CPT | Performed by: PHYSICIAN ASSISTANT

## 2018-06-19 PROCEDURE — 85610 PROTHROMBIN TIME: CPT | Performed by: THORACIC SURGERY (CARDIOTHORACIC VASCULAR SURGERY)

## 2018-06-19 PROCEDURE — 25000128 H RX IP 250 OP 636: Performed by: STUDENT IN AN ORGANIZED HEALTH CARE EDUCATION/TRAINING PROGRAM

## 2018-06-19 PROCEDURE — 40000133 ZZH STATISTIC OT WARD VISIT: Performed by: OCCUPATIONAL THERAPIST

## 2018-06-19 PROCEDURE — 97535 SELF CARE MNGMENT TRAINING: CPT | Mod: GO | Performed by: OCCUPATIONAL THERAPIST

## 2018-06-19 PROCEDURE — 90937 HEMODIALYSIS REPEATED EVAL: CPT

## 2018-06-19 PROCEDURE — 21400006 ZZH R&B CCU INTERMEDIATE UMMC

## 2018-06-19 PROCEDURE — 25000132 ZZH RX MED GY IP 250 OP 250 PS 637: Mod: GY | Performed by: THORACIC SURGERY (CARDIOTHORACIC VASCULAR SURGERY)

## 2018-06-19 PROCEDURE — 25000132 ZZH RX MED GY IP 250 OP 250 PS 637: Performed by: THORACIC SURGERY (CARDIOTHORACIC VASCULAR SURGERY)

## 2018-06-19 PROCEDURE — 85730 THROMBOPLASTIN TIME PARTIAL: CPT | Performed by: THORACIC SURGERY (CARDIOTHORACIC VASCULAR SURGERY)

## 2018-06-19 PROCEDURE — 97110 THERAPEUTIC EXERCISES: CPT | Mod: GO | Performed by: OCCUPATIONAL THERAPIST

## 2018-06-19 PROCEDURE — 84100 ASSAY OF PHOSPHORUS: CPT | Performed by: PHYSICIAN ASSISTANT

## 2018-06-19 PROCEDURE — 36592 COLLECT BLOOD FROM PICC: CPT | Performed by: THORACIC SURGERY (CARDIOTHORACIC VASCULAR SURGERY)

## 2018-06-19 PROCEDURE — A9270 NON-COVERED ITEM OR SERVICE: HCPCS | Mod: GY | Performed by: STUDENT IN AN ORGANIZED HEALTH CARE EDUCATION/TRAINING PROGRAM

## 2018-06-19 PROCEDURE — 85027 COMPLETE CBC AUTOMATED: CPT | Performed by: PHYSICIAN ASSISTANT

## 2018-06-19 PROCEDURE — 83735 ASSAY OF MAGNESIUM: CPT | Performed by: PHYSICIAN ASSISTANT

## 2018-06-19 PROCEDURE — 71046 X-RAY EXAM CHEST 2 VIEWS: CPT

## 2018-06-19 PROCEDURE — 25000131 ZZH RX MED GY IP 250 OP 636 PS 637: Performed by: INTERNAL MEDICINE

## 2018-06-19 PROCEDURE — 25000128 H RX IP 250 OP 636: Performed by: CLINICAL NURSE SPECIALIST

## 2018-06-19 RX ORDER — HEPARIN SODIUM 1000 [USP'U]/ML
500 INJECTION, SOLUTION INTRAVENOUS; SUBCUTANEOUS
Status: COMPLETED | OUTPATIENT
Start: 2018-06-19 | End: 2018-06-19

## 2018-06-19 RX ORDER — HEPARIN SODIUM 1000 [USP'U]/ML
500 INJECTION, SOLUTION INTRAVENOUS; SUBCUTANEOUS CONTINUOUS
Status: DISCONTINUED | OUTPATIENT
Start: 2018-06-19 | End: 2018-06-19

## 2018-06-19 RX ORDER — HEPARIN SODIUM 1000 [USP'U]/ML
3 INJECTION, SOLUTION INTRAVENOUS; SUBCUTANEOUS ONCE
Status: COMPLETED | OUTPATIENT
Start: 2018-06-19 | End: 2018-06-19

## 2018-06-19 RX ADMIN — ASPIRIN 81 MG: 81 TABLET, COATED ORAL at 08:10

## 2018-06-19 RX ADMIN — SENNOSIDES AND DOCUSATE SODIUM 1 TABLET: 8.6; 5 TABLET ORAL at 19:54

## 2018-06-19 RX ADMIN — SODIUM CHLORIDE 250 ML: 9 INJECTION, SOLUTION INTRAVENOUS at 09:18

## 2018-06-19 RX ADMIN — HEPARIN SODIUM 500 UNITS/HR: 1000 INJECTION, SOLUTION INTRAVENOUS; SUBCUTANEOUS at 09:26

## 2018-06-19 RX ADMIN — TERBUTALINE SULFATE 5 MG: 5 TABLET ORAL at 05:11

## 2018-06-19 RX ADMIN — MYCOPHENOLATE MOFETIL 1500 MG: 250 CAPSULE ORAL at 19:01

## 2018-06-19 RX ADMIN — TERBUTALINE SULFATE 5 MG: 5 TABLET ORAL at 20:55

## 2018-06-19 RX ADMIN — TRAMADOL HYDROCHLORIDE 50 MG: 50 TABLET, COATED ORAL at 09:17

## 2018-06-19 RX ADMIN — MYCOPHENOLATE MOFETIL 1500 MG: 250 CAPSULE ORAL at 08:10

## 2018-06-19 RX ADMIN — PRAVASTATIN SODIUM 40 MG: 40 TABLET ORAL at 19:54

## 2018-06-19 RX ADMIN — Medication 1 MG: at 08:10

## 2018-06-19 RX ADMIN — SODIUM CHLORIDE 300 ML: 9 INJECTION, SOLUTION INTRAVENOUS at 09:18

## 2018-06-19 RX ADMIN — HEPARIN SODIUM 5000 UNITS: 5000 INJECTION, SOLUTION INTRAVENOUS; SUBCUTANEOUS at 19:02

## 2018-06-19 RX ADMIN — HEPARIN SODIUM 500 UNITS: 1000 INJECTION, SOLUTION INTRAVENOUS; SUBCUTANEOUS at 12:44

## 2018-06-19 RX ADMIN — HEPARIN SODIUM 5000 UNITS: 5000 INJECTION, SOLUTION INTRAVENOUS; SUBCUTANEOUS at 08:23

## 2018-06-19 RX ADMIN — ACETAMINOPHEN 650 MG: 325 TABLET, FILM COATED ORAL at 02:34

## 2018-06-19 RX ADMIN — GLIPIZIDE 2.5 MG: 2.5 TABLET, FILM COATED, EXTENDED RELEASE ORAL at 08:10

## 2018-06-19 RX ADMIN — PANTOPRAZOLE SODIUM 40 MG: 40 TABLET, DELAYED RELEASE ORAL at 08:10

## 2018-06-19 RX ADMIN — TERBUTALINE SULFATE 5 MG: 5 TABLET ORAL at 13:28

## 2018-06-19 RX ADMIN — PREDNISONE 35 MG: 5 TABLET ORAL at 19:55

## 2018-06-19 RX ADMIN — HYDRALAZINE HYDROCHLORIDE 25 MG: 25 TABLET ORAL at 13:28

## 2018-06-19 RX ADMIN — Medication 1 HALF-TAB: at 19:54

## 2018-06-19 RX ADMIN — Medication 1 MG: at 19:02

## 2018-06-19 RX ADMIN — HYDRALAZINE HYDROCHLORIDE 25 MG: 25 TABLET ORAL at 19:01

## 2018-06-19 RX ADMIN — Medication 1 CAPSULE: at 13:28

## 2018-06-19 RX ADMIN — SENNOSIDES AND DOCUSATE SODIUM 1 TABLET: 8.6; 5 TABLET ORAL at 08:18

## 2018-06-19 RX ADMIN — ACETAMINOPHEN 650 MG: 325 TABLET, FILM COATED ORAL at 19:54

## 2018-06-19 RX ADMIN — HYDRALAZINE HYDROCHLORIDE 10 MG: 20 INJECTION INTRAMUSCULAR; INTRAVENOUS at 05:07

## 2018-06-19 RX ADMIN — PREDNISONE 35 MG: 5 TABLET ORAL at 08:10

## 2018-06-19 RX ADMIN — ACETAMINOPHEN 650 MG: 325 TABLET, FILM COATED ORAL at 08:18

## 2018-06-19 RX ADMIN — NYSTATIN 1000000 UNITS: 100000 SUSPENSION ORAL at 19:02

## 2018-06-19 RX ADMIN — HEPARIN SODIUM 3000 UNITS: 1000 INJECTION, SOLUTION INTRAVENOUS; SUBCUTANEOUS at 12:44

## 2018-06-19 RX ADMIN — HYDRALAZINE HYDROCHLORIDE 25 MG: 25 TABLET ORAL at 22:55

## 2018-06-19 RX ADMIN — HYDRALAZINE HYDROCHLORIDE 25 MG: 25 TABLET ORAL at 08:18

## 2018-06-19 RX ADMIN — NYSTATIN 1000000 UNITS: 100000 SUSPENSION ORAL at 22:54

## 2018-06-19 RX ADMIN — TRAMADOL HYDROCHLORIDE 50 MG: 50 TABLET, COATED ORAL at 22:55

## 2018-06-19 RX ADMIN — NYSTATIN 1000000 UNITS: 100000 SUSPENSION ORAL at 13:28

## 2018-06-19 RX ADMIN — NYSTATIN 1000000 UNITS: 100000 SUSPENSION ORAL at 08:10

## 2018-06-19 RX ADMIN — EPOETIN ALFA 4000 UNITS: 10000 SOLUTION INTRAVENOUS; SUBCUTANEOUS at 12:43

## 2018-06-19 ASSESSMENT — PAIN DESCRIPTION - DESCRIPTORS
DESCRIPTORS: DISCOMFORT
DESCRIPTORS: DISCOMFORT
DESCRIPTORS: ACHING
DESCRIPTORS: DISCOMFORT

## 2018-06-19 NOTE — PROGRESS NOTES
SPIRITUAL HEALTH SERVICES  SPIRITUAL ASSESSMENT Progress Note  [facility] 6C     Pt in good spirits today, feels he is making very good progress post-transplant. Shared a sung blessing and reflected on how pt has a strong community of support.     PLAN: continue to follow, will visit again this week if pt still on unit.    Navid Pearl) Pia Alonso M.Div., [facility]  Staff   Pager [phone]

## 2018-06-19 NOTE — PROGRESS NOTES
HEMODIALYSIS TREATMENT NOTE    Date: 6/19/2018  Time: 1:28 PM    Data:  Ultrafiltration - Post Run Net Total Removed (mL): 4000 mL  Ultrafiltration - Post Run Net Total Gain (mL): 0 mL  Vascular Access Status: Yes, secured and visible  Dialyzer Rinse: Clear  Total Blood Volume Processed: 69.1L  Total Dialysis (Treatment) Time:  3.5 hours    Lab:    @ 1320    Interventions/Assessment:  Pt hypertensive upon arrival, order to d/c D51/2NS and increase UF goal from 3L to 4L per renal NP, Moris Sevilla. Hyperglycemic post-tx. Pt tolerated fluid pull well, normotensive at tx termination. CVC limbs locked with heparin and marked. Hand-off report given to primary RN.     Plan:    Continue HD per renal team.

## 2018-06-19 NOTE — PROGRESS NOTES
"CVTS Daily Note  6/19/2018  Attending: Rony Caputo,*    S:   Overnight events- continued to be hypertensive. Headache resolved with tylenol.   HD run this AM.      Pt seen at bedside resting comfortably.    Does complain of Left leg restlessness and some right sided \"crampy pain like if you run too far\", otherwise no acute complaints.      Denies F/C/Sweats.  No CP, SOB, or calf pain.    Tolerating diet.  + BM a day ago.  + Flatus.  No abd pain or discomfort.   Ambulated well without assistance.    Pain level tolerable. Plan as per Neuro section below.     O:   Vitals:    06/19/18 0520 06/19/18 0612 06/19/18 0718 06/19/18 0758   BP: 165/88  (!) 168/92    BP Location: Right arm  Right arm    Cuff Size:       Pulse:    92   Resp:   16    Temp:   97.6  F (36.4  C)    TempSrc:   Oral    SpO2:   100%    Weight:  78.2 kg (172 lb 4.8 oz)     Height:         Vitals:    06/17/18 1000 06/18/18 0530 06/19/18 0612   Weight: 75.1 kg (165 lb 9.1 oz) 76.9 kg (169 lb 9.6 oz) 78.2 kg (172 lb 4.8 oz)       Intake/Output Summary (Last 24 hours) at 06/19/18 0825  Last data filed at 06/19/18 0642   Gross per 24 hour   Intake             1840 ml   Output               26 ml   Net             1814 ml       MAPs: 112 - 132   Gen: AAO x 3, pleasant, NAD  CV: RRR, S1S2 normal, no murmurs, rubs, or gallops.   Pulm: CTA, no rhonchi or wheezes  Abd: soft, non-tender, no guarding  Ext: no peripheral edema  Incision: clean, dry, intact, no erythema  Chest Tube sites: dressings clean and dry, serosanguinous, no air leak, output 50 cc     * removed pleural tube without immediate complication     Labs:  BMP    Recent Labs  Lab 06/19/18  0657 06/18/18  0827 06/17/18  1630 06/17/18  0433   * 130* 130* 131*   POTASSIUM 5.1 5.0 4.6 4.5   CHLORIDE 94 94 94 94   TRINI 9.0 9.0 9.6 9.6   CO2 18* 22 23 24   BUN 68* 52* 34* 22   CR 6.50* 5.01* 3.48* 2.42*   * 113* 168* 172*     CBC    Recent Labs  Lab 06/19/18  0657 06/18/18  0827 " 06/17/18  0433 06/16/18  1850   WBC 13.3* 14.7* 14.7* 15.8*   RBC 3.22* 3.28* 3.19* 3.22*   HGB 9.5* 9.5* 9.2* 9.4*   HCT 28.6* 29.7* 28.7* 29.1*   MCV 89 91 90 90   MCH 29.5 29.0 28.8 29.2   MCHC 33.2 32.0 32.1 32.3   RDW 16.9* 17.2* 17.5* 17.5*    143* 139* 130*     INR    Recent Labs  Lab 06/19/18  0657 06/18/18  0827 06/17/18  0433 06/16/18  0342   INR 1.01 1.02 1.03 1.16*      Hepatic Panel   Lab Results   Component Value Date    AST 56 06/18/2018     Lab Results   Component Value Date    ALT 34 06/18/2018     Lab Results   Component Value Date    ALBUMIN 3.3 06/18/2018     GLUCOSE:     Recent Labs  Lab 06/19/18  0722 06/19/18  0657 06/19/18  0217 06/18/18  2129 06/18/18  1815 06/18/18  1356 06/18/18  0827 06/18/18  0726  06/17/18  1630  06/17/18  0433  06/16/18  1043  06/16/18  0342   GLC  --  170*  --   --   --   --  113*  --   --  168*  --  172*  --  154*  --  154*   *  --  192* 178* 194* 267*  --  124*  < >  --   < >  --   < >  --   < >  --    < > = values in this interval not displayed.      Imaging:  reviewed recent imaging, large pneumoperitoneum that is stable this AM.       A/P:    Murray Nicholson is a 63 year old male CAD, ICM (EF of 15-20%), ESRD on HD, bicuspid aortic valve with AI, severe MR, and proximal AAA who was admitted for decompensated heart failure 4/16/18 now s/p heart transplant on 6/14/18. Extubated uneventfully, weaned off of the nitric oxide easily.     Neuro:   - Neuro intact  - Scheduled Tylenol and PRN oxycodone   - Leg restlessness complaints     CV:   - No arrhythmia, HD stable.  - ASA 81 mg  - HTN; Hydralazine 25 mg q 6 hrs (patient concerned this is causing leg restlessness)   - Keep TPW capped for now       Pulm:   - Pulm toilet, IS, activity and deep breathing   - R pleural to waterseal 6/18      ID:   - WBC 13.3, afebrile, no signs or symptoms of infection, on steroids  - Immunosuppression per Cardiology      GI / FEN:  - 2 gr Na Reg diet, bowel regimen  - large  pneumoperitoneum on Abd xray, continue to monitor. Asymptomatic.       Renal / :   - Creatinine 5.01, adequate UOP.   - Nephrology following, Dialysis TTS     Heme:   - Hgb 8-9's, Plts 159      Endo:   - Glipizide 2.5 mg q AM (no renal dosing needed)   - Med sliding scale insulin      PPX:   - Protonix     Anticoagulation:   - Hep 5000 U q 8 hrs for DVT prophylaxis      Dispo:   - 6C since 6/17  - Needs RHC/Bx and subsequent wire removal        Discussed with CVTS Fellow and Staff surgeons have been informed of changes   through both verbal and written communication.      Anmol Deal PA-C  Cardiothoracic Surgery  Pager [phone]    8:25 AM   June 19, 2018

## 2018-06-19 NOTE — PLAN OF CARE
Problem: Patient Care Overview  Goal: Plan of Care/Patient Progress Review  RN:  1.Pt will remain hemodynamically stable.  2.BG WNL.   OT/CR 6C  Discharge Planner OT   Patient plan for discharge: Home with assist and OP CR  Current status: SBA/CGA sit<>stand x4 reps throughout session. Pt completed 10 minutes on the Nustep. Pt ambulated up and down 3 stairs x2 reps while holding one hand railing and CGA. Pt ambulated ~225ft with SBA, no AD and one seated rest break.  Barriers to return to prior living situation: acute medical needs, fatigue, SOB  Recommendations for discharge: Home with assist and OP CR  Rationale for recommendations: pt is primarily independent with ADL completion, however would benefit from continued skilled therapy to increase activity tolerance.       Entered by: Annmarie JOHNSON Retterath 06/19/2018 4:28 PM

## 2018-06-19 NOTE — PROGRESS NOTES
Care Coordinator Progress Note    Admission Date/Time:  4/16/2018  Attending MD:  Rony Caputo  Data  Chart reviewed, discussed with interdisciplinary team.   Patient was admitted for:    Chronic systolic heart failure (H)  End stage renal disease (H)  Pt is now s/p Heart transplant on 6/14/18.     Concerns with insurance coverage for discharge needs: TBD  Current Living Situation: Patient said that his two adult sons will be staying with him.   Support System: Supportive and Involved sons: Clara, also friend Naresh Peck.   Services Involved: [facility]  Transportation at Discharge: Family or friend will provide  Transportation to Medical Appointments: family to provide.   Barriers to Discharge: post-op recovery.     Coordination of Care and Referrals: Provided patient/family with options for resumption of outpt dialysis.    Assessment  OT is recommending OPCR; pt is in agreement with this plan.      Per MD, pt will need outpt dialysis set up again. Pt said that he wants to go to [facility] again and prefers an early morning shift on either T-Th-Sat or M-W-F. Per Jacquelin at [facility] (Ph: [phone]), pt was discharged from their dialysis unit as it was expected that pt would get a kidney transplant. Jacquelin added that pt will have to re-submit a referral to [facility].   Intervention:   Referral made to [facility] (Ph: [phone] Fax: [phone]) for readmission to outpt dialysis at [facility].     Plan  Anticipated Discharge Date:  TBD   Anticipated Discharge Plan:  Discharge to home with outpt f/u.   JONATHAN BULLARD RN BSN  Care Coordinator Unit   [phone]  ADDENDUM: (6/20/18)  D: This writer spoke with Jacquelin at [facility] and pt's new schedule will be T-Th-Sat at 11 AM; pt in agreement with this schedule. Jacquelin said that pt can start on Saturday  6/23/18.   I: Arrangements made with [facility] (Ph: [phone] Fax: [phone]) for restarting of outpt dialysis on T-Th-Sat at 11 AM.

## 2018-06-19 NOTE — PROGRESS NOTES
"Nephrology Progress Note  06/19/2018       Murray Nicholson is a 63 year old year old male with PMH of HTN, DMII, functionally bicuspid aortic valve, CHF/CM (EF 10-15%), ESRD, admitted with worsening dyspnea/SOB, now S/P heart tx 6/14 and started on CRRT, Nephrology managing HD/CRRT.      Interval History  Mr Nicholson is recovering from heart tx 6/14, doing well and running HD today.  Pulling 4L of UF as he is hypertensive, was on IVF due to being NPO which are now stopped.  ON TTS schedule, next run likely 6/21, no major changes in plan, will set up with tx nephrology on discharge.        Assessment & Recommendations:   ESRD: due to DM, on PD since Aug 2017, changed to HD 1/18, now TTS, at [facility] under care Dr Mcpherson, [facility] tunnel, EDW 75.5-76 kg, 3.5 hrs.                          -Seen on HD today, increased goal UF from 3 to 4L due to SBP in 170's.          Volume-EDW 76 kg.  A bit above at 78.2kg today, pulling 3-4L to challenge EDW although is breathing comfortably and has minimal edema.        BMD- Ca 9.0, alb 3.3 last check, phos 5.2, no acute issues.      Heart Tx-Still on inotropes and chronotropic gtt's post surgery, started on mycophenolate and methylpred.      Anemia-Cont venofer 100 mg Qtues, cont Epo 4000 u with dialysis now that he is on HD.        Nutrition-Deferred for today, was on D5 overnight, starting to take PO      Discussed with Dr Tena      Recommendations were communicated to team via verbal communication.          Moris Sevilla  Clinical Nurse Specialist  [phone]    Review of Systems:   I reviewed the following systems:  ROS not done due to vent/sedation.     Physical Exam:   I/O last 3 completed shifts:  In: 1840 [P.O.:1050; I.V.:790]  Out: 56 [Chest Tube:56]   /67  Pulse 93  Temp 98.5  F (36.9  C) (Oral)  Resp 16  Ht 1.727 m (5' 8\")  Wt 78.2 kg (172 lb 6.4 oz)  SpO2 99%  BMI 26.21 kg/m2     GENERAL APPEARANCE: Lying in bed, in no distress  EYES:  No scleral icterus, " pupils equal  HENT: mouth without ulcers or lesions  PULM: lungs clear to auscultation, equal air movement, no cyanosis or clubbing  CV: regular rhythm, normal rate, no rub     -JVP not elevated     -edema trace LE.  GI: soft, non-tender, not distended, bowel sounds are +  MS: no evidence of inflammation in joints, no muscle tenderness  NEURO: Alert and oriented  Lines-tunneled HD line    Labs:   All labs reviewed by me  Electrolytes/Renal -   Recent Labs   Lab Test  06/19/18   0657  06/18/18   0827  06/17/18   1630   NA  128*  130*  130*   POTASSIUM  5.1  5.0  4.6   CHLORIDE  94  94  94   CO2  18*  22  23   BUN  68*  52*  34*   CR  6.50*  5.01*  3.48*   GLC  170*  113*  168*   TRINI  9.0  9.0  9.6   MAG  2.2  2.2  2.2   PHOS  5.2*  4.0  4.3       CBC -   Recent Labs   Lab Test  06/19/18   0657  06/18/18   0827  06/17/18   0433   WBC  13.3*  14.7*  14.7*   HGB  9.5*  9.5*  9.2*   PLT  159  143*  139*       LFTs -   Recent Labs   Lab Test  06/18/18   0827  06/17/18   0433  06/16/18   0342   ALKPHOS  114  105  85   BILITOTAL  0.8  1.1  2.0*   ALT  34  33  30   AST  56*  92*  111*   PROTTOTAL  6.9  7.2  6.2*   ALBUMIN  3.3*  3.5  3.0*       Iron Panel -   Recent Labs   Lab Test  05/08/18   0954  07/19/17   1306  07/05/17   1204   IRON  58  46  26*   IRONSAT  27  18  12*   ALBERTINA  621*  369  542*           Current Medications:    aspirin  81 mg Oral Daily     glipiZIDE  2.5 mg Oral Daily with breakfast     heparin  5,000 Units Subcutaneous Q8H     hydrALAZINE  25 mg Oral 4x Daily     insulin aspart  1-7 Units Subcutaneous TID AC     insulin aspart  1-5 Units Subcutaneous At Bedtime     mycophenolate  1,500 mg Oral BID IS     NEPHROCAPS  1 capsule Oral Daily     nystatin  1,000,000 Units Swish & Swallow 4x Daily     pantoprazole  40 mg Oral QAM     pravastatin  40 mg Oral Daily     predniSONE  35 mg Oral BID    Followed by     [START ON 6/20/2018] predniSONE  30 mg Oral BID    Followed by     [START ON 6/22/2018] predniSONE   25 mg Oral BID    Followed by     [START ON 6/24/2018] predniSONE  20 mg Oral BID     senna-docusate  1 tablet Oral BID     sodium chloride (PF)  3 mL Intracatheter Q8H     sulfamethoxazole-trimethoprim  1 half-tab Oral QPM     tacrolimus  1 mg Oral BID IS     terbutaline  5 mg Oral or Feeding Tube Q8H     valGANciclovir  450 mg Oral or Feeding Tube Once per day on Mon Thu       IV fluid REPLACEMENT ONLY       IV fluid REPLACEMENT ONLY       dextrose 5% and 0.45% NaCl 10 mL/hr at 06/15/18 0900     Reason beta blocker order not selected

## 2018-06-19 NOTE — PLAN OF CARE
Problem: Patient Care Overview  Goal: Plan of Care/Patient Progress Review  RN:  1.Pt will remain hemodynamically stable.  2.BG WNL.   Outcome: No Change  D: S/p OHT 6/14.  I/A: A&Ox4. VSS on RA. BPs continue to run 150s-170s/90s-100s, prn hydralazine given with no resolve. SR 90s. C/o R ribcage pain partially relieved by hot packs and c/o HA partially relieved by PRN tylenol. D5W1/2NS infusing at 75 mL/hr for NPO status, BG stable overnight. Pacer wires capped, temp pacer at bedside. R pleural CT to water seal with minimal serosanguinous output, drsg CDI. Oliguric on HD. SBA. Appeared to rest comfortably overnight.  P: Abd and chest XRs this AM. Dialysis at 0730. RHC and biopsy later this week. Continue to monitor and notify CVTS with questions/concerns.    Addendum: ~4 lb weight gain since yesterday. CVTS notified.

## 2018-06-19 NOTE — SUMMARY OF CARE
-Pt 4 kg of fluid weight removed during a morning dialysis run.  -Pt was reinstated to a full liquid diet upon his return and was dissappointed that it wasn't solid food.  -Pt had his final chest tube removed.

## 2018-06-20 ENCOUNTER — APPOINTMENT (OUTPATIENT)
Dept: CARDIOLOGY | Facility: CLINIC | Age: 63
DRG: 001 | End: 2018-06-20
Attending: INTERNAL MEDICINE
Payer: COMMERCIAL

## 2018-06-20 ENCOUNTER — APPOINTMENT (OUTPATIENT)
Dept: OCCUPATIONAL THERAPY | Facility: CLINIC | Age: 63
DRG: 001 | End: 2018-06-20
Attending: INTERNAL MEDICINE
Payer: COMMERCIAL

## 2018-06-20 ENCOUNTER — APPOINTMENT (OUTPATIENT)
Dept: GENERAL RADIOLOGY | Facility: CLINIC | Age: 63
DRG: 001 | End: 2018-06-20
Attending: PHYSICIAN ASSISTANT
Payer: COMMERCIAL

## 2018-06-20 LAB
ANION GAP SERPL CALCULATED.3IONS-SCNC: 15 MMOL/L (ref 3–14)
APTT PPP: 33 SEC (ref 22–37)
BUN SERPL-MCNC: 40 MG/DL (ref 7–30)
CALCIUM SERPL-MCNC: 9.1 MG/DL (ref 8.5–10.1)
CHLORIDE SERPL-SCNC: 97 MMOL/L (ref 94–109)
CO2 SERPL-SCNC: 20 MMOL/L (ref 20–32)
COPATH REPORT: NORMAL
CREAT SERPL-MCNC: 4.77 MG/DL (ref 0.66–1.25)
CROSSMATCH RESULT: NORMAL
CROSSMATCH RESULT: NORMAL
ERYTHROCYTE [DISTWIDTH] IN BLOOD BY AUTOMATED COUNT: 16.8 % (ref 10–15)
GFR SERPL CREATININE-BSD FRML MDRD: 12 ML/MIN/1.7M2
GLUCOSE BLDC GLUCOMTR-MCNC: 150 MG/DL (ref 70–99)
GLUCOSE BLDC GLUCOMTR-MCNC: 194 MG/DL (ref 70–99)
GLUCOSE BLDC GLUCOMTR-MCNC: 269 MG/DL (ref 70–99)
GLUCOSE BLDC GLUCOMTR-MCNC: 277 MG/DL (ref 70–99)
GLUCOSE SERPL-MCNC: 263 MG/DL (ref 70–99)
HCT VFR BLD AUTO: 30.7 % (ref 40–53)
HGB BLD-MCNC: 9.8 G/DL (ref 13.3–17.7)
INR PPP: 1.04 (ref 0.86–1.14)
MCH RBC QN AUTO: 28.9 PG (ref 26.5–33)
MCHC RBC AUTO-ENTMCNC: 31.9 G/DL (ref 31.5–36.5)
MCV RBC AUTO: 91 FL (ref 78–100)
PLATELET # BLD AUTO: 193 10E9/L (ref 150–450)
POTASSIUM SERPL-SCNC: 4.8 MMOL/L (ref 3.4–5.3)
RBC # BLD AUTO: 3.39 10E12/L (ref 4.4–5.9)
SODIUM SERPL-SCNC: 132 MMOL/L (ref 133–144)
TACROLIMUS BLD-MCNC: <3 UG/L (ref 5–15)
TME LAST DOSE: ABNORMAL H
WBC # BLD AUTO: 10.5 10E9/L (ref 4–11)

## 2018-06-20 PROCEDURE — 25000131 ZZH RX MED GY IP 250 OP 636 PS 637: Performed by: INTERNAL MEDICINE

## 2018-06-20 PROCEDURE — 85610 PROTHROMBIN TIME: CPT | Performed by: THORACIC SURGERY (CARDIOTHORACIC VASCULAR SURGERY)

## 2018-06-20 PROCEDURE — 25000131 ZZH RX MED GY IP 250 OP 636 PS 637: Performed by: STUDENT IN AN ORGANIZED HEALTH CARE EDUCATION/TRAINING PROGRAM

## 2018-06-20 PROCEDURE — 93505 ENDOMYOCARDIAL BIOPSY: CPT | Mod: 26 | Performed by: INTERNAL MEDICINE

## 2018-06-20 PROCEDURE — 25500064 ZZH RX 255 OP 636: Performed by: THORACIC SURGERY (CARDIOTHORACIC VASCULAR SURGERY)

## 2018-06-20 PROCEDURE — 85730 THROMBOPLASTIN TIME PARTIAL: CPT | Performed by: THORACIC SURGERY (CARDIOTHORACIC VASCULAR SURGERY)

## 2018-06-20 PROCEDURE — 02BM3ZX EXCISION OF VENTRICULAR SEPTUM, PERCUTANEOUS APPROACH, DIAGNOSTIC: ICD-10-PCS | Performed by: INTERNAL MEDICINE

## 2018-06-20 PROCEDURE — 00000146 ZZHCL STATISTIC GLUCOSE BY METER IP

## 2018-06-20 PROCEDURE — 71046 X-RAY EXAM CHEST 2 VIEWS: CPT

## 2018-06-20 PROCEDURE — 27210806 ZZH SHEATH CR5

## 2018-06-20 PROCEDURE — 36415 COLL VENOUS BLD VENIPUNCTURE: CPT | Performed by: THORACIC SURGERY (CARDIOTHORACIC VASCULAR SURGERY)

## 2018-06-20 PROCEDURE — 27211181 ZZH BALLOON TIP PRESSURE CR5

## 2018-06-20 PROCEDURE — 25000132 ZZH RX MED GY IP 250 OP 250 PS 637: Performed by: THORACIC SURGERY (CARDIOTHORACIC VASCULAR SURGERY)

## 2018-06-20 PROCEDURE — 25000132 ZZH RX MED GY IP 250 OP 250 PS 637: Mod: GY | Performed by: THORACIC SURGERY (CARDIOTHORACIC VASCULAR SURGERY)

## 2018-06-20 PROCEDURE — 4A023N6 MEASUREMENT OF CARDIAC SAMPLING AND PRESSURE, RIGHT HEART, PERCUTANEOUS APPROACH: ICD-10-PCS | Performed by: INTERNAL MEDICINE

## 2018-06-20 PROCEDURE — 76932 ECHO GUIDE FOR HEART BIOPSY: CPT | Mod: 26 | Performed by: INTERNAL MEDICINE

## 2018-06-20 PROCEDURE — 25000132 ZZH RX MED GY IP 250 OP 250 PS 637: Mod: GY | Performed by: STUDENT IN AN ORGANIZED HEALTH CARE EDUCATION/TRAINING PROGRAM

## 2018-06-20 PROCEDURE — 21400006 ZZH R&B CCU INTERMEDIATE UMMC

## 2018-06-20 PROCEDURE — 76932 ECHO GUIDE FOR HEART BIOPSY: CPT

## 2018-06-20 PROCEDURE — 25000128 H RX IP 250 OP 636: Performed by: STUDENT IN AN ORGANIZED HEALTH CARE EDUCATION/TRAINING PROGRAM

## 2018-06-20 PROCEDURE — 27210787 ZZH MANIFOLD CR2

## 2018-06-20 PROCEDURE — 93505 ENDOMYOCARDIAL BIOPSY: CPT

## 2018-06-20 PROCEDURE — A9270 NON-COVERED ITEM OR SERVICE: HCPCS | Mod: GY | Performed by: PHYSICIAN ASSISTANT

## 2018-06-20 PROCEDURE — 27210982 ZZH KIT RT HC TOTES DISP CR7

## 2018-06-20 PROCEDURE — A9270 NON-COVERED ITEM OR SERVICE: HCPCS | Mod: GY | Performed by: STUDENT IN AN ORGANIZED HEALTH CARE EDUCATION/TRAINING PROGRAM

## 2018-06-20 PROCEDURE — 88307 TISSUE EXAM BY PATHOLOGIST: CPT | Performed by: THORACIC SURGERY (CARDIOTHORACIC VASCULAR SURGERY)

## 2018-06-20 PROCEDURE — 25000132 ZZH RX MED GY IP 250 OP 250 PS 637: Mod: GY | Performed by: PHYSICIAN ASSISTANT

## 2018-06-20 PROCEDURE — 93306 TTE W/DOPPLER COMPLETE: CPT

## 2018-06-20 PROCEDURE — 40000133 ZZH STATISTIC OT WARD VISIT: Performed by: OCCUPATIONAL THERAPIST

## 2018-06-20 PROCEDURE — 80048 BASIC METABOLIC PNL TOTAL CA: CPT | Performed by: THORACIC SURGERY (CARDIOTHORACIC VASCULAR SURGERY)

## 2018-06-20 PROCEDURE — 99232 SBSQ HOSP IP/OBS MODERATE 35: CPT | Mod: 25 | Performed by: INTERNAL MEDICINE

## 2018-06-20 PROCEDURE — 88350 IMFLUOR EA ADDL 1ANTB STN PX: CPT | Performed by: THORACIC SURGERY (CARDIOTHORACIC VASCULAR SURGERY)

## 2018-06-20 PROCEDURE — A9270 NON-COVERED ITEM OR SERVICE: HCPCS | Mod: GY | Performed by: THORACIC SURGERY (CARDIOTHORACIC VASCULAR SURGERY)

## 2018-06-20 PROCEDURE — 93306 TTE W/DOPPLER COMPLETE: CPT | Mod: 26 | Performed by: INTERNAL MEDICINE

## 2018-06-20 PROCEDURE — 27211047 ZZH FORCEP BIOPSY CR10

## 2018-06-20 PROCEDURE — 97110 THERAPEUTIC EXERCISES: CPT | Mod: GO | Performed by: OCCUPATIONAL THERAPIST

## 2018-06-20 PROCEDURE — 25000125 ZZHC RX 250: Performed by: INTERNAL MEDICINE

## 2018-06-20 PROCEDURE — 85027 COMPLETE CBC AUTOMATED: CPT | Performed by: THORACIC SURGERY (CARDIOTHORACIC VASCULAR SURGERY)

## 2018-06-20 PROCEDURE — 88346 IMFLUOR 1ST 1ANTB STAIN PX: CPT | Performed by: THORACIC SURGERY (CARDIOTHORACIC VASCULAR SURGERY)

## 2018-06-20 PROCEDURE — 80197 ASSAY OF TACROLIMUS: CPT | Performed by: THORACIC SURGERY (CARDIOTHORACIC VASCULAR SURGERY)

## 2018-06-20 PROCEDURE — C1893 INTRO/SHEATH, FIXED,NON-PEEL: HCPCS

## 2018-06-20 PROCEDURE — 25000128 H RX IP 250 OP 636: Performed by: INTERNAL MEDICINE

## 2018-06-20 RX ORDER — TACROLIMUS 1 MG/1
1 CAPSULE, GELATIN COATED ORAL
Status: DISCONTINUED | OUTPATIENT
Start: 2018-06-20 | End: 2018-06-20

## 2018-06-20 RX ORDER — TACROLIMUS 1 MG/1
1 CAPSULE ORAL
Status: DISCONTINUED | OUTPATIENT
Start: 2018-06-20 | End: 2018-06-20

## 2018-06-20 RX ADMIN — HEPARIN SODIUM 5000 UNITS: 5000 INJECTION, SOLUTION INTRAVENOUS; SUBCUTANEOUS at 05:26

## 2018-06-20 RX ADMIN — HYDRALAZINE HYDROCHLORIDE 25 MG: 25 TABLET ORAL at 07:56

## 2018-06-20 RX ADMIN — HEPARIN SODIUM 5000 UNITS: 5000 INJECTION, SOLUTION INTRAVENOUS; SUBCUTANEOUS at 14:12

## 2018-06-20 RX ADMIN — TACROLIMUS 75 MCG/HR: 5 INJECTION, SOLUTION INTRAVENOUS at 16:09

## 2018-06-20 RX ADMIN — HYDRALAZINE HYDROCHLORIDE 25 MG: 25 TABLET ORAL at 20:41

## 2018-06-20 RX ADMIN — PREDNISONE 30 MG: 20 TABLET ORAL at 20:41

## 2018-06-20 RX ADMIN — ACETAMINOPHEN 650 MG: 325 TABLET, FILM COATED ORAL at 22:55

## 2018-06-20 RX ADMIN — TERBUTALINE SULFATE 5 MG: 5 TABLET ORAL at 20:40

## 2018-06-20 RX ADMIN — MYCOPHENOLATE MOFETIL 1500 MG: 250 CAPSULE ORAL at 18:17

## 2018-06-20 RX ADMIN — PRAVASTATIN SODIUM 40 MG: 40 TABLET ORAL at 20:41

## 2018-06-20 RX ADMIN — HYDRALAZINE HYDROCHLORIDE 10 MG: 20 INJECTION INTRAMUSCULAR; INTRAVENOUS at 22:59

## 2018-06-20 RX ADMIN — Medication 1 MG: at 07:56

## 2018-06-20 RX ADMIN — HYDRALAZINE HYDROCHLORIDE 25 MG: 25 TABLET ORAL at 16:09

## 2018-06-20 RX ADMIN — NYSTATIN 1000000 UNITS: 100000 SUSPENSION ORAL at 15:05

## 2018-06-20 RX ADMIN — GLIPIZIDE 2.5 MG: 2.5 TABLET, FILM COATED, EXTENDED RELEASE ORAL at 07:55

## 2018-06-20 RX ADMIN — HYDRALAZINE HYDROCHLORIDE 25 MG: 25 TABLET ORAL at 14:12

## 2018-06-20 RX ADMIN — PANTOPRAZOLE SODIUM 40 MG: 40 TABLET, DELAYED RELEASE ORAL at 07:55

## 2018-06-20 RX ADMIN — TRAMADOL HYDROCHLORIDE 50 MG: 50 TABLET, COATED ORAL at 22:55

## 2018-06-20 RX ADMIN — TERBUTALINE SULFATE 5 MG: 5 TABLET ORAL at 05:26

## 2018-06-20 RX ADMIN — TERBUTALINE SULFATE 5 MG: 5 TABLET ORAL at 14:12

## 2018-06-20 RX ADMIN — NYSTATIN 1000000 UNITS: 100000 SUSPENSION ORAL at 07:56

## 2018-06-20 RX ADMIN — HUMAN ALBUMIN MICROSPHERES AND PERFLUTREN 5 ML: 10; .22 INJECTION, SOLUTION INTRAVENOUS at 11:45

## 2018-06-20 RX ADMIN — Medication 1 CAPSULE: at 07:55

## 2018-06-20 RX ADMIN — HEPARIN SODIUM 5000 UNITS: 5000 INJECTION, SOLUTION INTRAVENOUS; SUBCUTANEOUS at 20:40

## 2018-06-20 RX ADMIN — PREDNISONE 30 MG: 20 TABLET ORAL at 07:55

## 2018-06-20 RX ADMIN — NYSTATIN 1000000 UNITS: 100000 SUSPENSION ORAL at 20:40

## 2018-06-20 RX ADMIN — Medication 1 HALF-TAB: at 20:44

## 2018-06-20 RX ADMIN — MYCOPHENOLATE MOFETIL 1500 MG: 250 CAPSULE ORAL at 07:55

## 2018-06-20 RX ADMIN — SENNOSIDES AND DOCUSATE SODIUM 1 TABLET: 8.6; 5 TABLET ORAL at 20:41

## 2018-06-20 RX ADMIN — ASPIRIN 81 MG: 81 TABLET, COATED ORAL at 07:55

## 2018-06-20 RX ADMIN — SENNOSIDES AND DOCUSATE SODIUM 1 TABLET: 8.6; 5 TABLET ORAL at 07:55

## 2018-06-20 NOTE — PROGRESS NOTES
Met with pt to discuss outpt follow up. Discussed/reviewed clinic/biopsy schedule, blood work, timing of prograf checks, medications, home routines, s/sx of rejection, preventive cares. Contact information, business and non business hours, provided to pt.     Enc to call with questions.    Please update pt med card and provide DVDs for him to watch, Thanks!

## 2018-06-20 NOTE — PLAN OF CARE
Problem: Patient Care Overview  Goal: Plan of Care/Patient Progress Review  RN:  1.Pt will remain hemodynamically stable.  2.BG WNL.   Outcome: No Change  D: S/p OHT 6/14.  I/A: A&Ox4. VSS on RA. SR/ST 90s-100s. C/o generalized pain partially relieved by PRN tylenol and tramadol. Pacer wires capped, temp pacer at bedside. Tolerating full liquid diet. Oliguric on HD. SBA. Appeared to rest comfortably overnight. Transplant learning materials given to patient, teaching initiated, and PLC consult placed.  P: NPO at [facility] for RHC and biopsy 6/21 (pacer leads to be removed following). Continue to reinforce transplant education. PLC to schedule appointment for transplant teaching. Continue to monitor and notify CVTS with questions/concerns.

## 2018-06-20 NOTE — PROGRESS NOTES
Cardiology Heart Failure Progress Note    Assessment & Plan   63 year old male with a PMH of CAD, ICM (EF of 15-20%), ESRD, bicuspid aortic valve with AI, severe MR, and proximal AAA who was admitted for decompensated heart failure. He underwent OHT on 7/15/2018.       Today's change  - RHC and biopsy  - start IV tacro given low level of tacro    - Hemodynamic support                        - isoproterenol off -> transitioned to terbutaline 5mg q8h VVI epicardial pacing (lower rate limit at 80)                        - Per Surgery team, to have SBP <130 due to visualized aortic aneurysm       - Immunosuppression                        - Mycophenolate 1500 mg bid (transition to PO 6/16), methylprednisolone -> prednisone 50 mg bid from 6/16- with continued taper now 30 mg bid                        - starting Tacro, now 1.5mg bid(6/16) ->1 mg bid(6/7): switch to iv tacro 6/20, daily tacro levels     - Infection prophylaxis-  CMV/ recepient - / donor +,   EBV + / pending    High risk Donor - will need to check infection HIV, hepatitis panel and so forth in 1-2 months                        - Nystatin daily                        - Trimethoprim-sulfamethoxazole renally dosed                                                  - valganciclovir x2/week as HD adjusted dosage     - CAV prophylaxis                        - Hold aspirin/rosuvastatin until improvement in systemic condition     # right IV thromus    # Chronic Medical Issues:  - Seborrheic keratoses / Dermatofibromas / Multiple benign nevi / Nummular dermatitis: derm consulted, all lesions benign  - Hx of allergic rhinitis: cetrizine  - ESRD: Dialysis T, Th, Sa   - Ascending aortic aneurysm: 4.5 x 4.5 cm proximal ascending aortic aneurysm seen on MRI 2/14  - Multiple benign branch duct-IPMNs: no concerning features affecting transplant candidacy  - gout: allopurinol, prednisone 6/26 - 5/31 (tapered for acute flare)        [facility]   Cardiology  "fellow    Interval History     No acute events  HR in the 90's with terbutaline  Feeling better.  HD went well    Physical Exam   Temp: 98.1  F (36.7  C) Temp src: Oral BP: (!) 143/96   Heart Rate: 97 Resp: 16 SpO2: 100 % O2 Device: None (Room air)    Vitals:    06/19/18 0612 06/19/18 0906 06/20/18 0549   Weight: 78.2 kg (172 lb 4.8 oz) 78.2 kg (172 lb 6.4 oz) 74.9 kg (165 lb 1.6 oz)     Vital Signs with Ranges  Temp:  [97.9  F (36.6  C)-98.2  F (36.8  C)] 98.1  F (36.7  C)  Heart Rate:  [] 97  Resp:  [16] 16  BP: (124-146)/(72-96) 143/96  SpO2:  [93 %-100 %] 100 %  I/O last 3 completed shifts:  In: 450 [P.O.:450]  Out: 4000 [Other:4000]    Heart Rate: 97, Blood pressure (!) 143/96, pulse 93, temperature 98.1  F (36.7  C), temperature source Oral, resp. rate 16, height 1.727 m (5' 8\"), weight 74.9 kg (165 lb 1.6 oz), SpO2 100 %.  165 lbs 1.6 oz  GEN:  NAD, eating breakfast   CV: tachycardic, no murmur, no S3 or S4  LUNGS:  Lungs with decreased breath sounds  ABD:  Active bowel sounds, soft, non-tender/non-distended.  No rebound/guarding/rigidity.  EXT:  No edema or cyanosis.      Medications     IV fluid REPLACEMENT ONLY       IV fluid REPLACEMENT ONLY       dextrose 5% and 0.45% NaCl 10 mL/hr at 06/15/18 0900     Reason beta blocker order not selected         aspirin  81 mg Oral Daily     glipiZIDE  2.5 mg Oral Daily with breakfast     heparin  5,000 Units Subcutaneous Q8H     hydrALAZINE  25 mg Oral 4x Daily     insulin aspart  1-7 Units Subcutaneous TID AC     insulin aspart  1-5 Units Subcutaneous At Bedtime     mycophenolate  1,500 mg Oral BID IS     NEPHROCAPS  1 capsule Oral Daily     nystatin  1,000,000 Units Swish & Swallow 4x Daily     pantoprazole  40 mg Oral QAM     pravastatin  40 mg Oral Daily     predniSONE  30 mg Oral BID    Followed by     [START ON 6/22/2018] predniSONE  25 mg Oral BID    Followed by     [START ON 6/24/2018] predniSONE  20 mg Oral BID     senna-docusate  1 tablet Oral BID     " sodium chloride (PF)  3 mL Intracatheter Q8H     sulfamethoxazole-trimethoprim  1 half-tab Oral QPM     tacrolimus  1 mg Oral BID IS     terbutaline  5 mg Oral or Feeding Tube Q8H     valGANciclovir  450 mg Oral or Feeding Tube Once per day on Mon Thu       Data     Recent Labs  Lab 06/20/18  0433 06/19/18  0657 06/18/18  0827  06/17/18  0433   WBC 10.5 13.3* 14.7*  --  14.7*   HGB 9.8* 9.5* 9.5*  --  9.2*   MCV 91 89 91  --  90    159 143*  --  139*   INR 1.04 1.01 1.02  --  1.03   * 128* 130*  < > 131*   POTASSIUM 4.8 5.1 5.0  < > 4.5   CHLORIDE 97 94 94  < > 94   CO2 20 18* 22  < > 24   BUN 40* 68* 52*  < > 22   CR 4.77* 6.50* 5.01*  < > 2.42*   ANIONGAP 15* 16* 14  < > 12   TRINI 9.1 9.0 9.0  < > 9.6   * 170* 113*  < > 172*   ALBUMIN  --   --  3.3*  --  3.5   PROTTOTAL  --   --  6.9  --  7.2   BILITOTAL  --   --  0.8  --  1.1   ALKPHOS  --   --  114  --  105   ALT  --   --  34  --  33   AST  --   --  56*  --  92*   < > = values in this interval not displayed.    Recent Results (from the past 24 hour(s))   XR Chest 2 Views    Narrative    EXAM: XR CHEST 2 VW  6/20/2018 9:54 AM     HISTORY:  recheck peritoneal air and R pleural out yesterday;        COMPARISON: Chest radiograph 6/19/2018    FINDINGS: PA and lateral views of chest. Sternotomy wires. Stable left  PICC line, the tip projecting over cavoatrial junction. Right chest  tube has been removed. Cardiac size is within normal limits. Stable  right-sided dual-lumen central catheter. Stable size of  pneumoperitoneum. Minimal bibasilar streaky opacities. No pneumothorax  or pleural effusion.      Impression    IMPRESSION:   1. Stable pneumoperitoneum.  2. Mild bibasilar opacities, likely atelectasis.    I have personally reviewed the examination and initial interpretation  and I agree with the findings.    CLARICE VILLARREAL MD     I personally provided care for this patient, reviewed chart, discussed course with patient, housestaff and consulting  physicians.  I answered all questions.    Rhett Austin M.D.  Division of Cardiology  Department of Medicine  I personally provided care for this patient, reviewed chart, discussed course with patient, housestaff and consulting physicians.  I answered all questions.    Rhett Austin M.D.  Division of Cardiology  Department of Medicine

## 2018-06-20 NOTE — PROGRESS NOTES
"Nephrology Progress Note  06/20/2018             Murray Nicholson is a 63 year old year old male with PMH of HTN, DMII, functionally bicuspid aortic valve, CHF/CM (EF 10-15%), ESRD, admitted with worsening dyspnea/SOB, now S/P heart tx 6/14 and started on CRRT, Nephrology managing HD/CRRT.      Interval History  Mr Nicholson is recovering well from heart tx last week, feeling better post HD runs than prior to transplant.  Pulled 4L yesterday without issue, is technically below his EDW now, breathing comfortably and BP's are reasonable.  No indication for HD today, plan for run tomorrow on schedule.      Assessment & Recommendations:   ESRD: due to DM, on PD since Aug 2017, changed to HD 1/18, now TTS, at [facility] under care Dr Mcpherson, RI tunnel, EDW 75.5-76 kg, 3.5 hrs.                          -No indication for HD today, plan for run tomorrow on schedule.        Volume-EDW 76kg, down to 74.9 today with reasonable BP's and no edema on exam, will continue to challenge while he is inpt.      Electrolytes/pH-K 4.8, bicarb 20, no issues.        BMD- Ca 9.1, alb 3.3 last check, phos 5.2 yesterday, no acute issues.      Heart Tx-On mycophenolate and methylpred.      Anemia-Cont venofer 100 mg Qtues, cont Epo 4000 u with dialysis now that he is on iHD.        Nutrition-Taking PO.      Discussed with Dr Tena      Recommendations were communicated to team via verbal communication.       Moris Sevilla  Clinical Nurse Specialist  [phone]    Review of Systems:   I reviewed the following systems:  ROS not done due to vent/sedation.     Physical Exam:   I/O last 3 completed shifts:  In: 450 [P.O.:450]  Out: 4000 [Other:4000]   BP (!) 143/96 (BP Location: Right arm)  Pulse 93  Temp 98.1  F (36.7  C) (Oral)  Resp 16  Ht 1.727 m (5' 8\")  Wt 74.9 kg (165 lb 1.6 oz)  SpO2 100%  BMI 25.1 kg/m2     GENERAL APPEARANCE: Lying in bed, in no distress  EYES:  No scleral icterus, pupils equal  HENT: mouth without ulcers or " lesions  PULM: lungs clear to auscultation, equal air movement, no cyanosis or clubbing  CV: regular rhythm, normal rate, no rub     -JVP not elevated     -edema trace LE.  GI: soft, non-tender, not distended, bowel sounds are +  MS: no evidence of inflammation in joints, no muscle tenderness  NEURO: Alert and oriented  Lines-tunneled HD line     Labs:   All labs reviewed by me  Electrolytes/Renal -   Recent Labs   Lab Test  06/20/18   0433  06/19/18   0657  06/18/18   0827  06/17/18   1630   NA  132*  128*  130*  130*   POTASSIUM  4.8  5.1  5.0  4.6   CHLORIDE  97  94  94  94   CO2  20  18*  22  23   BUN  40*  68*  52*  34*   CR  4.77*  6.50*  5.01*  3.48*   GLC  263*  170*  113*  168*   TRINI  9.1  9.0  9.0  9.6   MAG   --   2.2  2.2  2.2   PHOS   --   5.2*  4.0  4.3       CBC -   Recent Labs   Lab Test  06/20/18   0433  06/19/18   0657  06/18/18   0827   WBC  10.5  13.3*  14.7*   HGB  9.8*  9.5*  9.5*   PLT  193  159  143*       LFTs -   Recent Labs   Lab Test  06/18/18   0827  06/17/18   0433  06/16/18   0342   ALKPHOS  114  105  85   BILITOTAL  0.8  1.1  2.0*   ALT  34  33  30   AST  56*  92*  111*   PROTTOTAL  6.9  7.2  6.2*   ALBUMIN  3.3*  3.5  3.0*       Iron Panel -   Recent Labs   Lab Test  05/08/18   0954  07/19/17   1306  07/05/17   1204   IRON  58  46  26*   IRONSAT  27  18  12*   ALBERTINA  621*  369  542*           Current Medications:    aspirin  81 mg Oral Daily     glipiZIDE  2.5 mg Oral Daily with breakfast     heparin  5,000 Units Subcutaneous Q8H     hydrALAZINE  25 mg Oral 4x Daily     insulin aspart  1-7 Units Subcutaneous TID AC     insulin aspart  1-5 Units Subcutaneous At Bedtime     mycophenolate  1,500 mg Oral BID IS     NEPHROCAPS  1 capsule Oral Daily     nystatin  1,000,000 Units Swish & Swallow 4x Daily     pantoprazole  40 mg Oral QAM     pravastatin  40 mg Oral Daily     predniSONE  30 mg Oral BID    Followed by     [START ON 6/22/2018] predniSONE  25 mg Oral BID    Followed by     [START  ON 6/24/2018] predniSONE  20 mg Oral BID     senna-docusate  1 tablet Oral BID     sodium chloride (PF)  3 mL Intracatheter Q8H     sulfamethoxazole-trimethoprim  1 half-tab Oral QPM     tacrolimus  1 mg Oral BID IS     terbutaline  5 mg Oral or Feeding Tube Q8H     valGANciclovir  450 mg Oral or Feeding Tube Once per day on Mon Thu       IV fluid REPLACEMENT ONLY       IV fluid REPLACEMENT ONLY       dextrose 5% and 0.45% NaCl 10 mL/hr at 06/15/18 0900     Reason beta blocker order not selected

## 2018-06-20 NOTE — PROGRESS NOTES
CVTS Daily Note  6/20/2018  Attending: Rony Caputo,*    S:   No overnight events. No abd pain, tolerating full liquids last night. Resolved R sided rib pain.     Pt seen at bedside resting comfortably.  No acute complaints.      Denies F/C/Sweats.  No CP, SOB, or calf pain.    Tolerating diet.  + BM.  + Flatus.    Ambulated well without assistance.    Pain level tolerable. Plan as per Neuro section below.     O:   Vitals:    06/20/18 0310 06/20/18 0525 06/20/18 0549 06/20/18 0712   BP:  (!) 146/94  (!) 143/96   BP Location:  Right arm  Right arm   Cuff Size:       Pulse:       Resp: 16 16  16   Temp:    98.1  F (36.7  C)   TempSrc:    Oral   SpO2:  100%  100%   Weight:   74.9 kg (165 lb 1.6 oz)    Height:         Vitals:    06/19/18 0612 06/19/18 0906 06/20/18 0549   Weight: 78.2 kg (172 lb 4.8 oz) 78.2 kg (172 lb 6.4 oz) 74.9 kg (165 lb 1.6 oz)     Intake/Output Summary (Last 24 hours) at 06/20/18 0743  Last data filed at 06/20/18 0525   Gross per 24 hour   Intake              450 ml   Output             4000 ml   Net            -3550 ml       MAPs: 87 - 115   Gen: AAO x 3, pleasant, NAD  CV: RRR, S1S2 normal, no murmurs, rubs, or gallops.   Pulm: CTA, no rhonchi or wheezes  Abd: soft, non-tender, no guarding  Ext: no peripheral edema  Incision: clean, dry, intact, no erythema  Chest Tube sites: dressings clean and dry      Labs:  [facility]    Recent Labs  Lab 06/20/18  0433 06/19/18  0657 06/18/18  0827 06/17/18  1630   * 128* 130* 130*   POTASSIUM 4.8 5.1 5.0 4.6   CHLORIDE 97 94 94 94   TRINI 9.1 9.0 9.0 9.6   CO2 20 18* 22 23   BUN 40* 68* 52* 34*   CR 4.77* 6.50* 5.01* 3.48*   * 170* 113* 168*     CBC    Recent Labs  Lab 06/20/18  0433 06/19/18  0657 06/18/18  0827 06/17/18  0433   WBC 10.5 13.3* 14.7* 14.7*   RBC 3.39* 3.22* 3.28* 3.19*   HGB 9.8* 9.5* 9.5* 9.2*   HCT 30.7* 28.6* 29.7* 28.7*   MCV 91 89 91 90   MCH 28.9 29.5 29.0 28.8   MCHC 31.9 33.2 32.0 32.1   RDW 16.8* 16.9* 17.2* 17.5*     159 143* 139*     INR    Recent Labs  Lab 06/20/18  0433 06/19/18  0657 06/18/18  0827 06/17/18  0433   INR 1.04 1.01 1.02 1.03      Hepatic Panel   Lab Results   Component Value Date    AST 56 06/18/2018     Lab Results   Component Value Date    ALT 34 06/18/2018     Lab Results   Component Value Date    ALBUMIN 3.3 06/18/2018     GLUCOSE:     Recent Labs  Lab 06/20/18  0433 06/20/18  0212 06/19/18  2149 06/19/18  1841 06/19/18  1737 06/19/18  1314 06/19/18  1249  06/19/18  0657  06/18/18  0827  06/17/18  1630  06/17/18  0433  06/16/18  1043   *  --   --   --   --   --   --   --  170*  --  113*  --  168*  --  172*  --  154*   BGM  --  277* 235* 302* 304* 172* 201*  < >  --   < >  --   < >  --   < >  --   < >  --    < > = values in this interval not displayed.      Imaging:  reviewed recent imaging      A/P:    Murray Nicholson is a 63 year old male CAD, ICM (EF of 15-20%), ESRD on HD, bicuspid aortic valve with AI, severe MR, and proximal AAA who was admitted for decompensated heart failure 4/16/18 now s/p heart transplant on 6/14/18. Extubated uneventfully, weaned off of the nitric oxide easily.      Neuro:   - Neuro intact  - Scheduled Tylenol and PRN oxycodone   - Leg restlessness complaints, but slowly improving.       CV:   - No arrhythmia, HD stable.  - ASA 81 mg  - HTN; Hydralazine 25 mg q 6 hrs (patient concerned this is causing leg restlessness)   - Keep TPW capped, remove after results of 1st biopsy    - First RHC and Biopsy scheduled for 6/21       Pulm:   - Pulm toilet, IS, activity and deep breathing   - R pleural to waterseal 6/18       ID:   - WBC 10.5, afebrile, no signs or symptoms of infection, on steroids  - Immunosuppression per Cardiology       GI / FEN:  - 2 gr Na Reg diet, bowel regimen  - large pneumoperitoneum on Abd xray, continue to monitor. Asymptomatic.       Renal / :   - Creatinine 5.01, adequate UOP.   - Nephrology following, Dialysis TTS      Heme:   - Hgb 8-9's, Plts  WNL      Endo:   - Glipizide 2.5 mg q AM (no renal dosing needed)   - Med sliding scale insulin       PPX:   - Protonix      Anticoagulation:   - Hep 5000 U q 8 hrs for DVT prophylaxis       Dispo:   - 6C since 6/17  - Needs RHC/Bx and subsequent wire removal     Discussed with CVTS Fellow   Staff surgeons have been informed of changes through both  verbal and written communication.      Anmol Deal PA-C  Cardiothoracic Surgery  Pager [phone]    7:43 AM   June 20, 2018

## 2018-06-20 NOTE — PROCEDURES
[facility]  CARDIOLOGY   Right Heart Catheterization and Endomyocardial Biopsy  June 20, 2018    Attending Cardiology Staff: John Minaya MD, PhD  Cardiology Fellow: Randolph Clark MD    History: 63 year old male with OHTx in June 2018 and ESRD on dialysis, who is here for hemodynamic evaluation with right heart catheterization and endomyocardial biopsy.    After informing the benefits and risks, an informed consent was obtained. Left neck was prepped and draped in the usual sterile fashion and infiltrated with a 2% lidocaine. 7 Fr sheath was placed in the left internal jugular vein using modified Seldinger technique over a micropuncture wire with ultrasound guidance. RHC was performed using a 7 Fr. [place] Jax catheter. Intracardiac pressures, oxygen saturation, and cardiac output were obtained. Endomyocardial biospy was performed using a 6 fr Jawz bioptome with a directional sheath and echocardiographic guidance. Finally, all catheters were removed from the body and hemostasis obtained by manual compression.     Results (pressures in mmHg):  RA: 8/10/4  RV 34/4  PA 33/15/21;  PA Sat 70.9%  PCWP 9/10/8;  PCW Sat --%  Kassi CO/CI 6.5/3.4 L/min; TD CO 6.9/3.7 L/min  PVR 1.9 garza; TPR 3.0 garza  Hb 9.8 g/dL  NIBP 156/83/112  SVR 1290 dynes*sec/cm^5    Endomyocardial Biopsy  Four samples obtained from RV septum    Complications: None   Blood loss: Minimal blood loss    Conclusions:  1. Normal right and left sided filling pressures.  2. Normal pulmonary artery pressures.  3. Normal cardiac output.  4. Four RV endomyocardial biopsy samples obtained.    Recommendations:  1. Continued medical management for cardiovascular risk factor optimization.  2. Results have been communicated to referring provider. Plan per primary provider.  3. Follow-up on biopsy results.    Randolph Clark MD  Advanced Heart Failure/Heart Transplant Fellow  [facility] Division of Cardiology   [phone]

## 2018-06-20 NOTE — PLAN OF CARE
Problem: Patient Care Overview  Goal: Plan of Care/Patient Progress Review  RN:  1.Pt will remain hemodynamically stable.  2.BG WNL.   Pt AOx4 BP's a little high being treated with scheduled hydralazine. Rt heart cath performed today. Tacro drip started at 75 mcg/hr. Pt learning needs to be  Scheduled for Friday AM as possible DC Friday. Dialysis tomorrow. Will continue with care plan, continue to monitor and notify MD's of any changes.

## 2018-06-20 NOTE — PLAN OF CARE
Problem: Patient Care Overview  Goal: Plan of Care/Patient Progress Review  RN:  1.Pt will remain hemodynamically stable.  2.BG WNL.   OT/CR 6C  Discharge Planner OT   Patient plan for discharge: Home with OP CR  Current status: SBA sit<>stand x5 reps throughout session. Pt ambulated ~225ft x2 with SBA and no AD. Pt tolerated 15 minutes on the Nustep at a resistance of 2.  Barriers to return to prior living situation: fatigue, SOB, acute medical needs  Recommendations for discharge: Home with OP CR  Rationale for recommendations: Pt is primarily independent with ADL completion, however would benefit from continued skilled therapy to increase activity tolerance.       Entered by: Annmarie Freitas 06/20/2018 2:49 PM

## 2018-06-21 ENCOUNTER — TELEPHONE (OUTPATIENT)
Dept: PHARMACY | Facility: CLINIC | Age: 63
End: 2018-06-21

## 2018-06-21 ENCOUNTER — DOCUMENTATION ONLY (OUTPATIENT)
Dept: TRANSPLANT | Facility: CLINIC | Age: 63
End: 2018-06-21

## 2018-06-21 LAB
ANION GAP SERPL CALCULATED.3IONS-SCNC: 16 MMOL/L (ref 3–14)
APTT PPP: 29 SEC (ref 22–37)
BACTERIA SPEC CULT: ABNORMAL
BUN SERPL-MCNC: 71 MG/DL (ref 7–30)
CALCIUM SERPL-MCNC: 9.2 MG/DL (ref 8.5–10.1)
CHLORIDE SERPL-SCNC: 96 MMOL/L (ref 94–109)
CO2 SERPL-SCNC: 19 MMOL/L (ref 20–32)
COPATH REPORT: NORMAL
CREAT SERPL-MCNC: 6.65 MG/DL (ref 0.66–1.25)
ERYTHROCYTE [DISTWIDTH] IN BLOOD BY AUTOMATED COUNT: 16.9 % (ref 10–15)
GFR SERPL CREATININE-BSD FRML MDRD: 8 ML/MIN/1.7M2
GLUCOSE BLDC GLUCOMTR-MCNC: 227 MG/DL (ref 70–99)
GLUCOSE BLDC GLUCOMTR-MCNC: 229 MG/DL (ref 70–99)
GLUCOSE BLDC GLUCOMTR-MCNC: 290 MG/DL (ref 70–99)
GLUCOSE BLDC GLUCOMTR-MCNC: 292 MG/DL (ref 70–99)
GLUCOSE SERPL-MCNC: 229 MG/DL (ref 70–99)
HCT VFR BLD AUTO: 31.3 % (ref 40–53)
HGB BLD-MCNC: 10.3 G/DL (ref 13.3–17.7)
INR PPP: 1.04 (ref 0.86–1.14)
LACTATE BLD-SCNC: 1.4 MMOL/L (ref 0.4–1.9)
Lab: ABNORMAL
MCH RBC QN AUTO: 29 PG (ref 26.5–33)
MCHC RBC AUTO-ENTMCNC: 32.9 G/DL (ref 31.5–36.5)
MCV RBC AUTO: 88 FL (ref 78–100)
PLATELET # BLD AUTO: 239 10E9/L (ref 150–450)
POTASSIUM SERPL-SCNC: 5.2 MMOL/L (ref 3.4–5.3)
RBC # BLD AUTO: 3.55 10E12/L (ref 4.4–5.9)
SODIUM SERPL-SCNC: 131 MMOL/L (ref 133–144)
SPECIMEN SOURCE: ABNORMAL
TACROLIMUS BLD-MCNC: 5 UG/L (ref 5–15)
TME LAST DOSE: NORMAL H
WBC # BLD AUTO: 17.6 10E9/L (ref 4–11)

## 2018-06-21 PROCEDURE — A9270 NON-COVERED ITEM OR SERVICE: HCPCS | Mod: GY | Performed by: THORACIC SURGERY (CARDIOTHORACIC VASCULAR SURGERY)

## 2018-06-21 PROCEDURE — 85610 PROTHROMBIN TIME: CPT | Performed by: THORACIC SURGERY (CARDIOTHORACIC VASCULAR SURGERY)

## 2018-06-21 PROCEDURE — 80048 BASIC METABOLIC PNL TOTAL CA: CPT | Performed by: THORACIC SURGERY (CARDIOTHORACIC VASCULAR SURGERY)

## 2018-06-21 PROCEDURE — A9270 NON-COVERED ITEM OR SERVICE: HCPCS | Mod: GY | Performed by: PHYSICIAN ASSISTANT

## 2018-06-21 PROCEDURE — 25000125 ZZHC RX 250: Performed by: INTERNAL MEDICINE

## 2018-06-21 PROCEDURE — 80197 ASSAY OF TACROLIMUS: CPT | Performed by: INTERNAL MEDICINE

## 2018-06-21 PROCEDURE — A9270 NON-COVERED ITEM OR SERVICE: HCPCS | Mod: GY | Performed by: STUDENT IN AN ORGANIZED HEALTH CARE EDUCATION/TRAINING PROGRAM

## 2018-06-21 PROCEDURE — 25000132 ZZH RX MED GY IP 250 OP 250 PS 637: Performed by: THORACIC SURGERY (CARDIOTHORACIC VASCULAR SURGERY)

## 2018-06-21 PROCEDURE — 25000132 ZZH RX MED GY IP 250 OP 250 PS 637: Performed by: STUDENT IN AN ORGANIZED HEALTH CARE EDUCATION/TRAINING PROGRAM

## 2018-06-21 PROCEDURE — 21400006 ZZH R&B CCU INTERMEDIATE UMMC

## 2018-06-21 PROCEDURE — 25000132 ZZH RX MED GY IP 250 OP 250 PS 637: Mod: GY | Performed by: PHYSICIAN ASSISTANT

## 2018-06-21 PROCEDURE — 00000146 ZZHCL STATISTIC GLUCOSE BY METER IP

## 2018-06-21 PROCEDURE — 85027 COMPLETE CBC AUTOMATED: CPT | Performed by: THORACIC SURGERY (CARDIOTHORACIC VASCULAR SURGERY)

## 2018-06-21 PROCEDURE — 90937 HEMODIALYSIS REPEATED EVAL: CPT

## 2018-06-21 PROCEDURE — 85730 THROMBOPLASTIN TIME PARTIAL: CPT | Performed by: THORACIC SURGERY (CARDIOTHORACIC VASCULAR SURGERY)

## 2018-06-21 PROCEDURE — 63400005 ZZH RX 634: Performed by: CLINICAL NURSE SPECIALIST

## 2018-06-21 PROCEDURE — 83605 ASSAY OF LACTIC ACID: CPT | Performed by: THORACIC SURGERY (CARDIOTHORACIC VASCULAR SURGERY)

## 2018-06-21 PROCEDURE — 25000131 ZZH RX MED GY IP 250 OP 636 PS 637: Performed by: STUDENT IN AN ORGANIZED HEALTH CARE EDUCATION/TRAINING PROGRAM

## 2018-06-21 PROCEDURE — 25000128 H RX IP 250 OP 636: Performed by: STUDENT IN AN ORGANIZED HEALTH CARE EDUCATION/TRAINING PROGRAM

## 2018-06-21 PROCEDURE — 25000128 H RX IP 250 OP 636: Performed by: CLINICAL NURSE SPECIALIST

## 2018-06-21 PROCEDURE — 25000132 ZZH RX MED GY IP 250 OP 250 PS 637: Mod: GY | Performed by: THORACIC SURGERY (CARDIOTHORACIC VASCULAR SURGERY)

## 2018-06-21 PROCEDURE — 36592 COLLECT BLOOD FROM PICC: CPT | Performed by: THORACIC SURGERY (CARDIOTHORACIC VASCULAR SURGERY)

## 2018-06-21 RX ORDER — HYDRALAZINE HYDROCHLORIDE 50 MG/1
50 TABLET, FILM COATED ORAL 4 TIMES DAILY
Status: DISCONTINUED | OUTPATIENT
Start: 2018-06-21 | End: 2018-06-23

## 2018-06-21 RX ADMIN — HEPARIN SODIUM 5000 UNITS: 5000 INJECTION, SOLUTION INTRAVENOUS; SUBCUTANEOUS at 04:43

## 2018-06-21 RX ADMIN — HEPARIN SODIUM 5000 UNITS: 5000 INJECTION, SOLUTION INTRAVENOUS; SUBCUTANEOUS at 20:01

## 2018-06-21 RX ADMIN — MYCOPHENOLATE MOFETIL 1500 MG: 250 CAPSULE ORAL at 17:57

## 2018-06-21 RX ADMIN — PANTOPRAZOLE SODIUM 40 MG: 40 TABLET, DELAYED RELEASE ORAL at 08:15

## 2018-06-21 RX ADMIN — PRAVASTATIN SODIUM 40 MG: 40 TABLET ORAL at 19:57

## 2018-06-21 RX ADMIN — TERBUTALINE SULFATE 5 MG: 5 TABLET ORAL at 20:01

## 2018-06-21 RX ADMIN — VALGANCICLOVIR HYDROCHLORIDE 450 MG: 50 POWDER, FOR SOLUTION ORAL at 08:14

## 2018-06-21 RX ADMIN — SODIUM CHLORIDE 250 ML: 9 INJECTION, SOLUTION INTRAVENOUS at 09:28

## 2018-06-21 RX ADMIN — ASPIRIN 81 MG: 81 TABLET, COATED ORAL at 08:16

## 2018-06-21 RX ADMIN — HYDRALAZINE HYDROCHLORIDE 50 MG: 50 TABLET ORAL at 19:57

## 2018-06-21 RX ADMIN — Medication 1 HALF-TAB: at 20:07

## 2018-06-21 RX ADMIN — NYSTATIN 1000000 UNITS: 100000 SUSPENSION ORAL at 19:56

## 2018-06-21 RX ADMIN — PREDNISONE 30 MG: 20 TABLET ORAL at 19:56

## 2018-06-21 RX ADMIN — PREDNISONE 30 MG: 20 TABLET ORAL at 08:13

## 2018-06-21 RX ADMIN — HYDRALAZINE HYDROCHLORIDE 50 MG: 50 TABLET ORAL at 16:56

## 2018-06-21 RX ADMIN — NYSTATIN 1000000 UNITS: 100000 SUSPENSION ORAL at 08:14

## 2018-06-21 RX ADMIN — SENNOSIDES AND DOCUSATE SODIUM 1 TABLET: 8.6; 5 TABLET ORAL at 19:57

## 2018-06-21 RX ADMIN — HYDRALAZINE HYDROCHLORIDE 10 MG: 20 INJECTION INTRAMUSCULAR; INTRAVENOUS at 04:42

## 2018-06-21 RX ADMIN — NYSTATIN 1000000 UNITS: 100000 SUSPENSION ORAL at 16:58

## 2018-06-21 RX ADMIN — MYCOPHENOLATE MOFETIL 1500 MG: 250 CAPSULE ORAL at 08:12

## 2018-06-21 RX ADMIN — SODIUM CHLORIDE 300 ML: 9 INJECTION, SOLUTION INTRAVENOUS at 09:27

## 2018-06-21 RX ADMIN — EPOETIN ALFA 4000 UNITS: 10000 SOLUTION INTRAVENOUS; SUBCUTANEOUS at 10:21

## 2018-06-21 RX ADMIN — Medication 1 CAPSULE: at 16:56

## 2018-06-21 RX ADMIN — TERBUTALINE SULFATE 5 MG: 5 TABLET ORAL at 16:56

## 2018-06-21 RX ADMIN — GLIPIZIDE 2.5 MG: 2.5 TABLET, FILM COATED, EXTENDED RELEASE ORAL at 08:17

## 2018-06-21 RX ADMIN — TERBUTALINE SULFATE 5 MG: 5 TABLET ORAL at 04:43

## 2018-06-21 RX ADMIN — HEPARIN SODIUM 5000 UNITS: 5000 INJECTION, SOLUTION INTRAVENOUS; SUBCUTANEOUS at 16:56

## 2018-06-21 RX ADMIN — HYDRALAZINE HYDROCHLORIDE 25 MG: 25 TABLET ORAL at 08:16

## 2018-06-21 RX ADMIN — SENNOSIDES AND DOCUSATE SODIUM 1 TABLET: 8.6; 5 TABLET ORAL at 08:16

## 2018-06-21 NOTE — PROGRESS NOTES
"Nephrology Progress Note  06/21/2018         Murray Nicholson is a 63 year old year old male with PMH of HTN, DMII, functionally bicuspid aortic valve, CHF/CM (EF 10-15%), ESRD, admitted with worsening dyspnea/SOB, now S/P heart tx 6/14 and started on CRRT, Nephrology managing HD/CRRT.      Interval History  Mr Nicholson is recovering well from heart tx last week, seen on HD today, UF goal increased from 2L to 3L, tolerating well.  No major changes in HD plan, doing very well from surgery standpoint, will set up follow-up in transplant nephrology clinic on discharge.       Assessment & Recommendations:   ESRD: due to DM, on PD since Aug 2017, changed to HD 1/18, now TTS, at [facility] under care Dr Mcpherson, [facility] tunnel, EDW 75.5-76 kg, 3.5 hrs.                          -HD today, challenging EDW and now below his outpt EDW, increased UF to 3L today without issue.        Volume-EDW 76kg, down to 73.6 today with reasonable BP's and no edema on exam, increased UF goal from 2 to 3L without issue, will continue to challenge while he is inpt.       Electrolytes/pH-K 5.2, bicarb 19 prior to HD, no issues.        BMD- Ca 9.2, alb 3.3 last check, phos 5.2 last check, no acute issues.      Heart Tx-On mycophenolate and methylpred.      Anemia-Cont venofer 100 mg Qtues, cont Epo 4000 u with dialysis now that he is on iHD.        Nutrition-Taking PO.      Discussed with Dr Tena      Recommendations were communicated to team via verbal communication.         Moris Sevilla  Clinical Nurse Specialist  [phone]    Review of Systems:   I reviewed the following systems:  ROS not done due to vent/sedation.     Physical Exam:   I/O last 3 completed shifts:  In: 240 [P.O.:240]  Out: 3000 [Other:3000]   /79  Pulse 105  Temp 98.1  F (36.7  C) (Oral)  Resp 18  Ht 1.727 m (5' 8\")  Wt 73.6 kg (162 lb 4.1 oz)  SpO2 100%  BMI 24.67 kg/m2     GENERAL APPEARANCE: Lying in bed, in no distress  EYES:  No scleral icterus, pupils " equal  HENT: mouth without ulcers or lesions  PULM: lungs clear to auscultation, equal air movement, no cyanosis or clubbing  CV: regular rhythm, normal rate, no rub     -JVP not elevated     -edema trace LE.  GI: soft, non-tender, not distended, bowel sounds are +  MS: no evidence of inflammation in joints, no muscle tenderness  NEURO: Alert and oriented  Lines-tunneled HD line    Labs:   All labs reviewed by me  Electrolytes/Renal -   Recent Labs   Lab Test  06/21/18   0605  06/20/18   0433  06/19/18   0657  06/18/18   0827  06/17/18   1630   NA  131*  132*  128*  130*  130*   POTASSIUM  5.2  4.8  5.1  5.0  4.6   CHLORIDE  96  97  94  94  94   CO2  19*  20  18*  22  23   BUN  71*  40*  68*  52*  34*   CR  6.65*  4.77*  6.50*  5.01*  3.48*   GLC  229*  263*  170*  113*  168*   TRINI  9.2  9.1  9.0  9.0  9.6   MAG   --    --   2.2  2.2  2.2   PHOS   --    --   5.2*  4.0  4.3       CBC -   Recent Labs   Lab Test  06/21/18   0605  06/20/18   0433  06/19/18   0657   WBC  17.6*  10.5  13.3*   HGB  10.3*  9.8*  9.5*   PLT  239  193  159       LFTs -   Recent Labs   Lab Test  06/18/18   0827  06/17/18   0433  06/16/18   0342   ALKPHOS  114  105  85   BILITOTAL  0.8  1.1  2.0*   ALT  34  33  30   AST  56*  92*  111*   PROTTOTAL  6.9  7.2  6.2*   ALBUMIN  3.3*  3.5  3.0*       Iron Panel -   Recent Labs   Lab Test  05/08/18   0954  07/19/17   1306  07/05/17   1204   IRON  58  46  26*   IRONSAT  27  18  12*   ALBERTINA  621*  369  542*           Current Medications:    aspirin  81 mg Oral Daily     glipiZIDE  2.5 mg Oral Daily with breakfast     heparin  5,000 Units Subcutaneous Q8H     hydrALAZINE  50 mg Oral 4x Daily     insulin aspart  1-7 Units Subcutaneous TID AC     insulin aspart  1-5 Units Subcutaneous At Bedtime     mycophenolate  1,500 mg Oral BID IS     NEPHROCAPS  1 capsule Oral Daily     - MEDICATION INSTRUCTIONS -   Does not apply Once     nystatin  1,000,000 Units Swish & Swallow 4x Daily     pantoprazole  40 mg Oral  QAM     pravastatin  40 mg Oral Daily     predniSONE  30 mg Oral BID    Followed by     [START ON 6/22/2018] predniSONE  25 mg Oral BID    Followed by     [START ON 6/24/2018] predniSONE  20 mg Oral BID     senna-docusate  1 tablet Oral BID     sodium chloride (PF)  3 mL Intracatheter During Hemodialysis (from stock)     sodium chloride (PF)  3 mL Intracatheter During Hemodialysis (from stock)     sodium chloride (PF)  3 mL Intracatheter Q8H     sulfamethoxazole-trimethoprim  1 half-tab Oral QPM     terbutaline  5 mg Oral or Feeding Tube Q8H     valGANciclovir  450 mg Oral or Feeding Tube Once per day on Mon Thu       IV fluid REPLACEMENT ONLY       IV fluid REPLACEMENT ONLY       dextrose 5% and 0.45% NaCl 10 mL/hr at 06/15/18 0900     Reason beta blocker order not selected       tacrolimus (Prograf) infusion ADULT 75 mcg/hr (06/21/18 0116)

## 2018-06-21 NOTE — PROGRESS NOTES
HEMODIALYSIS TREATMENT NOTE    Date: 6/21/2018  Time: 1:08 PM    Data:  Pre Wt: 76.3kg  Desired Wt: 73.3kg   Post Wt: 73.6kg  Weight change -2.7  Ultrafiltration - Post Run Net Total Removed (mL): (P) 3000 mL  Ultrafiltration - Post Run Net Total Gain (mL): (P) 0 mL  Vascular Access Status: (P) Yes, secured and visible  Dialyzer Rinse: (P) Clear  Total Blood Volume Processed: (P) 74.2  Total Dialysis (Treatment) Time:  3.5 hours    Lab:   No    Interventions:  Patient dialyzed 3.5 hours via CVC with blood flow rate of 400ml/min, CVC dressing changed and site looks good. UF 3kg net today. Hand off report given to primary RN.     Assessment:  Patient tolerated HD run well.      Plan:    Next HD run per renal  team.

## 2018-06-21 NOTE — PROVIDER NOTIFICATION
MD notified via web-based paging regarding patient's BPs. Hypertensive at 162/106. PRN IV  Hydralazine 10mg given, recheck 168/101 and then 177/113. Awaiting any new orders.

## 2018-06-21 NOTE — PLAN OF CARE
Problem: Patient Care Overview  Goal: Plan of Care/Patient Progress Review  RN:  1.Pt will remain hemodynamically stable.  2.BG WNL.   OT/CR 6C: Cancel.  Pt gone to dialysis all AM,  Attempted to see pt x 2 this PM; however, pt otherwise occupied on each attempt and requests for therapist to return tomorrow.  Pt does state he prefers to do OP CR at [facility].  Will place order.

## 2018-06-21 NOTE — PLAN OF CARE
Problem: Patient Care Overview  Goal: Plan of Care/Patient Progress Review  RN:  1.Pt will remain hemodynamically stable.  2.BG WNL.   Outcome: No Change  Neuro: A&Ox4. No deficits.   Cardiac: ST HR 100s, Hypertensive, PRN Hydralazine given x2 without effect. MD aware, no new orders. Complaints of headache relieved with Tylenol and Tramadol.   Resp: Sating high 90s on RA. Small, stable pneumothorax in peritoneum, monitoring with daily xrays.  GI/: Anuric on HD (plan for HD today), BM yesterday. Scheduled bowels meds given.  Diet/Appetite: Tolerating regular diet. Denies nausea. Good appetite.  Skin: No new deficits.  Access: L) DL PICC, infusing tacro gtt at 75mcg/hr. R) CVC for HD.   Drains: none  Activity: Up ad edith  Pain: Only complaints of headache relieved by PRN Tylenol and Tramadol.    Will continue with plan of care and notify MD of any changes.

## 2018-06-21 NOTE — PROGRESS NOTES
CVTS Daily Note  6/21/2018  Attending: Rony Caputo,*    S:   No overnight events.  Biopsy result 1R, will take TPW out tomorrow.   HD run this AM without issues.     Pt seen at bedside resting comfortably.    No acute complaints.      Denies F/C/Sweats.  No CP, SOB, or calf pain.    Tolerating diet.  + BM.  + Flatus.    Ambulated well without assistance.    Pain level tolerable. Plan as per Neuro section below.     O:   Vitals:    06/21/18 1230 06/21/18 1245 06/21/18 1257 06/21/18 1308   BP: (!) 123/93 107/69 125/84 117/79   BP Location:       Cuff Size:       Pulse:       Resp:   18 18   Temp:   98.1  F (36.7  C)    TempSrc:   Oral    SpO2:       Weight:   73.6 kg (162 lb 4.1 oz)    Height:         Vitals:    06/20/18 0549 06/21/18 0600 06/21/18 1257   Weight: 74.9 kg (165 lb 1.6 oz) 76.3 kg (168 lb 4.8 oz) 73.6 kg (162 lb 4.1 oz)         Intake/Output Summary (Last 24 hours) at 06/21/18 1422  Last data filed at 06/21/18 1257   Gross per 24 hour   Intake              240 ml   Output             3000 ml   Net            -2760 ml       MAPs: 120 - 138  Gen: AAO x 3, pleasant, NAD  CV: RRR, S1S2 normal, no murmurs, rubs, or gallops.   Pulm: CTA, no rhonchi or wheezes  Abd: soft, non-tender, no guarding  Ext: no peripheral edema  Incision: clean, dry, intact, no erythema  Chest Tube sites: dressings clean and dry    Labs:  BMP    Recent Labs  Lab 06/21/18  0605 06/20/18  0433 06/19/18  0657 06/18/18  0827   * 132* 128* 130*   POTASSIUM 5.2 4.8 5.1 5.0   CHLORIDE 96 97 94 94   TRINI 9.2 9.1 9.0 9.0   CO2 19* 20 18* 22   BUN 71* 40* 68* 52*   CR 6.65* 4.77* 6.50* 5.01*   * 263* 170* 113*     CBC    Recent Labs  Lab 06/21/18  0605 06/20/18  0433 06/19/18  0657 06/18/18  0827   WBC 17.6* 10.5 13.3* 14.7*   RBC 3.55* 3.39* 3.22* 3.28*   HGB 10.3* 9.8* 9.5* 9.5*   HCT 31.3* 30.7* 28.6* 29.7*   MCV 88 91 89 91   MCH 29.0 28.9 29.5 29.0   MCHC 32.9 31.9 33.2 32.0   RDW 16.9* 16.8* 16.9* 17.2*     193 159 143*     INR    Recent Labs  Lab 06/21/18  0605 06/20/18  0433 06/19/18  0657 06/18/18  0827   INR 1.04 1.04 1.01 1.02      Hepatic Panel   Lab Results   Component Value Date    AST 56 06/18/2018     Lab Results   Component Value Date    ALT 34 06/18/2018     Lab Results   Component Value Date    ALBUMIN 3.3 06/18/2018     GLUCOSE:     Recent Labs  Lab 06/21/18  1217 06/21/18  0605 06/21/18  0446 06/20/18  2148 06/20/18  1831 06/20/18  1357 06/20/18  0433 06/20/18  0212  06/19/18  0657  06/18/18  0827  06/17/18  1630  06/17/18  0433   GLC  --  229*  --   --   --   --  263*  --   --  170*  --  113*  --  168*  --  172*   *  --  227* 150* 194* 269*  --  277*  < >  --   < >  --   < >  --   < >  --    < > = values in this interval not displayed.      Imaging:  reviewed recent imaging      A/P:    Murray Nicholson is a 63 year old male CAD, ICM (EF of 15-20%), ESRD on HD, bicuspid aortic valve with AI, severe MR, and proximal AAA who was admitted for decompensated heart failure 4/16/18 now s/p heart transplant on 6/14/18. Extubated uneventfully, weaned off of the nitric oxide easily.  6/20: RHC and BX, 1R cellular rejection, no antibody rejection.       Neuro:   - Neuro intact  - Scheduled Tylenol and PRN oxycodone   - Leg restlessness complaints, but slowly improving.       CV:   - No arrhythmia, HD stable.  - ASA 81 mg  - HTN; Hydralazine 25 mg q 6 hrs (patient concerned this is causing leg restlessness)   - Keep TPW capped, remove after results of 1st biopsy    - First RHC and Biopsy scheduled for 6/21   - Tacro 5.0 on IV tacro gtt.       Pulm:   - Pulm toilet, IS, activity and deep breathing       ID:   - WBC 17.6, afebrile, no signs or symptoms of infection, on steroids  - Immunosuppression per Cardiology       GI / FEN:  - 2 gr Na Reg diet, bowel regimen  - Large pneumoperitoneum on Abd xray, continue to monitor. Asymptomatic.       Renal / :   - Creatinine 6.65, adequate UOP.   - Nephrology following,  Dialysis TTS      Heme:   - Hgb 8-9's, Plts WNL      Endo:   - Glipizide 2.5 mg q AM (no renal dosing needed)   - Med sliding scale insulin       PPX:   - Protonix      Anticoagulation:   - Hep 5000 U q 8 hrs for DVT prophylaxis       Dispo:   - 6C since 6/17  - NeedsTPW wire removal and Tacro levels stable       Discussed with CVTS Fellow   Staff surgeons have been informed of changes through both  verbal and written communication.      Anmol Deal PA-C  Cardiothoracic Surgery  Pager [phone]    2:22 PM   June 21, 2018

## 2018-06-22 ENCOUNTER — APPOINTMENT (OUTPATIENT)
Dept: ULTRASOUND IMAGING | Facility: CLINIC | Age: 63
DRG: 001 | End: 2018-06-22
Attending: INTERNAL MEDICINE
Payer: COMMERCIAL

## 2018-06-22 LAB
ANION GAP SERPL CALCULATED.3IONS-SCNC: 16 MMOL/L (ref 3–14)
APTT PPP: 33 SEC (ref 22–37)
BUN SERPL-MCNC: 50 MG/DL (ref 7–30)
CALCIUM SERPL-MCNC: 9.2 MG/DL (ref 8.5–10.1)
CHLORIDE SERPL-SCNC: 94 MMOL/L (ref 94–109)
CO2 SERPL-SCNC: 22 MMOL/L (ref 20–32)
CREAT SERPL-MCNC: 4.71 MG/DL (ref 0.66–1.25)
ERYTHROCYTE [DISTWIDTH] IN BLOOD BY AUTOMATED COUNT: 17.2 % (ref 10–15)
GFR SERPL CREATININE-BSD FRML MDRD: 13 ML/MIN/1.7M2
GLUCOSE BLDC GLUCOMTR-MCNC: 254 MG/DL (ref 70–99)
GLUCOSE BLDC GLUCOMTR-MCNC: 308 MG/DL (ref 70–99)
GLUCOSE BLDC GLUCOMTR-MCNC: 309 MG/DL (ref 70–99)
GLUCOSE BLDC GLUCOMTR-MCNC: 335 MG/DL (ref 70–99)
GLUCOSE BLDC GLUCOMTR-MCNC: 377 MG/DL (ref 70–99)
GLUCOSE BLDC GLUCOMTR-MCNC: 448 MG/DL (ref 70–99)
GLUCOSE SERPL-MCNC: 440 MG/DL (ref 70–99)
HCT VFR BLD AUTO: 33.4 % (ref 40–53)
HGB BLD-MCNC: 10.6 G/DL (ref 13.3–17.7)
INR PPP: 1.09 (ref 0.86–1.14)
LACTATE BLD-SCNC: 1.2 MMOL/L (ref 0.4–1.9)
MCH RBC QN AUTO: 28.6 PG (ref 26.5–33)
MCHC RBC AUTO-ENTMCNC: 31.7 G/DL (ref 31.5–36.5)
MCV RBC AUTO: 90 FL (ref 78–100)
PLATELET # BLD AUTO: 262 10E9/L (ref 150–450)
POTASSIUM SERPL-SCNC: 4.4 MMOL/L (ref 3.4–5.3)
RBC # BLD AUTO: 3.7 10E12/L (ref 4.4–5.9)
SODIUM SERPL-SCNC: 132 MMOL/L (ref 133–144)
TACROLIMUS BLD-MCNC: 8.5 UG/L (ref 5–15)
TME LAST DOSE: NORMAL H
WBC # BLD AUTO: 14.3 10E9/L (ref 4–11)

## 2018-06-22 PROCEDURE — 25000131 ZZH RX MED GY IP 250 OP 636 PS 637: Mod: GY | Performed by: STUDENT IN AN ORGANIZED HEALTH CARE EDUCATION/TRAINING PROGRAM

## 2018-06-22 PROCEDURE — 25000132 ZZH RX MED GY IP 250 OP 250 PS 637: Mod: GY | Performed by: PHYSICIAN ASSISTANT

## 2018-06-22 PROCEDURE — 00000146 ZZHCL STATISTIC GLUCOSE BY METER IP

## 2018-06-22 PROCEDURE — 25000128 H RX IP 250 OP 636: Performed by: STUDENT IN AN ORGANIZED HEALTH CARE EDUCATION/TRAINING PROGRAM

## 2018-06-22 PROCEDURE — 25000132 ZZH RX MED GY IP 250 OP 250 PS 637: Mod: GY | Performed by: THORACIC SURGERY (CARDIOTHORACIC VASCULAR SURGERY)

## 2018-06-22 PROCEDURE — 85730 THROMBOPLASTIN TIME PARTIAL: CPT | Performed by: THORACIC SURGERY (CARDIOTHORACIC VASCULAR SURGERY)

## 2018-06-22 PROCEDURE — 25000132 ZZH RX MED GY IP 250 OP 250 PS 637: Mod: GY | Performed by: INTERNAL MEDICINE

## 2018-06-22 PROCEDURE — 99232 SBSQ HOSP IP/OBS MODERATE 35: CPT | Mod: GC | Performed by: INTERNAL MEDICINE

## 2018-06-22 PROCEDURE — A9270 NON-COVERED ITEM OR SERVICE: HCPCS | Mod: GY | Performed by: THORACIC SURGERY (CARDIOTHORACIC VASCULAR SURGERY)

## 2018-06-22 PROCEDURE — 85027 COMPLETE CBC AUTOMATED: CPT | Performed by: THORACIC SURGERY (CARDIOTHORACIC VASCULAR SURGERY)

## 2018-06-22 PROCEDURE — A9270 NON-COVERED ITEM OR SERVICE: HCPCS | Mod: GY | Performed by: PHYSICIAN ASSISTANT

## 2018-06-22 PROCEDURE — 25000131 ZZH RX MED GY IP 250 OP 636 PS 637: Mod: GY | Performed by: INTERNAL MEDICINE

## 2018-06-22 PROCEDURE — 36415 COLL VENOUS BLD VENIPUNCTURE: CPT | Performed by: THORACIC SURGERY (CARDIOTHORACIC VASCULAR SURGERY)

## 2018-06-22 PROCEDURE — 25000132 ZZH RX MED GY IP 250 OP 250 PS 637: Mod: GY | Performed by: STUDENT IN AN ORGANIZED HEALTH CARE EDUCATION/TRAINING PROGRAM

## 2018-06-22 PROCEDURE — 85610 PROTHROMBIN TIME: CPT | Performed by: THORACIC SURGERY (CARDIOTHORACIC VASCULAR SURGERY)

## 2018-06-22 PROCEDURE — A9270 NON-COVERED ITEM OR SERVICE: HCPCS | Mod: GY | Performed by: STUDENT IN AN ORGANIZED HEALTH CARE EDUCATION/TRAINING PROGRAM

## 2018-06-22 PROCEDURE — 80197 ASSAY OF TACROLIMUS: CPT | Performed by: INTERNAL MEDICINE

## 2018-06-22 PROCEDURE — A9270 NON-COVERED ITEM OR SERVICE: HCPCS | Mod: GY | Performed by: INTERNAL MEDICINE

## 2018-06-22 PROCEDURE — 93970 EXTREMITY STUDY: CPT

## 2018-06-22 PROCEDURE — 36592 COLLECT BLOOD FROM PICC: CPT | Performed by: INTERNAL MEDICINE

## 2018-06-22 PROCEDURE — 21400006 ZZH R&B CCU INTERMEDIATE UMMC

## 2018-06-22 PROCEDURE — 83605 ASSAY OF LACTIC ACID: CPT | Performed by: INTERNAL MEDICINE

## 2018-06-22 PROCEDURE — 80048 BASIC METABOLIC PNL TOTAL CA: CPT | Performed by: THORACIC SURGERY (CARDIOTHORACIC VASCULAR SURGERY)

## 2018-06-22 PROCEDURE — 25000125 ZZHC RX 250: Performed by: INTERNAL MEDICINE

## 2018-06-22 RX ORDER — GLIPIZIDE 5 MG/1
5 TABLET, FILM COATED, EXTENDED RELEASE ORAL
Status: DISCONTINUED | OUTPATIENT
Start: 2018-06-22 | End: 2018-06-25

## 2018-06-22 RX ORDER — TACROLIMUS 1 MG/1
1 CAPSULE ORAL
Status: DISCONTINUED | OUTPATIENT
Start: 2018-06-22 | End: 2018-06-22

## 2018-06-22 RX ORDER — TACROLIMUS 1 MG/1
3 CAPSULE ORAL
Status: DISCONTINUED | OUTPATIENT
Start: 2018-06-23 | End: 2018-06-28

## 2018-06-22 RX ADMIN — MYCOPHENOLATE MOFETIL 1500 MG: 250 CAPSULE ORAL at 09:36

## 2018-06-22 RX ADMIN — NYSTATIN 1000000 UNITS: 100000 SUSPENSION ORAL at 13:15

## 2018-06-22 RX ADMIN — TERBUTALINE SULFATE 5 MG: 5 TABLET ORAL at 20:46

## 2018-06-22 RX ADMIN — NYSTATIN 1000000 UNITS: 100000 SUSPENSION ORAL at 18:13

## 2018-06-22 RX ADMIN — APIXABAN 2.5 MG: 2.5 TABLET, FILM COATED ORAL at 20:50

## 2018-06-22 RX ADMIN — ASPIRIN 81 MG: 81 TABLET, COATED ORAL at 09:39

## 2018-06-22 RX ADMIN — TACROLIMUS 3.5 MG: 1 CAPSULE ORAL at 18:12

## 2018-06-22 RX ADMIN — MYCOPHENOLATE MOFETIL 1500 MG: 250 CAPSULE ORAL at 18:13

## 2018-06-22 RX ADMIN — SENNOSIDES AND DOCUSATE SODIUM 1 TABLET: 8.6; 5 TABLET ORAL at 20:50

## 2018-06-22 RX ADMIN — Medication 1 HALF-TAB: at 20:46

## 2018-06-22 RX ADMIN — HYDRALAZINE HYDROCHLORIDE 10 MG: 20 INJECTION INTRAMUSCULAR; INTRAVENOUS at 22:29

## 2018-06-22 RX ADMIN — PREDNISONE 25 MG: 5 TABLET ORAL at 09:36

## 2018-06-22 RX ADMIN — SENNOSIDES AND DOCUSATE SODIUM 1 TABLET: 8.6; 5 TABLET ORAL at 09:40

## 2018-06-22 RX ADMIN — NYSTATIN 1000000 UNITS: 100000 SUSPENSION ORAL at 09:38

## 2018-06-22 RX ADMIN — HYDRALAZINE HYDROCHLORIDE 50 MG: 50 TABLET ORAL at 13:15

## 2018-06-22 RX ADMIN — TERBUTALINE SULFATE 5 MG: 5 TABLET ORAL at 13:15

## 2018-06-22 RX ADMIN — HYDRALAZINE HYDROCHLORIDE 50 MG: 50 TABLET ORAL at 09:56

## 2018-06-22 RX ADMIN — HYDRALAZINE HYDROCHLORIDE 50 MG: 50 TABLET ORAL at 16:12

## 2018-06-22 RX ADMIN — GLIPIZIDE 5 MG: 5 TABLET, FILM COATED, EXTENDED RELEASE ORAL at 09:35

## 2018-06-22 RX ADMIN — TERBUTALINE SULFATE 5 MG: 5 TABLET ORAL at 05:33

## 2018-06-22 RX ADMIN — ACETAMINOPHEN 650 MG: 325 TABLET, FILM COATED ORAL at 18:34

## 2018-06-22 RX ADMIN — TRAMADOL HYDROCHLORIDE 50 MG: 50 TABLET, COATED ORAL at 22:29

## 2018-06-22 RX ADMIN — NYSTATIN 1000000 UNITS: 100000 SUSPENSION ORAL at 20:46

## 2018-06-22 RX ADMIN — PRAVASTATIN SODIUM 40 MG: 40 TABLET ORAL at 20:50

## 2018-06-22 RX ADMIN — PANTOPRAZOLE SODIUM 40 MG: 40 TABLET, DELAYED RELEASE ORAL at 09:39

## 2018-06-22 RX ADMIN — HYDRALAZINE HYDROCHLORIDE 10 MG: 20 INJECTION INTRAMUSCULAR; INTRAVENOUS at 18:13

## 2018-06-22 RX ADMIN — Medication 1 CAPSULE: at 09:40

## 2018-06-22 RX ADMIN — HYDRALAZINE HYDROCHLORIDE 50 MG: 50 TABLET ORAL at 20:50

## 2018-06-22 RX ADMIN — PREDNISONE 25 MG: 5 TABLET ORAL at 20:51

## 2018-06-22 RX ADMIN — HEPARIN SODIUM 5000 UNITS: 5000 INJECTION, SOLUTION INTRAVENOUS; SUBCUTANEOUS at 05:33

## 2018-06-22 RX ADMIN — ACETAMINOPHEN 650 MG: 325 TABLET, FILM COATED ORAL at 22:29

## 2018-06-22 ASSESSMENT — PAIN DESCRIPTION - DESCRIPTORS: DESCRIPTORS: HEADACHE

## 2018-06-22 NOTE — DISCHARGE SUMMARY
Dialysis Discharge Summary Brief    [facility]  Division of Nephrology  Nephrology Discharge Dialysis Orders  Ph: [phone]  Fax: [phone]    Murray Nicholson  MRN: [id number]  YOB: 1955    [facility] Dialysis Unit: [facility]  Primary Nephrologist: Dr Mcpherson    Date of Admission: 4/16/2018  Date of Discharge: 6/22/18  Discharge Diagnosis: S/P Heart tx 6/14/18    Mr Nicholson is a 63 yom with hx of HTN, DM2, ESRD due to DM2/CHF who had heart tx on 6/14/18.  Has progressed well, is now actually below his EDW (possibly due to better cardiac fx).  ~75kg today, plan to run tomorrow 6/23 and then D/C to restart outpt HD on 6/26.  No major changes other than challenging EDW, plans to pursue kidney tx (heart donor's kidneys were not an option otherwise would likely have had dual heart/kidney).  Pager # below, please page with any questions.       [x] Resume all previous dialysis orders with exception as noted below    New Orders (if not applicable put NA):  Estimated Dry Weight 74.7 today, challenging (was 76kg prior to heart tx)   Dialysis Duration    Dialysis Access    Antibiotics (dose per dialysis, end date)            Labs to be drawn at dialysis    Other major changes to dialysis prescription (e.g. Dialysate bath, heparin, blood flow rate, etc)      Medication changes (also fax the unit a copy of the discharge summary)              Name of provider completing this form: VERONICA Leavitt [phone]

## 2018-06-22 NOTE — PROGRESS NOTES
"CLINICAL NUTRITION SERVICES - REASSESSMENT NOTE     Nutrition Prescription    RECOMMENDATIONS FOR MDs/PROVIDERS TO ORDER:  Consider Endocrine consult if BG remains elevated. BG was 422 this morning (6/22).    Malnutrition Status:    Non-severe malnutrition in the context of chronic illness    Recommendations already ordered by Registered Dietitian (RD):  Discontinued free water flushes.    Future/Additional Recommendations:  1. Continue regular diet as ordered to help encourage oral intake. Monitor potential need for high consistent carbohydrate diet restriction. Also, monitor possible need for K+ and phos restrictions. More than one diet restriction may be ordered as \"Combination Diet.\"   2. Continue nephrocaps as ordered since pt continues to be on dialysis.   3.  Pt's 25 OH vitamin D total lab was 23 on 4/11. Pt's vitamin D deficiency lab was 27 on 5/5. No need for routine calcium/vitamin D supplementation post-op Tx. Per nephrology, pt is getting calcium during dialysis and calcitriol during runs also.    4. Consider checking vitamin B12 status.     EVALUATION OF THE PROGRESS TOWARD GOALS   Diet: Currently on a regular diet order with thin liquids since 6/20. Extubated on 6/16 and diet was advanced to a full liquid diet on 6/19.   Intake: Tolerating diet. Flowsheets indicate pt skipped a meal but then consumed 100% of a meal with a good appetite 6/18, 100% of a meal with a good appetite 6/19, % of meals with a good appetite 6/20, 100% of a meal 6/21, and 100% of meals with a good appetite 6/22. Per pt, his appetite improved after surgery and states he is eating well. He has been creative with ordering from the menu and is mixing/matching certain items for meals.     Nutrition Support: Feeding tube was placed 6/15 and TFs were started. Nepro with goal rate of 50 mL/hr was ordered. TFs were stopped and feeding tube was removed 6/16.       NEW FINDINGS   ASSESSED NUTRITION NEEDS (updated)  Dosing Weight: 74 " kg (based on lowest wt this admission of 73.6 kg on 6/21/18)  * Did not use wt of 69.4 kg on 6/16, unsure if accurate.  Estimated Energy Needs: 2429-3013 kcals/day (30 - 35 kcals/kg)  Justification: Increased needs, pt on dialysis and post-op Tx   Estimated Protein Needs:  grams protein/day (1.3 - 1.8 grams of pro/kg)  Justification: Increased needs, pt on dialysis and post-op Tx  Estimated Fluid Needs: UOP + 500 mL/day or per team, pending fluid status    MALNUTRITION  % Intake: No decreased intake noted  % Weight Loss: Weight loss does not meet criteria. Although, difficult to assess with wt changes and fluid status post-op, and pt on dialysis.   Subcutaneous Fat Loss: Facial region and Lower arm: mild   Muscle Loss: Temporal, Thoracic region (clavicle, acromium bone, deltoid, trapezius, pectoral), Upper arm (bicep, tricep) and Lower arm  (forearm): moderate   Fluid Accumulation/Edema: None noted  Malnutrition Diagnosis: Non-severe malnutrition in the context of chronic illness    Previous Goals   Total average nutritional intakes to meet minimum 30 kcal/kg and 1.5 g/kg PRO (per 75 kg dosing wt)  Evaluation: Improved. Unclear if pt is meeting increased nutrition needs.     Previous Nutrition Diagnosis  Predicted inadequate nutrient intake (kcal/protein) related to increased needs, intubated post-op AEB potential for prolonged NPO pending vent wean plans  Evaluation: Unclear if pt is meeting increased nutrition needs. Changed to new nutrition dx below.    CURRENT NUTRITION DIAGNOSIS  Increased nutrient needs (protein-energy) related to increased needs post-op Tx and with dialysis as evidenced by pt requiring 5261-7242 kcals/day (30 - 35 kcals/kg) and  grams protein/day (1.3 - 1.8 grams of pro/kg).     INTERVENTIONS  Implementation  Nutrition education for nutrition relationship to health/disease: See nutrition education note.   Collaboration with other providers: Discussed calcium/vitamin D  supplementation with teams. See future/additional rec #3 above.     Goals  Patient to consume % of nutritionally adequate meal trays TID, or the equivalent with supplements/snacks.    Monitoring/Evaluation  Progress toward goals will be monitored and evaluated per protocol.      Nutrition will continue to follow.    Mary Silva MS, RD, LD, [facility]   6C Pgr: [phone]

## 2018-06-22 NOTE — PROGRESS NOTES
"Nephrology Progress Note  06/22/2018         Murray Nicholson is a 63 year old year old male with PMH of HTN, DMII, functionally bicuspid aortic valve, CHF/CM (EF 10-15%), ESRD, admitted with worsening dyspnea/SOB, now S/P heart tx 6/14 and started on CRRT, Nephrology managing HD/CRRT.      Interval History  Mr Nicholosn is recovering well from heart tx last week, had HD yesterday with 3L of UF without issue.  No wt today but already below outpt EDW, continuing to challenge.  Will plan to run tomorrow am then D/C with plan for outpt HD on tues 6/26.  Will send DC summary to outpt unit, can arrange to be seen in nephrology transplant clinic at some point.        Assessment & Recommendations:   ESRD: due to DM, on PD since Aug 2017, changed to HD 1/18, now TTS, at [facility] under care Dr Mcpherson, [facility] tunnel, EDW 75.5-76 kg, 3.5 hrs.                          -HD today, challenging EDW and now below his outpt EDW, increased UF to 3L today without issue.        Volume-EDW 76kg, down to 73.6 today with reasonable BP's and no edema on exam, increased UF goal from 2 to 3L without issue, will continue to challenge while he is inpt.        Electrolytes/pH-K 4.4, bicarb 22, no issues.        BMD- Ca 9.2, alb 3.3 last check, phos 5.2 last check, no acute issues.      Heart Tx-On mycophenolate and methylpred.      Anemia-Cont venofer 100 mg Qtues, cont Epo 4000 u with dialysis now that he is on iHD.        Nutrition-Taking PO.      Discussed with Dr Tena      Recommendations were communicated to team via verbal communication.         Moris Sevilla  Clinical Nurse Specialist  [phone]    Review of Systems:   I reviewed the following systems:  Gen: No fevers or chills  CV: No CP  Resp: No SOB  GI: No N/V    Physical Exam:   I/O last 3 completed shifts:  In: 257.93 [P.O.:240; I.V.:17.93]  Out: 3000 [Other:3000]   /83 (BP Location: Right arm)  Pulse 101  Temp 97.8  F (36.6  C) (Oral)  Resp 18  Ht 1.727 m (5' 8\")  Wt " 74.7 kg (164 lb 11.2 oz)  SpO2 99%  BMI 25.04 kg/m2     GENERAL APPEARANCE: Lying in bed, in no distress  EYES:  No scleral icterus, pupils equal  HENT: mouth without ulcers or lesions  PULM: lungs clear to auscultation, equal air movement, no cyanosis or clubbing  CV: regular rhythm, normal rate, no rub     -JVP not elevated     -edema trace LE.  GI: soft, non-tender, not distended, bowel sounds are +  MS: no evidence of inflammation in joints, no muscle tenderness  NEURO: Alert and oriented  Lines-tunneled HD line    Labs:   All labs reviewed by me  Electrolytes/Renal -   Recent Labs   Lab Test  06/22/18   0608  06/21/18   0605  06/20/18   0433  06/19/18   0657  06/18/18   0827  06/17/18   1630   NA  132*  131*  132*  128*  130*  130*   POTASSIUM  4.4  5.2  4.8  5.1  5.0  4.6   CHLORIDE  94  96  97  94  94  94   CO2  22  19*  20  18*  22  23   BUN  50*  71*  40*  68*  52*  34*   CR  4.71*  6.65*  4.77*  6.50*  5.01*  3.48*   GLC  440*  229*  263*  170*  113*  168*   TRINI  9.2  9.2  9.1  9.0  9.0  9.6   MAG   --    --    --   2.2  2.2  2.2   PHOS   --    --    --   5.2*  4.0  4.3       CBC -   Recent Labs   Lab Test  06/22/18   0608  06/21/18   0605  06/20/18   0433   WBC  14.3*  17.6*  10.5   HGB  10.6*  10.3*  9.8*   PLT  262  239  193       LFTs -   Recent Labs   Lab Test  06/18/18   0827  06/17/18   0433  06/16/18   0342   ALKPHOS  114  105  85   BILITOTAL  0.8  1.1  2.0*   ALT  34  33  30   AST  56*  92*  111*   PROTTOTAL  6.9  7.2  6.2*   ALBUMIN  3.3*  3.5  3.0*       Iron Panel -   Recent Labs   Lab Test  05/08/18   0954  07/19/17   1306  07/05/17   1204   IRON  58  46  26*   IRONSAT  27  18  12*   ALBERTINA  621*  369  542*           Current Medications:    aspirin  81 mg Oral Daily     glipiZIDE  5 mg Oral Daily with breakfast     heparin  5,000 Units Subcutaneous Q8H     hydrALAZINE  50 mg Oral 4x Daily     insulin aspart  1-7 Units Subcutaneous TID AC     insulin aspart  1-5 Units Subcutaneous At Bedtime      mycophenolate  1,500 mg Oral BID IS     NEPHROCAPS  1 capsule Oral Daily     nystatin  1,000,000 Units Swish & Swallow 4x Daily     pantoprazole  40 mg Oral QAM     pravastatin  40 mg Oral Daily     predniSONE  25 mg Oral BID    Followed by     [START ON 6/24/2018] predniSONE  20 mg Oral BID     senna-docusate  1 tablet Oral BID     sodium chloride (PF)  3 mL Intracatheter Q8H     sulfamethoxazole-trimethoprim  1 half-tab Oral QPM     terbutaline  5 mg Oral or Feeding Tube Q8H     valGANciclovir  450 mg Oral or Feeding Tube Once per day on Mon Thu       IV fluid REPLACEMENT ONLY       IV fluid REPLACEMENT ONLY       dextrose 5% and 0.45% NaCl 10 mL/hr at 06/15/18 0900     Reason beta blocker order not selected       tacrolimus (Prograf) infusion ADULT 90 mcg/hr (06/22/18 0938)

## 2018-06-22 NOTE — PROGRESS NOTES
Cardiology Heart Failure Progress Note    Assessment & Plan   63 year old male with a PMH of CAD, ICM (EF of 15-20%), ESRD, bicuspid aortic valve with AI, severe MR, and proximal AAA who was admitted for decompensated heart failure. He underwent OHT on 7/15/2018.       Today's change  - start PO tacro from IV tacro    - Hemodynamic support                        - isoproterenol off -> transitioned to terbutaline 5mg q8h VVI epicardial pacing (lower rate limit at 80)                        - Per Surgery team, to have SBP <130 due to visualized aortic aneurysm       - Immunosuppression                        - Mycophenolate 1500 mg bid (transition to PO 6/16), methylprednisolone -> prednisone 50 mg bid from 6/16- with continued taper now 30 mg bid                        - starting Tacro, now 1.5mg bid(6/16) ->1 mg bid(6/7): switch to iv tacro 6/20, daily tacro levels     - Infection prophylaxis-  CMV/ recepient - / donor +,   EBV + / pending    High risk Donor - will need to check infection HIV, hepatitis panel and so forth in 1-2 months                        - Nystatin daily                        - Trimethoprim-sulfamethoxazole renally dosed                - valganciclovir x2/week as HD adjusted dosage     - CAV prophylaxis                        - Hold aspirin/rosuvastatin until improvement in systemic condition     # right IV thromus    # Chronic Medical Issues:  - Seborrheic keratoses / Dermatofibromas / Multiple benign nevi / Nummular dermatitis: derm consulted, all lesions benign  - Hx of allergic rhinitis: cetrizine  - ESRD: Dialysis T, Th, Sa   - Ascending aortic aneurysm: 4.5 x 4.5 cm proximal ascending aortic aneurysm seen on MRI 2/14  - Multiple benign branch duct-IPMNs: no concerning features affecting transplant candidacy  - gout: allopurinol, prednisone 6/26 - 5/31 (tapered for acute flare)    Discussed with Dr. Kulwant Coffey   Cardiology fellow    Interval History     No acute events  HR  "in the 90's with terbutaline  Feeling better.  HD went well    Physical Exam   Temp: 97.9  F (36.6  C) Temp src: Oral BP: (!) 169/96 Pulse: 104 Heart Rate: 104 Resp: 16 SpO2: 99 % O2 Device: None (Room air)    Vitals:    06/21/18 0600 06/21/18 1257 06/22/18 0500   Weight: 76.3 kg (168 lb 4.8 oz) 73.6 kg (162 lb 4.1 oz) 74.7 kg (164 lb 11.2 oz)     Vital Signs with Ranges  Temp:  [97.6  F (36.4  C)-98.2  F (36.8  C)] 97.9  F (36.6  C)  Pulse:  [101-106] 104  Heart Rate:  [101-107] 104  Resp:  [15-18] 16  BP: (121-169)/() 169/96  SpO2:  [98 %-100 %] 99 %  I/O last 3 completed shifts:  In: 757.93 [P.O.:740; I.V.:17.93]  Out: -     Heart Rate: 104, Blood pressure (!) 169/96, pulse 104, temperature 97.9  F (36.6  C), temperature source Oral, resp. rate 16, height 1.727 m (5' 8\"), weight 74.7 kg (164 lb 11.2 oz), SpO2 99 %.  164 lbs 11.2 oz  GEN:  NAD, eating breakfast   CV: tachycardic, no murmur, no S3 or S4  LUNGS:  Lungs with decreased breath sounds  ABD:  Active bowel sounds, soft, non-tender/non-distended.  No rebound/guarding/rigidity.  EXT:  No edema or cyanosis.      Medications     IV fluid REPLACEMENT ONLY       IV fluid REPLACEMENT ONLY       dextrose 5% and 0.45% NaCl 10 mL/hr at 06/15/18 0900     Reason beta blocker order not selected       tacrolimus (Prograf) infusion ADULT 90 mcg/hr (06/22/18 0938)       apixaban ANTICOAGULANT  2.5 mg Oral BID     aspirin  81 mg Oral Daily     glipiZIDE  5 mg Oral Daily with breakfast     hydrALAZINE  50 mg Oral 4x Daily     insulin aspart  1-7 Units Subcutaneous TID AC     insulin aspart  1-5 Units Subcutaneous At Bedtime     mycophenolate  1,500 mg Oral BID IS     NEPHROCAPS  1 capsule Oral Daily     nystatin  1,000,000 Units Swish & Swallow 4x Daily     pantoprazole  40 mg Oral QAM     pravastatin  40 mg Oral Daily     predniSONE  25 mg Oral BID    Followed by     [START ON 6/24/2018] predniSONE  20 mg Oral BID     senna-docusate  1 tablet Oral BID     sodium " chloride (PF)  3 mL Intracatheter Q8H     sulfamethoxazole-trimethoprim  1 half-tab Oral QPM     [START ON 6/23/2018] tacrolimus  3 mg Oral QAM     tacrolimus  3.5 mg Oral QPM     terbutaline  5 mg Oral or Feeding Tube Q8H     valGANciclovir  450 mg Oral or Feeding Tube Once per day on Mon Thu       Data     Recent Labs  Lab 06/22/18  0608 06/21/18  0605 06/20/18  0433  06/18/18  0827  06/17/18  0433   WBC 14.3* 17.6* 10.5  < > 14.7*  --  14.7*   HGB 10.6* 10.3* 9.8*  < > 9.5*  --  9.2*   MCV 90 88 91  < > 91  --  90    239 193  < > 143*  --  139*   INR 1.09 1.04 1.04  < > 1.02  --  1.03   * 131* 132*  < > 130*  < > 131*   POTASSIUM 4.4 5.2 4.8  < > 5.0  < > 4.5   CHLORIDE 94 96 97  < > 94  < > 94   CO2 22 19* 20  < > 22  < > 24   BUN 50* 71* 40*  < > 52*  < > 22   CR 4.71* 6.65* 4.77*  < > 5.01*  < > 2.42*   ANIONGAP 16* 16* 15*  < > 14  < > 12   TRINI 9.2 9.2 9.1  < > 9.0  < > 9.6   * 229* 263*  < > 113*  < > 172*   ALBUMIN  --   --   --   --  3.3*  --  3.5   PROTTOTAL  --   --   --   --  6.9  --  7.2   BILITOTAL  --   --   --   --  0.8  --  1.1   ALKPHOS  --   --   --   --  114  --  105   ALT  --   --   --   --  34  --  33   AST  --   --   --   --  56*  --  92*   < > = values in this interval not displayed.    Recent Results (from the past 24 hour(s))   US Upper Extremity Venous Duplex Bilat    Narrative    EXAMINATION: Ultrasound upper extremity venous duplex bilateral,  6/22/2018 4:41 PM     COMPARISON: None.    HISTORY: right IJ thrombus. We would like to check IJ access. Check  thrombus or not as much as you can. Bilateral IJ to central    TECHNIQUE:  Gray-scale evaluation with compression, spectral flow and  color Doppler assessment of the deep venous system of the bilateral  upper extremities.    FINDINGS:  Chronic, peripheral thrombus in the right IJ with partial compression.  The right innominate and subclavian veins are patent without evidence  of thrombus. The left internal jugular,  innominate, subclavian, and  axillary veins are patent without evidence of thrombus.      Impression    IMPRESSION: Partially occlusive chronic appearing thrombus in the  right IJ.    [Result: Chronic appearing right IJ nonocclusive thrombus.]    Finding was identified on 6/22/2018 4:25 PM.     Patient's nurse Jake on unit 6 C was contacted by Dr. Salmon at  6/22/2018 4:45 PM and verbalized understanding of the urgent finding.     I have personally reviewed the examination and initial interpretation  and I agree with the findings.    PATO SALMON MD              Discussed with John Cyr.    Tunde Coffey MD  Cardiology Fellow [phone]

## 2018-06-22 NOTE — DISCHARGE INSTRUCTIONS
Discharge RN: Please fax discharge orders, latest labs and nephrology discharge summary to [facility] (Fax: [phone])    Nutrition  Diet: Regular diet as tolerated, or as recommended by your team/doctors  *Post-Transplant Diet Guidelines - Follow recommendations in the transplant book (summary below).    -  Maintain a healthy weight.  -  Eat a heart-healthy diet (low sodium, low saturated and trans-fat) once your appetite improves to normal.  -  Control blood sugar.  -  Limit sodium (salt).  -  Take calcium and vitamin D supplement if your doctor or team orders.  -  Eat more protein for six to eight weeks after transplant.    -  Prevent food poisoning, store and prepare foods to the proper temperature, practice good handwashing, heat all deli meat (to 165 degrees Fahrenheit), avoid raw fish and meats (including uncooked eggs, non-pasteurized or raw milk, and non-pasteurized or raw cheeses including brie, camembert, blue-veined cheese, and Mexican queso fresco), and throw out leftovers older than two days.   -  In some cases (but not in all cases), adjust potassium intake.        [facility]      AFTER YOU GO HOME FROM YOUR HEART SURGERY    You had a full sternotomy, so avoid lifting anything greater than ten pounds for 6 weeks after surgery and then less than 20 pounds for an additional 6 weeks.    No driving for 4 weeks after surgery or while on pain medication.     Avoid strenuous activities such as bowling, vacuuming, raking, shoveling, golf or tennis for 12 weeks after your surgery. It is okay to resume sex if you feel comfortable in doing so. You may have to try different positions with your partner.     Splint your chest incision by hugging a pillow or bringing your arms across your chest when coughing or sneezing. Avoid pushing off with your arms when getting up for the first month if you have had your sternum opened.    Shower or wash your incisions daily  with soap and water (or as instructed), pat dry. Keep wound clean and dry, showers are okay after discharge, but don't let spray hit directly on incision. No baths or swimming for 1 month.  Clean wounds twice a day for 2 weeks with microklenz spray if available or just soap and water. Cover chest tube sites with gauze until they stop draining, then leave open. It is not abnormal for chest tube sites to drain yellowish/clear fluid for up to 2-3 weeks after surgery.   Watch for signs of infection: increased redness, tenderness, warmth or any drainage that appears infected (pus like) or is persistent.  Also a temperature > 100.5 F or chills. Call your surgeon or primary care provider's office immediately. Remove any skin glue left on incisions after 10-14 days. This will not affect your incision and can speed up healing.    Exercise is very important in your recovery. Please follow the guidelines set up for you in your cardiac rehab classes at the hospital. If outpatient cardiac rehab was ordered for you, we highly recommend you participate. If you have problems arranging your cardiac rehab, please call [phone].     Avoid sitting for prolonged periods of time, try to walk every hour during the day. If you have a leg incision, elevate your leg often when you are not walking.    Check your weight when you get home from the hospital and continue to check it daily through your recovery for at least a month. If you notice a weight gain of 2-3 pounds in a week, notify your primary care physician, cardiologist or surgeon.    Bowel activity may be slow after surgery. If necessary, you may take an over the counter laxative such as Milk of Magnesia or Miralax. You may have stool softeners prescribed (docusate sodium, Senokot). We recommend using stool softeners while using narcotics for pain (oxycodone/percocet, hydrocodone/vicodin).      Wean OFF of narcotics (oxycodone, dilaudid, hydrocodone) as soon as possible. You  should continue taking acetaminophen as long as you have any surgical pain as the first choice for pain control.  Add narcotics as necessary for pain to be tolerable     DENTAL VISITS AFTER SURGERY  Please notify your dentist before any procedure for the proper treatment needed. The antibiotic is taken by mouth one hour prior to visit. This includes routine cleanings.    DO NOT SMOKE.  IF YOU NEED HELP QUITTING, PLEASE TALK WITH YOUR CARDIOLOGIST OR PRIMARY DOCTOR.      You had a heart transplant call your Transplant Coordinator for follow-up care and management.     REGARDING PRESCRIPTION REFILLS.  If you need a refill on your pain medication contact us to discuss your pain and a possible one time refill.   All other medications will be adjusted, discontinued and re-filled by your primary care physician and/or your cardiologist as they were prior to your surgery. We have given you enough for one to three month with possibly one refill.    POST-OPERATIVE CLINIC VISITS  You have a follow-up visit with your cardiology team for your first post-op heart transplant visit on 7/10/2018 at 1:45PM at the [facility] [place].   If there is a need to return to see CT Surgery please call our  at [phone].    SURGICAL QUESTIONS  Please call Amrita Tobin with any surgical recovery and medication questions, her phone number is listed below.  She can assist you with your needs and contact other surgery care team members as indicated.    On weekends or after hours, please call [phone] and ask the  to   page the Cardiothoracic Surgery fellow on call.      Thank you,    Your Cardiothoracic Surgery Team  Amrita Tobin RN Care Coordinator-  [phone]   Lauren LOVEC

## 2018-06-22 NOTE — PROGRESS NOTES
"  Care Coordinator - Discharge Planning    Admission Date/Time:  4/16/2018  Attending MD:  Rony Caputo,     Data  Date of initial CC assessment: 4/18/2018  Chart reviewed, discussed with interdisciplinary team.   Patient was admitted for:    Chronic systolic heart failure (H)  End stage renal disease (H)  Pt is now s/p Heart transplant on 6/14/18.      Concerns with insurance coverage for discharge needs: TBD  Current Living Situation: Patient said that his two adult sons will be staying with him.   Support System: Supportive and Involved sons: Clara, also friend Naresh Peck.   Services Involved: [facility]  Transportation at Discharge: Family or friend will provide  Transportation to Medical Appointments: family to provide.   Barriers to Discharge: post-op recovery.      Coordination of Care and Referrals: Provided patient/family with options for resumption of outpt dialysis.    Assessment  OT is recommending OPCR; pt is in agreement with this plan.   Per MD, pt may be able to discharge tomorrow after dialysis, pending tacro level. This writer updated Jacquelin at [facility] that patient will start with them on Tuesday 6/26/2018. Jacquelin stated that they still need orders, this writer paged Moris Sevilla, nephrology NP to update.   Intervention:   Arrangements made with [facility] (Ph: [phone] Fax: [phone]) for restarting of outpt dialysis on T-Th-Sat at 11 AM, starting 6/26/2018.  Plan  Anticipated Discharge Date:  TBD   Anticipated Discharge Plan:  Discharge to home with OP CR  Kavitha Bates RN BSN  6C Unit Care Coordinator  Phone number: [phone]  Pager: [phone]    Addendum 6/22/2018 at 5058:  This writer stopped by to update pt that if able to dc over weekend he can resume outpt dialysis on Tues, 6/26. Pt expressed concern that he is now scheduled for his first outpt heart biopsy on 6/26 with labs prior, pt stated, \"I " "won't be at dialysis.\" This writer updated CVTS team. Per discussion with Eliseo Lange they can accommodate a run on Monday, 6/25 at 12:30pm if needed.   "

## 2018-06-22 NOTE — PROGRESS NOTES
CVTS Daily Note  6/21/2018  Attending: Rony Caputo,*    S:   No overnight events.  Biopsy result 1R, will take TPW out today  Pain is being controlled.  Breathing is good.  Appetite is good.  Denies any popping/clicking in his breast bone.  Ambulating.    Pt seen at bedside resting comfortably.    No acute complaints.      Denies F/C/Sweats.  No CP, SOB, or calf pain.    Tolerating diet.  + BM.  + Flatus.    Ambulated well without assistance.    Pain level tolerable. Plan as per Neuro section below.     O:   Vitals:    06/22/18 0423 06/22/18 0500 06/22/18 0910 06/22/18 1145   BP: 152/88  (!) 156/93 151/83   BP Location:   Right arm Right arm   Cuff Size:       Pulse:   106 101   Resp:   18 18   Temp:   97.7  F (36.5  C) 97.8  F (36.6  C)   TempSrc:   Oral Oral   SpO2:   100% 99%   Weight:  74.7 kg (164 lb 11.2 oz)     Height:         Vitals:    06/21/18 0600 06/21/18 1257 06/22/18 0500   Weight: 76.3 kg (168 lb 4.8 oz) 73.6 kg (162 lb 4.1 oz) 74.7 kg (164 lb 11.2 oz)         Intake/Output Summary (Last 24 hours) at 06/21/18 1422  Last data filed at 06/21/18 1257   Gross per 24 hour   Intake              240 ml   Output             3000 ml   Net            -2760 ml       MAPs:   CT removed  Gen: up in room, comfortable, -O2  CV: RRR, telemetry ST 100s, -edema LE  Pulm: CTA  Abd: BS+, soft  Incision: sternal incision D/I    Labs:  BMP    Recent Labs  Lab 06/22/18  0608 06/21/18  0605 06/20/18  0433 06/19/18  0657   * 131* 132* 128*   POTASSIUM 4.4 5.2 4.8 5.1   CHLORIDE 94 96 97 94   TRINI 9.2 9.2 9.1 9.0   CO2 22 19* 20 18*   BUN 50* 71* 40* 68*   CR 4.71* 6.65* 4.77* 6.50*   * 229* 263* 170*     CBC    Recent Labs  Lab 06/22/18  0608 06/21/18  0605 06/20/18  0433 06/19/18  0657   WBC 14.3* 17.6* 10.5 13.3*   RBC 3.70* 3.55* 3.39* 3.22*   HGB 10.6* 10.3* 9.8* 9.5*   HCT 33.4* 31.3* 30.7* 28.6*   MCV 90 88 91 89   MCH 28.6 29.0 28.9 29.5   MCHC 31.7 32.9 31.9 33.2   RDW 17.2* 16.9* 16.8*  16.9*    239 193 159     INR    Recent Labs  Lab 06/22/18  0608 06/21/18  0605 06/20/18  0433 06/19/18  0657   INR 1.09 1.04 1.04 1.01      Hepatic Panel   Lab Results   Component Value Date    AST 56 06/18/2018     Lab Results   Component Value Date    ALT 34 06/18/2018     Lab Results   Component Value Date    ALBUMIN 3.3 06/18/2018     GLUCOSE:     Recent Labs  Lab 06/22/18  1148 06/22/18  0909 06/22/18  0759 06/22/18  0608 06/22/18  0421 06/21/18  2205 06/21/18  1711  06/21/18  0605  06/20/18  0433  06/19/18  0657  06/18/18  0827  06/17/18  1630   GLC  --   --   --  440*  --   --   --   --  229*  --  263*  --  170*  --  113*  --  168*   * 377* 308*  --  448* 290* 292*  < >  --   < >  --   < >  --   < >  --   < >  --    < > = values in this interval not displayed.      Imaging:  reviewed recent imaging      A/P:    Murray Nicholson is a 63 year old male CAD, ICM (EF of 15-20%), ESRD on HD, bicuspid aortic valve with AI, severe MR, and proximal AAA who was admitted for decompensated heart failure 4/16/18 now s/p heart transplant on 6/14/18. Extubated uneventfully, weaned off of the nitric oxide easily.  6/20: RHC and BX, 1R cellular rejection, no antibody rejection.       Neuro:   - Neuro intact  - Scheduled Tylenol and PRN oxycodone   - Leg restlessness complaints, but slowly improving.       CV:   - No arrhythmia, HD stable.  - ASA 81 mg  - HTN; Hydralazine 25 mg q 6 hrs (patient concerned this is causing leg restlessness)   - Keep TPW capped, remove after results of 1st biopsy    - First RHC and Biopsy scheduled for 6/21   - Tacro 5.0 on IV tacro gtt.       Pulm:   - Pulm toilet, IS, activity and deep breathing       ID:   - WBC 14.3, afebrile, no signs or symptoms of infection, on steroids  - Immunosuppression per Cardiology       GI / FEN:  - 2 gr Na Reg diet, bowel regimen  - Large pneumoperitoneum on Abd xray, continue to monitor. Asymptomatic.       Renal / :   - Creatinine 4.71, adequate UOP.    - Nephrology following, Dialysis TTS      Heme:   - Hgb 10.6, Plts WNL      Endo:   - Glipizide 2.5 mg q AM (no renal dosing needed)   - Med sliding scale insulin       PPX:   - Protonix      Anticoagulation:   - Hep 5000 U q 8 hrs for DVT prophylaxis       Dispo:   - 6C since 6/17  - TPW wire removed and Tacro levels stable       Discussed with CVTS Fellow   Staff surgeons have been informed of changes through both  verbal and written communication.      Silviano Baker PA-C  [phone]

## 2018-06-22 NOTE — PLAN OF CARE
Problem: Patient Care Overview  Goal: Plan of Care/Patient Progress Review  RN:  1.Pt will remain hemodynamically stable.  2.BG WNL.   Outcome: Improving    D: admitted 4/16 s/p heart transplant on 6/14, hx HTN, DM2, bicuspid AV, ESRD on HD, HF w/ EF 10-15%, severe MR    I/A:   Neuro- A&Ox4, independent, slept well between cares  CV- ST 90-110s, -150/60-90s  Pulm-sating well on RA  GI/-Oliguric on HD. 2 g Na, renal diet. BM 6/20. BG overnight 451 and 448, see provider notification.  LDA-R PICC w/ tacrolimus @ 90 mcg/hr (4.5 mL/hr), capped pacer wires.  Skin- sternal incision OBED, WDL  Pain- denies    P:  Plan to remove pacer wires today. Goal to stabilize tacro levels. Continue to monitor and notify CVTS with any changes or concerns.

## 2018-06-22 NOTE — PROGRESS NOTES
Transplant Social Work Service Discharge Note      Patient Name:  Murray Nicholson     Anticipated Discharge Date:  Over the weekend (6/23-6/25)    Discharge Disposition:   Other:  Home    Plan for 24 hour care for immediate post transplant period: Family and friends have a plan in place    If not local, plans for short term stay:  N/a lives local    Additional Services/Equipment Arranged:  None by [facility]     Persons notified of above discharge plan:  Murray informed [facility] that he'll go home once his Taco level is stable.     Patient / Family response to discharge plan:  Murray is surprised at how fast he's d/cing from the hospital. He is glad that transplant went well and he received a heart more quickly than he had thought. He has accepted that he continues to wait for his kidney transplant.     Education and resources provided by [facility] at discharge: role of transplant  in out patient setting and provided contact info for , availability of support groups, and financial assistance with his first COBRA payment    Discussed anticipated pharmacy out of pocket costs: YES    Provided [employer] Donor Letter Writing packet : YES    Plan: [facility] informed that Murray's insurance through his employer will continue until the end of June. He will need to pay his first COBRA payment for July; while choosing a Medicare supplement policy. He requests assistance with this cost. [facility] informed him to please get the bill to [facility] as soon as he gets it so porter paperwork can be filed.     [facility] will continue to be available as needed.    Pager [phone]

## 2018-06-23 LAB
ALBUMIN SERPL-MCNC: 3.1 G/DL (ref 3.4–5)
ALP SERPL-CCNC: 136 U/L (ref 40–150)
ALT SERPL W P-5'-P-CCNC: 26 U/L (ref 0–70)
ANION GAP SERPL CALCULATED.3IONS-SCNC: 14 MMOL/L (ref 3–14)
ANION GAP SERPL CALCULATED.3IONS-SCNC: 16 MMOL/L (ref 3–14)
APTT PPP: 28 SEC (ref 22–37)
AST SERPL W P-5'-P-CCNC: 13 U/L (ref 0–45)
BASOPHILS # BLD AUTO: 0 10E9/L (ref 0–0.2)
BASOPHILS NFR BLD AUTO: 0.1 %
BILIRUB SERPL-MCNC: 0.6 MG/DL (ref 0.2–1.3)
BUN SERPL-MCNC: 42 MG/DL (ref 7–30)
BUN SERPL-MCNC: 78 MG/DL (ref 7–30)
CA-I BLD-MCNC: 4.5 MG/DL (ref 4.4–5.2)
CALCIUM SERPL-MCNC: 9 MG/DL (ref 8.5–10.1)
CALCIUM SERPL-MCNC: 9.3 MG/DL (ref 8.5–10.1)
CHLORIDE BLD-SCNC: 93 MMOL/L (ref 94–109)
CHLORIDE SERPL-SCNC: 94 MMOL/L (ref 94–109)
CHLORIDE SERPL-SCNC: 94 MMOL/L (ref 94–109)
CO2 SERPL-SCNC: 20 MMOL/L (ref 20–32)
CO2 SERPL-SCNC: 22 MMOL/L (ref 20–32)
CREAT SERPL-MCNC: 4.01 MG/DL (ref 0.66–1.25)
CREAT SERPL-MCNC: 6.45 MG/DL (ref 0.66–1.25)
DIFFERENTIAL METHOD BLD: ABNORMAL
EOSINOPHIL # BLD AUTO: 0 10E9/L (ref 0–0.7)
EOSINOPHIL NFR BLD AUTO: 0.2 %
ERYTHROCYTE [DISTWIDTH] IN BLOOD BY AUTOMATED COUNT: 17.1 % (ref 10–15)
ERYTHROCYTE [DISTWIDTH] IN BLOOD BY AUTOMATED COUNT: 17.1 % (ref 10–15)
GFR SERPL CREATININE-BSD FRML MDRD: 15 ML/MIN/1.7M2
GFR SERPL CREATININE-BSD FRML MDRD: 9 ML/MIN/1.7M2
GLUCOSE BLD-MCNC: 352 MG/DL (ref 70–99)
GLUCOSE BLDC GLUCOMTR-MCNC: 271 MG/DL (ref 70–99)
GLUCOSE BLDC GLUCOMTR-MCNC: 291 MG/DL (ref 70–99)
GLUCOSE BLDC GLUCOMTR-MCNC: 292 MG/DL (ref 70–99)
GLUCOSE BLDC GLUCOMTR-MCNC: 315 MG/DL (ref 70–99)
GLUCOSE BLDC GLUCOMTR-MCNC: 352 MG/DL (ref 70–99)
GLUCOSE SERPL-MCNC: 305 MG/DL (ref 70–99)
GLUCOSE SERPL-MCNC: 306 MG/DL (ref 70–99)
HCT VFR BLD AUTO: 31.8 % (ref 40–53)
HCT VFR BLD AUTO: 33.2 % (ref 40–53)
HGB BLD-MCNC: 10.7 G/DL (ref 13.3–17.7)
HGB BLD-MCNC: 10.8 G/DL (ref 13.3–17.7)
IMM GRANULOCYTES # BLD: 0.1 10E9/L (ref 0–0.4)
IMM GRANULOCYTES NFR BLD: 0.9 %
INR PPP: 1.07 (ref 0.86–1.14)
LACTATE BLD-SCNC: 2 MMOL/L (ref 0.4–1.9)
LACTATE BLD-SCNC: 2 MMOL/L (ref 0.7–2)
LACTATE BLD-SCNC: 2.2 MMOL/L (ref 0.7–2)
LYMPHOCYTES # BLD AUTO: 1 10E9/L (ref 0.8–5.3)
LYMPHOCYTES NFR BLD AUTO: 6.9 %
MCH RBC QN AUTO: 29 PG (ref 26.5–33)
MCH RBC QN AUTO: 29.2 PG (ref 26.5–33)
MCHC RBC AUTO-ENTMCNC: 32.5 G/DL (ref 31.5–36.5)
MCHC RBC AUTO-ENTMCNC: 33.6 G/DL (ref 31.5–36.5)
MCV RBC AUTO: 87 FL (ref 78–100)
MCV RBC AUTO: 89 FL (ref 78–100)
MONOCYTES # BLD AUTO: 0.5 10E9/L (ref 0–1.3)
MONOCYTES NFR BLD AUTO: 3.3 %
NEUTROPHILS # BLD AUTO: 12.5 10E9/L (ref 1.6–8.3)
NEUTROPHILS NFR BLD AUTO: 88.6 %
NRBC # BLD AUTO: 0 10*3/UL
NRBC BLD AUTO-RTO: 0 /100
PLATELET # BLD AUTO: 263 10E9/L (ref 150–450)
PLATELET # BLD AUTO: 273 10E9/L (ref 150–450)
POTASSIUM BLD-SCNC: 4 MMOL/L (ref 3.4–5.3)
POTASSIUM SERPL-SCNC: 3.7 MMOL/L (ref 3.4–5.3)
POTASSIUM SERPL-SCNC: 4.8 MMOL/L (ref 3.4–5.3)
PROT SERPL-MCNC: 7 G/DL (ref 6.8–8.8)
RBC # BLD AUTO: 3.66 10E12/L (ref 4.4–5.9)
RBC # BLD AUTO: 3.72 10E12/L (ref 4.4–5.9)
SODIUM BLD-SCNC: 129 MMOL/L (ref 133–144)
SODIUM SERPL-SCNC: 130 MMOL/L (ref 133–144)
SODIUM SERPL-SCNC: 130 MMOL/L (ref 133–144)
TACROLIMUS BLD-MCNC: 10.4 UG/L (ref 5–15)
TME LAST DOSE: NORMAL H
WBC # BLD AUTO: 14.1 10E9/L (ref 4–11)
WBC # BLD AUTO: 14.3 10E9/L (ref 4–11)

## 2018-06-23 PROCEDURE — 84295 ASSAY OF SERUM SODIUM: CPT

## 2018-06-23 PROCEDURE — A9270 NON-COVERED ITEM OR SERVICE: HCPCS | Mod: GY | Performed by: STUDENT IN AN ORGANIZED HEALTH CARE EDUCATION/TRAINING PROGRAM

## 2018-06-23 PROCEDURE — 82330 ASSAY OF CALCIUM: CPT

## 2018-06-23 PROCEDURE — 99232 SBSQ HOSP IP/OBS MODERATE 35: CPT | Mod: GC | Performed by: INTERNAL MEDICINE

## 2018-06-23 PROCEDURE — 85610 PROTHROMBIN TIME: CPT | Performed by: THORACIC SURGERY (CARDIOTHORACIC VASCULAR SURGERY)

## 2018-06-23 PROCEDURE — 83605 ASSAY OF LACTIC ACID: CPT | Performed by: STUDENT IN AN ORGANIZED HEALTH CARE EDUCATION/TRAINING PROGRAM

## 2018-06-23 PROCEDURE — 00000146 ZZHCL STATISTIC GLUCOSE BY METER IP

## 2018-06-23 PROCEDURE — 25000132 ZZH RX MED GY IP 250 OP 250 PS 637: Performed by: PHYSICIAN ASSISTANT

## 2018-06-23 PROCEDURE — A9270 NON-COVERED ITEM OR SERVICE: HCPCS | Mod: GY | Performed by: PHYSICIAN ASSISTANT

## 2018-06-23 PROCEDURE — 25000128 H RX IP 250 OP 636: Performed by: CLINICAL NURSE SPECIALIST

## 2018-06-23 PROCEDURE — A9270 NON-COVERED ITEM OR SERVICE: HCPCS | Mod: GY | Performed by: INTERNAL MEDICINE

## 2018-06-23 PROCEDURE — 25000132 ZZH RX MED GY IP 250 OP 250 PS 637: Mod: GY | Performed by: PHYSICIAN ASSISTANT

## 2018-06-23 PROCEDURE — 85025 COMPLETE CBC W/AUTO DIFF WBC: CPT | Performed by: STUDENT IN AN ORGANIZED HEALTH CARE EDUCATION/TRAINING PROGRAM

## 2018-06-23 PROCEDURE — 80048 BASIC METABOLIC PNL TOTAL CA: CPT | Performed by: THORACIC SURGERY (CARDIOTHORACIC VASCULAR SURGERY)

## 2018-06-23 PROCEDURE — 25000131 ZZH RX MED GY IP 250 OP 636 PS 637: Mod: GY | Performed by: INTERNAL MEDICINE

## 2018-06-23 PROCEDURE — 82435 ASSAY OF BLOOD CHLORIDE: CPT

## 2018-06-23 PROCEDURE — 80053 COMPREHEN METABOLIC PANEL: CPT | Performed by: STUDENT IN AN ORGANIZED HEALTH CARE EDUCATION/TRAINING PROGRAM

## 2018-06-23 PROCEDURE — 25000132 ZZH RX MED GY IP 250 OP 250 PS 637: Performed by: STUDENT IN AN ORGANIZED HEALTH CARE EDUCATION/TRAINING PROGRAM

## 2018-06-23 PROCEDURE — 25000128 H RX IP 250 OP 636: Performed by: STUDENT IN AN ORGANIZED HEALTH CARE EDUCATION/TRAINING PROGRAM

## 2018-06-23 PROCEDURE — 40000802 ZZH SITE CHECK

## 2018-06-23 PROCEDURE — 25000125 ZZHC RX 250: Performed by: INTERNAL MEDICINE

## 2018-06-23 PROCEDURE — 25000132 ZZH RX MED GY IP 250 OP 250 PS 637: Mod: GY | Performed by: STUDENT IN AN ORGANIZED HEALTH CARE EDUCATION/TRAINING PROGRAM

## 2018-06-23 PROCEDURE — 83605 ASSAY OF LACTIC ACID: CPT | Performed by: THORACIC SURGERY (CARDIOTHORACIC VASCULAR SURGERY)

## 2018-06-23 PROCEDURE — A9270 NON-COVERED ITEM OR SERVICE: HCPCS | Mod: GY | Performed by: THORACIC SURGERY (CARDIOTHORACIC VASCULAR SURGERY)

## 2018-06-23 PROCEDURE — 25000132 ZZH RX MED GY IP 250 OP 250 PS 637: Mod: GY | Performed by: INTERNAL MEDICINE

## 2018-06-23 PROCEDURE — 83605 ASSAY OF LACTIC ACID: CPT

## 2018-06-23 PROCEDURE — 90937 HEMODIALYSIS REPEATED EVAL: CPT

## 2018-06-23 PROCEDURE — 21400006 ZZH R&B CCU INTERMEDIATE UMMC

## 2018-06-23 PROCEDURE — 36592 COLLECT BLOOD FROM PICC: CPT | Performed by: STUDENT IN AN ORGANIZED HEALTH CARE EDUCATION/TRAINING PROGRAM

## 2018-06-23 PROCEDURE — 36592 COLLECT BLOOD FROM PICC: CPT | Performed by: THORACIC SURGERY (CARDIOTHORACIC VASCULAR SURGERY)

## 2018-06-23 PROCEDURE — 25000132 ZZH RX MED GY IP 250 OP 250 PS 637: Mod: GY | Performed by: THORACIC SURGERY (CARDIOTHORACIC VASCULAR SURGERY)

## 2018-06-23 PROCEDURE — 85027 COMPLETE CBC AUTOMATED: CPT | Performed by: THORACIC SURGERY (CARDIOTHORACIC VASCULAR SURGERY)

## 2018-06-23 PROCEDURE — 84132 ASSAY OF SERUM POTASSIUM: CPT

## 2018-06-23 PROCEDURE — 82947 ASSAY GLUCOSE BLOOD QUANT: CPT

## 2018-06-23 PROCEDURE — 85730 THROMBOPLASTIN TIME PARTIAL: CPT | Performed by: THORACIC SURGERY (CARDIOTHORACIC VASCULAR SURGERY)

## 2018-06-23 PROCEDURE — 80197 ASSAY OF TACROLIMUS: CPT | Performed by: INTERNAL MEDICINE

## 2018-06-23 PROCEDURE — 36415 COLL VENOUS BLD VENIPUNCTURE: CPT | Performed by: STUDENT IN AN ORGANIZED HEALTH CARE EDUCATION/TRAINING PROGRAM

## 2018-06-23 PROCEDURE — 25000131 ZZH RX MED GY IP 250 OP 636 PS 637: Mod: GY | Performed by: STUDENT IN AN ORGANIZED HEALTH CARE EDUCATION/TRAINING PROGRAM

## 2018-06-23 PROCEDURE — 25000132 ZZH RX MED GY IP 250 OP 250 PS 637: Performed by: THORACIC SURGERY (CARDIOTHORACIC VASCULAR SURGERY)

## 2018-06-23 RX ORDER — HYDRALAZINE HYDROCHLORIDE 20 MG/ML
10-20 INJECTION INTRAMUSCULAR; INTRAVENOUS EVERY 4 HOURS PRN
Status: DISCONTINUED | OUTPATIENT
Start: 2018-06-23 | End: 2018-06-26

## 2018-06-23 RX ORDER — ISOSORBIDE DINITRATE 10 MG/1
10 TABLET ORAL EVERY 8 HOURS
Status: DISCONTINUED | OUTPATIENT
Start: 2018-06-23 | End: 2018-06-26

## 2018-06-23 RX ADMIN — TRAMADOL HYDROCHLORIDE 50 MG: 50 TABLET, COATED ORAL at 23:29

## 2018-06-23 RX ADMIN — TERBUTALINE SULFATE 5 MG: 5 TABLET ORAL at 20:44

## 2018-06-23 RX ADMIN — SODIUM CHLORIDE 250 ML: 9 INJECTION, SOLUTION INTRAVENOUS at 09:00

## 2018-06-23 RX ADMIN — NYSTATIN 1000000 UNITS: 100000 SUSPENSION ORAL at 20:42

## 2018-06-23 RX ADMIN — ACETAMINOPHEN 650 MG: 325 TABLET, FILM COATED ORAL at 13:50

## 2018-06-23 RX ADMIN — HYDRALAZINE HYDROCHLORIDE 20 MG: 20 INJECTION INTRAMUSCULAR; INTRAVENOUS at 07:21

## 2018-06-23 RX ADMIN — SENNOSIDES AND DOCUSATE SODIUM 1 TABLET: 8.6; 5 TABLET ORAL at 20:44

## 2018-06-23 RX ADMIN — ACETAMINOPHEN 650 MG: 325 TABLET, FILM COATED ORAL at 06:57

## 2018-06-23 RX ADMIN — TERBUTALINE SULFATE 5 MG: 5 TABLET ORAL at 05:10

## 2018-06-23 RX ADMIN — TACROLIMUS 3 MG: 1 CAPSULE ORAL at 08:06

## 2018-06-23 RX ADMIN — PREDNISONE 25 MG: 5 TABLET ORAL at 08:07

## 2018-06-23 RX ADMIN — APIXABAN 2.5 MG: 2.5 TABLET, FILM COATED ORAL at 08:07

## 2018-06-23 RX ADMIN — SENNOSIDES AND DOCUSATE SODIUM 1 TABLET: 8.6; 5 TABLET ORAL at 08:08

## 2018-06-23 RX ADMIN — NYSTATIN 1000000 UNITS: 100000 SUSPENSION ORAL at 13:36

## 2018-06-23 RX ADMIN — TACROLIMUS 3.5 MG: 1 CAPSULE ORAL at 18:21

## 2018-06-23 RX ADMIN — TERBUTALINE SULFATE 5 MG: 5 TABLET ORAL at 13:36

## 2018-06-23 RX ADMIN — BENZOCAINE AND MENTHOL 1 LOZENGE: 15; 3.6 LOZENGE ORAL at 07:21

## 2018-06-23 RX ADMIN — PANTOPRAZOLE SODIUM 40 MG: 40 TABLET, DELAYED RELEASE ORAL at 08:07

## 2018-06-23 RX ADMIN — ASPIRIN 81 MG: 81 TABLET, COATED ORAL at 08:07

## 2018-06-23 RX ADMIN — ACETAMINOPHEN 650 MG: 325 TABLET, FILM COATED ORAL at 18:30

## 2018-06-23 RX ADMIN — HYDRALAZINE HYDROCHLORIDE 75 MG: 25 TABLET ORAL at 20:43

## 2018-06-23 RX ADMIN — APIXABAN 2.5 MG: 2.5 TABLET, FILM COATED ORAL at 20:42

## 2018-06-23 RX ADMIN — SODIUM CHLORIDE 300 ML: 9 INJECTION, SOLUTION INTRAVENOUS at 09:00

## 2018-06-23 RX ADMIN — Medication 1 HALF-TAB: at 20:57

## 2018-06-23 RX ADMIN — NYSTATIN 1000000 UNITS: 100000 SUSPENSION ORAL at 18:21

## 2018-06-23 RX ADMIN — Medication 1 CAPSULE: at 08:08

## 2018-06-23 RX ADMIN — GLIPIZIDE 5 MG: 5 TABLET, FILM COATED, EXTENDED RELEASE ORAL at 08:08

## 2018-06-23 RX ADMIN — PREDNISONE 25 MG: 5 TABLET ORAL at 20:45

## 2018-06-23 RX ADMIN — ISOSORBIDE DINITRATE 10 MG: 10 TABLET ORAL at 22:56

## 2018-06-23 RX ADMIN — BENZOCAINE AND MENTHOL 1 LOZENGE: 15; 3.6 LOZENGE ORAL at 23:24

## 2018-06-23 RX ADMIN — MYCOPHENOLATE MOFETIL 1500 MG: 250 CAPSULE ORAL at 18:21

## 2018-06-23 RX ADMIN — PRAVASTATIN SODIUM 40 MG: 40 TABLET ORAL at 20:46

## 2018-06-23 RX ADMIN — ISOSORBIDE DINITRATE 10 MG: 10 TABLET ORAL at 13:36

## 2018-06-23 RX ADMIN — MYCOPHENOLATE MOFETIL 1500 MG: 250 CAPSULE ORAL at 08:06

## 2018-06-23 RX ADMIN — Medication: at 10:20

## 2018-06-23 RX ADMIN — NYSTATIN 1000000 UNITS: 100000 SUSPENSION ORAL at 08:08

## 2018-06-23 RX ADMIN — HYDRALAZINE HYDROCHLORIDE 75 MG: 25 TABLET ORAL at 13:36

## 2018-06-23 RX ADMIN — HYDRALAZINE HYDROCHLORIDE 10 MG: 20 INJECTION INTRAMUSCULAR; INTRAVENOUS at 04:06

## 2018-06-23 ASSESSMENT — PAIN DESCRIPTION - DESCRIPTORS
DESCRIPTORS: ACHING

## 2018-06-23 NOTE — PLAN OF CARE
Problem: Patient Care Overview  Goal: Plan of Care/Patient Progress Review  RN:  1.Pt will remain hemodynamically stable.  2.BG WNL.   D: Pt with h/y of CAD,ICM, (EF 15-20%), severe MR, prox AAA,  ESRD on HD, DHF, DM 2,  OHT on 6/14/18 per MD note.     I: Monitored vitals and assessed pt status.  Changed: PO tacro started   PRN: Tylenol and Tramadol for incisional pain with effect.   A: A0x4. VSS, on RA,-104/SR 97. Afebrile.  Blood sugar 335 at bedtime 3 U of insulin given per scale.   Pt reported that he has sore throat, feels  enlarged right lymp node, pt wanted to see MD since his WBC elevated 14.3, MD cross cover notified, Cepacol lozenge prn given .   around 10 p.m, hydralazine prn given, SBP went down to 155, around 23 p.m  ,MD in the room and aware, MD will clarify with fellow if pt needs extra BP med, no order was received at this time,pt asymptomatic.   around 4 a.m.  hydralazine prn given, recheck 170/96,MD notified, recheck around 6:30 and notify MD if  SBP > 150 per verbal MD order. /99, MD ( [phone] notified), Hydralazine prn given per MD order.    Temp:  [97.7  F (36.5  C)-98.6  F (37  C)] 98.6  F (37  C)  Pulse:  [101-108] 108  Heart Rate:  [101-108] 108  Resp:  [15-21] 21  BP: (121-170)/() 150/85  SpO2:  [98 %-100 %] 98 %      P: Continue to monitor Pt status and report changes to treatment team. HD today

## 2018-06-23 NOTE — PROGRESS NOTES
CLINICAL NUTRITION SERVICES - DISCHARGE NOTE    Patient s discharge needs assessed and discharge planning has been conducted with the multidisciplinary transplant care team including physicians, pharmacy, social work and transplant coordinator.    Follow up/Monitoring:  Once discharged, place additional outpatient nutrition consults via the transplant team if nutrition concerns arise.     Mary Silva, MS, RD, LD, [facility]   6C Pgr: [phone]

## 2018-06-23 NOTE — PROGRESS NOTES
[facility] Progress Note:    Was notified by RN that the patient was having sore throat when swallowing. Patient has had an elevated WBC count for the past two days. When I examined the patient he stated that he felt like he was getting warm, but his temperature was 97.8. He also complained that he felt like his throat was swollen and painful. No erythema or exudates were noted. There was a minor amount of swelling bilaterally of the neck. Patient did not exhibit any respiratory compromise or SOB. I ordered Cepacol lozenge PRN and will continue to monitor.    Vahid Clement MD  General Surgery PGY-1

## 2018-06-23 NOTE — PROGRESS NOTES
This patient was seen and examined while on dialysis. Laboratory results and nurses' notes were reviewed.  No changes to management of volume, anemia, BMD, acidosis, or electrolytes.  Diagnosis - ESRD 2/2 DM on TTS dialysis. Now s/p heart transplant recovering well.   --Optimal volume status with UF.   --will re-evaluate the need for HD on Monday, however likely will stay on his TTS schedule.     Bibi Tena MD  Nephrologist  [phone]

## 2018-06-23 NOTE — PROGRESS NOTES
CLINICAL NUTRITION SERVICES    Reason for Assessment:  Post-Tx nutrition education    Diet History:  Per pt, has not received formal nutrition education on post-Tx guidelines in the recent past.      Nutrition Diagnosis:  Food- and nutrition-related knowledge deficit r/t no recent post-Tx nutrition education AEB pt report of not receiving nutrition education on post-Tx diet guidelines in the recent past.      Interventions:  Nutrition Education  1. Provided verbal and written nutrition education on post-Tx nutrition guidelines. Nutrition education included need for increased protein and kcal needs post-op, food safety, and following restrictions needed for dialysis pending labs. Pt with no further questions at this time.  2. Handout given: Nutrition Care Manual handout on Organ Transplantation. Also, provided handouts Controlling Your Potassium Intake and Controlling Your Phosphorus Intake as per pt request.    Nutrition instructions for discharge were entered.      Goals:   Patient will verbalize at least three nutrition-related aspects of post-Tx diet guidelines.    Follow-up:    Patient to ask any further nutrition-related questions before discharge. In addition, pt may request outpatient RD appointment.    Mary Silva, MS, RD, LD, [facility]   6C Pgr:  [phone]

## 2018-06-23 NOTE — PLAN OF CARE
Problem: Patient Care Overview  Goal: Plan of Care/Patient Progress Review  RN:  1.Pt will remain hemodynamically stable.  2.BG WNL.   OT/CR: Cancel- Pt declining CR due to c/o dizziness with standing. BP 95/62 (73). RN aware.

## 2018-06-23 NOTE — PROGRESS NOTES
HEMODIALYSIS TREATMENT NOTE    Date: 6/23/2018  Time: 12:53 PM    Data:  Pre Wt: 76.5 kg (168 lb 10.4 oz)   Desired Wt: 73.5 kg   Post Wt: 73.8 kg (162 lb 11.2 oz)  Weight change: 2.7 kg  Ultrafiltration - Post Run Net Total Removed (mL): 2700 mL  Ultrafiltration - Post Run Net Total Gain (mL): 0 mL  Vascular Access Status: Yes, secured and visible  Dialyzer Rinse: Clear  Total Blood Volume Processed: 73  Total Dialysis (Treatment) Time: 3.5 hr      Lab:   No    Interventions:  Pt arrived for 3.5 hr HD, stood for wt pre and post.  Initial goal 3 kg, then had lower BP so reduced goal to 2.5 kg. Pt did net 2.7 kg off and feeling well. Noted -6.3% blood vol chg per crit line.      Assessment:  Stable run, cont to challenge EDW     Plan:    Next run per renal team

## 2018-06-23 NOTE — SUMMARY OF CARE
-Pt had high blood glucose levels this morning ranging from 308 to 440.  -Pt was treated with ordered sliding scale insulin without bringing the level down to a satisfactory level.  -Primary physicians group was informed and orders were received and carried out to increase the doseage of his oral diabetic agents and change his sliding scal novolog and consult with endocrine.  -Pt's noon time blood glucose level was down to 254 and by his final meal it was 309.

## 2018-06-24 LAB
ANION GAP SERPL CALCULATED.3IONS-SCNC: 13 MMOL/L (ref 3–14)
APTT PPP: 29 SEC (ref 22–37)
BUN SERPL-MCNC: 53 MG/DL (ref 7–30)
CALCIUM SERPL-MCNC: 9 MG/DL (ref 8.5–10.1)
CHLORIDE SERPL-SCNC: 93 MMOL/L (ref 94–109)
CO2 SERPL-SCNC: 24 MMOL/L (ref 20–32)
CREAT SERPL-MCNC: 4.69 MG/DL (ref 0.66–1.25)
ERYTHROCYTE [DISTWIDTH] IN BLOOD BY AUTOMATED COUNT: 17.1 % (ref 10–15)
GFR SERPL CREATININE-BSD FRML MDRD: 13 ML/MIN/1.7M2
GLUCOSE BLDC GLUCOMTR-MCNC: 256 MG/DL (ref 70–99)
GLUCOSE BLDC GLUCOMTR-MCNC: 271 MG/DL (ref 70–99)
GLUCOSE BLDC GLUCOMTR-MCNC: 323 MG/DL (ref 70–99)
GLUCOSE BLDC GLUCOMTR-MCNC: 438 MG/DL (ref 70–99)
GLUCOSE SERPL-MCNC: 298 MG/DL (ref 70–99)
HCT VFR BLD AUTO: 32.2 % (ref 40–53)
HGB BLD-MCNC: 10.4 G/DL (ref 13.3–17.7)
INR PPP: 1.07 (ref 0.86–1.14)
LACTATE BLD-SCNC: 1.4 MMOL/L (ref 0.7–2)
MCH RBC QN AUTO: 28.8 PG (ref 26.5–33)
MCHC RBC AUTO-ENTMCNC: 32.3 G/DL (ref 31.5–36.5)
MCV RBC AUTO: 89 FL (ref 78–100)
PLATELET # BLD AUTO: 270 10E9/L (ref 150–450)
POTASSIUM SERPL-SCNC: 4.5 MMOL/L (ref 3.4–5.3)
RBC # BLD AUTO: 3.61 10E12/L (ref 4.4–5.9)
SODIUM SERPL-SCNC: 131 MMOL/L (ref 133–144)
TACROLIMUS BLD-MCNC: 8.9 UG/L (ref 5–15)
TME LAST DOSE: NORMAL H
WBC # BLD AUTO: 16.7 10E9/L (ref 4–11)

## 2018-06-24 PROCEDURE — 25000131 ZZH RX MED GY IP 250 OP 636 PS 637: Mod: GY | Performed by: STUDENT IN AN ORGANIZED HEALTH CARE EDUCATION/TRAINING PROGRAM

## 2018-06-24 PROCEDURE — 25000132 ZZH RX MED GY IP 250 OP 250 PS 637: Mod: GY | Performed by: INTERNAL MEDICINE

## 2018-06-24 PROCEDURE — A9270 NON-COVERED ITEM OR SERVICE: HCPCS | Mod: GY | Performed by: THORACIC SURGERY (CARDIOTHORACIC VASCULAR SURGERY)

## 2018-06-24 PROCEDURE — A9270 NON-COVERED ITEM OR SERVICE: HCPCS | Mod: GY | Performed by: STUDENT IN AN ORGANIZED HEALTH CARE EDUCATION/TRAINING PROGRAM

## 2018-06-24 PROCEDURE — 00000146 ZZHCL STATISTIC GLUCOSE BY METER IP

## 2018-06-24 PROCEDURE — 25000132 ZZH RX MED GY IP 250 OP 250 PS 637: Performed by: INTERNAL MEDICINE

## 2018-06-24 PROCEDURE — 25000132 ZZH RX MED GY IP 250 OP 250 PS 637: Mod: GY | Performed by: PHYSICIAN ASSISTANT

## 2018-06-24 PROCEDURE — 99232 SBSQ HOSP IP/OBS MODERATE 35: CPT | Mod: GC | Performed by: INTERNAL MEDICINE

## 2018-06-24 PROCEDURE — 80197 ASSAY OF TACROLIMUS: CPT | Performed by: INTERNAL MEDICINE

## 2018-06-24 PROCEDURE — 25000132 ZZH RX MED GY IP 250 OP 250 PS 637: Performed by: STUDENT IN AN ORGANIZED HEALTH CARE EDUCATION/TRAINING PROGRAM

## 2018-06-24 PROCEDURE — 83605 ASSAY OF LACTIC ACID: CPT | Performed by: STUDENT IN AN ORGANIZED HEALTH CARE EDUCATION/TRAINING PROGRAM

## 2018-06-24 PROCEDURE — 36415 COLL VENOUS BLD VENIPUNCTURE: CPT | Performed by: STUDENT IN AN ORGANIZED HEALTH CARE EDUCATION/TRAINING PROGRAM

## 2018-06-24 PROCEDURE — 25000132 ZZH RX MED GY IP 250 OP 250 PS 637: Performed by: THORACIC SURGERY (CARDIOTHORACIC VASCULAR SURGERY)

## 2018-06-24 PROCEDURE — A9270 NON-COVERED ITEM OR SERVICE: HCPCS | Mod: GY | Performed by: PHYSICIAN ASSISTANT

## 2018-06-24 PROCEDURE — 80048 BASIC METABOLIC PNL TOTAL CA: CPT | Performed by: THORACIC SURGERY (CARDIOTHORACIC VASCULAR SURGERY)

## 2018-06-24 PROCEDURE — 25000131 ZZH RX MED GY IP 250 OP 636 PS 637: Mod: GY | Performed by: INTERNAL MEDICINE

## 2018-06-24 PROCEDURE — 85610 PROTHROMBIN TIME: CPT | Performed by: THORACIC SURGERY (CARDIOTHORACIC VASCULAR SURGERY)

## 2018-06-24 PROCEDURE — 40000802 ZZH SITE CHECK

## 2018-06-24 PROCEDURE — 21400006 ZZH R&B CCU INTERMEDIATE UMMC

## 2018-06-24 PROCEDURE — 25000125 ZZHC RX 250: Performed by: INTERNAL MEDICINE

## 2018-06-24 PROCEDURE — 36415 COLL VENOUS BLD VENIPUNCTURE: CPT | Performed by: THORACIC SURGERY (CARDIOTHORACIC VASCULAR SURGERY)

## 2018-06-24 PROCEDURE — A9270 NON-COVERED ITEM OR SERVICE: HCPCS | Mod: GY | Performed by: INTERNAL MEDICINE

## 2018-06-24 PROCEDURE — 87040 BLOOD CULTURE FOR BACTERIA: CPT | Performed by: STUDENT IN AN ORGANIZED HEALTH CARE EDUCATION/TRAINING PROGRAM

## 2018-06-24 PROCEDURE — 85027 COMPLETE CBC AUTOMATED: CPT | Performed by: THORACIC SURGERY (CARDIOTHORACIC VASCULAR SURGERY)

## 2018-06-24 PROCEDURE — 25000128 H RX IP 250 OP 636: Performed by: STUDENT IN AN ORGANIZED HEALTH CARE EDUCATION/TRAINING PROGRAM

## 2018-06-24 PROCEDURE — 85730 THROMBOPLASTIN TIME PARTIAL: CPT | Performed by: THORACIC SURGERY (CARDIOTHORACIC VASCULAR SURGERY)

## 2018-06-24 PROCEDURE — 25000132 ZZH RX MED GY IP 250 OP 250 PS 637: Mod: GY | Performed by: STUDENT IN AN ORGANIZED HEALTH CARE EDUCATION/TRAINING PROGRAM

## 2018-06-24 RX ORDER — SIMETHICONE 80 MG
80 TABLET,CHEWABLE ORAL EVERY 6 HOURS PRN
Status: DISCONTINUED | OUTPATIENT
Start: 2018-06-24 | End: 2018-07-02 | Stop reason: HOSPADM

## 2018-06-24 RX ADMIN — Medication 1 HALF-TAB: at 19:55

## 2018-06-24 RX ADMIN — PREDNISONE 20 MG: 20 TABLET ORAL at 19:55

## 2018-06-24 RX ADMIN — TERBUTALINE SULFATE 5 MG: 5 TABLET ORAL at 04:11

## 2018-06-24 RX ADMIN — SENNOSIDES AND DOCUSATE SODIUM 1 TABLET: 8.6; 5 TABLET ORAL at 08:38

## 2018-06-24 RX ADMIN — NYSTATIN 1000000 UNITS: 100000 SUSPENSION ORAL at 08:38

## 2018-06-24 RX ADMIN — SENNOSIDES AND DOCUSATE SODIUM 1 TABLET: 8.6; 5 TABLET ORAL at 19:54

## 2018-06-24 RX ADMIN — SIMETHICONE CHEW TAB 80 MG 80 MG: 80 TABLET ORAL at 23:49

## 2018-06-24 RX ADMIN — ISOSORBIDE DINITRATE 10 MG: 10 TABLET ORAL at 07:41

## 2018-06-24 RX ADMIN — BENZOCAINE AND MENTHOL 1 LOZENGE: 15; 3.6 LOZENGE ORAL at 21:48

## 2018-06-24 RX ADMIN — ALTEPLASE 1 MG: 2.2 INJECTION, POWDER, LYOPHILIZED, FOR SOLUTION INTRAVENOUS at 17:10

## 2018-06-24 RX ADMIN — TERBUTALINE SULFATE 5 MG: 5 TABLET ORAL at 12:27

## 2018-06-24 RX ADMIN — TACROLIMUS 3 MG: 1 CAPSULE ORAL at 08:37

## 2018-06-24 RX ADMIN — TRAMADOL HYDROCHLORIDE 50 MG: 50 TABLET, COATED ORAL at 21:48

## 2018-06-24 RX ADMIN — ISOSORBIDE DINITRATE 10 MG: 10 TABLET ORAL at 14:48

## 2018-06-24 RX ADMIN — HYDRALAZINE HYDROCHLORIDE 75 MG: 25 TABLET ORAL at 12:27

## 2018-06-24 RX ADMIN — PREDNISONE 20 MG: 20 TABLET ORAL at 08:39

## 2018-06-24 RX ADMIN — Medication 1 CAPSULE: at 08:39

## 2018-06-24 RX ADMIN — APIXABAN 2.5 MG: 2.5 TABLET, FILM COATED ORAL at 08:39

## 2018-06-24 RX ADMIN — ASPIRIN 81 MG: 81 TABLET, COATED ORAL at 08:38

## 2018-06-24 RX ADMIN — NYSTATIN 1000000 UNITS: 100000 SUSPENSION ORAL at 12:27

## 2018-06-24 RX ADMIN — GLIPIZIDE 5 MG: 5 TABLET, FILM COATED, EXTENDED RELEASE ORAL at 08:39

## 2018-06-24 RX ADMIN — HYDRALAZINE HYDROCHLORIDE 75 MG: 25 TABLET ORAL at 17:10

## 2018-06-24 RX ADMIN — ISOSORBIDE DINITRATE 10 MG: 10 TABLET ORAL at 21:47

## 2018-06-24 RX ADMIN — APIXABAN 2.5 MG: 2.5 TABLET, FILM COATED ORAL at 19:54

## 2018-06-24 RX ADMIN — PANTOPRAZOLE SODIUM 40 MG: 40 TABLET, DELAYED RELEASE ORAL at 12:41

## 2018-06-24 RX ADMIN — MYCOPHENOLATE MOFETIL 1500 MG: 250 CAPSULE ORAL at 17:10

## 2018-06-24 RX ADMIN — MYCOPHENOLATE MOFETIL 1500 MG: 250 CAPSULE ORAL at 08:38

## 2018-06-24 RX ADMIN — HYDRALAZINE HYDROCHLORIDE 75 MG: 25 TABLET ORAL at 23:50

## 2018-06-24 RX ADMIN — HYDRALAZINE HYDROCHLORIDE 75 MG: 25 TABLET ORAL at 04:02

## 2018-06-24 RX ADMIN — TERBUTALINE SULFATE 5 MG: 5 TABLET ORAL at 21:47

## 2018-06-24 RX ADMIN — PRAVASTATIN SODIUM 40 MG: 40 TABLET ORAL at 19:55

## 2018-06-24 RX ADMIN — BENZOCAINE AND MENTHOL 1 LOZENGE: 15; 3.6 LOZENGE ORAL at 12:41

## 2018-06-24 RX ADMIN — NYSTATIN 1000000 UNITS: 100000 SUSPENSION ORAL at 19:55

## 2018-06-24 RX ADMIN — NYSTATIN 1000000 UNITS: 100000 SUSPENSION ORAL at 17:09

## 2018-06-24 RX ADMIN — TACROLIMUS 3.5 MG: 1 CAPSULE ORAL at 17:10

## 2018-06-24 RX ADMIN — SIMETHICONE CHEW TAB 80 MG 80 MG: 80 TABLET ORAL at 17:09

## 2018-06-24 NOTE — PROGRESS NOTES
[facility], [place]    Sepsis Evaluation Progress Note    Date of Service: 06/23/2018    I was called to see Murray Nicholson due to abnormal vital signs triggering the Sepsis SIRS screening alert. He is not known to have an infection.     Physical Exam    Vital Signs:  Temp: 98.3  F (36.8  C) Temp src: Oral BP: 130/74 Pulse: 108 Heart Rate: 102 Resp: 18 SpO2: 100 % O2 Device: None (Room air)      Lab:  Lactic Acid   Date Value Ref Range Status   06/23/2018 2.0 0.7 - 2.0 mmol/L Final     Lactate for Sepsis Protocol   Date Value Ref Range Status   06/23/2018 2.0 (HH) 0.4 - 1.9 mmol/L Final     Comment:     Critical Value called to and read back by  ARNAV RAMIREZ RN ON 6C AT 1941 ON 06/23/18 BY KS         The patient is at baseline mental status.    The rest of their physical exam is significant for chest clear to auscultation bilaterally, skin appropriately warm and dry, able to answer questions appropriately, NTTP on abdominal exam.    Assessment and Plan    The SIRS and exam findings are likely due to recent dialysis and resultant negative fluid balance, there is no sign of sepsis at this time.    Disposition: The patient will remain on the current unit. We will continue to monitor this patient closely.    Yaz Campbell MD

## 2018-06-24 NOTE — PROGRESS NOTES
Cardiology Heart Failure Progress Note    Assessment & Plan   63 year old male with a PMH of CAD, ICM (EF of 15-20%), ESRD, bicuspid aortic valve with AI, severe MR, and proximal AAA who was admitted for decompensated heart failure. He underwent OHT on 6/15/2018.       Today's change  - cont PO tacro 3-3.5mg     - Hemodynamic support                        - isoproterenol off -> transitioned to terbutaline 5mg q8h HR 100s                        - Per Surgery team, to have SBP <130 due to visualized aortic aneurysm       - Immunosuppression                        - Mycophenolate 1500 mg bid (transition to PO 6/16), methylprednisolone -> prednisone 50 mg bid from 6/16- with continued taper now 20 mg bid                        - starting Tacro, now 1.5mg bid(6/16) ->1 mg bid(6/7): switch to iv tacro 6/20, 90 mcg/hr, 6/22 swithched to PO 3 mg given at evening, level 10.4. Continut 3-3.5mg    daily tacro levels     - Infection prophylaxis-  CMV/ recepient - / donor +,   EBV + / pending    High risk Donor - will need to check infection HIV, hepatitis panel and so forth in 1-2 months                        - Nystatin daily                        - Trimethoprim-sulfamethoxazole renally dosed                - valganciclovir x2/week as HD adjusted dosage     - CAV prophylaxis                        - Hold aspirin/rosuvastatin until improvement in systemic condition     # right IV thromus - started apixaban 2.5mg bid    # Chronic Medical Issues:  - Seborrheic keratoses / Dermatofibromas / Multiple benign nevi / Nummular dermatitis: derm consulted, all lesions benign  - Hx of allergic rhinitis: cetrizine  - ESRD: Dialysis T, Th, Sa   - Ascending aortic aneurysm: 4.5 x 4.5 cm proximal ascending aortic aneurysm seen on MRI 2/14  - Multiple benign branch duct-IPMNs: no concerning features affecting transplant candidacy  - gout: allopurinol, prednisone 6/26 - 5/31 (tapered for acute flare)    Discussed with Dr. Kulwant Griffiths  "Alexx   Cardiology fellow    Interval History     No acute events  HR in the 90's-100s with terbutaline 5mg  Feeling better.  [id number] in the morning but     Physical Exam   Temp: 97.7  F (36.5  C) Temp src: Oral BP: 132/78   Heart Rate: 98 Resp: 18 SpO2: 99 % O2 Device: None (Room air)    Vitals:    06/23/18 0845 06/23/18 1236 06/24/18 0500   Weight: 76.5 kg (168 lb 10.4 oz) 73.8 kg (162 lb 11.2 oz) 74.5 kg (164 lb 3.2 oz)     Vital Signs with Ranges  Temp:  [97.4  F (36.3  C)-98.3  F (36.8  C)] 97.7  F (36.5  C)  Heart Rate:  [] 98  Resp:  [18] 18  BP: ()/() 132/78  Cuff Mean (mmHg):  [] 85  SpO2:  [98 %-100 %] 99 %  I/O last 3 completed shifts:  In: 860 [P.O.:840; I.V.:20]  Out: 2700 [Other:2700]    Heart Rate: 98, Blood pressure 132/78, pulse 108, temperature 97.7  F (36.5  C), temperature source Oral, resp. rate 18, height 1.727 m (5' 8\"), weight 74.5 kg (164 lb 3.2 oz), SpO2 99 %.  164 lbs 3.2 oz  GEN:  NAD, eating breakfast   CV: tachycardic, no murmur, no S3 or S4  LUNGS:  Lungs with decreased breath sounds  ABD:  Active bowel sounds, soft, non-tender/non-distended.  No rebound/guarding/rigidity.  EXT:  No edema or cyanosis.      Medications     IV fluid REPLACEMENT ONLY       IV fluid REPLACEMENT ONLY       dextrose 5% and 0.45% NaCl 10 mL/hr at 06/15/18 0900     Reason beta blocker order not selected         apixaban ANTICOAGULANT  2.5 mg Oral BID     aspirin  81 mg Oral Daily     glipiZIDE  5 mg Oral Daily with breakfast     hydrALAZINE  75 mg Oral Q6H     insulin aspart  1-7 Units Subcutaneous TID AC     insulin aspart  1-5 Units Subcutaneous At Bedtime     isosorbide dinitrate  10 mg Oral Q8H     mycophenolate  1,500 mg Oral BID IS     NEPHROCAPS  1 capsule Oral Daily     nystatin  1,000,000 Units Swish & Swallow 4x Daily     pantoprazole  40 mg Oral QAM     pravastatin  40 mg Oral Daily     predniSONE  20 mg Oral BID     senna-docusate  1 tablet Oral BID     sodium chloride " (PF)  3 mL Intracatheter Q8H     sulfamethoxazole-trimethoprim  1 half-tab Oral QPM     tacrolimus  3 mg Oral QAM     tacrolimus  3.5 mg Oral QPM     terbutaline  5 mg Oral or Feeding Tube Q8H     valGANciclovir  450 mg Oral or Feeding Tube Once per day on Mon Thu       Data     Recent Labs  Lab 06/24/18  0514 06/23/18  2126 06/23/18  1919 06/23/18  0533 06/22/18  0608  06/18/18  0827   WBC 16.7* 14.1*  --  14.3* 14.3*  < > 14.7*   HGB 10.4* 10.8*  --  10.7* 10.6*  < > 9.5*   MCV 89 89  --  87 90  < > 91    263  --  273 262  < > 143*   INR 1.07  --   --  1.07 1.09  < > 1.02   * 130* 129* 130* 132*  < > 130*   POTASSIUM 4.5 3.7 4.0 4.8 4.4  < > 5.0   CHLORIDE 93* 94 93* 94 94  < > 94   CO2 24 22  --  20 22  < > 22   BUN 53* 42*  --  78* 50*  < > 52*   CR 4.69* 4.01*  --  6.45* 4.71*  < > 5.01*   ANIONGAP 13 14  --  16* 16*  < > 14   TRINI 9.0 9.0  --  9.3 9.2  < > 9.0   * 306* 352* 305* 440*  < > 113*   ALBUMIN  --  3.1*  --   --   --   --  3.3*   PROTTOTAL  --  7.0  --   --   --   --  6.9   BILITOTAL  --  0.6  --   --   --   --  0.8   ALKPHOS  --  136  --   --   --   --  114   ALT  --  26  --   --   --   --  34   AST  --  13  --   --   --   --  56*   < > = values in this interval not displayed.    No results found for this or any previous visit (from the past 24 hour(s)).           Discussed with John Cyr.    Tunde Coffey MD  Cardiology Fellow [phone]

## 2018-06-24 NOTE — PROGRESS NOTES
"CVTS Progress Note    63 year old male CAD, ICM (EF of 15-20%), ESRD on HD, bicuspid aortic valve with AI, severe MR, and proximal AAA who was admitted for decompensated heart failure 4/16/18 now s/p heart transplant on 6/14/18. 6/20 biopsy showed 1R cellular rejection    Pain well controlled, no issues since pacing wires removed yesterday. HD today. Some throat discomfort    /74 (BP Location: Right arm)  Pulse 108  Temp 98.3  F (36.8  C) (Oral)  Resp 18  Ht 1.727 m (5' 8\")  Wt 73.8 kg (162 lb 11.2 oz)  SpO2 100%  BMI 24.74 kg/m2  NAD  Incision well approximated without erythema edema or exudate    WBC 14 (14) (17) INR 1/07    Positive 750    Plan:  Immunosuppression per cardiology  HD per renal  Terbutaline 5 mg q8h  Continue to monitor WBC    Patient seen and discussed with CVTS fellow    Anamaria Poole MD PGY-3  "

## 2018-06-24 NOTE — PROGRESS NOTES
"CVTS Progress Note    63 year old male CAD, ICM (EF of 15-20%), ESRD on HD, bicuspid aortic valve with AI, severe MR, and proximal AAA who was admitted for decompensated heart failure 4/16/18 now s/p heart transplant on 6/14/18. 6/20 biopsy showed 1R cellular rejection    Pain well controlled, no issues since pacing wires removed yesterday. HD today. Some throat discomfort    /63 (BP Location: Right arm)  Pulse 108  Temp 97.6  F (36.4  C) (Oral)  Resp 18  Ht 1.727 m (5' 8\")  Wt 74.5 kg (164 lb 3.2 oz)  SpO2 100%  BMI 24.97 kg/m2  NAD  Incision well approximated without erythema edema or exudate    WBC 16 (14)  (14) (17) INR 1.07    Positive -1840    Plan:  Immunosuppression per cardiology  HD per renal  Terbutaline 5 mg q8h  Continue to monitor WBC, please hold lactate checks.     Patient seen and discussed with CVTS fellow    Anamaria Poole MD PGY-3  "

## 2018-06-24 NOTE — PROGRESS NOTES
Patient was seen and evaluated today on 6/23/2018, please see separate progress note for details.  Patient is doing well overall complains of some headache this morning that responded reasonably well to Tylenol.  Denies any other complaints and did have dialysis session again today.  At this point he remains hypotensive then we will continue to uptitrate his afterload reduction medications and in an attempt to control his blood pressure.  Tacrolimus level is 10.4 this morning we will continue current regimen.  John Minaya

## 2018-06-24 NOTE — PLAN OF CARE
Problem: Patient Care Overview  Goal: Plan of Care/Patient Progress Review  RN:  1.Pt will remain hemodynamically stable.  2.BG WNL.   SR/ST. VSS with exception to BP; Hypertensive pre dialysis, BP recovered and WNL post dialysis. 2.7kg off. Triggered sepsis protocol, awaiting lactic acid result. BG elevated 290-315, SSI given as ordered. Tylenol given x2 for headache.   P- Continue to monitor and notify team of changes.

## 2018-06-24 NOTE — PROGRESS NOTES
Cardiology Heart Failure Progress Note    Assessment & Plan   63 year old male with a PMH of CAD, ICM (EF of 15-20%), ESRD, bicuspid aortic valve with AI, severe MR, and proximal AAA who was admitted for decompensated heart failure. He underwent OHT on 7/15/2018.       Today's change  - check tacro level at 8.9 will continue same dose for now  - monitor for infection given rising leukocytosis    - Hemodynamic support                        - isoproterenol off -> transitioned to terbutaline 5mg q8h HR in the 90's                        - Per Surgery team, to have SBP <130 due to visualized aortic aneurysm       - Immunosuppression                        - Mycophenolate 1500 mg bid (transition to PO 6/16), methylprednisolone -> prednisone 50 mg bid from 6/16- with continued taper now 30 mg bid and currently at 25mg BID                        - starting Tacro,1.5mg bid(6/16) ->1 mg bid(6/7): switch to iv tacro 6/20-> 3/3.5mg      - Infection prophylaxis-  CMV/ recepient - / donor +,   EBV + / pending    High risk Donor - will need to check infection HIV, hepatitis panel and so forth in 1-2 months                        - Nystatin daily                        - Trimethoprim-sulfamethoxazole renally dosed                - valganciclovir x2/week as HD adjusted dosage     - CAV prophylaxis                        - Hold aspirin/rosuvastatin until improvement in systemic condition     # right IV thromus    # Chronic Medical Issues:  - Seborrheic keratoses / Dermatofibromas / Multiple benign nevi / Nummular dermatitis: derm consulted, all lesions benign  - Hx of allergic rhinitis: cetrizine  - ESRD: Dialysis T, Th, Sa   - Ascending aortic aneurysm: 4.5 x 4.5 cm proximal ascending aortic aneurysm seen on MRI 2/14  - Multiple benign branch duct-IPMNs: no concerning features affecting transplant candidacy  - gout: allopurinol, prednisone 6/26 - 5/31 (tapered for acute flare)    Discussed with Dr. Minaya.    Roxanna Zapata  "Bo   Cardiology fellow    Interval History     No acute events  WBC up to 16 this am   Isordil 10 TID HR in the 90's -150/90's afebrile  LA 2.2->1.4  On bactrim, valcyte, tacro (level today 8.9)     Physical Exam   Temp: 98  F (36.7  C) Temp src: Oral BP: 101/63   Heart Rate: 103 Resp: 20 SpO2: 98 % O2 Device: None (Room air)    Vitals:    06/23/18 0845 06/23/18 1236 06/24/18 0500   Weight: 76.5 kg (168 lb 10.4 oz) 73.8 kg (162 lb 11.2 oz) 74.5 kg (164 lb 3.2 oz)     Vital Signs with Ranges  Temp:  [97.4  F (36.3  C)-98.3  F (36.8  C)] 98  F (36.7  C)  Heart Rate:  [] 103  Resp:  [18-20] 20  BP: ()/() 101/63  Cuff Mean (mmHg):  [74-85] 74  SpO2:  [98 %-100 %] 98 %  I/O last 3 completed shifts:  In: 960 [P.O.:940; I.V.:20]  Out: -     Heart Rate: 103, Blood pressure 101/63, pulse 108, temperature 98  F (36.7  C), temperature source Oral, resp. rate 20, height 1.727 m (5' 8\"), weight 74.5 kg (164 lb 3.2 oz), SpO2 98 %.  164 lbs 3.2 oz  GEN:  NAD, eating breakfast   CV: tachycardic, no murmur, no S3 or S4  LUNGS:  Lungs with decreased breath sounds  ABD:  Active bowel sounds, soft, non-tender/non-distended.  No rebound/guarding/rigidity.  EXT:  No edema or cyanosis.      Medications     IV fluid REPLACEMENT ONLY       IV fluid REPLACEMENT ONLY       dextrose 5% and 0.45% NaCl 10 mL/hr at 06/15/18 0900     Reason beta blocker order not selected         alteplase  1 mg Intravenous Once     apixaban ANTICOAGULANT  2.5 mg Oral BID     aspirin  81 mg Oral Daily     glipiZIDE  5 mg Oral Daily with breakfast     hydrALAZINE  75 mg Oral Q6H     insulin aspart  1-7 Units Subcutaneous TID AC     insulin aspart  1-5 Units Subcutaneous At Bedtime     isosorbide dinitrate  10 mg Oral Q8H     mycophenolate  1,500 mg Oral BID IS     NEPHROCAPS  1 capsule Oral Daily     nystatin  1,000,000 Units Swish & Swallow 4x Daily     pantoprazole  40 mg Oral QAM     pravastatin  40 mg Oral Daily     predniSONE  20 mg " Oral BID     senna-docusate  1 tablet Oral BID     sodium chloride (PF)  3 mL Intracatheter Q8H     sulfamethoxazole-trimethoprim  1 half-tab Oral QPM     tacrolimus  3 mg Oral QAM     tacrolimus  3.5 mg Oral QPM     terbutaline  5 mg Oral or Feeding Tube Q8H     valGANciclovir  450 mg Oral or Feeding Tube Once per day on Mon Thu       Data     Recent Labs  Lab 06/24/18  0514 06/23/18  2126 06/23/18  1919 06/23/18  0533 06/22/18  0608  06/18/18  0827   WBC 16.7* 14.1*  --  14.3* 14.3*  < > 14.7*   HGB 10.4* 10.8*  --  10.7* 10.6*  < > 9.5*   MCV 89 89  --  87 90  < > 91    263  --  273 262  < > 143*   INR 1.07  --   --  1.07 1.09  < > 1.02   * 130* 129* 130* 132*  < > 130*   POTASSIUM 4.5 3.7 4.0 4.8 4.4  < > 5.0   CHLORIDE 93* 94 93* 94 94  < > 94   CO2 24 22  --  20 22  < > 22   BUN 53* 42*  --  78* 50*  < > 52*   CR 4.69* 4.01*  --  6.45* 4.71*  < > 5.01*   ANIONGAP 13 14  --  16* 16*  < > 14   TRINI 9.0 9.0  --  9.3 9.2  < > 9.0   * 306* 352* 305* 440*  < > 113*   ALBUMIN  --  3.1*  --   --   --   --  3.3*   PROTTOTAL  --  7.0  --   --   --   --  6.9   BILITOTAL  --  0.6  --   --   --   --  0.8   ALKPHOS  --  136  --   --   --   --  114   ALT  --  26  --   --   --   --  34   AST  --  13  --   --   --   --  56*   < > = values in this interval not displayed.    No results found for this or any previous visit (from the past 24 hour(s)).

## 2018-06-24 NOTE — PLAN OF CARE
Problem: Patient Care Overview  Goal: Plan of Care/Patient Progress Review  RN:  1.Pt will remain hemodynamically stable.  2.BG WNL.   Outcome: Improving  D: Pt with h/y of CAD,ICM, (EF 15-20%), severe MR, prox AAA,DHF,   ESRD on HD, DHF, DM 2,  OHT on 6/14/18 per MD note. Biopsy -1 R cellular rejection.   I: Monitored vitals and assessed pt status.   PRN: Tramadol for incisional pain with effect.   Cepacol lozenge prn given throat discomfort. .    A: A0x4. VSS, on RA. SR/-103. . Afebrile. Pain controlled with Tramadol prn.   BM X 1 . Chest incision WNL , no bleeding observed,healing.  Blood sugar 271. LA 2.0 around 19 p.m. MD cross cover notified, assessment done by MD, no fluid resuscitation order for sepsis at this time per MD.  VSS,afebrile, recheck of LA at 23 p.m ordered by MD. LA 2.2 at 23:30 p.m,triggered sepsis protocol,pt stable,SR/,103, T 97.4,  /86,  WBC 14.1, no SOB,no pain reported. MD cross cover notified. Blood culture and LA recheck ordered for 2 a.m by MD. No order for a/b ,no fluid resuscitation for sepsis at this time by MD order.  LA recheck 1.4 at 1:50 a.m.    Temp:  [97.4  F (36.3  C)-98.3  F (36.8  C)] 97.4  F (36.3  C)  Heart Rate:  [] 102  Resp:  [16-20] 18  BP: ()/() 118/68  Cuff Mean (mmHg):  [] 85  SpO2:  [98 %-100 %] 98 %      P: Continue to monitor Pt status and report changes to treatment team.

## 2018-06-25 ENCOUNTER — APPOINTMENT (OUTPATIENT)
Dept: GENERAL RADIOLOGY | Facility: CLINIC | Age: 63
DRG: 001 | End: 2018-06-25
Attending: PHYSICIAN ASSISTANT
Payer: COMMERCIAL

## 2018-06-25 ENCOUNTER — APPOINTMENT (OUTPATIENT)
Dept: OCCUPATIONAL THERAPY | Facility: CLINIC | Age: 63
DRG: 001 | End: 2018-06-25
Attending: INTERNAL MEDICINE
Payer: COMMERCIAL

## 2018-06-25 LAB
ALBUMIN UR-MCNC: 30 MG/DL
ANION GAP SERPL CALCULATED.3IONS-SCNC: 18 MMOL/L (ref 3–14)
APPEARANCE UR: ABNORMAL
APTT PPP: 29 SEC (ref 22–37)
BACTERIA #/AREA URNS HPF: ABNORMAL /HPF
BILIRUB UR QL STRIP: NEGATIVE
BUN SERPL-MCNC: 82 MG/DL (ref 7–30)
C DIFF TOX B STL QL: NEGATIVE
CALCIUM SERPL-MCNC: 8.8 MG/DL (ref 8.5–10.1)
CHLORIDE SERPL-SCNC: 93 MMOL/L (ref 94–109)
CO2 SERPL-SCNC: 19 MMOL/L (ref 20–32)
COLOR UR AUTO: YELLOW
CREAT SERPL-MCNC: 6.5 MG/DL (ref 0.66–1.25)
ERYTHROCYTE [DISTWIDTH] IN BLOOD BY AUTOMATED COUNT: 17.3 % (ref 10–15)
GFR SERPL CREATININE-BSD FRML MDRD: 9 ML/MIN/1.7M2
GLUCOSE BLDC GLUCOMTR-MCNC: 256 MG/DL (ref 70–99)
GLUCOSE BLDC GLUCOMTR-MCNC: 269 MG/DL (ref 70–99)
GLUCOSE BLDC GLUCOMTR-MCNC: 364 MG/DL (ref 70–99)
GLUCOSE BLDC GLUCOMTR-MCNC: 384 MG/DL (ref 70–99)
GLUCOSE SERPL-MCNC: 300 MG/DL (ref 70–99)
GLUCOSE UR STRIP-MCNC: 300 MG/DL
HCT VFR BLD AUTO: 30 % (ref 40–53)
HGB BLD-MCNC: 10 G/DL (ref 13.3–17.7)
HGB UR QL STRIP: NEGATIVE
INR PPP: 1.07 (ref 0.86–1.14)
KETONES UR STRIP-MCNC: NEGATIVE MG/DL
LACTATE BLD-SCNC: 1.3 MMOL/L (ref 0.4–1.9)
LDH SERPL L TO P-CCNC: 271 U/L (ref 85–227)
LEUKOCYTE ESTERASE UR QL STRIP: ABNORMAL
MCH RBC QN AUTO: 29.2 PG (ref 26.5–33)
MCHC RBC AUTO-ENTMCNC: 33.3 G/DL (ref 31.5–36.5)
MCV RBC AUTO: 88 FL (ref 78–100)
MUCOUS THREADS #/AREA URNS LPF: PRESENT /LPF
NITRATE UR QL: NEGATIVE
PH UR STRIP: 5 PH (ref 5–7)
PLATELET # BLD AUTO: 282 10E9/L (ref 150–450)
POTASSIUM SERPL-SCNC: 5 MMOL/L (ref 3.4–5.3)
RBC # BLD AUTO: 3.42 10E12/L (ref 4.4–5.9)
RBC #/AREA URNS AUTO: 3 /HPF (ref 0–2)
SODIUM SERPL-SCNC: 129 MMOL/L (ref 133–144)
SOURCE: ABNORMAL
SP GR UR STRIP: 1.01 (ref 1–1.03)
SPECIMEN SOURCE: NORMAL
SQUAMOUS #/AREA URNS AUTO: 3 /HPF (ref 0–1)
TACROLIMUS BLD-MCNC: 8.6 UG/L (ref 5–15)
TME LAST DOSE: NORMAL H
TRANS CELLS #/AREA URNS HPF: 1 /HPF (ref 0–1)
UROBILINOGEN UR STRIP-MCNC: NORMAL MG/DL (ref 0–2)
WBC # BLD AUTO: 19.9 10E9/L (ref 4–11)
WBC #/AREA URNS AUTO: 9 /HPF (ref 0–5)

## 2018-06-25 PROCEDURE — 25000131 ZZH RX MED GY IP 250 OP 636 PS 637: Mod: GY | Performed by: STUDENT IN AN ORGANIZED HEALTH CARE EDUCATION/TRAINING PROGRAM

## 2018-06-25 PROCEDURE — 25000132 ZZH RX MED GY IP 250 OP 250 PS 637: Mod: GY | Performed by: INTERNAL MEDICINE

## 2018-06-25 PROCEDURE — 40000133 ZZH STATISTIC OT WARD VISIT

## 2018-06-25 PROCEDURE — 81001 URINALYSIS AUTO W/SCOPE: CPT | Performed by: PHYSICIAN ASSISTANT

## 2018-06-25 PROCEDURE — 25000132 ZZH RX MED GY IP 250 OP 250 PS 637: Performed by: PHYSICIAN ASSISTANT

## 2018-06-25 PROCEDURE — 40000802 ZZH SITE CHECK

## 2018-06-25 PROCEDURE — 80197 ASSAY OF TACROLIMUS: CPT | Performed by: INTERNAL MEDICINE

## 2018-06-25 PROCEDURE — A9270 NON-COVERED ITEM OR SERVICE: HCPCS | Mod: GY | Performed by: STUDENT IN AN ORGANIZED HEALTH CARE EDUCATION/TRAINING PROGRAM

## 2018-06-25 PROCEDURE — A9270 NON-COVERED ITEM OR SERVICE: HCPCS | Mod: GY | Performed by: PHYSICIAN ASSISTANT

## 2018-06-25 PROCEDURE — 25000131 ZZH RX MED GY IP 250 OP 636 PS 637: Mod: GY | Performed by: INTERNAL MEDICINE

## 2018-06-25 PROCEDURE — 83615 LACTATE (LD) (LDH) ENZYME: CPT | Performed by: THORACIC SURGERY (CARDIOTHORACIC VASCULAR SURGERY)

## 2018-06-25 PROCEDURE — 21400006 ZZH R&B CCU INTERMEDIATE UMMC

## 2018-06-25 PROCEDURE — A9270 NON-COVERED ITEM OR SERVICE: HCPCS | Mod: GY | Performed by: THORACIC SURGERY (CARDIOTHORACIC VASCULAR SURGERY)

## 2018-06-25 PROCEDURE — 71046 X-RAY EXAM CHEST 2 VIEWS: CPT

## 2018-06-25 PROCEDURE — 25000132 ZZH RX MED GY IP 250 OP 250 PS 637: Performed by: STUDENT IN AN ORGANIZED HEALTH CARE EDUCATION/TRAINING PROGRAM

## 2018-06-25 PROCEDURE — 85610 PROTHROMBIN TIME: CPT | Performed by: THORACIC SURGERY (CARDIOTHORACIC VASCULAR SURGERY)

## 2018-06-25 PROCEDURE — 25000125 ZZHC RX 250: Performed by: INTERNAL MEDICINE

## 2018-06-25 PROCEDURE — 83605 ASSAY OF LACTIC ACID: CPT | Performed by: INTERNAL MEDICINE

## 2018-06-25 PROCEDURE — 80048 BASIC METABOLIC PNL TOTAL CA: CPT | Performed by: THORACIC SURGERY (CARDIOTHORACIC VASCULAR SURGERY)

## 2018-06-25 PROCEDURE — 87493 C DIFF AMPLIFIED PROBE: CPT | Performed by: PHYSICIAN ASSISTANT

## 2018-06-25 PROCEDURE — 36415 COLL VENOUS BLD VENIPUNCTURE: CPT | Performed by: INTERNAL MEDICINE

## 2018-06-25 PROCEDURE — 40000558 ZZH STATISTIC CVC DRESSING CHANGE

## 2018-06-25 PROCEDURE — 99024 POSTOP FOLLOW-UP VISIT: CPT | Performed by: INTERNAL MEDICINE

## 2018-06-25 PROCEDURE — 97110 THERAPEUTIC EXERCISES: CPT | Mod: GO

## 2018-06-25 PROCEDURE — 36415 COLL VENOUS BLD VENIPUNCTURE: CPT | Performed by: THORACIC SURGERY (CARDIOTHORACIC VASCULAR SURGERY)

## 2018-06-25 PROCEDURE — 00000146 ZZHCL STATISTIC GLUCOSE BY METER IP

## 2018-06-25 PROCEDURE — 25000132 ZZH RX MED GY IP 250 OP 250 PS 637: Performed by: THORACIC SURGERY (CARDIOTHORACIC VASCULAR SURGERY)

## 2018-06-25 PROCEDURE — 85027 COMPLETE CBC AUTOMATED: CPT | Performed by: THORACIC SURGERY (CARDIOTHORACIC VASCULAR SURGERY)

## 2018-06-25 PROCEDURE — 74019 RADEX ABDOMEN 2 VIEWS: CPT

## 2018-06-25 PROCEDURE — A9270 NON-COVERED ITEM OR SERVICE: HCPCS | Mod: GY | Performed by: INTERNAL MEDICINE

## 2018-06-25 PROCEDURE — 25000132 ZZH RX MED GY IP 250 OP 250 PS 637: Performed by: INTERNAL MEDICINE

## 2018-06-25 PROCEDURE — 85730 THROMBOPLASTIN TIME PARTIAL: CPT | Performed by: THORACIC SURGERY (CARDIOTHORACIC VASCULAR SURGERY)

## 2018-06-25 PROCEDURE — 97535 SELF CARE MNGMENT TRAINING: CPT | Mod: GO

## 2018-06-25 RX ORDER — HYDRALAZINE HYDROCHLORIDE 100 MG/1
100 TABLET, FILM COATED ORAL EVERY 6 HOURS
Status: DISCONTINUED | OUTPATIENT
Start: 2018-06-25 | End: 2018-07-02 | Stop reason: HOSPADM

## 2018-06-25 RX ORDER — VALGANCICLOVIR 450 MG/1
450 TABLET, FILM COATED ORAL
Status: DISCONTINUED | OUTPATIENT
Start: 2018-06-28 | End: 2018-07-02 | Stop reason: HOSPADM

## 2018-06-25 RX ADMIN — PREDNISONE 20 MG: 20 TABLET ORAL at 08:05

## 2018-06-25 RX ADMIN — APIXABAN 2.5 MG: 2.5 TABLET, FILM COATED ORAL at 20:29

## 2018-06-25 RX ADMIN — TERBUTALINE SULFATE 5 MG: 5 TABLET ORAL at 20:32

## 2018-06-25 RX ADMIN — Medication 1 CAPSULE: at 08:04

## 2018-06-25 RX ADMIN — TACROLIMUS 3 MG: 1 CAPSULE ORAL at 08:05

## 2018-06-25 RX ADMIN — HYDRALAZINE HYDROCHLORIDE 75 MG: 25 TABLET ORAL at 05:49

## 2018-06-25 RX ADMIN — MYCOPHENOLATE MOFETIL 1500 MG: 250 CAPSULE ORAL at 08:06

## 2018-06-25 RX ADMIN — SIMETHICONE CHEW TAB 80 MG 80 MG: 80 TABLET ORAL at 08:05

## 2018-06-25 RX ADMIN — TERBUTALINE SULFATE 5 MG: 5 TABLET ORAL at 05:49

## 2018-06-25 RX ADMIN — TACROLIMUS 3.5 MG: 1 CAPSULE ORAL at 18:29

## 2018-06-25 RX ADMIN — ISOSORBIDE DINITRATE 10 MG: 10 TABLET ORAL at 05:49

## 2018-06-25 RX ADMIN — HYDRALAZINE HYDROCHLORIDE 100 MG: 100 TABLET ORAL at 23:49

## 2018-06-25 RX ADMIN — GLIPIZIDE 5 MG: 5 TABLET, FILM COATED, EXTENDED RELEASE ORAL at 08:04

## 2018-06-25 RX ADMIN — MYCOPHENOLATE MOFETIL 1500 MG: 250 CAPSULE ORAL at 18:29

## 2018-06-25 RX ADMIN — NYSTATIN 1000000 UNITS: 100000 SUSPENSION ORAL at 13:47

## 2018-06-25 RX ADMIN — HYDRALAZINE HYDROCHLORIDE 75 MG: 25 TABLET ORAL at 13:46

## 2018-06-25 RX ADMIN — TERBUTALINE SULFATE 5 MG: 5 TABLET ORAL at 13:47

## 2018-06-25 RX ADMIN — ASPIRIN 81 MG: 81 TABLET, COATED ORAL at 08:05

## 2018-06-25 RX ADMIN — ISOSORBIDE DINITRATE 10 MG: 10 TABLET ORAL at 13:47

## 2018-06-25 RX ADMIN — SIMETHICONE CHEW TAB 80 MG 80 MG: 80 TABLET ORAL at 14:51

## 2018-06-25 RX ADMIN — NYSTATIN 1000000 UNITS: 100000 SUSPENSION ORAL at 20:29

## 2018-06-25 RX ADMIN — PRAVASTATIN SODIUM 40 MG: 40 TABLET ORAL at 20:29

## 2018-06-25 RX ADMIN — SIMETHICONE CHEW TAB 80 MG 80 MG: 80 TABLET ORAL at 20:37

## 2018-06-25 RX ADMIN — VALGANCICLOVIR HYDROCHLORIDE 450 MG: 50 POWDER, FOR SOLUTION ORAL at 08:12

## 2018-06-25 RX ADMIN — ISOSORBIDE DINITRATE 10 MG: 10 TABLET ORAL at 21:59

## 2018-06-25 RX ADMIN — NYSTATIN 1000000 UNITS: 100000 SUSPENSION ORAL at 16:30

## 2018-06-25 RX ADMIN — HYDRALAZINE HYDROCHLORIDE 100 MG: 100 TABLET ORAL at 18:29

## 2018-06-25 RX ADMIN — PANTOPRAZOLE SODIUM 40 MG: 40 TABLET, DELAYED RELEASE ORAL at 08:05

## 2018-06-25 RX ADMIN — PREDNISONE 20 MG: 20 TABLET ORAL at 20:29

## 2018-06-25 RX ADMIN — Medication 1 HALF-TAB: at 20:32

## 2018-06-25 RX ADMIN — TRAMADOL HYDROCHLORIDE 50 MG: 50 TABLET, COATED ORAL at 21:54

## 2018-06-25 RX ADMIN — NYSTATIN 1000000 UNITS: 100000 SUSPENSION ORAL at 08:06

## 2018-06-25 RX ADMIN — APIXABAN 2.5 MG: 2.5 TABLET, FILM COATED ORAL at 08:05

## 2018-06-25 NOTE — PROGRESS NOTES
Care Coordinator - Discharge Planning    Admission Date/Time:  4/16/2018  Attending MD:  Rony Caputo   Data  Date of initial CC assessment:  6/20/18, 6/22/18  Chart reviewed, discussed with interdisciplinary team.   Patient was admitted for:   1. Chronic systolic heart failure (H)    2. End stage renal disease (H)    3. Heart transplant recipient (H)    Assessment   Full assessment completed in previous note   Concerns with insurance coverage for discharge needs: none.   Current Living Situation: Patient said that his two adult sons will be staying with him.   Support System: Supportive and Involved sons: Clara, also friend Naresh Peck.   Services Involved: [facility]  Transportation at Discharge: Family or friend will provide  Transportation to Medical Appointments: family to provide.   Barriers to Discharge: post-op recovery.     Coordination of Care and Referrals: Provided patient/family with options for resumption of outpt dialysis.   Assessment  OT is recommending OPCR; pt is in agreement with this plan.   Per MD, pt is not yet medically ready for discharge. Jacquelin at [facility] updated on pt's potential discharge date.   Intervention:   Arrangements made with [facility] (Ph: [phone] Fax: [phone]) for restarting of outpt dialysis on T-Th-Sat at 11 AM; potentially on 6/30/18.    Plan  Anticipated Discharge Date:  TBD  Anticipated Discharge Plan:  Discharge to home with OPCR and resumption of outpt dialysis.     JONATHAN BULLARD RN BSN  Care Coordinator Unit   [phone]

## 2018-06-25 NOTE — PLAN OF CARE
Problem: Patient Care Overview  Goal: Plan of Care/Patient Progress Review  RN:  1.Pt will remain hemodynamically stable.  2.BG WNL.   D/I/A: Pt here s/p OHT 6/14. SR, RA, independent, BP stable. CVTS aware that pt would like walker at d/c. Simethicone given for stomach discomfort. C-diff/UA sent.  P: Continue to monitor.

## 2018-06-25 NOTE — PROGRESS NOTES
CARDIOTHORACIC SURGERY PROGRESS NOTE  06/25/2018      SUBJECTIVE:    No acute issues over night.   Does report some bloating and nausea, having multiple loose stools overnight, no emesis. Otherwise no acute complaints  Patient denies SOB, CP, fever, chills, sweats.     Patient has been tolerating diet. BM x3 overnight.  ambulating in halls. Sinus tachy.       OBJECTIVE:   Temp:  [97.5  F (36.4  C)-98.4  F (36.9  C)] 97.7  F (36.5  C)  Heart Rate:  [] 100  Resp:  [18] 18  BP: (122-146)/(74-84) 124/75  Cuff Mean (mmHg):  [86-92] 91  SpO2:  [97 %-100 %] 98 %    Gen: A&Ox3, NAD   CV: RRR to tachycardic, normal S1, S2, no murmurs, rubs or gallops   Pulm: Lungs CTA, no wheezing or rhonchi  Abd: Soft, NT, ND, +BS  Ext: Trace LE edema  Neuro: Nonfocal  Incision: c/d/i, no erythema, sternum stable  Tubes/drains: Dressing clean and dry    I&O's:  I/O last 3 completed shifts:  In: 540 [P.O.:540]  Out: -     Labs:   BMP  Recent Labs  Lab 06/25/18 0509 06/24/18 0514 06/23/18 2126 06/23/18 1919 06/23/18  0533   * 131* 130* 129* 130*   POTASSIUM 5.0 4.5 3.7 4.0 4.8   CHLORIDE 93* 93* 94 93* 94   TRINI 8.8 9.0 9.0  --  9.3   CO2 19* 24 22  --  20   BUN 82* 53* 42*  --  78*   CR 6.50* 4.69* 4.01*  --  6.45*   * 298* 306* 352* 305*     CBC  Recent Labs  Lab 06/25/18 0509 06/24/18 0514 06/23/18 2126 06/23/18  0533   WBC 19.9* 16.7* 14.1* 14.3*   RBC 3.42* 3.61* 3.72* 3.66*   HGB 10.0* 10.4* 10.8* 10.7*   HCT 30.0* 32.2* 33.2* 31.8*   MCV 88 89 89 87   MCH 29.2 28.8 29.0 29.2   MCHC 33.3 32.3 32.5 33.6   RDW 17.3* 17.1* 17.1* 17.1*    270 263 273     INR  Recent Labs  Lab 06/25/18  0509 06/24/18  0514 06/23/18  0533 06/22/18  0608   INR 1.07 1.07 1.07 1.09      Hepatic Panel   Recent Labs  Lab 06/23/18  2126   AST 13   ALT 26   ALKPHOS 136   BILITOTAL 0.6   ALBUMIN 3.1*     Glucose  Recent Labs  Lab 06/25/18  0739 06/25/18  0509 06/24/18  2059 06/24/18  1908 06/24/18  1451 06/24/18  0752 06/24/18  0514  06/23/18  2203 06/23/18  2126 06/23/18  1919  06/23/18  0533  06/22/18  0608   GLC  --  300*  --   --   --   --  298*  --  306* 352*  --  305*  --  440*   *  --  256* 323* 438* 271*  --  271*  --   --   < >  --   < >  --    < > = values in this interval not displayed.    Imaging:   No results found for this or any previous visit (from the past 24 hour(s)).      ASSESSMENT/PLAN: Patient is a 63 year old male with a history of CAD, ICM (EF of 15-20%), ESRD on HD, bicuspid aortic valve with AI, severe MR, and proximal AAA who was admitted for decompensated heart failure 4/16/18 now s/p heart transplant on 6/14/18. 6/20 biopsy showed 1R cellular rejection.    Neuro:  -Acute post-op pain: IV dilaudid, tylenol and oxycodone prn    CV:  - ASA 81 mg, pravastatin.  - Blood pressure (goal SBP less than 130 given aortic aneurysm): borderline controlled, on hydralazine 75 mg qid, isordil 10 mg q8h started over weekend  - TPW have been removed     Resp: extubated POD 2  - IS, encourage activity  - All chest tubes have been removed, CXR with stable pneumoperitoneum        FEN/GI:   - regular diet, hold bowel regimen, Multiple BM    Renal:   - ESRD on dialsysis, on iHD, scheduled 6/26  - Volume status: dry to slightly hypervolemic, recommend pulling fluid with next HD run      ID: Completed daniella-op abx,   - WBC 16->19.9, afebrile, Is having some loose stools, will check c.diff, UA, CXR/AXR 6/25, hold on antibiotics at this time  - Immunosuppression per cards    Heme:   - Hgb stable, no signs or symptoms of bleeding      Endo:   - BG consistently above 200, on SSI started lantus 8U 6/25, will consult endocrine      PPx:   - PPI      Anticoagulation:   - Apixaban for right IJ thrombus       Dispo:   - 6C since 6/17    Staff surgeons have been informed of changes through both verbal and written communication.         Marquis Villagomez PA-C  Cardiothoracic Surgery  June 25, 2018 11:58 AM   p: [phone]

## 2018-06-25 NOTE — PROGRESS NOTES
Per protocol please check week one post tx DSAs.    Thank you,    Lillie Ulloa, RN  Post Heart Transplant

## 2018-06-25 NOTE — PLAN OF CARE
Problem: Patient Care Overview  Goal: Plan of Care/Patient Progress Review  RN:  1.Pt will remain hemodynamically stable.  2.BG WNL.   Outcome: Improving  D: Pt with h/y of CAD,ICM, (EF 15-20%), severe MR, prox AAA,DHF,  ESRD on HD, DM 2, OHT on 6/14/18 per MD note. Biopsy -1 R cellular rejection    I: Monitored vitals and assessed pt status.   PRN: Tramadol for incisional pain. Simethicone for abdominal discomfort with effect.     A: A0x4. VSS, SR/ST. on RA. Afebrile. Pain controlled with Tramadol. BM X1. Sternal, and old CT site incisions healing,no drainage observed, open to air.  Blood sugar 256 .  WBC 19.9. INR 1.07. Novolog given per scale.   LA 1.3 at 630 a.m.     Temp:  [97.5  F (36.4  C)-98.4  F (36.9  C)] 97.5  F (36.4  C)  Heart Rate:  [] 103  Resp:  [18-20] 18  BP: (101-150)/(63-93) 122/75  Cuff Mean (mmHg):  [74-86] 86  SpO2:  [98 %-100 %] 98 %      P: Continue to monitor Pt status and report changes to treatment team.  Pt wants to see Endocrinologist regarding his  BS and wants HD today, passed to Day RN.

## 2018-06-25 NOTE — PROGRESS NOTES
"      Advanced Heart Failure and Transplant Cardiology  Consult Note    Assessment and Plan:  63 year old male with a PMH of CAD, ICM (EF of 15-20%), ESRD, bicuspid aortic valve with AI, severe MR, and proximal AAA who was admitted mid-April for decompensated heart failure. He underwent OHT on 6/15/2018.        # Immunosuppression: Mycophenolate 1500 mg BID, prednisone 20 mg BID, Tacrolimus 3 mg / 3.5mg   - goal Tacro 8 - 10 ug/L until infectious source ruled out (then back to 10 - 15 ug/L)    # Prophylaxis: CMV (- / +),   EBV (+ / pending): Nystatin, Valcyte, bactrim, asa 81 mg daily, Pravastatin 40 mg daily, next biopsy Wednesday 6/27    He continues to have mild increase in his WBC. Clinically, he endorses \"soft stools\" but no diarrhea nor abdominal pain. It would be worthwhile to check clostridium in the stool as well as obtain a broad infectious work-up (blood, urine, etc). Would also highly consider chest imaging should the rest be negative or he declare himself. Doubt it's related to prednisone given we have been de-escalating this already and WBC is increasing.    Recommendations:  - continue current IS and prophylaxis  - agree with broad infectious work-up as you are doing  - next RHC / biopsy 6/27     Discussed with Dr. Minaya.  Sukumar Mejía, CVD Fellow  ================================================================    Interval History   - no acute events  - WBC continues to trend upwards  - no fevers, chills, NS, pain, SOB, diarrhea  - says his stools have been \"softer\" the last day or so and will thus hold off on Senna today  - stools not watery however    Physical Exam   Temp: 97.7  F (36.5  C) Temp src: Oral BP: 124/75   Heart Rate: 100 Resp: 18 SpO2: 98 % O2 Device: None (Room air)    Vitals:    06/23/18 1236 06/24/18 0500 06/25/18 0552   Weight: 73.8 kg (162 lb 11.2 oz) 74.5 kg (164 lb 3.2 oz) 76.1 kg (167 lb 12.8 oz)       Heart Rate: 100, Blood pressure 124/75, pulse 108, temperature 97.7  F (36.5 " " C), temperature source Oral, resp. rate 18, height 1.727 m (5' 8\"), weight 76.1 kg (167 lb 12.8 oz), SpO2 98 %.  167 lbs 12.8 oz  GEN:  NAD, eating breakfast   CV: tachycardic, no murmur, no S3 or S4  LUNGS:  Lungs with decreased breath sounds  ABD:  Active bowel sounds, soft, non-tender/non-distended.  No rebound/guarding/rigidity.  EXT:  No edema or cyanosis.      Medications:  - reviewed in [facility]    Data:  - all labs and imaging reviewed            "

## 2018-06-25 NOTE — PROGRESS NOTES
Nephrology Progress Note  06/25/2018           Murray Nicholson is a 63 year old year old male with PMH of HTN, DMII, functionally bicuspid aortic valve, CHF/CM (EF 10-15%), ESRD, admitted with worsening dyspnea/SOB, now S/P heart tx 6/14 and started on CRRT, Nephrology managing HD/CRRT.      Interval History  Mr Nicholson is recovering well from heart tx 6/14, had planned on being discharged but WBC a bit elevated and had some borderline lactic acid levels.  Feeling well, planning for heart biopsy tomorrow, will plan for HD tomorrow as well.  Planning to have Mr Nicholson scheduled in renal transplant clinic, will resume outpt HD when discharged, appears will be tomorrow at the earliest.        Assessment & Recommendations:   ESRD: due to DM, on PD since Aug 2017, changed to HD 1/18, now TTS, at [facility] under care Dr Mcpherson, RIJ tunnel, EDW 75.5-76 kg prior to tx, 3.5 hrs.                          -Holding on HD today, will plan to run tomorrow, is also having heart bx tomorrow.    -Line is tunneled RI        Volume-EDW 76kg as outpt, 76.1kg today, has been as low as 73kg but had some orthostasis.  EDW likely ~74kg for now, will plan to UF with run tomorrow to 74kg.        Electrolytes/pH-K 5.0 (glucose 300 at the time, will come down with treatment), bicarb 19, no acute issues.        BMD- Ca 8.8, alb 3.1 last check, phos 5.2 last check, no acute issues.      Heart Tx-On mycophenolate and methylpred.      Anemia-Cont venofer 100 mg Qtues, cont Epo 4000 u with dialysis.      Nutrition-Taking PO.      Discussed with Dr Fried      Recommendations were communicated to team via verbal communication.            Moris Sevilla  Clinical Nurse Specialist  [phone]    Review of Systems:   I reviewed the following systems:  Gen: No fevers or chills  CV: No CP   Resp: No SOB  GI: No N/V    Physical Exam:   I/O last 3 completed shifts:  In: 540 [P.O.:540]  Out: -    /75  Pulse 108  Temp 97.7  F (36.5  C) (Oral)   "Resp 18  Ht 1.727 m (5' 8\")  Wt 76.1 kg (167 lb 12.8 oz)  SpO2 98%  BMI 25.51 kg/m2     GENERAL APPEARANCE: Lying in bed, in no distress  EYES:  No scleral icterus, pupils equal  HENT: mouth without ulcers or lesions  PULM: lungs clear to auscultation, equal air movement, no cyanosis or clubbing  CV: regular rhythm, normal rate, no rub     -JVP not elevated     -edema trace LE.  GI: soft, non-tender, not distended, bowel sounds are +  MS: no evidence of inflammation in joints, no muscle tenderness  NEURO: Alert and oriented  Lines-tunneled HD line    Labs:   All labs reviewed by me  Electrolytes/Renal -   Recent Labs   Lab Test  06/25/18   0509 06/24/18 0514 06/23/18 2126 06/19/18   0657  06/18/18   0827  06/17/18   1630   NA  129*  131*  130*   < >  128*  130*  130*   POTASSIUM  5.0  4.5  3.7   < >  5.1  5.0  4.6   CHLORIDE  93*  93*  94   < >  94  94  94   CO2  19*  24  22   < >  18*  22  23   BUN  82*  53*  42*   < >  68*  52*  34*   CR  6.50*  4.69*  4.01*   < >  6.50*  5.01*  3.48*   GLC  300*  298*  306*   < >  170*  113*  168*   TRINI  8.8  9.0  9.0   < >  9.0  9.0  9.6   MAG   --    --    --    --   2.2  2.2  2.2   PHOS   --    --    --    --   5.2*  4.0  4.3    < > = values in this interval not displayed.       CBC -   Recent Labs   Lab Test  06/25/18 0509 06/24/18   0514  06/23/18 2126   WBC  19.9*  16.7*  14.1*   HGB  10.0*  10.4*  10.8*   PLT  282  270  263       LFTs -   Recent Labs   Lab Test  06/23/18 2126 06/18/18   0827  06/17/18   0433   ALKPHOS  136  114  105   BILITOTAL  0.6  0.8  1.1   ALT  26  34  33   AST  13  56*  92*   PROTTOTAL  7.0  6.9  7.2   ALBUMIN  3.1*  3.3*  3.5       Iron Panel -   Recent Labs   Lab Test  05/08/18   0954  07/19/17   1306  07/05/17   1204   IRON  58  46  26*   IRONSAT  27  18  12*   ALBERTINA  621*  369  542*           Current Medications:    apixaban ANTICOAGULANT  2.5 mg Oral BID     aspirin  81 mg Oral Daily     hydrALAZINE  75 mg Oral Q6H     insulin " aspart   Subcutaneous TID w/meals     insulin aspart  1-10 Units Subcutaneous TID AC     insulin aspart  1-7 Units Subcutaneous At Bedtime     insulin glargine  8 Units Subcutaneous QAM AC     isosorbide dinitrate  10 mg Oral Q8H     mycophenolate  1,500 mg Oral BID IS     NEPHROCAPS  1 capsule Oral Daily     nystatin  1,000,000 Units Swish & Swallow 4x Daily     pantoprazole  40 mg Oral QAM     pravastatin  40 mg Oral Daily     predniSONE  20 mg Oral BID     senna-docusate  1 tablet Oral BID     sodium chloride (PF)  3 mL Intracatheter Q8H     sulfamethoxazole-trimethoprim  1 half-tab Oral QPM     tacrolimus  3 mg Oral QAM     tacrolimus  3.5 mg Oral QPM     terbutaline  5 mg Oral or Feeding Tube Q8H     [START ON 6/28/2018] valGANciclovir  450 mg Oral Once per day on Mon Thu       IV fluid REPLACEMENT ONLY       IV fluid REPLACEMENT ONLY       dextrose 5% and 0.45% NaCl 10 mL/hr at 06/15/18 0900     Reason beta blocker order not selected
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
## 2018-06-25 NOTE — PLAN OF CARE
Problem: Patient Care Overview  Goal: Plan of Care/Patient Progress Review  RN:  1.Pt will remain hemodynamically stable.  2.BG WNL.   Discharge Planner OT   Patient plan for discharge: Home  Current status: SBA/ind with sit <> stand without AD. Completed >2000 ft with SBA without AD, x 1 seated rest break and x 2 standing rest breaks. Educated pt on energy conservation/work simplification including use of 4WW for increased independence with IADLs and community mobility, pt verbalized understanding of education.  Barriers to return to prior living situation: medical stability, deconditioning  Recommendations for discharge: Home with OP CR Phase II  Rationale for recommendations: Pt would benefit from continued OP CR Phase II to address remaining deficits and maximize strength/independence to complete ADLs/IADLs at OF.         Entered by: Edith Bloom 06/25/2018 3:37 PM

## 2018-06-25 NOTE — PLAN OF CARE
Problem: Patient Care Overview  Goal: Plan of Care/Patient Progress Review  RN:  1.Pt will remain hemodynamically stable.  2.BG WNL.     SR/ST. VSS Triggered sepsis protocol, no lactic acid to be ordered tonight per CVTS. Order placed.  BG elevated, endo consult needed? SSI given as ordered. TPA to PICC, good blood return.  P- Continue to monitor and notify team of changes.

## 2018-06-26 ENCOUNTER — APPOINTMENT (OUTPATIENT)
Dept: CT IMAGING | Facility: CLINIC | Age: 63
DRG: 001 | End: 2018-06-26
Attending: PHYSICIAN ASSISTANT
Payer: COMMERCIAL

## 2018-06-26 ENCOUNTER — NURSE TRIAGE (OUTPATIENT)
Dept: NURSING | Facility: CLINIC | Age: 63
End: 2018-06-26

## 2018-06-26 LAB
ANION GAP SERPL CALCULATED.3IONS-SCNC: 18 MMOL/L (ref 3–14)
APTT PPP: 29 SEC (ref 22–37)
BUN SERPL-MCNC: 109 MG/DL (ref 7–30)
CALCIUM SERPL-MCNC: 9.2 MG/DL (ref 8.5–10.1)
CHLORIDE SERPL-SCNC: 92 MMOL/L (ref 94–109)
CO2 SERPL-SCNC: 18 MMOL/L (ref 20–32)
CREAT SERPL-MCNC: 8 MG/DL (ref 0.66–1.25)
ERYTHROCYTE [DISTWIDTH] IN BLOOD BY AUTOMATED COUNT: 17.4 % (ref 10–15)
GFR SERPL CREATININE-BSD FRML MDRD: 7 ML/MIN/1.7M2
GLUCOSE BLDC GLUCOMTR-MCNC: 195 MG/DL (ref 70–99)
GLUCOSE BLDC GLUCOMTR-MCNC: 211 MG/DL (ref 70–99)
GLUCOSE BLDC GLUCOMTR-MCNC: 228 MG/DL (ref 70–99)
GLUCOSE BLDC GLUCOMTR-MCNC: 269 MG/DL (ref 70–99)
GLUCOSE BLDC GLUCOMTR-MCNC: 278 MG/DL (ref 70–99)
GLUCOSE BLDC GLUCOMTR-MCNC: 288 MG/DL (ref 70–99)
GLUCOSE SERPL-MCNC: 268 MG/DL (ref 70–99)
HCT VFR BLD AUTO: 30 % (ref 40–53)
HGB BLD-MCNC: 10.3 G/DL (ref 13.3–17.7)
INR PPP: 1.07 (ref 0.86–1.14)
LACTATE BLD-SCNC: 1.1 MMOL/L (ref 0.4–1.9)
MCH RBC QN AUTO: 29.5 PG (ref 26.5–33)
MCHC RBC AUTO-ENTMCNC: 34.3 G/DL (ref 31.5–36.5)
MCV RBC AUTO: 86 FL (ref 78–100)
PLATELET # BLD AUTO: 301 10E9/L (ref 150–450)
POTASSIUM SERPL-SCNC: 5.7 MMOL/L (ref 3.4–5.3)
RBC # BLD AUTO: 3.49 10E12/L (ref 4.4–5.9)
SODIUM SERPL-SCNC: 129 MMOL/L (ref 133–144)
TACROLIMUS BLD-MCNC: 11.3 UG/L (ref 5–15)
TME LAST DOSE: NORMAL H
WBC # BLD AUTO: 21.7 10E9/L (ref 4–11)

## 2018-06-26 PROCEDURE — A9270 NON-COVERED ITEM OR SERVICE: HCPCS | Mod: GY | Performed by: STUDENT IN AN ORGANIZED HEALTH CARE EDUCATION/TRAINING PROGRAM

## 2018-06-26 PROCEDURE — 25000125 ZZHC RX 250: Performed by: PHYSICIAN ASSISTANT

## 2018-06-26 PROCEDURE — A9270 NON-COVERED ITEM OR SERVICE: HCPCS | Mod: GY | Performed by: PHYSICIAN ASSISTANT

## 2018-06-26 PROCEDURE — 21400006 ZZH R&B CCU INTERMEDIATE UMMC

## 2018-06-26 PROCEDURE — 85610 PROTHROMBIN TIME: CPT | Performed by: THORACIC SURGERY (CARDIOTHORACIC VASCULAR SURGERY)

## 2018-06-26 PROCEDURE — 25000128 H RX IP 250 OP 636: Performed by: RADIOLOGY

## 2018-06-26 PROCEDURE — 25000132 ZZH RX MED GY IP 250 OP 250 PS 637: Mod: GY | Performed by: PHYSICIAN ASSISTANT

## 2018-06-26 PROCEDURE — A9270 NON-COVERED ITEM OR SERVICE: HCPCS | Mod: GY | Performed by: THORACIC SURGERY (CARDIOTHORACIC VASCULAR SURGERY)

## 2018-06-26 PROCEDURE — 25000132 ZZH RX MED GY IP 250 OP 250 PS 637: Mod: GY | Performed by: THORACIC SURGERY (CARDIOTHORACIC VASCULAR SURGERY)

## 2018-06-26 PROCEDURE — 25000125 ZZHC RX 250: Performed by: RADIOLOGY

## 2018-06-26 PROCEDURE — 80048 BASIC METABOLIC PNL TOTAL CA: CPT | Performed by: THORACIC SURGERY (CARDIOTHORACIC VASCULAR SURGERY)

## 2018-06-26 PROCEDURE — 25000131 ZZH RX MED GY IP 250 OP 636 PS 637: Mod: GY | Performed by: STUDENT IN AN ORGANIZED HEALTH CARE EDUCATION/TRAINING PROGRAM

## 2018-06-26 PROCEDURE — 25000132 ZZH RX MED GY IP 250 OP 250 PS 637: Mod: GY | Performed by: STUDENT IN AN ORGANIZED HEALTH CARE EDUCATION/TRAINING PROGRAM

## 2018-06-26 PROCEDURE — 25000128 H RX IP 250 OP 636: Performed by: PHYSICIAN ASSISTANT

## 2018-06-26 PROCEDURE — 25000132 ZZH RX MED GY IP 250 OP 250 PS 637: Performed by: THORACIC SURGERY (CARDIOTHORACIC VASCULAR SURGERY)

## 2018-06-26 PROCEDURE — 71260 CT THORAX DX C+: CPT

## 2018-06-26 PROCEDURE — A9270 NON-COVERED ITEM OR SERVICE: HCPCS | Mod: GY | Performed by: INTERNAL MEDICINE

## 2018-06-26 PROCEDURE — 87040 BLOOD CULTURE FOR BACTERIA: CPT | Performed by: PHYSICIAN ASSISTANT

## 2018-06-26 PROCEDURE — 90937 HEMODIALYSIS REPEATED EVAL: CPT

## 2018-06-26 PROCEDURE — 36592 COLLECT BLOOD FROM PICC: CPT | Performed by: STUDENT IN AN ORGANIZED HEALTH CARE EDUCATION/TRAINING PROGRAM

## 2018-06-26 PROCEDURE — 85730 THROMBOPLASTIN TIME PARTIAL: CPT | Performed by: THORACIC SURGERY (CARDIOTHORACIC VASCULAR SURGERY)

## 2018-06-26 PROCEDURE — 25000132 ZZH RX MED GY IP 250 OP 250 PS 637: Performed by: INTERNAL MEDICINE

## 2018-06-26 PROCEDURE — 99024 POSTOP FOLLOW-UP VISIT: CPT | Performed by: INTERNAL MEDICINE

## 2018-06-26 PROCEDURE — 25000132 ZZH RX MED GY IP 250 OP 250 PS 637: Mod: GY | Performed by: INTERNAL MEDICINE

## 2018-06-26 PROCEDURE — 25000128 H RX IP 250 OP 636: Performed by: CLINICAL NURSE SPECIALIST

## 2018-06-26 PROCEDURE — 36592 COLLECT BLOOD FROM PICC: CPT | Performed by: PHYSICIAN ASSISTANT

## 2018-06-26 PROCEDURE — 80197 ASSAY OF TACROLIMUS: CPT | Performed by: INTERNAL MEDICINE

## 2018-06-26 PROCEDURE — 36415 COLL VENOUS BLD VENIPUNCTURE: CPT | Performed by: THORACIC SURGERY (CARDIOTHORACIC VASCULAR SURGERY)

## 2018-06-26 PROCEDURE — 25000125 ZZHC RX 250: Performed by: INTERNAL MEDICINE

## 2018-06-26 PROCEDURE — 25000128 H RX IP 250 OP 636

## 2018-06-26 PROCEDURE — 85027 COMPLETE CBC AUTOMATED: CPT | Performed by: THORACIC SURGERY (CARDIOTHORACIC VASCULAR SURGERY)

## 2018-06-26 PROCEDURE — 00000146 ZZHCL STATISTIC GLUCOSE BY METER IP

## 2018-06-26 PROCEDURE — 83605 ASSAY OF LACTIC ACID: CPT | Performed by: STUDENT IN AN ORGANIZED HEALTH CARE EDUCATION/TRAINING PROGRAM

## 2018-06-26 PROCEDURE — 63400005 ZZH RX 634: Performed by: CLINICAL NURSE SPECIALIST

## 2018-06-26 PROCEDURE — 25000131 ZZH RX MED GY IP 250 OP 636 PS 637: Mod: GY | Performed by: INTERNAL MEDICINE

## 2018-06-26 RX ORDER — TACROLIMUS 1 MG/1
3 CAPSULE ORAL EVERY EVENING
Status: DISCONTINUED | OUTPATIENT
Start: 2018-06-26 | End: 2018-06-28

## 2018-06-26 RX ORDER — HEPARIN SODIUM 1000 [USP'U]/ML
1000 INJECTION, SOLUTION INTRAVENOUS; SUBCUTANEOUS
Status: COMPLETED | OUTPATIENT
Start: 2018-06-26 | End: 2018-06-26

## 2018-06-26 RX ORDER — HEPARIN SODIUM 1000 [USP'U]/ML
1000 INJECTION, SOLUTION INTRAVENOUS; SUBCUTANEOUS CONTINUOUS
Status: DISCONTINUED | OUTPATIENT
Start: 2018-06-26 | End: 2018-06-26

## 2018-06-26 RX ORDER — ISOSORBIDE DINITRATE 10 MG/1
10 TABLET ORAL EVERY 8 HOURS
Status: DISCONTINUED | OUTPATIENT
Start: 2018-06-26 | End: 2018-06-28

## 2018-06-26 RX ORDER — IOPAMIDOL 755 MG/ML
104 INJECTION, SOLUTION INTRAVASCULAR ONCE
Status: COMPLETED | OUTPATIENT
Start: 2018-06-26 | End: 2018-06-26

## 2018-06-26 RX ORDER — HYDRALAZINE HYDROCHLORIDE 20 MG/ML
10-20 INJECTION INTRAMUSCULAR; INTRAVENOUS EVERY 4 HOURS PRN
Status: DISCONTINUED | OUTPATIENT
Start: 2018-06-26 | End: 2018-07-02 | Stop reason: HOSPADM

## 2018-06-26 RX ORDER — ISOSORBIDE DINITRATE 20 MG/1
20 TABLET ORAL EVERY 8 HOURS
Status: DISCONTINUED | OUTPATIENT
Start: 2018-06-26 | End: 2018-06-26

## 2018-06-26 RX ORDER — CEFEPIME HYDROCHLORIDE 1 G/1
1 INJECTION, POWDER, FOR SOLUTION INTRAMUSCULAR; INTRAVENOUS EVERY 24 HOURS
Status: DISCONTINUED | OUTPATIENT
Start: 2018-06-26 | End: 2018-06-30

## 2018-06-26 RX ADMIN — PRAVASTATIN SODIUM 40 MG: 40 TABLET ORAL at 20:27

## 2018-06-26 RX ADMIN — Medication 1 CAPSULE: at 08:19

## 2018-06-26 RX ADMIN — ASPIRIN 81 MG: 81 TABLET, COATED ORAL at 08:18

## 2018-06-26 RX ADMIN — NYSTATIN 1000000 UNITS: 100000 SUSPENSION ORAL at 20:26

## 2018-06-26 RX ADMIN — EPOETIN ALFA 4000 UNITS: 10000 SOLUTION INTRAVENOUS; SUBCUTANEOUS at 10:52

## 2018-06-26 RX ADMIN — IOPAMIDOL 104 ML: 755 INJECTION, SOLUTION INTRAVENOUS at 10:15

## 2018-06-26 RX ADMIN — ISOSORBIDE DINITRATE 10 MG: 10 TABLET ORAL at 21:36

## 2018-06-26 RX ADMIN — HEPARIN SODIUM 1000 UNITS/HR: 1000 INJECTION, SOLUTION INTRAVENOUS; SUBCUTANEOUS at 10:31

## 2018-06-26 RX ADMIN — HYDRALAZINE HYDROCHLORIDE 100 MG: 100 TABLET ORAL at 05:52

## 2018-06-26 RX ADMIN — ISOSORBIDE DINITRATE 10 MG: 10 TABLET ORAL at 05:52

## 2018-06-26 RX ADMIN — MYCOPHENOLATE MOFETIL 1500 MG: 250 CAPSULE ORAL at 18:47

## 2018-06-26 RX ADMIN — APIXABAN 2.5 MG: 2.5 TABLET, FILM COATED ORAL at 08:18

## 2018-06-26 RX ADMIN — IRON SUCROSE 100 MG: 20 INJECTION, SOLUTION INTRAVENOUS at 10:53

## 2018-06-26 RX ADMIN — METRONIDAZOLE 500 MG: 500 INJECTION, SOLUTION INTRAVENOUS at 23:43

## 2018-06-26 RX ADMIN — TERBUTALINE SULFATE 5 MG: 5 TABLET ORAL at 05:52

## 2018-06-26 RX ADMIN — PREDNISONE 20 MG: 20 TABLET ORAL at 08:18

## 2018-06-26 RX ADMIN — HYDRALAZINE HYDROCHLORIDE 100 MG: 100 TABLET ORAL at 23:43

## 2018-06-26 RX ADMIN — VANCOMYCIN HYDROCHLORIDE 1500 MG: 10 INJECTION, POWDER, LYOPHILIZED, FOR SOLUTION INTRAVENOUS at 14:58

## 2018-06-26 RX ADMIN — PREDNISONE 20 MG: 20 TABLET ORAL at 20:27

## 2018-06-26 RX ADMIN — SODIUM CHLORIDE 250 ML: 9 INJECTION, SOLUTION INTRAVENOUS at 10:31

## 2018-06-26 RX ADMIN — TACROLIMUS 3 MG: 1 CAPSULE ORAL at 08:17

## 2018-06-26 RX ADMIN — PANTOPRAZOLE SODIUM 40 MG: 40 TABLET, DELAYED RELEASE ORAL at 08:17

## 2018-06-26 RX ADMIN — TERBUTALINE SULFATE 5 MG: 5 TABLET ORAL at 15:00

## 2018-06-26 RX ADMIN — HYDRALAZINE HYDROCHLORIDE 100 MG: 100 TABLET ORAL at 18:47

## 2018-06-26 RX ADMIN — NYSTATIN 1000000 UNITS: 100000 SUSPENSION ORAL at 08:18

## 2018-06-26 RX ADMIN — TACROLIMUS 3 MG: 1 CAPSULE ORAL at 18:47

## 2018-06-26 RX ADMIN — MYCOPHENOLATE MOFETIL 1500 MG: 250 CAPSULE ORAL at 08:17

## 2018-06-26 RX ADMIN — NYSTATIN 1000000 UNITS: 100000 SUSPENSION ORAL at 16:37

## 2018-06-26 RX ADMIN — SODIUM CHLORIDE 300 ML: 9 INJECTION, SOLUTION INTRAVENOUS at 10:31

## 2018-06-26 RX ADMIN — METRONIDAZOLE 500 MG: 500 INJECTION, SOLUTION INTRAVENOUS at 16:43

## 2018-06-26 RX ADMIN — Medication 1 HALF-TAB: at 20:27

## 2018-06-26 RX ADMIN — SODIUM CHLORIDE, PRESERVATIVE FREE 76 ML: 5 INJECTION INTRAVENOUS at 10:18

## 2018-06-26 RX ADMIN — CEFEPIME HYDROCHLORIDE 1 G: 1 INJECTION, POWDER, FOR SOLUTION INTRAMUSCULAR; INTRAVENOUS at 09:42

## 2018-06-26 RX ADMIN — HEPARIN SODIUM 1000 UNITS: 1000 INJECTION, SOLUTION INTRAVENOUS; SUBCUTANEOUS at 10:31

## 2018-06-26 RX ADMIN — TERBUTALINE SULFATE 5 MG: 5 TABLET ORAL at 21:35

## 2018-06-26 NOTE — CONSULTS
[facility]    Transplant Infectious Diseases Inpatient Consultation      Murray Nicholson MRN# [id number]   YOB: 1955 Age: 63 year old   Date of Admission and time: 4/16/2018  8:12 PM     Reason for consult: I was asked by CVTS team to evaluate this patient for leukocytosis.           Recommendations:   1. Agree with vancomycin and cefepime.   2. Would add flagyl  mg q8 hr.   3. Surgical evaluation in this immunocompromised patient with pneumoperitoneum.   4. Given diarrhea I would hold laxatives.         Summary of Presentation:   Transplants:  6/14/2018 (Heart), Postoperative day:  12     This patient is a 63 year old male with DM, ESRD, CHF s/p OHT with solumedrol induction therapy. Now on TAC/MMF/tapering prednisone for maintenance.     ID consulted for leukocytosis.         Active Problems and Infectious Diseases Issues:   1. Leukocytosis.   With diarrhea, however C diff is negative.   The patient has unexplained pneumoperitoneum.   The leukocytosis might be reactive to this unexplained pneumoperitoneum however, till microperforation is ruled out I would continue cefepime and vancomycin, I would also add flagyl IV for intra-abdominal process.   The patient is also hypotensive which is concerning.   It might be reasonable to obtain surgical evaluation.         Old Problems and Infectious Diseases Issues:   1. Peritonitis related to PD catheter infection with Strep sp, E coli, P aeruginosa in 12/2017. And C glabrata and Bacteroides cacca in 1/2018 with negative CT abdomen and removal of PD catheter and conversion to HD.     Other Infectious Disease issues include:  - PCP prophylaxis: bactrim.   - Serostatus: CMV D+/R-, EBV D?/R+, HSV1-/2+, VZV +  - Immunization status: Too soon to discuss.   - Gamma globulin status: not recently checked.     Thank you very much for involving me in the care of Mr. Murray Nicholson. Please do not hesitate to call me for any question.      Discussed with primary service.   Attestation:  I interviewed the patient and obtained history from the patient, and by reviewing the patient's chart including microbiological data, and radiological data. All data are summarized in this notes.  Mack JOHNSON Maurice   Pager: [phone]  06/26/2018             History of Present Illness:   Transplants:  6/14/2018 (Heart), Postoperative day:  12     This patient is a 63 year old male with DM, ESRD, CHF s/p OHT.   He started to develop leukocytosis with no localizing symptoms except diarrhea today with crampy abdominal pain that resolves after BM.   No N/V but has poor appetite.   It seems that the patient today is hypotensive during HD.     The patient used to be on PD. In 12/2017 he was diagnosed with peritonitis due to E coli, P aeruginosa, and Strep sp. He was treated with intraperitoneal ceftazidime then needed and admission in 1/2018 for IV ABx. During that admission, peritoneal fluid grew C glabrata and Bacteroides cacca. CT abdomen and pelvis was unremarkable. PD catheter was removed.   The patient finished 2 weeks of IV ABx and 2 weeks of micafungin. (the C glabrata was susceptible to fluconazole with LILIAN of 4).                 Past Medical History:     Past Medical History:   Diagnosis Date     (HFpEF) heart failure with preserved ejection fraction (H)      Allergic rhinitis, cause unspecified      Anemia of chronic kidney failure      AS (aortic stenosis)      Ascending aortic aneurysm (H)      Bicuspid aortic valve      CAD (coronary artery disease)      Chronic kidney disease, stage 5 (H)      Congestive heart failure, unspecified      Dyslipidemia      Esophageal reflux      ESRD (end stage renal disease) (H)      Hypersomnia with sleep apnea, unspecified      Hypertension      MGUS (monoclonal gammopathy of unknown significance)      Mitral regurgitation      SHEELA (obstructive sleep apnea)      Systolic heart failure (H)      Type 2 diabetes mellitus (H)               Past Surgical History:     Past Surgical History:   Procedure Laterality Date     COLONOSCOPY N/A 5/3/2018    Procedure: COLONOSCOPY;  colonoscopy ;  Surgeon: Ammon Castillo MD;  Location: [facility]     ESOPHAGOSCOPY, GASTROSCOPY, DUODENOSCOPY (EGD), COMBINED N/A 2018    Procedure: COMBINED ENDOSCOPIC ULTRASOUND, ESOPHAGOSCOPY, GASTROSCOPY, DUODENOSCOPY (EGD), FINE NEEDLE ASPIRATE/BIOPSY;  Endoscopic Ultrasound with Fine Needle Aspiration ;  Surgeon: Alon Don MD;  Location: [facility]     LAPAROSCOPIC HERNIORRHAPHY INGUINAL BILATERAL Bilateral 2015    Procedure: LAPAROSCOPIC HERNIORRHAPHY INGUINAL BILATERAL;  Surgeon: Bobby Mcconnell MD;  Location: [facility]     LAPAROSCOPIC INSERTION CATHETER PERITONEAL DIALYSIS N/A 2017    Procedure: LAPAROSCOPIC INSERTION CATHETER PERITONEAL DIALYSIS;  Laparoscopic Peritoneal Dialysis Catheter Placement - Anesthesia with block;  Surgeon: Esteban Arvizu MD;  Location: [facility]     PICC INSERTION Left 2018    5Fr - 49cm (3cm external), Basilic vein, low SVC     REMOVE CATHETER PERITONEAL Right 1/15/2018    Procedure: REMOVE CATHETER PERITONEAL;  Open Removal of Peritoneal Dialysis Catheter ;  Surgeon: Esteban Arvizu MD;  Location: [facility]     TRANSPLANT HEART RECIPIENT N/A 2018    Procedure: TRANSPLANT HEART RECIPIENT;  Median Sternotomy, on-pump oxygenator, Heart Transplant;  Surgeon: Rony Caputo MD;  Location: [facility]               Social History:     Social History   Substance Use Topics     Smoking status: Former Smoker     Packs/day: 1.00     Years: 19.00     Types: Cigarettes     Quit date: 1994     Smokeless tobacco: Never Used     Alcohol use No             Family History:   I have reviewed this patient's family history  Family History   Problem Relation Age of Onset     C.A.D. Father       from-never knew father-age 60     Diabetes Father      Cerebrovascular Disease Father      Hypertension No family hx of       Breast Cancer No family hx of      Cancer - colorectal No family hx of      Prostate Cancer No family hx of      KIDNEY DISEASE No family hx of              Immunizations:     Immunization History   Administered Date(s) Administered     HEPA 02/12/2008, 02/09/2010     HepB 10/07/2015, 11/17/2015, 04/05/2016     Influenza (H1N1) 02/09/2010     Influenza (IIV3) PF 11/04/2003, 11/29/2006, 12/02/2008, 09/23/2009, 12/29/2010, 10/22/2011, 11/26/2012     Influenza Vaccine IM 3yrs+ 4 Valent IIV4 12/30/2013, 12/08/2014, 09/23/2015, 11/08/2016, 11/07/2017     Mantoux Tuberculin Skin Test 01/23/2018     Pneumo Conj 13-V (2010&after) 09/23/2015     Pneumococcal 23 valent 08/18/2005, 02/23/2011     TD (ADULT, 7+) 08/12/2004     TDAP Vaccine (Adacel) 02/28/2013     Zoster vaccine, live 04/09/2015             Allergies:     Allergies   Allergen Reactions     Norco [Hydrocodone-Acetaminophen] Nausea and Vomiting     Cats      Throat tightness     Isosorbide Other (See Comments)     hypotension     Penicillins Hives     Seasonal Allergies      rhinitis     Shrimp      Throat closes              Medications:   Medications that Require Transfusion:     IV fluid REPLACEMENT ONLY       IV fluid REPLACEMENT ONLY       dextrose 5% and 0.45% NaCl 10 mL/hr at 06/15/18 0900     heparin (porcine) 1,000 Units/hr (06/26/18 1031)     Reason beta blocker order not selected     Scheduled Medications:     apixaban ANTICOAGULANT  2.5 mg Oral BID     aspirin  81 mg Oral Daily     ceFEPIme (MAXIPIME) IV  1 g Intravenous Q24H     hydrALAZINE  100 mg Oral Q6H     insulin aspart   Subcutaneous TID w/meals     insulin aspart  1-10 Units Subcutaneous TID AC     insulin aspart  1-7 Units Subcutaneous At Bedtime     insulin glargine  15 Units Subcutaneous Q24H     isosorbide dinitrate  10 mg Oral Q8H     mycophenolate  1,500 mg Oral BID IS     NEPHROCAPS  1 capsule Oral Daily     nystatin  1,000,000 Units Swish & Swallow 4x Daily     pantoprazole  40 mg  Oral QAM     pravastatin  40 mg Oral Daily     predniSONE  20 mg Oral BID     senna-docusate  1 tablet Oral BID     sodium chloride (PF)  3 mL Intracatheter During Hemodialysis (from stock)     sodium chloride (PF)  3 mL Intracatheter During Hemodialysis (from stock)     sodium chloride (PF)  3 mL Intracatheter Q8H     sulfamethoxazole-trimethoprim  1 half-tab Oral QPM     tacrolimus  3 mg Oral QPM     tacrolimus  3 mg Oral QAM     terbutaline  5 mg Oral or Feeding Tube Q8H     [START ON 6/28/2018] valGANciclovir  450 mg Oral Once per day on Mon Thu     vancomycin (VANCOCIN) IV  1,500 mg Intravenous Once     vancomycin place bui - receiving intermittent dosing  1 each Does not apply See Admin Instructions             Review of Systems:    As mentioned in the interim history otherwise negative by reviewing constitutional symptoms, central and peripheral neurological systems, respiratory system, cardiac system, GI system,  system, musculoskeletal, skin, allergy, and lymphatics.                  Physical Exam:   Temp: 97.4  F (36.3  C) Temp src: Oral BP: 105/52 Pulse: 105 Heart Rate: 102 Resp: 18 SpO2: 98 % O2 Device: None (Room air)      Wt Readings from Last 4 Encounters:   06/26/18 76.9 kg (169 lb 8 oz)   06/21/18 76.3 kg (168 lb 4.8 oz)   04/16/18 76.9 kg (169 lb 9.6 oz)   04/10/18 76.7 kg (169 lb)   Constitutional: awake, alert, cooperative, no apparent distress and appears at stated age, well nourished.   Head, ENT, Eyes, and Neck: Normocephalic, sinuses non-tender to palpation, external ears without lesions, moist buccal mucosa without oral thrush, tonsils without swelling, erythema, or exudate, no tenderness palpating teeth, good dentition, gums without necrosis or abscesses.   PERRL, EOMI, pink conjunctivae, non-icteric sclera.   Neck supple without rigidity, no cervical/axillary/inguinal LA bilaterally.    Neurologic: Patient is moving all extremities without focal deficit, no focal sensory loss.    Lungs: CTA bilaterally, no accessory muscle use, no dullness to percussion and no abnormal tactile fremitus.   CVS: RRR, normal S1/S2, no murmur, PMI was not displaced.   Abdomen: non-tender, non-distended, no masses, no bruit, no shifting dullness, normal BS.   Extremities: no pitting edema of bilateral lower extremities, no ulcers, normal ROM of all joints, no swelling or erythema of any of joints and no tenderness to palpation.   Skin: no induration, fluctuation or discharge at the HD catheter in the right chest wall and the sternal wound, and no rash            Data:   No results found for: ACD4    Inflammatory Markers    Recent Labs   Lab Test  07/05/17   1204  05/31/17   1111  05/17/17   1214  04/13/17   0651  04/12/17   0935  04/11/17   1319  01/13/16   1337   SED   --    --    --    --    --    --   80*   CRP  35.4*  15.9*  39.3*  270.0*  200.0*  130.0*   --        Immune Globulin Studies     Recent Labs   Lab Test  05/19/15   1000   IGG  1380   IGM  108   IGA  203       Metabolic Studies       Recent Labs   Lab Test  06/26/18   0433  06/25/18   0509  06/24/18   0514  06/24/18   0150  06/23/18   2324  06/23/18   2126  06/23/18   1919  06/23/18   0533  06/22/18   0608   06/19/18   0657  06/18/18   0827   04/26/18   0645   04/12/17   0935   NA  129*  129*  131*   --    --   130*  129*  130*  132*   < >  128*  130*   < >   --    < >   --    POTASSIUM  5.7*  5.0  4.5   --    --   3.7  4.0  4.8  4.4   < >  5.1  5.0   < >   --    < >   --    CHLORIDE  92*  93*  93*   --    --   94  93*  94  94   < >  94  94   < >   --    < >   --    CO2  18*  19*  24   --    --   22   --   20  22   < >  18*  22   < >   --    < >   --    ANIONGAP  18*  18*  13   --    --   14   --   16*  16*   < >  16*  14   < >   --    < >   --    BUN  109*  82*  53*   --    --   42*   --   78*  50*   < >  68*  52*   < >   --    < >   --    CR  8.00*  6.50*  4.69*   --    --   4.01*   --   6.45*  4.71*   < >  6.50*  5.01*   < >   --    < >   --     GFRESTIMATED  7*  9*  13*   --    --   15*   --   9*  13*   < >  9*  12*   < >   --    < >   --    GLC  268*  300*  298*   --    --   306*  352*  305*  440*   < >  170*  113*   < >   --    < >   --    A1C   --    --    --    --    --    --    --    --    --    --    --    --    --   7.0*   < >   --    TRINI  9.2  8.8  9.0   --    --   9.0   --   9.3  9.2   < >  9.0  9.0   < >   --    < >   --    PHOS   --    --    --    --    --    --    --    --    --    --   5.2*  4.0   < >   --    < >   --    MAG   --    --    --    --    --    --    --    --    --    --   2.2  2.2   < >   --    < >   --    LACT   --    --    --   1.4  2.2*   --   2.0   --    --    --    --    --    < >   --    < >   --    CKT   --    --    --    --    --    --    --    --    --    --    --    --    --    --    --   70    < > = values in this interval not displayed.       Hepatic Studies    Recent Labs   Lab Test  06/25/18   0509  06/23/18 2126  06/18/18   0827  06/17/18   0433  06/16/18   0342  06/15/18   2200  06/15/18   1541   05/19/15   1000   BILITOTAL   --   0.6  0.8  1.1  2.0*  2.7*  2.5*   < >  0.4   ALKPHOS   --   136  114  105  85  83  83   < >  126   ALBUMIN   --   3.1*  3.3*  3.5  3.0*  3.0*  3.2*   < >  2.9*   AST   --   13  56*  92*  111*  107*  105*   < >  15   ALT   --   26  34  33  30  28  26   < >  20   LDH  271*   --    --    --    --    --    --    --   252*    < > = values in this interval not displayed.       Pancreatitis testing    Recent Labs   Lab Test  06/14/18   1207  04/16/18   1546  04/11/17   0722  11/29/16   1120  04/14/16   0958  08/10/15   0729  04/09/15   0900   AMYLASE  62   --    --    --    --    --    --    TRIG   --   233*  145  147  240*  421*  426*       Hematology Studies      Recent Labs   Lab Test  06/26/18   0433  06/25/18   0509  06/24/18   0514  06/23/18   2126  06/23/18   0533  06/22/18   0608   06/18/18   0827  06/17/18   0433   06/16/18   0342  06/15/18   2200  06/15/18   1541   WBC  21.7*   19.9*  16.7*  14.1*  14.3*  14.3*   < >  14.7*  14.7*   < >  12.3*  11.2*  10.8   ANEU   --    --    --   12.5*   --    --    --   13.4*  14.0*   --   11.6*  10.5*  10.0*   ALYM   --    --    --   1.0   --    --    --   1.1  0.6*   --   0.5*  0.5*  0.3*   DK   --    --    --   0.5   --    --    --   0.2  0.2   --   0.2  0.2  0.5   AEOS   --    --    --   0.0   --    --    --   0.0  0.0   --   0.0  0.0  0.0   HGB  10.3*  10.0*  10.4*  10.8*  10.7*  10.6*   < >  9.5*  9.2*   < >  8.3*  8.2*  8.7*   HCT  30.0*  30.0*  32.2*  33.2*  31.8*  33.4*   < >  29.7*  28.7*   < >  25.8*  25.5*  26.7*   PLT  301  282  270  263  273  262   < >  143*  139*   < >  145*  145*  135*    < > = values in this interval not displayed.       Clotting Studies    Recent Labs   Lab Test  06/26/18   0433  06/25/18   0509  06/24/18   0514  06/23/18   0533   INR  1.07  1.07  1.07  1.07   PTT  29  29  29  28       Arterial Blood Gas Testing    Recent Labs   Lab Test  06/16/18   0836  06/16/18   0828  06/16/18   0655  06/16/18   0334  06/15/18   2200  06/15/18   1956   PH   --   7.43  7.43  7.46*  7.47*  7.45   PCO2   --   38  38  35  34*  36   PO2   --   120*  171*  180*  169*  172*   HCO3   --   25  25  25  25  25   O2PER  4L  4L  40  40  40  40  40        Urine Studies     Recent Labs   Lab Test  06/25/18   1420  02/05/18   0935  02/04/18   2127  05/03/17   1344  04/08/17   1720   URINEPH  5.0  5.5  Canceled: Specimen improperly labeled. Laboratory value report is not on this patient.  5.0  5.0   NITRITE  Negative  Negative  Canceled: Specimen improperly labeled. Laboratory value report is not on this patient.*  Negative  Negative   LEUKEST  Small*  Negative  Canceled: Specimen improperly labeled. Laboratory value report is not on this patient.*  Negative  Negative   WBCU  9*  4*  Canceled: Specimen improperly labeled. Laboratory value report is not on this patient.*  1  7*       Vancomycin Levels     Recent Labs   Lab Test  01/15/18   0815   01/13/18   1440  01/12/18   1402  01/12/18   0742  01/10/18   1015   VANCOMYCIN  22.5  28.8*  37.3*  32.2*  21.7       Tobramycin levels     No lab results found.    Gentamicin levels    Recent Labs   Lab Test  01/17/18   0900  01/15/18   0815  01/14/18   0749  01/13/18   1959   GENT  8.2  2.6  3.9  5.2       Tacrolimus levels    Invalid input(s): TACROLIMUS, TAC, TACR  Transplant Immunosuppression Labs Latest Ref Rng & Units 6/26/2018 6/25/2018 6/24/2018 6/23/2018 6/23/2018   Tacro Level 5.0 - 15.0 ug/L 11.3 8.6 8.9 - 10.4   Tacro Level - Not Provided Not Provided Not Provided - Not Provided   Creat 0.66 - 1.25 mg/dL 8.00(H) 6.50(H) 4.69(H) 4.01(H) -   BUN 7 - 30 mg/dL 109(H) 82(H) 53(H) 42(H) -   WBC 4.0 - 11.0 10e9/L 21.7(H) 19.9(H) 16.7(H) 14.1(H) -   Neutrophil % - - - 88.6 -   ANEU 1.6 - 8.3 10e9/L - - - 12.5(H) -       Microbiology:  Blood cx  6/26/2018 negative to date    6/24/2018 negative to date    Last check of C difficile  C Diff Toxin B PCR   Date Value Ref Range Status   06/25/2018 Negative NEG^Negative Final     Comment:     Negative: Clostridium difficile target DNA sequences NOT detected, presumed   negative for Clostridium difficile toxin B or the number of bacteria present   may be below the limit of detection for the test.  FDA approved assay performed using [employer] GeneXpert real-time PCR.  A negative result does not exclude actual disease due to Clostridium difficile   and may be due to improper collection, handling and storage of the specimen   or the number of organisms in the specimen is below the detection limit of the   assay.         Virology:  CMV viral loads  No lab results found.    CMV viral loads  No results found for: CMVLOG, 31006, 73779, 69649, 23465    CMV resistance testing  No lab results found.  No results found for: CMVCID, CMVFOS, CMVGAN       No results found for: H6RES    No results found for: EBVDN, EBRES, EBVDN, EBVSP, EBVPC, EBVPCR    CMV Antibody IgG   Date Value Ref  Range Status   06/14/2018 0.2 0.0 - 0.8 AI Final     Comment:     Negative  Antibody index (AI) values reflect qualitative changes in antibody   concentration that cannot be directly associated with clinical condition or   disease state.     04/16/2018 <0.2 0.0 - 0.8 AI Final     Comment:     Negative  Antibody index (AI) values reflect qualitative changes in antibody   concentration that cannot be directly associated with clinical condition or   disease state.     08/10/2015 0.2 0.0 - 0.8 AI Final     Comment:     Negative   Antibody index (AI) values reflect qualitative changes in antibody   concentration that cannot be directly associated with clinical condition or   disease state.         Toxoplasma Antibody IgG   Date Value Ref Range Status   04/16/2018 <3.0 0.0 - 7.1 IU/mL Final     Comment:     Negative- Absence of detectable Toxoplasma gondii IgG antibodies. A negative   result does not rule out acute infection.  The magnitude of the measured result is not indicative of the amount of   antibody present. The concentrations of anti-Toxoplasma gondii IgG in a given   specimen determined with assays from different manufacturers can vary due to   differences in assay methods and reagent specificity.         Imaging:  CXR 6/25/2018 reviewed by myself.   Impression: Pneumoperitoneum.  Left lower lobe airspace opacity which  silhouettes the posterior left hemidiaphragm, and is suspicious for  infection. In retrospect this is not significantly changed since  6/18/2018, however it is new from the preceding lateral radiograph on  4/16/2018.        Mack Richards  Pager: [phone]  06/26/2018
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
## 2018-06-26 NOTE — PROGRESS NOTES
Diabetes Consult Daily  Progress Note          Assessment/Plan:    Murray Nicholson is a 63 year old male with history of type 2 diabetes, hypertension, hyperlipidemia, ESRD ( HD, TTS), ICM ( EF 10 to 15%) due to valvular hear disease who was admitted for decompensated heart failure (4/16/2018) now s/p orthotopic  heart transplant (6/14/2018).        Type 2 diabetes: glipizide 5 mg PTA  Prednisone 20 mg bid     Plan  - increase glargine from 8 unit to 15 units every 24 hours ( 0800)  - increase Prandial insulin, from  Novolog 1 unit per 15 grams to 1 unit per 10 grams  of CHO for meals and snacks  change to Aspart high intensity sliding scale before meals and HS  -monitor glucose before meals HS and 0200  -Recommend starting IV insulin if glucose > 350 x 2     Will continue to follow                        Outpatient diabetes follow up: TBD  Plan discussed with patient           Interval History:   The last 24 hours progress and nursing notes reviewed.  Patient seen in dialysis    Blood sugars continue to be above target high 200 to 300's  Fasting 268/288  appetite is reported as good, denies nausea and or vomiting       Steroid:  Prednisone 20 mg twice daily      Recent Labs  Lab 06/26/18  0433 06/26/18  0235 06/25/18  2152 06/25/18  1811 06/25/18  1355 06/25/18  0739 06/25/18  0509 06/24/18  2059  06/24/18  0514  06/23/18  2126 06/23/18  1919  06/23/18  0533   *  --   --   --   --   --  300*  --   --  298*  --  306* 352*  --  305*   BGM  --  269* 256* 364* 384* 269*  --  256*  < >  --   < >  --   --   < >  --    < > = values in this interval not displayed.            Review of Systems:   See interval hx          Medications:       Active Diet Order      Regular Diet Adult Thin Liquids (water, ice chips, juice, milk, gelatin, ice cream, etc)     Physical Exam:  Gen: Alert, resting in bed, in NAD   HEENT: mucous membranes are moist  Resp: non-labored  Ext: moving all  "extemiteis  Neuro:oriented x3, communicating clearly  /88 (BP Location: Right arm)  Pulse 105  Temp 97.8  F (36.6  C) (Oral)  Resp 18  Ht 1.727 m (5' 8\")  Wt 76.9 kg (169 lb 8 oz)  SpO2 99%  BMI 25.77 kg/m2           Data:     Lab Results   Component Value Date    A1C 7.0 04/26/2018    A1C 6.3 04/04/2018    A1C 8.6 02/02/2018    A1C 9.1 01/08/2018    A1C 6.4 06/27/2017              CBC RESULTS:   Recent Labs   Lab Test  06/26/18   0433   WBC  21.7*   RBC  3.49*   HGB  10.3*   HCT  30.0*   MCV  86   MCH  29.5   MCHC  34.3   RDW  17.4*   PLT  301     Recent Labs   Lab Test  06/26/18   0433  06/25/18   0509   NA  129*  129*   POTASSIUM  5.7*  5.0   CHLORIDE  92*  93*   CO2  18*  19*   ANIONGAP  18*  18*   GLC  268*  300*   BUN  109*  82*   CR  8.00*  6.50*   TRINI  9.2  8.8     Liver Function Studies -   Recent Labs   Lab Test  06/23/18   2126   PROTTOTAL  7.0   ALBUMIN  3.1*   BILITOTAL  0.6   ALKPHOS  136   AST  13   ALT  26     Lab Results   Component Value Date    INR 1.07 06/26/2018    INR 1.07 06/25/2018    INR 1.07 06/24/2018    INR 1.07 06/23/2018    INR 1.09 06/22/2018    INR 1.04 06/21/2018    INR 1.04 06/20/2018    INR 1.01 06/19/2018    INR 1.02 06/18/2018    INR 1.03 06/17/2018    INR 1.16 06/16/2018    INR 1.42 06/15/2018             Anne Lentz -[phone]  Diabetes Management job code [id number]            "

## 2018-06-26 NOTE — PLAN OF CARE
Problem: Patient Care Overview  Goal: Plan of Care/Patient Progress Review  RN:  1.Pt will remain hemodynamically stable.  2.BG WNL.   OT/6C - Cancel. Pt sitting up in chair upon AM attempt; pt adamantly declining therapy until after CT scan and HD. Educated that therapy likely won't be able to check back on this date and will reschedule for tomorrow.

## 2018-06-26 NOTE — PROGRESS NOTES
HEMODIALYSIS TREATMENT NOTE    Date: 6/26/2018  Time: 2:15 PM    Data:  Pre Wt: 76.9 kg   Desired Wt: 74 kg   Ultrafiltration - Post Run Net Total Removed (mL): 0 mL  Ultrafiltration - Post Run Net Total Gain (mL): 0 mL  Vascular Access Status: Yes, secured and visible  Dialyzer Rinse: Clear  Total Blood Volume Processed: 67.7 L  Total Dialysis (Treatment) Time:  3.5 hours    Lab:   No    Assessment/Interventions:  3.5 hours of HD with Heparin via tunneled RIJ CVC on K2Ca2.5 bath. 350 BFR. Orders to keep SBP>100 and MAP>60. Attempted to remove 2.9 L to achieve desired post-run weight of 74.0 kg but SBP and MAP dropped below parameters after 1L pulled. UF turned off and nephrology paged. Multiple saline boluses (totalling 1000 mLs) given over remainder of treatment to keep VS within ordered parameters.  Patient remained asymptomatic throughout and ended up net even after boluses and rinseback. Epogen and Venofer administered as ordered. Insulin coverage given prior to and with lunch for BG and carb coverage. See MAR for medication details. CVC saline locked post-treatment. Report given to primary RN on 6C.      Plan:    Per renal team.

## 2018-06-26 NOTE — PLAN OF CARE
Problem: Patient Care Overview  Goal: Plan of Care/Patient Progress Review  RN:  1.Pt will remain hemodynamically stable.  2.BG WNL.   Outcome: No Change  D: OHT 6/14    I: Monitored vitals and assessed pt status.   Running: DL PICC- int abx  PRN:     A: A0x4. BP low during dialysis- unable to remove any fluid- but otherwise usually elevated. Per team to keep BP <140 systolic d/t AAA. PRN IVP hydralazine available for BP >140. HR 100s- ST (on terbutaline). Afebrile. Sating 90s on RA. WBC increased to 21.7 today- blood cultures drawn, CT of chest/abd/pelvis complete. Started on cefepime, vanco, flagyl. C/o occasional abdominal cramping pain- usually resolves after having BM- these have been loose/diarrhea per pt since yesterday. Cdiff negative. Regular diet- fair appetite today. BG have been elevated over the past several days- today 190s-200s. On SSI, carb coverage, lantus. Lantus dose increased this AM to 15 units. Pt up independently. Midline chest incision OBED, CDI. Old CT sites OBED, CDI. General surgery consulting d/t CT results of pneumoperitoneum. Pt pleasant, cooperative.     P: IV abx. RHC tomorrow- holding eliquis tonight and tomorrow AM. Continue to monitor Pt status and report changes to treatment team.

## 2018-06-26 NOTE — PLAN OF CARE
Problem: Patient Care Overview  Goal: Plan of Care/Patient Progress Review  RN:  1.Pt will remain hemodynamically stable.  2.BG WNL.     D: Heart transplant 6/14/18, on HD Tu, Th, Sa.  I: Insulin SS and carb coverage to manage elevated BGs. Care coordinator consult placed for help in ordering seated wheeled walker prior to discharge.(CC and CVTS aware of request). PRN tramadol given at HS.  A: ST, BPs stable, room air, denies pain. Up ad edith in room and hallway.  P: HD tomorrow. RHC/biopsy likely rescheduled from tomorrow to Wednesday.

## 2018-06-26 NOTE — PROGRESS NOTES
"      Advanced Heart Failure and Transplant Cardiology  Consult Note    Assessment and Plan:  63 year old male with a PMH of CAD, ICM (EF of 15-20%), ESRD, bicuspid aortic valve with AI, severe MR, and proximal AAA who was admitted mid-April for decompensated heart failure. He underwent OHT on 6/15/2018.        # Immunosuppression: Mycophenolate 1500 mg BID, prednisone 20 mg BID, Tacrolimus 3 mg / 3.5mg   - goal Tacro 8 - 10 ug/L until infectious source ruled out (then back to 10 - 15 ug/L)    # Prophylaxis: CMV (- / +), EBV (+ / pending): Nystatin, Valcyte, bactrim, asa 81 mg daily, Pravastatin 40 mg daily, next biopsy Wednesday 6/27    WBC continue to rise. Agree with aggressive and early work-up of this. CT C/A/P to be done today, as well as ID consult. Will be able to evaluate questionable consolidation on CXR as well. Consider adding broad spectrum antibiotics while we are waiting for work-up. RHC and EMB tomorrow, and will hold Apixaban this evening / tmrw morning.    Recommendations:  - hold Apixaban tonight and tomorrow AM (ordered)  - RHC and endomyocardial biopsy tomorrow  - reduce Tacrolimus to 3 mg BID (ordered)  - agree with aggressive infectious work-up     Discussed with Dr. Minaya.  Sukumar Mejía, CVD Fellow  ================================================================    Interval History   - no acute events  - watery stool developed overnight (he thinks ~ 3 times)  - mild fevers / chills subjectively (objectively no fevers)    Physical Exam   Temp: 97.4  F (36.3  C) Temp src: Oral BP: 105/52 Pulse: 105 Heart Rate: 102 Resp: 18 SpO2: 98 % O2 Device: None (Room air)    Vitals:    06/24/18 0500 06/25/18 0552 06/26/18 0630   Weight: 74.5 kg (164 lb 3.2 oz) 76.1 kg (167 lb 12.8 oz) 76.9 kg (169 lb 8 oz)       Heart Rate: 102, Blood pressure 105/52, pulse 105, temperature 97.4  F (36.3  C), temperature source Oral, resp. rate 18, height 1.727 m (5' 8\"), weight 76.9 kg (169 lb 8 oz), SpO2 98 %.  169 lbs 8 " oz  GEN:  NAD, eating breakfast   CV: tachycardic, no murmur, no S3 or S4  LUNGS:  Lungs with decreased breath sounds  ABD:  Active bowel sounds, soft, non-tender/non-distended.  No rebound/guarding/rigidity.  EXT:  No edema or cyanosis.      Medications:  - reviewed in [facility]    Data:  - all labs and imaging reviewed

## 2018-06-26 NOTE — PROGRESS NOTES
Care Coordinator Progress Note    Admission Date/Time:  4/16/2018  Attending MD:  Rony Caputo  Data  Chart reviewed, discussed with interdisciplinary team.   Patient was admitted for:    Chronic systolic heart failure (H)  End stage renal disease (H)  Heart transplant recipient (H)  Type 2 diabetes mellitus with stage 5 chronic kidney disease not on chronic dialysis, without long-term current use of insulin (H).    Assessment   Full assessment completed in previous note   Concerns with insurance coverage for discharge needs: none.   Current Living Situation: Patient said that his two adult sons will be staying with him.   Support System: Supportive and Involved sons: Clara, also friend Naresh Peck.   Services Involved: [facility]  Transportation at Discharge: Family or friend will provide  Transportation to Medical Appointments: family to provide.   Barriers to Discharge: post-op recovery.     Coordination of Care and Referrals: Provided patient/family with options for outpt dialysis, DME.      Assessment  OT continues to recommend OPCR and a 4 wheeled walker with a seat; pt is in agreement with this plan. Pt wants to use [facility] for DME.   Intervention:      Arrangements made with [facility] (Ph: [phone] Fax: [phone]) for restarting of outpt dialysis on T-Th-Sat at 11 AM; .       Plan  Anticipated Discharge Date:  TBD  Anticipated Discharge Plan:  Discharge to home.   CC will continue to monitor pt's medical condition and progress toward discharge.   JONATHAN BULLARD RN BSN  Care Coordinator Unit   [phone]  ADDENDUM: (6/27/18)  D: [facility] does not have contract with Medicare and pt was referred to [facility]. This writer spoke with [employer] and was told that Medicare with only pay $40 of the cost for a 4 wheeled walker with seat and the OOP cost would be $188.  Pt has opted to go online and  pay OOP for a 4 wheeled walker from [employer] which would be about $100 cheaper.      Per PA, pt will not be medically ready for discharge prior to weekend. Pt is currently receiving Cefepime IV, Vanco IV, Flagyl IV, cardiac monitoring.   I: Jacquelin at [facility] notified of delay of discharge.  P: Potential start date for outpt dialysis is now 7/3/18.

## 2018-06-26 NOTE — PROGRESS NOTES
"CLINICAL NUTRITION SERVICES    Received pt/family request for \"Patient requested meeting with dietician to discuss meal planning (target protein, carbs and fats) once he is discharged. Anticipate DC Tues or Wed.Thanks.\"    INTERVENTIONS:  Implementation:  Nutrition education for nutrition relationship to health/disease: Pt was not in room when attempting to see pt. Written handouts \"Carbohydrates and the Kidney Diet\" from [employer], \"MyPlate, MyWins: Make it Yours\"  from the [employer], and How to Read Nutrition Labels handout were left in pt's room.  Collaboration with other providers: Placed outpatient referral for pt. Notified provider     Follow up/Monitoring:  Will continue to follow pt.    Mary Silva, MS, RD, LD, [facility]   6C Pgr: [phone]       "

## 2018-06-26 NOTE — PROGRESS NOTES
CARDIOTHORACIC SURGERY PROGRESS NOTE  06/26/2018      SUBJECTIVE:    No acute issues over night.   Does report some bloating, having multiple loose stools overnight, no diarrhea. Nausea has resolved. Now feels hot and chills starting this AM.   Patient denies SOB, CP.     Patient has been tolerating diet. Diarrhea overnight  ambulating in halls. Sinus tachy.       OBJECTIVE:   Temp:  [97.7  F (36.5  C)-98.1  F (36.7  C)] 97.7  F (36.5  C)  Pulse:  [102-105] 105  Heart Rate:  [100-103] 100  Resp:  [16-18] 16  BP: (107-164)/(75-90) 148/90  SpO2:  [99 %-100 %] 99 %    Gen: A&Ox3, NAD   CV: RRR to tachycardic, normal S1, S2, no murmurs, rubs or gallops   Pulm: Lungs CTA, no wheezing or rhonchi  Abd: Soft, NT, ND, +BS  Ext: Trace LE edema  Neuro: Nonfocal  Incision: c/d/i, no erythema, sternum stable  Tubes/drains: Dressing clean and dry    I&O's:  I/O last 3 completed shifts:  In: 1220 [P.O.:1160; I.V.:60]  Out: -     Labs:   BMP    Recent Labs  Lab 06/26/18 0433 06/25/18  0509 06/24/18 0514 06/23/18 2126   * 129* 131* 130*   POTASSIUM 5.7* 5.0 4.5 3.7   CHLORIDE 92* 93* 93* 94   TRINI 9.2 8.8 9.0 9.0   CO2 18* 19* 24 22   * 82* 53* 42*   CR 8.00* 6.50* 4.69* 4.01*   * 300* 298* 306*     CBC    Recent Labs  Lab 06/26/18 0433 06/25/18  0509 06/24/18  0514 06/23/18 2126   WBC 21.7* 19.9* 16.7* 14.1*   RBC 3.49* 3.42* 3.61* 3.72*   HGB 10.3* 10.0* 10.4* 10.8*   HCT 30.0* 30.0* 32.2* 33.2*   MCV 86 88 89 89   MCH 29.5 29.2 28.8 29.0   MCHC 34.3 33.3 32.3 32.5   RDW 17.4* 17.3* 17.1* 17.1*    282 270 263     INR    Recent Labs  Lab 06/26/18  0433 06/25/18  0509 06/24/18  0514 06/23/18  0533   INR 1.07 1.07 1.07 1.07      Hepatic Panel     Recent Labs  Lab 06/23/18  2126   AST 13   ALT 26   ALKPHOS 136   BILITOTAL 0.6   ALBUMIN 3.1*     Glucose  Recent Labs  Lab 06/26/18  0433 06/26/18  0235 06/25/18  2152 06/25/18  1811 06/25/18  1355 06/25/18  0739 06/25/18  0509 06/24/18  2059   06/24/18  0514  06/23/18  2126 06/23/18  1919  06/23/18  0533   *  --   --   --   --   --  300*  --   --  298*  --  306* 352*  --  305*   BGM  --  269* 256* 364* 384* 269*  --  256*  < >  --   < >  --   --   < >  --    < > = values in this interval not displayed.    Imaging:   Recent Results (from the past 24 hour(s))   XR Abdomen 2 Views    Narrative    Exam: XR ABDOMEN 2 VW, 6/25/2018 1:40 PM    Indication: bloating;     Comparison: Abdominal radiographs dated 6/19/2018 6/18/2018.    Findings:   Upright and supine AP views of the abdomen. Bilateral chest tubes have  been removed. Partially imaged venous catheter noted with the tip  projecting over the right atrium. Partially visualized median  sternotomy wires are intact. Additional lines presumed exterior to the  patient. Redemonstration of large volume pneumoperitoneum, no  significant change compared to prior. No pneumatosis or portal venous  gas. Mild gaseous distention of the stomach with an air-fluid level  present. Mild colonic stool burden. Phleboliths noted overlying the  pelvis. Degenerative changes of the thoracolumbar spine and hips. Left  basilar airspace opacities again noted, which are better seen on  same-day chest radiograph. No acute osseous pathology.      Impression    Impression:   1. Pneumoperitoneum not significantly changed from prior.  2. Postsurgical changes and support devices as detailed above.  3. Left basilar airspace opacities, which are better seen on same-day  chest radiograph.    I have personally reviewed the examination and initial interpretation  and I agree with the findings.    SUGEY BAEZA MD   XR Chest 2 Views    Narrative    Exam: XR CHEST 2 VW, 6/25/2018 1:41 PM    Indication: Interval, leukocystosis;     Comparison: Radiograph of the chest 6/20/2018, 6/18/2018, 4/16/2018    Findings:   PA and lateral views of the chest. Right IJ central venous catheter  with the tip in the high right atrium. Midline sternotomy  wires are  intact. Pulmonary vasculature is distinct. Cardiac silhouette is  within normal limits. Hazy left lower lobe pulmonary opacity. No  pneumothorax or pleural effusion. Pneumoperitoneum.      Impression    Impression: Pneumoperitoneum.  Left lower lobe airspace opacity which  silhouettes the posterior left hemidiaphragm, and is suspicious for  infection. In retrospect this is not significantly changed since  6/18/2018, however it is new from the preceding lateral radiograph on  4/16/2018.    I have personally reviewed the examination and initial interpretation  and I agree with the findings.    CLARICE VILLARREAL MD         ASSESSMENT/PLAN: Patient is a 63 year old male with a history of CAD, ICM (EF of 15-20%), ESRD on HD, bicuspid aortic valve with AI, severe MR, and proximal AAA who was admitted for decompensated heart failure 4/16/18 now s/p heart transplant on 6/14/18. 6/20 biopsy showed 1R cellular rejection.    Neuro:  -Acute post-op pain: IV dilaudid, tylenol and oxycodone prn    CV:  - ASA 81 mg, pravastatin.  - Blood pressure (goal SBP less than 130 given aortic aneurysm): borderline controlled, on hydralazine 100 mg qid, increase isordil to 20 mg q8h 6/26  - TPW have been removed     Resp: extubated POD 2  - IS, encourage activity  - All chest tubes have been removed, CXR with stable pneumoperitoneum (likely from chest tube removal). Has LLL opacity, stable, will check CT chest 6/26      FEN/GI:   - regular diet, hold bowel regimen, Multiple BM    Renal:   - ESRD on dialsysis, on iHD, scheduled 6/26  - Volume status: dry to slightly hypervolemic, recommend pulling fluid with next HD run  - HyperK - 5.7 this am, HD today      ID: Completed daniella-op abx,   - WBC 19->21, afebrile, Is having some loose stools, c.diff negative 6/25, UA unremarkable, CXR with stable LLL opacity, will start vanc/cefipime (6/26), ID consult. CT c/a/p with and without contrast.  - Immunosuppression per cards    Heme:   - Hgb  stable, no signs or symptoms of bleeding      Endo:   - BG consistently above 200, on SSI started lantus 8U 6/25, consult endocrine      PPx:   - PPI      Anticoagulation:   - Apixaban for right IJ thrombus       Dispo:   - 6C since 6/17    Staff surgeons have been informed of changes through both verbal and written communication.         Marquis Villagomez PA-C  Cardiothoracic Surgery  June 26, 2018 7:31 AM   p: [phone]

## 2018-06-26 NOTE — PROGRESS NOTES
Nephrology Progress Note  06/26/2018            Murray Nicholson is a 63 year old year old male with PMH of HTN, DMII, functionally bicuspid aortic valve, CHF/CM (EF 10-15%), ESRD, admitted with worsening dyspnea/SOB, now S/P heart tx 6/14 and started on CRRT, Nephrology managing HD/CRRT.      Interval History  Mr Nicholson is recovering from heart tx 6/14, had been considered for discharge but WBC is up to 21k this morning, having some chills but otherwise has been feeling well.  Running HD, pulling 2-2.5L to get to EDW although will back off for any hypotension given suspected ID issues.        Assessment & Recommendations:   ESRD: due to DM, on PD since Aug 2017, changed to HD 1/18, now TTS, at [facility] under care Dr Mcpherson, RIJ tunnel, EDW 75.5-76 kg prior to tx, 3.5 hrs.                          -HD on schedule today, UF to EDW as BP's allow.                                                  -Line is tunneled RI        Volume-EDW 76kg as outpt, 76.9kg today, has been as low as 73kg but had some orthostasis.  EDW likely ~74kg for now, will plan to UF with run today to 74kg, 2.5L.        Electrolytes/pH-K 5.7 (glucose 268 at the time, bicarb 18, no acute issues.        BMD- Ca 9.2, alb 3.1 last check, phos 5.2 last check, no acute issues.      Heart Tx-On mycophenolate and methylpred.    ID-WBC up, now having some chills, no fevers.  Had pan CT this am to look for source, report pending.  No SOB, CXR yesterday did not suggest PNA.      Anemia-Cont venofer 100 mg Qtues, cont Epo 4000 u with dialysis.      Nutrition-Taking PO.      Discussed with Dr Fried      Recommendations were communicated to team via verbal communication.            Moris Sevilla  Clinical Nurse Specialist  [phone]    Review of Systems:   I reviewed the following systems:  Gen: No fevers or chills  CV: No CP   Resp: No SOB  GI: No N/V    Physical Exam:   I/O last 3 completed shifts:  In: 1220 [P.O.:1160; I.V.:60]  Out: -    /75   "Pulse 105  Temp 97.4  F (36.3  C) (Oral)  Resp 16  Ht 1.727 m (5' 8\")  Wt 76.9 kg (169 lb 8 oz)  SpO2 100%  BMI 25.77 kg/m2     GENERAL APPEARANCE: Lying in bed, in no distress  EYES:  No scleral icterus, pupils equal  HENT: mouth without ulcers or lesions  PULM: lungs clear to auscultation, equal air movement, no cyanosis or clubbing  CV: regular rhythm, normal rate, no rub     -JVP not elevated     -edema trace LE.  GI: soft, non-tender, not distended, bowel sounds are +  MS: no evidence of inflammation in joints, no muscle tenderness  NEURO: Alert and oriented  Lines-tunneled HD line    Labs:   All labs reviewed by me  Electrolytes/Renal -   Recent Labs   Lab Test  06/26/18   0433  06/25/18   0509  06/24/18   0514   06/19/18   0657  06/18/18   0827  06/17/18   1630   NA  129*  129*  131*   < >  128*  130*  130*   POTASSIUM  5.7*  5.0  4.5   < >  5.1  5.0  4.6   CHLORIDE  92*  93*  93*   < >  94  94  94   CO2  18*  19*  24   < >  18*  22  23   BUN  109*  82*  53*   < >  68*  52*  34*   CR  8.00*  6.50*  4.69*   < >  6.50*  5.01*  3.48*   GLC  268*  300*  298*   < >  170*  113*  168*   TRINI  9.2  8.8  9.0   < >  9.0  9.0  9.6   MAG   --    --    --    --   2.2  2.2  2.2   PHOS   --    --    --    --   5.2*  4.0  4.3    < > = values in this interval not displayed.       CBC -   Recent Labs   Lab Test  06/26/18   0433  06/25/18   0509  06/24/18   0514   WBC  21.7*  19.9*  16.7*   HGB  10.3*  10.0*  10.4*   PLT  301  282  270       LFTs -   Recent Labs   Lab Test  06/23/18   2126  06/18/18   0827  06/17/18   0433   ALKPHOS  136  114  105   BILITOTAL  0.6  0.8  1.1   ALT  26  34  33   AST  13  56*  92*   PROTTOTAL  7.0  6.9  7.2   ALBUMIN  3.1*  3.3*  3.5       Iron Panel -   Recent Labs   Lab Test  05/08/18   0954  07/19/17   1306  07/05/17   1204   IRON  58  46  26*   IRONSAT  27  18  12*   ALBERTINA  621*  369  542*           Current Medications:    apixaban ANTICOAGULANT  2.5 mg Oral BID     aspirin  81 mg Oral " Daily     ceFEPIme (MAXIPIME) IV  1 g Intravenous Q24H     hydrALAZINE  100 mg Oral Q6H     insulin aspart   Subcutaneous TID w/meals     insulin aspart  1-10 Units Subcutaneous TID AC     insulin aspart  1-7 Units Subcutaneous At Bedtime     insulin glargine  15 Units Subcutaneous Q24H     isosorbide dinitrate  20 mg Oral Q8H     mycophenolate  1,500 mg Oral BID IS     NEPHROCAPS  1 capsule Oral Daily     nystatin  1,000,000 Units Swish & Swallow 4x Daily     pantoprazole  40 mg Oral QAM     pravastatin  40 mg Oral Daily     predniSONE  20 mg Oral BID     senna-docusate  1 tablet Oral BID     sodium chloride (PF)  3 mL Intracatheter During Hemodialysis (from stock)     sodium chloride (PF)  3 mL Intracatheter During Hemodialysis (from stock)     sodium chloride (PF)  3 mL Intracatheter Q8H     sulfamethoxazole-trimethoprim  1 half-tab Oral QPM     tacrolimus  3 mg Oral QAM     tacrolimus  3.5 mg Oral QPM     terbutaline  5 mg Oral or Feeding Tube Q8H     [START ON 6/28/2018] valGANciclovir  450 mg Oral Once per day on Mon Thu     vancomycin (VANCOCIN) IV  1,500 mg Intravenous Once     vancomycin place bui - receiving intermittent dosing  1 each Does not apply See Admin Instructions       IV fluid REPLACEMENT ONLY       IV fluid REPLACEMENT ONLY       dextrose 5% and 0.45% NaCl 10 mL/hr at 06/15/18 0900     heparin (porcine) 1,000 Units/hr (06/26/18 1031)     Reason beta blocker order not selected

## 2018-06-26 NOTE — PROGRESS NOTES
"SPIRITUAL HEALTH SERVICES  SPIRITUAL ASSESSMENT Progress Note  [facility] ([place]) 6C     Brief check-in with pt. Pt declined visit today, said \"I'm not feeling very well today.\"    PLAN: will visit again later this week.    Navid Alonso M.Div. (Bill), [facility]  Staff   Pager [phone]      "

## 2018-06-26 NOTE — PHARMACY-VANCOMYCIN DOSING SERVICE
"Pharmacy Vancomycin Initial Note  Date of Service 2018  Patient's  1955  63 year old, male    Indication: \"Post Op\" -- possible post-op infection    Current estimated CrCl = Estimated Creatinine Clearance: 10.3 mL/min (based on Cr of 8). -- patient receives intermittent hemodialysis    Creatinine for last 3 days  2018:  9:26 PM Creatinine 4.01 mg/dL  2018:  5:14 AM Creatinine 4.69 mg/dL  2018:  5:09 AM Creatinine 6.50 mg/dL  2018:  4:33 AM Creatinine 8.00 mg/dL    Recent Vancomycin Level(s) for last 3 days  No results found for requested labs within last 72 hours.      Vancomycin IV Administrations (past 72 hours)      No vancomycin orders with administrations in past 72 hours.                Nephrotoxins and other renal medications (Future)    Start     Dose/Rate Route Frequency Ordered Stop    18 0730  vancomycin (VANCOCIN) 1,500 mg in sodium chloride 0.9 % 250 mL intermittent infusion      1,500 mg  over 90 Minutes Intravenous ONCE 18 0729      18 0728  vancomycin place bui - receiving intermittent dosing      1 each Does not apply SEE ADMIN INSTRUCTIONS 18 0729      18 0800  tacrolimus (GENERIC EQUIVALENT) capsule 3 mg      3 mg Oral EVERY MORNING. 18 1305      18 1800  tacrolimus (GENERIC EQUIVALENT) capsule 3.5 mg      3.5 mg Oral EVERY EVENING 18 1304            Contrast Orders - past 72 hours     None                Plan:  1.  Start vancomycin  1500 mg (~ 20 mg/kg) IV x1 dose, then dosed intermittently based on vancomycin levels and dialysis schedule.  2.  Goal Trough Level: 15-20 mg/L   3.  Pharmacy will check trough levels as appropriate in 1-3 Days.  Will follow up when next dialysis run is ordered to determine definitive plan  4. Serum creatinine levels  -- patient is hemodialysis dependent.  5. Hawley method utilized to dose vancomycin therapy: Method 2    Vancomycin is a restricted antibiotic and requires " ID/Antimicrobial Management Team (AMT) approval for empiric use beyond 48 hours    Shakeel Melchor, PharmD -- pager [phone]

## 2018-06-26 NOTE — PLAN OF CARE
Problem: Patient Care Overview  Goal: Plan of Care/Patient Progress Review  RN:  1.Pt will remain hemodynamically stable.  2.BG WNL.   Outcome: No Change  Pt a/o x 4 overnight. VSS. Sats 99% RA. Denies pain. Tele ST . Has R tunnel cath for HD which is scheduled today. L DL PICC flushed. Possible RHC tomorrow. Needs sputum sample sent. Continues to have elevated BG and WBC. Will continue to monitor pt.     0616 Pt triggered sepsis protocol this am. WBC up to 21.7. K is 5.7- MD notified. To have HD today. Will continue to monitor.

## 2018-06-27 ENCOUNTER — APPOINTMENT (OUTPATIENT)
Dept: GENERAL RADIOLOGY | Facility: CLINIC | Age: 63
DRG: 001 | End: 2018-06-27
Attending: PHYSICIAN ASSISTANT
Payer: COMMERCIAL

## 2018-06-27 ENCOUNTER — APPOINTMENT (OUTPATIENT)
Dept: CARDIOLOGY | Facility: CLINIC | Age: 63
DRG: 001 | End: 2018-06-27
Attending: STUDENT IN AN ORGANIZED HEALTH CARE EDUCATION/TRAINING PROGRAM
Payer: COMMERCIAL

## 2018-06-27 LAB
ANION GAP SERPL CALCULATED.3IONS-SCNC: 14 MMOL/L (ref 3–14)
APTT PPP: 29 SEC (ref 22–37)
BASOPHILS # BLD AUTO: 0 10E9/L (ref 0–0.2)
BASOPHILS NFR BLD AUTO: 0.1 %
BUN SERPL-MCNC: 51 MG/DL (ref 7–30)
CALCIUM SERPL-MCNC: 8.3 MG/DL (ref 8.5–10.1)
CHLORIDE SERPL-SCNC: 99 MMOL/L (ref 94–109)
CO2 SERPL-SCNC: 22 MMOL/L (ref 20–32)
CREAT SERPL-MCNC: 4.95 MG/DL (ref 0.66–1.25)
DIFFERENTIAL METHOD BLD: ABNORMAL
EOSINOPHIL # BLD AUTO: 0 10E9/L (ref 0–0.7)
EOSINOPHIL NFR BLD AUTO: 0.1 %
ERYTHROCYTE [DISTWIDTH] IN BLOOD BY AUTOMATED COUNT: 17.7 % (ref 10–15)
GFR SERPL CREATININE-BSD FRML MDRD: 12 ML/MIN/1.7M2
GLUCOSE BLDC GLUCOMTR-MCNC: 162 MG/DL (ref 70–99)
GLUCOSE BLDC GLUCOMTR-MCNC: 180 MG/DL (ref 70–99)
GLUCOSE BLDC GLUCOMTR-MCNC: 193 MG/DL (ref 70–99)
GLUCOSE BLDC GLUCOMTR-MCNC: 226 MG/DL (ref 70–99)
GLUCOSE SERPL-MCNC: 200 MG/DL (ref 70–99)
HCT VFR BLD AUTO: 26.7 % (ref 40–53)
HGB BLD-MCNC: 8.7 G/DL (ref 13.3–17.7)
HGB BLD-MCNC: 9.4 G/DL (ref 13.3–17.7)
IMM GRANULOCYTES # BLD: 0 10E9/L (ref 0–0.4)
IMM GRANULOCYTES NFR BLD: 0.3 %
INR PPP: 1.1 (ref 0.86–1.14)
LACTATE BLD-SCNC: 0.9 MMOL/L (ref 0.7–2)
LYMPHOCYTES # BLD AUTO: 0.7 10E9/L (ref 0.8–5.3)
LYMPHOCYTES NFR BLD AUTO: 4.4 %
MCH RBC QN AUTO: 29 PG (ref 26.5–33)
MCHC RBC AUTO-ENTMCNC: 32.6 G/DL (ref 31.5–36.5)
MCV RBC AUTO: 89 FL (ref 78–100)
MONOCYTES # BLD AUTO: 0.2 10E9/L (ref 0–1.3)
MONOCYTES NFR BLD AUTO: 1.6 %
NEUTROPHILS # BLD AUTO: 13.8 10E9/L (ref 1.6–8.3)
NEUTROPHILS NFR BLD AUTO: 93.5 %
NRBC # BLD AUTO: 0 10*3/UL
NRBC BLD AUTO-RTO: 0 /100
PLATELET # BLD AUTO: 208 10E9/L (ref 150–450)
POTASSIUM SERPL-SCNC: 4.3 MMOL/L (ref 3.4–5.3)
RBC # BLD AUTO: 3 10E12/L (ref 4.4–5.9)
SODIUM SERPL-SCNC: 135 MMOL/L (ref 133–144)
WBC # BLD AUTO: 14.7 10E9/L (ref 4–11)

## 2018-06-27 PROCEDURE — 27210982 ZZH KIT RT HC TOTES DISP CR7

## 2018-06-27 PROCEDURE — 80048 BASIC METABOLIC PNL TOTAL CA: CPT | Performed by: STUDENT IN AN ORGANIZED HEALTH CARE EDUCATION/TRAINING PROGRAM

## 2018-06-27 PROCEDURE — 27211181 ZZH BALLOON TIP PRESSURE CR5

## 2018-06-27 PROCEDURE — 99024 POSTOP FOLLOW-UP VISIT: CPT | Performed by: INTERNAL MEDICINE

## 2018-06-27 PROCEDURE — 00000146 ZZHCL STATISTIC GLUCOSE BY METER IP

## 2018-06-27 PROCEDURE — 27210806 ZZH SHEATH CR5

## 2018-06-27 PROCEDURE — 25000132 ZZH RX MED GY IP 250 OP 250 PS 637: Performed by: STUDENT IN AN ORGANIZED HEALTH CARE EDUCATION/TRAINING PROGRAM

## 2018-06-27 PROCEDURE — 27211047 ZZH FORCEP BIOPSY CR10

## 2018-06-27 PROCEDURE — 88350 IMFLUOR EA ADDL 1ANTB STN PX: CPT | Performed by: THORACIC SURGERY (CARDIOTHORACIC VASCULAR SURGERY)

## 2018-06-27 PROCEDURE — 25000132 ZZH RX MED GY IP 250 OP 250 PS 637: Mod: GY | Performed by: PHYSICIAN ASSISTANT

## 2018-06-27 PROCEDURE — 25000131 ZZH RX MED GY IP 250 OP 636 PS 637: Performed by: INTERNAL MEDICINE

## 2018-06-27 PROCEDURE — 25000125 ZZHC RX 250: Performed by: PHYSICIAN ASSISTANT

## 2018-06-27 PROCEDURE — 85730 THROMBOPLASTIN TIME PARTIAL: CPT | Performed by: STUDENT IN AN ORGANIZED HEALTH CARE EDUCATION/TRAINING PROGRAM

## 2018-06-27 PROCEDURE — 74019 RADEX ABDOMEN 2 VIEWS: CPT

## 2018-06-27 PROCEDURE — 25000132 ZZH RX MED GY IP 250 OP 250 PS 637: Performed by: PHYSICIAN ASSISTANT

## 2018-06-27 PROCEDURE — 02BM3ZX EXCISION OF VENTRICULAR SEPTUM, PERCUTANEOUS APPROACH, DIAGNOSTIC: ICD-10-PCS | Performed by: INTERNAL MEDICINE

## 2018-06-27 PROCEDURE — 25000132 ZZH RX MED GY IP 250 OP 250 PS 637: Performed by: THORACIC SURGERY (CARDIOTHORACIC VASCULAR SURGERY)

## 2018-06-27 PROCEDURE — 4A023N6 MEASUREMENT OF CARDIAC SAMPLING AND PRESSURE, RIGHT HEART, PERCUTANEOUS APPROACH: ICD-10-PCS | Performed by: INTERNAL MEDICINE

## 2018-06-27 PROCEDURE — 93505 ENDOMYOCARDIAL BIOPSY: CPT | Mod: 26 | Performed by: INTERNAL MEDICINE

## 2018-06-27 PROCEDURE — 85610 PROTHROMBIN TIME: CPT | Performed by: STUDENT IN AN ORGANIZED HEALTH CARE EDUCATION/TRAINING PROGRAM

## 2018-06-27 PROCEDURE — 21400006 ZZH R&B CCU INTERMEDIATE UMMC

## 2018-06-27 PROCEDURE — 93505 ENDOMYOCARDIAL BIOPSY: CPT

## 2018-06-27 PROCEDURE — 85018 HEMOGLOBIN: CPT | Performed by: PHYSICIAN ASSISTANT

## 2018-06-27 PROCEDURE — 25000131 ZZH RX MED GY IP 250 OP 636 PS 637: Performed by: STUDENT IN AN ORGANIZED HEALTH CARE EDUCATION/TRAINING PROGRAM

## 2018-06-27 PROCEDURE — 25000128 H RX IP 250 OP 636: Performed by: PHYSICIAN ASSISTANT

## 2018-06-27 PROCEDURE — 83605 ASSAY OF LACTIC ACID: CPT | Performed by: STUDENT IN AN ORGANIZED HEALTH CARE EDUCATION/TRAINING PROGRAM

## 2018-06-27 PROCEDURE — 85025 COMPLETE CBC W/AUTO DIFF WBC: CPT | Performed by: STUDENT IN AN ORGANIZED HEALTH CARE EDUCATION/TRAINING PROGRAM

## 2018-06-27 PROCEDURE — C1893 INTRO/SHEATH, FIXED,NON-PEEL: HCPCS

## 2018-06-27 PROCEDURE — 25000125 ZZHC RX 250: Performed by: INTERNAL MEDICINE

## 2018-06-27 PROCEDURE — 36592 COLLECT BLOOD FROM PICC: CPT | Performed by: PHYSICIAN ASSISTANT

## 2018-06-27 PROCEDURE — 40000802 ZZH SITE CHECK

## 2018-06-27 PROCEDURE — 36592 COLLECT BLOOD FROM PICC: CPT | Performed by: STUDENT IN AN ORGANIZED HEALTH CARE EDUCATION/TRAINING PROGRAM

## 2018-06-27 PROCEDURE — 88346 IMFLUOR 1ST 1ANTB STAIN PX: CPT | Performed by: THORACIC SURGERY (CARDIOTHORACIC VASCULAR SURGERY)

## 2018-06-27 PROCEDURE — 88307 TISSUE EXAM BY PATHOLOGIST: CPT | Performed by: THORACIC SURGERY (CARDIOTHORACIC VASCULAR SURGERY)

## 2018-06-27 PROCEDURE — A9270 NON-COVERED ITEM OR SERVICE: HCPCS | Mod: GY | Performed by: PHYSICIAN ASSISTANT

## 2018-06-27 PROCEDURE — 27210788 ZZH MANIFOLD CR3

## 2018-06-27 PROCEDURE — A9270 NON-COVERED ITEM OR SERVICE: HCPCS | Mod: GY | Performed by: STUDENT IN AN ORGANIZED HEALTH CARE EDUCATION/TRAINING PROGRAM

## 2018-06-27 RX ORDER — IOPAMIDOL 755 MG/ML
5 INJECTION, SOLUTION INTRAVASCULAR ONCE
Status: COMPLETED | OUTPATIENT
Start: 2018-06-27 | End: 2018-06-27

## 2018-06-27 RX ADMIN — IOPAMIDOL 5 ML: 755 INJECTION, SOLUTION INTRAVASCULAR at 12:00

## 2018-06-27 RX ADMIN — HYDRALAZINE HYDROCHLORIDE 100 MG: 100 TABLET ORAL at 05:39

## 2018-06-27 RX ADMIN — PREDNISONE 20 MG: 20 TABLET ORAL at 20:32

## 2018-06-27 RX ADMIN — PRAVASTATIN SODIUM 40 MG: 40 TABLET ORAL at 20:32

## 2018-06-27 RX ADMIN — NYSTATIN 1000000 UNITS: 100000 SUSPENSION ORAL at 13:04

## 2018-06-27 RX ADMIN — ASPIRIN 81 MG: 81 TABLET, COATED ORAL at 09:02

## 2018-06-27 RX ADMIN — TERBUTALINE SULFATE 5 MG: 5 TABLET ORAL at 13:04

## 2018-06-27 RX ADMIN — METRONIDAZOLE 500 MG: 500 INJECTION, SOLUTION INTRAVENOUS at 09:09

## 2018-06-27 RX ADMIN — APIXABAN 2.5 MG: 2.5 TABLET, FILM COATED ORAL at 20:32

## 2018-06-27 RX ADMIN — TERBUTALINE SULFATE 5 MG: 5 TABLET ORAL at 22:10

## 2018-06-27 RX ADMIN — NYSTATIN 1000000 UNITS: 100000 SUSPENSION ORAL at 09:03

## 2018-06-27 RX ADMIN — NYSTATIN 1000000 UNITS: 100000 SUSPENSION ORAL at 16:24

## 2018-06-27 RX ADMIN — HYDRALAZINE HYDROCHLORIDE 100 MG: 100 TABLET ORAL at 18:25

## 2018-06-27 RX ADMIN — ISOSORBIDE DINITRATE 10 MG: 10 TABLET ORAL at 22:10

## 2018-06-27 RX ADMIN — TACROLIMUS 3 MG: 1 CAPSULE ORAL at 09:02

## 2018-06-27 RX ADMIN — Medication 1 CAPSULE: at 09:03

## 2018-06-27 RX ADMIN — MYCOPHENOLATE MOFETIL 1500 MG: 250 CAPSULE ORAL at 18:25

## 2018-06-27 RX ADMIN — MYCOPHENOLATE MOFETIL 1500 MG: 250 CAPSULE ORAL at 09:01

## 2018-06-27 RX ADMIN — PREDNISONE 20 MG: 20 TABLET ORAL at 09:03

## 2018-06-27 RX ADMIN — ISOSORBIDE DINITRATE 10 MG: 10 TABLET ORAL at 05:39

## 2018-06-27 RX ADMIN — PANTOPRAZOLE SODIUM 40 MG: 40 TABLET, DELAYED RELEASE ORAL at 09:02

## 2018-06-27 RX ADMIN — ISOSORBIDE DINITRATE 10 MG: 10 TABLET ORAL at 13:04

## 2018-06-27 RX ADMIN — TERBUTALINE SULFATE 5 MG: 5 TABLET ORAL at 05:39

## 2018-06-27 RX ADMIN — METRONIDAZOLE 500 MG: 500 INJECTION, SOLUTION INTRAVENOUS at 16:21

## 2018-06-27 RX ADMIN — TACROLIMUS 3 MG: 1 CAPSULE ORAL at 18:26

## 2018-06-27 RX ADMIN — NYSTATIN 1000000 UNITS: 100000 SUSPENSION ORAL at 20:33

## 2018-06-27 RX ADMIN — HYDRALAZINE HYDROCHLORIDE 100 MG: 100 TABLET ORAL at 13:04

## 2018-06-27 RX ADMIN — Medication 1 HALF-TAB: at 20:34

## 2018-06-27 RX ADMIN — CEFEPIME HYDROCHLORIDE 1 G: 1 INJECTION, POWDER, FOR SOLUTION INTRAMUSCULAR; INTRAVENOUS at 18:22

## 2018-06-27 NOTE — PLAN OF CARE
Problem: Patient Care Overview  Goal: Plan of Care/Patient Progress Review  RN:  1.Pt will remain hemodynamically stable.  2.BG WNL.   OT/6C: Cancel. Pt with procedure this AM, unable to check back this PM due to scheduling conflict. Will reschedule for tomorrow.

## 2018-06-27 NOTE — PROGRESS NOTES
Surgery Progress Note  Murray Nicholson  [id number]    S:  No acute events overnight. Reports some lower abdominal cramping but no sharp pain. Feels he is doing ok overall.    O:  Temp:  [97.8  F (36.6  C)-98.4  F (36.9  C)] 98.1  F (36.7  C)  Heart Rate:  [100-107] 106  Resp:  [16-18] 16  BP: ()/(49-85) 130/85  Cuff Mean (mmHg):  [58-98] 95  SpO2:  [96 %-99 %] 98 %    I/O last 3 completed shifts:  In: 2060 [P.O.:1390; I.V.:670]  Out: 0     Gen: A&Ox3, NAD. Calm and cooperative with exam, appears well.  Resp: non-labored breathing on room air.  CV: RRR.  Abd: soft, non-distended. Mildly TTP.  Ext: warm and well perfused.  Incisions: Three 2-3 cm horizontal incisions from prior tube placement visible over epigastric region of abdomen. Small amount of dried blood but no active discharge.    BMP  Recent Labs  Lab 06/27/18  0629 06/26/18  0433 06/25/18  0509 06/24/18  0514    129* 129* 131*   POTASSIUM 4.3 5.7* 5.0 4.5   CHLORIDE 99 92* 93* 93*   TRINI 8.3* 9.2 8.8 9.0   CO2 22 18* 19* 24   BUN 51* 109* 82* 53*   CR 4.95* 8.00* 6.50* 4.69*   * 268* 300* 298*     CBC  Recent Labs  Lab 06/27/18  0629 06/26/18  0433 06/25/18  0509 06/24/18  0514   WBC 14.7* 21.7* 19.9* 16.7*   RBC 3.00* 3.49* 3.42* 3.61*   HGB 8.7* 10.3* 10.0* 10.4*   HCT 26.7* 30.0* 30.0* 32.2*   MCV 89 86 88 89   MCH 29.0 29.5 29.2 28.8   MCHC 32.6 34.3 33.3 32.3   RDW 17.7* 17.4* 17.3* 17.1*    301 282 270     INR  Recent Labs  Lab 06/27/18  0629 06/26/18  0433 06/25/18  0509 06/24/18  0514   INR 1.10 1.07 1.07 1.07        Imaging  CT abdomen/pelvis from 6/26: Postoperative changes of heart transplantation and aortic reconstruction visible in addition to large amount of pneumoperitoneum.          A/P: 63 year old male with a history of ESRD on HD, CAD, CHF POD#11 s/p OHT with pneumoperitoneum since 6/17/18. Leukocytosis to 21.7. CT imaging demonstrating large pneumoperitoneum with uncertain source. Clinical abdominal exam is benign  with low suspicion for intraabdominal catastrophe. Given unconcerning exam in otherwise relatively stable patient, together with downtrending WBC, pathologic source for pneumoperitoneum such as perforation is highly unlikely and most likely due to defect in diaphragm from previous tube insertion through abdomen.  - No indication for surgery at this time  - Recommend proceeding with conservative management including pain control and IVF if oral intake is insufficient  - Call surgery with questions    Discussed with Dr. Dunlap, and staffed with attending Dr. Mcmullen.    Yaz Campbell MD   PGY-1 Surgery Resident  Pager: [phone]

## 2018-06-27 NOTE — PROGRESS NOTES
Diabetes Consult Daily  Progress Note          Assessment/Plan:    Murray Nicholson is a 63 year old male with history of type 2 diabetes, hypertension, hyperlipidemia, ESRD ( HD, TTS), ICM ( EF 10 to 15%) due to valvular hear disease who was admitted for decompensated heart failure (4/16/2018) now s/p orthotopic  heart transplant (6/14/2018).        Type 2 diabetes: PTA glipizide 5 mg   Prednisone 20 mg bid    BG improving with yesterday's increases in insulin doses.  Is NPO today for Heart Cath Will continue to monitor BG today  No changes to plan currently     Plan  - continue glargine  15 units every 24 hours ( 0800)  -continue Prandial insulin, from  Novolog  1 unit per 10 grams  of CHO for meals and snacks  Continue Aspart high intensity sliding scale before meals and HS  -monitor glucose before meals HS and 0200  -Recommend starting IV insulin if glucose > 350 x 2     Will continue to follow                        Outpatient diabetes follow up: TBD  Plan discussed with patient           Interval History:   The last 24 hours progress and nursing notes reviewed.    Blood sugars improved with increase of lantus to 15 units and increased prandial and increased correction intensity yesterday   0800 today , remained 190-200 while NPO today,   appetite is reported as good, denies nausea and or vomiting. still some diarrhea ,but improving   Is NPO for heart Cath today  HD yesterday, scheduled for T, Th , Sat  Possible Discharge on Monday      Steroid:  Prednisone 20 mg twice daily      Recent Labs  Lab 06/27/18  0806 06/27/18  0629 06/27/18  0313 06/26/18  2111 06/26/18  1822 06/26/18  1717 06/26/18  1243  06/26/18  0433  06/25/18  0509  06/24/18  0514  06/23/18  2126 06/23/18  1919   GLC  --  200*  --   --   --   --   --   --  268*  --  300*  --  298*  --  306* 352*   *  --  162* 278* 211* 228* 195*  < >  --   < >  --   < >  --   < >  --   --    < > = values in this interval not  "displayed.            Review of Systems:   See interval hx          Medications:       Active Diet Order      NPO per Anesthesia Guidelines for Procedure/Surgery Except for: Meds     Physical Exam:  Gen: Alert, resting in bed, in NAD   HEENT: mucous membranes are moist  Resp: non-labored  Ext: moving all extemiteis  Neuro:oriented x3, communicating clearly  /85 (BP Location: Right arm)  Pulse 105  Temp 98.1  F (36.7  C) (Oral)  Resp 16  Ht 1.727 m (5' 8\")  Wt 78 kg (172 lb)  SpO2 98%  BMI 26.15 kg/m2           Data:     Lab Results   Component Value Date    A1C 7.0 04/26/2018    A1C 6.3 04/04/2018    A1C 8.6 02/02/2018    A1C 9.1 01/08/2018    A1C 6.4 06/27/2017              CBC RESULTS:   Recent Labs   Lab Test  06/26/18   0433   WBC  21.7*   RBC  3.49*   HGB  10.3*   HCT  30.0*   MCV  86   MCH  29.5   MCHC  34.3   RDW  17.4*   PLT  301     Recent Labs   Lab Test  06/26/18   0433  06/25/18   0509   NA  129*  129*   POTASSIUM  5.7*  5.0   CHLORIDE  92*  93*   CO2  18*  19*   ANIONGAP  18*  18*   GLC  268*  300*   BUN  109*  82*   CR  8.00*  6.50*   TRINI  9.2  8.8     Liver Function Studies -   Recent Labs   Lab Test  06/23/18   2126   PROTTOTAL  7.0   ALBUMIN  3.1*   BILITOTAL  0.6   ALKPHOS  136   AST  13   ALT  26     Lab Results   Component Value Date    INR 1.07 06/26/2018    INR 1.07 06/25/2018    INR 1.07 06/24/2018    INR 1.07 06/23/2018    INR 1.09 06/22/2018    INR 1.04 06/21/2018    INR 1.04 06/20/2018    INR 1.01 06/19/2018    INR 1.02 06/18/2018    INR 1.03 06/17/2018    INR 1.16 06/16/2018    INR 1.42 06/15/2018       Missy Retana [phone]  Diabetes Management job code [id number]            "

## 2018-06-27 NOTE — PROGRESS NOTES
Nephrology Progress Note  06/27/2018         Murray Nicholson is a 63 year old year old male with PMH of HTN, DMII, functionally bicuspid aortic valve, CHF/CM (EF 10-15%), ESRD, admitted with worsening dyspnea/SOB, now S/P heart tx 6/14 and started on CRRT, Nephrology managing HD/CRRT.      Interval History  Mr Nicholson is recovering from heart tx 6/14, had been considered for discharge but WBC was up to 21k yesterday, down to 15k today.  No UF with run yesterday due to some borderline BP's but breathing comfortably and BP's reasonable.  Plan for run tomorrow, may discharge in next 1-2 days.          Assessment & Recommendations:   ESRD: due to DM, on PD since Aug 2017, changed to HD 1/18, now TTS, at [facility] under care Dr Mcpherson, RIJ tunnel, EDW 75.5-76 kg prior to tx, 3.5 hrs.                          -HD on schedule tomorrow.                         -Line is tunneled RIJ        Volume-EDW 76kg as outpt, 78 today as we were unable to pull UF yesterday with some borderline BP's.  WBC down today, will try to pull UF again tomorrow as able.        Electrolytes/pH-K 4.3, bicarb 22, no acute issues.        BMD- Ca 8.3, alb 3.1 last check, phos 5.2 last check, no acute issues.      Heart Tx-On mycophenolate and methylpred.     ID-WBC up, now having some chills, no fevers.  Had pan CT this am to look for source, report pending.  No SOB, CXR yesterday did not suggest PNA.  On bactrim, Vanco, cefepime and flagyl.        Anemia-Cont venofer 100 mg Qtues, cont Epo 4000 u with dialysis.      Nutrition-Taking PO.      Discussed with Dr Fried      Recommendations were communicated to team via verbal communication.         Moris Sevilla  Clinical Nurse Specialist  [phone]    Review of Systems:   I reviewed the following systems:  Gen: No fevers or chills  CV: No CP   Resp: No SOB  GI: No N/V    Physical Exam:   I/O last 3 completed shifts:  In: 2060 [P.O.:1390; I.V.:670]  Out: 0    /85 (BP Location: Right arm)   "Pulse 105  Temp 98.1  F (36.7  C) (Oral)  Resp 16  Ht 1.727 m (5' 8\")  Wt 78 kg (172 lb)  SpO2 98%  BMI 26.15 kg/m2     GENERAL APPEARANCE: Lying in bed, in no distress  EYES:  No scleral icterus, pupils equal  HENT: mouth without ulcers or lesions  PULM: lungs clear to auscultation, equal air movement, no cyanosis or clubbing  CV: regular rhythm, normal rate, no rub     -JVP not elevated     -edema trace LE.  GI: soft, non-tender, not distended, bowel sounds are +  MS: no evidence of inflammation in joints, no muscle tenderness  NEURO: Alert and oriented  Lines-tunneled HD line    Labs:   All labs reviewed by me  Electrolytes/Renal -   Recent Labs   Lab Test  06/27/18   0629  06/26/18   0433  06/25/18   0509   06/19/18   0657  06/18/18   0827  06/17/18   1630   NA  135  129*  129*   < >  128*  130*  130*   POTASSIUM  4.3  5.7*  5.0   < >  5.1  5.0  4.6   CHLORIDE  99  92*  93*   < >  94  94  94   CO2  22  18*  19*   < >  18*  22  23   BUN  51*  109*  82*   < >  68*  52*  34*   CR  4.95*  8.00*  6.50*   < >  6.50*  5.01*  3.48*   GLC  200*  268*  300*   < >  170*  113*  168*   TRINI  8.3*  9.2  8.8   < >  9.0  9.0  9.6   MAG   --    --    --    --   2.2  2.2  2.2   PHOS   --    --    --    --   5.2*  4.0  4.3    < > = values in this interval not displayed.       CBC -   Recent Labs   Lab Test  06/27/18   0629  06/26/18   0433  06/25/18   0509   WBC  14.7*  21.7*  19.9*   HGB  8.7*  10.3*  10.0*   PLT  208  301  282       LFTs -   Recent Labs   Lab Test  06/23/18 2126  06/18/18   0827  06/17/18   0433   ALKPHOS  136  114  105   BILITOTAL  0.6  0.8  1.1   ALT  26  34  33   AST  13  56*  92*   PROTTOTAL  7.0  6.9  7.2   ALBUMIN  3.1*  3.3*  3.5       Iron Panel -   Recent Labs   Lab Test  05/08/18   0954  07/19/17   1306  07/05/17   1204   IRON  58  46  26*   IRONSAT  27  18  12*   ALBERTINA  621*  369  542*           Current Medications:    aspirin  81 mg Oral Daily     ceFEPIme (MAXIPIME) IV  1 g Intravenous Q24H     " hydrALAZINE  100 mg Oral Q6H     insulin aspart   Subcutaneous TID w/meals     insulin aspart  1-10 Units Subcutaneous TID AC     insulin aspart  1-7 Units Subcutaneous At Bedtime     insulin glargine  15 Units Subcutaneous Q24H     isosorbide dinitrate  10 mg Oral Q8H     metroNIDAZOLE  500 mg Intravenous Q8H     mycophenolate  1,500 mg Oral BID IS     NEPHROCAPS  1 capsule Oral Daily     nystatin  1,000,000 Units Swish & Swallow 4x Daily     pantoprazole  40 mg Oral QAM     pravastatin  40 mg Oral Daily     predniSONE  20 mg Oral BID     senna-docusate  1 tablet Oral BID     sodium chloride (PF)  3 mL Intracatheter Q8H     sulfamethoxazole-trimethoprim  1 half-tab Oral QPM     tacrolimus  3 mg Oral QPM     tacrolimus  3 mg Oral QAM     terbutaline  5 mg Oral or Feeding Tube Q8H     [START ON 6/28/2018] valGANciclovir  450 mg Oral Once per day on Mon Thu     vancomycin place bui - receiving intermittent dosing  1 each Does not apply See Admin Instructions       IV fluid REPLACEMENT ONLY       IV fluid REPLACEMENT ONLY       dextrose 5% and 0.45% NaCl 10 mL/hr at 06/15/18 0900     Reason beta blocker order not selected

## 2018-06-27 NOTE — PROCEDURES
PRELIMINARY CARDIAC CATH REPORT:     PROCEDURES PERFORMED:   Endomyocardial Biopsy  Right Heart Catheterization    PHYSICIANS:  Attending Physician: Klever Ernst MD  Cardiology Fellow: Zeke Balderrama MD, PhD    INDICATION:  Murray Nicholson is a 63 year old male with OHTx on 6/14/18 for ischemic cardiomyopathy.  RHC and endomyocardial biopsy per post transplant surveillance.     DESCRIPTION:  1. Consent obtained with discussion of risks.  All questions were answered.  2. Sterile prep and procedure.  3. Location with Sheaths:   Lf IJ  7 Fr 10 cm [short]  4. Access: Local anesthetic with lidocaine.  An 18 gauge micropuncture needle with ultrasound guidance was used to establish vascular access using a modified Seldinger technique.  5. Diagnostic Catheters:   7 Fr  [place] Jax  6. Estimated blood loss: < 5 ml    Procedures:    Right Heart Cath  RA: 3/6/3  RV: 25/3  PA: 30/15 (20)  PCWP: 7  PA Sat: 70.7  Estimated Kassi CO/CI: 6.7/3.5  TD CO/CI: 7.4/3.9  PVR: 1.8    ENDOMYOCARDIAL BIOPSY:  1. Successful collection of 4 right ventricular endomyocardial biopsy samples using the Jawz.    Sheath Removal:  The venous sheath was manually removed in the cardiac catheterization laboratory.    Contrast: Isovue, 5 ml     Fluoroscopy Time: 5 min    COMPLICATIONS:  1. None    SUMMARY:   -Normal hemodynamic evaluation with successful collection of 4 endomyocardial biopsy specimens    PLAN:   >> Return to Cards 2 team with follow up of endomyocardial biopsy results    The attending interventional cardiologist was present and supervised all critical aspects the procedure.    Findings discussed with Dr. Minaya.    See CVIS report for final draft.    Zeke Balderrama MD, PhD  Cardiology Fellow    Klever Ernst MD  Cardiology Staff

## 2018-06-27 NOTE — PROGRESS NOTES
Problem: Patient Care Overview  Goal: Plan of Care/Patient Progress Review  RN:  1.Pt will remain hemodynamically stable.  2.BG WNL.   Outcome: No Change  D: OHT 6/14     I: Monitored vitals and assessed pt status.   Running: DL PICC- int abx  PRN:      A: A0x4. BP WNL today- no PRN hydral given. Per team to keep BP <140 systolic d/t AAA. PRN IVP hydralazine available for BP >140. HR 100s- ST (on terbutaline). Afebrile. Sating 90s on RA. WBC decreased to 14.7 today- but could be dilutional- as platelets and Hgb also dropped. Hgb 8.7 this AM- recheck this afternoon. Continues on IV abx- ID following. Continues to c/o abdominal cramping pain today- usually resolves after having BM- loose/diarrhea. Blood streaks noted by provider in one BM- likely d/t irritation, continue to monitor. Cdiff negative. No other pain complaints. Regular diet- fair appetite today. BG have been elevated over the past several days- today 180-200s. On SSI, carb coverage, lantus. Pt up independently. Midline chest incision OBED, CDI. Old CT sites OBED, CDI. General surgery consulting d/t CT results of pneumoperitoneum- no immediate concerns from their standpoint- will continue to monitor. RHC/biopsy completed today- eliquis to be restarted this evening. Pt pleasant, cooperative.      P: IV abx- ID following. Dialysis tomorrow. Hgb draw @ 1400. Continue to monitor Pt status and report changes to treatment team.

## 2018-06-27 NOTE — PLAN OF CARE
Problem: Patient Care Overview  Goal: Plan of Care/Patient Progress Review  RN:  1.Pt will remain hemodynamically stable.  2.BG WNL.   VSS, ex tachy. On scheduled hydralazine, keep SBP <140. On RA sating 96-97%. Denies little to no pain. DL PICC TKO. On IV abx. Old CT sites OBED. Midline chest incision, CDI. Up independently. Soft/loose stools x1. Voiding minimally, on HD. Tolerating regular diet. Requested to talk to nutritionist about his nutritional needs and goals.  Sputum sample needed, pt aware. , Sinus tachy. A/Ox4, calling appropriately and making needs known.  RHC in AM. Will continue to monitor and follow POC.

## 2018-06-27 NOTE — PROGRESS NOTES
CARDIOTHORACIC SURGERY PROGRESS NOTE  06/27/2018      SUBJECTIVE:    No acute issues over night.   Does report some bloating again today. Continues to have diarrhea. No nausea. Reports small amount of blood on toilet paper this am, burning sensation with bowel movements.  Patient denies SOB, CP.     Patient has been tolerating diet. Diarrhea again overnight  ambulating in halls. Sinus tachy.       OBJECTIVE:   Temp:  [97.4  F (36.3  C)-98.4  F (36.9  C)] 98.1  F (36.7  C)  Heart Rate:  [] 106  Resp:  [16-18] 16  BP: ()/(47-85) 130/85  Cuff Mean (mmHg):  [] 95  SpO2:  [96 %-100 %] 98 %    Gen: A&Ox3, NAD   CV: RRR to tachycardic, normal S1, S2, no murmurs, rubs or gallops   Pulm: Lungs CTA, no wheezing or rhonchi  Abd: Soft, ND, +BS, mild cramping/tenderness in lower abdomen  Ext: Trace LE edema  Neuro: Nonfocal  Incision: c/d/i, no erythema, sternum stable  Tubes/drains: Dressing clean and dry    I&O's:  I/O last 3 completed shifts:  In: 2060 [P.O.:1390; I.V.:670]  Out: 0     Labs:   BMP    Recent Labs  Lab 06/27/18 0629 06/26/18  0433 06/25/18  0509 06/24/18  0514    129* 129* 131*   POTASSIUM 4.3 5.7* 5.0 4.5   CHLORIDE 99 92* 93* 93*   TRINI 8.3* 9.2 8.8 9.0   CO2 22 18* 19* 24   BUN 51* 109* 82* 53*   CR 4.95* 8.00* 6.50* 4.69*   * 268* 300* 298*     CBC    Recent Labs  Lab 06/27/18  0629 06/26/18  0433 06/25/18  0509 06/24/18  0514   WBC 14.7* 21.7* 19.9* 16.7*   RBC 3.00* 3.49* 3.42* 3.61*   HGB 8.7* 10.3* 10.0* 10.4*   HCT 26.7* 30.0* 30.0* 32.2*   MCV 89 86 88 89   MCH 29.0 29.5 29.2 28.8   MCHC 32.6 34.3 33.3 32.3   RDW 17.7* 17.4* 17.3* 17.1*    301 282 270     INR    Recent Labs  Lab 06/27/18  0629 06/26/18  0433 06/25/18  0509 06/24/18  0514   INR 1.10 1.07 1.07 1.07      Hepatic Panel     Recent Labs  Lab 06/23/18  2126   AST 13   ALT 26   ALKPHOS 136   BILITOTAL 0.6   ALBUMIN 3.1*     Glucose  Recent Labs  Lab 06/27/18  0806 06/27/18  0629 06/27/18  0313  06/26/18  2111 06/26/18  1822 06/26/18  1717 06/26/18  1243  06/26/18  0433  06/25/18  0509  06/24/18  0514  06/23/18  2126 06/23/18  1919   GLC  --  200*  --   --   --   --   --   --  268*  --  300*  --  298*  --  306* 352*   *  --  162* 278* 211* 228* 195*  < >  --   < >  --   < >  --   < >  --   --    < > = values in this interval not displayed.    Imaging:   Recent Results (from the past 24 hour(s))   CT Chest/Abdomen/Pelvis w Contrast    Narrative    EXAMINATION: CT CHEST/ABDOMEN/PELVIS W CONTRAST, 6/26/2018 10:34 AM    TECHNIQUE:  Helical CT images from the thoracic inlet through the  symphysis pubis were obtained  with IV and oral contrast.    CONTRAST DOSE: Isovue 370 104mL    COMPARISON: CT 6/14/2018, 1/12/2018, 1/8/2018, radiograph 6/25/2018,  6/20/2018, 6/18/2018, MRI abdomen 4/19/2018    HISTORY: fevers, leukocytosis, history of pneumoperitoneum, stable  over past week;     Status post orthotopic heart transplantation, placement no ventricular  epicardial pacing leads, resection of proximal ascending aortic  aneurysm with reinforcement of the distal aortic stump done on  6/15/2018    FINDINGS:    Chest:   Stable right-sided tunneled dialysis catheter with tip in the right  atrium. Unchanged left arm PICC with tip located in the mid/low SVC.    New postoperative changes of heart transplant and aortic root  reconstruction. Mild substernal mediastinal soft tissue stranding.  Small amount of poorly defined dense anterior mediastinal fluid and  approximately 1.7 x 1.3 cm (series 3 image 171). No pericardial  effusion. The pulmonary artery and thoracic aorta are within normal  limits. No main or lobar pulmonary embolus.  Central tracheobronchial  tree is patent. New right lung base atelectasis/consolidation. No  pleural effusion or pneumothorax.  Decreased size of the mediastinal  and perihilar lymph nodes, which are all subcentimeter now.    Abdomen and pelvis:  Large volume of pneumoperitoneum is  again seen. Unremarkable liver and  gallbladder. The spleen and adrenals are within normal limits.  Atrophic pancreas. Multiple pancreatic cystic lesions. The largest  being a 2 cm pancreatic head cyst (series 3 image 310), better  characterized on the MRI the 4/19/2018. Kidneys appear small for a  patient of this stature. Bilateral renal cysts. No hydronephrosis or  renal calculus. Unremarkable urinary bladder.  Small sliding hiatal  hernia. Unchanged circumferentially prominent pyloric wall. No bowel  obstruction or abnormal bowel wall thickening. Large prostate measures  up to 4.9 cm.  No significant ascites. No pneumatosis, portal venous  gas or free air. No abdominopelvic lymphadenopathy.    Bones and soft tissues:  No suspicious osseous findings. Mild multilevel degenerative changes  of thoracolumbar spine. Median sternotomy wires.      Impression    IMPRESSION:   1. New postoperative changes of cardiac transplantation and aortic  root reconstruction:  2. Left basilar opacities, likely atelectasis. Superimposed infection  is difficult to exclude on CT.  3. Small amount of poorly defined anterior mediastinal fluid, likely  postoperative blood products.  4. Large amount of pneumoperitoneum is again seen.  5. 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    I have personally reviewed the examination and initial interpretation  and I agree with the findings.    ROSELYN TAVAREZ MD         ASSESSMENT/PLAN: Patient is a 63 year old male with a history of CAD, ICM (EF of 15-20%), ESRD on HD, bicuspid aortic valve with AI, severe MR, and proximal AAA who was admitted for decompensated heart failure 4/16/18 now s/p heart transplant on 6/14/18. 6/20 biopsy showed 1R cellular rejection.    Neuro:  -Acute post-op pain: IV dilaudid, tylenol and oxycodone prn    CV:  - ASA 81 mg, pravastatin.  - Blood pressure  (goal SBP less than 130 given aortic aneurysm): borderline controlled, on hydralazine 100 mg qid, isordil to 10 mg q8h 6/26  - TPW have been removed  - RHC and biopsy 6/27      Resp: extubated POD 2  - IS, encourage activity  - All chest tubes have been removed, CXR with stable pneumoperitoneum (likely from chest tube removal). Has LLL opacity, stable, CT chest 6/26 unremarkable      FEN/GI:   - regular diet, hold bowel regimen, Multiple BM with trace blood on toilet paper, repeating abdominal film today. No abdominal distention or tenderness, general surgery following given pneumoperitonium that has been stable, CT abdomin without bowel inflammation    Renal:   - ESRD on dialsysis, on iHD, scheduled 6/26  - Volume status: dry to slightly hypervolemic, recommend pulling fluid with next HD run      ID: Completed daniella-op abx,   - WBC 19->21->14, afebrile, Is having some loose stools, c.diff negative 6/25, UA unremarkable, CXR with stable LLL opacity, will start vanc/cefipime (6/26), ID consult, recommended addition of IV flagyl. CT c/a/p with and without contrast unremarkable.  - Immunosuppression per cards    Heme:   - Hgb 10.3->8.7, ? Dilutional, recheck this PM. Had small amount of smeared blood on toilet paper this am. No melena or PRBPR      Endo:   - BG consistently above 200, on SSI started lantus 8U 6/25, consult endocrine      PPx:   - PPI      Anticoagulation:   - Apixaban for right IJ thrombus       Dispo:   - 6C since 6/17    Staff surgeons have been informed of changes through both verbal and written communication.       Marquis Villagomez PA-C  Cardiothoracic Surgery  June 27, 2018 8:18 AM   p: [phone]

## 2018-06-27 NOTE — PROGRESS NOTES
[facility]    Transplant Infectious Diseases Inpatient Progress note      Murray Nicholson MRN# [id number]   YOB: 1955 Age: 63 year old   Date of Admission: 4/16/2018  8:12 PM  Transplant: 6/14/2018 (Heart), Postoperative day: 13     This note was attested by Dr. Richards  ATTESTATION   I interviewed and examined the patient myself. I also reviewed the chart and discussed the case with Dr. Boyle.   I agree with the assessment and plan as written which reflects my overall impression and recommendations.       Mack Richards  6/27/2018   Pager: [phone]           Recommendations:   1. Continue with cefepime, flagyl and vancomycin regimen for now.  - the ABx will likely be short course given that leukocytosis is likely to be due to leukomoid reaction.   2. Will check for Rotavirus, Adenovirus and Norovirus to r/o enteritis as cause of diarrhea.         Summary of Presentation:   Transplants:  6/14/2018 (Heart), Postoperative day:  13     This patient is a 63 year old male with DM, ESRD, CHF s/p OHT with solumedrol induction therapy. Now on TAC/MMF/tapering prednisone for maintenance.      ID consulted for leukocytosis.        Active Problems and Infectious Diseases Issues:   1. Leukocytosis.   With diarrhea, however C diff is negative and WBC continues to downtrend while on cefepime, vancomycin and flagyl. We believe the elevated WBC is most likely a leukemoid reaction to either or a combination of recent OHTx, pneumoperitoneum, and increased steroid use. Although pt was afebrile throughout the hospital stay, the WBC continues to downtrend in response to the abx course given and thus an intraabdominal infection is a possible concern (albeit low). We suggest to continue with the current antibiotic regimen for now for a short course. Additionally, given presence of coagulase negative staph on dialysis cath specimen (06/14/18 collection date), we prefer that vancomycin is the last  abx to be discontinued.    2. Diarrhea  Although C diff testing is negative, pt continues to experience diarrhea. Of note, the onset of diarrhea began post-transplant, and thus we believe it is a side effect of mycophenolate. We will order PCR testing for rotavirus, adenovirus and norovirus to r/o viral etiology of enteritis as the cause of pt's diarrhea.         Old Problems and Infectious Diseases Issues:   1. Peritonitis related to PD catheter infection with Strep sp, E coli, P aeruginosa in 12/2017. And C glabrata and Bacteroides cacca in 1/2018 with negative CT abdomen and removal of PD catheter and conversion to HD.   2. At the time of OHT, Coag Neg Staph from a cx of the thrombus on the tip of the HD catheter which was retained.     Other Infectious Disease issues include:  - PCP prophylaxis: bactrim.   - Serostatus: CMV D+/R-, EBV D?/R+, HSV1-/2+, VZV +  - Immunization status: Too soon to discuss.   - Gamma globulin status: not recently checked.      Thank you very much for involving me in the care of Mr. Murray Nicholson. Please do not hesitate to call me for any question.     Attestation:  I interviewed the patient and obtained history from the patient, and by reviewing the patient's chart including outside records, microbiological data, and radiological data. All data are summarized in this notes.  Charlie Boyle   Pager: [phone]  06/27/2018    Interim History and Events:   Pt continues to experience mild, crampy diffuse abdominal pain this AM. Pt reports multiple episodes of watery stools this AM that have decreased in frequency throughout the day. He notes improvement of appetite. Denies fevers, chills, CP, SOB, N/V.    HPI:  This patient is a 63 year old male with DM, ESRD, CHF s/p OHT.   He started to develop leukocytosis with no localizing symptoms except diarrhea today with crampy abdominal pain that resolves after BM.   No N/V but has poor appetite.   It seems that the patient today is  hypotensive during HD.      The patient used to be on PD. In 12/2017 he was diagnosed with peritonitis due to E coli, P aeruginosa, and Strep sp. He was treated with intraperitoneal ceftazidime then needed and admission in 1/2018 for IV ABx. During that admission, peritoneal fluid grew C glabrata and Bacteroides cacca. CT abdomen and pelvis was unremarkable. PD catheter was removed.   The patient finished 2 weeks of IV ABx and 2 weeks of micafungin. (the C glabrata was susceptible to fluconazole with LILIAN of 4).       ROS:  As mentioned in the interim history otherwise negative by reviewing constitutional symptoms, central and peripheral neurological systems, respiratory system, cardiac system, GI system,  system, musculoskeletal, skin, allergy, and lymphatics.                 Pysical Examination:  Temp: 98.1  F (36.7  C) Temp src: Oral BP: 135/67   Heart Rate: 105 Resp: 16 SpO2: 99 % O2 Device: None (Room air)    Vitals:    06/23/18 1236 06/24/18 0500 06/25/18 0552 06/26/18 0630   Weight: 73.8 kg (162 lb 11.2 oz) 74.5 kg (164 lb 3.2 oz) 76.1 kg (167 lb 12.8 oz) 76.9 kg (169 lb 8 oz)    06/27/18 0540   Weight: 78 kg (172 lb)       Constitutional: awake, alert, cooperative, no apparent distress and appears at stated age, well nourished.     Neurologic: Patient is moving all extremities without focal deficit, no focal sensory loss.   Lungs: CTA bilaterally, no accessory muscle use.  CVS: RRR, normal S1/S2, no murmur, PMI was not displaced.   Abdomen: Mild ttp to umbilicus and RLQ. Normal BS; non-distended.   Extremities: no pitting edema of bilateral lower extremities, no ulcers, normal ROM of all joints, no swelling or erythema of any of joints and no tenderness to palpation.   Skin: no induration, fluctuation or discharge at the HD catheter in right hcest wall and the sternal wound, and no rash. Surgical incisions on lower abdomen appear well healing with no purulent drainage noted.    Medications:  Medications  that Require Transfusion:     IV fluid REPLACEMENT ONLY       IV fluid REPLACEMENT ONLY       dextrose 5% and 0.45% NaCl 10 mL/hr at 06/15/18 0900     Reason beta blocker order not selected         Scheduled Medications:     apixaban ANTICOAGULANT  2.5 mg Oral BID     aspirin  81 mg Oral Daily     ceFEPIme (MAXIPIME) IV  1 g Intravenous Q24H     hydrALAZINE  100 mg Oral Q6H     insulin aspart   Subcutaneous TID w/meals     insulin aspart  1-10 Units Subcutaneous TID AC     insulin aspart  1-7 Units Subcutaneous At Bedtime     insulin glargine  15 Units Subcutaneous Q24H     isosorbide dinitrate  10 mg Oral Q8H     metroNIDAZOLE  500 mg Intravenous Q8H     mycophenolate  1,500 mg Oral BID IS     NEPHROCAPS  1 capsule Oral Daily     nystatin  1,000,000 Units Swish & Swallow 4x Daily     pantoprazole  40 mg Oral QAM     pravastatin  40 mg Oral Daily     predniSONE  20 mg Oral BID     senna-docusate  1 tablet Oral BID     sodium chloride (PF)  3 mL Intracatheter Q8H     sulfamethoxazole-trimethoprim  1 half-tab Oral QPM     tacrolimus  3 mg Oral QPM     tacrolimus  3 mg Oral QAM     terbutaline  5 mg Oral or Feeding Tube Q8H     [START ON 6/28/2018] valGANciclovir  450 mg Oral Once per day on Mon Thu     vancomycin place bui - receiving intermittent dosing  1 each Does not apply See Admin Instructions       Laboratory Data:   No results found for: ACD4    Inflammatory Markers    Recent Labs   Lab Test  07/05/17   1204  05/31/17   1111  05/17/17   1214  04/13/17   0651  04/12/17   0935  04/11/17   1319  01/13/16   1337   SED   --    --    --    --    --    --   80*   CRP  35.4*  15.9*  39.3*  270.0*  200.0*  130.0*   --        Immune Globulin Studies     Recent Labs   Lab Test  05/19/15   1000   IGG  1380   IGM  108   IGA  203       Metabolic Studies       Recent Labs   Lab Test  06/27/18   0629  06/26/18   0433  06/25/18   0509  06/24/18   0514  06/24/18   0150   06/23/18   2126  06/23/18   1919  06/23/18   0533    06/19/18   0657  06/18/18   0827   04/26/18   0645   04/12/17   0935   NA  135  129*  129*  131*   --    --   130*  129*  130*   < >  128*  130*   < >   --    < >   --    POTASSIUM  4.3  5.7*  5.0  4.5   --    --   3.7  4.0  4.8   < >  5.1  5.0   < >   --    < >   --    CHLORIDE  99  92*  93*  93*   --    --   94  93*  94   < >  94  94   < >   --    < >   --    CO2  22  18*  19*  24   --    --   22   --   20   < >  18*  22   < >   --    < >   --    ANIONGAP  14  18*  18*  13   --    --   14   --   16*   < >  16*  14   < >   --    < >   --    BUN  51*  109*  82*  53*   --    --   42*   --   78*   < >  68*  52*   < >   --    < >   --    CR  4.95*  8.00*  6.50*  4.69*   --    --   4.01*   --   6.45*   < >  6.50*  5.01*   < >   --    < >   --    GFRESTIMATED  12*  7*  9*  13*   --    --   15*   --   9*   < >  9*  12*   < >   --    < >   --    GLC  200*  268*  300*  298*   --    --   306*  352*  305*   < >  170*  113*   < >   --    < >   --    A1C   --    --    --    --    --    --    --    --    --    --    --    --    --   7.0*   < >   --    TRINI  8.3*  9.2  8.8  9.0   --    --   9.0   --   9.3   < >  9.0  9.0   < >   --    < >   --    PHOS   --    --    --    --    --    --    --    --    --    --   5.2*  4.0   < >   --    < >   --    MAG   --    --    --    --    --    --    --    --    --    --   2.2  2.2   < >   --    < >   --    LACT  0.9   --    --    --   1.4   < >   --   2.0   --    --    --    --    < >   --    < >   --    CKT   --    --    --    --    --    --    --    --    --    --    --    --    --    --    --   70    < > = values in this interval not displayed.       Hepatic Studies    Recent Labs   Lab Test  06/25/18   0509  06/23/18   2126  06/18/18   0827  06/17/18   0433  06/16/18   0342  06/15/18   2200  06/15/18   1541   05/19/15   1000   BILITOTAL   --   0.6  0.8  1.1  2.0*  2.7*  2.5*   < >  0.4   ALKPHOS   --   136  114  105  85  83  83   < >  126   ALBUMIN   --   3.1*  3.3*  3.5  3.0*  3.0*  3.2*    < >  2.9*   AST   --   13  56*  92*  111*  107*  105*   < >  15   ALT   --   26  34  33  30  28  26   < >  20   LDH  271*   --    --    --    --    --    --    --   252*    < > = values in this interval not displayed.       Pancreatitis testing    Recent Labs   Lab Test  06/14/18   1207  04/16/18   1546  04/11/17   0722  11/29/16   1120  04/14/16   0958  08/10/15   0729  04/09/15   0900   AMYLASE  62   --    --    --    --    --    --    TRIG   --   233*  145  147  240*  421*  426*       Hematology Studies      Recent Labs   Lab Test  06/27/18   0629  06/26/18   0433  06/25/18   0509  06/24/18   0514  06/23/18   2126  06/23/18   0533   06/18/18   0827  06/17/18   0433   06/16/18   0342  06/15/18   2200   WBC  14.7*  21.7*  19.9*  16.7*  14.1*  14.3*   < >  14.7*  14.7*   < >  12.3*  11.2*   ANEU  13.8*   --    --    --   12.5*   --    --   13.4*  14.0*   --   11.6*  10.5*   ALYM  0.7*   --    --    --   1.0   --    --   1.1  0.6*   --   0.5*  0.5*   DK  0.2   --    --    --   0.5   --    --   0.2  0.2   --   0.2  0.2   AEOS  0.0   --    --    --   0.0   --    --   0.0  0.0   --   0.0  0.0   HGB  8.7*  10.3*  10.0*  10.4*  10.8*  10.7*   < >  9.5*  9.2*   < >  8.3*  8.2*   HCT  26.7*  30.0*  30.0*  32.2*  33.2*  31.8*   < >  29.7*  28.7*   < >  25.8*  25.5*   PLT  208  301  282  270  263  273   < >  143*  139*   < >  145*  145*    < > = values in this interval not displayed.       Arterial Blood Gas Testing    Recent Labs   Lab Test  06/16/18   0836  06/16/18   0828  06/16/18   0655  06/16/18   0334  06/15/18   2200  06/15/18   1956   PH   --   7.43  7.43  7.46*  7.47*  7.45   PCO2   --   38  38  35  34*  36   PO2   --   120*  171*  180*  169*  172*   HCO3   --   25  25  25  25  25   O2PER  4L  4L  40  40  40  40  40        Urine Studies     Recent Labs   Lab Test  06/25/18   1420  02/05/18   0935  02/04/18   2127  05/03/17   1344  04/08/17   1720   URINEPH  5.0  5.5  Canceled: Specimen improperly labeled.  Laboratory value report is not on this patient.  5.0  5.0   NITRITE  Negative  Negative  Canceled: Specimen improperly labeled. Laboratory value report is not on this patient.*  Negative  Negative   LEUKEST  Small*  Negative  Canceled: Specimen improperly labeled. Laboratory value report is not on this patient.*  Negative  Negative   WBCU  9*  4*  Canceled: Specimen improperly labeled. Laboratory value report is not on this patient.*  1  7*       Vancomycin Levels     Recent Labs   Lab Test  01/15/18   0815  01/13/18   1440  01/12/18   1402  01/12/18   0742  01/10/18   1015   VANCOMYCIN  22.5  28.8*  37.3*  32.2*  21.7       Tobramycin levels     No lab results found.    Gentamicin levels    Recent Labs   Lab Test  01/17/18   0900  01/15/18   0815  01/14/18   0749  01/13/18   1959   GENT  8.2  2.6  3.9  5.2       Tacrolimus levels    Invalid input(s): TACROLIMUS, TAC, TACR  Transplant Immunosuppression Labs Latest Ref Rng & Units 6/27/2018 6/26/2018 6/25/2018 6/24/2018 6/23/2018   Tacro Level 5.0 - 15.0 ug/L - 11.3 8.6 8.9 -   Tacro Level - - Not Provided Not Provided Not Provided -   Creat 0.66 - 1.25 mg/dL 4.95(H) 8.00(H) 6.50(H) 4.69(H) 4.01(H)   BUN 7 - 30 mg/dL 51(H) 109(H) 82(H) 53(H) 42(H)   WBC 4.0 - 11.0 10e9/L 14.7(H) 21.7(H) 19.9(H) 16.7(H) 14.1(H)   Neutrophil % 93.5 - - - 88.6   ANEU 1.6 - 8.3 10e9/L 13.8(H) - - - 12.5(H)       Cyclosporine levels    Invalid input(s): CYCLOSPORINE, CYC    Mycophenolate levels    Invalid input(s): MYPA, MYP    Sirolimus levels    Invalid input(s): SIROLIMUS, SIR, RAPA    CSF testing   No lab results found.      Microbiology:      Last check of C difficile  C Diff Toxin B PCR   Date Value Ref Range Status   06/25/2018 Negative NEG^Negative Final     Comment:     Negative: Clostridium difficile target DNA sequences NOT detected, presumed   negative for Clostridium difficile toxin B or the number of bacteria present   may be below the limit of detection for the test.  FDA  approved assay performed using [employer] GeneXpert real-time PCR.  A negative result does not exclude actual disease due to Clostridium difficile   and may be due to improper collection, handling and storage of the specimen   or the number of organisms in the specimen is below the detection limit of the   assay.         Virology:             CMV Antibody IgG   Date Value Ref Range Status   06/14/2018 0.2 0.0 - 0.8 AI Final       Comment:       Negative  Antibody index (AI) values reflect qualitative changes in antibody   concentration that cannot be directly associated with clinical condition or   disease state.   04/16/2018 <0.2 0.0 - 0.8 AI Final       Comment:       Negative  Antibody index (AI) values reflect qualitative changes in antibody   concentration that cannot be directly associated with clinical condition or   disease state.   08/10/2015 0.2 0.0 - 0.8 AI Final       Comment:       Negative   Antibody index (AI) values reflect qualitative changes in antibody   concentration that cannot be directly associated with clinical condition or   disease state.                 Toxoplasma Antibody IgG   Date Value Ref Range Status   04/16/2018 <3.0 0.0 - 7.1 IU/mL Final       Comment:       Negative- Absence of detectable Toxoplasma gondii IgG antibodies. A negative   result does not rule out acute infection.  The magnitude of the measured result is not indicative of the amount of   antibody present. The concentrations of anti-Toxoplasma gondii IgG in a given   specimen determined with assays from different manufacturers can vary due to   differences in assay methods and reagent specificity.       No results found for: H6RES    No results found for: EBVDN, EBRES, EBVDN, EBVSP, EBVPC, EBVPCR    BK viral loads No lab results found.    Imaging:    Exam: XR ABDOMEN 2 VW, 6/27/2018 10:53 AM     Indication: cramping;      Comparison: CT dated 6/26/2018, radiographs dated 6/25/2000     Findings:   Upright and supine AP  views of the abdomen. Postsurgical changes of  the chest. Intact median sternotomy wires noted over the lower thorax.  Partially visualized central venous line with the tip projecting over  the right atrium. Additional lines presumed exterior to the patient.  Unchanged large volume pneumoperitoneum. Gas pattern in the abdomen is  nonobstructive. No definite pneumatosis or portal venous gas. Multiple  phleboliths noted projecting over the pelvis. Unchanged left basilar  airspace opacities. Degenerative changes of the thoracolumbar spine  and hips. No acute osseous pathology.          Impression:   1. Redemonstration of large volume pneumoperitoneum without  significant interval change.  2. Nonobstructive bowel gas pattern.  3. Left basilar airspace opacities again noted, which may represent  atelectasis versus aspiration or infection.  4. Postsurgical changes and support devices as detailed above.    ECHO:  No echo performed on 06/27/2018.    Charlie Boyle  Pager: [phone]

## 2018-06-27 NOTE — PROGRESS NOTES
"      Advanced Heart Failure and Transplant Cardiology  Consult Note    Assessment and Plan:  63 year old male with a PMH of CAD, ICM (EF of 15-20%), ESRD, bicuspid aortic valve with AI, severe MR, and proximal AAA who was admitted mid-April for decompensated heart failure. He underwent OHT on 6/15/2018.        # Immunosuppression: Mycophenolate 1500 mg BID, prednisone 20 mg BID, Tacrolimus 3 mg / 3.5mg   - goal Tacro 8 - 10 ug/L for now until infectious concerns clear, (then back to 10 - 15 ug/L)    # Prophylaxis: CMV (- / +), EBV (+ / pending): Nystatin, Valcyte, bactrim, asa 81 mg daily, Pravastatin 40 mg daily, next biopsy Wednesday 6/27    WBC improved, as are symptoms. ID following and appreciate their help. RHC completed today and with normal biventricular filling pressures, EMB's pending.    Recommendations:  - resume Apixaban tonight  - we will follow EMB results  - continue IS and prophylaxis as ordered     Seen and discussed with Dr. Minaya.  Sukumar Mejía, CVD Fellow  ================================================================    Interval History   - no acute events  - continues to have diarrhea  - abdominal pain improved    Physical Exam   Temp: 98.1  F (36.7  C) Temp src: Oral BP: 135/67   Heart Rate: 105 Resp: 16 SpO2: 99 % O2 Device: None (Room air)    Vitals:    06/25/18 0552 06/26/18 0630 06/27/18 0540   Weight: 76.1 kg (167 lb 12.8 oz) 76.9 kg (169 lb 8 oz) 78 kg (172 lb)       Heart Rate: 105, Blood pressure 135/67, pulse 105, temperature 98.1  F (36.7  C), temperature source Oral, resp. rate 16, height 1.727 m (5' 8\"), weight 78 kg (172 lb), SpO2 99 %.  172 lbs 0 oz  GEN:  NAD, eating breakfast   CV: tachycardic, no murmur, no S3 or S4  LUNGS:  Lungs with decreased breath sounds  ABD:  Active bowel sounds, soft, non-tender/non-distended.  No rebound/guarding/rigidity.  EXT:  No edema or cyanosis.      Medications:  - reviewed in Epic    Data:  - all labs and imaging reviewed            "

## 2018-06-27 NOTE — PLAN OF CARE
Problem: Cardiac: Heart Transplant (Adult)  Goal: Signs and Symptoms of Listed Potential Problems Will be Absent, Minimized or Managed (Cardiac: Heart Transplant)  Signs and symptoms of listed potential problems will be absent, minimized or managed by discharge/transition of care (reference Cardiac: Heart Transplant (Adult) CPG).   Outcome: No Brianna    oops

## 2018-06-28 LAB
ANION GAP SERPL CALCULATED.3IONS-SCNC: 18 MMOL/L (ref 3–14)
APTT PPP: 29 SEC (ref 22–37)
BUN SERPL-MCNC: 64 MG/DL (ref 7–30)
CALCIUM SERPL-MCNC: 8.3 MG/DL (ref 8.5–10.1)
CHLORIDE SERPL-SCNC: 98 MMOL/L (ref 94–109)
CO2 SERPL-SCNC: 17 MMOL/L (ref 20–32)
COPATH REPORT: NORMAL
CREAT SERPL-MCNC: 6.98 MG/DL (ref 0.66–1.25)
ERYTHROCYTE [DISTWIDTH] IN BLOOD BY AUTOMATED COUNT: 18 % (ref 10–15)
GFR SERPL CREATININE-BSD FRML MDRD: 8 ML/MIN/1.7M2
GLUCOSE BLDC GLUCOMTR-MCNC: 130 MG/DL (ref 70–99)
GLUCOSE BLDC GLUCOMTR-MCNC: 168 MG/DL (ref 70–99)
GLUCOSE BLDC GLUCOMTR-MCNC: 293 MG/DL (ref 70–99)
GLUCOSE BLDC GLUCOMTR-MCNC: 294 MG/DL (ref 70–99)
GLUCOSE BLDC GLUCOMTR-MCNC: 296 MG/DL (ref 70–99)
GLUCOSE BLDC GLUCOMTR-MCNC: 323 MG/DL (ref 70–99)
GLUCOSE SERPL-MCNC: 290 MG/DL (ref 70–99)
HCT VFR BLD AUTO: 27.5 % (ref 40–53)
HGB BLD-MCNC: 9.1 G/DL (ref 13.3–17.7)
INR PPP: 1.15 (ref 0.86–1.14)
LACTATE BLD-SCNC: 1.3 MMOL/L (ref 0.4–1.9)
MCH RBC QN AUTO: 29.1 PG (ref 26.5–33)
MCHC RBC AUTO-ENTMCNC: 33.1 G/DL (ref 31.5–36.5)
MCV RBC AUTO: 88 FL (ref 78–100)
PLATELET # BLD AUTO: 256 10E9/L (ref 150–450)
POTASSIUM SERPL-SCNC: 4.6 MMOL/L (ref 3.4–5.3)
RBC # BLD AUTO: 3.13 10E12/L (ref 4.4–5.9)
SODIUM SERPL-SCNC: 133 MMOL/L (ref 133–144)
TACROLIMUS BLD-MCNC: 12.4 UG/L (ref 5–15)
TME LAST DOSE: NORMAL H
VANCOMYCIN SERPL-MCNC: 22.8 MG/L
WBC # BLD AUTO: 13.8 10E9/L (ref 4–11)

## 2018-06-28 PROCEDURE — 80202 ASSAY OF VANCOMYCIN: CPT | Performed by: THORACIC SURGERY (CARDIOTHORACIC VASCULAR SURGERY)

## 2018-06-28 PROCEDURE — 25000131 ZZH RX MED GY IP 250 OP 636 PS 637: Performed by: INTERNAL MEDICINE

## 2018-06-28 PROCEDURE — 25000132 ZZH RX MED GY IP 250 OP 250 PS 637: Performed by: THORACIC SURGERY (CARDIOTHORACIC VASCULAR SURGERY)

## 2018-06-28 PROCEDURE — 90937 HEMODIALYSIS REPEATED EVAL: CPT

## 2018-06-28 PROCEDURE — 93010 ELECTROCARDIOGRAM REPORT: CPT | Performed by: INTERNAL MEDICINE

## 2018-06-28 PROCEDURE — 83605 ASSAY OF LACTIC ACID: CPT | Performed by: THORACIC SURGERY (CARDIOTHORACIC VASCULAR SURGERY)

## 2018-06-28 PROCEDURE — 25000131 ZZH RX MED GY IP 250 OP 636 PS 637: Performed by: STUDENT IN AN ORGANIZED HEALTH CARE EDUCATION/TRAINING PROGRAM

## 2018-06-28 PROCEDURE — 99024 POSTOP FOLLOW-UP VISIT: CPT | Performed by: INTERNAL MEDICINE

## 2018-06-28 PROCEDURE — 93005 ELECTROCARDIOGRAM TRACING: CPT

## 2018-06-28 PROCEDURE — 87798 DETECT AGENT NOS DNA AMP: CPT | Performed by: INTERNAL MEDICINE

## 2018-06-28 PROCEDURE — 21400006 ZZH R&B CCU INTERMEDIATE UMMC

## 2018-06-28 PROCEDURE — 25000128 H RX IP 250 OP 636: Performed by: PHYSICIAN ASSISTANT

## 2018-06-28 PROCEDURE — 85730 THROMBOPLASTIN TIME PARTIAL: CPT | Performed by: THORACIC SURGERY (CARDIOTHORACIC VASCULAR SURGERY)

## 2018-06-28 PROCEDURE — 25000125 ZZHC RX 250: Performed by: PHYSICIAN ASSISTANT

## 2018-06-28 PROCEDURE — 80197 ASSAY OF TACROLIMUS: CPT | Performed by: STUDENT IN AN ORGANIZED HEALTH CARE EDUCATION/TRAINING PROGRAM

## 2018-06-28 PROCEDURE — 25000132 ZZH RX MED GY IP 250 OP 250 PS 637: Performed by: STUDENT IN AN ORGANIZED HEALTH CARE EDUCATION/TRAINING PROGRAM

## 2018-06-28 PROCEDURE — 25000132 ZZH RX MED GY IP 250 OP 250 PS 637: Performed by: PHYSICIAN ASSISTANT

## 2018-06-28 PROCEDURE — 36415 COLL VENOUS BLD VENIPUNCTURE: CPT | Performed by: THORACIC SURGERY (CARDIOTHORACIC VASCULAR SURGERY)

## 2018-06-28 PROCEDURE — 25000125 ZZHC RX 250: Performed by: STUDENT IN AN ORGANIZED HEALTH CARE EDUCATION/TRAINING PROGRAM

## 2018-06-28 PROCEDURE — 25000132 ZZH RX MED GY IP 250 OP 250 PS 637

## 2018-06-28 PROCEDURE — 25000125 ZZHC RX 250: Performed by: INTERNAL MEDICINE

## 2018-06-28 PROCEDURE — 87449 NOS EACH ORGANISM AG IA: CPT | Performed by: INTERNAL MEDICINE

## 2018-06-28 PROCEDURE — 36592 COLLECT BLOOD FROM PICC: CPT | Performed by: THORACIC SURGERY (CARDIOTHORACIC VASCULAR SURGERY)

## 2018-06-28 PROCEDURE — 85027 COMPLETE CBC AUTOMATED: CPT | Performed by: THORACIC SURGERY (CARDIOTHORACIC VASCULAR SURGERY)

## 2018-06-28 PROCEDURE — 80048 BASIC METABOLIC PNL TOTAL CA: CPT | Performed by: THORACIC SURGERY (CARDIOTHORACIC VASCULAR SURGERY)

## 2018-06-28 PROCEDURE — 25000128 H RX IP 250 OP 636: Performed by: CLINICAL NURSE SPECIALIST

## 2018-06-28 PROCEDURE — 00000146 ZZHCL STATISTIC GLUCOSE BY METER IP

## 2018-06-28 PROCEDURE — 25000128 H RX IP 250 OP 636

## 2018-06-28 PROCEDURE — 85610 PROTHROMBIN TIME: CPT | Performed by: THORACIC SURGERY (CARDIOTHORACIC VASCULAR SURGERY)

## 2018-06-28 RX ORDER — MIDODRINE HYDROCHLORIDE 5 MG/1
10 TABLET ORAL
Status: COMPLETED | OUTPATIENT
Start: 2018-06-28 | End: 2018-06-28

## 2018-06-28 RX ORDER — TACROLIMUS 1 MG/1
3 CAPSULE ORAL
Status: DISCONTINUED | OUTPATIENT
Start: 2018-06-29 | End: 2018-07-02 | Stop reason: HOSPADM

## 2018-06-28 RX ORDER — PREDNISONE 20 MG/1
20 TABLET ORAL EVERY MORNING
Status: DISCONTINUED | OUTPATIENT
Start: 2018-06-29 | End: 2018-07-02 | Stop reason: HOSPADM

## 2018-06-28 RX ADMIN — SODIUM CHLORIDE 300 ML: 9 INJECTION, SOLUTION INTRAVENOUS at 09:34

## 2018-06-28 RX ADMIN — METRONIDAZOLE 500 MG: 500 INJECTION, SOLUTION INTRAVENOUS at 17:38

## 2018-06-28 RX ADMIN — VANCOMYCIN HYDROCHLORIDE 750 MG: 1 INJECTION, POWDER, LYOPHILIZED, FOR SOLUTION INTRAVENOUS at 18:44

## 2018-06-28 RX ADMIN — TERBUTALINE SULFATE 5 MG: 5 TABLET ORAL at 06:18

## 2018-06-28 RX ADMIN — PREDNISONE 20 MG: 20 TABLET ORAL at 08:14

## 2018-06-28 RX ADMIN — ACETAMINOPHEN 650 MG: 325 TABLET, FILM COATED ORAL at 14:40

## 2018-06-28 RX ADMIN — TACROLIMUS 2.5 MG: 1 CAPSULE ORAL at 17:35

## 2018-06-28 RX ADMIN — VALGANCICLOVIR 450 MG: 450 TABLET, FILM COATED ORAL at 08:32

## 2018-06-28 RX ADMIN — Medication 1 CAPSULE: at 20:18

## 2018-06-28 RX ADMIN — TERBUTALINE SULFATE 5 MG: 5 TABLET ORAL at 21:12

## 2018-06-28 RX ADMIN — TERBUTALINE SULFATE 5 MG: 5 TABLET ORAL at 13:46

## 2018-06-28 RX ADMIN — MIDODRINE HYDROCHLORIDE 10 MG: 5 TABLET ORAL at 10:24

## 2018-06-28 RX ADMIN — NYSTATIN 1000000 UNITS: 100000 SUSPENSION ORAL at 21:12

## 2018-06-28 RX ADMIN — HYDRALAZINE HYDROCHLORIDE 100 MG: 100 TABLET ORAL at 06:18

## 2018-06-28 RX ADMIN — HYDRALAZINE HYDROCHLORIDE 100 MG: 100 TABLET ORAL at 00:34

## 2018-06-28 RX ADMIN — ISOSORBIDE DINITRATE 10 MG: 10 TABLET ORAL at 06:18

## 2018-06-28 RX ADMIN — Medication 12.5 MG: at 20:17

## 2018-06-28 RX ADMIN — PREDNISONE 15 MG: 5 TABLET ORAL at 20:17

## 2018-06-28 RX ADMIN — NYSTATIN 1000000 UNITS: 100000 SUSPENSION ORAL at 08:15

## 2018-06-28 RX ADMIN — METRONIDAZOLE 500 MG: 500 INJECTION, SOLUTION INTRAVENOUS at 00:34

## 2018-06-28 RX ADMIN — METRONIDAZOLE 500 MG: 500 INJECTION, SOLUTION INTRAVENOUS at 08:22

## 2018-06-28 RX ADMIN — TRAMADOL HYDROCHLORIDE 50 MG: 50 TABLET, COATED ORAL at 02:40

## 2018-06-28 RX ADMIN — SODIUM CHLORIDE 250 ML: 9 INJECTION, SOLUTION INTRAVENOUS at 09:35

## 2018-06-28 RX ADMIN — CEFEPIME HYDROCHLORIDE 1 G: 1 INJECTION, POWDER, FOR SOLUTION INTRAMUSCULAR; INTRAVENOUS at 17:29

## 2018-06-28 RX ADMIN — Medication 1 HALF-TAB: at 21:00

## 2018-06-28 RX ADMIN — ISOSORBIDE DINITRATE 10 MG: 10 TABLET ORAL at 13:46

## 2018-06-28 RX ADMIN — ASPIRIN 81 MG: 81 TABLET, COATED ORAL at 08:14

## 2018-06-28 RX ADMIN — MYCOPHENOLATE MOFETIL 1500 MG: 250 CAPSULE ORAL at 08:14

## 2018-06-28 RX ADMIN — TACROLIMUS 3 MG: 1 CAPSULE ORAL at 08:14

## 2018-06-28 RX ADMIN — HYDRALAZINE HYDROCHLORIDE 100 MG: 100 TABLET ORAL at 13:46

## 2018-06-28 RX ADMIN — PANTOPRAZOLE SODIUM 40 MG: 40 TABLET, DELAYED RELEASE ORAL at 08:14

## 2018-06-28 RX ADMIN — APIXABAN 2.5 MG: 2.5 TABLET, FILM COATED ORAL at 20:17

## 2018-06-28 RX ADMIN — Medication 1 CAPSULE: at 14:02

## 2018-06-28 RX ADMIN — PRAVASTATIN SODIUM 40 MG: 40 TABLET ORAL at 20:18

## 2018-06-28 RX ADMIN — MYCOPHENOLATE MOFETIL 1500 MG: 250 CAPSULE ORAL at 17:35

## 2018-06-28 RX ADMIN — NYSTATIN 1000000 UNITS: 100000 SUSPENSION ORAL at 17:35

## 2018-06-28 RX ADMIN — APIXABAN 2.5 MG: 2.5 TABLET, FILM COATED ORAL at 08:14

## 2018-06-28 RX ADMIN — Medication: at 09:35

## 2018-06-28 RX ADMIN — NYSTATIN 1000000 UNITS: 100000 SUSPENSION ORAL at 13:46

## 2018-06-28 ASSESSMENT — PAIN DESCRIPTION - DESCRIPTORS
DESCRIPTORS: ACHING
DESCRIPTORS: HEADACHE

## 2018-06-28 NOTE — PLAN OF CARE
Problem: Cardiac: Heart Transplant (Adult)  Goal: Signs and Symptoms of Listed Potential Problems Will be Absent, Minimized or Managed (Cardiac: Heart Transplant)  Signs and symptoms of listed potential problems will be absent, minimized or managed by discharge/transition of care (reference Cardiac: Heart Transplant (Adult) CPG).   Outcome: No Change  D/He continues on ATB and dialysis. Not pleased with supper and so ate small amounts. One episode of small amount of diarrhea tonight. BP is improved tonight  P/monitor for changes

## 2018-06-28 NOTE — PLAN OF CARE
Problem: Patient Care Overview  Goal: Plan of Care/Patient Progress Review  RN:  1.Pt will remain hemodynamically stable.  2.BG WNL.   OT/6C - Pt OOR for dialysis majority of morning and unavailable for therapy. Will check back and/or reschedule as appropriate and as schedule allows.

## 2018-06-28 NOTE — PROGRESS NOTES
"SPIRITUAL HEALTH SERVICES  SPIRITUAL ASSESSMENT Progress Note  [facility] ([place]) 6C     Pt \"not feeling all that great\" but still welcomed a visit today. Pt feels overall his progress has been good, and he feels he has good support for when he eventually gets home. Talked about pt's plans for connecting with friends, and reminisced about pt's experience in theater.     PLAN: continue to follow, will visit again early next week. If pt still on unit.    Navid Pearl) Pia Alonso M.Div., [facility]  Staff   Pager [phone]      "

## 2018-06-28 NOTE — PROGRESS NOTES
CARDIOTHORACIC SURGERY PROGRESS NOTE  06/28/2018      SUBJECTIVE:    No acute issues over night.   States that pain is being controlled. Complains of some soreness in his chest. Breathing is good. Working with IS.    Breakfast went well.     Complains that it was really hot in his room last night. Due to this he did not sleep well last night. Looking forward to some rest during dialysis.  +BM this am (states barely made it to the bathroom) Continues to have diarrhea. No nausea.     Patient denies SOB, CP.     Continues to ambulate. Denies any popping/clicking in his breast bone.    OBJECTIVE:   Temp:  [97.7  F (36.5  C)-98.6  F (37  C)] 97.9  F (36.6  C)  Heart Rate:  [101-133] 101  Resp:  [16-18] 18  BP: ()/(57-76) 102/66  Cuff Mean (mmHg):  [75-83] 75  SpO2:  [98 %-100 %] 100 %    Gen: lying in bed, comfortable, +O2, dialysis  CT removed  CV: RRR, telemetry ST 100s, -edema LE   Pulm: CTA anteriorly  Abd: BS+, NT/ND, soft  Incision: sternal incision D/I    Labs:   BMP    Recent Labs  Lab 06/28/18  0554 06/27/18  0629 06/26/18  0433 06/25/18  0509    135 129* 129*   POTASSIUM 4.6 4.3 5.7* 5.0   CHLORIDE 98 99 92* 93*   TRINI 8.3* 8.3* 9.2 8.8   CO2 17* 22 18* 19*   BUN 64* 51* 109* 82*   CR 6.98* 4.95* 8.00* 6.50*   * 200* 268* 300*     CBC    Recent Labs  Lab 06/28/18  0554 06/27/18  1529 06/27/18  0629 06/26/18  0433 06/25/18  0509   WBC 13.8*  --  14.7* 21.7* 19.9*   RBC 3.13*  --  3.00* 3.49* 3.42*   HGB 9.1* 9.4* 8.7* 10.3* 10.0*   HCT 27.5*  --  26.7* 30.0* 30.0*   MCV 88  --  89 86 88   MCH 29.1  --  29.0 29.5 29.2   MCHC 33.1  --  32.6 34.3 33.3   RDW 18.0*  --  17.7* 17.4* 17.3*     --  208 301 282     INR    Recent Labs  Lab 06/28/18  0554 06/27/18  0629 06/26/18  0433 06/25/18  0509   INR 1.15* 1.10 1.07 1.07      Hepatic Panel     Recent Labs  Lab 06/23/18  2126   AST 13   ALT 26   ALKPHOS 136   BILITOTAL 0.6   ALBUMIN 3.1*     Glucose  Recent Labs  Lab 06/28/18  0724  06/28/18  0554 06/27/18  2208 06/27/18  1913 06/27/18  1222 06/27/18  0806 06/27/18  0629 06/27/18  0313  06/26/18  0433  06/25/18  0509  06/24/18  0514  06/23/18  2126   GLC  --  290*  --   --   --   --  200*  --   --  268*  --  300*  --  298*  --  306*   *  --  293* 226* 180* 193*  --  162*  < >  --   < >  --   < >  --   < >  --    < > = values in this interval not displayed.    Imaging:   Recent Results (from the past 24 hour(s))   XR Abdomen 2 Views    Narrative    Exam: XR ABDOMEN 2 VW, 6/27/2018 10:53 AM    Indication: cramping;     Comparison: CT dated 6/26/2018, radiographs dated 6/25/2000    Findings:   Upright and supine AP views of the abdomen. Postsurgical changes of  the chest. Intact median sternotomy wires noted over the lower thorax.  Partially visualized central venous line with the tip projecting over  the right atrium. Additional lines presumed exterior to the patient.  Unchanged large volume pneumoperitoneum. Gas pattern in the abdomen is  nonobstructive. No definite pneumatosis or portal venous gas. Multiple  phleboliths noted projecting over the pelvis. Unchanged left basilar  airspace opacities. Degenerative changes of the thoracolumbar spine  and hips. No acute osseous pathology.       Impression    Impression:   1. Redemonstration of large volume pneumoperitoneum without  significant interval change.  2. Nonobstructive bowel gas pattern.  3. Left basilar airspace opacities again noted, which may represent  atelectasis versus aspiration or infection.  4. Postsurgical changes and support devices as detailed above.    I have personally reviewed the examination and initial interpretation  and I agree with the findings.    MILADYS PENNY MD         ASSESSMENT/PLAN: Patient is a 63 year old male with a history of CAD, ICM (EF of 15-20%), ESRD on HD, bicuspid aortic valve with AI, severe MR, and proximal AAA who was admitted for decompensated heart failure 4/16/18 now s/p heart transplant  on 6/14/18. 6/20 biopsy showed 1R cellular rejection.    Neuro:  -Acute post-op pain: tylenol and ultram prn    CV:  - ASA 81 mg, pravastatin.  - Blood pressure (goal SBP less than 130 given aortic aneurysm): borderline controlled, on hydralazine 100 mg qid, isordil to 10 mg q8h 6/26  - TPW have been removed  - RHC and biopsy 6/27      Resp: extubated POD 2  - IS, encourage activity  - All chest tubes have been removed, CXR with stable pneumoperitoneum (likely from chest tube removal). Has LLL opacity, stable, CT chest 6/26 unremarkable      FEN/GI:   - regular diet, hold bowel regimen, Multiple BM with trace blood on toilet paper, repeating abdominal film today. No abdominal distention or tenderness, general surgery following given pneumoperitonium that has been stable, CT abdomin without bowel inflammation    Renal:   - ESRD on dialsysis, on iHD, scheduled 6/26  - Volume status: dry to slightly hypervolemic, pulling fluid with HD run      ID: Completed daniella-op abx,   - WBC trending down, afebrile, Is having some loose stools, c.diff negative 6/25, UA unremarkable, CXR with stable LLL opacity, will start vanc/cefipime (6/26), ID consult, recommended addition of IV flagyl. CT c/a/p with and without contrast unremarkable.  - Immunosuppression per cards    Heme:   - Hgb 9.1, stable. Had small amount of smeared blood on toilet paper 6/27.       Endo:   - BG consistently above 200, on SSI started lantus 8U 6/25, consult endocrine      PPx:   - PPI      Anticoagulation:   - Apixaban for right IJ thrombus   - stop INR checks      Dispo:   - 6C since 6/17    Encouraged IS/TCDB/amb. Sternal precautions. Continue to monitor.   Staff surgeons have been informed of changes through both verbal and written communication.       Silviano Baker PA-C  [phone]

## 2018-06-28 NOTE — PROGRESS NOTES
Diabetes Consult Daily  Progress Note          Assessment/Plan:    Murray Nicholson is a 63 year old male with history of type 2 diabetes, hypertension, hyperlipidemia, ESRD ( HD, TTS), ICM ( EF 10 to 15%) due to valvular hear disease who was admitted for decompensated heart failure (4/16/2018) now s/p orthotopic  heart transplant (6/14/2018).        Type 2 diabetes: PTA glipizide 5 mg   Prednisone 20 mg bid    BG.  Manny to high 200s overnight after being n.p.o. , but states only small amount of intake at supper.  white blood count elevated, C.Diff related diarrhea continues creatinine 6.98 GFR 8. Currently in dialysis. will continue to monitor BG today to determine need for increase in Lantus tomorrow morning    Plan  -  glargine  15 units every 24 hours ( 0800)  -continue Prandial insulin Novolog  1 unit per 10 grams  of CHO for meals and snacks  Continue Aspart high intensity sliding scale before meals and HS  -monitor glucose before meals HS and 0200  -Recommend starting IV insulin if glucose > 350 x 2     Will continue to follow                        Outpatient diabetes follow up: TBD, possible discharge on Monday  Plan discussed with patient           Interval History:   The last 24 hours progress and nursing notes reviewed.    Blood sugars improved 06/26 with increase of lantus to 15 units and increased prandial and increased correction intensity . 06/27 was NPO for most of day for heart cath  BG manny overnight to 294 with little PO intake   In HD this AM, will recheck BG status after dialysis run    appetite is reported as good, denies nausea and or vomiting. still some diarrhea ,Not as frequent (C. Diff)    heart Cath results 06/27 pending, WBC elevated today  HD scheduled for T, Th , Sat  Possible Discharge on Monday      Steroid:  Prednisone 20 mg twice daily      Recent Labs  Lab 06/28/18  0724 06/28/18  0554 06/27/18  2208 06/27/18  1913 06/27/18  1222 06/27/18  0806 06/27/18  0629  "06/27/18  0313  06/26/18  0433  06/25/18  0509  06/24/18  0514  06/23/18 2126   GLC  --  290*  --   --   --   --  200*  --   --  268*  --  300*  --  298*  --  306*   *  --  293* 226* 180* 193*  --  162*  < >  --   < >  --   < >  --   < >  --    < > = values in this interval not displayed.            Review of Systems:   See interval hx          Medications:       Active Diet Order      Regular Diet Adult Thin Liquids (water, ice chips, juice, milk, gelatin, ice cream, etc)     Physical Exam:  Gen: Alert, resting in bed, in NAD   HEENT: mucous membranes are moist  Resp: non-labored  Ext: moving all extemiteis  Neuro:oriented x3, communicating clearly  /60  Pulse 105  Temp 97.9  F (36.6  C) (Oral)  Resp 18  Ht 1.727 m (5' 8\")  Wt 78.2 kg (172 lb 6.4 oz)  SpO2 100%  BMI 26.21 kg/m2           Data:     Lab Results   Component Value Date    A1C 7.0 04/26/2018    A1C 6.3 04/04/2018    A1C 8.6 02/02/2018    A1C 9.1 01/08/2018    A1C 6.4 06/27/2017              CBC RESULTS:   Recent Labs   Lab Test  06/26/18 0433   WBC  21.7*   RBC  3.49*   HGB  10.3*   HCT  30.0*   MCV  86   MCH  29.5   MCHC  34.3   RDW  17.4*   PLT  301     Recent Labs   Lab Test  06/26/18   0433  06/25/18   0509   NA  129*  129*   POTASSIUM  5.7*  5.0   CHLORIDE  92*  93*   CO2  18*  19*   ANIONGAP  18*  18*   GLC  268*  300*   BUN  109*  82*   CR  8.00*  6.50*   TRINI  9.2  8.8     Liver Function Studies -   Recent Labs   Lab Test  06/23/18 2126   PROTTOTAL  7.0   ALBUMIN  3.1*   BILITOTAL  0.6   ALKPHOS  136   AST  13   ALT  26     Lab Results   Component Value Date    INR 1.07 06/26/2018    INR 1.07 06/25/2018    INR 1.07 06/24/2018    INR 1.07 06/23/2018    INR 1.09 06/22/2018    INR 1.04 06/21/2018    INR 1.04 06/20/2018    INR 1.01 06/19/2018    INR 1.02 06/18/2018    INR 1.03 06/17/2018    INR 1.16 06/16/2018    INR 1.42 06/15/2018   I spent a total of 35 minutes bedside and on the inpatient unit managing the glycemic care " of Murray Nicholson. Over 50% of my time on the unit was spent counseling the patient  and/or coordinating care regarding glycemic control.    See note for details.    Missy Retana -[phone]  Diabetes Management job code [phone]

## 2018-06-28 NOTE — PHARMACY-VANCOMYCIN DOSING SERVICE
Pharmacy Vancomycin Note  Date of Service 2018  Patient's  1955   63 year old, male    Indication: Possible post-op infection  Goal Trough Level: 15-20 mg/L  Day of Therapy: 3  Current Vancomycin regimen:  Vancomycin dosed intermittently based on levels. First and only dose: 1500mg IV once at 1458 on .    Current estimated CrCl = Estimated Creatinine Clearance: 12 mL/min (based on Cr of 6.98). Patient receives intermittent hemodialysis.     Creatinine for last 3 days  2018:  4:33 AM Creatinine 8.00 mg/dL  2018:  6:29 AM Creatinine 4.95 mg/dL  2018:  5:54 AM Creatinine 6.98 mg/dL    Recent Vancomycin Levels (past 3 days)  2018:  5:54 AM Vancomycin Level 22.8 mg/L    Vancomycin IV Administrations (past 72 hours)                   vancomycin (VANCOCIN) 1,500 mg in sodium chloride 0.9 % 250 mL intermittent infusion (mg) 1,500 mg New Bag 18 1458                Nephrotoxins and other renal medications (Future)    Start     Dose/Rate Route Frequency Ordered Stop    18 0800  tacrolimus (GENERIC EQUIVALENT) capsule 2.5 mg      2.5 mg Oral EVERY MORNING. 18 1316      18 1800  tacrolimus (GENERIC EQUIVALENT) capsule 2.5 mg      2.5 mg Oral EVERY EVENING 18 1316      18 1600  vancomycin (VANCOCIN) 750 mg in sodium chloride 0.9 % 250 mL intermittent infusion      750 mg  over 90 Minutes Intravenous ONCE 18 1343      18 0728  vancomycin place bui - receiving intermittent dosing      1 each Does not apply SEE ADMIN INSTRUCTIONS 18 0729               Contrast Orders - past 72 hours (72h ago through future)    Start     Dose/Rate Route Frequency Ordered Stop    18 1200  iopamidol (ISOVUE-370) solution 5 mL      5 mL Intravenous ONCE 18 1149 18 1200    18 1000  iopamidol (ISOVUE-370) solution 104 mL      104 mL Intravenous ONCE 18 0959 18 1015    18 0800  iohexol (OMNIPAQUE) solution 50 mL      50  mL Oral ONCE 06/26/18 0748 06/26/18 0808          Interpretation of levels and current regimen:  Spot check level is Supratherapeutic. However, patient received dialysis today, expect post-hemodialysis level to be 15-18 mg/L.     Has serum creatinine changed > 50% in last 72 hours: Patient receives intermittent hemodialysis.     Urine output: Averages 1 undocumented volume of urine per day.     Renal Function: ESRD on Dialysis    Plan:  1.  Redose vancomycin 750mg (10mg/kg) IV x1 after dialysis today.   2.  Pharmacy will check trough levels as appropriate prior to next dialysis run.      Teresa Reyes, PharmD IV Student        .

## 2018-06-28 NOTE — PLAN OF CARE
Problem: Cardiac: Heart Transplant (Adult)  Goal: Signs and Symptoms of Listed Potential Problems Will be Absent, Minimized or Managed (Cardiac: Heart Transplant)  Signs and symptoms of listed potential problems will be absent, minimized or managed by discharge/transition of care (reference Cardiac: Heart Transplant (Adult) CPG).   Outcome: No Change  D/continues on ATB, BP remains good, heart rate remains over 100 on terbutaline. Awakes easily and then back to sleep  I/middle of the night wanted Ultram-given

## 2018-06-28 NOTE — PROGRESS NOTES
HEMODIALYSIS TREATMENT NOTE    Date: 6/28/2018  Time: 12:49 PM    Data:  Pre Wt: 78.2 kg (172 lb 6.4 oz)   Desired Wt: 75.2 kg   Post Wt: 75.2 kg (162 lb 11.2 oz)  Weight gain: -3 kg   Weight change: 3 kg  Ultrafiltration - Post Run Net Total Removed (mL):3000 mL  Ultrafiltration - Post Run Net Total Gain (mL): 0 mL  Vascular Access Status: Yes, secured and visible  Dialyzer Rinse: Clear  Total Blood Volume Processed: 72  Total Dialysis (Treatment) Time:3.5hrs      Lab:   no    Interventions:Assessments  Pt dialyzed today for 3.5hrs on a K-2 Ca-3 bath via RCVC. 350 Qb throughout. 3kgs UF. VSS throughout. CVC dressing and Tegos changed today. See MAR for meds given. See Epic for further details. Report to primary RN on 6C.     Plan:    Per renal

## 2018-06-28 NOTE — PROGRESS NOTES
Advanced Heart Failure and Transplant Cardiology  Consult Note    Assessment and Plan:  63 year old male with a PMH of CAD, ICM (EF of 15-20%), ESRD, bicuspid aortic valve with AI, severe MR, and proximal AAA who was admitted mid-April for decompensated heart failure. He underwent OHT on 6/15/2018.        # Immunosuppression:   - Mycophenolate 1500 mg BID, prednisone 20 mg BID, Tacrolimus 3 mg BID  - goal Tacro 10-12 ug/L  - last biopsy: 6/27: 1R  - next biopsy Wednesday 7/4    # Prophylaxis: CMV (- / +), EBV (+ / pending):   - Nystatin, Valcyte, bactrim, asa 81 mg daily, Pravastatin 40 mg daily    WBC and symptoms continue to improve. He did flip into atrial fibrillation overnight, rates mostly 120-130's. He does not have symptoms nor was there a hemodynamic change otherwise. He is now in normal sinus rhythm. He remains on Apixaban. His biopsy is 1R and bedside TTE this morning with normal graft function and no effusion.    Recommendations:  - decrease Tacrolimus to 3.0 mg QAM / 2.5 mg QPM  - next Tacrolimus level Saturday AM  - reduce prednisone to 20 mg QAM / 15 mg QPM  - stop Isordil  - start metoprolol 12.5 mg BID  - continue IS and prophylaxis as ordered    All recommendations have been ordered. Please let us know of concerns.     Seen and discussed with Dr. Minaya.  Sukumar Mejía, CVD Fellow  ================================================================    Interval History   - no acute events  - flipped into atrial fibrillation overnight around 3 AM without symptoms nor HD compromise  - normal sinus rhythm this AM    Physical Exam   Temp: 97.7  F (36.5  C) Temp src: Oral BP: 108/68   Heart Rate: 122 Resp: 18 SpO2: 99 % O2 Device: None (Room air)    Vitals:    06/26/18 0630 06/27/18 0540 06/28/18 0624   Weight: 76.9 kg (169 lb 8 oz) 78 kg (172 lb) 78.2 kg (172 lb 6.4 oz)       Heart Rate: 122, Blood pressure 108/68, pulse 105, temperature 97.7  F (36.5  C), temperature source Oral, resp. rate 18, height  "1.727 m (5' 8\"), weight 78.2 kg (172 lb 6.4 oz), SpO2 99 %.  172 lbs 6.4 oz  GEN:  NAD  CV: tachycardic, no murmur, no S3 or S4  LUNGS:  Lungs with decreased breath sounds  ABD:  Active bowel sounds, soft, non-tender/non-distended.  No rebound/guarding/rigidity.  EXT:  No edema or cyanosis.      Medications:  - reviewed in [facility]    Data:  - all labs and imaging reviewed            "

## 2018-06-28 NOTE — PROGRESS NOTES
"Nephrology Progress Note  06/28/2018         Murray Nicholson is a 63 year old year old male with PMH of HTN, DMII, functionally bicuspid aortic valve, CHF/CM (EF 10-15%), ESRD, admitted with worsening dyspnea/SOB, now S/P heart tx 6/14 and started on CRRT, Nephrology managing HD/CRRT.      Interval History  Mr Nicholson is recovering from heart tx 6/14, had been considered for discharge but WBC was up to 21k earlier this week but now on downtrend, no fevers or chills overnight.  Running HD, pulling 2-3L which would put him at about EDW.  Likely discharge soon.        Assessment & Recommendations:   ESRD: due to DM, on PD since Aug 2017, changed to HD 1/18, now TTS, at [facility] under care Dr Mcpherson, RIJ tunnel, EDW 75.5-76 kg prior to tx, 3.5 hrs.                          -HD on schedule today, 2-3L UF.                         -Line is tunneled [facility]        Volume-EDW 76kg as outpt, 78.2 today, pulling 2-3L as BP's allow, near euvolemic.         Electrolytes/pH-K 4.6, bicarb 17, no acute issues, running HD.        BMD- Ca 8.3, alb 3.1 last check, phos 5.2 last check, no acute issues.      Heart Tx-On mycophenolate and methylpred.      ID-WBC up, no fevers today.  Had pan CT this am to look for source, report pending.  No SOB, CXR yesterday did not suggest PNA.  On bactrim, Vanco, cefepime and flagyl.        Anemia-Cont venofer 100 mg Qtues, cont Epo 4000 u with dialysis.      Nutrition-Taking PO.      Discussed with Dr Fried      Recommendations were communicated to team via verbal communication.       Moris Sevilla  Clinical Nurse Specialist  [phone]    Review of Systems:   I reviewed the following systems:  Gen: No fevers or chills  CV: No CP  Resp: No SOB  GI: No N/V    Physical Exam:   I/O last 3 completed shifts:  In: 1030 [P.O.:800; I.V.:230]  Out: -    /67  Pulse 105  Temp 97.9  F (36.6  C) (Oral)  Resp 18  Ht 1.727 m (5' 8\")  Wt 78.2 kg (172 lb 6.4 oz)  SpO2 100%  BMI 26.21 kg/m2 "        GENERAL APPEARANCE: Lying in bed, in no distress  EYES:  No scleral icterus, pupils equal  HENT: mouth without ulcers or lesions  PULM: lungs clear to auscultation, equal air movement, no cyanosis or clubbing  CV: regular rhythm, normal rate, no rub     -JVP not elevated     -edema trace LE.  GI: soft, non-tender, not distended, bowel sounds are +  MS: no evidence of inflammation in joints, no muscle tenderness  NEURO: Alert and oriented  Lines-tunneled HD line    Labs:   All labs reviewed by me  Electrolytes/Renal -   Recent Labs   Lab Test  06/28/18   0554  06/27/18   0629  06/26/18   0433   06/19/18   0657  06/18/18   0827  06/17/18   1630   NA  133  135  129*   < >  128*  130*  130*   POTASSIUM  4.6  4.3  5.7*   < >  5.1  5.0  4.6   CHLORIDE  98  99  92*   < >  94  94  94   CO2  17*  22  18*   < >  18*  22  23   BUN  64*  51*  109*   < >  68*  52*  34*   CR  6.98*  4.95*  8.00*   < >  6.50*  5.01*  3.48*   GLC  290*  200*  268*   < >  170*  113*  168*   TRINI  8.3*  8.3*  9.2   < >  9.0  9.0  9.6   MAG   --    --    --    --   2.2  2.2  2.2   PHOS   --    --    --    --   5.2*  4.0  4.3    < > = values in this interval not displayed.       CBC -   Recent Labs   Lab Test  06/28/18   0554  06/27/18   1529  06/27/18   0629  06/26/18   0433   WBC  13.8*   --   14.7*  21.7*   HGB  9.1*  9.4*  8.7*  10.3*   PLT  256   --   208  301       LFTs -   Recent Labs   Lab Test  06/23/18   2126  06/18/18   0827  06/17/18   0433   ALKPHOS  136  114  105   BILITOTAL  0.6  0.8  1.1   ALT  26  34  33   AST  13  56*  92*   PROTTOTAL  7.0  6.9  7.2   ALBUMIN  3.1*  3.3*  3.5       Iron Panel -   Recent Labs   Lab Test  05/08/18   0954  07/19/17   1306  07/05/17   1204   IRON  58  46  26*   IRONSAT  27  18  12*   ALBERTINA  621*  369  542*           Current Medications:    apixaban ANTICOAGULANT  2.5 mg Oral BID     aspirin  81 mg Oral Daily     ceFEPIme (MAXIPIME) IV  1 g Intravenous Q24H     hydrALAZINE  100 mg Oral Q6H     insulin  aspart   Subcutaneous TID w/meals     insulin aspart  1-10 Units Subcutaneous TID AC     insulin aspart  1-7 Units Subcutaneous At Bedtime     insulin glargine  15 Units Subcutaneous Q24H     isosorbide dinitrate  10 mg Oral Q8H     metroNIDAZOLE  500 mg Intravenous Q8H     mycophenolate  1,500 mg Oral BID IS     NEPHROCAPS  1 capsule Oral Daily     nystatin  1,000,000 Units Swish & Swallow 4x Daily     pantoprazole  40 mg Oral QAM     pravastatin  40 mg Oral Daily     predniSONE  20 mg Oral BID     senna-docusate  1 tablet Oral BID     sodium chloride (PF)  3 mL Intracatheter During Hemodialysis (from stock)     sodium chloride (PF)  3 mL Intracatheter During Hemodialysis (from stock)     sodium chloride (PF)  3 mL Intracatheter Q8H     sulfamethoxazole-trimethoprim  1 half-tab Oral QPM     tacrolimus  3 mg Oral QPM     tacrolimus  3 mg Oral QAM     terbutaline  5 mg Oral or Feeding Tube Q8H     valGANciclovir  450 mg Oral Once per day on Mon Thu     vancomycin place bui - receiving intermittent dosing  1 each Does not apply See Admin Instructions       IV fluid REPLACEMENT ONLY       IV fluid REPLACEMENT ONLY       dextrose 5% and 0.45% NaCl 10 mL/hr at 06/15/18 0900     Reason beta blocker order not selected

## 2018-06-29 ENCOUNTER — APPOINTMENT (OUTPATIENT)
Dept: OCCUPATIONAL THERAPY | Facility: CLINIC | Age: 63
DRG: 001 | End: 2018-06-29
Attending: INTERNAL MEDICINE
Payer: COMMERCIAL

## 2018-06-29 LAB
ANION GAP SERPL CALCULATED.3IONS-SCNC: 12 MMOL/L (ref 3–14)
APTT PPP: 30 SEC (ref 22–37)
BUN SERPL-MCNC: 38 MG/DL (ref 7–30)
CALCIUM SERPL-MCNC: 8.6 MG/DL (ref 8.5–10.1)
CHLORIDE SERPL-SCNC: 101 MMOL/L (ref 94–109)
CO2 SERPL-SCNC: 23 MMOL/L (ref 20–32)
CREAT SERPL-MCNC: 4.77 MG/DL (ref 0.66–1.25)
ENTERIC PATHOGEN COMMENT: NORMAL
ENTERIC PATHOGEN COMMENT: NORMAL
ERYTHROCYTE [DISTWIDTH] IN BLOOD BY AUTOMATED COUNT: 18.2 % (ref 10–15)
GFR SERPL CREATININE-BSD FRML MDRD: 12 ML/MIN/1.7M2
GLUCOSE BLDC GLUCOMTR-MCNC: 121 MG/DL (ref 70–99)
GLUCOSE BLDC GLUCOMTR-MCNC: 146 MG/DL (ref 70–99)
GLUCOSE BLDC GLUCOMTR-MCNC: 183 MG/DL (ref 70–99)
GLUCOSE BLDC GLUCOMTR-MCNC: 194 MG/DL (ref 70–99)
GLUCOSE BLDC GLUCOMTR-MCNC: 215 MG/DL (ref 70–99)
GLUCOSE BLDC GLUCOMTR-MCNC: 236 MG/DL (ref 70–99)
GLUCOSE SERPL-MCNC: 210 MG/DL (ref 70–99)
HADV AG STL QL IA: NEGATIVE
HCT VFR BLD AUTO: 26.2 % (ref 40–53)
HGB BLD-MCNC: 8.7 G/DL (ref 13.3–17.7)
INTERPRETATION ECG - MUSE: NORMAL
MCH RBC QN AUTO: 29.2 PG (ref 26.5–33)
MCHC RBC AUTO-ENTMCNC: 33.2 G/DL (ref 31.5–36.5)
MCV RBC AUTO: 88 FL (ref 78–100)
NOROV GI+II ORF1-ORF2 JNC STL QL NAA+PR: NOT DETECTED
PLATELET # BLD AUTO: 227 10E9/L (ref 150–450)
POTASSIUM SERPL-SCNC: 4.6 MMOL/L (ref 3.4–5.3)
RBC # BLD AUTO: 2.98 10E12/L (ref 4.4–5.9)
RVA NSP5 STL QL NAA+PROBE: NOT DETECTED
SODIUM SERPL-SCNC: 136 MMOL/L (ref 133–144)
WBC # BLD AUTO: 11.4 10E9/L (ref 4–11)

## 2018-06-29 PROCEDURE — 85027 COMPLETE CBC AUTOMATED: CPT | Performed by: THORACIC SURGERY (CARDIOTHORACIC VASCULAR SURGERY)

## 2018-06-29 PROCEDURE — 25000132 ZZH RX MED GY IP 250 OP 250 PS 637: Performed by: PHYSICIAN ASSISTANT

## 2018-06-29 PROCEDURE — 25000131 ZZH RX MED GY IP 250 OP 636 PS 637: Performed by: STUDENT IN AN ORGANIZED HEALTH CARE EDUCATION/TRAINING PROGRAM

## 2018-06-29 PROCEDURE — 85730 THROMBOPLASTIN TIME PARTIAL: CPT | Performed by: THORACIC SURGERY (CARDIOTHORACIC VASCULAR SURGERY)

## 2018-06-29 PROCEDURE — 25000132 ZZH RX MED GY IP 250 OP 250 PS 637: Performed by: THORACIC SURGERY (CARDIOTHORACIC VASCULAR SURGERY)

## 2018-06-29 PROCEDURE — 25000128 H RX IP 250 OP 636: Performed by: PHYSICIAN ASSISTANT

## 2018-06-29 PROCEDURE — 25000132 ZZH RX MED GY IP 250 OP 250 PS 637: Performed by: STUDENT IN AN ORGANIZED HEALTH CARE EDUCATION/TRAINING PROGRAM

## 2018-06-29 PROCEDURE — 36592 COLLECT BLOOD FROM PICC: CPT | Performed by: THORACIC SURGERY (CARDIOTHORACIC VASCULAR SURGERY)

## 2018-06-29 PROCEDURE — 00000146 ZZHCL STATISTIC GLUCOSE BY METER IP

## 2018-06-29 PROCEDURE — 40000802 ZZH SITE CHECK

## 2018-06-29 PROCEDURE — 25000125 ZZHC RX 250: Performed by: STUDENT IN AN ORGANIZED HEALTH CARE EDUCATION/TRAINING PROGRAM

## 2018-06-29 PROCEDURE — 99232 SBSQ HOSP IP/OBS MODERATE 35: CPT | Mod: GC | Performed by: INTERNAL MEDICINE

## 2018-06-29 PROCEDURE — 21400006 ZZH R&B CCU INTERMEDIATE UMMC

## 2018-06-29 PROCEDURE — 40000133 ZZH STATISTIC OT WARD VISIT: Performed by: OCCUPATIONAL THERAPIST

## 2018-06-29 PROCEDURE — 97110 THERAPEUTIC EXERCISES: CPT | Mod: GO | Performed by: OCCUPATIONAL THERAPIST

## 2018-06-29 PROCEDURE — 25000125 ZZHC RX 250: Performed by: PHYSICIAN ASSISTANT

## 2018-06-29 PROCEDURE — 80048 BASIC METABOLIC PNL TOTAL CA: CPT | Performed by: THORACIC SURGERY (CARDIOTHORACIC VASCULAR SURGERY)

## 2018-06-29 RX ADMIN — CEFEPIME HYDROCHLORIDE 1 G: 1 INJECTION, POWDER, FOR SOLUTION INTRAMUSCULAR; INTRAVENOUS at 15:31

## 2018-06-29 RX ADMIN — PRAVASTATIN SODIUM 40 MG: 40 TABLET ORAL at 19:51

## 2018-06-29 RX ADMIN — Medication 1 HALF-TAB: at 19:50

## 2018-06-29 RX ADMIN — HYDRALAZINE HYDROCHLORIDE 100 MG: 100 TABLET ORAL at 06:39

## 2018-06-29 RX ADMIN — NYSTATIN 1000000 UNITS: 100000 SUSPENSION ORAL at 07:50

## 2018-06-29 RX ADMIN — HYDRALAZINE HYDROCHLORIDE 100 MG: 100 TABLET ORAL at 18:28

## 2018-06-29 RX ADMIN — METRONIDAZOLE 500 MG: 500 INJECTION, SOLUTION INTRAVENOUS at 16:23

## 2018-06-29 RX ADMIN — PANTOPRAZOLE SODIUM 40 MG: 40 TABLET, DELAYED RELEASE ORAL at 07:50

## 2018-06-29 RX ADMIN — METRONIDAZOLE 500 MG: 500 INJECTION, SOLUTION INTRAVENOUS at 08:41

## 2018-06-29 RX ADMIN — TRAMADOL HYDROCHLORIDE 50 MG: 50 TABLET, COATED ORAL at 21:40

## 2018-06-29 RX ADMIN — PREDNISONE 15 MG: 5 TABLET ORAL at 19:51

## 2018-06-29 RX ADMIN — Medication 1 CAPSULE: at 19:53

## 2018-06-29 RX ADMIN — TACROLIMUS 3 MG: 1 CAPSULE ORAL at 07:47

## 2018-06-29 RX ADMIN — NYSTATIN 1000000 UNITS: 100000 SUSPENSION ORAL at 18:28

## 2018-06-29 RX ADMIN — TACROLIMUS 2.5 MG: 1 CAPSULE ORAL at 18:29

## 2018-06-29 RX ADMIN — Medication 12.5 MG: at 07:48

## 2018-06-29 RX ADMIN — MYCOPHENOLATE MOFETIL 1500 MG: 250 CAPSULE ORAL at 07:47

## 2018-06-29 RX ADMIN — APIXABAN 2.5 MG: 2.5 TABLET, FILM COATED ORAL at 07:50

## 2018-06-29 RX ADMIN — TERBUTALINE SULFATE 5 MG: 5 TABLET ORAL at 07:50

## 2018-06-29 RX ADMIN — HYDRALAZINE HYDROCHLORIDE 100 MG: 100 TABLET ORAL at 12:20

## 2018-06-29 RX ADMIN — METRONIDAZOLE 500 MG: 500 INJECTION, SOLUTION INTRAVENOUS at 00:33

## 2018-06-29 RX ADMIN — TRAMADOL HYDROCHLORIDE 50 MG: 50 TABLET, COATED ORAL at 02:15

## 2018-06-29 RX ADMIN — APIXABAN 2.5 MG: 2.5 TABLET, FILM COATED ORAL at 19:51

## 2018-06-29 RX ADMIN — PREDNISONE 20 MG: 20 TABLET ORAL at 07:48

## 2018-06-29 RX ADMIN — NYSTATIN 1000000 UNITS: 100000 SUSPENSION ORAL at 12:20

## 2018-06-29 RX ADMIN — ASPIRIN 81 MG: 81 TABLET, COATED ORAL at 07:50

## 2018-06-29 RX ADMIN — NYSTATIN 1000000 UNITS: 100000 SUSPENSION ORAL at 21:32

## 2018-06-29 RX ADMIN — TERBUTALINE SULFATE 5 MG: 5 TABLET ORAL at 15:31

## 2018-06-29 RX ADMIN — MYCOPHENOLATE MOFETIL 1500 MG: 250 CAPSULE ORAL at 18:28

## 2018-06-29 ASSESSMENT — PAIN DESCRIPTION - DESCRIPTORS
DESCRIPTORS: HEADACHE
DESCRIPTORS: ACHING

## 2018-06-29 NOTE — PROVIDER NOTIFICATION
D/I/A: Pt BP decreased with increasing headache s/p Tylenol.  [employer] notified.  Other VSSA.  Denies other symptoms. Sepsis alert triggered, orders released per protocol.  P: Continue to monitor status.

## 2018-06-29 NOTE — PROGRESS NOTES
"      Advanced Heart Failure and Transplant Cardiology  Consult Note    Assessment and Plan:  63 year old male with a PMH of CAD, ICM (EF of 15-20%), ESRD, bicuspid aortic valve with AI, severe MR, and proximal AAA who was admitted mid-April for decompensated heart failure. He underwent OHT on 6/15/2018.        # Immunosuppression:   - Mycophenolate 1500 mg BID, prednisone 20 mg / 15 mg, Tacrolimus 3 mg / 2.5 mg   - goal Tacro 10-12 ug/L  - last biopsy: 6/27: 1R  - next biopsy Wednesday 7/4    # Prophylaxis: CMV (- / +), EBV (+ / pending):   - Nystatin, Valcyte, Bactrim, asa 81 mg daily, Pravastatin 40 mg daily    WBC now normal, and ID planning to de-escalate antibiotics with knowledge of HD catheter thrombus (+) culture a few weeks back. No other clinical changes of recent. We will follow Tacrolimus level tomorrow and adjust accordingly. Okay to get rid of the low dose beta-blocker. If diarrhea worsens or persists, could entertain using Myfortic in lieu of Cellcept in the future. Will wean terbutaline outpatient.    Recommendations:  - next Tacrolimus level Saturday AM  - continue IS and prophylaxis as ordered     Seen and discussed with Dr. Blum.  Sukumar Mejía, CVD Fellow  ================================================================    Interval History   - no acute events  - feels well walking the halls  - still has diarrhea but improving    Physical Exam   Temp: 98.3  F (36.8  C) Temp src: Oral BP: 112/76 Pulse: 104 Heart Rate: 92 Resp: 16 SpO2: 99 % O2 Device: None (Room air)    Vitals:    06/26/18 0630 06/27/18 0540 06/28/18 0624   Weight: 76.9 kg (169 lb 8 oz) 78 kg (172 lb) 78.2 kg (172 lb 6.4 oz)     Heart Rate: 92, Blood pressure 112/76, pulse 104, temperature 98.3  F (36.8  C), temperature source Oral, resp. rate 16, height 1.727 m (5' 8\"), weight 78.2 kg (172 lb 6.4 oz), SpO2 99 %.  172 lbs 6.4 oz  GEN: NAD  CV: tachycardic, no murmur, no S3 or S4  LUNGS:  Lungs with decreased breath sounds  ABD: " Active bowel sounds, soft, non-tender/non-distended.  No rebound/guarding/rigidity.  EXT: No edema or cyanosis.      Medications:  - reviewed in Epic    Data:  - all labs and imaging reviewed

## 2018-06-29 NOTE — PLAN OF CARE
Problem: Patient Care Overview  Goal: Plan of Care/Patient Progress Review  RN:  1.Pt will remain hemodynamically stable.  2.BG WNL.   Discharge Planner OT   Patient plan for discharge: Home with [facility]  Current status: Pt able to complete ~1100ft functional mobility with (I); pt indicated plan to obtain 4ww for community exercise upon discharge and writer provided education on appropriate sizing/use. Pt also completed 20 min on Nustep with mild fatigue. Pt's HR 92-96, O2 sats % on RA, pre BP 96/64 (MAP 75), post 106/75 (74).  Barriers to return to prior living situation: none  Recommendations for discharge: Home with [facility]  Rationale for recommendations: to promote endurance for ADL/IADLs       Entered by: Kenna Prince 06/29/2018 12:00 PM

## 2018-06-29 NOTE — PROGRESS NOTES
"Nephrology Progress Note  06/29/2018         Murray Nicholson is a 63 year old year old male with PMH of HTN, DMII, functionally bicuspid aortic valve, CHF/CM (EF 10-15%), ESRD, admitted with worsening dyspnea/SOB, now S/P heart tx 6/14 and started on CRRT, Nephrology managing HD/CRRT.      Interval History  Mr Nicholson is recovering from heart tx 6/14, had elevated WBC this week but now back to normal with workup with cultures and CT unremarkable.  Likely discharge early next week, plan for run tomorrow on TTS schedule, no major changes in HD plan.        Assessment & Recommendations:   ESRD: due to DM, on PD since Aug 2017, changed to HD 1/18, now TTS, at [facility] under care Dr Mcpherson, RI tunnel, EDW 75.5-76 kg prior to tx, 3.5 hrs.                          -HD on TTS schedule, plan to run tomorrow.                         -Line is tunneled [facility]        Volume-EDW 76kg as outpt, 78.2 prior to run yesterday, no wt today but did have 3L of UF without issue.  Similar plan for run tomorrow.         Electrolytes/pH-K 4.6, bicarb 17, no acute issues, running HD.        BMD- Ca 8.3, alb 3.1 last check, phos 5.2 last check, no acute issues.      Heart Tx-On mycophenolate and methylpred, started tacro, within goal levels.        ID-WBC now WNL, CT unremarkable.        Anemia-Cont venofer 100 mg Qtues, cont Epo 4000 u with dialysis.      Nutrition-Taking PO.      Seen and discussed with Dr Fried      Recommendations were communicated to team via verbal communication.         Moris Sevilla  Clinical Nurse Specialist  [phone]    Review of Systems:   I reviewed the following systems:  Gen: No fevers or chills  CV: No CP  Resp: No SOB  GI: No N/V    Physical Exam:   I/O last 3 completed shifts:  In: 500 [P.O.:300; I.V.:200]  Out: 3010 [Urine:10; Other:3000]   /84 (BP Location: Right arm)  Pulse 104  Temp 98.1  F (36.7  C) (Oral)  Resp 16  Ht 1.727 m (5' 8\")  Wt 78.2 kg (172 lb 6.4 oz)  SpO2 100%  BMI 26.21 " kg/m2     GENERAL APPEARANCE: Sitting in chair, in no distress  EYES:  No scleral icterus, pupils equal  HENT: mouth without ulcers or lesions  PULM: lungs clear to auscultation, equal air movement, no cyanosis or clubbing  CV: regular rhythm, normal rate, no rub     -JVP not elevated     -edema trace LE.  GI: soft, non-tender, not distended, bowel sounds are +  MS: no evidence of inflammation in joints, no muscle tenderness  NEURO: Alert and oriented  Lines-tunneled HD line    Labs:   All labs reviewed by me  Electrolytes/Renal -   Recent Labs   Lab Test  06/29/18   0545  06/28/18   0554  06/27/18   0629   06/19/18   0657  06/18/18   0827  06/17/18   1630   NA  136  133  135   < >  128*  130*  130*   POTASSIUM  4.6  4.6  4.3   < >  5.1  5.0  4.6   CHLORIDE  101  98  99   < >  94  94  94   CO2  23  17*  22   < >  18*  22  23   BUN  38*  64*  51*   < >  68*  52*  34*   CR  4.77*  6.98*  4.95*   < >  6.50*  5.01*  3.48*   GLC  210*  290*  200*   < >  170*  113*  168*   TRINI  8.6  8.3*  8.3*   < >  9.0  9.0  9.6   MAG   --    --    --    --   2.2  2.2  2.2   PHOS   --    --    --    --   5.2*  4.0  4.3    < > = values in this interval not displayed.       CBC -   Recent Labs   Lab Test  06/29/18   0545  06/28/18   0554  06/27/18   1529  06/27/18   0629   WBC  11.4*  13.8*   --   14.7*   HGB  8.7*  9.1*  9.4*  8.7*   PLT  227  256   --   208       LFTs -   Recent Labs   Lab Test  06/23/18   2126  06/18/18   0827  06/17/18   0433   ALKPHOS  136  114  105   BILITOTAL  0.6  0.8  1.1   ALT  26  34  33   AST  13  56*  92*   PROTTOTAL  7.0  6.9  7.2   ALBUMIN  3.1*  3.3*  3.5       Iron Panel -   Recent Labs   Lab Test  05/08/18   0954  07/19/17   1306  07/05/17   1204   IRON  58  46  26*   IRONSAT  27  18  12*   ALBERTINA  621*  369  542*           Current Medications:    apixaban ANTICOAGULANT  2.5 mg Oral BID     aspirin  81 mg Oral Daily     ceFEPIme (MAXIPIME) IV  1 g Intravenous Q24H     hydrALAZINE  100 mg Oral Q6H      insulin aspart   Subcutaneous TID w/meals     insulin aspart  1-10 Units Subcutaneous TID AC     insulin aspart  1-7 Units Subcutaneous At Bedtime     insulin glargine  15 Units Subcutaneous Q24H     metroNIDAZOLE  500 mg Intravenous Q8H     mycophenolate  1,500 mg Oral BID IS     NEPHROCAPS  1 capsule Oral Daily     nystatin  1,000,000 Units Swish & Swallow 4x Daily     pantoprazole  40 mg Oral QAM     pravastatin  40 mg Oral Daily     predniSONE  15 mg Oral QPM     predniSONE  20 mg Oral QAM     senna-docusate  1 tablet Oral BID     sodium chloride (PF)  3 mL Intracatheter Q8H     sulfamethoxazole-trimethoprim  1 half-tab Oral QPM     tacrolimus  2.5 mg Oral QPM     tacrolimus  3 mg Oral QAM     terbutaline  5 mg Oral or Feeding Tube Q8H     valGANciclovir  450 mg Oral Once per day on Mon Thu     vancomycin place bui - receiving intermittent dosing  1 each Does not apply See Admin Instructions       IV fluid REPLACEMENT ONLY       IV fluid REPLACEMENT ONLY       dextrose 5% and 0.45% NaCl 10 mL/hr at 06/15/18 0900     Reason beta blocker order not selected

## 2018-06-29 NOTE — PROGRESS NOTES
[facility]    Transplant Infectious Diseases Inpatient Progress note      Murray Nicholson MRN# [id number]   YOB: 1955 Age: 63 year old   Date of Admission: 4/16/2018  8:12 PM  Transplant: 6/14/2018 (Heart), Postoperative day: 15     This note was attested by Dr. Richards  ATTESTATION   I interviewed and examined the patient myself. I also reviewed the chart and discussed the case with Dr. Boyle.   I agree with the assessment and plan as written which reflects my overall impression and recommendations.       Mack Richards  6/27/2018   Pager: [phone]           Recommendations:   1. DC cefepime and flagyl IV after last dose today.  2. Vancomycin IV till 7/10/2018 (total of 14 days).   3. Start cefpodoxime 200 mg after HD on Saturday 6/30/2018, Tuesday 7/3/2018 and Thursday 7/5/2018 then DC (total of 10 days).   4. Start PO flagyl 250 mg q8 hr starting 6/30/2018 till 7/6/2018 (total of 10 days).     ID will sign off, please call for questions.         Summary of Presentation:   Transplants:  6/14/2018 (Heart), Postoperative day:  15     This patient is a 63 year old male with DM, ESRD, CHF s/p OHT with resection of the patient's proximal ascending aortic aneurysm and reinforcement of the distal aortic stump with bovine pericardial strips.  solumedrol induction therapy. Now on TAC/MMF/tapering prednisone for maintenance.   Also was found to have thrombus in the native right atrium with retention of preexisting HD catheter.      ID consulted for leukocytosis.        Active Problems and Infectious Diseases Issues:   1. Leukocytosis.   No active infectious process is identified.   We believe the elevated WBC is most likely a leukemoid reaction to either or a combination of recent OHTx, pneumoperitoneum, and increased steroid use. However, it is difficult to ignore the improving WBC after initiating vancomycin/cefepime/flagyl on 6/26/2018.   Given all above, we would not stop ABx  but it is reasonable to start de-escalation.   We can DC cefepime and flagyl and switch to PO cefpodoxime and PO flagyl.   Since the Coag Neg Staph was on the thrombus with retention of the HD catheter I will treat for 14 days of vancomycin.     2. Coag Neg Staph on the thrombus on the tip of HD catheter with retention of the catheter at the time of OH.   Though this is likely not to be consequential I will treat with Vancomycin IV for total of 14 days.   The bovine graft is left sided and is not likely to be involved.     3. Diarrhea  Improving according to patient.   Workup included Norovirus and Rotavirus are negative. Adenovirus is pending. CT abdomen and pelvis without colitis.   This is likely due to drugs including ABx and MMF.         Old Problems and Infectious Diseases Issues:   1. Peritonitis related to PD catheter infection with Strep sp, E coli, P aeruginosa in 12/2017. And C glabrata and Bacteroides cacca in 1/2018 with negative CT abdomen and removal of PD catheter and conversion to HD.      Other Infectious Disease issues include:  - PCP prophylaxis: bactrim.   - Serostatus: CMV D+/R-, EBV D?/R+, HSV1-/2+, VZV +  - Immunization status: Too soon to discuss.   - Gamma globulin status: not recently checked.        Attestation:  I interviewed the patient and obtained history from the patient, and by reviewing the patient's chart including outside records, microbiological data, and radiological data. All data are summarized in this notes.  Mack Richards MD    Pager: [phone]  06/29/2018    Interim History and Events:   Pt continues to experience mild, crampy diffuse abdominal pain, however, diarrhea has improved.   No fever or chills.   Overall he feels better.     HPI:  This patient is a 63 year old male with DM, ESRD, CHF s/p OHT.   He started to develop leukocytosis with no localizing symptoms except diarrhea today with crampy abdominal pain that resolves after BM.   No N/V but has poor appetite.    It seems that the patient today is hypotensive during HD.      The patient used to be on PD. In 12/2017 he was diagnosed with peritonitis due to E coli, P aeruginosa, and Strep sp. He was treated with intraperitoneal ceftazidime then needed and admission in 1/2018 for IV ABx. During that admission, peritoneal fluid grew C glabrata and Bacteroides cacca. CT abdomen and pelvis was unremarkable. PD catheter was removed.   The patient finished 2 weeks of IV ABx and 2 weeks of micafungin. (the C glabrata was susceptible to fluconazole with LILIAN of 4).       ROS:  As mentioned in the interim history otherwise negative by reviewing constitutional symptoms, central and peripheral neurological systems, respiratory system, cardiac system, GI system,  system, musculoskeletal, skin, allergy, and lymphatics.                 Pysical Examination:  Temp: 98.1  F (36.7  C) Temp src: Oral BP: 140/84 Pulse: 104 Heart Rate: 94 Resp: 16 SpO2: 100 % O2 Device: None (Room air)    Vitals:    06/24/18 0500 06/25/18 0552 06/26/18 0630 06/27/18 0540   Weight: 74.5 kg (164 lb 3.2 oz) 76.1 kg (167 lb 12.8 oz) 76.9 kg (169 lb 8 oz) 78 kg (172 lb)    06/28/18 0624   Weight: 78.2 kg (172 lb 6.4 oz)   Constitutional: awake, alert, cooperative, no apparent distress and appears at stated age, well nourished.     Neurologic: Patient is moving all extremities without focal deficit, no focal sensory loss.   Skin: no induration, fluctuation or discharge at the HD catheter in right chest wall, and no rash.       Medications:  Medications that Require Transfusion:     IV fluid REPLACEMENT ONLY       IV fluid REPLACEMENT ONLY       dextrose 5% and 0.45% NaCl 10 mL/hr at 06/15/18 0900     Reason beta blocker order not selected     Scheduled Medications:     apixaban ANTICOAGULANT  2.5 mg Oral BID     aspirin  81 mg Oral Daily     ceFEPIme (MAXIPIME) IV  1 g Intravenous Q24H     hydrALAZINE  100 mg Oral Q6H     insulin aspart   Subcutaneous TID w/meals      insulin aspart  1-10 Units Subcutaneous TID AC     insulin aspart  1-7 Units Subcutaneous At Bedtime     insulin glargine  15 Units Subcutaneous Q24H     metroNIDAZOLE  500 mg Intravenous Q8H     mycophenolate  1,500 mg Oral BID IS     NEPHROCAPS  1 capsule Oral Daily     nystatin  1,000,000 Units Swish & Swallow 4x Daily     pantoprazole  40 mg Oral QAM     pravastatin  40 mg Oral Daily     predniSONE  15 mg Oral QPM     predniSONE  20 mg Oral QAM     senna-docusate  1 tablet Oral BID     sodium chloride (PF)  3 mL Intracatheter Q8H     sulfamethoxazole-trimethoprim  1 half-tab Oral QPM     tacrolimus  2.5 mg Oral QPM     tacrolimus  3 mg Oral QAM     terbutaline  5 mg Oral or Feeding Tube Q8H     valGANciclovir  450 mg Oral Once per day on Mon Thu     vancomycin place bui - receiving intermittent dosing  1 each Does not apply See Admin Instructions       Laboratory Data:   No results found for: ACD4    Inflammatory Markers    Recent Labs   Lab Test  07/05/17   1204  05/31/17   1111  05/17/17   1214  04/13/17   0651  04/12/17   0935  04/11/17   1319  01/13/16   1337   SED   --    --    --    --    --    --   80*   CRP  35.4*  15.9*  39.3*  270.0*  200.0*  130.0*   --        Immune Globulin Studies     Recent Labs   Lab Test  05/19/15   1000   IGG  1380   IGM  108   IGA  203       Metabolic Studies       Recent Labs   Lab Test  06/29/18   0545  06/28/18   0554  06/27/18   0629  06/26/18   0433  06/25/18   0509  06/24/18   0514  06/24/18   0150   06/19/18   0657  06/18/18   0827   04/26/18   0645   04/12/17   0935   NA  136  133  135  129*  129*  131*   --    < >  128*  130*   < >   --    < >   --    POTASSIUM  4.6  4.6  4.3  5.7*  5.0  4.5   --    < >  5.1  5.0   < >   --    < >   --    CHLORIDE  101  98  99  92*  93*  93*   --    < >  94  94   < >   --    < >   --    CO2  23  17*  22  18*  19*  24   --    < >  18*  22   < >   --    < >   --    ANIONGAP  12  18*  14  18*  18*  13   --    < >  16*  14   < >   --     < >   --    BUN  38*  64*  51*  109*  82*  53*   --    < >  68*  52*   < >   --    < >   --    CR  4.77*  6.98*  4.95*  8.00*  6.50*  4.69*   --    < >  6.50*  5.01*   < >   --    < >   --    GFRESTIMATED  12*  8*  12*  7*  9*  13*   --    < >  9*  12*   < >   --    < >   --    GLC  210*  290*  200*  268*  300*  298*   --    < >  170*  113*   < >   --    < >   --    A1C   --    --    --    --    --    --    --    --    --    --    --   7.0*   < >   --    TRINI  8.6  8.3*  8.3*  9.2  8.8  9.0   --    < >  9.0  9.0   < >   --    < >   --    PHOS   --    --    --    --    --    --    --    --   5.2*  4.0   < >   --    < >   --    MAG   --    --    --    --    --    --    --    --   2.2  2.2   < >   --    < >   --    LACT   --    --   0.9   --    --    --   1.4   < >   --    --    < >   --    < >   --    CKT   --    --    --    --    --    --    --    --    --    --    --    --    --   70    < > = values in this interval not displayed.       Hepatic Studies    Recent Labs   Lab Test  06/25/18   0509  06/23/18   2126  06/18/18   0827  06/17/18   0433  06/16/18   0342  06/15/18   2200  06/15/18   1541   05/19/15   1000   BILITOTAL   --   0.6  0.8  1.1  2.0*  2.7*  2.5*   < >  0.4   ALKPHOS   --   136  114  105  85  83  83   < >  126   ALBUMIN   --   3.1*  3.3*  3.5  3.0*  3.0*  3.2*   < >  2.9*   AST   --   13  56*  92*  111*  107*  105*   < >  15   ALT   --   26  34  33  30  28  26   < >  20   LDH  271*   --    --    --    --    --    --    --   252*    < > = values in this interval not displayed.       Pancreatitis testing    Recent Labs   Lab Test  06/14/18   1207  04/16/18   1546  04/11/17   0722  11/29/16   1120  04/14/16   0958  08/10/15   0729  04/09/15   0900   AMYLASE  62   --    --    --    --    --    --    TRIG   --   233*  145  147  240*  421*  426*       Hematology Studies      Recent Labs   Lab Test  06/29/18   0545  06/28/18   0554  06/27/18   1529  06/27/18   0629  06/26/18   0433  06/25/18   0509   06/24/18   0514  06/23/18 2126   06/18/18   0827  06/17/18   0433   06/16/18   0342  06/15/18   2200   WBC  11.4*  13.8*   --   14.7*  21.7*  19.9*  16.7*  14.1*   < >  14.7*  14.7*   < >  12.3*  11.2*   ANEU   --    --    --   13.8*   --    --    --   12.5*   --   13.4*  14.0*   --   11.6*  10.5*   ALYM   --    --    --   0.7*   --    --    --   1.0   --   1.1  0.6*   --   0.5*  0.5*   DK   --    --    --   0.2   --    --    --   0.5   --   0.2  0.2   --   0.2  0.2   AEOS   --    --    --   0.0   --    --    --   0.0   --   0.0  0.0   --   0.0  0.0   HGB  8.7*  9.1*  9.4*  8.7*  10.3*  10.0*  10.4*  10.8*   < >  9.5*  9.2*   < >  8.3*  8.2*   HCT  26.2*  27.5*   --   26.7*  30.0*  30.0*  32.2*  33.2*   < >  29.7*  28.7*   < >  25.8*  25.5*   PLT  227  256   --   208  301  282  270  263   < >  143*  139*   < >  145*  145*    < > = values in this interval not displayed.       Arterial Blood Gas Testing    Recent Labs   Lab Test  06/16/18   0836  06/16/18   0828  06/16/18   0655  06/16/18   0334  06/15/18   2200  06/15/18   1956   PH   --   7.43  7.43  7.46*  7.47*  7.45   PCO2   --   38  38  35  34*  36   PO2   --   120*  171*  180*  169*  172*   HCO3   --   25  25  25  25  25   O2PER  4L  4L  40  40  40  40  40        Urine Studies     Recent Labs   Lab Test  06/25/18   1420  02/05/18   0935  02/04/18   2127  05/03/17   1344  04/08/17   1720   URINEPH  5.0  5.5  Canceled: Specimen improperly labeled. Laboratory value report is not on this patient.  5.0  5.0   NITRITE  Negative  Negative  Canceled: Specimen improperly labeled. Laboratory value report is not on this patient.*  Negative  Negative   LEUKEST  Small*  Negative  Canceled: Specimen improperly labeled. Laboratory value report is not on this patient.*  Negative  Negative   WBCU  9*  4*  Canceled: Specimen improperly labeled. Laboratory value report is not on this patient.*  1  7*       Vancomycin Levels     Recent Labs   Lab Test  06/28/18   0554   01/15/18   0815  01/13/18   1440  01/12/18   1402  01/12/18   0742  01/10/18   1015   VANCOMYCIN  22.8  22.5  28.8*  37.3*  32.2*  21.7       Tobramycin levels     No lab results found.    Gentamicin levels    Recent Labs   Lab Test  01/17/18   0900  01/15/18   0815  01/14/18   0749  01/13/18   1959   GENT  8.2  2.6  3.9  5.2       Tacrolimus levels    Invalid input(s): TACROLIMUS, TAC, TACR  Transplant Immunosuppression Labs Latest Ref Rng & Units 6/29/2018 6/28/2018 6/27/2018 6/26/2018 6/25/2018   Tacro Level 5.0 - 15.0 ug/L - 12.4 - 11.3 8.6   Tacro Level - - Not Provided - Not Provided Not Provided   Creat 0.66 - 1.25 mg/dL 4.77(H) 6.98(H) 4.95(H) 8.00(H) 6.50(H)   BUN 7 - 30 mg/dL 38(H) 64(H) 51(H) 109(H) 82(H)   WBC 4.0 - 11.0 10e9/L 11.4(H) 13.8(H) 14.7(H) 21.7(H) 19.9(H)   Neutrophil % - - 93.5 - -   ANEU 1.6 - 8.3 10e9/L - - 13.8(H) - -       Microbiology:  Last check of C difficile  C Diff Toxin B PCR   Date Value Ref Range Status   06/25/2018 Negative NEG^Negative Final     Comment:     Negative: Clostridium difficile target DNA sequences NOT detected, presumed   negative for Clostridium difficile toxin B or the number of bacteria present   may be below the limit of detection for the test.  FDA approved assay performed using [employer] GeneXpert real-time PCR.  A negative result does not exclude actual disease due to Clostridium difficile   and may be due to improper collection, handling and storage of the specimen   or the number of organisms in the specimen is below the detection limit of the   assay.         Virology:             CMV Antibody IgG   Date Value Ref Range Status   06/14/2018 0.2 0.0 - 0.8 AI Final       Comment:       Negative  Antibody index (AI) values reflect qualitative changes in antibody   concentration that cannot be directly associated with clinical condition or   disease state.   04/16/2018 <0.2 0.0 - 0.8 AI Final       Comment:       Negative  Antibody index (AI) values reflect  qualitative changes in antibody   concentration that cannot be directly associated with clinical condition or   disease state.   08/10/2015 0.2 0.0 - 0.8 AI Final       Comment:       Negative   Antibody index (AI) values reflect qualitative changes in antibody   concentration that cannot be directly associated with clinical condition or   disease state.                 Toxoplasma Antibody IgG   Date Value Ref Range Status   04/16/2018 <3.0 0.0 - 7.1 IU/mL Final       Comment:       Negative- Absence of detectable Toxoplasma gondii IgG antibodies. A negative   result does not rule out acute infection.  The magnitude of the measured result is not indicative of the amount of   antibody present. The concentrations of anti-Toxoplasma gondii IgG in a given   specimen determined with assays from different manufacturers can vary due to   differences in assay methods and reagent specificity.         Imaging:  CT c/a/p 6/26/2018   IMPRESSION:   1. New postoperative changes of cardiac transplantation and aortic  root reconstruction:  2. Left basilar opacities, likely atelectasis. Superimposed infection  is difficult to exclude on CT.  3. Small amount of poorly defined anterior mediastinal fluid, likely  postoperative blood products.  4. Large amount of pneumoperitoneum is again seen.  5. 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    XR ABDOMEN 2 VW, 6/27/2018 10:53 AM    Impression:   1. Redemonstration of large volume pneumoperitoneum without  significant interval change.  2. Nonobstructive bowel gas pattern.  3. Left basilar airspace opacities again noted, which may represent  atelectasis versus aspiration or infection.  4. Postsurgical changes and support devices as detailed above.        Mack Richards MD.  Pager: [phone]

## 2018-06-29 NOTE — PLAN OF CARE
Problem: Patient Care Overview  Goal: Plan of Care/Patient Progress Review  RN:  1.Pt will remain hemodynamically stable.  2.BG WNL.   Outcome: Improving  AVSS.  -183.  Insulin per orders. Ultram for HA per pt request with relief.  Receiving scheduled ABX/Antiinfectives.  Pt stable, pleasant, in no apparentt distress.  Rhythm: SR/ST 's.  Continue to monitor s/p heart transplant.  Follow bx results.  Notify team with any issues/concerns.

## 2018-06-29 NOTE — PROGRESS NOTES
CARDIOTHORACIC SURGERY PROGRESS NOTE  06/29/2018      SUBJECTIVE:    No acute issues over night.   States that he is doing much better. States that pain is being controlled. Breathing is good. Working with IS. Denies any further lightheadedness or dizziness.    Breakfast went well.     Slept well. +BM this am   Continues to have diarrhea. No nausea.     Patient denies SOB, CP.     Continues to ambulate. Denies any popping/clicking in his breast bone.    OBJECTIVE:   Temp:  [98.1  F (36.7  C)-98.4  F (36.9  C)] 98.1  F (36.7  C)  Pulse:  [104] 104  Heart Rate:  [] 94  Resp:  [16-18] 16  BP: ()/(43-85) 140/84  Cuff Mean (mmHg):  [] 96  SpO2:  [98 %-100 %] 100 %    Gen: up in bed, comfortable, -O2  CT removed  CV: RRR, telemetry SR 90s, -edema LE   Pulm: CTA anteriorly  Abd: BS+, NT/ND, soft  Incision: sternal incision D/I    Labs:   BMP    Recent Labs  Lab 06/29/18  0545 06/28/18  0554 06/27/18  0629 06/26/18  0433    133 135 129*   POTASSIUM 4.6 4.6 4.3 5.7*   CHLORIDE 101 98 99 92*   TRINI 8.6 8.3* 8.3* 9.2   CO2 23 17* 22 18*   BUN 38* 64* 51* 109*   CR 4.77* 6.98* 4.95* 8.00*   * 290* 200* 268*     CBC    Recent Labs  Lab 06/29/18  0545 06/28/18  0554 06/27/18  1529 06/27/18  0629 06/26/18  0433   WBC 11.4* 13.8*  --  14.7* 21.7*   RBC 2.98* 3.13*  --  3.00* 3.49*   HGB 8.7* 9.1* 9.4* 8.7* 10.3*   HCT 26.2* 27.5*  --  26.7* 30.0*   MCV 88 88  --  89 86   MCH 29.2 29.1  --  29.0 29.5   MCHC 33.2 33.1  --  32.6 34.3   RDW 18.2* 18.0*  --  17.7* 17.4*    256  --  208 301     INR    Recent Labs  Lab 06/28/18  0554 06/27/18  0629 06/26/18  0433 06/25/18  0509   INR 1.15* 1.10 1.07 1.07      Hepatic Panel     Recent Labs  Lab 06/23/18  2126   AST 13   ALT 26   ALKPHOS 136   BILITOTAL 0.6   ALBUMIN 3.1*     Glucose  Recent Labs  Lab 06/29/18  0744 06/29/18  0545 06/29/18  0217 06/28/18  2302 06/28/18  1852 06/28/18  1626 06/28/18  1345  06/28/18  0554  06/27/18  0629  06/26/18  0433   06/25/18  0509  06/24/18  0514   GLC  --  210*  --   --   --   --   --   --  290*  --  200*  --  268*  --  300*  --  298*   *  --  183* 168* 323* 296* 130*  < >  --   < >  --   < >  --   < >  --   < >  --    < > = values in this interval not displayed.    Imaging:   No results found for this or any previous visit (from the past 24 hour(s)).      ASSESSMENT/PLAN: Patient is a 63 year old male with a history of CAD, ICM (EF of 15-20%), ESRD on HD, bicuspid aortic valve with AI, severe MR, and proximal AAA who was admitted for decompensated heart failure 4/16/18 now s/p heart transplant on 6/14/18. 6/20 biopsy showed 1R cellular rejection.    Neuro:  -Acute post-op pain: tylenol and ultram prn    CV:  - ASA 81 mg, pravastatin.  - Blood pressure (goal SBP less than 130 given aortic aneurysm): borderline controlled, on hydralazine 100 mg qid, isordil to 10 mg q8h 6/26  - TPW have been removed  - RHC and biopsy 6/27  - was started on lopressor due to afib (by cardiology), will discontinue after d/w Dr Caputo     Resp: extubated POD 2  - IS, encourage activity  - All chest tubes have been removed, CXR with stable pneumoperitoneum (likely from chest tube removal). Has LLL opacity, stable, CT chest 6/26 unremarkable      FEN/GI:   - regular diet, hold bowel regimen, Multiple BM with trace blood on toilet paper, repeating abdominal film today. No abdominal distention or tenderness, general surgery following given pneumoperitonium that has been stable, CT abdomin without bowel inflammation    Renal:   - ESRD on dialsysis, on iHD, scheduled 6/26  - Volume status: dry to slightly hypervolemic, pulling fluid with HD run      ID: Completed daniella-op abx,   - WBC trending down, afebrile, Is having some loose stools, c.diff negative 6/25, UA unremarkable, CXR with stable LLL opacity, will start vanc/cefipime (6/26), ID consult, recommended addition of IV flagyl. CT c/a/p with and without contrast unremarkable. ID will make  recommendations for antibiotics.  - Immunosuppression per cards    Heme:   - Hgb 8.7, stable.       Endo:   - BG consistently above 200, on SSI started lantus 8U 6/25, consult endocrine      PPx:   - PPI      Anticoagulation:   - Apixaban for right IJ thrombus       Dispo:   - 6C since 6/17  - looking at possible discharge Monday/Tuesday    Encouraged IS/TCDB/amb. Sternal precautions. Continue to monitor.   Staff surgeons have been informed of changes through both verbal and written communication.       Silviano Baker PA-C  [phone]

## 2018-06-29 NOTE — PROGRESS NOTES
Diabetes Consult Daily  Progress Note          Assessment/Plan:    Murray Nicholson is a 63 year old male with history of type 2 diabetes, hypertension, hyperlipidemia, ESRD ( HD, TTS), ICM ( EF 10 to 15%) due to valvular hear disease who was admitted for decompensated heart failure (4/16/2018) now s/p orthotopic  heart transplant (6/14/2018).        Type 2 diabetes: PTA glipizide 5 mg   Prednisone 20 mg bid    BG in target range after dialysis yesterday on 15 units lantus and Prednisone 20 mg AM/15 mg PM (day 2 today) white blood count WNL today C.Diff related diarrhea continues but abating,    Plan  -continue  glargine  15 units every 24 hours ( 0800)  -continue Prandial insulin Novolog  1 unit per 10 grams  of CHO for meals and snacks  Continue Aspart high intensity sliding scale before meals and HS  -monitor glucose before meals HS and 0200  -Recommend starting IV insulin if glucose > 350 x 2     Will continue to follow                        Outpatient diabetes follow up: TBD, possible discharge on Monday  Plan discussed with patient           Interval History:   The last 24 hours progress and nursing notes reviewed.  06/29 BG in target range after HD yesterday on 15 units lantus and pred 20 AM/ 15 mg PM, day#2 of this dose     Blood sugars improved 06/26 with increase of lantus to 15 units and increased prandial and increased correction intensity . 06/27 was NPO for most of day for heart cath      appetite is reported as good, denies nausea and or vomiting. still some diarrhea ,Not as frequent (C. Diff)    heart Cath results 06/27 pending, WBC elevated today  HD scheduled for T, Th , Sat  Possible Discharge on Monday      Steroid:  Prednisone 20 mg twice daily      Recent Labs  Lab 06/29/18  1219 06/29/18  0744 06/29/18  0545 06/29/18  0217 06/28/18  2302 06/28/18  1852 06/28/18  1626  06/28/18  0554  06/27/18  0629  06/26/18  0433  06/25/18  0509  06/24/18  0514   GLC  --   --  210*  --   --   " --   --   --  290*  --  200*  --  268*  --  300*  --  298*   * 194*  --  183* 168* 323* 296*  < >  --   < >  --   < >  --   < >  --   < >  --    < > = values in this interval not displayed.            Review of Systems:   See interval hx          Medications:       Active Diet Order      Regular Diet Adult Thin Liquids (water, ice chips, juice, milk, gelatin, ice cream, etc)     Physical Exam:  Gen: Alert, resting in bed, in NAD   HEENT: mucous membranes are moist  Resp: non-labored  Ext: moving all extemiteis  Neuro:oriented x3, communicating clearly  /76 (BP Location: Right arm)  Pulse 104  Temp 98.3  F (36.8  C) (Oral)  Resp 16  Ht 1.727 m (5' 8\")  Wt 78.2 kg (172 lb 6.4 oz)  SpO2 99%  BMI 26.21 kg/m2           Data:     Lab Results   Component Value Date    A1C 7.0 04/26/2018    A1C 6.3 04/04/2018    A1C 8.6 02/02/2018    A1C 9.1 01/08/2018    A1C 6.4 06/27/2017              CBC RESULTS:   Recent Labs   Lab Test  06/26/18   0433   WBC  21.7*   RBC  3.49*   HGB  10.3*   HCT  30.0*   MCV  86   MCH  29.5   MCHC  34.3   RDW  17.4*   PLT  301     Recent Labs   Lab Test  06/26/18   0433  06/25/18   0509   NA  129*  129*   POTASSIUM  5.7*  5.0   CHLORIDE  92*  93*   CO2  18*  19*   ANIONGAP  18*  18*   GLC  268*  300*   BUN  109*  82*   CR  8.00*  6.50*   TRINI  9.2  8.8     Liver Function Studies -   Recent Labs   Lab Test  06/23/18   2126   PROTTOTAL  7.0   ALBUMIN  3.1*   BILITOTAL  0.6   ALKPHOS  136   AST  13   ALT  26     Lab Results   Component Value Date    INR 1.07 06/26/2018    INR 1.07 06/25/2018    INR 1.07 06/24/2018    INR 1.07 06/23/2018    INR 1.09 06/22/2018    INR 1.04 06/21/2018    INR 1.04 06/20/2018    INR 1.01 06/19/2018    INR 1.02 06/18/2018    INR 1.03 06/17/2018    INR 1.16 06/16/2018    INR 1.42 06/15/2018   I spent a total of 35 minutes bedside and on the inpatient unit managing the glycemic care of Murray Nicholson. Over 50% of my time on the unit was spent counseling the " patient  and/or coordinating care regarding glycemic control.    See note for details.    Missy Retana [facility] [phone]  Diabetes Management job code [id number]

## 2018-06-29 NOTE — PLAN OF CARE
Problem: Patient Care Overview  Goal: Plan of Care/Patient Progress Review  RN:  1.Pt will remain hemodynamically stable.  2.BG WNL.   VSS on RA. SR/ST . C/o slight abdominal discomfort in morning, otherwise denies pain. Scheduled iv antibiotics. Up independently, able to make needs known, will continue POC.

## 2018-06-29 NOTE — PROGRESS NOTES
"CLINICAL NUTRITION SERVICES - REASSESSMENT NOTE     Nutrition Prescription    RECOMMENDATIONS FOR MDs/PROVIDERS TO ORDER:  None at this time.    Malnutrition Status:    Non-severe malnutrition in the context of chronic illness    Future/Additional Recommendations:  1. Continue regular diet as ordered to help encourage oral intake. Monitor potential need for high consistent carbohydrate diet restriction. Also, monitor possible need for K+ (4.6 on 6/29) and phos (5.2 on 6/19) restrictions. More than one diet restriction may be ordered as a \"Combination Diet.\"   2. Continue nephrocaps as ordered since pt continues to be on dialysis.   3. Pt's 25 OH vitamin D total lab was 23 on 4/11. Pt's vitamin D deficiency lab was 27 on 5/5. No need for routine calcium/vitamin D supplementation post-op Tx. Per nephrology, pt is getting calcium during dialysis and calcitriol during runs also.    4. Consider checking vitamin B12 status.  5. Monitor BG control. Hgb A1c of 7 on 4/26. DM II hx. Endo is following.  6. Monitor GI status and diet tolerance. Pt remains on scheduled senna which is being held.     EVALUATION OF THE PROGRESS TOWARD GOALS   Diet: Regular diet order since 6/27.   Intake: Good diet tolerance. Flowsheets indicate pt consuming % of meals with a good appetite 6/22, 75% of a meal with a good appetite 6/23, % of a meal with a good appetite 6/24, % of meals with a good appetite 6/25, 50% of a meal with a fair appetite 6/26, and 50-75% of meals with a fair appetite 6/27, and 75% of a meal with a good appetite 6/29. Attempted to see pt. Pt was busy with . Additional factors that may affect oral intake include GI status (pneumoperitoneum noted this admission) and NPO at times for tests/procedures.     NEW FINDINGS   N/A    MALNUTRITION  % Intake: < 75% for > 7 days (non-severe)  % Weight Loss: Weight loss does not meet criteria. Although, difficult to assess with wt changes and fluid status " post-op, and pt on dialysis.   Subcutaneous Fat Loss: Facial region and Lower arm: mild per previous RD note. Pt busy when attempting to see 6/29.  Muscle Loss: Temporal, Thoracic region (clavicle, acromium bone, deltoid, trapezius, pectoral), Upper arm (bicep, tricep) and Lower arm  (forearm): moderate per previous RD note. Pt busy when attempting to see 6/29.  Fluid Accumulation/Edema: None noted  Malnutrition Diagnosis: Non-severe malnutrition in the context of chronic illness    Previous Goals   Patient to consume % of nutritionally adequate meal trays TID, or the equivalent with supplements/snacks.  Evaluation: Meeting at times.    Previous Nutrition Diagnosis  Increased nutrient needs (protein-energy) related to increased needs post-op Tx and with dialysis as evidenced by pt requiring 5960-9403 kcals/day (30 - 35 kcals/kg) and  grams protein/day (1.3 - 1.8 grams of pro/kg).  Evaluation: Unresolved. Continue same nutrition dx.    CURRENT NUTRITION DIAGNOSIS  Increased nutrient needs (protein-energy) related to increased needs post-op Tx and with dialysis as evidenced by pt requiring 3051-7456 kcals/day (30 - 35 kcals/kg) and  grams protein/day (1.3 - 1.8 grams of pro/kg).    INTERVENTIONS  Implementation  None additionally at this time.     Goals  Patient to consume % of nutritionally adequate meal trays TID, or the equivalent with supplements/snacks.    Monitoring/Evaluation  Progress toward goals will be monitored and evaluated per protocol.     Nutrition will continue to follow.     Mary Silva, MS, RD, LD, [facility]   6C Pgr: [phone]

## 2018-06-30 LAB
ANION GAP SERPL CALCULATED.3IONS-SCNC: 16 MMOL/L (ref 3–14)
BACTERIA SPEC CULT: NO GROWTH
BACTERIA SPEC CULT: NO GROWTH
BUN SERPL-MCNC: 57 MG/DL (ref 7–30)
CALCIUM SERPL-MCNC: 8.6 MG/DL (ref 8.5–10.1)
CHLORIDE SERPL-SCNC: 99 MMOL/L (ref 94–109)
CO2 SERPL-SCNC: 18 MMOL/L (ref 20–32)
CREAT SERPL-MCNC: 6.27 MG/DL (ref 0.66–1.25)
CRP SERPL-MCNC: 12 MG/L (ref 0–8)
ERYTHROCYTE [DISTWIDTH] IN BLOOD BY AUTOMATED COUNT: 17.9 % (ref 10–15)
GFR SERPL CREATININE-BSD FRML MDRD: 9 ML/MIN/1.7M2
GLUCOSE BLDC GLUCOMTR-MCNC: 167 MG/DL (ref 70–99)
GLUCOSE BLDC GLUCOMTR-MCNC: 185 MG/DL (ref 70–99)
GLUCOSE BLDC GLUCOMTR-MCNC: 203 MG/DL (ref 70–99)
GLUCOSE BLDC GLUCOMTR-MCNC: 214 MG/DL (ref 70–99)
GLUCOSE BLDC GLUCOMTR-MCNC: 89 MG/DL (ref 70–99)
GLUCOSE SERPL-MCNC: 207 MG/DL (ref 70–99)
HCT VFR BLD AUTO: 26.8 % (ref 40–53)
HGB BLD-MCNC: 8.6 G/DL (ref 13.3–17.7)
LACTATE BLD-SCNC: 1 MMOL/L (ref 0.4–1.9)
Lab: NORMAL
Lab: NORMAL
MCH RBC QN AUTO: 28.9 PG (ref 26.5–33)
MCHC RBC AUTO-ENTMCNC: 32.1 G/DL (ref 31.5–36.5)
MCV RBC AUTO: 90 FL (ref 78–100)
PLATELET # BLD AUTO: 219 10E9/L (ref 150–450)
POTASSIUM SERPL-SCNC: 4.7 MMOL/L (ref 3.4–5.3)
RBC # BLD AUTO: 2.98 10E12/L (ref 4.4–5.9)
SODIUM SERPL-SCNC: 134 MMOL/L (ref 133–144)
SPECIMEN SOURCE: NORMAL
SPECIMEN SOURCE: NORMAL
TACROLIMUS BLD-MCNC: 12 UG/L (ref 5–15)
TME LAST DOSE: NORMAL H
VANCOMYCIN SERPL-MCNC: 24.8 MG/L
WBC # BLD AUTO: 12.5 10E9/L (ref 4–11)

## 2018-06-30 PROCEDURE — 25000132 ZZH RX MED GY IP 250 OP 250 PS 637: Performed by: STUDENT IN AN ORGANIZED HEALTH CARE EDUCATION/TRAINING PROGRAM

## 2018-06-30 PROCEDURE — 25000132 ZZH RX MED GY IP 250 OP 250 PS 637: Performed by: THORACIC SURGERY (CARDIOTHORACIC VASCULAR SURGERY)

## 2018-06-30 PROCEDURE — 63400005 ZZH RX 634: Performed by: CLINICAL NURSE SPECIALIST

## 2018-06-30 PROCEDURE — 80202 ASSAY OF VANCOMYCIN: CPT | Performed by: PHYSICIAN ASSISTANT

## 2018-06-30 PROCEDURE — 80048 BASIC METABOLIC PNL TOTAL CA: CPT | Performed by: PHYSICIAN ASSISTANT

## 2018-06-30 PROCEDURE — 99232 SBSQ HOSP IP/OBS MODERATE 35: CPT | Mod: GC | Performed by: INTERNAL MEDICINE

## 2018-06-30 PROCEDURE — 90937 HEMODIALYSIS REPEATED EVAL: CPT

## 2018-06-30 PROCEDURE — 36592 COLLECT BLOOD FROM PICC: CPT | Performed by: INTERNAL MEDICINE

## 2018-06-30 PROCEDURE — 25000132 ZZH RX MED GY IP 250 OP 250 PS 637: Performed by: PHYSICIAN ASSISTANT

## 2018-06-30 PROCEDURE — 85027 COMPLETE CBC AUTOMATED: CPT | Performed by: PHYSICIAN ASSISTANT

## 2018-06-30 PROCEDURE — 25000125 ZZHC RX 250: Performed by: STUDENT IN AN ORGANIZED HEALTH CARE EDUCATION/TRAINING PROGRAM

## 2018-06-30 PROCEDURE — 86140 C-REACTIVE PROTEIN: CPT | Performed by: THORACIC SURGERY (CARDIOTHORACIC VASCULAR SURGERY)

## 2018-06-30 PROCEDURE — 36592 COLLECT BLOOD FROM PICC: CPT | Performed by: PHYSICIAN ASSISTANT

## 2018-06-30 PROCEDURE — 25000131 ZZH RX MED GY IP 250 OP 636 PS 637: Performed by: STUDENT IN AN ORGANIZED HEALTH CARE EDUCATION/TRAINING PROGRAM

## 2018-06-30 PROCEDURE — 83605 ASSAY OF LACTIC ACID: CPT | Performed by: INTERNAL MEDICINE

## 2018-06-30 PROCEDURE — 21400006 ZZH R&B CCU INTERMEDIATE UMMC

## 2018-06-30 PROCEDURE — 25000128 H RX IP 250 OP 636: Performed by: CLINICAL NURSE SPECIALIST

## 2018-06-30 PROCEDURE — 00000146 ZZHCL STATISTIC GLUCOSE BY METER IP

## 2018-06-30 PROCEDURE — 80197 ASSAY OF TACROLIMUS: CPT | Performed by: STUDENT IN AN ORGANIZED HEALTH CARE EDUCATION/TRAINING PROGRAM

## 2018-06-30 PROCEDURE — 25000125 ZZHC RX 250: Performed by: PHYSICIAN ASSISTANT

## 2018-06-30 PROCEDURE — 25000132 ZZH RX MED GY IP 250 OP 250 PS 637: Performed by: SURGERY

## 2018-06-30 PROCEDURE — 25000128 H RX IP 250 OP 636: Performed by: PHYSICIAN ASSISTANT

## 2018-06-30 RX ORDER — METRONIDAZOLE 250 MG/1
250 TABLET ORAL EVERY 8 HOURS SCHEDULED
Status: DISCONTINUED | OUTPATIENT
Start: 2018-06-30 | End: 2018-07-02 | Stop reason: HOSPADM

## 2018-06-30 RX ORDER — METRONIDAZOLE 250 MG/1
250 TABLET ORAL EVERY 8 HOURS SCHEDULED
Status: DISCONTINUED | OUTPATIENT
Start: 2018-06-30 | End: 2018-06-30

## 2018-06-30 RX ORDER — CEFPODOXIME PROXETIL 200 MG/1
200 TABLET, FILM COATED ORAL
Status: DISCONTINUED | OUTPATIENT
Start: 2018-06-30 | End: 2018-07-02 | Stop reason: HOSPADM

## 2018-06-30 RX ADMIN — HYDRALAZINE HYDROCHLORIDE 100 MG: 100 TABLET ORAL at 00:04

## 2018-06-30 RX ADMIN — CEFPODOXIME PROXETIL 200 MG: 200 TABLET, FILM COATED ORAL at 17:37

## 2018-06-30 RX ADMIN — Medication 1 MG: at 22:59

## 2018-06-30 RX ADMIN — Medication: at 08:41

## 2018-06-30 RX ADMIN — NYSTATIN 1000000 UNITS: 100000 SUSPENSION ORAL at 17:38

## 2018-06-30 RX ADMIN — TRAMADOL HYDROCHLORIDE 50 MG: 50 TABLET, COATED ORAL at 08:16

## 2018-06-30 RX ADMIN — METRONIDAZOLE 250 MG: 250 TABLET ORAL at 22:58

## 2018-06-30 RX ADMIN — TERBUTALINE SULFATE 5 MG: 5 TABLET ORAL at 00:03

## 2018-06-30 RX ADMIN — TERBUTALINE SULFATE 5 MG: 5 TABLET ORAL at 17:45

## 2018-06-30 RX ADMIN — SODIUM CHLORIDE 250 ML: 9 INJECTION, SOLUTION INTRAVENOUS at 08:41

## 2018-06-30 RX ADMIN — METRONIDAZOLE 250 MG: 250 TABLET ORAL at 17:45

## 2018-06-30 RX ADMIN — APIXABAN 2.5 MG: 2.5 TABLET, FILM COATED ORAL at 20:16

## 2018-06-30 RX ADMIN — HYDRALAZINE HYDROCHLORIDE 100 MG: 100 TABLET ORAL at 20:31

## 2018-06-30 RX ADMIN — ACETAMINOPHEN 650 MG: 325 TABLET, FILM COATED ORAL at 04:04

## 2018-06-30 RX ADMIN — MYCOPHENOLATE MOFETIL 1500 MG: 250 CAPSULE ORAL at 17:37

## 2018-06-30 RX ADMIN — Medication 1 HALF-TAB: at 20:23

## 2018-06-30 RX ADMIN — TERBUTALINE SULFATE 5 MG: 5 TABLET ORAL at 08:05

## 2018-06-30 RX ADMIN — TACROLIMUS 3 MG: 1 CAPSULE ORAL at 08:23

## 2018-06-30 RX ADMIN — Medication 1 CAPSULE: at 20:18

## 2018-06-30 RX ADMIN — PREDNISONE 20 MG: 20 TABLET ORAL at 08:06

## 2018-06-30 RX ADMIN — METRONIDAZOLE 500 MG: 500 INJECTION, SOLUTION INTRAVENOUS at 13:20

## 2018-06-30 RX ADMIN — MYCOPHENOLATE MOFETIL 1500 MG: 250 CAPSULE ORAL at 08:04

## 2018-06-30 RX ADMIN — HYDRALAZINE HYDROCHLORIDE 100 MG: 100 TABLET ORAL at 06:00

## 2018-06-30 RX ADMIN — NYSTATIN 1000000 UNITS: 100000 SUSPENSION ORAL at 22:59

## 2018-06-30 RX ADMIN — EPOETIN ALFA 4000 UNITS: 10000 SOLUTION INTRAVENOUS; SUBCUTANEOUS at 11:53

## 2018-06-30 RX ADMIN — HYDRALAZINE HYDROCHLORIDE 100 MG: 100 TABLET ORAL at 13:23

## 2018-06-30 RX ADMIN — TERBUTALINE SULFATE 5 MG: 5 TABLET ORAL at 23:06

## 2018-06-30 RX ADMIN — APIXABAN 2.5 MG: 2.5 TABLET, FILM COATED ORAL at 08:05

## 2018-06-30 RX ADMIN — PRAVASTATIN SODIUM 40 MG: 40 TABLET ORAL at 20:17

## 2018-06-30 RX ADMIN — ASPIRIN 81 MG: 81 TABLET, COATED ORAL at 08:05

## 2018-06-30 RX ADMIN — PANTOPRAZOLE SODIUM 40 MG: 40 TABLET, DELAYED RELEASE ORAL at 08:05

## 2018-06-30 RX ADMIN — SENNOSIDES AND DOCUSATE SODIUM 1 TABLET: 8.6; 5 TABLET ORAL at 08:05

## 2018-06-30 RX ADMIN — NYSTATIN 1000000 UNITS: 100000 SUSPENSION ORAL at 13:21

## 2018-06-30 RX ADMIN — PREDNISONE 15 MG: 5 TABLET ORAL at 20:16

## 2018-06-30 RX ADMIN — TACROLIMUS 2.5 MG: 1 CAPSULE ORAL at 17:37

## 2018-06-30 RX ADMIN — NYSTATIN 1000000 UNITS: 100000 SUSPENSION ORAL at 08:04

## 2018-06-30 RX ADMIN — METRONIDAZOLE 500 MG: 500 INJECTION, SOLUTION INTRAVENOUS at 00:04

## 2018-06-30 RX ADMIN — SODIUM CHLORIDE 300 ML: 9 INJECTION, SOLUTION INTRAVENOUS at 08:41

## 2018-06-30 RX ADMIN — VANCOMYCIN HYDROCHLORIDE 750 MG: 10 INJECTION, POWDER, LYOPHILIZED, FOR SOLUTION INTRAVENOUS at 17:38

## 2018-06-30 ASSESSMENT — PAIN DESCRIPTION - DESCRIPTORS
DESCRIPTORS: HEADACHE
DESCRIPTORS: HEADACHE
DESCRIPTORS: ACHING

## 2018-06-30 NOTE — PLAN OF CARE
Problem: Patient Care Overview  Goal: Plan of Care/Patient Progress Review  RN:  1.Pt will remain hemodynamically stable.  2.BG WNL.   Outcome: Improving  AVSS.  Tylenol/Ultram/cold pack for HA.  's.  Insulin per orders.  Pt pleasant, stable without distress. Up independently.  Rhtyhm: SR .  Immunosuppressants per orders, follow drug levels, bx results.  HD as scheduled.  Next bx planned for 7/4.  Continue to monitor s/p heart transplant 6/15/2018.  Notify MD w/ any change in status.

## 2018-06-30 NOTE — PROGRESS NOTES
CVTS Daily Note  6/30/2018  Attending: Rony Caputo,*    S:   No overnight events. Feeling good this AM.   Pt seen at bedside resting comfortably.    Does complain of some headaches at times, otherwise no acute complaints.      Denies F/C/Sweats.  No CP, SOB, or calf pain.    Tolerating diet.  + BM.  + Flatus.    Ambulated well without assistance.    Pain level tolerable. Plan as per Neuro section below.     O:   Vitals:    06/30/18 1220 06/30/18 1225 06/30/18 1300 06/30/18 1322   BP: 90/61 96/67  115/80   BP Location:    Right arm   Cuff Size:       Pulse:       Resp: 16 18 18   Temp: 97  F (36.1  C) 98.2  F (36.8  C)  97.7  F (36.5  C)   TempSrc: Axillary Oral     SpO2: 100% 100%     Weight:   76.5 kg (168 lb 9.6 oz)    Height:         Vitals:    06/28/18 0624 06/30/18 0600 06/30/18 1300   Weight: 78.2 kg (172 lb 6.4 oz) 78.7 kg (173 lb 6.4 oz) 76.5 kg (168 lb 9.6 oz)       Intake/Output Summary (Last 24 hours) at 06/30/18 1418  Last data filed at 06/30/18 1220   Gross per 24 hour   Intake              360 ml   Output             3000 ml   Net            -2640 ml       MAPs: 87 - 108  Gen: AAO x 3, pleasant, NAD  CV: RRR, S1S2 normal, no murmurs, rubs, or gallops.   Pulm: CTA, no rhonchi or wheezes  Abd: soft, non-tender, no guarding  Ext: no peripheral edema  Incision: clean, dry, intact, no erythema  Chest Tube sites: dressings clean and dry.       Labs:  [facility]    Recent Labs  Lab 06/30/18  0548 06/29/18  0545 06/28/18  0554 06/27/18  0629    136 133 135   POTASSIUM 4.7 4.6 4.6 4.3   CHLORIDE 99 101 98 99   TRINI 8.6 8.6 8.3* 8.3*   CO2 18* 23 17* 22   BUN 57* 38* 64* 51*   CR 6.27* 4.77* 6.98* 4.95*   * 210* 290* 200*     CBC    Recent Labs  Lab 06/30/18  0548 06/29/18  0545 06/28/18  0554 06/27/18  1529 06/27/18  0629   WBC 12.5* 11.4* 13.8*  --  14.7*   RBC 2.98* 2.98* 3.13*  --  3.00*   HGB 8.6* 8.7* 9.1* 9.4* 8.7*   HCT 26.8* 26.2* 27.5*  --  26.7*   MCV 90 88 88  --  89   MCH 28.9 29.2  29.1  --  29.0   MCHC 32.1 33.2 33.1  --  32.6   RDW 17.9* 18.2* 18.0*  --  17.7*    227 256  --  208      Hepatic Panel   Lab Results   Component Value Date    AST 13 06/23/2018     Lab Results   Component Value Date    ALT 26 06/23/2018     Lab Results   Component Value Date    ALBUMIN 3.1 06/23/2018     GLUCOSE:     Recent Labs  Lab 06/30/18  0815 06/30/18  0548 06/30/18  0237 06/29/18  2117 06/29/18  1854 06/29/18  1410 06/29/18  1219  06/29/18  0545  06/28/18  0554  06/27/18  0629  06/26/18  0433  06/25/18  0509   GLC  --  207*  --   --   --   --   --   --  210*  --  290*  --  200*  --  268*  --  300*   *  --  203* 215* 236* 146* 121*  < >  --   < >  --   < >  --   < >  --   < >  --    < > = values in this interval not displayed.      Imaging:  reviewed recent imaging      ASSESSMENT/PLAN: Patient is a 63 year old male with a history of CAD, ICM (EF of 15-20%), ESRD on HD, bicuspid aortic valve with AI, severe MR, and proximal AAA who was admitted for decompensated heart failure 4/16/18 now s/p heart transplant on 6/14/18. 6/20 biopsy showed 1R cellular rejection.  RHC and biopsy 6/21; 1R cellular and no antibody rejection   RHC and biopsy 6/27; 1R cellular and no antibody rejection      Neuro:  -Acute post-op pain: tylenol and ultram prn     CV:  - ASA 81 mg, pravastatin.  - Blood pressure (goal SBP less than 130 given aortic aneurysm): borderline controlled, on hydralazine 100 mg qid, isordil to 10 mg q8h 6/26  - TPW have been removed  - RHC and biopsy 6/27; 1R cellular and no antibody rejection   - was started on lopressor due to afib (by cardiology), discontinued after d/w Dr Caputo      Resp:   - Extubated POD 2  - IS, encourage activity  - All chest tubes have been removed, CXR with stable pneumoperitoneum (likely from chest tube removal). Has LLL opacity, stable, CT chest 6/26 unremarkable      FEN/GI:   - regular diet, hold bowel regimen, Multiple BM with trace blood on toilet paper,  repeating abdominal film today. No abdominal distention or tenderness, general surgery following given pneumoperitonium that has been stable, CT abdomin without bowel inflammation     Renal:   - ESRD on dialsysis, on iHD, scheduled 6/26  - Volume status: dry to slightly hypervolemic, pulling fluid with HD runs      ID: Completed daniella-op abx,   - WBC trending down, afebrile, Is having some loose stools, c.diff negative 6/25, UA unremarkable, CXR with stable LLL opacity, will start vanc/cefipime (6/26), ID consult, recommended addition of IV flagyl. CT c/a/p with and without contrast unremarkable.   - ID Recommendations: Vanco until 7/10, start PO flagyl, start PO Cefpodoxime after HD on Saturday 6/30/2018, Tuesday 7/3/2018 and Thursday 7/5/2018 then DC (total of 10 days).    - Immunosuppression per cards     Heme:   - Hgb 8.7, stable.       Endo:   - BG consistently above 200, on SSI started lantus 8U 6/25  - Consult endocrine       PPx:   - PPI      Anticoagulation:   - Apixaban for right IJ thrombus       Dispo:   - 6C since 6/17  - looking at possible discharge Monday/Tuesday      Discussed with CVTS Fellow   Staff surgeons have been informed of changes through both  verbal and written communication.      Anmol Deal PA-C  Cardiothoracic Surgery  Pager [phone]    2:18 PM   June 30, 2018

## 2018-06-30 NOTE — PROGRESS NOTES
"      Advanced Heart Failure and Transplant Cardiology  Consult Note    Assessment and Plan:  63 year old male with a PMH of CAD, ICM (EF of 15-20%), ESRD, bicuspid aortic valve with AI, severe MR, and proximal AAA who was admitted mid-April for decompensated heart failure. He underwent OHT on 6/15/2018.        # Immunosuppression:   - Mycophenolate 1500 mg BID, prednisone 20 mg / 15 mg, Tacrolimus 3 mg / 2.5 mg   - goal Tacro 10-12 ug/L  - last biopsy: 6/27: 1R  - next biopsy Thursday 7/5    # Prophylaxis: CMV (- / +), EBV (+ / pending):   - Nystatin, Valcyte, Bactrim, asa 81 mg daily, Pravastatin 40 mg daily    WBC remains normal or slightly elevated. IV antibiotics continued. ID also following. Cx's remain negative. Tacrolimus level appears within goal, and thus would check again Monday AM. BP appears much improved. No changes from us.    Recommendations:  - next Tacrolimus level Monday AM  - continue IS and prophylaxis as ordered  - may discharge from our perspective with antimicrobials changed per ID recommendations     Seen and discussed with Dr. Blum.  Sukumar Mejía, CVD Fellow  ================================================================    Interval History   - no acute events  - diarrhea improved  - no abdominal pain, eating well  - remains on IV antibiotics    Physical Exam   Temp: 97.7  F (36.5  C) Temp src: Oral BP: 115/80   Heart Rate: 98 Resp: 18 SpO2: 100 % O2 Device: None (Room air)    Vitals:    06/28/18 0624 06/30/18 0600 06/30/18 1300   Weight: 78.2 kg (172 lb 6.4 oz) 78.7 kg (173 lb 6.4 oz) 76.5 kg (168 lb 9.6 oz)     Heart Rate: 98, Blood pressure 115/80, pulse 104, temperature 97.7  F (36.5  C), resp. rate 18, height 1.727 m (5' 8\"), weight 76.5 kg (168 lb 9.6 oz), SpO2 100 %.  168 lbs 9.6 oz  GEN: NAD  CV: tachycardic, no murmur, no S3 or S4  LUNGS:  Lungs with decreased breath sounds  ABD: Active bowel sounds, soft, non-tender/non-distended.  No rebound/guarding/rigidity.  EXT: No edema " or cyanosis.      Medications:  - reviewed in Epic    Data:  - all labs and imaging reviewed

## 2018-06-30 NOTE — PLAN OF CARE
Problem: Patient Care Overview  Goal: Plan of Care/Patient Progress Review  RN:  1.Pt will remain hemodynamically stable.  2.BG WNL.   VSS on RA. C/o headache in the morning, ultram x1. . Dialysis today w/ 3kg removed. Up independently and able to make needs known. 1x soft loose stool. BG monitoring and correction. Will continue POC.

## 2018-06-30 NOTE — PHARMACY-VANCOMYCIN DOSING SERVICE
Pharmacy Vancomycin Note  Date of Service 2018  Patient's  1955   63 year old, male    Indication: possible post-op infection  Goal Trough Level: 15-20 mg/L  Day of Therapy: 5  Current Vancomycin regimen: intermittent dosing based on levels.     Current estimated CrCl = Estimated Creatinine Clearance: 13.4 mL/min (based on Cr of 6.27).    Creatinine for last 3 days  2018:  5:54 AM Creatinine 6.98 mg/dL  2018:  5:45 AM Creatinine 4.77 mg/dL  2018:  5:48 AM Creatinine 6.27 mg/dL    Recent Vancomycin Levels (past 3 days)  2018:  5:54 AM Vancomycin Level 22.8 mg/L  2018:  5:48 AM Vancomycin Level 24.8 mg/L    Vancomycin IV Administrations (past 72 hours)                   vancomycin (VANCOCIN) 750 mg in sodium chloride 0.9 % 250 mL intermittent infusion (mg) 750 mg New Bag 18 1844                Nephrotoxins and other renal medications (Future)    Start     Dose/Rate Route Frequency Ordered Stop    18 1800  vancomycin (VANCOCIN) 750 mg in sodium chloride 0.9 % 250 mL intermittent infusion      750 mg  over 90 Minutes Intravenous ONCE 18 1311      18 0800  tacrolimus (GENERIC EQUIVALENT) capsule 3 mg      3 mg Oral EVERY MORNING. 18 1647      18 1800  tacrolimus (GENERIC EQUIVALENT) capsule 2.5 mg      2.5 mg Oral EVERY EVENING 18 1316      18 0728  vancomycin place bui - receiving intermittent dosing      1 each Does not apply SEE ADMIN INSTRUCTIONS 18 0729               Contrast Orders - past 72 hours     None          Interpretation of levels and current regimen:  Trough level is  Supratherapeutic, but anticipate post HD-level to be in therapeutic range.     Renal Function: ESRD on Dialysis    Plan:  1.  750 mg IV x 1 tonight.  2.  Pharmacy will check trough levels as appropriate in 1-3 Days.      Marnie Brady, PharmD, pager [phone]        .

## 2018-06-30 NOTE — PROGRESS NOTES
HEMODIALYSIS TREATMENT NOTE    Date: 6/30/2018  Time: 12:47 PM    Data:  Pre Wt: 78.7 kg (173 lb 8 oz)   Desired Wt: 75.7 kg   Weight change: - 3 kg  Ultrafiltration - Post Run Net Total Removed (mL): 3000 mL  Ultrafiltration - Post Run Net Total Gain (mL): 0 mL  Vascular Access Status: Yes, secured and visible  Dialyzer Rinse: Streaked, Light  Total Blood Volume Processed: 69.1 Liters  Total Dialysis (Treatment) Time: 3.5 hrs      Interventions/Assessment:  Stable 3.5 hr HD tx via right CVC on K2 Ca2.5 bath. Keep SBP > 100 and MAP > 60 per order. Epogen 4000 units given IV. 3 units Novolog given for  and 7 units Novolog given for carb coverage. Pt had no issues or complaints during tx. 3.0 kg removed and CVC saline locked. Report given to primary RN.     Plan:    Next HD tx per renal team.

## 2018-06-30 NOTE — PROGRESS NOTES
Diabetes Consult Daily  Progress Note          Assessment/Plan:    Murray Nicholson is a 63 year old male with history of type 2 diabetes, hypertension, hyperlipidemia, ESRD ( HD, TTS), ICM ( EF 10 to 15%) due to valvular heart disease who was admitted for decompensated heart failure (4/16/2018) now s/p orthotopic heart transplant (6/14/2018).     Type 2 diabetes: PTA glipizide 5 mg   Prednisone 20 mg AM and 15 mg in PM    BG in target range after dialysis yesterday on 15 units lantus and Prednisone 20 mg AM/15 mg PM (day 3 today) white blood count WNL today C.Diff related diarrhea continues but abating.     Plan  -continue  glargine  15 units every 24 hours ( 0800)  -continue Prandial insulin Novolog  1 unit per 10 grams  of CHO for meals and snacks  Continue Aspart high intensity sliding scale before meals and HS  -monitor glucose before meals HS and 0200  -Recommend starting IV insulin if glucose > 350 x 2     Will continue to follow.   Outpatient diabetes follow up: TBD, possible discharge on Monday  Plan discussed with patient.               Interval History:   The last 24 hours progress and nursing notes reviewed.     Patient was not in room today. Charts reviewed    06/29 BG in target range after HD yesterday on 15 units lantus and pred 20 AM/ 15 mg PM, day#2 of this dose     Blood sugars improved 06/26 with increase of lantus to 15 units and increased prandial and increased correction intensity . 06/27 was NPO for most of day for heart cath     heart Cath results 06/27 pending, WBC elevated today  HD scheduled for T, Th , Sat  Possible Discharge on Monday        Recent Labs  Lab 06/30/18  0815 06/30/18  0548 06/30/18  0237 06/29/18  2117 06/29/18  1854 06/29/18  1410 06/29/18  1219  06/29/18  0545  06/28/18  0554  06/27/18  0629  06/26/18  0433  06/25/18  0509   GLC  --  207*  --   --   --   --   --   --  210*  --  290*  --  200*  --  268*  --  300*   *  --  203* 215* 236* 146* 121*   "< >  --   < >  --   < >  --   < >  --   < >  --    < > = values in this interval not displayed.            Review of Systems:   See interval hx          Medications:       Active Diet Order      Regular Diet Adult Thin Liquids (water, ice chips, juice, milk, gelatin, ice cream, etc)     Physical Exam:  Gen: Alert, resting in bed, in NAD   HEENT: mucous membranes are moist  Resp: non-labored  Ext: moving all extemiteis  Neuro:oriented x3, communicating clearly  /82 (BP Location: Right arm)  Pulse 104  Temp 98.1  F (36.7  C) (Oral)  Resp 16  Ht 1.727 m (5' 8\")  Wt 78.7 kg (173 lb 6.4 oz)  SpO2 97%  BMI 26.37 kg/m2           Data:     Lab Results   Component Value Date    A1C 7.0 04/26/2018    A1C 6.3 04/04/2018    A1C 8.6 02/02/2018    A1C 9.1 01/08/2018    A1C 6.4 06/27/2017              CBC RESULTS:   Recent Labs   Lab Test  06/26/18   0433   WBC  21.7*   RBC  3.49*   HGB  10.3*   HCT  30.0*   MCV  86   MCH  29.5   MCHC  34.3   RDW  17.4*   PLT  301     Recent Labs   Lab Test  06/26/18   0433  06/25/18   0509   NA  129*  129*   POTASSIUM  5.7*  5.0   CHLORIDE  92*  93*   CO2  18*  19*   ANIONGAP  18*  18*   GLC  268*  300*   BUN  109*  82*   CR  8.00*  6.50*   TRINI  9.2  8.8     Liver Function Studies -   Recent Labs   Lab Test  06/23/18   2126   PROTTOTAL  7.0   ALBUMIN  3.1*   BILITOTAL  0.6   ALKPHOS  136   AST  13   ALT  26     Lab Results   Component Value Date    INR 1.07 06/26/2018    INR 1.07 06/25/2018    INR 1.07 06/24/2018    INR 1.07 06/23/2018    INR 1.09 06/22/2018    INR 1.04 06/21/2018    INR 1.04 06/20/2018    INR 1.01 06/19/2018    INR 1.02 06/18/2018    INR 1.03 06/17/2018    INR 1.16 06/16/2018    INR 1.42 06/15/2018               "

## 2018-07-01 LAB
ANION GAP SERPL CALCULATED.3IONS-SCNC: 14 MMOL/L (ref 3–14)
BUN SERPL-MCNC: 40 MG/DL (ref 7–30)
CALCIUM SERPL-MCNC: 8.4 MG/DL (ref 8.5–10.1)
CHLORIDE SERPL-SCNC: 97 MMOL/L (ref 94–109)
CO2 SERPL-SCNC: 21 MMOL/L (ref 20–32)
CREAT SERPL-MCNC: 4.64 MG/DL (ref 0.66–1.25)
ERYTHROCYTE [DISTWIDTH] IN BLOOD BY AUTOMATED COUNT: 18 % (ref 10–15)
GFR SERPL CREATININE-BSD FRML MDRD: 13 ML/MIN/1.7M2
GLUCOSE BLDC GLUCOMTR-MCNC: 210 MG/DL (ref 70–99)
GLUCOSE BLDC GLUCOMTR-MCNC: 228 MG/DL (ref 70–99)
GLUCOSE BLDC GLUCOMTR-MCNC: 256 MG/DL (ref 70–99)
GLUCOSE BLDC GLUCOMTR-MCNC: 262 MG/DL (ref 70–99)
GLUCOSE SERPL-MCNC: 284 MG/DL (ref 70–99)
HCT VFR BLD AUTO: 25.5 % (ref 40–53)
HGB BLD-MCNC: 8.4 G/DL (ref 13.3–17.7)
MCH RBC QN AUTO: 28.7 PG (ref 26.5–33)
MCHC RBC AUTO-ENTMCNC: 32.9 G/DL (ref 31.5–36.5)
MCV RBC AUTO: 87 FL (ref 78–100)
PLATELET # BLD AUTO: 214 10E9/L (ref 150–450)
POTASSIUM SERPL-SCNC: 4.6 MMOL/L (ref 3.4–5.3)
PROCALCITONIN SERPL-MCNC: 1.25 NG/ML
RBC # BLD AUTO: 2.93 10E12/L (ref 4.4–5.9)
SODIUM SERPL-SCNC: 131 MMOL/L (ref 133–144)
WBC # BLD AUTO: 9.6 10E9/L (ref 4–11)

## 2018-07-01 PROCEDURE — 25000132 ZZH RX MED GY IP 250 OP 250 PS 637: Performed by: STUDENT IN AN ORGANIZED HEALTH CARE EDUCATION/TRAINING PROGRAM

## 2018-07-01 PROCEDURE — 36592 COLLECT BLOOD FROM PICC: CPT | Performed by: PHYSICIAN ASSISTANT

## 2018-07-01 PROCEDURE — 00000146 ZZHCL STATISTIC GLUCOSE BY METER IP

## 2018-07-01 PROCEDURE — 25000131 ZZH RX MED GY IP 250 OP 636 PS 637: Performed by: STUDENT IN AN ORGANIZED HEALTH CARE EDUCATION/TRAINING PROGRAM

## 2018-07-01 PROCEDURE — 25000132 ZZH RX MED GY IP 250 OP 250 PS 637: Performed by: PHYSICIAN ASSISTANT

## 2018-07-01 PROCEDURE — 25000131 ZZH RX MED GY IP 250 OP 636 PS 637: Performed by: INTERNAL MEDICINE

## 2018-07-01 PROCEDURE — 25000132 ZZH RX MED GY IP 250 OP 250 PS 637: Performed by: THORACIC SURGERY (CARDIOTHORACIC VASCULAR SURGERY)

## 2018-07-01 PROCEDURE — 25000125 ZZHC RX 250: Performed by: STUDENT IN AN ORGANIZED HEALTH CARE EDUCATION/TRAINING PROGRAM

## 2018-07-01 PROCEDURE — 80048 BASIC METABOLIC PNL TOTAL CA: CPT | Performed by: PHYSICIAN ASSISTANT

## 2018-07-01 PROCEDURE — 84145 PROCALCITONIN (PCT): CPT | Performed by: PHYSICIAN ASSISTANT

## 2018-07-01 PROCEDURE — 21400006 ZZH R&B CCU INTERMEDIATE UMMC

## 2018-07-01 PROCEDURE — 85027 COMPLETE CBC AUTOMATED: CPT | Performed by: PHYSICIAN ASSISTANT

## 2018-07-01 PROCEDURE — 99232 SBSQ HOSP IP/OBS MODERATE 35: CPT | Mod: GC | Performed by: INTERNAL MEDICINE

## 2018-07-01 PROCEDURE — 25000132 ZZH RX MED GY IP 250 OP 250 PS 637: Performed by: SURGERY

## 2018-07-01 RX ADMIN — NYSTATIN 1000000 UNITS: 100000 SUSPENSION ORAL at 11:12

## 2018-07-01 RX ADMIN — HYDRALAZINE HYDROCHLORIDE 100 MG: 100 TABLET ORAL at 04:30

## 2018-07-01 RX ADMIN — METRONIDAZOLE 250 MG: 250 TABLET ORAL at 14:04

## 2018-07-01 RX ADMIN — PANTOPRAZOLE SODIUM 40 MG: 40 TABLET, DELAYED RELEASE ORAL at 07:47

## 2018-07-01 RX ADMIN — NYSTATIN 1000000 UNITS: 100000 SUSPENSION ORAL at 22:24

## 2018-07-01 RX ADMIN — METRONIDAZOLE 250 MG: 250 TABLET ORAL at 06:33

## 2018-07-01 RX ADMIN — Medication 1 CAPSULE: at 20:28

## 2018-07-01 RX ADMIN — ACETAMINOPHEN 650 MG: 325 TABLET, FILM COATED ORAL at 04:37

## 2018-07-01 RX ADMIN — NYSTATIN 1000000 UNITS: 100000 SUSPENSION ORAL at 18:12

## 2018-07-01 RX ADMIN — TERBUTALINE SULFATE 5 MG: 5 TABLET ORAL at 15:51

## 2018-07-01 RX ADMIN — PREDNISONE 20 MG: 20 TABLET ORAL at 07:47

## 2018-07-01 RX ADMIN — ASPIRIN 81 MG: 81 TABLET, COATED ORAL at 07:48

## 2018-07-01 RX ADMIN — METRONIDAZOLE 250 MG: 250 TABLET ORAL at 22:25

## 2018-07-01 RX ADMIN — APIXABAN 2.5 MG: 2.5 TABLET, FILM COATED ORAL at 20:28

## 2018-07-01 RX ADMIN — TACROLIMUS 3 MG: 1 CAPSULE ORAL at 07:47

## 2018-07-01 RX ADMIN — INSULIN HUMAN 6 UNITS: 100 INJECTION, SUSPENSION SUBCUTANEOUS at 12:02

## 2018-07-01 RX ADMIN — HYDRALAZINE HYDROCHLORIDE 100 MG: 100 TABLET ORAL at 11:12

## 2018-07-01 RX ADMIN — NYSTATIN 1000000 UNITS: 100000 SUSPENSION ORAL at 07:48

## 2018-07-01 RX ADMIN — Medication 1 HALF-TAB: at 22:24

## 2018-07-01 RX ADMIN — ACETAMINOPHEN 650 MG: 325 TABLET, FILM COATED ORAL at 19:04

## 2018-07-01 RX ADMIN — HYDRALAZINE HYDROCHLORIDE 100 MG: 100 TABLET ORAL at 22:23

## 2018-07-01 RX ADMIN — MYCOPHENOLATE MOFETIL 1500 MG: 250 CAPSULE ORAL at 07:46

## 2018-07-01 RX ADMIN — TERBUTALINE SULFATE 5 MG: 5 TABLET ORAL at 23:02

## 2018-07-01 RX ADMIN — TACROLIMUS 2.5 MG: 1 CAPSULE ORAL at 18:11

## 2018-07-01 RX ADMIN — PRAVASTATIN SODIUM 40 MG: 40 TABLET ORAL at 20:28

## 2018-07-01 RX ADMIN — HYDRALAZINE HYDROCHLORIDE 100 MG: 100 TABLET ORAL at 15:51

## 2018-07-01 RX ADMIN — PREDNISONE 15 MG: 5 TABLET ORAL at 20:28

## 2018-07-01 RX ADMIN — Medication 1 MG: at 23:02

## 2018-07-01 RX ADMIN — MYCOPHENOLATE MOFETIL 1500 MG: 250 CAPSULE ORAL at 18:12

## 2018-07-01 RX ADMIN — APIXABAN 2.5 MG: 2.5 TABLET, FILM COATED ORAL at 07:48

## 2018-07-01 RX ADMIN — TERBUTALINE SULFATE 5 MG: 5 TABLET ORAL at 07:47

## 2018-07-01 NOTE — PROGRESS NOTES
Diabetes Consult Daily  Progress Note          Assessment/Plan:    Murray Nicholson is a 63 year old male with history of type 2 diabetes, hypertension, hyperlipidemia, ESRD ( HD, TTS), ICM ( EF 10 to 15%) due to valvular heart disease who was admitted for decompensated heart failure (4/16/2018) now s/p orthotopic heart transplant (6/14/2018).     Type 2 diabetes: PTA glipizide 5 mg   Prednisone 20 mg AM and 15 mg in PM    BG in target range after dialysis yesterday on 15 units lantus and Prednisone 20 mg AM/15 mg PM (day 3 today) white blood count WNL today C.Diff related diarrhea continues but abating.     Plan  -Discontinue  Glargine.   -Tonight use 6 units of NPH with Prednisone at 8 pm,  - Tomorrow AM, use 20 units of NPH with AM prednisone.   - Based on the overnight BG levels and fasting value increase dose of NPH with consideration that he has glargine on board.  - We will consider lowering NPH by 25% approx on HD days.      -continue Prandial insulin Novolog  1 unit per 10 grams  of CHO for meals and snacks  Continue Aspart high intensity sliding scale before meals and HS  -monitor glucose before meals HS and 0200  -Recommend starting IV insulin if glucose > 350 x 2     Will continue to follow.   Outpatient diabetes follow up: TBD, possible discharge on Monday  Plan discussed with patient.               Interval History:   The last 24 hours progress and nursing notes reviewed.    Appetite better, eating more carbs.   Feeling better and hoping to get discharged soon.   HD scheduled for T, Th , Sat. BG tends to run low on these days.   Possible Discharge on Monday        Recent Labs  Lab 07/01/18  0754 07/01/18  0609 06/30/18  2308 06/30/18  1830 06/30/18  1430 06/30/18  0815 06/30/18  0548 06/30/18  0237  06/29/18  0545  06/28/18  0554  06/27/18  0629  06/26/18  0433   GLC  --  284*  --   --   --   --  207*  --   --  210*  --  290*  --  200*  --  268*   *  --  185* 167* 89 214*  --   "203*  < >  --   < >  --   < >  --   < >  --    < > = values in this interval not displayed.            Review of Systems:   See interval hx          Medications:       Active Diet Order      Regular Diet Adult Thin Liquids (water, ice chips, juice, milk, gelatin, ice cream, etc)     Physical Exam:  Gen: Alert, resting in bed, in NAD   HEENT: mucous membranes are moist  Resp: non-labored  Ext: moving all extemiteis  Neuro:oriented x3, communicating clearly  Chest clear.   /84  Pulse 101  Temp 98.6  F (37  C) (Oral)  Resp 16  Ht 1.727 m (5' 8\")  Wt 77.3 kg (170 lb 6.4 oz)  SpO2 100%  BMI 25.91 kg/m2           Data:     Lab Results   Component Value Date    A1C 7.0 04/26/2018    A1C 6.3 04/04/2018    A1C 8.6 02/02/2018    A1C 9.1 01/08/2018    A1C 6.4 06/27/2017              CBC RESULTS:   Recent Labs   Lab Test  06/26/18   0433   WBC  21.7*   RBC  3.49*   HGB  10.3*   HCT  30.0*   MCV  86   MCH  29.5   MCHC  34.3   RDW  17.4*   PLT  301     Recent Labs   Lab Test  06/26/18   0433  06/25/18   0509   NA  129*  129*   POTASSIUM  5.7*  5.0   CHLORIDE  92*  93*   CO2  18*  19*   ANIONGAP  18*  18*   GLC  268*  300*   BUN  109*  82*   CR  8.00*  6.50*   TRINI  9.2  8.8     Liver Function Studies -   Recent Labs   Lab Test  06/23/18   2126   PROTTOTAL  7.0   ALBUMIN  3.1*   BILITOTAL  0.6   ALKPHOS  136   AST  13   ALT  26     "

## 2018-07-01 NOTE — PROGRESS NOTES
Murray Nicholson is a 63 year old year old male with PMH of HTN, DMII, functionally bicuspid aortic valve, CHF/CM (EF 10-15%), ESRD, admitted with worsening dyspnea/SOB, now S/P heart tx 6/14 and started on CRRT -> HD, Nephrology managing HD.     This patient was seen and examined while on dialysis. Professional oversight of the patient's dialysis care, access care and dialysis related co-morbidities were addressed as necessary with the patient and / or staff.   Stable 3.5 hr HD tx via right CVC on K2 Ca2.5 bath  3L UF removed.   No issues with dialysis.     BP -130/70  CTA  S1+s2  Trace edema    Recent Labs   Lab Test  06/30/18   0548   NA  134   POTASSIUM  4.7   CHLORIDE  99   CO2  18*   ANIONGAP  16*   GLC  207*   BUN  57*   CR  6.27*   TRINI  8.6       A/P   Stable dialysis.   Next diaysis on Tuesday .     David Fried MD   6/30/18

## 2018-07-01 NOTE — PROGRESS NOTES
CVTS Daily Note  7/1/2018  Attending: Rony Caputo,*    S:   No overnight events.   Pt seen at bedside resting comfortably.    No acute complaints.      Denies F/C/Sweats.  No CP, SOB, or calf pain.    Tolerating diet.  + BM.  + Flatus.    Ambulated well without assistance.    Pain level tolerable. Plan as per Neuro section below.     O:   Vitals:    07/01/18 0430 07/01/18 0600 07/01/18 0635 07/01/18 0845   BP: 146/87  124/80    BP Location: Right arm  Right arm    Cuff Size:       Pulse:       Resp: 20  18    Temp: 98.2  F (36.8  C)   97.7  F (36.5  C)   TempSrc: Oral      SpO2: 100%  100%    Weight:  77.3 kg (170 lb 6.4 oz)     Height:         Vitals:    06/30/18 0600 06/30/18 1300 07/01/18 0600   Weight: 78.7 kg (173 lb 6.4 oz) 76.5 kg (168 lb 9.6 oz) 77.3 kg (170 lb 6.4 oz)     Intake/Output Summary (Last 24 hours) at 07/01/18 1108  Last data filed at 07/01/18 0800   Gross per 24 hour   Intake              500 ml   Output             3000 ml   Net            -2500 ml       MAPs: 68 - 108   Gen: AAO x 3, pleasant, NAD  CV: RRR, S1S2 normal, no murmurs, rubs, or gallops.   Pulm: CTA, no rhonchi or wheezes  Abd: soft, non-tender, no guarding  Ext: no peripheral edema  Incision: clean, dry, intact, no erythema  Chest Tube sites: dressings clean and dry    Labs:  BMP    Recent Labs  Lab 07/01/18  0609 06/30/18  0548 06/29/18  0545 06/28/18  0554   * 134 136 133   POTASSIUM 4.6 4.7 4.6 4.6   CHLORIDE 97 99 101 98   TRINI 8.4* 8.6 8.6 8.3*   CO2 21 18* 23 17*   BUN 40* 57* 38* 64*   CR 4.64* 6.27* 4.77* 6.98*   * 207* 210* 290*     CBC    Recent Labs  Lab 07/01/18  0609 06/30/18  0548 06/29/18  0545 06/28/18  0554   WBC 9.6 12.5* 11.4* 13.8*   RBC 2.93* 2.98* 2.98* 3.13*   HGB 8.4* 8.6* 8.7* 9.1*   HCT 25.5* 26.8* 26.2* 27.5*   MCV 87 90 88 88   MCH 28.7 28.9 29.2 29.1   MCHC 32.9 32.1 33.2 33.1   RDW 18.0* 17.9* 18.2* 18.0*    219 227 256     INR    Recent Labs  Lab 06/28/18  0554  06/27/18  0629 06/26/18  0433 06/25/18  0509   INR 1.15* 1.10 1.07 1.07      Hepatic Panel   Lab Results   Component Value Date    AST 13 06/23/2018     Lab Results   Component Value Date    ALT 26 06/23/2018     Lab Results   Component Value Date    ALBUMIN 3.1 06/23/2018     GLUCOSE:     Recent Labs  Lab 07/01/18  0754 07/01/18  0609 06/30/18  2308 06/30/18  1830 06/30/18  1430 06/30/18  0815 06/30/18  0548 06/30/18  0237  06/29/18  0545  06/28/18  0554  06/27/18  0629  06/26/18  0433   GLC  --  284*  --   --   --   --  207*  --   --  210*  --  290*  --  200*  --  268*   *  --  185* 167* 89 214*  --  203*  < >  --   < >  --   < >  --   < >  --    < > = values in this interval not displayed.      Imaging:  reviewed recent imaging      ASSESSMENT/PLAN: Patient is a 63 year old male with a history of CAD, ICM (EF of 15-20%), ESRD on HD, bicuspid aortic valve with AI, severe MR, and proximal AAA who was admitted for decompensated heart failure 4/16/18 now s/p heart transplant on 6/14/18. 6/20 biopsy showed 1R cellular rejection.  RHC and biopsy 6/21; 1R cellular and no antibody rejection   RHC and biopsy 6/27; 1R cellular and no antibody rejection       Neuro:  -Acute post-op pain: tylenol and ultram prn      CV:  - ASA 81 mg, pravastatin.  - Blood pressure (goal SBP less than 130 given aortic aneurysm): borderline controlled, on hydralazine 100 mg qid, isordil to 10 mg q8h 6/26  - TPW have been removed  - RHC and biopsy 6/27; 1R cellular and no antibody rejection   - was started on lopressor due to afib (by cardiology), discontinued after d/w Dr Caputo       Resp:   - Extubated POD 2  - IS, encourage activity  - All chest tubes have been removed, CXR with stable pneumoperitoneum (likely from chest tube removal). Has LLL opacity, stable, CT chest 6/26 unremarkable      FEN/GI:   - regular diet, hold bowel regimen, Multiple BM with trace blood on toilet paper, repeating abdominal film today. No abdominal  distention or tenderness, general surgery following given pneumoperitonium that has been stable, CT abdomin without bowel inflammation      Renal:   - ESRD on dialsysis, on iHD, scheduled 6/26  - Volume status: dry to slightly hypervolemic, pulling fluid with HD runs      ID: Completed daniella-op abx,   - WBC trending down, afebrile, Is having some loose stools, c.diff negative 6/25, UA unremarkable, CXR with stable LLL opacity, will start vanc/cefipime (6/26), ID consult, recommended addition of IV flagyl. CT c/a/p with and without contrast unremarkable.   - ID Recommendations: Vanco until 7/10, start PO flagyl, start PO Cefpodoxime PO after HD on Saturday 6/30/2018, Tuesday 7/3/2018 and Thursday 7/5/2018 then DC (total of 10 days).    - Immunosuppression per cards      Heme:   - Hgb 8's, stable.       Endo:   - BG consistently above 200, on SSI started lantus 8U 6/25  - Endocrine following       PPx:   - PPI      Anticoagulation:   - Apixaban for right IJ thrombus       Dispo:   - 6C since 6/17  - looking at possible discharge Monday/Tuesday with IV Vanco dosing needed         Discussed with CVTS Fellow   Staff surgeons have been informed of changes through both  verbal and written communication.      Anmol Deal PA-C  Cardiothoracic Surgery  Pager [phone]    11:08 AM   July 1, 2018

## 2018-07-01 NOTE — PROGRESS NOTES
"      Advanced Heart Failure and Transplant Cardiology  Consult Note    Assessment and Plan:  63 year old male with a PMH of CAD, ICM (EF of 15-20%), ESRD, bicuspid aortic valve with AI, severe MR, and proximal AAA who was admitted mid-April for decompensated heart failure. He underwent OHT on 6/15/2018.        # Immunosuppression:   - Mycophenolate 1500 mg BID, prednisone 20 mg / 15 mg, Tacrolimus 3 mg / 2.5 mg   - Tacrolimus: goal 10-12 ug/L    # Prophylaxis: CMV (- / +), EBV (+ / pending):   - Candida: Nystatin  - CMV (-/+): Valcyte  - PCP: Bactrim  - Vasculopathy: asa 81 mg daily, Pravastatin 40 mg daily  - Biopsy: next 7/5/2018    Awaiting approval for home Vancomycin. Otherwise, no clinical change.    Recommendations:  - next Tacrolimus level Monday AM  - continue IS and prophylaxis as ordered  - may discharge from our perspective     Seen and discussed with Dr. Blum.  Sukumar Mejía, CVD Fellow  ================================================================    Interval History   - no acute events overnight  - ambulating halls without difficulty    Physical Exam   Temp: 98.6  F (37  C) Temp src: Oral BP: 123/84 Pulse: 101 Heart Rate: 105 Resp: 16 SpO2: 100 % O2 Device: None (Room air)    Vitals:    06/30/18 0600 06/30/18 1300 07/01/18 0600   Weight: 78.7 kg (173 lb 6.4 oz) 76.5 kg (168 lb 9.6 oz) 77.3 kg (170 lb 6.4 oz)     Heart Rate: 105, Blood pressure 123/84, pulse 101, temperature 98.6  F (37  C), temperature source Oral, resp. rate 16, height 1.727 m (5' 8\"), weight 77.3 kg (170 lb 6.4 oz), SpO2 100 %.  170 lbs 6.4 oz  GEN: NAD  CV: tachycardic, no murmur, no S3 or S4  LUNGS:  Lungs with decreased breath sounds  ABD: Active bowel sounds, soft, non-tender/non-distended.  No rebound/guarding/rigidity.  EXT: No edema or cyanosis.      Medications:  - reviewed in Epic    Data:  - all labs and imaging reviewed            "

## 2018-07-01 NOTE — PLAN OF CARE
Problem: Goal Outcome Summary  Goal: Goal Outcome Summary  RN  1. Pt will be hemodynamically stable.  2. Pt and family will verbalize understanding of plan of care.  3. Pt will remain free of falls  4. Pain will be under control or minimal    Outcome: Improving    D: Admitted 04/16 for dyspnea. Pt has h/o CHF (EF 10%) s/p OHT on 6/14/2018, severe AI and MR, ESRD on HD and former smoker (quit 1994).  I: Monitored vitals and assessed pt status.  A: A0x4. VSS. Room air. SR/ST. Afebrile. Pt denies pain. Patient sleeping between cares.   P: Will continue to monitor and manage pt per plan of care. Please report any pertinent change (s) in pt's condition.

## 2018-07-02 VITALS
OXYGEN SATURATION: 99 % | DIASTOLIC BLOOD PRESSURE: 51 MMHG | HEIGHT: 68 IN | HEART RATE: 103 BPM | SYSTOLIC BLOOD PRESSURE: 91 MMHG | TEMPERATURE: 98 F | WEIGHT: 172.8 LBS | BODY MASS INDEX: 26.19 KG/M2 | RESPIRATION RATE: 18 BRPM

## 2018-07-02 LAB
ANION GAP SERPL CALCULATED.3IONS-SCNC: 15 MMOL/L (ref 3–14)
BACTERIA SPEC CULT: NO GROWTH
BACTERIA SPEC CULT: NO GROWTH
BUN SERPL-MCNC: 59 MG/DL (ref 7–30)
CALCIUM SERPL-MCNC: 8.4 MG/DL (ref 8.5–10.1)
CHLORIDE SERPL-SCNC: 97 MMOL/L (ref 94–109)
CO2 SERPL-SCNC: 16 MMOL/L (ref 20–32)
CREAT SERPL-MCNC: 6.05 MG/DL (ref 0.66–1.25)
CRP SERPL-MCNC: 7.3 MG/L (ref 0–8)
ERYTHROCYTE [DISTWIDTH] IN BLOOD BY AUTOMATED COUNT: 18 % (ref 10–15)
GFR SERPL CREATININE-BSD FRML MDRD: 9 ML/MIN/1.7M2
GLUCOSE BLDC GLUCOMTR-MCNC: 192 MG/DL (ref 70–99)
GLUCOSE BLDC GLUCOMTR-MCNC: 274 MG/DL (ref 70–99)
GLUCOSE BLDC GLUCOMTR-MCNC: 307 MG/DL (ref 70–99)
GLUCOSE SERPL-MCNC: 178 MG/DL (ref 70–99)
HCT VFR BLD AUTO: 27.3 % (ref 40–53)
HGB BLD-MCNC: 8.8 G/DL (ref 13.3–17.7)
Lab: NORMAL
Lab: NORMAL
MCH RBC QN AUTO: 29 PG (ref 26.5–33)
MCHC RBC AUTO-ENTMCNC: 32.2 G/DL (ref 31.5–36.5)
MCV RBC AUTO: 90 FL (ref 78–100)
PLATELET # BLD AUTO: 188 10E9/L (ref 150–450)
POTASSIUM SERPL-SCNC: 5.2 MMOL/L (ref 3.4–5.3)
RBC # BLD AUTO: 3.03 10E12/L (ref 4.4–5.9)
SODIUM SERPL-SCNC: 128 MMOL/L (ref 133–144)
SPECIMEN SOURCE: NORMAL
SPECIMEN SOURCE: NORMAL
TACROLIMUS BLD-MCNC: 13.3 UG/L (ref 5–15)
TACROLIMUS BLD-MCNC: 77.4 UG/L (ref 5–15)
TME LAST DOSE: ABNORMAL H
TME LAST DOSE: NORMAL H
VANCOMYCIN SERPL-MCNC: 24.7 MG/L
WBC # BLD AUTO: 10.8 10E9/L (ref 4–11)

## 2018-07-02 PROCEDURE — 80197 ASSAY OF TACROLIMUS: CPT | Performed by: INTERNAL MEDICINE

## 2018-07-02 PROCEDURE — 25000128 H RX IP 250 OP 636: Performed by: THORACIC SURGERY (CARDIOTHORACIC VASCULAR SURGERY)

## 2018-07-02 PROCEDURE — 80202 ASSAY OF VANCOMYCIN: CPT | Performed by: PHYSICIAN ASSISTANT

## 2018-07-02 PROCEDURE — 25000132 ZZH RX MED GY IP 250 OP 250 PS 637: Performed by: STUDENT IN AN ORGANIZED HEALTH CARE EDUCATION/TRAINING PROGRAM

## 2018-07-02 PROCEDURE — 36592 COLLECT BLOOD FROM PICC: CPT | Performed by: PHYSICIAN ASSISTANT

## 2018-07-02 PROCEDURE — 25000132 ZZH RX MED GY IP 250 OP 250 PS 637: Performed by: THORACIC SURGERY (CARDIOTHORACIC VASCULAR SURGERY)

## 2018-07-02 PROCEDURE — 00000146 ZZHCL STATISTIC GLUCOSE BY METER IP

## 2018-07-02 PROCEDURE — 80197 ASSAY OF TACROLIMUS: CPT | Performed by: STUDENT IN AN ORGANIZED HEALTH CARE EDUCATION/TRAINING PROGRAM

## 2018-07-02 PROCEDURE — 25000125 ZZHC RX 250: Performed by: STUDENT IN AN ORGANIZED HEALTH CARE EDUCATION/TRAINING PROGRAM

## 2018-07-02 PROCEDURE — 90937 HEMODIALYSIS REPEATED EVAL: CPT

## 2018-07-02 PROCEDURE — 25000132 ZZH RX MED GY IP 250 OP 250 PS 637: Performed by: PHYSICIAN ASSISTANT

## 2018-07-02 PROCEDURE — 25000131 ZZH RX MED GY IP 250 OP 636 PS 637: Performed by: STUDENT IN AN ORGANIZED HEALTH CARE EDUCATION/TRAINING PROGRAM

## 2018-07-02 PROCEDURE — 85027 COMPLETE CBC AUTOMATED: CPT | Performed by: PHYSICIAN ASSISTANT

## 2018-07-02 PROCEDURE — 25000128 H RX IP 250 OP 636: Performed by: PHYSICIAN ASSISTANT

## 2018-07-02 PROCEDURE — 25000132 ZZH RX MED GY IP 250 OP 250 PS 637: Performed by: NURSE PRACTITIONER

## 2018-07-02 PROCEDURE — 25000132 ZZH RX MED GY IP 250 OP 250 PS 637

## 2018-07-02 PROCEDURE — 40000802 ZZH SITE CHECK

## 2018-07-02 PROCEDURE — 36415 COLL VENOUS BLD VENIPUNCTURE: CPT | Performed by: INTERNAL MEDICINE

## 2018-07-02 PROCEDURE — 80048 BASIC METABOLIC PNL TOTAL CA: CPT | Performed by: PHYSICIAN ASSISTANT

## 2018-07-02 PROCEDURE — 99232 SBSQ HOSP IP/OBS MODERATE 35: CPT | Mod: GC | Performed by: INTERNAL MEDICINE

## 2018-07-02 PROCEDURE — 86140 C-REACTIVE PROTEIN: CPT | Performed by: PHYSICIAN ASSISTANT

## 2018-07-02 PROCEDURE — 40000558 ZZH STATISTIC CVC DRESSING CHANGE

## 2018-07-02 PROCEDURE — 25000128 H RX IP 250 OP 636: Performed by: INTERNAL MEDICINE

## 2018-07-02 RX ORDER — ACETAMINOPHEN 325 MG/1
650 TABLET ORAL EVERY 4 HOURS PRN
Qty: 100 TABLET | Refills: 0 | Status: ON HOLD | OUTPATIENT
Start: 2018-07-02 | End: 2018-09-26

## 2018-07-02 RX ORDER — METRONIDAZOLE 250 MG/1
250 TABLET ORAL EVERY 8 HOURS
Qty: 12 TABLET | Refills: 0 | Status: SHIPPED | OUTPATIENT
Start: 2018-07-02 | End: 2018-07-06

## 2018-07-02 RX ORDER — IPRATROPIUM BROMIDE AND ALBUTEROL SULFATE 2.5; .5 MG/3ML; MG/3ML
3 SOLUTION RESPIRATORY (INHALATION) EVERY 4 HOURS PRN
Qty: 360 ML | Refills: 0 | Status: SHIPPED | OUTPATIENT
Start: 2018-07-02 | End: 2018-07-06

## 2018-07-02 RX ORDER — TACROLIMUS 0.5 MG/1
2.5 CAPSULE ORAL EVERY EVENING
Qty: 150 CAPSULE | Refills: 2 | Status: SHIPPED | OUTPATIENT
Start: 2018-07-02 | End: 2018-07-06

## 2018-07-02 RX ORDER — ALBUTEROL SULFATE 90 UG/1
6 AEROSOL, METERED RESPIRATORY (INHALATION) EVERY 4 HOURS PRN
Qty: 1 INHALER | Refills: 0 | Status: ON HOLD | OUTPATIENT
Start: 2018-07-02 | End: 2018-08-13

## 2018-07-02 RX ORDER — CEFPODOXIME PROXETIL 200 MG/1
200 TABLET, FILM COATED ORAL
Qty: 10 TABLET | Refills: 0 | Status: SHIPPED | OUTPATIENT
Start: 2018-07-02 | End: 2018-07-02

## 2018-07-02 RX ORDER — PREDNISONE 20 MG/1
20 TABLET ORAL EVERY MORNING
Qty: 30 TABLET | Refills: 0 | Status: SHIPPED | OUTPATIENT
Start: 2018-07-03 | End: 2018-07-20

## 2018-07-02 RX ORDER — ISOSORBIDE DINITRATE 10 MG/1
10 TABLET ORAL 3 TIMES DAILY
Status: DISCONTINUED | OUTPATIENT
Start: 2018-07-02 | End: 2018-07-02 | Stop reason: HOSPADM

## 2018-07-02 RX ORDER — SULFAMETHOXAZOLE AND TRIMETHOPRIM 400; 80 MG/1; MG/1
TABLET ORAL
Qty: 120 TABLET | Refills: 1 | Status: SHIPPED | OUTPATIENT
Start: 2018-07-02 | End: 2018-07-11 | Stop reason: ALTCHOICE

## 2018-07-02 RX ORDER — SULFAMETHOXAZOLE AND TRIMETHOPRIM 400; 80 MG/1; MG/1
1 TABLET ORAL DAILY
Qty: 120 TABLET | Refills: 1 | Status: SHIPPED | OUTPATIENT
Start: 2018-07-02 | End: 2018-07-02

## 2018-07-02 RX ORDER — PRAVASTATIN SODIUM 40 MG
40 TABLET ORAL DAILY
Qty: 90 TABLET | Refills: 0 | Status: SHIPPED | OUTPATIENT
Start: 2018-07-02 | End: 2018-07-19

## 2018-07-02 RX ORDER — HEPARIN SODIUM 1000 [USP'U]/ML
500 INJECTION, SOLUTION INTRAVENOUS; SUBCUTANEOUS
Status: COMPLETED | OUTPATIENT
Start: 2018-07-02 | End: 2018-07-02

## 2018-07-02 RX ORDER — CEFPODOXIME PROXETIL 200 MG/1
TABLET, FILM COATED ORAL
Qty: 10 TABLET | Refills: 0 | Status: SHIPPED | OUTPATIENT
Start: 2018-07-02 | End: 2018-07-06

## 2018-07-02 RX ORDER — TERBUTALINE SULFATE 5 MG/1
5 TABLET ORAL EVERY 8 HOURS
Qty: 120 TABLET | Refills: 0 | Status: SHIPPED | OUTPATIENT
Start: 2018-07-02 | End: 2018-07-10

## 2018-07-02 RX ORDER — PANTOPRAZOLE SODIUM 40 MG/1
40 TABLET, DELAYED RELEASE ORAL EVERY MORNING
Qty: 30 TABLET | Refills: 0 | Status: SHIPPED | OUTPATIENT
Start: 2018-07-03 | End: 2018-07-25

## 2018-07-02 RX ORDER — PREDNISONE 5 MG/1
15 TABLET ORAL EVERY EVENING
Qty: 100 TABLET | Refills: 0 | Status: SHIPPED | OUTPATIENT
Start: 2018-07-02 | End: 2018-07-06

## 2018-07-02 RX ORDER — HYDRALAZINE HYDROCHLORIDE 100 MG/1
100 TABLET, FILM COATED ORAL EVERY 6 HOURS
Qty: 90 TABLET | Refills: 0 | Status: SHIPPED | OUTPATIENT
Start: 2018-07-02 | End: 2018-07-10

## 2018-07-02 RX ORDER — AMOXICILLIN 250 MG
1-2 CAPSULE ORAL 2 TIMES DAILY PRN
Qty: 100 TABLET | Refills: 0 | Status: SHIPPED | OUTPATIENT
Start: 2018-07-02 | End: 2018-07-20

## 2018-07-02 RX ORDER — HEPARIN SODIUM 1000 [USP'U]/ML
500 INJECTION, SOLUTION INTRAVENOUS; SUBCUTANEOUS CONTINUOUS
Status: DISCONTINUED | OUTPATIENT
Start: 2018-07-02 | End: 2018-07-02

## 2018-07-02 RX ORDER — NYSTATIN 100000/ML
1000000 SUSPENSION, ORAL (FINAL DOSE FORM) ORAL 4 TIMES DAILY
Qty: 400 ML | Refills: 2 | Status: ON HOLD | OUTPATIENT
Start: 2018-07-02 | End: 2018-09-26

## 2018-07-02 RX ORDER — VALGANCICLOVIR 450 MG/1
450 TABLET, FILM COATED ORAL
Qty: 120 TABLET | Refills: 0 | Status: SHIPPED | OUTPATIENT
Start: 2018-07-05 | End: 2018-07-20

## 2018-07-02 RX ORDER — SULFAMETHOXAZOLE AND TRIMETHOPRIM 400; 80 MG/1; MG/1
1 TABLET ORAL DAILY
Status: DISCONTINUED | OUTPATIENT
Start: 2018-07-02 | End: 2018-07-02 | Stop reason: HOSPADM

## 2018-07-02 RX ORDER — TACROLIMUS 1 MG/1
3 CAPSULE ORAL EVERY MORNING
Qty: 100 CAPSULE | Refills: 2 | Status: SHIPPED | OUTPATIENT
Start: 2018-07-02 | End: 2018-07-06

## 2018-07-02 RX ORDER — TRAMADOL HYDROCHLORIDE 50 MG/1
50 TABLET ORAL EVERY 6 HOURS PRN
Qty: 10 TABLET | Refills: 0 | Status: ON HOLD | OUTPATIENT
Start: 2018-07-02 | End: 2018-09-11

## 2018-07-02 RX ORDER — ISOSORBIDE DINITRATE 10 MG/1
10 TABLET ORAL 3 TIMES DAILY
Qty: 120 TABLET | Refills: 0 | Status: SHIPPED | OUTPATIENT
Start: 2018-07-02 | End: 2018-07-25

## 2018-07-02 RX ORDER — SIMETHICONE 80 MG
80 TABLET,CHEWABLE ORAL EVERY 6 HOURS PRN
Qty: 180 TABLET | Refills: 0 | Status: SHIPPED | OUTPATIENT
Start: 2018-07-02 | End: 2018-07-20

## 2018-07-02 RX ORDER — MYCOPHENOLATE MOFETIL 250 MG/1
1500 CAPSULE ORAL 2 TIMES DAILY
Qty: 360 CAPSULE | Refills: 2 | Status: SHIPPED | OUTPATIENT
Start: 2018-07-02 | End: 2018-07-10

## 2018-07-02 RX ADMIN — APIXABAN 2.5 MG: 2.5 TABLET, FILM COATED ORAL at 07:48

## 2018-07-02 RX ADMIN — SODIUM CHLORIDE 250 ML: 9 INJECTION, SOLUTION INTRAVENOUS at 12:38

## 2018-07-02 RX ADMIN — SULFAMETHOXAZOLE AND TRIMETHOPRIM 1 TABLET: 400; 80 TABLET ORAL at 16:59

## 2018-07-02 RX ADMIN — ISOSORBIDE DINITRATE 10 MG: 10 TABLET ORAL at 09:47

## 2018-07-02 RX ADMIN — NYSTATIN 1000000 UNITS: 100000 SUSPENSION ORAL at 17:23

## 2018-07-02 RX ADMIN — SODIUM CHLORIDE 300 ML: 9 INJECTION, SOLUTION INTRAVENOUS at 12:37

## 2018-07-02 RX ADMIN — MYCOPHENOLATE MOFETIL 1500 MG: 250 CAPSULE ORAL at 07:48

## 2018-07-02 RX ADMIN — METRONIDAZOLE 250 MG: 250 TABLET ORAL at 15:00

## 2018-07-02 RX ADMIN — HEPARIN SODIUM 500 UNITS/HR: 1000 INJECTION, SOLUTION INTRAVENOUS; SUBCUTANEOUS at 12:38

## 2018-07-02 RX ADMIN — PANTOPRAZOLE SODIUM 40 MG: 40 TABLET, DELAYED RELEASE ORAL at 07:48

## 2018-07-02 RX ADMIN — TERBUTALINE SULFATE 5 MG: 5 TABLET ORAL at 07:48

## 2018-07-02 RX ADMIN — ASPIRIN 81 MG: 81 TABLET, COATED ORAL at 07:48

## 2018-07-02 RX ADMIN — MYCOPHENOLATE MOFETIL 1500 MG: 250 CAPSULE ORAL at 17:23

## 2018-07-02 RX ADMIN — TACROLIMUS 2.5 MG: 1 CAPSULE ORAL at 17:23

## 2018-07-02 RX ADMIN — TACROLIMUS 3 MG: 1 CAPSULE ORAL at 07:47

## 2018-07-02 RX ADMIN — HEPARIN SODIUM 500 UNITS: 1000 INJECTION, SOLUTION INTRAVENOUS; SUBCUTANEOUS at 12:38

## 2018-07-02 RX ADMIN — TERBUTALINE SULFATE 5 MG: 5 TABLET ORAL at 16:59

## 2018-07-02 RX ADMIN — NYSTATIN 1000000 UNITS: 100000 SUSPENSION ORAL at 12:16

## 2018-07-02 RX ADMIN — PREDNISONE 20 MG: 20 TABLET ORAL at 07:48

## 2018-07-02 RX ADMIN — VALGANCICLOVIR 450 MG: 450 TABLET, FILM COATED ORAL at 07:53

## 2018-07-02 RX ADMIN — METRONIDAZOLE 250 MG: 250 TABLET ORAL at 07:53

## 2018-07-02 RX ADMIN — HYDRALAZINE HYDROCHLORIDE 10 MG: 20 INJECTION INTRAMUSCULAR; INTRAVENOUS at 07:52

## 2018-07-02 RX ADMIN — VANCOMYCIN HYDROCHLORIDE 750 MG: 1 INJECTION, POWDER, LYOPHILIZED, FOR SOLUTION INTRAVENOUS at 15:05

## 2018-07-02 RX ADMIN — TRAMADOL HYDROCHLORIDE 50 MG: 50 TABLET, COATED ORAL at 00:46

## 2018-07-02 RX ADMIN — NYSTATIN 1000000 UNITS: 100000 SUSPENSION ORAL at 07:48

## 2018-07-02 RX ADMIN — ACETAMINOPHEN 650 MG: 325 TABLET, FILM COATED ORAL at 07:52

## 2018-07-02 RX ADMIN — HYDRALAZINE HYDROCHLORIDE 100 MG: 100 TABLET ORAL at 05:29

## 2018-07-02 ASSESSMENT — PAIN DESCRIPTION - DESCRIPTORS: DESCRIPTORS: ACHING

## 2018-07-02 NOTE — PROGRESS NOTES
Care Coordinator - Discharge Planning    Admission Date/Time:  4/16/2018  Attending MD:  Rony Caputo   Data  Date of initial CC assessment:  6/27/18  Chart reviewed, discussed with interdisciplinary team.   Patient was admitted for:   1. Chronic systolic heart failure (H)    2. End stage renal disease (H)    3. Heart transplant recipient (H)    4. Type 2 diabetes mellitus with stage 5 chronic kidney disease not on chronic dialysis, without long-term current use of insulin (H)    5. Heart transplanted (H)    6. Acute post-operative pain    7. Hypertension goal BP (blood pressure) < 130/80    8. Dyspnea on exertion    9. Dyslipidemia    10. Leukocytosis, unspecified type    11. Constipation, unspecified constipation type    12. Postoperative infection, initial encounter    Assessment   Full assessment completed in previous note   Concerns with insurance coverage for discharge needs: none.   Current Living Situation: Patient said that his two adult sons will be staying with him.   Support System: Supportive and Involved sons: Clara, also friend Naresh Peck.   [facility]  Transportation at Discharge: Family or friend will provide  Transportation to Medical Appointments: family to provide.     Coordination of Care and Referrals: Provided patient/family with options for outpt dialysis.   Per NP, pt will need Vancomycin IV after each dialysis run through 7/10/18.   Intervention:   Arrangements made with [facility] (Ph: [phone] Fax: [phone]) for restarting of outpt dilaysis on T-Th-Sat at 11 AM starting on 7/3/18.  Vancomycin IV to follow dialysis run through 7/10/18.   Plan  Anticipated Discharge Date:  7/2/18  Anticipated Discharge Plan:  Discharge to home with outpt f/u.     CTS Handoff completed:  YES    JONATHAN BULLARD, RN BSN  Care Coordinator Unit   [phone]

## 2018-07-02 NOTE — DISCHARGE SUMMARY
Dialysis Discharge Summary Brief    [facility]  Division of Nephrology  Nephrology Discharge Dialysis Orders  Ph: [phone]  Fax: [phone]    Murray Nicholson  MRN: [id number]  YOB: 1955    [facility]: [facility]  Primary Nephrologist: Vida    Date of Admission: 4/16/2018  Date of Discharge: 7/2/18  Discharge Diagnosis: ESRD, s/p heart transplant      [] New initiation, new dialysis orders will be faxed.    [x] Resume all previous dialysis orders with exception as noted below    New Orders (if not applicable put NA):  Estimated Dry Weight 76kg   Dialysis Duration    Dialysis Access    Antibiotics (dose per dialysis, end date) Per primary team discharge           Labs to be drawn at dialysis protocol   Other major changes to dialysis prescription (e.g. Dialysate bath, heparin, blood flow rate, etc)      Medication changes (also fax the unit a copy of the discharge summary)         Per protocol     Name of physician completing this form: Radha Arcos MD

## 2018-07-02 NOTE — PHARMACY-VANCOMYCIN DOSING SERVICE
Pharmacy Vancomycin Note  Date of Service 2018  Patient's  1955   63 year old, male    Indication: Postoperative Infection  Goal Trough Level: 15-20 mg/L  Day of Therapy: 7  Current Vancomycin regimen:  Intermittent dosing based on preHD lvls    Current estimated CrCl = Estimated Creatinine Clearance: 13.9 mL/min (based on Cr of 6.05).    Creatinine for last 3 days  2018:  5:48 AM Creatinine 6.27 mg/dL  2018:  6:09 AM Creatinine 4.64 mg/dL  2018:  6:01 AM Creatinine 6.05 mg/dL    Recent Vancomycin Levels (past 3 days)  2018:  5:48 AM Vancomycin Level 24.8 mg/L  2018:  6:01 AM Vancomycin Level 24.7 mg/L    Vancomycin IV Administrations (past 72 hours)                   vancomycin (VANCOCIN) 750 mg in sodium chloride 0.9 % 250 mL intermittent infusion (mg) 750 mg New Bag 18 1738                Nephrotoxins and other renal medications (Future)    Start     Dose/Rate Route Frequency Ordered Stop    18 0800  tacrolimus (GENERIC EQUIVALENT) capsule 3 mg      3 mg Oral EVERY MORNING. 18 1647      18 1800  tacrolimus (GENERIC EQUIVALENT) capsule 2.5 mg      2.5 mg Oral EVERY EVENING 18 1316      18 0728  vancomycin place bui - receiving intermittent dosing      1 each Does not apply SEE ADMIN INSTRUCTIONS 18 0729               Contrast Orders - past 72 hours     None          Interpretation of levels and current regimen:  Level is a pre-HD level. It is supratherapeutic (as expected)    Renal Function: hemodialysis T/T/Sat    Plan:  1.  Patient to discharge today. Recommend giving vancomycin 750 mg IV after each dialysis session (will give dose this afternoon after HD session). This is the regimen he has been receiving while inpatient based on pre-HD levels.     Niyah Trinh, PharmD        .

## 2018-07-02 NOTE — PROVIDER NOTIFICATION
Lab called with elevated tacrolimus level: 77.3. Cards 2 team called and notified. Plan to get a redraw with venipuncture

## 2018-07-02 NOTE — PROGRESS NOTES
"      Advanced Heart Failure and Transplant Cardiology  Consult Note    Assessment and Plan:  63 year old male with a PMH of CAD, ICM (EF of 15-20%), ESRD, bicuspid aortic valve with AI, severe MR, and proximal AAA who was admitted mid-April for decompensated heart failure. He underwent OHT on 6/15/2018.        # Immunosuppression:   - Mycophenolate 1500 mg BID, prednisone 20 mg / 15 mg, Tacrolimus 3 mg / 2.5 mg   - Tacrolimus: goal 10-12 ug/L    # Prophylaxis: CMV (- / +), EBV (+ / pending):   - Candida: Nystatin  - CMV (-/+): Valcyte  - PCP: Bactrim  - Vasculopathy: asa 81 mg daily, Pravastatin 40 mg daily  - Biopsy: next 7/6/2018    Of note, his Tacrolimus level was reported as 77 ug/L today. This is likely related to PICC contamination, as he received IV Tacrolimus a few weeks back. Would not change dose, and simply redraw appropriate trough level tomorrow AM from peripheral site. This should be done at his dialysis appt. We will arrange his biopsy for 7/6/2018 (7/4 is Holiday, 7/5 he has HD scheduling conflict).    Recommendations:  - next Tacrolimus level tomorrow morning in dialysis appt  - biopsy 7/6/2018  - continue IS and prophylaxis as ordered  - may discharge from our perspective     Seen and discussed with Dr. Ernst.  Sukumar Mejía, CVD Fellow  ================================================================    Interval History   - no acute events overnight  - ambulating halls without difficulty    Physical Exam   Temp: 97.6  F (36.4  C) Temp src: Oral BP: (!) 104/92 Pulse: 103 Heart Rate: 100 Resp: 18 SpO2: 98 % O2 Device: None (Room air)    Vitals:    06/30/18 1300 07/01/18 0600 07/02/18 0500   Weight: 76.5 kg (168 lb 9.6 oz) 77.3 kg (170 lb 6.4 oz) 78.4 kg (172 lb 12.8 oz)     Heart Rate: 100, Blood pressure (!) 104/92, pulse 103, temperature 97.6  F (36.4  C), temperature source Oral, resp. rate 18, height 1.727 m (5' 8\"), weight 78.4 kg (172 lb 12.8 oz), SpO2 98 %.  172 lbs 12.8 oz  GEN: NAD  CV: " tachycardic, no murmur, no S3 or S4  LUNGS:  Lungs with decreased breath sounds  ABD: Active bowel sounds, soft, non-tender/non-distended.  No rebound/guarding/rigidity.  EXT: No edema or cyanosis.      Medications:  - reviewed in Epic    Data:  - all labs and imaging reviewed

## 2018-07-02 NOTE — PROGRESS NOTES
Diabetes Consult Daily  Progress Note          Assessment/Plan:   Murray Nicholson is a 63 year old male with history of type 2 diabetes, hypertension, hyperlipidemia, ESRD ( HD, TTS), ICM ( EF 10 to 15%) due to valvular heart disease who was admitted for decompensated heart failure (4/16/2018) now s/p orthotopic heart transplant (6/14/2018).     Type 2 diabetes: PTA glipizide 5 mg   Prednisone 20 mg AM and 15 mg in PM    His prednisone dose was reduced to 20/15 BID on 6/28/2018 which he will remain at until his next biopsy on 7/6/2018. Received total of 63 units yesterday of short acting, glargine, and NPH. He was transitioned to twice daily NPH with prednisone so as to avoid having to do basal bolus insulin with 4+ injections daily. NPH provides some coverage of meals as well as prednisone induce hyperglycemia.     Plan  -ok to d/c from endo perpective  -26 units NPH with AM prednisone, 10 units with PM prednisone dose  -discontinue prandial insulin  -discontinue correction  -monitor glucose before meals HS and 0200  -will need taper of NPH with steroid taper starting 7/6/2018  -reduce bedtime NPH dose by 20% if fasting sugar <100   -please arrange for diabetes f/u as per patient's preference     Recs for discharge given to patient:  Take your insulin at the same time that you take your prednisone pills.  Current regimen:  26 units NPH with 20 mg prednisone in the morning  10 units of NPH with 15 mg prednisone in the evening     Check your sugar at least twice per day - morning when you first wakeup and right before dinner.     For adjusting your NPH use the following guidelines:    If your fasting glucose is less than 100, please reduce your night time NPH by 2-4 units    If your pre-dinner glucose is less than 100, please reduce your morning NPH by 2-4 units    If your fasting glucose is  greater than 200  for 2 or more days, please increase your night time NPH by 2-4 units    If your pre-dinner  "glucose is greater than 200 for 2 or more days, please increase your morning NPH by 2-4 units             Interval History:   The last 24 hours progress and nursing notes reviewed.  Feels well. Going home today. Knows how to check sugars and give insulin.       Recent Labs  Lab 07/02/18  1217 07/02/18  0710 07/02/18  0601 07/01/18  2229 07/01/18  1838 07/01/18  1256 07/01/18  0754 07/01/18  0609  06/30/18  0548  06/29/18  0545  06/28/18  0554  06/27/18  0629   GLC  --   --  178*  --   --   --   --  284*  --  207*  --  210*  --  290*  --  200*   * 192*  --  228* 256* 210* 262*  --   < >  --   < >  --   < >  --   < >  --    < > = values in this interval not displayed.            Review of Systems:   See interval hx          Medications:       Active Diet Order      Regular Diet Adult Thin Liquids (water, ice chips, juice, milk, gelatin, ice cream, etc)  /80  Pulse 103  Temp 97.6  F (36.4  C) (Oral)  Resp 20  Ht 1.727 m (5' 8\")  Wt 78.4 kg (172 lb 12.8 oz)  SpO2 99%  BMI 26.27 kg/m2           Data:     Lab Results   Component Value Date    A1C 7.0 04/26/2018    A1C 6.3 04/04/2018    A1C 8.6 02/02/2018    A1C 9.1 01/08/2018    A1C 6.4 06/27/2017              CBC RESULTS:   Recent Labs   Lab Test  06/26/18   0433   WBC  21.7*   RBC  3.49*   HGB  10.3*   HCT  30.0*   MCV  86   MCH  29.5   MCHC  34.3   RDW  17.4*   PLT  301     Recent Labs   Lab Test  06/26/18   0433  06/25/18   0509   NA  129*  129*   POTASSIUM  5.7*  5.0   CHLORIDE  92*  93*   CO2  18*  19*   ANIONGAP  18*  18*   GLC  268*  300*   BUN  109*  82*   CR  8.00*  6.50*   TRINI  9.2  8.8     Liver Function Studies -   Recent Labs   Lab Test  06/23/18 2126   PROTTOTAL  7.0   ALBUMIN  3.1*   BILITOTAL  0.6   ALKPHOS  136   AST  13   ALT  26     Sanjeev Swanson MD   Endocrinology Fellow  Pager: [phone]    ATTENDING NOTE    I have seen and examined the patient, reviewed and edited the fellow's note, and agree with the plan of care.    Emily " MD Peterson PhD    Division of Endocrinology and Diabetes

## 2018-07-02 NOTE — PHARMACY-TRANSPLANT NOTE
Solid Organ Transplant Recipient Prior to Discharge Note    63 year old male s/p heart transplant on 6/14/18.    Pharmacy has monitored for medication interactions and immunosuppression levels in conjunction with the multidisciplinary team. In anticipation for discharge, medication therapy needs have been addressed daily throughout the current admission via multidisciplinary rounds and/or discussions, order verification, daily clinical pharmacy review, and communication with prescribers.  Niyah Trinh, PharmD, BCPS

## 2018-07-02 NOTE — DISCHARGE SUMMARY
[facility]   Cardiothoracic Surgery Hospital Discharge Summary     Murray Nicholson MRN# [id number]   Age: 63 year old YOB: 1955     Admitting Physician:  Rony Caputo MD  Discharge Physician:  Marquis Villagomez PA-C  Primary Care Physician:        Yahir Turcios     DATE OF ADMISSION: 4/16/2018     DATE OF DISCHARGE: July 2, 2018         Primary Diagnoses:   1. End-stage congestive heart failure due to valvular disease or mixed etiology s/p heart transplant 6/14/18  2. Severe aortic insufficiency  3. Severe mitral regurgitation         Secondary Diagnoses:   1. Chronic renal disease on hemodialysis  2. Ascending aortic aneurysm  3. Hypertension  4. Hyperlipidemia   5. Type 2 diabetes  6.  Right atrium thrombus formed around the dialysis catheter    PROCEDURES PERFORMED:   Date: 4/16/2018.  Surgeon: Dr. Caputo    1. Orthotopic heart transplantation   2.  Placement of ventricular epicardial pacing leads   3.  Resection of the patient's proximal ascending aortic aneurysm.  The reinforcement of the distal aortic stump with bovine pericardial strips  4.  Removal of the right atrium thrombus      INTRAOPERATIVE FINDINGS:     Native heart was severely distended with severely decreased LV and RV function.   Donor heart with good function. No complications.     PATHOLOGY RESULTS:    6/14/2018  SA node:   - Atrial hypertrophy with focal fibrosis   - No SA node tissue identified     Right atrium #1:   - Atrial hypertrophy with focal fibrosis   - SA node tissue identified     Right atrium #2:   - Atrial hypertrophy with focal fibrosis     Heart, native:   - Myocardial hypertrophy with myocytolysis   - Patchy replacement, interstitial, and fibrosis   - Moderate calcific coronary artery disease   - Mitral and aortic valves with fibrosis and myxoid change    CULTURE RESULTS:  N/A    DRAINS/TUBES PRESENT AT DISCHARGE:  None    CONSULTS:    1.  PT/OT  2. Cardiac Rehab  3.  "Endocrinology  4. Cardiology  5. Infectious Disease  6. General Surgery    Patient discharged on aspirin:  Yes 81 mg  Patient discharged on beta blocker: no    Patient discharged on ACE Inhibitor/ARB: no             Discharge Disposition:   Discharged to home            Condition on Discharge:   Discharge condition: Stable   Discharge vitals: Blood pressure 91/51, pulse 103, temperature 98  F (36.7  C), temperature source Oral, resp. rate 18, height 1.727 m (5' 8\"), weight 78.4 kg (172 lb 12.8 oz), SpO2 99 %.     Code status on discharge: Full Code       DAY OF DISCHARGE PHYSICAL EXAM:  Vitals:    07/02/18 1316 07/02/18 1330 07/02/18 1345 07/02/18 1400   BP: 112/78 108/71 (!) 104/92 96/69   BP Location:       Cuff Size:       Pulse:       Resp:  18 18 18   Temp:       TempSrc:       SpO2: 99% 99% 98% 98%   Weight:       Height:         Vitals:    06/30/18 1300 07/01/18 0600 07/02/18 0500   Weight: 76.5 kg (168 lb 9.6 oz) 77.3 kg (170 lb 6.4 oz) 78.4 kg (172 lb 12.8 oz)     MAPs:     Gen: NAD, conversational  CV: RRR, S1S2 normal, no murmurs, rubs, or gallops  Pulm:  CTA, no rhonchi or wheezes  Abd:  Soft, nondistended, NTTP  Ext: trace dependent edema  Incision: Clean, dry, intact, no erythema  Chest Tube sites: clean, dry, intact, open to air     LABS    Recent Labs  Lab 07/02/18  1217 07/02/18  0710 07/02/18  0601 07/01/18  2229 07/01/18  1838 07/01/18  1256 07/01/18  0754 07/01/18  0609  06/30/18  0548  06/29/18  0545  06/28/18  0554  06/27/18  0629   GLC  --   --  178*  --   --   --   --  284*  --  207*  --  210*  --  290*  --  200*   * 192*  --  228* 256* 210* 262*  --   < >  --   < >  --   < >  --   < >  --    < > = values in this interval not displayed.    BRIEF HISTORY OF ILLNESS:  Patient presented with a past medical history of HFrEF (EF 10%), severe AI and MR, ESRD on HD who went to heart failure clinic for post-hospital f/u and was re-admitted to [facility] on 4/16/2018 due to respiratory distress " and for expediated workup for LVAD/heart/kidney transplant.  Patient reported that he had been taking his meds, not drinking too much water/salt, and had not missed iHD. Prior to admission, patient reports that he had been having progressively worsening dyspnea with exertion.         HOSPITAL COURSE:   Murray Nicholson is a 63 year old male who on 6/14/2018 underwent orthotopic heart transplantation.   He tolerated the operation well and postoperatively was admitted to the CVICU.  He was extubated on POD # 2 to 4 lpm via NC with neuro status intact.  His ICU stay was complicated by CRRT needed for ESRD.     He was transferred to the post-surgical telemetry unit on POD # 4. Patient was started on ASA, pravastatin and his immunosuppression per cardiology team. Patient has a history of ESRD on dialysis T/Th/Sat. He underwent RHC and biopsies on 6/20 and 6/27 which showed 1R cellular rejection with no antibody rejection. Patient was noted to have a moderately dilated ascending aortic aneurysm intra-op, therefore blood pressure was controlled with hydralazine and isordil with goal SBP less than 130. He was started on apixaban for a right IJ thrombus, plan to hold these prior to his scheduled RHC and biopsies.      Patient had incidental finding of a pneumoperitoneum on chest x-ray, this was determined to be from chest tube removal. CT abdomen was without acute pathology. General surgery was consulted and determined no indication for operation. He later developed a leukocytosis, ID was consulted. No active infectious process was identified, however patient was treated with vanc/IV flagyl and cefepime. He will continued on vanco until 7/10 (due to coag negative staph on thrombus from HD catheter) and will complete a 10 day course of PO cefpodoxime and PO flagyl per ID recommendations.       Prior to discharge, his pain was controlled well, he was able to perform most ADLs and ambulate without difficulty, and had full return of  bowel and bladder function.  On July 2, 2018, he was discharged to home in stable condition.    ECHOCARDIOGRAM, 6/20/2018-   Interpretation Summary  Global and regional left ventricular function is hyperkinetic with an EF >70%.  Right ventricular function, chamber size, wall motion, and thickness are  normal.  No pericardial effusion is present.    CXR 6/25/18-  Impression:   Pneumoperitoneum.  Left lower lobe airspace opacity which  silhouettes the posterior left hemidiaphragm, and is suspicious for  infection. In retrospect this is not significantly changed since  6/18/2018, however it is new from the preceding lateral radiograph on  4/16/2018.      ABDOMEN XR 6/27/2018  Impression:   1. Redemonstration of large volume pneumoperitoneum without  significant interval change.  2. Nonobstructive bowel gas pattern.  3. Left basilar airspace opacities again noted, which may represent  atelectasis versus aspiration or infection.  4. Postsurgical changes and support devices as detailed above.      DISCHARGE INSTRUCTIONS:    You had a full sternotomy, so avoid lifting anything greater than ten pounds for 6 weeks after surgery and then less than 20 pounds for an additional 6 weeks.    No driving for 4 weeks after surgery or while on pain medication.     Avoid strenuous activities such as bowling, vacuuming, raking, shoveling, golf or tennis for 12 weeks after your surgery. It is okay to resume sex if you feel comfortable in doing so. You may have to try different positions with your partner.     Splint your chest incision by hugging a pillow or bringing your arms across your chest when coughing or sneezing. Avoid pushing off with your arms when getting up for the first month if you have had your sternum opened.    Shower or wash your incisions daily with soap and water (or as instructed), pat dry. Keep wound clean and dry, showers are okay after discharge, but don't let spray hit directly on incision. No baths or swimming  for 1 month.  Clean wounds twice a day for 2 weeks with microklenz spray if available or just soap and water. Cover chest tube sites with gauze until they stop draining, then leave open. It is not abnormal for chest tube sites to drain yellowish/clear fluid for up to 2-3 weeks after surgery.   Watch for signs of infection: increased redness, tenderness, warmth or any drainage that appears infected (pus like) or is persistent.  Also a temperature > 100.5 F or chills. Call your surgeon or primary care provider's office immediately. Remove any skin glue left on incisions after 10-14 days. This will not affect your incision and can speed up healing.    Exercise is very important in your recovery. Please follow the guidelines set up for you in your cardiac rehab classes at the hospital. If outpatient cardiac rehab was ordered for you, we highly recommend you participate. If you have problems arranging your cardiac rehab, please call [phone].     Avoid sitting for prolonged periods of time, try to walk every hour during the day. If you have a leg incision, elevate your leg often when you are not walking.    Check your weight when you get home from the hospital and continue to check it daily through your recovery for at least a month. If you notice a weight gain of 2-3 pounds in a week, notify your primary care physician, cardiologist or surgeon.    Bowel activity may be slow after surgery. If necessary, you may take an over the counter laxative such as Milk of Magnesia or Miralax. You may have stool softeners prescribed (docusate sodium, Senokot). We recommend using stool softeners while using narcotics for pain (oxycodone/percocet, hydrocodone/vicodin).      Wean OFF of narcotics (oxycodone, dilaudid, hydrocodone) as soon as possible. You should continue taking acetaminophen as long as you have any surgical pain as the first choice for pain control.  Add narcotics as necessary for pain to be tolerable     DENTAL  VISITS AFTER SURGERY  Please notify your dentist before any procedure for the proper treatment needed. The antibiotic is taken by mouth one hour prior to visit. This includes routine cleanings.    DO NOT SMOKE.  IF YOU NEED HELP QUITTING, PLEASE TALK WITH YOUR CARDIOLOGIST OR PRIMARY DOCTOR.    You had a heart transplant call your Transplant Coordinator for follow-up care and management.     REGARDING PRESCRIPTION REFILLS.  If you need a refill on your pain medication contact us to discuss your pain and a possible one time refill.   All other medications will be adjusted, discontinued and re-filled by your primary care physician and/or your cardiologist as they were prior to your surgery. We have given you enough for one to three month with possibly one refill.      FOLLOW UP APPOINTMENTS:   You have a follow-up visit with your cardiology team for your first post-op heart transplant visit on 7/10/2018 at 1:45PM at the [facility].   If there is a need to return to see CT Surgery please call our  at [phone].      PRE-ADMISSION MEDICATIONS:    No current facility-administered medications on file prior to encounter.   Current Outpatient Prescriptions on File Prior to Encounter:  allopurinol (ZYLOPRIM) 300 MG tablet Take 1 tablet (300 mg) by mouth daily   ASPIRIN 81 MG OR TABS Take 1 tablet (81 mg) by mouth at bedtime   bismuth subsalicylate (PEPTO BISMOL) 262 MG/15ML suspension Take 15 mLs by mouth every 6 hours as needed for indigestion   blood glucose monitoring (ACCU-CHEK FASTCLIX) lancets Use to test blood sugar 2-3 times daily or as directed.  Ok to substitute alternative if insurance prefers.   blood glucose monitoring (NO BRAND SPECIFIED) test strip Use to test blood sugar 2-3 times daily or as directed.   fluticasone (FLONASE) 50 MCG/ACT spray Spray 1-2 sprays into both nostrils daily   loratadine (CLARITIN) 10 MG tablet Take 10 mg by mouth daily as needed Reported on  5/3/2017   order for DME Equipment being ordered: Carol ()Treatment Diagnosis: ESRD on PDPt has to be connected to PD all night and can not be disconnected, hence impending his mobility to go to the bathroom. At risk for infection if he does not have this equipment. (Patient not taking: Reported on 4/4/2018)   [DISCONTINUED] albuterol (PROAIR HFA/PROVENTIL HFA/VENTOLIN HFA) 108 (90 BASE) MCG/ACT Inhaler Inhale 2 puffs into the lungs every 6 hours as needed for shortness of breath / dyspnea or wheezing        DISCHARGE MEDICATIONS:    Murray Nicholson   Home Medication Instructions LEXII:[id number]    Printed on:07/02/18 1630   Medication Information                      acetaminophen (TYLENOL) 325 MG tablet  Take 2 tablets (650 mg) by mouth every 4 hours as needed for mild pain (multimodal surgical pain management along with NSAIDS and opioid medication as indicated based on pain control and physical function.)             albuterol (PROAIR HFA/PROVENTIL HFA/VENTOLIN HFA) 108 (90 Base) MCG/ACT Inhaler  Inhale 6 puffs into the lungs every 4 hours as needed for shortness of breath / dyspnea             allopurinol (ZYLOPRIM) 300 MG tablet  Take 1 tablet (300 mg) by mouth daily             apixaban ANTICOAGULANT (ELIQUIS) 2.5 MG tablet  Take 1 tablet (2.5 mg) by mouth 2 times daily             ASPIRIN 81 MG OR TABS  Take 1 tablet (81 mg) by mouth at bedtime             bismuth subsalicylate (PEPTO BISMOL) 262 MG/15ML suspension  Take 15 mLs by mouth every 6 hours as needed for indigestion             blood glucose monitoring (ACCU-CHEK FASTCLIX) lancets  Use to test blood sugar 2-3 times daily or as directed.  Ok to substitute alternative if insurance prefers.             blood glucose monitoring (NO BRAND SPECIFIED) test strip  Use to test blood sugar 2-3 times daily or as directed.             cefpodoxime (VANTIN) 200 MG tablet  200 mg after HD on Saturday 6/30/2018, Tuesday 7/3/2018 and Thursday 7/5/2018 then DC  (total of 10 days).             fluticasone (FLONASE) 50 MCG/ACT spray  Spray 1-2 sprays into both nostrils daily             hydrALAZINE (APRESOLINE) 100 MG TABS tablet  Take 1 tablet (100 mg) by mouth every 6 hours             insulin isophane human (HUMULIN N PEN) 100 UNIT/ML injection  Inject 10 Units Subcutaneous daily (with dinner)             insulin isophane human (HUMULIN N PEN) 100 UNIT/ML injection  Inject 26 Units Subcutaneous every morning (before breakfast)             ipratropium - albuterol 0.5 mg/2.5 mg/3 mL (DUONEB) 0.5-2.5 (3) MG/3ML neb solution  Take 1 vial (3 mLs) by nebulization every 4 hours as needed for shortness of breath / dyspnea             isosorbide dinitrate (ISORDIL) 10 MG tablet  Take 1 tablet (10 mg) by mouth 3 times daily             loratadine (CLARITIN) 10 MG tablet  Take 10 mg by mouth daily as needed Reported on 5/3/2017             melatonin 1 MG TABS tablet  Take 1 tablet (1 mg) by mouth nightly as needed for sleep             metroNIDAZOLE (FLAGYL) 250 MG tablet  Take 1 tablet (250 mg) by mouth every 8 hours for 4 days             mycophenolate (GENERIC EQUIVALENT) 250 MG capsule  Take 6 capsules (1,500 mg) by mouth 2 times daily             NEPHROCAPS 1 MG capsule  Take 1 capsule by mouth daily             nystatin (MYCOSTATIN) 126920 UNIT/ML suspension  Swish and swallow 10 mLs (1,000,000 Units) in mouth 4 times daily             order for DME  Equipment being ordered: Com[employer] ()  Treatment Diagnosis: ESRD on PD  Pt has to be connected to PD all night and can not be disconnected, hence impending his mobility to go to the bathroom. At risk for infection if he does not have this equipment.             pantoprazole (PROTONIX) 40 MG EC tablet  Take 1 tablet (40 mg) by mouth every morning             pravastatin (PRAVACHOL) 40 MG tablet  Take 1 tablet (40 mg) by mouth daily             predniSONE (DELTASONE) 20 MG tablet  Take 1 tablet (20 mg) by mouth every morning              predniSONE (DELTASONE) 5 MG tablet  Take 3 tablets (15 mg) by mouth every evening             senna-docusate (SENOKOT-S;PERICOLACE) 8.6-50 MG per tablet  Take 1-2 tablets by mouth 2 times daily as needed for constipation             simethicone (MYLICON) 80 MG chewable tablet  Take 1 tablet (80 mg) by mouth every 6 hours as needed for cramping             sulfamethoxazole-trimethoprim (BACTRIM/SEPTRA) 400-80 MG per tablet  Take 1 half-tablet by mouth daily.             tacrolimus (GENERIC EQUIVALENT) 0.5 MG capsule  Take 5 capsules (2.5 mg) by mouth every evening             tacrolimus (GENERIC EQUIVALENT) 1 MG capsule  Take 3 capsules (3 mg) by mouth every morning             terbutaline (BRETHINE) 5 MG tablet  1 tablet (5 mg) by Oral or Feeding Tube route every 8 hours             traMADol (ULTRAM) 50 MG tablet  Take 1 tablet (50 mg) by mouth every 6 hours as needed for moderate pain             valGANciclovir (VALCYTE) 450 MG tablet  Take 1 tablet (450 mg) by mouth twice a week             vancomycin  Administer following dialysis on Tuesday 7/3, Thursday 7/5, Saturday 7/7, and last dose on Tuesday 7/10.                 CC:Yahir Solomon Thenappan Alec J. Droege  [facility]   Cardiothoracic Surgery  Office phone: [phone]

## 2018-07-02 NOTE — PROGRESS NOTES
CVTS Daily Note  7/2/2018  Attending: Rony Caputo,*    S:   No overnight events.   Pt seen at bedside resting comfortably.    No acute complaints.      Denies F/C/Sweats.  No CP, SOB, or calf pain.    Tolerating diet.  + BM.  + Flatus.    Ambulated well without assistance.    Pain level tolerable. Plan as per Neuro section below.     O:   Vitals:    07/02/18 0400 07/02/18 0500 07/02/18 0707 07/02/18 0743   BP:  (!) 156/91 153/82 151/84   BP Location:  Right arm Right arm    Cuff Size:       Pulse:  103     Resp: 16 16 18    Temp:  97.2  F (36.2  C) 97.5  F (36.4  C)    TempSrc:  Oral Oral    SpO2: 100%  100%    Weight:  78.4 kg (172 lb 12.8 oz)     Height:         Vitals:    06/30/18 1300 07/01/18 0600 07/02/18 0500   Weight: 76.5 kg (168 lb 9.6 oz) 77.3 kg (170 lb 6.4 oz) 78.4 kg (172 lb 12.8 oz)       Intake/Output Summary (Last 24 hours) at 07/02/18 0931  Last data filed at 07/01/18 2200   Gross per 24 hour   Intake              400 ml   Output                0 ml   Net              400 ml       MAPs:   Gen: AAO x 3, pleasant, NAD  CV: RRR, S1S2 normal, no murmurs, rubs, or gallops.   Pulm: CTA, no rhonchi or wheezes  Abd: soft, non-tender, no guarding  Ext: no peripheral edema  Incision: clean, dry, intact, no erythema  Chest Tube sites: dressings clean and dry    Labs:  BMP    Recent Labs  Lab 07/02/18  0601 07/01/18  0609 06/30/18  0548 06/29/18  0545   * 131* 134 136   POTASSIUM 5.2 4.6 4.7 4.6   CHLORIDE 97 97 99 101   TRINI 8.4* 8.4* 8.6 8.6   CO2 16* 21 18* 23   BUN 59* 40* 57* 38*   CR 6.05* 4.64* 6.27* 4.77*   * 284* 207* 210*     CBC    Recent Labs  Lab 07/02/18  0601 07/01/18  0609 06/30/18  0548 06/29/18  0545   WBC 10.8 9.6 12.5* 11.4*   RBC 3.03* 2.93* 2.98* 2.98*   HGB 8.8* 8.4* 8.6* 8.7*   HCT 27.3* 25.5* 26.8* 26.2*   MCV 90 87 90 88   MCH 29.0 28.7 28.9 29.2   MCHC 32.2 32.9 32.1 33.2   RDW 18.0* 18.0* 17.9* 18.2*    214 219 227     INR    Recent Labs  Lab  06/28/18  0554 06/27/18  0629 06/26/18  0433   INR 1.15* 1.10 1.07      Hepatic Panel   Lab Results   Component Value Date    AST 13 06/23/2018     Lab Results   Component Value Date    ALT 26 06/23/2018     Lab Results   Component Value Date    ALBUMIN 3.1 06/23/2018     GLUCOSE:     Recent Labs  Lab 07/02/18  0710 07/02/18  0601 07/01/18  2229 07/01/18  1838 07/01/18  1256 07/01/18  0754 07/01/18  0609 06/30/18  2308  06/30/18  0548  06/29/18  0545  06/28/18  0554  06/27/18  0629   GLC  --  178*  --   --   --   --  284*  --   --  207*  --  210*  --  290*  --  200*   *  --  228* 256* 210* 262*  --  185*  < >  --   < >  --   < >  --   < >  --    < > = values in this interval not displayed.      Imaging:  reviewed recent imaging      ASSESSMENT/PLAN: Patient is a 63 year old male with a history of CAD, ICM (EF of 15-20%), ESRD on HD, bicuspid aortic valve with AI, severe MR, and proximal AAA who was admitted for decompensated heart failure 4/16/18 now s/p heart transplant on 6/14/18. 6/20 biopsy showed 1R cellular rejection.  RHC and biopsy 6/21; 1R cellular and no antibody rejection   RHC and biopsy 6/27; 1R cellular and no antibody rejection       Neuro:  -Acute post-op pain: tylenol and ultram prn      CV:  - ASA 81 mg, pravastatin.  - Blood pressure (goal SBP less than 130 given aortic aneurysm): borderline controlled, on hydralazine 100 mg qid, 7/2 re-started isordil to 10 mg q8h.  Was started on lopressor due to afib (by cardiology), discontinued after d/w Dr Caputo  - TPW have been removed  - RHC and biopsy 6/27; 1R cellular and no antibody rejection   - RHC and biopsy scheduled next on 7/5         Resp:   - Extubated POD 2  - IS, encourage activity  - All chest tubes have been removed, CXR with stable pneumoperitoneum (likely from chest tube removal). Has LLL opacity, stable, CT chest 6/26 unremarkable      FEN/GI:   - regular diet, hold bowel regimen, Multiple BM with trace blood on toilet paper,  repeating abdominal film today. No abdominal distention or tenderness, general surgery following given pneumoperitonium that has been stable, CT abdomin without bowel inflammation      Renal:   - ESRD on dialsysis, on iHD, T/TH/Sat  - Volume status: dry to slightly hypervolemic, pulling fluid with HD runs  - 7/2 hyponatremia, hyperkalemic- discussed with nephrology and will plan for a 2 hour HD run prior to discharge      ID: Completed daniella-op abx,   - WBC trending down, afebrile, Is having some loose stools, c.diff negative 6/25, UA unremarkable, CXR with stable LLL opacity, will start vanc/cefipime (6/26), ID consult, recommended addition of IV flagyl. CT c/a/p with and without contrast unremarkable.   - ID Recommendations: Vanco until 7/10, start PO flagyl, start PO Cefpodoxime PO after HD on Saturday 6/30/2018, Tuesday 7/3/2018 and Thursday 7/5/2018 then DC (total of 10 days).    - Immunosuppression per cards      Heme:   - Hgb 8's, stable.       Endo:   - BG consistently above 200, on SSI started lantus 8U 6/25  - Endocrine following       PPx:   - PPI      Anticoagulation:   - Apixaban for right IJ thrombus       Dispo:   - 6C since 6/17  - looking at possible discharge Monday/Tuesday with IV Vanco dosing        Discussed with CVTS Fellow   Staff surgeons have been informed of changes through both  verbal and written communication.      VERONICA Bone CNP   Cardiothoracic Surgery  7/2/2018 at 9:33 AM  P: [phone]

## 2018-07-02 NOTE — PROGRESS NOTES
"Nephrology Progress Note  07/02/2018         Murray Nicholson is a 63 year old year old male with PMH of HTN, DMII, functionally bicuspid aortic valve, CHF/CM (EF 10-15%), ESRD, admitted with worsening dyspnea/SOB, now S/P heart tx 6/14 and started on CRRT now on HD  Discharge planning ongoing and likely leaving today      Interval History  Mr Nicholson is recovering from heart tx 6/14, had elevated WBC last week but now back to normal with workup with cultures and CT unremarkable. Sodium today is 128, is not symptomatic    Assessment & Recommendations:   ESRD: due to DM, on PD since Aug 2017, changed to HD 1/18, now TTS, at [facility] under care Dr Mcpherson, RIJ tunnel, EDW 75.5-76 kg prior to tx, 3.5 hrs.                          -HD on TTS schedule, plan to run tomorrow.                         -Line is tunneled [facility]        Volume-EDW 76kg as outpt, 78.4 kg which is up in last couple days. Given this and hyponatremia, could do short run today. Patient agrees. Fluid restriction to 1 liter a day or so         Electrolytes/pH-K 4.6, bicarb 16, no acute issues, running HD for 2 hours today.        BMD- Ca 8.3, alb 3.1 last check, phos 5.2 last check, no acute issues.      Heart Tx-On mycophenolate and methylpred, started tacro, within goal levels.        ID-WBC now WNL, CT unremarkable.        Anemia-Cont venofer 100 mg Qtues, cont Epo 4000 u with dialysis.      Nutrition-Taking PO.          Recommendations were communicated to team via verbal communication.      Review of Systems:   I reviewed the following systems:  Gen: No fevers or chills  CV: No CP  Resp: No SOB  GI: No N/V    Physical Exam:   I/O last 3 completed shifts:  In: 760 [P.O.:760]  Out: -    BP (!) 104/92  Pulse 103  Temp 97.6  F (36.4  C) (Oral)  Resp 18  Ht 1.727 m (5' 8\")  Wt 78.4 kg (172 lb 12.8 oz)  SpO2 98%  BMI 26.27 kg/m2     GENERAL APPEARANCE: Sitting in chair, in no distress  EYES:  No scleral icterus, pupils equal  HENT: mouth without " ulcers or lesions  PULM: lungs clear to auscultation, equal air movement, no cyanosis or clubbing  CV: regular rhythm, normal rate, no rub     -JVP not elevated     -edema trace LE.  GI: soft, non-tender, not distended, bowel sounds are +  MS: no evidence of inflammation in joints, no muscle tenderness  NEURO: Alert and oriented  Lines-tunneled HD line    Labs:   All labs reviewed by me  Electrolytes/Renal -   Recent Labs   Lab Test  07/02/18   0601  07/01/18   0609  06/30/18   0548   06/19/18   0657  06/18/18   0827  06/17/18   1630   NA  128*  131*  134   < >  128*  130*  130*   POTASSIUM  5.2  4.6  4.7   < >  5.1  5.0  4.6   CHLORIDE  97  97  99   < >  94  94  94   CO2  16*  21  18*   < >  18*  22  23   BUN  59*  40*  57*   < >  68*  52*  34*   CR  6.05*  4.64*  6.27*   < >  6.50*  5.01*  3.48*   GLC  178*  284*  207*   < >  170*  113*  168*   TRINI  8.4*  8.4*  8.6   < >  9.0  9.0  9.6   MAG   --    --    --    --   2.2  2.2  2.2   PHOS   --    --    --    --   5.2*  4.0  4.3    < > = values in this interval not displayed.       CBC -   Recent Labs   Lab Test  07/02/18   0601  07/01/18   0609  06/30/18   0548   WBC  10.8  9.6  12.5*   HGB  8.8*  8.4*  8.6*   PLT  188  214  219       LFTs -   Recent Labs   Lab Test  06/23/18   2126  06/18/18   0827  06/17/18   0433   ALKPHOS  136  114  105   BILITOTAL  0.6  0.8  1.1   ALT  26  34  33   AST  13  56*  92*   PROTTOTAL  7.0  6.9  7.2   ALBUMIN  3.1*  3.3*  3.5       Iron Panel -   Recent Labs   Lab Test  05/08/18   0954  07/19/17   1306  07/05/17   1204   IRON  58  46  26*   IRONSAT  27  18  12*   ALBERTINA  621*  369  542*           Current Medications:    apixaban ANTICOAGULANT  2.5 mg Oral BID     aspirin  81 mg Oral Daily     cefpodoxime  200 mg Oral Once per day on Tue Thu Sat     hydrALAZINE  100 mg Oral Q6H     insulin aspart   Subcutaneous TID w/meals     insulin aspart  1-10 Units Subcutaneous TID AC     insulin aspart  1-7 Units Subcutaneous At Bedtime     insulin  isophane human  20 Units Subcutaneous QAM AC     insulin isophane human  6 Units Subcutaneous Daily with supper     insulin isophane human  8 Units Subcutaneous Once     isosorbide dinitrate  10 mg Oral TID     metroNIDAZOLE  250 mg Oral Q8H JEAN     mycophenolate  1,500 mg Oral BID IS     NEPHROCAPS  1 capsule Oral Daily     nystatin  1,000,000 Units Swish & Swallow 4x Daily     pantoprazole  40 mg Oral QAM     pravastatin  40 mg Oral Daily     predniSONE  15 mg Oral QPM     predniSONE  20 mg Oral QAM     senna-docusate  1 tablet Oral BID     sodium chloride (PF)  3 mL Intracatheter Q8H     sulfamethoxazole-trimethoprim  1 half-tab Oral QPM     tacrolimus  2.5 mg Oral QPM     tacrolimus  3 mg Oral QAM     terbutaline  5 mg Oral or Feeding Tube Q8H     valGANciclovir  450 mg Oral Once per day on Mon Thu     vancomycin (VANCOCIN) IV  750 mg Intravenous Once     vancomycin place bui - receiving intermittent dosing  1 each Does not apply See Admin Instructions       IV fluid REPLACEMENT ONLY       IV fluid REPLACEMENT ONLY       dextrose 5% and 0.45% NaCl 10 mL/hr at 06/15/18 0900     heparin (porcine) 500 Units/hr (07/02/18 1238)     Reason beta blocker order not selected           Radha Arcos

## 2018-07-02 NOTE — PLAN OF CARE
Problem: Patient Care Overview  Goal: Plan of Care/Patient Progress Review  RN:  1.Pt will remain hemodynamically stable.  2.BG WNL.   OT/6C: Cancel. Pt at dialysis this PM, will check back as schedule allows.

## 2018-07-02 NOTE — PLAN OF CARE
"Problem: Patient Care Overview  Goal: Plan of Care/Patient Progress Review  RN:  1.Pt will remain hemodynamically stable.  2.BG WNL.   Outcome: Improving  D/I:?Patient on unit 6C  Neuro- WDL  CV-  Sinus tach, HR in mid 100s  Pulm- 100% on RA  GI- Watery stools, patient describes them as \"spinach like.\"   - Dialysis  Gtts- None  Skin- Sternal incision  IV's/Drains- L PICC  Pain- Headache, tramadol given X1.  See flow sheets for further interventions and assessments.   A: Stable   P:?Continue to monitor pt closely. Notify MD of significant changes.          "

## 2018-07-02 NOTE — PROGRESS NOTES
HEMODIALYSIS TREATMENT NOTE    Date: 7/2/2018  Time: 2:32 PM    Data:  Pre Wt: 78.4 kg (173 lb 8 oz)   Desired Wt: 76.4 kg   Post Wt: 76.4 kg (165 lb 12.6 oz)  Weight gain: -2 kg   Weight change: 2 kg  Ultrafiltration - Post Run Net Total Removed (mL): 2000 mL  Ultrafiltration - Post Run Net Total Gain (mL): 0 mL  Vascular Access Status: Yes, secured and visible  Dialyzer Rinse: Streaked, Light  Total Blood Volume Processed: 40.2    Total Dialysis (Treatment) Time: 2hrs     Interventions:Assessments  Pt dialyzed today for 2hrs with a UF total of 2kgs. VSS throughout. See MAR for meds given. See Epic for further details. Report to primary RN     Plan:  Per renal

## 2018-07-03 ENCOUNTER — PRE VISIT (OUTPATIENT)
Dept: TRANSPLANT | Facility: CLINIC | Age: 63
End: 2018-07-03

## 2018-07-03 ENCOUNTER — TELEPHONE (OUTPATIENT)
Dept: FAMILY MEDICINE | Facility: CLINIC | Age: 63
End: 2018-07-03

## 2018-07-03 ENCOUNTER — TELEPHONE (OUTPATIENT)
Dept: TRANSPLANT | Facility: CLINIC | Age: 63
End: 2018-07-03

## 2018-07-03 ENCOUNTER — PATIENT OUTREACH (OUTPATIENT)
Dept: CARE COORDINATION | Facility: CLINIC | Age: 63
End: 2018-07-03

## 2018-07-03 ENCOUNTER — CARE COORDINATION (OUTPATIENT)
Dept: CARE COORDINATION | Facility: CLINIC | Age: 63
End: 2018-07-03

## 2018-07-03 DIAGNOSIS — Z94.1 HEART REPLACED BY TRANSPLANT (H): Primary | ICD-10-CM

## 2018-07-03 RX ORDER — LIDOCAINE 40 MG/G
CREAM TOPICAL
Status: CANCELLED | OUTPATIENT
Start: 2018-07-03

## 2018-07-03 ASSESSMENT — ACTIVITIES OF DAILY LIVING (ADL): DEPENDENT_IADLS:: INDEPENDENT

## 2018-07-03 NOTE — TELEPHONE ENCOUNTER
----- Message from Lillie Ulloa RN sent at 7/3/2018 12:29 PM CDT -----  Regarding: Move??   Any chance of moving his Angiogram et al on Fri 7/13, Mon 7/16, Wed 7/18 from Tues 7/17? He has dialysis on T, Thurs. If Wed 7/18 - he could see Mellisa maybe at 8AM, 730 labs, followed by ECHO and CXR??      If can't move the cath lab will leave everything!    Thanks!    Lillie

## 2018-07-03 NOTE — PROGRESS NOTES
Clinic Care Coordination Contact    Clinic Care Coordination Contact  OUTREACH    Referral Information:  Referral Source: IP Report    Primary Diagnosis: CHF/heart transplant    Chief Complaint   Patient presents with     Clinic Care Coordination - Post Hospital     RN        Universal Utilization: no concerns  Clinic Utilization  Difficulty keeping appointments:: Yes  Utilization    Last refreshed: 7/3/2018  1:28 PM:  No Show Count (past year) 7       Last refreshed: 7/3/2018  1:28 PM:  ED visits 0       Last refreshed: 7/3/2018  1:28 PM:  Hospital admissions 4          Current as of: 7/3/2018  1:28 PM             Clinical Concerns:  Current Medical Concerns:  Heart transplant    Current Behavioral Concerns: none    Education Provided to patient: NA   Pain  Chronic pain (GOAL):: No  Health Maintenance Reviewed: Due/Overdue   Clinical Pathway: Clinic Care Coordination CHF Assessment    Discharge:    Hospital summary: end stage CHF s/p heart transplant  Day of hospital discharge: 7-2-18  What recommendations were made for follow up after your recent hospitalization? Follow up with PCP; start cardiac rehab  Have the follow up appointments been scheduled? Yes  If not, can I help you set up these appointments? NA  Transportation concerns (GOAL):: No    CHF:    Home scale available:  Yes  Home scale weight this morning: not reviewed  Hospital discharge weight: 172 12.8 oz   Current weight: not reviewed   Heart Failure Zones sheet on refrigerator or available: No  Any increased SOB since hospital discharge:  No  Any increased edema since hospital discharge:  No  What number to call for YELLOW zones: [phone]    Symptom Review:   Heart Failure Symptoms  Shortness of breath:: No  Wheezing or noisy breathing?: No  Cough: No  Increased sputum: No  Fever: No  Chest pain: : No  Heartbeat: Regular  Dizzy or Lightheaded: No  Checking weight daily? : Yes  Weight?: Unchanged  Weight increase more than 2 lbs in 24 hours?:  "No  Weight Increase more than 5 lbs in 1 week? : No  Does the patient have understanding of Diuretic self-management?: Yes  Diet:: No added salt  Appetite:: Normal  Bloating:: None  Urination:: Normal  Fatigue: No  Weakness (Heaviness in limbs):: No  What Heart Failure zone are you currently in?: Green  Overall your CHF symptoms are (GOAL):: Improving  How confident are you with the plan we have identified?: 10- Completely confident    Medications:  \"How many new medications are you on since your hospitalization?\"  2 or more -- Epic MTM referral needed  \"How many of your current medicines changed (dose, timing, name, etc.) while you were in the hospital?\"  2 or more -- Epic MTM referral needed  \"Do you have questions about your medications?\"  yes-Pharm D has reached out to patient and he would like to get more blood sugar readings before following up.   For patients on insulin: \"Did you start on insulin in the hospital or did you have your insulin dose changed?\"  Yes -- Diabetes education referral needed (unless MTM referral already ordered)  Is patient on Warfarin?  No  Is Ejection Fraction <40%: Yes:   Medication list updated as needed.             Medication Management:  Understands changes. Will follow up with Pharm D     Functional Status:  Dependent ADLs:: Independent  Dependent IADLs:: Independent  Bed or wheelchair confined:: No  Mobility Status: Independent    Living Situation:       Diet/Exercise/Sleep:  Diet:: Regular  Inadequate nutrition (GOAL):: No  Food Insecurity: No  Tube Feeding: No  Exercise:: Currently not exercising  Significant changes in sleep pattern (GOAL): No    Transportation:  Transportation concerns (GOAL):: No  Transportation means:: Friend, Regular car     Psychosocial:  Mental health DX:: No  Mental health management concern (GOAL):: No     Financial/Insurance:   Financial/Insurance concerns (GOAL):: No       Resources and Interventions:  Current Resources:    ;   Community Resources: " Cardiac Rehab, [facility], Dialysis Services          Advance Care Plan/Directive  Advanced Care Plans/Directives on file:: Yes  Type Advanced Care Plans/Directives: Advanced Directive - On File          Goals:         Patient/Caregiver understanding: good    Outreach Frequency: weekly  Future Appointments              In 2 days UU LAB GOLD WAITING [facility], [place], Lab, [facility] O    In 2 days U2A ROOM 6 Unit 2A [facility] O    In 2 days UUHCVR3 [facility], [place],  Heart Cath Lab, [facility] O    In 3 days Yahir Turcios MD [facility]    In 1 week [facility] Suh APRN CNP [facility]    In 2 weeks UUXR3 [facility], [place],  Radiology, [facility] O    In 2 weeks UUECHR2 [facility],  Echocardiography, [facility] O    In 2 weeks U2A ROOM 4 Unit 2A [facility] O    In 2 weeks UUHCVR4 [facility], [place],  Heart Cath Lab, [facility] O    In 2 weeks Mellisa Suh APRN CNP [facility], [facility]    In 3 weeks [facility]    In 3 weeks Mellisa Suh APRN CNP [facility]    In 3 weeks U2A ROOM 17 Unit 2A [facility] O    In 3 weeks UUHCVR4 [facility], [place],  Heart Cath Lab, [facility] O    In 1 month UU LAB GOLD WAITING [facility], [place], Lab, [facility] O    In 1 month U2A ROOM 15 Unit 2A [facility] O    In 1 month UUHCVR3 [facility], [place],  Heart Cath Lab, [facility] O    In 1 month Mellisa Suh APRN CNP [facility]    In 1 month UU LAB GOLD WAITING [facility], [place], Lab, [facility] O    In 1 month U2A ROOM 17 Unit 2A [facility] O    In 1 month UUHCVR3 [facility], [place],  Heart Cath Lab, [facility] O    In 1 month Mellisa Suh APRN CNP [facility]    In 2 months UU LAB GOLD WAITING [facility], [place], Lab, [facility] O    In 2 months U2A ROOM 9 Unit 2A [facility] O    In 2 months UUHCVR3 [facility], [place],  Heart Cath Lab,  [facility] O    In 2 months UUXR1 [facility] [place],  Radiology, [facility] O    In 2 months UUECHR2 [facility], [place],  Echocardiography, [facility] O    In 2 months Mellisa Suh APRN CNP [facility]    In 3 months UU LAB GOLD WAITING [facility], [place], Lab, [facility] O    In 3 months U2A ROOM 17 Unit 2A [facility], [facility] O    In 3 months UUHCVR3 [facility], [place],  Heart Cath Lab, [facility] O    In 3 months Mellisa Suh APRN CNP [facility]    In 4 months UU LAB GOLD WAITING [facility], [place], Lab, [facility] O    In 4 months U2A ROOM 17 Unit 2A [facility], [facility] O    In 4 months UUHCVR3 [facility], [place],  Heart Cath Lab, [facility] O    In 4 months Mellisa Suh APRN CNP [facility]    In 5 months UU LAB GOLD WAITING [facility], [place], Lab, [facility] O    In 5 months UUECHR2 [facility], [place],  Echocardiography, [facility] O    In 5 months U2A ROOM 12 Unit 2A [facility], [facility] O    In 5 months UUHCVR3 [facility], [place],  Heart Cath Lab, [facility] O    In 5 months Mellisa Suh APRN CNP [facility]    In 6 months Klever Ernst MD [facility]          Plan: appt scheduled with PCP this Friday for hospital follow up. Wants to have more blood sugar readings to bring to Pharm D appt. Recent start on insulin. Notes bp's and bs's improved at home since discharge. Patient at dialysis now. No questions or concerns per patient. Will have RN care coordinator (I am covering vacation) follow up next week. He agrees with plan.     Kari Tran R.N.  Clinic Care Coordinator  [facility]  [phone]

## 2018-07-03 NOTE — PLAN OF CARE
Problem: Patient Care Overview  Goal: Plan of Care/Patient Progress Review  RN:  1.Pt will remain hemodynamically stable.  2.BG WNL.   Occupational Therapy Discharge Summary    Reason for therapy discharge:    Discharged to home with outpatient therapy.    Progress towards therapy goal(s). See goals on Care Plan in [facility] electronic health record for goal details.  Goals met    Therapy recommendation(s):    Continued therapy is recommended.  Rationale/Recommendations:  OP Rehab to progress pt strength and endurance and facilitate return to PLOF.

## 2018-07-03 NOTE — TELEPHONE ENCOUNTER
July 3, 2018: I spoke with Murray to let him know that we've moved his appts to July 18 and July 20 so it doesn't interfere with his dialysis schedule. I assured him I would send a schedule to both his home and email.    Roxana Lebron  Post-Heart Transplant   [phone]

## 2018-07-03 NOTE — TELEPHONE ENCOUNTER
Please call for In Patient follow up  Chief Complaint: Systolic Heart Failure, Heart Failure (H), Pre-Transplant Screening, Intraductal Papillary Mucinous Neoplasm

## 2018-07-03 NOTE — TELEPHONE ENCOUNTER
ED / Discharge Outreach Protocol    Patient Contact    Attempt # 1    Was call answered?  No.  Left message on voicemail with information to call me back.      Myriam Clinton RN

## 2018-07-03 NOTE — PROGRESS NOTES
Patient has clinic visit within 24-48 hours of Discharge so no post DC follow up call is needed              Jul 05, 2018  9:00 AM CDT   LAB with [facility] LAB GOLD WAITING   [facility]     [address] [address]                                Jul 05, 2018  9:30 AM CDT   Procedure - 2.5 hour with U2A ROOM 15   Unit 2A [facility] [place] ([facility])     [address] [address]                    Jul 05, 2018 10:30 AM CDT   Cath 90 Minute with [facility]CVR3   [facility]     [address] [address]   [phone]

## 2018-07-03 NOTE — TELEPHONE ENCOUNTER
Pt reports doing well; at dialysis.  , 152. Pt agreed to have MTM pharmacist contact him next week.   Reviewed schedule for 7/5 (biopsy) and 7/10 (clinic). Discussed need to hold Eliquis and ensure 12-hour trough on FK.     Enc to call with questions.

## 2018-07-03 NOTE — TELEPHONE ENCOUNTER
[employer] waiting time modification form submitted for kidney wait list. [employer] granted wait time back. Pt now has qualifying wait time from 3/24/2016.    Coordinator called pt to let him know [employer] gave him back wait time. Reviewed needed kidney wait list appointments and to expect a call from our  in the upcoming weeks. Pt verbalizes understanding and has no further questions. Encouraged pt to call with any questions.

## 2018-07-05 ENCOUNTER — HOSPITAL ENCOUNTER (OUTPATIENT)
Facility: CLINIC | Age: 63
Discharge: HOME | End: 2018-07-05
Attending: INTERNAL MEDICINE | Admitting: INTERNAL MEDICINE
Payer: MEDICARE

## 2018-07-05 ENCOUNTER — APPOINTMENT (OUTPATIENT)
Dept: MEDSURG UNIT | Facility: CLINIC | Age: 63
End: 2018-07-05
Attending: INTERNAL MEDICINE
Payer: MEDICARE

## 2018-07-05 ENCOUNTER — RESULTS ONLY (OUTPATIENT)
Dept: OTHER | Facility: CLINIC | Age: 63
End: 2018-07-05

## 2018-07-05 ENCOUNTER — APPOINTMENT (OUTPATIENT)
Dept: CARDIOLOGY | Facility: CLINIC | Age: 63
End: 2018-07-05
Attending: INTERNAL MEDICINE
Payer: MEDICARE

## 2018-07-05 ENCOUNTER — APPOINTMENT (OUTPATIENT)
Dept: LAB | Facility: CLINIC | Age: 63
End: 2018-07-05
Attending: INTERNAL MEDICINE
Payer: MEDICARE

## 2018-07-05 VITALS
HEIGHT: 68 IN | RESPIRATION RATE: 16 BRPM | SYSTOLIC BLOOD PRESSURE: 148 MMHG | DIASTOLIC BLOOD PRESSURE: 81 MMHG | OXYGEN SATURATION: 100 % | BODY MASS INDEX: 25.99 KG/M2 | WEIGHT: 171.52 LBS | TEMPERATURE: 97.9 F

## 2018-07-05 DIAGNOSIS — Z94.1 HEART REPLACED BY TRANSPLANT (H): ICD-10-CM

## 2018-07-05 DIAGNOSIS — E11.9 DIABETES MELLITUS, TYPE 2 (H): ICD-10-CM

## 2018-07-05 DIAGNOSIS — R09.89 OTHER SPECIFIED SYMPTOMS AND SIGNS INVOLVING THE CIRCULATORY AND RESPIRATORY SYSTEMS: ICD-10-CM

## 2018-07-05 DIAGNOSIS — Z76.82 ORGAN TRANSPLANT CANDIDATE: Primary | ICD-10-CM

## 2018-07-05 DIAGNOSIS — I25.10 CORONARY ARTERY DISEASE: ICD-10-CM

## 2018-07-05 DIAGNOSIS — N18.6 END STAGE RENAL DISEASE (H): ICD-10-CM

## 2018-07-05 DIAGNOSIS — I50.1 LEFT HEART FAILURE (H): ICD-10-CM

## 2018-07-05 LAB
ANION GAP SERPL CALCULATED.3IONS-SCNC: 12 MMOL/L (ref 3–14)
BUN SERPL-MCNC: 54 MG/DL (ref 7–30)
CALCIUM SERPL-MCNC: 8.4 MG/DL (ref 8.5–10.1)
CHLORIDE SERPL-SCNC: 101 MMOL/L (ref 94–109)
CO2 SERPL-SCNC: 22 MMOL/L (ref 20–32)
CREAT SERPL-MCNC: 6.29 MG/DL (ref 0.66–1.25)
ERYTHROCYTE [DISTWIDTH] IN BLOOD BY AUTOMATED COUNT: 18.9 % (ref 10–15)
GFR SERPL CREATININE-BSD FRML MDRD: 9 ML/MIN/1.7M2
GLUCOSE SERPL-MCNC: 192 MG/DL (ref 70–99)
HCT VFR BLD AUTO: 24.6 % (ref 40–53)
HGB BLD-MCNC: 7.9 G/DL (ref 13.3–17.7)
MAGNESIUM SERPL-MCNC: 1.7 MG/DL (ref 1.6–2.3)
MCH RBC QN AUTO: 29.2 PG (ref 26.5–33)
MCHC RBC AUTO-ENTMCNC: 32.1 G/DL (ref 31.5–36.5)
MCV RBC AUTO: 91 FL (ref 78–100)
PHOSPHATE SERPL-MCNC: 2.5 MG/DL (ref 2.5–4.5)
PLATELET # BLD AUTO: 202 10E9/L (ref 150–450)
POTASSIUM SERPL-SCNC: 5 MMOL/L (ref 3.4–5.3)
RBC # BLD AUTO: 2.71 10E12/L (ref 4.4–5.9)
SODIUM SERPL-SCNC: 136 MMOL/L (ref 133–144)
TACROLIMUS BLD-MCNC: 8.5 UG/L (ref 5–15)
TME LAST DOSE: NORMAL H
WBC # BLD AUTO: 8.4 10E9/L (ref 4–11)

## 2018-07-05 PROCEDURE — 27210982 ZZH KIT RT HC TOTES DISP CR7

## 2018-07-05 PROCEDURE — 93505 ENDOMYOCARDIAL BIOPSY: CPT | Mod: 26 | Performed by: INTERNAL MEDICINE

## 2018-07-05 PROCEDURE — 80048 BASIC METABOLIC PNL TOTAL CA: CPT | Performed by: NURSE PRACTITIONER

## 2018-07-05 PROCEDURE — 88346 IMFLUOR 1ST 1ANTB STAIN PX: CPT | Performed by: INTERNAL MEDICINE

## 2018-07-05 PROCEDURE — 93505 ENDOMYOCARDIAL BIOPSY: CPT

## 2018-07-05 PROCEDURE — 80197 ASSAY OF TACROLIMUS: CPT | Performed by: NURSE PRACTITIONER

## 2018-07-05 PROCEDURE — 86832 HLA CLASS I HIGH DEFIN QUAL: CPT | Performed by: TRANSPLANT SURGERY

## 2018-07-05 PROCEDURE — 25000125 ZZHC RX 250: Performed by: INTERNAL MEDICINE

## 2018-07-05 PROCEDURE — 88350 IMFLUOR EA ADDL 1ANTB STN PX: CPT | Performed by: INTERNAL MEDICINE

## 2018-07-05 PROCEDURE — 88307 TISSUE EXAM BY PATHOLOGIST: CPT | Performed by: INTERNAL MEDICINE

## 2018-07-05 PROCEDURE — 85027 COMPLETE CBC AUTOMATED: CPT | Performed by: NURSE PRACTITIONER

## 2018-07-05 PROCEDURE — 27210788 ZZH MANIFOLD CR3

## 2018-07-05 PROCEDURE — 27211181 ZZH BALLOON TIP PRESSURE CR5

## 2018-07-05 PROCEDURE — 84100 ASSAY OF PHOSPHORUS: CPT | Performed by: NURSE PRACTITIONER

## 2018-07-05 PROCEDURE — 36415 COLL VENOUS BLD VENIPUNCTURE: CPT | Performed by: NURSE PRACTITIONER

## 2018-07-05 PROCEDURE — 27210807 ZZH SHEATH CR6

## 2018-07-05 PROCEDURE — 86833 HLA CLASS II HIGH DEFIN QUAL: CPT | Performed by: TRANSPLANT SURGERY

## 2018-07-05 PROCEDURE — 83735 ASSAY OF MAGNESIUM: CPT | Performed by: NURSE PRACTITIONER

## 2018-07-05 PROCEDURE — 40000166 ZZH STATISTIC PP CARE STAGE 1

## 2018-07-05 PROCEDURE — 27211047 ZZH FORCEP BIOPSY CR10

## 2018-07-05 PROCEDURE — C1893 INTRO/SHEATH, FIXED,NON-PEEL: HCPCS

## 2018-07-05 RX ORDER — LIDOCAINE 40 MG/G
CREAM TOPICAL
Status: COMPLETED | OUTPATIENT
Start: 2018-07-05 | End: 2018-07-05

## 2018-07-05 RX ADMIN — LIDOCAINE: 40 CREAM TOPICAL at 07:56

## 2018-07-05 NOTE — PROGRESS NOTES
Returned from CCL s/p RHC and biopsy.  VSS.  Denies pain.  Reviewed discharge instructions with patient.  HUGO DHALIWAL.  Discharge to home.

## 2018-07-05 NOTE — IP AVS SNAPSHOT
MRN:[id number]                      After Visit Summary   7/5/2018    Murray Nicholson    MRN: [id number]           Visit Information        Department      7/5/2018  6:53 AM [address] [facility] [place]          Review of your medicines      UNREVIEWED medicines. Ask your doctor about these medicines        Dose / Directions    acetaminophen 325 MG tablet   Commonly known as:  TYLENOL   Used for:  Acute post-operative pain        Dose:  650 mg   Take 2 tablets (650 mg) by mouth every 4 hours as needed for mild pain (multimodal surgical pain management along with NSAIDS and opioid medication as indicated based on pain control and physical function.)   Quantity:  100 tablet   Refills:  0       albuterol 108 (90 Base) MCG/ACT Inhaler   Commonly known as:  PROAIR HFA/PROVENTIL HFA/VENTOLIN HFA   Used for:  Dyspnea on exertion        Dose:  6 puff   Inhale 6 puffs into the lungs every 4 hours as needed for shortness of breath / dyspnea   Quantity:  1 Inhaler   Refills:  0       allopurinol 300 MG tablet   Commonly known as:  ZYLOPRIM   Used for:  Chronic gout of wrist, unspecified cause, unspecified laterality, Gout, unspecified cause, unspecified chronicity, unspecified site        Dose:  300 mg   Take 1 tablet (300 mg) by mouth daily   Quantity:  30 tablet   Refills:  2       apixaban ANTICOAGULANT 2.5 MG tablet   Commonly known as:  ELIQUIS   Used for:  Heart transplanted (H)        Dose:  2.5 mg   Take 1 tablet (2.5 mg) by mouth 2 times daily   Quantity:  120 tablet   Refills:  0       aspirin 81 MG tablet        Take 1 tablet (81 mg) by mouth at bedtime   Refills:  0       bismuth subsalicylate 262 MG/15ML suspension   Commonly known as:  PEPTO BISMOL        Dose:  15 mL   Take 15 mLs by mouth every 6 hours as needed for indigestion   Refills:  0       cefpodoxime 200 MG tablet   Commonly known as:  VANTIN   Indication:  Possible post-op infection   Used for:  Leukocytosis, unspecified type        200 mg  after HD on Saturday 6/30/2018, Tuesday 7/3/2018 and Thursday 7/5/2018 then DC (total of 10 days).   Quantity:  10 tablet   Refills:  0       fluticasone 50 MCG/ACT spray   Commonly known as:  FLONASE   Used for:  Nasal congestion        Dose:  1-2 spray   Spray 1-2 sprays into both nostrils daily   Quantity:  16 g   Refills:  11       hydrALAZINE 100 MG Tabs tablet   Commonly known as:  APRESOLINE   Used for:  Heart transplant recipient (H)        Dose:  100 mg   Take 1 tablet (100 mg) by mouth every 6 hours   Quantity:  90 tablet   Refills:  0       * insulin isophane human 100 UNIT/ML injection   Commonly known as:  HumuLIN N PEN   Used for:  Type 2 diabetes mellitus with stage 5 chronic kidney disease not on chronic dialysis, without long-term current use of insulin (H)        Dose:  10 Units   Inject 10 Units Subcutaneous daily (with dinner)   Quantity:  3 mL   Refills:  0       * insulin isophane human 100 UNIT/ML injection   Commonly known as:  HumuLIN N PEN   Used for:  Type 2 diabetes mellitus with stage 5 chronic kidney disease not on chronic dialysis, without long-term current use of insulin (H)        Dose:  26 Units   Inject 26 Units Subcutaneous every morning (before breakfast)   Quantity:  3 mL   Refills:  0       ipratropium - albuterol 0.5 mg/2.5 mg/3 mL 0.5-2.5 (3) MG/3ML neb solution   Commonly known as:  DUONEB   Used for:  Dyspnea on exertion        Dose:  3 mL   Take 1 vial (3 mLs) by nebulization every 4 hours as needed for shortness of breath / dyspnea   Quantity:  360 mL   Refills:  0       isosorbide dinitrate 10 MG tablet   Commonly known as:  ISORDIL   Used for:  Heart transplanted (H), Hypertension goal BP (blood pressure) < 130/80        Dose:  10 mg   Take 1 tablet (10 mg) by mouth 3 times daily   Quantity:  120 tablet   Refills:  0       loratadine 10 MG tablet   Commonly known as:  CLARITIN   Used for:  Hypertensive cardiopathy, SOB (shortness of breath)        Dose:  10 mg   Take 10  mg by mouth daily as needed Reported on 5/3/2017   Refills:  0       melatonin 1 MG Tabs tablet   Used for:  Heart transplanted (H)        Dose:  1 mg   Take 1 tablet (1 mg) by mouth nightly as needed for sleep   Quantity:  120 tablet   Refills:  0       metroNIDAZOLE 250 MG tablet   Commonly known as:  FLAGYL   Indication:  Possible post-op infection   Used for:  Postoperative infection, initial encounter        Dose:  250 mg   Take 1 tablet (250 mg) by mouth every 8 hours for 4 days   Quantity:  12 tablet   Refills:  0       mycophenolate 250 MG capsule   Commonly known as:  GENERIC EQUIVALENT   Used for:  Heart transplanted (H)        Dose:  1500 mg   Take 6 capsules (1,500 mg) by mouth 2 times daily   Quantity:  360 capsule   Refills:  2       NEPHROCAPS 1 MG capsule        Dose:  1 capsule   Take 1 capsule by mouth daily   Quantity:  120 capsule   Refills:  0       nystatin 694730 UNIT/ML suspension   Commonly known as:  MYCOSTATIN   Used for:  Heart transplant recipient (H)        Dose:  3770287 Units   Swish and swallow 10 mLs (1,000,000 Units) in mouth 4 times daily   Quantity:  400 mL   Refills:  2       pantoprazole 40 MG EC tablet   Commonly known as:  PROTONIX   Used for:  Heart transplant recipient (H)        Dose:  40 mg   Take 1 tablet (40 mg) by mouth every morning   Quantity:  30 tablet   Refills:  0       pravastatin 40 MG tablet   Commonly known as:  PRAVACHOL   Used for:  Dyslipidemia        Dose:  40 mg   Take 1 tablet (40 mg) by mouth daily   Quantity:  90 tablet   Refills:  0       * predniSONE 5 MG tablet   Commonly known as:  DELTASONE   Used for:  Heart transplant recipient (H)        Dose:  15 mg   Take 3 tablets (15 mg) by mouth every evening   Quantity:  100 tablet   Refills:  0       * predniSONE 20 MG tablet   Commonly known as:  DELTASONE   Used for:  Heart transplant recipient (H)        Dose:  20 mg   Take 1 tablet (20 mg) by mouth every morning   Quantity:  30 tablet   Refills:  0        senna-docusate 8.6-50 MG per tablet   Commonly known as:  SENOKOT-S;PERICOLACE   Used for:  Constipation, unspecified constipation type        Dose:  1-2 tablet   Take 1-2 tablets by mouth 2 times daily as needed for constipation   Quantity:  100 tablet   Refills:  0       simethicone 80 MG chewable tablet   Commonly known as:  MYLICON   Used for:  Constipation, unspecified constipation type        Dose:  80 mg   Take 1 tablet (80 mg) by mouth every 6 hours as needed for cramping   Quantity:  180 tablet   Refills:  0       sulfamethoxazole-trimethoprim 400-80 MG per tablet   Commonly known as:  BACTRIM/SEPTRA   Indication:  PCP prophylaxis   Used for:  Heart transplant recipient (H)        Take 1 half-tablet by mouth daily.   Quantity:  120 tablet   Refills:  1       * tacrolimus 0.5 MG capsule   Commonly known as:  GENERIC EQUIVALENT   Used for:  Heart transplant recipient (H)        Dose:  2.5 mg   Take 5 capsules (2.5 mg) by mouth every evening   Quantity:  150 capsule   Refills:  2       * tacrolimus 1 MG capsule   Commonly known as:  GENERIC EQUIVALENT   Used for:  Heart transplant recipient (H)        Dose:  3 mg   Take 3 capsules (3 mg) by mouth every morning   Quantity:  100 capsule   Refills:  2       terbutaline 5 MG tablet   Commonly known as:  BRETHINE   Used for:  Heart transplanted (H)        Dose:  5 mg   1 tablet (5 mg) by Oral or Feeding Tube route every 8 hours   Quantity:  120 tablet   Refills:  0       traMADol 50 MG tablet   Commonly known as:  ULTRAM   Used for:  Acute post-operative pain        Dose:  50 mg   Take 1 tablet (50 mg) by mouth every 6 hours as needed for moderate pain   Quantity:  10 tablet   Refills:  0       valGANciclovir 450 MG tablet   Commonly known as:  VALCYTE   Indication:  Medication Treatment to Prevent Cytomegalovirus Disease   Used for:  Heart transplanted (H)        Dose:  450 mg   Take 1 tablet (450 mg) by mouth twice a week   Quantity:  120 tablet   Refills:   0       vancomycin   Indication:  Possible post-op infection   Used for:  Leukocytosis, unspecified type        Administer following dialysis on Tuesday 7/3, Thursday 7/5, Saturday 7/7, and last dose on Tuesday 7/10.   Quantity:  4 Dose   Refills:  0       * Notice:  This list has 6 medication(s) that are the same as other medications prescribed for you. Read the directions carefully, and ask your doctor or other care provider to review them with you.      CONTINUE these medicines which have NOT CHANGED        Dose / Directions    blood glucose monitoring lancets   Used for:  Type 2 diabetes mellitus with stage 5 chronic kidney disease not on chronic dialysis, without long-term current use of insulin (H)        Use to test blood sugar 2-3 times daily or as directed.  Ok to substitute alternative if insurance prefers.   Quantity:  102 each   Refills:  11       blood glucose monitoring test strip   Commonly known as:  no brand specified   Used for:  Type 2 diabetes mellitus with stage 5 chronic kidney disease not on chronic dialysis, without long-term current use of insulin (H)        Use to test blood sugar 2-3 times daily or as directed.   Quantity:  100 each   Refills:  11       order for DME   Used for:  CKD (chronic kidney disease) stage 5, GFR less than 15 ml/min (H)        Equipment being ordered: Commode () Treatment Diagnosis: ESRD on PD Pt has to be connected to PD all night and can not be disconnected, hence impending his mobility to go to the bathroom. At risk for infection if he does not have this equipment.   Quantity:  1 Units   Refills:  0                Protect others around you: Learn how to safely use, store and throw away your medicines at www.disposemymeds.org.         Follow-ups after your visit        Your next 10 appointments already scheduled     Jul 06, 2018  1:40 PM CDT   Office Visit with Yahir Turcios MD   [facility]    [address]  [facility] [id number] [phone]           Bring a current list of meds and any records pertaining to this visit. For Physicals, please bring immunization records and any forms needing to be filled out. Please arrive 10 minutes early to complete paperwork.            Jul 10, 2018  7:30 AM CDT   (Arrive by 7:15 AM)   RETURN HEART TRANSPLANT with VERONICA Rocha [facility] ([facility] [place])    [address]  [address] [place] [id number] [phone]            Jul 18, 2018 11:30 AM CDT   LAB with INGA LAB GOLD WAITING   [facility] [place], Lab ([facility])    [address] [facility] [address]              Please do not eat 10-12 hours before your appointment if you are coming in fasting for labs on lipids, cholesterol, or glucose (sugar). This does not apply to pregnant women. Water, hot tea and black coffee (with nothing added) are okay. Do not drink other fluids, diet soda or chew gum.            Jul 18, 2018 12:00 PM CDT   Procedure 1.5 hr with U2A ROOM 4   [address] [facility] [place] ([facility])    [address] [facility] [address]               Jul 18, 2018  1:30 PM CDT   Cath 90 Minute with UUHCVR4   [facility]    [address] [facility] [address] [phone]            Jul 20, 2018 10:00 AM CDT   Ech Complete with UCECHCR4   [facility] Echo ([facility] [place])    [address]  3rd Floor  [facility] [id number] [phone]           1. Please bring or wear a comfortable two-piece outfit. 2. You may eat, drink and take your normal medicines. 3. For any questions that cannot be answered, please contact the ordering physician            Jul 20, 2018 11:00 AM CDT   XR CHEST 2 VIEWS with UCXR1   [facility] Xray ([facility])    [address]  1st Floor  [facility] [address]   [phone]           Please bring a list of your current medicines to your exam. (Include vitamins, minerals and over-thecounter medicines.) Leave your valuables at home.  Tell your doctor if there is a chance you may be pregnant.  You do not need to do anything special for this exam.            Jul 20, 2018 12:00 PM CDT   (Arrive by 11:45 AM)   RETURN HEART TRANSPLANT with VERONICA Rocha CNP [facility]    [address] [place] [address]  [address] [place] [address]   [phone]            Jul 27, 2018  8:30 AM CDT   (Arrive by 8:15 AM)   RETURN HEART TRANSPLANT with VERONICA Rocha CNP [facility]    [address] [place] [address]  [address] [place] [address]   [phone]            Jul 27, 2018 12:00 PM CDT   Heart Cath Heart Biopsy with UUHCVR4   [facility] ([facility] [place] [place])    [address] [facility] [address]   [phone]               Care Instructions        Further instructions from your care team       [facility]                        Interventional Cardiology  Discharge Instructions   Post Right Heart Cath      AFTER YOU GO HOME:    DO drink plenty of fluids    DO resume your regular diet and medications unless otherwise instructed by your Primary Physician    Do Not scrub the procedure site vigorously    No lotion or powder to the puncture site for 3 days    CALL YOUR PRIMARY PHYSICIAN IF: You may resume all normal activity.  Monitor neck site for bleeding, swelling, or voice changes. If you notice bleeding or swelling immediately apply pressure to the site and call number below to speak with Cardiology Fellow.  If you experience any changes in your breathing you should call your doctor immediately or come to the closest  "Emergency Department.  Do not drive yourself.    ADDITIONAL INSTRUCTIONS: Medications: You are to resume all home medications including anticoagulation therapy unless otherwise advised by your primary cardiologist or nurse coordinator.    Follow Up: Per your primary cardiology team    If you have any questions or concerns regarding your procedure site please call [phone] at anytime and ask for Cardiology Fellow on call.  They are available 24 hours a day.  You may also contact the Cardiology Clinic after hours number at [phone].                                                       Telephone Numbers [phone] Monday-Friday 8:00 am to 4:30 pm    [phone] After 4:30 pm Monday-Friday, Weekends & Holidays  Ask for Interventional Cardiologist on call. Someone is on call 24 hours/day   [facility] toll free number [phone] Monday-Friday 8:00 am to 4:30 pm   [facility] Emergency Dept [phone]                    Additional Information About Your Visit        [employer] Information     [employer] gives you secure access to your electronic health record. If you see a primary care provider, you can also send messages to your care team and make appointments. If you have questions, please call your primary care clinic.  If you do not have a primary care provider, please call [phone] and they will assist you.        Care EveryWhere ID     This is your Care EveryWhere ID. This could be used by other organizations to access your [facility] medical records  [id number]        Your Vitals Were     Blood Pressure Temperature Respirations Height Weight Pulse Oximetry    117/74 97.9  F (36.6  C) 16 1.727 m (5' 8\") 77.8 kg (171 lb 8.3 oz) 100%    BMI (Body Mass Index)                   26.08 kg/m2            Primary Care Provider Office Phone # Fax #    Yahir Turcios [phone]      Equal Access to Services     JOHN NG: Aubrie Thomas, jose villa, qaybta genaro " jose crowdorothy low'aan ah. So [facility] [phone].    ATENCIÓN: Si odell lynn, tiene a ortiz disposición servicios gratuitos de asistencia lingüística. Keely al [phone].    We comply with applicable federal civil rights laws and [place] laws. We do not discriminate on the basis of race, color, national origin, age, disability, sex, sexual orientation, or gender identity.            Thank you!     Thank you for choosing [facility] for your care. Our goal is always to provide you with excellent care. Hearing back from our patients is one way we can continue to improve our services. Please take a few minutes to complete the written survey that you may receive in the mail after you visit with us. Thank you!             Medication List: This is a list of all your medications and when to take them. Check marks below indicate your daily home schedule. Keep this list as a reference.      Medications           Morning Afternoon Evening Bedtime As Needed    acetaminophen 325 MG tablet   Commonly known as:  TYLENOL   Take 2 tablets (650 mg) by mouth every 4 hours as needed for mild pain (multimodal surgical pain management along with NSAIDS and opioid medication as indicated based on pain control and physical function.)                                albuterol 108 (90 Base) MCG/ACT Inhaler   Commonly known as:  PROAIR HFA/PROVENTIL HFA/VENTOLIN HFA   Inhale 6 puffs into the lungs every 4 hours as needed for shortness of breath / dyspnea                                allopurinol 300 MG tablet   Commonly known as:  ZYLOPRIM   Take 1 tablet (300 mg) by mouth daily                                apixaban ANTICOAGULANT 2.5 MG tablet   Commonly known as:  ELIQUIS   Take 1 tablet (2.5 mg) by mouth 2 times daily                                aspirin 81 MG tablet   Take 1 tablet (81 mg) by mouth at bedtime                                bismuth subsalicylate 262 MG/15ML suspension   Commonly known as:   PEPTO BISMOL   Take 15 mLs by mouth every 6 hours as needed for indigestion                                blood glucose monitoring lancets   Use to test blood sugar 2-3 times daily or as directed.  Ok to substitute alternative if insurance prefers.                                blood glucose monitoring test strip   Commonly known as:  no brand specified   Use to test blood sugar 2-3 times daily or as directed.                                cefpodoxime 200 MG tablet   Commonly known as:  VANTIN   200 mg after HD on Saturday 6/30/2018, Tuesday 7/3/2018 and Thursday 7/5/2018 then DC (total of 10 days).                                fluticasone 50 MCG/ACT spray   Commonly known as:  FLONASE   Spray 1-2 sprays into both nostrils daily                                hydrALAZINE 100 MG Tabs tablet   Commonly known as:  APRESOLINE   Take 1 tablet (100 mg) by mouth every 6 hours                                * insulin isophane human 100 UNIT/ML injection   Commonly known as:  HumuLIN N PEN   Inject 10 Units Subcutaneous daily (with dinner)                                * insulin isophane human 100 UNIT/ML injection   Commonly known as:  HumuLIN N PEN   Inject 26 Units Subcutaneous every morning (before breakfast)                                ipratropium - albuterol 0.5 mg/2.5 mg/3 mL 0.5-2.5 (3) MG/3ML neb solution   Commonly known as:  DUONEB   Take 1 vial (3 mLs) by nebulization every 4 hours as needed for shortness of breath / dyspnea                                isosorbide dinitrate 10 MG tablet   Commonly known as:  ISORDIL   Take 1 tablet (10 mg) by mouth 3 times daily                                loratadine 10 MG tablet   Commonly known as:  CLARITIN   Take 10 mg by mouth daily as needed Reported on 5/3/2017                                melatonin 1 MG Tabs tablet   Take 1 tablet (1 mg) by mouth nightly as needed for sleep                                metroNIDAZOLE 250 MG tablet   Commonly known as:   FLAGYL   Take 1 tablet (250 mg) by mouth every 8 hours for 4 days                                mycophenolate 250 MG capsule   Commonly known as:  GENERIC EQUIVALENT   Take 6 capsules (1,500 mg) by mouth 2 times daily                                NEPHROCAPS 1 MG capsule   Take 1 capsule by mouth daily                                nystatin [id number] UNIT/ML suspension   Commonly known as:  MYCOSTATIN   Swish and swallow 10 mLs (1,000,000 Units) in mouth 4 times daily                                order for DME   Equipment being ordered: [employer] () Treatment Diagnosis: ESRD on PD Pt has to be connected to PD all night and can not be disconnected, hence impending his mobility to go to the bathroom. At risk for infection if he does not have this equipment.                                pantoprazole 40 MG EC tablet   Commonly known as:  PROTONIX   Take 1 tablet (40 mg) by mouth every morning                                pravastatin 40 MG tablet   Commonly known as:  PRAVACHOL   Take 1 tablet (40 mg) by mouth daily                                * predniSONE 5 MG tablet   Commonly known as:  DELTASONE   Take 3 tablets (15 mg) by mouth every evening                                * predniSONE 20 MG tablet   Commonly known as:  DELTASONE   Take 1 tablet (20 mg) by mouth every morning                                senna-docusate 8.6-50 MG per tablet   Commonly known as:  SENOKOT-S;PERICOLACE   Take 1-2 tablets by mouth 2 times daily as needed for constipation                                simethicone 80 MG chewable tablet   Commonly known as:  MYLICON   Take 1 tablet (80 mg) by mouth every 6 hours as needed for cramping                                sulfamethoxazole-trimethoprim 400-80 MG per tablet   Commonly known as:  BACTRIM/SEPTRA   Take 1 half-tablet by mouth daily.                                * tacrolimus 0.5 MG capsule   Commonly known as:  GENERIC EQUIVALENT   Take 5 capsules (2.5 mg) by mouth every  evening                                * tacrolimus 1 MG capsule   Commonly known as:  GENERIC EQUIVALENT   Take 3 capsules (3 mg) by mouth every morning                                terbutaline 5 MG tablet   Commonly known as:  BRETHINE   1 tablet (5 mg) by Oral or Feeding Tube route every 8 hours                                traMADol 50 MG tablet   Commonly known as:  ULTRAM   Take 1 tablet (50 mg) by mouth every 6 hours as needed for moderate pain                                valGANciclovir 450 MG tablet   Commonly known as:  VALCYTE   Take 1 tablet (450 mg) by mouth twice a week                                vancomycin   Administer following dialysis on Tuesday 7/3, Thursday 7/5, Saturday 7/7, and last dose on Tuesday 7/10.                                * Notice:  This list has 6 medication(s) that are the same as other medications prescribed for you. Read the directions carefully, and ask your doctor or other care provider to review them with you.

## 2018-07-05 NOTE — PROCEDURES
FINAL CARDIAC CATH REPORT:     PROCEDURES PERFORMED:   Endomyocardial Biopsy  Right Heart Catheterization    PHYSICIANS:  Attending Physician: Zeke Balderrama MD, PhD  Interventional Cardiology Fellow: Lui Aguilar MD  Cardiology Fellow: Jordan Urrutia MD    INDICATION:  63 year old male with ischemic/valvular cardiomyopathy who presents for routine heart biopsy per protocol after OHT 6/15/2018.    DESCRIPTION:  1. Consent obtained with discussion of risks.  All questions were answered.  2. Sterile prep and procedure.  3. Location with Sheaths:   Lf IJ  7 Fr 10 cm [short]  4. Access: Local anesthetic with lidocaine.  A standard 18 guage needle with ultrasound guidance was used to establish vascular access using a modified Seldinger technique.  5. Diagnostic Catheters:   7 Fr  [place]  6. Estimated blood loss: < 5 ml    MEDICATIONS:  Local anesthesia was given with lidocaine.  Heart rate, BP, respiration, oxygen saturation and patient responses were monitored throughout the procedure with the assistance of the RN under my supervision.    Procedures:    ENDOMYOCARDIAL BIOPSY:  1. Successful collection of 5 right ventricular endomyocardial biopsy samples.    HEMODYNAMICS:  BSA 1.9 cm2  1.  bpm  2. /81/106 mmHg  3. RA 13/7/7   4. RV 32/12  5. PA 32/17/23   6. PCW 14   7. PA sat 67.8%   8. PCW sat N/A  9. Hgb 7.9 g/dL   10. Kassi CO 8.2   11. Kassi CI 4.3   12. TD CO 8.2   13. TD CI 4.3   14. PVR 1.0   15.      Sheath Removal:  The LIJ sheath was manually removed in the cardiac catheterization laboratory.    Contrast: Isovue, 0 ml     Fluoroscopy Time: 6 min    COMPLICATIONS:  1. None    SUMMARY:   Normal left and right sided filling pressures and normal cardiac output.  Successful endomyocardial biopsy with 5 specimens sent to pathology.    PLAN:   Discharge today per protocol.  Primary team to follow up pathology results from biopsies obtained during today's procedure.    The attending interventional  cardiologist was present and supervised all critical aspects the procedure.    Findings discussed with Dr. Ernst at end of case.    See CVIS report for final draft.    Jordan Urrutia MD & Tra Miguel MD, PhD  Cardiology [place]    Zeke Balderrama MD, PhD  Cardiology Staff    ATTENDING ATTESTATION: I was present and supervised the cardiology fellow throughout the procedure and reviewed the findings with the fellow at the completion of the procedure.  I agree with the documentation above.    Zeke Balderarma MD, PhD  [facility]  Cardiovascular Division  [facility]     July 5, 2018

## 2018-07-05 NOTE — DISCHARGE INSTRUCTIONS
[facility]                        Interventional Cardiology  Discharge Instructions   Post Right Heart Cath      AFTER YOU GO HOME:    DO drink plenty of fluids    DO resume your regular diet and medications unless otherwise instructed by your Primary Physician    Do Not scrub the procedure site vigorously    No lotion or powder to the puncture site for 3 days    CALL YOUR PRIMARY PHYSICIAN IF: You may resume all normal activity.  Monitor neck site for bleeding, swelling, or voice changes. If you notice bleeding or swelling immediately apply pressure to the site and call number below to speak with Cardiology Fellow.  If you experience any changes in your breathing you should call your doctor immediately or come to the closest Emergency Department.  Do not drive yourself.    ADDITIONAL INSTRUCTIONS: Medications: You are to resume all home medications including anticoagulation therapy unless otherwise advised by your primary cardiologist or nurse coordinator.    Follow Up: Per your primary cardiology team    If you have any questions or concerns regarding your procedure site please call [phone] at anytime and ask for Cardiology Fellow on call.  They are available 24 hours a day.  You may also contact the Cardiology Clinic after hours number at [phone].                                                       Telephone Numbers [phone] Monday-Friday 8:00 am to 4:30 pm    [phone] After 4:30 pm Monday-Friday, Weekends & Holidays  Ask for Interventional Cardiologist on call. Someone is on call 24 hours/day   [facility] toll free number [phone] Monday-Friday 8:00 am to 4:30 pm   [facility] Emergency Dept [phone]

## 2018-07-05 NOTE — IP AVS SNAPSHOT
[address] [address]                                       After Visit Summary   7/5/2018    Murray Nicholson    MRN: [id number]           After Visit Summary Signature Page     I have received my discharge instructions, and my questions have been answered. I have discussed any challenges I see with this plan with the nurse or doctor.    ..........................................................................................................................................  Patient/Patient Representative Signature      ..........................................................................................................................................  Patient Representative Print Name and Relationship to Patient    ..................................................               ................................................  Date                                            Time    ..........................................................................................................................................  Reviewed by Signature/Title    ...................................................              ..............................................  Date                                                            Time

## 2018-07-05 NOTE — PROGRESS NOTES
Pt arrived the the Cardiac Catheterization Lab for a right heart cath.   7 fr sheath removed from the LIJ site. Manual pressure held until hemostasis has been obtained.  Soft, no hematoma.  No bleeding, oozing or discoloration seen.  Band Aid applied.  Pt educated to watch for any signs of bleeding, pain, or voice changes after discharge for the hospital.    Report called

## 2018-07-06 ENCOUNTER — OFFICE VISIT (OUTPATIENT)
Dept: FAMILY MEDICINE | Facility: CLINIC | Age: 63
End: 2018-07-06
Payer: MEDICARE

## 2018-07-06 ENCOUNTER — TELEPHONE (OUTPATIENT)
Dept: TRANSPLANT | Facility: CLINIC | Age: 63
End: 2018-07-06

## 2018-07-06 ENCOUNTER — ALLIED HEALTH/NURSE VISIT (OUTPATIENT)
Dept: PHARMACY | Facility: CLINIC | Age: 63
End: 2018-07-06
Payer: COMMERCIAL

## 2018-07-06 ENCOUNTER — PATIENT OUTREACH (OUTPATIENT)
Dept: CARE COORDINATION | Facility: CLINIC | Age: 63
End: 2018-07-06

## 2018-07-06 VITALS
HEIGHT: 68 IN | WEIGHT: 177 LBS | BODY MASS INDEX: 26.83 KG/M2 | OXYGEN SATURATION: 99 % | TEMPERATURE: 97.4 F | SYSTOLIC BLOOD PRESSURE: 123 MMHG | DIASTOLIC BLOOD PRESSURE: 73 MMHG | HEART RATE: 105 BPM

## 2018-07-06 DIAGNOSIS — Z79.01 LONG TERM CURRENT USE OF ANTICOAGULANT THERAPY: ICD-10-CM

## 2018-07-06 DIAGNOSIS — I50.9 HEART FAILURE (H): Primary | ICD-10-CM

## 2018-07-06 DIAGNOSIS — N18.5 TYPE 2 DIABETES MELLITUS WITH STAGE 5 CHRONIC KIDNEY DISEASE NOT ON CHRONIC DIALYSIS, WITHOUT LONG-TERM CURRENT USE OF INSULIN (H): Primary | ICD-10-CM

## 2018-07-06 DIAGNOSIS — M10.9 GOUT, UNSPECIFIED CAUSE, UNSPECIFIED CHRONICITY, UNSPECIFIED SITE: ICD-10-CM

## 2018-07-06 DIAGNOSIS — N18.5 CKD (CHRONIC KIDNEY DISEASE) STAGE 5, GFR LESS THAN 15 ML/MIN (H): ICD-10-CM

## 2018-07-06 DIAGNOSIS — Z94.1 HEART TRANSPLANT RECIPIENT (H): ICD-10-CM

## 2018-07-06 DIAGNOSIS — N18.5 CKD (CHRONIC KIDNEY DISEASE) STAGE 5, GFR LESS THAN 15 ML/MIN (H): Primary | ICD-10-CM

## 2018-07-06 DIAGNOSIS — E11.22 TYPE 2 DIABETES MELLITUS WITH STAGE 5 CHRONIC KIDNEY DISEASE NOT ON CHRONIC DIALYSIS, WITHOUT LONG-TERM CURRENT USE OF INSULIN (H): Primary | ICD-10-CM

## 2018-07-06 DIAGNOSIS — Z94.1 HEART TRANSPLANTED (H): ICD-10-CM

## 2018-07-06 LAB
COPATH REPORT: NORMAL
DONOR IDENTIFICATION: NORMAL
DSA COMMENTS: NORMAL
DSA PRESENT: NO
DSA TEST METHOD: NORMAL
ORGAN: NORMAL
PRA DONOR SPECIFIC ABY: NORMAL
SA1 CELL: NORMAL
SA1 COMMENTS: NORMAL
SA1 HI RISK ABY: NORMAL
SA1 MOD RISK ABY: NORMAL
SA1 TEST METHOD: NORMAL
SA2 CELL: NORMAL
SA2 COMMENTS: NORMAL
SA2 HI RISK ABY UA: NORMAL
SA2 MOD RISK ABY: NORMAL
SA2 TEST METHOD: NORMAL

## 2018-07-06 PROCEDURE — 99605 MTMS BY PHARM NP 15 MIN: CPT | Performed by: PHARMACIST

## 2018-07-06 PROCEDURE — 99214 OFFICE O/P EST MOD 30 MIN: CPT | Performed by: FAMILY MEDICINE

## 2018-07-06 PROCEDURE — 99607 MTMS BY PHARM ADDL 15 MIN: CPT | Performed by: PHARMACIST

## 2018-07-06 RX ORDER — TACROLIMUS 0.5 MG/1
CAPSULE ORAL
Qty: 150 CAPSULE | Refills: 2
Start: 2018-07-06 | End: 2018-07-11

## 2018-07-06 RX ORDER — TACROLIMUS 1 MG/1
3 CAPSULE ORAL 2 TIMES DAILY
Qty: 180 CAPSULE | Refills: 11 | Status: SHIPPED | OUTPATIENT
Start: 2018-07-06 | End: 2018-07-11

## 2018-07-06 RX ORDER — PREDNISONE 5 MG/1
15 TABLET ORAL 2 TIMES DAILY
Qty: 180 TABLET | Refills: 0
Start: 2018-07-06 | End: 2018-07-20

## 2018-07-06 NOTE — Clinical Note
Txp Team... 1. Pt has not been taking Allopurinol. States he is out of refills. If you would like him to continue this medication, please send over additional refills. 2. TAC level lower than goal 10-15.  3. Max dose of Hydralazine is 300mg, recommend reducing to 100mg q8 hours max.

## 2018-07-06 NOTE — PROGRESS NOTES
"  SUBJECTIVE:   Murray Nicholson is a 63 year old male who presents to clinic today for the following health issues:    Hospital Follow-up Visit:    Hospital/Nursing Home/IP Rehab Facility: [facility]  //Date of Admission: 4/16/18  Date of Discharge: 07/02/2018  Reason(s) for Admission: Endomyocardial Biopsy Right Heart Catheterization            Problems taking medications regularly:  None       Medication changes since discharge: insulin titration       Problems adhering to non-medication therapy:  None    Summary of hospitalization:  [facility] discharge summary reviewed  Diagnostic Tests/Treatments reviewed.  Follow up needed: none  Other Healthcare Providers Involved in Patient s Care:         transplant team/MTM/ dialysis  Update since discharge: improved.      Post Discharge Medication Reconciliation: discharge medications reconciled, continue medications without change.  Plan of care communicated with patient     Coding guidelines for this visit:  Type of Medical   Decision Making Face-to-Face Visit       within 7 Days of discharge Face-to-Face Visit        within 14 days of discharge   Moderate Complexity [id number]   High Complexity [id number]          He had heart transplant about 3 weeks ago. Just got out of the hospital 4 days ago afte 3 months in the hospital. Overall doing well. Mood good. Got out with friends yesterday.     Noted a it more swelling thinks due to disruption of his dialysis.     No shortness of breath nor fever nor abd pain.     stooling ok. Pain controlled.     Has numerous follow up appointment upcoming    Working with MTM on insulin titration.     OBJECTIVE: /73  Pulse 105  Temp 97.4  F (36.3  C) (Oral)  Ht 5' 8\" (1.727 m)  Wt 177 lb (80.3 kg)  SpO2 99%  BMI 26.91 kg/m2 no apparent distress   Exam:  GENERAL APPEARANCE: healthy, alert and no distress  EYES: Eyes grossly normal to inspection  RESP: lungs clear to auscultation - no rales, rhonchi " or wheezes  CV: regular rates and rhythm, normal S1 S2, no S3 or S4 and no murmur, click or rub -  ABDOMEN:  soft, nontender, no HSM or masses and bowel sounds normal  SKIN: surgical wounds are clean without sign of infection.       ICD-10-CM    1. CKD (chronic kidney disease) stage 5, GFR less than 15 ml/min (H) N18.5    2. Heart transplanted (H) Z94.1     he has follow up in place and is tolerating meds well. No sings infection. Things going well. No  Changes nor labs today.

## 2018-07-06 NOTE — MR AVS SNAPSHOT
After Visit Summary   7/6/2018    Murray Nicholson    MRN: [id number]           Patient Information     Date Of Birth          1955        Visit Information        Provider Department      7/6/2018 1:40 PM Yahir Turcios MD [facility]        Today's Diagnoses     CKD (chronic kidney disease) stage 5, GFR less than 15 ml/min (H)    -  1    Heart transplanted (H)           Follow-ups after your visit        Your next 10 appointments already scheduled     Jul 10, 2018  7:30 AM CDT   (Arrive by 7:15 AM)   RETURN HEART TRANSPLANT with VERONICA Rocha [facility] ([facility])    [address]  [address] [place] [id number] [phone]            Jul 18, 2018 11:30 AM CDT   LAB with UU LAB GOLD WAITING   [facility] Lab ([facility])    [address] [facility] [address]              Please do not eat 10-12 hours before your appointment if you are coming in fasting for labs on lipids, cholesterol, or glucose (sugar). This does not apply to pregnant women. Water, hot tea and black coffee (with nothing added) are okay. Do not drink other fluids, diet soda or chew gum.            Jul 18, 2018 12:00 PM CDT   Procedure 1.5 hr with U2A ROOM 4   [address] [facility] ([facility])    [address] [facility] [address]               Jul 18, 2018  1:30 PM CDT   Cath 90 Minute with UUHCVR4   [facility]    [address] [facility] [address] [phone]            Jul 20, 2018 10:00 AM CDT   Ech Complete with UCECHCR4    [facility] ([facility])    [address]  3rd Floor  [facility] [id number] [phone]           1. Please bring or wear a comfortable two-piece outfit. 2. You may eat, drink and take your normal  medicines. 3. For any questions that cannot be answered, please contact the ordering physician            Jul 20, 2018 11:00 AM CDT   XR CHEST 2 VIEWS with UCXR1   [facility] ([facility])    [address]  1st Floor  [facility] [id number] [phone]           Please bring a list of your current medicines to your exam. (Include vitamins, minerals and over-thecounter medicines.) Leave your valuables at home.  Tell your doctor if there is a chance you may be pregnant.  You do not need to do anything special for this exam.            Jul 20, 2018 12:00 PM CDT   (Arrive by 11:45 AM)   RETURN HEART TRANSPLANT with VERONICA Rocha CNP   [facility] ([facility])    [address] [place] [address]  [address] [place] [id number] [phone]            Jul 25, 2018  9:00 AM CDT   TELEMEDICINE with Eduardo [facility]a [facility] ([facility])    [address] [place] [address]  3rd Floor  [facility] [id number] [phone]           Note: this is not an onsite visit; there is no need to come to the facility.            Jul 27, 2018  8:30 AM CDT   (Arrive by 8:15 AM)   RETURN HEART TRANSPLANT with VERONICA Rocha CNP   [facility] ([facility])    [address] [place] [address]  [address] [place] [id number] [phone]            Jul 27, 2018 12:00 PM CDT   Heart Cath Heart Biopsy with UUHCVR4   [facility] ([facility])    [address] [facility] [id number] [phone]              Future tests that were ordered for you today     Open Future Orders        Priority Expected Expires Ordered    US Aorta/Ivc/Iliac Duplex Complete Routine  7/5/2019 7/5/2018            Who to contact     If you have questions or need follow up information about today's clinic visit or your  "schedule please contact [facility] directly at [phone].  Normal or non-critical lab and imaging results will be communicated to you by MyChart, letter or phone within 4 business days after the clinic has received the results. If you do not hear from us within 7 days, please contact the clinic through [employer] or phone. If you have a critical or abnormal lab result, we will notify you by phone as soon as possible.  Submit refill requests through [employer] or call your pharmacy and they will forward the refill request to us. Please allow 3 business days for your refill to be completed.          Additional Information About Your Visit        [employer] Information     [employer] gives you secure access to your electronic health record. If you see a primary care provider, you can also send messages to your care team and make appointments. If you have questions, please call your primary care clinic.  If you do not have a primary care provider, please call [phone] and they will assist you.        Care EveryWhere ID     This is your Care EveryWhere ID. This could be used by other organizations to access your [place] medical records  [id number]        Your Vitals Were     Pulse Temperature Height Pulse Oximetry BMI (Body Mass Index)       105 97.4  F (36.3  C) (Oral) 5' 8\" (1.727 m) 99% 26.91 kg/m2        Blood Pressure from Last 3 Encounters:   07/06/18 123/73   07/05/18 148/81   07/02/18 91/51    Weight from Last 3 Encounters:   07/06/18 177 lb (80.3 kg)   07/05/18 171 lb 8.3 oz (77.8 kg)   07/02/18 172 lb 12.8 oz (78.4 kg)              Today, you had the following     No orders found for display       Primary Care Provider Office Phone # Fax #    Yahir Turcios [phone]       [address]  [facility] [address]        Goals        Lifestyle    Increase physical activity     Notes - Note created  7/3/2018  1:51 PM by Carrie Tran, RN    Goal Statement: I will start " cardiac rehab  Measure of Success: starts cardiac rehab  Supportive Steps to Achieve: support of RN CC  Barriers: none  Strengths: willingness to participate   Date to Achieve By: 7-15-18          Equal Access to Services     JOHN HAUSER : Hadii aad ku hadroddysona Thomas, guidomerry nyjodeeha, marty kajose basiliokitricarda sousalisette jay reidalexa richmonddorothy way talha ayala. So [facility] [phone].    ATENCIÓN: Si habla español, tiene a ortiz disposición servicios gratuitos de asistencia lingüística. Llame al [phone].    We comply with applicable federal civil rights laws and [place] laws. We do not discriminate on the basis of race, color, national origin, age, disability, sex, sexual orientation, or gender identity.            Thank you!     Thank you for choosing [facility]  for your care. Our goal is always to provide you with excellent care. Hearing back from our patients is one way we can continue to improve our services. Please take a few minutes to complete the written survey that you may receive in the mail after your visit with us. Thank you!             Your Updated Medication List - Protect others around you: Learn how to safely use, store and throw away your medicines at www.disposemymeds.org.          This list is accurate as of 7/6/18  4:03 PM.  Always use your most recent med list.                   Brand Name Dispense Instructions for use Diagnosis    acetaminophen 325 MG tablet    TYLENOL    100 tablet    Take 2 tablets (650 mg) by mouth every 4 hours as needed for mild pain (multimodal surgical pain management along with NSAIDS and opioid medication as indicated based on pain control and physical function.)    Acute post-operative pain       albuterol 108 (90 Base) MCG/ACT Inhaler    PROAIR HFA/PROVENTIL HFA/VENTOLIN HFA    1 Inhaler    Inhale 6 puffs into the lungs every 4 hours as needed for shortness of breath / dyspnea    Dyspnea on exertion       allopurinol 300 MG tablet    ZYLOPRIM    30  tablet    Take 1 tablet (300 mg) by mouth daily    Chronic gout of wrist, unspecified cause, unspecified laterality, Gout, unspecified cause, unspecified chronicity, unspecified site       apixaban ANTICOAGULANT 2.5 MG tablet    ELIQUIS    120 tablet    Take 1 tablet (2.5 mg) by mouth 2 times daily    Heart transplanted (H)       aspirin 81 MG tablet      Take 1 tablet (81 mg) by mouth at bedtime        bismuth subsalicylate 262 MG/15ML suspension    PEPTO BISMOL     Take 15 mLs by mouth every 6 hours as needed for indigestion        blood glucose monitoring lancets     102 each    Use to test blood sugar 2-3 times daily or as directed.  Ok to substitute alternative if insurance prefers.    Type 2 diabetes mellitus with stage 5 chronic kidney disease not on chronic dialysis, without long-term current use of insulin (H)       blood glucose monitoring test strip    no brand specified    100 each    Use to test blood sugar 2-3 times daily or as directed.    Type 2 diabetes mellitus with stage 5 chronic kidney disease not on chronic dialysis, without long-term current use of insulin (H)       fluticasone 50 MCG/ACT spray    FLONASE    16 g    Spray 1-2 sprays into both nostrils daily    Nasal congestion       hydrALAZINE 100 MG Tabs tablet    APRESOLINE    90 tablet    Take 1 tablet (100 mg) by mouth every 6 hours    Heart transplant recipient (H)       * insulin isophane human 100 UNIT/ML injection    HumuLIN N PEN    3 mL    Inject 10 Units Subcutaneous daily (with dinner)    Type 2 diabetes mellitus with stage 5 chronic kidney disease not on chronic dialysis, without long-term current use of insulin (H)       * insulin isophane human 100 UNIT/ML injection    HumuLIN N PEN    3 mL    Inject 26 Units Subcutaneous every morning (before breakfast)    Type 2 diabetes mellitus with stage 5 chronic kidney disease not on chronic dialysis, without long-term current use of insulin (H)       isosorbide dinitrate 10 MG tablet     ISORDIL    120 tablet    Take 1 tablet (10 mg) by mouth 3 times daily    Heart transplanted (H), Hypertension goal BP (blood pressure) < 130/80       loratadine 10 MG tablet    CLARITIN     Take 10 mg by mouth daily as needed Reported on 5/3/2017    Hypertensive cardiopathy, SOB (shortness of breath)       melatonin 1 MG Tabs tablet     120 tablet    Take 1 tablet (1 mg) by mouth nightly as needed for sleep    Heart transplanted (H)       metroNIDAZOLE 250 MG tablet    FLAGYL    12 tablet    Take 1 tablet (250 mg) by mouth every 8 hours for 4 days    Postoperative infection, initial encounter       mycophenolate 250 MG capsule    GENERIC EQUIVALENT    360 capsule    Take 6 capsules (1,500 mg) by mouth 2 times daily    Heart transplanted (H)       NEPHROCAPS 1 MG capsule     120 capsule    Take 1 capsule by mouth daily        nystatin [id number] UNIT/ML suspension    MYCOSTATIN    400 mL    Swish and swallow 10 mLs (1,000,000 Units) in mouth 4 times daily    Heart transplant recipient (H)       order for DME     1 Units    Equipment being ordered: Commode () Treatment Diagnosis: ESRD on PD Pt has to be connected to PD all night and can not be disconnected, hence impending his mobility to go to the bathroom. At risk for infection if he does not have this equipment.    CKD (chronic kidney disease) stage 5, GFR less than 15 ml/min (H)       pantoprazole 40 MG EC tablet    PROTONIX    30 tablet    Take 1 tablet (40 mg) by mouth every morning    Heart transplant recipient (H)       pravastatin 40 MG tablet    PRAVACHOL    90 tablet    Take 1 tablet (40 mg) by mouth daily    Dyslipidemia       * predniSONE 5 MG tablet    DELTASONE    100 tablet    Take 3 tablets (15 mg) by mouth every evening    Heart transplant recipient (H)       * predniSONE 20 MG tablet    DELTASONE    30 tablet    Take 1 tablet (20 mg) by mouth every morning    Heart transplant recipient (H)       senna-docusate 8.6-50 MG per tablet     SENOKOT-S;PERICOLACE    100 tablet    Take 1-2 tablets by mouth 2 times daily as needed for constipation    Constipation, unspecified constipation type       simethicone 80 MG chewable tablet    MYLICON    180 tablet    Take 1 tablet (80 mg) by mouth every 6 hours as needed for cramping    Constipation, unspecified constipation type       sulfamethoxazole-trimethoprim 400-80 MG per tablet    BACTRIM/SEPTRA    120 tablet    Take 1 half-tablet by mouth daily.    Heart transplant recipient (H)       * tacrolimus 0.5 MG capsule    GENERIC EQUIVALENT    150 capsule    Take 5 capsules (2.5 mg) by mouth every evening    Heart transplant recipient (H)       * tacrolimus 1 MG capsule    GENERIC EQUIVALENT    100 capsule    Take 3 capsules (3 mg) by mouth every morning    Heart transplant recipient (H)       terbutaline 5 MG tablet    BRETHINE    120 tablet    1 tablet (5 mg) by Oral or Feeding Tube route every 8 hours    Heart transplanted (H)       traMADol 50 MG tablet    ULTRAM    10 tablet    Take 1 tablet (50 mg) by mouth every 6 hours as needed for moderate pain    Acute post-operative pain       valGANciclovir 450 MG tablet    VALCYTE    120 tablet    Take 1 tablet (450 mg) by mouth twice a week    Heart transplanted (H)       vancomycin     4 Dose    Administer following dialysis on Tuesday 7/3, Thursday 7/5, Saturday 7/7, and last dose on Tuesday 7/10.    Leukocytosis, unspecified type       * Notice:  This list has 6 medication(s) that are the same as other medications prescribed for you. Read the directions carefully, and ask your doctor or other care provider to review them with you.

## 2018-07-06 NOTE — PATIENT INSTRUCTIONS
Recommendations from today's MTM visit:                                                      1. Continue adjusting your insulin every 2-3 days as directed below:     For adjusting your NPH use the following guidelines:    If your fasting glucose is less than 100, please reduce your night time NPH by 2-4 units    If your pre-dinner glucose is less than 100, please reduce your morning NPH by 2-4 units    If your fasting glucose is  greater than 200  for 2 or more days, please increase your night time NPH by 2-4 units    2. I will talk with your transplant team about continue allopurinol for your gout.     Next MTM visit: 7/25 at 9 AM.     To schedule another MTM appointment, please call the clinic directly or you may call the MTM scheduling line at [phone] or toll-free at [phone].     My Clinical Pharmacist's contact information:                                                      It was a pleasure seeing you today!  Please feel free to contact me with any questions or concerns you have.      Eduardo Lea, PharmD  MTM Pharmacist    Phone: [phone]     You may receive a survey about the MTM services you received.  I would appreciate your feedback to help me serve you better in the future. Please fill it out and return it when you can. Your comments will be anonymous.

## 2018-07-06 NOTE — MR AVS SNAPSHOT
After Visit Summary   7/6/2018    Murray Nicholson    MRN: [id number]           Patient Information     Date Of Birth          1955        Visit Information        Provider Department      [facility]6/2018 9:30 [facility]        Today's Diagnoses     Type 2 diabetes mellitus with stage 5 chronic kidney disease not on chronic dialysis, without long-term current use of insulin (H)    -  1    Heart transplanted (H)        CKD (chronic kidney disease) stage 5, GFR less than 15 ml/min (H)        Long term current use of anticoagulant therapy        Gout, unspecified cause, unspecified chronicity, unspecified site          Care Instructions    Recommendations from today's MTM visit:                                                      1. Continue adjusting your insulin every 2-3 days as directed below:     For adjusting your NPH use the following guidelines:    If your fasting glucose is less than 100, please reduce your night time NPH by 2-4 units    If your pre-dinner glucose is less than 100, please reduce your morning NPH by 2-4 units    If your fasting glucose is  greater than 200  for 2 or more days, please increase your night time NPH by 2-4 units    2. I will talk with your transplant team about continue allopurinol for your gout.     Next MTM visit: 7/25 at 9 AM.     To schedule another MTM appointment, please call the clinic directly or you may call the MTM scheduling line at [phone] or toll-free at [phone].     My Clinical Pharmacist's contact information:                                                      It was a pleasure seeing you today!  Please feel free to contact me with any questions or concerns you have.      Eduardo Lea, PharmD  MTM Pharmacist    Phone: [phone]     You may receive a survey about the MTM services you received.  I would appreciate your feedback to help me serve you better in the future. Please fill it  out and return it when you can. Your comments will be anonymous.            Follow-ups after your visit        Your next 10 appointments already scheduled     Jul 06, 2018  1:40 PM CDT   Office Visit with Yahir Turcios MD   [facility] ([facility])    [address] [place] [address]   [phone]           Bring a current list of meds and any records pertaining to this visit. For Physicals, please bring immunization records and any forms needing to be filled out. Please arrive 10 minutes early to complete paperwork.            Jul 10, 2018  7:30 AM CDT   (Arrive by 7:15 AM)   RETURN HEART TRANSPLANT with VERONICA Rocha [facility] ([facility] [place])    [address]  [address] [place] [address] [phone]            Jul 18, 2018 11:30 AM CDT   LAB with UU LAB GOLD [facility], Lab ([facility], [facility])    [address] [facility] [address]              Please do not eat 10-12 hours before your appointment if you are coming in fasting for labs on lipids, cholesterol, or glucose (sugar). This does not apply to pregnant women. Water, hot tea and black coffee (with nothing added) are okay. Do not drink other fluids, diet soda or chew gum.            Jul 18, 2018 12:00 PM CDT   Procedure 1.5 hr with U2A ROOM 4   [address] [facility] ([facility], [facility])    [address] [address]               Jul 18, 2018  1:30 PM CDT   Cath 90 Minute with UUHCVR4   [facility] ([facility], [facility])    [address] [facility] [facility] [address] [phone]            Jul 20, 2018 10:00 AM CDT   Ech Complete with UCECHCR4   [facility] Echo ([facility] [place])    [address]  3rd Floor  [employer] [id number] [phone]           1.  Please bring or wear a comfortable two-piece outfit. 2. You may eat, drink and take your normal medicines. 3. For any questions that cannot be answered, please contact the ordering physician            Jul 20, 2018 11:00 AM CDT   XR CHEST 2 VIEWS with UCXR1   [facility] Xray ([facility])    [address]  1st Floor  [facility] [id number] [phone]           Please bring a list of your current medicines to your exam. (Include vitamins, minerals and over-thecounter medicines.) Leave your valuables at home.  Tell your doctor if there is a chance you may be pregnant.  You do not need to do anything special for this exam.            Jul 20, 2018 12:00 PM CDT   (Arrive by 11:45 AM)   RETURN HEART TRANSPLANT with VERONICA Rocha CNP   [facility] Heart Care ([facility])    [address] [place] [address]  [address] [place] [id number] [phone]            Jul 25, 2018  9:00 AM CDT   TELEMEDICINE with Eduardo Lea RPH   [facility] Medication Therapy Management ([facility])    [address] [place] [address]  3rd Floor  [facility] [address] [phone]           Note: this is not an onsite visit; there is no need to come to the facility.            Jul 27, 2018  8:30 AM CDT   (Arrive by 8:15 AM)   RETURN HEART TRANSPLANT with VERONICA Rocha CNP [facility] Care ([facility])    [address] [place] [address]  [address] [place] [id number] [phone]              Future tests that were ordered for you today     Open Future Orders        Priority Expected Expires Ordered    US Aorta/Ivc/Iliac Duplex Complete Routine  7/5/2019 7/5/2018            Who to contact     If you have questions or need follow up information about today's clinic visit or your schedule please contact [facility] MEDICATION THERAPY MANAGEMENT directly at [phone].  Normal or non-critical lab and imaging  results will be communicated to you by MyChart, letter or phone within 4 business days after the clinic has received the results. If you do not hear from us within 7 days, please contact the clinic through [email or web address] or phone. If you have a critical or abnormal lab result, we will notify you by phone as soon as possible.  Submit refill requests through [email or web address] or call your pharmacy and they will forward the refill request to us. Please allow 3 business days for your refill to be completed.          Additional Information About Your Visit        [email or web address] Information     [email or web address] gives you secure access to your electronic health record. If you see a primary care provider, you can also send messages to your care team and make appointments. If you have questions, please call your primary care clinic.  If you do not have a primary care provider, please call [phone] and they will assist you.        Care EveryWhere ID     This is your Care EveryWhere ID. This could be used by other organizations to access your [place] medical records  [id number]         Blood Pressure from Last 3 Encounters:   07/05/18 148/81   07/02/18 91/51   04/16/18 110/75    Weight from Last 3 Encounters:   07/05/18 171 lb 8.3 oz (77.8 kg)   07/02/18 172 lb 12.8 oz (78.4 kg)   06/21/18 168 lb 4.8 oz (76.3 kg)              Today, you had the following     No orders found for display       Primary Care Provider Office Phone # Fax #    Yahir Turcios [phone]       [address]  [facility] [address]        Goals        Lifestyle    Increase physical activity     Notes - Note created  7/3/2018  1:51 PM by Carrie Tran, RN    Goal Statement: I will start cardiac rehab  Measure of Success: starts cardiac rehab  Supportive Steps to Achieve: support of RN CC  Barriers: none  Strengths: willingness to participate   Date to Achieve By: 7-15-18          Equal Access to Services     JOHN HAUSER AH: Aubrie Thomas  wahunterda lurodybryan, qaybta kajose crow, jose robaan ah. So [facility] [phone].    ATENCIÓN: Si odell lynn, tiene a ortiz disposición servicios gratuitos de asistencia lingüística. Keely al [phone].    We comply with applicable federal civil rights laws and [place] laws. We do not discriminate on the basis of race, color, national origin, age, disability, sex, sexual orientation, or gender identity.            Thank you!     Thank you for choosing [facility] MEDICATION THERAPY MANAGEMENT  for your care. Our goal is always to provide you with excellent care. Hearing back from our patients is one way we can continue to improve our services. Please take a few minutes to complete the written survey that you may receive in the mail after your visit with us. Thank you!             Your Updated Medication List - Protect others around you: Learn how to safely use, store and throw away your medicines at www.disposemymeds.org.          This list is accurate as of 7/6/18 12:53 PM.  Always use your most recent med list.                   Brand Name Dispense Instructions for use Diagnosis    acetaminophen 325 MG tablet    TYLENOL    100 tablet    Take 2 tablets (650 mg) by mouth every 4 hours as needed for mild pain (multimodal surgical pain management along with NSAIDS and opioid medication as indicated based on pain control and physical function.)    Acute post-operative pain       albuterol 108 (90 Base) MCG/ACT Inhaler    PROAIR HFA/PROVENTIL HFA/VENTOLIN HFA    1 Inhaler    Inhale 6 puffs into the lungs every 4 hours as needed for shortness of breath / dyspnea    Dyspnea on exertion       allopurinol 300 MG tablet    ZYLOPRIM    30 tablet    Take 1 tablet (300 mg) by mouth daily    Chronic gout of wrist, unspecified cause, unspecified laterality, Gout, unspecified cause, unspecified chronicity, unspecified site       apixaban ANTICOAGULANT 2.5 MG tablet    ELIQUIS    120 tablet    Take 1  tablet (2.5 mg) by mouth 2 times daily    Heart transplanted (H)       aspirin 81 MG tablet      Take 1 tablet (81 mg) by mouth at bedtime        bismuth subsalicylate 262 MG/15ML suspension    PEPTO BISMOL     Take 15 mLs by mouth every 6 hours as needed for indigestion        blood glucose monitoring lancets     102 each    Use to test blood sugar 2-3 times daily or as directed.  Ok to substitute alternative if insurance prefers.    Type 2 diabetes mellitus with stage 5 chronic kidney disease not on chronic dialysis, without long-term current use of insulin (H)       blood glucose monitoring test strip    no brand specified    100 each    Use to test blood sugar 2-3 times daily or as directed.    Type 2 diabetes mellitus with stage 5 chronic kidney disease not on chronic dialysis, without long-term current use of insulin (H)       fluticasone 50 MCG/ACT spray    FLONASE    16 g    Spray 1-2 sprays into both nostrils daily    Nasal congestion       hydrALAZINE 100 MG Tabs tablet    APRESOLINE    90 tablet    Take 1 tablet (100 mg) by mouth every 6 hours    Heart transplant recipient (H)       * insulin isophane human 100 UNIT/ML injection    HumuLIN N PEN    3 mL    Inject 10 Units Subcutaneous daily (with dinner)    Type 2 diabetes mellitus with stage 5 chronic kidney disease not on chronic dialysis, without long-term current use of insulin (H)       * insulin isophane human 100 UNIT/ML injection    HumuLIN N PEN    3 mL    Inject 26 Units Subcutaneous every morning (before breakfast)    Type 2 diabetes mellitus with stage 5 chronic kidney disease not on chronic dialysis, without long-term current use of insulin (H)       isosorbide dinitrate 10 MG tablet    ISORDIL    120 tablet    Take 1 tablet (10 mg) by mouth 3 times daily    Heart transplanted (H), Hypertension goal BP (blood pressure) < 130/80       loratadine 10 MG tablet    CLARITIN     Take 10 mg by mouth daily as needed Reported on 5/3/2017     Hypertensive cardiopathy, SOB (shortness of breath)       melatonin 1 MG Tabs tablet     120 tablet    Take 1 tablet (1 mg) by mouth nightly as needed for sleep    Heart transplanted (H)       metroNIDAZOLE 250 MG tablet    FLAGYL    12 tablet    Take 1 tablet (250 mg) by mouth every 8 hours for 4 days    Postoperative infection, initial encounter       mycophenolate 250 MG capsule    GENERIC EQUIVALENT    360 capsule    Take 6 capsules (1,500 mg) by mouth 2 times daily    Heart transplanted (H)       NEPHROCAPS 1 MG capsule     120 capsule    Take 1 capsule by mouth daily        nystatin 655985 UNIT/ML suspension    MYCOSTATIN    400 mL    Swish and swallow 10 mLs (1,000,000 Units) in mouth 4 times daily    Heart transplant recipient (H)       order for DME     1 Units    Equipment being ordered: Commode () Treatment Diagnosis: ESRD on PD Pt has to be connected to PD all night and can not be disconnected, hence impending his mobility to go to the bathroom. At risk for infection if he does not have this equipment.    CKD (chronic kidney disease) stage 5, GFR less than 15 ml/min (H)       pantoprazole 40 MG EC tablet    PROTONIX    30 tablet    Take 1 tablet (40 mg) by mouth every morning    Heart transplant recipient (H)       pravastatin 40 MG tablet    PRAVACHOL    90 tablet    Take 1 tablet (40 mg) by mouth daily    Dyslipidemia       * predniSONE 5 MG tablet    DELTASONE    100 tablet    Take 3 tablets (15 mg) by mouth every evening    Heart transplant recipient (H)       * predniSONE 20 MG tablet    DELTASONE    30 tablet    Take 1 tablet (20 mg) by mouth every morning    Heart transplant recipient (H)       senna-docusate 8.6-50 MG per tablet    SENOKOT-S;PERICOLACE    100 tablet    Take 1-2 tablets by mouth 2 times daily as needed for constipation    Constipation, unspecified constipation type       simethicone 80 MG chewable tablet    MYLICON    180 tablet    Take 1 tablet (80 mg) by mouth every 6 hours  as needed for cramping    Constipation, unspecified constipation type       sulfamethoxazole-trimethoprim 400-80 MG per tablet    BACTRIM/SEPTRA    120 tablet    Take 1 half-tablet by mouth daily.    Heart transplant recipient (H)       * tacrolimus 0.5 MG capsule    GENERIC EQUIVALENT    150 capsule    Take 5 capsules (2.5 mg) by mouth every evening    Heart transplant recipient (H)       * tacrolimus 1 MG capsule    GENERIC EQUIVALENT    100 capsule    Take 3 capsules (3 mg) by mouth every morning    Heart transplant recipient (H)       terbutaline 5 MG tablet    BRETHINE    120 tablet    1 tablet (5 mg) by Oral or Feeding Tube route every 8 hours    Heart transplanted (H)       traMADol 50 MG tablet    ULTRAM    10 tablet    Take 1 tablet (50 mg) by mouth every 6 hours as needed for moderate pain    Acute post-operative pain       valGANciclovir 450 MG tablet    VALCYTE    120 tablet    Take 1 tablet (450 mg) by mouth twice a week    Heart transplanted (H)       vancomycin     4 Dose    Administer following dialysis on Tuesday 7/3, Thursday 7/5, Saturday 7/7, and last dose on Tuesday 7/10.    Leukocytosis, unspecified type       * Notice:  This list has 6 medication(s) that are the same as other medications prescribed for you. Read the directions carefully, and ask your doctor or other care provider to review them with you.

## 2018-07-06 NOTE — PROGRESS NOTES
Clinic Care Coordination Contact  [facility]/Voicemail       Clinical Data: Care Coordinator Outreach  Hospitalization 4/16-7/2/2018  1. End-stage congestive heart failure due to valvular disease or mixed etiology s/p heart transplant 6/14/18  2. Severe aortic insufficiency  3. Severe mitral regurgitation          Secondary Diagnoses:   1. Chronic renal disease on hemodialysis  2. Ascending aortic aneurysm  3. Hypertension  4. Hyperlipidemia   5. Type 2 diabetes  6.  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  Surgeon: Dr. Caputo     1. Orthotopic heart transplantation   2.  Placement of ventricular epicardial pacing leads   3.  Resection of the patient's proximal ascending aortic aneurysm.  The reinforcement of the distal aortic stump with bovine pericardial strips  4.  Removal of the right atrium thrombus          Outreach attempted x 1.  Left message on voicemail with call back information and requested return call. Left a message patient has an appointment today with Dr huang  Plan: . Care Coordinator will try to reach patient again in 1-2 business days.  Jaky Canela RN / Clinical Care Coordinator     [facility] Services    [facility]   [email or web address]  Office :  [phone] / Fax :  [phone]

## 2018-07-06 NOTE — LETTER
Health Care Home - Access Care Plan    About Me  Patient Name:  Murray Mcneil    YOB: 1955  Age:                             63 year old   Hu MRN:            [id number] Telephone Information:     Home Phone [phone]   Mobile [phone]       Address:    [address] [place] [address] Email address:  [email or web address]      Emergency Contact(s)  Name Relationship Lgl Grd Work Phone Home Phone Mobile Phone   1. GENOVEVA MCNEIL* Son   [phone]   2. TYLER COMBS Friend   [phone]   3. ABDELRAHMAN MCNEIL* Son   [phone]             Health Maintenance:      My Access Plan  Medical Emergency 911   Questions or concerns during clinic hours Primary Clinic Line, I will call the clinic directly: [facility] - [phone]   24 Hour Appointment Line [phone] or  [phone]) (toll free)   24 Hour Nurse Line [phone] (toll free)   Questions or concerns outside clinic hours 24 Hour Appointment Line, I will call the after-hours on-call line:   [facility] [phone] or [phone]) (toll-free)   Preferred Urgent Care     Preferred Hospital     Preferred [facility]     Behavioral Health Crisis Line The National Suicide Prevention Lifeline at 1-250.413.4544 or 911     My Care Team Members  Patient Care Team       Relationship Specialty Notifications Start End    Yahir Turcios MD PCP - General Family Practice  2/19/18     Phone: [phone] Fax: [phone]         [address] [facility] [address]    Arcelia Blum MD MD Cardiology  5/4/15     Phone: [phone] Fax: [phone]         [address] [facility] [address] [facility] [address]    Bobby Mcconnell MD MD Surgery  6/29/15     Phone: [phone] Fax: [phone]         [address] [place] [address] [address] [facility] [address]    Amrita Tobin, RN Nurse Coordinator  Thoracic Surgery (Cardiothoracic Vascular Surgery) Admissions 4/4/18     Phone: [phone] Pager: [phone]        Kristi Armas PA Physician Assistant Physician Assistant  5/1/18     Phone: [phone] Fax: [phone] [address] [facility] [address]    [facility] Care Coordinator   7/3/18     Ana Luisa Mcpherson MD MD Nephrology  7/11/18     Phone: [phone] Pager: [phone] Fax: [phone]        [address] [facility] [address] [facility] [address]           My Medical and Care Information  Problem List   Patient Active Problem List   Diagnosis     Esophageal reflux     Hypersomnia with sleep apnea     Allergic rhinitis     CHF (congestive heart failure) (H)     CKD (chronic kidney disease) stage 5, GFR less than 15 ml/min (H)     Hyperlipidemia LDL goal <100     Hypertension goal BP (blood pressure) < 130/80     Hypertensive cardiopathy     Tubular adenoma     Anemia of chronic disease     Bicuspid aortic valve     CAD (coronary artery disease)     Anemia in chronic renal disease     Hypertension     Dyslipidemia     MGUS (monoclonal gammopathy of unknown significance)     Ascending aortic aneurysm (H)     Type 2 diabetes mellitus with diabetic chronic kidney disease (H)     Anemia, iron deficiency     Anemia in stage 5 chronic kidney disease (H)     Fluid overload     Chronic systolic heart failure (H)     Volume overload     Peritonitis (H)     Heart failure (H)     Encounter for therapeutic drug monitoring     Heart transplanted (H)     Leukopenia      Current Medications and Allergies:  See printed Medication Report

## 2018-07-06 NOTE — LETTER
[facility] CARE COORDINATION    July 16, 2018    Murray Nicholson  [address]  [place] [address]      Dear Murray,    I am a clinic care coordinator who works with Yahir Turcios MD at [facility] . I recently tried to call and was unable to reach you. I wanted to introduce myself and provide you with my contact information so that you can call me with questions or concerns about your health care. Below is a description of clinic care coordination and how I can further assist you.     The clinic care coordinator is a registered nurse  who understand the health care system. The goal of clinic care coordination is to help you manage your health and improve access to the [facility] system in the most efficient manner. The registered nurse can assist you in meeting your health care goals by providing education, coordinating services, and strengthening the communication among your providers.   Please feel free to contact me at [phone], with any questions or concerns. We at [facility] are focused on providing you with the highest-quality healthcare experience possible and that all starts with you.     Sincerely,     Jaky Canela    Enclosed: I have enclosed a copy of a 24 Hour Access Plan. This has helpful phone numbers for you to call when needed. Please keep this in an easy to access place to use as needed.

## 2018-07-06 NOTE — TELEPHONE ENCOUNTER
Pt called with NER biopsy results - Pred decreased to 15 mg BID.   Prograf level 8.5 - dose increased from 3/2.5 to 3 mg BID.   Recheck prior clinic on 7/10/18.  No DSAs    Pt reports doing okay, -111. -140s.    Med changes confirmed; enc to call with questions.

## 2018-07-09 ENCOUNTER — TELEPHONE (OUTPATIENT)
Dept: PHARMACY | Facility: CLINIC | Age: 63
End: 2018-07-09

## 2018-07-10 ENCOUNTER — OFFICE VISIT (OUTPATIENT)
Dept: CARDIOLOGY | Facility: CLINIC | Age: 63
End: 2018-07-10
Attending: NURSE PRACTITIONER
Payer: MEDICARE

## 2018-07-10 ENCOUNTER — TELEPHONE (OUTPATIENT)
Dept: TRANSPLANT | Facility: CLINIC | Age: 63
End: 2018-07-10

## 2018-07-10 ENCOUNTER — PRE VISIT (OUTPATIENT)
Dept: TRANSPLANT | Facility: CLINIC | Age: 63
End: 2018-07-10

## 2018-07-10 VITALS
WEIGHT: 176.1 LBS | DIASTOLIC BLOOD PRESSURE: 82 MMHG | HEART RATE: 101 BPM | SYSTOLIC BLOOD PRESSURE: 133 MMHG | OXYGEN SATURATION: 100 % | HEIGHT: 69 IN | BODY MASS INDEX: 26.08 KG/M2

## 2018-07-10 DIAGNOSIS — M1A.0390 CHRONIC GOUT OF WRIST, UNSPECIFIED CAUSE, UNSPECIFIED LATERALITY: ICD-10-CM

## 2018-07-10 DIAGNOSIS — D63.1 ANEMIA IN STAGE 5 CHRONIC KIDNEY DISEASE (H): ICD-10-CM

## 2018-07-10 DIAGNOSIS — D63.8 ANEMIA IN OTHER CHRONIC DISEASES CLASSIFIED ELSEWHERE: ICD-10-CM

## 2018-07-10 DIAGNOSIS — N18.5 CKD (CHRONIC KIDNEY DISEASE) STAGE 5, GFR LESS THAN 15 ML/MIN (H): ICD-10-CM

## 2018-07-10 DIAGNOSIS — Z94.1 HEART TRANSPLANT RECIPIENT (H): ICD-10-CM

## 2018-07-10 DIAGNOSIS — Z13.220 ENCOUNTER FOR LIPID SCREENING FOR CARDIOVASCULAR DISEASE: ICD-10-CM

## 2018-07-10 DIAGNOSIS — N18.5 ANEMIA IN STAGE 5 CHRONIC KIDNEY DISEASE (H): ICD-10-CM

## 2018-07-10 DIAGNOSIS — D72.819 LEUKOPENIA, UNSPECIFIED TYPE: ICD-10-CM

## 2018-07-10 DIAGNOSIS — Z94.1 HEART TRANSPLANTED (H): Primary | ICD-10-CM

## 2018-07-10 DIAGNOSIS — Z11.59 ENCOUNTER FOR SCREENING FOR OTHER VIRAL DISEASES (CODE): ICD-10-CM

## 2018-07-10 DIAGNOSIS — I10 ESSENTIAL HYPERTENSION: ICD-10-CM

## 2018-07-10 DIAGNOSIS — I71.21 ASCENDING AORTIC ANEURYSM (H): ICD-10-CM

## 2018-07-10 DIAGNOSIS — Z13.6 ENCOUNTER FOR LIPID SCREENING FOR CARDIOVASCULAR DISEASE: ICD-10-CM

## 2018-07-10 DIAGNOSIS — Z94.1 HEART REPLACED BY TRANSPLANT (H): Primary | ICD-10-CM

## 2018-07-10 DIAGNOSIS — Z79.899 ENCOUNTER FOR LONG-TERM (CURRENT) USE OF HIGH-RISK MEDICATION: ICD-10-CM

## 2018-07-10 DIAGNOSIS — M10.9 GOUT, UNSPECIFIED CAUSE, UNSPECIFIED CHRONICITY, UNSPECIFIED SITE: ICD-10-CM

## 2018-07-10 LAB
ALP SERPL-CCNC: 175 U/L (ref 40–150)
ALT SERPL W P-5'-P-CCNC: 20 U/L (ref 0–70)
ANION GAP SERPL CALCULATED.3IONS-SCNC: 15 MMOL/L (ref 3–14)
AST SERPL W P-5'-P-CCNC: 15 U/L (ref 0–45)
BASOPHILS # BLD AUTO: 0 10E9/L (ref 0–0.2)
BASOPHILS NFR BLD AUTO: 0 %
BILIRUB SERPL-MCNC: 0.5 MG/DL (ref 0.2–1.3)
BUN SERPL-MCNC: 72 MG/DL (ref 7–30)
CALCIUM SERPL-MCNC: 9.1 MG/DL (ref 8.5–10.1)
CHLORIDE SERPL-SCNC: 102 MMOL/L (ref 94–109)
CO2 SERPL-SCNC: 17 MMOL/L (ref 20–32)
CREAT SERPL-MCNC: 7.77 MG/DL (ref 0.66–1.25)
DIFFERENTIAL METHOD BLD: ABNORMAL
EOSINOPHIL # BLD AUTO: 0 10E9/L (ref 0–0.7)
EOSINOPHIL NFR BLD AUTO: 0.5 %
ERYTHROCYTE [DISTWIDTH] IN BLOOD BY AUTOMATED COUNT: 19.7 % (ref 10–15)
FERRITIN SERPL-MCNC: 771 NG/ML (ref 26–388)
GFR SERPL CREATININE-BSD FRML MDRD: 7 ML/MIN/1.7M2
GLUCOSE SERPL-MCNC: 226 MG/DL (ref 70–99)
HCT VFR BLD AUTO: 22.4 % (ref 40–53)
HGB BLD-MCNC: 7.3 G/DL (ref 13.3–17.7)
IMM GRANULOCYTES # BLD: 0 10E9/L (ref 0–0.4)
IMM GRANULOCYTES NFR BLD: 0.9 %
IRON SATN MFR SERPL: 28 % (ref 15–46)
IRON SERPL-MCNC: 66 UG/DL (ref 35–180)
LYMPHOCYTES # BLD AUTO: 0.2 10E9/L (ref 0.8–5.3)
LYMPHOCYTES NFR BLD AUTO: 7.9 %
MAGNESIUM SERPL-MCNC: 1.8 MG/DL (ref 1.6–2.3)
MCH RBC QN AUTO: 29.9 PG (ref 26.5–33)
MCHC RBC AUTO-ENTMCNC: 32.6 G/DL (ref 31.5–36.5)
MCV RBC AUTO: 92 FL (ref 78–100)
MONOCYTES # BLD AUTO: 0.2 10E9/L (ref 0–1.3)
MONOCYTES NFR BLD AUTO: 7 %
NEUTROPHILS # BLD AUTO: 1.8 10E9/L (ref 1.6–8.3)
NEUTROPHILS NFR BLD AUTO: 83.7 %
NRBC # BLD AUTO: 0 10*3/UL
NRBC BLD AUTO-RTO: 0 /100
PHOSPHATE SERPL-MCNC: 2.2 MG/DL (ref 2.5–4.5)
PLATELET # BLD AUTO: 259 10E9/L (ref 150–450)
POTASSIUM SERPL-SCNC: ABNORMAL MMOL/L (ref 3.4–5.3)
RBC # BLD AUTO: 2.44 10E12/L (ref 4.4–5.9)
SODIUM SERPL-SCNC: 133 MMOL/L (ref 133–144)
TACROLIMUS BLD-MCNC: 7.8 UG/L (ref 5–15)
TIBC SERPL-MCNC: 238 UG/DL (ref 240–430)
TME LAST DOSE: NORMAL H
URATE SERPL-MCNC: 6.1 MG/DL (ref 3.5–7.2)
WBC # BLD AUTO: 2.2 10E9/L (ref 4–11)

## 2018-07-10 PROCEDURE — 85025 COMPLETE CBC W/AUTO DIFF WBC: CPT | Performed by: INTERNAL MEDICINE

## 2018-07-10 PROCEDURE — 83540 ASSAY OF IRON: CPT | Performed by: INTERNAL MEDICINE

## 2018-07-10 PROCEDURE — 80197 ASSAY OF TACROLIMUS: CPT | Performed by: INTERNAL MEDICINE

## 2018-07-10 PROCEDURE — 84100 ASSAY OF PHOSPHORUS: CPT | Performed by: INTERNAL MEDICINE

## 2018-07-10 PROCEDURE — 84460 ALANINE AMINO (ALT) (SGPT): CPT | Performed by: INTERNAL MEDICINE

## 2018-07-10 PROCEDURE — 83735 ASSAY OF MAGNESIUM: CPT | Performed by: INTERNAL MEDICINE

## 2018-07-10 PROCEDURE — 84450 TRANSFERASE (AST) (SGOT): CPT | Performed by: INTERNAL MEDICINE

## 2018-07-10 PROCEDURE — 82728 ASSAY OF FERRITIN: CPT | Performed by: INTERNAL MEDICINE

## 2018-07-10 PROCEDURE — 84550 ASSAY OF BLOOD/URIC ACID: CPT | Performed by: INTERNAL MEDICINE

## 2018-07-10 PROCEDURE — G0463 HOSPITAL OUTPT CLINIC VISIT: HCPCS | Mod: ZF

## 2018-07-10 PROCEDURE — 83550 IRON BINDING TEST: CPT | Performed by: INTERNAL MEDICINE

## 2018-07-10 PROCEDURE — 99214 OFFICE O/P EST MOD 30 MIN: CPT | Mod: ZP | Performed by: NURSE PRACTITIONER

## 2018-07-10 PROCEDURE — 82247 BILIRUBIN TOTAL: CPT | Performed by: INTERNAL MEDICINE

## 2018-07-10 PROCEDURE — 36415 COLL VENOUS BLD VENIPUNCTURE: CPT | Performed by: INTERNAL MEDICINE

## 2018-07-10 PROCEDURE — 80048 BASIC METABOLIC PNL TOTAL CA: CPT | Performed by: INTERNAL MEDICINE

## 2018-07-10 PROCEDURE — 84075 ASSAY ALKALINE PHOSPHATASE: CPT | Performed by: INTERNAL MEDICINE

## 2018-07-10 RX ORDER — HYDRALAZINE HYDROCHLORIDE 100 MG/1
100 TABLET, FILM COATED ORAL 3 TIMES DAILY
Qty: 90 TABLET | Refills: 11 | Status: SHIPPED | OUTPATIENT
Start: 2018-07-10 | End: 2018-07-20 | Stop reason: ALTCHOICE

## 2018-07-10 RX ORDER — MYCOPHENOLATE MOFETIL 250 MG/1
1250 CAPSULE ORAL 2 TIMES DAILY
Qty: 300 CAPSULE | Refills: 11 | Status: ON HOLD | OUTPATIENT
Start: 2018-07-10 | End: 2018-07-18

## 2018-07-10 RX ORDER — SODIUM CHLORIDE 9 MG/ML
INJECTION, SOLUTION INTRAVENOUS CONTINUOUS
Status: CANCELLED | OUTPATIENT
Start: 2018-07-10

## 2018-07-10 RX ORDER — TERBUTALINE SULFATE 5 MG/1
TABLET ORAL
Qty: 120 TABLET | Refills: 0
Start: 2018-07-10 | End: 2018-07-20

## 2018-07-10 ASSESSMENT — PAIN SCALES - GENERAL: PAINLEVEL: NO PAIN (0)

## 2018-07-10 NOTE — MR AVS SNAPSHOT
After Visit Summary   7/10/2018    Murray Nicholson    MRN: [id number]           Patient Information     Date Of Birth          1955        Visit Information        Provider Department      7/10/2018 7:30 AM Mellisa Suh, APRN CNP [facility]        Today's Diagnoses     Heart transplanted (H)    -  1    Essential hypertension        Ascending aortic aneurysm (H)        Anemia in stage 5 chronic kidney disease (H)        CKD (chronic kidney disease) stage 5, GFR less than 15 ml/min (H)        Leukopenia, unspecified type        Anemia in other chronic diseases classified elsewhere        Heart transplant recipient (H)          Care Instructions    Please call your coordinator at [phone] with any questions or concerns.      Hold Eliquis on Tuesday 7/17/18 before angiogram. Tacrolimus that night before ~1100.    Decrese Cellcept from 1500 mg twcie daily to 1250 mg twice daily.   Decrease Terbutaline to 2.5 mg three times a day.  Decrease Hydralazine to 100 mg three times daily.   Ok to try 2 mg Melatonin for sleep.  Hold Allopurinol     Lillie will call with today's tac level and any further medication changes.                Follow-ups after your visit        Your next 10 appointments already scheduled     Jul 18, 2018 11:30 AM CDT   LAB with [facility] [place] ([facility])    [address] [facility] [address]              Please do not eat 10-12 hours before your appointment if you are coming in fasting for labs on lipids, cholesterol, or glucose (sugar). This does not apply to pregnant women. Water, hot tea and black coffee (with nothing added) are okay. Do not drink other fluids, diet soda or chew gum.            Jul 18, 2018 12:00 PM CDT   Procedure 1.5 hr with U2A ROOM 4   Unit 2A [facility] ([facility])    [address] [facility] [address]                Jul 18, 2018  1:30 PM CDT   Cath 90 Minute with UUHCVR4   [facility] [place],  Heart Cath Lab ([facility])    [address] [address] [phone]            Jul 20, 2018 10:00 AM CDT   Ech Complete with UCECHCR4    [facility] ([facility])    [address] [facility]  3rd Floor  [facility] [id number] [phone]           1. Please bring or wear a comfortable two-piece outfit. 2. You may eat, drink and take your normal medicines. 3. For any questions that cannot be answered, please contact the ordering physician            Jul 20, 2018 11:00 AM CDT   XR CHEST 2 VIEWS with UCXR1   [facility] ([facility])    [address] [place] [address]  1st [facility] [id number] [phone]           Please bring a list of your current medicines to your exam. (Include vitamins, minerals and over-thecounter medicines.) Leave your valuables at home.  Tell your doctor if there is a chance you may be pregnant.  You do not need to do anything special for this exam.            Jul 20, 2018 12:00 PM CDT   (Arrive by 11:45 AM)   RETURN HEART TRANSPLANT with VERONICA Rocha CNP [facility] ([facility])    [address] [place] [address]  [address] [place] [id number] [phone]            Jul 25, 2018  9:00 AM CDT   TELEMEDICINE with Eduardo Lea [facility] ([facility])    [address] [place] [address]  3rd [facility] [id number] [phone]           Note: this is not an onsite visit; there is no need to come to the facility.            Jul 27, 2018  8:30 AM CDT   (Arrive by 8:15 AM)   RETURN HEART TRANSPLANT with VERONICA Rocha CNP [facility] ([facility])    [address] [place] [address]  [address] [place] [id number] [phone]            Jul 27, 2018  "11:00 AM CDT   Procedure - 2.5 hour with U2A ROOM 17   Unit 2A [facility] ([facility])    [address] [facility] [address]               Jul 27, 2018 12:00 PM CDT   Heart Cath Heart Biopsy with UUHCVR4   [facility] [place],  Heart Cath Lab ([facility])    [address] [facility] [address] [phone]              Who to contact     If you have questions or need follow up information about today's clinic visit or your schedule please contact [facility] directly at [phone].  Normal or non-critical lab and imaging results will be communicated to you by [employer], letter or phone within 4 business days after the clinic has received the results. If you do not hear from us within 7 days, please contact the clinic through [employer] or phone. If you have a critical or abnormal lab result, we will notify you by phone as soon as possible.  Submit refill requests through [id number] or call your pharmacy and they will forward the refill request to us. Please allow 3 business days for your refill to be completed.          Additional Information About Your Visit        1234ENTER Information     1234ENTER gives you secure access to your electronic health record. If you see a primary care provider, you can also send messages to your care team and make appointments. If you have questions, please call your primary care clinic.  If you do not have a primary care provider, please call [phone] and they will assist you.        Care EveryWhere ID     This is your Care EveryWhere ID. This could be used by other organizations to access your [facility] medical records  [id number]        Your Vitals Were     Pulse Height Pulse Oximetry BMI (Body Mass Index)          101 1.753 m (5' 9\") 100% 26.01 kg/m2         Blood Pressure from Last 3 Encounters:   07/10/18 133/82   07/06/18 123/73   07/05/18 148/81    Weight from Last 3 Encounters: "   07/10/18 79.9 kg (176 lb 1.6 oz)   07/06/18 80.3 kg (177 lb)   07/05/18 77.8 kg (171 lb 8.3 oz)              Today, you had the following     No orders found for display         Today's Medication Changes          These changes are accurate as of 7/10/18  8:27 AM.  If you have any questions, ask your nurse or doctor.               These medicines have changed or have updated prescriptions.        Dose/Directions    hydrALAZINE 100 MG Tabs tablet   Commonly known as:  APRESOLINE   This may have changed:  when to take this   Used for:  Heart transplant recipient (H)   Changed by:  Mellisa Suh APRN CNP        Dose:  100 mg   Take 1 tablet (100 mg) by mouth 3 times daily   Quantity:  90 tablet   Refills:  11       mycophenolate 250 MG capsule   Commonly known as:  GENERIC EQUIVALENT   This may have changed:  how much to take   Used for:  Heart transplanted (H)   Changed by:  Mellisa Suh APRN CNP        Dose:  1250 mg   Take 5 capsules (1,250 mg) by mouth 2 times daily   Quantity:  300 capsule   Refills:  11       terbutaline 5 MG tablet   Commonly known as:  BRETHINE   This may have changed:    - how much to take  - how to take this  - when to take this  - additional instructions   Used for:  Heart transplanted (H)   Changed by:  Mellisa Suh APRN CNP        Take half tablet 2.5 mg three times daily.   Quantity:  120 tablet   Refills:  0         Stop taking these medicines if you haven't already. Please contact your care team if you have questions.     allopurinol 300 MG tablet   Commonly known as:  ZYLOPRIM   Stopped by:  Mellisa Suh APRN CNP           vancomycin   Stopped by:  Mellisa Suh APRN CNP                Where to get your medicines      These medications were sent to [facility] - [place], [place] - [address] [place], [place] [id number]   [address] , [facility] [address]    Hours:  Mon-Fri 8:30am-5:00pm Toll Free [phone] Phone:  [phone]     hydrALAZINE 100  MG Tabs tablet    mycophenolate 250 MG capsule         Some of these will need a paper prescription and others can be bought over the counter.  Ask your nurse if you have questions.     You don't need a prescription for these medications     terbutaline 5 MG tablet                Primary Care Provider Office Phone # Fax #    Yahir Turcios [phone]       [address]  [facility] [address]        Goals        Lifestyle    Increase physical activity     Notes - Note created  7/3/2018  1:51 PM by Carrie Tran, RN    Goal Statement: I will start cardiac rehab  Measure of Success: starts cardiac rehab  Supportive Steps to Achieve: support of RN CC  Barriers: none  Strengths: willingness to participate   Date to Achieve By: 7-15-18          Equal Access to Services     JOHN HAUSER : Hadii marsha wang hadasho Sotracey, waaxda luqadaha, qaybta kaalmada adeegyada, waxay jay palencia . So [facility] [phone].    ATENCIÓN: Si habla español, tiene a ortiz disposición servicios gratuitos de asistencia lingüística. [facility] [phone].    We comply with applicable federal civil rights laws and [place] laws. We do not discriminate on the basis of race, color, national origin, age, disability, sex, sexual orientation, or gender identity.            Thank you!     Thank you for choosing [facility]  for your care. Our goal is always to provide you with excellent care. Hearing back from our patients is one way we can continue to improve our services. Please take a few minutes to complete the written survey that you may receive in the mail after your visit with us. Thank you!             Your Updated Medication List - Protect others around you: Learn how to safely use, store and throw away your medicines at www.disposemymeds.org.          This list is accurate as of 7/10/18  8:27 AM.  Always use your most recent med list.                   Brand Name Dispense Instructions for use  Diagnosis    acetaminophen 325 MG tablet    TYLENOL    100 tablet    Take 2 tablets (650 mg) by mouth every 4 hours as needed for mild pain (multimodal surgical pain management along with NSAIDS and opioid medication as indicated based on pain control and physical function.)    Acute post-operative pain       albuterol 108 (90 Base) MCG/ACT Inhaler    PROAIR HFA/PROVENTIL HFA/VENTOLIN HFA    1 Inhaler    Inhale 6 puffs into the lungs every 4 hours as needed for shortness of breath / dyspnea    Dyspnea on exertion       apixaban ANTICOAGULANT 2.5 MG tablet    ELIQUIS    120 tablet    Take 1 tablet (2.5 mg) by mouth 2 times daily    Heart transplanted (H)       aspirin 81 MG tablet      Take 1 tablet (81 mg) by mouth at bedtime        bismuth subsalicylate 262 MG/15ML suspension    PEPTO BISMOL     Take 15 mLs by mouth every 6 hours as needed for indigestion        blood glucose monitoring lancets     102 each    Use to test blood sugar 2-3 times daily or as directed.  Ok to substitute alternative if insurance prefers.    Type 2 diabetes mellitus with stage 5 chronic kidney disease not on chronic dialysis, without long-term current use of insulin (H)       blood glucose monitoring test strip    no brand specified    100 each    Use to test blood sugar 2-3 times daily or as directed.    Type 2 diabetes mellitus with stage 5 chronic kidney disease not on chronic dialysis, without long-term current use of insulin (H)       fluticasone 50 MCG/ACT spray    FLONASE    16 g    Spray 1-2 sprays into both nostrils daily    Nasal congestion       hydrALAZINE 100 MG Tabs tablet    APRESOLINE    90 tablet    Take 1 tablet (100 mg) by mouth 3 times daily    Heart transplant recipient (H)       * insulin isophane human 100 UNIT/ML injection    HumuLIN N PEN    3 mL    Inject 10 Units Subcutaneous daily (with dinner)    Type 2 diabetes mellitus with stage 5 chronic kidney disease not on chronic dialysis, without long-term current  use of insulin (H)       * insulin isophane human 100 UNIT/ML injection    HumuLIN N PEN    3 mL    Inject 26 Units Subcutaneous every morning (before breakfast)    Type 2 diabetes mellitus with stage 5 chronic kidney disease not on chronic dialysis, without long-term current use of insulin (H)       isosorbide dinitrate 10 MG tablet    ISORDIL    120 tablet    Take 1 tablet (10 mg) by mouth 3 times daily    Heart transplanted (H), Hypertension goal BP (blood pressure) < 130/80       loratadine 10 MG tablet    CLARITIN     Take 10 mg by mouth daily as needed Reported on 5/3/2017    Hypertensive cardiopathy, SOB (shortness of breath)       melatonin 1 MG Tabs tablet     120 tablet    Take 1 tablet (1 mg) by mouth nightly as needed for sleep    Heart transplanted (H)       mycophenolate 250 MG capsule    GENERIC EQUIVALENT    300 capsule    Take 5 capsules (1,250 mg) by mouth 2 times daily    Heart transplanted (H)       NEPHROCAPS 1 MG capsule     120 capsule    Take 1 capsule by mouth daily        nystatin 565578 UNIT/ML suspension    MYCOSTATIN    400 mL    Swish and swallow 10 mLs (1,000,000 Units) in mouth 4 times daily    Heart transplant recipient (H)       order for DME     1 Units    Equipment being ordered: Commode () Treatment Diagnosis: ESRD on PD Pt has to be connected to PD all night and can not be disconnected, hence impending his mobility to go to the bathroom. At risk for infection if he does not have this equipment.    CKD (chronic kidney disease) stage 5, GFR less than 15 ml/min (H)       pantoprazole 40 MG EC tablet    PROTONIX    30 tablet    Take 1 tablet (40 mg) by mouth every morning    Heart transplant recipient (H)       pravastatin 40 MG tablet    PRAVACHOL    90 tablet    Take 1 tablet (40 mg) by mouth daily    Dyslipidemia       * predniSONE 20 MG tablet    DELTASONE    30 tablet    Take 1 tablet (20 mg) by mouth every morning    Heart transplant recipient (H)       * predniSONE 5 MG  tablet    DELTASONE    180 tablet    Take 3 tablets (15 mg) by mouth 2 times daily    Heart transplant recipient (H)       senna-docusate 8.6-50 MG per tablet    SENOKOT-S;PERICOLACE    100 tablet    Take 1-2 tablets by mouth 2 times daily as needed for constipation    Constipation, unspecified constipation type       simethicone 80 MG chewable tablet    MYLICON    180 tablet    Take 1 tablet (80 mg) by mouth every 6 hours as needed for cramping    Constipation, unspecified constipation type       sulfamethoxazole-trimethoprim 400-80 MG per tablet    BACTRIM/SEPTRA    120 tablet    Take 1 half-tablet by mouth daily.    Heart transplant recipient (H)       * tacrolimus 0.5 MG capsule    GENERIC EQUIVALENT    150 capsule    ON HOLD due to dose change    Heart transplant recipient (H)       * tacrolimus 1 MG capsule    GENERIC EQUIVALENT    180 capsule    Take 3 capsules (3 mg) by mouth 2 times daily    Heart transplant recipient (H)       terbutaline 5 MG tablet    BRETHINE    120 tablet    Take half tablet 2.5 mg three times daily.    Heart transplanted (H)       traMADol 50 MG tablet    ULTRAM    10 tablet    Take 1 tablet (50 mg) by mouth every 6 hours as needed for moderate pain    Acute post-operative pain       valGANciclovir 450 MG tablet    VALCYTE    120 tablet    Take 1 tablet (450 mg) by mouth twice a week    Heart transplanted (H)       * Notice:  This list has 6 medication(s) that are the same as other medications prescribed for you. Read the directions carefully, and ask your doctor or other care provider to review them with you.

## 2018-07-10 NOTE — TELEPHONE ENCOUNTER
DATE:  7/10/2018   TIME OF RECEIPT FROM LAB:  0903  LAB TEST:  Potassium   LAB VALUE:  6.4  RESULTS GIVEN WITH READ-BACK TO (PROVIDER):  Lillie Ulloa RN  TIME LAB VALUE REPORTED TO PROVIDER:   0914
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
## 2018-07-10 NOTE — PATIENT INSTRUCTIONS
Please call your coordinator at [phone] with any questions or concerns.      Hold Eliquis on Tuesday 7/17/18 before angiogram. Tacrolimus that night before ~1100.    Decrese Cellcept from 1500 mg twcie daily to 1250 mg twice daily.   Decrease Terbutaline to 2.5 mg three times a day.  Decrease Hydralazine to 100 mg three times daily.   Ok to try 2 mg Melatonin for sleep.  Hold Allopurinol     Lillie will call with today's tac level and any further medication changes.

## 2018-07-10 NOTE — PROGRESS NOTES
Clinic Care Coordination Contact  [facility]/Voicemail       Clinical Data: Care Coordinator Outreach  Outreach attempted x 2.  Left message on voicemail with call back information and requested return call.  CC left a message that we know he is closely followed by the transplant team but to call CC for any primary needs  Plan: Care Coordinator will await a return call from the patient   Jaky Canela RN / Clinical Care Coordinator     [facility] [place]   [email or web address]  Office :  [phone] / Fax :  [phone]
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
## 2018-07-10 NOTE — NURSING NOTE
Chief Complaint   Patient presents with     Follow Up For     Heart tx 3wks     Vitals were taken and medications were reconciled.     Manuel Ames, RMA  7:44 AM

## 2018-07-10 NOTE — NURSING NOTE
"Pt seen in clinic with LEWIS Suh for week three post heart transplant. NP reviewed meds, available labs and VS journal. BPs WNL, one 152/91, prior AM pills. Hydralazine decreased to TID from QID. WBC 2.2; Cellcept decreased. CMV added. HR stable; terbut decreased. Working with Pharm D for glucose management. Pt c/o inability to sleep - \"so much to do at home\" and so many appts. NP enc to try increasing Melatonin. C/o tremors- reassurance offered. \"soft\" BMs; no diarrhea, some cramping. Appts for next week reviewed. Med changes discussed in detail. AVS reviewed and provided to pt who verbalized understanding of next steps.      ~Please call your coordinator at [phone] with any questions or concerns.    ~Hold Eliquis on Tuesday 7/17/18 before angiogram. Tacrolimus that night before ~1100.  ~Decrese Cellcept from 1500 mg twcie daily to 1250 mg twice daily.   ~Decrease Terbutaline to 2.5 mg three times a day.  ~Decrease Hydralazine to 100 mg three times daily.   ~Ok to try 2 mg Melatonin for sleep.  ~Hold Allopurinol   ~Lillie will call with today's tac level and any further medication changes.  "

## 2018-07-11 ENCOUNTER — TELEPHONE (OUTPATIENT)
Dept: TRANSPLANT | Facility: CLINIC | Age: 63
End: 2018-07-11

## 2018-07-11 DIAGNOSIS — R23.4 PEELING SKIN: ICD-10-CM

## 2018-07-11 DIAGNOSIS — E11.9 DIABETES MELLITUS, TYPE 2 (H): Primary | ICD-10-CM

## 2018-07-11 DIAGNOSIS — I82.90 CLOT: Primary | ICD-10-CM

## 2018-07-11 DIAGNOSIS — N18.6 END STAGE RENAL DISEASE (H): ICD-10-CM

## 2018-07-11 DIAGNOSIS — N18.6 ESRD (END STAGE RENAL DISEASE) (H): ICD-10-CM

## 2018-07-11 DIAGNOSIS — I82.C21 CHRONIC EMBOLISM AND THROMBOSIS OF RIGHT INTERNAL JUGULAR VEIN (H): ICD-10-CM

## 2018-07-11 DIAGNOSIS — D72.819 LEUKOPENIA: ICD-10-CM

## 2018-07-11 DIAGNOSIS — Z76.82 ORGAN TRANSPLANT CANDIDATE: ICD-10-CM

## 2018-07-11 DIAGNOSIS — Z76.82 AWAITING ORGAN TRANSPLANT: Primary | ICD-10-CM

## 2018-07-11 DIAGNOSIS — R09.89 OTHER SPECIFIED SYMPTOMS AND SIGNS INVOLVING THE CIRCULATORY AND RESPIRATORY SYSTEMS: ICD-10-CM

## 2018-07-11 DIAGNOSIS — Z94.1 HEART TRANSPLANT RECIPIENT (H): ICD-10-CM

## 2018-07-11 LAB
CMV DNA SPEC NAA+PROBE-ACNC: NORMAL [IU]/ML
CMV DNA SPEC NAA+PROBE-LOG#: NORMAL {LOG_IU}/ML
SPECIMEN SOURCE: NORMAL

## 2018-07-11 RX ORDER — TACROLIMUS 0.5 MG/1
CAPSULE ORAL
Qty: 30 CAPSULE | Refills: 11 | Status: SHIPPED | OUTPATIENT
Start: 2018-07-11 | End: 2018-07-19

## 2018-07-11 RX ORDER — ALBUTEROL SULFATE 0.83 MG/ML
2.5 SOLUTION RESPIRATORY (INHALATION) ONCE
Status: CANCELLED | OUTPATIENT
Start: 2018-07-16 | End: 2018-07-16

## 2018-07-11 RX ORDER — PENTAMIDINE ISETHIONATE 300 MG/300MG
300 INHALANT RESPIRATORY (INHALATION) ONCE
Status: CANCELLED | OUTPATIENT
Start: 2018-07-16 | End: 2018-07-16

## 2018-07-11 RX ORDER — TACROLIMUS 1 MG/1
CAPSULE ORAL
Qty: 180 CAPSULE | Refills: 11 | Status: SHIPPED | OUTPATIENT
Start: 2018-07-11 | End: 2018-07-19

## 2018-07-11 NOTE — TELEPHONE ENCOUNTER
Pt called with CMV and FK level 7.8; dose increased from 3 mg BID to 3.5/3 mg. Recheck 7/18/18     Due to low WBC, Dr Ernst requested pt DC Bactrim and do Pentamidine treatment next week.    US in Sept - cont Eiquis until then.     Next steps confirmed with pt.  States his fingers were hot and now the skin is flaking off. He will send a photo.

## 2018-07-11 NOTE — PROGRESS NOTES
Lillie Ulloa RN Griffin, Peter Alberto, [facility] Peter -     Holding Allopurinol corey on all the Pred.   Goal for tac is 10-12 - increasing dose   Hydralazine decreased to TID in clinic.     THANKS!     Lillie            Previous Messages       ----- Message -----      From: Eduardo Lea [facility]      Sent: 7/6/2018  12:51 PM        To: Lillie Ulloa RN, Klever Ernst MD     Txp Team,     I saw your patient for a medication review post transplant. Here are my recommendations/ thoughts:     1. Pt has not been taking Allopurinol. States he is out of refills. If you would like him to continue this medication, please send over additional refills.   2. TAC level lower than goal 10-15.   3. Max dose of Hydralazine is 300mg, recommend reducing to 100mg q8 hours max.     Thanks! Let me know!     Eduardo Lea, PharmD   [facility] Pharmacist     Phone: [phone]

## 2018-07-12 LAB
FUNGUS SPEC CULT: NORMAL
SPECIMEN SOURCE: NORMAL

## 2018-07-12 NOTE — TELEPHONE ENCOUNTER
July 12, 2018: I spoke with Murray to make a dermatology appt next Wednesday. I sent him the following email to confirm appts for next week:    Appointments on Wednesday, 7/18    10:15 A - fasting labs (nothing to eat after midnight) - [facility], [address] [place] [address], 1st floor  11:00 A - dermatology appt with Jennifer Reid PA-C - [facility], [address] [place] [address], 3rd floor  11:40 A - shuttle to the Hospital  12:00 P - angiogram (nothing to eat after midnight) - [facility], [address] [place] [address], 2nd floor, gold waiting rm    Appointments on Friday, 7/20  8:00 A - pentamidine infusion - [facility] [place], [address] [place] [address], 2nd floor  10:00 A - echocardiogram  - [facility], [address] [place] [address], 3rd floor  11:00 A - chest x-ray - [facility], [address] [place] [address], 1st floor  12:00 P - Appt with Mellisa Suh NP - [facility] [place], [address] [place] [address], 3rd floor    Please let me know if you have any questions    Sincerely,    Roxana Lebron  Transplant   Phone [phone]  Fax [phone]  [email or web address]

## 2018-07-16 NOTE — PROGRESS NOTES
No return call from the patient [facility] letter mailed today .  PCP notified     Jaky Canela RN / Clinical Care Coordinator     [facility]   [email or web address]  Office :  [phone] / Fax :  [phone]

## 2018-07-16 NOTE — PROGRESS NOTES
Reminder message sent to pt via [employer] re: cors/RHC/biopsy/IVUS scheduled for Wednesday. Included instructions and callback number should pt have questions.

## 2018-07-18 ENCOUNTER — RESULTS ONLY (OUTPATIENT)
Dept: OTHER | Facility: CLINIC | Age: 63
End: 2018-07-18

## 2018-07-18 ENCOUNTER — OFFICE VISIT (OUTPATIENT)
Dept: DERMATOLOGY | Facility: CLINIC | Age: 63
End: 2018-07-18
Payer: MEDICARE

## 2018-07-18 ENCOUNTER — APPOINTMENT (OUTPATIENT)
Dept: CARDIOLOGY | Facility: CLINIC | Age: 63
End: 2018-07-18
Attending: INTERNAL MEDICINE
Payer: MEDICARE

## 2018-07-18 ENCOUNTER — TELEPHONE (OUTPATIENT)
Dept: TRANSPLANT | Facility: CLINIC | Age: 63
End: 2018-07-18

## 2018-07-18 ENCOUNTER — APPOINTMENT (OUTPATIENT)
Dept: MEDSURG UNIT | Facility: CLINIC | Age: 63
End: 2018-07-18
Attending: NURSE PRACTITIONER
Payer: MEDICARE

## 2018-07-18 ENCOUNTER — HOSPITAL ENCOUNTER (OUTPATIENT)
Facility: CLINIC | Age: 63
Discharge: HOME | End: 2018-07-18
Attending: INTERNAL MEDICINE | Admitting: INTERNAL MEDICINE
Payer: MEDICARE

## 2018-07-18 VITALS
DIASTOLIC BLOOD PRESSURE: 66 MMHG | HEART RATE: 99 BPM | TEMPERATURE: 98.1 F | SYSTOLIC BLOOD PRESSURE: 125 MMHG | RESPIRATION RATE: 18 BRPM | OXYGEN SATURATION: 100 %

## 2018-07-18 DIAGNOSIS — Z94.1 HEART REPLACED BY TRANSPLANT (H): Primary | ICD-10-CM

## 2018-07-18 DIAGNOSIS — Z79.899 ENCOUNTER FOR LONG-TERM (CURRENT) USE OF HIGH-RISK MEDICATION: ICD-10-CM

## 2018-07-18 DIAGNOSIS — Z76.82 AWAITING ORGAN TRANSPLANT: ICD-10-CM

## 2018-07-18 DIAGNOSIS — Z94.1 HEART REPLACED BY TRANSPLANT (H): ICD-10-CM

## 2018-07-18 DIAGNOSIS — Z94.1 HEART TRANSPLANTED (H): ICD-10-CM

## 2018-07-18 DIAGNOSIS — N18.6 ESRD (END STAGE RENAL DISEASE) (H): ICD-10-CM

## 2018-07-18 DIAGNOSIS — I50.20 SYSTOLIC HEART FAILURE (H): ICD-10-CM

## 2018-07-18 DIAGNOSIS — L85.3 XEROSIS OF SKIN: ICD-10-CM

## 2018-07-18 DIAGNOSIS — B35.1 ONYCHOMYCOSIS: ICD-10-CM

## 2018-07-18 DIAGNOSIS — L60.3 BRITTLE NAILS: Primary | ICD-10-CM

## 2018-07-18 DIAGNOSIS — Z94.1 HISTORY OF HEART TRANSPLANT (H): ICD-10-CM

## 2018-07-18 DIAGNOSIS — Z13.6 ENCOUNTER FOR LIPID SCREENING FOR CARDIOVASCULAR DISEASE: ICD-10-CM

## 2018-07-18 DIAGNOSIS — Z13.220 ENCOUNTER FOR LIPID SCREENING FOR CARDIOVASCULAR DISEASE: ICD-10-CM

## 2018-07-18 DIAGNOSIS — Z11.59 ENCOUNTER FOR SCREENING FOR OTHER VIRAL DISEASES (CODE): ICD-10-CM

## 2018-07-18 DIAGNOSIS — Z98.890 STATUS POST CORONARY ANGIOGRAM: Primary | ICD-10-CM

## 2018-07-18 LAB
ALBUMIN SERPL-MCNC: 2.9 G/DL (ref 3.4–5)
ALBUMIN UR-MCNC: 100 MG/DL
ALP SERPL-CCNC: 140 U/L (ref 40–150)
ALT SERPL W P-5'-P-CCNC: 24 U/L (ref 0–70)
ANION GAP SERPL CALCULATED.3IONS-SCNC: 8 MMOL/L (ref 3–14)
ANION GAP SERPL CALCULATED.3IONS-SCNC: 9 MMOL/L (ref 3–14)
APPEARANCE UR: ABNORMAL
AST SERPL W P-5'-P-CCNC: 14 U/L (ref 0–45)
BACTERIA #/AREA URNS HPF: ABNORMAL /HPF
BASOPHILS # BLD AUTO: 0 10E9/L (ref 0–0.2)
BASOPHILS NFR BLD AUTO: 0 %
BILIRUB SERPL-MCNC: 0.5 MG/DL (ref 0.2–1.3)
BILIRUB UR QL STRIP: NEGATIVE
BUN SERPL-MCNC: 48 MG/DL (ref 7–30)
BUN SERPL-MCNC: 56 MG/DL (ref 7–30)
CALCIUM SERPL-MCNC: 8.3 MG/DL (ref 8.5–10.1)
CALCIUM SERPL-MCNC: 8.7 MG/DL (ref 8.5–10.1)
CHLORIDE SERPL-SCNC: 101 MMOL/L (ref 94–109)
CHLORIDE SERPL-SCNC: 101 MMOL/L (ref 94–109)
CHOLEST SERPL-MCNC: 175 MG/DL
CK SERPL-CCNC: 131 U/L (ref 30–300)
CO2 SERPL-SCNC: 26 MMOL/L (ref 20–32)
CO2 SERPL-SCNC: 27 MMOL/L (ref 20–32)
COLOR UR AUTO: YELLOW
CREAT SERPL-MCNC: 4.82 MG/DL (ref 0.66–1.25)
CREAT SERPL-MCNC: 5.53 MG/DL (ref 0.66–1.25)
DIFFERENTIAL METHOD BLD: ABNORMAL
EOSINOPHIL # BLD AUTO: 0 10E9/L (ref 0–0.7)
EOSINOPHIL NFR BLD AUTO: 0 %
ERYTHROCYTE [DISTWIDTH] IN BLOOD BY AUTOMATED COUNT: 21.2 % (ref 10–15)
GFR SERPL CREATININE-BSD FRML MDRD: 10 ML/MIN/1.7M2
GFR SERPL CREATININE-BSD FRML MDRD: 12 ML/MIN/1.7M2
GLUCOSE BLDC GLUCOMTR-MCNC: 176 MG/DL (ref 70–99)
GLUCOSE SERPL-MCNC: 145 MG/DL (ref 70–99)
GLUCOSE SERPL-MCNC: 191 MG/DL (ref 70–99)
GLUCOSE UR STRIP-MCNC: 50 MG/DL
HBA1C MFR BLD: 7 % (ref 0–5.6)
HBV CORE AB SERPL QL IA: NONREACTIVE
HBV SURFACE AG SERPL QL IA: NONREACTIVE
HCT VFR BLD AUTO: 23.6 % (ref 40–53)
HCV AB SERPL QL IA: NONREACTIVE
HDLC SERPL-MCNC: 92 MG/DL
HGB BLD-MCNC: 7.5 G/DL (ref 13.3–17.7)
HGB UR QL STRIP: ABNORMAL
HIV 1+2 AB+HIV1 P24 AG SERPL QL IA: NONREACTIVE
HYALINE CASTS #/AREA URNS LPF: 3 /LPF (ref 0–2)
IMM GRANULOCYTES # BLD: 0 10E9/L (ref 0–0.4)
IMM GRANULOCYTES NFR BLD: 2.9 %
KCT BLD-ACNC: 164 SEC (ref 75–150)
KCT BLD-ACNC: 172 SEC (ref 75–150)
KETONES UR STRIP-MCNC: 5 MG/DL
LDLC SERPL CALC-MCNC: 66 MG/DL
LEUKOCYTE ESTERASE UR QL STRIP: ABNORMAL
LYMPHOCYTES # BLD AUTO: 0.2 10E9/L (ref 0.8–5.3)
LYMPHOCYTES NFR BLD AUTO: 21.6 %
MAGNESIUM SERPL-MCNC: 1.6 MG/DL (ref 1.6–2.3)
MCH RBC QN AUTO: 30.1 PG (ref 26.5–33)
MCHC RBC AUTO-ENTMCNC: 31.8 G/DL (ref 31.5–36.5)
MCV RBC AUTO: 95 FL (ref 78–100)
MONOCYTES # BLD AUTO: 0.1 10E9/L (ref 0–1.3)
MONOCYTES NFR BLD AUTO: 12.7 %
MUCOUS THREADS #/AREA URNS LPF: PRESENT /LPF
NEUTROPHILS # BLD AUTO: 0.6 10E9/L (ref 1.6–8.3)
NEUTROPHILS NFR BLD AUTO: 62.8 %
NITRATE UR QL: NEGATIVE
NONHDLC SERPL-MCNC: 84 MG/DL
NRBC # BLD AUTO: 0 10*3/UL
NRBC BLD AUTO-RTO: 0 /100
PH UR STRIP: 6 PH (ref 5–7)
PHOSPHATE SERPL-MCNC: 2.2 MG/DL (ref 2.5–4.5)
PLATELET # BLD AUTO: 237 10E9/L (ref 150–450)
POTASSIUM SERPL-SCNC: 5.3 MMOL/L (ref 3.4–5.3)
POTASSIUM SERPL-SCNC: 5.7 MMOL/L (ref 3.4–5.3)
PROT SERPL-MCNC: 6.3 G/DL (ref 6.8–8.8)
RBC # BLD AUTO: 2.49 10E12/L (ref 4.4–5.9)
RBC #/AREA URNS AUTO: 4 /HPF (ref 0–2)
SODIUM SERPL-SCNC: 136 MMOL/L (ref 133–144)
SODIUM SERPL-SCNC: 137 MMOL/L (ref 133–144)
SOURCE: ABNORMAL
SP GR UR STRIP: 1.02 (ref 1–1.03)
SQUAMOUS #/AREA URNS AUTO: 1 /HPF (ref 0–1)
TACROLIMUS BLD-MCNC: 7.5 UG/L (ref 5–15)
TME LAST DOSE: NORMAL H
TRANS CELLS #/AREA URNS HPF: 1 /HPF
TRIGL SERPL-MCNC: 88 MG/DL
UROBILINOGEN UR STRIP-MCNC: 2 MG/DL (ref 0–2)
WBC # BLD AUTO: 1.1 10E9/L (ref 4–11)
WBC #/AREA URNS AUTO: 12 /HPF (ref 0–5)

## 2018-07-18 PROCEDURE — 86832 HLA CLASS I HIGH DEFIN QUAL: CPT | Performed by: TRANSPLANT SURGERY

## 2018-07-18 PROCEDURE — 93456 R HRT CORONARY ARTERY ANGIO: CPT

## 2018-07-18 PROCEDURE — 27211047 ZZH FORCEP BIOPSY CR10

## 2018-07-18 PROCEDURE — 80197 ASSAY OF TACROLIMUS: CPT | Performed by: NURSE PRACTITIONER

## 2018-07-18 PROCEDURE — 25000132 ZZH RX MED GY IP 250 OP 250 PS 637: Mod: GY | Performed by: INTERNAL MEDICINE

## 2018-07-18 PROCEDURE — C1753 CATH, INTRAVAS ULTRASOUND: HCPCS

## 2018-07-18 PROCEDURE — 25000128 H RX IP 250 OP 636: Performed by: HOSPITALIST

## 2018-07-18 PROCEDURE — 88307 TISSUE EXAM BY PATHOLOGIST: CPT | Performed by: INTERNAL MEDICINE

## 2018-07-18 PROCEDURE — 96375 TX/PRO/DX INJ NEW DRUG ADDON: CPT

## 2018-07-18 PROCEDURE — 87389 HIV-1 AG W/HIV-1&-2 AB AG IA: CPT | Performed by: NURSE PRACTITIONER

## 2018-07-18 PROCEDURE — 27210762 ZZH DEVICE SUTURELESS SECUREMENT EA CR2

## 2018-07-18 PROCEDURE — 82962 GLUCOSE BLOOD TEST: CPT

## 2018-07-18 PROCEDURE — 27210843 ZZH DEVICE COMPRESSION CR12

## 2018-07-18 PROCEDURE — 25000125 ZZHC RX 250: Performed by: HOSPITALIST

## 2018-07-18 PROCEDURE — 93505 ENDOMYOCARDIAL BIOPSY: CPT | Mod: 26 | Performed by: INTERNAL MEDICINE

## 2018-07-18 PROCEDURE — G0499 HEPB SCREEN HIGH RISK INDIV: HCPCS | Performed by: NURSE PRACTITIONER

## 2018-07-18 PROCEDURE — 93505 ENDOMYOCARDIAL BIOPSY: CPT | Mod: XU

## 2018-07-18 PROCEDURE — 99152 MOD SED SAME PHYS/QHP 5/>YRS: CPT

## 2018-07-18 PROCEDURE — 27210946 ZZH KIT HC TOTES DISP CR8

## 2018-07-18 PROCEDURE — 96365 THER/PROPH/DIAG IV INF INIT: CPT | Mod: 59

## 2018-07-18 PROCEDURE — 40000172 ZZH STATISTIC PROCEDURE PREP ONLY

## 2018-07-18 PROCEDURE — 25000128 H RX IP 250 OP 636: Performed by: INTERNAL MEDICINE

## 2018-07-18 PROCEDURE — 85347 COAGULATION TIME ACTIVATED: CPT

## 2018-07-18 PROCEDURE — 27211181 ZZH BALLOON TIP PRESSURE CR5

## 2018-07-18 PROCEDURE — 86833 HLA CLASS II HIGH DEFIN QUAL: CPT | Performed by: TRANSPLANT SURGERY

## 2018-07-18 PROCEDURE — 87101 SKIN FUNGI CULTURE: CPT | Performed by: PHYSICIAN ASSISTANT

## 2018-07-18 PROCEDURE — C1894 INTRO/SHEATH, NON-LASER: HCPCS

## 2018-07-18 PROCEDURE — C1893 INTRO/SHEATH, FIXED,NON-PEEL: HCPCS

## 2018-07-18 PROCEDURE — C1769 GUIDE WIRE: HCPCS

## 2018-07-18 PROCEDURE — 27211089 ZZH KIT ACIST INJECTOR CR3

## 2018-07-18 PROCEDURE — 80048 BASIC METABOLIC PNL TOTAL CA: CPT | Performed by: INTERNAL MEDICINE

## 2018-07-18 PROCEDURE — 99153 MOD SED SAME PHYS/QHP EA: CPT

## 2018-07-18 PROCEDURE — G0472 HEP C SCREEN HIGH RISK/OTHER: HCPCS | Performed by: NURSE PRACTITIONER

## 2018-07-18 PROCEDURE — 86704 HEP B CORE ANTIBODY TOTAL: CPT | Performed by: NURSE PRACTITIONER

## 2018-07-18 PROCEDURE — 88346 IMFLUOR 1ST 1ANTB STAIN PX: CPT | Performed by: INTERNAL MEDICINE

## 2018-07-18 PROCEDURE — 27210787 ZZH MANIFOLD CR2

## 2018-07-18 PROCEDURE — 40000065 ZZH STATISTIC EKG NON-CHARGEABLE

## 2018-07-18 PROCEDURE — 93010 ELECTROCARDIOGRAM REPORT: CPT | Performed by: INTERNAL MEDICINE

## 2018-07-18 PROCEDURE — 25800025 ZZH RX 258: Performed by: INTERNAL MEDICINE

## 2018-07-18 PROCEDURE — 87799 DETECT AGENT NOS DNA QUANT: CPT | Performed by: NURSE PRACTITIONER

## 2018-07-18 PROCEDURE — 88350 IMFLUOR EA ADDL 1ANTB STN PX: CPT | Performed by: INTERNAL MEDICINE

## 2018-07-18 PROCEDURE — 93005 ELECTROCARDIOGRAM TRACING: CPT

## 2018-07-18 PROCEDURE — 27210998 ZZH ACCESS HEART CATH CR6

## 2018-07-18 PROCEDURE — 92978 ENDOLUMINL IVUS OCT C 1ST: CPT

## 2018-07-18 PROCEDURE — 96374 THER/PROPH/DIAG INJ IV PUSH: CPT

## 2018-07-18 PROCEDURE — C1887 CATHETER, GUIDING: HCPCS

## 2018-07-18 RX ORDER — ONDANSETRON 2 MG/ML
4 INJECTION INTRAMUSCULAR; INTRAVENOUS EVERY 4 HOURS PRN
Status: DISCONTINUED | OUTPATIENT
Start: 2018-07-18 | End: 2018-07-18 | Stop reason: HOSPADM

## 2018-07-18 RX ORDER — PRASUGREL 10 MG/1
10-60 TABLET, FILM COATED ORAL
Status: DISCONTINUED | OUTPATIENT
Start: 2018-07-18 | End: 2018-07-18 | Stop reason: HOSPADM

## 2018-07-18 RX ORDER — DEXTROSE MONOHYDRATE 25 G/50ML
50 INJECTION, SOLUTION INTRAVENOUS ONCE
Status: COMPLETED | OUTPATIENT
Start: 2018-07-18 | End: 2018-07-18

## 2018-07-18 RX ORDER — VERAPAMIL HYDROCHLORIDE 2.5 MG/ML
1-5 INJECTION, SOLUTION INTRAVENOUS
Status: DISCONTINUED | OUTPATIENT
Start: 2018-07-18 | End: 2018-07-18 | Stop reason: HOSPADM

## 2018-07-18 RX ORDER — CLOPIDOGREL BISULFATE 75 MG/1
75 TABLET ORAL
Status: DISCONTINUED | OUTPATIENT
Start: 2018-07-18 | End: 2018-07-18 | Stop reason: HOSPADM

## 2018-07-18 RX ORDER — NALOXONE HYDROCHLORIDE 0.4 MG/ML
.2-.4 INJECTION, SOLUTION INTRAMUSCULAR; INTRAVENOUS; SUBCUTANEOUS
Status: DISCONTINUED | OUTPATIENT
Start: 2018-07-18 | End: 2018-07-18 | Stop reason: HOSPADM

## 2018-07-18 RX ORDER — ASPIRIN 81 MG/1
81-324 TABLET, CHEWABLE ORAL
Status: DISCONTINUED | OUTPATIENT
Start: 2018-07-18 | End: 2018-07-18 | Stop reason: HOSPADM

## 2018-07-18 RX ORDER — EPTIFIBATIDE 2 MG/ML
180 INJECTION, SOLUTION INTRAVENOUS EVERY 10 MIN PRN
Status: DISCONTINUED | OUTPATIENT
Start: 2018-07-18 | End: 2018-07-18 | Stop reason: HOSPADM

## 2018-07-18 RX ORDER — ACETAMINOPHEN 325 MG/1
650 TABLET ORAL EVERY 4 HOURS PRN
Status: DISCONTINUED | OUTPATIENT
Start: 2018-07-18 | End: 2018-07-18 | Stop reason: HOSPADM

## 2018-07-18 RX ORDER — SODIUM CHLORIDE 9 MG/ML
INJECTION, SOLUTION INTRAVENOUS CONTINUOUS
Status: DISCONTINUED | OUTPATIENT
Start: 2018-07-18 | End: 2018-07-18 | Stop reason: HOSPADM

## 2018-07-18 RX ORDER — LIDOCAINE HYDROCHLORIDE 10 MG/ML
30 INJECTION, SOLUTION EPIDURAL; INFILTRATION; INTRACAUDAL; PERINEURAL
Status: DISCONTINUED | OUTPATIENT
Start: 2018-07-18 | End: 2018-07-18

## 2018-07-18 RX ORDER — DOPAMINE HYDROCHLORIDE 160 MG/100ML
2-20 INJECTION, SOLUTION INTRAVENOUS CONTINUOUS PRN
Status: DISCONTINUED | OUTPATIENT
Start: 2018-07-18 | End: 2018-07-18 | Stop reason: HOSPADM

## 2018-07-18 RX ORDER — IOPAMIDOL 755 MG/ML
50 INJECTION, SOLUTION INTRAVASCULAR ONCE
Status: COMPLETED | OUTPATIENT
Start: 2018-07-18 | End: 2018-07-18

## 2018-07-18 RX ORDER — NITROGLYCERIN 5 MG/ML
100-500 VIAL (ML) INTRAVENOUS
Status: DISCONTINUED | OUTPATIENT
Start: 2018-07-18 | End: 2018-07-18

## 2018-07-18 RX ORDER — ADENOSINE 3 MG/ML
12-12000 INJECTION, SOLUTION INTRAVENOUS
Status: DISCONTINUED | OUTPATIENT
Start: 2018-07-18 | End: 2018-07-18 | Stop reason: HOSPADM

## 2018-07-18 RX ORDER — HYDRALAZINE HYDROCHLORIDE 20 MG/ML
10-20 INJECTION INTRAMUSCULAR; INTRAVENOUS
Status: DISCONTINUED | OUTPATIENT
Start: 2018-07-18 | End: 2018-07-18 | Stop reason: HOSPADM

## 2018-07-18 RX ORDER — FUROSEMIDE 10 MG/ML
20-100 INJECTION INTRAMUSCULAR; INTRAVENOUS
Status: DISCONTINUED | OUTPATIENT
Start: 2018-07-18 | End: 2018-07-18 | Stop reason: HOSPADM

## 2018-07-18 RX ORDER — FLUMAZENIL 0.1 MG/ML
0.2 INJECTION, SOLUTION INTRAVENOUS
Status: DISCONTINUED | OUTPATIENT
Start: 2018-07-18 | End: 2018-07-18 | Stop reason: HOSPADM

## 2018-07-18 RX ORDER — POTASSIUM CHLORIDE 7.45 MG/ML
10 INJECTION INTRAVENOUS
Status: DISCONTINUED | OUTPATIENT
Start: 2018-07-18 | End: 2018-07-18 | Stop reason: HOSPADM

## 2018-07-18 RX ORDER — EPINEPHRINE 1 MG/ML
0.3 INJECTION, SOLUTION, CONCENTRATE INTRAVENOUS
Status: DISCONTINUED | OUTPATIENT
Start: 2018-07-18 | End: 2018-07-18 | Stop reason: HOSPADM

## 2018-07-18 RX ORDER — NICARDIPINE HYDROCHLORIDE 2.5 MG/ML
100 INJECTION INTRAVENOUS
Status: DISCONTINUED | OUTPATIENT
Start: 2018-07-18 | End: 2018-07-18 | Stop reason: HOSPADM

## 2018-07-18 RX ORDER — DEXTROSE MONOHYDRATE 25 G/50ML
25-50 INJECTION, SOLUTION INTRAVENOUS
Status: DISCONTINUED | OUTPATIENT
Start: 2018-07-18 | End: 2018-07-18 | Stop reason: HOSPADM

## 2018-07-18 RX ORDER — PROTAMINE SULFATE 10 MG/ML
25-100 INJECTION, SOLUTION INTRAVENOUS EVERY 5 MIN PRN
Status: DISCONTINUED | OUTPATIENT
Start: 2018-07-18 | End: 2018-07-18 | Stop reason: HOSPADM

## 2018-07-18 RX ORDER — ASPIRIN 325 MG
325 TABLET ORAL
Status: DISCONTINUED | OUTPATIENT
Start: 2018-07-18 | End: 2018-07-18 | Stop reason: HOSPADM

## 2018-07-18 RX ORDER — DEXTROSE MONOHYDRATE 25 G/50ML
12.5-5 INJECTION, SOLUTION INTRAVENOUS EVERY 30 MIN PRN
Status: DISCONTINUED | OUTPATIENT
Start: 2018-07-18 | End: 2018-07-18 | Stop reason: HOSPADM

## 2018-07-18 RX ORDER — ENALAPRILAT 1.25 MG/ML
1.25-2.5 INJECTION INTRAVENOUS
Status: DISCONTINUED | OUTPATIENT
Start: 2018-07-18 | End: 2018-07-18 | Stop reason: HOSPADM

## 2018-07-18 RX ORDER — ARGATROBAN 1 MG/ML
350 INJECTION, SOLUTION INTRAVENOUS
Status: DISCONTINUED | OUTPATIENT
Start: 2018-07-18 | End: 2018-07-18 | Stop reason: HOSPADM

## 2018-07-18 RX ORDER — SODIUM NITROPRUSSIDE 25 MG/ML
100-200 INJECTION INTRAVENOUS
Status: DISCONTINUED | OUTPATIENT
Start: 2018-07-18 | End: 2018-07-18 | Stop reason: HOSPADM

## 2018-07-18 RX ORDER — DOBUTAMINE HYDROCHLORIDE 200 MG/100ML
2-20 INJECTION INTRAVENOUS CONTINUOUS PRN
Status: DISCONTINUED | OUTPATIENT
Start: 2018-07-18 | End: 2018-07-18 | Stop reason: HOSPADM

## 2018-07-18 RX ORDER — NICOTINE POLACRILEX 4 MG
15-30 LOZENGE BUCCAL
Status: DISCONTINUED | OUTPATIENT
Start: 2018-07-18 | End: 2018-07-18 | Stop reason: HOSPADM

## 2018-07-18 RX ORDER — CLOPIDOGREL BISULFATE 75 MG/1
300-600 TABLET ORAL
Status: DISCONTINUED | OUTPATIENT
Start: 2018-07-18 | End: 2018-07-18 | Stop reason: HOSPADM

## 2018-07-18 RX ORDER — NITROGLYCERIN 20 MG/100ML
.07-2 INJECTION INTRAVENOUS CONTINUOUS PRN
Status: DISCONTINUED | OUTPATIENT
Start: 2018-07-18 | End: 2018-07-18 | Stop reason: HOSPADM

## 2018-07-18 RX ORDER — ATROPINE SULFATE 0.1 MG/ML
0.5 INJECTION INTRAVENOUS EVERY 5 MIN PRN
Status: DISCONTINUED | OUTPATIENT
Start: 2018-07-18 | End: 2018-07-18 | Stop reason: HOSPADM

## 2018-07-18 RX ORDER — PHENYLEPHRINE HCL IN 0.9% NACL 1 MG/10 ML
20-100 SYRINGE (ML) INTRAVENOUS
Status: DISCONTINUED | OUTPATIENT
Start: 2018-07-18 | End: 2018-07-18 | Stop reason: HOSPADM

## 2018-07-18 RX ORDER — PROTAMINE SULFATE 10 MG/ML
1-5 INJECTION, SOLUTION INTRAVENOUS
Status: DISCONTINUED | OUTPATIENT
Start: 2018-07-18 | End: 2018-07-18 | Stop reason: HOSPADM

## 2018-07-18 RX ORDER — TRIAMCINOLONE ACETONIDE 1 MG/G
OINTMENT TOPICAL 2 TIMES DAILY
Qty: 80 G | Refills: 0 | Status: SHIPPED | OUTPATIENT
Start: 2018-07-18 | End: 2019-03-13

## 2018-07-18 RX ORDER — LORAZEPAM 2 MG/ML
.5-2 INJECTION INTRAMUSCULAR EVERY 4 HOURS PRN
Status: DISCONTINUED | OUTPATIENT
Start: 2018-07-18 | End: 2018-07-18 | Stop reason: HOSPADM

## 2018-07-18 RX ORDER — NALOXONE HYDROCHLORIDE 0.4 MG/ML
0.4 INJECTION, SOLUTION INTRAMUSCULAR; INTRAVENOUS; SUBCUTANEOUS EVERY 5 MIN PRN
Status: DISCONTINUED | OUTPATIENT
Start: 2018-07-18 | End: 2018-07-18 | Stop reason: HOSPADM

## 2018-07-18 RX ORDER — POTASSIUM CHLORIDE 29.8 MG/ML
20 INJECTION INTRAVENOUS
Status: DISCONTINUED | OUTPATIENT
Start: 2018-07-18 | End: 2018-07-18 | Stop reason: HOSPADM

## 2018-07-18 RX ORDER — MAGNESIUM HYDROXIDE 1200 MG/15ML
1000 LIQUID ORAL CONTINUOUS PRN
Status: DISCONTINUED | OUTPATIENT
Start: 2018-07-18 | End: 2018-07-18 | Stop reason: HOSPADM

## 2018-07-18 RX ORDER — EPTIFIBATIDE 2 MG/ML
2 INJECTION, SOLUTION INTRAVENOUS CONTINUOUS PRN
Status: DISCONTINUED | OUTPATIENT
Start: 2018-07-18 | End: 2018-07-18 | Stop reason: HOSPADM

## 2018-07-18 RX ORDER — DIPHENHYDRAMINE HYDROCHLORIDE 25 MG/1
5 TABLET ORAL DAILY
Qty: 30 CAPSULE | Refills: 3 | Status: SHIPPED | OUTPATIENT
Start: 2018-07-18 | End: 2018-10-16

## 2018-07-18 RX ORDER — NIFEDIPINE 10 MG/1
10 CAPSULE ORAL
Status: DISCONTINUED | OUTPATIENT
Start: 2018-07-18 | End: 2018-07-18 | Stop reason: HOSPADM

## 2018-07-18 RX ORDER — NITROGLYCERIN 0.4 MG/1
0.4 TABLET SUBLINGUAL EVERY 5 MIN PRN
Status: DISCONTINUED | OUTPATIENT
Start: 2018-07-18 | End: 2018-07-18 | Stop reason: HOSPADM

## 2018-07-18 RX ORDER — FENTANYL CITRATE 50 UG/ML
25-50 INJECTION, SOLUTION INTRAMUSCULAR; INTRAVENOUS
Status: DISCONTINUED | OUTPATIENT
Start: 2018-07-18 | End: 2018-07-18 | Stop reason: HOSPADM

## 2018-07-18 RX ORDER — METHYLPREDNISOLONE SODIUM SUCCINATE 125 MG/2ML
125 INJECTION, POWDER, LYOPHILIZED, FOR SOLUTION INTRAMUSCULAR; INTRAVENOUS
Status: DISCONTINUED | OUTPATIENT
Start: 2018-07-18 | End: 2018-07-18 | Stop reason: HOSPADM

## 2018-07-18 RX ORDER — NITROGLYCERIN 5 MG/ML
100-200 VIAL (ML) INTRAVENOUS
Status: DISCONTINUED | OUTPATIENT
Start: 2018-07-18 | End: 2018-07-18 | Stop reason: HOSPADM

## 2018-07-18 RX ORDER — HEPARIN SODIUM 1000 [USP'U]/ML
1000-10000 INJECTION, SOLUTION INTRAVENOUS; SUBCUTANEOUS EVERY 5 MIN PRN
Status: DISCONTINUED | OUTPATIENT
Start: 2018-07-18 | End: 2018-07-18 | Stop reason: HOSPADM

## 2018-07-18 RX ORDER — NALOXONE HYDROCHLORIDE 0.4 MG/ML
.1-.4 INJECTION, SOLUTION INTRAMUSCULAR; INTRAVENOUS; SUBCUTANEOUS
Status: DISCONTINUED | OUTPATIENT
Start: 2018-07-18 | End: 2018-07-18 | Stop reason: HOSPADM

## 2018-07-18 RX ORDER — ATROPINE SULFATE 0.1 MG/ML
.5-1 INJECTION INTRAVENOUS
Status: DISCONTINUED | OUTPATIENT
Start: 2018-07-18 | End: 2018-07-18 | Stop reason: HOSPADM

## 2018-07-18 RX ORDER — DIPHENHYDRAMINE HYDROCHLORIDE 50 MG/ML
25-50 INJECTION INTRAMUSCULAR; INTRAVENOUS
Status: DISCONTINUED | OUTPATIENT
Start: 2018-07-18 | End: 2018-07-18 | Stop reason: HOSPADM

## 2018-07-18 RX ORDER — METOPROLOL TARTRATE 1 MG/ML
5 INJECTION, SOLUTION INTRAVENOUS EVERY 5 MIN PRN
Status: DISCONTINUED | OUTPATIENT
Start: 2018-07-18 | End: 2018-07-18 | Stop reason: HOSPADM

## 2018-07-18 RX ORDER — MYCOPHENOLATE MOFETIL 250 MG/1
750 CAPSULE ORAL 2 TIMES DAILY
Qty: 180 CAPSULE | Refills: 11 | Status: ON HOLD | OUTPATIENT
Start: 2018-07-18 | End: 2018-08-06

## 2018-07-18 RX ORDER — LIDOCAINE 40 MG/G
CREAM TOPICAL
Status: DISCONTINUED | OUTPATIENT
Start: 2018-07-18 | End: 2018-07-18 | Stop reason: HOSPADM

## 2018-07-18 RX ORDER — ARGATROBAN 1 MG/ML
150 INJECTION, SOLUTION INTRAVENOUS
Status: DISCONTINUED | OUTPATIENT
Start: 2018-07-18 | End: 2018-07-18 | Stop reason: HOSPADM

## 2018-07-18 RX ADMIN — NICARDIPINE HYDROCHLORIDE 200 MCG: 2.5 INJECTION INTRAVENOUS at 17:07

## 2018-07-18 RX ADMIN — FENTANYL CITRATE 50 MCG: 50 INJECTION, SOLUTION INTRAMUSCULAR; INTRAVENOUS at 16:41

## 2018-07-18 RX ADMIN — HUMAN INSULIN 10 UNITS: 100 INJECTION, SOLUTION SUBCUTANEOUS at 15:23

## 2018-07-18 RX ADMIN — HEPARIN SODIUM 1500 UNITS: 1000 INJECTION, SOLUTION INTRAVENOUS; SUBCUTANEOUS at 16:29

## 2018-07-18 RX ADMIN — MIDAZOLAM 1 MG: 1 INJECTION INTRAMUSCULAR; INTRAVENOUS at 16:41

## 2018-07-18 RX ADMIN — HEPARIN SODIUM 500 UNITS: 1000 INJECTION, SOLUTION INTRAVENOUS; SUBCUTANEOUS at 16:29

## 2018-07-18 RX ADMIN — FENTANYL CITRATE 50 MCG: 50 INJECTION, SOLUTION INTRAMUSCULAR; INTRAVENOUS at 17:12

## 2018-07-18 RX ADMIN — NITROGLYCERIN 200 MCG: 5 INJECTION, SOLUTION INTRAVENOUS at 17:08

## 2018-07-18 RX ADMIN — SODIUM CHLORIDE: 9 INJECTION, SOLUTION INTRAVENOUS at 15:47

## 2018-07-18 RX ADMIN — LIDOCAINE HYDROCHLORIDE 30 ML: 10 INJECTION, SOLUTION EPIDURAL; INFILTRATION; INTRACAUDAL; PERINEURAL at 16:29

## 2018-07-18 RX ADMIN — MIDAZOLAM 1 MG: 1 INJECTION INTRAMUSCULAR; INTRAVENOUS at 16:56

## 2018-07-18 RX ADMIN — DEXTROSE MONOHYDRATE 50 G: 25 INJECTION, SOLUTION INTRAVENOUS at 15:25

## 2018-07-18 RX ADMIN — HEPARIN SODIUM 8000 UNITS: 1000 INJECTION, SOLUTION INTRAVENOUS; SUBCUTANEOUS at 17:08

## 2018-07-18 RX ADMIN — MAGNESIUM SULFATE HEPTAHYDRATE 2 G: 40 INJECTION, SOLUTION INTRAVENOUS at 15:48

## 2018-07-18 RX ADMIN — IOPAMIDOL 73 ML: 755 INJECTION, SOLUTION INTRAVASCULAR at 16:30

## 2018-07-18 ASSESSMENT — PAIN SCALES - GENERAL: PAINLEVEL: NO PAIN (0)

## 2018-07-18 NOTE — MR AVS SNAPSHOT
After Visit Summary   7/18/2018    Murray Nicholson    MRN: [id number]           Patient Information     Date Of Birth          1955        Visit Information        Provider Department      7/18/2018 11:00 AM Jennifer Reid PA-C [facility]        Today's Diagnoses     Brittle nails    -  1    Onychomycosis        Xerosis of skin        History of heart transplant (H)           Follow-ups after your visit        Follow-up notes from your care team     Return in about 1 year (around 7/18/2019).      Your next 10 appointments already scheduled     Jul 18, 2018  1:30 PM CDT   Cath 90 Minute with [id number]   [facility] [place],  Heart Cath Lab ([facility], [facility])    [address] [address] [phone]            Jul 20, 2018  8:00 AM CDT   Infusion 60 with UC SPEC INFUSION, UC 47 ATC   [facility] Specialty and Procedure ([facility])    [address]  [address] [place] [id number] [phone]            Jul 20, 2018  9:15 AM CDT   LAB with UC LAB   [facility] ([facility])    [address]  1st Floor  [facility] [address] [phone]           Please do not eat 10-12 hours before your appointment if you are coming in fasting for labs on lipids, cholesterol, or glucose (sugar). This does not apply to pregnant women. Water, hot tea and black coffee (with nothing added) are okay. Do not drink other fluids, diet soda or chew gum.            Jul 20, 2018 10:00 AM CDT   Ech Complete with [id number]   [facility] Echo ([facility])    [address]  3rd Floor  [facility] [id number] [phone]           1.  Please bring or wear a comfortable two-piece outfit. 2.  You may eat, drink and take your normal medicines. 3.  For any questions that cannot be answered, please contact the ordering physician 4.  Please do not  wear perfumes or scented lotions on the day of your exam.            Jul 20, 2018 11:00 AM CDT   XR CHEST 2 VIEWS with UCXR1   [facility] Xray ([facility])    [address] [place] [id number] [phone]           Please bring a list of your current medicines to your exam. (Include vitamins, minerals and over-thecounter medicines.) Leave your valuables at home.  Tell your doctor if there is a chance you may be pregnant.  You do not need to do anything special for this exam.            Jul 20, 2018 12:00 PM CDT   (Arrive by 11:45 AM)   RETURN HEART TRANSPLANT with VERONICA Rocha [facility] ([facility])    [address] [place] [address]  [address]  [facility] [address] [phone]            Jul 25, 2018  9:00 AM CDT   [facility]    [address] [place] [address] [phone]           Note: this is not an onsite visit; there is no need to come to the facility.            Jul 27, 2018  8:30 AM CDT   Lab with  LAB   [facility] Lab ([facility])    [address] [place] [address] [phone]            Jul 27, 2018 11:00 AM CDT   Procedure - 2.5 hour with U2A ROOM 17   [address] [facility] ([facility] [place])    [address] [address]                 Who to contact     Please call your clinic at [phone] to:    Ask questions about your health    Make or cancel appointments    Discuss your medicines    Learn about your test results    Speak to your doctor            Additional Information About Your Visit        MyChart Information     [employer] gives you secure access to your electronic health record. If you see a primary care provider, you can also send messages  to your care team and make appointments. If you have questions, please call your primary care clinic.  If you do not have a primary care provider, please call [phone] and they will assist you.      [employer] is an electronic gateway that provides easy, online access to your medical records. With [employer], you can request a clinic appointment, read your test results, renew a prescription or communicate with your care team.     To access your existing account, please contact your [facility] or call [phone] for assistance.        Care EveryWhere ID     This is your Care EveryWhere ID. This could be used by other organizations to access your [facility] medical records  [id number]         Blood Pressure from Last 3 Encounters:   07/18/18 120/78   07/10/18 133/82   07/06/18 123/73    Weight from Last 3 Encounters:   07/10/18 79.9 kg (176 lb 1.6 oz)   07/06/18 80.3 kg (177 lb)   07/05/18 77.8 kg (171 lb 8.3 oz)              We Performed the Following     Fungus skin hair nail culture          Today's Medication Changes          These changes are accurate as of 7/18/18 11:50 AM.  If you have any questions, ask your nurse or doctor.               Start taking these medicines.        Dose/Directions    biotin 5 MG Caps   Commonly known as:  BIOTIN 5000   Used for:  Brittle nails   Started by:  Jennifer Reid PA-C        Dose:  5 mg   Take 5 mg by mouth daily   Quantity:  30 capsule   Refills:  3       triamcinolone 0.1 % ointment   Commonly known as:  KENALOG   Used for:  Xerosis of skin   Started by:  Jennifer Reid PA-C        Apply topically 2 times daily   Quantity:  80 g   Refills:  0            Where to get your medicines      These medications were sent to [facility] - [place], [place] - [facility] [address] [address]   [address] , [facility] [address]    Hours:  Mon-Fri 8:30am-5:00pm Toll Free [phone] Phone:  [phone]     biotin 5 MG Caps     triamcinolone 0.1 % ointment                Primary Care Provider Office Phone # Fax #    Yahir Turcios [phone]       [address]  [facility] [address]        Goals        Lifestyle    Increase physical activity     Notes - Note created  7/3/2018  1:51 PM by Carrie Tran RN    Goal Statement: I will start cardiac rehab  Measure of Success: starts cardiac rehab  Supportive Steps to Achieve: support of RN CC  Barriers: none  Strengths: willingness to participate   Date to Achieve By: 7-15-18          Equal Access to Services     [facility]: Hadii aad ku hadasho Soomaali, waaxda luqadaha, qaybta kaalmada adeegyada, waxay idiin hayaan adedorothy kharafaby laRachaelaaalexa . So [facility] [phone].    ATENCIÓN: Si habla español, tiene a ortiz disposición servicios gratuitos de asistencia lingüística. [facility] [phone].    We comply with applicable federal civil rights laws and [place] laws. We do not discriminate on the basis of race, color, national origin, age, disability, sex, sexual orientation, or gender identity.            Thank you!     Thank you for choosing [facility]ERMAT[facility]  for your care. Our goal is always to provide you with excellent care. Hearing back from our patients is one way we can continue to improve our services. Please take a few minutes to complete the written survey that you may receive in the mail after your visit with us. Thank you!             Your Updated Medication List - Protect others around you: Learn how to safely use, store and throw away your medicines at www.disposemymeds.org.          This list is accurate as of 7/18/18 11:50 AM.  Always use your most recent med list.                   Brand Name Dispense Instructions for use Diagnosis    acetaminophen 325 MG tablet    TYLENOL    100 tablet    Take 2 tablets (650 mg) by mouth every 4 hours as needed for mild pain (multimodal surgical pain management along with NSAIDS and opioid medication as indicated based  on pain control and physical function.)    Acute post-operative pain       albuterol 108 (90 Base) MCG/ACT Inhaler    PROAIR HFA/PROVENTIL HFA/VENTOLIN HFA    1 Inhaler    Inhale 6 puffs into the lungs every 4 hours as needed for shortness of breath / dyspnea    Dyspnea on exertion       apixaban ANTICOAGULANT 2.5 MG tablet    ELIQUIS    120 tablet    Take 1 tablet (2.5 mg) by mouth 2 times daily    Heart transplanted (H)       aspirin 81 MG tablet      Take 1 tablet (81 mg) by mouth at bedtime        biotin 5 MG Caps    BIOTIN 5000    30 capsule    Take 5 mg by mouth daily    Brittle nails       bismuth subsalicylate 262 MG/15ML suspension    PEPTO BISMOL     Take 15 mLs by mouth every 6 hours as needed for indigestion        blood glucose monitoring lancets     102 each    Use to test blood sugar 2-3 times daily or as directed.  Ok to substitute alternative if insurance prefers.    Type 2 diabetes mellitus with stage 5 chronic kidney disease not on chronic dialysis, without long-term current use of insulin (H)       blood glucose monitoring test strip    no brand specified    100 each    Use to test blood sugar 2-3 times daily or as directed.    Type 2 diabetes mellitus with stage 5 chronic kidney disease not on chronic dialysis, without long-term current use of insulin (H)       fluticasone 50 MCG/ACT spray    FLONASE    16 g    Spray 1-2 sprays into both nostrils daily    Nasal congestion       hydrALAZINE 100 MG Tabs tablet    APRESOLINE    90 tablet    Take 1 tablet (100 mg) by mouth 3 times daily    Heart transplant recipient (H)       * insulin isophane human 100 UNIT/ML injection    HumuLIN N PEN    3 mL    Inject 10 Units Subcutaneous daily (with dinner)    Type 2 diabetes mellitus with stage 5 chronic kidney disease not on chronic dialysis, without long-term current use of insulin (H)       * insulin isophane human 100 UNIT/ML injection    HumuLIN N PEN    3 mL    Inject 26 Units Subcutaneous every  morning (before breakfast)    Type 2 diabetes mellitus with stage 5 chronic kidney disease not on chronic dialysis, without long-term current use of insulin (H)       isosorbide dinitrate 10 MG tablet    ISORDIL    120 tablet    Take 1 tablet (10 mg) by mouth 3 times daily    Heart transplanted (H), Hypertension goal BP (blood pressure) < 130/80       loratadine 10 MG tablet    CLARITIN     Take 10 mg by mouth daily as needed Reported on 5/3/2017    Hypertensive cardiopathy, SOB (shortness of breath)       melatonin 1 MG Tabs tablet     120 tablet    Take 1 tablet (1 mg) by mouth nightly as needed for sleep    Heart transplanted (H)       mycophenolate 250 MG capsule    GENERIC EQUIVALENT    300 capsule    Take 5 capsules (1,250 mg) by mouth 2 times daily    Heart transplanted (H)       NEPHROCAPS 1 MG capsule     120 capsule    Take 1 capsule by mouth daily        nystatin 154044 UNIT/ML suspension    MYCOSTATIN    400 mL    Swish and swallow 10 mLs (1,000,000 Units) in mouth 4 times daily    Heart transplant recipient (H)       order for DME     1 Units    Equipment being ordered: Carol () Treatment Diagnosis: ESRD on PD Pt has to be connected to PD all night and can not be disconnected, hence impending his mobility to go to the bathroom. At risk for infection if he does not have this equipment.    CKD (chronic kidney disease) stage 5, GFR less than 15 ml/min (H)       pantoprazole 40 MG EC tablet    PROTONIX    30 tablet    Take 1 tablet (40 mg) by mouth every morning    Heart transplant recipient (H)       pravastatin 40 MG tablet    PRAVACHOL    90 tablet    Take 1 tablet (40 mg) by mouth daily    Dyslipidemia       * predniSONE 20 MG tablet    DELTASONE    30 tablet    Take 1 tablet (20 mg) by mouth every morning    Heart transplant recipient (H)       * predniSONE 5 MG tablet    DELTASONE    180 tablet    Take 3 tablets (15 mg) by mouth 2 times daily    Heart transplant recipient (H)        senna-docusate 8.6-50 MG per tablet    SENOKOT-S;PERICOLACE    100 tablet    Take 1-2 tablets by mouth 2 times daily as needed for constipation    Constipation, unspecified constipation type       simethicone 80 MG chewable tablet    MYLICON    180 tablet    Take 1 tablet (80 mg) by mouth every 6 hours as needed for cramping    Constipation, unspecified constipation type       * tacrolimus 0.5 MG capsule    GENERIC EQUIVALENT    30 capsule    Take one capsule with three 1 mg capsules (3.5 mg) every AM    Heart transplant recipient (H)       * tacrolimus 1 MG capsule    GENERIC EQUIVALENT    180 capsule    Take three capsules with one 0.5 mg capsule (3.5 mg) every AM and three capsules (3 mg) every PM    Heart transplant recipient (H)       terbutaline 5 MG tablet    BRETHINE    120 tablet    Take half tablet 2.5 mg three times daily.    Heart transplanted (H)       traMADol 50 MG tablet    ULTRAM    10 tablet    Take 1 tablet (50 mg) by mouth every 6 hours as needed for moderate pain    Acute post-operative pain       triamcinolone 0.1 % ointment    KENALOG    80 g    Apply topically 2 times daily    Xerosis of skin       valGANciclovir 450 MG tablet    VALCYTE    120 tablet    Take 1 tablet (450 mg) by mouth twice a week    Heart transplanted (H)       * Notice:  This list has 6 medication(s) that are the same as other medications prescribed for you. Read the directions carefully, and ask your doctor or other care provider to review them with you.

## 2018-07-18 NOTE — PROCEDURES
CARDIAC CATH REPORT:   PROCEDURES PERFORMED:   Endomyocardial Biopsy  Right Heart Catheterization  Coronary Angiography  Intravascular Ultrasound (IVUS)    PHYSICIANS:  Attending Physician: Jordan Fox MD  Interventional Cardiology Fellow: Lui Aguilar MD  Cardiology Fellow: Jordan Urrutia MD    INDICATION:  Murray Nicholson is a 63 year old male with history of NICM s/p OHT 4/16/2018    DESCRIPTION:  1. Consent obtained with discussion of risks.  All questions were answered.  2. Sterile prep and procedure.  3. Location with Sheaths:   Lf Radial Arterial  6 Fr  10 cm [short]  Lf IJ  7 Fr 10 cm [short]  4. Access: Local anesthetic with lidocaine.  A standard 18 guage needle with ultrasound guidance was used to establish vascular access using a modified Seldinger technique.  Micropuncture used for left radial artery.  5. Diagnostic Catheters:   6 FR JR4 and XB3.5 and 7Fr Diablo Jax  6. Estimated blood loss: < 25 ml    MEDICATIONS:  The procedure was performed under conscious sedation for 42 minutes from 1641 to 1723.  The patient was assessed immediately before the first sedation medication was administered.  Midazolam 2 mg and Fentanyl 100 mcg were administered.  Heart rate, BP, respiration, oxygen saturation and patient responses were monitored throughout the procedure with the assistance of the RN under my supervision.  >> IA Nicardipine and IA NTG were administered to prophylax against radial artery spasm.    Procedures:  ENDOMYOCARDIAL BIOPSY:  1. Successful collection of 5 right ventricular endomyocardial biopsy samples using the Jawz.    HEMODYNAMICS:  BSA 1.96  1. HR 91 bpm  2. /78/104 mmHg  3. RA 8/5/6   4. RV 25/6  5. PA 25/13/18   6. PCW 10/11/9   7. PA sat 75%   8. PCW sat not obtained  9. Hgb 6.5 g/dL   10. Kassi CO 11.0   11. Kassi CI 5.7   12. TD CO 9.1   13. TD CI 4.7   14. PVR 0.81  15.      CORONARY ANGIOGRAM:   1. Both coronary arteries arise from their respective cusps.  2. Dominance:  Right  3. The LM short and has 10% stenosis with mild diffuse irregularities throughout.  4. LAD: Type 3 [LAD supplies the entire apex]. The LAD is a large vessel that gives rise to large D1 and medium sized D2 and D3 vessels.  There is proximal 20% stenosis, there is 30% stenosis in the mid-LAD just after the D1 takeoff, the distal LAD has 10% stenosis.  The D1 has 30% ostial stenosis.  5. The LCx is a large vessel.  It gives rise to large OM1, OM2, and OM3 vessels.  The proximal LCx has 10% stenosis, the mid and distal LCx has minimal luminal irregularities.  The OM1 has 20% proximal stenosis.  The OM2 has a 70% stenosis just prior to the bifurcation.  The inferior branch of the bifurcation has an 60% lesion as well.  6.  The RCA gives rise to small RPLA and large RPDA.  The proximal RCA appears angiographically without disease.  The mid and distal RCA have multifocal 10-20% disease.  The RPDA has mild luminal irregularities      INTRA-VASCULAR ULTRASOUND:  Adequate anticoagulation was was obtained with UFH.  A BMW wire was positioned in the mid LAD.  A Volcano [place] Eye catheter was advanced across the wire into the vessel.      Proximal LAD  Area 13.1 mm2  Minimal 3.7 mm  Maximal 4.4 mm    Actual  Area 4.3 mm2  Minimal 2.2 mm  Maximal 2.6 mm    Left Main  Area 16.6 mm2  Minimal 4.5 mm  Maximal 4.8 mm    Actual  Area 10.0 mm2  Minimal 3.4 mm  Maximal 3.9 mm    Sheath Removal:  The LIJ and LRA sheath was manually removed in the cardiac catheterization laboratory.    Contrast: Isovue, 73 ml     Fluoroscopy Time: 6.9 min    COMPLICATIONS:  1. None    SUMMARY:   >> Normal left and right sided filling pressures, normal pulmonary pressures and hyperdynamic cardiac output/index.  >> Successful endomyocardial biopsies  >> Coronary artery disease involving the distal RCA, OM2 and LAD    PLAN:   >> ASA 81 mg qd  >> Continued medical management and lifestyle modification for cardiovascular risk factor optimization.   >>  Discharge today per protocol, follow up with Dr. Ernst    The attending interventional cardiologist was present and supervised all critical aspects the procedure.    Findings discussed with patient and Dr. Ernst at conclusion of the case.    See CVIS report for final draft.    Jordan Urrutia MD & Lui Aguilar MD  Cardiology Fellow    Jordan Fox MD  Cardiology Staff    ATTENDING ATTESTATION: I was present and supervised the cardiology fellow throughout the procedure and reviewed the findings with the fellow at the completion of the procedure.  I agree with the documentation above.    Jordan Fox MD, PhD  Interventional/Critical Care Cardiology  [phone]    July 18, 2018

## 2018-07-18 NOTE — PROGRESS NOTES
Pt arrived to 2A for R heart/biopsy, coronary angiogram. VSS, pt denies pain. IV inserted, labs previously collected. Pedal pulses +2. EKG done. Awaiting consent. Continue to monitor

## 2018-07-18 NOTE — PROGRESS NOTES
[facility] Dermatology Note      Dermatology Problem List:  1. Heart Transplant 6/14/2018   -mycophenolate, prednisone, tacrolimus  2. Hyperkeratosis/Xerosis of the distal fingers - TAC 0.1% ointment  3. Onychodystrophy - likely onychomycosis, awaiting culture      Encounter Date: Jul 18, 2018    CC:  Chief Complaint   Patient presents with     Skin Check     Murray is here today for a skin check- has some reas of concern on his fingers, they're peeling.          History of Present Illness:  Mr. Murray Nicholson is a 63 year old male who presents for evaluation of peeling on the fingers, brittle nails and thickened discolored toe nails. The patient had a heart transplant patient which occurred 6/14/2018. He is currently immunosuppressed on tacrolimus, mycophenolate and prednisone. The patient reports that the finger peeling occurred about 3 weeks ago. About 1 week after heart surgery, he noticed some tingling and burning in his finger tips and about 1 week after that the flaking/peeling happened. This now seems to be resolving as the burning sensation has subsided. He is wondering if there is anything he should be doing regarding this. This occurred most frequently when they were exposed to heat. He has lots of dryness of the skin especially at night. The patient is also having dry nails. He says the nail brittleness began about 1-2 years ago, prior to many of his chronic conditions and prior to his transplant.      He also has thickening and discoloration fo the big toenails and he would like treatment for this.         Past Medical History:   Patient Active Problem List   Diagnosis     Esophageal reflux     Hypersomnia with sleep apnea     Allergic rhinitis     CHF (congestive heart failure) (H)     CKD (chronic kidney disease) stage 5, GFR less than 15 ml/min (H)     Hyperlipidemia LDL goal <100     Hypertension goal BP (blood pressure) < 130/80     Hypertensive cardiopathy     Tubular adenoma      Anemia of chronic disease     Bicuspid aortic valve     CAD (coronary artery disease)     Anemia in chronic renal disease     Hypertension     Dyslipidemia     MGUS (monoclonal gammopathy of unknown significance)     Ascending aortic aneurysm (H)     Type 2 diabetes mellitus with diabetic chronic kidney disease (H)     Anemia, iron deficiency     Anemia in stage 5 chronic kidney disease (H)     Fluid overload     Chronic systolic heart failure (H)     Volume overload     Peritonitis (H)     Heart failure (H)     Encounter for therapeutic drug monitoring     Heart transplanted (H)     Leukopenia     Past Medical History:   Diagnosis Date     (HFpEF) heart failure with preserved ejection fraction (H)      Allergic rhinitis, cause unspecified      Anemia of chronic kidney failure      AS (aortic stenosis)      Ascending aortic aneurysm (H)      Bicuspid aortic valve      CAD (coronary artery disease)      Chronic kidney disease, stage 5 (H)      Congestive heart failure, unspecified      Dyslipidemia      Esophageal reflux      ESRD (end stage renal disease) (H)      Hypersomnia with sleep apnea, unspecified      Hypertension      MGUS (monoclonal gammopathy of unknown significance)      Mitral regurgitation      SHEELA (obstructive sleep apnea)      Systolic heart failure (H)      Type 2 diabetes mellitus (H)      Past Surgical History:   Procedure Laterality Date     COLONOSCOPY N/A 5/3/2018    Procedure: COLONOSCOPY;  colonoscopy ;  Surgeon: Ammon Castillo MD;  [facility]     ESOPHAGOSCOPY, GASTROSCOPY, DUODENOSCOPY (EGD), COMBINED N/A 5/7/2018    Procedure: COMBINED ENDOSCOPIC ULTRASOUND, ESOPHAGOSCOPY, GASTROSCOPY, DUODENOSCOPY (EGD), FINE NEEDLE ASPIRATE/BIOPSY;  Endoscopic Ultrasound with Fine Needle Aspiration ;  Surgeon: Alon Don MD;  [facility]     LAPAROSCOPIC HERNIORRHAPHY INGUINAL BILATERAL Bilateral 7/24/2015    Procedure: LAPAROSCOPIC HERNIORRHAPHY INGUINAL BILATERAL;  Surgeon:  Bobby Mcconnell MD;  [facility]     LAPAROSCOPIC INSERTION CATHETER PERITONEAL DIALYSIS N/A 6/22/2017    Procedure: LAPAROSCOPIC INSERTION CATHETER PERITONEAL DIALYSIS;  Laparoscopic Peritoneal Dialysis Catheter Placement - Anesthesia with block;  Surgeon: Esteban Arvizu MD;  [facility]     PICC INSERTION Left 04/22/2018    5Fr - 49cm (3cm external), Basilic vein, low SVC     REMOVE CATHETER PERITONEAL Right 1/15/2018    Procedure: REMOVE CATHETER PERITONEAL;  Open Removal of Peritoneal Dialysis Catheter ;  Surgeon: Esteban Arvizu MD;  [facility]     TRANSPLANT HEART RECIPIENT N/A 6/14/2018    Procedure: TRANSPLANT HEART RECIPIENT;  Median Sternotomy, on-pump oxygenator, Heart Transplant;  Surgeon: Rony Caputo MD;  [facility]       Social History:  The patient is .     Family History:  Not addressed at this visit.     Medications:  Current Outpatient Prescriptions   Medication Sig Dispense Refill     acetaminophen (TYLENOL) 325 MG tablet Take 2 tablets (650 mg) by mouth every 4 hours as needed for mild pain (multimodal surgical pain management along with NSAIDS and opioid medication as indicated based on pain control and physical function.) 100 tablet 0     albuterol (PROAIR HFA/PROVENTIL HFA/VENTOLIN HFA) 108 (90 Base) MCG/ACT Inhaler Inhale 6 puffs into the lungs every 4 hours as needed for shortness of breath / dyspnea 1 Inhaler 0     apixaban ANTICOAGULANT (ELIQUIS) 2.5 MG tablet Take 1 tablet (2.5 mg) by mouth 2 times daily 120 tablet 0     ASPIRIN 81 MG OR TABS Take 1 tablet (81 mg) by mouth at bedtime       bismuth subsalicylate (PEPTO BISMOL) 262 MG/15ML suspension Take 15 mLs by mouth every 6 hours as needed for indigestion       blood glucose monitoring (ACCU-CHEK FASTCLIX) lancets Use to test blood sugar 2-3 times daily or as directed.  Ok to substitute alternative if insurance prefers. 102 each 11     blood glucose monitoring (NO BRAND SPECIFIED) test strip  Use to test blood sugar 2-3 times daily or as directed. 100 each 11     fluticasone (FLONASE) 50 MCG/ACT spray Spray 1-2 sprays into both nostrils daily 16 g 11     hydrALAZINE (APRESOLINE) 100 MG TABS tablet Take 1 tablet (100 mg) by mouth 3 times daily 90 tablet 11     insulin isophane human (HUMULIN N PEN) 100 UNIT/ML injection Inject 10 Units Subcutaneous daily (with dinner) 3 mL 0     insulin isophane human (HUMULIN N PEN) 100 UNIT/ML injection Inject 26 Units Subcutaneous every morning (before breakfast) 3 mL 0     isosorbide dinitrate (ISORDIL) 10 MG tablet Take 1 tablet (10 mg) by mouth 3 times daily 120 tablet 0     loratadine (CLARITIN) 10 MG tablet Take 10 mg by mouth daily as needed Reported on 5/3/2017       melatonin 1 MG TABS tablet Take 1 tablet (1 mg) by mouth nightly as needed for sleep 120 tablet 0     mycophenolate (GENERIC EQUIVALENT) 250 MG capsule Take 5 capsules (1,250 mg) by mouth 2 times daily 300 capsule 11     NEPHROCAPS 1 MG capsule Take 1 capsule by mouth daily 120 capsule 0     nystatin (MYCOSTATIN) 839435 UNIT/ML suspension Swish and swallow 10 mLs (1,000,000 Units) in mouth 4 times daily 400 mL 2     order for DME Equipment being ordered: Carol ()  Treatment Diagnosis: ESRD on PD  Pt has to be connected to PD all night and can not be disconnected, hence impending his mobility to go to the bathroom. At risk for infection if he does not have this equipment. 1 Units 0     pantoprazole (PROTONIX) 40 MG EC tablet Take 1 tablet (40 mg) by mouth every morning 30 tablet 0     pravastatin (PRAVACHOL) 40 MG tablet Take 1 tablet (40 mg) by mouth daily 90 tablet 0     predniSONE (DELTASONE) 20 MG tablet Take 1 tablet (20 mg) by mouth every morning 30 tablet 0     predniSONE (DELTASONE) 5 MG tablet Take 3 tablets (15 mg) by mouth 2 times daily 180 tablet 0     senna-docusate (SENOKOT-S;PERICOLACE) 8.6-50 MG per tablet Take 1-2 tablets by mouth 2 times daily as needed for constipation 100  tablet 0     simethicone (MYLICON) 80 MG chewable tablet Take 1 tablet (80 mg) by mouth every 6 hours as needed for cramping 180 tablet 0     tacrolimus (GENERIC EQUIVALENT) 0.5 MG capsule Take one capsule with three 1 mg capsules (3.5 mg) every AM 30 capsule 11     tacrolimus (GENERIC EQUIVALENT) 1 MG capsule Take three capsules with one 0.5 mg capsule (3.5 mg) every AM and three capsules (3 mg) every  capsule 11     terbutaline (BRETHINE) 5 MG tablet Take half tablet 2.5 mg three times daily. 120 tablet 0     traMADol (ULTRAM) 50 MG tablet Take 1 tablet (50 mg) by mouth every 6 hours as needed for moderate pain 10 tablet 0     valGANciclovir (VALCYTE) 450 MG tablet Take 1 tablet (450 mg) by mouth twice a week 120 tablet 0       Allergies   Allergen Reactions     Norco [Hydrocodone-Acetaminophen] Nausea and Vomiting     Cats      Throat tightness     Isosorbide Other (See Comments)     hypotension     Penicillins Hives     Seasonal Allergies      rhinitis     Shrimp      Throat closes        Review of Systems:  -Constitutional: The patient is feeling generally well.   -Skin: As above in HPI. No additional skin concerns.  -GI: no nausea, abdominal pain, vomiting, diarrhea or blood in the stool    Physical exam:  Vitals: There were no vitals taken for this visit.  GEN: This is a well developed, well-nourished male in no acute distress, in a pleasant mood.    SKIN: Focused examination of the bilateral palms, face, bilateral feet and toe nails was performed.  -Thickening and discoloration of the bilateral great toenails. Other toenails do not appear to be involved  -Mild xerosis of the distal finger tips, resolving  -Palms were clear, bilaterally   -Nails on the hands were clear, although appeared to have some scaling/flaking  -No other lesions of concern on areas examined.       Impression/Plan:  1. Hx of immunosuppression 2/2 Chronic Kidney Disease/CHF     Recommend sunscreens SPF #30 or greater, protective  clothing and avoidance of tanning beds.    2. Hx of heart transplant 6/14/2018     Continue regular skin check     Patient to continue with sun protection and hats    Will plan FBSE regularly starting sometime this year and annually going forward    3. Hyperkeratosis/Xerosis of the distal fingers. Consider contact/irritant dermatitis, although I think this also may be 2/2 to the trauma he endured following his transplant surgery - it appears to be resolving    Start biotin supplement for brittle nails    Start triamcinolone 0.1% ointment to distal finger tips, although this all seems to be resoling for him    4. Nail discoloration and thickening on the great toenails - ddx: onychomycosis    Consider terbinafine, topicals, laser - will likely try to avoid systemic agents due to patient's recent transplant hx and other comorbidities    Nail clipping taken today for fungal culture    edu that topical treatments may not work quickly or very well    Will consider follow-up with Dr. Orellana for laser tx possibly as patient seemed interested in this    Follow-up in 1 year, earlier for new or changing lesions.       Staff Involved:  Scribe/Staff    Scribe Disclosure:   I, Shoshana Mcgraw, am serving as a scribe to document services personally performed by MAYTE Reid, based on data collection and the provider's statements to me.      Provider Disclosure:   The documentation recorded by the scribe accurately reflects the services I personally performed and the decisions made by me.    All risks, benefits and alternatives were discussed with patient.  Patient is in agreement and understands the assessment and plan.  All questions were answered.    Jennifer Reid PA-C  [facility]: Phone: [phone], Fax: [phone]

## 2018-07-18 NOTE — LETTER
7/18/2018       RE: Murray Nicholson  [address]  [place] [address]     Dear Colleague,    Thank you for referring your patient, Murray Nicholson, to the [facility] DERMATOLOGY at [facility]. Please see a copy of my visit note below.    [facility] Dermatology Note      Dermatology Problem List:  1. Heart Transplant 6/14/2018   -mycophenolate, prednisone, tacrolimus  2. Hyperkeratosis/Xerosis of the distal fingers - TAC 0.1% ointment  3. Onychodystrophy - likely onychomycosis, awaiting culture      Encounter Date: Jul 18, 2018    CC:  Chief Complaint   Patient presents with     Skin Check     Murray is here today for a skin check- has some reas of concern on his fingers, they're peeling.          History of Present Illness:  Mr. Murray Nicholson is a 63 year old male who presents for evaluation of peeling on the fingers, brittle nails and thickened discolored toe nails. The patient had a heart transplant patient which occurred 6/14/2018. He is currently immunosuppressed on tacrolimus, mycophenolate and prednisone. The patient reports that the finger peeling occurred about 3 weeks ago. About 1 week after heart surgery, he noticed some tingling and burning in his finger tips and about 1 week after that the flaking/peeling happened. This now seems to be resolving as the burning sensation has subsided. He is wondering if there is anything he should be doing regarding this. This occurred most frequently when they were exposed to heat. He has lots of dryness of the skin especially at night. The patient is also having dry nails. He says the nail brittleness began about 1-2 years ago, prior to many of his chronic conditions and prior to his transplant.      He also has thickening and discoloration fo the big toenails and he would like treatment for this.         Past Medical History:   Patient Active Problem List   Diagnosis     Esophageal reflux     Hypersomnia  with sleep apnea     Allergic rhinitis     CHF (congestive heart failure) (H)     CKD (chronic kidney disease) stage 5, GFR less than 15 ml/min (H)     Hyperlipidemia LDL goal <100     Hypertension goal BP (blood pressure) < 130/80     Hypertensive cardiopathy     Tubular adenoma     Anemia of chronic disease     Bicuspid aortic valve     CAD (coronary artery disease)     Anemia in chronic renal disease     Hypertension     Dyslipidemia     MGUS (monoclonal gammopathy of unknown significance)     Ascending aortic aneurysm (H)     Type 2 diabetes mellitus with diabetic chronic kidney disease (H)     Anemia, iron deficiency     Anemia in stage 5 chronic kidney disease (H)     Fluid overload     Chronic systolic heart failure (H)     Volume overload     Peritonitis (H)     Heart failure (H)     Encounter for therapeutic drug monitoring     Heart transplanted (H)     Leukopenia     Past Medical History:   Diagnosis Date     (HFpEF) heart failure with preserved ejection fraction (H)      Allergic rhinitis, cause unspecified      Anemia of chronic kidney failure      AS (aortic stenosis)      Ascending aortic aneurysm (H)      Bicuspid aortic valve      CAD (coronary artery disease)      Chronic kidney disease, stage 5 (H)      Congestive heart failure, unspecified      Dyslipidemia      Esophageal reflux      ESRD (end stage renal disease) (H)      Hypersomnia with sleep apnea, unspecified      Hypertension      MGUS (monoclonal gammopathy of unknown significance)      Mitral regurgitation      SHEELA (obstructive sleep apnea)      Systolic heart failure (H)      Type 2 diabetes mellitus (H)      Past Surgical History:   Procedure Laterality Date     COLONOSCOPY N/A 5/3/2018    Procedure: COLONOSCOPY;  colonoscopy ;  Surgeon: Ammon Castillo MD;  Location:  GI     ESOPHAGOSCOPY, GASTROSCOPY, DUODENOSCOPY (EGD), COMBINED N/A 5/7/2018    Procedure: COMBINED ENDOSCOPIC ULTRASOUND, ESOPHAGOSCOPY, GASTROSCOPY, DUODENOSCOPY  (EGD), FINE NEEDLE ASPIRATE/BIOPSY;  Endoscopic Ultrasound with Fine Needle Aspiration ;  Surgeon: Alon Don MD;  [facility]     LAPAROSCOPIC HERNIORRHAPHY INGUINAL BILATERAL Bilateral 7/24/2015    Procedure: LAPAROSCOPIC HERNIORRHAPHY INGUINAL BILATERAL;  Surgeon: Bobby Mcconnell MD;  [facility]     LAPAROSCOPIC INSERTION CATHETER PERITONEAL DIALYSIS N/A 6/22/2017    Procedure: LAPAROSCOPIC INSERTION CATHETER PERITONEAL DIALYSIS;  Laparoscopic Peritoneal Dialysis Catheter Placement - Anesthesia with block;  Surgeon: Esteban Arvizu MD;  [facility]     PICC INSERTION Left 04/22/2018    5Fr - 49cm (3cm external), Basilic vein, low SVC     REMOVE CATHETER PERITONEAL Right 1/15/2018    Procedure: REMOVE CATHETER PERITONEAL;  Open Removal of Peritoneal Dialysis Catheter ;  Surgeon: Esteban Arvizu MD;  [facility]     TRANSPLANT HEART RECIPIENT N/A 6/14/2018    Procedure: TRANSPLANT HEART RECIPIENT;  Median Sternotomy, on-pump oxygenator, Heart Transplant;  Surgeon: Rony Caputo MD;  [facility]       Social History:  The patient is .     Family History:  Not addressed at this visit.     Medications:  Current Outpatient Prescriptions   Medication Sig Dispense Refill     acetaminophen (TYLENOL) 325 MG tablet Take 2 tablets (650 mg) by mouth every 4 hours as needed for mild pain (multimodal surgical pain management along with NSAIDS and opioid medication as indicated based on pain control and physical function.) 100 tablet 0     albuterol (PROAIR HFA/PROVENTIL HFA/VENTOLIN HFA) 108 (90 Base) MCG/ACT Inhaler Inhale 6 puffs into the lungs every 4 hours as needed for shortness of breath / dyspnea 1 Inhaler 0     apixaban ANTICOAGULANT (ELIQUIS) 2.5 MG tablet Take 1 tablet (2.5 mg) by mouth 2 times daily 120 tablet 0     ASPIRIN 81 MG OR TABS Take 1 tablet (81 mg) by mouth at bedtime       bismuth subsalicylate (PEPTO BISMOL) 262 MG/15ML suspension Take 15 mLs by  mouth every 6 hours as needed for indigestion       blood glucose monitoring (ACCU-CHEK FASTCLIX) lancets Use to test blood sugar 2-3 times daily or as directed.  Ok to substitute alternative if insurance prefers. 102 each 11     blood glucose monitoring (NO BRAND SPECIFIED) test strip Use to test blood sugar 2-3 times daily or as directed. 100 each 11     fluticasone (FLONASE) 50 MCG/ACT spray Spray 1-2 sprays into both nostrils daily 16 g 11     hydrALAZINE (APRESOLINE) 100 MG TABS tablet Take 1 tablet (100 mg) by mouth 3 times daily 90 tablet 11     insulin isophane human (HUMULIN N PEN) 100 UNIT/ML injection Inject 10 Units Subcutaneous daily (with dinner) 3 mL 0     insulin isophane human (HUMULIN N PEN) 100 UNIT/ML injection Inject 26 Units Subcutaneous every morning (before breakfast) 3 mL 0     isosorbide dinitrate (ISORDIL) 10 MG tablet Take 1 tablet (10 mg) by mouth 3 times daily 120 tablet 0     loratadine (CLARITIN) 10 MG tablet Take 10 mg by mouth daily as needed Reported on 5/3/2017       melatonin 1 MG TABS tablet Take 1 tablet (1 mg) by mouth nightly as needed for sleep 120 tablet 0     mycophenolate (GENERIC EQUIVALENT) 250 MG capsule Take 5 capsules (1,250 mg) by mouth 2 times daily 300 capsule 11     NEPHROCAPS 1 MG capsule Take 1 capsule by mouth daily 120 capsule 0     nystatin (MYCOSTATIN) 016759 UNIT/ML suspension Swish and swallow 10 mLs (1,000,000 Units) in mouth 4 times daily 400 mL 2     order for DME Equipment being ordered: Carol ()  Treatment Diagnosis: ESRD on PD  Pt has to be connected to PD all night and can not be disconnected, hence impending his mobility to go to the bathroom. At risk for infection if he does not have this equipment. 1 Units 0     pantoprazole (PROTONIX) 40 MG EC tablet Take 1 tablet (40 mg) by mouth every morning 30 tablet 0     pravastatin (PRAVACHOL) 40 MG tablet Take 1 tablet (40 mg) by mouth daily 90 tablet 0     predniSONE (DELTASONE) 20 MG tablet  Take 1 tablet (20 mg) by mouth every morning 30 tablet 0     predniSONE (DELTASONE) 5 MG tablet Take 3 tablets (15 mg) by mouth 2 times daily 180 tablet 0     senna-docusate (SENOKOT-S;PERICOLACE) 8.6-50 MG per tablet Take 1-2 tablets by mouth 2 times daily as needed for constipation 100 tablet 0     simethicone (MYLICON) 80 MG chewable tablet Take 1 tablet (80 mg) by mouth every 6 hours as needed for cramping 180 tablet 0     tacrolimus (GENERIC EQUIVALENT) 0.5 MG capsule Take one capsule with three 1 mg capsules (3.5 mg) every AM 30 capsule 11     tacrolimus (GENERIC EQUIVALENT) 1 MG capsule Take three capsules with one 0.5 mg capsule (3.5 mg) every AM and three capsules (3 mg) every  capsule 11     terbutaline (BRETHINE) 5 MG tablet Take half tablet 2.5 mg three times daily. 120 tablet 0     traMADol (ULTRAM) 50 MG tablet Take 1 tablet (50 mg) by mouth every 6 hours as needed for moderate pain 10 tablet 0     valGANciclovir (VALCYTE) 450 MG tablet Take 1 tablet (450 mg) by mouth twice a week 120 tablet 0       Allergies   Allergen Reactions     Norco [Hydrocodone-Acetaminophen] Nausea and Vomiting     Cats      Throat tightness     Isosorbide Other (See Comments)     hypotension     Penicillins Hives     Seasonal Allergies      rhinitis     Shrimp      Throat closes        Review of Systems:  -Constitutional: The patient is feeling generally well.   -Skin: As above in HPI. No additional skin concerns.  -GI: no nausea, abdominal pain, vomiting, diarrhea or blood in the stool    Physical exam:  Vitals: There were no vitals taken for this visit.  GEN: This is a well developed, well-nourished male in no acute distress, in a pleasant mood.    SKIN: Focused examination of the bilateral palms, face, bilateral feet and toe nails was performed.  -Thickening and discoloration of the bilateral great toenails. Other toenails do not appear to be involved  -Mild xerosis of the distal finger tips, resolving  -Palms were  clear, bilaterally   -Nails on the hands were clear, although appeared to have some scaling/flaking  -No other lesions of concern on areas examined.       Impression/Plan:  1. Hx of immunosuppression 2/2 Chronic Kidney Disease/CHF     Recommend sunscreens SPF #30 or greater, protective clothing and avoidance of tanning beds.    2. Hx of heart transplant 6/14/2018     Continue regular skin check     Patient to continue with sun protection and hats    Will plan FBSE regularly starting sometime this year and annually going forward    3. Hyperkeratosis/Xerosis of the distal fingers. Consider contact/irritant dermatitis, although I think this also may be 2/2 to the trauma he endured following his transplant surgery - it appears to be resolving    Start biotin supplement for brittle nails    Start triamcinolone 0.1% ointment to distal finger tips, although this all seems to be resoling for him    4. Nail discoloration and thickening on the great toenails - ddx: onychomycosis    Consider terbinafine, topicals, laser - will likely try to avoid systemic agents due to patient's recent transplant hx and other comorbidities    Nail clipping taken today for fungal culture    edu that topical treatments may not work quickly or very well    Will consider follow-up with Dr. Orellana for laser tx possibly as patient seemed interested in this    Follow-up in 1 year, earlier for new or changing lesions.       Staff Involved:  Scribe/Staff    Scribe Disclosure:   I, Shoshana Mcgraw, am serving as a scribe to document services personally performed by MAYTE Reid, based on data collection and the provider's statements to me.      Provider Disclosure:   The documentation recorded by the scribe accurately reflects the services I personally performed and the decisions made by me.    All risks, benefits and alternatives were discussed with patient.  Patient is in agreement and understands the assessment and plan.  All questions were  answered.    Jennifer Reid PA-C  [facility]: Phone: [phone], Fax: [phone]

## 2018-07-18 NOTE — IP AVS SNAPSHOT
MRN:[id number]                      After Visit Summary   7/18/2018    Murray Nicholson    MRN: [id number]           Thank you!     Thank you for choosing [facility] for your care. Our goal is always to provide you with excellent care. Hearing back from our patients is one way we can continue to improve our services. Please take a few minutes to complete the written survey that you may receive in the mail after you visit with us. Thank you!        Patient Information     Date Of Birth          1955        About your hospital stay     You were admitted on:  July 18, 2018 You last received care in the:  Unit 6D Observation [facility]    You were discharged on:  July 18, 2018       Who to Call     For medical emergencies, please call 911.  For non-urgent questions about your medical care, please call your primary care provider or clinic, [phone]          Attending Provider     Provider Specialty    Klever Ernst MD Cardiology    Jordna Fox MD Cardiology       Primary Care Provider Office Phone # Fax #    Yahir Turcios [phone]      After Care Instructions     Discharge Instructions - IF on Metformin (Glucophage or Glucovance) or Metformin containing medications       IF on Metformin (Glucophage or Glucovance) or Metformin containing medications , schedule a Basic Metabolic Panel at [facility] or Primary Clinic in 48 - 72 hours post procedure and PRIOR TO resuming the Metformin or Metformin containing medications.  Hold Metformin (Glucophage or Glucovance) or Metformin containing medications until after the Basic Metabolic Panel on the 2nd or 3rd day following the procedure.  May resume after blood draw is complete.                  Your next 10 appointments already scheduled     Jul 20, 2018  8:00 AM CDT   Infusion 60 with UC SPEC INFUSION, UC 47 ATC   [facility] Specialty and Procedure ([facility])    [address]  [address]  [facility] [place] [id number] [phone]            Jul 20, 2018  9:15 AM CDT   LAB with  LAB    Health Lab ([facility])    [address]  [address] [facility] [id number] [phone]           Please do not eat 10-12 hours before your appointment if you are coming in fasting for labs on lipids, cholesterol, or glucose (sugar). This does not apply to pregnant women. Water, hot tea and black coffee (with nothing added) are okay. Do not drink other fluids, diet soda or chew gum.            Jul 20, 2018 10:00 AM CDT   Ech Complete with [id number]    Health Echo ([facility])    [address] [place] [address]  3rd Floor  [facility] [place] [id number] [phone]           1.  Please bring or wear a comfortable two-piece outfit. 2.  You may eat, drink and take your normal medicines. 3.  For any questions that cannot be answered, please contact the ordering physician 4.  Please do not wear perfumes or scented lotions on the day of your exam.            Jul 20, 2018 11:00 AM CDT   XR CHEST 2 VIEWS with UCXR1   [facility] Xray ([facility])    [address] [place] [address]  [address] [facility] [id number] [phone]           Please bring a list of your current medicines to your exam. (Include vitamins, minerals and over-thecounter medicines.) Leave your valuables at home.  Tell your doctor if there is a chance you may be pregnant.  You do not need to do anything special for this exam.            Jul 20, 2018 12:00 PM CDT   (Arrive by 11:45 AM)   RETURN HEART TRANSPLANT with VERONICA Rocha CNP   [facility] ([facility])    [address] [place] [address]  [address]  [facility] [place] [id number] [phone]            Jul 25, 2018  9:00 AM CDT   TELEMEDICINE with Eduardo Lea RPH   [facility] Therapy Management ([facility])    [address]  3rd Floor  [facility] [address]   [phone]           Note: this is not an onsite visit; there is no need to come to the facility.            Jul 27, 2018  8:30 AM CDT   Lab with  LAB   [facility] Lab ([facility])    [address]  1st Floor  [facility] [address]   [phone]            Jul 27, 2018  9:00 AM CDT   (Arrive by 8:45 AM)   RETURN HEART TRANSPLANT with Klever Ernst MD   [facility] Care ([facility])    [address]  [address]  [facility] [address]   [phone]            Jul 27, 2018 11:00 AM CDT   Procedure - 2.5 hour with U2A ROOM 17   Unit 2A [facility] ([facility])    [address] [address]                 Further instructions from your care team             Patient Name: Murray Nicholson  Date of Procedure: July 18, 2018      After you go home:    Have an adult stay with you for 24 hours.    Drink plenty of fluids.    You may eat your normal diet, unless your doctor tells you otherwise.    For 24 hours:    Relax and take it easy.    Do NOT smoke.    Do NOT make any important or legal decisions.    Do NOT drive or operate machines at home or at work.    Do NOT drink alcohol.    Remove the Band-Aid after 24 hours. If there is minor oozing, apply another Band-aid and remove it after 12 hours.    For 2 days, do NOT have sex or do any heavy exercise.    Do NOT take a bath, or use a hot tub or pool for at least 3 days. You may shower.      Care of wrist or arm site  It is normal to have soreness at the puncture site and mild tingling in your hand for up to 3 days.    For 2 days, do not use your hand or arm to support your weight (such as rising from a chair) or bend your wrist (such as lifting a garage door).    For 2 days, do not lift more than 5 pounds or exercise your arm (tennis, golf or bowling).    If you start bleeding from  the site in your arm:    Sit down and press firmly on the site with your fingers for 10 minutes. Call your doctor as soon as you can.    If the bleeding stops, sit still and keep your wrist straight for 2 hours.    Medicines    If you have started taking Plavix or Effient, do not stop taking it until you talk to your heart doctor (cardiologist).    If you are on metformin (Glucophage), do not restart it until you have blood tests (within 2 to 3 days after discharge). When your doctor tells you it is safe, you may restart the metformin.    If you have stopped any other medicines, check with your nurse or provider about when to restart them.    Call 911 right away if you have bleeding that is heavy or does not stop.    Call your doctor if:    You have a large or growing hard lump around the site.    The site is red, swollen, hot or tender.    Blood or fluid is draining from the site.    You have chills or a fever greater than 101 F (38 C).    Your leg or arm feels numb or cool.    You have hives, a rash or unusual itching.      [facility] [place]:  [phone] (7 days a week)            Pending Results     Date and Time Order Name Status Description    7/18/2018 1806 Surgical pathology exam In process     7/18/2018 1158 EKG 12-lead, tracing only Preliminary     7/18/2018 1110 FUNGUS SKIN HAIR NAIL CULTURE Preliminary     7/18/2018 0818 PRA SINGLE ANTIGEN IGG ANTIBODY In process     7/18/2018 0818 PRA DONOR SPECIFIC ANTIBODY In process     7/18/2018 0818 EBV DNA PCR QUANTITATIVE WHOLE BLOOD In process     7/18/2018 0818 CMV DNA QUANTIFICATION In process             Admission Information     Date & Time Provider Department Dept. Phone    7/18/2018 Jordan Fox MD Unit 6D Observation [facility] [phone]      Your Vitals Were     Blood Pressure Pulse Temperature Respirations Pulse Oximetry       125/66 99 98.1  F (36.7  C) (Oral) 18 100%       MyChart Information     MyChart  gives you secure access to your electronic health record. If you see a primary care provider, you can also send messages to your care team and make appointments. If you have questions, please call your primary care clinic.  If you do not have a primary care provider, please call [phone] and they will assist you.        Care EveryWhere ID     This is your Care EveryWhere ID. This could be used by other organizations to access your [facility] medical records  [id number]        Equal Access to Services     JOHN HAUSER : Hadmax bonnero Sotracey, waaxda luqadaha, qaybta kaalmada adedorothyyada, jose ayala. So [facility] [phone].    ATENCIÓN: Si radhala shane, tiene a ortiz disposición servicios gratuitos de asistencia lingüística. [facility] [phone].    We comply with applicable federal civil rights laws and [place] laws. We do not discriminate on the basis of race, color, national origin, age, disability, sex, sexual orientation, or gender identity.               Review of your medicines      CONTINUE these medicines which may have CHANGED, or have new prescriptions. If we are uncertain of the size of tablets/capsules you have at home, strength may be listed as something that might have changed.        Dose / Directions    mycophenolate 250 MG capsule   Commonly known as:  GENERIC EQUIVALENT   This may have changed:  how much to take   Used for:  Heart transplanted (H)        Dose:  750 mg   Take 3 capsules (750 mg) by mouth 2 times daily   Quantity:  180 capsule   Refills:  11         CONTINUE these medicines which have NOT CHANGED        Dose / Directions    acetaminophen 325 MG tablet   Commonly known as:  TYLENOL   Used for:  Acute post-operative pain        Dose:  650 mg   Take 2 tablets (650 mg) by mouth every 4 hours as needed for mild pain (multimodal surgical pain management along with NSAIDS and opioid medication as indicated based on pain control and physical function.)    Quantity:  100 tablet   Refills:  0       albuterol 108 (90 Base) MCG/ACT Inhaler   Commonly known as:  PROAIR HFA/PROVENTIL HFA/VENTOLIN HFA   Used for:  Dyspnea on exertion        Dose:  6 puff   Inhale 6 puffs into the lungs every 4 hours as needed for shortness of breath / dyspnea   Quantity:  1 Inhaler   Refills:  0       apixaban ANTICOAGULANT 2.5 MG tablet   Commonly known as:  ELIQUIS   Used for:  Heart transplanted (H)        Dose:  2.5 mg   Take 1 tablet (2.5 mg) by mouth 2 times daily   Quantity:  120 tablet   Refills:  0       aspirin 81 MG tablet        Take 1 tablet (81 mg) by mouth at bedtime   Refills:  0       biotin 5 MG Caps   Commonly known as:  BIOTIN 5000   Used for:  Brittle nails        Dose:  5 mg   Take 5 mg by mouth daily   Quantity:  30 capsule   Refills:  3       bismuth subsalicylate 262 MG/15ML suspension   Commonly known as:  PEPTO BISMOL        Dose:  15 mL   Take 15 mLs by mouth every 6 hours as needed for indigestion   Refills:  0       blood glucose monitoring lancets   Used for:  Type 2 diabetes mellitus with stage 5 chronic kidney disease not on chronic dialysis, without long-term current use of insulin (H)        Use to test blood sugar 2-3 times daily or as directed.  Ok to substitute alternative if insurance prefers.   Quantity:  102 each   Refills:  11       blood glucose monitoring test strip   Commonly known as:  no brand specified   Used for:  Type 2 diabetes mellitus with stage 5 chronic kidney disease not on chronic dialysis, without long-term current use of insulin (H)        Use to test blood sugar 2-3 times daily or as directed.   Quantity:  100 each   Refills:  11       fluticasone 50 MCG/ACT spray   Commonly known as:  FLONASE   Used for:  Nasal congestion        Dose:  1-2 spray   Spray 1-2 sprays into both nostrils daily   Quantity:  16 g   Refills:  11       hydrALAZINE 100 MG Tabs tablet   Commonly known as:  APRESOLINE   Used for:  Heart transplant recipient  (H)        Dose:  100 mg   Take 1 tablet (100 mg) by mouth 3 times daily   Quantity:  90 tablet   Refills:  11       * insulin isophane human 100 UNIT/ML injection   Commonly known as:  HumuLIN N PEN   Used for:  Type 2 diabetes mellitus with stage 5 chronic kidney disease not on chronic dialysis, without long-term current use of insulin (H)        Dose:  10 Units   Inject 10 Units Subcutaneous daily (with dinner)   Quantity:  3 mL   Refills:  0       * insulin isophane human 100 UNIT/ML injection   Commonly known as:  HumuLIN N PEN   Used for:  Type 2 diabetes mellitus with stage 5 chronic kidney disease not on chronic dialysis, without long-term current use of insulin (H)        Dose:  26 Units   Inject 26 Units Subcutaneous every morning (before breakfast)   Quantity:  3 mL   Refills:  0       isosorbide dinitrate 10 MG tablet   Commonly known as:  ISORDIL   Used for:  Heart transplanted (H), Hypertension goal BP (blood pressure) < 130/80        Dose:  10 mg   Take 1 tablet (10 mg) by mouth 3 times daily   Quantity:  120 tablet   Refills:  0       loratadine 10 MG tablet   Commonly known as:  CLARITIN   Used for:  Hypertensive cardiopathy, SOB (shortness of breath)        Dose:  10 mg   Take 10 mg by mouth daily as needed Reported on 5/3/2017   Refills:  0       melatonin 1 MG Tabs tablet   Used for:  Heart transplanted (H)        Dose:  1 mg   Take 1 tablet (1 mg) by mouth nightly as needed for sleep   Quantity:  120 tablet   Refills:  0       NEPHROCAPS 1 MG capsule        Dose:  1 capsule   Take 1 capsule by mouth daily   Quantity:  120 capsule   Refills:  0       nystatin 067475 UNIT/ML suspension   Commonly known as:  MYCOSTATIN   Used for:  Heart transplant recipient (H)        Dose:  [id number] Units   Swish and swallow 10 mLs (1,000,000 Units) in mouth 4 times daily   Quantity:  400 mL   Refills:  2       order for DME   Used for:  CKD (chronic kidney disease) stage 5, GFR less than 15 ml/min (H)         Equipment being ordered: [employer] () Treatment Diagnosis: ESRD on PD Pt has to be connected to PD all night and can not be disconnected, hence impending his mobility to go to the bathroom. At risk for infection if he does not have this equipment.   Quantity:  1 Units   Refills:  0       pantoprazole 40 MG EC tablet   Commonly known as:  PROTONIX   Used for:  Heart transplant recipient (H)        Dose:  40 mg   Take 1 tablet (40 mg) by mouth every morning   Quantity:  30 tablet   Refills:  0       pravastatin 40 MG tablet   Commonly known as:  PRAVACHOL   Used for:  Dyslipidemia        Dose:  40 mg   Take 1 tablet (40 mg) by mouth daily   Quantity:  90 tablet   Refills:  0       * predniSONE 20 MG tablet   Commonly known as:  DELTASONE   Used for:  Heart transplant recipient (H)        Dose:  20 mg   Take 1 tablet (20 mg) by mouth every morning   Quantity:  30 tablet   Refills:  0       * predniSONE 5 MG tablet   Commonly known as:  DELTASONE   Used for:  Heart transplant recipient (H)        Dose:  15 mg   Take 3 tablets (15 mg) by mouth 2 times daily   Quantity:  180 tablet   Refills:  0       senna-docusate 8.6-50 MG per tablet   Commonly known as:  SENOKOT-S;PERICOLACE   Used for:  Constipation, unspecified constipation type        Dose:  1-2 tablet   Take 1-2 tablets by mouth 2 times daily as needed for constipation   Quantity:  100 tablet   Refills:  0       simethicone 80 MG chewable tablet   Commonly known as:  MYLICON   Used for:  Constipation, unspecified constipation type        Dose:  80 mg   Take 1 tablet (80 mg) by mouth every 6 hours as needed for cramping   Quantity:  180 tablet   Refills:  0       * tacrolimus 0.5 MG capsule   Commonly known as:  GENERIC EQUIVALENT   Used for:  Heart transplant recipient (H)        Take one capsule with three 1 mg capsules (3.5 mg) every AM   Quantity:  30 capsule   Refills:  11       * tacrolimus 1 MG capsule   Commonly known as:  GENERIC EQUIVALENT   Used for:   Heart transplant recipient (H)        Take three capsules with one 0.5 mg capsule (3.5 mg) every AM and three capsules (3 mg) every PM   Quantity:  180 capsule   Refills:  11       terbutaline 5 MG tablet   Commonly known as:  BRETHINE   Used for:  Heart transplanted (H)        Take half tablet 2.5 mg three times daily.   Quantity:  120 tablet   Refills:  0       traMADol 50 MG tablet   Commonly known as:  ULTRAM   Used for:  Acute post-operative pain        Dose:  50 mg   Take 1 tablet (50 mg) by mouth every 6 hours as needed for moderate pain   Quantity:  10 tablet   Refills:  0       triamcinolone 0.1 % ointment   Commonly known as:  KENALOG   Used for:  Xerosis of skin        Apply topically 2 times daily   Quantity:  80 g   Refills:  0       valGANciclovir 450 MG tablet   Commonly known as:  VALCYTE   Indication:  Medication Treatment to Prevent Cytomegalovirus Disease   Used for:  Heart transplanted (H)        Dose:  450 mg   Take 1 tablet (450 mg) by mouth twice a week   Quantity:  120 tablet   Refills:  0       * Notice:  This list has 6 medication(s) that are the same as other medications prescribed for you. Read the directions carefully, and ask your doctor or other care provider to review them with you.         Where to get your medicines      These medications were sent to [facility] - [place], [place] - [address] [place] [address]  [address] [place] [address]    Hours:  Mon-Fri 8:30am-5:00pm Toll Free [phone] Phone:  [phone]     mycophenolate 250 MG capsule                Protect others around you: Learn how to safely use, store and throw away your medicines at www.disposemymeds.org.             Medication List: This is a list of all your medications and when to take them. Check marks below indicate your daily home schedule. Keep this list as a reference.      Medications           Morning Afternoon Evening Bedtime As Needed    acetaminophen 325 MG tablet    Commonly known as:  TYLENOL   Take 2 tablets (650 mg) by mouth every 4 hours as needed for mild pain (multimodal surgical pain management along with NSAIDS and opioid medication as indicated based on pain control and physical function.)                                albuterol 108 (90 Base) MCG/ACT Inhaler   Commonly known as:  PROAIR HFA/PROVENTIL HFA/VENTOLIN HFA   Inhale 6 puffs into the lungs every 4 hours as needed for shortness of breath / dyspnea                                apixaban ANTICOAGULANT 2.5 MG tablet   Commonly known as:  ELIQUIS   Take 1 tablet (2.5 mg) by mouth 2 times daily                                aspirin 81 MG tablet   Take 1 tablet (81 mg) by mouth at bedtime                                biotin 5 MG Caps   Commonly known as:  BIOTIN 5000   Take 5 mg by mouth daily                                bismuth subsalicylate 262 MG/15ML suspension   Commonly known as:  PEPTO BISMOL   Take 15 mLs by mouth every 6 hours as needed for indigestion                                blood glucose monitoring lancets   Use to test blood sugar 2-3 times daily or as directed.  Ok to substitute alternative if insurance prefers.                                blood glucose monitoring test strip   Commonly known as:  no brand specified   Use to test blood sugar 2-3 times daily or as directed.                                fluticasone 50 MCG/ACT spray   Commonly known as:  FLONASE   Spray 1-2 sprays into both nostrils daily                                hydrALAZINE 100 MG Tabs tablet   Commonly known as:  APRESOLINE   Take 1 tablet (100 mg) by mouth 3 times daily                                * insulin isophane human 100 UNIT/ML injection   Commonly known as:  HumuLIN N PEN   Inject 10 Units Subcutaneous daily (with dinner)                                * insulin isophane human 100 UNIT/ML injection   Commonly known as:  HumuLIN N PEN   Inject 26 Units Subcutaneous every morning (before breakfast)                                 isosorbide dinitrate 10 MG tablet   Commonly known as:  ISORDIL   Take 1 tablet (10 mg) by mouth 3 times daily                                loratadine 10 MG tablet   Commonly known as:  CLARITIN   Take 10 mg by mouth daily as needed Reported on 5/3/2017                                melatonin 1 MG Tabs tablet   Take 1 tablet (1 mg) by mouth nightly as needed for sleep                                mycophenolate 250 MG capsule   Commonly known as:  GENERIC EQUIVALENT   Take 3 capsules (750 mg) by mouth 2 times daily                                NEPHROCAPS 1 MG capsule   Take 1 capsule by mouth daily                                nystatin 312703 UNIT/ML suspension   Commonly known as:  MYCOSTATIN   Swish and swallow 10 mLs (1,000,000 Units) in mouth 4 times daily                                order for DME   Equipment being ordered: Carol () Treatment Diagnosis: ESRD on PD Pt has to be connected to PD all night and can not be disconnected, hence impending his mobility to go to the bathroom. At risk for infection if he does not have this equipment.                                pantoprazole 40 MG EC tablet   Commonly known as:  PROTONIX   Take 1 tablet (40 mg) by mouth every morning                                pravastatin 40 MG tablet   Commonly known as:  PRAVACHOL   Take 1 tablet (40 mg) by mouth daily                                * predniSONE 20 MG tablet   Commonly known as:  DELTASONE   Take 1 tablet (20 mg) by mouth every morning                                * predniSONE 5 MG tablet   Commonly known as:  DELTASONE   Take 3 tablets (15 mg) by mouth 2 times daily                                senna-docusate 8.6-50 MG per tablet   Commonly known as:  SENOKOT-S;PERICOLACE   Take 1-2 tablets by mouth 2 times daily as needed for constipation                                simethicone 80 MG chewable tablet   Commonly known as:  MYLICON   Take 1 tablet (80 mg) by mouth  every 6 hours as needed for cramping                                * tacrolimus 0.5 MG capsule   Commonly known as:  GENERIC EQUIVALENT   Take one capsule with three 1 mg capsules (3.5 mg) every AM                                * tacrolimus 1 MG capsule   Commonly known as:  GENERIC EQUIVALENT   Take three capsules with one 0.5 mg capsule (3.5 mg) every AM and three capsules (3 mg) every PM                                terbutaline 5 MG tablet   Commonly known as:  BRETHINE   Take half tablet 2.5 mg three times daily.                                traMADol 50 MG tablet   Commonly known as:  ULTRAM   Take 1 tablet (50 mg) by mouth every 6 hours as needed for moderate pain                                triamcinolone 0.1 % ointment   Commonly known as:  KENALOG   Apply topically 2 times daily                                valGANciclovir 450 MG tablet   Commonly known as:  VALCYTE   Take 1 tablet (450 mg) by mouth twice a week                                * Notice:  This list has 6 medication(s) that are the same as other medications prescribed for you. Read the directions carefully, and ask your doctor or other care provider to review them with you.

## 2018-07-18 NOTE — NURSING NOTE
Dermatology Rooming Note    Murray Nicholson's goals for this visit include:   Chief Complaint   Patient presents with     Skin Check     Murray is here today for a skin check- has some reas of concern on his fingers, they're peeling.      Kiana Eduardo MA

## 2018-07-18 NOTE — TELEPHONE ENCOUNTER
Reviewed labs with Dr Ernst (WBC 1.1) - requested pt to decrease MMF to 750 mg BID - recheck labs on Friday 7/20/18.    Met with pt on 2A to review labs and med change. Pt verbalized understanding - enc to call with questions

## 2018-07-18 NOTE — IP AVS SNAPSHOT
[address] Observ[address] [address]    Phone:  [phone]    Fax:  [phone]                                       After Visit Summary   7/18/2018    Murray Nicholson    MRN: [id number]           After Visit Summary Signature Page     I have received my discharge instructions, and my questions have been answered. I have discussed any challenges I see with this plan with the nurse or doctor.    ..........................................................................................................................................  Patient/Patient Representative Signature      ..........................................................................................................................................  Patient Representative Print Name and Relationship to Patient    ..................................................               ................................................  Date                                            Time    ..........................................................................................................................................  Reviewed by Signature/Title    ...................................................              ..............................................  Date                                                            Time

## 2018-07-18 NOTE — PROGRESS NOTES
Pt received insulin and D50 per order, Magnesium is running; Cath lab charge nurse made aware. RN from cath lab here to  pt for procedure. VSS see flow sheet. Pt notified ride by phone procedure would run later. Pt will recover on 6D post.

## 2018-07-19 ENCOUNTER — TELEPHONE (OUTPATIENT)
Dept: TRANSPLANT | Facility: CLINIC | Age: 63
End: 2018-07-19

## 2018-07-19 DIAGNOSIS — E78.5 DYSLIPIDEMIA: ICD-10-CM

## 2018-07-19 DIAGNOSIS — Z94.1 HEART TRANSPLANT RECIPIENT (H): ICD-10-CM

## 2018-07-19 LAB
CMV DNA SPEC NAA+PROBE-ACNC: NORMAL [IU]/ML
CMV DNA SPEC NAA+PROBE-LOG#: NORMAL {LOG_IU}/ML
DONOR IDENTIFICATION: NORMAL
DSA COMMENTS: NORMAL
DSA PRESENT: NO
DSA TEST METHOD: NORMAL
EBV DNA # SPEC NAA+PROBE: NORMAL {COPIES}/ML
EBV DNA SPEC NAA+PROBE-LOG#: NORMAL {LOG_COPIES}/ML
INTERPRETATION ECG - MUSE: NORMAL
ORGAN: NORMAL
PRA DONOR SPECIFIC ABY: NORMAL
PRA SINGLE ANTIGEN IGG ANTIBODY: NORMAL
SA1 CELL: NORMAL
SA1 COMMENTS: NORMAL
SA1 HI RISK ABY: NORMAL
SA1 MOD RISK ABY: NORMAL
SA1 TEST METHOD: NORMAL
SA2 CELL: NORMAL
SA2 COMMENTS: NORMAL
SA2 HI RISK ABY UA: NORMAL
SA2 MOD RISK ABY: NORMAL
SA2 TEST METHOD: NORMAL
SPECIMEN SOURCE: NORMAL

## 2018-07-19 RX ORDER — PRAVASTATIN SODIUM 40 MG
40 TABLET ORAL DAILY
Qty: 90 TABLET | Refills: 0 | Status: ON HOLD | OUTPATIENT
Start: 2018-07-19 | End: 2018-09-11

## 2018-07-19 RX ORDER — TACROLIMUS 1 MG/1
CAPSULE ORAL
Qty: 180 CAPSULE | Refills: 11 | Status: SHIPPED | OUTPATIENT
Start: 2018-07-19 | End: 2018-07-24

## 2018-07-19 RX ORDER — ROSUVASTATIN CALCIUM 20 MG/1
20 TABLET, COATED ORAL DAILY
Qty: 30 TABLET | Refills: 1 | Status: ON HOLD | OUTPATIENT
Start: 2018-07-19 | End: 2018-12-03

## 2018-07-19 RX ORDER — TACROLIMUS 0.5 MG/1
CAPSULE ORAL
Qty: 60 CAPSULE | Refills: 11 | Status: SHIPPED | OUTPATIENT
Start: 2018-07-19 | End: 2018-07-24

## 2018-07-19 NOTE — PROGRESS NOTES
TR band deflated and off. Wrist is soft to palpation with no bleeding or hematoma. Patient ready for discharge. This nurse went through all discharge instructions, patient verbalized understanding, all questions answered. Patient left via wheelchair with all belongings to meet friend in lobby.

## 2018-07-19 NOTE — DISCHARGE INSTRUCTIONS
Patient Name: Murray Nicholson  Date of Procedure: July 18, 2018      After you go home:    Have an adult stay with you for 24 hours.    Drink plenty of fluids.    You may eat your normal diet, unless your doctor tells you otherwise.    For 24 hours:    Relax and take it easy.    Do NOT smoke.    Do NOT make any important or legal decisions.    Do NOT drive or operate machines at home or at work.    Do NOT drink alcohol.    Remove the Band-Aid after 24 hours. If there is minor oozing, apply another Band-aid and remove it after 12 hours.    For 2 days, do NOT have sex or do any heavy exercise.    Do NOT take a bath, or use a hot tub or pool for at least 3 days. You may shower.      Care of wrist or arm site  It is normal to have soreness at the puncture site and mild tingling in your hand for up to 3 days.    For 2 days, do not use your hand or arm to support your weight (such as rising from a chair) or bend your wrist (such as lifting a garage door).    For 2 days, do not lift more than 5 pounds or exercise your arm (tennis, golf or bowling).    If you start bleeding from the site in your arm:    Sit down and press firmly on the site with your fingers for 10 minutes. Call your doctor as soon as you can.    If the bleeding stops, sit still and keep your wrist straight for 2 hours.    Medicines    If you have started taking Plavix or Effient, do not stop taking it until you talk to your heart doctor (cardiologist).    If you are on metformin (Glucophage), do not restart it until you have blood tests (within 2 to 3 days after discharge). When your doctor tells you it is safe, you may restart the metformin.    If you have stopped any other medicines, check with your nurse or provider about when to restart them.    Call 911 right away if you have bleeding that is heavy or does not stop.    Call your doctor if:    You have a large or growing hard lump around the site.    The site is red, swollen, hot or tender.    Blood  or fluid is draining from the site.    You have chills or a fever greater than 101 F (38 C).    Your leg or arm feels numb or cool.    You have hives, a rash or unusual itching.      [facility]:  [phone] (7 days a week)

## 2018-07-19 NOTE — TELEPHONE ENCOUNTER
Reviewed pt appts from 7/20 - including need to recheck WBC.    FK level 7.5 per pt ~13 hour trough.    Dose increased from 3.5/3 mg to 3.5 mg BID.   Recheck 7/27/18.    Also instructed pt per Dr RAPP plan to switch to Crestor from Pravastatin once current prescription is complete.     Pt verbalized understanding of med changes, will see in clinic 7/20/18.

## 2018-07-20 ENCOUNTER — PRE VISIT (OUTPATIENT)
Dept: TRANSPLANT | Facility: CLINIC | Age: 63
End: 2018-07-20

## 2018-07-20 ENCOUNTER — RADIANT APPOINTMENT (OUTPATIENT)
Dept: CARDIOLOGY | Facility: CLINIC | Age: 63
End: 2018-07-20
Attending: INTERNAL MEDICINE
Payer: MEDICARE

## 2018-07-20 ENCOUNTER — RADIANT APPOINTMENT (OUTPATIENT)
Dept: GENERAL RADIOLOGY | Facility: CLINIC | Age: 63
End: 2018-07-20
Attending: INTERNAL MEDICINE
Payer: MEDICARE

## 2018-07-20 ENCOUNTER — OFFICE VISIT (OUTPATIENT)
Dept: CARDIOLOGY | Facility: CLINIC | Age: 63
End: 2018-07-20
Attending: NURSE PRACTITIONER
Payer: MEDICARE

## 2018-07-20 ENCOUNTER — INFUSION THERAPY VISIT (OUTPATIENT)
Dept: INFUSION THERAPY | Facility: CLINIC | Age: 63
End: 2018-07-20
Attending: INTERNAL MEDICINE
Payer: MEDICARE

## 2018-07-20 VITALS
TEMPERATURE: 98 F | OXYGEN SATURATION: 100 % | SYSTOLIC BLOOD PRESSURE: 139 MMHG | DIASTOLIC BLOOD PRESSURE: 81 MMHG | HEART RATE: 96 BPM

## 2018-07-20 VITALS
SYSTOLIC BLOOD PRESSURE: 114 MMHG | OXYGEN SATURATION: 100 % | HEIGHT: 69 IN | HEART RATE: 103 BPM | DIASTOLIC BLOOD PRESSURE: 69 MMHG | BODY MASS INDEX: 25.49 KG/M2 | WEIGHT: 172.1 LBS

## 2018-07-20 DIAGNOSIS — Z94.1 HEART REPLACED BY TRANSPLANT (H): ICD-10-CM

## 2018-07-20 DIAGNOSIS — Z94.1 HEART REPLACED BY TRANSPLANT (H): Primary | ICD-10-CM

## 2018-07-20 DIAGNOSIS — D63.1 ANEMIA IN STAGE 5 CHRONIC KIDNEY DISEASE (H): ICD-10-CM

## 2018-07-20 DIAGNOSIS — I10 ESSENTIAL HYPERTENSION: ICD-10-CM

## 2018-07-20 DIAGNOSIS — Z94.1 HEART TRANSPLANTED (H): ICD-10-CM

## 2018-07-20 DIAGNOSIS — N18.5 ANEMIA IN STAGE 5 CHRONIC KIDNEY DISEASE (H): ICD-10-CM

## 2018-07-20 DIAGNOSIS — E11.9 DIABETES MELLITUS (H): ICD-10-CM

## 2018-07-20 DIAGNOSIS — E87.6 HYPOKALEMIA: ICD-10-CM

## 2018-07-20 DIAGNOSIS — N18.5 CKD (CHRONIC KIDNEY DISEASE) STAGE 5, GFR LESS THAN 15 ML/MIN (H): ICD-10-CM

## 2018-07-20 DIAGNOSIS — D72.819 LEUKOPENIA, UNSPECIFIED TYPE: ICD-10-CM

## 2018-07-20 DIAGNOSIS — Z94.1 HEART TRANSPLANT RECIPIENT (H): ICD-10-CM

## 2018-07-20 DIAGNOSIS — I71.21 ASCENDING AORTIC ANEURYSM (H): ICD-10-CM

## 2018-07-20 DIAGNOSIS — E11.22 TYPE 2 DIABETES MELLITUS WITH STAGE 5 CHRONIC KIDNEY DISEASE NOT ON CHRONIC DIALYSIS, WITHOUT LONG-TERM CURRENT USE OF INSULIN (H): ICD-10-CM

## 2018-07-20 DIAGNOSIS — D72.819 LEUKOPENIA: Primary | ICD-10-CM

## 2018-07-20 DIAGNOSIS — N18.5 TYPE 2 DIABETES MELLITUS WITH STAGE 5 CHRONIC KIDNEY DISEASE NOT ON CHRONIC DIALYSIS, WITHOUT LONG-TERM CURRENT USE OF INSULIN (H): ICD-10-CM

## 2018-07-20 DIAGNOSIS — Z94.1 HEART TRANSPLANTED (H): Primary | ICD-10-CM

## 2018-07-20 LAB
ANION GAP SERPL CALCULATED.3IONS-SCNC: 10 MMOL/L (ref 3–14)
ANISOCYTOSIS BLD QL SMEAR: ABNORMAL
BASOPHILS # BLD AUTO: 0 10E9/L (ref 0–0.2)
BASOPHILS NFR BLD AUTO: 0 %
BUN SERPL-MCNC: 40 MG/DL (ref 7–30)
CALCIUM SERPL-MCNC: 8.7 MG/DL (ref 8.5–10.1)
CHLORIDE SERPL-SCNC: 99 MMOL/L (ref 94–109)
CO2 SERPL-SCNC: 22 MMOL/L (ref 20–32)
COPATH REPORT: NORMAL
CREAT SERPL-MCNC: 4.27 MG/DL (ref 0.66–1.25)
DACRYOCYTES BLD QL SMEAR: SLIGHT
DIFFERENTIAL METHOD BLD: ABNORMAL
EOSINOPHIL # BLD AUTO: 0 10E9/L (ref 0–0.7)
EOSINOPHIL NFR BLD AUTO: 0 %
ERYTHROCYTE [DISTWIDTH] IN BLOOD BY AUTOMATED COUNT: 20.8 % (ref 10–15)
GFR SERPL CREATININE-BSD FRML MDRD: 14 ML/MIN/1.7M2
GLUCOSE SERPL-MCNC: 173 MG/DL (ref 70–99)
HCT VFR BLD AUTO: 23.3 % (ref 40–53)
HGB BLD-MCNC: 7.5 G/DL (ref 13.3–17.7)
LYMPHOCYTES # BLD AUTO: 0.2 10E9/L (ref 0.8–5.3)
LYMPHOCYTES NFR BLD AUTO: 15.9 %
MCH RBC QN AUTO: 30.2 PG (ref 26.5–33)
MCHC RBC AUTO-ENTMCNC: 32.2 G/DL (ref 31.5–36.5)
MCV RBC AUTO: 94 FL (ref 78–100)
MONOCYTES # BLD AUTO: 0.1 10E9/L (ref 0–1.3)
MONOCYTES NFR BLD AUTO: 6.2 %
NEUTROPHILS # BLD AUTO: 0.8 10E9/L (ref 1.6–8.3)
NEUTROPHILS NFR BLD AUTO: 77.9 %
OVALOCYTES BLD QL SMEAR: SLIGHT
PLATELET # BLD AUTO: 257 10E9/L (ref 150–450)
PLATELET # BLD EST: ABNORMAL 10*3/UL
POIKILOCYTOSIS BLD QL SMEAR: ABNORMAL
POTASSIUM SERPL-SCNC: 4.9 MMOL/L (ref 3.4–5.3)
RBC # BLD AUTO: 2.48 10E12/L (ref 4.4–5.9)
SODIUM SERPL-SCNC: 131 MMOL/L (ref 133–144)
WBC # BLD AUTO: 1 10E9/L (ref 4–11)

## 2018-07-20 PROCEDURE — 85025 COMPLETE CBC W/AUTO DIFF WBC: CPT | Performed by: INTERNAL MEDICINE

## 2018-07-20 PROCEDURE — 99214 OFFICE O/P EST MOD 30 MIN: CPT | Mod: ZP | Performed by: NURSE PRACTITIONER

## 2018-07-20 PROCEDURE — G0463 HOSPITAL OUTPT CLINIC VISIT: HCPCS | Mod: 25

## 2018-07-20 PROCEDURE — 80048 BASIC METABOLIC PNL TOTAL CA: CPT | Performed by: INTERNAL MEDICINE

## 2018-07-20 PROCEDURE — 25000125 ZZHC RX 250: Mod: ZF | Performed by: INTERNAL MEDICINE

## 2018-07-20 PROCEDURE — 36415 COLL VENOUS BLD VENIPUNCTURE: CPT | Performed by: INTERNAL MEDICINE

## 2018-07-20 PROCEDURE — 94642 AEROSOL INHALATION TREATMENT: CPT

## 2018-07-20 RX ORDER — PREDNISONE 5 MG/1
TABLET ORAL
Qty: 180 TABLET | Refills: 0
Start: 2018-07-20 | End: 2018-07-24

## 2018-07-20 RX ORDER — PENTAMIDINE ISETHIONATE 300 MG/300MG
300 INHALANT RESPIRATORY (INHALATION) ONCE
Status: CANCELLED | OUTPATIENT
Start: 2018-07-20 | End: 2018-07-20

## 2018-07-20 RX ORDER — ALBUTEROL SULFATE 0.83 MG/ML
2.5 SOLUTION RESPIRATORY (INHALATION) ONCE
Status: COMPLETED | OUTPATIENT
Start: 2018-07-20 | End: 2018-07-20

## 2018-07-20 RX ORDER — PREDNISONE 20 MG/1
20 TABLET ORAL 2 TIMES DAILY
Qty: 30 TABLET | Refills: 0
Start: 2018-07-20 | End: 2018-07-24

## 2018-07-20 RX ORDER — LIDOCAINE 40 MG/G
CREAM TOPICAL
Status: CANCELLED | OUTPATIENT
Start: 2018-07-20 | End: 2018-07-27

## 2018-07-20 RX ORDER — PENTAMIDINE ISETHIONATE 300 MG/300MG
300 INHALANT RESPIRATORY (INHALATION) ONCE
Status: COMPLETED | OUTPATIENT
Start: 2018-07-20 | End: 2018-07-20

## 2018-07-20 RX ORDER — HYDRALAZINE HYDROCHLORIDE 25 MG/1
25 TABLET, FILM COATED ORAL 3 TIMES DAILY
Qty: 90 TABLET | Refills: 11 | Status: ON HOLD | OUTPATIENT
Start: 2018-07-20 | End: 2018-08-06

## 2018-07-20 RX ORDER — ALBUTEROL SULFATE 0.83 MG/ML
2.5 SOLUTION RESPIRATORY (INHALATION) ONCE
Status: CANCELLED | OUTPATIENT
Start: 2018-07-20 | End: 2018-07-20

## 2018-07-20 RX ADMIN — PENTAMIDINE ISETHIONATE 300 MG: 300 INHALANT RESPIRATORY (INHALATION) at 08:31

## 2018-07-20 RX ADMIN — ALBUTEROL SULFATE 2.5 MG: 2.5 SOLUTION RESPIRATORY (INHALATION) at 08:15

## 2018-07-20 ASSESSMENT — ENCOUNTER SYMPTOMS
PALPITATIONS: 0
SYNCOPE: 0
NUMBNESS: 0
SLEEP DISTURBANCES DUE TO BREATHING: 0
DISTURBANCES IN COORDINATION: 0
DIARRHEA: 1
LEG PAIN: 0
POLYPHAGIA: 0
HEADACHES: 1
LIGHT-HEADEDNESS: 1
NIGHT SWEATS: 0
HYPERTENSION: 1
SINUS CONGESTION: 0
ARTHRALGIAS: 0
DIZZINESS: 1
NECK MASS: 0
VOMITING: 0
SPEECH CHANGE: 0
WEIGHT GAIN: 0
TINGLING: 0
MYALGIAS: 0
ORTHOPNEA: 0
BOWEL INCONTINENCE: 0
PARALYSIS: 0
POOR WOUND HEALING: 0
RECTAL PAIN: 0
TASTE DISTURBANCE: 0
SWOLLEN GLANDS: 0
SKIN CHANGES: 0
JAUNDICE: 0
SORE THROAT: 0
SMELL DISTURBANCE: 0
ALTERED TEMPERATURE REGULATION: 1
LOSS OF CONSCIOUSNESS: 0
FATIGUE: 1
STIFFNESS: 0
INCREASED ENERGY: 1
BRUISES/BLEEDS EASILY: 0
WEAKNESS: 1
NAIL CHANGES: 1
HALLUCINATIONS: 0
HYPOTENSION: 1
CONSTIPATION: 0
FEVER: 0
TREMORS: 1
HEARTBURN: 0
MUSCLE CRAMPS: 0
POLYDIPSIA: 0
MEMORY LOSS: 0
SEIZURES: 0
JOINT SWELLING: 0
HOARSE VOICE: 1
BLOOD IN STOOL: 0
EXERCISE INTOLERANCE: 1
BACK PAIN: 0
TROUBLE SWALLOWING: 0
NECK PAIN: 1
ABDOMINAL PAIN: 0
DECREASED APPETITE: 0
WEIGHT LOSS: 0
NAUSEA: 0
MUSCLE WEAKNESS: 1
CHILLS: 1
BLOATING: 0

## 2018-07-20 ASSESSMENT — PAIN SCALES - GENERAL: PAINLEVEL: NO PAIN (0)

## 2018-07-20 NOTE — MR AVS SNAPSHOT
After Visit Summary   7/20/2018    Murray Nicholson    MRN: [id number]           Patient Information     Date Of Birth          1955        Visit Information        Provider Department      7/20/2018 8:00 AM LOS 47 ATC;  SPEC [facility] Specialty and Procedure        Today's Diagnoses     Leukopenia    -  1    Heart transplanted (H)        Anemia in stage 5 chronic kidney disease (H)        CKD (chronic kidney disease) stage 5, GFR less than 15 ml/min (H)          Care Instructions      Pentamidine inhalation  Brand Name: NebuPent  What is this medicine?  PENTAMIDINE (pen KOROMA i trevon) is an anti-infective drug. It is used to prevent Pneumocystis carinii pneumonia (PCP).  How should I use this medicine?  This medicine is used in a nebulizer. Nebulizers make a liquid into an aerosol that you breathe in through your mouth or your mouth and nose into your lungs. It is usually given by a health care professional in a hospital or clinic setting. Take your medicine at regular intervals. Do not use more often than directed. Do not stop using except on the advice of your doctor or health care professional.  Talk to your pediatrician regarding the use of this medicine in children. Special care may be needed.  What side effects may I notice from receiving this medicine?  Side effects that you should report to your doctor or health care professional as soon as possible:    allergic reactions like skin rash, itching or hives, swelling of the face, lips, or tongue    breathing problems    chest pain    cough    fast, irregular heartbeat    feeling faint or lightheaded, falls    fever, chills    low blood pressure    redness, blistering, peeling or loosening of the skin, including inside the mouth    stomach pain, vomiting    trouble passing urine or change in the amount of urine    unusual bleeding or bruising    unusually weak or tired    white patches, sores in the  mouth    yellowing of the eyes or skin  Side effects that usually do not require medical attention (report to your doctor or health care professional if they continue or are bothersome):    diarrhea    dry mouth    headache    metal taste    muscle pain    nausea    night sweats  What may interact with this medicine?  This medicine may interact with the following medications:    amphotericin B    certain antibiotics like gentamicin, tobramycin, vancomycin    cisplatin    foscarnet  What if I miss a dose?  It is important not to miss your dose. Call your doctor or health care professional if you are unable to keep an appointment.  Where should I keep my medicine?  This drug is given in a hospital or clinic and will not be stored at home.  What should I tell my health care provider before I take this medicine?  They need to know if you have any of these conditions:    asthma    diabetes    heart problems    kidney disease    smoker    an unusual or allergic reaction to pentamidine, other medicines, foods, dyed, or preservatives    pregnant or trying to get pregnant    breast-feeding  What should I watch for while using this medicine?  See your doctor for regular check ups. Tell your doctor if you have any breathing problems, fever, or infection. You will need to have important blood work done while you are taking this medicine.  Do not mix this medicine with any other medicines in the nebulizer. Do not use the nebulizer for this medicine to inhale any other medicine.  This medicine can change your blood sugar levels. High blood sugar can cause more thirst and more urine passed, loss of appetite, fruity breath odor, and drowsiness. Low blood sugar can cause hunger, pale skin, headache, anxiety, chills or cold sweats, and shakiness. Call your doctor or health care professional if you think you have a problem with your blood sugar level.  NOTE:This sheet is a summary. It may not cover all possible information. If you  have questions about this medicine, talk to your doctor, pharmacist, or health care provider. Copyright  2018 [employer]                Follow-ups after your visit        Your next 10 appointments already scheduled     Jul 20, 2018  9:15 AM CDT   LAB with  LAB    Health Lab ([facility])    [address] [place] [id number] [phone]           Please do not eat 10-12 hours before your appointment if you are coming in fasting for labs on lipids, cholesterol, or glucose (sugar). This does not apply to pregnant women. Water, hot tea and black coffee (with nothing added) are okay. Do not drink other fluids, diet soda or chew gum.            Jul 20, 2018 10:00 AM CDT   Ech Complete with [id number]   [facility] ([facility])    [address] [place] [id number] [phone]           1.  Please bring or wear a comfortable two-piece outfit. 2.  You may eat, drink and take your normal medicines. 3.  For any questions that cannot be answered, please contact the ordering physician 4.  Please do not wear perfumes or scented lotions on the day of your exam.            Jul 20, 2018 11:00 AM CDT   XR CHEST 2 VIEWS with UCXR1   [facility] ([facility])    [address] [place] [id number] [phone]           Please bring a list of your current medicines to your exam. (Include vitamins, minerals and over-thecounter medicines.) Leave your valuables at home.  Tell your doctor if there is a chance you may be pregnant.  You do not need to do anything special for this exam.            Jul 20, 2018 12:00 PM CDT   (Arrive by 11:45 AM)   RETURN HEART TRANSPLANT with VERONICA Rocha [facility] ([facility])    [address] [place] [address]  [address]  [facility] [id number] [phone]            Jul 25, 2018  9:00 AM CDT    TELEMEDICINE with Eduardo Lea [facility] ([facility])    [address]  3rd Floor  [facility] [id number] [phone]           Note: this is not an onsite visit; there is no need to come to the facility.            Jul 27, 2018  8:30 AM CDT   Lab with  LAB    Health Lab ([facility])    [address]  1st Floor  [facility] [id number] [phone]            Jul 27, 2018  9:00 AM CDT   (Arrive by 8:45 AM)   RETURN HEART TRANSPLANT with Klever Ernst MD   [facility] ([facility])    [address]  [address]  [facility] [id number] [phone]            Jul 27, 2018 11:00 AM CDT   Procedure - 2.5 hour with U2A ROOM 17   [address] [facility] ([facility], [facility])    [address] [address]               Jul 27, 2018 12:00 PM CDT   Heart Cath Heart Biopsy with [id number]   [facility])    [address] [address] [phone]            Aug 10, 2018  2:30 PM CDT   (Arrive by 2:15 PM)   RETURN HEART TRANSPLANT with VERONICA Rocha CNP   [facility] ([facility])    [address] [place] [address]  [address]  [facility] [id number] [phone]              Who to contact     If you have questions or need follow up information about today's clinic visit or your schedule please contact [facility] SPECIALTY AND PROCEDURE directly at [phone].  Normal or non-critical lab and imaging results will be communicated to you by MyChart, letter or phone within 4 business days after the clinic has received the results. If you do not hear from us within 7 days, please contact the clinic through MyChart or phone. If you have a critical or abnormal lab result, we  will notify you by phone as soon as possible.  Submit refill requests through [employer] or call your pharmacy and they will forward the refill request to us. Please allow 3 business days for your refill to be completed.          Additional Information About Your Visit        [employer] Information     [employer] gives you secure access to your electronic health record. If you see a primary care provider, you can also send messages to your care team and make appointments. If you have questions, please call your primary care clinic.  If you do not have a primary care provider, please call [phone] and they will assist you.        Care EveryWhere ID     This is your Care EveryWhere ID. This could be used by other organizations to access your [facility] medical records  [id number]        Your Vitals Were     Pulse Temperature Pulse Oximetry             102 98  F (36.7  C) (Oral) 100%          Blood Pressure from Last 3 Encounters:   07/20/18 117/70   07/18/18 125/66   07/10/18 133/82    Weight from Last 3 Encounters:   07/10/18 79.9 kg (176 lb 1.6 oz)   07/06/18 80.3 kg (177 lb)   07/05/18 77.8 kg (171 lb 8.3 oz)              Today, you had the following     No orders found for display       Primary Care Provider Office Phone # Fax #    Yahir Alex Turcios [phone]       [address]  [facility] [address]        Goals        Lifestyle    Increase physical activity     Notes - Note created  7/3/2018  1:51 PM by Carrie Tran RN    Goal Statement: I will start cardiac rehab  Measure of Success: starts cardiac rehab  Supportive Steps to Achieve: support of RN CC  Barriers: none  Strengths: willingness to participate   Date to Achieve By: 7-15-18          Equal Access to Services     [facility]: Hadii aad felipe hernandez Sotracey, waaxda luqadaha, qaybta kaalmajose peña. So [facility] [phone].    ATENCIÓN: Si habla español, tiene a ortiz disposición servicios  sera de asistencia lingüística. Keely rodriguez [phone].    We comply with applicable federal civil rights laws and [place] laws. We do not discriminate on the basis of race, color, national origin, age, disability, sex, sexual orientation, or gender identity.            Thank you!     Thank you for choosing [facility] SPECIALTY AND PROCEDURE  for your care. Our goal is always to provide you with excellent care. Hearing back from our patients is one way we can continue to improve our services. Please take a few minutes to complete the written survey that you may receive in the mail after your visit with us. Thank you!             Your Updated Medication List - Protect others around you: Learn how to safely use, store and throw away your medicines at www.disposemymeds.org.          This list is accurate as of 7/20/18  8:33 AM.  Always use your most recent med list.                   Brand Name Dispense Instructions for use Diagnosis    acetaminophen 325 MG tablet    TYLENOL    100 tablet    Take 2 tablets (650 mg) by mouth every 4 hours as needed for mild pain (multimodal surgical pain management along with NSAIDS and opioid medication as indicated based on pain control and physical function.)    Acute post-operative pain       albuterol 108 (90 Base) MCG/ACT Inhaler    PROAIR HFA/PROVENTIL HFA/VENTOLIN HFA    1 Inhaler    Inhale 6 puffs into the lungs every 4 hours as needed for shortness of breath / dyspnea    Dyspnea on exertion       apixaban ANTICOAGULANT 2.5 MG tablet    ELIQUIS    120 tablet    Take 1 tablet (2.5 mg) by mouth 2 times daily    Heart transplanted (H)       aspirin 81 MG tablet      Take 1 tablet (81 mg) by mouth at bedtime        biotin 5 MG Caps    BIOTIN 5000    30 capsule    Take 5 mg by mouth daily    Brittle nails       bismuth subsalicylate 262 MG/15ML suspension    PEPTO BISMOL     Take 15 mLs by mouth every 6 hours as needed for indigestion        blood  glucose monitoring lancets     102 each    Use to test blood sugar 2-3 times daily or as directed.  Ok to substitute alternative if insurance prefers.    Type 2 diabetes mellitus with stage 5 chronic kidney disease not on chronic dialysis, without long-term current use of insulin (H)       blood glucose monitoring test strip    no brand specified    100 each    Use to test blood sugar 2-3 times daily or as directed.    Type 2 diabetes mellitus with stage 5 chronic kidney disease not on chronic dialysis, without long-term current use of insulin (H)       fluticasone 50 MCG/ACT spray    FLONASE    16 g    Spray 1-2 sprays into both nostrils daily    Nasal congestion       hydrALAZINE 100 MG Tabs tablet    APRESOLINE    90 tablet    Take 1 tablet (100 mg) by mouth 3 times daily    Heart transplant recipient (H)       * insulin isophane human 100 UNIT/ML injection    HumuLIN N PEN    3 mL    Inject 10 Units Subcutaneous daily (with dinner)    Type 2 diabetes mellitus with stage 5 chronic kidney disease not on chronic dialysis, without long-term current use of insulin (H)       * insulin isophane human 100 UNIT/ML injection    HumuLIN N PEN    3 mL    Inject 26 Units Subcutaneous every morning (before breakfast)    Type 2 diabetes mellitus with stage 5 chronic kidney disease not on chronic dialysis, without long-term current use of insulin (H)       isosorbide dinitrate 10 MG tablet    ISORDIL    120 tablet    Take 1 tablet (10 mg) by mouth 3 times daily    Heart transplanted (H), Hypertension goal BP (blood pressure) < 130/80       loratadine 10 MG tablet    CLARITIN     Take 10 mg by mouth daily as needed Reported on 5/3/2017    Hypertensive cardiopathy, SOB (shortness of breath)       melatonin 1 MG Tabs tablet     120 tablet    Take 1 tablet (1 mg) by mouth nightly as needed for sleep    Heart transplanted (H)       mycophenolate 250 MG capsule    GENERIC EQUIVALENT    180 capsule    Take 3 capsules (750 mg) by mouth  2 times daily    Heart transplanted (H)       NEPHROCAPS 1 MG capsule     120 capsule    Take 1 capsule by mouth daily        nystatin [id number] UNIT/ML suspension    MYCOSTATIN    400 mL    Swish and swallow 10 mLs (1,000,000 Units) in mouth 4 times daily    Heart transplant recipient (H)       order for DME     1 Units    Equipment being ordered: Carol () Treatment Diagnosis: ESRD on PD Pt has to be connected to PD all night and can not be disconnected, hence impending his mobility to go to the bathroom. At risk for infection if he does not have this equipment.    CKD (chronic kidney disease) stage 5, GFR less than 15 ml/min (H)       pantoprazole 40 MG EC tablet    PROTONIX    30 tablet    Take 1 tablet (40 mg) by mouth every morning    Heart transplant recipient (H)       pravastatin 40 MG tablet    PRAVACHOL    90 tablet    Take 1 tablet (40 mg) by mouth daily DC after current prescription completed; replace with rosuvastatin.    Dyslipidemia       * predniSONE 20 MG tablet    DELTASONE    30 tablet    Take 1 tablet (20 mg) by mouth every morning    Heart transplant recipient (H)       * predniSONE 5 MG tablet    DELTASONE    180 tablet    Take 3 tablets (15 mg) by mouth 2 times daily    Heart transplant recipient (H)       rosuvastatin 20 MG tablet    CRESTOR    30 tablet    Take 1 tablet (20 mg) by mouth daily    Heart transplant recipient (H)       senna-docusate 8.6-50 MG per tablet    SENOKOT-S;PERICOLACE    100 tablet    Take 1-2 tablets by mouth 2 times daily as needed for constipation    Constipation, unspecified constipation type       simethicone 80 MG chewable tablet    MYLICON    180 tablet    Take 1 tablet (80 mg) by mouth every 6 hours as needed for cramping    Constipation, unspecified constipation type       * tacrolimus 0.5 MG capsule    GENERIC EQUIVALENT    60 capsule    Take one capsule with three 1 mg capsules (3.5 mg) every AM and PM    Heart transplant recipient (H)       * tacrolimus  1 MG capsule    GENERIC EQUIVALENT    180 capsule    Take three capsules with one 0.5 mg capsule (3.5 mg) every AM and PM    Heart transplant recipient (H)       terbutaline 5 MG tablet    BRETHINE    120 tablet    Take half tablet 2.5 mg three times daily.    Heart transplanted (H)       traMADol 50 MG tablet    ULTRAM    10 tablet    Take 1 tablet (50 mg) by mouth every 6 hours as needed for moderate pain    Acute post-operative pain       triamcinolone 0.1 % ointment    KENALOG    80 g    Apply topically 2 times daily    Xerosis of skin       valGANciclovir 450 MG tablet    VALCYTE    120 tablet    Take 1 tablet (450 mg) by mouth twice a week    Heart transplanted (H)       * Notice:  This list has 6 medication(s) that are the same as other medications prescribed for you. Read the directions carefully, and ask your doctor or other care provider to review them with you.

## 2018-07-20 NOTE — PATIENT INSTRUCTIONS
Pentamidine inhalation  Brand Name: NebuPent  What is this medicine?  PENTAMIDINE (pen KOROMA i trevon) is an anti-infective drug. It is used to prevent Pneumocystis carinii pneumonia (PCP).  How should I use this medicine?  This medicine is used in a nebulizer. Nebulizers make a liquid into an aerosol that you breathe in through your mouth or your mouth and nose into your lungs. It is usually given by a health care professional in a hospital or clinic setting. Take your medicine at regular intervals. Do not use more often than directed. Do not stop using except on the advice of your doctor or health care professional.  Talk to your pediatrician regarding the use of this medicine in children. Special care may be needed.  What side effects may I notice from receiving this medicine?  Side effects that you should report to your doctor or health care professional as soon as possible:    allergic reactions like skin rash, itching or hives, swelling of the face, lips, or tongue    breathing problems    chest pain    cough    fast, irregular heartbeat    feeling faint or lightheaded, falls    fever, chills    low blood pressure    redness, blistering, peeling or loosening of the skin, including inside the mouth    stomach pain, vomiting    trouble passing urine or change in the amount of urine    unusual bleeding or bruising    unusually weak or tired    white patches, sores in the mouth    yellowing of the eyes or skin  Side effects that usually do not require medical attention (report to your doctor or health care professional if they continue or are bothersome):    diarrhea    dry mouth    headache    metal taste    muscle pain    nausea    night sweats  What may interact with this medicine?  This medicine may interact with the following medications:    amphotericin B    certain antibiotics like gentamicin, tobramycin, vancomycin    cisplatin    foscarnet  What if I miss a dose?  It is important not to miss your dose. Call  your doctor or health care professional if you are unable to keep an appointment.  Where should I keep my medicine?  This drug is given in a hospital or clinic and will not be stored at home.  What should I tell my health care provider before I take this medicine?  They need to know if you have any of these conditions:    asthma    diabetes    heart problems    kidney disease    smoker    an unusual or allergic reaction to pentamidine, other medicines, foods, dyed, or preservatives    pregnant or trying to get pregnant    breast-feeding  What should I watch for while using this medicine?  See your doctor for regular check ups. Tell your doctor if you have any breathing problems, fever, or infection. You will need to have important blood work done while you are taking this medicine.  Do not mix this medicine with any other medicines in the nebulizer. Do not use the nebulizer for this medicine to inhale any other medicine.  This medicine can change your blood sugar levels. High blood sugar can cause more thirst and more urine passed, loss of appetite, fruity breath odor, and drowsiness. Low blood sugar can cause hunger, pale skin, headache, anxiety, chills or cold sweats, and shakiness. Call your doctor or health care professional if you think you have a problem with your blood sugar level.  NOTE:This sheet is a summary. It may not cover all possible information. If you have questions about this medicine, talk to your doctor, pharmacist, or health care provider. Copyright  2018 [employer]
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
## 2018-07-20 NOTE — PROGRESS NOTES
Patient is present to [facility] for a pentamidine procedure 1st dose. Name and  verified. Patient tolerated nebs well both on 7L of oxygen. For medication details see MAR. Patient rinsed mouth while in clinic. Patient educated on brushing teeth when getting home. Patient was discharged. Patient to return back in 1 month     RBrigitte Amaral LPN      Administrations This Visit     albuterol neb solution 2.5 mg     Admin Date Action Dose Route Administered By             2018 Given 2.5 mg Nebulization Patricia Amaral LPN                    pentamidine (NEBUPENT) neb solution 300 mg     Admin Date Action Dose Route Administered By             2018 Given 300 mg Inhalation Patricia Amaral LPN

## 2018-07-20 NOTE — NURSING NOTE
"Pt seen in clinic with LEWIS Suh for 4 week follow up post heart transplant. NP reviewed labs, VS and test results. HR stable - terbut dc'd. FK dose adjusted prior; confirmed dose. Recheck on 7/23. Pt reports BP at times low with dialysis, skipping hydralazine doses. NP adjusted dose and provided pt with guidelines. C/o hand tremors, \"jumpy\" legs, feeling more emotional. Assured pt this was Pred related. Reviewed pt med list and pill bottles. Pt taking notes in his note book and random pieces of paper - had not updated his med card and had not decreased pred after the last two biopsies. Pt using pill bottle to set up med box. Reiterated to pt that the pill bottles will not always match current dose. Writer understands that he won't always have his card with him but needs to update it as soon as he is home. Pt taking 20 mg BID Pred - discussed with NP, will decrease to 20/15 with NER biopsy (pending). Will discuss further with Dr RAPP at next appt if pred could decrease more than 5 mg per biopsy over next several weeks. WBC 1 - stressed to pt the importance of avoiding opportunities for infection. Stated he has hand  in his bag. Valcyte dc'd; checking CMV weekly. MMF recently decreased. Bactrim on hold - first Pentamidine tx today.  Pt states he is very busy with everything going on and declines cardiac rehab at this time. AVS reviewed in detail and provided to pt who verbalized understanding of next steps.    ~Please call your coordinator at [phone] with any questions or concerns.    ~Discontinue Terbutaline and Valcyte. Will need to check CMV weekly. Note: BOTTLES TAKEN OUT OF BAG TOS EPARATE FROM CURRENT MEDS  ~Next Pentamidine treatment in ~30 days. Will add to your schedule.   ~Decrease Hydralazine to 25 mg three times daily. If top number of BP <110; hold that dose. NEW PRESCRIPTION PROVIDED   ~Lillie will call with biopsy results and medication change.   ~Recheck labs with level on Monday 8AM  - ok " to eat before you come in.   ~Cont to take precautions against infections with your low WBC   ~See you next Friday for 6 week follow up.    ~Keep your med card up to date and bring with you to every clinic visit.

## 2018-07-20 NOTE — MR AVS SNAPSHOT
After Visit Summary   7/20/2018    Murray Nicholson    MRN: [id number]           Patient Information     Date Of Birth          1955        Visit Information        Provider Department      7/20/2018 12:00 PM Mellisa Suh, APRN CNP [facility]        Today's Diagnoses     Heart transplanted (H)    -  1    Essential hypertension        Ascending aortic aneurysm (H)        CKD (chronic kidney disease) stage 5, GFR less than 15 ml/min (H)        Leukopenia, unspecified type        Heart transplant recipient (H)        Type 2 diabetes mellitus with stage 5 chronic kidney disease not on chronic dialysis, without long-term current use of insulin (H)          Care Instructions    Please call your coordinator at [phone] with any questions or concerns.      Discontinue Terbutaline and Valcyte. Will need to check CMV weekly.     Next Pentamidine treatment in ~30 days. Will add to your schedule.     Decrease Hydralazine to 25 mg three times daily. If top number of BP <100; hold that dose.     Lillie will call with biopsy results and medication change.     Recheck labs with level on Monday 8AM  - ok to eat before you come in.     Cont to take precautions against infections with your low WBC     See you next Friday for 6 week follow up.      Keep your med card up to date and bring with you to every clinic visit.                            Follow-ups after your visit        Your next 10 appointments already scheduled     Jul 23, 2018  8:15 AM CDT   LAB with  LAB    Health Lab ([facility])    [address] [place] [address] [phone]           Please do not eat 10-12 hours before your appointment if you are coming in fasting for labs on lipids, cholesterol, or glucose (sugar). This does not apply to pregnant women. Water, hot tea and black coffee (with nothing added) are okay. Do not drink other fluids, diet soda or chew gum.            Jul  25, 2018  9:00 AM CDT   TELEMEDICINE with Eduardo Lea RPH   [facility] Medication Therapy Management ([facility])    [address]  3rd Floor  [facility] [address] [phone]           Note: this is not an onsite visit; there is no need to come to the facility.            Jul 27, 2018  8:30 AM CDT   Lab with [facility] LAB    Health Lab ([facility])    [address]  1st Floor  [facility] [address] [phone]            Jul 27, 2018  9:00 AM CDT   (Arrive by 8:45 AM)   RETURN HEART TRANSPLANT with Klever Ernst MD   [facility] Heart Care ([facility])    [address]  [address]  [place] [address] [phone]            Jul 27, 2018 11:00 AM CDT   Procedure - 2.5 hour with U2A ROOM 17   [address] [employer] ([facility])    [address] [address]               Jul 27, 2018 12:00 PM CDT   Heart Cath Heart Biopsy with UUHCVR4   [facility]  Heart Cath Lab ([facility])    [address] [address] [phone]            Aug 10, 2018 10:00 AM CDT   LAB with UU LAB GOLD WAITING   [facility], Lab ([facility])    [address] [address]              Please do not eat 10-12 hours before your appointment if you are coming in fasting for labs on lipids, cholesterol, or glucose (sugar). This does not apply to pregnant women. Water, hot tea and black coffee (with nothing added) are okay. Do not drink other fluids, diet soda or chew gum.            Aug 10, 2018 10:30 AM CDT   Procedure - 2.5 hour with U2A ROOM 15   [address] [employer] ([facility])    [address] [address]               Aug 10, 2018 11:30 AM CDT   Heart Cath Heart Biopsy with UUHCVR3  "  [facility] [place],  Heart Cath Lab ([facility] [place])    [address] [id number] [phone]            Aug 10, 2018  2:30 PM CDT   (Arrive by 2:15 PM)   RETURN HEART TRANSPLANT with VERONICA Rocha CNP   [facility] Heart [facility]    [address]  [address] [place] [address] [phone]              Who to contact     If you have questions or need follow up information about today's clinic visit or your schedule please contact [facility] directly at [phone].  Normal or non-critical lab and imaging results will be communicated to you by MyChart, letter or phone within 4 business days after the clinic has received the results. If you do not hear from us within 7 days, please contact the clinic through [employer] or phone. If you have a critical or abnormal lab result, we will notify you by phone as soon as possible.  Submit refill requests through [employer] or call your pharmacy and they will forward the refill request to us. Please allow 3 business days for your refill to be completed.          Additional Information About Your Visit        [employer] Information     [employer] gives you secure access to your electronic health record. If you see a primary care provider, you can also send messages to your care team and make appointments. If you have questions, please call your primary care clinic.  If you do not have a primary care provider, please call [phone] and they will assist you.        Care EveryWhere ID     This is your Care EveryWhere ID. This could be used by other organizations to access your [facility] medical records  [id number]        Your Vitals Were     Pulse Height Pulse Oximetry BMI (Body Mass Index)          103 1.753 m (5' 9\") 100% 25.41 kg/m2         Blood Pressure from Last 3 Encounters:   07/20/18 114/69   07/20/18 139/81   07/18/18 125/66    Weight from Last 3 Encounters: "   07/20/18 78.1 kg (172 lb 1.6 oz)   07/10/18 79.9 kg (176 lb 1.6 oz)   07/06/18 80.3 kg (177 lb)              Today, you had the following     No orders found for display         Today's Medication Changes          These changes are accurate as of 7/20/18 12:35 PM.  If you have any questions, ask your nurse or doctor.               These medicines have changed or have updated prescriptions.        Dose/Directions    hydrALAZINE 25 MG tablet   Commonly known as:  APRESOLINE   This may have changed:    - medication strength  - how much to take  - additional instructions   Used for:  Essential hypertension, Heart transplanted (H)   Changed by:  Mellisa Suh APRN CNP        Dose:  25 mg   Take 1 tablet (25 mg) by mouth 3 times daily Hold if top number BP less than 110.   Quantity:  90 tablet   Refills:  11       melatonin 1 MG Tabs tablet   This may have changed:  how much to take   Used for:  Heart transplanted (H)   Changed by:  Mellisa Suh APRN CNP        Dose:  2 mg   Take 2 tablets (2 mg) by mouth nightly as needed for sleep   Quantity:  120 tablet   Refills:  1       * predniSONE 5 MG tablet   Commonly known as:  DELTASONE   This may have changed:    - how much to take  - how to take this  - when to take this  - additional instructions   Used for:  Heart transplant recipient (H)   Changed by:  Mellisa Suh APRN CNP        Hold due to dose change   Quantity:  180 tablet   Refills:  0       * predniSONE 20 MG tablet   Commonly known as:  DELTASONE   This may have changed:  when to take this   Used for:  Heart transplant recipient (H)   Changed by:  Mellisa Suh APRN CNP        Dose:  20 mg   Take 1 tablet (20 mg) by mouth 2 times daily   Quantity:  30 tablet   Refills:  0       * Notice:  This list has 2 medication(s) that are the same as other medications prescribed for you. Read the directions carefully, and ask your doctor or other care provider to review them with you.      Stop taking these  medicines if you haven't already. Please contact your care team if you have questions.     senna-docusate 8.6-50 MG per tablet   Commonly known as:  SENOKOT-S;PERICOLACE   Stopped by:  Mellisa Suh APRN CNP           simethicone 80 MG chewable tablet   Commonly known as:  MYLICON   Stopped by:  Mellisa Suh APRN CNP           terbutaline 5 MG tablet   Commonly known as:  BRETHINE   Stopped by:  Mellisa Suh APRN CNP           valGANciclovir 450 MG tablet   Commonly known as:  VALCYTE   Stopped by:  Mellisa Suh APRN CNP                Where to get your medicines      These medications were sent to [facility] [phone]  [address] [address]    Hours:  TRANSPLANT PHONE NUMBER [phone] Phone:  [phone]     blood glucose monitoring test strip    hydrALAZINE 25 MG tablet    melatonin 1 MG Tabs tablet         Some of these will need a paper prescription and others can be bought over the counter.  Ask your nurse if you have questions.     You don't need a prescription for these medications     predniSONE 20 MG tablet    predniSONE 5 MG tablet                Primary Care Provider Office Phone # Fax #    Yahir Turcios [phone]       [address]  [facility] [address]        Goals        Lifestyle    Increase physical activity     Notes - Note created  7/3/2018  1:51 PM by Carrie Tran RN    Goal Statement: I will start cardiac rehab  Measure of Success: starts cardiac rehab  Supportive Steps to Achieve: support of RN CC  Barriers: none  Strengths: willingness to participate   Date to Achieve By: 7-15-18          Equal Access to Services     [facility] AH: Hadii marsha hernandez Sotracey, waaxda luqadaha, qaybta kaalmada jose crow. So [facility] [phone].    ATENCIÓN: Si habla español, tiene a ortiz disposición servicios gratuitos de asistencia  lingüística. Keely al [phone].    We comply with applicable federal civil rights laws and [place] laws. We do not discriminate on the basis of race, color, national origin, age, disability, sex, sexual orientation, or gender identity.            Thank you!     Thank you for choosing [facility]  for your care. Our goal is always to provide you with excellent care. Hearing back from our patients is one way we can continue to improve our services. Please take a few minutes to complete the written survey that you may receive in the mail after your visit with us. Thank you!             Your Updated Medication List - Protect others around you: Learn how to safely use, store and throw away your medicines at www.disposemymeds.org.          This list is accurate as of 7/20/18 12:35 PM.  Always use your most recent med list.                   Brand Name Dispense Instructions for use Diagnosis    acetaminophen 325 MG tablet    TYLENOL    100 tablet    Take 2 tablets (650 mg) by mouth every 4 hours as needed for mild pain (multimodal surgical pain management along with NSAIDS and opioid medication as indicated based on pain control and physical function.)    Acute post-operative pain       albuterol 108 (90 Base) MCG/ACT Inhaler    PROAIR HFA/PROVENTIL HFA/VENTOLIN HFA    1 Inhaler    Inhale 6 puffs into the lungs every 4 hours as needed for shortness of breath / dyspnea    Dyspnea on exertion       apixaban ANTICOAGULANT 2.5 MG tablet    ELIQUIS    120 tablet    Take 1 tablet (2.5 mg) by mouth 2 times daily    Heart transplanted (H)       aspirin 81 MG tablet      Take 1 tablet (81 mg) by mouth at bedtime        biotin 5 MG Caps    BIOTIN 5000    30 capsule    Take 5 mg by mouth daily    Brittle nails       bismuth subsalicylate 262 MG/15ML suspension    PEPTO BISMOL     Take 15 mLs by mouth every 6 hours as needed for indigestion        blood glucose monitoring lancets     102 each    Use to test blood sugar  2-3 times daily or as directed.  Ok to substitute alternative if insurance prefers.    Type 2 diabetes mellitus with stage 5 chronic kidney disease not on chronic dialysis, without long-term current use of insulin (H)       blood glucose monitoring test strip    no brand specified    100 each    Use to test blood sugar 2-3 times daily or as directed.    Type 2 diabetes mellitus with stage 5 chronic kidney disease not on chronic dialysis, without long-term current use of insulin (H)       fluticasone 50 MCG/ACT spray    FLONASE    16 g    Spray 1-2 sprays into both nostrils daily    Nasal congestion       hydrALAZINE 25 MG tablet    APRESOLINE    90 tablet    Take 1 tablet (25 mg) by mouth 3 times daily Hold if top number BP less than 110.    Essential hypertension, Heart transplanted (H)       * insulin isophane human 100 UNIT/ML injection    HumuLIN N PEN    3 mL    Inject 10 Units Subcutaneous daily (with dinner)    Type 2 diabetes mellitus with stage 5 chronic kidney disease not on chronic dialysis, without long-term current use of insulin (H)       * insulin isophane human 100 UNIT/ML injection    HumuLIN N PEN    3 mL    Inject 26 Units Subcutaneous every morning (before breakfast)    Type 2 diabetes mellitus with stage 5 chronic kidney disease not on chronic dialysis, without long-term current use of insulin (H)       isosorbide dinitrate 10 MG tablet    ISORDIL    120 tablet    Take 1 tablet (10 mg) by mouth 3 times daily    Heart transplanted (H), Hypertension goal BP (blood pressure) < 130/80       loratadine 10 MG tablet    CLARITIN     Take 10 mg by mouth daily as needed Reported on 5/3/2017    Hypertensive cardiopathy, SOB (shortness of breath)       melatonin 1 MG Tabs tablet     120 tablet    Take 2 tablets (2 mg) by mouth nightly as needed for sleep    Heart transplanted (H)       mycophenolate 250 MG capsule    GENERIC EQUIVALENT    180 capsule    Take 3 capsules (750 mg) by mouth 2 times daily     Heart transplanted (H)       NEPHROCAPS 1 MG capsule     120 capsule    Take 1 capsule by mouth daily        nystatin [id number] UNIT/ML suspension    MYCOSTATIN    400 mL    Swish and swallow 10 mLs (1,000,000 Units) in mouth 4 times daily    Heart transplant recipient (H)       order for DME     1 Units    Equipment being ordered: Carol () Treatment Diagnosis: ESRD on PD Pt has to be connected to PD all night and can not be disconnected, hence impending his mobility to go to the bathroom. At risk for infection if he does not have this equipment.    CKD (chronic kidney disease) stage 5, GFR less than 15 ml/min (H)       pantoprazole 40 MG EC tablet    PROTONIX    30 tablet    Take 1 tablet (40 mg) by mouth every morning    Heart transplant recipient (H)       pravastatin 40 MG tablet    PRAVACHOL    90 tablet    Take 1 tablet (40 mg) by mouth daily DC after current prescription completed; replace with rosuvastatin.    Dyslipidemia       * predniSONE 5 MG tablet    DELTASONE    180 tablet    Hold due to dose change    Heart transplant recipient (H)       * predniSONE 20 MG tablet    DELTASONE    30 tablet    Take 1 tablet (20 mg) by mouth 2 times daily    Heart transplant recipient (H)       rosuvastatin 20 MG tablet    CRESTOR    30 tablet    Take 1 tablet (20 mg) by mouth daily    Heart transplant recipient (H)       * tacrolimus 0.5 MG capsule    GENERIC EQUIVALENT    60 capsule    Take one capsule with three 1 mg capsules (3.5 mg) every AM and PM    Heart transplant recipient (H)       * tacrolimus 1 MG capsule    GENERIC EQUIVALENT    180 capsule    Take three capsules with one 0.5 mg capsule (3.5 mg) every AM and PM    Heart transplant recipient (H)       traMADol 50 MG tablet    ULTRAM    10 tablet    Take 1 tablet (50 mg) by mouth every 6 hours as needed for moderate pain    Acute post-operative pain       triamcinolone 0.1 % ointment    KENALOG    80 g    Apply topically 2 times daily    Xerosis of skin        * Notice:  This list has 6 medication(s) that are the same as other medications prescribed for you. Read the directions carefully, and ask your doctor or other care provider to review them with you.

## 2018-07-20 NOTE — NURSING NOTE
Chief Complaint   Patient presents with     Follow Up For     4 weeks post transplant follow up      Vitals were taken and medications were reconciled.     Shreya Faustin,JUDY  11:24 AM
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
## 2018-07-20 NOTE — PATIENT INSTRUCTIONS
Please call your coordinator at [phone] with any questions or concerns.      Discontinue Terbutaline and Valcyte. Will need to check CMV weekly.     Next Pentamidine treatment in ~30 days. Will add to your schedule.     Decrease Hydralazine to 25 mg three times daily. If top number of BP <110; hold that dose.     Lillie will call with biopsy results and medication change.     Recheck labs with level on Monday 8AM  - ok to eat before you come in.     Cont to take precautions against infections with your low WBC     See you next Friday for 6 week follow up.      Keep your med card up to date and bring with you to every clinic visit.

## 2018-07-23 DIAGNOSIS — Z94.1 HEART TRANSPLANT RECIPIENT (H): ICD-10-CM

## 2018-07-23 LAB
ANION GAP SERPL CALCULATED.3IONS-SCNC: 12 MMOL/L (ref 3–14)
ANISOCYTOSIS BLD QL SMEAR: ABNORMAL
BASOPHILS # BLD AUTO: 0 10E9/L (ref 0–0.2)
BASOPHILS NFR BLD AUTO: 0 %
BUN SERPL-MCNC: 68 MG/DL (ref 7–30)
BURR CELLS BLD QL SMEAR: SLIGHT
CALCIUM SERPL-MCNC: 8.6 MG/DL (ref 8.5–10.1)
CHLORIDE SERPL-SCNC: 99 MMOL/L (ref 94–109)
CO2 SERPL-SCNC: 18 MMOL/L (ref 20–32)
CREAT SERPL-MCNC: 7.09 MG/DL (ref 0.66–1.25)
DIFFERENTIAL METHOD BLD: ABNORMAL
EOSINOPHIL # BLD AUTO: 0 10E9/L (ref 0–0.7)
EOSINOPHIL NFR BLD AUTO: 0 %
ERYTHROCYTE [DISTWIDTH] IN BLOOD BY AUTOMATED COUNT: 21 % (ref 10–15)
GFR SERPL CREATININE-BSD FRML MDRD: 8 ML/MIN/1.7M2
GLUCOSE SERPL-MCNC: 285 MG/DL (ref 70–99)
HCT VFR BLD AUTO: 21.2 % (ref 40–53)
HGB BLD-MCNC: 7 G/DL (ref 13.3–17.7)
LYMPHOCYTES # BLD AUTO: 0.1 10E9/L (ref 0.8–5.3)
LYMPHOCYTES NFR BLD AUTO: 6.2 %
MAGNESIUM SERPL-MCNC: 1.7 MG/DL (ref 1.6–2.3)
MCH RBC QN AUTO: 30.6 PG (ref 26.5–33)
MCHC RBC AUTO-ENTMCNC: 33 G/DL (ref 31.5–36.5)
MCV RBC AUTO: 93 FL (ref 78–100)
MONOCYTES # BLD AUTO: 0.1 10E9/L (ref 0–1.3)
MONOCYTES NFR BLD AUTO: 5.3 %
NEUTROPHILS # BLD AUTO: 1.3 10E9/L (ref 1.6–8.3)
NEUTROPHILS NFR BLD AUTO: 88.5 %
PHOSPHATE SERPL-MCNC: 2.5 MG/DL (ref 2.5–4.5)
PLATELET # BLD AUTO: 216 10E9/L (ref 150–450)
PLATELET # BLD EST: ABNORMAL 10*3/UL
POIKILOCYTOSIS BLD QL SMEAR: SLIGHT
POTASSIUM SERPL-SCNC: 5.3 MMOL/L (ref 3.4–5.3)
RBC # BLD AUTO: 2.29 10E12/L (ref 4.4–5.9)
SODIUM SERPL-SCNC: 130 MMOL/L (ref 133–144)
TACROLIMUS BLD-MCNC: 8.6 UG/L (ref 5–15)
TME LAST DOSE: NORMAL H
WBC # BLD AUTO: 1.5 10E9/L (ref 4–11)

## 2018-07-23 PROCEDURE — 80197 ASSAY OF TACROLIMUS: CPT | Performed by: NURSE PRACTITIONER

## 2018-07-24 ENCOUNTER — TELEPHONE (OUTPATIENT)
Dept: TRANSPLANT | Facility: CLINIC | Age: 63
End: 2018-07-24

## 2018-07-24 DIAGNOSIS — Z94.1 HEART TRANSPLANT RECIPIENT (H): ICD-10-CM

## 2018-07-24 RX ORDER — TACROLIMUS 0.5 MG/1
CAPSULE ORAL
Qty: 60 CAPSULE | Refills: 11 | Status: ON HOLD
Start: 2018-07-24 | End: 2018-08-06

## 2018-07-24 RX ORDER — PREDNISONE 20 MG/1
20 TABLET ORAL EVERY MORNING
Qty: 30 TABLET | Refills: 0
Start: 2018-07-24 | End: 2018-07-25

## 2018-07-24 RX ORDER — TACROLIMUS 1 MG/1
CAPSULE ORAL
Qty: 240 CAPSULE | Refills: 11 | Status: ON HOLD | OUTPATIENT
Start: 2018-07-24 | End: 2018-08-06

## 2018-07-24 RX ORDER — PREDNISONE 5 MG/1
TABLET ORAL
Qty: 180 TABLET | Refills: 0
Start: 2018-07-24 | End: 2018-07-25

## 2018-07-24 NOTE — TELEPHONE ENCOUNTER
Pt called with biopsy report (cd4 50%) and lab results including FK level 8.6 (goal 10-12). Instructed to: decrease pred to 20/15 mg (note pt did not decrease pred last two biopsies), increase FK from 3.5 mg BID to 4 mg BID. Discussed rechecking 12 hour trough on Friday 7/27 - timing reviewed in detail.    Confirmed that pt will hold Eliquis on Thursday prior biopsy, see Dr RAPP on Thursday afternoon at 4 PM, labs and labs/biopsy on Friday 7/27. Ok to eat up to ~8AM.     Pt states he still has an achy sore throat, worse in the evening and headache. Confirmed that he is using the Nystatin qid. Temp 97-99.1 - instructed to call if temp >99.9. Confirmed pt had after hours phone number.     Pt not home, stated he wrote everything down in his book. Instructed pt to update med card when he gets home and to bring it to clinic on Thursday afternoon. Pt verbalized understanding.

## 2018-07-25 ENCOUNTER — ALLIED HEALTH/NURSE VISIT (OUTPATIENT)
Dept: PHARMACY | Facility: CLINIC | Age: 63
End: 2018-07-25

## 2018-07-25 DIAGNOSIS — Z94.1 HEART TRANSPLANT RECIPIENT (H): ICD-10-CM

## 2018-07-25 DIAGNOSIS — N18.5 TYPE 2 DIABETES MELLITUS WITH STAGE 5 CHRONIC KIDNEY DISEASE NOT ON CHRONIC DIALYSIS, WITHOUT LONG-TERM CURRENT USE OF INSULIN (H): Primary | ICD-10-CM

## 2018-07-25 DIAGNOSIS — Z94.1 HEART TRANSPLANTED (H): ICD-10-CM

## 2018-07-25 DIAGNOSIS — E78.5 HYPERLIPIDEMIA LDL GOAL <100: ICD-10-CM

## 2018-07-25 DIAGNOSIS — I10 HYPERTENSION GOAL BP (BLOOD PRESSURE) < 130/80: ICD-10-CM

## 2018-07-25 DIAGNOSIS — R09.81 NASAL CONGESTION: ICD-10-CM

## 2018-07-25 DIAGNOSIS — E11.22 TYPE 2 DIABETES MELLITUS WITH STAGE 5 CHRONIC KIDNEY DISEASE NOT ON CHRONIC DIALYSIS, WITHOUT LONG-TERM CURRENT USE OF INSULIN (H): Primary | ICD-10-CM

## 2018-07-25 PROCEDURE — 99606 MTMS BY PHARM EST 15 MIN: CPT | Performed by: PHARMACIST

## 2018-07-25 PROCEDURE — 99607 MTMS BY PHARM ADDL 15 MIN: CPT | Performed by: PHARMACIST

## 2018-07-25 RX ORDER — ISOSORBIDE DINITRATE 10 MG/1
10 TABLET ORAL 3 TIMES DAILY
Qty: 90 TABLET | Refills: 11 | Status: ON HOLD | OUTPATIENT
Start: 2018-07-25 | End: 2018-08-06

## 2018-07-25 RX ORDER — PANTOPRAZOLE SODIUM 40 MG/1
40 TABLET, DELAYED RELEASE ORAL EVERY MORNING
Qty: 30 TABLET | Refills: 11 | Status: ON HOLD | OUTPATIENT
Start: 2018-07-25 | End: 2018-09-26

## 2018-07-25 RX ORDER — PREDNISONE 20 MG/1
20 TABLET ORAL EVERY MORNING
Qty: 30 TABLET | Refills: 0 | Status: ON HOLD | OUTPATIENT
Start: 2018-07-25 | End: 2018-08-06

## 2018-07-25 RX ORDER — PREDNISONE 5 MG/1
TABLET ORAL
Qty: 180 TABLET | Refills: 0 | Status: ON HOLD | OUTPATIENT
Start: 2018-07-25 | End: 2018-08-06

## 2018-07-25 RX ORDER — FLUTICASONE PROPIONATE 50 MCG
1-2 SPRAY, SUSPENSION (ML) NASAL DAILY
Qty: 16 G | Refills: 11 | Status: ON HOLD | OUTPATIENT
Start: 2018-07-25 | End: 2019-06-07

## 2018-07-25 NOTE — PATIENT INSTRUCTIONS
Recommendations from today's MTM visit:                                                      1. I will talk with the transplant team about trying Atorvastatin (Lipitor) instead of Crestor as it should be cheaper for you. .     2. Remember, use your Insulin NPH scale for permanent dose adjustments, not just for one night. See below for the dose adjustment scale. We will go through your blood sugars in depth at our next visit.       If your fasting glucose is less than 100, please reduce your night time NPH by 2-4 units    If your pre-dinner glucose is less than 100, please reduce your morning NPH by 2-4 units    If your fasting glucose is  greater than 200  for 2 or more days, please increase your night time NPH by 2-4 units    Next MTM visit: 8/17 at 10:30am in Clinic [facility] at the [facility] [place]     To schedule another MTM appointment, please call the clinic directly or you may call the MTM scheduling line at [phone] or toll-free at [phone].     My Clinical Pharmacist's contact information:                                                      It was a pleasure seeing you today!  Please feel free to contact me with any questions or concerns you have.      Eduardo Lea, PharmD  MTM Pharmacist    Phone: [phone]     You may receive a survey about the MTM services you received.  I would appreciate your feedback to help me serve you better in the future. Please fill it out and return it when you can. Your comments will be anonymous.

## 2018-07-25 NOTE — PROGRESS NOTES
SUBJECTIVE/OBJECTIVE:                           Murray Nicholson is a 63 year old male called for an follow up visit for Medication Therapy Management.  He was referred to me from Txp team. Discharged from the [facility] for heart txp since 7/2/18. Recent heart biopsy on 7/18.     Chief Complaint: Holding Bactrim and Valcyte been doing this at least a week. Pt did not make the Prednisone dose adjustment until yesterday, is now taking correct dose.     Allergies/ADRs: Reviewed in Epic  Tobacco: No tobacco use  Alcohol: not currently using  Caffeine: 1 cups/day of coffee  Activity: been going out and about with his friends.   PMH: Reviewed in Epic    Medication Adherence/Access:  Patient uses pill box(es). Has started using med chart, has been keeping it updated.   Patient takes medications 4 time(s) per day.   Per patient, misses medication 0 times per week.  He has missed NPH in the past week. Likely leading to his >400 bg.     Heart Transplant:  Current immunosuppressants include TAC 3mg mg qAM & 2.5mg qPM, Prednisone 20mg qAM & 15mg qPM and MMF 750mg BIDBID. Pt complains of soft/ loose stools.   Transplant date: 6/14/18  Estimated Creatinine Clearance: 11.8 mL/min (based on Cr of 7.09).  CMV prophylaxis: using Valcyte holding due to neutropenia.   PCP prophylaxis: Bactrim holding due to neutropenia. Receiving Pentamidine inhalations monthly.   Antifungal Prophylaxis: Nystatin until off steroids. QID.   PPI use: Pantoprazole 40mg qAM.  Current supplements for electrolyte replacement:  Nephrocaps  Tx Coordinator: Bob Ulloa MD: Klever, Using Med Card: Yes  Recent Infections:  Concerns about a post OP infection. Pt is getting Vancomycin IV after Dialysis sessions.   Recent Hospitalizations: recent txp  Home BPs: systolic 99, 124, 116, 123, 150. Diastolic:126/73, 150/91 (dialysis), 116/65, 156/89, 136/76,     Diabetes:  Pt currently taking NPH  30 units qAM, 14 units qevening.  Increased NPH last night, has been using the  guidelines below as a sliding scale rather than a permanent adjustment.  For adjusting your NPH use the following guidelines:    If your fasting glucose is less than 100, please reduce your night time NPH by 2-4 units    If your pre-dinner glucose is less than 100, please reduce your morning NPH by 2-4 units    If your fasting glucose is  greater than 200  for 2 or more days, please increase your night time NPH by 2-4 units  If your pre-dinner glucose is greater than 200 for 2 or more days, please increase your morning NPH by 2-4 units Pt is not experiencing side effects.  SMBG: two times daily.   Ranges (patient reported):   Date FBG/ 2hours post Dinner /2hours post   7/24 271 213   7/23 265 460 (may have missed dose on this day)   7/22 203 204   7/21 54 (shaky) 230   7/20 383 157   7/19 151 226     Date FBG/ 2hours post Dinner /2hours post   7/6 (increased NPH) 227    7/5 244 312   7/4 195 306   7/3 152 199   7/2 Discharged from hospital.  173     Patient is not experiencing hypoglycemia  Recent symptoms of high blood sugar? none  Diet/Exercise: Spinach, potato, rotisserie chicken, Carrots dinner. Low sodium    Hyperlipidemia: Current therapy includes Pravastatin 40mg daily, changed to crestor but it was a co pay of 100$. Has tolerated Atorvastatin in the past.   Pt reports no significant myalgias or other side effects.  The 10-year ASCVD risk score ([place] AZIZA Jr, et al., 2013) is: 17.9%    Values used to calculate the score:      Age: 63 years      Sex: Male      Is Non- : Yes      Diabetic: Yes      Tobacco smoker: No      Systolic Blood Pressure: 114 mmHg      Is BP treated: Yes      HDL Cholesterol: 92 mg/dL      Total Cholesterol: 175 mg/dL    Current labs include:  Today's Vitals: There were no vitals taken for this visit.  BP Readings from Last 3 Encounters:   07/20/18 114/69   07/20/18 139/81   07/18/18 125/66     Lab Results   Component Value Date    [facility] 136 05/17/2017    MICROL  1160 05/19/2015    ALCR 1266.38 (H) 05/19/2015     GFR Estimate   Date Value Ref Range Status   07/23/2018 8 (L) >60 mL/min/1.7m2 Final     Comment:     Non  GFR Calc   07/20/2018 14 (L) >60 mL/min/1.7m2 Final     Comment:     Non  GFR Calc   07/18/2018 10 (L) >60 mL/min/1.7m2 Final     Comment:     Non  GFR Calc     GFR Estimate If Black   Date Value Ref Range Status   07/23/2018 10 (L) >60 mL/min/1.7m2 Final     Comment:      GFR Calc   07/20/2018 17 (L) >60 mL/min/1.7m2 Final     Comment:      GFR Calc   07/18/2018 13 (L) >60 mL/min/1.7m2 Final     Comment:      GFR Calc     Most Recent Immunizations   Administered Date(s) Administered     HEPA 02/09/2010     HepB 04/05/2016     Influenza (H1N1) 02/09/2010     Influenza (IIV3) PF 11/26/2012     Influenza Vaccine IM 3yrs+ 4 Valent IIV4 11/07/2017     Mantoux Tuberculin Skin Test 01/23/2018     Pneumo Conj 13-V (2010&after) 09/23/2015     Pneumococcal 23 valent 02/23/2011     TD (ADULT, 7+) 08/12/2004     TDAP Vaccine (Adacel) 02/28/2013     Zoster vaccine, live 04/09/2015       ASSESSMENT:                             Current medications were reviewed today. Confirmed patient is holding Bactrim     Medication Adherence: good, no issues identified    Heart Transplant:  Stable.     Diabetes: Discussed correct use of NPH dose  adjustment scale.  Will follow up with patient and see if his blood sugars are lower.  He is to keep adjusting NPH until his blood sugars are between 102 100 per the scale.    Hyperlipidemia: Needs improvement.  Patient's co-pay for Crestor is $100 he would like an alternative option.  Atorvastatin can also reach high intensity and does not have to be renally dosed.  I will discuss with transplant team.      PLAN:                            Txp Team...  1.  Consider atorvastatin 40 mg instead of Crestor.  Does not have to be renally dosed and will  be less expensive for patient.  2. CARMEN Pt has been holding Bactrim and Valcyte for the past week, and is on correct doses of immunosuppressants.     Patient to   Continue adjusting NPH insulin.     I spent 30 minutes with this patient today. I offer these suggestions for consideration by the txp team. A copy of the visit note was provided to the patient's txp care provider.    Will follow up in 4 weeks.     The patient was sent via [employer] a summary of these recommendations as an after visit summary.     Eduardo Lea, PharmD  [facility] Pharmacist    Phone: [phone]

## 2018-07-25 NOTE — MR AVS SNAPSHOT
After Visit Summary   7/25/2018    Murray Nicholson    MRN: [id number]           Patient Information     Date Of Birth          1955        Visit Information        Provider Department      7/25/2018 9:00 AM [facility]        Today's Diagnoses     Type 2 diabetes mellitus with stage 5 chronic kidney disease not on chronic dialysis, without long-term current use of insulin (H)    -  1    Heart transplanted (H)        Hyperlipidemia LDL goal <100          Care Instructions    Recommendations from today's MTM visit:                                                      1. I will talk with the transplant team about trying Atorvastatin (Lipitor) instead of Crestor as it should be cheaper for you. .     2. Remember, use your Insulin NPH scale for permanent dose adjustments, not just for one night. See below for the dose adjustment scale. We will go through your blood sugars in depth at our next visit.       If your fasting glucose is less than 100, please reduce your night time NPH by 2-4 units    If your pre-dinner glucose is less than 100, please reduce your morning NPH by 2-4 units    If your fasting glucose is  greater than 200  for 2 or more days, please increase your night time NPH by 2-4 units    Next MTM visit: 8/17 at 10:30am in Clinic 3a at the [facility].     To schedule another MTM appointment, please call the clinic directly or you may call the MTM scheduling line at [phone] or toll-free at [phone].     My Clinical Pharmacist's contact information:                                                      It was a pleasure seeing you today!  Please feel free to contact me with any questions or concerns you have.      Eduardo Lea, PharmD  MTM Pharmacist    Phone: [phone]     You may receive a survey about the MTM services you received.  I would appreciate your feedback to help me serve you better in the future. Please fill it out and  return it when you can. Your comments will be anonymous.              Follow-ups after your visit        Your next 10 appointments already scheduled     Jul 26, 2018  4:00 PM CDT   (Arrive by 3:45 PM)   RETURN HEART TRANSPLANT with Klever Ernst MD   [facility] ([facility])    [address]  [address]  [place] [address]   [phone]            Jul 27, 2018  8:30 AM CDT   Lab with LOS LAB    Health Lab ([facility])    [address]  1st Floor  [place] [address]   [phone]            Jul 27, 2018 11:00 AM CDT   Procedure - 2.5 hour with U2A ROOM 17   [address] [facility] ([facility])    [address] [address]               Jul 27, 2018 12:00 PM CDT   Heart Cath Heart Biopsy with UUHCVR4   [facility]    [address] [address]   [phone]            Aug 10, 2018 10:00 AM CDT   LAB with UU LAB GOLD WAITING   [facility]    [address] [address]              Please do not eat 10-12 hours before your appointment if you are coming in fasting for labs on lipids, cholesterol, or glucose (sugar). This does not apply to pregnant women. Water, hot tea and black coffee (with nothing added) are okay. Do not drink other fluids, diet soda or chew gum.            Aug 10, 2018 10:30 AM CDT   Procedure - 2.5 hour with U2A ROOM 15   [address] [facility] ([facility])    [address] [address]               Aug 10, 2018 11:30 AM CDT   Heart Cath Heart Biopsy with UUHCVR3   [facility]    [address] [address]   [phone]            Aug 10, 2018   2:30 PM CDT   (Arrive by 2:15 PM)   RETURN HEART TRANSPLANT with VERONICA Rocha CNP   [facility] ([facility])    [address]  [address]  [place] [address] [phone]            Aug 17, 2018  9:30 AM CDT   (Arrive by 9:15 AM)   RETURN WAIT LIST with Janine Trujillo PA-C   [facility] Solid Organ Transplant ([facility])    [address]  [address]  [place] [id number] [phone]            Aug 17, 2018 11:30 AM CDT   US AORTA/IVC/ILIAC DUPLEX COMPLETE with UCUSV2   [facility] US ([facility])    [address]  1st [address]  [place] [address] [phone]           Please bring a list of your medicines (including vitamins, minerals and over-the-counter drugs). Also, tell your doctor about any allergies you may have. Wear comfortable clothes and leave your valuables at home.  Adults: No eating or drinking for 8 hours before the exam. You may take medicine with a small sip of water.  Children: - Infants, breast-fed: may have breast milk up to 2 hours before exam. - Infants, formula: may have bottle until 4 hours before exam. - Children 1-5 years: No food or drink for 4 hours before exam. - Children 6 -12 years: No food or drink for 6 hours before exam. - Children over 12 years: No food or drink for 8 hours before exam. - J Tube Fed: No need to stop feedings.  Please call the Imaging Department at your exam site with any questions.              Who to contact     If you have questions or need follow up information about today's clinic visit or your schedule please contact [facility] MEDICATION THERAPY MANAGEMENT directly at [phone].  Normal or non-critical lab and imaging results will be communicated to you by MyChart, letter or phone within 4 business days after the clinic has received the results. If you do not hear from us within 7 days, please contact the  clinic through [employer] or phone. If you have a critical or abnormal lab result, we will notify you by phone as soon as possible.  Submit refill requests through [employer] or call your pharmacy and they will forward the refill request to us. Please allow 3 business days for your refill to be completed.          Additional Information About Your Visit        Ernie'shar[employer]unch Information     [employer] gives you secure access to your electronic health record. If you see a primary care provider, you can also send messages to your care team and make appointments. If you have questions, please call your primary care clinic.  If you do not have a primary care provider, please call [phone] and they will assist you.        Care EveryWhere ID     This is your Care EveryWhere ID. This could be used by other organizations to access your [place] medical records  [id number]         Blood Pressure from Last 3 Encounters:   07/20/18 114/69   07/20/18 139/81   07/18/18 125/66    Weight from Last 3 Encounters:   07/20/18 172 lb 1.6 oz (78.1 kg)   07/10/18 176 lb 1.6 oz (79.9 kg)   07/06/18 177 lb (80.3 kg)              Today, you had the following     No orders found for display         Today's Medication Changes          These changes are accurate as of 7/25/18 11:01 AM.  If you have any questions, ask your nurse or doctor.               Stop taking these medicines if you haven't already. Please contact your care team if you have questions.     bismuth subsalicylate 262 MG/15ML suspension   Commonly known as:  PEPTO BISMOL   Stopped by:  Eduardo Lea, [facility]                Where to get your medicines      These medications were sent to [facility] - [place], [place] - [address] [place] [address] AVE   [address] [facility] [address]    Hours:  Mon-Fri 8:30am-5:00pm Toll Free [phone] Phone:  [phone]     fluticasone 50 MCG/ACT spray    isosorbide dinitrate 10 MG tablet    pantoprazole 40 MG EC  tablet    predniSONE 20 MG tablet    predniSONE 5 MG tablet                Primary Care Provider Office Phone # Fax #    Yahir Turcios [phone]       [address]  [facility] [address]        Goals        Lifestyle    Increase physical activity     Notes - Note created  7/3/2018  1:51 PM by Carrie Tran RN    Goal Statement: I will start cardiac rehab  Measure of Success: starts cardiac rehab  Supportive Steps to Achieve: support of RN CC  Barriers: none  Strengths: willingness to participate   Date to Achieve By: 7-15-18          Equal Access to Services     [facility]: Hadii aad ku hadasho Soomaali, waaxda luqadaha, qaybta kaalmada adeegyada, waxay idiin hayaan adeeg kharash laRachaelaan . So [facility] [phone].    ATENCIÓN: Si habla español, tiene a ortiz disposición servicios gratuitos de asistencia lingüística. [facility] [phone].    We comply with applicable federal civil rights laws and [place] laws. We do not discriminate on the basis of race, color, national origin, age, disability, sex, sexual orientation, or gender identity.            Thank you!     Thank you for choosing [facility] MEDICATION THERAPY MANAGEMENT  for your care. Our goal is always to provide you with excellent care. Hearing back from our patients is one way we can continue to improve our services. Please take a few minutes to complete the written survey that you may receive in the mail after your visit with us. Thank you!             Your Updated Medication List - Protect others around you: Learn how to safely use, store and throw away your medicines at www.disposemymeds.org.          This list is accurate as of 7/25/18 11:01 AM.  Always use your most recent med list.                   Brand Name Dispense Instructions for use Diagnosis    acetaminophen 325 MG tablet    TYLENOL    100 tablet    Take 2 tablets (650 mg) by mouth every 4 hours as needed for mild pain (multimodal surgical pain management along with  NSAIDS and opioid medication as indicated based on pain control and physical function.)    Acute post-operative pain       albuterol 108 (90 Base) MCG/ACT Inhaler    PROAIR HFA/PROVENTIL HFA/VENTOLIN HFA    1 Inhaler    Inhale 6 puffs into the lungs every 4 hours as needed for shortness of breath / dyspnea    Dyspnea on exertion       apixaban ANTICOAGULANT 2.5 MG tablet    ELIQUIS    120 tablet    Take 1 tablet (2.5 mg) by mouth 2 times daily    Heart transplanted (H)       aspirin 81 MG tablet      Take 1 tablet (81 mg) by mouth at bedtime        biotin 5 MG Caps    BIOTIN 5000    30 capsule    Take 5 mg by mouth daily    Brittle nails       blood glucose monitoring lancets     102 each    Use to test blood sugar 2-3 times daily or as directed.  Ok to substitute alternative if insurance prefers.    Type 2 diabetes mellitus with stage 5 chronic kidney disease not on chronic dialysis, without long-term current use of insulin (H)       blood glucose monitoring test strip    no brand specified    100 each    Use to test blood sugar 2-3 times daily or as directed.    Type 2 diabetes mellitus with stage 5 chronic kidney disease not on chronic dialysis, without long-term current use of insulin (H)       fluticasone 50 MCG/ACT spray    FLONASE    16 g    Spray 1-2 sprays into both nostrils daily    Nasal congestion       hydrALAZINE 25 MG tablet    APRESOLINE    90 tablet    Take 1 tablet (25 mg) by mouth 3 times daily Hold if top number BP less than 110.    Essential hypertension, Heart transplanted (H)       * insulin isophane human 100 UNIT/ML injection    HumuLIN N PEN    3 mL    Inject 10 Units Subcutaneous daily (with dinner)    Type 2 diabetes mellitus with stage 5 chronic kidney disease not on chronic dialysis, without long-term current use of insulin (H)       * insulin isophane human 100 UNIT/ML injection    HumuLIN N PEN    3 mL    Inject 26 Units Subcutaneous every morning (before breakfast)    Type 2 diabetes  mellitus with stage 5 chronic kidney disease not on chronic dialysis, without long-term current use of insulin (H)       isosorbide dinitrate 10 MG tablet    ISORDIL    90 tablet    Take 1 tablet (10 mg) by mouth 3 times daily    Heart transplanted (H), Hypertension goal BP (blood pressure) < 130/80       loratadine 10 MG tablet    CLARITIN     Take 10 mg by mouth daily as needed Reported on 5/3/2017    Hypertensive cardiopathy, SOB (shortness of breath)       melatonin 1 MG Tabs tablet     120 tablet    Take 2 tablets (2 mg) by mouth nightly as needed for sleep    Heart transplanted (H)       mycophenolate 250 MG capsule    GENERIC EQUIVALENT    180 capsule    Take 3 capsules (750 mg) by mouth 2 times daily    Heart transplanted (H)       NEPHROCAPS 1 MG capsule     120 capsule    Take 1 capsule by mouth daily        nystatin 542193 UNIT/ML suspension    MYCOSTATIN    400 mL    Swish and swallow 10 mLs (1,000,000 Units) in mouth 4 times daily    Heart transplant recipient (H)       order for DME     1 Units    Equipment being ordered: Commode () Treatment Diagnosis: ESRD on PD Pt has to be connected to PD all night and can not be disconnected, hence impending his mobility to go to the bathroom. At risk for infection if he does not have this equipment.    CKD (chronic kidney disease) stage 5, GFR less than 15 ml/min (H)       pantoprazole 40 MG EC tablet    PROTONIX    30 tablet    Take 1 tablet (40 mg) by mouth every morning    Heart transplant recipient (H)       pravastatin 40 MG tablet    PRAVACHOL    90 tablet    Take 1 tablet (40 mg) by mouth daily DC after current prescription completed; replace with rosuvastatin.    Dyslipidemia       * predniSONE 20 MG tablet    DELTASONE    30 tablet    Take 1 tablet (20 mg) by mouth every morning    Heart transplant recipient (H)       * predniSONE 5 MG tablet    DELTASONE    180 tablet    Take three tablets (15 mg) every PM    Heart transplant recipient (H)        rosuvastatin 20 MG tablet    CRESTOR    30 tablet    Take 1 tablet (20 mg) by mouth daily    Heart transplant recipient (H)       * tacrolimus 0.5 MG capsule    GENERIC EQUIVALENT    60 capsule    On HOLD due to dose change    Heart transplant recipient (H)       * tacrolimus 1 MG capsule    GENERIC EQUIVALENT    240 capsule    Take four capsules (4 mg) every AM and PM    Heart transplant recipient (H)       traMADol 50 MG tablet    ULTRAM    10 tablet    Take 1 tablet (50 mg) by mouth every 6 hours as needed for moderate pain    Acute post-operative pain       triamcinolone 0.1 % ointment    KENALOG    80 g    Apply topically 2 times daily    Xerosis of skin       * Notice:  This list has 6 medication(s) that are the same as other medications prescribed for you. Read the directions carefully, and ask your doctor or other care provider to review them with you.

## 2018-07-25 NOTE — Clinical Note
"FYI, pt is taking all the correct immunos and has been of bactrim/ valcyte for \"at least a week\" due to neutropenia.   Crestor copay is 100 for patient, I recommend switching to Atorvastatin 40mg daily as this will be cheaper, is a high intensity statin, and does not have to be renally dosed.   Let me know what you decide!  Eduardo Lea, PharmD [facility] Pharmacist  Phone: [phone] "

## 2018-07-25 NOTE — TELEPHONE ENCOUNTER
Prednisone 5mg  Last FIlled Date 7/2/18  Qty dispensed 90    Prednisone 20mg  Last Filled 7/2/18  Qty dispensed 30    Thank you very kindly!  [facility]

## 2018-07-26 ENCOUNTER — APPOINTMENT (OUTPATIENT)
Dept: CT IMAGING | Facility: CLINIC | Age: 63
DRG: 871 | End: 2018-07-26
Payer: MEDICARE

## 2018-07-26 ENCOUNTER — HOSPITAL ENCOUNTER (INPATIENT)
Facility: CLINIC | Age: 63
LOS: 11 days | Discharge: HOME | DRG: 871 | End: 2018-08-06
Attending: EMERGENCY MEDICINE | Admitting: INTERNAL MEDICINE
Payer: MEDICARE

## 2018-07-26 ENCOUNTER — APPOINTMENT (OUTPATIENT)
Dept: GENERAL RADIOLOGY | Facility: CLINIC | Age: 63
DRG: 871 | End: 2018-07-26
Attending: EMERGENCY MEDICINE
Payer: MEDICARE

## 2018-07-26 ENCOUNTER — OFFICE VISIT (OUTPATIENT)
Dept: CARDIOLOGY | Facility: CLINIC | Age: 63
DRG: 871 | End: 2018-07-26
Attending: INTERNAL MEDICINE
Payer: MEDICARE

## 2018-07-26 ENCOUNTER — TRANSFERRED RECORDS (OUTPATIENT)
Dept: HEALTH INFORMATION MANAGEMENT | Facility: CLINIC | Age: 63
End: 2018-07-26

## 2018-07-26 VITALS
BODY MASS INDEX: 25.37 KG/M2 | DIASTOLIC BLOOD PRESSURE: 61 MMHG | HEART RATE: 64 BPM | OXYGEN SATURATION: 100 % | TEMPERATURE: 98.5 F | WEIGHT: 171.3 LBS | HEIGHT: 69 IN | SYSTOLIC BLOOD PRESSURE: 101 MMHG

## 2018-07-26 DIAGNOSIS — Z94.1 HEART TRANSPLANT RECIPIENT (H): Primary | ICD-10-CM

## 2018-07-26 DIAGNOSIS — E11.9 DIABETES MELLITUS (H): ICD-10-CM

## 2018-07-26 DIAGNOSIS — A41.89 SEPSIS DUE TO OTHER ETIOLOGY (H): ICD-10-CM

## 2018-07-26 DIAGNOSIS — Z94.1 HEART TRANSPLANT RECIPIENT (H): ICD-10-CM

## 2018-07-26 DIAGNOSIS — N18.5 TYPE 2 DIABETES MELLITUS WITH STAGE 5 CHRONIC KIDNEY DISEASE NOT ON CHRONIC DIALYSIS, WITHOUT LONG-TERM CURRENT USE OF INSULIN (H): ICD-10-CM

## 2018-07-26 DIAGNOSIS — R50.81 NEUTROPENIC FEVER (H): ICD-10-CM

## 2018-07-26 DIAGNOSIS — I10 ESSENTIAL HYPERTENSION: ICD-10-CM

## 2018-07-26 DIAGNOSIS — Z94.1 HEART REPLACED BY TRANSPLANT (H): ICD-10-CM

## 2018-07-26 DIAGNOSIS — K13.79 MOUTH SORE: ICD-10-CM

## 2018-07-26 DIAGNOSIS — Z87.891 PERSONAL HISTORY OF TOBACCO USE, PRESENTING HAZARDS TO HEALTH: ICD-10-CM

## 2018-07-26 DIAGNOSIS — D70.9 NEUTROPENIC FEVER (H): ICD-10-CM

## 2018-07-26 DIAGNOSIS — Z94.1 HEART TRANSPLANTED (H): ICD-10-CM

## 2018-07-26 DIAGNOSIS — E11.22 TYPE 2 DIABETES MELLITUS WITH STAGE 5 CHRONIC KIDNEY DISEASE NOT ON CHRONIC DIALYSIS, WITHOUT LONG-TERM CURRENT USE OF INSULIN (H): ICD-10-CM

## 2018-07-26 PROBLEM — Z98.890 STATUS POST CORONARY ANGIOGRAM: Status: RESOLVED | Noted: 2018-07-18 | Resolved: 2018-07-26

## 2018-07-26 PROBLEM — I50.22 CHRONIC SYSTOLIC HEART FAILURE (H): Status: RESOLVED | Noted: 2017-07-25 | Resolved: 2018-07-26

## 2018-07-26 PROBLEM — E87.70 FLUID OVERLOAD: Status: RESOLVED | Noted: 2017-04-08 | Resolved: 2018-07-26

## 2018-07-26 PROBLEM — K65.9 PERITONITIS (H): Status: RESOLVED | Noted: 2018-01-07 | Resolved: 2018-07-26

## 2018-07-26 PROBLEM — Z51.81 ENCOUNTER FOR THERAPEUTIC DRUG MONITORING: Status: RESOLVED | Noted: 2018-04-06 | Resolved: 2018-07-26

## 2018-07-26 PROBLEM — R50.9 FEVER: Status: ACTIVE | Noted: 2018-07-26

## 2018-07-26 PROBLEM — I50.9 HEART FAILURE (H): Status: RESOLVED | Noted: 2018-02-02 | Resolved: 2018-07-26

## 2018-07-26 PROBLEM — E87.70 VOLUME OVERLOAD: Status: RESOLVED | Noted: 2017-11-13 | Resolved: 2018-07-26

## 2018-07-26 LAB
ALBUMIN SERPL-MCNC: 2.3 G/DL (ref 3.4–5)
ALP SERPL-CCNC: 95 U/L (ref 40–150)
ALT SERPL W P-5'-P-CCNC: 19 U/L (ref 0–70)
ANION GAP SERPL CALCULATED.3IONS-SCNC: 9 MMOL/L (ref 3–14)
AST SERPL W P-5'-P-CCNC: 18 U/L (ref 0–45)
BILIRUB SERPL-MCNC: 0.7 MG/DL (ref 0.2–1.3)
BUN SERPL-MCNC: 22 MG/DL (ref 7–30)
CALCIUM SERPL-MCNC: 8.4 MG/DL (ref 8.5–10.1)
CHLORIDE SERPL-SCNC: 94 MMOL/L (ref 94–109)
CO2 BLDCOV-SCNC: 27 MMOL/L (ref 21–28)
CO2 BLDCOV-SCNC: 30 MMOL/L (ref 21–28)
CO2 SERPL-SCNC: 26 MMOL/L (ref 20–32)
CREAT SERPL-MCNC: 2.96 MG/DL (ref 0.66–1.25)
CRP SERPL-MCNC: 270 MG/L (ref 0–8)
CRYPTOC AG SPEC QL: NORMAL
GFR SERPL CREATININE-BSD FRML MDRD: 22 ML/MIN/1.7M2
GLUCOSE SERPL-MCNC: 158 MG/DL (ref 70–99)
GRAM STN SPEC: ABNORMAL
HSV1 DNA SPEC QL NAA+PROBE: ABNORMAL
HSV2 DNA SPEC QL NAA+PROBE: ABNORMAL
INR PPP: 1.24 (ref 0.86–1.14)
LACTATE BLD-SCNC: 0.4 MMOL/L (ref 0.7–2.1)
LACTATE BLD-SCNC: 3.2 MMOL/L (ref 0.7–2.1)
NT-PROBNP SERPL-MCNC: ABNORMAL PG/ML (ref 0–900)
PCO2 BLDV: 33 MM HG (ref 40–50)
PCO2 BLDV: 41 MM HG (ref 40–50)
PH BLDV: 7.46 PH (ref 7.32–7.43)
PH BLDV: 7.53 PH (ref 7.32–7.43)
PO2 BLDV: 17 MM HG (ref 25–47)
PO2 BLDV: 44 MM HG (ref 25–47)
POTASSIUM SERPL-SCNC: 4.4 MMOL/L (ref 3.4–5.3)
PROT SERPL-MCNC: 6.1 G/DL (ref 6.8–8.8)
SAO2 % BLDV FROM PO2: 26 %
SAO2 % BLDV FROM PO2: 85 %
SODIUM SERPL-SCNC: 129 MMOL/L (ref 133–144)
SPECIMEN SOURCE: ABNORMAL
SPECIMEN SOURCE: ABNORMAL
SPECIMEN SOURCE: NORMAL
SPECIMEN SOURCE: NORMAL
TROPONIN I SERPL-MCNC: 0.03 UG/L (ref 0–0.04)
VZV DNA SPEC QL NAA+PROBE: NORMAL

## 2018-07-26 PROCEDURE — 25000128 H RX IP 250 OP 636: Performed by: STUDENT IN AN ORGANIZED HEALTH CARE EDUCATION/TRAINING PROGRAM

## 2018-07-26 PROCEDURE — 12000006 ZZH R&B IMCU INTERMEDIATE UMMC

## 2018-07-26 PROCEDURE — 93005 ELECTROCARDIOGRAM TRACING: CPT | Performed by: EMERGENCY MEDICINE

## 2018-07-26 PROCEDURE — 87205 SMEAR GRAM STAIN: CPT | Performed by: EMERGENCY MEDICINE

## 2018-07-26 PROCEDURE — 85025 COMPLETE CBC W/AUTO DIFF WBC: CPT | Performed by: EMERGENCY MEDICINE

## 2018-07-26 PROCEDURE — 83605 ASSAY OF LACTIC ACID: CPT

## 2018-07-26 PROCEDURE — 85610 PROTHROMBIN TIME: CPT | Performed by: EMERGENCY MEDICINE

## 2018-07-26 PROCEDURE — 87899 AGENT NOS ASSAY W/OPTIC: CPT | Performed by: EMERGENCY MEDICINE

## 2018-07-26 PROCEDURE — 25000125 ZZHC RX 250: Performed by: STUDENT IN AN ORGANIZED HEALTH CARE EDUCATION/TRAINING PROGRAM

## 2018-07-26 PROCEDURE — 87529 HSV DNA AMP PROBE: CPT | Performed by: EMERGENCY MEDICINE

## 2018-07-26 PROCEDURE — 87070 CULTURE OTHR SPECIMN AEROBIC: CPT | Performed by: EMERGENCY MEDICINE

## 2018-07-26 PROCEDURE — 96365 THER/PROPH/DIAG IV INF INIT: CPT | Performed by: EMERGENCY MEDICINE

## 2018-07-26 PROCEDURE — 99285 EMERGENCY DEPT VISIT HI MDM: CPT | Mod: 25 | Performed by: EMERGENCY MEDICINE

## 2018-07-26 PROCEDURE — 82803 BLOOD GASES ANY COMBINATION: CPT

## 2018-07-26 PROCEDURE — 84484 ASSAY OF TROPONIN QUANT: CPT | Performed by: EMERGENCY MEDICINE

## 2018-07-26 PROCEDURE — 87799 DETECT AGENT NOS DNA QUANT: CPT | Performed by: EMERGENCY MEDICINE

## 2018-07-26 PROCEDURE — 99215 OFFICE O/P EST HI 40 MIN: CPT | Mod: ZP | Performed by: INTERNAL MEDICINE

## 2018-07-26 PROCEDURE — 96367 TX/PROPH/DG ADDL SEQ IV INF: CPT | Performed by: EMERGENCY MEDICINE

## 2018-07-26 PROCEDURE — 87077 CULTURE AEROBIC IDENTIFY: CPT | Performed by: EMERGENCY MEDICINE

## 2018-07-26 PROCEDURE — 86140 C-REACTIVE PROTEIN: CPT | Performed by: EMERGENCY MEDICINE

## 2018-07-26 PROCEDURE — 87040 BLOOD CULTURE FOR BACTERIA: CPT | Performed by: EMERGENCY MEDICINE

## 2018-07-26 PROCEDURE — 87186 SC STD MICRODIL/AGAR DIL: CPT | Performed by: EMERGENCY MEDICINE

## 2018-07-26 PROCEDURE — 96366 THER/PROPH/DIAG IV INF ADDON: CPT | Performed by: EMERGENCY MEDICINE

## 2018-07-26 PROCEDURE — 80053 COMPREHEN METABOLIC PANEL: CPT | Performed by: EMERGENCY MEDICINE

## 2018-07-26 PROCEDURE — A9270 NON-COVERED ITEM OR SERVICE: HCPCS | Mod: GY | Performed by: EMERGENCY MEDICINE

## 2018-07-26 PROCEDURE — 25000132 ZZH RX MED GY IP 250 OP 250 PS 637: Mod: GY | Performed by: EMERGENCY MEDICINE

## 2018-07-26 PROCEDURE — 99223 1ST HOSP IP/OBS HIGH 75: CPT | Mod: GC | Performed by: INTERNAL MEDICINE

## 2018-07-26 PROCEDURE — 70450 CT HEAD/BRAIN W/O DYE: CPT

## 2018-07-26 PROCEDURE — 71250 CT THORAX DX C-: CPT

## 2018-07-26 PROCEDURE — 80197 ASSAY OF TACROLIMUS: CPT | Performed by: EMERGENCY MEDICINE

## 2018-07-26 PROCEDURE — 71046 X-RAY EXAM CHEST 2 VIEWS: CPT

## 2018-07-26 PROCEDURE — 74177 CT ABD & PELVIS W/CONTRAST: CPT

## 2018-07-26 PROCEDURE — 87798 DETECT AGENT NOS DNA AMP: CPT | Performed by: STUDENT IN AN ORGANIZED HEALTH CARE EDUCATION/TRAINING PROGRAM

## 2018-07-26 PROCEDURE — 25000128 H RX IP 250 OP 636: Performed by: EMERGENCY MEDICINE

## 2018-07-26 PROCEDURE — 36415 COLL VENOUS BLD VENIPUNCTURE: CPT | Performed by: EMERGENCY MEDICINE

## 2018-07-26 PROCEDURE — 93308 TTE F-UP OR LMTD: CPT | Performed by: EMERGENCY MEDICINE

## 2018-07-26 PROCEDURE — 93308 TTE F-UP OR LMTD: CPT | Mod: 26 | Performed by: EMERGENCY MEDICINE

## 2018-07-26 PROCEDURE — G0463 HOSPITAL OUTPT CLINIC VISIT: HCPCS | Mod: ZF

## 2018-07-26 PROCEDURE — 83880 ASSAY OF NATRIURETIC PEPTIDE: CPT | Performed by: EMERGENCY MEDICINE

## 2018-07-26 PROCEDURE — 93010 ELECTROCARDIOGRAM REPORT: CPT | Mod: Z6 | Performed by: EMERGENCY MEDICINE

## 2018-07-26 RX ORDER — ALUMINA, MAGNESIA, AND SIMETHICONE 2400; 2400; 240 MG/30ML; MG/30ML; MG/30ML
30 SUSPENSION ORAL EVERY 4 HOURS PRN
Status: DISCONTINUED | OUTPATIENT
Start: 2018-07-26 | End: 2018-08-06 | Stop reason: HOSPADM

## 2018-07-26 RX ORDER — ASPIRIN 81 MG/1
324 TABLET, CHEWABLE ORAL ONCE
Status: COMPLETED | OUTPATIENT
Start: 2018-07-26 | End: 2018-07-27

## 2018-07-26 RX ORDER — ALBUTEROL SULFATE 90 UG/1
6 AEROSOL, METERED RESPIRATORY (INHALATION) EVERY 4 HOURS PRN
Status: DISCONTINUED | OUTPATIENT
Start: 2018-07-26 | End: 2018-08-06 | Stop reason: HOSPADM

## 2018-07-26 RX ORDER — PREDNISONE 20 MG/1
20 TABLET ORAL EVERY MORNING
Status: DISCONTINUED | OUTPATIENT
Start: 2018-07-27 | End: 2018-08-04

## 2018-07-26 RX ORDER — PANTOPRAZOLE SODIUM 40 MG/1
40 TABLET, DELAYED RELEASE ORAL EVERY MORNING
Status: DISCONTINUED | OUTPATIENT
Start: 2018-07-27 | End: 2018-08-06 | Stop reason: HOSPADM

## 2018-07-26 RX ORDER — PREDNISONE 5 MG/1
15 TABLET ORAL EVERY EVENING
Status: DISCONTINUED | OUTPATIENT
Start: 2018-07-26 | End: 2018-08-04

## 2018-07-26 RX ORDER — MYCOPHENOLATE MOFETIL 250 MG/1
750 CAPSULE ORAL 2 TIMES DAILY
Status: DISCONTINUED | OUTPATIENT
Start: 2018-07-26 | End: 2018-07-26

## 2018-07-26 RX ORDER — CEFEPIME HYDROCHLORIDE 1 G/1
1 INJECTION, POWDER, FOR SOLUTION INTRAMUSCULAR; INTRAVENOUS ONCE
Status: COMPLETED | OUTPATIENT
Start: 2018-07-26 | End: 2018-07-26

## 2018-07-26 RX ORDER — ASPIRIN 81 MG/1
81 TABLET ORAL DAILY
Status: DISCONTINUED | OUTPATIENT
Start: 2018-07-27 | End: 2018-08-06 | Stop reason: HOSPADM

## 2018-07-26 RX ORDER — BIOTIN 1 MG
5 TABLET ORAL DAILY
Status: DISCONTINUED | OUTPATIENT
Start: 2018-07-27 | End: 2018-08-06 | Stop reason: HOSPADM

## 2018-07-26 RX ORDER — ACETAMINOPHEN 325 MG/1
650 TABLET ORAL EVERY 4 HOURS PRN
Status: DISCONTINUED | OUTPATIENT
Start: 2018-07-26 | End: 2018-07-27

## 2018-07-26 RX ORDER — FLUTICASONE PROPIONATE 50 MCG
1-2 SPRAY, SUSPENSION (ML) NASAL DAILY
Status: DISCONTINUED | OUTPATIENT
Start: 2018-07-27 | End: 2018-08-06 | Stop reason: HOSPADM

## 2018-07-26 RX ORDER — IOPAMIDOL 755 MG/ML
105 INJECTION, SOLUTION INTRAVASCULAR ONCE
Status: COMPLETED | OUTPATIENT
Start: 2018-07-26 | End: 2018-07-26

## 2018-07-26 RX ORDER — NYSTATIN 100000/ML
1000000 SUSPENSION, ORAL (FINAL DOSE FORM) ORAL 4 TIMES DAILY
Status: DISCONTINUED | OUTPATIENT
Start: 2018-07-27 | End: 2018-08-06 | Stop reason: HOSPADM

## 2018-07-26 RX ORDER — LIDOCAINE 40 MG/G
CREAM TOPICAL
Status: DISCONTINUED | OUTPATIENT
Start: 2018-07-26 | End: 2018-08-06 | Stop reason: HOSPADM

## 2018-07-26 RX ORDER — TACROLIMUS 1 MG/1
4 CAPSULE ORAL 2 TIMES DAILY
Status: DISCONTINUED | OUTPATIENT
Start: 2018-07-26 | End: 2018-07-27

## 2018-07-26 RX ORDER — NICOTINE POLACRILEX 4 MG
15-30 LOZENGE BUCCAL
Status: DISCONTINUED | OUTPATIENT
Start: 2018-07-26 | End: 2018-08-06 | Stop reason: HOSPADM

## 2018-07-26 RX ORDER — ACETAMINOPHEN 325 MG/1
650 TABLET ORAL EVERY 4 HOURS PRN
Status: DISCONTINUED | OUTPATIENT
Start: 2018-07-26 | End: 2018-08-06 | Stop reason: HOSPADM

## 2018-07-26 RX ORDER — ACETAMINOPHEN 650 MG/1
650 SUPPOSITORY RECTAL EVERY 4 HOURS PRN
Status: DISCONTINUED | OUTPATIENT
Start: 2018-07-26 | End: 2018-07-26

## 2018-07-26 RX ORDER — ISOSORBIDE DINITRATE 10 MG/1
10 TABLET ORAL
Status: DISCONTINUED | OUTPATIENT
Start: 2018-07-27 | End: 2018-07-31

## 2018-07-26 RX ORDER — NITROGLYCERIN 0.4 MG/1
0.4 TABLET SUBLINGUAL EVERY 5 MIN PRN
Status: DISCONTINUED | OUTPATIENT
Start: 2018-07-26 | End: 2018-07-27

## 2018-07-26 RX ORDER — ACETAMINOPHEN 500 MG
1000 TABLET ORAL ONCE
Status: COMPLETED | OUTPATIENT
Start: 2018-07-26 | End: 2018-07-26

## 2018-07-26 RX ORDER — TRAMADOL HYDROCHLORIDE 50 MG/1
50 TABLET ORAL EVERY 6 HOURS PRN
Status: DISCONTINUED | OUTPATIENT
Start: 2018-07-26 | End: 2018-07-29

## 2018-07-26 RX ORDER — DEXTROSE MONOHYDRATE 25 G/50ML
25-50 INJECTION, SOLUTION INTRAVENOUS
Status: DISCONTINUED | OUTPATIENT
Start: 2018-07-26 | End: 2018-08-06 | Stop reason: HOSPADM

## 2018-07-26 RX ORDER — HYDRALAZINE HYDROCHLORIDE 25 MG/1
25 TABLET, FILM COATED ORAL 3 TIMES DAILY
Status: DISCONTINUED | OUTPATIENT
Start: 2018-07-27 | End: 2018-07-29

## 2018-07-26 RX ORDER — LIDOCAINE 40 MG/G
CREAM TOPICAL
Status: DISCONTINUED | OUTPATIENT
Start: 2018-07-26 | End: 2018-07-27

## 2018-07-26 RX ORDER — ROSUVASTATIN CALCIUM 10 MG/1
20 TABLET, COATED ORAL DAILY
Status: DISCONTINUED | OUTPATIENT
Start: 2018-07-27 | End: 2018-08-06 | Stop reason: HOSPADM

## 2018-07-26 RX ADMIN — SODIUM CHLORIDE 1000 ML: 9 INJECTION, SOLUTION INTRAVENOUS at 19:51

## 2018-07-26 RX ADMIN — SODIUM CHLORIDE, PRESERVATIVE FREE 76 ML: 5 INJECTION INTRAVENOUS at 20:04

## 2018-07-26 RX ADMIN — ACETAMINOPHEN 1000 MG: 500 TABLET, FILM COATED ORAL at 22:11

## 2018-07-26 RX ADMIN — VANCOMYCIN HYDROCHLORIDE 1500 MG: 10 INJECTION, POWDER, LYOPHILIZED, FOR SOLUTION INTRAVENOUS at 19:41

## 2018-07-26 RX ADMIN — IOPAMIDOL 105 ML: 755 INJECTION, SOLUTION INTRAVENOUS at 20:04

## 2018-07-26 RX ADMIN — ACYCLOVIR SODIUM 400 MG: 1000 INJECTION, SOLUTION INTRAVENOUS at 22:07

## 2018-07-26 RX ADMIN — CEFEPIME HYDROCHLORIDE 1 G: 1 INJECTION, POWDER, FOR SOLUTION INTRAMUSCULAR; INTRAVENOUS at 18:20

## 2018-07-26 RX ADMIN — SODIUM CHLORIDE 500 ML: 9 INJECTION, SOLUTION INTRAVENOUS at 17:59

## 2018-07-26 ASSESSMENT — ENCOUNTER SYMPTOMS
DIARRHEA: 0
DIFFICULTY URINATING: 0
SORE THROAT: 0
MYALGIAS: 1
DYSURIA: 0
SHORTNESS OF BREATH: 0
SINUS PAIN: 0
FEVER: 1
HEADACHES: 1
RHINORRHEA: 1
VOMITING: 0
ABDOMINAL PAIN: 0

## 2018-07-26 ASSESSMENT — PAIN SCALES - GENERAL: PAINLEVEL: NO PAIN (0)

## 2018-07-26 NOTE — IP AVS SNAPSHOT
[address] [address]    Phone:  [phone]                                       After Visit Summary   7/26/2018    Murray Nicholson    MRN: [id number]           After Visit Summary Signature Page     I have received my discharge instructions, and my questions have been answered. I have discussed any challenges I see with this plan with the nurse or doctor.    ..........................................................................................................................................  Patient/Patient Representative Signature      ..........................................................................................................................................  Patient Representative Print Name and Relationship to Patient    ..................................................               ................................................  Date                                            Time    ..........................................................................................................................................  Reviewed by Signature/Title    ...................................................              ..............................................  Date                                                            Time

## 2018-07-26 NOTE — LETTER
Transition Communication Hand-off for Care Transitions to Next Level of Care Provider    Name: Murray Nicholson  : 1955  MRN #: [id number]  Primary Care Provider: Yahir Turcios     Primary Clinic: [address] [place] [id number]     Reason for Hospitalization:  Heart replaced by transplant (H) [Z94.1]  Mouth sore [K13.79]  Neutropenic fever (H) [D70.9, R50.81]  Admit Date/Time: 2018  5:13 PM  Discharge Date: 18  Payor Source: Payor: MEDICARE / Plan: MEDICARE / Product Type: Medicare  Readmission Assessment Measure (MERCED) Risk Score/category: elevated.   Reason for Communication Hand-off Referral: Multiple providers/specialties  Discharge Plan:   Concern for non-adherence with plan of care: no  Discharge Needs Assessment:  Needs       Most Recent Value    Outpatient Dialysis -- [[facility] (Ph: [phone] Fax: [phone]]      Follow-up specialty is recommended: Yes    Follow-up plan:  Future Appointments  Date Time Provider Department Center   2018 9:30 AM Adilene Ayoub RN [facility]   2018 9:00 AM Rosa Calevrt PA-C [facility]   8/10/2018 10:00 AM UU LAB GOLD WAITING [facility]   8/10/2018 10:30 AM U2A ROOM 15 [facility]   8/10/2018 11:30 AM UUHCVR4 [facility]   8/10/2018 2:30 PM Mellisa Suh APRN [facility]   2018 9:30 AM Janine Trujillo PA-C TXS [facility]   2018 10:30 AM Eduardo Lea [facility]   2018 11:30 AM UCUSV2 CUS [facility]   2018 9:00 AM UU LAB GOLD WAITING [facility]   2018 9:30 AM U2A ROOM 17 [facility]   2018 10:30 AM UUHCVR3 [facility]   2018 1:30 PM [facility] Suh, [facility]   2018 2:00 PM UC SPEC INFUSION UCINPR [facility]   2018 8:00 AM UU LAB GOLD WAITING [facility]   2018 8:30 AM U2A ROOM 9 [facility]   2018 9:30 AM UUHCVR3 [facility]   9/10/2018 11:30 AM UUECHR2 UECH  [place] [facility]   9/10/2018 12:30 PM UUXR1 UUXRAY [place] [facility]   9/10/2018 1:00 PM UUUS1 UUUS [place] [facility]   9/10/2018 5:00 PM Klever Ernst MD [facility]   10/8/2018 10:00 AM [facility] [place] [facility]   10/8/2018 10:30 AM U2A ROOM 17 [facility] O   10/8/2018 11:30 [facility]   10/8/2018 1:30 PM Mellisa Suh APRN CNP [facility]   11/5/2018 8:00 AM [facility] [place] [facility]   11/5/2018 8:30 AM U2A ROOM 17 [facility] O   11/5/2018 9:30 AM UUHCVR3 [facility] O   11/5/2018 1:30 PM Mellisa Suh APRN [facility]   12/3/2018 8:00 AM UU LAB GOLD WAITING [facility] O   12/3/2018 8:30 AM UUECHR2 On license of [facility] O   12/3/2018 9:30 AM U2A ROOM 12 [facility] O   12/3/2018 10:30 AM UUHCVR3 [facility] O   12/3/2018 3:30 PM Klever Ernst MD [facility]   1/28/2019 2:00 PM Klever Ernst MD [facility]       Any outstanding tests or procedures:        Referrals     Future Labs/Procedures    Medication Therapy Management Referral     Comments:    MTM referral reason            Patient had a hospital or ED visit in last 6 months and has more than 10   PTA or Discharge medications    Patient has 5 PTA or Discharge Medications AND one of the following   diagnoses: DM,HF,COPD,AMI DX,PULM HTN       This service is designed to help you get the most from your medications.  A specially trained pharmacist will work closely with you and your doctors  to solve any problems related to your medications and to help you get the   best results from taking them.      The Medication Therapy Management staff will call you to schedule an appointment.            Key Recommendations:  Post hospitalization follow up.     JONATHAN BULLARD RN CC

## 2018-07-26 NOTE — LETTER
7/26/2018      RE: Murray Nicholson  [address]  [place] [address]       Dear Colleague,    Thank you for the opportunity to participate in the care of your patient, Murray Nicholson, at the [facility]. Please see a copy of my visit note below.    Dear Tres Turcios and Vida,    We had the pleasure of seeing Mr. Murray Vasquez cardiac transplant clinic continues to [facility].  As you know he is a very pleasant 63-year-old gentleman who underwent orthotopic heart transplantation on 6/14/2018.  He is currently listed for kidney transplant.  He returns today for his first follow-up visit.    Mr. Nicholson had an overall uneventful postoperative course following his transplant.  He had a leukocytosis of unclear etiology for which he received 10 days of antibiotics.  Mr. Roper was doing okay up until last week when he was noted to have neutropenia.  His CMV serology was negative.  We decreased his CellCept to 750 mg twice a day, stopped his Bactrim and gave him pentamidine, and stopped his Valcyte as well.  He was getting weekly CMV checks.    Mr. Nicholson has been having fevers on and off for the last 3 days.  He had a sore throat earlier this week which is resolved.  He is complaining of increased saliva very secretions.  He has developed a new ulcer in his oral cavity on the right side of the hard palate.  He has been fatigued and weak.  He has chills.  He is sleeping more than usual.  He fell while returning back home from dialysis.    He denies having cough, shortness of breath, abdominal pain, vomiting, diarrhea or skin rashes.  He has some neck pain.  He has been having headache on and off.  He denies having blurred vision or double vision.  He has no erythema, pain, and discharge from the IV catheter site.    His endomyocardial biopsies have been negative.  His prednisone has been tapered to 15 and 20.    Past medical  history    1. S/P OHT  2. Type 2 diabetes mellitus   3. Hyperlipidemia  4. Hypertension  5. Gout  6. End-stage renal disease requiring dialysis on HD    Past surgical history  #1 hernia repair  #2 PD catheter placement    Medications  No current facility-administered medications for this visit.      Current Outpatient Prescriptions   Medication Sig     acetaminophen (TYLENOL) 325 MG tablet Take 2 tablets (650 mg) by mouth every 4 hours as needed for mild pain (multimodal surgical pain management along with NSAIDS and opioid medication as indicated based on pain control and physical function.)     albuterol (PROAIR HFA/PROVENTIL HFA/VENTOLIN HFA) 108 (90 Base) MCG/ACT Inhaler Inhale 6 puffs into the lungs every 4 hours as needed for shortness of breath / dyspnea     apixaban ANTICOAGULANT (ELIQUIS) 2.5 MG tablet Take 1 tablet (2.5 mg) by mouth 2 times daily     ASPIRIN 81 MG OR TABS Take 1 tablet (81 mg) by mouth at bedtime     biotin (BIOTIN 5000) 5 MG CAPS Take 5 mg by mouth daily     blood glucose monitoring (ACCU-CHEK FASTCLIX) lancets Use to test blood sugar 2-3 times daily or as directed.  Ok to substitute alternative if insurance prefers.     blood glucose monitoring (NO BRAND SPECIFIED) test strip Use to test blood sugar 2-3 times daily or as directed.     fluticasone (FLONASE) 50 MCG/ACT spray Spray 1-2 sprays into both nostrils daily     hydrALAZINE (APRESOLINE) 25 MG tablet Take 1 tablet (25 mg) by mouth 3 times daily Hold if top number BP less than 110.     insulin isophane human (HUMULIN N PEN) 100 UNIT/ML injection Inject 10 Units Subcutaneous daily (with dinner)     insulin isophane human (HUMULIN N PEN) 100 UNIT/ML injection Inject 26 Units Subcutaneous every morning (before breakfast)     isosorbide dinitrate (ISORDIL) 10 MG tablet Take 1 tablet (10 mg) by mouth 3 times daily     melatonin 1 MG TABS tablet Take 2 tablets (2 mg) by mouth nightly as needed for sleep     mycophenolate (GENERIC  EQUIVALENT) 250 MG capsule Take 3 capsules (750 mg) by mouth 2 times daily     NEPHROCAPS 1 MG capsule Take 1 capsule by mouth daily     nystatin (MYCOSTATIN) 633537 UNIT/ML suspension Swish and swallow 10 mLs (1,000,000 Units) in mouth 4 times daily     pantoprazole (PROTONIX) 40 MG EC tablet Take 1 tablet (40 mg) by mouth every morning     pravastatin (PRAVACHOL) 40 MG tablet Take 1 tablet (40 mg) by mouth daily DC after current prescription completed; replace with rosuvastatin.     predniSONE (DELTASONE) 20 MG tablet Take 1 tablet (20 mg) by mouth every morning     predniSONE (DELTASONE) 5 MG tablet Take three tablets (15 mg) every PM     tacrolimus (GENERIC EQUIVALENT) 1 MG capsule Take four capsules (4 mg) every AM and PM     traMADol (ULTRAM) 50 MG tablet Take 1 tablet (50 mg) by mouth every 6 hours as needed for moderate pain     triamcinolone (KENALOG) 0.1 % ointment Apply topically 2 times daily     loratadine (CLARITIN) 10 MG tablet Take 10 mg by mouth daily as needed Reported on 5/3/2017     order for DME Equipment being ordered: Carol ()  Treatment Diagnosis: ESRD on PD  Pt has to be connected to PD all night and can not be disconnected, hence impending his mobility to go to the bathroom. At risk for infection if he does not have this equipment. (Patient not taking: Reported on 7/26/2018)     rosuvastatin (CRESTOR) 20 MG tablet Take 1 tablet (20 mg) by mouth daily (Patient not taking: Reported on 7/25/2018)     tacrolimus (GENERIC EQUIVALENT) 0.5 MG capsule On HOLD due to dose change (Patient not taking: Reported on 7/26/2018)     Facility-Administered Medications Ordered in Other Visits   Medication     0.9% sodium chloride BOLUS     lidocaine (LMX4) cream     lidocaine 1 % 1 mL     sodium chloride (PF) 0.9% PF flush 3 mL     sodium chloride (PF) 0.9% PF flush 3 mL     vancomycin (VANCOCIN) 1,500 mg in sodium chloride 0.9 % 250 mL intermittent infusion     vancomycin place bui - receiving  "intermittent dosing     Review of system:  A detailed 10 point review of system obtained as described in history of present illness all other systems reviewed and are negative      Examination:  He was tachypneic.  He was having chills.  /61 (BP Location: Left arm, Patient Position: Chair, Cuff Size: Adult Large)  Pulse 64  Temp 98.5  F (36.9  C) (Oral)  Ht 1.753 m (5' 9\")  Wt 77.7 kg (171 lb 4.8 oz)  SpO2 100%  BMI 25.3 kg/m2. He had no jugular venous distention.  He had no paler cyanosis or jaundice.  His carotids were 1+ bilaterally.  His pulse was regular rate and rhythm.  Cardiac auscultation revealed a normal S1 soft S2 and an early diastolic murmur in the aortic area.   Auscultation of his lungs reveal equal air entry on both sides with no added sounds.  His abdomen was soft with normal bowel sounds no tenderness no rigidity no guarding. He had no focal neurological deficit.  His extremities showed no edema.  He had 5 x 3 cm ulcerative lesion on the right side of his upper palate with necrotic base concerning for necrotizing infection.    CBC RESULTS:   Recent Labs   Lab Test  07/23/18   0914   WBC  1.5*   RBC  2.29*   HGB  7.0*   HCT  21.2*   MCV  93   MCH  30.6   MCHC  33.0   RDW  21.0*   PLT  216       Recent Labs   Lab Test  07/26/18   1732  07/23/18   0914   NA  129*  130*   POTASSIUM  4.4  5.3   CHLORIDE  94  99   CO2  26  18*   ANIONGAP  9  12   GLC  158*  285*   BUN  22  68*   CR  2.96*  7.09*   TRINI  8.4*  8.6       FK level - 8.6    CMV - Negative    Echo: Global and regional left ventricular function is hyperkinetic with an EF >70%.  Mild-moderate left ventricular hypertrophy.  Right ventricle with normal size and systolic function with mild hypertrophy.  No significant valve disease and no change in LVEF.    EKG: Sinus tachycardia    Assessment and Plan:    Mr. Nicholson is a very pleasant 63-year-old gentleman S/P OHT who returns today for follow up.     Impression:   1. Febrile " neutropenia with oral mucosal ulcer  2. Concern for sepsis in the setting of neutropenia, immunosuppression, and dialysis catheter.     Plan:  1. Will send him to ER for admission  2. IV fluids  3. Pan culture and chest x-ray  3. IV empiric antibiotics - Vancomycin and zosyn - renally dose adjusted. Transplant ID consult  4. Hematology consult - will need to give GM-CSF  5. ENT consult to evaluate the oral ulcers.   6. Will continue FK and consider decreasing cellcept dose further 250 mg bid  7. Immuknow as an inpatient  8. Will continue to hold bactrim and valcyte for now.       Sincerely,  Klever Ernst MD   [facility]  Heart Failure, Transplant, and Mechanical Circulatory Support Cardiology   Cardiovascular Division  [facility]   [phone]
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
## 2018-07-26 NOTE — IP AVS SNAPSHOT
MRN:[id number]                      After Visit Summary   7/26/2018    Murray Nicholson    MRN: [id number]           Thank you!     Thank you for choosing [facility] for your care. Our goal is always to provide you with excellent care. Hearing back from our patients is one way we can continue to improve our services. Please take a few minutes to complete the written survey that you may receive in the mail after you visit with us. Thank you!        Patient Information     Date Of Birth          1955        About your hospital stay     You were admitted on:  July 26, 2018 You last received care in the:  [address] [facility]    You were discharged on:  August 6, 2018        Reason for your hospital stay       You were admitted to the hospital for an infection in your blood. Please notify your cardiologist for recurrent fever, chills, and worsening headaches.                  Who to Call     For medical emergencies, please call 911.  For non-urgent questions about your medical care, please call your primary care provider or clinic, [phone]          Attending Provider     Provider Specialty    Kristal Ocampo MD Emergency Medicine Emergency Medical Services    Mavis Shearer MD Cardiology       Primary Care Provider Office Phone # Fax #    Yahir Alex Turcios [phone]      After Care Instructions     Diet       Follow this diet upon discharge: -Low potassium diet            Monitor and record       Blood sugar four times daily                  Follow-up Appointments     [facility] Follow-up and recommended labs and tests       Follow up with Rosa HU with Endocrinology Wednesday at 9:00 AM.   Repeat CBC with diff, CMP, and Tac level Wednesday.   Follow up with Cardiology as scheduled 8/10/18 for RHC and appointment with Ramya Suh NP.   Present to HD on Tuesday as scheduled.     Appointments on [place] and/or [facility] (with [facility] or [facility] provider or  service). Call [phone] if you haven't heard regarding these appointments within 7 days of discharge.            Follow Up and recommended labs and tests       ______________________________________________________    [facility] D[facility]   Phone: [phone]   Fax: [phone]    For resumption of outpatient dialysis on T-Th-Sat at 11 AM and the administration of Cefepime 2 Gm IV on 8/7/18 and 8/9/18.    _______________________________________________________                  Your next 10 appointments already scheduled     Aug 07, 2018  9:30 AM CDT   (Arrive by 9:15 AM)   Office Visit with Adilene Ayoub RN   [facility] Diabetes ([facility] [place])    [address] [address] [phone]           Bring a current list of meds and any records pertaining to this visit. For Physicals, please bring immunization records and any forms needing to be filled out. Please arrive 10 minutes early to complete paperwork.            Aug 08, 2018  9:00 AM CDT   (Arrive by 8:45 AM)   RETURN DIABETES with Rosa Calvert PA-C   [facility] Endocrinology ([facility])    [address] [place] [address] [phone]            Aug 10, 2018 10:00 AM CDT   LAB with [facility] G[facility] ([facility] [place])    [address] [facility] [address]              Please do not eat 10-12 hours before your appointment if you are coming in fasting for labs on lipids, cholesterol, or glucose (sugar). This does not apply to pregnant women. Water, hot tea and black coffee (with nothing added) are okay. Do not drink other fluids, diet soda or chew gum.            Aug 10, 2018 10:30 AM CDT   Procedure - 2.5 hour with U2A ROOM 15   [address] [facility] ([facility] [place])    [address] [address]                Aug 10, 2018 11:30 AM CDT   Heart Cath Heart Biopsy with [id number]   [facility] [place],  Heart Cath Lab ([facility] [place])    [address]  [facility] [id number] [phone]            Aug 10, 2018  2:30 PM CDT   (Arrive by 2:15 PM)   RETURN HEART TRANSPLANT with VERONICA Rocha CNP   [facility] ([facility])    [address]  [address]  [facility] [id number] [phone]            Aug 17, 2018  9:30 AM CDT   (Arrive by 9:15 AM)   RETURN WAIT LIST with Janine Trujillo PA-C   [facility] Solid Organ Transplant ([facility])    [address]  [address]  [facility] [id number] [phone]            Aug 17, 2018 10:30 AM CDT   (Arrive by 10:15 AM)   Office Visit with Eduardo Lea [facility] Medication Therapy Management ([facility])    [address]  3rd Floor  [facility] [id number] [phone]           Bring a current list of meds and any records pertaining to this visit. For Physicals, please bring immunization records and any forms needing to be filled out. Please arrive 10 minutes early to complete paperwork.            Aug 17, 2018 11:30 AM CDT   US AORTA/IVC/ILIAC DUPLEX COMPLETE with UCUSV2   [facility] Imaging Center US ([facility])    [address]  1st Floor  [facility] [id number] [phone]           Please bring a list of your medicines (including vitamins, minerals and over-the-counter drugs). Also, tell your doctor about any allergies you may have. Wear comfortable clothes and leave your valuables at home.  Adults: No eating or drinking for 8 hours before the exam. You may take medicine with a small sip of water.  Children: - Infants, breast-fed: may have breast milk up to 2 hours before exam. - Infants, formula: may have bottle until 4 hours before exam. - Children 1-5 years: No food  "or drink for 4 hours before exam. - Children 6 -12 years: No food or drink for 6 hours before exam. - Children over 12 years: No food or drink for 8 hours before exam. - J Tube Fed: No need to stop feedings.  Please call the Imaging Department at your exam site with any questions.            Aug 24, 2018  1:30 PM CDT   (Arrive by 1:15 PM)   RETURN HEART TRANSPLANT with VERONICA Rocha CNP   [facility] ([facility])    [address] [place] [address]  [address] [place] [address] [phone]              Additional Services     Medication Therapy Management Referral       MTM referral reason            Patient had a hospital or ED visit in last 6 months and has more than 10   PTA or Discharge medications    Patient has 5 PTA or Discharge Medications AND one of the following   diagnoses: DM,HF,COPD,AMI DX,PULM HTN       This service is designed to help you get the most from your medications.  A specially trained pharmacist will work closely with you and your doctors  to solve any problems related to your medications and to help you get the   best results from taking them.      The Medication Therapy Management staff will call you to schedule an appointment.                  Pending Results     No orders found from 7/24/2018 to 7/27/2018.            Statement of Approval     Ordered          08/06/18 1197  I have reviewed and agree with all the recommendations and orders detailed in this document.  EFFECTIVE NOW     Approved and electronically signed by:  Jennifer Dean APRN CNP             Admission Information     Date & Time Provider Department Dept. Phone    7/26/2018 Mavis Shearer MD [address] [facility] [phone]      Your Vitals Were     Blood Pressure Pulse Temperature Respirations Height Weight    157/108 (BP Location: Left arm) 85 97.9  F (36.6  C) (Axillary) 18 1.753 m (5' 9\") 77.2 kg (170 lb 3.2 oz)    Pulse Oximetry BMI (Body Mass Index)             "    100% 25.13 kg/m2          [employer] Information     [employer] gives you secure access to your electronic health record. If you see a primary care provider, you can also send messages to your care team and make appointments. If you have questions, please call your primary care clinic.  If you do not have a primary care provider, please call [phone] and they will assist you.        Care EveryWhere ID     This is your Care EveryWhere ID. This could be used by other organizations to access your [facility] medical records  [id number]        Equal Access to Services     [facility] : Hadii marsha wang hadroddyo Soomaali, waaxda luqadaha, qaybta kaalmada mignon, jose palencia . So [facility] [phone].    ATENCIÓN: Si habla español, tiene a ortiz disposición servicios gratuitos de asistencia lingüística. Llame al [phone].    We comply with applicable federal civil rights laws and [place] laws. We do not discriminate on the basis of race, color, national origin, age, disability, sex, sexual orientation, or gender identity.               Review of your medicines      START taking        Dose / Directions    ceFEPIme 1 g Solr vial   Commonly known as:  MAXIPIME   Indication:  Bacteria in the Blood   Used for:  Heart transplant recipient (H), Sepsis due to other etiology (H)        Dose:  2 g   Inject 2 g into the vein every 48 hours for 2 doses   Quantity:  4 g   Refills:  0       cloNIDine 0.1 MG tablet   Commonly known as:  CATAPRES   Used for:  Heart transplant recipient (H)        Dose:  0.1 mg   Take 1 tablet (0.1 mg) by mouth At Bedtime   Quantity:  60 tablet   Refills:  0       lisinopril 20 MG tablet   Commonly known as:  PRINIVIL/ZESTRIL   Used for:  Heart transplant recipient (H)        Dose:  20 mg   Start taking on:  8/7/2018   Take 1 tablet (20 mg) by mouth daily   Quantity:  30 tablet   Refills:  1         CONTINUE these medicines which may have CHANGED, or have new prescriptions. If  we are uncertain of the size of tablets/capsules you have at home, strength may be listed as something that might have changed.        Dose / Directions    hydrALAZINE 25 MG tablet   Commonly known as:  APRESOLINE   This may have changed:    - how much to take  - when to take this   Used for:  Essential hypertension, Heart transplanted (H)        Dose:  50 mg   Take 2 tablets (50 mg) by mouth 4 times daily Hold if top number BP less than 110.   Quantity:  270 tablet   Refills:  1       insulin isophane human 100 UNIT/ML injection   Commonly known as:  HumuLIN N PEN   This may have changed:    - how much to take  - how to take this  - when to take this  - additional instructions  - Another medication with the same name was removed. Continue taking this medication, and follow the directions you see here.   Used for:  Type 2 diabetes mellitus with stage 5 chronic kidney disease not on chronic dialysis, without long-term current use of insulin (H)        30 units in AM and 16 units in the evening. Please check blood sugar four times daily.   Quantity:  3 mL   Refills:  0       mycophenolate 250 MG capsule   Commonly known as:  GENERIC EQUIVALENT   This may have changed:  how much to take   Used for:  Heart transplanted (H)        Dose:  500 mg   Take 2 capsules (500 mg) by mouth 2 times daily   Quantity:  180 capsule   Refills:  11       predniSONE 5 MG tablet   Commonly known as:  DELTASONE   This may have changed:    - how much to take  - how to take this  - when to take this  - additional instructions  - Another medication with the same name was removed. Continue taking this medication, and follow the directions you see here.   Used for:  Heart transplant recipient (H)        Dose:  15 mg   Take 3 tablets (15 mg) by mouth 2 times daily   Quantity:  180 tablet   Refills:  0       tacrolimus 1 MG capsule   Commonly known as:  GENERIC EQUIVALENT   This may have changed:    - additional instructions  - Another medication  with the same name was removed. Continue taking this medication, and follow the directions you see here.        1 mg in AM and 2 mg at supper. Repeat level Wednesday.   Refills:  0         CONTINUE these medicines which have NOT CHANGED        Dose / Directions    acetaminophen 325 MG tablet   Commonly known as:  TYLENOL   Used for:  Acute post-operative pain        Dose:  650 mg   Take 2 tablets (650 mg) by mouth every 4 hours as needed for mild pain (multimodal surgical pain management along with NSAIDS and opioid medication as indicated based on pain control and physical function.)   Quantity:  100 tablet   Refills:  0       albuterol 108 (90 Base) MCG/ACT Inhaler   Commonly known as:  PROAIR HFA/PROVENTIL HFA/VENTOLIN HFA   Used for:  Dyspnea on exertion        Dose:  6 puff   Inhale 6 puffs into the lungs every 4 hours as needed for shortness of breath / dyspnea   Quantity:  1 Inhaler   Refills:  0       apixaban ANTICOAGULANT 2.5 MG tablet   Commonly known as:  ELIQUIS   Used for:  Heart transplanted (H)        Dose:  2.5 mg   Take 1 tablet (2.5 mg) by mouth 2 times daily   Quantity:  120 tablet   Refills:  0       aspirin 81 MG tablet        Take 1 tablet (81 mg) by mouth at bedtime   Refills:  0       biotin 5 MG Caps   Commonly known as:  BIOTIN 5000   Used for:  Brittle nails        Dose:  5 mg   Take 5 mg by mouth daily   Quantity:  30 capsule   Refills:  3       blood glucose monitoring lancets   Used for:  Type 2 diabetes mellitus with stage 5 chronic kidney disease not on chronic dialysis, without long-term current use of insulin (H)        Use to test blood sugar 2-3 times daily or as directed.  Ok to substitute alternative if insurance prefers.   Quantity:  102 each   Refills:  11       blood glucose monitoring test strip   Commonly known as:  no brand specified   Used for:  Type 2 diabetes mellitus with stage 5 chronic kidney disease not on chronic dialysis, without long-term current use of insulin  (H)        Use to test blood sugar 2-3 times daily or as directed.   Quantity:  100 each   Refills:  11       fluticasone 50 MCG/ACT spray   Commonly known as:  FLONASE   Used for:  Nasal congestion        Dose:  1-2 spray   Spray 1-2 sprays into both nostrils daily   Quantity:  16 g   Refills:  11       loratadine 10 MG tablet   Commonly known as:  CLARITIN   Used for:  Hypertensive cardiopathy, SOB (shortness of breath)        Dose:  10 mg   Take 10 mg by mouth daily as needed Reported on 5/3/2017   Refills:  0       melatonin 1 MG Tabs tablet   Used for:  Heart transplanted (H)        Dose:  2 mg   Take 2 tablets (2 mg) by mouth nightly as needed for sleep   Quantity:  120 tablet   Refills:  1       NEPHROCAPS 1 MG capsule        Dose:  1 capsule   Take 1 capsule by mouth daily   Quantity:  120 capsule   Refills:  0       nystatin 286855 UNIT/ML suspension   Commonly known as:  MYCOSTATIN   Used for:  Heart transplant recipient (H)        Dose:  8573498 Units   Swish and swallow 10 mLs (1,000,000 Units) in mouth 4 times daily   Quantity:  400 mL   Refills:  2       order for DME   Used for:  CKD (chronic kidney disease) stage 5, GFR less than 15 ml/min (H)        Equipment being ordered: Carol () Treatment Diagnosis: ESRD on PD Pt has to be connected to PD all night and can not be disconnected, hence impending his mobility to go to the bathroom. At risk for infection if he does not have this equipment.   Quantity:  1 Units   Refills:  0       pantoprazole 40 MG EC tablet   Commonly known as:  PROTONIX   Used for:  Heart transplant recipient (H)        Dose:  40 mg   Take 1 tablet (40 mg) by mouth every morning   Quantity:  30 tablet   Refills:  11       pravastatin 40 MG tablet   Commonly known as:  PRAVACHOL   Used for:  Dyslipidemia        Dose:  40 mg   Take 1 tablet (40 mg) by mouth daily DC after current prescription completed; replace with rosuvastatin.   Quantity:  90 tablet   Refills:  0        rosuvastatin 20 MG tablet   Commonly known as:  CRESTOR   Used for:  Heart transplant recipient (H)        Dose:  20 mg   Take 1 tablet (20 mg) by mouth daily   Quantity:  30 tablet   Refills:  1       traMADol 50 MG tablet   Commonly known as:  ULTRAM   Used for:  Acute post-operative pain        Dose:  50 mg   Take 1 tablet (50 mg) by mouth every 6 hours as needed for moderate pain   Quantity:  10 tablet   Refills:  0       triamcinolone 0.1 % ointment   Commonly known as:  KENALOG   Used for:  Xerosis of skin        Apply topically 2 times daily   Quantity:  80 g   Refills:  0         STOP taking     isosorbide dinitrate 10 MG tablet   Commonly known as:  ISORDIL                Where to get your medicines      These medications were sent to [facility] - [place], [place] - [address]  [address] [address]     Phone:  [phone]     cloNIDine 0.1 MG tablet    hydrALAZINE 25 MG tablet    lisinopril 20 MG tablet    mycophenolate 250 MG capsule    predniSONE 5 MG tablet         Some of these will need a paper prescription and others can be bought over the counter. Ask your nurse if you have questions.     Bring a paper prescription for each of these medications     ceFEPIme 1 g Solr vial                Protect others around you: Learn how to safely use, store and throw away your medicines at www.disposemymeds.org.        ANTIBIOTIC INSTRUCTION     You've Been Prescribed an Antibiotic - Now What?  Your healthcare team thinks that you or your loved one might have an infection. Some infections can be treated with antibiotics, which are powerful, life-saving drugs. Like all medications, antibiotics have side effects and should only be used when necessary. There are some important things you should know about your antibiotic treatment.      Your healthcare team may run tests before you start taking an antibiotic.    Your team may take samples (e.g., from your blood, urine or  other areas) to run tests to look for bacteria. These test can be important to determine if you need an antibiotic at all and, if you do, which antibiotic will work best.      Within a few days, your healthcare team might change or even stop your antibiotic.    Your team may start you on an antibiotic while they are working to find out what is making you sick.    Your team might change your antibiotic because test results show that a different antibiotic would be better to treat your infection.    In some cases, once your team has more information, they learn that you do not need an antibiotic at all. They may find out that you don't have an infection, or that the antibiotic you're taking won't work against your infection. For example, an infection caused by a virus can't be treated with antibiotics. Staying on an antibiotic when you don't need it is more likely to be harmful than helpful.      You may experience side effects from your antibiotic.    Like all medications, antibiotics have side effects. Some of these can be serious.    Let you healthcare team know if you have any known allergies when you are admitted to the hospital.    One significant side effect of nearly all antibiotics is the risk of severe and sometimes deadly diarrhea caused by Clostridium difficile (C. Difficile). This occurs when a person takes antibiotics because some good germs are destroyed. Antibiotic use allows C. diificile to take over, putting patients at high risk for this serious infection.    As a patient or caregiver, it is important to understand your or your loved one's antibiotic treatment. It is especially important for caregivers to speak up when patients can't speak for themselves. Here are some important questions to ask your healthcare team.    What infection is this antibiotic treating and how do you know I have that infection?    What side effects might occur from this antibiotic?    How long will I need to take this  antibiotic?    Is it safe to take this antibiotic with other medications or supplements (e.g., vitamins) that I am taking?     Are there any special directions I need to know about taking this antibiotic? For example, should I take it with food?    How will I be monitored to know whether my infection is responding to the antibiotic?    What tests may help to make sure the right antibiotic is prescribed for me?      Information provided by:  www.cdc.gov/getsmart  U.S. Department of Health and Human Services  Centers for disease Control and Prevention  National Center for Emerging and Zoonotic Infectious Diseases  Division of Healthcare Quality Promotion             Medication List: This is a list of all your medications and when to take them. Check marks below indicate your daily home schedule. Keep this list as a reference.      Medications           Morning Afternoon Evening Bedtime As Needed    acetaminophen 325 MG tablet   Commonly known as:  TYLENOL   Take 2 tablets (650 mg) by mouth every 4 hours as needed for mild pain (multimodal surgical pain management along with NSAIDS and opioid medication as indicated based on pain control and physical function.)   Last time this was given:  650 mg on 8/6/2018  9:11 AM                                albuterol 108 (90 Base) MCG/ACT Inhaler   Commonly known as:  PROAIR HFA/PROVENTIL HFA/VENTOLIN HFA   Inhale 6 puffs into the lungs every 4 hours as needed for shortness of breath / dyspnea   Last time this was given:  6 puffs on 8/6/2018  8:59 AM                                apixaban ANTICOAGULANT 2.5 MG tablet   Commonly known as:  ELIQUIS   Take 1 tablet (2.5 mg) by mouth 2 times daily   Last time this was given:  2.5 mg on 8/6/2018  8:31 AM                                aspirin 81 MG tablet   Take 1 tablet (81 mg) by mouth at bedtime                                biotin 5 MG Caps   Commonly known as:  BIOTIN 5000   Take 5 mg by mouth daily                                 blood glucose monitoring lancets   Use to test blood sugar 2-3 times daily or as directed.  Ok to substitute alternative if insurance prefers.                                blood glucose monitoring test strip   Commonly known as:  no brand specified   Use to test blood sugar 2-3 times daily or as directed.                                ceFEPIme 1 g Solr vial   Commonly known as:  MAXIPIME   Inject 2 g into the vein every 48 hours for 2 doses   Last time this was given:  1 g on 8/6/2018  4:29 PM                                cloNIDine 0.1 MG tablet   Commonly known as:  CATAPRES   Take 1 tablet (0.1 mg) by mouth At Bedtime   Last time this was given:  0.1 mg on 8/5/2018 10:05 PM                                fluticasone 50 MCG/ACT spray   Commonly known as:  FLONASE   Spray 1-2 sprays into both nostrils daily   Last time this was given:  2 sprays on 8/6/2018  8:59 AM                                hydrALAZINE 25 MG tablet   Commonly known as:  APRESOLINE   Take 2 tablets (50 mg) by mouth 4 times daily Hold if top number BP less than 110.   Last time this was given:  75 mg on 8/6/2018  1:29 PM                                insulin isophane human 100 UNIT/ML injection   Commonly known as:  HumuLIN N PEN   30 units in AM and 16 units in the evening. Please check blood sugar four times daily.   Last time this was given:  25 Units on 8/6/2018 10:31 AM                                lisinopril 20 MG tablet   Commonly known as:  PRINIVIL/ZESTRIL   Take 1 tablet (20 mg) by mouth daily   Start taking on:  8/7/2018   Last time this was given:  20 mg on 8/6/2018  8:31 AM                                loratadine 10 MG tablet   Commonly known as:  CLARITIN   Take 10 mg by mouth daily as needed Reported on 5/3/2017                                melatonin 1 MG Tabs tablet   Take 2 tablets (2 mg) by mouth nightly as needed for sleep                                mycophenolate 250 MG capsule   Commonly known as:  GENERIC  EQUIVALENT   Take 2 capsules (500 mg) by mouth 2 times daily   Last time this was given:  500 mg on 8/6/2018  5:50 PM                                NEPHROCAPS 1 MG capsule   Take 1 capsule by mouth daily   Last time this was given:  1 capsule on 8/6/2018  8:32 AM                                nystatin 250257 UNIT/ML suspension   Commonly known as:  MYCOSTATIN   Swish and swallow 10 mLs (1,000,000 Units) in mouth 4 times daily   Last time this was given:  1,000,000 Units on 8/6/2018  4:29 PM                                order for DME   Equipment being ordered: Carol () Treatment Diagnosis: ESRD on PD Pt has to be connected to PD all night and can not be disconnected, hence impending his mobility to go to the bathroom. At risk for infection if he does not have this equipment.                                pantoprazole 40 MG EC tablet   Commonly known as:  PROTONIX   Take 1 tablet (40 mg) by mouth every morning   Last time this was given:  40 mg on 8/6/2018  8:32 AM                                pravastatin 40 MG tablet   Commonly known as:  PRAVACHOL   Take 1 tablet (40 mg) by mouth daily DC after current prescription completed; replace with rosuvastatin.                                predniSONE 5 MG tablet   Commonly known as:  DELTASONE   Take 3 tablets (15 mg) by mouth 2 times daily   Last time this was given:  15 mg on 8/6/2018  5:50 PM                                rosuvastatin 20 MG tablet   Commonly known as:  CRESTOR   Take 1 tablet (20 mg) by mouth daily   Last time this was given:  20 mg on 8/6/2018  8:32 AM                                tacrolimus 1 MG capsule   Commonly known as:  GENERIC EQUIVALENT   1 mg in AM and 2 mg at supper. Repeat level Wednesday.   Last time this was given:  2 mg on 8/6/2018  5:50 PM                                traMADol 50 MG tablet   Commonly known as:  ULTRAM   Take 1 tablet (50 mg) by mouth every 6 hours as needed for moderate pain   Last time this was given:   50 mg on 8/5/2018 10:05 PM                                triamcinolone 0.1 % ointment   Commonly known as:  KENALOG   Apply topically 2 times daily

## 2018-07-26 NOTE — PROGRESS NOTES
Dear [facility] [place] [facility] [place],    We had the pleasure of seeing Mr. Murray Vasquez cardiac transplant clinic continues to [facility].  As you know he is a very pleasant 63-year-old gentleman who underwent orthotopic heart transplantation on 6/14/2018.  He is currently listed for kidney transplant.  He returns today for his first follow-up visit.    Mr. Nicholson had an overall uneventful postoperative course following his transplant.  He had a leukocytosis of unclear etiology for which he received 10 days of antibiotics.  Mr. Roper was doing okay up until last week when he was noted to have neutropenia.  His CMV serology was negative.  We decreased his CellCept to 750 mg twice a day, stopped his Bactrim and gave him pentamidine, and stopped his Valcyte as well.  He was getting weekly CMV checks.    Mr. Nicholson has been having fevers on and off for the last 3 days.  He had a sore throat earlier this week which is resolved.  He is complaining of increased saliva very secretions.  He has developed a new ulcer in his oral cavity on the right side of the hard palate.  He has been fatigued and weak.  He has chills.  He is sleeping more than usual.  He fell while returning back home from dialysis.    He denies having cough, shortness of breath, abdominal pain, vomiting, diarrhea or skin rashes.  He has some neck pain.  He has been having headache on and off.  He denies having blurred vision or double vision.  He has no erythema, pain, and discharge from the IV catheter site.    His endomyocardial biopsies have been negative.  His prednisone has been tapered to 15 and 20.    Past medical history    1. S/P OHT  2. Type 2 diabetes mellitus   3. Hyperlipidemia  4. Hypertension  5. Gout  6. End-stage renal disease requiring dialysis on HD    Past surgical history  #1 hernia repair  #2 PD catheter placement    Medications  No current facility-administered medications for this visit.      Current Outpatient Prescriptions    Medication Sig     acetaminophen (TYLENOL) 325 MG tablet Take 2 tablets (650 mg) by mouth every 4 hours as needed for mild pain (multimodal surgical pain management along with NSAIDS and opioid medication as indicated based on pain control and physical function.)     albuterol (PROAIR HFA/PROVENTIL HFA/VENTOLIN HFA) 108 (90 Base) MCG/ACT Inhaler Inhale 6 puffs into the lungs every 4 hours as needed for shortness of breath / dyspnea     apixaban ANTICOAGULANT (ELIQUIS) 2.5 MG tablet Take 1 tablet (2.5 mg) by mouth 2 times daily     ASPIRIN 81 MG OR TABS Take 1 tablet (81 mg) by mouth at bedtime     biotin (BIOTIN 5000) 5 MG CAPS Take 5 mg by mouth daily     blood glucose monitoring (ACCU-CHEK FASTCLIX) lancets Use to test blood sugar 2-3 times daily or as directed.  Ok to substitute alternative if insurance prefers.     blood glucose monitoring (NO BRAND SPECIFIED) test strip Use to test blood sugar 2-3 times daily or as directed.     fluticasone (FLONASE) 50 MCG/ACT spray Spray 1-2 sprays into both nostrils daily     hydrALAZINE (APRESOLINE) 25 MG tablet Take 1 tablet (25 mg) by mouth 3 times daily Hold if top number BP less than 110.     insulin isophane human (HUMULIN N PEN) 100 UNIT/ML injection Inject 10 Units Subcutaneous daily (with dinner)     insulin isophane human (HUMULIN N PEN) 100 UNIT/ML injection Inject 26 Units Subcutaneous every morning (before breakfast)     isosorbide dinitrate (ISORDIL) 10 MG tablet Take 1 tablet (10 mg) by mouth 3 times daily     melatonin 1 MG TABS tablet Take 2 tablets (2 mg) by mouth nightly as needed for sleep     mycophenolate (GENERIC EQUIVALENT) 250 MG capsule Take 3 capsules (750 mg) by mouth 2 times daily     NEPHROCAPS 1 MG capsule Take 1 capsule by mouth daily     nystatin (MYCOSTATIN) 283091 UNIT/ML suspension Swish and swallow 10 mLs (1,000,000 Units) in mouth 4 times daily     pantoprazole (PROTONIX) 40 MG EC tablet Take 1 tablet (40 mg) by mouth every morning      pravastatin (PRAVACHOL) 40 MG tablet Take 1 tablet (40 mg) by mouth daily DC after current prescription completed; replace with rosuvastatin.     predniSONE (DELTASONE) 20 MG tablet Take 1 tablet (20 mg) by mouth every morning     predniSONE (DELTASONE) 5 MG tablet Take three tablets (15 mg) every PM     tacrolimus (GENERIC EQUIVALENT) 1 MG capsule Take four capsules (4 mg) every AM and PM     traMADol (ULTRAM) 50 MG tablet Take 1 tablet (50 mg) by mouth every 6 hours as needed for moderate pain     triamcinolone (KENALOG) 0.1 % ointment Apply topically 2 times daily     loratadine (CLARITIN) 10 MG tablet Take 10 mg by mouth daily as needed Reported on 5/3/2017     order for DME Equipment being ordered: Carol ()  Treatment Diagnosis: ESRD on PD  Pt has to be connected to PD all night and can not be disconnected, hence impending his mobility to go to the bathroom. At risk for infection if he does not have this equipment. (Patient not taking: Reported on 7/26/2018)     rosuvastatin (CRESTOR) 20 MG tablet Take 1 tablet (20 mg) by mouth daily (Patient not taking: Reported on 7/25/2018)     tacrolimus (GENERIC EQUIVALENT) 0.5 MG capsule On HOLD due to dose change (Patient not taking: Reported on 7/26/2018)     Facility-Administered Medications Ordered in Other Visits   Medication     0.9% sodium chloride BOLUS     lidocaine (LMX4) cream     lidocaine 1 % 1 mL     sodium chloride (PF) 0.9% PF flush 3 mL     sodium chloride (PF) 0.9% PF flush 3 mL     vancomycin (VANCOCIN) 1,500 mg in sodium chloride 0.9 % 250 mL intermittent infusion     vancomycin place bui - receiving intermittent dosing     Review of system:  A detailed 10 point review of system obtained as described in history of present illness all other systems reviewed and are negative      Examination:  He was tachypneic.  He was having chills.  /61 (BP Location: Left arm, Patient Position: Chair, Cuff Size: Adult Large)  Pulse 64  Temp 98.5  F  "(36.9  C) (Oral)  Ht 1.753 m (5' 9\")  Wt 77.7 kg (171 lb 4.8 oz)  SpO2 100%  BMI 25.3 kg/m2. He had no jugular venous distention.  He had no paler cyanosis or jaundice.  His carotids were 1+ bilaterally.  His pulse was regular rate and rhythm.  Cardiac auscultation revealed a normal S1 soft S2 and an early diastolic murmur in the aortic area.   Auscultation of his lungs reveal equal air entry on both sides with no added sounds.  His abdomen was soft with normal bowel sounds no tenderness no rigidity no guarding. He had no focal neurological deficit.  His extremities showed no edema.  He had 5 x 3 cm ulcerative lesion on the right side of his upper palate with necrotic base concerning for necrotizing infection.    CBC RESULTS:   Recent Labs   Lab Test  07/23/18   0914   WBC  1.5*   RBC  2.29*   HGB  7.0*   HCT  21.2*   MCV  93   MCH  30.6   MCHC  33.0   RDW  21.0*   PLT  216       Recent Labs   Lab Test  07/26/18   1732  07/23/18   0914   NA  129*  130*   POTASSIUM  4.4  5.3   CHLORIDE  94  99   CO2  26  18*   ANIONGAP  9  12   GLC  158*  285*   BUN  22  68*   CR  2.96*  7.09*   TRINI  8.4*  8.6       FK level - 8.6    CMV - Negative    Echo: Global and regional left ventricular function is hyperkinetic with an EF >70%.  Mild-moderate left ventricular hypertrophy.  Right ventricle with normal size and systolic function with mild hypertrophy.  No significant valve disease and no change in LVEF.    EKG: Sinus tachycardia    Assessment and Plan:    Mr. Nicholson is a very pleasant 63-year-old gentleman S/P OHT who returns today for follow up.     Impression:   1. Febrile neutropenia with oral mucosal ulcer  2. Concern for sepsis in the setting of neutropenia, immunosuppression, and dialysis catheter.     Plan:  1. Will send him to ER for admission  2. IV fluids  3. Pan culture and chest x-ray  3. IV empiric antibiotics - Vancomycin and zosyn - renally dose adjusted. Transplant ID consult  4. Hematology consult - will " need to give GM-CSF  5. ENT consult to evaluate the oral ulcers.   6. Will continue FK and consider decreasing cellcept dose further 250 mg bid  7. Immuknow as an inpatient  8. Will continue to hold bactrim and valcyte for now.       Sincerely,  Klever Ernst MD   [facility]  Heart Failure, Transplant, and Mechanical Circulatory Support Cardiology   Cardiovascular Division  [facility]   [phone]

## 2018-07-26 NOTE — ED PROVIDER NOTES
History     Chief Complaint   Patient presents with     Mouth Lesions     HPI  Murray Nicholson is a 63 year old male with a history of congestive heart failure, coronary artery disease, heart transplant (06/14/2018), end stage renal disease, hyperlipidemia, hypertension, type II diabetes mellitus, anemia in chronic renal disease, and sleep apnea who presents to the emergency department with complaints of mouth sores and fevers for the past 4-5 days. Patient reports that the onset of his mouth sores was 4 days ago (07/22/2018), as well as a 100.1 F fever at night. Patient also reports that he has been experiencing headaches, sore teeth/gums, constant wet mouth and nasal drip. Patient denies any nausea, vomiting, abdominal pain, diarrhea, shortness of breath, cough, chest pain, or urinary symptoms.  Denies any new rash or wound redness.     Patient currently goes to hemodialysis every Tuesday, Thursday, and Saturday. Patient states he went to his scheduled dialysis appointment today and completed the full run. Patient also states that after hemodialysis today (07/26/2018) he lost his balance and fell flat on his back. Patient denies hitting his head or passing out at any point. He denies any back pain, hip pain, numbness or weakness into the legs.    Patient denies being on any antibiotics and bactrim was recently discontinued.  Patient is currently taking Isordil 10mg (3x per day), Prednisone 20mg by mouth in the morning and 15mg by mouth in the evening. On 07/19/2018, Tacrolimus was increased to 4 capsules (4mg) in the morning and evening.    Past Medical History:   Diagnosis Date     (HFpEF) heart failure with preserved ejection fraction (H)      Allergic rhinitis, cause unspecified      Anemia of chronic kidney failure      AS (aortic stenosis)      Ascending aortic aneurysm (H)      Bicuspid aortic valve      CAD (coronary artery disease)      Chronic kidney disease, stage 5 (H)      Congestive heart failure,  unspecified      Dyslipidemia      Esophageal reflux      ESRD (end stage renal disease) (H)      Hypersomnia with sleep apnea, unspecified      Hypertension      MGUS (monoclonal gammopathy of unknown significance)      Mitral regurgitation      SHEELA (obstructive sleep apnea)      Systolic heart failure (H)      Type 2 diabetes mellitus (H)        Past Surgical History:   Procedure Laterality Date     COLONOSCOPY N/A 5/3/2018    Procedure: COLONOSCOPY;  colonoscopy ;  Surgeon: Ammon Castillo MD;  Location: [facility]     ESOPHAGOSCOPY, GASTROSCOPY, DUODENOSCOPY (EGD), COMBINED N/A 2018    Procedure: COMBINED ENDOSCOPIC ULTRASOUND, ESOPHAGOSCOPY, GASTROSCOPY, DUODENOSCOPY (EGD), FINE NEEDLE ASPIRATE/BIOPSY;  Endoscopic Ultrasound with Fine Needle Aspiration ;  Surgeon: Alon Don MD;  Location: [facility] OR     LAPAROSCOPIC HERNIORRHAPHY INGUINAL BILATERAL Bilateral 2015    Procedure: LAPAROSCOPIC HERNIORRHAPHY INGUINAL BILATERAL;  Surgeon: Bobby Mcconnell MD;  Location: [facility] OR     LAPAROSCOPIC INSERTION CATHETER PERITONEAL DIALYSIS N/A 2017    Procedure: LAPAROSCOPIC INSERTION CATHETER PERITONEAL DIALYSIS;  Laparoscopic Peritoneal Dialysis Catheter Placement - Anesthesia with block;  Surgeon: Esteban Arvizu MD;  Location: [facility] OR     PICC INSERTION Left 2018    5Fr - 49cm (3cm external), Basilic vein, low SVC     REMOVE CATHETER PERITONEAL Right 1/15/2018    Procedure: REMOVE CATHETER PERITONEAL;  Open Removal of Peritoneal Dialysis Catheter ;  Surgeon: Esteban Arvizu MD;  Location: [facility] OR     TRANSPLANT HEART RECIPIENT N/A 2018    Procedure: TRANSPLANT HEART RECIPIENT;  Median Sternotomy, on-pump oxygenator, Heart Transplant;  Surgeon: Rony Caputo MD;  Location: [facility] OR       Family History   Problem Relation Age of Onset     C.A.D. Father       from-never knew father-age 60     Diabetes Father      Cerebrovascular Disease Father      Hypertension No family hx of       Breast Cancer No family hx of      Cancer - colorectal No family hx of      Prostate Cancer No family hx of      KIDNEY DISEASE No family hx of      Melanoma No family hx of      Skin Cancer No family hx of        Social History   Substance Use Topics     Smoking status: Former Smoker     Packs/day: 1.00     Years: 19.00     Types: Cigarettes     Quit date: 8/18/1994     Smokeless tobacco: Never Used     Alcohol use No       Current Facility-Administered Medications   Medication     0.9% sodium chloride BOLUS     lidocaine (LMX4) cream     lidocaine 1 % 1 mL     sodium chloride (PF) 0.9% PF flush 3 mL     sodium chloride (PF) 0.9% PF flush 3 mL     Current Outpatient Prescriptions   Medication     acetaminophen (TYLENOL) 325 MG tablet     apixaban ANTICOAGULANT (ELIQUIS) 2.5 MG tablet     ASPIRIN 81 MG OR TABS     fluticasone (FLONASE) 50 MCG/ACT spray     hydrALAZINE (APRESOLINE) 25 MG tablet     insulin isophane human (HUMULIN N PEN) 100 UNIT/ML injection     insulin isophane human (HUMULIN N PEN) 100 UNIT/ML injection     isosorbide dinitrate (ISORDIL) 10 MG tablet     mycophenolate (GENERIC EQUIVALENT) 250 MG capsule     NEPHROCAPS 1 MG capsule     nystatin (MYCOSTATIN) 128269 UNIT/ML suspension     pantoprazole (PROTONIX) 40 MG EC tablet     pravastatin (PRAVACHOL) 40 MG tablet     predniSONE (DELTASONE) 20 MG tablet     tacrolimus (GENERIC EQUIVALENT) 1 MG capsule     albuterol (PROAIR HFA/PROVENTIL HFA/VENTOLIN HFA) 108 (90 Base) MCG/ACT Inhaler     biotin (BIOTIN 5000) 5 MG CAPS     blood glucose monitoring (ACCU-CHEK FASTCLIX) lancets     blood glucose monitoring (NO BRAND SPECIFIED) test strip     loratadine (CLARITIN) 10 MG tablet     melatonin 1 MG TABS tablet     order for DME     predniSONE (DELTASONE) 5 MG tablet     rosuvastatin (CRESTOR) 20 MG tablet     tacrolimus (GENERIC EQUIVALENT) 0.5 MG capsule     traMADol (ULTRAM) 50 MG tablet     triamcinolone (KENALOG) 0.1 % ointment        Allergies  "  Allergen Reactions     Norco [Hydrocodone-Acetaminophen] Nausea and Vomiting     Cats      Throat tightness     Isosorbide Other (See Comments)     hypotension     Penicillins Hives     Seasonal Allergies      rhinitis     Shrimp      Throat closes          I have reviewed the Medications, Allergies, Past Medical and Surgical History, and Social History in the Epic system.    Review of Systems   Constitutional: Positive for fever.   HENT: Positive for mouth sores and rhinorrhea. Negative for sinus pain and sore throat.    Respiratory: Negative for shortness of breath.    Cardiovascular: Negative for chest pain.   Gastrointestinal: Negative for abdominal pain, diarrhea and vomiting.   Genitourinary: Negative for difficulty urinating and dysuria.   Musculoskeletal: Positive for myalgias.   Neurological: Positive for headaches.   All other systems reviewed and are negative.      Physical Exam   BP: (!) 86/52  Pulse: 109  Temp: 100.1  F (37.8  C)  Resp: 18  Height: 175.3 cm (5' 9\")  Weight: 77.7 kg (171 lb 4.8 oz)  SpO2: 100 %      Physical Exam   General: patient is alert and oriented, ill appearing  Head: atraumatic and normocephalic   EENT: moist mucus membranes without tonsillar erythema or exudates, ulceration in the hard palate along the anterior aspect with purulence, pupils round and reactive, sclera anicteric, TMs pearly gray, no effusion or erythema  Neck: supple with full range of motion, no meningismus  Cardiovascular: Sinus tachycardia, extremities warm and well perfused, trace bilateral lower extremity edema  Pulmonary: lungs clear to auscultation bilaterally without wheezing or rhonchi  Abdomen: soft, non-tender, nondistended, no CVA tenderness to palpation  Musculoskeletal: normal range of motion   Neurological: alert and oriented, moving all extremities symmetrically, gait normal, no facial droop, normal speech  Skin: warm, dry, sternal incision is clean dry and intact without surrounding erythema, " tenderness or fluctuance, abdominal port incisions are partially healed but without any surrounding erythema or tenderness      ED Course     ED Course     Procedures             EKG Interpretation:      Interpreted by Kristal Ocampo  Time reviewed: 1816  Symptoms at time of EKG: fever, weakness   Rhythm: sinus tachycardia  Rate: Tachycardia  Axis: Normal  Ectopy: none  Conduction: normal  ST Segments/ T Waves: No ST-T wave changes  Q Waves: none  Comparison to prior: Unchanged    Clinical Impression: sinus tachycardia                Critical Care time:  none       The patient has signs of Severe Sepsis as evidenced by:    1. 2 SIRS criteria, AND  2. Suspected infection, AND   3. Organ dysfunction: Lactic Acid > 1.9    Time severe sepsis diagnosis confirmed = 17:45 as this was the time when Lactate resulted, and the level was > 1.9      3 Hour Severe Sepsis Bundle Completion:  1. Initial Lactic Acid Result:   Recent Labs   Lab Test  07/26/18   1735  06/27/18   0629  06/24/18   0150   LACT  3.2*  0.9  1.4     2. Blood Cultures before Antibiotics: Yes  3. Broad Spectrum Antibiotics Administered: Yes     Anti-infectives (Future)    Start     Dose/Rate Route Frequency Ordered Stop    07/26/18 1936  acyclovir (ZOVIRAX) 400 mg in D5W 100 mL intermittent infusion      5 mg/kg × 77.7 kg Intravenous EVERY 24 HOURS 07/26/18 1920 07/26/18 1824  vancomycin place bui - receiving intermittent dosing      1 each Does not apply SEE ADMIN INSTRUCTIONS 07/26/18 1824 07/26/18 1805  vancomycin (VANCOCIN) 1,500 mg in sodium chloride 0.9 % 250 mL intermittent infusion      1,500 mg  over 90 Minutes Intravenous ONCE 07/26/18 1804          4. 1000 ml of IV fluids.  Ideal body weight: 70.7 kg (155 lb 13.8 oz)  Adjusted ideal body weight: 73.5 kg (162 lb 0.6 oz)              Labs Ordered and Resulted from Time of ED Arrival Up to the Time of Departure from the ED - No data to display         Assessments & Plan (with Medical  Decision Making)   Mr. Nicholson is a 63 year old male with a history of heart failure s/p transplant on 06/14/2018 (post op day 42 s/p transplant), end stage renal disease on dialysis, type II diabetes mellitus, history of bicuspid aortic valve and ascending aortic aneurism, hypertension and hyperlipidemia who presents with fever and oral sores. Upon arrival to the emergency department he is noted to be hypotensive and tachycardic. He is febrile with a temperature of 100.1 F. He is in no respiratory distress and saturating 100% on room air. He did just come from dialysis where they removed 2.3L. Patient was given a fluid bolus upon arrival in the setting of a lactate of 3.2. His blood pressure is responsive to fluids and has improved to the 110s/70s. At this point, broad differential diagnosis is considered given his immune suppression and progressive neutropenia. Different includes but is not limited to: bacteremia, bacterial oral ulcer causing his fevers vs. HSV vs. CMV vs. fungal infection, sinus infection, pneumonia, UTI, less likely sternal infection, meningitis. His sternal incision is clean without erythema, tenderness or fluctuance, no evidence of cellulitis. He denies any chest pain. Bedside cardiac ultrasound does not show evidence of pericardial effusion contributing to his hypotension. He does appear to have normal function. He remains anticoagulated on Eliquis due to a right IJ thrombus. This was infected during previous evaluation and certainly could be a contributing source to his ongoing fevers. In addition he does report an ongoing headache. He is neurologically intact and has no meningismus on exam.  Lower suspicion for bacterial meningitis.  Head CT negative.  Broad workup including CBC, CMP, blood culture, CMV, cryptococcal antigen, UA, viral and bacterial swab of his oral ulcer were obtained. He was started on cefdinir and Vancomycin for empiric coverage of febrile neutropenia. Patient will be  admitted to cardiology for further workup.     This part of the document was transcribed by Colette Riggs, Medical Scribe.     I have reviewed the nursing notes.    I have reviewed the findings, diagnosis, plan and need for follow up with the patient.    New Prescriptions    No medications on file       Final diagnoses:   Neutropenic fever (H)   Heart replaced by transplant (H)   Mouth sore     I, Colette Riggs, am serving as a trained medical scribe to document services personally performed by Kristal Ocampo MD, based on the provider's statements to me.      I, Kristal Ocampo MD, was physically present and have reviewed and verified the accuracy of this note documented by Colette Riggs.    7/26/2018   [facility], [place], EMERGENCY DEPARTMENT     Kristal Ocampo MD  07/26/18 [id number]

## 2018-07-26 NOTE — MR AVS SNAPSHOT
After Visit Summary   7/26/2018    Murray Nicholson    MRN: [id number]           Patient Information     Date Of Birth          1955        Visit Information        Provider Department      7/26/2018 4:00 PM Klever Ernst MD [facility]        Today's Diagnoses     Heart transplant recipient (H)           Follow-ups after your visit        Your next 10 appointments already scheduled     Jul 27, 2018  8:30 AM CDT   Lab with  LAB    Health Lab ([facility])    [address]  1st Floor  [facility] [address]   [phone]            Jul 27, 2018 11:00 AM CDT   Procedure - 2.5 hour with U2A ROOM 17   [facility] ([facility])    [address] [address]               Jul 27, 2018 12:00 PM CDT   Heart Cath Heart Biopsy with UUHCVR4   [facility]    [address] [address]   [phone]            Aug 10, 2018 10:00 AM CDT   LAB with VALERIA LAB GOLD WAITING   [facility]    [address] [address]              Please do not eat 10-12 hours before your appointment if you are coming in fasting for labs on lipids, cholesterol, or glucose (sugar). This does not apply to pregnant women. Water, hot tea and black coffee (with nothing added) are okay. Do not drink other fluids, diet soda or chew gum.            Aug 10, 2018 10:30 AM CDT   Procedure - 2.5 hour with U2A ROOM 15   [facility] ([facility])    [address] [address]               Aug 10, 2018 11:30 AM CDT   Heart Cath Heart Biopsy with UUHCVR3   [facility]    [address] [address]   [phone]             Aug 10, 2018  2:30 PM CDT   (Arrive by 2:15 PM)   RETURN HEART TRANSPLANT with VERONICA Rocha CNP   [facility] ([facility])    [address]  [address]  [place] [address] [phone]            Aug 17, 2018  9:30 AM CDT   (Arrive by 9:15 AM)   RETURN WAIT LIST with Janine Trujillo PA-C   [facility] Solid Organ Transplant ([facility])    [address]  [address]  [place] [id number] [phone]            Aug 17, 2018 10:30 AM CDT   (Arrive by 10:15 AM)   [facility]    [address]  3rd Floor  [place] [id number] [phone]           Bring a current list of meds and any records pertaining to this visit. For Physicals, please bring immunization records and any forms needing to be filled out. Please arrive 10 minutes early to complete paperwork.            Aug 17, 2018 11:30 AM CDT   US AORTA/IVC/ILIAC DUPLEX COMPLETE with UCUSV2   [facility] Imaging Center US ([facility])    [address]  1st Floor  [place] [address] [phone]           Please bring a list of your medicines (including vitamins, minerals and over-the-counter drugs). Also, tell your doctor about any allergies you may have. Wear comfortable clothes and leave your valuables at home.  Adults: No eating or drinking for 8 hours before the exam. You may take medicine with a small sip of water.  Children: - Infants, breast-fed: may have breast milk up to 2 hours before exam. - Infants, formula: may have bottle until 4 hours before exam. - Children 1-5 years: No food or drink for 4 hours before exam. - Children 6 -12 years: No food or drink for 6 hours before exam. - Children over 12 years: No food or drink for 8 hours before exam. - J Tube Fed: No need to stop feedings.  Please  call the Imaging Department at your exam site with any questions.              Future tests that were ordered for you today     Open Standing Orders        Priority Remaining Interval Expires Ordered    Basic metabolic panel Routine 15/16 DAILY  7/27/2018    CBC with platelets differential Routine 15/16 DAILY  7/27/2018    Magnesium Routine 15/16 DAILY  7/27/2018    EKG 12-lead, tracing only STAT 100/100 CONDITIONAL (SPECIFY)  7/26/2018    Oxygen: Nasal cannula Routine 14404/44944 PRN  7/26/2018    EKG 12-lead, tracing only Routine 1/1 CONDITIONAL X 1  7/26/2018    Smoking Cessation Program IP Consult Routine 1/1 CONDITIONAL X 1  7/26/2018            Who to contact     If you have questions or need follow up information about today's clinic visit or your schedule please contact [facility] directly at [phone].  Normal or non-critical lab and imaging results will be communicated to you by [employer], letter or phone within 4 business days after the clinic has received the results. If you do not hear from us within 7 days, please contact the clinic through [employer] or phone. If you have a critical or abnormal lab result, we will notify you by phone as soon as possible.  Submit refill requests through [employer] or call your pharmacy and they will forward the refill request to us. Please allow 3 business days for your refill to be completed.          Additional Information About Your Visit        [employer] Information     [employer] gives you secure access to your electronic health record. If you see a primary care provider, you can also send messages to your care team and make appointments. If you have questions, please call your primary care clinic.  If you do not have a primary care provider, please call [phone] and they will assist you.        Care EveryWhere ID     This is your Care EveryWhere ID. This could be used by other organizations to access your [facility] medical records  [id number]        Your  "Vitals Were     Pulse Temperature Height Pulse Oximetry BMI (Body Mass Index)       64 98.5  F (36.9  C) (Oral) 1.753 m (5' 9\") 100% 25.3 kg/m2        Blood Pressure from Last 3 Encounters:   07/27/18 120/73   07/26/18 101/61   07/20/18 114/69    Weight from Last 3 Encounters:   07/26/18 77.9 kg (171 lb 11.2 oz)   07/26/18 77.7 kg (171 lb 4.8 oz)   07/20/18 78.1 kg (172 lb 1.6 oz)              Today, you had the following     No orders found for display       Primary Care Provider Office Phone # Fax #    Yahir Turcios [phone]       [address]  [facility] [address]        Goals        Lifestyle    Increase physical activity     Notes - Note created  7/3/2018  1:51 PM by Carrie Tran RN    Goal Statement: I will start cardiac rehab  Measure of Success: starts cardiac rehab  Supportive Steps to Achieve: support of RN CC  Barriers: none  Strengths: willingness to participate   Date to Achieve By: 7-15-18          Equal Access to Services     [facility] AH: Hadii aad ku haddanial Sotracey, waaxda luqadaha, qaybta kaalmada adedorothyyada, jose palencia . So [facility] [phone].    ATENCIÓN: Si habla español, tiene a ortiz disposición servicios gratuitos de asistencia lingüística. Llame al [phone].    We comply with applicable federal civil rights laws and [place] laws. We do not discriminate on the basis of race, color, national origin, age, disability, sex, sexual orientation, or gender identity.            Thank you!     Thank you for choosing [facility]  for your care. Our goal is always to provide you with excellent care. Hearing back from our patients is one way we can continue to improve our services. Please take a few minutes to complete the written survey that you may receive in the mail after your visit with us. Thank you!             Your Updated Medication List - Protect others around you: Learn how to safely use, store and throw away your " medicines at www.disposemymeds.org.          This list is accurate as of 7/26/18  5:13 PM.  Always use your most recent med list.                   Brand Name Dispense Instructions for use Diagnosis    acetaminophen 325 MG tablet    TYLENOL    100 tablet    Take 2 tablets (650 mg) by mouth every 4 hours as needed for mild pain (multimodal surgical pain management along with NSAIDS and opioid medication as indicated based on pain control and physical function.)    Acute post-operative pain       albuterol 108 (90 Base) MCG/ACT Inhaler    PROAIR HFA/PROVENTIL HFA/VENTOLIN HFA    1 Inhaler    Inhale 6 puffs into the lungs every 4 hours as needed for shortness of breath / dyspnea    Dyspnea on exertion       apixaban ANTICOAGULANT 2.5 MG tablet    ELIQUIS    120 tablet    Take 1 tablet (2.5 mg) by mouth 2 times daily    Heart transplanted (H)       aspirin 81 MG tablet      Take 1 tablet (81 mg) by mouth at bedtime        biotin 5 MG Caps    BIOTIN 5000    30 capsule    Take 5 mg by mouth daily    Brittle nails       blood glucose monitoring lancets     102 each    Use to test blood sugar 2-3 times daily or as directed.  Ok to substitute alternative if insurance prefers.    Type 2 diabetes mellitus with stage 5 chronic kidney disease not on chronic dialysis, without long-term current use of insulin (H)       blood glucose monitoring test strip    no brand specified    100 each    Use to test blood sugar 2-3 times daily or as directed.    Type 2 diabetes mellitus with stage 5 chronic kidney disease not on chronic dialysis, without long-term current use of insulin (H)       fluticasone 50 MCG/ACT spray    FLONASE    16 g    Spray 1-2 sprays into both nostrils daily    Nasal congestion       hydrALAZINE 25 MG tablet    APRESOLINE    90 tablet    Take 1 tablet (25 mg) by mouth 3 times daily Hold if top number BP less than 110.    Essential hypertension, Heart transplanted (H)       * insulin isophane human 100 UNIT/ML  injection    HumuLIN N PEN    3 mL    Inject 10 Units Subcutaneous daily (with dinner)    Type 2 diabetes mellitus with stage 5 chronic kidney disease not on chronic dialysis, without long-term current use of insulin (H)       * insulin isophane human 100 UNIT/ML injection    HumuLIN N PEN    3 mL    Inject 26 Units Subcutaneous every morning (before breakfast)    Type 2 diabetes mellitus with stage 5 chronic kidney disease not on chronic dialysis, without long-term current use of insulin (H)       isosorbide dinitrate 10 MG tablet    ISORDIL    90 tablet    Take 1 tablet (10 mg) by mouth 3 times daily    Heart transplanted (H), Hypertension goal BP (blood pressure) < 130/80       loratadine 10 MG tablet    CLARITIN     Take 10 mg by mouth daily as needed Reported on 5/3/2017    Hypertensive cardiopathy, SOB (shortness of breath)       melatonin 1 MG Tabs tablet     120 tablet    Take 2 tablets (2 mg) by mouth nightly as needed for sleep    Heart transplanted (H)       mycophenolate 250 MG capsule    GENERIC EQUIVALENT    180 capsule    Take 3 capsules (750 mg) by mouth 2 times daily    Heart transplanted (H)       NEPHROCAPS 1 MG capsule     120 capsule    Take 1 capsule by mouth daily        nystatin 447759 UNIT/ML suspension    MYCOSTATIN    400 mL    Swish and swallow 10 mLs (1,000,000 Units) in mouth 4 times daily    Heart transplant recipient (H)       order for DME     1 Units    Equipment being ordered: Carol () Treatment Diagnosis: ESRD on PD Pt has to be connected to PD all night and can not be disconnected, hence impending his mobility to go to the bathroom. At risk for infection if he does not have this equipment.    CKD (chronic kidney disease) stage 5, GFR less than 15 ml/min (H)       pantoprazole 40 MG EC tablet    PROTONIX    30 tablet    Take 1 tablet (40 mg) by mouth every morning    Heart transplant recipient (H)       pravastatin 40 MG tablet    PRAVACHOL    90 tablet    Take 1 tablet (40  mg) by mouth daily DC after current prescription completed; replace with rosuvastatin.    Dyslipidemia       * predniSONE 20 MG tablet    DELTASONE    30 tablet    Take 1 tablet (20 mg) by mouth every morning    Heart transplant recipient (H)       * predniSONE 5 MG tablet    DELTASONE    180 tablet    Take three tablets (15 mg) every PM    Heart transplant recipient (H)       rosuvastatin 20 MG tablet    CRESTOR    30 tablet    Take 1 tablet (20 mg) by mouth daily    Heart transplant recipient (H)       * tacrolimus 0.5 MG capsule    GENERIC EQUIVALENT    60 capsule    On HOLD due to dose change    Heart transplant recipient (H)       * tacrolimus 1 MG capsule    GENERIC EQUIVALENT    240 capsule    Take four capsules (4 mg) every AM and PM    Heart transplant recipient (H)       traMADol 50 MG tablet    ULTRAM    10 tablet    Take 1 tablet (50 mg) by mouth every 6 hours as needed for moderate pain    Acute post-operative pain       triamcinolone 0.1 % ointment    KENALOG    80 g    Apply topically 2 times daily    Xerosis of skin       * Notice:  This list has 6 medication(s) that are the same as other medications prescribed for you. Read the directions carefully, and ask your doctor or other care provider to review them with you.

## 2018-07-26 NOTE — ED NOTES
Bed: IN06  Expected date:   Expected time:   Means of arrival: Hospital Transport  Comments:  Murray Nicholson  [id number]  Heart trans x6 weeks, leukopenic, dialysis pt with a mouth infection on roof of mouth about quarter size. Needs antibiotics and waiting a 6C vs 4E bed, Dr Shearer Cards 2 staff aware.

## 2018-07-26 NOTE — PHARMACY-VANCOMYCIN DOSING SERVICE
Pharmacy Vancomycin Initial Note  Date of Service 2018  Patient's  1955  63 year old, male    Indication: Febrile Neutropenia    Current estimated CrCl = on HD  Creatinine for last 3 days  No results found for requested labs within last 72 hours.    Recent Vancomycin Level(s) for last 3 days  No results found for requested labs within last 72 hours.      Vancomycin IV Administrations (past 72 hours)      No vancomycin orders with administrations in past 72 hours.                Nephrotoxins and other renal medications     None          Contrast Orders - past 72 hours     None                Plan:  1.  Start vancomycin  1500 mg IV x1.   2.  Goal Trough Level: 10-15 mg/L   3.  Pharmacy will check trough levels as appropriate in 1-3 Days.    4. Serum creatinine levels will be ordered daily for the first week of therapy and at least twice weekly for subsequent weeks.    5. [place] method utilized to dose vancomycin therapy: Method 2    Eleanor K. Broadbent, PharmD, [facility]  PGY-2 Infectious Diseases Resident

## 2018-07-26 NOTE — ED TRIAGE NOTES
Heart trans x6 weeks, leukopenic, dialysis pt with a mouth infection on roof of mouth about quarter size. Needs antibiotics and waiting on a 6C bed.

## 2018-07-26 NOTE — NURSING NOTE
Vitals completed successfully and medication reconciled. Julita Cagle MA  Chief Complaint   Patient presents with     Follow Up For     6 week follow up post heart transplant

## 2018-07-27 ENCOUNTER — APPOINTMENT (OUTPATIENT)
Dept: MEDSURG UNIT | Facility: CLINIC | Age: 63
End: 2018-07-27
Attending: NURSE PRACTITIONER
Payer: MEDICARE

## 2018-07-27 LAB
ABO + RH BLD: NORMAL
ABO + RH BLD: NORMAL
ANION GAP SERPL CALCULATED.3IONS-SCNC: 12 MMOL/L (ref 3–14)
ANISOCYTOSIS BLD QL SMEAR: ABNORMAL
BASOPHILS # BLD AUTO: 0 10E9/L (ref 0–0.2)
BASOPHILS NFR BLD AUTO: 0 %
BLD GP AB SCN SERPL QL: NORMAL
BLD PROD TYP BPU: NORMAL
BLOOD BANK CMNT PATIENT-IMP: NORMAL
BUN SERPL-MCNC: 36 MG/DL (ref 7–30)
CALCIUM SERPL-MCNC: 7.8 MG/DL (ref 8.5–10.1)
CHLORIDE SERPL-SCNC: 96 MMOL/L (ref 94–109)
CO2 SERPL-SCNC: 21 MMOL/L (ref 20–32)
CREAT SERPL-MCNC: 4.19 MG/DL (ref 0.66–1.25)
DIFFERENTIAL METHOD BLD: ABNORMAL
EOSINOPHIL # BLD AUTO: 0 10E9/L (ref 0–0.7)
EOSINOPHIL NFR BLD AUTO: 0.9 %
ERYTHROCYTE [DISTWIDTH] IN BLOOD BY AUTOMATED COUNT: 20.5 % (ref 10–15)
GFR SERPL CREATININE-BSD FRML MDRD: 14 ML/MIN/1.7M2
GLUCOSE BLDC GLUCOMTR-MCNC: 106 MG/DL (ref 70–99)
GLUCOSE BLDC GLUCOMTR-MCNC: 194 MG/DL (ref 70–99)
GLUCOSE BLDC GLUCOMTR-MCNC: 194 MG/DL (ref 70–99)
GLUCOSE BLDC GLUCOMTR-MCNC: 484 MG/DL (ref 70–99)
GLUCOSE BLDC GLUCOMTR-MCNC: 487 MG/DL (ref 70–99)
GLUCOSE BLDC GLUCOMTR-MCNC: 69 MG/DL (ref 70–99)
GLUCOSE BLDC GLUCOMTR-MCNC: 84 MG/DL (ref 70–99)
GLUCOSE SERPL-MCNC: 202 MG/DL (ref 70–99)
HCT VFR BLD AUTO: 18.6 % (ref 40–53)
HGB BLD-MCNC: 6.1 G/DL (ref 13.3–17.7)
HGB BLD-MCNC: 7.2 G/DL (ref 13.3–17.7)
HSV1 DNA SPEC QL NAA+PROBE: NEGATIVE
HSV1 DNA SPEC QL NAA+PROBE: NEGATIVE
HSV2 DNA SPEC QL NAA+PROBE: NEGATIVE
HSV2 DNA SPEC QL NAA+PROBE: NEGATIVE
INTERPRETATION ECG - MUSE: NORMAL
LYMPHOCYTES # BLD AUTO: 0 10E9/L (ref 0.8–5.3)
LYMPHOCYTES NFR BLD AUTO: 3.4 %
MACROCYTES BLD QL SMEAR: PRESENT
MAGNESIUM SERPL-MCNC: 1.4 MG/DL (ref 1.6–2.3)
MCH RBC QN AUTO: 29.2 PG (ref 26.5–33)
MCHC RBC AUTO-ENTMCNC: 32.8 G/DL (ref 31.5–36.5)
MCV RBC AUTO: 89 FL (ref 78–100)
MONOCYTES # BLD AUTO: 0.2 10E9/L (ref 0–1.3)
MONOCYTES NFR BLD AUTO: 23.3 %
MRSA DNA SPEC QL NAA+PROBE: NEGATIVE
NEUTROPHILS # BLD AUTO: 0.7 10E9/L (ref 1.6–8.3)
NEUTROPHILS NFR BLD AUTO: 72.4 %
NUM BPU REQUESTED: 1
PHOSPHATE SERPL-MCNC: 3 MG/DL (ref 2.5–4.5)
PLATELET # BLD AUTO: 168 10E9/L (ref 150–450)
PLATELET # BLD EST: ABNORMAL 10*3/UL
POIKILOCYTOSIS BLD QL SMEAR: SLIGHT
POTASSIUM SERPL-SCNC: 4.2 MMOL/L (ref 3.4–5.3)
RBC # BLD AUTO: 2.09 10E12/L (ref 4.4–5.9)
RBC INCLUSIONS BLD: SLIGHT
SODIUM SERPL-SCNC: 129 MMOL/L (ref 133–144)
SPECIMEN EXP DATE BLD: NORMAL
SPECIMEN SOURCE: NORMAL
SPECIMEN TYPE: NORMAL
TACROLIMUS BLD-MCNC: 20.3 UG/L (ref 5–15)
TACROLIMUS BLD-MCNC: 22.5 UG/L (ref 5–15)
TME LAST DOSE: ABNORMAL H
TME LAST DOSE: ABNORMAL H
VARICELLA ZOSTER DNA PCR COMMENT: NORMAL
VZV DNA SPEC QL NAA+PROBE: NORMAL
WBC # BLD AUTO: 1 10E9/L (ref 4–11)

## 2018-07-27 PROCEDURE — 87529 HSV DNA AMP PROBE: CPT | Performed by: STUDENT IN AN ORGANIZED HEALTH CARE EDUCATION/TRAINING PROGRAM

## 2018-07-27 PROCEDURE — 25000128 H RX IP 250 OP 636: Performed by: STUDENT IN AN ORGANIZED HEALTH CARE EDUCATION/TRAINING PROGRAM

## 2018-07-27 PROCEDURE — 00000146 ZZHCL STATISTIC GLUCOSE BY METER IP

## 2018-07-27 PROCEDURE — 25000131 ZZH RX MED GY IP 250 OP 636 PS 637: Mod: GY | Performed by: STUDENT IN AN ORGANIZED HEALTH CARE EDUCATION/TRAINING PROGRAM

## 2018-07-27 PROCEDURE — 85018 HEMOGLOBIN: CPT | Performed by: INTERNAL MEDICINE

## 2018-07-27 PROCEDURE — 87798 DETECT AGENT NOS DNA AMP: CPT | Performed by: INTERNAL MEDICINE

## 2018-07-27 PROCEDURE — 86923 COMPATIBILITY TEST ELECTRIC: CPT | Performed by: INTERNAL MEDICINE

## 2018-07-27 PROCEDURE — 80048 BASIC METABOLIC PNL TOTAL CA: CPT | Performed by: STUDENT IN AN ORGANIZED HEALTH CARE EDUCATION/TRAINING PROGRAM

## 2018-07-27 PROCEDURE — 86900 BLOOD TYPING SEROLOGIC ABO: CPT | Performed by: INTERNAL MEDICINE

## 2018-07-27 PROCEDURE — 36415 COLL VENOUS BLD VENIPUNCTURE: CPT | Performed by: INTERNAL MEDICINE

## 2018-07-27 PROCEDURE — 36415 COLL VENOUS BLD VENIPUNCTURE: CPT | Performed by: STUDENT IN AN ORGANIZED HEALTH CARE EDUCATION/TRAINING PROGRAM

## 2018-07-27 PROCEDURE — 85025 COMPLETE CBC W/AUTO DIFF WBC: CPT | Performed by: STUDENT IN AN ORGANIZED HEALTH CARE EDUCATION/TRAINING PROGRAM

## 2018-07-27 PROCEDURE — 12000006 ZZH R&B IMCU INTERMEDIATE UMMC

## 2018-07-27 PROCEDURE — 25000131 ZZH RX MED GY IP 250 OP 636 PS 637: Mod: GY | Performed by: INTERNAL MEDICINE

## 2018-07-27 PROCEDURE — 99233 SBSQ HOSP IP/OBS HIGH 50: CPT | Mod: GC | Performed by: INTERNAL MEDICINE

## 2018-07-27 PROCEDURE — 80197 ASSAY OF TACROLIMUS: CPT | Performed by: STUDENT IN AN ORGANIZED HEALTH CARE EDUCATION/TRAINING PROGRAM

## 2018-07-27 PROCEDURE — 87640 STAPH A DNA AMP PROBE: CPT | Performed by: STUDENT IN AN ORGANIZED HEALTH CARE EDUCATION/TRAINING PROGRAM

## 2018-07-27 PROCEDURE — A9270 NON-COVERED ITEM OR SERVICE: HCPCS | Mod: GY | Performed by: STUDENT IN AN ORGANIZED HEALTH CARE EDUCATION/TRAINING PROGRAM

## 2018-07-27 PROCEDURE — 25000132 ZZH RX MED GY IP 250 OP 250 PS 637: Mod: GY | Performed by: STUDENT IN AN ORGANIZED HEALTH CARE EDUCATION/TRAINING PROGRAM

## 2018-07-27 PROCEDURE — 25000125 ZZHC RX 250: Performed by: STUDENT IN AN ORGANIZED HEALTH CARE EDUCATION/TRAINING PROGRAM

## 2018-07-27 PROCEDURE — 86850 RBC ANTIBODY SCREEN: CPT | Performed by: INTERNAL MEDICINE

## 2018-07-27 PROCEDURE — 86901 BLOOD TYPING SEROLOGIC RH(D): CPT | Performed by: INTERNAL MEDICINE

## 2018-07-27 PROCEDURE — 83735 ASSAY OF MAGNESIUM: CPT | Performed by: STUDENT IN AN ORGANIZED HEALTH CARE EDUCATION/TRAINING PROGRAM

## 2018-07-27 PROCEDURE — 87641 MR-STAPH DNA AMP PROBE: CPT | Performed by: STUDENT IN AN ORGANIZED HEALTH CARE EDUCATION/TRAINING PROGRAM

## 2018-07-27 PROCEDURE — 84100 ASSAY OF PHOSPHORUS: CPT | Performed by: STUDENT IN AN ORGANIZED HEALTH CARE EDUCATION/TRAINING PROGRAM

## 2018-07-27 RX ORDER — DEXTROSE MONOHYDRATE 25 G/50ML
25-50 INJECTION, SOLUTION INTRAVENOUS
Status: DISCONTINUED | OUTPATIENT
Start: 2018-07-27 | End: 2018-07-27

## 2018-07-27 RX ORDER — NICOTINE POLACRILEX 4 MG
15-30 LOZENGE BUCCAL
Status: DISCONTINUED | OUTPATIENT
Start: 2018-07-27 | End: 2018-07-27

## 2018-07-27 RX ORDER — CEFEPIME HYDROCHLORIDE 1 G/1
1 INJECTION, POWDER, FOR SOLUTION INTRAMUSCULAR; INTRAVENOUS EVERY 24 HOURS
Status: DISCONTINUED | OUTPATIENT
Start: 2018-07-27 | End: 2018-08-06 | Stop reason: HOSPADM

## 2018-07-27 RX ORDER — MYCOPHENOLATE MOFETIL 500 MG/1
500 TABLET ORAL
Status: DISCONTINUED | OUTPATIENT
Start: 2018-07-27 | End: 2018-07-27

## 2018-07-27 RX ORDER — TACROLIMUS 1 MG/1
4 CAPSULE ORAL
Status: DISCONTINUED | OUTPATIENT
Start: 2018-07-27 | End: 2018-07-28

## 2018-07-27 RX ORDER — TACROLIMUS 1 MG/1
4 CAPSULE ORAL 2 TIMES DAILY
Status: DISCONTINUED | OUTPATIENT
Start: 2018-07-27 | End: 2018-07-27

## 2018-07-27 RX ORDER — NALOXONE HYDROCHLORIDE 0.4 MG/ML
.1-.4 INJECTION, SOLUTION INTRAMUSCULAR; INTRAVENOUS; SUBCUTANEOUS
Status: DISCONTINUED | OUTPATIENT
Start: 2018-07-27 | End: 2018-08-06 | Stop reason: HOSPADM

## 2018-07-27 RX ADMIN — PREDNISONE 15 MG: 5 TABLET ORAL at 20:51

## 2018-07-27 RX ADMIN — APIXABAN 2.5 MG: 2.5 TABLET, FILM COATED ORAL at 01:10

## 2018-07-27 RX ADMIN — Medication 5 MG: at 08:21

## 2018-07-27 RX ADMIN — ISOSORBIDE DINITRATE 10 MG: 10 TABLET ORAL at 18:50

## 2018-07-27 RX ADMIN — CEFEPIME HYDROCHLORIDE 1 G: 1 INJECTION, POWDER, FOR SOLUTION INTRAMUSCULAR; INTRAVENOUS at 16:47

## 2018-07-27 RX ADMIN — NYSTATIN 1000000 UNITS: 100000 SUSPENSION ORAL at 16:26

## 2018-07-27 RX ADMIN — ASPIRIN 81 MG: 81 TABLET, COATED ORAL at 01:10

## 2018-07-27 RX ADMIN — NYSTATIN 1000000 UNITS: 100000 SUSPENSION ORAL at 21:00

## 2018-07-27 RX ADMIN — ACETAMINOPHEN 650 MG: 325 TABLET, FILM COATED ORAL at 20:52

## 2018-07-27 RX ADMIN — PREDNISONE 15 MG: 5 TABLET ORAL at 01:10

## 2018-07-27 RX ADMIN — APIXABAN 2.5 MG: 2.5 TABLET, FILM COATED ORAL at 08:20

## 2018-07-27 RX ADMIN — INSULIN ASPART 5 UNITS: 100 INJECTION, SOLUTION INTRAVENOUS; SUBCUTANEOUS at 22:46

## 2018-07-27 RX ADMIN — MYCOPHENOLATE MOFETIL 500 MG: 500 TABLET ORAL at 08:20

## 2018-07-27 RX ADMIN — ISOSORBIDE DINITRATE 10 MG: 10 TABLET ORAL at 11:59

## 2018-07-27 RX ADMIN — FLUTICASONE PROPIONATE 2 SPRAY: 50 SPRAY, METERED NASAL at 08:22

## 2018-07-27 RX ADMIN — PANTOPRAZOLE SODIUM 40 MG: 40 TABLET, DELAYED RELEASE ORAL at 08:20

## 2018-07-27 RX ADMIN — ACYCLOVIR SODIUM 400 MG: 1000 INJECTION, SOLUTION INTRAVENOUS at 20:49

## 2018-07-27 RX ADMIN — ASPIRIN 81 MG: 81 TABLET, COATED ORAL at 20:51

## 2018-07-27 RX ADMIN — TACROLIMUS 4 MG: 1 CAPSULE ORAL at 18:50

## 2018-07-27 RX ADMIN — NYSTATIN 1000000 UNITS: 100000 SUSPENSION ORAL at 11:59

## 2018-07-27 RX ADMIN — APIXABAN 2.5 MG: 2.5 TABLET, FILM COATED ORAL at 20:52

## 2018-07-27 RX ADMIN — INSULIN HUMAN 10 UNITS: 100 INJECTION, SUSPENSION SUBCUTANEOUS at 18:51

## 2018-07-27 RX ADMIN — HYDRALAZINE HYDROCHLORIDE 25 MG: 25 TABLET ORAL at 08:20

## 2018-07-27 RX ADMIN — NYSTATIN 1000000 UNITS: 100000 SUSPENSION ORAL at 08:21

## 2018-07-27 RX ADMIN — ASPIRIN 325 MG ORAL TABLET 324 MG: 325 PILL ORAL at 01:11

## 2018-07-27 RX ADMIN — TACROLIMUS 4 MG: 1 CAPSULE ORAL at 01:11

## 2018-07-27 RX ADMIN — TACROLIMUS 4 MG: 1 CAPSULE ORAL at 08:20

## 2018-07-27 RX ADMIN — ALBUTEROL SULFATE 6 PUFF: 90 AEROSOL, METERED RESPIRATORY (INHALATION) at 09:23

## 2018-07-27 RX ADMIN — ROSUVASTATIN CALCIUM 20 MG: 10 TABLET, FILM COATED ORAL at 08:30

## 2018-07-27 RX ADMIN — HYDRALAZINE HYDROCHLORIDE 25 MG: 25 TABLET ORAL at 14:22

## 2018-07-27 RX ADMIN — ISOSORBIDE DINITRATE 10 MG: 10 TABLET ORAL at 08:20

## 2018-07-27 RX ADMIN — HYDRALAZINE HYDROCHLORIDE 25 MG: 25 TABLET ORAL at 20:52

## 2018-07-27 RX ADMIN — PREDNISONE 20 MG: 20 TABLET ORAL at 08:20

## 2018-07-27 RX ADMIN — INSULIN HUMAN 26 UNITS: 100 INJECTION, SUSPENSION SUBCUTANEOUS at 08:21

## 2018-07-27 ASSESSMENT — ACTIVITIES OF DAILY LIVING (ADL)
ADLS_ACUITY_SCORE: 12
ADLS_ACUITY_SCORE: 11
ADLS_ACUITY_SCORE: 12
ADLS_ACUITY_SCORE: 12
ADLS_ACUITY_SCORE: 11
ADLS_ACUITY_SCORE: 11

## 2018-07-27 ASSESSMENT — PAIN DESCRIPTION - DESCRIPTORS: DESCRIPTORS: ACHING

## 2018-07-27 NOTE — PROGRESS NOTES
Update:    Reviewed Tacrolimus level with Dr. Muse and CVICU pharmacist. Will continue Tacrolimus 4 mg BID for now with recheck in the AM. Unclear why the level was so high yesterday given normal levels just days prior. Liver function normal, and getting dialysis regularly. No presumed administration issues per patient.    Sukumar Mejía, CVD Fellow

## 2018-07-27 NOTE — PLAN OF CARE
Per Dr. Mejía and pharmacist, OK to administer tonight's dose tacrolimus. Pt received a dose at 01:11 and again at 08:20 today. Tac lab level (22.5) drawn at 06:24, which did not allow for true trough reading. Tacrolimus level will be drawn before tomorrow's morning dose.

## 2018-07-27 NOTE — NURSING NOTE
Pt seen in clinic with Dr Ernst for 6 week follow up post heart transplant. Pt c/o fever up to 101, ongoing headache, sore throat and mouth, weakness and fatigue. Decreased appetite. States he is not SOB, breathing did appear more labored. Stated he fell following dialysis. MD examined pt's mouth, noted oral ulcer.  Per MD pt to be admitted to hospital. RN and MD escorted pt to ER where pt was subsequently admitted to 6C.

## 2018-07-27 NOTE — PROGRESS NOTES
Admission          7/26/2018  5:13 PM  -----------------------------------------------------------  Reason for admission: Infection/Sepsis  Primary team notified of pt arrival. Cards 2 notified.   Admitted from: ED  Via: stretcher  Accompanied by: slef  Belongings: Placed in closet; valuables kept with patient  Admission Profile: complete  Teaching: orientation to unit and call light- call light within reach, call don't fall, use of console, meal times, when to call for the RN, and enforced importance of safety   Access: Bilateral PIV SL  Telemetry:Placed on pt  Ht./Wt.: complete  2 RN Skin Assessment Completed By: Jennifer RN and Nora RN  Pt status: Stable.     Temp:  [98.5  F (36.9  C)-100.8  F (38.2  C)] 98.8  F (37.1  C)  Pulse:  [] 109  Heart Rate:  [] 93  Resp:  [9-21] 14  BP: ()/(52-99) 120/73  SpO2:  [72 %-100 %] 100 %

## 2018-07-27 NOTE — PLAN OF CARE
Problem: Patient Care Overview  Goal: Plan of Care/Patient Progress Review  Outcome: No Change  Neuro: A&Ox4.   Cardiac: SR. VSS.   Respiratory: Sating 99% on RA.  GI/: Oliguric, needs UA if available. BM X1  Diet/appetite: Tolerating 2gm Na diet. Eating well.  Activity:  Independent in bed and room.  Pain: Denies pain.  Skin: No new deficits noted.    LDA's: Ti for dialysis and PIV x 2 SL'd    Plan: Continue with POC. Notify primary team with changes.    Problem: Diabetes Comorbidity  Goal: Diabetes  Patient comorbidity will be monitored for signs and symptoms of hyperglycemia or hypoglycemia. Problems will be absent, minimized or managed by discharge/transition of care.   Outcome: No Change  Pt on Humulin insulin a.m. And p.m.  today prior to meal.    Problem: Renal Insufficiency Comorbidity  Goal: Renal Insufficiency  Patient comorbidity will be monitored for signs and symptoms of Renal Insufficiency (Chronic) condition.  Problems will be absent, minimized or managed by discharge/transition of care.   Outcome: No Change  Pt continues on his dialysis schedule.
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
## 2018-07-27 NOTE — PROGRESS NOTES
SPIRITUAL HEALTH SERVICES  SPIRITUAL ASSESSMENT Progress Note  [facility] ([place]) 6B     REFERRAL SOURCE: Hospital  Request    Attempted to visit Murray but he was in the middle of appointment and has more scheduled throughout the day.      PLAN: triage for 7/27 on call afternoon or 7/28 on call      Zayda Beard  Chaplain Resident  Pager [phone]

## 2018-07-27 NOTE — PROGRESS NOTES
Post Transplant Patient Social Work Assessment     Patient Name: Murray Nicholson  : 1955  Age: 63 year old  MRN: [id number]  Date of transplant: 18    Patient known to SW team from follow up in the transplant program.  Admitted on 18 for low white blood cell count and mouth ulcers post-transplant.  Seen today to update assessment.      Presenting Information   Living Situation: Lives in an apartment in [place], alone, but Sons have been providing mostly 24-hour support at home post-transplant.  Functional Status: Independent with ADLs-is managing medications on his own. Still not able to drive, however. On dialysis 3 X/week at [facility] in [place] and has family or a friend drive him  Cultural/Language/Spiritual Considerations: None noted    Support System  Primary Support Person Sons  Other support:  Friends  Plan for support in immediate post-hospital period: Back to apartment. Sons help out and are in and out of his apartment    Health Care Directive  Decision Maker: Patient  Alternate Decision Maker: Sons  Health Care Directive: Copy in Chart    Mental Health/Coping:   History of Mental Health: None documented or reported  History of Chemical Health: No current problems with ETOH  Current status: N/A  Coping: Very friendly and talkative. Bright affect and seems to have good coping skills  Services Needed/Recommended: None    Financial   Income: Waiting to hear back from social security, but currently getting disability insurance  Impact of transplant on income: No significant problems noted  Insurance and medication coverage: [employer]  Financial concerns: None mentioned  Resources needed: None identified    Discharge Plan   Patient and family discharge goal: back home to apartment when medically ready  Barriers to discharge: Not medically stable    Education provided by SW: Social Work role inpatient setting (coverage process for usual transplant SW), availability of support  groups    Assessment and recommendations and plan:  Here for treatment of mouth ulcers, work up for infection and treatment for low white blood cell count. SW will remain available if needs arise, but no SW discharge planning needs currently identified. Patient should be able to discharge home when medically ready. He anticipates discharge Monday-Tuesday

## 2018-07-27 NOTE — ED NOTES
[facility], [place]   ED Nurse to Floor Handoff     Murray Nicholson is a 63 year old male who speaks English and lives alone,  in a home  They arrived in the ED by car from home    ED Chief Complaint: Mouth Lesions    ED Dx;   Final diagnoses:   Neutropenic fever (H)   Heart replaced by transplant (H)   Mouth sore         Needed?: No    Allergies:   Allergies   Allergen Reactions     Norco [Hydrocodone-Acetaminophen] Nausea and Vomiting     Cats      Throat tightness     Isosorbide Other (See Comments)     hypotension     Penicillins Hives     Seasonal Allergies      rhinitis     Shrimp      Throat closes    .  Past Medical Hx:   Past Medical History:   Diagnosis Date     (HFpEF) heart failure with preserved ejection fraction (H)      Allergic rhinitis, cause unspecified      Anemia of chronic kidney failure      AS (aortic stenosis)      Ascending aortic aneurysm (H)      Bicuspid aortic valve      CAD (coronary artery disease)      Chronic kidney disease, stage 5 (H)      Congestive heart failure, unspecified      Dyslipidemia      Esophageal reflux      ESRD (end stage renal disease) (H)      Hypersomnia with sleep apnea, unspecified      Hypertension      MGUS (monoclonal gammopathy of unknown significance)      Mitral regurgitation      SHEELA (obstructive sleep apnea)      Systolic heart failure (H)      Type 2 diabetes mellitus (H)       Baseline Mental status: WDL  Current Mental Status changes: at basesline    Infection present or suspected this encounter: yes skin/wound/contact  Sepsis suspected: Yes  Isolation type: No active isolations     Activity level - Baseline/Home:  Independent  Activity Level - Current:   Independent    Bariatric equipment needed?: No    In the ED these meds were given:   Medications   lidocaine 1 % 1 mL (not administered)   lidocaine (LMX4) cream (not administered)   sodium chloride (PF) 0.9% PF flush 3 mL (not administered)   sodium chloride  (PF) 0.9% PF flush 3 mL (not administered)   0.9% sodium chloride BOLUS (not administered)   vancomycin (VANCOCIN) 1,500 mg in sodium chloride 0.9 % 250 mL intermittent infusion (not administered)   vancomycin place bui - receiving intermittent dosing (not administered)   iopamidol (ISOVUE-370) solution 105 mL (not administered)   sodium chloride 0.9 % bag 500mL for CT scan flush use (not administered)   ceFEPIme (MAXIPIME) 1g vial to attach to  ml bag for ADULTS or NS 50 ml bag for PEDS (1 g Intravenous New Bag 7/26/18 1820)       Drips running?  Yes    Home pump  No    Current LDAs  Peripheral IV 07/26/18 Right Upper forearm (Active)   Site Assessment WDL 7/26/2018  5:31 PM   Number of days:0       PICC Double Lumen 04/22/18 Left Basilic (Active)   Number of days:95       CVC Double Lumen 04/17/18 Right Internal jugular (Active)   Number of days:100       Left Radial Interventional Procedure Access (Active)   Number of days:8       Incision/Surgical Site 01/15/18 Left Abdomen (Active)   Number of days:192       Incision/Surgical Site 06/14/18 Chest (Active)   Number of days:42       Labs results:   Labs Ordered and Resulted from Time of ED Arrival Up to the Time of Departure from the ED   COMPREHENSIVE METABOLIC PANEL - Abnormal; Notable for the following:        Result Value    Sodium 129 (*)     Glucose 158 (*)     Creatinine 2.96 (*)     GFR Estimate 22 (*)     GFR Estimate If Black 26 (*)     Calcium 8.4 (*)     Albumin 2.3 (*)     Protein Total 6.1 (*)     All other components within normal limits   CBC WITH PLATELETS DIFFERENTIAL - Abnormal; Notable for the following:     WBC 0.9 (*)     RBC Count 2.46 (*)     Hemoglobin 7.2 (*)     Hematocrit 21.3 (*)     RDW 20.9 (*)     All other components within normal limits   INR - Abnormal; Notable for the following:     INR 1.24 (*)     All other components within normal limits   NT PROBNP INPATIENT - Abnormal; Notable for the following:     N-Terminal Pro  BNP Inpatient >029903 (*)     All other components within normal limits   CRP INFLAMMATION - Abnormal; Notable for the following:     CRP Inflammation 270.0 (*)     All other components within normal limits   ISTAT  GASES LACTATE BOOM POCT - Abnormal; Notable for the following:     Ph Venous 7.46 (*)     PO2 Venous 17 (*)     Bicarbonate Venous 30 (*)     Lactic Acid 3.2 (*)     All other components within normal limits   HSV 1 AND 2 DNA BY PCR - Abnormal; Notable for the following:     HSV Type 1 PCR Canceled, Test credited (*)     HSV Type 2 PCR Canceled, Test credited (*)     All other components within normal limits   TROPONIN I   CMV DNA QUANTIFICATION   TACROLIMUS LEVEL   CRYPTOCOCCUS ANTIGEN   ROUTINE UA WITH MICROSCOPIC   EBV DNA PCR QUANTITATIVE WHOLE BLOOD   PERIPHERAL IV CATHETER   PULSE OXIMETRY NURSING   CARDIAC CONTINUOUS MONITORING   NURSING DRAW AND HOLD   ISTAT CG4 GASES LACTATE BOOM NURSING POCT   ISTAT CG4 GASES LACTATE BOOM NURSING POCT   NURSING DRAW AND HOLD   NURSING DRAW AND HOLD   BLOOD CULTURE   BLOOD CULTURE   HSV 1 AND 2 DNA BY PCR   WOUND CULTURE AEROBIC BACTERIAL   GRAM STAIN   URINE CULTURE AEROBIC BACTERIAL   VARICELLA ZOSTER DNA PCR CSF OR SKIN SWAB   HSV 1 AND 2 DNA BY PCR   VARICELLA ZOSTER VIRUS BY PCR BLOOD OR TISSUE       Imaging Studies:   Recent Results (from the past 24 hour(s))   POC US ECHO LIMITED    Impression    Limited Bedside Cardiac Ultrasound, performed and interpreted by me.   Indication: Hypotension/shock.  Parasternal long axis, parasternal short axis, apical 4 chamber and subcostal views were acquired.   Image quality was satisfactory.    Findings:    Global left ventricular function appears intact.  Chambers do not appear dilated.  There is no evidence of free fluid within the pericardium.    IMPRESSION: Grossly normal left ventricular function and chamber size.  No pericardial effusion..   XR Chest 2 Views    Narrative    1. No acute airspace disease.  2. Stable  "postoperative changes of heart transplant.       Recent vital signs:   /88  Pulse 109  Temp 100.1  F (37.8  C) (Oral)  Resp 21  Ht 1.753 m (5' 9\")  Wt 77.7 kg (171 lb 4.8 oz)  SpO2 99%  BMI 25.3 kg/m2    Cardiac Rhythm: Tachycardia  Pt needs tele? Yes  Skin/wound Issues: sore noted in mouth     Code Status: Full Code    Pain control: pt had none    Nausea control: pt had none    Abnormal labs/tests/findings requiring intervention: none    Family present during ED course? No   Family Comments/Social Situation comments: none    Tasks needing completion: None    Paulina Dove, RN    [phone] [facility]    "

## 2018-07-27 NOTE — CONSULTS
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   07/18/2018 EBV DNA Not Detected EBVNEG^EBV DNA Not Detected [Copies]/mL Final       Adenovirus Testing  Recent Labs   Lab Test  06/28/18   2148   ADENOVIRUSAG  Negative       Hepatitis B Testing   Recent Labs   Lab Test  07/18/18   0841  06/14/18   0705  04/16/18   1546   AUSAB   --   66.74*  28.18*   HBCAB  Nonreactive   --   Nonreactive   HEPBANG  Nonreactive  Nonreactive  Nonreactive      Hepatitis C Antibody   Date Value Ref Range Status   07/18/2018 Nonreactive NR^Nonreactive Final     Comment:     Assay performance characteristics have not been established for newborns,   infants, and children     04/16/2018 Nonreactive NR^Nonreactive Final     Comment:     Assay performance characteristics have not been established for newborns,   infants, and children         CMV Antibody IgG   Date Value Ref Range Status   06/14/2018 0.2 0.0 - 0.8 AI Final     Comment:     Negative  Antibody index (AI) values reflect qualitative changes in antibody   concentration that cannot be directly associated with clinical condition or   disease state.     04/16/2018 <0.2 0.0 - 0.8 AI Final     Comment:     Negative  Antibody index (AI) values reflect qualitative changes in antibody    concentration that cannot be directly associated with clinical condition or   disease state.     08/10/2015 0.2 0.0 - 0.8 AI Final     Comment:     Negative   Antibody index (AI) values reflect qualitative changes in antibody   concentration that cannot be directly associated with clinical condition or   disease state.       Varicella Zoster Virus Antibody IgG   Date Value Ref Range Status   04/16/2018 5.7 (H) 0.0 - 0.8 AI Final     Comment:     Positive, suggests prev. exposure and probable immunity  Antibody index (AI) values reflect qualitative changes in antibody   concentration that cannot be directly associated with clinical condition or   disease state.     08/10/2015 (H) 0.0 - 0.8 AI Final    >8.0  Positive, suggests prev. exposure and probable immunity   Antibody index (AI) values reflect qualitative changes in antibody   concentration that cannot be directly associated with clinical condition or   disease state.       EBV Capsid Antibody IgG   Date Value Ref Range Status   06/14/2018 >8.0 (H) 0.0 - 0.8 AI Final     Comment:     Positive, suggests recent or past exposure  Antibody index (AI) values reflect qualitative changes in antibody   concentration that cannot be directly associated with clinical condition or   disease state.     04/16/2018 7.1 (H) 0.0 - 0.8 AI Final     Comment:     Positive, suggests recent or past exposure  Antibody index (AI) values reflect qualitative changes in antibody   concentration that cannot be directly associated with clinical condition or   disease state.     08/10/2015 7.2 (H) 0.0 - 0.8 AI Final     Comment:     Positive, suggests recent or past exposure   Antibody index (AI) values reflect qualitative changes in antibody   concentration that cannot be directly associated with clinical condition or   disease state.       Toxoplasma Antibody IgG   Date Value Ref Range Status   04/16/2018 <3.0 0.0 - 7.1 IU/mL Final     Comment:     Negative- Absence of detectable  Toxoplasma gondii IgG antibodies. A negative   result does not rule out acute infection.  The magnitude of the measured result is not indicative of the amount of   antibody present. The concentrations of anti-Toxoplasma gondii IgG in a given   specimen determined with assays from different manufacturers can vary due to   differences in assay methods and reagent specificity.       Herpes Simplex Virus Type 1 IgG   Date Value Ref Range Status   04/16/2018 0.3 0.0 - 0.8 AI Final     Comment:     No HSV-1 IgG antibodies detected.  Antibody index (AI) values reflect qualitative changes in antibody   concentration that cannot be directly associated with clinical condition or   disease state.       Herpes Simplex Virus Type 2 IgG   Date Value Ref Range Status   04/16/2018 >8.0 (H) 0.0 - 0.8 AI Final     Comment:     Positive.  IgG antibody to HSV-2 detected.  Antibody index (AI) values reflect qualitative changes in antibody   concentration that cannot be directly associated with clinical condition or   disease state.         Imaging:  Recent Results (from the past 48 hour(s))   POC US ECHO LIMITED    Impression    Limited Bedside Cardiac Ultrasound, performed and interpreted by me.   Indication: Hypotension/shock.  Parasternal long axis, parasternal short axis, apical 4 chamber and subcostal views were acquired.   Image quality was satisfactory.    Findings:    Global left ventricular function appears intact.  Chambers do not appear dilated.  There is no evidence of free fluid within the pericardium.    IMPRESSION: Grossly normal left ventricular function and chamber size.  No pericardial effusion..   XR Chest 2 Views    Narrative    Exam:  XR CHEST 2 VW, 7/26/2018 6:12 PM    History: fever, s/p heart transplant;     Comparison:  Chest radiograph 7/20/2018.    Findings:  PA and lateral views chest. Tunneled right IJ central  venous catheter tip projects over the high right atrium. Median  sternotomy wires and mediastinal  surgical clips compatible with heart  transportation. Cardiac silhouette is stable. No pleural effusion or  pneumothorax. No focal airspace opacities. Visualized upper abdomen is  unremarkable. Mild degenerative change in the thoracic spine.      Impression    Impression:    1. No acute airspace disease.  2. Stable postoperative changes of heart transplant.    I have personally reviewed the examination and initial interpretation  and I agree with the findings.    BAILEY JEFFERSON MD   CT Head w/o Contrast    Narrative    CT HEAD W/O CONTRAST 7/26/2018 8:34 PM    Provided History: eval sinus / head for possible abscess? headahces in  immunocompromised patient, heart transplant, ESRD;     Comparison: Head CT 4/16/2018.    Technique: Using multidetector thin collimation helical acquisition  technique, axial, coronal and sagittal CT images from the skull base  to the vertex were obtained without intravenous contrast.     Findings:    No intracranial hemorrhage, mass effect, midline shift, or abnormal  extra-axial fluid collection. Chronic small regions of  encephalomalacia in the left occipital lobe enhancement and the  anteroinferior right frontal lobe. No acute loss of gray-white  differentiation. Scattered subcortical and periventricular white  matter hypoattenuation is nonspecific, including new hypodensity in  the subcortical white matter of the left parietal lobe.    Calvarium and visualized facial bones are intact. Mild to moderate  mucosal thickening in the right maxillary sinus, similar to 4/16/2018.  Mastoid air cells are clear.      Impression    Impression:   1. No acute intracranial pathology.  2. No evidence of acute sinusitis.  3. No CT evidence of abscess.    I have personally reviewed the examination and initial interpretation  and I agree with the findings.    CULLEN CROW MD   CT Chest/Abdomen/Pelvis w Contrast    Narrative    1. Findings suggestive of infectious or inflammatory colitis of  the  ascending colon just proximal to the cecum. No pericolonic fluid  collection.  2. Postoperative changes of cardiac transplant with no significant  change in loculated retrosternal fluid and soft tissue stranding  compared to 6/26/2018.  3. 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

## 2018-07-27 NOTE — PROGRESS NOTES
Advanced Heart Failure and Transplant Cardiology  History and Physical    Assessment and Plan:   Mr. Nicholson is a 63 year old male who is s/p orthotopic heart transplant on 6/14/18 who presents to clinic for new heart transplant visit. Post-op course was complicated leukocytosis for which he received abx, RIJ thrombus infected with CoNS, an incidental pneumoperitoneum. He presents now with leukopenic fever and new mouth ulcer concerning for HSV or other viral infection.    Overall, we are concerned for HSV and / or CMV (or both together) infection(s). His ulcer could be HSV or CMV (vs other?) and his mild small bowel colitis could be evidence of CMV infection / viremia (although this seems subclinical given complete lack of abdominal symptoms). Obviously, he has been off CMV prophylaxis given leukopenia and he is high risk (D+ R-). Of note, pre-transplant labs show (+) IgG to EBV, HSV-2, and VZV, thus making any of these possible. Transplant ID currently very helpful, and will help us more also once serum / ulcer swab PCR's result. Will hold Cellcept given severe leukopenia (with low neutrophils / lymphocytes), and will reduce Tacrolimus goal for target level closer to 8 (vs 10-12). The Tacrolimus level last evening and this morning were both very high (last night's level was true trough). Will hold this PM and tomorrow AM's dose and recheck level tomorrow.    Will continue with cefepime for neutropenic fevers, vancomycin (until MRSA turns negative), and IV acyclovir at low dose for empiric HSV treatment (may need high dose if CMV confirmed, or alternative therapy). We will know more probably by late Saturday / Sunday of viral identification (if any). There is currently no indication for G-CSF, but if his infection worsens in the context of continued worsening WBC, will then re-consider. Currently, he is clinically improving very quickly on current therapy.    # Leukopenic / Neutropenic Fever:  # Large  "necrotic mouth ulcer:  # Mild prececal colitis:  - Ulcer Swab: HSV and VZV PCR pending  - Serum: HSV, VZV, CMV, EBV serum PCR's pending  - empiric cefepime IV, vancomycin IV  - empiric acyclovir 5 mg/kg IV q8 hours  - transplant ID following  - hold off on G-CSF for the time being    # Status post OHT on 6/14/18, history of NICM  # Donor 3 vessel CAD  # Chronic immunosuppression  - Rejection history: none  - hold Cellcept  - Tacrolimus 4 mg BID, will hold this evening dose and tomorrow AM (goal was 10-12, will target 8 for now)  - Prednisone 20 mg / 15 mg  - Next Biopsy: 8/1/18  Prophylaxis:  - PCP: off Bactrim due to leukopenia, pentamidine 7/20/18 and every 28 days  - Thrush: Nystatin   - PPI: pantoprazole 40 mg  - CAV: ASA 81 mg, Crestor  - CMV (D+ / R-): holding Valcyte with leukopenia    Serostatus: CMV: D+/R-. EBV: D+/R pending    # RIJ Thrombus with CoNS  - continue Eliquis    # ESRD   - listed for renal transplant, EDW 76 kg  - ESRD consult (due Saturday)    Sukumar Mejía MD  Cardiology Fellow    Discussed with Dr. Muse.  Subjective:   - \"Feel like a new man today\"  - no headache, energy improved, appetite recovered  - ate full breakfast  - energy much improved     Review of Systems:   A comprehensive review of systems was performed and found to be negative except as described in this note     Medications:     Current Facility-Administered Medications   Medication     acetaminophen (TYLENOL) tablet 650 mg     acyclovir (ZOVIRAX) 400 mg in D5W 100 mL intermittent infusion     albuterol (PROAIR HFA/PROVENTIL HFA/VENTOLIN HFA) Inhaler 6 puff     alum & mag hydroxide-simethicone (MYLANTA ES/MAALOX  ES) suspension 30 mL     apixaban ANTICOAGULANT (ELIQUIS) tablet 2.5 mg     aspirin EC tablet 81 mg     biotin tablet TABS 5 mg     ceFEPIme (MAXIPIME) 1g vial to attach to  ml bag for ADULTS or NS 50 ml bag for PEDS     glucose gel 15-30 g    Or     dextrose 50 % injection 25-50 mL    Or     glucagon injection " "1 mg     fluticasone (FLONASE) 50 MCG/ACT spray 1-2 spray     hydrALAZINE (APRESOLINE) tablet 25 mg     insulin isophane human (HumuLIN N PEN) injection 10 Units     insulin isophane human (HumuLIN N PEN) injection 26 Units     isosorbide dinitrate (ISORDIL) tablet 10 mg     lidocaine (LMX4) cream     lidocaine (LMX4) cream     lidocaine 1 % 1 mL     lidocaine 1 % 1 mL     medication instruction     melatonin tablet 2 mg     naloxone (NARCAN) injection 0.1-0.4 mg     nystatin (MYCOSTATIN) suspension 1,000,000 Units     pantoprazole (PROTONIX) EC tablet 40 mg     predniSONE (DELTASONE) tablet 15 mg     predniSONE (DELTASONE) tablet 20 mg     rosuvastatin (CRESTOR) tablet 20 mg     sodium chloride (PF) 0.9% PF flush 3 mL     sodium chloride (PF) 0.9% PF flush 3 mL     sodium chloride (PF) 0.9% PF flush 3 mL     sodium chloride (PF) 0.9% PF flush 3 mL     tacrolimus (GENERIC EQUIVALENT) capsule 4 mg     traMADol (ULTRAM) tablet 50 mg     vancomycin place bui - receiving intermittent dosing       Objective:   Blood pressure 109/78, pulse 98, temperature 98.1  F (36.7  C), temperature source Oral, resp. rate 16, height 1.753 m (5' 9\"), weight 77.9 kg (171 lb 11.2 oz), SpO2 99 %.  GENERAL: comfortable, NAD, A&O x 3, energy much improved  HEENT: Eye symmetrical, no discharge or icterus bilaterally. Upper hard palate with 4-5 cm white ulcer with necrotic base  CV: Tachycardic, +S1S2, 2+ ejection murmur, rub, or gallop. JVP just above clavicle at  45 degrees. R-upper chest   RESPIRATORY: Respirations regular, even, and unlabored. Lungs CTA throughout.   GI: Soft and non distended with normoactive bowel sounds present in all quadrants. No tenderness, rebound, guarding. No hepatomegaly.   EXTREMITIES: No peripheral edema. 2+ bilateral pedal pulses.   NEUROLOGIC: Alert and oriented x 3. No focal deficits.   MUSCULOSKELETAL: No joint swelling or tenderness.   SKIN: No jaundice. No rashes or lesions.     Data:   - reviewed all " labs and imaging    I have reviewed today's vital signs, notes, medications, labs and imaging.  I have also seen and examined the patient and agree with the findings and plan as outlined above.  Pt well known to me and has hx of OHT one month ago now admitted with neutropenic fevers.  VSS with No new complaints.  Lungs clear and tachy S1 and S2 with no gallop.  Abd is benign.  Labs with Hgb 6.1 to 7.2 and WBC 1.0.  Assessment: Pt with recent OHT on pred/tac with neutropenic fevers concerning for viral infection (pt on acyclovir), ESRD on HD.  Plan as follows:   1) Cont antiviral agents  2) Appreciate ID input  3) HD for ESRD  4) Hold cellcept today in pt with severe leukopenia.    Bobby Muse MD, PhD  Professor, Heart Failure and Cardiac Transplantation  [facility]

## 2018-07-28 LAB
ALBUMIN UR-MCNC: 30 MG/DL
ANION GAP SERPL CALCULATED.3IONS-SCNC: 14 MMOL/L (ref 3–14)
APPEARANCE UR: ABNORMAL
BASOPHILS # BLD AUTO: 0 10E9/L (ref 0–0.2)
BASOPHILS NFR BLD AUTO: 0 %
BILIRUB UR QL STRIP: NEGATIVE
BLD PROD TYP BPU: NORMAL
BLD UNIT ID BPU: 0
BLOOD PRODUCT CODE: NORMAL
BPU ID: NORMAL
BUN SERPL-MCNC: 63 MG/DL (ref 7–30)
CALCIUM SERPL-MCNC: 8.3 MG/DL (ref 8.5–10.1)
CHLORIDE SERPL-SCNC: 93 MMOL/L (ref 94–109)
CO2 SERPL-SCNC: 18 MMOL/L (ref 20–32)
COLOR UR AUTO: YELLOW
CREAT SERPL-MCNC: 6.17 MG/DL (ref 0.66–1.25)
DIFFERENTIAL METHOD BLD: ABNORMAL
EOSINOPHIL # BLD AUTO: 0 10E9/L (ref 0–0.7)
EOSINOPHIL NFR BLD AUTO: 0 %
ERYTHROCYTE [DISTWIDTH] IN BLOOD BY AUTOMATED COUNT: 20.3 % (ref 10–15)
GFR SERPL CREATININE-BSD FRML MDRD: 9 ML/MIN/1.7M2
GLUCOSE BLDC GLUCOMTR-MCNC: 278 MG/DL (ref 70–99)
GLUCOSE BLDC GLUCOMTR-MCNC: 279 MG/DL (ref 70–99)
GLUCOSE BLDC GLUCOMTR-MCNC: 329 MG/DL (ref 70–99)
GLUCOSE BLDC GLUCOMTR-MCNC: 331 MG/DL (ref 70–99)
GLUCOSE BLDC GLUCOMTR-MCNC: 334 MG/DL (ref 70–99)
GLUCOSE BLDC GLUCOMTR-MCNC: 381 MG/DL (ref 70–99)
GLUCOSE BLDC GLUCOMTR-MCNC: 459 MG/DL (ref 70–99)
GLUCOSE SERPL-MCNC: 309 MG/DL (ref 70–99)
GLUCOSE UR STRIP-MCNC: 300 MG/DL
HCT VFR BLD AUTO: 20.1 % (ref 40–53)
HGB BLD-MCNC: 6.6 G/DL (ref 13.3–17.7)
HGB BLD-MCNC: 6.7 G/DL (ref 13.3–17.7)
HGB UR QL STRIP: ABNORMAL
IMM GRANULOCYTES # BLD: 0 10E9/L (ref 0–0.4)
IMM GRANULOCYTES NFR BLD: 1.1 %
KETONES UR STRIP-MCNC: NEGATIVE MG/DL
LEUKOCYTE ESTERASE UR QL STRIP: NEGATIVE
LYMPHOCYTES # BLD AUTO: 0.2 10E9/L (ref 0.8–5.3)
LYMPHOCYTES NFR BLD AUTO: 21.7 %
MAGNESIUM SERPL-MCNC: 1.7 MG/DL (ref 1.6–2.3)
MCH RBC QN AUTO: 29 PG (ref 26.5–33)
MCHC RBC AUTO-ENTMCNC: 33.3 G/DL (ref 31.5–36.5)
MCV RBC AUTO: 87 FL (ref 78–100)
MONOCYTES # BLD AUTO: 0.2 10E9/L (ref 0–1.3)
MONOCYTES NFR BLD AUTO: 18.5 %
NEUTROPHILS # BLD AUTO: 0.5 10E9/L (ref 1.6–8.3)
NEUTROPHILS NFR BLD AUTO: 58.7 %
NITRATE UR QL: NEGATIVE
NRBC # BLD AUTO: 0 10*3/UL
NRBC BLD AUTO-RTO: 0 /100
PH UR STRIP: 5.5 PH (ref 5–7)
PLATELET # BLD AUTO: 239 10E9/L (ref 150–450)
PLATELET # BLD EST: ABNORMAL 10*3/UL
POTASSIUM SERPL-SCNC: 4.3 MMOL/L (ref 3.4–5.3)
RBC # BLD AUTO: 2.31 10E12/L (ref 4.4–5.9)
RBC #/AREA URNS AUTO: 3 /HPF (ref 0–2)
RETICS # AUTO: 34.4 10E9/L (ref 25–95)
RETICS/RBC NFR AUTO: 1.5 % (ref 0.5–2)
SODIUM SERPL-SCNC: 125 MMOL/L (ref 133–144)
SOURCE: ABNORMAL
SP GR UR STRIP: 1.02 (ref 1–1.03)
SQUAMOUS #/AREA URNS AUTO: 1 /HPF (ref 0–1)
TACROLIMUS BLD-MCNC: 24.7 UG/L (ref 5–15)
TME LAST DOSE: ABNORMAL H
TRANS CELLS #/AREA URNS HPF: 1 /HPF (ref 0–1)
TRANSFUSION STATUS PATIENT QL: NORMAL
TRANSFUSION STATUS PATIENT QL: NORMAL
UROBILINOGEN UR STRIP-MCNC: NORMAL MG/DL (ref 0–2)
VANCOMYCIN SERPL-MCNC: 15.4 MG/L
WBC # BLD AUTO: 0.9 10E9/L (ref 4–11)
WBC #/AREA URNS AUTO: 5 /HPF (ref 0–5)

## 2018-07-28 PROCEDURE — 25000131 ZZH RX MED GY IP 250 OP 636 PS 637: Mod: GY | Performed by: STUDENT IN AN ORGANIZED HEALTH CARE EDUCATION/TRAINING PROGRAM

## 2018-07-28 PROCEDURE — 83735 ASSAY OF MAGNESIUM: CPT | Performed by: STUDENT IN AN ORGANIZED HEALTH CARE EDUCATION/TRAINING PROGRAM

## 2018-07-28 PROCEDURE — 85018 HEMOGLOBIN: CPT | Performed by: INTERNAL MEDICINE

## 2018-07-28 PROCEDURE — A9270 NON-COVERED ITEM OR SERVICE: HCPCS | Mod: GY | Performed by: STUDENT IN AN ORGANIZED HEALTH CARE EDUCATION/TRAINING PROGRAM

## 2018-07-28 PROCEDURE — 25000128 H RX IP 250 OP 636: Performed by: STUDENT IN AN ORGANIZED HEALTH CARE EDUCATION/TRAINING PROGRAM

## 2018-07-28 PROCEDURE — 85025 COMPLETE CBC W/AUTO DIFF WBC: CPT | Performed by: STUDENT IN AN ORGANIZED HEALTH CARE EDUCATION/TRAINING PROGRAM

## 2018-07-28 PROCEDURE — 83010 ASSAY OF HAPTOGLOBIN QUANT: CPT | Performed by: INTERNAL MEDICINE

## 2018-07-28 PROCEDURE — 90937 HEMODIALYSIS REPEATED EVAL: CPT

## 2018-07-28 PROCEDURE — 99233 SBSQ HOSP IP/OBS HIGH 50: CPT | Mod: GC | Performed by: INTERNAL MEDICINE

## 2018-07-28 PROCEDURE — 36415 COLL VENOUS BLD VENIPUNCTURE: CPT | Performed by: INTERNAL MEDICINE

## 2018-07-28 PROCEDURE — 25000128 H RX IP 250 OP 636: Performed by: INTERNAL MEDICINE

## 2018-07-28 PROCEDURE — 25000125 ZZHC RX 250: Performed by: STUDENT IN AN ORGANIZED HEALTH CARE EDUCATION/TRAINING PROGRAM

## 2018-07-28 PROCEDURE — 12000006 ZZH R&B IMCU INTERMEDIATE UMMC

## 2018-07-28 PROCEDURE — 80197 ASSAY OF TACROLIMUS: CPT | Performed by: STUDENT IN AN ORGANIZED HEALTH CARE EDUCATION/TRAINING PROGRAM

## 2018-07-28 PROCEDURE — 00000146 ZZHCL STATISTIC GLUCOSE BY METER IP

## 2018-07-28 PROCEDURE — 5A1D70Z PERFORMANCE OF URINARY FILTRATION, INTERMITTENT, LESS THAN 6 HOURS PER DAY: ICD-10-PCS | Performed by: INTERNAL MEDICINE

## 2018-07-28 PROCEDURE — 81001 URINALYSIS AUTO W/SCOPE: CPT | Performed by: EMERGENCY MEDICINE

## 2018-07-28 PROCEDURE — 87040 BLOOD CULTURE FOR BACTERIA: CPT | Performed by: INTERNAL MEDICINE

## 2018-07-28 PROCEDURE — 25000132 ZZH RX MED GY IP 250 OP 250 PS 637: Mod: GY | Performed by: INTERNAL MEDICINE

## 2018-07-28 PROCEDURE — 87086 URINE CULTURE/COLONY COUNT: CPT | Performed by: EMERGENCY MEDICINE

## 2018-07-28 PROCEDURE — 25000132 ZZH RX MED GY IP 250 OP 250 PS 637: Mod: GY | Performed by: STUDENT IN AN ORGANIZED HEALTH CARE EDUCATION/TRAINING PROGRAM

## 2018-07-28 PROCEDURE — 80048 BASIC METABOLIC PNL TOTAL CA: CPT | Performed by: STUDENT IN AN ORGANIZED HEALTH CARE EDUCATION/TRAINING PROGRAM

## 2018-07-28 PROCEDURE — 36415 COLL VENOUS BLD VENIPUNCTURE: CPT | Performed by: STUDENT IN AN ORGANIZED HEALTH CARE EDUCATION/TRAINING PROGRAM

## 2018-07-28 PROCEDURE — 63400005 ZZH RX 634: Performed by: INTERNAL MEDICINE

## 2018-07-28 PROCEDURE — 85045 AUTOMATED RETICULOCYTE COUNT: CPT | Performed by: INTERNAL MEDICINE

## 2018-07-28 PROCEDURE — 80202 ASSAY OF VANCOMYCIN: CPT | Performed by: STUDENT IN AN ORGANIZED HEALTH CARE EDUCATION/TRAINING PROGRAM

## 2018-07-28 PROCEDURE — P9016 RBC LEUKOCYTES REDUCED: HCPCS | Performed by: INTERNAL MEDICINE

## 2018-07-28 RX ORDER — HEPARIN SODIUM 1000 [USP'U]/ML
500 INJECTION, SOLUTION INTRAVENOUS; SUBCUTANEOUS
Status: COMPLETED | OUTPATIENT
Start: 2018-07-28 | End: 2018-07-28

## 2018-07-28 RX ORDER — TACROLIMUS 1 MG/1
4 CAPSULE ORAL
Status: DISCONTINUED | OUTPATIENT
Start: 2018-07-28 | End: 2018-07-31

## 2018-07-28 RX ORDER — HEPARIN SODIUM 1000 [USP'U]/ML
500 INJECTION, SOLUTION INTRAVENOUS; SUBCUTANEOUS CONTINUOUS
Status: DISCONTINUED | OUTPATIENT
Start: 2018-07-28 | End: 2018-07-28

## 2018-07-28 RX ORDER — LIDOCAINE 4 G/G
1 PATCH TOPICAL
Status: DISCONTINUED | OUTPATIENT
Start: 2018-07-28 | End: 2018-08-06 | Stop reason: HOSPADM

## 2018-07-28 RX ORDER — CEFAZOLIN SODIUM 1 G/50ML
1250 SOLUTION INTRAVENOUS ONCE
Status: COMPLETED | OUTPATIENT
Start: 2018-07-28 | End: 2018-07-28

## 2018-07-28 RX ADMIN — HEPARIN SODIUM 300 UNITS: 1000 INJECTION, SOLUTION INTRAVENOUS; SUBCUTANEOUS at 12:17

## 2018-07-28 RX ADMIN — NYSTATIN 1000000 UNITS: 100000 SUSPENSION ORAL at 16:30

## 2018-07-28 RX ADMIN — INSULIN ASPART 3 UNITS: 100 INJECTION, SOLUTION INTRAVENOUS; SUBCUTANEOUS at 17:56

## 2018-07-28 RX ADMIN — NYSTATIN 1000000 UNITS: 100000 SUSPENSION ORAL at 07:29

## 2018-07-28 RX ADMIN — ROSUVASTATIN CALCIUM 20 MG: 10 TABLET, FILM COATED ORAL at 07:28

## 2018-07-28 RX ADMIN — Medication 5 MG: at 07:28

## 2018-07-28 RX ADMIN — APIXABAN 2.5 MG: 2.5 TABLET, FILM COATED ORAL at 20:22

## 2018-07-28 RX ADMIN — HYDRALAZINE HYDROCHLORIDE 25 MG: 25 TABLET ORAL at 20:22

## 2018-07-28 RX ADMIN — NYSTATIN 1000000 UNITS: 100000 SUSPENSION ORAL at 12:40

## 2018-07-28 RX ADMIN — HYDRALAZINE HYDROCHLORIDE 25 MG: 25 TABLET ORAL at 14:54

## 2018-07-28 RX ADMIN — EPOETIN ALFA 7600 UNITS: 10000 SOLUTION INTRAVENOUS; SUBCUTANEOUS at 09:48

## 2018-07-28 RX ADMIN — ACETAMINOPHEN 650 MG: 325 TABLET, FILM COATED ORAL at 12:40

## 2018-07-28 RX ADMIN — ISOSORBIDE DINITRATE 10 MG: 10 TABLET ORAL at 17:48

## 2018-07-28 RX ADMIN — HEPARIN SODIUM 500 UNITS: 1000 INJECTION, SOLUTION INTRAVENOUS; SUBCUTANEOUS at 08:58

## 2018-07-28 RX ADMIN — CEFEPIME HYDROCHLORIDE 1 G: 1 INJECTION, POWDER, FOR SOLUTION INTRAMUSCULAR; INTRAVENOUS at 17:48

## 2018-07-28 RX ADMIN — APIXABAN 2.5 MG: 2.5 TABLET, FILM COATED ORAL at 07:28

## 2018-07-28 RX ADMIN — NYSTATIN 1000000 UNITS: 100000 SUSPENSION ORAL at 20:21

## 2018-07-28 RX ADMIN — ACYCLOVIR SODIUM 400 MG: 1000 INJECTION, SOLUTION INTRAVENOUS at 20:22

## 2018-07-28 RX ADMIN — ACETAMINOPHEN 650 MG: 325 TABLET, FILM COATED ORAL at 01:27

## 2018-07-28 RX ADMIN — INSULIN ASPART 4 UNITS: 100 INJECTION, SOLUTION INTRAVENOUS; SUBCUTANEOUS at 12:41

## 2018-07-28 RX ADMIN — TACROLIMUS 4 MG: 1 CAPSULE ORAL at 07:28

## 2018-07-28 RX ADMIN — ISOSORBIDE DINITRATE 10 MG: 10 TABLET ORAL at 12:40

## 2018-07-28 RX ADMIN — HEPARIN SODIUM 500 UNITS/HR: 1000 INJECTION, SOLUTION INTRAVENOUS; SUBCUTANEOUS at 08:58

## 2018-07-28 RX ADMIN — INSULIN HUMAN 26 UNITS: 100 INJECTION, SUSPENSION SUBCUTANEOUS at 07:33

## 2018-07-28 RX ADMIN — VANCOMYCIN HYDROCHLORIDE 1250 MG: 10 INJECTION, POWDER, LYOPHILIZED, FOR SOLUTION INTRAVENOUS at 14:54

## 2018-07-28 RX ADMIN — TRAMADOL HYDROCHLORIDE 50 MG: 50 TABLET, COATED ORAL at 15:48

## 2018-07-28 RX ADMIN — ASPIRIN 81 MG: 81 TABLET, COATED ORAL at 20:22

## 2018-07-28 RX ADMIN — LIDOCAINE 1 PATCH: 560 PATCH PERCUTANEOUS; TOPICAL; TRANSDERMAL at 20:22

## 2018-07-28 RX ADMIN — PREDNISONE 20 MG: 20 TABLET ORAL at 07:29

## 2018-07-28 RX ADMIN — ACETAMINOPHEN 650 MG: 325 TABLET, FILM COATED ORAL at 19:28

## 2018-07-28 RX ADMIN — FLUTICASONE PROPIONATE 2 SPRAY: 50 SPRAY, METERED NASAL at 07:46

## 2018-07-28 RX ADMIN — INSULIN ASPART 4 UNITS: 100 INJECTION, SOLUTION INTRAVENOUS; SUBCUTANEOUS at 07:42

## 2018-07-28 RX ADMIN — SODIUM CHLORIDE 300 ML: 9 INJECTION, SOLUTION INTRAVENOUS at 08:57

## 2018-07-28 RX ADMIN — PANTOPRAZOLE SODIUM 40 MG: 40 TABLET, DELAYED RELEASE ORAL at 07:28

## 2018-07-28 RX ADMIN — ALBUTEROL SULFATE 6 PUFF: 90 AEROSOL, METERED RESPIRATORY (INHALATION) at 07:46

## 2018-07-28 RX ADMIN — SODIUM CHLORIDE 250 ML: 9 INJECTION, SOLUTION INTRAVENOUS at 08:58

## 2018-07-28 RX ADMIN — INSULIN ASPART 3 UNITS: 100 INJECTION, SOLUTION INTRAVENOUS; SUBCUTANEOUS at 21:51

## 2018-07-28 RX ADMIN — PREDNISONE 15 MG: 5 TABLET ORAL at 20:22

## 2018-07-28 ASSESSMENT — PAIN DESCRIPTION - DESCRIPTORS
DESCRIPTORS: ACHING
DESCRIPTORS: HEADACHE
DESCRIPTORS: HEADACHE
DESCRIPTORS: ACHING
DESCRIPTORS: HEADACHE

## 2018-07-28 ASSESSMENT — ACTIVITIES OF DAILY LIVING (ADL)
ADLS_ACUITY_SCORE: 11

## 2018-07-28 NOTE — PLAN OF CARE
This nurse cared for patient from [id number]. AVSS HR 90s. Denies pain, denies nausea. Oliguria, no urine output during this time period. If patient has output please send UA specimen.  Patient has only BID humulin orders, no sliding scale or carb coverage orders. BG at [phone], recheck 485. Patient had a large coffee with cream and sugar at about 1530, sipped on it for about the next 2 hrs. Discussed with MD, watch for new orders.  See previous note regarding tacrolimus.

## 2018-07-28 NOTE — PLAN OF CARE
"Problem: Patient Care Overview  Goal: Plan of Care/Patient Progress Review  Outcome: No Change  /90 (BP Location: Left arm)  Pulse 98  Temp 97.9  F (36.6  C) (Oral)  Resp 16  Ht 1.753 m (5' 9\")  Wt 77.9 kg (171 lb 11.2 oz)  SpO2 100%  BMI 25.36 kg/m2  Neuro: A&Ox4.   Cardiac: SR/ST. VSS.   Respiratory: Sating in mid-high 90's on RA.  GI/: Oliguric. On scheduled HD. BM x1 overnight. UA sent late shift.  Diet/appetite: Regular diet. Eating, drinking well. Tolerating meds.  Activity:  Independent  Pain: Persistent headache with some relief from tylenol.   Skin: No new deficits noted.    Plan: Continue with POC. Notify primary team with changes.        Problem: Diabetes Comorbidity  Goal: Diabetes  Patient comorbidity will be monitored for signs and symptoms of hyperglycemia or hypoglycemia. Problems will be absent, minimized or managed by discharge/transition of care.   Outcome: No Change  Pt on prednisone. BG's high during shift and treated per MD order. Some improvement noted on last check. BG now in 300's down from 400's earlier.     Problem: Renal Insufficiency Comorbidity  Goal: Renal Insufficiency  Patient comorbidity will be monitored for signs and symptoms of Renal Insufficiency (Chronic) condition.  Problems will be absent, minimized or managed by discharge/transition of care.   Outcome: No Change  Oliguric. On Tues, Thurs, Sat HD schedule.       "

## 2018-07-28 NOTE — PROGRESS NOTES
Advanced Heart Failure and Transplant Cardiology  History and Physical    Assessment and Plan:   Mr. Nicholson is a 63 year old male who is s/p orthotopic heart transplant on 6/14/18 who presents to clinic for new heart transplant visit. Post-op course was complicated leukocytosis for which he received abx, RIJ thrombus infected with CoNS, an incidental pneumoperitoneum. He presents now with leukopenic fever and new mouth ulcer concerning for HSV or other viral infection.    Overall, we are concerned for HSV and / or CMV (or both together) infection(s). His ulcer could be HSV or CMV (vs other?) and his mild small bowel colitis could be evidence of CMV infection / viremia (although this seems subclinical given complete lack of abdominal symptoms). Obviously, he has been off CMV prophylaxis given leukopenia and he is high risk (D+ R-). Of note, pre-transplant labs show (+) IgG to EBV, HSV-2, and VZV, thus making any of these possible. Transplant ID currently very helpful, and will help us more also once serum / ulcer swab PCR's result. Will hold Cellcept given severe leukopenia (with low neutrophils / lymphocytes), and will reduce Tacrolimus goal for target level closer to 8 (vs 10-12). His Tacrolimus goal is 8 and his level is 22. We will hold doses tonight and tomorrow and reassess daily.     He has one bottle with GNR from his dialysis unit. Will continue cefepime. Keep vancomycin until 48 hours of culture negativity. Continue IV acyclovir for empiric HSV treatment. CMV is pending.     # Leukopenic / Neutropenic Fever:  # GNR Bacteremia   # Large necrotic mouth ulcer:  # Mild prececal colitis:  # Pancytopenia   - Ulcer Swab: HSV and VZV PCR pending  - Serum: HSV, VZV, CMV, EBV serum PCR's pending  - empiric cefepime IV, vancomycin IV   - empiric acyclovir 5 mg/kg IV q8 hours  - transplant ID following  - hold off on G-CSF for the time being  - Gram negative rods in bottles from dialysis unit prior to admission.  Continue cefepime.     # Status post OHT on 6/14/18, history of NICM  # Donor 3 vessel CAD  # Chronic immunosuppression  - Rejection history: none  - hold Cellcept  - Tacrolimus 4 mg BID, will hold this evening dose and tomorrow AM (goal was 10-12, will target 8 for now)  - Prednisone 20 mg / 15 mg  - Next Biopsy: 8/1/18  Prophylaxis:  - PCP: off Bactrim due to leukopenia, pentamidine 7/20/18 and every 28 days  - Thrush: Nystatin   - PPI: pantoprazole 40 mg  - CAV: ASA 81 mg, Crestor  - CMV (D+ / R-): holding Valcyte with leukopenia    Serostatus: CMV: D+/R-. EBV: D+/R pending    #Headaches  Ongoing since prior to admission; Head CT negative on admission for bleed. Waxing and waning  - Continue tylenol and tramadol prn     # RIJ Thrombus with CoNS  - continue Eliquis    # ESRD   - listed for renal transplant, EDW 76 kg  - ESRD consult (due Saturday)    Randal Aleman   Internal Medicine Resident  Cardiology Service  [phone]     Discussed with Dr. Muse.  Subjective:   - Headache worsened today but not worse than prior to admit. No nausea. No vomiting. Ambulating and overall feeling better.      Review of Systems:   A comprehensive review of systems was performed and found to be negative except as described in this note     Medications:     Current Facility-Administered Medications   Medication     acetaminophen (TYLENOL) tablet 650 mg     acyclovir (ZOVIRAX) 400 mg in D5W 100 mL intermittent infusion     albuterol (PROAIR HFA/PROVENTIL HFA/VENTOLIN HFA) Inhaler 6 puff     alum & mag hydroxide-simethicone (MYLANTA ES/MAALOX  ES) suspension 30 mL     apixaban ANTICOAGULANT (ELIQUIS) tablet 2.5 mg     aspirin EC tablet 81 mg     biotin tablet TABS 5 mg     ceFEPIme (MAXIPIME) 1g vial to attach to  ml bag for ADULTS or NS 50 ml bag for PEDS     glucose gel 15-30 g    Or     dextrose 50 % injection 25-50 mL    Or     glucagon injection 1 mg     fluticasone (FLONASE) 50 MCG/ACT spray 1-2 spray      "hydrALAZINE (APRESOLINE) tablet 25 mg     insulin aspart (NovoLOG) inj (RAPID ACTING)     insulin aspart (NovoLOG) inj (RAPID ACTING)     insulin isophane human (HumuLIN N PEN) injection 15 Units     [START ON 7/29/2018] insulin isophane human (HumuLIN N PEN) injection 33 Units     isosorbide dinitrate (ISORDIL) tablet 10 mg     Lidocaine (LIDOCARE) 4 % Patch 1 patch    And     [START ON 7/29/2018] lidocaine patch REMOVAL    And     lidocaine patch in PLACE     lidocaine (LMX4) cream     lidocaine 1 % 1 mL     medication instruction     melatonin tablet 2 mg     naloxone (NARCAN) injection 0.1-0.4 mg     nystatin (MYCOSTATIN) suspension 1,000,000 Units     pantoprazole (PROTONIX) EC tablet 40 mg     predniSONE (DELTASONE) tablet 15 mg     predniSONE (DELTASONE) tablet 20 mg     rosuvastatin (CRESTOR) tablet 20 mg     sodium chloride (PF) 0.9% PF flush 3 mL     sodium chloride (PF) 0.9% PF flush 3 mL     sodium chloride (PF) 0.9% PF flush 3 mL     sodium chloride (PF) 0.9% PF flush 3 mL     [Rx hold ] tacrolimus (GENERIC EQUIVALENT) capsule 4 mg     traMADol (ULTRAM) tablet 50 mg     vancomycin (VANCOCIN) 1,250 mg in sodium chloride 0.9 % 250 mL intermittent infusion     vancomycin place bui - receiving intermittent dosing       Objective:   Blood pressure 129/85, pulse 98, temperature 98.1  F (36.7  C), temperature source Oral, resp. rate 16, height 1.753 m (5' 9\"), weight 76.7 kg (169 lb 1.5 oz), SpO2 100 %.  GENERAL: comfortable, NAD, A&O x 3, energy much improved  HEENT: Eye symmetrical, no discharge or icterus bilaterally. Upper hard palate with 4-5 cm white ulcer with necrotic base  CV: Tachycardic, +S1S2, 2+ ejection murmur, rub, or gallop. JVP just above clavicle at  45 degrees. R-upper chest   RESPIRATORY: Respirations regular, even, and unlabored. Lungs CTA throughout.   GI: Soft and non distended with normoactive bowel sounds present in all quadrants. No tenderness, rebound, guarding. No hepatomegaly. "   EXTREMITIES: No peripheral edema. 2+ bilateral pedal pulses.   NEUROLOGIC: Alert and oriented x 3. No focal deficits.   MUSCULOSKELETAL: No joint swelling or tenderness.   SKIN: No jaundice. No rashes or lesions.     Data:   - reviewed all labs and imaging      I have reviewed today's vital signs, notes, medications, labs and imaging.  I have also seen and examined the patient and agree with the findings and plan as outlined above.  Pt with HA.  VSS with Lungs clear and tachy S1 and S2 with no gallop.  Abd is soft.  Labs as above with leukopenia/neutropenia and anemia.  Assessment: Pt with OHT and leukopenia concerning for viral infection vs. Drug rxn.  Continue to provide support and will hold both prograf and cellcept.     Bobby Muse MD, PhD  Professor, Heart Failure and Cardiac Transplantation  [facility]

## 2018-07-28 NOTE — PROGRESS NOTES
Nephrology Progress Note  07/28/2018       ASSESSMENT AND RECOMMENDATIONS:   Murray Nicholson is a 63 yr old male with PMH of HTN, DMII, CM s/p heart transplant (6/14/2018), ESRD, admitted with neutropenic fever and mouth ulcer concerning for HSV vs other virus, also GNR cultured from CVC 7/26.      ESKD: due to DMII, dialyzes TTS schedule at [facility] under care of Dr Mcpherson. Access: tunneled RIJ (replaced 4/17/18). Run time: 4 hrs; EDW 76 kg. Is listed for transplant.  - Dialysis per TTS schedule   - Use low dose heparin on HD  - Heparin lock CVC  - blood cx drawn at outpt dialysis unit on 7/26, one out of two with GNR (please continue to f/u with [facility] at [phone]) I called Sat and still no ID      Volume: EDW 76 kg. Weight 79 today. Oxygen 98% on RA  - Daily standing weights  - plan 3 L UF today       Anemia: hgb 6.7, s/p 1 unit PRBCs, recent labs with ferritin 876, IS 33, iron 74, hgb 7's; on epogen 7600 units per HD (recently increased).  WBC is 0.9 and Cellcept was stopped on admission .  Plts normal.    - Continue epogen  - transfuse per primary team  - rec check parvovirus  - check retic count to determine if epo dose is adequate   - check haptoglobin      BMD: calcium 8.4, alb 2.3, phos 3.0; recent ; currently not on Vit D analogue or phos binder  -    S/p OHT: on 6/14/18, on tacrolimus and pred with transplant team and transplant ID adjusting doses in setting of elevated tacrolimus level and leukopenia/neutropenia and concern for infection.  MMF stopped, valcyte on hold.      Fever/mouth ulcer: neutropenic, started on cefepime and vancomycin; blood cx drawn at outpt dialysis unit on 7/26 one out of two with GNR; HCV swab pending, multiple labs pending (CMV (colitis on CT), varicella, EBV, etc); transplant ID following.     Recommendations were communicated to primary team via this note and text page.      Shannan Nazario MD  [phone]    Interval History :   In the last 24 hours Murray  "Nicholson feels much improved.  Afebrile.  WBC remains low off valcyte, bactrim and MMF.  I called [facility] and they had no update on ID for pos BC.   Pt seen on HD run     Review of Systems:   I reviewed the following systems:  GI: good appetite. no nausea or vomiting or diarrhea. Continued mouth pain  Neuro:  no confusion  Constitutional:  fever has resolved.  CV: no dyspnea or edema.  No chest pain.    Physical Exam:   I/O last 3 completed shifts:  In: 700 [P.O.:600; I.V.:100]  Out: -    /87  Pulse 98  Temp 97.8  F (36.6  C) (Oral)  Resp 16  Ht 1.753 m (5' 9\")  Wt 79.2 kg (174 lb 9.7 oz)  SpO2 100%  BMI 25.78 kg/m2     GENERAL APPEARANCE: healthy  EYES:  no scleral icterus, pupils equal  HENT: mouth without ulcers or lesions  PULM: lungs clear to auscultation  CV: regular rhythm, normal rate, no rub     -JVD no     -edema no   GI: soft, nontender,  INTEGUMENT: no cyanosis, no rash  Access tunneled catheter    Labs:   All labs reviewed by me  Electrolytes/Renal -   Recent Labs   Lab Test  07/28/18   0716  07/27/18   0624  07/26/18   1732  07/23/18   0914   07/18/18   0841   NA  125*  129*  129*  130*   < >  137   POTASSIUM  4.3  4.2  4.4  5.3   < >  5.7*   CHLORIDE  93*  96  94  99   < >  101   CO2  18*  21  26  18*   < >  27   BUN  63*  36*  22  68*   < >  48*   CR  6.17*  4.19*  2.96*  7.09*   < >  4.82*   GLC  309*  202*  158*  285*   < >  145*   TRINI  8.3*  7.8*  8.4*  8.6   < >  8.7   MAG  1.7  1.4*   --   1.7   --   1.6   PHOS   --   3.0   --   2.5   --   2.2*    < > = values in this interval not displayed.       CBC -   Recent Labs   Lab Test  07/28/18   0716  07/27/18   0820  07/27/18   0624  07/26/18   1732   WBC  0.9*   --   1.0*  0.9*   HGB  6.7*  7.2*  6.1*  7.2*   PLT  239   --   168  255       LFTs -   Recent Labs   Lab Test  07/26/18   1732  07/18/18   0841  07/10/18   0711  06/23/18   2126   ALKPHOS  95  140  175*  136   BILITOTAL  0.7  0.5  0.5  0.6   ALT  19  24  20  26   AST  18  14  15  " 13   PROTTOTAL  6.1*  6.3*   --   7.0   ALBUMIN  2.3*  2.9*   --   3.1*       Iron Panel -   Recent Labs   Lab Test  07/10/18   0711  05/08/18   0954  07/19/17   1306   IRON  66  58  46   IRONSAT  28  27  18   ALBERTINA  771*  621*  369         Imaging:  Normal chest CT and head CT 7/26    Current Medications:    acyclovir (ZOVIRAX) IV  5 mg/kg Intravenous Q24H     apixaban ANTICOAGULANT  2.5 mg Oral BID     aspirin  81 mg Oral Daily     biotin  5 mg Oral Daily     ceFEPIme (MAXIPIME) IV  1 g Intravenous Q24H     fluticasone  1-2 spray Both Nostrils Daily     gelatin absorbable  1 each Topical During Hemodialysis (from stock)     heparin  3 mL Intracatheter During Hemodialysis (from stock)     heparin  3 mL Intracatheter During Hemodialysis (from stock)     hydrALAZINE  25 mg Oral TID     insulin aspart  1-7 Units Subcutaneous TID AC     insulin aspart  1-5 Units Subcutaneous At Bedtime     insulin isophane human  15 Units Subcutaneous Daily with supper     [START ON 7/29/2018] insulin isophane human  33 Units Subcutaneous QAM AC     isosorbide dinitrate  10 mg Oral TID AC     nystatin  1,000,000 Units Swish & Swallow 4x Daily     pantoprazole  40 mg Oral QAM     predniSONE  15 mg Oral QPM     predniSONE  20 mg Oral QAM     rosuvastatin  20 mg Oral Daily     sodium chloride (PF)  3 mL Intracatheter During Hemodialysis (from stock)     sodium chloride (PF)  3 mL Intracatheter During Hemodialysis (from stock)     sodium chloride (PF)  3 mL Intravenous Q8H     sodium chloride (PF)  3 mL Intracatheter Q8H     tacrolimus  4 mg Oral BID IS     vancomycin place bui - receiving intermittent dosing  1 each Does not apply See Admin Instructions       heparin (porcine) 500 Units/hr (07/28/18 0814)     - MEDICATION INSTRUCTIONS -       Shannan Nazario MD

## 2018-07-28 NOTE — PROVIDER NOTIFICATION
-------------------CRITICAL LAB VALUE-------------------    Lab Value: Hgb 6.7, WBC 0.9  Time of notification: 8:00 AM  MD notified: Dr. West  Patient status: Stable  Temp:  [97.7  F (36.5  C)-98.3  F (36.8  C)] 97.7  F (36.5  C)  Pulse:  [98] 98  Heart Rate:  [] 89  Resp:  [16-20] 16  BP: (109-150)/(71-98) 150/98  SpO2:  [97 %-100 %] 100 %  Orders received: no new orders at this time.  Pt currently in HD.  MD will go to see pt.  MD also made aware that BG has been elevated and difficult to control.  Per pt, takes 30 units of Humulin at home vs 26 units ordered here.  MD will review, and make changes as needed.

## 2018-07-28 NOTE — PROGRESS NOTES
SP[facility]UAL [facility]  SPIRITUAL ASSESSMENT Progress Note  [facility] ([place]B   ON-CALL VISIT    REFERRAL SOURCE: Epic request    Attempted to visit pt, but he was not in his room, is scheduled for dialysis for most of the day.  Will refer to 7-29 on call .    PLAN: Refer to 7-29 on call , [facility] remains available 24/7 by paging on-call .    Leia Brody  Chaplain Resident  Pager [phone]

## 2018-07-28 NOTE — PHARMACY-VANCOMYCIN DOSING SERVICE
Pharmacy Vancomycin Note  Date of Service 2018  Patient's  1955   63 year old, male    Indication: Intra-abdominal infection/neutropenic fever  Goal Trough Level: 15-20 mg/L  Day of Therapy: 2  Current Vancomycin regimen: intermittent per levels    Current estimated CrCl = ESRD on HD    Creatinine for last 3 days  2018:  5:32 PM Creatinine 2.96 mg/dL  2018:  6:24 AM Creatinine 4.19 mg/dL  2018:  7:16 AM Creatinine 6.17 mg/dL    Recent Vancomycin Levels (past 3 days)  2018:  7:16 AM Vancomycin Level 15.4 mg/L    Vancomycin IV Administrations (past 72 hours)                   vancomycin (VANCOCIN) 1,500 mg in sodium chloride 0.9 % 250 mL intermittent infusion (mg) 1,500 mg New Bag 18 194                Nephrotoxins and other renal medications (Future)    Start     Dose/Rate Route Frequency Ordered Stop    18 1315  vancomycin (VANCOCIN) 1,250 mg in sodium chloride 0.9 % 250 mL intermittent infusion      1,250 mg  over 90 Minutes Intravenous ONCE 18 1308      18 1230  [Rx hold ]  tacrolimus (GENERIC EQUIVALENT) capsule 4 mg     (Rx hold  since 18 1237)    4 mg Oral HOLD 18 1225      18 1936  acyclovir (ZOVIRAX) 400 mg in D5W 100 mL intermittent infusion      5 mg/kg × 77.7 kg Intravenous EVERY 24 HOURS 18 1920      18 1824  vancomycin place bui - receiving intermittent dosing      1 each Does not apply SEE ADMIN INSTRUCTIONS 18 1824               Contrast Orders - past 72 hours (72h ago through future)    Start     Dose/Rate Route Frequency Ordered Stop    18 1853  iopamidol (ISOVUE-370) solution 105 mL      105 mL Intravenous ONCE 18 1852 18          Interpretation of levels and current regimen:  Pre-HD level will estimate post-HD level of 11-15mg/L     Urine output:  unable to determine    Renal Function: ESRD on Dialysis    Plan:  1.  Plan to give 1250mg IV x1 dose (~16mg/kg)  2.  Pharmacy will  check trough levels as appropriate in 1-3 Days per dialysis plans (typically TuThSat schedule)  3. Serum creatinine levels will be ordered daily for the first week of therapy and at least twice weekly for subsequent weeks.      Ana Luisa Hodgson, PharmD, BCPS  Pager [phone]        .

## 2018-07-28 NOTE — PROVIDER NOTIFICATION
-------------------CRITICAL LAB VALUE-------------------    Lab Value: Hgb 6.6  Time of notification: 1:58 PM  MD notified: Cards 2  Patient status: VSS  Temp:  [97.7  F (36.5  C)-98.4  F (36.9  C)] 98  F (36.7  C)  Heart Rate:  [] 93  Resp:  [16-18] 16  BP: (109-172)/() 120/83  SpO2:  [97 %-100 %] 100 %  Orders received: no new orders at this time, team will be by to see pt shortly.  Pt also c/o headache this afternoon, not relieved with Tylenol.  MD will assess.

## 2018-07-28 NOTE — PROGRESS NOTES
HEMODIALYSIS TREATMENT NOTE    Date: 7/28/2018  Time: 12:41 PM    Data:  Pre Wt: 79.2 kg (174 lb 9.7 oz)   Desired Wt: 76 kg   Post Wt: 76.7 kg (169 lb 1.5 oz)  Weight change: 2.5 kg  Ultrafiltration - Post Run Net Total Removed (mL): 3000 mL  Ultrafiltration - Post Run Net Total Gain (mL): 0 mL  Vascular Access Status: Yes, secured and visible  Dialyzer Rinse: Clear  Total Blood Volume Processed: 67.4L  Total Dialysis (Treatment) Time:  3.5 hours    Lab:   Yes    Interventions:  Patient dialyzed 3.5 hrs via CVC. VSS during the run. Net UF 3kg today. CVC site dressing changed and site looks good.  7 unit of of humulin insulin given during the HD run. . Blood culture collected from CVC red port. Hand off report given to primary RN.    Assessment:  Patient tolerated HD run well.     Plan:    Next HD run per renal team.

## 2018-07-29 LAB
ALBUMIN SERPL-MCNC: 1.9 G/DL (ref 3.4–5)
ALP SERPL-CCNC: 106 U/L (ref 40–150)
ALT SERPL W P-5'-P-CCNC: 21 U/L (ref 0–70)
ANION GAP SERPL CALCULATED.3IONS-SCNC: 8 MMOL/L (ref 3–14)
ANISOCYTOSIS BLD QL SMEAR: SLIGHT
AST SERPL W P-5'-P-CCNC: 26 U/L (ref 0–45)
BACTERIA SPEC CULT: ABNORMAL
BACTERIA SPEC CULT: ABNORMAL
BACTERIA SPEC CULT: NORMAL
BASOPHILS # BLD AUTO: 0 10E9/L (ref 0–0.2)
BASOPHILS NFR BLD AUTO: 0 %
BILIRUB DIRECT SERPL-MCNC: <0.1 MG/DL (ref 0–0.2)
BILIRUB SERPL-MCNC: 0.4 MG/DL (ref 0.2–1.3)
BUN SERPL-MCNC: 40 MG/DL (ref 7–30)
CALCIUM SERPL-MCNC: 8.1 MG/DL (ref 8.5–10.1)
CHLORIDE SERPL-SCNC: 93 MMOL/L (ref 94–109)
CMV DNA SPEC NAA+PROBE-ACNC: NORMAL [IU]/ML
CMV DNA SPEC NAA+PROBE-LOG#: NORMAL {LOG_IU}/ML
CO2 SERPL-SCNC: 29 MMOL/L (ref 20–32)
CREAT SERPL-MCNC: 4.11 MG/DL (ref 0.66–1.25)
DIFFERENTIAL METHOD BLD: ABNORMAL
EBV DNA # SPEC NAA+PROBE: NORMAL {COPIES}/ML
EBV DNA SPEC NAA+PROBE-LOG#: NORMAL {LOG_COPIES}/ML
EOSINOPHIL # BLD AUTO: 0 10E9/L (ref 0–0.7)
EOSINOPHIL NFR BLD AUTO: 0 %
ERYTHROCYTE [DISTWIDTH] IN BLOOD BY AUTOMATED COUNT: 19 % (ref 10–15)
GFR SERPL CREATININE-BSD FRML MDRD: 15 ML/MIN/1.7M2
GLUCOSE BLDC GLUCOMTR-MCNC: 162 MG/DL (ref 70–99)
GLUCOSE BLDC GLUCOMTR-MCNC: 177 MG/DL (ref 70–99)
GLUCOSE BLDC GLUCOMTR-MCNC: 235 MG/DL (ref 70–99)
GLUCOSE BLDC GLUCOMTR-MCNC: 306 MG/DL (ref 70–99)
GLUCOSE BLDC GLUCOMTR-MCNC: 318 MG/DL (ref 70–99)
GLUCOSE SERPL-MCNC: 195 MG/DL (ref 70–99)
HCT VFR BLD AUTO: 22.4 % (ref 40–53)
HGB BLD-MCNC: 7.5 G/DL (ref 13.3–17.7)
LYMPHOCYTES # BLD AUTO: 0.1 10E9/L (ref 0.8–5.3)
LYMPHOCYTES NFR BLD AUTO: 24 %
Lab: NORMAL
MAGNESIUM SERPL-MCNC: 1.6 MG/DL (ref 1.6–2.3)
MCH RBC QN AUTO: 29.2 PG (ref 26.5–33)
MCHC RBC AUTO-ENTMCNC: 33.5 G/DL (ref 31.5–36.5)
MCV RBC AUTO: 87 FL (ref 78–100)
MONOCYTES # BLD AUTO: 0.1 10E9/L (ref 0–1.3)
MONOCYTES NFR BLD AUTO: 23 %
NEUTROPHILS # BLD AUTO: 0.3 10E9/L (ref 1.6–8.3)
NEUTROPHILS NFR BLD AUTO: 53 %
PLATELET # BLD AUTO: 253 10E9/L (ref 150–450)
POTASSIUM SERPL-SCNC: 5 MMOL/L (ref 3.4–5.3)
PROT SERPL-MCNC: 5.2 G/DL (ref 6.8–8.8)
RBC # BLD AUTO: 2.57 10E12/L (ref 4.4–5.9)
SODIUM SERPL-SCNC: 130 MMOL/L (ref 133–144)
SPECIMEN SOURCE: ABNORMAL
SPECIMEN SOURCE: NORMAL
TACROLIMUS BLD-MCNC: 21.3 UG/L (ref 5–15)
TME LAST DOSE: ABNORMAL H
VZV DNA SPEC QL NAA+PROBE: NOT DETECTED
WBC # BLD AUTO: 0.6 10E9/L (ref 4–11)

## 2018-07-29 PROCEDURE — 25000131 ZZH RX MED GY IP 250 OP 636 PS 637: Mod: GY | Performed by: INTERNAL MEDICINE

## 2018-07-29 PROCEDURE — 36415 COLL VENOUS BLD VENIPUNCTURE: CPT | Performed by: INTERNAL MEDICINE

## 2018-07-29 PROCEDURE — 25000132 ZZH RX MED GY IP 250 OP 250 PS 637: Mod: GY | Performed by: STUDENT IN AN ORGANIZED HEALTH CARE EDUCATION/TRAINING PROGRAM

## 2018-07-29 PROCEDURE — 86747 PARVOVIRUS ANTIBODY: CPT | Performed by: INTERNAL MEDICINE

## 2018-07-29 PROCEDURE — 25000128 H RX IP 250 OP 636: Performed by: INTERNAL MEDICINE

## 2018-07-29 PROCEDURE — 99233 SBSQ HOSP IP/OBS HIGH 50: CPT | Mod: GC | Performed by: INTERNAL MEDICINE

## 2018-07-29 PROCEDURE — 25000128 H RX IP 250 OP 636: Performed by: STUDENT IN AN ORGANIZED HEALTH CARE EDUCATION/TRAINING PROGRAM

## 2018-07-29 PROCEDURE — 25000125 ZZHC RX 250: Performed by: STUDENT IN AN ORGANIZED HEALTH CARE EDUCATION/TRAINING PROGRAM

## 2018-07-29 PROCEDURE — 12000006 ZZH R&B IMCU INTERMEDIATE UMMC

## 2018-07-29 PROCEDURE — 80076 HEPATIC FUNCTION PANEL: CPT | Performed by: STUDENT IN AN ORGANIZED HEALTH CARE EDUCATION/TRAINING PROGRAM

## 2018-07-29 PROCEDURE — A9270 NON-COVERED ITEM OR SERVICE: HCPCS | Mod: GY | Performed by: INTERNAL MEDICINE

## 2018-07-29 PROCEDURE — 83735 ASSAY OF MAGNESIUM: CPT | Performed by: STUDENT IN AN ORGANIZED HEALTH CARE EDUCATION/TRAINING PROGRAM

## 2018-07-29 PROCEDURE — 25000132 ZZH RX MED GY IP 250 OP 250 PS 637: Mod: GY | Performed by: INTERNAL MEDICINE

## 2018-07-29 PROCEDURE — 36415 COLL VENOUS BLD VENIPUNCTURE: CPT | Performed by: STUDENT IN AN ORGANIZED HEALTH CARE EDUCATION/TRAINING PROGRAM

## 2018-07-29 PROCEDURE — 80197 ASSAY OF TACROLIMUS: CPT | Performed by: INTERNAL MEDICINE

## 2018-07-29 PROCEDURE — A9270 NON-COVERED ITEM OR SERVICE: HCPCS | Mod: GY | Performed by: STUDENT IN AN ORGANIZED HEALTH CARE EDUCATION/TRAINING PROGRAM

## 2018-07-29 PROCEDURE — 00000146 ZZHCL STATISTIC GLUCOSE BY METER IP

## 2018-07-29 PROCEDURE — 80048 BASIC METABOLIC PNL TOTAL CA: CPT | Performed by: STUDENT IN AN ORGANIZED HEALTH CARE EDUCATION/TRAINING PROGRAM

## 2018-07-29 PROCEDURE — 87798 DETECT AGENT NOS DNA AMP: CPT | Performed by: INTERNAL MEDICINE

## 2018-07-29 PROCEDURE — 85025 COMPLETE CBC W/AUTO DIFF WBC: CPT | Performed by: STUDENT IN AN ORGANIZED HEALTH CARE EDUCATION/TRAINING PROGRAM

## 2018-07-29 RX ORDER — MAGNESIUM SULFATE 1 G/100ML
1 INJECTION INTRAVENOUS ONCE
Status: COMPLETED | OUTPATIENT
Start: 2018-07-29 | End: 2018-07-29

## 2018-07-29 RX ORDER — HYDRALAZINE HYDROCHLORIDE 25 MG/1
25 TABLET, FILM COATED ORAL ONCE
Status: COMPLETED | OUTPATIENT
Start: 2018-07-29 | End: 2018-07-29

## 2018-07-29 RX ORDER — HYDRALAZINE HYDROCHLORIDE 50 MG/1
50 TABLET, FILM COATED ORAL 3 TIMES DAILY
Status: DISCONTINUED | OUTPATIENT
Start: 2018-07-29 | End: 2018-08-06

## 2018-07-29 RX ORDER — HYDRALAZINE HYDROCHLORIDE 50 MG/1
50 TABLET, FILM COATED ORAL 3 TIMES DAILY
Status: DISCONTINUED | OUTPATIENT
Start: 2018-07-29 | End: 2018-07-29

## 2018-07-29 RX ADMIN — CEFEPIME HYDROCHLORIDE 1 G: 1 INJECTION, POWDER, FOR SOLUTION INTRAMUSCULAR; INTRAVENOUS at 17:29

## 2018-07-29 RX ADMIN — HYDRALAZINE HYDROCHLORIDE 50 MG: 50 TABLET ORAL at 21:13

## 2018-07-29 RX ADMIN — INSULIN ASPART 2 UNITS: 100 INJECTION, SOLUTION INTRAVENOUS; SUBCUTANEOUS at 07:52

## 2018-07-29 RX ADMIN — MAGNESIUM SULFATE HEPTAHYDRATE 1 G: 1 INJECTION, SOLUTION INTRAVENOUS at 11:22

## 2018-07-29 RX ADMIN — PREDNISONE 15 MG: 5 TABLET ORAL at 21:13

## 2018-07-29 RX ADMIN — ISOSORBIDE DINITRATE 10 MG: 10 TABLET ORAL at 12:04

## 2018-07-29 RX ADMIN — HYDRALAZINE HYDROCHLORIDE 25 MG: 25 TABLET ORAL at 08:25

## 2018-07-29 RX ADMIN — INSULIN ASPART 8 UNITS: 100 INJECTION, SOLUTION INTRAVENOUS; SUBCUTANEOUS at 12:56

## 2018-07-29 RX ADMIN — INSULIN HUMAN 18 UNITS: 100 INJECTION, SUSPENSION SUBCUTANEOUS at 18:48

## 2018-07-29 RX ADMIN — PREDNISONE 20 MG: 20 TABLET ORAL at 07:47

## 2018-07-29 RX ADMIN — HYDRALAZINE HYDROCHLORIDE 25 MG: 25 TABLET ORAL at 06:52

## 2018-07-29 RX ADMIN — NYSTATIN 1000000 UNITS: 100000 SUSPENSION ORAL at 07:47

## 2018-07-29 RX ADMIN — ACETAMINOPHEN 650 MG: 325 TABLET, FILM COATED ORAL at 23:52

## 2018-07-29 RX ADMIN — NYSTATIN 1000000 UNITS: 100000 SUSPENSION ORAL at 16:35

## 2018-07-29 RX ADMIN — NYSTATIN 1000000 UNITS: 100000 SUSPENSION ORAL at 21:13

## 2018-07-29 RX ADMIN — APIXABAN 2.5 MG: 2.5 TABLET, FILM COATED ORAL at 21:13

## 2018-07-29 RX ADMIN — ACETAMINOPHEN 650 MG: 325 TABLET, FILM COATED ORAL at 14:37

## 2018-07-29 RX ADMIN — TRAMADOL HYDROCHLORIDE 50 MG: 50 TABLET, COATED ORAL at 06:57

## 2018-07-29 RX ADMIN — ROSUVASTATIN CALCIUM 20 MG: 10 TABLET, FILM COATED ORAL at 07:46

## 2018-07-29 RX ADMIN — PANTOPRAZOLE SODIUM 40 MG: 40 TABLET, DELAYED RELEASE ORAL at 07:46

## 2018-07-29 RX ADMIN — Medication 5 MG: at 07:46

## 2018-07-29 RX ADMIN — INSULIN HUMAN 33 UNITS: 100 INJECTION, SUSPENSION SUBCUTANEOUS at 07:52

## 2018-07-29 RX ADMIN — INSULIN ASPART 4 UNITS: 100 INJECTION, SOLUTION INTRAVENOUS; SUBCUTANEOUS at 18:48

## 2018-07-29 RX ADMIN — Medication 50 MG: at 21:26

## 2018-07-29 RX ADMIN — APIXABAN 2.5 MG: 2.5 TABLET, FILM COATED ORAL at 07:46

## 2018-07-29 RX ADMIN — ISOSORBIDE DINITRATE 10 MG: 10 TABLET ORAL at 07:46

## 2018-07-29 RX ADMIN — LIDOCAINE 1 PATCH: 560 PATCH PERCUTANEOUS; TOPICAL; TRANSDERMAL at 21:12

## 2018-07-29 RX ADMIN — HYDRALAZINE HYDROCHLORIDE 50 MG: 50 TABLET ORAL at 14:24

## 2018-07-29 RX ADMIN — TRAMADOL HYDROCHLORIDE 50 MG: 50 TABLET, COATED ORAL at 00:54

## 2018-07-29 RX ADMIN — ISOSORBIDE DINITRATE 10 MG: 10 TABLET ORAL at 17:35

## 2018-07-29 RX ADMIN — FLUTICASONE PROPIONATE 2 SPRAY: 50 SPRAY, METERED NASAL at 07:56

## 2018-07-29 RX ADMIN — NYSTATIN 1000000 UNITS: 100000 SUSPENSION ORAL at 12:04

## 2018-07-29 RX ADMIN — ASPIRIN 81 MG: 81 TABLET, COATED ORAL at 21:13

## 2018-07-29 ASSESSMENT — ACTIVITIES OF DAILY LIVING (ADL)
ADLS_ACUITY_SCORE: 11

## 2018-07-29 ASSESSMENT — PAIN DESCRIPTION - DESCRIPTORS
DESCRIPTORS: HEADACHE
DESCRIPTORS: ACHING
DESCRIPTORS: HEADACHE

## 2018-07-29 NOTE — PROVIDER NOTIFICATION
Informed [facility] of pt being hypertensive this morning. Will give scheduled 8AM hydralazine now.

## 2018-07-29 NOTE — PROGRESS NOTES
[facility]  Transplant Infectious Disease Progress Note     Patient:  Murray Nicholson, Date of birth 1955, Medical record number [id number]  Date of Visit:  07/29/2018         Assessment and Recommendations:   Recommendations:  - DC acyclovir and vancomycin  - Continue with Cefepime for now  - Will call Eliseo ([phone]) for blood culture results (GNR)   - Please check parvovirus B19 PCR in blood  - Pending blood culture results will give further recommendations    Thank you very much for this consultation. Transplant Infectious Disease will continue to follow with you.    Assessment:  Pt is a 64 y/o male with a PMH of ischemic/valvular cardiomyopathy s/p OHT (6/14/18, induction w/ solumedrol and maintenance w/ tacrolimus, MMF, and prednisone), ESRD on HD (listed for kidney transplantation), HTN, HLD, and DM2 who presents w/ 3-4 days of fevers and headache and ulcer on the roof of his mouth for 2-3 days.     Infectious Disease issues include:  - Neutropenic fever: Patient with GNR bacteremia and a mouth ulcer growing PSA. Likely GNR in blood is the same PSA. Improving on cefepime (mouth PSA was pan sensitive). Will continue. HSV PCR neg, VZV PCR neg and CMV PCR neg.     - Leucopenia and neutropenia: Most likely from immunosuppression. Patient with supra therapeutic tacrolimus levels (today 21.3). Will check just in case parvovirus B19 but unlikely to be from a viral infection.     - Colitis on CT abdomen: CT abdomen with findings compatible with colitis, however the patient is asymptomatic, no fevers, n/v, abdominal pain or diarrhea. Having normal regular BMs. Will not pursue colonoscopy at this time to r/o CMV colitis since patient is asymptomatic. Of note, patient is high risk D+/R-, in those cases viremia seems to correlate more with colitis, his CMV PCR blood was neg.    - QTc interval: 451ms 7/26/18  - Viral serostatus & prophylaxis: HSV1-, HSV1+, CMV D+/R-, EBV D?/R+, VZV+:  -  "Isolation status: neutropenic precautions    Attestation:  I have reviewed today's vital signs, medications, labs and imaging.      Floor time: 25 minutes, Face-to-face time: 10 minutes, Total time: 35 minutes    SETH CHACKO MD  Infectious Diseases Attending  Pager: [phone]        Interval History:   Patient doing well, denies fevers, chills, fatigue has improved, eating well, no diarrhea, n/v or abdominal pain. No urinary symptoms. No acute events overnight. Normal O2 needs. Unchanged mouth ulcer discomfort.          History of Infectious Disease Illness:   Pt is a 64 y/o male with a PMH of ischemic/valvular cardiomyopathy s/p OHT (6/14/18, induction w/ solumedrol and maintenance w/ tacrolimus, MMF, and prednisone), ESRD on HD (listed for kidney transplantation), HTN, HLD, and DM2 who presents w/ 3-4 days of fevers and headache and ulcer on the roof of his mouth for 2-3 days. Pt reports having daily fevers, usually at night, up to 101degF.  He also endorses a constant, painful, bifrontal headache.  He reports the headache is not worsened by position but endorses that it has been constantly present.  Pt endorsed some neck stiffness a couple days ago but denies neck stiffness at this time. Pt reports the ulcer initially felt as though \"he ate a bagel that scrapped the roof of his mouth\" and was associated with a sore throat.  He denies pain w/ swallowing.  He endorses soft stools, but says he has had softer stools since his last admission in June 2018.  He also endorses rhinorrhea and postnasal drip over the past 3-4 days. He also denies chest pain, palpitations, SOB, n/v, abdominal pain, and  changes in vision.  On admission on 7/26/18, pt had fever of 100.8degF, a WBC of 0.9, and CRP of 270.  Pt was started on cefepime, vanc, and acyclovir.  Pt had a tacrolimus level of 22.5 on 7/27/18.  Pt was CMV and EBV PCR negative on 7/18/18      Transplants:  6/14/2018 (Heart), Postoperative day:  45.  Coordinator Lillie " Britni Ulloa    Review of Systems:   ROS: 10 point ROS neg other than the symptoms noted above in the interval history.    Patient Active Problem List   Diagnosis     Esophageal reflux     Hypersomnia with sleep apnea     Allergic rhinitis     CKD (chronic kidney disease) stage 5, GFR less than 15 ml/min (H)     Hyperlipidemia LDL goal <100     Hypertension goal BP (blood pressure) < 130/80     Hypertensive cardiopathy     Anemia of chronic disease     Anemia in chronic renal disease     Hypertension     Dyslipidemia     MGUS (monoclonal gammopathy of unknown significance)     Ascending aortic aneurysm (H)     Type 2 diabetes mellitus with diabetic chronic kidney disease (H)     Anemia, iron deficiency     Anemia in stage 5 chronic kidney disease (H)     Heart transplanted (H)     Leukopenia     Heart transplant recipient (H)     Fever            Current Medications & Allergies:       acyclovir (ZOVIRAX) IV  5 mg/kg Intravenous Q24H     apixaban ANTICOAGULANT  2.5 mg Oral BID     aspirin  81 mg Oral Daily     biotin  5 mg Oral Daily     ceFEPIme (MAXIPIME) IV  1 g Intravenous Q24H     fluticasone  1-2 spray Both Nostrils Daily     hydrALAZINE  50 mg Oral TID     insulin aspart  1-10 Units Subcutaneous TID AC     insulin aspart  1-7 Units Subcutaneous At Bedtime     [START ON 7/30/2018] insulin aspart   Subcutaneous QAM AC     [START ON 7/30/2018] insulin aspart   Subcutaneous Daily with lunch     insulin aspart   Subcutaneous Daily with supper     insulin isophane human  15 Units Subcutaneous Daily with supper     insulin isophane human  33 Units Subcutaneous QAM AC     isosorbide dinitrate  10 mg Oral TID AC     lidocaine  1 patch Transdermal Q24h    And     lidocaine   Transdermal Q24H    And     lidocaine   Transdermal Q8H     nystatin  1,000,000 Units Swish & Swallow 4x Daily     pantoprazole  40 mg Oral QAM     predniSONE  15 mg Oral QPM     predniSONE  20 mg Oral QAM     rosuvastatin  20 mg Oral Daily      sodium chloride (PF)  3 mL Intravenous Q8H     sodium chloride (PF)  3 mL Intracatheter Q8H     vancomycin place bui - receiving intermittent dosing  1 each Does not apply See Admin Instructions       Infusions/Drips:    - MEDICATION INSTRUCTIONS -         Allergies   Allergen Reactions     Norco [Hydrocodone-Acetaminophen] Nausea and Vomiting     Cats      Throat tightness     Isosorbide Other (See Comments)     hypotension     Penicillins Hives     Seasonal Allergies      rhinitis     Shrimp      Throat closes             Physical Exam:   Vitals were reviewed.  All vitals stable.  Patient Vitals for the past 24 hrs:   BP Temp Temp src Resp SpO2   07/29/18 1424 (!) 146/93 - - - -   07/29/18 1129 123/85 97.7  F (36.5  C) Oral 16 100 %   07/29/18 0918 123/85 - - - -   07/29/18 0721 (!) 185/117 97.9  F (36.6  C) Oral 16 100 %   07/29/18 0636 (!) 164/114 - - - -   07/29/18 0500 (!) 148/107 97.9  F (36.6  C) Oral 18 99 %   07/29/18 0044 (!) 136/94 98.1  F (36.7  C) Oral 16 100 %   07/28/18 1930 (!) 143/96 98.2  F (36.8  C) Oral 16 100 %   07/28/18 1845 140/84 98  F (36.7  C) Oral - 100 %   07/28/18 1745 (!) 138/93 97.9  F (36.6  C) Oral 16 100 %   07/28/18 1640 129/82 98.1  F (36.7  C) Oral 16 -   07/28/18 1614 129/85 98.1  F (36.7  C) Oral 16 100 %     Ranges for vital signs:  Temp:  [97.7  F (36.5  C)-98.2  F (36.8  C)] 97.7  F (36.5  C)  Heart Rate:  [] 86  Resp:  [16-18] 16  BP: (123-185)/() 146/93  SpO2:  [99 %-100 %] 100 %  Vitals:    07/28/18 0721 07/28/18 0812 07/28/18 1156   Weight: 79.2 kg (174 lb 9.7 oz) 79.2 kg (174 lb 9.7 oz) 76.7 kg (169 lb 1.5 oz)       Physical Examination:  GENERAL:  well-developed, well-nourished, in bed in no acute distress.  HEAD:  Head is normocephalic, atraumatic   EYES:  Eyes have anicteric sclerae without conjunctival injection   ENT:  4-5cm ulcer on the hard palate near the base of the upper teeth with white base and no erythema  NECK:  Supple. No cervical  lymphadenopathy  LUNGS:  Clear to auscultation bilateral.   CARDIOVASCULAR:  Regular rate and rhythm with no murmurs, gallops or rubs.  ABDOMEN:  Normal bowel sounds, soft, nontender.  SKIN:  No acute rashes.    NEUROLOGIC:  Grossly nonfocal.         Laboratory Data:     Inflammatory Markers    Recent Labs   Lab Test  07/26/18   1732  07/02/18   0601  06/30/18   0852  07/05/17   1204  05/31/17   1111  05/17/17   1214   01/13/16   1337   SED   --    --    --    --    --    --    --   80*   CRP  270.0*  7.3  12.0*  35.4*  15.9*  39.3*   < >   --     < > = values in this interval not displayed.       Immune Globulin Studies     Recent Labs   Lab Test  05/19/15   1000   IGG  1380   IGM  108   IGA  203       Metabolic Studies       Recent Labs   Lab Test  07/29/18   0523  07/28/18   0716  07/27/18   0624  07/26/18   2222  07/26/18   1735   07/18/18   0841   07/01/18   0609   04/12/17   0935   NA  130*  125*  129*   --    --    < >  137   < >  131*   < >   --    POTASSIUM  5.0  4.3  4.2   --    --    < >  5.7*   < >  4.6   < >   --    CHLORIDE  93*  93*  96   --    --    < >  101   < >  97   < >   --    CO2  29  18*  21   --    --    < >  27   < >  21   < >   --    ANIONGAP  8  14  12   --    --    < >  8   < >  14   < >   --    BUN  40*  63*  36*   --    --    < >  48*   < >  40*   < >   --    CR  4.11*  6.17*  4.19*   --    --    < >  4.82*   < >  4.64*   < >   --    GFRESTIMATED  15*  9*  14*   --    --    < >  12*   < >  13*   < >   --    GLC  195*  309*  202*   --    --    < >  145*   < >  284*   < >   --    A1C   --    --    --    --    --    --   7.0*   --    --    < >   --    TRINI  8.1*  8.3*  7.8*   --    --    < >  8.7   < >  8.4*   < >   --    PHOS   --    --   3.0   --    --    < >  2.2*   < >   --    < >   --    MAG  1.6  1.7  1.4*   --    --    < >  1.6   < >   --    < >   --    LACT   --    --    --   0.4*  3.2*   --    --    --    --    < >   --    PCAL   --    --    --    --    --    --    --    --   1.25    < >  0.88   CKT   --    --    --    --    --    --   131   --    --    --   70    < > = values in this interval not displayed.       Hepatic Studies    Recent Labs   Lab Test  07/26/18   1732  07/18/18   0841  07/10/18   0711  06/25/18   0509  06/23/18   2126   05/17/17   1214   05/19/15   1000   BILITOTAL  0.7  0.5  0.5   --   0.6   < >  0.6   < >  0.4   DBIL   --    --    --    --    --    --   0.1   --   0.1   ALKPHOS  95  140  175*   --   136   < >  86   < >  126   PROTTOTAL  6.1*  6.3*   --    --   7.0   < >  6.5*   < >  7.1   ALBUMIN  2.3*  2.9*   --    --   3.1*   < >  2.6*   < >  2.9*   AST  18  14  15   --   13   < >  16   < >  15   ALT  19  24  20   --   26   < >  12   < >  20   LDH   --    --    --   271*   --    --    --    --   252*    < > = values in this interval not displayed.       Pancreatitis testing    Recent Labs   Lab Test  07/18/18   0841  06/14/18   1207   AMYLASE   --   62   TRIG  88   --        Gout Labs      Recent Labs   Lab Test  07/10/18   0711  04/24/18   0530  04/18/18   0642  03/07/18   0833  01/26/18   1446   URIC  6.1  5.8  3.1*  3.1*  3.5       Hematology Studies      Recent Labs   Lab Test  07/29/18   0523   07/28/18   0716   07/27/18   0624  07/26/18   1732  07/23/18   0914  07/20/18   0926   WBC  0.6*   --   0.9*   --   1.0*  0.9*  1.5*  1.0*   ANEU  0.3*   --   0.5*   --   0.7*  0.6*  1.3*  0.8*   ALYM  0.1*   --   0.2*   --   0.0*  0.0*  0.1*  0.2*   DK  0.1   --   0.2   --   0.2  0.0  0.1  0.1   AEOS  0.0   --   0.0   --   0.0  0.0  0.0  0.0   HGB  7.5*   < >  6.7*   < >  6.1*  7.2*  7.0*  7.5*   HCT  22.4*   --   20.1*   --   18.6*  21.3*  21.2*  23.3*   PLT  253   --   239   --   168  255  216  257    < > = values in this interval not displayed.       Clotting Studies    Recent Labs   Lab Test  07/26/18   1732  06/29/18   0545  06/28/18   0554  06/27/18   0629  06/26/18   0433   INR  1.24*   --   1.15*  1.10  1.07   PTT   --   30  29  29  29       Iron Testing    Recent  Labs   Lab Test  07/29/18 0523  07/28/18   1302   07/10/18   0711   05/08/18   0954   05/05/18   1302   07/19/17   1306   05/19/15   1000   IRON   --    --    --   66   --   58   --    --    --   46   < >  30*   FEB   --    --    --   238*   --   218*   --    --    --   263   < >  392   IRONSAT   --    --    --   28   --   27   --    --    --   18   < >  8*   ALBERTINA   --    --    --   771*   --   621*   --    --    --   369   < >  19*   MCV  87   --    < >  92   < >   --    < >   --    < >   --    < >  82   FOLIC   --    --    --    --    --    --    --   58.5   --    --    --   12.7   B12   --    --    --    --    --    --    --    --    --    --    --   816   HAPT   --    --    --    --    --    --    --    --    --    --    --   320*   RETP   --   1.5   --    --    --    --    --    --    --    --    --    --    RETICABSCT   --   34.4   --    --    --    --    --    --    --    --    --    --     < > = values in this interval not displayed.       Arterial Blood Gas Testing    Recent Labs   Lab Test  06/16/18   0836  06/16/18   0828 06/16/18   0655  06/16/18   0334  06/15/18   2200  06/15/18   1956   PH   --   7.43  7.43  7.46*  7.47*  7.45   PCO2   --   38  38  35  34*  36   PO2   --   120*  171*  180*  169*  172*   HCO3   --   25  25  25  25  25   O2PER  4L  4L  40  40  40  40  40        Thyroid Studies     Recent Labs   Lab Test  04/16/18   1546  02/21/18   0900  04/12/17   0935  04/09/15   0900  05/15/13   1418   TSH  2.25  3.59  1.26  3.47  2.05       Urine Studies     Recent Labs   Lab Test  07/28/18   0528 07/18/18   0855  06/25/18   1420  02/05/18   0935  02/04/18   2127   URINEPH  5.5  6.0  5.0  5.5  Canceled: Specimen improperly labeled. Laboratory value report is not on this patient.   NITRITE  Negative  Negative  Negative  Negative  Canceled: Specimen improperly labeled. Laboratory value report is not on this patient.*   LEUKEST  Negative  Trace*  Small*  Negative  Canceled: Specimen improperly  labeled. Laboratory value report is not on this patient.*   WBCU  5  12*  9*  4*  Canceled: Specimen improperly labeled. Laboratory value report is not on this patient.*       Medication levels    Recent Labs   Lab Test  07/29/18   0523  07/28/18   0716   01/17/18   0900   VANCOMYCIN   --   15.4   < >   --    GENT   --    --    --   8.2   TACROL  21.3*  24.7*   < >   --     < > = values in this interval not displayed.       Body fluid stats    Recent Labs   Lab Test  07/26/18   1804   01/15/18   0000  01/13/18   2330  01/13/18   0030   FTYP   --    --   Peritoneal fluid  Peritoneal fluid  Peritoneal fluid   FCOL   --    --   Colorless  Yellow  Colorless   FAPR   --    --   Cloudy  Cloudy  Slightly Cloudy   FRBC   --    --   << Do Not Report >>   --   << Do Not Report >>   FWBC   --    --   4256  268  2527   FNEU   --    --   91  56  88   FLYM   --    --   2  21   --    FMONO   --    --   6  23  11   FBAS   --    --   1   --   1   GS  Few  Gram positive rods  *  Few  Gram positive cocci  *  Few  Gram negative rods  *  No WBC's seen   < >   --    --    --     < > = values in this interval not displayed.       Microbiology:  Last Culture results with specimen source  Culture Micro   Date Value Ref Range Status   07/28/2018 No growth after 16 hours  Preliminary   07/28/2018 No growth after 17 hours  Preliminary   07/28/2018 <10,000 colonies/mL  mixed urogenital tiffany    Final   07/26/2018 Light growth  Pseudomonas aeruginosa   (A)  Final   07/26/2018 Plus  Heavy growth  Normal oral tiffany    Final   07/26/2018 No growth after 3 days  Preliminary   07/26/2018 No growth after 3 days  Preliminary   07/18/2018 No growth after 8 days  Preliminary   06/26/2018 No growth  Final   06/26/2018 No growth  Final   06/24/2018 No growth  Final   06/24/2018 No growth  Final   06/14/2018   Preliminary    Culture received and in progress.  Positive AFB results are called as soon as detected.    Final report to follow in 7 to 8  weeks.     06/14/2018   Preliminary    [facility] [address] [place] [address] [phone]   06/14/2018 (A)  Final    On day 2, isolated in broth only:  Coagulase negative Staphylococcus  not isolated or reported on routine culture  Susceptibility testing not routinely done     06/14/2018 (A)  Final    On day 2, isolated in broth only:  Strain 2  Coagulase negative Staphylococcus  Susceptibility testing not routinely done     06/14/2018 not isolated or reported on routine culture  Final   06/14/2018 (A)  Final    These bacteria are part of normal skin tiffany, but on occasion, may be true pathogens.    Clinical correlation must be applied to interpreting this microbiology result.     06/14/2018 No anaerobes isolated  Final   06/14/2018 Culture negative after 4 weeks  Final   06/14/2018 No growth  Final    Specimen Description   Date Value Ref Range Status   07/28/2018 Blood Left Arm  Final   07/28/2018 Blood Central venous catheter Red port  Final   07/28/2018 Midstream Urine  Final   07/27/2018 Nares  Final   07/26/2018 Mouth Wound  Final   07/26/2018 Mouth Wound  Final   07/26/2018 Blood Left Arm  Final   07/26/2018 Blood Right Arm  Final   07/26/2018 Blood  Final   07/18/2018 Toe Right Great  Final   06/26/2018 Blood Unspecified Site  Final   06/26/2018 Blood Right Hand  Final   06/25/2018 Feces  Final   06/24/2018 Blood Right Hand  Final   06/24/2018 Blood Right Arm  Final   06/15/2018 Nares  Final   06/14/2018   Final    Tissue THROMBIS FROM END OF DIALYSIS CATHETER SPECIMEN 1   06/14/2018   Final    Tissue THROMBIS FROM END OF DIALYSIS CATHETER SPECIMEN 1   06/14/2018   Final    Tissue THROMBIS FROM END OF DIALYSIS CATH SPECIMEN 1   06/14/2018   Final    Tissue THROMBIS FROM END OF DIALYSIS CATH SPECIMEN 1   06/14/2018   Final    Tissue THROMBIS FROM END OF DIALYSIS CATH SPECIMEN 1   06/14/2018   Final    Tissue THROMBIS FROM END OF DIALYSIS CATH SPECIMEN 1        Last check of C  difficile  C Diff Toxin B PCR   Date Value Ref Range Status   06/25/2018 Negative NEG^Negative Final     Comment:     Negative: Clostridium difficile target DNA sequences NOT detected, presumed   negative for Clostridium difficile toxin B or the number of bacteria present   may be below the limit of detection for the test.  FDA approved assay performed using [employer] [facility] [facility] real-time PCR.  A negative result does not exclude actual disease due to Clostridium difficile   and may be due to improper collection, handling and storage of the specimen   or the number of organisms in the specimen is below the detection limit of the   assay.         Infectious Disease Testing     Recent Labs   Lab Test  08/10/15   0729   TREPAB  Negative     Recent Labs   Lab Test  04/16/18   1546  06/07/17   1446  08/10/15   0729   TBRSLT  Negative  Negative  Negative       Virology:  CMV viral loads    Recent Labs   Lab Test  07/26/18   1732  07/18/18   0842  07/10/18   0711   CSPEC  Plasma  Plasma  Plasma, EDTA anticoagulant   CMVLOG  Not Calculated  Not Calculated  Not Calculated       Log IU/mL of CMVQNT   Date Value Ref Range Status   07/26/2018 Not Calculated <2.1 [Log_IU]/mL Final   07/18/2018 Not Calculated <2.1 [Log_IU]/mL Final   07/10/2018 Not Calculated <2.1 [Log_IU]/mL Final       EBV DNA Copies/mL   Date Value Ref Range Status   07/26/2018 EBV DNA Not Detected EBVNEG^EBV DNA Not Detected [Copies]/mL Final   07/18/2018 EBV DNA Not Detected EBVNEG^EBV DNA Not Detected [Copies]/mL Final       Adenovirus Testing    Recent Labs   Lab Test  06/28/18   2148   ADENOVIRUSAG  Negative       Hepatitis B Testing     Recent Labs   Lab Test  07/18/18   0841  06/14/18   0705  04/16/18   1546   AUSAB   --   66.74*  28.18*   HBCAB  Nonreactive   --   Nonreactive   HEPBANG  Nonreactive  Nonreactive  Nonreactive        Hepatitis C Antibody   Date Value Ref Range Status   07/18/2018 Nonreactive NR^Nonreactive Final     Comment:     Assay  performance characteristics have not been established for newborns,   infants, and children     04/16/2018 Nonreactive NR^Nonreactive Final     Comment:     Assay performance characteristics have not been established for newborns,   infants, and children         CMV Antibody IgG   Date Value Ref Range Status   06/14/2018 0.2 0.0 - 0.8 AI Final     Comment:     Negative  Antibody index (AI) values reflect qualitative changes in antibody   concentration that cannot be directly associated with clinical condition or   disease state.     04/16/2018 <0.2 0.0 - 0.8 AI Final     Comment:     Negative  Antibody index (AI) values reflect qualitative changes in antibody   concentration that cannot be directly associated with clinical condition or   disease state.     08/10/2015 0.2 0.0 - 0.8 AI Final     Comment:     Negative   Antibody index (AI) values reflect qualitative changes in antibody   concentration that cannot be directly associated with clinical condition or   disease state.       Varicella Zoster Virus Antibody IgG   Date Value Ref Range Status   04/16/2018 5.7 (H) 0.0 - 0.8 AI Final     Comment:     Positive, suggests prev. exposure and probable immunity  Antibody index (AI) values reflect qualitative changes in antibody   concentration that cannot be directly associated with clinical condition or   disease state.     08/10/2015 (H) 0.0 - 0.8 AI Final    >8.0  Positive, suggests prev. exposure and probable immunity   Antibody index (AI) values reflect qualitative changes in antibody   concentration that cannot be directly associated with clinical condition or   disease state.       EBV Capsid Antibody IgG   Date Value Ref Range Status   06/14/2018 >8.0 (H) 0.0 - 0.8 AI Final     Comment:     Positive, suggests recent or past exposure  Antibody index (AI) values reflect qualitative changes in antibody   concentration that cannot be directly associated with clinical condition or   disease state.     04/16/2018 7.1  (H) 0.0 - 0.8 AI Final     Comment:     Positive, suggests recent or past exposure  Antibody index (AI) values reflect qualitative changes in antibody   concentration that cannot be directly associated with clinical condition or   disease state.     08/10/2015 7.2 (H) 0.0 - 0.8 AI Final     Comment:     Positive, suggests recent or past exposure   Antibody index (AI) values reflect qualitative changes in antibody   concentration that cannot be directly associated with clinical condition or   disease state.       Toxoplasma Antibody IgG   Date Value Ref Range Status   04/16/2018 <3.0 0.0 - 7.1 IU/mL Final     Comment:     Negative- Absence of detectable Toxoplasma gondii IgG antibodies. A negative   result does not rule out acute infection.  The magnitude of the measured result is not indicative of the amount of   antibody present. The concentrations of anti-Toxoplasma gondii IgG in a given   specimen determined with assays from different manufacturers can vary due to   differences in assay methods and reagent specificity.       Herpes Simplex Virus Type 1 IgG   Date Value Ref Range Status   04/16/2018 0.3 0.0 - 0.8 AI Final     Comment:     No HSV-1 IgG antibodies detected.  Antibody index (AI) values reflect qualitative changes in antibody   concentration that cannot be directly associated with clinical condition or   disease state.       Herpes Simplex Virus Type 2 IgG   Date Value Ref Range Status   04/16/2018 >8.0 (H) 0.0 - 0.8 AI Final     Comment:     Positive.  IgG antibody to HSV-2 detected.  Antibody index (AI) values reflect qualitative changes in antibody   concentration that cannot be directly associated with clinical condition or   disease state.         Imaging:  No results found for this or any previous visit (from the past 48 hour(s)).

## 2018-07-29 NOTE — PROGRESS NOTES
"SPIRITUAL HEALTH SERVICES  SPIRITUAL ASSESSMENT Progress Note  [facility] ([place] 6B     REFERRAL SOURCE: Hospital  request    Spoke with pt. Pt shared his hope for  Miguelito to visit him. I informed pt that Bill is currently away for a few weeks and I would be happy to visit with the pt. Pt shared that he did not \"currently need a visit\".     PLAN: Will notify unit  of visit. [facility] remains available upon request    Lyn Huang   Intern  Pager [phone]    "

## 2018-07-29 NOTE — PROVIDER NOTIFICATION
-------------------CRITICAL LAB VALUE-------------------    Lab Value: Tacro 21.3  Time of notification: 1:18 PM  MD notified: Dr. Aleman    Patient status: Stable  Temp:  [97.7  F (36.5  C)-98.2  F (36.8  C)] 97.7  F (36.5  C)  Heart Rate:  [] 89  Resp:  [16-18] 16  BP: (123-185)/() 123/85  SpO2:  [99 %-100 %] 100 %  Orders received: pt Tacro currently being held since yesterday 7/28.

## 2018-07-29 NOTE — PLAN OF CARE
"Problem: Patient Care Overview  Goal: Plan of Care/Patient Progress Review  Outcome: No Change  BP (!) 148/107 (BP Location: Left arm)  Pulse 98  Temp 97.9  F (36.6  C) (Oral)  Resp 18  Ht 1.753 m (5' 9\")  Wt 76.7 kg (169 lb 1.5 oz)  SpO2 99%  BMI 24.97 kg/m2  Neuro: A&Ox4.   Cardiac: SR/ST. VSS. Hypertensive  With systolic SBP into the 140's, DBP in 90's-low 100's.    Respiratory: Sating in high 90's on RA  GI/: . BM X1.  Oliguric, on scheduled HD  Diet/appetite: Tolerating regular diet, all meds.  Activity: Independent when OOB  Pain:  PRN tylenol, tramadol for headache.  Skin: No new deficits noted.  LDA's: PIV x1, HD cath    Plan:  Per Cross-Cover, ok to give AM dose of 25mg hydralazine early due to continued hypertension on recheck. Continue with POC. Notify primary team with changes.      Problem: Diabetes Comorbidity  Goal: Diabetes  Patient comorbidity will be monitored for signs and symptoms of hyperglycemia or hypoglycemia. Problems will be absent, minimized or managed by discharge/transition of care.   Outcome: No Change  BG's remain high,  (329 @ HS; 309 on recheck s/p correctional insulin dose.    Problem: Renal Insufficiency Comorbidity  Goal: Renal Insufficiency  Patient comorbidity will be monitored for signs and symptoms of Renal Insufficiency (Chronic) condition.  Problems will be absent, minimized or managed by discharge/transition of care.   Outcome: No Change  Unchanged, oliguric. Scheduled HD.      "

## 2018-07-29 NOTE — PROGRESS NOTES
Advanced Heart Failure and Transplant Cardiology  History and Physical    Assessment and Plan:   Mr. Nicholson is a 63 year old male who is s/p orthotopic heart transplant on 6/14/18 who presents to clinic for new heart transplant visit. Post-op course was complicated leukocytosis for which he received abx, RIJ thrombus infected with CoNS, an incidental pneumoperitoneum. He presents now with neutropenic fevers and is found to have 1 bottle with GNR and a large mouth ulcer growing Pseudomonas/     His Tacrolimus goal is 8 and his level is 21.3. We will continue to hold tac and reassess daily. He is also off of cellcept. He has one bottle with GNR from his dialysis unit. Will continue cefepime and discontinue acyclovir and vancomycin given negative HSV and no evidence of MRSA.     Changes today:   - Stop vancomycin and acyclovir; continue cefepime  - Hold off on neupogen for now  - Await cultures from [facility]. Closed today; will call tomorrow.   - Tacro level still very elevated. Hold for now.   - Add in prandial insulin 1U:10g carbs. If still elevated tomorrow consider Diabetes consult.      # Leukopenic / Neutropenic Fever:  # GNR Bacteremia   # Large necrotic mouth ulcer:  # Mild prececal colitis:  # Pancytopenia   - Ulcer Swab: HSV and VZV PCR pending  - Serum: HSV, VZV, CMV, EBV serum PCR's pending  - empiric cefepime IV, vancomycin IV   - empiric acyclovir 5 mg/kg IV q8 hours  - transplant ID following  - hold off on G-CSF for the time being  - Gram negative rods in bottles from dialysis unit prior to admission. Continue cefepime. Call Dialysis unit daily for     # Status post OHT on 6/14/18, history of NICM  # Donor 3 vessel CAD  # Chronic immunosuppression  - Rejection history: none  - hold Cellcept  - Tacrolimus 4 mg BID, ON HOLD (goal was 10-12, will target 8 for now)  - Prednisone 20 mg / 15 mg  - Next Biopsy: 8/1/18  Prophylaxis:  - PCP: off Bactrim due to leukopenia, pentamidine 7/20/18 and  "every 28 days  - Thrush: Nystatin   - PPI: pantoprazole 40 mg  - CAV: ASA 81 mg, Crestor  - CMV (D+ / R-): holding Valcyte with leukopenia    Serostatus: CMV: D+/R-. EBV: D+/R pending    #Headaches  Ongoing since prior to admission; Head CT negative on admission for bleed. Waxing and waning  - Continue tylenol and tramadol prn     # RIJ Thrombus with CoNS  - continue Eliquis    # ESRD   - listed for renal transplant, EDW 76 kg  - ESRD consult (due Saturday)    Randal Aleman   Internal Medicine Resident  Cardiology Service  [phone]     Discussed with Dr. Muse.  Subjective:   Nursing notes reviewed; No acute overnight events; He is overall feeling better. His ANC continues to drop however. His headache is improved.     Objective:   Blood pressure (!) 151/98, pulse 98, temperature 97.5  F (36.4  C), temperature source Oral, resp. rate 16, height 1.753 m (5' 9\"), weight 76.7 kg (169 lb 1.5 oz), SpO2 100 %.  GENERAL: comfortable, NAD, A&O x 3, energy much improved  HEENT: Eye symmetrical, no discharge or icterus bilaterally. Upper hard palate with 4-5 cm white ulcer with necrotic base  CV: Tachycardic, +S1S2, 2+ ejection murmur, rub, or gallop. JVP just above clavicle at  45 degrees. R-upper chest   RESPIRATORY: Respirations regular, even, and unlabored. Lungs CTA throughout.   GI: Soft and non distended with normoactive bowel sounds present in all quadrants. No tenderness, rebound, guarding. No hepatomegaly.   EXTREMITIES: No peripheral edema. 2+ bilateral pedal pulses.   NEUROLOGIC: Alert and oriented x 3. No focal deficits.   MUSCULOSKELETAL: No joint swelling or tenderness.   SKIN: No jaundice. No rashes or lesions.     Data:   - reviewed all labs and imaging    CMV negative   Parvovirus Pending  ANC of 300.     I have reviewed today's vital signs, notes, medications, labs and imaging.  I have also seen and examined the patient and agree with the findings and plan as outlined above.  Pt feeling better.  No " complaints.  HR 83 RR 16 and afebrile.  Lungs clear and S1 and S2 with no gallop.  Labs with Cr 4.11 and WBC 0.6, Hgb 7.5 with Plt 253.  Assessment: Pt with recent OHT now on steroids (holding prograf and cellcept) with leukopenia, oral ulcer and anemia.  Plan is to culture for parvovirus, hold IS and follow levels and treat for presumed drug induced marrow suppression.  Keep neutropenic precautions.  Cont HD for ESRD.  Plan discussed with ID and pt.     Bobby Muse MD, PhD  Professor, Heart Failure and Cardiac Transplantation  [facility]

## 2018-07-29 NOTE — PROVIDER NOTIFICATION
-------------------CRITICAL LAB VALUE-------------------    Lab Value: ANC 0.3  Time of notification: 11:47 AM  MD notified: Dr. Aleman  Patient status: Stable  Temp:  [97.7  F (36.5  C)-98.4  F (36.9  C)] 97.7  F (36.5  C)  Heart Rate:  [] 89  Resp:  [16-18] 16  BP: (120-185)/() 123/85  SpO2:  [99 %-100 %] 100 %  Orders received: no new orders at this time, pt has been running low.

## 2018-07-29 NOTE — PLAN OF CARE
"Problem: Patient Care Overview  Goal: Plan of Care/Patient Progress Review  Outcome: No Change  Blood pressure 140/84, pulse 98, temperature 98  F (36.7  C), temperature source Oral, resp. rate 16, height 1.753 m (5' 9\"), weight 76.7 kg (169 lb 1.5 oz), SpO2 100 %.    Pt VSS, on RA.  Has c/o headaches throughout the day.  Partially relieved with Tramadol and Tylenol PRN.  1 unit PRBC transfused for Hgb of 6.6.  No recheck in for tonight.  Had HD this morning, tolerated well.  Up independently in room, ambulated in núñez x 1.  Loose stool x 2, minimal UOP.  BG AC+HS, running high.  Increased and extra doses of NPH given as ordered.  Call light within reach.  Continue with plan of care.     Problem: Diabetes Comorbidity  Goal: Diabetes  Patient comorbidity will be monitored for signs and symptoms of hyperglycemia or hypoglycemia. Problems will be absent, minimized or managed by discharge/transition of care.   BG elevated, 278 the lowest.  New orders for increased NPH.     Problem: Renal Insufficiency Comorbidity  Goal: Renal Insufficiency  Patient comorbidity will be monitored for signs and symptoms of Renal Insufficiency (Chronic) condition.  Problems will be absent, minimized or managed by discharge/transition of care.   HD today, VSS, minimal UOP.       "

## 2018-07-30 LAB
ANION GAP SERPL CALCULATED.3IONS-SCNC: 11 MMOL/L (ref 3–14)
ANISOCYTOSIS BLD QL SMEAR: ABNORMAL
ANISOCYTOSIS BLD QL SMEAR: ABNORMAL
BASOPHILS # BLD AUTO: 0 10E9/L (ref 0–0.2)
BASOPHILS # BLD AUTO: 0 10E9/L (ref 0–0.2)
BASOPHILS NFR BLD AUTO: 0 %
BASOPHILS NFR BLD AUTO: 0 %
BUN SERPL-MCNC: 61 MG/DL (ref 7–30)
BURR CELLS BLD QL SMEAR: SLIGHT
CALCIUM SERPL-MCNC: 8 MG/DL (ref 8.5–10.1)
CHLORIDE SERPL-SCNC: 94 MMOL/L (ref 94–109)
CO2 SERPL-SCNC: 25 MMOL/L (ref 20–32)
CREAT SERPL-MCNC: 5.95 MG/DL (ref 0.66–1.25)
DIFFERENTIAL METHOD BLD: ABNORMAL
DIFFERENTIAL METHOD BLD: ABNORMAL
EOSINOPHIL # BLD AUTO: 0 10E9/L (ref 0–0.7)
EOSINOPHIL # BLD AUTO: 0 10E9/L (ref 0–0.7)
EOSINOPHIL NFR BLD AUTO: 0 %
EOSINOPHIL NFR BLD AUTO: 0 %
ERYTHROCYTE [DISTWIDTH] IN BLOOD BY AUTOMATED COUNT: 19.1 % (ref 10–15)
ERYTHROCYTE [DISTWIDTH] IN BLOOD BY AUTOMATED COUNT: 20.9 % (ref 10–15)
GFR SERPL CREATININE-BSD FRML MDRD: 10 ML/MIN/1.7M2
GLUCOSE BLDC GLUCOMTR-MCNC: 182 MG/DL (ref 70–99)
GLUCOSE BLDC GLUCOMTR-MCNC: 255 MG/DL (ref 70–99)
GLUCOSE BLDC GLUCOMTR-MCNC: 255 MG/DL (ref 70–99)
GLUCOSE BLDC GLUCOMTR-MCNC: 330 MG/DL (ref 70–99)
GLUCOSE SERPL-MCNC: 171 MG/DL (ref 70–99)
HAPTOGLOB SERPL-MCNC: 273 MG/DL (ref 35–175)
HCT VFR BLD AUTO: 21.3 % (ref 40–53)
HCT VFR BLD AUTO: 22.4 % (ref 40–53)
HGB BLD-MCNC: 7.2 G/DL (ref 13.3–17.7)
HGB BLD-MCNC: 7.5 G/DL (ref 13.3–17.7)
LYMPHOCYTES # BLD AUTO: 0 10E9/L (ref 0.8–5.3)
LYMPHOCYTES # BLD AUTO: 0.3 10E9/L (ref 0.8–5.3)
LYMPHOCYTES NFR BLD AUTO: 19.3 %
LYMPHOCYTES NFR BLD AUTO: 2 %
MACROCYTES BLD QL SMEAR: PRESENT
MAGNESIUM SERPL-MCNC: 1.9 MG/DL (ref 1.6–2.3)
MCH RBC QN AUTO: 29.3 PG (ref 26.5–33)
MCH RBC QN AUTO: 29.3 PG (ref 26.5–33)
MCHC RBC AUTO-ENTMCNC: 33.5 G/DL (ref 31.5–36.5)
MCHC RBC AUTO-ENTMCNC: 33.8 G/DL (ref 31.5–36.5)
MCV RBC AUTO: 87 FL (ref 78–100)
MCV RBC AUTO: 88 FL (ref 78–100)
MONOCYTES # BLD AUTO: 0 10E9/L (ref 0–1.3)
MONOCYTES # BLD AUTO: 0.3 10E9/L (ref 0–1.3)
MONOCYTES NFR BLD AUTO: 26.6 %
MONOCYTES NFR BLD AUTO: 4 %
MYELOCYTES # BLD: 0 10E9/L
MYELOCYTES NFR BLD MANUAL: 1 %
NEUTROPHILS # BLD AUTO: 0.6 10E9/L (ref 1.6–8.3)
NEUTROPHILS # BLD AUTO: 0.6 10E9/L (ref 1.6–8.3)
NEUTROPHILS NFR BLD AUTO: 49.5 %
NEUTROPHILS NFR BLD AUTO: 66 %
NRBC # BLD AUTO: 0 10*3/UL
NRBC BLD AUTO-RTO: 3 /100
OTHER CELLS # BLD MANUAL: 0.2 10E9/L
OTHER CELLS NFR BLD MANUAL: 27 %
PLATELET # BLD AUTO: 255 10E9/L (ref 150–450)
PLATELET # BLD AUTO: 258 10E9/L (ref 150–450)
PLATELET # BLD EST: ABNORMAL 10*3/UL
PLATELET # BLD EST: ABNORMAL 10*3/UL
POIKILOCYTOSIS BLD QL SMEAR: SLIGHT
POIKILOCYTOSIS BLD QL SMEAR: SLIGHT
POTASSIUM SERPL-SCNC: 4.7 MMOL/L (ref 3.4–5.3)
PROMYELOCYTES # BLD MANUAL: 0.1 10E9/L
PROMYELOCYTES NFR BLD MANUAL: 4.6 %
RBC # BLD AUTO: 2.46 10E12/L (ref 4.4–5.9)
RBC # BLD AUTO: 2.56 10E12/L (ref 4.4–5.9)
SODIUM SERPL-SCNC: 130 MMOL/L (ref 133–144)
TACROLIMUS BLD-MCNC: 11.5 UG/L (ref 5–15)
TME LAST DOSE: NORMAL H
WBC # BLD AUTO: 0.9 10E9/L (ref 4–11)
WBC # BLD AUTO: 1.3 10E9/L (ref 4–11)

## 2018-07-30 PROCEDURE — 25000132 ZZH RX MED GY IP 250 OP 250 PS 637: Mod: GY | Performed by: INTERNAL MEDICINE

## 2018-07-30 PROCEDURE — 25000132 ZZH RX MED GY IP 250 OP 250 PS 637: Mod: GY | Performed by: STUDENT IN AN ORGANIZED HEALTH CARE EDUCATION/TRAINING PROGRAM

## 2018-07-30 PROCEDURE — 80197 ASSAY OF TACROLIMUS: CPT | Performed by: INTERNAL MEDICINE

## 2018-07-30 PROCEDURE — 25000131 ZZH RX MED GY IP 250 OP 636 PS 637: Mod: GY | Performed by: INTERNAL MEDICINE

## 2018-07-30 PROCEDURE — A9270 NON-COVERED ITEM OR SERVICE: HCPCS | Mod: GY | Performed by: STUDENT IN AN ORGANIZED HEALTH CARE EDUCATION/TRAINING PROGRAM

## 2018-07-30 PROCEDURE — 36415 COLL VENOUS BLD VENIPUNCTURE: CPT | Performed by: STUDENT IN AN ORGANIZED HEALTH CARE EDUCATION/TRAINING PROGRAM

## 2018-07-30 PROCEDURE — A9270 NON-COVERED ITEM OR SERVICE: HCPCS | Mod: GY | Performed by: INTERNAL MEDICINE

## 2018-07-30 PROCEDURE — 25000125 ZZHC RX 250: Performed by: STUDENT IN AN ORGANIZED HEALTH CARE EDUCATION/TRAINING PROGRAM

## 2018-07-30 PROCEDURE — 85025 COMPLETE CBC W/AUTO DIFF WBC: CPT | Performed by: STUDENT IN AN ORGANIZED HEALTH CARE EDUCATION/TRAINING PROGRAM

## 2018-07-30 PROCEDURE — 99233 SBSQ HOSP IP/OBS HIGH 50: CPT | Mod: GC | Performed by: INTERNAL MEDICINE

## 2018-07-30 PROCEDURE — 25000128 H RX IP 250 OP 636: Performed by: STUDENT IN AN ORGANIZED HEALTH CARE EDUCATION/TRAINING PROGRAM

## 2018-07-30 PROCEDURE — 80048 BASIC METABOLIC PNL TOTAL CA: CPT | Performed by: STUDENT IN AN ORGANIZED HEALTH CARE EDUCATION/TRAINING PROGRAM

## 2018-07-30 PROCEDURE — 83735 ASSAY OF MAGNESIUM: CPT | Performed by: STUDENT IN AN ORGANIZED HEALTH CARE EDUCATION/TRAINING PROGRAM

## 2018-07-30 PROCEDURE — 00000146 ZZHCL STATISTIC GLUCOSE BY METER IP

## 2018-07-30 PROCEDURE — 12000006 ZZH R&B IMCU INTERMEDIATE UMMC

## 2018-07-30 RX ORDER — HEPARIN SODIUM 1000 [USP'U]/ML
500 INJECTION, SOLUTION INTRAVENOUS; SUBCUTANEOUS CONTINUOUS
Status: DISCONTINUED | OUTPATIENT
Start: 2018-07-30 | End: 2018-07-31

## 2018-07-30 RX ORDER — HEPARIN SODIUM 1000 [USP'U]/ML
500 INJECTION, SOLUTION INTRAVENOUS; SUBCUTANEOUS
Status: COMPLETED | OUTPATIENT
Start: 2018-07-30 | End: 2018-07-31

## 2018-07-30 RX ADMIN — PANTOPRAZOLE SODIUM 40 MG: 40 TABLET, DELAYED RELEASE ORAL at 08:12

## 2018-07-30 RX ADMIN — ROSUVASTATIN CALCIUM 20 MG: 10 TABLET, FILM COATED ORAL at 08:11

## 2018-07-30 RX ADMIN — ISOSORBIDE DINITRATE 10 MG: 10 TABLET ORAL at 08:12

## 2018-07-30 RX ADMIN — APIXABAN 2.5 MG: 2.5 TABLET, FILM COATED ORAL at 20:38

## 2018-07-30 RX ADMIN — ASPIRIN 81 MG: 81 TABLET, COATED ORAL at 20:38

## 2018-07-30 RX ADMIN — Medication 50 MG: at 08:30

## 2018-07-30 RX ADMIN — INSULIN HUMAN 33 UNITS: 100 INJECTION, SUSPENSION SUBCUTANEOUS at 09:10

## 2018-07-30 RX ADMIN — ACETAMINOPHEN 650 MG: 325 TABLET, FILM COATED ORAL at 16:40

## 2018-07-30 RX ADMIN — FLUTICASONE PROPIONATE 2 SPRAY: 50 SPRAY, METERED NASAL at 08:13

## 2018-07-30 RX ADMIN — INSULIN ASPART 5 UNITS: 100 INJECTION, SOLUTION INTRAVENOUS; SUBCUTANEOUS at 13:46

## 2018-07-30 RX ADMIN — INSULIN ASPART 2 UNITS: 100 INJECTION, SOLUTION INTRAVENOUS; SUBCUTANEOUS at 08:11

## 2018-07-30 RX ADMIN — LIDOCAINE 1 PATCH: 560 PATCH PERCUTANEOUS; TOPICAL; TRANSDERMAL at 20:37

## 2018-07-30 RX ADMIN — NYSTATIN 1000000 UNITS: 100000 SUSPENSION ORAL at 08:12

## 2018-07-30 RX ADMIN — NYSTATIN 1000000 UNITS: 100000 SUSPENSION ORAL at 16:32

## 2018-07-30 RX ADMIN — Medication 50 MG: at 20:47

## 2018-07-30 RX ADMIN — NYSTATIN 1000000 UNITS: 100000 SUSPENSION ORAL at 20:38

## 2018-07-30 RX ADMIN — INSULIN HUMAN 18 UNITS: 100 INJECTION, SUSPENSION SUBCUTANEOUS at 18:56

## 2018-07-30 RX ADMIN — HYDRALAZINE HYDROCHLORIDE 50 MG: 50 TABLET ORAL at 08:12

## 2018-07-30 RX ADMIN — NYSTATIN 1000000 UNITS: 100000 SUSPENSION ORAL at 12:23

## 2018-07-30 RX ADMIN — INSULIN ASPART 5 UNITS: 100 INJECTION, SOLUTION INTRAVENOUS; SUBCUTANEOUS at 20:34

## 2018-07-30 RX ADMIN — HYDRALAZINE HYDROCHLORIDE 50 MG: 50 TABLET ORAL at 13:46

## 2018-07-30 RX ADMIN — HYDRALAZINE HYDROCHLORIDE 50 MG: 50 TABLET ORAL at 20:38

## 2018-07-30 RX ADMIN — CEFEPIME HYDROCHLORIDE 1 G: 1 INJECTION, POWDER, FOR SOLUTION INTRAMUSCULAR; INTRAVENOUS at 16:33

## 2018-07-30 RX ADMIN — ISOSORBIDE DINITRATE 10 MG: 10 TABLET ORAL at 16:32

## 2018-07-30 RX ADMIN — ISOSORBIDE DINITRATE 10 MG: 10 TABLET ORAL at 12:17

## 2018-07-30 RX ADMIN — Medication 5 MG: at 08:12

## 2018-07-30 RX ADMIN — PREDNISONE 15 MG: 5 TABLET ORAL at 20:38

## 2018-07-30 RX ADMIN — PREDNISONE 20 MG: 20 TABLET ORAL at 08:12

## 2018-07-30 RX ADMIN — APIXABAN 2.5 MG: 2.5 TABLET, FILM COATED ORAL at 08:12

## 2018-07-30 ASSESSMENT — PAIN DESCRIPTION - DESCRIPTORS
DESCRIPTORS: HEADACHE
DESCRIPTORS: HEADACHE
DESCRIPTORS: ACHING

## 2018-07-30 ASSESSMENT — ACTIVITIES OF DAILY LIVING (ADL)
ADLS_ACUITY_SCORE: 11

## 2018-07-30 NOTE — PROGRESS NOTES
Nephrology Progress Note  07/30/2018       ASSESSMENT AND RECOMMENDATIONS:   Murray Nicholson is a 63 yr old male with PMH of HTN, DMII, CM s/p heart transplant (6/14/2018), ESRD, admitted with neutropenic fever and mouth ulcer concerning for HSV vs other virus, also GNR cultured from CVC 7/26. Found to have Pseudomonas aruginosa per mouth ulcer and CVC.      ESKD: due to DMII, dialyzes TTS schedule at [facility] under care of Dr Mcpherson. Access: tunneled RIJ (replaced 4/17/18). Run time: 4 hrs; EDW 76 kg. Is listed for transplant.  - Dialysis per TTS schedule   - Use low dose heparin on HD  - Heparin lock CVC       Volume: EDW 76 kg.  Oxygen 98% on RA  - Daily standing weights          Anemia: hgb 7.5, s/p 1 unit PRBCs, recent labs with ferritin 876, IS 33, iron 74, hgb 7's; on epogen 7600 units per HD (recently increased).  WBC is 0.9 and Cellcept was stopped on admission .  Plts normal.  Hapto wnl, retic % 1.5, not appropriately elevated, parvo pending  - will increase epogen to 10,000 units per HD  - transfuse per primary team      BMD: calcium 8.4, alb 2.3, phos 3.0; recent ; currently not on Vit D analogue or phos binder      S/p OHT: on 6/14/18, on tacrolimus and pred with transplant team and transplant ID adjusting doses in setting of elevated tacrolimus level and leukopenia/neutropenia and concern for infection.  MMF stopped, valcyte on hold.      Pseudomonas aruginosa sepsis: cultured from mouth ulcer and CVC; neutropenic; blood cx drawn at outpt dialysis unit on 7/26 two out of two with Pseudomonas auruginosa; mouth ulcer neg for HCV, positive for Pseudomonas aruginosa; CMV, EBV, varicella negative; transplant ID following. On cefepime  -  Mouth ulcer positive for Pseudomonas aruginosa; on cefepime  - CVC cx drawn at outpt dialysis unit on 7/26, two out of two with Pseudomonas aruginosa (sensitivities faxed and in the chart; pan-sensitive, LILIAN cefepime 4, levofloxacin 1, cipro 0.25)  -  Per ID,  "ok to treat through line infection    Recommendations were communicated to primary team via this note and phone conversation.      MAYTE Garcia  Division of Kidney Disease  Pager [phone]    Interval History :   Seen bedside, mouth ulcer with Pseudomonas as well as CVC with Pseudomonas; discussed with ID and they recommend treating through CVC infection; pt is feeling much better and denies CP, SOB, n/v, chills     Review of Systems:   4 point ROS negative other than as noted above.    Physical Exam:   I/O last 3 completed shifts:  In: 750 [P.O.:650; I.V.:100]  Out: -    BP (!) 146/100 (BP Location: Left arm)  Pulse 85  Temp 97.6  F (36.4  C) (Oral)  Resp 18  Ht 1.753 m (5' 9\")  Wt 80.4 kg (177 lb 3.2 oz)  SpO2 100%  BMI 26.17 kg/m2     GENERAL APPEARANCE: alert, NAD  EYES:  no scleral icterus, pupils equal  HENT: mouth without ulcers or lesions  PULM: lungs clear to auscultation  CV: regular rhythm, normal rate      -JVD no     -edema no   GI: soft, nontender  INTEGUMENT: no cyanosis, no rash  Access tunneled catheter    Labs:   All labs reviewed by me  Electrolytes/Renal -   Recent Labs   Lab Test  07/30/18   0605  07/29/18   0523  07/28/18   0716  07/27/18   0624   07/23/18   0914   07/18/18   0841   NA  130*  130*  125*  129*   < >  130*   < >  137   POTASSIUM  4.7  5.0  4.3  4.2   < >  5.3   < >  5.7*   CHLORIDE  94  93*  93*  96   < >  99   < >  101   CO2  25  29  18*  21   < >  18*   < >  27   BUN  61*  40*  63*  36*   < >  68*   < >  48*   CR  5.95*  4.11*  6.17*  4.19*   < >  7.09*   < >  4.82*   GLC  171*  195*  309*  202*   < >  285*   < >  145*   TRINI  8.0*  8.1*  8.3*  7.8*   < >  8.6   < >  8.7   MAG  1.9  1.6  1.7  1.4*   --   1.7   --   1.6   PHOS   --    --    --   3.0   --   2.5   --   2.2*    < > = values in this interval not displayed.       CBC -   Recent Labs   Lab Test  07/30/18   0605  07/29/18   0523  07/28/18   1302  07/28/18   0716   WBC  1.3*  0.6*   --   0.9*   HGB  7.5* "  7.5*  6.6*  6.7*   PLT  258  253   --   239       LFTs -   Recent Labs   Lab Test  07/29/18   0523  07/26/18   1732  07/18/18   0841   ALKPHOS  106  95  140   BILITOTAL  0.4  0.7  0.5   ALT  21  19  24   AST  26  18  14   PROTTOTAL  5.2*  6.1*  6.3*   ALBUMIN  1.9*  2.3*  2.9*       Iron Panel -   Recent Labs   Lab Test  07/10/18   0711  05/08/18   0954  07/19/17   1306   IRON  66  58  46   IRONSAT  28  27  18   ALBERTINA  771*  621*  369         Imaging:  Normal chest CT and head CT 7/26    Current Medications:    apixaban ANTICOAGULANT  2.5 mg Oral BID     aspirin  81 mg Oral Daily     biotin  5 mg Oral Daily     ceFEPIme (MAXIPIME) IV  1 g Intravenous Q24H     fluticasone  1-2 spray Both Nostrils Daily     hydrALAZINE  50 mg Oral TID     insulin aspart  1-10 Units Subcutaneous TID AC     insulin aspart  1-7 Units Subcutaneous At Bedtime     insulin aspart   Subcutaneous QAM AC     insulin aspart   Subcutaneous Daily with lunch     insulin aspart   Subcutaneous Daily with supper     insulin isophane human  18 Units Subcutaneous Daily with supper     insulin isophane human  33 Units Subcutaneous QAM AC     isosorbide dinitrate  10 mg Oral TID AC     lidocaine  1 patch Transdermal Q24h    And     lidocaine   Transdermal Q24H    And     lidocaine   Transdermal Q8H     nystatin  1,000,000 Units Swish & Swallow 4x Daily     pantoprazole  40 mg Oral QAM     predniSONE  15 mg Oral QPM     predniSONE  20 mg Oral QAM     rosuvastatin  20 mg Oral Daily     sodium chloride (PF)  3 mL Intravenous Q8H     sodium chloride (PF)  3 mL Intracatheter Q8H       - MEDICATION INSTRUCTIONS -       Kristi Armas PA-C

## 2018-07-30 NOTE — PLAN OF CARE
"Problem: Patient Care Overview  Goal: Plan of Care/Patient Progress Review  Outcome: No Change  BP (!) 164/110  Pulse 98  Temp 98.1  F (36.7  C) (Oral)  Resp 16  Ht 1.753 m (5' 9\")  Wt 76.7 kg (169 lb 1.5 oz)  SpO2 100%  BMI 24.97 kg/m2  Neuro: A&Ox4.   Cardiac: SR/ST. Afebrile.  Pt became increasingly hypertensive this AM.  Notified Cardiology Cross Cover and inquired about giving morning dose of hydralazine or Isordil early. Per provider, continue BP meds per schedule.  Stated he will relay htn issue to day team.    Respiratory: Sating in high 90's on RA  GI/: Oliguric, on T,TH, SAT HD schedule. Small BM x1  Diet/appetite: Tolerating diet. Various forms of insulin per MAR for BG control  Activity:  Independent.  Pain: At acceptable level on current regimen.   Skin: No new deficits noted.  LDA's: PIVx1    Plan: Continue with POC. Notify primary team with changes.      Problem: Diabetes Comorbidity  Goal: Diabetes  Patient comorbidity will be monitored for signs and symptoms of hyperglycemia or hypoglycemia. Problems will be absent, minimized or managed by discharge/transition of care.   Outcome: Improving  BG #'s improving with recent changes to Insulin regimen.    Problem: Renal Insufficiency Comorbidity  Goal: Renal Insufficiency  Patient comorbidity will be monitored for signs and symptoms of Renal Insufficiency (Chronic) condition.  Problems will be absent, minimized or managed by discharge/transition of care.   Outcome: No Change  Unchanged.      "

## 2018-07-30 NOTE — PROGRESS NOTES
[facility] Cardiology Progress Note           Assessment and Plan:   Mr. Nicholson is a 63 year old male who is s/p orthotopic heart transplant on 6/14/18 who presents to clinic for new heart transplant visit. Post-op course was complicated leukocytosis for which he received abx, RIJ thrombus infected with CoNS, an incidental pneumoperitoneum. He presents now with neutropenic fevers and is found to have 1 bottle with GNR and a large mouth ulcer growing Pseudomonas/      His Tacrolimus goal is 8 and his level is 21.3. We will continue to hold tac and reassess daily. He is also off of cellcept. He has one bottle with GNR from his dialysis unit. Will continue cefepime and discontinue acyclovir and vancomycin given negative HSV and no evidence of MRSA.      Changes today:   - [facility] Bcx positive for pseudomonas, similar sensitivities to those found on oral cultures here  - Will continue cefepime per ID, treat for 14 days from first negative blood culture (7/26)  - Parvo B19 PCR pending     # Leukopenic / Neutropenic Fever  # GNR Bacteremia   # Large necrotic mouth ulcer  # Mild prececal colitis  - Ulcer Swab: HSV and VZV PCR pending  - Serum: HSV, VZV, CMV, EBV serum PCR's pending  - empiric cefepime IV, vancomycin IV   - empiric acyclovir 5 mg/kg IV q8 hours  - Continue cefepime    # Leukopenia  Improving today in setting of decreasing tacro level.  While this is only correlation would suspect that recent supra-therapeutic tacro level resulted in in leukopenia.  Chronic anemia likely secondary to ESRD, not having thrombocytopenia.     # Status post OHT on 6/14/18, history of NICM  # Donor 3 vessel CAD  # Chronic immunosuppression  - Rejection history: none  - hold Cellcept  - Tacrolimus 4 mg BID, ON HOLD (goal was 10-12, will target 8 for now)  - Prednisone 20 mg / 15 mg  - Next Biopsy: 8/1/18  Prophylaxis:  - PCP: off Bactrim due to leukopenia, pentamidine 7/20/18 and every 28 days  - Thrush: Nystatin   - PPI:  pantoprazole 40 mg  - CAV: ASA 81 mg, Crestor  - CMV (D+ / R-): holding Valcyte with leukopenia     Serostatus: CMV: D+/R-. EBV: D+/R pending     #Headaches  Ongoing since prior to admission; Head CT negative on admission for bleed. Waxing and waning  - Continue tylenol and tramadol prn      # RIJ Thrombus with CoNS  - continue Eliquis     # ESRD   - listed for renal transplant, EDW 76 kg    Bobby Bearden MD PGY-3  Internal Medicine Resident  [phone]    Patient was seen by and the above plan discussed with Dr. Muse.         Subjective:   Overnight no acute events.  States feeling well, appetite is good.  Notes his mouth ulcer is unchanged.  Patient denies current chest pain, dyspnea on exertion, orthopnea, PND, lightheadedness, or palpitations.           Medications:       apixaban ANTICOAGULANT  2.5 mg Oral BID     aspirin  81 mg Oral Daily     biotin  5 mg Oral Daily     ceFEPIme (MAXIPIME) IV  1 g Intravenous Q24H     fluticasone  1-2 spray Both Nostrils Daily     hydrALAZINE  50 mg Oral TID     insulin aspart  1-10 Units Subcutaneous TID AC     insulin aspart  1-7 Units Subcutaneous At Bedtime     insulin aspart   Subcutaneous QAM AC     insulin aspart   Subcutaneous Daily with lunch     insulin aspart   Subcutaneous Daily with supper     insulin isophane human  18 Units Subcutaneous Daily with supper     insulin isophane human  33 Units Subcutaneous QAM AC     isosorbide dinitrate  10 mg Oral TID AC     lidocaine  1 patch Transdermal Q24h    And     lidocaine   Transdermal Q24H    And     lidocaine   Transdermal Q8H     nystatin  1,000,000 Units Swish & Swallow 4x Daily     pantoprazole  40 mg Oral QAM     predniSONE  15 mg Oral QPM     predniSONE  20 mg Oral QAM     rosuvastatin  20 mg Oral Daily     sodium chloride (PF)  3 mL Intravenous Q8H     sodium chloride (PF)  3 mL Intracatheter Q8H       - MEDICATION INSTRUCTIONS -                 Objective:          Physical Exam:   Temp:  [97.5  F (36.4   C)-98.1  F (36.7  C)] 98.1  F (36.7  C)  Heart Rate:  [83-92] 85  Resp:  [16-18] 16  BP: (123-185)/() 164/110  SpO2:  [99 %-100 %] 100 %  I/O last 3 completed shifts:  In: 750 [P.O.:650; I.V.:100]  Out: -     Vitals:    07/26/18 1708 07/26/18 2335 07/28/18 0721 07/28/18 0812   Weight: 77.7 kg (171 lb 4.8 oz) 77.9 kg (171 lb 11.2 oz) 79.2 kg (174 lb 9.7 oz) 79.2 kg (174 lb 9.7 oz)    07/28/18 1156   Weight: 76.7 kg (169 lb 1.5 oz)       GEN: Interactive, appears comfortable, NAD.  HEENT:  Normocephalic/atraumatic, no scleral icterus, no nasal discharge, OP notable for non-draining ulcer on upper palate with necrotic-appearing base  CV:  Regular rate and rhythm, no murmur or JVD.   LUNGS:  On room air, normal work of breathing, Clear to auscultation bilaterally, no wheezes or crackles.   ABD:  Active bowel sounds, soft, non-tender/non-distended.  No rebound/guarding/rigidity.  EXT:  No edema or cyanosis.  Hands/feet warm to touch with good signs of peripheral perfusion.   SKIN:  Dry to touch, no exanthems noted in the visualized areas.  NEURO:  Alert and oriented  PSCYH: Normal mood and affect         Data:   BMP  Recent Labs  Lab 07/30/18  0605 07/29/18  0523 07/28/18  0716 07/27/18  0624   * 130* 125* 129*   POTASSIUM 4.7 5.0 4.3 4.2   CHLORIDE 94 93* 93* 96   TRINI 8.0* 8.1* 8.3* 7.8*   CO2 25 29 18* 21   BUN 61* 40* 63* 36*   CR 5.95* 4.11* 6.17* 4.19*   * 195* 309* 202*     LFTs  Recent Labs  Lab 07/29/18  0523 07/26/18  1732   ALKPHOS 106 95   AST 26 18   ALT 21 19   BILITOTAL 0.4 0.7   PROTTOTAL 5.2* 6.1*   ALBUMIN 1.9* 2.3*      CBC  Recent Labs  Lab 07/30/18  0605 07/29/18  0523  07/28/18  0716  07/27/18  0624   WBC 1.3* 0.6*  --  0.9*  --  1.0*   RBC 2.56* 2.57*  --  2.31*  --  2.09*   HGB 7.5* 7.5*  < > 6.7*  < > 6.1*   HCT 22.4* 22.4*  --  20.1*  --  18.6*   MCV 88 87  --  87  --  89   MCH 29.3 29.2  --  29.0  --  29.2   MCHC 33.5 33.5  --  33.3  --  32.8   RDW 19.1* 19.0*  --  20.3*  --   20.5*    253  --  239  --  168   < > = values in this interval not displayed.  INR  Recent Labs  Lab 07/26/18  1732   INR 1.24*       I have reviewed today's vital signs, notes, medications, labs and imaging.  I have also seen and examined the patient and agree with the findings and plan as outlined above.  Pt feeling fine without complaints.  VSS with HR 85, RR 18 and /113.  Lungs clear and S1 and S2 with benign abd. Labs with Cr 5.95, WBC increased from 0.6 to 1.3, Hgb 7.5.  Assessment: pt with OHT recent now with improving neutropenia most likely related to prograf.  Plan to hold cellcept and prograf today and consider adding prograf tomorrow morning pending differential.     Bobby Muse MD, PhD  Professor, Heart Failure and Cardiac Transplantation  [facility]

## 2018-07-30 NOTE — PROGRESS NOTES
[facility]  Transplant Infectious Disease Progress Note     Patient:  Murray Nicholson, Date of birth 1955, Medical record number [id number]  Date of Visit:  07/30/2018         Assessment and Recommendations:   Recommendations:  - Continue with Cefepime (can be given on dialysis days, no need for PICC line), will treat for 14 days counting from first negative blood culture (7/26).   - Pending parvovirus B19 PCR in blood    Thank you very much for this consultation. Transplant Infectious Disease will sign off. Please call if any questions or concerns.     Assessment:  Pt is a 64 y/o male with a PMH of ischemic/valvular cardiomyopathy s/p OHT (6/14/18, induction w/ solumedrol and maintenance w/ tacrolimus, MMF, and prednisone), ESRD on HD (listed for kidney transplantation), HTN, HLD, and DM2 who presents w/ 3-4 days of fevers and headache and ulcer on the roof of his mouth for 2-3 days.     Infectious Disease issues include:  - Neutropenic fever: Patient with GNR bacteremia and a mouth ulcer growing PSA. GNR in blood was identified as PSA pan sensitive. Continue cefepime for a total of 14 days starting from first negative blood culture (7/26), no need to remove line. HSV PCR neg, VZV PCR neg and CMV PCR neg.     - Leucopenia and neutropenia: Most likely from immunosuppression. Patient with supra therapeutic tacrolimus levels. Will check just in case parvovirus B19 but unlikely to be from a viral infection.     - Colitis on CT abdomen: CT abdomen with findings compatible with colitis, however the patient is asymptomatic, no fevers, n/v, abdominal pain or diarrhea. Having normal regular BMs. Will not pursue colonoscopy at this time to r/o CMV colitis since patient is asymptomatic. Of note, patient is high risk D+/R-, in those cases viremia seems to correlate more with colitis, his CMV PCR blood was neg.    - QTc interval: 451ms 7/26/18  - Viral serostatus & prophylaxis: HSV1-, HSV1+, CMV  D+/R-, EBV D?/R+, VZV+:  - Isolation status: neutropenic precautions    Tal Schultz MD  Internal Medicine, PGY-1  [id number]    Attending attestation:  I have reviewed today's vital signs, medications, labs and imaging. I have discussed the case with Dr. cShultz and agree with the findings, assessment and recommendations.     SETH CHACKO MD  Infectious Diseases Attending  Pager: [phone]        Interval History:   No acute events overnight. Pt reports that his headache is still present but significantly less severe than it had been.  Denies fevers, chill, diarrhea, n/v, or abdominal pain.  Reports that the oral ulcer has not changed but is still not painful.    Transplants:  6/14/2018 (Heart), Postoperative day:  46.  Coordinator Britni Mcknight    Review of Systems:   ROS: 10 point ROS neg other than the symptoms noted above in the interval history.    Patient Active Problem List   Diagnosis     Esophageal reflux     Hypersomnia with sleep apnea     Allergic rhinitis     CKD (chronic kidney disease) stage 5, GFR less than 15 ml/min (H)     Hyperlipidemia LDL goal <100     Hypertension goal BP (blood pressure) < 130/80     Hypertensive cardiopathy     Anemia of chronic disease     Anemia in chronic renal disease     Hypertension     Dyslipidemia     MGUS (monoclonal gammopathy of unknown significance)     Ascending aortic aneurysm (H)     Type 2 diabetes mellitus with diabetic chronic kidney disease (H)     Anemia, iron deficiency     Anemia in stage 5 chronic kidney disease (H)     Heart transplanted (H)     Leukopenia     Heart transplant recipient (H)     Fever            Current Medications & Allergies:       sodium chloride 0.9%  250 mL Intravenous Once in dialysis     sodium chloride 0.9%  300 mL Hemodialysis Machine Once     apixaban ANTICOAGULANT  2.5 mg Oral BID     aspirin  81 mg Oral Daily     biotin  5 mg Oral Daily     ceFEPIme (MAXIPIME) IV  1 g Intravenous Q24H     epoetin emily  (EPOGEN,PROCRIT) inj ESRD  10,000 Units Intravenous Once in dialysis     fluticasone  1-2 spray Both Nostrils Daily     gelatin absorbable  1 each Topical During Hemodialysis (from stock)     heparin (porcine)  500 Units Hemodialysis Machine Once in dialysis     heparin  3 mL Intracatheter During Hemodialysis (from stock)     heparin  3 mL Intracatheter During Hemodialysis (from stock)     hydrALAZINE  50 mg Oral TID     insulin aspart  1-10 Units Subcutaneous TID AC     insulin aspart  1-7 Units Subcutaneous At Bedtime     insulin aspart   Subcutaneous QAM AC     insulin aspart   Subcutaneous Daily with lunch     insulin aspart   Subcutaneous Daily with supper     insulin isophane human  18 Units Subcutaneous Daily with supper     insulin isophane human  33 Units Subcutaneous QAM AC     isosorbide dinitrate  10 mg Oral TID AC     lidocaine  1 patch Transdermal Q24h    And     lidocaine   Transdermal Q24H    And     lidocaine   Transdermal Q8H     nystatin  1,000,000 Units Swish & Swallow 4x Daily     pantoprazole  40 mg Oral QAM     predniSONE  15 mg Oral QPM     predniSONE  20 mg Oral QAM     rosuvastatin  20 mg Oral Daily     sodium chloride (PF)  3 mL Intracatheter During Hemodialysis (from stock)     sodium chloride (PF)  3 mL Intracatheter During Hemodialysis (from stock)     sodium chloride (PF)  3 mL Intravenous Q8H     sodium chloride (PF)  3 mL Intracatheter Q8H       Infusions/Drips:    heparin (porcine)       - MEDICATION INSTRUCTIONS -         Allergies   Allergen Reactions     Norco [Hydrocodone-Acetaminophen] Nausea and Vomiting     Cats      Throat tightness     Isosorbide Other (See Comments)     hypotension     Penicillins Hives     Seasonal Allergies      rhinitis     Shrimp      Throat closes             Physical Exam:   Vitals were reviewed.  All vitals stable.  Patient Vitals for the past 24 hrs:   BP Temp Temp src Pulse Resp SpO2 Weight   07/30/18 1215 (!) 139/103 97.8  F (36.6  C) Oral - 18  99 % -   07/30/18 1100 (!) 146/100 - - - - - 80.4 kg (177 lb 3.2 oz)   07/30/18 0724 (!) 158/113 97.6  F (36.4  C) Oral 85 18 - -   07/30/18 0502 (!) 164/110 - - - - - -   07/30/18 0458 (!) 162/102 98.1  F (36.7  C) Oral - 16 100 % -   07/29/18 2355 (!) 137/96 97.8  F (36.6  C) Oral - 16 100 % -   07/29/18 1904 (!) 145/104 98  F (36.7  C) Oral - 18 99 % -   07/29/18 1520 (!) 151/98 97.5  F (36.4  C) Oral - 16 - -   07/29/18 1424 (!) 146/93 - - - - - -     Ranges for vital signs:  Temp:  [97.5  F (36.4  C)-98.1  F (36.7  C)] 97.8  F (36.6  C)  Pulse:  [85] 85  Heart Rate:  [84-92] 87  Resp:  [16-18] 18  BP: (137-164)/() 139/103  SpO2:  [99 %-100 %] 99 %  Vitals:    07/28/18 0812 07/28/18 1156 07/30/18 1100   Weight: 79.2 kg (174 lb 9.7 oz) 76.7 kg (169 lb 1.5 oz) 80.4 kg (177 lb 3.2 oz)       Physical Examination:  GENERAL:  well-developed, well-nourished, in bed in no acute distress.  HEAD:  Head is normocephalic, atraumatic   EYES:  Eyes have anicteric sclerae without conjunctival injection   ENT:  4-5cm ulcer on the hard palate near the base of the upper teeth with white base and no erythema  NECK:  Supple. No cervical lymphadenopathy  LUNGS:  Clear to auscultation bilateral.   CARDIOVASCULAR:  Regular rate and rhythm with no murmurs, gallops or rubs.  ABDOMEN:  Normal bowel sounds, soft, nontender.  SKIN:  No acute rashes.    NEUROLOGIC:  Grossly nonfocal.         Laboratory Data:     Inflammatory Markers    Recent Labs   Lab Test  07/26/18   2342  07/02/18   0601  06/30/18   0852  07/05/17   1204  05/31/17   1111  05/17/17   1214   01/13/16   1337   SED   --    --    --    --    --    --    --   80*   CRP  270.0*  7.3  12.0*  35.4*  15.9*  39.3*   < >   --     < > = values in this interval not displayed.       Immune Globulin Studies     Recent Labs   Lab Test  05/19/15   1000   IGG  1380   IGM  108   IGA  203       Metabolic Studies       Recent Labs   Lab Test  07/30/18   0605  07/29/18   0523    07/27/18   0624 07/26/18   2222 07/26/18   1735 07/18/18   0841   07/01/18   0609   04/12/17   0935   NA  130*  130*   < >  129*   --    --    < >  137   < >  131*   < >   --    POTASSIUM  4.7  5.0   < >  4.2   --    --    < >  5.7*   < >  4.6   < >   --    CHLORIDE  94  93*   < >  96   --    --    < >  101   < >  97   < >   --    CO2  25  29   < >  21   --    --    < >  27   < >  21   < >   --    ANIONGAP  11  8   < >  12   --    --    < >  8   < >  14   < >   --    BUN  61*  40*   < >  36*   --    --    < >  48*   < >  40*   < >   --    CR  5.95*  4.11*   < >  4.19*   --    --    < >  4.82*   < >  4.64*   < >   --    GFRESTIMATED  10*  15*   < >  14*   --    --    < >  12*   < >  13*   < >   --    GLC  171*  195*   < >  202*   --    --    < >  145*   < >  284*   < >   --    A1C   --    --    --    --    --    --    --   7.0*   --    --    < >   --    TRINI  8.0*  8.1*   < >  7.8*   --    --    < >  8.7   < >  8.4*   < >   --    PHOS   --    --    --   3.0   --    --    < >  2.2*   < >   --    < >   --    MAG  1.9  1.6   < >  1.4*   --    --    < >  1.6   < >   --    < >   --    LACT   --    --    --    --   0.4*  3.2*   --    --    --    --    < >   --    PCAL   --    --    --    --    --    --    --    --    --   1.25   < >  0.88   CKT   --    --    --    --    --    --    --   131   --    --    --   70    < > = values in this interval not displayed.       Hepatic Studies    Recent Labs   Lab Test  07/29/18 0523 07/26/18 1732 07/18/18   0841   06/25/18   0509   05/19/15   1000   BILITOTAL  0.4  0.7  0.5   < >   --    < >  0.4   DBIL  <0.1   --    --    --    --    < >  0.1   ALKPHOS  106  95  140   < >   --    < >  126   PROTTOTAL  5.2*  6.1*  6.3*   --    --    < >  7.1   ALBUMIN  1.9*  2.3*  2.9*   --    --    < >  2.9*   AST  26  18  14   < >   --    < >  15   ALT  21  19  24   < >   --    < >  20   LDH   --    --    --    --   271*   --   252*    < > = values in this interval not displayed.        Pancreatitis testing    Recent Labs   Lab Test  07/18/18   0841  06/14/18   1207   AMYLASE   --   62   TRIG  88   --        Gout Labs      Recent Labs   Lab Test  07/10/18   0711  04/24/18   0530  04/18/18   0642  03/07/18   0833  01/26/18   1446   URIC  6.1  5.8  3.1*  3.1*  3.5       Hematology Studies      Recent Labs   Lab Test  07/30/18   0605 07/29/18   0523   07/28/18   0716   07/27/18   0624  07/26/18   1732 07/23/18   0914   WBC  1.3*  0.6*   --   0.9*   --   1.0*  0.9*  1.5*   ANEU   --   0.3*   --   0.5*   --   0.7*  0.6*  1.3*   ALYM   --   0.1*   --   0.2*   --   0.0*  0.0*  0.1*   DK   --   0.1   --   0.2   --   0.2  0.0  0.1   AEOS   --   0.0   --   0.0   --   0.0  0.0  0.0   HGB  7.5*  7.5*   < >  6.7*   < >  6.1*  7.2*  7.0*   HCT  22.4*  22.4*   --   20.1*   --   18.6*  21.3*  21.2*   PLT  258  253   --   239   --   168  255  216    < > = values in this interval not displayed.       Clotting Studies    Recent Labs   Lab Test  07/26/18 1732 06/29/18   0545  06/28/18   0554  06/27/18   0629  06/26/18   0433   INR  1.24*   --   1.15*  1.10  1.07   PTT   --   30  29  29  29       Iron Testing    Recent Labs   Lab Test  07/30/18   0605 07/28/18   1302   07/10/18   0711   05/08/18   0954   05/05/18   1302   07/19/17   1306   05/19/15   1000   IRON   --    --    --    --   66   --   58   --    --    --   46   < >  30*   FEB   --    --    --    --   238*   --   218*   --    --    --   263   < >  392   IRONSAT   --    --    --    --   28   --   27   --    --    --   18   < >  8*   ALBERTINA   --    --    --    --   771*   --   621*   --    --    --   369   < >  19*   MCV  88   < >   --    < >  92   < >   --    < >   --    < >   --    < >  82   FOLIC   --    --    --    --    --    --    --    --   58.5   --    --    --   12.7   B12   --    --    --    --    --    --    --    --    --    --    --    --   816   HAPT   --    --   273*   --    --    --    --    --    --    --    --    --   320*   RETP    --    --   1.5   --    --    --    --    --    --    --    --    --    --    RETICABSCT   --    --   34.4   --    --    --    --    --    --    --    --    --    --     < > = values in this interval not displayed.       Arterial Blood Gas Testing    Recent Labs   Lab Test  06/16/18   0836  06/16/18   0828  06/16/18   0655  06/16/18   0334  06/15/18   2200  06/15/18   1956   PH   --   7.43  7.43  7.46*  7.47*  7.45   PCO2   --   38  38  35  34*  36   PO2   --   120*  171*  180*  169*  172*   HCO3   --   25  25  25  25  25   O2PER  4L  4L  40  40  40  40  40        Thyroid Studies     Recent Labs   Lab Test  04/16/18   1546  02/21/18   0900  04/12/17   0935  04/09/15   0900  05/15/13   1418   TSH  2.25  3.59  1.26  3.47  2.05       Urine Studies     Recent Labs   Lab Test  07/28/18   0528  07/18/18   0855  06/25/18   1420  02/05/18   0935  02/04/18   2127   URINEPH  5.5  6.0  5.0  5.5  Canceled: Specimen improperly labeled. Laboratory value report is not on this patient.   NITRITE  Negative  Negative  Negative  Negative  Canceled: Specimen improperly labeled. Laboratory value report is not on this patient.*   LEUKEST  Negative  Trace*  Small*  Negative  Canceled: Specimen improperly labeled. Laboratory value report is not on this patient.*   WBCU  5  12*  9*  4*  Canceled: Specimen improperly labeled. Laboratory value report is not on this patient.*       Medication levels    Recent Labs   Lab Test  07/30/18   0605   07/28/18   0716   01/17/18   0900   VANCOMYCIN   --    --   15.4   < >   --    GENT   --    --    --    --   8.2   TACROL  11.5   < >  24.7*   < >   --     < > = values in this interval not displayed.       Body fluid stats    Recent Labs   Lab Test  07/26/18   1804   01/15/18   0000  01/13/18   2330  01/13/18   0030   FTYP   --    --   Peritoneal fluid  Peritoneal fluid  Peritoneal fluid   FCOL   --    --   Colorless  Yellow  Colorless   FAPR   --    --   Cloudy  Cloudy  Slightly Cloudy   FRBC   --     --   << Do Not Report >>   --   << Do Not Report >>   FWBC   --    --   4256  268  2527   FNEU   --    --   91  56  88   FLYM   --    --   2  21   --    FMONO   --    --   6  23  11   FBAS   --    --   1   --   1   GS  Few  Gram positive rods  *  Few  Gram positive cocci  *  Few  Gram negative rods  *  No WBC's seen   < >   --    --    --     < > = values in this interval not displayed.       Microbiology:  Last Culture results with specimen source  Culture Micro   Date Value Ref Range Status   07/28/2018 No growth after 2 days  Preliminary   07/28/2018 No growth after 2 days  Preliminary   07/28/2018 <10,000 colonies/mL  mixed urogenital tiffany    Final   07/26/2018 Light growth  Pseudomonas aeruginosa   (A)  Final   07/26/2018 Plus  Heavy growth  Normal oral tiffany    Final   07/26/2018 No growth after 4 days  Preliminary   07/26/2018 No growth after 4 days  Preliminary   07/18/2018 No growth after 8 days  Preliminary   06/26/2018 No growth  Final   06/26/2018 No growth  Final   06/24/2018 No growth  Final   06/24/2018 No growth  Final   06/14/2018   Preliminary    Culture received and in progress.  Positive AFB results are called as soon as detected.    Final report to follow in 7 to 8 weeks.     06/14/2018   Preliminary    Assayed at [facility] [address] [place] [id number] [phone]   06/14/2018 (A)  Final    On day 2, isolated in broth only:  Coagulase negative Staphylococcus  not isolated or reported on routine culture  Susceptibility testing not routinely done     06/14/2018 (A)  Final    On day 2, isolated in broth only:  Strain 2  Coagulase negative Staphylococcus  Susceptibility testing not routinely done     06/14/2018 not isolated or reported on routine culture  Final   06/14/2018 (A)  Final    These bacteria are part of normal skin tiffany, but on occasion, may be true pathogens.    Clinical correlation must be applied to interpreting this microbiology result.     06/14/2018 No  anaerobes isolated  Final   06/14/2018 Culture negative after 4 weeks  Final   06/14/2018 No growth  Final    Specimen Description   Date Value Ref Range Status   07/28/2018 Blood Left Arm  Final   07/28/2018 Blood Central venous catheter Red port  Final   07/28/2018 Midstream Urine  Final   07/27/2018 Nares  Final   07/26/2018 Mouth Wound  Final   07/26/2018 Mouth Wound  Final   07/26/2018 Blood Left Arm  Final   07/26/2018 Blood Right Arm  Final   07/26/2018 Blood  Final   07/18/2018 Toe Right Great  Final   06/26/2018 Blood Unspecified Site  Final   06/26/2018 Blood Right Hand  Final   06/25/2018 Feces  Final   06/24/2018 Blood Right Hand  Final   06/24/2018 Blood Right Arm  Final   06/15/2018 Nares  Final   06/14/2018   Final    Tissue THROMBIS FROM END OF DIALYSIS CATHETER SPECIMEN 1   06/14/2018   Final    Tissue THROMBIS FROM END OF DIALYSIS CATHETER SPECIMEN 1   06/14/2018   Final    Tissue THROMBIS FROM END OF DIALYSIS CATH SPECIMEN 1   06/14/2018   Final    Tissue THROMBIS FROM END OF DIALYSIS CATH SPECIMEN 1   06/14/2018   Final    Tissue THROMBIS FROM END OF DIALYSIS CATH SPECIMEN 1   06/14/2018   Final    Tissue THROMBIS FROM END OF DIALYSIS CATH SPECIMEN 1        Last check of C difficile  C Diff Toxin B PCR   Date Value Ref Range Status   06/25/2018 Negative NEG^Negative Final     Comment:     Negative: Clostridium difficile target DNA sequences NOT detected, presumed   negative for Clostridium difficile toxin B or the number of bacteria present   may be below the limit of detection for the test.  FDA approved assay performed using [employer] GeneXpert real-time PCR.  A negative result does not exclude actual disease due to Clostridium difficile   and may be due to improper collection, handling and storage of the specimen   or the number of organisms in the specimen is below the detection limit of the   assay.         Infectious Disease Testing     Recent Labs   Lab Test  08/10/15   0729   TREPAB   Negative     Recent Labs   Lab Test  04/16/18   1546  06/07/17   1446  08/10/15   0729   TBRSLT  Negative  Negative  Negative       Virology:  CMV viral loads    Recent Labs   Lab Test  07/26/18   1732  07/18/18   0842  07/10/18   0711   CSPEC  Plasma  Plasma  Plasma, EDTA anticoagulant   CMVLOG  Not Calculated  Not Calculated  Not Calculated       Log IU/mL of CMVQNT   Date Value Ref Range Status   07/26/2018 Not Calculated <2.1 [Log_IU]/mL Final   07/18/2018 Not Calculated <2.1 [Log_IU]/mL Final   07/10/2018 Not Calculated <2.1 [Log_IU]/mL Final       EBV DNA Copies/mL   Date Value Ref Range Status   07/26/2018 EBV DNA Not Detected EBVNEG^EBV DNA Not Detected [Copies]/mL Final   07/18/2018 EBV DNA Not Detected EBVNEG^EBV DNA Not Detected [Copies]/mL Final       Adenovirus Testing    Recent Labs   Lab Test  06/28/18   2148   ADENOVIRUSAG  Negative       Hepatitis B Testing     Recent Labs   Lab Test  07/18/18   0841  06/14/18   0705  04/16/18   1546   AUSAB   --   66.74*  28.18*   HBCAB  Nonreactive   --   Nonreactive   HEPBANG  Nonreactive  Nonreactive  Nonreactive        Hepatitis C Antibody   Date Value Ref Range Status   07/18/2018 Nonreactive NR^Nonreactive Final     Comment:     Assay performance characteristics have not been established for newborns,   infants, and children     04/16/2018 Nonreactive NR^Nonreactive Final     Comment:     Assay performance characteristics have not been established for newborns,   infants, and children         CMV Antibody IgG   Date Value Ref Range Status   06/14/2018 0.2 0.0 - 0.8 AI Final     Comment:     Negative  Antibody index (AI) values reflect qualitative changes in antibody   concentration that cannot be directly associated with clinical condition or   disease state.     04/16/2018 <0.2 0.0 - 0.8 AI Final     Comment:     Negative  Antibody index (AI) values reflect qualitative changes in antibody   concentration that cannot be directly associated with clinical  condition or   disease state.     08/10/2015 0.2 0.0 - 0.8 AI Final     Comment:     Negative   Antibody index (AI) values reflect qualitative changes in antibody   concentration that cannot be directly associated with clinical condition or   disease state.       Varicella Zoster Virus Antibody IgG   Date Value Ref Range Status   04/16/2018 5.7 (H) 0.0 - 0.8 AI Final     Comment:     Positive, suggests prev. exposure and probable immunity  Antibody index (AI) values reflect qualitative changes in antibody   concentration that cannot be directly associated with clinical condition or   disease state.     08/10/2015 (H) 0.0 - 0.8 AI Final    >8.0  Positive, suggests prev. exposure and probable immunity   Antibody index (AI) values reflect qualitative changes in antibody   concentration that cannot be directly associated with clinical condition or   disease state.       EBV Capsid Antibody IgG   Date Value Ref Range Status   06/14/2018 >8.0 (H) 0.0 - 0.8 AI Final     Comment:     Positive, suggests recent or past exposure  Antibody index (AI) values reflect qualitative changes in antibody   concentration that cannot be directly associated with clinical condition or   disease state.     04/16/2018 7.1 (H) 0.0 - 0.8 AI Final     Comment:     Positive, suggests recent or past exposure  Antibody index (AI) values reflect qualitative changes in antibody   concentration that cannot be directly associated with clinical condition or   disease state.     08/10/2015 7.2 (H) 0.0 - 0.8 AI Final     Comment:     Positive, suggests recent or past exposure   Antibody index (AI) values reflect qualitative changes in antibody   concentration that cannot be directly associated with clinical condition or   disease state.       Toxoplasma Antibody IgG   Date Value Ref Range Status   04/16/2018 <3.0 0.0 - 7.1 IU/mL Final     Comment:     Negative- Absence of detectable Toxoplasma gondii IgG antibodies. A negative   result does not rule out  acute infection.  The magnitude of the measured result is not indicative of the amount of   antibody present. The concentrations of anti-Toxoplasma gondii IgG in a given   specimen determined with assays from different manufacturers can vary due to   differences in assay methods and reagent specificity.       Herpes Simplex Virus Type 1 IgG   Date Value Ref Range Status   04/16/2018 0.3 0.0 - 0.8 AI Final     Comment:     No HSV-1 IgG antibodies detected.  Antibody index (AI) values reflect qualitative changes in antibody   concentration that cannot be directly associated with clinical condition or   disease state.       Herpes Simplex Virus Type 2 IgG   Date Value Ref Range Status   04/16/2018 >8.0 (H) 0.0 - 0.8 AI Final     Comment:     Positive.  IgG antibody to HSV-2 detected.  Antibody index (AI) values reflect qualitative changes in antibody   concentration that cannot be directly associated with clinical condition or   disease state.

## 2018-07-30 NOTE — PLAN OF CARE
Problem: Patient Care Overview  Goal: Plan of Care/Patient Progress Review  Outcome: No Change  Neuro: A&Ox4.   Cardiac: SR 80-90's. BP elevated 139-158/.  Respiratory: Sating >92% on RA. Tolerating activity  GI/: Oliguric, voided 3 x's, around 30 ml per time.  BM X1  Diet/appetite: Tolerating carb controlled diet. Eating well. Carb coverage - 1 uint/10 CHO.    Activity:  Independent, up to chair and in halls.  Pain: At acceptable level on current regimen.  Tramadol q12 and tylenol q4.  Skin: Large necrotic mouth ulcer on hard palate.  Area white and slough looking.     LDA's: R PIV. Double lumen Subclavian catheter for HD.     Plan: Continue with POC. Notify primary team with changes.  HD in a.m around 0900.  Tac level back down to 11.5, may restart tac in a.m.

## 2018-07-31 LAB
ANION GAP SERPL CALCULATED.3IONS-SCNC: 10 MMOL/L (ref 3–14)
ANISOCYTOSIS BLD QL SMEAR: ABNORMAL
B19V IGG SER IA-ACNC: 6.84 IV
B19V IGM SER IA-ACNC: 0.17 IV
BASOPHILS # BLD AUTO: 0 10E9/L (ref 0–0.2)
BASOPHILS NFR BLD AUTO: 0 %
BUN SERPL-MCNC: 78 MG/DL (ref 7–30)
BURR CELLS BLD QL SMEAR: SLIGHT
CALCIUM SERPL-MCNC: 8.4 MG/DL (ref 8.5–10.1)
CHLORIDE SERPL-SCNC: 93 MMOL/L (ref 94–109)
CO2 SERPL-SCNC: 24 MMOL/L (ref 20–32)
CREAT SERPL-MCNC: 7.07 MG/DL (ref 0.66–1.25)
DIFFERENTIAL METHOD BLD: ABNORMAL
EOSINOPHIL # BLD AUTO: 0 10E9/L (ref 0–0.7)
EOSINOPHIL NFR BLD AUTO: 0 %
ERYTHROCYTE [DISTWIDTH] IN BLOOD BY AUTOMATED COUNT: 19 % (ref 10–15)
GFR SERPL CREATININE-BSD FRML MDRD: 8 ML/MIN/1.7M2
GLUCOSE BLDC GLUCOMTR-MCNC: 143 MG/DL (ref 70–99)
GLUCOSE BLDC GLUCOMTR-MCNC: 179 MG/DL (ref 70–99)
GLUCOSE BLDC GLUCOMTR-MCNC: 251 MG/DL (ref 70–99)
GLUCOSE BLDC GLUCOMTR-MCNC: 92 MG/DL (ref 70–99)
GLUCOSE SERPL-MCNC: 85 MG/DL (ref 70–99)
HCT VFR BLD AUTO: 24 % (ref 40–53)
HGB BLD-MCNC: 7.9 G/DL (ref 13.3–17.7)
LYMPHOCYTES # BLD AUTO: 0.3 10E9/L (ref 0.8–5.3)
LYMPHOCYTES NFR BLD AUTO: 13.7 %
MAGNESIUM SERPL-MCNC: 1.8 MG/DL (ref 1.6–2.3)
MCH RBC QN AUTO: 28.8 PG (ref 26.5–33)
MCHC RBC AUTO-ENTMCNC: 32.9 G/DL (ref 31.5–36.5)
MCV RBC AUTO: 88 FL (ref 78–100)
METAMYELOCYTES # BLD: 0 10E9/L
METAMYELOCYTES NFR BLD MANUAL: 0.9 %
MONOCYTES # BLD AUTO: 0.4 10E9/L (ref 0–1.3)
MONOCYTES NFR BLD AUTO: 19.1 %
MYELOCYTES # BLD: 0.1 10E9/L
MYELOCYTES NFR BLD MANUAL: 3.6 %
NEUTROPHILS # BLD AUTO: 1.3 10E9/L (ref 1.6–8.3)
NEUTROPHILS NFR BLD AUTO: 62.7 %
NRBC # BLD AUTO: 0.1 10*3/UL
NRBC BLD AUTO-RTO: 3 /100
PHOSPHATE SERPL-MCNC: 2.6 MG/DL (ref 2.5–4.5)
PLATELET # BLD AUTO: 304 10E9/L (ref 150–450)
PLATELET # BLD EST: ABNORMAL 10*3/UL
POIKILOCYTOSIS BLD QL SMEAR: ABNORMAL
POTASSIUM SERPL-SCNC: 4.9 MMOL/L (ref 3.4–5.3)
RBC # BLD AUTO: 2.74 10E12/L (ref 4.4–5.9)
RBC INCLUSIONS BLD: SLIGHT
SODIUM SERPL-SCNC: 127 MMOL/L (ref 133–144)
TACROLIMUS BLD-MCNC: 7.9 UG/L (ref 5–15)
TARGETS BLD QL SMEAR: SLIGHT
TME LAST DOSE: NORMAL H
WBC # BLD AUTO: 2.1 10E9/L (ref 4–11)

## 2018-07-31 PROCEDURE — 25000131 ZZH RX MED GY IP 250 OP 636 PS 637: Mod: GY | Performed by: STUDENT IN AN ORGANIZED HEALTH CARE EDUCATION/TRAINING PROGRAM

## 2018-07-31 PROCEDURE — A9270 NON-COVERED ITEM OR SERVICE: HCPCS | Mod: GY | Performed by: INTERNAL MEDICINE

## 2018-07-31 PROCEDURE — 83735 ASSAY OF MAGNESIUM: CPT | Performed by: STUDENT IN AN ORGANIZED HEALTH CARE EDUCATION/TRAINING PROGRAM

## 2018-07-31 PROCEDURE — 00000146 ZZHCL STATISTIC GLUCOSE BY METER IP

## 2018-07-31 PROCEDURE — 25000125 ZZHC RX 250: Performed by: STUDENT IN AN ORGANIZED HEALTH CARE EDUCATION/TRAINING PROGRAM

## 2018-07-31 PROCEDURE — 25000131 ZZH RX MED GY IP 250 OP 636 PS 637: Mod: GY | Performed by: INTERNAL MEDICINE

## 2018-07-31 PROCEDURE — 90937 HEMODIALYSIS REPEATED EVAL: CPT

## 2018-07-31 PROCEDURE — 25000128 H RX IP 250 OP 636: Performed by: INTERNAL MEDICINE

## 2018-07-31 PROCEDURE — 25000132 ZZH RX MED GY IP 250 OP 250 PS 637: Mod: GY | Performed by: INTERNAL MEDICINE

## 2018-07-31 PROCEDURE — 25000132 ZZH RX MED GY IP 250 OP 250 PS 637: Mod: GY | Performed by: STUDENT IN AN ORGANIZED HEALTH CARE EDUCATION/TRAINING PROGRAM

## 2018-07-31 PROCEDURE — 25000128 H RX IP 250 OP 636: Performed by: STUDENT IN AN ORGANIZED HEALTH CARE EDUCATION/TRAINING PROGRAM

## 2018-07-31 PROCEDURE — 80048 BASIC METABOLIC PNL TOTAL CA: CPT | Performed by: STUDENT IN AN ORGANIZED HEALTH CARE EDUCATION/TRAINING PROGRAM

## 2018-07-31 PROCEDURE — 84100 ASSAY OF PHOSPHORUS: CPT | Performed by: STUDENT IN AN ORGANIZED HEALTH CARE EDUCATION/TRAINING PROGRAM

## 2018-07-31 PROCEDURE — 63400005 ZZH RX 634: Performed by: INTERNAL MEDICINE

## 2018-07-31 PROCEDURE — 12000001 ZZH R&B MED SURG/OB UMMC

## 2018-07-31 PROCEDURE — 99233 SBSQ HOSP IP/OBS HIGH 50: CPT | Mod: GC | Performed by: INTERNAL MEDICINE

## 2018-07-31 PROCEDURE — A9270 NON-COVERED ITEM OR SERVICE: HCPCS | Mod: GY | Performed by: STUDENT IN AN ORGANIZED HEALTH CARE EDUCATION/TRAINING PROGRAM

## 2018-07-31 PROCEDURE — 85025 COMPLETE CBC W/AUTO DIFF WBC: CPT | Performed by: STUDENT IN AN ORGANIZED HEALTH CARE EDUCATION/TRAINING PROGRAM

## 2018-07-31 PROCEDURE — 80197 ASSAY OF TACROLIMUS: CPT | Performed by: INTERNAL MEDICINE

## 2018-07-31 PROCEDURE — 36415 COLL VENOUS BLD VENIPUNCTURE: CPT | Performed by: STUDENT IN AN ORGANIZED HEALTH CARE EDUCATION/TRAINING PROGRAM

## 2018-07-31 RX ORDER — HEPARIN SODIUM 1000 [USP'U]/ML
500 INJECTION, SOLUTION INTRAVENOUS; SUBCUTANEOUS CONTINUOUS
Status: DISCONTINUED | OUTPATIENT
Start: 2018-07-31 | End: 2018-07-31

## 2018-07-31 RX ORDER — MYCOPHENOLATE MOFETIL 250 MG/1
250 CAPSULE ORAL
Status: DISCONTINUED | OUTPATIENT
Start: 2018-07-31 | End: 2018-08-02

## 2018-07-31 RX ORDER — ISOSORBIDE DINITRATE 10 MG/1
20 TABLET ORAL
Status: DISCONTINUED | OUTPATIENT
Start: 2018-07-31 | End: 2018-08-01

## 2018-07-31 RX ORDER — TACROLIMUS 1 MG/1
1 CAPSULE ORAL
Status: DISCONTINUED | OUTPATIENT
Start: 2018-07-31 | End: 2018-08-01

## 2018-07-31 RX ORDER — HEPARIN SODIUM 1000 [USP'U]/ML
500 INJECTION, SOLUTION INTRAVENOUS; SUBCUTANEOUS
Status: DISCONTINUED | OUTPATIENT
Start: 2018-07-31 | End: 2018-07-31

## 2018-07-31 RX ADMIN — HEPARIN SODIUM 3000 UNITS: 1000 INJECTION, SOLUTION INTRAVENOUS; SUBCUTANEOUS at 13:20

## 2018-07-31 RX ADMIN — TACROLIMUS 1 MG: 1 CAPSULE ORAL at 08:37

## 2018-07-31 RX ADMIN — Medication 50 MG: at 22:16

## 2018-07-31 RX ADMIN — HEPARIN SODIUM 500 UNITS: 1000 INJECTION, SOLUTION INTRAVENOUS; SUBCUTANEOUS at 09:25

## 2018-07-31 RX ADMIN — INSULIN HUMAN 33 UNITS: 100 INJECTION, SUSPENSION SUBCUTANEOUS at 08:19

## 2018-07-31 RX ADMIN — NYSTATIN 1000000 UNITS: 100000 SUSPENSION ORAL at 20:17

## 2018-07-31 RX ADMIN — INSULIN ASPART 5 UNITS: 100 INJECTION, SOLUTION INTRAVENOUS; SUBCUTANEOUS at 18:43

## 2018-07-31 RX ADMIN — PREDNISONE 20 MG: 20 TABLET ORAL at 08:19

## 2018-07-31 RX ADMIN — HEPARIN SODIUM 500 UNITS/HR: 1000 INJECTION, SOLUTION INTRAVENOUS; SUBCUTANEOUS at 09:27

## 2018-07-31 RX ADMIN — ROSUVASTATIN CALCIUM 20 MG: 10 TABLET, FILM COATED ORAL at 08:18

## 2018-07-31 RX ADMIN — ISOSORBIDE DINITRATE 20 MG: 10 TABLET ORAL at 17:15

## 2018-07-31 RX ADMIN — INSULIN ASPART 2 UNITS: 100 INJECTION, SOLUTION INTRAVENOUS; SUBCUTANEOUS at 15:19

## 2018-07-31 RX ADMIN — APIXABAN 2.5 MG: 2.5 TABLET, FILM COATED ORAL at 20:17

## 2018-07-31 RX ADMIN — INSULIN HUMAN 18 UNITS: 100 INJECTION, SUSPENSION SUBCUTANEOUS at 18:43

## 2018-07-31 RX ADMIN — FLUTICASONE PROPIONATE 2 SPRAY: 50 SPRAY, METERED NASAL at 08:25

## 2018-07-31 RX ADMIN — ISOSORBIDE DINITRATE 10 MG: 10 TABLET ORAL at 14:06

## 2018-07-31 RX ADMIN — ACETAMINOPHEN 650 MG: 325 TABLET, FILM COATED ORAL at 18:43

## 2018-07-31 RX ADMIN — APIXABAN 2.5 MG: 2.5 TABLET, FILM COATED ORAL at 08:19

## 2018-07-31 RX ADMIN — PREDNISONE 15 MG: 5 TABLET ORAL at 20:17

## 2018-07-31 RX ADMIN — SODIUM CHLORIDE 250 ML: 9 INJECTION, SOLUTION INTRAVENOUS at 09:25

## 2018-07-31 RX ADMIN — NYSTATIN 1000000 UNITS: 100000 SUSPENSION ORAL at 14:06

## 2018-07-31 RX ADMIN — MYCOPHENOLATE MOFETIL 250 MG: 250 CAPSULE ORAL at 18:35

## 2018-07-31 RX ADMIN — HYDRALAZINE HYDROCHLORIDE 50 MG: 50 TABLET ORAL at 20:17

## 2018-07-31 RX ADMIN — ACETAMINOPHEN 650 MG: 325 TABLET, FILM COATED ORAL at 00:35

## 2018-07-31 RX ADMIN — NYSTATIN 1000000 UNITS: 100000 SUSPENSION ORAL at 17:15

## 2018-07-31 RX ADMIN — PANTOPRAZOLE SODIUM 40 MG: 40 TABLET, DELAYED RELEASE ORAL at 08:18

## 2018-07-31 RX ADMIN — ASPIRIN 81 MG: 81 TABLET, COATED ORAL at 20:17

## 2018-07-31 RX ADMIN — TACROLIMUS 1 MG: 1 CAPSULE ORAL at 18:35

## 2018-07-31 RX ADMIN — LIDOCAINE 1 PATCH: 560 PATCH PERCUTANEOUS; TOPICAL; TRANSDERMAL at 20:17

## 2018-07-31 RX ADMIN — SODIUM CHLORIDE 300 ML: 9 INJECTION, SOLUTION INTRAVENOUS at 09:25

## 2018-07-31 RX ADMIN — HYDRALAZINE HYDROCHLORIDE 50 MG: 50 TABLET ORAL at 14:06

## 2018-07-31 RX ADMIN — CEFEPIME HYDROCHLORIDE 1 G: 1 INJECTION, POWDER, FOR SOLUTION INTRAMUSCULAR; INTRAVENOUS at 17:17

## 2018-07-31 RX ADMIN — Medication 5 MG: at 08:18

## 2018-07-31 RX ADMIN — NYSTATIN 1000000 UNITS: 100000 SUSPENSION ORAL at 08:18

## 2018-07-31 RX ADMIN — ACETAMINOPHEN 650 MG: 325 TABLET, FILM COATED ORAL at 04:38

## 2018-07-31 RX ADMIN — EPOETIN ALFA 10000 UNITS: 10000 SOLUTION INTRAVENOUS; SUBCUTANEOUS at 10:50

## 2018-07-31 ASSESSMENT — ACTIVITIES OF DAILY LIVING (ADL)
ADLS_ACUITY_SCORE: 11

## 2018-07-31 ASSESSMENT — PAIN DESCRIPTION - DESCRIPTORS
DESCRIPTORS: HEADACHE

## 2018-07-31 NOTE — PLAN OF CARE
"Problem: Patient Care Overview  Goal: Plan of Care/Patient Progress Review  Outcome: No Change  Care Provided: 0632-1523    Neuro: A&Ox4.   Cardiac: SR. BPs elevated - within range of previous values, SBP<180 for notification parameters. BP (!) 149/110 (BP Location: Left arm)  Pulse 85  Temp 98  F (36.7  C) (Oral)  Resp 18  Ht 1.753 m (5' 9\")  Wt 81.4 kg (179 lb 7.3 oz)  SpO2 99%  BMI 26.5 kg/m2  Respiratory: Sating >95% on RA. Lungs clear throughout; slightly diminished in bases. Denies SOB & MEADE.  GI/: Oliguric on HD ~50cc/4hr. No BM on shift.  Diet/appetite: Tolerating consistent carb diet. Carbs counted for coverage.  Activity:  Up independent in room.  Pain: Pt reports persistent headache that has been present since PTA. PRN tylenol given when available Q4h. PRN tramadol given before bed & lidocaine patch placed on upper back. Pt reports it is tolerable, but \"just constantly there\".  Skin: Oral lesion on roof of mouth - slough. Scars/bruises throughout. No new deficits noted on shift.   IV: PIV x1 - SL. Ti HD cath RUQ.     R: Will continue to monitor pt closely and notify primary team with any changes.      Problem: Infection, Risk/Actual (Adult)  Goal: Identify Related Risk Factors and Signs and Symptoms  Related risk factors and signs and symptoms are identified upon initiation of Human Response Clinical Practice Guideline (CPG).   Outcome: Improving   07/31/18 0503   Infection, Risk/Actual   Related Risk Factors (Infection, Risk/Actual) immunosuppressed;medication effects;treatment plan   Signs and Symptoms (Infection, Risk/Actual) blood glucose changes;heart rate increase;cultures positive;pain;lab value changes       Problem: Diabetes Comorbidity  Goal: Diabetes  Patient comorbidity will be monitored for signs and symptoms of hyperglycemia or hypoglycemia. Problems will be absent, minimized or managed by discharge/transition of care.   Outcome: No Change  BG monitored ACHS - sliding scale & " carb coverage Novalog. NPH insulin AM and with supper.     Recent Labs  Lab 07/30/18  2138 07/30/18  1854 07/30/18  1350 07/30/18  0727 07/30/18  0605 07/29/18  2121 07/29/18  1846  07/29/18  0523  07/28/18  0716  07/27/18  0624  07/26/18  1732   GLC  --   --   --   --  171*  --   --   --  195*  --  309*  --  202*  --  158*   * 255* 255* 182*  --  162* 235*  < >  --   < >  --   < >  --   < >  --    < > = values in this interval not displayed.    Problem: Renal Insufficiency Comorbidity  Goal: Renal Insufficiency  Patient comorbidity will be monitored for signs and symptoms of Renal Insufficiency (Chronic) condition.  Problems will be absent, minimized or managed by discharge/transition of care.   Pt with ESRD - listed for kidney tx on 7/10/17. Pt on HD T/Th/Sa. Monitoring closely.

## 2018-07-31 NOTE — PROGRESS NOTES
HEMODIALYSIS TREATMENT NOTE    Date: 7/31/2018  Time: 1:46 PM    Data:  Pre Wt: 82.5 kg (181 lb 14.1 oz)   Desired Wt: 78.5 kg   Post Wt: 78.2 kg (172 lb 6.4 oz)  Weight gain: -4.3 kg off  Weight change: 4.3 kg  Ultrafiltration - Post Run Net Total Removed (mL): 4000 mL  Ultrafiltration - Post Run Net Total Gain (mL): 0 mL  Vascular Access Status: Yes, secured and visible  Dialyzer Rinse: Streaked, Light  Total Blood Volume Processed: 91.2 L  Total Dialysis (Treatment) Time:  4 hrs    Lab:   NO    Assessment:  Patent cath. Pre run Wt: 82.5 kg (Standing scale), Pre run BP: 161/100 mmHg.    Interventions:  Initiated HD with K2/3 bath . BFR at 400 ml/ mins. Tolerable HD for 4 hrs/ 4 kg off. SBP in 150~170 mmHg during HD.  Finished HD with rinse back , CVC locked with 1:1000 units heparin. (VOl. Specific)     Plan:    Next run per renal team.

## 2018-07-31 NOTE — PROGRESS NOTES
[facility] Cardiology Progress Note           Assessment and Plan:   Mr. Nicholson is a 63 year old male who is s/p orthotopic heart transplant on 6/14/18 who presents to clinic for new heart transplant visit. Post-op course was complicated leukocytosis for which he received abx, RIJ thrombus infected with CoNS, an incidental pneumoperitoneum. He presents now with neutropenic fevers and is found to have 1 bottle with GNR and a large mouth ulcer growing Pseudomonas.     Changes today:   - WBC continues to improve, ANC 1.3 today  - Restarting tacro today at low dose (1 mg BID) and MMF (250 QPM)  - Continue cefepime  - Parvo B19 PCR pending  - Increased isordil from 10 to 20 mg TID     # Pseudomonal bacteremia, resolving  # Large necrotic mouth ulcer  # Mild prececal colitis  [facility], will continue cefepime per ID.  Patient presented with neutropenic fevers, now clinically improving on abx.  Mouth ulcer unchanged on daily exams.  - Continue cefepime for 14 day course    # Leukopenia  Improving today in setting of decreasing tacro level.  While this is only correlation would suspect that recent supra-therapeutic tacro level resulted in in leukopenia.  Chronic anemia likely secondary to ESRD, not having thrombocytopenia.    # Status post OHT on 6/14/18, history of NICM  # Donor 3 vessel CAD  # Chronic immunosuppression  - Rejection history: none  - hold Cellcept  - Tacrolimus 4 mg BID, restarting at 1 mg BID today (goal level was 10-12, will target 8 for now)  - Prednisone 20 mg / 15 mg  - Next Biopsy: due 8/1/18; will likely hold off tomorrow but complete prior to discharge given significant immunosuppression adjustments this admission  Prophylaxis:  - PCP: off Bactrim due to leukopenia, pentamidine 7/20/18 and every 28 days  - Thrush: Nystatin   - PPI: pantoprazole 40 mg  - CAV: ASA 81 mg, Crestor  - CMV (D+ / R-): holding Valcyte with leukopenia     Serostatus: CMV: D+/R-.  EBV: D+/R pending     #HTN  Has had elevated BP with diastolics in the 110s-120s, will increase isordil dose today and continue hydralazine at current dose for now.  - Increase isordil from 10 to 20 mg TID, continue hydralazine 50 mg TID     # RIJ Thrombus with CoNS  - continue Eliquis     # ESRD   - listed for renal transplant, EDW 76 kg    Bobby Bearden MD PGY-3  Internal Medicine Resident  [phone]    Patient was seen by and the above plan discussed with Dr. Muse.         Subjective:   Overnight no acute events.  Continues to feel well, no acute concerns this AM.          Medications:       sodium chloride 0.9%  250 mL Intravenous Once in dialysis     sodium chloride 0.9%  300 mL Hemodialysis Machine Once     sodium chloride 0.9%  250 mL Intravenous Once in dialysis     sodium chloride 0.9%  300 mL Hemodialysis Machine Once     apixaban ANTICOAGULANT  2.5 mg Oral BID     aspirin  81 mg Oral Daily     biotin  5 mg Oral Daily     ceFEPIme (MAXIPIME) IV  1 g Intravenous Q24H     epoetin emily (EPOGEN,PROCRIT) inj ESRD  10,000 Units Intravenous Once in dialysis     fluticasone  1-2 spray Both Nostrils Daily     gelatin absorbable  1 each Topical During Hemodialysis (from stock)     heparin (porcine)  500 Units Hemodialysis Machine Once in dialysis     heparin (porcine)  500 Units Hemodialysis Machine Once in dialysis     heparin  3 mL Intracatheter During Hemodialysis (from stock)     heparin  3 mL Intracatheter During Hemodialysis (from stock)     heparin  3 mL Intracatheter During Hemodialysis (from stock)     heparin  3 mL Intracatheter During Hemodialysis (from stock)     hydrALAZINE  50 mg Oral TID     insulin aspart  1-10 Units Subcutaneous TID AC     insulin aspart  1-7 Units Subcutaneous At Bedtime     insulin aspart   Subcutaneous QAM AC     insulin aspart   Subcutaneous Daily with lunch     insulin aspart   Subcutaneous Daily with supper     insulin isophane human  18 Units Subcutaneous Daily with  supper     insulin isophane human  33 Units Subcutaneous QAM AC     isosorbide dinitrate  10 mg Oral TID AC     lidocaine  1 patch Transdermal Q24h    And     lidocaine   Transdermal Q24H    And     lidocaine   Transdermal Q8H     nystatin  1,000,000 Units Swish & Swallow 4x Daily     pantoprazole  40 mg Oral QAM     predniSONE  15 mg Oral QPM     predniSONE  20 mg Oral QAM     rosuvastatin  20 mg Oral Daily     sodium chloride (PF)  3 mL Intracatheter During Hemodialysis (from stock)     sodium chloride (PF)  3 mL Intracatheter During Hemodialysis (from stock)     sodium chloride (PF)  3 mL Intracatheter During Hemodialysis (from stock)     sodium chloride (PF)  3 mL Intracatheter During Hemodialysis (from stock)     sodium chloride (PF)  3 mL Intracatheter Q8H       heparin (porcine)       heparin (porcine)       - MEDICATION INSTRUCTIONS -                 Objective:          Physical Exam:   Temp:  [97.6  F (36.4  C)-98.1  F (36.7  C)] 98  F (36.7  C)  Pulse:  [85] 85  Heart Rate:  [85-98] 98  Resp:  [18] 18  BP: (139-187)/() 149/110  SpO2:  [99 %-100 %] 99 %  I/O last 3 completed shifts:  In: 1500 [P.O.:1500]  Out: 80 [Urine:80]    Vitals:    07/28/18 0721 07/28/18 0812 07/28/18 1156 07/30/18 1100   Weight: 79.2 kg (174 lb 9.7 oz) 79.2 kg (174 lb 9.7 oz) 76.7 kg (169 lb 1.5 oz) 80.4 kg (177 lb 3.2 oz)    07/31/18 0423   Weight: 81.4 kg (179 lb 7.3 oz)       GEN: Interactive, appears comfortable, NAD.  HEENT:  Normocephalic/atraumatic, no scleral icterus, no nasal discharge, OP notable for non-draining ulcer on upper palate with necrotic-appearing base  CV:  Regular rate and rhythm, no murmur or JVD.   LUNGS:  On room air, normal work of breathing, Clear to auscultation bilaterally, no wheezes or crackles.   ABD:  Active bowel sounds, soft, non-tender/non-distended.  No rebound/guarding/rigidity.  EXT:  No edema or cyanosis.  Hands/feet warm to touch with good signs of peripheral perfusion.   SKIN:  Dry to  touch, no exanthems noted in the visualized areas.  NEURO:  Alert and oriented  PSCYH: Normal mood and affect         Data:   BMP    Recent Labs  Lab 07/31/18 0446 07/30/18 0605 07/29/18 0523 07/28/18  0716   * 130* 130* 125*   POTASSIUM 4.9 4.7 5.0 4.3   CHLORIDE 93* 94 93* 93*   TRINI 8.4* 8.0* 8.1* 8.3*   CO2 24 25 29 18*   BUN 78* 61* 40* 63*   CR 7.07* 5.95* 4.11* 6.17*   GLC 85 171* 195* 309*     LFTs    Recent Labs  Lab 07/29/18 0523 07/26/18  1732   ALKPHOS 106 95   AST 26 18   ALT 21 19   BILITOTAL 0.4 0.7   PROTTOTAL 5.2* 6.1*   ALBUMIN 1.9* 2.3*      CBC  Recent Labs  Lab 07/31/18 0446 07/30/18 0605 07/29/18 0523 07/28/18  0716   WBC 2.1* 1.3* 0.6*  --  0.9*   RBC 2.74* 2.56* 2.57*  --  2.31*   HGB 7.9* 7.5* 7.5*  < > 6.7*   HCT 24.0* 22.4* 22.4*  --  20.1*   MCV 88 88 87  --  87   MCH 28.8 29.3 29.2  --  29.0   MCHC 32.9 33.5 33.5  --  33.3   RDW 19.0* 19.1* 19.0*  --  20.3*    258 253  --  239   < > = values in this interval not displayed.  INR    Recent Labs  Lab 07/26/18  1732   INR 1.24*       I have reviewed today's vital signs, notes, medications, labs and imaging.  I have also seen and examined the patient and agree with the findings and plan as outlined above.  Pt with dialysis today.  98, HR 90s, RR 18 and /100.  ml.  Lungs clear and S1 and S2 with no gallop.  Labs with WBC 2.1, Hgb 7.9.  Assessment: Pt with probable drug induced leukopenia.  Agree with 1 mg of prograf BID and will begin cellcept tonight.  Discussed care with Dr. Ugarte and all are in agreement.     Bobby Muse MD, PhD  Professor, Heart Failure and Cardiac Transplantation  [facility]

## 2018-07-31 NOTE — PROGRESS NOTES
Nephrology Progress Note  07/31/2018       ASSESSMENT AND RECOMMENDATIONS:   Murray Nicholson is a 63 yr old male with PMH of HTN, DMII, CM s/p heart transplant (6/14/2018), ESRD, admitted with neutropenic fever and mouth ulcer concerning for HSV vs other virus, also GNR cultured from CVC 7/26. Found to have Pseudomonas aruginosa per mouth ulcer and CVC.      ESKD: due to DMII, dialyzes TTS schedule at [facility] under care of Dr Mcpherson. Access: tunneled RIJ (replaced 4/17/18). Run time: 4 hrs; EDW 76 kg. Is listed for transplant.  - Dialysis per TTS schedule   - Use low dose heparin on HD  - Heparin lock CVC       Volume: EDW 76 kg. Appears hypervolemic and is ~ 6 kg over EDW. Oxygen 98% on RA  - Daily standing weights  - UF goal 4 kg today          Anemia: hgb 7.9, s/p 1 unit PRBCs, recent labs with ferritin 876, IS 33, iron 74, hgb 7's; on epogen 7600 units per HD (recently increased).  WBC is 0.9 and Cellcept was stopped on admission .  Plts normal.  Hapto wnl, retic % 1.5, not appropriately elevated, parvo pending  - will increase epogen to 10,000 units per HD  - transfuse per primary team      BMD: calcium 8.4, alb 2.3, phos 3.0; recent ; currently not on Vit D analogue or phos binder      S/p OHT: on 6/14/18, on tacrolimus and pred with transplant team and transplant ID adjusting doses in setting of elevated tacrolimus level and leukopenia/neutropenia and concern for infection. Mycophenolate being restarted, valcyte on hold.      Pseudomonas aruginosa sepsis: cultured from mouth ulcer and CVC; neutropenic; blood cx drawn at outpt dialysis unit on 7/26 two out of two with Pseudomonas auruginosa; mouth ulcer neg for HCV, positive for Pseudomonas aruginosa; CMV, EBV, varicella negative; transplant ID following. On cefepime  -  Mouth ulcer positive for Pseudomonas aruginosa; on cefepime  - CVC cx drawn at outpt dialysis unit on 7/26, two out of two with Pseudomonas aruginosa (sensitivities faxed and  "in the chart; pan-sensitive, LILIAN cefepime 4, levofloxacin 1, cipro 0.25)  -  Per ID, ok to treat through line infection, cefepime can be dosed for HD    Recommendations were communicated to primary team via this note and phone conversation.      MAYTE Garcia  Division of Kidney Disease  Pager [phone]    Interval History :   Seen on dialysis, ~ 4 kg UF goal, mouth ulcer with Pseudomonas as well as CVC with Pseudomonas; discussed with ID and they recommend treating through CVC infection; denies CP, SOB, n/v, chills     Review of Systems:   4 point ROS negative other than as noted above.    Physical Exam:   I/O last 3 completed shifts:  In: 1900 [P.O.:1900]  Out: 190 [Urine:190]   BP (!) 177/111  Pulse 85  Temp 98  F (36.7  C) (Oral)  Resp 18  Ht 1.753 m (5' 9\")  Wt 82.5 kg (181 lb 14.1 oz)  SpO2 100%  BMI 26.86 kg/m2     GENERAL APPEARANCE: alert, NAD  EYES:  no scleral icterus, pupils equal  HENT: mouth without ulcers or lesions  PULM: lungs clear to auscultation  CV: regular rhythm, normal rate      -edema trace   GI: soft, nontender  INTEGUMENT: no cyanosis, no rash  Access tunneled RIJ    Labs:   All labs reviewed by me  Electrolytes/Renal -   Recent Labs   Lab Test  07/31/18   0446  07/30/18   0605  07/29/18   0523   07/27/18   0624   07/23/18   0914   07/18/18   0841   NA  127*  130*  130*   < >  129*   < >  130*   < >  137   POTASSIUM  4.9  4.7  5.0   < >  4.2   < >  5.3   < >  5.7*   CHLORIDE  93*  94  93*   < >  96   < >  99   < >  101   CO2  24  25  29   < >  21   < >  18*   < >  27   BUN  78*  61*  40*   < >  36*   < >  68*   < >  48*   CR  7.07*  5.95*  4.11*   < >  4.19*   < >  7.09*   < >  4.82*   GLC  85  171*  195*   < >  202*   < >  285*   < >  145*   TRINI  8.4*  8.0*  8.1*   < >  7.8*   < >  8.6   < >  8.7   MAG  1.8  1.9  1.6   < >  1.4*   --   1.7   --   1.6   PHOS   --    --    --    --   3.0   --   2.5   --   2.2*    < > = values in this interval not displayed.       CBC - "   Recent Labs   Lab Test  07/31/18   0446  07/30/18   0605  07/29/18   0523   WBC  2.1*  1.3*  0.6*   HGB  7.9*  7.5*  7.5*   PLT  304  258  253       LFTs -   Recent Labs   Lab Test  07/29/18   0523  07/26/18   1732  07/18/18   0841   ALKPHOS  106  95  140   BILITOTAL  0.4  0.7  0.5   ALT  21 19  24   AST  26  18  14   PROTTOTAL  5.2*  6.1*  6.3*   ALBUMIN  1.9*  2.3*  2.9*       Iron Panel -   Recent Labs   Lab Test  07/10/18   0711  05/08/18   0954  07/19/17   1306   IRON  66  58  46   IRONSAT  28  27  18   ALBERTINA  771*  621*  369         Imaging:  Normal chest CT and head CT 7/26    Current Medications:    sodium chloride 0.9%  250 mL Intravenous Once in dialysis     sodium chloride 0.9%  300 mL Hemodialysis Machine Once     apixaban ANTICOAGULANT  2.5 mg Oral BID     aspirin  81 mg Oral Daily     biotin  5 mg Oral Daily     ceFEPIme (MAXIPIME) IV  1 g Intravenous Q24H     fluticasone  1-2 spray Both Nostrils Daily     gelatin absorbable  1 each Topical During Hemodialysis (from stock)     heparin (porcine)  500 Units Hemodialysis Machine Once in dialysis     heparin  3 mL Intracatheter During Hemodialysis (from stock)     heparin  3 mL Intracatheter During Hemodialysis (from stock)     heparin  3 mL Intracatheter During Hemodialysis (from stock)     heparin  3 mL Intracatheter During Hemodialysis (from stock)     hydrALAZINE  50 mg Oral TID     insulin aspart  1-10 Units Subcutaneous TID AC     insulin aspart  1-7 Units Subcutaneous At Bedtime     insulin aspart   Subcutaneous QAM AC     insulin aspart   Subcutaneous Daily with lunch     insulin aspart   Subcutaneous Daily with supper     insulin isophane human  18 Units Subcutaneous Daily with supper     insulin isophane human  33 Units Subcutaneous QAM AC     isosorbide dinitrate  10 mg Oral TID AC     lidocaine  1 patch Transdermal Q24h    And     lidocaine   Transdermal Q24H    And     lidocaine   Transdermal Q8H     mycophenolate  250 mg Oral BID IS      nystatin  1,000,000 Units Swish & Swallow 4x Daily     pantoprazole  40 mg Oral QAM     predniSONE  15 mg Oral QPM     predniSONE  20 mg Oral QAM     rosuvastatin  20 mg Oral Daily     sodium chloride (PF)  3 mL Intracatheter During Hemodialysis (from stock)     sodium chloride (PF)  3 mL Intracatheter During Hemodialysis (from stock)     sodium chloride (PF)  3 mL Intracatheter During Hemodialysis (from stock)     sodium chloride (PF)  3 mL Intracatheter During Hemodialysis (from stock)     sodium chloride (PF)  3 mL Intracatheter Q8H     tacrolimus  1 mg Oral BID IS       heparin (porcine)       heparin (porcine) 500 Units/hr (07/31/18 0927)     - MEDICATION INSTRUCTIONS -       Kristi Armas PA-C

## 2018-08-01 LAB
ANION GAP SERPL CALCULATED.3IONS-SCNC: 8 MMOL/L (ref 3–14)
ANISOCYTOSIS BLD QL SMEAR: SLIGHT
B19V DNA SER QL NAA+PROBE: NOT DETECTED
BACTERIA SPEC CULT: NO GROWTH
BACTERIA SPEC CULT: NO GROWTH
BASOPHILS # BLD AUTO: 0 10E9/L (ref 0–0.2)
BASOPHILS NFR BLD AUTO: 0 %
BUN SERPL-MCNC: 38 MG/DL (ref 7–30)
CALCIUM SERPL-MCNC: 8.6 MG/DL (ref 8.5–10.1)
CHLORIDE SERPL-SCNC: 93 MMOL/L (ref 94–109)
CO2 SERPL-SCNC: 30 MMOL/L (ref 20–32)
CREAT SERPL-MCNC: 4.53 MG/DL (ref 0.66–1.25)
DIFFERENTIAL METHOD BLD: ABNORMAL
EOSINOPHIL # BLD AUTO: 0 10E9/L (ref 0–0.7)
EOSINOPHIL NFR BLD AUTO: 0 %
ERYTHROCYTE [DISTWIDTH] IN BLOOD BY AUTOMATED COUNT: 19.4 % (ref 10–15)
GFR SERPL CREATININE-BSD FRML MDRD: 13 ML/MIN/1.7M2
GLUCOSE BLDC GLUCOMTR-MCNC: 147 MG/DL (ref 70–99)
GLUCOSE BLDC GLUCOMTR-MCNC: 174 MG/DL (ref 70–99)
GLUCOSE BLDC GLUCOMTR-MCNC: 272 MG/DL (ref 70–99)
GLUCOSE BLDC GLUCOMTR-MCNC: 61 MG/DL (ref 70–99)
GLUCOSE SERPL-MCNC: 74 MG/DL (ref 70–99)
HCT VFR BLD AUTO: 26.7 % (ref 40–53)
HGB BLD-MCNC: 8.7 G/DL (ref 13.3–17.7)
LYMPHOCYTES # BLD AUTO: 0.3 10E9/L (ref 0.8–5.3)
LYMPHOCYTES NFR BLD AUTO: 10 %
Lab: NORMAL
Lab: NORMAL
MAGNESIUM SERPL-MCNC: 1.6 MG/DL (ref 1.6–2.3)
MCH RBC QN AUTO: 28.9 PG (ref 26.5–33)
MCHC RBC AUTO-ENTMCNC: 32.6 G/DL (ref 31.5–36.5)
MCV RBC AUTO: 89 FL (ref 78–100)
MONOCYTES # BLD AUTO: 0.5 10E9/L (ref 0–1.3)
MONOCYTES NFR BLD AUTO: 15.5 %
MYELOCYTES # BLD: 0.1 10E9/L
MYELOCYTES NFR BLD MANUAL: 3.6 %
NEUTROPHILS # BLD AUTO: 2.4 10E9/L (ref 1.6–8.3)
NEUTROPHILS NFR BLD AUTO: 70.9 %
NRBC # BLD AUTO: 0.1 10*3/UL
NRBC BLD AUTO-RTO: 2 /100
PLATELET # BLD AUTO: 326 10E9/L (ref 150–450)
PLATELET # BLD EST: ABNORMAL 10*3/UL
POIKILOCYTOSIS BLD QL SMEAR: SLIGHT
POTASSIUM SERPL-SCNC: 4.2 MMOL/L (ref 3.4–5.3)
RBC # BLD AUTO: 3.01 10E12/L (ref 4.4–5.9)
SODIUM SERPL-SCNC: 131 MMOL/L (ref 133–144)
SPECIMEN SOURCE: NORMAL
TACROLIMUS BLD-MCNC: 5.3 UG/L (ref 5–15)
TME LAST DOSE: NORMAL H
WBC # BLD AUTO: 3.4 10E9/L (ref 4–11)

## 2018-08-01 PROCEDURE — 80197 ASSAY OF TACROLIMUS: CPT | Performed by: INTERNAL MEDICINE

## 2018-08-01 PROCEDURE — A9270 NON-COVERED ITEM OR SERVICE: HCPCS | Mod: GY | Performed by: STUDENT IN AN ORGANIZED HEALTH CARE EDUCATION/TRAINING PROGRAM

## 2018-08-01 PROCEDURE — 25000132 ZZH RX MED GY IP 250 OP 250 PS 637: Mod: GY | Performed by: STUDENT IN AN ORGANIZED HEALTH CARE EDUCATION/TRAINING PROGRAM

## 2018-08-01 PROCEDURE — 25000131 ZZH RX MED GY IP 250 OP 636 PS 637: Mod: GY | Performed by: STUDENT IN AN ORGANIZED HEALTH CARE EDUCATION/TRAINING PROGRAM

## 2018-08-01 PROCEDURE — 36415 COLL VENOUS BLD VENIPUNCTURE: CPT | Performed by: INTERNAL MEDICINE

## 2018-08-01 PROCEDURE — 25000131 ZZH RX MED GY IP 250 OP 636 PS 637: Mod: GY | Performed by: HOSPITALIST

## 2018-08-01 PROCEDURE — 83735 ASSAY OF MAGNESIUM: CPT | Performed by: INTERNAL MEDICINE

## 2018-08-01 PROCEDURE — 25000132 ZZH RX MED GY IP 250 OP 250 PS 637: Mod: GY | Performed by: INTERNAL MEDICINE

## 2018-08-01 PROCEDURE — 80048 BASIC METABOLIC PNL TOTAL CA: CPT | Performed by: INTERNAL MEDICINE

## 2018-08-01 PROCEDURE — 25000128 H RX IP 250 OP 636: Performed by: STUDENT IN AN ORGANIZED HEALTH CARE EDUCATION/TRAINING PROGRAM

## 2018-08-01 PROCEDURE — A9270 NON-COVERED ITEM OR SERVICE: HCPCS | Mod: GY | Performed by: INTERNAL MEDICINE

## 2018-08-01 PROCEDURE — 85025 COMPLETE CBC W/AUTO DIFF WBC: CPT | Performed by: INTERNAL MEDICINE

## 2018-08-01 PROCEDURE — 99233 SBSQ HOSP IP/OBS HIGH 50: CPT | Performed by: NURSE PRACTITIONER

## 2018-08-01 PROCEDURE — 00000146 ZZHCL STATISTIC GLUCOSE BY METER IP

## 2018-08-01 PROCEDURE — 25000125 ZZHC RX 250: Performed by: STUDENT IN AN ORGANIZED HEALTH CARE EDUCATION/TRAINING PROGRAM

## 2018-08-01 PROCEDURE — 87496 CYTOMEG DNA AMP PROBE: CPT | Performed by: INTERNAL MEDICINE

## 2018-08-01 PROCEDURE — 12000001 ZZH R&B MED SURG/OB UMMC

## 2018-08-01 PROCEDURE — A9270 NON-COVERED ITEM OR SERVICE: HCPCS | Mod: GY | Performed by: NURSE PRACTITIONER

## 2018-08-01 PROCEDURE — 25000132 ZZH RX MED GY IP 250 OP 250 PS 637: Mod: GY | Performed by: NURSE PRACTITIONER

## 2018-08-01 RX ORDER — LISINOPRIL 2.5 MG/1
5 TABLET ORAL DAILY
Status: DISCONTINUED | OUTPATIENT
Start: 2018-08-01 | End: 2018-08-04

## 2018-08-01 RX ORDER — TACROLIMUS 1 MG/1
2 CAPSULE ORAL AT BEDTIME
Status: DISCONTINUED | OUTPATIENT
Start: 2018-08-01 | End: 2018-08-04

## 2018-08-01 RX ORDER — TACROLIMUS 1 MG/1
1 CAPSULE ORAL
Status: DISCONTINUED | OUTPATIENT
Start: 2018-08-02 | End: 2018-08-02

## 2018-08-01 RX ADMIN — LISINOPRIL 5 MG: 2.5 TABLET ORAL at 16:56

## 2018-08-01 RX ADMIN — ISOSORBIDE DINITRATE 20 MG: 10 TABLET ORAL at 08:29

## 2018-08-01 RX ADMIN — ACETAMINOPHEN 650 MG: 325 TABLET, FILM COATED ORAL at 10:02

## 2018-08-01 RX ADMIN — TACROLIMUS 2 MG: 1 CAPSULE ORAL at 21:50

## 2018-08-01 RX ADMIN — NYSTATIN 1000000 UNITS: 100000 SUSPENSION ORAL at 20:49

## 2018-08-01 RX ADMIN — PREDNISONE 15 MG: 5 TABLET ORAL at 20:48

## 2018-08-01 RX ADMIN — NYSTATIN 1000000 UNITS: 100000 SUSPENSION ORAL at 12:33

## 2018-08-01 RX ADMIN — LIDOCAINE 1 PATCH: 560 PATCH PERCUTANEOUS; TOPICAL; TRANSDERMAL at 20:43

## 2018-08-01 RX ADMIN — ACETAMINOPHEN 650 MG: 325 TABLET, FILM COATED ORAL at 05:19

## 2018-08-01 RX ADMIN — MYCOPHENOLATE MOFETIL 250 MG: 250 CAPSULE ORAL at 18:51

## 2018-08-01 RX ADMIN — PREDNISONE 20 MG: 20 TABLET ORAL at 08:29

## 2018-08-01 RX ADMIN — HYDRALAZINE HYDROCHLORIDE 50 MG: 50 TABLET ORAL at 20:48

## 2018-08-01 RX ADMIN — HYDRALAZINE HYDROCHLORIDE 50 MG: 50 TABLET ORAL at 08:29

## 2018-08-01 RX ADMIN — NYSTATIN 1000000 UNITS: 100000 SUSPENSION ORAL at 08:29

## 2018-08-01 RX ADMIN — CEFEPIME HYDROCHLORIDE 1 G: 1 INJECTION, POWDER, FOR SOLUTION INTRAMUSCULAR; INTRAVENOUS at 16:56

## 2018-08-01 RX ADMIN — INSULIN HUMAN 33 UNITS: 100 INJECTION, SUSPENSION SUBCUTANEOUS at 08:38

## 2018-08-01 RX ADMIN — Medication 5 MG: at 08:29

## 2018-08-01 RX ADMIN — NYSTATIN 1000000 UNITS: 100000 SUSPENSION ORAL at 16:56

## 2018-08-01 RX ADMIN — INSULIN ASPART 2 UNITS: 100 INJECTION, SOLUTION INTRAVENOUS; SUBCUTANEOUS at 12:36

## 2018-08-01 RX ADMIN — INSULIN HUMAN 18 UNITS: 100 INJECTION, SUSPENSION SUBCUTANEOUS at 20:42

## 2018-08-01 RX ADMIN — APIXABAN 2.5 MG: 2.5 TABLET, FILM COATED ORAL at 08:29

## 2018-08-01 RX ADMIN — MYCOPHENOLATE MOFETIL 250 MG: 250 CAPSULE ORAL at 08:29

## 2018-08-01 RX ADMIN — PANTOPRAZOLE SODIUM 40 MG: 40 TABLET, DELAYED RELEASE ORAL at 08:29

## 2018-08-01 RX ADMIN — TACROLIMUS 1 MG: 1 CAPSULE ORAL at 08:29

## 2018-08-01 RX ADMIN — Medication 50 MG: at 21:50

## 2018-08-01 RX ADMIN — INSULIN ASPART 6 UNITS: 100 INJECTION, SOLUTION INTRAVENOUS; SUBCUTANEOUS at 18:46

## 2018-08-01 RX ADMIN — ROSUVASTATIN CALCIUM 20 MG: 10 TABLET, FILM COATED ORAL at 08:29

## 2018-08-01 RX ADMIN — ISOSORBIDE DINITRATE 20 MG: 10 TABLET ORAL at 12:33

## 2018-08-01 RX ADMIN — ASPIRIN 81 MG: 81 TABLET, COATED ORAL at 20:48

## 2018-08-01 RX ADMIN — FLUTICASONE PROPIONATE 2 SPRAY: 50 SPRAY, METERED NASAL at 08:30

## 2018-08-01 RX ADMIN — Medication 50 MG: at 10:02

## 2018-08-01 RX ADMIN — HYDRALAZINE HYDROCHLORIDE 50 MG: 50 TABLET ORAL at 14:39

## 2018-08-01 ASSESSMENT — ACTIVITIES OF DAILY LIVING (ADL)
ADLS_ACUITY_SCORE: 11

## 2018-08-01 ASSESSMENT — PAIN DESCRIPTION - DESCRIPTORS
DESCRIPTORS: HEADACHE
DESCRIPTORS: HEADACHE

## 2018-08-01 NOTE — PLAN OF CARE
Problem: Patient Care Overview  Goal: Plan of Care/Patient Progress Review  Outcome: No Change  Neuro: A&Ox4.   Cardiac: SR 80-90's. BP remains elevated.  Isosorbide increased from 10 mg TID to 20 mg TID.  Respiratory: Sating >92% on RA. Tolerating activity  GI/: Oliguric. No BM  Diet/appetite: Tolerating carb controlled diet. Eating well. Carb coverage - 1 uint/10 CHO.    Activity:  Independent, up to chair and in halls.  Pain: At acceptable level on current regimen.  Tramadol q12 and tylenol q4.  Skin: Large necrotic mouth ulcer on hard palate.  Area white with slough  LDA's: R PIV. Double lumen Subclavian catheter for HD.      Plan: Continue with POC. Notify primary team with changes.  Tac and mycophenolate restarted.  Possible heart biopsy tomorrow if ANC/WBC jump up to acceptable level per team.  NPO at midnight.

## 2018-08-01 NOTE — PLAN OF CARE
Problem: Infection, Risk/Actual (Adult)  Goal: Identify Related Risk Factors and Signs and Symptoms  Related risk factors and signs and symptoms are identified upon initiation of Human Response Clinical Practice Guideline (CPG).   Outcome: No Change  A/Ox4. SR HR 90's. VSS on RA. . Tolerating Mod carb diet. Headache improving. Pt continues to use hot packs with some relief. PIV SL. CVC  WDL.   Plan dialysis at 0800 prior to RHC and biopsy. NPO at midnight     08/01/18 1600   Infection, Risk/Actual   Related Risk Factors (Infection, Risk/Actual) immunosuppressed   Signs and Symptoms (Infection, Risk/Actual) lab value changes;cultures positive;blood glucose changes;heart rate increase

## 2018-08-01 NOTE — PLAN OF CARE
"Problem: Patient Care Overview  Goal: Plan of Care/Patient Progress Review  Outcome: Improving  Care Provided: 9795-4171    Neuro: A&Ox4.   Cardiac: SR. BPs elevated at start of shift, but well controlled by AM- /85 (BP Location: Left arm)  Pulse 85  Temp 98.3  F (36.8  C) (Oral)  Resp 16  Ht 1.753 m (5' 9\")  Wt 78.3 kg (172 lb 9.9 oz)  SpO2 98%  BMI 25.49 kg/m2 this AM.   Respiratory: Sating >95% on RA. Lungs clear throughout. Denies SOB & MEADE.  GI/: Oliguric on HD - minimal UOP on shift. No BM on shift.  Diet/appetite: Tolerating consistent carb diet. NPO since midnight for possible heart biopsy today.  Activity:  Up independent in room.  Pain: Pt reports persistent headache that has been present since PTA. PRN tylenol given when available Q4h. PRN tramadol given before bed & lidocaine patch placed on upper back. Pt reports it is tolerable, but \"just constantly there\". Pt slept well throughout the night - was not awoken for 0000 vitals d/t sleep (pt calls appropriately when awake in room so RN aware); pt \"greatly appreciated\" the rest.   Skin: Oral lesion on roof of mouth - slough. Scars/bruises throughout. No new deficits noted on shift.   IV: PIV x1 - SL. Ti HD cath RUQ.     R: Will continue to monitor pt closely and notify primary team with any changes.      Problem: Infection, Risk/Actual (Adult)  Goal: Identify Related Risk Factors and Signs and Symptoms  Related risk factors and signs and symptoms are identified upon initiation of Human Response Clinical Practice Guideline (CPG).   Outcome: Improving   08/01/18 0630   Infection, Risk/Actual   Related Risk Factors (Infection, Risk/Actual) immunosuppressed;medication effects;treatment plan   Signs and Symptoms (Infection, Risk/Actual) blood glucose changes;heart rate increase;cultures positive;pain;lab value changes       Problem: Diabetes Comorbidity  Goal: Diabetes  Patient comorbidity will be monitored for signs and symptoms of " hyperglycemia or hypoglycemia. Problems will be absent, minimized or managed by discharge/transition of care.   Outcome: No Change  BG monitored ACHS - sliding scale & carb coverage Novalog. NPO since midnight.     Recent Labs  Lab 08/01/18  0537 07/31/18  2158 07/31/18  1839 07/31/18  1404 07/31/18  0747 07/31/18  0446 07/30/18  2138 07/30/18  1854  07/30/18  0605  07/29/18  0523  07/28/18  0716  07/27/18  0624   GLC 74  --   --   --   --  85  --   --   --  171*  --  195*  --  309*  --  202*   BGM  --  143* 251* 179* 92  --  330* 255*  < >  --   < >  --   < >  --   < >  --    < > = values in this interval not displayed.      Problem: Renal Insufficiency Comorbidity  Goal: Renal Insufficiency  Patient comorbidity will be monitored for signs and symptoms of Renal Insufficiency (Chronic) condition.  Problems will be absent, minimized or managed by discharge/transition of care.   Pt with ESRD - listed for kidney tx on 7/10/17. Pt on HD T/Th/Sa. Monitoring closely.

## 2018-08-01 NOTE — PLAN OF CARE
Problem: Infection, Risk/Actual (Adult)  Goal: Identify Related Risk Factors and Signs and Symptoms  Related risk factors and signs and symptoms are identified upon initiation of Human Response Clinical Practice Guideline (CPG).   Outcome: No Change  No acute changes on day shift. Patient stable. Persistent headaches frontal and orbital- heat packs in place helping somewhat. No acute focal deficits, neurologically intact. CV- SR, no ectopy per EKG, normotensive to hypertensive- hydralazine increased, isordil scheduled. +2 pulses, afebrile. satting great on room air. Tolerating a consistent cho/diabetic diet, oliguric- on HD. No HD today. soft stools, independently ambulating to bathroom. No skin concerns. Left PIV sl'd. No acute concerns. Will be NPO at MN for RHC and biopsy tomorrow am.

## 2018-08-01 NOTE — PROGRESS NOTES
[facility]   Cardiology II Service / Advanced Heart Failure  Daily Progress Note      Patient: Murray Nicholson  MRN: [id number]  Admission Date: 7/26/2018  Hospital Day # 6    Assessment and Plan: Murray Nicholson is a 63 year old male who is s/p orthotopic heart transplant on 6/14/18. Post-op course was complicated leukocytosis for which he received abx, RIJ thrombus infected with CoNS, an incidental pneumoperitoneum. He was admitted with neutropenic fevers and found to have one bottle with GNR and a large mouth ulcer growing Pseudomonas.    Today's Plan:  - Monitor BP, may change isordil to lisinopril to see if this helps HA  - Continue cefepime    # Pseudomonal bacteremia, resolving  # Large necrotic mouth ulcer  # Mild prececal colitis  2/2 bottles at [facility] positive for Pseudmonas, will continue cefepime per ID.  Patient presented with neutropenic fevers, now clinically improving on abx.  Mouth ulcer unchanged on daily exams.  - Continue cefepime for 14 day course (7/27-7/30)     # Leukopenia  Improving in setting of decreasing tacro level and lower MMF. While this is only correlation would suspect that recent supra-therapeutic tacro level contributed to leukopenia. Chronic anemia likely secondary to ESRD, not having thrombocytopenia. Parvo virus negative.      # Status post OHT on 6/14/18, history of NICM  # Donor 3 vessel CAD  # Chronic immunosuppression  - Rejection history: none  - Restarted Cellcept 250 mg bid on 7/31  - Restarted tacrolimus 1 mg BID on 7/31, increase to 1/2 mg daily (goal level was 10-12, will target 8 for now)  - Prednisone 20 mg / 15 mg  - Bx in AM, will do RHC as well    Prophylaxis:  - PCP: off Bactrim due to leukopenia, pentamidine 7/20/18, due 8/17/18  - Thrush: Nystatin   - PPI: pantoprazole 40 mg  - CAV: ASA 81 mg, Crestor 20 mg daily  - CMV (D+ / R-): holding Valcyte d/t leukopenia       Serostatus: CMV: D+/R-. EBV: D+/R pending      # HA  Chronic, possible due to isordil  "+/- tacrolimus corey in setting of high levels. Negative head CT on admission and no neuro sx.   - may change isordil to ACEi  - monitor    # HTN  Mostly at goal, isolated BPs 160/100.   - isordil 20 mg TID, monitor HAs, hydralazine 50 mg TID  - per nephrology, ok to use ACEi     # RIJ Thrombus with CoNS  - continue Eliquis, hold tonight and AM before bx       # ESRD   - HD TTS, listed for renal transplant, EDW 76 kg    FEN: consistent carb  PROPHY:  Eliquis, PPI  LINES:  Right subclavian CVC, PIV  DISPO:  Pending, likely home early next week  CODE STATUS:  Full   ================================================================    Subjective/24-Hr Events:   Last 24 hr care team notes reviewed. No overnight events. Patient denies fluid retention, shortness of breath, chest pain, feeling feverish. Endorses HA present since prior to admission, not necessarily worse nor better since admission, no acute vision changes     ROS:  4 point ROS including respiratory, CV, GI and  (other than that noted in the HPI) is negative.     Medications: Reviewed in EPIC.     Physical Exam:   /85 (BP Location: Left arm)  Pulse 85  Temp 98.3  F (36.8  C) (Oral)  Resp 16  Ht 1.753 m (5' 9\")  Wt 78.3 kg (172 lb 9.9 oz)  SpO2 98%  BMI 25.49 kg/m2    GENERAL: Appears comfortable, in no distress.  MOUTH: white slough/ulcer on hard palate, necrotic appearing base  HEENT: Eye symmetrical, no discharge or icterus bilaterally. Mucous membranes moist and without lesions.  NECK: Supple, JVD not visible at 90 degrees.   CV: RRR, +S1S2, no murmur, rub, or gallop.   RESPIRATORY: Respirations regular, even, and unlabored. Lungs CTA throughout.   GI: Soft and non distended with normoactive bowel sounds present in all quadrants. No tenderness, rebound, guarding.   EXTREMITIES: No peripheral edema. 2+ bilateral pedal pulses.   NEUROLOGIC: Alert and oriented x 3. No focal deficits.   MUSCULOSKELETAL: No joint swelling or tenderness.   SKIN: No " jaundice. No rashes or lesions.     Labs:  CMP    Recent Labs  Lab 08/01/18  0537 07/31/18  0446 07/30/18  0605 07/29/18  0523  07/27/18  0624 07/26/18  1732   * 127* 130* 130*  < > 129* 129*   POTASSIUM 4.2 4.9 4.7 5.0  < > 4.2 4.4   CHLORIDE 93* 93* 94 93*  < > 96 94   CO2 30 24 25 29  < > 21 26   ANIONGAP 8 10 11 8  < > 12 9   GLC 74 85 171* 195*  < > 202* 158*   BUN 38* 78* 61* 40*  < > 36* 22   CR 4.53* 7.07* 5.95* 4.11*  < > 4.19* 2.96*   GFRESTIMATED 13* 8* 10* 15*  < > 14* 22*   GFRESTBLACK 16* 10* 12* 18*  < > 17* 26*   TRINI 8.6 8.4* 8.0* 8.1*  < > 7.8* 8.4*   MAG 1.6 1.8 1.9 1.6  < > 1.4*  --    PHOS  --  2.6  --   --   --  3.0  --    PROTTOTAL  --   --   --  5.2*  --   --  6.1*   ALBUMIN  --   --   --  1.9*  --   --  2.3*   BILITOTAL  --   --   --  0.4  --   --  0.7   ALKPHOS  --   --   --  106  --   --  95   AST  --   --   --  26  --   --  18   ALT  --   --   --  21  --   --  19   < > = values in this interval not displayed.    CBC    Recent Labs  Lab 08/01/18  0537 07/31/18  0446 07/30/18  0605 07/29/18  0523   WBC 3.4* 2.1* 1.3* 0.6*   RBC 3.01* 2.74* 2.56* 2.57*   HGB 8.7* 7.9* 7.5* 7.5*   HCT 26.7* 24.0* 22.4* 22.4*   MCV 89 88 88 87   MCH 28.9 28.8 29.3 29.2   MCHC 32.6 32.9 33.5 33.5   RDW 19.4* 19.0* 19.1* 19.0*    304 258 253       INR    Recent Labs  Lab 07/26/18  1732   INR 1.24*       Time/Communication  I personally spent a total of 25 minutes. Of that 15 minutes was counseling/coordination of patient's care. Plan of care discussed with patient. See my note above for details.    Patient discussed with Dr. Muse.      Ramya Suh, FRANK, NP-C  Advanced Heart Failure/Cardiology II Service  Pager [phone]

## 2018-08-01 NOTE — PROGRESS NOTES
Nephrology Progress Note  08/01/2018       ASSESSMENT AND RECOMMENDATIONS:   Murray Nicholson is a 63 yr old male with PMH of HTN, DMII, CM s/p heart transplant (6/14/2018), ESRD, admitted with neutropenic fever and mouth ulcer concerning for HSV vs other virus, also GNR cultured from CVC 7/26. Found to have Pseudomonas aruginosa per mouth ulcer and CVC.      ESKD: due to DMII, dialyzes TTS schedule at [facility] under care of Dr Mcpherson. Access: tunneled RIJ (replaced 4/17/18). Run time: 4 hrs; EDW 76 kg. Is listed for transplant.  - Dialysis per TTS schedule   - Use low dose heparin on HD  - Heparin lock CVC       Volume: EDW 76 kg. Appears mildly hypervolemic, ~ 2.5 kg over EDW. Oxygen 100% on RA  - Daily standing weights    BP: elevated, meds being titrated; currently on hydralazine 50 mg tid, isosorbide dinitrate 20 mg bid     Anemia: hgb 8.7, s/p 1 unit PRBCs, recent labs with ferritin 876, IS 33, iron 74, hgb 7's; on epogen 7600 units per HD (recently increased).  WBC is 0.9 and Cellcept was stopped on admission .  Plts normal.  Hapto wnl, retic % 1.5, not appropriately elevated, parvo pending  - will increase epogen to 10,000 units per HD  - transfuse per primary team      BMD: calcium 8.6, alb 1.9, phos 2.6; recent ; currently not on Vit D analogue or phos binder      S/p OHT: on 6/14/18, on tacrolimus and pred with transplant team and transplant ID adjusting doses in setting of elevated tacrolimus level and leukopenia/neutropenia and concern for infection. Tacro and MMF restarted, valcyte on hold.   - Having heart bx tomorrow     Pseudomonas aruginosa sepsis: cultured from mouth ulcer and CVC; neutropenic; blood cx drawn at outpt dialysis unit on 7/26 two out of two with Pseudomonas auruginosa; mouth ulcer neg for HCV, positive for Pseudomonas aruginosa; CMV, EBV, varicella negative; transplant ID following. On cefepime  -  Mouth ulcer positive for Pseudomonas aruginosa; on cefepime  - CVC cx  "drawn at outpt dialysis unit on 7/26, two out of two with Pseudomonas aruginosa (sensitivities faxed and in the chart; pan-sensitive, LILIAN cefepime 4, levofloxacin 1, cipro 0.25)  -  Per ID, ok to treat through line infection, cefepime can be dosed for HD    Recommendations were communicated to primary team via this note.      MAYTE Garcia  Division of Kidney Disease  Pager [phone]    Interval History :   Seen bedside, having heart bx tomorrow, on cefepime; continues to endorse headache that is not relieved with current pain regimen; denies CP, SOB, n/v, chills     Review of Systems:   4 point ROS negative other than as noted above.    Physical Exam:   I/O last 3 completed shifts:  In: 1200 [P.O.:1200]  Out: 4000 [Other:4000]   BP (!) 166/109 (BP Location: Left arm)  Pulse 85  Temp 98.2  F (36.8  C) (Oral)  Resp 16  Ht 1.753 m (5' 9\")  Wt 78.3 kg (172 lb 9.9 oz)  SpO2 100%  BMI 25.49 kg/m2     GENERAL APPEARANCE: alert, NAD  EYES:  no scleral icterus, pupils equal  HENT: mouth without ulcers or lesions  PULM: lungs clear to auscultation  CV: regular rhythm, normal rate      -edema trace   GI: soft, nontender  INTEGUMENT: no cyanosis, no rash  Access tunneled RIJ    Labs:   All labs reviewed by me  Electrolytes/Renal -   Recent Labs   Lab Test  08/01/18   0537  07/31/18   0446  07/30/18   0605   07/27/18   0624   07/23/18   0914   NA  131*  127*  130*   < >  129*   < >  130*   POTASSIUM  4.2  4.9  4.7   < >  4.2   < >  5.3   CHLORIDE  93*  93*  94   < >  96   < >  99   CO2  30  24  25   < >  21   < >  18*   BUN  38*  78*  61*   < >  36*   < >  68*   CR  4.53*  7.07*  5.95*   < >  4.19*   < >  7.09*   GLC  74  85  171*   < >  202*   < >  285*   TRINI  8.6  8.4*  8.0*   < >  7.8*   < >  8.6   MAG  1.6  1.8  1.9   < >  1.4*   --   1.7   PHOS   --   2.6   --    --   3.0   --   2.5    < > = values in this interval not displayed.       CBC -   Recent Labs   Lab Test  08/01/18   0537  07/31/18   0446  " 07/30/18   0605   WBC  3.4*  2.1*  1.3*   HGB  8.7*  7.9*  7.5*   PLT  326  304  258       LFTs -   Recent Labs   Lab Test  07/29/18   0523  07/26/18   1732  07/18/18   0841   ALKPHOS  106  95  140   BILITOTAL  0.4  0.7  0.5   ALT  21  19  24   AST  26  18  14   PROTTOTAL  5.2*  6.1*  6.3*   ALBUMIN  1.9*  2.3*  2.9*       Iron Panel -   Recent Labs   Lab Test  07/10/18   0711  05/08/18   0954  07/19/17   1306   IRON  66  58  46   IRONSAT  28  27  18   ALBERTINA  771*  621*  369         Imaging:  Normal chest CT and head CT 7/26    Current Medications:    [START ON 8/2/2018] apixaban ANTICOAGULANT  2.5 mg Oral BID     aspirin  81 mg Oral Daily     biotin  5 mg Oral Daily     ceFEPIme (MAXIPIME) IV  1 g Intravenous Q24H     fluticasone  1-2 spray Both Nostrils Daily     hydrALAZINE  50 mg Oral TID     insulin aspart  1-10 Units Subcutaneous TID AC     insulin aspart  1-7 Units Subcutaneous At Bedtime     insulin aspart   Subcutaneous QAM AC     insulin aspart   Subcutaneous Daily with lunch     insulin aspart   Subcutaneous Daily with supper     insulin isophane human  18 Units Subcutaneous Daily with supper     insulin isophane human  33 Units Subcutaneous QAM AC     isosorbide dinitrate  20 mg Oral TID AC     lidocaine  1 patch Transdermal Q24h    And     lidocaine   Transdermal Q24H    And     lidocaine   Transdermal Q8H     mycophenolate  250 mg Oral BID IS     nystatin  1,000,000 Units Swish & Swallow 4x Daily     pantoprazole  40 mg Oral QAM     predniSONE  15 mg Oral QPM     predniSONE  20 mg Oral QAM     rosuvastatin  20 mg Oral Daily     sodium chloride (PF)  3 mL Intracatheter Q8H     tacrolimus  1 mg Oral BID IS       - MEDICATION INSTRUCTIONS -       Kristi Armas PA-C

## 2018-08-02 ENCOUNTER — APPOINTMENT (OUTPATIENT)
Dept: CARDIOLOGY | Facility: CLINIC | Age: 63
DRG: 871 | End: 2018-08-02
Attending: NURSE PRACTITIONER
Payer: MEDICARE

## 2018-08-02 LAB
ANION GAP SERPL CALCULATED.3IONS-SCNC: 9 MMOL/L (ref 3–14)
ANISOCYTOSIS BLD QL SMEAR: ABNORMAL
BASOPHILS # BLD AUTO: 0 10E9/L (ref 0–0.2)
BASOPHILS NFR BLD AUTO: 0 %
BUN SERPL-MCNC: 53 MG/DL (ref 7–30)
BURR CELLS BLD QL SMEAR: SLIGHT
CALCIUM SERPL-MCNC: 8.4 MG/DL (ref 8.5–10.1)
CHLORIDE SERPL-SCNC: 93 MMOL/L (ref 94–109)
CO2 SERPL-SCNC: 28 MMOL/L (ref 20–32)
CREAT SERPL-MCNC: 6.29 MG/DL (ref 0.66–1.25)
DIFFERENTIAL METHOD BLD: ABNORMAL
EOSINOPHIL # BLD AUTO: 0 10E9/L (ref 0–0.7)
EOSINOPHIL NFR BLD AUTO: 0 %
ERYTHROCYTE [DISTWIDTH] IN BLOOD BY AUTOMATED COUNT: 19.8 % (ref 10–15)
GFR SERPL CREATININE-BSD FRML MDRD: 9 ML/MIN/1.7M2
GLUCOSE BLDC GLUCOMTR-MCNC: 138 MG/DL (ref 70–99)
GLUCOSE BLDC GLUCOMTR-MCNC: 204 MG/DL (ref 70–99)
GLUCOSE BLDC GLUCOMTR-MCNC: 278 MG/DL (ref 70–99)
GLUCOSE BLDC GLUCOMTR-MCNC: 64 MG/DL (ref 70–99)
GLUCOSE BLDC GLUCOMTR-MCNC: 82 MG/DL (ref 70–99)
GLUCOSE SERPL-MCNC: 62 MG/DL (ref 70–99)
HCT VFR BLD AUTO: 24 % (ref 40–53)
HGB BLD-MCNC: 7.9 G/DL (ref 13.3–17.7)
LYMPHOCYTES # BLD AUTO: 0.2 10E9/L (ref 0.8–5.3)
LYMPHOCYTES NFR BLD AUTO: 3.8 %
MACROCYTES BLD QL SMEAR: PRESENT
MAGNESIUM SERPL-MCNC: 1.8 MG/DL (ref 1.6–2.3)
MCH RBC QN AUTO: 29.6 PG (ref 26.5–33)
MCHC RBC AUTO-ENTMCNC: 32.9 G/DL (ref 31.5–36.5)
MCV RBC AUTO: 90 FL (ref 78–100)
MONOCYTES # BLD AUTO: 0.6 10E9/L (ref 0–1.3)
MONOCYTES NFR BLD AUTO: 13.2 %
NEUTROPHILS # BLD AUTO: 3.7 10E9/L (ref 1.6–8.3)
NEUTROPHILS NFR BLD AUTO: 83 %
PLATELET # BLD AUTO: 272 10E9/L (ref 150–450)
PLATELET # BLD EST: ABNORMAL 10*3/UL
POIKILOCYTOSIS BLD QL SMEAR: SLIGHT
POLYCHROMASIA BLD QL SMEAR: SLIGHT
POTASSIUM SERPL-SCNC: 4.4 MMOL/L (ref 3.4–5.3)
RBC # BLD AUTO: 2.67 10E12/L (ref 4.4–5.9)
RBC INCLUSIONS BLD: SLIGHT
SODIUM SERPL-SCNC: 130 MMOL/L (ref 133–144)
TACROLIMUS BLD-MCNC: 5.1 UG/L (ref 5–15)
TACROLIMUS BLD-MCNC: 6.1 UG/L (ref 5–15)
TME LAST DOSE: NORMAL H
TME LAST DOSE: NORMAL H
WBC # BLD AUTO: 4.5 10E9/L (ref 4–11)

## 2018-08-02 PROCEDURE — 00000146 ZZHCL STATISTIC GLUCOSE BY METER IP

## 2018-08-02 PROCEDURE — 63400005 ZZH RX 634: Performed by: INTERNAL MEDICINE

## 2018-08-02 PROCEDURE — 88307 TISSUE EXAM BY PATHOLOGIST: CPT | Performed by: INTERNAL MEDICINE

## 2018-08-02 PROCEDURE — 80197 ASSAY OF TACROLIMUS: CPT | Performed by: INTERNAL MEDICINE

## 2018-08-02 PROCEDURE — A9270 NON-COVERED ITEM OR SERVICE: HCPCS | Mod: GY | Performed by: NURSE PRACTITIONER

## 2018-08-02 PROCEDURE — 25000131 ZZH RX MED GY IP 250 OP 636 PS 637: Mod: GY | Performed by: STUDENT IN AN ORGANIZED HEALTH CARE EDUCATION/TRAINING PROGRAM

## 2018-08-02 PROCEDURE — 88346 IMFLUOR 1ST 1ANTB STAIN PX: CPT | Performed by: INTERNAL MEDICINE

## 2018-08-02 PROCEDURE — 85025 COMPLETE CBC W/AUTO DIFF WBC: CPT | Performed by: INTERNAL MEDICINE

## 2018-08-02 PROCEDURE — 25000132 ZZH RX MED GY IP 250 OP 250 PS 637: Mod: GY | Performed by: INTERNAL MEDICINE

## 2018-08-02 PROCEDURE — 25000131 ZZH RX MED GY IP 250 OP 636 PS 637: Mod: GY | Performed by: HOSPITALIST

## 2018-08-02 PROCEDURE — 93505 ENDOMYOCARDIAL BIOPSY: CPT | Mod: 26 | Performed by: INTERNAL MEDICINE

## 2018-08-02 PROCEDURE — 83735 ASSAY OF MAGNESIUM: CPT | Performed by: INTERNAL MEDICINE

## 2018-08-02 PROCEDURE — 88350 IMFLUOR EA ADDL 1ANTB STN PX: CPT | Performed by: INTERNAL MEDICINE

## 2018-08-02 PROCEDURE — 36415 COLL VENOUS BLD VENIPUNCTURE: CPT | Performed by: INTERNAL MEDICINE

## 2018-08-02 PROCEDURE — 80197 ASSAY OF TACROLIMUS: CPT

## 2018-08-02 PROCEDURE — 25000128 H RX IP 250 OP 636: Performed by: INTERNAL MEDICINE

## 2018-08-02 PROCEDURE — A9270 NON-COVERED ITEM OR SERVICE: HCPCS | Mod: GY | Performed by: STUDENT IN AN ORGANIZED HEALTH CARE EDUCATION/TRAINING PROGRAM

## 2018-08-02 PROCEDURE — 25000128 H RX IP 250 OP 636: Performed by: STUDENT IN AN ORGANIZED HEALTH CARE EDUCATION/TRAINING PROGRAM

## 2018-08-02 PROCEDURE — A9270 NON-COVERED ITEM OR SERVICE: HCPCS | Mod: GY | Performed by: INTERNAL MEDICINE

## 2018-08-02 PROCEDURE — 99233 SBSQ HOSP IP/OBS HIGH 50: CPT | Mod: GC | Performed by: INTERNAL MEDICINE

## 2018-08-02 PROCEDURE — 4A023N6 MEASUREMENT OF CARDIAC SAMPLING AND PRESSURE, RIGHT HEART, PERCUTANEOUS APPROACH: ICD-10-PCS | Performed by: INTERNAL MEDICINE

## 2018-08-02 PROCEDURE — 90937 HEMODIALYSIS REPEATED EVAL: CPT

## 2018-08-02 PROCEDURE — 25000132 ZZH RX MED GY IP 250 OP 250 PS 637: Mod: GY | Performed by: NURSE PRACTITIONER

## 2018-08-02 PROCEDURE — 02BM3ZX EXCISION OF VENTRICULAR SEPTUM, PERCUTANEOUS APPROACH, DIAGNOSTIC: ICD-10-PCS | Performed by: INTERNAL MEDICINE

## 2018-08-02 PROCEDURE — 80048 BASIC METABOLIC PNL TOTAL CA: CPT | Performed by: INTERNAL MEDICINE

## 2018-08-02 PROCEDURE — 25000125 ZZHC RX 250: Performed by: STUDENT IN AN ORGANIZED HEALTH CARE EDUCATION/TRAINING PROGRAM

## 2018-08-02 PROCEDURE — 12000001 ZZH R&B MED SURG/OB UMMC

## 2018-08-02 PROCEDURE — 25000132 ZZH RX MED GY IP 250 OP 250 PS 637: Mod: GY | Performed by: STUDENT IN AN ORGANIZED HEALTH CARE EDUCATION/TRAINING PROGRAM

## 2018-08-02 RX ORDER — TACROLIMUS 1 MG/1
2 CAPSULE ORAL
Status: DISCONTINUED | OUTPATIENT
Start: 2018-08-03 | End: 2018-08-04

## 2018-08-02 RX ORDER — HEPARIN SODIUM 1000 [USP'U]/ML
500 INJECTION, SOLUTION INTRAVENOUS; SUBCUTANEOUS
Status: DISCONTINUED | OUTPATIENT
Start: 2018-08-02 | End: 2018-08-02

## 2018-08-02 RX ORDER — MYCOPHENOLATE MOFETIL 250 MG/1
500 CAPSULE ORAL
Status: DISCONTINUED | OUTPATIENT
Start: 2018-08-02 | End: 2018-08-06 | Stop reason: HOSPADM

## 2018-08-02 RX ORDER — LIDOCAINE 40 MG/G
CREAM TOPICAL
Status: DISCONTINUED | OUTPATIENT
Start: 2018-08-02 | End: 2018-08-06 | Stop reason: HOSPADM

## 2018-08-02 RX ORDER — HEPARIN SODIUM 1000 [USP'U]/ML
500 INJECTION, SOLUTION INTRAVENOUS; SUBCUTANEOUS CONTINUOUS
Status: DISCONTINUED | OUTPATIENT
Start: 2018-08-02 | End: 2018-08-02

## 2018-08-02 RX ADMIN — APIXABAN 2.5 MG: 2.5 TABLET, FILM COATED ORAL at 20:22

## 2018-08-02 RX ADMIN — INSULIN ASPART 6 UNITS: 100 INJECTION, SOLUTION INTRAVENOUS; SUBCUTANEOUS at 19:38

## 2018-08-02 RX ADMIN — ASPIRIN 81 MG: 81 TABLET, COATED ORAL at 20:22

## 2018-08-02 RX ADMIN — HYDRALAZINE HYDROCHLORIDE 50 MG: 50 TABLET ORAL at 15:00

## 2018-08-02 RX ADMIN — NYSTATIN 1000000 UNITS: 100000 SUSPENSION ORAL at 15:00

## 2018-08-02 RX ADMIN — PANTOPRAZOLE SODIUM 40 MG: 40 TABLET, DELAYED RELEASE ORAL at 15:00

## 2018-08-02 RX ADMIN — MYCOPHENOLATE MOFETIL 500 MG: 250 CAPSULE ORAL at 18:26

## 2018-08-02 RX ADMIN — MYCOPHENOLATE MOFETIL 250 MG: 250 CAPSULE ORAL at 07:29

## 2018-08-02 RX ADMIN — LIDOCAINE 1 PATCH: 560 PATCH PERCUTANEOUS; TOPICAL; TRANSDERMAL at 20:22

## 2018-08-02 RX ADMIN — SODIUM CHLORIDE 250 ML: 9 INJECTION, SOLUTION INTRAVENOUS at 08:21

## 2018-08-02 RX ADMIN — ACETAMINOPHEN 650 MG: 325 TABLET, FILM COATED ORAL at 20:22

## 2018-08-02 RX ADMIN — TACROLIMUS 1 MG: 1 CAPSULE ORAL at 10:29

## 2018-08-02 RX ADMIN — Medication 5 MG: at 15:00

## 2018-08-02 RX ADMIN — EPOETIN ALFA 10000 UNITS: 10000 SOLUTION INTRAVENOUS; SUBCUTANEOUS at 11:42

## 2018-08-02 RX ADMIN — HYDRALAZINE HYDROCHLORIDE 50 MG: 50 TABLET ORAL at 20:22

## 2018-08-02 RX ADMIN — TACROLIMUS 2 MG: 1 CAPSULE ORAL at 18:26

## 2018-08-02 RX ADMIN — SODIUM CHLORIDE 300 ML: 9 INJECTION, SOLUTION INTRAVENOUS at 08:21

## 2018-08-02 RX ADMIN — CEFEPIME HYDROCHLORIDE 1 G: 1 INJECTION, POWDER, FOR SOLUTION INTRAMUSCULAR; INTRAVENOUS at 16:59

## 2018-08-02 RX ADMIN — LISINOPRIL 5 MG: 2.5 TABLET ORAL at 15:00

## 2018-08-02 RX ADMIN — ROSUVASTATIN CALCIUM 20 MG: 10 TABLET, FILM COATED ORAL at 15:00

## 2018-08-02 RX ADMIN — FLUTICASONE PROPIONATE 2 SPRAY: 50 SPRAY, METERED NASAL at 07:34

## 2018-08-02 RX ADMIN — Medication 50 MG: at 22:21

## 2018-08-02 RX ADMIN — PREDNISONE 15 MG: 5 TABLET ORAL at 20:22

## 2018-08-02 RX ADMIN — NYSTATIN 1000000 UNITS: 100000 SUSPENSION ORAL at 07:56

## 2018-08-02 RX ADMIN — NYSTATIN 1000000 UNITS: 100000 SUSPENSION ORAL at 20:22

## 2018-08-02 RX ADMIN — PREDNISONE 20 MG: 20 TABLET ORAL at 07:29

## 2018-08-02 ASSESSMENT — ACTIVITIES OF DAILY LIVING (ADL)
ADLS_ACUITY_SCORE: 11

## 2018-08-02 ASSESSMENT — PAIN DESCRIPTION - DESCRIPTORS
DESCRIPTORS: HEADACHE

## 2018-08-02 NOTE — PROGRESS NOTES
"CLINICAL NUTRITION SERVICES - ASSESSMENT NOTE     Nutrition Prescription    RECOMMENDATIONS FOR MDs/PROVIDERS TO ORDER:  Continue to encourage PO intakes    Malnutrition Status:    Unable to assess    Recommendations already ordered by Registered Dietitian (RD):  RD to order nephronex    Future/Additional Recommendations:  Continue to monitor wt and PO intakes     REASON FOR ASSESSMENT  Murray Nicholson is a/an 63 year old male assessed by the dietitian for LOS    NUTRITION HISTORY  HD: 2.7 kg removed today 8/2    EDW per nephrology: 76 kg    Heart transplant 6/14/18  Kidney listed 7/10/17    CURRENT NUTRITION ORDERS  Diet: Regular  Intake/Tolerance: Tolerating carb controlled diet. Eating well. Carb coverage - 1 uint/10 CHO.  100% of meals consumed    LABS  Hgb A1C: 7 (H), Na: 130 (L), Cr: 6.29 (H), BUN: 53 (H), Labs reviewed    MEDICATIONS  Cellcept, Medications reviewed    ANTHROPOMETRICS  Height: 175.3 cm (5' 9\")  Most Recent Weight: 78.7 kg (173 lb 8 oz)    IBW: 72.7 kg  BMI: Overweight BMI 25-29.9  Weight History: no significant wt loss noted  Wt Readings from Last 10 Encounters:   08/02/18 78.7 kg (173 lb 8 oz)   07/26/18 77.7 kg (171 lb 4.8 oz)   07/20/18 78.1 kg (172 lb 1.6 oz)   07/10/18 79.9 kg (176 lb 1.6 oz)   07/06/18 80.3 kg (177 lb)   07/05/18 77.8 kg (171 lb 8.3 oz)   07/02/18 78.4 kg (172 lb 12.8 oz)   06/21/18 76.3 kg (168 lb 4.8 oz)   04/16/18 76.9 kg (169 lb 9.6 oz)   04/10/18 76.7 kg (169 lb)     Dosing Weight: 77 kg (based on lowest wt this admit: 76.7 on 7/28)    ASSESSED NUTRITION NEEDS  Estimated Energy Needs: 4630-1386 kcals/day (25 - 30 kcals/kg)  Justification: Maintenance  Estimated Protein Needs:  grams protein/day (1.2 - 1.5 grams of pro/kg)  Justification: Increased needs dialysis   Estimated Fluid Needs: 1 mL/kcal/day   Justification: Maintenance    PHYSICAL FINDINGS  See malnutrition section below.  Oral lesion on roof of mouth - slough. Scars/bruises throughout  Large necrotic " mouth ulcer on hard palate - growing Pseudomonas.    MALNUTRITION  % Intake: No decreased intake noted  % Weight Loss: None noted  Subcutaneous Fat Loss: Unable to assess  Muscle Loss: Unable to assess  Fluid Accumulation/Edema: Does not meet criteria  Malnutrition Diagnosis: Unable to determine due to RD unable to complete NFPA d/t pt busy with cares    NUTRITION DIAGNOSIS  Predicted inadequate nutrient intake kcals/pro related to mouth ulcer which could inhibit PO intakes and prolonged LOS     INTERVENTIONS  Implementation  Nutrition Education: Unable to complete due to this writer unable to speak with pt, busy with cares     Goals  Patient to consume % of nutritionally adequate meal trays TID, or the equivalent with supplements/snacks.     Monitoring/Evaluation  Progress toward goals will be monitored and evaluated per protocol.      Zunilda May RD, MS, LD  6B- Pager: [phone]

## 2018-08-02 NOTE — PLAN OF CARE
Problem: Patient Care Overview  Goal: Plan of Care/Patient Progress Review  Outcome: No Change  Neuro: A/Ox4.  Cardiac: SR with HR 80 - 90. VSS. BP elevated 150's/90's - Cards 2 MD notified and okay with BP at this time.   Respiratory: O2 sats stable on RA.   GI/: Oliguric - 30mL in last 12 hours. BM x1.   Diet/appetite: NPO at 0000 for heart cath and biopsy today.   Activity:  Independent.   Pain: At acceptable level on current regimen. PRN tramadol administered x1.   Skin: Intact, no new deficits noted.     R: Continue with POC. Notify primary team with changes.    Problem: Diabetes Comorbidity  Goal: Diabetes  Patient comorbidity will be monitored for signs and symptoms of hyperglycemia or hypoglycemia. Problems will be absent, minimized or managed by discharge/transition of care.   Outcome: No Change  BG ACHS. Carb coverage and sliding scale insulin administered per orders.     Problem: Renal Insufficiency Comorbidity  Goal: Renal Insufficiency  Patient comorbidity will be monitored for signs and symptoms of Renal Insufficiency (Chronic) condition.  Problems will be absent, minimized or managed by discharge/transition of care.   Outcome: No Change  Pt on HD. Oliguric. Continue to monitor labs.

## 2018-08-02 NOTE — PROCEDURES
CARDIAC CATH REPORT:   PROCEDURES PERFORMED:   Endomyocardial Biopsy  Right Heart Catheterization    PHYSICIANS:  Attending Physician: Jordan Fox MD  Cardiology Fellow: Sukumar Mejía    INDICATION:  Murrya Nicholson is a 63 year old male with history significant for orthotopic heart transplantation. He presents to cath lab for elective, protocol heart biopsy and RHC.    DESCRIPTION:  1. Consent obtained with discussion of risks.  All questions were answered.  2. Sterile prep and procedure.  3. Location with Sheaths:   - 7 Fr LIJ  4. Access: Local anesthetic with lidocaine.  A standard 18 guage needle with ultrasound guidance was used to establish vascular access using a modified Seldinger technique.  5. Diagnostic Catheters:   7 Fr  [place] Jax  6. Estimated blood loss: < 25 ml    MEDICATIONS:  No medications were given.    Procedures:    HEMODYNAMICS:  1. HR 90 bpm  2. /105/129 mmHg  3. RA 3/4/2   4. RV 33/2  5. PA 31/12/20  6. PCW 8/7/6   7. PA sat 64.6%   8. PCW sat n/a  9. Hgb 7.9 g/dL   10. Kassi CO 6.4   11. Kassi CI 3.3   12. TD CO 6.6   13. TD CI 3.4   14. PVR 2.4  15. SVR 1539 (using TD CO)    ENDOMYOCARDIAL BIOPSY:  1. Successful collection of 5 right ventricular endomyocardial biopsy samples using the Jawz.    Sheath Removal:  - The left IJ sheath was removed in the cath lab.    Fluoroscopy Time: 3.3 min    COMPLICATIONS:  1. None    SUMMARY:   >> normal biventricular filling pressures  >> normal cardiac output  >> normal pulmonary artery pressures  >> successful collection of 5 endomyocardial biopsy samples, results pending    PLAN:   >> Return to Cards2.    The attending interventional cardiologist was present and supervised all critical aspects the procedure.    Findings discussed with patient and Cards2.    See CVIS report for final draft.    Sukumar Mejía  Cardiology Fellow          ATTENDING ATTESTATION: I was present and supervised the cardiology fellow throughout the procedure and reviewed the  findings with the fellow at the completion of the procedure.  I agree with the documentation above.    Jordan Fox MD, PhD  Interventional/Critical Care Cardiology  [phone]    August 2, 2018

## 2018-08-02 NOTE — PROGRESS NOTES
Nephrology Progress Note  08/02/2018       ASSESSMENT AND RECOMMENDATIONS:   Murray Nicholson is a 63 yr old male with PMH of HTN, DMII, CM s/p heart transplant (6/14/2018), ESRD, admitted with neutropenic fever and mouth ulcer concerning for HSV vs other virus, also GNR cultured from CVC 7/26. Found to have Pseudomonas aruginosa per mouth ulcer and CVC.      ESKD: due to DMII, dialyzes TTS schedule at [facility] under care of Dr Mcpherson. Access: tunneled RIJ (replaced 4/17/18). Run time: 4 hrs; EDW 76 kg. Is listed for transplant.  - Dialysis per TTS schedule   - Use low dose heparin on HD  - Heparin lock CVC       Volume: EDW 76 kg. Appears mildly hypervolemic. Oxygen 100% on RA  - Daily standing weights  - UF to dry weight today    BP: elevated, meds being adjusted by cardiology; currently on hydralazine 50 mg tid, lisinopril 5 mg qday (isosorbide dinitrate stopped due to concern for ongoing headache)     Anemia: hgb 7.9, s/p 1 unit PRBCs, recent labs with ferritin 876, IS 33, iron 74, hgb 7's; on epogen 7600 units per HD (recently increased). Hapto wnl, retic % 1.5, not appropriately elevated, parvo negative  - will increase epogen to 10,000 units per HD  - transfuse per primary team      BMD: calcium 8.4, alb 1.9, phos 2.6; recent ; currently not on Vit D analogue or phos binder      S/p OHT: on 6/14/18, on tacrolimus and pred with transplant team and transplant ID adjusting doses in setting of elevated tacrolimus level and leukopenia/neutropenia and concern for infection. Tacro and MMF restarted, valcyte on hold.   - Having heart bx today       Pseudomonas aruginosa sepsis: cultured from mouth ulcer and CVC; neutropenic, now resolved with IS changes; blood cx drawn at outpt dialysis unit on 7/26 two out of two with Pseudomonas auruginosa; mouth ulcer neg for HCV, positive for Pseudomonas aruginosa; CMV, EBV, varicella negative; transplant ID following. On cefepime  -  Mouth ulcer positive for  "Pseudomonas aruginosa; on cefepime  - CVC cx drawn at outpt dialysis unit on 7/26, two out of two with Pseudomonas aruginosa (sensitivities faxed and in the chart; pan-sensitive, LILIAN cefepime 4, levofloxacin 1, cipro 0.25)  -  Per ID, ok to treat through line infection, cefepime can be dosed for HD    Recommendations were communicated to primary team via this note.      MAYTE Garcia  Division of Kidney Disease  Pager [phone]    Interval History :   Seen on dialysis, on cefepime; continues to endorse headache; isosorbide stopped but hasn't helped yet; heart bx this afternoon; denies CP, SOB, n/v, chills     Review of Systems:   4 point ROS negative other than as noted above.    Physical Exam:   I/O last 3 completed shifts:  In: 920 [P.O.:820; I.V.:100]  Out: 30 [Urine:30]   BP (!) 165/105  Pulse 85  Temp 99  F (37.2  C) (Oral)  Resp 16  Ht 1.753 m (5' 9\")  Wt 78.7 kg (173 lb 8 oz)  SpO2 98%  BMI 25.62 kg/m2     GENERAL APPEARANCE: alert, NAD  EYES:  no scleral icterus, pupils equal  HENT: mouth without ulcers or lesions  PULM: lungs clear to auscultation  CV: regular rhythm, normal rate      -edema trace   GI: soft, nontender  INTEGUMENT: no cyanosis, no rash  Access tunneled RIJ    Labs:   All labs reviewed by me  Electrolytes/Renal -   Recent Labs   Lab Test  08/02/18   0530  08/01/18   0537  07/31/18   0446   07/27/18   0624   07/23/18   0914   NA  130*  131*  127*   < >  129*   < >  130*   POTASSIUM  4.4  4.2  4.9   < >  4.2   < >  5.3   CHLORIDE  93*  93*  93*   < >  96   < >  99   CO2  28  30  24   < >  21   < >  18*   BUN  53*  38*  78*   < >  36*   < >  68*   CR  6.29*  4.53*  7.07*   < >  4.19*   < >  7.09*   GLC  62*  74  85   < >  202*   < >  285*   TRINI  8.4*  8.6  8.4*   < >  7.8*   < >  8.6   MAG  1.8  1.6  1.8   < >  1.4*   --   1.7   PHOS   --    --   2.6   --   3.0   --   2.5    < > = values in this interval not displayed.       CBC -   Recent Labs   Lab Test  08/02/18   0530  " 08/01/18   0537  07/31/18   0446   WBC  4.5  3.4*  2.1*   HGB  7.9*  8.7*  7.9*   PLT  272  326  304       LFTs -   Recent Labs   Lab Test  07/29/18   0523  07/26/18   1732  07/18/18   0841   ALKPHOS  106  95  140   BILITOTAL  0.4  0.7  0.5   ALT  21  19  24   AST  26  18  14   PROTTOTAL  5.2*  6.1*  6.3*   ALBUMIN  1.9*  2.3*  2.9*       Iron Panel -   Recent Labs   Lab Test  07/10/18   0711  05/08/18   0954  07/19/17   1306   IRON  66  58  46   IRONSAT  28  27  18   ALBERTINA  771*  621*  369         Imaging:  Normal chest CT and head CT 7/26    Current Medications:    apixaban ANTICOAGULANT  2.5 mg Oral BID     aspirin  81 mg Oral Daily     biotin  5 mg Oral Daily     ceFEPIme (MAXIPIME) IV  1 g Intravenous Q24H     epoetin emily (EPOGEN,PROCRIT) inj ESRD  10,000 Units Intravenous Once in dialysis     fluticasone  1-2 spray Both Nostrils Daily     gelatin absorbable  1 each Topical During Hemodialysis (from stock)     heparin (porcine)  500 Units Hemodialysis Machine Once in dialysis     heparin  3 mL Intracatheter During Hemodialysis (from stock)     heparin  3 mL Intracatheter During Hemodialysis (from stock)     hydrALAZINE  50 mg Oral TID     insulin aspart  1-10 Units Subcutaneous TID AC     insulin aspart  1-7 Units Subcutaneous At Bedtime     insulin aspart   Subcutaneous QAM AC     insulin aspart   Subcutaneous Daily with lunch     insulin aspart   Subcutaneous Daily with supper     insulin isophane human  18 Units Subcutaneous Daily with supper     insulin isophane human  33 Units Subcutaneous QAM AC     lidocaine  1 patch Transdermal Q24h    And     lidocaine   Transdermal Q24H    And     lidocaine   Transdermal Q8H     lisinopril  5 mg Oral Daily     mycophenolate  500 mg Oral BID IS     nystatin  1,000,000 Units Swish & Swallow 4x Daily     pantoprazole  40 mg Oral QAM     predniSONE  15 mg Oral QPM     predniSONE  20 mg Oral QAM     rosuvastatin  20 mg Oral Daily     sodium chloride (PF)  3 mL Intracatheter  During Hemodialysis (from stock)     sodium chloride (PF)  3 mL Intracatheter During Hemodialysis (from stock)     sodium chloride (PF)  3 mL Intracatheter Q8H     tacrolimus  1 mg Oral QAM     tacrolimus  2 mg Oral At Bedtime       heparin (porcine)       - MEDICATION INSTRUCTIONS -       Kristi Armas PA-C

## 2018-08-02 NOTE — PROGRESS NOTES
[facility] Cardiology Progress Note           Assessment and Plan:   Mr. Nicholson is a 63 year old male who is s/p orthotopic heart transplant on 6/14/18 who presents to clinic for new heart transplant visit. Post-op course was complicated leukocytosis for which he received abx, RIJ thrombus infected with CoNS, an incidental pneumoperitoneum. He presents now with neutropenic fevers and is found to have 1 bottle with GNR and a large mouth ulcer growing Pseudomonas.     Changes today:   - RHC and biopsy today  - Continue cefepime     # Pseudomonal bacteremia, resolving  # Large necrotic mouth ulcer  # Mild prececal colitis  2/2 bottles at [facility] positive for Pseudmonas, will continue cefepime per ID.  Patient presented with neutropenic fevers, now clinically improving on abx.  Mouth ulcer unchanged on daily exams.  - Continue cefepime for 14 day course (7/27-7/30)      # Leukopenia  Improving in setting of decreasing tacro level and lower MMF. While this is only correlation would suspect that recent supra-therapeutic tacro level contributed to leukopenia. Chronic anemia likely secondary to ESRD, not having thrombocytopenia. Parvo virus negative.       # Status post OHT on 6/14/18, history of NICM  # Donor 3 vessel CAD  # Chronic immunosuppression  Planned for RHC and biopsy today.  - Rejection history: none  - Restarted Cellcept 250 mg bid on 7/31  - Restarted tacrolimus 1 mg BID on 7/31, increase to 1/2 mg daily (goal level was 10-12, will target 8 for now)  - Prednisone 20 mg / 15 mg  - Bx today, will do RHC as well     Prophylaxis:  - PCP: off Bactrim due to leukopenia, pentamidine 7/20/18, due 8/17/18  - Thrush: Nystatin   - PPI: pantoprazole 40 mg  - CAV: ASA 81 mg, Crestor 20 mg daily  - CMV (D+ / R-): holding Valcyte d/t leukopenia       Serostatus: CMV: D+/R-. EBV: D+/R pending      # HA  Chronic, possible due to isordil +/- tacrolimus corey in setting of high levels. Negative head CT on admission and no  neuro sx.   - changed isordil to ACEi  - monitor     # HTN  Mostly at goal, isolated BPs 160/100.   - isordil 20 mg TID, monitor HAs, hydralazine 50 mg TID  - per nephrology, ok to use ACEi      # RIJ Thrombus with CoNS  - continue Eliquis, hold tonight and AM before bx       # ESRD   - HD TTS, listed for renal transplant, EDW 76 kg     FEN: consistent carb  PROPHY:  Eliquis, PPI  LINES:  Right subclavian CVC, PIV  DISPO:  Pending, likely home early next week  CODE STATUS:  Full     Bobby Bearden MD PGY-3  Internal Medicine Resident  [phone]    Patient was seen by and the above plan discussed with Dr. Muse.         Subjective:   Overnight no acute events.  Continues to feel well, no acute concerns this AM.          Medications:       sodium chloride 0.9%  250 mL Intravenous Once in dialysis     sodium chloride 0.9%  300 mL Hemodialysis Machine Once     apixaban ANTICOAGULANT  2.5 mg Oral BID     aspirin  81 mg Oral Daily     biotin  5 mg Oral Daily     ceFEPIme (MAXIPIME) IV  1 g Intravenous Q24H     epoetin emily (EPOGEN,PROCRIT) inj ESRD  10,000 Units Intravenous Once in dialysis     fluticasone  1-2 spray Both Nostrils Daily     gelatin absorbable  1 each Topical During Hemodialysis (from stock)     heparin (porcine)  500 Units Hemodialysis Machine Once in dialysis     heparin  3 mL Intracatheter During Hemodialysis (from stock)     heparin  3 mL Intracatheter During Hemodialysis (from stock)     hydrALAZINE  50 mg Oral TID     insulin aspart  1-10 Units Subcutaneous TID AC     insulin aspart  1-7 Units Subcutaneous At Bedtime     insulin aspart   Subcutaneous QAM AC     insulin aspart   Subcutaneous Daily with lunch     insulin aspart   Subcutaneous Daily with supper     insulin isophane human  18 Units Subcutaneous Daily with supper     insulin isophane human  33 Units Subcutaneous QAM AC     lidocaine  1 patch Transdermal Q24h    And     lidocaine   Transdermal Q24H    And     lidocaine   Transdermal  Q8H     lisinopril  5 mg Oral Daily     mycophenolate  250 mg Oral BID IS     nystatin  1,000,000 Units Swish & Swallow 4x Daily     pantoprazole  40 mg Oral QAM     predniSONE  15 mg Oral QPM     predniSONE  20 mg Oral QAM     rosuvastatin  20 mg Oral Daily     sodium chloride (PF)  3 mL Intracatheter During Hemodialysis (from stock)     sodium chloride (PF)  3 mL Intracatheter During Hemodialysis (from stock)     sodium chloride (PF)  3 mL Intracatheter Q8H     tacrolimus  1 mg Oral QAM     tacrolimus  2 mg Oral At Bedtime       heparin (porcine)       - MEDICATION INSTRUCTIONS -                 Objective:          Physical Exam:   Temp:  [98.1  F (36.7  C)-98.4  F (36.9  C)] 98.2  F (36.8  C)  Heart Rate:  [86-97] 89  Resp:  [16] 16  BP: (106-155)/(71-98) 155/98  SpO2:  [89 %-100 %] 100 %  I/O last 3 completed shifts:  In: 920 [P.O.:820; I.V.:100]  Out: 30 [Urine:30]    Vitals:    07/30/18 1100 07/31/18 0423 07/31/18 0900 08/01/18 0527   Weight: 80.4 kg (177 lb 3.2 oz) 81.4 kg (179 lb 7.3 oz) 82.5 kg (181 lb 14.1 oz) 78.3 kg (172 lb 9.9 oz)    08/02/18 0629   Weight: 78.7 kg (173 lb 8 oz)       GEN: Interactive, appears comfortable, NAD.  HEENT:  Normocephalic/atraumatic, no scleral icterus, no nasal discharge, OP notable for non-draining ulcer on upper palate with necrotic-appearing base  CV:  Regular rate and rhythm, no murmur or JVD.   LUNGS:  On room air, normal work of breathing, Clear to auscultation bilaterally, no wheezes or crackles.   ABD:  Active bowel sounds, soft, non-tender/non-distended.  No rebound/guarding/rigidity.  EXT:  No edema or cyanosis.  Hands/feet warm to touch with good signs of peripheral perfusion.   SKIN:  Dry to touch, no exanthems noted in the visualized areas.  NEURO:  Alert and oriented  PSCYH: Normal mood and affect         Data:   BMP    Recent Labs  Lab 08/02/18  0530 08/01/18  0537 07/31/18  0446 07/30/18  0605   * 131* 127* 130*   POTASSIUM 4.4 4.2 4.9 4.7   CHLORIDE  93* 93* 93* 94   TRINI 8.4* 8.6 8.4* 8.0*   CO2 28 30 24 25   BUN 53* 38* 78* 61*   CR 6.29* 4.53* 7.07* 5.95*   GLC 62* 74 85 171*     LFTs    Recent Labs  Lab 07/29/18  0523 07/26/18  1732   ALKPHOS 106 95   AST 26 18   ALT 21 19   BILITOTAL 0.4 0.7   PROTTOTAL 5.2* 6.1*   ALBUMIN 1.9* 2.3*      CBC    Recent Labs  Lab 08/02/18  0530 08/01/18  0537 07/31/18  0446 07/30/18  0605   WBC 4.5 3.4* 2.1* 1.3*   RBC 2.67* 3.01* 2.74* 2.56*   HGB 7.9* 8.7* 7.9* 7.5*   HCT 24.0* 26.7* 24.0* 22.4*   MCV 90 89 88 88   MCH 29.6 28.9 28.8 29.3   MCHC 32.9 32.6 32.9 33.5   RDW 19.8* 19.4* 19.0* 19.1*    326 304 258     INR    Recent Labs  Lab 07/26/18  1732   INR 1.24*       I have reviewed today's vital signs, notes, medications, labs and imaging.  I have also seen and examined the patient and agree with the findings and plan as outlined above.  Pt with RHC/Bx and Dialysis today.  VSS with pt afebrile, HR 86, RR 16, 152/92.  Lungs clear and S1 and S2 without gallop. Labs with Cr 6.29, WBC 4.5 and Hgb 7.9.  Assessment: Pt with OHT and ESRD on HD admitted for leukopenia most likely caused by MMF/prograf.  Agree with advancing cellcept to 500 mg BID and increasing prograf to 2 mg BID.  RHC reveals CI 3.3 and PCW 8.      Bobby Muse MD, PhD  Professor, Heart Failure and Cardiac Transplantation  [facility]

## 2018-08-02 NOTE — PROGRESS NOTES
HEMODIALYSIS TREATMENT NOTE    Date: 8/2/2018  Time: 12:51 PM    Data:  Pre Wt: 78.7 kg    Desired Wt: 76.0 kg   Ultrafiltration - Post Run Net Total Removed (mL): 2700 mL  Ultrafiltration - Post Run Net Total Gain (mL): 0 mL  Vascular Access Status: Yes, secured and visible  Dialyzer Rinse: Streaked, Light  Total Blood Volume Processed: 81.2 L  Total Dialysis (Treatment) Time:  3.5 hours    Lab:   Yes - Tacrolimus level    Assessment/Interventions:  3.5 hours of HD via tunneled right subclavian CVC on K4Ca3 bath. Heparin bolus and infusion held per verbal from MAYTE Hernandes. 2.7 kg removed in attempt to reach desired post-run weight. Hypertensive prior to, throughout, and post. Other VSS on room air. Slept for most of run. Epogen administered as ordered. CVC Heparin locked post-treatment. See MAR for medication details. Report given to cardiac cath lab RN.       Plan:    Patient straight to cardiac cath lab post-HD. Regularly dialyzes TTS schedule. Next run per renal team.

## 2018-08-03 LAB
ANION GAP SERPL CALCULATED.3IONS-SCNC: 7 MMOL/L (ref 3–14)
ANISOCYTOSIS BLD QL SMEAR: SLIGHT
BACTERIA SPEC CULT: NO GROWTH
BACTERIA SPEC CULT: NO GROWTH
BASOPHILS # BLD AUTO: 0 10E9/L (ref 0–0.2)
BASOPHILS NFR BLD AUTO: 0 %
BUN SERPL-MCNC: 33 MG/DL (ref 7–30)
CALCIUM SERPL-MCNC: 8.1 MG/DL (ref 8.5–10.1)
CHLORIDE SERPL-SCNC: 100 MMOL/L (ref 94–109)
CMV DNA SPEC QL NAA+PROBE: NOT DETECTED
CO2 SERPL-SCNC: 28 MMOL/L (ref 20–32)
COPATH REPORT: NORMAL
CREAT SERPL-MCNC: 4.41 MG/DL (ref 0.66–1.25)
DIFFERENTIAL METHOD BLD: ABNORMAL
EOSINOPHIL # BLD AUTO: 0 10E9/L (ref 0–0.7)
EOSINOPHIL NFR BLD AUTO: 0 %
ERYTHROCYTE [DISTWIDTH] IN BLOOD BY AUTOMATED COUNT: 20.7 % (ref 10–15)
GFR SERPL CREATININE-BSD FRML MDRD: 14 ML/MIN/1.7M2
GLUCOSE BLDC GLUCOMTR-MCNC: 159 MG/DL (ref 70–99)
GLUCOSE BLDC GLUCOMTR-MCNC: 164 MG/DL (ref 70–99)
GLUCOSE BLDC GLUCOMTR-MCNC: 179 MG/DL (ref 70–99)
GLUCOSE BLDC GLUCOMTR-MCNC: 262 MG/DL (ref 70–99)
GLUCOSE BLDC GLUCOMTR-MCNC: 295 MG/DL (ref 70–99)
GLUCOSE SERPL-MCNC: 164 MG/DL (ref 70–99)
HCT VFR BLD AUTO: 24.3 % (ref 40–53)
HGB BLD-MCNC: 7.7 G/DL (ref 13.3–17.7)
LYMPHOCYTES # BLD AUTO: 0.3 10E9/L (ref 0.8–5.3)
LYMPHOCYTES NFR BLD AUTO: 7.2 %
Lab: NORMAL
Lab: NORMAL
MACROCYTES BLD QL SMEAR: PRESENT
MAGNESIUM SERPL-MCNC: 1.6 MG/DL (ref 1.6–2.3)
MCH RBC QN AUTO: 29.2 PG (ref 26.5–33)
MCHC RBC AUTO-ENTMCNC: 31.7 G/DL (ref 31.5–36.5)
MCV RBC AUTO: 92 FL (ref 78–100)
METAMYELOCYTES # BLD: 0.1 10E9/L
METAMYELOCYTES NFR BLD MANUAL: 1.8 %
MONOCYTES # BLD AUTO: 0.2 10E9/L (ref 0–1.3)
MONOCYTES NFR BLD AUTO: 5.4 %
NEUTROPHILS # BLD AUTO: 3.8 10E9/L (ref 1.6–8.3)
NEUTROPHILS NFR BLD AUTO: 85.6 %
NRBC # BLD AUTO: 0.1 10*3/UL
NRBC BLD AUTO-RTO: 2 /100
PLATELET # BLD AUTO: 255 10E9/L (ref 150–450)
POTASSIUM SERPL-SCNC: 4.9 MMOL/L (ref 3.4–5.3)
RBC # BLD AUTO: 2.64 10E12/L (ref 4.4–5.9)
SODIUM SERPL-SCNC: 135 MMOL/L (ref 133–144)
SPECIMEN SOURCE: NORMAL
TACROLIMUS BLD-MCNC: 7.7 UG/L (ref 5–15)
TME LAST DOSE: NORMAL H
WBC # BLD AUTO: 4.4 10E9/L (ref 4–11)

## 2018-08-03 PROCEDURE — 25000125 ZZHC RX 250: Performed by: STUDENT IN AN ORGANIZED HEALTH CARE EDUCATION/TRAINING PROGRAM

## 2018-08-03 PROCEDURE — 25000132 ZZH RX MED GY IP 250 OP 250 PS 637: Mod: GY | Performed by: INTERNAL MEDICINE

## 2018-08-03 PROCEDURE — 36415 COLL VENOUS BLD VENIPUNCTURE: CPT | Performed by: INTERNAL MEDICINE

## 2018-08-03 PROCEDURE — 85025 COMPLETE CBC W/AUTO DIFF WBC: CPT | Performed by: INTERNAL MEDICINE

## 2018-08-03 PROCEDURE — 99232 SBSQ HOSP IP/OBS MODERATE 35: CPT | Mod: GC | Performed by: INTERNAL MEDICINE

## 2018-08-03 PROCEDURE — 80048 BASIC METABOLIC PNL TOTAL CA: CPT | Performed by: INTERNAL MEDICINE

## 2018-08-03 PROCEDURE — 21400006 ZZH R&B CCU INTERMEDIATE UMMC

## 2018-08-03 PROCEDURE — 25000132 ZZH RX MED GY IP 250 OP 250 PS 637: Mod: GY | Performed by: STUDENT IN AN ORGANIZED HEALTH CARE EDUCATION/TRAINING PROGRAM

## 2018-08-03 PROCEDURE — 25000128 H RX IP 250 OP 636: Performed by: STUDENT IN AN ORGANIZED HEALTH CARE EDUCATION/TRAINING PROGRAM

## 2018-08-03 PROCEDURE — A9270 NON-COVERED ITEM OR SERVICE: HCPCS | Mod: GY | Performed by: INTERNAL MEDICINE

## 2018-08-03 PROCEDURE — 25000131 ZZH RX MED GY IP 250 OP 636 PS 637: Mod: GY | Performed by: HOSPITALIST

## 2018-08-03 PROCEDURE — 00000146 ZZHCL STATISTIC GLUCOSE BY METER IP

## 2018-08-03 PROCEDURE — 83735 ASSAY OF MAGNESIUM: CPT | Performed by: INTERNAL MEDICINE

## 2018-08-03 PROCEDURE — 80197 ASSAY OF TACROLIMUS: CPT | Performed by: INTERNAL MEDICINE

## 2018-08-03 PROCEDURE — A9270 NON-COVERED ITEM OR SERVICE: HCPCS | Mod: GY | Performed by: STUDENT IN AN ORGANIZED HEALTH CARE EDUCATION/TRAINING PROGRAM

## 2018-08-03 PROCEDURE — A9270 NON-COVERED ITEM OR SERVICE: HCPCS | Mod: GY | Performed by: NURSE PRACTITIONER

## 2018-08-03 PROCEDURE — 25000132 ZZH RX MED GY IP 250 OP 250 PS 637: Mod: GY | Performed by: NURSE PRACTITIONER

## 2018-08-03 PROCEDURE — 25000131 ZZH RX MED GY IP 250 OP 636 PS 637: Mod: GY | Performed by: STUDENT IN AN ORGANIZED HEALTH CARE EDUCATION/TRAINING PROGRAM

## 2018-08-03 RX ADMIN — ROSUVASTATIN CALCIUM 20 MG: 10 TABLET, FILM COATED ORAL at 07:53

## 2018-08-03 RX ADMIN — PREDNISONE 20 MG: 20 TABLET ORAL at 07:53

## 2018-08-03 RX ADMIN — PANTOPRAZOLE SODIUM 40 MG: 40 TABLET, DELAYED RELEASE ORAL at 07:53

## 2018-08-03 RX ADMIN — APIXABAN 2.5 MG: 2.5 TABLET, FILM COATED ORAL at 20:07

## 2018-08-03 RX ADMIN — Medication 50 MG: at 23:15

## 2018-08-03 RX ADMIN — HYDRALAZINE HYDROCHLORIDE 50 MG: 50 TABLET ORAL at 20:07

## 2018-08-03 RX ADMIN — LISINOPRIL 5 MG: 2.5 TABLET ORAL at 07:54

## 2018-08-03 RX ADMIN — MYCOPHENOLATE MOFETIL 500 MG: 250 CAPSULE ORAL at 07:54

## 2018-08-03 RX ADMIN — ACETAMINOPHEN 650 MG: 325 TABLET, FILM COATED ORAL at 18:57

## 2018-08-03 RX ADMIN — TACROLIMUS 2 MG: 1 CAPSULE ORAL at 18:21

## 2018-08-03 RX ADMIN — Medication 1 CAPSULE: at 08:36

## 2018-08-03 RX ADMIN — ACETAMINOPHEN 650 MG: 325 TABLET, FILM COATED ORAL at 08:36

## 2018-08-03 RX ADMIN — APIXABAN 2.5 MG: 2.5 TABLET, FILM COATED ORAL at 07:54

## 2018-08-03 RX ADMIN — MYCOPHENOLATE MOFETIL 500 MG: 250 CAPSULE ORAL at 18:21

## 2018-08-03 RX ADMIN — PREDNISONE 15 MG: 5 TABLET ORAL at 20:07

## 2018-08-03 RX ADMIN — CEFEPIME HYDROCHLORIDE 1 G: 1 INJECTION, POWDER, FOR SOLUTION INTRAMUSCULAR; INTRAVENOUS at 16:45

## 2018-08-03 RX ADMIN — HYDRALAZINE HYDROCHLORIDE 50 MG: 50 TABLET ORAL at 07:54

## 2018-08-03 RX ADMIN — NYSTATIN 1000000 UNITS: 100000 SUSPENSION ORAL at 16:45

## 2018-08-03 RX ADMIN — INSULIN ASPART 1 UNITS: 100 INJECTION, SOLUTION INTRAVENOUS; SUBCUTANEOUS at 08:36

## 2018-08-03 RX ADMIN — NYSTATIN 1000000 UNITS: 100000 SUSPENSION ORAL at 08:38

## 2018-08-03 RX ADMIN — Medication 5 MG: at 07:53

## 2018-08-03 RX ADMIN — LIDOCAINE 1 PATCH: 560 PATCH PERCUTANEOUS; TOPICAL; TRANSDERMAL at 20:08

## 2018-08-03 RX ADMIN — INSULIN ASPART 1 UNITS: 100 INJECTION, SOLUTION INTRAVENOUS; SUBCUTANEOUS at 13:33

## 2018-08-03 RX ADMIN — NYSTATIN 1000000 UNITS: 100000 SUSPENSION ORAL at 13:32

## 2018-08-03 RX ADMIN — ASPIRIN 81 MG: 81 TABLET, COATED ORAL at 20:07

## 2018-08-03 RX ADMIN — FLUTICASONE PROPIONATE 2 SPRAY: 50 SPRAY, METERED NASAL at 07:55

## 2018-08-03 RX ADMIN — TACROLIMUS 2 MG: 1 CAPSULE ORAL at 07:54

## 2018-08-03 RX ADMIN — HYDRALAZINE HYDROCHLORIDE 50 MG: 50 TABLET ORAL at 15:02

## 2018-08-03 RX ADMIN — NYSTATIN 1000000 UNITS: 100000 SUSPENSION ORAL at 20:18

## 2018-08-03 RX ADMIN — INSULIN HUMAN 33 UNITS: 100 INJECTION, SUSPENSION SUBCUTANEOUS at 08:38

## 2018-08-03 ASSESSMENT — ACTIVITIES OF DAILY LIVING (ADL)
ADLS_ACUITY_SCORE: 11

## 2018-08-03 ASSESSMENT — PAIN DESCRIPTION - DESCRIPTORS: DESCRIPTORS: HEADACHE

## 2018-08-03 NOTE — PROGRESS NOTES
[facility] Cardiology Progress Note           Assessment and Plan:   Mr. Nicholson is a 63 year old male who is s/p orthotopic heart transplant on 6/14/18 who presents to clinic for new heart transplant visit. Post-op course was complicated leukocytosis for which he received abx, RIJ thrombus infected with CoNS, an incidental pneumoperitoneum. He presents now with neutropenic fevers and is found to have 1 bottle with GNR and a large mouth ulcer growing Pseudomonas.     Changes today:   - No acute cellular rejection on heart biopsy  - Continue cefepime     # Pseudomonal bacteremia, resolving  # Large necrotic mouth ulcer  # Mild prececal colitis  2/2 bottles at [facility] positive for Pseudmonas, will continue cefepime per ID.  Patient presented with neutropenic fevers, now clinically improving on abx.  Mouth ulcer unchanged on daily exams.  - Continue cefepime for 14 day course      # Leukopenia  Improving in setting of decreasing tacro level and lower MMF. While this is only correlation would suspect that recent supra-therapeutic tacro level contributed to leukopenia. Chronic anemia likely secondary to ESRD, not having thrombocytopenia. Parvo virus negative.      # Status post OHT on 6/14/18, history of NICM  # Donor 3 vessel CAD  # Chronic immunosuppression  Underwent biopsy, result pending today.  - Rejection history: none  - Restarted Cellcept 250 mg bid on 7/31  - Restarted tacrolimus 1 mg BID on 7/31, increase to 1/2 mg daily (goal level was 10-12, will target 8 for now)  - Prednisone 20 mg / 15 mg     Prophylaxis:  - PCP: off Bactrim due to leukopenia, pentamidine 7/20/18, due 8/17/18  - Thrush: Nystatin   - PPI: pantoprazole 40 mg  - CAV: ASA 81 mg, Crestor 20 mg daily  - CMV (D+ / R-): holding Valcyte d/t leukopenia       Serostatus: CMV: D+/R-. EBV: D+/R pending      # HA  Chronic, possible due to isordil +/- tacrolimus corey in setting of high levels. Negative head CT on admission and no neuro sx.   -  changed isordil to ACEi this admission  - monitor     # HTN  Mostly at goal, isolated BPs 160/100.   - isordil 20 mg TID, monitor HAs, hydralazine 50 mg TID  - per nephrology, ok to use ACEi      # RIJ Thrombus with CoNS  - continue Eliquis, hold tonight and AM before bx       # ESRD   - HD TTS, listed for renal transplant, EDW 76 kg     FEN: consistent carb  PROPHY:  Eliquis, PPI  LINES: Right subclavian CVC, PIV  DISPO: Likely home early next week  CODE STATUS:  Full     Bobby Bearden MD PGY-3  Internal Medicine Resident  [phone]    Patient was seen by and the above plan discussed with Dr. Austin.         Subjective:   Overnight no acute events.  Continues to feel well, no acute concerns this AM.          Medications:       apixaban ANTICOAGULANT  2.5 mg Oral BID     aspirin  81 mg Oral Daily     biotin  5 mg Oral Daily     ceFEPIme (MAXIPIME) IV  1 g Intravenous Q24H     fluticasone  1-2 spray Both Nostrils Daily     hydrALAZINE  50 mg Oral TID     insulin aspart  1-10 Units Subcutaneous TID AC     insulin aspart  1-7 Units Subcutaneous At Bedtime     insulin aspart   Subcutaneous QAM AC     insulin aspart   Subcutaneous Daily with lunch     insulin aspart   Subcutaneous Daily with supper     insulin isophane human  33 Units Subcutaneous QAM AC     insulin isophane human  9 Units Subcutaneous Daily with supper     lidocaine  1 patch Transdermal Q24h    And     lidocaine   Transdermal Q24H    And     lidocaine   Transdermal Q8H     lisinopril  5 mg Oral Daily     mycophenolate  500 mg Oral BID IS     nystatin  1,000,000 Units Swish & Swallow 4x Daily     pantoprazole  40 mg Oral QAM     predniSONE  15 mg Oral QPM     predniSONE  20 mg Oral QAM     rosuvastatin  20 mg Oral Daily     sodium chloride (PF)  3 mL Intracatheter Q8H     tacrolimus  2 mg Oral QAM     tacrolimus  2 mg Oral At Bedtime       - MEDICATION INSTRUCTIONS -                 Objective:          Physical Exam:   Temp:  [97.9  F (36.6  C)-99   F (37.2  C)] 97.9  F (36.6  C)  Heart Rate:  [80-97] 85  Resp:  [16-18] 16  BP: (104-185)/() 155/106  SpO2:  [94 %-100 %] 100 %  I/O last 3 completed shifts:  In: 660 [P.O.:660]  Out: 2780 [Urine:80; Other:2700]    Vitals:    07/31/18 0423 07/31/18 0900 08/01/18 0527 08/02/18 0629   Weight: 81.4 kg (179 lb 7.3 oz) 82.5 kg (181 lb 14.1 oz) 78.3 kg (172 lb 9.9 oz) 78.7 kg (173 lb 8 oz)    08/03/18 0517   Weight: 77 kg (169 lb 12.8 oz)       GEN: Interactive, appears comfortable, NAD.  HEENT:  Normocephalic/atraumatic, no scleral icterus, no nasal discharge, OP notable for non-draining ulcer on upper palate with necrotic-appearing base, unchanged from serial exams  CV:  Regular rate and rhythm, no murmur or JVD.   LUNGS:  On room air, normal work of breathing, Clear to auscultation bilaterally, no wheezes or crackles.   EXT:  No edema or cyanosis.  Hands/feet warm to touch with good signs of peripheral perfusion.   SKIN:  Dry to touch, no exanthems noted in the visualized areas.  NEURO:  Alert and oriented  PSCYH: Normal mood and affect         Data:   BMP    Recent Labs  Lab 08/03/18 0449 08/02/18  0530 08/01/18  0537 07/31/18  0446    130* 131* 127*   POTASSIUM 4.9 4.4 4.2 4.9   CHLORIDE 100 93* 93* 93*   TRINI 8.1* 8.4* 8.6 8.4*   CO2 28 28 30 24   BUN 33* 53* 38* 78*   CR 4.41* 6.29* 4.53* 7.07*   * 62* 74 85     LFTs    Recent Labs  Lab 07/29/18  0523   ALKPHOS 106   AST 26   ALT 21   BILITOTAL 0.4   PROTTOTAL 5.2*   ALBUMIN 1.9*      CBC    Recent Labs  Lab 08/03/18  0449 08/02/18  0530 08/01/18  0537 07/31/18  0446   WBC 4.4 4.5 3.4* 2.1*   RBC 2.64* 2.67* 3.01* 2.74*   HGB 7.7* 7.9* 8.7* 7.9*   HCT 24.3* 24.0* 26.7* 24.0*   MCV 92 90 89 88   MCH 29.2 29.6 28.9 28.8   MCHC 31.7 32.9 32.6 32.9   RDW 20.7* 19.8* 19.4* 19.0*    272 326 304     INR  No lab results found in last 7 days.      I personally provided care for this patient, reviewed chart, discussed course with patient, students  and consulting physicians.  I answered all questions.    Rhett Austin M.D.  Division of Cardiology  Department of Medicine

## 2018-08-03 NOTE — PLAN OF CARE
Problem: Patient Care Overview  Goal: Plan of Care/Patient Progress Review  Outcome: No Change  Neuro: Patient is alert and oriented. Neuros intact.   Cardiac: Normal Sinus Rhythm. Rates 80-100s. VSS. Hypertensive throughout the shift.    Respiratory: Sating 98%  on RA. Lung sounds clear/diminished.   GI/: Minimal urine output this shift. BM today.   Diet/appetite: Tolerating regular diet. Eating well. Blood sugars stable. Carbohydrate coverage given. Patient denies any nausea.   Activity:  Independent, up to chair and in halls.  Pain: Patient denies any pain, but does have persistent headache that is tolerable.   Skin: No new deficits noted.  LDA's: Right PIV saline locked. Right CVC in place.     Plan: Continue with POC. Notify primary team with changes.      Problem: Infection, Risk/Actual (Adult)  Goal: Identify Related Risk Factors and Signs and Symptoms  Related risk factors and signs and symptoms are identified upon initiation of Human Response Clinical Practice Guideline (CPG).   Outcome: No Change   08/03/18 1802   Infection, Risk/Actual   Related Risk Factors (Infection, Risk/Actual) immunosuppressed   Signs and Symptoms (Infection, Risk/Actual) blood glucose changes;lab value changes

## 2018-08-03 NOTE — PLAN OF CARE
Problem: Patient Care Overview  Goal: Plan of Care/Patient Progress Review  Outcome: No Change  Neuro: A/Ox4.   Cardiac: SR 80 - 90. VSS. BP as high as 180's/110's after dialysis.  Came down with scheduled meds.  Respiratory: O2 sats stable on RA.   GI/: Oliguric BM x1.   Diet/appetite: Tolerating regular diet.  Eating well.    Activity:  Independent.   Pain: At acceptable level on current regimen. PRN tramadol and tylenol not given.    Skin: Intact, no new deficits noted.      R: Completed heart biopsy today.  L internal jugular access point, dressing c/d/i, no hematoma.  Note states possible discharge early next week.

## 2018-08-03 NOTE — PROGRESS NOTES
Transplant Social Work Services Progress Note      Date of Initial Social Work Evaluation: Admission note completed 7/27/18  Collaborated with: Patient    Data: Murray was sitting up in his hospital bed eating breakfast and watching TV. He is still on droplet precautions and has a really low white blood cell count. He reports that it is getting better, however, and that he is actually feeling better. Still complaining of a headache.  Intervention: Met with Murray and inquired into questions/concerns. Assessed coping and discussed the patient's discharge plan  Assessment: Murray was friendly and talkative. Reports that he hopefully won't be here too much longer. Reports generally, that life is good post-transplant and has no complaints or issues to discuss with this writer.  Education provided by SW: Coverage for usual SW and ongoing availability  Plan:    Discharge Plans in Progress: Home when medically stable    Barriers to d/c plan: Not ready yet    Follow up Plan: SW will remain available if needs arise, but currently, he identifies no SW discharge planning needs. He should discharge home to his apartment where he lives alone with his Sons coming over and checking on him.

## 2018-08-03 NOTE — PLAN OF CARE
"Problem: Patient Care Overview  Goal: Plan of Care/Patient Progress Review  Outcome: Improving  BP (!) 155/113 (BP Location: Left arm)  Pulse 85  Temp 97.9  F (36.6  C) (Oral)  Resp 16  Ht 1.753 m (5' 9\")  Wt 77 kg (169 lb 12.8 oz)  SpO2 100%  BMI 25.08 kg/m2  Neuro: A&Ox4. Follows commands. PERRLA.  Cardiac: SR. HR 90s. SBP 100s-155. Afebrile.   Respiratory: Sating at 100% on RA.  GI/: Adequate urine output. BM X1.  Diet/appetite: Tolerating carb controlled diet. Eating well.  Activity:  Independent.  Pain: At acceptable level on current regimen. Managed with lidocaine patch and ultram.  Skin: No new deficits noted.  LDA's: Rt CVC. Rt PIV.    Plan: Continue with POC. Notify primary team with changes.        Problem: Diabetes Comorbidity  Goal: Diabetes  Patient comorbidity will be monitored for signs and symptoms of hyperglycemia or hypoglycemia. Problems will be absent, minimized or managed by discharge/transition of care.   Outcome: Improving   Blood sugars 170s-204. Will continue to monitor.    Problem: Renal Insufficiency Comorbidity  Goal: Renal Insufficiency  Patient comorbidity will be monitored for signs and symptoms of Renal Insufficiency (Chronic) condition.  Problems will be absent, minimized or managed by discharge/transition of care.   Outcome: No Change  Oliguric d/t dialysis. One unmeasured void this morning. Dialysis Tuesday, Thursday, and Saturday. Creatinine trending down. Will continue to monitor.      "

## 2018-08-04 LAB
ANION GAP SERPL CALCULATED.3IONS-SCNC: 6 MMOL/L (ref 3–14)
BASOPHILS # BLD AUTO: 0 10E9/L (ref 0–0.2)
BASOPHILS NFR BLD AUTO: 0.2 %
BUN SERPL-MCNC: 56 MG/DL (ref 7–30)
CALCIUM SERPL-MCNC: 8.6 MG/DL (ref 8.5–10.1)
CHLORIDE SERPL-SCNC: 101 MMOL/L (ref 94–109)
CO2 SERPL-SCNC: 28 MMOL/L (ref 20–32)
CREAT SERPL-MCNC: 6.56 MG/DL (ref 0.66–1.25)
DIFFERENTIAL METHOD BLD: ABNORMAL
EOSINOPHIL # BLD AUTO: 0 10E9/L (ref 0–0.7)
EOSINOPHIL NFR BLD AUTO: 0 %
ERYTHROCYTE [DISTWIDTH] IN BLOOD BY AUTOMATED COUNT: 20.6 % (ref 10–15)
GFR SERPL CREATININE-BSD FRML MDRD: 9 ML/MIN/1.7M2
GLUCOSE BLDC GLUCOMTR-MCNC: 138 MG/DL (ref 70–99)
GLUCOSE BLDC GLUCOMTR-MCNC: 198 MG/DL (ref 70–99)
GLUCOSE BLDC GLUCOMTR-MCNC: 238 MG/DL (ref 70–99)
GLUCOSE BLDC GLUCOMTR-MCNC: 269 MG/DL (ref 70–99)
GLUCOSE BLDC GLUCOMTR-MCNC: 296 MG/DL (ref 70–99)
GLUCOSE SERPL-MCNC: 148 MG/DL (ref 70–99)
HCT VFR BLD AUTO: 25.9 % (ref 40–53)
HGB BLD-MCNC: 8.2 G/DL (ref 13.3–17.7)
IMM GRANULOCYTES # BLD: 0.2 10E9/L (ref 0–0.4)
IMM GRANULOCYTES NFR BLD: 4.6 %
LYMPHOCYTES # BLD AUTO: 0.4 10E9/L (ref 0.8–5.3)
LYMPHOCYTES NFR BLD AUTO: 7.9 %
MAGNESIUM SERPL-MCNC: 1.7 MG/DL (ref 1.6–2.3)
MCH RBC QN AUTO: 29.2 PG (ref 26.5–33)
MCHC RBC AUTO-ENTMCNC: 31.7 G/DL (ref 31.5–36.5)
MCV RBC AUTO: 92 FL (ref 78–100)
MONOCYTES # BLD AUTO: 0.4 10E9/L (ref 0–1.3)
MONOCYTES NFR BLD AUTO: 7.5 %
NEUTROPHILS # BLD AUTO: 3.8 10E9/L (ref 1.6–8.3)
NEUTROPHILS NFR BLD AUTO: 79.8 %
NRBC # BLD AUTO: 0 10*3/UL
NRBC BLD AUTO-RTO: 1 /100
PLATELET # BLD AUTO: 273 10E9/L (ref 150–450)
POTASSIUM SERPL-SCNC: 5.2 MMOL/L (ref 3.4–5.3)
RBC # BLD AUTO: 2.81 10E12/L (ref 4.4–5.9)
SODIUM SERPL-SCNC: 134 MMOL/L (ref 133–144)
TACROLIMUS BLD-MCNC: 9.4 UG/L (ref 5–15)
TME LAST DOSE: NORMAL H
WBC # BLD AUTO: 4.8 10E9/L (ref 4–11)

## 2018-08-04 PROCEDURE — 25000125 ZZHC RX 250: Performed by: STUDENT IN AN ORGANIZED HEALTH CARE EDUCATION/TRAINING PROGRAM

## 2018-08-04 PROCEDURE — 25000128 H RX IP 250 OP 636: Performed by: STUDENT IN AN ORGANIZED HEALTH CARE EDUCATION/TRAINING PROGRAM

## 2018-08-04 PROCEDURE — A9270 NON-COVERED ITEM OR SERVICE: HCPCS | Mod: GY | Performed by: INTERNAL MEDICINE

## 2018-08-04 PROCEDURE — 25000132 ZZH RX MED GY IP 250 OP 250 PS 637: Mod: GY | Performed by: STUDENT IN AN ORGANIZED HEALTH CARE EDUCATION/TRAINING PROGRAM

## 2018-08-04 PROCEDURE — A9270 NON-COVERED ITEM OR SERVICE: HCPCS | Mod: GY | Performed by: STUDENT IN AN ORGANIZED HEALTH CARE EDUCATION/TRAINING PROGRAM

## 2018-08-04 PROCEDURE — 00000146 ZZHCL STATISTIC GLUCOSE BY METER IP

## 2018-08-04 PROCEDURE — 36415 COLL VENOUS BLD VENIPUNCTURE: CPT | Performed by: INTERNAL MEDICINE

## 2018-08-04 PROCEDURE — 25000132 ZZH RX MED GY IP 250 OP 250 PS 637: Mod: GY | Performed by: INTERNAL MEDICINE

## 2018-08-04 PROCEDURE — 80048 BASIC METABOLIC PNL TOTAL CA: CPT | Performed by: INTERNAL MEDICINE

## 2018-08-04 PROCEDURE — A9270 NON-COVERED ITEM OR SERVICE: HCPCS | Mod: GY | Performed by: NURSE PRACTITIONER

## 2018-08-04 PROCEDURE — 25000128 H RX IP 250 OP 636: Performed by: INTERNAL MEDICINE

## 2018-08-04 PROCEDURE — 85025 COMPLETE CBC W/AUTO DIFF WBC: CPT | Performed by: INTERNAL MEDICINE

## 2018-08-04 PROCEDURE — 21400006 ZZH R&B CCU INTERMEDIATE UMMC

## 2018-08-04 PROCEDURE — 90937 HEMODIALYSIS REPEATED EVAL: CPT

## 2018-08-04 PROCEDURE — 83735 ASSAY OF MAGNESIUM: CPT | Performed by: INTERNAL MEDICINE

## 2018-08-04 PROCEDURE — 25000131 ZZH RX MED GY IP 250 OP 636 PS 637: Mod: GY | Performed by: STUDENT IN AN ORGANIZED HEALTH CARE EDUCATION/TRAINING PROGRAM

## 2018-08-04 PROCEDURE — 25000132 ZZH RX MED GY IP 250 OP 250 PS 637: Mod: GY | Performed by: NURSE PRACTITIONER

## 2018-08-04 PROCEDURE — 99232 SBSQ HOSP IP/OBS MODERATE 35: CPT | Mod: GC | Performed by: INTERNAL MEDICINE

## 2018-08-04 PROCEDURE — 63400005 ZZH RX 634: Performed by: INTERNAL MEDICINE

## 2018-08-04 PROCEDURE — 80197 ASSAY OF TACROLIMUS: CPT | Performed by: INTERNAL MEDICINE

## 2018-08-04 RX ORDER — LISINOPRIL 2.5 MG/1
10 TABLET ORAL DAILY
Status: DISCONTINUED | OUTPATIENT
Start: 2018-08-05 | End: 2018-08-05

## 2018-08-04 RX ORDER — MENTHOL AND ZINC OXIDE .44; 20.625 G/100G; G/100G
OINTMENT TOPICAL 4 TIMES DAILY PRN
Status: DISCONTINUED | OUTPATIENT
Start: 2018-08-04 | End: 2018-08-04

## 2018-08-04 RX ORDER — MENTHOL AND ZINC OXIDE .44; 20.625 G/100G; G/100G
OINTMENT TOPICAL ONCE
Status: DISCONTINUED | OUTPATIENT
Start: 2018-08-04 | End: 2018-08-04

## 2018-08-04 RX ORDER — ZINC OXIDE 20 %
OINTMENT (GRAM) TOPICAL ONCE
Status: DISCONTINUED | OUTPATIENT
Start: 2018-08-04 | End: 2018-08-06 | Stop reason: HOSPADM

## 2018-08-04 RX ORDER — ZINC OXIDE 200 MG/G
PASTE TOPICAL ONCE
Status: DISCONTINUED | OUTPATIENT
Start: 2018-08-04 | End: 2018-08-04 | Stop reason: RX

## 2018-08-04 RX ORDER — TACROLIMUS 1 MG/1
2 CAPSULE ORAL AT BEDTIME
Status: DISCONTINUED | OUTPATIENT
Start: 2018-08-05 | End: 2018-08-04

## 2018-08-04 RX ORDER — HEPARIN SODIUM 1000 [USP'U]/ML
500 INJECTION, SOLUTION INTRAVENOUS; SUBCUTANEOUS
Status: COMPLETED | OUTPATIENT
Start: 2018-08-04 | End: 2018-08-04

## 2018-08-04 RX ORDER — HEPARIN SODIUM 1000 [USP'U]/ML
500 INJECTION, SOLUTION INTRAVENOUS; SUBCUTANEOUS CONTINUOUS
Status: DISCONTINUED | OUTPATIENT
Start: 2018-08-04 | End: 2018-08-04

## 2018-08-04 RX ORDER — LISINOPRIL 5 MG/1
5 TABLET ORAL ONCE
Status: COMPLETED | OUTPATIENT
Start: 2018-08-04 | End: 2018-08-04

## 2018-08-04 RX ORDER — TACROLIMUS 1 MG/1
1 CAPSULE ORAL ONCE
Status: COMPLETED | OUTPATIENT
Start: 2018-08-04 | End: 2018-08-04

## 2018-08-04 RX ORDER — TACROLIMUS 1 MG/1
1 CAPSULE ORAL
Status: DISCONTINUED | OUTPATIENT
Start: 2018-08-05 | End: 2018-08-06 | Stop reason: HOSPADM

## 2018-08-04 RX ORDER — LISINOPRIL 2.5 MG/1
5 TABLET ORAL DAILY
Status: DISCONTINUED | OUTPATIENT
Start: 2018-08-04 | End: 2018-08-04

## 2018-08-04 RX ORDER — TACROLIMUS 1 MG/1
2 CAPSULE ORAL
Status: DISCONTINUED | OUTPATIENT
Start: 2018-08-05 | End: 2018-08-06 | Stop reason: HOSPADM

## 2018-08-04 RX ADMIN — Medication 1 CAPSULE: at 07:50

## 2018-08-04 RX ADMIN — HYDRALAZINE HYDROCHLORIDE 50 MG: 50 TABLET ORAL at 07:57

## 2018-08-04 RX ADMIN — HEPARIN SODIUM 500 UNITS/HR: 1000 INJECTION, SOLUTION INTRAVENOUS; SUBCUTANEOUS at 09:01

## 2018-08-04 RX ADMIN — NYSTATIN 1000000 UNITS: 100000 SUSPENSION ORAL at 16:08

## 2018-08-04 RX ADMIN — PREDNISONE 20 MG: 20 TABLET ORAL at 07:50

## 2018-08-04 RX ADMIN — HYDRALAZINE HYDROCHLORIDE 50 MG: 50 TABLET ORAL at 19:34

## 2018-08-04 RX ADMIN — HEPARIN SODIUM 500 UNITS: 1000 INJECTION, SOLUTION INTRAVENOUS; SUBCUTANEOUS at 08:50

## 2018-08-04 RX ADMIN — MYCOPHENOLATE MOFETIL 500 MG: 250 CAPSULE ORAL at 07:50

## 2018-08-04 RX ADMIN — PREDNISONE 15 MG: 5 TABLET ORAL at 17:30

## 2018-08-04 RX ADMIN — ACETAMINOPHEN 650 MG: 325 TABLET, FILM COATED ORAL at 04:20

## 2018-08-04 RX ADMIN — CEFEPIME HYDROCHLORIDE 1 G: 1 INJECTION, POWDER, FOR SOLUTION INTRAMUSCULAR; INTRAVENOUS at 17:29

## 2018-08-04 RX ADMIN — NYSTATIN 1000000 UNITS: 100000 SUSPENSION ORAL at 13:26

## 2018-08-04 RX ADMIN — ROSUVASTATIN CALCIUM 20 MG: 10 TABLET, FILM COATED ORAL at 07:50

## 2018-08-04 RX ADMIN — INSULIN HUMAN 33 UNITS: 100 INJECTION, SUSPENSION SUBCUTANEOUS at 08:02

## 2018-08-04 RX ADMIN — PANTOPRAZOLE SODIUM 40 MG: 40 TABLET, DELAYED RELEASE ORAL at 07:50

## 2018-08-04 RX ADMIN — HYDRALAZINE HYDROCHLORIDE 50 MG: 50 TABLET ORAL at 13:25

## 2018-08-04 RX ADMIN — NYSTATIN 1000000 UNITS: 100000 SUSPENSION ORAL at 19:34

## 2018-08-04 RX ADMIN — INSULIN ASPART 3 UNITS: 100 INJECTION, SOLUTION INTRAVENOUS; SUBCUTANEOUS at 10:19

## 2018-08-04 RX ADMIN — TACROLIMUS 2 MG: 1 CAPSULE ORAL at 07:51

## 2018-08-04 RX ADMIN — EPOETIN ALFA 10000 UNITS: 10000 SOLUTION INTRAVENOUS; SUBCUTANEOUS at 12:03

## 2018-08-04 RX ADMIN — LISINOPRIL 5 MG: 5 TABLET ORAL at 15:54

## 2018-08-04 RX ADMIN — HEPARIN SODIUM 3000 UNITS: 1000 INJECTION, SOLUTION INTRAVENOUS; SUBCUTANEOUS at 12:30

## 2018-08-04 RX ADMIN — Medication 50 MG: at 23:12

## 2018-08-04 RX ADMIN — LISINOPRIL 5 MG: 2.5 TABLET ORAL at 07:49

## 2018-08-04 RX ADMIN — ACETAMINOPHEN 650 MG: 325 TABLET, FILM COATED ORAL at 13:26

## 2018-08-04 RX ADMIN — Medication 5 MG: at 07:50

## 2018-08-04 RX ADMIN — APIXABAN 2.5 MG: 2.5 TABLET, FILM COATED ORAL at 19:35

## 2018-08-04 RX ADMIN — INSULIN ASPART 4 UNITS: 100 INJECTION, SOLUTION INTRAVENOUS; SUBCUTANEOUS at 13:26

## 2018-08-04 RX ADMIN — SODIUM CHLORIDE 250 ML: 9 INJECTION, SOLUTION INTRAVENOUS at 08:50

## 2018-08-04 RX ADMIN — ASPIRIN 81 MG: 81 TABLET, COATED ORAL at 19:34

## 2018-08-04 RX ADMIN — APIXABAN 2.5 MG: 2.5 TABLET, FILM COATED ORAL at 07:49

## 2018-08-04 RX ADMIN — LIDOCAINE 1 PATCH: 560 PATCH PERCUTANEOUS; TOPICAL; TRANSDERMAL at 19:32

## 2018-08-04 RX ADMIN — FLUTICASONE PROPIONATE 2 SPRAY: 50 SPRAY, METERED NASAL at 07:51

## 2018-08-04 RX ADMIN — TACROLIMUS 1 MG: 1 CAPSULE ORAL at 17:30

## 2018-08-04 RX ADMIN — MYCOPHENOLATE MOFETIL 500 MG: 250 CAPSULE ORAL at 17:30

## 2018-08-04 RX ADMIN — NYSTATIN 1000000 UNITS: 100000 SUSPENSION ORAL at 07:49

## 2018-08-04 RX ADMIN — SODIUM CHLORIDE 300 ML: 9 INJECTION, SOLUTION INTRAVENOUS at 08:50

## 2018-08-04 RX ADMIN — INSULIN ASPART 6 UNITS: 100 INJECTION, SOLUTION INTRAVENOUS; SUBCUTANEOUS at 19:35

## 2018-08-04 ASSESSMENT — ACTIVITIES OF DAILY LIVING (ADL)
ADLS_ACUITY_SCORE: 11

## 2018-08-04 ASSESSMENT — PAIN DESCRIPTION - DESCRIPTORS: DESCRIPTORS: HEADACHE

## 2018-08-04 NOTE — PROGRESS NOTES
This RN cared for pt from 1900 to 2145. A&O X4, VSS, afebrile, HR sinus rhythm 90's w/ murmur, BP's 150-170's/100's, O2 sats > 90% on RA. LS clear, BS+ X4, CMS intact. Lidoderm patch in place to posterior neck. PIV in R arm patent & SL. CVC X2 in R chest patent & Heparin locked. Tolerating regular diet with no nausea/emesis. Gets up ad edith, ambulates independently. Passing gas, had BM today. Chest incision OBED & WDL with liquid bandage. Mouth lesion on palate intact. Able to make needs known via call light, will transfer to [address] [place] [address], plan for dialysis tomorrow AM.

## 2018-08-04 NOTE — PROGRESS NOTES
D:Patient arrived to 6C from 6B transfer.  PMH of OHT on 6/14/18 post-op complicated by leukocytosis.  Admitted for neutropenic fever and oral mouth ulcer (roof of the mouth).  I/A: On dialysis today, on nystatin for oral ulcer, RHC on 8/2 biopsy pending findings.  On tele SR, c/o HA and received Ultram and Tylenol with relief.  P: Will facilitate dialysis run and monitor BP.

## 2018-08-04 NOTE — PLAN OF CARE
Problem: Patient Care Overview  Goal: Plan of Care/Patient Progress Review  Outcome: No Change  D: Pt s/p heart tx 6/18- admitted for neutropenic fevers, large mouth ulcer + for pseudomonas    I: Monitored vitals and assessed pt status.   Running: PIV SL (int abx)  PRN: tylenol (given x1), tramadol (pt likes to take this before bedtime)    A: A0x4. BP elevated this AM 180s/100s- all BP meds given prior to dialysis. BP stable throughout dilaysis, and upon return from dialysis 130s/80s. C2 increased lisinopril to 10mg daily- pt still needs to receive second dose of 5mg today to equal 10mg. Dialysis run this AM- 2.2 L removed. Pt up ad edith. Eating well- regular diet. BG checks- slightly elevated 238 @ 1300- corrected per sliding scale, but pt still needs carb coverage when he returns from cafeteria. Pt c/o constant headache- treated with tylenol, pt likes to take tramadol at bedtime. Oliguric. BM x2 this AM. Pt pleasant, cooperative.     P: Likely dc Monday on abx. Continue to monitor BP. Continue to monitor Pt status and report changes to treatment team.

## 2018-08-04 NOTE — PROGRESS NOTES
[facility] Cardiology Progress Note           Assessment and Plan:   Mr. Nicholson is a 63 year old male who is s/p orthotopic heart transplant on 6/14/18 who presents to clinic for new heart transplant visit. Post-op course was complicated leukocytosis for which he received abx, RIJ thrombus infected with CoNS, an incidental pneumoperitoneum. He presents now with neutropenic fevers and is found to have 1 bottle with GNR and a large mouth ulcer growing Pseudomonas.     Changes today:   - No acute cellular rejection on heart biopsy  - Up-titrating immunosuppressants as tolerated  - Continue cefepime     # Pseudomonal bacteremia, resolving  # Large necrotic mouth ulcer  # Mild prececal colitis  2/2 bottles at [facility] positive for Pseudmonas, will continue cefepime per ID.  Patient presented with neutropenic fevers, now clinically improving on abx.  Mouth ulcer unchanged on daily exams.  - Continue cefepime for 14 day course      # Leukopenia, resolved  Up-titrating immunosuppressants carefully with assistance of pharmacy given recent complications.      # Status post OHT on 6/14/18, history of NICM  # Donor 3 vessel CAD  # Chronic immunosuppression  Underwent biopsy, result pending today.  - Rejection history: none  - Cellcept 500 mg BID currently, increasing as tolerated  - Restarted tacrolimus 1 mg BID on 7/31, increase to 2/2 mg daily (goal level was 10-12, will target 8 for now)  - Prednisone 15 mg / 15 mg     Prophylaxis:  - PCP: off Bactrim due to leukopenia, pentamidine 7/20/18, due 8/17/18  - Thrush: Nystatin   - PPI: pantoprazole 40 mg  - CAV: ASA 81 mg, Crestor 20 mg daily  - CMV (D+ / R-): Likely restart valcyte prior to discharge       Serostatus: CMV: D+/R-. EBV: D+/R pending      # HA  Chronic, possible due to isordil +/- tacrolimus corey in setting of high levels. Negative head CT on admission and no neuro sx.   - changed isordil to ACEi this admission  - monitor     # HTN  Remains relatively  hypertensive, increasing lisinopril dose today  - isordil 20 mg TID, monitor HAs, hydralazine 50 mg TID  - per nephrology, ok to use ACEi      # RIJ Thrombus with CoNS  - continue Eliquis      # ESRD   - HD TTS, listed for renal transplant, EDW 76 kg     FEN: consistent carb  PROPHY:  Eliquis, PPI  LINES: Right subclavian CVC, PIV  DISPO: Likely home early next week with IV antibiotics  CODE STATUS:  Full     Bobby Bearden MD PGY-3  Internal Medicine Resident  [phone]    Patient was seen by and the above plan discussed with Dr. Austin.         Subjective:   Overnight no acute events.  Continues to feel well, no acute concerns this AM.          Medications:       apixaban ANTICOAGULANT  2.5 mg Oral BID     aspirin  81 mg Oral Daily     biotin  5 mg Oral Daily     ceFEPIme (MAXIPIME) IV  1 g Intravenous Q24H     fluticasone  1-2 spray Both Nostrils Daily     gelatin absorbable  1 each Topical During Hemodialysis (from stock)     heparin  3 mL Intracatheter During Hemodialysis (from stock)     heparin  3 mL Intracatheter During Hemodialysis (from stock)     hydrALAZINE  50 mg Oral TID     insulin aspart  1-10 Units Subcutaneous TID AC     insulin aspart  1-7 Units Subcutaneous At Bedtime     insulin aspart   Subcutaneous QAM AC     insulin aspart   Subcutaneous Daily with lunch     insulin aspart   Subcutaneous Daily with supper     insulin isophane human  33 Units Subcutaneous QAM AC     insulin isophane human  9 Units Subcutaneous Daily with supper     lidocaine  1 patch Transdermal Q24h    And     lidocaine   Transdermal Q24H    And     lidocaine   Transdermal Q8H     [START ON 8/5/2018] lisinopril  10 mg Oral Daily     lisinopril  5 mg Oral Once     mycophenolate  500 mg Oral BID IS     NEPHROCAPS  1 capsule Oral Daily     nystatin  1,000,000 Units Swish & Swallow 4x Daily     pantoprazole  40 mg Oral QAM     predniSONE  15 mg Oral BID w/meals     rosuvastatin  20 mg Oral Daily     sodium chloride (PF)  3  mL Intracatheter During Hemodialysis (from stock)     sodium chloride (PF)  3 mL Intracatheter During Hemodialysis (from stock)     sodium chloride (PF)  3 mL Intracatheter Q8H     tacrolimus  2 mg Oral QAM     tacrolimus  2 mg Oral At Bedtime       heparin (porcine) 500 Units/hr (08/04/18 0901)     - MEDICATION INSTRUCTIONS -                 Objective:          Physical Exam:   Temp:  [97.5  F (36.4  C)-98.8  F (37.1  C)] 98.2  F (36.8  C)  Heart Rate:  [80-93] 87  Resp:  [18] 18  BP: (111-187)/() 135/78  SpO2:  [96 %-100 %] 100 %  I/O last 3 completed shifts:  In: 360 [P.O.:360]  Out: -     Vitals:    07/31/18 0900 08/01/18 0527 08/02/18 0629 08/03/18 0517   Weight: 82.5 kg (181 lb 14.1 oz) 78.3 kg (172 lb 9.9 oz) 78.7 kg (173 lb 8 oz) 77 kg (169 lb 12.8 oz)    08/04/18 0600   Weight: 78.2 kg (172 lb 6.4 oz)       GEN: Interactive, appears comfortable, NAD.  HEENT:  Normocephalic/atraumatic, no scleral icterus, no nasal discharge, OP notable for non-draining ulcer on upper palate with necrotic-appearing base, unchanged from serial exams  CV:  Regular rate and rhythm, no murmur or JVD.   LUNGS:  On room air, normal work of breathing, Clear to auscultation bilaterally, no wheezes or crackles.   EXT:  No edema or cyanosis.  Hands/feet warm to touch with good signs of peripheral perfusion.   SKIN:  Dry to touch, no exanthems noted in the visualized areas.  NEURO:  Alert and oriented  PSCYH: Normal mood and affect         Data:   BMP    Recent Labs  Lab 08/04/18  0644 08/03/18  0449 08/02/18  0530 08/01/18  0537    135 130* 131*   POTASSIUM 5.2 4.9 4.4 4.2   CHLORIDE 101 100 93* 93*   TRINI 8.6 8.1* 8.4* 8.6   CO2 28 28 28 30   BUN 56* 33* 53* 38*   CR 6.56* 4.41* 6.29* 4.53*   * 164* 62* 74     LFTs    Recent Labs  Lab 07/29/18  0523   ALKPHOS 106   AST 26   ALT 21   BILITOTAL 0.4   PROTTOTAL 5.2*   ALBUMIN 1.9*      CBC    Recent Labs  Lab 08/04/18  0644 08/03/18  0449 08/02/18  0530 08/01/18  0537    WBC 4.8 4.4 4.5 3.4*   RBC 2.81* 2.64* 2.67* 3.01*   HGB 8.2* 7.7* 7.9* 8.7*   HCT 25.9* 24.3* 24.0* 26.7*   MCV 92 92 90 89   MCH 29.2 29.2 29.6 28.9   MCHC 31.7 31.7 32.9 32.6   RDW 20.6* 20.7* 19.8* 19.4*    255 272 326     INR  No lab results found in last 7 days.     I personally saw and examined the patient and discussed with student and agree with note above.    I would expect discharge on home antibiotics in the next 48 hours.

## 2018-08-04 NOTE — PROGRESS NOTES
HEMODIALYSIS TREATMENT NOTE    Date: 8/4/2018  Time: 12:52 PM    Data:  Pre Wt: 78.2 kg   Desired Wt: 76 kg   Ultrafiltration - Post Run Net Total Removed (mL): 2200 mL  Ultrafiltration - Post Run Net Total Gain (mL): 0 mL  Vascular Access Status: Yes, secured and visible  Dialyzer Rinse: Clear, Streaked  Total Blood Volume Processed: 78.0 L  Total Dialysis (Treatment) Time:  3.5 hours    Lab:   No    Assessment/Interventions:  3.5 hours of HD with Heparin via tunneled right subclavian CVC on K2Ca3 bath. Tolerated well. Patient was hypertensive prior to treatment but BPs stabilized during run. All other VSS stable throughout on room air. 2.2 kg total removed without complications in attempt to reach desired post-run weight of 76.0 kg. 14 units total of insulin coverage given with breakfast for BG of 198 and carb coverage. Epogen administered as ordered. CVC site care done and dressing changed. CVC Heparin locked per orders. See MAR for medication details. Report given to primary RN on 6C.       Plan:    Regularly dialyzes TTS schedule. Next HD per renal team.

## 2018-08-04 NOTE — PROGRESS NOTES
Murray Nicholson is a 63 yr old male with PMH of HTN, DMII, CM s/p heart transplant (6/14/2018), ESRD, admitted with neutropenic fever and mouth ulcer concerning for HSV vs other virus, also GNR cultured from CVC 7/26. Found to have Pseudomonas aruginosa per mouth ulcer and CVC.    This patient was seen and examined while on dialysis. Professional oversight of the patient's dialysis care, access care and dialysis related co-morbidities were addressed as necessary with the patient and / or staff.     Access TDC RIJ    UF Goal 2-3L     Exam  /72 mmHG  CTA  no edema    Recent Labs   Lab Test  08/04/18   0644  08/03/18   0449   NA  134  135   POTASSIUM  5.2  4.9   CHLORIDE  101  100   CO2  28  28   ANIONGAP  6  7   GLC  148*  164*   BUN  56*  33*   CR  6.56*  4.41*   TRINI  8.6  8.1*       Plan -  Stable dialysis  Next HD on Tuesday    David Fried MD   August 4, 2018

## 2018-08-05 LAB
ANION GAP SERPL CALCULATED.3IONS-SCNC: 6 MMOL/L (ref 3–14)
ANISOCYTOSIS BLD QL SMEAR: ABNORMAL
BASOPHILS # BLD AUTO: 0 10E9/L (ref 0–0.2)
BASOPHILS NFR BLD AUTO: 0 %
BUN SERPL-MCNC: 35 MG/DL (ref 7–30)
CALCIUM SERPL-MCNC: 8.5 MG/DL (ref 8.5–10.1)
CHLORIDE SERPL-SCNC: 100 MMOL/L (ref 94–109)
CO2 SERPL-SCNC: 30 MMOL/L (ref 20–32)
CREAT SERPL-MCNC: 4.7 MG/DL (ref 0.66–1.25)
DIFFERENTIAL METHOD BLD: ABNORMAL
EOSINOPHIL # BLD AUTO: 0 10E9/L (ref 0–0.7)
EOSINOPHIL NFR BLD AUTO: 0 %
ERYTHROCYTE [DISTWIDTH] IN BLOOD BY AUTOMATED COUNT: 21.1 % (ref 10–15)
GFR SERPL CREATININE-BSD FRML MDRD: 13 ML/MIN/1.7M2
GLUCOSE BLDC GLUCOMTR-MCNC: 113 MG/DL (ref 70–99)
GLUCOSE BLDC GLUCOMTR-MCNC: 133 MG/DL (ref 70–99)
GLUCOSE BLDC GLUCOMTR-MCNC: 147 MG/DL (ref 70–99)
GLUCOSE BLDC GLUCOMTR-MCNC: 176 MG/DL (ref 70–99)
GLUCOSE SERPL-MCNC: 153 MG/DL (ref 70–99)
HCT VFR BLD AUTO: 26.3 % (ref 40–53)
HGB BLD-MCNC: 8.3 G/DL (ref 13.3–17.7)
LYMPHOCYTES # BLD AUTO: 0.3 10E9/L (ref 0.8–5.3)
LYMPHOCYTES NFR BLD AUTO: 6.5 %
MAGNESIUM SERPL-MCNC: 1.6 MG/DL (ref 1.6–2.3)
MCH RBC QN AUTO: 29.2 PG (ref 26.5–33)
MCHC RBC AUTO-ENTMCNC: 31.6 G/DL (ref 31.5–36.5)
MCV RBC AUTO: 93 FL (ref 78–100)
METAMYELOCYTES # BLD: 0.2 10E9/L
METAMYELOCYTES NFR BLD MANUAL: 3.7 %
MONOCYTES # BLD AUTO: 0.2 10E9/L (ref 0–1.3)
MONOCYTES NFR BLD AUTO: 3.7 %
NEUTROPHILS # BLD AUTO: 3.6 10E9/L (ref 1.6–8.3)
NEUTROPHILS NFR BLD AUTO: 84.3 %
NRBC # BLD AUTO: 0 10*3/UL
NRBC BLD AUTO-RTO: 1 /100
PLATELET # BLD AUTO: 247 10E9/L (ref 150–450)
PLATELET # BLD EST: ABNORMAL 10*3/UL
POIKILOCYTOSIS BLD QL SMEAR: SLIGHT
POTASSIUM SERPL-SCNC: 4.7 MMOL/L (ref 3.4–5.3)
PROMYELOCYTES # BLD MANUAL: 0.1 10E9/L
PROMYELOCYTES NFR BLD MANUAL: 1.8 %
RBC # BLD AUTO: 2.84 10E12/L (ref 4.4–5.9)
RBC INCLUSIONS BLD: SLIGHT
SODIUM SERPL-SCNC: 136 MMOL/L (ref 133–144)
WBC # BLD AUTO: 4.3 10E9/L (ref 4–11)

## 2018-08-05 PROCEDURE — 40000802 ZZH SITE CHECK

## 2018-08-05 PROCEDURE — 85025 COMPLETE CBC W/AUTO DIFF WBC: CPT | Performed by: INTERNAL MEDICINE

## 2018-08-05 PROCEDURE — 25000125 ZZHC RX 250: Performed by: STUDENT IN AN ORGANIZED HEALTH CARE EDUCATION/TRAINING PROGRAM

## 2018-08-05 PROCEDURE — 25000132 ZZH RX MED GY IP 250 OP 250 PS 637: Mod: GY | Performed by: INTERNAL MEDICINE

## 2018-08-05 PROCEDURE — 21400006 ZZH R&B CCU INTERMEDIATE UMMC

## 2018-08-05 PROCEDURE — 25000132 ZZH RX MED GY IP 250 OP 250 PS 637: Mod: GY | Performed by: NURSE PRACTITIONER

## 2018-08-05 PROCEDURE — 25000131 ZZH RX MED GY IP 250 OP 636 PS 637: Mod: GY | Performed by: INTERNAL MEDICINE

## 2018-08-05 PROCEDURE — A9270 NON-COVERED ITEM OR SERVICE: HCPCS | Mod: GY | Performed by: STUDENT IN AN ORGANIZED HEALTH CARE EDUCATION/TRAINING PROGRAM

## 2018-08-05 PROCEDURE — 83735 ASSAY OF MAGNESIUM: CPT | Performed by: INTERNAL MEDICINE

## 2018-08-05 PROCEDURE — A9270 NON-COVERED ITEM OR SERVICE: HCPCS | Mod: GY | Performed by: INTERNAL MEDICINE

## 2018-08-05 PROCEDURE — 25000131 ZZH RX MED GY IP 250 OP 636 PS 637: Mod: GY | Performed by: STUDENT IN AN ORGANIZED HEALTH CARE EDUCATION/TRAINING PROGRAM

## 2018-08-05 PROCEDURE — 25000132 ZZH RX MED GY IP 250 OP 250 PS 637: Mod: GY | Performed by: STUDENT IN AN ORGANIZED HEALTH CARE EDUCATION/TRAINING PROGRAM

## 2018-08-05 PROCEDURE — 36415 COLL VENOUS BLD VENIPUNCTURE: CPT | Performed by: INTERNAL MEDICINE

## 2018-08-05 PROCEDURE — 00000146 ZZHCL STATISTIC GLUCOSE BY METER IP

## 2018-08-05 PROCEDURE — A9270 NON-COVERED ITEM OR SERVICE: HCPCS | Mod: GY | Performed by: NURSE PRACTITIONER

## 2018-08-05 PROCEDURE — 80048 BASIC METABOLIC PNL TOTAL CA: CPT | Performed by: INTERNAL MEDICINE

## 2018-08-05 PROCEDURE — 40000141 ZZH STATISTIC PERIPHERAL IV START W/O US GUIDANCE

## 2018-08-05 PROCEDURE — 99232 SBSQ HOSP IP/OBS MODERATE 35: CPT | Mod: GC | Performed by: INTERNAL MEDICINE

## 2018-08-05 PROCEDURE — 25000128 H RX IP 250 OP 636: Performed by: STUDENT IN AN ORGANIZED HEALTH CARE EDUCATION/TRAINING PROGRAM

## 2018-08-05 RX ORDER — LISINOPRIL 2.5 MG/1
10 TABLET ORAL ONCE
Status: COMPLETED | OUTPATIENT
Start: 2018-08-05 | End: 2018-08-05

## 2018-08-05 RX ORDER — CLONIDINE HYDROCHLORIDE 0.1 MG/1
0.1 TABLET ORAL AT BEDTIME
Status: DISCONTINUED | OUTPATIENT
Start: 2018-08-05 | End: 2018-08-06 | Stop reason: HOSPADM

## 2018-08-05 RX ORDER — LISINOPRIL 20 MG/1
20 TABLET ORAL DAILY
Status: DISCONTINUED | OUTPATIENT
Start: 2018-08-06 | End: 2018-08-06 | Stop reason: HOSPADM

## 2018-08-05 RX ADMIN — CLONIDINE HYDROCHLORIDE 0.1 MG: 0.1 TABLET ORAL at 22:05

## 2018-08-05 RX ADMIN — NYSTATIN 1000000 UNITS: 100000 SUSPENSION ORAL at 13:44

## 2018-08-05 RX ADMIN — INSULIN ASPART 2 UNITS: 100 INJECTION, SOLUTION INTRAVENOUS; SUBCUTANEOUS at 13:45

## 2018-08-05 RX ADMIN — FLUTICASONE PROPIONATE 2 SPRAY: 50 SPRAY, METERED NASAL at 08:17

## 2018-08-05 RX ADMIN — MYCOPHENOLATE MOFETIL 500 MG: 250 CAPSULE ORAL at 08:16

## 2018-08-05 RX ADMIN — Medication 5 MG: at 08:16

## 2018-08-05 RX ADMIN — ASPIRIN 81 MG: 81 TABLET, COATED ORAL at 20:50

## 2018-08-05 RX ADMIN — LISINOPRIL 10 MG: 2.5 TABLET ORAL at 08:16

## 2018-08-05 RX ADMIN — PANTOPRAZOLE SODIUM 40 MG: 40 TABLET, DELAYED RELEASE ORAL at 08:16

## 2018-08-05 RX ADMIN — PREDNISONE 15 MG: 5 TABLET ORAL at 17:54

## 2018-08-05 RX ADMIN — MYCOPHENOLATE MOFETIL 500 MG: 250 CAPSULE ORAL at 17:54

## 2018-08-05 RX ADMIN — HYDRALAZINE HYDROCHLORIDE 50 MG: 50 TABLET ORAL at 20:47

## 2018-08-05 RX ADMIN — CEFEPIME HYDROCHLORIDE 1 G: 1 INJECTION, POWDER, FOR SOLUTION INTRAMUSCULAR; INTRAVENOUS at 17:34

## 2018-08-05 RX ADMIN — Medication 50 MG: at 09:58

## 2018-08-05 RX ADMIN — LIDOCAINE 1 PATCH: 560 PATCH PERCUTANEOUS; TOPICAL; TRANSDERMAL at 20:46

## 2018-08-05 RX ADMIN — APIXABAN 2.5 MG: 2.5 TABLET, FILM COATED ORAL at 08:16

## 2018-08-05 RX ADMIN — ROSUVASTATIN CALCIUM 20 MG: 10 TABLET, FILM COATED ORAL at 08:16

## 2018-08-05 RX ADMIN — APIXABAN 2.5 MG: 2.5 TABLET, FILM COATED ORAL at 20:50

## 2018-08-05 RX ADMIN — HYDRALAZINE HYDROCHLORIDE 50 MG: 50 TABLET ORAL at 08:16

## 2018-08-05 RX ADMIN — ACETAMINOPHEN 650 MG: 325 TABLET, FILM COATED ORAL at 09:58

## 2018-08-05 RX ADMIN — INSULIN HUMAN 33 UNITS: 100 INJECTION, SUSPENSION SUBCUTANEOUS at 08:16

## 2018-08-05 RX ADMIN — PREDNISONE 15 MG: 5 TABLET ORAL at 08:17

## 2018-08-05 RX ADMIN — TACROLIMUS 1 MG: 1 CAPSULE ORAL at 08:16

## 2018-08-05 RX ADMIN — Medication 50 MG: at 22:05

## 2018-08-05 RX ADMIN — LISINOPRIL 10 MG: 2.5 TABLET ORAL at 10:04

## 2018-08-05 RX ADMIN — TACROLIMUS 2 MG: 1 CAPSULE ORAL at 17:54

## 2018-08-05 RX ADMIN — Medication 1 CAPSULE: at 08:16

## 2018-08-05 RX ADMIN — HYDRALAZINE HYDROCHLORIDE 50 MG: 50 TABLET ORAL at 13:44

## 2018-08-05 RX ADMIN — NYSTATIN 1000000 UNITS: 100000 SUSPENSION ORAL at 20:51

## 2018-08-05 RX ADMIN — NYSTATIN 1000000 UNITS: 100000 SUSPENSION ORAL at 08:16

## 2018-08-05 RX ADMIN — NYSTATIN 1000000 UNITS: 100000 SUSPENSION ORAL at 15:47

## 2018-08-05 ASSESSMENT — ACTIVITIES OF DAILY LIVING (ADL)
ADLS_ACUITY_SCORE: 11

## 2018-08-05 NOTE — PROVIDER NOTIFICATION
Dr. Phelan was notified about the patient having a streak of blood in patient's stool.  Per provider no need for any action at this time.  Will continue to trend Hbg and any status changes.

## 2018-08-05 NOTE — PROGRESS NOTES
Problem: Patient Care Overview  Goal: Plan of Care/Patient Progress Review  Outcome: No Change  D: Pt s/p heart tx 6/18- admitted for neutropenic fevers, large mouth ulcer + for pseudomonas     I: Monitored vitals and assessed pt status.   Running: PIV SL (int abx)  PRN: tylenol (given x1), tramadol (given x1)     A: A0x4. BP continues to be elevated- sx aware, increased lisinopril to 20mg daily. Most recent BP check 150s/100s (sx ok with this- and will update call provider parameters). Sating 90s on RA. SR. Pt up ad edith. Eating well- regular diet. BG checks- sliding scale, carb coverage, humulin. Pt c/o constant headache- treated with tylenol, tramadol (pt likes to take tramadol in evening before bed). Oliguric. BM this AM- red streaked per night nurse, sx aware. Pt pleasant, cooperative.       P: Dc once plan for abx outpatient established/approved by insurance. Continue to monitor BP. Continue to monitor Pt status and report changes to treatment team.

## 2018-08-05 NOTE — PLAN OF CARE
"Problem: Patient Care Overview  Goal: Plan of Care/Patient Progress Review  Outcome: No Change  BP (!) 166/99 (BP Location: Left arm)  Pulse 85  Temp 98  F (36.7  C) (Oral)  Resp 18  Ht 1.753 m (5' 9\")  Wt 78.2 kg (172 lb 6.4 oz)  SpO2 100%  BMI 25.46 kg/m2    Alert and oriented x4. BP elevated. Sinus rhythm. Sats 100% on room air. Denies pain. On regular diet with good appetite. BM x2. Oliguric on dialysis. PIV saline locked. Up independently ambulating in the halls. Plan for possible dc on Monday. Will continue to monitor and notify MDs accordingly.      "

## 2018-08-05 NOTE — PROGRESS NOTES
[facility] Cardiology Progress Note           Assessment and Plan:   Mr. Nicholson is a 63 year old male who is s/p orthotopic heart transplant on 6/14/18 who presents to clinic for new heart transplant visit. Post-op course was complicated leukocytosis for which he received abx, RIJ thrombus infected with CoNS, an incidental pneumoperitoneum. He presents now with neutropenic fevers and is found to have 1 bottle with GNR and a large mouth ulcer growing Pseudomonas.     Changes today:   - Due for repeat tacro level in the AM  - Continue cefepime     # Pseudomonal bacteremia, resolving  # Large necrotic mouth ulcer  # Mild prececal colitis  2/2 bottles at [facility] positive for Pseudmonas, will continue cefepime per ID.  Patient presented with neutropenic fevers, now clinically improving on abx.  Mouth ulcer unchanged on daily exams.  Discussed with care coordinator, patient's cefepime is currently not approved for outpatient infusion.  Will need to be approved prior to patient discharging as he will be on cefepime through Thursday (8/9).  - Continue cefepime for 14 day course      # Leukopenia, resolved  Up-titrating immunosuppressants carefully with assistance of pharmacy given recent complications.      # Status post OHT on 6/14/18, history of NICM  # Donor 3 vessel CAD  # Chronic immunosuppression  Underwent biopsy with no evidence of acute rejection.  - Rejection history: none  - Cellcept 500 mg BID currently, increasing as tolerated  - Restarted tacrolimus 1 mg BID on 7/31, increase to 2/2 mg daily (goal level was 10-12, will target 8 for now)  - Prednisone 15 mg / 15 mg     Prophylaxis:  - PCP: off Bactrim due to leukopenia, pentamidine 7/20/18, due 8/17/18  - Thrush: Nystatin   - PPI: pantoprazole 40 mg  - CAV: ASA 81 mg, Crestor 20 mg daily  - CMV (D+ / R-): Holding valcyte      Serostatus: CMV: D+/R-. EBV: D+/R pending      # HA  Chronic, possible due to isordil +/- tacrolimus corey in setting of high  levels. Negative head CT on admission and no neuro sx.   - changed isordil to ACEi this admission  - monitor     # HTN  Remains hypertensive, increasing lisinopril dose again today and adding clonidine.  - monitor HAs, hydralazine 50 mg TID, lisinopril 20 mg PO QD  - per nephrology, ok to use ACEi      # RIJ Thrombus with CoNS  - continue Eliquis      # ESRD   - HD TTS, listed for renal transplant, EDW 76 kg     FEN: consistent carb  PROPHY:  Eliquis, PPI  LINES: Right subclavian CVC, PIV  DISPO: Likely home early next week with IV antibiotics  CODE STATUS:  Full     Bobby Bearden MD PGY-3  Internal Medicine Resident  [phone]    Patient was seen by and the above plan discussed with Dr. Austin.         Subjective:   Overnight no acute events.  Continues to feel well, no acute concerns this AM.          Medications:       apixaban ANTICOAGULANT  2.5 mg Oral BID     aspirin  81 mg Oral Daily     biotin  5 mg Oral Daily     ceFEPIme (MAXIPIME) IV  1 g Intravenous Q24H     cloNIDine  0.1 mg Oral At Bedtime     fluticasone  1-2 spray Both Nostrils Daily     hydrALAZINE  50 mg Oral TID     insulin aspart  1-10 Units Subcutaneous TID AC     insulin aspart  1-7 Units Subcutaneous At Bedtime     insulin aspart   Subcutaneous QAM AC     insulin aspart   Subcutaneous Daily with lunch     insulin aspart   Subcutaneous Daily with supper     insulin isophane human  15 Units Subcutaneous Daily with supper     insulin isophane human  33 Units Subcutaneous QAM AC     lidocaine  1 patch Transdermal Q24h    And     lidocaine   Transdermal Q24H    And     lidocaine   Transdermal Q8H     [START ON 8/6/2018] lisinopril  20 mg Oral Daily     mycophenolate  500 mg Oral BID IS     NEPHROCAPS  1 capsule Oral Daily     nystatin  1,000,000 Units Swish & Swallow 4x Daily     pantoprazole  40 mg Oral QAM     predniSONE  15 mg Oral BID w/meals     rosuvastatin  20 mg Oral Daily     sodium chloride (PF)  3 mL Intracatheter Q8H      tacrolimus  1 mg Oral QAM     tacrolimus  2 mg Oral QPM     zinc oxide   Topical Once       - MEDICATION INSTRUCTIONS -                 Objective:          Physical Exam:   Temp:  [98  F (36.7  C)-99  F (37.2  C)] 99  F (37.2  C)  Heart Rate:  [86-93] 87  Resp:  [16-18] 16  BP: (136-181)/() 153/105  SpO2:  [97 %-100 %] 100 %  I/O last 3 completed shifts:  In: 1200 [P.O.:1200]  Out: -     Vitals:    08/01/18 0527 08/02/18 0629 08/03/18 0517 08/04/18 0600   Weight: 78.3 kg (172 lb 9.9 oz) 78.7 kg (173 lb 8 oz) 77 kg (169 lb 12.8 oz) 78.2 kg (172 lb 6.4 oz)    08/05/18 1000   Weight: 77.5 kg (170 lb 13.7 oz)       GEN: Interactive, appears comfortable, NAD.  HEENT:  Normocephalic/atraumatic, no scleral icterus, no nasal discharge, OP notable for non-draining ulcer on upper palate with necrotic-appearing base, unchanged from serial exams  CV:  Regular rate and rhythm, no murmur or JVD.   LUNGS:  On room air, normal work of breathing, Clear to auscultation bilaterally, no wheezes or crackles.   EXT:  No edema or cyanosis.  Hands/feet warm to touch with good signs of peripheral perfusion.   SKIN:  Dry to touch, no exanthems noted in the visualized areas.  NEURO:  Alert and oriented  PSCYH: Normal mood and affect         Data:   BMP    Recent Labs  Lab 08/05/18  0708 08/04/18  0644 08/03/18  0449 08/02/18  0530    134 135 130*   POTASSIUM 4.7 5.2 4.9 4.4   CHLORIDE 100 101 100 93*   TRINI 8.5 8.6 8.1* 8.4*   CO2 30 28 28 28   BUN 35* 56* 33* 53*   CR 4.70* 6.56* 4.41* 6.29*   * 148* 164* 62*     LFTs  No lab results found in last 7 days.   CBC    Recent Labs  Lab 08/05/18  0708 08/04/18  0644 08/03/18  0449 08/02/18  0530   WBC 4.3 4.8 4.4 4.5   RBC 2.84* 2.81* 2.64* 2.67*   HGB 8.3* 8.2* 7.7* 7.9*   HCT 26.3* 25.9* 24.3* 24.0*   MCV 93 92 92 90   MCH 29.2 29.2 29.2 29.6   MCHC 31.6 31.7 31.7 32.9   RDW 21.1* 20.6* 20.7* 19.8*    273 255 272     INR  No lab results found in last 7 days.   I  personally provided care for this patient, reviewed chart, discussed course with patient, housestaff and consulting physicians.  I answered all questions.    Rhett Austin M.D.  Division of Cardiology  Department of Medicine

## 2018-08-05 NOTE — PROGRESS NOTES
D: Patient h/o OHT on 6/14/18 post-op complicate by leukocytosis.  Admitted for neutropenic fever and oral mouth ulcer.  I/A: Had dialysis run yesterday, on tele SR, ad edith and independent.  At 2am the patient called the writer to report red streaks stool.  Writer noted red streaks stool and notified provider.  P: To continue to monitor and trend hemoglobin for now.

## 2018-08-05 NOTE — PROVIDER NOTIFICATION
Cards cross cover notified about pt having an episode of bloody streaked stool. Orders received for menthol zinc oxide cream.

## 2018-08-06 VITALS
TEMPERATURE: 97.9 F | DIASTOLIC BLOOD PRESSURE: 108 MMHG | BODY MASS INDEX: 25.21 KG/M2 | HEART RATE: 85 BPM | SYSTOLIC BLOOD PRESSURE: 157 MMHG | WEIGHT: 170.2 LBS | RESPIRATION RATE: 18 BRPM | OXYGEN SATURATION: 100 % | HEIGHT: 69 IN

## 2018-08-06 LAB
ANION GAP SERPL CALCULATED.3IONS-SCNC: 7 MMOL/L (ref 3–14)
BASOPHILS # BLD AUTO: 0 10E9/L (ref 0–0.2)
BASOPHILS NFR BLD AUTO: 0 %
BUN SERPL-MCNC: 53 MG/DL (ref 7–30)
CALCIUM SERPL-MCNC: 8.7 MG/DL (ref 8.5–10.1)
CHLORIDE SERPL-SCNC: 100 MMOL/L (ref 94–109)
CO2 SERPL-SCNC: 28 MMOL/L (ref 20–32)
CREAT SERPL-MCNC: 6.47 MG/DL (ref 0.66–1.25)
DIFFERENTIAL METHOD BLD: ABNORMAL
EOSINOPHIL # BLD AUTO: 0 10E9/L (ref 0–0.7)
EOSINOPHIL NFR BLD AUTO: 0 %
ERYTHROCYTE [DISTWIDTH] IN BLOOD BY AUTOMATED COUNT: 21.8 % (ref 10–15)
GFR SERPL CREATININE-BSD FRML MDRD: 9 ML/MIN/1.7M2
GLUCOSE BLDC GLUCOMTR-MCNC: 111 MG/DL (ref 70–99)
GLUCOSE BLDC GLUCOMTR-MCNC: 164 MG/DL (ref 70–99)
GLUCOSE BLDC GLUCOMTR-MCNC: 186 MG/DL (ref 70–99)
GLUCOSE BLDC GLUCOMTR-MCNC: 48 MG/DL (ref 70–99)
GLUCOSE BLDC GLUCOMTR-MCNC: 57 MG/DL (ref 70–99)
GLUCOSE BLDC GLUCOMTR-MCNC: 87 MG/DL (ref 70–99)
GLUCOSE BLDC GLUCOMTR-MCNC: 98 MG/DL (ref 70–99)
GLUCOSE SERPL-MCNC: 50 MG/DL (ref 70–99)
HCT VFR BLD AUTO: 27.7 % (ref 40–53)
HGB BLD-MCNC: 8.6 G/DL (ref 13.3–17.7)
IMM GRANULOCYTES # BLD: 0.2 10E9/L (ref 0–0.4)
IMM GRANULOCYTES NFR BLD: 4.2 %
LYMPHOCYTES # BLD AUTO: 0.3 10E9/L (ref 0.8–5.3)
LYMPHOCYTES NFR BLD AUTO: 8.3 %
MAGNESIUM SERPL-MCNC: 1.6 MG/DL (ref 1.6–2.3)
MCH RBC QN AUTO: 29.1 PG (ref 26.5–33)
MCHC RBC AUTO-ENTMCNC: 31 G/DL (ref 31.5–36.5)
MCV RBC AUTO: 94 FL (ref 78–100)
MONOCYTES # BLD AUTO: 0.3 10E9/L (ref 0–1.3)
MONOCYTES NFR BLD AUTO: 7.8 %
NEUTROPHILS # BLD AUTO: 3.1 10E9/L (ref 1.6–8.3)
NEUTROPHILS NFR BLD AUTO: 79.7 %
NRBC # BLD AUTO: 0.1 10*3/UL
NRBC BLD AUTO-RTO: 2 /100
PLATELET # BLD AUTO: 224 10E9/L (ref 150–450)
POTASSIUM SERPL-SCNC: 4.8 MMOL/L (ref 3.4–5.3)
RBC # BLD AUTO: 2.96 10E12/L (ref 4.4–5.9)
SODIUM SERPL-SCNC: 135 MMOL/L (ref 133–144)
TACROLIMUS BLD-MCNC: 9.4 UG/L (ref 5–15)
TME LAST DOSE: NORMAL H
WBC # BLD AUTO: 3.9 10E9/L (ref 4–11)

## 2018-08-06 PROCEDURE — 83735 ASSAY OF MAGNESIUM: CPT | Performed by: INTERNAL MEDICINE

## 2018-08-06 PROCEDURE — 80048 BASIC METABOLIC PNL TOTAL CA: CPT | Performed by: INTERNAL MEDICINE

## 2018-08-06 PROCEDURE — 25000132 ZZH RX MED GY IP 250 OP 250 PS 637: Mod: GY | Performed by: STUDENT IN AN ORGANIZED HEALTH CARE EDUCATION/TRAINING PROGRAM

## 2018-08-06 PROCEDURE — 25000132 ZZH RX MED GY IP 250 OP 250 PS 637: Mod: GY | Performed by: INTERNAL MEDICINE

## 2018-08-06 PROCEDURE — 00000146 ZZHCL STATISTIC GLUCOSE BY METER IP

## 2018-08-06 PROCEDURE — 25000132 ZZH RX MED GY IP 250 OP 250 PS 637: Mod: GY | Performed by: NURSE PRACTITIONER

## 2018-08-06 PROCEDURE — 36415 COLL VENOUS BLD VENIPUNCTURE: CPT | Performed by: INTERNAL MEDICINE

## 2018-08-06 PROCEDURE — 80197 ASSAY OF TACROLIMUS: CPT | Performed by: INTERNAL MEDICINE

## 2018-08-06 PROCEDURE — A9270 NON-COVERED ITEM OR SERVICE: HCPCS | Mod: GY | Performed by: NURSE PRACTITIONER

## 2018-08-06 PROCEDURE — 25000131 ZZH RX MED GY IP 250 OP 636 PS 637: Mod: GY | Performed by: INTERNAL MEDICINE

## 2018-08-06 PROCEDURE — A9270 NON-COVERED ITEM OR SERVICE: HCPCS | Mod: GY | Performed by: INTERNAL MEDICINE

## 2018-08-06 PROCEDURE — 99239 HOSP IP/OBS DSCHRG MGMT >30: CPT | Performed by: NURSE PRACTITIONER

## 2018-08-06 PROCEDURE — 85025 COMPLETE CBC W/AUTO DIFF WBC: CPT | Performed by: INTERNAL MEDICINE

## 2018-08-06 PROCEDURE — 25000128 H RX IP 250 OP 636: Performed by: STUDENT IN AN ORGANIZED HEALTH CARE EDUCATION/TRAINING PROGRAM

## 2018-08-06 PROCEDURE — A9270 NON-COVERED ITEM OR SERVICE: HCPCS | Mod: GY | Performed by: STUDENT IN AN ORGANIZED HEALTH CARE EDUCATION/TRAINING PROGRAM

## 2018-08-06 PROCEDURE — 25000131 ZZH RX MED GY IP 250 OP 636 PS 637: Mod: GY | Performed by: STUDENT IN AN ORGANIZED HEALTH CARE EDUCATION/TRAINING PROGRAM

## 2018-08-06 PROCEDURE — 25000125 ZZHC RX 250: Performed by: STUDENT IN AN ORGANIZED HEALTH CARE EDUCATION/TRAINING PROGRAM

## 2018-08-06 RX ORDER — HEPARIN SODIUM 1000 [USP'U]/ML
500 INJECTION, SOLUTION INTRAVENOUS; SUBCUTANEOUS
Status: CANCELLED | OUTPATIENT
Start: 2018-08-06 | End: 2018-08-06

## 2018-08-06 RX ORDER — HYDRALAZINE HYDROCHLORIDE 25 MG/1
50 TABLET, FILM COATED ORAL 4 TIMES DAILY
Qty: 270 TABLET | Refills: 1 | Status: ON HOLD | OUTPATIENT
Start: 2018-08-06 | End: 2018-08-13

## 2018-08-06 RX ORDER — HEPARIN SODIUM 1000 [USP'U]/ML
500 INJECTION, SOLUTION INTRAVENOUS; SUBCUTANEOUS CONTINUOUS
Status: CANCELLED | OUTPATIENT
Start: 2018-08-06

## 2018-08-06 RX ORDER — CLONIDINE HYDROCHLORIDE 0.1 MG/1
0.1 TABLET ORAL AT BEDTIME
Qty: 60 TABLET | Refills: 0 | Status: ON HOLD | OUTPATIENT
Start: 2018-08-06 | End: 2018-09-11

## 2018-08-06 RX ORDER — PREDNISONE 5 MG/1
15 TABLET ORAL 2 TIMES DAILY
Qty: 180 TABLET | Refills: 0 | Status: ON HOLD | OUTPATIENT
Start: 2018-08-06 | End: 2018-09-11

## 2018-08-06 RX ORDER — TACROLIMUS 1 MG/1
CAPSULE ORAL
Status: ON HOLD | COMMUNITY
Start: 2018-08-06 | End: 2018-09-11

## 2018-08-06 RX ORDER — MYCOPHENOLATE MOFETIL 250 MG/1
500 CAPSULE ORAL 2 TIMES DAILY
Qty: 180 CAPSULE | Refills: 11 | Status: ON HOLD | OUTPATIENT
Start: 2018-08-06 | End: 2018-09-11

## 2018-08-06 RX ORDER — HYDRALAZINE HYDROCHLORIDE 25 MG/1
75 TABLET, FILM COATED ORAL 3 TIMES DAILY
Qty: 270 TABLET | Refills: 1 | Status: SHIPPED | OUTPATIENT
Start: 2018-08-06 | End: 2018-08-06

## 2018-08-06 RX ORDER — CEFEPIME HYDROCHLORIDE 1 G/1
2 INJECTION, POWDER, FOR SOLUTION INTRAMUSCULAR; INTRAVENOUS
Qty: 4 G | Refills: 0 | Status: SHIPPED | OUTPATIENT
Start: 2018-08-06 | End: 2018-08-09

## 2018-08-06 RX ORDER — LISINOPRIL 20 MG/1
20 TABLET ORAL DAILY
Qty: 30 TABLET | Refills: 1 | Status: ON HOLD | OUTPATIENT
Start: 2018-08-07 | End: 2018-09-11

## 2018-08-06 RX ADMIN — PREDNISONE 15 MG: 5 TABLET ORAL at 08:32

## 2018-08-06 RX ADMIN — HYDRALAZINE HYDROCHLORIDE 75 MG: 50 TABLET ORAL at 13:29

## 2018-08-06 RX ADMIN — MYCOPHENOLATE MOFETIL 500 MG: 250 CAPSULE ORAL at 08:32

## 2018-08-06 RX ADMIN — ALBUTEROL SULFATE 6 PUFF: 90 AEROSOL, METERED RESPIRATORY (INHALATION) at 08:59

## 2018-08-06 RX ADMIN — CEFEPIME HYDROCHLORIDE 1 G: 1 INJECTION, POWDER, FOR SOLUTION INTRAMUSCULAR; INTRAVENOUS at 16:29

## 2018-08-06 RX ADMIN — NYSTATIN 1000000 UNITS: 100000 SUSPENSION ORAL at 12:17

## 2018-08-06 RX ADMIN — Medication 1 CAPSULE: at 08:32

## 2018-08-06 RX ADMIN — APIXABAN 2.5 MG: 2.5 TABLET, FILM COATED ORAL at 08:31

## 2018-08-06 RX ADMIN — PANTOPRAZOLE SODIUM 40 MG: 40 TABLET, DELAYED RELEASE ORAL at 08:32

## 2018-08-06 RX ADMIN — TACROLIMUS 2 MG: 1 CAPSULE ORAL at 17:50

## 2018-08-06 RX ADMIN — TACROLIMUS 1 MG: 1 CAPSULE ORAL at 08:32

## 2018-08-06 RX ADMIN — Medication 5 MG: at 08:31

## 2018-08-06 RX ADMIN — MYCOPHENOLATE MOFETIL 500 MG: 250 CAPSULE ORAL at 17:50

## 2018-08-06 RX ADMIN — NYSTATIN 1000000 UNITS: 100000 SUSPENSION ORAL at 08:55

## 2018-08-06 RX ADMIN — ROSUVASTATIN CALCIUM 20 MG: 10 TABLET, FILM COATED ORAL at 08:32

## 2018-08-06 RX ADMIN — PREDNISONE 15 MG: 5 TABLET ORAL at 17:50

## 2018-08-06 RX ADMIN — ACETAMINOPHEN 650 MG: 325 TABLET, FILM COATED ORAL at 09:11

## 2018-08-06 RX ADMIN — HYDRALAZINE HYDROCHLORIDE 50 MG: 50 TABLET ORAL at 08:31

## 2018-08-06 RX ADMIN — FLUTICASONE PROPIONATE 2 SPRAY: 50 SPRAY, METERED NASAL at 08:59

## 2018-08-06 RX ADMIN — NYSTATIN 1000000 UNITS: 100000 SUSPENSION ORAL at 16:29

## 2018-08-06 RX ADMIN — LISINOPRIL 20 MG: 20 TABLET ORAL at 08:31

## 2018-08-06 ASSESSMENT — ACTIVITIES OF DAILY LIVING (ADL)
ADLS_ACUITY_SCORE: 11

## 2018-08-06 NOTE — PROGRESS NOTES
D: Patient h/o OHT on 6/14/18 post-op complicate by leukocytosis.  Admitted for neutropenic fever and oral mouth ulcer.    I/A ESRD on dialysis TTS, on tele SR and mild HTN team aware.  Took ultram at HS for sleep.    P: To continue to monitor and possible discharge IV home infusion.
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
## 2018-08-06 NOTE — PLAN OF CARE
Problem: Patient Care Overview  Goal: Plan of Care/Patient Progress Review  Outcome: No Change  Pt A/O. NSR. RA. See flowsheets for VS. BPs running high this afternoon, Jennifer Cards 2 NP updated. Hydralazine increased. Pt endorsing HA + abd pain (Cards 2 NP aware) most of shift, given PRN Tylenol + heat packs. BG assessed, BG low this AM, see writer's previous notes. Pt ate breakfast + apple juice to help increase BG. Insulin regimen adjusted. Dialysis TTS, per report. Continue with plan of care and report changes to treatment team.

## 2018-08-06 NOTE — DISCHARGE SUMMARY
Dialysis Discharge Summary Brief    [facility]  Division of Nephrology  Nephrology Discharge Dialysis Orders  Ph: [phone]  Fax: [phone]    Murray Nicholson  MRN: [id number]  YOB: 1955    [facility] Dialysis Unit: [place]  Primary Nephrologist: Dr. Mcpherson    Date of Admission: 7/26/2018  Date of Discharge: 8/6/2018    I will fax primary team's discharge summary once complete. Murray last dialyzed Saturday 8/4. Please note changes to blood pressure medications and epogen dose and page me at  with any questions. Thanks much. Kristi Armas PA-C    Murray Nicholson is a 63 yr old male with PMH of HTN, DMII, CM s/p heart transplant (6/14/2018), ESRD, admitted with neutropenic fever and mouth ulcer concerning for HSV vs other virus, also GNR cultured from CVC 7/26. Found to have Pseudomonas aruginosa per mouth ulcer and CVC.      ESKD: due to DMII, dialyzes TTS schedule at [facility] under care of Dr Mcpherson. Access: tunneled RIJ (replaced 4/17/18). Run time: 4 hrs; EDW 76 kg. Is listed for transplant.  - Dialysis per TTS schedule   - Use low dose heparin on HD  - Heparin lock CVC       Volume: EDW 76 kg.      BP: elevated, meds being adjusted by cardiology (isosorbide dinitrate stopped due to concern for ongoing headache)  -- currently on hydralazine 50 mg TID, lisinopril 20 mg PO qday, clonidine 0.1 mg qday (has f/u with cardiology)        Anemia: hgb 7.9, s/p 1 unit PRBCs, recent labs with ferritin 876, IS 33, iron 74, hgb 7's; on epogen 7600 units per HD (recently increased). Hapto wnl, retic % 1.5, not appropriately elevated, parvo negative  - will increase epogen to 10,000 units per HD        S/p OHT: on 6/14/18, on tacrolimus and pred with transplant team and transplant ID adjusting doses in setting of elevated tacrolimus level and leukopenia/neutropenia and concern for infection. Tacro and MMF restarted, valcyte on hold.   - Heart bx without  evidence of rejection  - Cellcept 500 mg BID currently, increasing as tolerated  - Restarted tacrolimus 1 mg qAM/2 mg qHS (goal level was 10-12, will target 8 for now)  - Prednisone 15 mg bid  Prophylaxis:  - PCP: off Bactrim due to leukopenia, pentamidine 7/20/18, due 8/17/18  - Thrush: Nystatin   - PPI: pantoprazole 40 mg  - CAV: ASA 81 mg, Crestor 20 mg daily  - CMV (D+ / R-): Valcyte held      Pseudomonas aruginosa sepsis: cultured from mouth ulcer and CVC; neutropenic, now resolved with IS changes; blood cx drawn at outpt dialysis unit on 7/26 two out of two with Pseudomonas auruginosa; mouth ulcer neg for HCV, positive for Pseudomonas aruginosa; CMV, EBV, varicella negative; transplant ID following. On cefepime  -  Mouth ulcer positive for Pseudomonas aruginosa  - CVC cx drawn at outpt dialysis unit on 7/26, two out of two with Pseudomonas aruginosa   -  Per ID, ok to treat through line infection, cefepime 14 days course (through 8/9)         [x] Resume all previous dialysis orders with exception as noted below    New Orders (if not applicable put NA):  Estimated Dry Weight No change (76 kg)   Dialysis Duration    Dialysis Access    Antibiotics (dose per dialysis, end date) Cefepime 2 g last 30 minutes on 8/7 and 8/9           Labs to be drawn at dialysis Please re-culture CVC week of Aug 20th   Other major changes to dialysis prescription (e.g. Dialysate bath, heparin, blood flow rate, etc)      Medication changes (also fax the unit a copy of the discharge summary)         BP meds: hydralazine 50 mg TID, lisinopril 20 mg PO qday, clonidine 0.1 mg qday  -Epogen 10,000 units per HD     Name of provider completing this form: Kristi Armas PA-C

## 2018-08-06 NOTE — PLAN OF CARE
DISCHARGE   Discharged to: Home  Via: Automobile  Accompanied by: Family  Discharge Instructions: diet, activity, medications, follow up appointments, when to call the MD, and what to watchout for (i.e. s/s of infection, increasing SOB, palpitations, chest pain,)  Prescriptions: To be filled by [facility] per pt's request; medication list reviewed & sent with pt  Follow Up Appointments: arranged; information given  Belongings: All sent with pt  IV: out  Telemetry: off  Pt exhibits understanding of above discharge instructions; all questions answered.  Discharge Paperwork: faxed as needed

## 2018-08-06 NOTE — PROVIDER NOTIFICATION
Critical lab, BG 50. Pt BG rechecked at 48. Pt requesting to eat breakfast. Writer will recheck BG in ~10-15 mins.     Cards 2 MD notified.

## 2018-08-06 NOTE — PROGRESS NOTES
Pt BG up to 57, pt wanting apple juice and not gel. BG increased to 87 with this + breakfast. [employer] 2 NP updated. Will continue to closely monitor pt and BG.

## 2018-08-06 NOTE — PROGRESS NOTES
Care Coordinator Progress Note    Admission Date/Time:  7/26/2018  Attending MD:  Mavis Shearer MD  Data  Chart reviewed, discussed with interdisciplinary team.   Patient was admitted for:    Neutropenic fever (H)  Heart replaced by transplant (H)  Mouth sore  Personal history of tobacco use, presenting hazards to health  Diabetes mellitus (H).    Concerns with insurance coverage for discharge needs: No home IV abx insurance coverage.    Current Living Situation: Patient said that his two adult sons will be staying with him.   Support System: Supportive and Involved sons: Clara, also friend: Naresh Peck.   Services Involved: [facility] dialysis  Transportation at Discharge: Family or friend will provide  Transportation to Medical Appointments: family or friend will provide.    - Name of caregiver: Clara Nciholson  Barriers to Discharge: medical condition.     Coordination of Care and Referrals: Provided patient/family with options for resumption of outpt dialysis.    Assessment  Per NP, pt will need Cefepime IV with dialysis through 8/9/18.   Intervention:      Arrangements made with [facility] (Ph: [phone] Fax: [phone]) for resumption of outpt dialysis on T-Th-Sat at 11 AM and the administration of Cefepime 2 Gm IV on 8/7/18 and 8/9/18.     Plan  Anticipated Discharge Date: 8/6/18   Anticipated Discharge Plan:  Discharge to home with outpt f/u.     JONATHAN BULLARD RN BSN  Care Coordinator Unit   [phone]

## 2018-08-07 ENCOUNTER — CARE COORDINATION (OUTPATIENT)
Dept: CARE COORDINATION | Facility: CLINIC | Age: 63
End: 2018-08-07

## 2018-08-07 ENCOUNTER — TELEPHONE (OUTPATIENT)
Dept: TRANSPLANT | Facility: CLINIC | Age: 63
End: 2018-08-07

## 2018-08-07 ENCOUNTER — TELEPHONE (OUTPATIENT)
Dept: FAMILY MEDICINE | Facility: CLINIC | Age: 63
End: 2018-08-07

## 2018-08-07 ENCOUNTER — PATIENT OUTREACH (OUTPATIENT)
Dept: CARE COORDINATION | Facility: CLINIC | Age: 63
End: 2018-08-07

## 2018-08-07 ENCOUNTER — PRE VISIT (OUTPATIENT)
Dept: TRANSPLANT | Facility: CLINIC | Age: 63
End: 2018-08-07

## 2018-08-07 DIAGNOSIS — R50.81 NEUTROPENIC FEVER (H): ICD-10-CM

## 2018-08-07 DIAGNOSIS — Z94.1 HEART REPLACED BY TRANSPLANT (H): Primary | ICD-10-CM

## 2018-08-07 DIAGNOSIS — K13.79 MOUTH SORE: ICD-10-CM

## 2018-08-07 DIAGNOSIS — D70.9 NEUTROPENIC FEVER (H): ICD-10-CM

## 2018-08-07 RX ORDER — LIDOCAINE 40 MG/G
CREAM TOPICAL
Status: CANCELLED | OUTPATIENT
Start: 2018-08-07 | End: 2018-08-10

## 2018-08-07 NOTE — TELEPHONE ENCOUNTER
Please call for In Patient follow up  Chief Complaint: Heart Transplant Recipient (H), Neutropenic Fever (H)

## 2018-08-07 NOTE — PROGRESS NOTES
Clinic Care Coordination Contact  [facility]/Voicemail    Referral Source: IP Handoff  [facility] admission 7/26-8/6/2018  Neutropenic fever (H)  Heart replaced by transplant (H)  Mouth sore  Personal history of tobacco use, presenting hazards to health  Diabetes mellitus (H).    Clinical Data: Care Coordinator Outreach  Outreach attempted x 1.  Left message on voicemail with call back information and requested return call.  Plan: Care Coordinator will try to reach patient again in 1-2 business days.  Jaky Canela RN / Clinical Care Coordinator     [facility]   [email or web address]  Office :  [phone] / Fax :  [phone]

## 2018-08-07 NOTE — DISCHARGE SUMMARY
[facility]   Cardiology II Service / Advanced Heart Failure  Discharge Summary     Murray Nicholson MRN# [id number]   YOB: 1955 Age: 63 year old     DATE OF ADMISSION:  7/26/2018  DATE OF DISCHARGE:  8/6/2018  ADMITTING PROVIDER: Dr. Micha MD  DISCHARGE PROVIDER: Jennifer GAYLE/Dr. Austin  PRIMARY PROVIDER:   Yahir Turcios    ADMIT DIAGNOSES:   1. Pseudomonal bacteremia secondary to probable prececal colitis  2. Necrotic mouth ulcer    DISCHARGE DIAGNOSES:   1. Leukopenia resolved  2. S/p OHT  3. Donor 3 vessel CAD  4. HTN  5. ESRD on HD  6. RIJ Thrombus  7. DM Type II, Uncontrolled  8. GERD  9. Dyslipidemia  10. SHEELA    HPI: Please see the detailed H & P by Dr. Muse from 7/26/2018. Mr. Nicholson is a 63 year old male with ischemic / valvular cardiomyopathy now s/p OHTx (6/14/18), ESRD on HD (listed for kidney transplantation), HTN, dyslipidemia, and DM2.      Transplant Hospitalization:  - admitted 4/16/2018 for expediated workup for LVAD/heart/kidney transplant and was started on inotropes. He was listed for both heart/kidney transplant but ultimately underwent solo orthotopic heart transplant on 6/14/18 given heart donor had unsuitable kidneys  - Post-operatively: noted to have incidental pneumoperitoneum, and RIJ thrombus infected with CoNS and was started on Eliquis.  - Biospies have been negative for rejection; RHC showed normal cardiac output and filling pressures, echo with normal graft function  - Baseline angiogram shows donor coronary disease in all three coronaries arteries including 40% mLAD lesion and 80% OM lesion     As an outpatient in recent weeks, his WBC has been decreasing so prompting discontinuation of Bactrim, Valcyte and a decrease Cellcept. He has not had symptoms of infection and his CMV is negative (checking weekly).     Over the last 4-5 days, he notes daily fevers (up to 101 F) as well as a painful mouth ulcer on his upper hard palate. He can eat okay,  "but the last 1-2 days eating has become more problematic / painful. No painful swallowing. He does have nasal discharge and bifrontal headache as well. No neck stiffness. No visual changes. No nausea or vomiting or diarrhea. He does have some mild lightheadedness especially after dialysis earlier, and he actually had a light fall after his dialysis session. He did not hit his head. No chest pain, palpitations, LE edema, orthopnea, PND.     PHYSICAL EXAM:  Blood pressure (!) 157/108, pulse 85, temperature 97.9  F (36.6  C), temperature source Axillary, resp. rate 18, height 1.753 m (5' 9\"), weight 77.2 kg (170 lb 3.2 oz), SpO2 100 %.  GENERAL: Appears alert and oriented times three.   HEENT: Eye symmetrical and free of discharge bilaterally. Mucous membranes moist and without lesions.  NECK: Supple and without lymphadenopathy. JVD 8 cm.   CV: RRR, S1S2 present without murmur, rub, or gallop.   RESPIRATORY: Respirations regular, even, and unlabored. Lungs CTA throughout.   GI: Soft and non distended with normoactive bowel sounds present in all quadrants. No tenderness, rebound, guarding. No organomegaly.   EXTREMITIES: No peripheral edema. 2+ bilateral pedal pulses.   NEUROLOGIC: Alert and orientated x 3. CN II-XII grossly intact. No focal deficits.   MUSCULOSKELETAL: No joint swelling or tenderness.   SKIN: No jaundice. No rashes or lesions.     LABS:   Last CBC:   Recent Labs   Lab Test  08/06/18   0644   WBC  3.9*   RBC  2.96*   HGB  8.6*   HCT  27.7*   MCV  94   MCH  29.1   MCHC  31.0*   RDW  21.8*   PLT  224       Last CMP:  Recent Labs   Lab Test  08/06/18   0644   07/29/18   0523   NA  135   < >  130*   POTASSIUM  4.8   < >  5.0   CHLORIDE  100   < >  93*   TRINI  8.7   < >  8.1*   CO2  28   < >  29   BUN  53*   < >  40*   CR  6.47*   < >  4.11*   GLC  50*   < >  195*   AST   --    --   26   ALT   --    --   21   BILITOTAL   --    --   0.4   ALBUMIN   --    --   1.9*   PROTTOTAL   --    --   5.2*   ALKPHOS   --   "  --   106    < > = values in this interval not displayed.       IMAGING:  Results for orders placed or performed during the hospital encounter of 07/26/18   POC US ECHO LIMITED    Impression    Limited Bedside Cardiac Ultrasound, performed and interpreted by me.   Indication: Hypotension/shock.  Parasternal long axis, parasternal short axis, apical 4 chamber and subcostal views were acquired.   Image quality was satisfactory.    Findings:    Global left ventricular function appears intact.  Chambers do not appear dilated.  There is no evidence of free fluid within the pericardium.    IMPRESSION: Grossly normal left ventricular function and chamber size.  No pericardial effusion..   XR Chest 2 Views    Narrative    Exam:  XR CHEST 2 VW, 7/26/2018 6:12 PM    History: fever, s/p heart transplant;     Comparison:  Chest radiograph 7/20/2018.    Findings:  PA and lateral views chest. Tunneled right IJ central  venous catheter tip projects over the high right atrium. Median  sternotomy wires and mediastinal surgical clips compatible with heart  transportation. Cardiac silhouette is stable. No pleural effusion or  pneumothorax. No focal airspace opacities. Visualized upper abdomen is  unremarkable. Mild degenerative change in the thoracic spine.      Impression    Impression:    1. No acute airspace disease.  2. Stable postoperative changes of heart transplant.    I have personally reviewed the examination and initial interpretation  and I agree with the findings.    BAILEY JEFFERSON MD   CT Head w/o Contrast    Narrative    CT HEAD W/O CONTRAST 7/26/2018 8:34 PM    Provided History: eval sinus / head for possible abscess? headahces in  immunocompromised patient, heart transplant, ESRD;     Comparison: Head CT 4/16/2018.    Technique: Using multidetector thin collimation helical acquisition  technique, axial, coronal and sagittal CT images from the skull base  to the vertex were obtained without intravenous contrast.      Findings:    No intracranial hemorrhage, mass effect, midline shift, or abnormal  extra-axial fluid collection. Chronic small regions of  encephalomalacia in the left occipital lobe enhancement and the  anteroinferior right frontal lobe. No acute loss of gray-white  differentiation. Scattered subcortical and periventricular white  matter hypoattenuation is nonspecific, including new hypodensity in  the subcortical white matter of the left parietal lobe.    Calvarium and visualized facial bones are intact. Mild to moderate  mucosal thickening in the right maxillary sinus, similar to 4/16/2018.  Mastoid air cells are clear.      Impression    Impression:   1. No acute intracranial pathology.  2. No evidence of acute sinusitis.  3. No CT evidence of abscess.    I have personally reviewed the examination and initial interpretation  and I agree with the findings.    CULLEN CROW MD   CT Chest/Abdomen/Pelvis w Contrast    Narrative    EXAM: CT of the Chest, Abdomen and Pelvis, 7/26/2018    COMPARISON: CT CAP 6/26/2018    CLINICAL HISTORY: Evaluate for infectious source, abscess, post heart  transplant with low WBC     TECHNIQUE: Volumetric CT images of the chest, abdomen and pelvis were  obtained with intravenous contrast. Multiplanar reconstructions were  provided. Images were reviewed in bone, lung, and soft tissue windows.    Contrast: [id number]    Dose: 1101 mGy*cm    FINDINGS:    Chest:  Large bore tunneled right IJ central venous catheter tip terminates in  the mid right atrium. Postoperative changes of cardiac transplant  including median sternotomy wires and mediastinal surgical clips.  Anterior mediastinal soft tissue thickening with small loculated  retrosternal fluid inferiorly (series 4, image 188). Cardiac  silhouette is enlarged. No pericardial effusion. Aorta is normal in  caliber with normal aortic branching pattern. Normal caliber main  pulmonary artery. Scattered mediastinal and hilar lymph nodes  are not  enlarged by size criteria. Scattered coronary artery and aortic arch  calcifications.    The central tracheobronchial tree is patent. No pleural effusion or  pneumothorax. No consolidative pulmonary opacities. The lung apices  are collimated off the field-of-view. Unchanged 6 mm groundglass  nodule along the left major fissure (series 5, image 18) compared to  1/12/2018. No new or suspicious pulmonary nodules.    Abdomen and Pelvis:   The liver is unremarkable apart from a subcentimeter hypodensity near  the hepatic dome favored to represent a cyst. Gallbladder is  decompressed. Multiple cystic pancreatic lesions are unchanged  compared to 1/8/2018, including 12 mm cyst in the pancreatic head  (series 4, image 353), 9 mm cyst in the mid pancreatic body (series 4,  image 91), and a 1.6 cm cyst in the pancreatic body (series 4, image  274). Lesions are previously evaluated on prior MRIs, characterized as  IPMNs at that time. Spleen and adrenal glands are normal. Symmetric  renal parenchymal enhancement. Simple right renal cyst and additional  subcentimeter bilateral hypodensities which are too small to actually  characterize but favored represent cysts. No hydronephrosis or  hydroureter. No renal or ureteral fracture. Bladder is partially  distended and unremarkable. Prostate is enlarged.    No abnormally dilated bowel. No pneumatosis, portal venous gas, or  pneumoperitoneum. Colonic diverticulosis. Mild wall thickening and  pericolonic inflammation of the ascending colon just distal to the  cecum (series 4, image 399). No pericolonic fluid collections. The  appendix is normal. No free fluid. Major abdominal vasculature is  normal in caliber with scattered atherosclerotic calcifications of the  aorta and iliac arteries.    Bones and soft tissues:  No suspicious osseous lesions. Moderate multilevel degenerative change  in the thoracic spine. Moderate degenerative change in the hips.      Impression     Impression:   1. Findings suggestive of mild infectious or inflammatory colitis of  the ascending colon just proximal to the cecum. No pericolonic fluid  collection.  2. Postoperative changes of cardiac transplant with no significant  change in loculated retrosternal fluid and soft tissue stranding  compared to 6/26/2018.  3. 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    I have personally reviewed the examination and initial interpretation  and I agree with the findings.    BAILEY JEFFERSON MD     *Note: Due to a large number of results and/or encounters for the requested time period, some results have not been displayed. A complete set of results can be found in Results Review.     CONSULTATIONS:   1. Nephrology  2. Endocrinology     HOSPITAL COURSE:   Pseudomonal bacteremia secondary to probable mild prececal colitis with large necrotic mouth ulcer. Murray presented following 2/2 cultures per tunneled line positive per Eliseo for Pseudomonas, sensitive to Cefepime. Appreciate ID consult. Clarification prior to discharge with no need for tunneled line catheter exchange at this time. Oral ulceration positive for GNR as well. HSV PCR, VZV PCR, and CMV PCR negative. Repeat cultures negative to date. He will continue Pseudomonas to complete a 14 day course of 2 g on dialysis days through 8/9/18.    Leukopenia, resolved. Bactrim held throughout hospitalization with last Pentamidine dose 7/20/18. He will discharge on Cellcept 500 mg po BID. Valcyte remains on hold. CMV (D+ / R-), Recommend weekly CMV PCR while outpatient and off Valcyte. Up-titrating immunosuppressants carefully with assistance of pharmacy given recent complications.      NICM s/p OHT, Donor 3 vessel CAD. Underwent biopsy with no evidence of acute rejection, note no prior history of rejection. Serostatus: CMV: D+/R-. EBV: D+/R pending He will continue Cellcept 500 mg po BID, Prednisone 15 mg po  BID, and tacrolimus 1 mg in AM and 2 mg at HS. Tac goal level was 10-12, will target 8 for now due to infection. Continue Nystatin at discharge. Next dose of Pentamidine due 8/17/18. Continue ASA and Crestor.       HTN. Hypertensive throughout hospital stay, note transient HTN prior to admission as well with BP occasionally 150/90. He will discharge on Lisinopril 20 mg po daily, Hydralazine 50 mg po QID, and Clonidine 0.1 mg po at HS. Pt cleared for discharge per Dr. Austin with current BP.       RIJ Thrombus with CoNS. Continue Eliquis     ESRD on HD. Appreciate Nephrology consult. He will continue HD Tuesday, Thursday, and Saturday following discharge. Listed for renal transplant, EDW 76 kg.    DM Type II, Uncontrolled. Transient BG with hypoglycemia due to poor po intake. Appreciate Endocrinology consult. Will discharge him on NPH with close follow up scheduled for Wednesday. Recommended QID accuchecks.     DISCHARGE MEDICATIONS:  Discharge Medication List as of 8/6/2018  7:27 PM      START taking these medications    Details   ceFEPIme (MAXIPIME) 1 g SOLR vial Inject 2 g into the vein every 48 hours for 2 doses, Disp-4 g, R-0, Local Print      cloNIDine (CATAPRES) 0.1 MG tablet Take 1 tablet (0.1 mg) by mouth At Bedtime, Disp-60 tablet, R-0, E-Prescribe      lisinopril (PRINIVIL/ZESTRIL) 20 MG tablet Take 1 tablet (20 mg) by mouth daily, Disp-30 tablet, R-1, E-Prescribe         CONTINUE these medications which have CHANGED    Details   hydrALAZINE (APRESOLINE) 25 MG tablet Take 2 tablets (50 mg) by mouth 4 times daily Hold if top number BP less than 110., Disp-270 tablet, R-1, E-Prescribe      insulin isophane human (HUMULIN N PEN) 100 UNIT/ML injection 30 units in AM and 16 units in the evening. Please check blood sugar four times daily., Disp-3 mL, R-0, Historical      mycophenolate (GENERIC EQUIVALENT) 250 MG capsule Take 2 capsules (500 mg) by mouth 2 times daily, Disp-180 capsule, R-11, E-Prescribe       predniSONE (DELTASONE) 5 MG tablet Take 3 tablets (15 mg) by mouth 2 times daily, Disp-180 tablet, R-0, E-Prescribe      tacrolimus (GENERIC EQUIVALENT) 1 MG capsule 1 mg in AM and 2 mg at supper. Repeat level Wednesday., Historical         CONTINUE these medications which have NOT CHANGED    Details   acetaminophen (TYLENOL) 325 MG tablet Take 2 tablets (650 mg) by mouth every 4 hours as needed for mild pain (multimodal surgical pain management along with NSAIDS and opioid medication as indicated based on pain control and physical function.), Disp-100 tablet, R-0, Local Print      albuterol (PROAIR HFA/PROVENTIL HFA/VENTOLIN HFA) 108 (90 Base) MCG/ACT Inhaler Inhale 6 puffs into the lungs every 4 hours as needed for shortness of breath / dyspnea, Disp-1 Inhaler, R-0, Local Print      apixaban ANTICOAGULANT (ELIQUIS) 2.5 MG tablet Take 1 tablet (2.5 mg) by mouth 2 times daily, Disp-120 tablet, R-0, Local Print      ASPIRIN 81 MG OR TABS Take 1 tablet (81 mg) by mouth at bedtime, Historical      melatonin 1 MG TABS tablet Take 2 tablets (2 mg) by mouth nightly as needed for sleep, Disp-120 tablet, R-1, E-Prescribe      NEPHROCAPS 1 MG capsule Take 1 capsule by mouth daily, Disp-120 capsule, R-0, Local Print      pantoprazole (PROTONIX) 40 MG EC tablet Take 1 tablet (40 mg) by mouth every morning, Disp-30 tablet, R-11, E-Prescribe      pravastatin (PRAVACHOL) 40 MG tablet Take 1 tablet (40 mg) by mouth daily DC after current prescription completed; replace with rosuvastatin., Disp-90 tablet, R-0, E-Prescribe      traMADol (ULTRAM) 50 MG tablet Take 1 tablet (50 mg) by mouth every 6 hours as needed for moderate pain, Disp-10 tablet, R-0, Local Print      biotin (BIOTIN 5000) 5 MG CAPS Take 5 mg by mouth daily, Disp-30 capsule, R-3, E-Prescribe      blood glucose monitoring (ACCU-CHEK FASTCLIX) lancets Use to test blood sugar 2-3 times daily or as directed.  Ok to substitute alternative if insurance prefers., Disp-102  each, R-11, E-Prescribe      blood glucose monitoring (NO BRAND SPECIFIED) test strip Use to test blood sugar 2-3 times daily or as directed., Disp-100 each, R-11, E-Prescribe      fluticasone (FLONASE) 50 MCG/ACT spray Spray 1-2 sprays into both nostrils daily, Disp-16 g, R-11, E-Prescribe      loratadine (CLARITIN) 10 MG tablet Take 10 mg by mouth daily as needed Reported on 5/3/2017, Historical      nystatin (MYCOSTATIN) 974692 UNIT/ML suspension Swish and swallow 10 mLs (1,000,000 Units) in mouth 4 times dailyDisp-400 mL, R-2Local Print      order for DME Equipment being ordered: [employer] ()  Treatment Diagnosis: ESRD on PD  Pt has to be connected to PD all night and can not be disconnected, hence impending his mobility to go to the bathroom. At risk for infection if he does not have this equipment.D isp-1 Units, R-0, Local Print      rosuvastatin (CRESTOR) 20 MG tablet Take 1 tablet (20 mg) by mouth daily, Disp-30 tablet, R-1, E-PrescribeReplaces Pravastatin with next fill.      triamcinolone (KENALOG) 0.1 % ointment Apply topically 2 times dailyDisp-80 g, [id number]         STOP taking these medications       isosorbide dinitrate (ISORDIL) 10 MG tablet Comments:   Reason for Stopping:         tacrolimus (GENERIC EQUIVALENT) 0.5 MG capsule Comments:   Reason for Stopping:               DISCHARGE DISPOSITION: Murray Nicholson will discharge to home in stable condition.     DISCHARGE INSTRUCTIONS:    Discharge Procedure Orders  Medication Therapy Management Referral   Referral Type: Med Therapy Management     Follow Up and recommended labs and tests   Order Comments: ______________________________________________________    [facility]   Phone: [phone]   Fax: [phone]    For resumption of outpatient dialysis on T-Th-Sat at 11 AM and the administration of Cefepime 2 Gm IV on 8/7/18 and 8/9/18.    _______________________________________________________     Reason for your hospital stay    Order Comments: You were admitted to the hospital for an infection in your blood. Please notify your cardiologist for recurrent fever, chills, and worsening headaches.     Adult [facility] Follow-up and recommended labs and tests   Order Comments: Follow up with [facility] with Endocrinology Wednesday at 9:00 AM.   Repeat CBC with diff, CMP, and Tac level Wednesday.   Follow up with Cardiology as scheduled 8/10/18 for RHC and appointment with Ramya Suh NP.   Present to HD on Tuesday as scheduled.     Appointments on [place] and/or [facility] (with [facility] or [facility] provider or service). Call [phone] if you haven't heard regarding these appointments within 7 days of discharge.     Monitor and record   Order Comments: Blood sugar four times daily     Full Code     Diet   Order Comments: Follow this diet upon discharge: -Low potassium diet   Order Specific Question Answer Comments   Is discharge order? Yes          45 minutes spent in discharge, including >50% in counseling and coordination of care, medication review and plan of care recommended on follow up. Please do not hesitate to contact me should there be questions regarding the hospital course or discharge plan.      Jennifer Dean FNP-C  8/7/2018  [phone]    I personally instructed patient in the discharge plan.  25 minutes

## 2018-08-07 NOTE — TELEPHONE ENCOUNTER
Called to check on pt post hospitalization, stated he was doing ok, med card up to date. BP this /88. Requested pt to have nonfasting labs with prograf level tomorrow 8/8 AM, hold Eliquis on Thursday and Friday AM for heart biopsy.     Requested to bring VS journal and med card to clinic on Friday.

## 2018-08-07 NOTE — LETTER
Health Care Home - Access Care Plan    About Me  Patient Name:  Murray Mcneil    YOB: 1955  Age:                             63 year old   Hu MRN:            [id number] Telephone Information:     Home Phone [phone]   Mobile Not on file.       Address:    [address] [place] [address] Email address:  [email or web address]      Emergency Contact(s)  Name Relationship Lgl Grd Work Phone Home Phone Mobile Phone   1. JOAQUIM MCNEIL Son No  [phone]   2. TYLER COMBS Friend No  [phone]   3. LEMUEL MCNEIL Son No  [phone]             Health Maintenance: Routine Health maintenance Reviewed: Not assessed    My Access Plan  Medical Emergency 911   Questions or concerns during clinic hours Primary Clinic Line, I will call the clinic directly: [facility] - [phone]   24 Hour Appointment Line [phone] or  [phone] [place] [phone]) (toll free)   24 Hour Nurse Line [phone] (toll free)   Questions or concerns outside clinic hours 24 Hour Appointment Line, I will call the after-hours on-call line:   [facility] [phone] or [phone]) (toll-free)   Preferred Urgent Care     Preferred Hospital     Preferred Pharmacy [facility]     Behavioral Health Crisis Line The National Suicide Prevention Lifeline at 1-552.742.9455 or 911     My Care Team Members  Patient Care Team       Relationship Specialty Notifications Start End    Yahir Turcios MD PCP - General Family Practice  2/19/18     Phone: [phone] Fax: [phone]         [address] [facility] [address]    Arcelia Blum MD MD Cardiology  5/4/15     Phone: [phone] Fax: [phone]         [address] [facility] [address] [facility] [address]    Bobby Mcconnell MD MD Surgery  6/29/15     Phone: [phone] Fax: [phone]         [address] [place] [address]  [address]    Amrita Tobin, RN Nurse Coordinator Thoracic Surgery (Cardiothoracic Vascular Surgery) Admissions 4/4/18     Phone: [phone] Pager: [phone]        Kristi Armas PA Physician Assistant Physician Assistant  5/1/18     Phone: [phone] Fax: [phone] [address] [facility] [address]    [facility] Care Coordinator   7/3/18     Ana Luisa Mcpherson MD MD Nephrology  7/11/18     Phone: [phone] Pager: [phone] Fax: [phone]        [address] [facility] [address] [facility] [address]    Lillie Ulloa, TONY Transplant Coordinator  All results, Admissions 7/30/18     Phone: [phone] Pager: [phone]               My Medical and Care Information  Problem List   Patient Active Problem List   Diagnosis     Esophageal reflux     Hypersomnia with sleep apnea     Allergic rhinitis     CKD (chronic kidney disease) stage 5, GFR less than 15 ml/min (H)     Hyperlipidemia LDL goal <100     Hypertension goal BP (blood pressure) < 130/80     Hypertensive cardiopathy     Anemia of chronic disease     Anemia in chronic renal disease     Hypertension     Dyslipidemia     MGUS (monoclonal gammopathy of unknown significance)     Ascending aortic aneurysm (H)     Type 2 diabetes mellitus with diabetic chronic kidney disease (H)     Anemia, iron deficiency     Anemia in stage 5 chronic kidney disease (H)     Heart transplanted (H)     Leukopenia     Heart transplant recipient (H)     Fever     Bacteremia due to Pseudomonas      Current Medications and Allergies:  See printed Medication Report

## 2018-08-07 NOTE — LETTER
[place] CARE COORDINATION    August 14, 2018    Murray Nicholson  [address]  [place] [address]      Dear Murray,    I am a clinic care coordinator who works with Yahir Turcios MD at [facility] . I wanted to introduce myself and provide you with my contact information for you to be able to call me with any questions or concerns. I wanted to introduce myself and provide you with my contact information so that you can call me with questions or concerns about your health care. Below is a description of clinic care coordination and how I can further assist you.     The clinic care coordinator is a registered nurse  who understand the health care system. The goal of clinic care coordination is to help you manage your health and improve access to the [facility] in the most efficient manner. The registered nurse can assist you in meeting your health care goals by providing education, coordinating services, and strengthening the communication among your providers.   Please feel free to contact me at [phone], with any questions or concerns. We at [facility] are focused on providing you with the highest-quality healthcare experience possible and that all starts with you.     Sincerely,     Jaky Canela RN / Clinical Care Coordinator     [facility]   [email or web address]  Office :  [phone] / Fax :  [phone]      Enclosed: I have enclosed a copy of a 24 Hour Access Plan. This has helpful phone numbers for you to call when needed. Please keep this in an easy to access place to use as needed.

## 2018-08-07 NOTE — TELEPHONE ENCOUNTER
See care coordination encounter.  This encounter closed     Jaky Canela RN / Clinical Care Coordinator     [facility]   [email or web address]  Office :  [phone] / Fax :  [phone]

## 2018-08-08 DIAGNOSIS — M1A.0390 CHRONIC GOUT OF WRIST, UNSPECIFIED CAUSE, UNSPECIFIED LATERALITY: ICD-10-CM

## 2018-08-08 DIAGNOSIS — Z94.1 HEART REPLACED BY TRANSPLANT (H): ICD-10-CM

## 2018-08-08 DIAGNOSIS — M10.9 GOUT, UNSPECIFIED CAUSE, UNSPECIFIED CHRONICITY, UNSPECIFIED SITE: ICD-10-CM

## 2018-08-08 DIAGNOSIS — E87.6 HYPOKALEMIA: ICD-10-CM

## 2018-08-08 LAB
ALP SERPL-CCNC: 156 U/L (ref 40–150)
ALT SERPL W P-5'-P-CCNC: 22 U/L (ref 0–70)
ANION GAP SERPL CALCULATED.3IONS-SCNC: 10 MMOL/L (ref 3–14)
ANISOCYTOSIS BLD QL SMEAR: ABNORMAL
AST SERPL W P-5'-P-CCNC: 34 U/L (ref 0–45)
BASOPHILS # BLD AUTO: 0 10E9/L (ref 0–0.2)
BASOPHILS NFR BLD AUTO: 0 %
BILIRUB SERPL-MCNC: 0.4 MG/DL (ref 0.2–1.3)
BUN SERPL-MCNC: 38 MG/DL (ref 7–30)
BURR CELLS BLD QL SMEAR: ABNORMAL
CALCIUM SERPL-MCNC: 8.3 MG/DL (ref 8.5–10.1)
CHLORIDE SERPL-SCNC: 99 MMOL/L (ref 94–109)
CO2 SERPL-SCNC: 22 MMOL/L (ref 20–32)
CREAT SERPL-MCNC: 5.01 MG/DL (ref 0.66–1.25)
DIFFERENTIAL METHOD BLD: ABNORMAL
EOSINOPHIL # BLD AUTO: 0 10E9/L (ref 0–0.7)
EOSINOPHIL NFR BLD AUTO: 0 %
ERYTHROCYTE [DISTWIDTH] IN BLOOD BY AUTOMATED COUNT: 21.7 % (ref 10–15)
GFR SERPL CREATININE-BSD FRML MDRD: 12 ML/MIN/1.7M2
GLUCOSE SERPL-MCNC: 134 MG/DL (ref 70–99)
HCT VFR BLD AUTO: 27.2 % (ref 40–53)
HGB BLD-MCNC: 8.5 G/DL (ref 13.3–17.7)
LYMPHOCYTES # BLD AUTO: 0.1 10E9/L (ref 0.8–5.3)
LYMPHOCYTES NFR BLD AUTO: 4.4 %
MAGNESIUM SERPL-MCNC: 1.7 MG/DL (ref 1.6–2.3)
MCH RBC QN AUTO: 29.4 PG (ref 26.5–33)
MCHC RBC AUTO-ENTMCNC: 31.3 G/DL (ref 31.5–36.5)
MCV RBC AUTO: 94 FL (ref 78–100)
METAMYELOCYTES # BLD: 0.1 10E9/L
METAMYELOCYTES NFR BLD MANUAL: 4.4 %
MONOCYTES # BLD AUTO: 0.4 10E9/L (ref 0–1.3)
MONOCYTES NFR BLD AUTO: 11.5 %
MYELOCYTES # BLD: 0.1 10E9/L
MYELOCYTES NFR BLD MANUAL: 1.8 %
NEUTROPHILS # BLD AUTO: 2.5 10E9/L (ref 1.6–8.3)
NEUTROPHILS NFR BLD AUTO: 77.9 %
NRBC # BLD AUTO: 0 10*3/UL
NRBC BLD AUTO-RTO: 1 /100
PHOSPHATE SERPL-MCNC: 3.1 MG/DL (ref 2.5–4.5)
PLATELET # BLD AUTO: 178 10E9/L (ref 150–450)
PLATELET # BLD EST: ABNORMAL 10*3/UL
POIKILOCYTOSIS BLD QL SMEAR: ABNORMAL
POLYCHROMASIA BLD QL SMEAR: SLIGHT
POTASSIUM SERPL-SCNC: 5.1 MMOL/L (ref 3.4–5.3)
RBC # BLD AUTO: 2.89 10E12/L (ref 4.4–5.9)
SODIUM SERPL-SCNC: 132 MMOL/L (ref 133–144)
TACROLIMUS BLD-MCNC: 9.4 UG/L (ref 5–15)
TME LAST DOSE: NORMAL H
URATE SERPL-MCNC: 4.4 MG/DL (ref 3.5–7.2)
WBC # BLD AUTO: 3.2 10E9/L (ref 4–11)

## 2018-08-08 PROCEDURE — 80197 ASSAY OF TACROLIMUS: CPT | Performed by: INTERNAL MEDICINE

## 2018-08-09 ENCOUNTER — TELEPHONE (OUTPATIENT)
Dept: TRANSPLANT | Facility: CLINIC | Age: 63
End: 2018-08-09

## 2018-08-09 DIAGNOSIS — Z20.828 EXPOSURE TO VIRAL DISEASE: ICD-10-CM

## 2018-08-09 DIAGNOSIS — Z94.1 HEART REPLACED BY TRANSPLANT (H): Primary | ICD-10-CM

## 2018-08-09 DIAGNOSIS — Z20.828 CONTACT WITH OR EXPOSURE TO VIRAL DISEASE: ICD-10-CM

## 2018-08-09 DIAGNOSIS — Z11.59 ENCOUNTER FOR SCREENING FOR OTHER VIRAL DISEASES (CODE): Primary | ICD-10-CM

## 2018-08-09 NOTE — PROGRESS NOTES
Clinic Care Coordination Contact  [facility]/Voicemail    Referral Source: IP Handoff    Clinical Data: Care Coordinator Outreach  Outreach attempted x 2.  Outreach made on the phone number listed for work and home .  Male answered and states the patient is no longer working there.  CC left a message on Jasper/son's VM requesting and updated number on the patient   Plan: Care Coordinator will await the sons return call with an updated contact number       Jaky Canela RN / Clinical Care Coordinator     [facility]   [email or web address]  Office :  [phone] / Fax :  [phone]

## 2018-08-10 ENCOUNTER — HOSPITAL ENCOUNTER (OUTPATIENT)
Facility: CLINIC | Age: 63
Setting detail: SPECIMEN
Discharge: HOME | DRG: 329 | End: 2018-08-10
Admitting: NURSE PRACTITIONER
Payer: MEDICARE

## 2018-08-10 ENCOUNTER — OFFICE VISIT (OUTPATIENT)
Dept: CARDIOLOGY | Facility: CLINIC | Age: 63
DRG: 329 | End: 2018-08-10
Attending: NURSE PRACTITIONER
Payer: MEDICARE

## 2018-08-10 ENCOUNTER — APPOINTMENT (OUTPATIENT)
Dept: CARDIOLOGY | Facility: CLINIC | Age: 63
DRG: 329 | End: 2018-08-10
Attending: INTERNAL MEDICINE
Payer: MEDICARE

## 2018-08-10 ENCOUNTER — APPOINTMENT (OUTPATIENT)
Dept: MEDSURG UNIT | Facility: CLINIC | Age: 63
DRG: 329 | End: 2018-08-10
Attending: NURSE PRACTITIONER
Payer: MEDICARE

## 2018-08-10 ENCOUNTER — HOSPITAL ENCOUNTER (OUTPATIENT)
Facility: CLINIC | Age: 63
Discharge: HOME | DRG: 329 | End: 2018-08-10
Attending: INTERNAL MEDICINE | Admitting: INTERNAL MEDICINE
Payer: MEDICARE

## 2018-08-10 VITALS
BODY MASS INDEX: 24.73 KG/M2 | HEIGHT: 69 IN | DIASTOLIC BLOOD PRESSURE: 88 MMHG | SYSTOLIC BLOOD PRESSURE: 146 MMHG | WEIGHT: 167 LBS | OXYGEN SATURATION: 100 % | HEART RATE: 85 BPM

## 2018-08-10 VITALS
DIASTOLIC BLOOD PRESSURE: 107 MMHG | WEIGHT: 167 LBS | OXYGEN SATURATION: 100 % | TEMPERATURE: 98.4 F | HEIGHT: 69 IN | RESPIRATION RATE: 16 BRPM | SYSTOLIC BLOOD PRESSURE: 165 MMHG | BODY MASS INDEX: 24.73 KG/M2

## 2018-08-10 DIAGNOSIS — N18.5 CKD (CHRONIC KIDNEY DISEASE) STAGE 5, GFR LESS THAN 15 ML/MIN (H): ICD-10-CM

## 2018-08-10 DIAGNOSIS — Z94.1 HEART REPLACED BY TRANSPLANT (H): ICD-10-CM

## 2018-08-10 DIAGNOSIS — N18.5 TYPE 2 DIABETES MELLITUS WITH STAGE 5 CHRONIC KIDNEY DISEASE NOT ON CHRONIC DIALYSIS, WITHOUT LONG-TERM CURRENT USE OF INSULIN (H): ICD-10-CM

## 2018-08-10 DIAGNOSIS — D72.819 LEUKOPENIA, UNSPECIFIED TYPE: ICD-10-CM

## 2018-08-10 DIAGNOSIS — Z94.1 HEART REPLACED BY TRANSPLANT (H): Primary | ICD-10-CM

## 2018-08-10 DIAGNOSIS — I71.21 ASCENDING AORTIC ANEURYSM (H): ICD-10-CM

## 2018-08-10 DIAGNOSIS — R50.9 FEVER: ICD-10-CM

## 2018-08-10 DIAGNOSIS — R50.81 FEVER IN OTHER DISEASES: ICD-10-CM

## 2018-08-10 DIAGNOSIS — I10 ESSENTIAL HYPERTENSION: ICD-10-CM

## 2018-08-10 DIAGNOSIS — E11.22 TYPE 2 DIABETES MELLITUS WITH STAGE 5 CHRONIC KIDNEY DISEASE NOT ON CHRONIC DIALYSIS, WITHOUT LONG-TERM CURRENT USE OF INSULIN (H): ICD-10-CM

## 2018-08-10 LAB
ANION GAP SERPL CALCULATED.3IONS-SCNC: 9 MMOL/L (ref 3–14)
ANISOCYTOSIS BLD QL SMEAR: ABNORMAL
BASOPHILS # BLD AUTO: 0 10E9/L (ref 0–0.2)
BASOPHILS NFR BLD AUTO: 0 %
BUN SERPL-MCNC: 32 MG/DL (ref 7–30)
CALCIUM SERPL-MCNC: 7.7 MG/DL (ref 8.5–10.1)
CHLORIDE SERPL-SCNC: 102 MMOL/L (ref 94–109)
CO2 SERPL-SCNC: 24 MMOL/L (ref 20–32)
CREAT SERPL-MCNC: 4.87 MG/DL (ref 0.66–1.25)
DIFFERENTIAL METHOD BLD: ABNORMAL
EOSINOPHIL # BLD AUTO: 0 10E9/L (ref 0–0.7)
EOSINOPHIL NFR BLD AUTO: 0 %
ERYTHROCYTE [DISTWIDTH] IN BLOOD BY AUTOMATED COUNT: 21.6 % (ref 10–15)
GFR SERPL CREATININE-BSD FRML MDRD: 12 ML/MIN/1.7M2
GLUCOSE BLDC GLUCOMTR-MCNC: 205 MG/DL (ref 70–99)
GLUCOSE SERPL-MCNC: 220 MG/DL (ref 70–99)
HCT VFR BLD AUTO: 25.2 % (ref 40–53)
HGB BLD-MCNC: 8 G/DL (ref 13.3–17.7)
LYMPHOCYTES # BLD AUTO: 0.1 10E9/L (ref 0.8–5.3)
LYMPHOCYTES NFR BLD AUTO: 2.6 %
MACROCYTES BLD QL SMEAR: PRESENT
MAGNESIUM SERPL-MCNC: 1.6 MG/DL (ref 1.6–2.3)
MCH RBC QN AUTO: 29.7 PG (ref 26.5–33)
MCHC RBC AUTO-ENTMCNC: 31.7 G/DL (ref 31.5–36.5)
MCV RBC AUTO: 94 FL (ref 78–100)
METAMYELOCYTES # BLD: 0.3 10E9/L
METAMYELOCYTES NFR BLD MANUAL: 7.8 %
MONOCYTES # BLD AUTO: 0.3 10E9/L (ref 0–1.3)
MONOCYTES NFR BLD AUTO: 8.7 %
MYCOBACTERIUM SPEC CULT: NORMAL
MYCOBACTERIUM SPEC CULT: NORMAL
MYELOCYTES # BLD: 0.1 10E9/L
MYELOCYTES NFR BLD MANUAL: 2.6 %
NEUTROPHILS # BLD AUTO: 2.8 10E9/L (ref 1.6–8.3)
NEUTROPHILS NFR BLD AUTO: 78.3 %
NRBC # BLD AUTO: 0.1 10*3/UL
NRBC BLD AUTO-RTO: 2 /100
PHOSPHATE SERPL-MCNC: 2.8 MG/DL (ref 2.5–4.5)
PLATELET # BLD AUTO: 174 10E9/L (ref 150–450)
PLATELET # BLD EST: ABNORMAL 10*3/UL
POIKILOCYTOSIS BLD QL SMEAR: SLIGHT
POTASSIUM SERPL-SCNC: 4.8 MMOL/L (ref 3.4–5.3)
RBC # BLD AUTO: 2.69 10E12/L (ref 4.4–5.9)
SODIUM SERPL-SCNC: 135 MMOL/L (ref 133–144)
SPECIMEN SOURCE: NORMAL
WBC # BLD AUTO: 3.6 10E9/L (ref 4–11)

## 2018-08-10 PROCEDURE — 27210982 ZZH KIT RT HC TOTES DISP CR7

## 2018-08-10 PROCEDURE — 40000166 ZZH STATISTIC PP CARE STAGE 1

## 2018-08-10 PROCEDURE — 83735 ASSAY OF MAGNESIUM: CPT | Performed by: NURSE PRACTITIONER

## 2018-08-10 PROCEDURE — 80048 BASIC METABOLIC PNL TOTAL CA: CPT | Performed by: NURSE PRACTITIONER

## 2018-08-10 PROCEDURE — 27211047 ZZH FORCEP BIOPSY CR10

## 2018-08-10 PROCEDURE — 88350 IMFLUOR EA ADDL 1ANTB STN PX: CPT | Performed by: INTERNAL MEDICINE

## 2018-08-10 PROCEDURE — 93505 ENDOMYOCARDIAL BIOPSY: CPT | Mod: 26 | Performed by: INTERNAL MEDICINE

## 2018-08-10 PROCEDURE — 25000125 ZZHC RX 250: Performed by: INTERNAL MEDICINE

## 2018-08-10 PROCEDURE — 88307 TISSUE EXAM BY PATHOLOGIST: CPT | Performed by: INTERNAL MEDICINE

## 2018-08-10 PROCEDURE — 85025 COMPLETE CBC W/AUTO DIFF WBC: CPT | Performed by: NURSE PRACTITIONER

## 2018-08-10 PROCEDURE — 87040 BLOOD CULTURE FOR BACTERIA: CPT | Performed by: NURSE PRACTITIONER

## 2018-08-10 PROCEDURE — 82962 GLUCOSE BLOOD TEST: CPT

## 2018-08-10 PROCEDURE — 88346 IMFLUOR 1ST 1ANTB STAIN PX: CPT | Performed by: INTERNAL MEDICINE

## 2018-08-10 PROCEDURE — 36415 COLL VENOUS BLD VENIPUNCTURE: CPT | Performed by: NURSE PRACTITIONER

## 2018-08-10 PROCEDURE — C1893 INTRO/SHEATH, FIXED,NON-PEEL: HCPCS

## 2018-08-10 PROCEDURE — 99214 OFFICE O/P EST MOD 30 MIN: CPT | Mod: ZP | Performed by: NURSE PRACTITIONER

## 2018-08-10 PROCEDURE — 84100 ASSAY OF PHOSPHORUS: CPT | Performed by: NURSE PRACTITIONER

## 2018-08-10 PROCEDURE — 93505 ENDOMYOCARDIAL BIOPSY: CPT

## 2018-08-10 RX ORDER — LIDOCAINE 40 MG/G
CREAM TOPICAL
Status: COMPLETED | OUTPATIENT
Start: 2018-08-10 | End: 2018-08-10

## 2018-08-10 RX ADMIN — LIDOCAINE: 40 CREAM TOPICAL at 11:30

## 2018-08-10 NOTE — DISCHARGE INSTRUCTIONS
[facility]                        Interventional Cardiology  Discharge Instructions   Post Right Heart Cath      AFTER YOU GO HOME:    DO drink plenty of fluids    DO resume your regular diet and medications unless otherwise instructed by your Primary Physician    Do Not scrub the procedure site vigorously    No lotion or powder to the puncture site for 3 days    CALL YOUR PRIMARY PHYSICIAN IF: You may resume all normal activity.  Monitor neck site for bleeding, swelling, or voice changes. If you notice bleeding or swelling immediately apply pressure to the site and call number below to speak with Cardiology Fellow.  If you experience any changes in your breathing you should call your doctor immediately or come to the closest Emergency Department.  Do not drive yourself.    ADDITIONAL INSTRUCTIONS: Medications: You are to resume all home medications including anticoagulation therapy unless otherwise advised by your primary cardiologist or nurse coordinator.    Follow Up: Per your primary cardiology team    If you have any questions or concerns regarding your procedure site please call [phone] at anytime and ask for Cardiology Fellow on call.  They are available 24 hours a day.  You may also contact the Cardiology Clinic after hours number at [phone].                                                       Telephone Numbers [phone] Monday-Friday 8:00 am to 4:30 pm    [phone] After 4:30 pm Monday-Friday, Weekends & Holidays  Ask for Interventional Cardiologist on call. Someone is on call 24 hours/day   [facility] toll free number [phone] Monday-Friday 8:00 am to 4:30 pm   [facility] Emergency Dept [phone]

## 2018-08-10 NOTE — PROGRESS NOTES
D: Pt arrived in cath lab for right heart catheterization and endomyocardial tissue biopsy  I: Right heart catheterization and endomyocardial biopsy completed per MD.  7fr sheath removed from LIJ site, manual pressure applied until hemostasis achieved.  A: LIJsite clean, dry and intact. Soft, no hematoma.  P: Pt to transfer to  for discharge.  Pt educated on watching neck site for bleeding, hematoma, pressure on airway and voice changes.

## 2018-08-10 NOTE — MR AVS SNAPSHOT
After Visit Summary   8/10/2018    Murray Nicholson    MRN: [id number]           Patient Information     Date Of Birth          1955        Visit Information        Provider Department      8/10/2018 2:30 PM Mellisa Suh, APRN CNP [facility] Heart Care        Today's Diagnoses     Fever    -  1      Care Instructions    Please call your coordinator at [phone] with any questions or concerns.      Coordinator will call with biopsy results.    Call if you have ANY fever greater than 99, low BP or decline in overall health.    Coordinator will call with date/time of Pentamidine treatment.     Return 8/24/18 for next biopsy.     Coordinator will contact dialysis center about continuing antibiotics.     Cultures today.                    Follow-ups after your visit        Your next 10 appointments already scheduled     Aug 17, 2018  9:00 AM CDT   (Arrive by 8:45 AM)   RETURN DIABETES with Virginia Jacome MD   [facility] Endocrinology ([facility])    [address] [facility] [address] [phone]            Aug 17, 2018  9:30 AM CDT   (Arrive by 9:15 AM)   RETURN WAIT LIST with Janine Trujillo PA-C   [facility] Solid Organ Transplant ([facility])    [address]  [address]  [facility] [address] [phone]            Aug 17, 2018 10:30 AM CDT   (Arrive by 10:15 AM)   Office Visit with Eduardo Lea [facility] Medication Therapy Management ([facility])    [address]  [address] [facility] [address] [phone]           Bring a current list of meds and any records pertaining to this visit. For Physicals, please bring immunization records and any forms needing to be filled out. Please arrive 10 minutes early to complete paperwork.            Aug 17, 2018 11:30 AM CDT   US AORTA/IVC/ILIAC DUPLEX COMPLETE with UCUSV2   [facility] Imaging Center US (  [facility] [place])    [address] [place] [address] [phone]           Please bring a list of your medicines (including vitamins, minerals and over-the-counter drugs). Also, tell your doctor about any allergies you may have. Wear comfortable clothes and leave your valuables at home.  Adults: No eating or drinking for 8 hours before the exam. You may take medicine with a small sip of water.  Children: - Infants, breast-fed: may have breast milk up to 2 hours before exam. - Infants, formula: may have bottle until 4 hours before exam. - Children 1-5 years: No food or drink for 4 hours before exam. - Children 6 -12 years: No food or drink for 6 hours before exam. - Children over 12 years: No food or drink for 8 hours before exam. - J Tube Fed: No need to stop feedings.  Please call the Imaging Department at your exam site with any questions.            Aug 24, 2018  9:00 AM CDT   LAB with UU LAB GOLD WAITING   [facility] ([facility])    [address] [address]              Please do not eat 10-12 hours before your appointment if you are coming in fasting for labs on lipids, cholesterol, or glucose (sugar). This does not apply to pregnant women. Water, hot tea and black coffee (with nothing added) are okay. Do not drink other fluids, diet soda or chew gum.            Aug 24, 2018  9:30 AM CDT   Procedure - 2.5 hour with U2A ROOM 17   Unit 2A [facility] [place] ([facility])    [address] [address]               Aug 24, 2018 10:30 AM CDT   Heart Cath Heart Biopsy with UUHCVR3   [facility] ([facility])    [address] [address]   [phone]            Aug 24, 2018  1:30 PM CDT   (Arrive by 1:15 PM)   RETURN HEART TRANSPLANT with VERONICA Rocha [facility]  Heart Care ([facility] [place])    [address]  [address]  [place] [facility] [address] [phone]            Aug 24, 2018  2:00 PM CDT   Infusion 60 with UC SPEC INFUSION, UC 50 ATC   [facility] Advanced [facility] [place] Specialty and Procedure ([facility])    [address]  [address]  [place] [facility] [address] [phone]              Future tests that were ordered for you today     Open Future Orders        Priority Expected Expires Ordered    Blood culture Routine 8/10/2018 9/9/2018 8/10/2018    Blood culture Routine 8/10/2018 9/9/2018 8/10/2018            Who to contact     If you have questions or need follow up information about today's clinic visit or your schedule please contact [facility] directly at [phone].  Normal or non-critical lab and imaging results will be communicated to you by [employer], letter or phone within 4 business days after the clinic has received the results. If you do not hear from us within 7 days, please contact the clinic through [employer] or phone. If you have a critical or abnormal lab result, we will notify you by phone as soon as possible.  Submit refill requests through [employer] or call your pharmacy and they will forward the refill request to us. Please allow 3 business days for your refill to be completed.          Additional Information About Your Visit        [employer] Information     [employer] gives you secure access to your electronic health record. If you see a primary care provider, you can also send messages to your care team and make appointments. If you have questions, please call your primary care clinic.  If you do not have a primary care provider, please call [phone] and they will assist you.        Care EveryWhere ID     This is your Care EveryWhere ID. This could be used by other organizations to access your [facility] medical records  [id number]        Your Vitals Were     Pulse Height Pulse  "Oximetry BMI (Body Mass Index)          85 1.753 m (5' 9\") 100% 24.66 kg/m2         Blood Pressure from Last 3 Encounters:   08/10/18 146/88   08/10/18 (!) 165/107   08/06/18 (!) 157/108    Weight from Last 3 Encounters:   08/10/18 75.8 kg (167 lb)   08/10/18 75.8 kg (167 lb)   08/06/18 77.2 kg (170 lb 3.2 oz)              We Performed the Following     UA with Microscopic [ULJ0993]     Urine Culture Aerobic Bacterial [SVT082]          Today's Medication Changes          These changes are accurate as of 8/10/18  3:11 PM.  If you have any questions, ask your nurse or doctor.               These medicines have changed or have updated prescriptions.        Dose/Directions    hydrALAZINE 25 MG tablet   Commonly known as:  APRESOLINE   This may have changed:    - how much to take  - when to take this  - additional instructions   Used for:  Essential hypertension, Heart transplanted (H)        Dose:  50 mg   Take 2 tablets (50 mg) by mouth 4 times daily Hold if top number BP less than 110.   Quantity:  270 tablet   Refills:  1                Primary Care Provider Office Phone # Fax #    Yahir Alex Turcios [phone]       [address] [place] [address]        Goals        Lifestyle    Increase physical activity     Notes - Note created  7/3/2018  1:51 PM by Carrie Tran RN    Goal Statement: I will start cardiac rehab  Measure of Success: starts cardiac rehab  Supportive Steps to Achieve: support of RN CC  Barriers: none  Strengths: willingness to participate   Date to Achieve By: 7-15-18          Equal Access to Services     [facility]: Hadii marsha hernandez Sotracey, waaxda luqadaha, qaybta kaalmajose peña. So [facility] [phone].    ATENCIÓN: Si habla español, tiene a ortiz disposición servicios gratuitos de asistencia lingüística. Llame al [phone].    We comply with applicable federal civil rights laws and [place] laws. We do not discriminate " on the basis of race, color, national origin, age, disability, sex, sexual orientation, or gender identity.            Thank you!     Thank you for choosing [facility]  for your care. Our goal is always to provide you with excellent care. Hearing back from our patients is one way we can continue to improve our services. Please take a few minutes to complete the written survey that you may receive in the mail after your visit with us. Thank you!             Your Updated Medication List - Protect others around you: Learn how to safely use, store and throw away your medicines at www.disposemymeds.org.          This list is accurate as of 8/10/18  3:11 PM.  Always use your most recent med list.                   Brand Name Dispense Instructions for use Diagnosis    acetaminophen 325 MG tablet    TYLENOL    100 tablet    Take 2 tablets (650 mg) by mouth every 4 hours as needed for mild pain (multimodal surgical pain management along with NSAIDS and opioid medication as indicated based on pain control and physical function.)    Acute post-operative pain       albuterol 108 (90 Base) MCG/ACT inhaler    PROAIR HFA/PROVENTIL HFA/VENTOLIN HFA    1 Inhaler    Inhale 6 puffs into the lungs every 4 hours as needed for shortness of breath / dyspnea    Dyspnea on exertion       apixaban ANTICOAGULANT 2.5 MG tablet    ELIQUIS    120 tablet    Take 1 tablet (2.5 mg) by mouth 2 times daily    Heart transplanted (H)       aspirin 81 MG tablet      Take 1 tablet (81 mg) by mouth at bedtime        biotin 5 MG Caps    BIOTIN 5000    30 capsule    Take 5 mg by mouth daily    Brittle nails       blood glucose monitoring lancets     102 each    Use to test blood sugar 2-3 times daily or as directed.  Ok to substitute alternative if insurance prefers.    Type 2 diabetes mellitus with stage 5 chronic kidney disease not on chronic dialysis, without long-term current use of insulin (H)       blood glucose monitoring test strip    no  brand specified    100 each    Use to test blood sugar 2-3 times daily or as directed.    Type 2 diabetes mellitus with stage 5 chronic kidney disease not on chronic dialysis, without long-term current use of insulin (H)       cloNIDine 0.1 MG tablet    CATAPRES    60 tablet    Take 1 tablet (0.1 mg) by mouth At Bedtime    Heart transplant recipient (H)       fluticasone 50 MCG/ACT spray    FLONASE    16 g    Spray 1-2 sprays into both nostrils daily    Nasal congestion       hydrALAZINE 25 MG tablet    APRESOLINE    270 tablet    Take 2 tablets (50 mg) by mouth 4 times daily Hold if top number BP less than 110.    Essential hypertension, Heart transplanted (H)       insulin isophane human 100 UNIT/ML injection    HumuLIN N PEN    3 mL    30 units in AM and 16 units in the evening. Please check blood sugar four times daily.    Type 2 diabetes mellitus with stage 5 chronic kidney disease not on chronic dialysis, without long-term current use of insulin (H)       lisinopril 20 MG tablet    PRINIVIL/ZESTRIL    30 tablet    Take 1 tablet (20 mg) by mouth daily    Heart transplant recipient (H)       loratadine 10 MG tablet    CLARITIN     Take 10 mg by mouth daily as needed Reported on 5/3/2017    Hypertensive cardiopathy, SOB (shortness of breath)       melatonin 1 MG Tabs tablet     120 tablet    Take 2 tablets (2 mg) by mouth nightly as needed for sleep    Heart transplanted (H)       mycophenolate 250 MG capsule    GENERIC EQUIVALENT    180 capsule    Take 2 capsules (500 mg) by mouth 2 times daily    Heart transplanted (H)       NEPHROCAPS 1 MG capsule     120 capsule    Take 1 capsule by mouth daily        nystatin 039107 UNIT/ML suspension    MYCOSTATIN    400 mL    Swish and swallow 10 mLs (1,000,000 Units) in mouth 4 times daily    Heart transplant recipient (H)       order for DME     1 Units    Equipment being ordered: Commode () Treatment Diagnosis: ESRD on PD Pt has to be connected to PD all night and  can not be disconnected, hence impending his mobility to go to the bathroom. At risk for infection if he does not have this equipment.    CKD (chronic kidney disease) stage 5, GFR less than 15 ml/min (H)       pantoprazole 40 MG EC tablet    PROTONIX    30 tablet    Take 1 tablet (40 mg) by mouth every morning    Heart transplant recipient (H)       pravastatin 40 MG tablet    PRAVACHOL    90 tablet    Take 1 tablet (40 mg) by mouth daily DC after current prescription completed; replace with rosuvastatin.    Dyslipidemia       predniSONE 5 MG tablet    DELTASONE    180 tablet    Take 3 tablets (15 mg) by mouth 2 times daily    Heart transplant recipient (H)       rosuvastatin 20 MG tablet    CRESTOR    30 tablet    Take 1 tablet (20 mg) by mouth daily    Heart transplant recipient (H)       tacrolimus 1 MG capsule    GENERIC EQUIVALENT     1 mg in AM and 2 mg at supper. Repeat level Wednesday.        traMADol 50 MG tablet    ULTRAM    10 tablet    Take 1 tablet (50 mg) by mouth every 6 hours as needed for moderate pain    Acute post-operative pain       triamcinolone 0.1 % ointment    KENALOG    80 g    Apply topically 2 times daily    Xerosis of skin

## 2018-08-10 NOTE — PROGRESS NOTES
1145  Prep is complete for Right heart Bx.  Glucose at 1148 is 205.  Pt did take 30 units of NPH this AM.  Denies any pain.  CTRN

## 2018-08-10 NOTE — LETTER
8/10/2018      RE: Murray Nicholson  [address]  [place] [address]       Dear Colleague,    Thank you for the opportunity to participate in the care of your patient, Murray Nicholson, at the [facility] at [facility]. Please see a copy of my visit note below.    [facility]    HPI:   Mr. Nicholson is a 63 year old male s/p recent orthotopic heart transplant who presents to clinic today for hospital follow-up. Patient has history of systolic heart failure 2/2 NICM, CKD on HD, HTN, dyslipidemia, DM2. He was admitted 4/16/2018 for expediated workup for LVAD/heart/kidney transplant and was started on inotropes. He was listed for both heart/kidney transplant but ultimately underwent solo orthotopic heart transplant on 6/14/18. He was noted to have moderately dilated ascending aortic aneurysm introaop. Post-op course complicated by leukocytosis - started on empiric bx (no source of infection found), incidental pneumoperitoneum, and RIJ thrombus infected with CoNS and was started on Eliquis. He remains on HD listed for renal Tx. Biospies have been negative for rejection. RHC showed normal cardiac output and filling pressures, echo with normal graft function. Baseline angiogram shows donor coronary disease in all three coronaries arteries including 40% mLAD lesion and 80% OM lesion. He had leukopenia early post transplant, we decreased Cellcept then d.c'ed Bactrim and Valcyte. He was ultimately admitted for neutropenic fever on 7/26. He was also noted to have necrotic mouth ulcer. Blood cultures grew +for pseudomonas arguinosa - source felt to be prececal colitis. Cultures of mouth ulcer were negative for viral process but grew pseudomonas. He was treated with cefepime for 14 days (2g 3x weekly after HD).  Patient also had elevated BPs while inpatient so meds adjusted, ISDN was d/c'ed due to reports of HA. He had a negative head CT during admission. He  "was d/c'ed  with two doses of cefepime left.     Since hospital dc patient reports feeling well. He has no specific complaints today except he hesitantly admits to a fever of 100.6 last night and feeling hot around that time. This morning's temperatures were 99.1 and 98.3. He denies chills and night sweats. He had dose of cefepime at [facility] yesterday (last dose). Mouth ulcer has improved, starting to \"slough off\". BP at home at 130s/80s mostly. HRs 80s. He states HA has improved since stopping ISDN. Patient denies nausea, vomiting, or diarrhea. Patient denies new oral lesions, rashes, lightheadedness, dizziness, chest pain, palpitations, LE edema, orthopnea, PND. Denies bleeding issues on apixaban. He continues to decline starting cardiac rehab for now - feels too busy with other appts.    PAST MEDICAL HISTORY:  Past Medical History:   Diagnosis Date     (HFpEF) heart failure with preserved ejection fraction (H)      Allergic rhinitis, cause unspecified      Anemia of chronic kidney failure      AS (aortic stenosis)      Ascending aortic aneurysm (H)      Bicuspid aortic valve      CAD (coronary artery disease)      Chronic kidney disease, stage 5 (H)      Congestive heart failure, unspecified      Dyslipidemia      Esophageal reflux      ESRD (end stage renal disease) (H)      Hypersomnia with sleep apnea, unspecified      Hypertension      MGUS (monoclonal gammopathy of unknown significance)      Mitral regurgitation      SHELEA (obstructive sleep apnea)      Systolic heart failure (H)      Type 2 diabetes mellitus (H)        FAMILY HISTORY:  Family History   Problem Relation Age of Onset     C.A.D. Father       from-never knew father-age 60     Diabetes Father      Cerebrovascular Disease Father      Hypertension No family hx of      Breast Cancer No family hx of      Cancer - colorectal No family hx of      Prostate Cancer No family hx of      KIDNEY DISEASE No family hx of      Melanoma No family hx of      " Skin Cancer No family hx of        SOCIAL HISTORY:  , not currently working    CURRENT MEDICATIONS:    Current Outpatient Prescriptions on File Prior to Visit:  acetaminophen (TYLENOL) 325 MG tablet Take 2 tablets (650 mg) by mouth every 4 hours as needed for mild pain (multimodal surgical pain management along with NSAIDS and opioid medication as indicated based on pain control and physical function.)   albuterol (PROAIR HFA/PROVENTIL HFA/VENTOLIN HFA) 108 (90 Base) MCG/ACT Inhaler Inhale 6 puffs into the lungs every 4 hours as needed for shortness of breath / dyspnea   apixaban ANTICOAGULANT (ELIQUIS) 2.5 MG tablet Take 1 tablet (2.5 mg) by mouth 2 times daily   ASPIRIN 81 MG OR TABS Take 1 tablet (81 mg) by mouth at bedtime   biotin (BIOTIN 5000) 5 MG CAPS Take 5 mg by mouth daily   blood glucose monitoring (ACCU-CHEK FASTCLIX) lancets Use to test blood sugar 2-3 times daily or as directed.  Ok to substitute alternative if insurance prefers.   blood glucose monitoring (NO BRAND SPECIFIED) test strip Use to test blood sugar 2-3 times daily or as directed.   [] ceFEPIme (MAXIPIME) 1 g SOLR vial Inject 2 g into the vein every 48 hours for 2 doses   cloNIDine (CATAPRES) 0.1 MG tablet Take 1 tablet (0.1 mg) by mouth At Bedtime   fluticasone (FLONASE) 50 MCG/ACT spray Spray 1-2 sprays into both nostrils daily   hydrALAZINE (APRESOLINE) 25 MG tablet Take 2 tablets (50 mg) by mouth 4 times daily Hold if top number BP less than 110. (Patient taking differently: Take 75 mg by mouth 3 times daily Hold if top number BP less than 110.)   insulin isophane human (HUMULIN N PEN) 100 UNIT/ML injection 30 units in AM and 16 units in the evening. Please check blood sugar four times daily.   lisinopril (PRINIVIL/ZESTRIL) 20 MG tablet Take 1 tablet (20 mg) by mouth daily   loratadine (CLARITIN) 10 MG tablet Take 10 mg by mouth daily as needed Reported on 5/3/2017   melatonin 1 MG TABS tablet Take 2 tablets (2 mg) by  mouth nightly as needed for sleep   mycophenolate (GENERIC EQUIVALENT) 250 MG capsule Take 2 capsules (500 mg) by mouth 2 times daily   NEPHROCAPS 1 MG capsule Take 1 capsule by mouth daily   nystatin (MYCOSTATIN) 119256 UNIT/ML suspension Swish and swallow 10 mLs (1,000,000 Units) in mouth 4 times daily   order for DME Equipment being ordered: Carol ()Treatment Diagnosis: ESRD on PDPt has to be connected to PD all night and can not be disconnected, hence impending his mobility to go to the bathroom. At risk for infection if he does not have this equipment. (Patient not taking: Reported on 7/26/2018)   pantoprazole (PROTONIX) 40 MG EC tablet Take 1 tablet (40 mg) by mouth every morning   pravastatin (PRAVACHOL) 40 MG tablet Take 1 tablet (40 mg) by mouth daily DC after current prescription completed; replace with rosuvastatin.   predniSONE (DELTASONE) 5 MG tablet Take 3 tablets (15 mg) by mouth 2 times daily   rosuvastatin (CRESTOR) 20 MG tablet Take 1 tablet (20 mg) by mouth daily   tacrolimus (GENERIC EQUIVALENT) 1 MG capsule 1 mg in AM and 2 mg at supper. Repeat level Wednesday.   traMADol (ULTRAM) 50 MG tablet Take 1 tablet (50 mg) by mouth every 6 hours as needed for moderate pain   triamcinolone (KENALOG) 0.1 % ointment Apply topically 2 times daily     Current Facility-Administered Medications on File Prior to Visit:  [COMPLETED] lidocaine (LMX4) cream   [DISCONTINUED] HOLD: apixaban (ELIQUIS) 24 hours pre-procedure       ROS:  CONSTITUTIONAL: See HPI  HEENT: Headache improved   CV: See HPI  PULMONARY: See HPI  GI: See HPI  : Denies urinary alterations, dysuria, urinary frequency, hematuria, and abnormal drainage.   EXT: Denies lower extremity edema or color changes.   SKIN: Denies abnormal rashes or lesions.   MUSCULOSKELETAL: Denies upper or lower extremity weakness and pain.   NEUROLOGIC: Denies lightheadedness, dizziness, seizures, or upper or lower extremity paresthesia.     EXAM:  /88 (BP  "Location: Left arm, Patient Position: Chair, Cuff Size: Adult Regular)  Pulse 85  Ht 1.753 m (5' 9\")  Wt 75.8 kg (167 lb)  SpO2 100%  BMI 24.66 kg/m2 Temp 98.3.     GENERAL: Appears comfortable, in no acute distress.   HEENT: Eye symmetrical, no discharge or icterus bilaterally. Mucous membranes moist and without lesions. No thrush.   CV: RRR, +S1S2, no murmur, rub, or gallop. JVP just above clavicle at  45 degrees.   RESPIRATORY: Respirations regular, even, and unlabored. Lungs CTA throughout.   GI: Soft and non distended with normoactive bowel sounds present in all quadrants. No tenderness, rebound, guarding. No hepatomegaly.   EXTREMITIES: No peripheral edema. 2+ bilateral pedal pulses.   NEUROLOGIC: Alert and oriented x 3. No focal deficits.   MUSCULOSKELETAL: No joint swelling or tenderness.   SKIN: No jaundice. No rashes or lesions.     Labs - reviewed with patient in clinic today:  CBC RESULTS:  Lab Results   Component Value Date    WBC 3.6 (L) 08/10/2018    RBC 2.69 (L) 08/10/2018    HGB 8.0 (L) 08/10/2018    HCT 25.2 (L) 08/10/2018    MCV 94 08/10/2018    MCH 29.7 08/10/2018    MCHC 31.7 08/10/2018    RDW 21.6 (H) 08/10/2018     08/10/2018       CMP RESULTS:  Lab Results   Component Value Date     08/10/2018    POTASSIUM 4.8 08/10/2018    CHLORIDE 102 08/10/2018    CO2 24 08/10/2018    ANIONGAP 9 08/10/2018     (H) 08/10/2018    BUN 32 (H) 08/10/2018    CR 4.87 (H) 08/10/2018    GFRESTIMATED 12 (L) 08/10/2018    GFRESTBLACK 15 (L) 08/10/2018    TRINI 7.7 (L) 08/10/2018    BILITOTAL 0.4 08/08/2018    ALBUMIN 1.9 (L) 07/29/2018    ALKPHOS 156 (H) 08/08/2018    ALT 22 08/08/2018    AST 34 08/08/2018        INR RESULTS:  Lab Results   Component Value Date    INR 1.24 (H) 07/26/2018       LIPID RESULTS:  Lab Results   Component Value Date    CHOL 175 07/18/2018    HDL 92 07/18/2018    LDL 66 07/18/2018    TRIG 88 07/18/2018    CHOLHDLRATIO 5.0 08/10/2015       IMMUNOSUPPRESSANT " LEVELS:  Lab Results   Component Value Date    TACROL 9.4 08/08/2018    DOSTAC 2000 8/7/18 08/08/2018       No components found for: CK  Lab Results   Component Value Date    MAG 1.6 08/10/2018     Lab Results   Component Value Date    A1C 7.0 (H) 07/18/2018     Lab Results   Component Value Date    PHOS 2.8 08/10/2018     Lab Results   Component Value Date    NTBNP 01835 (H) 11/08/2016     Lab Results   Component Value Date    SAITESTMET [facility] 07/18/2018    SAICELL Class I 07/18/2018    UP4GHILGE None 07/18/2018    [id number] None 07/18/2018    SAIREPCOM  07/18/2018      Test performed by modified procedure. Serum heat inactivated and tested   by a modified ([place] Smet) protocol including fetal calf serum addition.   High-risk, mfi >3,000. Mod-risk, mfi 500-3,000.       Lab Results   Component Value Date    SAIITESTME [facility] 07/18/2018    SAIICELL Class II 07/18/2018    [id number] None 07/18/2018    [id number] None 07/18/2018    SAIIREPCOM  07/18/2018      Test performed by modified procedure. Serum heat inactivated and tested   by a modified ([place] Smet) protocol including fetal calf serum addition.   High-risk, mfi >3,000. Mod-risk, mfi 500-3,000.       Lab Results   Component Value Date    CSPEC Plasma, EDTA anticoagulant 08/08/2018       Diagnostic Studies:  TTE 7/20/18  Global and regional left ventricular function is hyperkinetic with an EF >70%.  Mild-moderate left ventricular hypertrophy.  Right ventricle with normal size and systolic function with mild hypertrophy.  No significant valve disease and no change in LVEF.    Cor angiogram 7/18/18      RHC 7/18/18        Assessment/Plan:  Mr. Nicholson is a 63 year old male who is s/p orthotopic heart transplant on 6/14/18 who presents to clinic for hospital follow-up of pseudomonas bacteremia and necrotic mouth ulcer. Post-op course was complicated leukocytosis for which he received abx, RIJ thrombus infected with CoNS, an incidental pneumoperitoneum. We had ongoing issues  with leukopenia early on so MMF decreased and Valcyte and Bactrim stopped. Baseline angiogram showed diffuse donor CAD. He has no DSAs but repeat biopsies with weakly positive c4d (negative c3d). Graft function has been normal. Today, he presents feeling well without any specific complaints except low grade fever last night. He had just finished IV abx course for pseudomonas. He does not feel feverish or fatigued, BP is wnl, and WBC 3.6. We will repeat blood cultures and extended his course of abx - then touch base with ID on Monday. D/w patient - recommend very low threshold to present to ED over the weekend for recurrent fevers, low blood pressures, N/V, feeling unwell - patient agrees. Plan d/w Dr Blum, inpatient attending.     # Status post OHT on 6/14/18, history of NICM  # Donor 3 vessel CAD  # Chronic immunosuppression  * Rejection history: none.   * Recent immunosuppression changes: decreased MMF dt leukopenia   * Last biopsy: 8/2/18 0R, weak-moderately positive c4d negative c3d  * Intolerance to medications: none    Immunosuppression:  - Steroids: prednisone per taper   - CNI: Tacrolimus. Trough level goal ~8  - Antiproliferative: mycophenolate 500 mg bid    Prophylaxis:  - PCP: off Bactrim due to leukopenia, pentamidine 7/20/18 and every 28 days due 8/17  - Thrush: Nystatin   - PPI: pantoprazole 40 mg  - CAV: ASA 81 mg and change pravastatin to Crestor 20 mg when runs out  - CMV high risk D+ R- : Valcyte d/c'ed due to leukopenia, weekly CMV PCR  - Continue calcium and vitamin D    # Pseudomonas bacteremia concerned for undertreatment given fevers overnight, repeat blood cultures today (including dialysis access) and arrange for another dose of cefepime tomorrow at HD (renally dosed 2 g three times weekly).    # Mouth ulcer, improving sloughing off per patient, no areas of necrosis on exam    # Leukopenia improving he is off Valcyte, checking weekly CMV PCR and off Bactrim    # HTN  # Ascending aortic  dilation  -hydralazine 75 mg tid, lisinopril 20 mg daily, clonidine 0.1 mg at hs, ISDN stopped due to HA    # RIJ Thrombus with CoNS  -continue Eliquis, hold for bxs  -s/p Vanco course     # ESRD listed for renal transplant, on HD TTS, EDW 76 kg      25 minutes spent face-to-face with patient, >50% in counseling and/or coordination of care as described above      Ramya Suh, FRANK, NP-C  8/10/2018

## 2018-08-10 NOTE — PROCEDURES
FINAL CATHETERIZATION REPORT:     PROCEDURES PERFORMED:   Endomyocardial Biopsy    PHYSICIANS:  Attending Physician: Montrell Posada MD  Interventional Cardiology Fellow: Miah Mack M.D.  Cardiology Fellow: Brice Mariscal M.D.    INDICATION:  Murray Nicholson is a 63 year old male w/ h/o OHT 6/2018 currently listed for renal transplant here for routine cardiac biopsy. The indication for the procedure was routine post-heart transplant care      DESCRIPTION:  1. Consent obtained with discussion of risks.  All questions were answered.  2. Sterile prep and procedure.  3. Location with Sheaths:   Lf IJ  7 Fr 25 cm [long]  4. Access: Local anesthetic with lidocaine.  A standard 18 guage needle with ultrasound guidance was used to establish vascular access using a modified Seldinger technique.  5. Diagnostic Catheters: 7 Fr Daig, double curve  6. Estimated blood loss: < 5 ml    MEDICATIONS:    Heart rate, BP, respiration, oxygen saturation and patient responses were monitored throughout the procedure with the assistance of the RN under my supervision.      Procedures:    ENDOMYOCARDIAL BIOPSY:  1. Successful collection of 5 right ventricular endomyocardial biopsy samples using the Jawz.      Sheath Removal:  The catheter sheath was manually removed in the cardiac catheterization laboratory.    Contrast: Isovue, 0 ml     Fluoroscopy Time: 5.5 min    COMPLICATIONS:  1. None    SUMMARY:   Cardiac biopsies with 5 samples collected and sent to pathology.    PLAN:     >> Bedrest per protocol.  >> Continued medical management and lifestyle modification for cardiovascular risk factor optimization.   >>. Discharge today per protocol    The attending interventional cardiologist was present and supervised all critical aspects the procedure.    See CVIS report for final draft.    Brice Mariscal M.D.  Cardiology Fellow    Montrell Posada M.D.  Cardiology Staff

## 2018-08-10 NOTE — PATIENT INSTRUCTIONS
Please call your coordinator at [phone] with any questions or concerns.      Coordinator will call with biopsy results.    Call if you have ANY fever greater than 99, low BP or decline in overall health.    Coordinator will call with date/time of Pentamidine treatment.     Return 8/24/18 for next biopsy.     Coordinator will contact dialysis center about continuing antibiotics.     Cultures today.

## 2018-08-10 NOTE — IP AVS SNAPSHOT
MRN:[id number]                      After Visit Summary   8/10/2018    Murray Nicholson    MRN: [id number]           Visit Information        Department      8/10/2018 11:02 AM [address] [facility] [place]          Review of your medicines      UNREVIEWED medicines. Ask your doctor about these medicines        Dose / Directions    acetaminophen 325 MG tablet   Commonly known as:  TYLENOL   Used for:  Acute post-operative pain        Dose:  650 mg   Take 2 tablets (650 mg) by mouth every 4 hours as needed for mild pain (multimodal surgical pain management along with NSAIDS and opioid medication as indicated based on pain control and physical function.)   Quantity:  100 tablet   Refills:  0       albuterol 108 (90 Base) MCG/ACT inhaler   Commonly known as:  PROAIR HFA/PROVENTIL HFA/VENTOLIN HFA   Used for:  Dyspnea on exertion        Dose:  6 puff   Inhale 6 puffs into the lungs every 4 hours as needed for shortness of breath / dyspnea   Quantity:  1 Inhaler   Refills:  0       apixaban ANTICOAGULANT 2.5 MG tablet   Commonly known as:  ELIQUIS   Used for:  Heart transplanted (H)        Dose:  2.5 mg   Take 1 tablet (2.5 mg) by mouth 2 times daily   Quantity:  120 tablet   Refills:  0       aspirin 81 MG tablet        Take 1 tablet (81 mg) by mouth at bedtime   Refills:  0       biotin 5 MG Caps   Commonly known as:  BIOTIN 5000   Used for:  Brittle nails        Dose:  5 mg   Take 5 mg by mouth daily   Quantity:  30 capsule   Refills:  3       ceFEPIme 1 g Solr vial   Commonly known as:  MAXIPIME   Indication:  Bacteria in the Blood   Used for:  Heart transplant recipient (H), Sepsis due to other etiology (H)   Ask about: Should I take this medication?        Dose:  2 g   Inject 2 g into the vein every 48 hours for 2 doses   Quantity:  4 g   Refills:  0       cloNIDine 0.1 MG tablet   Commonly known as:  CATAPRES   Used for:  Heart transplant recipient (H)        Dose:  0.1 mg   Take 1 tablet (0.1 mg) by mouth  At Bedtime   Quantity:  60 tablet   Refills:  0       fluticasone 50 MCG/ACT spray   Commonly known as:  FLONASE   Used for:  Nasal congestion        Dose:  1-2 spray   Spray 1-2 sprays into both nostrils daily   Quantity:  16 g   Refills:  11       hydrALAZINE 25 MG tablet   Commonly known as:  APRESOLINE   Used for:  Essential hypertension, Heart transplanted (H)        Dose:  50 mg   Take 2 tablets (50 mg) by mouth 4 times daily Hold if top number BP less than 110.   Quantity:  270 tablet   Refills:  1       insulin isophane human 100 UNIT/ML injection   Commonly known as:  HumuLIN N PEN   Used for:  Type 2 diabetes mellitus with stage 5 chronic kidney disease not on chronic dialysis, without long-term current use of insulin (H)        30 units in AM and 16 units in the evening. Please check blood sugar four times daily.   Quantity:  3 mL   Refills:  0       lisinopril 20 MG tablet   Commonly known as:  PRINIVIL/ZESTRIL   Used for:  Heart transplant recipient (H)        Dose:  20 mg   Take 1 tablet (20 mg) by mouth daily   Quantity:  30 tablet   Refills:  1       loratadine 10 MG tablet   Commonly known as:  CLARITIN   Used for:  Hypertensive cardiopathy, SOB (shortness of breath)        Dose:  10 mg   Take 10 mg by mouth daily as needed Reported on 5/3/2017   Refills:  0       melatonin 1 MG Tabs tablet   Used for:  Heart transplanted (H)        Dose:  2 mg   Take 2 tablets (2 mg) by mouth nightly as needed for sleep   Quantity:  120 tablet   Refills:  1       mycophenolate 250 MG capsule   Commonly known as:  GENERIC EQUIVALENT   Used for:  Heart transplanted (H)        Dose:  500 mg   Take 2 capsules (500 mg) by mouth 2 times daily   Quantity:  180 capsule   Refills:  11       NEPHROCAPS 1 MG capsule        Dose:  1 capsule   Take 1 capsule by mouth daily   Quantity:  120 capsule   Refills:  0       nystatin 110166 UNIT/ML suspension   Commonly known as:  MYCOSTATIN   Used for:  Heart transplant recipient (H)         Dose:  8963665 Units   Swish and swallow 10 mLs (1,000,000 Units) in mouth 4 times daily   Quantity:  400 mL   Refills:  2       pantoprazole 40 MG EC tablet   Commonly known as:  PROTONIX   Used for:  Heart transplant recipient (H)        Dose:  40 mg   Take 1 tablet (40 mg) by mouth every morning   Quantity:  30 tablet   Refills:  11       pravastatin 40 MG tablet   Commonly known as:  PRAVACHOL   Used for:  Dyslipidemia        Dose:  40 mg   Take 1 tablet (40 mg) by mouth daily DC after current prescription completed; replace with rosuvastatin.   Quantity:  90 tablet   Refills:  0       predniSONE 5 MG tablet   Commonly known as:  DELTASONE   Used for:  Heart transplant recipient (H)        Dose:  15 mg   Take 3 tablets (15 mg) by mouth 2 times daily   Quantity:  180 tablet   Refills:  0       rosuvastatin 20 MG tablet   Commonly known as:  CRESTOR   Used for:  Heart transplant recipient (H)        Dose:  20 mg   Take 1 tablet (20 mg) by mouth daily   Quantity:  30 tablet   Refills:  1       tacrolimus 1 MG capsule   Commonly known as:  GENERIC EQUIVALENT        1 mg in AM and 2 mg at supper. Repeat level Wednesday.   Refills:  0       traMADol 50 MG tablet   Commonly known as:  ULTRAM   Used for:  Acute post-operative pain        Dose:  50 mg   Take 1 tablet (50 mg) by mouth every 6 hours as needed for moderate pain   Quantity:  10 tablet   Refills:  0       triamcinolone 0.1 % ointment   Commonly known as:  KENALOG   Used for:  Xerosis of skin        Apply topically 2 times daily   Quantity:  80 g   Refills:  0         CONTINUE these medicines which have NOT CHANGED        Dose / Directions    blood glucose monitoring lancets   Used for:  Type 2 diabetes mellitus with stage 5 chronic kidney disease not on chronic dialysis, without long-term current use of insulin (H)        Use to test blood sugar 2-3 times daily or as directed.  Ok to substitute alternative if insurance prefers.   Quantity:  102 each    Refills:  11       blood glucose monitoring test strip   Commonly known as:  no brand specified   Used for:  Type 2 diabetes mellitus with stage 5 chronic kidney disease not on chronic dialysis, without long-term current use of insulin (H)        Use to test blood sugar 2-3 times daily or as directed.   Quantity:  100 each   Refills:  11       order for DME   Used for:  CKD (chronic kidney disease) stage 5, GFR less than 15 ml/min (H)        Equipment being ordered: Commode () Treatment Diagnosis: ESRD on PD Pt has to be connected to PD all night and can not be disconnected, hence impending his mobility to go to the bathroom. At risk for infection if he does not have this equipment.   Quantity:  1 Units   Refills:  0                Protect others around you: Learn how to safely use, store and throw away your medicines at www.disposemymeds.org.         Follow-ups after your visit        Your next 10 appointments already scheduled     Aug 10, 2018 11:30 AM CDT   Heart Cath Heart Biopsy [facility] UUHCVR4   [facility] ([facility])    [address] [address]   [phone]            Aug 10, 2018  2:30 PM CDT   (Arrive by 2:15 PM)   RETURN HEART TRANSPLANT with VERONICA Rocha CNP   [facility] Heart Care ([facility])    [address]  [address] [place] [address]   [phone]            Aug 17, 2018  9:00 AM CDT   (Arrive by 8:45 AM)   RETURN DIABETES with Virginia Jacome MD   [facility] Endocrinology ([facility])    [address]  3rd Floor  [facility] [address]   [phone]            Aug 17, 2018  9:30 AM CDT   (Arrive by 9:15 AM)   RETURN WAIT LIST with Janine Trujillo PA-C   [facility] Solid Organ Transplant ([facility])    [address] [place] [address]  [address]  [facility] [address]   [phone]            Aug 17, 2018  10:30 AM CDT   (Arrive by 10:15 AM)   Office Visit with Eduardo Lea [facility] Medication Therapy Management ([facility])    [address]  3rd [facility] [address] [phone]           Bring a current list of meds and any records pertaining to this visit. For Physicals, please bring immunization records and any forms needing to be filled out. Please arrive 10 minutes early to complete paperwork.            Aug 17, 2018 11:30 AM CDT   US AORTA/IVC/ILIAC DUPLEX COMPLETE with UCUSV2   [facility] US ([facility])    [address]  [address] [facility] [address] [phone]           Please bring a list of your medicines (including vitamins, minerals and over-the-counter drugs). Also, tell your doctor about any allergies you may have. Wear comfortable clothes and leave your valuables at home.  Adults: No eating or drinking for 8 hours before the exam. You may take medicine with a small sip of water.  Children: - Infants, breast-fed: may have breast milk up to 2 hours before exam. - Infants, formula: may have bottle until 4 hours before exam. - Children 1-5 years: No food or drink for 4 hours before exam. - Children 6 -12 years: No food or drink for 6 hours before exam. - Children over 12 years: No food or drink for 8 hours before exam. - J Tube Fed: No need to stop feedings.  Please call the Imaging Department at your exam site with any questions.            Aug 24, 2018  9:00 AM CDT   LAB with UU LAB GOLD WAITING   [facility], Lab ([facility])    [address] [address]              Please do not eat 10-12 hours before your appointment if you are coming in fasting for labs on lipids, cholesterol, or glucose (sugar). This does not apply to pregnant women. Water, hot tea and black coffee (with nothing added) are okay. Do not drink other  fluids, diet soda or chew gum.            Aug 24, 2018  9:30 AM CDT   Procedure - 2.5 hour with U2A ROOM 17   [address] [facility] ([facility])    [address] [address]               Aug 24, 2018 10:30 AM CDT   Heart Cath Heart Biopsy with UUHCVR3   [facility] [place],  Heart Cath Lab ([facility])    [address] [id number] [phone]            Aug 24, 2018  1:30 PM CDT   (Arrive by 1:15 PM)   RETURN HEART TRANSPLANT with VERONICA Rocha [facility] ([facility])    [address]  [address]  [facility] [address] [phone]               Care Instructions        Further instructions from your care team       [facility]                        Interventional Cardiology  Discharge Instructions   Post Right Heart Cath      AFTER YOU GO HOME:    DO drink plenty of fluids    DO resume your regular diet and medications unless otherwise instructed by your Primary Physician    Do Not scrub the procedure site vigorously    No lotion or powder to the puncture site for 3 days    CALL YOUR PRIMARY PHYSICIAN IF: You may resume all normal activity.  Monitor neck site for bleeding, swelling, or voice changes. If you notice bleeding or swelling immediately apply pressure to the site and call number below to speak with Cardiology Fellow.  If you experience any changes in your breathing you should call your doctor immediately or come to the closest Emergency Department.  Do not drive yourself.    ADDITIONAL INSTRUCTIONS: Medications: You are to resume all home medications including anticoagulation therapy unless otherwise advised by your primary cardiologist or nurse coordinator.    Follow Up: Per your primary cardiology team    If you have any questions or concerns regarding your procedure site please call [phone] at anytime and ask  for Cardiology Fellow on call.  They are available 24 hours a day.  You may also contact the Cardiology Clinic after hours number at [phone].                                                       Telephone Numbers [phone] Monday-Friday 8:00 am to 4:30 pm    [phone] After 4:30 pm Monday-Friday, Weekends & Holidays  Ask for Interventional Cardiologist on call. Someone is on call 24 hours/day   [facility] toll free number [phone] Monday-Friday 8:00 am to 4:30 pm   [facility] Emergency Dept [phone]                    Additional Information About Your Visit        MyChart Information     [employer] gives you secure access to your electronic health record. If you see a primary care provider, you can also send messages to your care team and make appointments. If you have questions, please call your primary care clinic.  If you do not have a primary care provider, please call [phone] and they will assist you.        Care EveryWhere ID     This is your Care EveryWhere ID. This could be used by other organizations to access your [facility] medical records  [id number]         Primary Care Provider Office Phone # Fax #    Yahir Turcios [phone]      Equal Access to Services     JOHN HAUSER : Hadii marsha wang hadasho Sobriandaali, waaxda luqadaha, qaybta kaalmada adeegyada, jose ayala. So [facility] [phone].    ATENCIÓN: Si habla español, tiene a ortiz disposición servicios gratuitos de asistencia lingüística. Llame al [phone].    We comply with applicable federal civil rights laws and [place] laws. We do not discriminate on the basis of race, color, national origin, age, disability, sex, sexual orientation, or gender identity.            Thank you!     Thank you for choosing [facility] for your care. Our goal is always to provide you with excellent care. Hearing back from our patients is one way we can continue to improve our services. Please take a  few minutes to complete the written survey that you may receive in the mail after you visit with us. Thank you!             Medication List: This is a list of all your medications and when to take them. Check marks below indicate your daily home schedule. Keep this list as a reference.      Medications           Morning Afternoon Evening Bedtime As Needed    acetaminophen 325 MG tablet   Commonly known as:  TYLENOL   Take 2 tablets (650 mg) by mouth every 4 hours as needed for mild pain (multimodal surgical pain management along with NSAIDS and opioid medication as indicated based on pain control and physical function.)                                albuterol 108 (90 Base) MCG/ACT inhaler   Commonly known as:  PROAIR HFA/PROVENTIL HFA/VENTOLIN HFA   Inhale 6 puffs into the lungs every 4 hours as needed for shortness of breath / dyspnea                                apixaban ANTICOAGULANT 2.5 MG tablet   Commonly known as:  ELIQUIS   Take 1 tablet (2.5 mg) by mouth 2 times daily                                aspirin 81 MG tablet   Take 1 tablet (81 mg) by mouth at bedtime                                biotin 5 MG Caps   Commonly known as:  BIOTIN 5000   Take 5 mg by mouth daily                                blood glucose monitoring lancets   Use to test blood sugar 2-3 times daily or as directed.  Ok to substitute alternative if insurance prefers.                                blood glucose monitoring test strip   Commonly known as:  no brand specified   Use to test blood sugar 2-3 times daily or as directed.                                cloNIDine 0.1 MG tablet   Commonly known as:  CATAPRES   Take 1 tablet (0.1 mg) by mouth At Bedtime                                fluticasone 50 MCG/ACT spray   Commonly known as:  FLONASE   Spray 1-2 sprays into both nostrils daily                                hydrALAZINE 25 MG tablet   Commonly known as:  APRESOLINE   Take 2 tablets (50 mg) by mouth 4 times daily Hold if  top number BP less than 110.                                insulin isophane human 100 UNIT/ML injection   Commonly known as:  HumuLIN N PEN   30 units in AM and 16 units in the evening. Please check blood sugar four times daily.                                lisinopril 20 MG tablet   Commonly known as:  PRINIVIL/ZESTRIL   Take 1 tablet (20 mg) by mouth daily                                loratadine 10 MG tablet   Commonly known as:  CLARITIN   Take 10 mg by mouth daily as needed Reported on 5/3/2017                                melatonin 1 MG Tabs tablet   Take 2 tablets (2 mg) by mouth nightly as needed for sleep                                mycophenolate 250 MG capsule   Commonly known as:  GENERIC EQUIVALENT   Take 2 capsules (500 mg) by mouth 2 times daily                                NEPHROCAPS 1 MG capsule   Take 1 capsule by mouth daily                                nystatin 152139 UNIT/ML suspension   Commonly known as:  MYCOSTATIN   Swish and swallow 10 mLs (1,000,000 Units) in mouth 4 times daily                                order for DME   Equipment being ordered: Commode () Treatment Diagnosis: ESRD on PD Pt has to be connected to PD all night and can not be disconnected, hence impending his mobility to go to the bathroom. At risk for infection if he does not have this equipment.                                pantoprazole 40 MG EC tablet   Commonly known as:  PROTONIX   Take 1 tablet (40 mg) by mouth every morning                                pravastatin 40 MG tablet   Commonly known as:  PRAVACHOL   Take 1 tablet (40 mg) by mouth daily DC after current prescription completed; replace with rosuvastatin.                                predniSONE 5 MG tablet   Commonly known as:  DELTASONE   Take 3 tablets (15 mg) by mouth 2 times daily                                rosuvastatin 20 MG tablet   Commonly known as:  CRESTOR   Take 1 tablet (20 mg) by mouth daily                                 tacrolimus 1 MG capsule   Commonly known as:  GENERIC EQUIVALENT   1 mg in AM and 2 mg at supper. Repeat level Wednesday.                                traMADol 50 MG tablet   Commonly known as:  ULTRAM   Take 1 tablet (50 mg) by mouth every 6 hours as needed for moderate pain                                triamcinolone 0.1 % ointment   Commonly known as:  KENALOG   Apply topically 2 times daily                                  ASK your doctor about these medications           Morning Afternoon Evening Bedtime As Needed    ceFEPIme 1 g Solr vial   Commonly known as:  MAXIPIME   Inject 2 g into the vein every 48 hours for 2 doses   Ask about: Should I take this medication?

## 2018-08-10 NOTE — IP AVS SNAPSHOT
[address] [address]                                       After Visit Summary   8/10/2018    Murray Nicholson    MRN: [id number]           After Visit Summary Signature Page     I have received my discharge instructions, and my questions have been answered. I have discussed any challenges I see with this plan with the nurse or doctor.    ..........................................................................................................................................  Patient/Patient Representative Signature      ..........................................................................................................................................  Patient Representative Print Name and Relationship to Patient    ..................................................               ................................................  Date                                            Time    ..........................................................................................................................................  Reviewed by Signature/Title    ...................................................              ..............................................  Date                                                            Time

## 2018-08-10 NOTE — PROGRESS NOTES
[facility]    HPI:   Mr. Nicholson is a 63 year old male s/p recent orthotopic heart transplant who presents to clinic today for hospital follow-up. Patient has history of systolic heart failure 2/2 NICM, CKD on HD, HTN, dyslipidemia, DM2. He was admitted 4/16/2018 for expediated workup for LVAD/heart/kidney transplant and was started on inotropes. He was listed for both heart/kidney transplant but ultimately underwent solo orthotopic heart transplant on 6/14/18. He was noted to have moderately dilated ascending aortic aneurysm introaop. Post-op course complicated by leukocytosis - started on empiric bx (no source of infection found), incidental pneumoperitoneum, and RIJ thrombus infected with CoNS and was started on Eliquis. He remains on HD listed for renal Tx. Biospies have been negative for rejection. RHC showed normal cardiac output and filling pressures, echo with normal graft function. Baseline angiogram shows donor coronary disease in all three coronaries arteries including 40% mLAD lesion and 80% OM lesion. He had leukopenia early post transplant, we decreased Cellcept then d.c'ed Bactrim and Valcyte. He was ultimately admitted for neutropenic fever on 7/26. He was also noted to have necrotic mouth ulcer. Blood cultures grew +for pseudomonas arguinosa - source felt to be prececal colitis. Cultures of mouth ulcer were negative for viral process but grew pseudomonas. He was treated with cefepime for 14 days (2g 3x weekly after HD).  Patient also had elevated BPs while inpatient so meds adjusted, ISDN was d/c'ed due to reports of HA. He had a negative head CT during admission. He was d/c'ed 8/6 with two doses of cefepime left.     Since hospital dc patient reports feeling well. He has no specific complaints today except he hesitantly admits to a fever of 100.6 last night and feeling hot around that time. This morning's temperatures were 99.1 and 98.3. He denies chills and night sweats. He had  "dose of cefepime at [facility] yesterday (last dose). Mouth ulcer has improved, starting to \"slough off\". BP at home at 130s/80s mostly. HRs 80s. He states HA has improved since stopping ISDN. Patient denies nausea, vomiting, or diarrhea. Patient denies new oral lesions, rashes, lightheadedness, dizziness, chest pain, palpitations, LE edema, orthopnea, PND. Denies bleeding issues on apixaban. He continues to decline starting cardiac rehab for now - feels too busy with other appts.    PAST MEDICAL HISTORY:  Past Medical History:   Diagnosis Date     (HFpEF) heart failure with preserved ejection fraction (H)      Allergic rhinitis, cause unspecified      Anemia of chronic kidney failure      AS (aortic stenosis)      Ascending aortic aneurysm (H)      Bicuspid aortic valve      CAD (coronary artery disease)      Chronic kidney disease, stage 5 (H)      Congestive heart failure, unspecified      Dyslipidemia      Esophageal reflux      ESRD (end stage renal disease) (H)      Hypersomnia with sleep apnea, unspecified      Hypertension      MGUS (monoclonal gammopathy of unknown significance)      Mitral regurgitation      SHEELA (obstructive sleep apnea)      Systolic heart failure (H)      Type 2 diabetes mellitus (H)        FAMILY HISTORY:  Family History   Problem Relation Age of Onset     C.A.D. Father       from-never knew father-age 60     Diabetes Father      Cerebrovascular Disease Father      Hypertension No family hx of      Breast Cancer No family hx of      Cancer - colorectal No family hx of      Prostate Cancer No family hx of      KIDNEY DISEASE No family hx of      Melanoma No family hx of      Skin Cancer No family hx of        SOCIAL HISTORY:  , not currently working    CURRENT MEDICATIONS:    Current Outpatient Prescriptions on File Prior to Visit:  acetaminophen (TYLENOL) 325 MG tablet Take 2 tablets (650 mg) by mouth every 4 hours as needed for mild pain (multimodal surgical pain management " along with NSAIDS and opioid medication as indicated based on pain control and physical function.)   albuterol (PROAIR HFA/PROVENTIL HFA/VENTOLIN HFA) 108 (90 Base) MCG/ACT Inhaler Inhale 6 puffs into the lungs every 4 hours as needed for shortness of breath / dyspnea   apixaban ANTICOAGULANT (ELIQUIS) 2.5 MG tablet Take 1 tablet (2.5 mg) by mouth 2 times daily   ASPIRIN 81 MG OR TABS Take 1 tablet (81 mg) by mouth at bedtime   biotin (BIOTIN 5000) 5 MG CAPS Take 5 mg by mouth daily   blood glucose monitoring (ACCU-CHEK FASTCLIX) lancets Use to test blood sugar 2-3 times daily or as directed.  Ok to substitute alternative if insurance prefers.   blood glucose monitoring (NO BRAND SPECIFIED) test strip Use to test blood sugar 2-3 times daily or as directed.   [] ceFEPIme (MAXIPIME) 1 g SOLR vial Inject 2 g into the vein every 48 hours for 2 doses   cloNIDine (CATAPRES) 0.1 MG tablet Take 1 tablet (0.1 mg) by mouth At Bedtime   fluticasone (FLONASE) 50 MCG/ACT spray Spray 1-2 sprays into both nostrils daily   hydrALAZINE (APRESOLINE) 25 MG tablet Take 2 tablets (50 mg) by mouth 4 times daily Hold if top number BP less than 110. (Patient taking differently: Take 75 mg by mouth 3 times daily Hold if top number BP less than 110.)   insulin isophane human (HUMULIN N PEN) 100 UNIT/ML injection 30 units in AM and 16 units in the evening. Please check blood sugar four times daily.   lisinopril (PRINIVIL/ZESTRIL) 20 MG tablet Take 1 tablet (20 mg) by mouth daily   loratadine (CLARITIN) 10 MG tablet Take 10 mg by mouth daily as needed Reported on 5/3/2017   melatonin 1 MG TABS tablet Take 2 tablets (2 mg) by mouth nightly as needed for sleep   mycophenolate (GENERIC EQUIVALENT) 250 MG capsule Take 2 capsules (500 mg) by mouth 2 times daily   NEPHROCAPS 1 MG capsule Take 1 capsule by mouth daily   nystatin (MYCOSTATIN) 096704 UNIT/ML suspension Swish and swallow 10 mLs (1,000,000 Units) in mouth 4 times daily   order for  "DME Equipment being ordered: Carol ()Treatment Diagnosis: ESRD on PDPt has to be connected to PD all night and can not be disconnected, hence impending his mobility to go to the bathroom. At risk for infection if he does not have this equipment. (Patient not taking: Reported on 7/26/2018)   pantoprazole (PROTONIX) 40 MG EC tablet Take 1 tablet (40 mg) by mouth every morning   pravastatin (PRAVACHOL) 40 MG tablet Take 1 tablet (40 mg) by mouth daily DC after current prescription completed; replace with rosuvastatin.   predniSONE (DELTASONE) 5 MG tablet Take 3 tablets (15 mg) by mouth 2 times daily   rosuvastatin (CRESTOR) 20 MG tablet Take 1 tablet (20 mg) by mouth daily   tacrolimus (GENERIC EQUIVALENT) 1 MG capsule 1 mg in AM and 2 mg at supper. Repeat level Wednesday.   traMADol (ULTRAM) 50 MG tablet Take 1 tablet (50 mg) by mouth every 6 hours as needed for moderate pain   triamcinolone (KENALOG) 0.1 % ointment Apply topically 2 times daily     Current Facility-Administered Medications on File Prior to Visit:  [COMPLETED] lidocaine (LMX4) cream   [DISCONTINUED] HOLD: apixaban (ELIQUIS) 24 hours pre-procedure       ROS:  CONSTITUTIONAL: See HPI  HEENT: Headache improved   CV: See HPI  PULMONARY: See HPI  GI: See HPI  : Denies urinary alterations, dysuria, urinary frequency, hematuria, and abnormal drainage.   EXT: Denies lower extremity edema or color changes.   SKIN: Denies abnormal rashes or lesions.   MUSCULOSKELETAL: Denies upper or lower extremity weakness and pain.   NEUROLOGIC: Denies lightheadedness, dizziness, seizures, or upper or lower extremity paresthesia.     EXAM:  /88 (BP Location: Left arm, Patient Position: Chair, Cuff Size: Adult Regular)  Pulse 85  Ht 1.753 m (5' 9\")  Wt 75.8 kg (167 lb)  SpO2 100%  BMI 24.66 kg/m2 Temp 98.3.     GENERAL: Appears comfortable, in no acute distress.   HEENT: Eye symmetrical, no discharge or icterus bilaterally. Mucous membranes moist and " without lesions. No thrush.   CV: RRR, +S1S2, no murmur, rub, or gallop. JVP just above clavicle at  45 degrees.   RESPIRATORY: Respirations regular, even, and unlabored. Lungs CTA throughout.   GI: Soft and non distended with normoactive bowel sounds present in all quadrants. No tenderness, rebound, guarding. No hepatomegaly.   EXTREMITIES: No peripheral edema. 2+ bilateral pedal pulses.   NEUROLOGIC: Alert and oriented x 3. No focal deficits.   MUSCULOSKELETAL: No joint swelling or tenderness.   SKIN: No jaundice. No rashes or lesions.     Labs - reviewed with patient in clinic today:  CBC RESULTS:  Lab Results   Component Value Date    WBC 3.6 (L) 08/10/2018    RBC 2.69 (L) 08/10/2018    HGB 8.0 (L) 08/10/2018    HCT 25.2 (L) 08/10/2018    MCV 94 08/10/2018    MCH 29.7 08/10/2018    MCHC 31.7 08/10/2018    RDW 21.6 (H) 08/10/2018     08/10/2018       CMP RESULTS:  Lab Results   Component Value Date     08/10/2018    POTASSIUM 4.8 08/10/2018    CHLORIDE 102 08/10/2018    CO2 24 08/10/2018    ANIONGAP 9 08/10/2018     (H) 08/10/2018    BUN 32 (H) 08/10/2018    CR 4.87 (H) 08/10/2018    GFRESTIMATED 12 (L) 08/10/2018    GFRESTBLACK 15 (L) 08/10/2018    TRINI 7.7 (L) 08/10/2018    BILITOTAL 0.4 08/08/2018    ALBUMIN 1.9 (L) 07/29/2018    ALKPHOS 156 (H) 08/08/2018    ALT 22 08/08/2018    AST 34 08/08/2018        INR RESULTS:  Lab Results   Component Value Date    INR 1.24 (H) 07/26/2018       LIPID RESULTS:  Lab Results   Component Value Date    CHOL 175 07/18/2018    HDL 92 07/18/2018    LDL 66 07/18/2018    TRIG 88 07/18/2018    CHOLHDLRATIO 5.0 08/10/2015       IMMUNOSUPPRESSANT LEVELS:  Lab Results   Component Value Date    TACROL 9.4 08/08/2018    DOSTAC 2000 8/7/18 08/08/2018       No components found for: CK  Lab Results   Component Value Date    MAG 1.6 08/10/2018     Lab Results   Component Value Date    A1C 7.0 (H) 07/18/2018     Lab Results   Component Value Date    PHOS 2.8 08/10/2018      Lab Results   Component Value Date    NTBNP 92198 (H) 11/08/2016     Lab Results   Component Value Date    SAITESTMET [facility] 07/18/2018    SAICELL Class I 07/18/2018    [id number] None 07/18/2018    [id number] None 07/18/2018    SAIREPCOM  07/18/2018      Test performed by modified procedure. Serum heat inactivated and tested   by a modified ([place]) protocol including fetal calf serum addition.   High-risk, mfi >3,000. Mod-risk, mfi 500-3,000.       Lab Results   Component Value Date    SAIITESTME [facility] 07/18/2018    SAIICELL Class II 07/18/2018    [id number] None 07/18/2018    [id number] None 07/18/2018    SAIIREPCOM  07/18/2018      Test performed by modified procedure. Serum heat inactivated and tested   by a modified ([place]) protocol including fetal calf serum addition.   High-risk, mfi >3,000. Mod-risk, mfi 500-3,000.       Lab Results   Component Value Date    CSPEC Plasma, EDTA anticoagulant 08/08/2018       Diagnostic Studies:  TTE 7/20/18  Global and regional left ventricular function is hyperkinetic with an EF >70%.  Mild-moderate left ventricular hypertrophy.  Right ventricle with normal size and systolic function with mild hypertrophy.  No significant valve disease and no change in LVEF.    Cor angiogram 7/18/18      RHC 7/18/18        Assessment/Plan:  Mr. Nicholson is a 63 year old male who is s/p orthotopic heart transplant on 6/14/18 who presents to clinic for hospital follow-up of pseudomonas bacteremia and necrotic mouth ulcer. Post-op course was complicated leukocytosis for which he received abx, RIJ thrombus infected with CoNS, an incidental pneumoperitoneum. We had ongoing issues with leukopenia early on so MMF decreased and Valcyte and Bactrim stopped. Baseline angiogram showed diffuse donor CAD. He has no DSAs but repeat biopsies with weakly positive c4d (negative c3d). Graft function has been normal. Today, he presents feeling well without any specific complaints except low grade fever last  night. He had just finished IV abx course for pseudomonas. He does not feel feverish or fatigued, BP is wnl, and WBC 3.6. We will repeat blood cultures and extended his course of abx - then touch base with ID on Monday. D/w patient - recommend very low threshold to present to ED over the weekend for recurrent fevers, low blood pressures, N/V, feeling unwell - patient agrees. Plan d/w Dr Blum, inpatient attending.     # Status post OHT on 6/14/18, history of NICM  # Donor 3 vessel CAD  # Chronic immunosuppression  * Rejection history: none.   * Recent immunosuppression changes: decreased MMF dt leukopenia   * Last biopsy: 8/2/18 0R, weak-moderately positive c4d negative c3d  * Intolerance to medications: none    Immunosuppression:  - Steroids: prednisone per taper   - CNI: Tacrolimus. Trough level goal ~8  - Antiproliferative: mycophenolate 500 mg bid    Prophylaxis:  - PCP: off Bactrim due to leukopenia, pentamidine 7/20/18 and every 28 days due 8/17  - Thrush: Nystatin   - PPI: pantoprazole 40 mg  - CAV: ASA 81 mg and change pravastatin to Crestor 20 mg when runs out  - CMV high risk D+ R- : Valcyte d/c'ed due to leukopenia, weekly CMV PCR  - Continue calcium and vitamin D    # Pseudomonas bacteremia concerned for undertreatment given fevers overnight, repeat blood cultures today (including dialysis access) and arrange for another dose of cefepime tomorrow at HD (renally dosed 2 g three times weekly).    # Mouth ulcer, improving sloughing off per patient, no areas of necrosis on exam    # Leukopenia improving he is off Valcyte, checking weekly CMV PCR and off Bactrim    # HTN  # Ascending aortic dilation  -hydralazine 75 mg tid, lisinopril 20 mg daily, clonidine 0.1 mg at hs, ISDN stopped due to HA    # RIJ Thrombus with CoNS  -continue Eliquis, hold for bxs  -s/p Vanco course     # ESRD listed for renal transplant, on HD TTS, EDW 76 kg      25 minutes spent face-to-face with patient, >50% in counseling and/or  coordination of care as described above      Ramya Suh DNP, NP-C  8/10/2018

## 2018-08-10 NOTE — PROGRESS NOTES
Pt returned post procedure; pt awake and alert; denies pain; taking po fluids without problem. Site on Left neck is dry and intact; pt steady on his feet; discharge instructions to pt, stated understanding, denies questions. DC to home per self; no sedation for procedure.

## 2018-08-10 NOTE — NURSING NOTE
Chief Complaint   Patient presents with     Follow Up For     8 week follow up post heart transplant      Vitals were performed, medications were reconciled.  Pushpa Kinney MA

## 2018-08-12 ENCOUNTER — ANESTHESIA EVENT (OUTPATIENT)
Dept: SURGERY | Facility: CLINIC | Age: 63
DRG: 329 | End: 2018-08-12
Payer: MEDICARE

## 2018-08-12 ENCOUNTER — HOSPITAL ENCOUNTER (INPATIENT)
Facility: CLINIC | Age: 63
LOS: 30 days | Discharge: NURSING, REHAB OR LONG-TERM CARE | DRG: 329 | End: 2018-09-11
Attending: EMERGENCY MEDICINE | Admitting: INTERNAL MEDICINE
Payer: MEDICARE

## 2018-08-12 ENCOUNTER — APPOINTMENT (OUTPATIENT)
Dept: ULTRASOUND IMAGING | Facility: CLINIC | Age: 63
DRG: 329 | End: 2018-08-12
Payer: MEDICARE

## 2018-08-12 ENCOUNTER — ANESTHESIA (OUTPATIENT)
Dept: SURGERY | Facility: CLINIC | Age: 63
DRG: 329 | End: 2018-08-12
Payer: MEDICARE

## 2018-08-12 ENCOUNTER — TELEPHONE (OUTPATIENT)
Dept: TRANSPLANT | Facility: CLINIC | Age: 63
End: 2018-08-12

## 2018-08-12 ENCOUNTER — APPOINTMENT (OUTPATIENT)
Dept: CT IMAGING | Facility: CLINIC | Age: 63
DRG: 329 | End: 2018-08-12
Attending: STUDENT IN AN ORGANIZED HEALTH CARE EDUCATION/TRAINING PROGRAM
Payer: MEDICARE

## 2018-08-12 DIAGNOSIS — N18.5 CKD (CHRONIC KIDNEY DISEASE) STAGE 5, GFR LESS THAN 15 ML/MIN (H): ICD-10-CM

## 2018-08-12 DIAGNOSIS — R52 MODERATE PAIN: Primary | ICD-10-CM

## 2018-08-12 DIAGNOSIS — R14.1 FLATULENCE, ERUCTATION AND GAS PAIN: ICD-10-CM

## 2018-08-12 DIAGNOSIS — K59.00 CONSTIPATION, UNSPECIFIED CONSTIPATION TYPE: ICD-10-CM

## 2018-08-12 DIAGNOSIS — Z94.1 HEART REPLACED BY TRANSPLANT (H): ICD-10-CM

## 2018-08-12 DIAGNOSIS — E16.2 HYPOGLYCEMIA: ICD-10-CM

## 2018-08-12 DIAGNOSIS — R14.3 FLATULENCE, ERUCTATION AND GAS PAIN: ICD-10-CM

## 2018-08-12 DIAGNOSIS — R78.81 BACTEREMIA DUE TO PSEUDOMONAS: ICD-10-CM

## 2018-08-12 DIAGNOSIS — R14.2 FLATULENCE, ERUCTATION AND GAS PAIN: ICD-10-CM

## 2018-08-12 DIAGNOSIS — E11.00 TYPE 2 DIABETES MELLITUS WITH HYPEROSMOLARITY WITHOUT COMA, WITH LONG-TERM CURRENT USE OF INSULIN (H): ICD-10-CM

## 2018-08-12 DIAGNOSIS — K92.1 BLOOD IN STOOL: ICD-10-CM

## 2018-08-12 DIAGNOSIS — I10 HYPERTENSION GOAL BP (BLOOD PRESSURE) < 130/80: ICD-10-CM

## 2018-08-12 DIAGNOSIS — R50.9 FEVER, UNSPECIFIED FEVER CAUSE: ICD-10-CM

## 2018-08-12 DIAGNOSIS — Z94.1 HEART TRANSPLANT RECIPIENT (H): ICD-10-CM

## 2018-08-12 DIAGNOSIS — Z79.4 TYPE 2 DIABETES MELLITUS WITH HYPEROSMOLARITY WITHOUT COMA, WITH LONG-TERM CURRENT USE OF INSULIN (H): ICD-10-CM

## 2018-08-12 DIAGNOSIS — B96.5 BACTEREMIA DUE TO PSEUDOMONAS: ICD-10-CM

## 2018-08-12 DIAGNOSIS — R50.9 FEVER AND CHILLS: ICD-10-CM

## 2018-08-12 DIAGNOSIS — I10 ESSENTIAL HYPERTENSION: ICD-10-CM

## 2018-08-12 DIAGNOSIS — E11.649 TYPE 2 DIABETES MELLITUS WITH HYPOGLYCEMIA WITHOUT COMA, UNSPECIFIED LONG TERM INSULIN USE STATUS: ICD-10-CM

## 2018-08-12 DIAGNOSIS — R68.83 CHILLS: ICD-10-CM

## 2018-08-12 LAB
ANION GAP SERPL CALCULATED.3IONS-SCNC: 11 MMOL/L (ref 3–14)
ANION GAP SERPL CALCULATED.3IONS-SCNC: 11 MMOL/L (ref 3–14)
ANION GAP SERPL CALCULATED.3IONS-SCNC: 8 MMOL/L (ref 3–14)
APTT PPP: 37 SEC (ref 22–37)
BASOPHILS # BLD AUTO: 0 10E9/L (ref 0–0.2)
BASOPHILS NFR BLD AUTO: 0.2 %
BLD PROD TYP BPU: NORMAL
BLD UNIT ID BPU: 0
BLOOD PRODUCT CODE: NORMAL
BPU ID: NORMAL
BUN SERPL-MCNC: 22 MG/DL (ref 7–30)
BUN SERPL-MCNC: 24 MG/DL (ref 7–30)
BUN SERPL-MCNC: 26 MG/DL (ref 7–30)
C COLI+JEJUNI+LARI FUSA STL QL NAA+PROBE: NOT DETECTED
C DIFF TOX B STL QL: POSITIVE
CALCIUM SERPL-MCNC: 7.4 MG/DL (ref 8.5–10.1)
CALCIUM SERPL-MCNC: 7.5 MG/DL (ref 8.5–10.1)
CALCIUM SERPL-MCNC: 7.6 MG/DL (ref 8.5–10.1)
CHLORIDE SERPL-SCNC: 100 MMOL/L (ref 94–109)
CHLORIDE SERPL-SCNC: 101 MMOL/L (ref 94–109)
CHLORIDE SERPL-SCNC: 103 MMOL/L (ref 94–109)
CO2 SERPL-SCNC: 25 MMOL/L (ref 20–32)
CO2 SERPL-SCNC: 25 MMOL/L (ref 20–32)
CO2 SERPL-SCNC: 26 MMOL/L (ref 20–32)
CREAT SERPL-MCNC: 3.6 MG/DL (ref 0.66–1.25)
CREAT SERPL-MCNC: 4.01 MG/DL (ref 0.66–1.25)
CREAT SERPL-MCNC: 4.45 MG/DL (ref 0.66–1.25)
CRP SERPL-MCNC: 76 MG/L (ref 0–8)
DIFFERENTIAL METHOD BLD: ABNORMAL
EC STX1 GENE STL QL NAA+PROBE: NOT DETECTED
EC STX2 GENE STL QL NAA+PROBE: NOT DETECTED
ENTERIC PATHOGEN COMMENT: NORMAL
EOSINOPHIL # BLD AUTO: 0 10E9/L (ref 0–0.7)
EOSINOPHIL NFR BLD AUTO: 0.2 %
ERYTHROCYTE [DISTWIDTH] IN BLOOD BY AUTOMATED COUNT: 22 % (ref 10–15)
ERYTHROCYTE [DISTWIDTH] IN BLOOD BY AUTOMATED COUNT: 22 % (ref 10–15)
ERYTHROCYTE [DISTWIDTH] IN BLOOD BY AUTOMATED COUNT: 22.2 % (ref 10–15)
ERYTHROCYTE [SEDIMENTATION RATE] IN BLOOD BY WESTERGREN METHOD: 72 MM/H (ref 0–20)
FIBRINOGEN PPP-MCNC: 407 MG/DL (ref 200–420)
GFR SERPL CREATININE-BSD FRML MDRD: 13 ML/MIN/1.7M2
GFR SERPL CREATININE-BSD FRML MDRD: 15 ML/MIN/1.7M2
GFR SERPL CREATININE-BSD FRML MDRD: 17 ML/MIN/1.7M2
GLUCOSE BLDC GLUCOMTR-MCNC: 101 MG/DL (ref 70–99)
GLUCOSE BLDC GLUCOMTR-MCNC: 103 MG/DL (ref 70–99)
GLUCOSE BLDC GLUCOMTR-MCNC: 111 MG/DL (ref 70–99)
GLUCOSE BLDC GLUCOMTR-MCNC: 113 MG/DL (ref 70–99)
GLUCOSE BLDC GLUCOMTR-MCNC: 117 MG/DL (ref 70–99)
GLUCOSE BLDC GLUCOMTR-MCNC: 129 MG/DL (ref 70–99)
GLUCOSE BLDC GLUCOMTR-MCNC: 133 MG/DL (ref 70–99)
GLUCOSE BLDC GLUCOMTR-MCNC: 58 MG/DL (ref 70–99)
GLUCOSE BLDC GLUCOMTR-MCNC: 68 MG/DL (ref 70–99)
GLUCOSE BLDC GLUCOMTR-MCNC: 73 MG/DL (ref 70–99)
GLUCOSE BLDC GLUCOMTR-MCNC: 80 MG/DL (ref 70–99)
GLUCOSE BLDC GLUCOMTR-MCNC: 89 MG/DL (ref 70–99)
GLUCOSE BLDC GLUCOMTR-MCNC: 95 MG/DL (ref 70–99)
GLUCOSE SERPL-MCNC: 108 MG/DL (ref 70–99)
GLUCOSE SERPL-MCNC: 131 MG/DL (ref 70–99)
GLUCOSE SERPL-MCNC: 38 MG/DL (ref 70–99)
HCT VFR BLD AUTO: 23.2 % (ref 40–53)
HCT VFR BLD AUTO: 23.7 % (ref 40–53)
HCT VFR BLD AUTO: 25.8 % (ref 40–53)
HGB BLD-MCNC: 7 G/DL (ref 13.3–17.7)
HGB BLD-MCNC: 7.4 G/DL (ref 13.3–17.7)
HGB BLD-MCNC: 7.5 G/DL (ref 13.3–17.7)
HGB BLD-MCNC: 8.2 G/DL (ref 13.3–17.7)
IMM GRANULOCYTES # BLD: 0.2 10E9/L (ref 0–0.4)
IMM GRANULOCYTES NFR BLD: 3.8 %
INR PPP: 1.25 (ref 0.86–1.14)
INR PPP: 1.31 (ref 0.86–1.14)
LACTATE BLD-SCNC: 1.4 MMOL/L (ref 0.7–2)
LMWH PPP CHRO-ACNC: 0.87 IU/ML
LYMPHOCYTES # BLD AUTO: 0.4 10E9/L (ref 0.8–5.3)
LYMPHOCYTES NFR BLD AUTO: 7.6 %
MAGNESIUM SERPL-MCNC: 1.4 MG/DL (ref 1.6–2.3)
MCH RBC QN AUTO: 29.4 PG (ref 26.5–33)
MCH RBC QN AUTO: 29.5 PG (ref 26.5–33)
MCH RBC QN AUTO: 29.8 PG (ref 26.5–33)
MCHC RBC AUTO-ENTMCNC: 31.6 G/DL (ref 31.5–36.5)
MCHC RBC AUTO-ENTMCNC: 31.8 G/DL (ref 31.5–36.5)
MCHC RBC AUTO-ENTMCNC: 31.9 G/DL (ref 31.5–36.5)
MCV RBC AUTO: 92 FL (ref 78–100)
MCV RBC AUTO: 93 FL (ref 78–100)
MCV RBC AUTO: 94 FL (ref 78–100)
MONOCYTES # BLD AUTO: 0.5 10E9/L (ref 0–1.3)
MONOCYTES NFR BLD AUTO: 9.9 %
NEUTROPHILS # BLD AUTO: 3.7 10E9/L (ref 1.6–8.3)
NEUTROPHILS NFR BLD AUTO: 78.3 %
NOROV GI+II ORF1-ORF2 JNC STL QL NAA+PR: NOT DETECTED
NRBC # BLD AUTO: 0.1 10*3/UL
NRBC BLD AUTO-RTO: 2 /100
PHOSPHATE SERPL-MCNC: 2.6 MG/DL (ref 2.5–4.5)
PLATELET # BLD AUTO: 111 10E9/L (ref 150–450)
PLATELET # BLD AUTO: 131 10E9/L (ref 150–450)
PLATELET # BLD AUTO: 136 10E9/L (ref 150–450)
POTASSIUM SERPL-SCNC: 3.5 MMOL/L (ref 3.4–5.3)
POTASSIUM SERPL-SCNC: 3.5 MMOL/L (ref 3.4–5.3)
POTASSIUM SERPL-SCNC: 3.8 MMOL/L (ref 3.4–5.3)
RBC # BLD AUTO: 2.52 10E12/L (ref 4.4–5.9)
RBC # BLD AUTO: 2.52 10E12/L (ref 4.4–5.9)
RBC # BLD AUTO: 2.78 10E12/L (ref 4.4–5.9)
RVA NSP5 STL QL NAA+PROBE: NOT DETECTED
SALMONELLA SP RPOD STL QL NAA+PROBE: NOT DETECTED
SHIGELLA SP+EIEC IPAH STL QL NAA+PROBE: NOT DETECTED
SODIUM SERPL-SCNC: 136 MMOL/L (ref 133–144)
SODIUM SERPL-SCNC: 137 MMOL/L (ref 133–144)
SODIUM SERPL-SCNC: 138 MMOL/L (ref 133–144)
SPECIMEN SOURCE: ABNORMAL
TACROLIMUS BLD-MCNC: 8.4 UG/L (ref 5–15)
TME LAST DOSE: NORMAL H
TRANSFUSION STATUS PATIENT QL: NORMAL
TRANSFUSION STATUS PATIENT QL: NORMAL
V CHOL+PARA RFBL+TRKH+TNAA STL QL NAA+PR: NOT DETECTED
WBC # BLD AUTO: 4.7 10E9/L (ref 4–11)
WBC # BLD AUTO: 4.8 10E9/L (ref 4–11)
WBC # BLD AUTO: 4.9 10E9/L (ref 4–11)
Y ENTERO RECN STL QL NAA+PROBE: NOT DETECTED

## 2018-08-12 PROCEDURE — 87328 CRYPTOSPORIDIUM AG IA: CPT | Performed by: STUDENT IN AN ORGANIZED HEALTH CARE EDUCATION/TRAINING PROGRAM

## 2018-08-12 PROCEDURE — 25000125 ZZHC RX 250: Performed by: STUDENT IN AN ORGANIZED HEALTH CARE EDUCATION/TRAINING PROGRAM

## 2018-08-12 PROCEDURE — 99223 1ST HOSP IP/OBS HIGH 75: CPT | Mod: GC | Performed by: INTERNAL MEDICINE

## 2018-08-12 PROCEDURE — 87493 C DIFF AMPLIFIED PROBE: CPT | Performed by: STUDENT IN AN ORGANIZED HEALTH CARE EDUCATION/TRAINING PROGRAM

## 2018-08-12 PROCEDURE — 25800025 ZZH RX 258: Performed by: STUDENT IN AN ORGANIZED HEALTH CARE EDUCATION/TRAINING PROGRAM

## 2018-08-12 PROCEDURE — 25800025 ZZH RX 258: Performed by: EMERGENCY MEDICINE

## 2018-08-12 PROCEDURE — 80197 ASSAY OF TACROLIMUS: CPT | Performed by: STUDENT IN AN ORGANIZED HEALTH CARE EDUCATION/TRAINING PROGRAM

## 2018-08-12 PROCEDURE — 85610 PROTHROMBIN TIME: CPT | Performed by: EMERGENCY MEDICINE

## 2018-08-12 PROCEDURE — A9270 NON-COVERED ITEM OR SERVICE: HCPCS | Mod: GY | Performed by: INTERNAL MEDICINE

## 2018-08-12 PROCEDURE — 25000128 H RX IP 250 OP 636: Performed by: EMERGENCY MEDICINE

## 2018-08-12 PROCEDURE — 87506 IADNA-DNA/RNA PROBE TQ 6-11: CPT | Performed by: STUDENT IN AN ORGANIZED HEALTH CARE EDUCATION/TRAINING PROGRAM

## 2018-08-12 PROCEDURE — 37000008 ZZH ANESTHESIA TECHNICAL FEE, 1ST 30 MIN: Performed by: COLON & RECTAL SURGERY

## 2018-08-12 PROCEDURE — 87040 BLOOD CULTURE FOR BACTERIA: CPT | Performed by: STUDENT IN AN ORGANIZED HEALTH CARE EDUCATION/TRAINING PROGRAM

## 2018-08-12 PROCEDURE — 83605 ASSAY OF LACTIC ACID: CPT | Performed by: EMERGENCY MEDICINE

## 2018-08-12 PROCEDURE — 96365 THER/PROPH/DIAG IV INF INIT: CPT | Performed by: EMERGENCY MEDICINE

## 2018-08-12 PROCEDURE — 99284 EMERGENCY DEPT VISIT MOD MDM: CPT | Mod: Z6 | Performed by: EMERGENCY MEDICINE

## 2018-08-12 PROCEDURE — 25000125 ZZHC RX 250: Performed by: INTERNAL MEDICINE

## 2018-08-12 PROCEDURE — 36415 COLL VENOUS BLD VENIPUNCTURE: CPT | Performed by: STUDENT IN AN ORGANIZED HEALTH CARE EDUCATION/TRAINING PROGRAM

## 2018-08-12 PROCEDURE — 25000128 H RX IP 250 OP 636: Performed by: STUDENT IN AN ORGANIZED HEALTH CARE EDUCATION/TRAINING PROGRAM

## 2018-08-12 PROCEDURE — 00000146 ZZHCL STATISTIC GLUCOSE BY METER IP

## 2018-08-12 PROCEDURE — 85610 PROTHROMBIN TIME: CPT | Performed by: INTERNAL MEDICINE

## 2018-08-12 PROCEDURE — 25800025 ZZH RX 258

## 2018-08-12 PROCEDURE — 36000051 ZZH SURGERY LEVEL 2 1ST 30 MIN - UMMC: Performed by: COLON & RECTAL SURGERY

## 2018-08-12 PROCEDURE — 21400003 ZZH R&B CCU CRITICAL UMMC

## 2018-08-12 PROCEDURE — 85384 FIBRINOGEN ACTIVITY: CPT | Performed by: INTERNAL MEDICINE

## 2018-08-12 PROCEDURE — 0D9P0ZZ DRAINAGE OF RECTUM, OPEN APPROACH: ICD-10-PCS | Performed by: COLON & RECTAL SURGERY

## 2018-08-12 PROCEDURE — 87040 BLOOD CULTURE FOR BACTERIA: CPT | Performed by: EMERGENCY MEDICINE

## 2018-08-12 PROCEDURE — 84100 ASSAY OF PHOSPHORUS: CPT | Performed by: INTERNAL MEDICINE

## 2018-08-12 PROCEDURE — 87329 GIARDIA AG IA: CPT | Performed by: STUDENT IN AN ORGANIZED HEALTH CARE EDUCATION/TRAINING PROGRAM

## 2018-08-12 PROCEDURE — 86923 COMPATIBILITY TEST ELECTRIC: CPT | Performed by: EMERGENCY MEDICINE

## 2018-08-12 PROCEDURE — 85652 RBC SED RATE AUTOMATED: CPT | Performed by: EMERGENCY MEDICINE

## 2018-08-12 PROCEDURE — 86900 BLOOD TYPING SEROLOGIC ABO: CPT | Performed by: EMERGENCY MEDICINE

## 2018-08-12 PROCEDURE — 37000009 ZZH ANESTHESIA TECHNICAL FEE, EACH ADDTL 15 MIN: Performed by: COLON & RECTAL SURGERY

## 2018-08-12 PROCEDURE — 86140 C-REACTIVE PROTEIN: CPT | Performed by: EMERGENCY MEDICINE

## 2018-08-12 PROCEDURE — 85025 COMPLETE CBC W/AUTO DIFF WBC: CPT | Performed by: EMERGENCY MEDICINE

## 2018-08-12 PROCEDURE — 85027 COMPLETE CBC AUTOMATED: CPT | Performed by: INTERNAL MEDICINE

## 2018-08-12 PROCEDURE — 25000131 ZZH RX MED GY IP 250 OP 636 PS 637: Mod: GY | Performed by: STUDENT IN AN ORGANIZED HEALTH CARE EDUCATION/TRAINING PROGRAM

## 2018-08-12 PROCEDURE — 40000170 ZZH STATISTIC PRE-PROCEDURE ASSESSMENT II: Performed by: COLON & RECTAL SURGERY

## 2018-08-12 PROCEDURE — 36000053 ZZH SURGERY LEVEL 2 EA 15 ADDTL MIN - UMMC: Performed by: COLON & RECTAL SURGERY

## 2018-08-12 PROCEDURE — 36415 COLL VENOUS BLD VENIPUNCTURE: CPT | Performed by: INTERNAL MEDICINE

## 2018-08-12 PROCEDURE — 25000128 H RX IP 250 OP 636: Performed by: ANESTHESIOLOGY

## 2018-08-12 PROCEDURE — 85520 HEPARIN ASSAY: CPT | Performed by: STUDENT IN AN ORGANIZED HEALTH CARE EDUCATION/TRAINING PROGRAM

## 2018-08-12 PROCEDURE — 25000131 ZZH RX MED GY IP 250 OP 636 PS 637: Mod: GY | Performed by: INTERNAL MEDICINE

## 2018-08-12 PROCEDURE — 74177 CT ABD & PELVIS W/CONTRAST: CPT

## 2018-08-12 PROCEDURE — 80048 BASIC METABOLIC PNL TOTAL CA: CPT | Performed by: EMERGENCY MEDICINE

## 2018-08-12 PROCEDURE — 85730 THROMBOPLASTIN TIME PARTIAL: CPT | Performed by: INTERNAL MEDICINE

## 2018-08-12 PROCEDURE — 71000014 ZZH RECOVERY PHASE 1 LEVEL 2 FIRST HR: Performed by: COLON & RECTAL SURGERY

## 2018-08-12 PROCEDURE — 86645 CMV ANTIBODY IGM: CPT | Performed by: INTERNAL MEDICINE

## 2018-08-12 PROCEDURE — 71000015 ZZH RECOVERY PHASE 1 LEVEL 2 EA ADDTL HR: Performed by: COLON & RECTAL SURGERY

## 2018-08-12 PROCEDURE — 25000128 H RX IP 250 OP 636

## 2018-08-12 PROCEDURE — 25000128 H RX IP 250 OP 636: Performed by: COLON & RECTAL SURGERY

## 2018-08-12 PROCEDURE — 96375 TX/PRO/DX INJ NEW DRUG ADDON: CPT | Performed by: EMERGENCY MEDICINE

## 2018-08-12 PROCEDURE — 86644 CMV ANTIBODY: CPT | Performed by: INTERNAL MEDICINE

## 2018-08-12 PROCEDURE — 86850 RBC ANTIBODY SCREEN: CPT | Performed by: EMERGENCY MEDICINE

## 2018-08-12 PROCEDURE — 25000132 ZZH RX MED GY IP 250 OP 250 PS 637: Mod: GY | Performed by: INTERNAL MEDICINE

## 2018-08-12 PROCEDURE — P9016 RBC LEUKOCYTES REDUCED: HCPCS | Performed by: EMERGENCY MEDICINE

## 2018-08-12 PROCEDURE — 25000128 H RX IP 250 OP 636: Performed by: NURSE ANESTHETIST, CERTIFIED REGISTERED

## 2018-08-12 PROCEDURE — 93971 EXTREMITY STUDY: CPT | Mod: RT

## 2018-08-12 PROCEDURE — 25000125 ZZHC RX 250: Performed by: COLON & RECTAL SURGERY

## 2018-08-12 PROCEDURE — 27210794 ZZH OR GENERAL SUPPLY STERILE: Performed by: COLON & RECTAL SURGERY

## 2018-08-12 PROCEDURE — 83735 ASSAY OF MAGNESIUM: CPT | Performed by: INTERNAL MEDICINE

## 2018-08-12 PROCEDURE — 25000128 H RX IP 250 OP 636: Performed by: INTERNAL MEDICINE

## 2018-08-12 PROCEDURE — 87207 SMEAR SPECIAL STAIN: CPT | Performed by: STUDENT IN AN ORGANIZED HEALTH CARE EDUCATION/TRAINING PROGRAM

## 2018-08-12 PROCEDURE — 99285 EMERGENCY DEPT VISIT HI MDM: CPT | Mod: 25 | Performed by: EMERGENCY MEDICINE

## 2018-08-12 PROCEDURE — 86901 BLOOD TYPING SEROLOGIC RH(D): CPT | Performed by: EMERGENCY MEDICINE

## 2018-08-12 PROCEDURE — 80048 BASIC METABOLIC PNL TOTAL CA: CPT | Performed by: INTERNAL MEDICINE

## 2018-08-12 PROCEDURE — 85018 HEMOGLOBIN: CPT | Performed by: STUDENT IN AN ORGANIZED HEALTH CARE EDUCATION/TRAINING PROGRAM

## 2018-08-12 PROCEDURE — 87070 CULTURE OTHR SPECIMN AEROBIC: CPT | Performed by: STUDENT IN AN ORGANIZED HEALTH CARE EDUCATION/TRAINING PROGRAM

## 2018-08-12 RX ORDER — LIDOCAINE 40 MG/G
CREAM TOPICAL
Status: DISCONTINUED | OUTPATIENT
Start: 2018-08-12 | End: 2018-08-12

## 2018-08-12 RX ORDER — TRIAMCINOLONE ACETONIDE 1 MG/G
OINTMENT TOPICAL 2 TIMES DAILY
Status: DISCONTINUED | OUTPATIENT
Start: 2018-08-12 | End: 2018-09-11 | Stop reason: HOSPADM

## 2018-08-12 RX ORDER — SODIUM CHLORIDE, SODIUM LACTATE, POTASSIUM CHLORIDE, CALCIUM CHLORIDE 600; 310; 30; 20 MG/100ML; MG/100ML; MG/100ML; MG/100ML
INJECTION, SOLUTION INTRAVENOUS CONTINUOUS
Status: DISCONTINUED | OUTPATIENT
Start: 2018-08-12 | End: 2018-08-13

## 2018-08-12 RX ORDER — ROSUVASTATIN CALCIUM 20 MG/1
20 TABLET, COATED ORAL DAILY
Status: DISCONTINUED | OUTPATIENT
Start: 2018-08-12 | End: 2018-08-18

## 2018-08-12 RX ORDER — NALOXONE HYDROCHLORIDE 0.4 MG/ML
.1-.4 INJECTION, SOLUTION INTRAMUSCULAR; INTRAVENOUS; SUBCUTANEOUS
Status: ACTIVE | OUTPATIENT
Start: 2018-08-12 | End: 2018-08-13

## 2018-08-12 RX ORDER — PANTOPRAZOLE SODIUM 40 MG/1
40 TABLET, DELAYED RELEASE ORAL EVERY MORNING
Status: DISCONTINUED | OUTPATIENT
Start: 2018-08-12 | End: 2018-08-14

## 2018-08-12 RX ORDER — NALOXONE HYDROCHLORIDE 0.4 MG/ML
.1-.4 INJECTION, SOLUTION INTRAMUSCULAR; INTRAVENOUS; SUBCUTANEOUS
Status: DISCONTINUED | OUTPATIENT
Start: 2018-08-12 | End: 2018-09-11 | Stop reason: HOSPADM

## 2018-08-12 RX ORDER — BIOTIN 1 MG
5 TABLET ORAL DAILY
Status: DISCONTINUED | OUTPATIENT
Start: 2018-08-12 | End: 2018-08-16

## 2018-08-12 RX ORDER — DEXTROSE MONOHYDRATE 25 G/50ML
INJECTION, SOLUTION INTRAVENOUS
Status: COMPLETED
Start: 2018-08-12 | End: 2018-08-12

## 2018-08-12 RX ORDER — PROPOFOL 10 MG/ML
INJECTION, EMULSION INTRAVENOUS CONTINUOUS PRN
Status: DISCONTINUED | OUTPATIENT
Start: 2018-08-12 | End: 2018-08-12

## 2018-08-12 RX ORDER — LIDOCAINE 40 MG/G
CREAM TOPICAL
Status: DISCONTINUED | OUTPATIENT
Start: 2018-08-12 | End: 2018-09-11 | Stop reason: HOSPADM

## 2018-08-12 RX ORDER — SODIUM CHLORIDE 9 MG/ML
INJECTION, SOLUTION INTRAVENOUS CONTINUOUS PRN
Status: DISCONTINUED | OUTPATIENT
Start: 2018-08-12 | End: 2018-08-12

## 2018-08-12 RX ORDER — PROCHLORPERAZINE MALEATE 5 MG
10 TABLET ORAL EVERY 6 HOURS PRN
Status: DISCONTINUED | OUTPATIENT
Start: 2018-08-12 | End: 2018-08-20

## 2018-08-12 RX ORDER — TRAMADOL HYDROCHLORIDE 50 MG/1
50 TABLET ORAL EVERY 12 HOURS PRN
Status: DISCONTINUED | OUTPATIENT
Start: 2018-08-12 | End: 2018-08-15

## 2018-08-12 RX ORDER — HYDRALAZINE HYDROCHLORIDE 20 MG/ML
10 INJECTION INTRAMUSCULAR; INTRAVENOUS ONCE
Status: COMPLETED | OUTPATIENT
Start: 2018-08-12 | End: 2018-08-12

## 2018-08-12 RX ORDER — TRAMADOL HYDROCHLORIDE 50 MG/1
50 TABLET ORAL EVERY 6 HOURS PRN
Status: DISCONTINUED | OUTPATIENT
Start: 2018-08-12 | End: 2018-08-12

## 2018-08-12 RX ORDER — NICOTINE POLACRILEX 4 MG
15-30 LOZENGE BUCCAL
Status: DISCONTINUED | OUTPATIENT
Start: 2018-08-12 | End: 2018-09-11 | Stop reason: HOSPADM

## 2018-08-12 RX ORDER — TACROLIMUS 1 MG/1
1 CAPSULE ORAL EVERY MORNING
Status: DISCONTINUED | OUTPATIENT
Start: 2018-08-12 | End: 2018-08-16

## 2018-08-12 RX ORDER — PIPERACILLIN SODIUM, TAZOBACTAM SODIUM 4; .5 G/20ML; G/20ML
4.5 INJECTION, POWDER, LYOPHILIZED, FOR SOLUTION INTRAVENOUS EVERY 6 HOURS
Status: DISCONTINUED | OUTPATIENT
Start: 2018-08-12 | End: 2018-08-12

## 2018-08-12 RX ORDER — HYDROMORPHONE HYDROCHLORIDE 1 MG/ML
.3-.5 INJECTION, SOLUTION INTRAMUSCULAR; INTRAVENOUS; SUBCUTANEOUS EVERY 5 MIN PRN
Status: DISCONTINUED | OUTPATIENT
Start: 2018-08-12 | End: 2018-08-12 | Stop reason: HOSPADM

## 2018-08-12 RX ORDER — IOPAMIDOL 755 MG/ML
101 INJECTION, SOLUTION INTRAVASCULAR ONCE
Status: COMPLETED | OUTPATIENT
Start: 2018-08-12 | End: 2018-08-12

## 2018-08-12 RX ORDER — HYDROMORPHONE HYDROCHLORIDE 1 MG/ML
.2-.4 INJECTION, SOLUTION INTRAMUSCULAR; INTRAVENOUS; SUBCUTANEOUS
Status: DISCONTINUED | OUTPATIENT
Start: 2018-08-12 | End: 2018-08-13

## 2018-08-12 RX ORDER — FENTANYL CITRATE 50 UG/ML
INJECTION, SOLUTION INTRAMUSCULAR; INTRAVENOUS PRN
Status: DISCONTINUED | OUTPATIENT
Start: 2018-08-12 | End: 2018-08-12

## 2018-08-12 RX ORDER — MAGNESIUM SULFATE HEPTAHYDRATE 40 MG/ML
4 INJECTION, SOLUTION INTRAVENOUS ONCE
Status: COMPLETED | OUTPATIENT
Start: 2018-08-12 | End: 2018-08-12

## 2018-08-12 RX ORDER — TACROLIMUS 1 MG/1
1 CAPSULE ORAL 2 TIMES DAILY
Status: DISCONTINUED | OUTPATIENT
Start: 2018-08-12 | End: 2018-08-12

## 2018-08-12 RX ORDER — FENTANYL CITRATE 50 UG/ML
25-50 INJECTION, SOLUTION INTRAMUSCULAR; INTRAVENOUS
Status: DISCONTINUED | OUTPATIENT
Start: 2018-08-12 | End: 2018-08-12 | Stop reason: HOSPADM

## 2018-08-12 RX ORDER — HEPARIN SODIUM 1000 [USP'U]/ML
3000 INJECTION, SOLUTION INTRAVENOUS; SUBCUTANEOUS ONCE
Status: DISCONTINUED | OUTPATIENT
Start: 2018-08-12 | End: 2018-08-12

## 2018-08-12 RX ORDER — TACROLIMUS 1 MG/1
2 CAPSULE ORAL EVERY EVENING
Status: DISCONTINUED | OUTPATIENT
Start: 2018-08-12 | End: 2018-08-16

## 2018-08-12 RX ORDER — CEFEPIME HYDROCHLORIDE 1 G/1
1 INJECTION, POWDER, FOR SOLUTION INTRAMUSCULAR; INTRAVENOUS ONCE
Status: COMPLETED | OUTPATIENT
Start: 2018-08-12 | End: 2018-08-12

## 2018-08-12 RX ORDER — ONDANSETRON 4 MG/1
4 TABLET, ORALLY DISINTEGRATING ORAL EVERY 30 MIN PRN
Status: DISCONTINUED | OUTPATIENT
Start: 2018-08-12 | End: 2018-08-12 | Stop reason: HOSPADM

## 2018-08-12 RX ORDER — ALBUTEROL SULFATE 90 UG/1
6 AEROSOL, METERED RESPIRATORY (INHALATION) EVERY 4 HOURS PRN
Status: DISCONTINUED | OUTPATIENT
Start: 2018-08-12 | End: 2018-09-11 | Stop reason: HOSPADM

## 2018-08-12 RX ORDER — CEFEPIME HYDROCHLORIDE 1 G/1
1 INJECTION, POWDER, FOR SOLUTION INTRAMUSCULAR; INTRAVENOUS EVERY 24 HOURS
Status: DISCONTINUED | OUTPATIENT
Start: 2018-08-13 | End: 2018-08-13

## 2018-08-12 RX ORDER — HEPARIN SODIUM 1000 [USP'U]/ML
3000 INJECTION, SOLUTION INTRAVENOUS; SUBCUTANEOUS ONCE
Status: COMPLETED | OUTPATIENT
Start: 2018-08-12 | End: 2018-08-12

## 2018-08-12 RX ORDER — PREDNISONE 10 MG/1
10 TABLET ORAL 2 TIMES DAILY
Status: DISCONTINUED | OUTPATIENT
Start: 2018-08-12 | End: 2018-08-18

## 2018-08-12 RX ORDER — SODIUM CHLORIDE, SODIUM LACTATE, POTASSIUM CHLORIDE, CALCIUM CHLORIDE 600; 310; 30; 20 MG/100ML; MG/100ML; MG/100ML; MG/100ML
INJECTION, SOLUTION INTRAVENOUS CONTINUOUS
Status: DISCONTINUED | OUTPATIENT
Start: 2018-08-12 | End: 2018-08-12 | Stop reason: HOSPADM

## 2018-08-12 RX ORDER — FLUTICASONE PROPIONATE 50 MCG
1-2 SPRAY, SUSPENSION (ML) NASAL DAILY
Status: DISCONTINUED | OUTPATIENT
Start: 2018-08-12 | End: 2018-09-11 | Stop reason: HOSPADM

## 2018-08-12 RX ORDER — BUPIVACAINE HYDROCHLORIDE 2.5 MG/ML
INJECTION, SOLUTION EPIDURAL; INFILTRATION; INTRACAUDAL PRN
Status: DISCONTINUED | OUTPATIENT
Start: 2018-08-12 | End: 2018-08-12 | Stop reason: HOSPADM

## 2018-08-12 RX ORDER — METRONIDAZOLE 500 MG/100ML
1000 INJECTION, SOLUTION INTRAVENOUS EVERY 6 HOURS
Status: DISCONTINUED | OUTPATIENT
Start: 2018-08-12 | End: 2018-08-12

## 2018-08-12 RX ORDER — DEXTROSE MONOHYDRATE 25 G/50ML
25 INJECTION, SOLUTION INTRAVENOUS ONCE
Status: COMPLETED | OUTPATIENT
Start: 2018-08-12 | End: 2018-08-12

## 2018-08-12 RX ORDER — ONDANSETRON 2 MG/ML
4 INJECTION INTRAMUSCULAR; INTRAVENOUS EVERY 30 MIN PRN
Status: DISCONTINUED | OUTPATIENT
Start: 2018-08-12 | End: 2018-08-12 | Stop reason: HOSPADM

## 2018-08-12 RX ORDER — DEXTROSE MONOHYDRATE 25 G/50ML
25-50 INJECTION, SOLUTION INTRAVENOUS
Status: DISCONTINUED | OUTPATIENT
Start: 2018-08-12 | End: 2018-09-11 | Stop reason: HOSPADM

## 2018-08-12 RX ORDER — NYSTATIN 100000/ML
1000000 SUSPENSION, ORAL (FINAL DOSE FORM) ORAL 4 TIMES DAILY
Status: DISCONTINUED | OUTPATIENT
Start: 2018-08-12 | End: 2018-09-11 | Stop reason: HOSPADM

## 2018-08-12 RX ORDER — MYCOPHENOLATE MOFETIL 250 MG/1
500 CAPSULE ORAL
Status: DISCONTINUED | OUTPATIENT
Start: 2018-08-12 | End: 2018-08-18

## 2018-08-12 RX ORDER — PROPOFOL 10 MG/ML
INJECTION, EMULSION INTRAVENOUS PRN
Status: DISCONTINUED | OUTPATIENT
Start: 2018-08-12 | End: 2018-08-12

## 2018-08-12 RX ORDER — PROCHLORPERAZINE 25 MG
25 SUPPOSITORY, RECTAL RECTAL EVERY 12 HOURS PRN
Status: DISCONTINUED | OUTPATIENT
Start: 2018-08-12 | End: 2018-08-12

## 2018-08-12 RX ORDER — ACETAMINOPHEN 500 MG
1000 TABLET ORAL 4 TIMES DAILY
Status: DISCONTINUED | OUTPATIENT
Start: 2018-08-12 | End: 2018-08-28

## 2018-08-12 RX ORDER — ONDANSETRON 4 MG/1
4 TABLET, ORALLY DISINTEGRATING ORAL EVERY 6 HOURS PRN
Status: DISCONTINUED | OUTPATIENT
Start: 2018-08-12 | End: 2018-08-20

## 2018-08-12 RX ORDER — ONDANSETRON 2 MG/ML
4 INJECTION INTRAMUSCULAR; INTRAVENOUS EVERY 6 HOURS PRN
Status: DISCONTINUED | OUTPATIENT
Start: 2018-08-12 | End: 2018-09-11 | Stop reason: HOSPADM

## 2018-08-12 RX ADMIN — CEFEPIME HYDROCHLORIDE 1 G: 1 INJECTION, POWDER, FOR SOLUTION INTRAMUSCULAR; INTRAVENOUS at 03:43

## 2018-08-12 RX ADMIN — Medication 0.2 MG: at 21:45

## 2018-08-12 RX ADMIN — NYSTATIN 1000000 UNITS: 100000 SUSPENSION ORAL at 12:30

## 2018-08-12 RX ADMIN — HYDRALAZINE HYDROCHLORIDE 10 MG: 20 INJECTION INTRAMUSCULAR; INTRAVENOUS at 21:59

## 2018-08-12 RX ADMIN — FENTANYL CITRATE 50 MCG: 50 INJECTION, SOLUTION INTRAMUSCULAR; INTRAVENOUS at 21:15

## 2018-08-12 RX ADMIN — METRONIDAZOLE 500 MG: 500 INJECTION, SOLUTION INTRAVENOUS at 12:31

## 2018-08-12 RX ADMIN — SODIUM CHLORIDE: 9 INJECTION, SOLUTION INTRAVENOUS at 19:58

## 2018-08-12 RX ADMIN — PROPOFOL 100 MCG/KG/MIN: 10 INJECTION, EMULSION INTRAVENOUS at 20:08

## 2018-08-12 RX ADMIN — DEXTROSE MONOHYDRATE 25 ML: 25 INJECTION, SOLUTION INTRAVENOUS at 04:29

## 2018-08-12 RX ADMIN — Medication 1 CAPSULE: at 08:09

## 2018-08-12 RX ADMIN — HEPARIN SODIUM 3000 UNITS: 1000 INJECTION INTRAVENOUS; SUBCUTANEOUS at 13:47

## 2018-08-12 RX ADMIN — FENTANYL CITRATE 50 MCG: 50 INJECTION, SOLUTION INTRAMUSCULAR; INTRAVENOUS at 20:08

## 2018-08-12 RX ADMIN — NYSTATIN 1000000 UNITS: 100000 SUSPENSION ORAL at 18:01

## 2018-08-12 RX ADMIN — PROPOFOL 30 MG: 10 INJECTION, EMULSION INTRAVENOUS at 20:08

## 2018-08-12 RX ADMIN — Medication 0.3 MG: at 21:25

## 2018-08-12 RX ADMIN — PREDNISONE 15 MG: 5 TABLET ORAL at 08:09

## 2018-08-12 RX ADMIN — FENTANYL CITRATE 25 MCG: 50 INJECTION, SOLUTION INTRAMUSCULAR; INTRAVENOUS at 20:27

## 2018-08-12 RX ADMIN — SODIUM CHLORIDE 250 ML: 9 INJECTION, SOLUTION INTRAVENOUS at 03:43

## 2018-08-12 RX ADMIN — DEXTROSE MONOHYDRATE 25 ML: 25 INJECTION, SOLUTION INTRAVENOUS at 03:07

## 2018-08-12 RX ADMIN — PROPOFOL 10 MG: 10 INJECTION, EMULSION INTRAVENOUS at 20:09

## 2018-08-12 RX ADMIN — DEXTROSE MONOHYDRATE 25 ML: 500 INJECTION PARENTERAL at 12:43

## 2018-08-12 RX ADMIN — MAGNESIUM SULFATE IN WATER 4 G: 40 INJECTION, SOLUTION INTRAVENOUS at 10:35

## 2018-08-12 RX ADMIN — ROSUVASTATIN CALCIUM 20 MG: 20 TABLET, FILM COATED ORAL at 08:09

## 2018-08-12 RX ADMIN — Medication 5 MG: at 08:10

## 2018-08-12 RX ADMIN — SODIUM CHLORIDE 250 ML: 9 INJECTION, SOLUTION INTRAVENOUS at 03:14

## 2018-08-12 RX ADMIN — FLUTICASONE PROPIONATE 1 SPRAY: 50 SPRAY, METERED NASAL at 08:10

## 2018-08-12 RX ADMIN — MYCOPHENOLATE MOFETIL 500 MG: 250 CAPSULE ORAL at 18:01

## 2018-08-12 RX ADMIN — MYCOPHENOLATE MOFETIL 500 MG: 250 CAPSULE ORAL at 08:09

## 2018-08-12 RX ADMIN — NYSTATIN 1000000 UNITS: 100000 SUSPENSION ORAL at 08:09

## 2018-08-12 RX ADMIN — TRIAMCINOLONE ACETONIDE: 1 OINTMENT TOPICAL at 08:10

## 2018-08-12 RX ADMIN — TACROLIMUS 2 MG: 1 CAPSULE ORAL at 18:01

## 2018-08-12 RX ADMIN — IOPAMIDOL 101 ML: 755 INJECTION, SOLUTION INTRAVENOUS at 08:52

## 2018-08-12 RX ADMIN — TACROLIMUS 1 MG: 1 CAPSULE ORAL at 08:09

## 2018-08-12 RX ADMIN — PANTOPRAZOLE SODIUM 40 MG: 40 TABLET, DELAYED RELEASE ORAL at 08:09

## 2018-08-12 ASSESSMENT — ACTIVITIES OF DAILY LIVING (ADL)
ADLS_ACUITY_SCORE: 11

## 2018-08-12 ASSESSMENT — ENCOUNTER SYMPTOMS
FEVER: 1
CARDIOVASCULAR NEGATIVE: 1
CHILLS: 1
DIAPHORESIS: 1
MUSCULOSKELETAL NEGATIVE: 1
RESPIRATORY NEGATIVE: 1
BLOOD IN STOOL: 1
FATIGUE: 1
APPETITE CHANGE: 1
ACTIVITY CHANGE: 1

## 2018-08-12 NOTE — ED TRIAGE NOTES
Pt BIBA with complaints of bloody stools x3 yesterday and tonight. Pt had heart transplant in June and was recently hospitalized for sepsis. Pt reports fever tonight and is feeling weak as well.MD at bedside.

## 2018-08-12 NOTE — PLAN OF CARE
Problem: Patient Care Overview  Goal: Plan of Care/Patient Progress Review  D: Pt stable and comfortable. Afebrile. SR. Pt reported decreased abdominal pain. No shortness of breath noted. NPO for OR this evening. Pt receiving 1 unit PRBCs at time of writing.  I: Monitored/assessed pt. Assisted with cares.  A: Pt stable and comfortable.  P: Continue to monitor/assess pt, contact provider with concerns.
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
## 2018-08-12 NOTE — IP AVS SNAPSHOT
"    [address] [facility]: [phone]                                              INTERAGENCY TRANSFER FORM - LAB / IMAGING / EKG / EMG RESULTS   2018                    Hospital Admission Date: 2018  SANCHEZ MCNEIL   : 1955  Sex: Male        Attending Provider: Arcelia Blum MD     Allergies:  Norco [Hydrocodone-acetaminophen], Cats, Isosorbide, Penicillins, Seasonal Allergies, Shrimp    Infection:  VRE   Service:  CARDIOLOGY    Ht:  1.753 m (5' 9\")   Wt:  80 kg (176 lb 6.4 oz)   Admission Wt:  75 kg (165 lb 5.5 oz)    BMI:  26.05 kg/m 2   BSA:  1.97 m 2            Patient PCP Information     Provider PCP Type    Yahir Turcios MD General         Lab Results - 3 Days      CBC with platelets differential [[id number]] (Abnormal)  Resulted: 18 1453, Result status: Final result    Ordering provider: Kristi Ayon NP  18 0816 Resulting lab: [facility]    Specimen Information    Type Source Collected On   Blood  18 1342          Components       Value Reference Range Flag Lab   WBC 1.2 4.0 - 11.0 10e9/L L 51   RBC Count 2.57 4.4 - 5.9 10e12/L L 51   Hemoglobin 8.2 13.3 - 17.7 g/dL L 51   Hematocrit 26.5 40.0 - 53.0 % L 51    78 - 100 fl H 51   MCH 31.9 26.5 - 33.0 pg  51   MCHC 30.9 31.5 - 36.5 g/dL L 51   RDW 22.1 10.0 - 15.0 % H 51   Platelet Count 179 150 - 450 10e9/L  51   Diff Method Automated Method   51   % Neutrophils 72.1 %  51   % Lymphocytes 19.5 %  51   % Monocytes 5.9 %  51   % Eosinophils 2.5 %  51   % Basophils 0.0 %  51   % Immature Granulocytes 0.0 %  51   Nucleated RBCs 0 0 /100  51   Absolute Neutrophil 0.9 1.6 - 8.3 10e9/L L 51   Absolute Lymphocytes 0.2 0.8 - 5.3 10e9/L L 51   Absolute Monocytes 0.1 0.0 - 1.3 10e9/L  51   Absolute Eosinophils 0.0 0.0 - 0.7 10e9/L  51   Absolute Basophils 0.0 0.0 - 0.2 10e9/L  51   Abs Immature Granulocytes 0.0 0 - 0.4 10e9/L  51   Absolute Nucleated RBC 0.0   51          "   Glucose by meter [[id number]] (Abnormal)  Resulted: 09/11/18 1218, Result status: Final result    Ordering provider: Arcelia Blum MD  09/11/18 1207 Resulting lab: POINT OF CARE TEST, GLUCOSE    Specimen Information    Type Source Collected On     09/11/18 1207          Components       Value Reference Range Flag Lab   Glucose 156 70 - 99 mg/dL H 170            Glucose by meter [[id number]]  Resulted: 09/11/18 0752, Result status: Final result    Ordering provider: Arcelia Blum MD  09/11/18 0741 Resulting lab: POINT OF CARE TEST, GLUCOSE    Specimen Information    Type Source Collected On     09/11/18 0741          Components       Value Reference Range Flag Lab   Glucose 70 70 - 99 mg/dL  170            Glucose by meter [[id number]] (Abnormal)  Resulted: 09/10/18 2145, Result status: Final result    Ordering provider: Arcelia Blum MD  09/10/18 2134 Resulting lab: POINT OF CARE TEST, GLUCOSE    Specimen Information    Type Source Collected On     09/10/18 2134          Components       Value Reference Range Flag Lab   Glucose 172 70 - 99 mg/dL H 170            Glucose by meter [[id number]] (Abnormal)  Resulted: 09/10/18 1842, Result status: Final result    Ordering provider: Arcelia Blmu MD  09/10/18 1828 Resulting lab: POINT OF CARE TEST, GLUCOSE    Specimen Information    Type Source Collected On     09/10/18 1828          Components       Value Reference Range Flag Lab   Glucose 160 70 - 99 mg/dL H 170            Tacrolimus level [[id number]]  Resulted: 09/10/18 1337, Result status: Final result    Ordering provider: Jennifer Dean APRN CNP  09/10/18 0000 Resulting lab: [facility]    Specimen Information    Type Source Collected On   Blood  09/10/18 0640          Components       Value Reference Range Flag Lab   Tacrolimus Last Dose Not Provided   51   Tacrolimus Level 5.8 5.0 - 15.0 ug/L  51   Comment:         Tacrolimus Reference  Range  Kidney Transplant  Pediatric                      ug/L    0-3 months post transplant   10-12    3-6 months post transplant   8-10    6-12 months post transplant  6-8    >12 months post transplant   4-7  Adult    0-6 months post transplant   8-10    6-12 months post transplant  6-8    >12 months post transplant   4-6    >5 years post transplant     3-5  Heart Transplant  Pediatric    0-12 months post transplant  10-15    >12 months post transplant   5-10  Adult    0-3 months post transplant   10-15    3-6 months post transplant   8-12    6-12 months post transplant  6-12    >12 months post transplant   6-10  Lung Transplant    0-12 months post transplant  10-15    >12 months post transplant   8-12  Liver Transplant  Pediatric    0-3 months post transplant   10-15    3-6 months post transplant   8-10    >6 months post transplant    6-8  Adult    0-3 months post transplant   10-12    3-6 months post transplant   8-10    >6 months post transplant    6-8  Pancrea  s Transplant    0-6 months post transplant   8-10    >6 months post transplant    5-8  This test was developed and its performance characteristics determined by the   [facility],  Special Chemistry Laboratory. It has   not been cleared or approved by the FDA. The laboratory is regulated under   CLIA as qualified to perform high-complexity testing. This test is used for   clinical purposes. It should not be regarded as investigational or for   research.              Glucose by meter [[id number]] (Abnormal)  Resulted: 09/10/18 1319, Result status: Final result    Ordering provider: Arcelia Blum MD  09/10/18 1305 Resulting lab: POINT OF CARE TEST, GLUCOSE    Specimen Information    Type Source Collected On     09/10/18 1305          Components       Value Reference Range Flag Lab   Glucose 119 70 - 99 mg/dL H 170            Prealbumin [[id number]]  Resulted: 09/10/18 0958, Result status: Final result    Ordering  provider: Arcelia Blum MD  09/09/18 2300 Resulting lab: [facility]    Specimen Information    Type Source Collected On   Blood  09/10/18 0640          Components       Value Reference Range Flag Lab   Prealbumin 24 15 - 45 mg/dL  51            ImmuKnow Immune Cell Function [[id number]]  Resulted: 09/10/18 0950, Result status: Final result    Ordering provider: Jennifer Dean APRN CNP  09/05/18 2300 Resulting lab: [facility]    Specimen Information    Type Source Collected On   Blood  09/06/18 0705          Components       Value Reference Range Flag Lab   Lab Scanned Result IMMUKNOW IMM CELL FUNC-Scanned               Comprehensive metabolic panel [[id number]] (Abnormal)  Resulted: 09/10/18 0723, Result status: Final result    Ordering provider: Arcelia Blum MD  09/09/18 2300 Resulting lab: [facility]    Specimen Information    Type Source Collected On   Blood  09/10/18 0640          Components       Value Reference Range Flag Lab   Sodium 137 133 - 144 mmol/L  51   Potassium 3.7 3.4 - 5.3 mmol/L  51   Chloride 100 94 - 109 mmol/L  51   Carbon Dioxide 29 20 - 32 mmol/L  51   Anion Gap 8 3 - 14 mmol/L  51   Glucose 108 70 - 99 mg/dL H 51   Urea Nitrogen 20 7 - 30 mg/dL  51   Creatinine 4.50 0.66 - 1.25 mg/dL H 51   GFR Estimate 13 >60 mL/min/1.7m2 L 51   Comment:  Non  GFR Calc   GFR Estimate If Black 16 >60 mL/min/1.7m2 L 51   Comment:  African American GFR Calc   Calcium 7.6 8.5 - 10.1 mg/dL L 51   Bilirubin Total 0.5 0.2 - 1.3 mg/dL  51   Albumin 2.0 3.4 - 5.0 g/dL L 51   Protein Total 5.0 6.8 - 8.8 g/dL L 51   Alkaline Phosphatase 141 40 - 150 U/L  51   ALT 14 0 - 70 U/L  51   AST 14 0 - 45 U/L  51            Magnesium [[id number]]  Resulted: 09/10/18 0723, Result status: Final result    Ordering provider: Arcelia Blum MD  09/09/18 2300 Resulting lab: [facility]     Specimen Information    Type Source Collected On   Blood  09/10/18 0640          Components       Value Reference Range Flag Lab   Magnesium 1.8 1.6 - 2.3 mg/dL  51            Phosphorus [[id number]] (Abnormal)  Resulted: 09/10/18 0723, Result status: Final result    Ordering provider: Arcelia Blum MD  09/09/18 2300 Resulting lab: [facility]    Specimen Information    Type Source Collected On   Blood  09/10/18 0640          Components       Value Reference Range Flag Lab   Phosphorus 2.4 2.5 - 4.5 mg/dL L 51            Triglycerides [[id number]]  Resulted: 09/10/18 0723, Result status: Final result    Ordering provider: Arcelia Blum MD  09/09/18 2300 Resulting lab: [facility]    Specimen Information    Type Source Collected On   Blood  09/10/18 0640          Components       Value Reference Range Flag Lab   Triglycerides 71 <150 mg/dL  51            INR [[id number]]  Resulted: 09/10/18 0719, Result status: Final result    Ordering provider: Arcelia Blum MD  09/09/18 2300 Resulting lab: [facility]    Specimen Information    Type Source Collected On   Blood  09/10/18 0640          Components       Value Reference Range Flag Lab   INR 1.05 0.86 - 1.14  51            CBC with platelets differential [[id number]] (Abnormal)  Resulted: 09/10/18 0658, Result status: Final result    Ordering provider: Jennifer Dean APRN CNP  09/09/18 2300 Resulting lab: [facility]    Specimen Information    Type Source Collected On   Blood  09/10/18 0640          Components       Value Reference Range Flag Lab   WBC 1.2 4.0 - 11.0 10e9/L L 51   RBC Count 2.48 4.4 - 5.9 10e12/L L 51   Hemoglobin 7.9 13.3 - 17.7 g/dL L 51   Hematocrit 25.0 40.0 - 53.0 % L 51    78 - 100 fl H 51   MCH 31.9 26.5 - 33.0 pg  51   MCHC 31.6 31.5 - 36.5 g/dL  51   RDW 21.8 10.0 - 15.0 % H 51    Platelet Count 160 150 - 450 10e9/L  51   Diff Method Automated Method   51   % Neutrophils 56.9 %  51   % Lymphocytes 28.8 %  51   % Monocytes 9.3 %  51   % Eosinophils 1.7 %  51   % Basophils 2.5 %  51   % Immature Granulocytes 0.8 %  51   Nucleated RBCs 0 0 /100  51   Absolute Neutrophil 0.7 1.6 - 8.3 10e9/L L 51   Absolute Lymphocytes 0.3 0.8 - 5.3 10e9/L L 51   Absolute Monocytes 0.1 0.0 - 1.3 10e9/L  51   Absolute Eosinophils 0.0 0.0 - 0.7 10e9/L  51   Absolute Basophils 0.0 0.0 - 0.2 10e9/L  51   Abs Immature Granulocytes 0.0 0 - 0.4 10e9/L  51   Absolute Nucleated RBC 0.0   51            Glucose by meter [[id number]] (Abnormal)  Resulted: 09/09/18 2228, Result status: Final result    Ordering provider: Arcelia Blum MD  09/09/18 2217 Resulting lab: POINT OF CARE TEST, GLUCOSE    Specimen Information    Type Source Collected On     09/09/18 2217          Components       Value Reference Range Flag Lab   Glucose 200 70 - 99 mg/dL H 170            Glucose by meter [[id number]] (Abnormal)  Resulted: 09/09/18 2019, Result status: Final result    Ordering provider: Arcelia Blum MD  09/09/18 2007 Resulting lab: POINT OF CARE TEST, GLUCOSE    Specimen Information    Type Source Collected On     09/09/18 2007          Components       Value Reference Range Flag Lab   Glucose 158 70 - 99 mg/dL H 170            Basic metabolic panel [[id number]] (Abnormal)  Resulted: 09/09/18 1542, Result status: Final result    Ordering provider: Jennifer Dean APRN CNP  09/08/18 2342 Resulting lab: [facility]    Specimen Information    Type Source Collected On   Blood  09/09/18 0636          Components       Value Reference Range Flag Lab   Sodium 135 133 - 144 mmol/L  51   Potassium 3.8 3.4 - 5.3 mmol/L  51   Chloride 98 94 - 109 mmol/L  51   Carbon Dioxide 29 20 - 32 mmol/L  51   Anion Gap 8 3 - 14 mmol/L  51   Glucose 78 70 - 99 mg/dL  51   Urea Nitrogen 12 7 - 30 mg/dL  51    Creatinine 3.35 0.66 - 1.25 mg/dL H 51   GFR Estimate 19 >60 mL/min/1.7m2 L 51   Comment:  Non  GFR Calc   GFR Estimate If Black 23 >60 mL/min/1.7m2 L 51   Comment:  African American GFR Calc   Calcium 7.9 8.5 - 10.1 mg/dL L 51            Glucose by meter [[id number]] (Abnormal)  Resulted: 09/09/18 1302, Result status: Final result    Ordering provider: Arcelia Blum MD  09/09/18 1251 Resulting lab: POINT OF CARE TEST, GLUCOSE    Specimen Information    Type Source Collected On     09/09/18 1251          Components       Value Reference Range Flag Lab   Glucose 149 70 - 99 mg/dL H 170            CBC with platelets differential [[id number]] (Abnormal)  Resulted: 09/09/18 1247, Result status: Final result    Ordering provider: Jennifer Dean APRN CNP  09/08/18 7942 Resulting lab: [facility]    Specimen Information    Type Source Collected On   Blood  09/09/18 0636          Components       Value Reference Range Flag Lab   WBC 1.0 4.0 - 11.0 10e9/L L 51   RBC Count 2.61 4.4 - 5.9 10e12/L L 51   Hemoglobin 8.3 13.3 - 17.7 g/dL L 51   Hematocrit 26.8 40.0 - 53.0 % L 51    78 - 100 fl H 51   MCH 31.8 26.5 - 33.0 pg  51   MCHC 31.0 31.5 - 36.5 g/dL L 51   RDW 21.9 10.0 - 15.0 % H 51   Platelet Count 167 150 - 450 10e9/L  51   Diff Method Automated Method   51   % Neutrophils 60.8 %  51   % Lymphocytes 27.8 %  51   % Monocytes 6.2 %  51   % Eosinophils 5.2 %  51   % Basophils 0.0 %  51   % Immature Granulocytes 0.0 %  51   Nucleated RBCs 0 0 /100  51   Absolute Neutrophil 0.6 1.6 - 8.3 10e9/L L 51   Absolute Lymphocytes 0.3 0.8 - 5.3 10e9/L L 51   Absolute Monocytes 0.1 0.0 - 1.3 10e9/L  51   Absolute Eosinophils 0.1 0.0 - 0.7 10e9/L  51   Absolute Basophils 0.0 0.0 - 0.2 10e9/L  51   Abs Immature Granulocytes 0.0 0 - 0.4 10e9/L  51   Absolute Nucleated RBC 0.0   51   Platelet Estimate Confirming automated cell count   51            Glucose by meter  [[id number]]  Resulted: 09/09/18 1152, Result status: Final result    Ordering provider: Arcelia Blum MD  09/09/18 0852 Resulting lab: POINT OF CARE TEST, GLUCOSE    Specimen Information    Type Source Collected On     09/09/18 0852          Components       Value Reference Range Flag Lab   Glucose 95 70 - 99 mg/dL  170            Glucose by meter [[id number]] (Abnormal)  Resulted: 09/09/18 1152, Result status: Final result    Ordering provider: Arcelia Blum MD  09/09/18 0820 Resulting lab: POINT OF CARE TEST, GLUCOSE    Specimen Information    Type Source Collected On     09/09/18 0820          Components       Value Reference Range Flag Lab   Glucose 61 70 - 99 mg/dL L 170            Glucose by meter [[id number]]  Resulted: 09/08/18 2216, Result status: Final result    Ordering provider: Arcelia Blum MD  09/08/18 2205 Resulting lab: POINT OF CARE TEST, GLUCOSE    Specimen Information    Type Source Collected On     09/08/18 2205          Components       Value Reference Range Flag Lab   Glucose 74 70 - 99 mg/dL  170            Glucose by meter [[id number]] (Abnormal)  Resulted: 09/08/18 1921, Result status: Final result    Ordering provider: Arcelia Blum MD  09/08/18 0956 Resulting lab: POINT OF CARE TEST, GLUCOSE    Specimen Information    Type Source Collected On     09/08/18 0956          Components       Value Reference Range Flag Lab   Glucose 123 70 - 99 mg/dL H 170            Glucose by meter [[id number]]  Resulted: 09/08/18 1905, Result status: Final result    Ordering provider: Arcelia Blum MD  09/08/18 1854 Resulting lab: POINT OF CARE TEST, GLUCOSE    Specimen Information    Type Source Collected On     09/08/18 1854          Components       Value Reference Range Flag Lab   Glucose 87 70 - 99 mg/dL  170   Comment:  /RN Notified            Glucose by meter [[id number]]  Resulted: 09/08/18 1532, Result status: Final result    Ordering provider: Kulwant  Arcelia Mata MD  09/08/18 1511 Resulting lab: POINT OF CARE TEST, GLUCOSE    Specimen Information    Type Source Collected On     09/08/18 1511          Components       Value Reference Range Flag Lab   Glucose 77 70 - 99 mg/dL  170            Basic metabolic panel [[id number]] (Abnormal)  Resulted: 09/08/18 0649, Result status: Final result    Ordering provider: Bobby Bearden MD  09/07/18 2300 Resulting lab: [facility]    Specimen Information    Type Source Collected On   Blood  09/08/18 0612          Components       Value Reference Range Flag Lab   Sodium 134 133 - 144 mmol/L  51   Potassium 3.8 3.4 - 5.3 mmol/L  51   Chloride 98 94 - 109 mmol/L  51   Carbon Dioxide 30 20 - 32 mmol/L  51   Anion Gap 7 3 - 14 mmol/L  51   Glucose 110 70 - 99 mg/dL H 51   Urea Nitrogen 20 7 - 30 mg/dL  51   Creatinine 4.61 0.66 - 1.25 mg/dL H 51   GFR Estimate 13 >60 mL/min/1.7m2 L 51   Comment:  Non  GFR Calc   GFR Estimate If Black 16 >60 mL/min/1.7m2 L 51   Comment:  African American GFR Calc   Calcium 8.0 8.5 - 10.1 mg/dL L 51            CBC with platelets differential [[id number]] (Abnormal)  Resulted: 09/08/18 0634, Result status: Final result    Ordering provider: Bobby Bearden MD  09/07/18 2300 Resulting lab: [facility]    Specimen Information    Type Source Collected On   Blood  09/08/18 0612          Components       Value Reference Range Flag Lab   WBC 1.0 4.0 - 11.0 10e9/L L 51   RBC Count 2.65 4.4 - 5.9 10e12/L L 51   Hemoglobin 8.3 13.3 - 17.7 g/dL L 51   Hematocrit 26.3 40.0 - 53.0 % L 51   MCV 99 78 - 100 fl  51   MCH 31.3 26.5 - 33.0 pg  51   MCHC 31.6 31.5 - 36.5 g/dL  51   RDW 21.5 10.0 - 15.0 % H 51   Platelet Count 139 150 - 450 10e9/L L 51   Diff Method Automated Method   51   % Neutrophils 63.4 %  51   % Lymphocytes 26.0 %  51   % Monocytes 5.8 %  51   % Eosinophils 4.8 %  51   % Basophils 0.0 %  51   % Immature  Granulocytes 0.0 %  51   Nucleated RBCs 0 0 /100  51   Absolute Neutrophil 0.7 1.6 - 8.3 10e9/L L 51   Absolute Lymphocytes 0.3 0.8 - 5.3 10e9/L L 51   Absolute Monocytes 0.1 0.0 - 1.3 10e9/L  51   Absolute Eosinophils 0.1 0.0 - 0.7 10e9/L  51   Absolute Basophils 0.0 0.0 - 0.2 10e9/L  51   Abs Immature Granulocytes 0.0 0 - 0.4 10e9/L  51   Absolute Nucleated RBC 0.0   51            Testing Performed By     Lab - Abbreviation Name Director Address Valid Date Range    45 - JOC875 [facility] Unknown Unknown 01/28/02 0000 - Present    51 - Unknown [facility] Unknown [address] [facility] [address] 12/31/14 1010 - Present    170 - Unknown POINT OF CARE TEST, GLUCOSE Unknown Unknown 10/31/11 1114 - Present            Unresulted Labs (24h ago through future)    Start       Ordered    08/22/18 0500  Magnesium  (EVERY WED/FRI  - AM Draw)  EVERY W,FR,   Routine      08/19/18 1318    08/22/18 0500  Phosphorus  (EVERY WED/FRI  - AM Draw)  EVERY W,FR,   Routine      08/19/18 1318    08/20/18 0500  Comprehensive metabolic panel  (Every Monday)  EVERY MONDAY,   Routine      08/13/18 1544    08/20/18 0500  Magnesium  (Every Monday)  EVERY MONDAY,   Routine      08/13/18 1544    08/20/18 0500  Phosphorus  (Every Monday)  EVERY MONDAY,   Routine      08/13/18 1544    08/20/18 0500  Prealbumin  (Every Monday)  EVERY MONDAY,   Routine      08/13/18 1544    08/20/18 0500  Triglycerides  (Every Monday)  EVERY MONDAY,   Routine      08/13/18 1544    08/20/18 0500  Magnesium  (Every Monday)  EVERY MONDAY,   Routine      08/19/18 1318    08/20/18 0500  Phosphorus  (Every Monday)  EVERY MONDAY,   Routine      08/19/18 1318    08/20/18 0500  INR  (Every Monday)  EVERY MONDAY,   Routine      08/19/18 1318    08/19/18 0600  Platelet count  (Pharmacological Prophylaxis- heparin (If CrCl less than 30 mL/min))  EVERY THREE DAYS,   Routine     Comments:  Repeat every 3 days while on VTE prophylaxis. If no result is  "listed, this lab has not been done the past 365 days. LATEST LAB RESULT: Platelet Count (10e9/L)       Date                     Value                 08/16/2018               114 (L)          ----------      08/16/18 1620    08/15/18 0500  Magnesium  (EVERY WED/FRI  - AM Draw)  EVERY W,FR,   Routine      08/13/18 1544    08/15/18 0500  Phosphorus  (EVERY WED/FRI  - AM Draw)  EVERY W,FR,   Routine      08/13/18 1544    Unscheduled  Magnesium  (Magnesium Replacement - Adult - \"High\" - Replacement for all levels less than or equal to 2 mg/dL)  CONDITIONAL (SPECIFY),   Routine     Comments:  Obtain Magnesium Level for these conditions:  *IF no magnesium result within 24 hrs before initiation of order set, draw magnesium level with next lab collect.    *2 HOURS AFTER last magnesium replacement dose when magnesium replacement given for level less than 1.6  *Next morning after magnesium dose.     Repeat Magnesium Replacement if necessary.    09/07/18 0814    Unscheduled  Phosphorus  (or    SODIUM Phosphate - \"Standard\" - Replacement for levels less than or equal to 2.4 mg/dL )  CONDITIONAL (SPECIFY),   Routine     Comments:  *IF no phosphorus result within 24 hours before initiation of order set, draw phosphorus level with next lab collect.    *2 HOURS AFTER last phosphorus replacement dose for levels less than 2.0.  *Next morning after phosphorus dose.     Repeat Phosphorus Replacement if necessary.    09/07/18 0815    Unscheduled  Hepatitis B Surface Antibody  CONDITIONAL X 1,   Routine     Comments:  Renal Dialysis RN to Release lab order and draw lab, IF hepatitis B status is unknown.    09/11/18 0658    Unscheduled  Hepatitis B surface antigen  CONDITIONAL X 1,   Routine     Comments:  Renal Dialysis RN to Release lab order and draw lab, IF hepatitis B status is unknown.    09/11/18 0658         Imaging Results - 3 Days      US Upper Extremity Venous Duplex Right Portable [[id number]]  Resulted: 09/11/18 1101, Result " status: Final result    Ordering provider: Kristi Kafuman NP  18 0008 Resulted by: Armida Garcia MD Wang, Xiao, MD    Performed: 1858 - 18 Resulting lab: RADIOLOGY RESULTS    Narrative:       Examination:  Right internal jugular venous US 2018 10:33 AM     Comparison: Right internal jugular venous ultrasound dated 2018.    History: reassess RIJ thrombus;     Findings:     Redemonstration of echogenic thrombus in the right internal jugular at  neck, the lower neck segments demonstrates no compression, previously  was partially compressible on 2018.       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      I have personally reviewed the examination and initial interpretation  and I agree with the findings.    ARMIDA GARCIA MD      Testing Performed By     Lab - Abbreviation Name Director Address Valid Date Range    104 - Rad Rslts RADIOLOGY RESULTS Unknown Unknown 05 1553 - Present               ECG/EMG Results      Echocardiogram Complete [[id number]]  Resulted: 18, Result status: Edited Result - FINAL    Ordering provider: Kristi Kaufman NP  18 0008 Resulted by: Deepti Irvin MD    Performed: 18 - 18 Resulting lab: RADIOLOGY RESULTS    Narrative:       [id number]  [id number]  [id number]  [id number]^MANDEEP^KRISTI^JAYMIE           [facility],[place]  Echocardiography Laboratory  [address] [place] [address]     Name: SANCHEZ MCNEIL  MRN: [id number]  : 1955  Study Date: 2018 09:20 AM  Age: 63 yrs  Gender: Male  Patient Location: [facility]  Reason For Study: Heart Transplant  Ordering Physician: KRISTI KAUFMAN  Performed By: ANGEL Barahona     BSA: 2.0 m2  Height: 69 in  Weight: 176 lb  BP: 155/96  mmHg  _____________________________________________________________________________  __        Procedure  Complete Portable Echo Adult.  _____________________________________________________________________________  __        Interpretation Summary  Global and regional left ventricular function is hyperkinetic with an EF of  65-70%.  Right ventricle size and systolic function is normal.  No significant valve disease and there is slight decrease in LVEF     _____________________________________________________________________________  __        Left Ventricle  Global and regional left ventricular function is hyperkinetic with an EF of  65-70%. Left ventricular wall thickness is normal. Diastolic function not  assessed due to heart transplant.     Right Ventricle  The right ventricle is normal size. Global right ventricular function is  normal. There is mild right ventricular hypertrophy.     Atria  The left atrium is enlarged due to cardiac transplantation. The right atrium  is enlarged due to cardiac transplantation.        Mitral Valve  The mitral valve is normal.     Aortic Valve  Aortic valve is normal in structure and function.     Tricuspid Valve  The tricuspid valve is normal. Trace tricuspid insufficiency is present. The  peak velocity of the tricuspid regurgitant jet is not obtainable. Pulmonary  artery systolic pressure cannot be assessed.     Pulmonic Valve  The pulmonic valve is normal.     Vessels  The aorta root is normal. The inferior vena cava was normal in size with  preserved respiratory variability. Estimated mean right atrial pressure is 3  mmHg (normal).     Pericardium  No pericardial effusion is present.        Compared to Previous Study  This study was compared with the study from 6/20/18 . The left ventricular  function has remained the same.  _____________________________________________________________________________  __     MMode/2D Measurements & Calculations  IVSd: 0.90 cm  LVIDd: 4.8  cm  LVIDs: 2.8 cm  LVPWd: 1.1 cm  FS: 41.4 %  LV mass(C)d: 174.6 grams  LV mass(C)dI: 89.2 grams/m2  LA Volume (BP): 141.0 ml  LA Volume Index (BP): 71.9 ml/m2  RWT: 0.48           Doppler Measurements & Calculations  MV E max blake: 85.9 cm/sec  MV A max blake: 68.9 cm/sec  MV E/A: 1.2  MV dec time: 0.14 sec  E/E' av.2  Lateral E/e': 7.2  Medial E/e': 15.3     _____________________________________________________________________________  __           Report approved by: Sunny Sloan 2018 10:14 AM       1    Type Source Collected On     18          View Image (below)        Echocardiogram Limited [[id number]]  Resulted: 18, Result status: In process    Ordering provider: Kristi Ayon NP  [phone] Performed: 18 - 18    Resulting lab: [facility]                   Encounter-Level Documents:     There are no encounter-level documents.      Order-Level Documents:     There are no order-level documents.

## 2018-08-12 NOTE — PLAN OF CARE
Problem: Patient Care Overview  Goal: Plan of Care/Patient Progress Review  Outcome: Improving  D/I: Patient on 4E CVICU for rectal bleeding, fever and chills.   Neuro: Intact. No deficits.   CV: NSR in 80s. BP stable, 126/74. Afebrile, 98.1. Pulses 2+.  Pulm: Lungs are clear and equal bilaterally. Satting 100% on RA.   GI: Pt came in with bloody stools. Last reported was last evening at 2100. None since arrival to hospital. Pt does complain of abdominal pain with palpation in the LLQ. This is new as of yesterday.   : No issues voiding as reported, has not voided on floor yet.   Gtts: None  Pain: Comfortably managed.   Skin: Sore in front roof of mouth. No other issues.   See flow sheets for further interventions and assessments.  A: Stable  P: Continue to monitor pt closely. Notify MD of significant changes.

## 2018-08-12 NOTE — CONSULTS
Colorectal Surgery Consultation Note    Murray Nicholson  MRN: [id number]  male  63 year old    Chief Complaint:  Pericolonic abscess    HPI:  63 year old male h/o NICM s/p heart TXP 6/2018 on immunosuppressants, ESRD on HD, R internal jugular thrombus on apixabam, T2DM, ongoing pseudomonal bacteremia admitted to cards service for 3-4 intermittent episodes of painless BRBPR that began last night w/ associated crampy lower abd/pelvic pain (improved after pepto bismol + Tylenol), subjective fevers/pills. No h/o hemorrhoids. Denies light-headedness/dizziness, N/V, SOB/CP, problems w/ urination. BMs have been regular. Last colonoscopy 5/2018 which showed diverticulosis in sigmoid, otherwise normal. CT obtained today shows likely diverticulitis w/ pericolonic abscess measuring up to 7.8cm - colorectal surgery consulted for eval.     Patient Active Problem List   Diagnosis     Esophageal reflux     Hypersomnia with sleep apnea     Allergic rhinitis     CKD (chronic kidney disease) stage 5, GFR less than 15 ml/min (H)     Hyperlipidemia LDL goal <100     Hypertension goal BP (blood pressure) < 130/80     Hypertensive cardiopathy     Anemia of chronic disease     Anemia in chronic renal disease     Hypertension     Dyslipidemia     MGUS (monoclonal gammopathy of unknown significance)     Ascending aortic aneurysm (H)     Type 2 diabetes mellitus with diabetic chronic kidney disease (H)     Anemia, iron deficiency     Anemia in stage 5 chronic kidney disease (H)     Heart transplanted (H)     Leukopenia     Heart transplant recipient (H)     Fever     Bacteremia due to Pseudomonas     Past Surgical History:   Past Surgical History:   Procedure Laterality Date     COLONOSCOPY N/A 5/3/2018    Procedure: COLONOSCOPY;  colonoscopy ;  Surgeon: Ammon Castillo MD;  Location:  GI     ESOPHAGOSCOPY, GASTROSCOPY, DUODENOSCOPY (EGD), COMBINED N/A 5/7/2018    Procedure: COMBINED ENDOSCOPIC ULTRASOUND, ESOPHAGOSCOPY, GASTROSCOPY,  DUODENOSCOPY (EGD), FINE NEEDLE ASPIRATE/BIOPSY;  Endoscopic Ultrasound with Fine Needle Aspiration ;  Surgeon: Alon Don MD;  Location: [facility] OR     LAPAROSCOPIC HERNIORRHAPHY INGUINAL BILATERAL Bilateral 7/24/2015    Procedure: LAPAROSCOPIC HERNIORRHAPHY INGUINAL BILATERAL;  Surgeon: Bobby Mcconnell MD;  Location: [facility] OR     LAPAROSCOPIC INSERTION CATHETER PERITONEAL DIALYSIS N/A 6/22/2017    Procedure: LAPAROSCOPIC INSERTION CATHETER PERITONEAL DIALYSIS;  Laparoscopic Peritoneal Dialysis Catheter Placement - Anesthesia with block;  Surgeon: Esteban Arvizu MD;  Location: [facility] OR     PICC INSERTION Left 04/22/2018    5Fr - 49cm (3cm external), Basilic vein, low SVC     REMOVE CATHETER PERITONEAL Right 1/15/2018    Procedure: REMOVE CATHETER PERITONEAL;  Open Removal of Peritoneal Dialysis Catheter ;  Surgeon: Esteban Arvizu MD;  Location: [facility] OR     TRANSPLANT HEART RECIPIENT N/A 6/14/2018    Procedure: TRANSPLANT HEART RECIPIENT;  Median Sternotomy, on-pump oxygenator, Heart Transplant;  Surgeon: Rony Caputo MD;  Location: [facility] OR     Past Medical History:   Past Medical History:   Diagnosis Date     (HFpEF) heart failure with preserved ejection fraction (H)      Allergic rhinitis, cause unspecified      Anemia of chronic kidney failure      AS (aortic stenosis)      Ascending aortic aneurysm (H)      Bicuspid aortic valve      CAD (coronary artery disease)      Chronic kidney disease, stage 5 (H)      Congestive heart failure, unspecified      Dyslipidemia      Esophageal reflux      ESRD (end stage renal disease) (H)      Hypersomnia with sleep apnea, unspecified      Hypertension      MGUS (monoclonal gammopathy of unknown significance)      Mitral regurgitation      SHEELA (obstructive sleep apnea)      Systolic heart failure (H)      Type 2 diabetes mellitus (H)      Social History:   Social History   Substance Use Topics     Smoking status: Former Smoker     Packs/day: 1.00     Years:  ".00     Types: Cigarettes     Quit date: 1994     Smokeless tobacco: Never Used     Alcohol use No     Family History:   Family History   Problem Relation Age of Onset     C.A.D. Father       from-never knew father-age 60     Diabetes Father      Cerebrovascular Disease Father      Hypertension No family hx of      Breast Cancer No family hx of      Cancer - colorectal No family hx of      Prostate Cancer No family hx of      KIDNEY DISEASE No family hx of      Melanoma No family hx of      Skin Cancer No family hx of       Allergies:   Allergies   Allergen Reactions     Norco [Hydrocodone-Acetaminophen] Nausea and Vomiting     Cats      Throat tightness     Isosorbide Other (See Comments)     hypotension     Penicillins Hives     Seasonal Allergies      rhinitis     Shrimp      Throat closes      Active Medications:   No current outpatient prescriptions on file.     ROS:  Otherwise negative    Physical Examination:   Vital Signs: /77  Pulse 99  Temp 98.1  F (36.7  C) (Oral)  Resp 16  Ht 1.753 m (5' 9\")  Wt 75 kg (165 lb 5.5 oz)  SpO2 100%  BMI 24.42 kg/m2  GEN: sleepy, able to converse but frequently fell back asleep, NAD  EENT: normal  Chest: NLB on RA, regular rate  Abdomen: soft, non-distended, minimally tender to deep palpation b/l lower quadrants & pelvis  : unremarkable rectal exam - no masses, hemorrhoids, bleeding noted; non-tender  Extremities: WWP, no edema    Labs/Imaging/Other:  Results for orders placed or performed during the hospital encounter of 18 (from the past 24 hour(s))   CBC with platelets differential   Result Value Ref Range    WBC 4.7 4.0 - 11.0 10e9/L    RBC Count 2.78 (L) 4.4 - 5.9 10e12/L    Hemoglobin 8.2 (L) 13.3 - 17.7 g/dL    Hematocrit 25.8 (L) 40.0 - 53.0 %    MCV 93 78 - 100 fl    MCH 29.5 26.5 - 33.0 pg    MCHC 31.8 31.5 - 36.5 g/dL    RDW 22.0 (H) 10.0 - 15.0 %    Platelet Count 136 (L) 150 - 450 10e9/L    Diff Method Automated Method     % " Neutrophils 78.3 %    % Lymphocytes 7.6 %    % Monocytes 9.9 %    % Eosinophils 0.2 %    % Basophils 0.2 %    % Immature Granulocytes 3.8 %    Nucleated RBCs 2 (H) 0 /100    Absolute Neutrophil 3.7 1.6 - 8.3 10e9/L    Absolute Lymphocytes 0.4 (L) 0.8 - 5.3 10e9/L    Absolute Monocytes 0.5 0.0 - 1.3 10e9/L    Absolute Eosinophils 0.0 0.0 - 0.7 10e9/L    Absolute Basophils 0.0 0.0 - 0.2 10e9/L    Abs Immature Granulocytes 0.2 0 - 0.4 10e9/L    Absolute Nucleated RBC 0.1    INR   Result Value Ref Range    INR 1.25 (H) 0.86 - 1.14   ABO/Rh type and screen   Result Value Ref Range    ABO A     RH(D) Neg     Antibody Screen Neg     Test Valid Only At          [facility]    Specimen Expires 08/15/2018    Basic metabolic panel   Result Value Ref Range    Sodium 137 133 - 144 mmol/L    Potassium 3.5 3.4 - 5.3 mmol/L    Chloride 103 94 - 109 mmol/L    Carbon Dioxide 26 20 - 32 mmol/L    Anion Gap 8 3 - 14 mmol/L    Glucose 38 (LL) 70 - 99 mg/dL    Urea Nitrogen 22 7 - 30 mg/dL    Creatinine 3.60 (H) 0.66 - 1.25 mg/dL    GFR Estimate 17 (L) >60 mL/min/1.7m2    GFR Estimate If Black 21 (L) >60 mL/min/1.7m2    Calcium 7.6 (L) 8.5 - 10.1 mg/dL   Lactic acid whole blood   Result Value Ref Range    Lactic Acid 1.4 0.7 - 2.0 mmol/L   Blood culture   Result Value Ref Range    Specimen Description Blood Right Hand     Special Requests Received in aerobic bottle only     Culture Micro PENDING    CRP inflammation   Result Value Ref Range    CRP Inflammation 76.0 (H) 0.0 - 8.0 mg/L   Erythrocyte sedimentation rate auto   Result Value Ref Range    Sed Rate 72 (H) 0 - 20 mm/h   Blood culture   Result Value Ref Range    Specimen Description Blood Left Arm     Special Requests Received in aerobic bottle only     Culture Micro PENDING    Glucose by meter   Result Value Ref Range    Glucose 95 70 - 99 mg/dL   Glucose by meter   Result Value Ref Range    Glucose 73 70 - 99 mg/dL   Glucose by meter    Result Value Ref Range    Glucose 58 (L) 70 - 99 mg/dL   Glucose by meter   Result Value Ref Range    Glucose 129 (H) 70 - 99 mg/dL   Glucose by meter   Result Value Ref Range    Glucose 117 (H) 70 - 99 mg/dL   Wound Culture Aerobic Bacterial   Result Value Ref Range    Specimen Description Mouth     Culture Micro PENDING    Blood culture   Result Value Ref Range    Specimen Description Blood DIALYSIS PORT     Special Requests Received in aerobic bottle only     Culture Micro PENDING    Glucose by meter   Result Value Ref Range    Glucose 111 (H) 70 - 99 mg/dL   US Upper Extremity Venous Duplex Right Portable    Narrative    EXAMINATION: DOPPLER VENOUS ULTRASOUND OF THE RIGHT UPPER EXTREMITY,  8/12/2018 8:20 AM     COMPARISON: Venous duplex ultrasound 6/22/2018    HISTORY: Evaluate right IJ thrombus    TECHNIQUE:  Gray-scale evaluation with compression, spectral flow and  color Doppler assessment of the deep venous system of the right upper  extremity.    FINDINGS:  Right: The low right IJ is partially compressible. The high right IJ  is fully compressible and patent. Normal blood flow and waveforms are  demonstrated in the innominate, subclavian, and axillary veins.       Impression    IMPRESSION:  1.  Unchanged nonocclusive DVT in the low right internal jugular vein.    I have personally reviewed the examination and initial interpretation  and I agree with the findings.    JOVANNA PÉREZ MD   CBC with platelets   Result Value Ref Range    WBC 4.9 4.0 - 11.0 10e9/L    RBC Count 2.52 (L) 4.4 - 5.9 10e12/L    Hemoglobin 7.4 (L) 13.3 - 17.7 g/dL    Hematocrit 23.2 (L) 40.0 - 53.0 %    MCV 92 78 - 100 fl    MCH 29.4 26.5 - 33.0 pg    MCHC 31.9 31.5 - 36.5 g/dL    RDW 22.0 (H) 10.0 - 15.0 %    Platelet Count 131 (L) 150 - 450 10e9/L   Basic metabolic panel   Result Value Ref Range    Sodium 138 133 - 144 mmol/L    Potassium 3.5 3.4 - 5.3 mmol/L    Chloride 101 94 - 109 mmol/L    Carbon Dioxide 25 20 - 32 mmol/L     Anion Gap 11 3 - 14 mmol/L    Glucose 108 (H) 70 - 99 mg/dL    Urea Nitrogen 24 7 - 30 mg/dL    Creatinine 4.01 (H) 0.66 - 1.25 mg/dL    GFR Estimate 15 (L) >60 mL/min/1.7m2    GFR Estimate If Black 18 (L) >60 mL/min/1.7m2    Calcium 7.4 (L) 8.5 - 10.1 mg/dL   Magnesium   Result Value Ref Range    Magnesium 1.4 (L) 1.6 - 2.3 mg/dL   Phosphorus   Result Value Ref Range    Phosphorus 2.6 2.5 - 4.5 mg/dL   PTT (AM Draw)   Result Value Ref Range    PTT 37 22 - 37 sec   INR   Result Value Ref Range    INR 1.31 (H) 0.86 - 1.14   Fibrinogen activity (AM Draw)   Result Value Ref Range    Fibrinogen 407 200 - 420 mg/dL   CT Abdomen Pelvis w Contrast    Narrative    Multiple inflamed loops of bowel:  1 a. Inflamed segment of distal sigmoid colon in the setting of  diverticulosis, most likely diverticulitis. This is complicated by a  pericolonic abscess measuring up to 7.8 cm, which abuts multiple loops  of small bowel.   1 b. Inflamed loop of proximal sigmoid colon, most compatible with  uncomplicated diverticulitis.  1 c. inflammation of the right colon compatible with colitis,  typhlitis is a consideration setting of neutropenia.  1 e. Unchanged inflammation of the proximal duodenum, with a chronic  appearance. Peptic ulcer disease is a consideration.  2. Small perirectal abscess, measuring up to 2.7 cm.  3. New right lower lobe pulmonary nodules measuring 5 and 3 mm,  respectively. Recommend short-term follow-up CT chest in 3 months.  4. Stable size of multiple stable pancreatic IPMNs.   Glucose by meter   Result Value Ref Range    Glucose 80 70 - 99 mg/dL     *Note: Due to a large number of results and/or encounters for the requested time period, some results have not been displayed. A complete set of results can be found in Results Review.       Admitting Diagnosis:   1. Heart transplant recipient (H)    2. Fever, unspecified fever cause    3. Chills    4. Hypoglycemia        Assessment & Plan:  63 year old male h/o  heart TXP 6/2018 on immunosuppressants, ESRD on HD, R IJ thrombus on apixabam (held, last dose 2d ago), T2DM, ongoing pseudomonal bacteremia p/w abd pain & BRBPR w/ pericolonic abscess seen on CT. Given pt's immunosuppressed status, unable to rely on the usual markers of infxn (WBC, temp, exam) and given size of abscess, would recommend invasive intervention rather than conservative mgmt.  - Recommend switching abx to Zosyn  - Recommend IR consult for drainage  - NPO & hold any anticoags in case of procedure  - Colorectal will continue to follow      D/w fellow    Fatimah Sorto MD/MPH  Plastic Surgery PGY2

## 2018-08-12 NOTE — IP AVS SNAPSHOT
"` `           [address] [facility]: [phone]                                              INTERAGENCY TRANSFER FORM - NURSING   2018                    Hospital Admission Date: 2018  SANCHEZ MCNEIL   : 1955  Sex: Male        Attending Provider: Arcelia Blum MD     Allergies:  Norco [Hydrocodone-acetaminophen], Cats, Isosorbide, Penicillins, Seasonal Allergies, Shrimp    Infection:  VRE   Service:  CARDIOLOGY    Ht:  1.753 m (5' 9\")   Wt:  80 kg (176 lb 6.4 oz)   Admission Wt:  75 kg (165 lb 5.5 oz)    BMI:  26.05 kg/m 2   BSA:  1.97 m 2            Patient PCP Information     Provider PCP Type    Yahir Turcios MD General      Current Code Status     Date Active Code Status Order ID Comments User Context       Prior      Code Status History     Date Active Date Inactive Code Status Order ID Comments User Context    2018  4:52 PM  Full Code [id number]  Kristi Ayon NP Outpatient    8/15/2018  5:13 AM 2018  4:52 PM Full Code [id number]  Rick Tran MD Inpatient    2018  5:01 AM 8/15/2018  5:13 AM Full Code [id number]  Naresh Phelan MD Inpatient    8/10/2018 11:04 AM 8/10/2018  3:25 PM Full Code [id number]  Lillie Ulloa RN Inpatient    2018  4:26 PM 8/10/2018 11:04 AM Full Code [id number]  Jennifer Dean APRN CNP Outpatient    2018  4:05 PM 2018  4:26 PM Full Code [id number]  Lillie Ulloa RN Inpatient    2018 11:44 PM 2018  4:05 PM Full Code [id number]  Antony Mejía MD Inpatient    2018  7:32 AM 2018 12:22 PM Full Code [id number]  Lillie Ulloa, TONY Inpatient    2018  1:29 PM 2018  7:32 AM Full Code [id number]  Mary Alice Andre APRN CNP Outpatient    6/15/2018 12:11 AM 2018  1:29 PM Full Code [id number]  Angelito Chance MD Inpatient    2018  6:49 PM 6/15/2018 12:11 AM Full Code [id number]  Paulina Guzman MD Inpatient    2018  7:31 PM 2018  6:49 PM Full Code [id number]  Sharonda Miguel " MD Charli Inpatient    3/28/2018  1:20 PM 3/28/2018  2:53 PM Full Code [id number]  Lottie Carpenter RN Inpatient    2/2/2018  9:48 PM 2/14/2018  6:27 PM Full Code [id number]  Evangelina Yu MD Inpatient    1/20/2018  3:06 PM 2/2/2018  9:48 PM Full Code [id number]  Naresh Aguayo MD Outpatient    1/7/2018 10:48 PM 1/20/2018  3:06 PM Full Code [id number]  Eloise Noel MD Inpatient    11/14/2017  4:52 PM 1/7/2018 10:48 PM Full Code [id number]  Rosaline Mary MD Outpatient    11/13/2017  9:58 PM 11/14/2017  4:52 PM Full Code [id number]  Marilyn Rosario MD Inpatient    6/26/2017  8:38 PM 11/13/2017  9:58 PM Full Code [id number]  Ragini Marks MD Outpatient    6/26/2017  5:28 AM 6/26/2017  8:38 PM Full Code [id number]  Oh Lawson MD Inpatient    6/22/2017 10:43 AM 6/26/2017  5:28 AM Full Code [id number]  Jennifer Huffman MD Outpatient    4/13/2017  1:23 PM 6/22/2017 10:43 AM Full Code [id number]  Matilda Torres MD Outpatient    4/8/2017 10:04 PM 4/13/2017  1:23 PM Full Code [id number]  Kenneth Gaston MD Inpatient    6/4/2004  1:50 PM 6/4/2004  1:50 PM  None  Jessee, Peri Demographics      Advance Directives        Scanned docmt in ACP Activity?           Yes, scanned ACP docmt        Hospital Problems as of 9/11/2018              Priority Class Noted POA    Bacteremia due to Pseudomonas Medium  8/12/2018 Yes    Sigmoid diverticulitis Medium  8/14/2018 Yes      Non-Hospital Problems as of 9/11/2018              Priority Class Noted    Esophageal reflux Medium  8/12/2004    Hypersomnia with sleep apnea Medium  8/18/2005    Allergic rhinitis Medium  4/5/2006    CKD (chronic kidney disease) stage 5, GFR less than 15 ml/min (H) Medium  2/9/2010    Hyperlipidemia LDL goal <100 Medium  10/31/2010    Hypertension goal BP (blood pressure) < 130/80 Medium  1/25/2011    Hypertensive cardiopathy Medium  8/24/2011    Anemia of chronic disease Medium  4/12/2013    Anemia  in chronic renal disease Medium  5/19/2015    Hypertension Medium  Unknown    Dyslipidemia Medium  Unknown    MGUS (monoclonal gammopathy of unknown significance) Medium  Unknown    Ascending aortic aneurysm (H) Medium  Unknown    Type 2 diabetes mellitus with diabetic chronic kidney disease (H) Medium  10/14/2015    Anemia, iron deficiency Medium  2/15/2017    Anemia in stage 5 chronic kidney disease (H) Medium  3/17/2017    Heart transplanted (H) Medium  6/15/2018    Leukopenia Medium  7/11/2018    Heart transplant recipient (H) Medium  7/26/2018    Fever Medium  7/26/2018      Immunizations     Name Date      HEPA 02/09/10     HEPA 02/12/08     HepB 04/05/16     HepB 11/17/15     HepB 10/07/15     Influenza (H1N1) 02/09/10     Influenza (IIV3) PF 11/26/12     Influenza (IIV3) PF 10/22/11     Influenza (IIV3) PF 12/29/10     Influenza (IIV3) PF 09/23/09     Influenza (IIV3) PF 12/02/08     Influenza (IIV3) PF 11/29/06     Influenza (IIV3) PF 11/04/03     Influenza Vaccine IM 3yrs+ 4 Valent IIV4 11/07/17     Influenza Vaccine IM 3yrs+ 4 Valent IIV4 11/08/16     Influenza Vaccine IM 3yrs+ 4 Valent IIV4 09/23/15     Influenza Vaccine IM 3yrs+ 4 Valent IIV4 12/08/14     Influenza Vaccine IM 3yrs+ 4 Valent IIV4 12/30/13     Mantoux Tuberculin Skin Test 01/23/18     Pneumo Conj 13-V (2010&after) 09/23/15     Pneumococcal 23 valent 02/23/11     Pneumococcal 23 valent 08/18/05     TD (ADULT, 7+) 08/12/04     TDAP Vaccine (Adacel) 02/28/13     Zoster vaccine, live 04/09/15          END      ASSESSMENT     Discharge Profile Flowsheet     EXPECTED DISCHARGE     SKIN      Expected Discharge Date  -- (TCU) 09/10/18 0854   Inspection of bony prominences  Full 09/11/18 0822    DISCHARGE NEEDS ASSESSMENT     Full except areas not inspected   Hip, left;Hip, right;Buttock, left;Buttock, right;Sacrum;Coccyx 09/10/18 2038    Equipment Currently Used at Home  walker, rolling 08/16/18 1212   Inspection under devices  Full 09/11/18 3056  "   Transportation Available  car 08/17/18 1139   Not Inspected under devices  Other (ostomy) 09/09/18 2336    GASTROINTESTINAL (ADULT,PEDIATRIC,OB)     Skin WDL  ex 09/11/18 0822    GI WDL  ex 09/11/18 0815   Skin Color/Characteristics  bruised (ecchymotic) 09/11/18 0822    Abdominal Appearance  contour irregular 09/11/18 0815   Skin Temperature  warm 09/11/18 0822    All Quadrants Bowel Sounds  hypoactive 09/11/18 0815   Skin Moisture  dry 09/11/18 0822    LUQ Bowel Sounds  not audible 08/18/18 0013   Skin Elasticity  quick return to original state 09/10/18 2038    RUQ Bowel Sounds  hypoactive 08/20/18 1507   Skin Integrity  bruise(s);drain/device(s);incision(s);scar(s) 09/11/18 0822    LLQ Bowel Sounds  not audible 08/18/18 0013   SAFETY      RLQ Bowel Sounds  not audible 08/18/18 0013   Safety WDL  WDL 09/11/18 1359    Last Bowel Movement  09/11/18 09/11/18 1105   Safety Factors  ID band on;upper side rails raised x 2;call light in reach;wheels locked 09/11/18 0822    GI Signs/Symptoms  abdominal pain 09/11/18 1105   Airway Safety Measures  oxygen flowmeter 09/09/18 0840    Passing flatus  yes 09/10/18 2038   Safety Equipment  oxygen flowmeter 09/09/18 0840    COMMUNICATION ASSESSMENT     All Alarms  alarm(s) activated and audible 09/11/18 1337    Patient's communication style  spoken language (English or Bilingual) 08/12/18 0222                      Assessment WDL (Within Defined Limits) Definitions           Safety WDL     Effective: 09/28/15    Row Information: <b>WDL Definition:</b> Bed in low position, wheels locked; call light in reach; upper side rails up x 2; ID band on<br> <font color=\"gray\"><i>Item=AS safety wdl>>List=AS safety wdl>>Version=F14</i></font>      Skin WDL     Effective: 09/28/15    Row Information: <b>WDL Definition:</b> Warm; dry; intact; elastic; without discoloration; pressure points without redness<br> <font color=\"gray\"><i>Item=AS skin wdl>>List=AS skin wdl>>Version=F14</i></font>    " "  Vitals     Vital Signs Flowsheet     COMMENTS     Height  1.753 m (5' 9\") 08/12/18 0230    Comments  -- (pt refused full vitals check) 09/08/18 0351   Height Method  Stated 08/12/18 0230    VITAL SIGNS     Weight  80 kg (176 lb 6.4 oz) 09/11/18 0738    Temp  98.4  F (36.9  C) 09/11/18 1656   Weight Method  Standing scale 09/10/18 0646    Temp src  Oral 09/11/18 1656   BSA (Calculated - sq m)  1.91 08/12/18 0230    Resp  16 09/11/18 1416   BMI (Calculated)  24.47 08/12/18 0230    Pulse  95 09/10/18 1925   POSITIONING      Heart Rate  96 09/11/18 1715   Body Position  independently positioning 09/11/18 0822    Pulse/Heart Rate Source  Monitor 09/10/18 1540   Head of Bed (HOB)  HOB at 30-45 degrees 09/10/18 2038    BP  (!)  152/93 09/11/18 1715   Positioning/Transfer Devices  pillows;in use 09/08/18 0902    BP Location  Right arm 09/11/18 1715   Chair  Shifted weight 09/06/18 1624    OXYGEN THERAPY     DAILY CARE      SpO2  100 % 09/11/18 1715   Activity Management  activity adjusted per tolerance;activity encouraged 09/11/18 0822    O2 Device  None (Room air) 09/11/18 1715   Activity Assistance Provided  assistance, stand-by;independent 09/11/18 0822    Oxygen Delivery  2 LPM 08/23/18 1146   Assistive Device Utilized  walker 09/07/18 0257    RESPIRATORY MONITORING     Additional Documentation  Activity Device Assistance (Row) 08/25/18 0100    Respiratory Monitoring (EtCO2)  37 mmHg 08/16/18 0021   ECG      Integrated Pulmonary Index (IPI)  10 08/16/18 0021   ECG Rhythm  Sinus rhythm 09/11/18 1656    PAIN/COMFORT     Ectopy  None 09/10/18 0323    Patient Currently in Pain  denies 09/11/18 1715   Lead Monitored  Lead II 08/18/18 0440    Preferred Pain Scale  CAPA (Clinically Aligned Pain Assessment) ([facility], [facility] and [facility] Adults Only) 09/11/18 0202   Equipment  electrodes changed 08/13/18 0403    Patient's Stated Pain Goal  2 08/29/18 0748   Ectopy Frequency  Rare 09/05/18 1925    Pain Location  Abdomen 09/10/18 " 2201   EKG MONITORING      Pain Orientation  Mid 09/10/18 2201   Cardiac Regularity  Regular 08/12/18 0408    Pain Descriptors  Aching;Dull 09/10/18 2201   Cardiac Rhythm  NSR 08/12/18 0408    Pain Management Interventions  medication offered but refused 09/09/18 1203   YOMAIRA COMA SCALE      [place] Scale Score  3-->patient responds to commands only 08/21/18 1703   Best Eye Response  4-->(E4) spontaneous 08/16/18 2202    Pain Intervention(s)  Declines;Rest 09/10/18 1710   Best Motor Response  6-->(M6) obeys commands 08/16/18 2202    Response to Interventions  Absence of nonverbal indicators of pain 09/08/18 2221   Best Verbal Response  5-->(V5) oriented 08/16/18 2202    CLINICALLY ALIGNED PAIN ASSESSMENT (CAPA) ([facility], [facility] ADULTS ONLY)     Yomaira Coma Scale Score  15 08/16/18 2202    Comfort  negligible pain 09/11/18 0202   PACEMAKER      Change in Pain  getting worse 09/10/18 2201   Paced Amount  Completely paced (100%) 09/04/18 0921    Pain Control  partially effective 09/09/18 2231   POINT OF CARE TESTING      Functioning  can do most things, but pain gets in the way of some 09/09/18 2231   Puncture Site  -- (Dialysis line) 08/14/18 1108    Sleep  normal sleep 09/09/18 2231   Bedside Glucose (mg/dl )   122 mg/dl 08/14/18 1108    ANALGESIA SIDE EFFECTS MONITORING     [REMOVED] LEFT JUGULAR INTERVENTIONAL PROCEDURE ACCESS      Side Effects Monitoring: Respiratory Quality  R 09/10/18 2201   Site Assessment  Unchanged 08/25/18 1558    Side Effects Monitoring: Respiratory Depth  N 09/10/18 2201   CMS Left Arm  WDL 08/25/18 0449    Side Effects Monitoring: Sedation Level  1 09/10/18 2201   Radial Pulse - Left Arm  Normal 08/25/18 1558    HEIGHT AND WEIGHT                   Patient Lines/Drains/Airways Status    Active LINES/DRAINS/AIRWAYS     Name: Placement date: Placement time: Site: Days: Last dressing change:    Colostomy 08/14/18   1608   LUQ   28     Peripheral IV 08/25/18 Left;Lateral  Lower forearm 08/25/18   1422   Lower forearm   17     Wound 07/26/18 Palate Ulceration 07/26/18   2300   Palate   46     Incision/Surgical Site 01/15/18 Left Abdomen 01/15/18   1719    238     Incision/Surgical Site 06/14/18 Chest 06/14/18   1907    88     Incision/Surgical Site 08/12/18 Bilateral Rectum 08/12/18 2026    29     Incision/Surgical Site 08/14/18 Abdomen 08/14/18   1607    28             Patient Lines/Drains/Airways Status    Active PICC/CVC     Name: Placement date: Placement time: Site: Days: Additional Info Last dressing change:    CVC Double Lumen 04/17/18 Right Subclavian 04/17/18   1336   Subclavian   147 Securement:             Catheter Size: 14.5            Description : Tunneled            Placement Verification:             Catheter Brand: Palindrome            Dressing Intervention: Chlorhexidine sponge            Orientation: Right            Total Catheter Length (cm): 28 cm            Line Flushed (See MAR): Yes            Tip location:             Lot #: [id number] exp:02/2021            Use for : dialysis               Intake/Output Detail Report     Date Intake                 Output         Net    Shift P.O. I.V. Other NG/GT IV Piggyback TPN Platelets Red Blood Cells Blood Components Total Urine Emesis/NG output Other Stool Blood Total       Jenny 09/10/18 0700 - 09/10/18 1459 120 50 -- -- -- -- -- -- -- 170 125 -- -- -- -- 125 45    Noc 09/10/18 1500 - 09/10/18 2359 -- -- -- -- -- -- -- -- -- -- -- -- -- 250 -- 250 -250    Day 09/11/18 0000 - 09/11/18 0659 -- -- -- -- -- -- -- -- -- -- 50 -- -- -- -- 50 -50    Jenny 09/11/18 0700 - 09/11/18 1459 240 -- -- -- -- -- -- -- -- 240 50 -- -- -- -- 50 190    Noc 09/11/18 1500 - 09/11/18 2359 -- -- 0 -- -- -- -- -- -- -- -- -- 500 -- -- 500 -500      Last Void/BM       Most Recent Value    Urine Occurrence 1 at 09/11/2018 0500    Stool Occurrence 1 at 09/07/2018 1623      Case Management/Discharge Planning     Case Management/Discharge  Planning Flowsheet     LIVING ENVIRONMENT     [facility] CAREGIVER      Lives With  alone 08/17/18 1139   Filed Complexity Screen Score  10 08/13/18 0850    Living Arrangements  apartment 08/17/18 1139   ABUSE RISK SCREEN      COPING/STRESS     QUESTION TO PATIENT:  Has a member of your family or a partner(now or in the past) intimidated, hurt, manipulated, or controlled you in any way?  no 09/08/18 0902    Major Change/Loss/Stressor  illness 08/19/18 0154   QUESTION TO PATIENT: Do you feel safe going back to the place where you are living?  yes 08/12/18 0226    EXPECTED DISCHARGE     OBSERVATION: Is there reason to believe there has been maltreatment of a vulnerable adult (ie. Physical/Sexual/Emotional abuse, self neglect, lack of adequate food, shelter, medical care, or financial exploitation)?  no 08/29/18 0748    Expected Discharge Date  -- (TCU) 09/10/18 0854   OTHER      DISCHARGE PLANNING     Are you depressed or being treated for depression?  No 08/19/18 0154    Transportation Available  car 08/17/18 1139   HOMICIDE RISK      FINAL RESOURCES     Feels Like Hurting Others  no 08/12/18 0226    Equipment Currently Used at Home  walker, rolling 08/16/18 1212

## 2018-08-12 NOTE — PROGRESS NOTES
Cardiology 2 Progress Note           Assessment and Plan:   Murray Nicholson is a 63 year old male with a h/o NICM s/p heart txp (6/14/18), ESRD on HD, R internal jugular thrombus on apixaban and DM2 p/w 3-4 bright red BM and fevers/chills, found to have a daniella-colonic abscess (7.8 cm), daniella-rectal abscess (2.7 cm), probable diverticulitis, and R colon colitis.  Planned for daniella-rectal abscess drainage tonight, likely will require colectomy later this week pending discussion between colorectal and IR.    # Daniella-colonic abscess (7.8 cm)  # Daniella-rectal abscess (2.7 cm)  # Probable diverticulitis  # R colon colitis  Found on CT abdomen/pelvis this AM.  Discussed with colorectal, IR, ID, GI.  Plan currently is to drain daniella-rectal abscess tonight.  Colorectal plans to discuss with IR but anticipates probable need for colectomy - would not do today given apixaban use.  Discussed with ID, will continue cefepime/flagyl.  If decompensating would add meropenem.  Discussed with GI concern for CMV colitis given donor positive/recipient negative - would consider scope in 8 weeks depending on how current situation progresses.  Currently is afebrile and hemodynamically stable; does not appear overtly septic but will watch closely.  - Cefepime/metronidazole per ID; would add meropenem if patient decompensating  - Colorectal, ID, IR following, appreciate assistance  - Keep NPO  - Hold ASA, heparin    # S/p heart transplant 6/13/2018 (donor 3 vessel CAD  HSV1-, HSV1+, CMV D+/R-, EBV D?/R+, VZV+  tacro goal 8 due to infx, last heart bx 7/5/2018 was 0R  8/10/2018 bx results still pending)   - Lowered prednisone to 10/10 today given likely upcoming surgery  - Will continue tacro at 1/2 and MMF at 500 BID  - Continue statin  - Due for pentamadine 8/17    # Pseudomonas bacteremia  - Continue cefepime    # Pulmonary nodules  Incidental on CT, will need interval follow up in 4-6 weeks.    # Recent R internal jugular thrombus  - Holding  apixaban in setting of pending surgery    # ESRD on TTS HD  - Nephrology consulted, appreciate assistance    # DM2 - SSI    Bobby Bearden MD PGY-3  Internal Medicine Resident  [phone]    Patient was seen by and the above plan discussed with Dr. Blum.         Subjective:   Endorsing L abdominal pain this AM, CT with findings as noted above.  Denies cough/chest pain/shortness of breath.          Medications:       biotin  5 mg Oral Daily     [START ON 8/13/2018] ceFEPIme (MAXIPIME) IV  1 g Intravenous Q24H     fluticasone  1-2 spray Both Nostrils Daily     insulin aspart  1-6 Units Subcutaneous Q4H     metroNIDAZOLE  500 mg Intravenous Q8H     mycophenolate  500 mg Oral BID IS     NEPHROCAPS  1 capsule Oral Daily     nystatin  1,000,000 Units Swish & Swallow 4x Daily     pantoprazole  40 mg Oral QAM     predniSONE  15 mg Oral BID     rosuvastatin  20 mg Oral Daily     sodium chloride (PF)  3 mL Intracatheter Q8H     sodium chloride (PF)  3 mL Intracatheter Q8H     tacrolimus  1 mg Oral QAM     tacrolimus  2 mg Oral QPM     triamcinolone   Topical BID       - MEDICATION INSTRUCTIONS -       Patient RECEIVING antibiotic to treat a different condition and it provides ADEQUATE COVERAGE for this surgical procedure.                 Objective:          Physical Exam:   Temp:  [98.1  F (36.7  C)-99.5  F (37.5  C)] 99.3  F (37.4  C)  Pulse:  [82-99] 82  Heart Rate:  [] 84  Resp:  [14-16] 16  BP: ()/(54-97) 137/77  SpO2:  [94 %-100 %] 100 %       Vitals:    08/12/18 0228   Weight: 75 kg (165 lb 5.5 oz)       GEN:  Alert, interactive, appears comfortable, NAD.  HEENT:  Necrotic ulcer at roof of mouth appears slightly decreased in size since exam last week, no new ulcers, no redness/swelling.  CV:  Regular rate and rhythm, no murmur or JVD.   LUNGS:  On room air, normal work of breathing, Clear to auscultation bilaterally, no wheezes or crackles.   ABD:  Mild LUQ/LLQ pain.  Mild rebound, no guarding, no  rigidity.  EXT:  No edema or cyanosis.  Hands/feet warm to touch with good signs of peripheral perfusion.   SKIN:  Dry to touch, no exanthems noted in the visualized areas.  NEURO:  Alert and oriented, no new focal deficits appreciated.  PSCYH: Normal mood and affect         Data:   BMP  Recent Labs  Lab 08/12/18  1253 08/12/18  0834 08/12/18  0230 08/10/18  1104    138 137 135   POTASSIUM 3.8 3.5 3.5 4.8   CHLORIDE 100 101 103 102   TRINI 7.5* 7.4* 7.6* 7.7*   CO2 25 25 26 24   BUN 26 24 22 32*   CR 4.45* 4.01* 3.60* 4.87*   * 108* 38* 220*     LFTs  Recent Labs  Lab 08/08/18  0921   ALKPHOS 156*   AST 34   ALT 22   BILITOTAL 0.4      CBC  Recent Labs  Lab 08/12/18  1253  08/12/18  0834 08/12/18  0230 08/10/18  1104   WBC 4.8  --  4.9 4.7 3.6*   RBC 2.52*  --  2.52* 2.78* 2.69*   HGB 7.5*  < > 7.4* 8.2* 8.0*   HCT 23.7*  --  23.2* 25.8* 25.2*   MCV 94  --  92 93 94   MCH 29.8  --  29.4 29.5 29.7   MCHC 31.6  --  31.9 31.8 31.7   RDW 22.2*  --  22.0* 22.0* 21.6*   *  --  131* 136* 174   < > = values in this interval not displayed.  INR  Recent Labs  Lab 08/12/18  0834 08/12/18  0230   INR 1.31* 1.25*
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
## 2018-08-12 NOTE — IP AVS SNAPSHOT
[address] [address]    Phone:  [phone]                                       After Visit Summary   8/12/2018    Murray Nicholson    MRN: [id number]           After Visit Summary Signature Page     I have received my discharge instructions, and my questions have been answered. I have discussed any challenges I see with this plan with the nurse or doctor.    ..........................................................................................................................................  Patient/Patient Representative Signature      ..........................................................................................................................................  Patient Representative Print Name and Relationship to Patient    ..................................................               ................................................  Date                                   Time    ..........................................................................................................................................  Reviewed by Signature/Title    ...................................................              ..............................................  Date                                               Time          22EPIC Rev 08/18

## 2018-08-12 NOTE — IP AVS SNAPSHOT
` `     UNIT [address]C [facility] BANK: [phone]            Medication Administration Report for Murray Nicholson as of 18 1722   Legend:    Given Hold Not Given Due Canceled Entry Other Actions    Time Time (Time) Time  Time-Action       Inactive    Active    Linked        Medications 09/05/18 09/06/18 09/07/18 09/08/18 09/09/18 09/10/18 09/11/18    0.9% sodium chloride BOLUS  Dose: 100-150 mL  Freq: EVERY 15 MIN PRN Route: IV  PRN Comment: Until hypotensive symptoms resolve  Start: 18 0658   Admin Instructions: Up to 1000 mL maximum total dose.  Give if needed to manage Systolic Blood Pressure as indicated in Hemodialysis Single Treatment set up.  Notify provider if patient blood pressure is not responsive with the bolus.    Admin. Amount: 100-150 mL  Dispense Loc: [facility] Floor Stock  Administrations Remaining: 10  Volume: 150 mL  POC: Dialysis               0.9% sodium chloride BOLUS  Dose: 300 mL  Freq: ONCE Route: HM Machine  Start: 18 0700   Admin Instructions: For Dialyzer Prime. (In Dialyzer)    Admin. Amount: 300 mL  Dispense Loc: [facility] Floor Stock  Administrations Remainin  Volume: 300 mL  POC: Dialysis           [ ] 0700           0.9% sodium chloride BOLUS  Dose: 250 mL  Freq: ONCE IN DIALYSIS Route: IV  Start: 18 0700   Admin Instructions: For patient prime during dialysis    Admin. Amount: 250 mL  Dispense Loc: [facility] Floor Stock  Administrations Remainin  Volume: 250 mL  POC: Dialysis           [ ] 0700           acetaminophen (TYLENOL) tablet 1,000 mg  Dose: 1,000 mg  Freq: EVERY 6 HOURS PRN Route: PO  PRN Reasons: mild pain,fever  Start: 18 1230   Admin Instructions: Maximum acetaminophen dose from all sources = 75 mg/kg/day not to exceed 4 gram    Admin. Amount: 2 tablet (2 × 500 mg tablet)  Last Admin: 18 1202  Dispense Loc: [facility] ADS 6C1     1202 (1,000 mg)-Given                 albuterol (PROAIR HFA/PROVENTIL HFA/VENTOLIN HFA) 108 (90 Base) MCG/ACT  inhaler 6 puff  Dose: 6 puff  Freq: EVERY 4 HOURS PRN Route: IN  PRN Reason: shortness of breath / dyspnea  Start: 18 0501   Admin. Amount: 6 puff  Dispense Loc: [facility]               alteplase (CATHFLO ACTIVASE) injection 2 mg  Dose: 2 mg  Freq: EVERY 1 HOUR PRN Route: IK  PRN Comment: BLUE lumen for dialysis catheter care if flow is less than 250 mL/min.  Start: 18   Admin Instructions: Prepare alteplase in 0.9% sodium chloride amount to equal the size of catheter lumen - using the 2 mg vial of alteplase. Administer the alteplase dose to the Blue lumen based on the volume specific to the size of the dialysis catheter lumen length used on the patient. Instill and dwell for at least 30 minutes in Dialysis Catheter port for poor flow. Notify provider if flow does not improve with the instillation of the alteplase.    Admin. Amount: 2 mg  Dispense Loc: [facility] ADS DIALYSIS  Administrations Remainin  POC: Dialysis               alteplase (CATHFLO ACTIVASE) injection 2 mg  Dose: 2 mg  Freq: EVERY 1 HOUR PRN Route: IK  PRN Comment: RED lumen for dialysis catheter care if flow is less than 250 mL/min.  Start: 18   Admin Instructions: Prepare alteplase in 0.9% sodium chloride amount to equal the size of catheter lumen - using the 2 mg vial of alteplase. Administer the alteplase dose to the RED lumen based on the volume specific to the size of the dialysis catheter lumen length used on the patient. Instill and dwell for at least 30 minutes in Dialysis Catheter port for poor flow. Notify provider if flow does not improve with the instillation of the alteplase.    Admin. Amount: 2 mg  Dispense Loc: [facility] ADS DIALYSIS  Administrations Remainin  POC: Dialysis               amLODIPine (NORVASC) tablet 10 mg  Dose: 10 mg  Freq: DAILY Route: PO  Start: 09/10/18 0800   Admin. Amount: 1 tablet (1 × 10 mg tablet)  Last Admin: 18 1717  Dispense Loc: [facility] 6C1           0846 (10 mg)-Given        1717 (10 mg)-Given [C]           aspirin EC tablet 81 mg  Dose: 81 mg  Freq: DAILY Route: PO  Start: 09/08/18 1515   Admin Instructions: DO NOT CRUSH.    Admin. Amount: 1 tablet (1 × 81 mg tablet)  Last Admin: 09/11/18 0825  Dispense Loc: [facility] ADS 6C1        1652 (81 mg)-Given        0830 (81 mg)-Given        0831 (81 mg)-Given        0825 (81 mg)-Given           atorvastatin (LIPITOR) tablet 20 mg  Dose: 20 mg  Freq: EVERY EVENING Route: PO  Start: 08/27/18 2000   Admin. Amount: 1 tablet (1 × 20 mg tablet)  Last Admin: 09/10/18 2016  Dispense Loc: [facility] ADS 6C1     2008 (20 mg)-Given        2011 (20 mg)-Given        2013 (20 mg)-Given        2040 (20 mg)-Given        2019 (20 mg)-Given        2016 (20 mg)-Given        [ ] 2000           dextrose 10 % 1,000 mL infusion  Freq: CONTINUOUS PRN Route: IV  PRN Comment:    Start: 08/19/18 1313   Admin Instructions: For Hypoglycemia Prevention for patients on long-acting subcutaneous basal insulin (Glargine, Detemir, NPH) or continuous insulin infusion. Whenever nutrition support is held or interrupted:  1) Infuse IV D10W at nutrition support rate   2) Notify provider for further instructions    Dispense Loc: [facility] Floor Stock  Volume: 1,000 mL   Mixture Administration Information:   Medication Type Amount   dextrose 10 % SOLN Base 1,000 mL                       fluticasone (FLONASE) 50 MCG/ACT spray 1-2 spray  Dose: 1-2 spray  Freq: DAILY Route: BOTH NOSTRIL  Start: 08/12/18 0800   Admin. Amount: 1-2 spray  Last Admin: 08/13/18 0824  Dispense Loc: [facility] Pharm[facility]     (0854)-Not Given        (0715)-Not Given        (0848)-Not Given        (1006)-Not Given        (0828)-Not Given        (0835)-Not Given        (0827)-Not Given           gelatin absorbable (GELFOAM) sponge 1 each  Dose: 1 each  Freq: DURING HEMODIALYSIS (FROM STOCK) Route: Top  Start: 09/11/18 0658   Admin Instructions: Apply to site of bleeding.  For dialysis access  bleeding greater than 15 minutes.    Admin. Amount: 1 each  Dispense Loc: [facility] ADS DIALYSIS  Administrations Remainin  POC: Dialysis               glucose gel 15-30 g  Dose: 15-30 g  Freq: EVERY 15 MIN PRN Route: PO  PRN Reason: low blood sugar  Start: 18   Admin Instructions: Give 15 g for BG 51 to 69 mg/dL IF patient is conscious and able to swallow. Give 30 g for BG less than or equal to 50 mg/dL IF patient is conscious and able to swallow. Do NOT give glucose gel via enteral tube.  IF patient has enteral tube: give apple juice 120 mL (4 oz or 15 g of CHO) via enteral tube for BG 51 to 69 mg/dL.  Give apple juice 240 mL (8 oz or 30 g of CHO) via enteral tube for BG less than or equal to 50 mg/dL.    ~Oral gel is preferable for conscious and able to swallow patient.   ~IF gel unavailable or patient refuses may provide apple juice 120 mL (4 oz or 15 g of CHO). Document juice on I and O flowsheet.    Admin. Amount: 15-30 g  Dispense Loc: [facility] ADS 6C1  Volume: 93.75 mL              Or  dextrose 50 % injection 25-50 mL  Dose: 25-50 mL  Freq: EVERY 15 MIN PRN Route: IV  PRN Reason: low blood sugar  Start: 18   Admin Instructions: Use if have IV access, BG less than 70 mg/dL and meet dose criteria below:  Dose if conscious and alert (or disorientated) and NPO = 25 mL  Dose if unconscious / not alert = 50 mL  Vesicant. For ordered doses up to 25 g, give IV Push undiluted. Give each 5g over 1 minute.    Admin. Amount: 25-50 mL  Last Admin: 08/15/18 1222  Dispense Loc: [facility] ADS 6C1  Infused Over: 1-5 Minutes  Volume: 50 mL              Or  glucagon injection 1 mg  Dose: 1 mg  Freq: EVERY 15 MIN PRN Route: SC  PRN Reason: low blood sugar  PRN Comment: May repeat x 1 only  Start: 18   Admin Instructions: May give SQ or IM. ONLY use glucagon IF patient has NO IV access AND is UNABLE to swallow AND blood glucose is LESS than or EQUAL to 50 mg/dL.  If ordered IV, give IV Push over 1  minute. Reconstitute with 1mL sterile water.    Admin. Amount: 1 mg  Dispense Loc: [facility] ADS 6C1               hydrALAZINE (APRESOLINE) injection 10 mg  Dose: 10 mg  Freq: EVERY 6 HOURS PRN Route: IV  PRN Reason: high blood pressure  PRN Comment: SBP > 180 and DBP > 110  Start: 09/04/18 1133   Admin Instructions: For ordered doses up to 40 mg, give IV Push undiluted over 1 minute.    Admin. Amount: 10 mg = 0.5 mL Conc: 20 mg/mL  Last Admin: 09/04/18 1143  Dispense Loc: [facility] ADS 6C1  Infused Over: 1 Minutes  Volume: 0.5 mL               hydrALAZINE (APRESOLINE) tablet 100 mg  Dose: 100 mg  Freq: EVERY 8 HOURS SCHEDULED Route: PO  Start: 09/10/18 2200   Admin Instructions: Hold for SBP < 110    Admin. Amount: 1 tablet (1 × 100 mg tablet)  Last Admin: 09/11/18 1717  Dispense Loc: [facility] ADS 6C1          2016 (100 mg)-Given               0825 (100 mg)-Given       1717 (100 mg)-Given       [ ] 2200           insulin aspart (NovoLOG) inj (RAPID ACTING)  Dose: 1-5 Units  Freq: AT BEDTIME Route: SC  Start: 08/24/18 2215   Admin Instructions: MEDIUM INSULIN RESISTANCE DOSING    Do Not give Bedtime Correction Insulin if BG less than  200.   For  - 249 give 1 units.   For  - 299 give 2 units.   For  - 349 give 3 units.   For  -399 give 4 units.   For BG greater than or equal to 400 give 5 units.  Notify provider if glucose greater than or equal to 350 mg/dL after administration of correction dose.  If given at mealtime, must be administered 5 min before meal or immediately after.    Admin. Amount: 1-5 Units  Last Admin: 09/10/18 2211  Dispense Loc: Contact Rx for dose  Volume: 3 mL     2238 (1 Units)-Given        (2302)-Not Given        2132 (1 Units)-Given [C]        (2206)-Not Given        (2218)-Not Given [C]        2211 (1 Units)-Given [C]        [ ] 2200           insulin aspart (NovoLOG) inj (RAPID ACTING)  Dose: 1-6 Units  Freq: 3 TIMES DAILY WITH MEALS Route: SC  Start: 08/24/18 1200  "  Admin Instructions: Correction Scale - MEDIUM INSULIN RESISTANCE DOSING     Do Not give Correction Insulin if BG less than 140.  For  - 189 give 1 unit.  For  - 239 give 2 units.  For  - 289 give 3 units.  For  - 339 give 4 units.  For  - 399 give 5 units.  For BG greater than or equal to 400 give 6 units.  Check blood glucose Q4H and administer based on blood glucose.  Notify provider if glucose greater than or equal to 350 mg/dL after administration of correction dose.  If given at mealtime, must be administered 5 min before meal or immediately after.    Admin. Amount: 1-6 Units  Last Admin: 09/09/18 2017  Dispense Loc: Contact Rx for dose  Volume: 3 mL     (0846)-Not Given       (1125)-Not Given       1820 (2 Units)-Given        (1130)-Not Given       (1458)-Not Given       2016 (1 Units)-Given        (0810)-Not Given       (1227)-Not Given       1839 (1 Units)-Given        (1008)-Not Given [C]       (1515)-Not Given [C]       (1908)-Not Given [C]        (0826)-Not Given [C]       1353 (1 Units)-Given [C]       2017 (1 Units)-Given [C]        (0829)-Not Given [C]       (1310)-Not Given [C]       (1925)-Not Given        (0823)-Not Given       [ ] 1200       [ ] 1800           insulin glargine (LANTUS) injection 16 Units  Dose: 16 Units  Freq: EVERY MORNING BEFORE BREAKFAST Route: SC  Start: 09/09/18 1000   Admin. Amount: 16 Units  Last Admin: 09/11/18 0823  Dispense Loc: Contact Rx for dose  Volume: 3 mL         1020 (16 Units)-Given        0836 (16 Units)-Given        0823 (16 Units)-Given           lidocaine (LMX4) cream  Freq: EVERY 1 HOUR PRN Route: Top  PRN Reason: pain  PRN Comment: with VAD insertion or accessing implanted port.  Start: 08/12/18 1905   Admin Instructions: Do NOT give if patient has a history of allergy to any local anesthetic or any \"judith\" product.   Apply 30 minutes prior to VAD insertion or port access.  MAX Dose:  2.5 g (  of 5 g tube)    Last Admin: " "09/05/18 0952  Dispense Loc: [facility] ADS 6C1     0952 ( )-Given [C]                 lidocaine (XYLOCAINE) 2 % topical gel  Freq: EVERY 4 HOURS PRN Route: Top  PRN Reason: moderate pain  Start: 08/12/18 1958   Admin Instructions: Apply to anus PRN pain.    Dispense Loc: [facility] ADS 6C1  Volume: 10 mL               lidocaine 1 % 1 mL  Dose: 1 mL  Freq: EVERY 1 HOUR PRN Route: OTHER  PRN Comment: mild pain with VAD insertion or accessing implanted port  Start: 08/12/18 1905   Admin Instructions: Do NOT give if patient has a history of allergy to any local anesthetic or any \"judith\" product. MAX dose 1 mL subcutaneous OR intradermal in divided doses.    Admin. Amount: 1 mL  Dispense Loc: [facility] ADS 6C1  Volume: 2 mL               magnesium sulfate 2 g in NS intermittent infusion (PharMEDium or [facility] Cmpd)  Dose: 2 g  Freq: DAILY PRN Route: IV  PRN Reason: magnesium supplementation  Last Dose: 2 g (09/10/18 1250)  Start: 09/07/18 0814   Admin Instructions: For Serum Mg++ 1.6 - 2 mg/dL  Give 2 g and recheck magnesium level next AM.    Admin. Amount: 2 g = 50 mL Conc: 2 g/50 mL  Last Admin: 09/10/18 1250  Dispense Loc: [facility]  Infused Over: 60 Minutes  Volume: 50 mL          1250 (2 g)-New Bag [C]            magnesium sulfate 4 g in 100 mL sterile water (premade)  Dose: 4 g  Freq: EVERY 4 HOURS PRN Route: IV  PRN Reason: magnesium supplementation  Start: 09/07/18 0814   Admin Instructions: For serum Mg++ less than 1.6 mg/dL  Give 4 g and recheck magnesium level 2 hours after dose, and next AM.    Admin. Amount: 4 g = 100 mL Conc: 4 g/100 mL  Last Admin: 09/07/18 1017  Dispense Loc: [facility] ADS 6C1  Infused Over: 120 Minutes  Volume: 100 mL       1017 (4 g)-New Bag               naloxone (NARCAN) injection 0.1-0.4 mg  Dose: 0.1-0.4 mg  Freq: EVERY 2 MIN PRN Route: IV  PRN Reason: opioid reversal  Start: 08/12/18 0518   Admin Instructions: For respiratory rate LESS than or EQUAL to 8.  Partial reversal dose:  0.1 " mg titrated q 2 minutes for Analgesia Side Effects Monitoring Sedation Level of 3 (frequently drowsy, arousable, drifts to sleep during conversation).Full reversal dose:  0.4 mg bolus for Analgesia Side Effects Monitoring Sedation Level of 4 (somnolent, minimal or no response to stimulation).  For ordered doses up to 2mg give IVP. Give each 0.4mg over 15 seconds in emergency situations. For non-emergent situations further dilute in 9mL of NS to facilitate titration of response.    Admin. Amount: 0.1-0.4 mg = 0.25-1 mL Conc: 0.4 mg/mL  Dispense Loc: [facility] ADS 6C1  Volume: 1 mL               NEPHROCAPS capsule 1 capsule  Dose: 1 capsule  Freq: DAILY Route: PO  Start: 18 1430   Admin. Amount: 1 capsule  Last Admin: 18 0825  Dispense Loc: [facility] ADS 6C1     1126 (1 capsule)-Given        1207 (1 capsule)-Given        0801 (1 capsule)-Given        0913 (1 capsule)-Given        0830 (1 capsule)-Given        0831 (1 capsule)-Given        0825 (1 capsule)-Given           No heparin via hemodialysis machine  Freq: ONCE Route: XX  Start: 18 0700   Admin Instructions: Check ACT during first 30 minutes of dialysis. Normal saline flushes may be used to maintain patency of circuit.    Dispense Loc: [facility]  Administrations Remainin  POC: Dialysis           [ ] 0700           nystatin (MYCOSTATIN) suspension 1,000,000 Units  Dose: 1,000,000 Units  Freq: 4 TIMES DAILY Route: SWISH & SWAL  Start: 18 0800   Admin Instructions: Shake well    Admin. Amount: 1,000,000 Units = 10 mL Conc: 100,000 Units/mL  Last Admin: 18 171  Dispense Loc: [facility] ADS 6C1  Volume: 10 mL     (1031)-Not Given       1127 (1,000,000 Units)-Given       1608 (1,000,000 Units)-Given        (1,000,000 Units)-Given        0707 (1,000,000 Units)-Given       1207 (1,000,000 Units)-Given       1717 (1,000,000 Units)-Given        (1,000,000 Units)-Given        0801 (1,000,000 Units)-Given       1222  (1,000,000 Units)-Given       1551 (1,000,000 Units)-Given       2013 (1,000,000 Units)-Given        0911 (1,000,000 Units)-Given       1341 (1,000,000 Units)-Given       1652 (1,000,000 Units)-Given       2040 (1,000,000 Units)-Given        0833 (1,000,000 Units)-Given       1203 (1,000,000 Units)-Given       1725 (1,000,000 Units)-Given       2019 (1,000,000 Units)-Given        0849 (1,000,000 Units)-Given       1300 (1,000,000 Units)-Given       1710 (1,000,000 Units)-Given       2212 (1,000,000 Units)-Given        0824 (1,000,000 Units)-Given       1209 (1,000,000 Units)-Given       1717 (1,000,000 Units)-Given       [ ] 2000           ondansetron (ZOFRAN) injection 4 mg  Dose: 4 mg  Freq: EVERY 6 HOURS PRN Route: IV  PRN Reasons: nausea,vomiting  Start: 08/12/18 0501   Admin Instructions: This is Step 1 of nausea and vomiting management.  If nausea not resolved in 15 minutes, go to Step 2 prochlorperazine (COMPAZINE).  Irritant. For ordered doses up to 4 mg, give IV Push undiluted over 2-5 minutes.    Admin. Amount: 4 mg = 2 mL Conc: 4 mg/2 mL  Dispense Loc: [facility] ADS 6C1  Infused Over: 2-5 Minutes  Volume: 2 mL               pantoprazole (PROTONIX) EC tablet 40 mg  Dose: 40 mg  Freq: EVERY MORNING BEFORE BREAKFAST Route: PO  Start: 08/29/18 0730   Admin Instructions: DO NOT CRUSH.    Admin. Amount: 1 tablet (1 × 40 mg tablet)  Last Admin: 09/11/18 0825  Dispense Loc: [facility] ADS 6C1     1127 (40 mg)-Given        1207 (40 mg)-Given        0801 (40 mg)-Given        0914 (40 mg)-Given        0830 (40 mg)-Given        0831 (40 mg)-Given        0825 (40 mg)-Given           polyethylene glycol (MIRALAX/GLYCOLAX) Packet 17 g  Dose: 17 g  Freq: DAILY PRN Route: PO  PRN Reason: constipation  Start: 08/23/18 5255   Admin Instructions: Only give if stool from colostomy is hard.  1 Packet = 17 grams. Mixed prescribed dose in 8 ounces of water. Follow with 8 oz. of water.    Admin. Amount: 17 g  Dispense Loc: [facility] ADS  6C1               predniSONE (DELTASONE) tablet 5 mg  Dose: 5 mg  Freq: 2 TIMES DAILY WITH MEALS Route: PO  Start: 09/07/18 1800   Admin. Amount: 1 tablet (1 × 5 mg tablet)  Last Admin: 09/11/18 1716  Dispense Loc: [facility] ADS 6C1       1826 (5 mg)-Given        0913 (5 mg)-Given       1902 (5 mg)-Given        0830 (5 mg)-Given       1724 (5 mg)-Given        0831 (5 mg)-Given       1818 (5 mg)-Given        0825 (5 mg)-Given       1716 (5 mg)-Given           prochlorperazine (COMPAZINE) injection 10 mg  Dose: 10 mg  Freq: EVERY 6 HOURS PRN Route: IV  PRN Reasons: nausea,vomiting  Start: 08/12/18 0501   Admin Instructions: This is Step 2 of nausea and vomiting management. Give if nausea not resolved 15 minutes after giving ondansetron (ZOFRAN).  If nausea not resolved in 15 minutes, go to Step 3 metoclopramide (REGLAN), if ordered.  For ordered doses up to 10 mg, give IV Push undiluted. Each 5mg over 1 minute.    Admin. Amount: 10 mg = 2 mL Conc: 5 mg/mL  Dispense Loc: [facility] ADS 6C1  Infused Over: 1-2 Minutes  Volume: 2 mL               simethicone (MYLICON) chewable tablet 80 mg  Dose: 80 mg  Freq: EVERY 6 HOURS PRN Route: PO  PRN Reason: cramping  Start: 08/31/18 1502   Admin. Amount: 1 tablet (1 × 80 mg tablet)  Dispense Loc: [facility] ADS 6C1               sodium chloride (PF) 0.9% PF flush 10 mL  Dose: 10 mL  Freq: EVERY 1 HOUR PRN Route: IK  PRN Reason: post meds or blood draw  PRN Comment: FLUSH dialysis catheter - BLUE LUMEN  Start: 09/11/18 0658   Admin Instructions: Flush CVC with 10 mL post IV meds/20 mL post blood draw.   **Aspirate and discard 3 mL prior to flushing.    Admin. Amount: 10 mL  Dispense Loc: [facility] Floor Stock  Volume: 10 mL  POC: Dialysis               sodium chloride (PF) 0.9% PF flush 10 mL  Dose: 10 mL  Freq: EVERY 1 HOUR PRN Route: IK  PRN Reason: post meds or blood draw  PRN Comment: FLUSH dialysis catheter - RED LUMEN  Start: 09/11/18 0658   Admin Instructions: Flush CVC with 10 mL  post IV meds/20 mL post blood draw.   **Aspirate and discard 3 mL prior to flushing.    Admin. Amount: 10 mL  Dispense Loc: [facility]  Volume: 10 mL  POC: Dialysis               sodium chloride (PF) 0.9% PF flush 3 mL  Dose: 3 mL  Freq: DURING HEMODIALYSIS (FROM STOCK) Route: IK  Start: 18   Admin Instructions: After accessing catheter, PRN catheter DWELL T[place]  connector caps.   To BLUE lumen for dialysis catheter care.  Administer volume specific to catheter lumen. Catheter dwell is equal to the size of catheter lumen.    Admin. Amount: 3 mL  Dispense Loc: [facility]  Administrations Remainin  Volume: 3 mL  POC: Dialysis               sodium chloride (PF) 0.9% PF flush 3 mL  Dose: 3 mL  Freq: DURING HEMODIALYSIS (FROM STOCK) Route: IK  Start: 18   Admin Instructions: After accessing catheter, PRN catheter DWELL [place]  connector caps.   To RED lumen for dialysis catheter care.   Administer volume specific to catheter lumen. Catheter dwell is equal to the size of catheter lumen.    Admin. Amount: 3 mL  Dispense Loc: [facility]  Administrations Remainin  Volume: 3 mL  POC: Dialysis               sodium chloride (PF) 0.9% PF flush 3 mL  Dose: 3 mL  Freq: EVERY 8 HOURS Route: IK  Start: 08/15/18 0513   Admin Instructions: And Q1H PRN, to lock peripheral IV dormant line.    Admin. Amount: 3 mL  Last Admin: 18 0830  Dispense Loc: [facility]  Volume: 3 mL  POC: Post-procedure   Current Line: Peripheral IV 18 Left;Lateral Lower forearm    (0007)-Not Given       0855 (3 mL)-Given       1606 (3 mL)-Given        (0023)-Not Given       (1031)-Not Given       (1636)-Not Given       2341 (3 mL)-Given        0811 (3 mL)-Given       (1544)-Not Given       2321 (3 mL)-Given               1656 (3 mL)-Given       2206 (3 mL)-Given               1024 (3 mL)-Given       (1653)-Not Given [C]       2130 (3 mL)-Given               0853 (3 mL)-Given        (1712)-Not Given [C]        0830 (3 mL)-Given              (1722)-Not Given           sodium chloride (PF) 0.9% PF flush 3 mL  Dose: 3 mL  Freq: EVERY 1 HOUR PRN Route: IK  PRN Reason: line flush  PRN Comment: for peripheral IV flush post IV meds  Start: 08/15/18 0513   Admin. Amount: 3 mL  Last Admin: 08/22/18 0419  Dispense Loc: [facility] Floor Stock  Volume: 3 mL  POC: Post-procedure               tacrolimus (GENERIC EQUIVALENT) capsule 2 mg  Dose: 2 mg  Freq: 2 TIMES DAILY. Route: PO  Start: 09/07/18 1800   Admin Instructions: Check if BLOOD LEVEL is needed BEFORE administering dose.  This order specifically allows the use of a generic equivalent of tacrolimus (PROGRAF) capsules.    Admin. Amount: 2 capsule (2 × 1 mg capsule)  Last Admin: 09/11/18 1716  Dispense Loc: [facility] ADS 6C1       1826 (2 mg)-Given        0910 (2 mg)-Given       1902 (2 mg)-Given        0849 (2 mg)-Given       1725 (2 mg)-Given        0831 (2 mg)-Given       1818 (2 mg)-Given        0824 (2 mg)-Given       1716 (2 mg)-Given           traMADol (ULTRAM) half-tab 25-50 mg  Dose: 25-50 mg  Freq: EVERY 12 HOURS PRN Route: PO  PRN Reason: moderate pain  Start: 08/22/18 1023   Admin. Amount: 1-2 half-tab (1-2 × 25 mg half-tab)  Last Admin: 09/10/18 2212  Dispense Loc: [facility] ADS 6C1     2239 (50 mg)-Given        2338 (50 mg)-Given        2132 (50 mg)-Given        2206 (50 mg)-Given        2135 (50 mg)-Given        2212 (50 mg)-Given            triamcinolone (KENALOG) 0.1 % ointment  Freq: 2 TIMES DAILY Route: Top  Start: 08/12/18 0800   Admin Instructions: Apply to affected area    Last Admin: 08/14/18 2006  Dispense Loc: [facility]     (0854)-Not Given       (2127)-Not Given        (0715)-Not Given       (2011)-Not Given        (0849)-Not Given       (2013)-Not Given        (1337)-Not Given       (2039)-Not Given        (0827)-Not Given       (2014)-Not Given        (0835)-Not Given       (2015)-Not Given        (0830)-Not  Given       [ ] 2000          Future Medications  Medications 09/05/18 09/06/18 09/07/18 09/08/18 09/09/18 09/10/18 09/11/18       pentamidine (NEBUPENT) neb solution 300 mg  Dose: 300 mg  Freq: ONCE Route: IN  Indications Comment: PJP prophylaxis post OHT  Start: 18 0800   Admin Instructions: Administer neb in private room only. Reconstitute only with 6 mL of sterile water for injection; do not use NS ; recommended nebulizer flow rate is 5 to 7 L per minute.    Admin. Amount: 300 mg  Dispense Loc: [facility]  Administrations Remainin              Completed Medications  Medications 09/05/18 09/06/18 09/07/18 09/08/18 09/09/18 09/10/18 09/11/18         Dose: 2 mcg  Freq: ONCE IN DIALYSIS Route: IV  Start: 18 0700   End: 18 1413   Admin Instructions: GIVE DURING DIALYSIS    Admin. Amount: 2 mcg = 1 mL Conc: 2 mcg/mL  Last Admin: 18 1413  Dispense Loc: [facility]  Administrations Remainin  Volume: 2 mL  POC: Dialysis           1413 (2 mcg)-Given             Dose: 10,000 Units  Freq: ONCE IN DIALYSIS Route: IV  Start: 18 0700   End: 18 1500   Admin Instructions: Give during dialysis.  If giving IV, For ordered doses up to 500 units/kg, give IV Push undiluted over 1 minute.    Admin. Amount: 10,000 Units = 1 mL Conc: 10,000 Units/mL  Last Admin: 18 1500  Dispense Loc: [facility]  Administrations Remainin  Volume: 1 mL  POC: Dialysis           1500 (10,000 Units)-Given             Dose: 3 mL  Freq: DURING HEMODIALYSIS (FROM STOCK) Route: IK  Start: 18 0658   End: 18 1638   Admin Instructions: IF patient does NOT have a Tego  connector cap.   After accessing catheter, PRN catheter DWELL for non Tego  connector caps.  To BLUE lumen for dialysis catheter care  Administer volume specific to catheter lumen (dose above is the size of vial or syringe dispensed, not volume to administer). Catheter dwell is equal to the size of  catheter lumen.    Admin. Amount: 3,000 Units = 3 mL Conc: 1,000 Units/mL  Last Admin: 18  Dispense Loc: [facility]  Administrations Remainin  Volume: 3 mL  POC: Dialysis           1638 (2.1 Units)-Given             Dose: 3 mL  Freq: DURING HEMODIALYSIS (FROM STOCK) Route: IK  Start: 18 0658   End: 18   Admin Instructions: IF patient does NOT have a Tego  connector cap.   After accessing catheter, PRN catheter DWELL for non Tego  connector caps.  To RED lumen for dialysis catheter care  Administer volume specific to catheter lumen (dose above is the size of vial or syringe dispensed, not volume to administer). Catheter dwell is equal to the size of catheter lumen.    Admin. Amount: 3,000 Units = 3 mL Conc: 1,000 Units/mL  Last Admin: 18  Dispense Loc: [facility]  Administrations Remainin  Volume: 3 mL  POC: Dialysis           1638 (2.1 Units)-Given [C]             Dose: 4 mg%  Freq: ONCE Route: HM Machine  Start: 18 0700   End: 18 135   Admin Instructions: IN DIALYSATE into each bicarbonate jug.    Admin. Amount: 4 mg%  Last Admin: 18  Dispense Loc: [facility]  Administrations Remainin  POC: Dialysis           1359 (4 mg%)-Given          Discontinued Medications  Medications 09/05/18 09/06/18 09/07/18 09/08/18 09/09/18 09/10/18 09/11/18         Dose: 100-150 mL  Freq: EVERY 15 MIN PRN Route: IV  PRN Comment: Until hypotensive symptoms resolve  Start: 18 0633   End: 18 174   Admin Instructions: Up to 1000 mL maximum total dose.  Give if needed to manage Systolic Blood Pressure as indicated in Hemodialysis Single Treatment set up.  Notify provider if patient blood pressure is not responsive with the bolus.    Admin. Amount: 100-150 mL  Dispense Loc: [facility] Stock  Administrations Remaining: 10  Volume: 150 mL  POC: Dialysis        (1233)-Not Given       -Med Discontinued            Dose: 2  mg  Freq: EVERY 1 HOUR PRN Route: IK  PRN Comment: BLUE lumen for dialysis catheter care if flow is less than 250 mL/min.  Start: 18   End: 18   Admin Instructions: Prepare alteplase in 0.9% sodium chloride amount to equal the size of catheter lumen - using the 2 mg vial of alteplase. Administer the alteplase dose to the Blue lumen based on the volume specific to the size of the dialysis catheter lumen length used on the patient. Instill and dwell for at least 30 minutes in Dialysis Catheter port for poor flow. Notify provider if flow does not improve with the instillation of the alteplase.    Admin. Amount: 2 mg  Dispense Loc: [facility]IALYS[facility]  Administrations Remainin  POC: Dialysis        ()-Not Given       -Med Discontinued            Dose: 2 mg  Freq: EVERY 1 HOUR PRN Route: IK  PRN Comment: RED lumen for dialysis catheter care if flow is less than 250 mL/min.  Start: 18   End: 18   Admin Instructions: Prepare alteplase in 0.9% sodium chloride amount to equal the size of catheter lumen - using the 2 mg vial of alteplase. Administer the alteplase dose to the RED lumen based on the volume specific to the size of the dialysis catheter lumen length used on the patient. Instill and dwell for at least 30 minutes in Dialysis Catheter port for poor flow. Notify provider if flow does not improve with the instillation of the alteplase.    Admin. Amount: 2 mg  Dispense Loc: [facility]  Administrations Remainin  POC: Dialysis        ()-Not Given       -Med Discontinued            Dose: 7.5 mg  Freq: DAILY Route: PO  Start: 18 0800   End: 09/10/18 0729   Admin. Amount: 1 tablet (1 × 2.5 mg tablet) + 1 tablet (1 × 5 mg tablet)  Last Admin: 18  Dispense Loc: [facility] AD[facility]   Mixture Administration Information:   Medication Type Amount   amLODIPine 2.5 MG TABS Medications 2.5 mg   amLODIPine 5 MG TABS Medications 5 mg               1212 (7.5 mg)-Given        0800 (7.5 mg)-Given        1007-Hold [C]       1215 (7.5 mg)-Given        0830 (7.5 mg)-Given        0729-Med Discontinued          Dose: 1 each  Freq: DURING HEMODIALYSIS (FROM STOCK) Route: Top  Start: 18 06   End: 18 1742   Admin Instructions: Apply to site of bleeding.  For dialysis access bleeding greater than 15 minutes.    Admin. Amount: 1 each  Dispense Loc: [facility]  Administrations Remainin  POC: Dialysis        (904)-Not Given       174-Med Discontinued            Dose: 75 mg  Freq: EVERY 8 HOURS SCHEDULED Route: PO  Start: 18 0715   End: 09/10/18 1527   Admin Instructions: Hold for SBP < 110    Admin. Amount: 1 tablet (1 × 25 mg tablet) + 1 tablet (1 × 50 mg tablet)  Last Admin: 09/10/18 1339  Dispense Loc: [facility] 6C1   Mixture Administration Information:   Medication Type Amount   hydrALAZINE 25 MG TABS Medications 25 mg   hydrALAZINE 50 MG TABS Medications 50 mg                (1007)-Not Given [C]       1216 (75 mg)-Given       (1338)-Not Given [C]       2040 (75 mg)-Given               0612 (75 mg)-Given       1358 (75 mg)-Given       2135 (75 mg)-Given        0535 (75 mg)-Given       1339 (75 mg)-Given       1527-Med Discontinued          Dose: 16 Units  Freq: EVERY MORNING BEFORE BREAKFAST Route: SC  Start: 09/10/18 0730   End: 1859   Admin. Amount: 16 Units  Volume: 0.16 mL         0959-Med Discontinued           Dose: 20 Units  Freq: EVERY MORNING BEFORE BREAKFAST Route: SC  Start: 18 1130   End: 18 0949   Admin. Amount: 20 Units  Last Admin: 18 1012  Dispense Loc: Contact Rx for dose  Volume: 3 mL     1131 (20 Units)-Given        0843 (20 Units)-Given        0858 (20 Units)-Given        1012 (20 Units)-Given        0949-Med Discontinued  (1000)-Not Given               Dose: 10 mL  Freq: EVERY 1 HOUR PRN Route: IK  PRN Reason: post meds or blood draw  PRN Comment: FLUSH dialysis catheter - BLUE  LUMEN  Start: 18   End: 18   Admin Instructions: Flush CVC with 10 mL post IV meds/20 mL post blood draw.   **Aspirate and discard 3 mL prior to flushing.    Admin. Amount: 10 mL  Last Admin: 18  Dispense Loc: [facility]  Volume: 10 mL  POC: Dialysis        0906 (10 mL)-Given       1742-Med Discontinued            Dose: 10 mL  Freq: EVERY 1 HOUR PRN Route: IK  PRN Reason: post meds or blood draw  PRN Comment: FLUSH dialysis catheter - RED LUMEN  Start: 18   End: 18   Admin Instructions: Flush CVC with 10 mL post IV meds/20 mL post blood draw.   **Aspirate and discard 3 mL prior to flushing.    Admin. Amount: 10 mL  Last Admin: 18  Dispense Loc: [facility]  Volume: 10 mL  POC: Dialysis        0906 (10 mL)-Given       1742-Med Discontinued            Dose: 3 mL  Freq: DURING HEMODIALYSIS (FROM STOCK) Route: IK  Start: 18   End: 18   Admin Instructions: After accessing catheter, PRN catheter DWELL Tego  connector caps.   To BLUE lumen for dialysis catheter care.  Administer volume specific to catheter lumen. Catheter dwell is equal to the size of catheter lumen.    Admin. Amount: 3 mL  Dispense Loc: [facility]  Administrations Remainin  Volume: 3 mL  POC: Dialysis        1742-Med Discontinued            Dose: 3 mL  Freq: DURING HEMODIALYSIS (FROM STOCK) Route: IK  Start: 18   End: 18   Admin Instructions: After accessing catheter, PRN catheter DWELL Tego  connector caps.   To RED lumen for dialysis catheter care.   Administer volume specific to catheter lumen. Catheter dwell is equal to the size of catheter lumen.    Admin. Amount: 3 mL  Dispense Loc: [facility]  Administrations Remainin  Volume: 3 mL  POC: Dialysis        1742-Med Discontinued       Medications 09/05/18 09/06/18 09/07/18 09/08/18 09/09/18 09/10/18 09/11/18

## 2018-08-12 NOTE — CONSULTS
Nephrology Initial Consult  August 12, 2018      Murray Nicholson MRN:[id number] YOB: 1955  Date of Admission:8/12/2018  Primary care provider: Yahir Turcios  Requesting physician: Arcelia Blum, *    ASSESSMENT AND RECOMMENDATIONS:     1. ESRD - Etiology of ESRD 2/2 HTN, DM, CRS.   Has chronic OP HD at [facility], TTS schedule, under care of Dr Mcpherson.    - Dialysis orders: Right TDC, EDW 76 kg, Run time 4 hrs   - Will continue TTS schedule while inpatient    2. Volume status - Euvolemic. No edema/dyspnea. SBP teens - 150/. No urine recorded. Weight 75 kg   - EDW 76 kg   - Continue pre run weights   - I/O    3. HTN - Variable control. Has pain. SBP teens - 150/. OP regimen:  Clonidine 0.1 mg HS, Hydralazine 50 mg QID, Lisinopril 20 mg every day   - All currently on hold    4. Transplant IS regimen: Tac, MMF, Pred     5. Electrolytes - No acute concerns. K 3.5, Na 138    6. Acid base - No acute concerns. Bicarb 25    7. BMD - Ca 7.4, Mg 1.4, Phos 2.6   - Vit D/PTH being managed in OP HD unit    8. Anemia - Hgb 7.4. On Epo 7600 q run   - Will confirm with HD unit tomorrow    9. Abdominal pain/bloody stool - Abd Ct today showing pericolonic abscess 7.8 cm, small perircal abscess 2.7 cm. Colorectal surgery has consulted w/recommendations for [place], IR for drainage, hold anticoagulants and NPO.    - ID has consulted and recommending Cefepime and Flagyl. Checking stools cxs, colonoscopy   - WBC 4.9, afebrile    10. Lung nodules - New Right lower lobe pulmonary nodules seen on CT today. ID recommending close f/u.     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   Last HD 8/11/18.     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  T2DM  Recent Pseudomonas bacteremia  HTN  HLD  GERD  Anemia of chronic disease  MGUS  SHEELA    Past Surgical History:   Procedure Laterality Date     COLONOSCOPY N/A 5/3/2018    Procedure: COLONOSCOPY;  colonoscopy ;  Surgeon: Ammon Castillo MD;  Location: [facility] GI     ESOPHAGOSCOPY, GASTROSCOPY, DUODENOSCOPY (EGD), COMBINED N/A 5/7/2018    Procedure: COMBINED ENDOSCOPIC ULTRASOUND, ESOPHAGOSCOPY, GASTROSCOPY, DUODENOSCOPY (EGD), FINE NEEDLE ASPIRATE/BIOPSY;  Endoscopic Ultrasound with Fine Needle Aspiration ;  Surgeon: Alon Don MD;  Location: [facility] OR     LAPAROSCOPIC HERNIORRHAPHY INGUINAL BILATERAL Bilateral 7/24/2015    Procedure: LAPAROSCOPIC HERNIORRHAPHY INGUINAL BILATERAL;  Surgeon: Bobby Mcconnell MD;  Location: [facility] OR     LAPAROSCOPIC INSERTION CATHETER PERITONEAL DIALYSIS N/A 6/22/2017    Procedure: LAPAROSCOPIC INSERTION CATHETER PERITONEAL DIALYSIS;  Laparoscopic Peritoneal Dialysis Catheter Placement - Anesthesia with block;  Surgeon: Esteban Arvizu MD;  Location: [facility] OR     PICC INSERTION Left 04/22/2018    5Fr - 49cm (3cm external), Basilic vein, low SVC     REMOVE CATHETER PERITONEAL Right 1/15/2018    Procedure: REMOVE CATHETER PERITONEAL;  Open Removal of Peritoneal Dialysis Catheter ;  Surgeon: Esteban Arvizu MD;  Location: [facility] OR     TRANSPLANT HEART RECIPIENT N/A 6/14/2018    Procedure: TRANSPLANT HEART RECIPIENT;  Median Sternotomy, on-pump oxygenator, Heart Transplant;  Surgeon: Rony Caputo MD;  Location: [facility] OR        MEDICATIONS:  PTA Meds  Prior to Admission medications    Medication Sig Last Dose Taking? Auth Provider   acetaminophen (TYLENOL) 325 MG tablet Take 2 tablets (650 mg) by  mouth every 4 hours as needed for mild pain (multimodal surgical pain management along with NSAIDS and opioid medication as indicated based on pain control and physical function.)   Mary Alice Andre APRN CNP   albuterol (PROAIR HFA/PROVENTIL HFA/VENTOLIN HFA) 108 (90 Base) MCG/ACT Inhaler Inhale 6 puffs into the lungs every 4 hours as needed for shortness of breath / dyspnea   Mary Alice Andre APRN CNP   apixaban ANTICOAGULANT (ELIQUIS) 2.5 MG tablet Take 1 tablet (2.5 mg) by mouth 2 times daily   Mary Alice Andre APRN CNP   ASPIRIN 81 MG OR TABS Take 1 tablet (81 mg) by mouth at bedtime   Reported, Patient   biotin (BIOTIN 5000) 5 MG CAPS Take 5 mg by mouth daily   Jennifer Reid PA-C   blood glucose monitoring (ACCU-CHEK FASTCLIX) lancets Use to test blood sugar 2-3 times daily or as directed.  Ok to substitute alternative if insurance prefers.   Yahir Turcios MD   blood glucose monitoring (NO BRAND SPECIFIED) test strip Use to test blood sugar 2-3 times daily or as directed.   Mellisa Suh APRN CNP   cloNIDine (CATAPRES) 0.1 MG tablet Take 1 tablet (0.1 mg) by mouth At Bedtime   Jennifer Dean APRN CNP   fluticasone (FLONASE) 50 MCG/ACT spray Spray 1-2 sprays into both nostrils daily   Klever Ernst MD   hydrALAZINE (APRESOLINE) 25 MG tablet Take 2 tablets (50 mg) by mouth 4 times daily Hold if top number BP less than 110.  Patient taking differently: Take 75 mg by mouth 3 times daily Hold if top number BP less than 110.   Jennifer Dean APRN CNP   insulin isophane human (HUMULIN N PEN) 100 UNIT/ML injection 30 units in AM and 16 units in the evening. Please check blood sugar four times daily.   Jennifer Dean APRN CNP   lisinopril (PRINIVIL/ZESTRIL) 20 MG tablet Take 1 tablet (20 mg) by mouth daily   Jennifer Dean APRN CNP   loratadine (CLARITIN) 10 MG tablet Take 10 mg by mouth daily as needed Reported on 5/3/2017   Reported, Patient   melatonin 1 MG TABS tablet Take 2  tablets (2 mg) by mouth nightly as needed for sleep   Mellisa Suh APRN CNP   mycophenolate (GENERIC EQUIVALENT) 250 MG capsule Take 2 capsules (500 mg) by mouth 2 times daily   Jennifer Dean APRN CNP   NEPHROCAPS 1 MG capsule Take 1 capsule by mouth daily   Mary Alice Andre APRN CNP   nystatin (MYCOSTATIN) 720500 UNIT/ML suspension Swish and swallow 10 mLs (1,000,000 Units) in mouth 4 times daily   Mary Alice Andre APRN CNP   order for DME Equipment being ordered: Carol ()  Treatment Diagnosis: ESRD on PD  Pt has to be connected to PD all night and can not be disconnected, hence impending his mobility to go to the bathroom. At risk for infection if he does not have this equipment.  Patient not taking: Reported on 7/26/2018   Breanne Miller MD   pantoprazole (PROTONIX) 40 MG EC tablet Take 1 tablet (40 mg) by mouth every morning   Klever Ernst MD   pravastatin (PRAVACHOL) 40 MG tablet Take 1 tablet (40 mg) by mouth daily DC after current prescription completed; replace with rosuvastatin.   Klever Ernst MD   predniSONE (DELTASONE) 5 MG tablet Take 3 tablets (15 mg) by mouth 2 times daily   Jennifer Dean APRN CNP   rosuvastatin (CRESTOR) 20 MG tablet Take 1 tablet (20 mg) by mouth daily   Klever Ernst MD   tacrolimus (GENERIC EQUIVALENT) 1 MG capsule 1 mg in AM and 2 mg at supper. Repeat level Wednesday.   Jennifer Dean APRN CNP   traMADol (ULTRAM) 50 MG tablet Take 1 tablet (50 mg) by mouth every 6 hours as needed for moderate pain   Mary Alice Andre APRN CNP   triamcinolone (KENALOG) 0.1 % ointment Apply topically 2 times daily   Jennifer Reid PA-C      Current Meds    biotin  5 mg Oral Daily     [START ON 8/13/2018] ceFEPIme (MAXIPIME) IV  1 g Intravenous Q24H     fluticasone  1-2 spray Both Nostrils Daily     heparin (porcine)  3,000 Units Intravenous Once     heparin (porcine)  3,000 Units Intravenous Once     insulin aspart  1-6 Units Subcutaneous Q4H      metroNIDAZOLE  500 mg Intravenous Q8H     mycophenolate  500 mg Oral BID IS     NEPHROCAPS  1 capsule Oral Daily     nystatin  1,000,000 Units Swish & Swallow 4x Daily     pantoprazole  40 mg Oral QAM     predniSONE  15 mg Oral BID     rosuvastatin  20 mg Oral Daily     sodium chloride (PF)  3 mL Intracatheter Q8H     sodium chloride (PF)  3 mL Intracatheter Q8H     tacrolimus  1 mg Oral QAM     tacrolimus  2 mg Oral QPM     triamcinolone   Topical BID     Infusion Meds    - MEDICATION INSTRUCTIONS -         ALLERGIES:    Allergies   Allergen Reactions     Norco [Hydrocodone-Acetaminophen] Nausea and Vomiting     Cats      Throat tightness     Isosorbide Other (See Comments)     hypotension     Penicillins Hives     Seasonal Allergies      rhinitis     Shrimp      Throat closes        REVIEW OF SYSTEMS:    Patient notes that he developed mid abdominal pain yesterday following HD accompanied by bloody stools. Had been having frequent brown colored stools up until yesterday. Denies vomiting or hematemesis. Had fever. Had recently completed course of Cefepime for treatment of Pseudomonas bacteremia. Had been receiving Cefepime during OP HD treatments. Has HD at [facility]. Last run 18. Denies CP, dyspnea. No LE edema.     SOCIAL HISTORY:   Single, NS    FAMILY MEDICAL HISTORY:   Family History   Problem Relation Age of Onset     C.A.D. Father       from-never knew father-age 60     Diabetes Father      Cerebrovascular Disease Father      Hypertension No family hx of      Breast Cancer No family hx of      Cancer - colorectal No family hx of      Prostate Cancer No family hx of      KIDNEY DISEASE No family hx of      Melanoma No family hx of      Skin Cancer No family hx of      PHYSICAL EXAM:   Temp  Av.2  F (36.8  C)  Min: 97.1  F (36.2  C)  Max: 100.8  F (38.2  C)      Pulse  Av  Min: 64  Max: 109 Resp  Av.6  Min: 9  Max: 21  SpO2  Av.1 %  Min: 72 %  Max: 100 %       BP (!)  "158/97  Pulse 99  Temp 98.3  F (36.8  C) (Oral)  Resp 16  Ht 1.753 m (5' 9\")  Wt 75 kg (165 lb 5.5 oz)  SpO2 100%  BMI 24.42 kg/m2       Admit Weight: 75 kg (165 lb 5.5 oz)     GENERAL APPEARANCE: fatigued  EYES: no scleral icterus, pupils equal  Pulmonary: lungs CTA. Breathing is non labored.   CV: tachy   - Edema - none  GI: soft, tender in the mid LQ  MS: no evidence of inflammation in joints, no muscle tenderness  : no doyle  SKIN: no rash, warm, dry, no cyanosis  NEURO: alert/oriented  ACCESS: Right TDC    LABS:   CMP  Recent Labs  Lab 08/12/18  0834 08/12/18  0230 08/10/18  1104 08/08/18  0921 08/06/18  0644    137 135 132* 135   POTASSIUM 3.5 3.5 4.8 5.1 4.8   CHLORIDE 101 103 102 99 100   CO2 25 26 24 22 28   ANIONGAP 11 8 9 10 7   * 38* 220* 134* 50*   BUN 24 22 32* 38* 53*   CR 4.01* 3.60* 4.87* 5.01* 6.47*   GFRESTIMATED 15* 17* 12* 12* 9*   GFRESTBLACK 18* 21* 15* 14* 11*   TRINI 7.4* 7.6* 7.7* 8.3* 8.7   MAG 1.4*  --  1.6 1.7 1.6   PHOS 2.6  --  2.8 3.1  --    BILITOTAL  --   --   --  0.4  --    ALKPHOS  --   --   --  156*  --    AST  --   --   --  34  --    ALT  --   --   --  22  --      CBC  Recent Labs  Lab 08/12/18  1253 08/12/18  1010 08/12/18  0834 08/12/18  0230 08/10/18  1104   HGB 7.5* 7.0* 7.4* 8.2* 8.0*   WBC 4.8  --  4.9 4.7 3.6*   RBC 2.52*  --  2.52* 2.78* 2.69*   HCT 23.7*  --  23.2* 25.8* 25.2*   MCV 94  --  92 93 94   MCH 29.8  --  29.4 29.5 29.7   MCHC 31.6  --  31.9 31.8 31.7   RDW 22.2*  --  22.0* 22.0* 21.6*   *  --  131* 136* 174     INR  Recent Labs  Lab 08/12/18  0834 08/12/18  0230   INR 1.31* 1.25*   PTT 37  --      ABGNo lab results found in last 7 days.   URINE STUDIES  Recent Labs   Lab Test  07/28/18   0528  07/18/18   0855  06/25/18   1420  02/05/18   0935   COLOR  Yellow  Yellow  Yellow  Yellow   APPEARANCE  Slightly Cloudy  Cloudy  Slightly Cloudy  Slightly Cloudy   URINEGLC  300*  50*  300*  Negative   URINEBILI  Negative  Negative  Negative  " Small*   URINEKETONE  Negative  5*  Negative  Negative   SG  1.017  1.016  1.013  1.016   UBLD  Trace*  Small*  Negative  Small*   URINEPH  5.5  6.0  5.0  5.5   PROTEIN  30*  100*  30*  100*   NITRITE  Negative  Negative  Negative  Negative   LEUKEST  Negative  Trace*  Small*  Negative   RBCU  3*  4*  3*  1   WBCU  5  12*  9*  4*     Recent Labs   Lab Test  05/17/17   1225  04/19/17   1442  05/19/15   1006  02/12/14   1205  08/14/13   1341  07/08/13   1347  07/03/13   1410   UTPG  0.67*  0.93*  1.77*  2.25*  1.25*  1.04*  1.14*     PTH  Recent Labs   Lab Test  04/09/17   1019  02/10/16   1357  07/03/13   1359  08/24/11   0903  02/23/11   0953   PTHI  110*  247*  91*  59  91*     IRON STUDIES  Recent Labs   Lab Test  07/10/18   0711  05/08/18   0954  07/19/17   1306  07/05/17   1204  06/21/17   1058  05/17/17   1214  04/19/17   1447  04/11/17   0722  03/15/17   1355  02/15/17   1023  01/04/17   1005  12/06/16   1126  11/18/16   0920  10/21/16   0952  09/14/16   1319  08/11/16   0904  06/02/16   0950  05/12/16   1154  04/05/16   1224  02/10/16   1357  12/02/15   1412  11/17/15   1012  09/01/15   1059  07/16/15   0829  06/16/15   1656  05/19/15   1000  05/18/15   1140  04/09/15   0900  01/07/14   1032  09/18/13   1024  08/14/13   1340  07/08/13   1336  07/03/13   1359  02/28/13   0952  02/23/11   0953   IRON  66  58  46  26*  69  42  63  17*  30*  28*  43  34*  34*  77  24*  77  74  53  62  61  109  66  40  57  55  30*  35   --    --    --   23*  <10*  32*  22*  68   FEB  238*  218*  263  228*  237*  210*  213*  176*  232*  215*  243  254  236*  234*  215*  264  233*  242  278  284  280  265  333  331  372  392  380   --    --    --   423  423  443*  425  362   IRONSAT  28  27  18  12*  29  20  30  10*  13*  13*  18  13*  14*  33  11*  29  32  22  22  21  39  25  12*  17  15  8*  9*   --    --    --   5*  <2*  7*  5*  19   ALBERTINA  771*  621*  369  542*  557*  806*  1509*  1194*  860*  432*  663*  460*  505*  420*  359   297  385  536*  620*  261  424*  504*  30  25*  22*  19*   --   19*  38  30  9*  7*  8*  13*   --        IMAGING:  EXAMINATION: CT ABDOMEN PELVIS W CONTRAST, 8/12/2018 9:03 AM     TECHNIQUE:  Helical CT images from the lung bases through the  symphysis pubis were obtained with IV contrast. Contrast dose: 101 cc  of isovue 370     COMPARISON: CT cap 7/26/2018, 6/28/2018, CT abdomen pelvis 6/14/2018     HISTORY: 64 yo s/p heart transplant, recent neutropenia w/colitis on  CT, now w/worsening LUQ/LLQ abdominal pain and bright red blood per  rectum;      FINDINGS:     Abdomen and pelvis: The liver, spleen, and adrenal glands are normal.  Multiple hypoattenuating lesions in the pancreas, most prominently  there is a 12 x 9 mm lesion in the uncinate process (series 3 image  178) which previously measured 13 x 7 mm, and in the proximal body  measuring 18 x 13 mm (series 3 image 101) previously 19 x 13 mm.     Atrophic kidneys. No hydronephrosis or renal calculus. Right midpole  simple renal cyst and additional bilateral too small to characterize  renal cortical hypoattenuating lesions in similar in size infiltration  the prior CT. There are bladder is decompressed. Prostate is  heterogeneous, similar to the prior CT.     Adjacent to the right distal rectum there is a 2.2 x 1.7 x 2.7 cm  fluid collection with a air-fluid level (series 3 image 4 and 38).  Thickening and inflammatory changes of the left colon, similar to the  prior CT. Mild periappendiceal stranding at the base of the appendix,  which is more normal distally, and likely reactive to the parameter  process of the ascending colon/cecum. Inflammatory changes of the at  least 2 segments of the sigmoid colon (in the left upper quadrant  (series 3 image 213) where there is mesenteric fat stranding and bowel  wall thickening, and distal sigmoid colon (series 3 images 332-275,  coronal series 4 image 36). Adjacent to the inflamed distal sigmoid  colon there is a air  and fluid filled collection which extends into  the central abdomen measuring 5.6 x 4.3 x 7.8 cm and abuts loops of  small bowel.     Thick and bowel wall of the first and second portion of the duodenum  with fat attenuation in the wall, similar in appearance to the prior  CT. Small sliding type hernia.     Small amount of free fluid in the pelvis. No free air. No enlarged  abdominal lymph nodes. The portal venous system is patent. Abdominal  aorta is normal caliber.     Lung bases: Trace pericardial effusion. Partially visualized  sternotomy wires. No pleural effusion. New 5 mm pulmonary nodule in  the right lower lobe (series 3 image 39) and 3 mm pulmonary nodule in  the posterior right lower lobe (series 3 image 13).     Bones and soft tissues: Degenerative changes of the hips, including a  large geodes of the hips. No aggressive osseous lesion.         IMPRESSION:   Multiple inflamed loops of bowel:  1 a. Inflamed segment of distal sigmoid colon in the setting of  diverticulosis, most likely diverticulitis. This is complicated by a  pericolonic abscess measuring up to 7.8 cm, which abuts multiple loops  of small bowel.   1 b. Inflamed loop of proximal sigmoid colon, most compatible with  uncomplicated diverticulitis.  1 c. Inflammation of the right colon compatible with colitis,  typhlitis is a consideration setting of neutropenia.  1 e. Unchanged inflammation of the proximal duodenum, with a chronic  appearance. Peptic ulcer disease is a consideration.  2. Small perirectal abscess, measuring up to 2.7 cm.  3. New right lower lobe pulmonary nodules measuring 5 and 3 mm,  respectively. Recommend short-term follow-up CT chest in 3 months.  4. Stable size of multiple stable pancreatic IPMNs.     [Result: Abscess, colitis]     Finding was identified on 8/12/2018 9:33 AM.      Dr. Bearden was contacted by Dr. Gloria at 8/12/2018 10:39 AM and  verbalized understanding of the urgent finding.      I have personally  reviewed the examination and initial interpretation  and I agree with the findings.     JOVANNA PÉREZ MD    EXAMINATION: DOPPLER VENOUS ULTRASOUND OF THE RIGHT UPPER EXTREMITY,  8/12/2018 8:20 AM      COMPARISON: Venous duplex ultrasound 6/22/2018     HISTORY: Evaluate right IJ thrombus     TECHNIQUE:  Gray-scale evaluation with compression, spectral flow and  color Doppler assessment of the deep venous system of the right upper  extremity.     FINDINGS:  Right: The low right IJ is partially compressible. The high right IJ  is fully compressible and patent. Normal blood flow and waveforms are  demonstrated in the innominate, subclavian, and axillary veins.          IMPRESSION:  1.  Unchanged nonocclusive DVT in the low right internal jugular vein.     I have personally reviewed the examination and initial interpretation  and I agree with the findings.     MD Patricia STEPHENS, NP

## 2018-08-12 NOTE — ANESTHESIA PREPROCEDURE EVALUATION
Anesthesia Evaluation     .             ROS/MED HX    ENT/Pulmonary:     (+)sleep apnea, allergic rhinitis, , . .    Neurologic:  - neg neurologic ROS     Cardiovascular: Comment: NICM s/p heart transplant  R internal jugular thrombus on apixiban  Ascending aortic aneurysm    (+) Dyslipidemia, hypertension----. : . . . :. .       METS/Exercise Tolerance:     Hematologic:     (+) History of blood clots pt is anticoagulated, Anemia, -      Musculoskeletal:         GI/Hepatic: Comment: Recent hx BRBPR and rectal abscess  CT 8/12/18: diverticulitis w/ pericolonic abscess measuring up to 7.8cm     (+) GERD       Renal/Genitourinary: Comment: T/Th/Sa HD  Was dialyzed 08/11    (+) chronic renal disease, type: ESRD, Pt requires dialysis, Pt has no history of transplant,       Endo:     (+) type II DM Using insulin Chronic steroid usage for Post Transplant Immunosuppression .      Psychiatric:  - neg psychiatric ROS       Infectious Disease: Comment: Thrush on nystatin, ongoing pseudomonal bacteremia, recently febrile  Receiving zosyn        Malignancy:         Other:    (+) H/O chronic opiod use ,                  Procedure: Procedure(s):  EXAM UNDER ANESTHESIA RECTUM ,COMBINED INCISION AND DRAINAGE RECTUM  - Wound Class: II-Clean Contaminated   - Wound Class: II-Clean Contaminated    HPI: Murray Nicholson is a 63 year old male with PMHx of DM II, ESRD on HD, and recent heart transplant in June 2018 who is undergoing above procedure.    PMHx/PSHx:  Past Medical History:   Diagnosis Date     (HFpEF) heart failure with preserved ejection fraction (H)      Allergic rhinitis, cause unspecified      Anemia of chronic kidney failure      AS (aortic stenosis)      Ascending aortic aneurysm (H)      Bicuspid aortic valve      CAD (coronary artery disease)      Chronic kidney disease, stage 5 (H)      Congestive heart failure, unspecified      Dyslipidemia      Esophageal reflux      ESRD (end stage renal disease) (H)      Hypersomnia  with sleep apnea, unspecified      Hypertension      MGUS (monoclonal gammopathy of unknown significance)      Mitral regurgitation      SHEELA (obstructive sleep apnea)      Systolic heart failure (H)      Type 2 diabetes mellitus (H)        Past Surgical History:   Procedure Laterality Date     COLONOSCOPY N/A 5/3/2018    Procedure: COLONOSCOPY;  colonoscopy ;  Surgeon: Ammon Castillo MD;  Location: [facility]     ESOPHAGOSCOPY, GASTROSCOPY, DUODENOSCOPY (EGD), COMBINED N/A 5/7/2018    Procedure: COMBINED ENDOSCOPIC ULTRASOUND, ESOPHAGOSCOPY, GASTROSCOPY, DUODENOSCOPY (EGD), FINE NEEDLE ASPIRATE/BIOPSY;  Endoscopic Ultrasound with Fine Needle Aspiration ;  Surgeon: Alon Don MD;  Location: [facility]     LAPAROSCOPIC HERNIORRHAPHY INGUINAL BILATERAL Bilateral 7/24/2015    Procedure: LAPAROSCOPIC HERNIORRHAPHY INGUINAL BILATERAL;  Surgeon: Bobby Mcconnell MD;  Location: [facility]     LAPAROSCOPIC INSERTION CATHETER PERITONEAL DIALYSIS N/A 6/22/2017    Procedure: LAPAROSCOPIC INSERTION CATHETER PERITONEAL DIALYSIS;  Laparoscopic Peritoneal Dialysis Catheter Placement - Anesthesia with block;  Surgeon: Esteban Arvizu MD;  Location: [facility]     PICC INSERTION Left 04/22/2018    5Fr - 49cm (3cm external), Basilic vein, low SVC     REMOVE CATHETER PERITONEAL Right 1/15/2018    Procedure: REMOVE CATHETER PERITONEAL;  Open Removal of Peritoneal Dialysis Catheter ;  Surgeon: Esteban Arvizu MD;  Location: [facility]     TRANSPLANT HEART RECIPIENT N/A 6/14/2018    Procedure: TRANSPLANT HEART RECIPIENT;  Median Sternotomy, on-pump oxygenator, Heart Transplant;  Surgeon: Rony Caputo MD;  Location: [facility]         No current facility-administered medications on file prior to encounter.   Current Outpatient Prescriptions on File Prior to Encounter:  acetaminophen (TYLENOL) 325 MG tablet Take 2 tablets (650 mg) by mouth every 4 hours as needed for mild pain (multimodal surgical pain management along with NSAIDS and  opioid medication as indicated based on pain control and physical function.)   albuterol (PROAIR HFA/PROVENTIL HFA/VENTOLIN HFA) 108 (90 Base) MCG/ACT Inhaler Inhale 6 puffs into the lungs every 4 hours as needed for shortness of breath / dyspnea   apixaban ANTICOAGULANT (ELIQUIS) 2.5 MG tablet Take 1 tablet (2.5 mg) by mouth 2 times daily   ASPIRIN 81 MG OR TABS Take 1 tablet (81 mg) by mouth at bedtime   biotin (BIOTIN 5000) 5 MG CAPS Take 5 mg by mouth daily   blood glucose monitoring (ACCU-CHEK FASTCLIX) lancets Use to test blood sugar 2-3 times daily or as directed.  Ok to substitute alternative if insurance prefers.   blood glucose monitoring (NO BRAND SPECIFIED) test strip Use to test blood sugar 2-3 times daily or as directed.   cloNIDine (CATAPRES) 0.1 MG tablet Take 1 tablet (0.1 mg) by mouth At Bedtime   fluticasone (FLONASE) 50 MCG/ACT spray Spray 1-2 sprays into both nostrils daily   hydrALAZINE (APRESOLINE) 25 MG tablet Take 2 tablets (50 mg) by mouth 4 times daily Hold if top number BP less than 110. (Patient taking differently: Take 75 mg by mouth 3 times daily Hold if top number BP less than 110.)   insulin isophane human (HUMULIN N PEN) 100 UNIT/ML injection 30 units in AM and 16 units in the evening. Please check blood sugar four times daily.   lisinopril (PRINIVIL/ZESTRIL) 20 MG tablet Take 1 tablet (20 mg) by mouth daily   loratadine (CLARITIN) 10 MG tablet Take 10 mg by mouth daily as needed Reported on 5/3/2017   melatonin 1 MG TABS tablet Take 2 tablets (2 mg) by mouth nightly as needed for sleep   mycophenolate (GENERIC EQUIVALENT) 250 MG capsule Take 2 capsules (500 mg) by mouth 2 times daily   NEPHROCAPS 1 MG capsule Take 1 capsule by mouth daily   nystatin (MYCOSTATIN) 403154 UNIT/ML suspension Swish and swallow 10 mLs (1,000,000 Units) in mouth 4 times daily   order for DME Equipment being ordered: Com[employer] ()Treatment Diagnosis: ESRD on PDPt has to be connected to PD all night and  can not be disconnected, hence impending his mobility to go to the bathroom. At risk for infection if he does not have this equipment. (Patient not taking: Reported on 7/26/2018)   pantoprazole (PROTONIX) 40 MG EC tablet Take 1 tablet (40 mg) by mouth every morning   pravastatin (PRAVACHOL) 40 MG tablet Take 1 tablet (40 mg) by mouth daily DC after current prescription completed; replace with rosuvastatin.   predniSONE (DELTASONE) 5 MG tablet Take 3 tablets (15 mg) by mouth 2 times daily   rosuvastatin (CRESTOR) 20 MG tablet Take 1 tablet (20 mg) by mouth daily   tacrolimus (GENERIC EQUIVALENT) 1 MG capsule 1 mg in AM and 2 mg at supper. Repeat level Wednesday.   traMADol (ULTRAM) 50 MG tablet Take 1 tablet (50 mg) by mouth every 6 hours as needed for moderate pain   triamcinolone (KENALOG) 0.1 % ointment Apply topically 2 times daily       Social Hx:   Social History   Substance Use Topics     Smoking status: Former Smoker     Packs/day: 1.00     Years: 19.00     Types: Cigarettes     Quit date: 8/18/1994     Smokeless tobacco: Never Used     Alcohol use No       Allergies:   Allergies   Allergen Reactions     Norco [Hydrocodone-Acetaminophen] Nausea and Vomiting     Cats      Throat tightness     Isosorbide Other (See Comments)     hypotension     Penicillins Hives     Seasonal Allergies      rhinitis     Shrimp      Throat closes          NPO Status: Per ASA Guidelines    Labs:    Blood Bank:  Lab Results   Component Value Date    ABO A 08/12/2018    RH Neg 08/12/2018    AS Neg 08/12/2018     BMP:  Recent Labs   Lab Test  08/12/18   1253   NA  136   POTASSIUM  3.8   CHLORIDE  100   CO2  25   BUN  26   CR  4.45*   GLC  131*   TRINI  7.5*     CBC:   Recent Labs   Lab Test  08/12/18   1253   WBC  4.8   RBC  2.52*   HGB  7.5*   HCT  23.7*   MCV  94   MCH  29.8   MCHC  31.6   RDW  22.2*   PLT  111*     Coags:  Recent Labs   Lab Test  08/12/18   0834   INR  1.31*   PTT  37   FIBR  407             Physical Exam  Normal  systems: cardiovascular, pulmonary and dental    Airway   Mallampati: II  TM distance: >3 FB  Neck ROM: full    Dental     Cardiovascular       Pulmonary                     Anesthesia Plan      History & Physical Review  History and physical reviewed and following examination; no interval change.    ASA Status:  4 .        Plan for General, LMA and MAC (Plan to start w MAC, trasition to general if pt does not tolerate) with Intravenous induction. Maintenance will be Balanced.  Reason for MAC:  Deep or markedly invasive procedure (G8)  PONV prophylaxis:  Ondansetron (or other 5HT-3) and Dexamethasone or Solumedrol       Postoperative Care  Postoperative pain management:  Multi-modal analgesia.      Consents  Anesthetic plan, risks, benefits and alternatives discussed with:  Patient..        Angelito Erwin MD  Staff Anesthesiologist  [phone]
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
## 2018-08-12 NOTE — PLAN OF CARE
Problem: Patient Care Overview  Goal: Plan of Care/Patient Progress Review  Outcome: Improving  Condition stable, 25ml D50 for low bgl, hgb stable, will got to IR tomorrow for abcess procedure    Neuro: WDL  CV: stable HR and BP  Pulm: LS clear on RA saturating well  GI: bloody stools overnight, none on days, passing flatus  : no urine on days, ESRD dialysis yesterday, next HD run on Tuesday  Skin: WDL  Pain: Well controlled    Continue to monitor hemodynamics

## 2018-08-12 NOTE — CONSULTS
INTERVENTIONAL RADIOLOGY CONSULT    Mr Nicholson is a 62yo male s/p heart transplant who presented with bloody stools and subjective fevers who was found to have a pericolonic abcess, colitis and perianal abscess on CT.  IR has been consulted for possible drain placement into the 7.8cm pericolonic abscess. Per the primary team, the patient is stable without signs or symptoms of sepsis. The abscess is interposed between sigmoid colon and small bowel.    The case will be review Monday morning and possible drain placement scheduled at that time. This plan was discussed with and agreed upon by the primary team.    Lab Results   Component Value Date    INR 1.31 08/12/2018    INR 1.25 08/12/2018     Lab Results   Component Value Date     08/12/2018     08/12/2018       Please contact the IR charge RN at [phone] for scheduling questions.      Ammon Rao MD  Interventional Radiology Fellow  [phone] after hours

## 2018-08-12 NOTE — IP AVS SNAPSHOT
"    [address] [facility]: [phone]                                              INTERAGENCY TRANSFER FORM - PHYSICIAN ORDERS   2018                    Hospital Admission Date: 2018  SANCHEZ MCNEIL   : 1955  Sex: Male        Attending Provider: Arcelia Blum MD     Allergies:  Norco [Hydrocodone-acetaminophen], Cats, Isosorbide, Penicillins, Seasonal Allergies, Shrimp    Infection:  VRE   Service:  CARDIOLOGY    Ht:  1.753 m (5' 9\")   Wt:  80 kg (176 lb 6.4 oz)   Admission Wt:  75 kg (165 lb 5.5 oz)    BMI:  26.05 kg/m 2   BSA:  1.97 m 2            Patient PCP Information     Provider PCP Type    Yahir Turcios MD General      ED Clinical Impression     Diagnosis Description Comment Added By Time Added    Heart transplant recipient (H) [Z94.1] Heart transplant recipient (H) [Z94.1]  Russel Ramirez MD 2018  2:40 AM    Fever, unspecified fever cause [R50.9] Fever, unspecified fever cause [R50.9]  Russel Ramirez MD 2018  2:41 AM    Chills [R68.83] Chills [R68.83]  Russel Ramirez MD 2018  3:04 AM    Hypoglycemia [E16.2] Hypoglycemia [E16.2]  Russel Ramirez MD 2018  3:04 AM      Hospital Problems as of 2018              Priority Class Noted POA    Bacteremia due to Pseudomonas Medium  2018 Yes    Sigmoid diverticulitis Medium  2018 Yes      Non-Hospital Problems as of 2018              Priority Class Noted    Esophageal reflux Medium  2004    Hypersomnia with sleep apnea Medium  2005    Allergic rhinitis Medium  2006    CKD (chronic kidney disease) stage 5, GFR less than 15 ml/min (H) Medium  2010    Hyperlipidemia LDL goal <100 Medium  10/31/2010    Hypertension goal BP (blood pressure) < 130/80 Medium  2011    Hypertensive cardiopathy Medium  2011    Anemia of chronic disease Medium  2013    Anemia in chronic renal disease Medium  2015    Hypertension Medium  Unknown "    Dyslipidemia Medium  Unknown    MGUS (monoclonal gammopathy of unknown significance) Medium  Unknown    Ascending aortic aneurysm (H) Medium  Unknown    Type 2 diabetes mellitus with diabetic chronic kidney disease (H) Medium  10/14/2015    Anemia, iron deficiency Medium  2/15/2017    Anemia in stage 5 chronic kidney disease (H) Medium  3/17/2017    Heart transplanted (H) Medium  6/15/2018    Leukopenia Medium  7/11/2018    Heart transplant recipient (H) Medium  7/26/2018    Fever Medium  7/26/2018      Code Status History     Date Active Date Inactive Code Status Order ID Comments User Context    9/11/2018  4:52 PM  Full Code [id number]  Kristi Ayon NP Outpatient    8/15/2018  5:13 AM 9/11/2018  4:52 PM Full Code [id number]  Rick Tran MD Inpatient    8/12/2018  5:01 AM 8/15/2018  5:13 AM Full Code [id number]  Naresh Phelan MD Inpatient    8/10/2018 11:04 AM 8/10/2018  3:25 PM Full Code [id number]  Lillei Ulloa RN Inpatient    8/6/2018  4:26 PM 8/10/2018 11:04 AM Full Code [id number]  Jennifer Dean APRN CNP Outpatient    8/2/2018  4:05 PM 8/6/2018  4:26 PM Full Code [id number]  Lillie Ulloa RN Inpatient    7/26/2018 11:44 PM 8/2/2018  4:05 PM Full Code [id number]  Antony Mejía MD Inpatient    7/5/2018  7:32 AM 7/5/2018 12:22 PM Full Code [id number]  Lillie Ulloa RN Inpatient    7/2/2018  1:29 PM 7/5/2018  7:32 AM Full Code [id number]  Mary Alice Andre, APRN CNP Outpatient    6/15/2018 12:11 AM 7/2/2018  1:29 PM Full Code [id number]  Angelito Chance MD Inpatient    5/13/2018  6:49 PM 6/15/2018 12:11 AM Full Code [id number]  Paulina Guzman MD Inpatient    4/17/2018  7:31 PM 5/13/2018  6:49 PM Full Code [id number]  Sharonda Miguel MD Inpatient    3/28/2018  1:20 PM 3/28/2018  2:53 PM Full Code [id number]  Lottie Carpenter RN Inpatient    2/2/2018  9:48 PM 2/14/2018  6:27 PM Full Code [id number]  Evangelina Yu MD Inpatient    1/20/2018  3:06 PM 2/2/2018  9:48 PM  Full Code [id number]  Naresh Aguayo MD Outpatient    1/7/2018 10:48 PM 1/20/2018  3:06 PM Full Code [id number]  Eloise Noel MD Inpatient    11/14/2017  4:52 PM 1/7/2018 10:48 PM Full Code [id number]  Rosaline Mary MD Outpatient    11/13/2017  9:58 PM 11/14/2017  4:52 PM Full Code [id number]  Marilyn Rosario MD Inpatient    6/26/2017  8:38 PM 11/13/2017  9:58 PM Full Code [id number]  Ragini Marks MD Outpatient    6/26/2017  5:28 AM 6/26/2017  8:38 PM Full Code [id number]  Oh Lawson MD Inpatient    6/22/2017 10:43 AM 6/26/2017  5:28 AM Full Code [id number]  Jennifer Huffman MD Outpatient    4/13/2017  1:23 PM 6/22/2017 10:43 AM Full Code [id number]  Matilda Torres MD Outpatient    4/8/2017 10:04 PM 4/13/2017  1:23 PM Full Code [id number]  Kenneth Gaston MD Inpatient    6/4/2004  1:50 PM 6/4/2004  1:50 PM  None  Peri [place] Demographics         Medication Review      START taking        Dose / Directions Comments    amLODIPine 10 MG tablet   Commonly known as:  NORVASC   Used for:  Hypertension goal BP (blood pressure) < 130/80        Dose:  10 mg   Take 1 tablet (10 mg) by mouth daily   Quantity:  30 tablet   Refills:  0        glucagon 1 MG Solr injection   Used for:  Hypoglycemia        Dose:  1 mg   Inject 1 mg Subcutaneous every 15 minutes as needed for low blood sugar (May repeat x 1 only)   Refills:  0        glucose 40 % (400 mg/mL) Gel gel   Used for:  Hypoglycemia        Dose:  15-30 g   Take 15-30 g by mouth every 15 minutes as needed for low blood sugar   Refills:  0        * insulin aspart 100 UNIT/ML injection   Commonly known as:  NovoLOG PEN   Used for:  Type 2 diabetes mellitus with hyperosmolarity without coma, with long-term current use of insulin (H)        Dose:  1-6 Units   Inject 1-6 Units Subcutaneous 3 times daily (with meals)   Refills:  0        * insulin aspart 100 UNIT/ML injection   Commonly known as:  NovoLOG PEN   Used for:   Type 2 diabetes mellitus with hypoglycemia without coma, unspecified long term insulin use status (H)        Dose:  1-5 Units   Inject 1-5 Units Subcutaneous At Bedtime   Refills:  0        insulin glargine 100 UNIT/ML injection   Commonly known as:  LANTUS        Dose:  16 Units   Start taking on:  9/12/2018   Inject 16 Units Subcutaneous every morning (before breakfast)   Refills:  0        pentamidine 300 MG neb solution   Commonly known as:  NEBUPENT   Indication:  PJP prophylaxis post OHT   Used for:  Heart replaced by transplant (H)        Dose:  300 mg   Start taking on:  9/14/2018   Inhale 300 mg into the lungs once for 1 dose   Quantity:  300 mg   Refills:  0        polyethylene glycol Packet   Commonly known as:  MIRALAX/GLYCOLAX   Used for:  Constipation, unspecified constipation type        Dose:  17 g   Take 17 g by mouth daily as needed for constipation   Quantity:  7 packet   Refills:  0        simethicone 80 MG chewable tablet   Commonly known as:  MYLICON   Used for:  Flatulence, eructation and gas pain        Dose:  80 mg   Take 1 tablet (80 mg) by mouth every 6 hours as needed for cramping   Quantity:  180 tablet   Refills:  0        * sodium chloride (PF) 0.9% PF flush        Dose:  3 mL   3 mLs by Intracatheter route every hour as needed for line flush (for peripheral IV flush post IV meds)   Refills:  0        * sodium chloride (PF) 0.9% PF flush        Dose:  3 mL   3 mLs by Intracatheter route every 8 hours   Refills:  0        Urea 40 % Crea   Used for:  Brittle nails        Externally apply topically daily   Quantity:  85 g   Refills:  1        * Notice:  This list has 4 medication(s) that are the same as other medications prescribed for you. Read the directions carefully, and ask your doctor or other care provider to review them with you.      CONTINUE these medications which may have CHANGED, or have new prescriptions. If we are uncertain of the size of tablets/capsules you have at home,  strength may be listed as something that might have changed.        Dose / Directions Comments    hydrALAZINE 100 MG Tabs tablet   Commonly known as:  APRESOLINE   This may have changed:    - medication strength  - how much to take  - when to take this   Used for:  Essential hypertension        Dose:  100 mg   Take 1 tablet (100 mg) by mouth every 8 hours   Quantity:  60 tablet   Refills:  0        lisinopril 2.5 MG tablet   Commonly known as:  PRINIVIL/Zestril   This may have changed:    - medication strength  - how much to take   Used for:  Heart transplant recipient (H)        Dose:  2.5 mg   Take 1 tablet (2.5 mg) by mouth daily   Refills:  0        predniSONE 5 MG tablet   Commonly known as:  DELTASONE   This may have changed:    - how much to take  - when to take this   Used for:  Heart transplant recipient (H)        Dose:  5 mg   Take 1 tablet (5 mg) by mouth 2 times daily (with meals)   Refills:  0        tacrolimus 1 MG capsule   Commonly known as:  GENERIC EQUIVALENT   This may have changed:    - how much to take  - how to take this  - when to take this  - additional instructions   Used for:  Heart transplant recipient (H)        Dose:  2 mg   Take 2 capsules (2 mg) by mouth 2 times daily   Refills:  0        traMADol 50 MG tablet   Commonly known as:  ULTRAM   This may have changed:  when to take this   Used for:  Moderate pain        Dose:  50 mg   Take 1 tablet (50 mg) by mouth every 12 hours as needed for moderate pain   Quantity:  10 tablet   Refills:  0          CONTINUE these medications which have NOT CHANGED        Dose / Directions Comments    acetaminophen 325 MG tablet   Commonly known as:  TYLENOL   Used for:  Acute post-operative pain        Dose:  650 mg   Take 2 tablets (650 mg) by mouth every 4 hours as needed for mild pain (multimodal surgical pain management along with NSAIDS and opioid medication as indicated based on pain control and physical function.)   Quantity:  100 tablet    Refills:  0        albuterol 108 (90 Base) MCG/ACT inhaler   Commonly known as:  PROAIR HFA/PROVENTIL HFA/VENTOLIN HFA        Dose:  2 puff   Inhale 2 puffs into the lungs every 4 hours as needed for shortness of breath / dyspnea or wheezing   Refills:  0        aspirin 81 MG tablet        Take 1 tablet (81 mg) by mouth at bedtime   Refills:  0        biotin 5 MG Caps   Commonly known as:  BIOTIN 5000   Used for:  Brittle nails        Dose:  5 mg   Take 5 mg by mouth daily   Quantity:  30 capsule   Refills:  3        blood glucose monitoring lancets   Used for:  Type 2 diabetes mellitus with stage 5 chronic kidney disease not on chronic dialysis, without long-term current use of insulin (H)        Use to test blood sugar 2-3 times daily or as directed.  Ok to substitute alternative if insurance prefers.   Quantity:  102 each   Refills:  11        blood glucose monitoring test strip   Commonly known as:  no brand specified   Used for:  Type 2 diabetes mellitus with stage 5 chronic kidney disease not on chronic dialysis, without long-term current use of insulin (H)        Use to test blood sugar 2-3 times daily or as directed.   Quantity:  100 each   Refills:  11        fluticasone 50 MCG/ACT spray   Commonly known as:  FLONASE   Used for:  Nasal congestion        Dose:  1-2 spray   Spray 1-2 sprays into both nostrils daily   Quantity:  16 g   Refills:  11        loratadine 10 MG tablet   Commonly known as:  CLARITIN   Used for:  Hypertensive cardiopathy, SOB (shortness of breath)        Dose:  10 mg   Take 10 mg by mouth daily Reported on 5/3/2017   Refills:  0        melatonin 1 MG Tabs tablet   Used for:  Heart transplanted (H)        Dose:  2 mg   Take 2 tablets (2 mg) by mouth nightly as needed for sleep   Quantity:  120 tablet   Refills:  1        NEPHROCAPS 1 MG capsule        Dose:  1 capsule   Take 1 capsule by mouth daily   Quantity:  120 capsule   Refills:  0        nystatin 918216 UNIT/ML suspension    Commonly known as:  MYCOSTATIN   Used for:  Heart transplant recipient (H)        Dose:  4005670 Units   Swish and swallow 10 mLs (1,000,000 Units) in mouth 4 times daily   Quantity:  400 mL   Refills:  2        order for DME   Used for:  CKD (chronic kidney disease) stage 5, GFR less than 15 ml/min (H)        Equipment being ordered: Carol () Treatment Diagnosis: ESRD on PD Pt has to be connected to PD all night and can not be disconnected, hence impending his mobility to go to the bathroom. At risk for infection if he does not have this equipment.   Quantity:  1 Units   Refills:  0        pantoprazole 40 MG EC tablet   Commonly known as:  PROTONIX   Used for:  Heart transplant recipient (H)        Dose:  40 mg   Take 1 tablet (40 mg) by mouth every morning   Quantity:  30 tablet   Refills:  11        rosuvastatin 20 MG tablet   Commonly known as:  CRESTOR   Used for:  Heart transplant recipient (H)        Dose:  20 mg   Take 1 tablet (20 mg) by mouth daily   Quantity:  30 tablet   Refills:  1    Replaces Pravastatin with next fill.       triamcinolone 0.1 % ointment   Commonly known as:  KENALOG   Used for:  Xerosis of skin        Apply topically 2 times daily   Quantity:  80 g   Refills:  0          STOP taking     apixaban ANTICOAGULANT 2.5 MG tablet   Commonly known as:  ELIQUIS           cloNIDine 0.1 MG tablet   Commonly known as:  CATAPRES           mycophenolate 250 MG capsule   Commonly known as:  GENERIC EQUIVALENT           pravastatin 40 MG tablet   Commonly known as:  PRAVACHOL                     Further instructions from your care team         [facility]     Name: Murray Nicholson  Date: 9/4/18    To order your ostomy supplies    The ostomy Supplier needs this supply list  to process your order. You will need to fax/deliver this list, along with your Insurance information. Your home care nurse can assist with this process.  List of Ostomy Distributors      [employer]  Ph. [phone] Fax # [phone]  [facility]   Ph. [phone] [phone]  [facility]   Ph. [phone]  [facility]   Ph. [phone] [phone]  Or Call your insurance provider for their preferred supplier    Your Medical Supplier will need your surgeon's name, phone and fax number    Clinic:                     Phone                            Fax  Colorectal Surgery:    [phone]  Verbal Order for ostomy supplies for 1 Month per:  Karly Jon RN CWON       Authorizing MD: Dr. Tran  Change pouch :                            Three times a week  Quantity of pouches-#15/mo    Request the following supplies:      Shashi    1 piece flat fecal with filter #4231    or                Coloplast   Sensura Uriel  1 piece transparent flat pouch with filter #27331                              Accessories  2  barrier ring #7805     Adapt paste #74131     Adapt powder #7906    No sting film barrier # 3345                          Adapt odor eliminator and lubricant 236ml bottle # 27255     M-9 Spray room deodorizer #7732                           Brava elastic barrier strips curved # [id number]                                                         Change your pouch twice a week, more often if leaking.    If you are cutting a hole in the wafer of your pouch, recheck stoma size and adjust pouch opening as needed every week    . Call the Ostomy Nurse at [facility]       [address] [place] [address], [place] : [phone]   Schedule a follow-up visit in 4 to 6 weeks after your surgery, sooner if having problems Bring a complete set of pouch-changing supplies to this visit      Problems you should Report  - The stoma turns blue or darker in color.  - Cuts or sores around the stoma.  - Red, raw or painful skin around the stoma.  - Any bulging of the skin around the stoma.  - A pouch that leaks every day.  - Problems making the right  size hole in the pouch wafer.    Please call with any questions or concerns.      Summary of Visit     Reason for your hospital stay       You were hospitalized for abdominal pain and an active bleed due to rectal and colonic infection.             After Care     Activity - Ambulate in hallway       Every shift       Activity - Up ad edith           Advance Diet as Tolerated       Follow this diet upon discharge: Orders Placed This Encounter      Calorie Counts      Snacks/Supplements Adult: Other; Please offer Boost or Nepro with meals.; With Meals      Snacks/Supplements Adult: Boost Plus; Between Meals      Calorie Counts      Regular Diet Adult       Daily weights       Call Provider for weight gain of more than 2 pounds per day or 5 pounds per week.       General info for SNF       Length of Stay Estimate: Short Term Care: Estimated # of Days <30  Condition at Discharge: Improving  Level of care:skilled   Rehabilitation Potential: Good  Admission H&P remains valid and up-to-date: Yes  Recent Chemotherapy: N/A  Use Nursing Home Standing Orders: Yes       Glucose monitor nursing POCT       Before meals and at bedtime       Intake and output       Every shift       Mantoux instructions       Give two-step Mantoux (PPD) Per Facility Policy Yes if not completed previously       Ostomy care       Standard Cares.  Type:  Colostomy.       Weight bearing status                 Referrals     Medication Therapy Management Referral       MTM referral reason            Patient had a hospital or ED visit in last 6 months and has more than 10   PTA or Discharge medications    Patient has 5 PTA or Discharge Medications AND one of the following   diagnoses: DM,HF,COPD,AMI DX,PULM HTN       This service is designed to help you get the most from your medications.  A specially trained pharmacist will work closely with you and your doctors  to solve any problems related to your medications and to help you get the   best results from  taking them.      The Medication Therapy Management staff will call you to schedule an appointment.       Occupational Therapy Adult Consult       Evaluate and treat as clinically indicated.    Reason: Continue therapy to help facilitate more IADL's.       Physical Therapy Adult Consult       Evaluate and treat as clinically indicated.    Reason:  Deconditioning s/p heart transplant             Other Orders     Contact Isolation                 Your next 10 appointments already scheduled     Oct 08, 2018 10:00 AM CDT   LAB with UU LAB GOLD WAITING   [facility] [place], Lab ([facility])    [address] [facility] [address]              Please do not eat 10-12 hours before your appointment if you are coming in fasting for labs on lipids, cholesterol, or glucose (sugar). This does not apply to pregnant women. Water, hot tea and black coffee (with nothing added) are okay. Do not drink other fluids, diet soda or chew gum.            Oct 08, 2018 10:30 AM CDT   Procedure - 2.5 hour with U2A ROOM 17   [address] [facility] [place] ([facility])    [address] [facility] [address]               Oct 08, 2018 11:30 AM CDT   Heart Cath Heart Biopsy with [id number]   [facility] ([facility])    [address] [facility] [address]   [phone]            Oct 08, 2018  4:00 PM CDT   (Arrive by 3:45 PM)   RETURN HEART TRANSPLANT with Klever Ernst MD   [facility] ([facility])    [address] [facility]  [address] [place] [id number] [phone]            Oct 12, 2018  8:00 AM CDT   (Arrive by 7:45 AM)   RETURN HEART TRANSPLANT with VERONICA Rocha CNP   [facility] ([facility])    [address]  [address]  [facility] [address] [phone]            Nov 05, 2018  8:00 AM CST    LAB with UU LAB GOLD WAITING   [facility]    [address] [facility] [address]              Please do not eat 10-12 hours before your appointment if you are coming in fasting for labs on lipids, cholesterol, or glucose (sugar). This does not apply to pregnant women. Water, hot tea and black coffee (with nothing added) are okay. Do not drink other fluids, diet soda or chew gum.            Nov 05, 2018  8:30 AM CST   Procedure - 2.5 hour with U2A ROOM 17   [address]A [facility] ([facility])    [address] [facility] [address]               Nov 05, 2018  9:30 AM CST   Heart Cath Heart Biopsy with UUHCVR3   [facility]    [address] [facility] [address] [phone]            Nov 05, 2018  4:30 PM CST   (Arrive by 4:15 PM)   RETURN HEART TRANSPLANT with Klever Ernst MD   [facility] ([facility])    [address] [facility]  [address] [place] [id number] [phone]            Nov 09, 2018  8:30 AM CST   (Arrive by 8:15 AM)   RETURN HEART TRANSPLANT with Cleo Marks NP   [facility] ([facility])    [address] [place] [address]  [address] [place] [id number] [phone]              Follow-Up Appointment Instructions     Future Labs/Procedures    Follow Up and recommended labs and tests     Comments:    [facility]   Phone: [phone]   Fax: [phone]     For resumption of outpatient dialysis Tuesday, Thursday, Saturday.    Follow Up and recommended labs and tests     Comments:    CBC and BMP on dialysis days.  Weekly CMV PCR's.   Weekly Tacro levels.  Goal 6-8.      Follow-Up Appointment Instructions     Follow Up and recommended labs and tests       [facility]    Phone: [phone]   Fax: [phone]     For resumption of outpatient dialysis Tuesday, Thursday, Saturday.       Follow Up and recommended labs and tests       CBC and BMP on dialysis days.  Weekly CMV PCR's.   Weekly Tacro levels.  Goal 6-8.             Statement of Approval     Ordered          09/11/18 5812  I have reviewed and agree with all the recommendations and orders detailed in this document.  EFFECTIVE NOW     Approved and electronically signed by:  Kristi Ayon, NP

## 2018-08-12 NOTE — IP AVS SNAPSHOT
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 seen: Routine within 24 hours; Call back #: *[phone]; 64 yo recent heart transplant with 7.8 cm daniella-colonic abscess, ?drainage [[id number]] ordered by Bobby Bearden MD at 08/12/18 1155                Mr Nicholson is a 62yo male s/p heart transplant who presented with bloody stools and subjective fevers who was found to have a pericolonic abcess, colitis and perianal abscess on CT.  IR has been consulted for possible drain placement into the 7.8cm pericolonic abscess. Per the primary team, the patient is stable without signs or symptoms of sepsis. The abscess is interposed between sigmoid colon and small bowel.     Imaging reviewed in AM rounds today and no safe window for drain placement available. IR recommends continued medical treatment and possible surgical options per recs from colorectal. Please consult IR if collection increases or changes in size where a window for safe placement may be possible.     Case discussed with Dr. Pickens and colorectal team [phone].    Ariadna Boston PA-C  Interventional Radiology[MN1.1]     Revision History        User Key Date/Time User Provider Type Action     > MN1.1 8/13/2018 10:17 AM Ariadna Boston PA-C Physician Assistant - C Sign            Consults by Fatimah Sorto MD at 8/12/2018 10:55 AM     Author:  Fatimah Sorto MD Service:  Colorectal Surgery Author Type:  Resident    Filed:  8/12/2018  1:39 PM Date of Service:  8/12/2018 10:55 AM Creation Time:  8/12/2018 10:55 AM    Status:  Attested :  Fatimah Sorto MD (Resident)    Cosigner:  Rick Tran MD at 8/12/2018  7:13 PM         Consult Orders:    1. Colorectal Surgery Adult IP Consult: Patient to be seen: Routine within 24 hrs; Call back #: [phone]; pericolonic abscess; Consultant may enter orders: Yes [[id number]] ordered by Juan Mckay MD at 08/12/18 1052           Attestation signed by Rick Tran MD at 8/12/2018  7:13 PM (Updated)        Attestation:  Physician Attestation   I, Rick Tran, saw this patient with the resident and agree with the resident and/or medical student's findings and plan of care as documented in the note.      I personally reviewed vital signs, medications, labs and imaging.    Key findings: First episode of diverticulitis with para-colonic abscess and Pseudomonas bacteremia in the setting of immunosuppression after recent heart TX. PMH also significant for ESRD (on HD), anemia, p/c malnutrition, DM2 and int jugular DVT (received apixaban yesterday). AVSS. Abd exam soft with LLQ point tenderness. No diffuse rebound. WBC NL. CT reviewed, LLQ 8-cm paracolonic abscess. Appears to be surrounded by SB, and may have a component of intramural abscess with stool in its interior. Sigmoid appears to be moderately inflamed, but there is some colon wall inflammation involving the ascending colon as well. Co-incidentally there is a right-sided anorectal abscess as well. His anorectal exam confirmed this.     Most likely diverticulitis but may also be perforated stercoral ulcer, CMV colitis, and ischemic colitis. Colonoscopy in 5/2018 showed  sigmoid diverticulosis, otherwise normal. No FH of CRC. Discussed options of possible percutaneous drainage of abscess with prolonged IV antibiotics versus sigmoidectomy with end colostomy with their inherent risks and benefits. Given benign clinical status and recent anticoagulation I recommended proceeding with percutaneous drainage. He agrees with this plan. He does understand that if abscess is not amenable to IR drainage, he fails to clinically improve, he develops a persistent colocutaneous fistula, or his abscess does not resolve that he would require sigmoidectomy +/- stoma. Regarding his anorectal abscess, I would recommend I&D in the OR today. He understands this well. All questions were answered to his satisfaction.    Rick Tran MD  Date of Service (when I saw the patient): 08/12/18                               Colorectal Surgery Consultation Note    Murray Nicholson  MRN: [id number]  male  63 year old    Chief Complaint:  Pericolonic abscess    HPI:  63 year old male h/o NICM s/p heart TXP 6/2018[HJ1.1] on immunosuppressants[HJ1.2], ESRD on HD, R internal jugular thrombus on apixabam, T2DM[HJ1.1], ongoing pseudomonal bacteremia[HJ1.2] admitted to cards service for 3-4[HJ1.1] intermittent[HJ1.2] episodes of[HJ1.1] painless[HJ1.2] BRBPR[HJ1.1] that began last night[HJ1.2] w/ associated crampy lower abd[HJ1.1]/pelvic[HJ1.2] pain (improved after pepto bismol + Tylenol), subjective fevers/pills.[HJ1.1] No h/o hemorrhoids.[HJ1.2] Denies[HJ1.1] light-headedness/dizziness, N/V, SOB/CP, problems w/ urination[HJ1.2].[HJ1.1] BMs have been regular. Last colonoscopy 5/2018 which showed diverticulosis in sigmoid, otherwise normal. C[HJ1.2]T obtained today shows likely diverticulitis w/ pericolonic abscess measuring up to 7.8cm - colorectal surgery consulted for eval.     Patient Active Problem List   Diagnosis     Esophageal reflux     Hypersomnia with sleep apnea     Allergic rhinitis     CKD (chronic  kidney disease) stage 5, GFR less than 15 ml/min (H)     Hyperlipidemia LDL goal <100     Hypertension goal BP (blood pressure) < 130/80     Hypertensive cardiopathy     Anemia of chronic disease     Anemia in chronic renal disease     Hypertension     Dyslipidemia     MGUS (monoclonal gammopathy of unknown significance)     Ascending aortic aneurysm (H)     Type 2 diabetes mellitus with diabetic chronic kidney disease (H)     Anemia, iron deficiency     Anemia in stage 5 chronic kidney disease (H)     Heart transplanted (H)     Leukopenia     Heart transplant recipient (H)     Fever     Bacteremia due to Pseudomonas     Past Surgical History:   Past Surgical History:   Procedure Laterality Date     COLONOSCOPY N/A 5/3/2018    Procedure: COLONOSCOPY;  colonoscopy ;  Surgeon: Ammon Castillo MD;  [facility]: [facility]     ESOPHAGOSCOPY, GASTROSCOPY, DUODENOSCOPY (EGD), COMBINED N/A 5/7/2018    Procedure: COMBINED ENDOSCOPIC ULTRASOUND, ESOPHAGOSCOPY, GASTROSCOPY, DUODENOSCOPY (EGD), FINE NEEDLE ASPIRATE/BIOPSY;  Endoscopic Ultrasound with Fine Needle Aspiration ;  Surgeon: Alon Don MD;  [facility]     LAPAROSCOPIC HERNIORRHAPHY INGUINAL BILATERAL Bilateral 7/24/2015    Procedure: LAPAROSCOPIC HERNIORRHAPHY INGUINAL BILATERAL;  Surgeon: Bobby Mcconnell MD;  Location: [facility]     LAPAROSCOPIC INSERTION CATHETER PERITONEAL DIALYSIS N/A 6/22/2017    Procedure: LAPAROSCOPIC INSERTION CATHETER PERITONEAL DIALYSIS;  Laparoscopic Peritoneal Dialysis Catheter Placement - Anesthesia with block;  Surgeon: Esteban Arvizu MD;  [facility]: [facility]     PICC INSERTION Left 04/22/2018    5Fr - 49cm (3cm external), Basilic vein, low SVC     REMOVE CATHETER PERITONEAL Right 1/15/2018    Procedure: REMOVE CATHETER PERITONEAL;  Open Removal of Peritoneal Dialysis Catheter ;  Surgeon: Esteban Arvizu MD;  [facility]: [facility]     TRANSPLANT HEART RECIPIENT N/A 6/14/2018    Procedure: TRANSPLANT HEART RECIPIENT;  Median  Sternotomy, on-pump oxygenator, Heart Transplant;  Surgeon: Rony Caputo MD;  Location:  OR     Past Medical History:   Past Medical History:   Diagnosis Date     (HFpEF) heart failure with preserved ejection fraction (H)      Allergic rhinitis, cause unspecified      Anemia of chronic kidney failure      AS (aortic stenosis)      Ascending aortic aneurysm (H)      Bicuspid aortic valve      CAD (coronary artery disease)      Chronic kidney disease, stage 5 (H)      Congestive heart failure, unspecified      Dyslipidemia      Esophageal reflux      ESRD (end stage renal disease) (H)      Hypersomnia with sleep apnea, unspecified      Hypertension      MGUS (monoclonal gammopathy of unknown significance)      Mitral regurgitation      SHEELA (obstructive sleep apnea)      Systolic heart failure (H)      Type 2 diabetes mellitus (H)      Social History:   Social History   Substance Use Topics     Smoking status: Former Smoker     Packs/day: 1.00     Years: 19.00     Types: Cigarettes     Quit date: 1994     Smokeless tobacco: Never Used     Alcohol use No     Family History:   Family History   Problem Relation Age of Onset     C.A.D. Father       from-never knew father-age 60     Diabetes Father      Cerebrovascular Disease Father      Hypertension No family hx of      Breast Cancer No family hx of      Cancer - colorectal No family hx of      Prostate Cancer No family hx of      KIDNEY DISEASE No family hx of      Melanoma No family hx of      Skin Cancer No family hx of       Allergies:   Allergies   Allergen Reactions     Norco [Hydrocodone-Acetaminophen] Nausea and Vomiting     Cats      Throat tightness     Isosorbide Other (See Comments)     hypotension     Penicillins Hives     Seasonal Allergies      rhinitis     Shrimp      Throat closes      Active Medications:   No current outpatient prescriptions on file.     ROS:  Otherwise negative    Physical Examination:   Vital Signs: /77   "Pulse 99  Temp 98.1  F (36.7  C) (Oral)  Resp 16  Ht 1.753 m (5' 9\")  Wt 75 kg (165 lb 5.5 oz)  SpO2 100%  BMI 24.42 kg/m2  GEN:[HJ1.1] sleepy, able to converse but frequently fell back asleep, NAD[HJ1.2]  EENT: normal  Chest: NLB on[HJ1.1] RA[HJ1.2], regular rate  Abdomen: soft,[HJ1.1] non-[HJ1.2]distended[HJ1.1], minimally tender to deep palpation b/l lower quadrants & pelvis  : unremarkable rectal exam - no masses, hemorrhoids, bleeding noted; non-tender[HJ1.2]  Extremities: WWP, no edema    Labs/Imaging/Other:[HJ1.1]  Results for orders placed or performed during the hospital encounter of 08/12/18 (from the past 24 hour(s))   CBC with platelets differential   Result Value Ref Range    WBC 4.7 4.0 - 11.0 10e9/L    RBC Count 2.78 (L) 4.4 - 5.9 10e12/L    Hemoglobin 8.2 (L) 13.3 - 17.7 g/dL    Hematocrit 25.8 (L) 40.0 - 53.0 %    MCV 93 78 - 100 fl    MCH 29.5 26.5 - 33.0 pg    MCHC 31.8 31.5 - 36.5 g/dL    RDW 22.0 (H) 10.0 - 15.0 %    Platelet Count 136 (L) 150 - 450 10e9/L    Diff Method Automated Method     % Neutrophils 78.3 %    % Lymphocytes 7.6 %    % Monocytes 9.9 %    % Eosinophils 0.2 %    % Basophils 0.2 %    % Immature Granulocytes 3.8 %    Nucleated RBCs 2 (H) 0 /100    Absolute Neutrophil 3.7 1.6 - 8.3 10e9/L    Absolute Lymphocytes 0.4 (L) 0.8 - 5.3 10e9/L    Absolute Monocytes 0.5 0.0 - 1.3 10e9/L    Absolute Eosinophils 0.0 0.0 - 0.7 10e9/L    Absolute Basophils 0.0 0.0 - 0.2 10e9/L    Abs Immature Granulocytes 0.2 0 - 0.4 10e9/L    Absolute Nucleated RBC 0.1    INR   Result Value Ref Range    INR 1.25 (H) 0.86 - 1.14   ABO/Rh type and screen   Result Value Ref Range    ABO A     RH(D) Neg     Antibody Screen Neg     Test Valid Only At          [facility]    Specimen Expires 08/15/2018    Basic metabolic panel   Result Value Ref Range    Sodium 137 133 - 144 mmol/L    Potassium 3.5 3.4 - 5.3 mmol/L    Chloride 103 94 - 109 mmol/L    Carbon " Dioxide 26 20 - 32 mmol/L    Anion Gap 8 3 - 14 mmol/L    Glucose 38 (LL) 70 - 99 mg/dL    Urea Nitrogen 22 7 - 30 mg/dL    Creatinine 3.60 (H) 0.66 - 1.25 mg/dL    GFR Estimate 17 (L) >60 mL/min/1.7m2    GFR Estimate If Black 21 (L) >60 mL/min/1.7m2    Calcium 7.6 (L) 8.5 - 10.1 mg/dL   Lactic acid whole blood   Result Value Ref Range    Lactic Acid 1.4 0.7 - 2.0 mmol/L   Blood culture   Result Value Ref Range    Specimen Description Blood Right Hand     Special Requests Received in aerobic bottle only     Culture Micro PENDING    CRP inflammation   Result Value Ref Range    CRP Inflammation 76.0 (H) 0.0 - 8.0 mg/L   Erythrocyte sedimentation rate auto   Result Value Ref Range    Sed Rate 72 (H) 0 - 20 mm/h   Blood culture   Result Value Ref Range    Specimen Description Blood Left Arm     Special Requests Received in aerobic bottle only     Culture Micro PENDING    Glucose by meter   Result Value Ref Range    Glucose 95 70 - 99 mg/dL   Glucose by meter   Result Value Ref Range    Glucose 73 70 - 99 mg/dL   Glucose by meter   Result Value Ref Range    Glucose 58 (L) 70 - 99 mg/dL   Glucose by meter   Result Value Ref Range    Glucose 129 (H) 70 - 99 mg/dL   Glucose by meter   Result Value Ref Range    Glucose 117 (H) 70 - 99 mg/dL   Wound Culture Aerobic Bacterial   Result Value Ref Range    Specimen Description Mouth     Culture Micro PENDING    Blood culture   Result Value Ref Range    Specimen Description Blood DIALYSIS PORT     Special Requests Received in aerobic bottle only     Culture Micro PENDING    Glucose by meter   Result Value Ref Range    Glucose 111 (H) 70 - 99 mg/dL   US Upper Extremity Venous Duplex Right Portable    Narrative    EXAMINATION: DOPPLER VENOUS ULTRASOUND OF THE RIGHT UPPER EXTREMITY,  8/12/2018 8:20 AM     COMPARISON: Venous duplex ultrasound 6/22/2018    HISTORY: Evaluate right IJ thrombus    TECHNIQUE:  Gray-scale evaluation with compression, spectral flow and  color Doppler  assessment of the deep venous system of the right upper  extremity.    FINDINGS:  Right: The low right IJ is partially compressible. The high right IJ  is fully compressible and patent. Normal blood flow and waveforms are  demonstrated in the innominate, subclavian, and axillary veins.       Impression    IMPRESSION:  1.  Unchanged nonocclusive DVT in the low right internal jugular vein.    I have personally reviewed the examination and initial interpretation  and I agree with the findings.    JOVANNA PÉREZ MD   CBC with platelets   Result Value Ref Range    WBC 4.9 4.0 - 11.0 10e9/L    RBC Count 2.52 (L) 4.4 - 5.9 10e12/L    Hemoglobin 7.4 (L) 13.3 - 17.7 g/dL    Hematocrit 23.2 (L) 40.0 - 53.0 %    MCV 92 78 - 100 fl    MCH 29.4 26.5 - 33.0 pg    MCHC 31.9 31.5 - 36.5 g/dL    RDW 22.0 (H) 10.0 - 15.0 %    Platelet Count 131 (L) 150 - 450 10e9/L   Basic metabolic panel   Result Value Ref Range    Sodium 138 133 - 144 mmol/L    Potassium 3.5 3.4 - 5.3 mmol/L    Chloride 101 94 - 109 mmol/L    Carbon Dioxide 25 20 - 32 mmol/L    Anion Gap 11 3 - 14 mmol/L    Glucose 108 (H) 70 - 99 mg/dL    Urea Nitrogen 24 7 - 30 mg/dL    Creatinine 4.01 (H) 0.66 - 1.25 mg/dL    GFR Estimate 15 (L) >60 mL/min/1.7m2    GFR Estimate If Black 18 (L) >60 mL/min/1.7m2    Calcium 7.4 (L) 8.5 - 10.1 mg/dL   Magnesium   Result Value Ref Range    Magnesium 1.4 (L) 1.6 - 2.3 mg/dL   Phosphorus   Result Value Ref Range    Phosphorus 2.6 2.5 - 4.5 mg/dL   PTT (AM Draw)   Result Value Ref Range    PTT 37 22 - 37 sec   INR   Result Value Ref Range    INR 1.31 (H) 0.86 - 1.14   Fibrinogen activity (AM Draw)   Result Value Ref Range    Fibrinogen 407 200 - 420 mg/dL   CT Abdomen Pelvis w Contrast    Narrative    Multiple inflamed loops of bowel:  1 a. Inflamed segment of distal sigmoid colon in the setting of  diverticulosis, most likely diverticulitis. This is complicated by a  pericolonic abscess measuring up to 7.8 cm, which abuts multiple  loops  of small bowel.   1 b. Inflamed loop of proximal sigmoid colon, most compatible with  uncomplicated diverticulitis.  1 c. inflammation of the right colon compatible with colitis,  typhlitis is a consideration setting of neutropenia.  1 e. Unchanged inflammation of the proximal duodenum, with a chronic  appearance. Peptic ulcer disease is a consideration.  2. Small perirectal abscess, measuring up to 2.7 cm.  3. New right lower lobe pulmonary nodules measuring 5 and 3 mm,  respectively. Recommend short-term follow-up CT chest in 3 months.  4. Stable size of multiple stable pancreatic IPMNs.   Glucose by meter   Result Value Ref Range    Glucose 80 70 - 99 mg/dL     *Note: Due to a large number of results and/or encounters for the requested time period, some results have not been displayed. A complete set of results can be found in Results Review.[HJ1.3]       Admitting Diagnosis:   1. Heart transplant recipient (H)    2. Fever, unspecified fever cause    3. Chills    4. Hypoglycemia        Assessment & Plan:  63 year old male[HJ1.1] h/o heart TXP 6/2018 on immunosuppressants, ESRD on HD, R IJ thrombus on apixabam (held[HJ1.2], last dose 2d ago[HJ1.4]), T2DM, ongoing pseudomonal bacteremia p/w abd pain & BRBPR w/ pericolonic abscess seen on CT[HJ1.2].[HJ1.1] Given pt's immunosuppressed status, unable to rely on the usual markers of infxn (WBC, temp, exam) and given size of abscess, would recommend invasive intervention rather than conservative mgmt.  - Recommend switching abx to Zosyn  - Recommend IR consult for drainage  - NPO & hold any anticoags in case of procedure  - Colorectal will continue to follow[HJ1.2]      D/w fellow    Fatimah Sorto MD/MPH  Plastic Surgery PGY2[HJ1.1]     Revision History        User Key Date/Time User Provider Type Action    > HJ1.4 8/12/2018  1:39 PM Fatimah Sorto MD Resident Sign     [N/A] 8/12/2018 12:11 PM Fatimah Sorto MD Resident Sign     HJ1.2 8/12/2018 11:59 AM Noreen  Fatimah RESENDIZ MD Resident Sign     HJ1.3 8/12/2018 10:56 AM Fatimah Sorto MD Resident      HJ1.1 8/12/2018 10:55 AM Fatimah Sorto MD Resident             Consults by Patricia Cook NP at 8/12/2018  1:21 PM     Author:  Patricia Cook NP Service:  Nephrology Author Type:  Nurse Practitioner    Filed:  8/12/2018  4:48 PM Date of Service:  8/12/2018  1:21 PM Creation Time:  8/12/2018  1:20 PM    Status:  Signed :  Patricia Cook NP (Nurse Practitioner)     Consult Orders:    1. Nephrology ESRD Adult IP Consult: Routine dialysis TThSa; Consultant may enter orders: Yes [[id number]] ordered by Juan Mckay MD at 08/12/18 1150                  Nephrology Initial Consult  August 12, 2018      Murray Nicholson MRN:[id number] YOB: 1955  Date of Admission:8/12/2018  Primary care provider: Yahir Turcios  Requesting physician: Arcelia Blum, *    ASSESSMENT AND RECOMMENDATIONS:[DL1.1]     1. ESRD - Etiology of ESRD 2/2 HTN, DM, CRS.   Has chronic OP HD at [facility], TTS schedule, under care of Dr Mcpherson.    - Dialysis orders: Right TDC, EDW 76 kg, Run time 4 hrs   - Will continue TTS schedule while inpatient    2. Volume status - Euvolemic. No edema/dyspnea. SBP teens - 150/. No urine recorded. Weight 75 kg   - EDW 76 kg   - Continue pre run weights   - I/O    3. HTN - Variable control. Has pain. SBP teens - 150/. OP regimen:  Clonidine 0.1 mg HS, Hydralazine 50 mg QID, Lisinopril 20 mg every day   - All currently on hold    4. Transplant IS regimen: Tac, MMF, Pred     5. Electrolytes - No acute concerns. K 3.5, Na 138    6. Acid base - No acute concerns. Bicarb 25    7. BMD - Ca 7.4, Mg 1.4, Phos 2.6   - Vit D/PTH being managed in OP HD unit    8. Anemia - Hgb 7.4. On Epo 7600 q run   - Will confirm with HD unit tomorrow    9. Abdominal pain/bloody stool - Abd Ct today showing pericolonic abscess 7.8 cm, small perircal abscess 2.7 cm. Colorectal surgery has  consulted w/recommendations for Zosyn, IR for drainage, hold anticoagulants and NPO.    - ID has consulted and recommending Cefepime and Flagyl. Checking stools cxs, colonoscopy   - WBC 4.9, afebrile    10. Lung nodules - New Right lower lobe pulmonary nodules seen on CT today. ID recommending close f/u.[DL1.2]     Recommendations were communicated to primary team via[DL1.1] progress note[DL1.2]    Patricia Cook, NP[DL1.3]   899-[DL1.1]6002[DL1.2]    REASON FOR CONSULT:[DL1.1] ESRD Management[DL1.2]    HISTORY OF PRESENT ILLNESS:  Murray Nicholson is a 63 year old[DL1.1] male with Heart transplant 6/18, ESRD on HD, HTN, Right internal jugular thrombus on Aphixaban, recent admission 7/26-8/6/18  for pseudomonas bacteremia felt to be 2/2 probable prececal colitis and necrotic mouth ulcer, admitted 8/12/18 with abdominal pain, bloody stool and fever.   Last HD 8/11/18.[DL1.2]     PAST MEDICAL HISTORY:  Reviewed with patient on[DL1.1] 08/12/2018[DL1.3]     Heart Transplant 6/13/18  ESRD on HD  Right internal jugular thrombus on Apixaban  T2DM  Recent Pseudomonas bacteremia  HTN  HLD  GERD  Anemia of chronic disease  MGUS  SHEELA[DL1.2]    Past Surgical History:   Procedure Laterality Date     COLONOSCOPY N/A 5/3/2018    Procedure: COLONOSCOPY;  colonoscopy ;  Surgeon: Ammon Castillo MD;  [facility]     ESOPHAGOSCOPY, GASTROSCOPY, DUODENOSCOPY (EGD), COMBINED N/A 5/7/2018    Procedure: COMBINED ENDOSCOPIC ULTRASOUND, ESOPHAGOSCOPY, GASTROSCOPY, DUODENOSCOPY (EGD), FINE NEEDLE ASPIRATE/BIOPSY;  Endoscopic Ultrasound with Fine Needle Aspiration ;  Surgeon: Alon Don MD;  [facility]     LAPAROSCOPIC HERNIORRHAPHY INGUINAL BILATERAL Bilateral 7/24/2015    Procedure: LAPAROSCOPIC HERNIORRHAPHY INGUINAL BILATERAL;  Surgeon: Bobby Mcconnell MD;  [facility]     LAPAROSCOPIC INSERTION CATHETER PERITONEAL DIALYSIS N/A 6/22/2017    Procedure: LAPAROSCOPIC INSERTION CATHETER PERITONEAL DIALYSIS;   Laparoscopic Peritoneal Dialysis Catheter Placement - Anesthesia with block;  Surgeon: Esteban Arvizu MD;  [facility]     PICC INSERTION Left 04/22/2018    5Fr - 49cm (3cm external), Basilic vein, low SVC     REMOVE CATHETER PERITONEAL Right 1/15/2018    Procedure: REMOVE CATHETER PERITONEAL;  Open Removal of Peritoneal Dialysis Catheter ;  Surgeon: Esteban Arvizu MD;  [facility]     TRANSPLANT HEART RECIPIENT N/A 6/14/2018    Procedure: TRANSPLANT HEART RECIPIENT;  Median Sternotomy, on-pump oxygenator, Heart Transplant;  Surgeon: Rony Caputo MD;  Location[facility][DL1.3]        MEDICATIONS:  PTA Meds  Prior to Admission medications    Medication Sig Last Dose Taking? Auth Provider   acetaminophen (TYLENOL) 325 MG tablet Take 2 tablets (650 mg) by mouth every 4 hours as needed for mild pain (multimodal surgical pain management along with NSAIDS and opioid medication as indicated based on pain control and physical function.)   Mary Alice Andre APRN CNP   albuterol (PROAIR HFA/PROVENTIL HFA/VENTOLIN HFA) 108 (90 Base) MCG/ACT Inhaler Inhale 6 puffs into the lungs every 4 hours as needed for shortness of breath / dyspnea   Mary Alice Andre APRN CNP   apixaban ANTICOAGULANT (ELIQUIS) 2.5 MG tablet Take 1 tablet (2.5 mg) by mouth 2 times daily   Mary Alice Andre APRN CNP   ASPIRIN 81 MG OR TABS Take 1 tablet (81 mg) by mouth at bedtime   Reported, Patient   biotin (BIOTIN 5000) 5 MG CAPS Take 5 mg by mouth daily   Jennifer Reid, PADariuszC   blood glucose monitoring (ACCU-CHEK FASTCLIX) lancets Use to test blood sugar 2-3 times daily or as directed.  Ok to substitute alternative if insurance prefers.   Yahir Turcios MD   blood glucose monitoring (NO BRAND SPECIFIED) test strip Use to test blood sugar 2-3 times daily or as directed.   Mellisa Suh APRN CNP   cloNIDine (CATAPRES) 0.1 MG tablet Take 1 tablet (0.1 mg) by mouth At Bedtime   Jennifer Dean APRN CNP   fluticasone (FLONASE) 50  MCG/ACT spray Spray 1-2 sprays into both nostrils daily   Klever Ernst MD   hydrALAZINE (APRESOLINE) 25 MG tablet Take 2 tablets (50 mg) by mouth 4 times daily Hold if top number BP less than 110.  Patient taking differently: Take 75 mg by mouth 3 times daily Hold if top number BP less than 110.   Jennifer Dean APRN CNP   insulin isophane human (HUMULIN N PEN) 100 UNIT/ML injection 30 units in AM and 16 units in the evening. Please check blood sugar four times daily.   Jennifer Dean APRN CNP   lisinopril (PRINIVIL/ZESTRIL) 20 MG tablet Take 1 tablet (20 mg) by mouth daily   Jennifer Dean APRN CNP   loratadine (CLARITIN) 10 MG tablet Take 10 mg by mouth daily as needed Reported on 5/3/2017   Reported, Patient   melatonin 1 MG TABS tablet Take 2 tablets (2 mg) by mouth nightly as needed for sleep   Mellisa Suh APRN CNP   mycophenolate (GENERIC EQUIVALENT) 250 MG capsule Take 2 capsules (500 mg) by mouth 2 times daily   Jennifer Dean APRN CNP   NEPHROCAPS 1 MG capsule Take 1 capsule by mouth daily   Mary Alice Andre APRN CNP   nystatin (MYCOSTATIN) 303014 UNIT/ML suspension Swish and swallow 10 mLs (1,000,000 Units) in mouth 4 times daily   Mary Alice Andre APRN CNP   order for DME Equipment being ordered: [employer] ()  Treatment Diagnosis: ESRD on PD  Pt has to be connected to PD all night and can not be disconnected, hence impending his mobility to go to the bathroom. At risk for infection if he does not have this equipment.  Patient not taking: Reported on 7/26/2018   Breanne Miller MD   pantoprazole (PROTONIX) 40 MG EC tablet Take 1 tablet (40 mg) by mouth every morning   Klever Ernst MD   pravastatin (PRAVACHOL) 40 MG tablet Take 1 tablet (40 mg) by mouth daily DC after current prescription completed; replace with rosuvastatin.   Klever Ernst MD   predniSONE (DELTASONE) 5 MG tablet Take 3 tablets (15 mg) by mouth 2 times daily   Jennifer Dean VERONICA CNP    rosuvastatin (CRESTOR) 20 MG tablet Take 1 tablet (20 mg) by mouth daily   Klever Ernst MD   tacrolimus (GENERIC EQUIVALENT) 1 MG capsule 1 mg in AM and 2 mg at supper. Repeat level Wednesday.   Jennifer Dean APRN CNP   traMADol (ULTRAM) 50 MG tablet Take 1 tablet (50 mg) by mouth every 6 hours as needed for moderate pain   Mary Alice Andre APRN CNP   triamcinolone (KENALOG) 0.1 % ointment Apply topically 2 times daily   Jennifer Reid, PADariuszC      Current Meds[DL1.1]    biotin  5 mg Oral Daily     [START ON 8/13/2018] ceFEPIme (MAXIPIME) IV  1 g Intravenous Q24H     fluticasone  1-2 spray Both Nostrils Daily     heparin (porcine)  3,000 Units Intravenous Once     heparin (porcine)  3,000 Units Intravenous Once     insulin aspart  1-6 Units Subcutaneous Q4H     metroNIDAZOLE  500 mg Intravenous Q8H     mycophenolate  500 mg Oral BID IS     NEPHROCAPS  1 capsule Oral Daily     nystatin  1,000,000 Units Swish & Swallow 4x Daily     pantoprazole  40 mg Oral QAM     predniSONE  15 mg Oral BID     rosuvastatin  20 mg Oral Daily     sodium chloride (PF)  3 mL Intracatheter Q8H     sodium chloride (PF)  3 mL Intracatheter Q8H     tacrolimus  1 mg Oral QAM     tacrolimus  2 mg Oral QPM     triamcinolone   Topical BID[DL1.3]     Infusion Meds[DL1.1]    - MEDICATION INSTRUCTIONS -[DL1.3]         ALLERGIES:[DL1.1]    Allergies   Allergen Reactions     Norco [Hydrocodone-Acetaminophen] Nausea and Vomiting     Cats      Throat tightness     Isosorbide Other (See Comments)     hypotension     Penicillins Hives     Seasonal Allergies      rhinitis     Shrimp      Throat closes[DL1.3]        REVIEW OF SYSTEMS:[DL1.1]    Patient notes that he developed mid abdominal pain yesterday following HD accompanied by bloody stools. Had been having frequent brown colored stools up until yesterday. Denies vomiting or hematemesis. Had fever. Had recently completed course of Cefepime for treatment of Pseudomonas bacteremia. Had  "been receiving Cefepime during OP HD treatments. Has HD at [facility]. Last run 18. Denies CP, dyspnea. No LE edema.[DL1.2]     SOCIAL HISTORY:[DL1.1]   Single, NS[DL1.2]    FAMILY MEDICAL HISTORY:[DL1.1]   Family History   Problem Relation Age of Onset     C.A.D. Father       from-never knew father-age 60     Diabetes Father      Cerebrovascular Disease Father      Hypertension No family hx of      Breast Cancer No family hx of      Cancer - colorectal No family hx of      Prostate Cancer No family hx of      KIDNEY DISEASE No family hx of      Melanoma No family hx of      Skin Cancer No family hx of[DL1.3]      PHYSICAL EXAM:   Temp  Av.2  F (36.8  C)  Min: 97.1  F (36.2  C)  Max: 100.8  F (38.2  C)      Pulse  Av  Min: 64  Max: 109 Resp  Av.6  Min: 9  Max: 21  SpO2  Av.1 %  Min: 72 %  Max: 100 %[DL1.1]       BP (!) 158/97  Pulse 99  Temp 98.3  F (36.8  C) (Oral)  Resp 16  Ht 1.753 m (5' 9\")  Wt 75 kg (165 lb 5.5 oz)  SpO2 100%  BMI 24.42 kg/m2[DL1.3]       Admit Weight: 75 kg (165 lb 5.5 oz)     GENERAL APPEARANCE:[DL1.1] fatigued[DL1.2]  EYES:[DL1.1] no[DL1.2] scleral icterus, pupils equal  Pulmonary: lungs[DL1.1] CTA. Breathing is non labored.[DL1.2]   CV:[DL1.1] tachy[DL1.2]   - Edema[DL1.1] - none[DL1.2]  GI: soft,[DL1.1] tender in the mid LQ[DL1.2]  MS: no evidence of inflammation in joints, no muscle tenderness  :[DL1.1] no[DL1.2] doyle  SKIN: no rash, warm, dry, no cyanosis  NEURO:[DL1.1] alert/oriented  ACCESS: Right TDC[DL1.2]    LABS:   CMP[DL1.1]  Recent Labs  Lab 18  0834 18  0230 08/10/18  1104 18  0921 18  0644    137 135 132* 135   POTASSIUM 3.5 3.5 4.8 5.1 4.8   CHLORIDE 101 103 102 99 100   CO2 25 26 24 22 28   ANIONGAP 11 8 9 10 7   * 38* 220* 134* 50*   BUN 24 22 32* 38* 53*   CR 4.01* 3.60* 4.87* 5.01* 6.47*   GFRESTIMATED 15* 17* 12* 12* 9*   GFRESTBLACK 18* 21* 15* 14* 11*   TRINI 7.4* 7.6* 7.7* 8.3* 8.7 "   MAG 1.4*  --  1.6 1.7 1.6   PHOS 2.6  --  2.8 3.1  --    BILITOTAL  --   --   --  0.4  --    ALKPHOS  --   --   --  156*  --    AST  --   --   --  34  --    ALT  --   --   --  22  --[DL1.3]      CBC[DL1.1]  Recent Labs  Lab 08/12/18  1253 08/12/18  1010 08/12/18  0834 08/12/18  0230 08/10/18  1104   HGB 7.5* 7.0* 7.4* 8.2* 8.0*   WBC 4.8  --  4.9 4.7 3.6*   RBC 2.52*  --  2.52* 2.78* 2.69*   HCT 23.7*  --  23.2* 25.8* 25.2*   MCV 94  --  92 93 94   MCH 29.8  --  29.4 29.5 29.7   MCHC 31.6  --  31.9 31.8 31.7   RDW 22.2*  --  22.0* 22.0* 21.6*   *  --  131* 136* 174[DL1.3]     INR[DL1.1]  Recent Labs  Lab 08/12/18  0834 08/12/18  0230   INR 1.31* 1.25*   PTT 37  --[DL1.3]      ABG[DL1.1]No lab results found in last 7 days.[DL1.3]   URINE STUDIES[DL1.1]  Recent Labs   Lab Test  07/28/18   0528  07/18/18   0855  06/25/18   1420  02/05/18   0935   COLOR  Yellow  Yellow  Yellow  Yellow   APPEARANCE  Slightly Cloudy  Cloudy  Slightly Cloudy  Slightly Cloudy   URINEGLC  300*  50*  300*  Negative   URINEBILI  Negative  Negative  Negative  Small*   URINEKETONE  Negative  5*  Negative  Negative   SG  1.017  1.016  1.013  1.016   UBLD  Trace*  Small*  Negative  Small*   URINEPH  5.5  6.0  5.0  5.5   PROTEIN  30*  100*  30*  100*   NITRITE  Negative  Negative  Negative  Negative   LEUKEST  Negative  Trace*  Small*  Negative   RBCU  3*  4*  3*  1   WBCU  5  12*  9*  4*     Recent Labs   Lab Test  05/17/17   1225  04/19/17   1442  05/19/15   1006  02/12/14   1205  08/14/13   1341  07/08/13   1347  07/03/13   1410   UTPG  0.67*  0.93*  1.77*  2.25*  1.25*  1.04*  1.14*[DL1.3]     PTH[DL1.1]  Recent Labs   Lab Test  04/09/17   1019  02/10/16   1357  07/03/13   1359  08/24/11   0903  02/23/11   0953   PTHI  110*  247*  91*  59  91*[DL1.3]     IRON STUDIES[DL1.1]  Recent Labs   Lab Test  07/10/18   0711  05/08/18   0954  07/19/17   1306  07/05/17   1204  06/21/17   1058  05/17/17   1214  04/19/17   1447  04/11/17   0722   03/15/17   1355  02/15/17   1023  01/04/17   1005  12/06/16   1126  11/18/16   0920  10/21/16   0952  09/14/16   1319  08/11/16   0904  06/02/16   0950  05/12/16   1154  04/05/16   1224  02/10/16   1357  12/02/15   1412  11/17/15   1012  09/01/15   1059  07/16/15   0829  06/16/15   1656  05/19/15   1000  05/18/15   1140  04/09/15   0900  01/07/14   1032  09/18/13   1024  08/14/13   1340  07/08/13   1336  07/03/13   1359  02/28/13   0952  02/23/11   0953   IRON  66  58  46  26*  69  42  63  17*  30*  28*  43  34*  34*  77  24*  77  74  53  62  61  109  66  40  57  55  30*  35   --    --    --   23*  <10*  32*  22*  68   FEB  238*  218*  263  228*  237*  210*  213*  176*  232*  215*  243  254  236*  234*  215*  264  233*  242  278  284  280  265  333  331  372  392  380   --    --    --   423  423  443*  425  362   IRONSAT  28  27  18  12*  29  20  30  10*  13*  13*  18  13*  14*  33  11*  29  32  22  22  21  39  25  12*  17  15  8*  9*   --    --    --   5*  <2*  7*  5*  19   ALBERTINA  771*  621*  369  542*  557*  806*  1509*  1194*  860*  432*  663*  460*  505*  420*  359  297  385  536*  620*  261  424*  504*  30  25*  22*  19*   --   19*  38  30  9*  7*  8*  13*   --[DL1.3]        IMAGING:  EXAMINATION: CT ABDOMEN PELVIS W CONTRAST, 8/12/2018 9:03 AM     TECHNIQUE:  Helical CT images from the lung bases through the  symphysis pubis were obtained with IV contrast. Contrast dose: 101 cc  of isovue 370     COMPARISON: CT cap 7/26/2018, 6/28/2018, CT abdomen pelvis 6/14/2018     HISTORY: 64 yo s/p heart transplant, recent neutropenia w/colitis on  CT, now w/worsening LUQ/LLQ abdominal pain and bright red blood per  rectum;      FINDINGS:     Abdomen and pelvis: The liver, spleen, and adrenal glands are normal.  Multiple hypoattenuating lesions in the pancreas, most prominently  there is a 12 x 9 mm lesion in the uncinate process (series 3 image  178) which previously measured 13 x 7 mm, and in the proximal body  measuring  18 x 13 mm (series 3 image 101) previously 19 x 13 mm.     Atrophic kidneys. No hydronephrosis or renal calculus. Right midpole  simple renal cyst and additional bilateral too small to characterize  renal cortical hypoattenuating lesions in similar in size infiltration  the prior CT. There are bladder is decompressed. Prostate is  heterogeneous, similar to the prior CT.     Adjacent to the right distal rectum there is a 2.2 x 1.7 x 2.7 cm  fluid collection with a air-fluid level (series 3 image 4 and 38).  Thickening and inflammatory changes of the left colon, similar to the  prior CT. Mild periappendiceal stranding at the base of the appendix,  which is more normal distally, and likely reactive to the parameter  process of the ascending colon/cecum. Inflammatory changes of the at  least 2 segments of the sigmoid colon (in the left upper quadrant  (series 3 image 213) where there is mesenteric fat stranding and bowel  wall thickening, and distal sigmoid colon (series 3 images 332-275,  coronal series 4 image 36). Adjacent to the inflamed distal sigmoid  colon there is a air and fluid filled collection which extends into  the central abdomen measuring 5.6 x 4.3 x 7.8 cm and abuts loops of  small bowel.     Thick and bowel wall of the first and second portion of the duodenum  with fat attenuation in the wall, similar in appearance to the prior  CT. Small sliding type hernia.     Small amount of free fluid in the pelvis. No free air. No enlarged  abdominal lymph nodes. The portal venous system is patent. Abdominal  aorta is normal caliber.     Lung bases: Trace pericardial effusion. Partially visualized  sternotomy wires. No pleural effusion. New 5 mm pulmonary nodule in  the right lower lobe (series 3 image 39) and 3 mm pulmonary nodule in  the posterior right lower lobe (series 3 image 13).     Bones and soft tissues: Degenerative changes of the hips, including a  large geodes of the hips. No aggressive osseous  lesion.         IMPRESSION:   Multiple inflamed loops of bowel:  1 a. Inflamed segment of distal sigmoid colon in the setting of  diverticulosis, most likely diverticulitis. This is complicated by a  pericolonic abscess measuring up to 7.8 cm, which abuts multiple loops  of small bowel.   1 b. Inflamed loop of proximal sigmoid colon, most compatible with  uncomplicated diverticulitis.  1 c. Inflammation of the right colon compatible with colitis,  typhlitis is a consideration setting of neutropenia.  1 e. Unchanged inflammation of the proximal duodenum, with a chronic  appearance. Peptic ulcer disease is a consideration.  2. Small perirectal abscess, measuring up to 2.7 cm.  3. New right lower lobe pulmonary nodules measuring 5 and 3 mm,  respectively. Recommend short-term follow-up CT chest in 3 months.  4. Stable size of multiple stable pancreatic IPMNs.     [Result: Abscess, colitis]     Finding was identified on 8/12/2018 9:33 AM.      Dr. Bearden was contacted by Dr. Gloria at 8/12/2018 10:39 AM and  verbalized understanding of the urgent finding.      I have personally reviewed the examination and initial interpretation  and I agree with the findings.     JOVANNA PÉREZ MD    EXAMINATION: DOPPLER VENOUS ULTRASOUND OF THE RIGHT UPPER EXTREMITY,  8/12/2018 8:20 AM      COMPARISON: Venous duplex ultrasound 6/22/2018     HISTORY: Evaluate right IJ thrombus     TECHNIQUE:  Gray-scale evaluation with compression, spectral flow and  color Doppler assessment of the deep venous system of the right upper  extremity.     FINDINGS:  Right: The low right IJ is partially compressible. The high right IJ  is fully compressible and patent. Normal blood flow and waveforms are  demonstrated in the innominate, subclavian, and axillary veins.          IMPRESSION:  1.  Unchanged nonocclusive DVT in the low right internal jugular vein.     I have personally reviewed the examination and initial interpretation  and I agree with the  findings.     JOVANNA PÉREZ MD[DL1.1]    Patricia Cook NP[DL1.3]       Revision History        User Key Date/Time User Provider Type Action    > DL1.2 8/12/2018  4:48 PM Patricia Cook NP Nurse Practitioner Sign     DL1.3 8/12/2018  1:21 PM Patricia Cook NP Nurse Practitioner      DL1.1 8/12/2018  1:20 PM Patricia Cook NP Nurse Practitioner             Consults by Jluiana Alexander MD at 8/12/2018 12:38 PM     Author:  Juliana Alexander MD Service:  Infectious Disease Author Type:  Fellow    Filed:  8/12/2018  2:10 PM Date of Service:  8/12/2018 12:38 PM Creation Time:  8/12/2018 12:38 PM    Status:  Attested :  Juliana Alexander MD (Fellow)    Cosigner:  Vinayak Lawrence MD at 8/12/2018  2:34 PM         Consult Orders:    1. Infectious Disease Transplant SOT Adult IP Consult: Patient to be seen: Routine within 24 hrs; Call back #: [id number]; ongoing pseudomonal bacteremia refractory to cefepime, eval for abx mgmt, double coverage vs alt agent? [[id number]] ordered by Naresh Phelan MD at 08/12/18 0528           Attestation signed by Vinayak Lawrence MD at 8/12/2018  2:34 PM        Transplant Infectious Disease Consult Attending Attestation:   Mr. Nicholson  was seen and evaluated by meVinayak MD. The case was discussed at length with the ID Fellow, Dr. Alexander.  I agree with her consultative history and examination, assessment and recommendations in this inpatient Transplant ID Consult note. This note reflects our joint decision-making and I have reviewed today's vital signs, medications, labs and imaging with Dr. Alexander.      Vinayak Lawrence MD  Pager [phone]                                 [facility]  Transplant Infectious Disease Consult Note - New Patient     Patient:  Murray Nicholson, Date of birth 1955, Medical record number [id number]  Date of Visit:[BT1.1]  08/12/2018[BT1.2]  Consult requested by Dr. Phelan  for evaluation of ongoing pseudomonal bacteremia refractory to cefepime, eval for antibiotic management, double coverage versus alternative agent?         Assessment and Recommendations:   Recommendations:  - Continue cefepime and add metronidazole for additional anaerobic coverage of gut tiffany.  We prefer cefepime/metronidazole over pip-tazo due to easier dosing with dialysis and possibility of transition to PO therapy for metronidazole.  - Once patient is off cefepime therapy, we would recommend checking surveillance blood cultures 1 and 2 weeks after cessation of therapy to document clearance of bacteremia.  - Need to obtain documentation of prior positive blood cultures from [facility].  Facility is closed today but left a message with them to call back in the morning.  At this time, our understanding is that he has not had new documented bacteremia since initial positive cultures on 7/26.  - Repeat CMV blood PCR (in process from 8/12).    - Stool studies for enteric pathogens ordered including Microsporidia stain, Clostridium difficile PCR, Cryptosporidium antigen, Giardia antigen, and enteric bacterial LOGAN.  - Agree with plan for GI consult.  We would recommend colonoscopy if safe to do so to look for evidence of CMV colitis.  Patient is high-risk as he is donor seropositive, recipient seronegative and has been off valcyte prophylaxis since 7/20  - If evidence of CMV disease, treatment dosing would be warranted.  Alternatively, if no evidence of CMV disease, would be reasonable to resume prophylactic dosing at this time is neutropenia has resolved.  - Agree with surgery and IR consults to evaluate for need for drainage of abscess.  If material is obtained for cultures would send for gram stain, aerobic and anaerobic culture and fungal cultures.  D/w cards2 and currently on scheduled with IR for tomorrow.  - For PJP prophylaxis, we recommend resuming bactrim regimen 1 SS tab three times weekly (HD dosing) since  leukopenia is resolved.  - For small pulmonary nodules, we would recommend sooner interval imaging in transplant patient with repeat CT in 4-6 weeks.    Assessment:  Mr. Nicholson is a 63-year-old man with history of ischemic/valvular cardiomyopathy s/p OHT on 18 (induction with solumedrol and maintenance with tacrolimus, MMF, and prednisone), ESRD on TThSa hemodialysis currently listed for kidney transplantation, history of polymicrobial peritonitis with Candida glabrata when previously on peritoneal dialysis in 2018, recent Pseudomonas aeruginosa bacteremia on 18 with retained dialysis catheter, hypertension, hyperlipidemia, and type 2 diabetes who was admitted on  with subjective fevers, severe lower abdominal pain, and bloody stools.    ID issues include the followin. Colitis with 7.8 cm daniella-colonic and smaller 2.7 cm perirectal abscesses: During his prior admission, he had imaging findings suggestive of early colitis and more recent imaging from  suggesting progression of this now with development of abscesses.  Throughout this time period, he had been on broad-spectrum coverage with cefepime which should suppress many enteric organisms though with holes in anaerobic coverage that could explain aggression of symptoms.  Given his past growth of Candida glabrata from intra-abdominal fluid, would also need to be concerned about Candida.  Finally, he is at high risk of CMV disease based on his donor positive, recipient negative status and the fact that he has been off prophylactic therapy with Valcyte  while he is still fairly early after transplant.  As of , he had not developed CMV viremia but it is possible to have CMV colitis in the absence of significant viremia.  With respect to infectious causes of colitis, would expect many the common bacterial pathogens such as Salmonella and Shigella that can present with bloody stools to be treated by the cefepime he has been receiving.   Grossly bloody stools are not typically visit presenting symptoms of parasitic infection such as Giardia or Cryptosporidium.    2.  Pseudomonas aeruginosa bacteremia associated with a hemodialysis catheter that was retained: History of positive blood cultures from 7/26 but by report, there have not been more recent positive cultures since then.  There is certainly a risk that he may have recrudescence of bacteremia as result of biofilm formation on the line that was retained.  However, at present, the cefepime would be suppressing growth as he has not been off antibiotics for any period of time.  He has not had evidence of breakthrough bacteremia as assessed by blood cultures from 8/10 as well as more recent blood cultures drawn during this admission.  Because of his intra-abdominal abscesses, we expect that he will continue to be on an agent active against Pseudomonas for some time.  Due to risk of colonization of the line, we would recommend surveillance blood cultures in the future with timing to be determined based on his final duration of antibiotic therapy but anticipated to be 1 and 2 weeks after cessation of any antibiotics active against Pseudomonas.    3. New small (3mm and 5mm) pulmonary nodules: Would repeat CT chest in 4-6 weeks.  4. Oral ulcer: Viral testing during last admission was negative.  Wound culture grew Pseudomonas in addition to oral tiffany.  Have not seen previously but improving by patient report.    Other:  - QTc interval: 451ms 7/26/18  - Viral serostatus & prophylaxis: HSV1-, HSV2+, CMV D+/R-, EBV D?/R+, VZV+  - Isolation status: good hand hygiene    Staffed with Dr. Lawrence.  Patient will continue to be followed by Dr. Lawrence on the HSCT transplant service.    Recommendations discussed with primary cards2 team.    Juliana Alexander MD, PhD  Adult & Pediatric Infectious Diseases Fellow PGY7, [facility]  Pager: [phone]        History of Infectious Disease Illness:    is a  63-year-old man with history of ischemic/valvular cardiomyopathy s/p OHT on 6/14/18 (induction with solumedrol and maintenance with tacrolimus, MMF, and prednisone), ESRD on TThSa hemodialysis currently listed for kidney transplantation, history of polymicrobial peritonitis with Candida glabrata when previously on peritoneal dialysis in 1/2018, hypertension, hyperlipidemia, and type 2 diabetes who was admitted on 8/12 with subjective fevers, severe lower abdominal pain, and bloody stools.    His pertinent history is notable for an admission on 4/16/18 for expedited workup for LVAD versus heart and kidney transplantation.  A tunneled right internal jugular line was placed on 4/17/18 and remains in place until this time.  He was started on inotropes during this admission and remained admitted until heart became available.  He ultimately underwent only orthotopic heart transplant on 6/14/18 because the donor had unsuitable kidneys.  His postoperative course was complicated by an incidental pneumoperitoneum and a right IJ thrombus infected with CoNS.  He was discharged on 7/2/18.    He was admitted again from 7/26/18 to 8/6/18 for neutropenic fever, during which time he was followed by the [facility] transplant ID service.  In addition to fevers, associated localizing symptoms included a mouth ulcer but tested negative for HSV but with bacterial cultures positive for Pseudomonas aeruginosa.  Blood PCR's for CMV, EBV, VZV, and parvovirus were all negative.  He had a CT of the chest abdomen and pelvis that showed findings suggestive of colitis but was not having GI symptoms at that time.  Pre-admission blood cultures drawn from his dialysis catheter at [facility] reportedly positive for Pseudomonas aeruginosa from 2 of 2 bottles drawn on 7/26.  This is documented in the notes but have not yet been able to review the micrology reports from [facility].  Decision was made to try to attempt to salvage the line in a 14 day  course of cefepime dosed with dialysis was recommended.  Patient reports that 8/9 was scheduled to be his last dose.  However when he was seen in clinic on 8/10, he reported chills and thus the cefepime was extended with an additional dose given on 8/11 at dialysis yesterday.    After dialysis yesterday, patient reported some low-grade fevers up to 100s.  He also had acute onset of severe bilateral lower abdominal pain.  The pain has since decreased in intensity and is currently only a mild ache.  He typically has 5-6 stools per day but after dialysis yesterday, stools were grossly bloody on several occasions intermixed with more normal-appearing stools.  He took some Tylenol and some Pepto-Bismol and attempted to wait out the pain at home.  He felt incredibly fatigued and reports it took him approximately 3 hours just to put on a pair of pants.  He ultimately felt so unwell that he called his transplant coordinator who recommended calling EMS.  He considered having a friend bring him to the hospital but felt weak enough that he was afraid he might fall so ultimately was brought in by ambulance.  On review of systems he denies any headaches, visual symptoms, sore throat, rhinorrhea, cough, shortness of breath, muscle or joint symptoms, or new rashes.  The mouth ulcer he had during his prior admission seems to be healing and is not painful.  He makes very small quantities of urine and this is not changed.  He is currently NPO since arriving in the hospital.    We reviewed his allergy history, particularly documented history of penicillin allergy.  By his report this was related to a rash in childhood.  He does not recall if he is ever received penicillin antibiotics more recently.  I reviewed his [facility] medical history and cannot find any documentation that he has received penicillins in the past.    Social history and risk factors reviewed.  He was born in [facility] and previously lived in [place] but has been  in [place] for many years.  He currently lives alone in [place].  Does not have any pets or significant animal exposures.  He has been sometime in a resort in [place] in the distant past but other significant travel history outside the [place].  No known risk factors for tuberculosis exposure.    Transplants:  6/14/2018 (Heart), Postoperative day:[BT1.1]  59[BT1.2]     Immunosuppression: Mycophenolate 500 mg twice daily, tacrolimus 1 mg every morning and 2 mg every afternoon prior trough goal had been 10-12 but currently targeting 8.  Prednisone 15 mg twice daily[BT1.1]    Past Medical History:   Diagnosis Date     (HFpEF) heart failure with preserved ejection fraction (H)      Allergic rhinitis, cause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 Blood cultures negative to date.   - Continue Miralax daily as needed for constipation.  - Continue with low residue diet.     - Follow up with colorectal surgery as an outpatient.     RIJ thrombus:  Diagnosed on US on  "8/12/18.  Nonocclusive.  Repeat ultrasound on 9/5/18:  Nonocclusive thrombus to the lower right internal jugular.  -Eliquis remains on hold secondary to recent bleed   -Repeat ultrasound as an outpatient in one month      Chronic/Resolved Medical Conditions:  Pseudomonas bacteremia secondary to necrotic oral ulcer.   Pulmonary Nodules. Stable RLL nodules measuring 5 mm and 3 mm per CT 8/12 and 8/29. Repeat Chest CT due 9/16.  DM Type II. Lantus decreased this AM with low BG. Medium sliding scale insulin.   CDIFF. Completed of 10 day course of oral Vanco 9/6/18.  Acute on Chronic Anemia. Likely acute exacerbation in setting of colitis. Heparin gtt held since 8/22. Received 1 unit PRBC 8/29. Lisinopril held in setting of anemia exacerbation. Per CRS, no scope due to risk of disruption of anastomosis. Peripheral smear with no acute concerns 8/31. Hgb remains stable. Continue Epo per renal. Resume ASA as previously recommended.   Non-Severe Protein Calorie Malnutrition. Albumin 1.9 9/3/18. Oral intake improving.       FEN: Low fiber diet   PROPHY:  Held in setting of bleed. Protonix.   DISPO:  Ok for transfer to TCU when bed ready as WBC count is improving.   CODE STATUS:  Full   ================================================================    Interval History/ROS: Doing well overall.  Complains of dull abdominal pain.  Walked up 13 steps with some shortness of breath. Denies SOB at rest, MEDAE, PND, orthopnea, chest pain, palpitations, lightheadedness, dizziness, near syncopal/syncopal episodes.  Tolerating food and liquids well. Would like to advance diet.       Last 24 hr care team notes reviewed.   ROS:  4 point ROS including Respiratory, CV, GI and , other than that noted in the HPI, is negative.     Medications: Reviewed in EPIC.     Physical Exam:   /83  Pulse 97  Temp 98.3  F (36.8  C) (Oral)  Resp 16  Ht 1.753 m (5' 9\")  Wt 79.9 kg (176 lb 2.4 oz)  SpO2 100%  BMI 26.01 kg/m2  GENERAL: Alert and " oriented times three. Comfortable.  No acute distress.  HEENT: EOM's conjugate. Mucous membranes moist and without lesions.  NECK: Supple and without lymphadenopathy. JVD mid neck.    CV: Rhythm tachycardic. S1S2 present without murmur, rub, or gallop.   RESPIRATORY: Respirations regular, even, and unlabored. No accessory muscle use. Lungs CTA throughout.   GI: Soft and non distended with normoactive bowel sounds present in all quadrants. No tenderness, rebound, guarding. No hepatomegaly.   EXTREMITIES: Extremites warm to the touch.  1+ peripheral edema. 2+ bilateral pedal pulses. No clubbing.  NEUROLOGIC: Alert and orientated x 3. CN II-XII grossly intact. No focal deficits.   MUSCULOSKELETAL: No joint swelling or tenderness. No acute deformities.  SKIN: No cyanosis. No rashes or lesions.[DB1.1] Ecchymotic arms bilaterally.  Skin around ostomy WNL.[DB1.4]     Data:  CMP  Recent Labs  Lab 09/10/18  0640 09/09/18  0636 09/08/18  0612 09/07/18  1819 09/07/18  0534  09/05/18  0642    135 134  --  136  < > 138   POTASSIUM 3.7 3.8 3.8  --  3.9  < > 4.1   CHLORIDE 100 98 98  --  99  < > 106   CO2 29 29 30  --  31  < > 28   ANIONGAP 8 8 7  --  6  < > 5   * 78 110*  --  78  < > 114*   BUN 20 12 20  --  12  < > 16   CR 4.50* 3.35* 4.61*  --  3.49*  < > 4.17*   GFRESTIMATED 13* 19* 13*  --  18*  < > 14*   GFRESTBLACK 16* 23* 16*  --  22*  < > 18*   TRINI 7.6* 7.9* 8.0*  --  8.0*  < > 8.2*   MAG 1.8  --   --  2.6* 1.5*  --  1.7   PHOS 2.4*  --   --   --  1.7*  --  2.2*   PROTTOTAL 5.0*  --   --   --   --   --   --    ALBUMIN 2.0*  --   --   --   --   --   --    BILITOTAL 0.5  --   --   --   --   --   --    ALKPHOS 141  --   --   --   --   --   --    AST 14  --   --   --   --   --   --    ALT 14  --   --   --   --   --   --    < > = values in this interval not displayed.  CBC    Recent Labs  Lab 09/10/18  0640 09/09/18  0636 09/08/18  0612 09/07/18  0534   WBC 1.2* 1.0* 1.0* 1.2*   RBC 2.48* 2.61* 2.65* 2.56*   HGB  7.9* 8.3* 8.3* 8.1*   HCT 25.0* 26.8* 26.3* 25.7*   * 103* 99 100   MCH 31.9 31.8 31.3 31.6   MCHC 31.6 31.0* 31.6 31.5   RDW 21.8* 21.9* 21.5* 21.8*    167 139* 126*     INR    Recent Labs  Lab 09/10/18  0640   INR 1.05       Patient seen and discussed with Dr. Shearer.      Kristi MARTIN CNP  C[facility]  Pager [phone]    9/10/2018[DB1.1]       Revision History        User Key Date/Time User Provider Type Action    > DB1.3 9/10/2018  8:16 PM Kristi Ayon NP Nurse Practitioner Sign     DB1.2 9/10/2018  4:12 PM Kristi Ayon NP Nurse Practitioner      DB1.4 9/10/2018  4:03 PM Kristi Ayon NP Nurse Practitioner      DB1.1 9/10/2018  3:46 PM Kristi Ayon NP Nurse Practitioner             Progress Notes by Jennifer Dean APRN CNP at 9/9/2018  9:43 AM     Author:  Jennifer Dean APRN CNP Service:  Cardiology Author Type:  Nurse Practitioner    Filed:  9/9/2018  1:38 PM Date of Service:  9/9/2018  9:43 AM Creation Time:  9/9/2018  9:43 AM    Status:  Signed :  Jennifer Dean APRN CNP (Nurse Practitioner)           [facility]   Cardiology II Service / Advanced Heart Failure  Daily Progress Note  Date of Service: 9/9/2018      Patient: Murray Nicholson  MRN: [id number]  Admission Date: 8/12/2018  Hospital Day # 28    Assessment and Plan: Murray Nicholson is a 63 year old male with a PMH of NICM s/p OHT 6/14/18, ESRD on HD, RIJ thrombus, and DM Type II. He presented with hematochezia secondary to colonic abscess, rectal abscess, and colitis.       Leukopenia.  Peripheral smear with no acute concerns 8/31. Valcyte discontinued 9/5/18. Cellcept discontinued 9/8/18. Histoplasmosis urine antigen negative.   - WBC-[KS1.1]0.97[KS1.2]. with ANC[KS1.1] 0.6[KS1.2]. Neutropenic precautions. Blood culture NTD.   - Immunknow pending.      S/p OHT. 6/14/18 secondary to NICM. Echo 7/20/18 with normal graft function. RHC 9/5/18 with mRA-4, mPA-20, mPCW-10, BECKA  CO-10, and BECKA CI-5 with biopsy negative.   Serostatus: HSV1-, HSV1+, CMV D+/R-, EBV D?/R+, VZV+.  Immunosuppression: Prednisone 5 mg po BID. Tac level 9.5 with dose decreased to 2 mg po BID 9/7/18. Goal-6-8. Recheck level in AM.   Prophylaxis:Nystatin. Pentamidine last given 8/17 with repeat dose recommended 9/14. Valcyte discontinued, weekly CMV with next due 9/13/18.  - Continue Lipitor.   - ASA 81 mg po daily resumed 9/8/18.      HTN. Lisinopril held 8/6/18 due to concern for anemia exacerbation.   - Hydralazine 75 mg po TID. Norvasc 7.5 mg po daily. Note 2 doses of Hydralazine held yesterday, will monitor BP when gets 24 hours of consistent medications prior to changing dose.       ESRD on HD. Remains inactive on renal transplant list.   - Appreciate Nephrology consult with HD every TTS.       Pericolonic abscess, rectal abscess, and Colitis. S/p laparoscopic sigmoid colectomy with end colostomy 8/14 and drain to rectal abscess 8/12. Biopsy positive for VRE. Completed Cipro and Flagyl course 8/14-8/27. CT enterography 8/30 negative for abscess with improvement in dilated bowel. ID and CRS signed off. Blood cultures negative.   - Miralax daily prn with Low residue diet.   - Follow up with CRS outpatient.       Right internal jugular thrombus. Last noted per US 8/12/18, nonocclusive. Repeat US 9/5/18 notes nonocclusive thrombus to the lower right internal jugular.    - Eliquis remains on hold. Repeat US outpatient in 1 month.       Chronic/Resolved Medical Conditions:  Pseudomonas bacteremia secondary to necrotic oral ulcer.   Pulmonary Nodules. Stable RLL nodules measuring 5 mm and 3 mm per CT 8/12 and 8/29. Repeat Chest CT due 9/16.  DM Type II. Lantus decreased this AM with low BG. Medium sliding scale insulin.   CDIF. Completed of 10 day course of oral Vanco 9/6/18.  Acute on Chronic Anemia. Likely acute exacerbation in setting of colitis. Heparin gtt held since 8/22. Received 1 unit PRBC 8/29. Lisinopril  "held in setting of anemia exacerbation. Per CRS, no scope due to risk of disruption of anastomosis. Peripheral smear with no acute concerns 8/31. Hgb remains stable. Continue Epo per renal. Resume ASA in AM.   Non-Severe Protein Calorie Malnutrition. Albumin 1.9 9/3/18. Oral intake improved.       FEN: Low fiber diet   PROPHY:  Held in setting of bleed. Protonix.   DISPO:  Ok for transfer to TCU. TCU deferred discharge secondary to Leukopenia.   CODE STATUS: Full Code   ================================================================  Interval History/ROS: He is feeling well this AM. He notes mild tremor with low BG this AM, but tolerated breakfast well. He denies fever, chills, chest pain, palpitations, cough, nausea, vomiting, diarrhea, melena, hematochezia, and hematemesis. He is tolerating oral intake and ambulation well with progress with therapy.     Last 24 hr care team notes reviewed.   ROS:  4 point ROS including Respiratory, CV, GI and , other than that noted in the HPI, is negative.     Medications: Reviewed in EPIC.     Physical Exam:   BP (!) 142/98 (BP Location: Left arm)  Pulse 106  Temp 98.6  F (37  C) (Oral)  Resp 16  Ht 1.753 m (5' 9\")  Wt 79.5 kg (175 lb 4.3 oz)  SpO2 100%  BMI 25.88 kg/m2  GENERAL: Appears alert and oriented times three.   HEENT: Eye symmetrical and free of discharge bilaterally. Mucous membranes moist and without lesions.  NECK: Supple and without lymphadenopathy. JVD below clavicular line upright.   CV: RRR, S1S2 present without murmur, rub, or gallop.   RESPIRATORY: Respirations regular, even, and unlabored. Faint crackles to left base, cleared with cough.   GI: Soft and non distended with normoactive bowel sounds present in all quadrants. No tenderness, rebound, guarding. No organomegaly. Colostomy covered.   EXTREMITIES: Trace bilateral peripheral edema. 2+ bilateral pedal pulses.   NEUROLOGIC: Alert and orientated x 3. CN II-XII grossly intact. No focal deficits. "   MUSCULOSKELETAL: No joint swelling or tenderness.   SKIN: No jaundice. No rashes or lesions.     Data:  CMP  Recent Labs  Lab 09/08/18  0612 09/07/18  1819 09/07/18  0534 09/06/18  0705 09/05/18  0642  09/03/18  0616     --  136 140 138  < > 136   POTASSIUM 3.8  --  3.9 4.5 4.1  < > 4.0   CHLORIDE 98  --  99 106 106  < > 100   CO2 30  --  31 28 28  < > 28   ANIONGAP 7  --  6 6 5  < > 8   *  --  78 132* 114*  < > 100*   BUN 20  --  12 24 16  < > 22   CR 4.61*  --  3.49* 5.48* 4.17*  < > 4.67*   GFRESTIMATED 13*  --  18* 11* 14*  < > 13*   GFRESTBLACK 16*  --  22* 13* 18*  < > 15*   TRINI 8.0*  --  8.0* 8.0* 8.2*  < > 7.8*   MAG  --  2.6* 1.5*  --  1.7  --  1.8   PHOS  --   --  1.7*  --  2.2*  --  2.2*   PROTTOTAL  --   --   --   --   --   --  5.2*   ALBUMIN  --   --   --   --   --   --  1.9*   BILITOTAL  --   --   --   --   --   --  0.5   ALKPHOS  --   --   --   --   --   --  138   AST  --   --   --   --   --   --  17   ALT  --   --   --   --   --   --  14   < > = values in this interval not displayed.  CBC  Recent Labs  Lab 09/08/18  0612 09/07/18  0534 09/06/18  1826 09/06/18  0705 09/05/18  0642   WBC 1.0* 1.2*  --  1.2* 1.6*   RBC 2.65* 2.56*  --  2.58* 2.67*   HGB 8.3* 8.1* 8.1* 8.2* 8.5*   HCT 26.3* 25.7*  --  26.3* 27.2*   MCV 99 100  --  102* 102*   MCH 31.3 31.6  --  31.8 31.8   MCHC 31.6 31.5  --  31.2* 31.3*   RDW 21.5* 21.8*  --  22.0* 21.9*   * 126*  --  131* 120*     INR  Recent Labs  Lab 09/03/18  0616   INR 1.05       Patient discussed with Dr. Shearer.     Jennifer Dean [facility]  9/9/2018[KS1.1]         Revision History        User Key Date/Time User Provider Type Action    > KS1.2 9/9/2018  1:38 PM Jennifer Dean APRN CNP Nurse Practitioner Sign     KS1.1 9/9/2018  9:43 AM Jennifer Dean APRN CNP Nurse Practitioner             Progress Notes by Lauren Anderson APRN CNP at 9/9/2018 10:51 AM     Author:  Lauren Anderson APRN CNP Service:  Nephrology Author Type:   Nurse Practitioner    Filed:  9/9/2018 10:53 AM Date of Service:  9/9/2018 10:51 AM Creation Time:  9/9/2018 10:51 AM    Status:  Signed :  Lauren Anderson APRN CNP (Nurse Practitioner)         Nephrology:    Chart and labs reviewed. Patient dialyzed yesterday for 3.5 hours with 0.7 kg of fluid removed. Tolerated well. No acute need for dialysis today. Plan for next HD run on Tuesday, 9/11 per his usual TTS schedule.    VERONICA Cagle CNP  Renal Division[AS1.1]     Revision History        User Key Date/Time User Provider Type Action    > AS1.1 9/9/2018 10:53 AM Lauren Anderson APRN CNP Nurse Practitioner Sign            Progress Notes by Eliud Erazo RN at 9/8/2018  5:25 PM     Author:  Erazo, Mikyoung, RN Service:  Hemodialysis Nurse Author Type:  Registered Nurse    Filed:  9/8/2018  5:43 PM Date of Service:  9/8/2018  5:25 PM Creation Time:  9/8/2018  5:25 PM    Status:  Addendum :  Eliud Erazo RN (Registered Nurse)         HEMODIALYSIS TREATMENT NOTE    Date: 9/8/2018  Time: 5:25 PM    Data:  Pre Wt: 80 kg (176 lb 5.9 oz)   Desired Wt: 79.5 kg   Post Wt: 79.3 kg (174 lb 13.2 oz) Post HD  Weight gain: -0.7 kg off   Weight change: 0.7 kg  Ultrafiltration - Post Run Net Total Removed (mL): 500 mL  Ultrafiltration - Post Run Net Total Gain (mL): 0 mL  Vascular Access Status: Yes, secured and visible  Dialyzer Rinse: Streaked, Light  Total Blood Volume Processed: 72.5 L  Total Dialysis (Treatment) Time:  3.5 hrs    Lab:   NO    Assessment:  Patent CVC (Lt) , Pre run K/Ca: 3.8/ 8.0, P:1.7.    Interventions:  Initiate HD with K 3/3 bath. 4 % Sodium phosphate added to bicarbonate bath. Set BFR at 350 ml/mins. Stable and tolerable for 3.5 hrs run[MH1.1],[MH1.2] Pre meal B/S:123[MH1.1] at 9;54am. Gave lantus but hold for Novolog.[MH1.3] Meal served during HD. Gave morning meds except anti hypertensive meds.[MH1.1] Gave epogen at the end of run. Finished HD with rinse back , CVC  locked with[MH1.3] 1:1000 units heparin each p[MH1.4]ort[MH1.3].[MH1.4] Then gave anti-hypertensive po meds. (See MAR)[MH1.3]     Plan:[MH1.1]    Next run per renal team.[MH1.3]       Revision History        User Key Date/Time User Provider Type Action    > MH1.4 9/8/2018  5:43 PM Eliud Erazo RN Registered Nurse Addend     MH1.2 9/8/2018  5:41 PM Eliud Erazo RN Registered Nurse Addend     MH1.3 9/8/2018  5:39 PM Eliud Erazo RN Registered Nurse Sign     MH1.1 9/8/2018  5:25 PM Eliud Erazo RN Registered Nurse             Progress Notes by Jennifer Dean APRN CNP at 9/8/2018 10:51 AM     Author:  Jennifer Dean APRN CNP Service:  Cardiology Author Type:  Nurse Practitioner    Filed:  9/8/2018  3:00 PM Date of Service:  9/8/2018 10:51 AM Creation Time:  9/8/2018 10:51 AM    Status:  Signed :  Jennifer Dean APRN CNP (Nurse Practitioner)           [facility]   Cardiology II Service / Advanced Heart Failure  Daily Progress Note  Date of Service: 9/8/2018      Patient: Murray Nicholson  MRN: [id number]  Admission Date: 8/12/2018  Hospital Day # 27    Assessment and Plan: Murray Nicholson is a 63 year old male with a PMH of NICM s/p OHT 6/14/18, ESRD on HD, RIJ thrombus, and DM Type II. He presented with hematochezia secondary to colonic abscess, rectal abscess, and colitis.       Leukopenia.  Peripheral smear with no acute concerns 8/31. Valcyte discontinued 9/5/18.   - WBC-1. with ANC 0.[KS1.1]7[KS1.2]. Neutropenic precautions. Blood culture NTD.   - Cellcept d[KS1.1]iscontinued.[KS1.2]   - Histoplasmosis urine antigen[KS1.1] negative[KS1.3].   - Immunknow pending.      S/p OHT. 6/14/18 secondary to NICM. Echo 7/20/18 with normal graft function. RHC 9/5/18 with mRA-4, mPA-20, mPCW-10, BECKA CO-10, and BECKA CI-5 with biopsy negative.   Serostatus: HSV1-, HSV1+, CMV D+/R-, EBV D?/R+, VZV+.  Immunosuppression: Cellcept[KS1.1] discontinued.[KS1.2] Prednisone decreased to 5 mg po  BID. Tac level[KS1.1] 9.5 with dose decreased to 2 mg po BID[KS1.3]. Goal-6-8. [KS1.1]Recheck level Monday AM.[KS1.3]   Prophylaxis:Nystatin. Pentamidine last given 8/17 with repeat dose recommended 9/14. Valcyte discontinued, weekly CMV with next due 9/13/18.  - Continue Lipitor.   -[KS1.1] Resume ASA 81 mg po daily.[KS1.2]       HTN. Lisinopril held 8/6/18 due to concern for anemia exacerbation.   -[KS1.1] Hydralazine decreased to 75 mg po TID[KS1.3]. Norvasc 7.5 mg po daily.       ESRD on HD. Remains inactive on renal transplant list.   - Appreciate Nephrology consult with HD every TTS.[KS1.1] Nephrology to change bath given low P yesterday.[KS1.3]      Pericolonic abscess, rectal abscess, and Colitis. S/p laparoscopic sigmoid colectomy with end colostomy 8/14 and drain to rectal abscess 8/12. Biopsy positive for VRE. Completed Cipro and Flagyl course 8/14-8/27. CT enterography 8/30 negative for abscess with improvement in dilated bowel. ID and CRS signed off. Blood cultures negative.   - Miralax daily prn.   - Low residue diet.   - Follow up with CRS outpatient.      Right internal jugular thrombus. Last noted per US 8/12/18, nonocclusive. Repeat US 9/5/18 notes nonocclusive thrombus to the lower right internal jugular.    - Eliquis remains on hold[KS1.1]. Repeat US outpatient in 1 month.[KS1.2]       Chronic[KS1.1]/Resolved[KS1.3] Medical Conditions:  Pseudomonas bacteremia secondary to necrotic oral ulcer.   Pulmonary Nodules. Stable RLL nodules measuring 5 mm and 3 mm per CT 8/12 and 8/29. Repeat Chest CT due 9/16.  DM Type II. Lantus and Medium sliding scale insulin.   CDIF. Completed of 10 day course of oral Vanco 9/6/18.  Acute on Chronic Anemia. Likely acute exacerbation in setting of colitis. Heparin gtt held since 8/22. Received 1 unit PRBC 8/29. Lisinopril held in setting of anemia exacerbation. Per CRS, no scope due to risk of disruption[KS1.1] of[KS1.3] anastomosis. Peripheral smear with no acute  "concerns 8/31. Hgb[KS1.1] remains stable.[KS1.3] Continue Epo per renal.[KS1.1] Resume ASA in AM.[KS1.2]   Non-Severe Protein Calorie Malnutrition. Albumin 1.9 9/3/18.[KS1.1] Oral intake improved.[KS1.3]       FEN: Low fiber diet   PROPHY:  Held in setting of bleed. Protonix.   DISPO:  Ok for transfer to TCU. TCU deferred discharge secondary to Leukopenia.   CODE STATUS: Full Code   ================================================================  Interval History/ROS:[KS1.1] He continues to feel well today. He denies fever, chills, chest pain, palpitations, MEADE, cough, nausea, vomiting, diarrhea, hematochezia, hematemesis, and melena. He notes mild LE edema, but feels this is improving. He continues to progress with therapy, but continues to struggle with stairs. He is tolerating oral intake well.[KS1.3]      Last 24 hr care team notes reviewed.   ROS:  4 point ROS including Respiratory, CV, GI and , other than that noted in the HPI, is negative.     Medications: Reviewed in EPIC.     Physical Exam:   BP (!) (P) 154/97  Pulse 106  Temp 98.2  F (36.8  C) (Oral)  Resp 18  Ht 1.753 m (5' 9\")  Wt 80 kg (176 lb 5.9 oz)  SpO2 (P) 99%  BMI 26.05 kg/m2  GENERAL: Appears alert and oriented times three.   HEENT: Eye symmetrical and free of discharge bilaterally. Mucous membranes moist and without lesions.  NECK: Supple and without lymphadenopathy. JVD[KS1.1] below clavicular line upright.[KS1.3]   CV:[KS1.1] Regular, tachycardic.[KS1.3] S1S2 present without murmur, rub, or gallop.   RESPIRATORY: Respirations regular, even, and unlabored. Lungs CTA throughout.   GI: Soft and non distended with normoactive bowel sounds present in all quadrants. No tenderness, rebound, guarding. No organomegaly.   EXTREMITIES: No peripheral edema. 2+ bilateral pedal pulses.   NEUROLOGIC: Alert and orientated x 3. CN II-XII grossly intact. No focal deficits.   MUSCULOSKELETAL: No joint swelling or tenderness.   SKIN: No jaundice. No " rashes or lesions.     Data:  CMP  Recent Labs  Lab 09/08/18  0612 09/07/18  1819 09/07/18  0534 09/06/18  0705 09/05/18  0642  09/03/18  0616     --  136 140 138  < > 136   POTASSIUM 3.8  --  3.9 4.5 4.1  < > 4.0   CHLORIDE 98  --  99 106 106  < > 100   CO2 30  --  31 28 28  < > 28   ANIONGAP 7  --  6 6 5  < > 8   *  --  78 132* 114*  < > 100*   BUN 20  --  12 24 16  < > 22   CR 4.61*  --  3.49* 5.48* 4.17*  < > 4.67*   GFRESTIMATED 13*  --  18* 11* 14*  < > 13*   GFRESTBLACK 16*  --  22* 13* 18*  < > 15*   TRINI 8.0*  --  8.0* 8.0* 8.2*  < > 7.8*   MAG  --  2.6* 1.5*  --  1.7  --  1.8   PHOS  --   --  1.7*  --  2.2*  --  2.2*   PROTTOTAL  --   --   --   --   --   --  5.2*   ALBUMIN  --   --   --   --   --   --  1.9*   BILITOTAL  --   --   --   --   --   --  0.5   ALKPHOS  --   --   --   --   --   --  138   AST  --   --   --   --   --   --  17   ALT  --   --   --   --   --   --  14   < > = values in this interval not displayed.  CBC  Recent Labs  Lab 09/08/18  0612 09/07/18  0534 09/06/18  1826 09/06/18  0705 09/05/18  0642   WBC 1.0* 1.2*  --  1.2* 1.6*   RBC 2.65* 2.56*  --  2.58* 2.67*   HGB 8.3* 8.1* 8.1* 8.2* 8.5*   HCT 26.3* 25.7*  --  26.3* 27.2*   MCV 99 100  --  102* 102*   MCH 31.3 31.6  --  31.8 31.8   MCHC 31.6 31.5  --  31.2* 31.3*   RDW 21.5* 21.8*  --  22.0* 21.9*   * 126*  --  131* 120*     INR  Recent Labs  Lab 09/03/18  0616   INR 1.05       Patient discussed with [KS1.1] Manfred.[KS1.3]     Jennifer Dean FNP  9/8/2018[KS1.1]         Revision History        User Key Date/Time User Provider Type Action    > KS1.2 9/8/2018  3:00 PM Jennifer Dean APRN CNP Nurse Practitioner Sign     KS1.3 9/8/2018 11:47 AM Jennifer Dean APRN CNP Nurse Practitioner      KS1.1 9/8/2018 10:51 AM Jennifer Dean APRN CNP Nurse Practitioner             Progress Notes by Lauren Anderson APRN CNP at 9/8/2018 11:22 AM     Author:  Lauren Anderson APRN CNP Service:  Nephrology  Author Type:  Nurse Practitioner    Filed:  9/8/2018 11:25 AM Date of Service:  9/8/2018 11:22 AM Creation Time:  9/8/2018 11:22 AM    Status:  Signed :  Lauren Anderson APRN CNP (Nurse Practitioner)           Nephrology Progress Note  09/08/2018       Murray Nicholson is a 63 year old male with Heart transplant 6/18, ESRD on HD, HTN, Right internal jugular thrombus on Aphixaban admitted 8/12/18 with anorectal abscess and pericolonic abscess status post lap sigmoidectomy with end colostomy and small bowel resection with 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 Right TDC, EDW increased to 79.5 kg   - Will continue TTS schedule   - No heparin   - list for kidney transplant with wait time dating from 3/24/16 - currently inactive as too ill to undergo kidney transplantation surgery, he will not get organ offers but he will continue to accrue wait time      Volume status: EDW 79.5 kg. Though he has edema, his RHC 9/5 with RA 4 and wedge 10    - Continue pre run weights, standing   - Strict I/O  -  EDW 79.5 kg based on RHC from 9/5 (discussed with cardiology, ideally RA of 7; will continue with current EDW for now)     Hypertension: 130-150's  Could consider ACEi though has side effect of anemia and not ideal in this patient with current epo resistance and ongoing requirement of PRBC.    - Per cards, on hydralazine 75 mg qid, amlodipine 5 mg qday     Transplant IS regimen:  Per cardiology: Cellcept 500 mg po BID, Prednisone 10 mg in AM and 5 mg at HS. Tac level pending. Goal-6-8. If 9/5 biopsy negative plan is to decrease Prednisone per protocol and consider reduction in Cellcept if no improvement in Leukopenia.       BMD: Ca 8.0, alb 1.8, phos 1.7  - Phos  "supplement added to his dialysis today    GIB: resolved    Anemia: hgb 8.3; last transfusion 8/29.    - continue epogen to 10,000 units      Pericolonic abscess, small perircal abscess: Underwent I/D anorectal abscess on 8/13 and  Sigmoidectomy/end colostomy on 8/14.      C diff: on PO vanc completed 9/6      Lung nodules - New Right lower lobe pulmonary nodules seen on CT this admission. ID recommending close f/u.  Has repeat CT 9/16.        Recommendations were communicated to primary team via progress note     Lauren Anderson APRN, CNP  Division of Kidney Disease  Pager [phone]          Interval History :   Seen on dialysis, stable run. Denies CP, SOB, n/v.       Review of Systems:   4 point ROS negative other than as noted above.    Physical Exam:   I/O last 3 completed shifts:  In: 1023 [P.O.:920; I.V.:103]  Out: 350 [Urine:350]   /88  Pulse 106  Temp 98.2  F (36.8  C) (Oral)  Resp 18  Ht 1.753 m (5' 9\")  Wt 80 kg (176 lb 5.9 oz)  SpO2 99%  BMI 26.05 kg/m2     GENERAL APPEARANCE: alert, NAD  EYES: no scleral icterus, pupils equal  Pulmonary: normal work of breathing  CV: WWP   - Edema 1-2+ upper and lower extremity edema  NEURO: alert/oriented, face symmetric and speech is fluent  ACCESS: Right TDC    Labs:   All labs reviewed by me  Electrolytes/Renal -   Recent Labs   Lab Test  09/08/18   0612  09/07/18   1819  09/07/18   0534  09/06/18   0705  09/05/18   0642   09/03/18   0616   NA  134   --   136  140  138   < >  136   POTASSIUM  3.8   --   3.9  4.5  4.1   < >  4.0   CHLORIDE  98   --   99  106  106   < >  100   CO2  30   --   31  28  28   < >  28   BUN  20   --   12  24  16   < >  22   CR  4.61*   --   3.49*  5.48*  4.17*   < >  4.67*   GLC  110*   --   78  132*  114*   < >  100*   TRINI  8.0*   --   8.0*  8.0*  8.2*   < >  7.8*   MAG   --   2.6*  1.5*   --   1.7   --   1.8   PHOS   --    --   1.7*   --   2.2*   --   2.2*    < > = values in this interval not displayed.       CBC -   Recent Labs "   Lab Test  09/08/18   0612  09/07/18   0534  09/06/18   1826  09/06/18   0705   WBC  1.0*  1.2*   --   1.2*   HGB  8.3*  8.1*  8.1*  8.2*   PLT  139*  126*   --   131*       LFTs -   Recent Labs   Lab Test  09/03/18   0616  08/31/18   0646  08/27/18   0644   ALKPHOS  138  133  133   BILITOTAL  0.5  0.5  0.5   ALT  14  14  13   AST  17  17  16   PROTTOTAL  5.2*  4.7*  5.2*   ALBUMIN  1.9*  1.6*  1.8*       Iron Panel -   Recent Labs   Lab Test  07/10/18   0711  05/08/18   0954  07/19/17   1306   IRON  66  58  46   IRONSAT  28  27  18   ALBERTINA  771*  621*  369       Current Medications:    amLODIPine  7.5 mg Oral Daily     atorvastatin  20 mg Oral QPM     fluticasone  1-2 spray Both Nostrils Daily     gelatin absorbable  1 each Topical During Hemodialysis (from stock)     heparin  3 mL Intracatheter During Hemodialysis (from stock)     heparin  3 mL Intracatheter During Hemodialysis (from stock)     hydrALAZINE  75 mg Oral Q8H JEAN     insulin aspart  1-6 Units Subcutaneous TID w/meals     insulin aspart  1-5 Units Subcutaneous At Bedtime     insulin glargine  20 Units Subcutaneous QAM AC     mycophenolate  250 mg Oral BID     NEPHROCAPS  1 capsule Oral Daily     nystatin  1,000,000 Units Swish & Swallow 4x Daily     pantoprazole  40 mg Oral QAM AC     predniSONE  5 mg Oral BID w/meals     sodium chloride (PF)  3 mL Intracatheter During Hemodialysis (from stock)     sodium chloride (PF)  3 mL Intracatheter During Hemodialysis (from stock)     sodium chloride (PF)  3 mL Intracatheter Q8H     tacrolimus  2 mg Oral BID IS     triamcinolone   Topical BID       IV fluid REPLACEMENT ONLY       VERONICA Robbins CNP-C[AS1.1]     Revision History        User Key Date/Time User Provider Type Action    > AS1.1 9/8/2018 11:25 AM Lauren Anderson APRN CNP Nurse Practitioner Sign                     Procedure Notes      Procedures by Darvin Sharpe MD at 9/5/2018 12:39 PM     Author:  Darvin Sharpe MD Service:   Cardiology Author Type:  Fellow    Filed:  9/5/2018 12:57 PM Date of Service:  9/5/2018 12:39 PM Creation Time:  9/5/2018 12:39 PM    Status:  Attested :  Darvin Sharpe MD (Fellow)    Cosigner:  Klever Ernst MD at 9/9/2018 10:39 PM        Attestation signed by Klever Ernst MD at 9/9/2018 10:39 PM        Klever Ernst MD   [facility]  Heart Failure, Transplant, and Mechanical Circulatory Support Cardiology   Cardiovascular Division  [facility]   [phone]                                 PRELIMINARY CARDIAC CATH REPORT:     PROCEDURES PERFORMED:   Right Heart Catheterization  Endomyocardial Biopsy    PHYSICIANS:  Attending Physician: Klever Ernst  Cardiology Fellow: Darvin Sharpe    INDICATION:  Murray Nicholson is a 63 year old male with a history of non-ischemic cardiomyopathy status post heart transplant on 6/14/2018, ESRD on hemodialysis and right internal jugular thrombosis, who presents to the cath lab for routine post-transplant surveillance right heart catheterization and endomyocardial biopsy.      DESCRIPTION:  1. Consent obtained with discussion of risks.  All questions were answered.  2. Sterile prep and procedure.  3. Location with Sheaths:   Lf IJ  7 Fr 10 cm [short]. After right heart catheterization the short sheath was exchanged over an 80cm J-tip wire for a long directional sheath for the biopsy.  4. Access: Local anesthetic with lidocaine.  A micropuncture 21 guage needle with ultrasound guidance was used to establish vascular access using a modified Seldinger technique.  5. Diagnostic Catheters:   7 Fr  Catron Jax  6. Guiding Catheters:  None  6. Estimated blood loss: < 5 ml    MEDICATIONS:  Heart rate, BP, respiration, oxygen saturation and patient responses were monitored throughout the procedure with the assistance of the RN under my supervision.    Procedures:    HEMODYNAMICS:  BSA 2.0  1. HR 96  bpm  2. /81 mmHg  3. RA 5/8/4   4. RV 25/5  5. PA 30/15/20   6. PCW 10   7. PA sat 73.6%   8. PCW sat not obtained  9. Hgb 8.2 g/dL   10. Estimated Kassi CO 10.0   11. Estimated Kassi CI 5.06   12. TD CO 7.67   13. TD CI 3.88   14. PVR 1.0       ENDOMYOCARDIAL BIOPSY:  1. Successful collection of 4 right ventricular endomyocardial biopsy samples using the [place].      Sheath Removal:  The venous sheath was manually removed in the cardiac catheterization laboratory.    Contrast: Isovue, 5 ml     Fluoroscopy Time: 13.5 min    COMPLICATIONS:  1. None    SUMMARY:   >> Normal right sided filling pressures.  >> Normal left sided filling pressures.  >> Normal PA pressures  >> Normal cardiac output.  >> Successful collection of 4 right ventricular biopsy specimens    PLAN:   >>. Return to the primary inpatient team for further evaluation and management.    The attending interventional cardiologist was present and supervised all critical aspects the procedure.    See CVIS report for final draft.    Darvin Sharpe   Cardiology Fellow    Klever Ernst   Cardiology Staff[DS1.1]               Revision History        User Key Date/Time User Provider Type Action    > DS1.1 9/5/2018 12:57 PM Darvin Sharpe MD Fellow Sign            Procedures by Miah Mack MD at 8/24/2018  3:21 PM     Author:  Miah Mack MD Service:  Cardiology Author Type:  Fellow    Filed:  8/24/2018  3:25 PM Date of Service:  8/24/2018  3:21 PM Creation Time:  8/24/2018  3:21 PM    Status:  Attested :  Miah Mack MD (Fellow)    Cosigner:  Klever Ernst MD at 8/25/2018  9:04 PM        Attestation signed by Klever Ernst MD at 8/25/2018  9:04 PM (Updated)                                         PRELIMINARY REPORT:     PROCEDURES:   1. Right heart catheterization  2. Endomyocardial biopsy    PHYSICIANS:  1. Attending Interventional Cardiology Staff: Gilberto Mccann MD  2.  Interventional Cardiology Fellow: Miah Mack MD     HPI:  Murray Nicholson is a 63 year old male who underwent OHT 6/14/18 who presents on an urgent inpatient basis for hemodynamic assessment and endomyocardial biopsy.     DESCRIPTION:   Venous Location: left internal jugular vein  Access: Local anesthetic with lidocaine.  A standard (18 g) needle with ultrasound guidance was used to establish venous access.  Venous Sheath:7F standard sheath, 7Fr Daig sheath  Catheters:  7F Andrew Jax PA catheter, 5.5Fr Jaws bioptome    Right Heart Catheterization:  /80/103  HR 92  BSA 2.0    RA 2/1/1   RV 24/1  PA 24/14/17   PCW 5/4/3   PA sat 65.4%  PCW sat 93%   Hgb 7.0 g/dL   Kassi CO 7.5   Kassi CI 3.8   TD CO 6.0   TD CI 3.0   PVR 1.9  TPR 13.6    Endomyocardial biopsy  After informed consent patient was prepped and draped in the usual fashion. A 7 Indonesian sheath was placed in the left internal jugular after local anesthesia with 1.0 % lidocaine. A 5.5 Indonesian  bioptome was passed through the sheath to the right ventricular septum and 5 biopsies were obtained. The biopsy specimens were sent to Pathology for examination. The sheath was removed and hemostasis was obtained. The patient tolerated the procedure well and there were no complications.    COMPLICATIONS:   None    IMPRESSION:   1. Normal right-sided and normal left-sided filling pressures.   2. Normal pulmonary artery pressures with a mean pulmonary artery pressure of 17 mmHg.  3. Normal cardiac output (7.5 L/min) and cardiac index (3.8)  4. Successful endomyocardial biopsy with 5 specimens sent for pathologic review.    PLAN:   1. Continued medical management for cardiovascular risk factor optimization.  2. Continued management of heart transplant per inpatient Cards II team.    Findings discussed with inpatient Cards II team.     See CVIS report for final draft.      Miah Mack MD  Interventional Cardiology Fellow[BO1.1]       Revision History        User Key  Date/Time User Provider Type Action    > BO1.1 8/24/2018  3:25 PM Miah Mack MD Fellow Sign                  Progress Notes - Therapies (Notes from 09/08/18 through 09/11/18)     No notes of this type exist for this encounter.

## 2018-08-12 NOTE — IP AVS SNAPSHOT
MRN:[id number]                      After Visit Summary   8/12/2018    Murray Nicholson    MRN: [id number]           Thank you!     Thank you for choosing [facility] for your care. Our goal is always to provide you with excellent care. Hearing back from our patients is one way we can continue to improve our services. Please take a few minutes to complete the written survey that you may receive in the mail after you visit with us. Thank you!        Patient Information     Date Of Birth          1955        About your hospital stay     You were admitted on:  August 12, 2018 You last received care in the:  [address] [facility]    You were discharged on:  September 11, 2018        Reason for your hospital stay       You were hospitalized for abdominal pain and an active bleed due to rectal and colonic infection.                  Who to Call     For medical emergencies, please call 911.  For non-urgent questions about your medical care, please call your primary care provider or clinic, [phone]  For questions related to your surgery, please call your surgery clinic        Attending Provider     Provider Specialty    Russel Ramirez MD Emergency Medicine    Rawlings, Arcelia Mata MD Cardiology       Primary Care Provider Office Phone # Fax #    Yahir Alex Turcios [phone]      After Care Instructions     Activity - Ambulate in hallway       Every shift            Activity - Up ad edith           Advance Diet as Tolerated       Follow this diet upon discharge: Orders Placed This Encounter      Calorie Counts      Snacks/Supplements Adult: Other; Please offer Boost or Nepro with meals.; With Meals      Snacks/Supplements Adult: Boost Plus; Between Meals      Calorie Counts      Regular Diet Adult            Daily weights       Call Provider for weight gain of more than 2 pounds per day or 5 pounds per week.            General info for SNF       Length of Stay Estimate: Short  Term Care: Estimated # of Days <30  Condition at Discharge: Improving  Level of care:skilled   Rehabilitation Potential: Good  Admission H&P remains valid and up-to-date: Yes  Recent Chemotherapy: N/A  Use Nursing Home Standing Orders: Yes            Glucose monitor nursing POCT       Before meals and at bedtime            Intake and output       Every shift            Mantoux instructions       Give two-step Mantoux (PPD) Per Facility Policy Yes if not completed previously            Ostomy care       Standard Cares.  Type:  Colostomy.            Weight bearing status                 Follow-up Appointments     Follow Up and recommended labs and tests       [facility]   Phone: [phone]   Fax: [phone]     For resumption of outpatient dialysis Tuesday, Thursday, Saturday.            Follow Up and recommended labs and tests       CBC and BMP on dialysis days.  Weekly CMV PCR's.   Weekly Tacro levels.  Goal 6-8.                  Your next 10 appointments already scheduled     Oct 08, 2018 10:00 AM CDT   LAB with INGA LAB GOLD WAITING   [facility] [place], Lab ([facility])    [address] [address]              Please do not eat 10-12 hours before your appointment if you are coming in fasting for labs on lipids, cholesterol, or glucose (sugar). This does not apply to pregnant women. Water, hot tea and black coffee (with nothing added) are okay. Do not drink other fluids, diet soda or chew gum.            Oct 08, 2018 10:30 AM CDT   Procedure - 2.5 hour with U2A ROOM 17   [address] [facility] [place] ([facility])    [address] [facility] [address]               Oct 08, 2018 11:30 AM CDT   Heart Cath Heart Biopsy with UUHCVR3   [facility]    [address] [facility] [address] [phone]             Oct 08, 2018  4:00 PM CDT   (Arrive by 3:45 PM)   RETURN HEART TRANSPLANT with Klever Ernst MD   [facility] ([facility])    [address] [place] [address]  [address] [place] [address] [phone]            Oct 12, 2018  8:00 AM CDT   (Arrive by 7:45 AM)   RETURN HEART TRANSPLANT with VERONICA Rocha CNP   [facility] ([facility])    [address] [place] [address]  [address] [place] [address] [phone]            Nov 05, 2018  8:00 AM CST   LAB with U LAB GOLD WAITING   [facility] [place], Lab ([facility])    [address] [facility] [address]              Please do not eat 10-12 hours before your appointment if you are coming in fasting for labs on lipids, cholesterol, or glucose (sugar). This does not apply to pregnant women. Water, hot tea and black coffee (with nothing added) are okay. Do not drink other fluids, diet soda or chew gum.            Nov 05, 2018  8:30 AM CST   Procedure - 2.5 hour with U2A ROOM 17   [address] [facility] [place] ([facility])    [address] [facility] [address]               Nov 05, 2018  9:30 AM CST   Heart Cath Heart Biopsy with UUHCVR3   [facility]    [address] [facility] [address] [phone]            Nov 05, 2018  4:30 PM CST   (Arrive by 4:15 PM)   RETURN HEART TRANSPLANT with Klever Ernst MD   [facility] ([facility])    [address] [place] [address]  [address] [place] [address] [phone]            Nov 09, 2018  8:30 AM CST   (Arrive by 8:15 AM)   RETURN HEART TRANSPLANT with Cleo Marks NP   [facility] ([facility])    [address] [place] [address]  [address] [place] [address] [phone]              Additional Services      Medication Therapy Management Referral       MTM referral reason            Patient had a hospital or ED visit in last 6 months and has more than 10   PTA or Discharge medications    Patient has 5 PTA or Discharge Medications AND one of the following   diagnoses: DM,HF,COPD,AMI DX,PULM HTN       This service is designed to help you get the most from your medications.  A specially trained pharmacist will work closely with you and your doctors  to solve any problems related to your medications and to help you get the   best results from taking them.      The Medication Therapy Management staff will call you to schedule an appointment.            Occupational Therapy Adult Consult       Evaluate and treat as clinically indicated.    Reason: Continue therapy to help facilitate more IADL's.            Physical Therapy Adult Consult       Evaluate and treat as clinically indicated.    Reason:  Deconditioning s/p heart transplant                  Future tests that were ordered for you     Contact Isolation                 Further instructions from your care team         [facility]     Name: Murray Nicholson  Date: 9/4/18    To order your ostomy supplies    The ostomy Supplier needs this supply list  to process your order. You will need to fax/deliver this list, along with your Insurance information. Your home care nurse can assist with this process.  List of Ostomy Distributors      [facility]  Ph. [phone] Fax # [phone]  [facility]   Ph. [phone] ext- [phone]  [employer] [facility]   Ph. [phone]  [facility]   Ph. [phone] [phone]  Or Call your insurance provider for their preferred supplier    Your Medical Supplier will need your surgeon's name, phone and fax number    Clinic:                     Phone                            Fax  Colorectal Surgery:    [phone]  Verbal Order for ostomy supplies for 1 Month per:   Karly Jon RN CWON       Authorizing MD: Dr. Tran  Change pouch :                            Three times a week  Quantity of pouches-#15/mo    Request the following supplies:      [place]    1 piece flat fecal with filter #8331    or                Coloplast   Sensura Uriel  1 piece transparent flat pouch with filter #80199                              Accessories  2  barrier ring #1255     Adapt paste #05863     Adapt powder #7906    No sting film barrier # 3345                          Adapt odor eliminator and lubricant 236ml bottle # 68270     M-9 Spray room deodorizer #7732                           Brava elastic barrier strips curved # 249202                                                         Change your pouch twice a week, more often if leaking.    If you are cutting a hole in the wafer of your pouch, recheck stoma size and adjust pouch opening as needed every week    . Call the Ostomy Nurse at [facility]       [address] [place] [address], [place] : [phone]   Schedule a follow-up visit in 4 to 6 weeks after your surgery, sooner if having problems Bring a complete set of pouch-changing supplies to this visit      Problems you should Report  - The stoma turns blue or darker in color.  - Cuts or sores around the stoma.  - Red, raw or painful skin around the stoma.  - Any bulging of the skin around the stoma.  - A pouch that leaks every day.  - Problems making the right size hole in the pouch wafer.    Please call with any questions or concerns.      Pending Results     Date and Time Order Name Status Description    8/14/2018 1143 Platelets prepare order unit In process             Statement of Approval     Ordered          09/11/18 5778  I have reviewed and agree with all the recommendations and orders detailed in this document.  EFFECTIVE NOW     Approved and electronically signed by:  Kristi Ayon NP             Admission Information     Date & Time Provider Department  "Dept. Phone    8/12/2018 Arcelia Blum MD [address] [facility] [phone]      Your Vitals Were     Blood Pressure Pulse Temperature Respirations Height Weight    152/93 (BP Location: Right arm) 95 98.4  F (36.9  C) (Oral) 16 1.753 m (5' 9\") 80 kg (176 lb 6.4 oz)    Pulse Oximetry BMI (Body Mass Index)                100% 26.05 kg/m2          Framed Data Information     Framed Data gives you secure access to your electronic health record. If you see a primary care provider, you can also send messages to your care team and make appointments. If you have questions, please call your primary care clinic.  If you do not have a primary care provider, please call [phone] and they will assist you.        Care EveryWhere ID     This is your Care EveryWhere ID. This could be used by other organizations to access your [facility] medical records  [id number]        Equal Access to Services     JOHN HAUSER AH: Aubrie hernandez Sotracey, wahunterda luana, qaybta kaalmamerry crow, jose ayala. So [place] -[phone].    ATENCIÓN: Si habla español, tiene a ortiz disposición servicios gratuitos de asistencia lingüística. Llame al [phone].    We comply with applicable federal civil rights laws and [place] laws. We do not discriminate on the basis of race, color, national origin, age, disability, sex, sexual orientation, or gender identity.               Review of your medicines      START taking        Dose / Directions    amLODIPine 10 MG tablet   Commonly known as:  NORVASC   Used for:  Hypertension goal BP (blood pressure) < 130/80        Dose:  10 mg   Take 1 tablet (10 mg) by mouth daily   Quantity:  30 tablet   Refills:  0       glucagon 1 MG Solr injection   Used for:  Hypoglycemia        Dose:  1 mg   Inject 1 mg Subcutaneous every 15 minutes as needed for low blood sugar (May repeat x 1 only)   Refills:  0       glucose 40 % (400 mg/mL) Gel gel   Used for:  Hypoglycemia        " Dose:  15-30 g   Take 15-30 g by mouth every 15 minutes as needed for low blood sugar   Refills:  0       * insulin aspart 100 UNIT/ML injection   Commonly known as:  NovoLOG PEN   Used for:  Type 2 diabetes mellitus with hyperosmolarity without coma, with long-term current use of insulin (H)        Dose:  1-6 Units   Inject 1-6 Units Subcutaneous 3 times daily (with meals)   Refills:  0       * insulin aspart 100 UNIT/ML injection   Commonly known as:  NovoLOG PEN   Used for:  Type 2 diabetes mellitus with hypoglycemia without coma, unspecified long term insulin use status (H)        Dose:  1-5 Units   Inject 1-5 Units Subcutaneous At Bedtime   Refills:  0       insulin glargine 100 UNIT/ML injection   Commonly known as:  LANTUS        Dose:  16 Units   Start taking on:  9/12/2018   Inject 16 Units Subcutaneous every morning (before breakfast)   Refills:  0       pentamidine 300 MG neb solution   Commonly known as:  NEBUPENT   Indication:  PJP prophylaxis post OHT   Used for:  Heart replaced by transplant (H)        Dose:  300 mg   Start taking on:  9/14/2018   Inhale 300 mg into the lungs once for 1 dose   Quantity:  300 mg   Refills:  0       polyethylene glycol Packet   Commonly known as:  MIRALAX/GLYCOLAX   Used for:  Constipation, unspecified constipation type        Dose:  17 g   Take 17 g by mouth daily as needed for constipation   Quantity:  7 packet   Refills:  0       simethicone 80 MG chewable tablet   Commonly known as:  MYLICON   Used for:  Flatulence, eructation and gas pain        Dose:  80 mg   Take 1 tablet (80 mg) by mouth every 6 hours as needed for cramping   Quantity:  180 tablet   Refills:  0       * sodium chloride (PF) 0.9% PF flush        Dose:  3 mL   3 mLs by Intracatheter route every hour as needed for line flush (for peripheral IV flush post IV meds)   Refills:  0       * sodium chloride (PF) 0.9% PF flush        Dose:  3 mL   3 mLs by Intracatheter route every 8 hours   Refills:  0        Urea 40 % Crea   Used for:  Brittle nails        Externally apply topically daily   Quantity:  85 g   Refills:  1       * Notice:  This list has 4 medication(s) that are the same as other medications prescribed for you. Read the directions carefully, and ask your doctor or other care provider to review them with you.      CONTINUE these medicines which may have CHANGED, or have new prescriptions. If we are uncertain of the size of tablets/capsules you have at home, strength may be listed as something that might have changed.        Dose / Directions    hydrALAZINE 100 MG Tabs tablet   Commonly known as:  APRESOLINE   This may have changed:    - medication strength  - how much to take  - when to take this   Used for:  Essential hypertension        Dose:  100 mg   Take 1 tablet (100 mg) by mouth every 8 hours   Quantity:  60 tablet   Refills:  0       lisinopril 2.5 MG tablet   Commonly known as:  PRINIVIL/Zestril   This may have changed:    - medication strength  - how much to take   Used for:  Heart transplant recipient (H)        Dose:  2.5 mg   Take 1 tablet (2.5 mg) by mouth daily   Refills:  0       predniSONE 5 MG tablet   Commonly known as:  DELTASONE   This may have changed:    - how much to take  - when to take this   Used for:  Heart transplant recipient (H)        Dose:  5 mg   Take 1 tablet (5 mg) by mouth 2 times daily (with meals)   Refills:  0       tacrolimus 1 MG capsule   Commonly known as:  GENERIC EQUIVALENT   This may have changed:    - how much to take  - how to take this  - when to take this  - additional instructions   Used for:  Heart transplant recipient (H)        Dose:  2 mg   Take 2 capsules (2 mg) by mouth 2 times daily   Refills:  0       traMADol 50 MG tablet   Commonly known as:  ULTRAM   This may have changed:  when to take this   Used for:  Moderate pain        Dose:  50 mg   Take 1 tablet (50 mg) by mouth every 12 hours as needed for moderate pain   Quantity:  10 tablet    Refills:  0         CONTINUE these medicines which have NOT CHANGED        Dose / Directions    acetaminophen 325 MG tablet   Commonly known as:  TYLENOL   Used for:  Acute post-operative pain        Dose:  650 mg   Take 2 tablets (650 mg) by mouth every 4 hours as needed for mild pain (multimodal surgical pain management along with NSAIDS and opioid medication as indicated based on pain control and physical function.)   Quantity:  100 tablet   Refills:  0       albuterol 108 (90 Base) MCG/ACT inhaler   Commonly known as:  PROAIR HFA/PROVENTIL HFA/VENTOLIN HFA        Dose:  2 puff   Inhale 2 puffs into the lungs every 4 hours as needed for shortness of breath / dyspnea or wheezing   Refills:  0       aspirin 81 MG tablet        Take 1 tablet (81 mg) by mouth at bedtime   Refills:  0       biotin 5 MG Caps   Commonly known as:  BIOTIN 5000   Used for:  Brittle nails        Dose:  5 mg   Take 5 mg by mouth daily   Quantity:  30 capsule   Refills:  3       blood glucose monitoring lancets   Used for:  Type 2 diabetes mellitus with stage 5 chronic kidney disease not on chronic dialysis, without long-term current use of insulin (H)        Use to test blood sugar 2-3 times daily or as directed.  Ok to substitute alternative if insurance prefers.   Quantity:  102 each   Refills:  11       blood glucose monitoring test strip   Commonly known as:  no brand specified   Used for:  Type 2 diabetes mellitus with stage 5 chronic kidney disease not on chronic dialysis, without long-term current use of insulin (H)        Use to test blood sugar 2-3 times daily or as directed.   Quantity:  100 each   Refills:  11       fluticasone 50 MCG/ACT spray   Commonly known as:  FLONASE   Used for:  Nasal congestion        Dose:  1-2 spray   Spray 1-2 sprays into both nostrils daily   Quantity:  16 g   Refills:  11       loratadine 10 MG tablet   Commonly known as:  CLARITIN   Used for:  Hypertensive cardiopathy, SOB (shortness of breath)         Dose:  10 mg   Take 10 mg by mouth daily Reported on 5/3/2017   Refills:  0       melatonin 1 MG Tabs tablet   Used for:  Heart transplanted (H)        Dose:  2 mg   Take 2 tablets (2 mg) by mouth nightly as needed for sleep   Quantity:  120 tablet   Refills:  1       NEPHROCAPS 1 MG capsule        Dose:  1 capsule   Take 1 capsule by mouth daily   Quantity:  120 capsule   Refills:  0       nystatin 550778 UNIT/ML suspension   Commonly known as:  MYCOSTATIN   Used for:  Heart transplant recipient (H)        Dose:  9500887 Units   Swish and swallow 10 mLs (1,000,000 Units) in mouth 4 times daily   Quantity:  400 mL   Refills:  2       order for DME   Used for:  CKD (chronic kidney disease) stage 5, GFR less than 15 ml/min (H)        Equipment being ordered: Carol () Treatment Diagnosis: ESRD on PD Pt has to be connected to PD all night and can not be disconnected, hence impending his mobility to go to the bathroom. At risk for infection if he does not have this equipment.   Quantity:  1 Units   Refills:  0       pantoprazole 40 MG EC tablet   Commonly known as:  PROTONIX   Used for:  Heart transplant recipient (H)        Dose:  40 mg   Take 1 tablet (40 mg) by mouth every morning   Quantity:  30 tablet   Refills:  11       rosuvastatin 20 MG tablet   Commonly known as:  CRESTOR   Used for:  Heart transplant recipient (H)        Dose:  20 mg   Take 1 tablet (20 mg) by mouth daily   Quantity:  30 tablet   Refills:  1       triamcinolone 0.1 % ointment   Commonly known as:  KENALOG   Used for:  Xerosis of skin        Apply topically 2 times daily   Quantity:  80 g   Refills:  0         STOP taking     apixaban ANTICOAGULANT 2.5 MG tablet   Commonly known as:  ELIQUIS           cloNIDine 0.1 MG tablet   Commonly known as:  CATAPRES           mycophenolate 250 MG capsule   Commonly known as:  GENERIC EQUIVALENT           pravastatin 40 MG tablet   Commonly known as:  PRAVACHOL                Where to get  your medicines      These medications were sent to [facility] [place], [place] - [address] [facility] [address] [facility]  [address] [facility] [address]     Phone:  [phone]     Urea 40 % Crea         Some of these will need a paper prescription and others can be bought over the counter. Ask your nurse if you have questions.     Bring a paper prescription for each of these medications     traMADol 50 MG tablet                Protect others around you: Learn how to safely use, store and throw away your medicines at www.disposemymeds.org.        ANTIBIOTIC INSTRUCTION     You've Been Prescribed an Antibiotic - Now What?  Your healthcare team thinks that you or your loved one might have an infection. Some infections can be treated with antibiotics, which are powerful, life-saving drugs. Like all medications, antibiotics have side effects and should only be used when necessary. There are some important things you should know about your antibiotic treatment.      Your healthcare team may run tests before you start taking an antibiotic.    Your team may take samples (e.g., from your blood, urine or other areas) to run tests to look for bacteria. These test can be important to determine if you need an antibiotic at all and, if you do, which antibiotic will work best.      Within a few days, your healthcare team might change or even stop your antibiotic.    Your team may start you on an antibiotic while they are working to find out what is making you sick.    Your team might change your antibiotic because test results show that a different antibiotic would be better to treat your infection.    In some cases, once your team has more information, they learn that you do not need an antibiotic at all. They may find out that you don't have an infection, or that the antibiotic you're taking won't work against your infection. For example, an infection caused by a virus can't be treated with antibiotics. Staying on an  antibiotic when you don't need it is more likely to be harmful than helpful.      You may experience side effects from your antibiotic.    Like all medications, antibiotics have side effects. Some of these can be serious.    Let you healthcare team know if you have any known allergies when you are admitted to the hospital.    One significant side effect of nearly all antibiotics is the risk of severe and sometimes deadly diarrhea caused by Clostridium difficile (C. Difficile). This occurs when a person takes antibiotics because some good germs are destroyed. Antibiotic use allows C. diificile to take over, putting patients at high risk for this serious infection.    As a patient or caregiver, it is important to understand your or your loved one's antibiotic treatment. It is especially important for caregivers to speak up when patients can't speak for themselves. Here are some important questions to ask your healthcare team.    What infection is this antibiotic treating and how do you know I have that infection?    What side effects might occur from this antibiotic?    How long will I need to take this antibiotic?    Is it safe to take this antibiotic with other medications or supplements (e.g., vitamins) that I am taking?     Are there any special directions I need to know about taking this antibiotic? For example, should I take it with food?    How will I be monitored to know whether my infection is responding to the antibiotic?    What tests may help to make sure the right antibiotic is prescribed for me?      Information provided by:  www.cdc.gov/getsmart  U.S. Department of Health and Human Services  Centers for disease Control and Prevention  National Center for Emerging and Zoonotic Infectious Diseases  Division of Healthcare Quality Promotion        Information about OPIOIDS     PRESCRIPTION OPIOIDS: WHAT YOU NEED TO KNOW   We gave you an opioid (narcotic) pain medicine. It is important to manage your pain,  but opioids are not always the best choice. You should first try all the other options your care team gave you. Take this medicine for as short a time (and as few doses) as possible.    Some activities can increase your pain, such as bandage changes or therapy sessions. It may help to take your pain medicine 30 to 60 minutes before these activities. Reduce your stress by getting enough sleep, working on hobbies you enjoy and practicing relaxation or meditation. Talk to your care team about ways to manage your pain beyond prescription opioids.    These medicines have risks:    DO NOT drive when on new or higher doses of pain medicine. These medicines can affect your alertness and reaction times, and you could be arrested for driving under the influence (DUI). If you need to use opioids long-term, talk to your care team about driving.    DO NOT operate heavy machinery    DO NOT do any other dangerous activities while taking these medicines.    DO NOT drink any alcohol while taking these medicines.     If the opioid prescribed includes acetaminophen, DO NOT take with any other medicines that contain acetaminophen. Read all labels carefully. Look for the word  acetaminophen  or  Tylenol.  Ask your pharmacist if you have questions or are unsure.    You can get addicted to pain medicines, especially if you have a history of addiction (chemical, alcohol or substance dependence). Talk to your care team about ways to reduce this risk.    All opioids tend to cause constipation. Drink plenty of water and eat foods that have a lot of fiber, such as fruits, vegetables, prune juice, apple juice and high-fiber cereal. Take a laxative (Miralax, milk of magnesia, Colace, Senna) if you don t move your bowels at least every other day. Other side effects include upset stomach, sleepiness, dizziness, throwing up, tolerance (needing more of the medicine to have the same effect), physical dependence and slowed breathing.    Store your  pills in a secure place, locked if possible. We will not replace any lost or stolen medicine. If you don t finish your medicine, please throw away (dispose) as directed by your pharmacist. The [place] [employer] has more information about safe disposal: https://www.pca.state.[place]/living-green/managing-unwanted-medications             Medication List: This is a list of all your medications and when to take them. Check marks below indicate your daily home schedule. Keep this list as a reference.      Medications           Morning Afternoon Evening Bedtime As Needed    acetaminophen 325 MG tablet   Commonly known as:  TYLENOL   Take 2 tablets (650 mg) by mouth every 4 hours as needed for mild pain (multimodal surgical pain management along with NSAIDS and opioid medication as indicated based on pain control and physical function.)   Last time this was given:  1,000 mg on 9/5/2018 12:02 PM                                albuterol 108 (90 Base) MCG/ACT inhaler   Commonly known as:  PROAIR HFA/PROVENTIL HFA/VENTOLIN HFA   Inhale 2 puffs into the lungs every 4 hours as needed for shortness of breath / dyspnea or wheezing                                amLODIPine 10 MG tablet   Commonly known as:  NORVASC   Take 1 tablet (10 mg) by mouth daily   Last time this was given:  10 mg on 9/11/2018  5:17 PM                                aspirin 81 MG tablet   Take 1 tablet (81 mg) by mouth at bedtime                                biotin 5 MG Caps   Commonly known as:  BIOTIN 5000   Take 5 mg by mouth daily                                blood glucose monitoring lancets   Use to test blood sugar 2-3 times daily or as directed.  Ok to substitute alternative if insurance prefers.                                blood glucose monitoring test strip   Commonly known as:  no brand specified   Use to test blood sugar 2-3 times daily or as directed.                                fluticasone 50 MCG/ACT spray   Commonly  known as:  FLONASE   Spray 1-2 sprays into both nostrils daily   Last time this was given:  2 sprays on 8/13/2018  8:24 AM                                glucagon 1 MG Solr injection   Inject 1 mg Subcutaneous every 15 minutes as needed for low blood sugar (May repeat x 1 only)                                glucose 40 % (400 mg/mL) Gel gel   Take 15-30 g by mouth every 15 minutes as needed for low blood sugar                                hydrALAZINE 100 MG Tabs tablet   Commonly known as:  APRESOLINE   Take 1 tablet (100 mg) by mouth every 8 hours   Last time this was given:  100 mg on 9/11/2018  5:17 PM                                * insulin aspart 100 UNIT/ML injection   Commonly known as:  NovoLOG PEN   Inject 1-6 Units Subcutaneous 3 times daily (with meals)   Last time this was given:  1 Units on 9/10/2018 10:11 PM                                * insulin aspart 100 UNIT/ML injection   Commonly known as:  NovoLOG PEN   Inject 1-5 Units Subcutaneous At Bedtime   Last time this was given:  1 Units on 9/10/2018 10:11 PM                                insulin glargine 100 UNIT/ML injection   Commonly known as:  LANTUS   Inject 16 Units Subcutaneous every morning (before breakfast)   Start taking on:  9/12/2018   Last time this was given:  16 Units on 9/11/2018  8:23 AM                                lisinopril 2.5 MG tablet   Commonly known as:  PRINIVIL/Zestril   Take 1 tablet (2.5 mg) by mouth daily                                loratadine 10 MG tablet   Commonly known as:  CLARITIN   Take 10 mg by mouth daily Reported on 5/3/2017                                melatonin 1 MG Tabs tablet   Take 2 tablets (2 mg) by mouth nightly as needed for sleep                                NEPHROCAPS 1 MG capsule   Take 1 capsule by mouth daily   Last time this was given:  1 capsule on 9/11/2018  8:25 AM                                nystatin 091964 UNIT/ML suspension   Commonly known as:  MYCOSTATIN   Swish and  swallow 10 mLs (1,000,000 Units) in mouth 4 times daily   Last time this was given:  1,000,000 Units on 9/11/2018  5:17 PM                                order for DME   Equipment being ordered: Carol () Treatment Diagnosis: ESRD on PD Pt has to be connected to PD all night and can not be disconnected, hence impending his mobility to go to the bathroom. At risk for infection if he does not have this equipment.                                pantoprazole 40 MG EC tablet   Commonly known as:  PROTONIX   Take 1 tablet (40 mg) by mouth every morning   Last time this was given:  40 mg on 9/11/2018  8:25 AM                                pentamidine 300 MG neb solution   Commonly known as:  NEBUPENT   Inhale 300 mg into the lungs once for 1 dose   Start taking on:  9/14/2018   Last time this was given:  300 mg on 8/17/2018  8:25 AM                                polyethylene glycol Packet   Commonly known as:  MIRALAX/GLYCOLAX   Take 17 g by mouth daily as needed for constipation   Last time this was given:  17 g on 8/27/2018 12:13 PM                                predniSONE 5 MG tablet   Commonly known as:  DELTASONE   Take 1 tablet (5 mg) by mouth 2 times daily (with meals)   Last time this was given:  5 mg on 9/11/2018  5:16 PM                                rosuvastatin 20 MG tablet   Commonly known as:  CRESTOR   Take 1 tablet (20 mg) by mouth daily   Last time this was given:  20 mg on 8/18/2018 12:07 PM                                simethicone 80 MG chewable tablet   Commonly known as:  MYLICON   Take 1 tablet (80 mg) by mouth every 6 hours as needed for cramping                                * sodium chloride (PF) 0.9% PF flush   3 mLs by Intracatheter route every hour as needed for line flush (for peripheral IV flush post IV meds)   Last time this was given:  3 mLs on 9/11/2018  8:30 AM                                * sodium chloride (PF) 0.9% PF flush   3 mLs by Intracatheter route every 8 hours    Last time this was given:  3 mLs on 9/11/2018  8:30 AM                                tacrolimus 1 MG capsule   Commonly known as:  GENERIC EQUIVALENT   Take 2 capsules (2 mg) by mouth 2 times daily   Last time this was given:  2 mg on 9/11/2018  5:16 PM                                traMADol 50 MG tablet   Commonly known as:  ULTRAM   Take 1 tablet (50 mg) by mouth every 12 hours as needed for moderate pain   Last time this was given:  50 mg on 9/10/2018 10:12 PM                                triamcinolone 0.1 % ointment   Commonly known as:  KENALOG   Apply topically 2 times daily   Last time this was given:  8/14/2018  8:06 PM                                Urea 40 % Crea   Externally apply topically daily                                * Notice:  This list has 4 medication(s) that are the same as other medications prescribed for you. Read the directions carefully, and ask your doctor or other care provider to review them with you.

## 2018-08-12 NOTE — IP AVS SNAPSHOT
` `       Patient Information     Patient Name Sex     Murray Nicholson ([id number]) Male 1955       Room Bed    6404 6404-01      Patient Demographics     Address Phone E-mail Address    [address] [place] [id number] [phone] (Home)  [phone] (Mobile) *Preferred* [email or web address]      Patient Ethnicity & Race     Ethnic Group Patient Race    American       Emergency Contact(s)     Name Relation Home Work Mobile    Jasper Nicholson Son [phone]    Naresh Peck Friend [phone]    Jordi Nicholson Son [phone]      Documents on File        Status Date Received Description       Documents for the Patient    Insurance Card  ()      Face Sheet Received () 09     Privacy Notice - [place] Received 03     Insurance Card  () 04 Preferred One    Insurance Card  () 05 Preferred One    Insurance Card  () 06 [employer]    Insurance Card  () 07 Cigna    Insurance Card  () 08     [facility]  () 09 [facility]    External Medication Information Consent Accepted () 02/09/10     Face Sheet Received () 02/09/10     Insurance Card  () 02/09/10     Patient ID Received () 05/08/15 [place]    Consent for Services - Hospital/Clinic Received () 12/29/10     Insurance Card Received () 12/29/10 [facility]    External Medication Information Consent Accepted () 11     Consent for Services - Hospital/Clinic Received () 11     Consent for Services - Hospital/Clinic       Insurance Card Received () 12 [facility]    Insurance Card  ()  [facility]    External Medication Information Consent Accepted () 12     Consent for Services - Hospital/Clinic Received () 12     Insurance Card Received () 12 [facility]    Consent for EHR Access   13 Copied from existing Consent for services - C/HOD collected on 2012    [facility] Specified Other       Consent for Services - Hospital/Clinic  () 13 CONSENT FOR SERVICE    Consent for Services - [facility] Received 13 imaging center     Consent for Services - [facility]  13 GENERAL CONSENT FORM: SHARED EHR - ENGLISH    Consent for Services - Hospital/Clinic Received () 13     External Medication Information Consent Accepted 13     Insurance Card Received () 14     Consent for Services - Hospital/Clinic Received () 04/09/15     Advance Directives and Living Will Received 07/30/15 HEALTH CARE DIRECTIVE 7/24/15    Advance Directives and Living Will Not Received  VALIDTION OF AD 7/24/15    Privacy Notice - [place] Received 07/30/15     Insurance Card Received () 16 HP    Business/Insurance/Care Coordination/Health Form - Patient  16 FMLA    Consent for Services/Privacy Notice - Hospital/Clinic Received () 02/10/16     Consent for Services/Privacy Notice - Hospital/Clinic  () 16 CONSENT FOR SERVICE/PRIVACY NOTICE    HIM DAVID Authorization  16 Cigna    Insurance Card Received () 16 hp cigna    Consent for Services/Privacy Notice - Hospital/Clinic-Esign       Business/Insurance/Care Coordination/Health Form - Patient   Cigna Aranesp Approval 6/15/16-7/15/17    Insurance Card Received 16 HP/CIGNA    Business/Insurance/Care Coordination/Health Form - Patient   Cigna Injectafer Approval 16-17    Consent for Services/Privacy Notice - Hospital/Clinic Received () 02/15/17     Insurance Card Received 02/15/17 [place] CIGNA    Patient ID Received 02/15/17 [place]    HIM DAVID Authorization  17 unknown/[facility]    HIM DAVID Authorization  17 GUARDIAN/ [facility]    Physical Therapy Certification Received 07/10/17 DOS 17, NO Cert Needed, IP    Business/Insurance/Care Coordination/Health Form -  Patient   CIGNA - ARANESP APPROVAL - 17-07/15/18 - 24 TREATMENTS    Care Everywhere Prospective Auth Received 17     Insurance Card Received 18 HP    Business/Insurance/Care Coordination/Health Form - Patient  18 TREATMENT AND SLEF MANAGEMENT PLAN REQUEST FORM 18 CIGNA    Business/Insurance/Care Coordination/Health Form - Patient  18 DISABILITY FORMS 18    Consent for Services/Privacy Notice - Hospital/Clinic Received 18     HIM DAVID Authorization - File Only  18 [facility]    Consent for Services - Hospital and Clinic Received 18     HIE Auth Received 18     Business/Insurance/Care Coordination/Health Form - Patient  18 END STAGE RENAL DISEASE MEDICAL EVIDENCE REPORT 7/10/17    Business/Insurance/Care Coordination/Health Form - Patient  18 END STAGE RENAL DISEASE MEDICAL EVIDENCE REPORT 7/10/17    HIM DAVID Authorization  18 [facility]    CE Prospective Auth Received () 18 Authorization Document from Social Security Administration    System-Retrieved CE Auth Form Received () 18 External Authorization Form from Social Security Administration       Documents for the Encounter    CMS IM for Patient Signature Received 18     Monitoring Device Output  09/10/18 MONITORING STRIPS SHEET 2    Monitoring Device Output  09/10/18 MONITORING STRIPS SHEET 2    Cardiac Cath Preliminary - HIM Scan   Preliminary Cardiac Cath Report    Cardiac Cath - HIM Scan   Mac Lab Report    Cardiac Cath - HIM Scan   Mac Lab Report    Cardiac Cath Preliminary - HIM Scan   Cardiac Cath Preliminary    Cardiac Cath - HIM Scan   Final Report Cath Lab      Admission Information     Attending Provider Admitting Provider Admission Type Admission Date/Time    Arcelia Blum MD Cogswell, Rebecca Jane, MD Emergency 18  0222    Discharge Date Hospital Service Auth/Cert Status Service Area     Cardiology  Incomplete [facility]    Unit Room/Bed Admission Status       U [facility] 6404/6404-01 Admission (Confirmed)       Admission     Complaint    Bacteremia due to Pseudomonas, Anal Rectal Abcess, Sigmoid Diverticulitis , Sigmoid diverticulitis      Hospital Account     Name Acct ID Class Status Primary Coverage    Murray Nicholson [id number] Inpatient Open MEDICARE - MEDICARE            Guarantor Account (for Hospital Account #[id number])     Name Relation to Pt Service Area Active? Acct Type    Murray Nicholson Self FCS Yes Personal/Family    Address Phone          [address]  [place] [address] [phone](H)  [phone](O)              Coverage Information (for Hospital Account #[id number])     1. MEDICARE/MEDICARE     F/O Payor/Plan Precert #    MEDICARE/MEDICARE     Subscriber Subscriber Renea WhitingreraMurray [id number]    Address Phone    ATTN CLAIMS  [address]  [facility] [address] [phone]          2. COMMERCIAL/OTHER     F/O Payor/Plan Precert #    COMMERCIAL/OTHER     Subscriber Subscriber #    Murray Nicholson [id number]    Address Phone    [address] [address]  [place] [address] [phone]

## 2018-08-13 ENCOUNTER — ANESTHESIA EVENT (OUTPATIENT)
Dept: SURGERY | Facility: CLINIC | Age: 63
DRG: 329 | End: 2018-08-13
Payer: MEDICARE

## 2018-08-13 LAB
ALBUMIN SERPL-MCNC: 1.6 G/DL (ref 3.4–5)
ALP SERPL-CCNC: 99 U/L (ref 40–150)
ALT SERPL W P-5'-P-CCNC: 17 U/L (ref 0–70)
ANION GAP SERPL CALCULATED.3IONS-SCNC: 12 MMOL/L (ref 3–14)
ANION GAP SERPL CALCULATED.3IONS-SCNC: 9 MMOL/L (ref 3–14)
AST SERPL W P-5'-P-CCNC: 28 U/L (ref 0–45)
BASOPHILS # BLD AUTO: 0 10E9/L (ref 0–0.2)
BASOPHILS NFR BLD AUTO: 0 %
BILIRUB DIRECT SERPL-MCNC: 0.1 MG/DL (ref 0–0.2)
BILIRUB SERPL-MCNC: 0.3 MG/DL (ref 0.2–1.3)
BUN SERPL-MCNC: 34 MG/DL (ref 7–30)
BUN SERPL-MCNC: 38 MG/DL (ref 7–30)
CALCIUM SERPL-MCNC: 7.8 MG/DL (ref 8.5–10.1)
CALCIUM SERPL-MCNC: 7.9 MG/DL (ref 8.5–10.1)
CHLORIDE SERPL-SCNC: 101 MMOL/L (ref 94–109)
CHLORIDE SERPL-SCNC: 103 MMOL/L (ref 94–109)
CMV DNA SPEC NAA+PROBE-ACNC: NORMAL [IU]/ML
CMV DNA SPEC NAA+PROBE-LOG#: NORMAL {LOG_IU}/ML
CMV IGG SERPL QL IA: 0.4 AI (ref 0–0.8)
CMV IGM SERPL QL IA: <0.2 AI (ref 0–0.8)
CO2 SERPL-SCNC: 23 MMOL/L (ref 20–32)
CO2 SERPL-SCNC: 24 MMOL/L (ref 20–32)
COPATH REPORT: NORMAL
CREAT SERPL-MCNC: 5.27 MG/DL (ref 0.66–1.25)
CREAT SERPL-MCNC: 5.72 MG/DL (ref 0.66–1.25)
DIFFERENTIAL METHOD BLD: ABNORMAL
EOSINOPHIL # BLD AUTO: 0 10E9/L (ref 0–0.7)
EOSINOPHIL NFR BLD AUTO: 0.2 %
ERYTHROCYTE [DISTWIDTH] IN BLOOD BY AUTOMATED COUNT: 21 % (ref 10–15)
G LAMBLIA AG STL QL IA: NORMAL
GFR SERPL CREATININE-BSD FRML MDRD: 10 ML/MIN/1.7M2
GFR SERPL CREATININE-BSD FRML MDRD: 11 ML/MIN/1.7M2
GLUCOSE BLDC GLUCOMTR-MCNC: 102 MG/DL (ref 70–99)
GLUCOSE BLDC GLUCOMTR-MCNC: 118 MG/DL (ref 70–99)
GLUCOSE BLDC GLUCOMTR-MCNC: 28 MG/DL (ref 70–99)
GLUCOSE BLDC GLUCOMTR-MCNC: 64 MG/DL (ref 70–99)
GLUCOSE BLDC GLUCOMTR-MCNC: 67 MG/DL (ref 70–99)
GLUCOSE BLDC GLUCOMTR-MCNC: 78 MG/DL (ref 70–99)
GLUCOSE BLDC GLUCOMTR-MCNC: 80 MG/DL (ref 70–99)
GLUCOSE BLDC GLUCOMTR-MCNC: 82 MG/DL (ref 70–99)
GLUCOSE BLDC GLUCOMTR-MCNC: 86 MG/DL (ref 70–99)
GLUCOSE BLDC GLUCOMTR-MCNC: 91 MG/DL (ref 70–99)
GLUCOSE BLDC GLUCOMTR-MCNC: 91 MG/DL (ref 70–99)
GLUCOSE BLDC GLUCOMTR-MCNC: 97 MG/DL (ref 70–99)
GLUCOSE SERPL-MCNC: 40 MG/DL (ref 70–99)
GLUCOSE SERPL-MCNC: 81 MG/DL (ref 70–99)
HCT VFR BLD AUTO: 27 % (ref 40–53)
HGB BLD-MCNC: 8.6 G/DL (ref 13.3–17.7)
HGB BLD-MCNC: 9 G/DL (ref 13.3–17.7)
IMM GRANULOCYTES # BLD: 0.1 10E9/L (ref 0–0.4)
IMM GRANULOCYTES NFR BLD: 1.5 %
LMWH PPP CHRO-ACNC: 0.47 IU/ML
LYMPHOCYTES # BLD AUTO: 0.7 10E9/L (ref 0.8–5.3)
LYMPHOCYTES NFR BLD AUTO: 13.8 %
MAGNESIUM SERPL-MCNC: 2.5 MG/DL (ref 1.6–2.3)
MAGNESIUM SERPL-MCNC: 2.5 MG/DL (ref 1.6–2.3)
MCH RBC QN AUTO: 28.7 PG (ref 26.5–33)
MCHC RBC AUTO-ENTMCNC: 31.9 G/DL (ref 31.5–36.5)
MCV RBC AUTO: 90 FL (ref 78–100)
MONOCYTES # BLD AUTO: 0.7 10E9/L (ref 0–1.3)
MONOCYTES NFR BLD AUTO: 13.6 %
NEUTROPHILS # BLD AUTO: 3.7 10E9/L (ref 1.6–8.3)
NEUTROPHILS NFR BLD AUTO: 70.9 %
NRBC # BLD AUTO: 0 10*3/UL
NRBC BLD AUTO-RTO: 0 /100
PLATELET # BLD AUTO: 117 10E9/L (ref 150–450)
POTASSIUM SERPL-SCNC: 3.8 MMOL/L (ref 3.4–5.3)
POTASSIUM SERPL-SCNC: 3.9 MMOL/L (ref 3.4–5.3)
PROCALCITONIN SERPL-MCNC: 33.03 NG/ML
PROT SERPL-MCNC: 5.2 G/DL (ref 6.8–8.8)
RBC # BLD AUTO: 3 10E12/L (ref 4.4–5.9)
SODIUM SERPL-SCNC: 135 MMOL/L (ref 133–144)
SODIUM SERPL-SCNC: 136 MMOL/L (ref 133–144)
SPECIMEN SOURCE: NORMAL
SPECIMEN SOURCE: NORMAL
TRANSFERRIN SERPL-MCNC: 90 MG/DL (ref 210–360)
WBC # BLD AUTO: 5.2 10E9/L (ref 4–11)

## 2018-08-13 PROCEDURE — A9270 NON-COVERED ITEM OR SERVICE: HCPCS | Mod: GY | Performed by: COLON & RECTAL SURGERY

## 2018-08-13 PROCEDURE — 83735 ASSAY OF MAGNESIUM: CPT | Performed by: STUDENT IN AN ORGANIZED HEALTH CARE EDUCATION/TRAINING PROGRAM

## 2018-08-13 PROCEDURE — 25000128 H RX IP 250 OP 636: Performed by: STUDENT IN AN ORGANIZED HEALTH CARE EDUCATION/TRAINING PROGRAM

## 2018-08-13 PROCEDURE — 25000132 ZZH RX MED GY IP 250 OP 250 PS 637: Mod: GY | Performed by: STUDENT IN AN ORGANIZED HEALTH CARE EDUCATION/TRAINING PROGRAM

## 2018-08-13 PROCEDURE — 84145 PROCALCITONIN (PCT): CPT | Performed by: COLON & RECTAL SURGERY

## 2018-08-13 PROCEDURE — 00000146 ZZHCL STATISTIC GLUCOSE BY METER IP

## 2018-08-13 PROCEDURE — 84134 ASSAY OF PREALBUMIN: CPT | Performed by: COLON & RECTAL SURGERY

## 2018-08-13 PROCEDURE — 25000132 ZZH RX MED GY IP 250 OP 250 PS 637: Mod: GY | Performed by: COLON & RECTAL SURGERY

## 2018-08-13 PROCEDURE — 25000131 ZZH RX MED GY IP 250 OP 636 PS 637: Mod: GY | Performed by: STUDENT IN AN ORGANIZED HEALTH CARE EDUCATION/TRAINING PROGRAM

## 2018-08-13 PROCEDURE — 25000128 H RX IP 250 OP 636

## 2018-08-13 PROCEDURE — 80048 BASIC METABOLIC PNL TOTAL CA: CPT | Performed by: STUDENT IN AN ORGANIZED HEALTH CARE EDUCATION/TRAINING PROGRAM

## 2018-08-13 PROCEDURE — 80076 HEPATIC FUNCTION PANEL: CPT | Performed by: STUDENT IN AN ORGANIZED HEALTH CARE EDUCATION/TRAINING PROGRAM

## 2018-08-13 PROCEDURE — 25000125 ZZHC RX 250: Performed by: STUDENT IN AN ORGANIZED HEALTH CARE EDUCATION/TRAINING PROGRAM

## 2018-08-13 PROCEDURE — 99233 SBSQ HOSP IP/OBS HIGH 50: CPT | Mod: GC | Performed by: INTERNAL MEDICINE

## 2018-08-13 PROCEDURE — 25800025 ZZH RX 258

## 2018-08-13 PROCEDURE — 25000132 ZZH RX MED GY IP 250 OP 250 PS 637: Mod: GY | Performed by: INTERNAL MEDICINE

## 2018-08-13 PROCEDURE — 36415 COLL VENOUS BLD VENIPUNCTURE: CPT | Performed by: STUDENT IN AN ORGANIZED HEALTH CARE EDUCATION/TRAINING PROGRAM

## 2018-08-13 PROCEDURE — 25000128 H RX IP 250 OP 636: Performed by: COLON & RECTAL SURGERY

## 2018-08-13 PROCEDURE — 80076 HEPATIC FUNCTION PANEL: CPT | Performed by: COLON & RECTAL SURGERY

## 2018-08-13 PROCEDURE — G0463 HOSPITAL OUTPT CLINIC VISIT: HCPCS

## 2018-08-13 PROCEDURE — 85025 COMPLETE CBC W/AUTO DIFF WBC: CPT | Performed by: STUDENT IN AN ORGANIZED HEALTH CARE EDUCATION/TRAINING PROGRAM

## 2018-08-13 PROCEDURE — 85018 HEMOGLOBIN: CPT | Performed by: STUDENT IN AN ORGANIZED HEALTH CARE EDUCATION/TRAINING PROGRAM

## 2018-08-13 PROCEDURE — 21400003 ZZH R&B CCU CRITICAL UMMC

## 2018-08-13 PROCEDURE — 84466 ASSAY OF TRANSFERRIN: CPT | Performed by: COLON & RECTAL SURGERY

## 2018-08-13 PROCEDURE — A9270 NON-COVERED ITEM OR SERVICE: HCPCS | Mod: GY | Performed by: INTERNAL MEDICINE

## 2018-08-13 PROCEDURE — 25000131 ZZH RX MED GY IP 250 OP 636 PS 637: Mod: GY | Performed by: INTERNAL MEDICINE

## 2018-08-13 PROCEDURE — 36415 COLL VENOUS BLD VENIPUNCTURE: CPT | Performed by: INTERNAL MEDICINE

## 2018-08-13 PROCEDURE — 85520 HEPARIN ASSAY: CPT | Performed by: INTERNAL MEDICINE

## 2018-08-13 PROCEDURE — 25800025 ZZH RX 258: Performed by: STUDENT IN AN ORGANIZED HEALTH CARE EDUCATION/TRAINING PROGRAM

## 2018-08-13 RX ORDER — DEXTROSE, SODIUM CHLORIDE, SODIUM LACTATE, POTASSIUM CHLORIDE, AND CALCIUM CHLORIDE 5; .6; .31; .03; .02 G/100ML; G/100ML; G/100ML; G/100ML; G/100ML
INJECTION, SOLUTION INTRAVENOUS CONTINUOUS
Status: DISCONTINUED | OUTPATIENT
Start: 2018-08-13 | End: 2018-08-13

## 2018-08-13 RX ORDER — MAGNESIUM CARB/ALUMINUM HYDROX 105-160MG
296 TABLET,CHEWABLE ORAL ONCE
Status: DISCONTINUED | OUTPATIENT
Start: 2018-08-13 | End: 2018-08-13

## 2018-08-13 RX ORDER — DEXTROSE MONOHYDRATE 100 MG/ML
INJECTION, SOLUTION INTRAVENOUS CONTINUOUS
Status: DISPENSED | OUTPATIENT
Start: 2018-08-13 | End: 2018-08-14

## 2018-08-13 RX ORDER — DEXTROSE MONOHYDRATE 50 MG/ML
INJECTION, SOLUTION INTRAVENOUS CONTINUOUS
Status: DISCONTINUED | OUTPATIENT
Start: 2018-08-13 | End: 2018-08-13

## 2018-08-13 RX ORDER — SACCHAROMYCES BOULARDII 250 MG
250 CAPSULE ORAL 2 TIMES DAILY
Status: DISCONTINUED | OUTPATIENT
Start: 2018-08-13 | End: 2018-08-18

## 2018-08-13 RX ORDER — HYDROMORPHONE HYDROCHLORIDE 1 MG/ML
.3-.5 INJECTION, SOLUTION INTRAMUSCULAR; INTRAVENOUS; SUBCUTANEOUS
Status: DISCONTINUED | OUTPATIENT
Start: 2018-08-13 | End: 2018-08-15

## 2018-08-13 RX ORDER — POLYETHYLENE GLYCOL 3350 17 G/17G
17 POWDER, FOR SOLUTION ORAL EVERY 12 HOURS
Status: DISCONTINUED | OUTPATIENT
Start: 2018-08-13 | End: 2018-08-14

## 2018-08-13 RX ORDER — ALBUTEROL SULFATE 90 UG/1
2 AEROSOL, METERED RESPIRATORY (INHALATION) EVERY 4 HOURS PRN
COMMUNITY
End: 2019-07-08

## 2018-08-13 RX ORDER — HYDRALAZINE HYDROCHLORIDE 25 MG/1
75 TABLET, FILM COATED ORAL 4 TIMES DAILY
Status: ON HOLD | COMMUNITY
End: 2018-09-11

## 2018-08-13 RX ORDER — VANCOMYCIN HYDROCHLORIDE 50 MG/ML
125 KIT ORAL 4 TIMES DAILY
Status: DISPENSED | OUTPATIENT
Start: 2018-08-13 | End: 2018-09-06

## 2018-08-13 RX ORDER — CEFEPIME HYDROCHLORIDE 1 G/1
1 INJECTION, POWDER, FOR SOLUTION INTRAMUSCULAR; INTRAVENOUS EVERY 24 HOURS
Status: DISCONTINUED | OUTPATIENT
Start: 2018-08-13 | End: 2018-08-23

## 2018-08-13 RX ADMIN — NYSTATIN 1000000 UNITS: 100000 SUSPENSION ORAL at 00:58

## 2018-08-13 RX ADMIN — PREDNISONE 10 MG: 10 TABLET ORAL at 19:27

## 2018-08-13 RX ADMIN — CEFEPIME HYDROCHLORIDE 1 G: 1 INJECTION, POWDER, FOR SOLUTION INTRAMUSCULAR; INTRAVENOUS at 06:15

## 2018-08-13 RX ADMIN — VANCOMYCIN HYDROCHLORIDE 125 MG: KIT at 10:27

## 2018-08-13 RX ADMIN — ACETAMINOPHEN 1000 MG: 500 TABLET, FILM COATED ORAL at 19:32

## 2018-08-13 RX ADMIN — Medication 1 CAPSULE: at 08:23

## 2018-08-13 RX ADMIN — ACETAMINOPHEN 1000 MG: 500 TABLET, FILM COATED ORAL at 08:24

## 2018-08-13 RX ADMIN — NYSTATIN 1000000 UNITS: 100000 SUSPENSION ORAL at 16:07

## 2018-08-13 RX ADMIN — PREDNISONE 10 MG: 10 TABLET ORAL at 08:24

## 2018-08-13 RX ADMIN — MYCOPHENOLATE MOFETIL 500 MG: 250 CAPSULE ORAL at 08:23

## 2018-08-13 RX ADMIN — DEXTROSE MONOHYDRATE 25 ML: 500 INJECTION PARENTERAL at 07:13

## 2018-08-13 RX ADMIN — DEXTROSE MONOHYDRATE 50 ML: 500 INJECTION PARENTERAL at 12:16

## 2018-08-13 RX ADMIN — MYCOPHENOLATE MOFETIL 500 MG: 250 CAPSULE ORAL at 18:39

## 2018-08-13 RX ADMIN — FLUTICASONE PROPIONATE 2 SPRAY: 50 SPRAY, METERED NASAL at 08:24

## 2018-08-13 RX ADMIN — TACROLIMUS 2 MG: 1 CAPSULE ORAL at 18:39

## 2018-08-13 RX ADMIN — ACETAMINOPHEN 1000 MG: 500 TABLET, FILM COATED ORAL at 12:17

## 2018-08-13 RX ADMIN — VANCOMYCIN HYDROCHLORIDE 125 MG: KIT at 16:07

## 2018-08-13 RX ADMIN — TACROLIMUS 1 MG: 1 CAPSULE ORAL at 08:23

## 2018-08-13 RX ADMIN — VANCOMYCIN HYDROCHLORIDE 125 MG: KIT at 19:26

## 2018-08-13 RX ADMIN — TRAMADOL HYDROCHLORIDE 50 MG: 50 TABLET, COATED ORAL at 00:37

## 2018-08-13 RX ADMIN — Medication 0.4 MG: at 00:36

## 2018-08-13 RX ADMIN — TRIAMCINOLONE ACETONIDE: 1 OINTMENT TOPICAL at 08:24

## 2018-08-13 RX ADMIN — DEXTROSE MONOHYDRATE: 50 INJECTION, SOLUTION INTRAVENOUS at 08:43

## 2018-08-13 RX ADMIN — NYSTATIN 1000000 UNITS: 100000 SUSPENSION ORAL at 12:17

## 2018-08-13 RX ADMIN — Medication 0.5 MG: at 06:24

## 2018-08-13 RX ADMIN — Medication 5 MG: at 08:30

## 2018-08-13 RX ADMIN — Medication 5 MG: at 00:55

## 2018-08-13 RX ADMIN — METRONIDAZOLE 500 MG: 500 INJECTION, SOLUTION INTRAVENOUS at 08:30

## 2018-08-13 RX ADMIN — PANTOPRAZOLE SODIUM 40 MG: 40 TABLET, DELAYED RELEASE ORAL at 08:24

## 2018-08-13 RX ADMIN — NYSTATIN 1000000 UNITS: 100000 SUSPENSION ORAL at 19:26

## 2018-08-13 RX ADMIN — ROSUVASTATIN CALCIUM 20 MG: 20 TABLET, FILM COATED ORAL at 08:24

## 2018-08-13 RX ADMIN — TRIAMCINOLONE ACETONIDE: 1 OINTMENT TOPICAL at 19:27

## 2018-08-13 RX ADMIN — Medication 0.5 MG: at 02:21

## 2018-08-13 RX ADMIN — NYSTATIN 1000000 UNITS: 100000 SUSPENSION ORAL at 08:23

## 2018-08-13 RX ADMIN — ACETAMINOPHEN 1000 MG: 500 TABLET, FILM COATED ORAL at 16:09

## 2018-08-13 RX ADMIN — GANCICLOVIR SODIUM 100 MG: 500 INJECTION, POWDER, LYOPHILIZED, FOR SOLUTION INTRAVENOUS at 19:24

## 2018-08-13 RX ADMIN — METRONIDAZOLE 500 MG: 500 INJECTION, SOLUTION INTRAVENOUS at 01:04

## 2018-08-13 RX ADMIN — ACETAMINOPHEN 1000 MG: 500 TABLET, FILM COATED ORAL at 00:36

## 2018-08-13 RX ADMIN — Medication 250 MG: at 19:26

## 2018-08-13 RX ADMIN — CEFEPIME HYDROCHLORIDE 1 G: 1 INJECTION, POWDER, FOR SOLUTION INTRAMUSCULAR; INTRAVENOUS at 21:47

## 2018-08-13 RX ADMIN — DEXTROSE MONOHYDRATE 25 ML: 500 INJECTION PARENTERAL at 06:24

## 2018-08-13 RX ADMIN — DEXTROSE MONOHYDRATE: 100 INJECTION, SOLUTION INTRAVENOUS at 12:50

## 2018-08-13 RX ADMIN — VANCOMYCIN HYDROCHLORIDE 125 MG: KIT at 12:17

## 2018-08-13 RX ADMIN — METRONIDAZOLE 500 MG: 500 INJECTION, SOLUTION INTRAVENOUS at 16:01

## 2018-08-13 ASSESSMENT — ACTIVITIES OF DAILY LIVING (ADL)
ADLS_ACUITY_SCORE: 11
ADLS_ACUITY_SCORE: 12
ADLS_ACUITY_SCORE: 11

## 2018-08-13 ASSESSMENT — PAIN DESCRIPTION - DESCRIPTORS
DESCRIPTORS: ACHING

## 2018-08-13 NOTE — PHARMACY-ADMISSION MEDICATION HISTORY
Admission medication history interview status for the 8/12/2018 admission is complete. See Epic admission navigator for allergy information, pharmacy, prior to admission medications and immunization status.     Medication history interview sources:  Patient    Changes made to PTA medication list (reason)  Added: none  Deleted: none  Changed:     Hydralazine: Take 50 mg by mouth four times daily --> Take 75 mg by mouth four times daily [per patient]    Albuterol Inhaler: Inhale 6 puffs into the lungs every 4 hours as needed --> Inhale 2 puffs into the lungs every 4 hours as needed    Additional medication history information (including reliability of information, actions taken by pharmacist):    The patient seems like a reliable historian     He stated that his last home doses of his medications were last Wednesday (8/8/18)    The patient will start pravastatin after finishing up current rosuvastatin prescription due to a cost concern    Prior to Admission medications    Medication Sig Last Dose Taking? Auth Provider   acetaminophen (TYLENOL) 325 MG tablet Take 2 tablets (650 mg) by mouth every 4 hours as needed for mild pain (multimodal surgical pain management along with NSAIDS and opioid medication as indicated based on pain control and physical function.) 8/8/2018 at Unknown time Yes Mary Alice Andre APRN CNP   albuterol (PROAIR HFA/PROVENTIL HFA/VENTOLIN HFA) 108 (90 Base) MCG/ACT inhaler Inhale 2 puffs into the lungs every 4 hours as needed for shortness of breath / dyspnea or wheezing 8/8/2018 at Unknown time Yes Unknown, Entered By History   apixaban ANTICOAGULANT (ELIQUIS) 2.5 MG tablet Take 1 tablet (2.5 mg) by mouth 2 times daily 8/9/2018 at Unknown time Yes Mary Alice Andre APRN CNP   ASPIRIN 81 MG OR TABS Take 1 tablet (81 mg) by mouth at bedtime 8/8/2018 at Unknown time Yes Reported, Patient   biotin (BIOTIN 5000) 5 MG CAPS Take 5 mg by mouth daily 8/8/2018 at Unknown time Yes Jennifer Reid PA-C    cloNIDine (CATAPRES) 0.1 MG tablet Take 1 tablet (0.1 mg) by mouth At Bedtime 8/8/2018 at Unknown time Yes Jennifer Dean APRN CNP   fluticasone (FLONASE) 50 MCG/ACT spray Spray 1-2 sprays into both nostrils daily 8/8/2018 at Unknown time Yes Klever Ernst MD   hydrALAZINE (APRESOLINE) 25 MG tablet Take 75 mg by mouth 4 times daily  8/8/2018 at Unknown time Yes Unknown, Entered By History   insulin isophane human (HUMULIN N PEN) 100 UNIT/ML injection 30 units in AM and 16 units in the evening. Please check blood sugar four times daily. 8/8/2018 at Unknown time Yes Jennifer Dean APRN CNP   lisinopril (PRINIVIL/ZESTRIL) 20 MG tablet Take 1 tablet (20 mg) by mouth daily 8/8/2018 at Unknown time Yes Jennifer Dean APRN CNP   loratadine (CLARITIN) 10 MG tablet Take 10 mg by mouth daily Reported on 5/3/2017 8/8/2018 at Unknown time Yes Reported, Patient   melatonin 1 MG TABS tablet Take 2 tablets (2 mg) by mouth nightly as needed for sleep Past Month at Unknown time Yes Mellisa Suh APRN CNP   mycophenolate (GENERIC EQUIVALENT) 250 MG capsule Take 2 capsules (500 mg) by mouth 2 times daily 8/8/2018 at Unknown time Yes Jennifer Dean APRN CNP   NEPHROCAPS 1 MG capsule Take 1 capsule by mouth daily 8/8/2018 at Unknown time Yes Mary Alice Andre APRN CNP   nystatin (MYCOSTATIN) 928077 UNIT/ML suspension Swish and swallow 10 mLs (1,000,000 Units) in mouth 4 times daily 8/8/2018 at Unknown time Yes Mary Alice Andre APRN CNP   pantoprazole (PROTONIX) 40 MG EC tablet Take 1 tablet (40 mg) by mouth every morning 8/8/2018 at Unknown time Yes Klever Ernst MD   predniSONE (DELTASONE) 5 MG tablet Take 3 tablets (15 mg) by mouth 2 times daily 8/8/2018 at Unknown time Yes Jennifer Dean APRN CNP   rosuvastatin (CRESTOR) 20 MG tablet Take 1 tablet (20 mg) by mouth daily 8/8/2018 at Unknown time Yes Klever Ernst MD   tacrolimus (GENERIC EQUIVALENT) 1 MG capsule 1 mg in AM and 2 mg at  supper. Repeat level Wednesday. 8/8/2018 at Unknown time Yes Jennifer Dean APRN CNP   traMADol (ULTRAM) 50 MG tablet Take 1 tablet (50 mg) by mouth every 6 hours as needed for moderate pain 8/8/2018 at Unknown time Yes Mary Alice Andre APRN CNP   triamcinolone (KENALOG) 0.1 % ointment Apply topically 2 times daily 8/8/2018 at Unknown time Yes Jennifer Reid, SPENCER   blood glucose monitoring (ACCU-CHEK FASTCLIX) lancets Use to test blood sugar 2-3 times daily or as directed.  Ok to substitute alternative if insurance prefers.   Yahir Turcios MD   blood glucose monitoring (NO BRAND SPECIFIED) test strip Use to test blood sugar 2-3 times daily or as directed.   Mellisa Suh APRN CNP   order for DME Equipment being ordered: [employer] ()  Treatment Diagnosis: ESRD on PD  Pt has to be connected to PD all night and can not be disconnected, hence impending his mobility to go to the bathroom. At risk for infection if he does not have this equipment.  Patient not taking: Reported on 7/26/2018   Breanne Miller MD   pravastatin (PRAVACHOL) 40 MG tablet Take 1 tablet (40 mg) by mouth daily DC after current prescription completed; replace with rosuvastatin. Has not started  Klever Ernst MD           Medication history completed by:Arlen Strange, PharmD Student

## 2018-08-13 NOTE — NURSING NOTE
"Pt seen in clinic with LEWIS Suh for 8 week follow up post heart transplant. NP reviewed meds, labs, and VS journal. BP stable on current regimen. Pt reports two temps >100. Remains off bactrim due to low WBC; pentamidine 7/20/18 and every 28 days due 8/17. Pt talkative and much more like himself this visit. Denies n/v/d. Occas HAs. Mouth lesion improving. Pt is \"busy\" with appts and has not started cardiac rehab; understands that he will need to complete in the future. NP discussed temps with Dr Blum - will have cultures today, one more dose of Cefepine at dialysis on Sat. Called to Nohemi Hurtado RN at [facility]. Reassess on Monday 8/13. AVS reviewed and provided to pt. Emphasized to call if temps rise about 99. Ensured pt knew how to reach OC coordinator over weekend.    ~Please call your coordinator at [phone] with any questions or concerns.    ~Coordinator will call with biopsy results.  ~Call if you have ANY fever greater than 99, low BP or decline in overall health.  ~Coordinator will call with date/time of Pentamidine treatment.   ~Return 8/24/18 for next biopsy.   ~Coordinator will contact dialysis center about continuing antibiotics.   ~Cultures today.        "

## 2018-08-13 NOTE — PLAN OF CARE
Problem: Patient Care Overview  Goal: Plan of Care/Patient Progress Review  Outcome: No Change  1930-2330: Pt was in OR for a rectal abscess I and D this 4 hr shift until 2250 when he arrived back from PACU. Is a/o x 4. Pt stood to transfer into bed. States rectum is sore. CAPNO on. Rectal dressing covered with foam tape and appears intact. No bleeding noted. Has PIV x 2 and R CVC dialysis catheter. Enteric precautions continue. Will continue to monitor pt.

## 2018-08-13 NOTE — ANESTHESIA CARE TRANSFER NOTE
Patient: Murray Nicholson    Procedure(s):  EXAM UNDER ANESTHESIA RECTUM ,COMBINED INCISION AND DRAINAGE OF RECTAL ABCESS  - Wound Class: IV-Dirty or Infected   - Wound Class: IV-Dirty or Infected    Diagnosis: Anal Rectal Abcess  Diagnosis Additional Information: No value filed.    Anesthesia Type:   General, ETT     Note:  Airway :Room Air  Patient transferred to:Phase II  Comments: Pt transferred to PACU.  VSS. Report to RN.  All questions answered. Handoff Report: Identifed the Patient, Identified the Reponsible Provider, Reviewed the pertinent medical history, Discussed the surgical course, Reviewed Intra-OP anesthesia mangement and issues during anesthesia, Set expectations for post-procedure period and Allowed opportunity for questions and acknowledgement of understanding      Vitals: (Last set prior to Anesthesia Care Transfer)    CRNA VITALS  8/12/2018 2022 - 8/12/2018 2122 8/12/2018             Resp Rate (observed): (!)  3                Electronically Signed By: VERONICA Yee CRNA  August 12, 2018  9:22 PM

## 2018-08-13 NOTE — PROGRESS NOTES
Crosscover note    Pt's Cdiff stool PCR resulted positive. Pt is already on metronidazole IV 500mg q8hr. Will not add or change to vancomycin for now. Await ID recs in the AM.

## 2018-08-13 NOTE — PROGRESS NOTES
[facility]  Transplant Infectious Disease Progress Note     Patient:  Murray Nicholson, Date of birth 1955, Medical record number [id number]  Date of Visit:  2018         Assessment and Recommendations:   Recommendations:  - Continue cefepime and metronidazole for empiric coverage of enteric organisms and Pseudomonas, which had grown from blood cultures previously.  - Agree with PO vancomycin for C difficile  - Recommend starting induction therapy dosing for ganciclovir for possible CMV disease with monitoring of CBC diff while on therapy.  - Once patient is off cefepime therapy, we would recommend checking surveillance blood cultures 1 and 2 weeks after cessation of therapy to document clearance of bacteremia.  - Awaiting call back on documentation of prior positive blood cultures from [facility].  - For PJP prophylaxis, we recommend resuming bactrim regimen 1 SS tab three times weekly (HD dosing) since leukopenia is resolved.  - For small pulmonary nodules, we would recommend sooner interval imaging in transplant patient with repeat CT in 4-6 weeks.  - Consider repeat abdominal imaging prior to any surgical interventions     Assessment:  Mr. Nicholson is a 63-year-old man with history of ischemic/valvular cardiomyopathy s/p OHT on 18 (induction with solumedrol and maintenance with tacrolimus, MMF, and prednisone), ESRD on TTa hemodialysis currently listed for kidney transplantation, history of polymicrobial peritonitis with Candida glabrata when previously on peritoneal dialysis in 2018, recent Pseudomonas aeruginosa bacteremia on 18 with retained dialysis catheter, hypertension, hyperlipidemia, and type 2 diabetes who was admitted on  with subjective fevers, severe lower abdominal pain, and bloody stools.  Now C diff positive.     ID issues include the followin. C difficile colitis with 7.8 cm daniella-colonic and smaller 2.7 cm perirectal abscesses: During his prior  admission, he had imaging findings suggestive of early colitis and more recent imaging from 8/12 suggesting progression of this now with development of abscesses.  Throughout this time period, he had been on broad-spectrum coverage with cefepime which should suppress many enteric organisms though with holes in anaerobic coverage that could explain progression of symptoms.  He is now also C diff positive.  Due to considering of many factors including that CMV colitis can occur without viremia, the fact that he is high-risk due to CMV recipient negative serostatus and the potential adverse effects of selecting for resistant CMV with lower levels of prophylactic dosing of ganciclovir, we would favor starting induction therapy dosing for ganciclovir (HD dosing).  With respect to abscesses, he is s/p I&D of smaller daniella-rectal abscess on 8/12.  Ongoing discussions regarding need for operative management vs. alternative drainage of daniella-colonic abscess but holding off currently due to apixaban use prior to admission.     2.  Pseudomonas aeruginosa bacteremia associated with a hemodialysis catheter that was retained: History of positive blood cultures from 7/26 but by report, there have not been more recent positive cultures since then.  There is certainly a risk that he may have recrudescence of bacteremia as result of biofilm formation on the line that was retained.  However, at present, the cefepime would be suppressing growth as he has not been off antibiotics for any period of time.  He has not had evidence of breakthrough bacteremia as assessed by blood cultures from 8/10 as well as more recent blood cultures drawn during this admission.  Because of his intra-abdominal abscesses, we expect that he will continue to be on an agent active against Pseudomonas for some time.  Due to risk of colonization of the line, we would recommend surveillance blood cultures in the future with timing to be determined based on his  final duration of antibiotic therapy but anticipated to be 1 and 2 weeks after cessation of any antibiotics active against Pseudomonas.     3. New small (3mm and 5mm) pulmonary nodules: Would repeat CT chest in 4-6 weeks.  4. Oral ulcer: Viral testing during last admission was negative.  Wound culture grew Pseudomonas in addition to oral tiffany.  Have not seen previously but improving by patient report.     Other:  - QTc interval: 451ms 7/26/18  - Viral serostatus & prophylaxis: HSV1-, HSV2+, CMV D+/R-, EBV D?/R+, VZV+.  Valganciclovir held since 7/20 due to neutropenia.  - PJP: Pentamidine but favor switch back to bactrim  - Isolation status: enteric     Staffed with Dr. Lawrence.  Patient will continue to be followed by Dr. Lawrence and myself on the HSCT transplant service for continuity of care.     Recommendations discussed with primary cards2 team.     Juliana Alexander MD, PhD  Adult & Pediatric Infectious Diseases Fellow PGY7, [facility]  Pager: [phone]        Interval History:   Afebrile since admission.  Feeling somewhat better today.  Had I&D of daniella-rectal abscess overnight.  Rectal area is still sore.  Currently NPO but not particularly hungry.  One stool last night.  Respiratory status stable on room air with no cough.  Planning for next dialysis run tomorrow.    Anti-infectives:  Current  Cefepime 7/26-present  IV metronidazole 8/12-present  PO vancomycin 8/13-present    Transplants:  6/14/2018 (Heart), Postoperative day:  60.  Coordinator Britni Mcknight    Immunosuppression: Mycophenolate 500 mg twice daily, tacrolimus 1 mg every morning and 2 mg every afternoon prior trough goal had been 10-12 but currently targeting 8.  Prednisone 15 mg twice daily           Current Medications & Allergies:       acetaminophen  1,000 mg Oral 4x Daily     biotin  5 mg Oral Daily     ceFEPIme (MAXIPIME) IV  1 g Intravenous Q24H     fluticasone  1-2 spray Both Nostrils Daily     insulin aspart  1-6 Units  "Subcutaneous Q4H     metroNIDAZOLE  500 mg Intravenous Q8H     mycophenolate  500 mg Oral BID IS     nystatin  1,000,000 Units Swish & Swallow 4x Daily     pantoprazole  40 mg Oral QAM     predniSONE  10 mg Oral BID     rosuvastatin  20 mg Oral Daily     saccharomyces boulardii  250 mg Oral BID     sodium chloride (PF)  3 mL Intracatheter Q8H     tacrolimus  1 mg Oral QAM     tacrolimus  2 mg Oral QPM     triamcinolone   Topical BID     vancomycin  125 mg Oral 4x Daily       Infusions/Drips:    dextrose 10 mL/hr at 08/13/18 1250     lactated ringers       - MEDICATION INSTRUCTIONS -       Patient RECEIVING antibiotic to treat a different condition and it provides ADEQUATE COVERAGE for this surgical procedure.         Allergies   Allergen Reactions     Norco [Hydrocodone-Acetaminophen] Nausea and Vomiting     Cats      Throat tightness     Isosorbide Other (See Comments)     hypotension     Penicillins Hives     Seasonal Allergies      rhinitis     Shrimp      Throat closes             Physical Exam:   Vitals were reviewed.    BP (!) 160/101  Pulse 80  Temp 98.2  F (36.8  C) (Oral)  Resp 16  Ht 1.753 m (5' 9\")  Wt 75 kg (165 lb 5.5 oz)  SpO2 100%  BMI 24.42 kg/m2  Physical Examination:  GENERAL:  well-developed, well-nourished, good historian  HEENT:  Head is normocephalic, atraumatic   EYES:  Eyes have anicteric sclerae without conjunctival injection    ENT:  Oropharynx is moist.  Tan ulcer on the anterior hard palate.  See cards note from today for photo.  NECK:  Supple. No cervical lymphadenopathy  LUNGS:  Clear to auscultation bilateral.   CARDIOVASCULAR:  Regular rate and rhythm with no murmurs.  Well-healed surgical scars.    ABDOMEN:  Active bowel sounds.  Mild-moderate tenderness over lower abdomen and along left flank.  SKIN:  No acute rashes.  NEUROLOGIC:  Grossly nonfocal. Active x4 extremities  T/L/D: Tunneled dialysis catheter right chest.  No erythema or tenderness over tunnel.         Laboratory " Data:   Creatinine 5.72  BUN 24  K3.8  Bicarb 24     CRP 76  Procalcitonin 33.03     WBC 5.2  ANC [place] of 0.3 on 18; ANC consistently in normal range since 18 and currently 3.7  ALC 0.7  Hemoglobin 8.6  Platelets 117     Tacro trough 8.4 on .  Had supratherapeutic levels up to 24.7 on 18.     Microbiology   Blood                         18 dialysis catheter tip 2 strains CoNS                         7/26/18 x2 from [facility] Pseudomonas aeruginosa (by notes but no micro report yet reviewed)                         7/26/18 x2 [facility] negative                         7/28/18 x2 negative                         8/10/18 ×2 NGTD                         18 ×3 NGTD     Wound                         18: Mouth wound with pansensitive pseudomonas aeruginosa and normal tiffany                         18 Mouth ulcer     Peritoneal fluid                         18 13 yellow fluid with 4736 WBCs, 80% neutrophils.  Gram stain with no organisms and many WBCs.  Culture with Candida glabrata and Bacteroides caccae.                         1/15/18: Peritoneal fluid with 4256 WBCs, 91% neutrophils.  Gram stain with many WBCs and no organisms seen.  Culture with Staphylococcus epidermidis, Candida glabrata    Enteric  Enteric LOGAN   18 negative  Giardia ag   18 negative  C diff   18 positive  Cryptosporidium ag   18 pending  Microsporidium ag   18 pending     Virology  CMV blood PCR                         18 negative                         18 negative  EBV blood PCR                         18 negative  Parvovirus blood PCR                          18 negative  VZV blood PCR                         18 negative     Imagin/26/18: CT CAP:  1. Findings suggestive of mild infectious or inflammatory colitis of the ascending colon just proximal to the cecum. No pericolonic fluid collection.  2. Postoperative changes of cardiac transplant with no  significant change in loculated retrosternal fluid and soft tissue stranding compared to 6/26/2018.  3. 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        8/12/18 CT abd/pelv:  1 a. Inflamed segment of distal sigmoid colon in the setting of diverticulosis, most likely diverticulitis. This is complicated by a pericolonic abscess measuring up to 7.8 cm, which abuts multiple loops of small bowel.   1 b. Inflamed loop of proximal sigmoid colon, most compatible with uncomplicated diverticulitis.  1 c. Inflammation of the right colon compatible with colitis, typhlitis is a consideration setting of neutropenia.  1 e. Unchanged inflammation of the proximal duodenum, with a chronic appearance. Peptic ulcer disease is a consideration.  2. Small perirectal abscess, measuring up to 2.7 cm.  3. New right lower lobe pulmonary nodules measuring 5 and 3 mm, respectively. Recommend short-term follow-up CT chest in 3 months.  4. Stable size of multiple stable pancreatic IPMNs.

## 2018-08-13 NOTE — PROGRESS NOTES
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 Last HD 8/11/18.       ASSESSMENT AND RECOMMENDATIONS:      1. ESRD - Etiology of ESRD 2/2 HTN, DM, CRS.   Has chronic OP HD at [facility], TTS schedule, under care of Dr Mcpherson.    - Dialysis orders: Right TDC, EDW 76 kg, Run time 4 hrs   - Will continue TTS schedule while inpatient     2. Volume status - Euvolemic. No edema/dyspnea. /. No urine recorded. Weight 75 kg   - EDW 76 kg   - Continue pre run weights   - I/O     3. HTN - Well controlled. /. OP regimen:  Clonidine 0.1 mg HS, Hydralazine 50 mg QID, Lisinopril 20 mg every day   - All currently on hold     4. Transplant IS regimen: Tac, MMF, Pred      5. Electrolytes - No acute concerns. K 3.8, Na 135     6. Acid base - No acute concerns. Bicarb 24     7. BMD - corrected Ca 9.6, Mg 2.5, Phos 2.6, albumin 1.6   - Vit D/PTH being managed in OP HD unit     8. Anemia - Hgb 8.6. On Epo 7600 q run   - Will confirm with HD unit tomorrow     9. Abdominal pain/bloody stool - Abd Ct showed pericolonic abscess 7.8 cm, small perircal abscess 2.7 cm. Colorectal surgery managing. Went to OR 8/13 for I/D anorectal abscess. IR unable to place drain in pericolonic abscess. Will likely require sigmoidectomy with end colostomy, but patient hoping that Abx and bowel rest may help him avoid the surgery. Also has C diff. Current abx: Po Vanco, Flagyl and Maxipime.    - BC neg, WBC 5.2, afebrile     10. Lung nodules - New Right lower lobe pulmonary nodules seen on CT this admission. ID recommending close f/u.      Recommendations were  "communicated to primary team via progress note     Patricia Cook, NP   [phone]    Interval History :   Patient underwent surgical I/D anorectal abscess early this morning and he is feeling improved  Found also to have C diff infection, stools have decreased  Remains NPO  Interval progress notes, imaging studies and labs reviewed    Review of Systems:   I reviewed the following systems:  GI: NPO  Neuro:  no confusion  Constitutional:  no fever or chills  CV: denies dyspnea, CP or edema.   : Minimal urine    Physical Exam:   I/O last 3 completed shifts:  In: 568 [P.O.:60; I.V.:508]  Out: 30 [Blood:30]   BP (!) 160/101  Pulse 80  Temp 98.2  F (36.8  C) (Oral)  Resp 16  Ht 1.753 m (5' 9\")  Wt 75 kg (165 lb 5.5 oz)  SpO2 100%  BMI 24.42 kg/m2     GENERAL APPEARANCE: comfortable  EYES: no scleral icterus, pupils equal  Pulmonary: lungs CTA. Breathing is non labored.   CV: RRR   - Edema - none  GI: soft, non distended, mild tenderness  MS: no evidence of inflammation in joints, no muscle tenderness  : no doyle  SKIN: no rash, warm, dry, no cyanosis  NEURO: alert/oriented  ACCESS: Right TDC    Labs:   All labs reviewed by me  Electrolytes/Renal -   Recent Labs   Lab Test  08/13/18   0618  08/13/18   0121  08/12/18   1253  08/12/18   0834   08/10/18   1104  08/08/18   0921   NA  135  136  136  138   < >  135  132*   POTASSIUM  3.8  3.9  3.8  3.5   < >  4.8  5.1   CHLORIDE  103  101  100  101   < >  102  99   CO2  24  23  25  25   < >  24  22   BUN  38*  34*  26  24   < >  32*  38*   CR  5.72*  5.27*  4.45*  4.01*   < >  4.87*  5.01*   GLC  40*  81  131*  108*   < >  220*  134*   TRINI  7.9*  7.8*  7.5*  7.4*   < >  7.7*  8.3*   MAG  2.5*  2.5*   --   1.4*   --   1.6  1.7   PHOS   --    --    --   2.6   --   2.8  3.1    < > = values in this interval not displayed.       CBC -   Recent Labs   Lab Test  08/13/18   0618  08/13/18   0121  08/12/18   1253   08/12/18   0834   WBC  5.2   --   4.8   --   4.9   HGB  " 8.6*  9.0*  7.5*   < >  7.4*   PLT  117*   --   111*   --   131*    < > = values in this interval not displayed.       LFTs -   Recent Labs   Lab Test  08/13/18   0618  08/08/18   0921  07/29/18   0523  07/26/18   1732   ALKPHOS  99  156*  106  95   BILITOTAL  0.3  0.4  0.4  0.7   ALT  17  22  21  19   AST  28  34  26  18   PROTTOTAL  5.2*   --   5.2*  6.1*   ALBUMIN  1.6*   --   1.9*  2.3*       Iron Panel -   Recent Labs   Lab Test  07/10/18   0711  05/08/18   0954  07/19/17   1306   IRON  66  58  46   IRONSAT  28  27  18   ALBERTINA  771*  621*  369         Current Medications:    acetaminophen  1,000 mg Oral 4x Daily     biotin  5 mg Oral Daily     ceFEPIme (MAXIPIME) IV  1 g Intravenous Q24H     fluticasone  1-2 spray Both Nostrils Daily     insulin aspart  1-6 Units Subcutaneous Q4H     metroNIDAZOLE  500 mg Intravenous Q8H     mycophenolate  500 mg Oral BID IS     nystatin  1,000,000 Units Swish & Swallow 4x Daily     pantoprazole  40 mg Oral QAM     predniSONE  10 mg Oral BID     rosuvastatin  20 mg Oral Daily     saccharomyces boulardii  250 mg Oral BID     sodium chloride (PF)  3 mL Intracatheter Q8H     tacrolimus  1 mg Oral QAM     tacrolimus  2 mg Oral QPM     triamcinolone   Topical BID     vancomycin  125 mg Oral 4x Daily       dextrose 10 mL/hr at 08/13/18 1250     lactated ringers       - MEDICATION INSTRUCTIONS -       Patient RECEIVING antibiotic to treat a different condition and it provides ADEQUATE COVERAGE for this surgical procedure.       Patricia Cook, NP

## 2018-08-13 NOTE — PLAN OF CARE
"Problem: Patient Care Overview  Goal: Individualization & Mutuality    D: admitted 8/12: now s/p I&D of rectal abscess. Arrival on 6C last evening shortly before 2300 shift change.   A/I:   Drowsy last evening but rousable to voice. Irritable, particular, at times uncooperative with an occasional outburst where pt verbalizes requests in an aggressive manner and dictates care.   This behavior was more pronounced during hypoglycemia this morning. Found in low 30s x2 using blood drop from lab's venipuncture collection. Appeared tremulous. Recheck on fingertip=28. Given 25mL D50% (alert & NPO). Improved to 91 but quickly dropped back down to 67 at 0715 (handoff given to oncoming RN at 0730 when recheck due). Last evening BGs 80 & 81; c/o mild headache & was slightly irritable but otherwise asymptomatic. Given scheduled tylenol along with dilaudid & tramadol for surgical site pain at \"buttocks,\" per pt. Also given hot packs for headache. Afterward slept majority of night. Reports slight headache this morning.     VSS. BPs 130/80s. Afebrile. SR 80s. Room air with nasal cannula present for capnography monitoring.   PIV x2. Right chest HD access.   Abx given as scheduled.   Buttocks dressing CDI.   NPO ex Meds.     P: continue to monitor; continue to closely monitor hypoglycemia tendencies.       "

## 2018-08-13 NOTE — TELEPHONE ENCOUNTER
"Pt called at 1:30 AM Sunday to report several temps >100F and blood in stools, also stated he was too weak to move. States his heart feels \"fine\". He went to dialysis on Sat, received antibiotic as planned and over the course of the day became febrile. Pt states he had no one to bring him to the ER. Instructed pt to hang up and call 911 and have ambulance bring him to the ER. Pt agreed with plan.    Called ER with impending admit; RN would call writer if pt not there within 30 minutes.  Writer called back at 4AM and confirmed pt was brought to the ER via ambulance.   "

## 2018-08-13 NOTE — PROGRESS NOTES
Colorectal Surgery Progress Note  POD#1    Subjective:  Afebrile. Hypoglycemic events overnight as low as 20s improved with intervention. Hypertensive SBP 170s. Gluteal post-op pain controlled with Tramadol and dilaudid.  Still has some LLQ abdominal pain. Cdiff positive overnight.    Vitals:  Vitals:    08/12/18 2250 08/13/18 0043 08/13/18 0214 08/13/18 0610   BP: (!) 175/113 (!) 149/105 137/88 138/88   BP Location:  Left arm Left arm Left arm   Pulse:   80    Resp:  16 14 16   Temp:  97.9  F (36.6  C)  98.3  F (36.8  C)   TempSrc:  Oral  Oral   SpO2:  100% 100% 100%   Weight:       Height:           I/O:  I/O last 3 completed shifts:  In: 568 [P.O.:60; I.V.:508]  Out: 30 [Blood:30]    Physical Exam:  Gen: AAOx3, NAD  Pulm: Non-labored breathing on room air.  Abd: Soft, non-distended, LLQ tenderness, no rebound/guarding   Perianal rectal abscess with clean base, no bleeding or significant drainage.   Ext:  Warm and well-perfused    BMP  Recent Labs  Lab 08/13/18  0618 08/13/18  0121 08/12/18  1253 08/12/18  0834  08/10/18  1104 08/08/18  0921    136 136 138  < > 135 132*   POTASSIUM 3.8 3.9 3.8 3.5  < > 4.8 5.1   CHLORIDE 103 101 100 101  < > 102 99   CO2 24 23 25 25  < > 24 22   BUN 38* 34* 26 24  < > 32* 38*   CR 5.72* 5.27* 4.45* 4.01*  < > 4.87* 5.01*   GLC 40* 81 131* 108*  < > 220* 134*   MAG 2.5* 2.5*  --  1.4*  --  1.6 1.7   PHOS  --   --   --  2.6  --  2.8 3.1   < > = values in this interval not displayed.  CBC  Recent Labs  Lab 08/13/18  0618 08/13/18  0121 08/12/18  1253 08/12/18  1010 08/12/18  0834 08/12/18  0230   WBC 5.2  --  4.8  --  4.9 4.7   HGB 8.6* 9.0* 7.5* 7.0* 7.4* 8.2*   HCT 27.0*  --  23.7*  --  23.2* 25.8*   *  --  111*  --  131* 136*           ASSESSMENT:   63 year old male h/o heart TXP 6/2018 on immunosuppressants, ESRD on HD, R IJ thrombus on apixabam (held, last dose 2d ago), T2DM, ongoing pseudomonal bacteremia p/w abd pain & BRBPR w/first episode of diverticulitis c/b  paracolonic abscess seen on CT. CT reviewed, LLQ 8-cm paracolonic abscess. Appears to be surrounded by SB, and may have a component of intramural abscess with stool in its interior. Sigmoid appears to be moderately inflamed, but there is some colon wall inflammation involving the ascending colon as well. Co-incidentally there is a right-sided anorectal abscess as well. His anorectal exam confirmed this. Most likely diverticulitis but may also be perforated stercoral ulcer, CMV colitis, and ischemic colitis.     - IR for percutanous drainage of abscess with prolong IV abx vs sigmoidectomy +/- stoma  - s/p perirectal I&D on 8/12  - Agree with IV Flagyl for paracolonic abscess  - Agree with PO Vanco for Cdiff  - NPO & hold any anticoags in case of procedure    Appreciate cares by primary team. Colorectal will continue to follow.    Jackie Harper,DO  General Surgery PGY-1  Colon and Rectal Surgery Service  [phone]    Patient was seen with team and discussed with staff

## 2018-08-13 NOTE — ANESTHESIA POSTPROCEDURE EVALUATION
Patient: Murray Nicholson    Procedure(s):  EXAM UNDER ANESTHESIA RECTUM ,COMBINED INCISION AND DRAINAGE OF RECTAL ABCESS  - Wound Class: IV-Dirty or Infected   - Wound Class: IV-Dirty or Infected    Diagnosis:Anal Rectal Abcess  Diagnosis Additional Information: No value filed.    Anesthesia Type:  General, ETT    Note:  Anesthesia Post Evaluation    Patient location during evaluation: PACU  Patient participation: Able to fully participate in evaluation  Level of consciousness: awake and alert  Pain management: adequate  Airway patency: patent  Cardiovascular status: acceptable  Respiratory status: acceptable  Hydration status: acceptable  PONV: none             Last vitals:  Vitals:    08/12/18 2100 08/12/18 2115 08/12/18 2130   BP: (!) 156/101 (!) 160/103 (!) 138/103   Pulse:      Resp: 18 16    Temp:  37.1  C (98.7  F)    SpO2: 100% 100%          Electronically Signed By: Angelito Erwin MD  August 12, 2018  9:51 PM

## 2018-08-13 NOTE — PROGRESS NOTES
CLINICAL NUTRITION SERVICES - ASSESSMENT NOTE     Nutrition Prescription    RECOMMENDATIONS FOR MDs/PROVIDERS TO ORDER:  1. If lytes (Phos, Mg++, and K+) remain stable and glycemic control acceptable, increase dextrose by 40 g per day until goal dextrose of 260 g/day is reached.   2. Adjust IVF if needed. Rec minimize D5 IVFs with parenteral nutrition (PN).    Malnutrition Status:    Non-severe malnutrition in the context of chronic illness    Recommendations already ordered by Registered Dietitian (RD):  Placed PN change order.    Future/Additional Recommendations:  1. Continue NPO status as per team.  2. Monitor BG control. DM II hx, Endo has followed in the past. Low BG this admission, at times. Hgb A1c of 7 on both 7/18/18 and 4/26/18.   3. Assess if pt needs calcium/vitamin D, if remains on prednisone. Per nephrology post-op Tx, pt was getting calcium during dialysis and calcitriol during runs.   4. Check vitamin D status. Pt's 25 [place] vitamin D lab was 23 on 4/11/17. Rec supplement with additional vitamin D if low.   5. Consider checking vitamin B12 status.     REASON FOR ASSESSMENT  Murray Nicholson is a/an 63 year old male assessed by the dietitian for Pharmacy/Nutrition to Start and Manage PN    NUTRITION HISTORY  Pt required TFs for a short time post-op transplant (6/15-6/16). He was assessed by RD on 8/2/18. Per RD note, pt was on a regular diet, tolerating carb controlled diet, and eating well (100% of meals consumed).  Per H & P, pt was admitted with fevers and bloody BM (found to have pericolonic abscess, colitis, and perianal abscess). Note, h/o heart Tx 6/14/18 and ESRD on HD. On 8/11, pt developed crampy abdominal pain in a belt-like distrubution in the lower abdomen. Chart indicates pt was ordered to take, noting, nephrocaps, biotin, and insulin PTA.    Per pt, little nutrition intake over the past four days (since 8/9). Lack of appetite. States his main symptom prohibiting oral intake was abdominal  "pain. Pt confirmed he has a shrimp allergy and does not admit to having any other food allergies.      CURRENT NUTRITION ORDERS  Diet: NPO since 8/12 (briefly had a regular diet order 8/12)  Intake/Tolerance: States he had a small amount of peaches and Isabel Doones in the ED and about 50% of his oatmeal and toast on 8/12.    LABS  Labs reviewed    MEDICATIONS  Medications reviewed  IVFs: D5 at 10 mL/hr. This provides 41 kcals/day.     ANTHROPOMETRICS  Height: 175.3 cm (5' 9\")  Most Recent Weight: 75 kg (165 lb 5.5 oz)    IBW: 72.7 kg   BMI: Normal BMI  Weight History: 89.4 kg (8/18/17), 80.2 kg (1/20/18), 79.9 kg (7/10/18) - Pt has lost 6% of his body wt over the past month. Difficult to assess wt changes with changes in fluid status and pt on dialysis. However, some actual wt loss as well due to inadequate nutrition intake PTA.   Dosing Weight: 75 kg (based on lowest wt this admission of 75 kg on 8/12)    ASSESSED NUTRITION NEEDS  Estimated Energy Needs: 7650-4026 kcals/day (25 - 30 kcals/kg)  Justification: Maintenance needs or per team, pending fluid status  Estimated Protein Needs:  grams protein/day (1.2 - 1.8 grams of pro/kg)  Justification: Increased needs with stress factors, dialysis, and upcoming surgery  Estimated Fluid Needs: 6173-3248 mL/day (25 - 30 mL/kg)   Justification: Maintenance needs or per team, pending fluid status    PHYSICAL FINDINGS  See malnutrition section below.  GI: Per CT abdomen/pelvis on 8/12, multiple inflamed loops of bowel. C diff positive on 8/12/18. Last stool was noted on 8/12.      MALNUTRITION  % Intake: Decreased intake does not meet criteria  % Weight Loss: Up to 5% in 1 month (non-severe) - Difficult to assess with fluid status.  Subcutaneous Fat Loss: Facial region:  Mild  Muscle Loss: Temporal: Mild and Thoracic region (clavicle, acromium bone, deltoid, trapezius, pectoral):  Mild  Fluid Accumulation/Edema: None noted  Malnutrition Diagnosis: Non-severe " malnutrition in the context of chronic illness    NUTRITION DIAGNOSIS  Inadequate protein-energy intake related to lack of appetite, abdominal pain as evidenced by little oral intake for the past four days.       INTERVENTIONS  Implementation  Nutrition Education: Explained nutrition POC. Pt asked how many kcals PN would provide. Pt wants to avoid wt gain.    Collaboration with other providers. Parenteral Nutrition/IV Fluids: Discussed pt with team and PharmD. Plan is to start PN. Placed PN change order: Rec the following for Central Parenteral Nutrition (CPN): Dosing wt: 75 kg, 1440 mL PN/day (or volume per PharmD or team) of 150 g dex, 135 g AA, and 250 mL of 20% IV lipids daily. This provides 1550 kcals (21 kcals/kg), 135 g protein (1.8 g protein/kg), 150 g CHO, GIR 1.4, and 32% of kcals from fat on average daily. Order a baseline triglyceride lab and triglyceride labs weekly. Adjust IVF if needed. If Phos is 2 or greater, K+/Mg++ WNL, and glycemic control acceptable, increase dextrose by 40 g/day to goal dextrose of 260 g/day. This provides 1924 kcals (26 kcals/kg), 135 g protein (1.8 g protein/kg), 260 g CHO, GIR 2.4, and 26% of kcals from fat on average daily.   Multivitamin/mineral supplement therapy: Discontinued nephrocaps as PN is starting and pt having surgery soon.        Goals  Total avg nutritional intake to meet a minimum of 25 kcal/kg and 1.2 g PRO/kg daily (per dosing wt 75 kg).     Monitoring/Evaluation  Progress toward goals will be monitored and evaluated per protocol.     Nutrition will continue to follow.      Mary Silva, MS, RD, LD, [facility]   6C Pgr: [phone]

## 2018-08-13 NOTE — BRIEF OP NOTE
[facility] Brief Operative Note    Pre-operative diagnosis: Anal Rectal Abcess   Post-operative diagnosis Perianal abscess   Procedure: Procedure(s):  EXAM UNDER ANESTHESIA RECTUM ,COMBINED INCISION AND DRAINAGE OF RECTAL ABCESS  - Wound Class: IV-Dirty or Infected   - Wound Class: IV-Dirty or Infected   Surgeon(s): Surgeon(s) and Role:     * Rick Tran MD - Primary     * Richard Galeaon MD - Assisting   Estimated blood loss: 30 mL    Specimens: * No specimens in log *   Findings: Right posterolateral abscess quite superficial in submucosal space that had started to self-drain and was opened to meet small radial  counter-incision at anal verge and marsupialized for hemostasis as wound was very oozy due to his anticoagulation.

## 2018-08-13 NOTE — CONSULTS
Mr Nicholson is a 64yo male s/p heart transplant who presented with bloody stools and subjective fevers who was found to have a pericolonic abcess, colitis and perianal abscess on CT.  IR has been consulted for possible drain placement into the 7.8cm pericolonic abscess. Per the primary team, the patient is stable without signs or symptoms of sepsis. The abscess is interposed between sigmoid colon and small bowel.     Imaging reviewed in AM rounds today and no safe window for drain placement available. IR recommends continued medical treatment and possible surgical options per recs from colorectal. Please consult IR if collection increases or changes in size where a window for safe placement may be possible.     Case discussed with Dr. Pickens and colorectal team [id number].    Ariadna Boston PA-C  Interventional Radiology

## 2018-08-13 NOTE — OP NOTE
Procedure Date: 08/12/2018.      PREOPERATIVE DIAGNOSES:   1.  Anorectal abscess.   2.  Acute diverticulitis with abscess.   3.  Pseudomonas bacteremia.   4.  Chronic kidney disease (on hemodialysis).   5.  Dyslipidemia.   6.  Hypertension.   7.  Hypertensive cardiopathy.   8.  Anemia of chronic disease and iron deficiency.     9.  Ascending aortic aneurysm.   10.  Diabetes mellitus type 2.   11.  Heart transplant.   12.  Acquired immunosuppression.   13.  Proteocaloric malnutrition.      POSTOPERATIVE DIAGNOSES:   1.  Anorectal abscess.   2.  Acute diverticulitis with abscess.   3.  Pseudomonas bacteremia.   4.  Chronic kidney disease (on hemodialysis).   5.  Dyslipidemia.   6.  Hypertension.   7.  Hypertensive cardiopathy.   8.  Anemia of chronic disease and iron deficiency.     9.  Ascending aortic aneurysm.   10.  Diabetes mellitus type 2.   11.  Heart transplant.   12.  Acquired immunosuppression.   13.  Proteocaloric malnutrition.      ANESTHESIA:  MAC plus local anesthetic.      PROCEDURES PERFORMED:   1. Evaluation under anesthesia.   2. Incision and drainage of anorectal abscess.      SURGEON:  Rick Tran MD.      ASSISTANT:  Richard Avendano MD (Colon and Rectal Surgery fellow).      BRIEF HISTORY:  Murray is a 63-year-old gentleman with a complex past medical history including recent heart transplant.  He was recently admitted to the hospital with abdominal pain.  He was treated with IV antibiotics given Pseudomonas bacteremia.  A recent CT scan of the abdomen and pelvis showed diverticulitis of the sigmoid colon with an associated abscess as well as an anorectal abscess on the right side of the anal canal.  Given recent anticoagulation as well as stable abdominal exam the patient was recommended to undergo percutaneous drainage of his diverticular abscess.  Given the coincidental finding of an anorectal abscess on CT scan we performed a perianal exam at the bedside and this showed some right-sided  "anal pain.  There is no clear evidence of necrotizing soft tissue infection or fistula.  I recommended incision and drainage in the Operating Room.  I thoroughly discussed the risks, benefits and alternatives of operative treatment with Murray and he agreed to proceed.      DESCRIPTION OF PROCEDURE:  After obtaining informed consent, the patient was brought to the Operating Room, placed in the modified lithotomy position.  MAC anesthesia was gently induced without difficulty.  The patient received appropriate preoperative antibiotic prophylaxis as well as mechanical DVT prophylaxis.  Bilateral lower extremity pneumatic compression devices were applied and all pressure points were cushioned.  The perianal area was prepped and draped in a standard sterile fashion.  A \"timeout\" was performed.  A total of 30 mL of bupivacaine 0.25% without epinephrine was injected as a pudendal block.  Digital rectal exam as well as anoscopy were performed.  These revealed active purulent drainage from the right posterior aspect of the anal canal.  There was an opening right at the intersphincteric groove that was spontaneously draining the abscess.  This was further opened approximately 2 cm.  There was no involvement of the anal sphincter complex.  We evacuated approximately 10 mL of purulent material.  No cultures were sent.  The cavity was irrigated with dilute peroxide.  Hemostasis was achieved with monopolar electrocautery.  There were multiple oozing sites despite extensive cauterization.  Peroxide was applied once again.  The entire abscess cavity wound was marsupialized with a running locking 3-0 Vicryl suture.  A piece of Gelfoam was left in place.  Instrument, sponge and needle counts were all correct as reported to me.  Hemostasis was corroborated.  A sterile dressing was applied.  The patient tolerated the procedure well.      COMPLICATIONS:  None immediately.      ESTIMATED BLOOD LOSS:  30 mL.      REPLACEMENT:  400 mL of " crystalloid.      SPECIMENS:  None.      FINDINGS:  Right posterior anorectal abscess with no evidence of fistula or necrotizing infection.      DISPOSITION:  PACU.         ELEANOR HANNAH MD             D: 2018   T: 2018   MT: RAJESH      Name:     SANCHEZ MCNEIL   MRN:      [id number]        Account:        [id number]   :      1955           Procedure Date: 2018      Document: [id number]       cc: Yahir Blum MD       [facility] Surgery Benito Lawrence MD

## 2018-08-13 NOTE — PROGRESS NOTES
"Post Transplant Patient Social Work Assessment     Patient Name: Murray Nicholson  : 1955  Age: 63 year old  MRN: [id number]  Date of transplant: 18; currently listed for kidney transplant    Patient known to me from follow up in the transplant program.  Admitted on 18 for bloody stool with abdominal pain.  Seen today to update assessment.      Presenting Information   Living Situation: Murray lives alone in an apartment  Functional Status: He reports that he was independent  Cultural/Language/Spiritual Considerations: none noted    Support System  Primary Support Person sons help as needed  Other support:  Friends are available  Plan for support in immediate post-hospital period: unknown if needed at this time    Health Care Directive  Decision Maker: self  Alternate Decision Maker: son-Jasper  Health Care Directive: Copy in Chart    Mental Health/Coping:   History of Mental Health: none noted at this time  History of Chemical Health: none noted  Current status: Murray reports that he just had a visit from a volunteer who did \"healing touch\" and he found it helpful.   Coping: Murray reports that he's doing as well as he can at this time.   Services Needed/Recommended: none at this time.     Financial   Income: SSDI and long term disability  Impact of transplant on income: Murray went on disability due to not being able to return to work  Insurance and medication coverage: Medicare and supplement   Financial concerns: none at this time  Resources needed: none at this time    Discharge Plan   Patient and family discharge goal: Murray is hoping that he'll be able to avoid needing colostomy. He is hoping to return home.   Barriers to discharge: medical stability     Education provided by SW: Social Work role inpatient setting, availability of support groups, parking information    Assessment and recommendations and plan:    Murray remains hopeful that he will be able to return home at his baseline. He denies needs at " this time.    SW will continue to be available as needed for support. No SW dc need at this time.    Pager [phone]

## 2018-08-13 NOTE — PROGRESS NOTES
Post-Operative Check  8/13/2018     Subjective:  Mr. Nicholson is doing well post-operatively. His pain is well controlled. He has not voided and has passed flatus. He denies CP, SOB, n/v/d.     Objective:  Temp:  [97.9  F (36.6  C)-99.5  F (37.5  C)] 97.9  F (36.6  C)  Pulse:  [80-82] 80  Heart Rate:  [76-90] 80  Resp:  [14-18] 14  BP: (111-179)/() 137/88  SpO2:  [99 %-100 %] 100 %  I/O last 3 completed shifts:  In: 250 [I.V.:250]  Out: 30 [Blood:30]    Gen: Resting comfortably, NAD  Resp: NLB   Abd: Soft, ND, NT   Ext: WWP  Dressing/Incision: OR dressing in place, no shadowing observed, no surrounding tenderness on buttocks      A/P: Murray Nicholson is a 63 year old male POD0 for Incision and Drainage of rectal abscess.     Neuro/Pain/Sedation: well controlled on current regimen  Pulm: encourage IS  CV: HDS  GI: + flatus, no BM  : has not voided  MSK: encourage ambulation     DVT Ppx: PCD     Disposition: per primary team recommendations       Mary Grace Chao MD  PGY-1 General Surgery

## 2018-08-13 NOTE — PROGRESS NOTES
Cardiology 2 Progress Note           Assessment and Plan:   Murray Nicholson is a 63 year old male with a h/o NICM s/p heart txp (6/14/18), ESRD on HD, R internal jugular thrombus on apixaban and DM2 p/w 3-4 bright red BM and fevers/chills, found to have a daniella-colonic abscess (7.8 cm), daniella-rectal abscess (2.7 cm), probable diverticulitis, and R colon colitis.  Planned for daniella-rectal abscess drainage tonight, likely will require colectomy later this week pending discussion between colorectal and IR.    # Daniella-colonic abscess (7.8 cm)  # Daniella-rectal abscess (2.7 cm) s/p drainage 8/12  # Probable diverticulitis  # R colon colitis  # Positive c. Diff  C diff positive overnight, already on flagyl but will add PO vanc.  Colorectal plans to discuss with IR but anticipates probable need for colectomy this week.  Currently is afebrile and hemodynamically stable; does not appear overtly septic but will watch closely.  - Cefepime/metronidazole per ID; would add meropenem if patient decompensating  - Colorectal, ID, IR following, appreciate assistance  - Hold ASA, subcutaneous heparin; 10a level remains therapeutic this AM    # S/p heart transplant 6/13/2018 (donor 3 vessel CAD  HSV1-, HSV1+, CMV D+/R-, EBV D?/R+, VZV+  tacro goal 8 due to infx, last heart bx 7/5/2018 was 0R  8/10/2018 bx results still pending)   - Lowered prednisone to 10/10 today given likely upcoming surgery  - Will continue tacro at 1/2 and MMF at 500 BID  - Continue statin  - Due for pentamadine 8/17  - RHC biopsy from 8/10 pending    # Recent pseudomonas bacteremia  # Necrotic mouth ulcer, resolving  Had profound neutropenia and pseudomonas bacteremia several weeks ago in associated with necrotic mouth ulcer; neutropenia likely immunosuppression induced.  Neutropenia resolved last admission and pseudomonal bacteremia is likely cleared.  Necrotic ulcer appears to be resolving from prior admission.  Not currently active issues but will continue to  "monitor.    # Pulmonary nodules  Incidental on CT, will need interval follow up in 4-6 weeks.    # Recent R internal jugular thrombus  - Holding apixaban in setting of pending surgery    # ESRD on TTS HD  - Nephrology consulted, appreciate assistance    # DM2 - SSI    FEN: NPO  PPx: Heparin held for surgery; will likely resume subcutaneous this AM  Dispo: Pending surgical plan    Bobby Bearden MD PGY-3  Internal Medicine Resident  [phone]    Patient was seen by and the above plan discussed with Dr. Blum.         Subjective:   Overnight had daniella-rectal abscess drained, tolerated procedure well.  Feels \"much better\" this AM beyond mild pain at drain site.          Medications:       acetaminophen  1,000 mg Oral 4x Daily     biotin  5 mg Oral Daily     ceFEPIme (MAXIPIME) IV  1 g Intravenous Q24H     fluticasone  1-2 spray Both Nostrils Daily     insulin aspart  1-6 Units Subcutaneous Q4H     metroNIDAZOLE  500 mg Intravenous Q8H     mycophenolate  500 mg Oral BID IS     NEPHROCAPS  1 capsule Oral Daily     nystatin  1,000,000 Units Swish & Swallow 4x Daily     pantoprazole  40 mg Oral QAM     predniSONE  10 mg Oral BID     rosuvastatin  20 mg Oral Daily     sodium chloride (PF)  3 mL Intracatheter Q8H     tacrolimus  1 mg Oral QAM     tacrolimus  2 mg Oral QPM     triamcinolone   Topical BID       lactated ringers       - MEDICATION INSTRUCTIONS -       Patient RECEIVING antibiotic to treat a different condition and it provides ADEQUATE COVERAGE for this surgical procedure.                 Objective:          Physical Exam:   Temp:  [97.9  F (36.6  C)-99.5  F (37.5  C)] 98.3  F (36.8  C)  Pulse:  [80-82] 80  Heart Rate:  [76-89] 82  Resp:  [14-18] 16  BP: (122-179)/() 138/88  SpO2:  [99 %-100 %] 100 %  I/O last 3 completed shifts:  In: 568 [P.O.:60; I.V.:508]  Out: 30 [Blood:30]    Vitals:    08/12/18 0228   Weight: 75 kg (165 lb 5.5 oz)       GEN:  Alert, interactive, appears comfortable, " NAD.  HEENT:  Necrotic ulcer at roof of mouth stable, no new ulcers, no redness/swelling.  CV:  Regular rate and rhythm, no murmur or JVD.   LUNGS:  On room air, normal work of breathing, Clear to auscultation bilaterally, no wheezes or crackles.   ABD:  Mild LUQ/LLQ pain.  Mild rebound, no guarding, no rigidity.  EXT:  No edema or cyanosis.  Hands/feet warm to touch with good signs of peripheral perfusion.   SKIN:  Dry to touch, no exanthems noted in the visualized areas.         Data:   BMP    Recent Labs  Lab 08/13/18  0121 08/12/18  1253 08/12/18  0834 08/12/18  0230    136 138 137   POTASSIUM 3.9 3.8 3.5 3.5   CHLORIDE 101 100 101 103   TRINI 7.8* 7.5* 7.4* 7.6*   CO2 23 25 25 26   BUN 34* 26 24 22   CR 5.27* 4.45* 4.01* 3.60*   GLC 81 131* 108* 38*     LFTs    Recent Labs  Lab 08/08/18  0921   ALKPHOS 156*   AST 34   ALT 22   BILITOTAL 0.4      CBC  Recent Labs  Lab 08/13/18  0618  08/12/18  1253  08/12/18  0834 08/12/18  0230   WBC 5.2  --  4.8  --  4.9 4.7   RBC 3.00*  --  2.52*  --  2.52* 2.78*   HGB 8.6*  < > 7.5*  < > 7.4* 8.2*   HCT 27.0*  --  23.7*  --  23.2* 25.8*   MCV 90  --  94  --  92 93   MCH 28.7  --  29.8  --  29.4 29.5   MCHC 31.9  --  31.6  --  31.9 31.8   RDW 21.0*  --  22.2*  --  22.0* 22.0*   *  --  111*  --  131* 136*   < > = values in this interval not displayed.  INR    Recent Labs  Lab 08/12/18  0834 08/12/18  0230   INR 1.31* 1.25*

## 2018-08-14 ENCOUNTER — ANESTHESIA (OUTPATIENT)
Dept: SURGERY | Facility: CLINIC | Age: 63
DRG: 329 | End: 2018-08-14
Payer: MEDICARE

## 2018-08-14 PROBLEM — K57.32 SIGMOID DIVERTICULITIS: Status: ACTIVE | Noted: 2018-08-14

## 2018-08-14 LAB
ABO + RH BLD: NORMAL
ABO + RH BLD: NORMAL
ALBUMIN SERPL-MCNC: 1.6 G/DL (ref 3.4–5)
ALP SERPL-CCNC: 110 U/L (ref 40–150)
ALT SERPL W P-5'-P-CCNC: 17 U/L (ref 0–70)
ANION GAP SERPL CALCULATED.3IONS-SCNC: 10 MMOL/L (ref 3–14)
AST SERPL W P-5'-P-CCNC: 29 U/L (ref 0–45)
BACTERIA SPEC CULT: NORMAL
BASOPHILS # BLD AUTO: 0 10E9/L (ref 0–0.2)
BASOPHILS NFR BLD AUTO: 0 %
BILIRUB SERPL-MCNC: 0.3 MG/DL (ref 0.2–1.3)
BLD GP AB SCN SERPL QL: NORMAL
BLD PROD TYP BPU: NORMAL
BLD UNIT ID BPU: 0
BLOOD BANK CMNT PATIENT-IMP: NORMAL
BLOOD PRODUCT CODE: NORMAL
BPU ID: NORMAL
BUN SERPL-MCNC: 48 MG/DL (ref 7–30)
CALCIUM SERPL-MCNC: 7.5 MG/DL (ref 8.5–10.1)
CHLORIDE SERPL-SCNC: 102 MMOL/L (ref 94–109)
CO2 SERPL-SCNC: 20 MMOL/L (ref 20–32)
CREAT SERPL-MCNC: 7.08 MG/DL (ref 0.66–1.25)
CRP SERPL-MCNC: 230 MG/L (ref 0–8)
CRYPTOSP AG STL QL IA: NEGATIVE
DIFFERENTIAL METHOD BLD: ABNORMAL
EOSINOPHIL # BLD AUTO: 0 10E9/L (ref 0–0.7)
EOSINOPHIL NFR BLD AUTO: 0.3 %
ERYTHROCYTE [DISTWIDTH] IN BLOOD BY AUTOMATED COUNT: 20.9 % (ref 10–15)
GFR SERPL CREATININE-BSD FRML MDRD: 8 ML/MIN/1.7M2
GLUCOSE BLDC GLUCOMTR-MCNC: 109 MG/DL (ref 70–99)
GLUCOSE BLDC GLUCOMTR-MCNC: 113 MG/DL (ref 70–99)
GLUCOSE BLDC GLUCOMTR-MCNC: 114 MG/DL (ref 70–99)
GLUCOSE BLDC GLUCOMTR-MCNC: 122 MG/DL (ref 70–99)
GLUCOSE BLDC GLUCOMTR-MCNC: 87 MG/DL (ref 70–99)
GLUCOSE BLDC GLUCOMTR-MCNC: 88 MG/DL (ref 70–99)
GLUCOSE BLDC GLUCOMTR-MCNC: 95 MG/DL (ref 70–99)
GLUCOSE BLDC GLUCOMTR-MCNC: 97 MG/DL (ref 70–99)
GLUCOSE SERPL-MCNC: 106 MG/DL (ref 70–99)
HCT VFR BLD AUTO: 27.7 % (ref 40–53)
HGB BLD-MCNC: 9 G/DL (ref 13.3–17.7)
IMM GRANULOCYTES # BLD: 0 10E9/L (ref 0–0.4)
IMM GRANULOCYTES NFR BLD: 1.1 %
LMWH PPP CHRO-ACNC: 0.28 IU/ML
LYMPHOCYTES # BLD AUTO: 0.4 10E9/L (ref 0.8–5.3)
LYMPHOCYTES NFR BLD AUTO: 9.9 %
MAGNESIUM SERPL-MCNC: 2.5 MG/DL (ref 1.6–2.3)
MCH RBC QN AUTO: 28.9 PG (ref 26.5–33)
MCHC RBC AUTO-ENTMCNC: 32.5 G/DL (ref 31.5–36.5)
MCV RBC AUTO: 89 FL (ref 78–100)
MONOCYTES # BLD AUTO: 0.5 10E9/L (ref 0–1.3)
MONOCYTES NFR BLD AUTO: 12.4 %
NEUTROPHILS # BLD AUTO: 2.8 10E9/L (ref 1.6–8.3)
NEUTROPHILS NFR BLD AUTO: 76.3 %
NRBC # BLD AUTO: 0 10*3/UL
NRBC BLD AUTO-RTO: 0 /100
NUM BPU REQUESTED: 3
PHOSPHATE SERPL-MCNC: 5.7 MG/DL (ref 2.5–4.5)
PLATELET # BLD AUTO: 103 10E9/L (ref 150–450)
POTASSIUM SERPL-SCNC: 4.5 MMOL/L (ref 3.4–5.3)
PROCALCITONIN SERPL-MCNC: 29.21 NG/ML
PROT SERPL-MCNC: 5.3 G/DL (ref 6.8–8.8)
RBC # BLD AUTO: 3.11 10E12/L (ref 4.4–5.9)
SODIUM SERPL-SCNC: 133 MMOL/L (ref 133–144)
SPECIMEN EXP DATE BLD: NORMAL
SPECIMEN SOURCE: NORMAL
TRANSFUSION STATUS PATIENT QL: NORMAL
TRIGL SERPL-MCNC: 156 MG/DL
WBC # BLD AUTO: 3.7 10E9/L (ref 4–11)

## 2018-08-14 PROCEDURE — 0DN80ZZ RELEASE SMALL INTESTINE, OPEN APPROACH: ICD-10-PCS | Performed by: COLON & RECTAL SURGERY

## 2018-08-14 PROCEDURE — 25000128 H RX IP 250 OP 636: Performed by: STUDENT IN AN ORGANIZED HEALTH CARE EDUCATION/TRAINING PROGRAM

## 2018-08-14 PROCEDURE — A9270 NON-COVERED ITEM OR SERVICE: HCPCS | Mod: GY | Performed by: COLON & RECTAL SURGERY

## 2018-08-14 PROCEDURE — 87186 SC STD MICRODIL/AGAR DIL: CPT | Performed by: COLON & RECTAL SURGERY

## 2018-08-14 PROCEDURE — 25000131 ZZH RX MED GY IP 250 OP 636 PS 637: Mod: GY | Performed by: STUDENT IN AN ORGANIZED HEALTH CARE EDUCATION/TRAINING PROGRAM

## 2018-08-14 PROCEDURE — 40000275 ZZH STATISTIC RCP TIME EA 10 MIN

## 2018-08-14 PROCEDURE — 86140 C-REACTIVE PROTEIN: CPT | Performed by: INTERNAL MEDICINE

## 2018-08-14 PROCEDURE — 37000008 ZZH ANESTHESIA TECHNICAL FEE, 1ST 30 MIN: Performed by: COLON & RECTAL SURGERY

## 2018-08-14 PROCEDURE — 40000014 ZZH STATISTIC ARTERIAL MONITORING DAILY

## 2018-08-14 PROCEDURE — 71000014 ZZH RECOVERY PHASE 1 LEVEL 2 FIRST HR: Performed by: COLON & RECTAL SURGERY

## 2018-08-14 PROCEDURE — 25000132 ZZH RX MED GY IP 250 OP 250 PS 637: Mod: GY | Performed by: INTERNAL MEDICINE

## 2018-08-14 PROCEDURE — 87076 CULTURE ANAEROBE IDENT EACH: CPT | Performed by: COLON & RECTAL SURGERY

## 2018-08-14 PROCEDURE — 84478 ASSAY OF TRIGLYCERIDES: CPT | Performed by: INTERNAL MEDICINE

## 2018-08-14 PROCEDURE — 36000068 ZZH SURGERY LEVEL 5 1ST 30 MIN - UMMC: Performed by: COLON & RECTAL SURGERY

## 2018-08-14 PROCEDURE — 90937 HEMODIALYSIS REPEATED EVAL: CPT

## 2018-08-14 PROCEDURE — P9016 RBC LEUKOCYTES REDUCED: HCPCS | Performed by: EMERGENCY MEDICINE

## 2018-08-14 PROCEDURE — 00000146 ZZHCL STATISTIC GLUCOSE BY METER IP

## 2018-08-14 PROCEDURE — C9290 INJ, BUPIVACAINE LIPOSOME: HCPCS | Performed by: STUDENT IN AN ORGANIZED HEALTH CARE EDUCATION/TRAINING PROGRAM

## 2018-08-14 PROCEDURE — 25000128 H RX IP 250 OP 636: Performed by: COLON & RECTAL SURGERY

## 2018-08-14 PROCEDURE — 25000125 ZZHC RX 250: Performed by: STUDENT IN AN ORGANIZED HEALTH CARE EDUCATION/TRAINING PROGRAM

## 2018-08-14 PROCEDURE — 99233 SBSQ HOSP IP/OBS HIGH 50: CPT | Mod: GC | Performed by: INTERNAL MEDICINE

## 2018-08-14 PROCEDURE — 0DTN0ZZ RESECTION OF SIGMOID COLON, OPEN APPROACH: ICD-10-PCS | Performed by: COLON & RECTAL SURGERY

## 2018-08-14 PROCEDURE — 36415 COLL VENOUS BLD VENIPUNCTURE: CPT | Performed by: INTERNAL MEDICINE

## 2018-08-14 PROCEDURE — 25000565 ZZH ISOFLURANE, EA 15 MIN: Performed by: COLON & RECTAL SURGERY

## 2018-08-14 PROCEDURE — 25000132 ZZH RX MED GY IP 250 OP 250 PS 637: Mod: GY | Performed by: STUDENT IN AN ORGANIZED HEALTH CARE EDUCATION/TRAINING PROGRAM

## 2018-08-14 PROCEDURE — 36000070 ZZH SURGERY LEVEL 5 EA 15 ADDTL MIN - UMMC: Performed by: COLON & RECTAL SURGERY

## 2018-08-14 PROCEDURE — 25000125 ZZHC RX 250: Performed by: NURSE ANESTHETIST, CERTIFIED REGISTERED

## 2018-08-14 PROCEDURE — 5A1D70Z PERFORMANCE OF URINARY FILTRATION, INTERMITTENT, LESS THAN 6 HOURS PER DAY: ICD-10-PCS | Performed by: INTERNAL MEDICINE

## 2018-08-14 PROCEDURE — 25000128 H RX IP 250 OP 636: Performed by: ANESTHESIOLOGY

## 2018-08-14 PROCEDURE — 21400006 ZZH R&B CCU INTERMEDIATE UMMC

## 2018-08-14 PROCEDURE — 84145 PROCALCITONIN (PCT): CPT | Performed by: INTERNAL MEDICINE

## 2018-08-14 PROCEDURE — 85520 HEPARIN ASSAY: CPT | Performed by: INTERNAL MEDICINE

## 2018-08-14 PROCEDURE — 25000131 ZZH RX MED GY IP 250 OP 636 PS 637: Mod: GY | Performed by: INTERNAL MEDICINE

## 2018-08-14 PROCEDURE — 25000125 ZZHC RX 250: Performed by: COLON & RECTAL SURGERY

## 2018-08-14 PROCEDURE — 25000128 H RX IP 250 OP 636

## 2018-08-14 PROCEDURE — A9270 NON-COVERED ITEM OR SERVICE: HCPCS | Mod: GY | Performed by: INTERNAL MEDICINE

## 2018-08-14 PROCEDURE — 88307 TISSUE EXAM BY PATHOLOGIST: CPT | Performed by: COLON & RECTAL SURGERY

## 2018-08-14 PROCEDURE — 80053 COMPREHEN METABOLIC PANEL: CPT | Performed by: INTERNAL MEDICINE

## 2018-08-14 PROCEDURE — A9270 NON-COVERED ITEM OR SERVICE: HCPCS | Mod: GY | Performed by: STUDENT IN AN ORGANIZED HEALTH CARE EDUCATION/TRAINING PROGRAM

## 2018-08-14 PROCEDURE — 71000015 ZZH RECOVERY PHASE 1 LEVEL 2 EA ADDTL HR: Performed by: COLON & RECTAL SURGERY

## 2018-08-14 PROCEDURE — C1765 ADHESION BARRIER: HCPCS | Performed by: COLON & RECTAL SURGERY

## 2018-08-14 PROCEDURE — 85025 COMPLETE CBC W/AUTO DIFF WBC: CPT | Performed by: INTERNAL MEDICINE

## 2018-08-14 PROCEDURE — 0DB80ZZ EXCISION OF SMALL INTESTINE, OPEN APPROACH: ICD-10-PCS | Performed by: COLON & RECTAL SURGERY

## 2018-08-14 PROCEDURE — P9073 PLATELETS PHERESIS PATH REDU: HCPCS | Performed by: INTERNAL MEDICINE

## 2018-08-14 PROCEDURE — 87077 CULTURE AEROBIC IDENTIFY: CPT | Performed by: COLON & RECTAL SURGERY

## 2018-08-14 PROCEDURE — 25000128 H RX IP 250 OP 636: Performed by: NURSE ANESTHETIST, CERTIFIED REGISTERED

## 2018-08-14 PROCEDURE — C9399 UNCLASSIFIED DRUGS OR BIOLOG: HCPCS | Performed by: NURSE ANESTHETIST, CERTIFIED REGISTERED

## 2018-08-14 PROCEDURE — 83735 ASSAY OF MAGNESIUM: CPT | Performed by: INTERNAL MEDICINE

## 2018-08-14 PROCEDURE — 84100 ASSAY OF PHOSPHORUS: CPT | Performed by: INTERNAL MEDICINE

## 2018-08-14 PROCEDURE — 25000132 ZZH RX MED GY IP 250 OP 250 PS 637: Mod: GY | Performed by: COLON & RECTAL SURGERY

## 2018-08-14 PROCEDURE — 87070 CULTURE OTHR SPECIMN AEROBIC: CPT | Performed by: COLON & RECTAL SURGERY

## 2018-08-14 PROCEDURE — 37000009 ZZH ANESTHESIA TECHNICAL FEE, EACH ADDTL 15 MIN: Performed by: COLON & RECTAL SURGERY

## 2018-08-14 PROCEDURE — 87075 CULTR BACTERIA EXCEPT BLOOD: CPT | Performed by: COLON & RECTAL SURGERY

## 2018-08-14 PROCEDURE — 0D1M0Z4 BYPASS DESCENDING COLON TO CUTANEOUS, OPEN APPROACH: ICD-10-PCS | Performed by: COLON & RECTAL SURGERY

## 2018-08-14 PROCEDURE — 27210794 ZZH OR GENERAL SUPPLY STERILE: Performed by: COLON & RECTAL SURGERY

## 2018-08-14 PROCEDURE — 40000170 ZZH STATISTIC PRE-PROCEDURE ASSESSMENT II: Performed by: COLON & RECTAL SURGERY

## 2018-08-14 RX ORDER — FENTANYL CITRATE 50 UG/ML
INJECTION, SOLUTION INTRAMUSCULAR; INTRAVENOUS PRN
Status: DISCONTINUED | OUTPATIENT
Start: 2018-08-14 | End: 2018-08-14

## 2018-08-14 RX ORDER — PROPOFOL 10 MG/ML
INJECTION, EMULSION INTRAVENOUS PRN
Status: DISCONTINUED | OUTPATIENT
Start: 2018-08-14 | End: 2018-08-14

## 2018-08-14 RX ORDER — FENTANYL CITRATE 50 UG/ML
25-50 INJECTION, SOLUTION INTRAMUSCULAR; INTRAVENOUS
Status: DISCONTINUED | OUTPATIENT
Start: 2018-08-14 | End: 2018-08-14 | Stop reason: HOSPADM

## 2018-08-14 RX ORDER — SODIUM CHLORIDE 9 MG/ML
INJECTION, SOLUTION INTRAVENOUS CONTINUOUS
Status: DISCONTINUED | OUTPATIENT
Start: 2018-08-14 | End: 2018-08-14 | Stop reason: HOSPADM

## 2018-08-14 RX ORDER — NALOXONE HYDROCHLORIDE 0.4 MG/ML
.1-.4 INJECTION, SOLUTION INTRAMUSCULAR; INTRAVENOUS; SUBCUTANEOUS
Status: DISCONTINUED | OUTPATIENT
Start: 2018-08-14 | End: 2018-08-14 | Stop reason: HOSPADM

## 2018-08-14 RX ORDER — HYDROMORPHONE HYDROCHLORIDE 1 MG/ML
.3-.5 INJECTION, SOLUTION INTRAMUSCULAR; INTRAVENOUS; SUBCUTANEOUS EVERY 5 MIN PRN
Status: DISCONTINUED | OUTPATIENT
Start: 2018-08-14 | End: 2018-08-14 | Stop reason: HOSPADM

## 2018-08-14 RX ORDER — LIDOCAINE HYDROCHLORIDE 20 MG/ML
INJECTION, SOLUTION INFILTRATION; PERINEURAL PRN
Status: DISCONTINUED | OUTPATIENT
Start: 2018-08-14 | End: 2018-08-14

## 2018-08-14 RX ORDER — BUPIVACAINE HYDROCHLORIDE 2.5 MG/ML
INJECTION, SOLUTION EPIDURAL; INFILTRATION; INTRACAUDAL PRN
Status: DISCONTINUED | OUTPATIENT
Start: 2018-08-14 | End: 2018-08-14

## 2018-08-14 RX ORDER — FLUMAZENIL 0.1 MG/ML
0.2 INJECTION, SOLUTION INTRAVENOUS
Status: DISCONTINUED | OUTPATIENT
Start: 2018-08-14 | End: 2018-08-14 | Stop reason: HOSPADM

## 2018-08-14 RX ORDER — SODIUM CHLORIDE, SODIUM LACTATE, POTASSIUM CHLORIDE, CALCIUM CHLORIDE 600; 310; 30; 20 MG/100ML; MG/100ML; MG/100ML; MG/100ML
INJECTION, SOLUTION INTRAVENOUS CONTINUOUS
Status: DISCONTINUED | OUTPATIENT
Start: 2018-08-14 | End: 2018-08-14 | Stop reason: HOSPADM

## 2018-08-14 RX ORDER — ONDANSETRON 2 MG/ML
INJECTION INTRAMUSCULAR; INTRAVENOUS PRN
Status: DISCONTINUED | OUTPATIENT
Start: 2018-08-14 | End: 2018-08-14

## 2018-08-14 RX ORDER — ONDANSETRON 2 MG/ML
4 INJECTION INTRAMUSCULAR; INTRAVENOUS EVERY 30 MIN PRN
Status: DISCONTINUED | OUTPATIENT
Start: 2018-08-14 | End: 2018-08-14 | Stop reason: HOSPADM

## 2018-08-14 RX ORDER — SODIUM CHLORIDE, SODIUM LACTATE, POTASSIUM CHLORIDE, CALCIUM CHLORIDE 600; 310; 30; 20 MG/100ML; MG/100ML; MG/100ML; MG/100ML
INJECTION, SOLUTION INTRAVENOUS CONTINUOUS
Status: DISCONTINUED | OUTPATIENT
Start: 2018-08-14 | End: 2018-08-21

## 2018-08-14 RX ORDER — ONDANSETRON 4 MG/1
4 TABLET, ORALLY DISINTEGRATING ORAL EVERY 30 MIN PRN
Status: DISCONTINUED | OUTPATIENT
Start: 2018-08-14 | End: 2018-08-14 | Stop reason: HOSPADM

## 2018-08-14 RX ORDER — ACETAMINOPHEN 325 MG/1
975 TABLET ORAL ONCE
Status: COMPLETED | OUTPATIENT
Start: 2018-08-14 | End: 2018-08-14

## 2018-08-14 RX ORDER — BUPIVACAINE HYDROCHLORIDE 2.5 MG/ML
INJECTION, SOLUTION EPIDURAL; INFILTRATION; INTRACAUDAL PRN
Status: DISCONTINUED | OUTPATIENT
Start: 2018-08-14 | End: 2018-08-14 | Stop reason: HOSPADM

## 2018-08-14 RX ADMIN — Medication 0.3 MG: at 18:06

## 2018-08-14 RX ADMIN — BUPIVACAINE HYDROCHLORIDE 20 ML: 2.5 INJECTION, SOLUTION EPIDURAL; INFILTRATION; INTRACAUDAL at 12:57

## 2018-08-14 RX ADMIN — PREDNISONE 10 MG: 10 TABLET ORAL at 20:04

## 2018-08-14 RX ADMIN — Medication 0.2 MG: at 18:15

## 2018-08-14 RX ADMIN — Medication: at 07:51

## 2018-08-14 RX ADMIN — ONDANSETRON 4 MG: 2 INJECTION INTRAMUSCULAR; INTRAVENOUS at 12:25

## 2018-08-14 RX ADMIN — PROPOFOL 100 MG: 10 INJECTION, EMULSION INTRAVENOUS at 12:47

## 2018-08-14 RX ADMIN — FENTANYL CITRATE 100 MCG: 50 INJECTION, SOLUTION INTRAMUSCULAR; INTRAVENOUS at 12:43

## 2018-08-14 RX ADMIN — FENTANYL CITRATE 50 MCG: 50 INJECTION, SOLUTION INTRAMUSCULAR; INTRAVENOUS at 14:45

## 2018-08-14 RX ADMIN — Medication: at 18:08

## 2018-08-14 RX ADMIN — METRONIDAZOLE 500 MG: 500 INJECTION, SOLUTION INTRAVENOUS at 13:09

## 2018-08-14 RX ADMIN — METRONIDAZOLE 500 MG: 500 INJECTION, SOLUTION INTRAVENOUS at 00:19

## 2018-08-14 RX ADMIN — ROCURONIUM BROMIDE 40 MG: 10 INJECTION INTRAVENOUS at 12:47

## 2018-08-14 RX ADMIN — MIDAZOLAM 2 MG: 1 INJECTION INTRAMUSCULAR; INTRAVENOUS at 12:25

## 2018-08-14 RX ADMIN — TACROLIMUS 2 MG: 1 CAPSULE ORAL at 20:05

## 2018-08-14 RX ADMIN — SODIUM CHLORIDE 250 ML: 9 INJECTION, SOLUTION INTRAVENOUS at 07:50

## 2018-08-14 RX ADMIN — ROCURONIUM BROMIDE 30 MG: 10 INJECTION INTRAVENOUS at 13:47

## 2018-08-14 RX ADMIN — CEFEPIME HYDROCHLORIDE 1 G: 1 INJECTION, POWDER, FOR SOLUTION INTRAMUSCULAR; INTRAVENOUS at 22:29

## 2018-08-14 RX ADMIN — VANCOMYCIN HYDROCHLORIDE 125 MG: KIT at 22:13

## 2018-08-14 RX ADMIN — PREDNISONE 10 MG: 10 TABLET ORAL at 09:48

## 2018-08-14 RX ADMIN — ROCURONIUM BROMIDE 30 MG: 10 INJECTION INTRAVENOUS at 13:08

## 2018-08-14 RX ADMIN — ROSUVASTATIN CALCIUM 20 MG: 20 TABLET, FILM COATED ORAL at 09:48

## 2018-08-14 RX ADMIN — ACETAMINOPHEN 975 MG: 325 TABLET, FILM COATED ORAL at 11:56

## 2018-08-14 RX ADMIN — PHENYLEPHRINE HYDROCHLORIDE 100 MCG: 10 INJECTION, SOLUTION INTRAMUSCULAR; INTRAVENOUS; SUBCUTANEOUS at 12:47

## 2018-08-14 RX ADMIN — MYCOPHENOLATE MOFETIL 500 MG: 250 CAPSULE ORAL at 09:48

## 2018-08-14 RX ADMIN — NYSTATIN 1000000 UNITS: 100000 SUSPENSION ORAL at 20:04

## 2018-08-14 RX ADMIN — LIDOCAINE HYDROCHLORIDE 100 MG: 20 INJECTION, SOLUTION INFILTRATION; PERINEURAL at 12:43

## 2018-08-14 RX ADMIN — MYCOPHENOLATE MOFETIL 500 MG: 250 CAPSULE ORAL at 20:05

## 2018-08-14 RX ADMIN — TRIAMCINOLONE ACETONIDE: 1 OINTMENT TOPICAL at 20:06

## 2018-08-14 RX ADMIN — FENTANYL CITRATE 100 MCG: 50 INJECTION, SOLUTION INTRAMUSCULAR; INTRAVENOUS at 13:42

## 2018-08-14 RX ADMIN — SODIUM CHLORIDE: 9 INJECTION, SOLUTION INTRAVENOUS at 12:00

## 2018-08-14 RX ADMIN — BUPIVACAINE 20 ML: 13.3 INJECTION, SUSPENSION, LIPOSOMAL INFILTRATION at 12:57

## 2018-08-14 RX ADMIN — SODIUM CHLORIDE 300 ML: 9 INJECTION, SOLUTION INTRAVENOUS at 07:50

## 2018-08-14 RX ADMIN — TACROLIMUS 1 MG: 1 CAPSULE ORAL at 09:49

## 2018-08-14 RX ADMIN — Medication 0.5 MG: at 18:32

## 2018-08-14 RX ADMIN — CISATRACURIUM BESYLATE 6 MG: 2 INJECTION INTRAVENOUS at 13:54

## 2018-08-14 RX ADMIN — PROPOFOL 50 MG: 10 INJECTION, EMULSION INTRAVENOUS at 12:43

## 2018-08-14 RX ADMIN — ACETAMINOPHEN 1000 MG: 500 TABLET, FILM COATED ORAL at 20:04

## 2018-08-14 RX ADMIN — FENTANYL CITRATE 50 MCG: 50 INJECTION, SOLUTION INTRAMUSCULAR; INTRAVENOUS at 17:24

## 2018-08-14 RX ADMIN — SUGAMMADEX 200 MG: 100 INJECTION, SOLUTION INTRAVENOUS at 16:27

## 2018-08-14 RX ADMIN — SODIUM CHLORIDE, POTASSIUM CHLORIDE, SODIUM LACTATE AND CALCIUM CHLORIDE: 600; 310; 30; 20 INJECTION, SOLUTION INTRAVENOUS at 22:12

## 2018-08-14 RX ADMIN — Medication 250 MG: at 20:04

## 2018-08-14 RX ADMIN — VANCOMYCIN HYDROCHLORIDE 125 MG: KIT at 09:49

## 2018-08-14 RX ADMIN — ROCURONIUM BROMIDE 30 MG: 10 INJECTION INTRAVENOUS at 13:29

## 2018-08-14 ASSESSMENT — ACTIVITIES OF DAILY LIVING (ADL)
ADLS_ACUITY_SCORE: 11
ADLS_ACUITY_SCORE: 12

## 2018-08-14 NOTE — PROGRESS NOTES
CLINICAL NUTRITION SERVICES     Nutrition Prescription    Recommendations already ordered by Registered Dietitian (RD):  Parenteral Nutrition (PN) change order    Future/Additional Recommendations:  For all recs, see nutrition note 8/13      Diet: NPO  PN: Started yesterday (8/13)    INTERVENTIONS:  Implementation:  Placed order to check phos this morning (8/14/2018)   Parenteral Nutrition/IV Fluids: Placed order to increase PN dextrose to 190 g/day.     Follow up/Monitoring:  Will continue to follow pt.    Mary Silva, MS, RD, LD, [facility]   6C Pgr: [phone]

## 2018-08-14 NOTE — PROGRESS NOTES
[facility]  Transplant Infectious Disease Progress Note     Patient:  Murray Nicholson, Date of birth 1955, Medical record number [id number]  Date of Visit:  2018         Assessment and Recommendations:   Recommendations:  - Continue cefepime and metronidazole for empiric coverage of enteric organisms and Pseudomonas, which had grown from blood cultures previously.  - Agree with PO vancomycin for C difficile  - Continue induction therapy dosing for ganciclovir 1.25mg/kg three times weekly for possible CMV disease with monitoring of CBC diff while on therapy.  - Once patient is off cefepime therapy, we would recommend checking surveillance blood cultures 1 and 2 weeks after cessation of therapy to document clearance of bacteremia.  - For PJP prophylaxis, we recommend resuming bactrim regimen 1 SS tab three times weekly (HD dosing) since leukopenia is resolved.  - For small pulmonary nodules, we would recommend sooner interval imaging in transplant patient with repeat CT in 4-6 weeks.  - Please obtain material for cultures (aerobic, anaerobic, fungal) and histopathology to look for evidence of CMV     Assessment:  Mr. Nicholson is a 63-year-old man with history of ischemic/valvular cardiomyopathy s/p OHT on 18 (induction with solumedrol and maintenance with tacrolimus, MMF, and prednisone), ESRD on TThSa hemodialysis currently listed for kidney transplantation, history of polymicrobial peritonitis with Candida glabrata when previously on peritoneal dialysis in 2018, recent Pseudomonas aeruginosa bacteremia on 18 with retained dialysis catheter, hypertension, hyperlipidemia, and type 2 diabetes who was admitted on  with subjective fevers, severe lower abdominal pain, and bloody stools.  Now C diff positive.     ID issues include the followin. C difficile colitis with 7.8 cm daniella-colonic and smaller 2.7 cm perirectal abscesses: During his prior admission, he had  imaging findings suggestive of early colitis and more recent imaging from 8/12 suggesting progression of this now with development of abscesses.  Throughout this time period, he had been on broad-spectrum coverage with cefepime which should suppress many enteric organisms though with holes in anaerobic coverage that could explain progression of symptoms.  He is now also C diff positive.  Due to considering of many factors including that CMV colitis can occur without viremia, the fact that he is high-risk due to CMV recipient negative serostatus and the potential adverse effects of selecting for resistant CMV with lower levels of prophylactic dosing of ganciclovir, we would favor starting induction therapy dosing for ganciclovir (HD dosing).  With respect to abscesses, he is s/p I&D of smaller daniella-rectal abscess on 8/12.  Planning for OR today for drainage of abscess and likely sigmoid colectomy and colostomy.     2.  Pseudomonas aeruginosa bacteremia associated with a hemodialysis catheter that was retained: History of positive blood cultures from 7/26 but by report, there have not been more recent positive cultures since then.  There is certainly a risk that he may have recrudescence of bacteremia as result of biofilm formation on the line that was retained.  However, at present, the cefepime would be suppressing growth as he has not been off antibiotics for any period of time.  He has not had evidence of breakthrough bacteremia as assessed by blood cultures from 8/10 as well as more recent blood cultures drawn during this admission.  Because of his intra-abdominal abscesses, we expect that he will continue to be on an agent active against Pseudomonas for some time.  Due to risk of colonization of the line, we would recommend surveillance blood cultures in the future with timing to be determined based on his final duration of antibiotic therapy but anticipated to be 1 and 2 weeks after cessation of any antibiotics  active against Pseudomonas.     3. New small (3mm and 5mm) pulmonary nodules: Would repeat CT chest in 4-6 weeks.  4. Oral ulcer: Viral testing during last admission was negative.  Wound culture grew Pseudomonas in addition to oral tiffany.  Have not seen previously but improving by patient report.     Other:  - QTc interval: 451ms 7/26/18  - Viral serostatus & prophylaxis: HSV1-, HSV2+, CMV D+/R-, EBV D?/R+, VZV+.  Valganciclovir held since 7/20 due to neutropenia.  - PJP: Pentamidine but favor switch back to bactrim  - Isolation status: enteric     Staffed with Dr. Lawrence.  Patient will continue to be followed by Dr. Lawrence and myself on the HSCT transplant service for continuity of care.     Juliana Alexander MD, PhD  Adult & Pediatric Infectious Diseases Fellow PGY7, [facility]  Pager: [phone]        Interval History:   Afebrile since admission. Having more abdominal pain today.  Dialyzing today.  Passing some soft black stools and pink tissue but no further gross blood.  Anti-infectives:  Current  Cefepime 7/26-present  IV metronidazole 8/12-present  PO vancomycin 8/13-present    Transplants:  6/14/2018 (Heart), Postoperative day:  61.  Coordinator Britni Mcknight    Immunosuppression: Mycophenolate 500 mg twice daily, tacrolimus 1 mg every morning and 2 mg every afternoon prior trough goal had been 10-12 but currently targeting 8.  Prednisone 15 mg twice daily           Current Medications & Allergies:       [Auto Hold] acetaminophen  1,000 mg Oral 4x Daily     [Auto Hold] biotin  5 mg Oral Daily     [Auto Hold] ceFEPIme (MAXIPIME) IV  1 g Intravenous Q24H     [Auto Hold] fluticasone  1-2 spray Both Nostrils Daily     [Auto Hold] ganciclovir (CYTOVENE) intermittent infusion  1.25 mg/kg Intravenous Once per day on Mon Wed Fri     [Auto Hold] insulin aspart  1-6 Units Subcutaneous Q4H     [Auto Hold] lipids  250 mL Intravenous Q24H     [Auto Hold] metroNIDAZOLE  500 mg Intravenous Q8H     [Auto Hold]  "mycophenolate  500 mg Oral BID IS     [Auto Hold] nystatin  1,000,000 Units Swish & Swallow 4x Daily     [Auto Hold] pantoprazole  40 mg Intravenous Q24H     [Auto Hold] predniSONE  10 mg Oral BID     [Auto Hold] rosuvastatin  20 mg Oral Daily     [Auto Hold] saccharomyces boulardii  250 mg Oral BID     [Auto Hold] sodium chloride (PF)  3 mL Intracatheter Q8H     [Auto Hold] tacrolimus  1 mg Oral QAM     [Auto Hold] tacrolimus  2 mg Oral QPM     [Auto Hold] triamcinolone   Topical BID     [Auto Hold] vancomycin  125 mg Oral 4x Daily       Infusions/Drips:    IV fluid REPLACEMENT ONLY       - MEDICATION INSTRUCTIONS -       parenteral nutrition - ADULT compounded formula       Patient RECEIVING antibiotic to treat a different condition and it provides ADEQUATE COVERAGE for this surgical procedure.       Patient RECEIVING antibiotic to treat a different condition and it provides ADEQUATE COVERAGE for this surgical procedure.       sodium chloride         Allergies   Allergen Reactions     Norco [Hydrocodone-Acetaminophen] Nausea and Vomiting     Cats      Throat tightness     Isosorbide Other (See Comments)     hypotension     Penicillins Hives     Seasonal Allergies      rhinitis     Shrimp      Throat closes             Physical Exam:   Vitals were reviewed.    BP (!) (P) 142/99  Pulse 70  Temp 98.2  F (36.8  C) (Oral)  Resp 16  Ht 1.753 m (5' 9\")  Wt 78.8 kg (173 lb 11.6 oz)  SpO2 100%  BMI 25.65 kg/m2  Physical Examination:  GENERAL:  well-developed, well-nourished.  In dialysis.  HEENT:  Head is normocephalic, atraumatic   EYES:  Eyes have anicteric sclerae without conjunctival injection    LUNGS:  Clear to auscultation bilateral.   CARDIOVASCULAR:  Regular rate and rhythm with no murmurs.  Well-healed surgical scars.    ABDOMEN:  Active bowel sounds.  Increased tenderness over left upper quadrant today with less tenderness over lower abdomen.  SKIN:  No acute rashes.  NEUROLOGIC:  Grossly nonfocal. Active " x4 extremities  T/L/D: Tunneled dialysis catheter right chest.  No erythema or tenderness over tunnel.         Laboratory Data:   Creatinine 7.08  BUN 48  K 4.5  Bicarb 20     CRP 76 > 230  Procalcitonin 33.03 > 29.21     WBC 3.7  ANC [place] of 0.3 on 18; ANC consistently in normal range since 18 and currently 2.8  ALC 0.4  Hemoglobin 9.0  Platelets 103     Tacro trough 8.4 on .  Had supratherapeutic levels up to 24.7 on 18.     Microbiology   Blood                         18 dialysis catheter tip 2 strains CoNS                         7/26/18 x2 from [facility] Pseudomonas aeruginosa (Microbiology report obtained from [place] and copy placed in paper chart.  Pan-sensitive).                         7/26/18 x2 [facility] negative                         7/28/18 x2 negative                         8/10/18 ×2 NGTD                         18 ×3 NGTD     Wound                         18: Mouth wound with pansensitive pseudomonas aeruginosa and normal tiffany                         18 Mouth ulcer     Peritoneal fluid                         18 13 yellow fluid with 4736 WBCs, 80% neutrophils.  Gram stain with no organisms and many WBCs.  Culture with Candida glabrata and Bacteroides caccae.                         1/15/18: Peritoneal fluid with 4256 WBCs, 91% neutrophils.  Gram stain with many WBCs and no organisms seen.  Culture with Staphylococcus epidermidis, Candida glabrata    Enteric  Enteric LOGAN   18 negative  Giardia ag   18 negative  C diff   18 positive  Cryptosporidium ag   18 pending  Microsporidium ag   18 pending     Virology  CMV blood PCR                         18 negative                         18 negative  EBV blood PCR                         18 negative  Parvovirus blood PCR                          18 negative  VZV blood PCR                         18 negative     Imagin/26/18: CT CAP:  1. Findings suggestive of  mild infectious or inflammatory colitis of the ascending colon just proximal to the cecum. No pericolonic fluid collection.  2. Postoperative changes of cardiac transplant with no significant change in loculated retrosternal fluid and soft tissue stranding compared to 6/26/2018.  3. 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        8/12/18 CT abd/pelv:  1 a. Inflamed segment of distal sigmoid colon in the setting of diverticulosis, most likely diverticulitis. This is complicated by a pericolonic abscess measuring up to 7.8 cm, which abuts multiple loops of small bowel.   1 b. Inflamed loop of proximal sigmoid colon, most compatible with uncomplicated diverticulitis.  1 c. Inflammation of the right colon compatible with colitis, typhlitis is a consideration setting of neutropenia.  1 e. Unchanged inflammation of the proximal duodenum, with a chronic appearance. Peptic ulcer disease is a consideration.  2. Small perirectal abscess, measuring up to 2.7 cm.  3. New right lower lobe pulmonary nodules measuring 5 and 3 mm, respectively. Recommend short-term follow-up CT chest in 3 months.  4. Stable size of multiple stable pancreatic IPMNs.

## 2018-08-14 NOTE — PROGRESS NOTES
Cardiology 2 Progress Note           Assessment and Plan:   Murray Nicholson is a 63 year old male with a h/o NICM s/p heart txp (6/14/18), ESRD on HD, R internal jugular thrombus on apixaban and DM2 p/w 3-4 bright red BM and fevers/chills, found to have a daniella-colonic abscess (7.8 cm), daniella-rectal abscess (2.7 cm), probable diverticulitis, and R colon colitis.  Planned for daniella-rectal abscess drainage tonight, likely will require colectomy later this week pending discussion between colorectal and IR.    Changes today:  - Patient in dialysis this AM, likely going to OR after.  Not seen this AM, will follow up post-op.      # Daniella-colonic abscess (7.8 cm)  # Daniella-rectal abscess (2.7 cm) s/p drainage 8/12  # Probable diverticulitis  # R colon colitis  # Positive c. Diff  C diff positive, on PO vanc and IV flagyl.  Planned for OR today.  - Colorectal, ID, IR following, appreciate assistance    # S/p heart transplant 6/13/2018 (donor 3 vessel CAD  HSV1-, HSV1+, CMV D+/R-, EBV D?/R+, VZV+  tacro goal 8 due to infx, last heart bx 7/5/2018 was 0R  8/10/2018 bx results still pending)   - Lowered prednisone to 10/10 today given likely upcoming surgery  - Will continue tacro at 1/2 and MMF at 500 BID  - Continue statin  - Due for pentamadine 8/17  - RHC biopsy from 8/10 pending    # Recent pseudomonas bacteremia  # Necrotic mouth ulcer, resolving  Had profound neutropenia and pseudomonas bacteremia several weeks ago in associated with necrotic mouth ulcer; neutropenia likely immunosuppression induced.  Neutropenia resolved last admission and pseudomonal bacteremia is likely cleared.  Necrotic ulcer appears to be resolving from prior admission.  Not currently active issues but will continue to monitor.    # Pulmonary nodules  Incidental on CT, will need interval follow up in 4-6 weeks.    # Recent R internal jugular thrombus  - Holding apixaban in setting of pending surgery    # ESRD on TTS HD  - Nephrology consulted,  appreciate assistance    # DM2 - SSI    FEN: NPO  PPx: Heparin held for surgery; will likely resume subcutaneous this AM  Dispo: Pending surgical plan    Bobby Bearden MD PGY-3  Internal Medicine Resident  [phone]    Patient discussed with Dr. Blum.         Subjective:   Overnight no acute events per nursing.  Patient at dialysis.          Medications:       [Auto Hold] acetaminophen  1,000 mg Oral 4x Daily     [Auto Hold] biotin  5 mg Oral Daily     [Auto Hold] ceFEPIme (MAXIPIME) IV  1 g Intravenous Q24H     [Auto Hold] fluticasone  1-2 spray Both Nostrils Daily     [Auto Hold] ganciclovir (CYTOVENE) intermittent infusion  1.25 mg/kg Intravenous Once per day on Mon Wed Fri     [Auto Hold] insulin aspart  1-6 Units Subcutaneous Q4H     [Auto Hold] lipids  250 mL Intravenous Q24H     [Auto Hold] metroNIDAZOLE  500 mg Intravenous Q8H     [Auto Hold] mycophenolate  500 mg Oral BID IS     [Auto Hold] nystatin  1,000,000 Units Swish & Swallow 4x Daily     [Auto Hold] pantoprazole  40 mg Intravenous Q24H     [Auto Hold] predniSONE  10 mg Oral BID     [Auto Hold] rosuvastatin  20 mg Oral Daily     [Auto Hold] saccharomyces boulardii  250 mg Oral BID     [Auto Hold] sodium chloride (PF)  3 mL Intracatheter Q8H     [Auto Hold] tacrolimus  1 mg Oral QAM     [Auto Hold] tacrolimus  2 mg Oral QPM     [Auto Hold] triamcinolone   Topical BID     [Auto Hold] vancomycin  125 mg Oral 4x Daily       IV fluid REPLACEMENT ONLY       - MEDICATION INSTRUCTIONS -       parenteral nutrition - ADULT compounded formula       Patient RECEIVING antibiotic to treat a different condition and it provides ADEQUATE COVERAGE for this surgical procedure.       Patient RECEIVING antibiotic to treat a different condition and it provides ADEQUATE COVERAGE for this surgical procedure.       sodium chloride                 Objective:          Physical Exam:   Temp:  [97.6  F (36.4  C)-98.2  F (36.8  C)] 98.2  F (36.8  C)  Pulse:  [70]  70  Heart Rate:  [72-91] (P) 80  Resp:  [16] 16  BP: ()/() (P) 142/99  Cuff Mean (mmHg):  [] (P) 118  SpO2:  [99 %-100 %] 100 %  I/O last 3 completed shifts:  In: 915.84 [I.V.:350.84]  Out: 2500 [Other:2500]    Vitals:    08/12/18 0228 08/14/18 0500   Weight: 75 kg (165 lb 5.5 oz) 78.8 kg (173 lb 11.6 oz)       GEN: Deferred         Data:   BMP    Recent Labs  Lab 08/14/18  0620 08/13/18  0618 08/13/18  0121 08/12/18  1253    135 136 136   POTASSIUM 4.5 3.8 3.9 3.8   CHLORIDE 102 103 101 100   TRINI 7.5* 7.9* 7.8* 7.5*   CO2 20 24 23 25   BUN 48* 38* 34* 26   CR 7.08* 5.72* 5.27* 4.45*   * 40* 81 131*     LFTs    Recent Labs  Lab 08/14/18  0620 08/13/18  0618 08/08/18  0921   ALKPHOS 110 99 156*   AST 29 28 34   ALT 17 17 22   BILITOTAL 0.3 0.3 0.4   PROTTOTAL 5.3* 5.2*  --    ALBUMIN 1.6* 1.6*  --       CBC  Recent Labs  Lab 08/14/18  0620 08/13/18  0618  08/12/18  1253  08/12/18  0834   WBC 3.7* 5.2  --  4.8  --  4.9   RBC 3.11* 3.00*  --  2.52*  --  2.52*   HGB 9.0* 8.6*  < > 7.5*  < > 7.4*   HCT 27.7* 27.0*  --  23.7*  --  23.2*   MCV 89 90  --  94  --  92   MCH 28.9 28.7  --  29.8  --  29.4   MCHC 32.5 31.9  --  31.6  --  31.9   RDW 20.9* 21.0*  --  22.2*  --  22.0*   * 117*  --  111*  --  131*   < > = values in this interval not displayed.  INR    Recent Labs  Lab 08/12/18  0834 08/12/18  0230   INR 1.31* 1.25*

## 2018-08-14 NOTE — OR NURSING
Pt has a bag of belongings that will be sent back up to 6c. Bag contains wallet with $5 and 2 credit cards. Phone . Medications, medication cards, book. The house orderly will be bringing up the bag back to pt's room.

## 2018-08-14 NOTE — PROGRESS NOTES
"Inpatient Ostomy marking    History: 63 year old male h/o heart TXP 6/2018 on immunosuppressants, ESRD on HD, R IJ thrombus on apixabam (held, last dose 2d ago), T2DM, ongoing pseudomonal bacteremia p/w abd pain & BRBPR w/first episode of diverticulitis c/b paracolonic abscess seen on CT. CT reviewed, LLQ 8-cm paracolonic abscess. Appears to be surrounded by SB, and may have a component of intramural abscess with stool in its interior. Sigmoid appears to be moderately inflamed, but there is some colon wall inflammation involving the ascending colon as well. Co-incidentally there is a right-sided anorectal abscess as well. His anorectal exam confirmed this. Most likely diverticulitis but may also be perforated stercoral ulcer, CMV colitis, and ischemic colitis.     Patient's chart evaluated.      Assessments done today:  Belt line, previous abdominal surgeries and scars, abdominal muscles and pt observed lying, sitting and standing.  Pt has multiple scars and healing wounds from previous tube sites.    Education: Reviewed upcoming surgery, stoma construction, post-op expectations, general ostomy cares/concerns including: differenced between colostomy/ileostomy/ urostomy. Information packet provided on Colostomy information and living with a colostomy    All patient / family questions answered:  YES    Patient marked with \"x\" using surgical marker and sealed with 3M No Sting Skin Prep. Pt marked Left lower Quadrant      Will plan to follow patient post operatively.  8/14      Concerns: Pt apprehensive about having procedure and wants conservative measures first.         "

## 2018-08-14 NOTE — ANESTHESIA PROCEDURE NOTES
Peripheral Nerve Block Procedure Note    Staff:     Anesthesiologist:  BAILEY BERRIOS    Resident/CRNA:  OSMIN CHAPMAN    Block performed by resident/CRNA in the presence of a teaching physician    Location: OR AFTER induction  Procedure Start/Stop TImes:      8/14/2018 12:45 PM     8/14/2018 12:57 PM    patient identified, IV checked, site marked, risks and benefits discussed, informed consent, monitors and equipment checked, pre-op evaluation, at physician/surgeon's request and post-op pain management      Correct Patient: Yes      Correct Position: Yes      Correct Site: Yes      Correct Procedure: Yes      Correct Laterality:  Yes    Site Marked:  Yes  Procedure details:     Procedure:  TAP    ASA:  3    Diagnosis:  Post op pain control    Laterality:  Bilateral    Position:  Supine    Sterile Prep: chloraprep      Local skin infiltration:  None    Needle:  Short bevel    Needle gauge:  21    Needle length (inches):  3.1    Ultrasound: Yes      Ultrasound used to identify targeted nerve, plexus, or vascular structure and placed a needle adjacent to it      Permanent Image entered into patiient's record      Abnormal pain on injection: No      Blood Aspirated: No      Paresthesias:  No    Bleeding at site: No      Test dose negative for signs of intravascular injection: Yes      Bolus via:  Needle    Infusion Method:  Single Shot    Blood aspirated via catheter: No      Complications:  None  Assessment/Narrative:     Injection made incrementally with aspirations every (mL):  5     Left subcostal with in-plane dissection posterior and right classical TAP

## 2018-08-14 NOTE — PROGRESS NOTES
HEMODIALYSIS TREATMENT NOTE    Date: 8/14/2018  Time: 11:13 AM    Data:  Pre Wt: 78.8 kg (173 lb 11.6 oz)   Desired Wt: 76 kg   Post Wt: 76.3 kg (168 lb 3.4 oz)  Weight gain: -2.5 kg off  Weight change: 2.5 kg off  Ultrafiltration - Post Run Net Total Removed (mL): 2500 mL  Ultrafiltration - Post Run Net Total Gain (mL): 0 mL  Vascular Access Status: Yes, secured and visible  Dialyzer Rinse: Clear, Streaked  Total Blood Volume Processed: 57.2 L  Total Dialysis (Treatment) Time: 3 hrs     Lab:    NO    Assessment:  Patent Cath. Mild edema on face and lower extrimities.    Interventions:  No heparin via HD machine. Initiated HD via Rt CVC line.BFR at 350 ml /mins.Decreased 4 hrs HD Tx Time to 3 hrs d/t surgery at noon.Checked B/S 122 at 9:30. Not tolerable for 2.8 kg off d/t soft BP.Cooling HD Machine To 36C and Decreased UF goal to 2.5 kg then get better BP.Notifed NP.  Floor RN gave him pre- OP meds and Pre-op skin prep for surgery. Finished HD with rinse back and CVC locked with NS and [place] cap applied.  Gave report to 6C floor RN       Plan:    Send patient to OR.

## 2018-08-14 NOTE — PROGRESS NOTES
No return call from the patients son to get an updated contact number [facility] letter mailed     Jaky Canela RN / Clinical Care Coordinator     [facility]   [email or web address]  Office :  [phone] / Fax :  [phone]

## 2018-08-14 NOTE — ANESTHESIA CARE TRANSFER NOTE
Patient: Murray Nicholson    Procedure(s):  Laparoscopic Hand Assisted Takedown of Splenic Flexure, Sigmoidectomy, Small Bowel Resection, Takedown of Small Bowel to Colon Fistula - Wound Class: II-Clean Contaminated    Diagnosis: Sigmoid Diverticulitis   Diagnosis Additional Information: No value filed.    Anesthesia Type:   No value filed.     Note:  Airway :Nasal Cannula  Patient transferred to:PACU  Comments: Spont resp, VSS, report to RNHandoff Report: Identifed the Patient, Identified the Reponsible Provider, Reviewed the pertinent medical history, Discussed the surgical course, Reviewed Intra-OP anesthesia mangement and issues during anesthesia, Set expectations for post-procedure period and Allowed opportunity for questions and acknowledgement of understanding      Vitals: (Last set prior to Anesthesia Care Transfer)    CRNA VITALS  8/14/2018 1613 - 8/14/2018 1653      8/14/2018             NIBP: (!)  140/93    Pulse: 78    Temp: 36.7  C (98  F)    SpO2: 100 %    Resp Rate (observed): 16    EKG: Sinus rhythm                Electronically Signed By: VERONICA Fitch CRNA  August 14, 2018  4:53 PM

## 2018-08-14 NOTE — OP NOTE
Procedure Date: 08/14/2018.      PREOPERATIVE DIAGNOSES:   1. 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   2. Anorectal abscess (status post recent incision and drainage).   3. Pseudomonas bacteremia.   4. Gastroesophageal reflux disease.   5. End-stage renal disease (on hemodialysis).   6. Dyslipidemia.   7. Hypertension.   8. Hypertensive cardiomyopathy (status post recent heart transplantation).   9. Iron deficiency and chronic disease anemia.   10. Diabetes mellitus type 2.   11. Clostridium difficile colitis.   12. Proteocaloric malnutrition.      POSTOPERATIVE DIAGNOSES:    1.  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   2.  Anorectal abscess (status post recent incision and drainage).   3.  Pseudomonas bacteremia.   4.  Gastroesophageal reflux disease.   5.  End-stage renal disease (on hemodialysis).   6.  Dyslipidemia.   7.  Hypertension.   8.  Hypertensive cardiomyopathy (status post recent heart transplantation).   9.  Iron deficiency and chronic disease anemia.   10.  Diabetes mellitus type 2.   11.  Clostridium difficile colitis.   12.  Proteocaloric malnutrition.      ANESTHESIA:  General endotracheal anesthesia plus bilateral TAP blocks.      PROCEDURES PERFORMED:   1. Hand-assisted laparoscopic takedown of splenic flexure.   2. Hand-assisted laparoscopic sigmoidectomy with end colostomy.   3. Hand-assisted laparoscopic small bowel resection.   4. Hand-assisted laparoscopic takedown of a fistula from small bowel to colon.      SURGEON:  Rick Tran MD.      ASSISTANTS:  Julita Robledo MD (Colon and Rectal Surgery fellow); and Dianne Chen MD (General Surgery resident).      BRIEF HISTORY:  Murray is a 63-year-old gentleman with extensive past medical history; most importantly, recent heart transplantation with end-stage renal disease on hemodialysis.  He was admitted to the hospital approximately 4-5 days ago with abdominal pain and Pseudomonas bacteremia.  A  "CT scan of the abdomen and pelvis showed sigmoid diverticulitis with 2 associated abscesses, as well as an anorectal abscess.  He underwent incision and drainage of his anorectal abscess on 08/12/18 and he tolerated this well.  Interventional Radiology was consulted and they thought there was no safe window to drain his diverticular abscess safely.  Given this as well as ongoing abdominal pain we discussed the role of operative treatment versus IV antibiotics alone.  I did mention to him that I did not think that this infectious process would get significantly better with IV antibiotics alone.  His surgery was going to involve likely sigmoidectomy with an end colostomy, given his past medical history.  I thoroughly discussed the risks, benefits and alternatives of operative treatment with Murray and he agreed to proceed.      DESCRIPTION OF PROCEDURE:  After obtaining informed consent, the patient was brought to the Operating Room and placed in the modified lithotomy position.  General endotracheal anesthesia was gently induced without difficulty.  The patient underwent preoperative placement of bilateral TAP blocks.  He also received appropriate preoperative antibiotic prophylaxis as well as mechanical and chemical DVT prophylaxis.  Bilateral lower extremity pneumatic compression devices were applied and all pressure points were cushioned.  A Dobbins catheter was inserted.  The abdomen was prepped and draped in the standard sterile fashion.  A \"timeout\" was performed.  A 5 cm lower midline incision was placed and the peritoneal cavity was entered under direct vision.  There was no evidence of any immediate purulent or feculent peritonitis.  A hand port was then placed at this incision.  A 12 mm supraumbilical incision was placed and a 12 mm trocar was placed in this incision without difficulty.  Pneumoperitoneum was established up to 15 mmHg without difficulty.  Upon exploration of the peritoneal cavity there was no " evidence of any feculent or purulent peritonitis.  The greater omentum was adhered to the sigmoid colon and this was taken down with a Harmonic scalpel.  There were 2 separate segments of small intestine that were attached to the mid sigmoid colon, adjacent to the mesentery.  This is where the larger of the 2 abscesses was located.  One segment of small bowel was dissected free with sharp dissection.  The small intestine was evaluated and there was no evidence of fistula at this site.  The other segment of small intestine was densely adhered to the abscess cavity and this was very carefully dissected free with sharp dissection.  The GelPort was then taken down at this time and the small bowel was brought through this lower midline incision.  There was no evidence of any fistula from the first segment of ileum that was attached to the abscess cavity.  There was 1 small serosal tear and this was oversewn with multiple 4-0 silk Lembert sutures.  The other segment of small intestine showed a large abscess cavity rind with a clear fistula with puckering of the mucosa.  Given this finding, we decided to perform a bowel resection given that the bowel was not healthy enough to close the fistula primarily.  The mesentery to this segment of approximately 10 cm of mid to proximal jejunum was transected with a Harmonic scalpel.  The bowel was placed side-to-side and enterotomies were placed.  A GI stapler was used to perform a stapled side-to-side anastomosis  (blue load-100 mm).  The GI staple lines were then assessed and there was no evidence of bleeding.  The staple lines were offset and a TA 90 stapler (blue load) was used to excise the enterotomies and the specimen.  The specimen was sent for open end return and there was no evidence of any masses in the small intestine, but there was a clear fistula.  The TA staple line was oversewn with a 4-0 Monocryl horizontal mattress suture.  The corner of the GI staple line was  reinforced with 2 individual 4-0 silk sutures.  The small bowel anastomosis appeared to be healthy and widely patent.  There was absolutely no evidence of tension, torsion, or inadequate blood supply to the small bowel anastomosis.  The anastomosis was then returned to the peritoneal cavity.  Pneumoperitoneum was reinsufflated after replacing the GelPort.  The sigmoid colon appeared to have an abscess cavity that had perforated into the mesentery. Given this, we mobilized the entire sigmoid colon using a lateral to medial approach.  The left ureter was clearly identified and care was taken to not injure this structure during the dissection.  The dissection was carried up all the way to the splenic flexure and the entire splenic flexure was taken down with Harmonic scalpel.  There were additional omental attachments that were taken down as well.  After sufficient immobilization, pneumoperitoneum was desufflated and the sigmoid colon was delivered through the lower midline incision.  The junction between the descending colon and the sigmoid colon was transected with a NINFA stapler (100 mm-green load).  The mesentery of the entire sigmoid colon was transected with the Harmonic scalpel all the way to the rectosigmoid junction.  At this level, the bowel was then divided with another NINFA staple load (green load-100 mm).  The specimen was sent for open end return and there were 2 fistulas at the abscess cavity site, but no evidence of any masses in the colon.  Hemostasis was corroborated.  The rectal stump was oversewn with a running horizontal mattress 2-0 Prolene suture.  This suture was also used to tack the rectal stump up to the left pelvic sidewall.  The peritoneal cavity was then irrigated with a liter of warm sterile water and subsequently suctioned out.  There was no evidence of any spillage.  The small intestine was ran from the ileal cecum all the way to the ligament of Treitz and there was no evidence of any  further inflammation and our small bowel anastomosis appeared to be intact.  A circular incision was placed at the left upper quadrant at a previously marked site.  The subcutaneous tissue was dissected down to the level of the fascia and the fascia was incised with a cruciate incision.  The proximal staple line of the descending colon was then delivered through this incision.  There was absolutely no evidence of excessive tension or torsion on the segment of intestine proposed for an end colostomy.  We corroborated that there was adequate orientation going into the peritoneal cavity of the descending colon.  Hemostasis was corroborated.  Instrument, sponge and needle counts were all correct as reported to me.  Multiple sheets of Seprafilm were left in the peritoneal cavity.  The supraumbilical trocar site was closed at the fascial level with a figure-of-eight 0 Vicryl suture.  The lower midline incision was closed at the fascial level with multiple figure-of-eight #1 Prolene sutures.  The wounds were irrigated with dilute peroxide.  An additional 20 mL of bupivacaine 0.25% without epinephrine was injected at the lower midline incision.  The lower midline incision was reapproximated at the deep dermal level with a running 3-0 Vicryl suture.  All skin incisions were reapproximated with running 4-0 Monocryl subcuticular sutures and Dermabond Prineo.  The segment of descending colon that was brought up through the left upper quadrant incision was matured as an end colostomy with multiple 3-0 Vicryl sutures.  A sterile stoma appliance was then placed.  The patient tolerated the procedure well.      COMPLICATIONS:  None immediately.      ESTIMATED BLOOD LOSS:  150 mL.      REPLACEMENT:  500 mL of crystalloid, 1 unit of packed red blood cells, and 1 unit of platelets.      DRAINS AND TUBES:  Dobbins catheter.      SPECIMENS:  Abdominal cultures (aerobic and anaerobic), small bowel resection with fistula, sigmoidectomy.       FINDINGS:  Two sigmoid colon abscesses with dense small bowel adhesions and 2 small bowel fistulas.  This required additional small bowel resection with primary anastomosis.      DISPOSITION:  PACU.         ELEANOR HANNAH MD             D: 2018   T: 2018   MT: RAJESH      Name:     SANCHEZ MCNEIL   MRN:      [id number]        Account:        [id number]   :      1955           Procedure Date: 2018      Document: [id number]       cc: Yahir Blum MD       [facility] Surgery Benito Lawrence MD

## 2018-08-14 NOTE — PLAN OF CARE
Problem: Surgery Nonspecified (Adult)  Goal: Signs and Symptoms of Listed Potential Problems Will be Absent, Minimized or Managed (Surgery Nonspecified)  Signs and symptoms of listed potential problems will be absent, minimized or managed by discharge/transition of care (reference Surgery Nonspecified (Adult) CPG).   Shift: 2849-1447  Neuro: A&Ox4, intact.   Cardiac: Afebrile, VSS. Denies CP. NSR.    Respiratory: sating 100% on Ra.   GI/: Anuric, on HD. 1 BM during shift.   Diet/appetite: NPO at MN. Denies nausea.  Activity: Independent.  Pain: Denies.   Skin: No new deficits noted. TRUONG I&D site, pt wont let staff assess.   Lines: 2 PIV.   Drains: None.     Pt has been sleeping in between cares. Checking BG Q2-3H. BG 87-110s. Gave apple juice with sugar for BG at 87. D10 infusing at 10ml/hr to help with maintaining BG. Possible colostomy procedure today. HD today potentially. Calls appropriately and makes needs known. Will continue to monitor and follow POC.

## 2018-08-14 NOTE — PROGRESS NOTES
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 Last HD 8/11/18.        ASSESSMENT AND RECOMMENDATIONS:       1. ESRD - Etiology of ESRD 2/2 HTN, DM, CRS.   Has chronic OP HD at [facility], TTS schedule, under care of Dr Mcpherson.    - Dialysis orders: Right TDC, EDW 76 kg, Run time 4 hrs   - Will continue TTS schedule while inpatient      2. Volume status - Euvolemic. No edema/dyspnea. Pre run -160/. No urine recorded. Pre run weight 78.8 kg.    - Only able to remove 2.5 kg today.    - EDW 76 kg   - Continue pre run weights   - Strict I/O      3. HTN - Pre run blood pressures 140-160/. Post run 120's/. Dropped into the 90's/ during the run. OP antihypertensive regimen:  Clonidine 0.1 mg HS, Hydralazine 50 mg QID, Lisinopril 20 mg every day   - All currently on hold      4. Transplant IS regimen: Tac, MMF, Pred. TAC 8.4      5. Electrolytes - No acute concerns. K 4.5, Na 133      6. Acid base - Pre run Bicarb 20 due to CKD and ongoing diarrhea   - Will correct on HD      7. BMD - corrected Ca 9.6, Mg 2.5, Phos 5.7, albumin 1.6   - Vit D/PTH being managed in OP HD unit      8. Anemia - Hgb 9.0. On Epo 7600 q run      9. Pericolonic abscess, small perircal abscess 2.7 cm.- Underwent I/D anorectal abscess on 8/13. Going to OR today for Sigmoidectomy/end colostomy. Also has C diff. Current abx: Po Vanco, Flagyl, Maxipime and Ganciclovir.    - BC neg, WBC 3.7, afebrile      10. Lung nodules - New Right lower lobe pulmonary nodules seen on CT this admission. ID recommending close f/u.       Recommendations were communicated to  "primary team via progress note      Patricia Cook, NP   [phone]     Interval History :     Seen on HD today  Mild hypotension limited UF to goal this morning  Scheduled for lap assisted sigmoid colectomy w/end colostomy today.   Having ongoing liquid stools  Interval progress notes, imaging studies and labs reviewed     Review of Systems:   I reviewed the following systems:  GI: NPO  Neuro:  no confusion  Constitutional:  no fever or chills  CV: denies dyspnea, CP or edema.   : Minimal urine    Physical Exam:   I/O last 3 completed shifts:  In: 125.84 [I.V.:125.84]  Out: -    BP (!) (P) 142/99  Pulse 70  Temp 98.2  F (36.8  C) (Oral)  Resp 16  Ht 1.753 m (5' 9\")  Wt 78.8 kg (173 lb 11.6 oz)  SpO2 100%  BMI 25.65 kg/m2     GENERAL APPEARANCE: comfortable on HD.   EYES: no scleral icterus, pupils equal  Pulmonary: lungs CTA. Breathing is non labored.   CV: RRR   - Edema - none  GI: soft, non distended, mild tenderness  MS: no evidence of inflammation in joints, no muscle tenderness  : no doyle  SKIN: no rash, warm, dry, no cyanosis  NEURO: alert/oriented, has bilateral hand tremors  ACCESS: Right TDC    Labs:   All labs reviewed by me  Electrolytes/Renal -   Recent Labs   Lab Test  08/14/18   0620  08/13/18   0618  08/13/18   0121   08/12/18   0834   08/10/18   1104   NA  133  135  136   < >  138   < >  135   POTASSIUM  4.5  3.8  3.9   < >  3.5   < >  4.8   CHLORIDE  102  103  101   < >  101   < >  102   CO2  20  24  23   < >  25   < >  24   BUN  48*  38*  34*   < >  24   < >  32*   CR  7.08*  5.72*  5.27*   < >  4.01*   < >  4.87*   GLC  106*  40*  81   < >  108*   < >  220*   TRINI  7.5*  7.9*  7.8*   < >  7.4*   < >  7.7*   MAG  2.5*  2.5*  2.5*   --   1.4*   --   1.6   PHOS  5.7*   --    --    --   2.6   --   2.8    < > = values in this interval not displayed.       CBC -   Recent Labs   Lab Test  08/14/18   0620  08/13/18   0618  08/13/18   0121  08/12/18   1253   WBC  3.7*  5.2   --   4.8   HGB  " 9.0*  8.6*  9.0*  7.5*   PLT  103*  117*   --   111*       LFTs -   Recent Labs   Lab Test  08/14/18   0620  08/13/18   0618  08/08/18   0921  07/29/18   0523   ALKPHOS  110  99  156*  106   BILITOTAL  0.3  0.3  0.4  0.4   ALT  17  17  22  21   AST  29  28  34  26   PROTTOTAL  5.3*  5.2*   --   5.2*   ALBUMIN  1.6*  1.6*   --   1.9*       Iron Panel -   Recent Labs   Lab Test  07/10/18   0711  05/08/18   0954  07/19/17   1306   IRON  66  58  46   IRONSAT  28  27  18   ALBERTINA  771*  621*  369       Current Medications:    [Auto Hold] acetaminophen  1,000 mg Oral 4x Daily     [Auto Hold] biotin  5 mg Oral Daily     bupivacaine liposome  20 mL Infiltration DURING SURGERY     [Auto Hold] ceFEPIme (MAXIPIME) IV  1 g Intravenous Q24H     [Auto Hold] fluticasone  1-2 spray Both Nostrils Daily     [Auto Hold] ganciclovir (CYTOVENE) intermittent infusion  1.25 mg/kg Intravenous Once per day on Mon Wed Fri     [Auto Hold] insulin aspart  1-6 Units Subcutaneous Q4H     [Auto Hold] lipids  250 mL Intravenous Q24H     [Auto Hold] metroNIDAZOLE  500 mg Intravenous Q8H     [Auto Hold] mycophenolate  500 mg Oral BID IS     [Auto Hold] nystatin  1,000,000 Units Swish & Swallow 4x Daily     [Auto Hold] pantoprazole  40 mg Intravenous Q24H     [Auto Hold] predniSONE  10 mg Oral BID     [Auto Hold] rosuvastatin  20 mg Oral Daily     [Auto Hold] saccharomyces boulardii  250 mg Oral BID     [Auto Hold] sodium chloride (PF)  3 mL Intracatheter Q8H     [Auto Hold] tacrolimus  1 mg Oral QAM     [Auto Hold] tacrolimus  2 mg Oral QPM     [Auto Hold] triamcinolone   Topical BID     [Auto Hold] vancomycin  125 mg Oral 4x Daily       IV fluid REPLACEMENT ONLY       - MEDICATION INSTRUCTIONS -       parenteral nutrition - ADULT compounded formula       Patient RECEIVING antibiotic to treat a different condition and it provides ADEQUATE COVERAGE for this surgical procedure.       Patient RECEIVING antibiotic to treat a different condition and it  provides ADEQUATE COVERAGE for this surgical procedure.       sodium chloride       Patricia NELSON Joey, NP

## 2018-08-14 NOTE — PLAN OF CARE
"Problem: Patient Care Overview  Goal: Plan of Care/Patient Progress Review  Outcome: No Change  BP (!) 156/101 (BP Location: Left arm)  Pulse 80  Temp 97.6  F (36.4  C) (Oral)  Resp 16  Ht 1.753 m (5' 9\")  Wt 75 kg (165 lb 5.5 oz)  SpO2 99%  BMI 24.42 kg/m2  Neuro: A&Ox4.   Cardiac:VSS. BP elevated,SR  Respiratory: RA .  GI/: Voiding spontaneously. No BM this shift.   Diet/appetite: NPO.Denies nausea . Running low BS, started on D10 @ 10 ml/hr, Will continue to monitor closely.  Activity: Up independently.    Pain: Agreeable to current regimen.  Skin: Intact, no new deficits noted. Rectal surgical site from I&D ,patient wont allow staff to assess.  Lines: PIV X2  Drains: None    Plan for dialysis early in am and thereafter surgery with colorectal for noon. Surgical scrub done.Pt has been resting comfortably throughout night, will continue to monitor and follow plan of care.           "

## 2018-08-14 NOTE — BRIEF OP NOTE
[facility], [place]    Brief Operative Note    Pre-operative diagnosis: Sigmoid Diverticulitis   Post-operative diagnosis Sigmoid diverticulitis, jejunocolonic fistula  Procedure: Procedure(s):  Laparoscopic Hand Assisted Takedown of Splenic Flexure, Sigmoidectomy, Small Bowel Resection, Takedown of Small Bowel to Colon Fistula - Wound Class: II-Clean Contaminated  Surgeon: Surgeon(s) and Role:     * Rick Tran MD - Primary     * Julita Robledo MD - Resident - Assisting     * Dianne Chen MD - Resident - Assisting  Anesthesia: Combined General with Block   Estimated blood loss: 150 mL  Drains: None  Specimens:   ID Type Source Tests Collected by Time Destination   1 : Abdominal Culture Other (specify in comments) Abdomen ANAEROBIC BACTERIAL CULTURE, FLUID CULTURE AEROBIC BACTERIAL Rick Tran MD 8/14/2018  3:17 PM    A : small bowel resection with fistula Tissue Other SURGICAL PATHOLOGY EXAM Rick Tran MD 8/14/2018  2:27 PM    B : Sigmoidectomy Tissue Colon SURGICAL PATHOLOGY EXAM Rick Tran MD 8/14/2018  3:06 PM      Findings:   proximal jejunal loop densely adherent to sigmoid colon abscess cavity with fistulous conection, terminal ileum also adherent to sigmoid colon without evidence of fistula, paracolonic abscess cavity, grossly normal colon proximal to sigmoid and normal rectum.  Complications: None.  Implants: None.

## 2018-08-14 NOTE — ANESTHESIA PREPROCEDURE EVALUATION
Physical Exam  Normal systems: cardiovascular and pulmonary    Airway   Mallampati: II  TM distance: >3 FB  Neck ROM: full    Dental     Cardiovascular       Pulmonary                     Anesthesia Plan      History & Physical Review  History and physical reviewed and following examination; no interval change.    ASA Status:  3 .    NPO Status:  > 6 hours    Plan for General with Intravenous induction. Maintenance will be Inhalation.    PONV prophylaxis:  Ondansetron (or other 5HT-3)  Additional equipment: 2nd IV      Postoperative Care  Postoperative pain management:  Multi-modal analgesia.      Consents  Anesthetic plan, risks, benefits and alternatives discussed with:  Patient.  Use of blood products discussed: Yes.   Use of blood products discussed with Patient.  Consented to blood products.  .                          .

## 2018-08-14 NOTE — ANESTHESIA POSTPROCEDURE EVALUATION
Patient: Murray Nicholson    Procedure(s):  Laparoscopic Hand Assisted Takedown of Splenic Flexure, Sigmoidectomy, Small Bowel Resection, Takedown of Small Bowel to Colon Fistula - Wound Class: II-Clean Contaminated    Diagnosis:Sigmoid Diverticulitis   Diagnosis Additional Information: No value filed.    Anesthesia Type:  No value filed.    Note:  Anesthesia Post Evaluation    Patient location during evaluation: PACU  Patient participation: Able to fully participate in evaluation  Level of consciousness: awake  Pain management: adequate  Airway patency: patent  Cardiovascular status: acceptable  Respiratory status: acceptable  Hydration status: acceptable  PONV: none             Last vitals:  Vitals:    08/14/18 1700 08/14/18 1715 08/14/18 1730   BP: (!) 146/91 (!) 152/107 (!) 152/96   Pulse:      Resp: 16 14 12   Temp: 36.5  C (97.7  F)     SpO2: 100% 100% 100%         Electronically Signed By: Son Flaherty DO  August 14, 2018  5:45 PM

## 2018-08-14 NOTE — PROGRESS NOTES
Colorectal Surgery Progress Note  POD#2    Subjective:  Afebrile. Continues to be hypertensive. Hypoglycemia resolved. Denies abdominal pain and perianal pain improved. Having bloody stools overnight. Shower yesterday and gelfoam from OR removed from I&D site. Refused miralax overnight.     Vitals:  Vitals:    08/13/18 1610 08/13/18 1930 08/14/18 0000 08/14/18 0020   BP: (!) 161/105 (!) 156/101  (!) 151/96   BP Location:  Left arm  Right arm   Pulse:   70    Resp: 16 16  16   Temp: 97.8  F (36.6  C) 97.6  F (36.4  C)  97.9  F (36.6  C)   TempSrc:  Oral  Oral   SpO2: 98% 99%  100%   Weight:       Height:           I/O:  I/O last 3 completed shifts:  In: 443.84 [P.O.:60; I.V.:383.84]  Out: -     Physical Exam:  Gen: AAOx3, NAD  Pulm: Non-labored breathing on room air.  Abd: Soft, non-distended, LLQ tenderness, no rebound/guarding   Perianal rectal abscess with clean base, no bleeding or significant drainage. Gelfoam removed.   Ext:  Warm and well-perfused    BMP  Recent Labs  Lab 08/13/18  0618 08/13/18  0121 08/12/18  1253 08/12/18  0834  08/10/18  1104 08/08/18  0921    136 136 138  < > 135 132*   POTASSIUM 3.8 3.9 3.8 3.5  < > 4.8 5.1   CHLORIDE 103 101 100 101  < > 102 99   CO2 24 23 25 25  < > 24 22   BUN 38* 34* 26 24  < > 32* 38*   CR 5.72* 5.27* 4.45* 4.01*  < > 4.87* 5.01*   GLC 40* 81 131* 108*  < > 220* 134*   MAG 2.5* 2.5*  --  1.4*  --  1.6 1.7   PHOS  --   --   --  2.6  --  2.8 3.1   < > = values in this interval not displayed.  CBC    Recent Labs  Lab 08/13/18  0618 08/13/18  0121 08/12/18  1253 08/12/18  1010 08/12/18  0834 08/12/18  0230   WBC 5.2  --  4.8  --  4.9 4.7   HGB 8.6* 9.0* 7.5* 7.0* 7.4* 8.2*   HCT 27.0*  --  23.7*  --  23.2* 25.8*   *  --  111*  --  131* 136*           ASSESSMENT:   63 year old male h/o heart TXP 6/2018 on immunosuppressants, ESRD on HD, R IJ thrombus on apixabam (held, last dose 2d ago), T2DM, ongoing pseudomonal bacteremia p/w abd pain & BRBPR w/first  episode of diverticulitis c/b paracolonic abscess seen on CT. CT reviewed, LLQ 8-cm paracolonic abscess. Appears to be surrounded by SB, and may have a component of intramural abscess with stool in its interior. Sigmoid appears to be moderately inflamed, but there is some colon wall inflammation involving the ascending colon as well. Co-incidentally there is a right-sided anorectal abscess as well. His anorectal exam confirmed this. Most likely diverticulitis but may also be perforated stercoral ulcer, or complication of CMV colitis, C Diff colitis or ischemic colitis.     - Plan for OR today for laparoscopic assisted sigmoid colectomy with end colostomy. Patient marked by stoma nurse.   - NPO. IVF. Continue cefepime and flagyl for intra-abdominal contamination.   - Continue to hold anticoagulation.   - Will need stress dose steroids in OR. Will discuss with anesthesia and primary appropriate dosing.   - Likely transfer to Step-down or Cardiac ICU pending stability in OR for close post-operative monitoring.     - Continue PO Vanco for Cdiff    Appreciate cares by primary team. Colorectal will continue to follow. Please call with any questions or concerns.     Julita Robledo MD   Colon and Rectal Surgery Fellow  8/14/2018 at 6:47 AM    Patient was seen with team and discussed with staff.

## 2018-08-15 LAB
ANION GAP SERPL CALCULATED.3IONS-SCNC: 10 MMOL/L (ref 3–14)
BACTERIA SPEC CULT: NORMAL
BASOPHILS # BLD AUTO: 0 10E9/L (ref 0–0.2)
BASOPHILS NFR BLD AUTO: 0 %
BUN SERPL-MCNC: 30 MG/DL (ref 7–30)
CALCIUM SERPL-MCNC: 7.8 MG/DL (ref 8.5–10.1)
CHLORIDE SERPL-SCNC: 99 MMOL/L (ref 94–109)
CO2 SERPL-SCNC: 25 MMOL/L (ref 20–32)
CREAT SERPL-MCNC: 5.2 MG/DL (ref 0.66–1.25)
DIFFERENTIAL METHOD BLD: ABNORMAL
EOSINOPHIL # BLD AUTO: 0 10E9/L (ref 0–0.7)
EOSINOPHIL NFR BLD AUTO: 0 %
ERYTHROCYTE [DISTWIDTH] IN BLOOD BY AUTOMATED COUNT: 20.3 % (ref 10–15)
GFR SERPL CREATININE-BSD FRML MDRD: 11 ML/MIN/1.7M2
GLUCOSE BLDC GLUCOMTR-MCNC: 165 MG/DL (ref 70–99)
GLUCOSE BLDC GLUCOMTR-MCNC: 49 MG/DL (ref 70–99)
GLUCOSE BLDC GLUCOMTR-MCNC: 59 MG/DL (ref 70–99)
GLUCOSE BLDC GLUCOMTR-MCNC: 64 MG/DL (ref 70–99)
GLUCOSE BLDC GLUCOMTR-MCNC: 69 MG/DL (ref 70–99)
GLUCOSE BLDC GLUCOMTR-MCNC: 71 MG/DL (ref 70–99)
GLUCOSE BLDC GLUCOMTR-MCNC: 71 MG/DL (ref 70–99)
GLUCOSE BLDC GLUCOMTR-MCNC: 79 MG/DL (ref 70–99)
GLUCOSE BLDC GLUCOMTR-MCNC: 93 MG/DL (ref 70–99)
GLUCOSE BLDC GLUCOMTR-MCNC: 94 MG/DL (ref 70–99)
GLUCOSE BLDC GLUCOMTR-MCNC: 96 MG/DL (ref 70–99)
GLUCOSE SERPL-MCNC: 79 MG/DL (ref 70–99)
HCT VFR BLD AUTO: 33.7 % (ref 40–53)
HGB BLD-MCNC: 11 G/DL (ref 13.3–17.7)
IMM GRANULOCYTES # BLD: 0 10E9/L (ref 0–0.4)
IMM GRANULOCYTES NFR BLD: 0.9 %
LYMPHOCYTES # BLD AUTO: 0.4 10E9/L (ref 0.8–5.3)
LYMPHOCYTES NFR BLD AUTO: 8.4 %
MAGNESIUM SERPL-MCNC: 1.9 MG/DL (ref 1.6–2.3)
MCH RBC QN AUTO: 29.2 PG (ref 26.5–33)
MCHC RBC AUTO-ENTMCNC: 32.6 G/DL (ref 31.5–36.5)
MCV RBC AUTO: 89 FL (ref 78–100)
MICROSPORID STL TRI STN: NORMAL
MONOCYTES # BLD AUTO: 0.3 10E9/L (ref 0–1.3)
MONOCYTES NFR BLD AUTO: 6.3 %
NEUTROPHILS # BLD AUTO: 3.6 10E9/L (ref 1.6–8.3)
NEUTROPHILS NFR BLD AUTO: 84.4 %
NRBC # BLD AUTO: 0 10*3/UL
NRBC BLD AUTO-RTO: 0 /100
PHOSPHATE SERPL-MCNC: 5 MG/DL (ref 2.5–4.5)
PLATELET # BLD AUTO: 134 10E9/L (ref 150–450)
PLATELET # BLD EST: ABNORMAL 10*3/UL
POTASSIUM SERPL-SCNC: 4.4 MMOL/L (ref 3.4–5.3)
RBC # BLD AUTO: 3.77 10E12/L (ref 4.4–5.9)
SODIUM SERPL-SCNC: 134 MMOL/L (ref 133–144)
SPECIMEN SOURCE: NORMAL
SPECIMEN SOURCE: NORMAL
WBC # BLD AUTO: 4.3 10E9/L (ref 4–11)

## 2018-08-15 PROCEDURE — A9270 NON-COVERED ITEM OR SERVICE: HCPCS | Mod: GY | Performed by: INTERNAL MEDICINE

## 2018-08-15 PROCEDURE — 84100 ASSAY OF PHOSPHORUS: CPT | Performed by: COLON & RECTAL SURGERY

## 2018-08-15 PROCEDURE — 25000131 ZZH RX MED GY IP 250 OP 636 PS 637: Mod: GY | Performed by: STUDENT IN AN ORGANIZED HEALTH CARE EDUCATION/TRAINING PROGRAM

## 2018-08-15 PROCEDURE — 25000128 H RX IP 250 OP 636: Performed by: STUDENT IN AN ORGANIZED HEALTH CARE EDUCATION/TRAINING PROGRAM

## 2018-08-15 PROCEDURE — 25000132 ZZH RX MED GY IP 250 OP 250 PS 637: Mod: GY | Performed by: STUDENT IN AN ORGANIZED HEALTH CARE EDUCATION/TRAINING PROGRAM

## 2018-08-15 PROCEDURE — 25000125 ZZHC RX 250: Performed by: STUDENT IN AN ORGANIZED HEALTH CARE EDUCATION/TRAINING PROGRAM

## 2018-08-15 PROCEDURE — 85025 COMPLETE CBC W/AUTO DIFF WBC: CPT | Performed by: COLON & RECTAL SURGERY

## 2018-08-15 PROCEDURE — 25000125 ZZHC RX 250: Performed by: COLON & RECTAL SURGERY

## 2018-08-15 PROCEDURE — 40000802 ZZH SITE CHECK

## 2018-08-15 PROCEDURE — A9270 NON-COVERED ITEM OR SERVICE: HCPCS | Mod: GY | Performed by: COLON & RECTAL SURGERY

## 2018-08-15 PROCEDURE — 99231 SBSQ HOSP IP/OBS SF/LOW 25: CPT | Performed by: NURSE PRACTITIONER

## 2018-08-15 PROCEDURE — 36415 COLL VENOUS BLD VENIPUNCTURE: CPT | Performed by: COLON & RECTAL SURGERY

## 2018-08-15 PROCEDURE — 00000146 ZZHCL STATISTIC GLUCOSE BY METER IP

## 2018-08-15 PROCEDURE — 83735 ASSAY OF MAGNESIUM: CPT | Performed by: COLON & RECTAL SURGERY

## 2018-08-15 PROCEDURE — 25800025 ZZH RX 258: Performed by: STUDENT IN AN ORGANIZED HEALTH CARE EDUCATION/TRAINING PROGRAM

## 2018-08-15 PROCEDURE — 25000131 ZZH RX MED GY IP 250 OP 636 PS 637: Mod: GY | Performed by: INTERNAL MEDICINE

## 2018-08-15 PROCEDURE — 25000132 ZZH RX MED GY IP 250 OP 250 PS 637: Mod: GY | Performed by: INTERNAL MEDICINE

## 2018-08-15 PROCEDURE — 21400006 ZZH R&B CCU INTERMEDIATE UMMC

## 2018-08-15 PROCEDURE — 40000141 ZZH STATISTIC PERIPHERAL IV START W/O US GUIDANCE

## 2018-08-15 PROCEDURE — 99233 SBSQ HOSP IP/OBS HIGH 50: CPT | Mod: GC | Performed by: INTERNAL MEDICINE

## 2018-08-15 PROCEDURE — 25000132 ZZH RX MED GY IP 250 OP 250 PS 637: Mod: GY | Performed by: COLON & RECTAL SURGERY

## 2018-08-15 PROCEDURE — 40000901 ZZH STATISTIC WOC PT EDUCATION, 0-15 MIN

## 2018-08-15 PROCEDURE — 80048 BASIC METABOLIC PNL TOTAL CA: CPT | Performed by: COLON & RECTAL SURGERY

## 2018-08-15 RX ORDER — DEXTROSE MONOHYDRATE 100 MG/ML
INJECTION, SOLUTION INTRAVENOUS CONTINUOUS
Status: DISCONTINUED | OUTPATIENT
Start: 2018-08-15 | End: 2018-08-19

## 2018-08-15 RX ORDER — LIDOCAINE 40 MG/G
CREAM TOPICAL
Status: DISCONTINUED | OUTPATIENT
Start: 2018-08-15 | End: 2018-08-15

## 2018-08-15 RX ORDER — NALOXONE HYDROCHLORIDE 0.4 MG/ML
.1-.4 INJECTION, SOLUTION INTRAMUSCULAR; INTRAVENOUS; SUBCUTANEOUS
Status: DISCONTINUED | OUTPATIENT
Start: 2018-08-15 | End: 2018-08-16

## 2018-08-15 RX ORDER — SULFAMETHOXAZOLE AND TRIMETHOPRIM 400; 80 MG/1; MG/1
1 TABLET ORAL
Status: DISCONTINUED | OUTPATIENT
Start: 2018-08-16 | End: 2018-08-16

## 2018-08-15 RX ORDER — UREA 40 %
CREAM (GRAM) TOPICAL DAILY
Qty: 85 G | Refills: 1 | Status: SHIPPED | OUTPATIENT
Start: 2018-08-15 | End: 2019-03-13

## 2018-08-15 RX ORDER — NALOXONE HYDROCHLORIDE 0.4 MG/ML
.1-.4 INJECTION, SOLUTION INTRAMUSCULAR; INTRAVENOUS; SUBCUTANEOUS
Status: ACTIVE | OUTPATIENT
Start: 2018-08-15 | End: 2018-08-16

## 2018-08-15 RX ADMIN — DEXTROSE MONOHYDRATE 25 ML: 500 INJECTION PARENTERAL at 08:51

## 2018-08-15 RX ADMIN — VANCOMYCIN HYDROCHLORIDE 125 MG: KIT at 11:21

## 2018-08-15 RX ADMIN — DEXTROSE MONOHYDRATE 50 ML: 500 INJECTION PARENTERAL at 02:38

## 2018-08-15 RX ADMIN — VANCOMYCIN HYDROCHLORIDE 125 MG: KIT at 15:47

## 2018-08-15 RX ADMIN — VANCOMYCIN HYDROCHLORIDE 125 MG: KIT at 22:37

## 2018-08-15 RX ADMIN — GANCICLOVIR SODIUM 100 MG: 500 INJECTION, POWDER, LYOPHILIZED, FOR SOLUTION INTRAVENOUS at 18:11

## 2018-08-15 RX ADMIN — NYSTATIN 1000000 UNITS: 100000 SUSPENSION ORAL at 11:21

## 2018-08-15 RX ADMIN — Medication: at 22:33

## 2018-08-15 RX ADMIN — PREDNISONE 10 MG: 10 TABLET ORAL at 19:49

## 2018-08-15 RX ADMIN — NYSTATIN 1000000 UNITS: 100000 SUSPENSION ORAL at 08:25

## 2018-08-15 RX ADMIN — NYSTATIN 1000000 UNITS: 100000 SUSPENSION ORAL at 15:48

## 2018-08-15 RX ADMIN — ACETAMINOPHEN 1000 MG: 500 TABLET, FILM COATED ORAL at 15:47

## 2018-08-15 RX ADMIN — Medication 5 MG: at 08:25

## 2018-08-15 RX ADMIN — CEFEPIME HYDROCHLORIDE 1 G: 1 INJECTION, POWDER, FOR SOLUTION INTRAMUSCULAR; INTRAVENOUS at 21:20

## 2018-08-15 RX ADMIN — PREDNISONE 10 MG: 10 TABLET ORAL at 08:25

## 2018-08-15 RX ADMIN — Medication 250 MG: at 19:49

## 2018-08-15 RX ADMIN — ACETAMINOPHEN 1000 MG: 500 TABLET, FILM COATED ORAL at 19:49

## 2018-08-15 RX ADMIN — TACROLIMUS 2 MG: 1 CAPSULE ORAL at 18:11

## 2018-08-15 RX ADMIN — NYSTATIN 1000000 UNITS: 100000 SUSPENSION ORAL at 19:49

## 2018-08-15 RX ADMIN — DEXTROSE MONOHYDRATE 25 ML: 500 INJECTION PARENTERAL at 12:22

## 2018-08-15 RX ADMIN — METRONIDAZOLE 500 MG: 500 INJECTION, SOLUTION INTRAVENOUS at 02:45

## 2018-08-15 RX ADMIN — MYCOPHENOLATE MOFETIL 500 MG: 250 CAPSULE ORAL at 08:25

## 2018-08-15 RX ADMIN — TACROLIMUS 1 MG: 1 CAPSULE ORAL at 08:25

## 2018-08-15 RX ADMIN — ROSUVASTATIN CALCIUM 20 MG: 20 TABLET, FILM COATED ORAL at 08:25

## 2018-08-15 RX ADMIN — PANTOPRAZOLE SODIUM 40 MG: 40 INJECTION, POWDER, FOR SOLUTION INTRAVENOUS at 08:25

## 2018-08-15 RX ADMIN — METRONIDAZOLE 500 MG: 500 INJECTION, SOLUTION INTRAVENOUS at 11:21

## 2018-08-15 RX ADMIN — MYCOPHENOLATE MOFETIL 500 MG: 250 CAPSULE ORAL at 18:11

## 2018-08-15 RX ADMIN — VANCOMYCIN HYDROCHLORIDE 125 MG: KIT at 08:25

## 2018-08-15 RX ADMIN — DEXTROSE MONOHYDRATE: 100 INJECTION, SOLUTION INTRAVENOUS at 13:47

## 2018-08-15 RX ADMIN — METRONIDAZOLE 500 MG: 500 INJECTION, SOLUTION INTRAVENOUS at 19:49

## 2018-08-15 RX ADMIN — ACETAMINOPHEN 1000 MG: 500 TABLET, FILM COATED ORAL at 08:25

## 2018-08-15 ASSESSMENT — ACTIVITIES OF DAILY LIVING (ADL)
ADLS_ACUITY_SCORE: 11
ADLS_ACUITY_SCORE: 12
ADLS_ACUITY_SCORE: 12
ADLS_ACUITY_SCORE: 11
ADLS_ACUITY_SCORE: 12
ADLS_ACUITY_SCORE: 10

## 2018-08-15 ASSESSMENT — PAIN DESCRIPTION - DESCRIPTORS: DESCRIPTORS: ACHING

## 2018-08-15 NOTE — PROGRESS NOTES
[facility]  Transplant Infectious Disease Progress Note     Patient:  Murray Nicholson, Date of birth 1955, Medical record number [id number]  Date of Visit:  08/15/2018         Assessment and Recommendations:   Recommendations:  - Continue cefepime and metronidazole for empiric coverage of enteric organisms (including the GNRs and GPC growing in the 8/14/18 abdominal fluid OR culture) and the Pseudomonas which had grown from blood cultures previously.  - Continue secondary prophylaxis with PO vancomycin for C difficile  - Continue ganciclovir induction therapy dosing for ganciclovir 1.25mg/kg three times weekly for possible CMV disease with monitoring of CBC diff while on therapy.  - Once patient is off cefepime therapy, we would recommend checking surveillance blood cultures 1 and 2 weeks after cessation of therapy to document clearance of the prior bacteremia.  - For PJP prophylaxis, we recommend resuming bactrim regimen 1 SS tab three times weekly (HD dosing) since leukopenia is resolved.  - For small pulmonary nodules, we would recommend sooner interval imaging in transplant patient with repeat CT in 4-6 weeks.  - Await finalization of the 8/14/18 OR cultures (aerobic, anaerobic) and also the histopathology to look for evidence of CMV     Assessment:  Mr. Nicholson is a 63-year-old gentleman with history of ischemic/valvular cardiomyopathy s/p OHT on 6/14/18 (induction with solumedrol and maintenance with tacrolimus, MMF, and prednisone), ESRD on TTa hemodialysis currently listed for kidney transplantation, history of polymicrobial peritonitis with Candida glabrata when previously on peritoneal dialysis in 1/2018, recent Pseudomonas aeruginosa bacteremia on 7/26/18 with retained dialysis catheter, hypertension, hyperlipidemia, and type 2 diabetes who was admitted on 8/12 with subjective fevers, severe lower abdominal pain, and bloody stools.  Now C diff positive.  He is post-op day #1  s/p sigmoidectomy.     ID issues include the followin. 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  During his prior admission, he had imaging findings suggestive of early colitis and more recent imaging from  suggesting progression of this now with development of abscesses.  Throughout this time period, he had been on broad-spectrum coverage with cefepime which should suppress many enteric organisms though with holes in anaerobic coverage that could explain progression of symptoms.  Due to considering of many factors including that CMV colitis can occur without viremia, the fact that he is high-risk due to CMV recipient negative serostatus and the potential adverse effects of selecting for resistant CMV with lower levels of prophylactic dosing of ganciclovir, we would favor starting induction therapy dosing for ganciclovir (HD dosing).  With respect to abscesses, he is s/p I&D of smaller daniella-rectal abscess on .  Taken to the OR 18 for laparoscopic drainage of the abscess with sigmoidectomy and end colostomy.  A jejunocolonic fistula was found requiring partial small bowel resection.    2. C diff positive diarrhea:  Commenced therapy with oral vancomycin on .       3.  Pseudomonas aeruginosa bacteremia associated with a hemodialysis catheter that was retained: History of positive blood cultures from  but by report, there have not been more recent positive cultures since then.  There is certainly a risk that he may have recrudescence of bacteremia as result of biofilm formation on the line that was retained.  However, at present, the cefepime would be suppressing growth as he has not been off antibiotics for any period of time.  He has not had evidence of breakthrough bacteremia as  "assessed by blood cultures from 8/10 as well as more recent blood cultures drawn during this admission.  Because of his intra-abdominal abscesses, we expect that he will continue to be on an agent active against Pseudomonas for some time.  Due to risk of colonization of the line, we would recommend surveillance blood cultures in the future with timing to be determined based on his final duration of antibiotic therapy but anticipated to be 1 and 2 weeks after cessation of any antibiotics active against Pseudomonas.     4. New small (3mm and 5mm) pulmonary nodules: Would repeat CT chest in 4-6 weeks.    5. Oral ulcer: Viral testing during last admission was negative.  Wound culture grew Pseudomonas in addition to oral tiffany.  Have not seen previously but improving by patient report.     Other:  - QTc interval: 451ms 7/26/18  - Viral serostatus & prophylaxis: HSV1-, HSV2+, CMV D+/R-, EBV D?/R+, VZV+.  Valganciclovir held since 7/20 due to neutropenia.  - PJP: Pentamidine, but favor switch back to TMP-SMX.  - Isolation status: Enteric for C difficile carriage.     Transplant ID will continue to follow with you.     Vinayak Lawrence MD  Pager: [phone]        Interval History:   Mr. Nicholson remains afebrile (T max 99.2 degrees) since admission.His peripheral WBC is stable at 4.3 on ongoing cefepime (renally dosed), metronidazole, and prophylactic oral vancomycin.  He is POD #1 s/p laparoscopic abscess drainage and sigmoidectomy with end colostomy and partial small bowel resection for a jejunocolonic fistula.  He is having a fair amount of \"everywhere\" post-operative abdominal pain so is trying not to move in bed and is somewhat confused about how often to hit his PCA trigger.  He is alert, oriented, and fully conversant, but is slightly confused today from analgesia, puzzled regarding what to do when his oximeter falsely alarms.  Beyond the abdominal pain, he otherwise feels generally well without dyspnea (on low-flow " nasal cannula oxygen), chest pain, cough, EENT symptoms, nausea, rash, or other new complaint today.  Intraoperative (presumably abscess) fluid culture from 8/14/18 is so far growing light growth of GNRs and GPC; the 8/14/18 histopathology is not yet reported.    Anti-infectives:  Current  Cefepime 7/26-present  IV metronidazole 8/12-present  PO vancomycin 8/13-present    Transplants:  6/14/2018 (Heart), Postoperative day:  62.  Coordinator Britni Mcknight    Immunosuppression: Mycophenolate 500 mg twice daily, tacrolimus 1 mg every morning and 2 mg every afternoon prior trough goal had been 10-12 but currently targeting 8.  Prednisone 15 mg twice daily         Current Medications & Allergies:       acetaminophen  1,000 mg Oral 4x Daily     biotin  5 mg Oral Daily     ceFEPIme (MAXIPIME) IV  1 g Intravenous Q24H     fluticasone  1-2 spray Both Nostrils Daily     ganciclovir (CYTOVENE) intermittent infusion  1.25 mg/kg Intravenous Once per day on Mon Wed Fri     insulin aspart  1-6 Units Subcutaneous Q4H     lipids  250 mL Intravenous Q24H     metroNIDAZOLE  500 mg Intravenous Q8H     mycophenolate  500 mg Oral BID IS     nystatin  1,000,000 Units Swish & Swallow 4x Daily     pantoprazole  40 mg Intravenous Q24H     predniSONE  10 mg Oral BID     rosuvastatin  20 mg Oral Daily     saccharomyces boulardii  250 mg Oral BID     sodium chloride (PF)  3 mL Intracatheter Q8H     sodium chloride (PF)  3 mL Intracatheter Q8H     tacrolimus  1 mg Oral QAM     tacrolimus  2 mg Oral QPM     triamcinolone   Topical BID     vancomycin  125 mg Oral 4x Daily     Infusions/Drips:    IV fluid REPLACEMENT ONLY       dextrose       HYDROmorphone       lactated ringers 40 mL/hr at 08/14/18 2212     - MEDICATION INSTRUCTIONS -       Patient RECEIVING antibiotic to treat a different condition and it provides ADEQUATE COVERAGE for this surgical procedure.       Allergies   Allergen Reactions     Norco  [Hydrocodone-Acetaminophen] Nausea and Vomiting     Cats      Throat tightness     Isosorbide Other (See Comments)     hypotension     Penicillins Hives     Seasonal Allergies      rhinitis     Shrimp      Throat closes           Review of Systems:   As per Interval History.  Complete ROS is otherwise negative.         Physical Exam:   Vitals were reviewed and are stable.  Vital sign ranges over the past 24 hours:  Temp:  [97.7  F (36.5  C)-99.2  F (37.3  C)] 98.7  F (37.1  C)  Heart Rate:  [78-85] 82  Resp:  [12-16] 16  BP: (129-153)/() 150/100  Cuff Mean (mmHg):  [112-117] 117  SpO2:  [98 %-100 %] 100 %    Intake/Output Summary (Last 24 hours) at 08/15/18 1239  Last data filed at 08/15/18 1000   Gross per 24 hour   Intake             1388 ml   Output              210 ml   Net             1178 ml     Physical Examination:  GENERAL:  Pleasant, conversant, well-developed, well-nourished 64 yo man comfortable when lying still abed.  HEAD:  NCAT   EYES:  EOMI, anicteric sclerae  ENT:  No otorrhea.  No anterior oral lesion  LUNGS:  Clear to auscultation bilaterally  CARDIOVASCULAR:  Regular rate and rhythm with no murmur.  Well-healed surgical scars.    ABDOMEN:  Absent bowel sounds post-op.  Diffuse abdominal tenderness with mild guarding.  New RLQ colostomy with empty bag, clean stoma.  Clean / dry laporoscope incisions.  SKIN:  No acute rash.  NEUROLOGIC:  Alert, oriented x3, memory is very slightly weak with slight analgesia confusion, but grossly cognitively intact.  Moves extremities x 4  T/L/D: Tunneled dialysis catheter at right chest lacks inflammation         Laboratory Data:   Creatinine 5.20 (on chronic hemodialysis)  BUN 30  K 4.4  Bicarb 25     CRP 76 > 230  Procalcitonin 33.03 > 29.21     WBC 4.3  ANC [place] of 0.3 on 7/29/18; ANC consistently in normal range since 8/1/18 and currently 3.6  ALC 0.4  Hemoglobin 11.0  Platelets 134     Tacro trough 8.4 on 8/12.  Had supratherapeutic levels up to 24.7  on 18.     Microbiology    Abdm abscess fluid :  Light growth GNR, light growth GPC.  Anaerobic cx NGTD.    Blood                         18 dialysis catheter tip 2 strains CoNS                         7/26/18 x2 from [facility] Pseudomonas aeruginosa (Microbiology report obtained from [facility] and copy placed in paper chart.  Pan-sensitive).                         7/26/18 x2 [facility] negative                         7/28/18 x2 negative                         8/10/18 ×2 NGTD                         18 ×3 NGTD     Wound                         18: Mouth wound with pansensitive pseudomonas aeruginosa and normal tiffany                         18 Mouth ulcer     Peritoneal fluid                         18 13 yellow fluid with 4736 WBCs, 80% neutrophils.  Gram stain with no organisms and many WBCs.  Culture with Candida glabrata and Bacteroides caccae.                         1/15/18: Peritoneal fluid with 4256 WBCs, 91% neutrophils.  Gram stain with many WBCs and no organisms seen.  Culture with Staphylococcus epidermidis, Candida glabrata    Enteric  Enteric LOGAN   18 negative  Giardia ag   18 negative  C diff   18 positive  Cryptosporidium ag   18 pending  Microsporidium ag   18 pending     Virology  CMV blood PCR                         18 negative                         18 negative  EBV blood PCR                         18 negative  Parvovirus blood PCR                          18 negative  VZV blood PCR                         18 negative     Imagin/26/18: CT CAP:  1. Findings suggestive of mild infectious or inflammatory colitis of the ascending colon just proximal to the cecum. No pericolonic fluid collection.  2. Postoperative changes of cardiac transplant with no significant change in loculated retrosternal fluid and soft tissue stranding compared to 2018.  3. 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        8/12/18 CT abd/pelv:  1 a. Inflamed segment of distal sigmoid colon in the setting of diverticulosis, most likely diverticulitis. This is complicated by a pericolonic abscess measuring up to 7.8 cm, which abuts multiple loops of small bowel.   1 b. Inflamed loop of proximal sigmoid colon, most compatible with uncomplicated diverticulitis.  1 c. Inflammation of the right colon compatible with colitis, typhlitis is a consideration setting of neutropenia.  1 e. Unchanged inflammation of the proximal duodenum, with a chronic appearance. Peptic ulcer disease is a consideration.  2. Small perirectal abscess, measuring up to 2.7 cm.  3. New right lower lobe pulmonary nodules measuring 5 and 3 mm, respectively. Recommend short-term follow-up CT chest in 3 months.  4. Stable size of multiple stable pancreatic IPMNs.

## 2018-08-15 NOTE — PLAN OF CARE
Problem: Patient Care Overview  Goal: Plan of Care/Patient Progress Review  Outcome: No Change  D: S/p Colectomy POD 0-1. Heart transplant June 26  I/A: VSS, slightly hypertensive. MD aware. Pain managed with Dilaudid PCA. Sinus rhythm. NPO except meds and ice chips. Hypoglycemic episode this shift. Blood sugar was 49 at 0200 check. Writer gave D50 (1 amp). Blood sugar was 165 after. MD was notified. Patient on LR at 40 ml/hr. Easily aroused. Capnography monitoring continues. Dobbins and Colostomy intact. No output from either. Patient is on hemodialysis.  P: Continue to monitor.    Blood sugar at 0630 was 71. MD team notified of this result and earlier Hypoglycemic episode. Will continue to monitor.

## 2018-08-15 NOTE — PROGRESS NOTES
"Capnography    Pt continuously removing 02 with capnography monitoring making capnography alarms to go off. Pt and pt's SO found to be attempting to push/change buttons on capnography machine. Instructed pt that he needed to keep on so we could get accurate readings. Pt replied \"Dr said I could take it off for some time\" I explained  Dr did not place that order and I could not get alarms to stop alarming if he continued to take monitoring off.  Pt did not place capnography monitoring back on . MD called regarding pt's lack of  compliance and cooperation. MD came and talked with pt. Informed MD capnography standing order states capnography can be removed 24 hours after initiation if VSS and no changes of dose of PCA. MD requested we use capnography when pt sleeping but may be off during day.  "

## 2018-08-15 NOTE — PLAN OF CARE
Problem: Patient Care Overview  Goal: Plan of Care/Patient Progress Review  Outcome: No Change    D: s/p Laparoscopic Hand Assisted Takedown of Splenic Flexure, Sigmoidectomy, Small Bowel Resection, Takedown of Small Bowel to Colon Fistula. Hx heart transplant (6/2018), bacteremia, Dialysis (Tu, Th, Sa), DM 2.     I/A: A&Ox4. Hypertensive 130s-140s/100s. Otherwise VSSA, on 2L O2 via NC. On capnography. Dobbins cath. Colostomy left abdomen. Right Ros[place] for dialysis. Right PIV, lactated ringers @ 40 mL/hr. PCA dilaudid with 0.2mg available every 10 min with an hour max out of 1.2 mg. C/o abdominal pain, controlled with PCA. Tolerating oral meds well. Rectal site from I&D 8/13, WDL. Preventive mepliex on coccyx.    P: Continue to monitor pt. Notify Cards 2 with pertinent changes.

## 2018-08-15 NOTE — PROGRESS NOTES
Colorectal Surgery Progress Note  POD#1    Subjective:  No acute events overnight. Afebrile. Abdominal pain well controlled with PCA. Tolerating sips with meds without nausea or vomiting. No gas or stool. Endorses makes minimal urine, normally on dialysis.    Vitals:  Vitals:    08/15/18 0200 08/15/18 0300 08/15/18 0400 08/15/18 0500   BP: (!) 144/102 (!) 145/104 (!) 153/103 (!) 146/104   BP Location:       Pulse:       Resp:       Temp:       TempSrc:       SpO2:       Weight:       Height:           I/O:  I/O last 3 completed shifts:  In: 1015 [I.V.:450]  Out: 2710 [Urine:60; Other:2500; Blood:150]    Physical Exam:  Gen: AAOx3, NAD  Pulm: Non-labored breathing on 2LPM  Abd: Soft, non-distended, appropriately tender, no guarding   Incision C/D/I low midline with dermabond, without erythema   Stoma pink and viable with no stool or gas in bag  Ext:  Warm and well-perfused    BMP  Recent Labs  Lab 08/14/18  0620 08/13/18  0618 08/13/18  0121 08/12/18  1253 08/12/18  0834  08/10/18  1104 08/08/18  0921    135 136 136 138  < > 135 132*   POTASSIUM 4.5 3.8 3.9 3.8 3.5  < > 4.8 5.1   CHLORIDE 102 103 101 100 101  < > 102 99   CO2 20 24 23 25 25  < > 24 22   BUN 48* 38* 34* 26 24  < > 32* 38*   CR 7.08* 5.72* 5.27* 4.45* 4.01*  < > 4.87* 5.01*   * 40* 81 131* 108*  < > 220* 134*   MAG 2.5* 2.5* 2.5*  --  1.4*  --  1.6 1.7   PHOS 5.7*  --   --   --  2.6  --  2.8 3.1   < > = values in this interval not displayed.  CBC  Recent Labs  Lab 08/14/18  0620 08/13/18  0618 08/13/18  0121 08/12/18  1253  08/12/18  0834   WBC 3.7* 5.2  --  4.8  --  4.9   HGB 9.0* 8.6* 9.0* 7.5*  < > 7.4*   HCT 27.7* 27.0*  --  23.7*  --  23.2*   * 117*  --  111*  --  131*   < > = values in this interval not displayed.        ASSESSMENT:   63 year old male h/o heart TXP 6/2018 on immunosuppressants, ESRD on HD, R IJ thrombus on apixabam (held, last dose 2d ago), T2DM, ongoing pseudomonal bacteremia p/w abd pain & BRBPR w/first  episode of diverticulitis c/b paracolonic abscess seen on CT. CT reviewed, LLQ 8-cm paracolonic abscess. Appears to be surrounded by SB, and may have a component of intramural abscess with stool in its interior. Sigmoid appears to be moderately inflamed, but there is some colon wall inflammation involving the ascending colon as well. Co-incidentally there is a right-sided anorectal abscess as well. His anorectal exam confirmed this. Most likely diverticulitis but may also be perforated stercoral ulcer, or complication of CMV colitis, C Diff colitis or ischemic colitis.      - POD#1 s/p laparoscopic assisted sigmoid colectomy with end colostomy.  - POD #3 s/p perirectal abscess I&D   - WOCN teaching  - NPO except meds, please minimize oral meds  - TPN to optimize nutrition, until return of bowel function and tolerating p.o.  - Hold anticoagulation 24 hours post-op, would recommend heparin gtt starting tomorrow and then once therapeutic if no evidence of bleed then transition to home apixaban  - Please continue cefepime and flagyl for intra-abdominal contamination for at least 48 hours post-op, defer final duration to primary/ID  - Okay to DC Dobbins, defer to primary if bladder scans and straight cath PRN    Appreciate excellent cares by primary team. Colorectal will continue to follow. Please call with any questions or concerns.        Jackie Harper,  General Surgery PGY-1  Colon and Rectal Surgery Service  [phone]    Patient was seen with fellow who will discuss with staff.      Addendum:  Above recommendations were verbally discussed with primary cardiology team.  Stephanie Prado PA-C    Attestation:  This patient has been seen and evaluated by me.  Discussed with the house staff team or resident(s) and agree with the findings and plan in this note.  Russel Baron MD  Professor of Surgery  Chief, Division of Colon and Rectal Surgery  [phone]

## 2018-08-15 NOTE — PROGRESS NOTES
"REGIONAL ANESTHESIA PAIN SERVICE  SUBJECTIVE  Interval history: Patient reports his abdomen has been very sensitive to touch and he was having difficulty maintaining pain control during the night because he forgot to use the PCA button, pain seems a little better today, denies pain at rest and has moderate pain with cough and movement in bed since he has been using the button more frequently.  Has not been out of bed yet. Dobbins removed around noon.   NPO,  denies nausea.  Denies any weakness, paresthesias, circumoral numbness, metallic taste or tinnitus.     Medications related to Pain Management (Future)    Start     Dose/Rate Route Frequency Ordered Stop    08/14/18 1715  HYDROmorphone (DILAUDID) PCA 1 mg/mL OPIOID NAIVE       Intravenous CONTINUOUS 08/14/18 1706      08/13/18 0032  HYDROmorphone (PF) (DILAUDID) injection 0.3-0.5 mg      0.3-0.5 mg Intravenous EVERY 2 HOURS PRN 08/13/18 0032      08/12/18 2100  acetaminophen (TYLENOL) tablet 1,000 mg      1,000 mg Oral 4 TIMES DAILY 08/12/18 2056 08/12/18 1958  lidocaine (XYLOCAINE) 2 % topical gel       Topical EVERY 4 HOURS PRN 08/12/18 1958      08/12/18 1905  lidocaine 1 % 1 mL      1 mL Other EVERY 1 HOUR PRN 08/12/18 1905      08/12/18 1905  lidocaine (LMX4) cream       Topical EVERY 1 HOUR PRN 08/12/18 1906      08/12/18 1222  traMADol (ULTRAM) tablet 50 mg      50 mg Oral EVERY 12 HOURS PRN 08/12/18 1222              OBJECTIVE:    BP (!) 139/103 (BP Location: Left arm)  Pulse 70  Temp 98.3  F (36.8  C) (Oral)  Resp 16  Ht 1.753 m (5' 9\")  Wt 78.8 kg (173 lb 11.6 oz)  SpO2 98%  BMI 25.65 kg/m2  Exam:  General: alert, no distress at rest and moderate distress with movement, cough  Neuro: full AROM upper and lower extremities    ASSESSMENT/PLAN:    Murray Nicholson is a 63 year old male with h/o heart transplant 6/2018 - on immunosuppressants, ESRD - on HD, R internal jugular thrombus - on apixabam, T2DM, w/ first episode diverticulitis complicated by " paracolonic abscess seen on CT, now POD #1 s/p  EXAM UNDER ANESTHESIA RECTUM  COMBINED INCISION AND DRAINAGE RECTUM  LAPAROSCOPIC ASSISTED SIGMOID COLECTOMY with single shot injection bilateral transversus abdominis plane (TAP) nerve block.  Total bupivacaine 0.25% 20 mL and liposomal (long-acting) bupivacaine (Exparel) 1.3% 20 mL administered 8/14/18 for postop pain control.  No weakness or paresthesias.  No evidence of adverse side effects associated with nerve block injections.  Pt acheiving adequate pain control, with nerve block and Dilaudid PCA.  Anticipate 48-72 hours of pain control with long-acting local anesthetic. Additionally, pt will continue to require multimodal analgesia for visceral/muscle/musculoskeletal pain not controlled with long-acting local anesthetic.      - NO other local anesthetic use within 96 hours of liposomal bupivacaine (Exparel), unless approved by [facility]  - patient received verbal and written instructions about liposomal bupivacaine and counseling about pharmacologic and nonpharmacologic measures for acute postoperative pain management  - please call [facility] if questions or concerns    VERONICA Álvarez [facility]  Regional Anesthesia Pain Service ([facility])  8/15/2018 10:32 AM    [facility] Contact Info (for in-house use only):  Job code ID: [id number]   [email or web address]  [facility] 0602  [employer] phone: dial [phone], enter jobcode ID, then enter call-back number.    Text: Use [employer]game on the Intranet <Paging/Directory> tab and enter Jobcode ID.   If no call back at any time, contact the hospital  and ask for RAPS attending or backup

## 2018-08-15 NOTE — PROGRESS NOTES
"  WO Nurse Inpatient [facility]   WO Nurse Inpatient Adult     Initial Assessment   Assessment of new end Colostomy Stoma complication(s) none   Mucocutaneous junction; not visualized; barrier intact  Peristomal complication(s) none   Pouch wear time:less than 24 hours  Following today's visit:Patient  is  able to demonstrate;       1. How to empty their pouch? no      2. How to change their pouch?  no      3. How to read and record intake and output correctly? no    Objective data:  Patient history according to medical record:63 year old male- POD#1 s/p laparoscopic assisted sigmoid colectomy with end colostomy- POD #3 s/p perirectal abscess I&D. Pt has h/o heart TXP 6/2018 on immunosuppressants, ESRD on HD, R IJ thrombus on apixabam (held, last dose 2d ago), T2DM, ongoing pseudomonal bacteremia p/w abd pain & BRBPR w/first episode of diverticulitis c/b paracolonic abscess seen on CT.      Current Diet/Nutrition:   Active Diet Order      NPO for Medical/Clinical Reasons Except for: Meds, Ice Chips   TPN yes   I/O last 3 completed shifts:  In: 1388 [I.V.:823]  Out: 2710 [Urine:60; Other:2500; Blood:150]  Labs:    Recent Labs  Lab 08/15/18  0638 08/14/18  0620  08/12/18  0834   ALBUMIN  --  1.6*  < >  --    HGB 11.0* 9.0*  < > 7.4*   INR  --   --   --  1.31*   WBC 4.3 3.7*  < > 4.9   CRP  --  230.0*  --   --    < > = values in this interval not displayed.     Psychosocial: Pt previously lived alone in apt, sons and friends help as needed, Kidney transplant 6/14/18  Physical Exam:  Current pouching system:Shashi 2 piece 70 mm bag.  Reason for pouch change today: pouch not changed today  Stoma appearance: pink, round, moist and protruberant  Stoma size; Approx 1 1/8\" with adequate protrusion  Peristomal skin: not visualized (barrier in place)  Stoma output :brown and liquid- scant amount of bowel sweat in pouch; no gas noted  Abdominal  Assessment  firm and distended , NG still in place? No  Surgical " Site: open to air  Pain: Sharp- pt c/o pain with any movement or palpation of abdomen  Is patient still on a PCA Yes    Interventions:  Patient's chart evaluated.  Focus of today's visit: have pt look at stoma, evaluate seal from OR pouch  Participant of teaching session today patient   Orders: Reviewed  Change made with ostomy management today: No  Patient/family: observing  Supplies:Gathered    Plan:  Learning needs: initial fitting, pouch change demonstration , verbal instruction , diet and hydration , pouch emptying, output measurement, odor/flatus management, lifestyle adjustments and discharge instructions  Preparation for discharge: No discharge preparations started  Recommend home care? TBD    Discussed plan of care with Patient  Nursing to notify the Provider(s) and re-consult the WOC Nurse if new ostomy concerns or discharge planned before next planned WOC visit.    WOC Nurse will return: Spike  Face to face time: 10 minutes    Fatimah Cerda RN, WOC student

## 2018-08-15 NOTE — PROGRESS NOTES
CLINICAL NUTRITION SERVICES - BRIEF NOTE    Received consult from MD for Low Residue Diet education. Pt is POD #1  laparoscopic assisted sigmoid colectomy with end colostomy and POD #3 s/p perirectal abscess I and D. Pt is also s/p heart transplant in June 2018. Pt with ESRD on HD. Noted RD was consulted on 8/13/18 for Pharmacy/Nutrition to start and manage TPN. Per consult, pt was assessed and a change TPN change request was sent to pharmacy on 8/13 and another request to increase Dex in TPN sent on 8/14 (please refer to RD notes from 8/13 and 8/14). But per chart review pt never received TPN 2/2 to no access. Noted colorectal surgery team indicating pt would need TPN to optimize nutrition while waiting for ROBF. Discussed with cardiothoracic MD (fellow) this am to verify status of PN and per CVTS team, preference would be to feed the gut vs PN,  so waiting on decision for nutrition at this time.     Received provider consult also for nutrition education with comments low residue diet teaching (automatic consult on post-op order set). Pt is S/p lap sigmoid colectomy with end colostomy on 8/14/18. Currently pt is NPO awaiting return of GI function post op. Pt not appropriate for education at this time 2/2 NPO status. Nutrition education to be completed as able/appropriate.    RD will continue to follow/monitor pt's POC and will reassess pt per protocol. Please consult or page RD if needed before that.     Cydney Ryan RD LD  Weekday pager - [phone]   Weekend pager - [phone]

## 2018-08-15 NOTE — PROGRESS NOTES
D: S/p sigmoid colectomy + ostomy placement 8/14. Hx of heart transplant (60 days ago), ESRD on HD, DM2    I: Monitored vitals and assessed pt status.   Changed:   Running: PCA Dilaudid, LR @ 40 mL/hr, and D10 @ 10 mL/hr.  + int abx.  PRN: n/a    A: A0x4. VSS on 1 L NC . Afebrile. SR. Pt on capnography - alarms while sleeping d/t low respirations - MD aware - see previous note. Hypoglycemic throughout shift - pushed D50 x3 and monitored BS closely - D10 gtt started. Dobbins removed this am - no urine output d/t anuria. Abdominal incision OBED - no drainage. Ostomy red/pink and intact. No gas or stool present. NPO + ice chips + critical meds only. Pt declined ambulating this shift - writer stressed the importance of moving and staying ambulatory.     P: Continue to monitor Pt status and report changes to treatment team.

## 2018-08-15 NOTE — PROGRESS NOTES
Cardiology 2 Progress Note           Assessment and Plan:   Murray Nicholson is a 63 year old male with a h/o NICM s/p heart txp (6/14/18), ESRD on HD, R internal jugular thrombus on apixaban and DM2 p/w 3-4 bright red BM and fevers/chills, found to have a daniella-colonic abscess (7.8 cm), daniella-rectal abscess (2.7 cm), probable diverticulitis, and R colon colitis.  Planned for daniella-rectal abscess drainage tonight, likely will require colectomy later this week pending discussion between colorectal and IR.    Changes today:  - POD#1 for laparoscopic assisted sigmoid colectomy with end colostomy  - Holding anticoagulation  - Continuing cefepime, flagyl, PO vanc, ganciclovir  - Restarting bactrim ppx per ID  - D/c doyle    # Daniella-colonic abscess (7.8 cm) s/p laparoscopic assisted sigmoid colectomy with end colostomy 8/14  # Daniella-rectal abscess (2.7 cm) s/p drainage 8/12  # R colon colitis  # Positive c. Diff  Underwent surgical procedure, tolerated well.  Not yet passing gas.  Per discussion with surgery ok to continue critical meds PO (anti-rejections meds and vanc).  Otherwise keeping NPO today, will hold off TPN and reassess tomorrow.  Holding anticoagulation in setting of recent major surgery, will resume later given recent hx provoked R internal jugular clot.  On cefepime, flagyl, PO vanc, ganciclovir.  - Colorectal, transplant ID following, appreciate assistance    # S/p heart transplant 6/13/2018 (donor 3 vessel CAD  HSV1-, HSV1+, CMV D+/R-, EBV D?/R+, VZV+  tacro goal 8 due to infx, last heart bx 7/5/2018 was 0R  8/10/2018 bx results still pending)   - Lowered prednisone to 10/10 today given likely upcoming surgery  - Will continue tacro at 1/2 and MMF at 500 BID  - Continue statin  - Due for pentamadine 8/17  - RHC biopsy from 8/10 pending    # Recent pseudomonas bacteremia  # Necrotic mouth ulcer, resolving  Had profound neutropenia and pseudomonas bacteremia several weeks ago in associated with necrotic mouth  "ulcer; neutropenia likely immunosuppression induced.  Neutropenia resolved last admission and pseudomonal bacteremia is likely cleared.  Necrotic ulcer appears to be resolving from prior admission.  Not currently active issues but will continue to monitor.    # Pulmonary nodules  Incidental on CT, will need interval follow up in 4-6 weeks.    # Recent R internal jugular thrombus  - Holding apixaban in setting of pending surgery    # ESRD on TTS HD  - Nephrology consulted, appreciate assistance    # DM2 - SSI    FEN: NPO  PPx: Holding AC  Dispo: PT/OT to see when appropriate    Bobby Bearden MD PGY-3  Internal Medicine Resident  [phone]    Patient discussed with Dr. Blum.         Subjective:   Overnight had laparoscopic assisted sigmoid colectomy with end colostomy.  This AM states he slept ok but has ongoing pain at his surgical site.          Medications:       acetaminophen  1,000 mg Oral 4x Daily     biotin  5 mg Oral Daily     ceFEPIme (MAXIPIME) IV  1 g Intravenous Q24H     fluticasone  1-2 spray Both Nostrils Daily     ganciclovir (CYTOVENE) intermittent infusion  1.25 mg/kg Intravenous Once per day on Mon Wed Fri     insulin aspart  1-6 Units Subcutaneous Q4H     lipids  250 mL Intravenous Q24H     metroNIDAZOLE  500 mg Intravenous Q8H     mycophenolate  500 mg Oral BID IS     nystatin  1,000,000 Units Swish & Swallow 4x Daily     pantoprazole  40 mg Intravenous Q24H     predniSONE  10 mg Oral BID     rosuvastatin  20 mg Oral Daily     saccharomyces boulardii  250 mg Oral BID     sodium chloride (PF)  3 mL Intracatheter Q8H     sodium chloride (PF)  3 mL Intracatheter Q8H     tacrolimus  1 mg Oral QAM     tacrolimus  2 mg Oral QPM     triamcinolone   Topical BID     vancomycin  125 mg Oral 4x Daily       bupivacaine liposome (EXPAREL) LONG ACTING injection was administered into the infiltration site to produce postsurgical analgesia. Duration of action is up to 72 hours, and other \"judith\" " medications should not be given for 96 hours with the exception of the lidocaine 5% patch (LIDODERM) and the lidocaine 10mg in potassium infusions. This entry is for INFORMATION ONLY.       IV fluid REPLACEMENT ONLY       dextrose 10 mL/hr at 08/15/18 1347     HYDROmorphone       lactated ringers 40 mL/hr at 08/14/18 2212     - MEDICATION INSTRUCTIONS -                 Objective:          Physical Exam:   Temp:  [98  F (36.7  C)-99.2  F (37.3  C)] 98.2  F (36.8  C)  Heart Rate:  [81-85] 83  Resp:  [14-16] 16  BP: (129-153)/() 135/95  Cuff Mean (mmHg):  [112-117] 117  SpO2:  [98 %-100 %] 98 %  I/O last 3 completed shifts:  In: 573 [I.V.:573]  Out: 210 [Urine:60; Blood:150]    Vitals:    08/12/18 0228 08/14/18 0500   Weight: 75 kg (165 lb 5.5 oz) 78.8 kg (173 lb 11.6 oz)       GEN: Laying in bed, appears uncomfortable but not in acute distress  HEENT: Mouth ulcer unchanged from prior exams, no new redness/swelling  PULM: CTAB from anterior exam  CV: Regular rate and rhythm.  JVP not elevated  ABD: Colostomy site with faint surrounding redness.  Incision C/D/I.  Hypoactive bowel sounds  EXT: Trace lower extremity edema         Data:   BMP    Recent Labs  Lab 08/15/18  0638 08/14/18  0620 08/13/18 0618 08/13/18  0121    133 135 136   POTASSIUM 4.4 4.5 3.8 3.9   CHLORIDE 99 102 103 101   TRINI 7.8* 7.5* 7.9* 7.8*   CO2 25 20 24 23   BUN 30 48* 38* 34*   CR 5.20* 7.08* 5.72* 5.27*   GLC 79 106* 40* 81     LFTs    Recent Labs  Lab 08/14/18  0620 08/13/18  0618   ALKPHOS 110 99   AST 29 28   ALT 17 17   BILITOTAL 0.3 0.3   PROTTOTAL 5.3* 5.2*   ALBUMIN 1.6* 1.6*      CBC  Recent Labs  Lab 08/15/18  0638 08/14/18  0620 08/13/18  0618 08/12/18  1253   WBC 4.3 3.7* 5.2  --  4.8   RBC 3.77* 3.11* 3.00*  --  2.52*   HGB 11.0* 9.0* 8.6*  < > 7.5*   HCT 33.7* 27.7* 27.0*  --  23.7*   MCV 89 89 90  --  94   MCH 29.2 28.9 28.7  --  29.8   MCHC 32.6 32.5 31.9  --  31.6   RDW 20.3* 20.9* 21.0*  --  22.2*   * 103*  117*  --  111*   < > = values in this interval not displayed.  INR    Recent Labs  Lab 08/12/18  0834 08/12/18  0230   INR 1.31* 1.25*

## 2018-08-15 NOTE — PROGRESS NOTES
"Post Op Check    08/14/2018    Murray Nicholson is a 63 year old male with h/o diverticulitis c/b abscess now POD#0 s/p Laparoscopic Hand Assisted Takedown of Splenic Flexure, Sigmoidectomy, Small Bowel Resection, Takedown of Small Bowel to Colon Fistula.    Pt reports fatigue. Denies SOB, chest pain, or dizziness. Endorses abdominal pain well controlled with PCA.    /87  Pulse 70  Temp 98  F (36.7  C) (Axillary)  Resp 15  Ht 1.753 m (5' 9\")  Wt 78.8 kg (173 lb 11.6 oz)  SpO2 100%  BMI 25.65 kg/m2    Gen: resting comfortably, NAD  Chest: breathing non-labored on 1LPM, capno  Abdomen: soft,nondistended, appropriately tender  Stoma pink and viable, no stool or gas in bag  Incision: clean, dry, intact at midline lower incision without erythema or drainage  Old prior surgical lap incisions superior abdomen remain open without erythema or purulence  : doyle in place with minimal brian urine  Extremities: warm and well perfused    A/P: No acute post-op issues. Keep NPO. Continue plan of care per primary team. Please call with any questions.    Jackie Harper, DO  PGY-1 Surgery  Pager [phone]  (6AM-5PM please page primary team, nights/weekends/holidays page job code [phone])    "

## 2018-08-15 NOTE — PROVIDER NOTIFICATION
Capnography alarming for low RR. Writer counted RR while pt sleeping which was around 5-6/min. RR 14-16 while awake. Pt alert & oriented x4. MD notified.

## 2018-08-16 ENCOUNTER — APPOINTMENT (OUTPATIENT)
Dept: OCCUPATIONAL THERAPY | Facility: CLINIC | Age: 63
DRG: 329 | End: 2018-08-16
Attending: COLON & RECTAL SURGERY
Payer: MEDICARE

## 2018-08-16 LAB
ANION GAP SERPL CALCULATED.3IONS-SCNC: 10 MMOL/L (ref 3–14)
BACTERIA SPEC CULT: NO GROWTH
BACTERIA SPEC CULT: NO GROWTH
BASOPHILS # BLD AUTO: 0 10E9/L (ref 0–0.2)
BASOPHILS NFR BLD AUTO: 0 %
BUN SERPL-MCNC: 42 MG/DL (ref 7–30)
CALCIUM SERPL-MCNC: 7.8 MG/DL (ref 8.5–10.1)
CHLORIDE SERPL-SCNC: 101 MMOL/L (ref 94–109)
CO2 SERPL-SCNC: 24 MMOL/L (ref 20–32)
CREAT SERPL-MCNC: 6.48 MG/DL (ref 0.66–1.25)
DIFFERENTIAL METHOD BLD: ABNORMAL
EOSINOPHIL # BLD AUTO: 0 10E9/L (ref 0–0.7)
EOSINOPHIL NFR BLD AUTO: 0 %
ERYTHROCYTE [DISTWIDTH] IN BLOOD BY AUTOMATED COUNT: 20.4 % (ref 10–15)
GFR SERPL CREATININE-BSD FRML MDRD: 9 ML/MIN/1.7M2
GLUCOSE BLDC GLUCOMTR-MCNC: 140 MG/DL (ref 70–99)
GLUCOSE BLDC GLUCOMTR-MCNC: 144 MG/DL (ref 70–99)
GLUCOSE BLDC GLUCOMTR-MCNC: 160 MG/DL (ref 70–99)
GLUCOSE BLDC GLUCOMTR-MCNC: 94 MG/DL (ref 70–99)
GLUCOSE SERPL-MCNC: 98 MG/DL (ref 70–99)
HCT VFR BLD AUTO: 30.4 % (ref 40–53)
HGB BLD-MCNC: 9.9 G/DL (ref 13.3–17.7)
IMM GRANULOCYTES # BLD: 0.1 10E9/L (ref 0–0.4)
IMM GRANULOCYTES NFR BLD: 1.2 %
LYMPHOCYTES # BLD AUTO: 0.3 10E9/L (ref 0.8–5.3)
LYMPHOCYTES NFR BLD AUTO: 5.7 %
Lab: NORMAL
Lab: NORMAL
MAGNESIUM SERPL-MCNC: 2 MG/DL (ref 1.6–2.3)
MCH RBC QN AUTO: 29.4 PG (ref 26.5–33)
MCHC RBC AUTO-ENTMCNC: 32.6 G/DL (ref 31.5–36.5)
MCV RBC AUTO: 90 FL (ref 78–100)
MONOCYTES # BLD AUTO: 0.5 10E9/L (ref 0–1.3)
MONOCYTES NFR BLD AUTO: 8.7 %
NEUTROPHILS # BLD AUTO: 5.1 10E9/L (ref 1.6–8.3)
NEUTROPHILS NFR BLD AUTO: 84.4 %
NRBC # BLD AUTO: 0 10*3/UL
NRBC BLD AUTO-RTO: 0 /100
PHOSPHATE SERPL-MCNC: 5.3 MG/DL (ref 2.5–4.5)
PLATELET # BLD AUTO: 114 10E9/L (ref 150–450)
POTASSIUM SERPL-SCNC: 5.4 MMOL/L (ref 3.4–5.3)
PREALB SERPL IA-MCNC: 16 MG/DL (ref 15–45)
RBC # BLD AUTO: 3.37 10E12/L (ref 4.4–5.9)
SODIUM SERPL-SCNC: 135 MMOL/L (ref 133–144)
SPECIMEN SOURCE: NORMAL
SPECIMEN SOURCE: NORMAL
TACROLIMUS BLD-MCNC: 25.3 UG/L (ref 5–15)
TME LAST DOSE: ABNORMAL H
WBC # BLD AUTO: 6 10E9/L (ref 4–11)

## 2018-08-16 PROCEDURE — 80197 ASSAY OF TACROLIMUS: CPT | Performed by: INTERNAL MEDICINE

## 2018-08-16 PROCEDURE — A9270 NON-COVERED ITEM OR SERVICE: HCPCS | Mod: GY | Performed by: STUDENT IN AN ORGANIZED HEALTH CARE EDUCATION/TRAINING PROGRAM

## 2018-08-16 PROCEDURE — 99233 SBSQ HOSP IP/OBS HIGH 50: CPT | Mod: GC | Performed by: INTERNAL MEDICINE

## 2018-08-16 PROCEDURE — 25000125 ZZHC RX 250: Performed by: COLON & RECTAL SURGERY

## 2018-08-16 PROCEDURE — 63400005 ZZH RX 634

## 2018-08-16 PROCEDURE — 25000132 ZZH RX MED GY IP 250 OP 250 PS 637: Mod: GY | Performed by: INTERNAL MEDICINE

## 2018-08-16 PROCEDURE — 00000146 ZZHCL STATISTIC GLUCOSE BY METER IP

## 2018-08-16 PROCEDURE — 97530 THERAPEUTIC ACTIVITIES: CPT | Mod: GO

## 2018-08-16 PROCEDURE — 80048 BASIC METABOLIC PNL TOTAL CA: CPT | Performed by: COLON & RECTAL SURGERY

## 2018-08-16 PROCEDURE — 25000132 ZZH RX MED GY IP 250 OP 250 PS 637: Mod: GY | Performed by: STUDENT IN AN ORGANIZED HEALTH CARE EDUCATION/TRAINING PROGRAM

## 2018-08-16 PROCEDURE — 25000125 ZZHC RX 250: Performed by: STUDENT IN AN ORGANIZED HEALTH CARE EDUCATION/TRAINING PROGRAM

## 2018-08-16 PROCEDURE — 25000131 ZZH RX MED GY IP 250 OP 636 PS 637: Mod: GY | Performed by: INTERNAL MEDICINE

## 2018-08-16 PROCEDURE — 21400006 ZZH R&B CCU INTERMEDIATE UMMC

## 2018-08-16 PROCEDURE — A9270 NON-COVERED ITEM OR SERVICE: HCPCS | Mod: GY | Performed by: INTERNAL MEDICINE

## 2018-08-16 PROCEDURE — 36415 COLL VENOUS BLD VENIPUNCTURE: CPT | Performed by: COLON & RECTAL SURGERY

## 2018-08-16 PROCEDURE — 94660 CPAP INITIATION&MGMT: CPT

## 2018-08-16 PROCEDURE — 40000133 ZZH STATISTIC OT WARD VISIT

## 2018-08-16 PROCEDURE — 83735 ASSAY OF MAGNESIUM: CPT | Performed by: COLON & RECTAL SURGERY

## 2018-08-16 PROCEDURE — 25000131 ZZH RX MED GY IP 250 OP 636 PS 637: Mod: GY | Performed by: STUDENT IN AN ORGANIZED HEALTH CARE EDUCATION/TRAINING PROGRAM

## 2018-08-16 PROCEDURE — 25000128 H RX IP 250 OP 636

## 2018-08-16 PROCEDURE — 85025 COMPLETE CBC W/AUTO DIFF WBC: CPT | Performed by: COLON & RECTAL SURGERY

## 2018-08-16 PROCEDURE — A9270 NON-COVERED ITEM OR SERVICE: HCPCS | Mod: GY | Performed by: COLON & RECTAL SURGERY

## 2018-08-16 PROCEDURE — 90937 HEMODIALYSIS REPEATED EVAL: CPT

## 2018-08-16 PROCEDURE — 40000903 ZZH STATISTIC WOC PT EDUCATION, 31-45 MIN

## 2018-08-16 PROCEDURE — 84100 ASSAY OF PHOSPHORUS: CPT | Performed by: COLON & RECTAL SURGERY

## 2018-08-16 PROCEDURE — 40000275 ZZH STATISTIC RCP TIME EA 10 MIN

## 2018-08-16 PROCEDURE — 25000128 H RX IP 250 OP 636: Performed by: STUDENT IN AN ORGANIZED HEALTH CARE EDUCATION/TRAINING PROGRAM

## 2018-08-16 PROCEDURE — 97165 OT EVAL LOW COMPLEX 30 MIN: CPT | Mod: GO

## 2018-08-16 PROCEDURE — 97110 THERAPEUTIC EXERCISES: CPT | Mod: GO

## 2018-08-16 PROCEDURE — 25000132 ZZH RX MED GY IP 250 OP 250 PS 637: Mod: GY | Performed by: COLON & RECTAL SURGERY

## 2018-08-16 RX ORDER — HYDRALAZINE HYDROCHLORIDE 50 MG/1
50 TABLET, FILM COATED ORAL ONCE
Status: COMPLETED | OUTPATIENT
Start: 2018-08-16 | End: 2018-08-16

## 2018-08-16 RX ORDER — HYDROMORPHONE HYDROCHLORIDE 1 MG/ML
.3-.5 INJECTION, SOLUTION INTRAMUSCULAR; INTRAVENOUS; SUBCUTANEOUS
Status: DISCONTINUED | OUTPATIENT
Start: 2018-08-16 | End: 2018-08-18

## 2018-08-16 RX ORDER — ENALAPRILAT 1.25 MG/ML
1.25 INJECTION INTRAVENOUS EVERY 6 HOURS
Status: DISCONTINUED | OUTPATIENT
Start: 2018-08-16 | End: 2018-08-16

## 2018-08-16 RX ORDER — PENTAMIDINE ISETHIONATE 300 MG/300MG
300 INHALANT RESPIRATORY (INHALATION) ONCE
Status: COMPLETED | OUTPATIENT
Start: 2018-08-17 | End: 2018-08-17

## 2018-08-16 RX ORDER — LINEZOLID 2 MG/ML
600 INJECTION, SOLUTION INTRAVENOUS EVERY 12 HOURS
Status: DISCONTINUED | OUTPATIENT
Start: 2018-08-16 | End: 2018-08-23

## 2018-08-16 RX ORDER — HEPARIN SODIUM 5000 [USP'U]/.5ML
5000 INJECTION, SOLUTION INTRAVENOUS; SUBCUTANEOUS EVERY 12 HOURS
Status: DISCONTINUED | OUTPATIENT
Start: 2018-08-16 | End: 2018-08-19

## 2018-08-16 RX ADMIN — Medication 0.5 MG: at 21:15

## 2018-08-16 RX ADMIN — NYSTATIN 1000000 UNITS: 100000 SUSPENSION ORAL at 16:15

## 2018-08-16 RX ADMIN — PANTOPRAZOLE SODIUM 40 MG: 40 INJECTION, POWDER, FOR SOLUTION INTRAVENOUS at 09:14

## 2018-08-16 RX ADMIN — Medication 250 MG: at 20:52

## 2018-08-16 RX ADMIN — VANCOMYCIN HYDROCHLORIDE 125 MG: KIT at 20:52

## 2018-08-16 RX ADMIN — MYCOPHENOLATE MOFETIL 500 MG: 250 CAPSULE ORAL at 09:14

## 2018-08-16 RX ADMIN — EPOETIN ALFA 7600 UNITS: 10000 SOLUTION INTRAVENOUS; SUBCUTANEOUS at 10:06

## 2018-08-16 RX ADMIN — METRONIDAZOLE 500 MG: 500 INJECTION, SOLUTION INTRAVENOUS at 06:17

## 2018-08-16 RX ADMIN — PREDNISONE 10 MG: 10 TABLET ORAL at 20:52

## 2018-08-16 RX ADMIN — Medication 5 MG: at 09:15

## 2018-08-16 RX ADMIN — HYDRALAZINE HYDROCHLORIDE 50 MG: 50 TABLET ORAL at 21:15

## 2018-08-16 RX ADMIN — Medication 0.5 MG: at 18:39

## 2018-08-16 RX ADMIN — ACETAMINOPHEN 1000 MG: 500 TABLET, FILM COATED ORAL at 20:52

## 2018-08-16 RX ADMIN — SODIUM CHLORIDE 250 ML: 9 INJECTION, SOLUTION INTRAVENOUS at 10:07

## 2018-08-16 RX ADMIN — INSULIN ASPART 1 UNITS: 100 INJECTION, SOLUTION INTRAVENOUS; SUBCUTANEOUS at 14:09

## 2018-08-16 RX ADMIN — ACETAMINOPHEN 1000 MG: 500 TABLET, FILM COATED ORAL at 09:14

## 2018-08-16 RX ADMIN — MYCOPHENOLATE MOFETIL 500 MG: 250 CAPSULE ORAL at 18:36

## 2018-08-16 RX ADMIN — INSULIN ASPART 1 UNITS: 100 INJECTION, SOLUTION INTRAVENOUS; SUBCUTANEOUS at 18:35

## 2018-08-16 RX ADMIN — PREDNISONE 10 MG: 10 TABLET ORAL at 09:11

## 2018-08-16 RX ADMIN — ROSUVASTATIN CALCIUM 20 MG: 20 TABLET, FILM COATED ORAL at 09:16

## 2018-08-16 RX ADMIN — VANCOMYCIN HYDROCHLORIDE 125 MG: KIT at 18:35

## 2018-08-16 RX ADMIN — NYSTATIN 1000000 UNITS: 100000 SUSPENSION ORAL at 20:52

## 2018-08-16 RX ADMIN — HEPARIN SODIUM 5000 UNITS: 5000 INJECTION, SOLUTION INTRAVENOUS; SUBCUTANEOUS at 16:34

## 2018-08-16 RX ADMIN — Medication: at 10:07

## 2018-08-16 RX ADMIN — VANCOMYCIN HYDROCHLORIDE 125 MG: KIT at 14:52

## 2018-08-16 RX ADMIN — LINEZOLID 600 MG: 600 INJECTION, SOLUTION INTRAVENOUS at 16:14

## 2018-08-16 RX ADMIN — METRONIDAZOLE 500 MG: 500 INJECTION, SOLUTION INTRAVENOUS at 20:51

## 2018-08-16 RX ADMIN — METRONIDAZOLE 500 MG: 500 INJECTION, SOLUTION INTRAVENOUS at 14:53

## 2018-08-16 RX ADMIN — VANCOMYCIN HYDROCHLORIDE 125 MG: KIT at 09:11

## 2018-08-16 RX ADMIN — SODIUM CHLORIDE 300 ML: 9 INJECTION, SOLUTION INTRAVENOUS at 10:07

## 2018-08-16 RX ADMIN — TACROLIMUS 1 MG: 1 CAPSULE ORAL at 09:15

## 2018-08-16 RX ADMIN — CEFEPIME HYDROCHLORIDE 1 G: 1 INJECTION, POWDER, FOR SOLUTION INTRAMUSCULAR; INTRAVENOUS at 22:31

## 2018-08-16 RX ADMIN — Medication 250 MG: at 09:11

## 2018-08-16 RX ADMIN — NYSTATIN 1000000 UNITS: 100000 SUSPENSION ORAL at 09:11

## 2018-08-16 RX ADMIN — ACETAMINOPHEN 1000 MG: 500 TABLET, FILM COATED ORAL at 16:14

## 2018-08-16 ASSESSMENT — ACTIVITIES OF DAILY LIVING (ADL)
PREVIOUS_RESPONSIBILITIES: MEAL PREP;HOUSEKEEPING;MEDICATION MANAGEMENT;FINANCES
ADLS_ACUITY_SCORE: 11
ADLS_ACUITY_SCORE: 11
ADLS_ACUITY_SCORE: 12
ADLS_ACUITY_SCORE: 11
ADLS_ACUITY_SCORE: 11
ADLS_ACUITY_SCORE: 10

## 2018-08-16 ASSESSMENT — PAIN DESCRIPTION - DESCRIPTORS
DESCRIPTORS: DISCOMFORT
DESCRIPTORS: ACHING;DISCOMFORT

## 2018-08-16 NOTE — PLAN OF CARE
Problem: Patient Care Overview  Goal: Plan of Care/Patient Progress Review  PT consult orders received and appreciated. Per OT, patient may not have acute PT needs. PT will hold evaluation pending further mobility assessment by OT to determine needs.

## 2018-08-16 NOTE — PROGRESS NOTES
Cardiology 2 Progress Note           Assessment and Plan:   Murray Nicholson is a 63 year old male with a h/o NICM s/p heart txp (6/14/18), ESRD on HD, R internal jugular thrombus on apixaban and DM2 p/w 3-4 bright red BM and fevers/chills, found to have a daniella-colonic abscess (7.8 cm), daniella-rectal abscess (2.7 cm), probable diverticulitis, and R colon colitis.  Planned for daniella-rectal abscess drainage tonight, likely will require colectomy later this week pending discussion between colorectal and IR.    Changes today:  - POD#2 for laparoscopic assisted sigmoid colectomy with end colostomy  - Holding anticoagulation  - Adding linezolid  - Continuing cefepime, flagyl, PO vanc, ganciclovir   - Restarting bactrim ppx per ID  - Holding tacro given elevated level    # Daniella-colonic abscess (7.8 cm) s/p laparoscopic assisted sigmoid colectomy with end colostomy 8/14) positive for VRE  # Daniella-rectal abscess (2.7 cm) s/p drainage 8/12  # R colon colitis  # Positive c. Diff    Addressing nutrition today; has not passed flatus/stool this AM.  Would plan to start TPN tomorrow if not able to advance diet.  Starting subcutaneous heparin today.  Did have provoked R internal jugular line-associated clot  On cefepime, flagyl, PO vanc, ganciclovir.  - Colorectal, transplant ID following, appreciate assistance    # S/p heart transplant 6/13/2018 (donor 3 vessel CAD  HSV1-, HSV1+, CMV D+/R-, EBV D?/R+, VZV+  tacro goal 8 due to infx, last heart bx 7/5/2018 was 0R  8/10/2018 bx results still pending)   - Lowered prednisone to 10/10 today given likely upcoming surgery  - Continue MMF, holding tacro  - Continue statin  - Due for pentamadine 8/17  - RHC biopsy from 8/10 pending    # Recent pseudomonas bacteremia  # Necrotic mouth ulcer, resolving  Had profound neutropenia and pseudomonas bacteremia several weeks ago in associated with necrotic mouth ulcer; neutropenia likely immunosuppression induced.  Neutropenia resolved last admission  and pseudomonal bacteremia is likely cleared.  Necrotic ulcer appears to be resolving from prior admission.  Not currently active issues but will continue to monitor.    # Pulmonary nodules  Incidental on CT, will need interval follow up in 4-6 weeks.    # Recent R internal jugular thrombus  - Holding apixaban in setting of recent surgery    # ESRD on TTS HD  - Nephrology consulted, appreciate assistance    # DM2 - SSI    FEN: NPO  PPx: Holding AC  Dispo: PT/OT to see when appropriate    Bobby Bearden MD PGY-3  Internal Medicine Resident  [phone]    Patient discussed with Dr. Blum.         Subjective:   Overnight no acute events.  This AM attempting to get out of bed more frequently, notes he had trouble sleeping due to his monitoring beeping.          Medications:       sodium chloride 0.9%  250 mL Intravenous Once in dialysis     sodium chloride 0.9%  300 mL Hemodialysis Machine Once     acetaminophen  1,000 mg Oral 4x Daily     biotin  5 mg Oral Daily     ceFEPIme (MAXIPIME) IV  1 g Intravenous Q24H     epoetin emily (EPOGEN,PROCRIT) inj ESRD  7,600 Units Intravenous Once in dialysis     fluticasone  1-2 spray Both Nostrils Daily     ganciclovir (CYTOVENE) intermittent infusion  1.25 mg/kg Intravenous Once per day on Mon Wed Fri     gelatin absorbable  1 each Topical During Hemodialysis (from stock)     insulin aspart  1-6 Units Subcutaneous Q4H     lipids  250 mL Intravenous Q24H     metroNIDAZOLE  500 mg Intravenous Q8H     mycophenolate  500 mg Oral BID IS     - MEDICATION INSTRUCTIONS -   Does not apply Once     nystatin  1,000,000 Units Swish & Swallow 4x Daily     pantoprazole  40 mg Intravenous Q24H     predniSONE  10 mg Oral BID     rosuvastatin  20 mg Oral Daily     saccharomyces boulardii  250 mg Oral BID     sodium chloride (PF)  3 mL Intracatheter During Hemodialysis (from stock)     sodium chloride (PF)  3 mL Intracatheter During Hemodialysis (from stock)     sodium chloride (PF)  3 mL  "Intracatheter Q8H     sodium chloride (PF)  3 mL Intracatheter Q8H     sulfamethoxazole-trimethoprim  1 tablet Oral Once per day on Tue Thu Sat     tacrolimus  1 mg Oral QAM     tacrolimus  2 mg Oral QPM     triamcinolone   Topical BID     vancomycin  125 mg Oral 4x Daily       bupivacaine liposome (EXPAREL) LONG ACTING injection was administered into the infiltration site to produce postsurgical analgesia. Duration of action is up to 72 hours, and other \"judith\" medications should not be given for 96 hours with the exception of the lidocaine 5% patch (LIDODERM) and the lidocaine 10mg in potassium infusions. This entry is for INFORMATION ONLY.       IV fluid REPLACEMENT ONLY       dextrose 10 mL/hr at 08/15/18 1347     HYDROmorphone       lactated ringers 40 mL/hr at 08/14/18 2212     - MEDICATION INSTRUCTIONS -                 Objective:          Physical Exam:   Temp:  [98.2  F (36.8  C)-98.7  F (37.1  C)] 98.5  F (36.9  C)  Heart Rate:  [80-83] 80  Resp:  [16] 16  BP: (135-176)/() 176/124  Cuff Mean (mmHg):  [116-148] 148  SpO2:  [97 %-100 %] 100 %       Vitals:    08/12/18 0228 08/14/18 0500   Weight: 75 kg (165 lb 5.5 oz) 78.8 kg (173 lb 11.6 oz)       GEN: Laying in bed, appears fatigued but not in acute distress  HEENT: Mouth ulcer unchanged from prior exams, no new redness/swelling  PULM: CTAB from anterior exam  CV: Regular rate and rhythm.  JVP not elevated  ABD: Colostomy site without surrounding redness.  Incision C/D/I.  Hypoactive bowel sounds  EXT: Trace lower extremity edema         Data:   BMP    Recent Labs  Lab 08/16/18  0638 08/15/18  0638 08/14/18  0620 08/13/18  0618    134 133 135   POTASSIUM 5.4* 4.4 4.5 3.8   CHLORIDE 101 99 102 103   TRINI 7.8* 7.8* 7.5* 7.9*   CO2 24 25 20 24   BUN 42* 30 48* 38*   CR 6.48* 5.20* 7.08* 5.72*   GLC 98 79 106* 40*     LFTs    Recent Labs  Lab 08/14/18  0620 08/13/18  0618   ALKPHOS 110 99   AST 29 28   ALT 17 17   BILITOTAL 0.3 0.3   PROTTOTAL 5.3* " 5.2*   ALBUMIN 1.6* 1.6*      CBC    Recent Labs  Lab 08/16/18  0638 08/15/18  0638 08/14/18  0620 08/13/18  0618   WBC 6.0 4.3 3.7* 5.2   RBC 3.37* 3.77* 3.11* 3.00*   HGB 9.9* 11.0* 9.0* 8.6*   HCT 30.4* 33.7* 27.7* 27.0*   MCV 90 89 89 90   MCH 29.4 29.2 28.9 28.7   MCHC 32.6 32.6 32.5 31.9   RDW 20.4* 20.3* 20.9* 21.0*   * 134* 103* 117*     INR    Recent Labs  Lab 08/12/18  0834 08/12/18  0230   INR 1.31* 1.25*

## 2018-08-16 NOTE — PROGRESS NOTES
08/16/18 0800   Quick Adds   Type of Visit Initial Occupational Therapy Evaluation   Living Environment   Lives With alone   Living Arrangements apartment   Home Accessibility stairs to enter home;tub/shower is not walk in;bed and bath on same level   Number of Stairs to Enter Home 24   Number of Stairs Within Home 0   Stair Railings at Home outside, present on right side   Transportation Available car;family or friend will provide   Living Environment Comment Pt lives alone in an apartment on the 2nd floor, reports there are 24 stairs to climb to reach his apartment.    Self-Care   Dominant Hand right   Usual Activity Tolerance good   Current Activity Tolerance fair   Regular Exercise yes   Activity/Exercise Type other (see comments)  (cardiac rehab )   Equipment Currently Used at Home walker, rolling   Activity/Exercise/Self-Care Comment Pt states he has been doing pretty well overall at home, occasionally uses walker for longer distances. Also states he has not been to cardiac rehab consistently 2/2 multiple other Dr appointments.    Functional Level Prior   Ambulation 1-->assistive equipment   Transferring 0-->independent   Toileting 0-->independent   Bathing 0-->independent   Dressing 0-->independent   Eating 0-->independent   Communication 0-->understands/communicates without difficulty   Swallowing 0-->swallows foods/liquids without difficulty   Cognition 0 - no cognition issues reported   Fall history within last six months yes   Number of times patient has fallen within last six months 1   Which of the above functional risks had a recent onset or change? ambulation;transferring;toileting;bathing;dressing   Prior Functional Level Comment Pt states he was mod-I in functional mobility and ADLs prior to hospital admission.    General Information   Onset of Illness/Injury or Date of Surgery - Date 08/12/18   Referring Physician Rick Tran MD   Patient/Family Goals Statement return home     Additional Occupational Profile Info/Pertinent History of Current Problem 63 year old male h/o heart TXP 6/2018 on immunosuppressants, ESRD on HD, R IJ thrombus on apixabam (held, last dose 2d ago), T2DM, ongoing pseudomonal bacteremia p/w abd pain & BRBPR w/first episode of diverticulitis c/b paracolonic abscess seen on CT. POD #2 Laparoscopic Hand Assisted Takedown of Splenic Flexure, Sigmoidectomy, Small Bowel Resection, Takedown of Small Bowel to Colon Fistula.    Precautions/Limitations fall precautions;abdominal precautions   Weight-Bearing Status - LUE other (see comments)  (10#)   Weight-Bearing Status - RUE other (see comments)  (10#)   Weight-Bearing Status - LLE full weight-bearing   Weight-Bearing Status - RLE full weight-bearing   General Info Comments Activity: ambulate with assist    Cognitive Status Examination   Orientation orientation to person, place and time   Level of Consciousness alert   Able to Follow Commands WNL/WFL   Personal Safety (Cognitive) WNL/WFL   Memory intact   Attention No deficits were identified   Cognitive Comment no cognition concerns noted    Visual Perception   Visual Perception No deficits were identified;Wears glasses   Sensory Examination   Sensory Quick Adds No deficits were identified   Pain Assessment   Patient Currently in Pain Yes, see Vital Sign flowsheet   Integumentary/Edema   Integumentary/Edema no deficits were identifed   Posture   Posture forward head position;protracted shoulders   Range of Motion (ROM)   ROM Comment BUEs WFL    Strength   Strength Comments BUEs WFL    Hand Strength   Hand Strength Comments Bilateral  strength WFL    Coordination   Upper Extremity Coordination No deficits were identified   Gross Motor Coordination No deficits identified    Fine Motor Coordination Not tested    Mobility   Bed Mobility Comments SBA    Transfer Skill: Bed to Chair/Chair to Bed   Level of [place]: Bed to Chair contact guard   Transfer Skill: Sit to  "Stand   Level of [place]: Sit/Stand minimum assist (75% patients effort)   Balance   Balance Comments Pt mildly unsteady on feet 2/2 weakness and pain, no overt LOB noted.    Instrumental Activities of Daily Living (IADL)   Previous Responsibilities meal prep;housekeeping;medication management;finances   IADL Comments Pt reports he has not driven after heart transplant, pts children assist with transportation, laundry, and grocery shopping.    Activities of Daily Living Analysis   Impairments Contributing to Impaired Activities of Daily Living strength decreased;pain;post surgical precautions;flexibility decreased   General Therapy Interventions   Planned Therapy Interventions ADL retraining;IADL retraining   Clinical Impression   Criteria for Skilled Therapeutic Interventions Met yes, treatment indicated   OT Diagnosis Decreased ADL-I    Influenced by the following impairments general deconditioning, fatigue, pain, and post-surgical precautions    Assessment of Occupational Performance 3-5 Performance Deficits   Identified Performance Deficits ambulation, transferring, dressing, bathing, toileting    Clinical Decision Making (Complexity) Low complexity   Therapy Frequency 5 times/wk   Predicted Duration of Therapy Intervention (days/wks) 8/23/2018   Anticipated Discharge Disposition Home with Home Therapy;Transitional Care Facility   Risks and Benefits of Treatment have been explained. Yes   Patient, Family & other staff in agreement with plan of care Yes   Clinical Impression Comments Pt presents to OT today with general deconditioning, fatigue, pain, and post-surgical precautions, all leading to decreased ADL-I. Pt to benefit from skilled OT services to address the following problem list.    [facility] AM-PAC  \"6 Clicks\" Daily Activity Inpatient Short Form   1. Putting on and taking off regular lower body clothing? 3 - A Little   2. Bathing (including washing, rinsing, drying)? 3 - A Little   3. " Toileting, which includes using toilet, bedpan or urinal? 3 - A Little   4. Putting on and taking off regular upper body clothing? 3 - A Little   5. Taking care of personal grooming such as brushing teeth? 4 - None   6. Eating meals? 4 - None   Daily Activity Raw Score (Score out of 24.Lower scores equate to lower levels of function) 20   Total Evaluation Time   Total Evaluation Time (Minutes) 5

## 2018-08-16 NOTE — PLAN OF CARE
Problem: Patient Care Overview  Goal: Plan of Care/Patient Progress Review  Outcome: No Change  D: S/p Colectomy POD 2. Heart transplant June 26  I/A: VSS, slightly hypertensive. MD aware. Pain managed with Dilaudid PCA. Sinus rhythm. NPO except meds and ice chips. Patient very irritable this shift due to many alarms and staff in his room. Capnography alarmed frequently due to patient's SHEELA which is a little worse than normal because of the PCA. Attempted using CPAP and patient could not tolerate. Patient opted to give up the PCA button for the rest of the night. Monitored O2 sats and EKG. Sats have stayed % on RA all night.  MD was notified of patient's decision. D10 continues. Lactated Ringers continues at 40 ml/hr. Colostomy intact with small amount of clear, brownish fluid in it.   P: Continue to monitor. Patient is scheduled for dialysis today.

## 2018-08-16 NOTE — PROGRESS NOTES
Nephrology Progress Note  08/16/2018       Murray Nicholson is a 63 year old male with Heart transplant 6/18, ESRD on HD, HTN, Right internal jugular thrombus on Aphixaban, recent admission 7/26-8/6/18  for pseudomonas bacteremia felt to be 2/2 probable prececal colitis and necrotic mouth ulcer, admitted 8/12/18 with abdominal pain, bloody stool and fever, found to have C diff infection, anorectal abscess and pericolonic abscess. Last HD 8/11/18.         ASSESSMENT AND RECOMMENDATIONS:       1. ESRD - Etiology of ESRD 2/2 HTN, DM, CRS.   Has chronic OP HD at [facility], TTS schedule, under care of Dr Mcpherson.    - Dialysis orders: Right TDC, EDW 76 kg, Run time 4 hrs   - Will continue TTS schedule      2. Volume status - Pre run weight 79.1 kg standing. EDW 76.0 kg. No edema/dyspnea, but remains on supplemental oxygen. -170/. Is anuric.      - Will UF today as tolerated to EDW   - Continue pre run weights, standing   - Strict I/O      3. HTN - Elevated pre run today w/o -170/, but up 3 kg from EDW. OP antihypertensive regimen:  Clonidine 0.1 mg HS, Hydralazine 50 mg QID, Lisinopril 20 mg every day   - All currently on hold and remains NPO   - Should improve with fluid removal   - If not improved tomorrow will start low dose IV enalapril.       4. Transplant IS regimen: Tac, MMF, Pred. TAC 8.4      5. Electrolytes - Pre run K 5.4, Na 135      6. Acid base - No acute concerns. Bicarb 25      7. BMD - corrected Ca 9.6, Phos 5.3, albumin 1.6   - Vit D/PTH being managed in OP HD unit      8. Anemia - Hgb 9.9 post op. On Epo 7600 q run. Last RBC 8/12      9. Pericolonic abscess, small perircal abscess 2.7 cm.- Underwent I/D anorectal abscess on 8/13 and  Sigmoidectomy/end colostomy on 8/14. Also has C diff. Current abx: Po Vanco, Flagyl, Maxipime and Ganciclovir.    - BC neg, Abdominal fluid cx results including light growth VRE, Citrobacter, Klebsiella, Pseudomonas. WBC 6.0, Afebrile      10. Lung  "nodules - New Right lower lobe pulmonary nodules seen on CT this admission. ID recommending close f/u.       Recommendations were communicated to primary team via progress note      Patricia Cook, NP   [phone]      Interval History :     Seen on HD    Tolerating HD w/o complication  POD #2 for sigmoidectomy/end colostomy  Pain is controlled  Interval progress notes, imaging studies and labs reviewed      Review of Systems:   I reviewed the following systems:  GI: NPO  Neuro:  no confusion  Constitutional:  no fever or chills  CV: denies dyspnea, CP or edema.   : Minimal urine    Physical Exam:       BP (!) 143/100  Pulse 70  Temp 98  F (36.7  C) (Oral)  Resp 16  Ht 1.753 m (5' 9\")  Wt 79.1 kg (174 lb 6.1 oz)  SpO2 100%  BMI 25.75 kg/m2     GENERAL APPEARANCE: Comfortable on HD  EYES: no scleral icterus, pupils equal  Pulmonary: lungs CTA. Breathing is non labored. On supplemental oxygen  CV: RRR   - Edema - none  GI: soft, appropriately tender. Colostomy/stoma pink  MS: no evidence of inflammation in joints, no muscle tenderness  SKIN: no rash, warm, dry, no cyanosis  NEURO: alert/oriented, has bilateral hand tremors  ACCESS: Right TDC    Labs:   All labs reviewed by me  Electrolytes/Renal -   Recent Labs   Lab Test  08/16/18   0638  08/15/18   0638  08/14/18   0620   NA  135  134  133   POTASSIUM  5.4*  4.4  4.5   CHLORIDE  101  99  102   CO2  24  25  20   BUN  42*  30  48*   CR  6.48*  5.20*  7.08*   GLC  98  79  106*   TRINI  7.8*  7.8*  7.5*   MAG  2.0  1.9  2.5*   PHOS  5.3*  5.0*  5.7*       CBC -   Recent Labs   Lab Test  08/16/18   0638  08/15/18   0638  08/14/18   0620   WBC  6.0  4.3  3.7*   HGB  9.9*  11.0*  9.0*   PLT  114*  134*  103*       LFTs -   Recent Labs   Lab Test  08/14/18   0620  08/13/18   0618  08/08/18   0921  07/29/18   0523   ALKPHOS  110  99  156*  106   BILITOTAL  0.3  0.3  0.4  0.4   ALT  17  17  22  21   AST  29  28  34  26   PROTTOTAL  5.3*  5.2*   --   5.2*   ALBUMIN  " "1.6*  1.6*   --   1.9*       Iron Panel -   Recent Labs   Lab Test  07/10/18   0711  05/08/18   0954  07/19/17   1306   IRON  66  58  46   IRONSAT  28  27  18   ALBERTINA  771*  621*  369       Current Medications:    acetaminophen  1,000 mg Oral 4x Daily     biotin  5 mg Oral Daily     ceFEPIme (MAXIPIME) IV  1 g Intravenous Q24H     fluticasone  1-2 spray Both Nostrils Daily     ganciclovir (CYTOVENE) intermittent infusion  1.25 mg/kg Intravenous Once per day on Mon Wed Fri     gelatin absorbable  1 each Topical During Hemodialysis (from stock)     insulin aspart  1-6 Units Subcutaneous Q4H     lipids  250 mL Intravenous Q24H     metroNIDAZOLE  500 mg Intravenous Q8H     mycophenolate  500 mg Oral BID IS     nystatin  1,000,000 Units Swish & Swallow 4x Daily     pantoprazole  40 mg Intravenous Q24H     predniSONE  10 mg Oral BID     rosuvastatin  20 mg Oral Daily     saccharomyces boulardii  250 mg Oral BID     sodium chloride (PF)  3 mL Intracatheter During Hemodialysis (from stock)     sodium chloride (PF)  3 mL Intracatheter During Hemodialysis (from stock)     sodium chloride (PF)  3 mL Intracatheter Q8H     sodium chloride (PF)  3 mL Intracatheter Q8H     sulfamethoxazole-trimethoprim  1 tablet Oral Once per day on Tue Thu Sat     tacrolimus  1 mg Oral QAM     tacrolimus  2 mg Oral QPM     triamcinolone   Topical BID     vancomycin  125 mg Oral 4x Daily       bupivacaine liposome (EXPAREL) LONG ACTING injection was administered into the infiltration site to produce postsurgical analgesia. Duration of action is up to 72 hours, and other \"judith\" medications should not be given for 96 hours with the exception of the lidocaine 5% patch (LIDODERM) and the lidocaine 10mg in potassium infusions. This entry is for INFORMATION ONLY.       IV fluid REPLACEMENT ONLY       dextrose 10 mL/hr at 08/15/18 1347     lactated ringers 40 mL/hr at 08/14/18 2212     - MEDICATION INSTRUCTIONS -       Patricia Cook NP  "

## 2018-08-16 NOTE — PROGRESS NOTES
HEMODIALYSIS TREATMENT NOTE    Date: 8/16/2018  Time: 2:59 PM    Data:  Pre Wt: 79.1 kg (174 lb 6.1 oz)   Desired Wt: 76.1 kg   Post Wt: 76.1 kg (167 lb 12.3 oz)  Weight gain: -3 kg   Weight change: 3 kg  Ultrafiltration - Post Run Net Total Removed (mL): 3000 mL  Ultrafiltration - Post Run Net Total Gain (mL): 0 mL  Vascular Access Status: Yes, secured and visible  Dialyzer Rinse: Streaked  Total Blood Volume Processed: 77.6  Total Dialysis (Treatment) Time:  4 hours    Lab:   No    Interventions:Assessment:  Pt tolerated tx well. CVC utilized without complication. 3L of fluid obtained without c/o cramping or nausea. VSS throughout tx. Epogen given (see MAR). CVC lumens saline locked. Hand off report given to primary nurse.      Plan:    Per nephrology team.

## 2018-08-16 NOTE — PLAN OF CARE
Problem: Patient Care Overview  Goal: Plan of Care/Patient Progress Review  Discharge Planner OT   Patient plan for discharge: Not discussed this session.   Current status: Eval completed and tx initiated. Pt completed bed mobility with CGA using log roll technique. Pt completed sit<>stand transfer from EOB with Ilsa. Pt then ambulated with CGA and fww from room throughout 6C hallways, ambulated at slow pace 2/2 increased pain in abdomen. Pt took one seated rest break in hallway to recover, pt completed ~200 feet total. Pt fatigues easily with activity but did tolerate well.  Barriers to return to prior living situation: deconditioning, fatigue, pain, post-surgical precautions   Recommendations for discharge: TCU vs home with assist and HH therapy pending progress in IP therapy   Rationale for recommendations: Pt would benefit from additional therapy to increase safety and independence with ADLs/IADLs and functional mobility.        Entered by: Elma Mike 08/16/2018 2:50 PM

## 2018-08-16 NOTE — PROGRESS NOTES
"  WO Nurse Inpatient [facility]   WO Nurse Inpatient Adult     Follow Up Assessment   Assessment of new end Colostomy Stoma complication(s) none   Mucocutaneous junction; intact   Peristomal complication(s) none   Pouch wear time:TBA  Following today's visit:Patient /   is  able to demonstrate;       1. How to empty their pouch? no      2. How to change their pouch?  no    Objective data:  Patient history according to medical record: 63 year old male- POD#2 (8/14/18) s/p laparoscopic assisted sigmoid colectomy with end colostomy- POD #4 s/p perirectal abscess I&D. Pt has h/o heart TXP 6/2018 on immunosuppressants, ESRD on HD, R IJ thrombus on apixabam (held, last dose 2d ago), T2DM, ongoing pseudomonal bacteremia p/w abd pain & BRBPR w/first episode of diverticulitis c/b paracolonic abscess seen on CT. Current Diet/Nutrition:   Active Diet Order      NPO for Medical/Clinical Reasons Except for: Meds, Ice Chips   TPN no   I/O last 3 completed shifts:  In: 0   Out: 3000 [Other:3000]  Labs:    Recent Labs  Lab 08/16/18  0638  08/14/18  0620  08/12/18  0834   ALBUMIN  --   --  1.6*  < >  --    HGB 9.9*  < > 9.0*  < > 7.4*   INR  --   --   --   --  1.31*   WBC 6.0  < > 3.7*  < > 4.9   CRP  --   --  230.0*  --   --    < > = values in this interval not displayed.     Physical Exam:  Current pouching system:Shashi 2 piece flat pouching system placed in OR with slightly increased melting from 2-10 o'clock   Reason for pouch change today: ostomy education and routine schedule  Stoma appearance: pink-red, oval and moist  Stoma size; 1 1/8\" x 1 5/8\" with adequate protrusion ,    Peristomal skin: intact and sutures present  Stoma output :clear and serosanguinous bowel sweat  Abdominal  Assessment  soft , NG still in place? No  Surgical Site: skin glue intact   Pain: Dull ache and Gnawing  Is patient still on a PCA Yes    Interventions:  Patient's chart evaluated.  Focus of today's visit: initial fitting and " pouch change demonstration    Participant of teaching session today patient   Orders: Reviewed  Change made with ostomy management today: Yes  Patient/family: observing  Supplies:at bedside    Plan:  Learning needs: pouch change return demonstration, verbal instruction , diet and hydration , pouch emptying, output measurement, odor/flatus management, lifestyle adjustments and discharge instructions  Preparation for discharge: No discharge preparations started  Recommend home care? yes    Discussed plan of care with Patient  Nursing to notify the Provider(s) and re-consult the WOC Nurse if new ostomy concerns or discharge planned before next planned WOC visit.    WOC Nurse will return: marion  Face to face time: 35 minutes

## 2018-08-16 NOTE — PROGRESS NOTES
Colorectal Surgery Progress Note  POD#2    Subjective:  No acute events overnight. PCA stopped overnight due to Capno alarming frequently. No alternative pain regimen provided. Did not tolerate CPAP. Afebrile, hypertensive to BP 170s/120s. Hypoglycemic to BG 60s requiring D10.  Tolerating sips of liquids with meds without nausea or vomiting. No flatus or stool into ostomy bag. Makes minimal urine, with plan for dialysis run today.     Vitals:  Vitals:    08/15/18 1543 08/15/18 1945 08/16/18 0000 08/16/18 0719   BP: (!) 135/95 (!) 141/101 (!) 176/124 (!) 169/114   BP Location: Left arm  Left arm Left arm   Pulse:       Resp: 16 16 16 16   Temp: 98.2  F (36.8  C) 98.5  F (36.9  C) 98.5  F (36.9  C) 97.9  F (36.6  C)   TempSrc: Oral Oral Oral Oral   SpO2: 98% 97% 100% 94%   Weight:       Height:           I/O:       Physical Exam:  Gen: AAOx3, NAD  Pulm: Non-labored breathing on room air  Abd: Soft, non-distended, appropriately tender, no guarding   Incision C/D/I with dermabond at low midline incision, without erythema or drainage   Stoma pink and viable with no stool or gas in bag, minimal bowel sweat  Ext:  Warm and well-perfused, tremulous hands    BMP  Recent Labs  Lab 08/16/18  0638 08/15/18  0638 08/14/18  0620 08/13/18  0618  08/12/18  0834    134 133 135  < > 138   POTASSIUM 5.4* 4.4 4.5 3.8  < > 3.5   CHLORIDE 101 99 102 103  < > 101   CO2 24 25 20 24  < > 25   BUN 42* 30 48* 38*  < > 24   CR 6.48* 5.20* 7.08* 5.72*  < > 4.01*   GLC 98 79 106* 40*  < > 108*   MAG 2.0 1.9 2.5* 2.5*  < > 1.4*   PHOS 5.3* 5.0* 5.7*  --   --  2.6   < > = values in this interval not displayed.  CBC    Recent Labs  Lab 08/15/18  0638 08/14/18  0620 08/13/18  0618 08/13/18  0121 08/12/18  1253   WBC 4.3 3.7* 5.2  --  4.8   HGB 11.0* 9.0* 8.6* 9.0* 7.5*   HCT 33.7* 27.7* 27.0*  --  23.7*   * 103* 117*  --  111*           ASSESSMENT:  63 year old male h/o heart TXP 6/2018 on immunosuppressants, ESRD on HD, R IJ thrombus  on apixabam (held, last dose 2d ago), T2DM, ongoing pseudomonal bacteremia p/w abd pain & BRBPR w/first episode of diverticulitis c/b paracolonic abscess seen on CT. CT reviewed, LLQ 8-cm paracolonic abscess. Appears to be surrounded by SB, and may have a component of intramural abscess with stool in its interior. Sigmoid appears to be moderately inflamed, but there is some colon wall inflammation involving the ascending colon as well. Co-incidentally there is a right-sided anorectal abscess as well. His anorectal exam confirmed this. Most likely diverticulitis but may also be perforated stercoral ulcer, or complication of CMV colitis, C Diff colitis or ischemic colitis.      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 Hgb if started and monitor for bleeding while at therapeutic level; if stable then transition to home apixaban tomorrow   - Please continue cefepime and flagyl for intra-abdominal contamination for at least 48 hours post-op, defer final duration to primary/ID  - WOCN teaching     Appreciate excellent cares by primary team. Colorectal will continue to follow. Please call with any questions or concerns.        Jackie Harper DO  General Surgery PGY-1  Colon and Rectal Surgery Service  [phone]    Patient was seen with fellow who will discuss with staff.      Attestation:  This patient has been seen and evaluated by me.  Discussed with the house staff team or resident(s) and agree with the findings and plan in this note.  Russel Baron MD  Professor of Surgery  Chief, Division of Colon and Rectal Surgery  [phone]

## 2018-08-16 NOTE — PLAN OF CARE
Problem: Patient Care Overview  Goal: Plan of Care/Patient Progress Review  Outcome: Improving  D: Patient with NICM s/p heart tx 6/14/18 now admitted 8/12/18 for daniella-colonic abscess, daniella-rectal abscess, diverticulitis, and R colon colitis s/p colectomy/colostomy placement (POD2).    I: Monitored vitals and assessed patient status. A0x4. VSS. Afebrile. NPO except meds. No stool from colostomy. No audible bowel sounds or gas. Up with 1 assist.    Completed: Dialysis (3L removed), PCA discontinued  Running: D10 and LR's  PRN: IV Hydromorphone    P: Continue to monitor patient status and report changes to treatment team. Reassess tomorrow on need for TPN.

## 2018-08-16 NOTE — PROGRESS NOTES
[facility]  Transplant Infectious Disease Progress Note     Patient:  Murray Nicholson, Date of birth 1955, Medical record number [id number]  Date of Visit:  08/16/2018         Assessment and Recommendations:   NEW RECOMMENDATIONS:  -Begin linezolid 600mg IV BID for treatment of probable VRE, grown out on abdominal fluid cx's obtained in OR on 8/14/18. Appropriate to transition over to PO once patient is tolerating oral intake    Continue with previous recommendations, as follows:  - Continue cefepime for coverage of citrobacter freundii complex, klebsiella pneumoniae, and pseudomonas aeruginosa, all grown out from abdominal fluid obtained in OR on 8/14/18. Despite absence of anaerobic culture growth from same sample, would continue treating with empiric IV flagyl given persistent concern for involvement of enteric anaerobic organisms  - Continue secondary prophylaxis with PO vancomycin for C difficile  - Continue ganciclovir induction therapy dosing for ganciclovir 1.25mg/kg three times weekly for possible CMV disease with monitoring of CBC diff while on therapy.  - Once patient is off cefepime therapy, we would recommend checking surveillance blood cultures 1 and 2 weeks after cessation of therapy to document clearance of the prior bacteremia.  - For PJP prophylaxis, continue bactrim regimen 1 SS tab three times weekly (HD dosing) since leukopenia is resolved.  - For small pulmonary nodules, we would recommend sooner interval imaging in transplant patient with repeat CT in 4-6 weeks.  - Continue following 8/14/18 OR cultures (aerobic, anaerobic) for further speciation and susceptibility information. Also follow the histopathology to look for evidence of CMV     Assessment:  Mr. Nicholson is a 63-year-old gentleman with history of ischemic/valvular cardiomyopathy s/p OHT on 6/14/18 (induction with solumedrol and maintenance with tacrolimus, MMF, and prednisone), ESRD on TThSa hemodialysis  currently listed for kidney transplantation, history of polymicrobial peritonitis with Candida glabrata when previously on peritoneal dialysis in 2018, recent Pseudomonas aeruginosa bacteremia on 18 with retained dialysis catheter, hypertension, hyperlipidemia, and type 2 diabetes who was admitted on  with subjective fevers, severe lower abdominal pain, and bloody stools.  Now C diff positive.  He is post-op day #2 s/p laparoscopic abscess drainage, sigmoidectomy, and partial small bowel resection, with fluid samples obtained in the OR now growing out citrobacter freundii complex, probable VRE, klebsiella pneumoniae, and pseudomonas aeruginosa.     ID issues include the followin. 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  Throughout this time period, he had been on broad-spectrum coverage with cefepime which should suppress many enteric organisms though with holes in anaerobic coverage that could explain progression of symptoms. Taken to the OR on 18 for laparoscopic drainage of pericolonic and perirectal abscesses, as well as sigmoidectomy, end colostomy, and partial small bowel resection. Patient has remained afebrile in post-op period on empiric cefepime and flagyl, with subjective improvement in abdominal pain. Fluid cultures obtained in the OR (18) have since grown out citrobacter freundi complex, probable VRE, klebsiella pneumoniae, and pseudomonas aeruginosa. No anaerobic growth on cultures, though continue to have high suspicion for anaerobic involvement given involvement of enteric tiffany. Remains appropriate to  continue previously initiated cefepime and flagyl, while beginning linezolid for VRE coverage.    Beyond bacterial etiologies for patient's colitis, important to remember that CMV colitis can occur without viremia. When considering this, along with the fact that patient is high-risk for CMV infection due to CMV recipient negative serostatus and positive donor status, would recommend continuing treatment for possible CMV infection w/ ganciclovir. As described previously, would favor continuing dosing at induction therapy levels (HD dosing), given the potential adverse effects of selecting for resistant CMV with lower levels of prophylactic dosing of ganciclovir.    2. C diff positive diarrhea:  Commenced therapy with oral vancomycin on 8/13/18. Should continue at this time.     3.  Pseudomonas aeruginosa bacteremia associated with a hemodialysis catheter that was retained: History of positive blood cultures from 7/26 but by report, there have not been more recent positive cultures since then.  There is certainly a risk that he may have recrudescence of bacteremia as result of biofilm formation on the line that was retained.  However, at present, the cefepime would be suppressing growth as he has not been off antibiotics for any period of time.  He has not had evidence of breakthrough bacteremia as assessed by blood cultures from 8/10 as well as more recent blood cultures drawn during this admission.  Because of his intra-abdominal abscesses, we expect that he will continue to be on an agent active against Pseudomonas for some time.  Due to risk of colonization of the line, we would recommend surveillance blood cultures in the future with timing to be determined based on his final duration of antibiotic therapy but anticipated to be 1 and 2 weeks after cessation of any antibiotics active against Pseudomonas.     4. New small (3mm and 5mm) pulmonary nodules: Would repeat CT chest in 4-6 weeks.    5. Oral ulcer: Viral  testing during last admission was negative.  Wound culture grew Pseudomonas in addition to oral tiffany. Lesion appears to have decreased in size since earlier in hospitalization.     Other:  - QTc interval: 451ms 7/26/18  - Viral serostatus & prophylaxis: HSV1-, HSV2+, CMV D+/R-, EBV D?/R+, VZV+.  Currently on induction therapy levels of ganciclovir, as described above.  - PJP: [id number]  - Isolation status: Enteric for C difficile carriage.     Transplant ID will continue to follow with you.     Markell Tavarez MD  Medicine & Pediatrics, PGY-1  Pager: [phone]        Interval History:   Mr. Nicholson remains afebrile (T max 99.5 degrees) since admission. His peripheral WBC is increased at 6.0 today, on ongoing cefepime (renally dosed), metronidazole, and prophylactic oral vancomycin.  Culture of abdominal fluid obtained from 8/14/18 has demonstrated initial growth of citrobacter freundi complex, probable VRE, klebisella pneumoniae, and pseudomonas.    He is now POD #2 from his abdominal surgery, currently receiving dialysis. Continues to deny fevers, sweats, or chills. Says that his abdominal pain is improved from time of admission, though still present - has not used PCA much today, though notes that this is likely because he's been sleeping a lot. Notes minimal output from ostomy bag. Notes presence of tremor today, which he has noted in the past when his Tacrolimus levels have been high. Otherwise feeling well, and was able to walk earlier today. Denying CP, SOB, weakness/numbness, or new rashes.    Anti-infectives:  Current:  Cefepime 7/26-present  IV metronidazole 8/12-present  PO vancomycin 8/13-present  PO Bactrim for PJP prophylaxis 8/16-present    Transplants:  6/14/2018 (Heart), Postoperative day:  63.  Coordinator Britni Mcknight    Immunosuppression: Mycophenolate 500 mg twice daily, tacrolimus 1 mg every morning and 2 mg every afternoon prior trough goal had been 10-12 but currently targeting  "8.  Prednisone 15 mg twice daily         Current Medications & Allergies:       acetaminophen  1,000 mg Oral 4x Daily     ceFEPIme (MAXIPIME) IV  1 g Intravenous Q24H     fluticasone  1-2 spray Both Nostrils Daily     ganciclovir (CYTOVENE) intermittent infusion  1.25 mg/kg Intravenous Once per day on Mon Wed Fri     gelatin absorbable  1 each Topical During Hemodialysis (from stock)     insulin aspart  1-6 Units Subcutaneous Q4H     linezolid  600 mg Intravenous Q12H     lipids  250 mL Intravenous Q24H     metroNIDAZOLE  500 mg Intravenous Q8H     mycophenolate  500 mg Oral BID IS     nystatin  1,000,000 Units Swish & Swallow 4x Daily     pantoprazole  40 mg Intravenous Q24H     predniSONE  10 mg Oral BID     rosuvastatin  20 mg Oral Daily     saccharomyces boulardii  250 mg Oral BID     sodium chloride (PF)  3 mL Intracatheter During Hemodialysis (from stock)     sodium chloride (PF)  3 mL Intracatheter During Hemodialysis (from stock)     sodium chloride (PF)  3 mL Intracatheter Q8H     sodium chloride (PF)  3 mL Intracatheter Q8H     sulfamethoxazole-trimethoprim  1 tablet Oral Once per day on Tue Thu Sat     triamcinolone   Topical BID     vancomycin  125 mg Oral 4x Daily     Infusions/Drips:    bupivacaine liposome (EXPAREL) LONG ACTING injection was administered into the infiltration site to produce postsurgical analgesia. Duration of action is up to 72 hours, and other \"judith\" medications should not be given for 96 hours with the exception of the lidocaine 5% patch (LIDODERM) and the lidocaine 10mg in potassium infusions. This entry is for INFORMATION ONLY.       IV fluid REPLACEMENT ONLY       dextrose 10 mL/hr at 08/15/18 1347     lactated ringers 40 mL/hr at 08/14/18 2212     - MEDICATION INSTRUCTIONS -       Allergies   Allergen Reactions     Norco [Hydrocodone-Acetaminophen] Nausea and Vomiting     Cats      Throat tightness     Isosorbide Other (See Comments)     hypotension     Penicillins Hives     " Seasonal Allergies      rhinitis     Shrimp      Throat closes           Review of Systems:   As per Interval History.  Complete ROS is otherwise negative.         Physical Exam:   Vitals were reviewed and are stable.  Vital sign ranges over the past 24 hours:  Temp:  [97.9  F (36.6  C)-98.5  F (36.9  C)] 98.2  F (36.8  C)  Heart Rate:  [80-89] 89  Resp:  [16] 16  BP: (132-176)/() 152/101  Cuff Mean (mmHg):  [148] 148  SpO2:  [94 %-100 %] 100 %    Intake/Output Summary (Last 24 hours) at 08/16/18 1525  Last data filed at 08/16/18 1400   Gross per 24 hour   Intake                0 ml   Output             3000 ml   Net            -3000 ml       Physical Examination:  GENERAL: Pleasant and cooperative, resting in bed. No acute distress. Occasionally slow with responses, with some repetition in answers.   HEAD:  NCAT   EYES:  EOMI, anicteric sclerae  ENT:  No otorrhea.  Anterior oral lesion appears as white ulcerated area on roof of mouth. Margins are clean and dry. No exudate. Smaller in size from earlier in hospitalization.  LUNGS:  Clear to auscultation bilaterally  CARDIOVASCULAR:  Regular rate and rhythm with no murmur.  Well-healed surgical scars.    ABDOMEN:  Persistent absence of bowel-sounds in post-op period.  Diffuse abdominal tenderness with mild guarding.  New LLQ colostomy with scant output, clean stoma.  Clean / dry laporoscope incisions.  SKIN:  No acute rash.  NEUROLOGIC:  Alert, oriented x3, memory continues to be very slightly weak with slight analgesia confusion, but grossly cognitively intact.  Moves extremities x 4  T/L/D: Tunneled dialysis catheter at right chest lacks inflammation         Laboratory Data:   Creatinine 6.48 (on chronic hemodialysis)  BUN 42  K 5.4  Bicarb 24     CRP 76 -> 230  Procalcitonin 33.03 -> 29.21     WBC 6.0  ANC [place] of 0.3 on 7/29/18; ANC consistently in normal range since 8/1/18 and currently 5.1  ALC 0.3  Hemoglobin 9.9  Platelets 114     Tacro trough 25.3 on  , up from 8.4 on 18.  Also had supratherapeutic levels up to 24.7 on 18.     Microbiology  Abdm abscess fluid : Light growth of citrobacter freundii complex, probable VRE, Klebsiella pneumoniae, and pseudomonas aeruginosa      Blood                         18 dialysis catheter tip 2 strains CoNS                         7/26/18 x2 from [facility] Pseudomonas aeruginosa (Microbiology report obtained from [facility] and copy placed in paper chart.  Pan-sensitive).                         7/26/18 x2 [facility] negative                         7/28/18 x2 negative                         8/10/18 ×2 NGTD                         18 ×3 NGTD     Wound                         18: Mouth wound with pansensitive pseudomonas aeruginosa and normal tiffany                         18 Mouth ulcer     Peritoneal fluid                         18 13 yellow fluid with 4736 WBCs, 80% neutrophils.  Gram stain with no organisms and many WBCs.  Culture with Candida glabrata and Bacteroides caccae.                         1/15/18: Peritoneal fluid with 4256 WBCs, 91% neutrophils.  Gram stain with many WBCs and no organisms seen.  Culture with Staphylococcus epidermidis, Candida glabrata    Enteric  Enteric LOGAN   18 negative  Giardia ag   18 negative  C diff   18 positive  Cryptosporidium ag   18 pending  Microsporidium ag   18 pending     Virology  CMV blood PCR                         18 negative                         18 negative  EBV blood PCR                         18 negative  Parvovirus blood PCR                          18 negative  VZV blood PCR                         18 negative     Imagin/26/18: CT CAP:  1. Findings suggestive of mild infectious or inflammatory colitis of the ascending colon just proximal to the cecum. No pericolonic fluid collection.  2. Postoperative changes of cardiac transplant with no significant change in loculated  retrosternal fluid and soft tissue stranding compared to 6/26/2018.  3. 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        8/12/18 CT abd/pelv:  1 a. Inflamed segment of distal sigmoid colon in the setting of diverticulosis, most likely diverticulitis. This is complicated by a pericolonic abscess measuring up to 7.8 cm, which abuts multiple loops of small bowel.   1 b. Inflamed loop of proximal sigmoid colon, most compatible with uncomplicated diverticulitis.  1 c. Inflammation of the right colon compatible with colitis, typhlitis is a consideration setting of neutropenia.  1 e. Unchanged inflammation of the proximal duodenum, with a chronic appearance. Peptic ulcer disease is a consideration.  2. Small perirectal abscess, measuring up to 2.7 cm.  3. New right lower lobe pulmonary nodules measuring 5 and 3 mm, respectively. Recommend short-term follow-up CT chest in 3 months.  4. Stable size of multiple stable pancreatic IPMNs.

## 2018-08-16 NOTE — PROVIDER NOTIFICATION
MD paged: Capnography alarming frequently. Patient very irritated.  Discussed turning off PCA or putting patient on CPAP. Awaiting orders.

## 2018-08-17 ENCOUNTER — APPOINTMENT (OUTPATIENT)
Dept: PHYSICAL THERAPY | Facility: CLINIC | Age: 63
DRG: 329 | End: 2018-08-17
Attending: COLON & RECTAL SURGERY
Payer: MEDICARE

## 2018-08-17 LAB
ANION GAP SERPL CALCULATED.3IONS-SCNC: 10 MMOL/L (ref 3–14)
BACTERIA SPEC CULT: ABNORMAL
BASOPHILS # BLD AUTO: 0 10E9/L (ref 0–0.2)
BASOPHILS NFR BLD AUTO: 0 %
BUN SERPL-MCNC: 28 MG/DL (ref 7–30)
CALCIUM SERPL-MCNC: 8.2 MG/DL (ref 8.5–10.1)
CHLORIDE SERPL-SCNC: 100 MMOL/L (ref 94–109)
CO2 SERPL-SCNC: 24 MMOL/L (ref 20–32)
COPATH REPORT: NORMAL
CREAT SERPL-MCNC: 4.07 MG/DL (ref 0.66–1.25)
CRP SERPL-MCNC: 270 MG/L (ref 0–8)
DIFFERENTIAL METHOD BLD: ABNORMAL
EOSINOPHIL # BLD AUTO: 0 10E9/L (ref 0–0.7)
EOSINOPHIL NFR BLD AUTO: 0 %
ERYTHROCYTE [DISTWIDTH] IN BLOOD BY AUTOMATED COUNT: 19.8 % (ref 10–15)
GFR SERPL CREATININE-BSD FRML MDRD: 15 ML/MIN/1.7M2
GLUCOSE BLDC GLUCOMTR-MCNC: 123 MG/DL (ref 70–99)
GLUCOSE BLDC GLUCOMTR-MCNC: 138 MG/DL (ref 70–99)
GLUCOSE BLDC GLUCOMTR-MCNC: 142 MG/DL (ref 70–99)
GLUCOSE BLDC GLUCOMTR-MCNC: 160 MG/DL (ref 70–99)
GLUCOSE BLDC GLUCOMTR-MCNC: 170 MG/DL (ref 70–99)
GLUCOSE SERPL-MCNC: 156 MG/DL (ref 70–99)
HCT VFR BLD AUTO: 30.5 % (ref 40–53)
HGB BLD-MCNC: 9.8 G/DL (ref 13.3–17.7)
IMM GRANULOCYTES # BLD: 0.1 10E9/L (ref 0–0.4)
IMM GRANULOCYTES NFR BLD: 1.5 %
LYMPHOCYTES # BLD AUTO: 0.2 10E9/L (ref 0.8–5.3)
LYMPHOCYTES NFR BLD AUTO: 3.4 %
Lab: ABNORMAL
MAGNESIUM SERPL-MCNC: 1.9 MG/DL (ref 1.6–2.3)
MCH RBC QN AUTO: 28.6 PG (ref 26.5–33)
MCHC RBC AUTO-ENTMCNC: 32.1 G/DL (ref 31.5–36.5)
MCV RBC AUTO: 89 FL (ref 78–100)
MONOCYTES # BLD AUTO: 0.5 10E9/L (ref 0–1.3)
MONOCYTES NFR BLD AUTO: 6.9 %
NEUTROPHILS # BLD AUTO: 5.7 10E9/L (ref 1.6–8.3)
NEUTROPHILS NFR BLD AUTO: 88.2 %
NRBC # BLD AUTO: 0 10*3/UL
NRBC BLD AUTO-RTO: 0 /100
PHOSPHATE SERPL-MCNC: 3.9 MG/DL (ref 2.5–4.5)
PLATELET # BLD AUTO: 141 10E9/L (ref 150–450)
POTASSIUM SERPL-SCNC: 4.3 MMOL/L (ref 3.4–5.3)
RBC # BLD AUTO: 3.43 10E12/L (ref 4.4–5.9)
SODIUM SERPL-SCNC: 134 MMOL/L (ref 133–144)
SPECIMEN SOURCE: ABNORMAL
TACROLIMUS BLD-MCNC: 22 UG/L (ref 5–15)
TME LAST DOSE: ABNORMAL H
WBC # BLD AUTO: 6.5 10E9/L (ref 4–11)

## 2018-08-17 PROCEDURE — 86140 C-REACTIVE PROTEIN: CPT | Performed by: COLON & RECTAL SURGERY

## 2018-08-17 PROCEDURE — 80197 ASSAY OF TACROLIMUS: CPT | Performed by: INTERNAL MEDICINE

## 2018-08-17 PROCEDURE — 25000132 ZZH RX MED GY IP 250 OP 250 PS 637: Mod: GY | Performed by: INTERNAL MEDICINE

## 2018-08-17 PROCEDURE — 83735 ASSAY OF MAGNESIUM: CPT | Performed by: COLON & RECTAL SURGERY

## 2018-08-17 PROCEDURE — 40000275 ZZH STATISTIC RCP TIME EA 10 MIN

## 2018-08-17 PROCEDURE — 40000193 ZZH STATISTIC PT WARD VISIT

## 2018-08-17 PROCEDURE — 97530 THERAPEUTIC ACTIVITIES: CPT | Mod: GP

## 2018-08-17 PROCEDURE — 25000128 H RX IP 250 OP 636: Performed by: COLON & RECTAL SURGERY

## 2018-08-17 PROCEDURE — 21400006 ZZH R&B CCU INTERMEDIATE UMMC

## 2018-08-17 PROCEDURE — 85025 COMPLETE CBC W/AUTO DIFF WBC: CPT | Performed by: COLON & RECTAL SURGERY

## 2018-08-17 PROCEDURE — 25000128 H RX IP 250 OP 636: Performed by: STUDENT IN AN ORGANIZED HEALTH CARE EDUCATION/TRAINING PROGRAM

## 2018-08-17 PROCEDURE — 25000132 ZZH RX MED GY IP 250 OP 250 PS 637: Mod: GY | Performed by: COLON & RECTAL SURGERY

## 2018-08-17 PROCEDURE — A9270 NON-COVERED ITEM OR SERVICE: HCPCS | Mod: GY | Performed by: INTERNAL MEDICINE

## 2018-08-17 PROCEDURE — 97162 PT EVAL MOD COMPLEX 30 MIN: CPT | Mod: GP

## 2018-08-17 PROCEDURE — 25000132 ZZH RX MED GY IP 250 OP 250 PS 637: Mod: GY | Performed by: STUDENT IN AN ORGANIZED HEALTH CARE EDUCATION/TRAINING PROGRAM

## 2018-08-17 PROCEDURE — 80048 BASIC METABOLIC PNL TOTAL CA: CPT | Performed by: COLON & RECTAL SURGERY

## 2018-08-17 PROCEDURE — 36415 COLL VENOUS BLD VENIPUNCTURE: CPT | Performed by: COLON & RECTAL SURGERY

## 2018-08-17 PROCEDURE — 25000131 ZZH RX MED GY IP 250 OP 636 PS 637: Mod: GY | Performed by: INTERNAL MEDICINE

## 2018-08-17 PROCEDURE — A9270 NON-COVERED ITEM OR SERVICE: HCPCS | Mod: GY | Performed by: COLON & RECTAL SURGERY

## 2018-08-17 PROCEDURE — 00000146 ZZHCL STATISTIC GLUCOSE BY METER IP

## 2018-08-17 PROCEDURE — 25000125 ZZHC RX 250: Performed by: STUDENT IN AN ORGANIZED HEALTH CARE EDUCATION/TRAINING PROGRAM

## 2018-08-17 PROCEDURE — 25000125 ZZHC RX 250

## 2018-08-17 PROCEDURE — 25000132 ZZH RX MED GY IP 250 OP 250 PS 637: Mod: GY

## 2018-08-17 PROCEDURE — 40000903 ZZH STATISTIC WOC PT EDUCATION, 31-45 MIN

## 2018-08-17 PROCEDURE — 94642 AEROSOL INHALATION TREATMENT: CPT

## 2018-08-17 PROCEDURE — 84100 ASSAY OF PHOSPHORUS: CPT | Performed by: COLON & RECTAL SURGERY

## 2018-08-17 PROCEDURE — A9270 NON-COVERED ITEM OR SERVICE: HCPCS | Mod: GY

## 2018-08-17 PROCEDURE — 25000125 ZZHC RX 250: Performed by: COLON & RECTAL SURGERY

## 2018-08-17 PROCEDURE — 40000276 ZZH STATISTIC RCP TIME ED VENT EA 10 MIN

## 2018-08-17 RX ORDER — HYDRALAZINE HYDROCHLORIDE 50 MG/1
50 TABLET, FILM COATED ORAL 4 TIMES DAILY
Status: DISCONTINUED | OUTPATIENT
Start: 2018-08-17 | End: 2018-08-21

## 2018-08-17 RX ORDER — HYDRALAZINE HYDROCHLORIDE 20 MG/ML
10 INJECTION INTRAMUSCULAR; INTRAVENOUS ONCE
Status: COMPLETED | OUTPATIENT
Start: 2018-08-17 | End: 2018-08-17

## 2018-08-17 RX ORDER — ALBUTEROL SULFATE 0.83 MG/ML
SOLUTION RESPIRATORY (INHALATION)
Status: COMPLETED
Start: 2018-08-17 | End: 2018-08-17

## 2018-08-17 RX ADMIN — HYDRALAZINE HYDROCHLORIDE 50 MG: 50 TABLET ORAL at 12:20

## 2018-08-17 RX ADMIN — Medication 0.5 MG: at 22:24

## 2018-08-17 RX ADMIN — Medication 250 MG: at 19:48

## 2018-08-17 RX ADMIN — NYSTATIN 1000000 UNITS: 100000 SUSPENSION ORAL at 12:20

## 2018-08-17 RX ADMIN — PREDNISONE 10 MG: 10 TABLET ORAL at 19:48

## 2018-08-17 RX ADMIN — METRONIDAZOLE 500 MG: 500 INJECTION, SOLUTION INTRAVENOUS at 04:39

## 2018-08-17 RX ADMIN — MYCOPHENOLATE MOFETIL 500 MG: 250 CAPSULE ORAL at 07:48

## 2018-08-17 RX ADMIN — LINEZOLID 600 MG: 600 INJECTION, SOLUTION INTRAVENOUS at 15:11

## 2018-08-17 RX ADMIN — VANCOMYCIN HYDROCHLORIDE 125 MG: KIT at 12:20

## 2018-08-17 RX ADMIN — Medication 0.5 MG: at 20:03

## 2018-08-17 RX ADMIN — METRONIDAZOLE 500 MG: 500 INJECTION, SOLUTION INTRAVENOUS at 10:29

## 2018-08-17 RX ADMIN — Medication 250 MG: at 07:48

## 2018-08-17 RX ADMIN — INSULIN ASPART 1 UNITS: 100 INJECTION, SOLUTION INTRAVENOUS; SUBCUTANEOUS at 07:43

## 2018-08-17 RX ADMIN — HYDRALAZINE HYDROCHLORIDE 50 MG: 50 TABLET ORAL at 16:16

## 2018-08-17 RX ADMIN — PENTAMIDINE ISETHIONATE 300 MG: 300 INHALANT RESPIRATORY (INHALATION) at 08:25

## 2018-08-17 RX ADMIN — HEPARIN SODIUM 5000 UNITS: 5000 INJECTION, SOLUTION INTRAVENOUS; SUBCUTANEOUS at 16:16

## 2018-08-17 RX ADMIN — INSULIN ASPART 1 UNITS: 100 INJECTION, SOLUTION INTRAVENOUS; SUBCUTANEOUS at 18:22

## 2018-08-17 RX ADMIN — ACETAMINOPHEN 1000 MG: 500 TABLET, FILM COATED ORAL at 16:15

## 2018-08-17 RX ADMIN — INSULIN ASPART 1 UNITS: 100 INJECTION, SOLUTION INTRAVENOUS; SUBCUTANEOUS at 09:54

## 2018-08-17 RX ADMIN — VANCOMYCIN HYDROCHLORIDE 125 MG: KIT at 19:54

## 2018-08-17 RX ADMIN — NYSTATIN 1000000 UNITS: 100000 SUSPENSION ORAL at 07:48

## 2018-08-17 RX ADMIN — ACETAMINOPHEN 1000 MG: 500 TABLET, FILM COATED ORAL at 12:20

## 2018-08-17 RX ADMIN — SODIUM CHLORIDE, POTASSIUM CHLORIDE, SODIUM LACTATE AND CALCIUM CHLORIDE: 600; 310; 30; 20 INJECTION, SOLUTION INTRAVENOUS at 08:02

## 2018-08-17 RX ADMIN — ALBUTEROL SULFATE 2.5 MG: 2.5 SOLUTION RESPIRATORY (INHALATION) at 08:25

## 2018-08-17 RX ADMIN — HEPARIN SODIUM 5000 UNITS: 5000 INJECTION, SOLUTION INTRAVENOUS; SUBCUTANEOUS at 04:53

## 2018-08-17 RX ADMIN — VANCOMYCIN HYDROCHLORIDE 125 MG: KIT at 16:16

## 2018-08-17 RX ADMIN — VANCOMYCIN HYDROCHLORIDE 125 MG: KIT at 07:48

## 2018-08-17 RX ADMIN — LINEZOLID 600 MG: 600 INJECTION, SOLUTION INTRAVENOUS at 03:06

## 2018-08-17 RX ADMIN — HYDRALAZINE HYDROCHLORIDE 50 MG: 50 TABLET ORAL at 19:53

## 2018-08-17 RX ADMIN — HYDRALAZINE HYDROCHLORIDE 10 MG: 20 INJECTION INTRAMUSCULAR; INTRAVENOUS at 04:30

## 2018-08-17 RX ADMIN — Medication 0.5 MG: at 10:29

## 2018-08-17 RX ADMIN — PANTOPRAZOLE SODIUM 40 MG: 40 INJECTION, POWDER, FOR SOLUTION INTRAVENOUS at 07:48

## 2018-08-17 RX ADMIN — HYDRALAZINE HYDROCHLORIDE 50 MG: 50 TABLET ORAL at 09:46

## 2018-08-17 RX ADMIN — ROSUVASTATIN CALCIUM 20 MG: 20 TABLET, FILM COATED ORAL at 07:48

## 2018-08-17 RX ADMIN — CEFEPIME HYDROCHLORIDE 1 G: 1 INJECTION, POWDER, FOR SOLUTION INTRAMUSCULAR; INTRAVENOUS at 22:18

## 2018-08-17 RX ADMIN — Medication 0.5 MG: at 16:18

## 2018-08-17 RX ADMIN — MYCOPHENOLATE MOFETIL 500 MG: 250 CAPSULE ORAL at 18:24

## 2018-08-17 RX ADMIN — PREDNISONE 10 MG: 10 TABLET ORAL at 07:48

## 2018-08-17 RX ADMIN — NYSTATIN 1000000 UNITS: 100000 SUSPENSION ORAL at 16:16

## 2018-08-17 RX ADMIN — METRONIDAZOLE 500 MG: 500 INJECTION, SOLUTION INTRAVENOUS at 19:47

## 2018-08-17 RX ADMIN — Medication 0.5 MG: at 02:59

## 2018-08-17 RX ADMIN — GANCICLOVIR SODIUM 100 MG: 500 INJECTION, POWDER, LYOPHILIZED, FOR SOLUTION INTRAVENOUS at 18:24

## 2018-08-17 RX ADMIN — Medication 0.5 MG: at 12:29

## 2018-08-17 RX ADMIN — NYSTATIN 1000000 UNITS: 100000 SUSPENSION ORAL at 19:49

## 2018-08-17 RX ADMIN — ACETAMINOPHEN 1000 MG: 500 TABLET, FILM COATED ORAL at 19:48

## 2018-08-17 RX ADMIN — ACETAMINOPHEN 1000 MG: 500 TABLET, FILM COATED ORAL at 07:49

## 2018-08-17 ASSESSMENT — ACTIVITIES OF DAILY LIVING (ADL)
ADLS_ACUITY_SCORE: 13
ADLS_ACUITY_SCORE: 12
ADLS_ACUITY_SCORE: 13
ADLS_ACUITY_SCORE: 12
ADLS_ACUITY_SCORE: 13
ADLS_ACUITY_SCORE: 12

## 2018-08-17 ASSESSMENT — PAIN DESCRIPTION - DESCRIPTORS
DESCRIPTORS: DISCOMFORT
DESCRIPTORS: DISCOMFORT

## 2018-08-17 NOTE — PROGRESS NOTES
D: Patient with NICM s/p heart tx 6/14/18 now admitted 8/12/18 for daniella-colonic abscess, daniella-rectal abscess, diverticulitis, and R colon colitis s/p colectomy/colostomy placement (POD2).     I: Monitored vitals and assessed patient status. A0x4. VSS. NSR 80's-90's. One episode of sympomatic orthostatic hypotension with PT (see note). Afebrile. NPO except meds. No stool from colostomy. Very hypoactive bowel sounds. Up with 1 assist.      Running: D10 and Lactated Ringers  PRN: IV Hydromorphone q2hr     P: Continue to monitor patient status and report changes to treatment team. Plan for dialysis Saturday at 0730

## 2018-08-17 NOTE — PLAN OF CARE
"Problem: Surgery Nonspecified (Adult)  Goal: Signs and Symptoms of Listed Potential Problems Will be Absent, Minimized or Managed (Surgery Nonspecified)  Signs and symptoms of listed potential problems will be absent, minimized or managed by discharge/transition of care (reference Surgery Nonspecified (Adult) CPG).   Outcome: No Change  D/He continues on D10 and LR for fluids for now for fluids and \"to keep BS stable and give some sugar\"  p/possible TPN/IL in next few days  A/faint bowel sounds heard in all quadrants but no stool per ostomy. He continues on IV ATB. He is getting scheduled tylenol and prn IV Dilaudid for abdominal discomfort. He has Mepilex on buttocks. Hydralazine for BP.   P/monitor for chanages      "

## 2018-08-17 NOTE — PROGRESS NOTES
[facility]  Transplant Infectious Disease Progress Note     Patient:  Murray Nicholson, Date of birth 1955, Medical record number [id number]  Date of Visit:  08/17/2018         Assessment and Recommendations:   Recommendations:  -While NPO, continue IV linezolid, cefepime, and flagyl at current dosing for treatment of polymicrobial abdominal infection. Upon resumption of PO intake, may transition patient over to the following regimen:  -Ciprofloxacin 250mg PO q24hrs  -Linezolid 600mg PO BID  -Flagyl 500mg PO TID  - Continue secondary prophylaxis for C difficile with PO vancomycin. Should complete 10-day total course, which will conclude on 8/22/18 (started regimen on 8/13/18)  - Continue ganciclovir induction therapy dosing for ganciclovir 1.25mg/kg three times weekly (MWF) for possible CMV disease, with monitoring of CBC diff while on therapy. Should continue induction dosing until completion of dose on Monday 8/20/18, upon which patient can be switched to prophylactic dosing. Should continue prophylactic dosing until 3 months post transplant (9/14/18).  - Once patient is off cefepime therapy, we would recommend checking surveillance blood cultures 1 and 2 weeks after cessation of therapy to document clearance of the prior bacteremia.  - For PJP prophylaxis, recommend bactrim regimen 1 SS tab three times weekly (HD dosing) since leukopenia is resolved.  - For small pulmonary nodules, we would recommend sooner interval imaging in transplant patient with repeat CT in 4-6 weeks.  - Follow 8/14/18 bowel histopathology to look for evidence of CMV     Assessment:  Mr. Nicholson is a 63-year-old gentleman with history of ischemic/valvular cardiomyopathy s/p OHT on 6/14/18 (induction with solumedrol and maintenance with tacrolimus, MMF, and prednisone), ESRD on TThSa hemodialysis currently listed for kidney transplantation, history of polymicrobial peritonitis with Candida glabrata when previously  on peritoneal dialysis in 2018, recent Pseudomonas aeruginosa bacteremia on 18 with retained dialysis catheter, hypertension, hyperlipidemia, and type 2 diabetes who was admitted on  with subjective fevers, severe lower abdominal pain, and bloody stools.  Now C diff positive.  He is post-op day #3 s/p laparoscopic abscess drainage, sigmoidectomy, and partial small bowel resection, with fluid samples obtained in the OR now growing out citrobacter freundii complex, VRE, klebsiella pneumoniae, and pseudomonas aeruginosa.     ID issues include the followin. 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  Throughout this time period, he had been on broad-spectrum coverage with cefepime, which should have suppressed many enteric organisms, though with holes in anaerobic coverage, that could explain progression of symptoms. Taken to the OR on 18 for laparoscopic drainage of pericolonic and perirectal abscesses, as well as sigmoidectomy, end colostomy, and partial small bowel resection. Fluid cultures obtained in the OR (18) have since grown out citrobacter freundi complex, VRE, klebsiella pneumoniae, and pseudomonas aeruginosa. No anaerobic growth on cultures, though continue to have high suspicion for anaerobic involvement given involvement of enteric tiffany. Patient has remained afebrile in post-op period, with subjective improvement in abdominal pain. Remains appropriate to continue IV cefepime, linezolid and flagyl for treatment of polymicrobial infection while patient is NPO. However, could switch to PO options once patient is tolerating  oral intake.    Beyond bacterial etiologies for patient's colitis, important to remember that CMV colitis can occur without viremia. When considering this, along with the fact that patient is high-risk for CMV infection due to CMV recipient negative serostatus and positive donor status, would recommend continuing treatment for possible CMV infection w/ ganciclovir. As described previously, would favor continuing dosing at induction therapy levels (HD dosing) in the short term, given the potential adverse effects of selecting for resistant CMV with lower levels of prophylactic dosing. After completion of 7 day course of induction ganciclovir, would be appropriate to switch to prophylactic dosing for continued therapy. Should continue prophylactic dosing until 9/14/18, which represents 3-months post-transplant.    2. C diff positive diarrhea:  Commenced therapy with oral vancomycin on 8/13/18. Should continue at this for total 10-day course.     3.  Pseudomonas aeruginosa bacteremia associated with a hemodialysis catheter that was retained: History of positive blood cultures from 7/26 but by report, there have not been more recent positive cultures since then.  There is certainly a risk that he may have recrudescence of bacteremia as result of biofilm formation on the line that was retained.  However, at present, the cefepime would be suppressing growth as he has not been off antibiotics for any period of time.  He has not had evidence of breakthrough bacteremia as assessed by blood cultures from 8/10 as well as more recent blood cultures drawn during this admission.  Because of his intra-abdominal abscesses, we expect that he will continue to be on an agent active against Pseudomonas for some time.  Due to risk of colonization of the line, we would recommend surveillance blood cultures in the future with timing to be determined based on his final duration of antibiotic therapy but anticipated to be 1 and 2 weeks  after cessation of any antibiotics active against Pseudomonas.     4. New small (3mm and 5mm) pulmonary nodules: Would repeat CT chest in 4-6 weeks.    5. Oral ulcer: Viral testing during last admission was negative.  Wound culture grew Pseudomonas in addition to oral tiffany. Lesion appears to have decreased in size since earlier in hospitalization.     Other:  - QTc interval: 451ms 7/26/18  - Viral serostatus & prophylaxis: HSV1-, HSV2+, CMV D+/R-, EBV D?/R+, VZV+.  Currently on induction therapy levels of ganciclovir, as described above.  - PJP: [id number]  - Isolation status: Enteric for C difficile carriage.     Transplant ID will continue to follow with you.     Markell Tavarez MD  Medicine & Pediatrics, PGY-1  Pager: [phone]        Interval History:   Linezolid was started yesterday for VRE, with continuation of cefepime, flagyl, PO vancomycin, and ganciclovir. Patient has remained afebrile (T max 99.5 degrees) since admission, with another increase in WBC today (6.5, from 6.0 yesterday). He is now POD #3 from his abdominal surgery. Notes that his abdominal pain is overall improved from time of admission. However, is having a hard time finding adequate pain control, as the dilaudid he has been taking will lead to significant abdominal discomfort approximately 15-20 minutes after taking it. Continues to note absence of gas/liquid from his ostomy. Continues to deny fevers/sweats/chills, as well as CP, SOB, or new rashes.     Anti-infectives:  Current:  IV Cefepime 7/26-present  IV metronidazole 8/12-present  PO vancomycin 8/13-present  PO Bactrim for PJP prophylaxis 8/16-present  IV linezolid 8/16-present    Transplants:  6/14/2018 (Heart), Postoperative day:  64.  Coordinator Britni Mcknight    Immunosuppression: Mycophenolate 500 mg twice daily, tacrolimus 1 mg every morning and 2 mg every afternoon prior trough goal had been 10-12 but currently targeting 8.  Prednisone 15 mg twice daily          "Current Medications & Allergies:       acetaminophen  1,000 mg Oral 4x Daily     ceFEPIme (MAXIPIME) IV  1 g Intravenous Q24H     fluticasone  1-2 spray Both Nostrils Daily     ganciclovir (CYTOVENE) intermittent infusion  1.25 mg/kg Intravenous Once per day on Mon Wed Fri     heparin  5,000 Units Subcutaneous Q12H     hydrALAZINE  50 mg Oral 4x Daily     insulin aspart  1-6 Units Subcutaneous Q4H     linezolid  600 mg Intravenous Q12H     metroNIDAZOLE  500 mg Intravenous Q8H     mycophenolate  500 mg Oral BID IS     nystatin  1,000,000 Units Swish & Swallow 4x Daily     pantoprazole  40 mg Intravenous Q24H     predniSONE  10 mg Oral BID     rosuvastatin  20 mg Oral Daily     saccharomyces boulardii  250 mg Oral BID     sodium chloride (PF)  3 mL Intracatheter Q8H     sodium chloride (PF)  3 mL Intracatheter Q8H     triamcinolone   Topical BID     vancomycin  125 mg Oral 4x Daily     Infusions/Drips:    bupivacaine liposome (EXPAREL) LONG ACTING injection was administered into the infiltration site to produce postsurgical analgesia. Duration of action is up to 72 hours, and other \"judith\" medications should not be given for 96 hours with the exception of the lidocaine 5% patch (LIDODERM) and the lidocaine 10mg in potassium infusions. This entry is for INFORMATION ONLY.       IV fluid REPLACEMENT ONLY       dextrose 10 mL/hr at 08/17/18 0500     lactated ringers 40 mL/hr at 08/17/18 0802     - MEDICATION INSTRUCTIONS -       Allergies   Allergen Reactions     Norco [Hydrocodone-Acetaminophen] Nausea and Vomiting     Cats      Throat tightness     Isosorbide Other (See Comments)     hypotension     Penicillins Hives     Seasonal Allergies      rhinitis     Shrimp      Throat closes           Review of Systems:   As per Interval History.  Complete ROS is otherwise negative.         Physical Exam:   Vitals were reviewed and are stable.  Vital sign ranges over the past 24 hours:  Temp:  [97.7  F (36.5  C)-98.8  F (37.1 "  C)] 97.7  F (36.5  C)  Heart Rate:  [79-90] 87  Resp:  [16-18] 16  BP: (132-173)/() 142/104  Cuff Mean (mmHg):  [121] 121  SpO2:  [96 %-100 %] 98 %    Intake/Output Summary (Last 24 hours) at 08/17/18 1039  Last data filed at 08/17/18 0748   Gross per 24 hour   Intake          1034.34 ml   Output             3000 ml   Net         -1965.66 ml       Physical Examination:  GENERAL: Pleasant and cooperative, resting in bed. No acute distress. Continues to be somewhat slow with responses.  HEAD:  NCAT   EYES:  EOMI, anicteric sclerae  ENT:  No otorrhea.  Anterior oral lesion appears as white ulcerated area on roof of mouth. Margins are clean and dry. No exudate. Unchanged from yesterday.  LUNGS:  Clear to auscultation bilaterally  CARDIOVASCULAR:  Regular rate and rhythm with no murmur.  Well-healed surgical scars.    ABDOMEN:  Significantly hypoactive bowel sounds in post-op period.  Diffuse abdominal tenderness with mild guarding.  LLQ colostomy with clean stoma, though no output.  Clean / dry laporoscope incisions.  SKIN:  No acute rash.  NEUROLOGIC:  Alert, oriented x3, memory continues to be very slightly weak with slight confusion, but grossly cognitively intact.  Moves extremities x 4         Laboratory Data:   Creatinine 4.07 (on chronic hemodialysis, s/p dialysis yesterday)  BUN 28  K 4.3  Bicarb 24     CRP 76 -> 230 -> 270  Procalcitonin 33.03 -> 29.21     WBC 6.5  ANC [place] of 0.3 on 7/29/18; ANC consistently in normal range since 8/1/18 and currently 5.1  ALC 0.2  Hemoglobin 9.8  Platelets 141     Tacro trough 25.3 on 8/16, up from 8.4 on 8/12/18.  Also had supratherapeutic levels up to 24.7 on 7/28/18.     Microbiology  Abdm abscess fluid 8/14: Light growth of citrobacter freundii complex, VRE, Klebsiella pneumoniae, and pseudomonas aeruginosa      Blood                         6/14/18 dialysis catheter tip 2 strains CoNS                         7/26/18 x2 from [facility] Pseudomonas aeruginosa  (Microbiology report obtained from [employer] and copy placed in paper chart.  Pan-sensitive).                         7/26/18 x2 MC negative                         7/28/18 x2 negative                         8/10/18 ×2 NGTD                         18 ×3 NGTD     Wound                         18: Mouth wound with pansensitive pseudomonas aeruginosa and normal tiffany                         18 Mouth ulcer     Peritoneal fluid                         18 13 yellow fluid with 4736 WBCs, 80% neutrophils.  Gram stain with no organisms and many WBCs.  Culture with Candida glabrata and Bacteroides caccae.                         1/15/18: Peritoneal fluid with 4256 WBCs, 91% neutrophils.  Gram stain with many WBCs and no organisms seen.  Culture with Staphylococcus epidermidis, Candida glabrata    Enteric  Enteric LOGAN   18 negative  Giardia ag   18 negative  C diff   18 positive  Cryptosporidium ag   18 pending  Microsporidium ag   18 pending     Virology  CMV blood PCR                         18 negative                         18 negative  EBV blood PCR                         18 negative  Parvovirus blood PCR                          18 negative  VZV blood PCR                         18 negative     Imagin/26/18: CT CAP:  1. Findings suggestive of mild infectious or inflammatory colitis of the ascending colon just proximal to the cecum. No pericolonic fluid collection.  2. Postoperative changes of cardiac transplant with no significant change in loculated retrosternal fluid and soft tissue stranding compared to 2018.  3. 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        18 CT abd/pelv:  1 a. Inflamed segment of distal sigmoid colon in the setting of diverticulosis, most likely diverticulitis. This is complicated by a pericolonic abscess measuring up to 7.8 cm, which abuts multiple  loops of small bowel.   1 b. Inflamed loop of proximal sigmoid colon, most compatible with uncomplicated diverticulitis.  1 c. Inflammation of the right colon compatible with colitis, typhlitis is a consideration setting of neutropenia.  1 e. Unchanged inflammation of the proximal duodenum, with a chronic appearance. Peptic ulcer disease is a consideration.  2. Small perirectal abscess, measuring up to 2.7 cm.  3. New right lower lobe pulmonary nodules measuring 5 and 3 mm, respectively. Recommend short-term follow-up CT chest in 3 months.  4. Stable size of multiple stable pancreatic IPMNs.

## 2018-08-17 NOTE — PROGRESS NOTES
Colorectal Surgery Progress Note  POD#3    Subjective:  Started on linezolid yesterday given concern for VRE. Tac level elevated at 25.3.  Able to get some sleep overnight. Notes initially pain with receiving push dilaudid but resolves spontaneously. No gas per colostomy and continues to have more belching than normal.     Vitals:  Vitals:    08/17/18 0200 08/17/18 0300 08/17/18 0313 08/17/18 0316   BP:   (!) 162/105 (!) 173/110   BP Location:       Pulse:       Resp:    16   Temp:       TempSrc:       SpO2: 99% 100%     Weight:       Height:           I/O:  I/O last 3 completed shifts:  In: 220 [P.O.:20; I.V.:200]  Out: 3000 [Other:3000]    Physical Exam:  Gen: AAOx3, NAD  Pulm: Non-labored breathing on room air  Abd: Soft, non-distended, appropriately tender, no guarding   Incision C/D/I with dermabond at low midline incision, without erythema or drainage   Stoma pink and viable with no stool or gas in bag, minimal bowel sweat  Ext:  Warm and well-perfused, tremulous hands    BMP  Recent Labs  Lab 08/16/18  0638 08/15/18  0638 08/14/18  0620 08/13/18  0618  08/12/18  0834    134 133 135  < > 138   POTASSIUM 5.4* 4.4 4.5 3.8  < > 3.5   CHLORIDE 101 99 102 103  < > 101   CO2 24 25 20 24  < > 25   BUN 42* 30 48* 38*  < > 24   CR 6.48* 5.20* 7.08* 5.72*  < > 4.01*   GLC 98 79 106* 40*  < > 108*   MAG 2.0 1.9 2.5* 2.5*  < > 1.4*   PHOS 5.3* 5.0* 5.7*  --   --  2.6   < > = values in this interval not displayed.  CBC    Recent Labs  Lab 08/16/18  0638 08/15/18  0638 08/14/18  0620 08/13/18  0618   WBC 6.0 4.3 3.7* 5.2   HGB 9.9* 11.0* 9.0* 8.6*   HCT 30.4* 33.7* 27.7* 27.0*   * 134* 103* 117*     ASSESSMENT:  63 year old male h/o heart TXP 6/2018 on immunosuppressants, ESRD on HD, R IJ thrombus on apixaban (held, last dose 2d ago), T2DM, ongoing pseudomonal bacteremia p/w abd pain & BRBPR w/first episode of diverticulitis c/b paracolonic abscess seen on CT. CT reviewed, LLQ 8-cm paracolonic abscess. Appears  to be surrounded by SB, and may have a component of intramural abscess with stool in its interior. Sigmoid appears to be moderately inflamed, but there is some colon wall inflammation involving the ascending colon as well. Co-incidentally there is a right-sided anorectal abscess as well. His anorectal exam confirmed this. Most likely diverticulitis but may also be perforated stercoral ulcer, or complication of CMV colitis, C Diff colitis or ischemic colitis.      8/14 s/p lap sigmoidectomy w/end colostomy and small bowel resection, take down of small bowel to colon fistula   8/12 s/p EUA w/perirectal abscess I&D     - PRN IV Dilaudid. Consider switch to IV morphine if patient not tolerating  - Keep NPO except sips with meds (patient continues to be belching)  - Awaiting return of bowel function to advance diet, if has flatus please contact us to consider advancing diet  - Awaiting am hemoglobin, if stable, okay to start heparin gtt  - Please continue cefepime and flagyl for intra-abdominal contamination for at least 48 hours post-op, defer final duration to primary/ID  - WOCN teaching     Appreciate excellent cares by primary team. Colorectal will continue to follow. Please call with any questions or concerns.     Sp Dunlap MD   PGY-3, GEN SURG      Patient was seen with fellow who will discuss with staff.

## 2018-08-17 NOTE — PROGRESS NOTES
Cardiology 2 Progress Note           Assessment and Plan:   Murray Nicholson is a 63 year old male with a h/o NICM s/p heart txp (6/14/18), ESRD on HD, R internal jugular thrombus on apixaban and DM2 p/w 3-4 bright red BM and fevers/chills, found to have a daniella-colonic abscess (7.8 cm), daniella-rectal abscess (2.7 cm), probable diverticulitis, and R colon colitis.  Planned for daniella-rectal abscess drainage tonight, likely will require colectomy later this week pending discussion between colorectal and IR.    Changes today:  - POD#3 for laparoscopic assisted sigmoid colectomy with end colostomy  - Holding anticoagulation (except prophy)  - Continuing cefepime, flagyl, linezolid, PO vanc, ganciclovir   - Gave pentamidine this morning   - Holding tacro given elevated level  - Holding TPN today, will reconsider tomorrow if not passing gas yet, discussed with surgery    # Daniella-colonic abscess (7.8 cm) s/p laparoscopic assisted sigmoid colectomy with end colostomy 8/14) positive for VRE  # Daniella-rectal abscess (2.7 cm) s/p drainage 8/12  # R colon colitis  # Positive c. Diff    Addressing nutrition today; has not passed flatus/stool this AM.  Would plan to start TPN tomorrow if not able to advance diet.  Starting subcutaneous heparin today.  Did have provoked R internal jugular line-associated clot  On cefepime, flagyl, PO vanc, ganciclovir.  - Colorectal, transplant ID following, appreciate assistance    # S/p heart transplant 6/13/2018 (donor 3 vessel CAD  HSV1-, HSV1+, CMV D+/R-, EBV D?/R+, VZV+  tacro goal 8 due to infx, last heart bx 7/5/2018 was 0R  8/10/2018 bx results still pending)   - Lowered prednisone to 10/10 today given likely upcoming surgery  - Continue MMF, holding tacro, level still elevated @22 8/17  - Continue statin  - Due for pentamadine 8/17  - RHC biopsy from 8/10 pending    # Recent pseudomonas bacteremia  # Necrotic mouth ulcer, resolving  Had profound neutropenia and pseudomonas bacteremia several  weeks ago in associated with necrotic mouth ulcer; neutropenia likely immunosuppression induced.  Neutropenia resolved last admission and pseudomonal bacteremia is likely cleared.  Necrotic ulcer appears to be resolving from prior admission.  Not currently active issues but will continue to monitor.    # Pulmonary nodules  Incidental on CT, will need interval follow up in 4-6 weeks.    # Recent R internal jugular thrombus  - Holding apixaban in setting of recent surgery  - Discussed this with surgery today to consider starting anticoagulation post op: awaiting recs    # ESRD on TTS HD  - Nephrology consulted, appreciate assistance    # DM2 - SSI    FEN: NPO  PPx: Holding AC  Dispo: PT/OT to see when appropriate    Juan Mckay MD PGY-1  Internal Medicine Resident  [phone]    Patient discussed with Dr. Blum.         Subjective:   Overnight no acute events.  This AM attempting to get out of bed more frequently, notes he had trouble sleeping due to his monitoring beeping.          Medications:       acetaminophen  1,000 mg Oral 4x Daily     ceFEPIme (MAXIPIME) IV  1 g Intravenous Q24H     fluticasone  1-2 spray Both Nostrils Daily     ganciclovir (CYTOVENE) intermittent infusion  1.25 mg/kg Intravenous Once per day on Mon Wed Fri     heparin  5,000 Units Subcutaneous Q12H     insulin aspart  1-6 Units Subcutaneous Q4H     linezolid  600 mg Intravenous Q12H     metroNIDAZOLE  500 mg Intravenous Q8H     mycophenolate  500 mg Oral BID IS     nystatin  1,000,000 Units Swish & Swallow 4x Daily     pantoprazole  40 mg Intravenous Q24H     pentamidine  300 mg Inhalation Once     predniSONE  10 mg Oral BID     rosuvastatin  20 mg Oral Daily     saccharomyces boulardii  250 mg Oral BID     sodium chloride (PF)  3 mL Intracatheter Q8H     sodium chloride (PF)  3 mL Intracatheter Q8H     triamcinolone   Topical BID     vancomycin  125 mg Oral 4x Daily       bupivacaine liposome (EXPAREL) LONG ACTING injection was  "administered into the infiltration site to produce postsurgical analgesia. Duration of action is up to 72 hours, and other \"judith\" medications should not be given for 96 hours with the exception of the lidocaine 5% patch (LIDODERM) and the lidocaine 10mg in potassium infusions. This entry is for INFORMATION ONLY.       IV fluid REPLACEMENT ONLY       dextrose 10 mL/hr at 08/16/18 2300     lactated ringers 40 mL/hr at 08/17/18 0042     - MEDICATION INSTRUCTIONS -                 Objective:          Physical Exam:   Temp:  [97.9  F (36.6  C)-98.8  F (37.1  C)] 98.8  F (37.1  C)  Heart Rate:  [79-90] 86  Resp:  [16-18] 16  BP: (132-173)/() 173/110  SpO2:  [94 %-100 %] 100 %  I/O last 3 completed shifts:  In: 220 [P.O.:20; I.V.:200]  Out: 3000 [Other:3000]    Vitals:    08/12/18 0228 08/14/18 0500 08/16/18 0953   Weight: 75 kg (165 lb 5.5 oz) 78.8 kg (173 lb 11.6 oz) 79.1 kg (174 lb 6.1 oz)       GEN: Laying in bed, appears fatigued but not in acute distress  HEENT: Mouth ulcer unchanged from prior exams, no new redness/swelling  PULM: CTAB from anterior exam  CV: Regular rate and rhythm.  JVP not elevated  ABD: Colostomy site without surrounding redness.  Incision C/D/I.  NBS  EXT: Trace lower extremity edema         Data:   BMP    Recent Labs  Lab 08/16/18  0638 08/15/18  0638 08/14/18 0620 08/13/18 0618    134 133 135   POTASSIUM 5.4* 4.4 4.5 3.8   CHLORIDE 101 99 102 103   TRINI 7.8* 7.8* 7.5* 7.9*   CO2 24 25 20 24   BUN 42* 30 48* 38*   CR 6.48* 5.20* 7.08* 5.72*   GLC 98 79 106* 40*     LFTs    Recent Labs  Lab 08/14/18  0620 08/13/18  0618   ALKPHOS 110 99   AST 29 28   ALT 17 17   BILITOTAL 0.3 0.3   PROTTOTAL 5.3* 5.2*   ALBUMIN 1.6* 1.6*      CBC    Recent Labs  Lab 08/16/18  0638 08/15/18  0638 08/14/18  0620 08/13/18  0618   WBC 6.0 4.3 3.7* 5.2   RBC 3.37* 3.77* 3.11* 3.00*   HGB 9.9* 11.0* 9.0* 8.6*   HCT 30.4* 33.7* 27.7* 27.0*   MCV 90 89 89 90   MCH 29.4 29.2 28.9 28.7   MCHC 32.6 32.6 32.5 " 31.9   RDW 20.4* 20.3* 20.9* 21.0*   * 134* 103* 117*     INR    Recent Labs  Lab 08/12/18  0834 08/12/18  0230   INR 1.31* 1.25*     I agree with observations and finding above as I confirmed them

## 2018-08-17 NOTE — PLAN OF CARE
Problem: Patient Care Overview  Goal: Plan of Care/Patient Progress Review  Outcome: No Change  D: Pt who is POD#2 for laparoscopic assisted sigmoid colectomy with end colostomy. Hx of NICM s/p heart transplant in 6/18  I/A: Pt alert and oriented x4. Pt sinus rhythm with HRs in the 80s. Pt BP high this evening, pt asymptomatic. Provider paged, 1x dose of hydralazine given. Pt on RA with O2 sats >95%. Pt reports pain/discomfort in abdomen. IV dilaudid given 1x. Pt continues to be NPO. LR running continuously at 40 ml/hr. Cefipime and flagyl given as scheduled. Pt continues to have no flatus, bowel sounds hypoactive. Colostomy site pink/red, no stool.   P: Continue to monitor and assess pt with every encounter. Notify Cards 2 with any changes or questions.

## 2018-08-17 NOTE — PROGRESS NOTES
" 08/17/18 1025   Quick Adds   Type of Visit Initial PT Evaluation       Present no   Language English   Living Environment   Lives With alone   Living Arrangements apartment   Home Accessibility stairs to enter home;tub/shower is not walk in   Number of Stairs to Enter Home 24   Number of Stairs Within Home 0   Stair Railings at Home outside, present on right side   Transportation Available car   Self-Care   Usual Activity Tolerance good   Current Activity Tolerance poor   Regular Exercise yes   Activity/Exercise/Self-Care Comment Per OT note, pt was intermittently attending cardiac rehab however missed many appointments secondary to doctors appointments   Functional Level Prior   Ambulation 1-->assistive equipment   Transferring 0-->independent   Fall history within last six months yes   Number of times patient has fallen within last six months 1   Which of the above functional risks had a recent onset or change? ambulation;transferring   Prior Functional Level Comment Reports MOD I with functional mobility and ADLs prior to admission.   General Information   Onset of Illness/Injury or Date of Surgery - Date 08/12/18   Referring Physician Rick Tran MD   Patient/Family Goals Statement return home   Pertinent History of Current Problem (include personal factors and/or comorbidities that impact the POC) Murray Nicholson is a 63 year old male with a h/o NICM s/p heart txp, ESRD on HD, R internal jugular thrombus on apixaban and DM2 p/w 3-4 bright red BM and fevers/chills. Recently seen in transplant clinic two days ago for vitals check and med rec. Yesterday had HD in the morning. Pt reports the room temperature at HD is always cold and thus every time he goes, he is \"freezing\". Today, however, he was also having rigors. He also received another dose of antibiotics at HD. After he returned home, around 2pm, pt had a bright red bloody BM. There was blood on the toilet paper and blood " "clots in the toilet water. He subsequently had 2-3 more BM yesterday. Then later in the evening, pt developed \"crampy\" abd pain in a belt-like distribution in the lower abd, rated 8/10. He then took some pepto bismol and tylenol, which reduced his pain to 3/10. Given these constellation of sx, pt called his transplant coordinator, who recommended that pt present to ED for eval. Unable to ambulate down his stairs, pt called EMS to transport to [facility] ED. Of note, in his recorded diary, pt noted a sublingual Tmax of 100.7 on 8/9 and 100.3 on 8/11 at 19:00.    Precautions/Limitations abdominal precautions;sternal precautions   Weight-Bearing Status - LUE partial weight-bearing (% in comments)  (10#)   Weight-Bearing Status - RUE partial weight-bearing (% in comments)  (10#)   Weight-Bearing Status - LLE full weight-bearing   Weight-Bearing Status - RLE full weight-bearing   General Info Comments Activity: ambulate with assist   Cognitive Status Examination   Level of Consciousness alert   Follows Commands and Answers Questions 100% of the time   Personal Safety and Judgment intact   Pain Assessment   Patient Currently in Pain Yes, see Vital Sign flowsheet   Posture    Posture Forward head position;Protracted shoulders   Range of Motion (ROM)   ROM Comment B LE WNL/WFL   Strength   Strength Comments B LE 3/5 secondary to functional mobility   Bed Mobility   Bed Mobility Comments Min A for B LE    Transfer Skills   Transfer Comments CGA   Gait   Gait Comments Not Assessed   Sensory Examination   Sensory Perception no deficits were identified   Coordination   Coordination no deficits were identified   Muscle Tone   Muscle Tone no deficits were identified   General Therapy Interventions   Planned Therapy Interventions balance training;bed mobility training;gait training;strengthening;transfer training;progressive activity/exercise;home program guidelines   Clinical Impression   Criteria for Skilled Therapeutic Intervention " "yes, treatment indicated   PT Diagnosis Impaired functional mobility   Influenced by the following impairments Pain, Impaired LE strength, CV endurance, post-op precautions   Functional limitations due to impairments Impaired bed mobility, transfers, gait, stairs   Clinical Presentation Evolving/Changing   Clinical Presentation Rationale clinical judgement, current mobility, pt has 2+ body systems involved, lives alone, has stairs to enter home (24)   Clinical Decision Making (Complexity) Moderate complexity   Therapy Frequency` 5 times/week   Predicted Duration of Therapy Intervention (days/wks) 8/25/18   Anticipated Discharge Disposition Transitional Care Facility;Home   Risk & Benefits of therapy have been explained Yes   Patient, Family & other staff in agreement with plan of care Yes   [facility] [employer] TM \"6 Clicks\"   2016, Trustees of [facility], under license to [employer].  All rights reserved.   6 Clicks Short Forms Basic Mobility Inpatient Short Form   [facility] AM-PAC  \"6 Clicks\" V.2 Basic Mobility Inpatient Short Form   1. Turning from your back to your side while in a flat bed without using bedrails? 3 - A Little   2. Moving from lying on your back to sitting on the side of a flat bed without using bedrails? 3 - A Little   3. Moving to and from a bed to a chair (including a wheelchair)? 3 - A Little   4. Standing up from a chair using your arms (e.g., wheelchair, or bedside chair)? 3 - A Little   5. To walk in hospital room? 3 - A Little   6. Climbing 3-5 steps with a railing? 2 - A Lot   Basic Mobility Raw Score (Score out of 24.Lower scores equate to lower levels of function) 17   Total Evaluation Time   Total Evaluation Time (Minutes) 10     "

## 2018-08-17 NOTE — PLAN OF CARE
Problem: Patient Care Overview  Goal: Individualization & Mutuality  Outcome: No Change  D-pt is hypertensive (170/110),  x1 Hydralzine 10 mg IV ordered by Sabra Bryant MD  I-monitored, assessed and addressed pt's status and comfort for changes  A-B/P medications on hold leading HTN  P-Continue to monitor, assess and address pt's status and comfort for changes. Reassess pt's B/P after Hydralazine dose

## 2018-08-17 NOTE — PLAN OF CARE
Problem: Patient Care Overview  Goal: Plan of Care/Patient Progress Review  PT / 6C -     Discharge Planner PT   Patient plan for discharge: return home  Current status: PT evaluation completed.  Educated on abdominal precautions and implications for functional mobility.  Performed bed mobility with max HOB elevation + SBA.  Endorsing increased dizziness in sitting; BP 82/64. Pt instructed to return to supine.  Transferred partial sit>stand x 2 to scoot towards HOB and needing Min A for B LE to return to supine.  BP supine 102/79.  BP 1 minute later 111/93.  Reports all symptoms resolved once returned to supine.  Barriers to return to prior living situation: dizziness, level of assist, lives alone, 24 stairs to enter  Recommendations for discharge: TCU  Rationale for recommendations: Currently recommending TCU as continued skilled therapy required to improve LE strength and CV endurance to improve safety and independence prior to discharge home.  Pending LOS and continued progression with therapy, pt may progress to safe discharge home.  Will continued assess and update recommendation as able.        Entered by: Alma Rosa Rodarte 08/17/2018 11:44 AM

## 2018-08-17 NOTE — PROGRESS NOTES
"  WO Nurse Inpatient [facility]   WO Nurse Inpatient Adult     Follow Up Assessment   Assessment of new end Colostomy Stoma complication(s) none   Mucocutaneous junction; intact 8/16/18  Peristomal complication(s) none 8/16/18  Pouch wear time:24-48 hours+  Following today's visit:Patient /   is  able to demonstrate;       1. How to empty their pouch? no      2. How to change their pouch?  no         Objective data:  Patient history according to medical record:: 63 year old male- POD#3 (8/14/18) s/p laparoscopic assisted sigmoid colectomy with end colostomy- POD #5 s/p perirectal abscess I&D. Pt has h/o heart TXP 6/2018 on immunosuppressants, ESRD on HD, R IJ thrombus on apixabam (held, last dose 2d ago), T2DM, ongoing pseudomonal bacteremia p/w abd pain & BRBPR w/first episode of diverticulitis c/b paracolonic abscess seen on CTCurrent Diet/Nutrition:   Active Diet Order      NPO for Medical/Clinical Reasons Except for: Meds, Ice Chips     I/O last 3 completed shifts:  In: 1031.34 [P.O.:20; I.V.:1011.34]  Out: 3000 [Other:3000]  Labs:    Recent Labs  Lab 08/17/18  0644  08/14/18  0620  08/12/18  0834   ALBUMIN  --   --  1.6*  < >  --    HGB 9.8*  < > 9.0*  < > 7.4*   INR  --   --   --   --  1.31*   WBC 6.5  < > 3.7*  < > 4.9   .0*  --  230.0*  --   --    < > = values in this interval not displayed.     Physical Exam:  Current pouching system:Shashi 2 piece flat 57 mm placed yesterday remains intact  Reason for pouch change today: pouch not changed today  Stoma appearance: pink-red, oval and moist  Stoma size; 1 1/8\" x 1 5/8\" with adequate protrusion  Peristomal skin: not visualized (barrier in place)  Stoma output :serosanguinous bowel sweat, no flatus   Abdominal  Assessment  firm , NG still in place? No  Surgical Site: open to air  Pain: Sharp and Gnawing  Is patient still on a PCA No    Interventions:  Patient's chart evaluated.  Focus of today's visit: verbal instruction , odor/flatus " management and lifestyle adjustments, supplies, return demo of emptying pouch   Participant of teaching session today patient   Orders: Reviewed  Change made with ostomy management today: No  Patient/family: active participation  Supplies:at bedside    Plan:  Learning needs: pouch change return demonstration, diet and hydration  and discharge instructions  Preparation for discharge: No discharge preparations started  Recommend home care? yes    Discussed plan of care with Patient  Nursing to notify the Provider(s) and re-consult the WOC Nurse if new ostomy concerns or discharge planned before next planned WOC visit.    WOC Nurse will return: Monday  Face to face time: 35 minutes

## 2018-08-17 NOTE — PLAN OF CARE
Problem: Patient Care Overview  Goal: Individualization & Mutuality  Outcome: No Change  D-pt slept between cares during the night and had asked to be allowed to sleep.  Pt requested to have his labs drawn after 0600.  Pt's B/P was elevated during the night (see flowsheet), pt's HTN meds are on hold. Sabra Herrera MD was notified and ordered Hydralazine 10 mg iv x 1.  Colostomy intact. LR infusing @ 40 continuously during the night.   I-monitored, assessed and addressed pt's status and comfort for changes. Signed placed on pt's door to instruct lab staff to drawn morning labs after 0600.    A-HTN episode possibly due to medication adjustments.  P-Continue to monitor, assess and address pt's status and comfort for changes.

## 2018-08-18 ENCOUNTER — APPOINTMENT (OUTPATIENT)
Dept: GENERAL RADIOLOGY | Facility: CLINIC | Age: 63
DRG: 329 | End: 2018-08-18
Payer: MEDICARE

## 2018-08-18 ENCOUNTER — APPOINTMENT (OUTPATIENT)
Dept: CT IMAGING | Facility: CLINIC | Age: 63
DRG: 329 | End: 2018-08-18
Attending: STUDENT IN AN ORGANIZED HEALTH CARE EDUCATION/TRAINING PROGRAM
Payer: MEDICARE

## 2018-08-18 LAB
ANION GAP SERPL CALCULATED.3IONS-SCNC: 12 MMOL/L (ref 3–14)
BACTERIA SPEC CULT: ABNORMAL
BACTERIA SPEC CULT: ABNORMAL
BACTERIA SPEC CULT: NO GROWTH
BASOPHILS # BLD AUTO: 0 10E9/L (ref 0–0.2)
BASOPHILS NFR BLD AUTO: 0 %
BUN SERPL-MCNC: 48 MG/DL (ref 7–30)
CALCIUM SERPL-MCNC: 8 MG/DL (ref 8.5–10.1)
CHLORIDE SERPL-SCNC: 97 MMOL/L (ref 94–109)
CO2 SERPL-SCNC: 22 MMOL/L (ref 20–32)
CREAT SERPL-MCNC: 5.37 MG/DL (ref 0.66–1.25)
DIFFERENTIAL METHOD BLD: ABNORMAL
EOSINOPHIL # BLD AUTO: 0 10E9/L (ref 0–0.7)
EOSINOPHIL NFR BLD AUTO: 0.4 %
ERYTHROCYTE [DISTWIDTH] IN BLOOD BY AUTOMATED COUNT: 19.6 % (ref 10–15)
GASTROCULT GAST QL: POSITIVE
GFR SERPL CREATININE-BSD FRML MDRD: 11 ML/MIN/1.7M2
GLUCOSE BLDC GLUCOMTR-MCNC: 102 MG/DL (ref 70–99)
GLUCOSE BLDC GLUCOMTR-MCNC: 146 MG/DL (ref 70–99)
GLUCOSE BLDC GLUCOMTR-MCNC: 152 MG/DL (ref 70–99)
GLUCOSE BLDC GLUCOMTR-MCNC: 175 MG/DL (ref 70–99)
GLUCOSE BLDC GLUCOMTR-MCNC: 186 MG/DL (ref 70–99)
GLUCOSE SERPL-MCNC: 181 MG/DL (ref 70–99)
HCT VFR BLD AUTO: 29.9 % (ref 40–53)
HGB BLD-MCNC: 9.6 G/DL (ref 13.3–17.7)
IMM GRANULOCYTES # BLD: 0.1 10E9/L (ref 0–0.4)
IMM GRANULOCYTES NFR BLD: 1.8 %
LACTATE BLD-SCNC: 0.7 MMOL/L (ref 0.7–2)
LYMPHOCYTES # BLD AUTO: 0.2 10E9/L (ref 0.8–5.3)
LYMPHOCYTES NFR BLD AUTO: 4.4 %
Lab: ABNORMAL
Lab: NORMAL
MAGNESIUM SERPL-MCNC: 2 MG/DL (ref 1.6–2.3)
MCH RBC QN AUTO: 28.4 PG (ref 26.5–33)
MCHC RBC AUTO-ENTMCNC: 32.1 G/DL (ref 31.5–36.5)
MCV RBC AUTO: 89 FL (ref 78–100)
MONOCYTES # BLD AUTO: 0.4 10E9/L (ref 0–1.3)
MONOCYTES NFR BLD AUTO: 7.1 %
NEUTROPHILS # BLD AUTO: 4.7 10E9/L (ref 1.6–8.3)
NEUTROPHILS NFR BLD AUTO: 86.3 %
NRBC # BLD AUTO: 0 10*3/UL
NRBC BLD AUTO-RTO: 1 /100
PH GAST: ABNORMAL PH
PLATELET # BLD AUTO: 162 10E9/L (ref 150–450)
PLATELET # BLD EST: ABNORMAL 10*3/UL
POTASSIUM SERPL-SCNC: 4.6 MMOL/L (ref 3.4–5.3)
RADIOLOGIST FLAGS: NORMAL
RBC # BLD AUTO: 3.38 10E12/L (ref 4.4–5.9)
SODIUM SERPL-SCNC: 131 MMOL/L (ref 133–144)
SPECIMEN SOURCE: ABNORMAL
SPECIMEN SOURCE: NORMAL
WBC # BLD AUTO: 5.5 10E9/L (ref 4–11)

## 2018-08-18 PROCEDURE — 83735 ASSAY OF MAGNESIUM: CPT | Performed by: COLON & RECTAL SURGERY

## 2018-08-18 PROCEDURE — 25000132 ZZH RX MED GY IP 250 OP 250 PS 637: Mod: GY | Performed by: COLON & RECTAL SURGERY

## 2018-08-18 PROCEDURE — 25000125 ZZHC RX 250: Performed by: RADIOLOGY

## 2018-08-18 PROCEDURE — A9270 NON-COVERED ITEM OR SERVICE: HCPCS | Mod: GY | Performed by: STUDENT IN AN ORGANIZED HEALTH CARE EDUCATION/TRAINING PROGRAM

## 2018-08-18 PROCEDURE — 25000132 ZZH RX MED GY IP 250 OP 250 PS 637: Mod: GY | Performed by: STUDENT IN AN ORGANIZED HEALTH CARE EDUCATION/TRAINING PROGRAM

## 2018-08-18 PROCEDURE — 40000986 XR ABDOMEN PORT 1 VW

## 2018-08-18 PROCEDURE — 25000128 H RX IP 250 OP 636: Performed by: STUDENT IN AN ORGANIZED HEALTH CARE EDUCATION/TRAINING PROGRAM

## 2018-08-18 PROCEDURE — 25000125 ZZHC RX 250: Performed by: STUDENT IN AN ORGANIZED HEALTH CARE EDUCATION/TRAINING PROGRAM

## 2018-08-18 PROCEDURE — 25000125 ZZHC RX 250: Performed by: INTERNAL MEDICINE

## 2018-08-18 PROCEDURE — 36415 COLL VENOUS BLD VENIPUNCTURE: CPT | Performed by: COLON & RECTAL SURGERY

## 2018-08-18 PROCEDURE — 36415 COLL VENOUS BLD VENIPUNCTURE: CPT | Performed by: STUDENT IN AN ORGANIZED HEALTH CARE EDUCATION/TRAINING PROGRAM

## 2018-08-18 PROCEDURE — A9270 NON-COVERED ITEM OR SERVICE: HCPCS | Mod: GY | Performed by: COLON & RECTAL SURGERY

## 2018-08-18 PROCEDURE — 25000132 ZZH RX MED GY IP 250 OP 250 PS 637: Mod: GY | Performed by: INTERNAL MEDICINE

## 2018-08-18 PROCEDURE — 00000146 ZZHCL STATISTIC GLUCOSE BY METER IP

## 2018-08-18 PROCEDURE — 25000128 H RX IP 250 OP 636

## 2018-08-18 PROCEDURE — 74177 CT ABD & PELVIS W/CONTRAST: CPT

## 2018-08-18 PROCEDURE — 90937 HEMODIALYSIS REPEATED EVAL: CPT

## 2018-08-18 PROCEDURE — 25800025 ZZH RX 258: Performed by: STUDENT IN AN ORGANIZED HEALTH CARE EDUCATION/TRAINING PROGRAM

## 2018-08-18 PROCEDURE — A9270 NON-COVERED ITEM OR SERVICE: HCPCS | Mod: GY | Performed by: INTERNAL MEDICINE

## 2018-08-18 PROCEDURE — 82271 OCCULT BLOOD OTHER SOURCES: CPT | Performed by: STUDENT IN AN ORGANIZED HEALTH CARE EDUCATION/TRAINING PROGRAM

## 2018-08-18 PROCEDURE — 80048 BASIC METABOLIC PNL TOTAL CA: CPT | Performed by: COLON & RECTAL SURGERY

## 2018-08-18 PROCEDURE — 25000125 ZZHC RX 250: Performed by: COLON & RECTAL SURGERY

## 2018-08-18 PROCEDURE — A9270 NON-COVERED ITEM OR SERVICE: HCPCS | Mod: GY

## 2018-08-18 PROCEDURE — 85025 COMPLETE CBC W/AUTO DIFF WBC: CPT | Performed by: COLON & RECTAL SURGERY

## 2018-08-18 PROCEDURE — 99233 SBSQ HOSP IP/OBS HIGH 50: CPT | Mod: GC | Performed by: INTERNAL MEDICINE

## 2018-08-18 PROCEDURE — 40000141 ZZH STATISTIC PERIPHERAL IV START W/O US GUIDANCE

## 2018-08-18 PROCEDURE — 25000131 ZZH RX MED GY IP 250 OP 636 PS 637: Mod: GY | Performed by: INTERNAL MEDICINE

## 2018-08-18 PROCEDURE — 25000128 H RX IP 250 OP 636: Performed by: RADIOLOGY

## 2018-08-18 PROCEDURE — 63400005 ZZH RX 634

## 2018-08-18 PROCEDURE — 83605 ASSAY OF LACTIC ACID: CPT | Performed by: STUDENT IN AN ORGANIZED HEALTH CARE EDUCATION/TRAINING PROGRAM

## 2018-08-18 PROCEDURE — 25000132 ZZH RX MED GY IP 250 OP 250 PS 637: Mod: GY

## 2018-08-18 PROCEDURE — 21400006 ZZH R&B CCU INTERMEDIATE UMMC

## 2018-08-18 RX ORDER — METHYLPREDNISOLONE SODIUM SUCCINATE 40 MG/ML
8 INJECTION, POWDER, LYOPHILIZED, FOR SOLUTION INTRAMUSCULAR; INTRAVENOUS EVERY 12 HOURS
Status: DISCONTINUED | OUTPATIENT
Start: 2018-08-18 | End: 2018-08-23

## 2018-08-18 RX ORDER — HYDRALAZINE HYDROCHLORIDE 20 MG/ML
20 INJECTION INTRAMUSCULAR; INTRAVENOUS ONCE
Status: COMPLETED | OUTPATIENT
Start: 2018-08-18 | End: 2018-08-18

## 2018-08-18 RX ORDER — IOPAMIDOL 755 MG/ML
107 INJECTION, SOLUTION INTRAVASCULAR ONCE
Status: COMPLETED | OUTPATIENT
Start: 2018-08-18 | End: 2018-08-18

## 2018-08-18 RX ORDER — MORPHINE SULFATE 2 MG/ML
1-2 INJECTION, SOLUTION INTRAMUSCULAR; INTRAVENOUS EVERY 4 HOURS PRN
Status: DISCONTINUED | OUTPATIENT
Start: 2018-08-18 | End: 2018-08-20

## 2018-08-18 RX ADMIN — ROSUVASTATIN CALCIUM 20 MG: 20 TABLET, FILM COATED ORAL at 12:07

## 2018-08-18 RX ADMIN — HYDRALAZINE HYDROCHLORIDE 20 MG: 20 INJECTION INTRAMUSCULAR; INTRAVENOUS at 20:43

## 2018-08-18 RX ADMIN — PREDNISONE 10 MG: 10 TABLET ORAL at 12:07

## 2018-08-18 RX ADMIN — METRONIDAZOLE 500 MG: 500 INJECTION, SOLUTION INTRAVENOUS at 20:46

## 2018-08-18 RX ADMIN — LINEZOLID 600 MG: 600 INJECTION, SOLUTION INTRAVENOUS at 02:19

## 2018-08-18 RX ADMIN — EPOETIN ALFA 7600 UNITS: 10000 SOLUTION INTRAVENOUS; SUBCUTANEOUS at 11:06

## 2018-08-18 RX ADMIN — INSULIN ASPART 1 UNITS: 100 INJECTION, SOLUTION INTRAVENOUS; SUBCUTANEOUS at 12:35

## 2018-08-18 RX ADMIN — ACETAMINOPHEN 1000 MG: 500 TABLET, FILM COATED ORAL at 12:07

## 2018-08-18 RX ADMIN — SODIUM CHLORIDE, PRESERVATIVE FREE 77 ML: 5 INJECTION INTRAVENOUS at 18:14

## 2018-08-18 RX ADMIN — NYSTATIN 1000000 UNITS: 100000 SUSPENSION ORAL at 12:07

## 2018-08-18 RX ADMIN — LIDOCAINE HYDROCHLORIDE 30 ML: 20 SOLUTION ORAL; TOPICAL at 23:17

## 2018-08-18 RX ADMIN — METRONIDAZOLE 500 MG: 500 INJECTION, SOLUTION INTRAVENOUS at 04:20

## 2018-08-18 RX ADMIN — Medication 0.5 MG: at 05:25

## 2018-08-18 RX ADMIN — SODIUM CHLORIDE 300 ML: 9 INJECTION, SOLUTION INTRAVENOUS at 07:40

## 2018-08-18 RX ADMIN — ONDANSETRON 4 MG: 4 TABLET, ORALLY DISINTEGRATING ORAL at 05:44

## 2018-08-18 RX ADMIN — IOPAMIDOL 107 ML: 755 INJECTION, SOLUTION INTRAVENOUS at 18:13

## 2018-08-18 RX ADMIN — INSULIN ASPART 1 UNITS: 100 INJECTION, SOLUTION INTRAVENOUS; SUBCUTANEOUS at 22:35

## 2018-08-18 RX ADMIN — METHYLPREDNISOLONE SODIUM SUCCINATE 8 MG: 40 INJECTION, POWDER, LYOPHILIZED, FOR SOLUTION INTRAMUSCULAR; INTRAVENOUS at 20:43

## 2018-08-18 RX ADMIN — VANCOMYCIN HYDROCHLORIDE 125 MG: KIT at 12:06

## 2018-08-18 RX ADMIN — DEXTROSE MONOHYDRATE: 100 INJECTION, SOLUTION INTRAVENOUS at 02:45

## 2018-08-18 RX ADMIN — INSULIN ASPART 1 UNITS: 100 INJECTION, SOLUTION INTRAVENOUS; SUBCUTANEOUS at 02:22

## 2018-08-18 RX ADMIN — MYCOPHENOLATE MOFETIL 500 MG: 500 INJECTION, POWDER, LYOPHILIZED, FOR SOLUTION INTRAVENOUS at 21:56

## 2018-08-18 RX ADMIN — LINEZOLID 600 MG: 600 INJECTION, SOLUTION INTRAVENOUS at 14:09

## 2018-08-18 RX ADMIN — PANTOPRAZOLE SODIUM 40 MG: 40 INJECTION, POWDER, FOR SOLUTION INTRAVENOUS at 12:08

## 2018-08-18 RX ADMIN — HYDRALAZINE HYDROCHLORIDE 50 MG: 50 TABLET ORAL at 12:07

## 2018-08-18 RX ADMIN — HEPARIN SODIUM 5000 UNITS: 5000 INJECTION, SOLUTION INTRAVENOUS; SUBCUTANEOUS at 04:24

## 2018-08-18 RX ADMIN — SODIUM CHLORIDE 250 ML: 9 INJECTION, SOLUTION INTRAVENOUS at 07:41

## 2018-08-18 RX ADMIN — METRONIDAZOLE 500 MG: 500 INJECTION, SOLUTION INTRAVENOUS at 12:03

## 2018-08-18 RX ADMIN — Medication 250 MG: at 12:06

## 2018-08-18 RX ADMIN — MYCOPHENOLATE MOFETIL 500 MG: 250 CAPSULE ORAL at 12:06

## 2018-08-18 RX ADMIN — Medication: at 07:42

## 2018-08-18 RX ADMIN — CEFEPIME HYDROCHLORIDE 1 G: 1 INJECTION, POWDER, FOR SOLUTION INTRAMUSCULAR; INTRAVENOUS at 22:31

## 2018-08-18 ASSESSMENT — ACTIVITIES OF DAILY LIVING (ADL)
ADLS_ACUITY_SCORE: 12

## 2018-08-18 ASSESSMENT — PAIN DESCRIPTION - DESCRIPTORS
DESCRIPTORS: DISCOMFORT
DESCRIPTORS: DISCOMFORT

## 2018-08-18 NOTE — PLAN OF CARE
Problem: Patient Care Overview  Goal: Plan of Care/Patient Progress Review  Patient s/p colostomy 8/14. History of heart transplant (6/14/18), DM II and ESRD on HD. VSS, pain controlled with scheduled tylenol and hot packs. Patient refused dilaudid. HD run this AM, 600 ml removed and pt tolerated well. D10 and LR per orders. NPO except meds/sips of water. No activity noted from colostomy. Anticipate completion of abdominal xray and possible initiation of TPN. Continue to assess and monitor, notify Cards 2 with concerns.

## 2018-08-18 NOTE — PLAN OF CARE
Problem: Patient Care Overview  Goal: Plan of Care/Patient Progress Review  Outcome: No Change  D: Admitted 8/12 for C. Diff and bloody stools now s/p perirectal abscess I&D and laparoscopic sigmoid colectomy w/ colostomy (8/14).   I/A: AOx4. VSS on RA. Afebrile. Had persistent belching overnight with no flatus or colostomy output. Given prn IV dilaudid for abdominal pain x1 overnight and pt became nauseated and vomited. PRN zofran given and nausea was relieved. Pt states he has not been tolerating the current pain regimen well, but this was the first time he has vomited after dilaudid administration. Anuria, on HD; will dialyze today. D10 continues at 10 mL/hr and LR running at 40/hr. Meds given as scheduled. PIV x2. Right IJ line for dialysis site CDI. Able to stand with assist of 1.   P: Will continue to monitor and notify primary team with changes.

## 2018-08-18 NOTE — PROGRESS NOTES
HEMODIALYSIS TREATMENT NOTE    Date: 8/18/2018  Time: 11:46 AM    Data:  Pre Wt: 79.1 kg (174 lb 6.1 oz)   Desired Wt: 76.1 kg   Post Wt: 78.5 kg (173 lb 1 oz)  Weight gain: -0.6 kg   Weight change: 0.6 kg  Ultrafiltration - Post Run Net Total Removed (mL): 600 mL  Ultrafiltration - Post Run Net Total Gain (mL): 0 mL  Vascular Access Status: Yes, secured and visible  Dialyzer Rinse: Clear  Total Blood Volume Processed: 78.2  Total Dialysis (Treatment) Time:      Lab:   no    Interventions:  4 jours of HD with 600 mls pulled. VSS A+O hand off to RN    Assessment:  ESRD patient in for regular HD run      Plan:    [facility]

## 2018-08-18 NOTE — PROGRESS NOTES
Colorectal Surgery Progress Note  POD#4    Subjective:  No acute events overnight. Afebrile. Tolerating sips with meds. Patient had episode of emesis shortly after receiving IV Dilaudid. Endorses abdominal pain is acutely worse after receiving Dilaudid. Continues to have some belching. No flatus or stool output into bag.  Difficulty participating in PT due to uncontrolled pain.     Vitals:  Vitals:    08/17/18 1605 08/17/18 1954 08/17/18 2355 08/18/18 0439   BP: 119/90 (!) 146/104 (!) 129/97    BP Location: Left arm Left arm Left arm    Pulse:       Resp: 18 18 16    Temp: 98.3  F (36.8  C) 98.7  F (37.1  C) 98.6  F (37  C)    TempSrc: Axillary Oral Oral    SpO2: 98% 98% 100% 99%   Weight:       Height:           I/O:  I/O last 3 completed shifts:  In: 525.33 [P.O.:120; I.V.:405.33]  Out: 0     Physical Exam:  Gen: AAOx3, NAD  Pulm: Non-labored breathing on room air  Abd: Soft, non-distended, appropriately tender, no guarding   Incision C/D/I with dermabond at low midline incision, without erythema or drainage   Stoma pink and viable with no stool or gas in bag  Ext:  Warm and well-perfused    BMP  Recent Labs  Lab 08/18/18  0624 08/17/18  0644 08/16/18  0638 08/15/18  0638 08/14/18  0620   * 134 135 134 133   POTASSIUM 4.6 4.3 5.4* 4.4 4.5   CHLORIDE 97 100 101 99 102   CO2 22 24 24 25 20   BUN 48* 28 42* 30 48*   CR 5.37* 4.07* 6.48* 5.20* 7.08*   * 156* 98 79 106*   MAG 2.0 1.9 2.0 1.9 2.5*   PHOS  --  3.9 5.3* 5.0* 5.7*     CBC  Recent Labs  Lab 08/17/18  0644 08/16/18  0638 08/15/18  0638 08/14/18  0620   WBC 6.5 6.0 4.3 3.7*   HGB 9.8* 9.9* 11.0* 9.0*   HCT 30.5* 30.4* 33.7* 27.7*   * 114* 134* 103*     Path:  FINAL DIAGNOSIS:   A. SMALL BOWEL WITH FISTULA, RESECTION:   - Small bowel wall with fistulous tract lined by inflamed granulation   tissue   - Margins viable     B. SIGMOID COLON, SIGMOIDECTOMY:   - Colonic wall with diverticulosis and associated perforations lined by   acute and  chronic inflammation,   foreign body giant cell reaction and granulation tissue formation   - Margins viable     ASSESSMENT:   63 year old male h/o heart TXP 6/2018 on immunosuppressants, ESRD on HD, R IJ thrombus on apixaban (held, last dose 2d ago), T2DM, ongoing pseudomonal bacteremia p/w abd pain & BRBPR w/first episode of diverticulitis c/b paracolonic abscess seen on CT.  Co-incidentally there is a right-sided anorectal abscess as well. Path confirmed diverticulitis with associated perforations.      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 Consider switch to IV morphine if patient not tolerating   - Keep NPO except sips with meds (patient continues to be belching and episode of emesis)  - If continues to have emesis please contact Colorectal service  - Awaiting return of bowel function to advance diet, if has flatus please contact us to consider advancing diet  - okay to start heparin gtt without bolus, will defer start time to primary team, however please monitor hgb once at therapeautic levels of heparin and if no evidence of bleeding okay to resume home apixaban within 24 hours of heparin.  - 48 hours of post-op abx already completed for intra-abdominal contamination, defer final abx duration to primary/ID  - continue p.o. Vanc for Cdiff per primary/ID  - WOCN teaching      Appreciate excellent cares by primary team. Colorectal will continue to follow. Please call with any questions or concerns.     Jackie Harper,  General Surgery PGY-1  Colon and Rectal Surgery Service  [phone]    Patient was seen with fellow and discussed with staff.    Attestation:  This patient has been seen and evaluated by me.  Discussed with the house staff team or resident(s) and agree with the findings  and plan in this note.  Afebrile, wbc 6k. Vomited earlier--> looks like fairly thin dark bilious material, not clearly with blood.  Abd remains soft with diffuse mild tenderness.  Stoma pink, still not functioning.  Would leave on heparin and avoid apixaban until clinical situation is clear and he is clearly improving.     Russel Baron MD  Professor of Surgery  Chief, Division of Colon and Rectal Surgery  [phone]

## 2018-08-18 NOTE — PROGRESS NOTES
HEMODIALYSIS TREATMENT NOTE    Date: 8/18/2018  Time: 10:57 AM    Data:  Pre Wt: 79.1 kg (174 lb 6.1 oz)   Desired Wt: 79.1 kg   Post Wt: 76.1 kg (167 lb 12.3 oz)  Weight gain: -0 kg   Weight change: 0 kg  Ultrafiltration - Post Run Net Total Removed (mL): 0 mL  Ultrafiltration - Post Run Net Total Gain (mL): 0 mL  Vascular Access Status: Yes, secured and visible  Dialyzer Rinse: Streaked  Total Blood Volume Processed: 77.6  Total Dialysis (Treatment) Time:4hrs      Lab:   none    Interventions:Assessments  Pt dialyzed today for 4hrs on a K-2 Ca-3 bath via CVC. No UF per cards. VSS throughout. See Epic for further details. Report to primary RN.     Plan:    Per renal

## 2018-08-18 NOTE — PLAN OF CARE
Problem: Surgery Nonspecified (Adult)  Goal: Signs and Symptoms of Listed Potential Problems Will be Absent, Minimized or Managed (Surgery Nonspecified)  Signs and symptoms of listed potential problems will be absent, minimized or managed by discharge/transition of care (reference Surgery Nonspecified (Adult) CPG).   Brown black emesis  I/I was getting ready to give him meds. He told me to wait a minute, and then vomited into 2 blue bags, brown black liquid 1000 ml- meds not given of course, called MD as noted in GI note. Informed of above and MD is coming up to see him now.   A/vomit does not smell like BM or like blood. Indicates GI problem  P/monitor for changes  D/MD here and examined him and emesis, He said to cancel abdominal Xray and ordered CT scan and said only IV not po contrast and will do dialysis again tomorrow due to contrast. I told MD I would call when he returns from the test. MD already wrote for IV MMF for tonight. GI MD's here to see him and looked at emesis, would like lab to do guiac and they told me it looks more like bile. They are aware he is to go for CT scan. He is on a hover rob for when CT arrives   I/po meds are on hold for now. I asked on call MD an hour ago to change Hydralazine from po to IV. She told me she would order one dose for tonight only.  I/ 6c RN's rarely place NG tubes, and checked with charge RN. She said to call float RN-done.float RN is here to place tube and he refuses placement until numbing medication is given to numb his throat. Called MD to get the order for this after long explanation why pt requests this.   P/wait for med to arrive on unit, call float RN back later to try to place the tube.

## 2018-08-18 NOTE — PLAN OF CARE
Problem: Patient Care Overview  Goal: Plan of Care/Patient Progress Review  OT6C: Upon attempt in AM pt OOR, will check back as schedule allows

## 2018-08-18 NOTE — PROGRESS NOTES
Cardiology 2 Progress Note           Assessment and Plan:   Murray Nicholson is a 63 year old male with a h/o NICM s/p heart txp (6/14/18), ESRD on HD, R internal jugular thrombus on apixaban and DM2 p/w 3-4 bright red BM and fevers/chills, found to have a daniella-colonic abscess (7.8 cm), daniella-rectal abscess (2.7 cm), probable diverticulitis, and R colon colitis.  Planned for daniella-rectal abscess drainage tonight, likely will require colectomy later this week pending discussion between colorectal and IR.    Changes today:  - POD#4 for laparoscopic assisted sigmoid colectomy with end colostomy  - Still no flatus/ostom output, has abdominal pain  - Obtaining abdominal XR, will consider further imaging pending result  - Holding anticoagulation (except prophy)  - Continuing cefepime, flagyl, linezolid, PO vanc, ganciclovir  - Holding tacro given elevated level    # Daniella-colonic abscess (7.8 cm) s/p laparoscopic assisted sigmoid colectomy with end colostomy 8/14) positive for VRE  # Daniella-rectal abscess (2.7 cm) s/p drainage 8/12  # R colon colitis  # Positive c. Diff  Had abdominal pain/emesis overnight.  Endorses persistent abdominal pain today but states unchanged since his surgery.  Patient feels his abdominal pain worsens when he receives dilauded, will switch to morphine today.  Discussed with surgery who recommended IV tylenol - per pharmacy not able to obtain this in the hospital.  On subcutaneous heparin.  Did have provoked R internal jugular line-associated clot.  On cefepime, flagyl, PO vanc, ganciclovir.  - Colorectal, transplant ID following, appreciate assistance    # S/p heart transplant 6/13/2018 (donor 3 vessel CAD  HSV1-, HSV1+, CMV D+/R-, EBV D?/R+, VZV+  tacro goal 8 due to infx, last heart bx 7/5/2018 was 0R  8/10/2018 bx results still pending)   - Lowered prednisone to 10/10 this admit  - Continue MMF, holding tacro, level still elevated @22 8/17  - Continue statin  - Received pentamadine 8/17  - [facility]  biopsy from 8/10 pending    # Recent pseudomonas bacteremia  # Necrotic mouth ulcer, resolving  Had profound neutropenia and pseudomonas bacteremia several weeks ago in associated with necrotic mouth ulcer; neutropenia likely immunosuppression induced.  Neutropenia resolved last admission and pseudomonal bacteremia is likely cleared.  Necrotic ulcer appears to be resolving from prior admission.  Not currently active issues but will continue to monitor.    # Pulmonary nodules  Incidental on CT, will need interval follow up in 4-6 weeks.    # Recent R internal jugular thrombus  - Holding apixaban in setting of recent surgery    # ESRD on TTS HD  - Nephrology consulted, appreciate assistance    # DM2 - SSI    FEN: NPO  PPx: Holding AC  Dispo: PT/OT to see when appropriate    Bobby Bearden MD  Internal Medicine PGY-3  [phone]      Patient discussed with Dr. Austin.         Subjective:   Overnight had one episode of emesis, ongoing abdominal pain.  Denies change in quality of abdominal pain, reports it is worse and crampy whenever he receives dilauded.          Medications:       acetaminophen  1,000 mg Oral 4x Daily     ceFEPIme (MAXIPIME) IV  1 g Intravenous Q24H     fluticasone  1-2 spray Both Nostrils Daily     ganciclovir (CYTOVENE) intermittent infusion  1.25 mg/kg Intravenous Once per day on Mon Wed Fri     heparin  5,000 Units Subcutaneous Q12H     hydrALAZINE  50 mg Oral 4x Daily     insulin aspart  1-6 Units Subcutaneous Q4H     linezolid  600 mg Intravenous Q12H     metroNIDAZOLE  500 mg Intravenous Q8H     mycophenolate  500 mg Oral BID IS     nystatin  1,000,000 Units Swish & Swallow 4x Daily     pantoprazole  40 mg Intravenous Q24H     predniSONE  10 mg Oral BID     rosuvastatin  20 mg Oral Daily     saccharomyces boulardii  250 mg Oral BID     sodium chloride (PF)  3 mL Intracatheter Q8H     sodium chloride (PF)  3 mL Intracatheter Q8H     triamcinolone   Topical BID     vancomycin  125 mg Oral 4x  "Daily       bupivacaine liposome (EXPAREL) LONG ACTING injection was administered into the infiltration site to produce postsurgical analgesia. Duration of action is up to 72 hours, and other \"judith\" medications should not be given for 96 hours with the exception of the lidocaine 5% patch (LIDODERM) and the lidocaine 10mg in potassium infusions. This entry is for INFORMATION ONLY.       IV fluid REPLACEMENT ONLY       dextrose 10 mL/hr at 08/18/18 0245     lactated ringers 40 mL/hr at 08/18/18 0000     - MEDICATION INSTRUCTIONS -                 Objective:          Physical Exam:   Temp:  [97.8  F (36.6  C)-98.7  F (37.1  C)] 98.4  F (36.9  C)  Pulse:  [101] 101  Heart Rate:  [89-99] 98  Resp:  [16-18] 16  BP: ()/() 134/100  Cuff Mean (mmHg):  [110] 110  SpO2:  [98 %-100 %] 100 %  I/O last 3 completed shifts:  In: 1200 [P.O.:240; I.V.:960]  Out: 600 [Other:600]    Vitals:    08/12/18 0228 08/14/18 0500 08/16/18 0953 08/18/18 0730   Weight: 75 kg (165 lb 5.5 oz) 78.8 kg (173 lb 11.6 oz) 79.1 kg (174 lb 6.1 oz) 79.1 kg (174 lb 6.1 oz)       GEN: Laying in bed, appears fatigued but not in acute distress  HEENT: Mouth ulcer unchanged from prior exams, no new redness/swelling  PULM: CTAB from anterior exam  CV: Regular rate and rhythm.  JVP not elevated  ABD: Colostomy site without surrounding redness.  Incision C/D/I.  NBS.  Mild diffuse tenderness to palpation in all quadrants  EXT: Trace lower extremity edema         Data:   BMP    Recent Labs  Lab 08/18/18  0624 08/17/18  0644 08/16/18  0638 08/15/18  0638   * 134 135 134   POTASSIUM 4.6 4.3 5.4* 4.4   CHLORIDE 97 100 101 99   TRINI 8.0* 8.2* 7.8* 7.8*   CO2 22 24 24 25   BUN 48* 28 42* 30   CR 5.37* 4.07* 6.48* 5.20*   * 156* 98 79     LFTs    Recent Labs  Lab 08/14/18  0620 08/13/18  0618   ALKPHOS 110 99   AST 29 28   ALT 17 17   BILITOTAL 0.3 0.3   PROTTOTAL 5.3* 5.2*   ALBUMIN 1.6* 1.6*      CBC    Recent Labs  Lab 08/18/18  0624 " 08/17/18  0644 08/16/18  0638 08/15/18  0638   WBC 5.5 6.5 6.0 4.3   RBC 3.38* 3.43* 3.37* 3.77*   HGB 9.6* 9.8* 9.9* 11.0*   HCT 29.9* 30.5* 30.4* 33.7*   MCV 89 89 90 89   MCH 28.4 28.6 29.4 29.2   MCHC 32.1 32.1 32.6 32.6   RDW 19.6* 19.8* 20.4* 20.3*    141* 114* 134*     INR    Recent Labs  Lab 08/12/18  0834 08/12/18  0230   INR 1.31* 1.25*       Faculty Attestation  Rhett Austin M.D.    I personally saw and examined this patient, reviewed recent laboratories and imaging studies, discussed the case with the housestaff, and conveyed plan to the patient.  I answered all questions from patient and/or family. I agree with the examination, assessment and plan outlined here.      General: ill appearing  Chest: bibasilar dullness  Cor: regular rhythm no gallop  Abd: distended, tympanic, mildly tender.  Extr: edema    Continue NPO  Flat plate abdomen and/or CT    Tacrolimus level is 22 probably erratic absorption and is on hold

## 2018-08-18 NOTE — PROGRESS NOTES
Nephrology Progress Note  08/18/2018       Murray Nicholson is a 63 year old male with Heart transplant 6/18, ESRD on HD, HTN, Right internal jugular thrombus on Aphixaban, recent admission 7/26-8/6/18  for pseudomonas bacteremia felt to be 2/2 probable prececal colitis and necrotic mouth ulcer, admitted 8/12/18 with abdominal pain, bloody stool and fever, found to have C diff infection, anorectal abscess and pericolonic abscess. Last HD 8/11/18.         ASSESSMENT AND RECOMMENDATIONS:       1. ESRD - Etiology of ESRD 2/2 HTN, DM, CRS.   Has chronic OP HD at [facility], TTS schedule, under care of Dr Mcpherson.    - Dialysis orders: Right TDC, EDW 76 kg, Run time 4 hrs   - Will continue TTS schedule      2. Volume status - was not able to weigh today. EDW 76.0 kg. No edema/dyspnea, but remains on supplemental oxygen. -170/. Is anuric.      - had to hold off on UF due to low BP    - Continue pre run weights, standing   - Strict I/O      3. HTN - running softer today, held off on UF and ran him cooler       4. Transplant IS regimen: Tac, MMF, Pred. TAC 8.4      5. Electrolytes - Pre run K 5.4, Na 135      6. Acid base - No acute concerns. Bicarb 25      7. BMD - corrected Ca 9.6, Phos 5.3, albumin 1.6   - Vit D/PTH being managed in OP HD unit      8. Anemia - Hgb 9.9 post op. On Epo 7600 q run. Last RBC 8/12      9. Pericolonic abscess, small perircal abscess 2.7 cm.- Underwent I/D anorectal abscess on 8/13 and  Sigmoidectomy/end colostomy on 8/14. Also has C diff. Antimicrobials per primary       10. Lung nodules - New Right lower lobe pulmonary nodules seen on CT this admission. ID recommending close f/u.       Recommendations were communicated to primary team via progress note          Interval History :     Seen on HD    Tolerating HD w/o complication  Has some pain and very scant ostomy output       Review of Systems:   I reviewed the following systems:  GI: NPO  Neuro:  no confusion  Constitutional:  " no fever or chills  CV: denies dyspnea, CP or edema.   : Minimal urine    Physical Exam:   I/O last 3 completed shifts:  In: 525.33 [P.O.:120; I.V.:405.33]  Out: 0    BP 98/82  Pulse 70  Temp 97.8  F (36.6  C) (Oral)  Resp 18  Ht 1.753 m (5' 9\")  Wt 79.1 kg (174 lb 6.1 oz)  SpO2 100%  BMI 25.75 kg/m2     GENERAL APPEARANCE: Comfortable on HD  EYES: no scleral icterus, pupils equal  Pulmonary: lungs CTA. Breathing is non labored. On supplemental oxygen  CV: RRR   - Edema - none  GI: soft, appropriately tender. Colostomy/stoma pink  MS: no evidence of inflammation in joints, no muscle tenderness  SKIN: no rash, warm, dry, no cyanosis  NEURO: alert/oriented, has bilateral hand tremors  ACCESS: Right TDC    Labs:   All labs reviewed by me  Electrolytes/Renal -   Recent Labs   Lab Test  08/18/18   0624  08/17/18   0644  08/16/18   0638  08/15/18   0638   NA  131*  134  135  134   POTASSIUM  4.6  4.3  5.4*  4.4   CHLORIDE  97  100  101  99   CO2  22  24  24  25   BUN  48*  28  42*  30   CR  5.37*  4.07*  6.48*  5.20*   GLC  181*  156*  98  79   TRINI  8.0*  8.2*  7.8*  7.8*   MAG  2.0  1.9  2.0  1.9   PHOS   --   3.9  5.3*  5.0*       CBC -   Recent Labs   Lab Test  08/18/18   0624  08/17/18   0644  08/16/18   0638   WBC  5.5  6.5  6.0   HGB  9.6*  9.8*  9.9*   PLT  162  141*  114*       LFTs -   Recent Labs   Lab Test  08/14/18   0620  08/13/18   0618  08/08/18   0921  07/29/18   0523   ALKPHOS  110  99  156*  106   BILITOTAL  0.3  0.3  0.4  0.4   ALT  17  17  22  21   AST  29  28  34  26   PROTTOTAL  5.3*  5.2*   --   5.2*   ALBUMIN  1.6*  1.6*   --   1.9*       Iron Panel -   Recent Labs   Lab Test  07/10/18   0711  05/08/18   0954  07/19/17   1306   IRON  66  58  46   IRONSAT  28  27  18   ALBERTINA  771*  621*  369       Current Medications:    acetaminophen  1,000 mg Oral 4x Daily     ceFEPIme (MAXIPIME) IV  1 g Intravenous Q24H     epoetin emily (EPOGEN,PROCRIT) inj ESRD  7,600 Units Intravenous Once in dialysis " "    fluticasone  1-2 spray Both Nostrils Daily     ganciclovir (CYTOVENE) intermittent infusion  1.25 mg/kg Intravenous Once per day on Mon Wed Fri     gelatin absorbable  1 each Topical During Hemodialysis (from stock)     heparin  5,000 Units Subcutaneous Q12H     hydrALAZINE  50 mg Oral 4x Daily     insulin aspart  1-6 Units Subcutaneous Q4H     linezolid  600 mg Intravenous Q12H     metroNIDAZOLE  500 mg Intravenous Q8H     mycophenolate  500 mg Oral BID IS     nystatin  1,000,000 Units Swish & Swallow 4x Daily     pantoprazole  40 mg Intravenous Q24H     predniSONE  10 mg Oral BID     rosuvastatin  20 mg Oral Daily     saccharomyces boulardii  250 mg Oral BID     sodium chloride (PF)  3 mL Intracatheter During Hemodialysis (from stock)     sodium chloride (PF)  3 mL Intracatheter During Hemodialysis (from stock)     sodium chloride (PF)  3 mL Intracatheter Q8H     sodium chloride (PF)  3 mL Intracatheter Q8H     triamcinolone   Topical BID     vancomycin  125 mg Oral 4x Daily       bupivacaine liposome (EXPAREL) LONG ACTING injection was administered into the infiltration site to produce postsurgical analgesia. Duration of action is up to 72 hours, and other \"judith\" medications should not be given for 96 hours with the exception of the lidocaine 5% patch (LIDODERM) and the lidocaine 10mg in potassium infusions. This entry is for INFORMATION ONLY.       IV fluid REPLACEMENT ONLY       dextrose 10 mL/hr at 08/18/18 0245     lactated ringers 40 mL/hr at 08/18/18 0000     - MEDICATION INSTRUCTIONS -       Gilberto Ulloa MD  "

## 2018-08-19 LAB
ANION GAP SERPL CALCULATED.3IONS-SCNC: 10 MMOL/L (ref 3–14)
BASOPHILS # BLD AUTO: 0 10E9/L (ref 0–0.2)
BASOPHILS NFR BLD AUTO: 0 %
BUN SERPL-MCNC: 34 MG/DL (ref 7–30)
CALCIUM SERPL-MCNC: 7.9 MG/DL (ref 8.5–10.1)
CHLORIDE SERPL-SCNC: 97 MMOL/L (ref 94–109)
CO2 SERPL-SCNC: 25 MMOL/L (ref 20–32)
CREAT SERPL-MCNC: 3.65 MG/DL (ref 0.66–1.25)
DIFFERENTIAL METHOD BLD: ABNORMAL
EOSINOPHIL # BLD AUTO: 0 10E9/L (ref 0–0.7)
EOSINOPHIL NFR BLD AUTO: 0 %
ERYTHROCYTE [DISTWIDTH] IN BLOOD BY AUTOMATED COUNT: 19.1 % (ref 10–15)
ERYTHROCYTE [DISTWIDTH] IN BLOOD BY AUTOMATED COUNT: 19.5 % (ref 10–15)
GFR SERPL CREATININE-BSD FRML MDRD: 17 ML/MIN/1.7M2
GLUCOSE BLDC GLUCOMTR-MCNC: 160 MG/DL (ref 70–99)
GLUCOSE BLDC GLUCOMTR-MCNC: 176 MG/DL (ref 70–99)
GLUCOSE BLDC GLUCOMTR-MCNC: 181 MG/DL (ref 70–99)
GLUCOSE BLDC GLUCOMTR-MCNC: 181 MG/DL (ref 70–99)
GLUCOSE BLDC GLUCOMTR-MCNC: 188 MG/DL (ref 70–99)
GLUCOSE BLDC GLUCOMTR-MCNC: 203 MG/DL (ref 70–99)
GLUCOSE BLDC GLUCOMTR-MCNC: 207 MG/DL (ref 70–99)
GLUCOSE SERPL-MCNC: 207 MG/DL (ref 70–99)
HCT VFR BLD AUTO: 27.1 % (ref 40–53)
HCT VFR BLD AUTO: 28.3 % (ref 40–53)
HGB BLD-MCNC: 8.4 G/DL (ref 13.3–17.7)
HGB BLD-MCNC: 8.9 G/DL (ref 13.3–17.7)
HGB BLD-MCNC: 8.9 G/DL (ref 13.3–17.7)
IMM GRANULOCYTES # BLD: 0.1 10E9/L (ref 0–0.4)
IMM GRANULOCYTES NFR BLD: 1.4 %
LMWH PPP CHRO-ACNC: 0.64 IU/ML
LYMPHOCYTES # BLD AUTO: 0.4 10E9/L (ref 0.8–5.3)
LYMPHOCYTES NFR BLD AUTO: 7 %
MAGNESIUM SERPL-MCNC: 1.9 MG/DL (ref 1.6–2.3)
MCH RBC QN AUTO: 28.4 PG (ref 26.5–33)
MCH RBC QN AUTO: 29.2 PG (ref 26.5–33)
MCHC RBC AUTO-ENTMCNC: 31.4 G/DL (ref 31.5–36.5)
MCHC RBC AUTO-ENTMCNC: 32.8 G/DL (ref 31.5–36.5)
MCV RBC AUTO: 89 FL (ref 78–100)
MCV RBC AUTO: 90 FL (ref 78–100)
MONOCYTES # BLD AUTO: 0.3 10E9/L (ref 0–1.3)
MONOCYTES NFR BLD AUTO: 5 %
NEUTROPHILS # BLD AUTO: 4.5 10E9/L (ref 1.6–8.3)
NEUTROPHILS NFR BLD AUTO: 86.6 %
NRBC # BLD AUTO: 0 10*3/UL
NRBC BLD AUTO-RTO: 1 /100
PHOSPHATE SERPL-MCNC: 3.1 MG/DL (ref 2.5–4.5)
PLATELET # BLD AUTO: 132 10E9/L (ref 150–450)
PLATELET # BLD AUTO: 145 10E9/L (ref 150–450)
POTASSIUM SERPL-SCNC: 4.1 MMOL/L (ref 3.4–5.3)
RBC # BLD AUTO: 3.05 10E12/L (ref 4.4–5.9)
RBC # BLD AUTO: 3.13 10E12/L (ref 4.4–5.9)
SODIUM SERPL-SCNC: 132 MMOL/L (ref 133–144)
TACROLIMUS BLD-MCNC: 10.7 UG/L (ref 5–15)
TME LAST DOSE: NORMAL H
WBC # BLD AUTO: 5.2 10E9/L (ref 4–11)
WBC # BLD AUTO: 6 10E9/L (ref 4–11)

## 2018-08-19 PROCEDURE — 25000125 ZZHC RX 250: Performed by: INTERNAL MEDICINE

## 2018-08-19 PROCEDURE — 25000128 H RX IP 250 OP 636

## 2018-08-19 PROCEDURE — 40000141 ZZH STATISTIC PERIPHERAL IV START W/O US GUIDANCE

## 2018-08-19 PROCEDURE — 25000132 ZZH RX MED GY IP 250 OP 250 PS 637: Mod: GY

## 2018-08-19 PROCEDURE — 00000146 ZZHCL STATISTIC GLUCOSE BY METER IP

## 2018-08-19 PROCEDURE — 25000125 ZZHC RX 250: Performed by: STUDENT IN AN ORGANIZED HEALTH CARE EDUCATION/TRAINING PROGRAM

## 2018-08-19 PROCEDURE — 85520 HEPARIN ASSAY: CPT | Performed by: INTERNAL MEDICINE

## 2018-08-19 PROCEDURE — 25000132 ZZH RX MED GY IP 250 OP 250 PS 637: Mod: GY | Performed by: STUDENT IN AN ORGANIZED HEALTH CARE EDUCATION/TRAINING PROGRAM

## 2018-08-19 PROCEDURE — 80048 BASIC METABOLIC PNL TOTAL CA: CPT | Performed by: COLON & RECTAL SURGERY

## 2018-08-19 PROCEDURE — 36415 COLL VENOUS BLD VENIPUNCTURE: CPT | Performed by: COLON & RECTAL SURGERY

## 2018-08-19 PROCEDURE — 99233 SBSQ HOSP IP/OBS HIGH 50: CPT | Mod: GC | Performed by: INTERNAL MEDICINE

## 2018-08-19 PROCEDURE — A9270 NON-COVERED ITEM OR SERVICE: HCPCS | Mod: GY | Performed by: INTERNAL MEDICINE

## 2018-08-19 PROCEDURE — 85025 COMPLETE CBC W/AUTO DIFF WBC: CPT | Performed by: COLON & RECTAL SURGERY

## 2018-08-19 PROCEDURE — 25000132 ZZH RX MED GY IP 250 OP 250 PS 637: Mod: GY | Performed by: COLON & RECTAL SURGERY

## 2018-08-19 PROCEDURE — 25000128 H RX IP 250 OP 636: Performed by: STUDENT IN AN ORGANIZED HEALTH CARE EDUCATION/TRAINING PROGRAM

## 2018-08-19 PROCEDURE — A9270 NON-COVERED ITEM OR SERVICE: HCPCS | Mod: GY

## 2018-08-19 PROCEDURE — 85027 COMPLETE CBC AUTOMATED: CPT

## 2018-08-19 PROCEDURE — 36415 COLL VENOUS BLD VENIPUNCTURE: CPT | Performed by: INTERNAL MEDICINE

## 2018-08-19 PROCEDURE — 25000125 ZZHC RX 250: Performed by: COLON & RECTAL SURGERY

## 2018-08-19 PROCEDURE — 80197 ASSAY OF TACROLIMUS: CPT | Performed by: INTERNAL MEDICINE

## 2018-08-19 PROCEDURE — 84100 ASSAY OF PHOSPHORUS: CPT | Performed by: COLON & RECTAL SURGERY

## 2018-08-19 PROCEDURE — 36415 COLL VENOUS BLD VENIPUNCTURE: CPT | Performed by: STUDENT IN AN ORGANIZED HEALTH CARE EDUCATION/TRAINING PROGRAM

## 2018-08-19 PROCEDURE — 36415 COLL VENOUS BLD VENIPUNCTURE: CPT

## 2018-08-19 PROCEDURE — 21400003 ZZH R&B CCU CRITICAL UMMC

## 2018-08-19 PROCEDURE — 25000132 ZZH RX MED GY IP 250 OP 250 PS 637: Mod: GY | Performed by: INTERNAL MEDICINE

## 2018-08-19 PROCEDURE — 85018 HEMOGLOBIN: CPT | Performed by: STUDENT IN AN ORGANIZED HEALTH CARE EDUCATION/TRAINING PROGRAM

## 2018-08-19 PROCEDURE — 83735 ASSAY OF MAGNESIUM: CPT | Performed by: COLON & RECTAL SURGERY

## 2018-08-19 PROCEDURE — 40000802 ZZH SITE CHECK

## 2018-08-19 PROCEDURE — A9270 NON-COVERED ITEM OR SERVICE: HCPCS | Mod: GY | Performed by: COLON & RECTAL SURGERY

## 2018-08-19 RX ORDER — DEXTROSE MONOHYDRATE 50 MG/ML
INJECTION, SOLUTION INTRAVENOUS
Status: COMPLETED
Start: 2018-08-19 | End: 2018-08-19

## 2018-08-19 RX ORDER — HEPARIN SODIUM 10000 [USP'U]/100ML
0-3500 INJECTION, SOLUTION INTRAVENOUS CONTINUOUS
Status: DISCONTINUED | OUTPATIENT
Start: 2018-08-19 | End: 2018-08-22

## 2018-08-19 RX ORDER — MAGNESIUM SULFATE 1 G/100ML
1 INJECTION INTRAVENOUS ONCE
Status: COMPLETED | OUTPATIENT
Start: 2018-08-19 | End: 2018-08-19

## 2018-08-19 RX ADMIN — NYSTATIN 1000000 UNITS: 100000 SUSPENSION ORAL at 17:03

## 2018-08-19 RX ADMIN — METRONIDAZOLE 500 MG: 500 INJECTION, SOLUTION INTRAVENOUS at 12:45

## 2018-08-19 RX ADMIN — HYDRALAZINE HYDROCHLORIDE 50 MG: 50 TABLET ORAL at 12:52

## 2018-08-19 RX ADMIN — METRONIDAZOLE 500 MG: 500 INJECTION, SOLUTION INTRAVENOUS at 04:12

## 2018-08-19 RX ADMIN — INSULIN ASPART 2 UNITS: 100 INJECTION, SOLUTION INTRAVENOUS; SUBCUTANEOUS at 18:51

## 2018-08-19 RX ADMIN — VANCOMYCIN HYDROCHLORIDE 125 MG: KIT at 20:09

## 2018-08-19 RX ADMIN — I.V. FAT EMULSION 250 ML: 20 EMULSION INTRAVENOUS at 22:16

## 2018-08-19 RX ADMIN — LINEZOLID 600 MG: 600 INJECTION, SOLUTION INTRAVENOUS at 15:07

## 2018-08-19 RX ADMIN — HEPARIN SODIUM 5000 UNITS: 5000 INJECTION, SOLUTION INTRAVENOUS; SUBCUTANEOUS at 09:02

## 2018-08-19 RX ADMIN — VANCOMYCIN HYDROCHLORIDE 125 MG: KIT at 09:01

## 2018-08-19 RX ADMIN — HYDRALAZINE HYDROCHLORIDE 50 MG: 50 TABLET ORAL at 09:01

## 2018-08-19 RX ADMIN — MYCOPHENOLATE MOFETIL 500 MG: 500 INJECTION, POWDER, LYOPHILIZED, FOR SOLUTION INTRAVENOUS at 20:02

## 2018-08-19 RX ADMIN — CEFEPIME HYDROCHLORIDE 1 G: 1 INJECTION, POWDER, FOR SOLUTION INTRAMUSCULAR; INTRAVENOUS at 22:15

## 2018-08-19 RX ADMIN — NYSTATIN 1000000 UNITS: 100000 SUSPENSION ORAL at 20:00

## 2018-08-19 RX ADMIN — METHYLPREDNISOLONE SODIUM SUCCINATE 8 MG: 40 INJECTION, POWDER, LYOPHILIZED, FOR SOLUTION INTRAMUSCULAR; INTRAVENOUS at 20:03

## 2018-08-19 RX ADMIN — PANTOPRAZOLE SODIUM 40 MG: 40 INJECTION, POWDER, FOR SOLUTION INTRAVENOUS at 08:57

## 2018-08-19 RX ADMIN — INSULIN ASPART 2 UNITS: 100 INJECTION, SOLUTION INTRAVENOUS; SUBCUTANEOUS at 06:19

## 2018-08-19 RX ADMIN — INSULIN ASPART 1 UNITS: 100 INJECTION, SOLUTION INTRAVENOUS; SUBCUTANEOUS at 02:12

## 2018-08-19 RX ADMIN — METHYLPREDNISOLONE SODIUM SUCCINATE 8 MG: 40 INJECTION, POWDER, LYOPHILIZED, FOR SOLUTION INTRAMUSCULAR; INTRAVENOUS at 10:16

## 2018-08-19 RX ADMIN — INSULIN ASPART 1 UNITS: 100 INJECTION, SOLUTION INTRAVENOUS; SUBCUTANEOUS at 10:23

## 2018-08-19 RX ADMIN — VANCOMYCIN HYDROCHLORIDE 125 MG: KIT at 17:03

## 2018-08-19 RX ADMIN — MAGNESIUM SULFATE IN DEXTROSE 1 G: 10 INJECTION, SOLUTION INTRAVENOUS at 17:00

## 2018-08-19 RX ADMIN — VANCOMYCIN HYDROCHLORIDE 125 MG: KIT at 12:52

## 2018-08-19 RX ADMIN — DEXTROSE MONOHYDRATE 1000 ML: 50 INJECTION, SOLUTION INTRAVENOUS at 09:08

## 2018-08-19 RX ADMIN — HYDRALAZINE HYDROCHLORIDE 50 MG: 50 TABLET ORAL at 17:03

## 2018-08-19 RX ADMIN — INSULIN ASPART 1 UNITS: 100 INJECTION, SOLUTION INTRAVENOUS; SUBCUTANEOUS at 15:09

## 2018-08-19 RX ADMIN — LINEZOLID 600 MG: 600 INJECTION, SOLUTION INTRAVENOUS at 02:10

## 2018-08-19 RX ADMIN — HYDRALAZINE HYDROCHLORIDE 50 MG: 50 TABLET ORAL at 20:08

## 2018-08-19 RX ADMIN — MYCOPHENOLATE MOFETIL 500 MG: 500 INJECTION, POWDER, LYOPHILIZED, FOR SOLUTION INTRAVENOUS at 09:08

## 2018-08-19 RX ADMIN — METRONIDAZOLE 500 MG: 500 INJECTION, SOLUTION INTRAVENOUS at 20:02

## 2018-08-19 RX ADMIN — SODIUM CHLORIDE: 234 INJECTION INTRAMUSCULAR; INTRAVENOUS; SUBCUTANEOUS at 22:15

## 2018-08-19 RX ADMIN — HEPARIN SODIUM 950 UNITS/HR: 10000 INJECTION, SOLUTION INTRAVENOUS at 12:44

## 2018-08-19 ASSESSMENT — ACTIVITIES OF DAILY LIVING (ADL)
ADLS_ACUITY_SCORE: 12

## 2018-08-19 NOTE — PROGRESS NOTES
CLINICAL NUTRITION SERVICES - BRIEF NOTE  *Received consult: Pharmacy/Nutrition to start and manage TPN (line type: peripheral)  *See RD note on 8/13 for nutrition assessment details     Nutrition Prescription    RECOMMENDATIONS FOR MDs/PROVIDERS TO ORDER:  1. Adjust IV fluids when PPN begins per provider discretion    2. Recommend place central line and transition to TPN in the next 2-3 days if unable to advance diet and/or feed gut with TF.  Unable to meet estimated nutrition needs with PPN.    Recommendations already ordered by Registered Dietitian (RD):  1. PPN, 960 mL/day x 24 hours with 55 g dextrose (GIR 0.5), 40 g AA (0.5 g/kg) + 250 mL 20% IV 5 days/week which provides 704 kcal (9 kcal/kg) and 51% kcal from fat per dosing weight 75 kg.  This meets 38% kcal needs and 44% protein needs.  2. Phos add-on     Labs: K+, Mg++ WNL. Na+ 132 (L)  (borderline H) on 8/14.  LFTs WNL on 8/14  Meds: IV fluids running @ 40 mL/hr  Renal: patient is on dialysis  GI: NG to LIS, placed yesterday.  Pt has been mostly NPO and without nutrition support for 7 days since admission.    INTERVENTIONS  Implementation  Collaboration with other providers: provider called to request starting PPN  Parenteral Nutrition/IV Fluids - Initiate (see above)    Monitoring/Evaluation  Progress toward goals will be monitored and evaluated per protocol.     Kate Roberson, RD, LD  Weekend/Holiday RD Pager: [phone]     Weekday 6C RD Pager: [phone]

## 2018-08-19 NOTE — PLAN OF CARE
Problem: Patient Care Overview  Goal: Plan of Care/Patient Progress Review    D: Pt admitted with fever/chills, found to have daniella-colonic abscess, daniella-rectal abscess, probable diverticulitis, R marge colitis, and c-diff now s/p laparoscopic assisted sigmoid colectomy with end colostomy 8/14. Hx of heart transplant 6/2018, ESRD on HD T, Th, Sa, RIJ thrombus, and DM2    I/A: Vital signs stable, afebrile, A&Ox4, SR/ST. NG placed by MD at start of shift without issue. Pt given GI cocktail prior to insertion. NG placed to low-intermittent suction and immediately drained 800 ml of black/brown fluid. Output slowed significantly post initial placement. Pt continues to endorse abdominal pain, but refusing intervention. Ostomy in place with no output or gas. IV antibiotics given as scheduled. BG check Q4H with sliding scale coverage. Pt requesting to have cares clustered as much as possible to facilitate sleep. Up with SBA/A1. NPO.     P: Possible dialysis today after receiving IV contrast 8/18. Continue to monitor and notify MD with pertinent changes.

## 2018-08-19 NOTE — PROGRESS NOTES
Cross-cover note:    08/18/2018    General Surgery Cross Cover Note    Was called by nursing regarding pt having symptoms of bloating. Feeling nauseated. Had an episode of emesis earlier today w/ greenish-black output.  No episodes of emesis since then.   Spoke to the fellow earlier today and was asked to put NG tube in.    B/P: 156/112, T: 97.7, P: 101, R: 16    PE:  Alert, awake, NAD   Breathing non-labored room air  Abd soft, mild tenderness to palpation, mild distention.  Incisions look clean/dry/intact. Ostomy bag with no gas and no stool.   Incisions clean, dry, intact    Plan:  - Placed an NG tube in.     Oh Ramey MD  General Surgery PGY-1  [phone]

## 2018-08-19 NOTE — PLAN OF CARE
Problem: Surgery Nonspecified (Adult)  Goal: Signs and Symptoms of Listed Potential Problems Will be Absent, Minimized or Managed (Surgery Nonspecified)  Signs and symptoms of listed potential problems will be absent, minimized or managed by discharge/transition of care (reference Surgery Nonspecified (Adult) CPG).   Outcome: No Change  [id number]. VSS. A&O x4. Complaints of minimal abdominal pain. NG to LIS; orders to flush Q4H (see order). Heparin gtt initiated- 10a draw at 1900. No void (on HD). Colostomy continues to have no output. Up with SBA. Mg replaced. Will start PPN and lipids this evening. Able to make needs known. Continue with POC.

## 2018-08-19 NOTE — PROGRESS NOTES
Nephrology Progress Note  08/19/2018       Murray Nicholson is a 63 year old male with Heart transplant 6/18, ESRD on HD, HTN, Right internal jugular thrombus on Aphixaban, recent admission 7/26-8/6/18  for pseudomonas bacteremia felt to be 2/2 probable prececal colitis and necrotic mouth ulcer, admitted 8/12/18 with abdominal pain, bloody stool and fever, found to have C diff infection, anorectal abscess and pericolonic abscess. Last HD 8/11/18.         ASSESSMENT AND RECOMMENDATIONS:       1. ESRD - Etiology of ESRD 2/2 HTN, DM, CRS.   Has chronic OP HD at [facility], TTS schedule, under care of Dr Mcpherson.    - Dialysis orders: Right TDC, EDW 76 kg, will run for 3 hrs tomorrow and attempt removing 3 kg    - Will continue TTS schedule (with additional dialysis in the am)      2. Volume status - was not able to weigh today. EDW 76.0 kg. No edema/dyspnea, but remains on supplemental oxygen. -170/. Is anuric.      - had to hold off on UF due to low BP    - Continue pre run weights, standing   - Strict I/O      3. HTN - stable, no longer hypotensive        4. Transplant IS regimen: Tac, MMF, Pred. TAC 8.4      5. Electrolytes - Pre run K 5.4, Na 135      6. Acid base - No acute concerns. Bicarb 25      7. BMD - corrected Ca 9.6, Phos 5.3, albumin 1.6   - Vit D/PTH being managed in OP HD unit      8. Anemia - Hgb 9.9 post op. On Epo 7600 q run. Last RBC 8/12      9. Pericolonic abscess, small perircal abscess 2.7 cm.- Underwent I/D anorectal abscess on 8/13 and  Sigmoidectomy/end colostomy on 8/14. Also has C diff. Antimicrobials per primary. CT suggestive of an ileus and NG tube was placed       10. Lung nodules - New Right lower lobe pulmonary nodules seen on CT this admission. ID recommending close f/u.     11. Ileus: NG in place       Recommendations were communicated to primary team via progress note          Interval History :     Following HD yesterday, he had CT scan, that showed an element of ileus.  "NG was placed. Feels better today. Less abdominal pain       Review of Systems:   I reviewed the following systems:  GI: NPO  Neuro:  no confusion  Constitutional:  no fever or chills  CV: denies dyspnea, CP or edema.   : Minimal urine    Physical Exam:   I/O last 3 completed shifts:  In: 2030 [P.O.:120; I.V.:1910]  Out: 2500 [Emesis/NG output:1900; Other:600]   BP (!) 160/113 (BP Location: Left arm)  Pulse 101  Temp 98.4  F (36.9  C) (Axillary)  Resp 16  Ht 1.753 m (5' 9\")  Wt 79.3 kg (174 lb 13.2 oz)  SpO2 98%  BMI 25.82 kg/m2     GENERAL APPEARANCE: Comfortable on HD  EYES: no scleral icterus, pupils equal  Pulmonary: lungs CTA. Breathing is non labored. On supplemental oxygen  CV: RRR   - Edema - none  GI: soft, appropriately tender. Colostomy/stoma pink  MS: no evidence of inflammation in joints, no muscle tenderness  SKIN: no rash, warm, dry, no cyanosis  NEURO: alert/oriented, has bilateral hand tremors  ACCESS: Right TDC    Labs:   All labs reviewed by me  Electrolytes/Renal -   Recent Labs   Lab Test  08/19/18   0617  08/18/18   0624  08/17/18   0644  08/16/18   0638   NA  132*  131*  134  135   POTASSIUM  4.1  4.6  4.3  5.4*   CHLORIDE  97  97  100  101   CO2  25  22  24  24   BUN  34*  48*  28  42*   CR  3.65*  5.37*  4.07*  6.48*   GLC  207*  181*  156*  98   TRINI  7.9*  8.0*  8.2*  7.8*   MAG  1.9  2.0  1.9  2.0   PHOS  3.1   --   3.9  5.3*       CBC -   Recent Labs   Lab Test  08/19/18   1110  08/19/18   0617  08/18/18   0624   WBC  6.0  5.2  5.5   HGB  8.9*  8.9*  9.6*   PLT  132*  145*  162       LFTs -   Recent Labs   Lab Test  08/14/18   0620  08/13/18   0618  08/08/18   0921  07/29/18   0523   ALKPHOS  110  99  156*  106   BILITOTAL  0.3  0.3  0.4  0.4   ALT  17  17  22  21   AST  29  28  34  26   PROTTOTAL  5.3*  5.2*   --   5.2*   ALBUMIN  1.6*  1.6*   --   1.9*       Iron Panel -   Recent Labs   Lab Test  07/10/18   0711  05/08/18   0954  07/19/17   1306   IRON  66  58  46   IRONSAT  28 " " 27 18   ALBERTINA  771*  621*  369       Current Medications:    acetaminophen  1,000 mg Oral 4x Daily     ceFEPIme (MAXIPIME) IV  1 g Intravenous Q24H     fluticasone  1-2 spray Both Nostrils Daily     ganciclovir (CYTOVENE) intermittent infusion  1.25 mg/kg Intravenous Once per day on Mon Wed Fri     hydrALAZINE  50 mg Oral 4x Daily     insulin aspart  1-6 Units Subcutaneous Q4H     linezolid  600 mg Intravenous Q12H     magnesium sulfate  1 g Intravenous Once     methylPREDNISolone  8 mg Intravenous Q12H     metroNIDAZOLE  500 mg Intravenous Q8H     mycophenolate  500 mg Intravenous Q12H     nystatin  1,000,000 Units Swish & Swallow 4x Daily     pantoprazole  40 mg Intravenous Q24H     sodium chloride (PF)  3 mL Intracatheter Q8H     sodium chloride (PF)  3 mL Intracatheter Q8H     triamcinolone   Topical BID     vancomycin  125 mg Oral 4x Daily       bupivacaine liposome (EXPAREL) LONG ACTING injection was administered into the infiltration site to produce postsurgical analgesia. Duration of action is up to 72 hours, and other \"judith\" medications should not be given for 96 hours with the exception of the lidocaine 5% patch (LIDODERM) and the lidocaine 10mg in potassium infusions. This entry is for INFORMATION ONLY.       IV fluid REPLACEMENT ONLY       dextrose 10 mL/hr at 08/18/18 2347     HEParin 950 Units/hr (08/19/18 1244)     lactated ringers 40 mL/hr at 08/18/18 2348     - MEDICATION INSTRUCTIONS -       Gilberto Ulloa MD  "

## 2018-08-19 NOTE — PROGRESS NOTES
Colorectal Surgery Progress Note  POD#5    Subjective:  CT yesterday showing ileus. NG placed overnight after episode of emesis with return of 800cc upon placement. Positioning confirmed on XR. Patient's abdominal discomfort slightly improved per patient. No gas or liquid in ostomy bag. Afebrile.     Vitals:  Vitals:    08/18/18 2007 08/18/18 2008 08/18/18 2333 08/19/18 0634   BP: (!) 155/121 (!) 156/112 122/88    BP Location: Right arm Right arm Left arm    Pulse:       Resp: 16  16    Temp: 97.7  F (36.5  C)  98.8  F (37.1  C)    TempSrc: Oral  Oral    SpO2: 99%  96%    Weight:    79.3 kg (174 lb 13.2 oz)   Height:           I/O:  I/O last 3 completed shifts:  In: 2030 [P.O.:120; I.V.:1910]  Out: 2500 [Emesis/NG output:1900; Other:600]    Physical Exam:   Gen: AAOx3, NAD  Pulm: Non-labored breathing on room air  Abd: Soft, mild distention, appropriately tender, no guarding   Incision C/D/I with dermabond at low midline incision, without erythema or drainage   Stoma pink and viable with no stool or gas in bag  Ext:  Warm and well-perfused    BMP    Recent Labs  Lab 08/19/18 0617 08/18/18 0624 08/17/18  0644 08/16/18  0638 08/15/18  0638 08/14/18  0620   * 131* 134 135 134 133   POTASSIUM 4.1 4.6 4.3 5.4* 4.4 4.5   CHLORIDE 97 97 100 101 99 102   CO2 25 22 24 24 25 20   BUN 34* 48* 28 42* 30 48*   CR 3.65* 5.37* 4.07* 6.48* 5.20* 7.08*   * 181* 156* 98 79 106*   MAG 1.9 2.0 1.9 2.0 1.9 2.5*   PHOS  --   --  3.9 5.3* 5.0* 5.7*     CBC    Recent Labs  Lab 08/19/18 0617 08/18/18 0624 08/17/18  0644 08/16/18  0638   WBC 5.2 5.5 6.5 6.0   HGB 8.9* 9.6* 9.8* 9.9*   HCT 28.3* 29.9* 30.5* 30.4*   * 162 141* 114*     IMAGING:  CT Abd/Pelvis 8/18:  Impression:  1.  Postsurgical changes of partial sigmoidectomy, partial small bowel  resection and left lower quadrant colostomy. Small amount of free air  and ascites, potentially postsurgical.  2.  Dilated loops of bowel potentially representing ileus or  mild  partial obstruction.  3.  1.5 cm area of focal apparent enhancement in the small bowel the  left lower quadrant. This is indeterminate, though follow-up is  warranted as it may represent a small enhancing mass. Consider  outpatient follow-up CT enterography or MR enterography.  4. Small left pleural effusion. Lung base pulmonary nodules again  seen, unchanged from 8/12/2026. Consider follow-up imaging in 3  months.     [Consider Follow Up: Lung nodules, enhancing possible mass in small  Bowel.]    AXR 8/18:  Impression:   1. Enteric tube tip and sidehole project over the proximal stomach.  2. Nonspecific bowel gas pattern with gaseous distention of bowel  loops in the central abdomen and right lower quadrant.       ASSESSMENT:    63 year old male h/o heart TXP 6/2018 on immunosuppressants, ESRD on HD, R IJ thrombus on apixaban (held, last dose 2d ago), T2DM, ongoing pseudomonal bacteremia p/w abd pain & BRBPR w/first episode of diverticulitis c/b paracolonic abscess seen on CT.  Co-incidentally there is a right-sided anorectal abscess as well. Path confirmed diverticulitis with associated perforations.      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     - PRN IV Dilaudid.  - Keep NPO while NG tube in place. Recommend starting parenteral nutrition  - Recommend switching any oral medications to IV alternatives given concern for poor absorption in setting of ileus  - Electrolyte replacement to keep Mg >2, Phos >3, K >4  - okay to start heparin gtt without bolus, will defer start time to primary team, however please monitor hgb once at therapeautic levels of heparin and if no evidence of bleeding okay to resume home apixaban within 24 hours of therapeutic heparin.  - 48 hours of post-op abx already completed for intra-abdominal  contamination, defer final abx duration to primary/ID  - continue p.o. Vanc for Cdiff per primary/ID  - WOCN teaching      Appreciate excellent cares by primary team. Colorectal will continue to follow. Please call with any questions or concerns.     Sp Dunlap MD   PGY-3, GEN SURG      Patient was seen with fellow and discussed with staff.

## 2018-08-19 NOTE — PROGRESS NOTES
Cardiology 2 Progress Note           Assessment and Plan:   Murray Nicholson is a 63 year old male with a h/o NICM s/p heart txp (6/14/18), ESRD on HD, R internal jugular thrombus on apixaban and DM2 p/w 3-4 bright red BM and fevers/chills, found to have a daniella-colonic abscess (7.8 cm), daniella-rectal abscess (2.7 cm), probable diverticulitis, and R colon colitis.  Planned for daniella-rectal abscess drainage tonight, likely will require colectomy later this week pending discussion between colorectal and IR.    Changes today:  - POD#4 for laparoscopic assisted sigmoid colectomy with end colostomy  - Had NG placed overnight  - Starting PPN today, if no ostomy output within 2 days would plan for TPN  - Start heparin gtt (recent hx line-associated thrombus), trend Hgb  - Continuing cefepime, flagyl, linezolid, PO vanc, ganciclovir  - Holding tacro given elevated level    # Daniella-colonic abscess (7.8 cm) s/p laparoscopic assisted sigmoid colectomy with end colostomy 8/14) positive for VRE  # Daniella-rectal abscess (2.7 cm) s/p drainage 8/12  # R colon colitis  # Positive c. Diff  CT abdomen/pelvis notable for dilated loops of bowel.  Also notable for 1.5 cm area of focal apparent enhancement in the small bowel in the LLQ.  NG placed overnight.  On heparin gtt, had provoked R internal jugular line-associated clot last admission.  On cefepime, flagyl, PO vanc, ganciclovir.  - Colorectal, transplant ID following, appreciate assistance  - PRN morphine for pain  - Heparin gtt, Hgb Q12h    # S/p heart transplant 6/13/2018 (donor 3 vessel CAD  HSV1-, HSV1+, CMV D+/R-, EBV D?/R+, VZV+  tacro goal 8 due to infx, last heart bx 7/5/2018 was 0R  8/10/2018 bx results still pending)   - Lowered prednisone to 10/10 this admit  - Continue MMF, holding tacro, level still elevated @22 8/17  - Continue statin  - Received pentamadine 8/17  - RHC biopsy from 8/10 pending    # Recent pseudomonas bacteremia  # Necrotic mouth ulcer, resolving  Had  profound neutropenia and pseudomonas bacteremia several weeks ago in associated with necrotic mouth ulcer; neutropenia likely immunosuppression induced.  Neutropenia resolved last admission and pseudomonal bacteremia is likely cleared.  Necrotic ulcer appears to be resolving from prior admission.  Not currently active issues but will continue to monitor.    # Pulmonary nodules  Incidental on CT, will need interval follow up in 4-6 weeks.    # Recent R internal jugular thrombus  - Holding apixaban in setting of recent surgery  - heparin gtt    # ESRD on TTS HD  - Nephrology consulted, appreciate assistance    # DM2 - SSI    FEN: NPO  PPx: Heparin gtt  Dispo: PT/OT to see when appropriate    Bobby Bearden MD  Internal Medicine PGY-3  [phone]    Patient discussed with Dr. Austin.       Subjective:   Overnight had NG placed.  This AM still no ostomy output, had total of 1.8 L from NG/emesis.  Only 100 out this AM.  Feels better overall.          Medications:       acetaminophen  1,000 mg Oral 4x Daily     ceFEPIme (MAXIPIME) IV  1 g Intravenous Q24H     fluticasone  1-2 spray Both Nostrils Daily     ganciclovir (CYTOVENE) intermittent infusion  1.25 mg/kg Intravenous Once per day on Mon Wed Fri     heparin  5,000 Units Subcutaneous Q12H     hydrALAZINE  50 mg Oral 4x Daily     insulin aspart  1-6 Units Subcutaneous Q4H     linezolid  600 mg Intravenous Q12H     methylPREDNISolone  8 mg Intravenous Q12H     metroNIDAZOLE  500 mg Intravenous Q8H     mycophenolate  500 mg Intravenous Q12H     nystatin  1,000,000 Units Swish & Swallow 4x Daily     pantoprazole  40 mg Intravenous Q24H     sodium chloride (PF)  3 mL Intracatheter Q8H     sodium chloride (PF)  3 mL Intracatheter Q8H     triamcinolone   Topical BID     vancomycin  125 mg Oral 4x Daily       bupivacaine liposome (EXPAREL) LONG ACTING injection was administered into the infiltration site to produce postsurgical analgesia. Duration of action is up to 72  "hours, and other \"judith\" medications should not be given for 96 hours with the exception of the lidocaine 5% patch (LIDODERM) and the lidocaine 10mg in potassium infusions. This entry is for INFORMATION ONLY.       IV fluid REPLACEMENT ONLY       dextrose 10 mL/hr at 08/18/18 2347     lactated ringers 40 mL/hr at 08/18/18 2348     - MEDICATION INSTRUCTIONS -                 Objective:          Physical Exam:   Temp:  [97.7  F (36.5  C)-98.8  F (37.1  C)] 98.8  F (37.1  C)  Pulse:  [101] 101  Heart Rate:  [] 97  Resp:  [16-18] 16  BP: ()/() 122/88  Cuff Mean (mmHg):  [99] 99  SpO2:  [96 %-100 %] 96 %  I/O last 3 completed shifts:  In: 2030 [P.O.:120; I.V.:1910]  Out: 2500 [Emesis/NG output:1900; Other:600]    Vitals:    08/12/18 0228 08/14/18 0500 08/16/18 0953 08/18/18 0730   Weight: 75 kg (165 lb 5.5 oz) 78.8 kg (173 lb 11.6 oz) 79.1 kg (174 lb 6.1 oz) 79.1 kg (174 lb 6.1 oz)    08/19/18 0634   Weight: 79.3 kg (174 lb 13.2 oz)       GEN: Laying in bed, appears fatigued but not in acute distress  HEENT: Mouth ulcer unchanged from prior exams, no new redness/swelling  PULM: CTAB from anterior exam  CV: Regular rate and rhythm.  JVP not elevated   ABD: Colostomy site without surrounding redness.  Incision C/D/I.  NBS.  Mild diffuse tenderness to palpation in all quadrants  EXT: Trace lower extremity edema         Data:   BMP    Recent Labs  Lab 08/19/18  0617 08/18/18  0624 08/17/18  0644 08/16/18  0638   * 131* 134 135   POTASSIUM 4.1 4.6 4.3 5.4*   CHLORIDE 97 97 100 101   TRINI 7.9* 8.0* 8.2* 7.8*   CO2 25 22 24 24   BUN 34* 48* 28 42*   CR 3.65* 5.37* 4.07* 6.48*   * 181* 156* 98     LFTs    Recent Labs  Lab 08/14/18  0620 08/13/18  0618   ALKPHOS 110 99   AST 29 28   ALT 17 17   BILITOTAL 0.3 0.3   PROTTOTAL 5.3* 5.2*   ALBUMIN 1.6* 1.6*      CBC    Recent Labs  Lab 08/19/18  0617 08/18/18  0624 08/17/18  0644 08/16/18  0638   WBC 5.2 5.5 6.5 6.0   RBC 3.13* 3.38* 3.43* 3.37*   HGB " 8.9* 9.6* 9.8* 9.9*   HCT 28.3* 29.9* 30.5* 30.4*   MCV 90 89 89 90   MCH 28.4 28.4 28.6 29.4   MCHC 31.4* 32.1 32.1 32.6   RDW 19.5* 19.6* 19.8* 20.4*   * 162 141* 114*     INR    Recent Labs  Lab 08/12/18  0834   INR 1.31*     Faculty Attestation  Rhett Austin M.D.    I personally saw and examined this patient, reviewed recent laboratories and imaging studies, discussed the case with the housestaff, and conveyed plan to the patient.  I answered all questions from patient and/or family. I agree with the examination, assessment and plan outlined here.      General:  Chest:  Cor:  Abd:  Extr:

## 2018-08-20 ENCOUNTER — APPOINTMENT (OUTPATIENT)
Dept: GENERAL RADIOLOGY | Facility: CLINIC | Age: 63
DRG: 329 | End: 2018-08-20
Payer: MEDICARE

## 2018-08-20 LAB
ALBUMIN SERPL-MCNC: 1.7 G/DL (ref 3.4–5)
ALP SERPL-CCNC: 170 U/L (ref 40–150)
ALT SERPL W P-5'-P-CCNC: 18 U/L (ref 0–70)
ANION GAP SERPL CALCULATED.3IONS-SCNC: 10 MMOL/L (ref 3–14)
AST SERPL W P-5'-P-CCNC: 41 U/L (ref 0–45)
BASOPHILS # BLD AUTO: 0 10E9/L (ref 0–0.2)
BASOPHILS NFR BLD AUTO: 0 %
BILIRUB SERPL-MCNC: 0.4 MG/DL (ref 0.2–1.3)
BUN SERPL-MCNC: 21 MG/DL (ref 7–30)
CALCIUM SERPL-MCNC: 8.8 MG/DL (ref 8.5–10.1)
CHLORIDE SERPL-SCNC: 98 MMOL/L (ref 94–109)
CO2 SERPL-SCNC: 26 MMOL/L (ref 20–32)
CREAT SERPL-MCNC: 2.2 MG/DL (ref 0.66–1.25)
DIFFERENTIAL METHOD BLD: ABNORMAL
EOSINOPHIL # BLD AUTO: 0 10E9/L (ref 0–0.7)
EOSINOPHIL NFR BLD AUTO: 0.3 %
ERYTHROCYTE [DISTWIDTH] IN BLOOD BY AUTOMATED COUNT: 18.6 % (ref 10–15)
GFR SERPL CREATININE-BSD FRML MDRD: 30 ML/MIN/1.7M2
GLUCOSE BLDC GLUCOMTR-MCNC: 213 MG/DL (ref 70–99)
GLUCOSE BLDC GLUCOMTR-MCNC: 216 MG/DL (ref 70–99)
GLUCOSE BLDC GLUCOMTR-MCNC: 228 MG/DL (ref 70–99)
GLUCOSE BLDC GLUCOMTR-MCNC: 229 MG/DL (ref 70–99)
GLUCOSE BLDC GLUCOMTR-MCNC: 237 MG/DL (ref 70–99)
GLUCOSE SERPL-MCNC: 209 MG/DL (ref 70–99)
HCT VFR BLD AUTO: 27.9 % (ref 40–53)
HGB BLD-MCNC: 7.9 G/DL (ref 13.3–17.7)
HGB BLD-MCNC: 9.1 G/DL (ref 13.3–17.7)
IMM GRANULOCYTES # BLD: 0.1 10E9/L (ref 0–0.4)
IMM GRANULOCYTES NFR BLD: 1 %
INR PPP: 0.99 (ref 0.86–1.14)
LMWH PPP CHRO-ACNC: 0.22 IU/ML
LMWH PPP CHRO-ACNC: 0.44 IU/ML
LMWH PPP CHRO-ACNC: 0.45 IU/ML
LYMPHOCYTES # BLD AUTO: 0.2 10E9/L (ref 0.8–5.3)
LYMPHOCYTES NFR BLD AUTO: 2.4 %
MAGNESIUM SERPL-MCNC: 1.7 MG/DL (ref 1.6–2.3)
MCH RBC QN AUTO: 28.5 PG (ref 26.5–33)
MCHC RBC AUTO-ENTMCNC: 32.6 G/DL (ref 31.5–36.5)
MCV RBC AUTO: 88 FL (ref 78–100)
MONOCYTES # BLD AUTO: 0.2 10E9/L (ref 0–1.3)
MONOCYTES NFR BLD AUTO: 3 %
NEUTROPHILS # BLD AUTO: 6.6 10E9/L (ref 1.6–8.3)
NEUTROPHILS NFR BLD AUTO: 93.3 %
NRBC # BLD AUTO: 0 10*3/UL
NRBC BLD AUTO-RTO: 0 /100
PHOSPHATE SERPL-MCNC: 1.5 MG/DL (ref 2.5–4.5)
PLATELET # BLD AUTO: 157 10E9/L (ref 150–450)
POTASSIUM SERPL-SCNC: 3.6 MMOL/L (ref 3.4–5.3)
PREALB SERPL IA-MCNC: 31 MG/DL (ref 15–45)
PROT SERPL-MCNC: 6.1 G/DL (ref 6.8–8.8)
RBC # BLD AUTO: 3.19 10E12/L (ref 4.4–5.9)
SODIUM SERPL-SCNC: 134 MMOL/L (ref 133–144)
TACROLIMUS BLD-MCNC: 7.5 UG/L (ref 5–15)
TME LAST DOSE: NORMAL H
TRIGL SERPL-MCNC: 146 MG/DL
WBC # BLD AUTO: 7 10E9/L (ref 4–11)

## 2018-08-20 PROCEDURE — 80197 ASSAY OF TACROLIMUS: CPT | Performed by: STUDENT IN AN ORGANIZED HEALTH CARE EDUCATION/TRAINING PROGRAM

## 2018-08-20 PROCEDURE — 83735 ASSAY OF MAGNESIUM: CPT | Performed by: COLON & RECTAL SURGERY

## 2018-08-20 PROCEDURE — 40000903 ZZH STATISTIC WOC PT EDUCATION, 31-45 MIN

## 2018-08-20 PROCEDURE — 25000125 ZZHC RX 250: Performed by: COLON & RECTAL SURGERY

## 2018-08-20 PROCEDURE — 25000125 ZZHC RX 250: Performed by: INTERNAL MEDICINE

## 2018-08-20 PROCEDURE — 99233 SBSQ HOSP IP/OBS HIGH 50: CPT | Mod: GC | Performed by: INTERNAL MEDICINE

## 2018-08-20 PROCEDURE — 25000132 ZZH RX MED GY IP 250 OP 250 PS 637: Mod: GY | Performed by: COLON & RECTAL SURGERY

## 2018-08-20 PROCEDURE — A9270 NON-COVERED ITEM OR SERVICE: HCPCS | Mod: GY | Performed by: COLON & RECTAL SURGERY

## 2018-08-20 PROCEDURE — A9270 NON-COVERED ITEM OR SERVICE: HCPCS | Mod: GY | Performed by: INTERNAL MEDICINE

## 2018-08-20 PROCEDURE — 85520 HEPARIN ASSAY: CPT | Performed by: INTERNAL MEDICINE

## 2018-08-20 PROCEDURE — 25000128 H RX IP 250 OP 636

## 2018-08-20 PROCEDURE — 85018 HEMOGLOBIN: CPT | Performed by: STUDENT IN AN ORGANIZED HEALTH CARE EDUCATION/TRAINING PROGRAM

## 2018-08-20 PROCEDURE — 90937 HEMODIALYSIS REPEATED EVAL: CPT

## 2018-08-20 PROCEDURE — 84478 ASSAY OF TRIGLYCERIDES: CPT | Performed by: COLON & RECTAL SURGERY

## 2018-08-20 PROCEDURE — 25000128 H RX IP 250 OP 636: Performed by: COLON & RECTAL SURGERY

## 2018-08-20 PROCEDURE — 80053 COMPREHEN METABOLIC PANEL: CPT | Performed by: COLON & RECTAL SURGERY

## 2018-08-20 PROCEDURE — 25000128 H RX IP 250 OP 636: Performed by: STUDENT IN AN ORGANIZED HEALTH CARE EDUCATION/TRAINING PROGRAM

## 2018-08-20 PROCEDURE — 40000986 XR ABDOMEN PORT 1 VW

## 2018-08-20 PROCEDURE — 36415 COLL VENOUS BLD VENIPUNCTURE: CPT | Performed by: INTERNAL MEDICINE

## 2018-08-20 PROCEDURE — 36415 COLL VENOUS BLD VENIPUNCTURE: CPT | Performed by: STUDENT IN AN ORGANIZED HEALTH CARE EDUCATION/TRAINING PROGRAM

## 2018-08-20 PROCEDURE — 25000132 ZZH RX MED GY IP 250 OP 250 PS 637: Mod: GY | Performed by: STUDENT IN AN ORGANIZED HEALTH CARE EDUCATION/TRAINING PROGRAM

## 2018-08-20 PROCEDURE — 00000146 ZZHCL STATISTIC GLUCOSE BY METER IP

## 2018-08-20 PROCEDURE — 25000125 ZZHC RX 250: Performed by: STUDENT IN AN ORGANIZED HEALTH CARE EDUCATION/TRAINING PROGRAM

## 2018-08-20 PROCEDURE — 25000132 ZZH RX MED GY IP 250 OP 250 PS 637: Mod: GY

## 2018-08-20 PROCEDURE — A9270 NON-COVERED ITEM OR SERVICE: HCPCS | Mod: GY

## 2018-08-20 PROCEDURE — 21400003 ZZH R&B CCU CRITICAL UMMC

## 2018-08-20 PROCEDURE — 85520 HEPARIN ASSAY: CPT | Performed by: COLON & RECTAL SURGERY

## 2018-08-20 PROCEDURE — 25000132 ZZH RX MED GY IP 250 OP 250 PS 637: Mod: GY | Performed by: INTERNAL MEDICINE

## 2018-08-20 PROCEDURE — 25000128 H RX IP 250 OP 636: Performed by: INTERNAL MEDICINE

## 2018-08-20 PROCEDURE — 85025 COMPLETE CBC W/AUTO DIFF WBC: CPT | Performed by: COLON & RECTAL SURGERY

## 2018-08-20 PROCEDURE — 84134 ASSAY OF PREALBUMIN: CPT | Performed by: COLON & RECTAL SURGERY

## 2018-08-20 PROCEDURE — 85610 PROTHROMBIN TIME: CPT | Performed by: COLON & RECTAL SURGERY

## 2018-08-20 PROCEDURE — 84100 ASSAY OF PHOSPHORUS: CPT | Performed by: COLON & RECTAL SURGERY

## 2018-08-20 RX ORDER — SACCHAROMYCES BOULARDII 250 MG
250 CAPSULE ORAL 2 TIMES DAILY
Status: DISCONTINUED | OUTPATIENT
Start: 2018-08-20 | End: 2018-08-22

## 2018-08-20 RX ORDER — DEXTROSE MONOHYDRATE 50 MG/ML
INJECTION, SOLUTION INTRAVENOUS
Status: COMPLETED
Start: 2018-08-20 | End: 2018-08-20

## 2018-08-20 RX ORDER — METOCLOPRAMIDE HYDROCHLORIDE 5 MG/ML
5 INJECTION INTRAMUSCULAR; INTRAVENOUS ONCE
Status: DISCONTINUED | OUTPATIENT
Start: 2018-08-20 | End: 2018-08-23

## 2018-08-20 RX ADMIN — VANCOMYCIN HYDROCHLORIDE 125 MG: KIT at 08:09

## 2018-08-20 RX ADMIN — VANCOMYCIN HYDROCHLORIDE 125 MG: KIT at 16:12

## 2018-08-20 RX ADMIN — SODIUM CHLORIDE: 234 INJECTION INTRAMUSCULAR; INTRAVENOUS; SUBCUTANEOUS at 22:13

## 2018-08-20 RX ADMIN — VANCOMYCIN HYDROCHLORIDE 125 MG: KIT at 12:33

## 2018-08-20 RX ADMIN — CALCIUM GLUCONATE 2 G: 98 INJECTION, SOLUTION INTRAVENOUS at 15:20

## 2018-08-20 RX ADMIN — HYDRALAZINE HYDROCHLORIDE 50 MG: 50 TABLET ORAL at 12:40

## 2018-08-20 RX ADMIN — NYSTATIN 1000000 UNITS: 100000 SUSPENSION ORAL at 08:09

## 2018-08-20 RX ADMIN — PANTOPRAZOLE SODIUM 40 MG: 40 INJECTION, POWDER, FOR SOLUTION INTRAVENOUS at 08:10

## 2018-08-20 RX ADMIN — CEFEPIME HYDROCHLORIDE 1 G: 1 INJECTION, POWDER, FOR SOLUTION INTRAMUSCULAR; INTRAVENOUS at 22:12

## 2018-08-20 RX ADMIN — Medication 2 G: at 16:12

## 2018-08-20 RX ADMIN — LINEZOLID 600 MG: 600 INJECTION, SOLUTION INTRAVENOUS at 01:57

## 2018-08-20 RX ADMIN — MYCOPHENOLATE MOFETIL 500 MG: 500 INJECTION, POWDER, LYOPHILIZED, FOR SOLUTION INTRAVENOUS at 08:21

## 2018-08-20 RX ADMIN — SODIUM CHLORIDE 250 ML: 9 INJECTION, SOLUTION INTRAVENOUS at 10:15

## 2018-08-20 RX ADMIN — NYSTATIN 1000000 UNITS: 100000 SUSPENSION ORAL at 16:11

## 2018-08-20 RX ADMIN — METRONIDAZOLE 500 MG: 500 INJECTION, SOLUTION INTRAVENOUS at 04:03

## 2018-08-20 RX ADMIN — HYDRALAZINE HYDROCHLORIDE 50 MG: 50 TABLET ORAL at 20:16

## 2018-08-20 RX ADMIN — GANCICLOVIR SODIUM 100 MG: 500 INJECTION, POWDER, LYOPHILIZED, FOR SOLUTION INTRAVENOUS at 17:40

## 2018-08-20 RX ADMIN — LINEZOLID 600 MG: 600 INJECTION, SOLUTION INTRAVENOUS at 14:20

## 2018-08-20 RX ADMIN — NYSTATIN 1000000 UNITS: 100000 SUSPENSION ORAL at 20:16

## 2018-08-20 RX ADMIN — INSULIN ASPART 2 UNITS: 100 INJECTION, SOLUTION INTRAVENOUS; SUBCUTANEOUS at 05:30

## 2018-08-20 RX ADMIN — Medication 250 MG: at 12:55

## 2018-08-20 RX ADMIN — MYCOPHENOLATE MOFETIL 500 MG: 500 INJECTION, POWDER, LYOPHILIZED, FOR SOLUTION INTRAVENOUS at 20:09

## 2018-08-20 RX ADMIN — VANCOMYCIN HYDROCHLORIDE 125 MG: KIT at 20:17

## 2018-08-20 RX ADMIN — SODIUM CHLORIDE, POTASSIUM CHLORIDE, SODIUM LACTATE AND CALCIUM CHLORIDE: 600; 310; 30; 20 INJECTION, SOLUTION INTRAVENOUS at 12:32

## 2018-08-20 RX ADMIN — HYDRALAZINE HYDROCHLORIDE 50 MG: 50 TABLET ORAL at 16:11

## 2018-08-20 RX ADMIN — METRONIDAZOLE 500 MG: 500 INJECTION, SOLUTION INTRAVENOUS at 20:08

## 2018-08-20 RX ADMIN — Medication 250 MG: at 20:17

## 2018-08-20 RX ADMIN — SODIUM CHLORIDE 300 ML: 9 INJECTION, SOLUTION INTRAVENOUS at 10:15

## 2018-08-20 RX ADMIN — INSULIN ASPART 2 UNITS: 100 INJECTION, SOLUTION INTRAVENOUS; SUBCUTANEOUS at 02:04

## 2018-08-20 RX ADMIN — METRONIDAZOLE 500 MG: 500 INJECTION, SOLUTION INTRAVENOUS at 12:34

## 2018-08-20 RX ADMIN — INSULIN ASPART 2 UNITS: 100 INJECTION, SOLUTION INTRAVENOUS; SUBCUTANEOUS at 22:18

## 2018-08-20 RX ADMIN — I.V. FAT EMULSION 250 ML: 20 EMULSION INTRAVENOUS at 22:14

## 2018-08-20 RX ADMIN — METHYLPREDNISOLONE SODIUM SUCCINATE 8 MG: 40 INJECTION, POWDER, LYOPHILIZED, FOR SOLUTION INTRAMUSCULAR; INTRAVENOUS at 08:16

## 2018-08-20 RX ADMIN — HEPARIN SODIUM 450 UNITS/HR: 10000 INJECTION, SOLUTION INTRAVENOUS at 23:21

## 2018-08-20 RX ADMIN — INSULIN ASPART 2 UNITS: 100 INJECTION, SOLUTION INTRAVENOUS; SUBCUTANEOUS at 12:36

## 2018-08-20 RX ADMIN — INSULIN ASPART 2 UNITS: 100 INJECTION, SOLUTION INTRAVENOUS; SUBCUTANEOUS at 17:49

## 2018-08-20 RX ADMIN — DEXTROSE MONOHYDRATE 250 ML: 50 INJECTION, SOLUTION INTRAVENOUS at 08:28

## 2018-08-20 RX ADMIN — NYSTATIN 1000000 UNITS: 100000 SUSPENSION ORAL at 12:32

## 2018-08-20 RX ADMIN — METHYLPREDNISOLONE SODIUM SUCCINATE 8 MG: 40 INJECTION, POWDER, LYOPHILIZED, FOR SOLUTION INTRAMUSCULAR; INTRAVENOUS at 20:09

## 2018-08-20 ASSESSMENT — ACTIVITIES OF DAILY LIVING (ADL)
ADLS_ACUITY_SCORE: 12

## 2018-08-20 NOTE — PROGRESS NOTES
Cardiology 2 Progress Note           Assessment and Plan:   Murray Nicholson is a 63 year old male with a h/o NICM s/p heart txp (6/14/18), ESRD on HD, R internal jugular thrombus on apixaban and DM2 p/w 3-4 bright red BM and fevers/chills, found to have a daniella-colonic abscess (7.8 cm), daniella-rectal abscess (2.7 cm), probable diverticulitis, and R colon colitis.  Planned for daniella-rectal abscess drainage tonight, likely will require colectomy later this week pending discussion between colorectal and IR.    Changes today:  - POD#6 for laparoscopic assisted sigmoid colectomy with end colostomy  - Surgical path from 8/14 resulted  - On PPN  - Start heparin gtt (recent hx line-associated thrombus), trend Hgb  - Continuing cefepime, flagyl, linezolid, PO vanc, ganciclovir  - Tacro level pending    # Daniella-colonic abscess (7.8 cm) s/p laparoscopic assisted sigmoid colectomy with end colostomy 8/14) positive for VRE  # Daniella-rectal abscess (2.7 cm) s/p drainage 8/12  # R colon colitis  # Positive c. Diff  Small bowel wall with fistulous tract lined by inflamed granulation tissue, colonic wall with diverticulosis and associated perforations lined by acute and chronic inflammation, foreign body giant cell reaction and granulation tissue formation.  CT abdomen/pelvis notable for dilated loops of bowel.  Also notable for 1.5 cm area of focal apparent enhancement in the small bowel in the LLQ.  Not clear if significant, have asked colorectal to review, will follow up.  NG in, had 580 recorded yesterday, 100 this AM.  On heparin gtt, had provoked R internal jugular line-associated clot last admission.  On cefepime, flagyl, PO vanc, ganciclovir.  - Colorectal, transplant ID following, appreciate assistance  - PRN morphine for pain  - Heparin gtt, Hgb Q12h    # S/p heart transplant 6/13/2018 (donor 3 vessel CAD  HSV1-, HSV1+, CMV D+/R-, EBV D?/R+, VZV+  tacro goal 8 due to infx, last heart bx 7/5/2018 was 0R  8/10/2018 bx results still  "pending)   - Lowered prednisone to 10/10 this admit  - Continue MMF, holding tacro, level still elevated @22 8/17  - Continue statin  - Received pentamadine 8/17  - Continue nystatin swish  - RHC biopsy from 8/10 without rejection    # Recent pseudomonas bacteremia  # Necrotic mouth ulcer, resolving  Had profound neutropenia and pseudomonas bacteremia several weeks ago in associated with necrotic mouth ulcer; neutropenia likely immunosuppression induced.  Neutropenia resolved last admission and pseudomonal bacteremia is likely cleared.  Necrotic ulcer appears to be resolving from prior admission.  Not currently active issues but will continue to monitor.    # Pulmonary nodules  Incidental on CT, will need interval follow up in 4-6 weeks.    # Recent R internal jugular thrombus  - Holding apixaban in setting of recent surgery  - heparin gtt    # ESRD on TTS HD  - Nephrology consulted, appreciate assistance    # DM2 - SSI    FEN: NPO  PPx: Heparin gtt  Dispo: PT/OT to see when appropriate    Bobby Bearden MD  Internal Medicine PGY-3  [phone]    Patient discussed with Dr. Ernst.       Subjective:   Overnight no acute events.  This AM feels \"better overall,\" still no stool output.          Medications:       acetaminophen  1,000 mg Oral 4x Daily     ceFEPIme (MAXIPIME) IV  1 g Intravenous Q24H     fluticasone  1-2 spray Both Nostrils Daily     ganciclovir (CYTOVENE) intermittent infusion  1.25 mg/kg Intravenous Once per day on Mon Wed Fri     hydrALAZINE  50 mg Oral 4x Daily     insulin aspart  1-6 Units Subcutaneous Q4H     linezolid  600 mg Intravenous Q12H     lipids  250 mL Intravenous Once per day on Sun Mon Tue Wed Thu     methylPREDNISolone  8 mg Intravenous Q12H     metroNIDAZOLE  500 mg Intravenous Q8H     mycophenolate  500 mg Intravenous Q12H     nystatin  1,000,000 Units Swish & Swallow 4x Daily     pantoprazole  40 mg Intravenous Q24H     sodium chloride (PF)  3 mL Intracatheter Q8H     sodium " chloride (PF)  3 mL Intracatheter Q8H     triamcinolone   Topical BID     vancomycin  125 mg Oral 4x Daily       IV fluid REPLACEMENT ONLY       IV fluid REPLACEMENT ONLY       HEParin 600 Units/hr (08/20/18 0156)     lactated ringers 40 mL/hr at 08/19/18 2335     - MEDICATION INSTRUCTIONS -       parenteral nutrition - ADULT compounded formula 40 mL/hr at 08/19/18 2335               Objective:          Physical Exam:   Temp:  [98.4  F (36.9  C)-98.6  F (37  C)] 98.4  F (36.9  C)  Heart Rate:  [90-96] 90  Resp:  [16-18] 18  BP: (144-173)/(104-114) 148/113  SpO2:  [98 %-100 %] 99 %  I/O last 3 completed shifts:  In: 1699.05 [I.V.:1534.01; NG/GT:60]  Out: 580 [Emesis/NG output:580]    Vitals:    08/12/18 0228 08/14/18 0500 08/16/18 0953 08/18/18 0730   Weight: 75 kg (165 lb 5.5 oz) 78.8 kg (173 lb 11.6 oz) 79.1 kg (174 lb 6.1 oz) 79.1 kg (174 lb 6.1 oz)    08/19/18 0634   Weight: 79.3 kg (174 lb 13.2 oz)       GEN: Laying in bed, appears fatigued but not in acute distress  HEENT: Mouth ulcer unchanged from prior exams, no new redness/swelling  PULM: CTAB from anterior exam  CV: Regular rate and rhythm.  JVP not elevated   ABD: Colostomy site without surrounding redness.  Incision C/D/I.  NBS.  Mild diffuse tenderness to palpation in all quadrants  EXT: Trace lower extremity edema         Data:   BMP    Recent Labs  Lab 08/19/18  0617 08/18/18  0624 08/17/18  0644 08/16/18  0638   * 131* 134 135   POTASSIUM 4.1 4.6 4.3 5.4*   CHLORIDE 97 97 100 101   TRINI 7.9* 8.0* 8.2* 7.8*   CO2 25 22 24 24   BUN 34* 48* 28 42*   CR 3.65* 5.37* 4.07* 6.48*   * 181* 156* 98     LFTs    Recent Labs  Lab 08/14/18  0620   ALKPHOS 110   AST 29   ALT 17   BILITOTAL 0.3   PROTTOTAL 5.3*   ALBUMIN 1.6*      CBC    Recent Labs  Lab 08/19/18  1645 08/19/18  1110 08/19/18  0617 08/18/18  0624 08/17/18  0644   WBC  --  6.0 5.2 5.5 6.5   RBC  --  3.05* 3.13* 3.38* 3.43*   HGB 8.4* 8.9* 8.9* 9.6* 9.8*   HCT  --  27.1* 28.3* 29.9* 30.5*    MCV  --  89 90 89 89   MCH  --  29.2 28.4 28.4 28.6   MCHC  --  32.8 31.4* 32.1 32.1   RDW  --  19.1* 19.5* 19.6* 19.8*   PLT  --  132* 145* 162 141*     INR  No lab results found in last 7 days.

## 2018-08-20 NOTE — PROGRESS NOTES
Colorectal Surgery Progress Note  POD#8    Subjective:  NGT placed 8/18 after episode of emesis. Initial output of 800 ml, has been 580 over 24 hours thereafter. Feels less distended. Some gas in ostomy bag, no liquid.     Vitals:  Vitals:    08/19/18 2043 08/20/18 0200 08/20/18 0545 08/20/18 0802   BP: (!) 162/114 (!) 173/108 (!) 148/113 (!) 168/114   BP Location: Left arm Left arm Left arm Left arm   Pulse:       Resp:  18 18 18   Temp:  98.4  F (36.9  C)  98.5  F (36.9  C)   TempSrc:  Axillary  Oral   SpO2:  99%  100%   Weight:       Height:           I/O:  I/O last 3 completed shifts:  In: 2099.58 [I.V.:1508.94; NG/GT:60]  Out: 630 [Urine:50; Emesis/NG output:580]    Physical Exam:  Gen: AAOx3, NAD  Pulm: Non-labored breathing  Abd: Soft, non-distended, appropriately tender, no guarding   Incision C/D/I with dermabond at low midline incision without erythema or drainage     Stoma pink and viable with gas, but no stool in bag  Ext:  Warm and well-perfused    BMP  Recent Labs  Lab 08/19/18  0617 08/18/18  0624 08/17/18  0644 08/16/18  0638 08/15/18  0638   * 131* 134 135 134   POTASSIUM 4.1 4.6 4.3 5.4* 4.4   CHLORIDE 97 97 100 101 99   CO2 25 22 24 24 25   BUN 34* 48* 28 42* 30   CR 3.65* 5.37* 4.07* 6.48* 5.20*   * 181* 156* 98 79   MAG 1.9 2.0 1.9 2.0 1.9   PHOS 3.1  --  3.9 5.3* 5.0*     CBC  Recent Labs  Lab 08/19/18  1645 08/19/18  1110 08/19/18  0617 08/18/18  0624 08/17/18  0644   WBC  --  6.0 5.2 5.5 6.5   HGB 8.4* 8.9* 8.9* 9.6* 9.8*   HCT  --  27.1* 28.3* 29.9* 30.5*   PLT  --  132* 145* 162 141*       ASSESSMENT: This is a 63 year old male H/O heart TXP 6/2018 on immunosuppressants, ESRD on HD, R internal jugular thrombus on apixban (held, last dose 8/16), now on heparin gtt, T2DM, ongoing psuedomonas bacteremia p/w abd pain and BRBPR w/ first episode diverticulitis cb paracolonic abscess seen on CT. Co-incidental R sided anorectal abscess. 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  8/12 s/p EUA w/ perirectal abscess I/D    Post-op course notable for ileus requiring NGT decompression     - PRN dilaudid  - NPO while NGT in place, continue TPN  - KUB 8/20, if tube in place and continues to make low output ~500, may pull NGT  - PO meds switch to IV due to concern for poor absorption in setting of ileus  - Replete lytes to Mg >2, K >4, Phos >3  - Continue heparin gtt, please monitor Hgb once at therapeutic levels of heparin, may resume home apixaban once no evidence of bleeding, and within 24 hours of therapeutic heparin  - Completed 48 hours of post-op ABX, defer to primary / ID for duration of therapy  - PO vanc for C.diff per primary  - WOCN teaching      Bird Fairchild MD  General Surgery PGY-1  Colon and Rectal Surgery Service  [phone]    Patient was seen with team and discussed with staff

## 2018-08-20 NOTE — PROGRESS NOTES
HEMODIALYSIS TREATMENT NOTE    Date: 8/20/2018  Time: 12:20 PM    Data:  Pre Wt: 80 kg (176 lb 5.9 oz)   Desired Wt: 77 kg   Post Wt: 77 kg (169 lb 12.1 oz)  Weight gain: -3 kg   Weight change: 3 kg  Ultrafiltration - Post Run Net Total Removed (mL): 3000 mL  Ultrafiltration - Post Run Net Total Gain (mL): 0 mL  Vascular Access Status: Yes, secured and visible  Dialyzer Rinse: Clear  Total Blood Volume Processed: 67.6  Total Dialysis (Treatment) Time:  3 hours    Lab:   No    Interventions:  Stable HD tx over 3 hours, , net UF 3L as ordered.  VSS throughout (tachycardic, hypertensive baseline).  CVC dressing changed, tego caps changed and NS locked.  HD consent resigned d/t missing from chart, put in consent section of chart and sent back to pt room via transport.  Heat packs applied to abdomen for on-going discomfort.  Did not want medication.  Hand off given to floor RN.    Assessment:  Calm and cooperative with cares.  CVC patent and functioning well.  Slept majority of tx.  Tolerated HD well.     Plan:    Per renal team.

## 2018-08-20 NOTE — PLAN OF CARE
Problem: Patient Care Overview  Goal: Plan of Care/Patient Progress Review  Outcome: No Change  Pt noted to be hypertensive throughout day (see flow sheet for VS). Evening BP remains elevated 158/107 (130). Pt asymptomatic. Evening Hydralazine given. MD notified of BPs. No new orders at this time. Continue to monitor. Notify MD of any changes/concerns.

## 2018-08-20 NOTE — PLAN OF CARE
Problem: Patient Care Overview  Goal: Plan of Care/Patient Progress Review  Patient at dialysis in AM and with multiple providers upon check ins x2 in PM.

## 2018-08-20 NOTE — PLAN OF CARE
Problem: Patient Care Overview  Goal: Plan of Care/Patient Progress Review  Patient s/p colostomy 8/14. History of heart transplant (6/14/18), DM II and ESRD on HD. VSS, pain controlled with warm packs. Patient refused scheduled tylenol. HD run this AM with 3L removed, tolerated well. LR and TPN infusing per orders. NPO except meds/sips of water. NG to gravity, can be removed tomorrow pending output. Orders to flush NG q4h. Calcium gluconate and magnesium (1.7) given per one time order. Stool noted from colostomy bag. Minimal activity this shift. Continue to assess and monitor, notify Cards 2 with concerns.

## 2018-08-20 NOTE — PROGRESS NOTES
[facility]  Transplant Infectious Disease Progress Note     Patient:  Murray Nicholson, Date of birth 1955, Medical record number [id number]  Date of Visit:  08/20/2018         Assessment and Recommendations:   Recommendations:  - Continue IV linezolid, cefepime, and flagyl at current dosing for treatment of polymicrobial abdominal infection  - Continue treatment for C difficile with PO vancomycin. Should complete at least 10-day course but final duration based on plan for other broad-spectrum coverage as he is at risk of recurrence while still on broad therapy.  - Decreased ganciclovir to prophylaxis dosing of 0.625mg/kg three times weekly (MWF).  Should continue prophylactic dosing until 3 months post transplant (9/14/18). This could be changed back to valganciclovr with return of bowel function.  - Continue pentamidine for PJP prophylaxis.  If he has stable counts moving forward, would be reasonable to re-assess bactrim.  - For small pulmonary nodules, we would recommend sooner interval imaging in transplant patient with repeat CT in 4-6 weeks.    Thoughts discussed with cards2 team.  Transplant ID will continue to follow with you.     Assessment:  Mr. Nicholson is a 63-year-old gentleman with history of ischemic/valvular cardiomyopathy s/p OHT on 6/14/18 (induction with solumedrol and maintenance with tacrolimus, MMF, and prednisone), ESRD on TThSa hemodialysis currently listed for kidney transplantation, history of polymicrobial peritonitis with Candida glabrata when previously on peritoneal dialysis in 1/2018, recent Pseudomonas aeruginosa bacteremia on 7/26/18 with retained dialysis catheter, hypertension, hyperlipidemia, and type 2 diabetes who was admitted on 8/12 with subjective fevers, severe lower abdominal pain, and bloody stools and now ow C diff positive.  He is s/p laparoscopic abscess drainage, sigmoidectomy, and partial small bowel resection on 8/14, with polymicrobial  operative cultures growing Citrobacter freundii complex, VRE, Klebsiella pneumoniae, pseudomonas aeruginosa and Veillonella.     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   During his prior admission, he had imaging findings suggestive of early colitis and more recent imaging from 8/12 suggesting progression of this with development of abscesses.  Throughout this time period, he had been on broad-spectrum coverage with cefepime, which should have suppressed many enteric organisms, though with holes in anaerobic coverage, that could explain progression of symptoms. Underlying lesions seems likely to be related to diverticulosis.  Taken to the OR on 8/14/18 for laparoscopic drainage of pericolonic and perirectal abscesses, as well as sigmoidectomy, end colostomy, and partial small bowel resection. Fluid cultures obtained in the OR (8/14/18) have since grown  Citrobacter freundii complex, VRE, Klebsiella pneumoniae, pseudomonas aeruginosa and Veillonella. Patient has remained afebrile in post-op period, with subjective improvement in abdominal pain. Remains appropriate to continue IV cefepime, linezolid and flagyl for treatment of polymicrobial infection while patient is NPO.  His course has been complicated by post-op ileus which has been slow to resolved.  Small bowel enhancement seen on CT likely related to known small bowel resection.      # C diff positive diarrhea:  Commenced therapy with oral vancomycin on 8/13/18.     #  Pseudomonas aeruginosa bacteremia associated with a hemodialysis catheter that was retained: History of positive blood cultures from 7/26 but by report, there have not been more recent positive cultures since then.   There is certainly a risk that he may have recrudescence of bacteremia as result of biofilm formation on the line that was retained.  However, at present, the cefepime would be suppressing growth as he has not been off antibiotics for any period of time.  He has not had evidence of breakthrough bacteremia as assessed by blood cultures from 8/10 as well as more recent blood cultures drawn during this admission.  Because of his intra-abdominal abscesses, we expect that he will continue to be on an agent active against Pseudomonas for some time.     # New small (3mm and 5mm) pulmonary nodules: Would repeat CT chest in 4-6 weeks.    # Oral ulcer: Viral testing during last admission was negative.  Wound culture grew Pseudomonas in addition to oral tiffany. Lesion appears to have decreased in size since earlier in hospitalization.     Other ID issues:  - Viral serostatus & prophylaxis: HSV1-, HSV2+, CMV D+/R-, EBV D?/R+, VZV+.  Ganciclovir while NPO.  - PJP: TMP-SMX  - Isolation status: Enteric for C difficile carriage.     Staffed with Dr. Rahul Blank.     Juliana Alexander MD, PhD  Adult & Pediatric Infectious Diseases Fellow PGY7, [facility]  Pager: [phone]    Attestation:  I have reviewed today's vital signs, medications, labs and imaging.      Floor time: 25 minutes, Face-to-face time: 10 minutes, Total time: 35 minutes  Rahul Blank,   Pager [phone]  Murray Nicholson was seen in the hospital by Rahul Blank on August 20th with Dr. Alexander.  I reviewed the history & exam. Assessment and plan were jointly made.  I agree with and have edited the fellow's note and plan of care.  Rahul Blank MD.          Interval History:   Last seen by ID on 8/17.  S/p Sigmoid colectomy on 3/14.  He remains afebrile.  Continues on HD with minimal urine output.  On 8/18, he had emesis of green-black material so NG was placed.  Improved abdominal pain after decompression.  Tiny amount of stool in ostomy bag just today.   Started on TPN on 8/19.  Dialyzed today due to contrast given for CT.  No cough / sob, mouth pain.  No new skin rashes.     Anti-infectives:  Current:  IV Cefepime 7/26-present  IV metronidazole 8/12-present  PO vancomycin 8/13-present  IV linezolid 8/16-present  Pentamadine 8/17  Ganciclovor induction 8/13-8/20, decreased to ppx dose moving forward    Transplants:  6/14/2018 (Heart), Postoperative day:  67.  Coordinator Britni Mcknight    Immunosuppression: Mycophenolate 500 mg twice daily, tacrolimus 1 mg every morning and 2 mg every afternoon prior trough goal had been 10-12 but currently targeting 8.  Solumedrol 8mg q8h         Current Medications & Allergies:       sodium chloride 0.9%  250 mL Intravenous Once in dialysis     sodium chloride 0.9%  300 mL Hemodialysis Machine Once     acetaminophen  1,000 mg Oral 4x Daily     ceFEPIme (MAXIPIME) IV  1 g Intravenous Q24H     fluticasone  1-2 spray Both Nostrils Daily     ganciclovir (CYTOVENE) intermittent infusion  1.25 mg/kg Intravenous Once per day on Mon Wed Fri     gelatin absorbable  1 each Topical During Hemodialysis (from stock)     hydrALAZINE  50 mg Oral 4x Daily     insulin aspart  1-6 Units Subcutaneous Q4H     linezolid  600 mg Intravenous Q12H     lipids  250 mL Intravenous Once per day on Sun Mon Tue Wed Thu     methylPREDNISolone  8 mg Intravenous Q12H     metroNIDAZOLE  500 mg Intravenous Q8H     mycophenolate  500 mg Intravenous Q12H     - MEDICATION INSTRUCTIONS -   Does not apply Once     nystatin  1,000,000 Units Swish & Swallow 4x Daily     pantoprazole  40 mg Intravenous Q24H     sodium chloride (PF)  3 mL Intracatheter Q8H     sodium chloride (PF)  3 mL Intracatheter Q8H     triamcinolone   Topical BID     vancomycin  125 mg Oral 4x Daily     Infusions/Drips:    IV fluid REPLACEMENT ONLY       IV fluid REPLACEMENT ONLY       HEParin 600 Units/hr (08/20/18 0156)     lactated ringers 40 mL/hr at 08/19/18 1357     - MEDICATION  INSTRUCTIONS -       parenteral nutrition - ADULT compounded formula 40 mL/hr at 08/19/18 0692     Allergies   Allergen Reactions     Norco [Hydrocodone-Acetaminophen] Nausea and Vomiting     Cats      Throat tightness     Isosorbide Other (See Comments)     hypotension     Penicillins Hives     Seasonal Allergies      rhinitis     Shrimp      Throat closes           Review of Systems:   As per Interval History.  Complete ROS is otherwise negative.         Physical Exam:   Vitals were reviewed and are stable.  Vital sign ranges over the past 24 hours:  Temp:  [97.8  F (36.6  C)-98.6  F (37  C)] 97.8  F (36.6  C)  Heart Rate:  [89-96] 89  Resp:  [16-20] 18  BP: (145-173)/(104-126) 166/126  SpO2:  [98 %-100 %] 100 %    Physical Examination:  GENERAL: Pleasant and cooperative, resting in bed. No acute distress.   EYES:  EOMI, anicteric sclerae  ENT:  Oral ulcer on hard palate slowly decreasing in size.  NG tube in place.  LUNGS:  Clear to auscultation bilaterally  CARDIOVASCULAR:  Regular rate and rhythm with no murmur.  Well-healed surgical scars.    ABDOMEN: Very rare bowel sounds.  Improved abdominal tenderness from prior exam.  LLQ colostomy with clean stoma, tiny piece of brown stool.    SKIN:  No acute rash.  NEUROLOGIC:  Alert, oriented x3, good historian.         Laboratory Data:   BUN 21  K3.6    WBC 7.0  ANC 6.6  Hgb 7.9  Plt 157    Pathology   8/14/18:   A. SMALL BOWEL WITH FISTULA, RESECTION:   - Small bowel wall with fistulous tract lined by inflamed granulation   tissue   - Margins viable     B. SIGMOID COLON, SIGMOIDECTOMY:   - Colonic wall with diverticulosis and associated perforations lined by   acute and chronic inflammation,   foreign body giant cell reaction and granulation tissue formation   - Margins viable        Microbiology  Abdominal abscess fluid    8/14 Light growth of citrobacter freundii complex, VRE, Klebsiella pneumoniae, and Pseudomonas aeruginosa. Veillonella, mixed tiffany    Blood                          18 dialysis catheter tip 2 strains CoNS                         7/26/18 x2 from [facility] Pseudomonas aeruginosa (Microbiology report obtained from [facility] and copy placed in paper chart.  Pan-sensitive).                         7/26/18 x2 [facility] negative                         7/28/18 x2 negative                         8/10/18 ×2 NGTD                         18 ×3 NGTD     Wound                         18: Mouth wound with pansensitive pseudomonas aeruginosa and normal tiffany                         18 Mouth ulcer     Peritoneal fluid                         18 13 yellow fluid with 4736 WBCs, 80% neutrophils.  Gram stain with no organisms and many WBCs.  Culture with Candida glabrata and Bacteroides caccae.                         1/15/18: Peritoneal fluid with 4256 WBCs, 91% neutrophils.  Gram stain with many WBCs and no organisms seen.  Culture with Staphylococcus epidermidis, Candida glabrata    Enteric  Enteric LOGAN   18 negative  Giardia ag   18 negative  C diff   18 positive  Cryptosporidium ag   18 pending  Microsporidium ag   18 pending     Virology  CMV blood PCR                         18 negative                         18 negative  EBV blood PCR                         18 negative  Parvovirus blood PCR                          18 negative  VZV blood PCR                         18 negative     Imagin/20 KUB  1. NG/OG tube with its tip and sidehole projecting over the stomach.  2. Stable gaseous distention off the, an loops of large and small bowel bowel.     AXR  1. Enteric tube tip and sidehole project over the proximal stomach.  2. Nonspecific bowel gas pattern with gaseous distention of bowel loops in the central abdomen and right lower quadrant.     CT abd pelvis  1.  Postsurgical changes of partial sigmoidectomy, partial small bowel resection and left lower quadrant colostomy. Small amount of free air  and ascites, potentially postsurgical.  2.  Dilated loops of bowel potentially representing ileus or mild partial obstruction.  3.  1.5 cm area of focal apparent enhancement in the small bowel the left lower quadrant. This is indeterminate, though follow-up is warranted as it may represent a small enhancing mass. Consider outpatient follow-up CT enterography or MR enterography.  4. Small left pleural effusion. Lung base pulmonary nodules again seen, unchanged from 8/12/2026. Consider follow-up imaging in 3 months.

## 2018-08-20 NOTE — PLAN OF CARE
Problem: Patient Care Overview  Goal: Plan of Care/Patient Progress Review    D: Pt admitted with fever/chills, found to have daniella-colonic abscess, daniella-rectal abscess, probable diverticulitis, R marge colitis, and c-diff now s/p laparoscopic assisted sigmoid colectomy with end colostomy 8/14. Hx of heart transplant 6/2018, ESRD on HD T, Th, Sa, RIJ thrombus, and DM2     I/A: Vital signs stable, afebrile, A&Ox4, SR/ST. Continues to be hypertensive. NG to low intermittent suction (see flowsheets for output). Pt continues to endorse abdominal pain, but declining intervention. Ostomy in place with no output, but gas noted this AM. IV antibiotics given as scheduled. TPN and lipids continuing overnight. LR infusing at 40 ml/hr. Heparin gtt infusing at 600 units/hr, 10A recheck at 0800. BG check Q4H with sliding scale coverage. Pt requesting to have cares clustered as much as possible to facilitate sleep. Up with SBA/A1. NPO.      P: Dialysis today. Possible PICC placement for IV infusions. Continue to monitor and notify MD with pertinent changes.

## 2018-08-20 NOTE — PROGRESS NOTES
CLINICAL NUTRITION SERVICES - REASSESSMENT NOTE     Nutrition Prescription    RECOMMENDATIONS FOR MDs/PROVIDERS TO ORDER:  Monitor Lytes closely (K+, Mg++ and Phos since pt is at high risk for refeeding) and replace lytes aggressively.  If PICC placed, consider transitioning pt to TPN (if no ROBF) to meet pt's needs more appropriately (Current PPN does not meet pt's needs)     Malnutrition Status:    Non-severe malnutrition in the context of Acute illness    Recommendations already ordered by Registered Dietitian (RD):  - Continue current PPN - 960 ml/day with 55 gm Dextrose (GIR= 0.5 mg/kg/min) 40 gm AA  with 20% 250 ml lipids x 5 days a week    Future/Additional Recommendations:  If able to feed the gut, consider EN with FT and if EN becomes POC  Recommend TF with Nepro @ goal 50 ml/hr (1200 ml/day) to provide 2160 kcals (29kcal/kg/day), 97 g PRO (1.3 g/kg/day), 876 ml free H2O, 193 g CHO and 15 g Fiber daily. Recommend 30 ml q 4 hours patency flushes. If and when FT placed Start TF at 20 ml and increase rate by 10 ml q 8 hours until goal provided pt's lytes wnl.         EVALUATION OF THE PROGRESS TOWARD GOALS   Diet: NPO  Nutrition Support: Started on PPN on 8/19/18. Discussed with cardiology team and per MD will continue with PPN for now and if Bowel function does not return in the next 1-2 days would transition to TPN (due to concern for infection)  Intake: Unable to assess 2/2 NPO status x 8 days (since 8/12) now. Pt is receiving PPN from 8/19 that does not meet pt's needs adequately.        Current PPN Regimen: 960 ml/day with 55 gm Dextrose (GIR= 0.5 mg/kg/min) 40 gm Aminoacid  with 20% 250 ml lipids x 5 days a week -  ~ 704 kcal/day (9 kcal/kg), 0.5 gm/kg protein, 51% of total kcal from fat.       NEW FINDINGS   Labs:   K+: 4.1 on (8/19) --> 3.6 on (8/20)  Mg++: 1.9 on (8/19) --> 1.7 on (8/20)  (replaced 8/19)  Phos: dropped from 3.1 on (8/19) --> 1.5 on (8/20)     Wt hx:   Per Nephrology Estimated Dry wt:  76 kg    08/20/18 0841 80 kg (176 lb 5.9 oz)   08/19/18 0634 79.3 kg (174 lb 13.2 oz)   08/18/18 0730 79.1 kg (174 lb 6.1 oz)   08/16/18 0953 79.1 kg (174 lb 6.1 oz)   08/14/18 0500 78.8 kg (173 lb 11.6 oz)   08/12/18 0228 75 kg (165 lb 5.5 oz)   No wt loss noted since last RD assessment    Medications:   Solumedrol injection, Protonix. Flagyl, Florastor      MALNUTRITION  % Intake: </= 50% for >/= 5 days (severe)  % Weight Loss: None noted - ? Fluids, on HD   Subcutaneous Fat Loss: Facial region:  mild  Muscle Loss: Temporal, Scapular bone and Thoracic region (clavicle, acromium bone, deltoid, trapezius, pectoral): mild  Fluid Accumulation/Edema: None noted  Malnutrition Diagnosis: Non-severe malnutrition in the context of Acute illness    Previous Goals   Total avg nutritional intake to meet a minimum of 25 kcal/kg and 1.2 g PRO/kg daily (per dosing wt 75 kg).  Evaluation: Not met    Previous Nutrition Diagnosis  Inadequate protein-energy intake related to lack of appetite, abdominal pain as evidenced by little oral intake for the past four days.   Evaluation: Declining    CURRENT NUTRITION DIAGNOSIS  Inadequate parenteral nutrition infusion related to pt without central line access for TPN (due to concerns of infection/endocardiditis) with recent hx of heart transplant on 6/26/2018 and now with post op ileus s/p colectomy as evidenced by pt NPO x 8 days, now on PPN that is meeting ~ 38% of kcal needs (9.4 kcal/kg) and 44% of protein needs (0.5 gm/kg).       INTERVENTIONS  Implementation  Collaboration with other providers - Discussed with cardiothoracic team MD and  Pharm D with Cardiology 2 regarding the status of pt's nutrition with PPN Vs TPN. Currently per MD, pt ot continue on PPN for next couple of days if no bowel function then would consider TPN. Discussed PPN formulation with Pharm D and given pt showing signs of refeeding with drop in Phosphorus, recommend continuing current recipe for PPN macronutrient  and aggressively replacing lytes.     Goals  Total avg nutritional intake to meet a minimum of 25 kcal/kg and 1.2 g PRO/kg daily (per dosing wt 75 kg).    Monitoring/Evaluation  Progress toward goals will be monitored and evaluated per protocol.    Cydney Ryan RD LD  Weekday pager - [phone]   Weekend pager - [phone]

## 2018-08-21 ENCOUNTER — APPOINTMENT (OUTPATIENT)
Dept: OCCUPATIONAL THERAPY | Facility: CLINIC | Age: 63
DRG: 329 | End: 2018-08-21
Payer: MEDICARE

## 2018-08-21 LAB
ANION GAP SERPL CALCULATED.3IONS-SCNC: 10 MMOL/L (ref 3–14)
BASOPHILS # BLD AUTO: 0 10E9/L (ref 0–0.2)
BASOPHILS # BLD AUTO: 0 10E9/L (ref 0–0.2)
BASOPHILS NFR BLD AUTO: 0 %
BASOPHILS NFR BLD AUTO: 0 %
BUN SERPL-MCNC: 45 MG/DL (ref 7–30)
CALCIUM SERPL-MCNC: 7.8 MG/DL (ref 8.5–10.1)
CHLORIDE SERPL-SCNC: 94 MMOL/L (ref 94–109)
CO2 SERPL-SCNC: 23 MMOL/L (ref 20–32)
CREAT SERPL-MCNC: 3.66 MG/DL (ref 0.66–1.25)
DIFFERENTIAL METHOD BLD: ABNORMAL
DIFFERENTIAL METHOD BLD: ABNORMAL
EOSINOPHIL # BLD AUTO: 0 10E9/L (ref 0–0.7)
EOSINOPHIL # BLD AUTO: 0 10E9/L (ref 0–0.7)
EOSINOPHIL NFR BLD AUTO: 0 %
EOSINOPHIL NFR BLD AUTO: 0.3 %
ERYTHROCYTE [DISTWIDTH] IN BLOOD BY AUTOMATED COUNT: 18.3 % (ref 10–15)
ERYTHROCYTE [DISTWIDTH] IN BLOOD BY AUTOMATED COUNT: 18.4 % (ref 10–15)
GFR SERPL CREATININE-BSD FRML MDRD: 17 ML/MIN/1.7M2
GLUCOSE BLDC GLUCOMTR-MCNC: 178 MG/DL (ref 70–99)
GLUCOSE BLDC GLUCOMTR-MCNC: 209 MG/DL (ref 70–99)
GLUCOSE BLDC GLUCOMTR-MCNC: 251 MG/DL (ref 70–99)
GLUCOSE BLDC GLUCOMTR-MCNC: 254 MG/DL (ref 70–99)
GLUCOSE BLDC GLUCOMTR-MCNC: 263 MG/DL (ref 70–99)
GLUCOSE BLDC GLUCOMTR-MCNC: 269 MG/DL (ref 70–99)
GLUCOSE BLDC GLUCOMTR-MCNC: 303 MG/DL (ref 70–99)
GLUCOSE SERPL-MCNC: 294 MG/DL (ref 70–99)
HCT VFR BLD AUTO: 20.3 % (ref 40–53)
HCT VFR BLD AUTO: 22.3 % (ref 40–53)
HGB BLD-MCNC: 6.9 G/DL (ref 13.3–17.7)
HGB BLD-MCNC: 7.4 G/DL (ref 13.3–17.7)
HGB BLD-MCNC: 7.7 G/DL (ref 13.3–17.7)
IMM GRANULOCYTES # BLD: 0.1 10E9/L (ref 0–0.4)
IMM GRANULOCYTES # BLD: 0.1 10E9/L (ref 0–0.4)
IMM GRANULOCYTES NFR BLD: 1.3 %
IMM GRANULOCYTES NFR BLD: 1.5 %
LMWH PPP CHRO-ACNC: 0.14 IU/ML
LMWH PPP CHRO-ACNC: 0.27 IU/ML
LYMPHOCYTES # BLD AUTO: 0.3 10E9/L (ref 0.8–5.3)
LYMPHOCYTES # BLD AUTO: 0.6 10E9/L (ref 0.8–5.3)
LYMPHOCYTES NFR BLD AUTO: 4.6 %
LYMPHOCYTES NFR BLD AUTO: 9.9 %
MAGNESIUM SERPL-MCNC: 2.2 MG/DL (ref 1.6–2.3)
MCH RBC QN AUTO: 29 PG (ref 26.5–33)
MCH RBC QN AUTO: 29.5 PG (ref 26.5–33)
MCHC RBC AUTO-ENTMCNC: 33.2 G/DL (ref 31.5–36.5)
MCHC RBC AUTO-ENTMCNC: 34 G/DL (ref 31.5–36.5)
MCV RBC AUTO: 87 FL (ref 78–100)
MCV RBC AUTO: 88 FL (ref 78–100)
MONOCYTES # BLD AUTO: 0.2 10E9/L (ref 0–1.3)
MONOCYTES # BLD AUTO: 0.2 10E9/L (ref 0–1.3)
MONOCYTES NFR BLD AUTO: 3 %
MONOCYTES NFR BLD AUTO: 3.9 %
NEUTROPHILS # BLD AUTO: 5 10E9/L (ref 1.6–8.3)
NEUTROPHILS # BLD AUTO: 5.6 10E9/L (ref 1.6–8.3)
NEUTROPHILS NFR BLD AUTO: 84.4 %
NEUTROPHILS NFR BLD AUTO: 91.1 %
NRBC # BLD AUTO: 0 10*3/UL
NRBC # BLD AUTO: 0.1 10*3/UL
NRBC BLD AUTO-RTO: 0 /100
NRBC BLD AUTO-RTO: 1 /100
PHOSPHATE SERPL-MCNC: 2.9 MG/DL (ref 2.5–4.5)
PLATELET # BLD AUTO: 118 10E9/L (ref 150–450)
PLATELET # BLD AUTO: 136 10E9/L (ref 150–450)
POTASSIUM SERPL-SCNC: 3.8 MMOL/L (ref 3.4–5.3)
RBC # BLD AUTO: 2.34 10E12/L (ref 4.4–5.9)
RBC # BLD AUTO: 2.55 10E12/L (ref 4.4–5.9)
SODIUM SERPL-SCNC: 128 MMOL/L (ref 133–144)
TACROLIMUS BLD-MCNC: 4 UG/L (ref 5–15)
TME LAST DOSE: ABNORMAL H
WBC # BLD AUTO: 6 10E9/L (ref 4–11)
WBC # BLD AUTO: 6.1 10E9/L (ref 4–11)

## 2018-08-21 PROCEDURE — 25000132 ZZH RX MED GY IP 250 OP 250 PS 637: Mod: GY | Performed by: STUDENT IN AN ORGANIZED HEALTH CARE EDUCATION/TRAINING PROGRAM

## 2018-08-21 PROCEDURE — 85018 HEMOGLOBIN: CPT | Performed by: STUDENT IN AN ORGANIZED HEALTH CARE EDUCATION/TRAINING PROGRAM

## 2018-08-21 PROCEDURE — A9270 NON-COVERED ITEM OR SERVICE: HCPCS | Mod: GY | Performed by: COLON & RECTAL SURGERY

## 2018-08-21 PROCEDURE — 86923 COMPATIBILITY TEST ELECTRIC: CPT | Performed by: STUDENT IN AN ORGANIZED HEALTH CARE EDUCATION/TRAINING PROGRAM

## 2018-08-21 PROCEDURE — 25000128 H RX IP 250 OP 636: Performed by: STUDENT IN AN ORGANIZED HEALTH CARE EDUCATION/TRAINING PROGRAM

## 2018-08-21 PROCEDURE — 86901 BLOOD TYPING SEROLOGIC RH(D): CPT | Performed by: STUDENT IN AN ORGANIZED HEALTH CARE EDUCATION/TRAINING PROGRAM

## 2018-08-21 PROCEDURE — 85025 COMPLETE CBC W/AUTO DIFF WBC: CPT | Performed by: COLON & RECTAL SURGERY

## 2018-08-21 PROCEDURE — 40000556 ZZH STATISTIC PERIPHERAL IV START W US GUIDANCE

## 2018-08-21 PROCEDURE — 86900 BLOOD TYPING SEROLOGIC ABO: CPT | Performed by: STUDENT IN AN ORGANIZED HEALTH CARE EDUCATION/TRAINING PROGRAM

## 2018-08-21 PROCEDURE — A9270 NON-COVERED ITEM OR SERVICE: HCPCS | Mod: GY

## 2018-08-21 PROCEDURE — 83735 ASSAY OF MAGNESIUM: CPT | Performed by: COLON & RECTAL SURGERY

## 2018-08-21 PROCEDURE — 25000128 H RX IP 250 OP 636

## 2018-08-21 PROCEDURE — 25000125 ZZHC RX 250: Performed by: COLON & RECTAL SURGERY

## 2018-08-21 PROCEDURE — 40000133 ZZH STATISTIC OT WARD VISIT

## 2018-08-21 PROCEDURE — 86850 RBC ANTIBODY SCREEN: CPT | Performed by: STUDENT IN AN ORGANIZED HEALTH CARE EDUCATION/TRAINING PROGRAM

## 2018-08-21 PROCEDURE — 80048 BASIC METABOLIC PNL TOTAL CA: CPT | Performed by: COLON & RECTAL SURGERY

## 2018-08-21 PROCEDURE — 84100 ASSAY OF PHOSPHORUS: CPT | Performed by: COLON & RECTAL SURGERY

## 2018-08-21 PROCEDURE — A9270 NON-COVERED ITEM OR SERVICE: HCPCS | Mod: GY | Performed by: INTERNAL MEDICINE

## 2018-08-21 PROCEDURE — 85520 HEPARIN ASSAY: CPT | Performed by: INTERNAL MEDICINE

## 2018-08-21 PROCEDURE — 99233 SBSQ HOSP IP/OBS HIGH 50: CPT | Mod: GC | Performed by: INTERNAL MEDICINE

## 2018-08-21 PROCEDURE — 85025 COMPLETE CBC W/AUTO DIFF WBC: CPT

## 2018-08-21 PROCEDURE — 25000125 ZZHC RX 250: Performed by: INTERNAL MEDICINE

## 2018-08-21 PROCEDURE — 97535 SELF CARE MNGMENT TRAINING: CPT | Mod: GO

## 2018-08-21 PROCEDURE — 36415 COLL VENOUS BLD VENIPUNCTURE: CPT

## 2018-08-21 PROCEDURE — 25000132 ZZH RX MED GY IP 250 OP 250 PS 637: Mod: GY | Performed by: INTERNAL MEDICINE

## 2018-08-21 PROCEDURE — 25000128 H RX IP 250 OP 636: Performed by: INTERNAL MEDICINE

## 2018-08-21 PROCEDURE — 25000132 ZZH RX MED GY IP 250 OP 250 PS 637: Mod: GY | Performed by: COLON & RECTAL SURGERY

## 2018-08-21 PROCEDURE — 90937 HEMODIALYSIS REPEATED EVAL: CPT

## 2018-08-21 PROCEDURE — 25000125 ZZHC RX 250: Performed by: STUDENT IN AN ORGANIZED HEALTH CARE EDUCATION/TRAINING PROGRAM

## 2018-08-21 PROCEDURE — 80197 ASSAY OF TACROLIMUS: CPT | Performed by: STUDENT IN AN ORGANIZED HEALTH CARE EDUCATION/TRAINING PROGRAM

## 2018-08-21 PROCEDURE — 21400003 ZZH R&B CCU CRITICAL UMMC

## 2018-08-21 PROCEDURE — 36415 COLL VENOUS BLD VENIPUNCTURE: CPT | Performed by: COLON & RECTAL SURGERY

## 2018-08-21 PROCEDURE — 00000146 ZZHCL STATISTIC GLUCOSE BY METER IP

## 2018-08-21 PROCEDURE — 63400005 ZZH RX 634: Performed by: INTERNAL MEDICINE

## 2018-08-21 PROCEDURE — 25000132 ZZH RX MED GY IP 250 OP 250 PS 637: Mod: GY

## 2018-08-21 PROCEDURE — 85520 HEPARIN ASSAY: CPT | Performed by: COLON & RECTAL SURGERY

## 2018-08-21 RX ORDER — HEPARIN SODIUM 1000 [USP'U]/ML
500 INJECTION, SOLUTION INTRAVENOUS; SUBCUTANEOUS
Status: DISCONTINUED | OUTPATIENT
Start: 2018-08-21 | End: 2018-08-21

## 2018-08-21 RX ORDER — HEPARIN SODIUM 1000 [USP'U]/ML
500 INJECTION, SOLUTION INTRAVENOUS; SUBCUTANEOUS CONTINUOUS
Status: DISCONTINUED | OUTPATIENT
Start: 2018-08-21 | End: 2018-08-21

## 2018-08-21 RX ORDER — SODIUM PHOSPHATE,MONOBASIC
4 GRANULES (GRAM) MISCELLANEOUS ONCE
Status: COMPLETED | OUTPATIENT
Start: 2018-08-21 | End: 2018-08-21

## 2018-08-21 RX ORDER — DEXTROSE MONOHYDRATE 50 MG/ML
INJECTION, SOLUTION INTRAVENOUS
Status: COMPLETED
Start: 2018-08-21 | End: 2018-08-21

## 2018-08-21 RX ADMIN — INSULIN ASPART 3 UNITS: 100 INJECTION, SOLUTION INTRAVENOUS; SUBCUTANEOUS at 02:30

## 2018-08-21 RX ADMIN — INSULIN ASPART 4 UNITS: 100 INJECTION, SOLUTION INTRAVENOUS; SUBCUTANEOUS at 05:18

## 2018-08-21 RX ADMIN — I.V. FAT EMULSION 250 ML: 20 EMULSION INTRAVENOUS at 19:57

## 2018-08-21 RX ADMIN — METHYLPREDNISOLONE SODIUM SUCCINATE 8 MG: 40 INJECTION, POWDER, LYOPHILIZED, FOR SOLUTION INTRAMUSCULAR; INTRAVENOUS at 09:06

## 2018-08-21 RX ADMIN — Medication 250 MG: at 09:13

## 2018-08-21 RX ADMIN — DEXTROSE MONOHYDRATE 1000 ML: 50 INJECTION, SOLUTION INTRAVENOUS at 18:23

## 2018-08-21 RX ADMIN — PANTOPRAZOLE SODIUM 40 MG: 40 INJECTION, POWDER, FOR SOLUTION INTRAVENOUS at 09:06

## 2018-08-21 RX ADMIN — EPOETIN ALFA 7600 UNITS: 10000 SOLUTION INTRAVENOUS; SUBCUTANEOUS at 15:15

## 2018-08-21 RX ADMIN — HYDRALAZINE HYDROCHLORIDE 75 MG: 50 TABLET ORAL at 22:04

## 2018-08-21 RX ADMIN — HYDRALAZINE HYDROCHLORIDE 75 MG: 50 TABLET ORAL at 18:08

## 2018-08-21 RX ADMIN — SODIUM CHLORIDE 250 ML: 9 INJECTION, SOLUTION INTRAVENOUS at 13:28

## 2018-08-21 RX ADMIN — VANCOMYCIN HYDROCHLORIDE 125 MG: KIT at 09:12

## 2018-08-21 RX ADMIN — SODIUM CHLORIDE 300 ML: 9 INJECTION, SOLUTION INTRAVENOUS at 13:28

## 2018-08-21 RX ADMIN — INSULIN ASPART 3 UNITS: 100 INJECTION, SOLUTION INTRAVENOUS; SUBCUTANEOUS at 22:05

## 2018-08-21 RX ADMIN — SODIUM CHLORIDE: 234 INJECTION INTRAMUSCULAR; INTRAVENOUS; SUBCUTANEOUS at 19:56

## 2018-08-21 RX ADMIN — INSULIN ASPART 3 UNITS: 100 INJECTION, SOLUTION INTRAVENOUS; SUBCUTANEOUS at 10:35

## 2018-08-21 RX ADMIN — Medication 25 MG: at 22:04

## 2018-08-21 RX ADMIN — METRONIDAZOLE 500 MG: 500 INJECTION, SOLUTION INTRAVENOUS at 22:05

## 2018-08-21 RX ADMIN — Medication 4 MG%: at 15:04

## 2018-08-21 RX ADMIN — NYSTATIN 1000000 UNITS: 100000 SUSPENSION ORAL at 18:07

## 2018-08-21 RX ADMIN — MYCOPHENOLATE MOFETIL 500 MG: 500 INJECTION, POWDER, LYOPHILIZED, FOR SOLUTION INTRAVENOUS at 20:14

## 2018-08-21 RX ADMIN — LINEZOLID 600 MG: 600 INJECTION, SOLUTION INTRAVENOUS at 02:20

## 2018-08-21 RX ADMIN — METRONIDAZOLE 500 MG: 500 INJECTION, SOLUTION INTRAVENOUS at 03:54

## 2018-08-21 RX ADMIN — HEPARIN SODIUM 3000 UNITS: 1000 INJECTION, SOLUTION INTRAVENOUS; SUBCUTANEOUS at 16:19

## 2018-08-21 RX ADMIN — HYDRALAZINE HYDROCHLORIDE 75 MG: 50 TABLET ORAL at 09:12

## 2018-08-21 RX ADMIN — LINEZOLID 600 MG: 600 INJECTION, SOLUTION INTRAVENOUS at 18:07

## 2018-08-21 RX ADMIN — INSULIN ASPART 1 UNITS: 100 INJECTION, SOLUTION INTRAVENOUS; SUBCUTANEOUS at 18:09

## 2018-08-21 RX ADMIN — VANCOMYCIN HYDROCHLORIDE 125 MG: KIT at 18:08

## 2018-08-21 RX ADMIN — Medication 250 MG: at 20:10

## 2018-08-21 RX ADMIN — METHYLPREDNISOLONE SODIUM SUCCINATE 8 MG: 40 INJECTION, POWDER, LYOPHILIZED, FOR SOLUTION INTRAMUSCULAR; INTRAVENOUS at 20:10

## 2018-08-21 RX ADMIN — MYCOPHENOLATE MOFETIL 500 MG: 500 INJECTION, POWDER, LYOPHILIZED, FOR SOLUTION INTRAVENOUS at 09:39

## 2018-08-21 RX ADMIN — VANCOMYCIN HYDROCHLORIDE 125 MG: KIT at 22:04

## 2018-08-21 RX ADMIN — NYSTATIN 1000000 UNITS: 100000 SUSPENSION ORAL at 09:29

## 2018-08-21 RX ADMIN — TACROLIMUS 14 MCG/HR: 5 INJECTION, SOLUTION INTRAVENOUS at 18:23

## 2018-08-21 RX ADMIN — NYSTATIN 1000000 UNITS: 100000 SUSPENSION ORAL at 22:04

## 2018-08-21 ASSESSMENT — ACTIVITIES OF DAILY LIVING (ADL)
ADLS_ACUITY_SCORE: 12
ADLS_ACUITY_SCORE: 12
ADLS_ACUITY_SCORE: 11
ADLS_ACUITY_SCORE: 12
ADLS_ACUITY_SCORE: 11

## 2018-08-21 ASSESSMENT — PAIN DESCRIPTION - DESCRIPTORS: DESCRIPTORS: ACHING

## 2018-08-21 NOTE — PROGRESS NOTES
"  WO Nurse Inpatient [facility]   WO Nurse Inpatient Adult     Follow Up Assessment   Assessment of new end Colostomy Stoma complication(s) none   Mucocutaneous junction; intact   Peristomal complication(s) none   Pouch wear time:3-4 days,   Following today's visit:Patient /   is  able to demonstrate;       1. How to empty their pouch? yes      2. How to change their pouch?  Yes-needs practce        Objective data:  Patient history according to medical record:63 year old male- POD6 (8/14/18) s/p laparoscopic assisted sigmoid colectomy with end colostomy- POD #5 s/p perirectal abscess I&D. Pt has h/o heart TXP 6/2018 on immunosuppressants, ESRD on HD, R IJ thrombus on apixabam (held, last dose 2d ago), T2DM, ongoing pseudomonal bacteremia p/w abd pain & BRBPR w/first episode of diverticulitis c/b paracolonic abscess seen on CT  Current Diet/Nutrition:   Active Diet Order      NPO for Medical/Clinical Reasons Except for: Meds, Ice Chips   TPN no   I/O last 3 completed shifts:  In: 2403.58 [P.O.:290; I.V.:1502.94; NG/GT:80]  Out: 3555 [Urine:125; Emesis/NG output:430; Other:3000]  Labs:    Recent Labs  Lab 08/20/18  1643 08/20/18  1105  08/17/18  0644   ALBUMIN  --  1.7*  --   --    HGB 7.9* 9.1*  < > 9.8*   INR  --  0.99  --   --    WBC  --  7.0  < > 6.5   CRP  --   --   --  270.0*   < > = values in this interval not displayed.     Physical Exam:  Current pouching system:Corinth 2 piece system placed 4 days ago with minimal melting  Reason for pouch change today: ostomy education  Stoma appearance: pink-red, oval and moist  Stoma size; 1 1/8\" x 1 5/8\" with adequate protrusion  Peristomal skin: not visualized (barrier in place)  Stoma output : scant amt of semi formed brown fecal material  Abdominal  Assessment  firm , NG still in place? Yes, clamped  Surgical Site: open to air  Pain: Sharp and Gnawing  Is patient still on a PCA No    Interventions:  Patient's chart evaluated.  Focus of today's visit: " refitting of appliance, pouch change return demonstration and verbal instruction    Participant of teaching session today patient   Orders: Reviewed  Change made with ostomy management today: Yes  Patient/family: active participation and performed with verbal cues  Supplies:at bedside    Plan:  Learning needs: discharge instructions  Preparation for discharge: No discharge preparations started  Recommend home care? yes    Discussed plan of care with Patient  Nursing to notify the Provider(s) and re-consult the WOC Nurse if new ostomy concerns or discharge planned before next planned WOC visit.    WOC Nurse will return: Wed  Face to face time: 45 minutes

## 2018-08-21 NOTE — PLAN OF CARE
Problem: Patient Care Overview  Goal: Plan of Care/Patient Progress Review  PT 6C: Cancel - pt with another provider in AM, at dialysis in afternoon; will reschedule to 8/22.

## 2018-08-21 NOTE — PROGRESS NOTES
Colorectal Surgery Progress Note  POD#9    Subjective:  Denies nausea, Minimal NG output over past 24 hours. Abdomen soft. Some gas in ostomy bag with small amount of stool.     Vitals:  Vitals:    08/20/18 1945 08/20/18 2300 08/21/18 0300 08/21/18 0731   BP: (!) 155/104 (!) 167/105 (!) 153/109 (!) 155/111   BP Location: Left arm Left arm Left arm Left arm   Pulse:       Resp: 18 18 18 18   Temp: 98.5  F (36.9  C) 97.9  F (36.6  C) 98.6  F (37  C) 98.6  F (37  C)   TempSrc: Oral Oral Oral Oral   SpO2: 100% 100% 99% 100%   Weight:       Height:           I/O:  I/O last 3 completed shifts:  In: 3181.29 [P.O.:290; I.V.:1643.07; NG/GT:75]  Out: 3175 [Urine:175; Other:3000]    Physical Exam:  Gen: AAOx3, NAD  Pulm: Non-labored breathing  Abd: Soft, non-distended, minimally tender   Incision C/D/I with dermabond at low midline incision without erythema or drainage     Stoma pink and viable with gas and stool in bag  Ext:  Warm and well-perfused    BMP    Recent Labs  Lab 08/20/18  1105 08/19/18  0617 08/18/18  0624 08/17/18  0644 08/16/18  0638    132* 131* 134 135   POTASSIUM 3.6 4.1 4.6 4.3 5.4*   CHLORIDE 98 97 97 100 101   CO2 26 25 22 24 24   BUN 21 34* 48* 28 42*   CR 2.20* 3.65* 5.37* 4.07* 6.48*   * 207* 181* 156* 98   MAG 1.7 1.9 2.0 1.9 2.0   PHOS 1.5* 3.1  --  3.9 5.3*     CBC    Recent Labs  Lab 08/21/18  0633 08/20/18  1643 08/20/18  1105 08/19/18  1645 08/19/18  1110 08/19/18  0617   WBC 6.1  --  7.0  --  6.0 5.2   HGB 7.4* 7.9* 9.1* 8.4* 8.9* 8.9*   HCT 22.3*  --  27.9*  --  27.1* 28.3*   *  --  157  --  132* 145*       ASSESSMENT: This is a 63 year old male H/O heart TXP 6/2018 on immunosuppressants, ESRD on HD, R internal jugular thrombus on apixban (held, last dose 8/16), now on heparin gtt, T2DM, ongoing psuedomonas bacteremia p/w abd pain and BRBPR w/ first episode diverticulitis cb paracolonic abscess seen on CT. Co-incidental R sided anorectal abscess. Path confirmed  diverticulitis with associated perforations     8/14 s/p lap sigmoidectomy s/p end colostomy and small bowel resection, 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  8/12 s/p EUA w/ perirectal abscess I/D    Post-op course notable for ileus requiring NGT decompression     - PRN dilaudid  - May remove NG and start sips of clears. Ok for sips of Boost.  - PO meds switch to IV due to concern for poor absorption in setting of ileus  - Replete lytes to Mg >2, K >4, Phos >3  - Continue heparin gtt, please monitor Hgb once at therapeutic levels of heparin, may resume home apixaban on discharge.   - Completed 48 hours of post-op ABX, defer to primary / ID for duration of therapy  - PO vanc for C.diff per primary  - WOCN teaching    Julita Robledo MD   Colon and Rectal Surgery Fellow  Pager: [phone]  8/21/2018 at 7:58 AM

## 2018-08-21 NOTE — PLAN OF CARE
Problem: Patient Care Overview  Goal: Plan of Care/Patient Progress Review  OT/6C - Discharge Planner OT   Patient plan for discharge: home  Current status:  Educated on LB dressing and bathing techniques for increased IND and compliance with abd precautions. Pt demos ability to utilize compensatory figure four method to don B socks. Facilitated standing/seated ADL at sink with min A for sponge bath to wash back. Fatigue limiting standing to ~5 min before sitting required. /83, HR  bpm.  Barriers to return to prior living situation: abdominal precautions, activity tolerance, fatigue  Recommendations for discharge: TCU  Rationale for recommendations: Pt would benefit from continued skilled OT and PT to address needs prior to return home.       Entered by: Gita Shelley 08/21/2018 10:45 AM

## 2018-08-21 NOTE — PROGRESS NOTES
[facility]  Transplant Infectious Disease Progress Note     Patient:  Murray Nicholson, Date of birth 1955, Medical record number [id number]  Date of Visit:  08/21/2018         Assessment and Recommendations:   Recommendations:  - Continue IV linezolid, cefepime, and metronidazole at current dosing for treatment of polymicrobial abdominal infection while waiting for gut function to improve.  - Once able to reliably tolerate PO, could transition to ciprofloxacin 500 mg PO daily (HD dosing), Linezolid 600mg PO BID and metronidazole 500mg PO TID.  Would plan to complete 2 week course from last OR (8/14-8/27)  - Continue treatment for C difficile with PO vancomycin for 10 days following cessation of broad-spectrum coverage as he is at risk of recurrence while still on broad therapy.  - Continue ganciclovir at prophylaxis dosing of 0.625mg/kg three times weekly (MWF).  Should continue prophylactic dosing until 3 months post transplant (9/14/18). This could be changed back to valganciclovr with return of bowel function.  - Continue pentamidine for PJP prophylaxis.  If he has stable counts moving forward, would be reasonable to re-assess bactrim.  - For small pulmonary nodules, we would recommend sooner interval imaging in transplant patient with repeat CT in 4-6 weeks.    Thoughts discussed with cards2 team.  Transplant ID will continue to follow with you.     Assessment:  Mr. Nicholson is a 63-year-old gentleman with history of ischemic/valvular cardiomyopathy s/p OHT on 6/14/18 (induction with solumedrol and maintenance with tacrolimus, MMF, and prednisone), ESRD on TThSa hemodialysis currently listed for kidney transplantation, history of polymicrobial peritonitis with Candida glabrata when previously on peritoneal dialysis in 1/2018, recent Pseudomonas aeruginosa bacteremia on 7/26/18 with retained dialysis catheter, hypertension, hyperlipidemia, and type 2 diabetes who was admitted on 8/12  with subjective fevers, severe lower abdominal pain, and bloody stools and now ow C diff positive.  He is s/p laparoscopic abscess drainage, sigmoidectomy, and partial small bowel resection on 8/14, with polymicrobial operative cultures growing Citrobacter freundii complex, VRE, Klebsiella pneumoniae, pseudomonas aeruginosa and Veillonella.     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   During his prior admission, he had imaging findings suggestive of early colitis and more recent imaging from 8/12 suggesting progression of this with development of abscesses.  Throughout this time period, he had been on broad-spectrum coverage with cefepime, which should have suppressed many enteric organisms, though with holes in anaerobic coverage, that could explain progression of symptoms. Underlying lesions seems likely to be related to diverticulosis.  Taken to the OR on 8/14/18 for laparoscopic drainage of pericolonic and perirectal abscesses, as well as sigmoidectomy, end colostomy, and partial small bowel resection. Fluid cultures obtained in the OR (8/14/18) have since grown  Citrobacter freundii complex, VRE, Klebsiella pneumoniae, pseudomonas aeruginosa and Veillonella. Patient has remained afebrile in post-op period, with subjective improvement in abdominal pain. Remains appropriate to continue IV cefepime, linezolid and flagyl for treatment of polymicrobial infection while patient is NPO.  His course has been complicated by post-op ileus which has been slow to resolved.  Small bowel enhancement seen on CT likely related to known small bowel resection.      # C diff positive diarrhea:  Commenced therapy with oral vancomycin on 8/13/18.     #   Pseudomonas aeruginosa bacteremia associated with a hemodialysis catheter that was retained: History of positive blood cultures from 7/26 but by report, there have not been more recent positive cultures since then.  There is certainly a risk that he may have recrudescence of bacteremia as result of biofilm formation on the line that was retained.  However, at present, the cefepime would be suppressing growth as he has not been off antibiotics for any period of time.  He has not had evidence of breakthrough bacteremia as assessed by blood cultures from 8/10 as well as more recent blood cultures drawn during this admission.  Because of his intra-abdominal abscesses, we expect that he will continue to be on an agent active against Pseudomonas for some time.     # New small (3mm and 5mm) pulmonary nodules: Would repeat CT chest in 4-6 weeks.    # Oral ulcer: Viral testing during last admission was negative.  Wound culture grew Pseudomonas in addition to oral tiffany. Lesion appears to have decreased in size since earlier in hospitalization.     Other ID issues:  - Viral serostatus & prophylaxis: HSV1-, HSV2+, CMV D+/R-, EBV D?/R+, VZV+.  Ganciclovir until tolerating PO.  - PJP: TMP-SMX  - Isolation status: Enteric for C difficile.     Staffed with Dr. Rahul Blank.     Juliana Alexander MD, PhD  Adult & Pediatric Infectious Diseases Fellow PGY7, [facility]  Pager: [phone]    Attestation:  I have reviewed today's vital signs, medications, labs and imaging.      Floor time: 25 minutes, Face-to-face time: 10 minutes, Total time: 35 minutes  Rahul Blank,   Pager [phone]  Murray Nicholson was seen in the hospital by Rahul Blank on August 21 with Dr. Alexander.  I reviewed the history & exam. Assessment and plan were jointly made.  I agree with and have edited the fellow's note and plan of care.  Rahul Blank MD.          Interval History:   Last seen by ID on 8/20  S/p Sigmoid colectomy on 8/14.  He remains  afebrile.  Continues on HD with minimal urine output.  NG removed and he has started to try clears.  Increasing stool output from ostomy.  Off IV dilaudid and trying to minimize opiate to help with bowel function Continues on TPN support.  Dialyzed again today.  No cough or shortness of breath.  Stable on room air.  Oral ulcer continues to improve.  No rashes.    Anti-infectives:  Current:  IV Cefepime 7/26-present  IV metronidazole 8/12-present  PO vancomycin 8/13-present  IV linezolid 8/16-present  Pentamadine 8/17  Ganciclovor induction 8/13-8/20, decreased to ppx dose on 8/22    Transplants:  6/14/2018 (Heart), Postoperative day:  68.  Coordinator Britni Mcknight    Immunosuppression: Mycophenolate 500 mg twice daily, tacrolimus 1 mg every morning and 2 mg every afternoon prior trough goal had been 10-12 but currently targeting 8.  Solumedrol 8mg q8h         Current Medications & Allergies:       acetaminophen  1,000 mg Oral 4x Daily     ceFEPIme (MAXIPIME) IV  1 g Intravenous Q24H     D5W         fluticasone  1-2 spray Both Nostrils Daily     [START ON 8/22/2018] ganciclovir (CYTOVENE) intermittent infusion  0.625 mg/kg (Ideal) Intravenous Once per day on Mon Wed Fri     hydrALAZINE  75 mg Oral 4x Daily     insulin aspart  1-6 Units Subcutaneous Q4H     linezolid  600 mg Intravenous Q12H     lipids  250 mL Intravenous Once per day on Sun Mon Tue Wed Thu     methylPREDNISolone  8 mg Intravenous Q12H     metoclopramide  5 mg Intravenous Once     metroNIDAZOLE  500 mg Intravenous Q8H     mycophenolate  500 mg Intravenous Q12H     nystatin  1,000,000 Units Swish & Swallow 4x Daily     pantoprazole  40 mg Intravenous Q24H     saccharomyces boulardii  250 mg Oral BID     sodium chloride (PF)  3 mL Intracatheter Q8H     sodium chloride (PF)  3 mL Intracatheter Q8H     traMADol  25 mg Oral Once     triamcinolone   Topical BID     vancomycin  125 mg Oral 4x Daily     Infusions/Drips:    IV fluid REPLACEMENT  ONLY       IV fluid REPLACEMENT ONLY       HEParin 600 Units/hr (08/21/18 0908)     parenteral nutrition - ADULT compounded formula       parenteral nutrition - ADULT compounded formula 40 mL/hr at 08/21/18 0800     tacrolimus (Prograf) infusion ADULT       Allergies   Allergen Reactions     Norco [Hydrocodone-Acetaminophen] Nausea and Vomiting     Cats      Throat tightness     Isosorbide Other (See Comments)     hypotension     Penicillins Hives     Seasonal Allergies      rhinitis     Shrimp      Throat closes           Review of Systems:   As per Interval History.  Complete ROS is otherwise negative.         Physical Exam:   Vitals were reviewed and are stable.  Vital sign ranges over the past 24 hours:  Temp:  [97.9  F (36.6  C)-98.6  F (37  C)] 98.4  F (36.9  C)  Heart Rate:  [] 94  Resp:  [18-20] 20  BP: ()/() 119/86  Cuff Mean (mmHg):  [] 105  SpO2:  [99 %-100 %] 100 %    Physical Examination:  GENERAL: Pleasant and cooperative, resting in bed. No acute distress.   EYES:  EOMI, anicteric sclerae  ENT:  Oral ulcer on hard palate, improving.  Interval removal of NG.  LUNGS:  Clear to auscultation bilaterally  CARDIOVASCULAR:  Regular rate and rhythm with no murmur.  Well-healed surgical scars.    ABDOMEN: Increased bowel sounds.  Less tender.   LLQ colostomy with clean stoma, stool in bag..    SKIN:  No acute rash.  NEUROLOGIC:  Alert, oriented x3, good historian.         Laboratory Data:   Na 128  K 3.8  BUN 45    WBC 6.1  ANC 5.6  Hgb 7.7  Plt 136    Pathology   8/14/18:   A. SMALL BOWEL WITH FISTULA, RESECTION:   - Small bowel wall with fistulous tract lined by inflamed granulation   tissue   - Margins viable     B. SIGMOID COLON, SIGMOIDECTOMY:   - Colonic wall with diverticulosis and associated perforations lined by   acute and chronic inflammation,   foreign body giant cell reaction and granulation tissue formation   - Margins viable        Microbiology  Abdominal abscess fluid      Light growth of citrobacter freundii complex, VRE, Klebsiella pneumoniae, and Pseudomonas aeruginosa. Veillonella, mixed tiffany    Blood                         18 dialysis catheter tip 2 strains CoNS                         7/26/18 x2 from [facility] Pseudomonas aeruginosa (Microbiology report obtained from [facility] and copy placed in paper chart.  Pan-sensitive).                         7/26/18 x2 [facility] negative                         7/28/18 x2 negative                         8/10/18 ×2 negative                         18 ×3 negative     Wound                         18: Mouth wound with pansensitive pseudomonas aeruginosa and normal tiffany                         18 Mouth ulcer     Peritoneal fluid                         18 13 yellow fluid with 4736 WBCs, 80% neutrophils.  Gram stain with no organisms and many WBCs.  Culture with Candida glabrata and Bacteroides caccae.                         1/15/18: Peritoneal fluid with 4256 WBCs, 91% neutrophils.  Gram stain with many WBCs and no organisms seen.  Culture with Staphylococcus epidermidis, Candida glabrata    Enteric  Enteric LOGAN   18 negative  Giardia ag   18 negative  C diff   18 positive  Cryptosporidium ag   18 pending  Microsporidium ag   18 pending     Virology  CMV blood PCR                         18 negative                         18 negative  EBV blood PCR                         18 negative  Parvovirus blood PCR                          18 negative  VZV blood PCR                         18 negative     Imagin/20 KUB  1. NG/OG tube with its tip and sidehole projecting over the stomach.  2. Stable gaseous distention off the, an loops of large and small bowel bowel.     AXR  1. Enteric tube tip and sidehole project over the proximal stomach.  2. Nonspecific bowel gas pattern with gaseous distention of bowel loops in the central abdomen and right lower quadrant.      CT abd pelvis  1.  Postsurgical changes of partial sigmoidectomy, partial small bowel resection and left lower quadrant colostomy. Small amount of free air and ascites, potentially postsurgical.  2.  Dilated loops of bowel potentially representing ileus or mild partial obstruction.  3.  1.5 cm area of focal apparent enhancement in the small bowel the left lower quadrant. This is indeterminate, though follow-up is warranted as it may represent a small enhancing mass. Consider outpatient follow-up CT enterography or MR enterography.  4. Small left pleural effusion. Lung base pulmonary nodules again seen, unchanged from 8/12/2026. Consider follow-up imaging in 3 months.

## 2018-08-21 NOTE — PROGRESS NOTES
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        ASSESSMENT AND RECOMMENDATIONS:       ESRD - Etiology of ESRD 2/2 HTN, DM, CRS.   Has chronic OP HD at [facility], TTS schedule, under care of Dr Mcpherson. Right TDC, EDW 76 kg    - Will continue TTS schedule        Volume status -  EDW 76.0 kg. No edema/dyspnea. -150/. Is anuric.      - Continue pre run weights, standing   - Strict I/O  - 3 kg UF today      HTN - stable       Transplant IS regimen: Tac, MMF, Pred. TAC 8.4      BMD - Ca 7.8, alb 1.7, Phos 2.9    - Vit D/PTH being managed in OP HD unit      Anemia - Hgb 7.4. On Epo 7600 q run.        Pericolonic abscess, small perircal abscess 2.7 cm.- Underwent I/D anorectal abscess on 8/13 and  Sigmoidectomy/end colostomy on 8/14. Also has C diff, on PO vanc. Antimicrobials per primary. CT suggestive of an ileus and NG tube was placed, now resolved and NG tube out      Lung nodules - New Right lower lobe pulmonary nodules seen on CT this admission. ID recommending close f/u.         Recommendations were communicated to primary team via progress note          Interval History :   Seen on dialysis, stable run to dry weight. Feeling weak but improving. Denies n/v, CP, SOB, chills.      Review of Systems:   4 point ROS negative other than as noted above.    Physical Exam:   I/O last 3 completed shifts:  In: 3181.29 [P.O.:290; I.V.:1643.07; NG/GT:75]  Out: 3175 [Urine:175; Other:3000]   BP (!) 149/105  Pulse 101  Temp 98  F (36.7  C) (Oral)  Resp 18  Ht 1.753 m (5' 9\")  Wt 80.5 kg (177 lb 7.5 oz)  SpO2 100%  BMI 26.21 " kg/m2     GENERAL APPEARANCE: alert, NAD  EYES: no scleral icterus, pupils equal  Pulmonary: lungs CTA.   CV: RRR   - Edema - none  GI: soft, appropriately tender. Colostomy/stoma   MS: no evidence of inflammation in joints, no muscle tenderness  SKIN: no rash, warm, dry, no cyanosis  NEURO: alert/oriented    ACCESS: Right TDC    Labs:   All labs reviewed by me  Electrolytes/Renal -   Recent Labs   Lab Test  08/21/18   0633  08/20/18   1105  08/19/18   0617   NA  128*  134  132*   POTASSIUM  3.8  3.6  4.1   CHLORIDE  94  98  97   CO2  23  26  25   BUN  45*  21  34*   CR  3.66*  2.20*  3.65*   GLC  294*  209*  207*   TRINI  7.8*  8.8  7.9*   MAG  2.2  1.7  1.9   PHOS  2.9  1.5*  3.1       CBC -   Recent Labs   Lab Test  08/21/18   0633  08/20/18   1643  08/20/18   1105   08/19/18   1110   WBC  6.1   --   7.0   --   6.0   HGB  7.4*  7.9*  9.1*   < >  8.9*   PLT  136*   --   157   --   132*    < > = values in this interval not displayed.       LFTs -   Recent Labs   Lab Test  08/20/18   1105  08/14/18   0620  08/13/18   0618   ALKPHOS  170*  110  99   BILITOTAL  0.4  0.3  0.3   ALT  18  17  17   AST  41  29  28   PROTTOTAL  6.1*  5.3*  5.2*   ALBUMIN  1.7*  1.6*  1.6*       Iron Panel -   Recent Labs   Lab Test  07/10/18   0711  05/08/18   0954  07/19/17   1306   IRON  66  58  46   IRONSAT  28  27  18   ALBERTINA  771*  621*  369       Current Medications:    acetaminophen  1,000 mg Oral 4x Daily     ceFEPIme (MAXIPIME) IV  1 g Intravenous Q24H     epoetin emily (EPOGEN,PROCRIT) inj ESRD  7,600 Units Intravenous Once in dialysis     fluticasone  1-2 spray Both Nostrils Daily     [START ON 8/22/2018] ganciclovir (CYTOVENE) intermittent infusion  0.625 mg/kg (Ideal) Intravenous Once per day on Mon Wed Fri     gelatin absorbable  1 each Topical During Hemodialysis (from stock)     heparin (porcine)  500 Units Hemodialysis Machine Once in dialysis     heparin  3 mL Intracatheter During Hemodialysis (from stock)     heparin  3 mL  Intracatheter During Hemodialysis (from stock)     hydrALAZINE  75 mg Oral 4x Daily     insulin aspart  1-6 Units Subcutaneous Q4H     linezolid  600 mg Intravenous Q12H     lipids  250 mL Intravenous Once per day on Sun Mon Tue Wed Thu     methylPREDNISolone  8 mg Intravenous Q12H     metoclopramide  5 mg Intravenous Once     metroNIDAZOLE  500 mg Intravenous Q8H     mycophenolate  500 mg Intravenous Q12H     nystatin  1,000,000 Units Swish & Swallow 4x Daily     pantoprazole  40 mg Intravenous Q24H     saccharomyces boulardii  250 mg Oral BID     sodium chloride (PF)  3 mL Intracatheter During Hemodialysis (from stock)     sodium chloride (PF)  3 mL Intracatheter During Hemodialysis (from stock)     sodium chloride (PF)  3 mL Intracatheter Q8H     sodium chloride (PF)  3 mL Intracatheter Q8H     sodium phosphate monobasic  4 mg% Hemodialysis Machine Once     triamcinolone   Topical BID     vancomycin  125 mg Oral 4x Daily       IV fluid REPLACEMENT ONLY       IV fluid REPLACEMENT ONLY       HEParin 600 Units/hr (08/21/18 0908)     heparin (porcine)       parenteral nutrition - ADULT compounded formula 40 mL/hr at 08/21/18 0800     tacrolimus (Prograf) infusion ADULT       Kristi Armas PA-C

## 2018-08-21 NOTE — PROGRESS NOTES
Cardiology 2 Progress Note           Assessment and Plan:   Murray Nicholson is a 63 year old male with a h/o NICM s/p heart txp (6/14/18), ESRD on HD, R internal jugular thrombus on apixaban and DM2 p/w 3-4 bright red BM and fevers/chills, found to have a daniella-colonic abscess (7.8 cm), daniella-rectal abscess (2.7 cm), probable diverticulitis, and R colon colitis.  Planned for daniella-rectal abscess drainage tonight, likely will require colectomy later this week pending discussion between colorectal and IR.    Changes today:  - POD#7 for laparoscopic assisted sigmoid colectomy with end colostomy  - NG tube out, starting clear sips today  - Continued PPN  - Continuing cefepime, flagyl, linezolid, PO vanc, ganciclovir  - Tacro level 7.5 yesterday, starting gtt today, target level 7-8  - Follow up tac level tomorrow  - Hypertensive, increased hydralazine back to home dose 75mg QID  - Plan is for RHC/bx Thursday   - Trending HGB decreased from 9.1 to 7.9 to 7.4 this am, no overt signs of bleeding on exam    # Daniella-colonic abscess (7.8 cm) s/p laparoscopic assisted sigmoid colectomy with end colostomy 8/14) positive for VRE  # Daniella-rectal abscess (2.7 cm) s/p drainage 8/12  # R colon colitis  # Positive c. Diff  Small bowel wall with fistulous tract lined by inflamed granulation tissue, colonic wall with diverticulosis and associated perforations lined by acute and chronic inflammation, foreign body giant cell reaction and granulation tissue formation.  CT abdomen/pelvis notable for dilated loops of bowel.  Also notable for 1.5 cm area of focal apparent enhancement in the small bowel in the LLQ.  Not clear if significant, have asked colorectal to review, will follow up.  NG in, had 580 recorded yesterday, 100 this AM.  On heparin gtt, had provoked R internal jugular line-associated clot last admission.  On cefepime, flagyl, PO vanc, ganciclovir (dose decreased 8/20 with plan to change to valgancyclovir once bowel function has more  fully returned).  - Colorectal, transplant ID following, appreciate assistance  - PRN morphine for pain  - Heparin gtt, Hgb Q12h    # S/p heart transplant 6/13/2018 (donor 3 vessel CAD  HSV1-, HSV1+, CMV D+/R-, EBV D?/R+, VZV+  tacro goal 8 due to infx, last heart bx 7/5/2018 was 0R  8/10/2018 bx results still pending)   - Lowered prednisone to 10/10 this admit  - Continue MMF  - Continue statin  - Received pentamadine 8/17  - Continue nystatin swish  - RHC biopsy from 8/10 without rejection    # Recent pseudomonas bacteremia  # Necrotic mouth ulcer, resolving  Had profound neutropenia and pseudomonas bacteremia several weeks ago in associated with necrotic mouth ulcer; neutropenia likely immunosuppression induced.  Neutropenia resolved last admission and pseudomonal bacteremia is likely cleared.  Necrotic ulcer appears to be resolving from prior admission.  Not currently active issues but will continue to monitor.    # Pulmonary nodules  Incidental on CT, will need interval follow up in 4-6 weeks.    # Recent R internal jugular thrombus  - Holding apixaban in setting of recent surgery  - heparin gtt    # ESRD on TTS HD  - Nephrology consulted, appreciate assistance    # DM2 - SSI    FEN: NPO  PPx: Heparin gtt  Dispo: PT/OT to see when appropriate    Juan Mckay MD  Internal Medicine PGY-1  [phone]    Patient discussed with Dr. Ernst.       Subjective:   Overnight no acute events.  Feeling better.  Had some belly pain that got better with gas output via ostomy.      No chest pain, nausea, vomiting, syncope, dizziness, shortness of breath.            Medications:       acetaminophen  1,000 mg Oral 4x Daily     ceFEPIme (MAXIPIME) IV  1 g Intravenous Q24H     epoetin emily (EPOGEN,PROCRIT) inj ESRD  7,600 Units Intravenous Once in dialysis     fluticasone  1-2 spray Both Nostrils Daily     [START ON 8/22/2018] ganciclovir (CYTOVENE) intermittent infusion  0.625 mg/kg (Ideal) Intravenous Once per day on  Mon Wed Fri     gelatin absorbable  1 each Topical During Hemodialysis (from stock)     heparin (porcine)  500 Units Hemodialysis Machine Once in dialysis     heparin  3 mL Intracatheter During Hemodialysis (from stock)     heparin  3 mL Intracatheter During Hemodialysis (from stock)     hydrALAZINE  75 mg Oral 4x Daily     insulin aspart  1-6 Units Subcutaneous Q4H     linezolid  600 mg Intravenous Q12H     lipids  250 mL Intravenous Once per day on Sun Mon Tue Wed Thu     methylPREDNISolone  8 mg Intravenous Q12H     metoclopramide  5 mg Intravenous Once     metroNIDAZOLE  500 mg Intravenous Q8H     mycophenolate  500 mg Intravenous Q12H     nystatin  1,000,000 Units Swish & Swallow 4x Daily     pantoprazole  40 mg Intravenous Q24H     saccharomyces boulardii  250 mg Oral BID     sodium chloride (PF)  3 mL Intracatheter During Hemodialysis (from stock)     sodium chloride (PF)  3 mL Intracatheter During Hemodialysis (from stock)     sodium chloride (PF)  3 mL Intracatheter Q8H     sodium chloride (PF)  3 mL Intracatheter Q8H     sodium phosphate monobasic  4 mg% Hemodialysis Machine Once     triamcinolone   Topical BID     vancomycin  125 mg Oral 4x Daily       IV fluid REPLACEMENT ONLY       IV fluid REPLACEMENT ONLY       HEParin 600 Units/hr (08/21/18 0908)     heparin (porcine)       parenteral nutrition - ADULT compounded formula 40 mL/hr at 08/21/18 0800     tacrolimus (Prograf) infusion ADULT                 Objective:          Physical Exam:   Temp:  [97.9  F (36.6  C)-98.8  F (37.1  C)] 98  F (36.7  C)  Heart Rate:  [] 89  Resp:  [18] 18  BP: (125-170)/() 149/105  Cuff Mean (mmHg):  [117-124] 124  SpO2:  [99 %-100 %] 100 %  I/O last 3 completed shifts:  In: 3181.29 [P.O.:290; I.V.:1643.07; NG/GT:75]  Out: 3175 [Urine:175; Other:3000]    Vitals:    08/16/18 0953 08/18/18 0730 08/19/18 0634 08/20/18 0841   Weight: 79.1 kg (174 lb 6.1 oz) 79.1 kg (174 lb 6.1 oz) 79.3 kg (174 lb 13.2 oz) 80 kg  (176 lb 5.9 oz)    08/21/18 1014   Weight: 80.5 kg (177 lb 7.5 oz)     GEN: Laying in bed, NAD  HEENT: Mouth ulcer unchanged from prior exams  PULM: CTAB from anterior exam  CV: Regular rate and rhythm.  JVP not elevated   ABD: Colostomy site without surrounding redness.  Formed small brown stool.  Incision C/D/I.  NBS.  Mild diffuse tenderness to palpation in lower quadrants  EXT: Trace lower extremity edema         Data:   BMP    Recent Labs  Lab 08/21/18  0633 08/20/18  1105 08/19/18  0617 08/18/18  0624   * 134 132* 131*   POTASSIUM 3.8 3.6 4.1 4.6   CHLORIDE 94 98 97 97   TRINI 7.8* 8.8 7.9* 8.0*   CO2 23 26 25 22   BUN 45* 21 34* 48*   CR 3.66* 2.20* 3.65* 5.37*   * 209* 207* 181*     LFTs    Recent Labs  Lab 08/20/18  1105   ALKPHOS 170*   AST 41   ALT 18   BILITOTAL 0.4   PROTTOTAL 6.1*   ALBUMIN 1.7*      CBC    Recent Labs  Lab 08/21/18  0633  08/20/18  1105  08/19/18  1110 08/19/18  0617   WBC 6.1  --  7.0  --  6.0 5.2   RBC 2.55*  --  3.19*  --  3.05* 3.13*   HGB 7.4*  < > 9.1*  < > 8.9* 8.9*   HCT 22.3*  --  27.9*  --  27.1* 28.3*   MCV 88  --  88  --  89 90   MCH 29.0  --  28.5  --  29.2 28.4   MCHC 33.2  --  32.6  --  32.8 31.4*   RDW 18.4*  --  18.6*  --  19.1* 19.5*   *  --  157  --  132* 145*   < > = values in this interval not displayed.  INR    Recent Labs  Lab 08/20/18  1105   INR 0.99

## 2018-08-21 NOTE — PLAN OF CARE
Problem: Surgery Nonspecified (Adult)  Goal: Signs and Symptoms of Listed Potential Problems Will be Absent, Minimized or Managed (Surgery Nonspecified)  Signs and symptoms of listed potential problems will be absent, minimized or managed by discharge/transition of care (reference Surgery Nonspecified (Adult) CPG).   Outcome: No Change  2000-2300  D/NG to gravity with minimal drainage. Continues on TPN and IL and several ATB. BP remains elevated with scheduled BP meds.given. Heparin drip continues . He tells me they may pull his NG tomorrow  P/monitor for changes

## 2018-08-21 NOTE — PROGRESS NOTES
HEMODIALYSIS TREATMENT NOTE    Date: 8/21/2018  Time: 5:03 PM    Data:  Pre Wt: 80.5 kg (177 lb 7.5 oz)   Desired Wt: 77.5 kg   Post Wt: 78 kg (171 lb 15.3 oz)  Weight gain: -2.5 kg   Weight change: 2.5 kg  Ultrafiltration - Post Run Net Total Removed (mL): 2500 mL  Ultrafiltration - Post Run Net Total Gain (mL): 0 mL  Vascular Access Status: Yes, secured and visible  Dialyzer Rinse: Streaked, Moderate  Total Blood Volume Processed: 54.7 L  Total Dialysis (Treatment) Time: 2 hrs 47 mins     Lab:   YES     Assessment:  Patent Rt CVC.  Heparin IV pump at 600 units/hr. Pre run BP: 153/101 mmHg.  Hold anti- hypertensive meds before HD.    Interventions:  Initiated HD with UF goal 3.0 kg off. Use 4 % sodium phosphate bath and K 3/3 bath on. Rechecked B/S: 209 at 14:52. Draw heparin 10 A at 3pm. Not tolerable for 3 kg off for soft BP. 13 mins early finished HD and matched UF goal to  2.5 off. d/t soft BP.  After rinse back , CVC locked with 1: 1000 units heparin. (Vol. Specific)       Plan:    Next run per renal team.

## 2018-08-21 NOTE — PLAN OF CARE
Problem: Patient Care Overview  Goal: Plan of Care/Patient Progress Review  Outcome: Improving    D: admitted 8/12 s/p abscess drainage, sigmoidectomy, small bowel resection, and colostomy placement d/t abdominal infection, hx ICM s/p OHT 6/2018, ESRD on HD, HTN, HLD, DM2     I/A:   Neuro- A&Ox4, Ax1, slept well between cares  CV- SR 90s, hypertensive 150-170/100s, denies CP  Pulm- sating well on RA, denies SOB  GI/- NPO except meds w/ NG to gravity w/ minimal drainage, flushing q4h, oliguric on HD, stoma pink, stool 8/20 from colostomy, hypoactive BS  LDA- colostomy, 3 PIVs, NG tube                         -Heparin @ 450 Units/hr (10A recheck this AM)                         -TPN @ 40 mL/hr                         -Lipids @ 20.8 mL/hr                         -LR @ 40 mL/hr                         -intermittent abx  Skin- abdominal incision CDI  Pain- abdominal pain, heat packs applied, declining acetaminophen      P:  Had dialysis 8/20 w/ 3 L removed. NG tube pulled this AM. Continue to monitor and notify CARDS 2 with any changes or concerns.

## 2018-08-21 NOTE — PROGRESS NOTES
Nephrology  Progress note- dialysis visit  08/20/2018     This patient was seen and examined while on dialysis.  Professional oversight of the patient's dialysis care, access care and dialysis related co-morbidities were addressed as necessary with the patient and / or staff.   No issues on run    Laboratory results and nurses' notes were reviewed.   No changes to management of volume, anemia, BMD, acidosis, or electrolytes.   Diagnosis - ESRD      Ana Luisa Mcpherson MD  [facility]  Department of Medicine  Division of Renal Disease and Hypertension  [phone]

## 2018-08-21 NOTE — PLAN OF CARE
Problem: Surgery Nonspecified (Adult)  Goal: Signs and Symptoms of Listed Potential Problems Will be Absent, Minimized or Managed (Surgery Nonspecified)  Signs and symptoms of listed potential problems will be absent, minimized or managed by discharge/transition of care (reference Surgery Nonspecified (Adult) CPG).   Outcome: Improving  D/I: Monitor shows SR/ST 90-110s. Continues on PPN at 40; Lipids finished at 1000; Heparin drip at 600 units/hour per protocol; LR discontinued. On multiple antibiotics for bowel infection and c-diff. Emptied colostomy for 100 ml of liquid dark brown stool. (500 ml out at 0600). NO pulled at end of night shift. Able to take clear liquids but refusing d/t no appetite. Able to take pills with water. C/O abdominal pain but declining tylenol, stating that it doesn't work. Order for tramadol received while in dialysis but wants to wait until he gets back to the unit. Stood at sink with therapy but declined to sit in chair, stating that he was too tired. Understands the importance of getting up to improve bowel function and increase activity tolerance. Went to dialysis at 1230. No visitors present today.  See flowsheets for assessments and additional data.  A: Improving post-op colostomy, now with stool. Continued pain and activity intolerance.   P: Continue Heparin and PPN as ordered. Encourage increased activity and participation in cares. Encourage PO clear liquid intake. Continue current cares and notify providers with questions or concerns.    08/21/18 1530   Surgery Nonspecified   Problems Assessed (Surgery) all   Problems Present (Surgery) infection;pain;situational response

## 2018-08-22 ENCOUNTER — APPOINTMENT (OUTPATIENT)
Dept: PHYSICAL THERAPY | Facility: CLINIC | Age: 63
DRG: 329 | End: 2018-08-22
Payer: MEDICARE

## 2018-08-22 ENCOUNTER — APPOINTMENT (OUTPATIENT)
Dept: OCCUPATIONAL THERAPY | Facility: CLINIC | Age: 63
DRG: 329 | End: 2018-08-22
Payer: MEDICARE

## 2018-08-22 LAB
ANION GAP SERPL CALCULATED.3IONS-SCNC: 10 MMOL/L (ref 3–14)
BASOPHILS # BLD AUTO: 0 10E9/L (ref 0–0.2)
BASOPHILS NFR BLD AUTO: 0 %
BLD PROD TYP BPU: NORMAL
BLD UNIT ID BPU: 0
BLOOD PRODUCT CODE: NORMAL
BPU ID: NORMAL
BUN SERPL-MCNC: 46 MG/DL (ref 7–30)
CALCIUM SERPL-MCNC: 7.3 MG/DL (ref 8.5–10.1)
CHLORIDE SERPL-SCNC: 93 MMOL/L (ref 94–109)
CO2 SERPL-SCNC: 24 MMOL/L (ref 20–32)
CREAT SERPL-MCNC: 3.22 MG/DL (ref 0.66–1.25)
DIFFERENTIAL METHOD BLD: ABNORMAL
EOSINOPHIL # BLD AUTO: 0 10E9/L (ref 0–0.7)
EOSINOPHIL NFR BLD AUTO: 0.1 %
ERYTHROCYTE [DISTWIDTH] IN BLOOD BY AUTOMATED COUNT: 17.9 % (ref 10–15)
GFR SERPL CREATININE-BSD FRML MDRD: 20 ML/MIN/1.7M2
GLUCOSE BLDC GLUCOMTR-MCNC: 241 MG/DL (ref 70–99)
GLUCOSE BLDC GLUCOMTR-MCNC: 248 MG/DL (ref 70–99)
GLUCOSE BLDC GLUCOMTR-MCNC: 249 MG/DL (ref 70–99)
GLUCOSE BLDC GLUCOMTR-MCNC: 265 MG/DL (ref 70–99)
GLUCOSE BLDC GLUCOMTR-MCNC: 278 MG/DL (ref 70–99)
GLUCOSE BLDC GLUCOMTR-MCNC: 281 MG/DL (ref 70–99)
GLUCOSE BLDC GLUCOMTR-MCNC: 348 MG/DL (ref 70–99)
GLUCOSE SERPL-MCNC: 256 MG/DL (ref 70–99)
HCT VFR BLD AUTO: 23.5 % (ref 40–53)
HGB BLD-MCNC: 7.7 G/DL (ref 13.3–17.7)
HGB BLD-MCNC: 7.9 G/DL (ref 13.3–17.7)
IMM GRANULOCYTES # BLD: 0.1 10E9/L (ref 0–0.4)
IMM GRANULOCYTES NFR BLD: 1.1 %
LMWH PPP CHRO-ACNC: <0.1 IU/ML
LYMPHOCYTES # BLD AUTO: 0.3 10E9/L (ref 0.8–5.3)
LYMPHOCYTES NFR BLD AUTO: 4.3 %
MAGNESIUM SERPL-MCNC: 1.8 MG/DL (ref 1.6–2.3)
MCH RBC QN AUTO: 30 PG (ref 26.5–33)
MCHC RBC AUTO-ENTMCNC: 33.6 G/DL (ref 31.5–36.5)
MCV RBC AUTO: 89 FL (ref 78–100)
MONOCYTES # BLD AUTO: 0.2 10E9/L (ref 0–1.3)
MONOCYTES NFR BLD AUTO: 3.2 %
NEUTROPHILS # BLD AUTO: 6.6 10E9/L (ref 1.6–8.3)
NEUTROPHILS NFR BLD AUTO: 91.3 %
NRBC # BLD AUTO: 0.1 10*3/UL
NRBC BLD AUTO-RTO: 1 /100
PHOSPHATE SERPL-MCNC: 2.7 MG/DL (ref 2.5–4.5)
PLATELET # BLD AUTO: 119 10E9/L (ref 150–450)
POTASSIUM SERPL-SCNC: 3.3 MMOL/L (ref 3.4–5.3)
RBC # BLD AUTO: 2.63 10E12/L (ref 4.4–5.9)
SODIUM SERPL-SCNC: 128 MMOL/L (ref 133–144)
TACROLIMUS BLD-MCNC: <3 UG/L (ref 5–15)
TME LAST DOSE: ABNORMAL H
TRANSFUSION STATUS PATIENT QL: NORMAL
TRANSFUSION STATUS PATIENT QL: NORMAL
WBC # BLD AUTO: 7.2 10E9/L (ref 4–11)

## 2018-08-22 PROCEDURE — 85520 HEPARIN ASSAY: CPT | Performed by: INTERNAL MEDICINE

## 2018-08-22 PROCEDURE — G0463 HOSPITAL OUTPT CLINIC VISIT: HCPCS

## 2018-08-22 PROCEDURE — 40000193 ZZH STATISTIC PT WARD VISIT

## 2018-08-22 PROCEDURE — 25000125 ZZHC RX 250: Performed by: INTERNAL MEDICINE

## 2018-08-22 PROCEDURE — 25000125 ZZHC RX 250: Performed by: STUDENT IN AN ORGANIZED HEALTH CARE EDUCATION/TRAINING PROGRAM

## 2018-08-22 PROCEDURE — 25000132 ZZH RX MED GY IP 250 OP 250 PS 637: Mod: GY

## 2018-08-22 PROCEDURE — 25000128 H RX IP 250 OP 636: Performed by: STUDENT IN AN ORGANIZED HEALTH CARE EDUCATION/TRAINING PROGRAM

## 2018-08-22 PROCEDURE — 21400003 ZZH R&B CCU CRITICAL UMMC

## 2018-08-22 PROCEDURE — 00000146 ZZHCL STATISTIC GLUCOSE BY METER IP

## 2018-08-22 PROCEDURE — 25000125 ZZHC RX 250: Performed by: COLON & RECTAL SURGERY

## 2018-08-22 PROCEDURE — A9270 NON-COVERED ITEM OR SERVICE: HCPCS | Mod: GY | Performed by: INTERNAL MEDICINE

## 2018-08-22 PROCEDURE — A9270 NON-COVERED ITEM OR SERVICE: HCPCS | Mod: GY | Performed by: STUDENT IN AN ORGANIZED HEALTH CARE EDUCATION/TRAINING PROGRAM

## 2018-08-22 PROCEDURE — A9270 NON-COVERED ITEM OR SERVICE: HCPCS | Mod: GY | Performed by: COLON & RECTAL SURGERY

## 2018-08-22 PROCEDURE — 25000132 ZZH RX MED GY IP 250 OP 250 PS 637: Mod: GY | Performed by: COLON & RECTAL SURGERY

## 2018-08-22 PROCEDURE — P9016 RBC LEUKOCYTES REDUCED: HCPCS | Performed by: STUDENT IN AN ORGANIZED HEALTH CARE EDUCATION/TRAINING PROGRAM

## 2018-08-22 PROCEDURE — 97530 THERAPEUTIC ACTIVITIES: CPT | Mod: GO

## 2018-08-22 PROCEDURE — 85025 COMPLETE CBC W/AUTO DIFF WBC: CPT | Performed by: COLON & RECTAL SURGERY

## 2018-08-22 PROCEDURE — 36415 COLL VENOUS BLD VENIPUNCTURE: CPT | Performed by: COLON & RECTAL SURGERY

## 2018-08-22 PROCEDURE — 80197 ASSAY OF TACROLIMUS: CPT

## 2018-08-22 PROCEDURE — 97110 THERAPEUTIC EXERCISES: CPT | Mod: GO

## 2018-08-22 PROCEDURE — 85520 HEPARIN ASSAY: CPT | Performed by: COLON & RECTAL SURGERY

## 2018-08-22 PROCEDURE — 97535 SELF CARE MNGMENT TRAINING: CPT | Mod: GO

## 2018-08-22 PROCEDURE — 80048 BASIC METABOLIC PNL TOTAL CA: CPT | Performed by: COLON & RECTAL SURGERY

## 2018-08-22 PROCEDURE — A9270 NON-COVERED ITEM OR SERVICE: HCPCS | Mod: GY

## 2018-08-22 PROCEDURE — 97530 THERAPEUTIC ACTIVITIES: CPT | Mod: GP

## 2018-08-22 PROCEDURE — 85049 AUTOMATED PLATELET COUNT: CPT | Performed by: STUDENT IN AN ORGANIZED HEALTH CARE EDUCATION/TRAINING PROGRAM

## 2018-08-22 PROCEDURE — 40000556 ZZH STATISTIC PERIPHERAL IV START W US GUIDANCE

## 2018-08-22 PROCEDURE — 84100 ASSAY OF PHOSPHORUS: CPT | Performed by: COLON & RECTAL SURGERY

## 2018-08-22 PROCEDURE — 25000132 ZZH RX MED GY IP 250 OP 250 PS 637: Mod: GY | Performed by: INTERNAL MEDICINE

## 2018-08-22 PROCEDURE — 25000131 ZZH RX MED GY IP 250 OP 636 PS 637: Mod: GY | Performed by: STUDENT IN AN ORGANIZED HEALTH CARE EDUCATION/TRAINING PROGRAM

## 2018-08-22 PROCEDURE — 25000132 ZZH RX MED GY IP 250 OP 250 PS 637: Mod: GY | Performed by: STUDENT IN AN ORGANIZED HEALTH CARE EDUCATION/TRAINING PROGRAM

## 2018-08-22 PROCEDURE — 83735 ASSAY OF MAGNESIUM: CPT | Performed by: COLON & RECTAL SURGERY

## 2018-08-22 PROCEDURE — 97110 THERAPEUTIC EXERCISES: CPT | Mod: GP

## 2018-08-22 PROCEDURE — 85018 HEMOGLOBIN: CPT | Performed by: STUDENT IN AN ORGANIZED HEALTH CARE EDUCATION/TRAINING PROGRAM

## 2018-08-22 PROCEDURE — 40000133 ZZH STATISTIC OT WARD VISIT

## 2018-08-22 PROCEDURE — 99233 SBSQ HOSP IP/OBS HIGH 50: CPT | Mod: GC | Performed by: INTERNAL MEDICINE

## 2018-08-22 RX ORDER — POLYETHYLENE GLYCOL 3350 17 G/17G
17 POWDER, FOR SOLUTION ORAL ONCE
Status: DISCONTINUED | OUTPATIENT
Start: 2018-08-22 | End: 2018-08-22

## 2018-08-22 RX ORDER — POLYETHYLENE GLYCOL 3350 17 G/17G
17 POWDER, FOR SOLUTION ORAL ONCE
Status: COMPLETED | OUTPATIENT
Start: 2018-08-22 | End: 2018-08-22

## 2018-08-22 RX ORDER — POTASSIUM CHLORIDE 750 MG/1
20 TABLET, EXTENDED RELEASE ORAL ONCE
Status: COMPLETED | OUTPATIENT
Start: 2018-08-22 | End: 2018-08-22

## 2018-08-22 RX ADMIN — I.V. FAT EMULSION 250 ML: 20 EMULSION INTRAVENOUS at 19:16

## 2018-08-22 RX ADMIN — HYDRALAZINE HYDROCHLORIDE 75 MG: 50 TABLET ORAL at 19:27

## 2018-08-22 RX ADMIN — METRONIDAZOLE 500 MG: 500 INJECTION, SOLUTION INTRAVENOUS at 06:06

## 2018-08-22 RX ADMIN — POTASSIUM CHLORIDE 20 MEQ: 750 TABLET, EXTENDED RELEASE ORAL at 13:08

## 2018-08-22 RX ADMIN — Medication 25 MG: at 13:20

## 2018-08-22 RX ADMIN — INSULIN GLARGINE 10 UNITS: 100 INJECTION, SOLUTION SUBCUTANEOUS at 09:37

## 2018-08-22 RX ADMIN — MYCOPHENOLATE MOFETIL 500 MG: 500 INJECTION, POWDER, LYOPHILIZED, FOR SOLUTION INTRAVENOUS at 20:47

## 2018-08-22 RX ADMIN — INSULIN ASPART 3 UNITS: 100 INJECTION, SOLUTION INTRAVENOUS; SUBCUTANEOUS at 14:43

## 2018-08-22 RX ADMIN — PANTOPRAZOLE SODIUM 40 MG: 40 INJECTION, POWDER, FOR SOLUTION INTRAVENOUS at 11:29

## 2018-08-22 RX ADMIN — METRONIDAZOLE 500 MG: 500 INJECTION, SOLUTION INTRAVENOUS at 21:51

## 2018-08-22 RX ADMIN — VANCOMYCIN HYDROCHLORIDE 125 MG: KIT at 09:36

## 2018-08-22 RX ADMIN — GANCICLOVIR SODIUM 40 MG: 500 INJECTION, POWDER, LYOPHILIZED, FOR SOLUTION INTRAVENOUS at 17:41

## 2018-08-22 RX ADMIN — HYDRALAZINE HYDROCHLORIDE 75 MG: 50 TABLET ORAL at 09:36

## 2018-08-22 RX ADMIN — Medication 25 MG: at 21:51

## 2018-08-22 RX ADMIN — NYSTATIN 1000000 UNITS: 100000 SUSPENSION ORAL at 13:09

## 2018-08-22 RX ADMIN — INSULIN ASPART 3 UNITS: 100 INJECTION, SOLUTION INTRAVENOUS; SUBCUTANEOUS at 06:12

## 2018-08-22 RX ADMIN — INSULIN ASPART 3 UNITS: 100 INJECTION, SOLUTION INTRAVENOUS; SUBCUTANEOUS at 10:00

## 2018-08-22 RX ADMIN — SODIUM CHLORIDE: 234 INJECTION INTRAMUSCULAR; INTRAVENOUS; SUBCUTANEOUS at 19:16

## 2018-08-22 RX ADMIN — METHYLPREDNISOLONE SODIUM SUCCINATE 8 MG: 40 INJECTION, POWDER, LYOPHILIZED, FOR SOLUTION INTRAMUSCULAR; INTRAVENOUS at 11:33

## 2018-08-22 RX ADMIN — MYCOPHENOLATE MOFETIL 500 MG: 500 INJECTION, POWDER, LYOPHILIZED, FOR SOLUTION INTRAVENOUS at 09:35

## 2018-08-22 RX ADMIN — POLYETHYLENE GLYCOL 3350 17 G: 17 POWDER, FOR SOLUTION ORAL at 13:08

## 2018-08-22 RX ADMIN — LINEZOLID 600 MG: 600 INJECTION, SOLUTION INTRAVENOUS at 19:17

## 2018-08-22 RX ADMIN — VANCOMYCIN HYDROCHLORIDE 125 MG: KIT at 13:03

## 2018-08-22 RX ADMIN — VANCOMYCIN HYDROCHLORIDE 125 MG: KIT at 16:54

## 2018-08-22 RX ADMIN — Medication 2 G: at 12:39

## 2018-08-22 RX ADMIN — CEFEPIME HYDROCHLORIDE 1 G: 1 INJECTION, POWDER, FOR SOLUTION INTRAMUSCULAR; INTRAVENOUS at 04:41

## 2018-08-22 RX ADMIN — NYSTATIN 1000000 UNITS: 100000 SUSPENSION ORAL at 16:53

## 2018-08-22 RX ADMIN — HYDRALAZINE HYDROCHLORIDE 75 MG: 50 TABLET ORAL at 13:09

## 2018-08-22 RX ADMIN — INSULIN ASPART 3 UNITS: 100 INJECTION, SOLUTION INTRAVENOUS; SUBCUTANEOUS at 02:36

## 2018-08-22 RX ADMIN — VANCOMYCIN HYDROCHLORIDE 125 MG: KIT at 20:47

## 2018-08-22 RX ADMIN — METRONIDAZOLE 500 MG: 500 INJECTION, SOLUTION INTRAVENOUS at 14:45

## 2018-08-22 RX ADMIN — NYSTATIN 1000000 UNITS: 100000 SUSPENSION ORAL at 09:41

## 2018-08-22 RX ADMIN — LINEZOLID 600 MG: 600 INJECTION, SOLUTION INTRAVENOUS at 07:54

## 2018-08-22 RX ADMIN — NYSTATIN 1000000 UNITS: 100000 SUSPENSION ORAL at 19:27

## 2018-08-22 RX ADMIN — HYDRALAZINE HYDROCHLORIDE 75 MG: 50 TABLET ORAL at 16:54

## 2018-08-22 RX ADMIN — INSULIN ASPART 3 UNITS: 100 INJECTION, SOLUTION INTRAVENOUS; SUBCUTANEOUS at 17:48

## 2018-08-22 RX ADMIN — INSULIN ASPART 5 UNITS: 100 INJECTION, SOLUTION INTRAVENOUS; SUBCUTANEOUS at 21:58

## 2018-08-22 RX ADMIN — METHYLPREDNISOLONE SODIUM SUCCINATE 8 MG: 40 INJECTION, POWDER, LYOPHILIZED, FOR SOLUTION INTRAMUSCULAR; INTRAVENOUS at 19:16

## 2018-08-22 ASSESSMENT — ACTIVITIES OF DAILY LIVING (ADL)
ADLS_ACUITY_SCORE: 13
ADLS_ACUITY_SCORE: 11
ADLS_ACUITY_SCORE: 11

## 2018-08-22 ASSESSMENT — PAIN DESCRIPTION - DESCRIPTORS: DESCRIPTORS: DULL

## 2018-08-22 NOTE — PLAN OF CARE
Problem: Surgery Nonspecified (Adult)  Goal: Signs and Symptoms of Listed Potential Problems Will be Absent, Minimized or Managed (Surgery Nonspecified)  Signs and symptoms of listed potential problems will be absent, minimized or managed by discharge/transition of care (reference Surgery Nonspecified (Adult) CPG).   Outcome: No Change  D/I: Monitor shows SR/ST 90-110s. Continues on PPN at 40, prograf drip at 14 mcg/hour. On multiple antibiotics for bowel infection and c-diff. No output from ostomy since yesterday at 1000. Denies abd bloating or constipation. Abd soft, non-distended. Received mirilax this afternoon. Diet changed to full liquids but declining stating that he has no appetite. Started on [place] counts and drank 1 carton of boost breeze with his mirilax. C/O abdominal pain but declining tylenol. Received tramadol with minimal relief. Stood in room with therapy but declined to sit in chair, stating that he was too tired. Understands the importance of getting up to improve bowel function and increase activity tolerance. No visitors present today.  See flowsheets for assessments and additional data.  A: No stool since yesterday. Continued pain and activity intolerance.   P: Continue IV immunosuppressants per orders. Continue PPN as ordered. Encourage increased activity and participation in cares. Encourage PO full liquid intake. Continue current cares and notify providers with questions or concerns.    08/22/18 1530   Surgery Nonspecified   Problems Assessed (Surgery) all   Problems Present (Surgery) bowel motility decreased;infection;pain;situational response

## 2018-08-22 NOTE — PROGRESS NOTES
Cardiology 2 Progress Note           Assessment and Plan:   Murray Nicholson is a 63 year old male with a h/o NICM s/p heart txp (6/14/18), ESRD on HD, R internal jugular thrombus on apixaban and DM2 p/w 3-4 bright red BM and fevers/chills, found to have a daniella-colonic abscess (7.8 cm), daniella-rectal abscess (2.7 cm), probable diverticulitis, and R colon colitis.    Changes today:  - POD#9 for laparoscopic assisted sigmoid colectomy with end colostomy  - Holding heparin gtt given transfusion needed overnight  - Continuing cefepime, flagyl, linezolid, PO vanc, ganciclovir  - Tacro gtt, target level 7-8  - Follow up tac level tomorrow  - Plan is for RHC/bx Thursday    # Daniella-colonic abscess (7.8 cm) s/p laparoscopic assisted sigmoid colectomy with end colostomy 8/14) positive for VRE  # Daniella-rectal abscess (2.7 cm) s/p drainage 8/12  # R colon colitis  # Positive c. Diff  NG out 8/21, advancing PO as tolerated.  Will continue PPN for today, plan to stop tomorrow pending PO tolerance and calorie counts.  - Cefepime, flagyl, PO vanc, ganciclovir (dose decreased 8/20 with plan to change to valgancyclovir once bowel function has more fully returned).  - Miralax once for decreased output  - Acetaminophen, tramadol PRN pain  - Colorectal, transplant ID following, appreciate assistance    # Recent R internal jugular line-associated thrombus  # Acute anemia   Transfused 1u PRBC overnight for Hgb 6.9.  Holding heparin gtt this AM in setting of falling Hgb.  - Hold heparin gtt  - Trend Hgb    # S/p heart transplant 6/13/2018 (donor 3 vessel CAD  HSV1-, HSV1+, CMV D+/R-, EBV D?/R+, VZV+  tacro goal 7-8 due to infx  Transition to oral immunosuppression tomorrow pending PO tolerance (keep tacro IV).  - Tacro gtt  - Lowered prednisone to 10/10 this admit, on methylpred 8/8 for today  - Continue MMF  - Continue statin  - Received pentamadine 8/17  - Continue nystatin swish  - Planned for RHC/bx tomorrow    # Recent pseudomonas  bacteremia  # Necrotic mouth ulcer, resolving  Had profound neutropenia and pseudomonas bacteremia several weeks ago in associated with necrotic mouth ulcer; neutropenia likely immunosuppression induced.  Neutropenia resolved last admission and pseudomonal bacteremia is likely cleared.  Necrotic ulcer appears to be resolving from prior admission.  Not currently active issues but will continue to monitor.    # Pulmonary nodules  Incidental on CT, will need interval follow up in 4-6 weeks.    # ESRD on TTS HD  - Nephrology consulted, appreciate assistance    # DM2   - Glargine, medium SSI    FEN: Advance as tolerated  PPx: Holding heparin  Dispo: PT/OT to see when appropriate    Bobby Bearden MD  Internal Medicine PGY-3  [phone]    Patient discussed with Dr. Ernst.       Subjective:   Overnight required 1u PRBC.  This AM feels well, has not noted any bleeding.  States his ostomy output appears to have slowed, has not seen any black/bloody/tarry output (nor has nursing).  No acute concerns this AM, plans to get up with PT.          Medications:       acetaminophen  1,000 mg Oral 4x Daily     ceFEPIme (MAXIPIME) IV  1 g Intravenous Q24H     fluticasone  1-2 spray Both Nostrils Daily     ganciclovir (CYTOVENE) intermittent infusion  0.625 mg/kg (Ideal) Intravenous Once per day on Mon Wed Fri     hydrALAZINE  75 mg Oral 4x Daily     insulin aspart  1-6 Units Subcutaneous Q4H     linezolid  600 mg Intravenous Q12H     lipids  250 mL Intravenous Once per day on Sun Mon Tue Wed Thu     methylPREDNISolone  8 mg Intravenous Q12H     metoclopramide  5 mg Intravenous Once     metroNIDAZOLE  500 mg Intravenous Q8H     mycophenolate  500 mg Intravenous Q12H     nystatin  1,000,000 Units Swish & Swallow 4x Daily     pantoprazole  40 mg Intravenous Q24H     sodium chloride (PF)  3 mL Intracatheter Q8H     sodium chloride (PF)  3 mL Intracatheter Q8H     triamcinolone   Topical BID     vancomycin  125 mg Oral 4x Daily       IV  fluid REPLACEMENT ONLY       IV fluid REPLACEMENT ONLY       parenteral nutrition - ADULT compounded formula 40 mL/hr at 08/22/18 0047     tacrolimus (Prograf) infusion ADULT 14 mcg/hr (08/22/18 0047)               Objective:          Physical Exam:   Temp:  [98  F (36.7  C)-99.2  F (37.3  C)] 98.9  F (37.2  C)  Heart Rate:  [] 96  Resp:  [16-20] 18  BP: ()/() 162/107  Cuff Mean (mmHg):  [] 105  SpO2:  [99 %-100 %] 99 %  I/O last 3 completed shifts:  In: 2134.2 [P.O.:300; I.V.:710]  Out: 3150 [Urine:50; Other:2500; Stool:600]    Vitals:    08/18/18 0730 08/19/18 0634 08/20/18 0841 08/21/18 1014   Weight: 79.1 kg (174 lb 6.1 oz) 79.3 kg (174 lb 13.2 oz) 80 kg (176 lb 5.9 oz) 80.5 kg (177 lb 7.5 oz)    08/22/18 0600   Weight: 80.1 kg (176 lb 9.4 oz)     GEN: Laying in bed, NAD  PULM: CTAB from anterior exam  CV: Regular rate and rhythm.  JVP not elevated   ABD: Colostomy site without surrounding redness.  Small brown stool in ostomy.  Incision C/D/I.  Hyperactive bowel sounds.  Mild diffuse tenderness to palpation in lower quadrants improved from prior exam.  EXT: Trace lower extremity edema         Data:   BMP    Recent Labs  Lab 08/21/18  0633 08/20/18  1105 08/19/18  0617 08/18/18  0624   * 134 132* 131*   POTASSIUM 3.8 3.6 4.1 4.6   CHLORIDE 94 98 97 97   TRINI 7.8* 8.8 7.9* 8.0*   CO2 23 26 25 22   BUN 45* 21 34* 48*   CR 3.66* 2.20* 3.65* 5.37*   * 209* 207* 181*     LFTs    Recent Labs  Lab 08/20/18  1105   ALKPHOS 170*   AST 41   ALT 18   BILITOTAL 0.4   PROTTOTAL 6.1*   ALBUMIN 1.7*      CBC    Recent Labs  Lab 08/21/18  2120  08/21/18  0633  08/20/18  1105  08/19/18  1110   WBC 6.0  --  6.1  --  7.0  --  6.0   RBC 2.34*  --  2.55*  --  3.19*  --  3.05*   HGB 6.9*  < > 7.4*  < > 9.1*  < > 8.9*   HCT 20.3*  --  22.3*  --  27.9*  --  27.1*   MCV 87  --  88  --  88  --  89   MCH 29.5  --  29.0  --  28.5  --  29.2   MCHC 34.0  --  33.2  --  32.6  --  32.8   RDW 18.3*  --  18.4*   --  18.6*  --  19.1*   *  --  136*  --  157  --  132*   < > = values in this interval not displayed.  INR    Recent Labs  Lab 08/20/18  1105   INR 0.99

## 2018-08-22 NOTE — PROGRESS NOTES
WOC Consult    S: WOC following patient for new colostomy teaching.     B: Patient with new colostomy on 8/14/18.    A: Pouch intact without evidence of leaks. Scant green output in pouch. Patient sleeping and unable to arose long enough to perform pouch change or participate in teaching session.    P: WOC will see patient for tomorrow for teaching. If patient not appropriate for teaching, WOC will change pouch.    Talia Hernandez RN, CWOCN

## 2018-08-22 NOTE — PLAN OF CARE
Problem: Patient Care Overview  Goal: Plan of Care/Patient Progress Review  Heparin drip discontinued per MD order. Patient voided 50 ml clear brian urine via urinal and assisted to chair. No appreciable amount of drainage in colostomy overnight. Patient calm and pleasant, no complaint of pain at this time.

## 2018-08-22 NOTE — PROGRESS NOTES
CLINICAL NUTRITION SERVICES     Nutrition Prescription    Recommendations already ordered by Registered Dietitian (RD):  Oral supplements prn  Kcal counts 8/22-8/26    Future/Additional Recommendations:  For all recommendations, see prior nutrition notes.     INTERVENTIONS:  Implementation:  Medical food supplement therapy: Placed order to offer oral supplements with meals. Pt has previously had orders to offer Boost and Nepro oral supplements.  Ordered kcal counts 8/22-8/26.  Collaboration with other providers: Per team in interdisciplinary rounds, anticipate one more day of peripheral parenteral nutrition. Notified PharmD.     Follow up/Monitoring:  Will continue to follow pt.    Mary Silva, MS, RD, LD, CNSC   6C Pgr: [phone]

## 2018-08-22 NOTE — PLAN OF CARE
Problem: Patient Care Overview  Goal: Plan of Care/Patient Progress Review  OT6C:   Discharge Planner OT   Patient plan for discharge: Rehab   Current status: Pt supine<>sit EOB Ilsa HOB elevated. Pt sit<>stand x5 throughout session, Ilsa + FWW. Pt completed g/h activities standing at sink with set up assistance, needing x2 seated rest breaks throughout oral, facial, and body hygiene. Pt ambulated short distance in room ~15' CGA +fww. Pt modA to completed LBD, seated in chair. Pt tolerated 10 minutes of exercise using LE ergometer at light resistance, BP after activity: 146/95(115), RN informed, pt asymptomatic.   Barriers to return to prior living situation: medical status, below baseline for functional ambulation and I/ADLs, poor activity tolerance, and decreased strength.   Recommendations for discharge: TCU  Rationale for recommendations: Pt would benefit from continued therapy to progress strength and endurance and progress to return to baseline level of functioning with I/ADLs and functional mobility        Entered by: Ashlie Puente 08/22/2018 12:58 PM

## 2018-08-22 NOTE — PLAN OF CARE
Problem: Patient Care Overview  Goal: Plan of Care/Patient Progress Review  Vital signs stable, BP ranged from 114/79 (MAP 85) to 162/107 (). Patient admitted to discomfort at abdomen but fell asleep and sleeping between cares rest of night. Patient got one unit of blood for hemoglobin 6.9, blood transfused without reaction. New 20 gauge PIV was needed and placed by vascular access nurse. In other PIVs patient has TPN at 40 ml/hr with lipids at 20.8 ml/ hr, Heparin at 600 units/ hr, and Tacrolimus at 14 mcg/hr. Glucose was 281 at 0230, covered with 3 units of insulin per order. Continue cares, monitor labs, end TPN today, continue antibiotics and other meds as ordered, patient has dialysis [id number].

## 2018-08-22 NOTE — PLAN OF CARE
Problem: Patient Care Overview  Goal: Plan of Care/Patient Progress Review  Discharge Planner PT   Patient plan for discharge: TCU  Current status: patient needs min A bed mobility and transfers. Not able to ambulate this day secondary to dizziness. See vitals flowsheet for BP values.  Barriers to return to prior living situation: level of assist, medical stability  Recommendations for discharge: TCU  Rationale for recommendations: pt would benefit from TCU to progress safety and indep with functional mobility        Entered by: Scarlet Tavarez 08/22/2018 4:54 PM

## 2018-08-22 NOTE — PROVIDER NOTIFICATION
D:pt has three PIV's receiving multiple antibiotics PPN, heparin, now starting prograf gtt. Antibiotics being delayed secondary to incompatibility  I:notified Primary about medication delay and prograf gtt  A:per Primary start a fourth PIV, defer PICC  P:per Primary

## 2018-08-22 NOTE — PROGRESS NOTES
Colorectal Surgery Progress Note  POD#10    Subjective:  Yesterday had ostomy output, some UOP, tolerated CLD and Boost. Ostomy output has slowed overnight    Vitals:  Vitals:    08/22/18 0331 08/22/18 0415 08/22/18 0600 08/22/18 0725   BP: (!) 155/107 (!) 162/107  130/86   BP Location: Left arm Left arm  Left arm   Pulse:       Resp: 16 18  18   Temp: 98.3  F (36.8  C) 98.9  F (37.2  C)  98.4  F (36.9  C)   TempSrc: Oral Oral  Oral   SpO2: 99%   100%   Weight:   80.1 kg (176 lb 9.4 oz)    Height:           I/O:  I/O last 3 completed shifts:  In: 2134.2 [P.O.:300; I.V.:710]  Out: 3150 [Urine:50; Other:2500; Stool:600]    Physical Exam:  Gen: AAOx3, NAD  Pulm: Non-labored breathing  Abd: Soft, non-distended, non-tender   Stoma pink and viable with small dark stool and gas in bag  Ext:  Warm and well-perfused    BMP  Recent Labs  Lab 08/21/18  0633 08/20/18  1105 08/19/18  0617 08/18/18  0624 08/17/18  0644   * 134 132* 131* 134   POTASSIUM 3.8 3.6 4.1 4.6 4.3   CHLORIDE 94 98 97 97 100   CO2 23 26 25 22 24   BUN 45* 21 34* 48* 28   CR 3.66* 2.20* 3.65* 5.37* 4.07*   * 209* 207* 181* 156*   MAG 2.2 1.7 1.9 2.0 1.9   PHOS 2.9 1.5* 3.1  --  3.9     CBC  Recent Labs  Lab 08/21/18  2120 08/21/18  1625 08/21/18  0633 08/20/18  1643 08/20/18  1105  08/19/18  1110   WBC 6.0  --  6.1  --  7.0  --  6.0   HGB 6.9* 7.7* 7.4* 7.9* 9.1*  < > 8.9*   HCT 20.3*  --  22.3*  --  27.9*  --  27.1*   *  --  136*  --  157  --  132*   < > = values in this interval not displayed.    ASSESSMENT: This is a 63 year old male H/O heart TXP 6/2018 on immunosuppressants, ESRD on HD, R internal jugular thrombus on apixban (held, last dose 8/16), now on heparin gtt, T2DM, ongoing psuedomonas bacteremia p/w abd pain and BRBPR w/ first episode diverticulitis cb paracolonic abscess seen on CT. Co-incidental R sided anorectal abscess. 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   - Completed 48 hours of post-op ABX, defer to primary / ID for duration of therapy  - PO vanc for C.diff per primary  - WOCN teaching today     Activity: as tolerated.  Ppx: lovenox, zantac    Bird Fairchild MD  General Surgery PGY-1  Colon and Rectal Surgery Service  [phone]    Patient was seen with team and discussed with staff

## 2018-08-23 LAB
ABO + RH BLD: NORMAL
ABO + RH BLD: NORMAL
ANION GAP SERPL CALCULATED.3IONS-SCNC: 13 MMOL/L (ref 3–14)
BASOPHILS # BLD AUTO: 0 10E9/L (ref 0–0.2)
BASOPHILS NFR BLD AUTO: 0 %
BLD GP AB SCN SERPL QL: NORMAL
BLD PROD TYP BPU: NORMAL
BLD PROD TYP BPU: NORMAL
BLD UNIT ID BPU: 0
BLOOD BANK CMNT PATIENT-IMP: NORMAL
BLOOD PRODUCT CODE: NORMAL
BPU ID: NORMAL
BUN SERPL-MCNC: 65 MG/DL (ref 7–30)
CALCIUM SERPL-MCNC: 7.4 MG/DL (ref 8.5–10.1)
CHLORIDE SERPL-SCNC: 90 MMOL/L (ref 94–109)
CO2 SERPL-SCNC: 23 MMOL/L (ref 20–32)
CREAT SERPL-MCNC: 4.25 MG/DL (ref 0.66–1.25)
DIFFERENTIAL METHOD BLD: ABNORMAL
EOSINOPHIL # BLD AUTO: 0 10E9/L (ref 0–0.7)
EOSINOPHIL NFR BLD AUTO: 0 %
ERYTHROCYTE [DISTWIDTH] IN BLOOD BY AUTOMATED COUNT: 18 % (ref 10–15)
GFR SERPL CREATININE-BSD FRML MDRD: 14 ML/MIN/1.7M2
GLUCOSE BLDC GLUCOMTR-MCNC: 196 MG/DL (ref 70–99)
GLUCOSE BLDC GLUCOMTR-MCNC: 264 MG/DL (ref 70–99)
GLUCOSE BLDC GLUCOMTR-MCNC: 268 MG/DL (ref 70–99)
GLUCOSE BLDC GLUCOMTR-MCNC: 268 MG/DL (ref 70–99)
GLUCOSE BLDC GLUCOMTR-MCNC: 274 MG/DL (ref 70–99)
GLUCOSE BLDC GLUCOMTR-MCNC: 314 MG/DL (ref 70–99)
GLUCOSE BLDC GLUCOMTR-MCNC: 321 MG/DL (ref 70–99)
GLUCOSE SERPL-MCNC: 276 MG/DL (ref 70–99)
HCT VFR BLD AUTO: 19.4 % (ref 40–53)
HGB BLD-MCNC: 6.9 G/DL (ref 13.3–17.7)
HGB BLD-MCNC: 8.9 G/DL (ref 13.3–17.7)
IMM GRANULOCYTES # BLD: 0.1 10E9/L (ref 0–0.4)
IMM GRANULOCYTES NFR BLD: 0.9 %
LYMPHOCYTES # BLD AUTO: 0.3 10E9/L (ref 0.8–5.3)
LYMPHOCYTES NFR BLD AUTO: 6.2 %
MAGNESIUM SERPL-MCNC: 2.1 MG/DL (ref 1.6–2.3)
MCH RBC QN AUTO: 31.4 PG (ref 26.5–33)
MCHC RBC AUTO-ENTMCNC: 35.6 G/DL (ref 31.5–36.5)
MCV RBC AUTO: 88 FL (ref 78–100)
MONOCYTES # BLD AUTO: 0.2 10E9/L (ref 0–1.3)
MONOCYTES NFR BLD AUTO: 3.6 %
NEUTROPHILS # BLD AUTO: 4.9 10E9/L (ref 1.6–8.3)
NEUTROPHILS NFR BLD AUTO: 89.3 %
NRBC # BLD AUTO: 0 10*3/UL
NRBC BLD AUTO-RTO: 0 /100
NUM BPU REQUESTED: 2
PLATELET # BLD AUTO: 108 10E9/L (ref 150–450)
PLATELET # BLD EST: ABNORMAL 10*3/UL
POTASSIUM SERPL-SCNC: 3.6 MMOL/L (ref 3.4–5.3)
RBC # BLD AUTO: 2.2 10E12/L (ref 4.4–5.9)
SODIUM SERPL-SCNC: 125 MMOL/L (ref 133–144)
SPECIMEN EXP DATE BLD: NORMAL
TACROLIMUS BLD-MCNC: <3 UG/L (ref 5–15)
TME LAST DOSE: ABNORMAL H
TRANSFUSION STATUS PATIENT QL: NORMAL
TRANSFUSION STATUS PATIENT QL: NORMAL
WBC # BLD AUTO: 5.5 10E9/L (ref 4–11)

## 2018-08-23 PROCEDURE — 85018 HEMOGLOBIN: CPT | Performed by: STUDENT IN AN ORGANIZED HEALTH CARE EDUCATION/TRAINING PROGRAM

## 2018-08-23 PROCEDURE — 25000125 ZZHC RX 250: Performed by: INTERNAL MEDICINE

## 2018-08-23 PROCEDURE — 21400003 ZZH R&B CCU CRITICAL UMMC

## 2018-08-23 PROCEDURE — 25000132 ZZH RX MED GY IP 250 OP 250 PS 637: Mod: GY | Performed by: INTERNAL MEDICINE

## 2018-08-23 PROCEDURE — 25000125 ZZHC RX 250: Performed by: COLON & RECTAL SURGERY

## 2018-08-23 PROCEDURE — 25000132 ZZH RX MED GY IP 250 OP 250 PS 637: Mod: GY

## 2018-08-23 PROCEDURE — 25000131 ZZH RX MED GY IP 250 OP 636 PS 637: Mod: GY | Performed by: INTERNAL MEDICINE

## 2018-08-23 PROCEDURE — 83735 ASSAY OF MAGNESIUM: CPT | Performed by: COLON & RECTAL SURGERY

## 2018-08-23 PROCEDURE — A9270 NON-COVERED ITEM OR SERVICE: HCPCS | Mod: GY

## 2018-08-23 PROCEDURE — 80197 ASSAY OF TACROLIMUS: CPT

## 2018-08-23 PROCEDURE — 25000132 ZZH RX MED GY IP 250 OP 250 PS 637: Mod: GY | Performed by: STUDENT IN AN ORGANIZED HEALTH CARE EDUCATION/TRAINING PROGRAM

## 2018-08-23 PROCEDURE — 63400005 ZZH RX 634: Performed by: INTERNAL MEDICINE

## 2018-08-23 PROCEDURE — P9016 RBC LEUKOCYTES REDUCED: HCPCS | Performed by: STUDENT IN AN ORGANIZED HEALTH CARE EDUCATION/TRAINING PROGRAM

## 2018-08-23 PROCEDURE — A9270 NON-COVERED ITEM OR SERVICE: HCPCS | Mod: GY | Performed by: INTERNAL MEDICINE

## 2018-08-23 PROCEDURE — 90937 HEMODIALYSIS REPEATED EVAL: CPT

## 2018-08-23 PROCEDURE — 25000128 H RX IP 250 OP 636: Performed by: INTERNAL MEDICINE

## 2018-08-23 PROCEDURE — A9270 NON-COVERED ITEM OR SERVICE: HCPCS | Mod: GY | Performed by: COLON & RECTAL SURGERY

## 2018-08-23 PROCEDURE — 36415 COLL VENOUS BLD VENIPUNCTURE: CPT | Performed by: COLON & RECTAL SURGERY

## 2018-08-23 PROCEDURE — 25000132 ZZH RX MED GY IP 250 OP 250 PS 637: Mod: GY | Performed by: COLON & RECTAL SURGERY

## 2018-08-23 PROCEDURE — 00000146 ZZHCL STATISTIC GLUCOSE BY METER IP

## 2018-08-23 PROCEDURE — 80048 BASIC METABOLIC PNL TOTAL CA: CPT | Performed by: COLON & RECTAL SURGERY

## 2018-08-23 PROCEDURE — 25000125 ZZHC RX 250: Performed by: STUDENT IN AN ORGANIZED HEALTH CARE EDUCATION/TRAINING PROGRAM

## 2018-08-23 PROCEDURE — 99233 SBSQ HOSP IP/OBS HIGH 50: CPT | Mod: GC | Performed by: INTERNAL MEDICINE

## 2018-08-23 PROCEDURE — 36415 COLL VENOUS BLD VENIPUNCTURE: CPT | Performed by: STUDENT IN AN ORGANIZED HEALTH CARE EDUCATION/TRAINING PROGRAM

## 2018-08-23 PROCEDURE — A9270 NON-COVERED ITEM OR SERVICE: HCPCS | Mod: GY | Performed by: STUDENT IN AN ORGANIZED HEALTH CARE EDUCATION/TRAINING PROGRAM

## 2018-08-23 PROCEDURE — 85025 COMPLETE CBC W/AUTO DIFF WBC: CPT | Performed by: COLON & RECTAL SURGERY

## 2018-08-23 PROCEDURE — 25000128 H RX IP 250 OP 636: Performed by: STUDENT IN AN ORGANIZED HEALTH CARE EDUCATION/TRAINING PROGRAM

## 2018-08-23 RX ORDER — MYCOPHENOLATE MOFETIL 250 MG/1
500 CAPSULE ORAL 2 TIMES DAILY
Status: DISCONTINUED | OUTPATIENT
Start: 2018-08-23 | End: 2018-09-06

## 2018-08-23 RX ORDER — DOXERCALCIFEROL 4 UG/2ML
4 INJECTION INTRAVENOUS
Status: COMPLETED | OUTPATIENT
Start: 2018-08-23 | End: 2018-08-23

## 2018-08-23 RX ORDER — METRONIDAZOLE 500 MG/1
500 TABLET ORAL EVERY 8 HOURS SCHEDULED
Status: DISCONTINUED | OUTPATIENT
Start: 2018-08-23 | End: 2018-08-27

## 2018-08-23 RX ORDER — POLYETHYLENE GLYCOL 3350 17 G/17G
17 POWDER, FOR SOLUTION ORAL DAILY PRN
Status: DISCONTINUED | OUTPATIENT
Start: 2018-08-23 | End: 2018-09-11 | Stop reason: HOSPADM

## 2018-08-23 RX ORDER — SODIUM PHOSPHATE,MONOBASIC
4 GRANULES (GRAM) MISCELLANEOUS ONCE
Status: COMPLETED | OUTPATIENT
Start: 2018-08-23 | End: 2018-08-23

## 2018-08-23 RX ORDER — POLYETHYLENE GLYCOL 3350 17 G/17G
17 POWDER, FOR SOLUTION ORAL DAILY
Status: DISCONTINUED | OUTPATIENT
Start: 2018-08-23 | End: 2018-08-23

## 2018-08-23 RX ORDER — HEPARIN SODIUM 1000 [USP'U]/ML
3 INJECTION, SOLUTION INTRAVENOUS; SUBCUTANEOUS ONCE
Status: COMPLETED | OUTPATIENT
Start: 2018-08-23 | End: 2018-08-23

## 2018-08-23 RX ORDER — PREDNISONE 10 MG/1
10 TABLET ORAL 2 TIMES DAILY WITH MEALS
Status: DISCONTINUED | OUTPATIENT
Start: 2018-08-23 | End: 2018-08-28

## 2018-08-23 RX ORDER — CIPROFLOXACIN 250 MG/1
500 TABLET, FILM COATED ORAL
Status: DISCONTINUED | OUTPATIENT
Start: 2018-08-23 | End: 2018-08-27

## 2018-08-23 RX ORDER — PANTOPRAZOLE SODIUM 40 MG/1
40 TABLET, DELAYED RELEASE ORAL
Status: DISCONTINUED | OUTPATIENT
Start: 2018-08-24 | End: 2018-08-23

## 2018-08-23 RX ORDER — LINEZOLID 600 MG/1
600 TABLET, FILM COATED ORAL EVERY 12 HOURS SCHEDULED
Status: DISCONTINUED | OUTPATIENT
Start: 2018-08-23 | End: 2018-08-27

## 2018-08-23 RX ADMIN — NYSTATIN 1000000 UNITS: 100000 SUSPENSION ORAL at 16:43

## 2018-08-23 RX ADMIN — MYCOPHENOLATE MOFETIL 500 MG: 250 CAPSULE ORAL at 21:33

## 2018-08-23 RX ADMIN — Medication: at 08:18

## 2018-08-23 RX ADMIN — DOXERCALCIFEROL 4 MCG: 4 INJECTION, SOLUTION INTRAVENOUS at 09:57

## 2018-08-23 RX ADMIN — NYSTATIN 1000000 UNITS: 100000 SUSPENSION ORAL at 13:07

## 2018-08-23 RX ADMIN — HEPARIN SODIUM 3000 UNITS: 1000 INJECTION, SOLUTION INTRAVENOUS; SUBCUTANEOUS at 11:50

## 2018-08-23 RX ADMIN — HYDRALAZINE HYDROCHLORIDE 75 MG: 50 TABLET ORAL at 13:07

## 2018-08-23 RX ADMIN — VANCOMYCIN HYDROCHLORIDE 125 MG: KIT at 09:55

## 2018-08-23 RX ADMIN — EPOETIN ALFA 7600 UNITS: 10000 SOLUTION INTRAVENOUS; SUBCUTANEOUS at 10:17

## 2018-08-23 RX ADMIN — ACETAMINOPHEN 1000 MG: 500 TABLET, FILM COATED ORAL at 21:33

## 2018-08-23 RX ADMIN — PREDNISONE 10 MG: 10 TABLET ORAL at 17:40

## 2018-08-23 RX ADMIN — NYSTATIN 1000000 UNITS: 100000 SUSPENSION ORAL at 09:51

## 2018-08-23 RX ADMIN — INSULIN GLARGINE 10 UNITS: 100 INJECTION, SOLUTION SUBCUTANEOUS at 09:38

## 2018-08-23 RX ADMIN — PANTOPRAZOLE SODIUM 40 MG: 40 INJECTION, POWDER, FOR SOLUTION INTRAVENOUS at 13:11

## 2018-08-23 RX ADMIN — LINEZOLID 600 MG: 600 TABLET, FILM COATED ORAL at 21:34

## 2018-08-23 RX ADMIN — SODIUM CHLORIDE 300 ML: 9 INJECTION, SOLUTION INTRAVENOUS at 08:08

## 2018-08-23 RX ADMIN — SODIUM CHLORIDE 250 ML: 9 INJECTION, SOLUTION INTRAVENOUS at 08:08

## 2018-08-23 RX ADMIN — Medication 4 MG%: at 10:14

## 2018-08-23 RX ADMIN — METHYLPREDNISOLONE SODIUM SUCCINATE 8 MG: 40 INJECTION, POWDER, LYOPHILIZED, FOR SOLUTION INTRAMUSCULAR; INTRAVENOUS at 13:36

## 2018-08-23 RX ADMIN — CIPROFLOXACIN HYDROCHLORIDE 500 MG: 250 TABLET, FILM COATED ORAL at 17:40

## 2018-08-23 RX ADMIN — INSULIN ASPART 2 UNITS: 100 INJECTION, SOLUTION INTRAVENOUS; SUBCUTANEOUS at 14:28

## 2018-08-23 RX ADMIN — METRONIDAZOLE 500 MG: 500 TABLET ORAL at 21:34

## 2018-08-23 RX ADMIN — VANCOMYCIN HYDROCHLORIDE 125 MG: KIT at 16:43

## 2018-08-23 RX ADMIN — LINEZOLID 600 MG: 600 INJECTION, SOLUTION INTRAVENOUS at 07:36

## 2018-08-23 RX ADMIN — GANCICLOVIR SODIUM 40 MG: 500 INJECTION, POWDER, LYOPHILIZED, FOR SOLUTION INTRAVENOUS at 18:14

## 2018-08-23 RX ADMIN — METRONIDAZOLE 500 MG: 500 TABLET ORAL at 18:14

## 2018-08-23 RX ADMIN — HYDRALAZINE HYDROCHLORIDE 75 MG: 50 TABLET ORAL at 16:43

## 2018-08-23 RX ADMIN — ACETAMINOPHEN 1000 MG: 500 TABLET, FILM COATED ORAL at 09:49

## 2018-08-23 RX ADMIN — METRONIDAZOLE 500 MG: 500 INJECTION, SOLUTION INTRAVENOUS at 06:02

## 2018-08-23 RX ADMIN — MYCOPHENOLATE MOFETIL 500 MG: 500 INJECTION, POWDER, LYOPHILIZED, FOR SOLUTION INTRAVENOUS at 14:12

## 2018-08-23 RX ADMIN — INSULIN ASPART 3 UNITS: 100 INJECTION, SOLUTION INTRAVENOUS; SUBCUTANEOUS at 09:36

## 2018-08-23 RX ADMIN — VANCOMYCIN HYDROCHLORIDE 125 MG: KIT at 21:34

## 2018-08-23 RX ADMIN — INSULIN ASPART 3 UNITS: 100 INJECTION, SOLUTION INTRAVENOUS; SUBCUTANEOUS at 19:00

## 2018-08-23 RX ADMIN — POLYETHYLENE GLYCOL 3350 17 G: 17 POWDER, FOR SOLUTION ORAL at 13:07

## 2018-08-23 RX ADMIN — VANCOMYCIN HYDROCHLORIDE 125 MG: KIT at 13:56

## 2018-08-23 RX ADMIN — INSULIN ASPART 4 UNITS: 100 INJECTION, SOLUTION INTRAVENOUS; SUBCUTANEOUS at 02:16

## 2018-08-23 RX ADMIN — HYDRALAZINE HYDROCHLORIDE 75 MG: 50 TABLET ORAL at 21:33

## 2018-08-23 RX ADMIN — INSULIN ASPART 4 UNITS: 100 INJECTION, SOLUTION INTRAVENOUS; SUBCUTANEOUS at 21:45

## 2018-08-23 RX ADMIN — ACETAMINOPHEN 1000 MG: 500 TABLET, FILM COATED ORAL at 13:07

## 2018-08-23 RX ADMIN — CEFEPIME HYDROCHLORIDE 1 G: 1 INJECTION, POWDER, FOR SOLUTION INTRAMUSCULAR; INTRAVENOUS at 04:07

## 2018-08-23 RX ADMIN — NYSTATIN 1000000 UNITS: 100000 SUSPENSION ORAL at 21:45

## 2018-08-23 RX ADMIN — ACETAMINOPHEN 1000 MG: 500 TABLET, FILM COATED ORAL at 16:43

## 2018-08-23 RX ADMIN — INSULIN ASPART 3 UNITS: 100 INJECTION, SOLUTION INTRAVENOUS; SUBCUTANEOUS at 06:06

## 2018-08-23 RX ADMIN — Medication 50 MG: at 21:33

## 2018-08-23 ASSESSMENT — ACTIVITIES OF DAILY LIVING (ADL)
ADLS_ACUITY_SCORE: 12

## 2018-08-23 ASSESSMENT — PAIN DESCRIPTION - DESCRIPTORS
DESCRIPTORS: ACHING;DULL
DESCRIPTORS: ACHING;DULL

## 2018-08-23 NOTE — PLAN OF CARE
Problem: Patient Care Overview  Goal: Plan of Care/Patient Progress Review  Telemetry shows SR overnight. Patient sleeping between cares, no complaint of pain. TPN at 40 ml/hr, lipids at 20.8 ml/hr, Tacrolimus at 42 mcg/hr, and various antibiotics. Glucose 314 at 0200 and 268 at 0600 with insulin coverage. Plan dialysis today.

## 2018-08-23 NOTE — PLAN OF CARE
Problem: Patient Care Overview  Goal: Plan of Care/Patient Progress Review  Outcome: No Change  Pt A/O. See flowsheets for VS. NSR. RA. Pain managed with scheduled regimen. BG assessed and managed per protocol. Pt completed HD today. Pt had 1 unit of blood in HD, per report from dialysis. Hgb recheck this afternoon 8.9. Colostomy bag in place, stool noted to be black when emptied per another RN; writer updated Cards 2 MD with this. TPN order d/c'ed; TPN rate halved/reduced to 20ml/hr this afternoon until runs out. Tacro gtt increased per order, now at 84mcg/hr. Abx as ordered. IV meds from this AM given late d/t pt being in HD this AM. Makes needs known. Continue with plan of care and report changes to treatment team.

## 2018-08-23 NOTE — PROGRESS NOTES
HEMODIALYSIS TREATMENT NOTE    Date: 8/23/2018  Time: 12:06 PM    Data:  Pre Wt: 83.5 kg   Desired Wt: 80.0 kg   Ultrafiltration - Post Run Net Total Removed (mL): 3500 mL  Ultrafiltration - Post Run Net Total Gain (mL): 0 mL  Vascular Access Status: Yes, secured and visible  Dialyzer Rinse: Clear, Streaked  Total Blood Volume Processed: 71.7 L  Total Dialysis (Treatment) Time:  3.5 hours    Lab:   No      Assessment/Interventions:  3.5 hours of HD via tunneled right subclavian CVC on K4Ca3 bath. Hypertensive pre-HD but blood pressures came down with fluid removal. Last /83. Tolerated well. O2 applied @ 2 lpm via nasal cannula just while on HD per patient request. VSS throughout. Scheduled Tylenol, Nystatin, and oral Vanco given. Daily AM dose of Lantus (10 units) and 3 units of Novolog given for BG of 274, per parameters. Patient refused scheduled Triamcinolone and Flonase. AM dose of Hydralazine held to assist with fluid removal. 4% Phosphorus added to bicarbonate. Epogen and Hectorol administered as ordered. 1 unit of PRBCs transfused without complications. UF goal adjusted to include PRBC volume and prime/rinseback. 3.5 kg total net removed. CVC Heparin locked post-treatment. See MAR for medication details. Report given to primary RN on 6C.       Plan:    Regularly dialyzes TTS. Next run per renal team.

## 2018-08-23 NOTE — PROGRESS NOTES
Nephrology Progress Note  08/23/2018       Murray Nicholson is a 63 year old male with Heart transplant 6/18, ESRD on HD, HTN, Right internal jugular thrombus on Aphixaban, recent admission 7/26-8/6/18  for pseudomonas bacteremia felt to be 2/2 probable prececal colitis and necrotic mouth ulcer, admitted 8/12/18 with abdominal pain, bloody stool and fever, found to have C diff infection, anorectal abscess and pericolonic abscess.        ASSESSMENT AND RECOMMENDATIONS:       ESRD - Etiology of ESRD 2/2 HTN, DM, CRS.   Has chronic OP HD at [facility], TTS schedule, under care of Dr Mcpherson. Right TDC, EDW 76 kg    - Will continue TTS schedule        Volume status -  EDW 76.0 kg. Appears hypervolemic and is 7.5 kg over EDW. Anuric. High volume input with TPN since 8/20, large weight gains; diet being advanced so hopefully will stop TPN soon; has some minimal colostomy output.   - Continue pre run weights, standing   - Strict I/O  -  3-4 kg UF today  - Please condense TPN and meds/fluids as much as possible    Hypervolemic hyponatremia: ESRD and inability to excrete free water  - As above, condense fluids, limit fluid and Na intake  - Dialyzing on low Na bath to avoid fast correction      BP: -140's      Transplant IS regimen: Tac, MMF, Pred.       BMD - Ca 7.4, alb 1.7, Phos 2.7    - Adding phos to bath today  - Starting hectorol 4 mcg per HD      Anemia - Hgb 6.9 today, though appears to be dilutional, especially given high volume TPN. No s/s of bleed. On Epo 7600 q run.    - Continue epogen  - Getting 1 unit PRBC today      Pericolonic abscess, small perircal abscess 2.7 cm.- Underwent I/D anorectal abscess on 8/13 and  Sigmoidectomy/end colostomy on 8/14. Also has C diff, on PO vanc. Antimicrobials per primary. CT suggestive of an ileus and NG tube was placed, now resolved and NG tube out      Lung nodules - New Right lower lobe pulmonary nodules seen on CT this admission. ID recommending close f/u.  "        Recommendations were communicated to primary team via progress note          Interval History :   Seen on dialysis, attempting 3-4 kg UF, large weight gain due to high volume TPN. Low hgb (dilutional), getting 1 unit PRBC. Adding phos to bath. Feels generally tired and not well but denies CP, SOB, chills.      Review of Systems:   4 point ROS negative other than as noted above.    Physical Exam:   I/O last 3 completed shifts:  In: 2588.93 [P.O.:660; I.V.:610]  Out: 0    /71  Pulse 101  Temp 98.3  F (36.8  C)  Resp 18  Ht 1.753 m (5' 9\")  Wt 83.5 kg (184 lb 1.4 oz)  SpO2 100%  BMI 27.18 kg/m2     GENERAL APPEARANCE: alert, NAD  EYES: no scleral icterus, pupils equal  Pulmonary: lungs CTA.   CV: RRR   - Edema - trace LE, increasing facial edema  GI: soft, colostomy/stoma   MS: no evidence of inflammation in joints, no muscle tenderness  SKIN: no rash, warm, dry, no cyanosis  NEURO: alert/oriented  ACCESS: Right TDC    Labs:   All labs reviewed by me  Electrolytes/Renal -   Recent Labs   Lab Test  08/23/18   0620  08/22/18   0741  08/21/18   0633  08/20/18   1105   NA  125*  128*  128*  134   POTASSIUM  3.6  3.3*  3.8  3.6   CHLORIDE  90*  93*  94  98   CO2  23  24  23  26   BUN  65*  46*  45*  21   CR  4.25*  3.22*  3.66*  2.20*   GLC  276*  256*  294*  209*   TRINI  7.4*  7.3*  7.8*  8.8   MAG  2.1  1.8  2.2  1.7   PHOS   --   2.7  2.9  1.5*       CBC -   Recent Labs   Lab Test  08/23/18   0620  08/22/18   1807  08/22/18   0741  08/21/18   2120   WBC  5.5   --   7.2  6.0   HGB  6.9*  7.7*  7.9*  6.9*   PLT  108*   --   119*  118*       LFTs -   Recent Labs   Lab Test  08/20/18   1105  08/14/18   0620  08/13/18   0618   ALKPHOS  170*  110  99   BILITOTAL  0.4  0.3  0.3   ALT  18  17  17   AST  41  29  28   PROTTOTAL  6.1*  5.3*  5.2*   ALBUMIN  1.7*  1.6*  1.6*       Iron Panel -   Recent Labs   Lab Test  07/10/18   0711  05/08/18   0954  07/19/17   1306   IRON  66  58  46   IRONSAT  28  27  18 "   ALBERTINA  771*  621*  369       Current Medications:    acetaminophen  1,000 mg Oral 4x Daily     ceFEPIme (MAXIPIME) IV  1 g Intravenous Q24H     doxercalciferol  4 mcg Intravenous Once in dialysis     epoetin emily (EPOGEN,PROCRIT) inj ESRD  7,600 Units Intravenous Once in dialysis     fluticasone  1-2 spray Both Nostrils Daily     ganciclovir (CYTOVENE) intermittent infusion  0.625 mg/kg (Ideal) Intravenous Once per day on Tue Thu Sat     gelatin absorbable  1 each Topical During Hemodialysis (from stock)     hydrALAZINE  75 mg Oral 4x Daily     insulin aspart  1-6 Units Subcutaneous Q4H     insulin glargine  10 Units Subcutaneous QAM AC     linezolid  600 mg Intravenous Q12H     lipids  250 mL Intravenous Once per day on Sun Mon Tue Wed Thu     methylPREDNISolone  8 mg Intravenous Q12H     metroNIDAZOLE  500 mg Intravenous Q8H     mycophenolate  500 mg Intravenous Q12H     nystatin  1,000,000 Units Swish & Swallow 4x Daily     pantoprazole  40 mg Intravenous Q24H     polyethylene glycol  17 g Oral Daily     sodium chloride (PF)  3 mL Intracatheter During Hemodialysis (from stock)     sodium chloride (PF)  3 mL Intracatheter During Hemodialysis (from stock)     sodium chloride (PF)  3 mL Intracatheter Q8H     sodium chloride (PF)  3 mL Intracatheter Q8H     triamcinolone   Topical BID     vancomycin  125 mg Oral 4x Daily       IV fluid REPLACEMENT ONLY       IV fluid REPLACEMENT ONLY       parenteral nutrition - ADULT compounded formula 40 mL/hr at 08/22/18 1916     tacrolimus (Prograf) infusion ADULT 42 mcg/hr (08/22/18 1703)     Kristi Armas PA-C

## 2018-08-23 NOTE — PLAN OF CARE
Problem: Patient Care Overview  Goal: Plan of Care/Patient Progress Review  OT 6C: cx. Pt. off unit at time of attempt; Dialysis.

## 2018-08-23 NOTE — PROGRESS NOTES
Colorectal Surgery Progress Note  POD#11    Subjective:  Yesterday received 1U PRBC for Hgb 6.9. Tolerated FLD well, no nausea or vomiting. Continues to have ostomy output of both gas and stool. Ambulating, some discomfort with this. Pain well-controlled. Eager to eat peanut butter toast.     Vitals:  Vitals:    08/22/18 1926 08/23/18 0200 08/23/18 0631 08/23/18 0720   BP: 120/75 (!) 134/103  (!) 149/97   BP Location: Left arm Left arm  Right arm   Pulse:       Resp: 20 18 18   Temp: 98.4  F (36.9  C) 98.2  F (36.8  C)  98.3  F (36.8  C)   TempSrc: Oral Oral  Oral   SpO2: 100% 100%  100%   Weight:   83.5 kg (184 lb 1.4 oz)    Height:           I/O:  I/O last 3 completed shifts:  In: 2588.93 [P.O.:660; I.V.:610]  Out: 0     Physical Exam:  Gen: AAOx3, NAD  Pulm: Non-labored breathing  Abd: Soft, non-distended, non-tender   Incision C/D/I with dermabond    Stoma pink and viable with dark green-dahlia stool and gas in bag  Ext:  Warm and well-perfused    BMP  Recent Labs  Lab 08/23/18  0620 08/22/18  0741 08/21/18  0633 08/20/18  1105 08/19/18  0617   * 128* 128* 134 132*   POTASSIUM 3.6 3.3* 3.8 3.6 4.1   CHLORIDE 90* 93* 94 98 97   CO2 23 24 23 26 25   BUN 65* 46* 45* 21 34*   CR 4.25* 3.22* 3.66* 2.20* 3.65*   * 256* 294* 209* 207*   MAG 2.1 1.8 2.2 1.7 1.9   PHOS  --  2.7 2.9 1.5* 3.1     CBC  Recent Labs  Lab 08/23/18  0620 08/22/18  1807 08/22/18  0741 08/21/18  2120  08/21/18  0633   WBC 5.5  --  7.2 6.0  --  6.1   HGB 6.9* 7.7* 7.9* 6.9*  < > 7.4*   HCT 19.4*  --  23.5* 20.3*  --  22.3*   *  --  119* 118*  --  136*   < > = values in this interval not displayed.      ASSESSMENT: This is a 63 year old male with NICM s/p heart transplant 6/2018 on immunosuppressants, with ESRD on HD, R internal jugular thrombus previously on apixaban, T2DM, ongoing pseudomonal bacteremia admitted to cards service for painless BRBPR. Found to have diverticulitis with 7.8 cm pericolonic abscess without safe window  for IR drain. 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 Post-op course complicated by ileus, now resolved with return of good bowel function. Was on heparin gtt for R internal jugular thrombus, however was stopped due to concern for bleeding with slowly downtrending Hgb.     - May advance to low residue diet, daily miralax; TPN discontinuation 8/24 per primary and dietary teams  - Replete lytes to Mg >2, Phos >3, K >4  - Anticoagulation per primary team  - ABX per primary team, has completed 48 hours of post-op ABX  - Encourage ambulation AMELIA Fairchild MD  General Surgery PGY-1  Colon and Rectal Surgery Service  [phone]    Patient was seen with team and discussed with staff

## 2018-08-23 NOTE — PROGRESS NOTES
Calorie Counts  Intake recorded for: 8/22  Kcals: 382  Protein: 14g  # Meals Recorded: 75% cream of wheat   # Supplements Recorded: 50% 1 Boost Breeze & 50% 1 Boost Plus

## 2018-08-23 NOTE — PLAN OF CARE
Problem: Patient Care Overview  Goal: Plan of Care/Patient Progress Review  Outcome: No Change  D/I: A&O. VSS on RA. Monitor shows SR in 90's. Continues on PPN at 40 (PIV infiltrated, new PIV ordered), tacrolimus drip at 2.1ml/hour. On multiple antibiotics for bowel infection and c-diff. Scant dark liquid output from ostomy since yesterday at 1000. C/o dull abd discomfort. Abd soft, non-distended. Tolerating some full liquid diet, needs encouragement. Started on [place] counts and drank 1/2 carton of boost this shift. C/O abdominal pain but declining tylenol. Received tramadol x1 at HS. Declined getting OOB this shift. No visitors present today.   A: scant liquid stool since yesterday. Continued pain and activity intolerance. RUE > LUE.  P: Continue IV immunosuppressants per orders, monitor labs. Continue PPN as ordered. Encourage increased activity and participation in cares. Encourage PO full liquid intake. Continue current cares and notify providers with questions or concerns.

## 2018-08-23 NOTE — PROGRESS NOTES
Cardiology 2 Progress Note           Assessment and Plan:   Murray Nicholson is a 63 year old male with a h/o NICM s/p heart txp (6/14/18), ESRD on HD, R internal jugular thrombus on apixaban and DM2 p/w 3-4 bright red BM and fevers/chills, found to have a daniella-colonic abscess (7.8 cm), daniella-rectal abscess (2.7 cm), probable diverticulitis, and R colon colitis.    Changes today:  - POD#11 for laparoscopic assisted sigmoid colectomy with end colostomy  - Hgb low (6.9 from 7.7); transfusing this AM  - Holding heparin gtt  - Transition to PO abx today  - Tacro gtt, target level 7-8  - Follow up tac level today  - RHC/bx today    # Daniella-colonic abscess (7.8 cm) s/p laparoscopic assisted sigmoid colectomy with end colostomy 8/14) positive for VRE  # Daniella-rectal abscess (2.7 cm) s/p drainage 8/12  # R colon colitis  # Positive c. Diff  Tolerating PO intake, will advance diet today.  Stopping PPN today.  - Start cipro, PO linezolid, PO flagyl  - Discontinue Cefepime, IV flagyl   - Continue PO vanc  - Change ganciclovir to valcyte tomorrow  - Miralax daily  - Acetaminophen, tramadol PRN pain  - Colorectal, transplant ID following, appreciate assistance    # Recent R internal jugular line-associated thrombus  # Acute anemia   Transfused 1u PRBC overnight for Hgb 6.9.  Holding heparin gtt in setting of falling Hgb.  - Hold heparin gtt  - Trend Hgb    # S/p heart transplant 6/13/2018 (donor 3 vessel CAD  HSV1-, HSV1+, CMV D+/R-, EBV D?/R+, VZV+  tacro goal 7-8 due to infx  Transition to oral immunosuppression today; keeping tacro IV for now.  - Tacro gtt  - Lowered prednisone to 10/10 this admit  - Continue MMF  - Continue statin  - Received pentamadine 8/17   - Continue nystatin swish  - Planned for RHC/bx tomorrow    # Recent pseudomonas bacteremia  # Necrotic mouth ulcer, resolving  Had profound neutropenia and pseudomonas bacteremia several weeks ago in associated with necrotic mouth ulcer; neutropenia likely  immunosuppression induced.  Neutropenia resolved last admission and pseudomonal bacteremia is likely cleared.  Necrotic ulcer appears to be resolving from prior admission.  Not currently active issues but will continue to monitor.    # Pulmonary nodules  Incidental on CT, will need interval follow up in 4-6 weeks.    # ESRD on TTS HD  - Nephrology consulted, appreciate assistance    # DM2   - Glargine, medium SSI    FEN: Advance as tolerated  PPx: Holding heparin  Dispo: PT/OT to see when appropriate    Bobby Bearden MD  Internal Medicine PGY-3  [phone]    Patient discussed with Dr. Ernst.       Subjective:   Overnight required 1u PRBC.  This AM feels well, has not noted any bleeding.  Thinks he had slightly more ostomy output overnight.          Medications:       acetaminophen  1,000 mg Oral 4x Daily     ceFEPIme (MAXIPIME) IV  1 g Intravenous Q24H     fluticasone  1-2 spray Both Nostrils Daily     ganciclovir (CYTOVENE) intermittent infusion  0.625 mg/kg (Ideal) Intravenous Once per day on Tue Thu Sat     gelatin absorbable  1 each Topical During Hemodialysis (from stock)     hydrALAZINE  75 mg Oral 4x Daily     insulin aspart  1-6 Units Subcutaneous Q4H     insulin glargine  10 Units Subcutaneous QAM AC     linezolid  600 mg Intravenous Q12H     lipids  250 mL Intravenous Once per day on Sun Mon Tue Wed Thu     methylPREDNISolone  8 mg Intravenous Q12H     metroNIDAZOLE  500 mg Intravenous Q8H     mycophenolate  500 mg Intravenous Q12H     nystatin  1,000,000 Units Swish & Swallow 4x Daily     pantoprazole  40 mg Intravenous Q24H     polyethylene glycol  17 g Oral Daily     sodium chloride (PF)  3 mL Intracatheter During Hemodialysis (from stock)     sodium chloride (PF)  3 mL Intracatheter During Hemodialysis (from stock)     sodium chloride (PF)  3 mL Intracatheter Q8H     sodium chloride (PF)  3 mL Intracatheter Q8H     triamcinolone   Topical BID     vancomycin  125 mg Oral 4x Daily       IV fluid  REPLACEMENT ONLY       IV fluid REPLACEMENT ONLY       parenteral nutrition - ADULT compounded formula 40 mL/hr at 08/22/18 1916     tacrolimus (Prograf) infusion ADULT 42 mcg/hr (08/22/18 1703)               Objective:          Physical Exam:   Temp:  [97.8  F (36.6  C)-98.5  F (36.9  C)] 98.2  F (36.8  C)  Heart Rate:  [86-97] 92  Resp:  [18-20] 18  BP: ()/() 127/85  Cuff Mean (mmHg):  [115-124] 115  SpO2:  [100 %] 100 %  I/O last 3 completed shifts:  In: 2588.93 [P.O.:660; I.V.:610]  Out: 0     Vitals:    08/19/18 0634 08/20/18 0841 08/21/18 1014 08/22/18 0600   Weight: 79.3 kg (174 lb 13.2 oz) 80 kg (176 lb 5.9 oz) 80.5 kg (177 lb 7.5 oz) 80.1 kg (176 lb 9.4 oz)    08/23/18 0631   Weight: 83.5 kg (184 lb 1.4 oz)     GEN: Laying in bed, NAD  PULM: CTAB from anterior exam  CV: Regular rate and rhythm.  JVP not elevated   ABD: Colostomy site without surrounding redness.  Soft dark stool in ostomy bag.  Incision C/D/I.  Hyperactive bowel sounds.  Mild diffuse tenderness to palpation in lower quadrants improved from prior exam.  EXT: Trace lower extremity edema         Data:   BMP    Recent Labs  Lab 08/23/18  0620 08/22/18  0741 08/21/18  0633 08/20/18  1105   * 128* 128* 134   POTASSIUM 3.6 3.3* 3.8 3.6   CHLORIDE 90* 93* 94 98   TRINI 7.4* 7.3* 7.8* 8.8   CO2 23 24 23 26   BUN 65* 46* 45* 21   CR 4.25* 3.22* 3.66* 2.20*   * 256* 294* 209*     LFTs    Recent Labs  Lab 08/20/18  1105   ALKPHOS 170*   AST 41   ALT 18   BILITOTAL 0.4   PROTTOTAL 6.1*   ALBUMIN 1.7*      CBC    Recent Labs  Lab 08/23/18  0620  08/22/18  0741 08/21/18  2120  08/21/18  0633   WBC 5.5  --  7.2 6.0  --  6.1   RBC 2.20*  --  2.63* 2.34*  --  2.55*   HGB 6.9*  < > 7.9* 6.9*  < > 7.4*   HCT 19.4*  --  23.5* 20.3*  --  22.3*   MCV 88  --  89 87  --  88   MCH 31.4  --  30.0 29.5  --  29.0   MCHC 35.6  --  33.6 34.0  --  33.2   RDW 18.0*  --  17.9* 18.3*  --  18.4*   *  --  119* 118*  --  136*   < > = values in this  interval not displayed.  INR    Recent Labs  Lab 08/20/18  1105   INR 0.99

## 2018-08-24 ENCOUNTER — APPOINTMENT (OUTPATIENT)
Dept: CARDIOLOGY | Facility: CLINIC | Age: 63
DRG: 329 | End: 2018-08-24
Attending: STUDENT IN AN ORGANIZED HEALTH CARE EDUCATION/TRAINING PROGRAM
Payer: MEDICARE

## 2018-08-24 ENCOUNTER — APPOINTMENT (OUTPATIENT)
Dept: PHYSICAL THERAPY | Facility: CLINIC | Age: 63
DRG: 329 | End: 2018-08-24
Payer: MEDICARE

## 2018-08-24 LAB
ANION GAP SERPL CALCULATED.3IONS-SCNC: 11 MMOL/L (ref 3–14)
BASOPHILS # BLD AUTO: 0 10E9/L (ref 0–0.2)
BASOPHILS # BLD AUTO: 0 10E9/L (ref 0–0.2)
BASOPHILS NFR BLD AUTO: 0 %
BASOPHILS NFR BLD AUTO: 0 %
BUN SERPL-MCNC: 49 MG/DL (ref 7–30)
CALCIUM SERPL-MCNC: 7.9 MG/DL (ref 8.5–10.1)
CHLORIDE SERPL-SCNC: 91 MMOL/L (ref 94–109)
CO2 SERPL-SCNC: 24 MMOL/L (ref 20–32)
CREAT SERPL-MCNC: 3.36 MG/DL (ref 0.66–1.25)
DIFFERENTIAL METHOD BLD: ABNORMAL
DIFFERENTIAL METHOD BLD: ABNORMAL
EOSINOPHIL # BLD AUTO: 0 10E9/L (ref 0–0.7)
EOSINOPHIL # BLD AUTO: 0 10E9/L (ref 0–0.7)
EOSINOPHIL NFR BLD AUTO: 0.2 %
EOSINOPHIL NFR BLD AUTO: 0.2 %
ERYTHROCYTE [DISTWIDTH] IN BLOOD BY AUTOMATED COUNT: 17.7 % (ref 10–15)
ERYTHROCYTE [DISTWIDTH] IN BLOOD BY AUTOMATED COUNT: 18.2 % (ref 10–15)
GFR SERPL CREATININE-BSD FRML MDRD: 19 ML/MIN/1.7M2
GLUCOSE SERPL-MCNC: 202 MG/DL (ref 70–99)
HCT VFR BLD AUTO: 22.7 % (ref 40–53)
HCT VFR BLD AUTO: 23.9 % (ref 40–53)
HGB BLD-MCNC: 7.6 G/DL (ref 13.3–17.7)
HGB BLD-MCNC: 7.9 G/DL (ref 13.3–17.7)
HGB BLD-MCNC: 8 G/DL (ref 13.3–17.7)
IMM GRANULOCYTES # BLD: 0 10E9/L (ref 0–0.4)
IMM GRANULOCYTES # BLD: 0 10E9/L (ref 0–0.4)
IMM GRANULOCYTES NFR BLD: 0.3 %
IMM GRANULOCYTES NFR BLD: 0.5 %
LYMPHOCYTES # BLD AUTO: 0.2 10E9/L (ref 0.8–5.3)
LYMPHOCYTES # BLD AUTO: 0.3 10E9/L (ref 0.8–5.3)
LYMPHOCYTES NFR BLD AUTO: 3.8 %
LYMPHOCYTES NFR BLD AUTO: 4.6 %
MAGNESIUM SERPL-MCNC: 1.8 MG/DL (ref 1.6–2.3)
MCH RBC QN AUTO: 30.3 PG (ref 26.5–33)
MCH RBC QN AUTO: 30.8 PG (ref 26.5–33)
MCHC RBC AUTO-ENTMCNC: 33.1 G/DL (ref 31.5–36.5)
MCHC RBC AUTO-ENTMCNC: 33.5 G/DL (ref 31.5–36.5)
MCV RBC AUTO: 92 FL (ref 78–100)
MCV RBC AUTO: 92 FL (ref 78–100)
MONOCYTES # BLD AUTO: 0.2 10E9/L (ref 0–1.3)
MONOCYTES # BLD AUTO: 0.4 10E9/L (ref 0–1.3)
MONOCYTES NFR BLD AUTO: 2.7 %
MONOCYTES NFR BLD AUTO: 7.7 %
NEUTROPHILS # BLD AUTO: 4.9 10E9/L (ref 1.6–8.3)
NEUTROPHILS # BLD AUTO: 6.1 10E9/L (ref 1.6–8.3)
NEUTROPHILS NFR BLD AUTO: 87.8 %
NEUTROPHILS NFR BLD AUTO: 92.2 %
NRBC # BLD AUTO: 0 10*3/UL
NRBC # BLD AUTO: 0 10*3/UL
NRBC BLD AUTO-RTO: 0 /100
NRBC BLD AUTO-RTO: 0 /100
PHOSPHATE SERPL-MCNC: 3.6 MG/DL (ref 2.5–4.5)
PLATELET # BLD AUTO: 78 10E9/L (ref 150–450)
PLATELET # BLD AUTO: 78 10E9/L (ref 150–450)
PLATELET # BLD EST: ABNORMAL 10*3/UL
POTASSIUM SERPL-SCNC: 3.9 MMOL/L (ref 3.4–5.3)
RBC # BLD AUTO: 2.47 10E12/L (ref 4.4–5.9)
RBC # BLD AUTO: 2.61 10E12/L (ref 4.4–5.9)
SODIUM SERPL-SCNC: 127 MMOL/L (ref 133–144)
TACROLIMUS BLD-MCNC: 6.7 UG/L (ref 5–15)
TME LAST DOSE: NORMAL H
WBC # BLD AUTO: 5.6 10E9/L (ref 4–11)
WBC # BLD AUTO: 6.6 10E9/L (ref 4–11)

## 2018-08-24 PROCEDURE — A9270 NON-COVERED ITEM OR SERVICE: HCPCS | Mod: GY | Performed by: INTERNAL MEDICINE

## 2018-08-24 PROCEDURE — 25000131 ZZH RX MED GY IP 250 OP 636 PS 637: Mod: GY | Performed by: INTERNAL MEDICINE

## 2018-08-24 PROCEDURE — 27211047 ZZH FORCEP BIOPSY CR10

## 2018-08-24 PROCEDURE — 21400003 ZZH R&B CCU CRITICAL UMMC

## 2018-08-24 PROCEDURE — 25000132 ZZH RX MED GY IP 250 OP 250 PS 637: Mod: GY

## 2018-08-24 PROCEDURE — 93505 ENDOMYOCARDIAL BIOPSY: CPT | Mod: 26 | Performed by: INTERNAL MEDICINE

## 2018-08-24 PROCEDURE — A9270 NON-COVERED ITEM OR SERVICE: HCPCS | Mod: GY

## 2018-08-24 PROCEDURE — 02BK3ZX EXCISION OF RIGHT VENTRICLE, PERCUTANEOUS APPROACH, DIAGNOSTIC: ICD-10-PCS | Performed by: INTERNAL MEDICINE

## 2018-08-24 PROCEDURE — 25000132 ZZH RX MED GY IP 250 OP 250 PS 637: Mod: GY | Performed by: STUDENT IN AN ORGANIZED HEALTH CARE EDUCATION/TRAINING PROGRAM

## 2018-08-24 PROCEDURE — 27210787 ZZH MANIFOLD CR2

## 2018-08-24 PROCEDURE — 27211181 ZZH BALLOON TIP PRESSURE CR5

## 2018-08-24 PROCEDURE — 25000132 ZZH RX MED GY IP 250 OP 250 PS 637: Mod: GY | Performed by: INTERNAL MEDICINE

## 2018-08-24 PROCEDURE — 40000903 ZZH STATISTIC WOC PT EDUCATION, 31-45 MIN

## 2018-08-24 PROCEDURE — 36415 COLL VENOUS BLD VENIPUNCTURE: CPT | Performed by: COLON & RECTAL SURGERY

## 2018-08-24 PROCEDURE — 85025 COMPLETE CBC W/AUTO DIFF WBC: CPT | Performed by: COLON & RECTAL SURGERY

## 2018-08-24 PROCEDURE — A9270 NON-COVERED ITEM OR SERVICE: HCPCS | Mod: GY | Performed by: COLON & RECTAL SURGERY

## 2018-08-24 PROCEDURE — 80048 BASIC METABOLIC PNL TOTAL CA: CPT | Performed by: COLON & RECTAL SURGERY

## 2018-08-24 PROCEDURE — 97530 THERAPEUTIC ACTIVITIES: CPT | Mod: GP

## 2018-08-24 PROCEDURE — 40000193 ZZH STATISTIC PT WARD VISIT

## 2018-08-24 PROCEDURE — C1893 INTRO/SHEATH, FIXED,NON-PEEL: HCPCS

## 2018-08-24 PROCEDURE — 25000128 H RX IP 250 OP 636: Performed by: STUDENT IN AN ORGANIZED HEALTH CARE EDUCATION/TRAINING PROGRAM

## 2018-08-24 PROCEDURE — 84100 ASSAY OF PHOSPHORUS: CPT | Performed by: COLON & RECTAL SURGERY

## 2018-08-24 PROCEDURE — 93505 ENDOMYOCARDIAL BIOPSY: CPT

## 2018-08-24 PROCEDURE — 27210982 ZZH KIT RT HC TOTES DISP CR7

## 2018-08-24 PROCEDURE — 88346 IMFLUOR 1ST 1ANTB STAIN PX: CPT | Performed by: INTERNAL MEDICINE

## 2018-08-24 PROCEDURE — 97116 GAIT TRAINING THERAPY: CPT | Mod: GP

## 2018-08-24 PROCEDURE — 99233 SBSQ HOSP IP/OBS HIGH 50: CPT | Mod: 25 | Performed by: INTERNAL MEDICINE

## 2018-08-24 PROCEDURE — 25000131 ZZH RX MED GY IP 250 OP 636 PS 637: Mod: GY | Performed by: STUDENT IN AN ORGANIZED HEALTH CARE EDUCATION/TRAINING PROGRAM

## 2018-08-24 PROCEDURE — 25000132 ZZH RX MED GY IP 250 OP 250 PS 637: Mod: GY | Performed by: COLON & RECTAL SURGERY

## 2018-08-24 PROCEDURE — 4A023N6 MEASUREMENT OF CARDIAC SAMPLING AND PRESSURE, RIGHT HEART, PERCUTANEOUS APPROACH: ICD-10-PCS | Performed by: INTERNAL MEDICINE

## 2018-08-24 PROCEDURE — 88307 TISSUE EXAM BY PATHOLOGIST: CPT | Performed by: INTERNAL MEDICINE

## 2018-08-24 PROCEDURE — 80197 ASSAY OF TACROLIMUS: CPT

## 2018-08-24 PROCEDURE — 83735 ASSAY OF MAGNESIUM: CPT | Performed by: COLON & RECTAL SURGERY

## 2018-08-24 PROCEDURE — A9270 NON-COVERED ITEM OR SERVICE: HCPCS | Mod: GY | Performed by: STUDENT IN AN ORGANIZED HEALTH CARE EDUCATION/TRAINING PROGRAM

## 2018-08-24 PROCEDURE — 88350 IMFLUOR EA ADDL 1ANTB STN PX: CPT | Performed by: INTERNAL MEDICINE

## 2018-08-24 PROCEDURE — 85025 COMPLETE CBC W/AUTO DIFF WBC: CPT

## 2018-08-24 PROCEDURE — 25000125 ZZHC RX 250: Performed by: STUDENT IN AN ORGANIZED HEALTH CARE EDUCATION/TRAINING PROGRAM

## 2018-08-24 RX ORDER — BETA-CAROTENE 7500 MCG
25000 CAPSULE ORAL 2 TIMES DAILY
Status: DISCONTINUED | OUTPATIENT
Start: 2018-08-24 | End: 2018-08-27

## 2018-08-24 RX ORDER — PANTOPRAZOLE SODIUM 40 MG/1
40 TABLET, DELAYED RELEASE ORAL
Status: DISCONTINUED | OUTPATIENT
Start: 2018-08-24 | End: 2018-08-25

## 2018-08-24 RX ORDER — PANTOPRAZOLE SODIUM 40 MG/1
40 TABLET, DELAYED RELEASE ORAL
Status: DISCONTINUED | OUTPATIENT
Start: 2018-08-24 | End: 2018-08-24

## 2018-08-24 RX ORDER — SACCHAROMYCES BOULARDII 250 MG
250 CAPSULE ORAL 2 TIMES DAILY
Status: DISCONTINUED | OUTPATIENT
Start: 2018-08-24 | End: 2018-08-24

## 2018-08-24 RX ORDER — VALGANCICLOVIR 450 MG/1
450 TABLET, FILM COATED ORAL
Status: DISCONTINUED | OUTPATIENT
Start: 2018-08-25 | End: 2018-09-05

## 2018-08-24 RX ADMIN — PANTOPRAZOLE SODIUM 40 MG: 40 TABLET, DELAYED RELEASE ORAL at 09:53

## 2018-08-24 RX ADMIN — PREDNISONE 10 MG: 10 TABLET ORAL at 17:23

## 2018-08-24 RX ADMIN — HYDRALAZINE HYDROCHLORIDE 75 MG: 50 TABLET ORAL at 22:33

## 2018-08-24 RX ADMIN — LINEZOLID 600 MG: 600 TABLET, FILM COATED ORAL at 08:17

## 2018-08-24 RX ADMIN — MYCOPHENOLATE MOFETIL 500 MG: 250 CAPSULE ORAL at 20:55

## 2018-08-24 RX ADMIN — NYSTATIN 1000000 UNITS: 100000 SUSPENSION ORAL at 20:55

## 2018-08-24 RX ADMIN — INSULIN ASPART 3 UNITS: 100 INJECTION, SOLUTION INTRAVENOUS; SUBCUTANEOUS at 00:49

## 2018-08-24 RX ADMIN — TACROLIMUS 84 MCG/HR: 5 INJECTION, SOLUTION INTRAVENOUS at 00:38

## 2018-08-24 RX ADMIN — ACETAMINOPHEN 1000 MG: 500 TABLET, FILM COATED ORAL at 17:23

## 2018-08-24 RX ADMIN — HYDRALAZINE HYDROCHLORIDE 75 MG: 50 TABLET ORAL at 17:23

## 2018-08-24 RX ADMIN — Medication 250 MG: at 10:00

## 2018-08-24 RX ADMIN — VANCOMYCIN HYDROCHLORIDE 125 MG: KIT at 13:37

## 2018-08-24 RX ADMIN — ACETAMINOPHEN 1000 MG: 500 TABLET, FILM COATED ORAL at 13:36

## 2018-08-24 RX ADMIN — Medication 1 CAPSULE: at 17:23

## 2018-08-24 RX ADMIN — ACETAMINOPHEN 1000 MG: 500 TABLET, FILM COATED ORAL at 09:53

## 2018-08-24 RX ADMIN — MYCOPHENOLATE MOFETIL 500 MG: 250 CAPSULE ORAL at 08:17

## 2018-08-24 RX ADMIN — METRONIDAZOLE 500 MG: 500 TABLET ORAL at 13:37

## 2018-08-24 RX ADMIN — VANCOMYCIN HYDROCHLORIDE 125 MG: KIT at 22:33

## 2018-08-24 RX ADMIN — METRONIDAZOLE 500 MG: 500 TABLET ORAL at 22:32

## 2018-08-24 RX ADMIN — CIPROFLOXACIN HYDROCHLORIDE 500 MG: 250 TABLET, FILM COATED ORAL at 17:23

## 2018-08-24 RX ADMIN — ACETAMINOPHEN 1000 MG: 500 TABLET, FILM COATED ORAL at 22:32

## 2018-08-24 RX ADMIN — NYSTATIN 1000000 UNITS: 100000 SUSPENSION ORAL at 13:07

## 2018-08-24 RX ADMIN — METRONIDAZOLE 500 MG: 500 TABLET ORAL at 06:04

## 2018-08-24 RX ADMIN — Medication 25000 UNITS: at 10:00

## 2018-08-24 RX ADMIN — NYSTATIN 1000000 UNITS: 100000 SUSPENSION ORAL at 08:17

## 2018-08-24 RX ADMIN — INSULIN ASPART 2 UNITS: 100 INJECTION, SOLUTION INTRAVENOUS; SUBCUTANEOUS at 06:04

## 2018-08-24 RX ADMIN — INSULIN ASPART 2 UNITS: 100 INJECTION, SOLUTION INTRAVENOUS; SUBCUTANEOUS at 18:04

## 2018-08-24 RX ADMIN — VANCOMYCIN HYDROCHLORIDE 125 MG: KIT at 09:55

## 2018-08-24 RX ADMIN — LINEZOLID 600 MG: 600 TABLET, FILM COATED ORAL at 20:55

## 2018-08-24 RX ADMIN — VANCOMYCIN HYDROCHLORIDE 125 MG: KIT at 17:27

## 2018-08-24 RX ADMIN — PREDNISONE 10 MG: 10 TABLET ORAL at 08:17

## 2018-08-24 RX ADMIN — HYDRALAZINE HYDROCHLORIDE 75 MG: 50 TABLET ORAL at 08:17

## 2018-08-24 RX ADMIN — PANTOPRAZOLE SODIUM 40 MG: 40 TABLET, DELAYED RELEASE ORAL at 17:23

## 2018-08-24 RX ADMIN — Medication 25000 UNITS: at 20:59

## 2018-08-24 RX ADMIN — Medication 50 MG: at 22:32

## 2018-08-24 RX ADMIN — HYDRALAZINE HYDROCHLORIDE 75 MG: 50 TABLET ORAL at 13:06

## 2018-08-24 RX ADMIN — NYSTATIN 1000000 UNITS: 100000 SUSPENSION ORAL at 17:27

## 2018-08-24 ASSESSMENT — ACTIVITIES OF DAILY LIVING (ADL)
ADLS_ACUITY_SCORE: 12

## 2018-08-24 ASSESSMENT — PAIN DESCRIPTION - DESCRIPTORS
DESCRIPTORS: ACHING
DESCRIPTORS: ACHING
DESCRIPTORS: DULL;ACHING

## 2018-08-24 NOTE — PROGRESS NOTES
"Colorectal Surgery Progress Note  POD#12    Subjective:  Went for dialysis yesterday, received 1U PRBC during dialysis. Appears he did not get low residue diet yesterday, and would like to eat pancakes this AM. No acute events overnight. No nausea or vomiting, pain well controlled. Feeling \"ok.\" Disappointed to have missed WOCN yesterday while resting and hoping to see today.    Vitals:  Vitals:    08/23/18 1609 08/23/18 2126 08/23/18 2132 08/24/18 0045   BP: 131/79 131/79  (!) 139/91   BP Location: Left arm Left arm  Left arm   Pulse:       Resp: 18 18 18 16   Temp: 98.5  F (36.9  C) 98.7  F (37.1  C)  98.6  F (37  C)   TempSrc: Oral Oral  Oral   SpO2: 100% 100%  100%   Weight:       Height:           I/O:  I/O last 3 completed shifts:  In: 1347.91 [P.O.:750; I.V.:217.91]  Out: 4100 [Urine:150; Other:3500; Stool:450]    Physical Exam:  Gen: AAOx3, NAD  Pulm: Non-labored breathing  Abd: Soft, non-distended, non-tender   Incision C/D/I with dermabond    Stoma pink and viable with dark green-dahila stool, liquid, and gas in bag  Ext:  Warm and well-perfused    BMP    Recent Labs  Lab 08/23/18  0620 08/22/18  0741 08/21/18  0633 08/20/18  1105 08/19/18  0617   * 128* 128* 134 132*   POTASSIUM 3.6 3.3* 3.8 3.6 4.1   CHLORIDE 90* 93* 94 98 97   CO2 23 24 23 26 25   BUN 65* 46* 45* 21 34*   CR 4.25* 3.22* 3.66* 2.20* 3.65*   * 256* 294* 209* 207*   MAG 2.1 1.8 2.2 1.7 1.9   PHOS  --  2.7 2.9 1.5* 3.1     CBC  Recent Labs  Lab 08/23/18  1654 08/23/18  0620 08/22/18  1807 08/22/18  0741 08/21/18  2120  08/21/18  0633   WBC  --  5.5  --  7.2 6.0  --  6.1   HGB 8.9* 6.9* 7.7* 7.9* 6.9*  < > 7.4*   HCT  --  19.4*  --  23.5* 20.3*  --  22.3*   PLT  --  108*  --  119* 118*  --  136*   < > = values in this interval not displayed.      ASSESSMENT: This is a 63 year old male with NICM s/p heart transplant 6/2018 on immunosuppressants, with ESRD on HD, R internal jugular thrombus previously on apixaban, T2DM, hx " pseudomonal bacteremia admitted to cards service for painless BRBPR. Found to have diverticulitis with 7.8 cm pericolonic abscess without safe window for IR drain. 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 Post-op course complicated by ileus, and concern for bleeding with slowly down-trending Hgb. Ileus now resolved with return of bowel function. Hgb drifting likely related to dialysis, heparin gtt stopped, has received transfusion 1 U PRBC x 2 since 8/21.   Appreciate care per primary team.     - Continue low residue diet, TPN d/c'd 8/23, will follow calorie counts  - Miralax every day  - Replete lytes to Mg >2, Phos >3, K >4      Bird Fairchild MD  General Surgery PGY-1  Colon and Rectal Surgery Service  [phone]    Patient was seen with team and discussed with staff

## 2018-08-24 NOTE — PROGRESS NOTES
[facility]  Transplant Infectious Disease Progress Note     Patient:  Murray Nicholson, Date of birth 1955, Medical record number [id number]  Date of Visit:  08/24/2018         Assessment and Recommendations:   Recommendations:  - Continue PO ciprofloxacin 500 mg PO daily (HD dosing), Linezolid 600mg PO BID and metronidazole 500mg PO TID.  Would plan to complete 2 week course from last OR (8/14-8/27)  - Continue treatment for C difficile with PO vancomycin for 10 days following cessation of broad-spectrum coverage as he is at risk of recurrence while still on broad therapy.  - Continue ganciclovir at prophylaxis dosing of 0.625mg/kg three times weekly (MWF).  Should continue prophylactic dosing until 3 months post transplant (9/14/18). This could be changed back to valganciclovr with return of bowel function.  - Continue pentamidine for PJP prophylaxis.  If he has stable counts moving forward, would be reasonable to re-assess bactrim.  - For small pulmonary nodules, we would recommend sooner interval imaging in transplant patient with repeat CT in 4-6 weeks.    Please call with questions.      Assessment:   63-year-old gentleman with history of ischemic/valvular cardiomyopathy s/p OHT on 6/14/18 (induction with solumedrol and maintenance with tacrolimus, MMF, and prednisone), ESRD on TTa hemodialysis currently listed for kidney transplantation, history of polymicrobial peritonitis with Candida glabrata when previously on peritoneal dialysis in 1/2018, recent Pseudomonas aeruginosa bacteremia on 7/26/18 with retained dialysis catheter, hypertension, hyperlipidemia, and type 2 diabetes who was admitted on 8/12 with subjective fevers, severe lower abdominal pain, and bloody stools and now ow C diff positive.  He is s/p laparoscopic abscess drainage, sigmoidectomy, and partial small bowel resection on 8/14, with polymicrobial operative cultures growing Citrobacter freundii complex, VRE,  Klebsiella pneumoniae, pseudomonas aeruginosa and Veillonella.  Murray continues to make progress, remains afebrile, some bloody stools in the colostomy bag.  Teams working w/ GI in re-assessing this issue.      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  Throughout this time period, he had been on broad-spectrum coverage with cefepime, which should have suppressed many enteric organisms, though with holes in anaerobic coverage, that could explain progression of symptoms. Underlying lesions seems likely to be related to diverticulosis.  Taken to the OR on 8/14/18 for laparoscopic drainage of pericolonic and perirectal abscesses, as well as sigmoidectomy, end colostomy, and partial small bowel resection. Fluid cultures obtained in the OR (8/14/18) have since grown  Citrobacter freundii complex, VRE, Klebsiella pneumoniae, pseudomonas aeruginosa and Veillonella. Patient has remained afebrile in post-op period, with subjective improvement in abdominal pain. Remains appropriate to continue IV cefepime, linezolid and flagyl for treatment of polymicrobial infection while patient is NPO.  His course has been complicated by post-op ileus which has been slow to resolved.  Small bowel enhancement seen on CT likely related to known small bowel resection.      # C diff positive diarrhea:  Commenced therapy with oral vancomycin on 8/13/18.     #  Pseudomonas aeruginosa bacteremia associated with a hemodialysis catheter that was retained: History of positive blood cultures from 7/26 but by report, there have not been more recent positive cultures  since then.  There is certainly a risk that he may have recrudescence of bacteremia as result of biofilm formation on the line that was retained.  However, at present, the cefepime would be suppressing growth as he has not been off antibiotics for any period of time.  He has not had evidence of breakthrough bacteremia as assessed by blood cultures from 8/10 as well as more recent blood cultures drawn during this admission.  Because of his intra-abdominal abscesses, we expect that he will continue to be on an agent active against Pseudomonas for some time.     # New small (3mm and 5mm) pulmonary nodules: Would repeat CT chest in 4-6 weeks.    # Oral ulcer: Viral testing during last admission was negative.  Wound culture grew Pseudomonas in addition to oral tiffany. Lesion appears to have decreased in size since earlier in hospitalization.     Other ID issues:  - Viral serostatus & prophylaxis: HSV1-, HSV2+, CMV D+/R-, EBV D?/R+, VZV+.  Ganciclovir until tolerating PO.  - PJP: TMP-SMX  - Isolation status: Enteric for C difficile.    Attestation:  I have reviewed today's vital signs, medications, labs and imaging.      Floor time: 25 minutes, Face-to-face time: 10 minutes, Total time: 35 minutes  Rahul Blank,   Pager [phone]          Interval History:     Murray continues to do well overall, no cough / sob, hypoxemia, no fevers or chills, no abdominal pain or new rashes.  Some bloody stool from the colostomy w/ possible plans for further evaluation from GI.     Transplants:  6/14/2018 (Heart), Postoperative day:  71.  Coordinator Britni Mcknight    Immunosuppression: Mycophenolate 500 mg twice daily, tacrolimus 1 mg every morning and 2 mg every afternoon prior trough goal had been 10-12 but currently targeting 8.  Solumedrol 8mg q8h         Current Medications & Allergies:       acetaminophen  1,000 mg Oral 4x Daily     beta carotene  25,000 Units Oral BID     ciprofloxacin  500 mg Oral Q24H JEAN      fluticasone  1-2 spray Both Nostrils Daily     ganciclovir (CYTOVENE) intermittent infusion  0.625 mg/kg (Ideal) Intravenous Once per day on Tue Thu Sat     hydrALAZINE  75 mg Oral 4x Daily     insulin aspart  1-6 Units Subcutaneous TID w/meals     insulin glargine  15 Units Subcutaneous QAM AC     linezolid  600 mg Oral Q12H JEAN     lipids  250 mL Intravenous Once per day on Sun Mon Tue Wed Thu     metroNIDAZOLE  500 mg Oral Q8H JEAN     mycophenolate  500 mg Oral BID     nystatin  1,000,000 Units Swish & Swallow 4x Daily     pantoprazole  40 mg Oral BID AC     predniSONE  10 mg Oral BID w/meals     saccharomyces boulardii  250 mg Oral BID     sodium chloride (PF)  3 mL Intracatheter Q8H     sodium chloride (PF)  3 mL Intracatheter Q8H     triamcinolone   Topical BID     vancomycin  125 mg Oral 4x Daily     Infusions/Drips:    IV fluid REPLACEMENT ONLY       IV fluid REPLACEMENT ONLY       tacrolimus (Prograf) infusion ADULT 84 mcg/hr (08/24/18 1001)     Allergies   Allergen Reactions     Norco [Hydrocodone-Acetaminophen] Nausea and Vomiting     Cats      Throat tightness     Isosorbide Other (See Comments)     hypotension     Penicillins Hives     Seasonal Allergies      rhinitis     Shrimp      Throat closes           Review of Systems:   As per Interval History.  Complete ROS is otherwise negative.         Physical Exam:   Vitals were reviewed and are stable.  Vital sign ranges over the past 24 hours:  Temp:  [97.8  F (36.6  C)-98.7  F (37.1  C)] 98  F (36.7  C)  Heart Rate:  [86-96] 90  Resp:  [16-18] 16  BP: (129-148)/() 129/87  Cuff Mean (mmHg):  [102-120] 106  SpO2:  [100 %] 100 %    Physical Examination:  GENERAL: Pleasant and cooperative, resting in bed. No acute distress. Comfortable on room air, working with instruction on changing the ostomy site bag, good fine motor skills.   EYES:  EOMI, anicteric sclerae  ENT:  Oral ulcer on hard palate, improving.   LUNGS:  Clear to auscultation  bilaterally  CARDIOVASCULAR:  Regular rate and rhythm with no murmur.  Well-healed surgical scars.    ABDOMEN: Increased bowel sounds.  Less tender.   LLQ colostomy with clean stoma, bag currently being changed.    SKIN:  No acute rash.  NEUROLOGIC:  Alert, oriented x3, good historian.         Laboratory Data:     CD4 counts  No results found for: ACD4  Inflammatory Markers    Recent Labs   Lab Test  08/17/18   0644  08/14/18   0620  08/12/18   0230  07/26/18   1732  07/02/18   0601  06/30/18   0852  07/05/17   1204  05/31/17   1111   01/13/16   1337   SED   --    --   72*   --    --    --    --    --    --   80*   CRP  270.0*  230.0*  76.0*  270.0*  7.3  12.0*  35.4*  15.9*   < >   --     < > = values in this interval not displayed.       Immune Globulin Studies    Recent Labs   Lab Test  05/19/15   1000   IGG  1380   IGM  108   IGA  203       Metabolic Studies       Recent Labs   Lab Test  08/24/18   0634  08/23/18   0620  08/22/18   0741  08/21/18   0633  08/20/18   1105  08/19/18   0617   08/17/18   0644   NA  127*  125*  128*  128*  134  132*   < >  134   POTASSIUM  3.9  3.6  3.3*  3.8  3.6  4.1   < >  4.3   CHLORIDE  91*  90*  93*  94  98  97   < >  100   CO2  24  23  24  23  26  25   < >  24   ANIONGAP  11  13  10  10  10  10   < >  10   BUN  49*  65*  46*  45*  21  34*   < >  28   CR  3.36*  4.25*  3.22*  3.66*  2.20*  3.65*   < >  4.07*   GFRESTIMATED  19*  14*  20*  17*  30*  17*   < >  15*   GLC  202*  276*  256*  294*  209*  207*   < >  156*   TRINI  7.9*  7.4*  7.3*  7.8*  8.8  7.9*   < >  8.2*   PHOS  3.6   --   2.7  2.9  1.5*  3.1   --   3.9   MAG  1.8  2.1  1.8  2.2  1.7  1.9   < >  1.9    < > = values in this interval not displayed.       Hepatic Studies    Recent Labs   Lab Test  08/20/18   1105  08/14/18   0620  08/13/18   0618  08/08/18   0921  07/29/18   0523  07/26/18   1732  07/18/18   0841   BILITOTAL  0.4  0.3  0.3  0.4  0.4  0.7  0.5   ALKPHOS  170*  110  99  156*  106  95  140   ALBUMIN   1.7*  1.6*  1.6*   --   1.9*  2.3*  2.9*   AST  41  29  28  34  26  18  14   ALT  18  17  17  22  21  19  24       Hematology Studies      Recent Labs   Lab Test  08/24/18   0634  08/23/18   1654  08/23/18   0620  08/22/18   1807  08/22/18   0741  08/21/18   2120   08/21/18   0633   08/20/18   1105   WBC  5.6   --   5.5   --   7.2  6.0   --   6.1   --   7.0   ANEU  4.9   --   4.9   --   6.6  5.0   --   5.6   --   6.6   ALYM  0.2*   --   0.3*   --   0.3*  0.6*   --   0.3*   --   0.2*   DK  0.4   --   0.2   --   0.2  0.2   --   0.2   --   0.2   AEOS  0.0   --   0.0   --   0.0  0.0   --   0.0   --   0.0   HGB  7.6*  8.9*  6.9*  7.7*  7.9*  6.9*   < >  7.4*   < >  9.1*   HCT  22.7*   --   19.4*   --   23.5*  20.3*   --   22.3*   --   27.9*   MCV  92   --   88   --   89  87   --   88   --   88   PLT  78*   --   108*   --   119*  118*   --   136*   --   157    < > = values in this interval not displayed.       Clotting Studies    Recent Labs   Lab Test  08/20/18   1105  08/12/18   0834  08/12/18   0230  07/26/18   1732  06/29/18   0545  06/28/18   0554  06/27/18   0629   INR  0.99  1.31*  1.25*  1.24*   --   1.15*  1.10   PTT   --   37   --    --   30  29  29       Pathology   8/14/18:   A. SMALL BOWEL WITH FISTULA, RESECTION:   - Small bowel wall with fistulous tract lined by inflamed granulation  tissue   - Margins viable   B. SIGMOID COLON, SIGMOIDECTOMY:   - Colonic wall with diverticulosis and associated perforations lined by  acute and chronic inflammation,  foreign body giant cell reaction and granulation tissue formation   - Margins viable        Microbiology  Abdominal abscess fluid    8/14 Light growth of citrobacter freundii complex, VRE, Klebsiella pneumoniae, and Pseudomonas aeruginosa. Veillonella, mixed tiffany    Blood                         6/14/18 dialysis catheter tip 2 strains CoNS                         7/26/18 x2 from [facility] Pseudomonas aeruginosa (Microbiology report obtained from [facility] and copy  placed in paper chart.  Pan-sensitive).                         7/26/18 x2 [facility] negative                         7/28/18 x2 negative                         8/10/18 ×2 negative                         18 ×3 negative     Wound                         18: Mouth wound with pansensitive pseudomonas aeruginosa and normal tiffany                         18 Mouth ulcer     Peritoneal fluid                         18 13 yellow fluid with 4736 WBCs, 80% neutrophils.  Gram stain with no organisms and many WBCs.  Culture with Candida glabrata and Bacteroides caccae.                         1/15/18: Peritoneal fluid with 4256 WBCs, 91% neutrophils.  Gram stain with many WBCs and no organisms seen.  Culture with Staphylococcus epidermidis, Candida glabrata    Enteric  Enteric LOGAN   18 negative  Giardia ag   18 negative  C diff   18 positive  Cryptosporidium ag   18 pending  Microsporidium ag   18 pending     Virology  CMV blood PCR                         18 negative                         18 negative  EBV blood PCR                         18 negative  Parvovirus blood PCR                          18 negative  VZV blood PCR                         18 negative     Imagin/20 KUB  1. NG/OG tube with its tip and sidehole projecting over the stomach.  2. Stable gaseous distention off the, an loops of large and small bowel bowel.     AXR  1. Enteric tube tip and sidehole project over the proximal stomach.  2. Nonspecific bowel gas pattern with gaseous distention of bowel loops in the central abdomen and right lower quadrant.     CT abd pelvis  1.  Postsurgical changes of partial sigmoidectomy, partial small bowel resection and left lower quadrant colostomy. Small amount of free air and ascites, potentially postsurgical.  2.  Dilated loops of bowel potentially representing ileus or mild partial obstruction.  3.  1.5 cm area of focal apparent enhancement in  the small bowel the left lower quadrant. This is indeterminate, though follow-up is warranted as it may represent a small enhancing mass. Consider outpatient follow-up CT enterography or MR enterography.  4. Small left pleural effusion. Lung base pulmonary nodules again seen, unchanged from 8/12/2026. Consider follow-up imaging in 3 months.

## 2018-08-24 NOTE — PLAN OF CARE
Problem: Patient Care Overview  Goal: Plan of Care/Patient Progress Review  Discharge Planner PT   Patient plan for discharge: TCU  Current status: patient needs [facility] for transfers. Ambulates ~100 feet with standing breaks and use of FWW. Patient able to stand for ~12 minutes.  Barriers to return to prior living situation: level of assist, medical stability  Recommendations for discharge: TCU  Rationale for recommendations: pt would benefit from TCU to progress safety and indep with functional mobility       Entered by: Scarlet Tavarez 08/24/2018 11:12 AM

## 2018-08-24 NOTE — PROCEDURES
PRELIMINARY REPORT:     PROCEDURES:   1. Right heart catheterization  2. Endomyocardial biopsy    PHYSICIANS:  1. Attending Interventional Cardiology Staff: Gilberto Mccann MD  2. Interventional Cardiology Fellow: Miah Mack MD     HPI:  Murray Nicholson is a 63 year old male who underwent OHT 6/14/18 who presents on an urgent inpatient basis for hemodynamic assessment and endomyocardial biopsy.     DESCRIPTION:   Venous Location: left internal jugular vein  Access: Local anesthetic with lidocaine.  A standard (18 g) needle with ultrasound guidance was used to establish venous access.  Venous Sheath:7F standard sheath, 7Fr Daig sheath  Catheters:  7F [place] Jax PA catheter, 5.5Fr Jaws bioptome    Right Heart Catheterization:  /80/103  HR 92  BSA 2.0    RA 2/1/1   RV 24/1  PA 24/14/17   PCW 5/4/3   PA sat 65.4%  PCW sat 93%   Hgb 7.0 g/dL   Kassi CO 7.5   Kassi CI 3.8   TD CO 6.0   TD CI 3.0   PVR 1.9  TPR 13.6    Endomyocardial biopsy  After informed consent patient was prepped and draped in the usual fashion. A 7 Danish sheath was placed in the left internal jugular after local anesthesia with 1.0 % lidocaine. A 5.5 Danish  bioptome was passed through the sheath to the right ventricular septum and 5 biopsies were obtained. The biopsy specimens were sent to Pathology for examination. The sheath was removed and hemostasis was obtained. The patient tolerated the procedure well and there were no complications.    COMPLICATIONS:   None    IMPRESSION:   1. Normal right-sided and normal left-sided filling pressures.   2. Normal pulmonary artery pressures with a mean pulmonary artery pressure of 17 mmHg.  3. Normal cardiac output (7.5 L/min) and cardiac index (3.8)  4. Successful endomyocardial biopsy with 5 specimens sent for pathologic review.    PLAN:   1. Continued medical management for cardiovascular risk factor optimization.  2. Continued management of heart transplant per inpatient Cards II  team.    Findings discussed with inpatient Cards II team.     See CVIS report for final draft.      Miah Mack MD  Interventional Cardiology Fellow

## 2018-08-24 NOTE — PROGRESS NOTES
Brief Nephrology Note:    Will dialyze tomorrow per TTS schedule. Given report of melena, will not use heparin with dialysis.    Kristi Armas PA-C  Pager [phone]

## 2018-08-24 NOTE — PROGRESS NOTES
Cardiology 2 Progress Note           Assessment and Plan:   Murray Nicholson is a 63 year old male with a h/o NICM s/p heart txp (6/14/18), ESRD on HD, R internal jugular thrombus on apixaban and DM2 p/w 3-4 bright red BM and fevers/chills, found to have a daniella-colonic abscess (7.8 cm), daniella-rectal abscess (2.7 cm), probable diverticulitis, and R colon colitis.    Changes today:  - POD#12 for laparoscopic assisted sigmoid colectomy with end colostomy  - Had dark black stool in ostomy yesterday PM  - Holding heparin gtt  - Tacro gtt, target level 7-8  - Follow up tac level today  - RHC/bx moved to today    # Daniella-colonic abscess (7.8 cm) s/p laparoscopic assisted sigmoid colectomy with end colostomy 8/14) positive for VRE  # Daniella-rectal abscess (2.7 cm) s/p drainage 8/12  # R colon colitis  # Positive c. Diff  Advanced diet yesterday and discontinued PPN.    - Start cipro, PO linezolid, PO flagyl  - Discontinue Cefepime, IV flagyl   - Continue PO vanc  - Change ganciclovir to valcyte tomorrow  - Miralax daily  - Acetaminophen, tramadol PRN pain  - Colorectal, transplant ID following, appreciate assistance    # Recent R internal jugular line-associated thrombus  # Acute anemia  # Melena  Noted to have melena in ostomy, holding heparin gtt.  Discussed with colorectal, would plan to scope if hemoglobin persistently following.  Will trend Hgb for now and transfuse for Hgb <7.  - Hold heparin gtt  - Trend Hgb    # S/p heart transplant 6/13/2018 (donor 3 vessel CAD  HSV1-, HSV1+, CMV D+/R-, EBV D?/R+, VZV+  tacro goal 7-8 due to infx  Transition to oral immunosuppression today; keeping tacro IV for now.  - Tacro gtt  - Lowered prednisone to 10/10 this admit  - Continue MMF  - Continue statin  - Received pentamadine 8/17   - Continue nystatin swish  - Planned for RHC/bx tomorrow    # Nutrition  Patient has tolerated advancing diet but is taking in less calories than necessary per calorie counting.  May need to restart  nutritional support pending further intake monitoring.  Nutrition following.    # Recent pseudomonas bacteremia  # Necrotic mouth ulcer, resolving  Had profound neutropenia and pseudomonas bacteremia several weeks ago in associated with necrotic mouth ulcer; neutropenia likely immunosuppression induced.  Neutropenia resolved last admission and pseudomonal bacteremia is likely cleared.  Necrotic ulcer appears to be resolving from prior admission.  Not currently active issues but will continue to monitor.    # Pulmonary nodules  Incidental on CT, will need interval follow up in 4-6 weeks.    # ESRD on TTS HD  - Nephrology consulted, appreciate assistance    # DM2   - Glargine, medium SSI    FEN: Advance as tolerated  PPx: Holding heparin  Dispo: PT/OT to see when appropriate    Bobby Bearden MD  Internal Medicine PGY-3  [phone]    Patient discussed with Dr. Ernst.       Subjective:   Overnight required 1u PRBC.  This AM feels well, has not noted any bleeding.  Thinks he had slightly more ostomy output overnight.          Medications:       acetaminophen  1,000 mg Oral 4x Daily     ciprofloxacin  500 mg Oral Q24H JEAN     fluticasone  1-2 spray Both Nostrils Daily     ganciclovir (CYTOVENE) intermittent infusion  0.625 mg/kg (Ideal) Intravenous Once per day on Tue Thu Sat     hydrALAZINE  75 mg Oral 4x Daily     insulin aspart  1-6 Units Subcutaneous Q4H     insulin glargine  15 Units Subcutaneous QAM AC     linezolid  600 mg Oral Q12H JEAN     lipids  250 mL Intravenous Once per day on Sun Mon Tue Wed Thu     metroNIDAZOLE  500 mg Oral Q8H JEAN     mycophenolate  500 mg Oral BID     nystatin  1,000,000 Units Swish & Swallow 4x Daily     pantoprazole (PROTONIX) IV  40 mg Intravenous Daily with breakfast     predniSONE  10 mg Oral BID w/meals     sodium chloride (PF)  3 mL Intracatheter Q8H     sodium chloride (PF)  3 mL Intracatheter Q8H     triamcinolone   Topical BID     vancomycin  125 mg Oral 4x Daily       IV  fluid REPLACEMENT ONLY       IV fluid REPLACEMENT ONLY       tacrolimus (Prograf) infusion ADULT 84 mcg/hr (08/24/18 0038)               Objective:          Physical Exam:   Temp:  [98.2  F (36.8  C)-98.7  F (37.1  C)] 98.6  F (37  C)  Heart Rate:  [86-96] 86  Resp:  [16-18] 16  BP: (102-159)/() 139/91  SpO2:  [100 %] 100 %  I/O last 3 completed shifts:  In: 1493.18 [P.O.:870; I.V.:243.18]  Out: 4100 [Urine:150; Other:3500; Stool:450]    Vitals:    08/20/18 0841 08/21/18 1014 08/22/18 0600 08/23/18 0631   Weight: 80 kg (176 lb 5.9 oz) 80.5 kg (177 lb 7.5 oz) 80.1 kg (176 lb 9.4 oz) 83.5 kg (184 lb 1.4 oz)    08/24/18 0700   Weight: 81.9 kg (180 lb 8.9 oz)     GEN: Laying in bed, NAD  PULM: CTAB from anterior exam  CV: Regular rate and rhythm.  JVP not elevated   ABD: Colostomy site without surrounding redness.  Soft dark stool in ostomy bag.  Incision C/D/I.  Hyperactive bowel sounds.  Mild diffuse tenderness to palpation in lower quadrants improved from prior exam.  EXT: Trace lower extremity edema         Data:   BMP    Recent Labs  Lab 08/24/18  0634 08/23/18  0620 08/22/18  0741 08/21/18  0633   * 125* 128* 128*   POTASSIUM 3.9 3.6 3.3* 3.8   CHLORIDE 91* 90* 93* 94   TRINI 7.9* 7.4* 7.3* 7.8*   CO2 24 23 24 23   BUN 49* 65* 46* 45*   CR 3.36* 4.25* 3.22* 3.66*   * 276* 256* 294*     LFTs    Recent Labs  Lab 08/20/18  1105   ALKPHOS 170*   AST 41   ALT 18   BILITOTAL 0.4   PROTTOTAL 6.1*   ALBUMIN 1.7*      CBC    Recent Labs  Lab 08/24/18  0634  08/23/18  0620  08/22/18  0741 08/21/18  2120   WBC 5.6  --  5.5  --  7.2 6.0   RBC 2.47*  --  2.20*  --  2.63* 2.34*   HGB 7.6*  < > 6.9*  < > 7.9* 6.9*   HCT 22.7*  --  19.4*  --  23.5* 20.3*   MCV 92  --  88  --  89 87   MCH 30.8  --  31.4  --  30.0 29.5   MCHC 33.5  --  35.6  --  33.6 34.0   RDW 17.7*  --  18.0*  --  17.9* 18.3*   PLT 78*  --  108*  --  119* 118*   < > = values in this interval not displayed.  INR    Recent Labs  Lab 08/20/18  7483    INR 0.99

## 2018-08-24 NOTE — PROGRESS NOTES
Calorie Counts  Intake recorded for: 8/23                   Kcals: 456                 Protein: 8g  # Meals Recorded: 100% dinner, include: cream of wheat, cream of potato soup, white toast   # Supplements Recorded: 0

## 2018-08-24 NOTE — PROGRESS NOTES
"  WO Nurse Inpatient [facility], [place]   WO Nurse Inpatient Adult     Follow Up Assessment   Assessment of new end Colostomy Stoma complication(s) none   Mucocutaneous junction; intact   Peristomal complication(s) small amt of bruising on left side   Pouch wear time:3-4 days,   Following today's visit:Patient /   is  able to demonstrate;       1. How to empty their pouch? Yes, needs practice      2. How to change their pouch?  Yes, needs practice      Objective data:  Patient history according to medical record:63 year old male with NICM s/p heart transplant 6/2018 on immunosuppressants, with ESRD on HD, R internal jugular thrombus previously on apixaban, T2DM, ongoing pseudomonal bacteremia admitted to cards service for painless BRBPR. Found to have diverticulitis with 7.8 cm pericolonic abscess without safe window for IR drain. 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 Post-op course complicated by ileus, now resolved with return of good bowel function. Was on heparin gtt for R internal jugular thrombus, however was stopped due to concern for bleeding with slowly downtrending Hgb    Current Diet/Nutrition:   Active Diet Order      Low Fiber Diet   TPN no   I/O last 3 completed shifts:  In: 1493.18 [P.O.:870; I.V.:243.18]  Out: 4100 [Urine:150; Other:3500; Stool:450]  Labs:    Recent Labs  Lab 08/24/18  0634  08/20/18  1105   ALBUMIN  --   --  1.7*   HGB 7.6*  < > 9.1*   INR  --   --  0.99   WBC 5.6  < > 7.0   < > = values in this interval not displayed.     Physical Exam:  Current pouching system:Shashi 1 piece flat 30% melted in 4 days  Reason for pouch change today: ostomy education and routine schedule  Stoma appearance: red, oval and moist  Stoma size; 1 1/4\" x 1 3/4\" with adequate protrusion   Peristomal skin: mottled light purple bruising on left side  Stoma " output : green/:brown and gas   Abdominal  Assessment  soft , NG still in place? No  Surgical Site: open to air  Pain: Dull ache  Is patient still on a PCA No    Interventions:  Patient's chart evaluated.  Focus of today's visit: pouch change return demonstration and verbal instruction  : reviewed low residue diet  Participant of teaching session today patient   Orders: Reviewed  Change made with ostomy management today: No  Patient/family: performed with verbal cues  Supplies:at bedside    Plan:  Learning needs: pouch change return demonstration and discharge instructions  Preparation for discharge: No discharge preparations started  Recommend home care? yes    Discussed plan of care with Patient, Nurse and Physician  Nursing to notify the Provider(s) and re-consult the WOC Nurse if new ostomy concerns or discharge planned before next planned WOC visit.    WOC Nurse will return: Monday  Face to face time: 40 minutes

## 2018-08-24 NOTE — PLAN OF CARE
Problem: Patient Care Overview  Goal: Plan of Care/Patient Progress Review  Outcome: No Change  D: Bacteremia and GI bleed. 8/14 partial small bowel resection/ colostomy.     I: TPN discontinued 8/23. Increased to low fiber diet, pt tolerating well. PRN tramadol for generalized pain. Tacro gtt at 84 mcg/hr. 1 unit RBC 8/23, awaiting Hgb recheck this AM.     A: VSS, A&O, up with 1-2. Pt denies SOB, oxygen stable on RA. Tele SR, HR 80-90's. No urine output overnight. Colostomy WNL, minimal output overnight. Blood sugars elevated overnight, Lantus dose increased today. Pt wondering if RHC/ biopsy will be completed today. Slept well overnight.     P: Will continue to monitor and notify MD of pertinent changes.

## 2018-08-24 NOTE — PROGRESS NOTES
CLINICAL NUTRITION SERVICES     Nutrition Prescription    RECOMMENDATIONS FOR MDs/PROVIDERS TO ORDER:  1. Order nephrocaps, pt on dialysis.  2. If pt is consuming less than 1375 kcals and 75 g protein daily, then rec nutrition support. See future/additional recs below.     Future/Additional Recommendations:  For all recommendations, see prior nutrition notes.     If oral intake is not improving:  -If pt is a candidate for TFs, would rec place feeding tube (FT) and initiate TFs, Nepro TF (lower in K+/Phos). Initiate TFs at 10 mL/hr, advancing rate by 20 mL Q 8 hrs (or per provider discretion, pending hemodynamic stability) to goal rate of 50 mL/hr. This provides 1200 mL TF, 2160 kcals, 97 g PRO, 876 ml free H2O, 193 g CHO and 15 g Fiber daily         If begin tube feeds:    - Flush FT with 30 mL water Q 4 hrs for patency. Rec provider adjust free water flushes as needed, pending fluid status.   - Ensure K+/Mg++/Phos labs are ordered daily until TFs advance to goal infusion to evaluate for sx of refeeding with nutrition received. If lytes trend low, aggressively replace lytes.       - Monitor TF and possible need to adjust nutrition support regimen if necessary, pending medical course and nutrition status.      -See nutrition assessment note 8/13 for central parenteral nutrition (PN) recs via a central line, to provide 100% of nutrition needs at goal vs restart recent peripheral PN regimen.       Kcal counts:  8/22          382 kcals and 14 g protein (meal/s and 50% of one Boost Breeze and 50% of one Boost Plus supplement/s recorded)  8/23          456 kcals and 8 g protein (meal/s and no supplement/s recorded) - [facility] indicates food/beverages sent totaled 1472 kcals and 23 g protein daily on average.   8/24-8/26  Pending  * Not meeting estimated needs of 0857-5943 kcals/day (25 - 30 kcals/kg) and  grams protein/day (1.2 - 1.8 grams of pro/kg). Pt is on a low fiber diet and has orders to offer Boost or  Nepro with meals.     INTERVENTIONS:  Implementation:  Collaboration with other providers: Paged team to rec order nephrocaps and regarding kcal count results so far.     Follow up/Monitoring:  Will continue to follow pt.    Mary Silva, MS, RD, LD, [facility]   6C Pgr: [phone]

## 2018-08-24 NOTE — PLAN OF CARE
Problem: Patient Care Overview  Goal: Plan of Care/Patient Progress Review  Outcome: No Change  Pt A/O. See flowsheets for VS. BPs high this AM + this afternoon following RHC, Cards 2 MD updated with this. NSR. RA. Pain managed with scheduled regimen. BG assessed, no sliding scale insulin given this AM d/t no ssi insulin orders + no ssi given in afternoon d/t pt having RHC/not eating this afternoon. Tacro gtt continues @ 84mcg/hr - PIV on R lower forearm noted to have possibly infiltrated (arm appeared to be puffy), PIV pulled and cold pack applied. Tacro gtt changed to PIV on L arm. Colostomy bag in place, black stool noted. Colostomy interrogated (per pt request) with water to rinse bag following emptying bag; informed after by WOC + colorectal to not do this (pt aware). Pt completed RHC/biopsy this afternoon. L neck with bandaid in place, site C/D/I. Up with assist x2/walker. Worked with therapy. Continue with plan of care and report changes to treatment team. Plan for pt to have HD tomorrow (8/25).

## 2018-08-25 LAB
ANION GAP SERPL CALCULATED.3IONS-SCNC: 9 MMOL/L (ref 3–14)
BASOPHILS # BLD AUTO: 0 10E9/L (ref 0–0.2)
BASOPHILS NFR BLD AUTO: 0 %
BUN SERPL-MCNC: 59 MG/DL (ref 7–30)
CALCIUM SERPL-MCNC: 7.6 MG/DL (ref 8.5–10.1)
CHLORIDE SERPL-SCNC: 90 MMOL/L (ref 94–109)
CO2 SERPL-SCNC: 26 MMOL/L (ref 20–32)
CREAT SERPL-MCNC: 4.3 MG/DL (ref 0.66–1.25)
DIFFERENTIAL METHOD BLD: ABNORMAL
EOSINOPHIL # BLD AUTO: 0 10E9/L (ref 0–0.7)
EOSINOPHIL NFR BLD AUTO: 0.7 %
ERYTHROCYTE [DISTWIDTH] IN BLOOD BY AUTOMATED COUNT: 18 % (ref 10–15)
GFR SERPL CREATININE-BSD FRML MDRD: 14 ML/MIN/1.7M2
GLUCOSE SERPL-MCNC: 217 MG/DL (ref 70–99)
HCT VFR BLD AUTO: 21 % (ref 40–53)
HGB BLD-MCNC: 7.1 G/DL (ref 13.3–17.7)
HGB BLD-MCNC: 8 G/DL (ref 13.3–17.7)
IMM GRANULOCYTES # BLD: 0 10E9/L (ref 0–0.4)
IMM GRANULOCYTES NFR BLD: 0.4 %
LYMPHOCYTES # BLD AUTO: 0.3 10E9/L (ref 0.8–5.3)
LYMPHOCYTES NFR BLD AUTO: 5.6 %
MAGNESIUM SERPL-MCNC: 1.7 MG/DL (ref 1.6–2.3)
MCH RBC QN AUTO: 31 PG (ref 26.5–33)
MCHC RBC AUTO-ENTMCNC: 33.8 G/DL (ref 31.5–36.5)
MCV RBC AUTO: 92 FL (ref 78–100)
MONOCYTES # BLD AUTO: 0.3 10E9/L (ref 0–1.3)
MONOCYTES NFR BLD AUTO: 5.6 %
NEUTROPHILS # BLD AUTO: 4.8 10E9/L (ref 1.6–8.3)
NEUTROPHILS NFR BLD AUTO: 87.7 %
NRBC # BLD AUTO: 0 10*3/UL
NRBC BLD AUTO-RTO: 0 /100
PLATELET # BLD AUTO: 71 10E9/L (ref 150–450)
POTASSIUM SERPL-SCNC: 3.9 MMOL/L (ref 3.4–5.3)
RBC # BLD AUTO: 2.29 10E12/L (ref 4.4–5.9)
SODIUM SERPL-SCNC: 125 MMOL/L (ref 133–144)
TACROLIMUS BLD-MCNC: 8.1 UG/L (ref 5–15)
TME LAST DOSE: NORMAL H
WBC # BLD AUTO: 5.5 10E9/L (ref 4–11)

## 2018-08-25 PROCEDURE — A9270 NON-COVERED ITEM OR SERVICE: HCPCS | Mod: GY | Performed by: COLON & RECTAL SURGERY

## 2018-08-25 PROCEDURE — 80197 ASSAY OF TACROLIMUS: CPT

## 2018-08-25 PROCEDURE — 25000132 ZZH RX MED GY IP 250 OP 250 PS 637: Mod: GY

## 2018-08-25 PROCEDURE — 25000131 ZZH RX MED GY IP 250 OP 636 PS 637: Mod: GY | Performed by: INTERNAL MEDICINE

## 2018-08-25 PROCEDURE — 40000141 ZZH STATISTIC PERIPHERAL IV START W/O US GUIDANCE

## 2018-08-25 PROCEDURE — 90937 HEMODIALYSIS REPEATED EVAL: CPT

## 2018-08-25 PROCEDURE — 25000132 ZZH RX MED GY IP 250 OP 250 PS 637: Mod: GY | Performed by: COLON & RECTAL SURGERY

## 2018-08-25 PROCEDURE — A9270 NON-COVERED ITEM OR SERVICE: HCPCS | Mod: GY | Performed by: STUDENT IN AN ORGANIZED HEALTH CARE EDUCATION/TRAINING PROGRAM

## 2018-08-25 PROCEDURE — 85025 COMPLETE CBC W/AUTO DIFF WBC: CPT | Performed by: COLON & RECTAL SURGERY

## 2018-08-25 PROCEDURE — 36415 COLL VENOUS BLD VENIPUNCTURE: CPT | Performed by: STUDENT IN AN ORGANIZED HEALTH CARE EDUCATION/TRAINING PROGRAM

## 2018-08-25 PROCEDURE — 25000132 ZZH RX MED GY IP 250 OP 250 PS 637: Mod: GY | Performed by: STUDENT IN AN ORGANIZED HEALTH CARE EDUCATION/TRAINING PROGRAM

## 2018-08-25 PROCEDURE — A9270 NON-COVERED ITEM OR SERVICE: HCPCS | Mod: GY | Performed by: INTERNAL MEDICINE

## 2018-08-25 PROCEDURE — 85018 HEMOGLOBIN: CPT | Performed by: STUDENT IN AN ORGANIZED HEALTH CARE EDUCATION/TRAINING PROGRAM

## 2018-08-25 PROCEDURE — 80048 BASIC METABOLIC PNL TOTAL CA: CPT | Performed by: COLON & RECTAL SURGERY

## 2018-08-25 PROCEDURE — 25000125 ZZHC RX 250: Performed by: STUDENT IN AN ORGANIZED HEALTH CARE EDUCATION/TRAINING PROGRAM

## 2018-08-25 PROCEDURE — 25000128 H RX IP 250 OP 636: Performed by: INTERNAL MEDICINE

## 2018-08-25 PROCEDURE — 21400003 ZZH R&B CCU CRITICAL UMMC

## 2018-08-25 PROCEDURE — 00000146 ZZHCL STATISTIC GLUCOSE BY METER IP

## 2018-08-25 PROCEDURE — 25000128 H RX IP 250 OP 636: Performed by: STUDENT IN AN ORGANIZED HEALTH CARE EDUCATION/TRAINING PROGRAM

## 2018-08-25 PROCEDURE — 25000132 ZZH RX MED GY IP 250 OP 250 PS 637: Mod: GY | Performed by: INTERNAL MEDICINE

## 2018-08-25 PROCEDURE — 63400005 ZZH RX 634: Performed by: INTERNAL MEDICINE

## 2018-08-25 PROCEDURE — 83735 ASSAY OF MAGNESIUM: CPT | Performed by: COLON & RECTAL SURGERY

## 2018-08-25 PROCEDURE — 99233 SBSQ HOSP IP/OBS HIGH 50: CPT | Mod: GC | Performed by: INTERNAL MEDICINE

## 2018-08-25 PROCEDURE — A9270 NON-COVERED ITEM OR SERVICE: HCPCS | Mod: GY

## 2018-08-25 RX ORDER — DOXERCALCIFEROL 4 UG/2ML
4 INJECTION INTRAVENOUS
Status: COMPLETED | OUTPATIENT
Start: 2018-08-25 | End: 2018-08-25

## 2018-08-25 RX ADMIN — INSULIN ASPART 2 UNITS: 100 INJECTION, SOLUTION INTRAVENOUS; SUBCUTANEOUS at 09:48

## 2018-08-25 RX ADMIN — ACETAMINOPHEN 1000 MG: 500 TABLET, FILM COATED ORAL at 09:41

## 2018-08-25 RX ADMIN — Medication 2 G: at 14:27

## 2018-08-25 RX ADMIN — VANCOMYCIN HYDROCHLORIDE 125 MG: KIT at 16:02

## 2018-08-25 RX ADMIN — INSULIN ASPART 1 UNITS: 100 INJECTION, SOLUTION INTRAVENOUS; SUBCUTANEOUS at 14:25

## 2018-08-25 RX ADMIN — Medication: at 08:18

## 2018-08-25 RX ADMIN — INSULIN ASPART 2 UNITS: 100 INJECTION, SOLUTION INTRAVENOUS; SUBCUTANEOUS at 20:10

## 2018-08-25 RX ADMIN — NYSTATIN 1000000 UNITS: 100000 SUSPENSION ORAL at 16:02

## 2018-08-25 RX ADMIN — Medication 25000 UNITS: at 20:11

## 2018-08-25 RX ADMIN — VALGANCICLOVIR 450 MG: 450 TABLET, FILM COATED ORAL at 18:14

## 2018-08-25 RX ADMIN — MYCOPHENOLATE MOFETIL 500 MG: 250 CAPSULE ORAL at 09:39

## 2018-08-25 RX ADMIN — TACROLIMUS 84 MCG/HR: 5 INJECTION, SOLUTION INTRAVENOUS at 04:10

## 2018-08-25 RX ADMIN — PANTOPRAZOLE SODIUM 40 MG: 40 INJECTION, POWDER, FOR SOLUTION INTRAVENOUS at 12:26

## 2018-08-25 RX ADMIN — VANCOMYCIN HYDROCHLORIDE 125 MG: KIT at 22:30

## 2018-08-25 RX ADMIN — HYDRALAZINE HYDROCHLORIDE 75 MG: 50 TABLET ORAL at 16:02

## 2018-08-25 RX ADMIN — METRONIDAZOLE 500 MG: 500 TABLET ORAL at 22:29

## 2018-08-25 RX ADMIN — HEPARIN SODIUM 3000 UNITS: 1000 INJECTION, SOLUTION INTRAVENOUS; SUBCUTANEOUS at 11:34

## 2018-08-25 RX ADMIN — NYSTATIN 1000000 UNITS: 100000 SUSPENSION ORAL at 22:30

## 2018-08-25 RX ADMIN — CIPROFLOXACIN HYDROCHLORIDE 500 MG: 250 TABLET, FILM COATED ORAL at 18:14

## 2018-08-25 RX ADMIN — EPOETIN ALFA 7600 UNITS: 10000 SOLUTION INTRAVENOUS; SUBCUTANEOUS at 08:36

## 2018-08-25 RX ADMIN — Medication 1 CAPSULE: at 09:40

## 2018-08-25 RX ADMIN — VANCOMYCIN HYDROCHLORIDE 125 MG: KIT at 20:10

## 2018-08-25 RX ADMIN — Medication 25000 UNITS: at 09:43

## 2018-08-25 RX ADMIN — MYCOPHENOLATE MOFETIL 500 MG: 250 CAPSULE ORAL at 20:11

## 2018-08-25 RX ADMIN — METRONIDAZOLE 500 MG: 500 TABLET ORAL at 14:24

## 2018-08-25 RX ADMIN — PANTOPRAZOLE SODIUM 40 MG: 40 INJECTION, POWDER, FOR SOLUTION INTRAVENOUS at 20:11

## 2018-08-25 RX ADMIN — LINEZOLID 600 MG: 600 TABLET, FILM COATED ORAL at 20:12

## 2018-08-25 RX ADMIN — ACETAMINOPHEN 1000 MG: 500 TABLET, FILM COATED ORAL at 20:12

## 2018-08-25 RX ADMIN — DOXERCALCIFEROL 4 MCG: 4 INJECTION, SOLUTION INTRAVENOUS at 09:43

## 2018-08-25 RX ADMIN — METRONIDAZOLE 500 MG: 500 TABLET ORAL at 06:23

## 2018-08-25 RX ADMIN — NYSTATIN 1000000 UNITS: 100000 SUSPENSION ORAL at 20:11

## 2018-08-25 RX ADMIN — NYSTATIN 1000000 UNITS: 100000 SUSPENSION ORAL at 12:24

## 2018-08-25 RX ADMIN — SODIUM CHLORIDE 300 ML: 9 INJECTION, SOLUTION INTRAVENOUS at 08:18

## 2018-08-25 RX ADMIN — PREDNISONE 10 MG: 10 TABLET ORAL at 09:42

## 2018-08-25 RX ADMIN — ACETAMINOPHEN 1000 MG: 500 TABLET, FILM COATED ORAL at 14:24

## 2018-08-25 RX ADMIN — HYDRALAZINE HYDROCHLORIDE 75 MG: 50 TABLET ORAL at 20:12

## 2018-08-25 RX ADMIN — SODIUM CHLORIDE 250 ML: 9 INJECTION, SOLUTION INTRAVENOUS at 08:18

## 2018-08-25 RX ADMIN — LINEZOLID 600 MG: 600 TABLET, FILM COATED ORAL at 12:23

## 2018-08-25 RX ADMIN — INSULIN GLARGINE 20 UNITS: 100 INJECTION, SOLUTION SUBCUTANEOUS at 09:46

## 2018-08-25 RX ADMIN — VANCOMYCIN HYDROCHLORIDE 125 MG: KIT at 12:18

## 2018-08-25 RX ADMIN — Medication 50 MG: at 22:29

## 2018-08-25 RX ADMIN — PREDNISONE 10 MG: 10 TABLET ORAL at 18:14

## 2018-08-25 ASSESSMENT — PAIN DESCRIPTION - DESCRIPTORS
DESCRIPTORS: ACHING;DULL

## 2018-08-25 ASSESSMENT — ACTIVITIES OF DAILY LIVING (ADL)
ADLS_ACUITY_SCORE: 12

## 2018-08-25 NOTE — PROGRESS NOTES
HEMODIALYSIS TREATMENT NOTE    Date: 8/25/2018  Time: 2:15 PM    Data:  Pre Wt: 82.8 kg (182 lb 8.7 oz)   Desired Wt: 79.3 kg   Weight change: - 3.4 kg  Ultrafiltration - Post Run Net Total Removed (mL): 3400 mL  Ultrafiltration - Post Run Net Total Gain (mL): 0 mL  Vascular Access Status: Yes, secured and visible  Dialyzer Rinse: Streaked, Light  Total Blood Volume Processed: 78.2 Liters  Total Dialysis (Treatment) Time:  3.5 hrs    Lab:   BMP, CBC, Magnesium labs drawn    Interventions/Assessment:  Stable 3.5 hr HD tx via right CVC. Keep SBP > 100 per order. Epogen 7600 units and Hectorol 4 mcg given IV.  before pt ate breakfast. 2 units Novolog given along with 20 units of scheduled Lantus. AM meds given PO that would not dialyze off. UF goal reduced when SBP in the 90s. Pt had no complaints or issues during tx. 3.4 kg removed and CVC heparin locked. Post /75. Report given to primary RN Karley.      Plan:    Next HD tx per renal team.

## 2018-08-25 NOTE — PROGRESS NOTES
Calorie Counts:     Intake recorded for: 8/24 Kcals:  227 Protein: 4g    # meals recorded: 100% 2 honey packets, 75% cream of wheat, 50% potato soup, 25% yogurt    # supplements recorded: 0

## 2018-08-25 NOTE — PROGRESS NOTES
Cardiology 2 Progress Note           Assessment and Plan:   Murray Nicholson is a 63 year old male with a h/o NICM s/p heart txp (6/14/18), ESRD on HD, R internal jugular thrombus on apixaban and DM2 p/w 3-4 bright red BM and fevers/chills, found to have a daniella-colonic abscess (7.8 cm), daniella-rectal abscess (2.7 cm), probable diverticulitis, and R colon colitis.    Changes today:  - POD#13 for laparoscopic assisted sigmoid colectomy with end colostomy  - Has small amount of melena again today  - Hgb 7.1 this AM  - Holding heparin gtt  - Tacro gtt, target level 7-8    # Daniella-colonic abscess (7.8 cm) s/p laparoscopic assisted sigmoid colectomy with end colostomy 8/14) positive for VRE  # Daniella-rectal abscess (2.7 cm) s/p drainage 8/12  # R colon colitis  # Positive c. Diff  Continuing to encourage PO intake as able, patient reports tolerating well this AM.  - Continue cipro, PO linezolid, PO flagyl  - Continue PO vanc  - Continue valganciclovir 450 mg PO Tues/Sat for ppx  - Miralax daily  - Acetaminophen, tramadol PRN pain  - Colorectal, transplant ID following, appreciate assistance    # Recent R internal jugular line-associated thrombus  # Acute anemia  # Melena  Noted to have melena in ostomy, holding heparin gtt.  Discussed with colorectal, would plan to scope if hemoglobin persistently falling - anticipate this is more likely today given down-trending Hgb and ongoing melena but will follow up repeat Hgb this PM.  Transfuse for Hgb <7.  - Increased pantoprazole to 40 mg IV BID  - Hold heparin gtt  - Trend Hgb    # S/p heart transplant 6/13/2018 (donor 3 vessel CAD  HSV1-, HSV1+, CMV D+/R-, EBV D?/R+, VZV+  tacro goal 7-8 due to infx  Transitioned to oral immunosuppression; keeping tacro IV for now.  - Tacro gtt  - Lowered prednisone to 10/10 this admit  - Continue MMF  - Continue statin  - Received pentamadine 8/17   - Continue nystatin swish  - RHC/bx 8/25 with normal R/L-sided filling pressures, normal PAP with mean  17, normal CO (7.5) and CI (3.8)  - RHC biopsy results pending from 8/25    # Nutrition  Patient has tolerated advancing diet but is taking in less calories than necessary per calorie counting.  May need to restart nutritional support pending further intake monitoring.  Nutrition following.    # Recent pseudomonas bacteremia  # Necrotic mouth ulcer, resolving  Had profound neutropenia and pseudomonas bacteremia several weeks ago in associated with necrotic mouth ulcer; neutropenia likely immunosuppression induced.  Neutropenia resolved last admission and pseudomonal bacteremia is likely cleared.  Necrotic ulcer appears to be resolving from prior admission.  Not currently active issues but will continue to monitor.    # Pulmonary nodules  Incidental on CT, will need interval follow up in 4-6 weeks.    # ESRD on TTS HD  - Nephrology consulted, appreciate assistance    # DM2   - Glargine, medium SSI    FEN: Advance as tolerated  PPx: Holding heparin  Dispo: PT/OT    Bobby Bearden MD  Internal Medicine PGY-3  [phone]    Patient discussed with Dr. Ernst.       Subjective:   Overnight no acute events.  This AM notes he has small amount of black stool in his ostomy.  Otherwise feels like he is doing well, continues to eat more daily.  Abdominal pain continues to slowly improve and he reports staying active.  Going to dialysis this AM.          Medications:       acetaminophen  1,000 mg Oral 4x Daily     beta carotene  25,000 Units Oral BID     ciprofloxacin  500 mg Oral Q24H JEAN     fluticasone  1-2 spray Both Nostrils Daily     gelatin absorbable  1 each Topical During Hemodialysis (from stock)     heparin  3 mL Intracatheter During Hemodialysis (from stock)     heparin  3 mL Intracatheter During Hemodialysis (from stock)     hydrALAZINE  75 mg Oral 4x Daily     insulin aspart  1-6 Units Subcutaneous TID w/meals     insulin aspart  1-5 Units Subcutaneous At Bedtime     insulin glargine  20 Units Subcutaneous QAM AC      linezolid  600 mg Oral Q12H JAEN     magnesium sulfate  2 g Intravenous Once     metroNIDAZOLE  500 mg Oral Q8H JEAN     mycophenolate  500 mg Oral BID     NEPHROCAPS  1 capsule Oral Daily     nystatin  1,000,000 Units Swish & Swallow 4x Daily     pantoprazole (PROTONIX) IV  40 mg Intravenous BID     predniSONE  10 mg Oral BID w/meals     sodium chloride (PF)  3 mL Intracatheter Q8H     sodium chloride (PF)  3 mL Intracatheter Q8H     triamcinolone   Topical BID     valGANciclovir  450 mg Oral Once per day on Tue Sat     vancomycin  125 mg Oral 4x Daily       IV fluid REPLACEMENT ONLY       IV fluid REPLACEMENT ONLY       tacrolimus (Prograf) infusion ADULT 84 mcg/hr (08/25/18 0410)               Objective:          Physical Exam:   Temp:  [97.6  F (36.4  C)-98.4  F (36.9  C)] 97.8  F (36.6  C)  Heart Rate:  [88-98] 97  Resp:  [16-20] 20  BP: (107-156)/() 113/75  Cuff Mean (mmHg):  [102-114] 114  SpO2:  [97 %-100 %] 100 %  I/O last 3 completed shifts:  In: 254.53 [P.O.:200; I.V.:54.53]  Out: 180 [Stool:180]    Vitals:    08/21/18 1014 08/22/18 0600 08/23/18 0631 08/24/18 0700   Weight: 80.5 kg (177 lb 7.5 oz) 80.1 kg (176 lb 9.4 oz) 83.5 kg (184 lb 1.4 oz) 81.9 kg (180 lb 8.9 oz)    08/25/18 0652   Weight: 82.8 kg (182 lb 8.7 oz)     GEN: Laying in bed, NAD  PULM: CTAB from anterior exam  CV: Regular rate and rhythm.  JVP not elevated   ABD: Colostomy site without surrounding redness.  Soft dark stool in ostomy bag.  Incision C/D/I.  Hyperactive bowel sounds.  Mild diffuse tenderness to palpation in lower quadrants stable from prior exam.  EXT: Trace lower extremity edema         Data:   BMP    Recent Labs  Lab 08/25/18  0821 08/24/18  0634 08/23/18  0620 08/22/18  0741   * 127* 125* 128*   POTASSIUM 3.9 3.9 3.6 3.3*   CHLORIDE 90* 91* 90* 93*   TRINI 7.6* 7.9* 7.4* 7.3*   CO2 26 24 23 24   BUN 59* 49* 65* 46*   CR 4.30* 3.36* 4.25* 3.22*   * 202* 276* 256*     LFTs    Recent Labs  Lab  08/20/18  1105   ALKPHOS 170*   AST 41   ALT 18   BILITOTAL 0.4   PROTTOTAL 6.1*   ALBUMIN 1.7*      CBC    Recent Labs  Lab 08/25/18  0821 08/24/18  1715 08/24/18  0634  08/23/18  0620   WBC 5.5 6.6 5.6  --  5.5   RBC 2.29* 2.61* 2.47*  --  2.20*   HGB 7.1* 7.9*  8.0* 7.6*  < > 6.9*   HCT 21.0* 23.9* 22.7*  --  19.4*   MCV 92 92 92  --  88   MCH 31.0 30.3 30.8  --  31.4   MCHC 33.8 33.1 33.5  --  35.6   RDW 18.0* 18.2* 17.7*  --  18.0*   PLT 71* 78* 78*  --  108*   < > = values in this interval not displayed.  INR    Recent Labs  Lab 08/20/18  1105   INR 0.99

## 2018-08-25 NOTE — PROGRESS NOTES
Pt transported to dialysis this AM via bed. Meds sent with pt. Will continue to monitor pt upon return to unit.

## 2018-08-25 NOTE — PLAN OF CARE
Problem: Patient Care Overview  Goal: Plan of Care/Patient Progress Review  OT/CR 6C: Cx -- pt off unit for majority of day at dialysis, unable to return. Reschedule.

## 2018-08-25 NOTE — PROGRESS NOTES
Nephrology Progress Note  08/25/2018       Murray Nicholson is a 63 year old male with Heart transplant 6/18, ESRD on HD, HTN, Right internal jugular thrombus on Aphixaban, recent admission 7/26-8/6/18  for pseudomonas bacteremia felt to be 2/2 probable prececal colitis and necrotic mouth ulcer, admitted 8/12/18 with abdominal pain, bloody stool and fever, found to have C diff infection, anorectal abscess and pericolonic abscess.        ASSESSMENT AND RECOMMENDATIONS:       ESRD - Etiology of ESRD 2/2 HTN, DM, CRS.   Has chronic OP HD at [facility], TTS schedule, under care of Dr Mcpherson. Right TDC, EDW 76 kg    - Will continue TTS schedule, may have extra run for UF on Monday  - No heparin, melena reported      Volume status -  EDW 76.0 kg. Appears hypervolemic and is ~6 kg over EDW s/p high volume TPN, now stopped. Anuric. Has colostomy.   - Continue pre run weights, standing   - Strict I/O  -  3-4 kg UF today as tolerated       Hypervolemic hyponatremia: ESRD and inability to excrete free water  - As above, condense fluids, limit fluid and Na intake  - Dialyzing on low Na bath to avoid fast correction      BP: -150's  - Will improve when closer to euvolemia      Transplant IS regimen: Tac, MMF, Pred.       BMD: Ca 7.6, alb 1.7 (8/20), Phos 3.6         Anemia, acute on chronic: hgb 7.1, report of melena. On Epo 7600 q run.    - Continue epogen  - May have scope on Monday         Pericolonic abscess, small perircal abscess 2.7 cm.- Underwent I/D anorectal abscess on 8/13 and  Sigmoidectomy/end colostomy on 8/14. Also has C diff, on PO vanc. Antimicrobials per primary. CT suggestive of an ileus and NG tube was placed, now resolved and NG tube out      Lung nodules - New Right lower lobe pulmonary nodules seen on CT this admission. ID recommending close f/u.         Recommendations were communicated to primary team via progress note          Interval History :   Seen on dialysis, attempting 3-4 kg UF,  "volume up s/p TPN which is now stopped. Report of melena so not using heparin, may have scope Monday. Pt reports feeling somewhat better, has full diet ordered and is attempting to eat. Denies CP, SOB, chills.      Review of Systems:   4 point ROS negative other than as noted above.    Physical Exam:   I/O last 3 completed shifts:  In: 254.53 [P.O.:200; I.V.:54.53]  Out: 180 [Stool:180]   /88  Pulse 101  Temp 97.8  F (36.6  C) (Oral)  Resp 20  Ht 1.753 m (5' 9\")  Wt 82.8 kg (182 lb 8.7 oz)  SpO2 100%  BMI 26.96 kg/m2     GENERAL APPEARANCE: alert, NAD  EYES: no scleral icterus, pupils equal  Pulmonary: lungs CTA  CV: RRR   - Edema - trace LE, increasing facial edema  GI: soft, colostomy/stoma   MS: no evidence of inflammation in joints, no muscle tenderness  SKIN: no rash, warm, dry, no cyanosis  NEURO: alert/oriented  ACCESS: Right TDC    Labs:   All labs reviewed by me  Electrolytes/Renal -   Recent Labs   Lab Test  08/25/18   0821  08/24/18   0634  08/23/18   0620  08/22/18   0741  08/21/18   0633   NA  125*  127*  125*  128*  128*   POTASSIUM  3.9  3.9  3.6  3.3*  3.8   CHLORIDE  90*  91*  90*  93*  94   CO2  26  24  23  24  23   BUN  59*  49*  65*  46*  45*   CR  4.30*  3.36*  4.25*  3.22*  3.66*   GLC  217*  202*  276*  256*  294*   TRINI  7.6*  7.9*  7.4*  7.3*  7.8*   MAG  1.7  1.8  2.1  1.8  2.2   PHOS   --   3.6   --   2.7  2.9       CBC -   Recent Labs   Lab Test  08/25/18   0821  08/24/18   1715  08/24/18   0634   WBC  5.5  6.6  5.6   HGB  7.1*  7.9*  8.0*  7.6*   PLT  71*  78*  78*       LFTs -   Recent Labs   Lab Test  08/20/18   1105  08/14/18   0620  08/13/18   0618   ALKPHOS  170*  110  99   BILITOTAL  0.4  0.3  0.3   ALT  18  17  17   AST  41  29  28   PROTTOTAL  6.1*  5.3*  5.2*   ALBUMIN  1.7*  1.6*  1.6*       Iron Panel -   Recent Labs   Lab Test  07/10/18   0711  05/08/18   0954  07/19/17   1306   IRON  66  58  46   IRONSAT  28  27  18   ALBERTINA  771*  621*  369       Current " Medications:    acetaminophen  1,000 mg Oral 4x Daily     beta carotene  25,000 Units Oral BID     ciprofloxacin  500 mg Oral Q24H JEAN     doxercalciferol  4 mcg Intravenous Once in dialysis     fluticasone  1-2 spray Both Nostrils Daily     gelatin absorbable  1 each Topical During Hemodialysis (from stock)     heparin  3 mL Intracatheter During Hemodialysis (from stock)     heparin  3 mL Intracatheter During Hemodialysis (from stock)     hydrALAZINE  75 mg Oral 4x Daily     insulin aspart  1-6 Units Subcutaneous TID w/meals     insulin aspart  1-5 Units Subcutaneous At Bedtime     insulin glargine  20 Units Subcutaneous QAM AC     linezolid  600 mg Oral Q12H JEAN     metroNIDAZOLE  500 mg Oral Q8H JEAN     mycophenolate  500 mg Oral BID     NEPHROCAPS  1 capsule Oral Daily     nystatin  1,000,000 Units Swish & Swallow 4x Daily     pantoprazole  40 mg Oral BID AC     predniSONE  10 mg Oral BID w/meals     sodium chloride (PF)  3 mL Intracatheter Q8H     sodium chloride (PF)  3 mL Intracatheter Q8H     triamcinolone   Topical BID     valGANciclovir  450 mg Oral Once per day on Tue Sat     vancomycin  125 mg Oral 4x Daily       IV fluid REPLACEMENT ONLY       IV fluid REPLACEMENT ONLY       tacrolimus (Prograf) infusion ADULT 84 mcg/hr (08/25/18 0410)     Kristi Armas PA-C

## 2018-08-25 NOTE — PLAN OF CARE
Problem: Patient Care Overview  Goal: Plan of Care/Patient Progress Review  D: Pt with h/y NICM, HT (6/14/18),ESRD on HD . DM2, ,episodes of red BM, fevers,chills, daniella-colonic abcess,daniella-rectal abcess, diverticulitis, colitis per note. Positive C.diff.   I: Monitored vitals and assessed pt status.   Running: Prograf gtt at 84 mcg/hr (4.2 ml/hr)  PRN: Tramadol prn for abdominal pain.      A: A0x4. VSS, on RA.SR. Afebrile. Pain controlled with tylenol and tramadol with effect.  Blood sugars 304 at bedtime, MD cross cover notified, 3 units given per protocol. Colostomy bag in place ,with small amount of black stool. L neck covered with bandage,no bleeding,no hematoma observed. The lab will be drawn during dialysis per  HD nurse.      Temp:  [97.6  F (36.4  C)-98.4  F (36.9  C)] 97.6  F (36.4  C)  Heart Rate:  [86-98] 94  Resp:  [16-18] 18  BP: (129-153)/() 136/90  Cuff Mean (mmHg):  [102-120] 113  SpO2:  [97 %-100 %] 100 %       P: Continue to monitor Pt status and report changes to treatment team. Plan for HD today.

## 2018-08-25 NOTE — PLAN OF CARE
Problem: Patient Care Overview  Goal: Plan of Care/Patient Progress Review  Outcome: No Change  Pt A/O. See flowsheets for VS. NSR/ST. RA. Pain managed with scheduled Tylenol. BG assessed and managed per protocol. Pt completed HD this AM; some meds given late d/t HD. Tacro gtt continues @ 84mcg/hr. Pt given Mg this afternoon, per order. New PIV placed. Colostomy bag in place, black stool. Up to chair this afternoon, assist x1-2/waker. Continue with plan of care and report changes to treatment team.

## 2018-08-25 NOTE — PROGRESS NOTES
Colorectal Surgery Progress Note  POD#13    Subjective:  Yesterday had planned R heart cath with biopsies (transplant protocol), tolerated well. Seen by Dr. Tran, determined to have melena, location of GI bleeding unknown. Discussed with transplant team on 8/24 - if patient re-bleeds this weekend requiring transfusion, Chelsea will plan scope on Monday (8/27). Overall feels alright. Tolerating low fiber diet, no N/V.       Vitals:  Vitals:    08/24/18 2240 08/24/18 2242 08/25/18 0427 08/25/18 0441   BP:  136/90 (!) 156/103 (!) 156/103   BP Location:       Pulse:       Resp: 18 18 18   Temp:  97.6  F (36.4  C)     TempSrc:  Oral     SpO2: 100%  98%    Weight:       Height:           I/O:  I/O last 3 completed shifts:  In: 319.8 [P.O.:240; I.V.:79.8]  Out: 180 [Stool:180]    Physical Exam:  Gen: AAOx3, NAD  Pulm: Non-labored breathing  Abd: Soft, non-distended, non-tender   Incision C/D/I with dermabond    Stoma pink and viable with large, dark semi-solid stool, and gas in bag  Ext:  Warm and well-perfused    BMP    Recent Labs  Lab 08/24/18  0634 08/23/18  0620 08/22/18  0741 08/21/18  0633 08/20/18  1105   * 125* 128* 128* 134   POTASSIUM 3.9 3.6 3.3* 3.8 3.6   CHLORIDE 91* 90* 93* 94 98   CO2 24 23 24 23 26   BUN 49* 65* 46* 45* 21   CR 3.36* 4.25* 3.22* 3.66* 2.20*   * 276* 256* 294* 209*   MAG 1.8 2.1 1.8 2.2 1.7   PHOS 3.6  --  2.7 2.9 1.5*     CBC  Recent Labs  Lab 08/24/18  1715 08/24/18  0634 08/23/18  1654 08/23/18  0620  08/22/18  0741   WBC 6.6 5.6  --  5.5  --  7.2   HGB 7.9*  8.0* 7.6* 8.9* 6.9*  < > 7.9*   HCT 23.9* 22.7*  --  19.4*  --  23.5*   PLT 78* 78*  --  108*  --  119*   < > = values in this interval not displayed.      ASSESSMENT: This is a 63 year old male with NICM s/p heart transplant 6/2018 on immunosuppressants, with ESRD on HD, R internal jugular thrombus previously on apixaban, T2DM, hx pseudomonal bacteremia admitted to cards service for painless BRBPR. Found to have  diverticulitis with 7.8 cm pericolonic abscess without safe window for IR drain. 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 Post-op course complicated by ileus, and concern for bleeding with slowly down-trending Hgb. Ileus now resolved with return of bowel function. Hgb drifting secondary to bleeding from GI source, 1 U PRBC x 2 since 8/21. Appreciate care per primary team.     - Continue low residue diet, will follow calorie counts  - Miralax every day  - Replete lytes to Mg >2, Phos >3, K >4  - If requires additional transfusion over the weekend, Chelsea will scope 8/27      Bird Fairchild MD  General Surgery PGY-1  Colon and Rectal Surgery Service  [phone]    Patient was seen with team and discussed with staff

## 2018-08-26 ENCOUNTER — APPOINTMENT (OUTPATIENT)
Dept: OCCUPATIONAL THERAPY | Facility: CLINIC | Age: 63
DRG: 329 | End: 2018-08-26
Payer: MEDICARE

## 2018-08-26 LAB
ANION GAP SERPL CALCULATED.3IONS-SCNC: 11 MMOL/L (ref 3–14)
BASOPHILS # BLD AUTO: 0 10E9/L (ref 0–0.2)
BASOPHILS NFR BLD AUTO: 0 %
BUN SERPL-MCNC: 38 MG/DL (ref 7–30)
CALCIUM SERPL-MCNC: 8 MG/DL (ref 8.5–10.1)
CHLORIDE SERPL-SCNC: 94 MMOL/L (ref 94–109)
CO2 SERPL-SCNC: 26 MMOL/L (ref 20–32)
CREAT SERPL-MCNC: 3.4 MG/DL (ref 0.66–1.25)
DIFFERENTIAL METHOD BLD: ABNORMAL
EOSINOPHIL # BLD AUTO: 0 10E9/L (ref 0–0.7)
EOSINOPHIL NFR BLD AUTO: 0.3 %
ERYTHROCYTE [DISTWIDTH] IN BLOOD BY AUTOMATED COUNT: 18.7 % (ref 10–15)
GFR SERPL CREATININE-BSD FRML MDRD: 18 ML/MIN/1.7M2
GLUCOSE SERPL-MCNC: 165 MG/DL (ref 70–99)
HCT VFR BLD AUTO: 23 % (ref 40–53)
HGB BLD-MCNC: 7.7 G/DL (ref 13.3–17.7)
IMM GRANULOCYTES # BLD: 0 10E9/L (ref 0–0.4)
IMM GRANULOCYTES NFR BLD: 0.2 %
LYMPHOCYTES # BLD AUTO: 0.2 10E9/L (ref 0.8–5.3)
LYMPHOCYTES NFR BLD AUTO: 4.1 %
MAGNESIUM SERPL-MCNC: 1.9 MG/DL (ref 1.6–2.3)
MCH RBC QN AUTO: 31.2 PG (ref 26.5–33)
MCHC RBC AUTO-ENTMCNC: 33.5 G/DL (ref 31.5–36.5)
MCV RBC AUTO: 93 FL (ref 78–100)
MONOCYTES # BLD AUTO: 0.7 10E9/L (ref 0–1.3)
MONOCYTES NFR BLD AUTO: 11.4 %
NEUTROPHILS # BLD AUTO: 4.9 10E9/L (ref 1.6–8.3)
NEUTROPHILS NFR BLD AUTO: 84 %
NRBC # BLD AUTO: 0 10*3/UL
NRBC BLD AUTO-RTO: 0 /100
PLATELET # BLD AUTO: 74 10E9/L (ref 150–450)
POTASSIUM SERPL-SCNC: 4.3 MMOL/L (ref 3.4–5.3)
RBC # BLD AUTO: 2.47 10E12/L (ref 4.4–5.9)
SODIUM SERPL-SCNC: 130 MMOL/L (ref 133–144)
TACROLIMUS BLD-MCNC: 10.1 UG/L (ref 5–15)
TME LAST DOSE: NORMAL H
WBC # BLD AUTO: 5.8 10E9/L (ref 4–11)

## 2018-08-26 PROCEDURE — A9270 NON-COVERED ITEM OR SERVICE: HCPCS | Mod: GY | Performed by: INTERNAL MEDICINE

## 2018-08-26 PROCEDURE — 21400003 ZZH R&B CCU CRITICAL UMMC

## 2018-08-26 PROCEDURE — A9270 NON-COVERED ITEM OR SERVICE: HCPCS | Mod: GY | Performed by: COLON & RECTAL SURGERY

## 2018-08-26 PROCEDURE — 25000131 ZZH RX MED GY IP 250 OP 636 PS 637: Mod: GY | Performed by: INTERNAL MEDICINE

## 2018-08-26 PROCEDURE — A9270 NON-COVERED ITEM OR SERVICE: HCPCS | Mod: GY | Performed by: STUDENT IN AN ORGANIZED HEALTH CARE EDUCATION/TRAINING PROGRAM

## 2018-08-26 PROCEDURE — 80197 ASSAY OF TACROLIMUS: CPT

## 2018-08-26 PROCEDURE — 25000132 ZZH RX MED GY IP 250 OP 250 PS 637: Mod: GY | Performed by: STUDENT IN AN ORGANIZED HEALTH CARE EDUCATION/TRAINING PROGRAM

## 2018-08-26 PROCEDURE — 25000132 ZZH RX MED GY IP 250 OP 250 PS 637: Mod: GY

## 2018-08-26 PROCEDURE — 99233 SBSQ HOSP IP/OBS HIGH 50: CPT | Mod: GC | Performed by: INTERNAL MEDICINE

## 2018-08-26 PROCEDURE — 25000128 H RX IP 250 OP 636: Performed by: STUDENT IN AN ORGANIZED HEALTH CARE EDUCATION/TRAINING PROGRAM

## 2018-08-26 PROCEDURE — 25000132 ZZH RX MED GY IP 250 OP 250 PS 637: Mod: GY | Performed by: INTERNAL MEDICINE

## 2018-08-26 PROCEDURE — 00000146 ZZHCL STATISTIC GLUCOSE BY METER IP

## 2018-08-26 PROCEDURE — A9270 NON-COVERED ITEM OR SERVICE: HCPCS | Mod: GY

## 2018-08-26 PROCEDURE — 40000133 ZZH STATISTIC OT WARD VISIT

## 2018-08-26 PROCEDURE — 80048 BASIC METABOLIC PNL TOTAL CA: CPT | Performed by: COLON & RECTAL SURGERY

## 2018-08-26 PROCEDURE — 83735 ASSAY OF MAGNESIUM: CPT | Performed by: COLON & RECTAL SURGERY

## 2018-08-26 PROCEDURE — 25000132 ZZH RX MED GY IP 250 OP 250 PS 637: Mod: GY | Performed by: COLON & RECTAL SURGERY

## 2018-08-26 PROCEDURE — 36415 COLL VENOUS BLD VENIPUNCTURE: CPT | Performed by: COLON & RECTAL SURGERY

## 2018-08-26 PROCEDURE — 85025 COMPLETE CBC W/AUTO DIFF WBC: CPT | Performed by: COLON & RECTAL SURGERY

## 2018-08-26 PROCEDURE — 97535 SELF CARE MNGMENT TRAINING: CPT | Mod: GO

## 2018-08-26 PROCEDURE — 97530 THERAPEUTIC ACTIVITIES: CPT | Mod: GO

## 2018-08-26 PROCEDURE — 25000125 ZZHC RX 250: Performed by: STUDENT IN AN ORGANIZED HEALTH CARE EDUCATION/TRAINING PROGRAM

## 2018-08-26 PROCEDURE — 97110 THERAPEUTIC EXERCISES: CPT | Mod: GO

## 2018-08-26 RX ORDER — TACROLIMUS 1 MG/1
3 CAPSULE ORAL
Status: DISCONTINUED | OUTPATIENT
Start: 2018-08-26 | End: 2018-09-03

## 2018-08-26 RX ADMIN — MYCOPHENOLATE MOFETIL 500 MG: 250 CAPSULE ORAL at 07:54

## 2018-08-26 RX ADMIN — METRONIDAZOLE 500 MG: 500 TABLET ORAL at 22:41

## 2018-08-26 RX ADMIN — Medication 25000 UNITS: at 20:55

## 2018-08-26 RX ADMIN — INSULIN ASPART 2 UNITS: 100 INJECTION, SOLUTION INTRAVENOUS; SUBCUTANEOUS at 12:39

## 2018-08-26 RX ADMIN — METRONIDAZOLE 500 MG: 500 TABLET ORAL at 06:23

## 2018-08-26 RX ADMIN — HYDRALAZINE HYDROCHLORIDE 75 MG: 50 TABLET ORAL at 07:54

## 2018-08-26 RX ADMIN — VANCOMYCIN HYDROCHLORIDE 125 MG: KIT at 21:02

## 2018-08-26 RX ADMIN — ACETAMINOPHEN 1000 MG: 500 TABLET, FILM COATED ORAL at 20:55

## 2018-08-26 RX ADMIN — Medication 1 CAPSULE: at 07:55

## 2018-08-26 RX ADMIN — NYSTATIN 1000000 UNITS: 100000 SUSPENSION ORAL at 07:55

## 2018-08-26 RX ADMIN — Medication 50 MG: at 22:41

## 2018-08-26 RX ADMIN — NYSTATIN 1000000 UNITS: 100000 SUSPENSION ORAL at 11:58

## 2018-08-26 RX ADMIN — NYSTATIN 1000000 UNITS: 100000 SUSPENSION ORAL at 20:55

## 2018-08-26 RX ADMIN — Medication 2 G: at 10:00

## 2018-08-26 RX ADMIN — LINEZOLID 600 MG: 600 TABLET, FILM COATED ORAL at 07:55

## 2018-08-26 RX ADMIN — PREDNISONE 10 MG: 10 TABLET ORAL at 18:23

## 2018-08-26 RX ADMIN — METRONIDAZOLE 500 MG: 500 TABLET ORAL at 14:02

## 2018-08-26 RX ADMIN — INSULIN ASPART 2 UNITS: 100 INJECTION, SOLUTION INTRAVENOUS; SUBCUTANEOUS at 19:30

## 2018-08-26 RX ADMIN — INSULIN ASPART 1 UNITS: 100 INJECTION, SOLUTION INTRAVENOUS; SUBCUTANEOUS at 08:04

## 2018-08-26 RX ADMIN — MYCOPHENOLATE MOFETIL 500 MG: 250 CAPSULE ORAL at 21:01

## 2018-08-26 RX ADMIN — VANCOMYCIN HYDROCHLORIDE 125 MG: KIT at 17:03

## 2018-08-26 RX ADMIN — PREDNISONE 10 MG: 10 TABLET ORAL at 07:55

## 2018-08-26 RX ADMIN — LINEZOLID 600 MG: 600 TABLET, FILM COATED ORAL at 20:55

## 2018-08-26 RX ADMIN — PANTOPRAZOLE SODIUM 40 MG: 40 INJECTION, POWDER, FOR SOLUTION INTRAVENOUS at 20:55

## 2018-08-26 RX ADMIN — Medication 25000 UNITS: at 07:55

## 2018-08-26 RX ADMIN — HYDRALAZINE HYDROCHLORIDE 75 MG: 50 TABLET ORAL at 11:58

## 2018-08-26 RX ADMIN — PANTOPRAZOLE SODIUM 40 MG: 40 INJECTION, POWDER, FOR SOLUTION INTRAVENOUS at 07:55

## 2018-08-26 RX ADMIN — TACROLIMUS 84 MCG/HR: 5 INJECTION, SOLUTION INTRAVENOUS at 01:54

## 2018-08-26 RX ADMIN — INSULIN GLARGINE 20 UNITS: 100 INJECTION, SOLUTION SUBCUTANEOUS at 07:55

## 2018-08-26 RX ADMIN — CIPROFLOXACIN HYDROCHLORIDE 500 MG: 250 TABLET, FILM COATED ORAL at 18:22

## 2018-08-26 RX ADMIN — VANCOMYCIN HYDROCHLORIDE 125 MG: KIT at 11:58

## 2018-08-26 RX ADMIN — NYSTATIN 1000000 UNITS: 100000 SUSPENSION ORAL at 17:04

## 2018-08-26 RX ADMIN — HYDRALAZINE HYDROCHLORIDE 75 MG: 50 TABLET ORAL at 17:03

## 2018-08-26 RX ADMIN — HYDRALAZINE HYDROCHLORIDE 75 MG: 50 TABLET ORAL at 22:41

## 2018-08-26 RX ADMIN — TACROLIMUS 3 MG: 1 CAPSULE ORAL at 18:22

## 2018-08-26 RX ADMIN — VANCOMYCIN HYDROCHLORIDE 125 MG: KIT at 07:54

## 2018-08-26 ASSESSMENT — ACTIVITIES OF DAILY LIVING (ADL)
ADLS_ACUITY_SCORE: 12

## 2018-08-26 ASSESSMENT — PAIN DESCRIPTION - DESCRIPTORS
DESCRIPTORS: ACHING
DESCRIPTORS: DULL;ACHING
DESCRIPTORS: ACHING;DULL

## 2018-08-26 NOTE — PROGRESS NOTES
Calorie Counts  Intake recorded for: 8/25                   Kcals: 51                 Protein: 1g  # Meals Recorded:  50% cream of wheat, 25% coffee  # Supplements Recorded: 0

## 2018-08-26 NOTE — PROGRESS NOTES
Hgb 7.7 today. Stool in ostomy bag loose and dark brown/black in color. Continuing to monitor. Discussions about possible scope if Hgb continues to trend down.  Continues on tacro gtt but possible plans to transition to oral later this pat.   Mg 1.9-replaced w/2g IV.   Reports feeling fatigued, but up in chair for some time after bathing this morning.   Calorie counts through today; fair/good appetite.  Reports abdominal discomfort but declined any pain medication throughout the day.

## 2018-08-26 NOTE — PLAN OF CARE
Problem: Patient Care Overview  Goal: Plan of Care/Patient Progress Review  Discharge Planner OT    OT 6C/CR  Patient plan for discharge: Rehab  Current status: Pt supine <> EOB CGA. Pt required ~2-4 minute rest breaks in between activities throughout session. Pt educated on using figure 4 method for donning/doffing socks. Pt able to demonstrate technique SBA. Pt ambulated ~6 small steps EOB <> chair CGA. Pt tolerated ~15 minutes on LE ergometer on mod resistance with intermittent breaks.   Barriers to return to prior living situation: medical status, abdominal precautions, limited activity tolerance for ADL/IADL IND.  Recommendations for discharge: TCU  Rationale for recommendations: Pt to benefit from skilled OT intervention to progress activity tolerance and maximize ADL/IADL IND and safety.        Entered by: Scarlet Alexis 08/26/2018 12:52 PM

## 2018-08-26 NOTE — PLAN OF CARE
Problem: Patient Care Overview  Goal: Plan of Care/Patient Progress Review  Outcome: Improving    D: Pt admitted 8/12 with abdominal pain and GIB. PMH: NICM s/p heart txp, ESRD on HD, R internal jugular thrombus, DM II.     I/A: A&Ox4. Vital signs stable on RA. Monitor shows sinus rhythm/sinus tachycardia, HR 90's-100's. Dull abdominal pain managed with scheduled Tylenol and PRN Tramadol. Prograf gtt infusing at 84mcg/hr. Oliguric. Moderate amount of dark brown stool in colostomy. Continues on low fiber diet. Blood sugars managed with sliding scale insulin. Up with assist of 1-2. Slept well through the night, making needs known.      P: Continue to monitor. Notify Cards 2 with changes/concerns.

## 2018-08-26 NOTE — PROGRESS NOTES
"Colorectal Surgery Progress Note  POD#14    Subjective:  No acute events overnight, feels \"ok.\" Tolerating low fiber diet with N/V, though calorie counts are low. Patient states he cannot eat enough on the low fiber diet because he exceeds fiber limitations, he does note that he will not eat raw fruits and vegetables, and would like to start regular foods. Hgb at 8 yesterday, continue to suspect GI source. If patient bleeds this weekend requiring transfusion, Chelsea will plan scope on Monday (8/27).       Vitals:  Vitals:    08/25/18 2044 08/25/18 2226 08/26/18 0400 08/26/18 0624   BP: 129/84 132/83     BP Location: Left arm Left arm     Pulse: 97      Resp: 18 18 16    Temp: 98.4  F (36.9  C) 98.5  F (36.9  C)     TempSrc: Oral Oral     SpO2: 100% 100%     Weight:    80.3 kg (177 lb 0.5 oz)   Height:           I/O:  I/O last 3 completed shifts:  In: 381.4 [P.O.:310; I.V.:71.4]  Out: 3830 [Urine:80; Other:3400; Stool:350]    Physical Exam:  Gen: AAOx3, NAD, sitting up in bed, watching news, more alert  Pulm: Non-labored breathing  Abd: Soft, non-distended, non-tender   Incision C/D/I with dermabond    Stoma pink and viable with dark semi-solid stool and gas in bag  Ext:  Warm and well-perfused    BMP    Recent Labs  Lab 08/25/18  0821 08/24/18  0634 08/23/18  0620 08/22/18  0741 08/21/18  0633 08/20/18  1105   * 127* 125* 128* 128* 134   POTASSIUM 3.9 3.9 3.6 3.3* 3.8 3.6   CHLORIDE 90* 91* 90* 93* 94 98   CO2 26 24 23 24 23 26   BUN 59* 49* 65* 46* 45* 21   CR 4.30* 3.36* 4.25* 3.22* 3.66* 2.20*   * 202* 276* 256* 294* 209*   MAG 1.7 1.8 2.1 1.8 2.2 1.7   PHOS  --  3.6  --  2.7 2.9 1.5*     CBC  Recent Labs  Lab 08/25/18  1702 08/25/18  0821 08/24/18  1715 08/24/18  0634  08/23/18  0620   WBC  --  5.5 6.6 5.6  --  5.5   HGB 8.0* 7.1* 7.9*  8.0* 7.6*  < > 6.9*   HCT  --  21.0* 23.9* 22.7*  --  19.4*   PLT  --  71* 78* 78*  --  108*   < > = values in this interval not displayed.      ASSESSMENT: This " is a 63 year old male with NICM s/p heart transplant 6/2018 on immunosuppressants, with ESRD on HD, R internal jugular thrombus previously on apixaban, T2DM, hx pseudomonal bacteremia admitted to cards service for painless BRBPR. Found to have diverticulitis with 7.8 cm pericolonic abscess without safe window for IR drain. 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 Post-op course complicated by ileus, and concern for bleeding with slowly down-trending Hgb. Ileus now resolved with return of bowel function. Hgb drifting secondary to bleeding from GI source, 1 U PRBC x 2 since 8/21. Appreciate care per primary team.     - Advance to regular diet, patient will self adjust to avoid high fiber foods  - Miralax every day  - Replete lytes to Mg >2, Phos >3, K >4  - If requires additional transfusion over the weekend, Chelsea will scope 8/27      Bird Fairchild MD  General Surgery PGY-1  Colon and Rectal Surgery Service  [phone]    Patient was seen with team and discussed with staff

## 2018-08-27 ENCOUNTER — APPOINTMENT (OUTPATIENT)
Dept: PHYSICAL THERAPY | Facility: CLINIC | Age: 63
DRG: 329 | End: 2018-08-27
Payer: MEDICARE

## 2018-08-27 ENCOUNTER — APPOINTMENT (OUTPATIENT)
Dept: OCCUPATIONAL THERAPY | Facility: CLINIC | Age: 63
DRG: 329 | End: 2018-08-27
Payer: MEDICARE

## 2018-08-27 LAB
ALBUMIN SERPL-MCNC: 1.8 G/DL (ref 3.4–5)
ALP SERPL-CCNC: 133 U/L (ref 40–150)
ALT SERPL W P-5'-P-CCNC: 13 U/L (ref 0–70)
ANION GAP SERPL CALCULATED.3IONS-SCNC: 11 MMOL/L (ref 3–14)
AST SERPL W P-5'-P-CCNC: 16 U/L (ref 0–45)
BASOPHILS # BLD AUTO: 0 10E9/L (ref 0–0.2)
BASOPHILS NFR BLD AUTO: 0 %
BILIRUB SERPL-MCNC: 0.5 MG/DL (ref 0.2–1.3)
BUN SERPL-MCNC: 56 MG/DL (ref 7–30)
CALCIUM SERPL-MCNC: 7.9 MG/DL (ref 8.5–10.1)
CHLORIDE SERPL-SCNC: 94 MMOL/L (ref 94–109)
CO2 SERPL-SCNC: 24 MMOL/L (ref 20–32)
COPATH REPORT: NORMAL
CREAT SERPL-MCNC: 4.66 MG/DL (ref 0.66–1.25)
DIFFERENTIAL METHOD BLD: ABNORMAL
EOSINOPHIL # BLD AUTO: 0 10E9/L (ref 0–0.7)
EOSINOPHIL NFR BLD AUTO: 0.8 %
ERYTHROCYTE [DISTWIDTH] IN BLOOD BY AUTOMATED COUNT: 18.8 % (ref 10–15)
GFR SERPL CREATININE-BSD FRML MDRD: 13 ML/MIN/1.7M2
GLUCOSE BLDC GLUCOMTR-MCNC: 156 MG/DL (ref 70–99)
GLUCOSE BLDC GLUCOMTR-MCNC: 213 MG/DL (ref 70–99)
GLUCOSE BLDC GLUCOMTR-MCNC: 214 MG/DL (ref 70–99)
GLUCOSE BLDC GLUCOMTR-MCNC: 214 MG/DL (ref 70–99)
GLUCOSE BLDC GLUCOMTR-MCNC: 222 MG/DL (ref 70–99)
GLUCOSE SERPL-MCNC: 185 MG/DL (ref 70–99)
HCT VFR BLD AUTO: 22.8 % (ref 40–53)
HGB BLD-MCNC: 7.5 G/DL (ref 13.3–17.7)
IMM GRANULOCYTES # BLD: 0 10E9/L (ref 0–0.4)
IMM GRANULOCYTES NFR BLD: 0.2 %
INR PPP: 1.07 (ref 0.86–1.14)
LYMPHOCYTES # BLD AUTO: 0.5 10E9/L (ref 0.8–5.3)
LYMPHOCYTES NFR BLD AUTO: 9.7 %
MAGNESIUM SERPL-MCNC: 2.3 MG/DL (ref 1.6–2.3)
MCH RBC QN AUTO: 31.1 PG (ref 26.5–33)
MCHC RBC AUTO-ENTMCNC: 32.9 G/DL (ref 31.5–36.5)
MCV RBC AUTO: 95 FL (ref 78–100)
MONOCYTES # BLD AUTO: 0.3 10E9/L (ref 0–1.3)
MONOCYTES NFR BLD AUTO: 5.8 %
NEUTROPHILS # BLD AUTO: 4.3 10E9/L (ref 1.6–8.3)
NEUTROPHILS NFR BLD AUTO: 83.5 %
NRBC # BLD AUTO: 0 10*3/UL
NRBC BLD AUTO-RTO: 0 /100
PHOSPHATE SERPL-MCNC: 3.1 MG/DL (ref 2.5–4.5)
PLATELET # BLD AUTO: 91 10E9/L (ref 150–450)
POTASSIUM SERPL-SCNC: 4.7 MMOL/L (ref 3.4–5.3)
PREALB SERPL IA-MCNC: 43 MG/DL (ref 15–45)
PROT SERPL-MCNC: 5.2 G/DL (ref 6.8–8.8)
RBC # BLD AUTO: 2.41 10E12/L (ref 4.4–5.9)
SODIUM SERPL-SCNC: 129 MMOL/L (ref 133–144)
TACROLIMUS BLD-MCNC: 8.5 UG/L (ref 5–15)
TME LAST DOSE: NORMAL H
TRIGL SERPL-MCNC: 117 MG/DL
WBC # BLD AUTO: 5.2 10E9/L (ref 4–11)

## 2018-08-27 PROCEDURE — 97530 THERAPEUTIC ACTIVITIES: CPT | Mod: GP

## 2018-08-27 PROCEDURE — 97110 THERAPEUTIC EXERCISES: CPT | Mod: GP

## 2018-08-27 PROCEDURE — 25000132 ZZH RX MED GY IP 250 OP 250 PS 637: Mod: GY | Performed by: INTERNAL MEDICINE

## 2018-08-27 PROCEDURE — 25000132 ZZH RX MED GY IP 250 OP 250 PS 637: Mod: GY | Performed by: STUDENT IN AN ORGANIZED HEALTH CARE EDUCATION/TRAINING PROGRAM

## 2018-08-27 PROCEDURE — 85610 PROTHROMBIN TIME: CPT | Performed by: COLON & RECTAL SURGERY

## 2018-08-27 PROCEDURE — 25000131 ZZH RX MED GY IP 250 OP 636 PS 637: Mod: GY | Performed by: INTERNAL MEDICINE

## 2018-08-27 PROCEDURE — 83735 ASSAY OF MAGNESIUM: CPT | Performed by: COLON & RECTAL SURGERY

## 2018-08-27 PROCEDURE — 80053 COMPREHEN METABOLIC PANEL: CPT | Performed by: COLON & RECTAL SURGERY

## 2018-08-27 PROCEDURE — 25000132 ZZH RX MED GY IP 250 OP 250 PS 637: Mod: GY | Performed by: NURSE PRACTITIONER

## 2018-08-27 PROCEDURE — 80197 ASSAY OF TACROLIMUS: CPT | Performed by: STUDENT IN AN ORGANIZED HEALTH CARE EDUCATION/TRAINING PROGRAM

## 2018-08-27 PROCEDURE — 21400003 ZZH R&B CCU CRITICAL UMMC

## 2018-08-27 PROCEDURE — 00000146 ZZHCL STATISTIC GLUCOSE BY METER IP

## 2018-08-27 PROCEDURE — 85025 COMPLETE CBC W/AUTO DIFF WBC: CPT | Performed by: COLON & RECTAL SURGERY

## 2018-08-27 PROCEDURE — A9270 NON-COVERED ITEM OR SERVICE: HCPCS | Mod: GY

## 2018-08-27 PROCEDURE — 84100 ASSAY OF PHOSPHORUS: CPT | Performed by: COLON & RECTAL SURGERY

## 2018-08-27 PROCEDURE — A9270 NON-COVERED ITEM OR SERVICE: HCPCS | Mod: GY | Performed by: INTERNAL MEDICINE

## 2018-08-27 PROCEDURE — 40000193 ZZH STATISTIC PT WARD VISIT

## 2018-08-27 PROCEDURE — 84478 ASSAY OF TRIGLYCERIDES: CPT | Performed by: COLON & RECTAL SURGERY

## 2018-08-27 PROCEDURE — A9270 NON-COVERED ITEM OR SERVICE: HCPCS | Mod: GY | Performed by: NURSE PRACTITIONER

## 2018-08-27 PROCEDURE — 84134 ASSAY OF PREALBUMIN: CPT | Performed by: COLON & RECTAL SURGERY

## 2018-08-27 PROCEDURE — 99232 SBSQ HOSP IP/OBS MODERATE 35: CPT | Performed by: NURSE PRACTITIONER

## 2018-08-27 PROCEDURE — G0463 HOSPITAL OUTPT CLINIC VISIT: HCPCS

## 2018-08-27 PROCEDURE — A9270 NON-COVERED ITEM OR SERVICE: HCPCS | Mod: GY | Performed by: STUDENT IN AN ORGANIZED HEALTH CARE EDUCATION/TRAINING PROGRAM

## 2018-08-27 PROCEDURE — 25000132 ZZH RX MED GY IP 250 OP 250 PS 637: Mod: GY

## 2018-08-27 PROCEDURE — 25000125 ZZHC RX 250: Performed by: STUDENT IN AN ORGANIZED HEALTH CARE EDUCATION/TRAINING PROGRAM

## 2018-08-27 PROCEDURE — 40000133 ZZH STATISTIC OT WARD VISIT

## 2018-08-27 PROCEDURE — 25000132 ZZH RX MED GY IP 250 OP 250 PS 637: Mod: GY | Performed by: COLON & RECTAL SURGERY

## 2018-08-27 PROCEDURE — 97110 THERAPEUTIC EXERCISES: CPT | Mod: GO

## 2018-08-27 PROCEDURE — 36415 COLL VENOUS BLD VENIPUNCTURE: CPT | Performed by: COLON & RECTAL SURGERY

## 2018-08-27 RX ORDER — POLYETHYLENE GLYCOL 3350 17 G/17G
17 POWDER, FOR SOLUTION ORAL DAILY
Status: DISCONTINUED | OUTPATIENT
Start: 2018-08-27 | End: 2018-08-27

## 2018-08-27 RX ORDER — LINEZOLID 600 MG/1
600 TABLET, FILM COATED ORAL EVERY 12 HOURS SCHEDULED
Status: COMPLETED | OUTPATIENT
Start: 2018-08-27 | End: 2018-08-27

## 2018-08-27 RX ORDER — BETA-CAROTENE 7500 MCG
25000 CAPSULE ORAL 2 TIMES DAILY
Status: DISCONTINUED | OUTPATIENT
Start: 2018-08-27 | End: 2018-08-28

## 2018-08-27 RX ORDER — CIPROFLOXACIN 250 MG/1
500 TABLET, FILM COATED ORAL
Status: COMPLETED | OUTPATIENT
Start: 2018-08-27 | End: 2018-08-27

## 2018-08-27 RX ORDER — ATORVASTATIN CALCIUM 20 MG/1
20 TABLET, FILM COATED ORAL EVERY EVENING
Status: DISCONTINUED | OUTPATIENT
Start: 2018-08-27 | End: 2018-09-11 | Stop reason: HOSPADM

## 2018-08-27 RX ORDER — METRONIDAZOLE 500 MG/1
500 TABLET ORAL EVERY 8 HOURS SCHEDULED
Status: COMPLETED | OUTPATIENT
Start: 2018-08-27 | End: 2018-08-27

## 2018-08-27 RX ORDER — POLYETHYLENE GLYCOL 3350 17 G/17G
17 POWDER, FOR SOLUTION ORAL DAILY PRN
Status: DISCONTINUED | OUTPATIENT
Start: 2018-08-27 | End: 2018-08-27

## 2018-08-27 RX ADMIN — VANCOMYCIN HYDROCHLORIDE 125 MG: KIT at 20:46

## 2018-08-27 RX ADMIN — LINEZOLID 600 MG: 600 TABLET, FILM COATED ORAL at 07:56

## 2018-08-27 RX ADMIN — METRONIDAZOLE 500 MG: 500 TABLET ORAL at 22:13

## 2018-08-27 RX ADMIN — PREDNISONE 10 MG: 10 TABLET ORAL at 18:14

## 2018-08-27 RX ADMIN — PANTOPRAZOLE SODIUM 40 MG: 40 INJECTION, POWDER, FOR SOLUTION INTRAVENOUS at 07:57

## 2018-08-27 RX ADMIN — HYDRALAZINE HYDROCHLORIDE 75 MG: 50 TABLET ORAL at 20:46

## 2018-08-27 RX ADMIN — INSULIN ASPART 2 UNITS: 100 INJECTION, SOLUTION INTRAVENOUS; SUBCUTANEOUS at 18:50

## 2018-08-27 RX ADMIN — NYSTATIN 1000000 UNITS: 100000 SUSPENSION ORAL at 12:13

## 2018-08-27 RX ADMIN — PANTOPRAZOLE SODIUM 40 MG: 40 INJECTION, POWDER, FOR SOLUTION INTRAVENOUS at 20:55

## 2018-08-27 RX ADMIN — VANCOMYCIN HYDROCHLORIDE 125 MG: KIT at 16:29

## 2018-08-27 RX ADMIN — Medication 1 CAPSULE: at 07:55

## 2018-08-27 RX ADMIN — POLYETHYLENE GLYCOL 3350 17 G: 17 POWDER, FOR SOLUTION ORAL at 12:13

## 2018-08-27 RX ADMIN — Medication 50 MG: at 22:12

## 2018-08-27 RX ADMIN — METRONIDAZOLE 500 MG: 500 TABLET ORAL at 06:38

## 2018-08-27 RX ADMIN — NYSTATIN 1000000 UNITS: 100000 SUSPENSION ORAL at 20:45

## 2018-08-27 RX ADMIN — MYCOPHENOLATE MOFETIL 500 MG: 250 CAPSULE ORAL at 20:46

## 2018-08-27 RX ADMIN — HYDRALAZINE HYDROCHLORIDE 75 MG: 50 TABLET ORAL at 12:12

## 2018-08-27 RX ADMIN — TACROLIMUS 3 MG: 1 CAPSULE ORAL at 07:55

## 2018-08-27 RX ADMIN — METRONIDAZOLE 500 MG: 500 TABLET ORAL at 14:25

## 2018-08-27 RX ADMIN — INSULIN GLARGINE 20 UNITS: 100 INJECTION, SOLUTION SUBCUTANEOUS at 07:56

## 2018-08-27 RX ADMIN — VANCOMYCIN HYDROCHLORIDE 125 MG: KIT at 12:13

## 2018-08-27 RX ADMIN — CIPROFLOXACIN HYDROCHLORIDE 500 MG: 250 TABLET, FILM COATED ORAL at 18:14

## 2018-08-27 RX ADMIN — NYSTATIN 1000000 UNITS: 100000 SUSPENSION ORAL at 07:55

## 2018-08-27 RX ADMIN — INSULIN ASPART 1 UNITS: 100 INJECTION, SOLUTION INTRAVENOUS; SUBCUTANEOUS at 07:57

## 2018-08-27 RX ADMIN — Medication 25000 UNITS: at 20:46

## 2018-08-27 RX ADMIN — HYDRALAZINE HYDROCHLORIDE 75 MG: 50 TABLET ORAL at 16:29

## 2018-08-27 RX ADMIN — VANCOMYCIN HYDROCHLORIDE 125 MG: KIT at 07:54

## 2018-08-27 RX ADMIN — INSULIN ASPART 2 UNITS: 100 INJECTION, SOLUTION INTRAVENOUS; SUBCUTANEOUS at 12:28

## 2018-08-27 RX ADMIN — NYSTATIN 1000000 UNITS: 100000 SUSPENSION ORAL at 16:29

## 2018-08-27 RX ADMIN — ATORVASTATIN CALCIUM 20 MG: 20 TABLET, FILM COATED ORAL at 20:46

## 2018-08-27 RX ADMIN — Medication 25000 UNITS: at 07:55

## 2018-08-27 RX ADMIN — MYCOPHENOLATE MOFETIL 500 MG: 250 CAPSULE ORAL at 07:55

## 2018-08-27 RX ADMIN — PREDNISONE 10 MG: 10 TABLET ORAL at 07:56

## 2018-08-27 RX ADMIN — HYDRALAZINE HYDROCHLORIDE 75 MG: 50 TABLET ORAL at 07:56

## 2018-08-27 RX ADMIN — LINEZOLID 600 MG: 600 TABLET, FILM COATED ORAL at 20:46

## 2018-08-27 RX ADMIN — TACROLIMUS 3 MG: 1 CAPSULE ORAL at 18:13

## 2018-08-27 ASSESSMENT — ACTIVITIES OF DAILY LIVING (ADL)
ADLS_ACUITY_SCORE: 12
ADLS_ACUITY_SCORE: 13
ADLS_ACUITY_SCORE: 13
ADLS_ACUITY_SCORE: 12

## 2018-08-27 ASSESSMENT — PAIN DESCRIPTION - DESCRIPTORS: DESCRIPTORS: ACHING

## 2018-08-27 NOTE — PLAN OF CARE
Problem: Patient Care Overview  Goal: Plan of Care/Patient Progress Review  Discharge Planner OT   Patient plan for discharge: rehab  Current status: SBA for bed mobility supine <> sit. Completed x 4 sit <> stands with min A/CGA pending fatigue with FWW. Pt endorsing dizziness with standing, BP in supine 105/65 (77), in standing 93/61 (70). Pt endorsing significant dizziness, transferred to chair with CGA and FWW to complete LE ergometer. Pt completed x 17 minutes with x 1 rest break.  Barriers to return to prior living situation: decreased strength/activity tolerance, impaired ADLs, impaired mobility, dizziness  Recommendations for discharge: TCU  Rationale for recommendations: Pt is currently below functional baseline for ADLs, transfers, and functional mobility, would benefit from continued therapy to address above deficits and maximize strength/independence in order to return to PLOF.       Entered by: Edith Bloom 08/27/2018 1:08 PM

## 2018-08-27 NOTE — PROGRESS NOTES
[facility]   Cardiology II Service / Advanced Heart Failure  Daily Progress Note      Patient: Murray Nicholson  MRN: [id number]  Admission Date: 8/12/2018  Hospital Day # 15    Assessment and Plan: Murray Nicholson is a 63 year old male with a h/o NICM s/p heart txp (6/14/18), ESRD on HD, R internal jugular thrombus on apixaban and DM2 p/w 3-4 bright red BM and fevers/chills, found to have a daniella-colonic abscess (7.8 cm), daniella-rectal abscess (2.7 cm), probable diverticulitis, and R colon colitis.    Today's Plan:  -advanced diet as tolerated, continue calorie counts  -resume statin   -f/u biopsy and tacro level   -finish cipro, linezolid, and flagyl today  -discontinue beta carotene given risk of toxicity with HD  -ok for scope from our standpoint if CRS recommends this     # Daniella-colonic abscess (7.8 cm) s/p laparoscopic assisted sigmoid colectomy with end colostomy 8/14) positive for VRE  # Daniella-rectal abscess (2.7 cm) s/p drainage 8/12  # R colon colitis  # Positive c. Diff  - Ok to DC cipro, linezolid, flagyl (s/p two week course 8/14-8/27)  - Continue PO vanc x 10 days after abx d/c'ed (through 9/6)  - Miralax daily per GI  - Acetaminophen, tramadol PRN pain  - Colorectal, transplant ID following, appreciate assistance  - Advanced diet as kerline     # Recent right internal jugular line-associated thrombus  # Acute anemia  # Melena  Noted to have melena in ostomy, holding heparin gtt since 8/22. Transfuse for Hgb <7.  - Pantoprazole 40 mg IV BID  - Hold heparin gtt  - Trend Hgb daily given no transfusion for several days   - Per CRS, may scope    # Status post OHT on 6/13/18, history of ICM  # Donor three vessel CAD  Immunosuppression:   --continue prednisone 10 mg BID then per taper   --continue Cellcept 500 mg BID  --continue tacrolimus 3 mg bid (goal 6-8 due to infection), daily troughs    Prophylaxis:   --CAV: aspirin, atorvastatin 20 mg daily (cost concern with rosuva)  --Thrush: Nystatin swish and  swallow  --PCP: s/p pentamidine 8/17, due 9/14  --GI: Protonix BID IV as above  --CMV: valcyte 450 mg renally dosed, ideally 6 mos post-Tx  --Bones: calcium/vitamin D      Serostatus: HSV1-, HSV1+, CMV D+/R-, EBV D?/R+, VZV+    Biopsies: pending from 8/25, due 9/7    # Nutrition  Patient has tolerated advancing diet but is taking in less calories than necessary per calorie counting.   - nutrition following, continue calorie counts     # Recent pseudomonas bacteremia  # Necrotic mouth ulcer, resolving  Had profound neutropenia and pseudomonas bacteremia several weeks ago in associated with necrotic mouth ulcer; neutropenia likely immunosuppression-induced. Neutropenia resolved last admission and pseudomonal bacteremia is likely cleared. Necrotic ulcer appears to be resolving from prior admission. Not currently active issues but will continue to monitor. Low threshold to repeat cultures.      # Pulmonary nodules  Stable nodule but also new RLL nodules 5 mm and 3 mm noted incidentally on CT 8/12, will need interval follow up in 4-6 weeks (~9/16).     # ESRD on TTS HD  - Nephrology consulted, appreciate assistance     # HTN  - at goal on hydralazine 75 mg QID    # DM2   - Glargine increased 8/24, medium sliding scale insulin  - Monitor, may need adjustment now that taking PO     FEN: Advance as tolerated  PPx: Holding heparin for now, SCDs ordered  Dispo: PT/OT recommending TCU at DC    ================================================================    Subjective/24-Hr Events:   Last 24 hr care team notes reviewed. No overnight events. He denies feeling feverish or chills. Reports fair appetite, gets full fast. No feelings of fluid retention. He has not noted blood in ostomy bag or bleeding elsewhere. No significant lightheadedness or dizziness.     ROS:  4 point ROS including respiratory, CV, GI and  (other than that noted in the HPI) is negative.     Medications: Reviewed in EPIC.     Physical Exam:   BP (!) 132/94  "(BP Location: Left arm)  Pulse 97  Temp 98  F (36.7  C) (Oral)  Resp 14  Ht 1.753 m (5' 9\")  Wt 80.3 kg (177 lb 0.5 oz)  SpO2 100%  BMI 26.14 kg/m2    GENERAL: Appears comfortable, in no distress, chronically ill.  HEENT: Eye symmetrical, no discharge or icterus bilaterally. Mucous membranes moist and without lesions.  NECK: Supple, JVD just above clavicle at 90 degrees.   CV: Tachycardic but regular, +S1S2, no murmur, rub, or gallop.   RESPIRATORY: Respirations regular, even, and unlabored. Lungs CTA throughout.   GI: Soft and mildly distended with normoactive bowel sounds present in all quadrants. No tenderness, rebound, guarding. Stoma site CDI, output dark green/brown without overt melena.  EXTREMITIES: No peripheral edema. 2+ bilateral pedal pulses. Warm.   NEUROLOGIC: Alert and oriented x 3. No focal deficits.   MUSCULOSKELETAL: No joint swelling or tenderness.   SKIN: No jaundice. No rashes or lesions. Ulcer on hard palate still present with yellow sloughing.     Labs:  CMP    Recent Labs  Lab 08/27/18  0644 08/26/18  0653 08/25/18  0821 08/24/18  0634  08/22/18  0741 08/21/18  0633 08/20/18  1105   * 130* 125* 127*  < > 128* 128* 134   POTASSIUM 4.7 4.3 3.9 3.9  < > 3.3* 3.8 3.6   CHLORIDE 94 94 90* 91*  < > 93* 94 98   CO2 24 26 26 24  < > 24 23 26   ANIONGAP 11 11 9 11  < > 10 10 10   * 165* 217* 202*  < > 256* 294* 209*   BUN 56* 38* 59* 49*  < > 46* 45* 21   CR 4.66* 3.40* 4.30* 3.36*  < > 3.22* 3.66* 2.20*   GFRESTIMATED 13* 18* 14* 19*  < > 20* 17* 30*   GFRESTBLACK 15* 22* 17* 23*  < > 24* 20* 37*   TRINI 7.9* 8.0* 7.6* 7.9*  < > 7.3* 7.8* 8.8   MAG 2.3 1.9 1.7 1.8  < > 1.8 2.2 1.7   PHOS 3.1  --   --  3.6  --  2.7 2.9 1.5*   PROTTOTAL 5.2*  --   --   --   --   --   --  6.1*   ALBUMIN 1.8*  --   --   --   --   --   --  1.7*   BILITOTAL 0.5  --   --   --   --   --   --  0.4   ALKPHOS 133  --   --   --   --   --   --  170*   AST 16  --   --   --   --   --   --  41   ALT 13  --   --   " --   --   --   --  18   < > = values in this interval not displayed.    CBC    Recent Labs  Lab 08/27/18  0644 08/26/18  0653 08/25/18  1702 08/25/18  0821 08/24/18  1715   WBC 5.2 5.8  --  5.5 6.6   RBC 2.41* 2.47*  --  2.29* 2.61*   HGB 7.5* 7.7* 8.0* 7.1* 7.9*  8.0*   HCT 22.8* 23.0*  --  21.0* 23.9*   MCV 95 93  --  92 92   MCH 31.1 31.2  --  31.0 30.3   MCHC 32.9 33.5  --  33.8 33.1   RDW 18.8* 18.7*  --  18.0* 18.2*   PLT 91* 74*  --  71* 78*       INR    Recent Labs  Lab 08/27/18  0644 08/20/18  1105   INR 1.07 0.99       Time/Communication  I personally spent a total of 25 minutes. Of that 15 minutes was counseling/coordination of patient's care. Plan of care discussed with patient. See my note above for details.    Patient discussed with Dr. Ernst.      Ramya Suh, FRANK, NP-C  Advanced Heart Failure/Cardiology II Service  Pager [phone]

## 2018-08-27 NOTE — PLAN OF CARE
Problem: Patient Care Overview  Goal: Plan of Care/Patient Progress Review  Discharge Planner PT   Patient plan for discharge: Rehab  Current status: Pt very fatigued today, also having troubles with dizziness and orthostatic BPs.  BP sitting at 118/78, standing at 80/59, RN notified team, dizziness not subsiding with time while in standing.  Needing Ilsa for bed mobility and transfers, able to march in place and complete some seated exercises.  Continue to encourage being up in chair  Barriers to return to prior living situation: Need for increased assist with bed mobility, transfers and ambulation, LE weakness, poor activity tolerance  Recommendations for discharge: ARU vs TCU  Rationale for recommendations: Would benefit from continued intensive therapy to progress strength and IND with mobility       Entered by: Dorina Edouard 08/27/2018 3:40 PM

## 2018-08-27 NOTE — PROGRESS NOTES
Cardiology 2 Progress Note           Assessment and Plan:   Murray Nicholson is a 63 year old male with a h/o NICM s/p heart txp (6/14/18), ESRD on HD, R internal jugular thrombus on apixaban and DM2 p/w 3-4 bright red BM and fevers/chills, found to have a daniella-colonic abscess (7.8 cm), daniella-rectal abscess (2.7 cm), probable diverticulitis, and R colon colitis.    Changes today:  - POD#14 for laparoscopic assisted sigmoid colectomy with end colostomy  - Hgb 7.7 this AM, stable  - Advancing diet  - Holding heparin gtt  - Tacro gtt, target level 7-8    # Daniella-colonic abscess (7.8 cm) s/p laparoscopic assisted sigmoid colectomy with end colostomy 8/14) positive for VRE  # Daniella-rectal abscess (2.7 cm) s/p drainage 8/12  # R colon colitis  # Positive c. Diff  Continuing to encourage PO intake as able, patient reports tolerating well this AM.  - Continue cipro, PO linezolid, PO flagyl  - Continue PO vanc  - Continue valganciclovir 450 mg PO Tues/Sat for ppx  - Miralax daily  - Acetaminophen, tramadol PRN pain  - Colorectal, transplant ID following, appreciate assistance    # Recent R internal jugular line-associated thrombus  # Acute anemia  # Melena  Noted to have melena in ostomy, holding heparin gtt.  Discussed with colorectal, would plan to scope if hemoglobin persistently falling.  Transfuse for Hgb <7.  - Pantoprazole 40 mg IV BID  - Hold heparin gtt  - Trend Hgb; will decrease frequency given not requiring transfusion for several days and no overt bleeding    # S/p heart transplant 6/13/2018 (donor 3 vessel CAD  HSV1-, HSV1+, CMV D+/R-, EBV D?/R+, VZV+  tacro goal 7-8 due to infx  Transitioned to oral immunosuppression; keeping tacro IV for now.  - Tacro gtt  - Lowered prednisone to 10/10 this admit  - Continue MMF  - Continue statin  - Received pentamadine 8/17   - Continue nystatin swish  - RHC/bx 8/25 with normal R/L-sided filling pressures, normal PAP with mean 17, normal CO (7.5) and CI (3.8)  - RHC biopsy  results pending from 8/25    # Nutrition  Patient has tolerated advancing diet but is taking in less calories than necessary per calorie counting.  May need to restart nutritional support pending further intake monitoring.  Nutrition following.    # Recent pseudomonas bacteremia  # Necrotic mouth ulcer, resolving  Had profound neutropenia and pseudomonas bacteremia several weeks ago in associated with necrotic mouth ulcer; neutropenia likely immunosuppression induced.  Neutropenia resolved last admission and pseudomonal bacteremia is likely cleared.  Necrotic ulcer appears to be resolving from prior admission.  Not currently active issues but will continue to monitor.    # Pulmonary nodules  Incidental on CT, will need interval follow up in 4-6 weeks.    # ESRD on TTS HD  - Nephrology consulted, appreciate assistance    # DM2   - Glargine, medium SSI    FEN: Advance as tolerated  PPx: Holding heparin  Dispo: PT/OT    Bobby Bearden MD  Internal Medicine PGY-3  [phone]    Patient discussed with Dr. Ernst.       Subjective:   Overnight no acute events.  This AM feeling well, eating breakfast.  States no acute concerns.          Medications:       acetaminophen  1,000 mg Oral 4x Daily     beta carotene  25,000 Units Oral BID     ciprofloxacin  500 mg Oral Q24H JEAN     fluticasone  1-2 spray Both Nostrils Daily     hydrALAZINE  75 mg Oral 4x Daily     insulin aspart  1-6 Units Subcutaneous TID w/meals     insulin aspart  1-5 Units Subcutaneous At Bedtime     insulin glargine  20 Units Subcutaneous QAM AC     linezolid  600 mg Oral Q12H JEAN     metroNIDAZOLE  500 mg Oral Q8H JEAN     mycophenolate  500 mg Oral BID     NEPHROCAPS  1 capsule Oral Daily     nystatin  1,000,000 Units Swish & Swallow 4x Daily     pantoprazole (PROTONIX) IV  40 mg Intravenous BID     predniSONE  10 mg Oral BID w/meals     sodium chloride (PF)  3 mL Intracatheter Q8H     sodium chloride (PF)  3 mL Intracatheter Q8H     tacrolimus  3 mg  Oral BID IS     triamcinolone   Topical BID     valGANciclovir  450 mg Oral Once per day on Tue Sat     vancomycin  125 mg Oral 4x Daily       IV fluid REPLACEMENT ONLY       IV fluid REPLACEMENT ONLY                 Objective:          Physical Exam:   Temp:  [97.5  F (36.4  C)-98.5  F (36.9  C)] 98  F (36.7  C)  Pulse:  [97] 97  Heart Rate:  [90-99] 98  Resp:  [16-18] 18  BP: (108-156)/() 150/92  SpO2:  [99 %-100 %] 100 %  I/O last 3 completed shifts:  In: 1070.8 [P.O.:920; I.V.:150.8]  Out: 450 [Stool:450]    Vitals:    08/22/18 0600 08/23/18 0631 08/24/18 0700 08/25/18 0652   Weight: 80.1 kg (176 lb 9.4 oz) 83.5 kg (184 lb 1.4 oz) 81.9 kg (180 lb 8.9 oz) 82.8 kg (182 lb 8.7 oz)    08/26/18 0624   Weight: 80.3 kg (177 lb 0.5 oz)     GEN: Laying in bed, NAD  PULM: CTAB  CV: Regular rate and rhythm.  JVP not elevated   ABD: Colostomy site without surrounding redness.  Small amount of soft dark stool in ostomy bag.  Incision C/D/I.  Normoactive bowel sounds.  Mild diffuse tenderness to palpation in lower quadrants improved from prior exam.  EXT: Trace lower extremity edema         Data:   BMP    Recent Labs  Lab 08/26/18  0653 08/25/18  0821 08/24/18  0634 08/23/18  0620   * 125* 127* 125*   POTASSIUM 4.3 3.9 3.9 3.6   CHLORIDE 94 90* 91* 90*   TRINI 8.0* 7.6* 7.9* 7.4*   CO2 26 26 24 23   BUN 38* 59* 49* 65*   CR 3.40* 4.30* 3.36* 4.25*   * 217* 202* 276*     LFTs    Recent Labs  Lab 08/20/18  1105   ALKPHOS 170*   AST 41   ALT 18   BILITOTAL 0.4   PROTTOTAL 6.1*   ALBUMIN 1.7*      CBC    Recent Labs  Lab 08/26/18  0653  08/25/18  0821 08/24/18  1715 08/24/18  0634   WBC 5.8  --  5.5 6.6 5.6   RBC 2.47*  --  2.29* 2.61* 2.47*   HGB 7.7*  < > 7.1* 7.9*  8.0* 7.6*   HCT 23.0*  --  21.0* 23.9* 22.7*   MCV 93  --  92 92 92   MCH 31.2  --  31.0 30.3 30.8   MCHC 33.5  --  33.8 33.1 33.5   RDW 18.7*  --  18.0* 18.2* 17.7*   PLT 74*  --  71* 78* 78*   < > = values in this interval not  displayed.  INR    Recent Labs  Lab 08/20/18  1105   INR 0.99

## 2018-08-27 NOTE — PROGRESS NOTES
Calorie Count  Intake recorded for: 8/26/2018   Kcals: 818  Protein: 33g  # Meals Recorded: 3 meals   (first - 25% honey, wheat roll, penne w/ meat sauce & parmesan)       (second - 50% banana bread, strawberry yogurt, flatbread pizza w/ marinara, mozzarella, sausage, parmesan)       (third - 100% jelly, oatmeal, brown, 50% peanut butter, 1 pancake  # Supplements Recorded: 0

## 2018-08-27 NOTE — PROGRESS NOTES
Colorectal Surgery Progress Note  POD#15    Subjective:  No acute events overnight. Continues to have dark colored ostomy output. Labs pending this am      Vitals:  Vitals:    08/26/18 1514 08/26/18 1914 08/26/18 2100 08/27/18 0000   BP: 116/68 (!) 150/92 143/85    BP Location: Left arm Left arm     Pulse:       Resp: 18 18 16 16   Temp: 97.5  F (36.4  C) 98  F (36.7  C)     TempSrc: Axillary Oral     SpO2: 100% 100%     Weight:       Height:           I/O:  I/O last 3 completed shifts:  In: 919.4 [P.O.:840; I.V.:79.4]  Out: 300 [Stool:300]    Physical Exam:  Gen: AAOx3, NAD, lying in bed, conversant  Pulm: Non-labored breathing  Abd: Soft, non-distended, non-tender   Incision C/D/I with dermabond    Stoma pink and viable with dark semi-solid stool and gas in bag  Ext:  Warm and well-perfused    BMP    Recent Labs  Lab 08/26/18  0653 08/25/18  0821 08/24/18  0634 08/23/18  0620 08/22/18  0741 08/21/18  0633 08/20/18  1105   * 125* 127* 125* 128* 128* 134   POTASSIUM 4.3 3.9 3.9 3.6 3.3* 3.8 3.6   CHLORIDE 94 90* 91* 90* 93* 94 98   CO2 26 26 24 23 24 23 26   BUN 38* 59* 49* 65* 46* 45* 21   CR 3.40* 4.30* 3.36* 4.25* 3.22* 3.66* 2.20*   * 217* 202* 276* 256* 294* 209*   MAG 1.9 1.7 1.8 2.1 1.8 2.2 1.7   PHOS  --   --  3.6  --  2.7 2.9 1.5*     CBC    Recent Labs  Lab 08/26/18  0653 08/25/18  1702 08/25/18  0821 08/24/18  1715 08/24/18  0634   WBC 5.8  --  5.5 6.6 5.6   HGB 7.7* 8.0* 7.1* 7.9*  8.0* 7.6*   HCT 23.0*  --  21.0* 23.9* 22.7*   PLT 74*  --  71* 78* 78*         ASSESSMENT: This is a 63 year old male with NICM s/p heart transplant 6/2018 on immunosuppressants, with ESRD on HD, R internal jugular thrombus previously on apixaban, T2DM, hx pseudomonal bacteremia admitted to cards service for painless BRBPR. Found to have diverticulitis with 7.8 cm pericolonic abscess without safe window for IR drain. 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 Post-op course complicated by ileus, and concern for bleeding with slowly down-trending Hgb. Ileus now resolved with return of bowel function. After initiation of anticoagulation, Hgb drifting secondary to bleeding from GI source, 1 U PRBC x 2 since 8/21. Appreciate care per primary team.     - Continue regular diet and encourage supplements as tolerated, patient will self adjust to avoid high fiber foods  - Miralax every day  - Will discuss with staff possible scope given persistent melena, would likely need bowel prep prior to procedure.    Sp Dunlap MD   PGY-3, GEN SURG      Patient was seen with fellow and to be discussed with staff

## 2018-08-27 NOTE — PLAN OF CARE
Problem: Patient Care Overview  Goal: Plan of Care/Patient Progress Review  Outcome: No Change  VSS, SR 90s.  A&Ox4, on RA.  Pt reports mild abdominal pain, but refuses pain medication.  Hgb 7.3.  Stool in ostomy bag remains dark black.  Pt changed appliance with WOC nurse this AM.  Pt up w/1.  Plan for dialysis tomorrow.  Will continue to monitor and notify Cards 2 with any concerns or questions.

## 2018-08-27 NOTE — PROGRESS NOTES
WOC Nurse Inpatient [facility]   WO Nurse Inpatient Adult     Follow Up Assessment   Assessment of new end Colostomy Stoma complication(s) none   Mucocutaneous junction; intact   Peristomal complication(s) small amt of bruising on left side   Pouch wear time:3-4 days,   Following today's visit:Patient /   is  able to demonstrate;       1. How to empty their pouch? Yes, needs practice      2. How to change their pouch?  Yes, needs practice      Objective data:  Patient history according to medical record:63 year old male with NICM s/p heart transplant 6/2018 on immunosuppressants, with ESRD on HD, R internal jugular thrombus previously on apixaban, T2DM, ongoing pseudomonal bacteremia admitted to cards service for painless BRBPR. Found to have diverticulitis with 7.8 cm pericolonic abscess without safe window for IR drain. 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 Post-op course complicated by ileus, now resolved with return of good bowel function. Was on heparin gtt for R internal jugular thrombus, however was stopped due to concern for bleeding with slowly downtrending Hgb    Current Diet/Nutrition:   Active Diet Order      Regular Diet Adult   TPN no   I/O last 3 completed shifts:  In: 890 [P.O.:840; I.V.:50]  Out: 300 [Stool:300]  Labs:      Recent Labs  Lab 08/27/18  0644   ALBUMIN 1.8*   HGB 7.5*   INR 1.07   WBC 5.2        Physical Exam:  Current pouching system:Shashi 1 piece flat 30% melted in 4 days  Reason for pouch change today: ostomy education and routine schedule  Stoma appearance: red, oval and moist  Stoma size; 35mm x 41mm with adequate protrusion   MCJ- minimal separation from 1-5 O'clock   Peristomal skin: mottled light purple bruising on left side- resolving  Stoma output : green/:brown and gas , small amount in the pouch  Abdominal   Assessment  soft , NG still in place? No  Surgical Site: open to air  Pain: Dull ache  Is patient still on a PCA No    Interventions:  Patient's chart evaluated.  Focus of today's visit: pouch change return demonstration and verbal instruction   Participant of teaching session today patient   Orders: Reviewed  Change made with ostomy management today: No- continued with [place] 1 pc flat CTF with ostomy paste  Patient/family: performed with verbal cues. Pt actively participated and did a great job, only needed assistance with measuring the stom and aligning the pouch in the center.  Supplies:Gathered and at bedside    Plan:  Learning needs: pouch change return demonstration and discharge instructions  Preparation for discharge: No discharge preparations started  Recommend home care? yes    Discussed plan of care with Patient and Nurse  Nursing to notify the Provider(s) and re-consult the WOC Nurse if new ostomy concerns or discharge planned before next planned WOC visit.    WOC Nurse will return: Tuesday for discharge instructions

## 2018-08-27 NOTE — PROGRESS NOTES
CLINICAL NUTRITION SERVICES - REASSESSMENT NOTE     Nutrition Prescription    RECOMMENDATIONS FOR MDs/PROVIDERS TO ORDER:  Order nephrocaps as pt is on dialysis.    Malnutrition Status:    Non-severe malnutrition in the context of acute illness/injury on chronic illness    Future/Additional Recommendations:  1. Continue diet order as per surgery team.   2. Monitor BG control. DM II hx, Endo has followed in the past. Low BG this admission, at times. Hgb A1c of 7 on both 7/18/18 and previously on 4/26/18.   3. Assess if pt needs calcium/vitamin D, if remains on prednisone. Per nephrology post-op Tx, pt was getting calcium during dialysis and calcitriol during runs.   4. Consider checking vitamin B12 status.  5. If TFs are needed, see nutrition note 8/24 for recs (adjust timing if needed, if pt continues to be on cipro). If parenteral nutrition (PN) is needed, see nutrition assessment note 8/13 for central PN recs via a central line, to provide 100% of nutrition needs at goal vs restart recent peripheral PN regimen.      EVALUATION OF THE PROGRESS TOWARD GOALS   Diet: Regular diet order since 8/26. NGT was removed on 8/21 and diet was advanced to clear liquids and then to full liquids on 8/22 before he was ordered a low fiber diet 8/23. Has been on a regular diet order since 8/26 am (surgery team aware and ordered this diet to liberalize and encourage oral intake as per pt preference).  Intake: Good diet tolerance. Flowsheets indicate pt with a  Poor appetite on 8/21, 75% of a meal with a fair appetite 8/22, 100% of a meal with a good appetite 8/23, 50% of a meal with a fair appetite 8/24, 50-75% of a meal with a fair to good appetite 8/25, 25-50% of meals with a fair appetite 8/26, and 50% of meals with a good appetite 8/27. Factors affecting oral intake include diet advancement, previous diet restrictions, and food choices in the hospital (states meat is tough). Pt states he is ordering an average of one Boost Plus  daily, chocolate. He states his appetite is getting a little better. Does have early satiety.      Kcal counts:   8/22   382 kcals and 14 g protein (meal/s and 50% of one Boost Breeze and 50% of one Boost Plus supplement/s recorded)   8/23   456 kcals and 8 g protein (meal/s and no supplement/s recorded) - [employer]  indicates food/beverages sent totaled 1472 kcals and 23 g protein daily on average.    8/24   227 kcals and 4 g protein (meal/s and no supplement/s recorded)   8/25     51 kcals and 1 g protein (meal/s and no supplement/s recorded)   8/26   818 kcals and 33 g protein (three meal/s and no supplement/s recorded)   * Pt consumed a five-day average of 387 kcals and 12 g protein daily. This does not meet estimated needs of 5963-3115 kcals/day (25 - 30 kcals/kg) and  grams protein/day (1.2 - 1.8 grams of pro/kg). [employer] indicates food/beverages sent 8/24-8/26 totaled 7936-1592 kcals and 26-98 g protein daily on average.     Nutrition Support: Pt was ordered to receive peripheral parenteral nutrition (PPN) from 8/19-8/23, 960 mL/day x 24 hours with 55 g dextrose (GIR 0.5), 40 g AA (0.5 g protein/kg) + 250 mL 20% IV 5 days/week which provided 704 kcal (9 kcal/kg) and 51% kcal from fat on average which does not provide adequate nutrition intake (unable to assess parenteral nutrition intake as pt with more than adequate PN at times and then inadequate PN on other days).    NEW FINDINGS   N/A     MALNUTRITION  % Intake: </= 50% for >/= 5 days (severe)  % Weight Loss: Difficult to assess wt changes due to changes in fluid status, pt on dialysis.   Subcutaneous Fat Loss: Facial region:  mild  Muscle Loss: Temporal, Scapular bone and Thoracic region (clavicle, acromium bone, deltoid, trapezius, pectoral): mild  Fluid Accumulation/Edema: None noted  Malnutrition Diagnosis: Non-severe malnutrition in the context of acute illness/injury on chronic illness    Previous Goals   Total avg nutritional intake to  meet a minimum of 25 kcal/kg and 1.2 g PRO/kg daily (per dosing wt 75 kg).  Evaluation: Not met    Previous Nutrition Diagnosis  Inadequate parenteral nutrition infusion related to pt without central line access for TPN (due to concerns of infection/endocardiditis) with recent hx of heart transplant on 6/26/2018 and now with post op ileus s/p colectomy as evidenced by pt NPO x 8 days, now on PPN that is meeting ~ 38% of kcal needs (9.4 kcal/kg) and 44% of protein needs (0.5 gm/kg).     Evaluation: Pt no longer on parenteral nutrition. Changed to new nutrition dx below .    CURRENT NUTRITION DIAGNOSIS  Inadequate oral intake related to diet advancement, previous diet restrictions,early satiety, and food choices as evidenced by pt consuming about 50% of meals.    INTERVENTIONS  Implementation  Nutrition education: Encouraged high protein options. Gave examples of possible foods/beverages to try. Explained previous low-fiber diet order (provided Nutrition Care Manual handout on Low Fiber Nutrition Therapy). Rec chew all foods well and avoid high fiber foods, as able. Encouraged small, frequent meals.    Collaboration with other providers: Discussed results of kcal counts with team.   Modify composition of meals/snacks: Pt declined visit from  or supervisor and states he has the phone number of the manager or supervisor if needed.     Goals  Patient to consume % of nutritionally adequate meal trays TID, or the equivalent with supplements/snacks.    Monitoring/Evaluation  Progress toward goals will be monitored and evaluated per protocol.     Nutrition will continue to follow.     Mary Silva, MS, RD, LD, [facility]   6C Pgr: [phone]

## 2018-08-28 LAB
ANION GAP SERPL CALCULATED.3IONS-SCNC: 10 MMOL/L (ref 3–14)
BASOPHILS # BLD AUTO: 0 10E9/L (ref 0–0.2)
BASOPHILS NFR BLD AUTO: 0 %
BUN SERPL-MCNC: 66 MG/DL (ref 7–30)
CALCIUM SERPL-MCNC: 7.9 MG/DL (ref 8.5–10.1)
CHLORIDE SERPL-SCNC: 94 MMOL/L (ref 94–109)
CO2 SERPL-SCNC: 25 MMOL/L (ref 20–32)
CREAT SERPL-MCNC: 5.82 MG/DL (ref 0.66–1.25)
DIFFERENTIAL METHOD BLD: ABNORMAL
EOSINOPHIL # BLD AUTO: 0 10E9/L (ref 0–0.7)
EOSINOPHIL NFR BLD AUTO: 0.6 %
ERYTHROCYTE [DISTWIDTH] IN BLOOD BY AUTOMATED COUNT: 19.2 % (ref 10–15)
GFR SERPL CREATININE-BSD FRML MDRD: 10 ML/MIN/1.7M2
GLUCOSE BLDC GLUCOMTR-MCNC: 130 MG/DL (ref 70–99)
GLUCOSE BLDC GLUCOMTR-MCNC: 186 MG/DL (ref 70–99)
GLUCOSE BLDC GLUCOMTR-MCNC: 226 MG/DL (ref 70–99)
GLUCOSE BLDC GLUCOMTR-MCNC: 264 MG/DL (ref 70–99)
GLUCOSE SERPL-MCNC: 142 MG/DL (ref 70–99)
HCT VFR BLD AUTO: 21.5 % (ref 40–53)
HGB BLD-MCNC: 7 G/DL (ref 13.3–17.7)
IMM GRANULOCYTES # BLD: 0 10E9/L (ref 0–0.4)
IMM GRANULOCYTES NFR BLD: 0.2 %
LYMPHOCYTES # BLD AUTO: 0.3 10E9/L (ref 0.8–5.3)
LYMPHOCYTES NFR BLD AUTO: 5.1 %
MCH RBC QN AUTO: 31.3 PG (ref 26.5–33)
MCHC RBC AUTO-ENTMCNC: 32.6 G/DL (ref 31.5–36.5)
MCV RBC AUTO: 96 FL (ref 78–100)
MONOCYTES # BLD AUTO: 0.6 10E9/L (ref 0–1.3)
MONOCYTES NFR BLD AUTO: 11.9 %
NEUTROPHILS # BLD AUTO: 4.2 10E9/L (ref 1.6–8.3)
NEUTROPHILS NFR BLD AUTO: 82.2 %
NRBC # BLD AUTO: 0 10*3/UL
NRBC BLD AUTO-RTO: 0 /100
PLATELET # BLD AUTO: 73 10E9/L (ref 150–450)
POTASSIUM SERPL-SCNC: 5.2 MMOL/L (ref 3.4–5.3)
RBC # BLD AUTO: 2.24 10E12/L (ref 4.4–5.9)
SODIUM SERPL-SCNC: 129 MMOL/L (ref 133–144)
TACROLIMUS BLD-MCNC: 7.7 UG/L (ref 5–15)
TME LAST DOSE: NORMAL H
WBC # BLD AUTO: 5.1 10E9/L (ref 4–11)

## 2018-08-28 PROCEDURE — 25000125 ZZHC RX 250: Performed by: STUDENT IN AN ORGANIZED HEALTH CARE EDUCATION/TRAINING PROGRAM

## 2018-08-28 PROCEDURE — 25000132 ZZH RX MED GY IP 250 OP 250 PS 637: Mod: GY | Performed by: NURSE PRACTITIONER

## 2018-08-28 PROCEDURE — 25000132 ZZH RX MED GY IP 250 OP 250 PS 637: Mod: GY

## 2018-08-28 PROCEDURE — 36415 COLL VENOUS BLD VENIPUNCTURE: CPT | Performed by: STUDENT IN AN ORGANIZED HEALTH CARE EDUCATION/TRAINING PROGRAM

## 2018-08-28 PROCEDURE — 21400003 ZZH R&B CCU CRITICAL UMMC

## 2018-08-28 PROCEDURE — 63400005 ZZH RX 634: Performed by: INTERNAL MEDICINE

## 2018-08-28 PROCEDURE — 90937 HEMODIALYSIS REPEATED EVAL: CPT

## 2018-08-28 PROCEDURE — A9270 NON-COVERED ITEM OR SERVICE: HCPCS | Mod: GY | Performed by: STUDENT IN AN ORGANIZED HEALTH CARE EDUCATION/TRAINING PROGRAM

## 2018-08-28 PROCEDURE — 25000128 H RX IP 250 OP 636: Performed by: INTERNAL MEDICINE

## 2018-08-28 PROCEDURE — A9270 NON-COVERED ITEM OR SERVICE: HCPCS | Mod: GY

## 2018-08-28 PROCEDURE — 97602 WOUND(S) CARE NON-SELECTIVE: CPT

## 2018-08-28 PROCEDURE — 25000132 ZZH RX MED GY IP 250 OP 250 PS 637: Mod: GY | Performed by: INTERNAL MEDICINE

## 2018-08-28 PROCEDURE — 99232 SBSQ HOSP IP/OBS MODERATE 35: CPT | Performed by: NURSE PRACTITIONER

## 2018-08-28 PROCEDURE — 80197 ASSAY OF TACROLIMUS: CPT | Performed by: STUDENT IN AN ORGANIZED HEALTH CARE EDUCATION/TRAINING PROGRAM

## 2018-08-28 PROCEDURE — A9270 NON-COVERED ITEM OR SERVICE: HCPCS | Mod: GY | Performed by: INTERNAL MEDICINE

## 2018-08-28 PROCEDURE — 25000131 ZZH RX MED GY IP 250 OP 636 PS 637: Mod: GY | Performed by: INTERNAL MEDICINE

## 2018-08-28 PROCEDURE — 00000146 ZZHCL STATISTIC GLUCOSE BY METER IP

## 2018-08-28 PROCEDURE — A9270 NON-COVERED ITEM OR SERVICE: HCPCS | Mod: GY | Performed by: NURSE PRACTITIONER

## 2018-08-28 PROCEDURE — 25000125 ZZHC RX 250: Performed by: NURSE PRACTITIONER

## 2018-08-28 PROCEDURE — 80048 BASIC METABOLIC PNL TOTAL CA: CPT | Performed by: STUDENT IN AN ORGANIZED HEALTH CARE EDUCATION/TRAINING PROGRAM

## 2018-08-28 PROCEDURE — 25000132 ZZH RX MED GY IP 250 OP 250 PS 637: Mod: GY | Performed by: COLON & RECTAL SURGERY

## 2018-08-28 PROCEDURE — 25000128 H RX IP 250 OP 636: Performed by: NURSE PRACTITIONER

## 2018-08-28 PROCEDURE — 85025 COMPLETE CBC W/AUTO DIFF WBC: CPT | Performed by: STUDENT IN AN ORGANIZED HEALTH CARE EDUCATION/TRAINING PROGRAM

## 2018-08-28 PROCEDURE — 25000132 ZZH RX MED GY IP 250 OP 250 PS 637: Mod: GY | Performed by: STUDENT IN AN ORGANIZED HEALTH CARE EDUCATION/TRAINING PROGRAM

## 2018-08-28 RX ORDER — PREDNISONE 10 MG/1
10 TABLET ORAL DAILY
Status: DISCONTINUED | OUTPATIENT
Start: 2018-08-29 | End: 2018-09-07

## 2018-08-28 RX ORDER — PREDNISONE 10 MG/1
10 TABLET ORAL ONCE
Status: COMPLETED | OUTPATIENT
Start: 2018-08-28 | End: 2018-08-28

## 2018-08-28 RX ORDER — ACETAMINOPHEN 500 MG
1000 TABLET ORAL EVERY 6 HOURS PRN
Status: DISCONTINUED | OUTPATIENT
Start: 2018-08-28 | End: 2018-09-11 | Stop reason: HOSPADM

## 2018-08-28 RX ORDER — PREDNISONE 5 MG/1
5 TABLET ORAL DAILY
Status: DISCONTINUED | OUTPATIENT
Start: 2018-08-28 | End: 2018-09-07

## 2018-08-28 RX ORDER — PANTOPRAZOLE SODIUM 40 MG/1
40 TABLET, DELAYED RELEASE ORAL
Status: DISCONTINUED | OUTPATIENT
Start: 2018-08-29 | End: 2018-09-11 | Stop reason: HOSPADM

## 2018-08-28 RX ADMIN — INSULIN ASPART 1 UNITS: 100 INJECTION, SOLUTION INTRAVENOUS; SUBCUTANEOUS at 12:26

## 2018-08-28 RX ADMIN — INSULIN GLARGINE 20 UNITS: 100 INJECTION, SOLUTION SUBCUTANEOUS at 07:31

## 2018-08-28 RX ADMIN — SODIUM CHLORIDE 1000 ML: 9 INJECTION, SOLUTION INTRAVENOUS at 15:27

## 2018-08-28 RX ADMIN — VANCOMYCIN HYDROCHLORIDE 125 MG: KIT at 07:31

## 2018-08-28 RX ADMIN — NYSTATIN 1000000 UNITS: 100000 SUSPENSION ORAL at 20:49

## 2018-08-28 RX ADMIN — MYCOPHENOLATE MOFETIL 500 MG: 250 CAPSULE ORAL at 20:49

## 2018-08-28 RX ADMIN — Medication 1 CAPSULE: at 12:21

## 2018-08-28 RX ADMIN — VANCOMYCIN HYDROCHLORIDE 125 MG: KIT at 15:27

## 2018-08-28 RX ADMIN — HYDRALAZINE HYDROCHLORIDE 75 MG: 50 TABLET ORAL at 12:21

## 2018-08-28 RX ADMIN — NYSTATIN 1000000 UNITS: 100000 SUSPENSION ORAL at 07:43

## 2018-08-28 RX ADMIN — ATORVASTATIN CALCIUM 20 MG: 20 TABLET, FILM COATED ORAL at 20:49

## 2018-08-28 RX ADMIN — EPOETIN ALFA 7600 UNITS: 10000 SOLUTION INTRAVENOUS; SUBCUTANEOUS at 11:23

## 2018-08-28 RX ADMIN — VANCOMYCIN HYDROCHLORIDE 125 MG: KIT at 12:21

## 2018-08-28 RX ADMIN — SODIUM CHLORIDE 250 ML: 9 INJECTION, SOLUTION INTRAVENOUS at 08:35

## 2018-08-28 RX ADMIN — PREDNISONE 10 MG: 10 TABLET ORAL at 13:25

## 2018-08-28 RX ADMIN — VANCOMYCIN HYDROCHLORIDE 125 MG: KIT at 20:49

## 2018-08-28 RX ADMIN — PREDNISONE 5 MG: 5 TABLET ORAL at 20:49

## 2018-08-28 RX ADMIN — PANTOPRAZOLE SODIUM 40 MG: 40 INJECTION, POWDER, FOR SOLUTION INTRAVENOUS at 13:25

## 2018-08-28 RX ADMIN — VALGANCICLOVIR 450 MG: 450 TABLET, FILM COATED ORAL at 17:22

## 2018-08-28 RX ADMIN — INSULIN ASPART 2 UNITS: 100 INJECTION, SOLUTION INTRAVENOUS; SUBCUTANEOUS at 17:27

## 2018-08-28 RX ADMIN — NYSTATIN 1000000 UNITS: 100000 SUSPENSION ORAL at 15:27

## 2018-08-28 RX ADMIN — MYCOPHENOLATE MOFETIL 500 MG: 250 CAPSULE ORAL at 07:31

## 2018-08-28 RX ADMIN — Medication 50 MG: at 22:09

## 2018-08-28 RX ADMIN — SODIUM CHLORIDE 300 ML: 9 INJECTION, SOLUTION INTRAVENOUS at 08:35

## 2018-08-28 RX ADMIN — NYSTATIN 1000000 UNITS: 100000 SUSPENSION ORAL at 12:21

## 2018-08-28 RX ADMIN — TACROLIMUS 3 MG: 1 CAPSULE ORAL at 17:22

## 2018-08-28 RX ADMIN — TACROLIMUS 3 MG: 1 CAPSULE ORAL at 07:31

## 2018-08-28 ASSESSMENT — ACTIVITIES OF DAILY LIVING (ADL)
ADLS_ACUITY_SCORE: 12

## 2018-08-28 NOTE — PROGRESS NOTES
"CLINICAL NUTRITION SERVICES     Received provider order for nutrition education. \"Low residue diet teaching. Will need to continue for 1 month. Thx!\"    See nutrition note 8/27. Pt received verbal and written nutrition education on low fiber diet.      Follow up/Monitoring:  Will continue to follow pt.    Mary Silva, MS, RD, LD, [facility]   6C Pgr: [phone]           "

## 2018-08-28 NOTE — PLAN OF CARE
Problem: Patient Care Overview  Goal: Plan of Care/Patient Progress Review  Outcome: No Change  D: Pt admitted 8/12 with pericolonic abcesses now s/p colostomy. Hx heart txp 6/14/18.  I: Monitored vitals and assessed pt status.  A: A0x4. VSS on RA. SR/ST on tele, HR 90s-100s. Endorses mild abd pain, but states it is improving from earlier and declines interventions. Denies SOB. Up Ax1. On HD, next run planned today. Pt refused VS and BG checks overnight, expresses desire to be able to sleep for longer period of time uninterrupted. Pt slept between cares, making needs known.  P: Continue to monitor and report changes/concerns to Cards 2.

## 2018-08-28 NOTE — PROGRESS NOTES
Nephrology Progress Note  08/28/2018       Murray Nicholson is a 63 year old male with Heart transplant 6/18, ESRD on HD, HTN, Right internal jugular thrombus on Aphixaban, recent admission 7/26-8/6/18  for pseudomonas bacteremia felt to be 2/2 probable prececal colitis and necrotic mouth ulcer, admitted 8/12/18 with abdominal pain, bloody stool and fever, found to have C diff infection, anorectal abscess and pericolonic abscess.        ASSESSMENT AND RECOMMENDATIONS:       ESRD - Etiology of ESRD 2/2 HTN, DM, CRS.   Has chronic OP HD at [facility], TTS schedule, under care of Dr Mcpherson. Right TDC, EDW 76 kg (increasing to 79 kg)   - Will continue TTS schedule   - No heparin, melena reported      Volume status: EDW 79.0 kg. Appears mildly hypervolemic, however RHC on 8/24 with low filling pressures. Anuric. Has colostomy.   - Continue pre run weights, standing   - Strict I/O  -  Will need to increase EDW based on RHC with low filling pressures (weight of 79 kg post HD today, may need to be increased even further)    Hypervolemic hyponatremia: ESRD and inability to excrete free water        BP: normotensive         Transplant IS regimen: Tac, MMF, Pred.       BMD: Ca 7.6, alb 1.7 (8/20), Phos 3.6         Anemia, acute on chronic: hgb 7.0, melena resolving. On Epo 7600 q run.    - Continue epogen         Pericolonic abscess, small perircal abscess 2.7 cm.- Underwent I/D anorectal abscess on 8/13 and  Sigmoidectomy/end colostomy on 8/14. Also has C diff, on PO vanc. Antimicrobials per primary. CT suggestive of an ileus and NG tube was placed, now resolved and NG tube out      Lung nodules - New Right lower lobe pulmonary nodules seen on CT this admission. ID recommending close f/u.         Recommendations were communicated to primary team via progress note and text page.          Interval History :   Seen on dialysis, pt generally weak and not feeling well. Denies CP, SOB. Per RHC on 8/24, low filling pressures,  "will need to increase EDW; he is currently 79 kg over EDW post dialysis but may need to increase even more.       Review of Systems:   4 point ROS negative other than as noted above.    Physical Exam:   I/O last 3 completed shifts:  In: 480 [P.O.:480]  Out: 3325 [Urine:150; Other:3000; Stool:175]   BP 95/59 (BP Location: Left arm)  Pulse 102  Temp 98.5  F (36.9  C) (Oral)  Resp 16  Ht 1.753 m (5' 9\")  Wt 78.9 kg (173 lb 15.1 oz)  SpO2 100%  BMI 25.69 kg/m2     GENERAL APPEARANCE: alert, NAD  EYES: no scleral icterus, pupils equal  Pulmonary: lungs CTA  CV: RRR   - Edema - trace LE, increasing facial edema  GI: soft, colostomy/stoma   MS: no evidence of inflammation in joints, no muscle tenderness  SKIN: no rash, warm, dry, no cyanosis  NEURO: alert/oriented  ACCESS: Right TDC    Labs:   All labs reviewed by me  Electrolytes/Renal -   Recent Labs   Lab Test  08/28/18   0710  08/27/18   0644  08/26/18   0653  08/25/18   0821  08/24/18   0634   08/22/18   0741   NA  129*  129*  130*  125*  127*   < >  128*   POTASSIUM  5.2  4.7  4.3  3.9  3.9   < >  3.3*   CHLORIDE  94  94  94  90*  91*   < >  93*   CO2  25  24  26  26  24   < >  24   BUN  66*  56*  38*  59*  49*   < >  46*   CR  5.82*  4.66*  3.40*  4.30*  3.36*   < >  3.22*   GLC  142*  185*  165*  217*  202*   < >  256*   TRINI  7.9*  7.9*  8.0*  7.6*  7.9*   < >  7.3*   MAG   --   2.3  1.9  1.7  1.8   < >  1.8   PHOS   --   3.1   --    --   3.6   --   2.7    < > = values in this interval not displayed.       CBC -   Recent Labs   Lab Test  08/28/18 0710 08/27/18 0644  08/26/18   0653   WBC  5.1  5.2  5.8   HGB  7.0*  7.5*  7.7*   PLT  73*  91*  74*       LFTs -   Recent Labs   Lab Test  08/27/18   0644  08/20/18   1105  08/14/18   0620   ALKPHOS  133  170*  110   BILITOTAL  0.5  0.4  0.3   ALT  13  18  17   AST  16  41  29   PROTTOTAL  5.2*  6.1*  5.3*   ALBUMIN  1.8*  1.7*  1.6*       Iron Panel -   Recent Labs   Lab Test  07/10/18   0711  05/08/18   " 0954  07/19/17   1306   IRON  66  58  46   IRONSAT  28  27  18   ALBERTINA  771*  621*  369       Current Medications:    sodium chloride 0.9%  1,000 mL Intravenous Once     atorvastatin  20 mg Oral QPM     fluticasone  1-2 spray Both Nostrils Daily     hydrALAZINE  75 mg Oral Q8H JEAN     insulin aspart  1-6 Units Subcutaneous TID w/meals     insulin aspart  1-5 Units Subcutaneous At Bedtime     insulin glargine  20 Units Subcutaneous QAM AC     mycophenolate  500 mg Oral BID     NEPHROCAPS  1 capsule Oral Daily     nystatin  1,000,000 Units Swish & Swallow 4x Daily     [START ON 8/29/2018] pantoprazole  40 mg Oral QAM AC     [START ON 8/29/2018] predniSONE  10 mg Oral Daily     predniSONE  5 mg Oral Daily     sodium chloride (PF)  3 mL Intracatheter Q8H     sodium chloride (PF)  3 mL Intracatheter Q8H     tacrolimus  3 mg Oral BID IS     triamcinolone   Topical BID     valGANciclovir  450 mg Oral Once per day on Tue Sat     vancomycin  125 mg Oral 4x Daily       IV fluid REPLACEMENT ONLY       IV fluid REPLACEMENT ONLY       Kristi Armas PA-C

## 2018-08-28 NOTE — PLAN OF CARE
Problem: Patient Care Overview  Goal: Plan of Care/Patient Progress Review  OT/6C - Cancel. Pt initially agreeable to therapy, discussing plan for LE ergometer. Pt then adamantly declining due to meal tray arrival. Will reschedule as appropriate.

## 2018-08-28 NOTE — PROGRESS NOTES
HEMODIALYSIS TREATMENT NOTE    Date: 8/28/2018  Time: 12:05 PM    Data:  Pre Wt: 81.6 kg (179 lb 14.3 oz)   Desired Wt: 77.6 kg   Post Wt: 78.9 kg (173 lb 15.1 oz  Weight change: 2.7 kg  Ultrafiltration - Post Run Net Total Removed (mL): 3000 mL  Ultrafiltration - Post Run Net Total Gain (mL): 0 mL  Vascular Access Status: Yes, secured and visible  Dialyzer Rinse: Streaked, Light  Total Blood Volume Processed: 78L   Total Dialysis (Treatment) Time:  3.5 hours    Lab:   No    Interventions:  Patient dialyzed 3.5 hrs via CVC with blood flow rate of 350ml/min. UF goal reduced from 4kg to 3kg due to low BP. Net UF 3kg. Hand off report given to floor RN.     Assessment:  Next HD run per renal team.      Plan:    Next HD run per renal team.

## 2018-08-28 NOTE — PLAN OF CARE
Problem: Patient Care Overview  Goal: Plan of Care/Patient Progress Review  D:pt needs assistance to stand and to ambulate  A:pt states fatigue and lightheaded at times  P:pace activities, increase movement

## 2018-08-28 NOTE — PROGRESS NOTES
Colorectal Surgery Progress Note  POD#16    Subjective:  Dizziness noted yesterday with sitting up and ambulating. Increased participation in therapies. HD planned for today. Slight decrease in oral intake yesterday.    Vitals:  Vitals:    08/27/18 1643 08/27/18 2009 08/28/18 0000 08/28/18 0427   BP: 109/72 130/82 (!) 140/98    BP Location: Left arm Left arm Left arm    Pulse: 102 101     Resp: 16 16 16 16   Temp: 97.8  F (36.6  C) 98.7  F (37.1  C) 98.4  F (36.9  C)    TempSrc: Oral Oral Oral    SpO2: 100% 100% 100%    Weight:       Height:           I/O:  I/O last 3 completed shifts:  In: 600 [P.O.:600]  Out: 225 [Urine:200; Stool:25]    Physical Exam:  Gen: AAOx3, NAD, lying in bed, conversant  Pulm: Non-labored breathing  Abd: Soft, non-distended, non-tender   Incision C/D/I with dermabond    Stoma pink and viable with dark semi-solid stool and gas in bag  Ext:  Warm and well-perfused    BMP    Recent Labs  Lab 08/27/18  0644 08/26/18  0653 08/25/18  0821 08/24/18  0634  08/22/18  0741 08/21/18  0633   * 130* 125* 127*  < > 128* 128*   POTASSIUM 4.7 4.3 3.9 3.9  < > 3.3* 3.8   CHLORIDE 94 94 90* 91*  < > 93* 94   CO2 24 26 26 24  < > 24 23   BUN 56* 38* 59* 49*  < > 46* 45*   CR 4.66* 3.40* 4.30* 3.36*  < > 3.22* 3.66*   * 165* 217* 202*  < > 256* 294*   MAG 2.3 1.9 1.7 1.8  < > 1.8 2.2   PHOS 3.1  --   --  3.6  --  2.7 2.9   < > = values in this interval not displayed.  CBC    Recent Labs  Lab 08/27/18  0644 08/26/18  0653 08/25/18  1702 08/25/18  0821 08/24/18  1715   WBC 5.2 5.8  --  5.5 6.6   HGB 7.5* 7.7* 8.0* 7.1* 7.9*  8.0*   HCT 22.8* 23.0*  --  21.0* 23.9*   PLT 91* 74*  --  71* 78*         ASSESSMENT: This is a 63 year old male with NICM s/p heart transplant 6/2018 on immunosuppressants, with ESRD on HD, R internal jugular thrombus previously on apixaban, T2DM, hx pseudomonal bacteremia admitted to cards service for painless BRBPR. Found to have diverticulitis with 7.8 cm pericolonic  abscess without safe window for IR drain. 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 Post-op course complicated by ileus, and concern for bleeding after initiation of anticoagulation, with slowly down-trending Hgb. Ileus now resolved with return of bowel function. Appreciate care per primary team.     - Recommend reviewing medications with primary team to see if can improve dizziness and encouraged oral intake  - Continue low residue diet and encourage supplements as tolerated  - Schedule Miralax daily  - Timing of endoscopy to be determined    Sp Dunlap MD   PGY-3, GEN SURG      Patient was seen with fellow and to be discussed with staff

## 2018-08-28 NOTE — PLAN OF CARE
Problem: Patient Care Overview  Goal: Plan of Care/Patient Progress Review  PT: pt eating at time checked in with PT. Pt wanting to cont eating due to be gone for most of AM.   CX PT session for today.

## 2018-08-28 NOTE — PROGRESS NOTES
WO Nurse Inpatient [facility]   WO Nurse Inpatient Adult     Follow Up Assessment   Assessment of new end Colostomy Stoma complication(s) none   Mucocutaneous junction; intact   Peristomal complication(s) small amt of bruising on left side   Pouch wear time:3-4 days,   Following today's visit:Patient /   is  able to demonstrate;       1. How to empty their pouch? Yes, needs physical practice      2. How to change their pouch?  Yes, needs practice      Objective data:  Patient history according to medical record:63 year old male with NICM s/p heart transplant 6/2018 on immunosuppressants, with ESRD on HD, R internal jugular thrombus previously on apixaban, T2DM, ongoing pseudomonal bacteremia admitted to cards service for painless BRBPR. Found to have diverticulitis with 7.8 cm pericolonic abscess without safe window for IR drain. 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 Post-op course complicated by ileus, now resolved with return of good bowel function. Was on heparin gtt for R internal jugular thrombus, however was stopped due to concern for bleeding with slowly downtrending Hgb    Current Diet/Nutrition:   Active Diet Order      Low Fiber Diet   TPN no   I/O last 3 completed shifts:  In: 600 [P.O.:600]  Out: 225 [Urine:200; Stool:25]  Labs:      Recent Labs  Lab 08/28/18  0710 08/27/18  0644   ALBUMIN  --  1.8*   HGB 7.0* 7.5*   INR  --  1.07   WBC 5.1 5.2        Physical Exam:  Current pouching system:Shashi 1 piece flat 30% melted in 4 days  Reason for pouch change today: ostomy education and routine schedule  Stoma appearance: red, oval and moist  Stoma size; 35mm x 41mm with adequate protrusion   MCJ- minimal separation from 1-5 O'clock   Peristomal skin: mottled light purple bruising on left side- resolving  Stoma output : moderate amount of dark  green bilious output from the stoma   Abdominal  Assessment  soft ,   NG still in place? No  Surgical Site: not assessed   Pain: patient denies pain today   Is patient still on a PCA No    Interventions:  Patient's chart evaluated.  Focus of today's visit: completing how to order ostomy supplies, how to empty his ostomy appliance along with various methods of pouch emptying.  Participant of teaching session today patient   Orders: Reviewed  Change made with ostomy management today: No- continued with Shashi 1 pc flat cut to fit fecal pouch   Patient/family: active participation and performed with minimal verbal cues.   Pt actively participated and did a great job, only needed assistance with measuring the stom and aligning the pouch in the center.  Supplies:Gathered and at bedside    Plan:  Learning needs: pouch change return demonstration and discharge instructions  Preparation for discharge: No discharge preparations started  Recommend home care? yes    Discussed plan of care with Patient and Nurse  Nursing to notify the Provider(s) and re-consult the WOC Nurse if new ostomy concerns or discharge planned before next planned WOC visit.    WOC Nurse will return:Wednesday for discharge instructions

## 2018-08-28 NOTE — PROGRESS NOTES
CLINICAL NUTRITION SERVICES    INTERVENTIONS:  Implementation:  Medical food supplement therapy: Scheduled chocolate Boost Plus at HS (Note, pt usually orders this for his lunch as well. Pt to receive this oral supplement twice daily).     Follow up/Monitoring:  Will continue to follow pt.    Mary Silva, MS, RD, LD, [facility]   6C Pgr: [phone]

## 2018-08-28 NOTE — PLAN OF CARE
Problem: Patient Care Overview  Goal: Plan of Care/Patient Progress Review  Outcome: No Change  VSS, SR 90s.  A&Ox4, on RA.  Pt reports mild abdominal pain, but refuses intervention.  Hgb 7.0.  Plan to transfuse if Hgb<7.  Stool in ostomy bag remains dark black.  Pt up w/1.  Pt continues to endorse feeling lightheaded and dizzy when standing up.  1 L NS bolus given.  Blood sugars assessed QID and managed w/ sliding scale insulin.  Will continue to monitor and notify Cards 2 with any concerns or questions.

## 2018-08-28 NOTE — PROGRESS NOTES
[facility]   Cardiology II Service / Advanced Heart Failure  Daily Progress Note      Patient: Murray Nicholson  MRN: [id number]  Admission Date: 8/12/2018  Hospital Day # 16    Assessment and Plan: Murray Nicholson is a 63 year old male with a h/o NICM s/p heart txp (6/14/18), ESRD on HD, R internal jugular thrombus on apixaban and DM2 p/w 3-4 bright red BM and fevers/chills, found to have a daniella-colonic abscess (7.8 cm), daniella-rectal abscess (2.7 cm), probable diverticulitis, and R colon colitis.    Today's Plan:  -1L NS bolus given orthostasis and low filling pressures last Friday  -decrease pred per taper to 10/5 mg for negative bx  -decrease hydralazine to TID with hold parameters   -recheck DSAs  -monitor Hgb, attempting to avoid blood transfusion due to risk of sensitization     # Daniella-colonic abscess (7.8 cm) s/p laparoscopic assisted sigmoid colectomy with end colostomy 8/14) positive for VRE  # Daniella-rectal abscess (2.7 cm) s/p drainage 8/12  # R colon colitis  # Positive c. Diff  - s/p two week course cipro, linezolid, flagyl 8/14-8/27  - Continue PO vanc x 10 days after abx d/c'ed (through 9/6)  - Miralax daily per GI  - Acetaminophen, tramadol PRN pain  - Colorectal, transplant ID following, appreciate assistance  - Low residual diet with supplements     # Recent right internal jugular line-associated thrombus  # Acute anemia  # Melena  Noted to have melena in ostomy, holding heparin gtt since 8/22. Transfuse for Hgb <7.  - Change PPI to oral  - Continue to hold heparin gtt  - Trend Hgb daily, no transfusion for several days   - Per CRS, no scope due to risk of disruption anastomosis     # Status post OHT on 6/13/18, history of ICM  # Donor three vessel CAD  Immunosuppression:   --decrease prednisone 10 mg in AM dn 5 mg in PM, biopsy negative 8/24  --continue Cellcept 500 mg BID  --continue tacrolimus 3 mg bid (goal 6-8 due to infection), daily troughs - pending today    Prophylaxis:   --CAV:  aspirin, atorvastatin 20 mg daily (cost concern with rosuva)  --Thrush: Nystatin swish and swallow  --PCP: s/p pentamidine 8/17, due 9/14  --GI: Protonix   --CMV: valcyte 450 mg Tues and Sat, renally dosed, ideally 6 mos post-Tx  --Bones: calcium/vitamin D      Serostatus: HSV1-, HSV1+, CMV D+/R-, EBV D?/R+, VZV+    Biopsies: pending from 8/25, due 9/7    # Nutrition  Patient has tolerated advancing diet but is taking in less calories than necessary per calorie counting.   - nutrition following, continue calorie counts     # Recent pseudomonas bacteremia  # Necrotic mouth ulcer, resolving  Had profound neutropenia and pseudomonas bacteremia several weeks ago in associated with necrotic mouth ulcer; neutropenia likely immunosuppression-induced. Neutropenia resolved last admission and pseudomonal bacteremia is likely cleared. Necrotic ulcer appears to be resolving from prior admission. Not currently active issues but will continue to monitor. Low threshold to repeat cultures.      # Pulmonary nodules  Stable nodule but also new RLL nodules 5 mm and 3 mm noted incidentally on CT 8/12, will need interval follow up in 4-6 weeks (~9/16).     # ESRD on TTS HD  - Nephrology consulted, appreciate assistance     # HTN  - at goal on hydralazine 75 mg QID    # DM2   - Glargine increased 8/24, medium sliding scale insulin  - Monitor, may need adjustment now that taking PO     FEN: Advance as tolerated  PPx: Holding heparin for now, SCDs ordered  Dispo: PT/OT recommending TCU at DC    ================================================================    Subjective/24-Hr Events:   Last 24 hr care team notes reviewed. Patient had some orthostasis overnight - sBP down to 80s with standing. Per patient, has been going on for last few days. Denies signs of fluid retention. No fever, chills, nausea, vomiting. No blood noted in ostomy.     ROS:  4 point ROS including respiratory, CV, GI and  (other than that noted in the HPI) is  "negative.     Medications: Reviewed in EPIC.     Physical Exam:   BP (!) 160/113  Pulse 101  Temp 97.9  F (36.6  C) (Oral)  Resp 18  Ht 1.753 m (5' 9\")  Wt 81.6 kg (179 lb 14.3 oz)  SpO2 100%  BMI 26.57 kg/m2    GENERAL: Appears comfortable, in no distress, chronically ill.  HEENT: Eye symmetrical, no discharge or icterus bilaterally. Mucous membranes moist and without lesions.  NECK: Supple, JVD not visible at 90 degrees.   CV: Tachycardic but regular, +S1S2, no murmur, rub, or gallop.   RESPIRATORY: Respirations regular, even, and unlabored. Lungs CTA throughout.   GI: Soft and mildly distended with normoactive bowel sounds present in all quadrants. No tenderness, rebound, guarding. Stoma site CDI, output dark green/brown without overt melena.  EXTREMITIES: No peripheral edema. 2+ bilateral pedal pulses. Warm.   NEUROLOGIC: Alert and oriented x 3. No focal deficits.   MUSCULOSKELETAL: No joint swelling or tenderness.   SKIN: No jaundice. No rashes or lesions. Ulcer on hard palate still present with yellow sloughing.     Labs:  CMP    Recent Labs  Lab 08/28/18  0710 08/27/18  0644 08/26/18  0653 08/25/18  0821 08/24/18  0634  08/22/18  0741   * 129* 130* 125* 127*  < > 128*   POTASSIUM 5.2 4.7 4.3 3.9 3.9  < > 3.3*   CHLORIDE 94 94 94 90* 91*  < > 93*   CO2 25 24 26 26 24  < > 24   ANIONGAP 10 11 11 9 11  < > 10   * 185* 165* 217* 202*  < > 256*   BUN 66* 56* 38* 59* 49*  < > 46*   CR 5.82* 4.66* 3.40* 4.30* 3.36*  < > 3.22*   GFRESTIMATED 10* 13* 18* 14* 19*  < > 20*   GFRESTBLACK 12* 15* 22* 17* 23*  < > 24*   TRINI 7.9* 7.9* 8.0* 7.6* 7.9*  < > 7.3*   MAG  --  2.3 1.9 1.7 1.8  < > 1.8   PHOS  --  3.1  --   --  3.6  --  2.7   PROTTOTAL  --  5.2*  --   --   --   --   --    ALBUMIN  --  1.8*  --   --   --   --   --    BILITOTAL  --  0.5  --   --   --   --   --    ALKPHOS  --  133  --   --   --   --   --    AST  --  16  --   --   --   --   --    ALT  --  13  --   --   --   --   --    < > = values " in this interval not displayed.    CBC    Recent Labs  Lab 08/28/18  0710 08/27/18  0644 08/26/18  0653 08/25/18  1702 08/25/18  0821   WBC 5.1 5.2 5.8  --  5.5   RBC 2.24* 2.41* 2.47*  --  2.29*   HGB 7.0* 7.5* 7.7* 8.0* 7.1*   HCT 21.5* 22.8* 23.0*  --  21.0*   MCV 96 95 93  --  92   MCH 31.3 31.1 31.2  --  31.0   MCHC 32.6 32.9 33.5  --  33.8   RDW 19.2* 18.8* 18.7*  --  18.0*   PLT 73* 91* 74*  --  71*       INR    Recent Labs  Lab 08/27/18  0644   INR 1.07       Time/Communication  I personally spent a total of 25 minutes. Of that 15 minutes was counseling/coordination of patient's care. Plan of care discussed with patient. See my note above for details.    Patient discussed with Dr. Ernst.      Ramya Suh, FRANK, NP-C  Advanced Heart Failure/Cardiology II Service  Pager [phone]

## 2018-08-29 ENCOUNTER — RESULTS ONLY (OUTPATIENT)
Dept: OTHER | Facility: CLINIC | Age: 63
End: 2018-08-29

## 2018-08-29 ENCOUNTER — DOCUMENTATION ONLY (OUTPATIENT)
Dept: TRANSPLANT | Facility: CLINIC | Age: 63
End: 2018-08-29

## 2018-08-29 ENCOUNTER — TELEPHONE (OUTPATIENT)
Dept: TRANSPLANT | Facility: CLINIC | Age: 63
End: 2018-08-29

## 2018-08-29 ENCOUNTER — APPOINTMENT (OUTPATIENT)
Dept: CT IMAGING | Facility: CLINIC | Age: 63
DRG: 329 | End: 2018-08-29
Attending: NURSE PRACTITIONER
Payer: MEDICARE

## 2018-08-29 LAB
ANION GAP SERPL CALCULATED.3IONS-SCNC: 10 MMOL/L (ref 3–14)
BASOPHILS # BLD AUTO: 0 10E9/L (ref 0–0.2)
BASOPHILS NFR BLD AUTO: 0 %
BLD PROD TYP BPU: NORMAL
BLD UNIT ID BPU: 0
BLOOD PRODUCT CODE: NORMAL
BPU ID: NORMAL
BUN SERPL-MCNC: 40 MG/DL (ref 7–30)
CALCIUM SERPL-MCNC: 7.6 MG/DL (ref 8.5–10.1)
CHLORIDE SERPL-SCNC: 97 MMOL/L (ref 94–109)
CMV DNA SPEC NAA+PROBE-ACNC: ABNORMAL [IU]/ML
CMV DNA SPEC NAA+PROBE-LOG#: ABNORMAL {LOG_IU}/ML
CO2 SERPL-SCNC: 26 MMOL/L (ref 20–32)
CREAT SERPL-MCNC: 4.09 MG/DL (ref 0.66–1.25)
DIFFERENTIAL METHOD BLD: ABNORMAL
EOSINOPHIL # BLD AUTO: 0 10E9/L (ref 0–0.7)
EOSINOPHIL NFR BLD AUTO: 0.9 %
ERYTHROCYTE [DISTWIDTH] IN BLOOD BY AUTOMATED COUNT: 19.4 % (ref 10–15)
GFR SERPL CREATININE-BSD FRML MDRD: 15 ML/MIN/1.7M2
GLUCOSE BLDC GLUCOMTR-MCNC: 162 MG/DL (ref 70–99)
GLUCOSE BLDC GLUCOMTR-MCNC: 177 MG/DL (ref 70–99)
GLUCOSE BLDC GLUCOMTR-MCNC: 207 MG/DL (ref 70–99)
GLUCOSE SERPL-MCNC: 214 MG/DL (ref 70–99)
HCT VFR BLD AUTO: 19.8 % (ref 40–53)
HGB BLD-MCNC: 6.5 G/DL (ref 13.3–17.7)
HGB BLD-MCNC: 7.2 G/DL (ref 13.3–17.7)
IMM GRANULOCYTES # BLD: 0 10E9/L (ref 0–0.4)
IMM GRANULOCYTES NFR BLD: 0.3 %
LACTATE BLD-SCNC: 1.6 MMOL/L (ref 0.7–2)
LYMPHOCYTES # BLD AUTO: 0.3 10E9/L (ref 0.8–5.3)
LYMPHOCYTES NFR BLD AUTO: 9.8 %
MAGNESIUM SERPL-MCNC: 1.9 MG/DL (ref 1.6–2.3)
MCH RBC QN AUTO: 31.7 PG (ref 26.5–33)
MCHC RBC AUTO-ENTMCNC: 32.8 G/DL (ref 31.5–36.5)
MCV RBC AUTO: 97 FL (ref 78–100)
MONOCYTES # BLD AUTO: 0.4 10E9/L (ref 0–1.3)
MONOCYTES NFR BLD AUTO: 11 %
NEUTROPHILS # BLD AUTO: 2.6 10E9/L (ref 1.6–8.3)
NEUTROPHILS NFR BLD AUTO: 78 %
NRBC # BLD AUTO: 0 10*3/UL
NRBC BLD AUTO-RTO: 0 /100
PHOSPHATE SERPL-MCNC: 3.1 MG/DL (ref 2.5–4.5)
PLATELET # BLD AUTO: 60 10E9/L (ref 150–450)
POTASSIUM SERPL-SCNC: 4.3 MMOL/L (ref 3.4–5.3)
PRA DONOR SPECIFIC ABY: NORMAL
RBC # BLD AUTO: 2.05 10E12/L (ref 4.4–5.9)
SODIUM SERPL-SCNC: 133 MMOL/L (ref 133–144)
SPECIMEN SOURCE: ABNORMAL
TACROLIMUS BLD-MCNC: 7.5 UG/L (ref 5–15)
TME LAST DOSE: NORMAL H
TRANSFUSION STATUS PATIENT QL: NORMAL
TRANSFUSION STATUS PATIENT QL: NORMAL
WBC # BLD AUTO: 3.4 10E9/L (ref 4–11)

## 2018-08-29 PROCEDURE — 74177 CT ABD & PELVIS W/CONTRAST: CPT

## 2018-08-29 PROCEDURE — 00000146 ZZHCL STATISTIC GLUCOSE BY METER IP

## 2018-08-29 PROCEDURE — 90937 HEMODIALYSIS REPEATED EVAL: CPT

## 2018-08-29 PROCEDURE — A9270 NON-COVERED ITEM OR SERVICE: HCPCS | Mod: GY | Performed by: COLON & RECTAL SURGERY

## 2018-08-29 PROCEDURE — 36415 COLL VENOUS BLD VENIPUNCTURE: CPT | Performed by: STUDENT IN AN ORGANIZED HEALTH CARE EDUCATION/TRAINING PROGRAM

## 2018-08-29 PROCEDURE — A9270 NON-COVERED ITEM OR SERVICE: HCPCS | Mod: GY | Performed by: INTERNAL MEDICINE

## 2018-08-29 PROCEDURE — 86850 RBC ANTIBODY SCREEN: CPT | Performed by: INTERNAL MEDICINE

## 2018-08-29 PROCEDURE — 36415 COLL VENOUS BLD VENIPUNCTURE: CPT | Performed by: INTERNAL MEDICINE

## 2018-08-29 PROCEDURE — 86923 COMPATIBILITY TEST ELECTRIC: CPT | Performed by: INTERNAL MEDICINE

## 2018-08-29 PROCEDURE — 80048 BASIC METABOLIC PNL TOTAL CA: CPT | Performed by: NURSE PRACTITIONER

## 2018-08-29 PROCEDURE — 83735 ASSAY OF MAGNESIUM: CPT | Performed by: NURSE PRACTITIONER

## 2018-08-29 PROCEDURE — A9270 NON-COVERED ITEM OR SERVICE: HCPCS | Mod: GY | Performed by: NURSE PRACTITIONER

## 2018-08-29 PROCEDURE — 25000132 ZZH RX MED GY IP 250 OP 250 PS 637: Mod: GY | Performed by: INTERNAL MEDICINE

## 2018-08-29 PROCEDURE — 86901 BLOOD TYPING SEROLOGIC RH(D): CPT | Performed by: INTERNAL MEDICINE

## 2018-08-29 PROCEDURE — A9270 NON-COVERED ITEM OR SERVICE: HCPCS | Mod: GY | Performed by: STUDENT IN AN ORGANIZED HEALTH CARE EDUCATION/TRAINING PROGRAM

## 2018-08-29 PROCEDURE — 85025 COMPLETE CBC W/AUTO DIFF WBC: CPT | Performed by: NURSE PRACTITIONER

## 2018-08-29 PROCEDURE — 25000132 ZZH RX MED GY IP 250 OP 250 PS 637: Mod: GY | Performed by: NURSE PRACTITIONER

## 2018-08-29 PROCEDURE — 21400003 ZZH R&B CCU CRITICAL UMMC

## 2018-08-29 PROCEDURE — 25000132 ZZH RX MED GY IP 250 OP 250 PS 637: Mod: GY | Performed by: COLON & RECTAL SURGERY

## 2018-08-29 PROCEDURE — 80197 ASSAY OF TACROLIMUS: CPT | Performed by: STUDENT IN AN ORGANIZED HEALTH CARE EDUCATION/TRAINING PROGRAM

## 2018-08-29 PROCEDURE — 83605 ASSAY OF LACTIC ACID: CPT | Performed by: INTERNAL MEDICINE

## 2018-08-29 PROCEDURE — 25000128 H RX IP 250 OP 636: Performed by: INTERNAL MEDICINE

## 2018-08-29 PROCEDURE — 99232 SBSQ HOSP IP/OBS MODERATE 35: CPT | Performed by: NURSE PRACTITIONER

## 2018-08-29 PROCEDURE — 85018 HEMOGLOBIN: CPT | Performed by: INTERNAL MEDICINE

## 2018-08-29 PROCEDURE — 86833 HLA CLASS II HIGH DEFIN QUAL: CPT | Performed by: TRANSPLANT SURGERY

## 2018-08-29 PROCEDURE — 25000132 ZZH RX MED GY IP 250 OP 250 PS 637: Mod: GY | Performed by: STUDENT IN AN ORGANIZED HEALTH CARE EDUCATION/TRAINING PROGRAM

## 2018-08-29 PROCEDURE — 25000131 ZZH RX MED GY IP 250 OP 636 PS 637: Mod: GY | Performed by: INTERNAL MEDICINE

## 2018-08-29 PROCEDURE — 86900 BLOOD TYPING SEROLOGIC ABO: CPT | Performed by: INTERNAL MEDICINE

## 2018-08-29 PROCEDURE — P9016 RBC LEUKOCYTES REDUCED: HCPCS | Performed by: INTERNAL MEDICINE

## 2018-08-29 PROCEDURE — 84100 ASSAY OF PHOSPHORUS: CPT | Performed by: NURSE PRACTITIONER

## 2018-08-29 PROCEDURE — 86832 HLA CLASS I HIGH DEFIN QUAL: CPT | Performed by: TRANSPLANT SURGERY

## 2018-08-29 PROCEDURE — 25000125 ZZHC RX 250: Performed by: NURSE PRACTITIONER

## 2018-08-29 RX ORDER — SACCHAROMYCES BOULARDII 250 MG
250 CAPSULE ORAL 2 TIMES DAILY
Status: DISCONTINUED | OUTPATIENT
Start: 2018-08-29 | End: 2018-08-30

## 2018-08-29 RX ORDER — BETA-CAROTENE 7500 MCG
25000 CAPSULE ORAL 2 TIMES DAILY
Status: DISCONTINUED | OUTPATIENT
Start: 2018-08-29 | End: 2018-08-30

## 2018-08-29 RX ORDER — IOPAMIDOL 755 MG/ML
107 INJECTION, SOLUTION INTRAVASCULAR ONCE
Status: COMPLETED | OUTPATIENT
Start: 2018-08-29 | End: 2018-08-29

## 2018-08-29 RX ADMIN — VANCOMYCIN HYDROCHLORIDE 125 MG: KIT at 21:03

## 2018-08-29 RX ADMIN — MYCOPHENOLATE MOFETIL 500 MG: 250 CAPSULE ORAL at 21:03

## 2018-08-29 RX ADMIN — INSULIN ASPART 1 UNITS: 100 INJECTION, SOLUTION INTRAVENOUS; SUBCUTANEOUS at 21:01

## 2018-08-29 RX ADMIN — PANTOPRAZOLE SODIUM 40 MG: 40 TABLET, DELAYED RELEASE ORAL at 09:47

## 2018-08-29 RX ADMIN — Medication: at 07:36

## 2018-08-29 RX ADMIN — HYDRALAZINE HYDROCHLORIDE 75 MG: 50 TABLET ORAL at 21:06

## 2018-08-29 RX ADMIN — NYSTATIN 1000000 UNITS: 100000 SUSPENSION ORAL at 09:48

## 2018-08-29 RX ADMIN — TACROLIMUS 3 MG: 1 CAPSULE ORAL at 18:26

## 2018-08-29 RX ADMIN — VANCOMYCIN HYDROCHLORIDE 125 MG: KIT at 09:46

## 2018-08-29 RX ADMIN — HYDRALAZINE HYDROCHLORIDE 75 MG: 50 TABLET ORAL at 06:28

## 2018-08-29 RX ADMIN — Medication 1 CAPSULE: at 09:46

## 2018-08-29 RX ADMIN — TACROLIMUS 3 MG: 1 CAPSULE ORAL at 09:46

## 2018-08-29 RX ADMIN — Medication 25000 UNITS: at 23:02

## 2018-08-29 RX ADMIN — ATORVASTATIN CALCIUM 20 MG: 20 TABLET, FILM COATED ORAL at 21:02

## 2018-08-29 RX ADMIN — INSULIN ASPART 2 UNITS: 100 INJECTION, SOLUTION INTRAVENOUS; SUBCUTANEOUS at 09:57

## 2018-08-29 RX ADMIN — NYSTATIN 1000000 UNITS: 100000 SUSPENSION ORAL at 21:03

## 2018-08-29 RX ADMIN — NYSTATIN 1000000 UNITS: 100000 SUSPENSION ORAL at 17:08

## 2018-08-29 RX ADMIN — Medication 50 MG: at 22:00

## 2018-08-29 RX ADMIN — SODIUM CHLORIDE 300 ML: 9 INJECTION, SOLUTION INTRAVENOUS at 07:35

## 2018-08-29 RX ADMIN — VANCOMYCIN HYDROCHLORIDE 125 MG: KIT at 12:19

## 2018-08-29 RX ADMIN — IOPAMIDOL 107 ML: 755 INJECTION, SOLUTION INTRAVENOUS at 19:50

## 2018-08-29 RX ADMIN — PREDNISONE 5 MG: 5 TABLET ORAL at 21:06

## 2018-08-29 RX ADMIN — SODIUM CHLORIDE 250 ML: 9 INJECTION, SOLUTION INTRAVENOUS at 07:35

## 2018-08-29 RX ADMIN — PREDNISONE 10 MG: 10 TABLET ORAL at 09:48

## 2018-08-29 RX ADMIN — MYCOPHENOLATE MOFETIL 500 MG: 250 CAPSULE ORAL at 09:47

## 2018-08-29 RX ADMIN — INSULIN GLARGINE 20 UNITS: 100 INJECTION, SOLUTION SUBCUTANEOUS at 09:58

## 2018-08-29 RX ADMIN — INSULIN ASPART 1 UNITS: 100 INJECTION, SOLUTION INTRAVENOUS; SUBCUTANEOUS at 12:22

## 2018-08-29 RX ADMIN — HYDRALAZINE HYDROCHLORIDE 75 MG: 50 TABLET ORAL at 14:08

## 2018-08-29 RX ADMIN — Medication 250 MG: at 23:03

## 2018-08-29 RX ADMIN — VANCOMYCIN HYDROCHLORIDE 125 MG: KIT at 17:08

## 2018-08-29 ASSESSMENT — ACTIVITIES OF DAILY LIVING (ADL)
ADLS_ACUITY_SCORE: 12

## 2018-08-29 NOTE — PLAN OF CARE
Problem: Patient Care Overview  Goal: Plan of Care/Patient Progress Review  OT/6C: Cancel. Pt at dialysis this AM, check in with pt this PM and pt sound asleep. Will reschedule to tomorrow.

## 2018-08-29 NOTE — PLAN OF CARE
Problem: Patient Care Overview  Goal: Plan of Care/Patient Progress Review  Outcome: Improving    D: Pt admitted 8/12 with abdominal pain and GIB. PMH: NICM s/p heart txp, ESRD on HD, R internal jugular thrombus, DM II.     I/A: No acute events overnight. A&Ox4. Vital signs stable on RA. BP soft, hydralazine held. Monitor shows sinus rhythm/sinus tachycardia, HR 90's-100's. Dull abdominal pain managed with PRN Tramadol. Oliguric. Moderate amount of dark brown stool in colostomy. Continues on low fiber diet. Blood sugars managed with sliding scale insulin. Refused 0200 blood sugar check. Trending Hgb. Up with assist of 1. Pt requesting to not be disturbed [phone].     P: Continue to monitor. Notify Cards 2 with changes/concerns.

## 2018-08-29 NOTE — PLAN OF CARE
Problem: Patient Care Overview  Goal: Plan of Care/Patient Progress Review  Cancel PT this day; pt at dialysis in AM, asleep upon PM check - in.

## 2018-08-29 NOTE — PROGRESS NOTES
Colorectal Surgery Progress Note  POD#17    Subjective:  Dizziness improving slightly. Increased participation in therapies. Some hypotension after dialysis yesterday- 3L removed.     Vitals:  Vitals:    08/28/18 2019 08/28/18 2226 08/29/18 0400 08/29/18 0627   BP: 113/75 94/65  (!) 147/105   BP Location: Left arm Left arm  Left arm   Pulse: 106      Resp: 18 18 16    Temp: 99.3  F (37.4  C) 99.2  F (37.3  C)     TempSrc: Oral Oral     SpO2: 100% 100%     Weight:    79.7 kg (175 lb 11.3 oz)   Height:           I/O:  I/O last 3 completed shifts:  In: 1600 [P.O.:600; I.V.:1000]  Out: 3850 [Other:3000; Stool:850]    Physical Exam:  Gen: AAOx3, NAD, lying in bed, conversant  Pulm: Non-labored breathing  Abd: Soft, non-distended, non-tender   Incision C/D/I with dermabond    Stoma pink and viable with dark semi-solid stool and gas in bag  Ext:  Warm and well-perfused    BMP    Recent Labs  Lab 08/28/18  0710 08/27/18  0644 08/26/18  0653 08/25/18  0821 08/24/18  0634  08/22/18  0741   * 129* 130* 125* 127*  < > 128*   POTASSIUM 5.2 4.7 4.3 3.9 3.9  < > 3.3*   CHLORIDE 94 94 94 90* 91*  < > 93*   CO2 25 24 26 26 24  < > 24   BUN 66* 56* 38* 59* 49*  < > 46*   CR 5.82* 4.66* 3.40* 4.30* 3.36*  < > 3.22*   * 185* 165* 217* 202*  < > 256*   MAG  --  2.3 1.9 1.7 1.8  < > 1.8   PHOS  --  3.1  --   --  3.6  --  2.7   < > = values in this interval not displayed.  CBC    Recent Labs  Lab 08/29/18  0622 08/28/18  0710 08/27/18  0644 08/26/18  0653   WBC 3.4* 5.1 5.2 5.8   HGB 6.5* 7.0* 7.5* 7.7*   HCT 19.8* 21.5* 22.8* 23.0*   PLT 60* 73* 91* 74*         ASSESSMENT: This is a 63 year old male with NICM s/p heart transplant 6/2018 on immunosuppressants, with ESRD on HD, R internal jugular thrombus previously on apixaban, T2DM, hx pseudomonal bacteremia admitted to cards service for painless BRBPR. Found to have diverticulitis with 7.8 cm pericolonic abscess without safe window for IR drain. 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 Post-op course complicated by ileus, and concern for bleeding after initiation of anticoagulation, with slowly down-trending Hgb. Ileus now resolved with return of bowel function. Appreciate care per primary team.     - Recommend reviewing medications with primary team to see if can improve dizziness and encouraged oral intake  - Continue low residue diet and encourage supplements as tolerated  - Schedule Miralax daily  - Follow-up AM hgb. Plan to hold anticoagulation and outpatient endoscopy if no further transfusion requirements. If requires blood transfusion will consider inpatient EGD and colonoscopy to investigate possible source.     Please call with further questions or concerns. Colorectal Surgery will continue to follow.     Julita Robledo MD   Colon and Rectal Surgery Fellow  Pager: [phone]  8/29/2018 at 7:09 AM

## 2018-08-29 NOTE — PROGRESS NOTES
Nephrology Progress Note  08/29/2018       Murray Nicholson is a 63 year old male with Heart transplant 6/18, ESRD on HD, HTN, Right internal jugular thrombus on Aphixaban, recent admission 7/26-8/6/18  for pseudomonas bacteremia felt to be 2/2 probable prececal colitis and necrotic mouth ulcer, admitted 8/12/18 with abdominal pain, bloody stool and fever, found to have C diff infection, anorectal abscess and pericolonic abscess.        ASSESSMENT AND RECOMMENDATIONS:       ESRD - Etiology of ESRD 2/2 HTN, DM, CRS.   Has chronic OP HD at [facility], TTS schedule, under care of Dr Mcpherson. Right TDC, EDW increasing to 79.5 kg   - Will continue TTS schedule   - No heparin       Volume status: EDW 79.5 kg. Appears mildly hypervolemic with 1+ upper and lower extremity edema and elevated BP's, however RHC on 8/24 with low filling pressures (RA 1). Anuric. Has colostomy.   - Continue pre run weights, standing   - Strict I/O  -  Will need to increase EDW based on RHC with low filling pressures (increase to 79.5 kg)  - No UF today, giving blood on HD    Hypervolemic hyponatremia: ESRD and inability to excrete free water        BP: BP's quite elevated today 160's  - Cardiology started hydralazine 75 mg tid, will need increased BP meds to accommodate higher EDW based on low filling pressures on 8/24 RHC         Transplant IS regimen: Tac, MMF, Pred      BMD: Ca 7.6, alb 1.8, phos 3.1    GIB: getting PRBC today, may have scope    Anemia, acute on chronic: hgb 6.5 with GIB    - will increase epogen to 10,000 units  - Transfusions per primary team (getting 1 unit PRBC today)         Pericolonic abscess, small perircal abscess 2.7 cm.- Underwent I/D anorectal abscess on 8/13 and  Sigmoidectomy/end colostomy on 8/14. Also has C diff, on PO vanc. Antimicrobials per primary. CT suggestive of an ileus and NG tube was placed, now resolved and NG tube out      Lung nodules - New Right lower lobe pulmonary nodules seen on CT this  "admission. ID recommending close f/u.         Recommendations were communicated to primary team via progress note and phone conversation.    Kristi Armas PA-C  Division of Kidney Disease  Pager [phone]          Interval History :   Seen on dialysis, getting 1 units PRBC, may have scope tomorrow if hgb continues to drift. New increased EDW based on RHC on 8/24. Started on hydralazine per cards. Hungry today, feeling a bit better. Denies CP, SOB, dizziness, chills      Review of Systems:   4 point ROS negative other than as noted above.    Physical Exam:   I/O last 3 completed shifts:  In: 1600 [P.O.:600; I.V.:1000]  Out: 3850 [Other:3000; Stool:850]   BP (!) 172/108  Pulse 106  Temp 98.5  F (36.9  C) (Oral)  Resp 16  Ht 1.753 m (5' 9\")  Wt 79.7 kg (175 lb 11.3 oz)  SpO2 100%  BMI 25.95 kg/m2     GENERAL APPEARANCE: alert, NAD  EYES: no scleral icterus, pupils equal  Pulmonary: lungs CTA  CV: RRR   - Edema 1+ upper and lower extremity edema  GI: soft, colostomy/stoma   MS: no evidence of inflammation in joints, no muscle tenderness  SKIN: no rash, warm, dry, no cyanosis  NEURO: alert/oriented  ACCESS: Right TDC    Labs:   All labs reviewed by me  Electrolytes/Renal -   Recent Labs   Lab Test  08/29/18   0622  08/28/18   0710  08/27/18   0644  08/26/18   0653   08/24/18   0634   NA  133  129*  129*  130*   < >  127*   POTASSIUM  4.3  5.2  4.7  4.3   < >  3.9   CHLORIDE  97  94  94  94   < >  91*   CO2  26  25  24  26   < >  24   BUN  40*  66*  56*  38*   < >  49*   CR  4.09*  5.82*  4.66*  3.40*   < >  3.36*   GLC  214*  142*  185*  165*   < >  202*   TRINI  7.6*  7.9*  7.9*  8.0*   < >  7.9*   MAG  1.9   --   2.3  1.9   < >  1.8   PHOS  3.1   --   3.1   --    --   3.6    < > = values in this interval not displayed.       CBC -   Recent Labs   Lab Test  08/29/18   0622  08/28/18   0710  08/27/18   0644   WBC  3.4*  5.1  5.2   HGB  6.5*  7.0*  7.5*   PLT  60*  73*  91*       LFTs -   Recent Labs   Lab Test  " 08/27/18   0644  08/20/18   1105  08/14/18   0620   ALKPHOS  133  170*  110   BILITOTAL  0.5  0.4  0.3   ALT  13  18  17   AST  16  41  29   PROTTOTAL  5.2*  6.1*  5.3*   ALBUMIN  1.8*  1.7*  1.6*       Iron Panel -   Recent Labs   Lab Test  07/10/18   0711  05/08/18   0954  07/19/17   1306   IRON  66  58  46   IRONSAT  28  27  18   ALBERTINA  771*  621*  369       Current Medications:    atorvastatin  20 mg Oral QPM     fluticasone  1-2 spray Both Nostrils Daily     gelatin absorbable  1 each Topical During Hemodialysis (from stock)     hydrALAZINE  75 mg Oral Q8H JEAN     insulin aspart  1-6 Units Subcutaneous TID w/meals     insulin aspart  1-5 Units Subcutaneous At Bedtime     insulin glargine  20 Units Subcutaneous QAM AC     mycophenolate  500 mg Oral BID     NEPHROCAPS  1 capsule Oral Daily     nystatin  1,000,000 Units Swish & Swallow 4x Daily     pantoprazole  40 mg Oral QAM AC     predniSONE  10 mg Oral Daily     predniSONE  5 mg Oral Daily     sodium chloride (PF)  3 mL Intracatheter During Hemodialysis (from stock)     sodium chloride (PF)  3 mL Intracatheter During Hemodialysis (from stock)     sodium chloride (PF)  3 mL Intracatheter Q8H     sodium chloride (PF)  3 mL Intracatheter Q8H     tacrolimus  3 mg Oral BID IS     triamcinolone   Topical BID     valGANciclovir  450 mg Oral Once per day on Tue Sat     vancomycin  125 mg Oral 4x Daily       IV fluid REPLACEMENT ONLY       IV fluid REPLACEMENT ONLY       Kristi Armas PA-C

## 2018-08-29 NOTE — TELEPHONE ENCOUNTER
Coordinator called pt to let him know his status was changed to inactive on kidney wait list d/t having current infections.  Explained he will not be eligible for kidney offers while inactive, but will still accumulate wait time. Requested pt call back once his infections are cleared so he can be reviewed for active status. Pt verbalized understanding and had no further questions. Contact information provided.    UNOS updated, immunology & PFR notified, change in status letter routed to admin team to send out.

## 2018-08-29 NOTE — PROGRESS NOTES
HEMODIALYSIS TREATMENT NOTE    Date: 8/29/2018  Time: 10:41 AM    Data:  Pre Wt: 79.7 kg (175 lb 11.3 oz)   Desired Wt: 77.7 kg   Post Wt: 79.7 kg (175 lb 11.3 oz)  Weight gain: 0 kg   Weight change: 0 kg  Ultrafiltration - Post Run Net Total Removed (mL): 300 mL  Ultrafiltration - Post Run Net Total Gain (mL): 0 mL  Vascular Access Status: Patent  Dialyzer Rinse: Clear  Total Blood Volume Processed: 0 (UF run only)  Total Dialysis (Treatment) Time:  2  Hours     Lab:   No    Interventions/Assessment:  2hr UF run only per order,   1PRBCs adm during HD for low Hgb of 6.5, No reactions noted,   VSS during HD, Pt denied HD r/t discomfort,   Hand off report given to bedside RN.      Plan:    Next HD per renal team.

## 2018-08-29 NOTE — PROGRESS NOTES
[facility]   Cardiology II Service / Advanced Heart Failure  Daily Progress Note      Patient: Murray Nicholson  MRN: [id number]  Admission Date: 8/12/2018  Hospital Day # 17    Assessment and Plan: Murray Nicholson is a 63 year old male with a h/o NICM s/p heart txp (6/14/18), ESRD on HD, R internal jugular thrombus on apixaban and DM2 p/w 3-4 bright red BM and fevers/chills, found to have a daniella-colonic abscess (7.8 cm), daniella-rectal abscess (2.7 cm), probable diverticulitis, and R colon colitis.    Today's Plan:  -1 unit pRBC today for hg 6.5  -aim for net even to slightly positive I/O today  -CT enterography today to reassess 1.5 centimeter enhancement in LLQ     # Daniella-colonic abscess (7.8 cm) s/p laparoscopic assisted sigmoid colectomy with end colostomy 8/14) positive for VRE  # Daniella-rectal abscess (2.7 cm) s/p drainage 8/12  # R colon colitis  # Positive c. Diff  - s/p two week course cipro, linezolid, flagyl 8/14-8/27  - Continue PO vanc x 10 days after abx d/c'ed (through 9/6)  - Miralax daily per GI  - Acetaminophen, tramadol PRN pain  - Colorectal, transplant ID following, appreciate assistance  - Low residual diet with supplements  - CT enterography given low grade temp overnight and WBC 3.4     # Recent right internal jugular line-associated thrombus  # Acute anemia  # Melena  Noted to have melena in ostomy, holding heparin gtt since 8/22. Transfuse for Hgb <7.  - 1 unit pRBC today for hgb 6.5, likely partially dilutional after IVF yesterday  - Continue to hold heparin gtt  - Per CRS, no scope due to risk of disruption anastomosis, prefer to defer today despite drop in hgb due to leukopenia    # Status post OHT on 6/13/18, history of ICM  # Donor three vessel CAD  Immunosuppression:   --continue prednisone 10 mg in AM and 5 mg in PM  --continue Cellcept 500 mg BID  --continue tacrolimus 3 mg bid (goal 6-8 due to infection), daily troughs - pending today  --repeat DSAs pending    Prophylaxis:    --CAV: aspirin, atorvastatin 20 mg daily (cost concern with rosuva)  --Thrush: Nystatin swish and swallow  --PCP: s/p pentamidine 8/17, due 9/14  --GI: Protonix   --CMV: valcyte 450 mg Tues and Sat, renally dosed, ideally 6 mos post-Tx  --Bones: calcium/vitamin D      Serostatus: HSV1-, HSV1+, CMV D+/R-, EBV D?/R+, VZV+    Biopsies: negative 8/24, due 9/7    # Malnutrition  - nutrition following, continue calorie counts     # Recent pseudomonas bacteremia  # Necrotic mouth ulcer, resolving  Had profound neutropenia and pseudomonas bacteremia several weeks ago in associated with necrotic mouth ulcer; neutropenia likely immunosuppression-induced. Neutropenia resolved last admission and pseudomonal bacteremia is likely cleared. Necrotic ulcer appears to be resolving from prior admission. Not currently active issues but will continue to monitor. Low threshold to repeat cultures.      # Pulmonary nodules  Stable nodule but also new RLL nodules 5 mm and 3 mm noted incidentally on CT 8/12, will need interval follow up in 4-6 weeks (~9/16).     # ESRD on TTS HD  - Nephrology consulted, appreciate assistance     # HTN  - at goal on hydralazine 75 mg tid (decreased from qid on 8/27)    # DM2   - Glargine increased 8/24, medium sliding scale insulin  - Monitor, may need adjustment now that taking PO     FEN: Advance as tolerated, low residue  PPx: Holding heparin for now, SCDs ordered  Dispo: PT/OT recommending TCU at DC    ================================================================    Subjective/24-Hr Events:   Last 24 hr care team notes reviewed. Orthostasis and dizziness improved. Tolerated IVF without signs of fluid retention. Low grade temp overnight but denies feeling feverish or chilled. No nausea, vomiting. No blood noted in ostomy.     ROS:  4 point ROS including respiratory, CV, GI and  (other than that noted in the HPI) is negative.     Medications: Reviewed in EPIC.     Physical Exam:   BP (!) 157/108   "Pulse 106  Temp 98.5  F (36.9  C) (Oral)  Resp 16  Ht 1.753 m (5' 9\")  Wt 79.7 kg (175 lb 11.3 oz)  SpO2 100%  BMI 25.95 kg/m2    GENERAL: Appears comfortable, in no distress, chronically ill.  HEENT: Eye symmetrical, no discharge or icterus bilaterally. Mucous membranes moist and without lesions.  NECK: Supple, JVD not visible at 90 degrees.   CV: Tachycardic but regular, +S1S2, no murmur, rub, or gallop.   RESPIRATORY: Respirations regular, even, and unlabored. Lungs CTA throughout.   GI: Soft and mildly distended with normoactive bowel sounds present in all quadrants. No tenderness, rebound, guarding. Stoma site CDI, output dark green/brown without overt melena.  EXTREMITIES: No peripheral edema. 2+ bilateral pedal pulses. Warm.   NEUROLOGIC: Alert and oriented x 3. No focal deficits.   MUSCULOSKELETAL: No joint swelling or tenderness.   SKIN: No jaundice. No rashes or lesions. Ulcer on hard palate still present with yellow sloughing.     Labs:  CMP    Recent Labs  Lab 08/29/18  0622 08/28/18  0710 08/27/18  0644 08/26/18  0653 08/25/18  0821 08/24/18  0634    129* 129* 130* 125* 127*   POTASSIUM 4.3 5.2 4.7 4.3 3.9 3.9   CHLORIDE 97 94 94 94 90* 91*   CO2 26 25 24 26 26 24   ANIONGAP 10 10 11 11 9 11   * 142* 185* 165* 217* 202*   BUN 40* 66* 56* 38* 59* 49*   CR 4.09* 5.82* 4.66* 3.40* 4.30* 3.36*   GFRESTIMATED 15* 10* 13* 18* 14* 19*   GFRESTBLACK 18* 12* 15* 22* 17* 23*   TRINI 7.6* 7.9* 7.9* 8.0* 7.6* 7.9*   MAG 1.9  --  2.3 1.9 1.7 1.8   PHOS 3.1  --  3.1  --   --  3.6   PROTTOTAL  --   --  5.2*  --   --   --    ALBUMIN  --   --  1.8*  --   --   --    BILITOTAL  --   --  0.5  --   --   --    ALKPHOS  --   --  133  --   --   --    AST  --   --  16  --   --   --    ALT  --   --  13  --   --   --        CBC    Recent Labs  Lab 08/29/18  0622 08/28/18  0710 08/27/18  0644 08/26/18  0653   WBC 3.4* 5.1 5.2 5.8   RBC 2.05* 2.24* 2.41* 2.47*   HGB 6.5* 7.0* 7.5* 7.7*   HCT 19.8* 21.5* 22.8* 23.0* "   MCV 97 96 95 93   MCH 31.7 31.3 31.1 31.2   MCHC 32.8 32.6 32.9 33.5   RDW 19.4* 19.2* 18.8* 18.7*   PLT 60* 73* 91* 74*       INR    Recent Labs  Lab 08/27/18  0644   INR 1.07       Time/Communication  I personally spent a total of 25 minutes. Of that 15 minutes was counseling/coordination of patient's care. Plan of care discussed with patient. See my note above for details.    Patient discussed with Dr. Ernst.      Ramya Suh, FRANK, NP-C  Advanced Heart Failure/Cardiology II Service  Pager [phone]

## 2018-08-29 NOTE — PLAN OF CARE
Problem: Surgery Nonspecified (Adult)  Goal: Signs and Symptoms of Listed Potential Problems Will be Absent, Minimized or Managed (Surgery Nonspecified)  Signs and symptoms of listed potential problems will be absent, minimized or managed by discharge/transition of care (reference Surgery Nonspecified (Adult) CPG).   Outcome: No Change  Pt VSS; SR with HR in the 90s; RA with sats in the upper 90s. Pt refusing interventions for abd discomfort. Short HD run this AM; 300mL removed. Critical Hgb of 6.5; 1 unit PRBC transfused in dialysis without adverse rxn. Sepsis triggered, lactic acid 1.6. Up with A1; oliguric. Pt encouraged to change ostomy on his own; gas +. BS covered with sliding scale insulin per parameters. Plan for enterography CT scheduled for 1930; pt to drink contrast dye 1 hr prior to procedure. NPO. Continue to monitor and notify team with changes.

## 2018-08-30 LAB
ANION GAP SERPL CALCULATED.3IONS-SCNC: 10 MMOL/L (ref 3–14)
BASOPHILS # BLD AUTO: 0 10E9/L (ref 0–0.2)
BASOPHILS NFR BLD AUTO: 0 %
BUN SERPL-MCNC: 47 MG/DL (ref 7–30)
CALCIUM SERPL-MCNC: 7.6 MG/DL (ref 8.5–10.1)
CHLORIDE SERPL-SCNC: 97 MMOL/L (ref 94–109)
CO2 SERPL-SCNC: 24 MMOL/L (ref 20–32)
CREAT SERPL-MCNC: 5.29 MG/DL (ref 0.66–1.25)
DIFFERENTIAL METHOD BLD: ABNORMAL
DONOR IDENTIFICATION: NORMAL
DSA COMMENTS: NORMAL
DSA PRESENT: NO
DSA TEST METHOD: NORMAL
EOSINOPHIL # BLD AUTO: 0.1 10E9/L (ref 0–0.7)
EOSINOPHIL NFR BLD AUTO: 1.6 %
ERYTHROCYTE [DISTWIDTH] IN BLOOD BY AUTOMATED COUNT: 19.9 % (ref 10–15)
GFR SERPL CREATININE-BSD FRML MDRD: 11 ML/MIN/1.7M2
GLUCOSE BLDC GLUCOMTR-MCNC: 155 MG/DL (ref 70–99)
GLUCOSE BLDC GLUCOMTR-MCNC: 169 MG/DL (ref 70–99)
GLUCOSE BLDC GLUCOMTR-MCNC: 184 MG/DL (ref 70–99)
GLUCOSE SERPL-MCNC: 111 MG/DL (ref 70–99)
HCT VFR BLD AUTO: 22.3 % (ref 40–53)
HGB BLD-MCNC: 7.3 G/DL (ref 13.3–17.7)
IMM GRANULOCYTES # BLD: 0 10E9/L (ref 0–0.4)
IMM GRANULOCYTES NFR BLD: 0.3 %
LACTATE BLD-SCNC: 1.3 MMOL/L (ref 0.7–2)
LYMPHOCYTES # BLD AUTO: 0.4 10E9/L (ref 0.8–5.3)
LYMPHOCYTES NFR BLD AUTO: 11 %
MAGNESIUM SERPL-MCNC: 1.8 MG/DL (ref 1.6–2.3)
MCH RBC QN AUTO: 31.5 PG (ref 26.5–33)
MCHC RBC AUTO-ENTMCNC: 32.7 G/DL (ref 31.5–36.5)
MCV RBC AUTO: 96 FL (ref 78–100)
MONOCYTES # BLD AUTO: 0.5 10E9/L (ref 0–1.3)
MONOCYTES NFR BLD AUTO: 12.1 %
NEUTROPHILS # BLD AUTO: 2.9 10E9/L (ref 1.6–8.3)
NEUTROPHILS NFR BLD AUTO: 75 %
NRBC # BLD AUTO: 0 10*3/UL
NRBC BLD AUTO-RTO: 0 /100
ORGAN: NORMAL
PLATELET # BLD AUTO: 51 10E9/L (ref 150–450)
POTASSIUM SERPL-SCNC: 3.9 MMOL/L (ref 3.4–5.3)
RBC # BLD AUTO: 2.32 10E12/L (ref 4.4–5.9)
SA1 CELL: NORMAL
SA1 COMMENTS: NORMAL
SA1 HI RISK ABY: NORMAL
SA1 MOD RISK ABY: NORMAL
SA1 TEST METHOD: NORMAL
SA2 CELL: NORMAL
SA2 COMMENTS: NORMAL
SA2 HI RISK ABY UA: NORMAL
SA2 MOD RISK ABY: NORMAL
SA2 TEST METHOD: NORMAL
SODIUM SERPL-SCNC: 131 MMOL/L (ref 133–144)
WBC # BLD AUTO: 3.8 10E9/L (ref 4–11)

## 2018-08-30 PROCEDURE — 25000132 ZZH RX MED GY IP 250 OP 250 PS 637: Mod: GY | Performed by: NURSE PRACTITIONER

## 2018-08-30 PROCEDURE — 21400006 ZZH R&B CCU INTERMEDIATE UMMC

## 2018-08-30 PROCEDURE — 25000128 H RX IP 250 OP 636: Performed by: INTERNAL MEDICINE

## 2018-08-30 PROCEDURE — 00000146 ZZHCL STATISTIC GLUCOSE BY METER IP

## 2018-08-30 PROCEDURE — 25000125 ZZHC RX 250: Performed by: NURSE PRACTITIONER

## 2018-08-30 PROCEDURE — A9270 NON-COVERED ITEM OR SERVICE: HCPCS | Mod: GY | Performed by: NURSE PRACTITIONER

## 2018-08-30 PROCEDURE — 25000132 ZZH RX MED GY IP 250 OP 250 PS 637: Mod: GY | Performed by: STUDENT IN AN ORGANIZED HEALTH CARE EDUCATION/TRAINING PROGRAM

## 2018-08-30 PROCEDURE — 36415 COLL VENOUS BLD VENIPUNCTURE: CPT | Performed by: INTERNAL MEDICINE

## 2018-08-30 PROCEDURE — 83735 ASSAY OF MAGNESIUM: CPT | Performed by: NURSE PRACTITIONER

## 2018-08-30 PROCEDURE — 87799 DETECT AGENT NOS DNA QUANT: CPT | Performed by: NURSE PRACTITIONER

## 2018-08-30 PROCEDURE — A9270 NON-COVERED ITEM OR SERVICE: HCPCS | Mod: GY | Performed by: INTERNAL MEDICINE

## 2018-08-30 PROCEDURE — 80048 BASIC METABOLIC PNL TOTAL CA: CPT | Performed by: NURSE PRACTITIONER

## 2018-08-30 PROCEDURE — 90937 HEMODIALYSIS REPEATED EVAL: CPT

## 2018-08-30 PROCEDURE — 25000131 ZZH RX MED GY IP 250 OP 636 PS 637: Mod: GY | Performed by: INTERNAL MEDICINE

## 2018-08-30 PROCEDURE — 83605 ASSAY OF LACTIC ACID: CPT | Performed by: INTERNAL MEDICINE

## 2018-08-30 PROCEDURE — 25000132 ZZH RX MED GY IP 250 OP 250 PS 637: Mod: GY | Performed by: COLON & RECTAL SURGERY

## 2018-08-30 PROCEDURE — 99232 SBSQ HOSP IP/OBS MODERATE 35: CPT | Performed by: NURSE PRACTITIONER

## 2018-08-30 PROCEDURE — 40000903 ZZH STATISTIC WOC PT EDUCATION, 31-45 MIN

## 2018-08-30 PROCEDURE — 63400005 ZZH RX 634: Performed by: INTERNAL MEDICINE

## 2018-08-30 PROCEDURE — 85025 COMPLETE CBC W/AUTO DIFF WBC: CPT | Performed by: NURSE PRACTITIONER

## 2018-08-30 PROCEDURE — A9270 NON-COVERED ITEM OR SERVICE: HCPCS | Mod: GY | Performed by: STUDENT IN AN ORGANIZED HEALTH CARE EDUCATION/TRAINING PROGRAM

## 2018-08-30 PROCEDURE — 25000132 ZZH RX MED GY IP 250 OP 250 PS 637: Mod: GY | Performed by: INTERNAL MEDICINE

## 2018-08-30 PROCEDURE — 36415 COLL VENOUS BLD VENIPUNCTURE: CPT | Performed by: NURSE PRACTITIONER

## 2018-08-30 RX ADMIN — MYCOPHENOLATE MOFETIL 500 MG: 250 CAPSULE ORAL at 10:23

## 2018-08-30 RX ADMIN — NYSTATIN 1000000 UNITS: 100000 SUSPENSION ORAL at 15:54

## 2018-08-30 RX ADMIN — Medication: at 08:11

## 2018-08-30 RX ADMIN — VANCOMYCIN HYDROCHLORIDE 125 MG: KIT at 12:05

## 2018-08-30 RX ADMIN — SODIUM CHLORIDE 300 ML: 9 INJECTION, SOLUTION INTRAVENOUS at 08:11

## 2018-08-30 RX ADMIN — INSULIN GLARGINE 20 UNITS: 100 INJECTION, SOLUTION SUBCUTANEOUS at 10:23

## 2018-08-30 RX ADMIN — VANCOMYCIN HYDROCHLORIDE 125 MG: KIT at 15:51

## 2018-08-30 RX ADMIN — NYSTATIN 1000000 UNITS: 100000 SUSPENSION ORAL at 12:05

## 2018-08-30 RX ADMIN — PREDNISONE 5 MG: 5 TABLET ORAL at 20:18

## 2018-08-30 RX ADMIN — PREDNISONE 10 MG: 10 TABLET ORAL at 10:22

## 2018-08-30 RX ADMIN — PANTOPRAZOLE SODIUM 40 MG: 40 TABLET, DELAYED RELEASE ORAL at 10:23

## 2018-08-30 RX ADMIN — NYSTATIN 1000000 UNITS: 100000 SUSPENSION ORAL at 20:17

## 2018-08-30 RX ADMIN — EPOETIN ALFA 10000 UNITS: 10000 SOLUTION INTRAVENOUS; SUBCUTANEOUS at 09:26

## 2018-08-30 RX ADMIN — INSULIN ASPART 1 UNITS: 100 INJECTION, SOLUTION INTRAVENOUS; SUBCUTANEOUS at 18:22

## 2018-08-30 RX ADMIN — HYDRALAZINE HYDROCHLORIDE 75 MG: 50 TABLET ORAL at 13:19

## 2018-08-30 RX ADMIN — TACROLIMUS 3 MG: 1 CAPSULE ORAL at 10:23

## 2018-08-30 RX ADMIN — MYCOPHENOLATE MOFETIL 500 MG: 250 CAPSULE ORAL at 20:17

## 2018-08-30 RX ADMIN — NYSTATIN 1000000 UNITS: 100000 SUSPENSION ORAL at 10:22

## 2018-08-30 RX ADMIN — SODIUM CHLORIDE 250 ML: 9 INJECTION, SOLUTION INTRAVENOUS at 08:11

## 2018-08-30 RX ADMIN — HYDRALAZINE HYDROCHLORIDE 75 MG: 50 TABLET ORAL at 22:16

## 2018-08-30 RX ADMIN — INSULIN ASPART 1 UNITS: 100 INJECTION, SOLUTION INTRAVENOUS; SUBCUTANEOUS at 12:06

## 2018-08-30 RX ADMIN — Medication 50 MG: at 22:16

## 2018-08-30 RX ADMIN — VANCOMYCIN HYDROCHLORIDE 125 MG: KIT at 10:22

## 2018-08-30 RX ADMIN — VANCOMYCIN HYDROCHLORIDE 125 MG: KIT at 20:18

## 2018-08-30 RX ADMIN — Medication 1 CAPSULE: at 10:22

## 2018-08-30 RX ADMIN — TACROLIMUS 3 MG: 1 CAPSULE ORAL at 18:14

## 2018-08-30 RX ADMIN — ATORVASTATIN CALCIUM 20 MG: 20 TABLET, FILM COATED ORAL at 20:17

## 2018-08-30 ASSESSMENT — ACTIVITIES OF DAILY LIVING (ADL)
ADLS_ACUITY_SCORE: 13
ADLS_ACUITY_SCORE: 12

## 2018-08-30 NOTE — PLAN OF CARE
Problem: Patient Care Overview  Goal: Plan of Care/Patient Progress Review  Patient s/p colostomy 8/14. History of heart transplant (6/14/18), DM II and ESRD on HD. VSS, dull pain but refuses interventions. HD run this AM with 1100 ml removed, tolerated well. Low fiber diet. Colostomy bag changed this shift. Up to chair. Assist of 1. Anticipate discharge to TCU when hgb is stable. Continue to assess and monitor, notify Cards 2 with concerns.

## 2018-08-30 NOTE — PLAN OF CARE
Problem: Patient Care Overview  Goal: Plan of Care/Patient Progress Review  PT 6C: cancel - pt off unit throughout AM; attempted session in PM; pt declines stating 'I'm wiped out from dialysis.' Despite encouragement declines participation in therapy today.

## 2018-08-30 NOTE — PROGRESS NOTES
WOC Nurse Inpatient [facility]   WO Nurse Inpatient Adult      Follow Up Assessment   Assessment of new end Colostomy Stoma complication(s) none   Mucocutaneous junction; intact   Peristomal complication(s) none   Pouch wear time:3-4 days,   Following today's visit:Patient is  able to demonstrate;       1. How to empty their pouch? Yes, needs continued physical practice      2. How to change their pouch?  Yes, needs continued practice        Objective data:  Patient history according to medical record: 63 year old male with NICM s/p heart transplant 6/2018 on immunosuppressants, with ESRD on HD, R internal jugular thrombus previously on apixaban, T2DM, ongoing pseudomonal bacteremia admitted to cards service for painless BRBPR. Found to have diverticulitis with 7.8 cm pericolonic abscess without safe window for IR drain. 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 Post-op course complicated by ileus, now resolved with return of good bowel function. Was on heparin gtt for R internal jugular thrombus, however was stopped due to concern for bleeding with slowly downtrending Hgb     Current Diet/Nutrition:   Active Diet Order      Low Fiber Diet    TPN no   I/O last 3 completed shifts:  In: 440 [P.O.:440]  Out: 1300 [Other:1100; Stool:200]     Recent Labs   Lab Test  08/30/18   0724   08/27/18   0644   08/17/18   0644   07/18/18   0841   ALBUMIN   --    --   1.8*   < >   --    < >  2.9*   HGB  7.3*   < >  7.5*   < >  9.8*   < >  7.5*   INR   --    --   1.07   < >   --    < >   --    WBC  3.8*   < >  5.2   < >  6.5   < >  1.1*   A1C   --    --    --    --    --    --   7.0*   CRP   --    --    --    --   270.0*   < >   --     < > = values in this interval not displayed.          Physical Exam:  Current pouching system:Shashi 1 piece flat 20% melted in 2 days  Reason for  pouch change today: ostomy education and routine schedule  Stoma appearance: red, oval and moist; protuberant   Stoma size; 35mm x 41mm with adequate protrusion   MCJ- minimal separation from 1-5 O'clock   Peristomal skin: mottled light purple bruising on left side- resolved  Stoma output : moderate amount brown loose  Abdominal  Assessment  soft   NG still in place? No  Surgical Site: not assessed   Pain: patient denies pain today   Is patient still on a PCA No     Interventions:  Patient's chart evaluated.  Focus of today's visit: return demonstration of pouch change; pouch emptying  Participant of teaching session today patient   Orders: Reviewed  Change made with ostomy management today: No- continued with [place] 1 pc flat cut to fit fecal pouch   Patient/family: active participation and performed with minimal verbal cues.   Pt actively participated and did a great job, only needed assistance with measuring the stoma and aligning the pouch in the center.  Supplies:Gathered and at bedside     Plan:  Learning needs: continued reinforcement of ostomy pouch change and emptying; pt able to do independently  Preparation for discharge: No discharge preparations started  Recommend home care? yes     Discussed plan of care with Patient and Nurse  Nursing to notify the Provider(s) and re-consult the WOC Nurse if new ostomy concerns or discharge planned before next planned WOC visit.                                           WOC Nurse will return: Tuesday for discharge instructions

## 2018-08-30 NOTE — PROGRESS NOTES
HEMODIALYSIS TREATMENT NOTE    Date: 8/30/2018  Time: 1130    Data:  Pre Wt: 80.6 kg  Desired Wt: 79.5 kg   Post Wt:   Weight gain:   Weight change:   Ultrafiltration - Post Run Net Total Removed (mL): 1100 mL  Ultrafiltration - Post Run Net Total Gain (mL): 0 mL  Vascular Access Status: Yes, secured and visible  Dialyzer Rinse: Light  Total Blood Volume Processed: 78.8 L  Total Dialysis (Treatment) Time:  3.5 hours    Lab: No    Assessment/Interventions: 3.5 hour HD run.  Blood flow 400 mL/min.  1.1 kg removed.  Pt hypertensive at beginning of run; improved by end of run.  Pt given Epogen during run.  Report called.       Plan:  Continue with plan of care.  Next run per Renal Team.

## 2018-08-30 NOTE — PROGRESS NOTES
[facility]  Transplant Infectious Disease Progress Note     Patient:  Murray Nicholson, Date of birth 1955, Medical record number [id number]  Date of Visit:  08/30/2018         Assessment and Recommendations:   Recommendations:  - Continue treatment for C difficile with PO vancomycin for 10 days following cessation of broad-spectrum coverage (through 9/6)  - Continue valganciclovir at prophylaxis dosing until 3 months post transplant (9/14/18).  - Continue pentamidine for PJP prophylaxis.  If he has stable counts moving forward, would be reasonable to re-assess bactrim.  - For pulmonary nodule that has increased in size, would recommend checking serum cryptococcal antigen and histoplasma urine antigen (ordered for you) and would schedule follow up in pulmonary nodule clinic (4-6 weeks rather than 6 months due to post-transplant status)  - Obtain blood cultures with any fevers     Please call with questions. ID will sign off for now.  Please call with questions.      Assessment:   63-year-old gentleman with history of ischemic/valvular cardiomyopathy s/p OHT on 6/14/18 (induction with solumedrol and maintenance with tacrolimus, MMF, and prednisone), ESRD on TThSa hemodialysis currently listed for kidney transplantation, history of polymicrobial peritonitis with Candida glabrata when previously on peritoneal dialysis in 1/2018, recent Pseudomonas aeruginosa bacteremia on 7/26/18 with retained dialysis catheter, hypertension, hyperlipidemia, and type 2 diabetes who was admitted on 8/12 with subjective fevers, severe lower abdominal pain, and bloody stools and now ow C diff positive.  He is s/p laparoscopic abscess drainage, sigmoidectomy, and partial small bowel resection on 8/14, with polymicrobial operative cultures growing Citrobacter freundii complex, VRE, Klebsiella pneumoniae, pseudomonas aeruginosa and Veillonella.  Murray continues to make progress, remains afebrile, some bloody stools  in the colostomy bag.  Teams working w/ GI in re-assessing this issue.      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  Throughout this time period, he had been on broad-spectrum coverage with cefepime, which should have suppressed many enteric organisms, though with holes in anaerobic coverage, that could explain progression of symptoms. Underlying lesions seems likely to be related to diverticulosis.  Taken to the OR on 8/14/18 for laparoscopic drainage of pericolonic and perirectal abscesses, as well as sigmoidectomy, end colostomy, and partial small bowel resection. Fluid cultures obtained in the OR (8/14/18)  Grew  Citrobacter freundii complex, VRE, Klebsiella pneumoniae, pseudomonas aeruginosa and Veillonella. Patient has remained afebrile in post-op period, with subjective improvement in abdominal pain.  He completed 2 weeks of broad-spectrum antibiotics (timed from OR) on 8/27.  Question whether small bowel enhancement seen on CT related to known small bowel resection.      # C diff positive diarrhea:  Commenced therapy with oral vancomycin on 8/13/18 and planning to continue for 10 days after completion of broad-spectrum course for intraabdominal infection.     #  Pseudomonas aeruginosa bacteremia associated with a hemodialysis catheter that was retained: History of positive blood cultures from 7/26 but by report, there have not been more recent positive cultures since then.  Had long course of antibiotics active against Pseudomonas (cefepime through 8/23, then cipro through 8/27).  There is still a risk that he may have  recrudescence of bacteremia as result of biofilm formation on the line that was retained so repeat cultures with any fevers.     # New small (3mm and 5mm) pulmonary nodules: 3mm nodule increased in size to 6mm on 8/29 CT enterography.  Serum CrAg and urine histo ag pending.  Recommend follow up in pulmonary nodule clinic.    # Oral ulcer: Viral testing during last admission was negative.  Wound culture grew Pseudomonas in addition to oral tiffany. Lesion appears to have decreased in size since earlier in hospitalization.     Other ID issues:  - Viral serostatus & prophylaxis: HSV1-, HSV2+, CMV D+/R-, EBV D?/R+, VZV+.  Ganciclovir until tolerating PO.  - PJP: TMP-SMX  - Isolation status: Enteric for C difficile.    Staffed with Dr. Blank.    Juliana Alexander MD, PhD  Adult & Pediatric Infectious Diseases Fellow PGY7, [facility]  Pager: [phone]    Attestation:  I have reviewed today's vital signs, medications, labs and imaging.      Floor time: 25 minutes, Face-to-face time: 10 minutes, Total time: 35 minutes  Rahul Blank,   Pager [phone]  Murray Nicholson was seen in the hospital by Rahul Blank on August 30th with Dr. Alexander.  I reviewed the history & exam. Assessment and plan were jointly made.  I agree with and have edited the fellow's note and plan of care.  Rahul Blank MD.            Interval History:     No fevers.  Ate pancakes and sausage today.  Having good output from ostomy.  Dialyzed today.  No n/v or new rashes.  Declined PT due to fatigue after dialysis. No cough / sob.  Mild abdominal discomfort.      Transplants:  6/14/2018 (Heart), Postoperative day:  77.  Coordinator Britni Mcknight    Immunosuppression: Mycophenolate 500 mg twice daily, tacrolimus 3mg BID (Goal 6-8 due to infection).  Prednisone 10/5 mg         Current Medications & Allergies:       atorvastatin  20 mg Oral QPM     fluticasone  1-2 spray Both Nostrils Daily     hydrALAZINE  75 mg Oral Q8H JEAN     insulin  aspart  1-6 Units Subcutaneous TID w/meals     insulin aspart  1-5 Units Subcutaneous At Bedtime     insulin glargine  20 Units Subcutaneous QAM AC     mycophenolate  500 mg Oral BID     NEPHROCAPS  1 capsule Oral Daily     nystatin  1,000,000 Units Swish & Swallow 4x Daily     pantoprazole  40 mg Oral QAM AC     predniSONE  10 mg Oral Daily     predniSONE  5 mg Oral Daily     sodium chloride (PF)  3 mL Intracatheter Q8H     sodium chloride (PF)  3 mL Intracatheter Q8H     tacrolimus  3 mg Oral BID IS     triamcinolone   Topical BID     valGANciclovir  450 mg Oral Once per day on Tue Sat     vancomycin  125 mg Oral 4x Daily     Infusions/Drips:    IV fluid REPLACEMENT ONLY       IV fluid REPLACEMENT ONLY       Allergies   Allergen Reactions     Norco [Hydrocodone-Acetaminophen] Nausea and Vomiting     Cats      Throat tightness     Isosorbide Other (See Comments)     hypotension     Penicillins Hives     Seasonal Allergies      rhinitis     Shrimp      Throat closes           Review of Systems:   As per Interval History.  Complete ROS is otherwise negative.         Physical Exam:   Vitals were reviewed and are stable.  Vital sign ranges over the past 24 hours:  Temp:  [98  F (36.7  C)-99.6  F (37.6  C)] 98.8  F (37.1  C)  Heart Rate:  [] 99  Resp:  [16-18] 18  BP: (121-164)/() 130/88  Cuff Mean (mmHg):  [] 111  SpO2:  [97 %-100 %] 100 %    Physical Examination:  GENERAL: Pleasant and cooperative, resting in bed. No acute distress. Comfortable on room air, working with instruction on changing the ostomy site bag, good fine motor skills.   EYES:  EOMI, anicteric sclerae  ENT:  Oral ulcer on hard palate, improving.   LUNGS:  Clear to auscultation bilaterally  CARDIOVASCULAR:  Regular rate and rhythm with no murmur.  Well-healed surgical scars.    ABDOMEN: Increased bowel sounds.  Less tender.   LLQ colostomy with clean stoma, bag currently being changed.    SKIN:  No acute rash.  NEUROLOGIC:  Alert,  oriented x3, good historian.         Laboratory Data:     CD4 counts  No results found for: ACD4  Inflammatory Markers    Recent Labs   Lab Test  08/17/18   0644  08/14/18   0620  08/12/18   0230  07/26/18   1732  07/02/18   0601  06/30/18   0852  07/05/17   1204  05/31/17   1111   01/13/16   1337   SED   --    --   72*   --    --    --    --    --    --   80*   CRP  270.0*  230.0*  76.0*  270.0*  7.3  12.0*  35.4*  15.9*   < >   --     < > = values in this interval not displayed.       Immune Globulin Studies    Recent Labs   Lab Test  05/19/15   1000   IGG  1380   IGM  108   IGA  203       Metabolic Studies       Recent Labs   Lab Test  08/30/18   0724  08/29/18   0622  08/28/18   0710  08/27/18   0644  08/26/18   0653  08/25/18   0821  08/24/18   0634   08/22/18   0741  08/21/18   0633  08/20/18   1105   NA  131*  133  129*  129*  130*  125*  127*   < >  128*  128*  134   POTASSIUM  3.9  4.3  5.2  4.7  4.3  3.9  3.9   < >  3.3*  3.8  3.6   CHLORIDE  97  97  94  94  94  90*  91*   < >  93*  94  98   CO2  24  26  25  24  26  26  24   < >  24  23  26   ANIONGAP  10  10  10  11  11  9  11   < >  10  10  10   BUN  47*  40*  66*  56*  38*  59*  49*   < >  46*  45*  21   CR  5.29*  4.09*  5.82*  4.66*  3.40*  4.30*  3.36*   < >  3.22*  3.66*  2.20*   GFRESTIMATED  11*  15*  10*  13*  18*  14*  19*   < >  20*  17*  30*   GLC  111*  214*  142*  185*  165*  217*  202*   < >  256*  294*  209*   TRINI  7.6*  7.6*  7.9*  7.9*  8.0*  7.6*  7.9*   < >  7.3*  7.8*  8.8   PHOS   --   3.1   --   3.1   --    --   3.6   --   2.7  2.9  1.5*   MAG  1.8  1.9   --   2.3  1.9  1.7  1.8   < >  1.8  2.2  1.7    < > = values in this interval not displayed.       Hepatic Studies    Recent Labs   Lab Test  08/27/18   0644  08/20/18   1105  08/14/18   0620  08/13/18   0618  08/08/18   0921  07/29/18   0523  07/26/18   1732   BILITOTAL  0.5  0.4  0.3  0.3  0.4  0.4  0.7   ALKPHOS  133  170*  110  99  156*  106  95   ALBUMIN  1.8*  1.7*  1.6*   1.6*   --   1.9*  2.3*   AST  16  41  29  28  34  26  18   ALT  13  18  17  17  22  21  19       Hematology Studies      Recent Labs   Lab Test  08/30/18   0724  08/29/18   1612  08/29/18   0622  08/28/18   0710  08/27/18   0644  08/26/18   0653   08/25/18   0821   WBC  3.8*   --   3.4*  5.1  5.2  5.8   --   5.5   ANEU  2.9   --   2.6  4.2  4.3  4.9   --   4.8   ALYM  0.4*   --   0.3*  0.3*  0.5*  0.2*   --   0.3*   DK  0.5   --   0.4  0.6  0.3  0.7   --   0.3   AEOS  0.1   --   0.0  0.0  0.0  0.0   --   0.0   HGB  7.3*  7.2*  6.5*  7.0*  7.5*  7.7*   < >  7.1*   HCT  22.3*   --   19.8*  21.5*  22.8*  23.0*   --   21.0*   MCV  96   --   97  96  95  93   --   92   PLT  51*   --   60*  73*  91*  74*   --   71*    < > = values in this interval not displayed.       Clotting Studies    Recent Labs   Lab Test  08/27/18   0644  08/20/18   1105  08/12/18   0834  08/12/18   0230   06/29/18   0545  06/28/18   0554  06/27/18   0629   INR  1.07  0.99  1.31*  1.25*   < >   --   1.15*  1.10   PTT   --    --   37   --    --   30  29  29    < > = values in this interval not displayed.       Pathology   8/14/18:   A. SMALL BOWEL WITH FISTULA, RESECTION:   - Small bowel wall with fistulous tract lined by inflamed granulation  tissue   - Margins viable   B. SIGMOID COLON, SIGMOIDECTOMY:   - Colonic wall with diverticulosis and associated perforations lined by  acute and chronic inflammation,  foreign body giant cell reaction and granulation tissue formation   - Margins viable        Microbiology  Abdominal abscess fluid    8/14 Light growth of citrobacter freundii complex, VRE, Klebsiella pneumoniae, and Pseudomonas aeruginosa. Veillonella, mixed tiffany    Blood                         6/14/18 dialysis catheter tip 2 strains CoNS                         7/26/18 x2 from [facility] Pseudomonas aeruginosa (Microbiology report obtained from [facility] and copy placed in paper chart.  Pan-sensitive).                         7/26/18 x2 [facility]  negative                         7/28/18 x2 negative                         8/10/18 ×2 negative                         18 ×3 negative     Wound                         18: Mouth wound with pansensitive pseudomonas aeruginosa and normal tiffany                         18 Mouth ulcer     Peritoneal fluid                         18 13 yellow fluid with 4736 WBCs, 80% neutrophils.  Gram stain with no organisms and many WBCs.  Culture with Candida glabrata and Bacteroides caccae.                         1/15/18: Peritoneal fluid with 4256 WBCs, 91% neutrophils.  Gram stain with many WBCs and no organisms seen.  Culture with Staphylococcus epidermidis, Candida glabrata    Enteric  Enteric LOGAN   18 negative  Giardia ag   18 negative  C diff   18 positive  Cryptosporidium ag   18 pending  Microsporidium ag   18 pending     Virology  CMV blood PCR                         18 negative                         18 negative  EBV blood PCR                         18 negative  Parvovirus blood PCR                          18 negative  VZV blood PCR                         18 negative     Imagin/29 CT enterography  1. Redemonstration of the enhancing lesion in the left lower quadrant small bowel measuring up to 1.4 cm. This may represent a small polyp or GIST.  2. Interval improvement in the previously seen dilation of the small bowel. Stable postoperative changes of partial sigmoidectomy and left lower quadrant colostomy.  3. Increased size of a 6 mm right lower lobe pulmonary nodule, which previously measured 3 mm. The relatively short period of time and significant increase in size favors an infectious or inflammatory etiology, however this is not definitive. Additional sub-5 mm pulmonary nodules are unchanged. Recommend three-month follow-up CT.  4. Multiple unchanged intraductal pancreatic mucinous neoplasms.  5. Decreased small left pleural effusion.

## 2018-08-30 NOTE — PLAN OF CARE
Problem: Patient Care Overview  Goal: Plan of Care/Patient Progress Review  OT: Pt politely refused OT tx session 2/2 fatigue. OT will reschedule for 8/31/18.

## 2018-08-30 NOTE — PROGRESS NOTES
[facility]   Cardiology II Service / Advanced Heart Failure  Daily Progress Note      Patient: Murray Nicholson  MRN: [id number]  Admission Date: 8/12/2018  Hospital Day # 18    Assessment and Plan: Murray Nicholson is a 63 year old male with a h/o NICM s/p heart txp (6/14/18), ESRD on HD, R internal jugular thrombus on apixaban and DM2 p/w 3-4 bright red BM and fevers/chills, found to have a daniella-colonic abscess (7.8 cm), daniella-rectal abscess (2.7 cm), probable diverticulitis, and R colon colitis.    Today's Plan:  -HD today  -low threshold to repeat blood cultures  -discharge to ARU versus TCU when bed available      # Daniella-colonic abscess (7.8 cm) s/p laparoscopic assisted sigmoid colectomy with end colostomy 8/14) positive for VRE  # Daniella-rectal abscess (2.7 cm) s/p drainage 8/12  # R colon colitis  # Positive c. Diff  s/p two week course cipro, linezolid, flagyl 8/14-8/27. CT enterography given low grade temp overnight and WBC 3.4 was without abscess, and interval improvement of dilated bowel.   - Continue PO vanc x 10 days after abx d/c'ed (through 9/6)  - Miralax daily per GI, APAP, tramadol PRN pain  - Colorectal, transplant ID following, appreciate assistance  - Low residual diet with supplements    # Recent right internal jugular line-associated thrombus  # Acute anemia  # Melena, resolved  Noted to have melena in ostomy, holding heparin gtt since 8/22. Transfuse for Hgb <7. S/p 1 unit pRBC 8/29 for hgb 6.5, likely partially dilutional after IVF, improved now stable 7.3  - Continue to hold heparin gtt and ASA  - Per CRS, no scope due to risk of disruption anastomosis, we prefer to defer (despite drop in hgb) due to leukopenia    # Status post OHT on 6/13/18, history of ICM  # Donor three vessel CAD  Immunosuppression:   --continue prednisone 10 mg in AM and 5 mg in PM then per taper with negative bx  --continue Cellcept 500 mg BID  --continue tacrolimus 3 mg bid (goal 6-8 due to infection), daily  troughs - pending today  --repeat DSAs pending    Prophylaxis:   --CAV: aspirin, atorvastatin 20 mg daily (cost concern with rosuva)  --Thrush: Nystatin swish and swallow  --PCP: s/p pentamidine 8/17, due 9/14  --GI: Protonix   --CMV: valcyte 450 mg Tues and Sat, renally dosed, ideally 6 mos post-Tx  --Bones: calcium/vitamin D      Serostatus: HSV1-, HSV1+, CMV D+/R-, EBV D?/R+, VZV+    Biopsies: negative 8/24, due 9/7    # Malnutrition  - nutrition following, continue calorie counts     # Recent pseudomonas bacteremia  # Necrotic mouth ulcer, resolving  Had profound neutropenia and pseudomonas bacteremia several weeks ago in associated with necrotic mouth ulcer; neutropenia likely immunosuppression-induced. Neutropenia resolved last admission and pseudomonal bacteremia is likely cleared. Necrotic ulcer appears to be resolving from prior admission. Not currently active issues but will continue to monitor. Low threshold to repeat cultures.      # Pulmonary nodules  Stable nodule but also new RLL nodules 5 mm and 3 mm noted incidentally on CT 8/12 and increased in size on 8/29 CT enterography, will need interval follow up in 4-6 weeks (~9/16).     # ESRD on TTS HD  - Nephrology consulted, appreciate assistance     # HTN  - at goal on hydralazine 75 mg tid (decreased from qid on 8/27)    # DM2   - Glargine increased 8/24, medium sliding scale insulin  - Monitor, may need adjustment now that taking PO     FEN: Advance as tolerated, low residue  PPx: Holding heparin for now, SCDs ordered  Dispo: PT/OT recommending TCU at DC    ================================================================    Subjective/24-Hr Events:   Last 24 hr care team notes reviewed. No complaints or concerns today. He reports feeling well except from an activity standpoint, agreeable to ARU/TCU. Denies feeling feverish, chilled, no fevers overnight, no nausea or vomiting. Denies blood in ostomy.     ROS:  4 point ROS including respiratory, CV, GI  "and  (other than that noted in the HPI) is negative.     Medications: Reviewed in EPIC.     Physical Exam:   BP (!) 153/97 (BP Location: Left arm)  Pulse 106  Temp 98.9  F (37.2  C) (Oral)  Resp 18  Ht 1.753 m (5' 9\")  Wt 80.6 kg (177 lb 11.1 oz)  SpO2 97%  BMI 26.24 kg/m2    GENERAL: Appears comfortable, in no distress, chronically ill.  HEENT: Eye symmetrical, no discharge or icterus bilaterally. Mucous membranes moist and without lesions.  NECK: Supple, JVD not visible at 90 degrees.   CV: Tachycardic but regular, +S1S2, no murmur, rub, or gallop.   RESPIRATORY: Respirations regular, even, and unlabored. Lungs CTA throughout.   GI: Soft and mildly distended with normoactive bowel sounds present in all quadrants. No tenderness, rebound, guarding. Stoma site CDI, output dark green/brown without overt melena.  EXTREMITIES: Trace peripheral edema. 2+ bilateral pedal pulses. Warm.   NEUROLOGIC: Alert and oriented x 3. No focal deficits.   MUSCULOSKELETAL: No joint swelling or tenderness.   SKIN: No jaundice. No rashes or lesions. Ulcer on hard palate still present with yellow sloughing.     Labs:  CMP    Recent Labs  Lab 08/30/18  0724 08/29/18  0622 08/28/18  0710 08/27/18  0644 08/26/18  0653 08/25/18  0821 08/24/18  0634   * 133 129* 129* 130* 125* 127*   POTASSIUM 3.9 4.3 5.2 4.7 4.3 3.9 3.9   CHLORIDE 97 97 94 94 94 90* 91*   CO2 24 26 25 24 26 26 24   ANIONGAP 10 10 10 11 11 9 11   * 214* 142* 185* 165* 217* 202*   BUN 47* 40* 66* 56* 38* 59* 49*   CR 5.29* 4.09* 5.82* 4.66* 3.40* 4.30* 3.36*   GFRESTIMATED 11* 15* 10* 13* 18* 14* 19*   GFRESTBLACK 13* 18* 12* 15* 22* 17* 23*   TRINI 7.6* 7.6* 7.9* 7.9* 8.0* 7.6* 7.9*   MAG  --  1.9  --  2.3 1.9 1.7 1.8   PHOS  --  3.1  --  3.1  --   --  3.6   PROTTOTAL  --   --   --  5.2*  --   --   --    ALBUMIN  --   --   --  1.8*  --   --   --    BILITOTAL  --   --   --  0.5  --   --   --    ALKPHOS  --   --   --  133  --   --   --    AST  --   --   --  " 16  --   --   --    ALT  --   --   --  13  --   --   --        CBC    Recent Labs  Lab 08/30/18  0724 08/29/18  1612 08/29/18  0622 08/28/18  0710 08/27/18  0644   WBC 3.8*  --  3.4* 5.1 5.2   RBC 2.32*  --  2.05* 2.24* 2.41*   HGB 7.3* 7.2* 6.5* 7.0* 7.5*   HCT 22.3*  --  19.8* 21.5* 22.8*   MCV 96  --  97 96 95   MCH 31.5  --  31.7 31.3 31.1   MCHC 32.7  --  32.8 32.6 32.9   RDW 19.9*  --  19.4* 19.2* 18.8*   PLT 51*  --  60* 73* 91*       INR    Recent Labs  Lab 08/27/18  0644   INR 1.07       Time/Communication  I personally spent a total of 25 minutes. Of that 15 minutes was counseling/coordination of patient's care. Plan of care discussed with patient. See my note above for details.    Patient discussed with Dr. Ernst.      Ramya Suh, FRANK, NP-C  Advanced Heart Failure/Cardiology II Service  Pager [phone]

## 2018-08-30 NOTE — PROGRESS NOTES
Nephrology Progress Note  08/30/2018       Murray Nicholson is a 63 year old male with Heart transplant 6/18, ESRD on HD, HTN, Right internal jugular thrombus on Aphixaban, recent admission 7/26-8/6/18  for pseudomonas bacteremia felt to be 2/2 probable prececal colitis and necrotic mouth ulcer, admitted 8/12/18 with abdominal pain, bloody stool and fever, found to have C diff infection, anorectal abscess and pericolonic abscess.        ASSESSMENT AND RECOMMENDATIONS:       ESRD - Etiology of ESRD 2/2 HTN, DM, CRS.   Has chronic OP HD at [facility], TTS schedule, under care of Dr Mcpherson. Right TDC, EDW increasing to 79.5 kg   - Will continue TTS schedule   - No heparin       Volume status: EDW 79.5 kg. Appears mildly hypervolemic with 1+ upper and lower extremity edema and elevated BP's, however RHC on 8/24 with low filling pressures (RA 1). Anuric. Has colostomy.   - Continue pre run weights, standing   - Strict I/O  -  EDW increased to 79.5 kg based on RHC with low filling pressures        Hypervolemic hyponatremia: ESRD and inability to excrete free water        BP: 130-150's  - Per cards, on hydralazine 75 mg tid         Transplant IS regimen: Tac, MMF, Pred      BMD: Ca 7.6, alb 1.8, phos 3.1    GIB: getting PRBC today   Anemia, acute on chronic: hgb 7.3 with GIB s/p PRBC 8/29  - will increase epogen to 10,000 units  - Transfusions per primary team           Pericolonic abscess, small perircal abscess: Underwent I/D anorectal abscess on 8/13 and  Sigmoidectomy/end colostomy on 8/14.      C diff: on PO vanc      Lung nodules - New Right lower lobe pulmonary nodules seen on CT this admission. ID recommending close f/u.         Recommendations were communicated to primary team via progress note and phone conversation.    Kristi Armas PA-C  Division of Kidney Disease  Pager [phone]          Interval History :   Seen on dialysis, stable run.  Feeling a bit better; eating large meal; denies CP, SOB,  "chills      Review of Systems:   4 point ROS negative other than as noted above.    Physical Exam:   I/O last 3 completed shifts:  In: 440 [P.O.:440]  Out: 1300 [Other:1100; Stool:200]   /88 (BP Location: Left arm)  Pulse 106  Temp 98.8  F (37.1  C) (Oral)  Resp 18  Ht 1.753 m (5' 9\")  Wt 80.6 kg (177 lb 11.1 oz)  SpO2 100%  BMI 26.24 kg/m2     GENERAL APPEARANCE: alert, NAD  EYES: no scleral icterus, pupils equal  Pulmonary: lungs CTA  CV: RRR   - Edema 1+ upper and lower extremity edema  GI: soft, colostomy/stoma   MS: no evidence of inflammation in joints, no muscle tenderness  SKIN: no rash, warm, dry, no cyanosis  NEURO: alert/oriented  ACCESS: Right TDC    Labs:   All labs reviewed by me  Electrolytes/Renal -   Recent Labs   Lab Test  08/30/18   0724  08/29/18   0622  08/28/18   0710  08/27/18   0644   08/24/18   0634   NA  131*  133  129*  129*   < >  127*   POTASSIUM  3.9  4.3  5.2  4.7   < >  3.9   CHLORIDE  97  97  94  94   < >  91*   CO2  24  26  25  24   < >  24   BUN  47*  40*  66*  56*   < >  49*   CR  5.29*  4.09*  5.82*  4.66*   < >  3.36*   GLC  111*  214*  142*  185*   < >  202*   TRINI  7.6*  7.6*  7.9*  7.9*   < >  7.9*   MAG  1.8  1.9   --   2.3   < >  1.8   PHOS   --   3.1   --   3.1   --   3.6    < > = values in this interval not displayed.       CBC -   Recent Labs   Lab Test  08/30/18   0724  08/29/18   1612  08/29/18   0622  08/28/18   0710   WBC  3.8*   --   3.4*  5.1   HGB  7.3*  7.2*  6.5*  7.0*   PLT  51*   --   60*  73*       LFTs -   Recent Labs   Lab Test  08/27/18   0644  08/20/18   1105  08/14/18   0620   ALKPHOS  133  170*  110   BILITOTAL  0.5  0.4  0.3   ALT  13  18  17   AST  16  41  29   PROTTOTAL  5.2*  6.1*  5.3*   ALBUMIN  1.8*  1.7*  1.6*       Iron Panel -   Recent Labs   Lab Test  07/10/18   0711  05/08/18   0954  07/19/17   1306   IRON  66  58  46   IRONSAT  28  27  18   ALBERTINA  771*  621*  369       Current Medications:    atorvastatin  20 mg Oral QPM     " fluticasone  1-2 spray Both Nostrils Daily     hydrALAZINE  75 mg Oral Q8H JEAN     insulin aspart  1-6 Units Subcutaneous TID w/meals     insulin aspart  1-5 Units Subcutaneous At Bedtime     insulin glargine  20 Units Subcutaneous QAM AC     mycophenolate  500 mg Oral BID     NEPHROCAPS  1 capsule Oral Daily     nystatin  1,000,000 Units Swish & Swallow 4x Daily     pantoprazole  40 mg Oral QAM AC     predniSONE  10 mg Oral Daily     predniSONE  5 mg Oral Daily     sodium chloride (PF)  3 mL Intracatheter Q8H     sodium chloride (PF)  3 mL Intracatheter Q8H     tacrolimus  3 mg Oral BID IS     triamcinolone   Topical BID     valGANciclovir  450 mg Oral Once per day on Tue Sat     vancomycin  125 mg Oral 4x Daily       IV fluid REPLACEMENT ONLY       IV fluid REPLACEMENT ONLY       Kristi Armas PA-C

## 2018-08-30 NOTE — PLAN OF CARE
Problem: Patient Care Overview  Goal: Plan of Care/Patient Progress Review  Outcome: No Change    D: Pt admitted 8/12 with abdominal pain and GIB. PMH: NICM s/p heart txp, ESRD on HD, R internal jugular thrombus, DM II.     I/A: No acute events overnight. A&Ox4. Vital signs stable on RA. Monitor shows sinus rhythm/sinus tachycardia, HR 90's-100's. Dull abdominal pain managed with PRN Tramadol. Abdominal CT completed. Oliguric. Moderate amount of dark brown stool in colostomy. Continues on low fiber diet. Blood sugars managed with sliding scale insulin. Refused 0200 blood sugar check. Trending Hgb. Up with assist of 1. Pt requesting to not be disturbed [phone], slept well through the night.    P: Plan for dialysis today. Continue to monitor. Notify Cards 2 with changes/concerns.

## 2018-08-30 NOTE — PLAN OF CARE
Problem: Patient Care Overview  Goal: Plan of Care/Patient Progress Review  OT: Pt at dialysis, OT will check back as available and appropriate or reschedule for 8/31/18

## 2018-08-30 NOTE — PROGRESS NOTES
Colorectal Surgery Progress Note  POD#18    Subjective:  Feeling better, dizziness improved slightly, ambulating more. No nausea or vomiting. No fevers or chills.    Vitals:  Vitals:    08/29/18 1546 08/29/18 1716 08/29/18 2215 08/30/18 0400   BP: 122/74 126/76 127/76    BP Location: Left arm Left arm Left arm    Pulse:       Resp: 18 18 16 16   Temp: 97.8  F (36.6  C) 98  F (36.7  C) 98.3  F (36.8  C)    TempSrc: Axillary Oral Oral    SpO2: 95% 100% 97%    Weight:       Height:           I/O:  I/O last 3 completed shifts:  In: 200 [P.O.:200]  Out: 500 [Other:300; Stool:200]    Physical Exam:  Gen: AAOx3, NAD, lying in bed, conversant  Pulm: Non-labored breathing  Abd: Soft, non-distended, non-tender   Incision C/D/I with dermabond    Stoma pink and viable with dark semi-solid stool and gas in bag  Ext:  Warm and well-perfused    BMP    Recent Labs  Lab 08/29/18  0622 08/28/18  0710 08/27/18  0644 08/26/18  0653 08/25/18  0821 08/24/18  0634    129* 129* 130* 125* 127*   POTASSIUM 4.3 5.2 4.7 4.3 3.9 3.9   CHLORIDE 97 94 94 94 90* 91*   CO2 26 25 24 26 26 24   BUN 40* 66* 56* 38* 59* 49*   CR 4.09* 5.82* 4.66* 3.40* 4.30* 3.36*   * 142* 185* 165* 217* 202*   MAG 1.9  --  2.3 1.9 1.7 1.8   PHOS 3.1  --  3.1  --   --  3.6     CBC    Recent Labs  Lab 08/29/18  1612 08/29/18  0622 08/28/18  0710 08/27/18  0644 08/26/18  0653   WBC  --  3.4* 5.1 5.2 5.8   HGB 7.2* 6.5* 7.0* 7.5* 7.7*   HCT  --  19.8* 21.5* 22.8* 23.0*   PLT  --  60* 73* 91* 74*         ASSESSMENT: This is a 63 year old male with NICM s/p heart transplant 6/2018 on immunosuppressants, with ESRD on HD, R internal jugular thrombus previously on apixaban, T2DM, hx pseudomonal bacteremia admitted to cards service for painless BRBPR. Found to have diverticulitis with 7.8 cm pericolonic abscess without safe window for IR drain. 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 Post-op course complicated by ileus, and concern for bleeding after initiation of anticoagulation, with slowly down-trending Hgb. Has received transfusion of PRBC x3. Ileus now resolved with return of bowel function. Having some issues with dizziness, which seem to be resolving with less fluid removed during dialysis. Appreciate care per primary team.     - Continue low residue diet and encourage supplements as tolerated  - Schedule Miralax daily  - Follow-up AM hgb. Plan to hold anticoagulation and outpatient endoscopy if no further transfusion requirements. If requires blood transfusion will consider inpatient EGD and colonoscopy to investigate possible source. Could consider EPO to avoid further blood transfusions and prevent additional antigen exposure.     Please call with further questions or concerns. Colorectal Surgery will continue to follow.     Eri Fairchild MD   General Surgery Resident, PGY1  Pager [phone]

## 2018-08-30 NOTE — PLAN OF CARE
Problem: Patient Care Overview  Goal: Plan of Care/Patient Progress Review  Outcome: No Change  Neuro: Patient is alert and oriented.   Cardiac: Normal Sinus Rhythm/Sinus Tachycardia VSS. Afebrile.    Respiratory: Sating 95% on RA. Lung sounds clear/diminished.   GI/: Minimal urine output. Colostomy with dark black tarry stool.   Diet/appetite: Tolerating low fiber diet. Eating well.  Activity:  Assist of 1, up to chair and in halls.  Pain: Patient denies any pain  Skin: No new deficits noted.  LDA's:  PIV x 2   CVC     CT scheduled for 1920 patient currently drinking contrast.   Plan: Continue with POC. Notify primary team with changes.      Problem: Surgery Nonspecified (Adult)  Goal: Signs and Symptoms of Listed Potential Problems Will be Absent, Minimized or Managed (Surgery Nonspecified)  Signs and symptoms of listed potential problems will be absent, minimized or managed by discharge/transition of care (reference Surgery Nonspecified (Adult) CPG).   Outcome: No Change   08/29/18 1902   Surgery Nonspecified   Problems Assessed (Surgery) all   Problems Present (Surgery) bleeding/anemia;situational response

## 2018-08-31 ENCOUNTER — APPOINTMENT (OUTPATIENT)
Dept: OCCUPATIONAL THERAPY | Facility: CLINIC | Age: 63
DRG: 329 | End: 2018-08-31
Payer: MEDICARE

## 2018-08-31 LAB
ALBUMIN SERPL-MCNC: 1.6 G/DL (ref 3.4–5)
ALP SERPL-CCNC: 133 U/L (ref 40–150)
ALT SERPL W P-5'-P-CCNC: 14 U/L (ref 0–70)
ANION GAP SERPL CALCULATED.3IONS-SCNC: 7 MMOL/L (ref 3–14)
AST SERPL W P-5'-P-CCNC: 17 U/L (ref 0–45)
BASOPHILS # BLD AUTO: 0 10E9/L (ref 0–0.2)
BASOPHILS NFR BLD AUTO: 0 %
BILIRUB DIRECT SERPL-MCNC: 0.2 MG/DL (ref 0–0.2)
BILIRUB SERPL-MCNC: 0.5 MG/DL (ref 0.2–1.3)
BUN SERPL-MCNC: 24 MG/DL (ref 7–30)
CALCIUM SERPL-MCNC: 7.4 MG/DL (ref 8.5–10.1)
CHLORIDE SERPL-SCNC: 98 MMOL/L (ref 94–109)
CK SERPL-CCNC: 37 U/L (ref 30–300)
CMV DNA SPEC NAA+PROBE-ACNC: NORMAL [IU]/ML
CMV DNA SPEC NAA+PROBE-LOG#: NORMAL {LOG_IU}/ML
CO2 SERPL-SCNC: 29 MMOL/L (ref 20–32)
COPATH REPORT: NORMAL
CREAT SERPL-MCNC: 3.81 MG/DL (ref 0.66–1.25)
CRYPTOC AG SPEC QL: NORMAL
DIFFERENTIAL METHOD BLD: ABNORMAL
EOSINOPHIL # BLD AUTO: 0 10E9/L (ref 0–0.7)
EOSINOPHIL NFR BLD AUTO: 0.9 %
ERYTHROCYTE [DISTWIDTH] IN BLOOD BY AUTOMATED COUNT: 19.7 % (ref 10–15)
FOLATE SERPL-MCNC: 26.9 NG/ML
GFR SERPL CREATININE-BSD FRML MDRD: 16 ML/MIN/1.7M2
GLUCOSE BLDC GLUCOMTR-MCNC: 122 MG/DL (ref 70–99)
GLUCOSE BLDC GLUCOMTR-MCNC: 135 MG/DL (ref 70–99)
GLUCOSE BLDC GLUCOMTR-MCNC: 143 MG/DL (ref 70–99)
GLUCOSE BLDC GLUCOMTR-MCNC: 167 MG/DL (ref 70–99)
GLUCOSE SERPL-MCNC: 121 MG/DL (ref 70–99)
HAPTOGLOB SERPL-MCNC: 184 MG/DL (ref 35–175)
HCT VFR BLD AUTO: 21.5 % (ref 40–53)
HGB BLD-MCNC: 7.1 G/DL (ref 13.3–17.7)
IMM GRANULOCYTES # BLD: 0 10E9/L (ref 0–0.4)
IMM GRANULOCYTES NFR BLD: 0.3 %
LDH SERPL L TO P-CCNC: 259 U/L (ref 85–227)
LYMPHOCYTES # BLD AUTO: 0.3 10E9/L (ref 0.8–5.3)
LYMPHOCYTES NFR BLD AUTO: 10.2 %
MAGNESIUM SERPL-MCNC: 1.6 MG/DL (ref 1.6–2.3)
MCH RBC QN AUTO: 31.7 PG (ref 26.5–33)
MCHC RBC AUTO-ENTMCNC: 33 G/DL (ref 31.5–36.5)
MCV RBC AUTO: 96 FL (ref 78–100)
MONOCYTES # BLD AUTO: 0.4 10E9/L (ref 0–1.3)
MONOCYTES NFR BLD AUTO: 13.3 %
NEUTROPHILS # BLD AUTO: 2.4 10E9/L (ref 1.6–8.3)
NEUTROPHILS NFR BLD AUTO: 75.3 %
NRBC # BLD AUTO: 0 10*3/UL
NRBC BLD AUTO-RTO: 0 /100
PF4 HEPARIN CMPLX AB SER QL: NEGATIVE
PHOSPHATE SERPL-MCNC: 2.6 MG/DL (ref 2.5–4.5)
PLATELET # BLD AUTO: 43 10E9/L (ref 150–450)
POTASSIUM SERPL-SCNC: 3.9 MMOL/L (ref 3.4–5.3)
PROT SERPL-MCNC: 4.7 G/DL (ref 6.8–8.8)
RBC # BLD AUTO: 2.24 10E12/L (ref 4.4–5.9)
RETICS # AUTO: 60.3 10E9/L (ref 25–95)
RETICS/RBC NFR AUTO: 2.7 % (ref 0.5–2)
SODIUM SERPL-SCNC: 134 MMOL/L (ref 133–144)
SPECIMEN SOURCE: NORMAL
SPECIMEN SOURCE: NORMAL
TACROLIMUS BLD-MCNC: 7.5 UG/L (ref 5–15)
TME LAST DOSE: NORMAL H
VIT B12 SERPL-MCNC: >6000 PG/ML (ref 193–986)
WBC # BLD AUTO: 3.2 10E9/L (ref 4–11)

## 2018-08-31 PROCEDURE — 40000133 ZZH STATISTIC OT WARD VISIT: Performed by: OCCUPATIONAL THERAPIST

## 2018-08-31 PROCEDURE — 25000131 ZZH RX MED GY IP 250 OP 636 PS 637: Mod: GY | Performed by: INTERNAL MEDICINE

## 2018-08-31 PROCEDURE — 25000132 ZZH RX MED GY IP 250 OP 250 PS 637: Mod: GY | Performed by: COLON & RECTAL SURGERY

## 2018-08-31 PROCEDURE — 82550 ASSAY OF CK (CPK): CPT | Performed by: COLON & RECTAL SURGERY

## 2018-08-31 PROCEDURE — 21400006 ZZH R&B CCU INTERMEDIATE UMMC

## 2018-08-31 PROCEDURE — 97110 THERAPEUTIC EXERCISES: CPT | Mod: GO | Performed by: OCCUPATIONAL THERAPIST

## 2018-08-31 PROCEDURE — 83010 ASSAY OF HAPTOGLOBIN QUANT: CPT | Performed by: NURSE PRACTITIONER

## 2018-08-31 PROCEDURE — 87899 AGENT NOS ASSAY W/OPTIC: CPT | Performed by: INTERNAL MEDICINE

## 2018-08-31 PROCEDURE — 85045 AUTOMATED RETICULOCYTE COUNT: CPT | Performed by: COLON & RECTAL SURGERY

## 2018-08-31 PROCEDURE — 25000132 ZZH RX MED GY IP 250 OP 250 PS 637: Mod: GY | Performed by: NURSE PRACTITIONER

## 2018-08-31 PROCEDURE — A9270 NON-COVERED ITEM OR SERVICE: HCPCS | Mod: GY | Performed by: STUDENT IN AN ORGANIZED HEALTH CARE EDUCATION/TRAINING PROGRAM

## 2018-08-31 PROCEDURE — 40000611 ZZHCL STATISTIC MORPHOLOGY W/INTERP HEMEPATH TC 85060: Performed by: NURSE PRACTITIONER

## 2018-08-31 PROCEDURE — 80076 HEPATIC FUNCTION PANEL: CPT | Performed by: COLON & RECTAL SURGERY

## 2018-08-31 PROCEDURE — 82607 VITAMIN B-12: CPT | Performed by: COLON & RECTAL SURGERY

## 2018-08-31 PROCEDURE — 36415 COLL VENOUS BLD VENIPUNCTURE: CPT | Performed by: NURSE PRACTITIONER

## 2018-08-31 PROCEDURE — 25000132 ZZH RX MED GY IP 250 OP 250 PS 637: Mod: GY | Performed by: STUDENT IN AN ORGANIZED HEALTH CARE EDUCATION/TRAINING PROGRAM

## 2018-08-31 PROCEDURE — 83735 ASSAY OF MAGNESIUM: CPT | Performed by: COLON & RECTAL SURGERY

## 2018-08-31 PROCEDURE — 25000125 ZZHC RX 250: Performed by: NURSE PRACTITIONER

## 2018-08-31 PROCEDURE — A9270 NON-COVERED ITEM OR SERVICE: HCPCS | Mod: GY | Performed by: NURSE PRACTITIONER

## 2018-08-31 PROCEDURE — 25000132 ZZH RX MED GY IP 250 OP 250 PS 637: Mod: GY | Performed by: INTERNAL MEDICINE

## 2018-08-31 PROCEDURE — 87040 BLOOD CULTURE FOR BACTERIA: CPT | Performed by: PHYSICIAN ASSISTANT

## 2018-08-31 PROCEDURE — 86022 PLATELET ANTIBODIES: CPT | Performed by: NURSE PRACTITIONER

## 2018-08-31 PROCEDURE — 82746 ASSAY OF FOLIC ACID SERUM: CPT | Performed by: COLON & RECTAL SURGERY

## 2018-08-31 PROCEDURE — 83615 LACTATE (LD) (LDH) ENZYME: CPT | Performed by: COLON & RECTAL SURGERY

## 2018-08-31 PROCEDURE — 87040 BLOOD CULTURE FOR BACTERIA: CPT | Performed by: NURSE PRACTITIONER

## 2018-08-31 PROCEDURE — 97530 THERAPEUTIC ACTIVITIES: CPT | Mod: GO | Performed by: OCCUPATIONAL THERAPIST

## 2018-08-31 PROCEDURE — 97535 SELF CARE MNGMENT TRAINING: CPT | Mod: GO | Performed by: OCCUPATIONAL THERAPIST

## 2018-08-31 PROCEDURE — A9270 NON-COVERED ITEM OR SERVICE: HCPCS | Mod: GY | Performed by: INTERNAL MEDICINE

## 2018-08-31 PROCEDURE — 99232 SBSQ HOSP IP/OBS MODERATE 35: CPT | Performed by: NURSE PRACTITIONER

## 2018-08-31 PROCEDURE — 85025 COMPLETE CBC W/AUTO DIFF WBC: CPT | Performed by: COLON & RECTAL SURGERY

## 2018-08-31 PROCEDURE — 00000146 ZZHCL STATISTIC GLUCOSE BY METER IP

## 2018-08-31 PROCEDURE — 80197 ASSAY OF TACROLIMUS: CPT | Performed by: NURSE PRACTITIONER

## 2018-08-31 PROCEDURE — 84100 ASSAY OF PHOSPHORUS: CPT | Performed by: COLON & RECTAL SURGERY

## 2018-08-31 PROCEDURE — 80048 BASIC METABOLIC PNL TOTAL CA: CPT | Performed by: COLON & RECTAL SURGERY

## 2018-08-31 PROCEDURE — 36415 COLL VENOUS BLD VENIPUNCTURE: CPT | Performed by: COLON & RECTAL SURGERY

## 2018-08-31 RX ORDER — SIMETHICONE 80 MG
80 TABLET,CHEWABLE ORAL EVERY 6 HOURS PRN
Status: DISCONTINUED | OUTPATIENT
Start: 2018-08-31 | End: 2018-09-11 | Stop reason: HOSPADM

## 2018-08-31 RX ADMIN — HYDRALAZINE HYDROCHLORIDE 75 MG: 50 TABLET ORAL at 07:03

## 2018-08-31 RX ADMIN — INSULIN GLARGINE 20 UNITS: 100 INJECTION, SOLUTION SUBCUTANEOUS at 08:32

## 2018-08-31 RX ADMIN — NYSTATIN 1000000 UNITS: 100000 SUSPENSION ORAL at 22:07

## 2018-08-31 RX ADMIN — ATORVASTATIN CALCIUM 20 MG: 20 TABLET, FILM COATED ORAL at 20:12

## 2018-08-31 RX ADMIN — MYCOPHENOLATE MOFETIL 500 MG: 250 CAPSULE ORAL at 20:12

## 2018-08-31 RX ADMIN — NYSTATIN 1000000 UNITS: 100000 SUSPENSION ORAL at 11:57

## 2018-08-31 RX ADMIN — MYCOPHENOLATE MOFETIL 500 MG: 250 CAPSULE ORAL at 08:32

## 2018-08-31 RX ADMIN — NYSTATIN 1000000 UNITS: 100000 SUSPENSION ORAL at 16:14

## 2018-08-31 RX ADMIN — VANCOMYCIN HYDROCHLORIDE 125 MG: KIT at 11:57

## 2018-08-31 RX ADMIN — PREDNISONE 5 MG: 5 TABLET ORAL at 20:13

## 2018-08-31 RX ADMIN — Medication 50 MG: at 22:07

## 2018-08-31 RX ADMIN — VANCOMYCIN HYDROCHLORIDE 125 MG: KIT at 16:14

## 2018-08-31 RX ADMIN — HYDRALAZINE HYDROCHLORIDE 75 MG: 50 TABLET ORAL at 22:07

## 2018-08-31 RX ADMIN — TACROLIMUS 3 MG: 1 CAPSULE ORAL at 08:32

## 2018-08-31 RX ADMIN — INSULIN ASPART 1 UNITS: 100 INJECTION, SOLUTION INTRAVENOUS; SUBCUTANEOUS at 13:48

## 2018-08-31 RX ADMIN — Medication 2 G: at 12:01

## 2018-08-31 RX ADMIN — NYSTATIN 1000000 UNITS: 100000 SUSPENSION ORAL at 08:32

## 2018-08-31 RX ADMIN — PANTOPRAZOLE SODIUM 40 MG: 40 TABLET, DELAYED RELEASE ORAL at 08:32

## 2018-08-31 RX ADMIN — PREDNISONE 10 MG: 10 TABLET ORAL at 08:32

## 2018-08-31 RX ADMIN — TACROLIMUS 3 MG: 1 CAPSULE ORAL at 17:58

## 2018-08-31 RX ADMIN — VANCOMYCIN HYDROCHLORIDE 125 MG: KIT at 08:32

## 2018-08-31 RX ADMIN — Medication 1 CAPSULE: at 08:32

## 2018-08-31 RX ADMIN — VANCOMYCIN HYDROCHLORIDE 125 MG: KIT at 20:13

## 2018-08-31 ASSESSMENT — ACTIVITIES OF DAILY LIVING (ADL)
ADLS_ACUITY_SCORE: 12

## 2018-08-31 ASSESSMENT — PAIN DESCRIPTION - DESCRIPTORS
DESCRIPTORS: DISCOMFORT
DESCRIPTORS: DISCOMFORT

## 2018-08-31 NOTE — PLAN OF CARE
Problem: Patient Care Overview  Goal: Plan of Care/Patient Progress Review  D: Pt admitted 8/12 with abdominal pain and GIB. PMH: NICM s/p heart txp, ESRD on HD, R internal jugular thrombus, DM II.      I/A: No acute events overnight. A&Ox4. Vital signs stable on RA. Monitor shows sinus rhythm/sinus tachycardia, HR 90's-100's. Dull abdominal pain managed with Ultram. Oliguric. Continues on low fiber diet. Refused 0200 blood sugar check and 0400 VS.. Trending Hgb. Up with assist of 1. Pt requesting to not be disturbed [phone], slept well through the night.     P: DC to TCU when Hgb stable. Continue to monitor. Notify Cards 2 with changes/concerns.

## 2018-08-31 NOTE — PROGRESS NOTES
Nephrology Progress Note  08/31/2018       Murray Nicholson is a 63 year old male with Heart transplant 6/18, ESRD on HD, HTN, Right internal jugular thrombus on Aphixaban, recent admission 7/26-8/6/18  for pseudomonas bacteremia felt to be 2/2 probable prececal colitis and necrotic mouth ulcer, admitted 8/12/18 with abdominal pain, bloody stool and fever, found to have C diff infection, anorectal abscess and pericolonic abscess.        ASSESSMENT AND RECOMMENDATIONS:       ESRD - Etiology of ESRD 2/2 HTN, DM, CRS.   Has chronic OP HD at [facility], TTS schedule, under care of Dr Mcpherson. Right TDC, EDW increasing to 79.5 kg   - Will continue TTS schedule   - No heparin       Volume status: EDW 79.5 kg. Appears mildly hypervolemic with 1+ upper and lower extremity edema and elevated BP's, however RHC on 8/24 with low filling pressures (RA 1). Anuric. Has colostomy.   - Continue pre run weights, standing   - Strict I/O  -  EDW increased to 79.5 kg based on RHC with low filling pressures        Hypervolemic hyponatremia: ESRD and inability to excrete free water        BP: 130-150's  - Per cards, on hydralazine 75 mg tid         Transplant IS regimen: Tac, MMF, Pred      BMD: Ca 7.6, alb 1.8, phos 3.1    GIB: resolved  Thrombocytopenia: worsening  -HIT panel pending, peripheral and CVC cx drawn 8/31; lysis labs neg  - workup per primary team    Anemia: hgb 7.1 with GIB s/p PRBC 8/29; lysis labs neg; retic 2.7%  - will increase epogen to 10,000 units  - Transfusions per primary team           Pericolonic abscess, small perircal abscess: Underwent I/D anorectal abscess on 8/13 and  Sigmoidectomy/end colostomy on 8/14.      C diff: on PO vanc      Lung nodules - New Right lower lobe pulmonary nodules seen on CT this admission. ID recommending close f/u.         Recommendations were communicated to primary team via progress note and phone conversation.    Kristi Armas PA-C  Division of Kidney Disease  Pager [phone]  [phone]          Interval History :   Seen bedside, feeling better and doing rehab, eating well.  Hgb and plt dropping, labs pending; peripheral and CVC cx today  Denies CP, SOB, n/v, chills      Review of Systems:   4 point ROS negative other than as noted above.    Physical Exam:   I/O last 3 completed shifts:  In: 650 [P.O.:650]  Out: 500 [Urine:50; Stool:450]   /90 (BP Location: Left arm)  Pulse 106  Temp 98.3  F (36.8  C) (Oral)  Resp 18  Ht 1.753 m (5' 9\")  Wt 79.5 kg (175 lb 4.3 oz)  SpO2 100%  BMI 25.88 kg/m2     GENERAL APPEARANCE: alert, NAD  EYES: no scleral icterus, pupils equal  Pulmonary: lungs CTA  CV: RRR   - Edema 1+ upper and lower extremity edema  GI: soft, colostomy/stoma   MS: no evidence of inflammation in joints, no muscle tenderness  SKIN: no rash, warm, dry, no cyanosis  NEURO: alert/oriented  ACCESS: Right TDC    Labs:   All labs reviewed by me  Electrolytes/Renal -   Recent Labs   Lab Test  08/31/18   0646  08/30/18   0724  08/29/18   0622   08/27/18   0644   NA  134  131*  133   < >  129*   POTASSIUM  3.9  3.9  4.3   < >  4.7   CHLORIDE  98  97  97   < >  94   CO2  29  24  26   < >  24   BUN  24  47*  40*   < >  56*   CR  3.81*  5.29*  4.09*   < >  4.66*   GLC  121*  111*  214*   < >  185*   TRINI  7.4*  7.6*  7.6*   < >  7.9*   MAG  1.6  1.8  1.9   --   2.3   PHOS  2.6   --   3.1   --   3.1    < > = values in this interval not displayed.       CBC -   Recent Labs   Lab Test  08/31/18   0646  08/30/18   0724  08/29/18   1612  08/29/18   0622   WBC  3.2*  3.8*   --   3.4*   HGB  7.1*  7.3*  7.2*  6.5*   PLT  43*  51*   --   60*       LFTs -   Recent Labs   Lab Test  08/31/18   0646  08/27/18   0644  08/20/18   1105   ALKPHOS  133  133  170*   BILITOTAL  0.5  0.5  0.4   ALT  14  13  18   AST  17  16  41   PROTTOTAL  4.7*  5.2*  6.1*   ALBUMIN  1.6*  1.8*  1.7*       Iron Panel -   Recent Labs   Lab Test  07/10/18   0711  05/08/18   0954  07/19/17   1306   IRON  66  58  46   IRONSAT  " 28  27  18   [facility]  771*  621*  369       Current Medications:    atorvastatin  20 mg Oral QPM     fluticasone  1-2 spray Both Nostrils Daily     hydrALAZINE  75 mg Oral Q8H JEAN     insulin aspart  1-6 Units Subcutaneous TID w/meals     insulin aspart  1-5 Units Subcutaneous At Bedtime     insulin glargine  20 Units Subcutaneous QAM AC     mycophenolate  500 mg Oral BID     NEPHROCAPS  1 capsule Oral Daily     nystatin  1,000,000 Units Swish & Swallow 4x Daily     pantoprazole  40 mg Oral QAM AC     predniSONE  10 mg Oral Daily     predniSONE  5 mg Oral Daily     sodium chloride (PF)  3 mL Intracatheter Q8H     sodium chloride (PF)  3 mL Intracatheter Q8H     tacrolimus  3 mg Oral BID IS     triamcinolone   Topical BID     valGANciclovir  450 mg Oral Once per day on Tue Sat     vancomycin  125 mg Oral 4x Daily       IV fluid REPLACEMENT ONLY       IV fluid REPLACEMENT ONLY       Kristi Armas PA-C

## 2018-08-31 NOTE — PLAN OF CARE
Problem: Patient Care Overview  Goal: Plan of Care/Patient Progress Review  PT 6C: Cancel - Pt declining therapy at time of check-in. Will reschedule per POC.

## 2018-08-31 NOTE — PLAN OF CARE
Problem: Patient Care Overview  Goal: Plan of Care/Patient Progress Review  Discharge Planner OT  6C   Patient plan for discharge: Acute Rehab  Current status: Pt continues to require min encouragement for participation at time of session, pt with great participation once session initiated.  Facilitated ADLs seated at EOB with set up A, including whole body dressing. Limited by fatigue, needing rest breaks in between tasks.  Wc to CR gym where he tolerated 22' on Nustep workload 2 at a slow pace to increase tolerance for IADLs as pt lives alone.   Barriers to return to prior living situation: fatigue, precautions, weakness  Recommendations for discharge:  ARU  Rationale for recommendations: Pt presents with new deficits including weakness, poor activity tolerance, fatigue leading to decreased function and safety. Pt unable to complete longer distance transfers (from EOB to hallway wc) or standing ADLs. Needs to achieve  Mod IND with walker and  stairs to return home where he lives alone and will need to complete all ADLs and IADLs without assist. Pt will benefit from intensive, interdisciplinary ARU to address these deficits and to provide caregiver training to facilitate a safe dc to home.            Entered by: Maliha Kaufman 08/31/2018 11:03 AM

## 2018-08-31 NOTE — PROGRESS NOTES
D: Pt admitted 8/12 with abdominal pain & abscesses s/p colostomy 8/14 c/b ileus and bleeding. Hx of heart transplant in June, ESRD on HD, R jugular thrombus and DM II.     I: Monitored vitals and assessed pt status.   Changed: Ostomy bag emptied   Running: n/a - 2 L PIV SL.  PRN: Ultram q12h    A: A0x4. VSS on RA. Afebrile. SR/ST in the 90s-100s. C/o mild abdominal discomfort r/t gas - pt declined meds. Low fiber diet - fair appetite. Oliguric. Pt up SBA/ind in room and tolerating well. Pt declined walking in halls with writer, writer reinforced importance of ambulating - pt still declined. Mag was 1.6 - replaced IV. K+ was 3.9 - per cards 2 no replacement needed. HIT panel came back negative. Pt to start calorie counts tomorrow. Low fiber diet. Pt resting comfortably in bed.     P: Continue to monitor Pt status and report changes to treatment team - Cards 2.

## 2018-08-31 NOTE — PROGRESS NOTES
"CLINICAL NUTRITION SERVICES - REASSESSMENT NOTE     Nutrition Prescription    RECOMMENDATIONS FOR MDs/PROVIDERS TO ORDER:  None at this time     Malnutrition Status:    Non-severe malnutrition in the context of acute illness/injury on chronic illness    Recommendation ordered by RD:   1. Ordered calorie counts to better assess with pt now receiving two MNT supplements daily.     Future/Additional Recommendations:  1. Continue diet order as per surgery team.   2. Monitor BG control. DM II hx, Endo has followed in the past. Low BG this admission, at times. Hgb A1c of 7 on both 7/18/18 and previously on 4/26/18.   3. Assess if pt needs calcium/vitamin D, if remains on prednisone. Per nephrology post-op Tx, pt was getting calcium during dialysis and calcitriol during runs.   4. Consider checking vitamin B12 status. Last checked in 2015.  5. If TFs are needed, see nutrition note 8/24 for recs. If parenteral nutrition (PN) is needed, see nutrition assessment note 8/13 for central PN recs via a central line, to provide 100% of nutrition needs at goal vs restart recent peripheral PN regimen.      EVALUATION OF THE PROGRESS TOWARD GOALS   Diet: Low fiber diet with Boost plus BID.   -Pt was previously on a Regular diet order from 8/26-8/27.   Intake: Per Flowsheet documentation seems to have varying appetite as pt is consuming % of his documented meals. Per healthtouch pt is ordering 3 meals daily.     Spoke with pt today, he endorses a \"so-so\" appetite. Pt states he is ordering an average of one Boost Plus daily, chocolate. He endorses early satiety and explains that he will eat about 1/2 of his lunch and then sip on the Boost throughout the afternoon. He states that he is eating slowly and chewing his food well. Factors affecting oral intake include diet restrictions, and food choices in the hospital .       Nutrition Support: None at this time: peripheral parenteral nutrition (PPN) from 8/19-8/23, 960 mL/day x 24 " hours with 55 g dextrose (GIR 0.5), 40 g AA (0.5 g protein/kg) + 250 mL 20% IV 5 days/week which provided 704 kcal (9 kcal/kg) and 51% kcal from fat on average which does not provide adequate nutrition intake.    NEW FINDINGS   Meds: nephrocaps 1 capsule daily, prednisone 10 mg daily and 5 mg daily,  beta carotene 25,000 units 2x daily- discontinued on 8/30    Labs: Na+: 8/30: 131 (L), 8/31: 134 (WNL)  Lactate dehydrogenase: 259 (H)  Glucose: Pt's BS have been running between 111-264 mg/dL over the past 5 days    MALNUTRITION   % Intake: < 75% for >/= 7 days (non-severe)  % Weight Loss: Difficult to assess wt changes due to changes in fluid status, pt on dialysis.   Subcutaneous Fat Loss: Facial region:  mild  Muscle Loss: Temporal, Scapular bone and Thoracic region (clavicle, acromium bone, deltoid, trapezius, pectoral): mild, Posterior calf: Moderate  Fluid Accumulation/Edema: Mild  Malnutrition Diagnosis: Non-severe malnutrition in the context of acute illness/injury on chronic illness    Previous Goals   Patient to consume % of nutritionally adequate meal trays TID, or the equivalent with supplements/snacks.  Evaluation: Not Met     Previous Nutrition Diagnosis  Inadequate oral intake related to diet advancement, previous diet restrictions,early satiety, and food choices as evidenced by pt consuming about 50% of meals    Evaluation: No change/Continues    CURRENT NUTRITION DIAGNOSIS  Inadequate oral intake related to diet restrictions,early satiety, and food choices as evidenced by pt consuming % of meals.    INTERVENTIONS  Implementation   1. Nutrition education: Reviewed low fiber edu, and continued to encourage PO intake.     2. Collaboration with other nutrition professionals: made note in healthtouch as pt's has MD from colorectal surgery permission to have oatmeal per diet order entered.  3. Continued to encourage intake, discussed supplement schedule. Pt states that he only likes to drink one  Boost daily, continued to encourage intaking more if eating only 50% of meals to ensure that pt is meeting his minimal energy and protein needs.     4. Calorie counts    Goals  Patient to consume % of nutritionally adequate meal trays TID, or the equivalent with supplements/snacks.    Monitoring/Evaluation  Progress toward goals will be monitored and evaluated per protocol.     Nutrition will continue to follow.     Ragini Lemon, MS, RD, LD   6C pgr: [phone]

## 2018-08-31 NOTE — PLAN OF CARE
Problem: Surgery Nonspecified (Adult)  Goal: Signs and Symptoms of Listed Potential Problems Will be Absent, Minimized or Managed (Surgery Nonspecified)  Signs and symptoms of listed potential problems will be absent, minimized or managed by discharge/transition of care (reference Surgery Nonspecified (Adult) CPG).   Outcome: Improving  D/He is on low fiber diet. HGB is stable the last few checks, naps on and off. Colostomy is CDI (changed earlier today)  A/improving  P/monitor for changes

## 2018-08-31 NOTE — PROGRESS NOTES
[facility]   Cardiology II Service / Advanced Heart Failure  Daily Progress Note      Patient: Murray Nicholson  MRN: [id number]  Admission Date: 8/12/2018  Hospital Day # 19    Assessment and Plan: Murray Nicholson is a 63 year old male with a h/o NICM s/p heart txp (6/14/18), ESRD on HD, R internal jugular thrombus on apixaban and DM2 p/w 3-4 bright red BM and fevers/chills, found to have a daniella-colonic abscess (7.8 cm), daniella-rectal abscess (2.7 cm), probable diverticulitis, and R colon colitis now s/p colostomy.     Today's Plan:  -repeat blood cultures   -eval causes of thrombocytopenia  -discharge to ARU versus TCU when bed available      # Daniella-colonic abscess (7.8 cm) s/p laparoscopic assisted sigmoid colectomy with end colostomy 8/14) positive for VRE  # Daniella-rectal abscess (2.7 cm) s/p drainage 8/12  # R colon colitis  # Positive c. Diff  s/p wcourse cipro, linezolid, flagyl 8/14-8/27. CT enterography 8/30 without abscess, and interval improvement of dilated bowel.   - Continue PO vanc x 10 days after abx d/c'ed (through 9/6)  - Miralax daily per GI, APAP, tramadol PRN pain  - Colorectal, transplant ID following, appreciate assistance  - Low residual diet with supplements  - repeating cx today given low grade fever overnight    # Recent right internal jugular line-associated thrombus  # Acute anemia  # Melena, resolved  Noted to have melena in ostomy, holding heparin gtt since 8/22. Transfuse for Hgb <7. S/p 1 unit pRBC 8/29 for hgb 6.5, likely partially dilutional after IVF, improved now stable low 7s  - Continue to hold heparin gtt and ASA  - Per CRS, no scope due to risk of disruption anastomosis, we prefer to defer (despite drop in hgb) due to leukopenia    # Status post OHT on 6/13/18, history of ICM  # Donor three vessel CAD  Immunosuppression:   --continue prednisone 10 mg in AM and 5 mg in PM then per taper with negative bx  --continue Cellcept 500 mg BID  --continue tacrolimus 3 mg bid (goal  6-8 due to infection), daily troughs - pending today  --DSAs 8/29 negative    Prophylaxis:   --CAV: aspirin, atorvastatin 20 mg daily (cost concern with rosuva)  --Thrush: Nystatin swish and swallow  --PCP: s/p pentamidine 8/17, due 9/14  --GI: Protonix   --CMV: valcyte 450 mg Tues and Sat, renally dosed, ideally 6 mos post-Tx  --Bones: calcium/vitamin D      Serostatus: HSV1-, HSV1+, CMV D+/R-, EBV D?/R+, VZV+    Biopsies: negative 8/24, due 9/7    # Thrombocytopenia  Trending down, all cell count down by anemia can be explained by ESRD and leukopenia has been chronic and can be explained by Cellcept.   - HIT negative  - hemolysis labs per renal  - repeating cultures given low grade fever to r/o sepsis as cause  - ?can be caused by hydralazine in rare cases, will consider changing antiHTN regimen    # Malnutrition  - nutrition following, continue calorie counts     # Recent pseudomonas bacteremia  # Necrotic mouth ulcer, resolving  Had profound neutropenia and pseudomonas bacteremia several weeks ago in associated with necrotic mouth ulcer; neutropenia likely immunosuppression-induced. Neutropenia resolved last admission and pseudomonal bacteremia is likely cleared. Necrotic ulcer appears to be resolving from prior admission. Not currently active issues but will continue to monitor. Low threshold to repeat cultures.      # Pulmonary nodules  Stable nodule but also new RLL nodules 5 mm and 3 mm noted incidentally on CT 8/12 and increased in size on 8/29 CT enterography, will need interval follow up in 4-6 weeks (~9/16).     # ESRD on TTS HD  - Nephrology consulted, appreciate assistance     # HTN  - at goal on hydralazine 75 mg tid (decreased from qid on 8/27), will discuss discontinuing in case contributing to low platelets    # DM2   - Glargine increased 8/24, medium sliding scale insulin  - Monitor, may need adjustment now that taking PO     FEN: Advance as tolerated, low residue  PPx: Holding heparin for now,  "SCDs ordered  Dispo: PT/OT recommending TCU at DC    ================================================================    Subjective/24-Hr Events:   Last 24 hr care team notes reviewed. No complaints or concerns today. Denies feeling feverish or chilled. No nausea, vomiting. Endorses mild lower abdominal pain that was relieved with releasing air from ostomy pouch. No blood in ostomy.Plans to work with therapies today. Looking forward to MO.    ROS:  4 point ROS including respiratory, CV, GI and  (other than that noted in the HPI) is negative.     Medications: Reviewed in EPIC.     Physical Exam:   BP (!) 140/97  Pulse 106  Temp 98.9  F (37.2  C)  Resp 18  Ht 1.753 m (5' 9\")  Wt 79.5 kg (175 lb 4.3 oz)  SpO2 100%  BMI 25.88 kg/m2    GENERAL: Appears comfortable, in no distress, chronically ill.  HEENT: Eye symmetrical, no discharge or icterus bilaterally. Mucous membranes moist and without lesions.  NECK: Supple, JVD not visible at 90 degrees.   CV: Tachycardic but regular, +S1S2, no murmur, rub, or gallop.   RESPIRATORY: Respirations regular, even, and unlabored. Lungs CTA throughout.   GI: Soft and mildly distended with normoactive bowel sounds present in all quadrants. No tenderness, rebound, guarding. Stoma site CDI, output dark green/brown without overt melena.  EXTREMITIES: Trace peripheral edema. 2+ bilateral pedal pulses. Warm.   NEUROLOGIC: Alert and oriented x 3. No focal deficits.   MUSCULOSKELETAL: No joint swelling or tenderness.   SKIN: No jaundice. No rashes or lesions. Ulcer on hard palate still present with yellow sloughing.     Labs:  CMP    Recent Labs  Lab 08/31/18  0646 08/30/18  0724 08/29/18  0622 08/28/18  0710 08/27/18  0644    131* 133 129* 129*   POTASSIUM 3.9 3.9 4.3 5.2 4.7   CHLORIDE 98 97 97 94 94   CO2 29 24 26 25 24   ANIONGAP 7 10 10 10 11   * 111* 214* 142* 185*   BUN 24 47* 40* 66* 56*   CR 3.81* 5.29* 4.09* 5.82* 4.66*   GFRESTIMATED 16* 11* 15* 10* 13* "   GFRESTBLACK 19* 13* 18* 12* 15*   TRINI 7.4* 7.6* 7.6* 7.9* 7.9*   MAG 1.6 1.8 1.9  --  2.3   PHOS 2.6  --  3.1  --  3.1   PROTTOTAL  --   --   --   --  5.2*   ALBUMIN  --   --   --   --  1.8*   BILITOTAL  --   --   --   --  0.5   ALKPHOS  --   --   --   --  133   AST  --   --   --   --  16   ALT  --   --   --   --  13       CBC    Recent Labs  Lab 08/31/18  0646 08/30/18  0724 08/29/18  1612 08/29/18  0622 08/28/18  0710   WBC 3.2* 3.8*  --  3.4* 5.1   RBC 2.24* 2.32*  --  2.05* 2.24*   HGB 7.1* 7.3* 7.2* 6.5* 7.0*   HCT 21.5* 22.3*  --  19.8* 21.5*   MCV 96 96  --  97 96   MCH 31.7 31.5  --  31.7 31.3   MCHC 33.0 32.7  --  32.8 32.6   RDW 19.7* 19.9*  --  19.4* 19.2*   PLT 43* 51*  --  60* 73*       INR    Recent Labs  Lab 08/27/18  0644   INR 1.07       Time/Communication  I personally spent a total of 25 minutes. Of that 15 minutes was counseling/coordination of patient's care. Plan of care discussed with patient. See my note above for details.    Patient discussed with Dr. Austin.      Ramya Suh, DNP, NP-C  Advanced Heart Failure/Cardiology II Service  Pager [phone]

## 2018-08-31 NOTE — PROGRESS NOTES
Transplant Social Work Services Progress Note      Collaborated with: Murray, Cards 2, Rehab admissins    Data: Murray is nearing readiness to dc to rehab. Current recommendation is ARU. SW informed that due to multiple days of dialysis he hasn't participated in therapy. He needs to participate tomorrow and they could maybe accept over the weekend.   Intervention: Met with Murray. Updated medical team.   Assessment: Murray is agreeable to dc to rehab when medically ready. He reports that he is feeling better, but still not well.   Education provided by [facility]: rehab process  Plan:    Discharge Plans in Progress: ARU in 1-2 days    Barriers to d/c plan: [facility]TCU is only option as he received heart transplant about 2 months ago.     Follow up Plan: [facility] will continue to be available as needed.    Pager [phone]

## 2018-08-31 NOTE — PROGRESS NOTES
Colorectal Surgery Progress Note  POD#19    Subjective:  Feeling better, tolerated dialysis better yesterday, ambulating. Tolerating low residue diet with no N/V, no fever or chills. Looking forward to discharge.     Vitals:  Vitals:    08/30/18 1519 08/30/18 1548 08/30/18 2005 08/31/18 0010   BP: 128/82 130/88 122/82 123/80   BP Location: Left arm Left arm Left arm    Pulse:       Resp:  18 18 18   Temp: 99.6  F (37.6  C) 98.8  F (37.1  C) 99.2  F (37.3  C) 98.9  F (37.2  C)   TempSrc: Oral Oral Oral    SpO2: 99% 100% 98% 100%   Weight:       Height:           I/O:  I/O last 3 completed shifts:  In: 650 [P.O.:650]  Out: 1350 [Other:1100; Stool:250]    Physical Exam:  Gen: AAOx3, NAD, lying in bed, conversant  Pulm: Non-labored breathing  Abd: Soft, non-distended, non-tender   Incision C/D/I with dermabond    Stoma pink and viable with greenish semi-solid stool and gas in bag  Ext:  Warm and well-perfused    BMP    Recent Labs  Lab 08/30/18  0724 08/29/18  0622 08/28/18  0710 08/27/18  0644 08/26/18  0653   * 133 129* 129* 130*   POTASSIUM 3.9 4.3 5.2 4.7 4.3   CHLORIDE 97 97 94 94 94   CO2 24 26 25 24 26   BUN 47* 40* 66* 56* 38*   CR 5.29* 4.09* 5.82* 4.66* 3.40*   * 214* 142* 185* 165*   MAG 1.8 1.9  --  2.3 1.9   PHOS  --  3.1  --  3.1  --      CBC    Recent Labs  Lab 08/30/18  0724 08/29/18  1612 08/29/18  0622 08/28/18  0710 08/27/18  0644   WBC 3.8*  --  3.4* 5.1 5.2   HGB 7.3* 7.2* 6.5* 7.0* 7.5*   HCT 22.3*  --  19.8* 21.5* 22.8*   PLT 51*  --  60* 73* 91*         ASSESSMENT: This is a 63 year old male with NICM s/p heart transplant 6/2018 on immunosuppressants, with ESRD on HD, R internal jugular thrombus previously on apixaban, T2DM, hx pseudomonal bacteremia admitted to cards service for painless BRBPR. Found to have diverticulitis with 7.8 cm pericolonic abscess without safe window for IR drain. 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 Post-op course complicated by ileus, and concern for bleeding after initiation of anticoagulation, with slowly down-trending Hgb. Has received transfusion of PRBC x3. Ileus now resolved with return of bowel function. Issues with dizziness seem to be resolving with less fluid removed during dialysis. Appreciate care per primary team.     - Continue low residue diet and encourage supplements as tolerated  - Schedule Miralax daily  - Hgb stable, no indication for endoscopy at this time.     Please call with further questions or concerns. Colorectal Surgery will continue to follow.     Eri Fairchild MD   General Surgery Resident, PGY1  Pager [phone]

## 2018-09-01 LAB
ABO + RH BLD: NORMAL
ABO + RH BLD: NORMAL
ANION GAP SERPL CALCULATED.3IONS-SCNC: 10 MMOL/L (ref 3–14)
BASOPHILS # BLD AUTO: 0 10E9/L (ref 0–0.2)
BASOPHILS NFR BLD AUTO: 0 %
BLD GP AB SCN SERPL QL: NORMAL
BLD PROD TYP BPU: NORMAL
BLD PROD TYP BPU: NORMAL
BLD UNIT ID BPU: 0
BLOOD BANK CMNT PATIENT-IMP: NORMAL
BLOOD PRODUCT CODE: NORMAL
BPU ID: NORMAL
BUN SERPL-MCNC: 30 MG/DL (ref 7–30)
CALCIUM SERPL-MCNC: 7.9 MG/DL (ref 8.5–10.1)
CHLORIDE SERPL-SCNC: 98 MMOL/L (ref 94–109)
CO2 SERPL-SCNC: 27 MMOL/L (ref 20–32)
CREAT SERPL-MCNC: 5.04 MG/DL (ref 0.66–1.25)
CRP SERPL-MCNC: 15 MG/L (ref 0–8)
DIFFERENTIAL METHOD BLD: ABNORMAL
EBV DNA # SPEC NAA+PROBE: NORMAL {COPIES}/ML
EBV DNA SPEC NAA+PROBE-LOG#: NORMAL {LOG_COPIES}/ML
EOSINOPHIL # BLD AUTO: 0.1 10E9/L (ref 0–0.7)
EOSINOPHIL NFR BLD AUTO: 2.1 %
ERYTHROCYTE [DISTWIDTH] IN BLOOD BY AUTOMATED COUNT: 19.5 % (ref 10–15)
GFR SERPL CREATININE-BSD FRML MDRD: 12 ML/MIN/1.7M2
GLUCOSE BLDC GLUCOMTR-MCNC: 126 MG/DL (ref 70–99)
GLUCOSE BLDC GLUCOMTR-MCNC: 202 MG/DL (ref 70–99)
GLUCOSE BLDC GLUCOMTR-MCNC: 218 MG/DL (ref 70–99)
GLUCOSE BLDC GLUCOMTR-MCNC: 233 MG/DL (ref 70–99)
GLUCOSE SERPL-MCNC: 136 MG/DL (ref 70–99)
HCT VFR BLD AUTO: 21.6 % (ref 40–53)
HGB BLD-MCNC: 6.9 G/DL (ref 13.3–17.7)
IMM GRANULOCYTES # BLD: 0 10E9/L (ref 0–0.4)
IMM GRANULOCYTES NFR BLD: 0.3 %
LYMPHOCYTES # BLD AUTO: 0.3 10E9/L (ref 0.8–5.3)
LYMPHOCYTES NFR BLD AUTO: 11.1 %
MCH RBC QN AUTO: 31.2 PG (ref 26.5–33)
MCHC RBC AUTO-ENTMCNC: 31.9 G/DL (ref 31.5–36.5)
MCV RBC AUTO: 98 FL (ref 78–100)
MONOCYTES # BLD AUTO: 0.2 10E9/L (ref 0–1.3)
MONOCYTES NFR BLD AUTO: 7.7 %
NEUTROPHILS # BLD AUTO: 2.3 10E9/L (ref 1.6–8.3)
NEUTROPHILS NFR BLD AUTO: 78.8 %
NRBC # BLD AUTO: 0 10*3/UL
NRBC BLD AUTO-RTO: 1 /100
NUM BPU REQUESTED: 3
PLATELET # BLD AUTO: 46 10E9/L (ref 150–450)
PLATELET # BLD EST: ABNORMAL 10*3/UL
POTASSIUM SERPL-SCNC: 4 MMOL/L (ref 3.4–5.3)
RBC # BLD AUTO: 2.21 10E12/L (ref 4.4–5.9)
SODIUM SERPL-SCNC: 134 MMOL/L (ref 133–144)
SPECIMEN EXP DATE BLD: NORMAL
TRANSFUSION STATUS PATIENT QL: NORMAL
TRANSFUSION STATUS PATIENT QL: NORMAL
WBC # BLD AUTO: 2.9 10E9/L (ref 4–11)

## 2018-09-01 PROCEDURE — 63400005 ZZH RX 634: Performed by: INTERNAL MEDICINE

## 2018-09-01 PROCEDURE — 00000146 ZZHCL STATISTIC GLUCOSE BY METER IP

## 2018-09-01 PROCEDURE — 25000131 ZZH RX MED GY IP 250 OP 636 PS 637: Mod: GY | Performed by: INTERNAL MEDICINE

## 2018-09-01 PROCEDURE — A9270 NON-COVERED ITEM OR SERVICE: HCPCS | Mod: GY | Performed by: NURSE PRACTITIONER

## 2018-09-01 PROCEDURE — 25000132 ZZH RX MED GY IP 250 OP 250 PS 637: Mod: GY | Performed by: INTERNAL MEDICINE

## 2018-09-01 PROCEDURE — A9270 NON-COVERED ITEM OR SERVICE: HCPCS | Mod: GY | Performed by: INTERNAL MEDICINE

## 2018-09-01 PROCEDURE — 85025 COMPLETE CBC W/AUTO DIFF WBC: CPT | Performed by: STUDENT IN AN ORGANIZED HEALTH CARE EDUCATION/TRAINING PROGRAM

## 2018-09-01 PROCEDURE — 36415 COLL VENOUS BLD VENIPUNCTURE: CPT | Performed by: STUDENT IN AN ORGANIZED HEALTH CARE EDUCATION/TRAINING PROGRAM

## 2018-09-01 PROCEDURE — 21400006 ZZH R&B CCU INTERMEDIATE UMMC

## 2018-09-01 PROCEDURE — 25000125 ZZHC RX 250: Performed by: NURSE PRACTITIONER

## 2018-09-01 PROCEDURE — 25000128 H RX IP 250 OP 636: Performed by: INTERNAL MEDICINE

## 2018-09-01 PROCEDURE — 25000132 ZZH RX MED GY IP 250 OP 250 PS 637: Mod: GY | Performed by: NURSE PRACTITIONER

## 2018-09-01 PROCEDURE — 25000132 ZZH RX MED GY IP 250 OP 250 PS 637: Mod: GY | Performed by: STUDENT IN AN ORGANIZED HEALTH CARE EDUCATION/TRAINING PROGRAM

## 2018-09-01 PROCEDURE — 80048 BASIC METABOLIC PNL TOTAL CA: CPT | Performed by: STUDENT IN AN ORGANIZED HEALTH CARE EDUCATION/TRAINING PROGRAM

## 2018-09-01 PROCEDURE — A9270 NON-COVERED ITEM OR SERVICE: HCPCS | Mod: GY | Performed by: STUDENT IN AN ORGANIZED HEALTH CARE EDUCATION/TRAINING PROGRAM

## 2018-09-01 PROCEDURE — 25000132 ZZH RX MED GY IP 250 OP 250 PS 637: Mod: GY | Performed by: COLON & RECTAL SURGERY

## 2018-09-01 PROCEDURE — 99232 SBSQ HOSP IP/OBS MODERATE 35: CPT | Performed by: NURSE PRACTITIONER

## 2018-09-01 PROCEDURE — 86140 C-REACTIVE PROTEIN: CPT | Performed by: STUDENT IN AN ORGANIZED HEALTH CARE EDUCATION/TRAINING PROGRAM

## 2018-09-01 PROCEDURE — P9016 RBC LEUKOCYTES REDUCED: HCPCS | Performed by: INTERNAL MEDICINE

## 2018-09-01 PROCEDURE — 90937 HEMODIALYSIS REPEATED EVAL: CPT

## 2018-09-01 RX ADMIN — VANCOMYCIN HYDROCHLORIDE 125 MG: KIT at 16:06

## 2018-09-01 RX ADMIN — NYSTATIN 1000000 UNITS: 100000 SUSPENSION ORAL at 20:59

## 2018-09-01 RX ADMIN — NYSTATIN 1000000 UNITS: 100000 SUSPENSION ORAL at 13:06

## 2018-09-01 RX ADMIN — Medication: at 07:55

## 2018-09-01 RX ADMIN — PANTOPRAZOLE SODIUM 40 MG: 40 TABLET, DELAYED RELEASE ORAL at 07:20

## 2018-09-01 RX ADMIN — INSULIN GLARGINE 20 UNITS: 100 INJECTION, SOLUTION SUBCUTANEOUS at 07:54

## 2018-09-01 RX ADMIN — VANCOMYCIN HYDROCHLORIDE 125 MG: KIT at 07:21

## 2018-09-01 RX ADMIN — SODIUM CHLORIDE 250 ML: 9 INJECTION, SOLUTION INTRAVENOUS at 07:50

## 2018-09-01 RX ADMIN — TACROLIMUS 3 MG: 1 CAPSULE ORAL at 18:25

## 2018-09-01 RX ADMIN — ATORVASTATIN CALCIUM 20 MG: 20 TABLET, FILM COATED ORAL at 19:57

## 2018-09-01 RX ADMIN — NYSTATIN 1000000 UNITS: 100000 SUSPENSION ORAL at 07:20

## 2018-09-01 RX ADMIN — HYDRALAZINE HYDROCHLORIDE 75 MG: 50 TABLET ORAL at 22:40

## 2018-09-01 RX ADMIN — MYCOPHENOLATE MOFETIL 500 MG: 250 CAPSULE ORAL at 19:57

## 2018-09-01 RX ADMIN — PREDNISONE 5 MG: 5 TABLET ORAL at 19:58

## 2018-09-01 RX ADMIN — Medication 50 MG: at 22:40

## 2018-09-01 RX ADMIN — NYSTATIN 1000000 UNITS: 100000 SUSPENSION ORAL at 16:06

## 2018-09-01 RX ADMIN — HYDRALAZINE HYDROCHLORIDE 75 MG: 50 TABLET ORAL at 15:00

## 2018-09-01 RX ADMIN — MYCOPHENOLATE MOFETIL 500 MG: 250 CAPSULE ORAL at 07:20

## 2018-09-01 RX ADMIN — VANCOMYCIN HYDROCHLORIDE 125 MG: KIT at 13:06

## 2018-09-01 RX ADMIN — EPOETIN ALFA 10000 UNITS: 10000 SOLUTION INTRAVENOUS; SUBCUTANEOUS at 09:18

## 2018-09-01 RX ADMIN — PREDNISONE 10 MG: 10 TABLET ORAL at 07:21

## 2018-09-01 RX ADMIN — SODIUM CHLORIDE 300 ML: 9 INJECTION, SOLUTION INTRAVENOUS at 07:50

## 2018-09-01 RX ADMIN — TACROLIMUS 3 MG: 1 CAPSULE ORAL at 07:20

## 2018-09-01 RX ADMIN — VANCOMYCIN HYDROCHLORIDE 125 MG: KIT at 19:58

## 2018-09-01 RX ADMIN — INSULIN ASPART 2 UNITS: 100 INJECTION, SOLUTION INTRAVENOUS; SUBCUTANEOUS at 14:56

## 2018-09-01 RX ADMIN — INSULIN ASPART 2 UNITS: 100 INJECTION, SOLUTION INTRAVENOUS; SUBCUTANEOUS at 20:59

## 2018-09-01 RX ADMIN — VALGANCICLOVIR 450 MG: 450 TABLET, FILM COATED ORAL at 18:25

## 2018-09-01 ASSESSMENT — PAIN DESCRIPTION - DESCRIPTORS: DESCRIPTORS: CONSTANT;DULL;ACHING

## 2018-09-01 ASSESSMENT — ACTIVITIES OF DAILY LIVING (ADL)
ADLS_ACUITY_SCORE: 12
ADLS_ACUITY_SCORE: 13
ADLS_ACUITY_SCORE: 13
ADLS_ACUITY_SCORE: 12

## 2018-09-01 NOTE — PROGRESS NOTES
[facility]  Transplant Infectious Disease Progress Note     Patient:  Murray Nicholson, Date of birth 1955, Medical record number [id number]  Date of Visit:  09/01/2018         Assessment and Recommendations:   Recommendations:  - Would not resume antibiotics at this time in the absence of overt / marked fever, markedly increased abdominal symptoms, and new abdominal CT findings suggestive of a new infectious focus (with none seen on the 8/30/18 CT scan).  - Would send serial serum CRPs every other day x 3 as additional early-warning monitoring for any emergent difficulty.  - Await the pending Histoplasma urine Ag assay (for pulmonary nodule that has increased in size).  - The source of his anemia and relative leukopenia does not seem likely to be due to bacterial infection at this time.    - Continue treatment for C difficile with PO vancomycin for 10 days following cessation of broad-spectrum coverage (i.e., through 9/6)  - Continue valganciclovir at prophylaxis dosing until 3 months post transplant (9/14/18).  - Continue pentamidine for PJP prophylaxis.  If he has stable counts moving forward, would be reasonable to re-assess TMP-SMX.    Case discussed with the Colorectal Surgery service.  Transplant ID will follow infrequently with you.    Vinayak Lawrence MD  Pager [phone]     Assessment:   A 63-year-old gentleman with history of ischemic/valvular cardiomyopathy s/p OHT on 6/14/18 (induction with solumedrol and maintenance with tacrolimus, MMF, and prednisone), ESRD on TThSa hemodialysis currently listed for kidney transplantation, history of polymicrobial peritonitis with Candida glabrata when previously on peritoneal dialysis in 1/2018, recent Pseudomonas aeruginosa bacteremia on 7/26/18 with retained dialysis catheter, hypertension, hyperlipidemia, and type 2 diabetes who was admitted on 8/12 with subjective fevers, severe lower abdominal pain, and bloody stools and now ow C  diff positive.  He is s/p laparoscopic abscess drainage, sigmoidectomy, and partial small bowel resection on 8/14, with polymicrobial operative cultures growing Citrobacter freundii complex, VRE, Klebsiella pneumoniae, pseudomonas aeruginosa and Veillonella.  He continues to make progress, remains afebrile, some bloody stools in the colostomy bag.  Teams working w/ GI in re-assessing this issue.      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  Throughout this time period, he had been on broad-spectrum coverage with cefepime, which should have suppressed many enteric organisms, though with holes in anaerobic coverage, that could explain progression of symptoms. Underlying lesions seems likely to be related to diverticulosis.  Taken to the OR on 8/14/18 for laparoscopic drainage of pericolonic and perirectal abscesses, as well as sigmoidectomy, end colostomy, and partial small bowel resection. Fluid cultures obtained in the OR (8/14/18)  Grew  Citrobacter freundii complex, VRE, Klebsiella pneumoniae, pseudomonas aeruginosa and Veillonella. Patient has remained afebrile in post-op period, with subjective improvement in abdominal pain.  He completed 2 weeks of broad-spectrum antibiotics (timed from OR) on 8/27.  Question whether small bowel enhancement seen on CT related to known small bowel resection.      # C diff positive diarrhea:  Commenced therapy with oral vancomycin on 8/13/18 and planning to continue for 10 days after completion of broad-spectrum course for intraabdominal infection.     #  Pseudomonas aeruginosa bacteremia  associated with a hemodialysis catheter that was retained: History of positive blood cultures from 7/26 but by report, there have not been more recent positive cultures since then.  Had long course of antibiotics active against Pseudomonas (cefepime through 8/23, then cipro through 8/27).  There is still a risk that he may have recrudescence of bacteremia as result of biofilm formation on the line that was retained so repeat cultures with any fevers.     # New small (3mm and 5mm) pulmonary nodules: 3mm nodule increased in size to 6mm on 8/29 CT enterography.  Serum CrAg and urine histo ag pending.  Recommend follow up in pulmonary nodule clinic.    # Oral ulcer: Viral testing during last admission was negative.  Wound culture grew Pseudomonas in addition to oral tiffany. Lesion appears to have decreased in size since earlier in hospitalization.     Other ID issues:  - Viral serostatus & prophylaxis: HSV1-, HSV2+, CMV D+/R-, EBV D?/R+, VZV+.  Ganciclovir until tolerating PO.  - PJP: TMP-SMX  - Isolation status: Enteric for C difficile.        Interval History:   Mr. Nicholson continues to generally feel good today, although is customarily quite fatigued with dialysis and after runs, although otherwise tolerates hemodialysis (including this AM).  He is ambulating short distances.  He remains consistently afebrile since his broad-spectrum antibiotics were discontinued on 8/28/18, but he has had two temperatures in the 99-degree range since then, most recently 99.6 degrees on 8/30/18 midday.  He lacks feverishness, chills or sweats.  T max is 98.6 degrees over the past 24 hours.  He remains on post-transplant maintenance Valcyte and an oral vancomycin course tail for C difficile (through 9/6/18).  His peripheral WBC has decreased to 2.9 and his hemoglobin is lower than expected at 6.9 for uncertain reasons -- will receive pRBCs today.  His abdominal ache continues to slowly resolve since his 8/14/18 surgery.  He is eating  "fairly well without nausea although his appetite is still a bit weak and he finds some types of solid foods do not go down very well.  He has appropriate ostomy output and the stool is looking a bit more formed and \"normal\" (darker) in color in the past couple of days.  He lacks EENT symptoms, rash, cough, dyspnea, chest pain, headache, or other new complaints today.  Repeat abdominal CT scan on 8/29/18 showed no new likely infectious foci, although the RLL pulmonary nodule had increased to 6 mm in size (versus 8/18/18).  Repeat plasma CMV PCR was undetectable on 8/30/18.  An 8/31/18 serum CRAG was negative.  A urinary Histoplasma antigen assay is still pending.    Transplants:  6/14/2018 (Heart), Postoperative day:  79.  Coordinator Britni Mcknight    Immunosuppression: Mycophenolate 500 mg twice daily, tacrolimus 3mg BID (Goal 6-8 due to infection).  Prednisone 10/5 mg         Current Medications & Allergies:       atorvastatin  20 mg Oral QPM     fluticasone  1-2 spray Both Nostrils Daily     gelatin absorbable  1 each Topical During Hemodialysis (from stock)     hydrALAZINE  75 mg Oral Q8H JEAN     insulin aspart  1-6 Units Subcutaneous TID w/meals     insulin aspart  1-5 Units Subcutaneous At Bedtime     insulin glargine  20 Units Subcutaneous QAM AC     mycophenolate  500 mg Oral BID     NEPHROCAPS  1 capsule Oral Daily     nystatin  1,000,000 Units Swish & Swallow 4x Daily     pantoprazole  40 mg Oral QAM AC     predniSONE  10 mg Oral Daily     predniSONE  5 mg Oral Daily     sodium chloride (PF)  3 mL Intracatheter During Hemodialysis (from stock)     sodium chloride (PF)  3 mL Intracatheter During Hemodialysis (from stock)     sodium chloride (PF)  3 mL Intracatheter Q8H     sodium chloride (PF)  3 mL Intracatheter Q8H     tacrolimus  3 mg Oral BID IS     triamcinolone   Topical BID     valGANciclovir  450 mg Oral Once per day on Tue Sat     vancomycin  125 mg Oral 4x Daily "     Infusions/Drips:    IV fluid REPLACEMENT ONLY       IV fluid REPLACEMENT ONLY       Allergies   Allergen Reactions     Norco [Hydrocodone-Acetaminophen] Nausea and Vomiting     Cats      Throat tightness     Isosorbide Other (See Comments)     hypotension     Penicillins Hives     Seasonal Allergies      rhinitis     Shrimp      Throat closes           Review of Systems:   As per Interval History.  Complete ROS is otherwise negative.         Physical Exam:   Vitals were reviewed and are stable.  Vital sign ranges over the past 24 hours:  Temp:  [98  F (36.7  C)-98.6  F (37  C)] 98.2  F (36.8  C)  Heart Rate:  [] 96  Resp:  [16-18] 18  BP: (102-160)/() 132/86  Cuff Mean (mmHg):  [112-118] 115  SpO2:  [98 %-100 %] 100 %    Physical Examination:  GENERAL: A pleasant, conversant, WDWN 62 yo man in NAD.  EYES:  EOMI, anicteric sclerae  ENT:  No otorrhea.  Oral ulcer on hard palate, improving.  NECK:  Supple.  LUNGS:  Clear to auscultation bilaterally  CARDIOVASCULAR:  Regular rate and rhythm with no murmur.  Well-healed surgical scars.    ABDOMEN: Increased bowel sounds.  Periumbilical and infraumbilical incisional mild tenderness persists, otherwise nontender superiorly.   LLQ colostomy with clean stoma and darker, normal-colored, semi-formed stool.  :  No Dobbins.   SKIN:  No acute rash or lesion.  NEUROLOGIC:  Alert, oriented x3, moves extremities x 4.         Laboratory Data:     CD4 counts  No results found for: ACD4  Inflammatory Markers    Recent Labs   Lab Test  08/17/18   0644  08/14/18   0620  08/12/18   0230  07/26/18   1732  07/02/18   0601  06/30/18   0852  07/05/17   1204  05/31/17   1111   01/13/16   1337   SED   --    --   72*   --    --    --    --    --    --   80*   CRP  270.0*  230.0*  76.0*  270.0*  7.3  12.0*  35.4*  15.9*   < >   --     < > = values in this interval not displayed.     Immune Globulin Studies    Recent Labs   Lab Test  05/19/15   1000   IGG  1380   IGM  108   IGA   203     Metabolic Studies       Recent Labs   Lab Test  09/01/18   0632  08/31/18   0646  08/30/18   0724  08/29/18   0622  08/28/18   0710  08/27/18   0644  08/26/18   0653  08/25/18   0821  08/24/18   0634   08/22/18   0741  08/21/18   0633   NA  134  134  131*  133  129*  129*  130*  125*  127*   < >  128*  128*   POTASSIUM  4.0  3.9  3.9  4.3  5.2  4.7  4.3  3.9  3.9   < >  3.3*  3.8   CHLORIDE  98  98  97  97  94  94  94  90*  91*   < >  93*  94   CO2  27  29  24  26  25  24  26  26  24   < >  24  23   ANIONGAP  10  7  10  10  10  11  11  9  11   < >  10  10   BUN  30  24  47*  40*  66*  56*  38*  59*  49*   < >  46*  45*   CR  5.04*  3.81*  5.29*  4.09*  5.82*  4.66*  3.40*  4.30*  3.36*   < >  3.22*  3.66*   GFRESTIMATED  12*  16*  11*  15*  10*  13*  18*  14*  19*   < >  20*  17*   GLC  136*  121*  111*  214*  142*  185*  165*  217*  202*   < >  256*  294*   TRINI  7.9*  7.4*  7.6*  7.6*  7.9*  7.9*  8.0*  7.6*  7.9*   < >  7.3*  7.8*   PHOS   --   2.6   --   3.1   --   3.1   --    --   3.6   --   2.7  2.9   MAG   --   1.6  1.8  1.9   --   2.3  1.9  1.7  1.8   < >  1.8  2.2    < > = values in this interval not displayed.     Hepatic Studies    Recent Labs   Lab Test  08/31/18   0646  08/27/18   0644  08/20/18   1105  08/14/18   0620  08/13/18   0618  08/08/18   0921  07/29/18   0523   BILITOTAL  0.5  0.5  0.4  0.3  0.3  0.4  0.4   ALKPHOS  133  133  170*  110  99  156*  106   ALBUMIN  1.6*  1.8*  1.7*  1.6*  1.6*   --   1.9*   AST  17  16  41  29  28  34  26   ALT  14  13  18  17  17  22  21     Hematology Studies      Recent Labs   Lab Test  09/01/18   0632  08/31/18   0646  08/30/18   0724  08/29/18   1612  08/29/18   0622  08/28/18   0710  08/27/18   0644   WBC  2.9*  3.2*  3.8*   --   3.4*  5.1  5.2   ANEU  2.3  2.4  2.9   --   2.6  4.2  4.3   ALYM  0.3*  0.3*  0.4*   --   0.3*  0.3*  0.5*   DK  0.2  0.4  0.5   --   0.4  0.6  0.3   AEOS  0.1  0.0  0.1   --   0.0  0.0  0.0   HGB  6.9*  7.1*  7.3*  7.2*   6.5*  7.0*  7.5*   HCT  21.6*  21.5*  22.3*   --   19.8*  21.5*  22.8*   MCV  98  96  96   --   97  96  95   PLT  46*  43*  51*   --   60*  73*  91*     Clotting Studies    Recent Labs   Lab Test  08/27/18   0644  08/20/18   1105  08/12/18   0834  08/12/18   0230   06/29/18   0545  06/28/18   0554  06/27/18   0629   INR  1.07  0.99  1.31*  1.25*   < >   --   1.15*  1.10   PTT   --    --   37   --    --   30  29  29    < > = values in this interval not displayed.     Pathology   8/14/18:   A. SMALL BOWEL WITH FISTULA, RESECTION:   - Small bowel wall with fistulous tract lined by inflamed granulation  tissue   - Margins viable   B. SIGMOID COLON, SIGMOIDECTOMY:   - Colonic wall with diverticulosis and associated perforations lined by  acute and chronic inflammation,  foreign body giant cell reaction and granulation tissue formation   - Margins viable     Microbiology  Abdominal abscess fluid    8/14 Light growth of citrobacter freundii complex, VRE, Klebsiella pneumoniae, and Pseudomonas aeruginosa. Veillonella, mixed tiffany    Blood                         6/14/18 dialysis catheter tip 2 strains CoNS                         7/26/18 x2 from [facility] Pseudomonas aeruginosa (Microbiology report obtained from [employer]\"" and copy placed in paper chart.  Pan-sensitive).                         7/26/18 x2 [facility] negative                         7/28/18 x2 negative                         8/10/18 ×2 negative                         8/12/18 ×3 negative     Wound                         7/26/18: Mouth wound with pansensitive pseudomonas aeruginosa and normal tiffany                         8/12/18 Mouth ulcer     Peritoneal fluid                         1/7/18 13 yellow fluid with 4736 WBCs, 80% neutrophils.  Gram stain with no organisms and many WBCs.  Culture with Candida glabrata and Bacteroides caccae.                         1/15/18: Peritoneal fluid with 4256 WBCs, 91% neutrophils.  Gram stain with many WBCs and no organisms seen.   Culture with Staphylococcus epidermidis, Candida glabrata    Enteric  Enteric LOGAN   18 negative  Giardia ag   18 negative  C diff   18 positive  Cryptosporidium ag   18 negative  Microsporidium ag   18 negative    Cryptococcus Ag negative 18     Virology  CMV blood PCR                         18 negative                         18 negative  EBV blood PCR                         18 negative  Parvovirus blood PCR                          18 negative  VZV blood PCR                         18 negative     Imagin/29 CT enterography  1. Redemonstration of the enhancing lesion in the left lower quadrant small bowel measuring up to 1.4 cm. This may represent a small polyp or GIST.  2. Interval improvement in the previously seen dilation of the small bowel. Stable postoperative changes of partial sigmoidectomy and left lower quadrant colostomy.  3. Increased size of a 6 mm right lower lobe pulmonary nodule, which previously measured 3 mm. The relatively short period of time and significant increase in size favors an infectious or inflammatory etiology, however this is not definitive. Additional sub-5 mm pulmonary nodules are unchanged. Recommend three-month follow-up CT.  4. Multiple unchanged intraductal pancreatic mucinous neoplasms.  5. Decreased small left pleural effusion.

## 2018-09-01 NOTE — PROGRESS NOTES
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, TTS schedule, under care of Dr Mcpherson. Right TDC, EDW increasing to 79.5 kg   - Will continue TTS schedule   - No heparin   - list for kidney transplant with wait time dating from 3/24/16 - currently inactive as too ill to undergo kidney transplantation surgery      Volume status: EDW 79.5 kg. Thought he has edema, his RHC on 8/24 showed low filling pressures (RA 1).    - Continue pre run weights, standing   - Strict I/O  -  EDW 79.5 kg based on RHC from 8/24/18       Hyponatremia -due to excess water in anuric patient, sodium hovering at 134, limit water intake        Hypertension: 130-150's  Could consider ACEi though has side effect of anemia and not ideal in this patient with current epo resistance and ongoing requirement of PRBC.    - Per cards, on hydralazine 75 mg tid         Transplant IS regimen: Tac, MMF, Pred      BMD: Ca 7.6, alb 1.8, phos 3.1    GIB: resolved  Thrombocytopenia: worsening  -HIT panel pending, peripheral and CVC cx drawn 8/31; lysis labs neg  - workup per primary team    Anemia: hemoglobin 6.9 with ongoing transfusion needs.  This is out of proportion to what is typically seen in ESRD and he is now on max dose of epogen.  - continue epogen to 10,000 units  - transfuse pRBC per primary team           Pericolonic abscess, small perircal abscess: Underwent I/D anorectal abscess on 8/13 and  Sigmoidectomy/end colostomy on 8/14.      C  "diff: on PO vanc      Lung nodules - New Right lower lobe pulmonary nodules seen on CT this admission. ID recommending close f/u.         Recommendations were communicated to primary team via progress note    Ana Luisa Mcpherson MD  [facility]  Department of Medicine  Division of Renal Disease and Hypertension  [phone]           Interval History :   Seen on dialysis, no issues during run.  He still makes about 100 ml urine every morning and did so already today.  Has improved appetite, a little more strength in legs compared to the last 1-2 weeks.  Hemoglobin continues to drop and he is getting pRBC today.  Very fatigued after HD and has a hard time with physical therapy in the hospital.      Review of Systems:   4 point ROS negative other than as noted above.    Physical Exam:   I/O last 3 completed shifts:  In: 480 [P.O.:480]  Out: 250 [Urine:50; Stool:200]   /75  Pulse 106  Temp 98.2  F (36.8  C)  Resp 18  Ht 1.753 m (5' 9\")  Wt 80.4 kg (177 lb 4 oz)  SpO2 99%  BMI 26.18 kg/m2     GENERAL APPEARANCE: alert, NAD  EYES: no scleral icterus, pupils equal  Pulmonary: lungs CTA  CV: RRR   - Edema 1+ upper and lower extremity edema  GI: soft, colostomy/stoma   MS: no evidence of inflammation in joints, no muscle tenderness  SKIN: no rash, warm, dry, no cyanosis  NEURO: alert/oriented  ACCESS: Right TDC    Labs:   All labs reviewed by me  Electrolytes/Renal -   Recent Labs   Lab Test  09/01/18   0632  08/31/18   0646  08/30/18   0724  08/29/18   0622   08/27/18   0644   NA  134  134  131*  133   < >  129*   POTASSIUM  4.0  3.9  3.9  4.3   < >  4.7   CHLORIDE  98  98  97  97   < >  94   CO2  27  29  24  26   < >  24   BUN  30  24  47*  40*   < >  56*   CR  5.04*  3.81*  5.29*  4.09*   < >  4.66*   GLC  136*  121*  111*  214*   < >  185*   TRINI  7.9*  7.4*  7.6*  7.6*   < >  7.9*   MAG   --   1.6  1.8  1.9   --   2.3   PHOS   --   2.6   --   3.1   --   3.1    < > = values in this " interval not displayed.       CBC -   Recent Labs   Lab Test  09/01/18   0632  08/31/18   0646  08/30/18   0724   WBC  2.9*  3.2*  3.8*   HGB  6.9*  7.1*  7.3*   PLT  46*  43*  51*       LFTs -   Recent Labs   Lab Test  08/31/18   0646  08/27/18   0644  08/20/18   1105   ALKPHOS  133  133  170*   BILITOTAL  0.5  0.5  0.4   ALT  14  13  18   AST  17  16  41   PROTTOTAL  4.7*  5.2*  6.1*   ALBUMIN  1.6*  1.8*  1.7*       Iron Panel -   Recent Labs   Lab Test  07/10/18   0711  05/08/18   0954  07/19/17   1306   IRON  66  58  46   IRONSAT  28  27  18   ALBERTINA  771*  621*  369       Current Medications:    atorvastatin  20 mg Oral QPM     fluticasone  1-2 spray Both Nostrils Daily     gelatin absorbable  1 each Topical During Hemodialysis (from stock)     hydrALAZINE  75 mg Oral Q8H JEAN     insulin aspart  1-6 Units Subcutaneous TID w/meals     insulin aspart  1-5 Units Subcutaneous At Bedtime     insulin glargine  20 Units Subcutaneous QAM AC     mycophenolate  500 mg Oral BID     NEPHROCAPS  1 capsule Oral Daily     nystatin  1,000,000 Units Swish & Swallow 4x Daily     pantoprazole  40 mg Oral QAM AC     predniSONE  10 mg Oral Daily     predniSONE  5 mg Oral Daily     sodium chloride (PF)  3 mL Intracatheter During Hemodialysis (from stock)     sodium chloride (PF)  3 mL Intracatheter During Hemodialysis (from stock)     sodium chloride (PF)  3 mL Intracatheter Q8H     sodium chloride (PF)  3 mL Intracatheter Q8H     tacrolimus  3 mg Oral BID IS     triamcinolone   Topical BID     valGANciclovir  450 mg Oral Once per day on Tue Sat     vancomycin  125 mg Oral 4x Daily       IV fluid REPLACEMENT ONLY       IV fluid REPLACEMENT ONLY       Ana Luisa Mcpherson MD

## 2018-09-01 NOTE — PROGRESS NOTES
D: Pt with PMH of NICM s/p heart txp (6/14/18), ESRD on HD, R internal jugular thrombus on apixaban and DM2 p/w 3-4 bright red BM and fevers/chills, found to have a daniella-colonic abscess (7.8 cm), daniella-rectal abscess (2.7 cm), probable diverticulitis, and R colon colitis now s/p colostomy.   A/I: Pt is A&Ox4. Pt is able to make needs known and uses call light appropriately. Pt VSS on RA. Heart rhythm: SR/ST rates 90's-100's. Pt is up with ind/SBA. Pt on HD and is Oliguric. No urine overnight. Ostomy bag emptied at HS, small amount of stool and gas in bag. Pt reports lower abdominal pain and received Tramadol PO PRN at HS. Access: 2 L PIV SL. Low fiber diet. Pt to start calorie counts today.  P: Dialysis today. RN will continue to monitor and assess. RN will update team with any changes/concerns. Chelle Lane    0645: Critical lab value: Hemoglobin 6.9. RN notified team. RN will continue to monitor and assess. RN will update team with any changes/concerns. Chelle Lane

## 2018-09-01 NOTE — PROGRESS NOTES
HEMODIALYSIS TREATMENT NOTE    Date: 9/1/2018  Time: 11:42 AM    Data:  Pre Wt: 80.4 kg (175 lb 11.3 oz)   Desired Wt: 79.5 kg   Post Wt: 79.5 kg (175 lb 11.3 oz)  Weight gain: -.9 kg   Weight change: .9kg  Ultrafiltration - Post Run Net Total Removed (mL): 900 mL  Ultrafiltration - Post Run Net Total Gain (mL): 0 mL  Vascular Access Status: Yes, secured and visible  Dialyzer Rinse: Light  Total Blood Volume Processed: 68.9  Total Dialysis (Treatment) Time: 3.5hrs     Lab:   no    Interventions:Assessments  Pt dialyzed today for 3.5hrs on a K-3 Ca-3 bath via RCVC. 350 Qb throughout. 900mls of UF. 1PRBC on HD. VSS throughout. Ate breakfast. See MAR for meds given. See Epic for more details. Report to primary RN Edith.     Plan:    Per renal

## 2018-09-01 NOTE — PROGRESS NOTES
[facility]   Cardiology II Service / Advanced Heart Failure  Daily Progress Note      Patient: Murray Nicholson  MRN: [id number]  Admission Date: 8/12/2018  Hospital Day # 20    Assessment and Plan: Murray Nicholson is a 63 year old male with a h/o NICM s/p heart txp (6/14/18), ESRD on HD, R internal jugular thrombus on apixaban and DM2 p/w 3-4 bright red BM and fevers/chills, found to have a daniella-colonic abscess (7.8 cm), daniella-rectal abscess (2.7 cm), probable diverticulitis, and R colon colitis now s/p colostomy.     Today's Plan:  -f/u repeat blood cultures   -d/w transplant ID today, no need to resume abx but recommend following CRP  -discharge to ARU versus TCU when labs stable     # Daniella-colonic abscess (7.8 cm) s/p laparoscopic assisted sigmoid colectomy with end colostomy 8/14) positive for VRE  # Daniella-rectal abscess (2.7 cm) s/p drainage 8/12  # R colon colitis  # Positive c. Diff  s/p course cipro, linezolid, flagyl 8/14-8/27. CT enterography 8/30 without abscess, and interval improvement of dilated bowel.   - Continue PO vanc x 10 days after abx d/c'ed (through 9/6)  - Miralax daily per GI, APAP, tramadol PRN pain  - Colorectal, transplant ID following, appreciate assistance  - Low residual diet with supplements  - repeated blood cx 8/31 given low grade fevers, NGTD, trend CRPs per ID    # Recent right internal jugular line-associated thrombus  # Acute anemia  # Melena, resolved  Noted to have melena in ostomy, holding heparin gtt and ASA since 8/22. S/p 1 unit pRBC 8/29 for hgb 6.5, likely partially dilutional after IVF, and 1 unit 9/1 for hgb 6.9   - Continue to hold heparin gtt and ASA  - High threshold to transfuse given potential future renal Tx/sensitization   - Per CRS, no scope due to risk of disruption anastomosis, we prefer to defer (despite drop in hgb) due to leukopenia    # Status post OHT on 6/13/18, history of ICM  # Donor three vessel CAD  Immunosuppression:   --continue prednisone  "10 mg in AM and 5 mg in PM then per taper with negative bx  --continue Cellcept 500 mg BID  --continue tacrolimus 3 mg bid (goal 6-8 due to infection), daily troughs given recent gtt - pending today  --DSAs 8/29 negative    Prophylaxis:   --CAV: aspirin, atorvastatin 20 mg daily (cost concern with rosuva)  --Thrush: Nystatin swish and swallow  --PCP: s/p pentamidine 8/17, due 9/14  --GI: Protonix   --CMV: valcyte 450 mg Tues and Sat, renally dosed, ideally 6 mos post-Tx  --Bones: calcium/vitamin D      Serostatus: HSV1-, HSV1+, CMV D+/R-, EBV D?/R+, VZV+    Biopsies: negative 8/24, due 9/7    # Thrombocytopenia  Trending down, all cell count down by anemia can be explained by ESRD and leukopenia has been chronic and can be explained by Cellcept.   - HIT negative  - hemolysis labs negative  - repeated cultures given low grade fever to r/o sepsis as cause  - ?can be caused by hydralazine in rare cases, consider changing antiHTN regimen if persists    # Malnutrition  - nutrition following, continue calorie counts     # Recent pseudomonas bacteremia  # Necrotic mouth ulcer, resolving  Had profound neutropenia and pseudomonas bacteremia several weeks ago in associated with necrotic mouth ulcer; neutropenia likely immunosuppression-induced. Neutropenia resolved last admission and pseudomonal bacteremia is likely cleared. Necrotic ulcer appears to be resolving from prior admission. Not currently active issues but will continue to monitor. Low threshold to repeat cultures.      # Pulmonary nodules  Stable nodule but also new RLL nodules 5 mm and 3 mm noted incidentally on CT 8/12 and increased in size on 8/29 CT enterography  - follow up chest CT in 4-6 weeks (~9/16)  - awaiting Histoplasma urine Ag assay     # ESRD on TTS HD he is not currently listed for transplant as of 8/29 due to being \"too sick\".  - Nephrology consulted, appreciate assistance     # HTN  - at goal on hydralazine 75 mg tid, consider changing if " "thrombocytopenia persists    # DM2   - Glargine increased 8/24, medium sliding scale insulin  - Monitor, may need adjustment now that taking PO     FEN: Advance as tolerated, low residue  PPx: Holding heparin for now, SCDs ordered  Dispo: PT/OT recommending TCU at DC    ================================================================    Subjective/24-Hr Events:   Last 24 hr care team notes reviewed. No complaints or concerns today. Denies feeling feverish or chilled. No nausea, vomiting. Stool output is more brown, no blood.     ROS:  4 point ROS including respiratory, CV, GI and  (other than that noted in the HPI) is negative.     Medications: Reviewed in EPIC.     Physical Exam:   BP (!) 145/98 (BP Location: Left arm)  Pulse 106  Temp 98.2  F (36.8  C) (Oral)  Resp 18  Ht 1.753 m (5' 9\")  Wt 79.5 kg (175 lb 4.3 oz)  SpO2 100%  BMI 25.88 kg/m2    GENERAL: Appears comfortable, in no distress, chronically ill.  HEENT: Eye symmetrical, no discharge or icterus bilaterally. Mucous membranes moist and without lesions.  NECK: Supple, JVD just above clavicle at 90 degrees.   CV: Tachycardic but regular, +S1S2, no murmur, rub, or gallop.   RESPIRATORY: Respirations regular, even, and unlabored. Lungs CTA throughout.   GI: Soft and mildly distended with normoactive bowel sounds present in all quadrants. No tenderness, rebound, guarding. Stoma site CDI, output dark green/brown without overt melena.  EXTREMITIES: Trace peripheral edema. 2+ bilateral pedal pulses. Warm.   NEUROLOGIC: Alert and oriented x 3. No focal deficits.   MUSCULOSKELETAL: No joint swelling or tenderness.   SKIN: No jaundice. No rashes or lesions. Ulcer on hard palate still present with yellow sloughing.     Labs:  CMP    Recent Labs  Lab 08/31/18  0646 08/30/18  0724 08/29/18  0622 08/28/18  0710 08/27/18  0644    131* 133 129* 129*   POTASSIUM 3.9 3.9 4.3 5.2 4.7   CHLORIDE 98 97 97 94 94   CO2 29 24 26 25 24   ANIONGAP 7 10 10 10 11   GLC " 121* 111* 214* 142* 185*   BUN 24 47* 40* 66* 56*   CR 3.81* 5.29* 4.09* 5.82* 4.66*   GFRESTIMATED 16* 11* 15* 10* 13*   GFRESTBLACK 19* 13* 18* 12* 15*   TRINI 7.4* 7.6* 7.6* 7.9* 7.9*   MAG 1.6 1.8 1.9  --  2.3   PHOS 2.6  --  3.1  --  3.1   PROTTOTAL 4.7*  --   --   --  5.2*   ALBUMIN 1.6*  --   --   --  1.8*   BILITOTAL 0.5  --   --   --  0.5   ALKPHOS 133  --   --   --  133   AST 17  --   --   --  16   ALT 14  --   --   --  13       CBC    Recent Labs  Lab 09/01/18  0632 08/31/18  0646 08/30/18  0724 08/29/18  1612 08/29/18  0622   WBC 2.9* 3.2* 3.8*  --  3.4*   RBC 2.21* 2.24* 2.32*  --  2.05*   HGB 6.9* 7.1* 7.3* 7.2* 6.5*   HCT 21.6* 21.5* 22.3*  --  19.8*   MCV 98 96 96  --  97   MCH 31.2 31.7 31.5  --  31.7   MCHC 31.9 33.0 32.7  --  32.8   RDW 19.5* 19.7* 19.9*  --  19.4*   PLT 46* 43* 51*  --  60*       INR    Recent Labs  Lab 08/27/18  0644   INR 1.07       Time/Communication  I personally spent a total of 25 minutes. Of that 15 minutes was counseling/coordination of patient's care. Plan of care discussed with patient. See my note above for details.    Patient discussed with Dr. Austin.      Ramya Suh, DNP, NP-C  Advanced Heart Failure/Cardiology II Service  Pager [phone]

## 2018-09-01 NOTE — PROGRESS NOTES
Colorectal Surgery Progress Note  POD#20    Subjective:  No acute events overnight. Tolerating PO medications and LRD with no N/V. No fevers, chills, pain well controlled. Ambulating more, looking toward discharge.      Vitals:  Vitals:    08/31/18 1607 08/31/18 2030 08/31/18 2205 09/01/18 0400   BP: 143/90 143/88 (!) 142/99    BP Location: Left arm Left arm Left arm    Pulse:       Resp: 18 18 18 18   Temp: 98.3  F (36.8  C) 98.3  F (36.8  C) 98.6  F (37  C)    TempSrc: Oral Oral Oral    SpO2: 100% 100% 100%    Weight:       Height:           I/O:  I/O last 3 completed shifts:  In: 480 [P.O.:480]  Out: 250 [Urine:50; Stool:200]    Physical Exam:  Gen: AAOx3, NAD, lying in bed, seen in dialysis  Pulm: Non-labored breathing  Abd: Soft, non-distended, non-tender   Incision C/D/I with dermabond    Stoma pink and viable with greenish semi-solid stool and gas in bag   Buttock I/D site healing well  Ext:  Warm and well-perfused    BMP    Recent Labs  Lab 08/31/18  0646 08/30/18  0724 08/29/18  0622 08/28/18  0710 08/27/18  0644    131* 133 129* 129*   POTASSIUM 3.9 3.9 4.3 5.2 4.7   CHLORIDE 98 97 97 94 94   CO2 29 24 26 25 24   BUN 24 47* 40* 66* 56*   CR 3.81* 5.29* 4.09* 5.82* 4.66*   * 111* 214* 142* 185*   MAG 1.6 1.8 1.9  --  2.3   PHOS 2.6  --  3.1  --  3.1     CBC    Recent Labs  Lab 08/31/18  0646 08/30/18  0724 08/29/18  1612 08/29/18  0622 08/28/18  0710   WBC 3.2* 3.8*  --  3.4* 5.1   HGB 7.1* 7.3* 7.2* 6.5* 7.0*   HCT 21.5* 22.3*  --  19.8* 21.5*   PLT 43* 51*  --  60* 73*         ASSESSMENT: This is a 63 year old male with NICM s/p heart transplant 6/2018 on immunosuppressants, with ESRD on HD, R internal jugular thrombus previously on apixaban, T2DM, hx pseudomonal bacteremia admitted to cards service for painless BRBPR. Found to have diverticulitis with 7.8 cm pericolonic abscess without safe window for IR drain. 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   Post-op course complicated by ileus, and concern for bleeding after initiation of anticoagulation, with slowly down-trending Hgb. Has received transfusion of PRBC x3 since 8/22, Hgb now stable. Ileus resolved with return of bowel function. Issues with dizziness resolving with less fluid removed during dialysis. Tolerating low residue diet, taking PO pain medications, anticipating discharge when bed available. Appreciate care per primary team.     - Continue low residue diet and encourage supplements as tolerated  - Continue Miralax daily  - Hgb stable, no indication for endoscopy or further imaging at this time. Holding anticoagulation, management per primary team.   - Will follow up outpatient with Dr. Tran     Please call with further questions or concerns. Colorectal Surgery will continue to follow.     Eri Fairchild MD   General Surgery Resident, PGY1  Pager [phone]    Chart reviewed.  I agree with the assessment and plan as outlined.  I did not personally see the patient.  Russel Baron MD  Professor of Surgery

## 2018-09-01 NOTE — PROGRESS NOTES
D: Pt admitted 8/12 with abscesses + diverticulitis + R colon colitis s/p colostomy 8/14. Hx of heart transplant in June, ESRD on HD, DM II, and R jugular thrombus.     I: Monitored vitals and assessed pt status.   Changed: n/a   Running: n/a - R PIV SL  PRN: Tramadol q12h    A: A0x4. VSS on RA. Afebrile. SR/ST 90s-100s. Denies pain. Needs assistance with emptying ostomy - declined emptying this shift - ostomy not full. Dialysis this am and tolerated well. Fair appetite. Low fiber diet. Pt on calorie count until 9/3. Oliguric. Critical Hgb of 6.9 this am and received 1 unit of PRBCs. Up SBA + walker and ambulated in halls - tolerated fairly well.     P: Continue to monitor Pt status and report changes to treatment team - Cards 2.

## 2018-09-02 LAB
ANION GAP SERPL CALCULATED.3IONS-SCNC: 9 MMOL/L (ref 3–14)
BASOPHILS # BLD AUTO: 0 10E9/L (ref 0–0.2)
BASOPHILS NFR BLD AUTO: 0 %
BUN SERPL-MCNC: 17 MG/DL (ref 7–30)
CALCIUM SERPL-MCNC: 8 MG/DL (ref 8.5–10.1)
CHLORIDE SERPL-SCNC: 100 MMOL/L (ref 94–109)
CO2 SERPL-SCNC: 28 MMOL/L (ref 20–32)
CREAT SERPL-MCNC: 3.59 MG/DL (ref 0.66–1.25)
DIFFERENTIAL METHOD BLD: ABNORMAL
EOSINOPHIL # BLD AUTO: 0.1 10E9/L (ref 0–0.7)
EOSINOPHIL NFR BLD AUTO: 2.8 %
ERYTHROCYTE [DISTWIDTH] IN BLOOD BY AUTOMATED COUNT: 20.2 % (ref 10–15)
GFR SERPL CREATININE-BSD FRML MDRD: 17 ML/MIN/1.7M2
GLUCOSE BLDC GLUCOMTR-MCNC: 163 MG/DL (ref 70–99)
GLUCOSE BLDC GLUCOMTR-MCNC: 173 MG/DL (ref 70–99)
GLUCOSE BLDC GLUCOMTR-MCNC: 196 MG/DL (ref 70–99)
GLUCOSE SERPL-MCNC: 112 MG/DL (ref 70–99)
HCT VFR BLD AUTO: 25.1 % (ref 40–53)
HGB BLD-MCNC: 8 G/DL (ref 13.3–17.7)
IMM GRANULOCYTES # BLD: 0 10E9/L (ref 0–0.4)
IMM GRANULOCYTES NFR BLD: 0 %
LACTATE BLD-SCNC: 1.3 MMOL/L (ref 0.7–2)
LACTATE BLD-SCNC: 2.3 MMOL/L (ref 0.7–2)
LYMPHOCYTES # BLD AUTO: 0.4 10E9/L (ref 0.8–5.3)
LYMPHOCYTES NFR BLD AUTO: 13.9 %
MCH RBC QN AUTO: 31.1 PG (ref 26.5–33)
MCHC RBC AUTO-ENTMCNC: 31.9 G/DL (ref 31.5–36.5)
MCV RBC AUTO: 98 FL (ref 78–100)
MONOCYTES # BLD AUTO: 0.2 10E9/L (ref 0–1.3)
MONOCYTES NFR BLD AUTO: 6 %
NEUTROPHILS # BLD AUTO: 2.4 10E9/L (ref 1.6–8.3)
NEUTROPHILS NFR BLD AUTO: 77.3 %
NRBC # BLD AUTO: 0 10*3/UL
NRBC BLD AUTO-RTO: 1 /100
PLATELET # BLD AUTO: 61 10E9/L (ref 150–450)
POTASSIUM SERPL-SCNC: 3.8 MMOL/L (ref 3.4–5.3)
RBC # BLD AUTO: 2.57 10E12/L (ref 4.4–5.9)
SODIUM SERPL-SCNC: 137 MMOL/L (ref 133–144)
TACROLIMUS BLD-MCNC: 10.6 UG/L (ref 5–15)
TME LAST DOSE: NORMAL H
WBC # BLD AUTO: 3.2 10E9/L (ref 4–11)

## 2018-09-02 PROCEDURE — 25000128 H RX IP 250 OP 636

## 2018-09-02 PROCEDURE — 25000125 ZZHC RX 250: Performed by: NURSE PRACTITIONER

## 2018-09-02 PROCEDURE — 36415 COLL VENOUS BLD VENIPUNCTURE: CPT | Performed by: INTERNAL MEDICINE

## 2018-09-02 PROCEDURE — A9270 NON-COVERED ITEM OR SERVICE: HCPCS | Mod: GY | Performed by: NURSE PRACTITIONER

## 2018-09-02 PROCEDURE — 25000131 ZZH RX MED GY IP 250 OP 636 PS 637: Mod: GY | Performed by: STUDENT IN AN ORGANIZED HEALTH CARE EDUCATION/TRAINING PROGRAM

## 2018-09-02 PROCEDURE — 85025 COMPLETE CBC W/AUTO DIFF WBC: CPT | Performed by: STUDENT IN AN ORGANIZED HEALTH CARE EDUCATION/TRAINING PROGRAM

## 2018-09-02 PROCEDURE — 25000132 ZZH RX MED GY IP 250 OP 250 PS 637: Mod: GY | Performed by: INTERNAL MEDICINE

## 2018-09-02 PROCEDURE — 25000132 ZZH RX MED GY IP 250 OP 250 PS 637: Mod: GY | Performed by: COLON & RECTAL SURGERY

## 2018-09-02 PROCEDURE — 00000146 ZZHCL STATISTIC GLUCOSE BY METER IP

## 2018-09-02 PROCEDURE — 80197 ASSAY OF TACROLIMUS: CPT | Performed by: NURSE PRACTITIONER

## 2018-09-02 PROCEDURE — 99232 SBSQ HOSP IP/OBS MODERATE 35: CPT | Performed by: NURSE PRACTITIONER

## 2018-09-02 PROCEDURE — 83605 ASSAY OF LACTIC ACID: CPT | Performed by: INTERNAL MEDICINE

## 2018-09-02 PROCEDURE — 36415 COLL VENOUS BLD VENIPUNCTURE: CPT | Performed by: STUDENT IN AN ORGANIZED HEALTH CARE EDUCATION/TRAINING PROGRAM

## 2018-09-02 PROCEDURE — 25000131 ZZH RX MED GY IP 250 OP 636 PS 637: Mod: GY | Performed by: INTERNAL MEDICINE

## 2018-09-02 PROCEDURE — 25000128 H RX IP 250 OP 636: Performed by: INTERNAL MEDICINE

## 2018-09-02 PROCEDURE — 21400006 ZZH R&B CCU INTERMEDIATE UMMC

## 2018-09-02 PROCEDURE — 25000132 ZZH RX MED GY IP 250 OP 250 PS 637: Mod: GY | Performed by: STUDENT IN AN ORGANIZED HEALTH CARE EDUCATION/TRAINING PROGRAM

## 2018-09-02 PROCEDURE — A9270 NON-COVERED ITEM OR SERVICE: HCPCS | Mod: GY | Performed by: INTERNAL MEDICINE

## 2018-09-02 PROCEDURE — 25000132 ZZH RX MED GY IP 250 OP 250 PS 637: Mod: GY | Performed by: NURSE PRACTITIONER

## 2018-09-02 PROCEDURE — A9270 NON-COVERED ITEM OR SERVICE: HCPCS | Mod: GY | Performed by: STUDENT IN AN ORGANIZED HEALTH CARE EDUCATION/TRAINING PROGRAM

## 2018-09-02 PROCEDURE — 87040 BLOOD CULTURE FOR BACTERIA: CPT | Performed by: INTERNAL MEDICINE

## 2018-09-02 PROCEDURE — 80048 BASIC METABOLIC PNL TOTAL CA: CPT | Performed by: STUDENT IN AN ORGANIZED HEALTH CARE EDUCATION/TRAINING PROGRAM

## 2018-09-02 RX ORDER — SODIUM CHLORIDE, SODIUM LACTATE, POTASSIUM CHLORIDE, CALCIUM CHLORIDE 600; 310; 30; 20 MG/100ML; MG/100ML; MG/100ML; MG/100ML
INJECTION, SOLUTION INTRAVENOUS
Status: COMPLETED
Start: 2018-09-02 | End: 2018-09-02

## 2018-09-02 RX ADMIN — HYDRALAZINE HYDROCHLORIDE 75 MG: 50 TABLET ORAL at 16:18

## 2018-09-02 RX ADMIN — MYCOPHENOLATE MOFETIL 500 MG: 250 CAPSULE ORAL at 09:18

## 2018-09-02 RX ADMIN — INSULIN ASPART 1 UNITS: 100 INJECTION, SOLUTION INTRAVENOUS; SUBCUTANEOUS at 14:14

## 2018-09-02 RX ADMIN — HYDRALAZINE HYDROCHLORIDE 75 MG: 50 TABLET ORAL at 20:28

## 2018-09-02 RX ADMIN — ACETAMINOPHEN 1000 MG: 500 TABLET, FILM COATED ORAL at 09:17

## 2018-09-02 RX ADMIN — TACROLIMUS 3 MG: 1 CAPSULE ORAL at 18:17

## 2018-09-02 RX ADMIN — HYDRALAZINE HYDROCHLORIDE 75 MG: 50 TABLET ORAL at 06:48

## 2018-09-02 RX ADMIN — NYSTATIN 1000000 UNITS: 100000 SUSPENSION ORAL at 12:32

## 2018-09-02 RX ADMIN — TACROLIMUS 3 MG: 1 CAPSULE ORAL at 09:18

## 2018-09-02 RX ADMIN — VANCOMYCIN HYDROCHLORIDE 125 MG: KIT at 12:38

## 2018-09-02 RX ADMIN — PREDNISONE 5 MG: 5 TABLET ORAL at 20:28

## 2018-09-02 RX ADMIN — Medication 50 MG: at 22:32

## 2018-09-02 RX ADMIN — PANTOPRAZOLE SODIUM 40 MG: 40 TABLET, DELAYED RELEASE ORAL at 09:19

## 2018-09-02 RX ADMIN — Medication 1 CAPSULE: at 09:18

## 2018-09-02 RX ADMIN — PREDNISONE 10 MG: 10 TABLET ORAL at 09:18

## 2018-09-02 RX ADMIN — NYSTATIN 1000000 UNITS: 100000 SUSPENSION ORAL at 16:18

## 2018-09-02 RX ADMIN — ATORVASTATIN CALCIUM 20 MG: 20 TABLET, FILM COATED ORAL at 20:28

## 2018-09-02 RX ADMIN — VANCOMYCIN HYDROCHLORIDE 125 MG: KIT at 16:18

## 2018-09-02 RX ADMIN — VANCOMYCIN HYDROCHLORIDE 125 MG: KIT at 09:19

## 2018-09-02 RX ADMIN — MYCOPHENOLATE MOFETIL 500 MG: 250 CAPSULE ORAL at 20:28

## 2018-09-02 RX ADMIN — HYDRALAZINE HYDROCHLORIDE 75 MG: 50 TABLET ORAL at 12:32

## 2018-09-02 RX ADMIN — SODIUM CHLORIDE, POTASSIUM CHLORIDE, SODIUM LACTATE AND CALCIUM CHLORIDE 500 ML: 600; 310; 30; 20 INJECTION, SOLUTION INTRAVENOUS at 19:24

## 2018-09-02 RX ADMIN — NYSTATIN 1000000 UNITS: 100000 SUSPENSION ORAL at 20:28

## 2018-09-02 RX ADMIN — VANCOMYCIN HYDROCHLORIDE 125 MG: KIT at 20:28

## 2018-09-02 RX ADMIN — INSULIN GLARGINE 20 UNITS: 100 INJECTION, SOLUTION SUBCUTANEOUS at 10:43

## 2018-09-02 RX ADMIN — NYSTATIN 1000000 UNITS: 100000 SUSPENSION ORAL at 09:19

## 2018-09-02 RX ADMIN — SODIUM CHLORIDE, POTASSIUM CHLORIDE, SODIUM LACTATE AND CALCIUM CHLORIDE 500 ML: 600; 310; 30; 20 INJECTION, SOLUTION INTRAVENOUS at 19:23

## 2018-09-02 RX ADMIN — INSULIN ASPART 2 UNITS: 100 INJECTION, SOLUTION INTRAVENOUS; SUBCUTANEOUS at 19:21

## 2018-09-02 ASSESSMENT — ACTIVITIES OF DAILY LIVING (ADL)
ADLS_ACUITY_SCORE: 13
ADLS_ACUITY_SCORE: 12
ADLS_ACUITY_SCORE: 12
ADLS_ACUITY_SCORE: 13

## 2018-09-02 NOTE — PROGRESS NOTES
[facility]   Cardiology II Service / Advanced Heart Failure  Daily Progress Note      Patient: Murray Nicholson  MRN: [id number]  Admission Date: 8/12/2018  Hospital Day # 21    Assessment and Plan: Murray Nicholson is a 63 year old male with a h/o NICM s/p heart txp (6/14/18), ESRD on HD, R internal jugular thrombus on apixaban and DM2 p/w 3-4 bright red BM and fevers/chills, found to have a daniella-colonic abscess (7.8 cm), daniella-rectal abscess (2.7 cm), probable diverticulitis, and R colon colitis now s/p colostomy.     Today's Plan:  -f/u blood cultures from 8/31  -trend CRP  -repeat tac level in AM, no dose change today  -discharge to ARU versus TCU when labs stable, hopefully Tuesday     # Daniella-colonic abscess (7.8 cm) s/p laparoscopic assisted sigmoid colectomy with end colostomy 8/14) positive for VRE  # Daniella-rectal abscess (2.7 cm) s/p drainage 8/12  # R colon colitis  # Positive c. Diff  s/p course cipro, linezolid, flagyl 8/14-8/27. CT enterography 8/30 without abscess, and interval improvement of dilated bowel.   - Continue PO vanc x 10 days after abx d/c'ed (through 9/6)  - Miralax daily per GI, APAP, tramadol PRN pain  - Colorectal, transplant ID following, appreciate assistance  - Low residual diet with supplements  - repeated blood cx 8/31 given low grade fevers, NGTD, trend CRPs per ID    # Recent right internal jugular line-associated thrombus  # Acute on chronic anemia  # Melena, resolved  Noted to have melena in ostomy, holding heparin gtt and ASA since 8/22. S/p 1 unit pRBC 8/29 for hgb 6.5, likely partially dilutional after IVF, and 1 unit 9/1 for hgb 6.9 now stable.   - Continue to hold heparin gtt/NOAC/ASA  - High threshold to transfuse given potential future renal Tx/sensitization   - Per CRS, no scope due to risk of disruption anastomosis, we prefer to defer (despite drop in hgb) due to leukopenia    # Status post OHT on 6/13/18, history of ICM  # Donor three vessel  CAD  Immunosuppression:   --continue prednisone 10 mg in AM and 5 mg in PM then per taper with negative bx  --continue Cellcept 500 mg BID  --continue tacrolimus 3 mg bid (goal 6-8 due to infection), daily troughs given recent gtt, above goal today, elected to recheck in AM before dose change  --DSAs 8/29 negative    Prophylaxis:   --CAV: aspirin, atorvastatin 20 mg daily (cost concern with rosuva)  --Thrush: Nystatin swish and swallow  --PCP: s/p pentamidine 8/17, due 9/14  --GI: Protonix   --CMV: valcyte 450 mg Tues and Sat, renally dosed, ideally 6 mos post-Tx  --Bones: calcium/vitamin D      Serostatus: HSV1-, HSV1+, CMV D+/R-, EBV D?/R+, VZV+    Biopsies: negative 8/24, due 9/7    # Thrombocytopenia, improving  # Pancytopenia  Starting 8/28 all cell count down - but anemia can be explained by ESRD and leukopenia has been chronic and can be explained by Cellcept (CMV negative).  - HIT negative, hemolysis labs negative  - repeated cultures given low grade fever to r/o sepsis as cause, NGTD  - ?can be caused by hydralazine in rare cases, consider changing antiHTN regimen if persists    # Malnutrition  - nutrition following, continue calorie counts     # Recent pseudomonas bacteremia  # Necrotic mouth ulcer, resolving  Had profound neutropenia and pseudomonas bacteremia several weeks ago in associated with necrotic mouth ulcer; neutropenia likely immunosuppression-induced. Neutropenia resolved last admission and pseudomonal bacteremia is likely cleared. Necrotic ulcer appears to be resolving from prior admission. Not currently active issues but will continue to monitor. Low threshold to repeat cultures.      # Pulmonary nodules  Stable nodule but also new RLL nodules 5 mm and 3 mm noted incidentally on CT 8/12 and increased in size on 8/29 CT enterography.  - follow up chest CT in 4-6 weeks (~9/16)  - awaiting Histoplasma urine Ag assay, reordered today     # ESRD on TTS HD inactive for transplant as of 8/29 due to  "being \"too sick\". Per renal, needs to rehab and better nutritional status to be active.   - Nephrology following, appreciate assistance     # HTN  - elevated more overnight, increase hydralazine back to 75 mg qid and hold AM of dialysis, consider changing if thrombocytopenia persists    # DM2   - Glargine increased 8/24, medium sliding scale insulin  - Monitor, may need adjustment now that taking PO     FEN: Low residue  PPx: Holding heparin for now, SCDs ordered  Dispo: TCU, hopefully Tuesday    ================================================================    Subjective/24-Hr Events:   Last 24 hr care team notes reviewed. No complaints or concerns today. Denies feeling feverish, chilled, nausea, vomiting, increased abdominal pain. Agrees to work with therapies today.     ROS:  4 point ROS including respiratory, CV, GI and  (other than that noted in the HPI) is negative.     Medications: Reviewed in EPIC.     Physical Exam:   BP (!) 157/115 (BP Location: Left arm)  Pulse 106  Temp 98.5  F (36.9  C) (Oral)  Resp 18  Ht 1.753 m (5' 9\")  Wt 79.7 kg (175 lb 11.3 oz)  SpO2 100%  BMI 25.95 kg/m2    GENERAL: Appears comfortable, in no distress, chronically ill.  HEENT: Eye symmetrical, no discharge or icterus bilaterally. Mucous membranes moist and without lesions.  NECK: Supple, JVD just above clavicle at 90 degrees.   CV: Tachycardic but regular, +S1S2, no murmur, rub, or gallop.   RESPIRATORY: Respirations regular, even, and unlabored. Lungs CTA throughout.   GI: Soft and mildly distended with normoactive bowel sounds present in all quadrants. No tenderness, rebound, guarding. Stoma site CDI, output brown formed stool without overt melena.  EXTREMITIES: Trace peripheral edema. 2+ bilateral pedal pulses. Warm.   NEUROLOGIC: Alert and oriented x 3. No focal deficits.   MUSCULOSKELETAL: No joint swelling or tenderness.   SKIN: No jaundice. No rashes or lesions. Ulcer on hard palate still present with yellow " [place]wan.     Labs:  CMP    Recent Labs  Lab 09/02/18  0634 09/01/18  0632 08/31/18  0646 08/30/18  0724 08/29/18  0622  08/27/18  0644    134 134 131* 133  < > 129*   POTASSIUM 3.8 4.0 3.9 3.9 4.3  < > 4.7   CHLORIDE 100 98 98 97 97  < > 94   CO2 28 27 29 24 26  < > 24   ANIONGAP 9 10 7 10 10  < > 11   * 136* 121* 111* 214*  < > 185*   BUN 17 30 24 47* 40*  < > 56*   CR 3.59* 5.04* 3.81* 5.29* 4.09*  < > 4.66*   GFRESTIMATED 17* 12* 16* 11* 15*  < > 13*   GFRESTBLACK 21* 14* 19* 13* 18*  < > 15*   TRINI 8.0* 7.9* 7.4* 7.6* 7.6*  < > 7.9*   MAG  --   --  1.6 1.8 1.9  --  2.3   PHOS  --   --  2.6  --  3.1  --  3.1   PROTTOTAL  --   --  4.7*  --   --   --  5.2*   ALBUMIN  --   --  1.6*  --   --   --  1.8*   BILITOTAL  --   --  0.5  --   --   --  0.5   ALKPHOS  --   --  133  --   --   --  133   AST  --   --  17  --   --   --  16   ALT  --   --  14  --   --   --  13   < > = values in this interval not displayed.    CBC    Recent Labs  Lab 09/02/18  0634 09/01/18  0632 08/31/18  0646 08/30/18  0724   WBC 3.2* 2.9* 3.2* 3.8*   RBC 2.57* 2.21* 2.24* 2.32*   HGB 8.0* 6.9* 7.1* 7.3*   HCT 25.1* 21.6* 21.5* 22.3*   MCV 98 98 96 96   MCH 31.1 31.2 31.7 31.5   MCHC 31.9 31.9 33.0 32.7   RDW 20.2* 19.5* 19.7* 19.9*   PLT 61* 46* 43* 51*       INR    Recent Labs  Lab 08/27/18  0644   INR 1.07       Time/Communication  I personally spent a total of 25 minutes. Of that 15 minutes was counseling/coordination of patient's care. Plan of care discussed with patient. See my note above for details.    Patient discussed with Dr. Austin.      Ramya Suh, FRANK, NP-C  Advanced Heart Failure/Cardiology II Service  Pager [phone]

## 2018-09-02 NOTE — PROGRESS NOTES
[facility], [place]    Sepsis Evaluation Progress Note    Date of Service: 09/02/2018    I was called to see Murray Nicholson due to abnormal vital signs triggering the Sepsis SIRS screening alert. He is not known to have an infection.     Physical Exam    Vital Signs:  Temp: 98.2  F (36.8  C) Temp src: Oral BP: (!) 135/93   Heart Rate: 103 Resp: 18 SpO2: 100 % O2 Device: None (Room air)      Lab:  Lactic Acid   Date Value Ref Range Status   08/18/2018 0.7 0.7 - 2.0 mmol/L Final     Lactate for Sepsis Protocol   Date Value Ref Range Status   09/02/2018 2.3 (H) 0.7 - 2.0 mmol/L Final     Comment:     Significant value called to and read back by  EMILIE PEARSON RN 6C [id number] 9/2/2018 BY FINA         The patient is at baseline mental status.    The patient has an elevated HR due to his heart transplant and he was slightly overdiuresed     Assessment and Plan    The SIRS and exam findings are likely due to   sepsis.     ID: The patient is currently on the following antibiotics:  Anti-infectives (Future)    Start     Dose/Rate Route Frequency Ordered Stop    08/25/18 1800  valGANciclovir (VALCYTE) tablet 450 mg      450 mg Oral Once per day on Tue Sat 08/24/18 1428      08/13/18 0800  vancomycin (FIRVANQ) oral solution 125 mg      125 mg Oral 4 TIMES DAILY 08/13/18 0714 09/06/18 0669        Current antibiotic coverage no antibiotics are indicated at this time as there are no clinical signs of infection.    Fluid: Fluid bolus ordered.    Lab: Repeat lactic acid ordered for 2 hours from now.    Disposition: The patient will remain on the current unit. We will continue to monitor this patient closely.    Angelito Fountain MD

## 2018-09-02 NOTE — PROVIDER NOTIFICATION
DATE:  9/2/2018   TIME OF RECEIPT FROM LAB:  1723  LAB TEST:  Lactic acid  LAB VALUE:  2.3  RESULTS GIVEN WITH READ-BACK TO (PROVIDER):  Bedside RN Loyda notified, RN contacting Cards 2 team   TIME LAB VALUE REPORTED TO PROVIDER:  --     Addendum:  Cards 2, , notified of results

## 2018-09-02 NOTE — PROGRESS NOTES
Colorectal Surgery Progress Note  POD#21    Subjective: Yesterday received 1 U PRBC in dialysis for Hgb 6.9. No acute events overnight. Tolerating PO medications and LRD with no N/V. No fevers, chills, pain well controlled. Ambulating more, looking toward discharge.      Vitals:  Vitals:    09/01/18 2238 09/01/18 2308 09/02/18 0650 09/02/18 0723   BP: (!) 160/104 (!) 161/104 (!) 157/115    BP Location: Left arm Left arm Left arm    Pulse:       Resp: 18  18    Temp: 98.5  F (36.9  C)      TempSrc: Oral      SpO2: 100%      Weight:    79.7 kg (175 lb 11.3 oz)   Height:           I/O:  I/O last 3 completed shifts:  In: 240 [P.O.:240]  Out: 1450 [Urine:50; Other:1000; Stool:400]    Physical Exam:  Gen: AAOx3, NAD, lying in bed, seen in dialysis  Pulm: Non-labored breathing  Abd: Soft, non-distended, non-tender   Incision C/D/I with dermabond    Stoma pink and viable with greenish semi-solid stool and gas in bag  Ext:  Warm and well-perfused    BMP    Recent Labs  Lab 09/02/18  0634 09/01/18  0632 08/31/18  0646 08/30/18  0724 08/29/18  0622  08/27/18  0644    134 134 131* 133  < > 129*   POTASSIUM 3.8 4.0 3.9 3.9 4.3  < > 4.7   CHLORIDE 100 98 98 97 97  < > 94   CO2 28 27 29 24 26  < > 24   BUN 17 30 24 47* 40*  < > 56*   CR 3.59* 5.04* 3.81* 5.29* 4.09*  < > 4.66*   * 136* 121* 111* 214*  < > 185*   MAG  --   --  1.6 1.8 1.9  --  2.3   PHOS  --   --  2.6  --  3.1  --  3.1   < > = values in this interval not displayed.  CBC    Recent Labs  Lab 09/02/18  0634 09/01/18  0632 08/31/18  0646 08/30/18  0724   WBC 3.2* 2.9* 3.2* 3.8*   HGB 8.0* 6.9* 7.1* 7.3*   HCT 25.1* 21.6* 21.5* 22.3*   PLT 61* 46* 43* 51*         ASSESSMENT: This is a 63 year old male with NICM s/p heart transplant 6/2018 on immunosuppressants, with ESRD on HD, R internal jugular thrombus previously on apixaban, T2DM, hx pseudomonal bacteremia admitted to cards service for painless BRBPR. Found to have diverticulitis with 7.8 cm pericolonic  abscess without safe window for IR drain. 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   Post-op course complicated by ileus, and concern for bleeding after initiation of anticoagulation, with slowly down-trending Hgb. Has received transfusion of PRBC x4 since 8/22, Hgb now stable. Ileus resolved with return of bowel function. Issues with dizziness resolving with less fluid removed during dialysis. Tolerating low residue diet, taking PO pain medications, anticipating discharge when bed available. Appreciate care per primary team.     - Continue low residue diet and encourage supplements as tolerated  - Continue Miralax daily  - Hgb stable after 1 unit(s) PRBC, no indication for endoscopy or further imaging at this time. Holding anticoagulation, management per primary team. On epogen  - Will follow up outpatient with Dr. Tran     Please call with further questions or concerns. Colorectal Surgery will continue to follow.     Eri Fairchild MD   General Surgery Resident, PGY1  Pager [phone]

## 2018-09-02 NOTE — PROGRESS NOTES
Calorie Counts  Intake recorded for: 9/1                  Kcals: 803                    Protein: 24g  # Meals Recorded: 2 meals (First- 100% cream of wheat, coffee, 75% 1 pancake, peaches)       (Second- 75% mashed potatoes, gayle food cake, 50% meatloaf, strawberry yogurt)  # Supplements Recorded: 50% Boost Plus

## 2018-09-02 NOTE — PROGRESS NOTES
D: Pt with PMH of NICM s/p heart txp (6/14/18), ESRD on HD, R internal jugular thrombus on apixaban and DM2 p/w 3-4 bright red BM and fevers/chills, found to have a daniella-colonic abscess (7.8 cm), daniella-rectal abscess (2.7 cm), probable diverticulitis, and R colon colitis now s/p colostomy.   A/I: Pt is A&Ox4. Pt is able to make needs known and uses call light appropriately. Pt VSS on RA. Heart rhythm: SR/ST rates 90's-100's. Pt is up with ind/SBA. Pt on HD and is Oliguric. No urine overnight. Ostomy bag emptied at HS, medium amount of dark brown soft stool and gas in bag. Encouraged pt participation with colostomy cares, pt continues to minimally participate. RN ordered reenforcement teaching/PCL consult to address. Pt reports dull lower abdominal pain and received Tramadol PO PRN at HS. Access: 1 L PIV SL. Low fiber diet. Pt on calorie counts.  P: RN will continue to monitor and assess. RN will update team with any changes/concerns. Chelle Lane

## 2018-09-03 LAB
ALBUMIN SERPL-MCNC: 1.9 G/DL (ref 3.4–5)
ALP SERPL-CCNC: 138 U/L (ref 40–150)
ALT SERPL W P-5'-P-CCNC: 14 U/L (ref 0–70)
ANION GAP SERPL CALCULATED.3IONS-SCNC: 8 MMOL/L (ref 3–14)
AST SERPL W P-5'-P-CCNC: 17 U/L (ref 0–45)
BASOPHILS # BLD AUTO: 0 10E9/L (ref 0–0.2)
BASOPHILS NFR BLD AUTO: 0 %
BILIRUB SERPL-MCNC: 0.5 MG/DL (ref 0.2–1.3)
BUN SERPL-MCNC: 22 MG/DL (ref 7–30)
CALCIUM SERPL-MCNC: 7.8 MG/DL (ref 8.5–10.1)
CHLORIDE SERPL-SCNC: 100 MMOL/L (ref 94–109)
CO2 SERPL-SCNC: 28 MMOL/L (ref 20–32)
CREAT SERPL-MCNC: 4.67 MG/DL (ref 0.66–1.25)
CRP SERPL-MCNC: 12 MG/L (ref 0–8)
DIFFERENTIAL METHOD BLD: ABNORMAL
EOSINOPHIL # BLD AUTO: 0.1 10E9/L (ref 0–0.7)
EOSINOPHIL NFR BLD AUTO: 2.5 %
ERYTHROCYTE [DISTWIDTH] IN BLOOD BY AUTOMATED COUNT: 20.4 % (ref 10–15)
GFR SERPL CREATININE-BSD FRML MDRD: 13 ML/MIN/1.7M2
GLUCOSE BLDC GLUCOMTR-MCNC: 102 MG/DL (ref 70–99)
GLUCOSE BLDC GLUCOMTR-MCNC: 185 MG/DL (ref 70–99)
GLUCOSE BLDC GLUCOMTR-MCNC: 187 MG/DL (ref 70–99)
GLUCOSE SERPL-MCNC: 100 MG/DL (ref 70–99)
HCT VFR BLD AUTO: 25.5 % (ref 40–53)
HGB BLD-MCNC: 8.2 G/DL (ref 13.3–17.7)
IMM GRANULOCYTES # BLD: 0 10E9/L (ref 0–0.4)
IMM GRANULOCYTES NFR BLD: 0.4 %
INR PPP: 1.05 (ref 0.86–1.14)
LYMPHOCYTES # BLD AUTO: 0.3 10E9/L (ref 0.8–5.3)
LYMPHOCYTES NFR BLD AUTO: 13.3 %
MAGNESIUM SERPL-MCNC: 1.8 MG/DL (ref 1.6–2.3)
MCH RBC QN AUTO: 31.5 PG (ref 26.5–33)
MCHC RBC AUTO-ENTMCNC: 32.2 G/DL (ref 31.5–36.5)
MCV RBC AUTO: 98 FL (ref 78–100)
MONOCYTES # BLD AUTO: 0.1 10E9/L (ref 0–1.3)
MONOCYTES NFR BLD AUTO: 5 %
NEUTROPHILS # BLD AUTO: 1.9 10E9/L (ref 1.6–8.3)
NEUTROPHILS NFR BLD AUTO: 78.8 %
NRBC # BLD AUTO: 0 10*3/UL
NRBC BLD AUTO-RTO: 0 /100
PHOSPHATE SERPL-MCNC: 2.2 MG/DL (ref 2.5–4.5)
PLATELET # BLD AUTO: 96 10E9/L (ref 150–450)
POTASSIUM SERPL-SCNC: 4 MMOL/L (ref 3.4–5.3)
PROT SERPL-MCNC: 5.2 G/DL (ref 6.8–8.8)
RBC # BLD AUTO: 2.6 10E12/L (ref 4.4–5.9)
SODIUM SERPL-SCNC: 136 MMOL/L (ref 133–144)
TACROLIMUS BLD-MCNC: 11.8 UG/L (ref 5–15)
TME LAST DOSE: NORMAL H
TRIGL SERPL-MCNC: 103 MG/DL
WBC # BLD AUTO: 2.4 10E9/L (ref 4–11)

## 2018-09-03 PROCEDURE — 25000132 ZZH RX MED GY IP 250 OP 250 PS 637: Mod: GY | Performed by: NURSE PRACTITIONER

## 2018-09-03 PROCEDURE — 36415 COLL VENOUS BLD VENIPUNCTURE: CPT | Performed by: COLON & RECTAL SURGERY

## 2018-09-03 PROCEDURE — 84100 ASSAY OF PHOSPHORUS: CPT | Performed by: COLON & RECTAL SURGERY

## 2018-09-03 PROCEDURE — 86140 C-REACTIVE PROTEIN: CPT | Performed by: COLON & RECTAL SURGERY

## 2018-09-03 PROCEDURE — 85610 PROTHROMBIN TIME: CPT | Performed by: COLON & RECTAL SURGERY

## 2018-09-03 PROCEDURE — 85049 AUTOMATED PLATELET COUNT: CPT | Performed by: STUDENT IN AN ORGANIZED HEALTH CARE EDUCATION/TRAINING PROGRAM

## 2018-09-03 PROCEDURE — 87385 HISTOPLASMA CAPSUL AG IA: CPT | Performed by: NURSE PRACTITIONER

## 2018-09-03 PROCEDURE — A9270 NON-COVERED ITEM OR SERVICE: HCPCS | Mod: GY | Performed by: NURSE PRACTITIONER

## 2018-09-03 PROCEDURE — 25000125 ZZHC RX 250: Performed by: NURSE PRACTITIONER

## 2018-09-03 PROCEDURE — A9270 NON-COVERED ITEM OR SERVICE: HCPCS | Mod: GY | Performed by: STUDENT IN AN ORGANIZED HEALTH CARE EDUCATION/TRAINING PROGRAM

## 2018-09-03 PROCEDURE — 83735 ASSAY OF MAGNESIUM: CPT | Performed by: COLON & RECTAL SURGERY

## 2018-09-03 PROCEDURE — 25000131 ZZH RX MED GY IP 250 OP 636 PS 637: Mod: GY | Performed by: INTERNAL MEDICINE

## 2018-09-03 PROCEDURE — A9270 NON-COVERED ITEM OR SERVICE: HCPCS | Mod: GY | Performed by: INTERNAL MEDICINE

## 2018-09-03 PROCEDURE — 25000132 ZZH RX MED GY IP 250 OP 250 PS 637: Mod: GY | Performed by: STUDENT IN AN ORGANIZED HEALTH CARE EDUCATION/TRAINING PROGRAM

## 2018-09-03 PROCEDURE — 25000132 ZZH RX MED GY IP 250 OP 250 PS 637: Mod: GY | Performed by: INTERNAL MEDICINE

## 2018-09-03 PROCEDURE — 84134 ASSAY OF PREALBUMIN: CPT | Performed by: COLON & RECTAL SURGERY

## 2018-09-03 PROCEDURE — 84478 ASSAY OF TRIGLYCERIDES: CPT | Performed by: COLON & RECTAL SURGERY

## 2018-09-03 PROCEDURE — 85025 COMPLETE CBC W/AUTO DIFF WBC: CPT | Performed by: COLON & RECTAL SURGERY

## 2018-09-03 PROCEDURE — 80053 COMPREHEN METABOLIC PANEL: CPT | Performed by: COLON & RECTAL SURGERY

## 2018-09-03 PROCEDURE — 21400006 ZZH R&B CCU INTERMEDIATE UMMC

## 2018-09-03 PROCEDURE — 00000146 ZZHCL STATISTIC GLUCOSE BY METER IP

## 2018-09-03 PROCEDURE — 99233 SBSQ HOSP IP/OBS HIGH 50: CPT | Mod: GC | Performed by: INTERNAL MEDICINE

## 2018-09-03 PROCEDURE — 80197 ASSAY OF TACROLIMUS: CPT | Performed by: NURSE PRACTITIONER

## 2018-09-03 RX ADMIN — PREDNISONE 10 MG: 10 TABLET ORAL at 08:20

## 2018-09-03 RX ADMIN — HYDRALAZINE HYDROCHLORIDE 75 MG: 50 TABLET ORAL at 08:20

## 2018-09-03 RX ADMIN — PREDNISONE 5 MG: 5 TABLET ORAL at 20:02

## 2018-09-03 RX ADMIN — PANTOPRAZOLE SODIUM 40 MG: 40 TABLET, DELAYED RELEASE ORAL at 08:20

## 2018-09-03 RX ADMIN — HYDRALAZINE HYDROCHLORIDE 75 MG: 50 TABLET ORAL at 20:02

## 2018-09-03 RX ADMIN — INSULIN GLARGINE 20 UNITS: 100 INJECTION, SOLUTION SUBCUTANEOUS at 08:33

## 2018-09-03 RX ADMIN — NYSTATIN 1000000 UNITS: 100000 SUSPENSION ORAL at 20:02

## 2018-09-03 RX ADMIN — VANCOMYCIN HYDROCHLORIDE 125 MG: KIT at 08:20

## 2018-09-03 RX ADMIN — INSULIN ASPART 1 UNITS: 100 INJECTION, SOLUTION INTRAVENOUS; SUBCUTANEOUS at 19:13

## 2018-09-03 RX ADMIN — MYCOPHENOLATE MOFETIL 500 MG: 250 CAPSULE ORAL at 08:20

## 2018-09-03 RX ADMIN — NYSTATIN 1000000 UNITS: 100000 SUSPENSION ORAL at 08:19

## 2018-09-03 RX ADMIN — HYDRALAZINE HYDROCHLORIDE 75 MG: 50 TABLET ORAL at 16:08

## 2018-09-03 RX ADMIN — VANCOMYCIN HYDROCHLORIDE 125 MG: KIT at 20:01

## 2018-09-03 RX ADMIN — NYSTATIN 1000000 UNITS: 100000 SUSPENSION ORAL at 12:22

## 2018-09-03 RX ADMIN — TACROLIMUS 3 MG: 1 CAPSULE ORAL at 08:20

## 2018-09-03 RX ADMIN — HYDRALAZINE HYDROCHLORIDE 75 MG: 50 TABLET ORAL at 12:23

## 2018-09-03 RX ADMIN — TACROLIMUS 2.5 MG: 1 CAPSULE ORAL at 18:08

## 2018-09-03 RX ADMIN — NYSTATIN 1000000 UNITS: 100000 SUSPENSION ORAL at 16:08

## 2018-09-03 RX ADMIN — ATORVASTATIN CALCIUM 20 MG: 20 TABLET, FILM COATED ORAL at 20:02

## 2018-09-03 RX ADMIN — VANCOMYCIN HYDROCHLORIDE 125 MG: KIT at 16:08

## 2018-09-03 RX ADMIN — VANCOMYCIN HYDROCHLORIDE 125 MG: KIT at 12:23

## 2018-09-03 RX ADMIN — MYCOPHENOLATE MOFETIL 500 MG: 250 CAPSULE ORAL at 20:01

## 2018-09-03 RX ADMIN — Medication 1 CAPSULE: at 08:20

## 2018-09-03 ASSESSMENT — ACTIVITIES OF DAILY LIVING (ADL)
ADLS_ACUITY_SCORE: 12
ADLS_ACUITY_SCORE: 14
ADLS_ACUITY_SCORE: 12
ADLS_ACUITY_SCORE: 14
ADLS_ACUITY_SCORE: 14
ADLS_ACUITY_SCORE: 12

## 2018-09-03 NOTE — PROGRESS NOTES
[facility] Cardiology Progress Note           Assessment and Plan:     Murray Nicholson is a 63 year old male with a h/o NICM s/p heart txp (6/14/18), ESRD on HD, R internal jugular thrombus on apixaban and DM2 p/w 3-4 bright red BM and fevers/chills, found to have a daniella-colonic abscess (7.8 cm), daniella-rectal abscess (2.7 cm), probable diverticulitis, and R colon colitis now s/p colostomy.      Today's Plan:  -f/u blood cultures from 8/31 and 9/2  -trend CRP  -No dose change today  -discharge to ARU versus TCU when labs stable      # Daniella-colonic abscess (7.8 cm) s/p laparoscopic assisted sigmoid colectomy with end colostomy 8/14) positive for VRE  # Daniella-rectal abscess (2.7 cm) s/p drainage 8/12  # R colon colitis  # Positive c. Diff  s/p course cipro, linezolid, flagyl 8/14-8/27. CT enterography 8/30 without abscess, and interval improvement of dilated bowel.   - Continue PO vanc x 10 days after abx d/c'ed (through 9/6)  - Miralax daily per GI, APAP, tramadol PRN pain  - Colorectal, transplant ID following, appreciate assistance  - Low residual diet with supplements  - repeated blood cx 8/31 and 9/2 given low grade fevers, NGTD, trend CRPs per ID     # Recent right internal jugular line-associated thrombus  # Acute on chronic anemia  # Melena, resolved  Noted to have melena in ostomy, holding heparin gtt and ASA since 8/22. S/p 1 unit pRBC 8/29 for hgb 6.5, likely partially dilutional after IVF, and 1 unit 9/1 for hgb 6.9 now stable.   - Continue to hold heparin gtt/NOAC/ASA  - High threshold to transfuse given potential future renal Tx/sensitization   - Per CRS, no scope due to risk of disruption anastomosis, we prefer to defer (despite drop in hgb) due to leukopenia     # Status post OHT on 6/13/18, history of ICM  # Donor three vessel CAD  Immunosuppression:   --continue prednisone 10 mg in AM and 5 mg in PM then per taper with negative bx  --continue Cellcept 500 mg BID  --continue tacrolimus 3 mg bid (goal  "6-8 due to infection), daily troughs given recent gtt, above goal today, elected to recheck in AM before dose change  --DSAs 8/29 negative     Prophylaxis:   --CAV: aspirin, atorvastatin 20 mg daily (cost concern with rosuva)  --Thrush: Nystatin swish and swallow  --PCP: s/p pentamidine 8/17, due 9/14  --GI: Protonix   --CMV: valcyte 450 mg Tues and Sat, renally dosed, ideally 6 mos post-Tx  --Bones: calcium/vitamin D       Serostatus: HSV1-, HSV1+, CMV D+/R-, EBV D?/R+, VZV+     Biopsies: negative 8/24, due 9/7     # Thrombocytopenia, improving  # Pancytopenia  Starting 8/28 all cell count down - but anemia can be explained by ESRD and leukopenia has been chronic and can be explained by Cellcept (CMV negative).  - HIT negative, hemolysis labs negative  - repeated cultures given low grade fever to r/o sepsis as cause, NGTD  - ?can be caused by hydralazine in rare cases, consider changing antiHTN regimen if persists     # Malnutrition  - nutrition following, continue calorie counts      # Recent pseudomonas bacteremia  # Necrotic mouth ulcer, resolving  Had profound neutropenia and pseudomonas bacteremia several weeks ago in associated with necrotic mouth ulcer; neutropenia likely immunosuppression-induced. Neutropenia resolved last admission and pseudomonal bacteremia is likely cleared. Necrotic ulcer appears to be resolving from prior admission. Not currently active issues but will continue to monitor. Low threshold to repeat cultures.       # Pulmonary nodules  Stable nodule but also new RLL nodules 5 mm and 3 mm noted incidentally on CT 8/12 and increased in size on 8/29 CT enterography.  - follow up chest CT in 4-6 weeks (~9/16)  - awaiting Histoplasma urine Ag assay, reordered today      # ESRD on TTS HD inactive for transplant as of 8/29 due to being \"too sick\". Per renal, needs to rehab and better nutritional status to be active.   - Nephrology following, appreciate assistance      # HTN  - elevated more " overnight, increase hydralazine back to 75 mg qid and hold AM of dialysis, consider changing if thrombocytopenia persists     # DM2   - Glargine increased 8/24, medium sliding scale insulin  - Monitor, may need adjustment now that taking PO      FEN: Low residue  PPx: Holding heparin for now, SCDs ordered  Dispo: TCU, hopefully Tuesday    Angelito Fountain MD   Internal Medicine PGY-3  Pager: [phone]      Patient was seen by and the above plan discussed with Dr. Austin.     Subjective:     Patient denies current chest pain, dyspnea on exertion, orthopnea, PND, lightheadedness, or palpitations.           Review of Systems:   A comprehensive review of systems was performed and found to be negative except as described in this note          Medications:     Current Facility-Administered Medications   Medication     acetaminophen (TYLENOL) tablet 1,000 mg     albuterol (PROAIR HFA/PROVENTIL HFA/VENTOLIN HFA) 108 (90 Base) MCG/ACT inhaler 6 puff     atorvastatin (LIPITOR) tablet 20 mg     dextrose 10 % 1,000 mL infusion     glucose gel 15-30 g    Or     dextrose 50 % injection 25-50 mL    Or     glucagon injection 1 mg     fluticasone (FLONASE) 50 MCG/ACT spray 1-2 spray     hydrALAZINE (APRESOLINE) tablet 75 mg     insulin aspart (NovoLOG) inj (RAPID ACTING)     insulin aspart (NovoLOG) inj (RAPID ACTING)     insulin glargine (LANTUS) injection 20 Units     lidocaine (LMX4) cream     lidocaine (XYLOCAINE) 2 % topical gel     lidocaine 1 % 1 mL     mycophenolate (GENERIC EQUIVALENT) capsule 500 mg     naloxone (NARCAN) injection 0.1-0.4 mg     NEPHROCAPS capsule 1 capsule     nystatin (MYCOSTATIN) suspension 1,000,000 Units     ondansetron (ZOFRAN) injection 4 mg     pantoprazole (PROTONIX) EC tablet 40 mg     polyethylene glycol (MIRALAX/GLYCOLAX) Packet 17 g     predniSONE (DELTASONE) tablet 10 mg     predniSONE (DELTASONE) tablet 5 mg     prochlorperazine (COMPAZINE) injection 10 mg     simethicone (MYLICON) chewable  tablet 80 mg     sodium chloride (PF) 0.9% PF flush 3 mL     sodium chloride (PF) 0.9% PF flush 3 mL     tacrolimus (GENERIC EQUIVALENT) capsule 2.5 mg     traMADol (ULTRAM) half-tab 25-50 mg     triamcinolone (KENALOG) 0.1 % ointment     valGANciclovir (VALCYTE) tablet 450 mg     vancomycin (FIRVANQ) oral solution 125 mg               Objective:   Temp: 98.3  F (36.8  C) Temp src: Oral BP: (!) 133/94   Heart Rate: 97 Resp: 18 SpO2: 100 % O2 Device: None (Room air)      ROUTINE ICU LABS (Last four results)  CMP  Recent Labs  Lab 09/03/18  0616 09/02/18  0634 09/01/18  0632 08/31/18  0646 08/30/18  0724 08/29/18  0622    137 134 134 131* 133   POTASSIUM 4.0 3.8 4.0 3.9 3.9 4.3   CHLORIDE 100 100 98 98 97 97   CO2 28 28 27 29 24 26   ANIONGAP 8 9 10 7 10 10   * 112* 136* 121* 111* 214*   BUN 22 17 30 24 47* 40*   CR 4.67* 3.59* 5.04* 3.81* 5.29* 4.09*   GFRESTIMATED 13* 17* 12* 16* 11* 15*   GFRESTBLACK 15* 21* 14* 19* 13* 18*   TRINI 7.8* 8.0* 7.9* 7.4* 7.6* 7.6*   MAG 1.8  --   --  1.6 1.8 1.9   PHOS 2.2*  --   --  2.6  --  3.1   PROTTOTAL 5.2*  --   --  4.7*  --   --    ALBUMIN 1.9*  --   --  1.6*  --   --    BILITOTAL 0.5  --   --  0.5  --   --    ALKPHOS 138  --   --  133  --   --    AST 17  --   --  17  --   --    ALT 14  --   --  14  --   --      CBC  Recent Labs  Lab 09/03/18  0616 09/02/18  0634 09/01/18  0632 08/31/18  0646   WBC 2.4* 3.2* 2.9* 3.2*   RBC 2.60* 2.57* 2.21* 2.24*   HGB 8.2* 8.0* 6.9* 7.1*   HCT 25.5* 25.1* 21.6* 21.5*   MCV 98 98 98 96   MCH 31.5 31.1 31.2 31.7   MCHC 32.2 31.9 31.9 33.0   RDW 20.4* 20.2* 19.5* 19.7*   PLT 96* 61* 46* 43*     INR  Recent Labs  Lab 09/03/18  0616   INR 1.05     Arterial Blood GasNo lab results found in last 7 days.    Physical exam:  General: Patient lying comfortably in bed, NAD   HEENT: EOMI, no scleral icterus or injection, no bruits appreciated, no JVD, MMM  Cardiac: RRR, no m/r/g appreciated.   Respiratory: CTAB, no wheezes, rhonchi or crackles  appreciated.  GI: NABS, NT/ND, no guarding or rebound  Extremities: No LE edema, pulses DP 2+, radial pulses 2+   Skin: No acute lesions appreciated  Neuro: AOx3, CN II-XII grossly intact, normal muscle power, normal sensory function      I personally provided care for this patient, reviewed chart, discussed course with patient, housestaff and consulting physicians.  I answered all questions.    Rhett Austin M.D.  Division of Cardiology  Department of Medicine

## 2018-09-03 NOTE — PLAN OF CARE
Problem: Patient Care Overview  Goal: Plan of Care/Patient Progress Review    D: : Murray Nicholson is a 63 year old male with a h/o NICM s/p heart txp (6/14/18), ESRD on HD, R internal jugular thrombus on apixaban and DM2 p/w 3-4 bright red BM and fevers/chills, found to have a daniella-colonic abscess (7.8 cm), daniella-rectal abscess (2.7 cm), probable diverticulitis, and R colon colitis now s/p colostomy.  I: Encouraging activity, ISS with meals, calorie counts tracked. Told patient if he voids, we need to collect sample.  A: SR/ST, BPs elevated and hydralazine dose increased, colostomy with formed, brown stool, emptied once this shift for medium amount. No void this shift.  P: Continue calorie counts. Watch for lab results from sepsis protocol. 500cc LR ordered, initiated at end of shift and will run over a few hours due to small PIV.

## 2018-09-03 NOTE — PLAN OF CARE
Problem: Surgery Nonspecified (Adult)  Goal: Signs and Symptoms of Listed Potential Problems Will be Absent, Minimized or Managed (Surgery Nonspecified)  Signs and symptoms of listed potential problems will be absent, minimized or managed by discharge/transition of care (reference Surgery Nonspecified (Adult) CPG).   Outcome: No Change  ---low WBC with decreasing abs neuts on MMF--  D/noted declining and decreased WBC continuing as well as declining abs neuts and my concern about this.  I/called cards 2 team and told them my concern, asked about this and whether they want to decrease the MMF dose.   D/He reported to me that because he is only a few months after his TP, and the risk of rejection remains high, they do NOT want to decrease MMF dose at this time and are continuing to monitor him closely  A/this remains a concern for me  P/monitor for changes  D/He expects to go to rehab tomorrow. He looks forward to getting out of the hospital and hopes to get well slowly, and get his strength back to go home  I/I talked to him about low WBC and need for monitoring, and risk of getting infection with lowered WBC.  P/continue to monitor and keep team udpated

## 2018-09-03 NOTE — PROGRESS NOTES
Calorie Counts  Intake recorded for: 9/2                   Kcals: 1068                   Protein: 24g  # Meals Recorded: 3 meals (First- 100% oatmeal, 50% sausage nathaniel, coffee, 25% english muffin with butter, honey, and peanut butter)      (Second- 75% cream of wheat, 50% banana bread with peanut butter, honey, coffee with creamer)     (Third- 50% sausage nathaniel, mashed potatoes, 33% apple sauce, chocolate ice cream)  # Supplements Recorded: 0

## 2018-09-03 NOTE — PLAN OF CARE
Problem: Patient Care Overview  Goal: Plan of Care/Patient Progress Review    D; Patient s/p history of heart transplant 6/2018, admitted from rehab with bloody stools and chills. Patent had sigmoidectomy and end colostomy 8/14 c/b ileus. He has received 4units RBC since 8/22.  I: Ambulated in hallway this [facility]BG checks before meals, calorie counts tracked. Sent urine sample for histoplasma capsulatum.  A: SR/ST, elevated BP this morning but have normalized, colostomy with formed, brown stool, emptied once this afternoon for medium amount. One small void this shift (80cc). He reports feeling stronger and tolerating more activity each day.  P: Possible transfer to rehab tomorrow if bed available.

## 2018-09-03 NOTE — PROGRESS NOTES
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 Right TDC, EDW increased to 79.5 kg   - Will continue TTS schedule   - No heparin   - list for kidney transplant with wait time dating from 3/24/16 - currently inactive as too ill to undergo kidney transplantation surgery, he will not get organ offers but he will continue to accrue weight time      Volume status: EDW 79.5 kg. Though he has edema, his RHC on 8/24 showed low filling pressures (RA 1).    - Continue pre run weights, standing   - Strict I/O  -  EDW 79.5 kg based on RHC from 8/24/18  - now that he is walking he may start to mobilize this fluid and may be reasonable to challenge weight       Hyponatremia -due to excess water intake in setting of kidneys that are not able to excrete water, minimize water intake      Hypertension: 130-150's  Could consider ACEi though has side effect of anemia and not ideal in this patient with current epo resistance and ongoing requirement of PRBC.    - Per cards, on hydralazine 75 mg tid         Transplant IS regimen: Tac, MMF, Pred      BMD: Ca 7.6, alb 1.8, phos 3.1    GIB: resolved  Thrombocytopenia: worsening  -HIT panel pending, peripheral and CVC cx drawn 8/31; lysis labs neg  - workup per primary team    Anemia: hemoglobin 6.9 with ongoing transfusion needs.  This is out of proportion to what is typically seen in ESRD and he is now on max dose of epogen.  - continue  "epogen to 10,000 units  - transfuse pRBC per primary team           Pericolonic abscess, small perircal abscess: Underwent I/D anorectal abscess on 8/13 and  Sigmoidectomy/end colostomy on 8/14.      C diff: on PO vanc      Lung nodules - New Right lower lobe pulmonary nodules seen on CT this admission. ID recommending close f/u.         Recommendations were communicated to primary team via progress note    Ana Luisa Mcpherson MD  [facility]  Department of Medicine  Division of Renal Disease and Hypertension  [phone]           Interval History :   Seen in his room, in last 24 hours the sepsis protocol was trigger, lactate was elevated and he received IV crystalloid and feels since then his edema is worse.  No infectious source identified.   No weight today but he feels he is over 80 kg. He continues to feel stronger and has been able to walk to other units and back.  Appetite is OK, no dyspnea, no chest pain, no fever or chills.  No nausea or vomiting.  He urinated twice this morning.      Review of Systems:   4 point ROS negative other than as noted above.    Physical Exam:   I/O last 3 completed shifts:  In: 840 [P.O.:840]  Out: 75 [Urine:75]   BP (!) 133/94 (BP Location: Left arm)  Pulse 106  Temp 98.3  F (36.8  C) (Oral)  Resp 18  Ht 1.753 m (5' 9\")  Wt 79.7 kg (175 lb 11.3 oz)  SpO2 100%  BMI 25.95 kg/m2     GENERAL APPEARANCE: alert, NAD  EYES: no scleral icterus, pupils equal  Pulmonary: normal work of breathing  CV: WWP   - Edema 2+ upper and lower extremity edema  NEURO: alert/oriented, face symmetric and speech is fluent  ACCESS: Right TDC    Labs:   All labs reviewed by me  Electrolytes/Renal -   Recent Labs   Lab Test  09/03/18   0616  09/02/18   0634  09/01/18   0632  08/31/18   0646  08/30/18   0724  08/29/18   0622   NA  136  137  134  134  131*  133   POTASSIUM  4.0  3.8  4.0  3.9  3.9  4.3   CHLORIDE  100  100  98  98  97  97   CO2  28  28  27  29  24  26   BUN  22  " 17  30  24  47*  40*   CR  4.67*  3.59*  5.04*  3.81*  5.29*  4.09*   GLC  100*  112*  136*  121*  111*  214*   TRINI  7.8*  8.0*  7.9*  7.4*  7.6*  7.6*   MAG  1.8   --    --   1.6  1.8  1.9   PHOS  2.2*   --    --   2.6   --   3.1       CBC -   Recent Labs   Lab Test  09/03/18   0616  09/02/18   0634  09/01/18   0632   WBC  2.4*  3.2*  2.9*   HGB  8.2*  8.0*  6.9*   PLT  96*  61*  46*       LFTs -   Recent Labs   Lab Test  09/03/18   0616  08/31/18   0646  08/27/18   0644   ALKPHOS  138  133  133   BILITOTAL  0.5  0.5  0.5   ALT  14  14  13   AST  17  17  16   PROTTOTAL  5.2*  4.7*  5.2*   ALBUMIN  1.9*  1.6*  1.8*       Iron Panel -   Recent Labs   Lab Test  07/10/18   0711  05/08/18   0954  07/19/17   1306   IRON  66  58  46   IRONSAT  28  27  18   ALBERTINA  771*  621*  369       Current Medications:    atorvastatin  20 mg Oral QPM     fluticasone  1-2 spray Both Nostrils Daily     hydrALAZINE  75 mg Oral 4x Daily     insulin aspart  1-6 Units Subcutaneous TID w/meals     insulin aspart  1-5 Units Subcutaneous At Bedtime     insulin glargine  20 Units Subcutaneous QAM AC     mycophenolate  500 mg Oral BID     NEPHROCAPS  1 capsule Oral Daily     nystatin  1,000,000 Units Swish & Swallow 4x Daily     pantoprazole  40 mg Oral QAM AC     predniSONE  10 mg Oral Daily     predniSONE  5 mg Oral Daily     sodium chloride (PF)  3 mL Intracatheter Q8H     tacrolimus  3 mg Oral BID IS     triamcinolone   Topical BID     valGANciclovir  450 mg Oral Once per day on Tue Sat     vancomycin  125 mg Oral 4x Daily       IV fluid REPLACEMENT ONLY       Ana Luisa Mcpherson MD

## 2018-09-03 NOTE — PROGRESS NOTES
D: Pt with PMH of NICM s/p heart txp (6/14/18), ESRD on HD, R internal jugular thrombus on apixaban and DM2 p/w 3-4 bright red BM and fevers/chills, found to have a daniella-colonic abscess (7.8 cm), daniella-rectal abscess (2.7 cm), probable diverticulitis, and R colon colitis now s/p colostomy.   A/I: Pt is A&Ox4. Pt is able to make needs known and uses call light appropriately. Pt VSS on RA. Heart rhythm: SR/ST rates 90's-100's. Pt is up with ind/SBA. Pt on HD and is Oliguric. No urine overnight. Ostomy bag emptied at HS, medium amount of dark brown soft stool and gas in bag. Encouraged pt participation with colostomy cares, pt continues to minimally participate. RN ordered reenforcement teaching. PCL consulted but does not teach this info. RN placed note in Dr. Cruz note portion of chart to address. Pt reports dull lower abdominal pain and received Tramadol PO PRN at HS. Access: 1 L PIV SL. Low fiber diet. Pt on calorie counts.  P: RN will continue to monitor and assess. RN will update team with any changes/concerns. Chelle Lane

## 2018-09-03 NOTE — PROGRESS NOTES
Colorectal Surgery Progress Note  POD#22    Subjective: Triggered sepsis protocol yesterday with improvement in lactic acidosis on recheck. Continues to have brown ostomy output. Hgb stable.    Vitals:  Vitals:    09/02/18 2026 09/02/18 2230 09/03/18 0400 09/03/18 0805   BP: (!) 138/99 (!) 156/98  (!) 156/102   BP Location:  Left arm  Left arm   Pulse:       Resp: 18 18 18 18   Temp: 98.5  F (36.9  C) 98.7  F (37.1  C)  98.1  F (36.7  C)   TempSrc: Oral Oral  Oral   SpO2: 100%   100%   Weight:       Height:           I/O:  I/O last 3 completed shifts:  In: 840 [P.O.:840]  Out: 75 [Urine:75]    Physical Exam:  Gen: AAOx3, NAD, lying in bed  Pulm: Non-labored breathing  Abd: Soft, non-distended, non-tender   Incision C/D/I with dermabond    Stoma pink and viable with brown semi-solid stool and gas in bag  Ext:  Warm and well-perfused    BMP    Recent Labs  Lab 09/03/18  0616 09/02/18  0634 09/01/18  0632 08/31/18  0646 08/30/18  0724 08/29/18  0622    137 134 134 131* 133   POTASSIUM 4.0 3.8 4.0 3.9 3.9 4.3   CHLORIDE 100 100 98 98 97 97   CO2 28 28 27 29 24 26   BUN 22 17 30 24 47* 40*   CR 4.67* 3.59* 5.04* 3.81* 5.29* 4.09*   * 112* 136* 121* 111* 214*   MAG 1.8  --   --  1.6 1.8 1.9   PHOS 2.2*  --   --  2.6  --  3.1     CBC    Recent Labs  Lab 09/03/18  0616 09/02/18  0634 09/01/18  0632 08/31/18  0646   WBC 2.4* 3.2* 2.9* 3.2*   HGB 8.2* 8.0* 6.9* 7.1*   HCT 25.5* 25.1* 21.6* 21.5*   PLT 96* 61* 46* 43*         ASSESSMENT: This is a 63 year old male with NICM s/p heart transplant 6/2018 on immunosuppressants, with ESRD on HD, R internal jugular thrombus previously on apixaban, T2DM, hx pseudomonal bacteremia admitted to cards service for painless BRBPR. Found to have diverticulitis with 7.8 cm pericolonic abscess without safe window for IR drain. 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   Post-op course complicated by ileus, and concern for bleeding after initiation of anticoagulation, with slowly down-trending Hgb. Has received transfusion of PRBC x4 since 8/22, Hgb now stable. Ileus resolved with return of bowel function. Issues with dizziness resolving with less fluid removed during dialysis. Tolerating low residue diet, taking PO pain medications, anticipating discharge when bed available. Appreciate care per primary team.     - Continue low residue diet and encourage supplements as tolerated  - Continue Miralax daily  - Hgb stable. No indication for endoscopy or further imaging at this time. Holding anticoagulation, management per primary team. On epogen  - Will follow up outpatient with Dr. Tran     Please call with further questions or concerns. Colorectal Surgery will continue to follow.     Sp Dunlap MD   PGY-3, GEN SURG

## 2018-09-04 ENCOUNTER — APPOINTMENT (OUTPATIENT)
Dept: PHYSICAL THERAPY | Facility: CLINIC | Age: 63
DRG: 329 | End: 2018-09-04
Payer: MEDICARE

## 2018-09-04 LAB
ANION GAP SERPL CALCULATED.3IONS-SCNC: 8 MMOL/L (ref 3–14)
BASOPHILS # BLD AUTO: 0 10E9/L (ref 0–0.2)
BASOPHILS NFR BLD AUTO: 0 %
BUN SERPL-MCNC: 29 MG/DL (ref 7–30)
CALCIUM SERPL-MCNC: 8 MG/DL (ref 8.5–10.1)
CHLORIDE SERPL-SCNC: 102 MMOL/L (ref 94–109)
CO2 SERPL-SCNC: 26 MMOL/L (ref 20–32)
CREAT SERPL-MCNC: 5.95 MG/DL (ref 0.66–1.25)
DIFFERENTIAL METHOD BLD: ABNORMAL
EOSINOPHIL # BLD AUTO: 0.1 10E9/L (ref 0–0.7)
EOSINOPHIL NFR BLD AUTO: 3 %
ERYTHROCYTE [DISTWIDTH] IN BLOOD BY AUTOMATED COUNT: 21.3 % (ref 10–15)
GFR SERPL CREATININE-BSD FRML MDRD: 10 ML/MIN/1.7M2
GLUCOSE BLDC GLUCOMTR-MCNC: 136 MG/DL (ref 70–99)
GLUCOSE BLDC GLUCOMTR-MCNC: 141 MG/DL (ref 70–99)
GLUCOSE BLDC GLUCOMTR-MCNC: 153 MG/DL (ref 70–99)
GLUCOSE BLDC GLUCOMTR-MCNC: 178 MG/DL (ref 70–99)
GLUCOSE SERPL-MCNC: 150 MG/DL (ref 70–99)
HCT VFR BLD AUTO: 26.2 % (ref 40–53)
HGB BLD-MCNC: 8.1 G/DL (ref 13.3–17.7)
IMM GRANULOCYTES # BLD: 0 10E9/L (ref 0–0.4)
IMM GRANULOCYTES NFR BLD: 0.5 %
LACTATE BLD-SCNC: NORMAL MMOL/L (ref 0.7–2)
LYMPHOCYTES # BLD AUTO: 0.3 10E9/L (ref 0.8–5.3)
LYMPHOCYTES NFR BLD AUTO: 13.1 %
MCH RBC QN AUTO: 31.3 PG (ref 26.5–33)
MCHC RBC AUTO-ENTMCNC: 30.9 G/DL (ref 31.5–36.5)
MCV RBC AUTO: 101 FL (ref 78–100)
MONOCYTES # BLD AUTO: 0.1 10E9/L (ref 0–1.3)
MONOCYTES NFR BLD AUTO: 4 %
NEUTROPHILS # BLD AUTO: 1.6 10E9/L (ref 1.6–8.3)
NEUTROPHILS NFR BLD AUTO: 79.4 %
NRBC # BLD AUTO: 0 10*3/UL
NRBC BLD AUTO-RTO: 0 /100
PLATELET # BLD AUTO: 115 10E9/L (ref 150–450)
POTASSIUM SERPL-SCNC: 4.3 MMOL/L (ref 3.4–5.3)
PREALB SERPL IA-MCNC: 31 MG/DL (ref 15–45)
RBC # BLD AUTO: 2.59 10E12/L (ref 4.4–5.9)
SODIUM SERPL-SCNC: 136 MMOL/L (ref 133–144)
WBC # BLD AUTO: 2 10E9/L (ref 4–11)

## 2018-09-04 PROCEDURE — 40000193 ZZH STATISTIC PT WARD VISIT

## 2018-09-04 PROCEDURE — 00000146 ZZHCL STATISTIC GLUCOSE BY METER IP

## 2018-09-04 PROCEDURE — A9270 NON-COVERED ITEM OR SERVICE: HCPCS | Mod: GY | Performed by: NURSE PRACTITIONER

## 2018-09-04 PROCEDURE — 80048 BASIC METABOLIC PNL TOTAL CA: CPT | Performed by: STUDENT IN AN ORGANIZED HEALTH CARE EDUCATION/TRAINING PROGRAM

## 2018-09-04 PROCEDURE — 97530 THERAPEUTIC ACTIVITIES: CPT | Mod: GP

## 2018-09-04 PROCEDURE — 97116 GAIT TRAINING THERAPY: CPT | Mod: GP

## 2018-09-04 PROCEDURE — 25000132 ZZH RX MED GY IP 250 OP 250 PS 637: Mod: GY | Performed by: NURSE PRACTITIONER

## 2018-09-04 PROCEDURE — A9270 NON-COVERED ITEM OR SERVICE: HCPCS | Mod: GY | Performed by: STUDENT IN AN ORGANIZED HEALTH CARE EDUCATION/TRAINING PROGRAM

## 2018-09-04 PROCEDURE — 21400006 ZZH R&B CCU INTERMEDIATE UMMC

## 2018-09-04 PROCEDURE — 63400005 ZZH RX 634: Performed by: INTERNAL MEDICINE

## 2018-09-04 PROCEDURE — 25000128 H RX IP 250 OP 636: Performed by: NURSE PRACTITIONER

## 2018-09-04 PROCEDURE — 85025 COMPLETE CBC W/AUTO DIFF WBC: CPT | Performed by: STUDENT IN AN ORGANIZED HEALTH CARE EDUCATION/TRAINING PROGRAM

## 2018-09-04 PROCEDURE — 99232 SBSQ HOSP IP/OBS MODERATE 35: CPT | Performed by: NURSE PRACTITIONER

## 2018-09-04 PROCEDURE — 25000132 ZZH RX MED GY IP 250 OP 250 PS 637: Mod: GY | Performed by: STUDENT IN AN ORGANIZED HEALTH CARE EDUCATION/TRAINING PROGRAM

## 2018-09-04 PROCEDURE — 25000132 ZZH RX MED GY IP 250 OP 250 PS 637: Mod: GY | Performed by: INTERNAL MEDICINE

## 2018-09-04 PROCEDURE — 97110 THERAPEUTIC EXERCISES: CPT | Mod: GP

## 2018-09-04 PROCEDURE — 25000131 ZZH RX MED GY IP 250 OP 636 PS 637: Mod: GY | Performed by: INTERNAL MEDICINE

## 2018-09-04 PROCEDURE — 25000132 ZZH RX MED GY IP 250 OP 250 PS 637: Mod: GY | Performed by: COLON & RECTAL SURGERY

## 2018-09-04 PROCEDURE — 36415 COLL VENOUS BLD VENIPUNCTURE: CPT | Performed by: INTERNAL MEDICINE

## 2018-09-04 PROCEDURE — 25000128 H RX IP 250 OP 636: Performed by: INTERNAL MEDICINE

## 2018-09-04 PROCEDURE — A9270 NON-COVERED ITEM OR SERVICE: HCPCS | Mod: GY | Performed by: INTERNAL MEDICINE

## 2018-09-04 PROCEDURE — 83605 ASSAY OF LACTIC ACID: CPT | Performed by: INTERNAL MEDICINE

## 2018-09-04 PROCEDURE — 40000903 ZZH STATISTIC WOC PT EDUCATION, 31-45 MIN

## 2018-09-04 PROCEDURE — 36415 COLL VENOUS BLD VENIPUNCTURE: CPT | Performed by: STUDENT IN AN ORGANIZED HEALTH CARE EDUCATION/TRAINING PROGRAM

## 2018-09-04 PROCEDURE — 90937 HEMODIALYSIS REPEATED EVAL: CPT

## 2018-09-04 PROCEDURE — 25000125 ZZHC RX 250: Performed by: NURSE PRACTITIONER

## 2018-09-04 RX ORDER — HYDRALAZINE HYDROCHLORIDE 20 MG/ML
10 INJECTION INTRAMUSCULAR; INTRAVENOUS EVERY 6 HOURS PRN
Status: DISCONTINUED | OUTPATIENT
Start: 2018-09-04 | End: 2018-09-11 | Stop reason: HOSPADM

## 2018-09-04 RX ORDER — AMLODIPINE BESYLATE 5 MG/1
5 TABLET ORAL DAILY
Status: DISCONTINUED | OUTPATIENT
Start: 2018-09-04 | End: 2018-09-06

## 2018-09-04 RX ADMIN — INSULIN ASPART 1 UNITS: 100 INJECTION, SOLUTION INTRAVENOUS; SUBCUTANEOUS at 20:33

## 2018-09-04 RX ADMIN — VALGANCICLOVIR 450 MG: 450 TABLET, FILM COATED ORAL at 17:25

## 2018-09-04 RX ADMIN — VANCOMYCIN HYDROCHLORIDE 125 MG: KIT at 20:32

## 2018-09-04 RX ADMIN — HYDRALAZINE HYDROCHLORIDE 75 MG: 50 TABLET ORAL at 12:19

## 2018-09-04 RX ADMIN — INSULIN GLARGINE 20 UNITS: 100 INJECTION, SOLUTION SUBCUTANEOUS at 09:28

## 2018-09-04 RX ADMIN — NYSTATIN 1000000 UNITS: 100000 SUSPENSION ORAL at 14:35

## 2018-09-04 RX ADMIN — TACROLIMUS 2.5 MG: 1 CAPSULE ORAL at 09:24

## 2018-09-04 RX ADMIN — AMLODIPINE BESYLATE 5 MG: 5 TABLET ORAL at 14:45

## 2018-09-04 RX ADMIN — HYDRALAZINE HYDROCHLORIDE 75 MG: 50 TABLET ORAL at 09:19

## 2018-09-04 RX ADMIN — SODIUM CHLORIDE 300 ML: 9 INJECTION, SOLUTION INTRAVENOUS at 09:31

## 2018-09-04 RX ADMIN — SODIUM CHLORIDE 250 ML: 9 INJECTION, SOLUTION INTRAVENOUS at 09:31

## 2018-09-04 RX ADMIN — VANCOMYCIN HYDROCHLORIDE 125 MG: KIT at 10:13

## 2018-09-04 RX ADMIN — EPOETIN ALFA 10000 UNITS: 10000 SOLUTION INTRAVENOUS; SUBCUTANEOUS at 09:30

## 2018-09-04 RX ADMIN — HYDRALAZINE HYDROCHLORIDE 10 MG: 20 INJECTION INTRAMUSCULAR; INTRAVENOUS at 11:43

## 2018-09-04 RX ADMIN — HYDRALAZINE HYDROCHLORIDE 75 MG: 50 TABLET ORAL at 20:32

## 2018-09-04 RX ADMIN — ATORVASTATIN CALCIUM 20 MG: 20 TABLET, FILM COATED ORAL at 20:32

## 2018-09-04 RX ADMIN — TACROLIMUS 2.5 MG: 1 CAPSULE ORAL at 17:18

## 2018-09-04 RX ADMIN — HYDRALAZINE HYDROCHLORIDE 75 MG: 50 TABLET ORAL at 17:19

## 2018-09-04 RX ADMIN — MYCOPHENOLATE MOFETIL 500 MG: 250 CAPSULE ORAL at 20:32

## 2018-09-04 RX ADMIN — MYCOPHENOLATE MOFETIL 500 MG: 250 CAPSULE ORAL at 09:20

## 2018-09-04 RX ADMIN — Medication 1 CAPSULE: at 09:25

## 2018-09-04 RX ADMIN — PREDNISONE 10 MG: 10 TABLET ORAL at 09:24

## 2018-09-04 RX ADMIN — Medication 50 MG: at 22:12

## 2018-09-04 RX ADMIN — PREDNISONE 5 MG: 5 TABLET ORAL at 20:32

## 2018-09-04 RX ADMIN — VANCOMYCIN HYDROCHLORIDE 125 MG: KIT at 14:34

## 2018-09-04 RX ADMIN — NYSTATIN 1000000 UNITS: 100000 SUSPENSION ORAL at 09:21

## 2018-09-04 RX ADMIN — INSULIN ASPART 1 UNITS: 100 INJECTION, SOLUTION INTRAVENOUS; SUBCUTANEOUS at 13:53

## 2018-09-04 RX ADMIN — PANTOPRAZOLE SODIUM 40 MG: 40 TABLET, DELAYED RELEASE ORAL at 09:23

## 2018-09-04 RX ADMIN — NYSTATIN 1000000 UNITS: 100000 SUSPENSION ORAL at 20:33

## 2018-09-04 RX ADMIN — VANCOMYCIN HYDROCHLORIDE 125 MG: KIT at 17:19

## 2018-09-04 RX ADMIN — NYSTATIN 1000000 UNITS: 100000 SUSPENSION ORAL at 17:18

## 2018-09-04 ASSESSMENT — ACTIVITIES OF DAILY LIVING (ADL)
ADLS_ACUITY_SCORE: 12

## 2018-09-04 ASSESSMENT — PAIN DESCRIPTION - DESCRIPTORS
DESCRIPTORS: DISCOMFORT
DESCRIPTORS: ACHING
DESCRIPTORS: DISCOMFORT

## 2018-09-04 NOTE — PROGRESS NOTES
HEMODIALYSIS TREATMENT NOTE    Date: 9/4/2018  Time: 1:08 PM    Data:  Pre Wt: 82.1 kg (181 lb)   Desired Wt: 79.5 kg   Post Wt: 79.5 kg (175 lb 4.3 oz)  Weight gain: -2.6 kg   Weight change: 2.6 kg  Ultrafiltration - Post Run Net Total Removed (mL): 2600 mL  Ultrafiltration - Post Run Net Total Gain (mL): 0 mL  Vascular Access Status: Yes, secured and visible  Dialyzer Rinse: Light  Total Blood Volume Processed: 69.5  Total Dialysis (Treatment) Time:  3.5 hours    Lab:   No    Interventions:  3.5hr HD tx completed via tunneled CVC with .  K3Ca3 bath per protocol.  Tolerated UF well, net UF 2.6L to get to target wt of 79.5kg.    AM meds administered in dialysis, see MAR for detail.    AM hydralazine held until 0900 to monitor BP with fluid removal.  Administered and pt remained HTN with -170/100's.  Paged primary team re: continued 's/120's, ordered PRN hydralazine 10mg IV administered at 1145.  BP decreased to 150's/100's, scheduled PO hydralazine administered at 1230.  Post 's/90's.  Pt asymptomatic of HTN, slept majority of tx.  Epo administered per order.  CVC dressing changed, CDI, tegos in place, NS locked.  Pt wanted to wait to order and eat lunch until returning to floor.  Hand off given to floor RN.    Assessment:  CVC patent and functioning well.  VSS after interventions.  Pt calm and cooperative with cares, A&O x4.     Plan:    Per renal team.

## 2018-09-04 NOTE — PROGRESS NOTES
[facility]   Cardiology II Service / Advanced Heart Failure  Daily Progress Note  Date of Service: 9/4/2018      Patient: Murray Nicholson  MRN: [id number]  Admission Date: 8/12/2018  Hospital Day # 23    Assessment and Plan: Murray Nicholson is a 63 year old male with a PMH of NICM s/p OHT 6/14/18, ESRD on HD, RIJ thrombus, and DM Type II. He presented with hematochezia secondary to colonic abscess, rectal abscess, and colitis.     Pericolonic abscess, rectal abscess, and Colitis. S/p laparoscopic sigmoid colectomy with end colostomy 8/14 and drain to rectal abscess 8/12. Biopsy positive for VRE. Completed Cipro and Flagyl course 8/14-8/27. CT enterography 8/30 negative for abscess with improvement in dilated bowel.   - Appreciate CRS consult.   - Miralax daily prn.   - Appreciate Transplant ID input. No recurrent indication for antibiotics with current WBC trending down per Dr. Lawrence. CRP trending down.   - Repeat Blood cultures NTD.   - Low residue diet.     CDIF.   - Oral vanco through 9/6 to complete 10 day course.     Acute on Chronic Anemia. Likely acute exacerbation in setting of colitis. ASA and Heparin gtt held since 8/22. Received 1 unit PRBC 8/29. Lisinopril held in setting of anemia exacerbation. Per CRS, no scope due to risk of disruption anastomosis. Peripheral smear with no acute concerns 8/31.  - Hgb stable at 8.1  - Continue Epo per renal.     Leukopenia. WBC trending down at 2 with ANC 1.6. Peripheral smear with no acute concerns 8/31.  - Discussed with ID. No evidence of fever, infectious symptoms, and CRP trending down.   - Blood cultures remain NTD.   - Repeat CBC with diff qMWF.   - Continue Valcyte, if WBC continues to trend down consider cessation.   - Continue reduced dose of Cellcept 500 mg po BID.   - Histoplasmosis urine antigen pending.     Right internal jugular thrombus.   - Eliquis remains on hold given acute bleed and low Hgb.     S/p OHT. 6/14/18 secondary to NICM. RHC  8/24/18 consistent with mRA-1, RV-24/1, mPA-17, mPAW-3, BECKA CO-7.5, and BECKA CI-3.8 with biopsy OR, AMR1 with positive CD4 staining. Echo 7/20/18 with normal graft function.   Serostatus: HSV1-, HSV1+, CMV D+/R-, EBV D?/R+, VZV+  Immunosuppression: Cellcept 500 mg po BID, Prednisone 10 mg in AM and 5 mg at HS. Tac level 11.8 9/3 with goal 6/8. Decreased Tac to 2.5 mg po BID. Repeat level in AM.   Prophylaxis:Nystatin. Pentamidine last given 8/17 with repeat dose recommended 9/14. Valcyte 450 mg po every Tuesday and Saturday.  - Continue Lipitor.   - ASA remains on hold as above.   - next biopsy and RHC due 9/7/18.    HTN. Lisinopril held 8/6/18 due to concern for anemia exacerbation.   - BP elevated. Continue Hydralazine 75 mg po QID.   - Hydralazine 10 mg IV prn SBP > 180 and BP > 110.   - Norvasc 5 mg po daily initiated.     ESRD on HD. Remains inactive on renal transplant list.   - Appreciate Nephrology consult with HD every TTS.     Non-Severe Protein Calorie Malnutrition. Albumin 1.9 9/3/18.  - Appreciate Nutrition consult.   - oral intake improving.     Chronic Medical Conditions:  Pseudomonas bacteremia secondary to necrotic oral ulcer.   Pulmonary Nodules. Stable RLL nodules measuring 5 mm and 3 mm per CT 8/12 and 8/29. Repeat Chest CT due 9/16.  DM Type II. Lantus and Medium sliding scale insulin.     FEN: Low fiber diet   PROPHY:  Held in setting of bleed. Protonix.   DISPO:  Ok for transfer to TCU when bed available.   CODE STATUS: Full Code   ================================================================    Interval History/ROS: He complains of abdominal distention, UE edema, and LE edema. He denies fever, chills, chest pain, palpitations, cough, MEDAE, PND, nausea, vomiting, diarrhea, hematochezia, and hematemesis. His oral intake is improving and he is tolerating therapy at this time.     Last 24 hr care team notes reviewed.   ROS:  4 point ROS including Respiratory, CV, GI and , other than that  "noted in the HPI, is negative.     Medications: Reviewed in EPIC.     Physical Exam:   /90  Pulse 106  Temp 98  F (36.7  C) (Oral)  Resp 16  Ht 1.753 m (5' 9\")  Wt 82.1 kg (180 lb 14.4 oz)  SpO2 100%  BMI 26.71 kg/m2  GENERAL: Appears alert and oriented times three.   HEENT: Eye symmetrical and free of discharge bilaterally. Mucous membranes moist and without lesions.  NECK: Supple and without lymphadenopathy. JVD to jaw.   CV: RRR, S1S2 present without murmur, rub, or gallop.   RESPIRATORY: Respirations regular, even, and unlabored. Lungs CTA throughout.   GI: Soft and mildly distended with normoactive bowel sounds present in all quadrants. No tenderness, rebound, guarding. No organomegaly.   EXTREMITIES: +1 UE and LE peripheral edema. 2+ bilateral pedal pulses.   NEUROLOGIC: Alert and orientated x 3. CN II-XII grossly intact. No focal deficits.   MUSCULOSKELETAL: No joint swelling or tenderness.   SKIN: No jaundice. No rashes or lesions.     Data:  CMP  Recent Labs  Lab 09/04/18  0712 09/03/18  0616 09/02/18  0634 09/01/18  0632 08/31/18  0646 08/30/18  0724 08/29/18  0622    136 137 134 134 131* 133   POTASSIUM 4.3 4.0 3.8 4.0 3.9 3.9 4.3   CHLORIDE 102 100 100 98 98 97 97   CO2 26 28 28 27 29 24 26   ANIONGAP 8 8 9 10 7 10 10   * 100* 112* 136* 121* 111* 214*   BUN 29 22 17 30 24 47* 40*   CR 5.95* 4.67* 3.59* 5.04* 3.81* 5.29* 4.09*   GFRESTIMATED 10* 13* 17* 12* 16* 11* 15*   GFRESTBLACK 12* 15* 21* 14* 19* 13* 18*   TRINI 8.0* 7.8* 8.0* 7.9* 7.4* 7.6* 7.6*   MAG  --  1.8  --   --  1.6 1.8 1.9   PHOS  --  2.2*  --   --  2.6  --  3.1   PROTTOTAL  --  5.2*  --   --  4.7*  --   --    ALBUMIN  --  1.9*  --   --  1.6*  --   --    BILITOTAL  --  0.5  --   --  0.5  --   --    ALKPHOS  --  138  --   --  133  --   --    AST  --  17  --   --  17  --   --    ALT  --  14  --   --  14  --   --      CBC  Recent Labs  Lab 09/04/18  0712 09/03/18  0616 09/02/18  0634 09/01/18  0632   WBC 2.0* 2.4* 3.2* " 2.9*   RBC 2.59* 2.60* 2.57* 2.21*   HGB 8.1* 8.2* 8.0* 6.9*   HCT 26.2* 25.5* 25.1* 21.6*   * 98 98 98   MCH 31.3 31.5 31.1 31.2   MCHC 30.9* 32.2 31.9 31.9   RDW 21.3* 20.4* 20.2* 19.5*   * 96* 61* 46*     INR  Recent Labs  Lab 09/03/18  0616   INR 1.05       Patient discussed with Dr. Toribio.      Jennifer Dean [facility]  9/4/2018

## 2018-09-04 NOTE — PROGRESS NOTES
CLINICAL NUTRITION SERVICES    Kcal counts:  9/1   803 kcals and 24 g protein (Two meal/s and 50% of Boost Plus supplement/s recorded)  9/2   1068 kcals and 24 g protein (Three meal/s and no supplement/s recorded)  9/3   2027 kcals and 66 g protein (Three meal/s and no supplement/s recorded)  * Pt consumed a three-day average of 1299 kcals and 38 g protein daily. Not meeting estimated needs of 5436-0596 kcals/day (25 - 30 kcals/kg) and  grams protein/day (1.2 - 1.8 grams of pro/kg). However, oral intake is improving. Pt is on a low fiber diet. Has an order for chocolate Boost Plus at HS and an order to offer other oral supplements with meals.      INTERVENTIONS:  Implementation:  Collaboration with other providers: Discussed pt with team.     Follow up/Monitoring:  Will continue to follow pt.    Mary Silva, MS, RD, LD, [facility]   6C Pgr: [phone]

## 2018-09-04 NOTE — PROGRESS NOTES
Colorectal Surgery Progress Note  POD#23    Subjective: No acute events overnight. Spending time ambulating. Has had some sensation as if he had a need to defecate. Reinforced that he may still pass small remaining stoo or mucus from rectal pouch. AM labs pending    Vitals:  Vitals:    09/03/18 2010 09/03/18 2013 09/03/18 2330 09/04/18 0345   BP:  (!) 150/104 (!) 157/98    BP Location: Left arm Left arm Left arm    Pulse:       Resp: 18 18 18 18   Temp:  98.4  F (36.9  C) 98.4  F (36.9  C)    TempSrc:  Axillary Oral    SpO2:  100% 100%    Weight:    82.1 kg (180 lb 14.4 oz)   Height:           I/O:  I/O last 3 completed shifts:  In: 610 [P.O.:610]  Out: 230 [Urine:80; Stool:150]    Physical Exam:  Gen: AAOx3, NAD, lying in bed  Pulm: Non-labored breathing  Abd: Soft, non-distended, non-tender   Incision C/D/I with dermabond    Stoma pink and viable with brown semi-solid stool and gas in bag  Ext:  Warm and well-perfused    BMP    Recent Labs  Lab 09/03/18  0616 09/02/18  0634 09/01/18  0632 08/31/18  0646 08/30/18  0724 08/29/18  0622    137 134 134 131* 133   POTASSIUM 4.0 3.8 4.0 3.9 3.9 4.3   CHLORIDE 100 100 98 98 97 97   CO2 28 28 27 29 24 26   BUN 22 17 30 24 47* 40*   CR 4.67* 3.59* 5.04* 3.81* 5.29* 4.09*   * 112* 136* 121* 111* 214*   MAG 1.8  --   --  1.6 1.8 1.9   PHOS 2.2*  --   --  2.6  --  3.1     CBC    Recent Labs  Lab 09/03/18  0616 09/02/18  0634 09/01/18  0632 08/31/18  0646   WBC 2.4* 3.2* 2.9* 3.2*   HGB 8.2* 8.0* 6.9* 7.1*   HCT 25.5* 25.1* 21.6* 21.5*   PLT 96* 61* 46* 43*         ASSESSMENT: This is a 63 year old male with NICM s/p heart transplant 6/2018 on immunosuppressants, with ESRD on HD, R internal jugular thrombus previously on apixaban, T2DM, hx pseudomonal bacteremia admitted to cards service for painless BRBPR. Found to have diverticulitis with 7.8 cm pericolonic abscess without safe window for IR drain. 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   Post-op course complicated by ileus, and concern for bleeding after initiation of anticoagulation, with slowly down-trending Hgb. Has received transfusion of PRBC x4 since 8/22, Hgb now stable. Ileus resolved with return of bowel function. Issues with dizziness resolving with less fluid removed during dialysis. Tolerating low residue diet, taking PO pain medications, anticipating discharge when bed available and blood counts stable. Appreciate care per primary team.     - Continue low residue diet and encourage supplements as tolerated  - Continue Miralax daily  - Hgb stable. No indication for endoscopy or further imaging at this time. Holding anticoagulation, management per primary team. On epogen  - Will follow up outpatient with Dr. Tran      Colorectal Surgery will continue to follow.     Sp Dunlap MD   PGY-3, GEN SURG

## 2018-09-04 NOTE — PROGRESS NOTES
Calorie Count  Intake recorded for: 9/3/2018    Kcals: 2027  Protein: 66g  # Meals Recorded:  3 meals  (first - 100% oatmeal, sausage nathaniel)      (second - 100% bratwurst, cupcake, potato salad)      (third - 75% bratwurst, potato salad, cupcake, 8oz whole milk)  # Supplements Recorded: 0

## 2018-09-04 NOTE — DISCHARGE INSTRUCTIONS
[facility]     Name: Murray Nicholson  Date: 9/4/18    To order your ostomy supplies    The ostomy Supplier needs this supply list  to process your order. You will need to fax/deliver this list, along with your Insurance information. Your home care nurse can assist with this process.  List of Ostomy Distributors      [facility]  Ph. [phone] Fax # [phone]  [employer]   Ph. [phone]  [facility]   Ph. [phone]  [facility]   Ph. [phone]  Or Call your insurance provider for their preferred supplier    Your Medical Supplier will need your surgeon's name, phone and fax number    Clinic:                     Phone                            Fax  Colorectal Surgery:    [phone]  Verbal Order for ostomy supplies for 1 Month per:  Karly Jon RN CWON       Authorizing MD: Dr. Tran  Change pouch :                            Three times a week  Quantity of pouches-#15/mo    Request the following supplies:      Shashi    1 piece flat fecal with filter #8331    or                Coloplast   Sensura Uriel  1 piece transparent flat pouch with filter #72785                              Accessories  2  barrier ring #2045     Adapt paste #06109     Adapt powder #7906    No sting film barrier # 3345                          Adapt odor eliminator and lubricant 236ml bottle # [id number]     M-9 Spray room deodorizer #6710                           Brava elastic barrier strips curved # [id number]                                                         Change your pouch twice a week, more often if leaking.    If you are cutting a hole in the wafer of your pouch, recheck stoma size and adjust pouch opening as needed every week    . Call the Ostomy Nurse at [facility]       [address] [place] [address], [place] : [phone]   Schedule a follow-up visit in 4 to 6 weeks after your surgery,  sooner if having problems Bring a complete set of pouch-changing supplies to this visit      Problems you should Report  - The stoma turns blue or darker in color.  - Cuts or sores around the stoma.  - Red, raw or painful skin around the stoma.  - Any bulging of the skin around the stoma.  - A pouch that leaks every day.  - Problems making the right size hole in the pouch wafer.    Please call with any questions or concerns.

## 2018-09-04 NOTE — PROGRESS NOTES
"  WOC Nurse Inpatient [facility]   WOC Nurse Inpatient Adult     Follow Up Assessment   Assessment of new end Colostomy Stoma complication(s) none   Mucocutaneous junction; separation non gapping at 3 and 9 o'clock   Peristomal complication(s) none   Pouch wear time:3-4 days,   Following today's visit:Patient /   is  able to demonstrate;       1. How to empty their pouch? yes      2. How to change their pouch?  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  Current Diet/Nutrition:   Active Diet Order      Low Fiber Diet      NPO per Anesthesia Guidelines for Procedure/Surgery Except for: Meds   TPN no   I/O last 3 completed shifts:  In: 610 [P.O.:610]  Out: 2750 [Other:2600; Stool:150]  Labs:    Recent Labs  Lab 09/04/18  0712 09/03/18  0616   ALBUMIN  --  1.9*   HGB 8.1* 8.2*   INR  --  1.05   WBC 2.0* 2.4*   CRP  --  12.0*        Physical Exam:  Current pouching system:Shashi 1 piece flat   Reason for pouch change today: ostomy education and routine schedule  Stoma appearance: red, oval and moist  Stoma size; 1 5/8\" with good protrusion ,    Peristomal skin: intact  Stoma output :brown and pasty   Abdominal  Assessment  firm , NG still in place? No  Surgical Site: open to air  Pain: Dull ache  Is patient still on a PCA No    Interventions:  Patient's chart evaluated.  Focus of today's visit: refitting of appliance, pouch change return demonstration and discharge instructions   Preparation for discharge: Supplies ordered, Completed supply list, Registered for samples from , Prescriptions or note left on chart for MD to sign/complete, Discussed making a WOC Nurse outpatient appointment upon discharge, Discussed when to follow up with a WOC Nurse in the future and Discussed how/ where to order supplies  Participant of teaching session today patient "   Orders: Written  Change made with ostomy management today: Yes  Patient/family: performed with verbal cues  Supplies:Ordered barrier ring and pouches      Plan:  Learning needs: pouch change practice     Will be discharging to TCU     Discussed plan of care with Patient and Nurse  Nursing to notify the Provider(s) and re-consult the WOC Nurse if new ostomy concerns or discharge planned before next planned WOC visit.    WOC Nurse will return: Friday   Face to face time: 45 minutes

## 2018-09-04 NOTE — PROGRESS NOTES
[facility]  Transplant Infectious Disease Progress Note -- Sign Off    Patient:  Murray Nicholson, Date of birth 1955, Medical record number [id number]  Date of Visit:  09/04/2018         Assessment and Recommendations:   Recommendations:  - Continue to not resume antibiotics at this time, since (off them for nearly 1.5 weeks) he has no fever, increased abdominal symptoms, rising inflammatory markers, or new abdominal CT findings (on the 8/30/18 CT scan) that might suggest of a new infectious focus.  - Send serial serum CRPs every other day x  2 - 3 more as an additional early-warning monitor for any emergent difficulty.  - Would monitor the ANC about thrice weekly while still on both Valcyte (through 9/14/18) and mycophenolate and consider giving one or more doses of G-CSF if the ANC drops as low as 1.0.  (Alternatively, could hold the Valcyte prematurely, although we favor using G-CSF.)  - Finish treatment for C difficile with PO vancomycin on 9/6/18 (for 10 days following cessation of broad-spectrum antibiotics)  - Continue valganciclovir at prophylaxis dosing until 3 months post transplant (9/14/18).  - Monitor frequent blood CMV PCR viral loads once his Valcyte prophylaxis is discontinued.  - Continue pentamidine for PJP prophylaxis (next due 9/17/18).  If he has a stable decent WBC moving forward, however, it would be then preferable to re-start TMP-SMX at that point.  - Await the pending 9/3/18 Histoplasma urine Ag assay (for pulmonary nodule that has increased in size).  - At present, there does not seem to be any reason to plan a post-discharge outpatient Transplant ID Clinic appointment, but we could see him as needed in clinic if that is deemed to be needed.    Case discussed with the Cardiology service.  With the above recommendations, Transplant ID will sign off now, but please page me if additional ID questions or concerns arise over the next few weeks.    Vinayak Lawrence,  MD  Pager [phone]     Assessment:   A 63-year-old gentleman with history of ischemic/valvular cardiomyopathy s/p OHT on 6/14/18 (induction with solumedrol and maintenance with tacrolimus, MMF, and prednisone), ESRD on TThSa hemodialysis currently listed for kidney transplantation, history of polymicrobial peritonitis with Candida glabrata when previously on peritoneal dialysis in 1/2018, recent Pseudomonas aeruginosa bacteremia on 7/26/18 with retained dialysis catheter, hypertension, hyperlipidemia, and type 2 diabetes who was admitted on 8/12 with subjective fevers, severe lower abdominal pain, and bloody stools.  He was diagnosed as C difficile positive and treated with oral vancomycin.  He is s/p laparoscopic abscess drainage, sigmoidectomy, and partial small bowel resection on 8/14/18, with polymicrobial operative cultures growing Citrobacter freundii complex, VRE, Klebsiella pneumoniae, Pseudomonas aeruginosa and Veillonella, for which he was treated with broad-spectrum antibiotic therapy through 8/27/18.  Now, off all antibiotics (except oral vancomycin) since then, he continues to slowly but steadily improve overall without any evidence of recurrent infection.     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  Throughout this time period, he had been on broad-spectrum coverage with cefepime, which should have suppressed many enteric organisms, although leaving holes in the anaerobic coverage that would explain progression of his symptoms. The underlying cause of the lesions seemed likely to be diverticulosis.  He  was taken to the OR on 8/14/18 for laparoscopic drainage of the pericolonic and perirectal abscesses, as well as sigmoidectomy, end colostomy, and partial small bowel resection. Fluid cultures obtained in the OR on 8/14/18 grew Citrobacter freundii complex, VRE, Klebsiella pneumoniae, Pseudomonas aeruginosa, and Veillonella. He has remained steadily afebrile post-operatively, with slow but steady subjective improvement in his abdominal pain, decreasing serum CRP, and slow improvement in his overall strength, gut function, and constitution.  He completed two weeks of broad-spectrum antibiotics (timed from the date of surgery) on 8/27/18 and has shown no signs of recurrent infection since then while off all systemic antibiotics.    # Gradually worsening neutropenia:  His prior anemia and thrombocytopenia seem to be improving, so at this point, he simply has a neutropenic leukopenia.  This monocytopenic picture is highly consistent with the simultaneous administration of prophylactic Valcyte plus mycophenolate immunosuppression.  Per usual protocol, he is presently scheduled to continue on the Valcyte until three months post-transplant, 9/14/18.  If necessary, that could be discontinued early with weekly monitoring of the plasma CMV PCR viral load afterwards.  Alternatively, and sometimes preferably, his ANC can be monitored and if it drops < 1.0, one or more G-CSF doses could be given.  It is quite unlikely that the leukopenia is due to resurgent infection and additional diagnostic evaluation of the leukopenia would not seem necessary now (since the two drug combination is so strongly the likely culprit).    # Resolved C difficile-positive diarrhea:  Commenced therapy with oral vancomycin on 8/13/18 with the plan to continue until ten days after completion of his broad-spectrum antibiotic course for the intraabdominal infection (i.e., through 9/6/18)     #  Pseudomonas aeruginosa bacteremia associated with a retained  hemodialysis catheter: History of positive blood cultures from 7/26/18 but, by report, there have not been any more positive cultures since then.  He received a long course of antibiotics that are active against Pseudomonas (cefepime through 8/23/18, then ciprofloxacin through 8/27/18).  There is still a tiny residual risk that he may have recrudescence of bacteremia as result of biofilm formation on the line that was retained, so we should repeat blood cultures with any fevers.     # New small (3 mm and 5 mm) pulmonary nodules: An incidentally discovered 3 mm nodule on 8/18/18 chest CT had increased in size to 6 mm on the most recent 8/29/18 CT enterography.  Serum CrAg was negative on 8/31/18.  A urine Histoplasma antigen assay is pending from 9/3/18.  Recommend following up in Pulmonary Nodule Clinic, and also with a repeat chest CT in about two to three months.    # Resolved oral ulcer: Viral testing during the previous hospitalization was negative.   A woundswab culture grew Pseudomonas in addition to oral tiffany. The lesion decreased in size since earlier in this hospitalization.     Other ID issues:  - Viral serostatus & prophylaxis: HSV1-, HSV2+, CMV D+/R-, EBV D?/R+, VZV+.  On Valcyte.  - PJP: TMP-SMX on hold for leukopenia.  Received inhaled pentamidine 8/17/18.  - Isolation status: Enteric isolation for C difficile carriage.        Interval History:   Mr. Nicholson remains consistently afebrile (t max 99.2 degrees) without chills or sweats off all systemic antibiotics since 8/27/18.  He is still on oral vancomycin through 9/6/18 and Valcyte and pentamidine prophylaxis.  He continues to generally feel well at rest and is slowly improving in strength and overall, although still feels quite fatigued with hemodialysis (obtaining today).  He ambulates short distances and is expected to discharge to the [facility] TCU soon for additional reconditioning therapy.  His abdominal aches continue to slowly resolve  (and are mostly gone) and he is eating more.  He has appropriate ostomy output appears normal.  He lacks nausea, EENT symptoms, rash, cough, dyspnea, chest pain, headache, or other new complaints today.  His peripheral WBC continues to slowly drift down on ongoing Valcyte / MMF (with an ANC of 1.6 today), but his hemoglobin is stable and his platelets have risen over the past several days, making this highly-likely a drug-drug-induced leukopenia / neutropenia.  A repeat abdominal CT scan on 8/29/18 showed no new likely infectious foci, although the RLL pulmonary nodule had increased to 6 mm in size (versus 8/18/18).  An 8/31/18 serum CRAG was negative and a urinary Histoplasma antigen assay from 9/3/18 is pending.  Repeat plasma CMV PCR was undetectable on 8/30/18.    Transplants:  6/14/2018 (Heart), Postoperative day:  82.  Coordinator Britni Mcknight    Immunosuppression: Mycophenolate 500 mg twice daily, tacrolimus 3mg BID (Goal 6-8 due to infection).  Prednisone 10/5 mg         Current Medications & Allergies:       atorvastatin  20 mg Oral QPM     fluticasone  1-2 spray Both Nostrils Daily     hydrALAZINE  75 mg Oral 4x Daily     insulin aspart  1-6 Units Subcutaneous TID w/meals     insulin aspart  1-5 Units Subcutaneous At Bedtime     insulin glargine  20 Units Subcutaneous QAM AC     mycophenolate  500 mg Oral BID     NEPHROCAPS  1 capsule Oral Daily     nystatin  1,000,000 Units Swish & Swallow 4x Daily     pantoprazole  40 mg Oral QAM AC     predniSONE  10 mg Oral Daily     predniSONE  5 mg Oral Daily     sodium chloride (PF)  3 mL Intracatheter Q8H     tacrolimus  2.5 mg Oral BID IS     triamcinolone   Topical BID     valGANciclovir  450 mg Oral Once per day on Tue Sat     vancomycin  125 mg Oral 4x Daily     Infusions/Drips:    IV fluid REPLACEMENT ONLY       Allergies   Allergen Reactions     Norco [Hydrocodone-Acetaminophen] Nausea and Vomiting     Cats      Throat tightness     Isosorbide  Other (See Comments)     hypotension     Penicillins Hives     Seasonal Allergies      rhinitis     Shrimp      Throat closes           Review of Systems:   As per Interval History.  Complete ROS is otherwise negative.         Physical Exam:   Vitals were reviewed and are stable.  Vital sign ranges over the past 24 hours:  Temp:  [98  F (36.7  C)-98.4  F (36.9  C)] 98.2  F (36.8  C)  Heart Rate:  [88-99] 98  Resp:  [16-20] 17  BP: (139-181)/() 139/91  Cuff Mean (mmHg):  [123-128] 128  SpO2:  [100 %] 100 %    Intake/Output Summary (Last 24 hours) at 09/04/18 1328  Last data filed at 09/04/18 1245   Gross per 24 hour   Intake              730 ml   Output             2750 ml   Net            -2020 ml     Physical Examination:  GENERAL: A personable, conversant, WDWN 63 year old man in NAD.  EYES:  EOMI, anicteric sclerae  ENT:  No otorrhea.  No anterior oral lesion.  NECK:  Supple.  LUNGS:  Clear to auscultation bilaterally  CARDIOVASCULAR:  Regular rate and rhythm with no murmur.  Well-healed surgical scars.    ABDOMEN: Increased bowel sounds.  Periumbilical and infraumbilical incisional mild tenderness persists, otherwise nontender superiorly.   LLQ colostomy with clean stoma and darker, normal-colored, semi-formed stool.  :  No Dobbins.   SKIN:  No acute rash or lesion.  NEUROLOGIC:  Alert, oriented x3, moves extremities x 4.         Laboratory Data:     CD4 counts  No results found for: ACD4  Inflammatory Markers    Recent Labs   Lab Test  09/03/18   0616  09/01/18   0632  08/17/18   0644  08/14/18   0620  08/12/18   0230  07/26/18   1732  07/02/18   0601  06/30/18   0852   01/13/16   1337   SED   --    --    --    --   72*   --    --    --    --   80*   CRP  12.0*  15.0*  270.0*  230.0*  76.0*  270.0*  7.3  12.0*   < >   --     < > = values in this interval not displayed.     Immune Globulin Studies    Recent Labs   Lab Test  05/19/15   1000   IGG  1380   IGM  108   IGA  203     Metabolic Studies        Recent Labs   Lab Test  09/04/18   0712  09/03/18   0616  09/02/18   0634  09/01/18   0632  08/31/18   0646  08/30/18   0724  08/29/18   0622   08/27/18   0644  08/26/18   0653   08/24/18   0634   08/22/18   0741   NA  136  136  137  134  134  131*  133   < >  129*  130*   < >  127*   < >  128*   POTASSIUM  4.3  4.0  3.8  4.0  3.9  3.9  4.3   < >  4.7  4.3   < >  3.9   < >  3.3*   CHLORIDE  102  100  100  98  98  97  97   < >  94  94   < >  91*   < >  93*   CO2  26  28  28  27  29  24  26   < >  24  26   < >  24   < >  24   ANIONGAP  8  8  9  10  7  10  10   < >  11  11   < >  11   < >  10   BUN  29  22  17  30  24  47*  40*   < >  56*  38*   < >  49*   < >  46*   CR  5.95*  4.67*  3.59*  5.04*  3.81*  5.29*  4.09*   < >  4.66*  3.40*   < >  3.36*   < >  3.22*   GFRESTIMATED  10*  13*  17*  12*  16*  11*  15*   < >  13*  18*   < >  19*   < >  20*   GLC  150*  100*  112*  136*  121*  111*  214*   < >  185*  165*   < >  202*   < >  256*   TRINI  8.0*  7.8*  8.0*  7.9*  7.4*  7.6*  7.6*   < >  7.9*  8.0*   < >  7.9*   < >  7.3*   PHOS   --   2.2*   --    --   2.6   --   3.1   --   3.1   --    --   3.6   --   2.7   MAG   --   1.8   --    --   1.6  1.8  1.9   --   2.3  1.9   < >  1.8   < >  1.8    < > = values in this interval not displayed.     Hepatic Studies    Recent Labs   Lab Test  09/03/18   0616  08/31/18   0646  08/27/18   0644  08/20/18   1105  08/14/18   0620  08/13/18   0618   BILITOTAL  0.5  0.5  0.5  0.4  0.3  0.3   ALKPHOS  138  133  133  170*  110  99   ALBUMIN  1.9*  1.6*  1.8*  1.7*  1.6*  1.6*   AST  17  17  16  41  29  28   ALT  14  14  13  18  17  17     Hematology Studies      Recent Labs   Lab Test  09/04/18   0712  09/03/18   0616  09/02/18   0634  09/01/18   0632  08/31/18   0646  08/30/18   0724   WBC  2.0*  2.4*  3.2*  2.9*  3.2*  3.8*   ANEU  1.6  1.9  2.4  2.3  2.4  2.9   ALYM  0.3*  0.3*  0.4*  0.3*  0.3*  0.4*   DK  0.1  0.1  0.2  0.2  0.4  0.5   AEOS  0.1  0.1  0.1  0.1  0.0  0.1   HGB   8.1*  8.2*  8.0*  6.9*  7.1*  7.3*   HCT  26.2*  25.5*  25.1*  21.6*  21.5*  22.3*   MCV  101*  98  98  98  96  96   PLT  115*  96*  61*  46*  43*  51*     Clotting Studies    Recent Labs   Lab Test  09/03/18   0616  08/27/18   0644  08/20/18   1105  08/12/18   0834   06/29/18   0545  06/28/18   0554  06/27/18   0629   INR  1.05  1.07  0.99  1.31*   < >   --   1.15*  1.10   PTT   --    --    --   37   --   30  29  29    < > = values in this interval not displayed.     Pathology   8/14/18:   A. SMALL BOWEL WITH FISTULA, RESECTION:   - Small bowel wall with fistulous tract lined by inflamed granulation  tissue   - Margins viable   B. SIGMOID COLON, SIGMOIDECTOMY:   - Colonic wall with diverticulosis and associated perforations lined by  acute and chronic inflammation,  foreign body giant cell reaction and granulation tissue formation   - Margins viable     Microbiology    Abdominal abscess fluid    8/14 Light growth of citrobacter freundii complex, VRE, Klebsiella pneumoniae, and Pseudomonas aeruginosa. Veillonella, mixed tiffany    Blood  6/14/18 dialysis catheter tip 2 strains CoNS    7/26/18 x2 from [facility] Pseudomonas aeruginosa (Microbiology report obtained from [facility] and copy placed in paper chart.  Pan-sensitive).    7/26/18 x2 [facility] negative    7/28/18 x2 negative    8/10/18 ×2 negative    8/12/18 ×3 negative    8/31/18 x4 negative    9/2/18 x2 NGTD      Wound                         7/26/18: Mouth wound with pansensitive pseudomonas aeruginosa and normal tiffany                         8/12/18 Mouth ulcer     Peritoneal fluid                         1/7/18 13 yellow fluid with 4736 WBCs, 80% neutrophils.  Gram stain with no organisms and many WBCs.  Culture with Candida glabrata and Bacteroides caccae.                         1/15/18: Peritoneal fluid with 4256 WBCs, 91% neutrophils.  Gram stain with many WBCs and no organisms seen.  Culture with Staphylococcus epidermidis, Candida glabrata    Enteric  LOGAN   18 negative  Giardia ag   18 negative  C diff   18 positive  Cryptosporidium ag   18 negative  Microsporidium ag   18 negative    Ur Histoplasma Ag pending 9/3/18  Cryptococcus Ag negative 18     Virology  CMV blood PCR                         18 negative                         18 negative  EBV blood PCR                         18 negative  Parvovirus blood PCR                          18 negative  VZV blood PCR                         18 negative     Imagin/29 CT enterography  1. Redemonstration of the enhancing lesion in the left lower quadrant small bowel measuring up to 1.4 cm. This may represent a small polyp or GIST.  2. Interval improvement in the previously seen dilation of the small bowel. Stable postoperative changes of partial sigmoidectomy and left lower quadrant colostomy.  3. Increased size of a 6 mm right lower lobe pulmonary nodule, which previously measured 3 mm. The relatively short period of time and significant increase in size favors an infectious or inflammatory etiology, however this is not definitive. Additional sub-5 mm pulmonary nodules are unchanged. Recommend three-month follow-up CT.  4. Multiple unchanged intraductal pancreatic mucinous neoplasms.  5. Decreased small left pleural effusion.

## 2018-09-04 NOTE — PLAN OF CARE
"Problem: Patient Care Overview  Goal: Plan of Care/Patient Progress Review  Outcome: No Change   09/04/18 1341   OTHER   Plan Of Care Reviewed With patient   Plan of Care Review   Progress no change     BP (!) 139/91  Pulse 106  Temp 98.2  F (36.8  C) (Oral)  Resp 17  Ht 1.753 m (5' 9\")  Wt 82.1 kg (180 lb 14.4 oz)  SpO2 100%  BMI 26.71 kg/m2    A&Ox4. A/VSS ex episode of elevated BP, resolved with prn hydralazine IV in dialysis. Pt went to HD today, 2.6L of fluid removed. Continue with low residue diet, colostomy in place, +output this am. Hgb 8.1. Social work is working on placement into rehab facility, no bed available for today. Continue with POC.       "

## 2018-09-04 NOTE — PLAN OF CARE
Problem: Patient Care Overview  Goal: Plan of Care/Patient Progress Review  Discharge Planner PT   Patient plan for discharge: rehab  Current status: patient completes bed mobility from elevated HOB independently. Sit <> stand transfer from bed indep with FWW, needs Ilsa for lower surface (chair). Pt ambulates with  feet x2 mod I with standing breaks. Pt has multiple sets of stairs to access home impacting dc home.   Barriers to return to prior living situation: stairs, level of assist, medical stability  Recommendations for discharge: TCU  Rationale for recommendations: pt would benefit from TCU to progress safety and indep with functional mobility       Entered by: Scarlet Tavarez 09/04/2018 2:37 PM

## 2018-09-04 NOTE — PLAN OF CARE
Problem: Surgery Nonspecified (Adult)  Goal: Signs and Symptoms of Listed Potential Problems Will be Absent, Minimized or Managed (Surgery Nonspecified)  Signs and symptoms of listed potential problems will be absent, minimized or managed by discharge/transition of care (reference Surgery Nonspecified (Adult) CPG).   Outcome: No Change  D/He hopes to get a good night sleep and hopes he gets to go to rehab today. Ostomy intact  P/possible dc in am  A/appeared to sleep

## 2018-09-04 NOTE — PROGRESS NOTES
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 Right TDC, EDW increased to 79.5 kg   - Will continue TTS schedule   - No heparin   - list for kidney transplant with wait time dating from 3/24/16 - currently inactive as too ill to undergo kidney transplantation surgery, he will not get organ offers but he will continue to accrue wait time      Volume status: EDW 79.5 kg. Though he has edema, his RHC on 8/24 showed low filling pressures (RA 1).    - Continue pre run weights, standing   - Strict I/O  -  EDW 79.5 kg based on RHC from 8/24/18          Hyponatremia -due to excess water intake in setting of kidneys that are not able to excrete water, minimize water intake      Hypertension: 130-150's  Could consider ACEi though has side effect of anemia and not ideal in this patient with current epo resistance and ongoing requirement of PRBC.    - Per cards, on hydralazine 75 mg tid         Transplant IS regimen: Tac, MMF, Pred      BMD: Ca 7.6, alb 1.8, phos 3.1    GIB: resolved  Thrombocytopenia: improving  -HIT panel neg, lysis labs neg  - workup per primary team    Anemia: hgb 8.1; with ongoing transfusion needs.  This is out of proportion to what is typically seen in ESRD and he is now on max dose of epogen.  - continue epogen to 10,000 units  - transfuse pRBC per primary team           Pericolonic abscess, small perircal abscess: Underwent I/D anorectal abscess on  "8/13 and  Sigmoidectomy/end colostomy on 8/14.      C diff: on PO vanc      Lung nodules - New Right lower lobe pulmonary nodules seen on CT this admission. ID recommending close f/u.         Recommendations were communicated to primary team via progress note and in person    Kristi Armas PA-C  Division of Kidney Disease  Pager [phone]          Interval History :   Seen on dialysis, stable run to dry weight. Feeling better today, appetite ok and denies CP, SOB, n/v. Is doing a fair amount of rehab.      Review of Systems:   4 point ROS negative other than as noted above.    Physical Exam:   I/O last 3 completed shifts:  In: 610 [P.O.:610]  Out: 230 [Urine:80; Stool:150]   BP (!) 164/111  Pulse 106  Temp 98  F (36.7  C) (Oral)  Resp 18  Ht 1.753 m (5' 9\")  Wt 82.1 kg (180 lb 14.4 oz)  SpO2 100%  BMI 26.71 kg/m2     GENERAL APPEARANCE: alert, NAD  EYES: no scleral icterus, pupils equal  Pulmonary: normal work of breathing  CV: WWP   - Edema 1-2+ upper and lower extremity edema  NEURO: alert/oriented, face symmetric and speech is fluent  ACCESS: Right TDC    Labs:   All labs reviewed by me  Electrolytes/Renal -   Recent Labs   Lab Test  09/04/18   0712  09/03/18   0616  09/02/18   0634   08/31/18   0646  08/30/18   0724  08/29/18   0622   NA  136  136  137   < >  134  131*  133   POTASSIUM  4.3  4.0  3.8   < >  3.9  3.9  4.3   CHLORIDE  102  100  100   < >  98  97  97   CO2  26  28  28   < >  29  24  26   BUN  29  22  17   < >  24  47*  40*   CR  5.95*  4.67*  3.59*   < >  3.81*  5.29*  4.09*   GLC  150*  100*  112*   < >  121*  111*  214*   TRINI  8.0*  7.8*  8.0*   < >  7.4*  7.6*  7.6*   MAG   --   1.8   --    --   1.6  1.8  1.9   PHOS   --   2.2*   --    --   2.6   --   3.1    < > = values in this interval not displayed.       CBC -   Recent Labs   Lab Test  09/04/18   0712  09/03/18   0616  09/02/18   0634   WBC  2.0*  2.4*  3.2*   HGB  8.1*  8.2*  8.0*   PLT  115*  96*  61*       LFTs -   Recent Labs "   Lab Test  09/03/18   0616  08/31/18   0646  08/27/18   0644   ALKPHOS  138  133  133   BILITOTAL  0.5  0.5  0.5   ALT  14  14  13   AST  17  17  16   PROTTOTAL  5.2*  4.7*  5.2*   ALBUMIN  1.9*  1.6*  1.8*       Iron Panel -   Recent Labs   Lab Test  07/10/18   0711  05/08/18   0954  07/19/17   1306   IRON  66  58  46   IRONSAT  28  27  18   ALBERTINA  771*  621*  369       Current Medications:    atorvastatin  20 mg Oral QPM     fluticasone  1-2 spray Both Nostrils Daily     hydrALAZINE  75 mg Oral 4x Daily     insulin aspart  1-6 Units Subcutaneous TID w/meals     insulin aspart  1-5 Units Subcutaneous At Bedtime     insulin glargine  20 Units Subcutaneous QAM AC     mycophenolate  500 mg Oral BID     NEPHROCAPS  1 capsule Oral Daily     - MEDICATION INSTRUCTIONS -   Does not apply Once     nystatin  1,000,000 Units Swish & Swallow 4x Daily     pantoprazole  40 mg Oral QAM AC     predniSONE  10 mg Oral Daily     predniSONE  5 mg Oral Daily     sodium chloride (PF)  3 mL Intracatheter During Hemodialysis (from stock)     sodium chloride (PF)  3 mL Intracatheter During Hemodialysis (from stock)     sodium chloride (PF)  3 mL Intracatheter Q8H     tacrolimus  2.5 mg Oral BID IS     triamcinolone   Topical BID     valGANciclovir  450 mg Oral Once per day on Tue Sat     vancomycin  125 mg Oral 4x Daily       IV fluid REPLACEMENT ONLY       Kristi Armas PA-C

## 2018-09-05 ENCOUNTER — APPOINTMENT (OUTPATIENT)
Dept: CARDIOLOGY | Facility: CLINIC | Age: 63
DRG: 329 | End: 2018-09-05
Attending: NURSE PRACTITIONER
Payer: MEDICARE

## 2018-09-05 ENCOUNTER — APPOINTMENT (OUTPATIENT)
Dept: ULTRASOUND IMAGING | Facility: CLINIC | Age: 63
DRG: 329 | End: 2018-09-05
Attending: NURSE PRACTITIONER
Payer: MEDICARE

## 2018-09-05 ENCOUNTER — TEAM CONFERENCE (OUTPATIENT)
Dept: TRANSPLANT | Facility: CLINIC | Age: 63
End: 2018-09-05

## 2018-09-05 ENCOUNTER — APPOINTMENT (OUTPATIENT)
Dept: OCCUPATIONAL THERAPY | Facility: CLINIC | Age: 63
DRG: 329 | End: 2018-09-05
Payer: MEDICARE

## 2018-09-05 DIAGNOSIS — Z94.1 HEART REPLACED BY TRANSPLANT (H): Primary | ICD-10-CM

## 2018-09-05 LAB
ANION GAP SERPL CALCULATED.3IONS-SCNC: 5 MMOL/L (ref 3–14)
BASOPHILS # BLD AUTO: 0 10E9/L (ref 0–0.2)
BASOPHILS NFR BLD AUTO: 0.6 %
BUN SERPL-MCNC: 16 MG/DL (ref 7–30)
CALCIUM SERPL-MCNC: 8.2 MG/DL (ref 8.5–10.1)
CHLORIDE SERPL-SCNC: 106 MMOL/L (ref 94–109)
CO2 SERPL-SCNC: 28 MMOL/L (ref 20–32)
CREAT SERPL-MCNC: 4.17 MG/DL (ref 0.66–1.25)
CRP SERPL-MCNC: 7.9 MG/L (ref 0–8)
DIFFERENTIAL METHOD BLD: ABNORMAL
EOSINOPHIL # BLD AUTO: 0.1 10E9/L (ref 0–0.7)
EOSINOPHIL NFR BLD AUTO: 3.1 %
ERYTHROCYTE [DISTWIDTH] IN BLOOD BY AUTOMATED COUNT: 21.9 % (ref 10–15)
GFR SERPL CREATININE-BSD FRML MDRD: 14 ML/MIN/1.7M2
GLUCOSE BLDC GLUCOMTR-MCNC: 102 MG/DL (ref 70–99)
GLUCOSE BLDC GLUCOMTR-MCNC: 219 MG/DL (ref 70–99)
GLUCOSE BLDC GLUCOMTR-MCNC: 229 MG/DL (ref 70–99)
GLUCOSE BLDC GLUCOMTR-MCNC: 242 MG/DL (ref 70–99)
GLUCOSE BLDC GLUCOMTR-MCNC: 97 MG/DL (ref 70–99)
GLUCOSE SERPL-MCNC: 114 MG/DL (ref 70–99)
HCT VFR BLD AUTO: 27.2 % (ref 40–53)
HGB BLD-MCNC: 8.5 G/DL (ref 13.3–17.7)
IMM GRANULOCYTES # BLD: 0 10E9/L (ref 0–0.4)
IMM GRANULOCYTES NFR BLD: 0 %
LACTATE BLD-SCNC: 1.1 MMOL/L (ref 0.7–2)
LYMPHOCYTES # BLD AUTO: 0.3 10E9/L (ref 0.8–5.3)
LYMPHOCYTES NFR BLD AUTO: 18.4 %
MAGNESIUM SERPL-MCNC: 1.7 MG/DL (ref 1.6–2.3)
MCH RBC QN AUTO: 31.8 PG (ref 26.5–33)
MCHC RBC AUTO-ENTMCNC: 31.3 G/DL (ref 31.5–36.5)
MCV RBC AUTO: 102 FL (ref 78–100)
MONOCYTES # BLD AUTO: 0.1 10E9/L (ref 0–1.3)
MONOCYTES NFR BLD AUTO: 5.5 %
NEUTROPHILS # BLD AUTO: 1.2 10E9/L (ref 1.6–8.3)
NEUTROPHILS NFR BLD AUTO: 72.4 %
NRBC # BLD AUTO: 0 10*3/UL
NRBC BLD AUTO-RTO: 0 /100
PHOSPHATE SERPL-MCNC: 2.2 MG/DL (ref 2.5–4.5)
PLATELET # BLD AUTO: 120 10E9/L (ref 150–450)
POTASSIUM SERPL-SCNC: 4.1 MMOL/L (ref 3.4–5.3)
RBC # BLD AUTO: 2.67 10E12/L (ref 4.4–5.9)
SODIUM SERPL-SCNC: 138 MMOL/L (ref 133–144)
TACROLIMUS BLD-MCNC: 9.4 UG/L (ref 5–15)
TME LAST DOSE: NORMAL H
WBC # BLD AUTO: 1.6 10E9/L (ref 4–11)

## 2018-09-05 PROCEDURE — A9270 NON-COVERED ITEM OR SERVICE: HCPCS | Mod: GY | Performed by: INTERNAL MEDICINE

## 2018-09-05 PROCEDURE — 27210982 ZZH KIT RT HC TOTES DISP CR7

## 2018-09-05 PROCEDURE — 27210788 ZZH MANIFOLD CR3

## 2018-09-05 PROCEDURE — 27211047 ZZH FORCEP BIOPSY CR10

## 2018-09-05 PROCEDURE — 25000132 ZZH RX MED GY IP 250 OP 250 PS 637: Mod: GY | Performed by: NURSE PRACTITIONER

## 2018-09-05 PROCEDURE — C1893 INTRO/SHEATH, FIXED,NON-PEEL: HCPCS

## 2018-09-05 PROCEDURE — A9270 NON-COVERED ITEM OR SERVICE: HCPCS | Mod: GY | Performed by: STUDENT IN AN ORGANIZED HEALTH CARE EDUCATION/TRAINING PROGRAM

## 2018-09-05 PROCEDURE — 36415 COLL VENOUS BLD VENIPUNCTURE: CPT | Performed by: COLON & RECTAL SURGERY

## 2018-09-05 PROCEDURE — 02BK3ZX EXCISION OF RIGHT VENTRICLE, PERCUTANEOUS APPROACH, DIAGNOSTIC: ICD-10-PCS | Performed by: INTERNAL MEDICINE

## 2018-09-05 PROCEDURE — 25000131 ZZH RX MED GY IP 250 OP 636 PS 637: Mod: GY | Performed by: INTERNAL MEDICINE

## 2018-09-05 PROCEDURE — 25000132 ZZH RX MED GY IP 250 OP 250 PS 637: Mod: GY | Performed by: INTERNAL MEDICINE

## 2018-09-05 PROCEDURE — 97110 THERAPEUTIC EXERCISES: CPT | Mod: GO

## 2018-09-05 PROCEDURE — 93971 EXTREMITY STUDY: CPT | Mod: RT

## 2018-09-05 PROCEDURE — 80197 ASSAY OF TACROLIMUS: CPT | Performed by: COLON & RECTAL SURGERY

## 2018-09-05 PROCEDURE — 93505 ENDOMYOCARDIAL BIOPSY: CPT | Mod: 26 | Performed by: INTERNAL MEDICINE

## 2018-09-05 PROCEDURE — 27210806 ZZH SHEATH CR5

## 2018-09-05 PROCEDURE — 40000133 ZZH STATISTIC OT WARD VISIT

## 2018-09-05 PROCEDURE — 25000125 ZZHC RX 250: Performed by: NURSE PRACTITIONER

## 2018-09-05 PROCEDURE — 99232 SBSQ HOSP IP/OBS MODERATE 35: CPT | Mod: 25 | Performed by: NURSE PRACTITIONER

## 2018-09-05 PROCEDURE — 88350 IMFLUOR EA ADDL 1ANTB STN PX: CPT | Performed by: INTERNAL MEDICINE

## 2018-09-05 PROCEDURE — 80048 BASIC METABOLIC PNL TOTAL CA: CPT | Performed by: COLON & RECTAL SURGERY

## 2018-09-05 PROCEDURE — 88307 TISSUE EXAM BY PATHOLOGIST: CPT | Performed by: INTERNAL MEDICINE

## 2018-09-05 PROCEDURE — 84100 ASSAY OF PHOSPHORUS: CPT | Performed by: COLON & RECTAL SURGERY

## 2018-09-05 PROCEDURE — A9270 NON-COVERED ITEM OR SERVICE: HCPCS | Mod: GY | Performed by: NURSE PRACTITIONER

## 2018-09-05 PROCEDURE — 25000132 ZZH RX MED GY IP 250 OP 250 PS 637: Mod: GY | Performed by: STUDENT IN AN ORGANIZED HEALTH CARE EDUCATION/TRAINING PROGRAM

## 2018-09-05 PROCEDURE — 88346 IMFLUOR 1ST 1ANTB STAIN PX: CPT | Performed by: INTERNAL MEDICINE

## 2018-09-05 PROCEDURE — 4A023N6 MEASUREMENT OF CARDIAC SAMPLING AND PRESSURE, RIGHT HEART, PERCUTANEOUS APPROACH: ICD-10-PCS | Performed by: INTERNAL MEDICINE

## 2018-09-05 PROCEDURE — 25000125 ZZHC RX 250: Performed by: ANESTHESIOLOGY

## 2018-09-05 PROCEDURE — 83605 ASSAY OF LACTIC ACID: CPT | Performed by: INTERNAL MEDICINE

## 2018-09-05 PROCEDURE — 86140 C-REACTIVE PROTEIN: CPT | Performed by: COLON & RECTAL SURGERY

## 2018-09-05 PROCEDURE — 85025 COMPLETE CBC W/AUTO DIFF WBC: CPT | Performed by: COLON & RECTAL SURGERY

## 2018-09-05 PROCEDURE — 21400006 ZZH R&B CCU INTERMEDIATE UMMC

## 2018-09-05 PROCEDURE — 00000146 ZZHCL STATISTIC GLUCOSE BY METER IP

## 2018-09-05 PROCEDURE — 83735 ASSAY OF MAGNESIUM: CPT | Performed by: COLON & RECTAL SURGERY

## 2018-09-05 PROCEDURE — 25000132 ZZH RX MED GY IP 250 OP 250 PS 637: Mod: GY | Performed by: COLON & RECTAL SURGERY

## 2018-09-05 PROCEDURE — 27211181 ZZH BALLOON TIP PRESSURE CR5

## 2018-09-05 PROCEDURE — 93505 ENDOMYOCARDIAL BIOPSY: CPT

## 2018-09-05 RX ADMIN — VANCOMYCIN HYDROCHLORIDE 125 MG: KIT at 16:05

## 2018-09-05 RX ADMIN — TACROLIMUS 2.5 MG: 1 CAPSULE ORAL at 08:50

## 2018-09-05 RX ADMIN — PREDNISONE 5 MG: 5 TABLET ORAL at 20:07

## 2018-09-05 RX ADMIN — TACROLIMUS 2.5 MG: 1 CAPSULE ORAL at 17:44

## 2018-09-05 RX ADMIN — ACETAMINOPHEN 1000 MG: 500 TABLET, FILM COATED ORAL at 12:02

## 2018-09-05 RX ADMIN — MYCOPHENOLATE MOFETIL 500 MG: 250 CAPSULE ORAL at 20:07

## 2018-09-05 RX ADMIN — AMLODIPINE BESYLATE 5 MG: 5 TABLET ORAL at 08:50

## 2018-09-05 RX ADMIN — HYDRALAZINE HYDROCHLORIDE 75 MG: 50 TABLET ORAL at 16:05

## 2018-09-05 RX ADMIN — VANCOMYCIN HYDROCHLORIDE 125 MG: KIT at 08:53

## 2018-09-05 RX ADMIN — Medication 50 MG: at 22:39

## 2018-09-05 RX ADMIN — NYSTATIN 1000000 UNITS: 100000 SUSPENSION ORAL at 20:07

## 2018-09-05 RX ADMIN — HYDRALAZINE HYDROCHLORIDE 75 MG: 50 TABLET ORAL at 20:07

## 2018-09-05 RX ADMIN — HYDRALAZINE HYDROCHLORIDE 75 MG: 50 TABLET ORAL at 08:50

## 2018-09-05 RX ADMIN — VANCOMYCIN HYDROCHLORIDE 125 MG: KIT at 12:51

## 2018-09-05 RX ADMIN — LIDOCAINE: 40 CREAM TOPICAL at 09:52

## 2018-09-05 RX ADMIN — PANTOPRAZOLE SODIUM 40 MG: 40 TABLET, DELAYED RELEASE ORAL at 11:27

## 2018-09-05 RX ADMIN — PREDNISONE 10 MG: 10 TABLET ORAL at 08:53

## 2018-09-05 RX ADMIN — NYSTATIN 1000000 UNITS: 100000 SUSPENSION ORAL at 11:27

## 2018-09-05 RX ADMIN — INSULIN ASPART 2 UNITS: 100 INJECTION, SOLUTION INTRAVENOUS; SUBCUTANEOUS at 18:20

## 2018-09-05 RX ADMIN — ATORVASTATIN CALCIUM 20 MG: 20 TABLET, FILM COATED ORAL at 20:08

## 2018-09-05 RX ADMIN — MYCOPHENOLATE MOFETIL 500 MG: 250 CAPSULE ORAL at 08:51

## 2018-09-05 RX ADMIN — VANCOMYCIN HYDROCHLORIDE 125 MG: KIT at 20:06

## 2018-09-05 RX ADMIN — NYSTATIN 1000000 UNITS: 100000 SUSPENSION ORAL at 16:08

## 2018-09-05 RX ADMIN — Medication 1 CAPSULE: at 11:26

## 2018-09-05 ASSESSMENT — ACTIVITIES OF DAILY LIVING (ADL)
ADLS_ACUITY_SCORE: 12

## 2018-09-05 ASSESSMENT — PAIN DESCRIPTION - DESCRIPTORS: DESCRIPTORS: DISCOMFORT

## 2018-09-05 NOTE — PLAN OF CARE
Problem: Patient Care Overview  Goal: Plan of Care/Patient Progress Review  Pt off unit at [facility] this AM, with OT in PM. Not available for PT

## 2018-09-05 NOTE — PROCEDURES
PRELIMINARY CARDIAC CATH REPORT:     PROCEDURES PERFORMED:   Right Heart Catheterization  Endomyocardial Biopsy    PHYSICIANS:  Attending Physician: Klever Ernst  Cardiology Fellow: Darvin Sharpe    INDICATION:  Murray Nicholson is a 63 year old male with a history of non-ischemic cardiomyopathy status post heart transplant on 6/14/2018, ESRD on hemodialysis and right internal jugular thrombosis, who presents to the cath lab for routine post-transplant surveillance right heart catheterization and endomyocardial biopsy.      DESCRIPTION:  1. Consent obtained with discussion of risks.  All questions were answered.  2. Sterile prep and procedure.  3. Location with Sheaths:   Lf IJ  7 Fr 10 cm [short]. After right heart catheterization the short sheath was exchanged over an 80cm J-tip wire for a long directional sheath for the biopsy.  4. Access: Local anesthetic with lidocaine.  A micropuncture 21 guage needle with ultrasound guidance was used to establish vascular access using a modified Seldinger technique.  5. Diagnostic Catheters:   7 Fr  [place]  6. Guiding Catheters:  None  6. Estimated blood loss: < 5 ml    MEDICATIONS:  Heart rate, BP, respiration, oxygen saturation and patient responses were monitored throughout the procedure with the assistance of the RN under my supervision.    Procedures:    HEMODYNAMICS:  BSA 2.0  1. HR 96 bpm  2. /81 mmHg  3. RA 5/8/4   4. RV 25/5  5. PA 30/15/20   6. PCW 10   7. PA sat 73.6%   8. PCW sat not obtained  9. Hgb 8.2 g/dL   10. Estimated Kassi CO 10.0   11. Estimated Kassi CI 5.06   12. TD CO 7.67   13. TD CI 3.88   14. PVR 1.0       ENDOMYOCARDIAL BIOPSY:  1. Successful collection of 4 right ventricular endomyocardial biopsy samples using the [place].      Sheath Removal:  The venous sheath was manually removed in the cardiac catheterization laboratory.    Contrast: Isovue, 5 ml     Fluoroscopy Time: 13.5 min    COMPLICATIONS:  1. None    SUMMARY:   >> Normal  right sided filling pressures.  >> Normal left sided filling pressures.  >> Normal PA pressures  >> Normal cardiac output.  >> Successful collection of 4 right ventricular biopsy specimens    PLAN:   >>. Return to the primary inpatient team for further evaluation and management.    The attending interventional cardiologist was present and supervised all critical aspects the procedure.    See CVIS report for final draft.    Darvin Sharpe   Cardiology Fellow    Klever Ernst   Cardiology Staff

## 2018-09-05 NOTE — PLAN OF CARE
Problem: Patient Care Overview  Goal: Plan of Care/Patient Progress Review  D:  Pt admitted 8/12 w/ hematochezia, 8/14 colostomy  I/A:  VSS on room air, SR/SR on tele.  Up SBA.  Follow up US done of RIJ DVT.  RHC/bx done this AM, IJsite WNL.    P:  Dialysis at 0730 tomorrow morning (9/6), scheduled to discharge to  TCU at 1400 tomorrow via [facility].  Continue with plan of care and notify team w/ changes/concerns.

## 2018-09-05 NOTE — PROGRESS NOTES
[facility]   Cardiology II Service / Advanced Heart Failure  Daily Progress Note  Date of Service: 9/5/2018      Patient: Murray Nicholson  MRN: [id number]  Admission Date: 8/12/2018  Hospital Day # 24    Assessment and Plan: Murray Nicholson is a 63 year old male with a PMH of NICM s/p OHT 6/14/18, ESRD on HD, RIJ thrombus, and DM Type II. He presented with hematochezia secondary to colonic abscess, rectal abscess, and colitis.      Pericolonic abscess, rectal abscess, and Colitis. S/p laparoscopic sigmoid colectomy with end colostomy 8/14 and drain to rectal abscess 8/12. Biopsy positive for VRE. Completed Cipro and Flagyl course 8/14-8/27. CT enterography 8/30 negative for abscess with improvement in dilated bowel.   - Appreciate CRS consult.   - Miralax daily prn.   - Appreciate Transplant ID consult, signed off.   - Repeat Blood cultures NTD.   - Low residue diet.      CDIF.   - Oral vanco through 9/6 to complete 10 day course.      Acute on Chronic Anemia. Likely acute exacerbation in setting of colitis. ASA and Heparin gtt held since 8/22. Received 1 unit PRBC 8/29. Lisinopril held in setting of anemia exacerbation. Per CRS, no scope due to risk of disruption anastomosis. Peripheral smear with no acute concerns 8/31.  - Hgb stable at 8.5 (8.1).   - Continue Epo per renal.      Leukopenia.  Peripheral smear with no acute concerns 8/31.  - WBC-1.9 with ANC 1.2.   - Blood cultures remain NTD.   - Discontinue Valcyte.   - Continue reduced dose of Cellcept 500 mg po BID, if biopsy negative and WBC continues to trend down decrease to 250 mg po BID.   - Histoplasmosis urine antigen pending.   - Immunknow in AM.     Right internal jugular thrombus. Last noted per US 8/12/18, nonocclusive.   - Eliquis remains on hold given acute bleed and low Hgb.   - Repeat US pending.      S/p OHT. 6/14/18 secondary to NICM. RHC 8/24/18 consistent with mRA-1, RV-24/1, mPA-17, mPAW-3, BECKA CO-7.5, and BECKA CI-3.8 with biopsy  OR, AMR1 with positive CD4 staining. Echo 7/20/18 with normal graft function.   Serostatus: HSV1-, HSV1+, CMV D+/R-, EBV D?/R+, VZV+  Immunosuppression: Cellcept 500 mg po BID, Prednisone 10 mg in AM and 5 mg at HS. Tac level pending. Goal-6-8.   Prophylaxis:Nystatin. Pentamidine last given 8/17 with repeat dose recommended 9/14. Valcyte discontinued, weekly CMV.  - Continue Lipitor.   - ASA remains on hold as above.   - Biopsy pending with RHC consistent with mRA-4, mPA-20, mPCW-10, BECKA CO-10, and BECKA CI-5.   - If biopsy negative decrease Prednisone per protocol and consider reduction in Cellcept if no improvement in Leukopenia.      HTN. Lisinopril held 8/6/18 due to concern for anemia exacerbation.   - Continue Hydralazine 75 mg po QID. Norvasc 5 mg po daily initiated 9/4/18.  - Hydralazine 10 mg IV prn SBP > 180 and BP > 110.      ESRD on HD. Remains inactive on renal transplant list.   - Appreciate Nephrology consult with HD every TTS. Note current volume status hypovolemic per RHC.     Non-Severe Protein Calorie Malnutrition. Albumin 1.9 9/3/18.  - Appreciate Nutrition consult.   - oral intake improving.      Chronic Medical Conditions:  Pseudomonas bacteremia secondary to necrotic oral ulcer.   Pulmonary Nodules. Stable RLL nodules measuring 5 mm and 3 mm per CT 8/12 and 8/29. Repeat Chest CT due 9/16.  DM Type II. Lantus and Medium sliding scale insulin.      FEN: Low fiber diet   PROPHY:  Held in setting of bleed. Protonix.   DISPO:  Ok for transfer to TCU when bed available.   CODE STATUS: Full Code   ================================================================    Interval History/ROS: He notes he is feeling well. He denies fever, chills, lightheadedness, dizziness, chest pain, palpitations, cough, nausea, vomiting, diarrhea, hematochezia, and hematemesis. He notes mild LE edema and UE edema. He is tolerating oral intake and therapy well at this time.     Last 24 hr care team notes reviewed.   ROS:   "4 point ROS including Respiratory, CV, GI and , other than that noted in the HPI, is negative.     Medications: Reviewed in EPIC.     Physical Exam:   /66  Pulse 106  Temp 98.7  F (37.1  C) (Oral)  Resp 16  Ht 1.753 m (5' 9\")  Wt 82.1 kg (180 lb 14.4 oz)  SpO2 100%  BMI 26.71 kg/m2  GENERAL: Appears alert and oriented times three.   HEENT: Eye symmetrical and free of discharge bilaterally. Mucous membranes moist and without lesions.  NECK: Supple and without lymphadenopathy. JVD below clavicular line upright.   CV: RRR, S1S2 present without murmur, rub, or gallop.   RESPIRATORY: Respirations regular, even, and unlabored. Lungs CTA throughout.   GI: Soft and non distended with normoactive bowel sounds present in all quadrants. No tenderness, rebound, guarding. No organomegaly.   EXTREMITIES: +1 bilateral LE peripheral edema. 2+ bilateral pedal pulses.   NEUROLOGIC: Alert and orientated x 3. CN II-XII grossly intact. No focal deficits.   MUSCULOSKELETAL: No joint swelling or tenderness.   SKIN: No jaundice. No rashes or lesions.     Data:  CMP  Recent Labs  Lab 09/05/18  0642 09/04/18  0712 09/03/18  0616 09/02/18  0634  08/31/18  0646 08/30/18  0724    136 136 137  < > 134 131*   POTASSIUM 4.1 4.3 4.0 3.8  < > 3.9 3.9   CHLORIDE 106 102 100 100  < > 98 97   CO2 28 26 28 28  < > 29 24   ANIONGAP 5 8 8 9  < > 7 10   * 150* 100* 112*  < > 121* 111*   BUN 16 29 22 17  < > 24 47*   CR 4.17* 5.95* 4.67* 3.59*  < > 3.81* 5.29*   GFRESTIMATED 14* 10* 13* 17*  < > 16* 11*   GFRESTBLACK 18* 12* 15* 21*  < > 19* 13*   TRINI 8.2* 8.0* 7.8* 8.0*  < > 7.4* 7.6*   MAG 1.7  --  1.8  --   --  1.6 1.8   PHOS 2.2*  --  2.2*  --   --  2.6  --    PROTTOTAL  --   --  5.2*  --   --  4.7*  --    ALBUMIN  --   --  1.9*  --   --  1.6*  --    BILITOTAL  --   --  0.5  --   --  0.5  --    ALKPHOS  --   --  138  --   --  133  --    AST  --   --  17  --   --  17  --    ALT  --   --  14  --   --  14  --    < > = values in " this interval not displayed.  CBC  Recent Labs  Lab 09/05/18  0642 09/04/18  0712 09/03/18  0616 09/02/18  0634   WBC 1.6* 2.0* 2.4* 3.2*   RBC 2.67* 2.59* 2.60* 2.57*   HGB 8.5* 8.1* 8.2* 8.0*   HCT 27.2* 26.2* 25.5* 25.1*   * 101* 98 98   MCH 31.8 31.3 31.5 31.1   MCHC 31.3* 30.9* 32.2 31.9   RDW 21.9* 21.3* 20.4* 20.2*   * 115* 96* 61*     INR  Recent Labs  Lab 09/03/18  0616   INR 1.05       Patient discussed with Dr. Austin.      Jennifer Dean [facility]  9/5/2018

## 2018-09-05 NOTE — PLAN OF CARE
Problem: Surgery Nonspecified (Adult)  Goal: Signs and Symptoms of Listed Potential Problems Will be Absent, Minimized or Managed (Surgery Nonspecified)  Signs and symptoms of listed potential problems will be absent, minimized or managed by discharge/transition of care (reference Surgery Nonspecified (Adult) CPG).   Outcome: Improving  D/I/A: Neuro status intact and unchanged. HR tachy, infrequent PAC's, BP stable - elevated in dialysis, hydralazine received. Lungs clear, on RA. Voiding scant amounts, on HD. Colostomy w/ stool output, changed by WOCN. Incisions c/d/i. Tolerating low fiber diet, awaiting dinner tray. Up in room w/ SBA.   P: NPO at [facility] for heart cath/bx tomorrow. Continue to monitor closely.

## 2018-09-05 NOTE — PROGRESS NOTES
Patient Name:  Murray Nicholson     Anticipated Discharge Date:  9/6/18    Discharge Disposition:   TCU:  [facility]    Transportation Needs: W/C ride on Thursday at 2pm.   Name of Transportation Company and Phone: [employer]     Patient/caregiver informed of referral to [facility] for Pre-Admission Screening for skilled nursing facility (SNF) placement and to expect a phone call post discharge from SNF.     Additional Services/Equipment Arranged:  Outpatient dialysis at [facility]     Persons notified of above discharge plan:  Dawn Gao 2, Charge RN    Patient / Family response to discharge plan:  Murray is agreeable. Discussed that he will need to set up his own transportation to and from dialysis. He reports that his friend and neighbor have been willing to help. He'll call them to discuss further.      Education provided by [facility] at discharge: role of transplant  in out patient setting and provided contact info for     Patient and family discharge goal: Murray is hopeful that he'll be able to do stairs and go home early next week.   Provided Education on discharge plan: YES  Patient agreeable to discharge plan:  YES  A list of Medicare Certified Facilities was provided to the patient and/or family to encourage patient choice. Patient's choices for facility are: [facility]- new heart transplant  Will NH provide Skilled rehabilitation or complex medical:  YES  General information regarding anticipated insurance coverage and possible out of pocket cost was discussed. Patient and patient's family are aware patient may incur the cost of transportation to the facility, pending insurance payment: YES  Barriers to discharge: none    CTS Handoff completed:  YES -verbal report given

## 2018-09-05 NOTE — OR NURSING
Pt arrived the the Cardiac Catheterization Lab for a right heart cath.   7 fr sheath removed from the LIJ . Manual pressure held until hemostasis has been obtained.  Soft, no hematoma.  No bleeding, oozing or discoloration seen.  Band Aid applied.  Pt educated to watch for any signs of bleeding, pain, or voice changes after discharge for the hospital.    Report called.

## 2018-09-05 NOTE — PLAN OF CARE
Problem: Patient Care Overview  Goal: Plan of Care/Patient Progress Review  OT/CR 6C  Discharge Planner OT   Patient plan for discharge: rehab  Current status: SBA supine to EOB. CGA sit<>stand x3 reps throughout session. Pt ambulated ~200ft x2 with no AE and SBA.   Barriers to return to prior living situation: fatigue, deconditioning, acute medical needs  Recommendations for discharge: TCU  Rationale for recommendations: Pt is below baseline and would benefit from continued skilled therapy to increase activity tolerance and independence with ADL completion.       Entered by: Annmarie Castro 09/05/2018 3:49 PM

## 2018-09-05 NOTE — PLAN OF CARE
"Problem: Patient Care Overview  Goal: Plan of Care/Patient Progress Review  Outcome: Improving  BP (!) 143/97 (BP Location: Left arm)  Pulse 106  Temp 98.8  F (37.1  C) (Oral)  Resp 16  Ht 1.753 m (5' 9\")  Wt 82.1 kg (180 lb 14.4 oz)  SpO2 100%  BMI 26.71 kg/m2    Alert and oriented x4. VSS on room air. Sinus tach 100s. Tramadol given at bedtime. On low fiber diet. NPO since midnight. Oliguric on dialysis. Pt did not void overnight. Colostomy in place with small amount of stool. Up independently. Pt slept between cares. Plan for right heart cath and biopsy today. Discharge pending bed availability at [facility]. Will continue to monitor and notify MDs accordingly.      "

## 2018-09-05 NOTE — PROGRESS NOTES
Care Coordinator - Discharge Planning    Admission Date/Time:  8/12/2018  Attending MD:  Arcelia Blum   Data   Chart reviewed, discussed with interdisciplinary team.   Patient was admitted for:   1. Heart transplant recipient (H)    2. Fever, unspecified fever cause    3. Chills    4. Hypoglycemia    5. Fever and chills    6. Type 2 diabetes mellitus with hypoglycemia without coma, unspecified long term insulin use status (H)    7. Blood in stool    8. Bacteremia due to Pseudomonas    9. Heart replaced by transplant (H)        Coordination of Care and Referrals: Provided patient/family with options for resumption of outpatient dialysis. Per SW, plan is for discharge to TCU tomorrow after dialysis. This writer assisted with updating pt's outpatient dialysis unit, [facility]. This writer called and spoke to Samanta at [facility] (Ph: [phone] Fax: [phone]) to update her on the discharge plan. Plan is for resumption of outpt dialysis on T-Th-Sat at 11 AM, to resume on Saturday 9/8/2018.       Plan  Anticipated Discharge Date: 9/6/2018  Anticipated Discharge Plan: Discharge to TCU  Kavitha Bates RN BSN  6C Unit Care Coordinator  Phone number: [phone]  Pager: [phone]

## 2018-09-05 NOTE — TELEPHONE ENCOUNTER
TEAM CONFERENCE:    ATTENDEES: Shawn Cowan Schenk, Coordinators, Social Work, Pharmacy, Finance, and Dietary    DISCUSSION and OUTCOME: Pt's status changed to inactive d/t being too sick.

## 2018-09-06 LAB
ANION GAP SERPL CALCULATED.3IONS-SCNC: 6 MMOL/L (ref 3–14)
BACTERIA SPEC CULT: NO GROWTH
BASOPHILS # BLD AUTO: 0 10E9/L (ref 0–0.2)
BASOPHILS NFR BLD AUTO: 0.8 %
BUN SERPL-MCNC: 24 MG/DL (ref 7–30)
CALCIUM SERPL-MCNC: 8 MG/DL (ref 8.5–10.1)
CHLORIDE SERPL-SCNC: 106 MMOL/L (ref 94–109)
CO2 SERPL-SCNC: 28 MMOL/L (ref 20–32)
COPATH REPORT: NORMAL
CREAT SERPL-MCNC: 5.48 MG/DL (ref 0.66–1.25)
CRP SERPL-MCNC: 7.6 MG/L (ref 0–8)
DIFFERENTIAL METHOD BLD: ABNORMAL
EOSINOPHIL # BLD AUTO: 0.1 10E9/L (ref 0–0.7)
EOSINOPHIL NFR BLD AUTO: 7.4 %
ERYTHROCYTE [DISTWIDTH] IN BLOOD BY AUTOMATED COUNT: 22 % (ref 10–15)
GFR SERPL CREATININE-BSD FRML MDRD: 11 ML/MIN/1.7M2
GLUCOSE BLDC GLUCOMTR-MCNC: 128 MG/DL (ref 70–99)
GLUCOSE BLDC GLUCOMTR-MCNC: 149 MG/DL (ref 70–99)
GLUCOSE BLDC GLUCOMTR-MCNC: 157 MG/DL (ref 70–99)
GLUCOSE SERPL-MCNC: 132 MG/DL (ref 70–99)
HCT VFR BLD AUTO: 26.3 % (ref 40–53)
HGB BLD-MCNC: 8.1 G/DL (ref 13.3–17.7)
HGB BLD-MCNC: 8.2 G/DL (ref 13.3–17.7)
IMM GRANULOCYTES # BLD: 0 10E9/L (ref 0–0.4)
IMM GRANULOCYTES NFR BLD: 0 %
LACTATE BLD-SCNC: 1.2 MMOL/L (ref 0.7–2)
LYMPHOCYTES # BLD AUTO: 0.3 10E9/L (ref 0.8–5.3)
LYMPHOCYTES NFR BLD AUTO: 21.3 %
Lab: NORMAL
MCH RBC QN AUTO: 31.8 PG (ref 26.5–33)
MCHC RBC AUTO-ENTMCNC: 31.2 G/DL (ref 31.5–36.5)
MCV RBC AUTO: 102 FL (ref 78–100)
MONOCYTES # BLD AUTO: 0.1 10E9/L (ref 0–1.3)
MONOCYTES NFR BLD AUTO: 5.7 %
NEUTROPHILS # BLD AUTO: 0.8 10E9/L (ref 1.6–8.3)
NEUTROPHILS NFR BLD AUTO: 64.8 %
NRBC # BLD AUTO: 0 10*3/UL
NRBC BLD AUTO-RTO: 0 /100
PLATELET # BLD AUTO: 131 10E9/L (ref 150–450)
POTASSIUM SERPL-SCNC: 4.5 MMOL/L (ref 3.4–5.3)
RBC # BLD AUTO: 2.58 10E12/L (ref 4.4–5.9)
SODIUM SERPL-SCNC: 140 MMOL/L (ref 133–144)
SPECIMEN SOURCE: NORMAL
WBC # BLD AUTO: 1.2 10E9/L (ref 4–11)

## 2018-09-06 PROCEDURE — 25000132 ZZH RX MED GY IP 250 OP 250 PS 637: Mod: GY | Performed by: NURSE PRACTITIONER

## 2018-09-06 PROCEDURE — A9270 NON-COVERED ITEM OR SERVICE: HCPCS | Mod: GY | Performed by: NURSE PRACTITIONER

## 2018-09-06 PROCEDURE — 25000128 H RX IP 250 OP 636: Performed by: INTERNAL MEDICINE

## 2018-09-06 PROCEDURE — A9270 NON-COVERED ITEM OR SERVICE: HCPCS | Mod: GY | Performed by: STUDENT IN AN ORGANIZED HEALTH CARE EDUCATION/TRAINING PROGRAM

## 2018-09-06 PROCEDURE — 85025 COMPLETE CBC W/AUTO DIFF WBC: CPT | Performed by: STUDENT IN AN ORGANIZED HEALTH CARE EDUCATION/TRAINING PROGRAM

## 2018-09-06 PROCEDURE — A9270 NON-COVERED ITEM OR SERVICE: HCPCS | Mod: GY | Performed by: INTERNAL MEDICINE

## 2018-09-06 PROCEDURE — 86352 CELL FUNCTION ASSAY W/STIM: CPT | Performed by: STUDENT IN AN ORGANIZED HEALTH CARE EDUCATION/TRAINING PROGRAM

## 2018-09-06 PROCEDURE — 36415 COLL VENOUS BLD VENIPUNCTURE: CPT | Performed by: NURSE PRACTITIONER

## 2018-09-06 PROCEDURE — 90937 HEMODIALYSIS REPEATED EVAL: CPT

## 2018-09-06 PROCEDURE — 85018 HEMOGLOBIN: CPT | Performed by: NURSE PRACTITIONER

## 2018-09-06 PROCEDURE — 80048 BASIC METABOLIC PNL TOTAL CA: CPT | Performed by: STUDENT IN AN ORGANIZED HEALTH CARE EDUCATION/TRAINING PROGRAM

## 2018-09-06 PROCEDURE — 25000125 ZZHC RX 250: Performed by: NURSE PRACTITIONER

## 2018-09-06 PROCEDURE — 25000131 ZZH RX MED GY IP 250 OP 636 PS 637: Mod: GY | Performed by: INTERNAL MEDICINE

## 2018-09-06 PROCEDURE — 25000132 ZZH RX MED GY IP 250 OP 250 PS 637: Mod: GY | Performed by: STUDENT IN AN ORGANIZED HEALTH CARE EDUCATION/TRAINING PROGRAM

## 2018-09-06 PROCEDURE — 36415 COLL VENOUS BLD VENIPUNCTURE: CPT | Performed by: STUDENT IN AN ORGANIZED HEALTH CARE EDUCATION/TRAINING PROGRAM

## 2018-09-06 PROCEDURE — 00000146 ZZHCL STATISTIC GLUCOSE BY METER IP

## 2018-09-06 PROCEDURE — 25000132 ZZH RX MED GY IP 250 OP 250 PS 637: Mod: GY | Performed by: INTERNAL MEDICINE

## 2018-09-06 PROCEDURE — 25000132 ZZH RX MED GY IP 250 OP 250 PS 637: Mod: GY | Performed by: COLON & RECTAL SURGERY

## 2018-09-06 PROCEDURE — 63400005 ZZH RX 634: Performed by: INTERNAL MEDICINE

## 2018-09-06 PROCEDURE — 21400006 ZZH R&B CCU INTERMEDIATE UMMC

## 2018-09-06 PROCEDURE — 25000131 ZZH RX MED GY IP 250 OP 636 PS 637: Mod: GY | Performed by: NURSE PRACTITIONER

## 2018-09-06 PROCEDURE — 40000902 ZZH STATISTIC WOC PT EDUCATION, 16-30 MIN

## 2018-09-06 PROCEDURE — 86140 C-REACTIVE PROTEIN: CPT | Performed by: STUDENT IN AN ORGANIZED HEALTH CARE EDUCATION/TRAINING PROGRAM

## 2018-09-06 PROCEDURE — 83605 ASSAY OF LACTIC ACID: CPT | Performed by: INTERNAL MEDICINE

## 2018-09-06 PROCEDURE — 99232 SBSQ HOSP IP/OBS MODERATE 35: CPT | Performed by: NURSE PRACTITIONER

## 2018-09-06 RX ORDER — MYCOPHENOLATE MOFETIL 250 MG/1
250 CAPSULE ORAL 2 TIMES DAILY
Status: DISCONTINUED | OUTPATIENT
Start: 2018-09-06 | End: 2018-09-08

## 2018-09-06 RX ADMIN — VANCOMYCIN HYDROCHLORIDE 125 MG: KIT at 17:17

## 2018-09-06 RX ADMIN — HYDRALAZINE HYDROCHLORIDE 75 MG: 50 TABLET ORAL at 12:07

## 2018-09-06 RX ADMIN — PREDNISONE 10 MG: 10 TABLET ORAL at 07:08

## 2018-09-06 RX ADMIN — VANCOMYCIN HYDROCHLORIDE 125 MG: KIT at 12:08

## 2018-09-06 RX ADMIN — SODIUM CHLORIDE 300 ML: 9 INJECTION, SOLUTION INTRAVENOUS at 07:32

## 2018-09-06 RX ADMIN — NYSTATIN 1000000 UNITS: 100000 SUSPENSION ORAL at 17:17

## 2018-09-06 RX ADMIN — TACROLIMUS 2.5 MG: 1 CAPSULE ORAL at 07:06

## 2018-09-06 RX ADMIN — INSULIN ASPART 1 UNITS: 100 INJECTION, SOLUTION INTRAVENOUS; SUBCUTANEOUS at 20:16

## 2018-09-06 RX ADMIN — Medication 50 MG: at 23:38

## 2018-09-06 RX ADMIN — NYSTATIN 1000000 UNITS: 100000 SUSPENSION ORAL at 20:11

## 2018-09-06 RX ADMIN — SODIUM CHLORIDE 250 ML: 9 INJECTION, SOLUTION INTRAVENOUS at 07:33

## 2018-09-06 RX ADMIN — TACROLIMUS 2.5 MG: 1 CAPSULE ORAL at 18:21

## 2018-09-06 RX ADMIN — Medication 1 CAPSULE: at 12:07

## 2018-09-06 RX ADMIN — VANCOMYCIN HYDROCHLORIDE 125 MG: KIT at 23:36

## 2018-09-06 RX ADMIN — PREDNISONE 5 MG: 5 TABLET ORAL at 20:11

## 2018-09-06 RX ADMIN — NYSTATIN 1000000 UNITS: 100000 SUSPENSION ORAL at 12:07

## 2018-09-06 RX ADMIN — ATORVASTATIN CALCIUM 20 MG: 20 TABLET, FILM COATED ORAL at 20:11

## 2018-09-06 RX ADMIN — NYSTATIN 1000000 UNITS: 100000 SUSPENSION ORAL at 07:07

## 2018-09-06 RX ADMIN — Medication: at 07:34

## 2018-09-06 RX ADMIN — VANCOMYCIN HYDROCHLORIDE 125 MG: KIT at 20:14

## 2018-09-06 RX ADMIN — AMLODIPINE BESYLATE 7.5 MG: 2.5 TABLET ORAL at 12:12

## 2018-09-06 RX ADMIN — MYCOPHENOLATE MOFETIL 250 MG: 250 CAPSULE ORAL at 20:11

## 2018-09-06 RX ADMIN — PANTOPRAZOLE SODIUM 40 MG: 40 TABLET, DELAYED RELEASE ORAL at 12:07

## 2018-09-06 RX ADMIN — MYCOPHENOLATE MOFETIL 500 MG: 250 CAPSULE ORAL at 07:06

## 2018-09-06 RX ADMIN — EPOETIN ALFA 10000 UNITS: 10000 SOLUTION INTRAVENOUS; SUBCUTANEOUS at 09:45

## 2018-09-06 RX ADMIN — HYDRALAZINE HYDROCHLORIDE 75 MG: 50 TABLET ORAL at 08:10

## 2018-09-06 RX ADMIN — HYDRALAZINE HYDROCHLORIDE 75 MG: 50 TABLET ORAL at 20:10

## 2018-09-06 ASSESSMENT — ACTIVITIES OF DAILY LIVING (ADL)
ADLS_ACUITY_SCORE: 12

## 2018-09-06 NOTE — PROGRESS NOTES
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 Right TDC, EDW increased to 79.5 kg   - Will continue TTS schedule   - No heparin   - list for kidney transplant with wait time dating from 3/24/16 - currently inactive as too ill to undergo kidney transplantation surgery, he will not get organ offers but he will continue to accrue wait time      Volume status: EDW 79.5 kg. Though he has edema, his RHC 9/5 with RA 4 and wedge 10    - Continue pre run weights, standing   - Strict I/O  -  EDW 79.5 kg based on RHC from 9/5 (discussed with cardiology, ideally RA of 7; will continue with current EDW for now)     Hypertension: 130-150's  Could consider ACEi though has side effect of anemia and not ideal in this patient with current epo resistance and ongoing requirement of PRBC.    - Per cards, on hydralazine 75 mg qid, amlodipine 5 mg qday         Transplant IS regimen:  Per cardiology: Cellcept 500 mg po BID, Prednisone 10 mg in AM and 5 mg at HS. Tac level pending. Goal-6-8. If 9/5 biopsy negative plan is to decrease Prednisone per protocol and consider reduction in Cellcept if no improvement in Leukopenia.       BMD: Ca 7.6, alb 1.8, phos 3.1    GIB: resolved  Anemia: hgb 8.2; last transfusion 8/29.    - continue epogen to 10,000 units      Pericolonic abscess, small perircal abscess: Underwent I/D anorectal abscess on 8/13 and  Sigmoidectomy/end colostomy on 8/14.      C  "diff: on PO vanc completed 9/6      Lung nodules - New Right lower lobe pulmonary nodules seen on CT this admission. ID recommending close f/u.  Has repeat CT 9/16.        Recommendations were communicated to primary team via progress note and in person    Kristi Armas PA-C  Division of Kidney Disease  Pager [phone]          Interval History :   Seen on dialysis, stable run with no UF based on RHC yesterday still showing low RA and wedge. Feeling better today, appetite ok and denies CP, SOB, n/v. Is doing a fair amount of rehab. May discharge to TCU today or tomorrow      Review of Systems:   4 point ROS negative other than as noted above.    Physical Exam:   I/O last 3 completed shifts:  In: 840 [P.O.:840]  Out: 150 [Urine:150]   BP (!) 152/94  Pulse 106  Temp 98  F (36.7  C) (Oral)  Resp 18  Ht 1.753 m (5' 9\")  Wt 79.2 kg (174 lb 9.7 oz)  SpO2 100%  BMI 25.78 kg/m2     GENERAL APPEARANCE: alert, NAD  EYES: no scleral icterus, pupils equal  Pulmonary: normal work of breathing  CV: WWP   - Edema 1-2+ upper and lower extremity edema  NEURO: alert/oriented, face symmetric and speech is fluent  ACCESS: Right TDC    Labs:   All labs reviewed by me  Electrolytes/Renal -   Recent Labs   Lab Test  09/06/18   0705  09/05/18   0642  09/04/18   0712  09/03/18   0616   08/31/18   0646   NA  140  138  136  136   < >  134   POTASSIUM  4.5  4.1  4.3  4.0   < >  3.9   CHLORIDE  106  106  102  100   < >  98   CO2  28  28  26  28   < >  29   BUN  24  16  29  22   < >  24   CR  5.48*  4.17*  5.95*  4.67*   < >  3.81*   GLC  132*  114*  150*  100*   < >  121*   TRINI  8.0*  8.2*  8.0*  7.8*   < >  7.4*   MAG   --   1.7   --   1.8   --   1.6   PHOS   --   2.2*   --   2.2*   --   2.6    < > = values in this interval not displayed.       CBC -   Recent Labs   Lab Test  09/06/18   0705  09/05/18   0642  09/04/18   0712   WBC  1.2*  1.6*  2.0*   HGB  8.2*  8.5*  8.1*   PLT  131*  120*  115*       LFTs -   Recent Labs   Lab Test  " 09/03/18   0616  08/31/18   0646  08/27/18   0644   ALKPHOS  138  133  133   BILITOTAL  0.5  0.5  0.5   ALT  14  14  13   AST  17  17  16   PROTTOTAL  5.2*  4.7*  5.2*   ALBUMIN  1.9*  1.6*  1.8*       Iron Panel -   Recent Labs   Lab Test  07/10/18   0711  05/08/18   0954  07/19/17   1306   IRON  66  58  46   IRONSAT  28  27  18   ALBERTINA  771*  621*  369       Current Medications:    amLODIPine  7.5 mg Oral Daily     atorvastatin  20 mg Oral QPM     fluticasone  1-2 spray Both Nostrils Daily     hydrALAZINE  75 mg Oral 4x Daily     insulin aspart  1-6 Units Subcutaneous TID w/meals     insulin aspart  1-5 Units Subcutaneous At Bedtime     insulin glargine  20 Units Subcutaneous QAM AC     mycophenolate  250 mg Oral BID     NEPHROCAPS  1 capsule Oral Daily     nystatin  1,000,000 Units Swish & Swallow 4x Daily     pantoprazole  40 mg Oral QAM AC     predniSONE  10 mg Oral Daily     predniSONE  5 mg Oral Daily     sodium chloride (PF)  3 mL Intracatheter Q8H     tacrolimus  2.5 mg Oral BID IS     triamcinolone   Topical BID     vancomycin  125 mg Oral 4x Daily       IV fluid REPLACEMENT ONLY       Kristi Armas PA-C

## 2018-09-06 NOTE — PROGRESS NOTES
HEMODIALYSIS TREATMENT NOTE    Date: 9/6/2018  Time: 9:25 AM    Data:  Pre Wt: 79.2 kg (174 lb 9.7 oz)   Desired Wt: 79.2 kg   Post Wt: 79.2 kg (175 lb 4.3 oz)  Weight gain: -0 kg   Weight change: 0 kg  Ultrafiltration - Post Run Net Total Removed (mL): 0 mL  Ultrafiltration - Post Run Net Total Gain (mL): 0 mL  Vascular Access Status: Yes, secured and visible  Dialyzer Rinse: Light  Total Blood Volume Processed: 69.5  Total Dialysis (Treatment) Time:3.5hrs      Lab:   none    Interventions:Assessments  Pt dialyzed today for 3.5hrs on a K-3 Ca-3 bath via HDCVC. No UF ordered today. VSS throughout. Ate breakfast during run. Tx. Uneventful. See MAR for meds given. See Epic for further details. Report to primary RN     Plan:    Per renal

## 2018-09-06 NOTE — PROGRESS NOTES
[facility]   Cardiology II Service / Advanced Heart Failure  Daily Progress Note  Date of Service: 9/6/2018      Patient: Murray Nicholson  MRN: [id number]  Admission Date: 8/12/2018  Hospital Day # 25    Assessment and Plan: Murray Nicholson is a 63 year old male with a PMH of NICM s/p OHT 6/14/18, ESRD on HD, RIJ thrombus, and DM Type II. He presented with hematochezia secondary to colonic abscess, rectal abscess, and colitis.       Pericolonic abscess, rectal abscess, and Colitis. S/p laparoscopic sigmoid colectomy with end colostomy 8/14 and drain to rectal abscess 8/12. Biopsy positive for VRE. Completed Cipro and Flagyl course 8/14-8/27. CT enterography 8/30 negative for abscess with improvement in dilated bowel. ID and CRS signed off.   - Miralax daily prn.   - Repeat Blood cultures NTD.   - Low residue diet.   - Follow up with CRS outpatient.       CDIF.   - Oral vanco through today to complete 10 day course.       Acute on Chronic Anemia. Likely acute exacerbation in setting of colitis. ASA and Heparin gtt held since 8/22. Received 1 unit PRBC 8/29. Lisinopril held in setting of anemia exacerbation. Per CRS, no scope due to risk of disruption anastomosis. Peripheral smear with no acute concerns 8/31.  - Hgb stable at 8.2, recheck at 1800 for change in stool.   - Continue Epo per renal.       Leukopenia.  Peripheral smear with no acute concerns 8/31. Valcyte discontinued 9/5/18.   - WBC-1.2 with ANC 0.8. Neutropenic precautions. Defer GCFS per Dr. Ernst at this time.   - Blood cultures remain NTD.   - Decrease Cellcept to 250 mg po BID.   - Histoplasmosis urine antigen pending.   - Immunknow pending.      Right internal jugular thrombus. Last noted per US 8/12/18, nonocclusive.   - Eliquis remains on hold given acute bleed and low Hgb. Change in stools this afternoon, defer resumption and continue to monitor Hgb.   - Repeat US notes nonocclusive thrombus to the lower right internal jugular.        S/p OHT. 6/14/18 secondary to NICM. Echo 7/20/18 with normal graft function.   Serostatus: HSV1-, HSV1+, CMV D+/R-, EBV D?/R+, VZV+.  Immunosuppression: Cellcept 250 mg po BID, Prednisone 10 mg in AM and 5 mg at HS pending biopsy. Tac level 9.5 with repeat . Goal-6-8. Repeat level in AM.   Prophylaxis:Nystatin. Pentamidine last given 8/17 with repeat dose recommended 9/14. Valcyte discontinued, weekly CMV.  - Continue Lipitor.   - ASA remains on hold as above.   - Biopsy pending from 9/5/18 with RHC consistent with mRA-4, mPA-20, mPCW-10, BECKA CO-10, and BECKA CI-5.       HTN. Lisinopril held 8/6/18 due to concern for anemia exacerbation.   - Continue Hydralazine 75 mg po QID. Norvasc 7.5 mg po daily.       ESRD on HD. Remains inactive on renal transplant list.   - Appreciate Nephrology consult with HD every TTS. Note no fluid removal today.       Non-Severe Protein Calorie Malnutrition. Albumin 1.9 9/3/18.  - Appreciate Nutrition consult.   - oral intake improving.       Chronic Medical Conditions:  Pseudomonas bacteremia secondary to necrotic oral ulcer.   Pulmonary Nodules. Stable RLL nodules measuring 5 mm and 3 mm per CT 8/12 and 8/29. Repeat Chest CT due 9/16.  DM Type II. Lantus and Medium sliding scale insulin.       FEN: Low fiber diet   PROPHY:  Held in setting of bleed. Protonix.   DISPO:  Ok for transfer to TCU. TCU deferred discharge secondary to Leukopenia and HTN. Will continue aggressive therapy inpatient.   CODE STATUS: Full Code   ================================================================  Interval History/ROS: He notes change in light brown stool to dark green, but no evidence of melanotic stools. He denies fever, chills, chest pain, palpitations, MEADE, cough, nausea, vomiting, hematochezia, and hematemesis. He complains of UE and LE edema. He is tolerating oral intake and continues to progress with ambulation.     Last 24 hr care team notes reviewed.   ROS:  4 point ROS including  "Respiratory, CV, GI and , other than that noted in the HPI, is negative.     Medications: Reviewed in EPIC.     Physical Exam:   /90  Pulse 106  Temp 98.2  F (36.8  C) (Oral)  Resp 18  Ht 1.753 m (5' 9\")  Wt 79.2 kg (174 lb 9.7 oz)  SpO2 100%  BMI 25.78 kg/m2  GENERAL: Appears alert and oriented times three.   HEENT: Eye symmetrical and free of discharge bilaterally. Mucous membranes moist and without lesions.  NECK: Supple and without lymphadenopathy. JVD below clavicular line upright.   CV: Regular, tachycardic. S1S2 present without murmur, rub, or gallop.   RESPIRATORY: Respirations regular, even, and unlabored. Lungs CTA throughout.   GI: Soft and non distended with normoactive bowel sounds present in all quadrants. No tenderness, rebound, guarding. No organomegaly. Colostomy with dark green stool.   EXTREMITIES: +1 bilateral UE and LE peripheral edema. 2+ bilateral pedal pulses.   NEUROLOGIC: Alert and orientated x 3. CN II-XII grossly intact. No focal deficits.   MUSCULOSKELETAL: No joint swelling or tenderness.   SKIN: No jaundice. No rashes or lesions.     Data:  CMP  Recent Labs  Lab 09/06/18  0705 09/05/18  0642 09/04/18  0712 09/03/18  0616  08/31/18  0646    138 136 136  < > 134   POTASSIUM 4.5 4.1 4.3 4.0  < > 3.9   CHLORIDE 106 106 102 100  < > 98   CO2 28 28 26 28  < > 29   ANIONGAP 6 5 8 8  < > 7   * 114* 150* 100*  < > 121*   BUN 24 16 29 22  < > 24   CR 5.48* 4.17* 5.95* 4.67*  < > 3.81*   GFRESTIMATED 11* 14* 10* 13*  < > 16*   GFRESTBLACK 13* 18* 12* 15*  < > 19*   TRINI 8.0* 8.2* 8.0* 7.8*  < > 7.4*   MAG  --  1.7  --  1.8  --  1.6   PHOS  --  2.2*  --  2.2*  --  2.6   PROTTOTAL  --   --   --  5.2*  --  4.7*   ALBUMIN  --   --   --  1.9*  --  1.6*   BILITOTAL  --   --   --  0.5  --  0.5   ALKPHOS  --   --   --  138  --  133   AST  --   --   --  17  --  17   ALT  --   --   --  14  --  14   < > = values in this interval not displayed.  CBC  Recent Labs  Lab 09/06/18  0705 " 09/05/18  0642 09/04/18  0712 09/03/18  0616   WBC 1.2* 1.6* 2.0* 2.4*   RBC 2.58* 2.67* 2.59* 2.60*   HGB 8.2* 8.5* 8.1* 8.2*   HCT 26.3* 27.2* 26.2* 25.5*   * 102* 101* 98   MCH 31.8 31.8 31.3 31.5   MCHC 31.2* 31.3* 30.9* 32.2   RDW 22.0* 21.9* 21.3* 20.4*   * 120* 115* 96*     INR  Recent Labs  Lab 09/03/18  0616   INR 1.05       Patient discussed with Dr. Austin.     Jennifer Dean FN  9/6/2018

## 2018-09-06 NOTE — PLAN OF CARE
Problem: Patient Care Overview  Goal: Plan of Care/Patient Progress Review  PT 6C: CANCEL- Pt at dialysis, planning for discharge after dialysis in PM.

## 2018-09-06 NOTE — PROGRESS NOTES
[facility], [place]    Sepsis Evaluation Progress Note    Date of Service: 09/06/2018    Physical Exam    Vital Signs:  Temp: 98.2  F (36.8  C) Temp src: Oral BP: 142/90   Heart Rate: 99 Resp: 18 SpO2: 100 % O2 Device: None (Room air)      Lab:  Lactic Acid   Date Value Ref Range Status   09/02/2018 1.3 0.7 - 2.0 mmol/L Final     Lactate for Sepsis Protocol   Date Value Ref Range Status   09/06/2018 1.2 0.7 - 2.0 mmol/L Final       The patient is at baseline mental status.    Refer to progress note for exam findings.     Assessment and Plan    The SIRS and exam findings are likely due to tachycardia s/p OHT and leukopenia secondary to immunosuppression medications.     Disposition: The patient will remain on the current unit. We will continue to monitor this patient closely. No further sepsis labs indicated.     VERONICA Oseguera CNP

## 2018-09-06 NOTE — PROGRESS NOTES
Transplant Social Work Services Progress Note      Date of Initial Social Work Evaluation: 9/6/18  Collaborated with: Dawn Gao 2, bedside RN    Data: SW had Murray set up for dc today to [facility]. Their medical team didn't feel he was medically appropriate for admission to TCU today d/t blood pressure.   Intervention: DINH updated Murray & bedside RN. Bumped [employer] Ride to 2pm on Friday.   Assessment: Murray understood that this was a possibility. Agreeable to dc tomorrow if appropriate.   Education provided by SW: dc plan  Plan:    Discharge Plans in Progress: Tentatively set up to dc to [facility]CU on Friday at 2pm with [employer] transport.      Barriers to d/c plan: medical stability    Follow up Plan: DINH will continue to follow as needed.    Pager [phone]

## 2018-09-06 NOTE — PROVIDER NOTIFICATION
Cards cross cover notified that patient triggered sepsis protocol d/t WBC 1.6 and . Pt stated he is going to refuse frequent vital signs check and lactic acid draw.

## 2018-09-06 NOTE — PROGRESS NOTES
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     Physical Exam:  Current pouching system:[place] 1 piece flat   Reason for pouch change today:  Not changed today  Stoma appearance: red, oval and moist  Stoma size; 1 5/8\" with good protrusion ,    Peristomal skin: intact  Stoma output :brown and pasty   Abdominal  Assessment  firm , NG still in place? No  Surgical Site: open to air  Pain: Dull ache  Is patient still on a PCA No    Interventions:  Patient's chart evaluated.  Focus of today's visit:  Answer patient questions about pouching options, went over supplies in sample kit.  Reviewed how to order supplies   Participant of teaching session today patient   Orders: reviewed  Change made with ostomy management today: no    Supplies: at bedside     Plan:  Learning needs: pouch change practice     Will be discharging to TCU     Discussed plan of care with Patient and Nurse  Nursing to notify the Provider(s) and re-consult the WOC Nurse if new ostomy concerns or discharge planned before next planned WOC visit.    WOC Nurse will return: Friday   Face to face time:  20 minutes                        "

## 2018-09-06 NOTE — PLAN OF CARE
Problem: Patient Care Overview  Goal: Plan of Care/Patient Progress Review  OT/6C: Cancel, pt at dialysis this AM, will check back as able this PM pending discharge.

## 2018-09-06 NOTE — PLAN OF CARE
"Problem: Patient Care Overview  Goal: Plan of Care/Patient Progress Review  Outcome: Improving  BP (!) 158/104 (BP Location: Left arm)  Pulse 106  Temp 98.2  F (36.8  C) (Oral)  Resp 16  Ht 1.753 m (5' 9\")  Wt 82.1 kg (180 lb 14.4 oz)  SpO2 100%  BMI 26.71 kg/m2    Alert and oriented x4. VSS on room air. Sinus rhythm/sinus tach 90s-110. Pt c/o abdominal discomfort. Tramadol given at bedtime. On low fiber diet. Good appetite. Colostomy with large stool output. Pt assisted with emptying colostomy bag. Oliguric on dialysis. Did not void overnight. PIV and subclavian dialysis cath saline locked. Up independently in the room. Sepsis protocol triggered. Pt refused vitals and lactic acid. MDs aware. Plan for dialysis at 0730 and discharge to ARU today. [facility] ride set up for 1400. Will continue to monitor and notify MDs accordingly.      "

## 2018-09-07 ENCOUNTER — APPOINTMENT (OUTPATIENT)
Dept: OCCUPATIONAL THERAPY | Facility: CLINIC | Age: 63
DRG: 329 | End: 2018-09-07
Payer: MEDICARE

## 2018-09-07 ENCOUNTER — APPOINTMENT (OUTPATIENT)
Dept: PHYSICAL THERAPY | Facility: CLINIC | Age: 63
DRG: 329 | End: 2018-09-07
Payer: MEDICARE

## 2018-09-07 LAB
ANION GAP SERPL CALCULATED.3IONS-SCNC: 6 MMOL/L (ref 3–14)
BASOPHILS # BLD AUTO: 0 10E9/L (ref 0–0.2)
BASOPHILS NFR BLD AUTO: 0.9 %
BUN SERPL-MCNC: 12 MG/DL (ref 7–30)
CALCIUM SERPL-MCNC: 8 MG/DL (ref 8.5–10.1)
CHLORIDE SERPL-SCNC: 99 MMOL/L (ref 94–109)
CMV DNA SPEC NAA+PROBE-ACNC: NORMAL [IU]/ML
CMV DNA SPEC NAA+PROBE-LOG#: NORMAL {LOG_IU}/ML
CO2 SERPL-SCNC: 31 MMOL/L (ref 20–32)
CREAT SERPL-MCNC: 3.49 MG/DL (ref 0.66–1.25)
CRP SERPL-MCNC: 5 MG/L (ref 0–8)
DIFFERENTIAL METHOD BLD: ABNORMAL
EOSINOPHIL # BLD AUTO: 0.1 10E9/L (ref 0–0.7)
EOSINOPHIL NFR BLD AUTO: 7.8 %
ERYTHROCYTE [DISTWIDTH] IN BLOOD BY AUTOMATED COUNT: 21.8 % (ref 10–15)
GFR SERPL CREATININE-BSD FRML MDRD: 18 ML/MIN/1.7M2
GLUCOSE BLDC GLUCOMTR-MCNC: 101 MG/DL (ref 70–99)
GLUCOSE BLDC GLUCOMTR-MCNC: 163 MG/DL (ref 70–99)
GLUCOSE BLDC GLUCOMTR-MCNC: 211 MG/DL (ref 70–99)
GLUCOSE BLDC GLUCOMTR-MCNC: 69 MG/DL (ref 70–99)
GLUCOSE BLDC GLUCOMTR-MCNC: 99 MG/DL (ref 70–99)
GLUCOSE SERPL-MCNC: 78 MG/DL (ref 70–99)
HCT VFR BLD AUTO: 25.7 % (ref 40–53)
HGB BLD-MCNC: 8.1 G/DL (ref 13.3–17.7)
IMM GRANULOCYTES # BLD: 0 10E9/L (ref 0–0.4)
IMM GRANULOCYTES NFR BLD: 0 %
LAB SCANNED RESULT: NORMAL
LYMPHOCYTES # BLD AUTO: 0.3 10E9/L (ref 0.8–5.3)
LYMPHOCYTES NFR BLD AUTO: 28.7 %
MAGNESIUM SERPL-MCNC: 1.5 MG/DL (ref 1.6–2.3)
MAGNESIUM SERPL-MCNC: 2.6 MG/DL (ref 1.6–2.3)
MCH RBC QN AUTO: 31.6 PG (ref 26.5–33)
MCHC RBC AUTO-ENTMCNC: 31.5 G/DL (ref 31.5–36.5)
MCV RBC AUTO: 100 FL (ref 78–100)
MONOCYTES # BLD AUTO: 0.1 10E9/L (ref 0–1.3)
MONOCYTES NFR BLD AUTO: 6.1 %
NEUTROPHILS # BLD AUTO: 0.7 10E9/L (ref 1.6–8.3)
NEUTROPHILS NFR BLD AUTO: 56.5 %
NRBC # BLD AUTO: 0 10*3/UL
NRBC BLD AUTO-RTO: 0 /100
PHOSPHATE SERPL-MCNC: 1.7 MG/DL (ref 2.5–4.5)
PLATELET # BLD AUTO: 126 10E9/L (ref 150–450)
POTASSIUM SERPL-SCNC: 3.9 MMOL/L (ref 3.4–5.3)
RBC # BLD AUTO: 2.56 10E12/L (ref 4.4–5.9)
SODIUM SERPL-SCNC: 136 MMOL/L (ref 133–144)
SPECIMEN SOURCE: NORMAL
TACROLIMUS BLD-MCNC: 9.5 UG/L (ref 5–15)
TME LAST DOSE: NORMAL H
WBC # BLD AUTO: 1.2 10E9/L (ref 4–11)

## 2018-09-07 PROCEDURE — 85025 COMPLETE CBC W/AUTO DIFF WBC: CPT | Performed by: COLON & RECTAL SURGERY

## 2018-09-07 PROCEDURE — 83735 ASSAY OF MAGNESIUM: CPT | Performed by: NURSE PRACTITIONER

## 2018-09-07 PROCEDURE — 25000132 ZZH RX MED GY IP 250 OP 250 PS 637: Mod: GY | Performed by: NURSE PRACTITIONER

## 2018-09-07 PROCEDURE — 25000125 ZZHC RX 250: Performed by: NURSE PRACTITIONER

## 2018-09-07 PROCEDURE — 97530 THERAPEUTIC ACTIVITIES: CPT | Mod: GP

## 2018-09-07 PROCEDURE — 84100 ASSAY OF PHOSPHORUS: CPT | Performed by: COLON & RECTAL SURGERY

## 2018-09-07 PROCEDURE — 25000128 H RX IP 250 OP 636: Performed by: NURSE PRACTITIONER

## 2018-09-07 PROCEDURE — 80048 BASIC METABOLIC PNL TOTAL CA: CPT | Performed by: COLON & RECTAL SURGERY

## 2018-09-07 PROCEDURE — A9270 NON-COVERED ITEM OR SERVICE: HCPCS | Mod: GY | Performed by: NURSE PRACTITIONER

## 2018-09-07 PROCEDURE — 36415 COLL VENOUS BLD VENIPUNCTURE: CPT | Performed by: COLON & RECTAL SURGERY

## 2018-09-07 PROCEDURE — A9270 NON-COVERED ITEM OR SERVICE: HCPCS | Mod: GY | Performed by: STUDENT IN AN ORGANIZED HEALTH CARE EDUCATION/TRAINING PROGRAM

## 2018-09-07 PROCEDURE — 80197 ASSAY OF TACROLIMUS: CPT | Performed by: NURSE PRACTITIONER

## 2018-09-07 PROCEDURE — 25000132 ZZH RX MED GY IP 250 OP 250 PS 637: Mod: GY | Performed by: STUDENT IN AN ORGANIZED HEALTH CARE EDUCATION/TRAINING PROGRAM

## 2018-09-07 PROCEDURE — 25000132 ZZH RX MED GY IP 250 OP 250 PS 637: Mod: GY | Performed by: INTERNAL MEDICINE

## 2018-09-07 PROCEDURE — 99232 SBSQ HOSP IP/OBS MODERATE 35: CPT | Performed by: NURSE PRACTITIONER

## 2018-09-07 PROCEDURE — 00000146 ZZHCL STATISTIC GLUCOSE BY METER IP

## 2018-09-07 PROCEDURE — 97530 THERAPEUTIC ACTIVITIES: CPT | Mod: GO

## 2018-09-07 PROCEDURE — 21400006 ZZH R&B CCU INTERMEDIATE UMMC

## 2018-09-07 PROCEDURE — 36415 COLL VENOUS BLD VENIPUNCTURE: CPT | Performed by: NURSE PRACTITIONER

## 2018-09-07 PROCEDURE — 25000131 ZZH RX MED GY IP 250 OP 636 PS 637: Mod: GY | Performed by: STUDENT IN AN ORGANIZED HEALTH CARE EDUCATION/TRAINING PROGRAM

## 2018-09-07 PROCEDURE — 97110 THERAPEUTIC EXERCISES: CPT | Mod: GO

## 2018-09-07 PROCEDURE — 25000131 ZZH RX MED GY IP 250 OP 636 PS 637: Mod: GY | Performed by: INTERNAL MEDICINE

## 2018-09-07 PROCEDURE — 86140 C-REACTIVE PROTEIN: CPT | Performed by: COLON & RECTAL SURGERY

## 2018-09-07 PROCEDURE — 40000903 ZZH STATISTIC WOC PT EDUCATION, 31-45 MIN

## 2018-09-07 PROCEDURE — 25000132 ZZH RX MED GY IP 250 OP 250 PS 637: Mod: GY | Performed by: COLON & RECTAL SURGERY

## 2018-09-07 PROCEDURE — 83735 ASSAY OF MAGNESIUM: CPT | Performed by: COLON & RECTAL SURGERY

## 2018-09-07 PROCEDURE — 97116 GAIT TRAINING THERAPY: CPT | Mod: GP

## 2018-09-07 PROCEDURE — 97110 THERAPEUTIC EXERCISES: CPT | Mod: GP

## 2018-09-07 PROCEDURE — 40000193 ZZH STATISTIC PT WARD VISIT

## 2018-09-07 PROCEDURE — 40000133 ZZH STATISTIC OT WARD VISIT

## 2018-09-07 PROCEDURE — 25000131 ZZH RX MED GY IP 250 OP 636 PS 637: Mod: GY | Performed by: NURSE PRACTITIONER

## 2018-09-07 PROCEDURE — A9270 NON-COVERED ITEM OR SERVICE: HCPCS | Mod: GY | Performed by: INTERNAL MEDICINE

## 2018-09-07 RX ORDER — PREDNISONE 5 MG/1
5 TABLET ORAL 2 TIMES DAILY WITH MEALS
Status: DISCONTINUED | OUTPATIENT
Start: 2018-09-07 | End: 2018-09-11 | Stop reason: HOSPADM

## 2018-09-07 RX ORDER — MAGNESIUM SULFATE HEPTAHYDRATE 40 MG/ML
4 INJECTION, SOLUTION INTRAVENOUS EVERY 4 HOURS PRN
Status: DISCONTINUED | OUTPATIENT
Start: 2018-09-07 | End: 2018-09-11 | Stop reason: HOSPADM

## 2018-09-07 RX ORDER — TACROLIMUS 1 MG/1
2 CAPSULE ORAL AT BEDTIME
Status: DISCONTINUED | OUTPATIENT
Start: 2018-09-07 | End: 2018-09-07

## 2018-09-07 RX ORDER — TACROLIMUS 1 MG/1
2 CAPSULE ORAL
Status: DISCONTINUED | OUTPATIENT
Start: 2018-09-07 | End: 2018-09-07

## 2018-09-07 RX ORDER — TACROLIMUS 1 MG/1
2 CAPSULE ORAL
Status: DISCONTINUED | OUTPATIENT
Start: 2018-09-07 | End: 2018-09-11 | Stop reason: HOSPADM

## 2018-09-07 RX ADMIN — PREDNISONE 5 MG: 5 TABLET ORAL at 18:26

## 2018-09-07 RX ADMIN — NYSTATIN 1000000 UNITS: 100000 SUSPENSION ORAL at 08:01

## 2018-09-07 RX ADMIN — PREDNISONE 10 MG: 10 TABLET ORAL at 08:00

## 2018-09-07 RX ADMIN — AMLODIPINE BESYLATE 7.5 MG: 2.5 TABLET ORAL at 08:00

## 2018-09-07 RX ADMIN — MAGNESIUM SULFATE HEPTAHYDRATE 4 G: 40 INJECTION, SOLUTION INTRAVENOUS at 10:17

## 2018-09-07 RX ADMIN — TACROLIMUS 2 MG: 1 CAPSULE ORAL at 18:26

## 2018-09-07 RX ADMIN — NYSTATIN 1000000 UNITS: 100000 SUSPENSION ORAL at 15:51

## 2018-09-07 RX ADMIN — HYDRALAZINE HYDROCHLORIDE 75 MG: 50 TABLET ORAL at 08:00

## 2018-09-07 RX ADMIN — INSULIN ASPART 1 UNITS: 100 INJECTION, SOLUTION INTRAVENOUS; SUBCUTANEOUS at 18:39

## 2018-09-07 RX ADMIN — PANTOPRAZOLE SODIUM 40 MG: 40 TABLET, DELAYED RELEASE ORAL at 08:01

## 2018-09-07 RX ADMIN — ATORVASTATIN CALCIUM 20 MG: 20 TABLET, FILM COATED ORAL at 20:13

## 2018-09-07 RX ADMIN — NYSTATIN 1000000 UNITS: 100000 SUSPENSION ORAL at 12:22

## 2018-09-07 RX ADMIN — MYCOPHENOLATE MOFETIL 250 MG: 250 CAPSULE ORAL at 20:13

## 2018-09-07 RX ADMIN — MYCOPHENOLATE MOFETIL 250 MG: 250 CAPSULE ORAL at 08:01

## 2018-09-07 RX ADMIN — Medication 1 CAPSULE: at 08:01

## 2018-09-07 RX ADMIN — Medication 50 MG: at 21:32

## 2018-09-07 RX ADMIN — HYDRALAZINE HYDROCHLORIDE 75 MG: 50 TABLET ORAL at 20:13

## 2018-09-07 RX ADMIN — NYSTATIN 1000000 UNITS: 100000 SUSPENSION ORAL at 20:13

## 2018-09-07 RX ADMIN — TACROLIMUS 2.5 MG: 1 CAPSULE ORAL at 08:00

## 2018-09-07 ASSESSMENT — ACTIVITIES OF DAILY LIVING (ADL)
ADLS_ACUITY_SCORE: 12

## 2018-09-07 ASSESSMENT — PAIN DESCRIPTION - DESCRIPTORS: DESCRIPTORS: DULL;ACHING

## 2018-09-07 NOTE — PLAN OF CARE
Problem: Patient Care Overview  Goal: Plan of Care/Patient Progress Review  D:  Pt admitted 8/12 w/ hematochezia, 8/14 colostomy  I/A:  VSS on room air, SR/SR on tele.  Up SBA.  Dialysis today.  Ostomoy drainage dark green/brown.  Neutropenic precaution.  Per NP note pt no sepsis/lactic acid needed to be drawn for 48 hrs.  Encourage activity/ambulation.   P:  Scheduled to discharge to  TCU at 1400 tomorrow via [facility] per SW note.  Continue with plan of care and notify team w/ changes/concerns.

## 2018-09-07 NOTE — PROGRESS NOTES
Transplant Social Work Services Progress Note      Collaborated with: Murray, Cards 2, Rehab Admissions    Data: SW informed that Murray is medically ready to dc to rehab from Cards 2 perspective. [facility] denied admission due to his low WBC. They are willing to reconsider over the weekend if WBC is trending up.   Intervention: Met with Murray to update. Provided $50 gas cards to Murray for his friend who is willing to transport him to and from dialysis.   Assessment: Murray is agreeable to dc when it happens. He is working hard with PT/OT and may progress to being able to dc straight home.   Education provided by SW: dc process  Plan:    Discharge Plans in Progress: [facility]CU vs Home    Barriers to d/c plan: WBC    Follow up Plan: SW will leave report for weekend SW.    pager [phone]

## 2018-09-07 NOTE — PLAN OF CARE
Problem: Patient Care Overview  Goal: Plan of Care/Patient Progress Review  OT6C:  Discharge Planner OT   Patient plan for discharge: Rehab   Current status: Pt supine<>sit EOB SBA, seated EOB pt donned bilateral socks using figure 4 technique SBA. Pt sit<>stand CGA, ambulated ~215'x2 to rehab gym and back with no AE and not needing seated rest break. Pt completed 20 continuous minutes of exercise using NuStep machine at [facility]. Pt BP: 121/72(85) upon completion of exercise, SpO2% >90 throughout on RA.   Barriers to return to prior living situation: Medical status, large number of stairs at home, lives alone, poor activity tolerance limiting functional ambulation and completion of I/ADLs   Recommendations for discharge: TCU  Rationale for recommendations: Pt lives on his own and has many stairs to enter home. Would benefit from continued therapy to progress functional mobility and IND with I/ADLs       Entered by: Ashlie Puente 09/07/2018 3:10 PM

## 2018-09-07 NOTE — PLAN OF CARE
Problem: Patient Care Overview  Goal: Plan of Care/Patient Progress Review  Discharge Planner PT   Patient plan for discharge: TCU  Current status: patient presenting with proximal muscle weakness impacting indep with flat bed mobility, transfers, and stairs. Attempted flat bed mobility this day, pt needed use of bed rail and momentum to swing self up to sitting at EOB (not safest way to perform bed mobility). Attempted completion of stairs; pt with difficulty d/t weakness. Needs CGA - Ilsa and use of 2 railings to complete 2 stairs. Pt has 24 stairs to access apartment with only 1 railing. Not safe to dc home at this time.   Barriers to return to prior living situation: level of assist, medical stability  Recommendations for discharge: TCU  Rationale for recommendations: pt would benefit from TCU to progress indep with bed mobility, transfers, and stairs       Entered by: Scarlet Tavarez 09/07/2018 3:24 PM

## 2018-09-07 NOTE — PLAN OF CARE
Problem: Patient Care Overview  Goal: Plan of Care/Patient Progress Review  Outcome: Improving  D: Patient with history of NICM s/p OHT 6/14/16 admitted for colonic abscess, rectal abscess and colitis on 8/12/18.     I/A: Monitored vitals and assessed patient status. A0x4. VSS. NSR 90's. Afebrile. Urinating adequately. +BM via ostomy. Ambulating with standby assist in hallway. Magnesium = 1.5, supplemented with 4 grams per protocol. Unable to transfer to TCU today as patient is still very neutropenic and was denied admission.    P: Continue to monitor patient status and report changes to treatment team. TCU will reconsider admission over the weekend if WBC count trending up. Plan for dialysis Saturday.

## 2018-09-07 NOTE — PROGRESS NOTES
[facility]   Cardiology II Service / Advanced Heart Failure  Daily Progress Note  Date of Service: 9/7/2018      Patient: Murray Nicholson  MRN: [id number]  Admission Date: 8/12/2018  Hospital Day # 26    Assessment and Plan: Murray Nicholson is a 63 year old male with a PMH of NICM s/p OHT 6/14/18, ESRD on HD, RIJ thrombus, and DM Type II. He presented with hematochezia secondary to colonic abscess, rectal abscess, and colitis.       Leukopenia.  Peripheral smear with no acute concerns 8/31. Valcyte discontinued 9/5/18.   - WBC-1.2 with ANC 0.8. Neutropenic precautions. Defer GCFS per Dr. Ernst at this time. Blood culture NTD.   - Cellcept decreased to 250 mg po BID.   - Histoplasmosis urine antigen pending.   - Immunknow pending.      S/p OHT. 6/14/18 secondary to NICM. Echo 7/20/18 with normal graft function. RHC 9/5/18 with mRA-4, mPA-20, mPCW-10, BECKA CO-10, and BECKA CI-5 with biopsy negative.   Serostatus: HSV1-, HSV1+, CMV D+/R-, EBV D?/R+, VZV+.  Immunosuppression: Cellcept 250 mg po BID, Prednisone decreased to 5 mg po BID. Tac level pending. Goal-6-8.   Prophylaxis:Nystatin. Pentamidine last given 8/17 with repeat dose recommended 9/14. Valcyte discontinued, weekly CMV with next due 9/13/18.  - Continue Lipitor.   - ASA remains on hold as above.       HTN. Lisinopril held 8/6/18 due to concern for anemia exacerbation.   - Continue Hydralazine 75 mg po QID. Norvasc 7.5 mg po daily.       ESRD on HD. Remains inactive on renal transplant list.   - Appreciate Nephrology consult with HD every TTS. Note no fluid removal today.     Pericolonic abscess, rectal abscess, and Colitis. S/p laparoscopic sigmoid colectomy with end colostomy 8/14 and drain to rectal abscess 8/12. Biopsy positive for VRE. Completed Cipro and Flagyl course 8/14-8/27. CT enterography 8/30 negative for abscess with improvement in dilated bowel. ID and CRS signed off. Blood cultures negative.   - Miralax daily prn.   - Low residue  diet.   - Follow up with CRS outpatient.     Acute on Chronic Anemia. Likely acute exacerbation in setting of colitis. ASA and Heparin gtt held since 8/22. Received 1 unit PRBC 8/29. Lisinopril held in setting of anemia exacerbation. Per CRS, no scope due to risk of disruption anastomosis. Peripheral smear with no acute concerns 8/31.  - Hgb stable at 8.1.   - Continue Epo per renal.     Right internal jugular thrombus. Last noted per US 8/12/18, nonocclusive. Repeat US 9/5/18 notes nonocclusive thrombus to the lower right internal jugular.    - Eliquis remains on hold given acute bleed and low Hgb. Change in stools this afternoon, defer resumption and continue to monitor Hgb.       Non-Severe Protein Calorie Malnutrition. Albumin 1.9 9/3/18.  - Appreciate Nutrition consult.   - oral intake improving.       Chronic Medical Conditions:  Pseudomonas bacteremia secondary to necrotic oral ulcer.   Pulmonary Nodules. Stable RLL nodules measuring 5 mm and 3 mm per CT 8/12 and 8/29. Repeat Chest CT due 9/16.  DM Type II. Lantus and Medium sliding scale insulin.   CDIF. Completed of 10 day course of oral Vanco 9/6/18.      FEN: Low fiber diet   PROPHY:  Held in setting of bleed. Protonix.   DISPO:  Ok for transfer to TCU. TCU deferred discharge secondary to Leukopenia.   CODE STATUS: Full Code   ================================================================    Interval History/ROS: He feels well today. He denies fever, chills, chest pain, palpitations, MEADE, SOB, nausea, vomiting, diarrhea, melena, hematochezia, and hematemesis. He complains of LE edema, but notes improvement today. He is tolerating oral intake and ambulation well.     Last 24 hr care team notes reviewed.   ROS:  4 point ROS including Respiratory, CV, GI and , other than that noted in the HPI, is negative.     Medications: Reviewed in EPIC.     Physical Exam:   BP (!) 148/99 (BP Location: Left arm)  Pulse 106  Temp 98.3  F (36.8  C) (Oral)  Resp 18   "Ht 1.753 m (5' 9\")  Wt 79.2 kg (174 lb 9.7 oz)  SpO2 100%  BMI 25.78 kg/m2  GENERAL: Appears alert and oriented times three.   HEENT: Eye symmetrical and free of discharge bilaterally. Mucous membranes moist and without lesions.  NECK: Supple and without lymphadenopathy. JVD below clavicular line upright.   CV: RRR, S1S2 present without murmur, rub, or gallop.   RESPIRATORY: Respirations regular, even, and unlabored. Lungs CTA throughout.   GI: Soft and non distended with normoactive bowel sounds present in all quadrants. No tenderness, rebound, guarding. No organomegaly.   EXTREMITIES: No peripheral edema. 2+ bilateral pedal pulses.   NEUROLOGIC: Alert and orientated x 3. CN II-XII grossly intact. No focal deficits.   MUSCULOSKELETAL: No joint swelling or tenderness.   SKIN: No jaundice. No rashes or lesions.     Data:  CMP  Recent Labs  Lab 09/07/18  0534 09/06/18  0705 09/05/18  0642 09/04/18  0712 09/03/18  0616    140 138 136 136   POTASSIUM 3.9 4.5 4.1 4.3 4.0   CHLORIDE 99 106 106 102 100   CO2 31 28 28 26 28   ANIONGAP 6 6 5 8 8   GLC 78 132* 114* 150* 100*   BUN 12 24 16 29 22   CR 3.49* 5.48* 4.17* 5.95* 4.67*   GFRESTIMATED 18* 11* 14* 10* 13*   GFRESTBLACK 22* 13* 18* 12* 15*   TRINI 8.0* 8.0* 8.2* 8.0* 7.8*   MAG 1.5*  --  1.7  --  1.8   PHOS 1.7*  --  2.2*  --  2.2*   PROTTOTAL  --   --   --   --  5.2*   ALBUMIN  --   --   --   --  1.9*   BILITOTAL  --   --   --   --  0.5   ALKPHOS  --   --   --   --  138   AST  --   --   --   --  17   ALT  --   --   --   --  14     CBC  Recent Labs  Lab 09/07/18  0534 09/06/18  1826 09/06/18  0705 09/05/18  0642 09/04/18  0712   WBC 1.2*  --  1.2* 1.6* 2.0*   RBC 2.56*  --  2.58* 2.67* 2.59*   HGB 8.1* 8.1* 8.2* 8.5* 8.1*   HCT 25.7*  --  26.3* 27.2* 26.2*     --  102* 102* 101*   MCH 31.6  --  31.8 31.8 31.3   MCHC 31.5  --  31.2* 31.3* 30.9*   RDW 21.8*  --  22.0* 21.9* 21.3*   *  --  131* 120* 115*     INR  Recent Labs  Lab 09/03/18  0616   INR " 1.05       Patient discussed with Dr. Austin.     Jennifer FISHER  9/7/2018

## 2018-09-07 NOTE — PROGRESS NOTES
CLINICAL NUTRITION SERVICES - REASSESSMENT NOTE      Nutrition Prescription    RECOMMENDATIONS FOR MDs/PROVIDERS TO ORDER:  Rec replace lytes individually or correct in dialysis, as able. Mg++ and phos are low.      Malnutrition Status:    Non-severe malnutrition in the context of acute illness/injury on chronic illness    Future/Additional Recommendations:  1. Continue diet as per surgery team.   2. Monitor glycemic control. DM II hx, Endo has followed in the past. Low BG this admission, at times. Hgb A1c of 7 on both 7/18/18 and previously on 4/26/18.   3. Assess if pt needs calcium/vitamin D, if remains on prednisone. Per nephrology post-op Tx, pt was getting calcium during dialysis and calcitriol during runs.   4. Continue nephrocaps, as ordered.   5. If TFs are needed, see nutrition note 8/24 for recs. If parenteral nutrition (PN) is needed, see nutrition assessment note 8/13 for central PN recs via a central line, to provide 100% of nutrition needs at goal vs restart recent peripheral PN regimen.      EVALUATION OF THE PROGRESS TOWARD GOALS   Diet: Low fiber diet order since 9/5. Pt is NPO at times, due to tests/procedures. Low fiber diet 8/28-9/4. Pt has an order for chocolate Boost Plus at HS.    Intake: Good diet tolerance. Flowsheets indicate pt consuming 50% of meals with a fair appetite 8/31, 75% of meals with a fair to good appetite 9/2, % of meals with a fair to good appetite 9/3, fair appetite and consuming 50-75% of meals 9/4, % of meals with a good appetite 9/5, % of meals with a good appetite 9/6, and 75% of meals with a good appetite 9/7. Per pt, his appetite is improving and he is eating more than a week ago. Factor affecting oral intake include restrictive diet, LOS, and food choices. States he is finding more protein items to choose on the menu, such as sausage and meatloaf, which are more moist than the chicken. He consistently consumes one chocolate Boost Plus, which he  sips. Pt states he is watching cooking shows and getting ideas for recipes and things to cook at home. Looks forward to purchasing an Instant Pot. Feels his strength has improved and he is able to walk more.       Kcal counts:   9/1   803 kcals and 24 g protein (Two meal/s and 50% of Boost Plus supplement/s recorded)   9/2   1068 kcals and 24 g protein (Three meal/s and no supplement/s recorded)   9/3   2027 kcals and 66 g protein (Three meal/s and no supplement/s recorded)   * Pt consumed a three-day average of 1299 kcals and 38 g protein daily. Not meeting estimated needs of 2853-9069 kcals/day (25 - 30 kcals/kg) and  grams protein/day (1.2 - 1.8 grams of pro/kg).       NEW FINDINGS   N/A    MALNUTRITION  % Intake: Not meeting this criteria.     % Weight Loss: Difficult to assess wt changes due to changes in fluid status, pt on dialysis.   Subcutaneous Fat Loss: Facial region: mild  Muscle Loss: Temporal, Scapular bone and Thoracic region (clavicle, acromium bone, deltoid, trapezius, pectoral): mild, Posterior calf: Moderate  Fluid Accumulation/Edema: Does not meet criteria  Malnutrition Diagnosis: Non-severe malnutrition in the context of acute illness/injury on chronic illness    Previous Goals   Patient to consume % of nutritionally adequate meal trays TID, or the equivalent with supplements/snacks.  Evaluation: Meeting at times    Previous Nutrition Diagnosis  Inadequate oral intake related to diet restrictions,early satiety, and food choices as evidenced by pt consuming % of meals.  Evaluation: Improving. Updated below.    CURRENT NUTRITION DIAGNOSIS  Inadequate oral intake related to restrictive diet, LOS, and food choices as evidenced by pt consuming 50% of meals at times.      INTERVENTIONS  Implementation  Nutrition education for nutrition relationship to health/disease: Answered questions about low fiber diet. Reinforced length of low fiber diet (6-8 weeks post-op). Encouraged oral  intake adequacy.     Goals  Patient to consume % of nutritionally adequate meal trays TID, or the equivalent with supplements/snacks.    Monitoring/Evaluation  Progress toward goals will be monitored and evaluated per protocol.     Nutrition will continue to follow.     Mary Silva MS, RD, LD, [facility]   6C Pgr: [phone]

## 2018-09-07 NOTE — PROGRESS NOTES
Care Coordinator Progress Note    Admission Date/Time:  8/12/2018  Attending MD:  Arcelia Blum    Data  Chart reviewed, discussed with interdisciplinary team.   Patient was admitted for:    Heart transplant recipient (H)  Fever, unspecified fever cause  Chills  Hypoglycemia  Fever and chills  Type 2 diabetes mellitus with hypoglycemia without coma, unspecified long term insulin use status (H)  Blood in stool  Bacteremia due to Pseudomonas  Heart replaced by transplant (H).    Assessment  Concerns with insurance coverage for discharge needs: None.  Current Living Situation: Patient lives alone.  Support System: Supportive and Involved   Services Involved: [facility] (Ph: [phone] Fax: [phone])  Transportation at Discharge: Family or friend will provide  Transportation to Medical Appointments: Friends  Barriers to Discharge: Safe discharge disposition (rec is TCU, was deferred admission today due to leukopenia.    Coordination of Care and Referrals: Provided patient/family with options for resumption of outpatient dialysis. This writer updated [facility] that pt will not be resuming outpatient dialysis tomorrow as previously planned. Will continue to update on discharge planning.      Intervention  Arrangements made with [facility] (Ph: [phone] Fax: [phone]) for resumption of outpatient dialysis T/Th/Sat.      Plan  Anticipated Discharge Date: TBD  Anticipated Discharge Plan: TBD (TCU vs home, pending progress)  CC will continue to monitor patient's medical condition and progress towards discharge.  Kavitha Bates RN BSN  6C Unit Care Coordinator  Phone number: [phone]  Pager: [phone]

## 2018-09-07 NOTE — PLAN OF CARE
Problem: Patient Care Overview  Goal: Plan of Care/Patient Progress Review  D:  Pt admitted 8/12 w/ hematochezia, 8/14 colostomy. Heart transplant June 2018.  I/A:  VSS on room air, SR/SR on tele.  Up SBA.  Dialysis T/Th/Sat.  Ostomy drainage brown.  Neutropenic precaution.  Per NP note pt no sepsis/lactic acid needed to be drawn for 48 hrs.  Encourage activity/ambulation. Tramadol given at hs.  P:  Scheduled to discharge to  TCU at 1400 tomorrow via [facility] per SW note.  Continue with plan of care and notify team w/ changes/concerns.

## 2018-09-08 ENCOUNTER — APPOINTMENT (OUTPATIENT)
Dept: PHYSICAL THERAPY | Facility: CLINIC | Age: 63
DRG: 329 | End: 2018-09-08
Payer: MEDICARE

## 2018-09-08 LAB
ANION GAP SERPL CALCULATED.3IONS-SCNC: 7 MMOL/L (ref 3–14)
BACTERIA SPEC CULT: NO GROWTH
BACTERIA SPEC CULT: NO GROWTH
BASOPHILS # BLD AUTO: 0 10E9/L (ref 0–0.2)
BASOPHILS NFR BLD AUTO: 0 %
BUN SERPL-MCNC: 20 MG/DL (ref 7–30)
CALCIUM SERPL-MCNC: 8 MG/DL (ref 8.5–10.1)
CHLORIDE SERPL-SCNC: 98 MMOL/L (ref 94–109)
CO2 SERPL-SCNC: 30 MMOL/L (ref 20–32)
CREAT SERPL-MCNC: 4.61 MG/DL (ref 0.66–1.25)
DIFFERENTIAL METHOD BLD: ABNORMAL
EOSINOPHIL # BLD AUTO: 0.1 10E9/L (ref 0–0.7)
EOSINOPHIL NFR BLD AUTO: 4.8 %
ERYTHROCYTE [DISTWIDTH] IN BLOOD BY AUTOMATED COUNT: 21.5 % (ref 10–15)
GFR SERPL CREATININE-BSD FRML MDRD: 13 ML/MIN/1.7M2
GLUCOSE BLDC GLUCOMTR-MCNC: 123 MG/DL (ref 70–99)
GLUCOSE BLDC GLUCOMTR-MCNC: 74 MG/DL (ref 70–99)
GLUCOSE BLDC GLUCOMTR-MCNC: 77 MG/DL (ref 70–99)
GLUCOSE BLDC GLUCOMTR-MCNC: 87 MG/DL (ref 70–99)
GLUCOSE SERPL-MCNC: 110 MG/DL (ref 70–99)
HCT VFR BLD AUTO: 26.3 % (ref 40–53)
HGB BLD-MCNC: 8.3 G/DL (ref 13.3–17.7)
IMM GRANULOCYTES # BLD: 0 10E9/L (ref 0–0.4)
IMM GRANULOCYTES NFR BLD: 0 %
LYMPHOCYTES # BLD AUTO: 0.3 10E9/L (ref 0.8–5.3)
LYMPHOCYTES NFR BLD AUTO: 26 %
Lab: NORMAL
Lab: NORMAL
MCH RBC QN AUTO: 31.3 PG (ref 26.5–33)
MCHC RBC AUTO-ENTMCNC: 31.6 G/DL (ref 31.5–36.5)
MCV RBC AUTO: 99 FL (ref 78–100)
MONOCYTES # BLD AUTO: 0.1 10E9/L (ref 0–1.3)
MONOCYTES NFR BLD AUTO: 5.8 %
NEUTROPHILS # BLD AUTO: 0.7 10E9/L (ref 1.6–8.3)
NEUTROPHILS NFR BLD AUTO: 63.4 %
NRBC # BLD AUTO: 0 10*3/UL
NRBC BLD AUTO-RTO: 0 /100
PLATELET # BLD AUTO: 139 10E9/L (ref 150–450)
POTASSIUM SERPL-SCNC: 3.8 MMOL/L (ref 3.4–5.3)
RBC # BLD AUTO: 2.65 10E12/L (ref 4.4–5.9)
SODIUM SERPL-SCNC: 134 MMOL/L (ref 133–144)
SPECIMEN SOURCE: NORMAL
SPECIMEN SOURCE: NORMAL
WBC # BLD AUTO: 1 10E9/L (ref 4–11)

## 2018-09-08 PROCEDURE — 97116 GAIT TRAINING THERAPY: CPT | Mod: GP

## 2018-09-08 PROCEDURE — 25000131 ZZH RX MED GY IP 250 OP 636 PS 637: Mod: GY | Performed by: NURSE PRACTITIONER

## 2018-09-08 PROCEDURE — 25000125 ZZHC RX 250: Performed by: INTERNAL MEDICINE

## 2018-09-08 PROCEDURE — 97110 THERAPEUTIC EXERCISES: CPT | Mod: GP

## 2018-09-08 PROCEDURE — 21400006 ZZH R&B CCU INTERMEDIATE UMMC

## 2018-09-08 PROCEDURE — 25000128 H RX IP 250 OP 636: Performed by: INTERNAL MEDICINE

## 2018-09-08 PROCEDURE — A9270 NON-COVERED ITEM OR SERVICE: HCPCS | Mod: GY | Performed by: NURSE PRACTITIONER

## 2018-09-08 PROCEDURE — 25000132 ZZH RX MED GY IP 250 OP 250 PS 637: Mod: GY | Performed by: INTERNAL MEDICINE

## 2018-09-08 PROCEDURE — 99232 SBSQ HOSP IP/OBS MODERATE 35: CPT | Performed by: NURSE PRACTITIONER

## 2018-09-08 PROCEDURE — 25000132 ZZH RX MED GY IP 250 OP 250 PS 637: Mod: GY | Performed by: NURSE PRACTITIONER

## 2018-09-08 PROCEDURE — A9270 NON-COVERED ITEM OR SERVICE: HCPCS | Mod: GY | Performed by: INTERNAL MEDICINE

## 2018-09-08 PROCEDURE — A9270 NON-COVERED ITEM OR SERVICE: HCPCS | Mod: GY | Performed by: STUDENT IN AN ORGANIZED HEALTH CARE EDUCATION/TRAINING PROGRAM

## 2018-09-08 PROCEDURE — 25000132 ZZH RX MED GY IP 250 OP 250 PS 637: Mod: GY | Performed by: STUDENT IN AN ORGANIZED HEALTH CARE EDUCATION/TRAINING PROGRAM

## 2018-09-08 PROCEDURE — 40000193 ZZH STATISTIC PT WARD VISIT

## 2018-09-08 PROCEDURE — 36415 COLL VENOUS BLD VENIPUNCTURE: CPT | Performed by: STUDENT IN AN ORGANIZED HEALTH CARE EDUCATION/TRAINING PROGRAM

## 2018-09-08 PROCEDURE — 00000146 ZZHCL STATISTIC GLUCOSE BY METER IP

## 2018-09-08 PROCEDURE — 80048 BASIC METABOLIC PNL TOTAL CA: CPT | Performed by: STUDENT IN AN ORGANIZED HEALTH CARE EDUCATION/TRAINING PROGRAM

## 2018-09-08 PROCEDURE — 85025 COMPLETE CBC W/AUTO DIFF WBC: CPT | Performed by: STUDENT IN AN ORGANIZED HEALTH CARE EDUCATION/TRAINING PROGRAM

## 2018-09-08 PROCEDURE — 25000132 ZZH RX MED GY IP 250 OP 250 PS 637: Mod: GY | Performed by: COLON & RECTAL SURGERY

## 2018-09-08 PROCEDURE — 97530 THERAPEUTIC ACTIVITIES: CPT | Mod: GP

## 2018-09-08 PROCEDURE — 90937 HEMODIALYSIS REPEATED EVAL: CPT

## 2018-09-08 PROCEDURE — 63400005 ZZH RX 634: Performed by: INTERNAL MEDICINE

## 2018-09-08 PROCEDURE — 25000125 ZZHC RX 250: Performed by: NURSE PRACTITIONER

## 2018-09-08 RX ORDER — HYDRALAZINE HYDROCHLORIDE 50 MG/1
50 TABLET, FILM COATED ORAL 4 TIMES DAILY
Status: DISCONTINUED | OUTPATIENT
Start: 2018-09-08 | End: 2018-09-08

## 2018-09-08 RX ORDER — ASPIRIN 81 MG/1
81 TABLET ORAL DAILY
Status: DISCONTINUED | OUTPATIENT
Start: 2018-09-08 | End: 2018-09-11 | Stop reason: HOSPADM

## 2018-09-08 RX ORDER — SODIUM PHOSPHATE,MONOBASIC
4 GRANULES (GRAM) MISCELLANEOUS ONCE
Status: COMPLETED | OUTPATIENT
Start: 2018-09-08 | End: 2018-09-08

## 2018-09-08 RX ADMIN — HYDRALAZINE HYDROCHLORIDE 75 MG: 50 TABLET ORAL at 12:16

## 2018-09-08 RX ADMIN — SODIUM CHLORIDE 250 ML: 9 INJECTION, SOLUTION INTRAVENOUS at 09:03

## 2018-09-08 RX ADMIN — PREDNISONE 5 MG: 5 TABLET ORAL at 09:13

## 2018-09-08 RX ADMIN — ATORVASTATIN CALCIUM 20 MG: 20 TABLET, FILM COATED ORAL at 20:40

## 2018-09-08 RX ADMIN — NYSTATIN 1000000 UNITS: 100000 SUSPENSION ORAL at 20:40

## 2018-09-08 RX ADMIN — NYSTATIN 1000000 UNITS: 100000 SUSPENSION ORAL at 09:11

## 2018-09-08 RX ADMIN — MYCOPHENOLATE MOFETIL 250 MG: 250 CAPSULE ORAL at 09:10

## 2018-09-08 RX ADMIN — SODIUM CHLORIDE 300 ML: 9 INJECTION, SOLUTION INTRAVENOUS at 09:03

## 2018-09-08 RX ADMIN — Medication 4 MG%: at 08:42

## 2018-09-08 RX ADMIN — HYDRALAZINE HYDROCHLORIDE 75 MG: 50 TABLET ORAL at 20:40

## 2018-09-08 RX ADMIN — NYSTATIN 1000000 UNITS: 100000 SUSPENSION ORAL at 16:52

## 2018-09-08 RX ADMIN — NYSTATIN 1000000 UNITS: 100000 SUSPENSION ORAL at 13:41

## 2018-09-08 RX ADMIN — TACROLIMUS 2 MG: 1 CAPSULE ORAL at 19:02

## 2018-09-08 RX ADMIN — ASPIRIN 81 MG: 81 TABLET, COATED ORAL at 16:52

## 2018-09-08 RX ADMIN — AMLODIPINE BESYLATE 7.5 MG: 2.5 TABLET ORAL at 12:15

## 2018-09-08 RX ADMIN — TACROLIMUS 2 MG: 1 CAPSULE ORAL at 09:10

## 2018-09-08 RX ADMIN — EPOETIN ALFA 10000 UNITS: 10000 SOLUTION INTRAVENOUS; SUBCUTANEOUS at 09:04

## 2018-09-08 RX ADMIN — Medication 50 MG: at 22:06

## 2018-09-08 RX ADMIN — HEPARIN SODIUM 3000 UNITS: 1000 INJECTION, SOLUTION INTRAVENOUS; SUBCUTANEOUS at 12:32

## 2018-09-08 RX ADMIN — PREDNISONE 5 MG: 5 TABLET ORAL at 19:02

## 2018-09-08 RX ADMIN — Medication 1 CAPSULE: at 09:13

## 2018-09-08 RX ADMIN — PANTOPRAZOLE SODIUM 40 MG: 40 TABLET, DELAYED RELEASE ORAL at 09:14

## 2018-09-08 RX ADMIN — Medication: at 09:05

## 2018-09-08 ASSESSMENT — ACTIVITIES OF DAILY LIVING (ADL)
ADLS_ACUITY_SCORE: 12

## 2018-09-08 ASSESSMENT — PAIN DESCRIPTION - DESCRIPTORS: DESCRIPTORS: ACHING;DULL

## 2018-09-08 NOTE — PROGRESS NOTES
HEMODIALYSIS TREATMENT NOTE    Date: 9/8/2018  Time: 5:25 PM    Data:  Pre Wt: 80 kg (176 lb 5.9 oz)   Desired Wt: 79.5 kg   Post Wt: 79.3 kg (174 lb 13.2 oz) Post HD  Weight gain: -0.7 kg off   Weight change: 0.7 kg  Ultrafiltration - Post Run Net Total Removed (mL): 500 mL  Ultrafiltration - Post Run Net Total Gain (mL): 0 mL  Vascular Access Status: Yes, secured and visible  Dialyzer Rinse: Streaked, Light  Total Blood Volume Processed: 72.5 L  Total Dialysis (Treatment) Time:  3.5 hrs    Lab:   NO    Assessment:  Patent CVC (Lt) , Pre run K/Ca: 3.8/ 8.0, P:1.7.    Interventions:  Initiate HD with K 3/3 bath. 4 % Sodium phosphate added to bicarbonate bath. Set BFR at 350 ml/mins. Stable and tolerable for 3.5 hrs run, Pre meal B/S:123 at 9;54am. Gave lantus but hold for Novolog. Meal served during HD. Gave morning meds except anti hypertensive meds. Gave epogen at the end of run. Finished HD with rinse back , CVC locked with 1:1000 units heparin each port. Then gave anti-hypertensive po meds. (See MAR)     Plan:    Next run per renal team.

## 2018-09-08 NOTE — PROGRESS NOTES
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 Right TDC, EDW increased to 79.5 kg   - Will continue TTS schedule   - No heparin   - list for kidney transplant with wait time dating from 3/24/16 - currently inactive as too ill to undergo kidney transplantation surgery, he will not get organ offers but he will continue to accrue wait time      Volume status: EDW 79.5 kg. Though he has edema, his RHC 9/5 with RA 4 and wedge 10    - Continue pre run weights, standing   - Strict I/O  -  EDW 79.5 kg based on RHC from 9/5 (discussed with cardiology, ideally RA of 7; will continue with current EDW for now)     Hypertension: 130-150's  Could consider ACEi though has side effect of anemia and not ideal in this patient with current epo resistance and ongoing requirement of PRBC.    - Per cards, on hydralazine 75 mg qid, amlodipine 5 mg qday     Transplant IS regimen:  Per cardiology: Cellcept 500 mg po BID, Prednisone 10 mg in AM and 5 mg at HS. Tac level pending. Goal-6-8. If 9/5 biopsy negative plan is to decrease Prednisone per protocol and consider reduction in Cellcept if no improvement in Leukopenia.       BMD: Ca 8.0, alb 1.8, phos 1.7  - Phos supplement added to his dialysis today    GIB: resolved    Anemia: hgb 8.3; last transfusion 8/29.    - continue epogen to 10,000 units      Pericolonic abscess, small perircal abscess: Underwent I/D anorectal abscess on 8/13 and  " Sigmoidectomy/end colostomy on 8/14.      C diff: on PO vanc completed 9/6      Lung nodules - New Right lower lobe pulmonary nodules seen on CT this admission. ID recommending close f/u.  Has repeat CT 9/16.        Recommendations were communicated to primary team via progress note     VERONICA Cagle, CNP  Division of Kidney Disease  Pager [phone]          Interval History :   Seen on dialysis, stable run. Denies CP, SOB, n/v.       Review of Systems:   4 point ROS negative other than as noted above.    Physical Exam:   I/O last 3 completed shifts:  In: 1023 [P.O.:920; I.V.:103]  Out: 350 [Urine:350]   /88  Pulse 106  Temp 98.2  F (36.8  C) (Oral)  Resp 18  Ht 1.753 m (5' 9\")  Wt 80 kg (176 lb 5.9 oz)  SpO2 99%  BMI 26.05 kg/m2     GENERAL APPEARANCE: alert, NAD  EYES: no scleral icterus, pupils equal  Pulmonary: normal work of breathing  CV: WWP   - Edema 1-2+ upper and lower extremity edema  NEURO: alert/oriented, face symmetric and speech is fluent  ACCESS: Right TDC    Labs:   All labs reviewed by me  Electrolytes/Renal -   Recent Labs   Lab Test  09/08/18   0612  09/07/18   1819  09/07/18   0534  09/06/18   0705  09/05/18   0642   09/03/18   0616   NA  134   --   136  140  138   < >  136   POTASSIUM  3.8   --   3.9  4.5  4.1   < >  4.0   CHLORIDE  98   --   99  106  106   < >  100   CO2  30   --   31  28  28   < >  28   BUN  20   --   12  24  16   < >  22   CR  4.61*   --   3.49*  5.48*  4.17*   < >  4.67*   GLC  110*   --   78  132*  114*   < >  100*   TRINI  8.0*   --   8.0*  8.0*  8.2*   < >  7.8*   MAG   --   2.6*  1.5*   --   1.7   --   1.8   PHOS   --    --   1.7*   --   2.2*   --   2.2*    < > = values in this interval not displayed.       CBC -   Recent Labs   Lab Test  09/08/18   0612  09/07/18   0534  09/06/18   1826  09/06/18   0705   WBC  1.0*  1.2*   --   1.2*   HGB  8.3*  8.1*  8.1*  8.2*   PLT  139*  126*   --   131*       LFTs -   Recent Labs   Lab Test  09/03/18   0616  " 08/31/18   0646  08/27/18   0644   ALKPHOS  138  133  133   BILITOTAL  0.5  0.5  0.5   ALT  14  14  13   AST  17  17  16   PROTTOTAL  5.2*  4.7*  5.2*   ALBUMIN  1.9*  1.6*  1.8*       Iron Panel -   Recent Labs   Lab Test  07/10/18   0711  05/08/18   0954  07/19/17   1306   IRON  66  58  46   IRONSAT  28  27  18   ALBERTINA  771*  621*  369       Current Medications:    amLODIPine  7.5 mg Oral Daily     atorvastatin  20 mg Oral QPM     fluticasone  1-2 spray Both Nostrils Daily     gelatin absorbable  1 each Topical During Hemodialysis (from stock)     heparin  3 mL Intracatheter During Hemodialysis (from stock)     heparin  3 mL Intracatheter During Hemodialysis (from stock)     hydrALAZINE  75 mg Oral Q8H JEAN     insulin aspart  1-6 Units Subcutaneous TID w/meals     insulin aspart  1-5 Units Subcutaneous At Bedtime     insulin glargine  20 Units Subcutaneous QAM AC     mycophenolate  250 mg Oral BID     NEPHROCAPS  1 capsule Oral Daily     nystatin  1,000,000 Units Swish & Swallow 4x Daily     pantoprazole  40 mg Oral QAM AC     predniSONE  5 mg Oral BID w/meals     sodium chloride (PF)  3 mL Intracatheter During Hemodialysis (from stock)     sodium chloride (PF)  3 mL Intracatheter During Hemodialysis (from stock)     sodium chloride (PF)  3 mL Intracatheter Q8H     tacrolimus  2 mg Oral BID IS     triamcinolone   Topical BID       IV fluid REPLACEMENT ONLY       VERONICA Robbins CNP-C

## 2018-09-08 NOTE — PLAN OF CARE
Problem: Patient Care Overview  Goal: Plan of Care/Patient Progress Review  Outcome: No Change  Pt admit 8/12 with colonic + rectal abscess + colitis, s/p colostomy 8/14. Pt A/O. See flowsheets for VS (BPs intermittently elevated, before/in/following HD - Cards 2 texted paged an FYI regarding this evening). NSR. RA. BG assessed and managed per protocol. Colostomy bag intact. Sepsis protocol triggered this AM, sepsis lab not ordered per verbal from Jennifer Cards 2 NP (also see pt care order). Continues with neutropenic precautions. Up with therapy. Continue with plan of care and report changes to treatment team.

## 2018-09-08 NOTE — PLAN OF CARE
Problem: Patient Care Overview  Goal: Plan of Care/Patient Progress Review  Discharge Planner PT   Patient plan for discharge: TCU  Current status: Patient making good progress this day. Very motivated to potentially dc home, working hard with PT. Patient performs sit to stand transfers independently. Progressing to 6 stairs x2 with B HR and CGA-Ilsa. Fatigues quickly d/t proximal muscle weakness.  Barriers to return to prior living situation: level of assist, stairs (21-24 to access 2nd floor apartment)  Recommendations for discharge: TCU  Rationale for recommendations: pt with functional weakness, proximal muscle strength deficits, decreased tolerance to activity with 24 stairs to access home; would benefit from continued skilled PT to address deficits in order to return to maximal functional potential        Entered by: Scarlet Tvaarez 09/08/2018 3:12 PM

## 2018-09-08 NOTE — PROGRESS NOTES
"  WOC Nurse Inpatient [facility]   WOC Nurse Inpatient Adult     Follow Up Assessment   Assessment of new end Colostomy Stoma complication(s) none   Mucocutaneous junction; separation non gapping at 3 and 9 o'clock   Peristomal complication(s) none   Pouch wear time:3-4 days,   Following today's visit:Patient /   is  able to demonstrate;       1. How to empty their pouch? yes      2. How to change their pouch?  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     Physical Exam:  Current pouching system: Coloplast 1 piece flat   Reason for pouch change today: ostomy education and routine schedule  Stoma appearance: red, oval and moist  Stoma size; 1 3/8\" x 1 7/8\" with good protrusion ,    Peristomal skin: intact, horizontal crease along inferior edge of stoma   Stoma output :brown and pasty   Abdominal  Assessment  firm , NG still in place? No  Surgical Site: open to air  Pain: Dull ache  Is patient still on a PCA No    Interventions:  Patient's chart evaluated.  Focus of today's visit: refitting of appliance, pouch change return demonstration and discharge instructions   Preparation for discharge: Supplies ordered, Completed supply list, Registered for samples from , Prescriptions or note left on chart for MD to sign/complete, Discussed making a WOC Nurse outpatient appointment upon discharge, Discussed when to follow up with a WOC Nurse in the future and Discussed how/ where to order " supplies  Participant of teaching session today patient   Orders: Written  Change made with ostomy management today: Yes:  Started sample of flat cut to fit [place] 1 piece with ostomy paste, barrier ring to fill in crease   Patient/family: performed with verbal cues  Supplies: at bedside     Plan:  Learning needs: pouch change practice     Will be discharging to TCU     Discussed plan of care with Patient and Nurse  Nursing to notify the Provider(s) and re-consult the WOC Nurse if new ostomy concerns or discharge planned before next planned WOC visit.    WOC Nurse will return: Tues  Face to face time: 45 minutes

## 2018-09-08 NOTE — PROGRESS NOTES
[facility]   Cardiology II Service / Advanced Heart Failure  Daily Progress Note  Date of Service: 9/8/2018      Patient: Murray Nicholson  MRN: [id number]  Admission Date: 8/12/2018  Hospital Day # 27    Assessment and Plan: Murray Nicholson is a 63 year old male with a PMH of NICM s/p OHT 6/14/18, ESRD on HD, RIJ thrombus, and DM Type II. He presented with hematochezia secondary to colonic abscess, rectal abscess, and colitis.       Leukopenia.  Peripheral smear with no acute concerns 8/31. Valcyte discontinued 9/5/18.   - WBC-1. with ANC 0.7. Neutropenic precautions. Blood culture NTD.   - Cellcept discontinued.   - Histoplasmosis urine antigen negative.   - Immunknow pending.      S/p OHT. 6/14/18 secondary to NICM. Echo 7/20/18 with normal graft function. RHC 9/5/18 with mRA-4, mPA-20, mPCW-10, BECKA CO-10, and BECKA CI-5 with biopsy negative.   Serostatus: HSV1-, HSV1+, CMV D+/R-, EBV D?/R+, VZV+.  Immunosuppression: Cellcept discontinued. Prednisone decreased to 5 mg po BID. Tac level 9.5 with dose decreased to 2 mg po BID. Goal-6-8. Recheck level Monday AM.   Prophylaxis:Nystatin. Pentamidine last given 8/17 with repeat dose recommended 9/14. Valcyte discontinued, weekly CMV with next due 9/13/18.  - Continue Lipitor.   - Resume ASA 81 mg po daily.       HTN. Lisinopril held 8/6/18 due to concern for anemia exacerbation.   - Hydralazine decreased to 75 mg po TID. Norvasc 7.5 mg po daily.       ESRD on HD. Remains inactive on renal transplant list.   - Appreciate Nephrology consult with HD every TTS. Nephrology to change bath given low P yesterday.      Pericolonic abscess, rectal abscess, and Colitis. S/p laparoscopic sigmoid colectomy with end colostomy 8/14 and drain to rectal abscess 8/12. Biopsy positive for VRE. Completed Cipro and Flagyl course 8/14-8/27. CT enterography 8/30 negative for abscess with improvement in dilated bowel. ID and CRS signed off. Blood cultures negative.   - Miralax daily  "prn.   - Low residue diet.   - Follow up with CRS outpatient.      Right internal jugular thrombus. Last noted per US 8/12/18, nonocclusive. Repeat US 9/5/18 notes nonocclusive thrombus to the lower right internal jugular.    - Eliquis remains on hold. Repeat US outpatient in 1 month.       Chronic/Resolved Medical Conditions:  Pseudomonas bacteremia secondary to necrotic oral ulcer.   Pulmonary Nodules. Stable RLL nodules measuring 5 mm and 3 mm per CT 8/12 and 8/29. Repeat Chest CT due 9/16.  DM Type II. Lantus and Medium sliding scale insulin.   CDIF. Completed of 10 day course of oral Vanco 9/6/18.  Acute on Chronic Anemia. Likely acute exacerbation in setting of colitis. Heparin gtt held since 8/22. Received 1 unit PRBC 8/29. Lisinopril held in setting of anemia exacerbation. Per CRS, no scope due to risk of disruption of anastomosis. Peripheral smear with no acute concerns 8/31. Hgb remains stable. Continue Epo per renal. Resume ASA in AM.   Non-Severe Protein Calorie Malnutrition. Albumin 1.9 9/3/18. Oral intake improved.       FEN: Low fiber diet   PROPHY:  Held in setting of bleed. Protonix.   DISPO:  Ok for transfer to TCU. TCU deferred discharge secondary to Leukopenia.   CODE STATUS: Full Code   ================================================================  Interval History/ROS: He continues to feel well today. He denies fever, chills, chest pain, palpitations, MEADE, cough, nausea, vomiting, diarrhea, hematochezia, hematemesis, and melena. He notes mild LE edema, but feels this is improving. He continues to progress with therapy, but continues to struggle with stairs. He is tolerating oral intake well.      Last 24 hr care team notes reviewed.   ROS:  4 point ROS including Respiratory, CV, GI and , other than that noted in the HPI, is negative.     Medications: Reviewed in EPIC.     Physical Exam:   BP (!) (P) 154/97  Pulse 106  Temp 98.2  F (36.8  C) (Oral)  Resp 18  Ht 1.753 m (5' 9\")  Wt 80 " kg (176 lb 5.9 oz)  SpO2 (P) 99%  BMI 26.05 kg/m2  GENERAL: Appears alert and oriented times three.   HEENT: Eye symmetrical and free of discharge bilaterally. Mucous membranes moist and without lesions.  NECK: Supple and without lymphadenopathy. JVD below clavicular line upright.   CV: Regular, tachycardic. S1S2 present without murmur, rub, or gallop.   RESPIRATORY: Respirations regular, even, and unlabored. Lungs CTA throughout.   GI: Soft and non distended with normoactive bowel sounds present in all quadrants. No tenderness, rebound, guarding. No organomegaly.   EXTREMITIES: No peripheral edema. 2+ bilateral pedal pulses.   NEUROLOGIC: Alert and orientated x 3. CN II-XII grossly intact. No focal deficits.   MUSCULOSKELETAL: No joint swelling or tenderness.   SKIN: No jaundice. No rashes or lesions.     Data:  CMP  Recent Labs  Lab 09/08/18  0612 09/07/18  1819 09/07/18  0534 09/06/18  0705 09/05/18  0642  09/03/18  0616     --  136 140 138  < > 136   POTASSIUM 3.8  --  3.9 4.5 4.1  < > 4.0   CHLORIDE 98  --  99 106 106  < > 100   CO2 30  --  31 28 28  < > 28   ANIONGAP 7  --  6 6 5  < > 8   *  --  78 132* 114*  < > 100*   BUN 20  --  12 24 16  < > 22   CR 4.61*  --  3.49* 5.48* 4.17*  < > 4.67*   GFRESTIMATED 13*  --  18* 11* 14*  < > 13*   GFRESTBLACK 16*  --  22* 13* 18*  < > 15*   TRINI 8.0*  --  8.0* 8.0* 8.2*  < > 7.8*   MAG  --  2.6* 1.5*  --  1.7  --  1.8   PHOS  --   --  1.7*  --  2.2*  --  2.2*   PROTTOTAL  --   --   --   --   --   --  5.2*   ALBUMIN  --   --   --   --   --   --  1.9*   BILITOTAL  --   --   --   --   --   --  0.5   ALKPHOS  --   --   --   --   --   --  138   AST  --   --   --   --   --   --  17   ALT  --   --   --   --   --   --  14   < > = values in this interval not displayed.  CBC  Recent Labs  Lab 09/08/18  0612 09/07/18  0534 09/06/18  1826 09/06/18  0705 09/05/18  0642   WBC 1.0* 1.2*  --  1.2* 1.6*   RBC 2.65* 2.56*  --  2.58* 2.67*   HGB 8.3* 8.1* 8.1* 8.2* 8.5*    HCT 26.3* 25.7*  --  26.3* 27.2*   MCV 99 100  --  102* 102*   MCH 31.3 31.6  --  31.8 31.8   MCHC 31.6 31.5  --  31.2* 31.3*   RDW 21.5* 21.8*  --  22.0* 21.9*   * 126*  --  131* 120*     INR  Recent Labs  Lab 09/03/18  0616   INR 1.05       Patient discussed with Dr. Shearer.     Jennifer Dean FN  9/8/2018

## 2018-09-08 NOTE — PLAN OF CARE
Problem: Patient Care Overview  Goal: Plan of Care/Patient Progress Review  Outcome: Improving    D: Pt admitted 8/12 with colonic and rectal abscess, colitis. S/p colostomy on 8/14. PMH: NICM s/p heart txp 6/18, ESRD on HD, R internal jugular thrombus, DM II.     I/A: No acute events overnight. A&Ox4. Vital signs stable on RA. BP elevated at hs, refused recheck overnight. Monitor shows sinus rhythm, HR 90's. Abdominal pain managed with PRN Tramadol. Ostomy intact with adequate output. Oliguric on HD. Continues on low fiber diet. Blood sugars managed with sliding scale insulin. Up with assist of 1. Slept throughout the night, making needs known.     P: Plan for dialysis today. Continue to monitor. Notify Cards 2 with changes/concerns.

## 2018-09-08 NOTE — PLAN OF CARE
Problem: Patient Care Overview  Goal: Plan of Care/Patient Progress Review  OT/6C - Cancel. Pt OOR for dialysis and unavailable for therapy. Will reschedule as appropriate.

## 2018-09-09 ENCOUNTER — APPOINTMENT (OUTPATIENT)
Dept: PHYSICAL THERAPY | Facility: CLINIC | Age: 63
DRG: 329 | End: 2018-09-09
Payer: MEDICARE

## 2018-09-09 LAB
ANION GAP SERPL CALCULATED.3IONS-SCNC: 8 MMOL/L (ref 3–14)
BASOPHILS # BLD AUTO: 0 10E9/L (ref 0–0.2)
BASOPHILS NFR BLD AUTO: 0 %
BUN SERPL-MCNC: 12 MG/DL (ref 7–30)
CALCIUM SERPL-MCNC: 7.9 MG/DL (ref 8.5–10.1)
CHLORIDE SERPL-SCNC: 98 MMOL/L (ref 94–109)
CO2 SERPL-SCNC: 29 MMOL/L (ref 20–32)
CREAT SERPL-MCNC: 3.35 MG/DL (ref 0.66–1.25)
DIFFERENTIAL METHOD BLD: ABNORMAL
EOSINOPHIL # BLD AUTO: 0.1 10E9/L (ref 0–0.7)
EOSINOPHIL NFR BLD AUTO: 5.2 %
ERYTHROCYTE [DISTWIDTH] IN BLOOD BY AUTOMATED COUNT: 21.9 % (ref 10–15)
GFR SERPL CREATININE-BSD FRML MDRD: 19 ML/MIN/1.7M2
GLUCOSE BLDC GLUCOMTR-MCNC: 149 MG/DL (ref 70–99)
GLUCOSE BLDC GLUCOMTR-MCNC: 158 MG/DL (ref 70–99)
GLUCOSE BLDC GLUCOMTR-MCNC: 200 MG/DL (ref 70–99)
GLUCOSE BLDC GLUCOMTR-MCNC: 61 MG/DL (ref 70–99)
GLUCOSE BLDC GLUCOMTR-MCNC: 95 MG/DL (ref 70–99)
GLUCOSE SERPL-MCNC: 78 MG/DL (ref 70–99)
HCT VFR BLD AUTO: 26.8 % (ref 40–53)
HGB BLD-MCNC: 8.3 G/DL (ref 13.3–17.7)
IMM GRANULOCYTES # BLD: 0 10E9/L (ref 0–0.4)
IMM GRANULOCYTES NFR BLD: 0 %
LYMPHOCYTES # BLD AUTO: 0.3 10E9/L (ref 0.8–5.3)
LYMPHOCYTES NFR BLD AUTO: 27.8 %
MCH RBC QN AUTO: 31.8 PG (ref 26.5–33)
MCHC RBC AUTO-ENTMCNC: 31 G/DL (ref 31.5–36.5)
MCV RBC AUTO: 103 FL (ref 78–100)
MONOCYTES # BLD AUTO: 0.1 10E9/L (ref 0–1.3)
MONOCYTES NFR BLD AUTO: 6.2 %
NEUTROPHILS # BLD AUTO: 0.6 10E9/L (ref 1.6–8.3)
NEUTROPHILS NFR BLD AUTO: 60.8 %
NRBC # BLD AUTO: 0 10*3/UL
NRBC BLD AUTO-RTO: 0 /100
PLATELET # BLD AUTO: 167 10E9/L (ref 150–450)
PLATELET # BLD EST: ABNORMAL 10*3/UL
POTASSIUM SERPL-SCNC: 3.8 MMOL/L (ref 3.4–5.3)
RBC # BLD AUTO: 2.61 10E12/L (ref 4.4–5.9)
SODIUM SERPL-SCNC: 135 MMOL/L (ref 133–144)
WBC # BLD AUTO: 1 10E9/L (ref 4–11)

## 2018-09-09 PROCEDURE — 25000132 ZZH RX MED GY IP 250 OP 250 PS 637: Mod: GY | Performed by: COLON & RECTAL SURGERY

## 2018-09-09 PROCEDURE — 25000132 ZZH RX MED GY IP 250 OP 250 PS 637: Mod: GY | Performed by: STUDENT IN AN ORGANIZED HEALTH CARE EDUCATION/TRAINING PROGRAM

## 2018-09-09 PROCEDURE — A9270 NON-COVERED ITEM OR SERVICE: HCPCS | Mod: GY | Performed by: INTERNAL MEDICINE

## 2018-09-09 PROCEDURE — 21400006 ZZH R&B CCU INTERMEDIATE UMMC

## 2018-09-09 PROCEDURE — 99232 SBSQ HOSP IP/OBS MODERATE 35: CPT | Performed by: NURSE PRACTITIONER

## 2018-09-09 PROCEDURE — 25000132 ZZH RX MED GY IP 250 OP 250 PS 637: Mod: GY | Performed by: INTERNAL MEDICINE

## 2018-09-09 PROCEDURE — 25000125 ZZHC RX 250: Performed by: NURSE PRACTITIONER

## 2018-09-09 PROCEDURE — 97116 GAIT TRAINING THERAPY: CPT | Mod: GP | Performed by: PHYSICAL THERAPIST

## 2018-09-09 PROCEDURE — A9270 NON-COVERED ITEM OR SERVICE: HCPCS | Mod: GY | Performed by: NURSE PRACTITIONER

## 2018-09-09 PROCEDURE — A9270 NON-COVERED ITEM OR SERVICE: HCPCS | Mod: GY | Performed by: STUDENT IN AN ORGANIZED HEALTH CARE EDUCATION/TRAINING PROGRAM

## 2018-09-09 PROCEDURE — 00000146 ZZHCL STATISTIC GLUCOSE BY METER IP

## 2018-09-09 PROCEDURE — 36415 COLL VENOUS BLD VENIPUNCTURE: CPT | Performed by: NURSE PRACTITIONER

## 2018-09-09 PROCEDURE — 80048 BASIC METABOLIC PNL TOTAL CA: CPT | Performed by: NURSE PRACTITIONER

## 2018-09-09 PROCEDURE — 85025 COMPLETE CBC W/AUTO DIFF WBC: CPT | Performed by: NURSE PRACTITIONER

## 2018-09-09 PROCEDURE — 97530 THERAPEUTIC ACTIVITIES: CPT | Mod: GP | Performed by: PHYSICAL THERAPIST

## 2018-09-09 PROCEDURE — 25000132 ZZH RX MED GY IP 250 OP 250 PS 637: Mod: GY | Performed by: NURSE PRACTITIONER

## 2018-09-09 PROCEDURE — 40000193 ZZH STATISTIC PT WARD VISIT: Performed by: PHYSICAL THERAPIST

## 2018-09-09 PROCEDURE — 25000131 ZZH RX MED GY IP 250 OP 636 PS 637: Mod: GY | Performed by: NURSE PRACTITIONER

## 2018-09-09 RX ADMIN — HYDRALAZINE HYDROCHLORIDE 75 MG: 50 TABLET ORAL at 21:35

## 2018-09-09 RX ADMIN — Medication 50 MG: at 21:35

## 2018-09-09 RX ADMIN — NYSTATIN 1000000 UNITS: 100000 SUSPENSION ORAL at 08:33

## 2018-09-09 RX ADMIN — NYSTATIN 1000000 UNITS: 100000 SUSPENSION ORAL at 12:03

## 2018-09-09 RX ADMIN — NYSTATIN 1000000 UNITS: 100000 SUSPENSION ORAL at 17:25

## 2018-09-09 RX ADMIN — TACROLIMUS 2 MG: 1 CAPSULE ORAL at 17:25

## 2018-09-09 RX ADMIN — PREDNISONE 5 MG: 5 TABLET ORAL at 08:30

## 2018-09-09 RX ADMIN — ATORVASTATIN CALCIUM 20 MG: 20 TABLET, FILM COATED ORAL at 20:19

## 2018-09-09 RX ADMIN — PREDNISONE 5 MG: 5 TABLET ORAL at 17:24

## 2018-09-09 RX ADMIN — INSULIN ASPART 1 UNITS: 100 INJECTION, SOLUTION INTRAVENOUS; SUBCUTANEOUS at 20:17

## 2018-09-09 RX ADMIN — AMLODIPINE BESYLATE 7.5 MG: 2.5 TABLET ORAL at 08:30

## 2018-09-09 RX ADMIN — NYSTATIN 1000000 UNITS: 100000 SUSPENSION ORAL at 20:19

## 2018-09-09 RX ADMIN — HYDRALAZINE HYDROCHLORIDE 75 MG: 50 TABLET ORAL at 13:58

## 2018-09-09 RX ADMIN — ASPIRIN 81 MG: 81 TABLET, COATED ORAL at 08:30

## 2018-09-09 RX ADMIN — INSULIN ASPART 1 UNITS: 100 INJECTION, SOLUTION INTRAVENOUS; SUBCUTANEOUS at 13:53

## 2018-09-09 RX ADMIN — TACROLIMUS 2 MG: 1 CAPSULE ORAL at 08:49

## 2018-09-09 RX ADMIN — PANTOPRAZOLE SODIUM 40 MG: 40 TABLET, DELAYED RELEASE ORAL at 08:30

## 2018-09-09 RX ADMIN — Medication 1 CAPSULE: at 08:30

## 2018-09-09 RX ADMIN — HYDRALAZINE HYDROCHLORIDE 75 MG: 50 TABLET ORAL at 06:12

## 2018-09-09 ASSESSMENT — ACTIVITIES OF DAILY LIVING (ADL)
ADLS_ACUITY_SCORE: 12

## 2018-09-09 ASSESSMENT — PAIN DESCRIPTION - DESCRIPTORS: DESCRIPTORS: ACHING;DULL

## 2018-09-09 NOTE — PLAN OF CARE
Problem: Patient Care Overview  Goal: Plan of Care/Patient Progress Review  Outcome: Improving  D: Pt admitted w/ colonic abscess, rectal abscess, colitis s/p lap sigmoid colectomy w/ end colostomy 8/14 and drain to rectal abscess 8/12.  Hx heart tx 6/14/18.  ESRD, HD TTS.  I: Low fiber diet.  PRN tramadol.    A: Pt endorses abdominal discomfort, controlled w/ tramadol at bedtime.  VSS, see flowsheet.  SR.  Voiding minimally in urinal.  Sleeping b/w cares.  Independent.  ANC 0.7.  Bedtime BG 77, given crackers w/ peanut butter and apple juice.  Pt declining VS/BG overnight.    P: Continue to monitor and notify MDs as necessary of pertinent pt condition changes.  TCU vs home soon.

## 2018-09-09 NOTE — PLAN OF CARE
Problem: Patient Care Overview  Goal: Plan of Care/Patient Progress Review  Discharge Planner PT   Patient plan for discharge: TCU  Current status: PT 6C: PT: Pt ambulated with slowed gait, took standing and seated rest breaks as needed due to deconditioning and LE fatigue. Pt was able to achieve 130 ft, navigated slowly up and down 3 stairs x 4 reps with CGA, B rails. Pt also achieved 150 ft and then walked another 300 ft (seated rest breaks required between walks). VSS.  Barriers to return to prior living situation: level of assist, stairs (21-24 to access 2nd floor apartment)  Recommendations for discharge: TCU  Rationale for recommendations: due to current deficits pt unsafe to return home but would benefit from a TCU stay to maximize his functional return. He is very motivated.        Entered by: Matilda Piedra 09/09/2018 5:26 PM

## 2018-09-09 NOTE — PROGRESS NOTES
[facility]   Cardiology II Service / Advanced Heart Failure  Daily Progress Note  Date of Service: 9/9/2018      Patient: Murray Nicholson  MRN: [id number]  Admission Date: 8/12/2018  Hospital Day # 28    Assessment and Plan: Murray Nicholson is a 63 year old male with a PMH of NICM s/p OHT 6/14/18, ESRD on HD, RIJ thrombus, and DM Type II. He presented with hematochezia secondary to colonic abscess, rectal abscess, and colitis.       Leukopenia.  Peripheral smear with no acute concerns 8/31. Valcyte discontinued 9/5/18. Cellcept discontinued 9/8/18. Histoplasmosis urine antigen negative.   - WBC-0.97. with ANC 0.6. Neutropenic precautions. Blood culture NTD.   - Immunknow pending.      S/p OHT. 6/14/18 secondary to NICM. Echo 7/20/18 with normal graft function. RHC 9/5/18 with mRA-4, mPA-20, mPCW-10, BECKA CO-10, and BECKA CI-5 with biopsy negative.   Serostatus: HSV1-, HSV1+, CMV D+/R-, EBV D?/R+, VZV+.  Immunosuppression: Prednisone 5 mg po BID. Tac level 9.5 with dose decreased to 2 mg po BID 9/7/18. Goal-6-8. Recheck level in AM.   Prophylaxis:Nystatin. Pentamidine last given 8/17 with repeat dose recommended 9/14. Valcyte discontinued, weekly CMV with next due 9/13/18.  - Continue Lipitor.   - ASA 81 mg po daily resumed 9/8/18.      HTN. Lisinopril held 8/6/18 due to concern for anemia exacerbation.   - Hydralazine 75 mg po TID. Norvasc 7.5 mg po daily. Note 2 doses of Hydralazine held yesterday, will monitor BP when gets 24 hours of consistent medications prior to changing dose.       ESRD on HD. Remains inactive on renal transplant list.   - Appreciate Nephrology consult with HD every TTS.       Pericolonic abscess, rectal abscess, and Colitis. S/p laparoscopic sigmoid colectomy with end colostomy 8/14 and drain to rectal abscess 8/12. Biopsy positive for VRE. Completed Cipro and Flagyl course 8/14-8/27. CT enterography 8/30 negative for abscess with improvement in dilated bowel. ID and CRS signed off.  Blood cultures negative.   - Miralax daily prn with Low residue diet.   - Follow up with CRS outpatient.       Right internal jugular thrombus. Last noted per US 8/12/18, nonocclusive. Repeat US 9/5/18 notes nonocclusive thrombus to the lower right internal jugular.    - Eliquis remains on hold. Repeat US outpatient in 1 month.       Chronic/Resolved Medical Conditions:  Pseudomonas bacteremia secondary to necrotic oral ulcer.   Pulmonary Nodules. Stable RLL nodules measuring 5 mm and 3 mm per CT 8/12 and 8/29. Repeat Chest CT due 9/16.  DM Type II. Lantus decreased this AM with low BG. Medium sliding scale insulin.   CDIF. Completed of 10 day course of oral Vanco 9/6/18.  Acute on Chronic Anemia. Likely acute exacerbation in setting of colitis. Heparin gtt held since 8/22. Received 1 unit PRBC 8/29. Lisinopril held in setting of anemia exacerbation. Per CRS, no scope due to risk of disruption of anastomosis. Peripheral smear with no acute concerns 8/31. Hgb remains stable. Continue Epo per renal. Resume ASA in AM.   Non-Severe Protein Calorie Malnutrition. Albumin 1.9 9/3/18. Oral intake improved.       FEN: Low fiber diet   PROPHY:  Held in setting of bleed. Protonix.   DISPO:  Ok for transfer to TCU. TCU deferred discharge secondary to Leukopenia.   CODE STATUS: Full Code   ================================================================  Interval History/ROS: He is feeling well this AM. He notes mild tremor with low BG this AM, but tolerated breakfast well. He denies fever, chills, chest pain, palpitations, cough, nausea, vomiting, diarrhea, melena, hematochezia, and hematemesis. He is tolerating oral intake and ambulation well with progress with therapy.     Last 24 hr care team notes reviewed.   ROS:  4 point ROS including Respiratory, CV, GI and , other than that noted in the HPI, is negative.     Medications: Reviewed in EPIC.     Physical Exam:   BP (!) 142/98 (BP Location: Left arm)  Pulse 106  Temp  "98.6  F (37  C) (Oral)  Resp 16  Ht 1.753 m (5' 9\")  Wt 79.5 kg (175 lb 4.3 oz)  SpO2 100%  BMI 25.88 kg/m2  GENERAL: Appears alert and oriented times three.   HEENT: Eye symmetrical and free of discharge bilaterally. Mucous membranes moist and without lesions.  NECK: Supple and without lymphadenopathy. JVD below clavicular line upright.   CV: RRR, S1S2 present without murmur, rub, or gallop.   RESPIRATORY: Respirations regular, even, and unlabored. Faint crackles to left base, cleared with cough.   GI: Soft and non distended with normoactive bowel sounds present in all quadrants. No tenderness, rebound, guarding. No organomegaly. Colostomy covered.   EXTREMITIES: Trace bilateral peripheral edema. 2+ bilateral pedal pulses.   NEUROLOGIC: Alert and orientated x 3. CN II-XII grossly intact. No focal deficits.   MUSCULOSKELETAL: No joint swelling or tenderness.   SKIN: No jaundice. No rashes or lesions.     Data:  CMP  Recent Labs  Lab 09/08/18  0612 09/07/18  1819 09/07/18  0534 09/06/18  0705 09/05/18  0642  09/03/18  0616     --  136 140 138  < > 136   POTASSIUM 3.8  --  3.9 4.5 4.1  < > 4.0   CHLORIDE 98  --  99 106 106  < > 100   CO2 30  --  31 28 28  < > 28   ANIONGAP 7  --  6 6 5  < > 8   *  --  78 132* 114*  < > 100*   BUN 20  --  12 24 16  < > 22   CR 4.61*  --  3.49* 5.48* 4.17*  < > 4.67*   GFRESTIMATED 13*  --  18* 11* 14*  < > 13*   GFRESTBLACK 16*  --  22* 13* 18*  < > 15*   TRINI 8.0*  --  8.0* 8.0* 8.2*  < > 7.8*   MAG  --  2.6* 1.5*  --  1.7  --  1.8   PHOS  --   --  1.7*  --  2.2*  --  2.2*   PROTTOTAL  --   --   --   --   --   --  5.2*   ALBUMIN  --   --   --   --   --   --  1.9*   BILITOTAL  --   --   --   --   --   --  0.5   ALKPHOS  --   --   --   --   --   --  138   AST  --   --   --   --   --   --  17   ALT  --   --   --   --   --   --  14   < > = values in this interval not displayed.  CBC  Recent Labs  Lab 09/08/18  0612 09/07/18  0534 09/06/18  1826 09/06/18  0705 " 09/05/18  0642   WBC 1.0* 1.2*  --  1.2* 1.6*   RBC 2.65* 2.56*  --  2.58* 2.67*   HGB 8.3* 8.1* 8.1* 8.2* 8.5*   HCT 26.3* 25.7*  --  26.3* 27.2*   MCV 99 100  --  102* 102*   MCH 31.3 31.6  --  31.8 31.8   MCHC 31.6 31.5  --  31.2* 31.3*   RDW 21.5* 21.8*  --  22.0* 21.9*   * 126*  --  131* 120*     INR  Recent Labs  Lab 09/03/18  0616   INR 1.05       Patient discussed with Dr. Shearer.     Jennifer Dean [facility]  9/9/2018

## 2018-09-09 NOTE — PLAN OF CARE
Problem: Patient Care Overview  Goal: Plan of Care/Patient Progress Review  OT 6C: CX-Pt eating lunch, schedule did not allow for check back. Rescheduled according to POC.

## 2018-09-09 NOTE — PROGRESS NOTES
Nephrology:    Chart and labs reviewed. Patient dialyzed yesterday for 3.5 hours with 0.7 kg of fluid removed. Tolerated well. No acute need for dialysis today. Plan for next HD run on Tuesday, 9/11 per his usual TTS schedule.    VERONICA Cagle, CNP  Renal Division

## 2018-09-09 NOTE — PLAN OF CARE
Problem: Patient Care Overview  Goal: Plan of Care/Patient Progress Review  Outcome: No Change  Pt admit 8/12 with colonic + rectal abscess + colitis, s/p colostomy 8/14. Pt A/O. See flowsheets for VS. BPs elevated, Cards 2 NP aware. NSR/ST. RA. BG assessed and managed per protocol. BG low this AM (61). Cards 2 NP updated, Lantus dose decreased. BG recovered with breakfast. Sepsis protocol triggered this afternoon; sepsis lab not drawn per verbal from Cards 2 MD (also see pt care order). Colostomy intact. Worked with therapy. Continue with plan of care and report changes to treatment team.

## 2018-09-10 ENCOUNTER — APPOINTMENT (OUTPATIENT)
Dept: PHYSICAL THERAPY | Facility: CLINIC | Age: 63
DRG: 329 | End: 2018-09-10
Payer: MEDICARE

## 2018-09-10 LAB
ALBUMIN SERPL-MCNC: 2 G/DL (ref 3.4–5)
ALP SERPL-CCNC: 141 U/L (ref 40–150)
ALT SERPL W P-5'-P-CCNC: 14 U/L (ref 0–70)
ANION GAP SERPL CALCULATED.3IONS-SCNC: 8 MMOL/L (ref 3–14)
AST SERPL W P-5'-P-CCNC: 14 U/L (ref 0–45)
BASOPHILS # BLD AUTO: 0 10E9/L (ref 0–0.2)
BASOPHILS NFR BLD AUTO: 2.5 %
BILIRUB SERPL-MCNC: 0.5 MG/DL (ref 0.2–1.3)
BUN SERPL-MCNC: 20 MG/DL (ref 7–30)
CALCIUM SERPL-MCNC: 7.6 MG/DL (ref 8.5–10.1)
CHLORIDE SERPL-SCNC: 100 MMOL/L (ref 94–109)
CO2 SERPL-SCNC: 29 MMOL/L (ref 20–32)
CREAT SERPL-MCNC: 4.5 MG/DL (ref 0.66–1.25)
DIFFERENTIAL METHOD BLD: ABNORMAL
EOSINOPHIL # BLD AUTO: 0 10E9/L (ref 0–0.7)
EOSINOPHIL NFR BLD AUTO: 1.7 %
ERYTHROCYTE [DISTWIDTH] IN BLOOD BY AUTOMATED COUNT: 21.8 % (ref 10–15)
GFR SERPL CREATININE-BSD FRML MDRD: 13 ML/MIN/1.7M2
GLUCOSE BLDC GLUCOMTR-MCNC: 119 MG/DL (ref 70–99)
GLUCOSE BLDC GLUCOMTR-MCNC: 160 MG/DL (ref 70–99)
GLUCOSE BLDC GLUCOMTR-MCNC: 172 MG/DL (ref 70–99)
GLUCOSE SERPL-MCNC: 108 MG/DL (ref 70–99)
HCT VFR BLD AUTO: 25 % (ref 40–53)
HGB BLD-MCNC: 7.9 G/DL (ref 13.3–17.7)
IMM GRANULOCYTES # BLD: 0 10E9/L (ref 0–0.4)
IMM GRANULOCYTES NFR BLD: 0.8 %
INR PPP: 1.05 (ref 0.86–1.14)
LAB SCANNED RESULT: NORMAL
LYMPHOCYTES # BLD AUTO: 0.3 10E9/L (ref 0.8–5.3)
LYMPHOCYTES NFR BLD AUTO: 28.8 %
MAGNESIUM SERPL-MCNC: 1.8 MG/DL (ref 1.6–2.3)
MCH RBC QN AUTO: 31.9 PG (ref 26.5–33)
MCHC RBC AUTO-ENTMCNC: 31.6 G/DL (ref 31.5–36.5)
MCV RBC AUTO: 101 FL (ref 78–100)
MONOCYTES # BLD AUTO: 0.1 10E9/L (ref 0–1.3)
MONOCYTES NFR BLD AUTO: 9.3 %
NEUTROPHILS # BLD AUTO: 0.7 10E9/L (ref 1.6–8.3)
NEUTROPHILS NFR BLD AUTO: 56.9 %
NRBC # BLD AUTO: 0 10*3/UL
NRBC BLD AUTO-RTO: 0 /100
PHOSPHATE SERPL-MCNC: 2.4 MG/DL (ref 2.5–4.5)
PLATELET # BLD AUTO: 160 10E9/L (ref 150–450)
POTASSIUM SERPL-SCNC: 3.7 MMOL/L (ref 3.4–5.3)
PREALB SERPL IA-MCNC: 24 MG/DL (ref 15–45)
PROT SERPL-MCNC: 5 G/DL (ref 6.8–8.8)
RBC # BLD AUTO: 2.48 10E12/L (ref 4.4–5.9)
SODIUM SERPL-SCNC: 137 MMOL/L (ref 133–144)
TACROLIMUS BLD-MCNC: 5.8 UG/L (ref 5–15)
TME LAST DOSE: NORMAL H
TRIGL SERPL-MCNC: 71 MG/DL
WBC # BLD AUTO: 1.2 10E9/L (ref 4–11)

## 2018-09-10 PROCEDURE — 40000193 ZZH STATISTIC PT WARD VISIT

## 2018-09-10 PROCEDURE — 85025 COMPLETE CBC W/AUTO DIFF WBC: CPT | Performed by: COLON & RECTAL SURGERY

## 2018-09-10 PROCEDURE — A9270 NON-COVERED ITEM OR SERVICE: HCPCS | Mod: GY | Performed by: NURSE PRACTITIONER

## 2018-09-10 PROCEDURE — 25000132 ZZH RX MED GY IP 250 OP 250 PS 637: Mod: GY | Performed by: STUDENT IN AN ORGANIZED HEALTH CARE EDUCATION/TRAINING PROGRAM

## 2018-09-10 PROCEDURE — 36415 COLL VENOUS BLD VENIPUNCTURE: CPT | Performed by: COLON & RECTAL SURGERY

## 2018-09-10 PROCEDURE — 80053 COMPREHEN METABOLIC PANEL: CPT | Performed by: COLON & RECTAL SURGERY

## 2018-09-10 PROCEDURE — 80197 ASSAY OF TACROLIMUS: CPT | Performed by: NURSE PRACTITIONER

## 2018-09-10 PROCEDURE — 83735 ASSAY OF MAGNESIUM: CPT | Performed by: COLON & RECTAL SURGERY

## 2018-09-10 PROCEDURE — 21400006 ZZH R&B CCU INTERMEDIATE UMMC

## 2018-09-10 PROCEDURE — 97110 THERAPEUTIC EXERCISES: CPT | Mod: GP

## 2018-09-10 PROCEDURE — 25000132 ZZH RX MED GY IP 250 OP 250 PS 637: Mod: GY | Performed by: NURSE PRACTITIONER

## 2018-09-10 PROCEDURE — 84134 ASSAY OF PREALBUMIN: CPT | Performed by: COLON & RECTAL SURGERY

## 2018-09-10 PROCEDURE — 85610 PROTHROMBIN TIME: CPT | Performed by: COLON & RECTAL SURGERY

## 2018-09-10 PROCEDURE — 99232 SBSQ HOSP IP/OBS MODERATE 35: CPT | Performed by: NURSE PRACTITIONER

## 2018-09-10 PROCEDURE — 25000132 ZZH RX MED GY IP 250 OP 250 PS 637: Mod: GY | Performed by: INTERNAL MEDICINE

## 2018-09-10 PROCEDURE — 97530 THERAPEUTIC ACTIVITIES: CPT | Mod: GP

## 2018-09-10 PROCEDURE — 25000131 ZZH RX MED GY IP 250 OP 636 PS 637: Mod: GY | Performed by: NURSE PRACTITIONER

## 2018-09-10 PROCEDURE — A9270 NON-COVERED ITEM OR SERVICE: HCPCS | Mod: GY | Performed by: STUDENT IN AN ORGANIZED HEALTH CARE EDUCATION/TRAINING PROGRAM

## 2018-09-10 PROCEDURE — A9270 NON-COVERED ITEM OR SERVICE: HCPCS | Mod: GY | Performed by: INTERNAL MEDICINE

## 2018-09-10 PROCEDURE — 84478 ASSAY OF TRIGLYCERIDES: CPT | Performed by: COLON & RECTAL SURGERY

## 2018-09-10 PROCEDURE — 84100 ASSAY OF PHOSPHORUS: CPT | Performed by: COLON & RECTAL SURGERY

## 2018-09-10 PROCEDURE — 00000146 ZZHCL STATISTIC GLUCOSE BY METER IP

## 2018-09-10 PROCEDURE — 25000132 ZZH RX MED GY IP 250 OP 250 PS 637: Mod: GY | Performed by: COLON & RECTAL SURGERY

## 2018-09-10 PROCEDURE — 25000125 ZZHC RX 250: Performed by: NURSE PRACTITIONER

## 2018-09-10 RX ORDER — AMLODIPINE BESYLATE 10 MG/1
10 TABLET ORAL DAILY
Status: DISCONTINUED | OUTPATIENT
Start: 2018-09-10 | End: 2018-09-11 | Stop reason: HOSPADM

## 2018-09-10 RX ORDER — HYDRALAZINE HYDROCHLORIDE 100 MG/1
100 TABLET, FILM COATED ORAL EVERY 8 HOURS SCHEDULED
Status: DISCONTINUED | OUTPATIENT
Start: 2018-09-10 | End: 2018-09-11 | Stop reason: HOSPADM

## 2018-09-10 RX ADMIN — ATORVASTATIN CALCIUM 20 MG: 20 TABLET, FILM COATED ORAL at 20:16

## 2018-09-10 RX ADMIN — Medication 1 CAPSULE: at 08:31

## 2018-09-10 RX ADMIN — Medication 50 MG: at 22:12

## 2018-09-10 RX ADMIN — AMLODIPINE BESYLATE 10 MG: 10 TABLET ORAL at 08:46

## 2018-09-10 RX ADMIN — NYSTATIN 1000000 UNITS: 100000 SUSPENSION ORAL at 17:10

## 2018-09-10 RX ADMIN — ASPIRIN 81 MG: 81 TABLET, COATED ORAL at 08:31

## 2018-09-10 RX ADMIN — HYDRALAZINE HYDROCHLORIDE 75 MG: 50 TABLET ORAL at 05:35

## 2018-09-10 RX ADMIN — NYSTATIN 1000000 UNITS: 100000 SUSPENSION ORAL at 08:49

## 2018-09-10 RX ADMIN — NYSTATIN 1000000 UNITS: 100000 SUSPENSION ORAL at 22:12

## 2018-09-10 RX ADMIN — Medication 2 G: at 12:50

## 2018-09-10 RX ADMIN — TACROLIMUS 2 MG: 1 CAPSULE ORAL at 08:31

## 2018-09-10 RX ADMIN — HYDRALAZINE HYDROCHLORIDE 100 MG: 100 TABLET ORAL at 20:16

## 2018-09-10 RX ADMIN — HYDRALAZINE HYDROCHLORIDE 75 MG: 50 TABLET ORAL at 13:39

## 2018-09-10 RX ADMIN — NYSTATIN 1000000 UNITS: 100000 SUSPENSION ORAL at 13:00

## 2018-09-10 RX ADMIN — PANTOPRAZOLE SODIUM 40 MG: 40 TABLET, DELAYED RELEASE ORAL at 08:31

## 2018-09-10 RX ADMIN — TACROLIMUS 2 MG: 1 CAPSULE ORAL at 18:18

## 2018-09-10 RX ADMIN — PREDNISONE 5 MG: 5 TABLET ORAL at 08:31

## 2018-09-10 RX ADMIN — PREDNISONE 5 MG: 5 TABLET ORAL at 18:18

## 2018-09-10 ASSESSMENT — PAIN DESCRIPTION - DESCRIPTORS
DESCRIPTORS: ACHING;DULL
DESCRIPTORS: ACHING;DULL

## 2018-09-10 ASSESSMENT — ACTIVITIES OF DAILY LIVING (ADL)
ADLS_ACUITY_SCORE: 12

## 2018-09-10 NOTE — PLAN OF CARE
Problem: Patient Care Overview  Goal: Plan of Care/Patient Progress Review  Outcome: Improving  D: Pt admitted w/ colonic abscess, rectal abscess, colitis s/p lap sigmoid colectomy w/ end colostomy 8/14 and drain to rectal abscess 8/12.  Hx heart tx 6/14/18.  ESRD, HD TTS.  I: Low fiber diet.  PRN tramadol.    A: Pt endorses abdominal discomfort, controlled w/ tramadol at bedtime.  VSS, see flowsheet.  SR.  Voiding minimally in urinal.  Sleeping b/w cares.  Independent.  ANC 0.6.  Pt declining VS/BG overnight.    P: Continue to monitor and notify MDs as necessary of pertinent pt condition changes.  TCU vs home soon.

## 2018-09-10 NOTE — PROGRESS NOTES
[facility]   Cardiology II Service / Advanced Heart Failure  Daily Progress Note  Date of Service: 9/10/2018      Patient: Murray Nicholson  MRN: [id number]  Admission Date: 8/12/2018  Hospital Day # 29    Assessment and Plan: Murray Nicholson is a 63 year old male with a past medical history of NICM s/p OHT on 6/14/18, RIJ thrombus, ESRD on hemodialysis, and DM II who presented this admission with hematochezia secondary to colonic and rectal abscess, and colitis.     Today's Plan:  1.  Increase Amlodipine to 10 mg daily  2.  Increase Hydralazine to 100 mg three times daily.  3.  Echocardiogram in the am  4.  Venous ultrasound in the am to reassess RIJ clot.     Leukopenia:  No acute concerns seen on peripheral smear done on 8/31.  Valcyte discontinued on 9/5/18.  Cellcept discontinued on 9/8/18.  Urine histoplasmosis urine antigen was negative. BC NGTD.  -WBC today:  Improving 1.2.  Was 0.97 yesterday.  ANC 0.7.    -Continue Neutropenic precautions.    -Immunknow 95 on 9/6 with an associated WBC count of 1.2 and 0.8 ANC.   -Repeat Immunknow level once WBC/ANC count normalizes.   -Continue with Tacrolimus and Prednisone at current dose for immunosuppression.     History of: NICM  S/p OHT on 6/14/18  Serostatus: HSV1-, HSV2+, CMV D+/R-, EBV D?/R+, VZV+    Immunosuppression:  Cellcept:   Discontinued on 9/8 due to leukopenia.  Tacrolimus:  2 mg twice daily. Goal 6-8.  Tacro level  5.8  today.  Continue same dose. Repeat Tacro level in one week.   Prednisone:  5 mg twice daily.  Continue current dose, and don't decrease per protocol per Dr. Shearer as he is only on Tacro and Prednisone for immunosuppression secondary to low WBC count.     Prophylaxis:  CAV:  ASA 81 mg daily.  Statin:  Atorvastatin 20 mg daily   Thrush:  Nystatin 1,000,000 units 4 times daily.   GI:  Protonix 40 mg daily.   Serostatus: HSV1-, HSV2+, CMV D+/R-, EBV D?/R+, VZV+.   Valcyte:  Discontinued on 9/5/18.  Osteoporosis prevention:  Calcium/Vitamin D:  Not currently taking supplement.  Will check with renal in the am re recommendations with GFR <30 in the am as he is scheduled for dialysis.    Studies:  ECHO:  7/20/18:    Interpretation Summary  Global and regional left ventricular function is hyperkinetic with an EF >70%.  Mild-moderate left ventricular hypertrophy.  Right ventricle with normal size and systolic function with mild hypertrophy.  No significant valve disease and no change in LVEF.    RHC:   BSA 2.0  1. HR 96 bpm  2. /81 mmHg  3. RA 5/8/4   4. RV 25/5  5. PA 30/15/20   6. PCW 10   7. PA sat 73.6%   8. PCW sat not obtained  9. Hgb 8.2 g/dL   10. Estimated Kassi CO 10.0   11. Estimated Kassi CI 5.06   12. TD CO 7.67   13. TD CI 3.88   14. PVR 1.0      BIOPSIES/HISTORY OF GRAFT REJECTION:   Negative biopsy 9/5/18   Fluid Status:  Hypervolemic.  Dialysis today.     HTN:  Previously on Lisinopril which has been held since 8/6/18 due to concern with acute anemia.  MAPS:  Elevated at 110-114.   - Increase Amlodipine to 10 mg daily and Hydralazine to 100 mg three times daily.  Hold for systolic BP of <110.    ESRD on Hemodialysis:  Remains inactive on renal transplant list.   -Nephrology following  -Continue Hemodialysis every T, Th, Sat    Pericolonic abscess, rectal abscess and colitis: S/p laparoscopic sigmoid colectomy with end colostomy 8/14 and drain to rectal abscess 8/12. Biopsy positive for VRE. Completed Cipro and Flagyl course on 8/14-8/27. CT enterography performed on 8/30 was negative for abscess with improvement in dilated bowel. ID and colorectal surgery signed off. Blood cultures negative to date.   - Continue Miralax daily as needed for constipation.  - Continue with low residue diet.     - Follow up with colorectal surgery as an outpatient.     RIJ thrombus:  Diagnosed on US on 8/12/18.  Nonocclusive.  Repeat ultrasound on 9/5/18:  Nonocclusive thrombus to the lower right internal jugular.  -Eliquis remains on hold  "secondary to recent bleed   -Repeat ultrasound as an outpatient in one month      Chronic/Resolved Medical Conditions:  Pseudomonas bacteremia secondary to necrotic oral ulcer.   Pulmonary Nodules. Stable RLL nodules measuring 5 mm and 3 mm per CT 8/12 and 8/29. Repeat Chest CT due 9/16.  DM Type II. Lantus decreased this AM with low BG. Medium sliding scale insulin.   CDIFF. Completed of 10 day course of oral Vanco 9/6/18.  Acute on Chronic Anemia. Likely acute exacerbation in setting of colitis. Heparin gtt held since 8/22. Received 1 unit PRBC 8/29. Lisinopril held in setting of anemia exacerbation. Per CRS, no scope due to risk of disruption of anastomosis. Peripheral smear with no acute concerns 8/31. Hgb remains stable. Continue Epo per renal. Resume ASA as previously recommended.   Non-Severe Protein Calorie Malnutrition. Albumin 1.9 9/3/18. Oral intake improving.       FEN: Low fiber diet   PROPHY:  Held in setting of bleed. Protonix.   DISPO:  Ok for transfer to TCU when bed ready as WBC count is improving.   CODE STATUS:  Full   ================================================================    Interval History/ROS: Doing well overall.  Complains of dull abdominal pain.  Walked up 13 steps with some shortness of breath. Denies SOB at rest, MEADE, PND, orthopnea, chest pain, palpitations, lightheadedness, dizziness, near syncopal/syncopal episodes.  Tolerating food and liquids well. Would like to advance diet.       Last 24 hr care team notes reviewed.   ROS:  4 point ROS including Respiratory, CV, GI and , other than that noted in the HPI, is negative.     Medications: Reviewed in EPIC.     Physical Exam:   /83  Pulse 97  Temp 98.3  F (36.8  C) (Oral)  Resp 16  Ht 1.753 m (5' 9\")  Wt 79.9 kg (176 lb 2.4 oz)  SpO2 100%  BMI 26.01 kg/m2  GENERAL: Alert and oriented times three. Comfortable.  No acute distress.  HEENT: EOM's conjugate. Mucous membranes moist and without lesions.  NECK: Supple " and without lymphadenopathy. JVD mid neck.    CV: Rhythm tachycardic. S1S2 present without murmur, rub, or gallop.   RESPIRATORY: Respirations regular, even, and unlabored. No accessory muscle use. Lungs CTA throughout.   GI: Soft and non distended with normoactive bowel sounds present in all quadrants. No tenderness, rebound, guarding. No hepatomegaly.   EXTREMITIES: Extremites warm to the touch.  1+ peripheral edema. 2+ bilateral pedal pulses. No clubbing.  NEUROLOGIC: Alert and orientated x 3. CN II-XII grossly intact. No focal deficits.   MUSCULOSKELETAL: No joint swelling or tenderness. No acute deformities.  SKIN: No cyanosis. No rashes or lesions. Ecchymotic arms bilaterally.  Skin around ostomy WNL.     Data:  CMP  Recent Labs  Lab 09/10/18  0640 09/09/18  0636 09/08/18  0612 09/07/18  1819 09/07/18  0534  09/05/18  0642    135 134  --  136  < > 138   POTASSIUM 3.7 3.8 3.8  --  3.9  < > 4.1   CHLORIDE 100 98 98  --  99  < > 106   CO2 29 29 30  --  31  < > 28   ANIONGAP 8 8 7  --  6  < > 5   * 78 110*  --  78  < > 114*   BUN 20 12 20  --  12  < > 16   CR 4.50* 3.35* 4.61*  --  3.49*  < > 4.17*   GFRESTIMATED 13* 19* 13*  --  18*  < > 14*   GFRESTBLACK 16* 23* 16*  --  22*  < > 18*   TRINI 7.6* 7.9* 8.0*  --  8.0*  < > 8.2*   MAG 1.8  --   --  2.6* 1.5*  --  1.7   PHOS 2.4*  --   --   --  1.7*  --  2.2*   PROTTOTAL 5.0*  --   --   --   --   --   --    ALBUMIN 2.0*  --   --   --   --   --   --    BILITOTAL 0.5  --   --   --   --   --   --    ALKPHOS 141  --   --   --   --   --   --    AST 14  --   --   --   --   --   --    ALT 14  --   --   --   --   --   --    < > = values in this interval not displayed.  CBC    Recent Labs  Lab 09/10/18  0640 09/09/18  0636 09/08/18  0612 09/07/18  0534   WBC 1.2* 1.0* 1.0* 1.2*   RBC 2.48* 2.61* 2.65* 2.56*   HGB 7.9* 8.3* 8.3* 8.1*   HCT 25.0* 26.8* 26.3* 25.7*   * 103* 99 100   MCH 31.9 31.8 31.3 31.6   MCHC 31.6 31.0* 31.6 31.5   RDW 21.8* 21.9* 21.5*  21.8*    167 139* 126*     INR    Recent Labs  Lab 09/10/18  0640   INR 1.05       Patient seen and discussed with Dr. Shearer.      Kristi MARTIN, CNP  Cards II  Pager [phone]    9/10/2018

## 2018-09-10 NOTE — PLAN OF CARE
Problem: Patient Care Overview  Goal: Plan of Care/Patient Progress Review  Discharge Planner PT   Patient plan for discharge: TCU  Current status: patient progressing well with therapies. Completing bed mobility from flat bed mod I with use of bed rail. Indep with sit to stand transfers. Ambulates independently ~150 feet + 400 feet, standing breaks occasionally d/t fatigue. Patient able to complete 18 stairs this day with 2 railings, does need increased time to complete them with ~30 second to 1 minute break between each 3 set of stairs. Clarified that pt has about 26 stairs at home to perform - 5 + 5 + 8 + 8 with landings between each bout.    Barriers to return to prior living situation: Continues with weakness in BLE and general decreased tolerance to activity impacting safety for indep dc home; stairs  Recommendations for discharge: TCU  Rationale for recommendations: patient would benefit from continued skilled PT to progress strength, activity tolerance for increased indep with functional mobility       Entered by: Scarlet Tavarez 09/10/2018 9:38 AM

## 2018-09-10 NOTE — PLAN OF CARE
Problem: Patient Care Overview  Goal: Plan of Care/Patient Progress Review  OT: Cancel, Pt stating that he was having a meeting shortly and was unable to participate in therapy session.  Pt stating that he would walk on his own at a later time.

## 2018-09-10 NOTE — PLAN OF CARE
Problem: Patient Care Overview  Goal: Plan of Care/Patient Progress Review  Outcome: No Change  Pt admit 8/12 with colonic + rectal abscess + colitis, s/p colostomy 8/14. Pt A/O. See flowsheets for VS, BPs intermittently elevated. NSR/ST. RA. BG assessed and managed per protocol. Colostomy intact. Oliguric. Mg (1.8) replaced per protocol. Worked with therapy. Continues with neutropenic precautions. Sepsis protocol triggered this AM; sepsis labs not drawn per Cards 2 NP. Also see pt care order. Continue with plan of care and report changes to treatment team. Plan for HD tomorrow (9/11). Plan for US (reassess RIJ thrombus) + Echo tomorrow, per orders.

## 2018-09-11 ENCOUNTER — APPOINTMENT (OUTPATIENT)
Dept: OCCUPATIONAL THERAPY | Facility: CLINIC | Age: 63
DRG: 329 | End: 2018-09-11
Payer: MEDICARE

## 2018-09-11 ENCOUNTER — APPOINTMENT (OUTPATIENT)
Dept: ULTRASOUND IMAGING | Facility: CLINIC | Age: 63
DRG: 329 | End: 2018-09-11
Attending: NURSE PRACTITIONER
Payer: MEDICARE

## 2018-09-11 ENCOUNTER — HOSPITAL ENCOUNTER (INPATIENT)
Facility: SKILLED NURSING FACILITY | Age: 63
LOS: 15 days | Discharge: HOME | DRG: 949 | End: 2018-09-26
Attending: HOSPITALIST | Admitting: INTERNAL MEDICINE
Payer: MEDICARE

## 2018-09-11 ENCOUNTER — APPOINTMENT (OUTPATIENT)
Dept: CARDIOLOGY | Facility: CLINIC | Age: 63
DRG: 329 | End: 2018-09-11
Attending: NURSE PRACTITIONER
Payer: MEDICARE

## 2018-09-11 VITALS
WEIGHT: 176.4 LBS | OXYGEN SATURATION: 100 % | SYSTOLIC BLOOD PRESSURE: 152 MMHG | DIASTOLIC BLOOD PRESSURE: 93 MMHG | HEIGHT: 69 IN | BODY MASS INDEX: 26.13 KG/M2 | HEART RATE: 95 BPM | TEMPERATURE: 98.4 F | RESPIRATION RATE: 16 BRPM

## 2018-09-11 DIAGNOSIS — Z94.1 HEART REPLACED BY TRANSPLANT (H): ICD-10-CM

## 2018-09-11 DIAGNOSIS — Z79.4 TYPE 2 DIABETES MELLITUS WITH DIABETIC NEPHROPATHY, WITH LONG-TERM CURRENT USE OF INSULIN (H): ICD-10-CM

## 2018-09-11 DIAGNOSIS — I10 HYPERTENSION GOAL BP (BLOOD PRESSURE) < 130/80: ICD-10-CM

## 2018-09-11 DIAGNOSIS — E11.21 TYPE 2 DIABETES MELLITUS WITH DIABETIC NEPHROPATHY, WITH LONG-TERM CURRENT USE OF INSULIN (H): ICD-10-CM

## 2018-09-11 DIAGNOSIS — K12.1 ORAL ULCERATION: ICD-10-CM

## 2018-09-11 DIAGNOSIS — Z94.1 HEART TRANSPLANTED (H): ICD-10-CM

## 2018-09-11 DIAGNOSIS — E11.22 TYPE 2 DIABETES MELLITUS WITH DIABETIC CHRONIC KIDNEY DISEASE (H): Primary | ICD-10-CM

## 2018-09-11 DIAGNOSIS — K57.32 SIGMOID DIVERTICULITIS: ICD-10-CM

## 2018-09-11 DIAGNOSIS — K29.80 DUODENITIS: ICD-10-CM

## 2018-09-11 DIAGNOSIS — Z94.1 HEART TRANSPLANT RECIPIENT (H): ICD-10-CM

## 2018-09-11 DIAGNOSIS — B37.0 CANDIDIASIS OF MOUTH: ICD-10-CM

## 2018-09-11 LAB
BASOPHILS # BLD AUTO: 0 10E9/L (ref 0–0.2)
BASOPHILS NFR BLD AUTO: 0 %
DIFFERENTIAL METHOD BLD: ABNORMAL
EOSINOPHIL # BLD AUTO: 0 10E9/L (ref 0–0.7)
EOSINOPHIL NFR BLD AUTO: 2.5 %
ERYTHROCYTE [DISTWIDTH] IN BLOOD BY AUTOMATED COUNT: 22.1 % (ref 10–15)
GLUCOSE BLDC GLUCOMTR-MCNC: 135 MG/DL (ref 70–99)
GLUCOSE BLDC GLUCOMTR-MCNC: 153 MG/DL (ref 70–99)
GLUCOSE BLDC GLUCOMTR-MCNC: 156 MG/DL (ref 70–99)
GLUCOSE BLDC GLUCOMTR-MCNC: 70 MG/DL (ref 70–99)
HCT VFR BLD AUTO: 26.5 % (ref 40–53)
HGB BLD-MCNC: 8.2 G/DL (ref 13.3–17.7)
IMM GRANULOCYTES # BLD: 0 10E9/L (ref 0–0.4)
IMM GRANULOCYTES NFR BLD: 0 %
LYMPHOCYTES # BLD AUTO: 0.2 10E9/L (ref 0.8–5.3)
LYMPHOCYTES NFR BLD AUTO: 19.5 %
MCH RBC QN AUTO: 31.9 PG (ref 26.5–33)
MCHC RBC AUTO-ENTMCNC: 30.9 G/DL (ref 31.5–36.5)
MCV RBC AUTO: 103 FL (ref 78–100)
MONOCYTES # BLD AUTO: 0.1 10E9/L (ref 0–1.3)
MONOCYTES NFR BLD AUTO: 5.9 %
NEUTROPHILS # BLD AUTO: 0.9 10E9/L (ref 1.6–8.3)
NEUTROPHILS NFR BLD AUTO: 72.1 %
NRBC # BLD AUTO: 0 10*3/UL
NRBC BLD AUTO-RTO: 0 /100
PLATELET # BLD AUTO: 179 10E9/L (ref 150–450)
RBC # BLD AUTO: 2.57 10E12/L (ref 4.4–5.9)
WBC # BLD AUTO: 1.2 10E9/L (ref 4–11)

## 2018-09-11 PROCEDURE — 63400005 ZZH RX 634: Performed by: INTERNAL MEDICINE

## 2018-09-11 PROCEDURE — 25000132 ZZH RX MED GY IP 250 OP 250 PS 637: Mod: GY | Performed by: NURSE PRACTITIONER

## 2018-09-11 PROCEDURE — A9270 NON-COVERED ITEM OR SERVICE: HCPCS | Mod: GY | Performed by: INTERNAL MEDICINE

## 2018-09-11 PROCEDURE — 12000022 ZZH R&B SNF

## 2018-09-11 PROCEDURE — 99239 HOSP IP/OBS DSCHRG MGMT >30: CPT | Mod: 25 | Performed by: NURSE PRACTITIONER

## 2018-09-11 PROCEDURE — 97530 THERAPEUTIC ACTIVITIES: CPT | Mod: GO

## 2018-09-11 PROCEDURE — 93306 TTE W/DOPPLER COMPLETE: CPT | Mod: 26 | Performed by: INTERNAL MEDICINE

## 2018-09-11 PROCEDURE — A9270 NON-COVERED ITEM OR SERVICE: HCPCS | Mod: GY | Performed by: NURSE PRACTITIONER

## 2018-09-11 PROCEDURE — 25000125 ZZHC RX 250: Performed by: INTERNAL MEDICINE

## 2018-09-11 PROCEDURE — 85025 COMPLETE CBC W/AUTO DIFF WBC: CPT | Performed by: NURSE PRACTITIONER

## 2018-09-11 PROCEDURE — 97110 THERAPEUTIC EXERCISES: CPT | Mod: GO

## 2018-09-11 PROCEDURE — 25000132 ZZH RX MED GY IP 250 OP 250 PS 637: Mod: GY | Performed by: STUDENT IN AN ORGANIZED HEALTH CARE EDUCATION/TRAINING PROGRAM

## 2018-09-11 PROCEDURE — 25000132 ZZH RX MED GY IP 250 OP 250 PS 637: Mod: GY | Performed by: INTERNAL MEDICINE

## 2018-09-11 PROCEDURE — 00000146 ZZHCL STATISTIC GLUCOSE BY METER IP

## 2018-09-11 PROCEDURE — 90937 HEMODIALYSIS REPEATED EVAL: CPT

## 2018-09-11 PROCEDURE — 93971 EXTREMITY STUDY: CPT | Mod: RT

## 2018-09-11 PROCEDURE — 25000125 ZZHC RX 250: Performed by: NURSE PRACTITIONER

## 2018-09-11 PROCEDURE — 25000131 ZZH RX MED GY IP 250 OP 636 PS 637: Mod: GY | Performed by: INTERNAL MEDICINE

## 2018-09-11 PROCEDURE — 40000133 ZZH STATISTIC OT WARD VISIT

## 2018-09-11 PROCEDURE — A9270 NON-COVERED ITEM OR SERVICE: HCPCS | Mod: GY | Performed by: STUDENT IN AN ORGANIZED HEALTH CARE EDUCATION/TRAINING PROGRAM

## 2018-09-11 PROCEDURE — 97535 SELF CARE MNGMENT TRAINING: CPT | Mod: GO

## 2018-09-11 PROCEDURE — 25000128 H RX IP 250 OP 636: Performed by: INTERNAL MEDICINE

## 2018-09-11 PROCEDURE — 25000131 ZZH RX MED GY IP 250 OP 636 PS 637: Mod: GY | Performed by: NURSE PRACTITIONER

## 2018-09-11 PROCEDURE — 93306 TTE W/DOPPLER COMPLETE: CPT

## 2018-09-11 RX ORDER — SODIUM PHOSPHATE,MONOBASIC
4 GRANULES (GRAM) MISCELLANEOUS ONCE
Status: COMPLETED | OUTPATIENT
Start: 2018-09-11 | End: 2018-09-11

## 2018-09-11 RX ORDER — ASPIRIN 81 MG/1
81 TABLET ORAL DAILY
Status: DISCONTINUED | OUTPATIENT
Start: 2018-09-12 | End: 2018-09-26 | Stop reason: HOSPADM

## 2018-09-11 RX ORDER — PENTAMIDINE ISETHIONATE 300 MG/300MG
300 INHALANT RESPIRATORY (INHALATION) ONCE
Status: DISCONTINUED | OUTPATIENT
Start: 2018-09-14 | End: 2018-09-11 | Stop reason: HOSPADM

## 2018-09-11 RX ORDER — NICOTINE POLACRILEX 4 MG
15-30 LOZENGE BUCCAL
COMMUNITY
Start: 2018-09-11 | End: 2018-10-05

## 2018-09-11 RX ORDER — ACETAMINOPHEN 325 MG/1
650 TABLET ORAL EVERY 4 HOURS PRN
Status: DISCONTINUED | OUTPATIENT
Start: 2018-09-11 | End: 2018-09-26 | Stop reason: HOSPADM

## 2018-09-11 RX ORDER — SIMETHICONE 80 MG
80 TABLET,CHEWABLE ORAL EVERY 6 HOURS PRN
Qty: 180 TABLET | COMMUNITY
Start: 2018-09-11 | End: 2019-03-13

## 2018-09-11 RX ORDER — PANTOPRAZOLE SODIUM 40 MG/1
40 TABLET, DELAYED RELEASE ORAL EVERY MORNING
Status: DISCONTINUED | OUTPATIENT
Start: 2018-09-12 | End: 2018-09-19

## 2018-09-11 RX ORDER — ACETAMINOPHEN 650 MG/1
650 SUPPOSITORY RECTAL EVERY 4 HOURS PRN
Status: DISCONTINUED | OUTPATIENT
Start: 2018-09-11 | End: 2018-09-26 | Stop reason: HOSPADM

## 2018-09-11 RX ORDER — ALUMINA, MAGNESIA, AND SIMETHICONE 2400; 2400; 240 MG/30ML; MG/30ML; MG/30ML
15 SUSPENSION ORAL EVERY 4 HOURS PRN
Status: DISCONTINUED | OUTPATIENT
Start: 2018-09-11 | End: 2018-09-26 | Stop reason: HOSPADM

## 2018-09-11 RX ORDER — NALOXONE HYDROCHLORIDE 0.4 MG/ML
.1-.4 INJECTION, SOLUTION INTRAMUSCULAR; INTRAVENOUS; SUBCUTANEOUS
Status: DISCONTINUED | OUTPATIENT
Start: 2018-09-11 | End: 2018-09-26 | Stop reason: HOSPADM

## 2018-09-11 RX ORDER — NICOTINE POLACRILEX 4 MG
15-30 LOZENGE BUCCAL
Status: DISCONTINUED | OUTPATIENT
Start: 2018-09-11 | End: 2018-09-26 | Stop reason: HOSPADM

## 2018-09-11 RX ORDER — ONDANSETRON 4 MG/1
4 TABLET, ORALLY DISINTEGRATING ORAL EVERY 6 HOURS PRN
Status: DISCONTINUED | OUTPATIENT
Start: 2018-09-11 | End: 2018-09-26 | Stop reason: HOSPADM

## 2018-09-11 RX ORDER — TRAMADOL HYDROCHLORIDE 50 MG/1
50 TABLET ORAL EVERY 12 HOURS PRN
Qty: 10 TABLET | Refills: 0 | Status: ON HOLD | OUTPATIENT
Start: 2018-09-11 | End: 2018-12-17

## 2018-09-11 RX ORDER — HYDRALAZINE HYDROCHLORIDE 50 MG/1
100 TABLET, FILM COATED ORAL EVERY 8 HOURS SCHEDULED
Status: DISCONTINUED | OUTPATIENT
Start: 2018-09-11 | End: 2018-09-26 | Stop reason: HOSPADM

## 2018-09-11 RX ORDER — PREDNISONE 5 MG/1
5 TABLET ORAL 2 TIMES DAILY WITH MEALS
Status: DISCONTINUED | OUTPATIENT
Start: 2018-09-11 | End: 2018-09-26 | Stop reason: HOSPADM

## 2018-09-11 RX ORDER — LISINOPRIL 2.5 MG/1
2.5 TABLET ORAL DAILY
Status: ON HOLD | COMMUNITY
Start: 2018-09-11 | End: 2018-09-26

## 2018-09-11 RX ORDER — ACETAMINOPHEN 325 MG/1
650 TABLET ORAL EVERY 4 HOURS PRN
Status: DISCONTINUED | OUTPATIENT
Start: 2018-09-11 | End: 2018-09-11

## 2018-09-11 RX ORDER — NYSTATIN 100000/ML
1000000 SUSPENSION, ORAL (FINAL DOSE FORM) ORAL 4 TIMES DAILY
Status: DISCONTINUED | OUTPATIENT
Start: 2018-09-11 | End: 2018-09-21

## 2018-09-11 RX ORDER — TACROLIMUS 1 MG/1
2 CAPSULE ORAL 2 TIMES DAILY
Status: ON HOLD | COMMUNITY
Start: 2018-09-11 | End: 2018-09-26

## 2018-09-11 RX ORDER — PENTAMIDINE ISETHIONATE 300 MG/300MG
300 INHALANT RESPIRATORY (INHALATION) ONCE
Qty: 300 MG | Refills: 0 | Status: ON HOLD | COMMUNITY
Start: 2018-09-14 | End: 2018-09-26

## 2018-09-11 RX ORDER — ONDANSETRON 2 MG/ML
4 INJECTION INTRAMUSCULAR; INTRAVENOUS EVERY 6 HOURS PRN
Status: DISCONTINUED | OUTPATIENT
Start: 2018-09-11 | End: 2018-09-26 | Stop reason: HOSPADM

## 2018-09-11 RX ORDER — HYDRALAZINE HYDROCHLORIDE 100 MG/1
100 TABLET, FILM COATED ORAL EVERY 8 HOURS
Qty: 60 TABLET | COMMUNITY
Start: 2018-09-11 | End: 2018-10-22

## 2018-09-11 RX ORDER — TACROLIMUS 1 MG/1
2 CAPSULE ORAL 2 TIMES DAILY
Status: DISCONTINUED | OUTPATIENT
Start: 2018-09-12 | End: 2018-09-25

## 2018-09-11 RX ORDER — POLYETHYLENE GLYCOL 3350 17 G/17G
17 POWDER, FOR SOLUTION ORAL DAILY PRN
Qty: 7 PACKET | Status: ON HOLD | COMMUNITY
Start: 2018-09-11 | End: 2018-12-17

## 2018-09-11 RX ORDER — ATORVASTATIN CALCIUM 20 MG/1
20 TABLET, FILM COATED ORAL EVERY EVENING
Status: DISCONTINUED | OUTPATIENT
Start: 2018-09-11 | End: 2018-09-26 | Stop reason: HOSPADM

## 2018-09-11 RX ORDER — AMLODIPINE BESYLATE 5 MG/1
10 TABLET ORAL DAILY
Status: DISCONTINUED | OUTPATIENT
Start: 2018-09-12 | End: 2018-09-26 | Stop reason: HOSPADM

## 2018-09-11 RX ORDER — DOXERCALCIFEROL 4 UG/2ML
2 INJECTION INTRAVENOUS
Status: COMPLETED | OUTPATIENT
Start: 2018-09-11 | End: 2018-09-11

## 2018-09-11 RX ORDER — ALBUTEROL SULFATE 90 UG/1
2 AEROSOL, METERED RESPIRATORY (INHALATION) EVERY 4 HOURS PRN
Status: DISCONTINUED | OUTPATIENT
Start: 2018-09-11 | End: 2018-09-26 | Stop reason: HOSPADM

## 2018-09-11 RX ORDER — AMLODIPINE BESYLATE 10 MG/1
10 TABLET ORAL DAILY
Qty: 30 TABLET | Status: ON HOLD | COMMUNITY
Start: 2018-09-11 | End: 2018-09-26

## 2018-09-11 RX ORDER — DEXTROSE MONOHYDRATE 25 G/50ML
25-50 INJECTION, SOLUTION INTRAVENOUS
Status: DISCONTINUED | OUTPATIENT
Start: 2018-09-11 | End: 2018-09-26 | Stop reason: HOSPADM

## 2018-09-11 RX ORDER — SENNOSIDES 8.6 MG
1-2 TABLET ORAL 2 TIMES DAILY PRN
Status: DISCONTINUED | OUTPATIENT
Start: 2018-09-11 | End: 2018-09-26 | Stop reason: HOSPADM

## 2018-09-11 RX ORDER — PREDNISONE 5 MG/1
5 TABLET ORAL 2 TIMES DAILY WITH MEALS
Status: ON HOLD | COMMUNITY
Start: 2018-09-11 | End: 2018-09-26

## 2018-09-11 RX ADMIN — HEPARIN SODIUM 2.1 UNITS: 1000 INJECTION, SOLUTION INTRAVENOUS; SUBCUTANEOUS at 16:38

## 2018-09-11 RX ADMIN — PREDNISONE 5 MG: 5 TABLET ORAL at 17:16

## 2018-09-11 RX ADMIN — NYSTATIN 1000000 UNITS: 100000 SUSPENSION ORAL at 21:10

## 2018-09-11 RX ADMIN — DOXERCALCIFEROL 2 MCG: 4 INJECTION, SOLUTION INTRAVENOUS at 14:13

## 2018-09-11 RX ADMIN — Medication 1 CAPSULE: at 08:25

## 2018-09-11 RX ADMIN — PREDNISONE 5 MG: 5 TABLET ORAL at 19:42

## 2018-09-11 RX ADMIN — HYDRALAZINE HYDROCHLORIDE 100 MG: 50 TABLET ORAL at 21:10

## 2018-09-11 RX ADMIN — TACROLIMUS 2 MG: 1 CAPSULE ORAL at 17:16

## 2018-09-11 RX ADMIN — PREDNISONE 5 MG: 5 TABLET ORAL at 08:25

## 2018-09-11 RX ADMIN — ATORVASTATIN CALCIUM 20 MG: 20 TABLET, FILM COATED ORAL at 21:10

## 2018-09-11 RX ADMIN — HYDRALAZINE HYDROCHLORIDE 100 MG: 100 TABLET ORAL at 17:17

## 2018-09-11 RX ADMIN — NYSTATIN 1000000 UNITS: 100000 SUSPENSION ORAL at 08:24

## 2018-09-11 RX ADMIN — AMLODIPINE BESYLATE 10 MG: 10 TABLET ORAL at 17:17

## 2018-09-11 RX ADMIN — EPOETIN ALFA 10000 UNITS: 10000 SOLUTION INTRAVENOUS; SUBCUTANEOUS at 15:00

## 2018-09-11 RX ADMIN — NYSTATIN 1000000 UNITS: 100000 SUSPENSION ORAL at 12:09

## 2018-09-11 RX ADMIN — Medication 4 MG%: at 13:59

## 2018-09-11 RX ADMIN — INSULIN ASPART 1 UNITS: 100 INJECTION, SOLUTION INTRAVENOUS; SUBCUTANEOUS at 19:42

## 2018-09-11 RX ADMIN — ASPIRIN 81 MG: 81 TABLET, COATED ORAL at 08:25

## 2018-09-11 RX ADMIN — NYSTATIN 1000000 UNITS: 100000 SUSPENSION ORAL at 17:17

## 2018-09-11 RX ADMIN — TACROLIMUS 2 MG: 1 CAPSULE ORAL at 08:24

## 2018-09-11 RX ADMIN — PANTOPRAZOLE SODIUM 40 MG: 40 TABLET, DELAYED RELEASE ORAL at 08:25

## 2018-09-11 RX ADMIN — HYDRALAZINE HYDROCHLORIDE 100 MG: 100 TABLET ORAL at 08:25

## 2018-09-11 ASSESSMENT — ACTIVITIES OF DAILY LIVING (ADL)
ADLS_ACUITY_SCORE: 12
ADLS_ACUITY_SCORE: 12
COGNITION: 0 - NO COGNITION ISSUES REPORTED
AMBULATION: 1-->ASSISTIVE EQUIPMENT
TOILETING: 0-->INDEPENDENT
ADLS_ACUITY_SCORE: 13
TRANSFERRING: 0-->INDEPENDENT
RETIRED_COMMUNICATION: 0-->UNDERSTANDS/COMMUNICATES WITHOUT DIFFICULTY
SWALLOWING: 0-->SWALLOWS FOODS/LIQUIDS WITHOUT DIFFICULTY
ADLS_ACUITY_SCORE: 12
RETIRED_EATING: 0-->INDEPENDENT
DRESS: 0-->INDEPENDENT
BATHING: 0-->INDEPENDENT

## 2018-09-11 NOTE — PLAN OF CARE
Discharge Planner OT 6C  Patient plan for discharge: home  Current status: Pt supine in bed upon arrival, agreeable to session. Facilitated tub transfer to assess IND with home environment. Pt ambulated >500 ft to/from 7th floor gym. Pt completed 2 transfers SBA with set up environment. Facilitated aerobic exercise on the NuStep to increase activity tolerance for ADLs/IADLs. Pt completed 20 min (13 min wkl 2, 7 min wkl 3). See daily flow sheet for details regarding activity. Pt left supine in bed, call light in reach.   Barriers to return to prior living situation: inaccessible home, decreased activity tolerance, decreased IND with IADLs  Recommendations for discharge: TCU vs home  Rationale for recommendations: Pt is progressing well with therapy, would benefit from skilled rehab to increase activity tolerance and IND with IADLs. Pt has 26 stairs and has completed max 18 at this time. Pt may be able to discharge home pending further progress with therapy.        Entered by: Jennifer Deal 09/11/2018 11:06 AM

## 2018-09-11 NOTE — PROGRESS NOTES
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 Right TDC, EDW increased to 79.5 kg   - Will continue TTS schedule   - No heparin   - list for kidney transplant with wait time dating from 3/24/16 - currently inactive as too ill to undergo kidney transplantation surgery, he will not get organ offers but he will continue to accrue wait time      Volume status: EDW 79.5 kg. Though he has edema, his RHC 9/5 with RA 4 and wedge 10    - Continue pre run weights, standing   - Strict I/O  -  EDW 79.5 kg based on RHC from 9/5 (discussed with cardiology, ideally RA of 7; will continue with current EDW for now)     Hypertension: 130-150's  Could consider ACEi though has side effect of anemia and not ideal in this patient with current epo resistance and ongoing requirement of PRBC.    - Per cards, on hydralazine 100 mg qid, amlodipine 10 mg qday     Transplant IS regimen:  Per cardiology: Cellcept stopped, prednisone 5 mg bid, tacrolimus 2 mg bid. Tac level pending. Goal-6-8. If 9/5 biopsy negative plan is to decrease Prednisone per protocol and consider reduction in Cellcept if no improvement in Leukopenia.       BMD: Ca 7.6, alb 2.0, phos 2.4   - Added phos to dialysate today    GIB: resolved    Anemia: hgb 7.9; last transfusion 8/29.    - continue epogen to 10,000 units      Pericolonic abscess, small perircal abscess: Underwent I/D anorectal abscess on 8/13 and   "Sigmoidectomy/end colostomy on 8/14.      C diff: on PO vanc completed 9/6      Lung nodules - New Right lower lobe pulmonary nodules seen on CT this admission. ID recommending close f/u.  Has repeat CT 9/16.        Recommendations were communicated to primary team via progress note     Kristi Armas PA-C  Division of Kidney Disease  Pager [phone]      Interval History :   Seen on dialysis, stable run. Denies CP, SOB, n/v.       Review of Systems:   4 point ROS negative other than as noted above.    Physical Exam:   I/O last 3 completed shifts:  In: 240 [P.O.:240]  Out: 350 [Urine:100; Stool:250]   BP (!) 150/93  Pulse 95  Temp 98.3  F (36.8  C) (Oral)  Resp 16  Ht 1.753 m (5' 9\")  Wt 80 kg (176 lb 6.4 oz)  SpO2 98%  BMI 26.05 kg/m2     GENERAL APPEARANCE: alert, NAD  EYES: no scleral icterus, pupils equal  Pulmonary: normal work of breathing  CV: WWP   - Edema 1+ upper and lower extremity edema  NEURO: alert/oriented, face symmetric and speech is fluent  ACCESS: Right TDC    Labs:   All labs reviewed by me  Electrolytes/Renal -   Recent Labs   Lab Test  09/10/18   0640  09/09/18   0636  09/08/18   0612  09/07/18   1819  09/07/18   0534   09/05/18   0642   NA  137  135  134   --   136   < >  138   POTASSIUM  3.7  3.8  3.8   --   3.9   < >  4.1   CHLORIDE  100  98  98   --   99   < >  106   CO2  29  29  30   --   31   < >  28   BUN  20  12  20   --   12   < >  16   CR  4.50*  3.35*  4.61*   --   3.49*   < >  4.17*   GLC  108*  78  110*   --   78   < >  114*   TRINI  7.6*  7.9*  8.0*   --   8.0*   < >  8.2*   MAG  1.8   --    --   2.6*  1.5*   --   1.7   PHOS  2.4*   --    --    --   1.7*   --   2.2*    < > = values in this interval not displayed.       CBC -   Recent Labs   Lab Test  09/11/18   1342  09/10/18   0640  09/09/18   0636   WBC  1.2*  1.2*  1.0*   HGB  8.2*  7.9*  8.3*   PLT  179  160  167       LFTs -   Recent Labs   Lab Test  09/10/18   0640  09/03/18   0616  08/31/18   0646   ALKPHOS  141  138  " 133   BILITOTAL  0.5  0.5  0.5   ALT  14  14  14   AST  14  17  17   PROTTOTAL  5.0*  5.2*  4.7*   ALBUMIN  2.0*  1.9*  1.6*       Iron Panel -   Recent Labs   Lab Test  07/10/18   0711  05/08/18   0954  07/19/17   1306   IRON  66  58  46   IRONSAT  28  27  18   ALBERTINA  771*  621*  369       Current Medications:    sodium chloride 0.9%  250 mL Intravenous Once in dialysis     sodium chloride 0.9%  300 mL Hemodialysis Machine Once     amLODIPine  10 mg Oral Daily     aspirin  81 mg Oral Daily     atorvastatin  20 mg Oral QPM     fluticasone  1-2 spray Both Nostrils Daily     gelatin absorbable  1 each Topical During Hemodialysis (from stock)     heparin  3 mL Intracatheter During Hemodialysis (from stock)     heparin  3 mL Intracatheter During Hemodialysis (from stock)     hydrALAZINE  100 mg Oral Q8H JEAN     insulin aspart  1-6 Units Subcutaneous TID w/meals     insulin aspart  1-5 Units Subcutaneous At Bedtime     insulin glargine  16 Units Subcutaneous QAM AC     NEPHROCAPS  1 capsule Oral Daily     - MEDICATION INSTRUCTIONS -   Does not apply Once     nystatin  1,000,000 Units Swish & Swallow 4x Daily     pantoprazole  40 mg Oral QAM AC     predniSONE  5 mg Oral BID w/meals     sodium chloride (PF)  3 mL Intracatheter During Hemodialysis (from stock)     sodium chloride (PF)  3 mL Intracatheter During Hemodialysis (from stock)     sodium chloride (PF)  3 mL Intracatheter Q8H     tacrolimus  2 mg Oral BID IS     triamcinolone   Topical BID       IV fluid REPLACEMENT ONLY       Kristi Armas PA-C

## 2018-09-11 NOTE — INTERIM SUMMARY
[facility], [place]    Sepsis Evaluation Progress Note    Date of Service: 09/11/2018    I was called to see Murray Nicholson due to abnormal vital signs triggering the Sepsis SIRS screening alert. He is known to have a previous infection that was treated.  He continues to trigger the sepsis protocol due to tachycardia secondary to OHT and neutropenia.  He has remained afebrile and his vital signs have been stable.     Physical Exam    Vital Signs:  Temp: 98.1  F (36.7  C) Temp src: Oral BP: (!) 155/96 Pulse: 95 Heart Rate: 101 Resp: 16 SpO2: 100 % O2 Device: None (Room air)      Lab:  Lactic Acid   Date Value Ref Range Status   09/02/2018 1.3 0.7 - 2.0 mmol/L Final     Lactate for Sepsis Protocol   Date Value Ref Range Status   09/06/2018 1.2 0.7 - 2.0 mmol/L Final       The patient is at baseline mental status.    The rest of their physical exam is unchanged.  See progress note from today.    Assessment and Plan    The SIRS and exam findings are likely due to tachycardia secondary to post transplant and current neutropenia, there is no sign of sepsis at this time.    Disposition: The patient has an underlying condition of s/p heart transplant and neutropenia that will continue to affect their vital signs and should not have further labs drawn to assess sepsis unless their clinical appearance changes.    Kristi Ayon NP

## 2018-09-11 NOTE — PLAN OF CARE
Problem: Patient Care Overview  Goal: Plan of Care/Patient Progress Review  Pt working with OT in AM and in dialysis this PM. Encourage amb with nursing.

## 2018-09-11 NOTE — DISCHARGE SUMMARY
[facility]   Cardiology II Service / Advanced Heart Failure  Discharge Summary     Murray Nicholson MRN# [id number]   YOB: 1955 Age: 63 year old     DATE OF ADMISSION:  8/12/2018  DATE OF DISCHARGE:             9/11/2018  ADMITTING PROVIDER: Arcelia Blum MD  DISCHARGE PROVIDER: Kristi Ayon NP/Dr. Klever BLANK  PRIMARY PROVIDER:               Yahir Turcios    ADMIT DIAGNOSES:   1.    Hematochezia  2.    Fever and chills  3.    NICM s/p OHT  4.    ESRD  5.    RIJ thrombus  6.    DM II  7.    Acute on chronic anemia  8.    Abdominal pain and diarrhea   9.    Neutropenia  10.  Oral ulcer   11.  Cdiff  12.  HTN     DISCHARGE DIAGNOSES:   1.  Leukopenia  2.  NICM s/p OHT  3.  ESRD  5.  RIJ thrombus   6.  DM II  7.  Anemia  8.  Pericolonic and rectal abscess s/p laparoscopic sigmoid colectomy with end colostomy on 8/14.   9.  Pulmonary nodules  10. Oral ulcer secondary to pseudomonas  11. HTN      HPI: Please see the detailed H & P by Dr. Phelan from 8/12/2018. Briefly, Murray Nicholson is a 63 year old male with a h/o NICM s/p OHT on 6/14/18 , Cdiff, recent pseudomonas aeruginosa bacteremia on 7/26/18 with retained dialysis catheter, ESRD on HD, right internal jugular thrombus (treated with apixaban, currently on hold secondary to recent bleed per Dr. Ernst) and DM2 who presented this admission for hematochezia along with fevers (Tmax of 100.7), new mouth ulcer, and chills.      Transplant ID was consulted this admission. He was diagnosed  as C. difficile positive and was treated with vancomycin.  He underwent a CT of the abdomen for abdominal pain.  CT demonstrated a daniella-colonic abscess (7.8 cm), daniella-rectal abscess (2.7 cm), probably diverticulitis, and right colon colitis. GI and colorectal surgery were consulted.  I and D abscess drainage was performed on 8/12/18.  On 8/14/18, he underwent a lap sigmoidectomy w/ end colostomy and small bowel resection with takedown of small  "bowel to colon fistula which was complicated by ileus as well as concern for bleeding after initiation of anticoagulation, due to slowly down-trending Hgb.  (11.0 down to 8's).  He received 3 UPRBC transfusion.  Biopsy tested positive for Citrobacter freundii complex, VRE, Klebsiella pneumonia, pseudomonas aeruginosa, and Veillonella, for which he was treated with broad-spectrum antibiotic therapy through 8/27/2018.    In addition, he developed worsening leukopenia.  No acute concerns were seen on a peripheral smear done on 8/31.  Urine histoplasmosis urine antigen was negative. BC NGTD.  ID felt that the leukopenia was not due to a resurgent infection, and more likely due to immunosuppression with supratherapeutic tacrolimus levels.  (Tacro goal lowered to 6-8). Valcyte was discontinued on 9/5/18.  Cellcept was discontinued on 9/8/18.  He remained on prednisone 5 mg twice daily.  WBC improved to 1.2, with ANC of 0.9 upon discharge.      He will transfer to TCU today after dialysis.  We recommend obtaining blood cultures for any fevers.  Continue to monitor weekly CMV PCR's and Tacro levels, as well as checking his CBC with differential three times weekly.  If his ANC count continues to remain at or below 1.0, recommend GSC-F daily until level increases above 1.0 per ID's previous recommendations.  He will need follow up with colorectal surgery upon discharge from the TCU.  He will also need a repeat CT scan of his pulmonary nodules as well. (see below recommendations due on 9/16).  He will continue dialysis as ordered every T, Th, Sat.         PHYSICAL EXAM:  Blood pressure (!) 152/93, pulse 95, temperature 98.4  F (36.9  C), temperature source Oral, resp. rate 16, height 1.753 m (5' 9\"), weight 80 kg (176 lb 6.4 oz), SpO2 100 %.  GENERAL: Appears alert and oriented times three.   HEENT: Eye symmetrical and free of discharge bilaterally. Mucous membranes moist.  Ulcerated necrotic lesion noted on upper palate " behind front teeth.    NECK: Supple and without lymphadenopathy. JVD not appreciated.   CV:  Tachycardic, S1S2 present without murmur, rub, or gallop.   RESPIRATORY: Respirations regular, even, and unlabored. Lungs CTA throughout.   GI: Soft and non distended with normoactive bowel sounds present in all quadrants. No tenderness, rebound, guarding. No hepatomegaly. Ostomy bag in place left quadrant.  EXTREMITIES: No peripheral edema. 2+ bilateral pedal pulses.   NEUROLOGIC: Alert and orientated x 3. CN II-XII grossly intact. No focal deficits.   MUSCULOSKELETAL: No joint swelling or tenderness.   SKIN: No jaundice. No rashes or lesions.  No skin irritation or skin breakdown noted around ostomy site.    LABS:   Last CBC:   Recent Labs   Lab Test  09/10/18   0640   WBC  1.2*   RBC  2.48*   HGB  7.9*   HCT  25.0*   MCV  101*   MCH  31.9   MCHC  31.6   RDW  21.8*   PLT  160       Last CMP:  Recent Labs   Lab Test  09/10/18   0640   NA  137   POTASSIUM  3.7   CHLORIDE  100   TRINI  7.6*   CO2  29   BUN  20   CR  4.50*   GLC  108*   AST  14   ALT  14   BILITOTAL  0.5   ALBUMIN  2.0*   PROTTOTAL  5.0*   ALKPHOS  141       IMAGING:  Results for orders placed or performed during the hospital encounter of 08/12/18   CT Abdomen Pelvis w Contrast    Narrative    EXAMINATION: CT ABDOMEN PELVIS W CONTRAST, 8/12/2018 9:03 AM    TECHNIQUE:  Helical CT images from the lung bases through the  symphysis pubis were obtained with IV contrast. Contrast dose: 101 cc  of isovue 370    COMPARISON: CT cap 7/26/2018, 6/28/2018, CT abdomen pelvis 6/14/2018    HISTORY: 64 yo s/p heart transplant, recent neutropenia w/colitis on  CT, now w/worsening LUQ/LLQ abdominal pain and bright red blood per  rectum;     FINDINGS:    Abdomen and pelvis: The liver, spleen, and adrenal glands are normal.  Multiple hypoattenuating lesions in the pancreas, most prominently  there is a 12 x 9 mm lesion in the uncinate process (series 3 image  178) which previously  measured 13 x 7 mm, and in the proximal body  measuring 18 x 13 mm (series 3 image 101) previously 19 x 13 mm.    Atrophic kidneys. No hydronephrosis or renal calculus. Right midpole  simple renal cyst and additional bilateral too small to characterize  renal cortical hypoattenuating lesions in similar in size infiltration  the prior CT. There are bladder is decompressed. Prostate is  heterogeneous, similar to the prior CT.    Adjacent to the right distal rectum there is a 2.2 x 1.7 x 2.7 cm  fluid collection with a air-fluid level (series 3 image 4 and 38).  Thickening and inflammatory changes of the left colon, similar to the  prior CT. Mild periappendiceal stranding at the base of the appendix,  which is more normal distally, and likely reactive to the parameter  process of the ascending colon/cecum. Inflammatory changes of the at  least 2 segments of the sigmoid colon (in the left upper quadrant  (series 3 image 213) where there is mesenteric fat stranding and bowel  wall thickening, and distal sigmoid colon (series 3 images 332-275,  coronal series 4 image 36). Adjacent to the inflamed distal sigmoid  colon there is a air and fluid filled collection which extends into  the central abdomen measuring 5.6 x 4.3 x 7.8 cm and abuts loops of  small bowel.    Thick and bowel wall of the first and second portion of the duodenum  with fat attenuation in the wall, similar in appearance to the prior  CT. Small sliding type hernia.    Small amount of free fluid in the pelvis. No free air. No enlarged  abdominal lymph nodes. The portal venous system is patent. Abdominal  aorta is normal caliber.    Lung bases: Trace pericardial effusion. Partially visualized  sternotomy wires. No pleural effusion. New 5 mm pulmonary nodule in  the right lower lobe (series 3 image 39) and 3 mm pulmonary nodule in  the posterior right lower lobe (series 3 image 13).    Bones and soft tissues: Degenerative changes of the hips, including  a  large geodes of the hips. No aggressive osseous lesion.      Impression    IMPRESSION:   Multiple inflamed loops of bowel:  1 a. Inflamed segment of distal sigmoid colon in the setting of  diverticulosis, most likely diverticulitis. This is complicated by a  pericolonic abscess measuring up to 7.8 cm, which abuts multiple loops  of small bowel.   1 b. Inflamed loop of proximal sigmoid colon, most compatible with  uncomplicated diverticulitis.  1 c. Inflammation of the right colon compatible with colitis,  typhlitis is a consideration setting of neutropenia.  1 e. Unchanged inflammation of the proximal duodenum, with a chronic  appearance. Peptic ulcer disease is a consideration.  2. Small perirectal abscess, measuring up to 2.7 cm.  3. New right lower lobe pulmonary nodules measuring 5 and 3 mm,  respectively. Recommend short-term follow-up CT chest in 3 months.  4. Stable size of multiple stable pancreatic IPMNs.    [Result: Abscess, colitis]    Finding was identified on 8/12/2018 9:33 AM.     Dr. Bearden was contacted by Dr. Gloria at 8/12/2018 10:39 AM and  verbalized understanding of the urgent finding.     I have personally reviewed the examination and initial interpretation  and I agree with the findings.    JOVANNA PÉREZ MD   US Upper Extremity Venous Duplex Right Portable    Narrative    EXAMINATION: DOPPLER VENOUS ULTRASOUND OF THE RIGHT UPPER EXTREMITY,  8/12/2018 8:20 AM     COMPARISON: Venous duplex ultrasound 6/22/2018    HISTORY: Evaluate right IJ thrombus    TECHNIQUE:  Gray-scale evaluation with compression, spectral flow and  color Doppler assessment of the deep venous system of the right upper  extremity.    FINDINGS:  Right: The low right IJ is partially compressible. The high right IJ  is fully compressible and patent. Normal blood flow and waveforms are  demonstrated in the innominate, subclavian, and axillary veins.       Impression    IMPRESSION:  1.  Unchanged nonocclusive DVT in the low  right internal jugular vein.    I have personally reviewed the examination and initial interpretation  and I agree with the findings.    JOVANNA PÉREZ MD   CT Abdomen Pelvis w Contrast     Value    Radiologist flags Lung nodules, enhancing possible mass in small    Narrative    CT ABDOMEN PELVIS W CONTRAST   8/18/2018 6:38 PM      HISTORY: 62 yo w/recent colectomy, now with emesis/abdominal pain and  no ostomy output, ? ileus vs obstruction;     COMPARISON: CT abdomen and pelvis 8/12/2018    Technique: CT of the abdomen and pelvis were obtained with IV  contrast. Images were reconstructed in sagittal and coronal planes.    Contrast: 107cc of Isovue 370    Dose: 1149  mGy*cm    Findings:    Postsurgical changes of partial sigmoidectomy, partial small bowel  resection repair and left lower quadrant colostomy. Residual oral  contrast in the ascending, transverse and distal colon. The colon is  not distended. Multiple fluid-filled dilated small bowel loops with  preserved thin enhanced wall. The distal small bowel loops are  decompressed. An abrupt transition is not present, however there is  gradual transition seen in the left lower quadrant on approximately  series 3 image 323. Additionally, near the site of transition, there  is an irregular asymmetric area of apparent enhancement measuring  approximately 1.4 cm on the left wall of small bowel seen on series 3  image 315. The stomach is distended. Mild edema versus peristalsis of  the pylorus.    Small sliding hiatal hernia. Small ascites and small amount of free  air, potentially postsurgical. No portal venous gas. Small volume of  fluid around the spleen, liver and lower small bowel loops. Diffuse  mesenteric edema. The major abdominal vessels are patent.    Liver, gallbladder, spleen, adrenal glands, kidneys are unremarkable.  Right simple renal cysts. No hydronephrosis. Urinary bladder is  decompressed. Numerous unchanged pancreatic cystic lesions, largest  in  the neck measures approximately at the 1.7 cm..    Bone and soft tissue: Moderate anasarca. No acute osseous abnormality.  Healing of the midline and ventral incision.    Lower lungs: Small left pleural effusion. Unchanged pulmonary nodule  including 5 mm right basilar pulmonary nodule.      Impression    Impression:  1.  Postsurgical changes of partial sigmoidectomy, partial small bowel  resection and left lower quadrant colostomy. Small amount of free air  and ascites, potentially postsurgical.  2.  Dilated loops of bowel potentially representing ileus or mild  partial obstruction.  3.  1.5 cm area of focal apparent enhancement in the small bowel the  left lower quadrant. This is indeterminate, though follow-up is  warranted as it may represent a small enhancing mass. Consider  outpatient follow-up CT enterography or MR enterography.  4. Small left pleural effusion. Lung base pulmonary nodules again  seen, unchanged from 8/12/2026. Consider follow-up imaging in 3  months.    [Consider Follow Up: Lung nodules, enhancing possible mass in small  bowel.]    This report will be copied to the [facility] to ensure a  provider acknowledges the finding.     I have personally reviewed the examination and initial interpretation  and I agree with the findings.    BAILEY JEFFERSON MD   XR Abdomen Port 1 View    Narrative    Exam: XR ABDOMEN PORT 1 VW, 8/18/2018 11:50 PM    Indication: placement of NG tube;     Comparison: CT abdomen and pelvis 8/18/2018    Findings:   Supine frontal x-ray of the abdomen. The most inferior portions of the  abdomen were collimated out of the field-of-view. Enteric tube tip and  sidehole projects over the stomach. Median sternotomy wires, partially  visualized are intact. Nonspecific bowel gas pattern with gaseous  distention of bowel loops in the central and right lower quadrant.  Central venous catheter projects over the right atrium. Left basilar  opacities likely  atelectatic.      Impression    Impression:   1. Enteric tube tip and sidehole project over the proximal stomach.  2. Nonspecific bowel gas pattern with gaseous distention of bowel  loops in the central abdomen and right lower quadrant.    I have personally reviewed the examination and initial interpretation  and I agree with the findings.    PATO PAULSON MD   XR Abdomen Port 1 View    Narrative    Exam: Abdominal x-ray, 1 view, 8/20/2018.    COMPARISON: 8/18/2018.    HISTORY: NG location, output decreased.    FINDINGS: Supine view of the abdomen was obtained. NG/OG tube with its  tip projecting over the stomach. Stable gaseous distention of the  stomach. Stable gaseous distention of large and small bowel lobes.  Contrast material in the descending colon. Left lower quadrant ostomy.  Degenerative changes of the spine. No pneumatosis. No portal venous  gas. The most inferior portions of the abdomen were collimated out of  the image. Bilateral lower lobes atelectasis and/or consolidation.  Partially visualized median sternotomy wires and central line with its  tip projecting over the right atrium.      Impression    IMPRESSION:  1. NG/OG tube with its tip and sidehole projecting over the stomach.  2. Stable gaseous distention off the, an loops of large and small  bowel bowel.    PATO PAULSON MD   CT Enterography with Contrast    Narrative    EXAMINATION: CT ENTEROGRAPHY WITH CONTRAST, 8/29/2018 7:59 PM    TECHNIQUE:  Helical CT images from the lung bases through the  symphysis pubis were obtained with IV contrast. Contrast dose: 107ml  isovue 370    COMPARISON: CT abdomen pelvis 8/18/2018, 8/12/2018, CT cap 7/28/2018    HISTORY: compare to previous, f/u 1.5 cm enhancement in LLQ small  bowel, pt immunosuppressed, lwo grade temp;     FINDINGS:    Abdomen and pelvis:   Postoperative changes of partial sigmoidectomy and left lower quadrant  and colostomy. Prominent appendix full of stool and air, measuring  up  to 9 mm, but without periappendiceal fat stranding or change in  caliber from the prior CT. The large and small bowel are normal  caliber. Small sliding-type hernia.    The small intraluminal small bowel enhancing focus is again visualized  and measures 1.4 x 1.1 cm, previously 1.3 x 1.0 cm and not  significantly changed.    The liver, spleen, and adrenal glands are normal. Multifocal  hypoattenuating cystic lesions in the pancreas, not significantly  changed from the prior exam. For example there is a 18 x 11 mm cystic  lesion in the neck of the pancreas (series 5 image 108) and a 15 x 8  mm lesion in the uncinate process. No pancreatic ductal dilation.    Multiple simple renal cysts in similar size and configuration the  prior exam, for example a 3.5 cm pseudocyst in the right midpole.  Additional smaller renal cortical lesions which are too small to fully  characterize. No hydronephrosis, renal calculus, or renal mass.  Urinary bladder is decompressed and otherwise unremarkable. No pelvic  mass.    Small amount of ascites in the or pelvis. No free air. No enlarged  lymph nodes. Infrarenal aorta is normal caliber. Portal venous system  is patent.    Lung bases: Decreased small left pleural effusion. Bibasilar  atelectasis. 6 mm right lower lobe pulmonary nodule (series 5 image  15), which is increased in size from the prior CT when it measured 3  mm. Unchanged 4 mm solid pulmonary nodule in the right lower lobe  (series 5 image 20.    Bones and soft tissues: No aggressive osseous lesion. Degenerative  changes of the bilateral hips.      Impression    IMPRESSION:   1. Redemonstration of the enhancing lesion in the left lower quadrant  small bowel measuring up to 1.4 cm. This may represent a small polyp  or GIST.  2. Interval improvement in the previously seen dilation of the small  bowel. Stable postoperative changes of partial sigmoidectomy and left  lower quadrant colostomy.  3. Increased size of a 6 mm right  lower lobe pulmonary nodule, which  previously measured 3 mm. The relatively short period of time and  significant increase in size favors an infectious or inflammatory  etiology, however this is not definitive. Additional sub-5 mm  pulmonary nodules are unchanged. Recommend three-month follow-up CT.  4. Multiple unchanged intraductal pancreatic mucinous neoplasms.  5. Decreased small left pleural effusion.    I have personally reviewed the examination and initial interpretation  and I agree with the findings.    YEVGENIY DELEON MD   US Upper Extremity Venous Duplex Right    Narrative    EXAMINATION: DOPPLER VENOUS ULTRASOUND OF RIGHT UPPER EXTREMITY,  9/5/2018 2:51 PM     COMPARISON: Vascular ultrasound (neck) 8/12/2018.    HISTORY: Follow-up right internal jugular thrombosis    TECHNIQUE:  Gray-scale evaluation with compression, spectral flow, and  color Doppler assessment of the deep venous system of right upper  extremity.    FINDINGS:  Normal blood flow and waveforms are demonstrated in the right  innominate, subclavian, and axillary veins.     The high right internal jugular vein is fully compressible. Unchanged  nonocclusive thrombus to the lower right internal jugular vein.     There is normal compressibility of the brachial, basilic and cephalic  veins bilaterally.      Impression    IMPRESSION:  1.  Unchanged appearance to the nonocclusive thrombus to the lower  right internal jugular vein. No other evidence of right upper  extremity DVT.    I have personally reviewed the examination and initial interpretation  and I agree with the findings.    PATO PAULSON MD   US Upper Extremity Venous Duplex Right Portable    Narrative    Examination:  Right internal jugular venous US 9/11/2018 10:33 AM     Comparison: Right internal jugular venous ultrasound dated 8/12/2018.    History: reassess RIJ thrombus;     Findings:     Redemonstration of echogenic thrombus in the right internal jugular at  neck, the  lower neck segments demonstrates no compression, previously  was partially compressible on 8/12/2018.       Impression    Impression: Occlusive lower neck internal jugular vein thrombus,  appears more chronic versus slightly worsening than 8/12/2018.      I have personally reviewed the examination and initial interpretation  and I agree with the findings.    ARMIDA CASTANON MD     *Note: Due to a large number of results and/or encounters for the requested time period, some results have not been displayed. A complete set of results can be found in Results Review.       PROCEDURES:  1.  [facility] 9/5/18 with Heart biopsy:  0R    HEMODYNAMICS:  BSA 2.0  1. HR 96 bpm  2. /81 mmHg  3. RA 5/8/4   4. RV 25/5  5. PA 30/15/20   6. PCW 10   7. PA sat 73.6%   8. PCW sat not obtained  9. Hgb 8.2 g/dL   10. Estimated Kassi CO 10.0   11. Estimated Kassi CI 5.06   12. TD CO 7.67   13. TD CI 3.88   14. PVR 1.0         ENDOMYOCARDIAL BIOPSY:  1. Successful collection of 4 right ventricular endomyocardial biopsy samples using the [place].        Sheath Removal:  The venous sheath was manually removed in the cardiac catheterization laboratory.     Contrast: Isovue, 5 ml      Fluoroscopy Time: 13.5 min     COMPLICATIONS:  1. None     SUMMARY:   >> Normal right sided filling pressures.  >> Normal left sided filling pressures.  >> Normal PA pressures  >> Normal cardiac output.  >> Successful collection of 4 right ventricular biopsy specimens    2.  [facility] 8/24/18 with biopsy:  0R  Right Heart Catheterization:  /80/103  HR 92  BSA 2.0     RA 2/1/1   RV 24/1  PA 24/14/17   PCW 5/4/3   PA sat 65.4%  PCW sat 93%   Hgb 7.0 g/dL   Kassi CO 7.5   Kassi CI 3.8   TD CO 6.0   TD CI 3.0   PVR 1.9  TPR 13.6     Endomyocardial biopsy  After informed consent patient was prepped and draped in the usual fashion. A 7 Ni[place]an sheath was placed in the left internal jugular after local anesthesia with 1.0 % lidocaine. A 5.5 Ni[place]an  bioptome was passed  through the sheath to the right ventricular septum and 5 biopsies were obtained. The biopsy specimens were sent to Pathology for examination. The sheath was removed and hemostasis was obtained. The patient tolerated the procedure well and there were no complications.     COMPLICATIONS:   None     IMPRESSION:   1. Normal right-sided and normal left-sided filling pressures.   2. Normal pulmonary artery pressures with a mean pulmonary artery pressure of 17 mmHg.  3. Normal cardiac output (7.5 L/min) and cardiac index (3.8)  4. Successful endomyocardial biopsy with 5 specimens sent for pathologic review.    3.  I and D of anorectal abscess:  PREOPERATIVE DIAGNOSES:   1.  Anorectal abscess.   2.  Acute diverticulitis with abscess.   3.  Pseudomonas bacteremia.   4.  Chronic kidney disease (on hemodialysis).   5.  Dyslipidemia.   6.  Hypertension.   7.  Hypertensive cardiopathy.   8.  Anemia of chronic disease and iron deficiency.     9.  Ascending aortic aneurysm.   10.  Diabetes mellitus type 2.   11.  Heart transplant.   12.  Acquired immunosuppression.   13.  Proteocaloric malnutrition.       POSTOPERATIVE DIAGNOSES:   1.  Anorectal abscess.   2.  Acute diverticulitis with abscess.   3.  Pseudomonas bacteremia.   4.  Chronic kidney disease (on hemodialysis).   5.  Dyslipidemia.   6.  Hypertension.   7.  Hypertensive cardiopathy.   8.  Anemia of chronic disease and iron deficiency.     9.  Ascending aortic aneurysm.   10.  Diabetes mellitus type 2.   11.  Heart transplant.   12.  Acquired immunosuppression.   13.  Proteocaloric malnutrition.       ANESTHESIA:  MAC plus local anesthetic.       PROCEDURES PERFORMED:   1. Evaluation under anesthesia.   2. Incision and drainage of anorectal abscess.       SURGEON:  Rick Tran MD.       ASSISTANT:  Richard Avendano MD (Colon and Rectal Surgery fellow).           DESCRIPTION OF PROCEDURE:  After obtaining informed consent, the patient was brought to the Operating  "Room, placed in the modified lithotomy position.  MAC anesthesia was gently induced without difficulty.  The patient received appropriate preoperative antibiotic prophylaxis as well as mechanical DVT prophylaxis.  Bilateral lower extremity pneumatic compression devices were applied and all pressure points were cushioned.  The perianal area was prepped and draped in a standard sterile fashion.  A \"timeout\" was performed.  A total of 30 mL of bupivacaine 0.25% without epinephrine was injected as a pudendal block.  Digital rectal exam as well as anoscopy were performed.  These revealed active purulent drainage from the right posterior aspect of the anal canal.  There was an opening right at the intersphincteric groove that was spontaneously draining the abscess.  This was further opened approximately 2 cm.  There was no involvement of the anal sphincter complex.  We evacuated approximately 10 mL of purulent material.  No cultures were sent.  The cavity was irrigated with dilute peroxide.  Hemostasis was achieved with monopolar electrocautery.  There were multiple oozing sites despite extensive cauterization.  Peroxide was applied once again.  The entire abscess cavity wound was marsupialized with a running locking 3-0 Vicryl suture.  A piece of Gelfoam was left in place.  Instrument, sponge and needle counts were all correct as reported to me.  Hemostasis was corroborated.  A sterile dressing was applied.  The patient tolerated the procedure well.       COMPLICATIONS:  None immediately.       ESTIMATED BLOOD LOSS:  30 mL.       REPLACEMENT:  400 mL of crystalloid.       SPECIMENS:  None.       FINDINGS:  Right posterior anorectal abscess with no evidence of fistula or necrotizing infection.       DISPOSITION:  PACU.     4. Laparoscopic Hand Assisted Takedown of Splenic Flexure, Sigmoidectomy, Small Bowel Resection, Takedown of Small Bowel to Colon Fistula -Brief Operative Note     Pre-operative diagnosis:   Sigmoid " Diverticulitis   Post-operative diagnosis   Sigmoid diverticulitis, jejunocolonic fistula  Procedure(s):  Laparoscopic Hand Assisted Takedown of Splenic Flexure, Sigmoidectomy, Small Bowel Resection, Takedown of Small Bowel to Colon Fistula - Wound Class: II-Clean Contaminated  Surgeons: Rick Tran MD - Primary, Julita Robledo MD - Resident - Assisting, Dianne Chen MD - Resident - Assisting  Anesthesia:  Combined General with Block                        Estimated blood loss: 150 mL  Drains:  None  Specimens:                 ID Type Source Tests Collected by Time Destination   1 : Abdominal Culture Other (specify in comments) Abdomen ANAEROBIC BACTERIAL CULTURE, FLUID CULTURE AEROBIC BACTERIAL Rick Tran MD 8/14/2018  3:17 PM     A : small bowel resection with fistula Tissue Other SURGICAL PATHOLOGY EXAM Rick Tran MD 8/14/2018  2:27 PM     B : Sigmoidectomy Tissue Colon SURGICAL PATHOLOGY EXAM Rick Tran MD 8/14/2018  3:06 PM        Findings:  proximal jejunal loop densely adherent to sigmoid colon abscess cavity with fistulous conection, terminal ileum also adherent to sigmoid colon without evidence of fistula, paracolonic abscess cavity, grossly normal colon proximal to sigmoid and normal rectum.  Complications:   None.  Implants:            None.         CONSULTATIONS:   1.  Transplant ID  2.  Colorectal surgery  3.  Endocrinology  4.  Nephrology  5.  Interventional Radiology  6.  [facility] nurse  7.  Nutrition  8.  Social work  9. Anesthesiology    HOSPITAL COURSE:     Neutropenic leukopenia:    -WBC today:  1.2.  ANC 0.9.   -Continue Neutropenic precautions.    -Continue to check CMV viral loads weekly as he is off Valcyte.  -Continue to monitor CBC with differential especially WBC and ANC counts three times a week.  If ANC counts continue to remain belowor equal to 1.0, may give one or more G-CSF doses until ANC count is greater than 1.0.     -Immunknow 95 on  with an associated WBC count of 1.2 and 0.8 ANC.   -Repeat Immunknow level once WBC/ANC count normalizes.   -Continue with Tacrolimus and Prednisone at current doses for immunosuppression.      History of: NICM  S/p OHT on 18  Serostatus: HSV1-, HSV2+, CMV D+/R-, EBV D?/R+, VZV+     Immunosuppression:  Cellcept:   Discontinued on  due to leukopenia.  Tacrolimus:  2 mg twice daily. Goal 6-8.  Tacro level  5.8 on 9/10. Continue 2 mg twice daily. Repeat Tacro level in one week.   Prednisone:  5 mg twice daily.  Continue current dose, and don't decrease per protocol for now per Dr. Shearer as he is only on Tacro and Prednisone for immunosuppression secondary to low WBC count.  Next RHC and biopsy due in two weeks. Dr. Ernst, his primary cardiologist will give further recommendations regarding immunosuppression based on next biopsy results.      Prophylaxis:  CAV:  ASA 81 mg daily.  Statin:  Atorvastatin 20 mg daily   Thrush:  Nystatin 1,000,000 units 4 times daily.   GI:  Protonix 40 mg daily.   Serostatus: HSV1-, HSV2+, CMV D+/R-, EBV D?/R+, VZV+.   Valcyte:  Discontinued on 18.  Continue checking weekly CMV PCR's  Osteoporosis prevention: Calcium/Vitamin D:  Renal team is managing his calcium with his dialysate solution.     Studies:  Recent Results (from the past 4320 hour(s))   Echocardiogram Complete    Narrative    [id number]  ECH19  [id number]  [id number]^MANDEEP^MARY^M           [facility],[place]  Echocardiography Laboratory  [address] [place] [id number]     Name: SANCHEZ MCNEIL  MRN: [id number]  : 1955  Study Date: 2018 09:20 AM  Age: 63 yrs  Gender: Male  [place] [facility] [place]: Heart [place]  Ordering Physician: MARY KAUFMAN  Performed By: BHARATH Barahona     BSA: 2.0 m2  Height: 69 in  Weight: 176 lb  BP: 155/96  mmHg  _____________________________________________________________________________  __        Procedure  Complete Portable Echo Adult.  _____________________________________________________________________________  __        Interpretation Summary  Global and regional left ventricular function is hyperkinetic with an EF of  65-70%.  Right ventricle size and systolic function is normal.  No significant valve disease and there is slight decrease in LVEF     _____________________________________________________________________________  __        Left Ventricle  Global and regional left ventricular function is hyperkinetic with an EF of  65-70%. Left ventricular wall thickness is normal. Diastolic function not  assessed due to heart transplant.     Right Ventricle  The right ventricle is normal size. Global right ventricular function is  normal. There is mild right ventricular hypertrophy.     Atria  The left atrium is enlarged due to cardiac transplantation. The right atrium  is enlarged due to cardiac transplantation.        Mitral Valve  The mitral valve is normal.     Aortic Valve  Aortic valve is normal in structure and function.     Tricuspid Valve  The tricuspid valve is normal. Trace tricuspid insufficiency is present. The  peak velocity of the tricuspid regurgitant jet is not obtainable. Pulmonary  artery systolic pressure cannot be assessed.     Pulmonic Valve  The pulmonic valve is normal.     Vessels  The aorta root is normal. The inferior vena cava was normal in size with  preserved respiratory variability. Estimated mean right atrial pressure is 3  mmHg (normal).     Pericardium  No pericardial effusion is present.        Compared to Previous Study  This study was compared with the study from 6/20/18 . The left ventricular  function has remained the same.  _____________________________________________________________________________  __     MMode/2D Measurements & Calculations  IVSd: 0.90 cm  LVIDd: 4.8  cm  LVIDs: 2.8 cm  LVPWd: 1.1 cm  FS: 41.4 %  LV mass(C)d: 174.6 grams  LV mass(C)dI: 89.2 grams/m2  LA Volume (BP): 141.0 ml  LA Volume Index (BP): 71.9 ml/m2  RWT: 0.48           Doppler Measurements & Calculations  MV E max blake: 85.9 cm/sec  MV A max blake: 68.9 cm/sec  MV E/A: 1.2  MV dec time: 0.14 sec  E/E' av.2  Lateral E/e': 7.2  Medial E/e': 15.3     _____________________________________________________________________________  __           Report approved by: Sunny Sloan 2018 10:14 AM      Echocardiogram    Narrative    [id number]  ECH19  [id number]  [id number]^YANELY^YANELY^           [facility]  Diagnostic and Treamtent-3rd Floor  [address] [place] [address]     Name: SANCHEZ MCNEIL  MRN: [id number]  : 1955  Study Date: 2018 10:12 AM  Age: 63 yrs  Gender: Male  [place] [facility] [place] Study: Heart replaced by transplant (H)  Ordering Physician: YANELY NY  Referring Physician: YANELY NY  Performed By: BHARATH Barahona     BSA: 2.0 m2  Height: 69 in  Weight: 176 lb  BP: 125/66 mmHg  _____________________________________________________________________________  __        Procedure  Echocardiogram with two-dimensional, color and spectral Doppler performed.  _____________________________________________________________________________  __        Interpretation Summary  Global and regional left ventricular function is hyperkinetic with an EF >70%.  Mild-moderate left ventricular hypertrophy.  Right ventricle with normal size and systolic function with mild hypertrophy.  No significant valve disease and no change in LVEF.  _____________________________________________________________________________  __        Left Ventricle  Global and regional left ventricular function is hyperkinetic with an EF >70%.  Mild to moderate concentric wall thickening consistent with left  ventricular  hypertrophy is present. Relative wall thickness is increased consistent with  concentric remodeling. Diastolic function not assessed due to heart  transplant.     Right Ventricle  The right ventricle is normal size. Global right ventricular function is  normal. There is mild right ventricular hypertrophy.     Atria  The left atrium is enlarged due to cardiac transplantation. The right atrium  is enlarged due to cardiac transplantation.     Mitral Valve  The mitral valve is normal.        Aortic Valve  Aortic valve is normal in structure and function.     Tricuspid Valve  The tricuspid valve is normal. Trace tricuspid insufficiency is present. The  peak velocity of the tricuspid regurgitant jet is not obtainable. Pulmonary  artery systolic pressure cannot be assessed.     Pulmonic Valve  The pulmonic valve is normal.     Vessels  The inferior vena cava was normal in size with preserved respiratory  variability. The aorta root is normal. Estimated mean right atrial pressure is  3 mmHg (normal).     Pericardium  No pericardial effusion is present.        Compared to Previous Study  This study was compared with the study from 6/20/18 . The left ventricular  function has remained the same.  _____________________________________________________________________________  __  MMode/2D Measurements & Calculations     IVSd: 1.1 cm  LVIDd: 4.2 cm  LVIDs: 2.4 cm  LVPWd: 1.2 cm  FS: 42.9 %  LV mass(C)d: 165.7 grams  LV mass(C)dI: 84.7 grams/m2  LA Volume (BP): 82.9 ml  LA Volume Index (BP): 42.3 ml/m2  RWT: 0.57        Doppler Measurements & Calculations  MV E max blake: 69.2 cm/sec  MV A max blake: 50.2 cm/sec  MV E/A: 1.4     MV dec time: 0.16 sec  E/E' avg: 10.2  Lateral E/e': 5.3  Medial E/e': 15.1     _____________________________________________________________________________  __           Report approved by: Sunny Sloan 07/20/2018 10:48 AM                     [facility] 9/5/18:   BSA 2.0  1. HR 96 bpm  2. BP  138/81 mmHg  3. RA 5/8/4   4. RV 25/5  5. PA 30/15/20   6. PCW 10   7. PA sat 73.6%   8. PCW sat not obtained  9. Hgb 8.2 g/dL   10. Estimated Kassi CO 10.0   11. Estimated Kassi CI 5.06   12. TD CO 7.67   13. TD CI 3.88   14. PVR 1.0       BIOPSIES/HISTORY OF GRAFT REJECTION:   Negative biopsy 9/5/18 and 8/24/18.   Fluid Status:  Euvolemic.  Dialysis per scheduled routine this afternoon.    HTN:  Lisinopril held secondary to acute anemia.  Restarted at a lower dose prior to discharge.    -Continue Hydralazine 100 mg three times daily, amlodipine 10 mg daily, and restart Lisinopril 2.5 mg daily.      ESRD on Hemodialysis:  Remains inactive on renal transplant list.   -Nephrology following  -Continue Hemodialysis every T, Th, Sat     Pericolonic abscess, rectal abscess and colitis:    - Continue Miralax daily as needed for constipation.  - Follow up with colorectal surgery as an outpatient.   - Continue regular diet.      RIJ thrombus:  Diagnosed on US on 8/12/18.  Nonocclusive.  Repeat ultrasound on 9/11/18:  Occlusive thrombus to the lower right internal jugular.  -Remain off Eliquis per Dr. Ernst, his primary cardiologist secondary to recent significant bleed.     Pseudomonas bacteremia associated with a retained hemodialysis cathether:  History of positive blood cultures from 7/26/18.  BC since have been negative.  He received a long course of antibiotics that are active against Pseudomonas (cefepime through 8/23/18, then ciprofloxacin through 8/27/18). There is still a tiny residual risk per ID that he may have recrudescence of bacteremia as result of biofilm formation on the line that was retained, so we should repeat blood cultures with any fevers.    Necrotic oral ulcer secondary to pseudomonas: Viral testing during last admission was negative.  Wound culture grew Pseudomonas in addition to oral tiffany. Ulcer was improving per record review.    -Continue to monitor.      Chronic/Resolved Medical  Conditions:  Pulmonary Nodules. Stable RLL nodules measuring 5 mm and 3 mm per CT 8/12 and 8/29. Repeat Chest CT due 9/16.  DM Type II. Continue Lantus and medium sliding scale insulin.   CDIFF. Completed 10 day course of oral Vanco 9/6/18.  Acute on Chronic Anemia. Likely acute exacerbation in setting of colitis. Heparin gtt held since 8/22. Received 3 unit PRBC's. Lisinopril held in setting of anemia exacerbation. Per colorectal surgery, no scope due to risk of disruption of anastomosis. Peripheral smear with no acute concerns 8/31. Hgb remains stable. Continue Epo per renal. Continue ASA as previously recommended.   Non-Severe Protein Calorie Malnutrition. Albumin 1.9 9/3/18. Oral intake improving. Changed to regular diet.       PENDING RESULTS: None.     DISCHARGE MEDICATIONS:  Discharge Medication List as of 9/11/2018  5:26 PM      START taking these medications    Details   amLODIPine (NORVASC) 10 MG tablet Take 1 tablet (10 mg) by mouth daily, Disp-30 tablet, Historical      glucagon 1 MG SOLR injection Inject 1 mg Subcutaneous every 15 minutes as needed for low blood sugar (May repeat x 1 only), Historical      glucose 40 % (400 mg/mL) GEL gel Take 15-30 g by mouth every 15 minutes as needed for low blood sugarHistorical      !! insulin aspart (NOVOLOG PEN) 100 UNIT/ML injection Inject 1-6 Units Subcutaneous 3 times daily (with meals), Historical      !! insulin aspart (NOVOLOG PEN) 100 UNIT/ML injection Inject 1-5 Units Subcutaneous At Bedtime, Historical      insulin glargine (LANTUS SOLOSTAR) 100 UNIT/ML pen Inject 16 Units Subcutaneous every morning (before breakfast), Historical      pentamidine (NEBUPENT) 300 MG neb solution Inhale 300 mg into the lungs once for 1 dose, Disp-300 mg, R-0, Historical      polyethylene glycol (MIRALAX/GLYCOLAX) Packet Take 17 g by mouth daily as needed for constipation, Disp-7 packet, Historical      simethicone (MYLICON) 80 MG chewable tablet Take 1 tablet (80 mg) by  mouth every 6 hours as needed for cramping, Disp-180 tablet, Historical      !! sodium chloride, PF, 0.9% PF flush 3 mLs by Intracatheter route every hour as needed for line flush (for peripheral IV flush post IV meds), Historical      !! sodium chloride, PF, 0.9% PF flush 3 mLs by Intracatheter route every 8 hours, Historical       !! - Potential duplicate medications found. Please discuss with provider.      CONTINUE these medications which have CHANGED    Details   hydrALAZINE (APRESOLINE) 100 MG TABS tablet Take 1 tablet (100 mg) by mouth every 8 hours, Disp-60 tablet, Historical      lisinopril (PRINIVIL/ZESTRIL) 2.5 MG tablet Take 1 tablet (2.5 mg) by mouth daily, Historical      predniSONE (DELTASONE) 5 MG tablet Take 1 tablet (5 mg) by mouth 2 times daily (with meals), Historical      tacrolimus (GENERIC EQUIVALENT) 1 MG capsule Take 2 capsules (2 mg) by mouth 2 times daily, Historical      traMADol (ULTRAM) 50 MG tablet Take 1 tablet (50 mg) by mouth every 12 hours as needed for moderate pain, Disp-10 tablet, R-0, Local Print         CONTINUE these medications which have NOT CHANGED    Details   acetaminophen (TYLENOL) 325 MG tablet Take 2 tablets (650 mg) by mouth every 4 hours as needed for mild pain (multimodal surgical pain management along with NSAIDS and opioid medication as indicated based on pain control and physical function.), Disp-100 tablet, R-0, Local Print      albuterol (PROAIR HFA/PROVENTIL HFA/VENTOLIN HFA) 108 (90 Base) MCG/ACT inhaler Inhale 2 puffs into the lungs every 4 hours as needed for shortness of breath / dyspnea or wheezing, Historical      ASPIRIN 81 MG OR TABS Take 1 tablet (81 mg) by mouth at bedtime, Historical      biotin (BIOTIN 5000) 5 MG CAPS Take 5 mg by mouth daily, Disp-30 capsule, R-3, E-Prescribe      blood glucose monitoring (ACCU-CHEK FASTCLIX) lancets Use to test blood sugar 2-3 times daily or as directed.  Ok to substitute alternative if insurance prefers.,  Disp-102 each, R-11, E-Prescribe      blood glucose monitoring (NO BRAND SPECIFIED) test strip Use to test blood sugar 2-3 times daily or as directed., Disp-100 each, R-11, E-Prescribe      fluticasone (FLONASE) 50 MCG/ACT spray Spray 1-2 sprays into both nostrils daily, Disp-16 g, R-11, E-Prescribe      loratadine (CLARITIN) 10 MG tablet Take 10 mg by mouth daily Reported on 5/3/2017, Historical      melatonin 1 MG TABS tablet Take 2 tablets (2 mg) by mouth nightly as needed for sleep, Disp-120 tablet, R-1, E-Prescribe      NEPHROCAPS 1 MG capsule Take 1 capsule by mouth daily, Disp-120 capsule, R-0, Local Print      nystatin (MYCOSTATIN) 998730 UNIT/ML suspension Swish and swallow 10 mLs (1,000,000 Units) in mouth 4 times dailyDisp-400 mL, R-2Local Print      order for DME Equipment being ordered: [employer] ()  Treatment Diagnosis: ESRD on PD  Pt has to be connected to PD all night and can not be disconnected, hence impending his mobility to go to the bathroom. At risk for infection if he does not have this equipment.D isp-1 Units, R-0, Local Print      pantoprazole (PROTONIX) 40 MG EC tablet Take 1 tablet (40 mg) by mouth every morning, Disp-30 tablet, R-11, E-Prescribe      rosuvastatin (CRESTOR) 20 MG tablet Take 1 tablet (20 mg) by mouth daily, Disp-30 tablet, R-1, E-PrescribeReplaces Pravastatin with next fill.      triamcinolone (KENALOG) 0.1 % ointment Apply topically 2 times dailyDisp-80 g, [id number]      Urea 40 % CREA Externally apply topically dailyDisp-85 g, [id number]         STOP taking these medications       apixaban ANTICOAGULANT (ELIQUIS) 2.5 MG tablet Comments:   Reason for Stopping:         cloNIDine (CATAPRES) 0.1 MG tablet Comments:   Reason for Stopping:         mycophenolate (GENERIC EQUIVALENT) 250 MG capsule Comments:   Reason for Stopping:         pravastatin (PRAVACHOL) 40 MG tablet Comments:   Reason for Stopping:               DISCHARGE DISPOSITION: Murray Nicholson will  discharge to TCU in stable condition.     DISCHARGE INSTRUCTIONS:    Discharge Procedure Orders  Medication Therapy Management Referral   Referral Type: Med Therapy Management     Follow Up and recommended labs and tests   Order Comments: [facility]   Phone: [phone]   Fax: [phone]     For resumption of outpatient dialysis Tuesday, Thursday, Saturday.     General info for SNF   Order Comments: Length of Stay Estimate: Short Term Care: Estimated # of Days <30  Condition at Discharge: Improving  Level of care:skilled   Rehabilitation Potential: Good  Admission H&P remains valid and up-to-date: Yes  Recent Chemotherapy: N/A  Use Nursing Home Standing Orders: Yes     Mantoux instructions   Order Comments: Give two-step Mantoux (PPD) Per Facility Policy Yes if not completed previously     Reason for your hospital stay   Order Comments: You were hospitalized for abdominal pain and an active bleed due to rectal and colonic infection.     Glucose monitor nursing POCT   Order Comments: Before meals and at bedtime     Intake and output   Order Comments: Every shift     Daily weights   Order Comments: Call Provider for weight gain of more than 2 pounds per day or 5 pounds per week.     Ostomy care   Order Comments: Standard Cares.  Type:  Colostomy.     Follow Up and recommended labs and tests   Order Comments: CBC and BMP on dialysis days.  Weekly CMV PCR's.   Weekly Tacro levels.  Goal 6-8.     Activity - Up ad edith   Order Specific Question Answer Comments   Is discharge order? Yes      Activity - Ambulate in hallway   Order Comments: Every shift   Order Specific Question Answer Comments   Is discharge order? Yes      Weight bearing status     Full Code     Physical Therapy Adult Consult   Order Comments: Evaluate and treat as clinically indicated.    Reason:  Deconditioning s/p heart transplant     Occupational Therapy Adult Consult   Order Comments: Evaluate and treat as clinically  indicated.    Reason: Continue therapy to help facilitate more IADL's.     Contact Isolation     Advance Diet as Tolerated   Order Comments: Follow this diet upon discharge: Orders Placed This Encounter     Calorie Counts     Snacks/Supplements Adult: Other; Please offer Boost or Nepro with meals.; With Meals     Snacks/Supplements Adult: Boost Plus; Between Meals     Calorie Counts     Regular Diet Adult   Order Specific Question Answer Comments   Is discharge order? Yes        >30 minutes spent in discharge, including >50% in counseling and coordination of care, medication review and plan of care recommended on follow up. Questions were answered to the patient's satisfaction.   Dr. Schuler was contacted at the time of discharge, so as to bridge from hospital to outpatient care.  It was our pleasure to care for Murray during his hospitalization. Please do not hesitate to contact me should there be questions regarding the hospital course or discharge plan.          Kristi MARTIN, CNP  [facility]  9/11/2018  [phone]

## 2018-09-11 NOTE — PROGRESS NOTES
HEMODIALYSIS TREATMENT NOTE    Date: 9/11/2018  Time: 5:33 PM    Data:  Pre Wt: 80 kg (176 lb 5.9 oz)   Desired Wt: 79.5 kg   Post Wt: 79.5 kg (175 lb 4.3 oz)  Weight gain: -0.5 kg   Weight change: 0.5 kg  Ultrafiltration - Post Run Net Total Removed (mL): 500 mL  Ultrafiltration - Post Run Net Total Gain (mL): 0 mL  Vascular Access Status: Yes, secured and visible  Dialyzer Rinse: Streaked  Total Blood Volume Processed: 61.8  Total Dialysis (Treatment) Time:  3.5 hours    Lab: yes    Interventions:Assessment:  Pt tolerated tx well. Epogen, Hectorol. And sodium phosphate given during tx (see MAR). CVC utilized without complication. VSS throughout tx. 500 mL of fluid obtained. CVC lumens heparin locked. Tx shorted 40 minutes early d/t discharge to TCU and transportation schedule. Hand off report given to primary nurse.   Plan:    Per nephrology team.

## 2018-09-11 NOTE — PLAN OF CARE
Problem: Patient Care Overview  Goal: Plan of Care/Patient Progress Review  Outcome: No Change  Pt dcd to TCU. Circulator nurse went over DC paperwork with the pt. IV and tele dcd. [id number] meds given. Belongings bagged up. Taken to TCU via transport.

## 2018-09-11 NOTE — IP AVS SNAPSHOT
MRN:[id number]                      After Visit Summary   9/11/2018    Murray Nicholson    MRN: [id number]           Thank you!     Thank you for choosing [facility] for your care. Our goal is always to provide you with excellent care. Hearing back from our patients is one way we can continue to improve our services. Please take a few minutes to complete the written survey that you may receive in the mail after you visit with us. Thank you!        Patient Information     Date Of Birth          1955        About your hospital stay     You were admitted on:  September 11, 2018 You last received care in the:   Transitional Care    You were discharged on:  September 26, 2018        Reason for your hospital stay       Admitted to TCU for physical rehabilitation following complicated hospitalization. Your TCU stay was complicated by infection of chronic oral ulcer, for which you were treated with IV antibiotics and seen in consultation by ENT.                  Who to Call     For medical emergencies, please call 911.  For non-urgent questions about your medical care, please call your primary care provider or clinic, [phone]          Attending Provider     Provider Specialty    Penny Navarro MD Internal Medicine    Wing Caruso MD Internal Medicine    Roni Maldonado MD Internal Medicine       Primary Care Provider Office Phone # Fax #    Yahir Turcios [phone]      After Care Instructions     Activity       Your activity upon discharge: activity as tolerated            Diet       Follow this diet upon discharge Orders Placed This Encounter      Snacks/Supplements Adult: Boost Plus; Between Meals      Snacks/Supplements Adult: Boost Plus; With Meals      Combination Diet 5091-1700 Calories: Moderate Consistent CHO (4-6 CHO units/meal)                  Follow-up Appointments     Adult [facility] Follow-up and recommended labs and tests       Follow up  with cardiology core clinic within 1 week.  Tacrolimus level to be drawn in clinic on Friday, 9/28/18.     ENT follow up in 1-2 weeks for monitoring of chronic necrotic oral ulceration with possible osteomyelitis.     Transplant ID as directed by Dr. Lawrence (transplant coordinator to follow up and schedule).     Follow up with Dr. Ernst 10/8/18 at 4:00pm and heart cath with biopsy scheduled 10/10/18.    Follow up with Dr. Tran of colorectal surgery 10/15/18 at 9:30am    Appointments on [place] and/or [facility] (with [facility] or [facility] provider or service). Call [phone] if you haven't heard regarding these appointments within 7 days of discharge.            Follow Up and recommended labs and tests       Follow up with primary care provider, Yahir Turcios, within 7 days for hospital follow- up.  The following labs/tests are recommended: CBC w differential, CMP.                  Your next 10 appointments already scheduled     Sep 28, 2018  7:30 AM CDT   LAB with  LAB   [facility] [place])    [address] [place] [address] [id number] [phone]           Please do not eat 10-12 hours before your appointment if you are coming in fasting for labs on lipids, cholesterol, or glucose (sugar). This does not apply to pregnant women. Water, hot tea and black coffee (with nothing added) are okay. Do not drink other fluids, diet soda or chew gum.            Oct 03, 2018  2:00 PM CDT   (Arrive by 1:45 PM)   RETURN HEART TRANSPLANT with Klever Ernst MD   [facility] ([facility])    [address] [place] [address]  [address] [place] [address] [id number] [phone]            Oct 05, 2018  9:00 AM CDT   LAB with  LAB   [facility] Lab ([facility])    [address] [place] [address] [id number] [phone]           Please do not eat 10-12 hours before your appointment if you are coming in  fasting for labs on lipids, cholesterol, or glucose (sugar). This does not apply to pregnant women. Water, hot tea and black coffee (with nothing added) are okay. Do not drink other fluids, diet soda or chew gum.            Oct 10, 2018  1:00 PM CDT   LAB with UU LAB GOLD WAITING   [facility])    [address] [facility] [address]              Please do not eat 10-12 hours before your appointment if you are coming in fasting for labs on lipids, cholesterol, or glucose (sugar). This does not apply to pregnant women. Water, hot tea and black coffee (with nothing added) are okay. Do not drink other fluids, diet soda or chew gum.            Oct 10, 2018  1:30 PM CDT   Procedure - 2.5 hour with U2A ROOM 17   [address] [facility] ([facility])    [address] [facility] [address]               Oct 10, 2018  2:30 PM CDT   Heart Cath Heart Biopsy with UUHCVR5   [facility] ([facility])    [address] [facility] [address]   [phone]            Oct 12, 2018  7:30 AM CDT   LAB with  LAB   [facility] ([facility])    [address]  1st Floor  [facility] [id number] [phone]           Please do not eat 10-12 hours before your appointment if you are coming in fasting for labs on lipids, cholesterol, or glucose (sugar). This does not apply to pregnant women. Water, hot tea and black coffee (with nothing added) are okay. Do not drink other fluids, diet soda or chew gum.            Oct 15, 2018  9:30 AM CDT   (Arrive by 9:15 AM)   New Patient Visit with Rick Tran MD   [facility] Colon and Rectal Surgery ([facility])    [address]  4th Floor  [facility] [id number] [phone]            Oct 19, 2018  9:00 AM CDT   LAB with  LAB   [facility]  Lab ([facility])    [address] [id number] [phone]           Please do not eat 10-12 hours before your appointment if you are coming in fasting for labs on lipids, cholesterol, or glucose (sugar). This does not apply to pregnant women. Water, hot tea and black coffee (with nothing added) are okay. Do not drink other fluids, diet soda or chew gum.            Oct 26, 2018  7:30 AM CDT   LAB with  LAB   [facility] ([facility])    [address] [address] [phone]           Please do not eat 10-12 hours before your appointment if you are coming in fasting for labs on lipids, cholesterol, or glucose (sugar). This does not apply to pregnant women. Water, hot tea and black coffee (with nothing added) are okay. Do not drink other fluids, diet soda or chew gum.              Additional Services     CARDIAC REHAB REFERRAL       Patient is requesting [facility] and they will contact patient to have this set up.     Please be aware that coverage of these services is subject to the terms and limitations of your health insurance plan.  Call member services at your health plan with any benefit or coverage questions.            GASTROENTEROLOGY ADULT REF CONSULT ONLY       Preferred Location: [facility] [phone]      Please be aware that coverage of these services is subject to the terms and limitations of your health insurance plan.  Call member services at your health plan with any benefit or coverage questions.  Any procedures must be performed at a [facility] facility OR coordinated by your clinic's referral office.    Please bring the following with you to your appointment:    (1) Any X-Rays, CTs or MRIs which have been performed.  Contact the facility where they were done to arrange for  prior to your scheduled appointment.    (2) List of current medications   (3) This  referral request   (4) Any documents/labs given to you for this referral                  Pending Results     Date and Time Order Name Status Description    9/25/2018 1518 Tacrolimus level In process             Statement of Approval     Ordered          09/26/18 1501  I have reviewed and agree with all the recommendations and orders detailed in this document.  EFFECTIVE NOW     Approved and electronically signed by:  Eric Chu PA-C             Admission Information     Date & Time Provider Department Dept. Phone    9/11/2018 Roni Maldonado MD  Transitional Care [phone]      Your Vitals Were     Blood Pressure Pulse Temperature Respirations Weight Pulse Oximetry    134/81 (BP Location: Right arm) 91 98.2  F (36.8  C) (Oral) 18 75.3 kg (165 lb 14.4 oz) 97%    BMI (Body Mass Index)                   24.5 kg/m2           MyChart Information     [facility] gives you secure access to your electronic health record. If you see a primary care provider, you can also send messages to your care team and make appointments. If you have questions, please call your primary care clinic.  If you do not have a primary care provider, please call [phone] and they will assist you.        Care EveryWhere ID     This is your Care EveryWhere ID. This could be used by other organizations to access your [facility] medical records  [id number]        Equal Access to Services     JOHN HAUSER : Aubrie bonnero Sobriandaali, waaxda luqadaha, qaybta kaalmada adeegyada, jose ayala. So [facility] [phone].    ATENCIÓN: Si habla español, tiene a ortiz disposición servicios gratuitos de asistencia lingüística. Llame al [phone].    We comply with applicable federal civil rights laws and [place] laws. We do not discriminate on the basis of race, color, national origin, age, disability, sex, sexual orientation, or gender identity.               Review of your medicines      START taking        Dose  / Directions    chlorhexidine 0.12 % solution   Commonly known as:  PERIDEX   Used for:  Oral ulceration        Dose:  15 mL   Swish and spit 15 mLs in mouth 2 times daily   Quantity:  900 mL   Refills:  0       ciprofloxacin 500 MG tablet   Commonly known as:  CIPRO   Indication:  Infection of the Skin and/or Related Soft Tissue, oral ulceration   Used for:  Oral ulceration        Dose:  500 mg   Start taking on:  9/27/2018   Take 1 tablet (500 mg) by mouth every 24 hours   Quantity:  14 tablet   Refills:  0       clotrimazole 10 MG kalpana   Used for:  Candidiasis of mouth        Dose:  1 Kalpana   Place 1 Kalpana (10 mg) inside cheek 4 times daily   Quantity:  40 Kalpana   Refills:  0       * insulin isophane human 100 UNIT/ML injection   Commonly known as:  HumuLIN N PEN   Indication:  Type 2 Diabetes   Used for:  Type 2 diabetes mellitus with diabetic nephropathy, with long-term current use of insulin (H)        Dose:  12 Units   Inject 12 Units Subcutaneous daily (with dinner)   Refills:  0       * insulin isophane human 100 UNIT/ML injection   Commonly known as:  HumuLIN N PEN   Indication:  Type 2 Diabetes   Used for:  Type 2 diabetes mellitus with diabetic nephropathy, with long-term current use of insulin (H)        Dose:  16 Units   Start taking on:  9/27/2018   Inject 16 Units Subcutaneous every morning (before breakfast)   Refills:  0       * Notice:  This list has 2 medication(s) that are the same as other medications prescribed for you. Read the directions carefully, and ask your doctor or other care provider to review them with you.      CONTINUE these medicines which may have CHANGED, or have new prescriptions. If we are uncertain of the size of tablets/capsules you have at home, strength may be listed as something that might have changed.        Dose / Directions    insulin aspart 100 UNIT/ML injection   Commonly known as:  NovoLOG PEN   This may have changed:    - how much to take  - when to take  this  - Another medication with the same name was removed. Continue taking this medication, and follow the directions you see here.   Used for:  Type 2 diabetes mellitus with diabetic nephropathy, with long-term current use of insulin (H)        Dose:  1-7 Units   Inject 1-7 Units Subcutaneous 4 times daily (before meals and nightly)   Quantity:  3 mL   Refills:  0       lisinopril 5 MG tablet   Commonly known as:  PRINIVIL/ZESTRIL   Indication:  High Blood Pressure Disorder   This may have changed:    - medication strength  - how much to take   Used for:  Hypertension goal BP (blood pressure) < 130/80        Dose:  5 mg   Start taking on:  9/27/2018   Take 1 tablet (5 mg) by mouth daily   Quantity:  30 tablet   Refills:  0       pantoprazole 40 MG EC tablet   Commonly known as:  PROTONIX   Indication:  GI prophylaxis   This may have changed:  when to take this   Used for:  Duodenitis        Dose:  40 mg   Take 1 tablet (40 mg) by mouth 2 times daily (before meals)   Quantity:  60 tablet   Refills:  0       pentamidine 300 MG neb solution   Commonly known as:  NEBUPENT   Indication:  PJP prophylaxis post OHT   This may have changed:    - when to take this  - additional instructions   Used for:  Heart replaced by transplant (H)        Dose:  300 mg   Inhale 300 mg into the lungs every 28 days Last given 9/19/18   Quantity:  300 mg   Refills:  0       tacrolimus 1 MG capsule   Commonly known as:  GENERIC EQUIVALENT   This may have changed:  how much to take   Used for:  Heart transplanted (H)        Dose:  1 mg   Take 1 capsule (1 mg) by mouth 2 times daily   Quantity:  60 capsule   Refills:  0         CONTINUE these medicines which have NOT CHANGED        Dose / Directions    albuterol 108 (90 Base) MCG/ACT inhaler   Commonly known as:  PROAIR HFA/PROVENTIL HFA/VENTOLIN HFA        Dose:  2 puff   Inhale 2 puffs into the lungs every 4 hours as needed for shortness of breath / dyspnea or wheezing   Refills:  0        amLODIPine 10 MG tablet   Commonly known as:  NORVASC   Used for:  Hypertension goal BP (blood pressure) < 130/80        Dose:  10 mg   Take 1 tablet (10 mg) by mouth daily   Quantity:  30 tablet   Refills:  0       aspirin 81 MG tablet        Take 1 tablet (81 mg) by mouth at bedtime   Refills:  0       biotin 5 MG Caps   Commonly known as:  BIOTIN 5000   Used for:  Brittle nails        Dose:  5 mg   Take 5 mg by mouth daily   Quantity:  30 capsule   Refills:  3       blood glucose monitoring lancets   Used for:  Type 2 diabetes mellitus with stage 5 chronic kidney disease not on chronic dialysis, without long-term current use of insulin (H)        Use to test blood sugar 2-3 times daily or as directed.  Ok to substitute alternative if insurance prefers.   Quantity:  102 each   Refills:  11       blood glucose monitoring test strip   Commonly known as:  no brand specified   Used for:  Type 2 diabetes mellitus with stage 5 chronic kidney disease not on chronic dialysis, without long-term current use of insulin (H)        Use to test blood sugar 2-3 times daily or as directed.   Quantity:  100 each   Refills:  11       fluticasone 50 MCG/ACT spray   Commonly known as:  FLONASE   Used for:  Nasal congestion        Dose:  1-2 spray   Spray 1-2 sprays into both nostrils daily   Quantity:  16 g   Refills:  11       glucagon 1 MG Solr injection   Used for:  Hypoglycemia        Dose:  1 mg   Inject 1 mg Subcutaneous every 15 minutes as needed for low blood sugar (May repeat x 1 only)   Refills:  0       glucose 40 % (400 mg/mL) Gel gel   Used for:  Hypoglycemia        Dose:  15-30 g   Take 15-30 g by mouth every 15 minutes as needed for low blood sugar   Refills:  0       hydrALAZINE 100 MG Tabs tablet   Commonly known as:  APRESOLINE   Used for:  Essential hypertension        Dose:  100 mg   Take 1 tablet (100 mg) by mouth every 8 hours   Quantity:  60 tablet   Refills:  0       loratadine 10 MG tablet   Commonly known  as:  CLARITIN   Used for:  Hypertensive cardiopathy, SOB (shortness of breath)        Dose:  10 mg   Take 10 mg by mouth daily Reported on 5/3/2017   Refills:  0       melatonin 1 MG Tabs tablet   Used for:  Heart transplanted (H)        Dose:  2 mg   Take 2 tablets (2 mg) by mouth nightly as needed for sleep   Quantity:  120 tablet   Refills:  1       NEPHROCAPS 1 MG capsule        Dose:  1 capsule   Take 1 capsule by mouth daily   Quantity:  120 capsule   Refills:  0       nystatin 620379 UNIT/ML suspension   Commonly known as:  MYCOSTATIN   Used for:  Heart transplant recipient (H)        Dose:  4304747 Units   Swish and swallow 10 mLs (1,000,000 Units) in mouth 4 times daily   Quantity:  400 mL   Refills:  0       order for DME   Used for:  CKD (chronic kidney disease) stage 5, GFR less than 15 ml/min (H)        Equipment being ordered: Commode () Treatment Diagnosis: ESRD on PD Pt has to be connected to PD all night and can not be disconnected, hence impending his mobility to go to the bathroom. At risk for infection if he does not have this equipment.   Quantity:  1 Units   Refills:  0       polyethylene glycol Packet   Commonly known as:  MIRALAX/GLYCOLAX   Used for:  Constipation, unspecified constipation type        Dose:  17 g   Take 17 g by mouth daily as needed for constipation   Quantity:  7 packet   Refills:  0       predniSONE 5 MG tablet   Commonly known as:  DELTASONE   Indication:  Immunosupressant/S/p heart transplant   Used for:  Heart transplanted (H)        Dose:  5 mg   Take 1 tablet (5 mg) by mouth 2 times daily (with meals)   Quantity:  60 tablet   Refills:  0       rosuvastatin 20 MG tablet   Commonly known as:  CRESTOR   Used for:  Heart transplant recipient (H)        Dose:  20 mg   Take 1 tablet (20 mg) by mouth daily   Quantity:  30 tablet   Refills:  1       simethicone 80 MG chewable tablet   Commonly known as:  MYLICON   Used for:  Flatulence, eructation and gas pain         Dose:  80 mg   Take 1 tablet (80 mg) by mouth every 6 hours as needed for cramping   Quantity:  180 tablet   Refills:  0       traMADol 50 MG tablet   Commonly known as:  ULTRAM   Used for:  Moderate pain        Dose:  50 mg   Take 1 tablet (50 mg) by mouth every 12 hours as needed for moderate pain   Quantity:  10 tablet   Refills:  0       triamcinolone 0.1 % ointment   Commonly known as:  KENALOG   Used for:  Xerosis of skin        Apply topically 2 times daily   Quantity:  80 g   Refills:  0       Urea 40 % Crea   Used for:  Brittle nails        Externally apply topically daily   Quantity:  85 g   Refills:  1         STOP taking     acetaminophen 325 MG tablet   Commonly known as:  TYLENOL           insulin glargine 100 UNIT/ML injection   Commonly known as:  LANTUS           sodium chloride (PF) 0.9% PF flush                Where to get your medicines      These medications were sent to [facility] [address]  [address] [address]     Phone:  [phone]     amLODIPine 10 MG tablet    chlorhexidine 0.12 % solution    ciprofloxacin 500 MG tablet    clotrimazole 10 MG josefina    lisinopril 5 MG tablet    nystatin 779925 UNIT/ML suspension    pantoprazole 40 MG EC tablet    predniSONE 5 MG tablet    tacrolimus 1 MG capsule         Some of these will need a paper prescription and others can be bought over the counter. Ask your nurse if you have questions.     Bring a paper prescription for each of these medications     insulin aspart 100 UNIT/ML injection       You don't need a prescription for these medications     insulin isophane human 100 UNIT/ML injection    insulin isophane human 100 UNIT/ML injection    pentamidine 300 MG neb solution                Protect others around you: Learn how to safely use, store and throw away your medicines at www.disposemymeds.org.        ANTIBIOTIC INSTRUCTION     You've Been Prescribed an Antibiotic - Now  What?  Your healthcare team thinks that you or your loved one might have an infection. Some infections can be treated with antibiotics, which are powerful, life-saving drugs. Like all medications, antibiotics have side effects and should only be used when necessary. There are some important things you should know about your antibiotic treatment.      Your healthcare team may run tests before you start taking an antibiotic.    Your team may take samples (e.g., from your blood, urine or other areas) to run tests to look for bacteria. These test can be important to determine if you need an antibiotic at all and, if you do, which antibiotic will work best.      Within a few days, your healthcare team might change or even stop your antibiotic.    Your team may start you on an antibiotic while they are working to find out what is making you sick.    Your team might change your antibiotic because test results show that a different antibiotic would be better to treat your infection.    In some cases, once your team has more information, they learn that you do not need an antibiotic at all. They may find out that you don't have an infection, or that the antibiotic you're taking won't work against your infection. For example, an infection caused by a virus can't be treated with antibiotics. Staying on an antibiotic when you don't need it is more likely to be harmful than helpful.      You may experience side effects from your antibiotic.    Like all medications, antibiotics have side effects. Some of these can be serious.    Let you healthcare team know if you have any known allergies when you are admitted to the hospital.    One significant side effect of nearly all antibiotics is the risk of severe and sometimes deadly diarrhea caused by Clostridium difficile (C. Difficile). This occurs when a person takes antibiotics because some good germs are destroyed. Antibiotic use allows C. diificile to take over, putting patients at  high risk for this serious infection.    As a patient or caregiver, it is important to understand your or your loved one's antibiotic treatment. It is especially important for caregivers to speak up when patients can't speak for themselves. Here are some important questions to ask your healthcare team.    What infection is this antibiotic treating and how do you know I have that infection?    What side effects might occur from this antibiotic?    How long will I need to take this antibiotic?    Is it safe to take this antibiotic with other medications or supplements (e.g., vitamins) that I am taking?     Are there any special directions I need to know about taking this antibiotic? For example, should I take it with food?    How will I be monitored to know whether my infection is responding to the antibiotic?    What tests may help to make sure the right antibiotic is prescribed for me?      Information provided by:  www.cdc.gov/getsmart  U.S. Department of Health and Human Services  Centers for disease Control and Prevention  National Center for Emerging and Zoonotic Infectious Diseases  Division of Healthcare Quality Promotion             Medication List: This is a list of all your medications and when to take them. Check marks below indicate your daily home schedule. Keep this list as a reference.      Medications           Morning Afternoon Evening Bedtime As Needed    albuterol 108 (90 Base) MCG/ACT inhaler   Commonly known as:  PROAIR HFA/PROVENTIL HFA/VENTOLIN HFA   Inhale 2 puffs into the lungs every 4 hours as needed for shortness of breath / dyspnea or wheezing                                amLODIPine 10 MG tablet   Commonly known as:  NORVASC   Take 1 tablet (10 mg) by mouth daily   Last time this was given:  10 mg on 9/26/2018  8:33 AM                                aspirin 81 MG tablet   Take 1 tablet (81 mg) by mouth at bedtime                                biotin 5 MG Caps   Commonly known as:   BIOTIN 5000   Take 5 mg by mouth daily                                blood glucose monitoring lancets   Use to test blood sugar 2-3 times daily or as directed.  Ok to substitute alternative if insurance prefers.                                blood glucose monitoring test strip   Commonly known as:  no brand specified   Use to test blood sugar 2-3 times daily or as directed.                                chlorhexidine 0.12 % solution   Commonly known as:  PERIDEX   Swish and spit 15 mLs in mouth 2 times daily   Last time this was given:  15 mLs on 9/26/2018  8:34 AM                                ciprofloxacin 500 MG tablet   Commonly known as:  CIPRO   Take 1 tablet (500 mg) by mouth every 24 hours   Start taking on:  9/27/2018   Last time this was given:  500 mg on 9/26/2018  8:33 AM                                clotrimazole 10 MG kalpana   Place 1 Kalpana (10 mg) inside cheek 4 times daily   Last time this was given:  1 Kalpana on 9/24/2018  9:29 PM                                fluticasone 50 MCG/ACT spray   Commonly known as:  FLONASE   Spray 1-2 sprays into both nostrils daily                                glucagon 1 MG Solr injection   Inject 1 mg Subcutaneous every 15 minutes as needed for low blood sugar (May repeat x 1 only)                                glucose 40 % (400 mg/mL) Gel gel   Take 15-30 g by mouth every 15 minutes as needed for low blood sugar                                hydrALAZINE 100 MG Tabs tablet   Commonly known as:  APRESOLINE   Take 1 tablet (100 mg) by mouth every 8 hours   Last time this was given:  100 mg on 9/26/2018  2:24 PM                                insulin aspart 100 UNIT/ML injection   Commonly known as:  NovoLOG PEN   Inject 1-7 Units Subcutaneous 4 times daily (before meals and nightly)   Last time this was given:  1 Units on 9/25/2018  9:47 PM                                * insulin isophane human 100 UNIT/ML injection   Commonly known as:  HumuLIN N PEN    Inject 12 Units Subcutaneous daily (with dinner)   Last time this was given:  16 Units on 9/26/2018  8:35 AM                                * insulin isophane human 100 UNIT/ML injection   Commonly known as:  HumuLIN N PEN   Inject 16 Units Subcutaneous every morning (before breakfast)   Start taking on:  9/27/2018   Last time this was given:  16 Units on 9/26/2018  8:35 AM                                lisinopril 5 MG tablet   Commonly known as:  PRINIVIL/ZESTRIL   Take 1 tablet (5 mg) by mouth daily   Start taking on:  9/27/2018   Last time this was given:  5 mg on 9/26/2018  8:33 AM                                loratadine 10 MG tablet   Commonly known as:  CLARITIN   Take 10 mg by mouth daily Reported on 5/3/2017                                melatonin 1 MG Tabs tablet   Take 2 tablets (2 mg) by mouth nightly as needed for sleep                                NEPHROCAPS 1 MG capsule   Take 1 capsule by mouth daily   Last time this was given:  1 capsule on 9/26/2018  8:33 AM                                nystatin [id number] UNIT/ML suspension   Commonly known as:  MYCOSTATIN   Swish and swallow 10 mLs (1,000,000 Units) in mouth 4 times daily   Last time this was given:  1,000,000 Units on 9/26/2018  2:24 PM                                order for DME   Equipment being ordered: Carol () Treatment Diagnosis: ESRD on PD Pt has to be connected to PD all night and can not be disconnected, hence impending his mobility to go to the bathroom. At risk for infection if he does not have this equipment.                                pantoprazole 40 MG EC tablet   Commonly known as:  PROTONIX   Take 1 tablet (40 mg) by mouth 2 times daily (before meals)   Last time this was given:  40 mg on 9/26/2018  8:34 AM                                pentamidine 300 MG neb solution   Commonly known as:  NEBUPENT   Inhale 300 mg into the lungs every 28 days Last given 9/19/18   Last time this was given:  300 mg on 9/19/2018   5:23 PM                                polyethylene glycol Packet   Commonly known as:  MIRALAX/GLYCOLAX   Take 17 g by mouth daily as needed for constipation                                predniSONE 5 MG tablet   Commonly known as:  DELTASONE   Take 1 tablet (5 mg) by mouth 2 times daily (with meals)   Last time this was given:  5 mg on 9/26/2018  8:34 AM                                rosuvastatin 20 MG tablet   Commonly known as:  CRESTOR   Take 1 tablet (20 mg) by mouth daily                                simethicone 80 MG chewable tablet   Commonly known as:  MYLICON   Take 1 tablet (80 mg) by mouth every 6 hours as needed for cramping                                tacrolimus 1 MG capsule   Commonly known as:  GENERIC EQUIVALENT   Take 1 capsule (1 mg) by mouth 2 times daily   Last time this was given:  1 mg on 9/26/2018 11:50 AM                                traMADol 50 MG tablet   Commonly known as:  ULTRAM   Take 1 tablet (50 mg) by mouth every 12 hours as needed for moderate pain                                triamcinolone 0.1 % ointment   Commonly known as:  KENALOG   Apply topically 2 times daily                                Urea 40 % Crea   Externally apply topically daily                                * Notice:  This list has 2 medication(s) that are the same as other medications prescribed for you. Read the directions carefully, and ask your doctor or other care provider to review them with you.

## 2018-09-11 NOTE — PLAN OF CARE
Problem: Patient Care Overview  Goal: Plan of Care/Patient Progress Review    D: Pt with history of OHT 6/14/18, admitted with colitis, anorectal and pericolonic abscess s/p laproscopic sm.bowel resection and colostomy on 8/12. Pt has ESRD on HD.  I: Scheduled meds as ordered. Held morning dose of Norvasc due to dialysis run planned for today and labile BP.  A: A/Ox4, AVSS on RA. Pleasant and cooperative. LS clear, dim. Colostomy WDL, small amount of soft brown stool, denies nausea or abdominal pain. Up independently in room.  P: Continue with plan of care. Notify cards 2 with concerns. Encourage activity. Discharge to TCU, ride at 5:30PM tonight.    Bedside Echo, Right neck US, PT session, and dialysis run today.

## 2018-09-11 NOTE — IP AVS SNAPSHOT
` `     TR TRANSITIONAL CARE: [phone]            Medication Administration Report for Murray Nicholson as of 09/13/18 0913   Legend:    Given Hold Not Given Due Canceled Entry Other Actions    Time Time (Time) Time  Time-Action       Inactive    Active    Linked        Medications 09/07/18 09/08/18 09/09/18 09/10/18 09/11/18 09/12/18 09/13/18    acetaminophen (TYLENOL) Suppository 650 mg  Dose: 650 mg  Freq: EVERY 4 HOURS PRN Route: RE  PRN Reasons: mild pain,fever  PRN Comment: greater than 101 degrees.  Start: 09/11/18 1819   Admin Instructions: Maximum acetaminophen dose from all sources = 75 mg/kg/day not to exceed 4 grams/day.    Admin. Amount: 1 suppository (1 × 650 mg suppository)  Dispense Loc: [facility]               acetaminophen (TYLENOL) tablet 650 mg  Dose: 650 mg  Freq: EVERY 4 HOURS PRN Route: PO  PRN Reason: mild pain  PRN Comment: multimodal surgical pain management along with NSAIDS and opioid medication as indicated based on pain control and physical function.  Start: 09/11/18 1819   Admin Instructions: Maximum acetaminophen dose from all sources = 75 mg/kg/day not to exceed 4 grams/day.    Admin. Amount: 2 tablet (2 × 325 mg tablet)  Dispense Loc: [facility]               albuterol (PROAIR HFA/PROVENTIL HFA/VENTOLIN HFA) 108 (90 Base) MCG/ACT inhaler 2 puff  Dose: 2 puff  Freq: EVERY 4 HOURS PRN Route: IN  PRN Reasons: shortness of breath / dyspnea,wheezing  Start: 09/11/18 1819   Admin. Amount: 2 puff  Dispense Loc: [facility]               alum & mag hydroxide-simethicone (MYLANTA ES/MAALOX  ES) suspension 15 mL  Dose: 15 mL  Freq: EVERY 4 HOURS PRN Route: PO  PRN Reason: other  PRN Comment: gastric distress.  Start: 09/11/18 1819   Admin Instructions: Max: 4 doses in 24 hours    Admin. Amount: 15 mL  Dispense Loc: [facility]  Volume: 30 mL               amLODIPine (NORVASC) tablet 10 mg  Dose: 10 mg  Freq: DAILY Route:  PO  Indications of Use: HYPERTENSION  Start: 09/12/18 0800   Admin Instructions: Hold for systolic < 120    Admin. Amount: 2 tablet (2 × 5 mg tablet)  Last Admin: 09/13/18 0806  Dispense Loc: [facility]          0811 (10 mg)-Given        0806 (10 mg)-Given           aspirin EC tablet 81 mg  Dose: 81 mg  Freq: DAILY Route: PO  Indications Comment: CAV Prophylaxis  Start: 09/12/18 0800   Admin. Amount: 1 tablet (1 × 81 mg tablet)  Last Admin: 09/13/18 0806  Dispense Loc: [facility]          0812 (81 mg)-Given        0806 (81 mg)-Given           atorvastatin (LIPITOR) tablet 20 mg  Dose: 20 mg  Freq: EVERY EVENING Route: PO  Indications of Use: HYPERLIPIDEMIA  Start: 09/11/18 2100   Admin. Amount: 1 tablet (1 × 20 mg tablet)  Last Admin: 09/12/18 2105  Dispense Loc: [facility]         2110 (20 mg)-Given        2105 (20 mg)-Given        [ ] 2100           benzocaine-menthol (CEPACOL) 15-3.6 MG lozenge 1 lozenge  Dose: 1 lozenge  Freq: EVERY 2 HOURS PRN Route: BU  PRN Reason: sore throat  PRN Comment: discomfort  Start: 09/11/18 1819   Admin. Amount: 1 lozenge  Dispense Loc: [facility]               eucerin cream  Freq: EVERY 1 HOUR PRN Route: Top  PRN Reason: dry skin  Start: 09/11/18 1819   Admin Instructions: Apply as clinically indicated to affected areas Q1h PRN.    Dispense Loc: Non Rx Stock               glucose gel 15-30 g  Dose: 15-30 g  Freq: EVERY 15 MIN PRN Route: PO  PRN Reason: low blood sugar  Start: 09/11/18 1819   Admin Instructions: Give 15 g for BG 51 to 69 mg/dL IF patient is conscious and able to swallow. Give 30 g for BG less than or equal to 50 mg/dL IF patient is conscious and able to swallow. Do NOT give glucose gel via enteral tube.  IF patient has enteral tube: give apple juice 120 mL (4 oz or 15 g of CHO) via enteral tube for BG 51 to 69 mg/dL.  Give apple juice 240 mL (8 oz or 30 g of CHO) via enteral tube for BG less  than or equal to 50 mg/dL.    ~Oral gel is preferable for conscious and able to swallow patient.   ~IF gel unavailable or patient refuses may provide apple juice 120 mL (4 oz or 15 g of CHO). Document juice on I and O flowsheet.    Admin. Amount: 15-30 g  Dispense Loc: [facility]  Volume: 93.75 mL              Or  dextrose 50 % injection 25-50 mL  Dose: 25-50 mL  Freq: EVERY 15 MIN PRN Route: IV  PRN Reason: low blood sugar  Start: 09/11/18 1819   Admin Instructions: Use if have IV access, BG less than 70 mg/dL and meet dose criteria below:  Dose if conscious and alert (or disorientated) and NPO = 25 mL  Dose if unconscious / not alert = 50 mL  Vesicant. For ordered doses up to 25 g, give IV Push undiluted. Give each 5g over 1 minute.    Admin. Amount: 25-50 mL  Dispense Loc: [facility]  Infused Over: 1-5 Minutes  Volume: 50 mL              Or  glucagon injection 1 mg  Dose: 1 mg  Freq: EVERY 15 MIN PRN Route: SC  PRN Reason: low blood sugar  PRN Comment: May repeat x 1 only  Start: 09/11/18 1819   Admin Instructions: May give SQ or IM. ONLY use glucagon IF patient has NO IV access AND is UNABLE to swallow AND blood glucose is LESS than or EQUAL to 50 mg/dL.  If ordered IV, give IV Push over 1 minute. Reconstitute with 1mL sterile water.    Admin. Amount: 1 mg  Dispense Loc: [facility]               hydrALAZINE (APRESOLINE) tablet 100 mg  Dose: 100 mg  Freq: EVERY 8 HOURS SCHEDULED Route: PO  Indications of Use: HYPERTENSION  Start: 09/11/18 2200   Admin Instructions: Hold for systolic < 120    Admin. Amount: 2 tablet (2 × 50 mg tablet)  Last Admin: 09/13/18 0640  Dispense Loc: [facility]         2110 (100 mg)-Given        0634 (100 mg)-Given       1437 (100 mg)-Given       2105 (100 mg)-Given        0640 (100 mg)-Given       [ ] 1400       [ ] 2200           insulin aspart (NovoLOG) inj (RAPID ACTING)  Dose: 1-5 Units  Freq: AT BEDTIME  Route: SC  Start: 09/11/18 2200   Admin Instructions: MEDIUM INSULIN RESISTANCE DOSING    Do Not give Bedtime Correction Insulin if BG less than  200.   For  - 249 give 1 units.   For  - 299 give 2 units.   For  - 349 give 3 units.   For  -399 give 4 units.   For BG greater than or equal to 400 give 5 units.  Notify provider if glucose greater than or equal to 350 mg/dL after administration of correction dose.  If given at mealtime, must be administered 5 min before meal or immediately after.    Admin. Amount: 1-5 Units  Dispense Loc: Contact Rx for dose  Volume: 3 mL         (2333)-Not Given        (2149)-Not Given [C]        [ ] 2200           insulin aspart (NovoLOG) inj (RAPID ACTING)  Dose: 1-7 Units  Freq: 3 TIMES DAILY BEFORE MEALS Route: SC  Start: 09/11/18 1830   Admin Instructions: Correction Scale - MEDIUM INSULIN RESISTANCE DOSING     Do Not give Correction Insulin if Pre-Meal BG less than 140.   For Pre-Meal  - 189 give 1 unit.   For Pre-Meal  - 239 give 2 units.   For Pre-Meal  - 289 give 3 units.   For Pre-Meal  - 339 give 4 units.   For Pre-Meal - 399 give 5 units.   For Pre-Meal -449 give 6 units  For Pre-Meal BG greater than or equal to 450 give 7 units.   To be given with prandial insulin, and based on pre-meal blood glucose.    Notify provider if glucose greater than or equal to 350 mg/dL after administration of correction dose.  If given at mealtime, must be administered 5 min before meal or immediately after.    Admin. Amount: 1-7 Units  Last Admin: 09/12/18 1838  Dispense Loc: [facility]  Volume: 3 mL         1942 (1 Units)-Given        (0813)-Not Given       (1229)-Not Given       1838 (1 Units)-Given        (0808)-Not Given       [ ] 1200       [ ] 1700           insulin isophane human (HumuLIN N PEN) injection 15 Units  Dose: 15 Units  Freq: EVERY MORNING BEFORE BREAKFAST Route: SC  Indications of Use: TYPE 2 DIABETES  MELLITUS  Start: 09/13/18 0730   Admin. Amount: 15 Units  Last Admin: 09/13/18 0807  Dispense Loc: [facility]  Volume: 3 mL           0807 (15 Units)-Given           lisinopril (PRINIVIL/Zestril) tablet 5 mg  Dose: 5 mg  Freq: DAILY Route: PO  Indications of Use: HYPERTENSION  Start: 09/13/18 0800   Admin Instructions: Hold for SBP<110    Admin. Amount: 2 tablet (2 × 2.5 mg tablet)  Last Admin: 09/13/18 0805  Dispense Loc: [facility]           0805 (5 mg)-Given           medication instructions  Freq: CONTINUOUS PRN Route: XX  Start: 09/11/18 1819   Admin Instructions: Routine PRN Nurse may request from Pharmacy a change of form of medication (e.g. Liquid to tablet).      Dispense Loc: [facility]               naloxone (NARCAN) injection 0.1-0.4 mg  Dose: 0.1-0.4 mg  Freq: EVERY 2 MIN PRN Route: IV  PRN Reason: opioid reversal  Start: 09/11/18 1842   Admin Instructions: For respiratory rate LESS than or EQUAL to 8.  Partial reversal dose:  0.1 mg titrated q 2 minutes for Analgesia Side Effects Monitoring Sedation Level of 3 (frequently drowsy, arousable, drifts to sleep during conversation).Full reversal dose:  0.4 mg bolus for Analgesia Side Effects Monitoring Sedation Level of 4 (somnolent, minimal or no response to stimulation).  For ordered IV doses 0.1-2mg give IVP. Give each 0.4mg over 15 seconds in emergency situations. For non-emergent situations further dilute in 9mL of NS to facilitate titration of response.    Admin. Amount: 0.1-0.4 mg = 0.25-1 mL Conc: 0.4 mg/mL  Dispense Loc: [facility]  Volume: 1 mL               NEPHROCAPS capsule 1 capsule  Dose: 1 capsule  Freq: DAILY Route: PO  Indications of Use: VITAMIN DEFICIENCY  Indications Comment: ESRD on Hemodialysis  Start: 09/12/18 0800   Admin. Amount: 1 capsule  Last Admin: 09/13/18 0805  Dispense Loc: [facility]          0812 (1 capsule)-Given        0805 (1  capsule)-Given           nystatin (MYCOSTATIN) suspension 1,000,000 Units  Dose: 1,000,000 Units  Freq: 4 TIMES DAILY Route: SWISH & SWAL  Indications of Use: OROPHARYNGEAL CANDIDIASIS  Start: 09/11/18 2100   Admin Instructions: Shake well    Admin. Amount: 1,000,000 Units = 10 mL Conc: 100,000 Units/mL  Last Admin: 09/13/18 0806  Dispense Loc: [facility]  Volume: 10 mL         2110 (1,000,000 Units)-Given        0812 (1,000,000 Units)-Given       1229 (1,000,000 Units)-Given       1811 (1,000,000 Units)-Given       2105 (1,000,000 Units)-Given        0806 (1,000,000 Units)-Given       [ ] 1300       [ ] 1700       [ ] 2100           ondansetron (ZOFRAN-ODT) ODT tab 4 mg  Dose: 4 mg  Freq: EVERY 6 HOURS PRN Route: PO  PRN Reasons: nausea,vomiting  Start: 09/11/18 1819   Admin Instructions: This is Step 1 of nausea and vomiting management.  If nausea not resolved in 15 minutes, go to Step 2 prochlorperazine (COMPAZINE). Do not push through foil backing. Peel back foil and gently remove. Place on tongue immediately. Administration with liquid unnecessary  With dry hands, peel back foil backing and gently remove tablet; do not push oral disintegrating tablet through foil backing; administer immediately on tongue and oral disintegrating tablet dissolves in seconds; then swallow with saliva; liquid not required.    Admin. Amount: 1 tablet (1 × 4 mg tablet)  Dispense Loc: [facility]              Or  ondansetron (ZOFRAN) injection 4 mg  Dose: 4 mg  Freq: EVERY 6 HOURS PRN Route: IV  PRN Reasons: nausea,vomiting  Start: 09/11/18 1819   Admin Instructions: This is Step 1 of nausea and vomiting management.  If nausea not resolved in 15 minutes, go to Step 2 prochlorperazine (COMPAZINE).  Irritant. For ordered doses up to 4 mg, give IV Push undiluted over 2-5 minutes.    Admin. Amount: 4 mg = 2 mL Conc: 4 mg/2 mL  Dispense Loc: [facility]  Infused Over: 2-5  Minutes  Volume: 2 mL               pantoprazole (PROTONIX) EC tablet 40 mg  Dose: 40 mg  Freq: EVERY MORNING Route: PO  Indications Comment: GI prophylaxis  Start: 09/12/18 0900   Admin Instructions: DO NOT CRUSH.    Admin. Amount: 1 tablet (1 × 40 mg tablet)  Last Admin: 09/13/18 0806  Dispense Loc: [facility]          0812 (40 mg)-Given        0806 (40 mg)-Given           predniSONE (DELTASONE) tablet 5 mg  Dose: 5 mg  Freq: 2 TIMES DAILY WITH MEALS Route: PO  Indications Comment: Immunosupressant/S/p heart transplant  Start: 09/11/18 1830   Admin. Amount: 1 tablet (1 × 5 mg tablet)  Last Admin: 09/13/18 0806  Dispense Loc: [facility]         1942 (5 mg)-Given        0812 (5 mg)-Given       1811 (5 mg)-Given        0806 (5 mg)-Given       [ ] 1800           sennosides (SENOKOT) tablet 1-2 tablet  Dose: 1-2 tablet  Freq: 2 TIMES DAILY PRN Route: PO  PRN Reason: constipation  PRN Comment: bowel management.  Start: 09/11/18 1819   Admin Instructions: Start with one tablet, if condition remains, may increase to 2 tablets.  Hold for loose stools.    Admin. Amount: 1-2 tablet  Dispense Loc: [facility]               tacrolimus (GENERIC EQUIVALENT) capsule 2 mg  Dose: 2 mg  Freq: 2 TIMES DAILY Route: PO  Indications Comment: Immunosupressant/S/p heart transplant  Start: 09/12/18 0900   Admin Instructions: Check if BLOOD LEVEL is needed BEFORE administering dose.  This order specifically allows the use of a generic equivalent of tacrolimus (PROGRAF) capsules.    Admin. Amount: 2 capsule (2 × 1 mg capsule)  Last Admin: 09/13/18 0806  Dispense Loc: [facility]          0812 (2 mg)-Given       2105 (2 mg)-Given        0806 (2 mg)-Given       [ ] 2100           traMADol (ULTRAM) half-tab 25-50 mg  Dose: 25-50 mg  Freq: EVERY 12 HOURS PRN Route: PO  PRN Reason: moderate pain  Start: 09/11/18 1819   Admin. Amount: 1-2 half-tab (1-2 × 25 mg  half-tab)  Dispense Loc: [facility]               tuberculin injection 5 Units  Dose: 5 Units  Freq: EVERY 21 DAYS Route: ID  Start: 18   End: 10/24/18 0859   Admin Instructions: For tuberculosis diagnostic without controls.  Read skin test in 48 to 72 hours.  Adjust reading time if needed based on time of administration.   Repeat Mantoux test 3 weeks after the initial test if result is negative.  If test is positive, RN charting the positive result should discontinue this order.    Admin. Amount: 5 Units = 0.1 mL Conc: 5 Units/0.1 mL  Last Admin: 18  Dispense Loc: [facility]  Administrations Remainin  Volume: 0.1 mL          0813 (5 Units)-Given           Future Medications  Medications 09/07/18 09/08/18 09/09/18 09/10/18 09/11/18 09/12/18 09/13/18       - Skin Test Reading -  Freq: EVERY 21 DAYS Route: XX  Start: 18   End: 10/26/18 0859   Admin Instructions: For tuberculosis diagnostic:  Adjust reading time if needed based on time of administration.  Reading must occur between 48-72 hours after administration.    Dispense Loc: [facility]  Administrations Remainin               insulin isophane human (HumuLIN N PEN) injection 8 Units  Dose: 8 Units  Freq: DAILY WITH SUPPER Route: SC  Indications of Use: TYPE 2 DIABETES MELLITUS  Start: 18 1700   Admin. Amount: 8 Units  Dispense Loc: Contact Rx for dose  Volume: 3 mL           [ ] 1700          Discontinued Medications  Medications 09/07/18 09/08/18 09/09/18 09/10/18 09/11/18 09/12/18 09/13/18         Dose: 650 mg  Freq: EVERY 4 HOURS PRN Route: PO  PRN Reasons: mild pain,fever  PRN Comment: greater than 101 degrees  Start: 18   End: 18   Admin Instructions: Maximum acetaminophen dose from all sources = 75 mg/kg/day not to exceed 4 grams/day.    Admin. Amount: 2 tablet (2 × 325 mg tablet)         1843-Med Discontinued           Dose: 2.5 mg  Freq: 2 TIMES  DAILY Route: PO  Start: 09/12/18 0932   End: 09/12/18 1443   Admin. Amount: 1 tablet (1 × 2.5 mg tablet)  Last Admin: 09/12/18 1228  Dispense Loc: [facility]                 1228 (2.5 mg)-Given       1443-Med Discontinued          Dose: 16 Units  Freq: EVERY MORNING BEFORE BREAKFAST Route: SC  Indications of Use: TYPE 2 DIABETES MELLITUS  Start: 09/12/18 0730   End: 09/12/18 1453   Admin. Amount: 16 Units  Last Admin: 09/12/18 0810  Dispense Loc: [facility]  Volume: 3 mL          0810 (16 Units)-Given       1453-Med Discontinued

## 2018-09-11 NOTE — PLAN OF CARE
Problem: Patient Care Overview  Goal: Plan of Care/Patient Progress Review  Outcome: No Change  D:Patient admitted for rectal abscess and colitis.  PMH of NICM S/P OHT 6/14/18, RIJ thromus, ESRD on hemodialysis TTS and DM II.  I/A: /106, schedule hydralazine 100mg given, recheck BP was 147/89.  On Room air, SR/SR c/o abdominal pain and took PRN 50mg Tramadol one dose.  1-unit novolog for HS given for HJ=728.  Patient did sleep btw cares and he is scheduled for dialysis today.  Colostomy is intact with 200cc formed stool green/brownish color and voided 50cc of brian urine.  P:  Will continue with cares and monitor WBC and transition to TCU soon.

## 2018-09-11 NOTE — IP AVS SNAPSHOT
[address] [address]    Phone:  [phone]                                       After Visit Summary   9/11/2018    Murray Nicholson    MRN: [id number]           After Visit Summary Signature Page     I have received my discharge instructions, and my questions have been answered. I have discussed any challenges I see with this plan with the nurse or doctor.    ..........................................................................................................................................  Patient/Patient Representative Signature      ..........................................................................................................................................  Patient Representative Print Name and Relationship to Patient    ..................................................               ................................................  Date                                   Time    ..........................................................................................................................................  Reviewed by Signature/Title    ...................................................              ..............................................  Date                                               Time          22EPIC Rev 08/18

## 2018-09-11 NOTE — PROGRESS NOTES
Patient Name:  Murray Nicholson     Anticipated Discharge Date:  9/11/18    Discharge Disposition:   TCU:  [facility]    Transportation Needs: wheelchair Today at 5:30 PM.   Name of Transportation Company and Phone: [employer] [phone]     Pre-Admission Screening (PAS) online form has been completed.  The Level of Care (LOC) is:  Determined  Confirmation Code is:  [id number]  Patient/caregiver informed of referral to [facility] Line for Pre-Admission Screening for skilled nursing facility (SNF) placement and to expect a phone call post discharge from SNF.     Additional Services/Equipment Arranged:  Dialysis arranged by RN CC. SW provided $50 in gas cards to assist his friend & neighbor to help transport him from TCU to dialysis.      Persons notified of above discharge plan:  Dawn Gao 2, bedside RN    Patient / Family response to discharge plan:  Murray was hopeful that he'd be able to go straight home. But he's agreeable to go to rehab.      Education provided by [facility] at discharge: role of transplant  in out patient setting and provided contact info for     Patient and family discharge goal: be able to do the stairs so he can go home independent.   Provided Education on discharge plan: YES  Patient agreeable to discharge plan:  YES  A list of Medicare Certified Facilities was provided to the patient and/or family to encourage patient choice. Patient's choices for facility are: [facility] only choice as new heart transplant patient.   Will NH provide Skilled rehabilitation or complex medical:  YES  General information regarding anticipated insurance coverage and possible out of pocket cost was discussed. Patient and patient's family are aware patient may incur the cost of transportation to the facility, pending insurance payment: YES  Barriers to discharge: none at this time.     CTS Handoff completed:  YES -verbal report given    Medicare Notice of Rights provided to the patient/family:   YES

## 2018-09-12 LAB
GLUCOSE BLDC GLUCOMTR-MCNC: 145 MG/DL (ref 70–99)
GLUCOSE BLDC GLUCOMTR-MCNC: 154 MG/DL (ref 70–99)
GLUCOSE BLDC GLUCOMTR-MCNC: 180 MG/DL (ref 70–99)
GLUCOSE BLDC GLUCOMTR-MCNC: 93 MG/DL (ref 70–99)

## 2018-09-12 PROCEDURE — 40000193 ZZH STATISTIC PT WARD VISIT: Performed by: PHYSICAL THERAPIST

## 2018-09-12 PROCEDURE — 97110 THERAPEUTIC EXERCISES: CPT | Mod: GP | Performed by: PHYSICAL THERAPIST

## 2018-09-12 PROCEDURE — 25000125 ZZHC RX 250: Performed by: INTERNAL MEDICINE

## 2018-09-12 PROCEDURE — 99207 ZZC APP CREDIT; MD BILLING SHARED VISIT: CPT | Performed by: PHYSICIAN ASSISTANT

## 2018-09-12 PROCEDURE — 25000132 ZZH RX MED GY IP 250 OP 250 PS 637: Mod: GY | Performed by: INTERNAL MEDICINE

## 2018-09-12 PROCEDURE — 97110 THERAPEUTIC EXERCISES: CPT | Mod: GO | Performed by: OCCUPATIONAL THERAPIST

## 2018-09-12 PROCEDURE — 25000131 ZZH RX MED GY IP 250 OP 636 PS 637: Mod: GY | Performed by: INTERNAL MEDICINE

## 2018-09-12 PROCEDURE — 12000022 ZZH R&B SNF

## 2018-09-12 PROCEDURE — 00000146 ZZHCL STATISTIC GLUCOSE BY METER IP

## 2018-09-12 PROCEDURE — A9270 NON-COVERED ITEM OR SERVICE: HCPCS | Mod: GY | Performed by: INTERNAL MEDICINE

## 2018-09-12 PROCEDURE — 99207 ZZC CDG-HISTORY COMP: MEETS EXP. PROBLEM FOCUSED-DOWN CODED LACK OF ROS: CPT | Performed by: INTERNAL MEDICINE

## 2018-09-12 PROCEDURE — 97162 PT EVAL MOD COMPLEX 30 MIN: CPT | Mod: GP | Performed by: PHYSICAL THERAPIST

## 2018-09-12 PROCEDURE — 99304 1ST NF CARE SF/LOW MDM 25: CPT | Performed by: INTERNAL MEDICINE

## 2018-09-12 PROCEDURE — 97165 OT EVAL LOW COMPLEX 30 MIN: CPT | Mod: GO | Performed by: OCCUPATIONAL THERAPIST

## 2018-09-12 PROCEDURE — 97116 GAIT TRAINING THERAPY: CPT | Mod: GP | Performed by: PHYSICAL THERAPIST

## 2018-09-12 PROCEDURE — 40000133 ZZH STATISTIC OT WARD VISIT: Performed by: OCCUPATIONAL THERAPIST

## 2018-09-12 RX ORDER — LISINOPRIL 2.5 MG/1
5 TABLET ORAL DAILY
Status: DISCONTINUED | OUTPATIENT
Start: 2018-09-13 | End: 2018-09-26 | Stop reason: HOSPADM

## 2018-09-12 RX ADMIN — NYSTATIN 1000000 UNITS: 100000 SUSPENSION ORAL at 21:05

## 2018-09-12 RX ADMIN — HYDRALAZINE HYDROCHLORIDE 100 MG: 50 TABLET ORAL at 21:05

## 2018-09-12 RX ADMIN — NYSTATIN 1000000 UNITS: 100000 SUSPENSION ORAL at 12:29

## 2018-09-12 RX ADMIN — PANTOPRAZOLE SODIUM 40 MG: 40 TABLET, DELAYED RELEASE ORAL at 08:12

## 2018-09-12 RX ADMIN — HYDRALAZINE HYDROCHLORIDE 100 MG: 50 TABLET ORAL at 06:34

## 2018-09-12 RX ADMIN — NYSTATIN 1000000 UNITS: 100000 SUSPENSION ORAL at 18:11

## 2018-09-12 RX ADMIN — ATORVASTATIN CALCIUM 20 MG: 20 TABLET, FILM COATED ORAL at 21:05

## 2018-09-12 RX ADMIN — PREDNISONE 5 MG: 5 TABLET ORAL at 18:11

## 2018-09-12 RX ADMIN — AMLODIPINE BESYLATE 10 MG: 5 TABLET ORAL at 08:11

## 2018-09-12 RX ADMIN — Medication 1 CAPSULE: at 08:12

## 2018-09-12 RX ADMIN — INSULIN ASPART 1 UNITS: 100 INJECTION, SOLUTION INTRAVENOUS; SUBCUTANEOUS at 18:38

## 2018-09-12 RX ADMIN — TACROLIMUS 2 MG: 1 CAPSULE ORAL at 21:05

## 2018-09-12 RX ADMIN — HYDRALAZINE HYDROCHLORIDE 100 MG: 50 TABLET ORAL at 14:37

## 2018-09-12 RX ADMIN — TACROLIMUS 2 MG: 1 CAPSULE ORAL at 08:12

## 2018-09-12 RX ADMIN — INSULIN GLARGINE 16 UNITS: 100 INJECTION, SOLUTION SUBCUTANEOUS at 08:10

## 2018-09-12 RX ADMIN — Medication 5 UNITS: at 08:13

## 2018-09-12 RX ADMIN — APIXABAN 2.5 MG: 2.5 TABLET, FILM COATED ORAL at 12:28

## 2018-09-12 RX ADMIN — PREDNISONE 5 MG: 5 TABLET ORAL at 08:12

## 2018-09-12 RX ADMIN — NYSTATIN 1000000 UNITS: 100000 SUSPENSION ORAL at 08:12

## 2018-09-12 RX ADMIN — ASPIRIN 81 MG: 81 TABLET, COATED ORAL at 08:12

## 2018-09-12 ASSESSMENT — ACTIVITIES OF DAILY LIVING (ADL): PREVIOUS_RESPONSIBILITIES: MEAL PREP;HOUSEKEEPING;LAUNDRY;SHOPPING;MEDICATION MANAGEMENT;FINANCES;DRIVING

## 2018-09-12 NOTE — H&P
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 CT 8/12 showed multiple inflamed loops of bowel, specifically with inflamed segments of proximal and distal sigmoid colon thought to likely be diverticulitis with associated 7.8cm pericolonic abscess. On exam, patient also found to have a 2.7 cm daniella-rectal abscess that was I&D'd 8/12/18. Completed cipro and flagyl course 8/14-8/27 after biopsy tested positive for Citrobacter freundii complex, VRE, Klebsiella pneumonia, pseudomonas aeruginosa and veillonella. Pt also tested positive for C.diff 8/13 and treated w/PO Vanco x 24D (continued for 10D after course for intraabdominal infection). IR was consulted for pericolonic abscess, but felt no safe window to drain abscess safely, so pt then taken back to the OR on 8/14/18 for lap sigmoidectomy with end colostomy. 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 CT enterography 8/30 was negative for abscess with improvement in dilated bowel. This was later complicated by  ileus requiring NG decompression and concern for bleeding when anticoagulation was reintroduced as patient was found to have down-trending Hgb. Pt was transfused with 3U PRBCs 8/21-, 8/24. Most recent Hgb on upward trend of 8.2 9/11.   -WOCN for ostomy cares.  -Monitor for further signs of ileus/surgical complications  -CBC to assess Hgb QM/F  -Continue contact precautions for VRE   -F/U with colorectal surgery as outpatient  -F/U with transplant ID as outpatient    Neutropenic leukopenia: Per transplant ID, this is likely 2/2 immunosuppression with tacrolimus and prednisone. WBC since 8/29 has been low with most recent WBC 1.2 on 9/11. ANC 0.9 on 9/11. Immunknow level 95, representing low immune response on 9/6.   -Continue neutropenic precautions  -CMV viral loads qThurs--pt off of Valcyte  -CBC to assess WBC/ANC QM/F--per Cardiology discharge summary, if ANC counts continue to remain < or = to 1.0, may give one or more G-CSF doses until ANC count >1  -Repeat Immunoknow level once WBC/ANC count normalizes  -Continue PTA medications    NCIM s/p heart transplant (6/14/18), RIJ thrombus: Pt had history of systolic HF 2/2 NICM, CKD, HTN, HLD and DM2. He was initially listed for both heart and kidney transplants, but ultimately received the heart transplant on 6/14/18 after being hospitalized for inotrope administration. Per post-op visit notes, he did initially have some post-op leukocytosis that was successfully treated. Additionally, he developed a RIJ thrombus following transplant that was again seen on US 8/12, but was nonocclusive. Repeat US on 9/11 showed occlusive thrombus to the lower RIJ. This was interpreted to be more chronic versus worsening. He has otherwise been doing clinically well without evidence of graft dysfunction. Most recent echo 7/20 showed global and regional left ventricular function that was hyperkinetic with an EF of >70%, mild-moderate LVH, and RV with normal size and systolic function  with mild hypertrophy. Per ID, immunosuppression with Mycophenolate 500mg BID, Tacrolimus 3mg BID (Goal of 6-8 d/t infection), Prednisone 5 mg BID. Most recent tacro level WNL at 5.8 on 9/10.  -Per transplant ID, continue valganciclovir until 3 mos post transplant (9/14/18)  -Tacro levels QM/F  -Weekly CMV PCRs  -Next right heart cath and biopsy due in 2 weeks--Dr. Ernst, his primary cardiologist to give further recs regarding immunosuppression based on next biopsy results.  -Per Dr. Ernst, pt is to remain off of Eliquis 2/2 to recent significant bleed    HTN:  BP slightly elevated on admission with BP of 150s-160s/90s. Managed on hydralazine, amlodipine and lisinopril outpatient. Lisinopril initially held when pt's Hgb dropped, but restarted later at 2.5mg daily (pt had been previously taking 5mg). BP this afternoon 138/79.  -Continue to monitor BP  -Continue PTA medications (increase lisinopril back to 5 mg daily).       ESRD on HD: 2/2 HTN and DM2. Pt not currently on transplant list as of 8/29 due to patient being to ill. Per nephrology, needs rehab and better nutritional status to be active.  -Continue HD T, Th,Sa  -F/U with Nephrology outpatient (sees Dr. Mcpherson)  -Monitor I/Os  -Daily weights  -Monitor electrolytes via BMP Q M/F    DM2: Most recent Hgb A1c of 7.0 7/18 and was stable from 4/18. PTA on NPH 30 units qam, 16 units qpm. While in hospital, managed on lantus 16U every AM before breakfast, and sliding scale Novolog.     Recent Labs  Lab 09/12/18  0759 09/11/18  2321 09/11/18  1837 09/11/18  1447 09/11/18  1207 09/11/18  0741  09/10/18  0640  09/09/18  0636  09/08/18  0612  09/07/18  0534  09/06/18  0705   GLC  --   --   --   --   --   --   --  108*  --  78  --  110*  --  78  --  132*   BGM 93 180* 153* 135* 156* 70  < >  --   < >  --   < >  --   < >  --   < >  --    < > = values in this interval not displayed.   -Discontinue Lantus and MSSI tomorrow am and start PTA NPH but at half dosages  and titrate up if indicated.   -Carb controlled diet  -BG monitoring QID   -Hypoglycemic protocol.    Acute on Chronic Anemia: Likely acute exacerbation in setting of colitis. While inpt, pt did receive 3U PRBCs for decreased Hgb 8/21-8/23 with lowest Hgb of 6.9. BL appears to be ~8.5-11.0. Most recent Hgb 9/11 8.2, which was improved from 7.9 on 9/10. Lisinopril held in setting of anemia exacerbation. Peripheral smear with no acute concerns 8/31.   -Continue Epo per nephrology  -Continue ASA 81mg     Non-Severe Protein Calorie Malnutrition: Albumin 2.0 9/10/18 with noted improvement in oral intake.   - Nutrition consulted and appreciate recommendations  - Calorie counts  -Continue carb controlled diet    New small pulmonary nodules:  CT abdomen/pelvis 8/12 showed new small (3mm and 5mm) pulmonary nodules. Recommended patient have repeat CT chest in 4-6 weeks for follow-up regarding this; however, they were found to be increased to 6mm on 8/29 CT entergraphy.  -F/U in pulmonary nodule clinic     Resolved/Ongoing Problems:  GERD: Continue PTA protonix.  HLD: Most recent lipid panel 7/18 with TC of 175, HDL of 92, LDL of 66. Managed with atorvastatin outpatient.  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 NGTD from repeat cultures 7/26, 7/28, 8/10 and 8/12. Per ID, long course of antibiotics as above active against Pseudomonas (cefepime through 8/23, then ciprofloxacin 8/27), but there is still a risk of reoccurrence. Oral ulcer was noted to be improved on discharge from [facility].  -Monitor for fevers with low threshold to order blood cultures if needed  -Monitor for new or worsening oral ulcers      I have discussed the details of this case with Leonid Muller PA-C who will follow with the progress of the patient       Diet and/or tube feedings: Carbohydrate Controlled Diet    Lines, tubes, drains: Hemodialysis  DVT/GI prophylaxis: Pneumatic Compression Devices and Eliquis  Indications for psychotropic medications: N/A  Code status discussed on admission: Full Code     Consults:   PT         History of Present Illness:   Please refer to H&P in chart dated 8/12 by Tres Murray and Kulwant for full details. In brief, Mr. 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    When I met patiently, he was resting comfortably. I instantly recognized him as the  at [employer] at the [facility], who helped with my wedding suit.  Patient has been off work for almost 7-9 months with health related issues including transplant   He denies chest pain/ SOB   No fevers or chills  Says his stoma is working well  He will continue his dialysis at [facility]. His friend will pick him up            Physical Exam:   Vitals were reviewed  Blood pressure 141/82, pulse 96, temperature 97.9  F (36.6  C), temperature source Oral, resp. rate 18, weight 80.4 kg (177 lb 3.2 oz), SpO2 100 %.  GEN: In NAD  HEENT: NCAT; PERRL; sclerae non-icteric; oral ulcer noted without bleeding or infection  LUNGS: CTAB  CV: RRR  ABD: +BSs;. Stoma looks healthy   EXT: 1+ BLE edema  SKIN: No acute rashes noted on exposed areas.  NEURO: AAOx3; CNs grossly intact; No acute focal deficits noted.           Past Medical History:     Past Medical History:   Diagnosis Date     (HFpEF) heart failure with preserved ejection fraction (H)      Allergic rhinitis, cause unspecified      Anemia of chronic kidney failure      AS (aortic stenosis)      Ascending aortic aneurysm (H)      Bicuspid aortic valve      CAD (coronary artery disease)      Chronic kidney disease, stage 5 (H)      Congestive heart failure, unspecified      Dyslipidemia       Esophageal reflux      ESRD (end stage renal disease) (H)      Hypersomnia with sleep apnea, unspecified      Hypertension      MGUS (monoclonal gammopathy of unknown significance)      Mitral regurgitation      SHEELA (obstructive sleep apnea)      Systolic heart failure (H)      Type 2 diabetes mellitus (H)              Past Surgical History:      Past Surgical History:   Procedure Laterality Date     COLONOSCOPY N/A 5/3/2018    Procedure: COLONOSCOPY;  colonoscopy ;  Surgeon: Ammon Castillo MD;  Location: [facility]     ESOPHAGOSCOPY, GASTROSCOPY, DUODENOSCOPY (EGD), COMBINED N/A 5/7/2018    Procedure: COMBINED ENDOSCOPIC ULTRASOUND, ESOPHAGOSCOPY, GASTROSCOPY, DUODENOSCOPY (EGD), FINE NEEDLE ASPIRATE/BIOPSY;  Endoscopic Ultrasound with Fine Needle Aspiration ;  Surgeon: Alon Don MD;  Location[facility] OR     EXAM UNDER ANESTHESIA RECTUM N/A 8/12/2018    Procedure: EXAM UNDER ANESTHESIA RECTUM;  EXAM UNDER ANESTHESIA RECTUM ,COMBINED INCISION AND DRAINAGE OF RECTAL ABCESS ;  Surgeon: Rick Tran MD;  Location: [facility] OR     INCISION AND DRAINAGE RECTUM, COMBINED N/A 8/12/2018    Procedure: COMBINED INCISION AND DRAINAGE RECTUM;;  Surgeon: Rick Tran MD;  [facility]     LAPAROSCOPIC ASSISTED SIGMOID COLECTOMY N/A 8/14/2018    Procedure: LAPAROSCOPIC ASSISTED SIGMOID COLECTOMY;  Laparoscopic Hand Assisted Takedown of Splenic Flexure, Sigmoidectomy, Small Bowel Resection, Takedown of Small Bowel to Colon Fistula;  Surgeon: Rick Tran MD;  [facility]     LAPAROSCOPIC HERNIORRHAPHY INGUINAL BILATERAL Bilateral 7/24/2015    Procedure: LAPAROSCOPIC HERNIORRHAPHY INGUINAL BILATERAL;  Surgeon: Bobby Mcconnell MD;  [facility]     LAPAROSCOPIC INSERTION CATHETER PERITONEAL DIALYSIS N/A 6/22/2017    Procedure: LAPAROSCOPIC INSERTION CATHETER PERITONEAL DIALYSIS;  Laparoscopic Peritoneal Dialysis Catheter Placement - Anesthesia with block;  Surgeon: Esteban Arvizu  MD [employer];  Location: [facility]     PICC INSERTION Left 2018    5Fr - 49cm (3cm external), Basilic vein, low SVC     REMOVE CATHETER PERITONEAL Right 1/15/2018    Procedure: REMOVE CATHETER PERITONEAL;  Open Removal of Peritoneal Dialysis Catheter ;  Surgeon: Esteban Arvizu MD;  [facility]     TRANSPLANT HEART RECIPIENT N/A 2018    Procedure: TRANSPLANT HEART RECIPIENT;  Median Sternotomy, on-pump oxygenator, Heart Transplant;  Surgeon: Rony Caputo MD;  Location: [facility] OR             Family History:     Family History   Problem Relation Age of Onset     C.A.D. Father       from-never knew father-age 60     Diabetes Father      Cerebrovascular Disease Father      Hypertension No family hx of      Breast Cancer No family hx of      Cancer - colorectal No family hx of      Prostate Cancer No family hx of      KIDNEY DISEASE No family hx of      Melanoma No family hx of      Skin Cancer No family hx of              Social History:     Social History   Substance Use Topics     Smoking status: Former Smoker     Packs/day: 1.00     Years: 19.00     Types: Cigarettes     Quit date: 1994     Smokeless tobacco: Never Used     Alcohol use No        Living situation prior to admission: [employer]         Medications:   Current Facility-Administered Medications on File Prior to Encounter:  acetaminophen (TYLENOL) 325 MG tablet Take 2 tablets (650 mg) by mouth every 4 hours as needed for mild pain (multimodal surgical pain management along with NSAIDS and opioid medication as indicated based on pain control and physical function.)   albuterol (PROAIR HFA/PROVENTIL HFA/VENTOLIN HFA) 108 (90 Base) MCG/ACT inhaler Inhale 2 puffs into the lungs every 4 hours as needed for shortness of breath / dyspnea or wheezing   amLODIPine (NORVASC) 10 MG tablet Take 1 tablet (10 mg) by mouth daily   ASPIRIN 81 MG OR TABS Take 1 tablet (81 mg) by mouth at bedtime   biotin (BIOTIN 5000) 5 MG CAPS Take 5 mg by mouth  daily   blood glucose monitoring (ACCU-CHEK FASTCLIX) lancets Use to test blood sugar 2-3 times daily or as directed.  Ok to substitute alternative if insurance prefers.   blood glucose monitoring (NO BRAND SPECIFIED) test strip Use to test blood sugar 2-3 times daily or as directed.   fluticasone (FLONASE) 50 MCG/ACT spray Spray 1-2 sprays into both nostrils daily   glucagon 1 MG SOLR injection Inject 1 mg Subcutaneous every 15 minutes as needed for low blood sugar (May repeat x 1 only)   glucose 40 % (400 mg/mL) GEL gel Take 15-30 g by mouth every 15 minutes as needed for low blood sugar   hydrALAZINE (APRESOLINE) 100 MG TABS tablet Take 1 tablet (100 mg) by mouth every 8 hours   insulin aspart (NOVOLOG PEN) 100 UNIT/ML injection Inject 1-6 Units Subcutaneous 3 times daily (with meals)   insulin aspart (NOVOLOG PEN) 100 UNIT/ML injection Inject 1-5 Units Subcutaneous At Bedtime   insulin glargine (LANTUS SOLOSTAR) 100 UNIT/ML pen Inject 16 Units Subcutaneous every morning (before breakfast)   lisinopril (PRINIVIL/ZESTRIL) 2.5 MG tablet Take 1 tablet (2.5 mg) by mouth daily   loratadine (CLARITIN) 10 MG tablet Take 10 mg by mouth daily Reported on 5/3/2017   melatonin 1 MG TABS tablet Take 2 tablets (2 mg) by mouth nightly as needed for sleep   NEPHROCAPS 1 MG capsule Take 1 capsule by mouth daily   nystatin (MYCOSTATIN) 938781 UNIT/ML suspension Swish and swallow 10 mLs (1,000,000 Units) in mouth 4 times daily   order for DME Equipment being ordered: Carol ()Treatment Diagnosis: ESRD on PDPt has to be connected to PD all night and can not be disconnected, hence impending his mobility to go to the bathroom. At risk for infection if he does not have this equipment. (Patient not taking: Reported on 7/26/2018)   pantoprazole (PROTONIX) 40 MG EC tablet Take 1 tablet (40 mg) by mouth every morning   [START ON 9/14/2018] pentamidine (NEBUPENT) 300 MG neb solution Inhale 300 mg into the lungs once for 1 dose    polyethylene glycol (MIRALAX/GLYCOLAX) Packet Take 17 g by mouth daily as needed for constipation   predniSONE (DELTASONE) 5 MG tablet Take 1 tablet (5 mg) by mouth 2 times daily (with meals)   rosuvastatin (CRESTOR) 20 MG tablet Take 1 tablet (20 mg) by mouth daily   simethicone (MYLICON) 80 MG chewable tablet Take 1 tablet (80 mg) by mouth every 6 hours as needed for cramping   sodium chloride, PF, 0.9% PF flush 3 mLs by Intracatheter route every hour as needed for line flush (for peripheral IV flush post IV meds)   sodium chloride, PF, 0.9% PF flush 3 mLs by Intracatheter route every 8 hours   tacrolimus (GENERIC EQUIVALENT) 1 MG capsule Take 2 capsules (2 mg) by mouth 2 times daily   traMADol (ULTRAM) 50 MG tablet Take 1 tablet (50 mg) by mouth every 12 hours as needed for moderate pain   triamcinolone (KENALOG) 0.1 % ointment Apply topically 2 times daily   Urea 40 % CREA Externally apply topically daily            Allergies:     Allergies   Allergen Reactions     Norco [Hydrocodone-Acetaminophen] Nausea and Vomiting     Cats      Throat tightness     Isosorbide Other (See Comments)     hypotension     Penicillins Hives     Seasonal Allergies      rhinitis     Shrimp      Throat closes              Labs:     CMP    Recent Labs  Lab 09/10/18  0640 09/09/18  0636 09/08/18  0612 09/07/18  1819 09/07/18  0534    135 134  --  136   POTASSIUM 3.7 3.8 3.8  --  3.9   CHLORIDE 100 98 98  --  99   CO2 29 29 30  --  31   ANIONGAP 8 8 7  --  6   * 78 110*  --  78   BUN 20 12 20  --  12   CR 4.50* 3.35* 4.61*  --  3.49*   GFRESTIMATED 13* 19* 13*  --  18*   GFRESTBLACK 16* 23* 16*  --  22*   TRINI 7.6* 7.9* 8.0*  --  8.0*   MAG 1.8  --   --  2.6* 1.5*   PHOS 2.4*  --   --   --  1.7*   PROTTOTAL 5.0*  --   --   --   --    ALBUMIN 2.0*  --   --   --   --    BILITOTAL 0.5  --   --   --   --    ALKPHOS 141  --   --   --   --    AST 14  --   --   --   --    ALT 14  --   --   --   --      CBC    Recent Labs  Lab  09/11/18  1342 09/10/18  0640 09/09/18  0636 09/08/18  0612   WBC 1.2* 1.2* 1.0* 1.0*   RBC 2.57* 2.48* 2.61* 2.65*   HGB 8.2* 7.9* 8.3* 8.3*   HCT 26.5* 25.0* 26.8* 26.3*   * 101* 103* 99   MCH 31.9 31.9 31.8 31.3   MCHC 30.9* 31.6 31.0* 31.6   RDW 22.1* 21.8* 21.9* 21.5*    160 167 139*     INR    Recent Labs  Lab 09/10/18  0640   INR 1.05        Dr ARTHUR Schuler MD, [facility]  Hospitalist ( Internal medicine)  Pager: [phone]

## 2018-09-12 NOTE — PLAN OF CARE
Problem: Goal Outcome Summary  Goal: Goal Outcome Summary  Outcome: No Change  Patient is new admit from yesterday,TB test given,no skin check this shift,patient in and out of his room for PT/OT  evaluation and when he gets back he prefered to be left alone in order to rest.Blood sugar checks before meals,he did not call @ noon.Colostomy intact with moderate stool,staff assisted patient with cares.

## 2018-09-12 NOTE — PLAN OF CARE
Problem: TCU Patient Goals  Goal: TCU Plan of Care  Patient is a 63 year old male  admitted to room 417 via transport. The patient is alert and oriented X 3. See Epic for VS and assessment. The patient is able to transfer independently. Patient was settled into their room, shown call light, tv, mealtimes etc. Oriented to unit. Full skin assessment. Incision to mid chest is CDI and OBED. Dialysis port intact. Colostomy is intact with small brown color soft stool. Edema 1+ BLE. Denies pain. Will continue monitoring pain level and VS. Notifying MD with any concerns.  Follow MD orders for cares and medications.    Level of Schooling:Some college  Ethnicity:  Marital Status:Single  Dentures: No  Hearing Aid: No  Smoker:No  Glasses: Yes  Occupation:None  Falls 0-1 mo:1  Stairs prior function: Yes  Prior device use: No  [facility] Directive Referral to Social Work? No

## 2018-09-12 NOTE — PLAN OF CARE
Problem: Goal Outcome Summary  Goal: Goal Outcome Summary  PT: Patient is a 63 year old male admitted to TCU s/p hospitalization on 8/12/18 following onset of abdominal pain; underwent surgery and colostomy placed. Patient received heart transplant in June 2018 and is in ESRD awaiting kidney transplant; receives dialysis 3x/week. Patient currently requires assist for stair negotiation and demonstrated buckling on 10-12 stairs which places him at an increased risk for falls. Patient is a good rehab candidate due to PLOF and motivation in returning home. Patient would benefit from skilled PT services for BLE strengthening, cardiovascular conditioning, functional mobillity upgrading and d/c planning. Anticipated d/c home    Dialysis T/R/Sat with  ~10AM by friends/family  No anticipated DME needs at discharge

## 2018-09-12 NOTE — PROGRESS NOTES
TCU Care Coordinator Progress Note    Admission date: 9/11/2018    Data: Murray Nicholson is a 62 yo male on TCU for rehab following hospitalization for bowel resection and colostomy for perianal and pericolonic abscess. He had a heart transplant in June 2018.    Intervention: Met with patient to introduce the role of Care Coordinator and to begin discussion of anticipated DC planning needs.    Assessment: Patient states goal of going home by 9/17/2018, primary barrier is many stairs to enter his home. He plans to drive and live independently as soon as he gets home, not a home care candidate. He can independently change colostomy and manage ostomy cares. [facility] provided him supplies and sent various samples to his home. He will try these samples then order what he prefers from [facility] himself once home. He is diabetic, on insulin, has managed insulin independently a long time. He is also on Eliquis for anticoagulation, which is not new, no new teaching needs. He goes to dialysis Tues/Thurs/Sat at the same center he has attended for awhile.     Plan: Will continue to follow DC planning needs throughout TCU stay. He will have a ride home from friend upon DC to home. Likely DC in 5-7 days if he meets TCU therapy goals and remains medically stable.    Eb Howell RN, BSN, Patient Care Management Coordinator  [facility]  [address], [address] [place] [address]  [email or web address]  [email or web address]   Desk: [phone] TCU Main [phone] Fax [phone] Pager [phone]

## 2018-09-12 NOTE — PROGRESS NOTES
" 09/12/18 0700   Quick Adds   Quick Adds Certification   Type of Visit Initial PT Evaluation   Living Environment   Lives With alone   Living Arrangements apartment   Home Accessibility stairs to enter home;tub/shower is not walk in   Number of Stairs to Enter Home 24   Number of Stairs Within Home 0   Stair Railings at Home outside, present on right side   Transportation Available car;family or friend will provide   Living Environment Comment Lives in 2nd floor apartment; has landings to rest on if tired.   Self-Care   Dominant Hand right   Usual Activity Tolerance good   Current Activity Tolerance fair   Regular Exercise no   Equipment Currently Used at Home none   Activity/Exercise/Self-Care Comment Patient denies regular exercise except stretching daily. Did not attend cardiac rehab   Functional Level Prior   Ambulation 0-->independent   Transferring 0-->independent   Toileting 0-->independent   Bathing 0-->independent   Dressing 0-->independent   Eating 0-->independent   Communication 0-->understands/communicates without difficulty   Swallowing 0-->swallows foods/liquids without difficulty   Cognition 0 - no cognition issues reported   Fall history within last six months yes   Number of times patient has fallen within last six months 1   Which of the above functional risks had a recent onset or change? ambulation   Prior Functional Level Comment Reports MOD I with functional mobility and ADLs prior to admission.   General Information   Onset of Illness/Injury or Date of Surgery - Date 08/12/18   Referring Physician Dr. 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   Precautions/Limitations fall precautions   Weight-Bearing Status - LUE full weight-bearing   Weight-Bearing Status - RUE full weight-bearing   Weight-Bearing Status - LLE full weight-bearing   Weight-Bearing Status - RLE full weight-bearing   Heart Disease Risk Factors Lack of physical activity;Stress;Medical history;Gender;Race   General Observations Patient sitting in bedside chair eating breakfast; RN present to administer meds   General Info Comments Patient has colostomy    Cognitive Status Examination   Orientation orientation to person, place and time   Level of Consciousness alert   Follows Commands and Answers Questions 100% of the time;able to follow multistep instructions   Personal Safety and Judgment intact   Memory intact   Pain Assessment   Patient Currently in Pain No   Integumentary/Edema   Integumentary/Edema Comments Mild BLE swelling   Posture    Posture Forward head position;Protracted shoulders   Range of Motion (ROM)   ROM Comment B ankle DF neutral, B knee extension lacking 5 degrees from neutral   MMT: Hip, Rehab Eval   Hip Flexion - Left Side (3-/5) fair minus, left   Hip Extension - Left Side (2/5) poor, left   Hip ABduction - Left Side (3-/5) fair minus, left   Hip Flexion - Right Side (3-/5) fair minus, right   Hip Extension - Right Side (2/5) poor, right   Hip ABduction - Right Side (3-/5) fair minus, right   MMT: Knee, Rehab Eval   Knee Flexion - Left Side (4/5) good, left   Knee Extension - Left Side (4/5) good, left   Knee Flexion - Right Side (4/5) good, right   Knee Extension - Right Side (4/5) good, right   MMT: Ankle, Rehab Eval   Ankle Dorsiflexion - Left Side (4/5) good, left   Ankle Plantarflexion - Left Side (3/5) fair, left   Ankle Dorsiflexion - Right Side (4/5) good, left    Ankle Plantarflexion - Right Side (3/5) fair, left   Bed Mobility   Bed Mobility Comments Independent with supine to/from sit   Transfer Skills   Transfer Comments Independent with SPT without device   Gait   Gait Comments Patient ambulated x 190 feet, 2 reps with supervision for safety; mild posterior lean and shuffled gait pattern. Patient ambulates slowly and fatigues easily requiring ~2 minute rest break after ambulation   Balance   Balance Comments Intact sitting balance, supervision for standing dynamic balance due to mild posterior lean   Sensory Examination   Sensory Perception no deficits were identified   Coordination   Coordination no deficits were identified   Muscle Tone   Muscle Tone no deficits were identified   Modality Interventions   Planned Modality Interventions Thermotherapy: Hydrocollator Packs;Cryotherapy   Planned Modality Interventions Comments As needed   General Therapy Interventions   Planned Therapy Interventions balance training;gait training;manual therapy;neuromuscular re-education;strengthening;stretching;risk factor education;home program guidelines;progressive activity/exercise   Clinical Impression   Criteria for Skilled Therapeutic Intervention yes, treatment indicated   PT Diagnosis BLE strength deficits; deconditioning   Influenced by the following impairments see clinical impression   Functional limitations due to impairments see clinical impression   Clinical Presentation Stable/Uncomplicated   Clinical Presentation Rationale current medical status, [facility]   Clinical Decision Making (Complexity) Moderate complexity   Therapy Frequency` other (see comments)  (6x/week)   Predicted Duration of Therapy Intervention (days/wks) 7 days   Anticipated Discharge Disposition Home   Risk & Benefits of therapy have been explained Yes   Patient, Family & other staff in agreement with plan of care Yes   Clinical Impression Comments Patient is a 63 year old male admitted to TCU s/p  hospitalization on 8/12/18 following onset of abdominal pain; underwent surgery and colostomy placed. Patient received heart transplant in June 2018 and is in ESRD awaiting kidney transplant; receives dialysis 3x/week. Patient currently requires assist for stair negotiation and demonstrated buckling on 10-12 stairs which places him at an increased risk for falls. Patient is a good rehab candidate due to PLOF and motivation in returning home. Patient would benefit from skilled PT services for BLE strengthening, cardiovascular conditioning, functional mobillity upgrading and d/c planning. Anticipated d/c home   Therapy Certification   Start of care date 09/12/18   Certification date from 09/12/18   Certification date to 10/11/18   Medical Diagnosis ESRD   Certification I certify the need for these services furnished under this plan of treatment and while under my care.  (Physician co-signature of this document indicates review and certification of the therapy plan).    Total Evaluation Time   Total Evaluation Time (Minutes) 18

## 2018-09-12 NOTE — PLAN OF CARE
Problem: Patient Care Overview  Goal: Plan of Care/Patient Progress Review  Occupational Therapy Discharge Summary    Reason for therapy discharge:    Discharged to acute rehabilitation facility.    Progress towards therapy goal(s). See goals on Care Plan in [facility] electronic health record for goal details.  Goals partially met.  Barriers to achieving goals:   discharge from facility.    Therapy recommendation(s):    Continued therapy is recommended.  Rationale/Recommendations:  to progress activity tolerance and maximize ADL/IADL IND and safety. .

## 2018-09-12 NOTE — PROGRESS NOTES
09/12/18 0818   Quick Adds   Quick Adds Certification   Type of Visit Initial Occupational Therapy Evaluation   Living Environment   Lives With alone   Living Arrangements apartment   Home Accessibility bed and bath on same level;stairs to enter home;tub/shower is not walk in   Number of Stairs to Enter Home 24   Number of Stairs Within Home 0   Stair Railings at Home inside, present on right side   Transportation Available car;family or friend will provide   Living Environment Comment patient lives alone in an apartment on the 2nd floor - reprots there are 24 stairs to climb to reach his apartment   Self-Care   Dominant Hand right   Usual Activity Tolerance good   Current Activity Tolerance fair   Regular Exercise yes   Activity/Exercise Type other (see comments)  (cardiac rehab)   Equipment Currently Used at Home walker, rolling   Activity/Exercise/Self-Care Comment patient reports he has royce doing pretty well overall at home - occasionally using walker for longer distances. Also states he has not been to cardiac rehab consistently 2/2 multiple MD appointments   Functional Level Prior   Ambulation 1-->assistive equipment   Transferring 0-->independent   Toileting 0-->independent   Bathing 0-->independent   Dressing 0-->independent   Eating 0-->independent   Communication 0-->understands/communicates without difficulty   Swallowing 0-->swallows foods/liquids without difficulty   Cognition 0 - no cognition issues reported   Fall history within last six months yes   Number of times patient has fallen within last six months 1   Which of the above functional risks had a recent onset or change? ambulation;transferring;toileting;bathing;dressing   Prior Functional Level Comment patient states he was Mod I in functional mobility  and ADL's prior to hospital admit       Present no   Language English   General Information   Onset of Illness/Injury or Date of Surgery - Date 08/12/18   Referring  Physician Dr. Penny Navarro   Patient/Family Goals Statement return to home   Additional Occupational Profile Info/Pertinent History of Current Problem patient is a 63 y.o. male h/o heart transplant 6/2018 on immunosuppressants , ESRD on HD, R IJ thrombus on apixabam, T2DM, ongoing pseudomonal bacteremia p/w abd pain and BRBPR w/first episode of diverticulitis c/b paracolonic absce sen on CT, laparoscopic hand assited takedown of splenic flexure, sigmoidectomy, small bowel resection,. 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   Precautions/Limitations fall precautions;abdominal precautions   Weight-Bearing Status - LUE other (see comments)  (10#)   Weight-Bearing Status - RUE other (see comments)  (10#)   Weight-Bearing Status - LLE full weight-bearing   Weight-Bearing Status - RLE full weight-bearing   Heart Disease Risk Factors Diabetes;Lack of physical activity;Medical history;Gender;Age   General Observations activity : ambulate with assist   General Info Comments patient does own cooking , cleaning, laundry, finanaces and medication, has a frined that drives him to appointments   Cognitive Status Examination   Orientation orientation to person, place and time   Level of Consciousness alert   Able to Follow Commands WNL/WFL   Personal Safety (Cognitive) WNL/WFL   Memory intact   Attention No deficits were identified   Organization/Problem Solving No deficits were identified   Executive Function No deficits were identified   Cognitive Comment good historian, no deficits identified    Visual Perception   Visual Perception Wears glasses   Visual Perception Comments reports no changes in vision   Sensory Examination   Sensory Comments reports no tingling or numbness noted    Pain Assessment   Patient Currently in Pain No   Integumentary/Edema   Integumentary/Edema no deficits were identifed   Integumentary/Edema Comments no edema noted BLe's or UE's   Posture   Posture forward head position;protracted shoulders    Range of Motion (ROM)   ROM Comment B UE's WFL';s   Strength   Strength Comments NT- d/t abdominal precautions grossly 4/5 throughout with functioanl tasks   Hand Strength   Hand Strength Comments good grasp for B Ue's - did c/o decreased hand strength B Ue's   Muscle Tone Assessment   Muscle Tone Quick Adds No deficits were identified   Coordination   Coordination Comments B UE ' fingers/thimb opposition WFL's   Mobility   Bed Mobility Bed mobility skill: Sit to supine;Bed mobility skill: Supine to sit   Bed Mobility Skill: Sit to Supine   Level of [place]: Sit/Supine independent   Assistive Device: Sit/Supine bedrail   Bed Mobility Skill: Supine to Sit   Level of [place]: Supine/Sit independent   Assistive Device: Supine/Sit bedrail   Transfer Skill: Bed to Chair/Chair to Bed   Level of [place]: Bed to Chair independent   Transfer Skill: Sit to Stand   Level of [place]: Sit/Stand independent   Transfer Skill: Toilet Transfer   Level of [place]: Toilet independent   Toilet Transfer Skill Comments OT - uses sink and tub for supoprt at home    Tub/Shower Transfer   Tub/Shower Transfer Transfer Skill: Tub/Shower Transfers   Transfer Skill: Tub/Shower Transfer   Level of [place]: Tub/Shower independent   Weight-Bearing Restrictions: Tub/Shower full weight-bearing   Balance   Balance Comments IND static standing and dyunamci balance  no LOB noted with basic self care tasks   Bathing   Level of [place] independent   Upper Body Dressing   Level of [place]: Dress Upper Body independent   Lower Body Dressing   Level of [place]: Dress Lower Body independent   Toileting   Level of [place]: Toilet independent   Grooming   Level of [place]: Grooming independent   Eating/Self Feeding   Level of [place]: Eating independent   Instrumental Activities of Daily Living (IADL)   Previous Responsibilities meal prep;housekeeping;laundry;shopping;medication  management;finances;driving   Activities of Daily Living Analysis   Impairments Contributing to Impaired Activities of Daily Living strength decreased   General Therapy Interventions   Planned Therapy Interventions home program guidelines   Clinical Impression   Criteria for Skilled Therapeutic Interventions Met evaluation only   Influenced by the following impairments decresaed proximal strength   Assessment of Occupational Performance 1-3 Performance Deficits   Identified Performance Deficits ambulation / stairs to apartment    Clinical Decision Making (Complexity) Low complexity   Anticipated Discharge Disposition Home;Home with Assist   Risks and Benefits of Treatment have been explained. Yes   Patient, Family & other staff in agreement with plan of care Yes   Clinical Impression Comments OT- patient assessment completed - no skilled OT required at this time. decreased proximal strength with PT to see UE strengthening program edcuation completd for patient use while on unit    Therapy Certification   Start of Care Date 08/12/18   Certification date from 09/12/18   Certification date to 09/12/18   Medical Diagnosis see above for PMHX   Certification I certify the need for these services furnished under this plan of treatment and while under my care.  (Physician co-signature of this document indicates review and certification of the therapy plan).   Total Evaluation Time   Total Evaluation Time (Minutes) 30

## 2018-09-12 NOTE — PLAN OF CARE
Problem: Goal Outcome Summary  Goal: Goal Outcome Summary  Outcome: No Change  Patient is a new admit as of yesterday. Alert and oriented x4, pleasant. Minimal sleep interruptions maintained  per patient request-per evenings  report patient did not want to be woken up for the 0200 BG. Appeared to be sleeping during rounds. Colostomy pouch is intact. Call light in reach. Continue with POC.

## 2018-09-12 NOTE — PLAN OF CARE
Problem: Patient Care Overview  Goal: Plan of Care/Patient Progress Review  Physical Therapy Discharge Summary    Reason for therapy discharge:    Discharged to transitional care facility.    Progress towards therapy goal(s). See goals on Care Plan in [facility] electronic health record for goal details.  Goals not met.  Barriers to achieving goals:   discharge from facility.    Therapy recommendation(s):    Continued therapy is recommended.  Rationale/Recommendations:  TCU to progress strength, activity tolerance for safe dc home.

## 2018-09-12 NOTE — PROGRESS NOTES
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 CT 8/12 showed multiple inflamed loops of bowel, specifically with inflamed segments of proximal and distal sigmoid colon thought to likely be diverticulitis with associated 7.8cm pericolonic abscess. On exam, patient also found to have a 2.7 cm daniella-rectal abscess that was I&D'd 8/12/18. Completed cipro and flagyl course 8/14-8/27 after biopsy tested positive for Citrobacter freundii complex, VRE, Klebsiella pneumonia, pseudomonas aeruginosa and veillonella. Pt also tested positive for C.diff 8/13 and treated w/PO Vanco x 24D (continued for 10D after course for intraabdominal infection). IR was consulted for pericolonic abscess, but felt no safe window to drain abscess safely, so pt then taken back to the OR on 8/14/18 for lap sigmoidectomy with end colostomy. 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 CT enterography 8/30 was negative for abscess with improvement in dilated bowel. This was  later complicated by ileus requiring NG decompression and concern for bleeding when anticoagulation was reintroduced as patient was found to have down-trending Hgb. Pt was transfused with 3U PRBCs 8/21-, 8/24. Most recent Hgb on upward trend of 8.2 9/11.   -WOCN for ostomy cares.  -Monitor for further signs of ileus/surgical complications  -CBC to assess Hgb QM/F  -Continue contact precautions for VRE   -F/U with colorectal surgery as outpatient  -F/U with transplant ID as outpatient    Neutropenic leukopenia: Per transplant ID, this is likely 2/2 immunosuppression with tacrolimus and prednisone. WBC since 8/29 has been low with most recent WBC 1.2 on 9/11. ANC 0.9 on 9/11. Immunknow level 95, representing low immune response on 9/6.   -Continue neutropenic precautions  -CMV viral loads qThurs--pt off of Valcyte  -CBC to assess WBC/ANC QM/F--per Cardiology discharge summary, if ANC counts continue to remain < or = to 1.0, may give one or more G-CSF doses until ANC count >1  -Repeat Immunoknow level once WBC/ANC count normalizes  -Continue PTA medications    NCIM s/p heart transplant (6/14/18), RIJ thrombus: Pt had history of systolic HF 2/2 NICM, CKD, HTN, HLD and DM2. He was initially listed for both heart and kidney transplants, but ultimately received the heart transplant on 6/14/18 after being hospitalized for inotrope administration. Per post-op visit notes, he did initially have some post-op leukocytosis that was successfully treated. Additionally, he developed a RIJ thrombus following transplant that was again seen on US 8/12, but was nonocclusive. Repeat US on 9/11 showed occlusive thrombus to the lower RIJ. This was interpreted to be more chronic versus worsening. He has otherwise been doing clinically well without evidence of graft dysfunction. Most recent echo 7/20 showed global and regional left ventricular function that was hyperkinetic with an EF of >70%, mild-moderate LVH, and RV with normal size and  systolic function with mild hypertrophy. Per ID, immunosuppression with Mycophenolate 500mg BID, Tacrolimus 3mg BID (Goal of 6-8 d/t infection), Prednisone 5 mg BID. Most recent tacro level WNL at 5.8 on 9/10.  -Per transplant ID, continue valganciclovir until 3 mos post transplant (9/14/18)  -Tacro levels QM/F  -Weekly CMV PCRs  -Next right heart cath and biopsy due in 2 weeks--Dr. Ernst, his primary cardiologist to give further recs regarding immunosuppression based on next biopsy results.  -Per Dr. Ernst, pt is to remain off of Eliquis 2/2 to recent significant bleed    HTN:  BP slightly elevated on admission with BP of 150s-160s/90s. Managed on hydralazine, amlodipine and lisinopril outpatient. Lisinopril initially held when pt's Hgb dropped, but restarted later at 2.5mg daily (pt had been previously taking 5mg). BP this afternoon 138/79.  -Continue to monitor BP  -Continue PTA medications (increase lisinopril back to 5 mg daily).       ESRD on HD: 2/2 HTN and DM2. Pt not currently on transplant list as of 8/29 due to patient being to ill. Per nephrology, needs rehab and better nutritional status to be active.  -Continue HD T, Th,Sa  -F/U with Nephrology outpatient (sees Dr. Mcpherson)  -Monitor I/Os  -Daily weights  -Monitor electrolytes via BMP Q M/F    DM2: Most recent Hgb A1c of 7.0 7/18 and was stable from 4/18. PTA on NPH 30 units qam, 16 units qpm. While in hospital, managed on lantus 16U every AM before breakfast, and sliding scale Novolog.     Recent Labs  Lab 09/12/18  0759 09/11/18  2321 09/11/18  1837 09/11/18  1447 09/11/18  1207 09/11/18  0741  09/10/18  0640  09/09/18  0636  09/08/18  0612  09/07/18  0534  09/06/18  0705   GLC  --   --   --   --   --   --   --  108*  --  78  --  110*  --  78  --  132*   BGM 93 180* 153* 135* 156* 70  < >  --   < >  --   < >  --   < >  --   < >  --    < > = values in this interval not displayed.   -Discontinue Lantus and MSSI tomorrow am and start PTA NPH  but at half dosages and titrate up if indicated.   -Carb controlled diet  -BG monitoring QID   -Hypoglycemic protocol.    Acute on Chronic Anemia: Likely acute exacerbation in setting of colitis. While inpt, pt did receive 3U PRBCs for decreased Hgb 8/21-8/23 with lowest Hgb of 6.9. BL appears to be ~8.5-11.0. Most recent Hgb 9/11 8.2, which was improved from 7.9 on 9/10. Lisinopril held in setting of anemia exacerbation. Peripheral smear with no acute concerns 8/31.   -Continue Epo per nephrology  -Continue ASA 81mg     Non-Severe Protein Calorie Malnutrition: Albumin 2.0 9/10/18 with noted improvement in oral intake.   - Nutrition consulted and appreciate recommendations  - Calorie counts  -Continue carb controlled diet    New small pulmonary nodules:  CT abdomen/pelvis 8/12 showed new small (3mm and 5mm) pulmonary nodules. Recommended patient have repeat CT chest in 4-6 weeks for follow-up regarding this; however, they were found to be increased to 6mm on 8/29 CT entergraphy.  -F/U in pulmonary nodule clinic     Resolved/Ongoing Problems:  GERD: Continue PTA protonix.  HLD: Most recent lipid panel 7/18 with TC of 175, HDL of 92, LDL of 66. Managed with atorvastatin outpatient.  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 NGTD from repeat cultures 7/26, 7/28, 8/10 and 8/12. Per ID, long course of antibiotics as above active against Pseudomonas (cefepime through 8/23, then ciprofloxacin 8/27), but there is still a risk of reoccurrence. Oral ulcer was noted to be improved on discharge from [facility].  -Monitor for fevers with low threshold to order blood cultures if needed  -Monitor for new or worsening oral ulcers      Discussed with Harini.       Diet and/or tube feedings: Carbohydrate Controlled Diet   Lines, tubes, drains: Hemodialysis  DVT/GI prophylaxis: Pneumatic  Compression Devices and Eliquis  Indications for psychotropic medications: N/A  Code status discussed on admission: Full Code     Consults:   PT         History of Present Illness:   Please refer to H&P in chart dated 8/12 by Tres Murray and Kulwant for full details. In brief, Mr. 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    Currently, patient denies acute physical concerns including fever, chills, chest pain, SOB, nausea, abd pain, bowel and bladder concern.          Physical Exam:   Vitals were reviewed  Blood pressure 138/79, pulse 96, temperature 97.4  F (36.3  C), temperature source Oral, resp. rate 18, weight 80.4 kg (177 lb 3.2 oz), SpO2 98 %.  GEN: In NAD  HEENT: NCAT; PERRL; sclerae non-icteric; oral ulcer noted without bleeding or infection  LUNGS: CTAB  CV: RRR  ABD: +BSs; SNTND  EXT: 1+ BLE edema  SKIN: No acute rashes noted on exposed areas.  NEURO: AAOx3; CNs grossly intact; No acute focal deficits noted.           Past Medical History:     Past Medical History:   Diagnosis Date     (HFpEF) heart failure with preserved ejection fraction (H)      Allergic rhinitis, cause unspecified      Anemia of chronic kidney failure      AS (aortic stenosis)      Ascending aortic aneurysm (H)      Bicuspid aortic valve      CAD (coronary artery disease)      Chronic kidney disease, stage 5 (H)      Congestive heart failure, unspecified      Dyslipidemia      Esophageal reflux      ESRD (end stage renal disease) (H)      Hypersomnia with sleep apnea, unspecified      Hypertension      MGUS (monoclonal gammopathy of unknown significance)      Mitral regurgitation      SHEELA (obstructive sleep apnea)      Systolic heart failure (H)      Type 2 diabetes mellitus (H)              Past Surgical History:      Past Surgical  History:   Procedure Laterality Date     COLONOSCOPY N/A 5/3/2018    Procedure: COLONOSCOPY;  colonoscopy ;  Surgeon: Ammon Castillo MD;  Location: [facility]     ESOPHAGOSCOPY, GASTROSCOPY, DUODENOSCOPY (EGD), COMBINED N/A 5/7/2018    Procedure: COMBINED ENDOSCOPIC ULTRASOUND, ESOPHAGOSCOPY, GASTROSCOPY, DUODENOSCOPY (EGD), FINE NEEDLE ASPIRATE/BIOPSY;  Endoscopic Ultrasound with Fine Needle Aspiration ;  Surgeon: Alon Don MD;  Location: [facility]     EXAM UNDER ANESTHESIA RECTUM N/A 8/12/2018    Procedure: EXAM UNDER ANESTHESIA RECTUM;  EXAM UNDER ANESTHESIA RECTUM ,COMBINED INCISION AND DRAINAGE OF RECTAL ABCESS ;  Surgeon: Rick Tran MD;  Location: [facility]     INCISION AND DRAINAGE RECTUM, COMBINED N/A 8/12/2018    Procedure: COMBINED INCISION AND DRAINAGE RECTUM;;  Surgeon: Rick Tran MD;  Location: [facility]     LAPAROSCOPIC ASSISTED SIGMOID COLECTOMY N/A 8/14/2018    Procedure: LAPAROSCOPIC ASSISTED SIGMOID COLECTOMY;  Laparoscopic Hand Assisted Takedown of Splenic Flexure, Sigmoidectomy, Small Bowel Resection, Takedown of Small Bowel to Colon Fistula;  Surgeon: Rick Tran MD;  Location: [facility]     LAPAROSCOPIC HERNIORRHAPHY INGUINAL BILATERAL Bilateral 7/24/2015    Procedure: LAPAROSCOPIC HERNIORRHAPHY INGUINAL BILATERAL;  Surgeon: Bobby Mcconnell MD;  Location: [facility]     LAPAROSCOPIC INSERTION CATHETER PERITONEAL DIALYSIS N/A 6/22/2017    Procedure: LAPAROSCOPIC INSERTION CATHETER PERITONEAL DIALYSIS;  Laparoscopic Peritoneal Dialysis Catheter Placement - Anesthesia with block;  Surgeon: Esteban Arvizu MD;  Location: [facility]     PICC INSERTION Left 04/22/2018    5Fr - 49cm (3cm external), Basilic vein, low SVC     REMOVE CATHETER PERITONEAL Right 1/15/2018    Procedure: REMOVE CATHETER PERITONEAL;  Open Removal of Peritoneal Dialysis Catheter ;  Surgeon: Esteban Arvizu MD;  Location: [facility]     TRANSPLANT HEART RECIPIENT N/A 6/14/2018    Procedure:  TRANSPLANT HEART RECIPIENT;  Median Sternotomy, on-pump oxygenator, Heart Transplant;  Surgeon: Rony Caputo MD;  Location:  OR             Family History:     Family History   Problem Relation Age of Onset     C.A.D. Father       from-never knew father-age 60     Diabetes Father      Cerebrovascular Disease Father      Hypertension No family hx of      Breast Cancer No family hx of      Cancer - colorectal No family hx of      Prostate Cancer No family hx of      KIDNEY DISEASE No family hx of      Melanoma No family hx of      Skin Cancer No family hx of              Social History:     Social History   Substance Use Topics     Smoking status: Former Smoker     Packs/day: 1.00     Years: 19.00     Types: Cigarettes     Quit date: 1994     Smokeless tobacco: Never Used     Alcohol use No        Living situation prior to admission: [place]         Medications:   Current Facility-Administered Medications on File Prior to Encounter:  acetaminophen (TYLENOL) 325 MG tablet Take 2 tablets (650 mg) by mouth every 4 hours as needed for mild pain (multimodal surgical pain management along with NSAIDS and opioid medication as indicated based on pain control and physical function.)   albuterol (PROAIR HFA/PROVENTIL HFA/VENTOLIN HFA) 108 (90 Base) MCG/ACT inhaler Inhale 2 puffs into the lungs every 4 hours as needed for shortness of breath / dyspnea or wheezing   amLODIPine (NORVASC) 10 MG tablet Take 1 tablet (10 mg) by mouth daily   ASPIRIN 81 MG OR TABS Take 1 tablet (81 mg) by mouth at bedtime   biotin (BIOTIN 5000) 5 MG CAPS Take 5 mg by mouth daily   blood glucose monitoring (ACCU-CHEK FASTCLIX) lancets Use to test blood sugar 2-3 times daily or as directed.  Ok to substitute alternative if insurance prefers.   blood glucose monitoring (NO BRAND SPECIFIED) test strip Use to test blood sugar 2-3 times daily or as directed.   fluticasone (FLONASE) 50 MCG/ACT spray Spray 1-2 sprays into both nostrils  daily   glucagon 1 MG SOLR injection Inject 1 mg Subcutaneous every 15 minutes as needed for low blood sugar (May repeat x 1 only)   glucose 40 % (400 mg/mL) GEL gel Take 15-30 g by mouth every 15 minutes as needed for low blood sugar   hydrALAZINE (APRESOLINE) 100 MG TABS tablet Take 1 tablet (100 mg) by mouth every 8 hours   insulin aspart (NOVOLOG PEN) 100 UNIT/ML injection Inject 1-6 Units Subcutaneous 3 times daily (with meals)   insulin aspart (NOVOLOG PEN) 100 UNIT/ML injection Inject 1-5 Units Subcutaneous At Bedtime   insulin glargine (LANTUS SOLOSTAR) 100 UNIT/ML pen Inject 16 Units Subcutaneous every morning (before breakfast)   lisinopril (PRINIVIL/ZESTRIL) 2.5 MG tablet Take 1 tablet (2.5 mg) by mouth daily   loratadine (CLARITIN) 10 MG tablet Take 10 mg by mouth daily Reported on 5/3/2017   melatonin 1 MG TABS tablet Take 2 tablets (2 mg) by mouth nightly as needed for sleep   NEPHROCAPS 1 MG capsule Take 1 capsule by mouth daily   nystatin (MYCOSTATIN) 299735 UNIT/ML suspension Swish and swallow 10 mLs (1,000,000 Units) in mouth 4 times daily   order for DME Equipment being ordered: Carol ()Treatment Diagnosis: ESRD on PDPt has to be connected to PD all night and can not be disconnected, hence impending his mobility to go to the bathroom. At risk for infection if he does not have this equipment. (Patient not taking: Reported on 7/26/2018)   pantoprazole (PROTONIX) 40 MG EC tablet Take 1 tablet (40 mg) by mouth every morning   [START ON 9/14/2018] pentamidine (NEBUPENT) 300 MG neb solution Inhale 300 mg into the lungs once for 1 dose   polyethylene glycol (MIRALAX/GLYCOLAX) Packet Take 17 g by mouth daily as needed for constipation   predniSONE (DELTASONE) 5 MG tablet Take 1 tablet (5 mg) by mouth 2 times daily (with meals)   rosuvastatin (CRESTOR) 20 MG tablet Take 1 tablet (20 mg) by mouth daily   simethicone (MYLICON) 80 MG chewable tablet Take 1 tablet (80 mg) by mouth every 6 hours as  needed for cramping   sodium chloride, PF, 0.9% PF flush 3 mLs by Intracatheter route every hour as needed for line flush (for peripheral IV flush post IV meds)   sodium chloride, PF, 0.9% PF flush 3 mLs by Intracatheter route every 8 hours   tacrolimus (GENERIC EQUIVALENT) 1 MG capsule Take 2 capsules (2 mg) by mouth 2 times daily   traMADol (ULTRAM) 50 MG tablet Take 1 tablet (50 mg) by mouth every 12 hours as needed for moderate pain   triamcinolone (KENALOG) 0.1 % ointment Apply topically 2 times daily   Urea 40 % CREA Externally apply topically daily            Allergies:     Allergies   Allergen Reactions     Norco [Hydrocodone-Acetaminophen] Nausea and Vomiting     Cats      Throat tightness     Isosorbide Other (See Comments)     hypotension     Penicillins Hives     Seasonal Allergies      rhinitis     Shrimp      Throat closes              Labs:     CMP  Recent Labs  Lab 09/10/18  0640 09/09/18  0636 09/08/18  0612 09/07/18  1819 09/07/18  0534    135 134  --  136   POTASSIUM 3.7 3.8 3.8  --  3.9   CHLORIDE 100 98 98  --  99   CO2 29 29 30  --  31   ANIONGAP 8 8 7  --  6   * 78 110*  --  78   BUN 20 12 20  --  12   CR 4.50* 3.35* 4.61*  --  3.49*   GFRESTIMATED 13* 19* 13*  --  18*   GFRESTBLACK 16* 23* 16*  --  22*   TRINI 7.6* 7.9* 8.0*  --  8.0*   MAG 1.8  --   --  2.6* 1.5*   PHOS 2.4*  --   --   --  1.7*   PROTTOTAL 5.0*  --   --   --   --    ALBUMIN 2.0*  --   --   --   --    BILITOTAL 0.5  --   --   --   --    ALKPHOS 141  --   --   --   --    AST 14  --   --   --   --    ALT 14  --   --   --   --      CBC  Recent Labs  Lab 09/11/18  1342 09/10/18  0640 09/09/18  0636 09/08/18  0612   WBC 1.2* 1.2* 1.0* 1.0*   RBC 2.57* 2.48* 2.61* 2.65*   HGB 8.2* 7.9* 8.3* 8.3*   HCT 26.5* 25.0* 26.8* 26.3*   * 101* 103* 99   MCH 31.9 31.9 31.8 31.3   MCHC 30.9* 31.6 31.0* 31.6   RDW 22.1* 21.8* 21.9* 21.5*    160 167 139*     INR  Recent Labs  Lab 09/10/18  0640   INR 1.05        Leonid  SPENCER Muller  Internal Medicine Hospitalist   [facility]  [phone]

## 2018-09-12 NOTE — DISCHARGE SUMMARY
Dialysis Discharge Summary Brief    [facility]  Division of Nephrology  Nephrology Discharge Dialysis Orders  Ph: [phone]  Fax: [phone]    Murray Nicholson  MRN: [id number]  YOB: 1955    [facility] Dialysis Unit: [place]  Primary Nephrologist:     Date of Admission: 8/12/2018  Date of Discharge: 9/11/2018    Please see faxed H&P and Discharge Summary for full details of admission. The patient was last dialyzed on Tues 9/11 prior to discharging to  TCU. Please page me at  with any questions. Thanks much. 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         ESRD - Etiology of ESRD 2/2 HTN and diabetes (heart failure worsened after ESRD dx so unlikely CRS as etiology of ESRD)  Has chronic OP HD at [facility], TTS schedule, under care of Dr Mcpherson. Right TDC, EDW increased to 79.5 kg  - listed for kidney transplant with wait time dating from 3/24/16 - currently inactive as too ill to undergo kidney transplantation surgery, he will not get organ offers but he will continue to accrue wait time      Volume status: EDW 79.5 kg. Though he has edema, his RHC 9/5 with RA 4 and wedge 10   -  EDW 79.5 kg based on RHC from 9/5 (discussed with cardiology, ideally RA of 7; will continue with current EDW for now)      Hypertension: 130-150's  - Per cards, on hydralazine 100 mg qid, amlodipine 10 mg qday      Transplant IS regimen:  Per cardiology: Cellcept stopped (due to leukopenia), prednisone 5 mg bid, tacrolimus 2 mg bid. Tac level pending. Goal-6-8. Heart bx  on 9/5 negative for rejection      BMD: Ca 7.6, alb 2.0, phos 2.4       GIB: resolved     Anemia: hgb 7.9; last transfusion 8/29.    - continue epogen to 10,000 units      Pericolonic abscess, small perircal abscess: Underwent I/D anorectal abscess on 8/13 and  Sigmoidectomy/end colostomy on 8/14.       C diff: on PO vanc completed 9/6      Lung nodules - New Right lower lobe pulmonary nodules seen on CT this admission. ID recommending close f/u.  Has repeat CT 9/16.         [x] Resume all previous dialysis orders with exception as noted below    New Orders (if not applicable put NA):  Estimated Dry Weight 97.5 kg    Dialysis Duration    Dialysis Access    Antibiotics (dose per dialysis, end date)            Labs to be drawn at dialysis    Other major changes to dialysis prescription (e.g. Dialysate bath, heparin, blood flow rate, etc)   Ok to use low dose heparin (GIB is resolved)   Medication changes (also fax the unit a copy of the discharge summary)         Please restart Vit D analogues per your protocol  Epogen 10,000 units per HD  BP meds: hydralazine 100 mg qid, amlodipine 10 mg qday     Name of physician completing this form: Kristi Armas PA-C

## 2018-09-12 NOTE — PLAN OF CARE
Problem: Goal Outcome Summary  Goal: Goal Outcome Summary  Outcome: Therapy, progress toward functional goals as expected  OT Evaluation completed and one treatment only initiated - set up with UE HEP using therabands and handout - also issued foam blocks for hand strengthening exercises until his friend brings in his sqeeze balls. Patient does no require any skilled OT needs at this time, no AE/AD for home at this time. Will have PT for increased proximal strengthening - has 24-26 stairs at home to access his apartment. Has supportive family and friends to assist also.

## 2018-09-12 NOTE — PROGRESS NOTES
Social Work: Initial Assessment with Discharge Plan    Patient Name: Murray Nicholson  : 1955  Age: 63 year old  MRN: [id number]  Completed assessment with: Pt  Admitted to TCU: 2018    Presenting Information   Date of SW assessment: 2018  Health Care Directive: Copy in Chart  Primary Health Care Agent: Self  Secondary Health Care Agent: Son Jasper would be NOK   Living Situation: Pt lives alone in an apartment   Previous Functional Status: Independent and working prior to hospitalization.   DME available: See therapy notes   Patient and family understanding of hospitalization: pt stated that he was sent to TCU for therapy.   Cultural/Language/Spiritual Considerations: English speaking.   Abuse concerns: No concerns presented   BIMS: Pt scored 15 on BIMS indicating cognitively intact.   PHQ-9: Pt scored 00 on PHQ-9 indicating no depressive symptoms.   PAS: confirmation number- [id number]  Has there been a level II screen?  No  Were there any recommendations in the screen? N/A  If yes, will the recommendations we incorporated into the Plan of Care?  N/A  Physical Health  Reason for admission: Bacteremia due to Pseudomonas    Provider Information   Primary Care Physician:Yahir Turcios   : LUIS    Mental Health:   Diagnosis: No concerns per pt or chart review.   Current Support/Services: NA  Previous Services: NA  Services Needed/Recommended: NA    Substance Use:  Diagnosis: No concerns per pt or chart review.   Current Support/Services: NA  Previous Services: NA  Services Needed/Recommended: NA    Support System  Marital Status: Legally    Family support: Pt has the support of his son Jasper  Other support available: Friends  Gaps in support system: No apparent gaps presented.     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   Income Source: SSDI/LTD  Education: Some College   Financial Concerns:  No concerns presented  Insurance: Medicare and Commercial Other       Discharge Plan   Patient and family discharge goal: Pt stated that if cleared he would like to discharge home on Monday 9/17/2018.   Provided Education on discharge plan: YES  Patient agreeable to discharge plan:  Pt will meet with the doctor and will go forward with plan of care.   A list of Medicare Certified Facilities was provided to the patient and/or family to encourage patient choice. Based on location and rating, patient would like referrals made to: NA  General information regarding anticipated insurance coverage and possible out of pocket cost was discussed. Patient and patient's family are aware patient may incur the cost of transportation to the facility, pending insurance payment: YES  Barriers to discharge: Medical clearance, complete therapy goals.     Discharge Recommendations   Disposition: Home with continued support.   Transportation Needs: Family will provide  Name of Transportation Company and Phone: NA    Additional comments   Writer introduced self and role of SW. Pt was pleasant and open to SW services while here on TCU. Pt stated that he is hopeful that he will be able to discharge on Monday 9/17/2018 back home. Pt stated that he is also looking forward to a new kidney at some point as well. Pt stated that he does go to Dialysis and plans to stay with he current location that he is attending. Pt 's friend will be transporting him to and from Dialysis on T/TH/Sat. Pt will be given care plan goals and orders on 9/13/2018 and pt aware and agreeable to this plan. SW will continue to follow and assist as needed.    BROOKLYN Cazares  [facility]   P: [phone]  Pgr: [phone]           09/12/18 1100   Living Arrangements   Lives With alone   Living Arrangements apartment   Able to Return to  Prior Living Arrangements yes   Home Safety   Patient Feels Safe Living in Home? yes   Discharge Planning   Expected Discharge Date 09/17/18  (If medically cleared would like to d/c that day )   Anticipated Discharge Disposition home   Discharge Needs Assessment   Patient/family verbalizes understanding of discharge plan recommendations? Yes   Transportation Available car;family or friend will provide

## 2018-09-13 LAB
GLUCOSE BLDC GLUCOMTR-MCNC: 160 MG/DL (ref 70–99)
GLUCOSE BLDC GLUCOMTR-MCNC: 166 MG/DL (ref 70–99)
GLUCOSE BLDC GLUCOMTR-MCNC: 194 MG/DL (ref 70–99)
GLUCOSE BLDC GLUCOMTR-MCNC: 79 MG/DL (ref 70–99)

## 2018-09-13 PROCEDURE — 25000131 ZZH RX MED GY IP 250 OP 636 PS 637: Mod: GY | Performed by: PHYSICIAN ASSISTANT

## 2018-09-13 PROCEDURE — A9270 NON-COVERED ITEM OR SERVICE: HCPCS | Mod: GY | Performed by: INTERNAL MEDICINE

## 2018-09-13 PROCEDURE — 25000131 ZZH RX MED GY IP 250 OP 636 PS 637: Mod: GY | Performed by: INTERNAL MEDICINE

## 2018-09-13 PROCEDURE — 25000132 ZZH RX MED GY IP 250 OP 250 PS 637: Mod: GY | Performed by: PHYSICIAN ASSISTANT

## 2018-09-13 PROCEDURE — 25000125 ZZHC RX 250: Performed by: INTERNAL MEDICINE

## 2018-09-13 PROCEDURE — 99207 ZZC CDG-MDM COMPONENT: MEETS LOW - DOWN CODED: CPT | Performed by: PHYSICIAN ASSISTANT

## 2018-09-13 PROCEDURE — 12000022 ZZH R&B SNF

## 2018-09-13 PROCEDURE — 99308 SBSQ NF CARE LOW MDM 20: CPT | Performed by: PHYSICIAN ASSISTANT

## 2018-09-13 PROCEDURE — 25000132 ZZH RX MED GY IP 250 OP 250 PS 637: Mod: GY | Performed by: INTERNAL MEDICINE

## 2018-09-13 PROCEDURE — A9270 NON-COVERED ITEM OR SERVICE: HCPCS | Mod: GY | Performed by: PHYSICIAN ASSISTANT

## 2018-09-13 PROCEDURE — 00000146 ZZHCL STATISTIC GLUCOSE BY METER IP

## 2018-09-13 RX ORDER — ANALGESIC BALM 1.74; 4.06 G/29G; G/29G
OINTMENT TOPICAL 3 TIMES DAILY
Status: DISCONTINUED | OUTPATIENT
Start: 2018-09-13 | End: 2018-09-26 | Stop reason: HOSPADM

## 2018-09-13 RX ORDER — MAGNESIUM OXIDE 400 MG/1
400 TABLET ORAL DAILY
Status: DISCONTINUED | OUTPATIENT
Start: 2018-09-13 | End: 2018-09-26 | Stop reason: HOSPADM

## 2018-09-13 RX ADMIN — PREDNISONE 5 MG: 5 TABLET ORAL at 19:40

## 2018-09-13 RX ADMIN — TACROLIMUS 2 MG: 1 CAPSULE ORAL at 22:08

## 2018-09-13 RX ADMIN — NYSTATIN 1000000 UNITS: 100000 SUSPENSION ORAL at 22:57

## 2018-09-13 RX ADMIN — AMLODIPINE BESYLATE 10 MG: 5 TABLET ORAL at 08:06

## 2018-09-13 RX ADMIN — Medication 1 CAPSULE: at 08:05

## 2018-09-13 RX ADMIN — LISINOPRIL 5 MG: 2.5 TABLET ORAL at 08:05

## 2018-09-13 RX ADMIN — PANTOPRAZOLE SODIUM 40 MG: 40 TABLET, DELAYED RELEASE ORAL at 08:06

## 2018-09-13 RX ADMIN — ASPIRIN 81 MG: 81 TABLET, COATED ORAL at 08:06

## 2018-09-13 RX ADMIN — MAGNESIUM OXIDE TAB 400 MG (241.3 MG ELEMENTAL MG) 400 MG: 400 (241.3 MG) TAB at 16:30

## 2018-09-13 RX ADMIN — ANALGESIC BALM: 1.74; 4.06 OINTMENT TOPICAL at 22:06

## 2018-09-13 RX ADMIN — ATORVASTATIN CALCIUM 20 MG: 20 TABLET, FILM COATED ORAL at 22:08

## 2018-09-13 RX ADMIN — HYDRALAZINE HYDROCHLORIDE 100 MG: 50 TABLET ORAL at 06:40

## 2018-09-13 RX ADMIN — INSULIN HUMAN 15 UNITS: 100 INJECTION, SUSPENSION SUBCUTANEOUS at 08:07

## 2018-09-13 RX ADMIN — NYSTATIN 1000000 UNITS: 100000 SUSPENSION ORAL at 08:06

## 2018-09-13 RX ADMIN — INSULIN ASPART 1 UNITS: 100 INJECTION, SOLUTION INTRAVENOUS; SUBCUTANEOUS at 19:42

## 2018-09-13 RX ADMIN — PREDNISONE 5 MG: 5 TABLET ORAL at 08:06

## 2018-09-13 RX ADMIN — TACROLIMUS 2 MG: 1 CAPSULE ORAL at 08:06

## 2018-09-13 RX ADMIN — ANALGESIC BALM: 1.74; 4.06 OINTMENT TOPICAL at 16:31

## 2018-09-13 RX ADMIN — HYDRALAZINE HYDROCHLORIDE 100 MG: 50 TABLET ORAL at 22:06

## 2018-09-13 RX ADMIN — NYSTATIN 1000000 UNITS: 100000 SUSPENSION ORAL at 16:30

## 2018-09-13 NOTE — H&P
Brief medicine note:  - I have reviewed the H&P by Dr. Schuler dated 9/12.       Leonid Muller PA-C  Internal Medicine Hospitalist   [facility]  [phone]

## 2018-09-13 NOTE — PROGRESS NOTES
CLINICAL NUTRITION SERVICES - ASSESSMENT NOTE     Nutrition Prescription    RECOMMENDATIONS FOR MDs/PROVIDERS TO ORDER:  None today    Malnutrition Status:    Unable to assess    Recommendations already ordered by Registered Dietitian (RD):  - Boost Plus (shweta) q HS (per previous admission orders)    Future/Additional Recommendations:  1. If calorie counts show pt is meeting <50% of nutritional needs (1000 kcal, 52 g protein), consider nutrition support.   2. Assess if pt needs calcium/vitamin D, if remains on prednisone. Per nephrology post-op Tx, pt was getting calcium during dialysis and calcitriol during runs.   3. If TFs are needed, see nutrition note 8/24 for recs. If parenteral nutrition (PN) is needed, see nutrition assessment note 8/13 for central PN recs via a central line, to provide 100% of nutrition needs at goal vs restart recent peripheral PN regimen.  4. Adjust supplement frequency as needed pending pt tolerance - consider Nepro.       REASON FOR ASSESSMENT  Murray Nicholson is a/an 63 year old male assessed by the dietitian for Provider Order - Non-severe malnutrition    NUTRITION HISTORY  - Pt is off unit at HD during attempted visit. Will not return until late afternoon/evening.  - Pt was previously at [facility] from 8/12-9/11, initially with poor PO intakes, however have been improving over the last ~1-2 weeks, consuming on average 75% of meals. He also sips on 1 shweta Boost Plus daily. Previous calorie counts show three-day average (9/1-9/3) of 1299 kcals and 38 g protein daily. This met 65% energy and 37% protein needs.   - Pt was receiving PPN from 8/19-8/23 to meet ~40% of nutritional needs.    - Pt was previously following a low fiber diet for 6-8 weeks post op with diet education completed.     CURRENT NUTRITION ORDERS  Diet: Moderate Consistent Carbohydrate  Intake/Tolerance: no intakes documented in flowsheet.     LABS  Labs reviewed  - Phos 2.4    MEDICATIONS  Medications reviewed  -  "Novolog sliding scale insulin  - Humulin q AM and dinner  - Nephrocaps    ANTHROPOMETRICS  Ht Readings from Last 1 Encounters:   08/12/18 1.753 m (5' 9\")   Most Recent Weight: 80.4 kg (177 lb 3.2 oz)    IBW: 72.7 kg (111% IBW)  BMI: Overweight BMI 25-29.9  Weight History: pt has gained 10 lbs over the last 1 month, likely d/t fluid shifts surrounding HD.   Wt Readings from Last 20 Encounters:   09/12/18 80.4 kg (177 lb 3.2 oz)   09/11/18 80 kg (176 lb 6.4 oz)   08/10/18 75.8 kg (167 lb)   08/10/18 75.8 kg (167 lb)   08/06/18 77.2 kg (170 lb 3.2 oz)   07/26/18 77.7 kg (171 lb 4.8 oz)   07/20/18 78.1 kg (172 lb 1.6 oz)   07/10/18 79.9 kg (176 lb 1.6 oz)   07/06/18 80.3 kg (177 lb)   07/05/18 77.8 kg (171 lb 8.3 oz)   07/02/18 78.4 kg (172 lb 12.8 oz)   06/21/18 76.3 kg (168 lb 4.8 oz)   04/16/18 76.9 kg (169 lb 9.6 oz)   04/10/18 76.7 kg (169 lb)   04/05/18 74.8 kg (164 lb 14.4 oz)   04/04/18 75.4 kg (166 lb 3.2 oz)   03/22/18 74.8 kg (165 lb)   03/16/18 76.4 kg (168 lb 8 oz)   03/12/18 78 kg (172 lb)   03/07/18 76.2 kg (168 lb)     Dosing Weight: 80 kg - admit wt    ASSESSED NUTRITION NEEDS  Estimated Energy Needs: 7163-6921 kcals/day (25 - 30 kcals/kg )  Justification: Dialysis  Estimated Protein Needs: 104-144 grams protein/day (1.3 - 1.8 grams of pro/kg)  Justification: Dialysis  Estimated Fluid Needs: 1 mL/kcal  Justification: Per provider pending fluid status    PHYSICAL FINDINGS  See malnutrition section below.  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  - S/p heart transplant on 6/14/18    MALNUTRITION  % Intake: < 75% for > 7 days (non-severe)  % Weight Loss: None noted  Subcutaneous Fat Loss: Unable to assess  Muscle Loss: Unable to assess  Fluid Accumulation/Edema: Does not meet criteria  Malnutrition Diagnosis: Unable to determine    Subcutaneous Fat Loss: Facial region:  mild  Muscle Loss: Temporal, Scapular " bone and Thoracic region (clavicle, acromium bone, deltoid, trapezius, pectoral): mild, Posterior calf: Moderate    NUTRITION DIAGNOSIS  Inadequate protein-energy intake related to fair appetite and increased needs as evidenced by pt most recently meeting 65% energy and 37% protein needs    INTERVENTIONS  Implementation  Nutrition Education: Unable to complete   Medical food supplement therapy     Goals  Total avg nutritional intake to meet a minimum of 25 kcal/kg and 1.3 g PRO/kg daily (per dosing wt 80 kg).     Monitoring/Evaluation  Progress toward goals will be monitored and evaluated per protocol.      Henna Molina RD, LD  Unit Pager: [phone]

## 2018-09-13 NOTE — PLAN OF CARE
Problem: Goal Outcome Summary  Goal: Goal Outcome Summary   came to nurse's station and said while patient was outside waiting for his ride to dialysis, he fell. Security  said he did not hit his head and he did not notice any type of injury and he helped him into the car. MAYTE Jaquez, notified of incident.

## 2018-09-13 NOTE — PHARMACY-TCU REVIEW OF H&P
I have reviewed this patient's TCU admission History & Physical for medication related changes/recommendations identified by the admitting provider.  I am confirming that there are no recommendations requiring changes to medication orders are indicated at this time based on the provider recommendations in the H&P.    Talked with MAYTE Brooke:  1. Mycophenolate and Valcyte were both discontinued prior to transfer to TCU.  2. EPO is being given with dialysis at outside dialysis center.

## 2018-09-13 NOTE — PROGRESS NOTES
Dialysis notified while on run events of fall relayed to us by security. Dialysis called back to unit stating patient was fine, and no further complaints of pain or issues related to fall.  Discussion with patient from dialysis team that patient needs to have transport friend bring patient back up to unit when he returns.  Patient stated understanding and did return to the unit with his friend.  No further issues noted. Continue plan of care.  Discussion with patient when he returned about maybe moving to a room closer to the desk with patient declining this at this time.

## 2018-09-13 NOTE — PHARMACY-MEDICATION REGIMEN REVIEW
Pharmacy Medication Regimen Review  Murray Nicholson is a 63 year old male who is currently in the Transitional Care Unit.    Assessment: Upon review of the medications and patient chart no irregularities were found.    Plan:   Continue current medications.  Attending provider will be sent this note for review.  If there are any emergent issues noted above, pharmacist will contact provider directly by phone.      Pharmacy will periodically review the resident's medication regimen for any PRN medications not administered in > 72 hours and discontinue them. The pharmacist will discuss gradual dose reductions of psychopharmacologic medications with interdisciplinary team on a regular basis.    Please contact pharmacy if the above does not answer specific medication questions/concerns.    Background:  A pharmacist has reviewed all medications and pertinent medical history today.  Medications were reviewed for appropriate use and any irregularities found are listed with recommendations.      Current Facility-Administered Medications:      [START ON 9/14/2018] - Skin Test Reading -, , Does not apply, Q21 Days, Wing Caruso MD     acetaminophen (TYLENOL) Suppository 650 mg, 650 mg, Rectal, Q4H PRN, Wing Caruso MD     acetaminophen (TYLENOL) tablet 650 mg, 650 mg, Oral, Q4H PRN, Wing Caruso MD     albuterol (PROAIR HFA/PROVENTIL HFA/VENTOLIN HFA) 108 (90 Base) MCG/ACT inhaler 2 puff, 2 puff, Inhalation, Q4H PRN, Wing Caruso MD     alum & mag hydroxide-simethicone (MYLANTA ES/MAALOX  ES) suspension 15 mL, 15 mL, Oral, Q4H PRN, Wing Caruso MD     amLODIPine (NORVASC) tablet 10 mg, 10 mg, Oral, Daily, Wing Caruso MD, 10 mg at 09/13/18 0806     aspirin EC tablet 81 mg, 81 mg, Oral, Daily, Wing Caruso MD, 81 mg at 09/13/18 0806     atorvastatin (LIPITOR) tablet 20 mg, 20 mg, Oral, QPM, Faye Schuler  MD Wing, 20 mg at 09/12/18 2105     benzocaine-menthol (CEPACOL) 15-3.6 MG lozenge 1 lozenge, 1 lozenge, Buccal, Q2H PRN, Wing Caruso MD     glucose gel 15-30 g, 15-30 g, Oral, Q15 Min PRN **OR** dextrose 50 % injection 25-50 mL, 25-50 mL, Intravenous, Q15 Min PRN **OR** glucagon injection 1 mg, 1 mg, Subcutaneous, Q15 Min PRN, Wing Caruso MD     eucerin cream, , Topical, Q1H PRN, Wing Caruso MD     hydrALAZINE (APRESOLINE) tablet 100 mg, 100 mg, Oral, Q8H JEAN, Wing Caruso MD, 100 mg at 09/13/18 0640     insulin aspart (NovoLOG) inj (RAPID ACTING), 1-7 Units, Subcutaneous, TID AC, Wing Caruso MD, 1 Units at 09/12/18 1838     insulin aspart (NovoLOG) inj (RAPID ACTING), 1-5 Units, Subcutaneous, At Bedtime, Wing Caruso MD     insulin isophane human (HumuLIN N PEN) injection 15 Units, 15 Units, Subcutaneous, QAM AC, Noel Muller PA-C, 15 Units at 09/13/18 0807     insulin isophane human (HumuLIN N PEN) injection 8 Units, 8 Units, Subcutaneous, Daily with supper, Noel Muller PA-C     lisinopril (PRINIVIL/Zestril) tablet 5 mg, 5 mg, Oral, Daily, Noel Muller PA-C, 5 mg at 09/13/18 0805     medication instructions, , Does not apply, Continuous PRN, Wing Caruso MD     naloxone (NARCAN) injection 0.1-0.4 mg, 0.1-0.4 mg, Intravenous, Q2 Min PRN, Penny Navarro MD     NEPHROCAPS capsule 1 capsule, 1 capsule, Oral, Daily, Wing Caruso MD, 1 capsule at 09/13/18 0805     nystatin (MYCOSTATIN) suspension 1,000,000 Units, 1,000,000 Units, Swish & Swallow, 4x Daily, Wing Caruso MD, 1,000,000 Units at 09/13/18 0806     ondansetron (ZOFRAN-ODT) ODT tab 4 mg, 4 mg, Oral, Q6H PRN **OR** ondansetron (ZOFRAN) injection 4 mg, 4 mg, Intravenous, Q6H PRN, Wing Caruso MD     pantoprazole (PROTONIX) EC tablet 40 mg, 40  mg, Oral, QAM, Wing Caruso MD, 40 mg at 09/13/18 0806     predniSONE (DELTASONE) tablet 5 mg, 5 mg, Oral, BID w/meals, Wing Caruso MD, 5 mg at 09/13/18 0806     sennosides (SENOKOT) tablet 1-2 tablet, 1-2 tablet, Oral, BID PRN, Wing Caruso MD     tacrolimus (GENERIC EQUIVALENT) capsule 2 mg, 2 mg, Oral, BID, Wing Caruso MD, 2 mg at 09/13/18 0806     traMADol (ULTRAM) half-tab 25-50 mg, 25-50 mg, Oral, Q12H PRN, Wing Caruso MD     tuberculin injection 5 Units, 5 Units, Intradermal, Q21 Days, Wing Caruso MD, 5 Units at 09/12/18 0813  No current outpatient prescriptions on file.   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

## 2018-09-13 NOTE — PROGRESS NOTES
"Received notification from CN at 9:30am that patient had fall outside of building when waiting for his dialysis ride.  Security came up to unit to notify CN that they found patient down and his \"knees buckled\" per security report.  Patient was fine per security and car ride had then just shown up.  Security then stated he got in car without any incident.  Dialysis notified (he has not arrived yet to run) and spoke with RN who has worked with patient in the past.  Patient per dialysis RN states that generally he is pretty steady- \"walks in and walks out just fine.\"  I requested dialysis RN to call this author with an update in terms of his status- upon leaving here per security, no issues noted with injury.  Dialysis RN stated she would call with follow up information when he arrives and is on his run.  Will discuss with family/ride to dialysis to come pick patient up on unit for dialysis runs.  Patient dialysis today is at 10:15 am.  Continue plan of care.  May move patient room closer to the desk. Patient also refusing alarms at this time.  "

## 2018-09-13 NOTE — PLAN OF CARE
Problem: Goal Outcome Summary  Goal: Goal Outcome Summary  Outcome: No Change  Alert and oriented x4. Able to verbalize needs. No complain of pain, no SOB. At 2200 patient turned on the call light. Staff went to anser the call light and observed patient sitting on the floor next to toilet. He stated that his legs got weak and ended up sliding from the toilet and sat himself on the floor. He denies hitting his head. Patient was assisted off the floor with assist of two staff. Writer assessed patient, no injuries noted. Patient declined bed alarm but agreed to call staff for assistance. Patient took a shower tonight. Colostomy pouch intact, he is able to empty pouch himself with minimal assistance from staff tonight. BG this shift 145 and 154.

## 2018-09-13 NOTE — PLAN OF CARE
Problem: Goal Outcome Summary  Goal: Goal Outcome Summary  Outcome: No Change  Patient not available @ this time,he left for dialysis this morning  Around 9 am.Patient told the HUC @ the desk that he was leaving,got into elevator and left.A few minutes later,floor was called by security person that the patient had fallen outside but was assisted into the car and  left.PA/charge nurse/DON all are aware of the incident.This morning patient triggered sepsis protocol but declined   lactic blood draw,he stated it had been negative in the past.Patient was  informed that he has a right to refuse cares.Action plan will be (1) Patient should not leave the unit to go to dialysis unacompanied,(2)  transport will let the unit know when they get here so that the patient can be assisted to get outside,(3) patient will be moved to a room close to   when available.

## 2018-09-13 NOTE — PLAN OF CARE
"Problem: Goal Outcome Summary  Goal: Goal Outcome Summary  Patient alert and oriented x 4. BG-160.  VS Blood pressure (!) 155/91, pulse 96, temperature 96.6  F (35.9  C), temperature source Oral, resp. rate 18, weight 80.4 kg (177 lb 3.2 oz), SpO2 100 %.  Patient declined eye to be checked for neuro checks got upset stated \"if you want you can check me @ 0700\". Patient doesn't want to be bother @ night.  BP elevated, patient asymptomatic- MD updated and notified (Dr. Del Real) no new order. Will continue to monitor patient's status.    0757: Patient VS Blood pressure (!) 152/91, pulse 96, temperature 97.9  F (36.6  C), temperature source Oral, resp. rate 18, weight 80.4 kg (177 lb 3.2 oz), SpO2 99 %.  Sepsis protocol triggered-lactic stat ordered. Reported to bedside and charge RN. Will continue with current plan of care.  "

## 2018-09-14 ENCOUNTER — MYC MEDICAL ADVICE (OUTPATIENT)
Dept: DERMATOLOGY | Facility: CLINIC | Age: 63
End: 2018-09-14

## 2018-09-14 LAB
ANION GAP SERPL CALCULATED.3IONS-SCNC: 10 MMOL/L (ref 3–14)
ANISOCYTOSIS BLD QL SMEAR: ABNORMAL
BASOPHILS # BLD AUTO: 0 10E9/L (ref 0–0.2)
BASOPHILS NFR BLD AUTO: 0.4 %
BUN SERPL-MCNC: 13 MG/DL (ref 7–30)
CALCIUM SERPL-MCNC: 8.2 MG/DL (ref 8.5–10.1)
CHLORIDE SERPL-SCNC: 101 MMOL/L (ref 94–109)
CO2 SERPL-SCNC: 28 MMOL/L (ref 20–32)
CREAT SERPL-MCNC: 3.47 MG/DL (ref 0.66–1.25)
DIFFERENTIAL METHOD BLD: ABNORMAL
EOSINOPHIL # BLD AUTO: 0 10E9/L (ref 0–0.7)
EOSINOPHIL NFR BLD AUTO: 0.9 %
ERYTHROCYTE [DISTWIDTH] IN BLOOD BY AUTOMATED COUNT: 21.6 % (ref 10–15)
GFR SERPL CREATININE-BSD FRML MDRD: 18 ML/MIN/1.7M2
GLUCOSE BLDC GLUCOMTR-MCNC: 162 MG/DL (ref 70–99)
GLUCOSE BLDC GLUCOMTR-MCNC: 276 MG/DL (ref 70–99)
GLUCOSE SERPL-MCNC: 148 MG/DL (ref 70–99)
HCT VFR BLD AUTO: 32.1 % (ref 40–53)
HGB BLD-MCNC: 10.1 G/DL (ref 13.3–17.7)
IMM GRANULOCYTES # BLD: 0 10E9/L (ref 0–0.4)
IMM GRANULOCYTES NFR BLD: 0.9 %
LYMPHOCYTES # BLD AUTO: 0.5 10E9/L (ref 0.8–5.3)
LYMPHOCYTES NFR BLD AUTO: 19.7 %
MACROCYTES BLD QL SMEAR: PRESENT
MAGNESIUM SERPL-MCNC: 1.6 MG/DL (ref 1.6–2.3)
MCH RBC QN AUTO: 32.2 PG (ref 26.5–33)
MCHC RBC AUTO-ENTMCNC: 31.5 G/DL (ref 31.5–36.5)
MCV RBC AUTO: 102 FL (ref 78–100)
MONOCYTES # BLD AUTO: 0.5 10E9/L (ref 0–1.3)
MONOCYTES NFR BLD AUTO: 21.5 %
NEUTROPHILS # BLD AUTO: 1.3 10E9/L (ref 1.6–8.3)
NEUTROPHILS NFR BLD AUTO: 56.6 %
NRBC # BLD AUTO: 0 10*3/UL
NRBC BLD AUTO-RTO: 1 /100
PLATELET # BLD AUTO: 247 10E9/L (ref 150–450)
PLATELET # BLD EST: ABNORMAL 10*3/UL
POLYCHROMASIA BLD QL SMEAR: SLIGHT
POTASSIUM SERPL-SCNC: 3.8 MMOL/L (ref 3.4–5.3)
RBC # BLD AUTO: 3.14 10E12/L (ref 4.4–5.9)
SODIUM SERPL-SCNC: 139 MMOL/L (ref 133–144)
TACROLIMUS BLD-MCNC: 6 UG/L (ref 5–15)
TME LAST DOSE: NORMAL H
WBC # BLD AUTO: 2.2 10E9/L (ref 4–11)

## 2018-09-14 PROCEDURE — 25000132 ZZH RX MED GY IP 250 OP 250 PS 637: Mod: GY | Performed by: INTERNAL MEDICINE

## 2018-09-14 PROCEDURE — 40000901 ZZH STATISTIC WOC PT EDUCATION, 0-15 MIN

## 2018-09-14 PROCEDURE — A9270 NON-COVERED ITEM OR SERVICE: HCPCS | Mod: GY | Performed by: PHYSICIAN ASSISTANT

## 2018-09-14 PROCEDURE — 25000132 ZZH RX MED GY IP 250 OP 250 PS 637: Mod: GY | Performed by: PHYSICIAN ASSISTANT

## 2018-09-14 PROCEDURE — 40000193 ZZH STATISTIC PT WARD VISIT: Performed by: PHYSICAL THERAPIST

## 2018-09-14 PROCEDURE — 36415 COLL VENOUS BLD VENIPUNCTURE: CPT | Performed by: PHYSICIAN ASSISTANT

## 2018-09-14 PROCEDURE — 12000022 ZZH R&B SNF

## 2018-09-14 PROCEDURE — 85004 AUTOMATED DIFF WBC COUNT: CPT | Performed by: PHYSICIAN ASSISTANT

## 2018-09-14 PROCEDURE — 25000125 ZZHC RX 250: Performed by: INTERNAL MEDICINE

## 2018-09-14 PROCEDURE — 25000131 ZZH RX MED GY IP 250 OP 636 PS 637: Mod: GY | Performed by: INTERNAL MEDICINE

## 2018-09-14 PROCEDURE — 85027 COMPLETE CBC AUTOMATED: CPT | Performed by: PHYSICIAN ASSISTANT

## 2018-09-14 PROCEDURE — 00000146 ZZHCL STATISTIC GLUCOSE BY METER IP

## 2018-09-14 PROCEDURE — 25000128 H RX IP 250 OP 636

## 2018-09-14 PROCEDURE — 80048 BASIC METABOLIC PNL TOTAL CA: CPT | Performed by: PHYSICIAN ASSISTANT

## 2018-09-14 PROCEDURE — A9270 NON-COVERED ITEM OR SERVICE: HCPCS | Mod: GY | Performed by: INTERNAL MEDICINE

## 2018-09-14 PROCEDURE — 97110 THERAPEUTIC EXERCISES: CPT | Mod: GP | Performed by: PHYSICAL THERAPIST

## 2018-09-14 PROCEDURE — 83735 ASSAY OF MAGNESIUM: CPT | Performed by: PHYSICIAN ASSISTANT

## 2018-09-14 PROCEDURE — 97116 GAIT TRAINING THERAPY: CPT | Mod: GP | Performed by: PHYSICAL THERAPIST

## 2018-09-14 PROCEDURE — 80197 ASSAY OF TACROLIMUS: CPT | Performed by: PHYSICIAN ASSISTANT

## 2018-09-14 RX ORDER — MAGNESIUM SULFATE HEPTAHYDRATE 40 MG/ML
4 INJECTION, SOLUTION INTRAVENOUS EVERY 4 HOURS PRN
Status: DISCONTINUED | OUTPATIENT
Start: 2018-09-14 | End: 2018-09-26 | Stop reason: HOSPADM

## 2018-09-14 RX ORDER — MAGNESIUM SULFATE HEPTAHYDRATE 40 MG/ML
2 INJECTION, SOLUTION INTRAVENOUS DAILY PRN
Status: DISCONTINUED | OUTPATIENT
Start: 2018-09-14 | End: 2018-09-26 | Stop reason: HOSPADM

## 2018-09-14 RX ADMIN — TACROLIMUS 2 MG: 1 CAPSULE ORAL at 21:54

## 2018-09-14 RX ADMIN — HYDRALAZINE HYDROCHLORIDE 100 MG: 50 TABLET ORAL at 06:50

## 2018-09-14 RX ADMIN — INSULIN HUMAN 15 UNITS: 100 INJECTION, SUSPENSION SUBCUTANEOUS at 08:30

## 2018-09-14 RX ADMIN — ANALGESIC BALM: 1.74; 4.06 OINTMENT TOPICAL at 12:04

## 2018-09-14 RX ADMIN — NYSTATIN 1000000 UNITS: 100000 SUSPENSION ORAL at 08:34

## 2018-09-14 RX ADMIN — MAGNESIUM OXIDE TAB 400 MG (241.3 MG ELEMENTAL MG) 400 MG: 400 (241.3 MG) TAB at 08:34

## 2018-09-14 RX ADMIN — MAGNESIUM SULFATE IN WATER 2 G: 40 INJECTION, SOLUTION INTRAVENOUS at 17:00

## 2018-09-14 RX ADMIN — PANTOPRAZOLE SODIUM 40 MG: 40 TABLET, DELAYED RELEASE ORAL at 08:33

## 2018-09-14 RX ADMIN — PREDNISONE 5 MG: 5 TABLET ORAL at 17:05

## 2018-09-14 RX ADMIN — INSULIN ASPART 1 UNITS: 100 INJECTION, SOLUTION INTRAVENOUS; SUBCUTANEOUS at 19:35

## 2018-09-14 RX ADMIN — NYSTATIN 1000000 UNITS: 100000 SUSPENSION ORAL at 17:05

## 2018-09-14 RX ADMIN — NYSTATIN 1000000 UNITS: 100000 SUSPENSION ORAL at 21:51

## 2018-09-14 RX ADMIN — LISINOPRIL 5 MG: 2.5 TABLET ORAL at 08:32

## 2018-09-14 RX ADMIN — PREDNISONE 5 MG: 5 TABLET ORAL at 08:33

## 2018-09-14 RX ADMIN — ATORVASTATIN CALCIUM 20 MG: 20 TABLET, FILM COATED ORAL at 21:54

## 2018-09-14 RX ADMIN — HYDRALAZINE HYDROCHLORIDE 100 MG: 50 TABLET ORAL at 21:52

## 2018-09-14 RX ADMIN — TACROLIMUS 2 MG: 1 CAPSULE ORAL at 08:32

## 2018-09-14 RX ADMIN — ANALGESIC BALM: 1.74; 4.06 OINTMENT TOPICAL at 17:06

## 2018-09-14 RX ADMIN — Medication 1 CAPSULE: at 08:33

## 2018-09-14 RX ADMIN — ANALGESIC BALM: 1.74; 4.06 OINTMENT TOPICAL at 21:51

## 2018-09-14 RX ADMIN — ASPIRIN 81 MG: 81 TABLET, COATED ORAL at 08:31

## 2018-09-14 RX ADMIN — HYDRALAZINE HYDROCHLORIDE 100 MG: 50 TABLET ORAL at 13:23

## 2018-09-14 RX ADMIN — AMLODIPINE BESYLATE 10 MG: 5 TABLET ORAL at 08:33

## 2018-09-14 NOTE — PLAN OF CARE
Problem: Goal Outcome Summary  Goal: Goal Outcome Summary  Outcome: Improving  Pt sleeping most of the night.  Pt adamant on not disturbing him tonight.  No complaints of pain or SOB.  Knows when to call for assistance.  Call light within reach.  Will continue to monitor.

## 2018-09-14 NOTE — PROGRESS NOTES
WOC Consult    WOC Consult received from bedside RN overnight for wounds on the toes.  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    Discussed with MAYTE Davis and she confirmed WOC Nursing does not need to assess feet or write orders for wound care on feet. Primary Service will direct cares.    WOC will sign off today.     Talia Hernandez RN, CWOCN

## 2018-09-14 NOTE — PLAN OF CARE
Problem: Goal Outcome Summary  Goal: Goal Outcome Summary  Recommend pt do stretches in bed before starts day to decrease reported stiffness in thighs. Pt interested in massage or acupuncture for LE. SBA amb without  ft. Cont per POC

## 2018-09-14 NOTE — PLAN OF CARE
Problem: Goal Outcome Summary  Goal: Goal Outcome Summary  Pt stable this shift. VSS. BLE sore, using bengay ointment effectively. Pt changed colostomy bag with RN supervision, pt aware of procedure and steps. RN obtained PIV for pt, Magnesium replacement sent from pharmacy. To be given on evening shift. Pt working with therapies today.Continue with POC for pt.

## 2018-09-14 NOTE — PROGRESS NOTES
Calorie Counts    9/13: 0 kcal and 0 g protein (2 meals ordered, none recorded)      Henna Molina RD  Unit Pager: [phone]

## 2018-09-14 NOTE — PLAN OF CARE
Pt is alert and oriented x4. Denies SOB and chest pain during cares. Pt is standby assist with walker in room. In hallways, pt needs a gait belt with staff assistance. Pt walks in room with no walker and when offered, pt refuses. I also offered a gait belt during transfer in the room since pt declined need for walker and pt refused use of gait belt as well. Informed pt re possible room transfer and pt declined indicating that he likes the current room he is in and does not want to change rooms. No complaints of pain.  at 1725 and 194 at 2052; administered insulin per sliding scale orders. Colostomy is intact; colostomy cares done by pt. Administered magnesium per orders. Administered Bengay x2 per orders to back of BLE of which pt req the Bengay to be massaged deeply into muscles. Continue with POC.    Temp: 98.4  F (36.9  C) Temp src: Oral BP: 124/73   Heart Rate: 98 Resp: 18 SpO2: 98 % O2 Device: None (Room air)

## 2018-09-15 LAB
CMV DNA SPEC NAA+PROBE-ACNC: NORMAL [IU]/ML
CMV DNA SPEC NAA+PROBE-LOG#: NORMAL {LOG_IU}/ML
GLUCOSE BLDC GLUCOMTR-MCNC: 103 MG/DL (ref 70–99)
GLUCOSE BLDC GLUCOMTR-MCNC: 104 MG/DL (ref 70–99)
GLUCOSE BLDC GLUCOMTR-MCNC: 112 MG/DL (ref 70–99)
GLUCOSE BLDC GLUCOMTR-MCNC: 137 MG/DL (ref 70–99)
GLUCOSE BLDC GLUCOMTR-MCNC: 55 MG/DL (ref 70–99)
GLUCOSE BLDC GLUCOMTR-MCNC: 63 MG/DL (ref 70–99)
GLUCOSE BLDC GLUCOMTR-MCNC: 73 MG/DL (ref 70–99)
GLUCOSE BLDC GLUCOMTR-MCNC: 92 MG/DL (ref 70–99)
MAGNESIUM SERPL-MCNC: 2.1 MG/DL (ref 1.6–2.3)
SPECIMEN SOURCE: NORMAL

## 2018-09-15 PROCEDURE — A9270 NON-COVERED ITEM OR SERVICE: HCPCS | Mod: GY | Performed by: INTERNAL MEDICINE

## 2018-09-15 PROCEDURE — 36415 COLL VENOUS BLD VENIPUNCTURE: CPT | Performed by: HOSPITALIST

## 2018-09-15 PROCEDURE — 83735 ASSAY OF MAGNESIUM: CPT | Performed by: HOSPITALIST

## 2018-09-15 PROCEDURE — 25000132 ZZH RX MED GY IP 250 OP 250 PS 637: Mod: GY | Performed by: PHYSICIAN ASSISTANT

## 2018-09-15 PROCEDURE — 25000125 ZZHC RX 250: Performed by: INTERNAL MEDICINE

## 2018-09-15 PROCEDURE — 25000131 ZZH RX MED GY IP 250 OP 636 PS 637: Mod: GY | Performed by: INTERNAL MEDICINE

## 2018-09-15 PROCEDURE — 12000022 ZZH R&B SNF

## 2018-09-15 PROCEDURE — 97110 THERAPEUTIC EXERCISES: CPT | Mod: GP | Performed by: PHYSICAL THERAPIST

## 2018-09-15 PROCEDURE — 00000146 ZZHCL STATISTIC GLUCOSE BY METER IP

## 2018-09-15 PROCEDURE — 25000132 ZZH RX MED GY IP 250 OP 250 PS 637: Mod: GY | Performed by: INTERNAL MEDICINE

## 2018-09-15 PROCEDURE — A9270 NON-COVERED ITEM OR SERVICE: HCPCS | Mod: GY | Performed by: PHYSICIAN ASSISTANT

## 2018-09-15 PROCEDURE — 40000193 ZZH STATISTIC PT WARD VISIT: Performed by: PHYSICAL THERAPIST

## 2018-09-15 RX ADMIN — NYSTATIN 1000000 UNITS: 100000 SUSPENSION ORAL at 08:43

## 2018-09-15 RX ADMIN — PREDNISONE 5 MG: 5 TABLET ORAL at 08:43

## 2018-09-15 RX ADMIN — HYDRALAZINE HYDROCHLORIDE 100 MG: 50 TABLET ORAL at 21:25

## 2018-09-15 RX ADMIN — NYSTATIN 1000000 UNITS: 100000 SUSPENSION ORAL at 17:27

## 2018-09-15 RX ADMIN — INSULIN HUMAN 15 UNITS: 100 INJECTION, SUSPENSION SUBCUTANEOUS at 08:46

## 2018-09-15 RX ADMIN — ATORVASTATIN CALCIUM 20 MG: 20 TABLET, FILM COATED ORAL at 21:26

## 2018-09-15 RX ADMIN — AMLODIPINE BESYLATE 10 MG: 5 TABLET ORAL at 08:46

## 2018-09-15 RX ADMIN — LISINOPRIL 5 MG: 2.5 TABLET ORAL at 08:44

## 2018-09-15 RX ADMIN — ASPIRIN 81 MG: 81 TABLET, COATED ORAL at 08:44

## 2018-09-15 RX ADMIN — PREDNISONE 5 MG: 5 TABLET ORAL at 17:27

## 2018-09-15 RX ADMIN — TACROLIMUS 2 MG: 1 CAPSULE ORAL at 08:46

## 2018-09-15 RX ADMIN — NYSTATIN 1000000 UNITS: 100000 SUSPENSION ORAL at 22:19

## 2018-09-15 RX ADMIN — PANTOPRAZOLE SODIUM 40 MG: 40 TABLET, DELAYED RELEASE ORAL at 08:46

## 2018-09-15 RX ADMIN — MAGNESIUM OXIDE TAB 400 MG (241.3 MG ELEMENTAL MG) 400 MG: 400 (241.3 MG) TAB at 08:43

## 2018-09-15 RX ADMIN — Medication 1 CAPSULE: at 08:43

## 2018-09-15 RX ADMIN — NYSTATIN 1000000 UNITS: 100000 SUSPENSION ORAL at 14:56

## 2018-09-15 RX ADMIN — HYDRALAZINE HYDROCHLORIDE 100 MG: 50 TABLET ORAL at 06:55

## 2018-09-15 RX ADMIN — TACROLIMUS 2 MG: 1 CAPSULE ORAL at 21:26

## 2018-09-15 NOTE — PLAN OF CARE
Problem: Goal Outcome Summary  Goal: Goal Outcome Summary  Outcome: Improving  Alert and oriented x4. No complaints of pain overnight. Asleep during rounds-patient requests no to be disturbed during the night. Mod I in the room. Reported no problems with the colostomy pouch. Call light in reach. Continue with current POC.

## 2018-09-15 NOTE — PLAN OF CARE
Problem: Goal Outcome Summary  Goal: Goal Outcome Summary  Outcome: No Change  Patient is alert x 4 and he directs his cares. Patient left for dialysis at 0915. Patient was escorted down by Roshni SMITH and she waited with the patient until he was picked. Patient ate breakfast and had his insulin and medications before he left. Patient didn't want to take his lunch that was packaged for him by dietary. Patient said he would get food on his own. See VS. Patient is up in his room independently using no assistive device. Patient appeared steady on his feet.

## 2018-09-15 NOTE — PLAN OF CARE
Problem: Goal Outcome Summary  Goal: Goal Outcome Summary  Pt feeling LE are loosening up, feeling stronger. No LOB or buckling amb without  ft. Concentrating on abductor and quad strengthening. On track for goals.

## 2018-09-15 NOTE — PROGRESS NOTES
Calorie Counts    9/14: 0 kcal and 0 g protein (no meals/supplements recorded)  9/15: 0 kcal and 0 g protein (no meals/supplements recorded)      Henna Molina RD  Unit Pager: [phone]

## 2018-09-16 LAB
GLUCOSE BLDC GLUCOMTR-MCNC: 114 MG/DL (ref 70–99)
GLUCOSE BLDC GLUCOMTR-MCNC: 156 MG/DL (ref 70–99)
GLUCOSE BLDC GLUCOMTR-MCNC: 186 MG/DL (ref 70–99)
GLUCOSE BLDC GLUCOMTR-MCNC: 65 MG/DL (ref 70–99)
GLUCOSE BLDC GLUCOMTR-MCNC: 69 MG/DL (ref 70–99)
LACTATE BLD-SCNC: 1.3 MMOL/L (ref 0.7–2)

## 2018-09-16 PROCEDURE — A9270 NON-COVERED ITEM OR SERVICE: HCPCS | Mod: GY | Performed by: INTERNAL MEDICINE

## 2018-09-16 PROCEDURE — 36415 COLL VENOUS BLD VENIPUNCTURE: CPT | Performed by: INTERNAL MEDICINE

## 2018-09-16 PROCEDURE — 25000131 ZZH RX MED GY IP 250 OP 636 PS 637: Mod: GY | Performed by: INTERNAL MEDICINE

## 2018-09-16 PROCEDURE — 87040 BLOOD CULTURE FOR BACTERIA: CPT | Performed by: INTERNAL MEDICINE

## 2018-09-16 PROCEDURE — 99207 ZZC CDG-MDM COMPONENT: MEETS MODERATE - UP CODED: CPT | Performed by: PHYSICIAN ASSISTANT

## 2018-09-16 PROCEDURE — A9270 NON-COVERED ITEM OR SERVICE: HCPCS | Mod: GY | Performed by: PHYSICIAN ASSISTANT

## 2018-09-16 PROCEDURE — 12000022 ZZH R&B SNF

## 2018-09-16 PROCEDURE — 83605 ASSAY OF LACTIC ACID: CPT | Performed by: HOSPITALIST

## 2018-09-16 PROCEDURE — 99309 SBSQ NF CARE MODERATE MDM 30: CPT | Performed by: PHYSICIAN ASSISTANT

## 2018-09-16 PROCEDURE — 36415 COLL VENOUS BLD VENIPUNCTURE: CPT | Performed by: HOSPITALIST

## 2018-09-16 PROCEDURE — 25000132 ZZH RX MED GY IP 250 OP 250 PS 637: Mod: GY | Performed by: PHYSICIAN ASSISTANT

## 2018-09-16 PROCEDURE — 25000125 ZZHC RX 250: Performed by: INTERNAL MEDICINE

## 2018-09-16 PROCEDURE — 25000132 ZZH RX MED GY IP 250 OP 250 PS 637: Mod: GY | Performed by: INTERNAL MEDICINE

## 2018-09-16 PROCEDURE — 00000146 ZZHCL STATISTIC GLUCOSE BY METER IP

## 2018-09-16 RX ADMIN — PREDNISONE 5 MG: 5 TABLET ORAL at 17:00

## 2018-09-16 RX ADMIN — HYDRALAZINE HYDROCHLORIDE 100 MG: 50 TABLET ORAL at 06:56

## 2018-09-16 RX ADMIN — Medication 1 CAPSULE: at 08:53

## 2018-09-16 RX ADMIN — NYSTATIN 1000000 UNITS: 100000 SUSPENSION ORAL at 08:52

## 2018-09-16 RX ADMIN — HYDRALAZINE HYDROCHLORIDE 100 MG: 50 TABLET ORAL at 13:32

## 2018-09-16 RX ADMIN — ANALGESIC BALM: 1.74; 4.06 OINTMENT TOPICAL at 21:28

## 2018-09-16 RX ADMIN — LISINOPRIL 5 MG: 2.5 TABLET ORAL at 08:53

## 2018-09-16 RX ADMIN — TACROLIMUS 2 MG: 1 CAPSULE ORAL at 08:52

## 2018-09-16 RX ADMIN — ANALGESIC BALM: 1.74; 4.06 OINTMENT TOPICAL at 13:35

## 2018-09-16 RX ADMIN — ACETAMINOPHEN 650 MG: 325 TABLET, FILM COATED ORAL at 16:58

## 2018-09-16 RX ADMIN — AMLODIPINE BESYLATE 10 MG: 5 TABLET ORAL at 08:54

## 2018-09-16 RX ADMIN — ASPIRIN 81 MG: 81 TABLET, COATED ORAL at 08:54

## 2018-09-16 RX ADMIN — TACROLIMUS 2 MG: 1 CAPSULE ORAL at 21:27

## 2018-09-16 RX ADMIN — INSULIN ASPART 1 UNITS: 100 INJECTION, SOLUTION INTRAVENOUS; SUBCUTANEOUS at 21:30

## 2018-09-16 RX ADMIN — NYSTATIN 1000000 UNITS: 100000 SUSPENSION ORAL at 16:58

## 2018-09-16 RX ADMIN — PREDNISONE 5 MG: 5 TABLET ORAL at 08:54

## 2018-09-16 RX ADMIN — MAGNESIUM OXIDE TAB 400 MG (241.3 MG ELEMENTAL MG) 400 MG: 400 (241.3 MG) TAB at 08:53

## 2018-09-16 RX ADMIN — PANTOPRAZOLE SODIUM 40 MG: 40 TABLET, DELAYED RELEASE ORAL at 08:54

## 2018-09-16 RX ADMIN — HYDRALAZINE HYDROCHLORIDE 100 MG: 50 TABLET ORAL at 21:27

## 2018-09-16 RX ADMIN — NYSTATIN 1000000 UNITS: 100000 SUSPENSION ORAL at 13:32

## 2018-09-16 RX ADMIN — ATORVASTATIN CALCIUM 20 MG: 20 TABLET, FILM COATED ORAL at 21:27

## 2018-09-16 RX ADMIN — NYSTATIN 1000000 UNITS: 100000 SUSPENSION ORAL at 21:27

## 2018-09-16 NOTE — PLAN OF CARE
Problem: Goal Outcome Summary  Goal: Goal Outcome Summary  Outcome: No Change  Alert and oriented x4. Offered no complaints overnight. Asleep during rounds, minimal sleep interruptions maintained per patient request. Mod I in the room. Reported no problems with colostomy pocuh. Call light in reach. Continue with current POC.

## 2018-09-16 NOTE — PLAN OF CARE
Pt is alert and oriented x4. Denies SOB and chest pain during cares. BG was 73 at 1809, pt declined apple juice and/or snacks. Retook B at 1853 and informed pt importance of apple juice and snacks of which pt agreed at this time. BG 63 at 1906,  92 at 1923 and 103 at 1942; held insulin aspart. Held Humulin until  and then administered (approved by Charge RN). Mag is 2.1 today. Pt is mod independent with walker in room of which pt refuses need of walker during shift. No complaints of pain. Colostomy intact; cares done by pt.     Temp: 97.8  F (36.6  C) Temp src: Oral BP: 142/82 Pulse: 96 Heart Rate: 96 Resp: 18 SpO2: 99 % O2 Device: None (Room air)

## 2018-09-16 NOTE — PLAN OF CARE
Problem: Goal Outcome Summary  Goal: Goal Outcome Summary  Pt denies pain this shift. VSS, up to about 1300. Pt developed fever and chills/shaking. Last temp for day shift >100. See previous RN note. Blood cultures drawn, UA/UC to be sent on evening shift. CXR ordered for tomorrow. BG low this AM-68, RN spoke with MD, who changed AM humulin to 10 units. Continue to monitor.

## 2018-09-16 NOTE — PLAN OF CARE
Pt is alert and oriented x4. Denies chest pain and SOB. Noted labored breathing after activity from changing clothes. Temp 101.2 at 1559, 101.1 at 1638 and 100.5 at 1700. Administered PRN Tylenol, asymptomatic; informed Charge RN who paged Provider. Lactic 1.3 at 1630. Temp 98.5 at 2100. Pt unable to provide urine sample for UA this shift indicating that he typically urinates at around 2408-5032.  and 156 at 2059. Colostomy intact, cares completed by pt. Pt is Mod I in room with walker but continues to decline need for walker. Edema +1 noted on BLE, elevated.     Temp: 98.5  F (36.9  C) Temp src: Oral BP: 145/86 Pulse: 96 Heart Rate: 99 Resp: 21 SpO2: 100 % O2 Device: None (Room air)

## 2018-09-16 NOTE — PLAN OF CARE
Problem: Goal Outcome Summary  Goal: Goal Outcome Summary  Patient having chills. T-100.6 R-16  B/p 153/89.   Patient denies any pain or burning on urination. Chest incision CDI and healed. Dialysis port insertion site has no signs of any infection. Patient denies any cough and no sputum. Patient states this happened when he was in the hospital.  I web based paged Dr. Schuler and he called and ordered Blood cultures and UA/UC.   Will update patient's nurse Liya RN on new orders.

## 2018-09-16 NOTE — PROGRESS NOTES
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 CT 8/12 showed multiple inflamed loops of bowel, specifically with inflamed segments of proximal and distal sigmoid colon thought to likely be diverticulitis with associated 7.8cm pericolonic abscess. On exam, patient also found to have a 2.7 cm daniella-rectal abscess that was I&D'd 8/12/18. Completed cipro and flagyl course 8/14-8/27 after biopsy tested positive for Citrobacter freundii complex, VRE, Klebsiella pneumonia, pseudomonas aeruginosa and veillonella. Pt also tested positive for C.diff 8/13 and treated w/PO Vanco x 24 days (continued for 10 days after course for intraabdominal infection). IR was consulted for pericolonic abscess, but felt no safe window to drain abscess safely, so pt then taken back to the OR on 8/14/18 for lap sigmoidectomy with end colostomy. 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 CT enterography 8/30 was negative for abscess with improvement in dilated bowel. This was later  complicated by ileus requiring NG decompression and concern for bleeding when anticoagulation was reintroduced as patient was found to have down-trending Hgb. Pt was transfused with 3 units PRBCs 8/21-8/24. Most recent Hgb on upward trend of 10.1 on 9/14.   -WOCN consulted for ostomy cares and appreciate following.   -CBC to assess Hgb qMF  -Continue contact precautions for VRE   -F/U with colorectal surgery as outpatient (will have [facility] schedule appt week of 9/24)     Neutropenic leukopenia: Per transplant ID, likely 2/2 immunosuppression with tacrolimus and prednisone. WBC since 8/29 has been low with most recent WBC 2.2 and ANC 1.3 on 9/14. Immunknow level 95, representing low immune response on 9/6.   - CBC w/ diff qMF  - Per Cardiology discharge summary, if ANC < or = to 1.0, may give one or more G-CSF doses until ANC count >1  - Continue neutropenic precautions  - CMV viral loads qM  - Repeat Immunoknow level once WBC/ANC count normalizes     [facility]IM s/p heart transplant (6/14/18), RIJ thrombus: Pt had history of systolic HF 2/2 NICM, CKD, HTN, HLD and DM2. He was initially listed for both heart and kidney transplants, but ultimately received the heart transplant on 6/14/18 after being hospitalized for inotrope administration. Per post-op visit notes, he did initially have some post-op leukocytosis that was successfully treated. Additionally, he developed a RIJ thrombus following transplant that was again seen on US 8/12, but was nonocclusive. Repeat US on 9/11 showed occlusive thrombus to the lower RIJ. This was interpreted to be more chronic versus worsening. He has otherwise been doing clinically well without evidence of graft dysfunction. Most recent echo 7/20 showed global and regional left ventricular function that was hyperkinetic with an EF of >70%, mild-moderate LVH, and RV with normal size and systolic function with mild hypertrophy. Per ID, immunosuppression with Mycophenolate 500mg BID, Tacrolimus 2 mg  BID (Goal of 6-8 d/t infection), Prednisone 5 mg BID. Most recent tacro level at 6.0 on 9/14 (~11 hr trough).   - D/w on-call heart transplant RN today and continue with tacrolimus 2 mg BID for now and transplant RN tomorrow am will review tac levels and adjust dose if indicated.   - Tacro levels qMF  - CMV PCR qM  - Next right heart cath and biopsy due in 2 weeks 10/8.  - Per Dr. Ernst, pt is to remain off of Eliquis 2/2 to recent significant bleed     HTN: Managed on hydralazine, amlodipine and lisinopril outpatient. Lisinopril initially held when pt's Hgb dropped, but restarted later at 2.5mg daily (pt had been previously taking 5mg). BP this afternoon 152/85 and prior to receiving meds.  - Continue PTA medications w/ parameters to hold  - Continue to monitor BPs closely.       ESRD on HD: 2/2 HTN and DM2. Pt not currently on transplant list as of 8/29 due to patient being to ill. Per nephrology, needs rehab and better nutritional status to be active.  - Continue HD T, Th,Sa  - F/u with Nephrology outpatient (sees Dr. Mcpherson)  - Monitor I/Os  - Daily weights  - Monitor electrolytes via BMP qMF     DM2: Most recent Hgb A1c of 7.0% on 7/18 and was stable from 4/18. PTA on NPH 30 units qam, 16 units qpm. While in hospital, managed on lantus 16 units qam, and Novolog sliding scale. On TCU admission Lantus discontinued and started on PTA regimen, however, at half doses. BG this am borderline hypoglycemic but pt asymptomatic.      Recent Labs  Lab 09/16/18  0822 09/16/18  0756 09/16/18  0732 09/15/18  2212 09/15/18  1942 09/15/18  1923  09/14/18  0919  09/10/18  0640   GLC  --   --   --   --   --   --   --  148*  --  108*   * 69* 65* 186* 103* 92  < >  --   < >  --    < > = values in this interval not displayed.   - Continue Humulin N, but decrease am dose to 10 units. Continue 8 units qpm.  - Carb controlled diet  - BG monitoring QID   - Hypoglycemic protocol.     Acute on Chronic Anemia: Likely acute  exacerbation in setting of colitis. While inpt, pt did receive 3U PRBCs for decreased Hgb 8/21-8/23 with lowest Hgb of 6.9. BL appears to be ~8.5-11.0. Most recent Hgb 10.1 on 9/14, which was improved from 7.9 on 9/10. Peripheral smear with no acute concerns 8/31.   - Continue Epo per nephrology  - Monitor via CBC qMF      Non-Severe Protein Calorie Malnutrition: Albumin 2.0 9/10/18 with noted improvement in oral intake.   - Nutrition consulted and appreciate recommendations  - Calorie counts  - Continue carb controlled diet     New small pulmonary nodules:  CT abdomen/pelvis 8/12 showed new small (3mm and 5mm) pulmonary nodules and found to be increased in size to 6mm on 8/29 CT entergraphy.  - Repeat CT scan chest with contrast tomorrow per ID recommendations since pt transplant pt (refer to their initial consult note dated 8/12).      Resolved/Ongoing Problems:  GERD: Continue PTA protonix.  HLD: Most recent lipid panel 7/18 with TC of 175, HDL of 92, LDL of 66. Continue with PTA atorvastatin.  Hx of P.auerginosa bacteremia 2/2 necrotic oral ulcer:  History of positive blood cultures x 2 from 7/26 with retained dialysis catheter for Pseudomonas aeruginosa. NG from repeat cultures 7/26, 7/28, 8/10 and 8/12. Per ID, long course of antibiotics as above active against Pseudomonas (cefepime through 8/23, then ciprofloxacin 8/27), but there is still a risk of reoccurrence. Oral ulcer was noted to be improved on discharge from [facility].       Diet and/or tube feedings: Carbohydrate Controlled Diet   Lines, tubes, drains: None  DVT/GI prophylaxis: Pneumatic Compression Devices   Indications for psychotropic medications: N/A  Code status discussed on admission: Full Code         Consults:   PT/OT / WOCN / Nutrition         Discharge Planning:   PT scheduled through 9/22.        Interval History:   No acute events overnight. States conditioning improving. Denies fever, chills, chest pain, SOB, abd pain, and nausea. States  bowel output from stoma normal and able to produced some urine and unchanged from days prior.          Physical Exam:   Vitals were reviewed  Blood pressure 152/85, pulse 93, temperature 98.3  F (38.1  C), temperature source Oral, resp. rate 16, weight 78.2 kg (172 lb 6.4 oz), SpO2 99 %.  GEN: In NAD  HEENT: NCAT; PERRL; sclerae non-icteric  LUNGS: CTAB  CV: RRR  ABD: +BSs; SNTND  EXT: 2+ BLE edema  SKIN: No acute rashes noted on exposed areas.  NEURO: AAOx3; CNs grossly intact; No acute focal deficits noted.        ROUTINE LABS:  CMP    Recent Labs  Lab 09/15/18  0603 09/14/18  0919 09/10/18  0640   NA  --  139 137   POTASSIUM  --  3.8 3.7   CHLORIDE  --  101 100   CO2  --  28 29   ANIONGAP  --  10 8   GLC  --  148* 108*   BUN  --  13 20   CR  --  3.47* 4.50*   GFRESTIMATED  --  18* 13*   GFRESTBLACK  --  22* 16*   TRINI  --  8.2* 7.6*   MAG 2.1 1.6 1.8   PHOS  --   --  2.4*   PROTTOTAL  --   --  5.0*   ALBUMIN  --   --  2.0*   BILITOTAL  --   --  0.5   ALKPHOS  --   --  141   AST  --   --  14   ALT  --   --  14     CBC    Recent Labs  Lab 09/14/18  0919 09/11/18  1342 09/10/18  0640   WBC 2.2* 1.2* 1.2*   RBC 3.14* 2.57* 2.48*   HGB 10.1* 8.2* 7.9*   HCT 32.1* 26.5* 25.0*   * 103* 101*   MCH 32.2 31.9 31.9   MCHC 31.5 30.9* 31.6   RDW 21.6* 22.1* 21.8*    179 160     INR    Recent Labs  Lab 09/10/18  0640   INR 1.05       Leonid Muller PA-C  Internal Medicine Hospitalist   [facility]  [phone]

## 2018-09-17 LAB
ALBUMIN UR-MCNC: 100 MG/DL
ANION GAP SERPL CALCULATED.3IONS-SCNC: 12 MMOL/L (ref 3–14)
APPEARANCE UR: CLEAR
BASOPHILS # BLD AUTO: 0 10E9/L (ref 0–0.2)
BASOPHILS NFR BLD AUTO: 0.2 %
BILIRUB UR QL STRIP: NEGATIVE
BUN SERPL-MCNC: 24 MG/DL (ref 7–30)
CALCIUM SERPL-MCNC: 8.4 MG/DL (ref 8.5–10.1)
CHLORIDE SERPL-SCNC: 101 MMOL/L (ref 94–109)
CO2 SERPL-SCNC: 25 MMOL/L (ref 20–32)
COLOR UR AUTO: YELLOW
CREAT SERPL-MCNC: 4.61 MG/DL (ref 0.66–1.25)
CRP SERPL-MCNC: 80.7 MG/L (ref 0–8)
DIFFERENTIAL METHOD BLD: ABNORMAL
EOSINOPHIL # BLD AUTO: 0 10E9/L (ref 0–0.7)
EOSINOPHIL NFR BLD AUTO: 0.2 %
ERYTHROCYTE [DISTWIDTH] IN BLOOD BY AUTOMATED COUNT: 20.8 % (ref 10–15)
ERYTHROCYTE [SEDIMENTATION RATE] IN BLOOD BY WESTERGREN METHOD: 33 MM/H (ref 0–20)
GFR SERPL CREATININE-BSD FRML MDRD: 13 ML/MIN/1.7M2
GLUCOSE BLDC GLUCOMTR-MCNC: 105 MG/DL (ref 70–99)
GLUCOSE BLDC GLUCOMTR-MCNC: 113 MG/DL (ref 70–99)
GLUCOSE BLDC GLUCOMTR-MCNC: 142 MG/DL (ref 70–99)
GLUCOSE BLDC GLUCOMTR-MCNC: 148 MG/DL (ref 70–99)
GLUCOSE BLDC GLUCOMTR-MCNC: 94 MG/DL (ref 70–99)
GLUCOSE SERPL-MCNC: 124 MG/DL (ref 70–99)
GLUCOSE UR STRIP-MCNC: 150 MG/DL
HCT VFR BLD AUTO: 31.1 % (ref 40–53)
HGB BLD-MCNC: 9.6 G/DL (ref 13.3–17.7)
HGB UR QL STRIP: NEGATIVE
HYALINE CASTS #/AREA URNS LPF: 3 /LPF (ref 0–2)
IMM GRANULOCYTES # BLD: 0 10E9/L (ref 0–0.4)
IMM GRANULOCYTES NFR BLD: 0.5 %
KETONES UR STRIP-MCNC: NEGATIVE MG/DL
LEUKOCYTE ESTERASE UR QL STRIP: NEGATIVE
LYMPHOCYTES # BLD AUTO: 0.4 10E9/L (ref 0.8–5.3)
LYMPHOCYTES NFR BLD AUTO: 7.9 %
MCH RBC QN AUTO: 32 PG (ref 26.5–33)
MCHC RBC AUTO-ENTMCNC: 30.9 G/DL (ref 31.5–36.5)
MCV RBC AUTO: 104 FL (ref 78–100)
MONOCYTES # BLD AUTO: 0.3 10E9/L (ref 0–1.3)
MONOCYTES NFR BLD AUTO: 7 %
NEUTROPHILS # BLD AUTO: 3.7 10E9/L (ref 1.6–8.3)
NEUTROPHILS NFR BLD AUTO: 84.2 %
NITRATE UR QL: NEGATIVE
PH UR STRIP: 7.5 PH (ref 5–7)
PLATELET # BLD AUTO: 254 10E9/L (ref 150–450)
POTASSIUM SERPL-SCNC: 3.6 MMOL/L (ref 3.4–5.3)
RBC # BLD AUTO: 3 10E12/L (ref 4.4–5.9)
RBC #/AREA URNS AUTO: 0 /HPF (ref 0–2)
SODIUM SERPL-SCNC: 138 MMOL/L (ref 133–144)
SOURCE: ABNORMAL
SP GR UR STRIP: 1.01 (ref 1–1.03)
TACROLIMUS BLD-MCNC: 5.8 UG/L (ref 5–15)
TME LAST DOSE: NORMAL H
UROBILINOGEN UR STRIP-MCNC: NORMAL MG/DL (ref 0–2)
WBC # BLD AUTO: 4.4 10E9/L (ref 4–11)
WBC #/AREA URNS AUTO: 2 /HPF (ref 0–5)

## 2018-09-17 PROCEDURE — 97530 THERAPEUTIC ACTIVITIES: CPT | Mod: GP | Performed by: PHYSICAL THERAPIST

## 2018-09-17 PROCEDURE — 40000193 ZZH STATISTIC PT WARD VISIT: Performed by: PHYSICAL THERAPIST

## 2018-09-17 PROCEDURE — A9270 NON-COVERED ITEM OR SERVICE: HCPCS | Mod: GY | Performed by: INTERNAL MEDICINE

## 2018-09-17 PROCEDURE — 25000132 ZZH RX MED GY IP 250 OP 250 PS 637: Mod: GY | Performed by: PHYSICIAN ASSISTANT

## 2018-09-17 PROCEDURE — 99309 SBSQ NF CARE MODERATE MDM 30: CPT | Performed by: NURSE PRACTITIONER

## 2018-09-17 PROCEDURE — 00000146 ZZHCL STATISTIC GLUCOSE BY METER IP

## 2018-09-17 PROCEDURE — 25000132 ZZH RX MED GY IP 250 OP 250 PS 637: Mod: GY | Performed by: INTERNAL MEDICINE

## 2018-09-17 PROCEDURE — 97116 GAIT TRAINING THERAPY: CPT | Mod: GP | Performed by: PHYSICAL THERAPIST

## 2018-09-17 PROCEDURE — 80197 ASSAY OF TACROLIMUS: CPT | Performed by: PHYSICIAN ASSISTANT

## 2018-09-17 PROCEDURE — 81001 URINALYSIS AUTO W/SCOPE: CPT | Performed by: INTERNAL MEDICINE

## 2018-09-17 PROCEDURE — 85652 RBC SED RATE AUTOMATED: CPT | Performed by: PHYSICIAN ASSISTANT

## 2018-09-17 PROCEDURE — 36415 COLL VENOUS BLD VENIPUNCTURE: CPT | Performed by: PHYSICIAN ASSISTANT

## 2018-09-17 PROCEDURE — 25000131 ZZH RX MED GY IP 250 OP 636 PS 637: Mod: GY | Performed by: INTERNAL MEDICINE

## 2018-09-17 PROCEDURE — 80048 BASIC METABOLIC PNL TOTAL CA: CPT | Performed by: PHYSICIAN ASSISTANT

## 2018-09-17 PROCEDURE — 86140 C-REACTIVE PROTEIN: CPT | Performed by: PHYSICIAN ASSISTANT

## 2018-09-17 PROCEDURE — 97110 THERAPEUTIC EXERCISES: CPT | Mod: GP | Performed by: PHYSICAL THERAPIST

## 2018-09-17 PROCEDURE — 85004 AUTOMATED DIFF WBC COUNT: CPT | Performed by: PHYSICIAN ASSISTANT

## 2018-09-17 PROCEDURE — 85027 COMPLETE CBC AUTOMATED: CPT | Performed by: PHYSICIAN ASSISTANT

## 2018-09-17 PROCEDURE — 12000022 ZZH R&B SNF

## 2018-09-17 PROCEDURE — A9270 NON-COVERED ITEM OR SERVICE: HCPCS | Mod: GY | Performed by: PHYSICIAN ASSISTANT

## 2018-09-17 PROCEDURE — 25000125 ZZHC RX 250: Performed by: INTERNAL MEDICINE

## 2018-09-17 RX ADMIN — TACROLIMUS 2 MG: 1 CAPSULE ORAL at 20:44

## 2018-09-17 RX ADMIN — PREDNISONE 5 MG: 5 TABLET ORAL at 08:33

## 2018-09-17 RX ADMIN — NYSTATIN 1000000 UNITS: 100000 SUSPENSION ORAL at 14:35

## 2018-09-17 RX ADMIN — ANALGESIC BALM: 1.74; 4.06 OINTMENT TOPICAL at 20:49

## 2018-09-17 RX ADMIN — MAGNESIUM OXIDE TAB 400 MG (241.3 MG ELEMENTAL MG) 400 MG: 400 (241.3 MG) TAB at 08:34

## 2018-09-17 RX ADMIN — TACROLIMUS 2 MG: 1 CAPSULE ORAL at 09:46

## 2018-09-17 RX ADMIN — Medication 1 CAPSULE: at 08:34

## 2018-09-17 RX ADMIN — ASPIRIN 81 MG: 81 TABLET, COATED ORAL at 08:34

## 2018-09-17 RX ADMIN — HYDRALAZINE HYDROCHLORIDE 100 MG: 50 TABLET ORAL at 06:49

## 2018-09-17 RX ADMIN — NYSTATIN 1000000 UNITS: 100000 SUSPENSION ORAL at 20:44

## 2018-09-17 RX ADMIN — ANALGESIC BALM: 1.74; 4.06 OINTMENT TOPICAL at 10:00

## 2018-09-17 RX ADMIN — AMLODIPINE BESYLATE 10 MG: 5 TABLET ORAL at 08:34

## 2018-09-17 RX ADMIN — NYSTATIN 1000000 UNITS: 100000 SUSPENSION ORAL at 17:42

## 2018-09-17 RX ADMIN — ACETAMINOPHEN 650 MG: 325 TABLET, FILM COATED ORAL at 14:04

## 2018-09-17 RX ADMIN — PREDNISONE 5 MG: 5 TABLET ORAL at 17:42

## 2018-09-17 RX ADMIN — NYSTATIN 1000000 UNITS: 100000 SUSPENSION ORAL at 08:32

## 2018-09-17 RX ADMIN — PANTOPRAZOLE SODIUM 40 MG: 40 TABLET, DELAYED RELEASE ORAL at 08:34

## 2018-09-17 RX ADMIN — ATORVASTATIN CALCIUM 20 MG: 20 TABLET, FILM COATED ORAL at 20:44

## 2018-09-17 RX ADMIN — LISINOPRIL 5 MG: 2.5 TABLET ORAL at 08:33

## 2018-09-17 RX ADMIN — HYDRALAZINE HYDROCHLORIDE 100 MG: 50 TABLET ORAL at 14:02

## 2018-09-17 RX ADMIN — ANALGESIC BALM: 1.74; 4.06 OINTMENT TOPICAL at 14:03

## 2018-09-17 NOTE — PLAN OF CARE
Problem: Goal Outcome Summary  Goal: Goal Outcome Summary  Outcome: No Change  Alert and oriented x4. Patient reported no new concerns. No complaints of pain during cares. Urine sample collected and sent to lab for analysis as per previous orders, see results. No problems with the colostomy pouch. Mod I in the room. T 99.6 tonight, patient otherwise reporting no other symptoms. Call light in reach. Continue with POC.

## 2018-09-17 NOTE — PLAN OF CARE
Problem: Goal Outcome Summary  Goal: Goal Outcome Summary  RN: Pt to get CXR 2 views at this time, temp 101 oral, provider updated and these orders new for CXR. CT chest to be done tomorrow morning 0730 prior to pt dialysis so contrast will be dialyzed off, see pt care order for night nurse for tomorrow morning.  No cough. Awaiting lab draws new ordered for increase temp today.  See results from yesterday also when pt had increase temp. Colostomy bag not emptied this shift, small amount stool noted. Continue to monitor. Pt had late breakfast and will have lunch upon return from CXR.

## 2018-09-17 NOTE — PLAN OF CARE
Pt running low grade temp, last temp 99.4 after PRN tylenol. MD/frank updated, no new order. Blood culture pending. Unable to collect urine sample for UA/UC; HD pt, voids once per day. Pt will try at HS.

## 2018-09-17 NOTE — PROGRESS NOTES
"Brief medicine note:  - Met and examined pt today in his room after he had a fall last night and again when outside this morning waiting for his dialysis ride. Pt states he overestimated his leg strength and now knows the extent to how currently weak his body actually is after his hospitalization and knows it will be later than next Monday 9/17 before he goes home. Was in his bathroom last night when his legs \"gave out\" and he slid down to a sitting position but denies hitting head or significant injury. This am while waiting for dialysis, states \"my knees gave out\" and landed on his buttock but then was able to get up and get in car to go to dialysis. Again, denies sustaining any injuries including hitting head. Currently denies acute physical concerns after returning from dialysis this afternoon including fever, chills, chest pain, SOB, HA, abd pain, nausea bowel and bladder concerns. States stool output in ostomy bag is unchanged.     GEN: Pleasant gentleman in NAD  VSS  HEENT: NCAT; PERRL; sclerae non-icteric; nares patent; MMM  NECK: No cervical lymphadenopathy  LUNGS: CTAB  CV: RRR  EXT: 1-2+ BLE pitting edema  SKIN: No open lesions or significant bruising noted.   NEURO: AAOx3; CNs grossly intact; MS weak but symmetric; No significant tremor; gait deferred.     ASSESSMENT:  Acute falls last night and again this am due to significant overall deconditioned state: Pt asymptomatic and no e/o on exam of any significant negative sequelae.    PLAN: No medical intervention necessary. D/w pt and he now knows due to falls how weak he currently is and that he needs assistance with transportation to and from dialysis. Continue to encourage assistance with pt whenever needed.       Leonid Muller PA-C  Internal Medicine Hospitalist & Staff [facility]  [phone]     "

## 2018-09-17 NOTE — PLAN OF CARE
Problem: Goal Outcome Summary  Goal: Goal Outcome Summary  PT: patient continues to have low grade fever; mild symptoms- modified activities, as tolerable. Patient improved gait distance to 290 feet, 2 reps requiring ~5 minute recovery s/p. Continue to focus on proximal strengthening and stair negotiation for safe discharge home

## 2018-09-17 NOTE — PROGRESS NOTES
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      Fever - Febrile 9/16 101.2 with  Tmax this afternoon 101. No change in symptoms.  Blood cx (9/16) NGTD, UA bland. Currently on RA, with sp02 100%.  CRP 80.7 (5), ESR  33(72).  Discussed with ID (Dr. Lawrence) who agreed with planned chest CT and to add on CT A/P for infectious work up.  Subsequent CXR with streaky L retrocardiac opacities compatible w/ infection.  Differential broad currently but includes PNA (CXR compatible but asymptomatic) vs bacteremia (2/2 oral ulcer but with NGTD on blood cx) vs intraabdominal infection.    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 CT 8/12 showed multiple inflamed loops of bowel, specifically with inflamed segments of proximal and distal sigmoid colon thought to likely be diverticulitis with associated 7.8cm pericolonic abscess. On exam, patient also found to have a 2.7 cm daniella-rectal abscess that was I&D'd 8/12/18. Completed cipro and flagyl course 8/14-8/27 after biopsy tested positive for Citrobacter freundii complex, VRE, Klebsiella pneumonia, pseudomonas aeruginosa and veillonella. Pt also tested positive for C.diff 8/13 and treated w/PO Vanco x 24 days (continued for 10 days after course for intraabdominal infection). IR was consulted for pericolonic abscess, but felt no safe window to drain abscess safely, so pt then taken back to the OR on 8/14/18 for lap sigmoidectomy with end colostomy. 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 CT enterography 8/30 was negative for abscess with improvement in dilated bowel. This was later complicated by ileus requiring NG decompression and concern for bleeding when anticoagulation was reintroduced as patient was found to have down-trending Hgb. Pt was transfused with 3 units PRBCs 8/21-8/24.  - CT scan AP with IV contrast tomorrow (As above)  - WOCN consult for ostomy care  - Contact precautions for VRE   - Needs follow up with CRS surgery-Dr. Tran (appt to be scheduled week of ~9/24)     Neutropenic Leukopenia - Per transplant ID, likely 2/2 immunosuppression (tacro and prednisone).  WBC currently 4.4 (2.2) with ANC 3.7 (1.3).  Up trend may represent improvement in blood counts, however, also considering infection as cause of recent increase in WBC.  - CBC with diff daily for now while febrile   - Per cardiology discharge summary, if ANC <or = 1.0, may given one more more G-CSF doses until ANC count is >1.    - No need for neutropenic precautions currently  - Will need to send Immuknow  immune cell fxn once WBC and ANC normalized (HAL0021) but unclear if this should be done in setting of acute fever        NCIM s/p heart transplant (6/14/18), RIJ thrombus -  H/o systolic HF 2/2 NICM, CKD, HTN, HLD and DM2 s/p OHT 6/14/18.  Post op course c/b leukocytosis, RIJ thrombus (non-occlusive) which, on most recent US (9/11) was interpreted to be more chronic versus worsening.  Otherwise, doing clinically well w/o evidence of graft dysfunction.  Most recent TTE (7/20) showed glabal and regional L ventricular function that was hyperkinetic with LVEF of >70%, mild-moderate LVH and RV with normal size and systolic fxn w/ mild hypertrophy.  Negative biopsy 8/24 and 9/5. Cellcept discontiued since 9/8/18 2/2 leukopenia, Valcyte discontinued 9/5.  Per ID, immunosuppression with Prednisone 5 mg BID and Tacrolimus 2 gm BID.  Tacro goal 6-8 (due to infection) with most recent Tacro level 5.8 today.   - Discussed with transplant surgery today, continue current Tacro dose 2 mg BID and recheck level on Wednesday, 9/19 to ensure therapeutic.    - Per Dr. Ernst, continue tacro and prednisone only, he will give further reccs regarding immunosuppression based on next biopsy results   - CMV PCR not ordered this AM, send in AM then weekly qM  - Next right heart cath and biopsy due in 2 weeks 10/8.  - Per Dr. Ernst, pt is to remain off of Eliquis 2/2 to recent significant bleed    HTN - Managed on hydralazine, amlodipine and lisinopril outpatient. Lisinopril initially held when pt's Hgb dropped, but restarted later at 2.5mg daily (pt had been previously taking 5mg). BPs currently 126/66.  - Continue PTA medications w/ parameters to hold  - Continue to monitor BPs closely.       ESRD on HD - 2/2 HTN and DM2. Pt not currently on transplant list as of 8/29 due to patient being to ill. Per nephrology, needs rehab and better nutritional status to be active.  - Continue HD T, Th,Sa  - [facility]  - Monitor I/Os  - Daily weights  - Monitor electrolytes via BMP qMF      DM2 - Most recent Hgb A1c of 7.0% on 7/18 and was stable from 4/18. PTA on NPH 30 units qam, 16 units qpm. While in hospital, managed on lantus 16 units qam, and Novolog sliding scale. On TCU admission Lantus discontinued and started on PTA regimen, however, at half doses. Current glucoses     Recent Labs  Lab 09/17/18  1846 09/17/18  1349 09/17/18  0925 09/17/18  0831 09/16/18  2159 09/16/18  1656 09/16/18  1401  09/14/18  0919   GLC  --   --  124*  --   --   --   --   --  148*   * 94  --  105* 156* 148* 113*  < >  --    < > = values in this interval not displayed.  - Continue Humulin N at 10 units in AM and 8 units in PM.  Will given 4 units tonight given NPO status.   - Glucose checks QID   - Hypoglycemia protocol   - CArb controlled diet     Acute on Chronic Anemia - Likely acute exacerbation in setting of colitis. While inpt, pt did receive 3U PRBCs for decreased Hgb 8/21-8/23 with lowest Hgb of 6.9. BL appears to be ~8.5-11.0. Most recent Hgb 9.6 (10.1).   Peripheral smear with no acute concerns 8/31.   - Continue Epo per nephrology  - Monitor via CBC w/ diff daily for now, then can return to twice weekly checks       Non-Severe Protein Calorie Malnutrition -  Albumin 2.0 9/10/18 with noted improvement in oral intake.   - Nutrition consulted and appreciate recommendations  - Calorie counts  - Continue carb controlled diet      New small pulmonary nodules - CT abdomen/pelvis 8/12 showed new small (3mm and 5mm) pulmonary nodules and found to be increased in size to 6mm on 8/29 CT entergraphy.  - Repeat CT scan chest with contrast tomorrow per ID recommendations since pt transplant pt (refer to their initial consult note dated 8/12).       Resolved/Ongoing Problems:  GERD. Continue PTA protonix.  HLD. Most recent lipid panel 7/18 with TC of 175, HDL of 92, LDL of 66. Continue with PTA atorvastatin.  Hx of P.auerginosa  bacteremia 2/2 necrotic oral ulcer.  History of positive blood cultures x 2 from 7/26 with retained dialysis catheter for Pseudomonas aeruginosa. NG from repeat cultures 7/26, 7/28, 8/10 and 8/12. Per ID, long course of antibiotics as above active against Pseudomonas (cefepime through 8/23, then ciprofloxacin 8/27), but there is still a risk of reoccurrence. Oral ulcer was noted to be improved on discharge from [facility].  - If no clear infectious source identified, may need to have oral ulcer looked at more closely     Diet and/or tube feedings: Carbohydrate Controlled Diet   Lines, tubes, drains: Hemodialysis and Peripheral IV  DVT/GI prophylaxis: Pneumatic Compression Devices, on daily ASA 81 mg   Indications for psychotropic medications: No diagnosis  Code status: Full Code         Consults:   PT/OT/WOCN/Nutrition  ID following peripherally at TCU   Cardiology following at TCU         Discharge Planning:   PT scheduled through 9/22.  TCU course dependent on resolution of fevers.         Interval History:   Chief complaint of fevers with chills.  He notes all day yesterday he had trouble keeping warm despite having a fever.  No chills today but now he feels warm.  Denies chest pain, cough, shortness of breath, abdominal pain, diarrhea, rhinorrhea.  .              Physical Exam:   Blood pressure 143/79, pulse 105, temperature 101  F (38.3  C), resp. rate 21, weight 78.2 kg (172 lb 6.4 oz), SpO2 100 %.  GENERAL: Awake. Appears to be resting comfortably. NAD.   HEENT: NC/AT. Anicteric sclera. Mucous membranes moist.  Ulcerated lesion upper palate just behind front teeth with gray/white appearance.  No pain, purulence or erythema.   NECK: Supple   CV: RRR, S1S2. No appreciable murmurs, rubs, or gallops.   RESPIRATORY: Normal respiratory effort on RA. Lungs CTAB without wheezes, rales or rhonchi.   GI: Soft, non-tender, and non-distended with bowel sounds present in all quadrants. Stoma viable with soft, brown stool in  bag. Lap incisions well healed without drainage.   EXTREMITIES: No peripheral edema. Warm & well perfused.  NEUROLOGIC: Alert and orientated x 3. CN II-XII grossly intact. No focal deficits.   MUSCULOSKELETAL: No joint swelling or tenderness.   SKIN: No jaundice. No rashes or lesions. Right subclavian HD line CDI.      Labs & Studies:     ROUTINE IP LABS (Last four results)  CMP   Recent Labs  Lab 09/17/18  0925 09/15/18  0603 09/14/18  0919     --  139   POTASSIUM 3.6  --  3.8   CHLORIDE 101  --  101   CO2 25  --  28   ANIONGAP 12  --  10   *  --  148*   BUN 24  --  13   CR 4.61*  --  3.47*   TRINI 8.4*  --  8.2*   MAG  --  2.1 1.6     CBC   Recent Labs  Lab 09/17/18  0925 09/14/18  0919 09/11/18  1342   WBC 4.4 2.2* 1.2*   RBC 3.00* 3.14* 2.57*   HGB 9.6* 10.1* 8.2*   HCT 31.1* 32.1* 26.5*   * 102* 103*   MCH 32.0 32.2 31.9   MCHC 30.9* 31.5 30.9*   RDW 20.8* 21.6* 22.1*    247 179     INR No lab results found in last 7 days.      Unresulted Labs Ordered in the Past 30 Days of this Admission     Date and Time Order Name Status Description    9/16/2018 1419 Blood culture Preliminary     9/16/2018 1419 Blood culture Preliminary     8/14/2018 1143 Platelets prepare order unit In process         Results for orders placed or performed during the hospital encounter of 09/11/18   XR Chest 2 Views    Narrative    Exam: XR CHEST 2 VW, 9/17/2018 3:10 PM    Indication: Fever, eval for PNA or infectious process;     Comparison: 7/26/2018    Findings:   PA and lateral views of the chest. Postsurgical changes of cardiac  transplantation, including median sternotomy wires. Tunneled right IJ  central venous catheter tip projects within the right atrium, stable.  Trachea is midline. Cardiomediastinal silhouette is unchanged. New  streaky left retrocardiac opacities. No pleural effusion or  pneumothorax. Visualized upper abdomen and osseous structures are  unremarkable.      Impression    Impression:   1.  Streaky left retrocardiac opacities compatible with infection. No  pleural effusion.  2. Stable postoperative changes of cardiac transplantation.    I have personally reviewed the examination and initial interpretation  and I agree with the findings.    CLARICE VILLARREAL MD     *Note: Due to a large number of results and/or encounters for the requested time period, some results have not been displayed. A complete set of results can be found in Results Review.       Marianne Welsh, [facility]  Hospitalist Service  Pager [phone]

## 2018-09-18 ENCOUNTER — APPOINTMENT (OUTPATIENT)
Dept: LAB | Facility: CLINIC | Age: 63
End: 2018-09-18
Attending: HOSPITALIST
Payer: MEDICARE

## 2018-09-18 LAB
BASOPHILS # BLD AUTO: 0 10E9/L (ref 0–0.2)
BASOPHILS NFR BLD AUTO: 0.3 %
CMV DNA SPEC NAA+PROBE-ACNC: NORMAL [IU]/ML
CMV DNA SPEC NAA+PROBE-LOG#: NORMAL {LOG_IU}/ML
CRP SERPL-MCNC: 119 MG/L (ref 0–8)
DIFFERENTIAL METHOD BLD: ABNORMAL
EOSINOPHIL # BLD AUTO: 0 10E9/L (ref 0–0.7)
EOSINOPHIL NFR BLD AUTO: 0.3 %
ERYTHROCYTE [DISTWIDTH] IN BLOOD BY AUTOMATED COUNT: 20.3 % (ref 10–15)
GLUCOSE BLDC GLUCOMTR-MCNC: 148 MG/DL (ref 70–99)
GLUCOSE BLDC GLUCOMTR-MCNC: 149 MG/DL (ref 70–99)
GLUCOSE BLDC GLUCOMTR-MCNC: 168 MG/DL (ref 70–99)
GLUCOSE BLDC GLUCOMTR-MCNC: 264 MG/DL (ref 70–99)
GLUCOSE BLDC GLUCOMTR-MCNC: 80 MG/DL (ref 70–99)
HCT VFR BLD AUTO: 27.4 % (ref 40–53)
HGB BLD-MCNC: 8.5 G/DL (ref 13.3–17.7)
IMM GRANULOCYTES # BLD: 0 10E9/L (ref 0–0.4)
IMM GRANULOCYTES NFR BLD: 0.5 %
LACTATE BLD-SCNC: 0.6 MMOL/L (ref 0.7–2)
LYMPHOCYTES # BLD AUTO: 0.2 10E9/L (ref 0.8–5.3)
LYMPHOCYTES NFR BLD AUTO: 6.4 %
MCH RBC QN AUTO: 32.1 PG (ref 26.5–33)
MCHC RBC AUTO-ENTMCNC: 31 G/DL (ref 31.5–36.5)
MCV RBC AUTO: 103 FL (ref 78–100)
MONOCYTES # BLD AUTO: 0.5 10E9/L (ref 0–1.3)
MONOCYTES NFR BLD AUTO: 14.2 %
NEUTROPHILS # BLD AUTO: 2.9 10E9/L (ref 1.6–8.3)
NEUTROPHILS NFR BLD AUTO: 78.3 %
NRBC # BLD AUTO: 0 10*3/UL
NRBC BLD AUTO-RTO: 0 /100
PLATELET # BLD AUTO: 208 10E9/L (ref 150–450)
RBC # BLD AUTO: 2.65 10E12/L (ref 4.4–5.9)
SPECIMEN SOURCE: NORMAL
WBC # BLD AUTO: 3.7 10E9/L (ref 4–11)

## 2018-09-18 PROCEDURE — A9270 NON-COVERED ITEM OR SERVICE: HCPCS | Mod: GY | Performed by: PHYSICIAN ASSISTANT

## 2018-09-18 PROCEDURE — 36415 COLL VENOUS BLD VENIPUNCTURE: CPT | Performed by: NURSE PRACTITIONER

## 2018-09-18 PROCEDURE — 86140 C-REACTIVE PROTEIN: CPT | Performed by: NURSE PRACTITIONER

## 2018-09-18 PROCEDURE — 00220000 ZZH SNF RUG CODE OPNP

## 2018-09-18 PROCEDURE — 12000022 ZZH R&B SNF

## 2018-09-18 PROCEDURE — 83605 ASSAY OF LACTIC ACID: CPT | Performed by: INTERNAL MEDICINE

## 2018-09-18 PROCEDURE — 25000132 ZZH RX MED GY IP 250 OP 250 PS 637: Mod: GY | Performed by: PHYSICIAN ASSISTANT

## 2018-09-18 PROCEDURE — 25000132 ZZH RX MED GY IP 250 OP 250 PS 637: Mod: GY | Performed by: INTERNAL MEDICINE

## 2018-09-18 PROCEDURE — 85025 COMPLETE CBC W/AUTO DIFF WBC: CPT | Performed by: NURSE PRACTITIONER

## 2018-09-18 PROCEDURE — 25000128 H RX IP 250 OP 636: Performed by: INTERNAL MEDICINE

## 2018-09-18 PROCEDURE — A9270 NON-COVERED ITEM OR SERVICE: HCPCS | Mod: GY | Performed by: NURSE PRACTITIONER

## 2018-09-18 PROCEDURE — 25000131 ZZH RX MED GY IP 250 OP 636 PS 637: Mod: GY | Performed by: INTERNAL MEDICINE

## 2018-09-18 PROCEDURE — 25000132 ZZH RX MED GY IP 250 OP 250 PS 637: Mod: GY | Performed by: NURSE PRACTITIONER

## 2018-09-18 PROCEDURE — 36415 COLL VENOUS BLD VENIPUNCTURE: CPT | Performed by: INTERNAL MEDICINE

## 2018-09-18 PROCEDURE — 00000146 ZZHCL STATISTIC GLUCOSE BY METER IP

## 2018-09-18 PROCEDURE — 25000125 ZZHC RX 250: Performed by: INTERNAL MEDICINE

## 2018-09-18 PROCEDURE — A9270 NON-COVERED ITEM OR SERVICE: HCPCS | Mod: GY | Performed by: INTERNAL MEDICINE

## 2018-09-18 RX ORDER — IOPAMIDOL 755 MG/ML
100 INJECTION, SOLUTION INTRAVASCULAR ONCE
Status: COMPLETED | OUTPATIENT
Start: 2018-09-18 | End: 2018-09-18

## 2018-09-18 RX ADMIN — TACROLIMUS 2 MG: 1 CAPSULE ORAL at 09:03

## 2018-09-18 RX ADMIN — PREDNISONE 5 MG: 5 TABLET ORAL at 09:03

## 2018-09-18 RX ADMIN — TACROLIMUS 2 MG: 1 CAPSULE ORAL at 21:00

## 2018-09-18 RX ADMIN — ATORVASTATIN CALCIUM 20 MG: 20 TABLET, FILM COATED ORAL at 21:00

## 2018-09-18 RX ADMIN — IOPAMIDOL 84 ML: 755 INJECTION, SOLUTION INTRAVENOUS at 07:50

## 2018-09-18 RX ADMIN — LISINOPRIL 5 MG: 2.5 TABLET ORAL at 09:02

## 2018-09-18 RX ADMIN — INSULIN ASPART 1 UNITS: 100 INJECTION, SOLUTION INTRAVENOUS; SUBCUTANEOUS at 19:32

## 2018-09-18 RX ADMIN — NYSTATIN 1000000 UNITS: 100000 SUSPENSION ORAL at 09:05

## 2018-09-18 RX ADMIN — SODIUM CHLORIDE 60 ML: 9 INJECTION, SOLUTION INTRAVENOUS at 07:50

## 2018-09-18 RX ADMIN — PREDNISONE 5 MG: 5 TABLET ORAL at 17:56

## 2018-09-18 RX ADMIN — NYSTATIN 1000000 UNITS: 100000 SUSPENSION ORAL at 23:06

## 2018-09-18 RX ADMIN — AMLODIPINE BESYLATE 10 MG: 5 TABLET ORAL at 09:02

## 2018-09-18 RX ADMIN — HYDRALAZINE HYDROCHLORIDE 100 MG: 50 TABLET ORAL at 21:00

## 2018-09-18 RX ADMIN — ANALGESIC BALM: 1.74; 4.06 OINTMENT TOPICAL at 19:35

## 2018-09-18 RX ADMIN — NYSTATIN 1000000 UNITS: 100000 SUSPENSION ORAL at 17:56

## 2018-09-18 NOTE — PROGRESS NOTES
Acupuncture Clinical Internship Intake and Treatment Documentation   [employer]    Date:  9/18/2018  Patient Name:  Murray Nicholson   YOB: 1955     Repeat Patient:  no  Has patient had acupoint/acupressure treatment before:  yes    Signed consent placed in the medical record:  yes  Patient/Family verbalizes understanding of risks and benefits:  yes  Required information provided to patient:  yes    Diagnosis:  pericolonic abscess, rectal abscess, colitis; s/p colostomy in setting of recent heart transplant    Patient condition and treatment:  deconditioned  Reason for Intervention Today/Chief Complaint:  Tension in bilateral hamstring    Isolation:  Yes. Details: contact  Type:  Contact    PRE-SCORE:  NA    Other Western medical information:  See medical chart    Medications  No current outpatient prescriptions on file.       Pre-Treatment Assessment  Chief Complaint/ Reason for Intervention Today:  Weakness and tightness in bilateral hamstrings  Chief Complaint Pre-Score:  moderate   Describe:  Before exercise legs feel stiff and then afterwards they feel weak  Pain Location:  In bladder and gallbladder channel in bilateral LE's  Pre Session Pain:  Moderate  Pre Session Anxiety:  Other   Pre Session Nausea:  None    10 Traditional Chinese Medicine Assessment Questions  - Cold/ Heat:  Legs feel cold. Reports recently arms feeling cold  - Sweat:  None seen or reported  - Headaches/Body aches:  Bilateral legs  - Chest/Abdomen: has coelosomy bag  - Digestion:  Appetite has recently been poor  - Bowel Movement/Urination:  ceolesomy bag  - Hearing/Vision:  Wears glasses  - Sleep (prior to hospital):  Never has had trouble but did state if he has difficulty falling asleep he has strategies to use  - Energy:  Recently low  - Emotions:  Stated tired of being in the hospital  - Ob Gyn:  Not discussed  - Miscellaneous:  Not discussed    Traditional Chinese Medicine  Assessment  - TONGUE:  Thick yellow coating with light red tongue body underneath  - PULSE:  Rapid but not present in deep level in manisha position  - OBSERVATIONS:  Tremors at rest and with movement, non pitting edema in bilateral LE's     Traditional Chinese Medicine Diagnosis  - BRANCH:  Local qi and blood stagnation with dampness in LE's  - ROOT:  HT/KD imbalance     Traditional Chinese Medicine Treatment  - ACUPUNCTURE:  renny points on bilateral GB and UB channels, KD-7,9  DU-4, left LI-4  - NEEDLE COUNT: In: 23   Out: 23    Time In: 20     Magnet informed consent signed and given:  no    Post Treatment Assessment  Chief complaint post score:  Resting tremors stopped during treatment session  Post Session Observation:  Client rested quietly  Patient/Family Education:  How to bring qi to area  Verbal information provided:  yes  Written information provided:  no  All questions answered at time of treatment:  yes    Treatment/Procedure(s) performed by:  Mellisa Dee on 9/18/2018 at 4:30 PM      Date: 9/18/2018     *Attestation goes here*DM

## 2018-09-18 NOTE — PROGRESS NOTES
09/17/18 1100   General Information   Patient Profile Review See Profile for full history and prior level of function   Daily Contact with Relatives or Friends Phone call;Visit   Community Involvement   Community Involvement Retired  (worked retail)   Spiritual Practice Not connected/interested   Sees [facility] ? No   Music   Music Preferences Jazz   Brought Own Equipment No   Media   TV / Movies TV;Movie list   Reading Books;Has own reading materials   Sports / Physical Activities   Sports Fan Football   Impression   Open to Socializing with Others Independent   Barriers to Leisure No barriers / independent   Patient, family and / or staff in agreement with Plan of Care Yes   Treatment Plan   Type of Intervention Independent with activity;1:1 intervention;Structured groups   One to One Therapeutic Intervention Hand massage;Volunteer   Equipment and Supplies While on Unit Newspaper;Movies;Radio   Assessment Assessment completed with patient. Pt. is not interested in group activites during stay. Provided patient radio and CDs to listen for enjoyment.  Pt. reports feels content with leisure time although is open for visits for hand massage during stay on unit.

## 2018-09-18 NOTE — PLAN OF CARE
Patient informed writer that he do not take Hydralazine prior to dialysis, he usually takes med with him and takes at dialysis.

## 2018-09-18 NOTE — PLAN OF CARE
Problem: Goal Outcome Summary  Goal: Goal Outcome Summary  Outcome: No Change  Alert and oriented x 4. Pt requested muscle relaxer left sticky to MD.Good appetite and fluid intake. Independent in his room. B/G was 142 at dinner time and 168 at bedtime. Colostomy bag intact and pt empty the bag x 1. Edema BLE +2. Pt is going to have CT scan tomorrow around 7am. NPO starting 12am.

## 2018-09-18 NOTE — PROGRESS NOTES
Brief Medicine Note    Chart reviewed.  Has been afebrile today.  Tolerated HD without issue.  CT chest/abdomen/pelvis reviewed with cardiology and infectious disease (Dr. Lawrence).  CT shows new L>R lower lobe groundglass opacities (infection/inflammation vs edema vs drug reaction), scattered pulmonary nodules (unchanged from 8/18 and 8/29--one of which appears new), trace pleural effusions (unchanged) and prominent mucosal enhancement in gastric pylorus with inflammatory changes concerning for gastritis vs duodenitis.  WBC 3.5 this AM, ANC 2.9.  CRP up trending to 119 today (80).  CMV checked and not detectable today.   - Discussed with ID who recommends holding off on antibiotics for now given clinical stability   - If her were to become febrile and/or have pulmonary symptoms overnight, start Zosyn and consider transfer back to hospital   - I will reach out to CRS and discuss findings on CT scan  - Cards to see patient tomorrow   - CBC and CRP daily for now with sed rate   - Check Tacro level tomorrow AM     Marianne Welsh, [facility]  Hospitalist Service  Pager [phone]

## 2018-09-18 NOTE — PLAN OF CARE
Problem: Goal Outcome Summary  Goal: Goal Outcome Summary  RN: Pt left unit at 0900, friend transporting to dialysis. Declined part of morning medications, see MAR, will offer them to pt upon return. PT had cold cereal and milk prior to leaving. Colostomy bag intact.     Pt has not returned from [facility]ney D[facility]. Resume cares upon return.

## 2018-09-19 ENCOUNTER — APPOINTMENT (OUTPATIENT)
Dept: LAB | Facility: CLINIC | Age: 63
End: 2018-09-19
Attending: HOSPITALIST
Payer: MEDICARE

## 2018-09-19 LAB
BACTERIA SPEC CULT: NORMAL
BASOPHILS # BLD AUTO: 0 10E9/L (ref 0–0.2)
BASOPHILS NFR BLD AUTO: 0.5 %
CRP SERPL-MCNC: 121 MG/L (ref 0–8)
DIFFERENTIAL METHOD BLD: ABNORMAL
EOSINOPHIL # BLD AUTO: 0 10E9/L (ref 0–0.7)
EOSINOPHIL NFR BLD AUTO: 0.3 %
ERYTHROCYTE [DISTWIDTH] IN BLOOD BY AUTOMATED COUNT: 19.9 % (ref 10–15)
GLUCOSE BLDC GLUCOMTR-MCNC: 102 MG/DL (ref 70–99)
GLUCOSE BLDC GLUCOMTR-MCNC: 140 MG/DL (ref 70–99)
GLUCOSE BLDC GLUCOMTR-MCNC: 144 MG/DL (ref 70–99)
GLUCOSE BLDC GLUCOMTR-MCNC: 164 MG/DL (ref 70–99)
GLUCOSE BLDC GLUCOMTR-MCNC: 70 MG/DL (ref 70–99)
GRAM STN SPEC: ABNORMAL
GRAM STN SPEC: NORMAL
HCT VFR BLD AUTO: 30.3 % (ref 40–53)
HGB BLD-MCNC: 9.5 G/DL (ref 13.3–17.7)
IMM GRANULOCYTES # BLD: 0 10E9/L (ref 0–0.4)
IMM GRANULOCYTES NFR BLD: 0.5 %
LYMPHOCYTES # BLD AUTO: 0.3 10E9/L (ref 0.8–5.3)
LYMPHOCYTES NFR BLD AUTO: 8.3 %
MCH RBC QN AUTO: 31.9 PG (ref 26.5–33)
MCHC RBC AUTO-ENTMCNC: 31.4 G/DL (ref 31.5–36.5)
MCV RBC AUTO: 102 FL (ref 78–100)
MONOCYTES # BLD AUTO: 0.7 10E9/L (ref 0–1.3)
MONOCYTES NFR BLD AUTO: 17.4 %
NEUTROPHILS # BLD AUTO: 2.8 10E9/L (ref 1.6–8.3)
NEUTROPHILS NFR BLD AUTO: 73 %
NRBC # BLD AUTO: 0 10*3/UL
NRBC BLD AUTO-RTO: 0 /100
PLATELET # BLD AUTO: 223 10E9/L (ref 150–450)
RBC # BLD AUTO: 2.98 10E12/L (ref 4.4–5.9)
SPECIMEN SOURCE: ABNORMAL
SPECIMEN SOURCE: NORMAL
SPECIMEN SOURCE: NORMAL
TACROLIMUS BLD-MCNC: 7.9 UG/L (ref 5–15)
TME LAST DOSE: NORMAL H
WBC # BLD AUTO: 3.8 10E9/L (ref 4–11)

## 2018-09-19 PROCEDURE — 25000125 ZZHC RX 250

## 2018-09-19 PROCEDURE — 36415 COLL VENOUS BLD VENIPUNCTURE: CPT | Performed by: NURSE PRACTITIONER

## 2018-09-19 PROCEDURE — 00000146 ZZHCL STATISTIC GLUCOSE BY METER IP

## 2018-09-19 PROCEDURE — 87077 CULTURE AEROBIC IDENTIFY: CPT | Performed by: NURSE PRACTITIONER

## 2018-09-19 PROCEDURE — A9270 NON-COVERED ITEM OR SERVICE: HCPCS | Mod: GY | Performed by: INTERNAL MEDICINE

## 2018-09-19 PROCEDURE — 86140 C-REACTIVE PROTEIN: CPT | Performed by: NURSE PRACTITIONER

## 2018-09-19 PROCEDURE — 25000132 ZZH RX MED GY IP 250 OP 250 PS 637: Mod: GY | Performed by: NURSE PRACTITIONER

## 2018-09-19 PROCEDURE — 12000022 ZZH R&B SNF

## 2018-09-19 PROCEDURE — 87070 CULTURE OTHR SPECIMN AEROBIC: CPT | Performed by: NURSE PRACTITIONER

## 2018-09-19 PROCEDURE — 85025 COMPLETE CBC W/AUTO DIFF WBC: CPT | Performed by: NURSE PRACTITIONER

## 2018-09-19 PROCEDURE — 25000125 ZZHC RX 250: Performed by: INTERNAL MEDICINE

## 2018-09-19 PROCEDURE — 25000132 ZZH RX MED GY IP 250 OP 250 PS 637: Mod: GY | Performed by: PHYSICIAN ASSISTANT

## 2018-09-19 PROCEDURE — 40000275 ZZH STATISTIC RCP TIME EA 10 MIN

## 2018-09-19 PROCEDURE — 80197 ASSAY OF TACROLIMUS: CPT | Performed by: NURSE PRACTITIONER

## 2018-09-19 PROCEDURE — A9270 NON-COVERED ITEM OR SERVICE: HCPCS | Mod: GY | Performed by: NURSE PRACTITIONER

## 2018-09-19 PROCEDURE — 25000132 ZZH RX MED GY IP 250 OP 250 PS 637: Mod: GY | Performed by: INTERNAL MEDICINE

## 2018-09-19 PROCEDURE — 40000193 ZZH STATISTIC PT WARD VISIT: Performed by: PHYSICAL THERAPIST

## 2018-09-19 PROCEDURE — 88305 TISSUE EXAM BY PATHOLOGIST: CPT | Mod: 26 | Performed by: NURSE PRACTITIONER

## 2018-09-19 PROCEDURE — 25000125 ZZHC RX 250: Performed by: NURSE PRACTITIONER

## 2018-09-19 PROCEDURE — 87205 SMEAR GRAM STAIN: CPT | Performed by: NURSE PRACTITIONER

## 2018-09-19 PROCEDURE — 94642 AEROSOL INHALATION TREATMENT: CPT

## 2018-09-19 PROCEDURE — 97116 GAIT TRAINING THERAPY: CPT | Mod: GP | Performed by: PHYSICAL THERAPIST

## 2018-09-19 PROCEDURE — 99308 SBSQ NF CARE LOW MDM 20: CPT | Performed by: NURSE PRACTITIONER

## 2018-09-19 PROCEDURE — 94640 AIRWAY INHALATION TREATMENT: CPT

## 2018-09-19 PROCEDURE — 25000131 ZZH RX MED GY IP 250 OP 636 PS 637: Mod: GY | Performed by: INTERNAL MEDICINE

## 2018-09-19 PROCEDURE — 97110 THERAPEUTIC EXERCISES: CPT | Mod: GP | Performed by: PHYSICAL THERAPIST

## 2018-09-19 PROCEDURE — A9270 NON-COVERED ITEM OR SERVICE: HCPCS | Mod: GY | Performed by: PHYSICIAN ASSISTANT

## 2018-09-19 PROCEDURE — 87186 SC STD MICRODIL/AGAR DIL: CPT | Performed by: NURSE PRACTITIONER

## 2018-09-19 PROCEDURE — 88305 TISSUE EXAM BY PATHOLOGIST: CPT | Performed by: NURSE PRACTITIONER

## 2018-09-19 RX ORDER — PANTOPRAZOLE SODIUM 40 MG/1
40 TABLET, DELAYED RELEASE ORAL
Status: DISCONTINUED | OUTPATIENT
Start: 2018-09-20 | End: 2018-09-26 | Stop reason: HOSPADM

## 2018-09-19 RX ORDER — PENTAMIDINE ISETHIONATE 300 MG/300MG
300 INHALANT RESPIRATORY (INHALATION) ONCE
Status: COMPLETED | OUTPATIENT
Start: 2018-09-19 | End: 2018-09-19

## 2018-09-19 RX ORDER — ALBUTEROL SULFATE 0.83 MG/ML
SOLUTION RESPIRATORY (INHALATION)
Status: COMPLETED
Start: 2018-09-19 | End: 2018-09-19

## 2018-09-19 RX ADMIN — MAGNESIUM OXIDE TAB 400 MG (241.3 MG ELEMENTAL MG) 400 MG: 400 (241.3 MG) TAB at 08:10

## 2018-09-19 RX ADMIN — HYDRALAZINE HYDROCHLORIDE 100 MG: 50 TABLET ORAL at 13:52

## 2018-09-19 RX ADMIN — AMLODIPINE BESYLATE 10 MG: 5 TABLET ORAL at 08:10

## 2018-09-19 RX ADMIN — NYSTATIN 1000000 UNITS: 100000 SUSPENSION ORAL at 22:15

## 2018-09-19 RX ADMIN — PENTAMIDINE ISETHIONATE 300 MG: 300 INHALANT RESPIRATORY (INHALATION) at 17:23

## 2018-09-19 RX ADMIN — Medication 1 CAPSULE: at 08:10

## 2018-09-19 RX ADMIN — ANALGESIC BALM: 1.74; 4.06 OINTMENT TOPICAL at 08:14

## 2018-09-19 RX ADMIN — LISINOPRIL 5 MG: 2.5 TABLET ORAL at 08:10

## 2018-09-19 RX ADMIN — PREDNISONE 5 MG: 5 TABLET ORAL at 08:10

## 2018-09-19 RX ADMIN — ALBUTEROL SULFATE 2.5 MG: 2.5 SOLUTION RESPIRATORY (INHALATION) at 17:13

## 2018-09-19 RX ADMIN — NYSTATIN 1000000 UNITS: 100000 SUSPENSION ORAL at 19:00

## 2018-09-19 RX ADMIN — INSULIN ASPART 1 UNITS: 100 INJECTION, SOLUTION INTRAVENOUS; SUBCUTANEOUS at 19:00

## 2018-09-19 RX ADMIN — NYSTATIN 1000000 UNITS: 100000 SUSPENSION ORAL at 13:51

## 2018-09-19 RX ADMIN — NYSTATIN 1000000 UNITS: 100000 SUSPENSION ORAL at 08:09

## 2018-09-19 RX ADMIN — TACROLIMUS 2 MG: 1 CAPSULE ORAL at 08:10

## 2018-09-19 RX ADMIN — PANTOPRAZOLE SODIUM 40 MG: 40 TABLET, DELAYED RELEASE ORAL at 08:10

## 2018-09-19 RX ADMIN — ATORVASTATIN CALCIUM 20 MG: 20 TABLET, FILM COATED ORAL at 22:15

## 2018-09-19 RX ADMIN — HYDRALAZINE HYDROCHLORIDE 100 MG: 50 TABLET ORAL at 22:15

## 2018-09-19 RX ADMIN — PREDNISONE 5 MG: 5 TABLET ORAL at 19:00

## 2018-09-19 RX ADMIN — ASPIRIN 81 MG: 81 TABLET, COATED ORAL at 08:10

## 2018-09-19 RX ADMIN — HYDRALAZINE HYDROCHLORIDE 100 MG: 50 TABLET ORAL at 06:52

## 2018-09-19 RX ADMIN — TACROLIMUS 2 MG: 1 CAPSULE ORAL at 22:14

## 2018-09-19 NOTE — CONSULTS
[facility]   Cardiology II Consult/ Advanced Heart Failure  Daily Progress Note  Date of Service: 9/19/2018      Patient: Murray Nicholson  MRN: [id number]  Admission Date: 9/11/2018  Hospital Day # 8    Assessment and Plan: Murray Nicholson is a 63 year old male with a PMH of NICM s/p OHT 6/14/18, ESRD on HD, RIJ thrombus, and DM Type II. He underwent hospitalization for  hematochezia secondary to colonic abscess, rectal abscess, and colitis and is now s/p sigmoid colectomy with colostomy. He transferred to rehab for further therapy.     Fever. Tmax in the last 48 hours 101. Chest X-ray concerning for new infiltrates. CT Chest/Abd/Pelvis consistent with concern for pulmonary edema, ascites, pulmonary nodules, and gastritis/duodenitis. UA negative. No current symptoms consistent with infectious etiology.   - CMV quant negative, no prior colonic biopsy. No GI symptoms, defer CDIF.   - CRP trending upward.   - Blood cultures NTD. No cultures obtained from HD line.   - Case reviewed with CRS. No acute surgical concern for infectious etiology. Consider further follow up with GI for upper endoscopy. IM discussed with GI, recommend outpatient eval.   - Appreciate ID input. Defer antibiotics at this time.   - ENT consult appreciated for further evaluation of progressive oral ulcer. CT pending. HSV culture pending.       S/p OHT. 6/14/18 secondary to NICM. Echo 7/20/18 with normal graft function. RHC 9/5/18 with mRA-4, mPA-20, mPCW-10, BECKA CO-10, and BECKA CI-5 with biopsy negative.   Serostatus: HSV1-, HSV1+, CMV D+/R-, EBV D?/R+, VZV+.  Immunosuppression: Prednisone 5 mg po BID. Tac level pending. Goal-6-8.   Prophylaxis:Nystatin. Pentamidine last given 8/17 with repeat dose due 9/14. Give today. Valcyte discontinued, weekly CMV negative. Cellcept discontinued in setting of leukopenia.   - Continue Lipitor.   - ASA 81 mg po daily.   - Next RHC and biopsy due 10/8/18.      HTN. Lisinopril held 8/6/18 due to concern  for anemia exacerbation.   - Hydralazine 100 mg po TID and Norvasc 10 mg po daily. Meds held in AM for HD. Continue to trend.       ESRD on HD. Remains inactive on renal transplant list.   - Appreciate Nephrology consult with HD every TTS.       Pericolonic abscess, rectal abscess, and Colitis. S/p laparoscopic sigmoid colectomy with end colostomy 8/14 and drain to rectal abscess 8/12. Biopsy positive for VRE. Completed Cipro and Flagyl course 8/14-8/27. CT enterography 8/30 negative for abscess with improvement in dilated bowel. ID and CRS signed off. Blood cultures negative.   - Miralax daily prn with Low residue diet.   - Reveiwed most recent CT findings with CRS team this AM. No acute intervention indicated, GI outpatient follow up unless acute abdominal symptoms occur.       Right internal jugular thrombus. Last noted per US 8/12/18, nonocclusive. Repeat US 9/5/18 notes nonocclusive thrombus to the lower right internal jugular.    - Repeat US in 2 weeks.   ================================================================    Interval History/ROS: He complains of LE edema with chills overnight. He denies lightheadedness, dizziness, chest pain, palpitations, cough, MEADE, SOB, nausea, vomiting, diarrhea, abdominal pain, rectal pain/drainage, hematochezia, hematemesis, and melena. He is tolerating po intake and therapy well.     Last 24 hr care team notes reviewed.   ROS:  4 point ROS including Respiratory, CV, GI and , other than that noted in the HPI, is negative.     Medications: Reviewed in EPIC.     Physical Exam:   /74 (BP Location: Right arm)  Pulse 99  Temp 99.5  F (37.5  C) (Oral)  Resp 18  Wt 79.9 kg (176 lb 1.6 oz)  SpO2 100%  BMI 26.01 kg/m2  GENERAL: Appears alert and oriented times three.   HEENT: Eye symmetrical and free of discharge bilaterally. Mucous membranes moist with large 3 cm cauliflower lesion to upper palate.   NECK: Supple and without lymphadenopathy. JVD 8 cm.   CV: RRR, S1S2  present without murmur, rub, or gallop.   RESPIRATORY: Respirations regular, even, and unlabored. Lungs CTA throughout.   GI: Soft and non distended with normoactive bowel sounds present in all quadrants. No tenderness, rebound, guarding. No organomegaly.   EXTREMITIES: +1 bilateral peripheral edema. 2+ bilateral pedal pulses.   NEUROLOGIC: Alert and orientated x 3. CN II-XII grossly intact. No focal deficits.   MUSCULOSKELETAL: No joint swelling or tenderness.   SKIN: No jaundice. No rashes or lesions.     Data:  CMP  Recent Labs  Lab 09/17/18  0925 09/15/18  0603 09/14/18  0919     --  139   POTASSIUM 3.6  --  3.8   CHLORIDE 101  --  101   CO2 25  --  28   ANIONGAP 12  --  10   *  --  148*   BUN 24  --  13   CR 4.61*  --  3.47*   GFRESTIMATED 13*  --  18*   GFRESTBLACK 16*  --  22*   TRINI 8.4*  --  8.2*   MAG  --  2.1 1.6     CBC  Recent Labs  Lab 09/19/18  0909 09/18/18  0646 09/17/18  0925 09/14/18  0919   WBC 3.8* 3.7* 4.4 2.2*   RBC 2.98* 2.65* 3.00* 3.14*   HGB 9.5* 8.5* 9.6* 10.1*   HCT 30.3* 27.4* 31.1* 32.1*   * 103* 104* 102*   MCH 31.9 32.1 32.0 32.2   MCHC 31.4* 31.0* 30.9* 31.5   RDW 19.9* 20.3* 20.8* 21.6*    208 254 247         Jennifer Eldridgemarthaluci [facility]  9/19/2018

## 2018-09-19 NOTE — CONSULTS
ENT Inpatient note:    [facility] History and Physical    Murray Nicholson MRN# [id number]   Age: 63 year old YOB: 1955     Date of Admission:  9/11/2018      Primary care provider: Yahir Turcios  Consulting: Marianne Peralta NP         Assessment and Plan:   Assessment:   Oral lesion    Patient Active Problem List    Diagnosis Date Noted     Sigmoid diverticulitis 08/14/2018     Priority: Medium     Bacteremia due to Pseudomonas 08/12/2018     Priority: Medium     Heart transplant recipient (H) 07/26/2018     Priority: Medium     Fever 07/26/2018     Priority: Medium     Leukopenia 07/11/2018     Priority: Medium     Heart transplanted (H) 06/15/2018     Priority: Medium     HRD    Donor CMV + / Recipient CMV -    Donor EBV +        Anemia in stage 5 chronic kidney disease (H) 03/17/2017     Priority: Medium     Anemia, iron deficiency 02/15/2017     Priority: Medium     Type 2 diabetes mellitus with diabetic chronic kidney disease (H) 10/14/2015     Priority: Medium     Hypertension      Priority: Medium     Dyslipidemia      Priority: Medium     MGUS (monoclonal gammopathy of unknown significance)      Priority: Medium     Ascending aortic aneurysm (H)      Priority: Medium     Anemia in chronic renal disease 05/19/2015     Priority: Medium     updating diagnosis code for icd10 cutover       Anemia of chronic disease 04/12/2013     Priority: Medium     Problem list name updated by automated process. Provider to review and confirm       Hypertensive cardiopathy 08/24/2011     Priority: Medium     Hypertension goal BP (blood pressure) < 130/80 01/25/2011     Priority: Medium     Hyperlipidemia LDL goal <100 10/31/2010     Priority: Medium     Per provider       CKD (chronic kidney disease) stage 5, GFR less than 15 ml/min (H) 02/09/2010     Priority: Medium     Allergic rhinitis 04/05/2006     Priority: Medium     Problem list name updated by automated process. Provider to review        Hypersomnia with sleep apnea 08/18/2005     Priority: Medium     Problem list name updated by automated process. Provider to review       Esophageal reflux 08/12/2004     Priority: Medium         Plan:  The patient's oral lesion was biopsied at bedside today.  Piece of tissue was also sampled for culture.  It was cauterized with silver nitrate that stains tissue in the area and on the right tongue a black color.    CT maxillofacial bones without contrast was ordered.      The patient relates a history of the lesion decreasing and then increasing in size.  It is not painful and in fact is anesthetic. It does not have the typical characteristics of an oral squamous cell carcinoma. He does not have a history of tobacco, alcohol, or chewing tobacco use. 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 It does not have the characteristic of other benign inflammatory lesions of the hard palate.    The necrotic lesion on the hard palate presented after surgery requiring intubation. No documentation provided on previous size. The patient does have diabetes and is also immunosuppressed due to heart transplant.  He may have osteomyelitis of the hard palate, which might explain his increasing CRP and ESR.     We will follow up with the patient with the results of the pathology and CT scan.           Procedures 9/19/18: oral cavity: Hard palate biopsy   Diagnosis: hard palate lesion  Surgeon: Tequila Villegas DO    Procedure indications: patient has a non-healing lesion of the hard palate with ulcerative and necrotic characteristics.    Procedure: The area was anesthetic to palpation. A piece of viable tissue was grasped with a cup biting foceps. It was placed in formalin for routine pathology. A piece was also sent for culture.  The area of biopsy was cauterized with silver nitrate without difficulty. The patient tolerated the procedure well and there were no complications.               Chief Complaint:      oral lesion      "  History is obtained from the patient          History of Present Illness:   This patient is a 63 year old male  who presents for ENT evaluation secondary to oral lesion on the hard palate.  The ulcer has been present since after the heart transplant surgery June 2018. He does not recall the exact duration. The lesion had been approximately the same size since onset, but in the last 2 days, he noticed another \"chunk\" of tissue that seemed larger. The patient states that the lesion is not painful, does not bleed.    The medical team notes fevers over the course of the last 1-2 days up to 101.2  They have not been able to determine a source for his fevers, but he has had C. difficile recently.  Workup included CT of the chest and abdomen.  He has been afebrile for 24 hours now.    He denies tobacco, chewing tobacco, and alcohol use.  He denies previous lesion in the area.  He has not had any topical treatments for the lesion.  He has not had a previous biopsy.  A culture was taken with a swab of the lesion, which showed pseudomonal bacteria as well as general oral tiffany.  He has been on cefepime in the past for supposed pseudomonal infection and bacteremia, however, he has not been on any antibiotics for this lesion recently.  Infectious disease did not recommend further antibiotics at the time of their last evaluation.  He is on oral Bactrim prophylactically for PCP.    He has also recently had perforated diverticulitis and intra-abdominal and perirectal abscesses which were subsequently treated with end colostomy and drainage last admission. This admission he has been diagnosed with recalcitrant C diff colitis and has been taking oral Flagyl and vancomycin.            Past Medical History:     Past Medical History:   Diagnosis Date     (HFpEF) heart failure with preserved ejection fraction (H)      Allergic rhinitis, cause unspecified      Anemia of chronic kidney failure      AS (aortic stenosis)      Ascending " aortic aneurysm (H)      Bicuspid aortic valve      CAD (coronary artery disease)      Chronic kidney disease, stage 5 (H)      Congestive heart failure, unspecified      Dyslipidemia      Esophageal reflux      ESRD (end stage renal disease) (H)      Hypersomnia with sleep apnea, unspecified      Hypertension      MGUS (monoclonal gammopathy of unknown significance)      Mitral regurgitation      SHEELA (obstructive sleep apnea)      Systolic heart failure (H)      Type 2 diabetes mellitus (H)              Past Surgical History:     Past Surgical History:   Procedure Laterality Date     COLONOSCOPY N/A 5/3/2018    Procedure: COLONOSCOPY;  colonoscopy ;  Surgeon: Ammon Castillo MD;  Location: [facility]     ESOPHAGOSCOPY, GASTROSCOPY, DUODENOSCOPY (EGD), COMBINED N/A 5/7/2018    Procedure: COMBINED ENDOSCOPIC ULTRASOUND, ESOPHAGOSCOPY, GASTROSCOPY, DUODENOSCOPY (EGD), FINE NEEDLE ASPIRATE/BIOPSY;  Endoscopic Ultrasound with Fine Needle Aspiration ;  Surgeon: Alon Don MD;  Location: [facility]     EXAM UNDER ANESTHESIA RECTUM N/A 8/12/2018    Procedure: EXAM UNDER ANESTHESIA RECTUM;  EXAM UNDER ANESTHESIA RECTUM ,COMBINED INCISION AND DRAINAGE OF RECTAL ABCESS ;  Surgeon: Rick Tran MD;  Location: [facility]     INCISION AND DRAINAGE RECTUM, COMBINED N/A 8/12/2018    Procedure: COMBINED INCISION AND DRAINAGE RECTUM;;  Surgeon: Rick Tran MD;  Location: [facility]     LAPAROSCOPIC ASSISTED SIGMOID COLECTOMY N/A 8/14/2018    Procedure: LAPAROSCOPIC ASSISTED SIGMOID COLECTOMY;  Laparoscopic Hand Assisted Takedown of Splenic Flexure, Sigmoidectomy, Small Bowel Resection, Takedown of Small Bowel to Colon Fistula;  Surgeon: Rick Tran MD;  Location: [facility]     LAPAROSCOPIC HERNIORRHAPHY INGUINAL BILATERAL Bilateral 7/24/2015    Procedure: LAPAROSCOPIC HERNIORRHAPHY INGUINAL BILATERAL;  Surgeon: Bobby Mcconnell MD;  Location: [facility]     LAPAROSCOPIC INSERTION CATHETER PERITONEAL  DIALYSIS N/A 2017    Procedure: LAPAROSCOPIC INSERTION CATHETER PERITONEAL DIALYSIS;  Laparoscopic Peritoneal Dialysis Catheter Placement - Anesthesia with block;  Surgeon: Esteban Arvizu MD;  Location: [facility]     PICC INSERTION Left 2018    5Fr - 49cm (3cm external), Basilic vein, low SVC     REMOVE CATHETER PERITONEAL Right 1/15/2018    Procedure: REMOVE CATHETER PERITONEAL;  Open Removal of Peritoneal Dialysis Catheter ;  Surgeon: Esteban Arvizu MD;  Location: [facility]     TRANSPLANT HEART RECIPIENT N/A 2018    Procedure: TRANSPLANT HEART RECIPIENT;  Median Sternotomy, on-pump oxygenator, Heart Transplant;  Surgeon: Rony Caputo MD;  Location: [facility]             Social History:     Social History   Substance Use Topics     Smoking status: Former Smoker     Packs/day: 1.00     Years: 19.00     Types: Cigarettes     Quit date: 1994     Smokeless tobacco: Never Used     Alcohol use No             Family History:     Family History   Problem Relation Age of Onset     C.A.D. Father       from-never knew father-age 60     Diabetes Father      Cerebrovascular Disease Father      Hypertension No family hx of      Breast Cancer No family hx of      Cancer - colorectal No family hx of      Prostate Cancer No family hx of      KIDNEY DISEASE No family hx of      Melanoma No family hx of      Skin Cancer No family hx of              Immunizations:     VACCINE/DOSE   Diptheria   DPT   DTAP   HBIG   Hepatitis A   Hepatitis B   HIB   Influenza   Measles   Meningococcal   MMR   Mumps   Pneumococcal   Polio   Rubella   Small Pox   TDAP   Varicella   Zoster             Allergies:     Allergies   Allergen Reactions     Norco [Hydrocodone-Acetaminophen] Nausea and Vomiting     Cats      Throat tightness     Isosorbide Other (See Comments)     hypotension     Penicillins Hives     Seasonal Allergies      rhinitis     Shrimp      Throat closes              Medications:     Current  Facility-Administered Medications   Medication     - Skin Test Reading -     acetaminophen (TYLENOL) Suppository 650 mg     acetaminophen (TYLENOL) tablet 650 mg     albuterol (PROAIR HFA/PROVENTIL HFA/VENTOLIN HFA) 108 (90 Base) MCG/ACT inhaler 2 puff     alum & mag hydroxide-simethicone (MYLANTA ES/MAALOX  ES) suspension 15 mL     amLODIPine (NORVASC) tablet 10 mg     aspirin EC tablet 81 mg     atorvastatin (LIPITOR) tablet 20 mg     benzocaine-menthol (CEPACOL) 15-3.6 MG lozenge 1 lozenge     glucose gel 15-30 g    Or     dextrose 50 % injection 25-50 mL    Or     glucagon injection 1 mg     eucerin cream     hydrALAZINE (APRESOLINE) tablet 100 mg     insulin aspart (NovoLOG) inj (RAPID ACTING)     insulin aspart (NovoLOG) inj (RAPID ACTING)     insulin isophane human (HumuLIN N PEN) injection 10 Units     insulin isophane human (HumuLIN N PEN) injection 8 Units     lisinopril (PRINIVIL/Zestril) tablet 5 mg     magnesium oxide (MAG-OX) tablet 400 mg     magnesium sulfate 2 g in water intermittent infusion     magnesium sulfate 4 g in 100 mL sterile water (premade)     medication instructions     methyl salicylate-menthol (FILI-MORAN) ointment     naloxone (NARCAN) injection 0.1-0.4 mg     NEPHROCAPS capsule 1 capsule     nystatin (MYCOSTATIN) suspension 1,000,000 Units     ondansetron (ZOFRAN-ODT) ODT tab 4 mg    Or     ondansetron (ZOFRAN) injection 4 mg     pantoprazole (PROTONIX) EC tablet 40 mg     pentamidine (NEBUPENT) neb solution 300 mg     predniSONE (DELTASONE) tablet 5 mg     sennosides (SENOKOT) tablet 1-2 tablet     tacrolimus (GENERIC EQUIVALENT) capsule 2 mg     traMADol (ULTRAM) half-tab 25-50 mg     tuberculin injection 5 Units             Review of Systems:   CONSTITUTIONAL: NEGATIVE for change in weight. + Fever, chills  INTEGUMENTARY/SKIN: NEGATIVE for worrisome rashes, moles or lesions  EYES: NEGATIVE for vision changes or irritation  ENT/MOUTH: NEGATIVE: positive for nasal drainage  (clear)  RESP: NEGATIVE for significant cough or SOB  BREAST: NEGATIVE for masses, tenderness or discharge  CV: NEGATIVE for chest pain, palpitations or peripheral edema  GI: recent c.diff colitis  : NEGATIVE for frequency, dysuria, or hematuria  MUSCULOSKELETAL: NEGATIVE for significant arthralgias or myalgia  NEURO: NEGATIVE for weakness, dizziness or paresthesias  ENDOCRINE: NEGATIVE for temperature intolerance, skin/hair changes  HEME: NEGATIVE for bleeding problems., on anticoagulation.  PSYCHIATRIC: NEGATIVE for changes in mood or affect              Physical Exam:   All vitals have been reviewed  Patient Vitals for the past 8 hrs:   BP Temp Temp src Pulse Resp SpO2   09/19/18 1111 - 98.4  F (36.9  C) Oral - - -   09/19/18 0912 145/74 - - 99 - -   09/19/18 0650 152/82 99.5  F (37.5  C) Oral 95 18 100 %     I/O last 3 completed shifts:  In: 120 [P.O.:120]  Out: -      Resp: 18      General: appearance is normal. A+O.  Head and face exam is normal.  Ability to communicate is normal.  Eyes: Appearance of the eyes is normal. Wears glasses.  Cranial nerves II-XII grossly intact.  Respirations: non-labored.  Peripheral perfusion pressure: normal.  Right ear: External exam normal, EAC patent, TM intact, ME air filled, hearing grossly normal.  Left ear: External exam normal, EAC patent, TM intact, ME air filled, hearing grossly normal.  Nose: External exam normal, Septum midline, inferior turbinates normal size. Mucosa normal. No visualized tract between nasal cavity and hard palate.  Oral: Lips and teeth within normal limits. Good denition, no necrotic teeth noted. 2 cm necrotic oral lesion on the anterior right hard palate adjacent to teeth number 5-9 and extending posteriorly to the middle of the hard palate. There is an overlying necrotic flap of muscoa and exposed bone of the hard palate, teeth roots,  and alveolar ridge.  No lesion of the tongue, floor of mouth, or buccal mucosa.  Posterior pharyngeal walls:  Normal.Tonsils 1+  Neck: no masses or lesions noted.  Lymph: No cervical lymphadenopathy.  Salivary: No salivary masses palpable.  Skin normal appearance.  Thyroid: not enlarged.              Data:   All laboratory data reviewed  Results for orders placed or performed during the hospital encounter of 09/11/18   XR Chest 2 Views    Narrative    Exam: XR CHEST 2 VW, 9/17/2018 3:10 PM    Indication: Fever, eval for PNA or infectious process;     Comparison: 7/26/2018    Findings:   PA and lateral views of the chest. Postsurgical changes of cardiac  transplantation, including median sternotomy wires. Tunneled right IJ  central venous catheter tip projects within the right atrium, stable.  Trachea is midline. Cardiomediastinal silhouette is unchanged. New  streaky left retrocardiac opacities. No pleural effusion or  pneumothorax. Visualized upper abdomen and osseous structures are  unremarkable.      Impression    Impression:   1. Streaky left retrocardiac opacities compatible with infection. No  pleural effusion.  2. Stable postoperative changes of cardiac transplantation.    I have personally reviewed the examination and initial interpretation  and I agree with the findings.    CLARICE VILLARREAL MD   CT Chest/Abdomen/Pelvis w Contrast    Narrative    EXAMINATION: CT CHEST/ABDOMEN/PELVIS W CONTRAST  9/18/2018 7:59 AM      CLINICAL HISTORY: New pulmonary nodules as well as new fevers (101  degrees today) s/p recent diverticulitis with daniella-rectal and colonic  abscesses s/p lap sigmoidectomy with end colostomy and SBR on 8/12 and  then takedown of small bowel-colonic fistula on 8/14    COMPARISON: CT Abdominal enteropathy dated 8/29/2018 , CT AP dated  8/18/2018, CT cap dated 7/26/2018      PROCEDURE COMMENTS: CT of the chest, abdomen, and pelvis was performed  84ml's isovue 370 intravenous and oral contrast. Axial MIP  images of  the chest, and coronal and sagittal reformatted images of the chest,  abdomen, and pelvis  obtained.    FINDINGS:      Chest:  Right IJ CVC with the tip at the high right atrium. The trachea and  central airways are clear.      There are patchy areas of groundglass attenuation in both lower lobes,  new since the prior. Additionally, there are relatively discrete  superimposed groundglass and solid nodules in both lungs. For example,  there is a stable 6 mm groundglass pulmonary nodule in the left lower  lobe along the left major fissure (image 40 series 8). New elongated 6  mm average diameter solid pulmonary nodule in the right lower lobe  (image 107 series 8) from 8/18/2018 and minimally decreased in size  from 8/29/2018; and new left lower lobe 3 mm solid pulmonary nodule  (image 64 series 8) from 8/18/2018. No pneumothorax. Small bilateral  pleural effusions.    Stable postoperative changes of heart transplant. Heart size is  slightly enlarged. There is no pericardial effusion.  Normal thoracic  vasculature. Mild atherosclerotic calcifications of thoracic aorta and  the coronary arteries. Unchanged subcentimeter mediastinal and hilum  lymphadenopathy.     Abdomen/pelvis:    Postoperative changes of segmental small bowel resection, partial  sigmoidectomy and left lower quadrant colostomy.     No bowel obstruction. Appendix is somewhat prominent, but there are no  convincing periappendiceal inflammatory changes to suggest acute  appendicitis.  Prominent mucosal enhancement and wall thickening in  the gastric pylorus, and mildly duodenum dilatation measures up to 4.0  cm with no identifiable distal obstruction. The remaining small bowel  and colon are normal in caliber. No free fluid. Trace perihepatic  ascites. Mid abdominal mesenteric haziness.    Liver: Scattered subcentimeter hypoattenuating lesions in the left  hepatic lobe (image 123 and 125 series 7), too small to characterize  and unchanged from most recent comparison study.     Gallbladder: No radiopaque gallstones. Mild pericholecystic  fluid.    Pancreas: Unchanged hypoattenuating lesion at the pancreatic neck and  tail, likely representing a side branch type IPMN; no worrisome  nodularity or enhancement. No solid pancreatic mass or pancreatic duct  dilatation.    Spleen: Not enlarged.    Adrenal glands: Within normal limits.    Urinary tract: Multiple fluid density renal lesions, the largest of  which measures up to 3.8 cm, likely represent simple cyst. There is no  evidence for hydronephrosis or hydroureter. Urinary bladder is  moderately distended with nonspecific wall thickening.    Reproductive organs: Within normal limits.    Vessels: No aneurysmal dilatation of the abdominal aorta.  The portal,  splenic, and superior mesenteric veins are patent.  The origins of the  celiac and superior mesenteric arteries are patent.    Lymph nodes: No lymphadenopathy.    Bones and the soft tissues: No aggressive osseous lesions. multilevel  degenerative changes of the thoracic and lumbar spine.    Diffuse anasarca.      Impression    IMPRESSION:  1.  Stable post operative changes of heart transplant. New left  greater than right lower lobe groundglass opacities, which may  represent infection/inflammatory changes, pulmonary edema, or drug  reaction.  2a. Several small scattered solid and groundglass pulmonary nodules,  indeterminate, several of which are unchanged from 8/18/2018 and  8/29/2018, at least one of which appears new. Attention on follow-up  would be beneficial.  2b. Trace unchanged pleural effusions.  3a. Postoperative changes of segmental small bowel resection,  sigmoidectomy and left lower quadrant colostomy.   3b. Prominent mucosal enhancement and wall thickening in the gastric  pylorus with adjacent inflammatory changes. Findings raise the  question and of gastritis/duodenitis.  4. Cystic pancreatic lesion, previously characterized as a side branch  type IPMN, stable.  5. Small ascites.  6. Diffuse anasarca.    I have personally reviewed  the examination and initial interpretation  and I agree with the findings.    ROSELYN TAVAREZ MD   Glucose by meter   Result Value Ref Range    Glucose 153 (H) 70 - 99 mg/dL   Glucose by meter   Result Value Ref Range    Glucose 180 (H) 70 - 99 mg/dL   Glucose by meter   Result Value Ref Range    Glucose 93 70 - 99 mg/dL   Glucose by meter   Result Value Ref Range    Glucose 145 (H) 70 - 99 mg/dL   Glucose by meter   Result Value Ref Range    Glucose 154 (H) 70 - 99 mg/dL   Glucose by meter   Result Value Ref Range    Glucose 160 (H) 70 - 99 mg/dL   Glucose by meter   Result Value Ref Range    Glucose 79 70 - 99 mg/dL   Glucose by meter   Result Value Ref Range    Glucose 166 (H) 70 - 99 mg/dL   Glucose by meter   Result Value Ref Range    Glucose 194 (H) 70 - 99 mg/dL   Basic metabolic panel   Result Value Ref Range    Sodium 139 133 - 144 mmol/L    Potassium 3.8 3.4 - 5.3 mmol/L    Chloride 101 94 - 109 mmol/L    Carbon Dioxide 28 20 - 32 mmol/L    Anion Gap 10 3 - 14 mmol/L    Glucose 148 (H) 70 - 99 mg/dL    Urea Nitrogen 13 7 - 30 mg/dL    Creatinine 3.47 (H) 0.66 - 1.25 mg/dL    GFR Estimate 18 (L) >60 mL/min/1.7m2    GFR Estimate If Black 22 (L) >60 mL/min/1.7m2    Calcium 8.2 (L) 8.5 - 10.1 mg/dL   CBC with platelets   Result Value Ref Range    WBC 2.2 (L) 4.0 - 11.0 10e9/L    RBC Count 3.14 (L) 4.4 - 5.9 10e12/L    Hemoglobin 10.1 (L) 13.3 - 17.7 g/dL    Hematocrit 32.1 (L) 40.0 - 53.0 %     (H) 78 - 100 fl    MCH 32.2 26.5 - 33.0 pg    MCHC 31.5 31.5 - 36.5 g/dL    RDW 21.6 (H) 10.0 - 15.0 %    Platelet Count 247 150 - 450 10e9/L   CMV DNA quantification   Result Value Ref Range    CMV DNA Quantitation Specimen Plasma     CMV Quant IU/mL CMV DNA Not Detected CMVND^CMV DNA Not Detected [IU]/mL    Log IU/mL of CMVQNT Not Calculated <2.1 [Log_IU]/mL   Tacrolimus level   Result Value Ref Range    Tacrolimus Last Dose Not Provided     Tacrolimus Level 6.0 5.0 - 15.0 ug/L   Magnesium   Result Value Ref  Range    Magnesium 1.6 1.6 - 2.3 mg/dL   WBC Differential   Result Value Ref Range    Diff Method Automated Method     % Neutrophils 56.6 %    % Lymphocytes 19.7 %    % Monocytes 21.5 %    % Eosinophils 0.9 %    % Basophils 0.4 %    % Immature Granulocytes 0.9 %    Nucleated RBCs 1 (H) 0 /100    Absolute Neutrophil 1.3 (L) 1.6 - 8.3 10e9/L    Absolute Lymphocytes 0.5 (L) 0.8 - 5.3 10e9/L    Absolute Monocytes 0.5 0.0 - 1.3 10e9/L    Absolute Eosinophils 0.0 0.0 - 0.7 10e9/L    Absolute Basophils 0.0 0.0 - 0.2 10e9/L    Abs Immature Granulocytes 0.0 0 - 0.4 10e9/L    Absolute Nucleated RBC 0.0     Anisocytosis Moderate     Polychromasia Slight     Macrocytes Present     Platelet Estimate Confirming automated cell count    Glucose by meter   Result Value Ref Range    Glucose 276 (H) 70 - 99 mg/dL   Glucose by meter   Result Value Ref Range    Glucose 162 (H) 70 - 99 mg/dL   Magnesium   Result Value Ref Range    Magnesium 2.1 1.6 - 2.3 mg/dL   Glucose by meter   Result Value Ref Range    Glucose 104 (H) 70 - 99 mg/dL   Glucose by meter   Result Value Ref Range    Glucose 137 (H) 70 - 99 mg/dL   Glucose by meter   Result Value Ref Range    Glucose 112 (H) 70 - 99 mg/dL   Glucose by meter   Result Value Ref Range    Glucose 73 70 - 99 mg/dL   Glucose by meter   Result Value Ref Range    Glucose 55 (L) 70 - 99 mg/dL   Glucose by meter   Result Value Ref Range    Glucose 63 (L) 70 - 99 mg/dL   Glucose by meter   Result Value Ref Range    Glucose 92 70 - 99 mg/dL   Glucose by meter   Result Value Ref Range    Glucose 103 (H) 70 - 99 mg/dL   Glucose by meter   Result Value Ref Range    Glucose 186 (H) 70 - 99 mg/dL   Glucose by meter   Result Value Ref Range    Glucose 65 (L) 70 - 99 mg/dL   Glucose by meter   Result Value Ref Range    Glucose 69 (L) 70 - 99 mg/dL   Glucose by meter   Result Value Ref Range    Glucose 114 (H) 70 - 99 mg/dL   UA with Microscopic reflex to Culture   Result Value Ref Range    Color Urine Yellow      Appearance Urine Clear     Glucose Urine 150 (A) NEG^Negative mg/dL    Bilirubin Urine Negative NEG^Negative    Ketones Urine Negative NEG^Negative mg/dL    Specific Gravity Urine 1.010 1.003 - 1.035    Blood Urine Negative NEG^Negative    pH Urine 7.5 (H) 5.0 - 7.0 pH    Protein Albumin Urine 100 (A) NEG^Negative mg/dL    Urobilinogen mg/dL Normal 0.0 - 2.0 mg/dL    Nitrite Urine Negative NEG^Negative    Leukocyte Esterase Urine Negative NEG^Negative    Source Unspecified Urine     WBC Urine 2 0 - 5 /HPF    RBC Urine 0 0 - 2 /HPF    Hyaline Casts 3 (H) 0 - 2 /LPF   Lactic acid level STAT for sepsis protocol   Result Value Ref Range    Lactate for Sepsis Protocol 1.3 0.7 - 2.0 mmol/L   Glucose by meter   Result Value Ref Range    Glucose 156 (H) 70 - 99 mg/dL   Glucose by meter   Result Value Ref Range    Glucose 113 (H) 70 - 99 mg/dL   Glucose by meter   Result Value Ref Range    Glucose 148 (H) 70 - 99 mg/dL   Basic metabolic panel   Result Value Ref Range    Sodium 138 133 - 144 mmol/L    Potassium 3.6 3.4 - 5.3 mmol/L    Chloride 101 94 - 109 mmol/L    Carbon Dioxide 25 20 - 32 mmol/L    Anion Gap 12 3 - 14 mmol/L    Glucose 124 (H) 70 - 99 mg/dL    Urea Nitrogen 24 7 - 30 mg/dL    Creatinine 4.61 (H) 0.66 - 1.25 mg/dL    GFR Estimate 13 (L) >60 mL/min/1.7m2    GFR Estimate If Black 16 (L) >60 mL/min/1.7m2    Calcium 8.4 (L) 8.5 - 10.1 mg/dL   CBC with platelets   Result Value Ref Range    WBC 4.4 4.0 - 11.0 10e9/L    RBC Count 3.00 (L) 4.4 - 5.9 10e12/L    Hemoglobin 9.6 (L) 13.3 - 17.7 g/dL    Hematocrit 31.1 (L) 40.0 - 53.0 %     (H) 78 - 100 fl    MCH 32.0 26.5 - 33.0 pg    MCHC 30.9 (L) 31.5 - 36.5 g/dL    RDW 20.8 (H) 10.0 - 15.0 %    Platelet Count 254 150 - 450 10e9/L   Tacrolimus level   Result Value Ref Range    Tacrolimus Last Dose Not Provided     Tacrolimus Level 5.8 5.0 - 15.0 ug/L   Glucose by meter   Result Value Ref Range    Glucose 105 (H) 70 - 99 mg/dL   Glucose by meter   Result  Value Ref Range    Glucose 94 70 - 99 mg/dL   CRP inflammation   Result Value Ref Range    CRP Inflammation 80.7 (H) 0.0 - 8.0 mg/L   WBC Differential   Result Value Ref Range    Diff Method Automated Method     % Neutrophils 84.2 %    % Lymphocytes 7.9 %    % Monocytes 7.0 %    % Eosinophils 0.2 %    % Basophils 0.2 %    % Immature Granulocytes 0.5 %    Absolute Neutrophil 3.7 1.6 - 8.3 10e9/L    Absolute Lymphocytes 0.4 (L) 0.8 - 5.3 10e9/L    Absolute Monocytes 0.3 0.0 - 1.3 10e9/L    Absolute Eosinophils 0.0 0.0 - 0.7 10e9/L    Absolute Basophils 0.0 0.0 - 0.2 10e9/L    Abs Immature Granulocytes 0.0 0 - 0.4 10e9/L   Erythrocyte sedimentation rate auto   Result Value Ref Range    Sed Rate 33 (H) 0 - 20 mm/h   Glucose by meter   Result Value Ref Range    Glucose 142 (H) 70 - 99 mg/dL   CRP inflammation   Result Value Ref Range    CRP Inflammation 119.0 (H) 0.0 - 8.0 mg/L   CBC with platelets differential   Result Value Ref Range    WBC 3.7 (L) 4.0 - 11.0 10e9/L    RBC Count 2.65 (L) 4.4 - 5.9 10e12/L    Hemoglobin 8.5 (L) 13.3 - 17.7 g/dL    Hematocrit 27.4 (L) 40.0 - 53.0 %     (H) 78 - 100 fl    MCH 32.1 26.5 - 33.0 pg    MCHC 31.0 (L) 31.5 - 36.5 g/dL    RDW 20.3 (H) 10.0 - 15.0 %    Platelet Count 208 150 - 450 10e9/L    Diff Method Automated Method     % Neutrophils 78.3 %    % Lymphocytes 6.4 %    % Monocytes 14.2 %    % Eosinophils 0.3 %    % Basophils 0.3 %    % Immature Granulocytes 0.5 %    Nucleated RBCs 0 0 /100    Absolute Neutrophil 2.9 1.6 - 8.3 10e9/L    Absolute Lymphocytes 0.2 (L) 0.8 - 5.3 10e9/L    Absolute Monocytes 0.5 0.0 - 1.3 10e9/L    Absolute Eosinophils 0.0 0.0 - 0.7 10e9/L    Absolute Basophils 0.0 0.0 - 0.2 10e9/L    Abs Immature Granulocytes 0.0 0 - 0.4 10e9/L    Absolute Nucleated RBC 0.0    CMV DNA quantification   Result Value Ref Range    CMV DNA Quantitation Specimen EDTA PLASMA     CMV Quant IU/mL CMV DNA Not Detected CMVND^CMV DNA Not Detected [IU]/mL    Log IU/mL of  CMVQNT Not Calculated <2.1 [Log_IU]/mL   Glucose by meter   Result Value Ref Range    Glucose 148 (H) 70 - 99 mg/dL   Glucose by meter   Result Value Ref Range    Glucose 168 (H) 70 - 99 mg/dL   Lactic acid level STAT for sepsis protocol   Result Value Ref Range    Lactate for Sepsis Protocol 0.6 (L) 0.7 - 2.0 mmol/L     *Note: Due to a large number of results and/or encounters for the requested time period, some results have not been displayed. A complete set of results can be found in Results Review.        Tequila Villegas, DO

## 2018-09-19 NOTE — PROGRESS NOTES
"On 9/18/18 ~ 1015, the patient came down the [address] to the nursing station and announced as he was walking to the elevator \"Murray Nicholson, leaving for dialysis.\" Both the charge nurse and another nurse attempted to stop him by telling him that his ride needed to come up to the unit. He became angry and stated he was late and fine by himself. I offered to walk him to his ride. We rode the elevator down to the street and I accompanied him to his friends van. He did say that he needed to take the ramp because he thought he would fall on the steps. I reminded him that for his own safety his friend needs to come up to the unit to get pt for dialysis. He agreed that would happen in the future.   "

## 2018-09-19 NOTE — PLAN OF CARE
Problem: TCU Patient Goals  Goal: TCU Plan of Care  The patient is alert and oriented x 3. VSS. Denies pain or discomfort. Appetite is good and involve in his care. Late supper and insulin for coverage administered late cause he ordered out. Chest wound is intact and open to air. Continue to monitor for comfort.

## 2018-09-19 NOTE — PLAN OF CARE
Problem: Goal Outcome Summary  Goal: Goal Outcome Summary  Outcome: No Change  Alert and oriented x4. VSS. He denies pain this shift. Denies SOB, denies CP. Appetite good this shift. Patient requested for boost supplement, writer left message for the nutritionist to address. BG this shift 70 and 102. Continue with current POC.

## 2018-09-19 NOTE — PLAN OF CARE
Problem: Goal Outcome Summary  Goal: Goal Outcome Summary  PT: Pt progressing with stair climbing today, 30 stairs with 1 rail, slow pace, increased time to take brief standing rest breaks.  Omni 8/10. Cont toward goals.

## 2018-09-19 NOTE — PLAN OF CARE
Problem: Goal Outcome Summary  Goal: Goal Outcome Summary  Outcome: No Change  RN: Pt requested not to be disturbed at noc as much as possible. Pt claimed sleeping very well when he woke up this am. Slightly elevated BP- scheduled Hydralazine given. Low grade temp this am but pt denies any new symptoms. IV abx on hold since yesterday per ID recommendation. Will update team. Previous diet reordered this am as pt very agitated while trying to order breakfast. Continue with [plan of care.

## 2018-09-20 LAB
BASOPHILS # BLD AUTO: 0 10E9/L (ref 0–0.2)
BASOPHILS NFR BLD AUTO: 0.4 %
COPATH REPORT: NORMAL
CRP SERPL-MCNC: 125 MG/L (ref 0–8)
DIFFERENTIAL METHOD BLD: ABNORMAL
EOSINOPHIL # BLD AUTO: 0 10E9/L (ref 0–0.7)
EOSINOPHIL NFR BLD AUTO: 0.4 %
ERYTHROCYTE [DISTWIDTH] IN BLOOD BY AUTOMATED COUNT: 19.8 % (ref 10–15)
GLUCOSE BLDC GLUCOMTR-MCNC: 137 MG/DL (ref 70–99)
GLUCOSE BLDC GLUCOMTR-MCNC: 148 MG/DL (ref 70–99)
GLUCOSE BLDC GLUCOMTR-MCNC: 227 MG/DL (ref 70–99)
HBV SURFACE AB SERPL IA-ACNC: 19.85 M[IU]/ML
HBV SURFACE AG SERPL QL IA: NONREACTIVE
HCT VFR BLD AUTO: 31.6 % (ref 40–53)
HCV AB SERPL QL IA: NONREACTIVE
HGB BLD-MCNC: 9.7 G/DL (ref 13.3–17.7)
HIV 1+2 AB+HIV1 P24 AG SERPL QL IA: NONREACTIVE
IMM GRANULOCYTES # BLD: 0 10E9/L (ref 0–0.4)
IMM GRANULOCYTES NFR BLD: 0 %
LYMPHOCYTES # BLD AUTO: 0.4 10E9/L (ref 0.8–5.3)
LYMPHOCYTES NFR BLD AUTO: 14.2 %
MCH RBC QN AUTO: 31.2 PG (ref 26.5–33)
MCHC RBC AUTO-ENTMCNC: 30.7 G/DL (ref 31.5–36.5)
MCV RBC AUTO: 102 FL (ref 78–100)
MONOCYTES # BLD AUTO: 0.4 10E9/L (ref 0–1.3)
MONOCYTES NFR BLD AUTO: 16.9 %
NEUTROPHILS # BLD AUTO: 1.8 10E9/L (ref 1.6–8.3)
NEUTROPHILS NFR BLD AUTO: 68.1 %
NRBC # BLD AUTO: 0 10*3/UL
NRBC BLD AUTO-RTO: 0 /100
PLATELET # BLD AUTO: 222 10E9/L (ref 150–450)
RBC # BLD AUTO: 3.11 10E12/L (ref 4.4–5.9)
WBC # BLD AUTO: 2.6 10E9/L (ref 4–11)

## 2018-09-20 PROCEDURE — 25000125 ZZHC RX 250: Performed by: INTERNAL MEDICINE

## 2018-09-20 PROCEDURE — 87389 HIV-1 AG W/HIV-1&-2 AB AG IA: CPT | Performed by: NURSE PRACTITIONER

## 2018-09-20 PROCEDURE — A9270 NON-COVERED ITEM OR SERVICE: HCPCS | Mod: GY | Performed by: NURSE PRACTITIONER

## 2018-09-20 PROCEDURE — 86140 C-REACTIVE PROTEIN: CPT | Performed by: NURSE PRACTITIONER

## 2018-09-20 PROCEDURE — 25000132 ZZH RX MED GY IP 250 OP 250 PS 637: Mod: GY | Performed by: PHYSICIAN ASSISTANT

## 2018-09-20 PROCEDURE — 25000132 ZZH RX MED GY IP 250 OP 250 PS 637: Mod: GY | Performed by: NURSE PRACTITIONER

## 2018-09-20 PROCEDURE — 25000132 ZZH RX MED GY IP 250 OP 250 PS 637: Mod: GY | Performed by: INTERNAL MEDICINE

## 2018-09-20 PROCEDURE — 12000022 ZZH R&B SNF

## 2018-09-20 PROCEDURE — 36415 COLL VENOUS BLD VENIPUNCTURE: CPT | Performed by: NURSE PRACTITIONER

## 2018-09-20 PROCEDURE — 86706 HEP B SURFACE ANTIBODY: CPT | Performed by: NURSE PRACTITIONER

## 2018-09-20 PROCEDURE — G0499 HEPB SCREEN HIGH RISK INDIV: HCPCS | Performed by: NURSE PRACTITIONER

## 2018-09-20 PROCEDURE — G0472 HEP C SCREEN HIGH RISK/OTHER: HCPCS | Performed by: NURSE PRACTITIONER

## 2018-09-20 PROCEDURE — 86704 HEP B CORE ANTIBODY TOTAL: CPT | Performed by: NURSE PRACTITIONER

## 2018-09-20 PROCEDURE — 97110 THERAPEUTIC EXERCISES: CPT | Mod: GP

## 2018-09-20 PROCEDURE — A9270 NON-COVERED ITEM OR SERVICE: HCPCS | Mod: GY | Performed by: PHYSICIAN ASSISTANT

## 2018-09-20 PROCEDURE — 00000146 ZZHCL STATISTIC GLUCOSE BY METER IP

## 2018-09-20 PROCEDURE — 40000193 ZZH STATISTIC PT WARD VISIT

## 2018-09-20 PROCEDURE — 25000131 ZZH RX MED GY IP 250 OP 636 PS 637: Mod: GY | Performed by: INTERNAL MEDICINE

## 2018-09-20 PROCEDURE — A9270 NON-COVERED ITEM OR SERVICE: HCPCS | Mod: GY | Performed by: INTERNAL MEDICINE

## 2018-09-20 PROCEDURE — 85025 COMPLETE CBC W/AUTO DIFF WBC: CPT | Performed by: NURSE PRACTITIONER

## 2018-09-20 PROCEDURE — 97116 GAIT TRAINING THERAPY: CPT | Mod: GP

## 2018-09-20 RX ADMIN — MAGNESIUM OXIDE TAB 400 MG (241.3 MG ELEMENTAL MG) 400 MG: 400 (241.3 MG) TAB at 08:21

## 2018-09-20 RX ADMIN — ATORVASTATIN CALCIUM 20 MG: 20 TABLET, FILM COATED ORAL at 21:22

## 2018-09-20 RX ADMIN — ANALGESIC BALM: 1.74; 4.06 OINTMENT TOPICAL at 08:20

## 2018-09-20 RX ADMIN — PANTOPRAZOLE SODIUM 40 MG: 40 TABLET, DELAYED RELEASE ORAL at 17:25

## 2018-09-20 RX ADMIN — TACROLIMUS 2 MG: 1 CAPSULE ORAL at 21:23

## 2018-09-20 RX ADMIN — Medication 1 CAPSULE: at 08:20

## 2018-09-20 RX ADMIN — INSULIN ASPART 2 UNITS: 100 INJECTION, SOLUTION INTRAVENOUS; SUBCUTANEOUS at 19:25

## 2018-09-20 RX ADMIN — PANTOPRAZOLE SODIUM 40 MG: 40 TABLET, DELAYED RELEASE ORAL at 08:21

## 2018-09-20 RX ADMIN — ANALGESIC BALM: 1.74; 4.06 OINTMENT TOPICAL at 19:25

## 2018-09-20 RX ADMIN — NYSTATIN 1000000 UNITS: 100000 SUSPENSION ORAL at 08:57

## 2018-09-20 RX ADMIN — PREDNISONE 5 MG: 5 TABLET ORAL at 17:25

## 2018-09-20 RX ADMIN — AMLODIPINE BESYLATE 10 MG: 5 TABLET ORAL at 08:21

## 2018-09-20 RX ADMIN — HYDRALAZINE HYDROCHLORIDE 100 MG: 50 TABLET ORAL at 21:22

## 2018-09-20 RX ADMIN — TACROLIMUS 2 MG: 1 CAPSULE ORAL at 08:20

## 2018-09-20 RX ADMIN — NYSTATIN 1000000 UNITS: 100000 SUSPENSION ORAL at 17:25

## 2018-09-20 RX ADMIN — NYSTATIN 1000000 UNITS: 100000 SUSPENSION ORAL at 21:23

## 2018-09-20 RX ADMIN — ASPIRIN 81 MG: 81 TABLET, COATED ORAL at 08:21

## 2018-09-20 RX ADMIN — LISINOPRIL 5 MG: 2.5 TABLET ORAL at 08:20

## 2018-09-20 RX ADMIN — PREDNISONE 5 MG: 5 TABLET ORAL at 08:21

## 2018-09-20 NOTE — PROGRESS NOTES
Brief Medicine Note    Chart reviewed, VSS.  Fever curve improved.  Discussed with cardiology PA today.  Dr. Keen is reaching out to ID to discuss whether ulcer should be treated with antibiotics, awaiting return call about this.  I will reach out to ENT tomorrow AM to discuss CT and whether they feel MRI is required at this time.  Otherwise stable, went to HD today which he tolerated.     Marianne Welsh, [facility]  Hospitalist Service  Pager [phone]

## 2018-09-20 NOTE — PROGRESS NOTES
Brief Medicine Note    Patient has been having intermittent low grade temperatures early this week.  Have been in close communication with ID and heart transplant team.  CXR with new infiltrates, however, asympptomatic.  CT C/A/P concerning for pulmonary edema, ascites, pulm nodules, gastritis and duodenitis.  UA negative.  No other symptoms c/w infectious etiology, however, has progressive oral ulcer which was the initial cause of his bacteremia.     Progressive oral ulcer; Hx of pseudomonas bacteremia; Intermittent fever- Bacteremia in July most likely 2/2 ulceration with cxs + for pseudomonas.  He was treated with Cefepime through 8/23 then ciprofloxacin.  On discharge from hospital, noted to be improved, however, on exam appears more progressive than prior and patient noted in past 2 days he feels it is larger.  Afebrile for 24 hours.  WBC 3.8 but with up trending inflammatory markers: CRP 80->119->121 and ESR 33.  ENT consulted today who notes that ulcer does communicate with bone which rasis suspicion for osteonecrosis vs osteomyelitis vs soft tissue infection 2/2 the ulcer.   - CT max/face obtained today per recommendations by ENT.  Discussed results with radiology staff: no e/o sinusitis and no bony erosions that would clearly be called osteomyelitis, hjowever, CT ma  not identify early osteomyelitis.  They recommended MRI if we are concerned--> will defer this to ENT and ID  - ENT will re-evaluate on Friday  - Wound cx and sens sent today   - Biopsy sent, will f/u results   - Continue peridex mouth rinses   - If febrile, will consider broad spectrum antibx   - CBC, CRP in AM tomorrow     Gastritis/duodenitis - Noted on CT A/P.  Discussed findings with Dr. Tran (CRS surgery) who feels anastamosis appears OK, most likely this is mucositis.  Discussed case with GI who recommended outpatient EGD as he is asymptomatic.  If he begins having melena or increasing abdomial pain, will consider EGD sooner. CMV not  detected, CRS staff felt imaging not c/w CMV colitis.   - Will refer to OP GI   - Increase PPI to BID     Immunosuppression - Tacrolimus level this AM 7.9 with goal 6-8 therefore no dose change today.    Marianne Welsh, [facility]  Hospitalist Service  Pager [phone]

## 2018-09-20 NOTE — PLAN OF CARE
Pt alert and oriented x 4. Able too make needs known. Up with SBA. No c/o pain or discomfort. Able to change  colostomy pouch independently. Call within reach.

## 2018-09-20 NOTE — PLAN OF CARE
Problem: Goal Outcome Summary  Goal: Goal Outcome Summary  Outcome: No Change  Patient left for dialysis this morning and he is not back yet,missed blood sugar check @ noon and his scheduled afternoon meds.Participated in therapies this morning.

## 2018-09-20 NOTE — PLAN OF CARE
Problem: Goal Outcome Summary  Goal: Goal Outcome Summary  Outcome: No Change  RN: Pt requested not to be disturbed at night. Per report, pt requesting to have a shower at 0630. NA flyer came to supervised pt while completing this task.Has Dialysis today. 0600 Hydralazine not given as pt takes this with him and takes at Dialysis. See EMAR. AM nurse informed. Continue with plan of care.

## 2018-09-20 NOTE — PROGRESS NOTES
CLINICAL NUTRITION SERVICES - REASSESSMENT NOTE     Nutrition Prescription    RECOMMENDATIONS FOR MDs/PROVIDERS TO ORDER:  None today    Malnutrition Status:    Unable to determine    Recommendations already ordered by Registered Dietitian (RD):  None today    Future/Additional Recommendations:  Assess if pt needs calcium/vitamin D, if remains on prednisone. Per nephrology post-op Tx, pt was getting calcium during dialysis and calcitriol during runs.      EVALUATION OF THE PROGRESS TOWARD GOALS   Diet: Mod CHO diet, Boost Plus qd    Intake: % of meals per flowsheet.        NEW FINDINGS   - Pt has lost 5 lbs since admission over the last 1 week. Previously gained ~10 lbs likely d/t fluid shifts surrounding HD.     MALNUTRITION  % Intake: Decreased intake does not meet criteria  % Weight Loss: Weight loss does not meet criteria  Subcutaneous Fat Loss: Unable to assess  Muscle Loss: Unable to assess  Fluid Accumulation/Edema: None noted  Malnutrition Diagnosis: Unable to determine    Previous Goals   Total avg nutritional intake to meet a minimum of 25 kcal/kg and 1.3 g PRO/kg daily (per dosing wt 80 kg).  Evaluation: Unable to evaluate, likely met    Previous Nutrition Diagnosis  Inadequate protein-energy intake related to fair appetite and increased needs as evidenced by pt most recently meeting 65% energy and 37% protein needs  Evaluation: Improving    CURRENT NUTRITION DIAGNOSIS  Predicted protein-energy intake related to variable appetite as evidenced by pt eating % of meals per flowsheet with potential for variation          INTERVENTIONS  Implementation  Discussed pts POC during interdisciplinary morning rounds    Goals  Patient to consume % of nutritionally adequate meal trays TID, or the equivalent with supplements/snacks.    Monitoring/Evaluation  Progress toward goals will be monitored and evaluated per protocol.      Henna Molina RD, LD  Unit Pager: [phone]

## 2018-09-21 ENCOUNTER — APPOINTMENT (OUTPATIENT)
Dept: LAB | Facility: CLINIC | Age: 63
End: 2018-09-21
Attending: HOSPITALIST
Payer: MEDICARE

## 2018-09-21 LAB
ANION GAP SERPL CALCULATED.3IONS-SCNC: 8 MMOL/L (ref 3–14)
BACTERIA SPEC CULT: ABNORMAL
BACTERIA SPEC CULT: ABNORMAL
BASOPHILS # BLD AUTO: 0 10E9/L (ref 0–0.2)
BASOPHILS NFR BLD AUTO: 0.5 %
BUN SERPL-MCNC: 14 MG/DL (ref 7–30)
CALCIUM SERPL-MCNC: 7.8 MG/DL (ref 8.5–10.1)
CHLORIDE SERPL-SCNC: 102 MMOL/L (ref 94–109)
CO2 SERPL-SCNC: 29 MMOL/L (ref 20–32)
CREAT SERPL-MCNC: 2.96 MG/DL (ref 0.66–1.25)
CRP SERPL-MCNC: 87.4 MG/L (ref 0–8)
DIFFERENTIAL METHOD BLD: ABNORMAL
EOSINOPHIL # BLD AUTO: 0 10E9/L (ref 0–0.7)
EOSINOPHIL NFR BLD AUTO: 0 %
ERYTHROCYTE [DISTWIDTH] IN BLOOD BY AUTOMATED COUNT: 19.5 % (ref 10–15)
GFR SERPL CREATININE-BSD FRML MDRD: 22 ML/MIN/1.7M2
GLUCOSE BLDC GLUCOMTR-MCNC: 116 MG/DL (ref 70–99)
GLUCOSE BLDC GLUCOMTR-MCNC: 159 MG/DL (ref 70–99)
GLUCOSE BLDC GLUCOMTR-MCNC: 172 MG/DL (ref 70–99)
GLUCOSE BLDC GLUCOMTR-MCNC: 256 MG/DL (ref 70–99)
GLUCOSE BLDC GLUCOMTR-MCNC: 62 MG/DL (ref 70–99)
GLUCOSE SERPL-MCNC: 75 MG/DL (ref 70–99)
HBV CORE AB SERPL QL IA: NONREACTIVE
HCT VFR BLD AUTO: 26.8 % (ref 40–53)
HGB BLD-MCNC: 8.3 G/DL (ref 13.3–17.7)
IMM GRANULOCYTES # BLD: 0 10E9/L (ref 0–0.4)
IMM GRANULOCYTES NFR BLD: 0.5 %
LYMPHOCYTES # BLD AUTO: 0.3 10E9/L (ref 0.8–5.3)
LYMPHOCYTES NFR BLD AUTO: 14.6 %
MCH RBC QN AUTO: 31.2 PG (ref 26.5–33)
MCHC RBC AUTO-ENTMCNC: 31 G/DL (ref 31.5–36.5)
MCV RBC AUTO: 101 FL (ref 78–100)
MONOCYTES # BLD AUTO: 0.5 10E9/L (ref 0–1.3)
MONOCYTES NFR BLD AUTO: 22.8 %
NEUTROPHILS # BLD AUTO: 1.4 10E9/L (ref 1.6–8.3)
NEUTROPHILS NFR BLD AUTO: 61.6 %
NRBC # BLD AUTO: 0 10*3/UL
NRBC BLD AUTO-RTO: 0 /100
PLATELET # BLD AUTO: 208 10E9/L (ref 150–450)
POTASSIUM SERPL-SCNC: 3.7 MMOL/L (ref 3.4–5.3)
RBC # BLD AUTO: 2.66 10E12/L (ref 4.4–5.9)
SODIUM SERPL-SCNC: 139 MMOL/L (ref 133–144)
SPECIMEN SOURCE: ABNORMAL
TACROLIMUS BLD-MCNC: 9.9 UG/L (ref 5–15)
TME LAST DOSE: NORMAL H
WBC # BLD AUTO: 2.2 10E9/L (ref 4–11)

## 2018-09-21 PROCEDURE — 80048 BASIC METABOLIC PNL TOTAL CA: CPT | Performed by: NURSE PRACTITIONER

## 2018-09-21 PROCEDURE — 00000146 ZZHCL STATISTIC GLUCOSE BY METER IP

## 2018-09-21 PROCEDURE — 25000132 ZZH RX MED GY IP 250 OP 250 PS 637: Mod: GY | Performed by: PHYSICIAN ASSISTANT

## 2018-09-21 PROCEDURE — 99310 SBSQ NF CARE HIGH MDM 45: CPT | Performed by: NURSE PRACTITIONER

## 2018-09-21 PROCEDURE — 40000193 ZZH STATISTIC PT WARD VISIT

## 2018-09-21 PROCEDURE — 85004 AUTOMATED DIFF WBC COUNT: CPT | Performed by: NURSE PRACTITIONER

## 2018-09-21 PROCEDURE — 25000125 ZZHC RX 250: Performed by: INTERNAL MEDICINE

## 2018-09-21 PROCEDURE — A9270 NON-COVERED ITEM OR SERVICE: HCPCS | Mod: GY | Performed by: NURSE PRACTITIONER

## 2018-09-21 PROCEDURE — 25000132 ZZH RX MED GY IP 250 OP 250 PS 637: Mod: GY | Performed by: NURSE PRACTITIONER

## 2018-09-21 PROCEDURE — 25000128 H RX IP 250 OP 636: Performed by: NURSE PRACTITIONER

## 2018-09-21 PROCEDURE — 87529 HSV DNA AMP PROBE: CPT | Performed by: NURSE PRACTITIONER

## 2018-09-21 PROCEDURE — 36415 COLL VENOUS BLD VENIPUNCTURE: CPT | Performed by: NURSE PRACTITIONER

## 2018-09-21 PROCEDURE — 85027 COMPLETE CBC AUTOMATED: CPT | Performed by: NURSE PRACTITIONER

## 2018-09-21 PROCEDURE — 99308 SBSQ NF CARE LOW MDM 20: CPT | Performed by: NURSE PRACTITIONER

## 2018-09-21 PROCEDURE — 99207 ZZC CDG-MDM COMPONENT: MEETS MODERATE - UP CODED: CPT | Performed by: NURSE PRACTITIONER

## 2018-09-21 PROCEDURE — G0463 HOSPITAL OUTPT CLINIC VISIT: HCPCS

## 2018-09-21 PROCEDURE — A9270 NON-COVERED ITEM OR SERVICE: HCPCS | Mod: GY | Performed by: PHYSICIAN ASSISTANT

## 2018-09-21 PROCEDURE — 80197 ASSAY OF TACROLIMUS: CPT | Performed by: PHYSICIAN ASSISTANT

## 2018-09-21 PROCEDURE — 12000022 ZZH R&B SNF

## 2018-09-21 PROCEDURE — 86140 C-REACTIVE PROTEIN: CPT | Performed by: NURSE PRACTITIONER

## 2018-09-21 PROCEDURE — A9270 NON-COVERED ITEM OR SERVICE: HCPCS | Mod: GY | Performed by: INTERNAL MEDICINE

## 2018-09-21 PROCEDURE — 97110 THERAPEUTIC EXERCISES: CPT | Mod: GP

## 2018-09-21 PROCEDURE — 97116 GAIT TRAINING THERAPY: CPT | Mod: GP

## 2018-09-21 PROCEDURE — 25000132 ZZH RX MED GY IP 250 OP 250 PS 637: Mod: GY | Performed by: INTERNAL MEDICINE

## 2018-09-21 PROCEDURE — 25000131 ZZH RX MED GY IP 250 OP 636 PS 637: Mod: GY | Performed by: INTERNAL MEDICINE

## 2018-09-21 RX ORDER — CHLORHEXIDINE GLUCONATE ORAL RINSE 1.2 MG/ML
15 SOLUTION DENTAL 2 TIMES DAILY
Status: DISCONTINUED | OUTPATIENT
Start: 2018-09-21 | End: 2018-09-26 | Stop reason: HOSPADM

## 2018-09-21 RX ORDER — NYSTATIN 100000/ML
1000000 SUSPENSION, ORAL (FINAL DOSE FORM) ORAL 4 TIMES DAILY
Status: DISCONTINUED | OUTPATIENT
Start: 2018-09-25 | End: 2018-09-26 | Stop reason: HOSPADM

## 2018-09-21 RX ORDER — CIPROFLOXACIN 2 MG/ML
400 INJECTION, SOLUTION INTRAVENOUS EVERY 24 HOURS
Status: DISCONTINUED | OUTPATIENT
Start: 2018-09-21 | End: 2018-09-25

## 2018-09-21 RX ADMIN — PANTOPRAZOLE SODIUM 40 MG: 40 TABLET, DELAYED RELEASE ORAL at 08:24

## 2018-09-21 RX ADMIN — CLOTRIMAZOLE 1 TROCHE: 10 LOZENGE ORAL at 18:47

## 2018-09-21 RX ADMIN — HYDRALAZINE HYDROCHLORIDE 100 MG: 50 TABLET ORAL at 06:40

## 2018-09-21 RX ADMIN — TACROLIMUS 2 MG: 1 CAPSULE ORAL at 21:51

## 2018-09-21 RX ADMIN — INSULIN ASPART 3 UNITS: 100 INJECTION, SOLUTION INTRAVENOUS; SUBCUTANEOUS at 17:56

## 2018-09-21 RX ADMIN — HYDRALAZINE HYDROCHLORIDE 100 MG: 50 TABLET ORAL at 14:08

## 2018-09-21 RX ADMIN — Medication 1 CAPSULE: at 08:23

## 2018-09-21 RX ADMIN — PREDNISONE 5 MG: 5 TABLET ORAL at 17:57

## 2018-09-21 RX ADMIN — ATORVASTATIN CALCIUM 20 MG: 20 TABLET, FILM COATED ORAL at 21:49

## 2018-09-21 RX ADMIN — PREDNISONE 5 MG: 5 TABLET ORAL at 08:25

## 2018-09-21 RX ADMIN — PANTOPRAZOLE SODIUM 40 MG: 40 TABLET, DELAYED RELEASE ORAL at 16:38

## 2018-09-21 RX ADMIN — NYSTATIN 1000000 UNITS: 100000 SUSPENSION ORAL at 12:53

## 2018-09-21 RX ADMIN — CHLORHEXIDINE GLUCONATE 0.12% ORAL RINSE 15 ML: 1.2 LIQUID ORAL at 21:49

## 2018-09-21 RX ADMIN — ANALGESIC BALM: 1.74; 4.06 OINTMENT TOPICAL at 21:51

## 2018-09-21 RX ADMIN — NYSTATIN 1000000 UNITS: 100000 SUSPENSION ORAL at 16:38

## 2018-09-21 RX ADMIN — ASPIRIN 81 MG: 81 TABLET, COATED ORAL at 08:24

## 2018-09-21 RX ADMIN — TACROLIMUS 2 MG: 1 CAPSULE ORAL at 08:23

## 2018-09-21 RX ADMIN — ANALGESIC BALM: 1.74; 4.06 OINTMENT TOPICAL at 08:25

## 2018-09-21 RX ADMIN — CLOTRIMAZOLE 1 TROCHE: 10 LOZENGE ORAL at 21:50

## 2018-09-21 RX ADMIN — MAGNESIUM OXIDE TAB 400 MG (241.3 MG ELEMENTAL MG) 400 MG: 400 (241.3 MG) TAB at 08:25

## 2018-09-21 RX ADMIN — CIPROFLOXACIN 400 MG: 2 INJECTION, SOLUTION INTRAVENOUS at 18:47

## 2018-09-21 RX ADMIN — NYSTATIN 1000000 UNITS: 100000 SUSPENSION ORAL at 08:23

## 2018-09-21 RX ADMIN — HYDRALAZINE HYDROCHLORIDE 100 MG: 50 TABLET ORAL at 21:51

## 2018-09-21 RX ADMIN — AMLODIPINE BESYLATE 10 MG: 5 TABLET ORAL at 08:24

## 2018-09-21 RX ADMIN — LISINOPRIL 5 MG: 2.5 TABLET ORAL at 08:24

## 2018-09-21 NOTE — PLAN OF CARE
Problem: Goal Outcome Summary  Goal: Goal Outcome Summary  Outcome: No Change  Patient up in the chair during meals,denies pain and denies SOB.Blood sugar this morning was 62,declined re-check in 15 minutes because he had his tray.Blood sugar @ 1048 am was 159,no ss insulin given but re- checked @ 1259 pm blood sugar was down to 116.Patient independent with colostomy poach cares.PIV in right hand intact.Specimen from ulcer in his mouth collected and send.

## 2018-09-21 NOTE — PLAN OF CARE
Problem: Goal Outcome Summary  Goal: Goal Outcome Summary  Outcome: Therapy, progress toward functional goals as expected  PTA: Pt requesting OP cardiac rehab at [facility] in [facility]. Order obtained from NP, paperwork started. Techs have paperwork and will fax to [facility]. Pt is aware that [facility] will call him to set-up OP schedule. Pt also stated he is shopping around for a 4ww and reacher. He plans to purchase both online. Plan for last day of therapy tomorrow, with pt hoping to discharge home Sunday.

## 2018-09-21 NOTE — PLAN OF CARE
Problem: Goal Outcome Summary  Goal: Goal Outcome Summary  Patient alert and oriented.@0600 temp was 98.3. B/P 137/83, hydralazine given. Denies SOB or pain. Appeared to be sleeping during rounds. Continue with POC

## 2018-09-21 NOTE — PROGRESS NOTES
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      Elevated temperature; Elevated inflammatory markers; Progressive oral ulcer - On hospital admission noted to have necrotic ulcer felt to be the nidus of his recent pseudomonal bacteremia.  Per discharge summary, ulcer appeared to be improved.  Spiked fever 9/16 and 9/17 ~101 with elevation in inflammatory markers.  Fever curve improved (tmax 99), CRP 80->119->121->125->87.  Chest C/A/P (9/18) with new L>R lower lobe opacity but otherwise negative for source of fever,  Blood cx (9/16) NGTD, UA/UC negative.  Stable leukopenia.  No concerning symptom on exam.  Per cards, ulceration appears progressed.  ENT consulted 9/19 as concern for osteomyelitis.  CT max/face (9/19) no e/o sinusitis, discussed w/ rads who did not see any overt e/o osteomyelitis, however, per discussion with ENT MD today, possible small area of bony erosion.  Biopsy (9/19) w/ necrotic tissue with bacterial overgrowth. Wound cx (9/19) Klebsiella pneumoniae.  Per most recent discussion with transplant ID, no current recommendation for antibx, however, Dr. Keen to discuss case with ID directly.   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 Briefly CT on 8/12 showed inflamed loops of bowel (specifically in sigmoid colon) felt to be 2/2 sigmoid diverticulitis with associated abscess.  Also noted to have a daniella-rectal abscess s/p I&D 8/12/18.  Completed cipro/flagyl course 8/14-8/27 as biopsy + for Citrobacter freundii complex, VRE, Klebsiella pneumonia, pseudomonas aeruginosa and veillonella. Pt also tested positive for C.diff 8/13 and treated w/PO Vanco x 24 days (continued for 10 days after course for intraabdominal infection). Taken to OR on 8/14 with Dr. Tran for lap sigmoidectomy with end colostomy and partial SBR for jejunocolonic fistula.  CT enterography 8/30 negative for abscess.  Hospitalization c/b ileus requiring NGT decompression.  CT A/P for infectious w/u on 9/18 negative for abscess, did note gastritis vs duodenitis.  Discussed with Dr. Tran on 9/19 who felt most likely mucositis.  No current abd pain.  Discussed with GI on 9/19 who recommended OP EGD.   - Continue BID dosing of PPI (increased on 9/20)  - GI consult on discharge for possible EGD   - WOCN consult for ostomy care   - contact precautions for VRE  - Has OP follow up with CRS (Dr. Tran) on 10/15  - Diet: regular     Neutropenic Leukopenia - Per transplant ID, likely 2/2 immunosuppression (tacro and prednisone).  WBC currently improved initially this week, currently 2.2 with ANC 1.4.  Recent up trend may be 2/2 inflammation or infection as described above.   - CBC q MF   - Per cardiology discharge summary, if ANC <or = 1.0, may given one more  more G-CSF doses until ANC count is >1.    - No need for neutropenic precautions currently  - Will need to send [employer] immune cell fxn once WBC and ANC normalized ([id number])     [facility] s/p heart transplant (6/14/18), RIJ thrombus -  H/o systolic HF 2/2 NICM, CKD, HTN, HLD and DM2 s/p OHT 6/14/18.  Post op course c/b leukocytosis, RIJ thrombus (non-occlusive) which, on most recent US (9/11) was interpreted to be more chronic versus worsening.  Otherwise, doing clinically well w/o evidence of graft dysfunction.  Most recent TTE (7/20) showed glabal and regional L ventricular function that was hyperkinetic with LVEF of >70%, mild-moderate LVH and RV with normal size and systolic fxn w/ mild hypertrophy.  Negative biopsy 8/24 and 9/5. Cellcept discontiued since 9/8/18 2/2 leukopenia, Valcyte discontinued 9/5.  Per ID, immunosuppression with Prednisone 5 mg BID and Tacrolimus 2 gm BID.  Tacro goal 6-8 (due to infection) with most recent Tacro level 7.9 today.  CMV 9/18 not detected.   - Continue Tacro dose 2 mg BID and recheck level on Wednesday, 9/19 to ensure therapeutic.    - Per Dr. Ernst, continue tacro and prednisone only, he will give further reccs regarding immunosuppression based on next biopsy results   - CMV q Monday  - Next right heart cath and biopsy due in 2 weeks ~ 10/8.  - Per Dr. Ernst, pt is to remain off of Eliquis 2/2 to recent significant bleed  - Order for cardiac rehab placed     HTN - Managed on hydralazine, amlodipine and lisinopril outpatient. Lisinopril initially held when pt's Hgb dropped, but restarted later at 2.5mg daily (has since been increased to 5 mg). BPs currently 120s-130s/70s-80s.  - Continue PTA medications w/ parameters to hold  - Continue to monitor BPs closely      ESRD on HD - 2/2 HTN and DM2. Pt not currently on transplant list as of 8/29 due to patient being to ill. Per nephrology, needs rehab and better nutritional status to be active.  CT A/P on 9/18 with some  anasarca, will defer increase in fluid removal to nephrology.   - Continue HD T, Th,Sa  - F/u with Nephrology outpatient (sees Dr. Mcpherson)  - Monitor I/Os  - Daily weights  - Monitor electrolytes via BMP qMF      DM2 - Most recent Hgb A1c of 7.0% on 7/18 and was stable from 4/18. PTA on NPH 30 units qam, 16 units qpm. While in hospital, managed on lantus 16 units qam, and Novolog sliding scale. On TCU admission Lantus discontinued and started on PTA regimen, however, at half doses. Current glucoses low in AM but high in afternoon  Lab 09/21/18  1259 09/21/18  1048 09/21/18  0815 09/21/18  0528 09/20/18  2119 09/20/18  1823 09/20/18  0905  09/17/18  0925   GLC  --   --   --  75  --   --   --   --  124*   * 159* 62*  --  148* 227* 137*  < >  --    - Increase Humilin N to 12 units in AM and maintain evening dose of 8 units in PM  - Continue sliding scale but will attempt to not discharge him on this as he does not use this PTA   - Glucose checks QID   - Hypoglycemia protocol   - CArb controlled diet     Acute on Chronic Anemia - Likely acute exacerbation in setting of colitis. While inpt, pt did receive 3U PRBCs for decreased Hgb 8/21-8/23 with lowest Hgb of 6.9. BL appears to be ~8.5-11.0. Most recent Hgb 8.3 (9.7)).   Peripheral smear with no acute concerns 8/31.   - Continue Epo per nephrology  - Monitor via CBC w/ diff daily for now, then can return to twice weekly checks       Non-Severe Protein Calorie Malnutrition -  Albumin 2.0 9/10/18 with noted improvement in oral intake.   - Nutrition consulted and appreciate recommendations  - Continue carb controlled diet       New small pulmonary nodules - CT abdomen/pelvis 8/12 showed new small (3mm and 5mm) pulmonary nodules and found to be increased in size to 6mm on 8/29 CT entergraphy.  Repeat CT chest on 9/18 with pum nodules (several unchanged from prior) but at least one that is new.   - Will discuss findings with heart transplant team        Resolved/Ongoing Problems:  GERD. Continue PTA protonix.  HLD. Most recent lipid panel 7/18 with TC of 175, HDL of 92, LDL of 66. Continue with PTA atorvastatin.  Hx of P.auerginosa bacteremia 2/2 necrotic oral ulcer.  History of positive blood cultures x 2 from 7/26 with retained dialysis catheter for Pseudomonas aeruginosa. NG from repeat cultures 7/26, 7/28, 8/10 and 8/12. Per ID, long course of antibiotics as above active against Pseudomonas (cefepime through 8/23, then ciprofloxacin 8/27), but there is still a risk of reoccurrence. Oral ulcer was noted to be improved on discharge from [facility].  - If no clear infectious source identified, may need to have oral ulcer looked at more closely     Diet and/or tube feedings: Carbohydrate Controlled Diet   Lines, tubes, drains: Hemodialysis and Peripheral IV  DVT/GI prophylaxis: Pneumatic Compression Devices, on daily ASA 81 mg   Indications for psychotropic medications: No diagnosis  Code status: Full Code  Pneumococcal Vaccination Status: Received Prevnar -13 2015 and Pneumococcal 23 2001 and 2005.  Up to date.          Consults:   PT/OT/WOCN/Nutrition  ID following peripherally at TCU   Cardiology following at TCU         Discharge Planning:   PT scheduled through 9/22.  TCU course dependent on resolution of fevers/ need for antibiotics. .         Interval History:     No new complaints today.  Feels OK.  Denies fever, chills, nausea, vomiting, diarrhea, abdominal pain, chest pain or shortness of breath, headaches.           Physical Exam:   Blood pressure 123/72, pulse 90, temperature 98.8  F (37.1  C), temperature source Oral, resp. rate 20, weight 76.3 kg (168 lb 4.8 oz), SpO2 100 %.  GENERAL: Awake. Sitting up in chair eating lunch.   HEENT: NC/AT. Anicteric sclera. Mucous membranes moist.  Ulcerated lesion upper palate just behind front teeth with necrotic adherent tissue.  No pain, purulence or erythema.   NECK: Supple   CV: RRR, S1S2. No appreciable  murmurs, rubs, or gallops.   RESPIRATORY: Normal respiratory effort on RA. Lungs CTAB without wheezes, rales or rhonchi.   GI: Soft, non-tender, and non-distended with bowel sounds present in all quadrants. Stoma viable with soft, brown stool in bag. Lap incisions well healed without drainage.   EXTREMITIES: No peripheral edema. Warm & well perfused.  NEUROLOGIC: Alert and orientated x 3. CN II-XII grossly intact. No focal deficits.   MUSCULOSKELETAL: No joint swelling or tenderness.   SKIN: No jaundice. No rashes or lesions. Right subclavian HD line CDI.      Labs & Studies:     ROUTINE IP LABS (Last four results)  CMP     Recent Labs  Lab 09/21/18  0528 09/17/18  0925 09/15/18  0603    138  --    POTASSIUM 3.7 3.6  --    CHLORIDE 102 101  --    CO2 29 25  --    ANIONGAP 8 12  --    GLC 75 124*  --    BUN 14 24  --    CR 2.96* 4.61*  --    TRINI 7.8* 8.4*  --    MAG  --   --  2.1     CBC     Recent Labs  Lab 09/20/18  0733 09/19/18  0909 09/18/18  0646 09/17/18  0925   WBC 2.6* 3.8* 3.7* 4.4   RBC 3.11* 2.98* 2.65* 3.00*   HGB 9.7* 9.5* 8.5* 9.6*   HCT 31.6* 30.3* 27.4* 31.1*   * 102* 103* 104*   MCH 31.2 31.9 32.1 32.0   MCHC 30.7* 31.4* 31.0* 30.9*   RDW 19.8* 19.9* 20.3* 20.8*    223 208 254     INR No lab results found in last 7 days.      Unresulted Labs Ordered in the Past 30 Days of this Admission     Date and Time Order Name Status Description    9/21/2018 0829 HSV 1 and 2 DNA by PCR In process     9/21/2018 0300 Tacrolimus level In process     9/16/2018 1419 Blood culture Preliminary     9/16/2018 1419 Blood culture Preliminary     8/14/2018 1143 Platelets prepare order unit In process         Results for orders placed or performed during the hospital encounter of 09/11/18   XR Chest 2 Views    Narrative    Exam: XR CHEST 2 VW, 9/17/2018 3:10 PM    Indication: Fever, eval for PNA or infectious process;     Comparison: 7/26/2018    Findings:   PA and lateral views of the chest.  Postsurgical changes of cardiac  transplantation, including median sternotomy wires. Tunneled right IJ  central venous catheter tip projects within the right atrium, stable.  Trachea is midline. Cardiomediastinal silhouette is unchanged. New  streaky left retrocardiac opacities. No pleural effusion or  pneumothorax. Visualized upper abdomen and osseous structures are  unremarkable.      Impression    Impression:   1. Streaky left retrocardiac opacities compatible with infection. No  pleural effusion.  2. Stable postoperative changes of cardiac transplantation.    I have personally reviewed the examination and initial interpretation  and I agree with the findings.    CLARICE VILLARREAL MD     *Note: Due to a large number of results and/or encounters for the requested time period, some results have not been displayed. A complete set of results can be found in Results Review.       Marianne Welsh, [facility]  Hospitalist Service  Pager [phone]

## 2018-09-21 NOTE — PLAN OF CARE
Problem: Goal Outcome Summary  Goal: Goal Outcome Summary  Pt returned from dialysis about 1700. Pt took AM hydralazine at ~1400 per pt report. Not given upon return, given at 2200. Denies pain. T 99.1. Pt having lesion on roof of mouth, see note from NP. Colostomy intact, pt independent in emptying. BS at . Continue with POC for pt.

## 2018-09-21 NOTE — PROGRESS NOTES
Clinical Nutrition Services Brief Note      Nutrition History:  - Pt reports he avoids 'fake sugar' and tries to eat a mod carb diet at home.   - Pt has many complaints about hospital food provided and would prefer house made food such as homemade apple pie (with less sugar) and 'real' ice cream. He occasionally orders food from [employer].     Nutrition Intervention:  - Boost Plus QID - chocolate      Henna Molina RD, LD  Unit Pager: [phone]

## 2018-09-21 NOTE — PROGRESS NOTES
Social Work Services Progress Note    Hospital Day: 11  Date of Initial Social Work Evaluation:  9/12/2018  Collaborated with:  PtColette with Accommodations.     Intervention:  Accommodation request     Assessment:   Writer received a call from the pt asking about getting another gas card so that his friend can continue to transport him to his appointments. Writer stated that this should not be a problem and reached out to our accommodations department. Writer was able to get the pt a $25.00 Holiday gas card which also should help the pt get home on the day of his discharge. Writer gave the pt the gift card and he was very appreciative.     Plan:    Anticipated Disposition:  Home with outpatient services.     Barriers to d/c plan:  Medical clearance    Follow Up:  SW will continue to follow and assist as needed.    BROOKLYN Cazares  [facility]   P: [phone]  Pgr: [phone]

## 2018-09-21 NOTE — PROGRESS NOTES
"  WOC Nurse Inpatient [facility]   WO Nurse Inpatient Adult     Follow Up Assessment   Assessment of new end Colostomy Stoma complication(s) none   Mucocutaneous junction;Not assessed- patient independently changed his pouch yesterday 9/20/18  Peristomal complication(s) Not assessed- patient denies any rashes  Pouch wear time:3-4 days,   Following today's visit:Patient /   is  able to demonstrate; ( per previus assessment)      1. How to empty their pouch? yes      2. How to change their pouch?  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 Patient is independent with changes.   Stoma appearance: not asessed  Stoma size; 1 3/8\" x 1 7/8\" with good protrusion   Peristomal skin: not assessed today  Stoma output :brown and pasty   Surgical Site: open to air, healing      Interventions:  Patient's chart evaluated.  Focus of today's visit: needs assessed for future teaching (had already performed pouch change with nurse). Patient denies questions or concerns at this time.  Preparation for discharge: Tasks previously completed by [facility])  Supplies ordered, Completed supply list, Registered for samples from , Prescriptions or note left on chart for MD to sign/complete, Discussed making a [facility] Nurse outpatient appointment upon discharge, Discussed when to follow up with a [facility] Nurse in the future and Discussed how/ where to order " supplies  Participant of teaching session today patient   Orders: \Reviewed  Change made with ostomy management today: No:   flat cut to fit Murray 1 piece with ostomy paste, barrier ring to fill in crease   Patient/family: performing independently   Supplies: at bedside (has own Hollisterpouch samples( using pouh that has automatic filter) and ostomy paste    Plan:  Patient independent with care. No needs today.    Discussed plan of care with Patient and Nurse  Nursing to notify the Provider(s) and re-consult the WOC Nurse if new ostomy concerns or discharge planned before next planned WOC visit.    WOC Nurse will return:  Monday

## 2018-09-21 NOTE — CONSULTS
[facility]   Cardiology II Service / Advanced Heart Failure  Daily Progress Note  Date of Service: 9/21/2018      Patient: Murray Nicholson  MRN: [id number]  Admission Date: 9/11/2018  Hospital Day # 10    Assessment and Plan: Murray Nicholson is a 63 year old male with a PMH of NICM s/p OHT 6/14/18, ESRD on HD, RIJ thrombus, and DM Type II. He underwent hospitalization for  hematochezia secondary to colonic abscess, rectal abscess, and colitis and is now s/p sigmoid colectomy with colostomy. He transferred to rehab for further therapy.      Fever likely secondary to oral ulceration. Tmax in the last 48 hours 101. Chest X-ray concerning for new infiltrates. CT Chest/Abd/Pelvis consistent with concern for pulmonary edema, ascites, pulmonary nodules, and gastritis/duodenitis. UA negative. No current symptoms consistent with infectious etiology. CMV quant negative, no prior colonic biopsy. No GI symptoms, defer CDIF. Case reviewed with CRS. No acute surgical concern for infectious etiology. Consider further follow up with GI for upper endoscopy outpatient.  - CRP trending down today.   - Blood cultures NTD. No cultures obtained from HD line.   - Appreciate ID input.   - ENT consult appreciated, oral ulceration debrided at bedside. CT concerning for exposed bone per ENT with questionable bony erosion. HSV culture NTD. Pathology consistent with Necrotic tissue with bacterial overgrowth, no viable oral mucosa tissue identified, and minute benign skeletal muscle.   - Cipro 400 mg IV q24h, transition to po early next week. No CDIF prophylaxis indicated per ID.   - Defer MRI given ERSD with inability to use gadolinium.     Oral Candidiasis.   - Change Nystatin to Clotrimazole troches for 3 days, then back to Nystatin.       S/p OHT. 6/14/18 secondary to NICM. Echo 7/20/18 with normal graft function. RHC 9/5/18 with mRA-4, mPA-20, mPCW-10, BECKA CO-10, and BECKA CI-5 with biopsy negative.   Serostatus: HSV1-, HSV1+, CMV  D+/R-, EBV D?/R+, VZV+.  Immunosuppression: Prednisone 5 mg po BID. Tac level 7.9 9/19. Goal-6-8. Repeat level pending.    Prophylaxis: Pentamidine 9/14. Valcyte discontinued, weekly CMV negative. Cellcept discontinued in setting of leukopenia.   - Continue Lipitor.   - ASA 81 mg po daily.   - Next RHC and biopsy due 10/8/18.  - OK to receive flu vaccine quadrivalent prior to discharge.       HTN. Lisinopril held 8/6/18 due to concern for anemia exacerbation.   - Hydralazine 100 mg po TID and Norvasc 10 mg po daily.   - BP stable on current regimen.       ESRD on HD. Remains inactive on renal transplant list.   - Appreciate Nephrology consult with HD every TTS.   - He is concerned about volume status, will await repeat RHC numbers 10/8/18.       Pericolonic abscess, rectal abscess, and Colitis. S/p laparoscopic sigmoid colectomy with end colostomy 8/14 and drain to rectal abscess 8/12. Biopsy positive for VRE. Completed Cipro and Flagyl course 8/14-8/27. CT enterography 8/30 negative for abscess with improvement in dilated bowel. ID and CRS signed off. Blood cultures negative. No acute intervention indicated per CRS regarding CT findings.   - Miralax daily prn with Low residue diet.   - Continue Protonix 40 mg po BID for recent gastritis and duodenitis per CT.       Right internal jugular thrombus. Last noted per US 8/12/18, nonocclusive. Repeat US 9/5/18 notes nonocclusive thrombus to the lower right internal jugular.    - Repeat US in 2 weeks.   ================================================================  Interval History/ROS: He notes improvement to oral ulceration following debridement per ENT. He denies fever, chills, chest pain, palpitations, cough, nausea, vomiting, diarrhea, hematochezia, and melena. He complains of LE edema. He notes decreased appetite, but is tolerating po. He continues to progress with therapy well.     Last 24 hr care team notes reviewed.   ROS:  4 point ROS including Respiratory,  CV, GI and , other than that noted in the HPI, is negative.     Medications: Reviewed in EPIC.     Physical Exam:   /72 (BP Location: Right arm)  Pulse 90  Temp 98.8  F (37.1  C) (Oral)  Resp 20  Wt 76.3 kg (168 lb 4.8 oz)  SpO2 100%  BMI 24.85 kg/m2  GENERAL: Appears alert and oriented times three.   HEENT: Eye symmetrical and free of discharge bilaterally. Mucous membranes moist and with flat 3 cm lesion to upper palate, which is improved s/p debridement.   NECK: Supple and without lymphadenopathy. JVD 8 cm.   CV: RRR, S1S2 present without murmur, rub, or gallop.   RESPIRATORY: Respirations regular, even, and unlabored. Lungs CTA throughout.   GI: Soft and non distended with normoactive bowel sounds present in all quadrants. No tenderness, rebound, guarding. No organomegaly.   EXTREMITIES: +2 bilateral peripheral edema. 2+ bilateral pedal pulses.   NEUROLOGIC: Alert and orientated x 3. CN II-XII grossly intact. No focal deficits.   MUSCULOSKELETAL: No joint swelling or tenderness.   SKIN: No jaundice. No rashes or lesions.     Data:  CMP  Recent Labs  Lab 09/21/18  0528 09/17/18  0925 09/15/18  0603    138  --    POTASSIUM 3.7 3.6  --    CHLORIDE 102 101  --    CO2 29 25  --    ANIONGAP 8 12  --    GLC 75 124*  --    BUN 14 24  --    CR 2.96* 4.61*  --    GFRESTIMATED 22* 13*  --    GFRESTBLACK 26* 16*  --    TRINI 7.8* 8.4*  --    MAG  --   --  2.1     CBC  Recent Labs  Lab 09/21/18  0528 09/20/18  0733 09/19/18  0909 09/18/18  0646   WBC 2.2* 2.6* 3.8* 3.7*   RBC 2.66* 3.11* 2.98* 2.65*   HGB 8.3* 9.7* 9.5* 8.5*   HCT 26.8* 31.6* 30.3* 27.4*   * 102* 102* 103*   MCH 31.2 31.2 31.9 32.1   MCHC 31.0* 30.7* 31.4* 31.0*   RDW 19.5* 19.8* 19.9* 20.3*    222 223 208     Jennifer [facility]  9/21/2018

## 2018-09-22 LAB
BACTERIA SPEC CULT: NO GROWTH
BACTERIA SPEC CULT: NO GROWTH
GLUCOSE BLDC GLUCOMTR-MCNC: 117 MG/DL (ref 70–99)
GLUCOSE BLDC GLUCOMTR-MCNC: 125 MG/DL (ref 70–99)
GLUCOSE BLDC GLUCOMTR-MCNC: 158 MG/DL (ref 70–99)
HSV1 DNA SPEC QL NAA+PROBE: NEGATIVE
HSV2 DNA SPEC QL NAA+PROBE: NEGATIVE
Lab: NORMAL
Lab: NORMAL
SPECIMEN SOURCE: NORMAL

## 2018-09-22 PROCEDURE — 25000128 H RX IP 250 OP 636: Performed by: NURSE PRACTITIONER

## 2018-09-22 PROCEDURE — 12000022 ZZH R&B SNF

## 2018-09-22 PROCEDURE — A9270 NON-COVERED ITEM OR SERVICE: HCPCS | Mod: GY | Performed by: INTERNAL MEDICINE

## 2018-09-22 PROCEDURE — 97110 THERAPEUTIC EXERCISES: CPT | Mod: GP | Performed by: PHYSICAL THERAPIST

## 2018-09-22 PROCEDURE — A9270 NON-COVERED ITEM OR SERVICE: HCPCS | Mod: GY | Performed by: NURSE PRACTITIONER

## 2018-09-22 PROCEDURE — 25000125 ZZHC RX 250: Performed by: INTERNAL MEDICINE

## 2018-09-22 PROCEDURE — 25000132 ZZH RX MED GY IP 250 OP 250 PS 637: Mod: GY | Performed by: PHYSICIAN ASSISTANT

## 2018-09-22 PROCEDURE — 40000193 ZZH STATISTIC PT WARD VISIT: Performed by: PHYSICAL THERAPIST

## 2018-09-22 PROCEDURE — 25000132 ZZH RX MED GY IP 250 OP 250 PS 637: Mod: GY | Performed by: NURSE PRACTITIONER

## 2018-09-22 PROCEDURE — 25000131 ZZH RX MED GY IP 250 OP 636 PS 637: Mod: GY | Performed by: INTERNAL MEDICINE

## 2018-09-22 PROCEDURE — 25000132 ZZH RX MED GY IP 250 OP 250 PS 637: Mod: GY | Performed by: INTERNAL MEDICINE

## 2018-09-22 PROCEDURE — 97530 THERAPEUTIC ACTIVITIES: CPT | Mod: GP | Performed by: PHYSICAL THERAPIST

## 2018-09-22 PROCEDURE — A9270 NON-COVERED ITEM OR SERVICE: HCPCS | Mod: GY | Performed by: PHYSICIAN ASSISTANT

## 2018-09-22 PROCEDURE — 00000146 ZZHCL STATISTIC GLUCOSE BY METER IP

## 2018-09-22 RX ADMIN — PREDNISONE 5 MG: 5 TABLET ORAL at 17:30

## 2018-09-22 RX ADMIN — TACROLIMUS 2 MG: 1 CAPSULE ORAL at 22:27

## 2018-09-22 RX ADMIN — CLOTRIMAZOLE 1 TROCHE: 10 LOZENGE ORAL at 18:24

## 2018-09-22 RX ADMIN — ASPIRIN 81 MG: 81 TABLET, COATED ORAL at 08:23

## 2018-09-22 RX ADMIN — TACROLIMUS 2 MG: 1 CAPSULE ORAL at 08:21

## 2018-09-22 RX ADMIN — PREDNISONE 5 MG: 5 TABLET ORAL at 08:23

## 2018-09-22 RX ADMIN — Medication 1 CAPSULE: at 08:23

## 2018-09-22 RX ADMIN — CHLORHEXIDINE GLUCONATE 0.12% ORAL RINSE 15 ML: 1.2 LIQUID ORAL at 22:35

## 2018-09-22 RX ADMIN — INSULIN HUMAN 12 UNITS: 100 INJECTION, SUSPENSION SUBCUTANEOUS at 08:24

## 2018-09-22 RX ADMIN — CHLORHEXIDINE GLUCONATE 0.12% ORAL RINSE 15 ML: 1.2 LIQUID ORAL at 08:20

## 2018-09-22 RX ADMIN — ATORVASTATIN CALCIUM 20 MG: 20 TABLET, FILM COATED ORAL at 22:27

## 2018-09-22 RX ADMIN — AMLODIPINE BESYLATE 10 MG: 5 TABLET ORAL at 08:23

## 2018-09-22 RX ADMIN — PANTOPRAZOLE SODIUM 40 MG: 40 TABLET, DELAYED RELEASE ORAL at 17:31

## 2018-09-22 RX ADMIN — MAGNESIUM OXIDE TAB 400 MG (241.3 MG ELEMENTAL MG) 400 MG: 400 (241.3 MG) TAB at 08:22

## 2018-09-22 RX ADMIN — CLOTRIMAZOLE 1 TROCHE: 10 LOZENGE ORAL at 08:24

## 2018-09-22 RX ADMIN — CIPROFLOXACIN 400 MG: 2 INJECTION, SOLUTION INTRAVENOUS at 18:27

## 2018-09-22 RX ADMIN — ONDANSETRON 4 MG: 4 TABLET, ORALLY DISINTEGRATING ORAL at 02:41

## 2018-09-22 RX ADMIN — PANTOPRAZOLE SODIUM 40 MG: 40 TABLET, DELAYED RELEASE ORAL at 08:23

## 2018-09-22 RX ADMIN — LISINOPRIL 5 MG: 2.5 TABLET ORAL at 08:21

## 2018-09-22 RX ADMIN — CLOTRIMAZOLE 1 TROCHE: 10 LOZENGE ORAL at 22:26

## 2018-09-22 RX ADMIN — ANALGESIC BALM: 1.74; 4.06 OINTMENT TOPICAL at 22:28

## 2018-09-22 NOTE — PLAN OF CARE
Problem: Goal Outcome Summary  Goal: Goal Outcome Summary  Patient alert and oriented. PRN Zofran given for nausea per patient request. No complaints of pain or SOB noted. Patient has dialysis today, to go with scheduled hydralizine.

## 2018-09-22 NOTE — PROGRESS NOTES
ENT progress note:    Date of service 9/21/18    Subjective:  Patient reports no fevers. He continues to note tissue in the mouth.    Objective:   Vitals:    09/21/18 1615 09/21/18 2151 09/22/18 0238 09/22/18 0744   BP: 117/72 140/80  153/88   BP Location: Right arm Right arm  Right arm   Pulse:       Resp: 20 18   Temp: 98.4  F (36.9  C)  98  F (36.7  C) 98.1  F (36.7  C)   TempSrc: Oral   Oral   SpO2: 97%   99%   Weight:         General: A+O  ENT: Necrotic oral ulcer unchanged. Necrosis extends to bony soft palate. Concern for bony erosion of hard palate adjacent to tooth #7 on clinical exam.  Patient is anesthetic in the area of the ulcer- even with palpation with probe into the incisive foramen.  Necrotic tissue pedicled on necrotic base --excised and debrided on rounds.    CT facial bones personally reviewed: There is bony erosion of the hard palate vs large incisive foramen in the area of clinical concern. See sagittal sections series 5 #46-47    Pathology reviewed: Necrotic tissue and skeletal muscle. No malignant features identified.  Culture reviewed: Klebsiella.    Assessment: necrotic oral ulcer and concern for underlying osetomyelitis    Plan:  Recommend antibiotics, will defer management to primary team and ID.  No role for further debridement at this time, poor surgical candidate.   Please call if we can be of further assistance.

## 2018-09-22 NOTE — PLAN OF CARE
Problem: Goal Outcome Summary  Goal: Goal Outcome Summary  RN: Pt alert and oriented, VSS. No new concerns or issues. IV abx infused through the PIV as ordered without issues. Appetite good. Incisions remained CDI and OBED. Pt denies pain or SOB and has no complaints, very pleasant. Continue with POC.

## 2018-09-22 NOTE — PLAN OF CARE
Problem: Goal Outcome Summary  Goal: Goal Outcome Summary  Outcome: No Change  Patient left for dialysis @ 0930 and he is not back yet.Colostomy poach was intact when he left,he takes care of the poach independently.PIV in right hand intact.Blood sugar checks before meals.

## 2018-09-22 NOTE — PROGRESS NOTES
Brief Progress Note    Chart reviewed, no overnight events.  Lengthy discussion with cardiology team yesterday afternoon and have received updates from cardiac surgeon, Dr. Keen and ID staff, Dr. Lawrence.  Givne low grade fevers and elevated inflammatory markers with oral ulcer growing heavy growth klebsiella pneumoniae, decision was made to treat with antibiotics.  Cardiology recommended starting IV Ciprofloxacin with plan to transition to oral Cipro sometime early next week. Microbiology reviewed this AM, Klebsiella is sensitive to Cipro.  Tmax overnight 98.4.  S/p debridement at bedside yesterday by ENT.  Tacrolimus level yesterday afternoon 9.9 (goal 6-8) but this was an 9 hour trough and not accurate. Also noted possible oral candidiasis yesterday despite being on Nystatin.   - Change Nystatin to Clotrimazole troches for 3 days then back to Nystatin   - Continue Cipro 400 mg IV q 24 hours unti early this coming week, de-escalate to PO per cards reccs   - No MRI given ESRD and need for JEAN PAUL   - Will resend Tacr trough on Monday, note it should be drawn at 9AM as patient prefers to take his Tacro at 9/9  - Trend fever curve   - Follow cx data   - Will trend glucoses today and be mindful that I increased his AM Humulin this AM for high afternoon glucoses    Marianne Welsh, [facility]  Hospitalist Service  Pager [phone]

## 2018-09-23 LAB
GLUCOSE BLDC GLUCOMTR-MCNC: 160 MG/DL (ref 70–99)
GLUCOSE BLDC GLUCOMTR-MCNC: 226 MG/DL (ref 70–99)
GLUCOSE BLDC GLUCOMTR-MCNC: 285 MG/DL (ref 70–99)

## 2018-09-23 PROCEDURE — 25000131 ZZH RX MED GY IP 250 OP 636 PS 637: Mod: GY | Performed by: INTERNAL MEDICINE

## 2018-09-23 PROCEDURE — 25000128 H RX IP 250 OP 636: Performed by: NURSE PRACTITIONER

## 2018-09-23 PROCEDURE — A9270 NON-COVERED ITEM OR SERVICE: HCPCS | Mod: GY | Performed by: NURSE PRACTITIONER

## 2018-09-23 PROCEDURE — 25000132 ZZH RX MED GY IP 250 OP 250 PS 637: Mod: GY | Performed by: INTERNAL MEDICINE

## 2018-09-23 PROCEDURE — A9270 NON-COVERED ITEM OR SERVICE: HCPCS | Mod: GY | Performed by: INTERNAL MEDICINE

## 2018-09-23 PROCEDURE — A9270 NON-COVERED ITEM OR SERVICE: HCPCS | Mod: GY | Performed by: PHYSICIAN ASSISTANT

## 2018-09-23 PROCEDURE — 12000022 ZZH R&B SNF

## 2018-09-23 PROCEDURE — 25000132 ZZH RX MED GY IP 250 OP 250 PS 637: Mod: GY | Performed by: NURSE PRACTITIONER

## 2018-09-23 PROCEDURE — 00000146 ZZHCL STATISTIC GLUCOSE BY METER IP

## 2018-09-23 PROCEDURE — 25000128 H RX IP 250 OP 636

## 2018-09-23 PROCEDURE — 25000125 ZZHC RX 250: Performed by: INTERNAL MEDICINE

## 2018-09-23 PROCEDURE — 25000132 ZZH RX MED GY IP 250 OP 250 PS 637: Mod: GY | Performed by: PHYSICIAN ASSISTANT

## 2018-09-23 RX ORDER — SODIUM CHLORIDE 9 MG/ML
INJECTION, SOLUTION INTRAVENOUS
Status: COMPLETED
Start: 2018-09-23 | End: 2018-09-23

## 2018-09-23 RX ADMIN — INSULIN HUMAN 12 UNITS: 100 INJECTION, SUSPENSION SUBCUTANEOUS at 08:41

## 2018-09-23 RX ADMIN — CHLORHEXIDINE GLUCONATE 0.12% ORAL RINSE 15 ML: 1.2 LIQUID ORAL at 21:30

## 2018-09-23 RX ADMIN — CHLORHEXIDINE GLUCONATE 0.12% ORAL RINSE 15 ML: 1.2 LIQUID ORAL at 08:36

## 2018-09-23 RX ADMIN — SODIUM CHLORIDE: 0.9 INJECTION, SOLUTION INTRAVENOUS at 18:14

## 2018-09-23 RX ADMIN — HYDRALAZINE HYDROCHLORIDE 100 MG: 50 TABLET ORAL at 06:25

## 2018-09-23 RX ADMIN — PREDNISONE 5 MG: 5 TABLET ORAL at 08:38

## 2018-09-23 RX ADMIN — MAGNESIUM OXIDE TAB 400 MG (241.3 MG ELEMENTAL MG) 400 MG: 400 (241.3 MG) TAB at 08:37

## 2018-09-23 RX ADMIN — TACROLIMUS 2 MG: 1 CAPSULE ORAL at 21:30

## 2018-09-23 RX ADMIN — Medication 1 CAPSULE: at 08:37

## 2018-09-23 RX ADMIN — CLOTRIMAZOLE 1 TROCHE: 10 LOZENGE ORAL at 21:30

## 2018-09-23 RX ADMIN — PREDNISONE 5 MG: 5 TABLET ORAL at 18:14

## 2018-09-23 RX ADMIN — CIPROFLOXACIN 400 MG: 2 INJECTION, SOLUTION INTRAVENOUS at 18:13

## 2018-09-23 RX ADMIN — ATORVASTATIN CALCIUM 20 MG: 20 TABLET, FILM COATED ORAL at 21:30

## 2018-09-23 RX ADMIN — HYDRALAZINE HYDROCHLORIDE 100 MG: 50 TABLET ORAL at 13:45

## 2018-09-23 RX ADMIN — CLOTRIMAZOLE 1 TROCHE: 10 LOZENGE ORAL at 08:38

## 2018-09-23 RX ADMIN — CLOTRIMAZOLE 1 TROCHE: 10 LOZENGE ORAL at 13:45

## 2018-09-23 RX ADMIN — ASPIRIN 81 MG: 81 TABLET, COATED ORAL at 08:37

## 2018-09-23 RX ADMIN — HYDRALAZINE HYDROCHLORIDE 100 MG: 50 TABLET ORAL at 21:30

## 2018-09-23 RX ADMIN — PANTOPRAZOLE SODIUM 40 MG: 40 TABLET, DELAYED RELEASE ORAL at 18:14

## 2018-09-23 RX ADMIN — ANALGESIC BALM: 1.74; 4.06 OINTMENT TOPICAL at 21:29

## 2018-09-23 RX ADMIN — CLOTRIMAZOLE 1 TROCHE: 10 LOZENGE ORAL at 18:14

## 2018-09-23 RX ADMIN — TACROLIMUS 2 MG: 1 CAPSULE ORAL at 08:37

## 2018-09-23 RX ADMIN — PANTOPRAZOLE SODIUM 40 MG: 40 TABLET, DELAYED RELEASE ORAL at 08:37

## 2018-09-23 RX ADMIN — INSULIN ASPART 1 UNITS: 100 INJECTION, SOLUTION INTRAVENOUS; SUBCUTANEOUS at 14:22

## 2018-09-23 RX ADMIN — INSULIN ASPART 3 UNITS: 100 INJECTION, SOLUTION INTRAVENOUS; SUBCUTANEOUS at 19:12

## 2018-09-23 NOTE — PLAN OF CARE
Pt Alert and Oriented X4. IND in his room. Afebrile. VSS. Lungs- Clear bilaterally with both anterior and posterior. Colostomy poach intact. PP+ DP+. CMS and Neuro's are intact to baseline. Denies numbness and tingling in all extremities. Denies pain. Denies nausea, shortness of breath, and chest pain. Pt denies dizziness or lightheadedness. Drinking well and tolerating regular diet. Fair appetite durig this shift. PIV is intact with intermittent antibiotic infusing via PIV. Bilateral heels are elevated off the bed. Pt is able to make needs known and the call light is within the pt's reach. Continue to monitor.

## 2018-09-23 NOTE — PLAN OF CARE
Problem: Goal Outcome Summary  Goal: Goal Outcome Summary  Outcome: No Change  Patient up in the chair during meals,blood sugar check @ 1355 was 160,one U insulin given.Denies pain and no SOB noted.Morning blood pressure meds held.Colostomy poach intact,patient empties it.PIV in right hand intact.Patient has been cooperative with cares.

## 2018-09-23 NOTE — PLAN OF CARE
"Problem: Goal Outcome Summary  Goal: Goal Outcome Summary  Patient slept well. No c/o pain. He is modified independent in the room, his walker is within his reach. He did not call for assistance, he was seen at the beginning of the shift during bedside report, he requested not to be disturbed, he was round on every 2 hours, he was sleeping each time. Will see him again this AM for medication.  Patient was nauseated when he was awakened for AM meds, he asked for his emesis bag but he had no emesis, only phlegm. He got up to use the bathroom declined to use a walker , he was upset because the walker was within his reach: \" Who told you to bring it here\", in a loud voice. The orders are for him to be modified independent in the room using a walker, no walker when he is ambulating in a hallway, only supervision.       "

## 2018-09-24 LAB
ANION GAP SERPL CALCULATED.3IONS-SCNC: 10 MMOL/L (ref 3–14)
BUN SERPL-MCNC: 21 MG/DL (ref 7–30)
CALCIUM SERPL-MCNC: 8.4 MG/DL (ref 8.5–10.1)
CHLORIDE SERPL-SCNC: 99 MMOL/L (ref 94–109)
CO2 SERPL-SCNC: 28 MMOL/L (ref 20–32)
CREAT SERPL-MCNC: 4.41 MG/DL (ref 0.66–1.25)
ERYTHROCYTE [DISTWIDTH] IN BLOOD BY AUTOMATED COUNT: 19.4 % (ref 10–15)
GFR SERPL CREATININE-BSD FRML MDRD: 14 ML/MIN/1.7M2
GLUCOSE BLDC GLUCOMTR-MCNC: 123 MG/DL (ref 70–99)
GLUCOSE BLDC GLUCOMTR-MCNC: 216 MG/DL (ref 70–99)
GLUCOSE BLDC GLUCOMTR-MCNC: 261 MG/DL (ref 70–99)
GLUCOSE BLDC GLUCOMTR-MCNC: 314 MG/DL (ref 70–99)
GLUCOSE SERPL-MCNC: 134 MG/DL (ref 70–99)
HCT VFR BLD AUTO: 32.8 % (ref 40–53)
HGB BLD-MCNC: 10.2 G/DL (ref 13.3–17.7)
MCH RBC QN AUTO: 30.9 PG (ref 26.5–33)
MCHC RBC AUTO-ENTMCNC: 31.1 G/DL (ref 31.5–36.5)
MCV RBC AUTO: 99 FL (ref 78–100)
PLATELET # BLD AUTO: 329 10E9/L (ref 150–450)
POTASSIUM SERPL-SCNC: 3.6 MMOL/L (ref 3.4–5.3)
RBC # BLD AUTO: 3.3 10E12/L (ref 4.4–5.9)
SODIUM SERPL-SCNC: 137 MMOL/L (ref 133–144)
TACROLIMUS BLD-MCNC: 23 UG/L (ref 5–15)
TME LAST DOSE: ABNORMAL H
WBC # BLD AUTO: 2.2 10E9/L (ref 4–11)

## 2018-09-24 PROCEDURE — 25000128 H RX IP 250 OP 636: Performed by: NURSE PRACTITIONER

## 2018-09-24 PROCEDURE — 25000132 ZZH RX MED GY IP 250 OP 250 PS 637: Mod: GY | Performed by: INTERNAL MEDICINE

## 2018-09-24 PROCEDURE — 25000132 ZZH RX MED GY IP 250 OP 250 PS 637: Mod: GY | Performed by: NURSE PRACTITIONER

## 2018-09-24 PROCEDURE — A9270 NON-COVERED ITEM OR SERVICE: HCPCS | Mod: GY | Performed by: NURSE PRACTITIONER

## 2018-09-24 PROCEDURE — 40000193 ZZH STATISTIC PT WARD VISIT: Performed by: PHYSICAL THERAPIST

## 2018-09-24 PROCEDURE — 80197 ASSAY OF TACROLIMUS: CPT | Performed by: PHYSICIAN ASSISTANT

## 2018-09-24 PROCEDURE — 97530 THERAPEUTIC ACTIVITIES: CPT | Mod: GP | Performed by: PHYSICAL THERAPIST

## 2018-09-24 PROCEDURE — 25000125 ZZHC RX 250: Performed by: INTERNAL MEDICINE

## 2018-09-24 PROCEDURE — 97116 GAIT TRAINING THERAPY: CPT | Mod: GP | Performed by: PHYSICAL THERAPIST

## 2018-09-24 PROCEDURE — A9270 NON-COVERED ITEM OR SERVICE: HCPCS | Mod: GY | Performed by: INTERNAL MEDICINE

## 2018-09-24 PROCEDURE — 36415 COLL VENOUS BLD VENIPUNCTURE: CPT | Performed by: NURSE PRACTITIONER

## 2018-09-24 PROCEDURE — 00000146 ZZHCL STATISTIC GLUCOSE BY METER IP

## 2018-09-24 PROCEDURE — 85027 COMPLETE CBC AUTOMATED: CPT | Performed by: NURSE PRACTITIONER

## 2018-09-24 PROCEDURE — 25000131 ZZH RX MED GY IP 250 OP 636 PS 637: Mod: GY | Performed by: INTERNAL MEDICINE

## 2018-09-24 PROCEDURE — 12000022 ZZH R&B SNF

## 2018-09-24 PROCEDURE — 00220000 ZZH SNF RUG CODE OPNP

## 2018-09-24 PROCEDURE — 80048 BASIC METABOLIC PNL TOTAL CA: CPT | Performed by: PHYSICIAN ASSISTANT

## 2018-09-24 PROCEDURE — 25000131 ZZH RX MED GY IP 250 OP 636 PS 637: Mod: GY | Performed by: NURSE PRACTITIONER

## 2018-09-24 PROCEDURE — 25000132 ZZH RX MED GY IP 250 OP 250 PS 637: Mod: GY | Performed by: PHYSICIAN ASSISTANT

## 2018-09-24 PROCEDURE — A9270 NON-COVERED ITEM OR SERVICE: HCPCS | Mod: GY | Performed by: PHYSICIAN ASSISTANT

## 2018-09-24 RX ADMIN — CHLORHEXIDINE GLUCONATE 0.12% ORAL RINSE 15 ML: 1.2 LIQUID ORAL at 21:31

## 2018-09-24 RX ADMIN — PREDNISONE 5 MG: 5 TABLET ORAL at 08:09

## 2018-09-24 RX ADMIN — LISINOPRIL 5 MG: 2.5 TABLET ORAL at 08:09

## 2018-09-24 RX ADMIN — ASPIRIN 81 MG: 81 TABLET, COATED ORAL at 08:10

## 2018-09-24 RX ADMIN — ANALGESIC BALM: 1.74; 4.06 OINTMENT TOPICAL at 14:21

## 2018-09-24 RX ADMIN — INSULIN ASPART 3 UNITS: 100 INJECTION, SOLUTION INTRAVENOUS; SUBCUTANEOUS at 18:15

## 2018-09-24 RX ADMIN — CIPROFLOXACIN 400 MG: 2 INJECTION, SOLUTION INTRAVENOUS at 18:10

## 2018-09-24 RX ADMIN — Medication 1 CAPSULE: at 08:09

## 2018-09-24 RX ADMIN — HYDRALAZINE HYDROCHLORIDE 100 MG: 50 TABLET ORAL at 06:34

## 2018-09-24 RX ADMIN — TACROLIMUS 2 MG: 1 CAPSULE ORAL at 09:18

## 2018-09-24 RX ADMIN — CLOTRIMAZOLE 1 TROCHE: 10 LOZENGE ORAL at 13:17

## 2018-09-24 RX ADMIN — CLOTRIMAZOLE 1 TROCHE: 10 LOZENGE ORAL at 21:29

## 2018-09-24 RX ADMIN — HYDRALAZINE HYDROCHLORIDE 100 MG: 50 TABLET ORAL at 21:29

## 2018-09-24 RX ADMIN — HYDRALAZINE HYDROCHLORIDE 100 MG: 50 TABLET ORAL at 14:20

## 2018-09-24 RX ADMIN — INSULIN HUMAN 12 UNITS: 100 INJECTION, SUSPENSION SUBCUTANEOUS at 08:04

## 2018-09-24 RX ADMIN — PREDNISONE 5 MG: 5 TABLET ORAL at 17:52

## 2018-09-24 RX ADMIN — CLOTRIMAZOLE 1 TROCHE: 10 LOZENGE ORAL at 17:51

## 2018-09-24 RX ADMIN — PANTOPRAZOLE SODIUM 40 MG: 40 TABLET, DELAYED RELEASE ORAL at 17:51

## 2018-09-24 RX ADMIN — CLOTRIMAZOLE 1 TROCHE: 10 LOZENGE ORAL at 08:10

## 2018-09-24 RX ADMIN — INSULIN ASPART 2 UNITS: 100 INJECTION, SOLUTION INTRAVENOUS; SUBCUTANEOUS at 13:09

## 2018-09-24 RX ADMIN — MAGNESIUM OXIDE TAB 400 MG (241.3 MG ELEMENTAL MG) 400 MG: 400 (241.3 MG) TAB at 08:10

## 2018-09-24 RX ADMIN — ANALGESIC BALM: 1.74; 4.06 OINTMENT TOPICAL at 08:11

## 2018-09-24 RX ADMIN — ANALGESIC BALM: 1.74; 4.06 OINTMENT TOPICAL at 21:38

## 2018-09-24 RX ADMIN — ATORVASTATIN CALCIUM 20 MG: 20 TABLET, FILM COATED ORAL at 21:29

## 2018-09-24 RX ADMIN — CHLORHEXIDINE GLUCONATE 0.12% ORAL RINSE 15 ML: 1.2 LIQUID ORAL at 08:09

## 2018-09-24 RX ADMIN — PANTOPRAZOLE SODIUM 40 MG: 40 TABLET, DELAYED RELEASE ORAL at 08:11

## 2018-09-24 NOTE — PLAN OF CARE
Problem: Goal Outcome Summary  Goal: Goal Outcome Summary  Outcome: Improving  Patient is alert x 4 and he directs his needs. VSS. Weight done.  Patient denies any pain. Patient independent with his colostomy cares. Patient is up in his room using his walker. No SOB.

## 2018-09-24 NOTE — PLAN OF CARE
Problem: Goal Outcome Summary  Goal: Goal Outcome Summary  Patient slept well. He is modified independent in the room but declined to use a walker. He used the bathroom twice tonight. No c/o pain. He has a colostomy and he is independent with its cares.

## 2018-09-24 NOTE — PLAN OF CARE
Problem: TCU Patient Goals  Goal: TCU Plan of Care  The patient is alert and oriented x 3. VSS. Able to make needs known. Appetitie is good and denies pain.Independent with ambulation and involves in his care. Colostomy is intact. Continue to monitor for comfort.

## 2018-09-25 ENCOUNTER — RECORDS - HEALTHEAST (OUTPATIENT)
Dept: LAB | Facility: CLINIC | Age: 63
End: 2018-09-25

## 2018-09-25 LAB
ANION GAP SERPL CALCULATED.3IONS-SCNC: 7 MMOL/L (ref 3–14)
BASOPHILS # BLD AUTO: 0 10E9/L (ref 0–0.2)
BASOPHILS NFR BLD AUTO: 0.3 %
BUN SERPL-MCNC: 8 MG/DL (ref 7–30)
CALCIUM SERPL-MCNC: 8.2 MG/DL (ref 8.5–10.1)
CHLORIDE SERPL-SCNC: 102 MMOL/L (ref 94–109)
CMV DNA SPEC NAA+PROBE-ACNC: NORMAL [IU]/ML
CMV DNA SPEC NAA+PROBE-LOG#: NORMAL {LOG_IU}/ML
CO2 SERPL-SCNC: 29 MMOL/L (ref 20–32)
CREAT SERPL-MCNC: 2.12 MG/DL (ref 0.66–1.25)
CRP SERPL-MCNC: 35.9 MG/L (ref 0–8)
DIFFERENTIAL METHOD BLD: ABNORMAL
EOSINOPHIL # BLD AUTO: 0 10E9/L (ref 0–0.7)
EOSINOPHIL NFR BLD AUTO: 0.5 %
ERYTHROCYTE [DISTWIDTH] IN BLOOD BY AUTOMATED COUNT: 19.3 % (ref 10–15)
GFR SERPL CREATININE-BSD FRML MDRD: 32 ML/MIN/1.7M2
GLUCOSE BLDC GLUCOMTR-MCNC: 169 MG/DL (ref 70–99)
GLUCOSE BLDC GLUCOMTR-MCNC: 184 MG/DL (ref 70–99)
GLUCOSE BLDC GLUCOMTR-MCNC: 236 MG/DL (ref 70–99)
GLUCOSE SERPL-MCNC: 152 MG/DL (ref 70–99)
HCT VFR BLD AUTO: 30.5 % (ref 40–53)
HGB BLD-MCNC: 9.6 G/DL (ref 13.3–17.7)
IMM GRANULOCYTES # BLD: 0.1 10E9/L (ref 0–0.4)
IMM GRANULOCYTES NFR BLD: 1.8 %
LYMPHOCYTES # BLD AUTO: 0.7 10E9/L (ref 0.8–5.3)
LYMPHOCYTES NFR BLD AUTO: 18.9 %
MCH RBC QN AUTO: 30.7 PG (ref 26.5–33)
MCHC RBC AUTO-ENTMCNC: 31.5 G/DL (ref 31.5–36.5)
MCV RBC AUTO: 97 FL (ref 78–100)
MONOCYTES # BLD AUTO: 0.6 10E9/L (ref 0–1.3)
MONOCYTES NFR BLD AUTO: 15 %
NEUTROPHILS # BLD AUTO: 2.4 10E9/L (ref 1.6–8.3)
NEUTROPHILS NFR BLD AUTO: 63.5 %
NRBC # BLD AUTO: 0 10*3/UL
NRBC BLD AUTO-RTO: 0 /100
PLATELET # BLD AUTO: 302 10E9/L (ref 150–450)
POTASSIUM BLD-SCNC: 3.7 MMOL/L (ref 3.5–5)
POTASSIUM SERPL-SCNC: 3.3 MMOL/L (ref 3.4–5.3)
RBC # BLD AUTO: 3.13 10E12/L (ref 4.4–5.9)
SODIUM SERPL-SCNC: 138 MMOL/L (ref 133–144)
SPECIMEN SOURCE: NORMAL
WBC # BLD AUTO: 3.8 10E9/L (ref 4–11)

## 2018-09-25 PROCEDURE — 99310 SBSQ NF CARE HIGH MDM 45: CPT | Performed by: PHYSICIAN ASSISTANT

## 2018-09-25 PROCEDURE — 12000022 ZZH R&B SNF

## 2018-09-25 PROCEDURE — 80048 BASIC METABOLIC PNL TOTAL CA: CPT | Performed by: NURSE PRACTITIONER

## 2018-09-25 PROCEDURE — 25000132 ZZH RX MED GY IP 250 OP 250 PS 637: Mod: GY | Performed by: PHYSICIAN ASSISTANT

## 2018-09-25 PROCEDURE — 00000146 ZZHCL STATISTIC GLUCOSE BY METER IP

## 2018-09-25 PROCEDURE — 97530 THERAPEUTIC ACTIVITIES: CPT | Mod: GP | Performed by: PHYSICAL THERAPIST

## 2018-09-25 PROCEDURE — 25000132 ZZH RX MED GY IP 250 OP 250 PS 637: Mod: GY | Performed by: INTERNAL MEDICINE

## 2018-09-25 PROCEDURE — A9270 NON-COVERED ITEM OR SERVICE: HCPCS | Mod: GY | Performed by: NURSE PRACTITIONER

## 2018-09-25 PROCEDURE — 25000132 ZZH RX MED GY IP 250 OP 250 PS 637: Mod: GY | Performed by: NURSE PRACTITIONER

## 2018-09-25 PROCEDURE — A9270 NON-COVERED ITEM OR SERVICE: HCPCS | Mod: GY | Performed by: INTERNAL MEDICINE

## 2018-09-25 PROCEDURE — 85025 COMPLETE CBC W/AUTO DIFF WBC: CPT | Performed by: NURSE PRACTITIONER

## 2018-09-25 PROCEDURE — 40000193 ZZH STATISTIC PT WARD VISIT: Performed by: PHYSICAL THERAPIST

## 2018-09-25 PROCEDURE — 25000131 ZZH RX MED GY IP 250 OP 636 PS 637: Mod: GY | Performed by: INTERNAL MEDICINE

## 2018-09-25 PROCEDURE — A9270 NON-COVERED ITEM OR SERVICE: HCPCS | Mod: GY | Performed by: PHYSICIAN ASSISTANT

## 2018-09-25 PROCEDURE — 86140 C-REACTIVE PROTEIN: CPT | Performed by: NURSE PRACTITIONER

## 2018-09-25 PROCEDURE — 25000125 ZZHC RX 250: Performed by: INTERNAL MEDICINE

## 2018-09-25 RX ORDER — CIPROFLOXACIN 500 MG/1
500 TABLET, FILM COATED ORAL
Status: DISCONTINUED | OUTPATIENT
Start: 2018-09-25 | End: 2018-09-26 | Stop reason: HOSPADM

## 2018-09-25 RX ADMIN — INSULIN ASPART 1 UNITS: 100 INJECTION, SOLUTION INTRAVENOUS; SUBCUTANEOUS at 08:10

## 2018-09-25 RX ADMIN — NYSTATIN 1000000 UNITS: 100000 SUSPENSION ORAL at 21:48

## 2018-09-25 RX ADMIN — NYSTATIN 1000000 UNITS: 100000 SUSPENSION ORAL at 08:09

## 2018-09-25 RX ADMIN — PREDNISONE 5 MG: 5 TABLET ORAL at 08:09

## 2018-09-25 RX ADMIN — PANTOPRAZOLE SODIUM 40 MG: 40 TABLET, DELAYED RELEASE ORAL at 08:10

## 2018-09-25 RX ADMIN — AMLODIPINE BESYLATE 10 MG: 5 TABLET ORAL at 08:20

## 2018-09-25 RX ADMIN — INSULIN ASPART 1 UNITS: 100 INJECTION, SOLUTION INTRAVENOUS; SUBCUTANEOUS at 19:53

## 2018-09-25 RX ADMIN — HYDRALAZINE HYDROCHLORIDE 100 MG: 50 TABLET ORAL at 21:48

## 2018-09-25 RX ADMIN — HYDRALAZINE HYDROCHLORIDE 100 MG: 50 TABLET ORAL at 08:21

## 2018-09-25 RX ADMIN — LISINOPRIL 5 MG: 2.5 TABLET ORAL at 08:20

## 2018-09-25 RX ADMIN — CHLORHEXIDINE GLUCONATE 0.12% ORAL RINSE 15 ML: 1.2 LIQUID ORAL at 08:09

## 2018-09-25 RX ADMIN — HYDRALAZINE HYDROCHLORIDE 100 MG: 50 TABLET ORAL at 15:19

## 2018-09-25 RX ADMIN — ATORVASTATIN CALCIUM 20 MG: 20 TABLET, FILM COATED ORAL at 21:46

## 2018-09-25 RX ADMIN — CHLORHEXIDINE GLUCONATE 0.12% ORAL RINSE 15 ML: 1.2 LIQUID ORAL at 21:47

## 2018-09-25 RX ADMIN — NYSTATIN 1000000 UNITS: 100000 SUSPENSION ORAL at 15:20

## 2018-09-25 RX ADMIN — PREDNISONE 5 MG: 5 TABLET ORAL at 19:05

## 2018-09-25 RX ADMIN — MAGNESIUM OXIDE TAB 400 MG (241.3 MG ELEMENTAL MG) 400 MG: 400 (241.3 MG) TAB at 08:10

## 2018-09-25 RX ADMIN — INSULIN HUMAN 12 UNITS: 100 INJECTION, SUSPENSION SUBCUTANEOUS at 08:11

## 2018-09-25 RX ADMIN — Medication 1 CAPSULE: at 08:10

## 2018-09-25 RX ADMIN — CIPROFLOXACIN HYDROCHLORIDE 500 MG: 500 TABLET, FILM COATED ORAL at 16:42

## 2018-09-25 RX ADMIN — TACROLIMUS 2 MG: 1 CAPSULE ORAL at 08:09

## 2018-09-25 RX ADMIN — ASPIRIN 81 MG: 81 TABLET, COATED ORAL at 08:10

## 2018-09-25 RX ADMIN — NYSTATIN 1000000 UNITS: 100000 SUSPENSION ORAL at 18:00

## 2018-09-25 RX ADMIN — PANTOPRAZOLE SODIUM 40 MG: 40 TABLET, DELAYED RELEASE ORAL at 16:43

## 2018-09-25 NOTE — PROGRESS NOTES
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 Admitted to TCU on 9/11/18 for rehabilitation.     1. Infected necrotic oral ulcer d/t Klebsiella:  Spiked fever up to 101 on 9/16 with associated rise in CRP up to 125 (see trend below). UA negative. Blood cultures NGTD. CT chest/abd/pelvis (9/18) with new L>R lower lobe opacities but otherwise negative for source of fever. No clinical symptoms of pneumonia. Recent Pseudomonas bacteremia 7/2018 with source felt to be chronic necrotic oral ulcer. ENT consulted 9/19. CT maxillofacial negative for osteo, but per ENT possible small area of erosion. Biopsy 9/19 w necrotic tissue and bacterial overgrowth. Wound culture grew Klebsiella pneumoniae. Case discussed with transplant ID. Started on nystatin and clotrimazole for possible thrush. Afebrile. CRP improving. ? Antifungals contributing to supratherapeutic tacro.   - Cards II consult discussed case with Dr. Lawrence today. Duration of antibiotics 10-14 days pending clinical improvement.   - Cards II to evaluate ulcer 9/26, as patient is new to writer and no baseline to compare to  - Transition to Cipro 500mg dailiy (renal dosing)   - Last seen by ENT 9/21. No role for further debridement at this time. Poor surgical candidate.     2. S/p I&D of daniella-rectal/colonic abscesses (8/12) and laparoscopic sigmoidectomy with end colostomy, small bowel resection, and takedown of enterocolonic fistula (8/14):  CT 8/12 showed inflammatory changes of sigmoid colon c/w  diverticulitis with daniella-colonic and daniella-rectal abscesses. Completed 2 weeks of cipro/flagyl ending 8/27. Positive C.diff PCR 8/13. Completed 24 day course vancomycin 9/6 (10 days after completion of cipro/flagyl). CT enterography 8/30 negative for abscess. Post-op course c/b ileus requiring NG decompression. Repeat CT 9/18 due to fever negative for acute findings, although noted to have evidence of gastritis/duodenitis.   - Case discussed with GI. Recommend OP referral for EGD.  - Cont twice daily PPI  - WOCN consulted for ostomy cares  - Follow up with Dr. Tran of CRS 10/15  - Cont regular diet    3. Leukopenia with neutropenia:  Felt to be secondary to linezolid per transplant ID. WBC 3.8 today with ANC 2.4.   - Repeat CBC q M/F    4. NICM s/p OHT (6/14/18):  No evidence of graft dysfunction. Most recent TTE (7/20) showed hyperkinetic LV function with EF >70%, mild-moderate LVH, normal RV function with mild RV hypertrophy. Negative biopsy 8/24 and 9/5. Cellcept discontinued 9/8 d/t leukopenia. Currently on Tacrolimus and Prednisone. Tacro goal 6-8. Tacro level 23 today, likely d/t addition of cipro and clotrimazole. No other confounding factors. No evidence of toxicity.   - Tacro on hold per Cards II recs  - Repeat tacro 8/26  - Needs weekly CMV (ordered q Monday)  - Next R heart cath due around 10/8    5. Hx RIJ thrombus:  Noted post-op. Most recent US 9/11 showed evidence of chronic thrombosis. Per cardiology, continue to hold anticoagulation d/t recent bleeding (previously on Eliquis).     6. ESRD on HD:  Transplant listed 8/29, although needs improvement in nutrition and functional status to be active. No acute concerns.  - Cont HD q TTS  - Follow up with Dr. Mcpherson as directed  - Monitor I/O's and daily weights  - BMP q M/F    7. HTN:  Adequately controlled on Amlodipine 10mg daily, hydralazine 100mg TID, and Lisinopril 5mg daily.     8. DM2:  A1c 7.0% on 7/18. PTA regimen includes NPH 30 units qAM  and 16 units qPM. Managed in hospital with lantus 16 units qAM and sliding scale. Transitioned back to PTA regimen on admission to TCU, but at lower doses. Patient has been hyperglycemic in evenings.   - Increase Humulin to 16 units in AM and 12 units in PM  - Cont medium intensity correctional scale  - BG checks AC/HS  - Hypoglycemia protocol ordered     9. Acute on chronic anemia:  Acute blood loss noted during hospital stay, requiring 3 units PRBC. Hgb stable at TCU.  - CBC twice weekly    10. Non-severe protein-calorie malnutrition:  Oral intake improving. Nutrition following.     11. Pulmonary nodules:  Noted on serial CT with interval increase in size from 8/12 to 8/29, and new nodules noted on CT 9/18.   - Will need OP follow up for ongoing monitoring  - Will discuss with cardiology team        Diet: Snacks/Supplements Adult: Boost Plus; Between Meals  Combination Diet [id number] Calories: Moderate Consistent CHO (4-6 CHO units/meal)  Snacks/Supplements Adult: Boost Plus; With Meals  Fluids: None  Dobbins Catheter: not present  DVT Prophylaxis: Low Risk/Ambulatory with no VTE prophylaxis indicated  Code Status: Full Code     Therapy Plan:  PT/OT completed 9/25    Disposition Plan   Expected discharge: 1-2 days, recommended to prior living arrangement once immunosuppression plan in place.     Entered: Eric Chu PA-C 09/25/2018, 5:21 PM   Information in the above section will display in the discharge planner report.      The patient's care was discussed with the Attending Physician, Dr. Burton.    Eric Chu PA-C  Internal Medicine Staff Hospitalist Service  [facility]  Pager: [phone]  Please see sticky note for cross cover information  _____________________________________________________________     Interval History   Patient away at dialysis for most of day. Discussed elevated tacro level. Patient denies any hallucinations or tremor. He is otherwise feeling well and somewhat  upset that he will not be able to discharge today. No new complaints.     ROS:  Pulm, CV, GI and  performed and negative unless otherwise noted above.     Data reviewed today: I have personally reviewed all medications, new labs and imaging results over the last 24 hours, as well as pertinent historical data.      Physical Exam   Vital Signs: Temp: 98.8  F (37.1  C) Temp src: Oral BP: 126/81 Pulse: 93 Heart Rate: 88 Resp: 17 SpO2: 97 % O2 Device: None (Room air)    Weight: 172 lbs 6.4 oz    GENERAL:  Awake. Alert. Oriented x 3. NAD.   HEENT:  No scleral icterus. Mucous membranes moist. Fungating mass on anterior hard palate. No purulence or bleeding.   CV:  RRR. S1, S2. No murmurs appreciated.   RESPIRATORY:  Lungs CTAB with no wheezing, rales, rhonchi.   GI:  Abdomen soft, non-distended. Active bowel sounds. No tenderness. Colostomy in LLQ.     NEUROLOGICAL:  No focal deficits. Moves all extremities.    EXTREMITIES:  No peripheral edema. No calf tenderness. Intact bilateral pedal pulses.   SKIN:  No jaundice, rashes, wounds or lesions.            Data   ROUTINE IP LABS (Last four results)    Recent Labs  Lab 09/25/18  1518 09/24/18  0911 09/21/18  0528    137 139   POTASSIUM 3.3* 3.6 3.7   CHLORIDE 102 99 102   CO2 29 28 29   ANIONGAP 7 10 8   * 134* 75   BUN 8 21 14   CR 2.12* 4.41* 2.96*   TRINI 8.2* 8.4* 7.8*       Recent Labs  Lab 09/25/18  1518 09/24/18  0911 09/21/18  0528 09/20/18  0733   WBC 3.8* 2.2* 2.2* 2.6*   RBC 3.13* 3.30* 2.66* 3.11*   HGB 9.6* 10.2* 8.3* 9.7*   HCT 30.5* 32.8* 26.8* 31.6*   MCV 97 99 101* 102*   MCH 30.7 30.9 31.2 31.2   MCHC 31.5 31.1* 31.0* 30.7*   RDW 19.3* 19.4* 19.5* 19.8*    329 208 222     No lab results found in last 7 days.       Recent Labs  Lab 09/25/18  1518 09/21/18  0528 09/20/18  0733 09/19/18  0909   CRP 35.9* 87.4* 125.0* 121.0*          Glucose Values Latest Ref Rng & Units 9/24/2018 9/24/2018 9/24/2018 9/24/2018 9/24/2018 9/25/2018 9/25/2018    Bedside Glucose (mg/dl )  - -- -- -- -- -- -- --   GLUCOSE 70 - 99 mg/dL 123(H) 134(H) 216(H) 261(H) 314(H) 169(H) 152(H)   Some recent data might be hidden        All labs personally reviewed in [employer].  See A&P for additional results.     Unresulted Labs Ordered in the Past 30 Days of this Admission     Date and Time Order Name Status Description    9/25/2018 1518 Tacrolimus level In process     8/14/2018 1143 Platelets prepare order unit In process

## 2018-09-25 NOTE — PLAN OF CARE
Pt alert and oriented x 4, able to make needs known. Up in chair for supper. Remains on IV ABX/Cipro q 24 hrs, Infused via PIV to R arm. Manages colostomy independently. Call within reach.

## 2018-09-25 NOTE — PROGRESS NOTES
[facility]   Cardiology II Consult/ Advanced Heart Failure  Daily Progress Note  Date of Service: 9/25/2018      Patient: Murray Nicholson  MRN: [id number]  Admission Date: 9/11/2018  Hospital Day # 14    Assessment and Plan: Murray Nicholson is a 63 year old male with a PMH of NICM s/p OHT 6/14/18, ESRD on HD, RIJ thrombus, and DM Type II. He presented with hematochezia secondary to colonic abscess, rectal abscess, and colitis.     S/p OHT. 6/14/18 secondary to NICM. Echo 7/20/18 with normal graft function. RHC 9/5/18 with mRA-4, mPA-20, mPCW-10, BECKA CO-10, and BECKA CI-5 with biopsy negative.   Serostatus: HSV1-, HSV1+, CMV D+/R-, EBV D?/R+, VZV+.  Immunosuppression: Prednisone 5 mg po BID. Tac level 23, likely secondary to Cipro/Clotrimazole combination. Unable to assess pt as he is in dialysis, but over the phone he denies tremors and states he is feeling well. Repeat level in AM. Tac remains on hold. Stat BMP per HD unit today.   Prophylaxis:Nystatin. Pentamidine last given 9/14. Valcyte discontinued, weekly CMV negative.   - Continue Lipitor.   - ASA 81 mg po daily resumed 9/8/18.    Jennifer Dean FNP  9/25/2018

## 2018-09-25 NOTE — PROGRESS NOTES
Brief Medicine Note    Full progress note to follow. Patient just returned from dialysis. Tacro 23 yesterday AM. Evening dose held on 9/24 but given this AM. Patient left for dialysis before lab orders could be obtained. Case discussed with cardiology. Suspect clotrimazole/cipro contributing to elevated tacro level, although no apparent signs of toxicity.    Labs obtained upon return from dialysis. Will continue to hold tacro and repeat level in AM. Will also repeat BMP. No K replacement given ESRD and possible CNI toxicity, will simply monitor.     ROUTINE IP LABS (Last four results)    Recent Labs  Lab 09/25/18  1518 09/24/18  0911 09/21/18  0528    137 139   POTASSIUM 3.3* 3.6 3.7   CHLORIDE 102 99 102   CO2 29 28 29   ANIONGAP 7 10 8   * 134* 75   BUN 8 21 14   CR 2.12* 4.41* 2.96*   TRINI 8.2* 8.4* 7.8*            Eric Chu PA-C  Internal Medicine [facility] Hospitalist  [facility]  Pager [phone]

## 2018-09-25 NOTE — PLAN OF CARE
Problem: Goal Outcome Summary  Goal: Goal Outcome Summary  At dialysis most of the day. Labs drawn when he returned. Tacro level tomorrow morning. Pleasant. Independent in his room. Particular about his cares, labs, meds, etc.

## 2018-09-25 NOTE — PLAN OF CARE
Problem: Goal Outcome Summary  Goal: Goal Outcome Summary  Physical Therapy Discharge Summary    Reason for therapy discharge:    Discharged to home with outpatient therapy.    Progress towards therapy goal(s). See [facility] on Care Plan [facility] record for goal details.  Goals met    Therapy recommendation(s):    Continued therapy is recommended.  Rationale/Recommendations:  Recommend outpatient cardiac rehab for continued cardiovascular conditioning training to return to PLOF. Patient also instructed to complete standing HEP daily for maintenance of therapy gains.

## 2018-09-25 NOTE — PLAN OF CARE
Problem: Goal Outcome Summary  Goal: Goal Outcome Summary  Outcome: No Change  Patient independent in room, request not to be disturb during the night, he manages his colostomy independently. Has schedule dialysis today, held Hydralazine as patient prefers to take medication with him to dialysis, will update incoming nurse.

## 2018-09-26 ENCOUNTER — TELEPHONE (OUTPATIENT)
Dept: TRANSPLANT | Facility: CLINIC | Age: 63
End: 2018-09-26

## 2018-09-26 VITALS
HEART RATE: 99 BPM | SYSTOLIC BLOOD PRESSURE: 126 MMHG | RESPIRATION RATE: 18 BRPM | DIASTOLIC BLOOD PRESSURE: 78 MMHG | BODY MASS INDEX: 24.5 KG/M2 | TEMPERATURE: 98.9 F | OXYGEN SATURATION: 99 % | WEIGHT: 165.9 LBS

## 2018-09-26 LAB
ANION GAP SERPL CALCULATED.3IONS-SCNC: 8 MMOL/L (ref 3–14)
BUN SERPL-MCNC: 14 MG/DL (ref 7–30)
CALCIUM SERPL-MCNC: 7.8 MG/DL (ref 8.5–10.1)
CHLORIDE SERPL-SCNC: 104 MMOL/L (ref 94–109)
CO2 SERPL-SCNC: 27 MMOL/L (ref 20–32)
CREAT SERPL-MCNC: 3.24 MG/DL (ref 0.66–1.25)
GFR SERPL CREATININE-BSD FRML MDRD: 19 ML/MIN/1.7M2
GLUCOSE BLDC GLUCOMTR-MCNC: 126 MG/DL (ref 70–99)
GLUCOSE BLDC GLUCOMTR-MCNC: 168 MG/DL (ref 70–99)
GLUCOSE BLDC GLUCOMTR-MCNC: 69 MG/DL (ref 70–99)
GLUCOSE SERPL-MCNC: 97 MG/DL (ref 70–99)
POTASSIUM SERPL-SCNC: 3.6 MMOL/L (ref 3.4–5.3)
SODIUM SERPL-SCNC: 139 MMOL/L (ref 133–144)
TACROLIMUS BLD-MCNC: 8.5 UG/L (ref 5–15)
TME LAST DOSE: NORMAL H

## 2018-09-26 PROCEDURE — 25000132 ZZH RX MED GY IP 250 OP 250 PS 637: Mod: GY | Performed by: NURSE PRACTITIONER

## 2018-09-26 PROCEDURE — 99316 NF DSCHRG MGMT 30 MIN+: CPT | Performed by: PHYSICIAN ASSISTANT

## 2018-09-26 PROCEDURE — 80197 ASSAY OF TACROLIMUS: CPT | Performed by: PHYSICIAN ASSISTANT

## 2018-09-26 PROCEDURE — 80048 BASIC METABOLIC PNL TOTAL CA: CPT | Performed by: PHYSICIAN ASSISTANT

## 2018-09-26 PROCEDURE — A9270 NON-COVERED ITEM OR SERVICE: HCPCS | Mod: GY | Performed by: NURSE PRACTITIONER

## 2018-09-26 PROCEDURE — 25000131 ZZH RX MED GY IP 250 OP 636 PS 637: Mod: GY | Performed by: PHYSICIAN ASSISTANT

## 2018-09-26 PROCEDURE — 25000132 ZZH RX MED GY IP 250 OP 250 PS 637: Mod: GY | Performed by: PHYSICIAN ASSISTANT

## 2018-09-26 PROCEDURE — A9270 NON-COVERED ITEM OR SERVICE: HCPCS | Mod: GY | Performed by: INTERNAL MEDICINE

## 2018-09-26 PROCEDURE — 36415 COLL VENOUS BLD VENIPUNCTURE: CPT | Performed by: PHYSICIAN ASSISTANT

## 2018-09-26 PROCEDURE — 25000132 ZZH RX MED GY IP 250 OP 250 PS 637: Mod: GY | Performed by: INTERNAL MEDICINE

## 2018-09-26 PROCEDURE — 00000146 ZZHCL STATISTIC GLUCOSE BY METER IP

## 2018-09-26 PROCEDURE — A9270 NON-COVERED ITEM OR SERVICE: HCPCS | Mod: GY | Performed by: PHYSICIAN ASSISTANT

## 2018-09-26 PROCEDURE — 25000125 ZZHC RX 250: Performed by: INTERNAL MEDICINE

## 2018-09-26 RX ORDER — AMLODIPINE BESYLATE 10 MG/1
10 TABLET ORAL DAILY
Qty: 30 TABLET | Refills: 0 | Status: SHIPPED | OUTPATIENT
Start: 2018-09-26 | End: 2018-10-23

## 2018-09-26 RX ORDER — TACROLIMUS 1 MG/1
1 CAPSULE ORAL 2 TIMES DAILY
Qty: 60 CAPSULE | Refills: 0 | Status: SHIPPED | OUTPATIENT
Start: 2018-09-26 | End: 2018-12-03

## 2018-09-26 RX ORDER — LISINOPRIL 5 MG/1
5 TABLET ORAL DAILY
Qty: 30 TABLET | Refills: 0 | Status: SHIPPED | OUTPATIENT
Start: 2018-09-27 | End: 2018-10-23

## 2018-09-26 RX ORDER — TACROLIMUS 1 MG/1
1 CAPSULE ORAL
Status: DISCONTINUED | OUTPATIENT
Start: 2018-09-26 | End: 2018-09-26 | Stop reason: HOSPADM

## 2018-09-26 RX ORDER — PANTOPRAZOLE SODIUM 40 MG/1
40 TABLET, DELAYED RELEASE ORAL
Qty: 60 TABLET | Refills: 0 | Status: SHIPPED | OUTPATIENT
Start: 2018-09-26 | End: 2018-11-12

## 2018-09-26 RX ORDER — PENTAMIDINE ISETHIONATE 300 MG/300MG
300 INHALANT RESPIRATORY (INHALATION)
Qty: 300 MG | Refills: 0
Start: 2018-09-26 | End: 2018-12-28

## 2018-09-26 RX ORDER — CLOTRIMAZOLE 10 MG/1
1 LOZENGE ORAL 4 TIMES DAILY
Qty: 40 TROCHE | Refills: 0 | Status: SHIPPED | OUTPATIENT
Start: 2018-09-26 | End: 2018-10-09

## 2018-09-26 RX ORDER — CIPROFLOXACIN 500 MG/1
500 TABLET, FILM COATED ORAL EVERY 24 HOURS
Qty: 14 TABLET | Refills: 0 | Status: SHIPPED | OUTPATIENT
Start: 2018-09-27 | End: 2018-10-09

## 2018-09-26 RX ORDER — CHLORHEXIDINE GLUCONATE ORAL RINSE 1.2 MG/ML
15 SOLUTION DENTAL 2 TIMES DAILY
Qty: 900 ML | Refills: 0 | Status: SHIPPED | OUTPATIENT
Start: 2018-09-26 | End: 2018-10-25

## 2018-09-26 RX ORDER — PREDNISONE 5 MG/1
5 TABLET ORAL 2 TIMES DAILY WITH MEALS
Qty: 60 TABLET | Refills: 0 | Status: SHIPPED | OUTPATIENT
Start: 2018-09-26 | End: 2018-10-15

## 2018-09-26 RX ORDER — NYSTATIN 100000/ML
1000000 SUSPENSION, ORAL (FINAL DOSE FORM) ORAL 4 TIMES DAILY
Qty: 400 ML | Refills: 0 | Status: SHIPPED | OUTPATIENT
Start: 2018-09-26 | End: 2018-10-03 | Stop reason: ALTCHOICE

## 2018-09-26 RX ORDER — TACROLIMUS 1 MG/1
1 CAPSULE ORAL 2 TIMES DAILY
Qty: 60 CAPSULE | Refills: 0 | Status: SHIPPED | OUTPATIENT
Start: 2018-09-26 | End: 2018-09-26

## 2018-09-26 RX ORDER — PREDNISONE 5 MG/1
5 TABLET ORAL 2 TIMES DAILY WITH MEALS
Qty: 60 TABLET | Refills: 0 | Status: SHIPPED | OUTPATIENT
Start: 2018-09-26 | End: 2018-09-26

## 2018-09-26 RX ADMIN — TACROLIMUS 1 MG: 1 CAPSULE ORAL at 17:55

## 2018-09-26 RX ADMIN — PANTOPRAZOLE SODIUM 40 MG: 40 TABLET, DELAYED RELEASE ORAL at 08:34

## 2018-09-26 RX ADMIN — AMLODIPINE BESYLATE 10 MG: 5 TABLET ORAL at 08:33

## 2018-09-26 RX ADMIN — CHLORHEXIDINE GLUCONATE 0.12% ORAL RINSE 15 ML: 1.2 LIQUID ORAL at 08:34

## 2018-09-26 RX ADMIN — NYSTATIN 1000000 UNITS: 100000 SUSPENSION ORAL at 14:24

## 2018-09-26 RX ADMIN — PREDNISONE 5 MG: 5 TABLET ORAL at 08:34

## 2018-09-26 RX ADMIN — TACROLIMUS 1 MG: 1 CAPSULE ORAL at 11:50

## 2018-09-26 RX ADMIN — NYSTATIN 1000000 UNITS: 100000 SUSPENSION ORAL at 17:56

## 2018-09-26 RX ADMIN — HYDRALAZINE HYDROCHLORIDE 100 MG: 50 TABLET ORAL at 14:24

## 2018-09-26 RX ADMIN — NYSTATIN 1000000 UNITS: 100000 SUSPENSION ORAL at 08:34

## 2018-09-26 RX ADMIN — PANTOPRAZOLE SODIUM 40 MG: 40 TABLET, DELAYED RELEASE ORAL at 17:55

## 2018-09-26 RX ADMIN — HYDRALAZINE HYDROCHLORIDE 100 MG: 50 TABLET ORAL at 06:18

## 2018-09-26 RX ADMIN — Medication 1 CAPSULE: at 08:33

## 2018-09-26 RX ADMIN — ASPIRIN 81 MG: 81 TABLET, COATED ORAL at 08:33

## 2018-09-26 RX ADMIN — PREDNISONE 5 MG: 5 TABLET ORAL at 17:56

## 2018-09-26 RX ADMIN — LISINOPRIL 5 MG: 2.5 TABLET ORAL at 08:33

## 2018-09-26 RX ADMIN — MAGNESIUM OXIDE TAB 400 MG (241.3 MG ELEMENTAL MG) 400 MG: 400 (241.3 MG) TAB at 08:33

## 2018-09-26 RX ADMIN — CIPROFLOXACIN HYDROCHLORIDE 500 MG: 500 TABLET, FILM COATED ORAL at 08:33

## 2018-09-26 NOTE — TELEPHONE ENCOUNTER
LM back for Aryan that prelim plans were discussed with NP Jennifer Dean. She will send message to tx team to outline POC.  Enc to call if has further needs.

## 2018-09-26 NOTE — PLAN OF CARE
Problem: Goal Outcome Summary  Goal: Goal Outcome Summary  Outcome: No Change  Patient requesting not to be disturb during the night, he is independent in room and with cares, denies pain and no sign of respiratory distress noted. Patient BP at 0600 was 163/94, schedule Hydralazine given. Continue plan of care

## 2018-09-26 NOTE — PLAN OF CARE
Problem: Goal Outcome Summary  Goal: Goal Outcome Summary  Outcome: No Change  Patient has no complains,he requested print of his labs and he was given,PA saw patient today and discussed patient meds and upcoming discharge home.Blood sugar @ 1300 was 69,no insulin needed.

## 2018-09-26 NOTE — DISCHARGE SUMMARY
Internal Medicine Discharge Summary  [facility]    Date of Admission:  9/11/2018  Date of Discharge:  9/26/2018  Discharging Provider: Eric Chu PA-C       Discharge Diagnoses   1. 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   2. Chronic necrotic oral ulcer with acute Klebsiella infection and possible osteomyelitis.   3. Oral candidiasis.   4.  Leukopenia with neutropenia.  5. NICM s/p orthotopic heart transplant (6/2018).   6. Chronic right internal jugular thrombus, off anticoagulation d/t bleeding risk.   7. ESRD on HD.   8. Type II DM.  9. Hypertension.   10. Acute on chronic anemia.  11. Non-severe malnutrition.  12. Pulmonary nodules.       Hospital Course   Murray Nicholson is a 63 year old male w history of NICM s/p OHT (6/14/18), ESRD on HD, DM2, GERD, HLD, HTN, prior C.diff infection, Pseudomonas bacteremia d/t necrotic oral ulcer, pulmonary nodules, and anemia who was admitted to [facility] for acute complicated diverticulitis with daniella-colonic and daniella-rectal abscesses s/p I&D (8/12/18) and laparoscopic sigmoidectomy with end colostomy and small bowel resection with takedown of enterocolonic fistula (8/14/18). Admitted to TCU on 9/11/18 for rehabilitation. TCU complicated by fever on 9/16 which was felt to be d/t infected chronic oral ulcer and possible osteomyelitis. Also complicated by supratherapeutic tacrolimus level, possibly d/t interaction with antimicrobials.    The following problems were addressed during his TCU stay:      1. Infected necrotic oral ulcer d/t Klebsiella:  Spiked fever up to 101 on 9/16 with associated rise in CRP up to 125. UA negative. Blood cultures NGTD. CT chest/abd/pelvis (9/18) with new L>R lower lobe opacities but otherwise negative for source of fever. No clinical symptoms of pneumonia. Recent Pseudomonas bacteremia  7/2018 with source felt to be chronic necrotic oral ulcer. ENT consulted 9/19. CT maxillofacial negative for osteomyelitis, but per ENT possible small area of erosion. Biopsy 9/19 w necrotic tissue and bacterial overgrowth. Wound culture grew Klebsiella pneumoniae. Case discussed with transplant ID, who recommended treatment with IV cipro. Also started on nystatin and clotrimazole for possible thrush. Patient remained afebrile the remainder of his TCU stay. CRP improved to 35.9 at the time of discharge. Patient was transitioned to oral cipro (renally dosed) prior to discharge. Duration of antibiotics 10-14 days pending clinical improvement. Cardiology would like ongoing follow up with ENT to ensure resolution. Currently not a candidate for surgical interventions. Patient will also follow up with Dr. Lawrence of transplant ID.      2. S/p I&D of daniella-rectal/colonic abscesses (8/12) and laparoscopic sigmoidectomy with end colostomy, small bowel resection, and takedown of enterocolonic fistula (8/14):  CT 8/12 showed inflammatory changes of sigmoid colon c/w diverticulitis with daniella-colonic and daniella-rectal abscesses. Completed 2 weeks of cipro/flagyl ending 8/27. Positive C.diff PCR 8/13. Completed 24 day course PO vancomycin on 9/6 (10 days after completion of cipro/flagyl). CT enterography 8/30 negative for abscess. Post-op course c/b ileus requiring NG decompression. Repeat CT 9/18 due to fever negative for acute findings, although noted to have evidence of gastritis/duodenitis. Patient will follow up with GI for outpatient EGD. He will discharge home on twice daily PPI. Recommend follow up with PCP to ensure EGD completed. Patient has follow up with [facility] on 10/15 with Dr. Tran.      3. Leukopenia with neutropenia:  Felt to be secondary to linezolid per transplant ID. WBC 3.8 today with ANC 2.4 prior to discharge. Recommend repeat labs in 1 week with PCP follow up.     4. NICM s/p OHT (6/14/18):  No evidence of  graft dysfunction. Most recent TTE (7/20) showed hyperkinetic LV function with EF >70%, mild-moderate LVH, normal RV function with mild RV hypertrophy. Negative biopsy 8/24 and 9/5. Cellcept discontinued 9/8 d/t leukopenia. Currently on Tacrolimus and Prednisone. Tacro goal 6-8. Tacro level relatively stable throughout most of TCU stay, but spiked to 23 on 9/24 - felt to be secondary to interactions with cipro and clotrimazole. No other confounding factors. No evidence of toxicity clinically. Dose held 9/25 with repeat tacro level 8/26 of 8.5. Patient will discharge on Tacrolimus 1 mg BID with repeat tacro level planned on 8/28. Patient already has cardiology follow up scheduled next week and right heart cath around 10/8.       5. Hx RIJ thrombus:  Noted post-op. Most recent US 9/11 showed evidence of chronic thrombosis. Per cardiology, continue to hold anticoagulation d/t recent bleeding (previously on Eliquis).      6. ESRD on HD:  Transplant listed 8/29, although needs improvement in nutrition and functional status to be active. No acute concerns. Follow up with Dr. Mcpherson of nephrology as directed    7. HTN:  Adequately controlled on Amlodipine 10mg daily, hydralazine 100mg TID, and Lisinopril 5mg daily.      8. DM2:  A1c 7.0% on 7/18. PTA regimen includes NPH 30 units qAM and 16 units qPM. Managed in hospital with lantus 16 units qAM and sliding scale, likely d/t decreased PO intake with surgery and ileus. Transitioned back to PTA regimen on admission to TCU, but at lower doses. Patient has been hyperglycemic in evenings and insulin dosing was adjusted. Not quite back to baseline AM insulin dosing. Encouraged patient to monitor sugars closely, continue sliding scale insulin to cover, and contact primary care provider for further instructions if he feels that his NPH needs to be adjusted further. He should follow up with PCP in 1 week for further management.       9. Acute on chronic anemia:  Acute blood loss  noted during hospital stay, requiring 3 units PRBC. Hgb stable at TCU.     10. Non-severe protein-calorie malnutrition:  Oral intake improving. Nutrition following.      11. Pulmonary nodules:  Noted on serial CT with interval increase in size from 8/12 to 8/29, and new nodules noted on CT 9/18. Will need OP follow up for ongoing monitoring      Follow-ups Needed After Discharge   - Blood draw Friday 9/28 for repeat Tacro level  - Follow up with cardiology as scheduled next week    Consultations This Hospital Stay   PHYSICAL THERAPY ADULT IP CONSULT  OCCUPATIONAL THERAPY ADULT IP CONSULT  NUTRITION SERVICES ADULT IP CONSULT  WOUND OSTOMY CONTINENCE NURSE  IP CONSULT  OCCUPATIONAL THERAPY ADULT IP CONSULT  WOUND OSTOMY CONTINENCE NURSE  IP CONSULT     Code Status   Full Code    Time Spent on this Encounter   IEric, personally saw the patient today and spent greater than 30 minutes discharging this patient.       Eric Chu PA-C  Internal Medicine Staff Hospitalist Service  [facility]  Pager: [phone]  ______________________________________________________________________    Physical Exam   Vital Signs: Temp: 98.2  F (36.8  C) Temp src: Oral BP: 134/81 Pulse: 91 Heart Rate: 91 Resp: 18 SpO2: 97 % O2 Device: None (Room air)    Weight: 165 lbs 14.4 oz    GENERAL:  Awake. Alert. Oriented x 3. NAD.   HEENT:  No scleral icterus. Mucous membranes moist.   CV:  RRR. S1, S2. No murmurs appreciated.   RESPIRATORY:  Lungs CTAB with no wheezing, rales, rhonchi.   GI:  Abdomen soft, non-distended. Active bowel sounds. No tenderness, guarding, or rebound. No mass or HSM.    NEUROLOGICAL:  No focal deficits. Moves all extremities.    EXTREMITIES:  No peripheral edema. No calf tenderness. Intact bilateral pedal pulses.   SKIN:  No jaundice, rashes, wounds or lesions.       Significant Results and Procedures    ROUTINE IP LABS (Last four results)    Recent Labs  Lab 09/26/18  0651 09/25/18  5698  09/24/18  0911 09/21/18  0528    138 137 139   POTASSIUM 3.6 3.3* 3.6 3.7   CHLORIDE 104 102 99 102   CO2 27 29 28 29   ANIONGAP 8 7 10 8   GLC 97 152* 134* 75   BUN 14 8 21 14   CR 3.24* 2.12* 4.41* 2.96*   TRINI 7.8* 8.2* 8.4* 7.8*       Recent Labs  Lab 09/25/18  1518 09/24/18  0911 09/21/18  0528 09/20/18  0733   WBC 3.8* 2.2* 2.2* 2.6*   RBC 3.13* 3.30* 2.66* 3.11*   HGB 9.6* 10.2* 8.3* 9.7*   HCT 30.5* 32.8* 26.8* 31.6*   MCV 97 99 101* 102*   MCH 30.7 30.9 31.2 31.2   MCHC 31.5 31.1* 31.0* 30.7*   RDW 19.3* 19.4* 19.5* 19.8*    329 208 222     No lab results found in last 7 days.     Glucose Values Latest Ref Rng & Units 9/25/2018 9/25/2018 9/25/2018 9/25/2018 9/26/2018 9/26/2018 9/26/2018   Bedside Glucose (mg/dl )  - -- -- -- -- -- -- --   GLUCOSE 70 - 99 mg/dL 169(H) 152(H) 184(H) 236(H) 97 126(H) 69(L)   Some recent data might be hidden            Pending Results   These results will be followed up by Transplant team  Unresulted Labs Ordered in the Past 30 Days of this Admission     Date and Time Order Name Status Description    9/25/2018 1518 Tacrolimus level In process     8/14/2018 1143 Platelets prepare order unit In process              Primary Care Physician   Yahir Turcios    Discharge Disposition   Discharged to home  Condition at discharge: Stable    Discharge Orders     CARDIAC REHAB REFERRAL     GASTROENTEROLOGY ADULT REF CONSULT ONLY     Reason for your hospital stay   Admitted to TCU for physical rehabilitation following complicated hospitalization. Your TCU stay was complicated by infection of chronic oral ulcer, for which you were treated with IV antibiotics and seen in consultation by ENT.     Adult [facility] Follow-up and recommended labs and tests   Follow up with cardiology core clinic within 1 week.  Tacrolimus level to be drawn in clinic on Friday, 9/28/18.     ENT follow up in 1-2 weeks for monitoring of chronic necrotic oral ulceration with possible osteomyelitis.      Transplant ID as directed by Dr. Lawrence (transplant coordinator to follow up and schedule).     Follow up with Dr. Ernst 10/8/18 at 4:00pm and heart cath with biopsy scheduled 10/10/18.    Follow up with Dr. Tran of colorectal surgery 10/15/18 at 9:30am    Appointments on [place] and/or [facility] (with [facility] or [facility] provider or service). Call [phone] if you haven't heard regarding these appointments within 7 days of discharge.     Follow Up and recommended labs and tests   Follow up with primary care provider, Yahir Turcios, within 7 days for hospital follow- up.  The following labs/tests are recommended: CBC w differential, CMP.     Activity   Your activity upon discharge: activity as tolerated     Full Code     Diet   Follow this diet upon discharge Orders Placed This Encounter     Snacks/Supplements Adult: Boost Plus; Between Meals     Snacks/Supplements Adult: Boost Plus; With Meals     Combination Diet [id number] Calories: Moderate Consistent CHO (4-6 CHO units/meal)       Discharge Medications   Current Discharge Medication List      START taking these medications    Details   chlorhexidine (PERIDEX) 0.12 % solution Swish and spit 15 mLs in mouth 2 times daily  Qty: 900 mL, Refills: 0    Associated Diagnoses: Oral ulceration      ciprofloxacin (CIPRO) 500 MG tablet Take 1 tablet (500 mg) by mouth every 24 hours  Qty: 14 tablet, Refills: 0    Comments: Continue until seen by ENT  Associated Diagnoses: Oral ulceration      clotrimazole 10 MG kalpana Place 1 Kalpana (10 mg) inside cheek 4 times daily  Qty: 40 Kalpana, Refills: 0    Associated Diagnoses: Candidiasis of mouth      !! insulin isophane human (HUMULIN N PEN) 100 UNIT/ML injection Inject 16 Units Subcutaneous every morning (before breakfast)    Associated Diagnoses: Type 2 diabetes mellitus with diabetic nephropathy, with long-term current use of insulin (H)      !! insulin isophane human (HUMULIN N PEN) 100 UNIT/ML injection  Inject 12 Units Subcutaneous daily (with dinner)    Associated Diagnoses: Type 2 diabetes mellitus with diabetic nephropathy, with long-term current use of insulin (H)       !! - Potential duplicate medications found. Please discuss with provider.      CONTINUE these medications which have CHANGED    Details   amLODIPine (NORVASC) 10 MG tablet Take 1 tablet (10 mg) by mouth daily  Qty: 30 tablet, Refills: 0    Associated Diagnoses: Hypertension goal BP (blood pressure) < 130/80      insulin aspart (NOVOLOG PEN) 100 UNIT/ML injection Inject 1-7 Units Subcutaneous 4 times daily (before meals and nightly)  Qty: 3 mL, Refills: 0    Comments: Medium Correctional Scale  For Pre-Meal -189=1 unit, 190-239=2 units, 240-289=3 units, 290-339=4 units, 340-399=5 units, 400-449=6 units, BG = or >450=7 units.  For bedtime -249=1 unit, 250-299=2 units, 300-349=3 units, 350-399=4 units, BG = or >400=5 units.  Associated Diagnoses: Type 2 diabetes mellitus with diabetic nephropathy, with long-term current use of insulin (H)      lisinopril (PRINIVIL/ZESTRIL) 5 MG tablet Take 1 tablet (5 mg) by mouth daily  Qty: 30 tablet, Refills: 0    Associated Diagnoses: Hypertension goal BP (blood pressure) < 130/80      nystatin (MYCOSTATIN) [id number] UNIT/ML suspension Swish and swallow 10 mLs (1,000,000 Units) in mouth 4 times daily  Qty: 400 mL, Refills: 0    Associated Diagnoses: Heart transplant recipient (H)      pantoprazole (PROTONIX) 40 MG EC tablet Take 1 tablet (40 mg) by mouth 2 times daily (before meals)  Qty: 60 tablet, Refills: 0    Associated Diagnoses: Duodenitis      pentamidine (NEBUPENT) 300 MG neb solution Inhale 300 mg into the lungs every 28 days Last given 9/19/18  Qty: 300 mg, Refills: 0    Associated Diagnoses: Heart replaced by transplant (H)      predniSONE (DELTASONE) 5 MG tablet Take 1 tablet (5 mg) by mouth 2 times daily (with meals)  Qty: 60 tablet, Refills: 0    Associated Diagnoses: Heart transplanted  (H)      tacrolimus (GENERIC EQUIVALENT) 1 MG capsule Take 1 capsule (1 mg) by mouth 2 times daily  Qty: 60 capsule, Refills: 0    Associated Diagnoses: Heart transplanted (H)         CONTINUE these medications which have NOT CHANGED    Details   albuterol (PROAIR HFA/PROVENTIL HFA/VENTOLIN HFA) 108 (90 Base) MCG/ACT inhaler Inhale 2 puffs into the lungs every 4 hours as needed for shortness of breath / dyspnea or wheezing      ASPIRIN 81 MG OR TABS Take 1 tablet (81 mg) by mouth at bedtime      biotin (BIOTIN 5000) 5 MG CAPS Take 5 mg by mouth daily  Qty: 30 capsule, Refills: 3    Associated Diagnoses: Brittle nails      blood glucose monitoring (ACCU-CHEK FASTCLIX) lancets Use to test blood sugar 2-3 times daily or as directed.  Ok to substitute alternative if insurance prefers.  Qty: 102 each, Refills: 11    Associated Diagnoses: Type 2 diabetes mellitus with stage 5 chronic kidney disease not on chronic dialysis, without long-term current use of insulin (H)      blood glucose monitoring (NO BRAND SPECIFIED) test strip Use to test blood sugar 2-3 times daily or as directed.  Qty: 100 each, Refills: 11    Associated Diagnoses: Type 2 diabetes mellitus with stage 5 chronic kidney disease not on chronic dialysis, without long-term current use of insulin (H)      fluticasone (FLONASE) 50 MCG/ACT spray Spray 1-2 sprays into both nostrils daily  Qty: 16 g, Refills: 11    Associated Diagnoses: Nasal congestion      glucagon 1 MG SOLR injection Inject 1 mg Subcutaneous every 15 minutes as needed for low blood sugar (May repeat x 1 only)    Associated Diagnoses: Hypoglycemia      glucose 40 % (400 mg/mL) GEL gel Take 15-30 g by mouth every 15 minutes as needed for low blood sugar    Associated Diagnoses: Hypoglycemia      hydrALAZINE (APRESOLINE) 100 MG TABS tablet Take 1 tablet (100 mg) by mouth every 8 hours  Qty: 60 tablet    Associated Diagnoses: Essential hypertension      loratadine (CLARITIN) 10 MG tablet Take 10  mg by mouth daily Reported on 5/3/2017    Associated Diagnoses: Hypertensive cardiopathy; SOB (shortness of breath)      melatonin 1 MG TABS tablet Take 2 tablets (2 mg) by mouth nightly as needed for sleep  Qty: 120 tablet, Refills: 1    Associated Diagnoses: Heart transplanted (H)      NEPHROCAPS 1 MG capsule Take 1 capsule by mouth daily  Qty: 120 capsule, Refills: 0    Associated Diagnoses: End stage renal disease (H)      order for DME Equipment being ordered: Carol ()  Treatment Diagnosis: ESRD on PD  Pt has to be connected to PD all night and can not be disconnected, hence impending his mobility to go to the bathroom. At risk for infection if he does not have this equipment.  Qty: 1 Units, Refills: 0    Associated Diagnoses: CKD (chronic kidney disease) stage 5, GFR less than 15 ml/min (H)      polyethylene glycol (MIRALAX/GLYCOLAX) Packet Take 17 g by mouth daily as needed for constipation  Qty: 7 packet    Associated Diagnoses: Constipation, unspecified constipation type      rosuvastatin (CRESTOR) 20 MG tablet Take 1 tablet (20 mg) by mouth daily  Qty: 30 tablet, Refills: 1    Comments: Replaces Pravastatin with next fill.  Associated Diagnoses: Heart transplant recipient (H)      simethicone (MYLICON) 80 MG chewable tablet Take 1 tablet (80 mg) by mouth every 6 hours as needed for cramping  Qty: 180 tablet    Associated Diagnoses: Flatulence, eructation and gas pain      traMADol (ULTRAM) 50 MG tablet Take 1 tablet (50 mg) by mouth every 12 hours as needed for moderate pain  Qty: 10 tablet, Refills: 0    Associated Diagnoses: Moderate pain      triamcinolone (KENALOG) 0.1 % ointment Apply topically 2 times daily  Qty: 80 g, Refills: 0    Associated Diagnoses: Xerosis of skin      Urea 40 % CREA Externally apply topically daily  Qty: 85 g, Refills: 1    Associated Diagnoses: Brittle nails         STOP taking these medications       acetaminophen (TYLENOL) 325 MG tablet Comments:   Reason for  Stopping:         insulin glargine (LANTUS SOLOSTAR) 100 UNIT/ML pen Comments:   Reason for Stopping:         sodium chloride, PF, 0.9% PF flush Comments:   Reason for Stopping:         sodium chloride, PF, 0.9% PF flush Comments:   Reason for Stopping:             Allergies   Allergies   Allergen Reactions     Norco [Hydrocodone-Acetaminophen] Nausea and Vomiting     Cats      Throat tightness     Isosorbide Other (See Comments)     hypotension     Penicillins Hives     Seasonal Allergies      rhinitis     Shrimp      Throat closes

## 2018-09-26 NOTE — TELEPHONE ENCOUNTER
Provider Call: General  Route to LPN    Reason for call: They would like to give an up to date discharge plan regarding the pt, pt is expected to discharge today or tomorrow.  They also need to verify follow-up plans for pt with clinic and labs.       Call back needed? Yes    Return Call Needed  Same as documented in contacts section  When to return call?: Same day: Route High Priority

## 2018-09-26 NOTE — LETTER
Coordinator: TONY Mcknight Nicholson Schedule revised on 9/26/2018  CMV pos, EBV pending, Toxo pending MRN:  [id number] CMV neg, EBV pos, VZV pos   Physician: Klever Ernst MD  Transplant Date: 6/13/2018  Tues, Thurs and Sat - Dialysis days    Visit  Number Time Post  Transplant Procedures to Expect this Visit Visit Date     Lab with 12-hour level Friday, 9/28/18     Clinic appt with MD Wednesday, 10/3/18     Labs (for CMV) Friday, 10/5/18   Visit #8 16 Weeks  Month 4 Labs, biopsy & brief meeting with MD in recovery Wednesday, 10/10/18     Lab with 12-hour level Friday, 10/12/18     Labs and appointment with Dr. Tran, Colon and Rectal Surgery Monday, 10/15/18     Labs (for CMV) Friday, 10/19/18     Lab with 12-hour level Friday, 10/26/18     Labs (for CMV) Friday, 11/2/18   Visit #9 20 Weeks  Month 5 Lab with 12-hour level & Biopsy. Clinic appt with MD/NP. Monday, 11/5/18   Visit #10 24 Weeks  Month 6 Extended Labs with DSAs, EBV, CMV, RHC/Biopsy.  ECHO. Clinic appt with MD/NP. Daljit Donahue. Monday, 12/3/18   Visit #11 32 Weeks  Month 8 Labs & Biopsy. Clinic appt with ZULAY. Rowan. Friday, 2/1/19   Visit #12 40 Weeks  Month 10 Labs & Biopsy, CMV, EBV. Clinic appt with MD/NP. Week of 3/25/19   Visit #13 1st Annual 2 day visit: Extended Labs with DSAs, EBV, CMV, X-rays, DEXA, ECHO/MRI, Angiogram/RHC/IVUS/Biopsy, CPX, EKG.  Clinic appt with MD/NP. Daljit Donahue.  Week of 6/17/19   * Labs, including Prograf level, will be drawn in the Gold Waiting Room prior to the biopsy unless indicated.  * Please take medication evening before to ensure 12-hour trough the next AM. (i.e. Labs at 8:30 AM, take medication the evening before at 8:30 PM.)  * Do not take medication in AM until after blood drawn.     Friday 9/28/18 7:30 am Lab with 12-hour level [facility]  1st [facility]     Wednesday  10/3/18 2:00 pm Appointment with Dr. Ernst [facility]  3rd floor      Friday  10/5/18 9:00 am Labs (for CMV) [facility]  1st floor     Wednesday  10/10/18    (16 weeks, month 4)    was 10/8/18 1:00 pm Labs for biopsy [facility]  [facility]    1:30 pm Heart biopsy  (Fast for three hours before testing) [facility]  [facility]    Follow in 2A Appointment with Dr. Ernst [facility]  [facility]   Friday  10/12/18 7:30 am Lab with 12-hour level [facility]  1st floor     Monday  10/15/18 9:30 am Appointment with Dr. Tran, Colon and Rectal Surgery [facility]  4th floor     Friday  10/19/18 9:00 am Labs (for CMV) [facility]  1st floor     Friday  10/26/18 7:30 am Labs with 12-hour level [facility]  1st floor     Friday 11/2/18 9:00 am Labs (for CMV) [facility]     Monday 11/5/18    (20 weeks, month 5)    was 11/5/18 8:00 am Labs with 12-hour level [facility]  2nd floor    8:30 am Heart biopsy  (Fast for three hours before testing) [facility]  [facility]    4:00 pm Shuttle to the [facility]    4:30 pm Appointment with Dr. Ernst [facility]  3rd floor     Monday  12/3/18    (24 weeks, month 6) 8:00 am Fasting Labs and [facility] Blood Draw  (nothing to eat or drink after midnight) [facility]  [facility]    8:30 am Echocardiogram [facility]  [facility]    9:30 am Right Heart Cath/Heart biopsy  (Fast for three hours before testing) [facility]  [facility]    3:00 pm Shuttle to the [facility]    3:30 pm Appointment with Dr. Ernst [facility]  3rd floor

## 2018-09-26 NOTE — PLAN OF CARE
Pt alert and oriented x 4. Independent with all ADLs.  No pain or discomfort. tacro level therapeutic. All discharge  paperwork reviewed with pt., questioned answered. Pt Voiced no concern. Changed colostomy pouch independently. PIV discontinued from R fore arm. 1600, 1700, 1800 meds given except Insulin, did not eat BG was 169. All discontinue meds given/filled except Tacro & prednisone, (medicare part B computers down),  prescription sent to outside home pharmacy,  Pt will be picking up that from [facility] on [address].  Pt discontinue home at 1800, picked up by his soon. Took all his belongings.    Vitals:-/78 (BP Location: Right arm)  Pulse 99  Temp 98.9  F (37.2  C) (Oral)  Resp 18  Wt 75.3 kg (165 lb 14.4 oz)  SpO2 99%  BMI 24.5 kg/m2

## 2018-09-26 NOTE — TELEPHONE ENCOUNTER
From: Roxana Lebron   Sent: Wednesday, September 26, 2018 4:03 PM  To: '[email or web address]'  Cc: Roxana Lebron  Subject: Secure - Appointments at the [facility]    Murray:    Attached is a PDF of your most recent schedule.    There are still two things that need to be scheduled by the discharge team (Infectious Disease clinic with Dr. Lawrence in 2-3 weeks and ENT for upper palate ulceration in 2-3 weeks). We will try to get those appts on non-dialysis days, but when asking for appointments so soon, it may not be possible.    Please let me know if you have any questions in the meantime!    Sincerely,    Roxana Lebron  Transplant   Phone [phone]  Fax [phone]  [email or web address]

## 2018-09-26 NOTE — PLAN OF CARE
Problem: Goal Outcome Summary  Goal: Goal Outcome Summary  RN: Pt is alert and oriented, VSS. Pt is independent in his room. Pt reports has no appetite tonight, had very late dinner and ate small amount of food. Surgical incision remains CDI and OBED. Colostomy in place and intact, pt is independent with ostomy care. PT denies pain or SOB and has no complaints. Continue with POC.

## 2018-09-26 NOTE — PROGRESS NOTES
Brief Medicine Note    Anticipate discharge later today. Discharge summary to follow. Patient feeling well, anxious to discharge home.   Cardiology requested images of oral ulcer in order to give final antibiotic recommendations.                Eric Chu PA-C  Internal Medicine [facility] Hospitalist  [facility]  Pager [phone]

## 2018-09-26 NOTE — TELEPHONE ENCOUNTER
September 26, 2018: I spoke with Murray to review the next few days. I assured him I would send a PDF of his schedule via email.    Roxana Lebron  Post-Heart Transplant   [phone]

## 2018-09-28 ENCOUNTER — TELEPHONE (OUTPATIENT)
Dept: TRANSPLANT | Facility: CLINIC | Age: 63
End: 2018-09-28

## 2018-09-28 DIAGNOSIS — Z94.1 HEART REPLACED BY TRANSPLANT (H): ICD-10-CM

## 2018-09-28 DIAGNOSIS — M1A.0390 CHRONIC GOUT OF WRIST, UNSPECIFIED CAUSE, UNSPECIFIED LATERALITY: ICD-10-CM

## 2018-09-28 DIAGNOSIS — Z94.1 HEART REPLACED BY TRANSPLANT (H): Primary | ICD-10-CM

## 2018-09-28 DIAGNOSIS — R50.81 FEVER IN OTHER DISEASES: ICD-10-CM

## 2018-09-28 DIAGNOSIS — M10.9 GOUT, UNSPECIFIED CAUSE, UNSPECIFIED CHRONICITY, UNSPECIFIED SITE: ICD-10-CM

## 2018-09-28 DIAGNOSIS — E87.6 HYPOKALEMIA: ICD-10-CM

## 2018-09-28 LAB
ALP SERPL-CCNC: 109 U/L (ref 40–150)
ALT SERPL W P-5'-P-CCNC: 17 U/L (ref 0–70)
ANION GAP SERPL CALCULATED.3IONS-SCNC: 7 MMOL/L (ref 3–14)
AST SERPL W P-5'-P-CCNC: 21 U/L (ref 0–45)
BASOPHILS # BLD AUTO: 0 10E9/L (ref 0–0.2)
BASOPHILS NFR BLD AUTO: 0.5 %
BILIRUB SERPL-MCNC: 0.6 MG/DL (ref 0.2–1.3)
BUN SERPL-MCNC: 14 MG/DL (ref 7–30)
CALCIUM SERPL-MCNC: 8.4 MG/DL (ref 8.5–10.1)
CHLORIDE SERPL-SCNC: 100 MMOL/L (ref 94–109)
CO2 SERPL-SCNC: 30 MMOL/L (ref 20–32)
CREAT SERPL-MCNC: 3.2 MG/DL (ref 0.66–1.25)
DIFFERENTIAL METHOD BLD: ABNORMAL
EOSINOPHIL # BLD AUTO: 0 10E9/L (ref 0–0.7)
EOSINOPHIL NFR BLD AUTO: 0.7 %
ERYTHROCYTE [DISTWIDTH] IN BLOOD BY AUTOMATED COUNT: 19.1 % (ref 10–15)
GFR SERPL CREATININE-BSD FRML MDRD: 20 ML/MIN/1.7M2
GLUCOSE SERPL-MCNC: 94 MG/DL (ref 70–99)
HCT VFR BLD AUTO: 30.9 % (ref 40–53)
HGB BLD-MCNC: 9.5 G/DL (ref 13.3–17.7)
IMM GRANULOCYTES # BLD: 0.1 10E9/L (ref 0–0.4)
IMM GRANULOCYTES NFR BLD: 1.6 %
LYMPHOCYTES # BLD AUTO: 0.4 10E9/L (ref 0.8–5.3)
LYMPHOCYTES NFR BLD AUTO: 8 %
MCH RBC QN AUTO: 30.5 PG (ref 26.5–33)
MCHC RBC AUTO-ENTMCNC: 30.7 G/DL (ref 31.5–36.5)
MCV RBC AUTO: 99 FL (ref 78–100)
MONOCYTES # BLD AUTO: 0.9 10E9/L (ref 0–1.3)
MONOCYTES NFR BLD AUTO: 21.3 %
NEUTROPHILS # BLD AUTO: 3 10E9/L (ref 1.6–8.3)
NEUTROPHILS NFR BLD AUTO: 67.9 %
NRBC # BLD AUTO: 0 10*3/UL
NRBC BLD AUTO-RTO: 0 /100
PLATELET # BLD AUTO: 275 10E9/L (ref 150–450)
PLATELET # BLD EST: ABNORMAL 10*3/UL
POTASSIUM SERPL-SCNC: 3.3 MMOL/L (ref 3.4–5.3)
RBC # BLD AUTO: 3.11 10E12/L (ref 4.4–5.9)
SODIUM SERPL-SCNC: 137 MMOL/L (ref 133–144)
URATE SERPL-MCNC: 2.4 MG/DL (ref 3.5–7.2)
WBC # BLD AUTO: 4.4 10E9/L (ref 4–11)

## 2018-09-28 PROCEDURE — 80197 ASSAY OF TACROLIMUS: CPT | Performed by: INTERNAL MEDICINE

## 2018-09-28 RX ORDER — POTASSIUM CHLORIDE 1500 MG/1
20 TABLET, EXTENDED RELEASE ORAL DAILY PRN
Qty: 3 TABLET | Refills: 0 | Status: SHIPPED | OUTPATIENT
Start: 2018-09-28 | End: 2018-11-07

## 2018-09-29 LAB
CMV DNA SPEC NAA+PROBE-ACNC: NORMAL [IU]/ML
CMV DNA SPEC NAA+PROBE-LOG#: NORMAL {LOG_IU}/ML
SPECIMEN SOURCE: NORMAL
TACROLIMUS BLD-MCNC: 6.5 UG/L (ref 5–15)
TME LAST DOSE: NORMAL H

## 2018-10-01 ENCOUNTER — TELEPHONE (OUTPATIENT)
Dept: TRANSPLANT | Facility: CLINIC | Age: 63
End: 2018-10-01

## 2018-10-01 ENCOUNTER — PRE VISIT (OUTPATIENT)
Dept: TRANSPLANT | Facility: CLINIC | Age: 63
End: 2018-10-01

## 2018-10-01 DIAGNOSIS — Z94.1 HEART REPLACED BY TRANSPLANT (H): ICD-10-CM

## 2018-10-01 DIAGNOSIS — Z94.1 HEART REPLACED BY TRANSPLANT (H): Primary | ICD-10-CM

## 2018-10-01 LAB
ANION GAP SERPL CALCULATED.3IONS-SCNC: 4 MMOL/L (ref 3–14)
BUN SERPL-MCNC: 33 MG/DL (ref 7–30)
CALCIUM SERPL-MCNC: 8.6 MG/DL (ref 8.5–10.1)
CHLORIDE SERPL-SCNC: 106 MMOL/L (ref 94–109)
CO2 SERPL-SCNC: 27 MMOL/L (ref 20–32)
CREAT SERPL-MCNC: 5.42 MG/DL (ref 0.66–1.25)
GFR SERPL CREATININE-BSD FRML MDRD: 11 ML/MIN/1.7M2
GLUCOSE SERPL-MCNC: 203 MG/DL (ref 70–99)
POTASSIUM SERPL-SCNC: 3.9 MMOL/L (ref 3.4–5.3)
SODIUM SERPL-SCNC: 138 MMOL/L (ref 133–144)

## 2018-10-01 NOTE — TELEPHONE ENCOUNTER
Called patient with lab results. Tacro 6.5 (goal 6-8). Confirmed 12-hour trough and current dose 1 mg BID. No dose changes. Uric acid 2.4 (down from 4.4 in August), discuss at clinic visit. CMV neg. Patient supposed to have K+ rechecked today after PRN dose called in Friday. Patient reports that he has been having issues with his insurance and he was unable to get the prescription filled. Patient stating that he has multiple tiers of coverage and the Pharm needs most up to date insurance coverage. Called [facility] pharmacy, patient needs to update his current insurance info, K+ over the counter (3 pills) ~$11.00. Patient's info sent to SW and Pt.  to help with insurance issues going forward. Patient still plans to have labs drawn today. Plan to recheck K+ today and f/u with patient with result. May need to emphasize the importance of K+ replacement if level still low and pt. may need to pay OTC today for this med.  sent message to assist patient in scheduling f/u ENT and ID appointment. Patient here 10/3 for clinic visit.

## 2018-10-01 NOTE — TELEPHONE ENCOUNTER
Potassium 3.3, instructed pt to take 20mEq of potassium x1 day and will recheck labs when pt returns to see Dr. Ernst next week.   Confirmed Dr. Ernst appt on 10/3 at 2p, labs prior. Transplant office will arrange ENT and ID follow up per discharge summary.  FK level pending.   Pt verbalized understanding.

## 2018-10-02 ENCOUNTER — TELEPHONE (OUTPATIENT)
Dept: TRANSPLANT | Facility: CLINIC | Age: 63
End: 2018-10-02

## 2018-10-02 DIAGNOSIS — Z94.1 HEART REPLACED BY TRANSPLANT (H): Primary | ICD-10-CM

## 2018-10-02 NOTE — TELEPHONE ENCOUNTER
Called pt to review tomorrow's appt - labs and MD appt. No fasting, no level.  Enc to call with questions.

## 2018-10-03 ENCOUNTER — TELEPHONE (OUTPATIENT)
Dept: TRANSPLANT | Facility: CLINIC | Age: 63
End: 2018-10-03

## 2018-10-03 ENCOUNTER — RESULTS ONLY (OUTPATIENT)
Dept: OTHER | Facility: CLINIC | Age: 63
End: 2018-10-03

## 2018-10-03 ENCOUNTER — OFFICE VISIT (OUTPATIENT)
Dept: CARDIOLOGY | Facility: CLINIC | Age: 63
End: 2018-10-03
Attending: INTERNAL MEDICINE
Payer: MEDICARE

## 2018-10-03 VITALS
OXYGEN SATURATION: 100 % | DIASTOLIC BLOOD PRESSURE: 74 MMHG | SYSTOLIC BLOOD PRESSURE: 108 MMHG | WEIGHT: 171.4 LBS | HEIGHT: 69 IN | BODY MASS INDEX: 25.39 KG/M2 | HEART RATE: 95 BPM

## 2018-10-03 DIAGNOSIS — Z94.1 HEART REPLACED BY TRANSPLANT (H): ICD-10-CM

## 2018-10-03 DIAGNOSIS — N18.6 ESRD (END STAGE RENAL DISEASE) (H): ICD-10-CM

## 2018-10-03 DIAGNOSIS — R50.81 FEVER IN OTHER DISEASES: ICD-10-CM

## 2018-10-03 DIAGNOSIS — Z94.1 HEART TRANSPLANTED (H): Primary | ICD-10-CM

## 2018-10-03 DIAGNOSIS — Z76.82 AWAITING ORGAN TRANSPLANT: ICD-10-CM

## 2018-10-03 LAB
ALBUMIN UR-MCNC: 100 MG/DL
AMORPH CRY #/AREA URNS HPF: ABNORMAL /HPF
ANION GAP SERPL CALCULATED.3IONS-SCNC: 8 MMOL/L (ref 3–14)
APPEARANCE UR: ABNORMAL
BACTERIA #/AREA URNS HPF: ABNORMAL /HPF
BILIRUB UR QL STRIP: ABNORMAL
BUN SERPL-MCNC: 25 MG/DL (ref 7–30)
CALCIUM SERPL-MCNC: 8.5 MG/DL (ref 8.5–10.1)
CHLORIDE SERPL-SCNC: 99 MMOL/L (ref 94–109)
CO2 SERPL-SCNC: 28 MMOL/L (ref 20–32)
COLOR UR AUTO: ABNORMAL
CREAT SERPL-MCNC: 4.49 MG/DL (ref 0.66–1.25)
CRP SERPL-MCNC: 114 MG/L (ref 0–8)
ERYTHROCYTE [DISTWIDTH] IN BLOOD BY AUTOMATED COUNT: 19.2 % (ref 10–15)
GFR SERPL CREATININE-BSD FRML MDRD: 13 ML/MIN/1.7M2
GLUCOSE SERPL-MCNC: 139 MG/DL (ref 70–99)
GLUCOSE UR STRIP-MCNC: NEGATIVE MG/DL
HCT VFR BLD AUTO: 32 % (ref 40–53)
HGB BLD-MCNC: 9.5 G/DL (ref 13.3–17.7)
HGB UR QL STRIP: NEGATIVE
KETONES UR STRIP-MCNC: NEGATIVE MG/DL
LEUKOCYTE ESTERASE UR QL STRIP: ABNORMAL
MAGNESIUM SERPL-MCNC: 1.4 MG/DL (ref 1.6–2.3)
MCH RBC QN AUTO: 29.9 PG (ref 26.5–33)
MCHC RBC AUTO-ENTMCNC: 29.7 G/DL (ref 31.5–36.5)
MCV RBC AUTO: 101 FL (ref 78–100)
NITRATE UR QL: NEGATIVE
PH UR STRIP: 5 PH (ref 5–7)
PHOSPHATE SERPL-MCNC: 1.5 MG/DL (ref 2.5–4.5)
PLATELET # BLD AUTO: 294 10E9/L (ref 150–450)
POTASSIUM SERPL-SCNC: 4.4 MMOL/L (ref 3.4–5.3)
RBC # BLD AUTO: 3.18 10E12/L (ref 4.4–5.9)
RBC #/AREA URNS AUTO: 3 /HPF (ref 0–2)
SODIUM SERPL-SCNC: 135 MMOL/L (ref 133–144)
SOURCE: ABNORMAL
SP GR UR STRIP: 1.02 (ref 1–1.03)
SQUAMOUS #/AREA URNS AUTO: 2 /HPF (ref 0–1)
URATE SERPL-MCNC: 3.8 MG/DL (ref 3.5–7.2)
UROBILINOGEN UR STRIP-MCNC: 0 MG/DL (ref 0–2)
WBC # BLD AUTO: 7.8 10E9/L (ref 4–11)
WBC #/AREA URNS AUTO: 10 /HPF (ref 0–5)

## 2018-10-03 PROCEDURE — 36415 COLL VENOUS BLD VENIPUNCTURE: CPT | Performed by: INTERNAL MEDICINE

## 2018-10-03 PROCEDURE — G0463 HOSPITAL OUTPT CLINIC VISIT: HCPCS | Mod: ZF

## 2018-10-03 PROCEDURE — 86140 C-REACTIVE PROTEIN: CPT | Performed by: INTERNAL MEDICINE

## 2018-10-03 PROCEDURE — 83735 ASSAY OF MAGNESIUM: CPT | Performed by: INTERNAL MEDICINE

## 2018-10-03 PROCEDURE — 87086 URINE CULTURE/COLONY COUNT: CPT | Performed by: NURSE PRACTITIONER

## 2018-10-03 PROCEDURE — 86832 HLA CLASS I HIGH DEFIN QUAL: CPT | Performed by: TRANSPLANT SURGERY

## 2018-10-03 PROCEDURE — 80048 BASIC METABOLIC PNL TOTAL CA: CPT | Performed by: INTERNAL MEDICINE

## 2018-10-03 PROCEDURE — 81001 URINALYSIS AUTO W/SCOPE: CPT | Performed by: NURSE PRACTITIONER

## 2018-10-03 PROCEDURE — 84100 ASSAY OF PHOSPHORUS: CPT | Performed by: INTERNAL MEDICINE

## 2018-10-03 PROCEDURE — 86833 HLA CLASS II HIGH DEFIN QUAL: CPT | Performed by: TRANSPLANT SURGERY

## 2018-10-03 PROCEDURE — 84550 ASSAY OF BLOOD/URIC ACID: CPT | Performed by: INTERNAL MEDICINE

## 2018-10-03 PROCEDURE — 85027 COMPLETE CBC AUTOMATED: CPT | Performed by: INTERNAL MEDICINE

## 2018-10-03 PROCEDURE — 99214 OFFICE O/P EST MOD 30 MIN: CPT | Mod: ZP | Performed by: INTERNAL MEDICINE

## 2018-10-03 ASSESSMENT — PAIN SCALES - GENERAL: PAINLEVEL: NO PAIN (0)

## 2018-10-03 NOTE — PROGRESS NOTES
Service Date: 10/03/2018      October 3, 2018      Yahir Turcios MD    [facility]    [address] [place]  [address]       Ana Luisa Mcpherson MD    [facility]    [address]    [place]  [address]       RE: Murray Nicholson   MRN: [id number]   : 1955      Dear Dr. Turcios and Dr. Mcpherson:      We had the pleasure of seeing Mr. Murray Nicholson for followup in our Cardiac Transplant Clinic at the [facility].  As you know, he is a very pleasant 63-year-old male who underwent orthotopic heart transplantation on 2018.  He is currently listed for kidney transplantation.  He had a very complex postoperative course.  His current problem list includes:     1.  Status post orthotopic heart transplantation.   2.  Neutropenia.   3.  Oral mucosal ulcer secondary to neutropenia with Klebsiella infection.   4.  Pseudomonas bacteremia, resolved.   5.  Perforation of the small bowel, status post small-bowel resection and ileostomy.   6.  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   7.  Hypertension.   8.  Hyperlipidemia.   9.  End-stage renal disease requiring hemodialysis.      Mr. Nicholson was recently discharged from the TCU.  He had a very prolonged hospitalization during the month of August for a perforated bowel.  He had to undergo emergency small bowel resection with ileostomy.  Postoperative course was complicated by bowel obstruction, which gradually resolved.  He also had an oral ulcer which grew Klebsiella.  He did not have Klebsiella bacteremia.  He was treated with p.o. ciprofloxacin.  He has had significant neutropenia with CellCept.  In light of this, he has been off of CellCept and he is just on prednisone and tacrolimus.  He is off Valcyte secondary to his neutropenia.  He is a high risk for CMV.  We have been checking weekly CMV titers.  He is off of Bactrim secondary to neutropenia.  He is on pentamidine.      Mr. Nicholson has been  home for the last week.  Overall, he has been feeling okay.  He lives alone.  He is able to drive to appointments and to dialysis by himself.  He is able to take care of himself. He had 1 low-grade fever last night - 100.2.  He denies having chills.  A complete review of systems was obtained and was unremarkable.  He has no chest pain, no shortness of breath.  He has no chills.  He has minimal upper respiratory tract symptoms.  He had some runny nose but it has been there constant for the last 4 weeks.  He has no headache.  No visual changes.  No problems with his swallowing.  No nausea or vomiting.  He denies having cough.  He has no chest pain.  He has no abdominal pain.  His ileostomy output is stable.  He has not noticed any blood.  He has no pain when he passes urine.  He still makes some urine.  He has no rashes.  His appetite has been overall okay.      MEDICATIONS:   1.  Prednisone 5 mg b.i.d.   2.  Tacrolimus 1 b.i.d.   3.  Aspirin 81 mg daily.   4.  Hydralazine 100 mg 3 times a day.   5.  Lisinopril 5 mg daily.   6.  Norvasc 10 mg daily.   7.  Clotrimazole troches 4 times daily.   8.  Ciprofloxacin 500 mg daily for 14 days.   9.  Chlorhexidine swish and swallow twice daily.   10.  Crestor 20 mg daily.   11.  NPH insulin 12 units in the morning and 16 units in the evening.   12.  NovoLog insulin sliding scale.   13.  Biotin 5 mg daily.   12.  Protonix 40 mg twice a day.      REVIEW OF SYSTEMS:  A detailed 10-point review of systems was obtained as described in History of Present Illness.  All other systems are reviewed and are negative.      PHYSICAL EXAMINATION:   GENERAL:  He was comfortable.  He was in no apparent distress.   VITAL SIGNS:  Temperature 98.2, blood pressure 108/74, pulse rate 95, respiratory rate 16.  He was weight was 77.7 kilograms.   HEENT:  No pallor, cyanosis, or jaundice.  Examination of the oral cavity revealed an ulcerative lesion on the upper palate covered with unhealthy  granulation tissue, albeit this looks better when compared to last week.  Otherwise, oral cavity examination is normal.   NECK:  Neck exam reveals no jugular venous distention.  Carotids were 2+.  No lymphadenopathy.   LUNGS:  Clear to auscultation bilaterally.   CARDIAC:  Cardiac auscultation revealed normal S1, S2 with no murmur, rub or gallop.   LUNGS:  Auscultation of the lungs revealed equal air entry on both.     ABDOMEN:  Abdomen was soft with normal bowel sounds.  Ostomy site looks normal.   SKIN:  Skin exam is normal.   NEUROLOGIC:  He had no focal neurological deficit.   EXTREMITIES:  Showed 1+ pitting edema.      LABORATORY:  CBC showed a hemoglobin of 9.5, hematocrit of 32, WBC 7.8 and a platelet count of 294.  Electrolytes show sodium of 135, potassium 4.4, chloride of 99, bicarbonate of 28, BUN of 25, creatinine 4.49, magnesium of 1.4, phosphorus 1.5, calcium of 8.  His CRP was significantly elevated at 114.  His uric acid was 3.8.      ASSESSMENT AND PLAN:      Mr. Murray Nicholson is a 63-year-old male status post orthotopic heart transplantation in June with a very complex postoperative course who returns today for followup.     1.  Orthotopic heart transplantation.  Mr. Nicholson has normal graft function.  He has not had any evidence of rejection, fortunately, so far.  His biopsies have been negative.  His graft systolic function has been normal.  He is currently on prednisone 5 mg b.i.d. and tacrolimus.  He is off of CellCept secondary to neutropenia.  Tacrolimus goal is 6-8 given his multiple infections.  We will continue to wean the prednisone based on his biopsies.  He is on aspirin and Crestor for primary prevention of allograft vasculopathy.     2.  Hypertension.  His blood pressure is currently controlled on hydralazine 100 mg 3 times a day, lisinopril 5 mg and Norvasc 10 mg.  I suspect his lower extremity swelling is secondary to Norvasc.     3.  End-stage renal disease.  He continues to remain  on dialysis 3 times a week.  He has been tolerating it with no problems.  His dry weight has recently been increased.  With that, he is not having any lightheadedness or dizziness.     4.  Neutropenia.  His white blood cell count has been stable off of CellCept, Bactrim and Valcyte.  We will continue to monitor.  We will consider restarting Bactrim on 10/19 if his white cell count remains stable.  Otherwise, we will use pentamidine.     5.  High risk for CMV.  He is off of Valcyte secondary to neutropenia.  We have been doing weekly CMV checks.  This has been negative.     6.  Oral ulcer with Klebsiella.  He has been evaluated by ENT.  He had a bedside debridement last week.  His CT scan shows no evidence of osteomyelitis at this time and point.  He is currently on empiric ciprofloxacin for 14 days.  This looks like this is slowly getting better.  He is also on the chlorhexidine swish and swallow twice a day.     7.  Perforated bowel, status post colostomy, doing well, tolerating diet, normal ostomy site output.     8.  Low-grade fever with elevated CRP.  He had a temperature of 100.2 last night.  His white blood count is higher than his baseline.  CRP is going up.  All these are concerning for infection.  The source of his infection is unclear to me.  His urinalysis shows some mildly positive leukocyte esterase and nitrites.  His urine culture is pending.  It is possible that he has a urinary tract infection.  However, he is clinically asymptomatic.  Thus, I doubt that he has a urinary tract infection.  He does have this Klebsiella infection of his oral ulcer which could be persistent.  I have recommended him to have repeat blood cultures drawn to make sure that he does not have bacteremia.  I will repeat his CBC and CRP in 48 hours.  He will continue on ciprofloxacin.  I have recommended him to call us if he has recurrent fever.  If he were to have recurrent fever, I suspect that we need to scan his abdomen as  well as his oral cavity.  CMV has been negative so far.     9.  Diabetes.  He is on NPH insulin.  He also uses NovoLog as needed with a sliding scale.  He was not using the sliding scale properly.  I have instructed him to use it properly.      He has not been very clear on his medications. He is meeting with a pharmacist later today.  He does not need to be on Nystatin.  He does not need to be on Eliquis; however, he was erroneously taking this.  I have instructed him to discontinue Eliquis.  He will return to see me next week for a biopsy.  We will have his labs repeated in 24-48 hours.  He will call us if he has any worsening symptoms, in that case will consider scanning him and admitting him to the hospital.      Sincerely,   Klever Ernst MD   [facility]  Heart Failure, Transplant, and Mechanical Circulatory Support Cardiology   Cardiovascular Division  [facility]   [phone]          D: 10/03/2018   T: 10/03/2018   MT: SHELLEY      Name:     SANCHEZ MCNEIL   MRN:      [id number]        Account:      [id number]   :      1955           Service Date: 10/03/2018      Document: [id number]

## 2018-10-03 NOTE — LETTER
10/3/2018      RE: Murray Nicholson  [address]  [place] [address]       Dear Tres Turcios and Vida,    We had the pleasure of seeing Mr. Murray Vasquez cardiac transplant clinic continues to [facility].  As you know he is a very pleasant 63-year-old gentleman who underwent orthotopic heart transplantation on 6/14/2018.  He is currently listed for kidney transplant.  He returns today for his first follow-up visit.    Mr. Nicholson had an overall uneventful postoperative course following his transplant.  He had a leukocytosis of unclear etiology for which he received 10 days of antibiotics.  Mr. Roper was doing okay up until last week when he was noted to have neutropenia.  His CMV serology was negative.  We decreased his CellCept to 750 mg twice a day, stopped his Bactrim and gave him pentamidine, and stopped his Valcyte as well.  He was getting weekly CMV checks.    Mr. Nicholson has been having fevers on and off for the last 3 days.  He had a sore throat earlier this week which is resolved.  He is complaining of increased saliva very secretions.  He has developed a new ulcer in his oral cavity on the right side of the hard palate.  He has been fatigued and weak.  He has chills.  He is sleeping more than usual.  He fell while returning back home from dialysis.    He denies having cough, shortness of breath, abdominal pain, vomiting, diarrhea or skin rashes.  He has some neck pain.  He has been having headache on and off.  He denies having blurred vision or double vision.  He has no erythema, pain, and discharge from the IV catheter site.    His endomyocardial biopsies have been negative.  His prednisone has been tapered to 15 and 20.    Past medical history    1. S/P OHT  2. Type 2 diabetes mellitus   3. Hyperlipidemia  4. Hypertension  5. Gout  6. End-stage renal disease requiring dialysis on HD    Past surgical history  #1 hernia repair  #2 PD catheter placement    Medications  Current  Outpatient Prescriptions   Medication Sig     albuterol (PROAIR HFA/PROVENTIL HFA/VENTOLIN HFA) 108 (90 Base) MCG/ACT inhaler Inhale 2 puffs into the lungs every 4 hours as needed for shortness of breath / dyspnea or wheezing     amLODIPine (NORVASC) 10 MG tablet Take 1 tablet (10 mg) by mouth daily     ASPIRIN 81 MG OR TABS Take 1 tablet (81 mg) by mouth at bedtime     biotin (BIOTIN 5000) 5 MG CAPS Take 5 mg by mouth daily     blood glucose monitoring (ACCU-CHEK FASTCLIX) lancets Use to test blood sugar 2-3 times daily or as directed.  Ok to substitute alternative if insurance prefers.     blood glucose monitoring (NO BRAND SPECIFIED) test strip Use to test blood sugar 2-3 times daily or as directed.     chlorhexidine (PERIDEX) 0.12 % solution Swish and spit 15 mLs in mouth 2 times daily     ciprofloxacin (CIPRO) 500 MG tablet Take 1 tablet (500 mg) by mouth every 24 hours     clotrimazole 10 MG kalpana Place 1 Kalpana (10 mg) inside cheek 4 times daily     fluticasone (FLONASE) 50 MCG/ACT spray Spray 1-2 sprays into both nostrils daily     glucagon 1 MG SOLR injection Inject 1 mg Subcutaneous every 15 minutes as needed for low blood sugar (May repeat x 1 only)     glucose 40 % (400 mg/mL) GEL gel Take 15-30 g by mouth every 15 minutes as needed for low blood sugar     hydrALAZINE (APRESOLINE) 100 MG TABS tablet Take 1 tablet (100 mg) by mouth every 8 hours     insulin aspart (NOVOLOG PEN) 100 UNIT/ML injection Inject 1-7 Units Subcutaneous 4 times daily (before meals and nightly)     insulin isophane human (HUMULIN N PEN) 100 UNIT/ML injection Inject 16 Units Subcutaneous every morning (before breakfast)     insulin isophane human (HUMULIN N PEN) 100 UNIT/ML injection Inject 12 Units Subcutaneous daily (with dinner)     lisinopril (PRINIVIL/ZESTRIL) 5 MG tablet Take 1 tablet (5 mg) by mouth daily     loratadine (CLARITIN) 10 MG tablet Take 10 mg by mouth daily Reported on 5/3/2017     melatonin 1 MG TABS tablet  "Take 2 tablets (2 mg) by mouth nightly as needed for sleep     NEPHROCAPS 1 MG capsule Take 1 capsule by mouth daily     order for DME Equipment being ordered: Carol ()  Treatment Diagnosis: ESRD on PD  Pt has to be connected to PD all night and can not be disconnected, hence impending his mobility to go to the bathroom. At risk for infection if he does not have this equipment.     pantoprazole (PROTONIX) 40 MG EC tablet Take 1 tablet (40 mg) by mouth 2 times daily (before meals)     pentamidine (NEBUPENT) 300 MG neb solution Inhale 300 mg into the lungs every 28 days Last given 9/19/18     polyethylene glycol (MIRALAX/GLYCOLAX) Packet Take 17 g by mouth daily as needed for constipation     potassium chloride SA (KLOR-CON) 20 MEQ CR tablet Take 1 tablet (20 mEq) by mouth daily as needed for potassium supplementation     predniSONE (DELTASONE) 5 MG tablet Take 1 tablet (5 mg) by mouth 2 times daily (with meals)     rosuvastatin (CRESTOR) 20 MG tablet Take 1 tablet (20 mg) by mouth daily     simethicone (MYLICON) 80 MG chewable tablet Take 1 tablet (80 mg) by mouth every 6 hours as needed for cramping     tacrolimus (GENERIC EQUIVALENT) 1 MG capsule Take 1 capsule (1 mg) by mouth 2 times daily     traMADol (ULTRAM) 50 MG tablet Take 1 tablet (50 mg) by mouth every 12 hours as needed for moderate pain     triamcinolone (KENALOG) 0.1 % ointment Apply topically 2 times daily     Urea 40 % CREA Externally apply topically daily     No current facility-administered medications for this visit.      Review of system:  A detailed 10 point review of system obtained as described in history of present illness all other systems reviewed and are negative      Examination:  He was tachypneic.  He was having chills.  /74 (BP Location: Left arm, Patient Position: Chair, Cuff Size: Adult Regular)  Pulse 95  Ht 1.753 m (5' 9\")  Wt 77.7 kg (171 lb 6.4 oz)  SpO2 100%  BMI 25.31 kg/m2. He had no jugular venous distention. "  He had no paler cyanosis or jaundice.  His carotids were 1+ bilaterally.  His pulse was regular rate and rhythm.  Cardiac auscultation revealed a normal S1 soft S2 and an early diastolic murmur in the aortic area.   Auscultation of his lungs reveal equal air entry on both sides with no added sounds.  His abdomen was soft with normal bowel sounds no tenderness no rigidity no guarding. He had no focal neurological deficit.  His extremities showed no edema.  He had 5 x 3 cm ulcerative lesion on the right side of his upper palate with necrotic base concerning for necrotizing infection.    CBC RESULTS:   Recent Labs   Lab Test  07/23/18   0914   WBC  1.5*   RBC  2.29*   HGB  7.0*   HCT  21.2*   MCV  93   MCH  30.6   MCHC  33.0   RDW  21.0*   PLT  216       Recent Labs   Lab Test  07/26/18   1732  07/23/18   0914   NA  129*  130*   POTASSIUM  4.4  5.3   CHLORIDE  94  99   CO2  26  18*   ANIONGAP  9  12   GLC  158*  285*   BUN  22  68*   CR  2.96*  7.09*   TRINI  8.4*  8.6       FK level - 8.6    CMV - Negative    Echo: Global and regional left ventricular function is hyperkinetic with an EF >70%.  Mild-moderate left ventricular hypertrophy.  Right ventricle with normal size and systolic function with mild hypertrophy.  No significant valve disease and no change in LVEF.    EKG: Sinus tachycardia    Assessment and Plan:    Mr. Nicholson is a very pleasant 63-year-old gentleman S/P OHT who returns today for follow up.     Impression:   1. Febrile neutropenia with oral mucosal ulcer  2. Concern for sepsis in the setting of neutropenia, immunosuppression, and dialysis catheter.     Plan:  1. Will send him to ER for admission  2. IV fluids  3. Pan culture and chest x-ray  3. IV empiric antibiotics - Vancomycin and zosyn - renally dose adjusted. Transplant ID consult  4. Hematology consult - will need to give GM-CSF  5. ENT consult to evaluate the oral ulcers.   6. Will continue FK and consider decreasing cellcept dose further 250  mg bid  7. Immuknow as an inpatient  8. Will continue to hold bactrim and valcyte for now.       Sincerely,  Klever Ernst MD   [facility]  Heart Failure, Transplant, and Mechanical Circulatory Support Cardiology   Cardiovascular Division  [facility]   [phone]          Service Date: 10/03/2018      October 3, 2018      Yahir Turcios MD    [place] [phone]       Ana Luisa Mcpherson MD    [address], [facility] [address]    [place]  [address]       RE: Murray Nicholson   MRN: [id number]   : 1955      Dear Dr. Turcios and Dr. Mcpherson:      We had the pleasure of seeing Mr. Murray Nicholson for followup in our Cardiac Transplant Clinic at the [facility].  As you know, he is a very pleasant 63-year-old male who underwent orthotopic heart transplantation on 2018.  He is currently listed for kidney transplantation.  He had a very complex postoperative course.  His current problem list includes:     1.  Status post orthotopic heart transplantation.   2.  Neutropenia.   3.  Oral mucosal ulcer secondary to neutropenia with Klebsiella infection.   4.  Pseudomonas bacteremia, resolved.   5.  Perforation of the small bowel, status post small-bowel resection and ileostomy.   6.  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   7.  Hypertension.   8.  Hyperlipidemia.   9.  End-stage renal disease requiring hemodialysis.      Mr. Nicholson was recently discharged from the TCU.  He had a very prolonged hospitalization during the month of August for a perforated bowel.  He had to undergo emergency small bowel resection with ileostomy.  Postoperative course was complicated by bowel obstruction, which gradually resolved.  He also had an oral ulcer which grew Klebsiella.  He did not have Klebsiella bacteremia.  He was treated with p.o. ciprofloxacin.  He has had significant  neutropenia with CellCept.  In light of this, he has been off of CellCept and he is just on prednisone and tacrolimus.  He is off Valcyte secondary to his neutropenia.  He is a high risk for CMV.  We have been checking weekly CMV titers.  He is off of Bactrim secondary to neutropenia.  He is on pentamidine.      Mr. Nicholson has been home for the last week.  Overall, he has been feeling okay.  He lives alone.  He is able to drive to appointments and to dialysis by himself.  He is able to take care of himself. He had 1 low-grade fever last night - 100.2.  He denies having chills.  A complete review of systems was obtained and was unremarkable.  He has no chest pain, no shortness of breath.  He has no chills.  He has minimal upper respiratory tract symptoms.  He had some runny nose but it has been there constant for the last 4 weeks.  He has no headache.  No visual changes.  No problems with his swallowing.  No nausea or vomiting.  He denies having cough.  He has no chest pain.  He has no abdominal pain.  His ileostomy output is stable.  He has not noticed any blood.  He has no pain when he passes urine.  He still makes some urine.  He has no rashes.  His appetite has been overall okay.      MEDICATIONS:   1.  Prednisone 5 mg b.i.d.   2.  Tacrolimus 1 b.i.d.   3.  Aspirin 81 mg daily.   4.  Hydralazine 100 mg 3 times a day.   5.  Lisinopril 5 mg daily.   6.  Norvasc 10 mg daily.   7.  Clotrimazole troches 4 times daily.   8.  Ciprofloxacin 500 mg daily for 14 days.   9.  Chlorhexidine swish and swallow twice daily.   10.  Crestor 20 mg daily.   11.  NPH insulin 12 units in the morning and 16 units in the evening.   12.  NovoLog insulin sliding scale.   13.  Biotin 5 mg daily.   12.  Protonix 40 mg twice a day.      REVIEW OF SYSTEMS:  A detailed 10-point review of systems was obtained as described in History of Present Illness.  All other systems are reviewed and are negative.      PHYSICAL EXAMINATION:   GENERAL:  He  was comfortable.  He was in no apparent distress.   VITAL SIGNS:  Temperature 98.2, blood pressure 108/74, pulse rate 95, respiratory rate 16.  He was weight was 77.7 kilograms.   HEENT:  No pallor, cyanosis, or jaundice.  Examination of the oral cavity revealed an ulcerative lesion on the upper palate covered with unhealthy granulation tissue, albeit this looks better when compared to last week.  Otherwise, oral cavity examination is normal.   NECK:  Neck exam reveals no jugular venous distention.  Carotids were 2+.  No lymphadenopathy.   LUNGS:  Clear to auscultation bilaterally.   CARDIAC:  Cardiac auscultation revealed normal S1, S2 with no murmur, rub or gallop.   LUNGS:  Auscultation of the lungs revealed equal air entry on both.     ABDOMEN:  Abdomen was soft with normal bowel sounds.  Ostomy site looks normal.   SKIN:  Skin exam is normal.   NEUROLOGIC:  He had no focal neurological deficit.   EXTREMITIES:  Showed 1+ pitting edema.      LABORATORY:  CBC showed a hemoglobin of 9.5, hematocrit of 32, WBC 7.8 and a platelet count of 294.  Electrolytes show sodium of 135, potassium 4.4, chloride of 99, bicarbonate of 28, BUN of 25, creatinine 4.49, magnesium of 1.4, phosphorus 1.5, calcium of 8.  His CRP was significantly elevated at 114.  His uric acid was 3.8.      ASSESSMENT AND PLAN:      Mr. Murray Nicholson is a 63-year-old male status post orthotopic heart transplantation in June with a very complex postoperative course who returns today for followup.     1.  Orthotopic heart transplantation.  Mr. Nicholson has normal graft function.  He has not had any evidence of rejection, fortunately, so far.  His biopsies have been negative.  His graft systolic function has been normal.  He is currently on prednisone 5 mg b.i.d. and tacrolimus.  He is off of CellCept secondary to neutropenia.  Tacrolimus goal is 6-8 given his multiple infections.  We will continue to wean the prednisone based on his biopsies.  He is on  aspirin and Crestor for primary prevention of allograft vasculopathy.     2.  Hypertension.  His blood pressure is currently controlled on hydralazine 100 mg 3 times a day, lisinopril 5 mg and Norvasc 10 mg.  I suspect his lower extremity swelling is secondary to Norvasc.     3.  End-stage renal disease.  He continues to remain on dialysis 3 times a week.  He has been tolerating it with no problems.  His dry weight has recently been increased.  With that, he is not having any lightheadedness or dizziness.     4.  Neutropenia.  His white blood cell count has been stable off of CellCept, Bactrim and Valcyte.  We will continue to monitor.  We will consider restarting Bactrim on 10/19 if his white cell count remains stable.  Otherwise, we will use pentamidine.     5.  High risk for CMV.  He is off of Valcyte secondary to neutropenia.  We have been doing weekly CMV checks.  This has been negative.     6.  Oral ulcer with Klebsiella.  He has been evaluated by ENT.  He had a bedside debridement last week.  His CT scan shows no evidence of osteomyelitis at this time and point.  He is currently on empiric ciprofloxacin for 14 days.  This looks like this is slowly getting better.  He is also on the chlorhexidine swish and swallow twice a day.     7.  Perforated bowel, status post colostomy, doing well, tolerating diet, normal ostomy site output.     8.  Low-grade fever with elevated CRP.  He had a temperature of 100.2 last night.  His white blood count is higher than his baseline.  CRP is going up.  All these are concerning for infection.  The source of his infection is unclear to me.  His urinalysis shows some mildly positive leukocyte esterase and nitrites.  His urine culture is pending.  It is possible that he has a urinary tract infection.  However, he is clinically asymptomatic.  Thus, I doubt that he has a urinary tract infection.  He does have this Klebsiella infection of his oral ulcer which could be persistent.  I  have recommended him to have repeat blood cultures drawn to make sure that he does not have bacteremia.  I will repeat his CBC and CRP in 48 hours.  He will continue on ciprofloxacin.  I have recommended him to call us if he has recurrent fever.  If he were to have recurrent fever, I suspect that we need to scan his abdomen as well as his oral cavity.  CMV has been negative so far.     9.  Diabetes.  He is on NPH insulin.  He also uses NovoLog as needed with a sliding scale.  He was not using the sliding scale properly.  I have instructed him to use it properly.      He has not been very clear on his medications. He is meeting with a pharmacist later today.  He does not need to be on Nystatin.  He does not need to be on Eliquis; however, he was erroneously taking this.  I have instructed him to discontinue Eliquis.  He will return to see me next week for a biopsy.  We will have his labs repeated in 24-48 hours.  He will call us if he has any worsening symptoms, in that case will consider scanning him and admitting him to the hospital.      Sincerely,   Klever Ernst MD   [facility]  Heart Failure, Transplant, and Mechanical Circulatory Support Cardiology   Cardiovascular Division  [facility]   [phone]          D: 10/03/2018   T: 10/03/2018   MT: SHELLEY      Name:     SANCHEZ MCNEIL   MRN:      [id number]        Account:      [id number]   :      1955           Service Date: 10/03/2018      Document: [id number]        Klever Ernst MD

## 2018-10-03 NOTE — PROGRESS NOTES
Dear Tres Turcios and Vida,    We had the pleasure of seeing Mr. Murray Vasquez cardiac transplant clinic continues to [facility].  As you know he is a very pleasant 63-year-old gentleman who underwent orthotopic heart transplantation on 6/14/2018.  He is currently listed for kidney transplant.  He returns today for his first follow-up visit.    Mr. Nicholson had an overall uneventful postoperative course following his transplant.  He had a leukocytosis of unclear etiology for which he received 10 days of antibiotics.  Mr. Roper was doing okay up until last week when he was noted to have neutropenia.  His CMV serology was negative.  We decreased his CellCept to 750 mg twice a day, stopped his Bactrim and gave him pentamidine, and stopped his Valcyte as well.  He was getting weekly CMV checks.    Mr. Nicholson has been having fevers on and off for the last 3 days.  He had a sore throat earlier this week which is resolved.  He is complaining of increased saliva very secretions.  He has developed a new ulcer in his oral cavity on the right side of the hard palate.  He has been fatigued and weak.  He has chills.  He is sleeping more than usual.  He fell while returning back home from dialysis.    He denies having cough, shortness of breath, abdominal pain, vomiting, diarrhea or skin rashes.  He has some neck pain.  He has been having headache on and off.  He denies having blurred vision or double vision.  He has no erythema, pain, and discharge from the IV catheter site.    His endomyocardial biopsies have been negative.  His prednisone has been tapered to 15 and 20.    Past medical history    1. S/P OHT  2. Type 2 diabetes mellitus   3. Hyperlipidemia  4. Hypertension  5. Gout  6. End-stage renal disease requiring dialysis on HD    Past surgical history  #1 hernia repair  #2 PD catheter placement    Medications  Current Outpatient Prescriptions   Medication Sig     albuterol (PROAIR HFA/PROVENTIL HFA/VENTOLIN  HFA) 108 (90 Base) MCG/ACT inhaler Inhale 2 puffs into the lungs every 4 hours as needed for shortness of breath / dyspnea or wheezing     amLODIPine (NORVASC) 10 MG tablet Take 1 tablet (10 mg) by mouth daily     ASPIRIN 81 MG OR TABS Take 1 tablet (81 mg) by mouth at bedtime     biotin (BIOTIN 5000) 5 MG CAPS Take 5 mg by mouth daily     blood glucose monitoring (ACCU-CHEK FASTCLIX) lancets Use to test blood sugar 2-3 times daily or as directed.  Ok to substitute alternative if insurance prefers.     blood glucose monitoring (NO BRAND SPECIFIED) test strip Use to test blood sugar 2-3 times daily or as directed.     chlorhexidine (PERIDEX) 0.12 % solution Swish and spit 15 mLs in mouth 2 times daily     ciprofloxacin (CIPRO) 500 MG tablet Take 1 tablet (500 mg) by mouth every 24 hours     clotrimazole 10 MG kalpana Place 1 Kalpana (10 mg) inside cheek 4 times daily     fluticasone (FLONASE) 50 MCG/ACT spray Spray 1-2 sprays into both nostrils daily     glucagon 1 MG SOLR injection Inject 1 mg Subcutaneous every 15 minutes as needed for low blood sugar (May repeat x 1 only)     glucose 40 % (400 mg/mL) GEL gel Take 15-30 g by mouth every 15 minutes as needed for low blood sugar     hydrALAZINE (APRESOLINE) 100 MG TABS tablet Take 1 tablet (100 mg) by mouth every 8 hours     insulin aspart (NOVOLOG PEN) 100 UNIT/ML injection Inject 1-7 Units Subcutaneous 4 times daily (before meals and nightly)     insulin isophane human (HUMULIN N PEN) 100 UNIT/ML injection Inject 16 Units Subcutaneous every morning (before breakfast)     insulin isophane human (HUMULIN N PEN) 100 UNIT/ML injection Inject 12 Units Subcutaneous daily (with dinner)     lisinopril (PRINIVIL/ZESTRIL) 5 MG tablet Take 1 tablet (5 mg) by mouth daily     loratadine (CLARITIN) 10 MG tablet Take 10 mg by mouth daily Reported on 5/3/2017     melatonin 1 MG TABS tablet Take 2 tablets (2 mg) by mouth nightly as needed for sleep     NEPHROCAPS 1 MG capsule Take 1  "capsule by mouth daily     order for DME Equipment being ordered: Carol ()  Treatment Diagnosis: ESRD on PD  Pt has to be connected to PD all night and can not be disconnected, hence impending his mobility to go to the bathroom. At risk for infection if he does not have this equipment.     pantoprazole (PROTONIX) 40 MG EC tablet Take 1 tablet (40 mg) by mouth 2 times daily (before meals)     pentamidine (NEBUPENT) 300 MG neb solution Inhale 300 mg into the lungs every 28 days Last given 9/19/18     polyethylene glycol (MIRALAX/GLYCOLAX) Packet Take 17 g by mouth daily as needed for constipation     potassium chloride SA (KLOR-CON) 20 MEQ CR tablet Take 1 tablet (20 mEq) by mouth daily as needed for potassium supplementation     predniSONE (DELTASONE) 5 MG tablet Take 1 tablet (5 mg) by mouth 2 times daily (with meals)     rosuvastatin (CRESTOR) 20 MG tablet Take 1 tablet (20 mg) by mouth daily     simethicone (MYLICON) 80 MG chewable tablet Take 1 tablet (80 mg) by mouth every 6 hours as needed for cramping     tacrolimus (GENERIC EQUIVALENT) 1 MG capsule Take 1 capsule (1 mg) by mouth 2 times daily     traMADol (ULTRAM) 50 MG tablet Take 1 tablet (50 mg) by mouth every 12 hours as needed for moderate pain     triamcinolone (KENALOG) 0.1 % ointment Apply topically 2 times daily     Urea 40 % CREA Externally apply topically daily     No current facility-administered medications for this visit.      Review of system:  A detailed 10 point review of system obtained as described in history of present illness all other systems reviewed and are negative      Examination:  He was tachypneic.  He was having chills.  /74 (BP Location: Left arm, Patient Position: Chair, Cuff Size: Adult Regular)  Pulse 95  Ht 1.753 m (5' 9\")  Wt 77.7 kg (171 lb 6.4 oz)  SpO2 100%  BMI 25.31 kg/m2. He had no jugular venous distention.  He had no paler cyanosis or jaundice.  His carotids were 1+ bilaterally.  His pulse was " regular rate and rhythm.  Cardiac auscultation revealed a normal S1 soft S2 and an early diastolic murmur in the aortic area.   Auscultation of his lungs reveal equal air entry on both sides with no added sounds.  His abdomen was soft with normal bowel sounds no tenderness no rigidity no guarding. He had no focal neurological deficit.  His extremities showed no edema.  He had 5 x 3 cm ulcerative lesion on the right side of his upper palate with necrotic base concerning for necrotizing infection.    CBC RESULTS:   Recent Labs   Lab Test  07/23/18   0914   WBC  1.5*   RBC  2.29*   HGB  7.0*   HCT  21.2*   MCV  93   MCH  30.6   MCHC  33.0   RDW  21.0*   PLT  216       Recent Labs   Lab Test  07/26/18   1732  07/23/18   0914   NA  129*  130*   POTASSIUM  4.4  5.3   CHLORIDE  94  99   CO2  26  18*   ANIONGAP  9  12   GLC  158*  285*   BUN  22  68*   CR  2.96*  7.09*   TRINI  8.4*  8.6       FK level - 8.6    CMV - Negative    Echo: Global and regional left ventricular function is hyperkinetic with an EF >70%.  Mild-moderate left ventricular hypertrophy.  Right ventricle with normal size and systolic function with mild hypertrophy.  No significant valve disease and no change in LVEF.    EKG: Sinus tachycardia    Assessment and Plan:    Mr. Nicholson is a very pleasant 63-year-old gentleman S/P OHT who returns today for follow up.     Impression:   1. Febrile neutropenia with oral mucosal ulcer  2. Concern for sepsis in the setting of neutropenia, immunosuppression, and dialysis catheter.     Plan:  1. Will send him to ER for admission  2. IV fluids  3. Pan culture and chest x-ray  3. IV empiric antibiotics - Vancomycin and zosyn - renally dose adjusted. Transplant ID consult  4. Hematology consult - will need to give GM-CSF  5. ENT consult to evaluate the oral ulcers.   6. Will continue FK and consider decreasing cellcept dose further 250 mg bid  7. Immuknow as an inpatient  8. Will continue to hold bactrim and valcyte for  now.       Sincerely,  Klever Ernst MD   [facility]  Heart Failure, Transplant, and Mechanical Circulatory Support Cardiology   Cardiovascular Division  [facility]   [phone]

## 2018-10-03 NOTE — TELEPHONE ENCOUNTER
Yes I talked to him yesterday and called the COB (Coordination of Benefits) for Medicare & because of ESRD Medicare they talked to me.  Explained [employer] ([employer] [facility]) Murray never took Cobra & has a C[employer] Supplement. Updated the file showing Medicare primary & could talk 24 hrs to 10 days to update.Murray was in dialysis & then talked to him again after, should be fine now. [employer] is his part D RX coverage. Immuno s would go through Medicare part B.  Thanks,      Patt Victor  [facility]

## 2018-10-03 NOTE — PATIENT INSTRUCTIONS
Please call your coordinator at [phone] with any questions or concerns.      Labs on Friday 8:15 am with Prograf level (12-hour trough). Will try to arrange for you to see Pharmacist after appt.     Discontinue Elliquis today.    Discontinue Nystatin, cont josefina.    Call with any fevers.    Follow sliding scale insulin (Novolog) as directed on discharge summary.

## 2018-10-03 NOTE — NURSING NOTE
Chief Complaint   Patient presents with     Follow Up For     New Heart TX ( Month 4) 16 weeks      Vitals were taken and medications were reconciled.    Darya Moulton RMA  2:29 PM

## 2018-10-03 NOTE — NURSING NOTE
Chief Complaint   Patient presents with     Follow Up For     New Heart TX ( Month 4) 16 weeks      Vitals were taken and medications were reconciled.     Darya Moulton RMA  2:28 PM

## 2018-10-03 NOTE — MR AVS SNAPSHOT
After Visit Summary   10/3/2018    Murray Nicholson    MRN: [id number]           Patient Information     Date Of Birth          1955        Visit Information        Provider Department      10/3/2018 2:00 PM Klever Ernst MD [facility]        Care Instructions    Please call your coordinator at [phone] with any questions or concerns.      Labs on Friday 8:15 am with Prograf level (12-hour trough). Will try to arrange for you to see Pharmacist after appt.     Discontinue Elliquis today.    Discontinue Nystatin, cont josefina.    Call with any fevers.    Follow sliding scale insulin (Novolog) as directed on discharge summary.               Follow-ups after your visit        Your next 10 appointments already scheduled     Oct 05, 2018  8:15 AM CDT   LAB with  LAB   [facility] ([facility])    [address] [address] [phone]           Please do not eat 10-12 hours before your appointment if you are coming in fasting for labs on lipids, cholesterol, or glucose (sugar). This does not apply to pregnant women. Water, hot tea and black coffee (with nothing added) are okay. Do not drink other fluids, diet soda or chew gum.            Oct 10, 2018  1:00 PM CDT   LAB with UINGA LAB GOLD WAITING   [facility] ([facility])    [address] [facility] [address]              Please do not eat 10-12 hours before your appointment if you are coming in fasting for labs on lipids, cholesterol, or glucose (sugar). This does not apply to pregnant women. Water, hot tea and black coffee (with nothing added) are okay. Do not drink other fluids, diet soda or chew gum.            Oct 10, 2018  1:30 PM CDT   Procedure - 2.5 hour with U2A ROOM 17   [address] [facility] ([facility])    [address] [facility] [address]               Oct  10, 2018  2:30 PM CDT   Heart Cath Heart Biopsy with [id number]   [facility] [place]  Heart Cath Lab ([facility])    [address] [facility] [address]   [phone]            Oct 12, 2018  7:30 AM CDT   LAB with  LAB   [facility] ([facility])    [address]  [address] [facility] [id number] [phone]           Please do not eat 10-12 hours before your appointment if you are coming in fasting for labs on lipids, cholesterol, or glucose (sugar). This does not apply to pregnant women. Water, hot tea and black coffee (with nothing added) are okay. Do not drink other fluids, diet soda or chew gum.            Oct 12, 2018  8:30 AM CDT   (Arrive by 8:15 AM)   Office Visit with Eduardo Lea [facility] ([facility])    [address]  [address] [facility] [address] [phone]           Bring a current list of meds and any records pertaining to this visit. For Physicals, please bring immunization records and any forms needing to be filled out. Please arrive 10 minutes early to complete paperwork.            Oct 15, 2018  9:30 AM CDT   (Arrive by 9:15 AM)   New Patient Visit with Rick Tran MD   [facility] ([facility])    [address] [place] [address]  [address] [facility] [address]   [phone]            Oct 19, 2018  9:00 AM CDT   LAB with  LAB   [facility] ([facility])    [address] [place] [address]  [address] [facility] [address] [phone]           Please do not eat 10-12 hours before your appointment if you are coming in fasting for labs on lipids, cholesterol, or glucose (sugar). This does not apply to pregnant women. Water, hot tea and black coffee (with nothing added) are okay. Do not drink other fluids, diet soda or chew gum.            Oct 26, 2018   7:30 AM CDT   LAB with  LAB   [facility] ([facility])    [address]  [address]  [facility] [address] [phone]           Please do not eat 10-12 hours before your appointment if you are coming in fasting for labs on lipids, cholesterol, or glucose (sugar). This does not apply to pregnant women. Water, hot tea and black coffee (with nothing added) are okay. Do not drink other fluids, diet soda or chew gum.            Oct 31, 2018  3:30 PM CDT   (Arrive by 3:15 PM)   Return Visit with Vinayak Lawrence MD   [facility] ([facility])    [address] [facility]  [address]  [facility] [address] [phone]              Who to contact     If you have questions or need follow up information about today's clinic visit or your schedule please contact [facility] directly at [phone].  Normal or non-critical lab and imaging results will be communicated to you by Kaybust, letter or phone within 4 business days after the clinic has received the results. If you do not hear from us within 7 days, please contact the clinic through [employer] or phone. If you have a critical or abnormal lab result, we will notify you by phone as soon as possible.  Submit refill requests through [employer] or call your pharmacy and they will forward the refill request to us. Please allow 3 business days for your refill to be completed.          Additional Information About Your Visit        [employer] Information     [employer] gives you secure access to your electronic health record. If you see a primary care provider, you can also send messages to your care team and make appointments. If you have questions, please call your primary care clinic.  If you do not have a primary care provider, please call [phone] and they will assist you.        Care EveryWhere ID     This is your Care EveryWhere ID. This could be used by other  "organizations to access your [facility] medical records  [id number]        Your Vitals Were     Pulse Height Pulse Oximetry BMI (Body Mass Index)          95 1.753 m (5' 9\") 100% 25.31 kg/m2         Blood Pressure from Last 3 Encounters:   10/03/18 108/74   09/26/18 126/78   09/11/18 (!) 152/93    Weight from Last 3 Encounters:   10/03/18 77.7 kg (171 lb 6.4 oz)   09/26/18 75.3 kg (165 lb 14.4 oz)   09/11/18 80 kg (176 lb 6.4 oz)              Today, you had the following     No orders found for display         Today's Medication Changes          These changes are accurate as of 10/3/18  3:59 PM.  If you have any questions, ask your nurse or doctor.               Stop taking these medicines if you haven't already. Please contact your care team if you have questions.     nystatin 923005 UNIT/ML suspension   Commonly known as:  MYCOSTATIN   Stopped by:  Klever Ernst MD                    Primary Care Provider Office Phone # Fax #    Yahir Alex Turcios [phone]       [address]  [facility] [address]        Goals        Lifestyle    Increase physical activity     Notes - Note created  7/3/2018  1:51 PM by Carrie Tran, RN    Goal Statement: I will start cardiac rehab  Measure of Success: starts cardiac rehab  Supportive Steps to Achieve: support of RN CC  Barriers: none  Strengths: willingness to participate   Date to Achieve By: 7-15-18          Equal Access to Services     PRISCA HAUSER AH: Hadii aad ku hadasho Soomaali, waaxda luqadaha, qaybta kaalmada adeegyada, waxlisette idiin hayaan adeeg kharash la'aan . So [facility] [phone].    ATENCIÓN: Si habla español, tiene a ortiz disposición servicios gratuitos de asistencia lingüística. Llame al [phone].    We comply with applicable federal civil rights laws and [place] laws. We do not discriminate on the basis of race, color, national origin, age, disability, sex, sexual orientation, or gender identity.            Thank you!     Thank you " for choosing [facility]  for your care. Our goal is always to provide you with excellent care. Hearing back from our patients is one way we can continue to improve our services. Please take a few minutes to complete the written survey that you may receive in the mail after your visit with us. Thank you!             Your Updated Medication List - Protect others around you: Learn how to safely use, store and throw away your medicines at www.disposemymeds.org.          This list is accurate as of 10/3/18  3:59 PM.  Always use your most recent med list.                   Brand Name Dispense Instructions for use Diagnosis    albuterol 108 (90 Base) MCG/ACT inhaler    PROAIR HFA/PROVENTIL HFA/VENTOLIN HFA     Inhale 2 puffs into the lungs every 4 hours as needed for shortness of breath / dyspnea or wheezing        amLODIPine 10 MG tablet    NORVASC    30 tablet    Take 1 tablet (10 mg) by mouth daily    Hypertension goal BP (blood pressure) < 130/80       aspirin 81 MG tablet      Take 1 tablet (81 mg) by mouth at bedtime        biotin 5 MG Caps    BIOTIN 5000    30 capsule    Take 5 mg by mouth daily    Brittle nails       blood glucose monitoring lancets     102 each    Use to test blood sugar 2-3 times daily or as directed.  Ok to substitute alternative if insurance prefers.    Type 2 diabetes mellitus with stage 5 chronic kidney disease not on chronic dialysis, without long-term current use of insulin (H)       blood glucose monitoring test strip    no brand specified    100 each    Use to test blood sugar 2-3 times daily or as directed.    Type 2 diabetes mellitus with stage 5 chronic kidney disease not on chronic dialysis, without long-term current use of insulin (H)       chlorhexidine 0.12 % solution    PERIDEX    900 mL    Swish and spit 15 mLs in mouth 2 times daily    Oral ulceration       ciprofloxacin 500 MG tablet    CIPRO    14 tablet    Take 1 tablet (500 mg) by mouth every 24 hours    Oral  ulceration       clotrimazole 10 MG kalpana     40 Kalpana    Place 1 Kalpana (10 mg) inside cheek 4 times daily    Candidiasis of mouth       fluticasone 50 MCG/ACT spray    FLONASE    16 g    Spray 1-2 sprays into both nostrils daily    Nasal congestion       glucagon 1 MG Solr injection      Inject 1 mg Subcutaneous every 15 minutes as needed for low blood sugar (May repeat x 1 only)    Hypoglycemia       glucose 40 % (400 mg/mL) Gel gel      Take 15-30 g by mouth every 15 minutes as needed for low blood sugar    Hypoglycemia       hydrALAZINE 100 MG Tabs tablet    APRESOLINE    60 tablet    Take 1 tablet (100 mg) by mouth every 8 hours    Essential hypertension       insulin aspart 100 UNIT/ML injection    NovoLOG PEN    3 mL    Inject 1-7 Units Subcutaneous 4 times daily (before meals and nightly)    Type 2 diabetes mellitus with diabetic nephropathy, with long-term current use of insulin (H)       * insulin isophane human 100 UNIT/ML injection    HumuLIN N PEN     Inject 12 Units Subcutaneous daily (with dinner)    Type 2 diabetes mellitus with diabetic nephropathy, with long-term current use of insulin (H)       * insulin isophane human 100 UNIT/ML injection    HumuLIN N PEN     Inject 16 Units Subcutaneous every morning (before breakfast)    Type 2 diabetes mellitus with diabetic nephropathy, with long-term current use of insulin (H)       lisinopril 5 MG tablet    PRINIVIL/ZESTRIL    30 tablet    Take 1 tablet (5 mg) by mouth daily    Hypertension goal BP (blood pressure) < 130/80       loratadine 10 MG tablet    CLARITIN     Take 10 mg by mouth daily Reported on 5/3/2017    Hypertensive cardiopathy, SOB (shortness of breath)       melatonin 1 MG Tabs tablet     120 tablet    Take 2 tablets (2 mg) by mouth nightly as needed for sleep    Heart transplanted (H)       NEPHROCAPS 1 MG capsule     120 capsule    Take 1 capsule by mouth daily        order for DME     1 Units    Equipment being ordered: Carol  () Treatment Diagnosis: ESRD on PD Pt has to be connected to PD all night and can not be disconnected, hence impending his mobility to go to the bathroom. At risk for infection if he does not have this equipment.    CKD (chronic kidney disease) stage 5, GFR less than 15 ml/min (H)       pantoprazole 40 MG EC tablet    PROTONIX    60 tablet    Take 1 tablet (40 mg) by mouth 2 times daily (before meals)    Duodenitis       pentamidine 300 MG neb solution    NEBUPENT    300 mg    Inhale 300 mg into the lungs every 28 days Last given 9/19/18    Heart replaced by transplant (H)       polyethylene glycol Packet    MIRALAX/GLYCOLAX    7 packet    Take 17 g by mouth daily as needed for constipation    Constipation, unspecified constipation type       potassium chloride SA 20 MEQ CR tablet    KLOR-CON    3 tablet    Take 1 tablet (20 mEq) by mouth daily as needed for potassium supplementation    Heart replaced by transplant (H)       predniSONE 5 MG tablet    DELTASONE    60 tablet    Take 1 tablet (5 mg) by mouth 2 times daily (with meals)    Heart transplanted (H)       rosuvastatin 20 MG tablet    CRESTOR    30 tablet    Take 1 tablet (20 mg) by mouth daily    Heart transplant recipient (H)       simethicone 80 MG chewable tablet    MYLICON    180 tablet    Take 1 tablet (80 mg) by mouth every 6 hours as needed for cramping    Flatulence, eructation and gas pain       tacrolimus 1 MG capsule    GENERIC EQUIVALENT    60 capsule    Take 1 capsule (1 mg) by mouth 2 times daily    Heart transplanted (H)       traMADol 50 MG tablet    ULTRAM    10 tablet    Take 1 tablet (50 mg) by mouth every 12 hours as needed for moderate pain    Moderate pain       triamcinolone 0.1 % ointment    KENALOG    80 g    Apply topically 2 times daily    Xerosis of skin       Urea 40 % Crea     85 g    Externally apply topically daily    Brittle nails       * Notice:  This list has 2 medication(s) that are the same as other medications  prescribed for you. Read the directions carefully, and ask your doctor or other care provider to review them with you.

## 2018-10-03 NOTE — LETTER
10/3/2018      RE: Murray Nicholson  [address]  [place] [address]       Dear Colleague,    Thank you for the opportunity to participate in the care of your patient, Murray Nicholson, at the [facility] at [facility]. Please see a copy of my visit note below.    Dear Tres Turcios and Vida,    We had the pleasure of seeing Mr. Murray Vasquez cardiac transplant clinic continues to [facility].  As you know he is a very pleasant 63-year-old gentleman who underwent orthotopic heart transplantation on 6/14/2018.  He is currently listed for kidney transplant.  He returns today for his first follow-up visit.    Mr. Nicholson had an overall uneventful postoperative course following his transplant.  He had a leukocytosis of unclear etiology for which he received 10 days of antibiotics.  Mr. Roper was doing okay up until last week when he was noted to have neutropenia.  His CMV serology was negative.  We decreased his CellCept to 750 mg twice a day, stopped his Bactrim and gave him pentamidine, and stopped his Valcyte as well.  He was getting weekly CMV checks.    Mr. Nicholson has been having fevers on and off for the last 3 days.  He had a sore throat earlier this week which is resolved.  He is complaining of increased saliva very secretions.  He has developed a new ulcer in his oral cavity on the right side of the hard palate.  He has been fatigued and weak.  He has chills.  He is sleeping more than usual.  He fell while returning back home from dialysis.    He denies having cough, shortness of breath, abdominal pain, vomiting, diarrhea or skin rashes.  He has some neck pain.  He has been having headache on and off.  He denies having blurred vision or double vision.  He has no erythema, pain, and discharge from the IV catheter site.    His endomyocardial biopsies have been negative.  His prednisone has been tapered to 15 and 20.    Past medical  history    1. S/P OHT  2. Type 2 diabetes mellitus   3. Hyperlipidemia  4. Hypertension  5. Gout  6. End-stage renal disease requiring dialysis on HD    Past surgical history  #1 hernia repair  #2 PD catheter placement    Medications  Current Outpatient Prescriptions   Medication Sig     albuterol (PROAIR HFA/PROVENTIL HFA/VENTOLIN HFA) 108 (90 Base) MCG/ACT inhaler Inhale 2 puffs into the lungs every 4 hours as needed for shortness of breath / dyspnea or wheezing     amLODIPine (NORVASC) 10 MG tablet Take 1 tablet (10 mg) by mouth daily     ASPIRIN 81 MG OR TABS Take 1 tablet (81 mg) by mouth at bedtime     biotin (BIOTIN 5000) 5 MG CAPS Take 5 mg by mouth daily     blood glucose monitoring (ACCU-CHEK FASTCLIX) lancets Use to test blood sugar 2-3 times daily or as directed.  Ok to substitute alternative if insurance prefers.     blood glucose monitoring (NO BRAND SPECIFIED) test strip Use to test blood sugar 2-3 times daily or as directed.     chlorhexidine (PERIDEX) 0.12 % solution Swish and spit 15 mLs in mouth 2 times daily     ciprofloxacin (CIPRO) 500 MG tablet Take 1 tablet (500 mg) by mouth every 24 hours     clotrimazole 10 MG kalpana Place 1 Kalpana (10 mg) inside cheek 4 times daily     fluticasone (FLONASE) 50 MCG/ACT spray Spray 1-2 sprays into both nostrils daily     glucagon 1 MG SOLR injection Inject 1 mg Subcutaneous every 15 minutes as needed for low blood sugar (May repeat x 1 only)     glucose 40 % (400 mg/mL) GEL gel Take 15-30 g by mouth every 15 minutes as needed for low blood sugar     hydrALAZINE (APRESOLINE) 100 MG TABS tablet Take 1 tablet (100 mg) by mouth every 8 hours     insulin aspart (NOVOLOG PEN) 100 UNIT/ML injection Inject 1-7 Units Subcutaneous 4 times daily (before meals and nightly)     insulin isophane human (HUMULIN N PEN) 100 UNIT/ML injection Inject 16 Units Subcutaneous every morning (before breakfast)     insulin isophane human (HUMULIN N PEN) 100 UNIT/ML injection Inject  12 Units Subcutaneous daily (with dinner)     lisinopril (PRINIVIL/ZESTRIL) 5 MG tablet Take 1 tablet (5 mg) by mouth daily     loratadine (CLARITIN) 10 MG tablet Take 10 mg by mouth daily Reported on 5/3/2017     melatonin 1 MG TABS tablet Take 2 tablets (2 mg) by mouth nightly as needed for sleep     NEPHROCAPS 1 MG capsule Take 1 capsule by mouth daily     order for DME Equipment being ordered: Carol ()  Treatment Diagnosis: ESRD on PD  Pt has to be connected to PD all night and can not be disconnected, hence impending his mobility to go to the bathroom. At risk for infection if he does not have this equipment.     pantoprazole (PROTONIX) 40 MG EC tablet Take 1 tablet (40 mg) by mouth 2 times daily (before meals)     pentamidine (NEBUPENT) 300 MG neb solution Inhale 300 mg into the lungs every 28 days Last given 9/19/18     polyethylene glycol (MIRALAX/GLYCOLAX) Packet Take 17 g by mouth daily as needed for constipation     potassium chloride SA (KLOR-CON) 20 MEQ CR tablet Take 1 tablet (20 mEq) by mouth daily as needed for potassium supplementation     predniSONE (DELTASONE) 5 MG tablet Take 1 tablet (5 mg) by mouth 2 times daily (with meals)     rosuvastatin (CRESTOR) 20 MG tablet Take 1 tablet (20 mg) by mouth daily     simethicone (MYLICON) 80 MG chewable tablet Take 1 tablet (80 mg) by mouth every 6 hours as needed for cramping     tacrolimus (GENERIC EQUIVALENT) 1 MG capsule Take 1 capsule (1 mg) by mouth 2 times daily     traMADol (ULTRAM) 50 MG tablet Take 1 tablet (50 mg) by mouth every 12 hours as needed for moderate pain     triamcinolone (KENALOG) 0.1 % ointment Apply topically 2 times daily     Urea 40 % CREA Externally apply topically daily     No current facility-administered medications for this visit.      Review of system:  A detailed 10 point review of system obtained as described in history of present illness all other systems reviewed and are negative      Examination:  He was  "tachypneic.  He was having chills.  /74 (BP Location: Left arm, Patient Position: Chair, Cuff Size: Adult Regular)  Pulse 95  Ht 1.753 m (5' 9\")  Wt 77.7 kg (171 lb 6.4 oz)  SpO2 100%  BMI 25.31 kg/m2. He had no jugular venous distention.  He had no paler cyanosis or jaundice.  His carotids were 1+ bilaterally.  His pulse was regular rate and rhythm.  Cardiac auscultation revealed a normal S1 soft S2 and an early diastolic murmur in the aortic area.   Auscultation of his lungs reveal equal air entry on both sides with no added sounds.  His abdomen was soft with normal bowel sounds no tenderness no rigidity no guarding. He had no focal neurological deficit.  His extremities showed no edema.  He had 5 x 3 cm ulcerative lesion on the right side of his upper palate with necrotic base concerning for necrotizing infection.    CBC RESULTS:   Recent Labs   Lab Test  07/23/18   0914   WBC  1.5*   RBC  2.29*   HGB  7.0*   HCT  21.2*   MCV  93   MCH  30.6   MCHC  33.0   RDW  21.0*   PLT  216       Recent Labs   Lab Test  07/26/18   1732  07/23/18   0914   NA  129*  130*   POTASSIUM  4.4  5.3   CHLORIDE  94  99   CO2  26  18*   ANIONGAP  9  12   GLC  158*  285*   BUN  22  68*   CR  2.96*  7.09*   TRINI  8.4*  8.6       FK level - 8.6    CMV - Negative    Echo: Global and regional left ventricular function is hyperkinetic with an EF >70%.  Mild-moderate left ventricular hypertrophy.  Right ventricle with normal size and systolic function with mild hypertrophy.  No significant valve disease and no change in LVEF.    EKG: Sinus tachycardia    Assessment and Plan:    Mr. Nicholson is a very pleasant 63-year-old gentleman S/P OHT who returns today for follow up.     Impression:   1. Febrile neutropenia with oral mucosal ulcer  2. Concern for sepsis in the setting of neutropenia, immunosuppression, and dialysis catheter.     Plan:  1. Will send him to ER for admission  2. IV fluids  3. Pan culture and chest x-ray  3. IV " empiric antibiotics - Vancomycin and zosyn - renally dose adjusted. Transplant ID consult  4. Hematology consult - will need to give GM-CSF  5. ENT consult to evaluate the oral ulcers.   6. Will continue FK and consider decreasing cellcept dose further 250 mg bid  7. Immuknow as an inpatient  8. Will continue to hold bactrim and valcyte for now.       Sincerely,  Klever Ernst MD   [facility]  Heart Failure, Transplant, and Mechanical Circulatory Support Cardiology   Cardiovascular Division  [facility]   [phone]          Service Date: 10/03/2018      October 3, 2018      Yahir Turcios MD    [facility]    [address] [place] [address]       Ana Luisa Mcpherson MD    [facility]    [address] [place] [address], [facility] [address]    [place]  [address]       RE: Murray Nicholson   MRN: [id number]   : 1955      Dear Dr. Turcios and Dr. Mcpherson:      We had the pleasure of seeing Mr. Murray Nicholson for followup in our Cardiac Transplant Clinic at the [facility].  As you know, he is a very pleasant 63-year-old male who underwent orthotopic heart transplantation on 2018.  He is currently listed for kidney transplantation.  He had a very complex postoperative course.  His current problem list includes:     1.  Status post orthotopic heart transplantation.   2.  Neutropenia.   3.  Oral mucosal ulcer secondary to neutropenia with Klebsiella infection.   4.  Pseudomonas bacteremia, resolved.   5.  Perforation of the small bowel, status post small-bowel resection and ileostomy.   6.  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   7.  Hypertension.   8.  Hyperlipidemia.   9.  End-stage renal disease requiring hemodialysis.      Mr. Nicholson was recently discharged from the TCU.  He had a very prolonged hospitalization during the month of August for a perforated bowel.  He had to undergo emergency small bowel resection  with ileostomy.  Postoperative course was complicated by bowel obstruction, which gradually resolved.  He also had an oral ulcer which grew Klebsiella.  He did not have Klebsiella bacteremia.  He was treated with p.o. ciprofloxacin.  He has had significant neutropenia with CellCept.  In light of this, he has been off of CellCept and he is just on prednisone and tacrolimus.  He is off Valcyte secondary to his neutropenia.  He is a high risk for CMV.  We have been checking weekly CMV titers.  He is off of Bactrim secondary to neutropenia.  He is on pentamidine.      Mr. Nicholson has been home for the last week.  Overall, he has been feeling okay.  He lives alone.  He is able to drive to appointments and to dialysis by himself.  He is able to take care of himself. He had 1 low-grade fever last night - 100.2.  He denies having chills.  A complete review of systems was obtained and was unremarkable.  He has no chest pain, no shortness of breath.  He has no chills.  He has minimal upper respiratory tract symptoms.  He had some runny nose but it has been there constant for the last 4 weeks.  He has no headache.  No visual changes.  No problems with his swallowing.  No nausea or vomiting.  He denies having cough.  He has no chest pain.  He has no abdominal pain.  His ileostomy output is stable.  He has not noticed any blood.  He has no pain when he passes urine.  He still makes some urine.  He has no rashes.  His appetite has been overall okay.      MEDICATIONS:   1.  Prednisone 5 mg b.i.d.   2.  Tacrolimus 1 b.i.d.   3.  Aspirin 81 mg daily.   4.  Hydralazine 100 mg 3 times a day.   5.  Lisinopril 5 mg daily.   6.  Norvasc 10 mg daily.   7.  Clotrimazole troches 4 times daily.   8.  Ciprofloxacin 500 mg daily for 14 days.   9.  Chlorhexidine swish and swallow twice daily.   10.  Crestor 20 mg daily.   11.  NPH insulin 12 units in the morning and 16 units in the evening.   12.  NovoLog insulin sliding scale.   13.  Biotin 5  mg daily.   12.  Protonix 40 mg twice a day.      REVIEW OF SYSTEMS:  A detailed 10-point review of systems was obtained as described in History of Present Illness.  All other systems are reviewed and are negative.      PHYSICAL EXAMINATION:   GENERAL:  He was comfortable.  He was in no apparent distress.   VITAL SIGNS:  Temperature 98.2, blood pressure 108/74, pulse rate 95, respiratory rate 16.  He was weight was 77.7 kilograms.   HEENT:  No pallor, cyanosis, or jaundice.  Examination of the oral cavity revealed an ulcerative lesion on the upper palate covered with unhealthy granulation tissue, albeit this looks better when compared to last week.  Otherwise, oral cavity examination is normal.   NECK:  Neck exam reveals no jugular venous distention.  Carotids were 2+.  No lymphadenopathy.   LUNGS:  Clear to auscultation bilaterally.   CARDIAC:  Cardiac auscultation revealed normal S1, S2 with no murmur, rub or gallop.   LUNGS:  Auscultation of the lungs revealed equal air entry on both.     ABDOMEN:  Abdomen was soft with normal bowel sounds.  Ostomy site looks normal.   SKIN:  Skin exam is normal.   NEUROLOGIC:  He had no focal neurological deficit.   EXTREMITIES:  Showed 1+ pitting edema.      LABORATORY:  CBC showed a hemoglobin of 9.5, hematocrit of 32, WBC 7.8 and a platelet count of 294.  Electrolytes show sodium of 135, potassium 4.4, chloride of 99, bicarbonate of 28, BUN of 25, creatinine 4.49, magnesium of 1.4, phosphorus 1.5, calcium of 8.  His CRP was significantly elevated at 114.  His uric acid was 3.8.      ASSESSMENT AND PLAN:      Mr. Murray Nicholson is a 63-year-old male status post orthotopic heart transplantation in June with a very complex postoperative course who returns today for followup.     1.  Orthotopic heart transplantation.  Mr. Nicholson has normal graft function.  He has not had any evidence of rejection, fortunately, so far.  His biopsies have been negative.  His graft systolic function  has been normal.  He is currently on prednisone 5 mg b.i.d. and tacrolimus.  He is off of CellCept secondary to neutropenia.  Tacrolimus goal is 6-8 given his multiple infections.  We will continue to wean the prednisone based on his biopsies.  He is on aspirin and Crestor for primary prevention of allograft vasculopathy.     2.  Hypertension.  His blood pressure is currently controlled on hydralazine 100 mg 3 times a day, lisinopril 5 mg and Norvasc 10 mg.  I suspect his lower extremity swelling is secondary to Norvasc.     3.  End-stage renal disease.  He continues to remain on dialysis 3 times a week.  He has been tolerating it with no problems.  His dry weight has recently been increased.  With that, he is not having any lightheadedness or dizziness.     4.  Neutropenia.  His white blood cell count has been stable off of CellCept, Bactrim and Valcyte.  We will continue to monitor.  We will consider restarting Bactrim on 10/19 if his white cell count remains stable.  Otherwise, we will use pentamidine.     5.  High risk for CMV.  He is off of Valcyte secondary to neutropenia.  We have been doing weekly CMV checks.  This has been negative.     6.  Oral ulcer with Klebsiella.  He has been evaluated by ENT.  He had a bedside debridement last week.  His CT scan shows no evidence of osteomyelitis at this time and point.  He is currently on empiric ciprofloxacin for 14 days.  This looks like this is slowly getting better.  He is also on the chlorhexidine swish and swallow twice a day.     7.  Perforated bowel, status post colostomy, doing well, tolerating diet, normal ostomy site output.     8.  Low-grade fever with elevated CRP.  He had a temperature of 100.2 last night.  His white blood count is higher than his baseline.  CRP is going up.  All these are concerning for infection.  The source of his infection is unclear to me.  His urinalysis shows some mildly positive leukocyte esterase and nitrites.  His urine  culture is pending.  It is possible that he has a urinary tract infection.  However, he is clinically asymptomatic.  Thus, I doubt that he has a urinary tract infection.  He does have this Klebsiella infection of his oral ulcer which could be persistent.  I have recommended him to have repeat blood cultures drawn to make sure that he does not have bacteremia.  I will repeat his CBC and CRP in 48 hours.  He will continue on ciprofloxacin.  I have recommended him to call us if he has recurrent fever.  If he were to have recurrent fever, I suspect that we need to scan his abdomen as well as his oral cavity.  CMV has been negative so far.     9.  Diabetes.  He is on NPH insulin.  He also uses NovoLog as needed with a sliding scale.  He was not using the sliding scale properly.  I have instructed him to use it properly.      He has not been very clear on his medications. He is meeting with a pharmacist later today.  He does not need to be on Nystatin.  He does not need to be on Eliquis; however, he was erroneously taking this.  I have instructed him to discontinue Eliquis.  He will return to see me next week for a biopsy.  We will have his labs repeated in 24-48 hours.  He will call us if he has any worsening symptoms, in that case will consider scanning him and admitting him to the hospital.      Sincerely,   Klever Ernst MD   [facility]  Heart Failure, Transplant, and Mechanical Circulatory Support Cardiology   Cardiovascular Division  [facility]   [phone]          D: 10/03/2018   T: 10/03/2018   MT: SHELLEY      Name:     SANCHEZ MCNEIL   MRN:      [id number]        Account:      [id number]   :      1955           Service Date: 10/03/2018      Document: [id number]

## 2018-10-04 DIAGNOSIS — Z94.1 HEART REPLACED BY TRANSPLANT (H): Primary | ICD-10-CM

## 2018-10-04 DIAGNOSIS — E11.9 DIABETES (H): ICD-10-CM

## 2018-10-04 LAB
BACTERIA SPEC CULT: NORMAL
BACTERIA SPEC CULT: NORMAL
CMV DNA SPEC NAA+PROBE-ACNC: NORMAL [IU]/ML
CMV DNA SPEC NAA+PROBE-LOG#: NORMAL {LOG_IU}/ML
PRA SINGLE ANTIGEN IGG ANTIBODY: NORMAL
SPECIMEN SOURCE: NORMAL
SPECIMEN SOURCE: NORMAL

## 2018-10-04 RX ORDER — LIDOCAINE 40 MG/G
CREAM TOPICAL
Status: CANCELLED | OUTPATIENT
Start: 2018-10-04

## 2018-10-04 NOTE — NURSING NOTE
Transplant Coordinator Note  Reason for visit: Follow up post hospitalization (~3.5 months post tx)  Coordinator: Present Lillie Ulloa  Caregiver: Lives alone.Pt states he speaks with his best friend most days, family had his apartment cleaned while he was inpt. Sons available if needed.     Health concerns addressed today:  1. Fever 100.2 last evening, resolved by AM - enc to call if fevers resume. WBC 7.8, ; recheck on 10/5 with blood cultures.   2. Feels his mouth is healing, able to eat, drink without pain. Med list reviewed - instructed to stop Nystatin and cont Troches   3. Lower back pain last few days, using a topical which seems to help  4. Some swelling in lower legs; occas SBP at home >140. In clinic  ; cont to monitor   5. MD reviewed med list from discharge summary. Pt reports that he is not taking short acting insulin wasn't sure how it all worked. MD reviewed process in detail. Pt stated he resumed Elliquis when he got home - instructed to discontinue.  Will have him meet with the pharmacist as soon as available.     Immunosuppressants:  Pred 5mg BID (cont through next NER biopsies)  FK 1 mg BID (goal 6-8). Recheck 10/5/18  MMF on hold due to low WBC     Preventive Cares:    CMV - off valctye due to low WBC - weekly CMV checks to month 6 (CMV mismatch)  Bactrim on hold due to low WBC -  Last Pentamidine 9/19/18. If WBC within goal - restart; if not, Pentamidine tx.   Kalpana for thrush prevention (Nystatin Dc'd)    Labs:   Reviewed in clinic. CMV pending      Medication record reviewed and reconciled  Questions and concerns addressed  Pt verbalized an understanding of plan of care.     Patient Instructions  ~Please call your coordinator at [phone] with any questions or concerns.    ~Labs on Friday 8:15 am with Prograf level (12-hour trough). Will try to arrange for you to see Pharmacist after appt.   ~Discontinue Elliquis today.  ~Discontinue Nystatin, cont kalpana.  ~Call with any  fevers.  ~Follow sliding scale insulin (Novolog) as directed on discharge summary.

## 2018-10-05 ENCOUNTER — RADIANT APPOINTMENT (OUTPATIENT)
Dept: CT IMAGING | Facility: CLINIC | Age: 63
End: 2018-10-05
Attending: INTERNAL MEDICINE
Payer: MEDICARE

## 2018-10-05 ENCOUNTER — OFFICE VISIT (OUTPATIENT)
Dept: PHARMACY | Facility: CLINIC | Age: 63
End: 2018-10-05
Payer: COMMERCIAL

## 2018-10-05 ENCOUNTER — TELEPHONE (OUTPATIENT)
Dept: TRANSPLANT | Facility: CLINIC | Age: 63
End: 2018-10-05

## 2018-10-05 ENCOUNTER — RADIANT APPOINTMENT (OUTPATIENT)
Dept: CT IMAGING | Facility: CLINIC | Age: 63
End: 2018-10-05
Payer: MEDICARE

## 2018-10-05 VITALS — HEART RATE: 77 BPM | SYSTOLIC BLOOD PRESSURE: 120 MMHG | TEMPERATURE: 98.4 F | DIASTOLIC BLOOD PRESSURE: 77 MMHG

## 2018-10-05 DIAGNOSIS — N18.5 TYPE 2 DIABETES MELLITUS WITH STAGE 5 CHRONIC KIDNEY DISEASE NOT ON CHRONIC DIALYSIS, WITHOUT LONG-TERM CURRENT USE OF INSULIN (H): Primary | ICD-10-CM

## 2018-10-05 DIAGNOSIS — Z94.1 HEART REPLACED BY TRANSPLANT (H): ICD-10-CM

## 2018-10-05 DIAGNOSIS — E11.22 TYPE 2 DIABETES MELLITUS WITH STAGE 5 CHRONIC KIDNEY DISEASE NOT ON CHRONIC DIALYSIS, WITHOUT LONG-TERM CURRENT USE OF INSULIN (H): Primary | ICD-10-CM

## 2018-10-05 DIAGNOSIS — L85.3 XEROSIS CUTIS: ICD-10-CM

## 2018-10-05 DIAGNOSIS — Z94.1 HEART TRANSPLANTED (H): ICD-10-CM

## 2018-10-05 DIAGNOSIS — M54.5 LOW BACK PAIN, UNSPECIFIED BACK PAIN LATERALITY, UNSPECIFIED CHRONICITY, WITH SCIATICA PRESENCE UNSPECIFIED: ICD-10-CM

## 2018-10-05 DIAGNOSIS — Z94.1 HEART REPLACED BY TRANSPLANT (H): Primary | ICD-10-CM

## 2018-10-05 DIAGNOSIS — E78.5 HYPERLIPIDEMIA LDL GOAL <100: ICD-10-CM

## 2018-10-05 DIAGNOSIS — M10.9 GOUT, UNSPECIFIED CAUSE, UNSPECIFIED CHRONICITY, UNSPECIFIED SITE: ICD-10-CM

## 2018-10-05 DIAGNOSIS — J30.1 SEASONAL ALLERGIC RHINITIS DUE TO POLLEN: ICD-10-CM

## 2018-10-05 DIAGNOSIS — M1A.0390 CHRONIC GOUT OF WRIST, UNSPECIFIED CAUSE, UNSPECIFIED LATERALITY: ICD-10-CM

## 2018-10-05 DIAGNOSIS — I15.1 HYPERTENSION SECONDARY TO OTHER RENAL DISORDERS: ICD-10-CM

## 2018-10-05 DIAGNOSIS — N18.5 CKD (CHRONIC KIDNEY DISEASE) STAGE 5, GFR LESS THAN 15 ML/MIN (H): ICD-10-CM

## 2018-10-05 LAB
ALP SERPL-CCNC: 103 U/L (ref 40–150)
ALT SERPL W P-5'-P-CCNC: 14 U/L (ref 0–70)
ANION GAP SERPL CALCULATED.3IONS-SCNC: 6 MMOL/L (ref 3–14)
AST SERPL W P-5'-P-CCNC: 18 U/L (ref 0–45)
BILIRUB SERPL-MCNC: 0.5 MG/DL (ref 0.2–1.3)
BUN SERPL-MCNC: 20 MG/DL (ref 7–30)
CALCIUM SERPL-MCNC: 8.8 MG/DL (ref 8.5–10.1)
CHLORIDE SERPL-SCNC: 99 MMOL/L (ref 94–109)
CO2 SERPL-SCNC: 31 MMOL/L (ref 20–32)
CREAT SERPL-MCNC: 3.94 MG/DL (ref 0.66–1.25)
CRP SERPL-MCNC: 142 MG/L (ref 0–8)
DONOR IDENTIFICATION: NORMAL
DSA COMMENTS: NORMAL
DSA PRESENT: NO
DSA TEST METHOD: NORMAL
ERYTHROCYTE [DISTWIDTH] IN BLOOD BY AUTOMATED COUNT: 19 % (ref 10–15)
GFR SERPL CREATININE-BSD FRML MDRD: 15 ML/MIN/1.7M2
GLUCOSE SERPL-MCNC: 95 MG/DL (ref 70–99)
HCT VFR BLD AUTO: 29.7 % (ref 40–53)
HGB BLD-MCNC: 9.1 G/DL (ref 13.3–17.7)
MAGNESIUM SERPL-MCNC: 1.4 MG/DL (ref 1.6–2.3)
MCH RBC QN AUTO: 30.3 PG (ref 26.5–33)
MCHC RBC AUTO-ENTMCNC: 30.6 G/DL (ref 31.5–36.5)
MCV RBC AUTO: 99 FL (ref 78–100)
ORGAN: NORMAL
PHOSPHATE SERPL-MCNC: 1.7 MG/DL (ref 2.5–4.5)
PLATELET # BLD AUTO: 271 10E9/L (ref 150–450)
POTASSIUM SERPL-SCNC: 3.7 MMOL/L (ref 3.4–5.3)
RBC # BLD AUTO: 3 10E12/L (ref 4.4–5.9)
SA1 CELL: NORMAL
SA1 COMMENTS: NORMAL
SA1 HI RISK ABY: NORMAL
SA1 MOD RISK ABY: NORMAL
SA1 TEST METHOD: NORMAL
SA2 CELL: NORMAL
SA2 COMMENTS: NORMAL
SA2 HI RISK ABY UA: NORMAL
SA2 MOD RISK ABY: NORMAL
SA2 TEST METHOD: NORMAL
SODIUM SERPL-SCNC: 136 MMOL/L (ref 133–144)
TACROLIMUS BLD-MCNC: 9 UG/L (ref 5–15)
TME LAST DOSE: NORMAL H
URATE SERPL-MCNC: 3.2 MG/DL (ref 3.5–7.2)
WBC # BLD AUTO: 7.6 10E9/L (ref 4–11)

## 2018-10-05 PROCEDURE — 80197 ASSAY OF TACROLIMUS: CPT | Performed by: INTERNAL MEDICINE

## 2018-10-05 PROCEDURE — 87040 BLOOD CULTURE FOR BACTERIA: CPT | Mod: AY | Performed by: INTERNAL MEDICINE

## 2018-10-05 PROCEDURE — 99607 MTMS BY PHARM ADDL 15 MIN: CPT | Performed by: PHARMACIST

## 2018-10-05 PROCEDURE — 87799 DETECT AGENT NOS DNA QUANT: CPT | Performed by: STUDENT IN AN ORGANIZED HEALTH CARE EDUCATION/TRAINING PROGRAM

## 2018-10-05 PROCEDURE — 87799 DETECT AGENT NOS DNA QUANT: CPT | Performed by: INTERNAL MEDICINE

## 2018-10-05 PROCEDURE — 99606 MTMS BY PHARM EST 15 MIN: CPT | Performed by: PHARMACIST

## 2018-10-05 RX ORDER — IOPAMIDOL 755 MG/ML
105 INJECTION, SOLUTION INTRAVASCULAR ONCE
Status: COMPLETED | OUTPATIENT
Start: 2018-10-05 | End: 2018-10-05

## 2018-10-05 RX ADMIN — IOPAMIDOL 105 ML: 755 INJECTION, SOLUTION INTRAVASCULAR at 12:04

## 2018-10-05 NOTE — DISCHARGE INSTRUCTIONS

## 2018-10-05 NOTE — PROGRESS NOTES
SUBJECTIVE/OBJECTIVE:                Murray Nicholson is a 63 year old male coming in for a follow-up visit for Medication Therapy Management.  He was referred to me from txp team.     Chief Complaint: Follow up from our visit on 7/24/18. Pt has a cholostomy bag since infection. Has a rolling cart where he keeps all his medications.     Tobacco: No tobacco use  Alcohol: not currently using    Medication Adherence/Access:  Patient uses pill box(es).  Patient takes medications 4 time(s) per day. 7-7:30am, 12pm, 4-6pm, 8-10pm.   Per patient, misses medication 0 times per week. Sometimes takes evening doses around 10 pm vs. 8pm.   The patient fills medications at [place]: YES.    Heart Transplant:  Current immunosuppressants include TAC 1mg BID, prednisone 5mg BID. Pt complains of tremor.  Transplant date: 6/14/18  Estimated Creatinine Clearance: 11.8 mL/min (based on Cr of 7.09).  CMV prophylaxis: using Valcyte holding due to neutropenia.   PCP prophylaxis: Bactrim holding due to neutropenia. Receiving Pentamidine inhalations monthly.   Antifungal Prophylaxis: Nystatin until off steroids. QID.   PPI use: Pantoprazole 40mg BID. Denies GERD sx, Duodenitis  Current supplements for electrolyte replacement:  Nephrocaps  Tx Coordinator: Bob Ulloa MD: [place], Using Med Card: Yes  Recent Infections:  Concerns about a post OP infection. Pt is getting Vancomycin IV after Dialysis sessions.   Recent Hospitalizations: recent txp  Home BPs: See htn     Hypertension: Current medications include hydralazine 100mg TID, Amlodipine 10mg daily, clonidine 0.1mg at bedtime, Lisinopril 5mg daily, patient was prescribed potassium, but never picked it up. Now is potassium is back to normal.   Patient does self-monitor BP. 132/83, P99, 120/78, 132/86, 152/95, 140/96, 131/81, 159/99.  Patient reports no current medication side effects.    ESRD: Dialysis Tues, Th, Sat. Pt is taking Ciprofloxacin 500mg qAM for oral ulceration per chart.       Diabetes:  Pt currently taking NPH  24 units units qAM, 10 units qevening.  Has Novolog Sliding scale as well. Has been skipping NPH dose when BG are <100.   Sliding scale  140-189: 1 unit  190-239: 2 units  240-289: 3 units  290-339: 4 units  340-399: 5 units  400-449: 6 units  Over 450: 7 units and contact me  SMBG: two times daily.   Ranges (patient reported):   Date FBG/ 2hours post Dinner /2hours post   10/5 92 (skipped NPH due to this    10/4 79 153   10/3 102 206   10/2 73 223   10/1 124 147   9/30 116 204   9/29 109 199   9/28 92 200   9/27 88      Date FBG/ 2hours post Dinner /2hours post   7/24 271 213   7/23 265 460 (may have missed dose on this day)   7/22 203 204   7/21 54 (shaky) 230   7/20 383 157   7/19 151 226      Date FBG/ 2hours post Dinner /2hours post   7/6 (increased NPH) 227     7/5 244 312   7/4 195 306   7/3 152 199   7/2 Discharged from hospital.  173      Patient is experiencing mild hypoglycemia in the mornings, but no sx.   Recent symptoms of high blood sugar? none  Diet/Exercise: Spinach, potato, rotisserie chicken, Carrots dinner. Low sodium     Hyperlipidemia: Current therapy includes Crestor 20mg daily, Aspirin 81mg daily.   Pt reports no significant myalgias or other side effects.  The 10-year ASCVD risk score (Blossvale DC Jr, et al., 2013) is: 17.9%    Values used to calculate the score:      Age: 63 years      Sex: Male      Is Non- : Yes      Diabetic: Yes      Tobacco smoker: No      Systolic Blood Pressure: 114 mmHg      Is BP treated: Yes      HDL Cholesterol: 92 mg/dL      Total Cholesterol: 175 mg/dL    Pain: Pt is taking Tramadol 50mg prn, takes APAP. Lower back pain.     Allergic rhinitis: Current medications include loratadine 10mg once daily prn, Flonase. Primary symptoms are nasal congestion and post-nasal drip. Pt feels that current therapy is effective.     Xerosis: Pt is taking Triamcinolone 0.1% prn for peeling. Has not needed recently.      Today's Vitals: /77  Pulse 77  Temp 98.4  F (36.9  C)      ASSESSMENT:              Current medications were reviewed today as discussed above.      Medication Adherence: fair, needs improvement - see below. Pt puts forward a competent face, but as described in previous notes, he does struggle a bit with his medications.     Heart Transplant:  Went through immunosuppressants on med card, they are accurately described. Will cover again at next visit. Pt is taking meds from 7am to 10pm, which is a 15 hour window. Reinforced the importance of 12 hour window, to take at 7:30am-8 am and 8 pm every day. Recommended pt start using alarm on cell phone to remind him.    Hypertension: BP at goal <130/80 today.     ESRD: Recommend dosing Cipro after dialysis as it is partially removed (~10%).       Diabetes: Needs improvement. Educated on the difference between NPH insulin and Novolog sliding scale. Instructed patient not to skip NPH unless BG is <70 as it is a long acting insulin. He needs a reduction in evening NPH as his BG is  in the morning. He was told after visit completed to get a CT today and told not to eat, but he took his NPH during visit before this instruction. Gave him apple juice and crackers and told him to check BG in a couple hours.     Hyperlipidemia: Stable.     Pain: Stable.     Allergic rhinitis: Stable.     Xerosis: Stable.      PLAN:                  Pt to...  1. Set an alarm on your cell phone for your 8 am and 8 pm doses. These are important to keep exactly 12 hours apart to assure your heart has immunosuppression coverage for the whole day and prevent rejection.   2. Move your Ciprofloxacin dose to the evening. This will limit how much is removed during dialysis.   3. Give you Humulin NPH insulin regardless of your blood sugars unless you are below 70. Lower your evening dose of NPH to 7 units. Your new instructions will be 24 units in the morning and 7 units at night. We will go  through your sugars next Friday and make further adjustments call me if these go too low.     I spent 70 minutes with this patient today. I offer these suggestions for consideration by txp team. A copy of the visit note was provided to the patient's txp provider.     Will follow up in 1 week adjust NPH doses.    The patient was given a summary of these recommendations as an after visit summary.    Eduardo Lea, PharmD  [facility] Pharmacist    Phone: [phone]

## 2018-10-05 NOTE — Clinical Note
I saw your patient for a medication review. I reduced his evening NPH and gave him further education regarding insulin use. I will follow up in one week and make further adjustments prn. See plan for other changes  Eduardo Lea, PharmD [facility] Pharmacist  Phone: [phone]

## 2018-10-05 NOTE — MR AVS SNAPSHOT
After Visit Summary   10/5/2018    Murray Nicholson    MRN: [id number]           Patient Information     Date Of Birth          1955        Visit Information        Provider Department      10/5/2018 8:30 [facility]        Care Instructions    Recommendations from today's MTM visit:                                                      1. Set an alarm on your cell phone for your 8 am and 8 pm doses. These are important to keep exactly 12 hours apart to assure your heart has immunosuppression coverage for the whole day and prevent rejection.     2. Move your Ciprofloxacin dose to the evening. This will limit how much is removed during dialysis.     3. Give you Humulin NPH insulin regardless of your blood sugars unless you are below 70. Lower your evening dose of NPH to 7 units. Your new instructions will be 24 units in the morning and 7 units at night. We will go through your sugars next Friday and make further adjustments call me if these go too low.     Novolog premeal dosin-189: 1 unit  190-239: 2 units  240-289: 3 units  290-339: 4 units  340-399: 5 units  400-449: 6 units  Over 450: 7 units and contact me      nject 1-7 Units Subcutaneous 4 times daily (before meals and nightly), Disp-3 mL, R-0, Local Print  Medium Correctional Scale For Pre-Meal -189=1 unit, 190-239=2 units, 240-289=3 units, 290-339=4 units, 340-399=5 units, 400-449=6 units, BG = or >450=7 units. For bedtime -249=1 unit, 250-299=2 units, 300-349=3 units, 350-399=4 units, BG = or >400=5 units.    Next MTM visit: 10/12 at 8:30 AM    To schedule another MTM appointment, please call the clinic directly or you may call the MTM scheduling line at [phone] or toll-free at [phone].     My Clinical Pharmacist's contact information:                                                      It was a pleasure seeing you today!  Please feel free to contact me  with any questions or concerns you have.      Eduardo Lea, PharmD  MTM Pharmacist    Phone: [phone]     You may receive a survey about the MT services you received.  I would appreciate your feedback to help me serve you better in the future. Please fill it out and return it when you can. Your comments will be anonymous.              Follow-ups after your visit        Your next 10 appointments already scheduled     Oct 10, 2018  1:00 PM CDT   LAB with UU LAB GOLD WAITING   [facility] [place] ([facility])    [address] [address]              Please do not eat 10-12 hours before your appointment if you are coming in fasting for labs on lipids, cholesterol, or glucose (sugar). This does not apply to pregnant women. Water, hot tea and black coffee (with nothing added) are okay. Do not drink other fluids, diet soda or chew gum.            Oct 10, 2018  1:30 PM CDT   Procedure - 2.5 hour with U2A ROOM 11   [address] [facility] [place] ([facility])    [address] [address]               Oct 10, 2018  2:30 PM CDT   Heart Cath Heart Biopsy with [id number]   [facility]  Heart Cath Lab ([facility])    [address] [id number] [phone]            Oct 12, 2018  7:30 AM CDT   LAB with  LAB    Health Lab ([facility] [place])    [address]  [address] [facility] [id number] [phone]           Please do not eat 10-12 hours before your appointment if you are coming in fasting for labs on lipids, cholesterol, or glucose (sugar). This does not apply to pregnant women. Water, hot tea and black coffee (with nothing added) are okay. Do not drink other fluids, diet soda or chew gum.            Oct 12, 2018  8:30 AM CDT   (Arrive by 8:15 AM)   Office Visit with Eduardo Lea [facility] Medication  Therapy Management ([facility])    [address] [place] [id number] [phone]           Bring a current list of meds and any records pertaining to this visit. For Physicals, please bring immunization records and any forms needing to be filled out. Please arrive 10 minutes early to complete paperwork.            Oct 15, 2018  9:30 AM CDT   (Arrive by 9:15 AM)   New Patient Visit with Rick Tran MD   [facility] Rectal Surgery ([facility])    [address] [place] [id number] [phone]            Oct 19, 2018  9:00 AM CDT   LAB with  LAB   [facility] Lab ([facility])    [address] [place] [id number] [phone]           Please do not eat 10-12 hours before your appointment if you are coming in fasting for labs on lipids, cholesterol, or glucose (sugar). This does not apply to pregnant women. Water, hot tea and black coffee (with nothing added) are okay. Do not drink other fluids, diet soda or chew gum.            Oct 19, 2018 10:30 AM CDT   (Arrive by 10:15 AM)   New Patient Visit with Tristan Bañuelos MD   [facility] Ear Nose and Throat ([facility])    [address] [place] [id number] [phone]            Oct 26, 2018  7:30 AM CDT   LAB with  LAB   [facility] Lab ([facility])    [address] [place] [id number] [phone]           Please do not eat 10-12 hours before your appointment if you are coming in fasting for labs on lipids, cholesterol, or glucose (sugar). This does not apply to pregnant women. Water, hot tea and black coffee (with nothing added) are okay. Do not drink other fluids, diet soda or chew gum.            Oct 31, 2018  3:30 PM CDT   (Arrive by 3:15 PM)   Return Visit with Vinayak Lawrence MD     [facility] ([facility])    [address]  [address]  [facility] [address] [phone]              Future tests that were ordered for you today     Open Future Orders        Priority Expected Expires Ordered    CT Chest/Abdomen/Pelvis w Contrast STAT  10/5/2019 10/5/2018    CT Facial Bones with Contrast Routine  10/5/2019 10/5/2018            Who to contact     If you have questions or need follow up information about today's clinic visit or your schedule please contact [facility] MEDICATION THERAPY MANAGEMENT directly at [phone].  Normal or non-critical lab and imaging results will be communicated to you by Fitzealhart, letter or phone within 4 business days after the clinic has received the results. If you do not hear from us within 7 days, please contact the clinic through Soufunt or phone. If you have a critical or abnormal lab result, we will notify you by phone as soon as possible.  Submit refill requests through trippiece or call your pharmacy and they will forward the refill request to us. Please allow 3 business days for your refill to be completed.          Additional Information About Your Visit        MyChart Information     trippiece gives you secure access to your electronic health record. If you see a primary care provider, you can also send messages to your care team and make appointments. If you have questions, please call your primary care clinic.  If you do not have a primary care provider, please call [phone] and they will assist you.        Care EveryWhere ID     This is your Care EveryWhere ID. This could be used by other organizations to access your [facility] medical records  [id number]         Blood Pressure from Last 3 Encounters:   10/03/18 108/74   09/26/18 126/78   09/11/18 (!) 152/93    Weight from Last 3 Encounters:   10/03/18 171 lb 6.4 oz (77.7 kg)   09/26/18 165 lb 14.4 oz (75.3 kg)   09/11/18 176 lb 6.4 oz  (80 kg)              Today, you had the following     No orders found for display         Today's Medication Changes          These changes are accurate as of 10/5/18  9:51 AM.  If you have any questions, ask your nurse or doctor.               Stop taking these medicines if you haven't already. Please contact your care team if you have questions.     glucagon 1 MG Solr injection   Stopped by:  Eduardo Lea, [facility]           glucose 40 % (400 mg/mL) Gel gel   Stopped by:  Eduardo Lea [facility]                    Primary Care Provider Office Phone # Fax #    Yahir Alex Turcios [phone]       [address]  [facility] [address]        Goals        Lifestyle    Increase physical activity     Notes - Note created  7/3/2018  1:51 PM by Carrie Tran, RN    Goal Statement: I will start cardiac rehab  Measure of Success: starts cardiac rehab  Supportive Steps to Achieve: support of RN CC  Barriers: none  Strengths: willingness to participate   Date to Achieve By: 7-15-18          Equal Access to Services     [facility]: Hadii aad ku hadasho Soomaali, waaxda luqadaha, qaybta kaalmada adeegyada, waxay idiin hayaan basilioeg kharash la'aan . So [facility] [phone].    ATENCIÓN: Si habla español, tiene a ortiz disposición servicios gratuitos de asistencia lingüística. Llame al [phone].    We comply with applicable federal civil rights laws and [place] laws. We do not discriminate on the basis of race, color, national origin, age, disability, sex, sexual orientation, or gender identity.            Thank you!     Thank you for choosing [facility] MEDICATION THERAPY MANAGEMENT  for your care. Our goal is always to provide you with excellent care. Hearing back from our patients is one way we can continue to improve our services. Please take a few minutes to complete the written survey that you may receive in the mail after your visit with us. Thank you!             Your Updated Medication List -  Protect others around you: Learn how to safely use, store and throw away your medicines at www.disposemymeds.org.          This list is accurate as of 10/5/18  9:51 AM.  Always use your most recent med list.                   Brand Name Dispense Instructions for use Diagnosis    albuterol 108 (90 Base) MCG/ACT inhaler    PROAIR HFA/PROVENTIL HFA/VENTOLIN HFA     Inhale 2 puffs into the lungs every 4 hours as needed for shortness of breath / dyspnea or wheezing        amLODIPine 10 MG tablet    NORVASC    30 tablet    Take 1 tablet (10 mg) by mouth daily    Hypertension goal BP (blood pressure) < 130/80       aspirin 81 MG tablet      Take 1 tablet (81 mg) by mouth at bedtime        biotin 5 MG Caps    BIOTIN 5000    30 capsule    Take 5 mg by mouth daily    Brittle nails       blood glucose monitoring lancets     102 each    Use to test blood sugar 2-3 times daily or as directed.  Ok to substitute alternative if insurance prefers.    Type 2 diabetes mellitus with stage 5 chronic kidney disease not on chronic dialysis, without long-term current use of insulin (H)       blood glucose monitoring test strip    no brand specified    100 each    Use to test blood sugar 2-3 times daily or as directed.    Type 2 diabetes mellitus with stage 5 chronic kidney disease not on chronic dialysis, without long-term current use of insulin (H)       chlorhexidine 0.12 % solution    PERIDEX    900 mL    Swish and spit 15 mLs in mouth 2 times daily    Oral ulceration       ciprofloxacin 500 MG tablet    CIPRO    14 tablet    Take 1 tablet (500 mg) by mouth every 24 hours    Oral ulceration       CLONIDINE HCL PO      Take 0.1 mg by mouth At Bedtime        clotrimazole 10 MG kalpana     40 Kalpana    Place 1 Kalpana (10 mg) inside cheek 4 times daily    Candidiasis of mouth       fluticasone 50 MCG/ACT spray    FLONASE    16 g    Spray 1-2 sprays into both nostrils daily    Nasal congestion       hydrALAZINE 100 MG Tabs tablet     APRESOLINE    60 tablet    Take 1 tablet (100 mg) by mouth every 8 hours    Essential hypertension       insulin aspart 100 UNIT/ML injection    NovoLOG PEN    3 mL    Inject 1-7 Units Subcutaneous 4 times daily (before meals and nightly)    Type 2 diabetes mellitus with diabetic nephropathy, with long-term current use of insulin (H)       * insulin isophane human 100 UNIT/ML injection    HumuLIN N PEN     Inject 12 Units Subcutaneous daily (with dinner)    Type 2 diabetes mellitus with diabetic nephropathy, with long-term current use of insulin (H)       * insulin isophane human 100 UNIT/ML injection    HumuLIN N PEN     Inject 16 Units Subcutaneous every morning (before breakfast)    Type 2 diabetes mellitus with diabetic nephropathy, with long-term current use of insulin (H)       lisinopril 5 MG tablet    PRINIVIL/ZESTRIL    30 tablet    Take 1 tablet (5 mg) by mouth daily    Hypertension goal BP (blood pressure) < 130/80       loratadine 10 MG tablet    CLARITIN     Take 10 mg by mouth daily Reported on 5/3/2017    Hypertensive cardiopathy, SOB (shortness of breath)       melatonin 1 MG Tabs tablet     120 tablet    Take 2 tablets (2 mg) by mouth nightly as needed for sleep    Heart transplanted (H)       NEPHROCAPS 1 MG capsule     120 capsule    Take 1 capsule by mouth daily        order for DME     1 Units    Equipment being ordered: Carol () Treatment Diagnosis: ESRD on PD Pt has to be connected to PD all night and can not be disconnected, hence impending his mobility to go to the bathroom. At risk for infection if he does not have this equipment.    CKD (chronic kidney disease) stage 5, GFR less than 15 ml/min (H)       pantoprazole 40 MG EC tablet    PROTONIX    60 tablet    Take 1 tablet (40 mg) by mouth 2 times daily (before meals)    Duodenitis       pentamidine 300 MG neb solution    NEBUPENT    300 mg    Inhale 300 mg into the lungs every 28 days Last given 9/19/18    Heart replaced by  transplant (H)       polyethylene glycol Packet    MIRALAX/GLYCOLAX    7 packet    Take 17 g by mouth daily as needed for constipation    Constipation, unspecified constipation type       potassium chloride SA 20 MEQ CR tablet    KLOR-CON    3 tablet    Take 1 tablet (20 mEq) by mouth daily as needed for potassium supplementation    Heart replaced by transplant (H)       predniSONE 5 MG tablet    DELTASONE    60 tablet    Take 1 tablet (5 mg) by mouth 2 times daily (with meals)    Heart transplanted (H)       rosuvastatin 20 MG tablet    CRESTOR    30 tablet    Take 1 tablet (20 mg) by mouth daily    Heart transplant recipient (H)       simethicone 80 MG chewable tablet    MYLICON    180 tablet    Take 1 tablet (80 mg) by mouth every 6 hours as needed for cramping    Flatulence, eructation and gas pain       tacrolimus 1 MG capsule    GENERIC EQUIVALENT    60 capsule    Take 1 capsule (1 mg) by mouth 2 times daily    Heart transplanted (H)       traMADol 50 MG tablet    ULTRAM    10 tablet    Take 1 tablet (50 mg) by mouth every 12 hours as needed for moderate pain    Moderate pain       triamcinolone 0.1 % ointment    KENALOG    80 g    Apply topically 2 times daily    Xerosis of skin       Urea 40 % Crea     85 g    Externally apply topically daily    Brittle nails       * Notice:  This list has 2 medication(s) that are the same as other medications prescribed for you. Read the directions carefully, and ask your doctor or other care provider to review them with you.

## 2018-10-05 NOTE — TELEPHONE ENCOUNTER
CRP today 142 - up from 114 two days ago. WBC 7.6. Reviewed with Dr RAPP. Requested CT scans today.     Pt called with results and next steps.

## 2018-10-06 ENCOUNTER — TELEPHONE (OUTPATIENT)
Dept: TRANSPLANT | Facility: CLINIC | Age: 63
End: 2018-10-06

## 2018-10-07 NOTE — TELEPHONE ENCOUNTER
"Received message from transplant coordinator to monitor blood cultures over weekend and contact patient for a status update.     10/6 cultures x 2 negative at 21 hours. Contacted patient who denied any signs and symptoms reporting \"I feel good and will call if things change\". Updated Transplant Cardiologist Dr Ernst.     10/7 cultures at 48 hours remain negative. Sent message to transplant coordinator.   "

## 2018-10-08 DIAGNOSIS — Z94.1 HEART REPLACED BY TRANSPLANT (H): Primary | ICD-10-CM

## 2018-10-08 LAB
EBV DNA # SPEC NAA+PROBE: NORMAL {COPIES}/ML
EBV DNA SPEC NAA+PROBE-LOG#: NORMAL {LOG_COPIES}/ML

## 2018-10-09 ENCOUNTER — TELEPHONE (OUTPATIENT)
Dept: TRANSPLANT | Facility: CLINIC | Age: 63
End: 2018-10-09

## 2018-10-09 ENCOUNTER — DOCUMENTATION ONLY (OUTPATIENT)
Dept: TRANSPLANT | Facility: CLINIC | Age: 63
End: 2018-10-09

## 2018-10-09 DIAGNOSIS — K12.1 ORAL ULCERATION: ICD-10-CM

## 2018-10-09 DIAGNOSIS — Z94.1 HEART REPLACED BY TRANSPLANT (H): Primary | ICD-10-CM

## 2018-10-09 DIAGNOSIS — B37.0 CANDIDIASIS OF MOUTH: ICD-10-CM

## 2018-10-09 RX ORDER — CLOTRIMAZOLE 10 MG/1
1 LOZENGE ORAL 4 TIMES DAILY
Qty: 120 TROCHE | Refills: 11 | Status: SHIPPED | OUTPATIENT
Start: 2018-10-09 | End: 2019-03-13

## 2018-10-09 RX ORDER — ATORVASTATIN CALCIUM 40 MG/1
40 TABLET, FILM COATED ORAL DAILY
Qty: 90 TABLET | Refills: 3 | Status: SHIPPED | OUTPATIENT
Start: 2018-10-09 | End: 2019-10-17

## 2018-10-09 RX ORDER — CIPROFLOXACIN 500 MG/1
500 TABLET, FILM COATED ORAL EVERY 24 HOURS
Qty: 14 TABLET | Refills: 0 | Status: SHIPPED | OUTPATIENT
Start: 2018-10-09 | End: 2018-10-18

## 2018-10-09 NOTE — TELEPHONE ENCOUNTER
10/5 - scans reviewed by TT - cont to monitor temp.    OC coordinator will check on pt over weekend.

## 2018-10-09 NOTE — TELEPHONE ENCOUNTER
"Pt called to request med refills. Cipro prescription states to cont until pt sees ENT (scheduled 10/19/18); refill provided. Cont josefina while mouth healing. Switch from Crestor to Atorvastatin due to sig cost difference (of per TT).     Pt reports temps 97-98. No 99 since Friday 10/5. Labs with CRP 10/10.  Tongue \"better\", feels like there is a lot of skin on the R side roof of mouth. Will plan to assess tomorrow with Dr RAPP (seeing on 2A following biopsy)     FK level 9 - previous on same dose 6.5 - will plan to recheck this Friday 10/12 - if still high will decrease.      Enc to call w/any questions or concerns         "

## 2018-10-10 ENCOUNTER — APPOINTMENT (OUTPATIENT)
Dept: CARDIOLOGY | Facility: CLINIC | Age: 63
End: 2018-10-10
Attending: INTERNAL MEDICINE
Payer: MEDICARE

## 2018-10-10 ENCOUNTER — HOSPITAL ENCOUNTER (OUTPATIENT)
Facility: CLINIC | Age: 63
Discharge: HOME | End: 2018-10-10
Attending: INTERNAL MEDICINE | Admitting: INTERNAL MEDICINE
Payer: MEDICARE

## 2018-10-10 ENCOUNTER — APPOINTMENT (OUTPATIENT)
Dept: MEDSURG UNIT | Facility: CLINIC | Age: 63
End: 2018-10-10
Attending: INTERNAL MEDICINE
Payer: MEDICARE

## 2018-10-10 ENCOUNTER — DOCUMENTATION ONLY (OUTPATIENT)
Dept: TRANSPLANT | Facility: CLINIC | Age: 63
End: 2018-10-10

## 2018-10-10 VITALS
OXYGEN SATURATION: 100 % | TEMPERATURE: 97.8 F | DIASTOLIC BLOOD PRESSURE: 92 MMHG | HEIGHT: 69 IN | RESPIRATION RATE: 18 BRPM | BODY MASS INDEX: 25.18 KG/M2 | SYSTOLIC BLOOD PRESSURE: 168 MMHG | WEIGHT: 170 LBS | HEART RATE: 87 BPM

## 2018-10-10 DIAGNOSIS — E11.21 TYPE 2 DIABETES MELLITUS WITH DIABETIC NEPHROPATHY, WITH LONG-TERM CURRENT USE OF INSULIN (H): ICD-10-CM

## 2018-10-10 DIAGNOSIS — Z94.1 HEART REPLACED BY TRANSPLANT (H): ICD-10-CM

## 2018-10-10 DIAGNOSIS — Z94.1 HEART TRANSPLANTED (H): ICD-10-CM

## 2018-10-10 DIAGNOSIS — E11.9 DIABETES (H): ICD-10-CM

## 2018-10-10 DIAGNOSIS — Z94.1 HEART REPLACED BY TRANSPLANT (H): Primary | ICD-10-CM

## 2018-10-10 DIAGNOSIS — Z79.4 TYPE 2 DIABETES MELLITUS WITH DIABETIC NEPHROPATHY, WITH LONG-TERM CURRENT USE OF INSULIN (H): ICD-10-CM

## 2018-10-10 LAB
ANION GAP SERPL CALCULATED.3IONS-SCNC: 5 MMOL/L (ref 3–14)
BUN SERPL-MCNC: 31 MG/DL (ref 7–30)
CALCIUM SERPL-MCNC: 9 MG/DL (ref 8.5–10.1)
CHLORIDE SERPL-SCNC: 102 MMOL/L (ref 94–109)
CO2 SERPL-SCNC: 30 MMOL/L (ref 20–32)
CREAT SERPL-MCNC: 4.63 MG/DL (ref 0.66–1.25)
CRP SERPL-MCNC: 33 MG/L (ref 0–8)
ERYTHROCYTE [DISTWIDTH] IN BLOOD BY AUTOMATED COUNT: 19.3 % (ref 10–15)
GFR SERPL CREATININE-BSD FRML MDRD: 13 ML/MIN/1.7M2
GLUCOSE SERPL-MCNC: 192 MG/DL (ref 70–99)
HCT VFR BLD AUTO: 29.7 % (ref 40–53)
HGB BLD-MCNC: 8.9 G/DL (ref 13.3–17.7)
MAGNESIUM SERPL-MCNC: 1.6 MG/DL (ref 1.6–2.3)
MCH RBC QN AUTO: 29.5 PG (ref 26.5–33)
MCHC RBC AUTO-ENTMCNC: 30 G/DL (ref 31.5–36.5)
MCV RBC AUTO: 98 FL (ref 78–100)
PHOSPHATE SERPL-MCNC: 1.3 MG/DL (ref 2.5–4.5)
PLATELET # BLD AUTO: 345 10E9/L (ref 150–450)
POTASSIUM SERPL-SCNC: 4.5 MMOL/L (ref 3.4–5.3)
RBC # BLD AUTO: 3.02 10E12/L (ref 4.4–5.9)
SODIUM SERPL-SCNC: 137 MMOL/L (ref 133–144)
WBC # BLD AUTO: 8.4 10E9/L (ref 4–11)

## 2018-10-10 PROCEDURE — 80048 BASIC METABOLIC PNL TOTAL CA: CPT | Performed by: INTERNAL MEDICINE

## 2018-10-10 PROCEDURE — 25000125 ZZHC RX 250: Performed by: INTERNAL MEDICINE

## 2018-10-10 PROCEDURE — 88307 TISSUE EXAM BY PATHOLOGIST: CPT | Performed by: INTERNAL MEDICINE

## 2018-10-10 PROCEDURE — 88350 IMFLUOR EA ADDL 1ANTB STN PX: CPT | Performed by: INTERNAL MEDICINE

## 2018-10-10 PROCEDURE — 93505 ENDOMYOCARDIAL BIOPSY: CPT

## 2018-10-10 PROCEDURE — 86352 CELL FUNCTION ASSAY W/STIM: CPT | Performed by: INTERNAL MEDICINE

## 2018-10-10 PROCEDURE — 40000167 ZZH STATISTIC PP CARE STAGE 2

## 2018-10-10 PROCEDURE — 87799 DETECT AGENT NOS DNA QUANT: CPT | Performed by: INTERNAL MEDICINE

## 2018-10-10 PROCEDURE — 36415 COLL VENOUS BLD VENIPUNCTURE: CPT | Performed by: INTERNAL MEDICINE

## 2018-10-10 PROCEDURE — 84100 ASSAY OF PHOSPHORUS: CPT | Performed by: INTERNAL MEDICINE

## 2018-10-10 PROCEDURE — 83735 ASSAY OF MAGNESIUM: CPT | Performed by: INTERNAL MEDICINE

## 2018-10-10 PROCEDURE — 88346 IMFLUOR 1ST 1ANTB STAIN PX: CPT | Performed by: INTERNAL MEDICINE

## 2018-10-10 PROCEDURE — 93505 ENDOMYOCARDIAL BIOPSY: CPT | Mod: 26 | Performed by: INTERNAL MEDICINE

## 2018-10-10 PROCEDURE — 85027 COMPLETE CBC AUTOMATED: CPT | Performed by: INTERNAL MEDICINE

## 2018-10-10 PROCEDURE — 86140 C-REACTIVE PROTEIN: CPT | Performed by: INTERNAL MEDICINE

## 2018-10-10 PROCEDURE — 27211047 ZZH FORCEP BIOPSY CR10

## 2018-10-10 PROCEDURE — 27210982 ZZH KIT RT HC TOTES DISP CR7

## 2018-10-10 PROCEDURE — C1893 INTRO/SHEATH, FIXED,NON-PEEL: HCPCS

## 2018-10-10 RX ORDER — LIDOCAINE 40 MG/G
CREAM TOPICAL
Status: COMPLETED | OUTPATIENT
Start: 2018-10-10 | End: 2018-10-10

## 2018-10-10 RX ADMIN — LIDOCAINE: 40 CREAM TOPICAL at 14:08

## 2018-10-10 NOTE — PROGRESS NOTES
Returned from [facility] post procedure.  Denies pain at puncture site.  LIJV site C/D/I.  Offered po food & fluid, wanted only water at this time.  Awaiting Lillie, his nurse coordinator & Dr. Ernst for discussion of plan of care prior to discharge.

## 2018-10-10 NOTE — PROGRESS NOTES
Met with patient to discuss risks and benefits of planned heart biopsy procedure. Discussed risks including, but not limited to, bleeding, infection, and arrhythmia. Consent form completed, signed, and awaiting physician co-signature.       Bobby Vogt PA-C  [facility] Cardiology Team

## 2018-10-10 NOTE — PROGRESS NOTES
Met with pt on 2A before and after biopsy - no temp >98. Feels his appetite is improving. VSS    Reviewed labs with Dr RAPP and pt. Happy to see CRP decreasing. Plan to recheck on Friday, along with prograf level. If level within goal and biopsy NER, decrease Pred to 5 mg daily per Dr RAPP.     Results for SANCHEZ MCNEIL (MRN [id number]) as of 10/10/2018 16:46   Ref. Range 10/3/2018 14:05 10/5/2018 08:38 10/10/2018 13:43   CRP Inflammation Latest Ref Range: 0.0 - 8.0 mg/L 114.0 (H) 142.0 (H) 33.0 (H)   WBC Latest Ref Range: 4.0 - 11.0 10e9/L 7.8 7.6 8.4

## 2018-10-10 NOTE — PROGRESS NOTES
Discharge instructions were reviewed with pt by Shanna ALCARAZ RN  Pt ambulated off unit after speaking with his nurse coordinator. Pt discharged.

## 2018-10-10 NOTE — DISCHARGE INSTRUCTIONS
[facility]                        Interventional Cardiology  Discharge Instructions   Post Right Heart Cath/Endomyocardial Biopsy      AFTER YOU GO HOME:    DO drink plenty of fluids    DO resume your regular diet and medications unless otherwise instructed by your Primary Physician    Do Not scrub the procedure site vigorously    No lotion or powder to the puncture site for 3 days    CALL YOUR PRIMARY PHYSICIAN IF: You may resume all normal activity.  Monitor neck site for bleeding, swelling, or voice changes. If you notice bleeding or swelling immediately apply pressure to the site and call number below to speak with Cardiology Fellow.  If you experience any changes in your breathing you should call your doctor immediately or come to the closest Emergency Department.  Do not drive yourself.    ADDITIONAL INSTRUCTIONS: Medications: You are to resume all home medications including anticoagulation therapy unless otherwise advised by your primary cardiologist or nurse coordinator.    Follow Up: Per your primary cardiology team    If you have any questions or concerns regarding your procedure site please call [phone] at anytime and ask for Cardiology Fellow on call.  They are available 24 hours a day.  You may also contact the Cardiology Clinic after hours number at [phone].                                                       Telephone Numbers [phone] Monday-Friday 8:00 am to 4:30 pm    [phone] After 4:30 pm Monday-Friday, Weekends & Holidays  Ask for Interventional Cardiologist on call. Someone is on call 24 hours/day   [facility] toll free number [phone] Monday-Friday 8:00 am to 4:30 pm   [facility] Emergency Dept [phone]

## 2018-10-10 NOTE — PROCEDURES
FINAL CARDIAC CATH REPORT:     PROCEDURES PERFORMED:   Endomyocardial Biopsy    PHYSICIANS:  Attending Physician: Montrell Posada MD  Interventional Cardiology Fellow: None  Cardiology Fellow: None    INDICATION:  Murray Nciholson is a 63 year old male s/p cardiac transplantation referred for elective endomyocardial biopsy.    DESCRIPTION:  1. Consent obtained with discussion of risks.  All questions were answered.  2. Sterile prep and procedure.  3. Location with Sheaths:   Lf IJ  7 Fr 40 cm [long] Daig sheath  4. Access: Local anesthetic with lidocaine.  A micropuncture 21 guage needle with ultrasound guidance was used to establish vascular access using a modified Seldinger technique.  5. Diagnostic Catheters:  JAWZ Bioptome  6. Guiding Catheters: None  None  6. Estimated blood loss: < 5 ml    MEDICATIONS:  The procedure was performed without conscious sedation.    Procedures:    ENDOMYOCARDIAL BIOPSY:  1. Successful collection of 5 right ventricular endomyocardial biopsy samples using the Jawz.      Sheath Removal:  The LF IJ sheath was manually removed in the cardiac catheterization laboratory.    Contrast: Isovue, 0 ml     Fluoroscopy Time: 3,5 min    COMPLICATIONS:  1. None    SUMMARY:   Endomyocardial biopsy, results pending    PLAN:   >> Continued medical management and lifestyle modification for cardiovascular risk factor optimization.     The attending interventional cardiologist was present and supervised all critical aspects the procedure.    Montrell Posada MD   Cardiology Staff

## 2018-10-10 NOTE — IP AVS SNAPSHOT
[address] [address]                                       After Visit Summary   10/10/2018    Murary Nicholson    MRN: [id number]           After Visit Summary Signature Page     I have received my discharge instructions, and my questions have been answered. I have discussed any challenges I see with this plan with the nurse or doctor.    ..........................................................................................................................................  Patient/Patient Representative Signature      ..........................................................................................................................................  Patient Representative Print Name and Relationship to Patient    ..................................................               ................................................  Date                                   Time    ..........................................................................................................................................  Reviewed by Signature/Title    ...................................................              ..............................................  Date                                               Time          22EPIC Rev 08/18

## 2018-10-10 NOTE — PROGRESS NOTES
D: Pt arrived in cath lab for endomyocardial tissue biopsy  I: Endomyocardial biopsy completed per MD.  7fr sheath removed from LIJ site, manual pressure applied until hemostasis achieved.  A: LIJ site clean, dry and intact. Soft, no hematoma.  P: Pt to transfer to  for discharge.  Pt educated on watching neck site for bleeding, hematoma, pressure on airway and voice changes.      Report called.

## 2018-10-10 NOTE — PROGRESS NOTES
Patient prepped for RH/endomyocardial biopsy.  Has dull ache pain at ostomy site left abdomen.  Consent needed.  H & P current.  Labs pending.  Self transport post-procedure.  Hard of hearing right ear.  Dialysis Tu-Th-Sat.

## 2018-10-10 NOTE — IP AVS SNAPSHOT
MRN:[id number]                      After Visit Summary   10/10/2018    Murray Nicholson    MRN: [id number]           Visit Information        Department      10/10/2018  1:01 PM [address] [facility]          Review of your medicines      UNREVIEWED medicines. Ask your doctor about these medicines        Dose / Directions    albuterol 108 (90 Base) MCG/ACT inhaler   Commonly known as:  PROAIR HFA/PROVENTIL HFA/VENTOLIN HFA        Dose:  2 puff   Inhale 2 puffs into the lungs every 4 hours as needed for shortness of breath / dyspnea or wheezing   Refills:  0       amLODIPine 10 MG tablet   Commonly known as:  NORVASC   Used for:  Hypertension goal BP (blood pressure) < 130/80        Dose:  10 mg   Take 1 tablet (10 mg) by mouth daily   Quantity:  30 tablet   Refills:  0       aspirin 81 MG tablet        Take 1 tablet (81 mg) by mouth at bedtime   Refills:  0       atorvastatin 40 MG tablet   Commonly known as:  LIPITOR   Used for:  Heart replaced by transplant (H)        Dose:  40 mg   Take 1 tablet (40 mg) by mouth daily   Quantity:  90 tablet   Refills:  3       biotin 5 MG Caps   Commonly known as:  BIOTIN 5000   Used for:  Brittle nails        Dose:  5 mg   Take 5 mg by mouth daily   Quantity:  30 capsule   Refills:  3       chlorhexidine 0.12 % solution   Commonly known as:  PERIDEX   Used for:  Oral ulceration        Dose:  15 mL   Swish and spit 15 mLs in mouth 2 times daily   Quantity:  900 mL   Refills:  0       ciprofloxacin 500 MG tablet   Commonly known as:  CIPRO   Indication:  Infection of the Skin and/or Related Soft Tissue, oral ulceration   Used for:  Oral ulceration        Dose:  500 mg   Take 1 tablet (500 mg) by mouth every 24 hours   Quantity:  14 tablet   Refills:  0       CLONIDINE HCL PO        Dose:  0.1 mg   Take 0.1 mg by mouth At Bedtime   Refills:  0       clotrimazole 10 MG kalpana   Used for:  Candidiasis of mouth        Dose:  1 Kalpana   Place 1 Kalpana (10 mg) inside  cheek 4 times daily   Quantity:  120 Kalpana   Refills:  11       fluticasone 50 MCG/ACT spray   Commonly known as:  FLONASE   Used for:  Nasal congestion        Dose:  1-2 spray   Spray 1-2 sprays into both nostrils daily   Quantity:  16 g   Refills:  11       hydrALAZINE 100 MG Tabs tablet   Commonly known as:  APRESOLINE   Used for:  Essential hypertension        Dose:  100 mg   Take 1 tablet (100 mg) by mouth every 8 hours   Quantity:  60 tablet   Refills:  0       insulin aspart 100 UNIT/ML injection   Commonly known as:  NovoLOG PEN   Used for:  Type 2 diabetes mellitus with diabetic nephropathy, with long-term current use of insulin (H)        Dose:  1-7 Units   Inject 1-7 Units Subcutaneous 4 times daily (before meals and nightly)   Quantity:  9 mL   Refills:  3       * insulin isophane human 100 UNIT/ML injection   Commonly known as:  HumuLIN N PEN   Indication:  Type 2 Diabetes   Used for:  Type 2 diabetes mellitus with diabetic nephropathy, with long-term current use of insulin (H)        Dose:  16 Units   Inject 16 Units Subcutaneous every morning (before breakfast)   Quantity:  6 mL   Refills:  11       * insulin isophane human 100 UNIT/ML injection   Commonly known as:  HumuLIN N PEN   Indication:  Type 2 Diabetes   Used for:  Type 2 diabetes mellitus with diabetic nephropathy, with long-term current use of insulin (H)        Dose:  12 Units   Inject 12 Units Subcutaneous daily (with dinner)   Quantity:  3 mL   Refills:  11       lisinopril 5 MG tablet   Commonly known as:  PRINIVIL/ZESTRIL   Indication:  High Blood Pressure Disorder   Used for:  Hypertension goal BP (blood pressure) < 130/80        Dose:  5 mg   Take 1 tablet (5 mg) by mouth daily   Quantity:  30 tablet   Refills:  0       loratadine 10 MG tablet   Commonly known as:  CLARITIN   Used for:  Hypertensive cardiopathy, SOB (shortness of breath)        Dose:  10 mg   Take 10 mg by mouth daily Reported on 5/3/2017   Refills:  0        melatonin 1 MG Tabs tablet   Used for:  Heart transplanted (H)        Dose:  2 mg   Take 2 tablets (2 mg) by mouth nightly as needed for sleep   Quantity:  120 tablet   Refills:  1       NEPHROCAPS 1 MG capsule        Dose:  1 capsule   Take 1 capsule by mouth daily   Quantity:  120 capsule   Refills:  0       pantoprazole 40 MG EC tablet   Commonly known as:  PROTONIX   Indication:  GI prophylaxis   Used for:  Duodenitis        Dose:  40 mg   Take 1 tablet (40 mg) by mouth 2 times daily (before meals)   Quantity:  60 tablet   Refills:  0       pentamidine 300 MG neb solution   Commonly known as:  NEBUPENT   Indication:  PJP prophylaxis post OHT   Used for:  Heart replaced by transplant (H)        Dose:  300 mg   Inhale 300 mg into the lungs every 28 days Last given 9/19/18   Quantity:  300 mg   Refills:  0       polyethylene glycol Packet   Commonly known as:  MIRALAX/GLYCOLAX   Used for:  Constipation, unspecified constipation type        Dose:  17 g   Take 17 g by mouth daily as needed for constipation   Quantity:  7 packet   Refills:  0       potassium chloride SA 20 MEQ CR tablet   Commonly known as:  KLOR-CON   Used for:  Heart replaced by transplant (H)        Dose:  20 mEq   Take 1 tablet (20 mEq) by mouth daily as needed for potassium supplementation   Quantity:  3 tablet   Refills:  0       predniSONE 5 MG tablet   Commonly known as:  DELTASONE   Indication:  Immunosupressant/S/p heart transplant   Used for:  Heart transplanted (H)        Dose:  5 mg   Take 1 tablet (5 mg) by mouth 2 times daily (with meals)   Quantity:  60 tablet   Refills:  0       rosuvastatin 20 MG tablet   Commonly known as:  CRESTOR   Used for:  Heart transplant recipient (H)        Dose:  20 mg   Take 1 tablet (20 mg) by mouth daily   Quantity:  30 tablet   Refills:  1       simethicone 80 MG chewable tablet   Commonly known as:  MYLICON   Used for:  Flatulence, eructation and gas pain        Dose:  80 mg   Take 1 tablet (80 mg) by  mouth every 6 hours as needed for cramping   Quantity:  180 tablet   Refills:  0       tacrolimus 1 MG capsule   Commonly known as:  GENERIC EQUIVALENT   Used for:  Heart transplanted (H)        Dose:  1 mg   Take 1 capsule (1 mg) by mouth 2 times daily   Quantity:  60 capsule   Refills:  0       traMADol 50 MG tablet   Commonly known as:  ULTRAM   Used for:  Moderate pain        Dose:  50 mg   Take 1 tablet (50 mg) by mouth every 12 hours as needed for moderate pain   Quantity:  10 tablet   Refills:  0       triamcinolone 0.1 % ointment   Commonly known as:  KENALOG   Used for:  Xerosis of skin        Apply topically 2 times daily   Quantity:  80 g   Refills:  0       Urea 40 % Crea   Used for:  Brittle nails        Externally apply topically daily   Quantity:  85 g   Refills:  1       * Notice:  This list has 2 medication(s) that are the same as other medications prescribed for you. Read the directions carefully, and ask your doctor or other care provider to review them with you.      CONTINUE these medicines which have NOT CHANGED        Dose / Directions    blood glucose monitoring lancets   Used for:  Type 2 diabetes mellitus with stage 5 chronic kidney disease not on chronic dialysis, without long-term current use of insulin (H)        Use to test blood sugar 2-3 times daily or as directed.  Ok to substitute alternative if insurance prefers.   Quantity:  102 each   Refills:  11       blood glucose monitoring test strip   Commonly known as:  no brand specified   Used for:  Type 2 diabetes mellitus with stage 5 chronic kidney disease not on chronic dialysis, without long-term current use of insulin (H)        Use to test blood sugar 2-3 times daily or as directed.   Quantity:  100 each   Refills:  11       order for DME   Used for:  CKD (chronic kidney disease) stage 5, GFR less than 15 ml/min (H)        Equipment being ordered: Oxyntix () Treatment Diagnosis: ESRD on PD Pt has to be connected to PD all  night and can not be disconnected, hence impending his mobility to go to the bathroom. At risk for infection if he does not have this equipment.   Quantity:  1 Units   Refills:  0                Protect others around you: Learn how to safely use, store and throw away your medicines at www.disposemymeds.org.         Follow-ups after your visit        Your next 10 appointments already scheduled     Oct 10, 2018  2:30 PM CDT   Heart Cath Heart Biopsy with [id number]   [facility]  [facility] ([facility], [facility])    [address] [facility] [address]   [phone]            Oct 12, 2018  7:30 AM CDT   LAB with  LAB   [facility] Lab ([facility])    [address] [place] [address]  [address]st [facility] [id number] [phone]           Please do not eat 10-12 hours before your appointment if you are coming in fasting for labs on lipids, cholesterol, or glucose (sugar). This does not apply to pregnant women. Water, hot tea and black coffee (with nothing added) are okay. Do not drink other fluids, diet soda or chew gum.            Oct 12, 2018  8:30 AM CDT   (Arrive by 8:15 AM)   Office Visit with Eduardo Lea [facility] Medication Therapy Management ([facility])    [address] [place] [address]  [address]rd [facility] [id number] [phone]           Bring a current list of meds and any records pertaining to this visit. For Physicals, please bring immunization records and any forms needing to be filled out. Please arrive 10 minutes early to complete paperwork.            Oct 15, 2018  9:30 AM CDT   (Arrive by 9:15 AM)   New Patient Visit with Rick Tran MD   [facility] Colon and Rectal Surgery ([facility])    [address] [place] [address]  [address]th [facility] [id number] [phone]            Oct 19, 2018  9:00 AM CDT   LAB with  LAB   [facility] Lab ([facility])    [address]  [address] [facility] [address]   [phone]           Please do not eat 10-12 hours before your appointment if you are coming in fasting for labs on lipids, cholesterol, or glucose (sugar). This does not apply to pregnant women. Water, hot tea and black coffee (with nothing added) are okay. Do not drink other fluids, diet soda or chew gum.            Oct 19, 2018 10:30 AM CDT   (Arrive by 10:15 AM)   New Patient Visit with Tristan Bañuelos MD   [facility] Ear Nose and Throat ([facility])    [address] [place] [address]  [address] [facility] [address]   [phone]            Oct 26, 2018  7:30 AM CDT   LAB with  LAB   [facility] Lab ([facility])    [address] [place] [address] [phone]           Please do not eat 10-12 hours before your appointment if you are coming in fasting for labs on lipids, cholesterol, or glucose (sugar). This does not apply to pregnant women. Water, hot tea and black coffee (with nothing added) are okay. Do not drink other fluids, diet soda or chew gum.            Oct 31, 2018  3:30 PM CDT   (Arrive by 3:15 PM)   Return Visit with Vinayak Lawrence MD   [facility] and Infectious Diseases ([facility])    [address] [place] [address]  [address]  [facility] [address]   [phone]            Nov 02, 2018  9:00 AM CDT   LAB with  LAB   [facility] Lab ([facility])    [address] [place] [address] [phone]           Please do not eat 10-12 hours before your appointment if you are coming in fasting for labs on lipids, cholesterol, or glucose (sugar). This does not apply to pregnant women. Water, hot tea and black coffee (with nothing added) are okay. Do not drink other fluids, diet soda or chew gum.            Nov 05, 2018  9:30 AM CST   Heart Cath Heart Biopsy with UUHCVR3    [facility],  Heart Cath Lab ([facility], [facility])    [address]  [facility] [id number] [phone]               Care Instructions        Further instructions from your care team       [facility]                        Interventional Cardiology  Discharge Instructions   Post Right Heart Cath/Endomyocardial Biopsy      AFTER YOU GO HOME:    DO drink plenty of fluids    DO resume your regular diet and medications unless otherwise instructed by your Primary Physician    Do Not scrub the procedure site vigorously    No lotion or powder to the puncture site for 3 days    CALL YOUR PRIMARY PHYSICIAN IF: You may resume all normal activity.  Monitor neck site for bleeding, swelling, or voice changes. If you notice bleeding or swelling immediately apply pressure to the site and call number below to speak with Cardiology Fellow.  If you experience any changes in your breathing you should call your doctor immediately or come to the closest Emergency Department.  Do not drive yourself.    ADDITIONAL INSTRUCTIONS: Medications: You are to resume all home medications including anticoagulation therapy unless otherwise advised by your primary cardiologist or nurse coordinator.    Follow Up: Per your primary cardiology team    If you have any questions or concerns regarding your procedure site please call [phone] at anytime and ask for Cardiology Fellow on call.  They are available 24 hours a day.  You may also contact the Cardiology Clinic after hours number at [phone].                                                       Telephone Numbers [phone] Monday-Friday 8:00 am to 4:30 pm    [phone] After 4:30 pm Monday-Friday, Weekends & Holidays  Ask for Interventional Cardiologist on call. Someone is on call 24 hours/day   [facility] toll free number [phone] Monday-Friday 8:00 am to 4:30 pm   [facility] Emergency Dept [phone]                     Additional Information About Your Visit        [facility] Information     [employer] gives you secure access to your electronic health record. If you see a primary care provider, you can also send messages to your care team and make appointments. If you have questions, please call your primary care clinic.  If you do not have a primary care provider, please call [phone] and they will assist you.        Care EveryWhere ID     This is your Care EveryWhere ID. This could be used by other organizations to access your [facility] medical records  [id number]        Your Vitals Were     Blood Pressure Pulse Temperature Respirations Pulse Oximetry       139/74 (BP Location: Right arm) 78 97.8  F (36.6  C) (Oral) 17 100%        Primary Care Provider Office Phone # Fax #    Yahir Turcios [phone]      Equal Access to Services     [facility]: Hadii marsha wang hadasho Soomaali, waaxda luqadaha, qaybta kaalmada adeegyada, jose palencia . So [place] [facility] [phone].    ATENCIÓN: Si habla español, tiene a ortiz disposición servicios gratuitos de asistencia lingüística. Keely al [phone].    We comply with applicable federal civil rights laws and [place] laws. We do not discriminate on the basis of race, color, national origin, age, disability, sex, sexual orientation, or gender identity.            Thank you!     Thank you for choosing [facility] for your care. Our goal is always to provide you with excellent care. Hearing back from our patients is one way we can continue to improve our services. Please take a few minutes to complete the written survey that you may receive in the mail after you visit with us. Thank you!             Medication List: This is a list of all your medications and when to take them. Check marks below indicate your daily home schedule. Keep this list as a reference.      Medications           Morning Afternoon Evening Bedtime As Needed    albuterol  108 (90 Base) MCG/ACT inhaler   Commonly known as:  PROAIR HFA/PROVENTIL HFA/VENTOLIN HFA   Inhale 2 puffs into the lungs every 4 hours as needed for shortness of breath / dyspnea or wheezing                                amLODIPine 10 MG tablet   Commonly known as:  NORVASC   Take 1 tablet (10 mg) by mouth daily                                aspirin 81 MG tablet   Take 1 tablet (81 mg) by mouth at bedtime                                atorvastatin 40 MG tablet   Commonly known as:  LIPITOR   Take 1 tablet (40 mg) by mouth daily                                biotin 5 MG Caps   Commonly known as:  BIOTIN 5000   Take 5 mg by mouth daily                                blood glucose monitoring lancets   Use to test blood sugar 2-3 times daily or as directed.  Ok to substitute alternative if insurance prefers.                                blood glucose monitoring test strip   Commonly known as:  no brand specified   Use to test blood sugar 2-3 times daily or as directed.                                chlorhexidine 0.12 % solution   Commonly known as:  PERIDEX   Swish and spit 15 mLs in mouth 2 times daily                                ciprofloxacin 500 MG tablet   Commonly known as:  CIPRO   Take 1 tablet (500 mg) by mouth every 24 hours                                CLONIDINE HCL PO   Take 0.1 mg by mouth At Bedtime                                clotrimazole 10 MG kalpana   Place 1 Kalpana (10 mg) inside cheek 4 times daily                                fluticasone 50 MCG/ACT spray   Commonly known as:  FLONASE   Spray 1-2 sprays into both nostrils daily                                hydrALAZINE 100 MG Tabs tablet   Commonly known as:  APRESOLINE   Take 1 tablet (100 mg) by mouth every 8 hours                                insulin aspart 100 UNIT/ML injection   Commonly known as:  NovoLOG PEN   Inject 1-7 Units Subcutaneous 4 times daily (before meals and nightly)                                * insulin  isophane human 100 UNIT/ML injection   Commonly known as:  HumuLIN N PEN   Inject 16 Units Subcutaneous every morning (before breakfast)                                * insulin isophane human 100 UNIT/ML injection   Commonly known as:  HumuLIN N PEN   Inject 12 Units Subcutaneous daily (with dinner)                                lisinopril 5 MG tablet   Commonly known as:  PRINIVIL/ZESTRIL   Take 1 tablet (5 mg) by mouth daily                                loratadine 10 MG tablet   Commonly known as:  CLARITIN   Take 10 mg by mouth daily Reported on 5/3/2017                                melatonin 1 MG Tabs tablet   Take 2 tablets (2 mg) by mouth nightly as needed for sleep                                NEPHROCAPS 1 MG capsule   Take 1 capsule by mouth daily                                order for DME   Equipment being ordered: Carol () Treatment Diagnosis: ESRD on PD Pt has to be connected to PD all night and can not be disconnected, hence impending his mobility to go to the bathroom. At risk for infection if he does not have this equipment.                                pantoprazole 40 MG EC tablet   Commonly known as:  PROTONIX   Take 1 tablet (40 mg) by mouth 2 times daily (before meals)                                pentamidine 300 MG neb solution   Commonly known as:  NEBUPENT   Inhale 300 mg into the lungs every 28 days Last given 9/19/18                                polyethylene glycol Packet   Commonly known as:  MIRALAX/GLYCOLAX   Take 17 g by mouth daily as needed for constipation                                potassium chloride SA 20 MEQ CR tablet   Commonly known as:  KLOR-CON   Take 1 tablet (20 mEq) by mouth daily as needed for potassium supplementation                                predniSONE 5 MG tablet   Commonly known as:  DELTASONE   Take 1 tablet (5 mg) by mouth 2 times daily (with meals)                                rosuvastatin 20 MG tablet   Commonly known as:  CRESTOR    Take 1 tablet (20 mg) by mouth daily                                simethicone 80 MG chewable tablet   Commonly known as:  MYLICON   Take 1 tablet (80 mg) by mouth every 6 hours as needed for cramping                                tacrolimus 1 MG capsule   Commonly known as:  GENERIC EQUIVALENT   Take 1 capsule (1 mg) by mouth 2 times daily                                traMADol 50 MG tablet   Commonly known as:  ULTRAM   Take 1 tablet (50 mg) by mouth every 12 hours as needed for moderate pain                                triamcinolone 0.1 % ointment   Commonly known as:  KENALOG   Apply topically 2 times daily                                Urea 40 % Crea   Externally apply topically daily                                * Notice:  This list has 2 medication(s) that are the same as other medications prescribed for you. Read the directions carefully, and ask your doctor or other care provider to review them with you.

## 2018-10-11 LAB
BACTERIA SPEC CULT: NO GROWTH
BACTERIA SPEC CULT: NO GROWTH
COPATH REPORT: NORMAL
EBV DNA # SPEC NAA+PROBE: NORMAL {COPIES}/ML
EBV DNA SPEC NAA+PROBE-LOG#: NORMAL {LOG_COPIES}/ML
Lab: NORMAL
Lab: NORMAL
SPECIMEN SOURCE: NORMAL
SPECIMEN SOURCE: NORMAL

## 2018-10-11 NOTE — PROGRESS NOTES
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    INTERVAL HISTORY: Murray is feeling much better. Denies increased pain, fevers, or chills. Is currently on Cipro for an ENT infection. Tolerating diet well. Colostomy functioning well. Denies pouching issues. Continues on hemodialysis. He is currently listed for kidney transplantation. Heart TX allograft function seems to be great with no evidence of rejection. Currently on ASA but Eliquis was discontinued by Dr. Ernst.    Physical Examination:  /80 (BP Location: Left arm, Patient Position: Chair, Cuff Size: Adult Regular)  Pulse 81  Temp 97.9  F (36.6  C) (Oral)  Ht 5' 9\"  Wt 177 lb 4.8 oz  SpO2 100%  BMI 26.18 kg/m2  Abdomen soft, NT. Left-sided colostomy viable and functioning. Incisions with no evidence of infection or hernia.  Perianal skin intact. Right posterior I&D site with no evidence of infection or fistula. Deferred CHRISTINA because of pain.      ASSESSMENT  Doing well postop.    Pathology:  FINAL DIAGNOSIS:   A. SMALL BOWEL WITH FISTULA, RESECTION:   - Small bowel wall with fistulous tract lined by inflamed granulation   tissue   - Margins viable     B. SIGMOID COLON, SIGMOIDECTOMY:   - Colonic wall with diverticulosis and associated perforations lined by   acute and chronic inflammation,   foreign body giant cell reaction and granulation tissue formation   - Margins viable     Colonoscopy in 5/3/18:  Findings:        A few small-mouthed diverticula were found in the sigmoid colon.        The exam was otherwise without abnormality.     PLAN  1. Low fiber diet.  2. Routine stoma cares.  3. Labs this week.  4. CT a/p with IV, PO, and rectal contrast; and Flex Sig at unit J at the end of November.  5. RTC in early " December to further discuss ostomy takedown.  6. Start looking for a date for laparoscopic colostomy takedown, flex sig and possible DLI in mid December. Will need PAC, Labs, and MBP w/Abx.     Time spent: 30 minutes.  >50% spent in discussing, counseling and coordinating care.    Rick Tran M.D., M.Sc.     Division of Colon and Rectal Surgery  [facility]    Referring Provider:  Klevre Ernst MD     Primary Care Provider:  Yahir Turcios

## 2018-10-12 ENCOUNTER — OFFICE VISIT (OUTPATIENT)
Dept: PHARMACY | Facility: CLINIC | Age: 63
End: 2018-10-12
Payer: COMMERCIAL

## 2018-10-12 VITALS — DIASTOLIC BLOOD PRESSURE: 77 MMHG | HEART RATE: 80 BPM | SYSTOLIC BLOOD PRESSURE: 123 MMHG

## 2018-10-12 DIAGNOSIS — N18.5 TYPE 2 DIABETES MELLITUS WITH STAGE 5 CHRONIC KIDNEY DISEASE NOT ON CHRONIC DIALYSIS, WITHOUT LONG-TERM CURRENT USE OF INSULIN (H): Primary | ICD-10-CM

## 2018-10-12 DIAGNOSIS — Z94.1 HEART REPLACED BY TRANSPLANT (H): ICD-10-CM

## 2018-10-12 DIAGNOSIS — Z94.1 HEART TRANSPLANTED (H): ICD-10-CM

## 2018-10-12 DIAGNOSIS — N18.5 CKD (CHRONIC KIDNEY DISEASE) STAGE 5, GFR LESS THAN 15 ML/MIN (H): ICD-10-CM

## 2018-10-12 DIAGNOSIS — I15.1 HYPERTENSION SECONDARY TO OTHER RENAL DISORDERS: ICD-10-CM

## 2018-10-12 DIAGNOSIS — E11.22 TYPE 2 DIABETES MELLITUS WITH STAGE 5 CHRONIC KIDNEY DISEASE NOT ON CHRONIC DIALYSIS, WITHOUT LONG-TERM CURRENT USE OF INSULIN (H): Primary | ICD-10-CM

## 2018-10-12 LAB
ALLOMAP SCORE (EXTERNAL): 34
ANION GAP SERPL CALCULATED.3IONS-SCNC: 7 MMOL/L (ref 3–14)
BUN SERPL-MCNC: 21 MG/DL (ref 7–30)
CALCIUM SERPL-MCNC: 8.7 MG/DL (ref 8.5–10.1)
CHLORIDE SERPL-SCNC: 101 MMOL/L (ref 94–109)
CMV DNA SPEC NAA+PROBE-ACNC: NORMAL [IU]/ML
CMV DNA SPEC NAA+PROBE-LOG#: NORMAL {LOG_IU}/ML
CO2 SERPL-SCNC: 26 MMOL/L (ref 20–32)
CREAT SERPL-MCNC: 3.55 MG/DL (ref 0.66–1.25)
CRP SERPL-MCNC: 19.3 MG/L (ref 0–8)
ERYTHROCYTE [DISTWIDTH] IN BLOOD BY AUTOMATED COUNT: 19.7 % (ref 10–15)
GFR SERPL CREATININE-BSD FRML MDRD: 17 ML/MIN/1.7M2
GLUCOSE SERPL-MCNC: 134 MG/DL (ref 70–99)
HCT VFR BLD AUTO: 29.8 % (ref 40–53)
HGB BLD-MCNC: 9.1 G/DL (ref 13.3–17.7)
LAB SCANNED RESULT: NORMAL
MAGNESIUM SERPL-MCNC: 1.6 MG/DL (ref 1.6–2.3)
MCH RBC QN AUTO: 29.7 PG (ref 26.5–33)
MCHC RBC AUTO-ENTMCNC: 30.5 G/DL (ref 31.5–36.5)
MCV RBC AUTO: 97 FL (ref 78–100)
NEGATIVE PREDICTIVE VALUE PERCENT (EXTERNAL): 98.2 %
PHOSPHATE SERPL-MCNC: 1.2 MG/DL (ref 2.5–4.5)
PLATELET # BLD AUTO: 308 10E9/L (ref 150–450)
POSITIVE PREDICTIVE VALUE PERCENT (EXTERNAL): 5 %
POTASSIUM SERPL-SCNC: 4.2 MMOL/L (ref 3.4–5.3)
RBC # BLD AUTO: 3.06 10E12/L (ref 4.4–5.9)
SODIUM SERPL-SCNC: 134 MMOL/L (ref 133–144)
SPECIMEN SOURCE: NORMAL
TACROLIMUS BLD-MCNC: 6.5 UG/L (ref 5–15)
TME LAST DOSE: NORMAL H
WBC # BLD AUTO: 10 10E9/L (ref 4–11)

## 2018-10-12 PROCEDURE — 99607 MTMS BY PHARM ADDL 15 MIN: CPT | Performed by: PHARMACIST

## 2018-10-12 PROCEDURE — 84100 ASSAY OF PHOSPHORUS: CPT

## 2018-10-12 PROCEDURE — 99606 MTMS BY PHARM EST 15 MIN: CPT | Performed by: PHARMACIST

## 2018-10-12 PROCEDURE — 86140 C-REACTIVE PROTEIN: CPT

## 2018-10-12 PROCEDURE — 80048 BASIC METABOLIC PNL TOTAL CA: CPT | Mod: AY

## 2018-10-12 PROCEDURE — 80197 ASSAY OF TACROLIMUS: CPT | Performed by: INTERNAL MEDICINE

## 2018-10-12 PROCEDURE — 83735 ASSAY OF MAGNESIUM: CPT | Mod: AY

## 2018-10-12 PROCEDURE — 85027 COMPLETE CBC AUTOMATED: CPT

## 2018-10-12 NOTE — MR AVS SNAPSHOT
After Visit Summary   10/12/2018    Murray Nicholson    MRN: [id number]           Patient Information     Date Of Birth          1955        Visit Information        Provider Department      10/12/2018 8:30 AM Eduardo Lea [facility] Medication Therapy Management        Care Instructions    Recommendations from today's MTM visit:                                                      1. Start checking your blood sugars before meals and giving your Novolog sliding scale before these meals.     Novolog Sliding scale  140-189: 1 unit  190-239: 2 units  240-289: 3 units   290-339: 4 units  340-399: 5 units  400-449: 6 units    2. Reduce your evening NPH insulin to 5 units.     3. Continue your immunosuppressant medications exactly at 8am and 8pm, 12 hours apart. We want to make sure your heart is fully covered throughout the entire day.     Next MTM visit: 10/26 at 9 AM    To schedule another MTM appointment, please call the clinic directly or you may call the MTM scheduling line at [phone] or toll-free at [phone].     My Clinical Pharmacist's contact information:                                                      It was a pleasure seeing you today!  Please feel free to contact me with any questions or concerns you have.      Eduardo Lea, PharmD  MTM Pharmacist    Phone: [phone]     You may receive a survey about the MT services you received.  I would appreciate your feedback to help me serve you better in the future. Please fill it out and return it when you can. Your comments will be anonymous.              Follow-ups after your visit        Your next 10 appointments already scheduled     Oct 15, 2018  9:30 AM CDT   (Arrive by 9:15 AM)   New Patient Visit with Rick Tran MD   [facility] Colon and Rectal Surgery ([facility])    [address] [place] [id number] [phone]            Oct 19, 2018  9:00 AM CDT    LAB with  LAB   [facility] Lab ([facility])    [address]  [facility] [address]   [phone]           Please do not eat 10-12 hours before your appointment if you are coming in fasting for labs on lipids, cholesterol, or glucose (sugar). This does not apply to pregnant women. Water, hot tea and black coffee (with nothing added) are okay. Do not drink other fluids, diet soda or chew gum.            Oct 19, 2018 10:30 AM CDT   (Arrive by 10:15 AM)   New Patient Visit with Tristan Bañuelos MD   [facility] Ear Nose and Throat ([facility])    [address]  4th Floor  [facility] [address]   [phone]            Oct 26, 2018  7:30 AM CDT   LAB with  LAB   [facility] ([facility])    [address] [place] [address]  [facility] [id number] [phone]           Please do not eat 10-12 hours before your appointment if you are coming in fasting for labs on lipids, cholesterol, or glucose (sugar). This does not apply to pregnant women. Water, hot tea and black coffee (with nothing added) are okay. Do not drink other fluids, diet soda or chew gum.            Oct 26, 2018  9:00 AM CDT   (Arrive by 8:45 AM)   SHORT with Eduardo Lea ASTRID   [facility] Medication Therapy Management ([facility])    [address] [place] [address]  3rd Floor  [facility] [address]   [phone]            Oct 31, 2018  3:30 PM CDT   (Arrive by 3:15 PM)   Return Visit with Vniayak Lawrence MD   [facility] and Infectious Diseases ([facility])    [address] [place] [address]  [address]  [facility] [address]   [phone]            Nov 02, 2018  9:00 AM CDT   LAB with  LAB   [facility] Lab ([facility])    [address] [place] [address]  [address] [address]   [phone]           Please do not eat 10-12 hours before your  appointment if you are coming in fasting for labs on lipids, cholesterol, or glucose (sugar). This does not apply to pregnant women. Water, hot tea and black coffee (with nothing added) are okay. Do not drink other fluids, diet soda or chew gum.            Nov 05, 2018  8:00 AM CST   LAB with UU LAB GOLD WAITING   [facility] ([facility])    [address] [facility] [address]              Please do not eat 10-12 hours before your appointment if you are coming in fasting for labs on lipids, cholesterol, or glucose (sugar). This does not apply to pregnant women. Water, hot tea and black coffee (with nothing added) are okay. Do not drink other fluids, diet soda or chew gum.            Nov 05, 2018  8:30 AM CST   Procedure - 2.5 hour with U2A ROOM 17   [address]A [facility] ([facility])    [address] [facility] [address]               Nov 05, 2018  9:30 AM CST   Heart Cath Heart Biopsy with [id number]   [facility] ([facility])    [address] [address]   [phone]              Who to contact     If you have questions or need follow up information about today's clinic visit or your schedule please contact [facility] MEDICATION THERAPY MANAGEMENT directly at [phone].  Normal or non-critical lab and imaging results will be communicated to you by MyChart, letter or phone within 4 business days after the clinic has received the results. If you do not hear from us within 7 days, please contact the clinic through MyChart or phone. If you have a critical or abnormal lab result, we will notify you by phone as soon as possible.  Submit refill requests through [email or web address] or call your pharmacy and they will forward the refill request to us. Please allow 3 business days for your refill to be completed.          Additional Information About  Your Visit        Rocket[employer] Information     [employer] gives you secure access to your electronic health record. If you see a primary care provider, you can also send messages to your care team and make appointments. If you have questions, please call your primary care clinic.  If you do not have a primary care provider, please call [phone] and they will assist you.        Care EveryWhere ID     This is your Care EveryWhere ID. This could be used by other organizations to access your [place] medical records  [id number]         Blood Pressure from Last 3 Encounters:   10/10/18 (!) 168/92   10/05/18 120/77   10/03/18 108/74    Weight from Last 3 Encounters:   10/10/18 170 lb (77.1 kg)   10/03/18 171 lb 6.4 oz (77.7 kg)   09/26/18 165 lb 14.4 oz (75.3 kg)              Today, you had the following     No orders found for display       Primary Care Provider Office Phone # Fax #    Yahir Alex Turcios [phone]       [address]  [facility] [address]        Goals        Lifestyle    Increase physical activity     Notes - Note created  7/3/2018  1:51 PM by Carrie Tran, RN    Goal Statement: I will start cardiac rehab  Measure of Success: starts cardiac rehab  Supportive Steps to Achieve: support of RN CC  Barriers: none  Strengths: willingness to participate   Date to Achieve By: 7-15-18          Equal Access to Services     [facility]: Hadii aad ku hadasho Soomaali, waaxda luqadaha, qaybta kaalmada adeegyada, jose thompson haygary palencia . So [facility] [phone].    ATENCIÓN: Si habla español, tiene a ortiz disposición servicios gratuitos de asistencia lingüística. Llame al [phone].    We comply with applicable federal civil rights laws and [place] laws. We do not discriminate on the basis of race, color, national origin, age, disability, sex, sexual orientation, or gender identity.            Thank you!     Thank you for choosing [facility] MEDICATION THERAPY MANAGEMENT  for your  care. Our goal is always to provide you with excellent care. Hearing back from our patients is one way we can continue to improve our services. Please take a few minutes to complete the written survey that you may receive in the mail after your visit with us. Thank you!             Your Updated Medication List - Protect others around you: Learn how to safely use, store and throw away your medicines at www.disposemymeds.org.          This list is accurate as of 10/12/18  8:56 AM.  Always use your most recent med list.                   Brand Name Dispense Instructions for use Diagnosis    albuterol 108 (90 Base) MCG/ACT inhaler    PROAIR HFA/PROVENTIL HFA/VENTOLIN HFA     Inhale 2 puffs into the lungs every 4 hours as needed for shortness of breath / dyspnea or wheezing        amLODIPine 10 MG tablet    NORVASC    30 tablet    Take 1 tablet (10 mg) by mouth daily    Hypertension goal BP (blood pressure) < 130/80       aspirin 81 MG tablet      Take 1 tablet (81 mg) by mouth at bedtime        atorvastatin 40 MG tablet    LIPITOR    90 tablet    Take 1 tablet (40 mg) by mouth daily    Heart replaced by transplant (H)       biotin 5 MG Caps    BIOTIN 5000    30 capsule    Take 5 mg by mouth daily    Brittle nails       blood glucose monitoring lancets     102 each    Use to test blood sugar 2-3 times daily or as directed.  Ok to substitute alternative if insurance prefers.    Type 2 diabetes mellitus with stage 5 chronic kidney disease not on chronic dialysis, without long-term current use of insulin (H)       blood glucose monitoring test strip    no brand specified    100 each    Use to test blood sugar 2-3 times daily or as directed.    Type 2 diabetes mellitus with stage 5 chronic kidney disease not on chronic dialysis, without long-term current use of insulin (H)       chlorhexidine 0.12 % solution    PERIDEX    900 mL    Swish and spit 15 mLs in mouth 2 times daily    Oral ulceration       ciprofloxacin 500 MG  tablet    CIPRO    14 tablet    Take 1 tablet (500 mg) by mouth every 24 hours    Oral ulceration       CLONIDINE HCL PO      Take 0.1 mg by mouth At Bedtime        clotrimazole 10 MG kalpana     120 Kalpana    Place 1 Kalpana (10 mg) inside cheek 4 times daily    Candidiasis of mouth       fluticasone 50 MCG/ACT spray    FLONASE    16 g    Spray 1-2 sprays into both nostrils daily    Nasal congestion       hydrALAZINE 100 MG Tabs tablet    APRESOLINE    60 tablet    Take 1 tablet (100 mg) by mouth every 8 hours    Essential hypertension       insulin aspart 100 UNIT/ML injection    NovoLOG PEN    9 mL    Inject 1-7 Units Subcutaneous 4 times daily (before meals and nightly)    Type 2 diabetes mellitus with diabetic nephropathy, with long-term current use of insulin (H)       * insulin isophane human 100 UNIT/ML injection    HumuLIN N PEN    6 mL    Inject 16 Units Subcutaneous every morning (before breakfast)    Type 2 diabetes mellitus with diabetic nephropathy, with long-term current use of insulin (H)       * insulin isophane human 100 UNIT/ML injection    HumuLIN N PEN    3 mL    Inject 12 Units Subcutaneous daily (with dinner)    Type 2 diabetes mellitus with diabetic nephropathy, with long-term current use of insulin (H)       lisinopril 5 MG tablet    PRINIVIL/ZESTRIL    30 tablet    Take 1 tablet (5 mg) by mouth daily    Hypertension goal BP (blood pressure) < 130/80       loratadine 10 MG tablet    CLARITIN     Take 10 mg by mouth daily Reported on 5/3/2017    Hypertensive cardiopathy, SOB (shortness of breath)       melatonin 1 MG Tabs tablet     120 tablet    Take 2 tablets (2 mg) by mouth nightly as needed for sleep    Heart transplanted (H)       NEPHROCAPS 1 MG capsule     120 capsule    Take 1 capsule by mouth daily        order for DME     1 Units    Equipment being ordered: Commode () Treatment Diagnosis: ESRD on PD Pt has to be connected to PD all night and can not be disconnected, hence  impending his mobility to go to the bathroom. At risk for infection if he does not have this equipment.    CKD (chronic kidney disease) stage 5, GFR less than 15 ml/min (H)       pantoprazole 40 MG EC tablet    PROTONIX    60 tablet    Take 1 tablet (40 mg) by mouth 2 times daily (before meals)    Duodenitis       pentamidine 300 MG neb solution    NEBUPENT    300 mg    Inhale 300 mg into the lungs every 28 days Last given 9/19/18    Heart replaced by transplant (H)       polyethylene glycol Packet    MIRALAX/GLYCOLAX    7 packet    Take 17 g by mouth daily as needed for constipation    Constipation, unspecified constipation type       potassium chloride SA 20 MEQ CR tablet    KLOR-CON    3 tablet    Take 1 tablet (20 mEq) by mouth daily as needed for potassium supplementation    Heart replaced by transplant (H)       predniSONE 5 MG tablet    DELTASONE    60 tablet    Take 1 tablet (5 mg) by mouth 2 times daily (with meals)    Heart transplanted (H)       rosuvastatin 20 MG tablet    CRESTOR    30 tablet    Take 1 tablet (20 mg) by mouth daily    Heart transplant recipient (H)       simethicone 80 MG chewable tablet    MYLICON    180 tablet    Take 1 tablet (80 mg) by mouth every 6 hours as needed for cramping    Flatulence, eructation and gas pain       tacrolimus 1 MG capsule    GENERIC EQUIVALENT    60 capsule    Take 1 capsule (1 mg) by mouth 2 times daily    Heart transplanted (H)       traMADol 50 MG tablet    ULTRAM    10 tablet    Take 1 tablet (50 mg) by mouth every 12 hours as needed for moderate pain    Moderate pain       triamcinolone 0.1 % ointment    KENALOG    80 g    Apply topically 2 times daily    Xerosis of skin       Urea 40 % Crea     85 g    Externally apply topically daily    Brittle nails       * Notice:  This list has 2 medication(s) that are the same as other medications prescribed for you. Read the directions carefully, and ask your doctor or other care provider to review them with you.

## 2018-10-12 NOTE — PATIENT INSTRUCTIONS
Recommendations from today's MTM visit:                                                      1. Start checking your blood sugars before meals and giving your Novolog sliding scale before these meals.     Novolog Sliding scale  140-189: 1 unit  190-239: 2 units  240-289: 3 units   290-339: 4 units  340-399: 5 units  400-449: 6 units    2. Reduce your evening NPH insulin to 5 units.     3. Continue your immunosuppressant medications exactly at 8am and 8pm, 12 hours apart. We want to make sure your heart is fully covered throughout the entire day.     Next MTM visit: 10/26 at 9 AM    To schedule another MTM appointment, please call the clinic directly or you may call the MTM scheduling line at [phone] or toll-free at [phone].     My Clinical Pharmacist's contact information:                                                      It was a pleasure seeing you today!  Please feel free to contact me with any questions or concerns you have.      Eduardo Lea, PharmD  MTM Pharmacist    Phone: [phone]     You may receive a survey about the MTM services you received.  I would appreciate your feedback to help me serve you better in the future. Please fill it out and return it when you can. Your comments will be anonymous.

## 2018-10-12 NOTE — PROGRESS NOTES
SUBJECTIVE/OBJECTIVE:                Murray Nicholson is a 63 year old male coming in for a follow-up visit for Medication Therapy Management.  He was referred to me from txp team.     Chief Complaint: Sometimes he does miss taking his blood sugar before dinner. Takes NPH late, this causes low blood sugars in the morning.  States he takes his Immunos on time at 8 PM though.     Tobacco: No tobacco use  Alcohol: not currently using    Medication Adherence/Access:  Patient uses pill box(es).  Patient takes medications 4 time(s) per day. 7-7:30am, 12pm, 4-6pm, 8pm.   Per patient, misses medication 0 times per week. Sometimes takes evening doses around 10 pm vs. 8pm.   The patient fills medications at [facility]: YES.    Heart Transplant:  Current immunosuppressants include TAC 1mg BID, prednisone 5mg BID. Pt complains of tremor.  Transplant date: 6/14/18  Estimated Creatinine Clearance: 11.8 mL/min (based on Cr of 7.09).  CMV prophylaxis: CMV mismatch, Valcyte was stopped due to neutropenia. WBC currently WNL  PCP prophylaxis: Bactrim holding due to neutropenia. Receiving Pentamidine inhalations monthly.   Antifungal Prophylaxis: Nystatin until off steroids. QID.   PPI use: Pantoprazole 40mg BID. Denies GERD sx, Duodenitis  Current supplements for electrolyte replacement:  Nephrocaps  Tx Coordinator: Bob Ulloa MD: [facility], Using Med Card: Yes  Recent Infections:  Concerns about a post OP infection. Pt is getting Vancomycin IV after Dialysis sessions.   Recent Hospitalizations: recent txp  Home BPs: See htn     Hypertension: Current medications include hydralazine 100mg TID, Amlodipine 10mg daily, clonidine 0.1mg at bedtime, Lisinopril 5mg daily, patient was prescribed potassium, but never picked it up. Now is potassium is back to normal.   Patient does self-monitor BP. 135/85 HR 73, 159/92 HR82, 131/79, 137/86, 138/84, 127/78  BP Readings from Last 3 Encounters:   10/10/18 (!) 168/92   10/05/18 120/77   10/03/18 108/74     Patient reports no current medication side effects.    ESRD: Dialysis Tues, Th, Sat. Pt is taking Ciprofloxacin 500mg qPM as directed (after dialysis) for oral ulceration per chart.      Diabetes:  Pt currently taking NPH  24 units units qAM, 7 units qevening.  Has Novolog Sliding scale as well. Has been skipping NPH dose when BG are <100.   Novolog Sliding scale  140-189: 1 unit  190-239: 2 units  240-289: 3 units  290-339: 4 units  340-399: 5 units  400-449: 6 units  Over 450: 7 units and contact me  SMBG: two times daily.   Ranges (patient reported):   Date FBG/ 2hours post Dinner /2hours post   10/12 126    10/11 91 /234   10/10 134 /172   10/9 94 /277   10/8 91 /181   10/7 77 /71   10/6 74 /269     Date FBG/ 2hours post Dinner /2hours post   10/5 92 (skipped NPH due to this    10/4 79 153   10/3 102 206   10/2 73 223   10/1 124 147   9/30 116 204   9/29 109 199   9/28 92 200   9/27 88       Patient is experiencing mild hypoglycemia in the mornings, but no sx.   Recent symptoms of high blood sugar? none  Diet/Exercise: Eating around meals a day. Spinach, potato, rotisserie chicken, Carrots dinner. Low sodium     Today's Vitals: There were no vitals taken for this visit.      ASSESSMENT:              Current medications were reviewed today as discussed above.      Medication Adherence: fair, improved since last visit. Encouraged continued compliance at 8 am and 8pm for immunos.     Heart Transplant:  Stable.     Hypertension: Stable. BP at goal <130/80 today.     ESRD: Stable.     Diabetes: Needs improvement. Pt has not been checking before meals and administering sliding scale. Recommended he start checking TID before meals and taking Novolog prn. Reduce evening NPH to 5 units as his AM fasting BG has bee running mostly in the low 90s.    PLAN:                  Pt to...  1. Start checking your blood sugars before meals and giving your Novolog sliding scale before these meals.   Novolog Sliding scale  140-189: 1  unit  190-239: 2 units  240-289: 3 units   290-339: 4 units  340-399: 5 units  400-449: 6 units  2. Reduce your evening NPH insulin to 5 units.   3. Continue your immunosuppressant medications exactly at 8am and 8pm, 12 hours apart. We want to make sure your heart is fully covered throughout the entire day.     I spent 30 minutes with this patient today. I offer these suggestions for consideration by txp team. A copy of the visit note was provided to the patient's txp provider.     Will follow up in 2 weeks.    The patient was given a summary of these recommendations as an after visit summary.    Eduardo Lea, PharmD  [facility] Pharmacist    Phone: [phone]

## 2018-10-15 ENCOUNTER — OFFICE VISIT (OUTPATIENT)
Dept: SURGERY | Facility: CLINIC | Age: 63
End: 2018-10-15
Payer: MEDICARE

## 2018-10-15 ENCOUNTER — DOCUMENTATION ONLY (OUTPATIENT)
Dept: TRANSPLANT | Facility: CLINIC | Age: 63
End: 2018-10-15

## 2018-10-15 ENCOUNTER — TELEPHONE (OUTPATIENT)
Dept: TRANSPLANT | Facility: CLINIC | Age: 63
End: 2018-10-15

## 2018-10-15 VITALS
TEMPERATURE: 97.9 F | HEART RATE: 81 BPM | HEIGHT: 69 IN | WEIGHT: 177.3 LBS | OXYGEN SATURATION: 100 % | DIASTOLIC BLOOD PRESSURE: 80 MMHG | BODY MASS INDEX: 26.26 KG/M2 | SYSTOLIC BLOOD PRESSURE: 127 MMHG

## 2018-10-15 DIAGNOSIS — K57.32 DIVERTICULITIS OF COLON: Primary | ICD-10-CM

## 2018-10-15 DIAGNOSIS — Z94.1 HEART REPLACED BY TRANSPLANT (H): Primary | ICD-10-CM

## 2018-10-15 DIAGNOSIS — Z94.1 HEART TRANSPLANTED (H): ICD-10-CM

## 2018-10-15 DIAGNOSIS — B17.0: ICD-10-CM

## 2018-10-15 RX ORDER — MYCOPHENOLATE MOFETIL 250 MG/1
250 CAPSULE ORAL 2 TIMES DAILY
Qty: 60 CAPSULE | Refills: 11 | Status: SHIPPED | OUTPATIENT
Start: 2018-10-15 | End: 2018-10-19

## 2018-10-15 RX ORDER — PREDNISONE 5 MG/1
5 TABLET ORAL DAILY
Qty: 60 TABLET | Refills: 0
Start: 2018-10-15 | End: 2018-12-28

## 2018-10-15 ASSESSMENT — ENCOUNTER SYMPTOMS
ABDOMINAL PAIN: 0
RECTAL PAIN: 0
NAUSEA: 0
SINUS PAIN: 0
SINUS CONGESTION: 0
BLOOD IN STOOL: 0
SLEEP DISTURBANCES DUE TO BREATHING: 0
HOARSE VOICE: 0
LIGHT-HEADEDNESS: 0
JAUNDICE: 0
LEG PAIN: 0
EXERCISE INTOLERANCE: 1
DIARRHEA: 0
VOMITING: 0
SMELL DISTURBANCE: 0
SORE THROAT: 0
CONSTIPATION: 0
HEARTBURN: 0
HYPOTENSION: 0
SYNCOPE: 0
TASTE DISTURBANCE: 0
NECK MASS: 0
PALPITATIONS: 0
TROUBLE SWALLOWING: 0
BOWEL INCONTINENCE: 0
ORTHOPNEA: 0
HYPERTENSION: 1
BLOATING: 0

## 2018-10-15 ASSESSMENT — PAIN SCALES - GENERAL: PAINLEVEL: MILD PAIN (2)

## 2018-10-15 NOTE — LETTER
"10/15/2018       RE: Murray Nicholson  [address]  [place] [address]     Dear Colleague,    Thank you for referring your patient, Murray Nicholson, to the [facility] COLON AND RECTAL SURGERY at [facility]. Please see a copy of my visit note below.    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    INTERVAL HISTORY: Murray is feeling much better. Denies increased pain, fevers, or chills. Is currently on Cipro for an ENT infection. Tolerating diet well. Colostomy functioning well. Denies pouching issues. Continues on hemodialysis. He is currently listed for kidney transplantation. Heart TX allograft function seems to be great with no evidence of rejection. Currently on ASA but Eliquis was discontinued by Dr. Ernst.    Physical Examination:  /80 (BP Location: Left arm, Patient Position: Chair, Cuff Size: Adult Regular)  Pulse 81  Temp 97.9  F (36.6  C) (Oral)  Ht 5' 9\"  Wt 177 lb 4.8 oz  SpO2 100%  BMI 26.18 kg/m2  Abdomen soft, NT. Left-sided colostomy viable and functioning. Incisions with no evidence of infection or hernia.  Perianal skin intact. Right posterior I&D site with no evidence of infection or fistula. Deferred CHRISTINA because of pain.      ASSESSMENT  Doing well postop.    Pathology:  FINAL DIAGNOSIS:   A. SMALL BOWEL WITH FISTULA, RESECTION:   - Small bowel wall with fistulous tract lined by inflamed granulation   tissue   - Margins viable     B. SIGMOID COLON, SIGMOIDECTOMY:   - Colonic wall with diverticulosis and associated perforations lined by   acute and chronic inflammation,   foreign body giant cell reaction and granulation tissue formation   - Margins viable     Colonoscopy in 5/3/18:  Findings:        A few " small-mouthed diverticula were found in the sigmoid colon.        The exam was otherwise without abnormality.     PLAN  1. Low fiber diet.  2. Routine stoma cares.  3. Labs this week.  4. CT a/p with IV, PO, and rectal contrast; and Flex Sig at unit J at the end of November.  5. RTC in early December to further discuss ostomy takedown.  6. Start looking for a date for laparoscopic colostomy takedown, flex sig and possible DLI in mid December. Will need PAC, Labs, and MBP w/Abx.     Time spent: 30 minutes.  >50% spent in discussing, counseling and coordinating care.    Referring Provider:  Klever Ernst MD     Primary Care Provider:  Yahir Turcios      Again, thank you for allowing me to participate in the care of your patient.      Sincerely,    Rick Tran MD

## 2018-10-15 NOTE — MR AVS SNAPSHOT
After Visit Summary   10/15/2018    Murray Nicholson    MRN: [id number]           Patient Information     Date Of Birth          1955        Visit Information        Provider Department      10/15/2018 9:30 AM Rick Tran MD [facility] Colon and Rectal Surgery        Today's Diagnoses     Diverticulitis of colon    -  1    Acute delta infection of hepatitis B carrier            Follow-ups after your visit        Additional Services     PAC Visit Referral (For [facility] Only)           PAC Visit Referral (For [facility] Only)                 Your next 10 appointments already scheduled     Oct 19, 2018  9:00 AM CDT   LAB with  LAB   [facility] Lab ([facility])    [address]  [address] [facility] [id number] [phone]           Please do not eat 10-12 hours before your appointment if you are coming in fasting for labs on lipids, cholesterol, or glucose (sugar). This does not apply to pregnant women. Water, hot tea and black coffee (with nothing added) are okay. Do not drink other fluids, diet soda or chew gum.            Oct 19, 2018 10:30 AM CDT   (Arrive by 10:15 AM)   New Patient Visit with Tristan Bañuelos MD   [facility] Ear Nose and Throat ([facility])    [address]  [address] [facility] [id number] [phone]            Oct 26, 2018  7:30 AM CDT   LAB with  LAB   [facility] ([facility])    [address] [id number] [phone]           Please do not eat 10-12 hours before your appointment if you are coming in fasting for labs on lipids, cholesterol, or glucose (sugar). This does not apply to pregnant women. Water, hot tea and black coffee (with nothing added) are okay. Do not drink other fluids, diet soda or chew gum.            Oct 26, 2018  9:00 AM CDT   (Arrive by 8:45 AM)   [facility] ([facility])    [address]  3rd Floor  [facility] [address]   [phone]            Oct 31, 2018  3:30 PM CDT   (Arrive by 3:15 PM)   Return Visit with Vinayak Lawrence MD   [facility] and Infectious Diseases ([facility])    [address]  [address]  [facility] [address]   [phone]            Nov 02, 2018  9:00 AM CDT   LAB with  LAB   [facility] Lab ([facility])    [address]  1st Floor  [facility] [address]   [phone]           Please do not eat 10-12 hours before your appointment if you are coming in fasting for labs on lipids, cholesterol, or glucose (sugar). This does not apply to pregnant women. Water, hot tea and black coffee (with nothing added) are okay. Do not drink other fluids, diet soda or chew gum.            Nov 05, 2018  8:00 AM CST   LAB with UU LAB GOLD WAITING   [facility] ([facility])    [address] [address]              Please do not eat 10-12 hours before your appointment if you are coming in fasting for labs on lipids, cholesterol, or glucose (sugar). This does not apply to pregnant women. Water, hot tea and black coffee (with nothing added) are okay. Do not drink other fluids, diet soda or chew gum.            Nov 05, 2018  8:30 AM CST   Procedure - 2.5 hour with U2A ROOM 17   [address] [facility] ([facility])    [address] [address]               Nov 05, 2018  9:30 AM CST   Heart Cath Heart Biopsy with [facility]CVR3   [facility]    [address] [address]   [phone]            Nov 05, 2018  4:30 PM CST   (Arrive by 4:15 PM)   RETURN HEART TRANSPLANT with Klever Ernst MD   [facility] Heart Care ([facility]  "Clinics and Surgery Center)    [address]  [address] [place] [id number] [phone]              Future tests that were ordered for you today     Open Future Orders        Priority Expected Expires Ordered    CT Chest Abdomen Pelvis w/o & w Contrast Routine  10/15/2019 10/15/2018    CBC with platelets Routine  1/13/2019 10/15/2018    Comprehensive metabolic panel Routine  1/13/2019 10/15/2018    Prealbumin Routine  1/13/2019 10/15/2018    Transferrin Routine  1/13/2019 10/15/2018            Who to contact     Please call your clinic at [phone] to:    Ask questions about your health    Make or cancel appointments    Discuss your medicines    Learn about your test results    Speak to your doctor            Additional Information About Your Visit        [employer] Information     [employer] gives you secure access to your electronic health record. If you see a primary care provider, you can also send messages to your care team and make appointments. If you have questions, please call your primary care clinic.  If you do not have a primary care provider, please call [phone] and they will assist you.      [employer] is an electronic gateway that provides easy, online access to your medical records. With [employer], you can request a clinic appointment, read your test results, renew a prescription or communicate with your care team.     To access your existing account, please contact your [facility] or call [phone] for assistance.        Care EveryWhere ID     This is your Care EveryWhere ID. This could be used by other organizations to access your [facility] medical records  [id number]        Your Vitals Were     Pulse Temperature Height Pulse Oximetry BMI (Body Mass Index)       81 97.9  F (36.6  C) (Oral) 5' 9\" 100% 26.18 kg/m2        Blood Pressure from Last 3 Encounters:   10/15/18 127/80   10/12/18 123/77   10/10/18 (!) 168/92    Weight from Last 3 Encounters: "   10/15/18 177 lb 4.8 oz   10/10/18 170 lb   10/03/18 171 lb 6.4 oz              We Performed the Following     ABO/Rh type and screen     PAC Visit Referral (For [facility] Only)     PAC Visit Referral (For [facility] Only)     Yen-Operative Worksheet     Yen-Operative Worksheet        Primary Care Provider Office Phone # Fax #    Yahir Alex Turcios [phone]       [address]  [facility] [address]        Goals        Lifestyle    Increase physical activity     Notes - Note created  7/3/2018  1:51 PM by Carrie Tran, RN    Goal Statement: I will start cardiac rehab  Measure of Success: starts cardiac rehab  Supportive Steps to Achieve: support of RN CC  Barriers: none  Strengths: willingness to participate   Date to Achieve By: 7-15-18          Equal Access to Services     JOHN HAUSER AH: Hadii marsha wang hadasho Sotracey, waaxda luqadaha, qaybta kaalmada adeegyada, jose palencia . So [facility] [phone].    ATENCIÓN: Si habla español, tiene a ortiz disposición servicios gratuitos de asistencia lingüística. [facility] [phone].    We comply with applicable federal civil rights laws and [place] laws. We do not discriminate on the basis of race, color, national origin, age, disability, sex, sexual orientation, or gender identity.            Thank you!     Thank you for choosing [facility] COLON AND RECTAL SURGERY  for your care. Our goal is always to provide you with excellent care. Hearing back from our patients is one way we can continue to improve our services. Please take a few minutes to complete the written survey that you may receive in the mail after your visit with us. Thank you!             Your Updated Medication List - Protect others around you: Learn how to safely use, store and throw away your medicines at www.disposemymeds.org.          This list is accurate as of 10/15/18 10:53 AM.  Always use your most recent med list.                   Brand Name Dispense  Instructions for use Diagnosis    albuterol 108 (90 Base) MCG/ACT inhaler    PROAIR HFA/PROVENTIL HFA/VENTOLIN HFA     Inhale 2 puffs into the lungs every 4 hours as needed for shortness of breath / dyspnea or wheezing        amLODIPine 10 MG tablet    NORVASC    30 tablet    Take 1 tablet (10 mg) by mouth daily    Hypertension goal BP (blood pressure) < 130/80       aspirin 81 MG tablet      Take 1 tablet (81 mg) by mouth at bedtime        atorvastatin 40 MG tablet    LIPITOR    90 tablet    Take 1 tablet (40 mg) by mouth daily    Heart replaced by transplant (H)       biotin 5 MG Caps    BIOTIN 5000    30 capsule    Take 5 mg by mouth daily    Brittle nails       blood glucose monitoring lancets     102 each    Use to test blood sugar 2-3 times daily or as directed.  Ok to substitute alternative if insurance prefers.    Type 2 diabetes mellitus with stage 5 chronic kidney disease not on chronic dialysis, without long-term current use of insulin (H)       blood glucose monitoring test strip    no brand specified    100 each    Use to test blood sugar 2-3 times daily or as directed.    Type 2 diabetes mellitus with stage 5 chronic kidney disease not on chronic dialysis, without long-term current use of insulin (H)       chlorhexidine 0.12 % solution    PERIDEX    900 mL    Swish and spit 15 mLs in mouth 2 times daily    Oral ulceration       ciprofloxacin 500 MG tablet    CIPRO    14 tablet    Take 1 tablet (500 mg) by mouth every 24 hours    Oral ulceration       CLONIDINE HCL PO      Take 0.1 mg by mouth At Bedtime        clotrimazole 10 MG kalpana     120 Kalpana    Place 1 Kalpana (10 mg) inside cheek 4 times daily    Candidiasis of mouth       fluticasone 50 MCG/ACT spray    FLONASE    16 g    Spray 1-2 sprays into both nostrils daily    Nasal congestion       hydrALAZINE 100 MG Tabs tablet    APRESOLINE    60 tablet    Take 1 tablet (100 mg) by mouth every 8 hours    Essential hypertension       insulin aspart  100 UNIT/ML injection    NovoLOG PEN    9 mL    Inject 1-7 Units Subcutaneous 4 times daily (before meals and nightly)    Type 2 diabetes mellitus with diabetic nephropathy, with long-term current use of insulin (H)       * insulin isophane human 100 UNIT/ML injection    HumuLIN N PEN    6 mL    Inject 16 Units Subcutaneous every morning (before breakfast)    Type 2 diabetes mellitus with diabetic nephropathy, with long-term current use of insulin (H)       * insulin isophane human 100 UNIT/ML injection    HumuLIN N PEN    3 mL    Inject 12 Units Subcutaneous daily (with dinner)    Type 2 diabetes mellitus with diabetic nephropathy, with long-term current use of insulin (H)       lisinopril 5 MG tablet    PRINIVIL/ZESTRIL    30 tablet    Take 1 tablet (5 mg) by mouth daily    Hypertension goal BP (blood pressure) < 130/80       loratadine 10 MG tablet    CLARITIN     Take 10 mg by mouth daily Reported on 5/3/2017    Hypertensive cardiopathy, SOB (shortness of breath)       melatonin 1 MG Tabs tablet     120 tablet    Take 2 tablets (2 mg) by mouth nightly as needed for sleep    Heart transplanted (H)       NEPHROCAPS 1 MG capsule     120 capsule    Take 1 capsule by mouth daily        order for DME     1 Units    Equipment being ordered: Commode () Treatment Diagnosis: ESRD on PD Pt has to be connected to PD all night and can not be disconnected, hence impending his mobility to go to the bathroom. At risk for infection if he does not have this equipment.    CKD (chronic kidney disease) stage 5, GFR less than 15 ml/min (H)       pantoprazole 40 MG EC tablet    PROTONIX    60 tablet    Take 1 tablet (40 mg) by mouth 2 times daily (before meals)    Duodenitis       pentamidine 300 MG neb solution    NEBUPENT    300 mg    Inhale 300 mg into the lungs every 28 days Last given 9/19/18    Heart replaced by transplant (H)       polyethylene glycol Packet    MIRALAX/GLYCOLAX    7 packet    Take 17 g by mouth daily as  needed for constipation    Constipation, unspecified constipation type       potassium chloride SA 20 MEQ CR tablet    KLOR-CON    3 tablet    Take 1 tablet (20 mEq) by mouth daily as needed for potassium supplementation    Heart replaced by transplant (H)       predniSONE 5 MG tablet    DELTASONE    60 tablet    Take 1 tablet (5 mg) by mouth 2 times daily (with meals)    Heart transplanted (H)       rosuvastatin 20 MG tablet    CRESTOR    30 tablet    Take 1 tablet (20 mg) by mouth daily    Heart transplant recipient (H)       simethicone 80 MG chewable tablet    MYLICON    180 tablet    Take 1 tablet (80 mg) by mouth every 6 hours as needed for cramping    Flatulence, eructation and gas pain       tacrolimus 1 MG capsule    GENERIC EQUIVALENT    60 capsule    Take 1 capsule (1 mg) by mouth 2 times daily    Heart transplanted (H)       traMADol 50 MG tablet    ULTRAM    10 tablet    Take 1 tablet (50 mg) by mouth every 12 hours as needed for moderate pain    Moderate pain       triamcinolone 0.1 % ointment    KENALOG    80 g    Apply topically 2 times daily    Xerosis of skin       Urea 40 % Crea     85 g    Externally apply topically daily    Brittle nails       * Notice:  This list has 2 medication(s) that are the same as other medications prescribed for you. Read the directions carefully, and ask your doctor or other care provider to review them with you.

## 2018-10-15 NOTE — TELEPHONE ENCOUNTER
Pt called with NER heart biopsy results along with the following next steps/med changes. Reviewed labs/results with Dr TAMELA Dumont 1.2 - requested relay to his dialysis team on Sat 10/13 so solutions can be adjusted. Pt stated he wrote it down to discuss.  WBC 10 - restart  mg BID. Pt states he still has his supply.   FK level 6.5 - goal 6-8; no dose change   Pred decreased to 5 mg daily.   CRP cont to trend down.    Med changes discussed and confirmed with pt. Next lab drawn Friday at 9AM.

## 2018-10-15 NOTE — TELEPHONE ENCOUNTER
FUTURE VISIT INFORMATION      FUTURE VISIT INFORMATION:    Date: 10/19/18    Time: 10:30AM    Location: [facility]  REFERRAL INFORMATION:    Referring provider:  self    Referring providers clinic:  self    Reason for visit/diagnosis  ENT-upper palate ulceration on antibiotics in 2-3 weeks     RECORDS REQUESTED FROM:       Clinic name Comments Records Status Imaging Status   [facility] 9/11/18-9/26/18 notes EPIC    imaging 10/5/18 & 9/11/18 CT Facial Bones   EPIC PACS                             RECORDS STATUS

## 2018-10-15 NOTE — NURSING NOTE
"Chief Complaint   Patient presents with     Ostomy     New patient consultation following colectomy.        Vitals:    10/15/18 0947   BP: 127/80   BP Location: Left arm   Patient Position: Chair   Cuff Size: Adult Regular   Pulse: 81   Temp: 97.9  F (36.6  C)   TempSrc: Oral   SpO2: 100%   Weight: 177 lb 4.8 oz   Height: 5' 9\"       Body mass index is 26.18 kg/(m^2).    Shefali ALVAREZ LPN                  "

## 2018-10-15 NOTE — PROGRESS NOTES
Reviewed [facility] 713 with Dr Mcleod - pending WBC week of 10/15. 10/12 - CBC 10: MMF restarted.     ~If WBC is normal, increase Cellcept to 500 mg bid  ~Restart Bactrim next week Friday then instead of the Pentamidine tx

## 2018-10-16 DIAGNOSIS — L60.3 BRITTLE NAILS: ICD-10-CM

## 2018-10-16 NOTE — MR AVS SNAPSHOT
After Visit Summary   5/2/2018    Murray Nicholson    MRN: [id number]           Patient Information     Date Of Birth          1955        Visit Information        Provider Department      5/2/2018 3:47 PM Moraima Blackwell RN [facility] Solid Organ Transplant        Today's Diagnoses     Awaiting organ transplant    -  1       Follow-ups after your visit        Your next 10 appointments already scheduled     May 03, 2018   Procedure with Ammon Castillo MD   [facility] ([facility], [facility])    [address] [address] [phone]           The [facility] is located on the corner of [address] and [address] on the [address]. It is easily accessible from virtually any point in the [place] area, via [employer] and [employer].            Jun 22, 2018 10:00 AM CDT   (Arrive by 9:45 AM)   Return Visit with Darvin Tang MD   [facility] Rheumatology ([facility])    [address]  [address]  [facility] [id number] [phone]              Who to contact     If you have questions or need follow up information about today's clinic visit or your schedule please contact [facility] SOLID ORGAN TRANSPLANT directly at [phone].  Normal or non-critical lab and imaging results will be communicated to you by [employer] Inchart, letter or phone within 4 business days after the clinic has received the results. If you do not hear from us within 7 days, please contact the clinic through [employer] Inchart or phone. If you have a critical or abnormal lab result, we will notify you by phone as soon as possible.  Submit refill requests through [employer] or call your pharmacy and they will forward the refill request to us. Please allow 3 business days for your refill to be completed.          Additional Information About Your Visit        [employer] Information     [employer] gives you secure access to your  electronic health record. If you see a primary care provider, you can also send messages to your care team and make appointments. If you have questions, please call your primary care clinic.  If you do not have a primary care provider, please call [phone] and they will assist you.        Care EveryWhere ID     This is your Care EveryWhere ID. This could be used by other organizations to access your [facility] medical records  [id number]         Blood Pressure from Last 3 Encounters:   05/02/18 102/68   04/16/18 110/75   04/05/18 91/61    Weight from Last 3 Encounters:   05/02/18 76.5 kg (168 lb 11.2 oz)   04/16/18 76.9 kg (169 lb 9.6 oz)   04/10/18 76.7 kg (169 lb)              Today, you had the following     No orders found for display         Today's Medication Changes      Notice     This visit is during an admission. Changes to the med list made in this visit will be reflected in the After Visit Summary of the admission.             Primary Care Provider Office Phone # Fax #    Yahir Turcios [phone]       [address]  [facility] [address]        Equal Access to Services     JOHN HAUSER AH: Hadii aad ku hadasho Sobriandaali, waaxda luqadaha, qaybta kaalmada adeegyada, jose ayala. So [facility] [phone].    ATENCIÓN: Si habla español, tiene a ortiz disposición servicios gratuitos de asistencia lingüística. Llame al [phone].    We comply with applicable federal civil rights laws and [place] laws. We do not discriminate on the basis of race, color, national origin, age, disability, sex, sexual orientation, or gender identity.            Thank you!     Thank you for choosing [facility] SOLID ORGAN TRANSPLANT  for your care. Our goal is always to provide you with excellent care. Hearing back from our patients is one way we can continue to improve our services. Please take a few minutes to complete the written survey that you may receive in the mail after your  visit with us. Thank you!             Your Updated Medication List - Protect others around you: Learn how to safely use, store and throw away your medicines at www.disposemymeds.org.      Notice     This visit is during an admission. Changes to the med list made in this visit will be reflected in the After Visit Summary of the admission.       conducted a detailed discussion... I had a detailed discussion with the patient and/or guardian regarding the historical points, exam findings, and any diagnostic results supporting the discharge/admit diagnosis.

## 2018-10-17 NOTE — TELEPHONE ENCOUNTER
biotin (BIOTIN 5000) 5 MG CAPS      Last Written Prescription Date:  7-18-18  Last Fill Quantity: 30,   # refills: 3  Last Office Visit : 7-18-18  Future Office visit:  none    Routing refill request to provider for review/approval because:  Not on protocol.      Kathleen M Doege RN

## 2018-10-18 ENCOUNTER — TELEPHONE (OUTPATIENT)
Dept: TRANSPLANT | Facility: CLINIC | Age: 63
End: 2018-10-18

## 2018-10-18 DIAGNOSIS — K12.1 ORAL ULCERATION: ICD-10-CM

## 2018-10-18 RX ORDER — CIPROFLOXACIN 500 MG/1
500 TABLET, FILM COATED ORAL EVERY 24 HOURS
Qty: 14 TABLET | Refills: 0 | Status: SHIPPED | OUTPATIENT
Start: 2018-10-18 | End: 2018-10-31

## 2018-10-18 NOTE — TELEPHONE ENCOUNTER
Pt called to discus labs for tomorrow AM. No need to fast - 12-hour FK level.  Pt reports he is feeling well. No fevers.    He declines going to cardiac rehab - went once. When they told him he needed to come 2-3 x/week - he said no. He is too busy. He knows how to exercise.     Discussed that Dr RAPP would like him to cont with the antibiotic until he see Dr Lawrence on 10/31. 2 more week prescription sent.     Pt verbalized understanding of next steps.

## 2018-10-19 ENCOUNTER — TELEPHONE (OUTPATIENT)
Dept: TRANSPLANT | Facility: CLINIC | Age: 63
End: 2018-10-19

## 2018-10-19 ENCOUNTER — TELEPHONE (OUTPATIENT)
Dept: OTOLARYNGOLOGY | Facility: CLINIC | Age: 63
End: 2018-10-19

## 2018-10-19 ENCOUNTER — OFFICE VISIT (OUTPATIENT)
Dept: OTOLARYNGOLOGY | Facility: CLINIC | Age: 63
End: 2018-10-19
Payer: MEDICARE

## 2018-10-19 ENCOUNTER — TELEPHONE (OUTPATIENT)
Dept: SURGERY | Facility: CLINIC | Age: 63
End: 2018-10-19

## 2018-10-19 ENCOUNTER — PRE VISIT (OUTPATIENT)
Dept: OTOLARYNGOLOGY | Facility: CLINIC | Age: 63
End: 2018-10-19

## 2018-10-19 VITALS — BODY MASS INDEX: 26.07 KG/M2 | HEIGHT: 69 IN | WEIGHT: 176 LBS

## 2018-10-19 DIAGNOSIS — Z94.1 HEART REPLACED BY TRANSPLANT (H): ICD-10-CM

## 2018-10-19 DIAGNOSIS — B17.0: ICD-10-CM

## 2018-10-19 DIAGNOSIS — K12.1 ORAL ULCERATION: Primary | ICD-10-CM

## 2018-10-19 DIAGNOSIS — Z94.1 HEART REPLACED BY TRANSPLANT (H): Primary | ICD-10-CM

## 2018-10-19 DIAGNOSIS — K57.32 DIVERTICULITIS OF COLON: ICD-10-CM

## 2018-10-19 DIAGNOSIS — Z94.1 HEART TRANSPLANTED (H): ICD-10-CM

## 2018-10-19 LAB
ABO + RH BLD: NORMAL
ABO + RH BLD: NORMAL
ALBUMIN SERPL-MCNC: 2.8 G/DL (ref 3.4–5)
ALP SERPL-CCNC: 139 U/L (ref 40–150)
ALT SERPL W P-5'-P-CCNC: 22 U/L (ref 0–70)
ANION GAP SERPL CALCULATED.3IONS-SCNC: 8 MMOL/L (ref 3–14)
AST SERPL W P-5'-P-CCNC: 21 U/L (ref 0–45)
BILIRUB SERPL-MCNC: 0.5 MG/DL (ref 0.2–1.3)
BLD GP AB SCN SERPL QL: NORMAL
BLOOD BANK CMNT PATIENT-IMP: NORMAL
BUN SERPL-MCNC: 25 MG/DL (ref 7–30)
CALCIUM SERPL-MCNC: 8.9 MG/DL (ref 8.5–10.1)
CHLORIDE SERPL-SCNC: 101 MMOL/L (ref 94–109)
CO2 SERPL-SCNC: 28 MMOL/L (ref 20–32)
CREAT SERPL-MCNC: 3.8 MG/DL (ref 0.66–1.25)
CRP SERPL-MCNC: 17.9 MG/L (ref 0–8)
ERYTHROCYTE [DISTWIDTH] IN BLOOD BY AUTOMATED COUNT: 20.5 % (ref 10–15)
GFR SERPL CREATININE-BSD FRML MDRD: 16 ML/MIN/1.7M2
GLUCOSE SERPL-MCNC: 164 MG/DL (ref 70–99)
HCT VFR BLD AUTO: 32 % (ref 40–53)
HGB BLD-MCNC: 9.3 G/DL (ref 13.3–17.7)
MAGNESIUM SERPL-MCNC: 1.6 MG/DL (ref 1.6–2.3)
MCH RBC QN AUTO: 28.8 PG (ref 26.5–33)
MCHC RBC AUTO-ENTMCNC: 29.1 G/DL (ref 31.5–36.5)
MCV RBC AUTO: 99 FL (ref 78–100)
PHOSPHATE SERPL-MCNC: 2 MG/DL (ref 2.5–4.5)
PLATELET # BLD AUTO: 244 10E9/L (ref 150–450)
POTASSIUM SERPL-SCNC: 4.2 MMOL/L (ref 3.4–5.3)
PREALB SERPL IA-MCNC: 27 MG/DL (ref 15–45)
PROT SERPL-MCNC: 7.2 G/DL (ref 6.8–8.8)
RBC # BLD AUTO: 3.23 10E12/L (ref 4.4–5.9)
SODIUM SERPL-SCNC: 137 MMOL/L (ref 133–144)
SPECIMEN EXP DATE BLD: NORMAL
TRANSFERRIN SERPL-MCNC: 188 MG/DL (ref 210–360)
WBC # BLD AUTO: 8 10E9/L (ref 4–11)

## 2018-10-19 PROCEDURE — 88312 SPECIAL STAINS GROUP 1: CPT | Performed by: OTOLARYNGOLOGY

## 2018-10-19 PROCEDURE — 84466 ASSAY OF TRANSFERRIN: CPT | Performed by: COLON & RECTAL SURGERY

## 2018-10-19 PROCEDURE — 88305 TISSUE EXAM BY PATHOLOGIST: CPT | Performed by: OTOLARYNGOLOGY

## 2018-10-19 RX ORDER — SULFAMETHOXAZOLE AND TRIMETHOPRIM 400; 80 MG/1; MG/1
TABLET ORAL
Qty: 14 TABLET | Refills: 3 | Status: SHIPPED | OUTPATIENT
Start: 2018-10-19 | End: 2018-12-28

## 2018-10-19 RX ORDER — MYCOPHENOLATE MOFETIL 250 MG/1
500 CAPSULE ORAL 2 TIMES DAILY
Qty: 120 CAPSULE | Refills: 11 | Status: SHIPPED | OUTPATIENT
Start: 2018-10-19 | End: 2018-11-07

## 2018-10-19 RX ORDER — DIPHENHYDRAMINE HYDROCHLORIDE 25 MG/1
5 TABLET ORAL DAILY
Qty: 30 CAPSULE | Refills: 3 | Status: ON HOLD | OUTPATIENT
Start: 2018-10-19 | End: 2019-12-22

## 2018-10-19 ASSESSMENT — PAIN SCALES - GENERAL: PAINLEVEL: NO PAIN (0)

## 2018-10-19 NOTE — NURSING NOTE
Chief Complaint   Patient presents with     Consult     upper palate ulercation for 2-3 weeks      Jairo Guadarrama, EMT

## 2018-10-19 NOTE — TELEPHONE ENCOUNTER
----- Message from Sunita Vogt RN sent at 10/15/2018 10:48 AM CDT -----  Regarding: Orders  Chivo Prescott,    This patinet left before I could have him schedule. Dr. Tran wants to do a CT a/p with iv, oral and rectal contrast a few days up to 1 week prior to his unit J flex sig. Heart transplant and end stage renal so he has to be at Unit [facility]. Can you schedule the CT prior to his unit J procedure. This has to be done mid to end of November. After his Unit [facility] scope he will need a clinic visit beginning of December to discuss surgery. Surgery orders are in too and he will need pac with that too. Does that make sense?    Thanks,  Sunita

## 2018-10-19 NOTE — LETTER
10/19/2018       RE: Murray Nicholson  [address]  [place] [address]     Dear Colleague,    Thank you for referring your patient, Murray Nicholson, to the [facility] at [facility]. Please see a copy of my visit note below.    Dear Dr. Little:    I had the pleasure of meeting Murray Nicholson in consultation today at the [facility] at your request.     History of Present Illness:     HISTORY OF PRESENT ILLNESS:  Mr. Nicholson is here for the first time.  He has a lesion that is exophytic that grows off of his hard palate.  It has been removed a couple times.  He is status post transplantation.  This could represent some type of granulomatous condition or something similar that gets superinfected, it is hard to say.  Eventually he gets a slurred speech with this.                  MEDICATIONS:     Current Outpatient Prescriptions   Medication Sig Dispense Refill     albuterol (PROAIR HFA/PROVENTIL HFA/VENTOLIN HFA) 108 (90 Base) MCG/ACT inhaler Inhale 2 puffs into the lungs every 4 hours as needed for shortness of breath / dyspnea or wheezing       amLODIPine (NORVASC) 10 MG tablet Take 1 tablet (10 mg) by mouth daily 30 tablet 0     ASPIRIN 81 MG OR TABS Take 1 tablet (81 mg) by mouth at bedtime       atorvastatin (LIPITOR) 40 MG tablet Take 1 tablet (40 mg) by mouth daily 90 tablet 3     biotin (BIOTIN 5000) 5 MG CAPS Take 5 mg by mouth daily 30 capsule 3     blood glucose monitoring (ACCU-CHEK FASTCLIX) lancets Use to test blood sugar 2-3 times daily or as directed.  Ok to substitute alternative if insurance prefers. 102 each 11     blood glucose monitoring (NO BRAND SPECIFIED) test strip Use to test blood sugar 2-3 times daily or as directed. 100 each 11     chlorhexidine (PERIDEX) 0.12 % solution Swish and spit 15 mLs in mouth 2 times daily 900 mL 0     ciprofloxacin (CIPRO) 500 MG tablet Take 1 tablet (500 mg) by mouth  every 24 hours 14 tablet 0     CLONIDINE HCL PO Take 0.1 mg by mouth At Bedtime       clotrimazole 10 MG kalpana Place 1 Kalpana (10 mg) inside cheek 4 times daily 120 Kalpana 11     fluticasone (FLONASE) 50 MCG/ACT spray Spray 1-2 sprays into both nostrils daily 16 g 11     gentian violet 1 % solution Take 0.5 mLs by mouth 4 times daily 30 mL 1     hydrALAZINE (APRESOLINE) 100 MG TABS tablet Take 1 tablet (100 mg) by mouth every 8 hours 60 tablet      insulin aspart (NOVOLOG PEN) 100 UNIT/ML injection Inject 1-7 Units Subcutaneous 4 times daily (before meals and nightly) 9 mL 3     insulin isophane human (HUMULIN N PEN) 100 UNIT/ML injection Inject 16 Units Subcutaneous every morning (before breakfast) 6 mL 11     insulin isophane human (HUMULIN N PEN) 100 UNIT/ML injection Inject 12 Units Subcutaneous daily (with dinner) 3 mL 11     lisinopril (PRINIVIL/ZESTRIL) 5 MG tablet Take 1 tablet (5 mg) by mouth daily 30 tablet 0     loratadine (CLARITIN) 10 MG tablet Take 10 mg by mouth daily Reported on 5/3/2017       melatonin 1 MG TABS tablet Take 2 tablets (2 mg) by mouth nightly as needed for sleep 120 tablet 1     mycophenolate (GENERIC EQUIVALENT) 250 MG capsule Take 1 capsule (250 mg) by mouth 2 times daily 60 capsule 11     NEPHROCAPS 1 MG capsule Take 1 capsule by mouth daily 120 capsule 0     order for DME Equipment being ordered: Carol ()  Treatment Diagnosis: ESRD on PD  Pt has to be connected to PD all night and can not be disconnected, hence impending his mobility to go to the bathroom. At risk for infection if he does not have this equipment. 1 Units 0     pantoprazole (PROTONIX) 40 MG EC tablet Take 1 tablet (40 mg) by mouth 2 times daily (before meals) 60 tablet 0     pentamidine (NEBUPENT) 300 MG neb solution Inhale 300 mg into the lungs every 28 days Last given 9/19/18 300 mg 0     polyethylene glycol (MIRALAX/GLYCOLAX) Packet Take 17 g by mouth daily as needed for constipation 7 packet       potassium chloride SA (KLOR-CON) 20 MEQ CR tablet Take 1 tablet (20 mEq) by mouth daily as needed for potassium supplementation 3 tablet 0     predniSONE (DELTASONE) 5 MG tablet Take 1 tablet (5 mg) by mouth daily 60 tablet 0     rosuvastatin (CRESTOR) 20 MG tablet Take 1 tablet (20 mg) by mouth daily 30 tablet 1     simethicone (MYLICON) 80 MG chewable tablet Take 1 tablet (80 mg) by mouth every 6 hours as needed for cramping 180 tablet      tacrolimus (GENERIC EQUIVALENT) 1 MG capsule Take 1 capsule (1 mg) by mouth 2 times daily 60 capsule 0     traMADol (ULTRAM) 50 MG tablet Take 1 tablet (50 mg) by mouth every 12 hours as needed for moderate pain 10 tablet 0     triamcinolone (KENALOG) 0.1 % ointment Apply topically 2 times daily 80 g 0     Urea 40 % CREA Externally apply topically daily 85 g 1       ALLERGIES:    Allergies   Allergen Reactions     Norco [Hydrocodone-Acetaminophen] Nausea and Vomiting     Cats      Throat tightness     Isosorbide Other (See Comments)     hypotension     Penicillins Hives     Seasonal Allergies      rhinitis     Shrimp      Throat closes        HABITS/SOCIAL HISTORY: non smoker     PAST MEDICAL HISTORY:   Past Medical History:   Diagnosis Date     (HFpEF) heart failure with preserved ejection fraction (H)      Allergic rhinitis, cause unspecified      Anemia of chronic kidney failure      AS (aortic stenosis)      Ascending aortic aneurysm (H)      Bicuspid aortic valve      CAD (coronary artery disease)      Chronic kidney disease, stage 5 (H)      Congestive heart failure, unspecified      Dyslipidemia      Esophageal reflux      ESRD (end stage renal disease) (H)      Hearing problem      Hypersomnia with sleep apnea, unspecified      Hypertension      Immunosuppression (H)      Kidney problem      MGUS (monoclonal gammopathy of unknown significance)      Mitral regurgitation      SHEELA (obstructive sleep apnea)      Pneumonia      Systolic heart failure (H)      Type 2 diabetes  mellitus (H)         FAMILY HISTORY:    Family History   Problem Relation Age of Onset     C.A.D. Father       from-never knew father-age 60     Diabetes Father      Cerebrovascular Disease Father      Hypertension Father      Hypertension No family hx of      Breast Cancer No family hx of      Cancer - colorectal No family hx of      Prostate Cancer No family hx of      KIDNEY DISEASE No family hx of      Melanoma No family hx of      Skin Cancer No family hx of        REVIEW OF SYSTEMS:  12 point ROS was negative other than the symptoms noted above in the HPI.    PHYSICAL EXAMINATION:  PHYSICAL EXAMINATION:    Constitutional:  The patient was unaccompanied, well-groomed, and in no acute distress.    Skin:  Warm and pink.    Neurologic:  Alert and oriented x 3.  CN's III-XII within normal limits.  Voice normal.   Psychiatric:  The patient's affect was calm, cooperative, and appropriate.    Respiratory:  Breathing comfortably without stridor or exertion of accessory muscles.    Eyes: Pupils were equal and reactive.  Extraocular movement intact.    Head:  Normocephalic and atraumatic.  No lesions or scars.    Ears:  Pinnae and tragus non-tender.  EAC's and TM's were clear.     Nose:  Sinuses were non-tender.  Anterior rhinoscopy revealed midline septum and absence of purulence or polyps.    OC/OP:  Normal tongue, floor of mouth, buccal mucosa, and palate.  No lesions or masses on inspection or palpation.  No abnormal lymph tissue in the oropharynx.  The pterygoid region is non-tender.    HP/LX:  Mirror exam of the larynx reveals a sharp epiglottis and mobile arytenoids.  No pooling seen.  The cords themselves appear clear and mobile.   Neck:  Supple with normal laryngeal and tracheal landmarks.  The parotid beds were without masses.  No palpable thyroid.  Lymphatic:  There is no palpable lymphadenopathy in the neck.          PROCEDURE:  I removed this lesion off of his hard palate today as a procedure.  I grasped  it and resected it in its entirety and sent it off for routine pathology.        IMAGING:  I went over the films today and the films on him shows some mottling of the anterior hard palate, so it may be that there is an underlying problem with the palate.  It does not grossly look like a tumor, but one wonders if it could represent some type of osteomyelitis or something else of this nature, but it is very unusual.       PLAN:  We will get the pathology back of the lesion and I may have to take him to the operating room for a hard palate biopsy.      FO/ms     IMPRESSION AND PLAN:     Thank you very much for the opportunity to participate in the care of your patient.      Tristan Bañuelos M.D.  Otolaryngology- Head & Neck Surgery  [phone]

## 2018-10-19 NOTE — TELEPHONE ENCOUNTER
Reviewed labs with Dr RAPP. WBC 8; requested to increase MMF to 500 mg BID. Restart Bactrim. 30 days since last Pentamidine.     Pt called with results and med changes.    Recheck labs next week.

## 2018-10-19 NOTE — TELEPHONE ENCOUNTER
This writer called the patient to schedule the following appointments below in the order listed.    CT/PAC scheduled for 11/16/2018  Flex sig with Dr. Tran 11/21/2018  Clinic with Dr. Tran 12/03/2018  Surgery with Dr. Tran possibly 12/12/2018    The first three are scheduled and the last one is tentative. This information was confirmed with patient and a MyChart will be sent to the patient confirming the details.

## 2018-10-19 NOTE — PROGRESS NOTES
Dear Dr. Little:    I had the pleasure of meeting Murray Nicholson in consultation today at the [facility] at your request.     History of Present Illness:     HISTORY OF PRESENT ILLNESS:  Mr. Nicholson is here for the first time.  He has a lesion that is exophytic that grows off of his hard palate.  It has been removed a couple times.  He is status post transplantation.  This could represent some type of granulomatous condition or something similar that gets superinfected, it is hard to say.  Eventually he gets a slurred speech with this.                  MEDICATIONS:     Current Outpatient Prescriptions   Medication Sig Dispense Refill     albuterol (PROAIR HFA/PROVENTIL HFA/VENTOLIN HFA) 108 (90 Base) MCG/ACT inhaler Inhale 2 puffs into the lungs every 4 hours as needed for shortness of breath / dyspnea or wheezing       amLODIPine (NORVASC) 10 MG tablet Take 1 tablet (10 mg) by mouth daily 30 tablet 0     ASPIRIN 81 MG OR TABS Take 1 tablet (81 mg) by mouth at bedtime       atorvastatin (LIPITOR) 40 MG tablet Take 1 tablet (40 mg) by mouth daily 90 tablet 3     biotin (BIOTIN 5000) 5 MG CAPS Take 5 mg by mouth daily 30 capsule 3     blood glucose monitoring (ACCU-CHEK FASTCLIX) lancets Use to test blood sugar 2-3 times daily or as directed.  Ok to substitute alternative if insurance prefers. 102 each 11     blood glucose monitoring (NO BRAND SPECIFIED) test strip Use to test blood sugar 2-3 times daily or as directed. 100 each 11     chlorhexidine (PERIDEX) 0.12 % solution Swish and spit 15 mLs in mouth 2 times daily 900 mL 0     ciprofloxacin (CIPRO) 500 MG tablet Take 1 tablet (500 mg) by mouth every 24 hours 14 tablet 0     CLONIDINE HCL PO Take 0.1 mg by mouth At Bedtime       clotrimazole 10 MG kalpana Place 1 Kalpana (10 mg) inside cheek 4 times daily 120 Kalpana 11     fluticasone (FLONASE) 50 MCG/ACT spray Spray 1-2 sprays into both nostrils daily 16 g 11     gentian violet 1 %  solution Take 0.5 mLs by mouth 4 times daily 30 mL 1     hydrALAZINE (APRESOLINE) 100 MG TABS tablet Take 1 tablet (100 mg) by mouth every 8 hours 60 tablet      insulin aspart (NOVOLOG PEN) 100 UNIT/ML injection Inject 1-7 Units Subcutaneous 4 times daily (before meals and nightly) 9 mL 3     insulin isophane human (HUMULIN N PEN) 100 UNIT/ML injection Inject 16 Units Subcutaneous every morning (before breakfast) 6 mL 11     insulin isophane human (HUMULIN N PEN) 100 UNIT/ML injection Inject 12 Units Subcutaneous daily (with dinner) 3 mL 11     lisinopril (PRINIVIL/ZESTRIL) 5 MG tablet Take 1 tablet (5 mg) by mouth daily 30 tablet 0     loratadine (CLARITIN) 10 MG tablet Take 10 mg by mouth daily Reported on 5/3/2017       melatonin 1 MG TABS tablet Take 2 tablets (2 mg) by mouth nightly as needed for sleep 120 tablet 1     mycophenolate (GENERIC EQUIVALENT) 250 MG capsule Take 1 capsule (250 mg) by mouth 2 times daily 60 capsule 11     NEPHROCAPS 1 MG capsule Take 1 capsule by mouth daily 120 capsule 0     order for DME Equipment being ordered: Carol ()  Treatment Diagnosis: ESRD on PD  Pt has to be connected to PD all night and can not be disconnected, hence impending his mobility to go to the bathroom. At risk for infection if he does not have this equipment. 1 Units 0     pantoprazole (PROTONIX) 40 MG EC tablet Take 1 tablet (40 mg) by mouth 2 times daily (before meals) 60 tablet 0     pentamidine (NEBUPENT) 300 MG neb solution Inhale 300 mg into the lungs every 28 days Last given 9/19/18 300 mg 0     polyethylene glycol (MIRALAX/GLYCOLAX) Packet Take 17 g by mouth daily as needed for constipation 7 packet      potassium chloride SA (KLOR-CON) 20 MEQ CR tablet Take 1 tablet (20 mEq) by mouth daily as needed for potassium supplementation 3 tablet 0     predniSONE (DELTASONE) 5 MG tablet Take 1 tablet (5 mg) by mouth daily 60 tablet 0     rosuvastatin (CRESTOR) 20 MG tablet Take 1 tablet (20 mg) by mouth  daily 30 tablet 1     simethicone (MYLICON) 80 MG chewable tablet Take 1 tablet (80 mg) by mouth every 6 hours as needed for cramping 180 tablet      tacrolimus (GENERIC EQUIVALENT) 1 MG capsule Take 1 capsule (1 mg) by mouth 2 times daily 60 capsule 0     traMADol (ULTRAM) 50 MG tablet Take 1 tablet (50 mg) by mouth every 12 hours as needed for moderate pain 10 tablet 0     triamcinolone (KENALOG) 0.1 % ointment Apply topically 2 times daily 80 g 0     Urea 40 % CREA Externally apply topically daily 85 g 1       ALLERGIES:    Allergies   Allergen Reactions     Norco [Hydrocodone-Acetaminophen] Nausea and Vomiting     Cats      Throat tightness     Isosorbide Other (See Comments)     hypotension     Penicillins Hives     Seasonal Allergies      rhinitis     Shrimp      Throat closes        HABITS/SOCIAL HISTORY: non smoker     PAST MEDICAL HISTORY:   Past Medical History:   Diagnosis Date     (HFpEF) heart failure with preserved ejection fraction (H)      Allergic rhinitis, cause unspecified      Anemia of chronic kidney failure      AS (aortic stenosis)      Ascending aortic aneurysm (H)      Bicuspid aortic valve      CAD (coronary artery disease)      Chronic kidney disease, stage 5 (H)      Congestive heart failure, unspecified      Dyslipidemia      Esophageal reflux      ESRD (end stage renal disease) (H)      Hearing problem      Hypersomnia with sleep apnea, unspecified      Hypertension      Immunosuppression (H)      Kidney problem      MGUS (monoclonal gammopathy of unknown significance)      Mitral regurgitation      SHEELA (obstructive sleep apnea)      Pneumonia      Systolic heart failure (H)      Type 2 diabetes mellitus (H)         FAMILY HISTORY:    Family History   Problem Relation Age of Onset     C.A.D. Father       from-never knew father-age 60     Diabetes Father      Cerebrovascular Disease Father      Hypertension Father      Hypertension No family hx of      Breast Cancer No family hx of       Cancer - colorectal No family hx of      Prostate Cancer No family hx of      KIDNEY DISEASE No family hx of      Melanoma No family hx of      Skin Cancer No family hx of        REVIEW OF SYSTEMS:  12 point ROS was negative other than the symptoms noted above in the HPI.    PHYSICAL EXAMINATION:  PHYSICAL EXAMINATION:    Constitutional:  The patient was unaccompanied, well-groomed, and in no acute distress.    Skin:  Warm and pink.    Neurologic:  Alert and oriented x 3.  CN's III-XII within normal limits.  Voice normal.   Psychiatric:  The patient's affect was calm, cooperative, and appropriate.    Respiratory:  Breathing comfortably without stridor or exertion of accessory muscles.    Eyes: Pupils were equal and reactive.  Extraocular movement intact.    Head:  Normocephalic and atraumatic.  No lesions or scars.    Ears:  Pinnae and tragus non-tender.  EAC's and TM's were clear.     Nose:  Sinuses were non-tender.  Anterior rhinoscopy revealed midline septum and absence of purulence or polyps.    OC/OP:  Normal tongue, floor of mouth, buccal mucosa, and palate.  No lesions or masses on inspection or palpation.  No abnormal lymph tissue in the oropharynx.  The pterygoid region is non-tender.    HP/LX:  Mirror exam of the larynx reveals a sharp epiglottis and mobile arytenoids.  No pooling seen.  The cords themselves appear clear and mobile.   Neck:  Supple with normal laryngeal and tracheal landmarks.  The parotid beds were without masses.  No palpable thyroid.  Lymphatic:  There is no palpable lymphadenopathy in the neck.          PROCEDURE:  I removed this lesion off of his hard palate today as a procedure.  I grasped it and resected it in its entirety and sent it off for routine pathology.        IMAGING:  I went over the films today and the films on him shows some mottling of the anterior hard palate, so it may be that there is an underlying problem with the palate.  It does not grossly look like a tumor,  but one wonders if it could represent some type of osteomyelitis or something else of this nature, but it is very unusual.       PLAN:  We will get the pathology back of the lesion and I may have to take him to the operating room for a hard palate biopsy.      FO/ms     IMPRESSION AND PLAN:     Thank you very much for the opportunity to participate in the care of your patient.      Tristan Bañuelos M.D.  Otolaryngology- Head & Neck Surgery  [phone]

## 2018-10-19 NOTE — MR AVS SNAPSHOT
After Visit Summary   10/19/2018    Murray Nicholson    MRN: [id number]           Patient Information     Date Of Birth          1955        Visit Information        Provider Department      10/19/2018 10:30 AM Tristan Bañuelos MD [facility] Ear Nose and Throat        Today's Diagnoses     Oral ulceration    -  1       Follow-ups after your visit        Follow-up notes from your care team     Return in about 3 weeks (around 11/9/2018).      Your next 10 appointments already scheduled     Oct 26, 2018  7:30 AM CDT   LAB with [facility] ([facility])    [address] [place] [address]  [address] [facility] [address]   [phone]           Please do not eat 10-12 hours before your appointment if you are coming in fasting for labs on lipids, cholesterol, or glucose (sugar). This does not apply to pregnant women. Water, hot tea and black coffee (with nothing added) are okay. Do not drink other fluids, diet soda or chew gum.            Oct 26, 2018  9:00 AM CDT   (Arrive by 8:45 AM)   SHORT with Eduardo Lea [facility] Medication Therapy Management ([facility])    [address] [place] [address]   [phone]            Oct 31, 2018  3:30 PM CDT   (Arrive by 3:15 PM)   Return Visit with Vinayak Lawrence MD   [facility] and Infectious Diseases ([facility])    [address] [place] [address]  [address]  [facility] [address]   [phone]            Nov 02, 2018  9:00 AM CDT   LAB with [facility] ([facility])    [address] [place] [id number] [phone]           Please do not eat 10-12 hours before your appointment if you are coming in fasting for labs on lipids, cholesterol, or glucose (sugar). This does not apply to pregnant women. Water, hot tea and black coffee (with nothing added) are okay. Do not drink  other fluids, diet soda or chew gum.            Nov 05, 2018  8:00 AM CST   LAB with [facility] GOLD WAITING   [facility]    [address] [facility] [address]              Please do not eat 10-12 hours before your appointment if you are coming in fasting for labs on lipids, cholesterol, or glucose (sugar). This does not apply to pregnant women. Water, hot tea and black coffee (with nothing added) are okay. Do not drink other fluids, diet soda or chew gum.            Nov 05, 2018  8:30 AM CST   Procedure - 2.5 hour with U2A ROOM 17   [address] [facility] ([facility])    [address] [facility] [address]               Nov 05, 2018  9:30 AM CST   Heart Cath Heart Biopsy [facility]  Heart Cath Lab ([facility])    [address] [facility] [address]   [phone]            Nov 05, 2018  4:30 PM CST   (Arrive by 4:15 PM)   RETURN HEART TRANSPLANT with Klever Ernst MD   [facility] ([facility])    [address] [facility]  [address] [place] [address] [phone]            Dec 03, 2018  9:30 AM CST   Procedure - 2.5 hour with U2A ROOM 12   [address] [facility] ([facility])    [address] [facility] [address]               Dec 03, 2018 10:30 AM CST   [facility] ([facility])    [address] [facility] [address]   [phone]              Who to contact     Please call your clinic at [phone] to:    Ask questions about your health    Make or cancel appointments    Discuss your medicines    Learn about your test results    Speak to your doctor            Additional Information About Your Visit        MyChart Information     MyChart gives  "you secure access to your electronic health record. If you see a primary care provider, you can also send messages to your care team and make appointments. If you have questions, please call your primary care clinic.  If you do not have a primary care provider, please call [phone] and they will assist you.      [employer] is an electronic gateway that provides easy, online access to your medical records. With [employer], you can request a clinic appointment, read your test results, renew a prescription or communicate with your care team.     To access your existing account, please contact your [facility] or call [phone] for assistance.        Care EveryWhere ID     This is your Care EveryWhere ID. This could be used by other organizations to access your [facility] medical records  [id number]        Your Vitals Were     Height BMI (Body Mass Index)                1.753 m (5' 9\") 25.99 kg/m2           Blood Pressure from Last 3 Encounters:   10/15/18 127/80   10/12/18 123/77   10/10/18 (!) 168/92    Weight from Last 3 Encounters:   10/19/18 79.8 kg (176 lb)   10/15/18 80.4 kg (177 lb 4.8 oz)   10/10/18 77.1 kg (170 lb)              We Performed the Following     Surgical pathology exam          Today's Medication Changes          These changes are accurate as of 10/19/18 11:19 AM.  If you have any questions, ask your nurse or doctor.               Start taking these medicines.        Dose/Directions    gentian violet 1 % solution   Used for:  Oral ulceration   Started by:  Tristan Bañuelos MD        Dose:  0.5 mL   Take 0.5 mLs by mouth 4 times daily   Quantity:  30 mL   Refills:  1            Where to get your medicines      These medications were sent to [place], [place] - [address]  [address] [facility] [address], [place] [address]    Hours:  TRANSPLANT PHONE NUMBER [phone] Phone:  [phone]     gentian violet 1 % " solution                Primary Care Provider Office Phone # Fax #    Yahir Alex Turcios [phone]       [address]  [facility] [address]        Goals        Lifestyle    Increase physical activity     Notes - Note created  7/3/2018  1:51 PM by Carrie Tran, RN    Goal Statement: I will start cardiac rehab  Measure of Success: starts cardiac rehab  Supportive Steps to Achieve: support of RN CC  Barriers: none  Strengths: willingness to participate   Date to Achieve By: 7-15-18          Equal Access to Services     JOHN HAUSER AH: Hadii aad ku hadasho Soomaali, waaxda luqadaha, qaybta kaalmada adeegyada, waxay idiin hayaan adeeg kharash la'aan . So [facility] [phone].    ATENCIÓN: Si habla español, tiene a ortiz disposición servicios gratuitos de asistencia lingüística. Llame al [phone].    We comply with applicable federal civil rights laws and [place] laws. We do not discriminate on the basis of race, color, national origin, age, disability, sex, sexual orientation, or gender identity.            Thank you!     Thank you for choosing [facility] EAR NOSE AND THROAT  for your care. Our goal is always to provide you with excellent care. Hearing back from our patients is one way we can continue to improve our services. Please take a few minutes to complete the written survey that you may receive in the mail after your visit with us. Thank you!             Your Updated Medication List - Protect others around you: Learn how to safely use, store and throw away your medicines at www.disposemymeds.org.          This list is accurate as of 10/19/18 11:19 AM.  Always use your most recent med list.                   Brand Name Dispense Instructions for use Diagnosis    albuterol 108 (90 Base) MCG/ACT inhaler    PROAIR HFA/PROVENTIL HFA/VENTOLIN HFA     Inhale 2 puffs into the lungs every 4 hours as needed for shortness of breath / dyspnea or wheezing        amLODIPine 10 MG tablet    NORVASC    30  tablet    Take 1 tablet (10 mg) by mouth daily    Hypertension goal BP (blood pressure) < 130/80       aspirin 81 MG tablet      Take 1 tablet (81 mg) by mouth at bedtime        atorvastatin 40 MG tablet    LIPITOR    90 tablet    Take 1 tablet (40 mg) by mouth daily    Heart replaced by transplant (H)       biotin 5 MG Caps    BIOTIN 5000    30 capsule    Take 5 mg by mouth daily    Brittle nails       blood glucose monitoring lancets     102 each    Use to test blood sugar 2-3 times daily or as directed.  Ok to substitute alternative if insurance prefers.    Type 2 diabetes mellitus with stage 5 chronic kidney disease not on chronic dialysis, without long-term current use of insulin (H)       blood glucose monitoring test strip    no brand specified    100 each    Use to test blood sugar 2-3 times daily or as directed.    Type 2 diabetes mellitus with stage 5 chronic kidney disease not on chronic dialysis, without long-term current use of insulin (H)       chlorhexidine 0.12 % solution    PERIDEX    900 mL    Swish and spit 15 mLs in mouth 2 times daily    Oral ulceration       ciprofloxacin 500 MG tablet    CIPRO    14 tablet    Take 1 tablet (500 mg) by mouth every 24 hours    Oral ulceration       CLONIDINE HCL PO      Take 0.1 mg by mouth At Bedtime        clotrimazole 10 MG kalpana     120 Kalpana    Place 1 Kalpana (10 mg) inside cheek 4 times daily    Candidiasis of mouth       fluticasone 50 MCG/ACT spray    FLONASE    16 g    Spray 1-2 sprays into both nostrils daily    Nasal congestion       gentian violet 1 % solution     30 mL    Take 0.5 mLs by mouth 4 times daily    Oral ulceration       hydrALAZINE 100 MG Tabs tablet    APRESOLINE    60 tablet    Take 1 tablet (100 mg) by mouth every 8 hours    Essential hypertension       insulin aspart 100 UNIT/ML injection    NovoLOG PEN    9 mL    Inject 1-7 Units Subcutaneous 4 times daily (before meals and nightly)    Type 2 diabetes mellitus with diabetic  nephropathy, with long-term current use of insulin (H)       * insulin isophane human 100 UNIT/ML injection    HumuLIN N PEN    6 mL    Inject 16 Units Subcutaneous every morning (before breakfast)    Type 2 diabetes mellitus with diabetic nephropathy, with long-term current use of insulin (H)       * insulin isophane human 100 UNIT/ML injection    HumuLIN N PEN    3 mL    Inject 12 Units Subcutaneous daily (with dinner)    Type 2 diabetes mellitus with diabetic nephropathy, with long-term current use of insulin (H)       lisinopril 5 MG tablet    PRINIVIL/ZESTRIL    30 tablet    Take 1 tablet (5 mg) by mouth daily    Hypertension goal BP (blood pressure) < 130/80       loratadine 10 MG tablet    CLARITIN     Take 10 mg by mouth daily Reported on 5/3/2017    Hypertensive cardiopathy, SOB (shortness of breath)       melatonin 1 MG Tabs tablet     120 tablet    Take 2 tablets (2 mg) by mouth nightly as needed for sleep    Heart transplanted (H)       mycophenolate 250 MG capsule    GENERIC EQUIVALENT    60 capsule    Take 1 capsule (250 mg) by mouth 2 times daily    Heart transplanted (H)       NEPHROCAPS 1 MG capsule     120 capsule    Take 1 capsule by mouth daily        order for DME     1 Units    Equipment being ordered: Carol () Treatment Diagnosis: ESRD on PD Pt has to be connected to PD all night and can not be disconnected, hence impending his mobility to go to the bathroom. At risk for infection if he does not have this equipment.    CKD (chronic kidney disease) stage 5, GFR less than 15 ml/min (H)       pantoprazole 40 MG EC tablet    PROTONIX    60 tablet    Take 1 tablet (40 mg) by mouth 2 times daily (before meals)    Duodenitis       pentamidine 300 MG neb solution    NEBUPENT    300 mg    Inhale 300 mg into the lungs every 28 days Last given 9/19/18    Heart replaced by transplant (H)       polyethylene glycol Packet    MIRALAX/GLYCOLAX    7 packet    Take 17 g by mouth daily as needed for  constipation    Constipation, unspecified constipation type       potassium chloride SA 20 MEQ CR tablet    KLOR-CON    3 tablet    Take 1 tablet (20 mEq) by mouth daily as needed for potassium supplementation    Heart replaced by transplant (H)       predniSONE 5 MG tablet    DELTASONE    60 tablet    Take 1 tablet (5 mg) by mouth daily    Heart transplanted (H)       rosuvastatin 20 MG tablet    CRESTOR    30 tablet    Take 1 tablet (20 mg) by mouth daily    Heart transplant recipient (H)       simethicone 80 MG chewable tablet    MYLICON    180 tablet    Take 1 tablet (80 mg) by mouth every 6 hours as needed for cramping    Flatulence, eructation and gas pain       tacrolimus 1 MG capsule    GENERIC EQUIVALENT    60 capsule    Take 1 capsule (1 mg) by mouth 2 times daily    Heart transplanted (H)       traMADol 50 MG tablet    ULTRAM    10 tablet    Take 1 tablet (50 mg) by mouth every 12 hours as needed for moderate pain    Moderate pain       triamcinolone 0.1 % ointment    KENALOG    80 g    Apply topically 2 times daily    Xerosis of skin       Urea 40 % Crea     85 g    Externally apply topically daily    Brittle nails       * Notice:  This list has 2 medication(s) that are the same as other medications prescribed for you. Read the directions carefully, and ask your doctor or other care provider to review them with you.

## 2018-10-22 ENCOUNTER — TELEPHONE (OUTPATIENT)
Dept: TRANSPLANT | Facility: CLINIC | Age: 63
End: 2018-10-22

## 2018-10-22 DIAGNOSIS — I10 ESSENTIAL HYPERTENSION: ICD-10-CM

## 2018-10-22 NOTE — TELEPHONE ENCOUNTER
Message left on VM that there are medical updates necessary before patient can become active on Kidney Transplant Wait List.  Requested patient to return call to speak more specifically about requirements.  Contact information given.  Reminded patient he is still on the Kidney Transplant List and acquiring wait time but is inactive at this time.

## 2018-10-23 ENCOUNTER — TELEPHONE (OUTPATIENT)
Dept: OTOLARYNGOLOGY | Facility: CLINIC | Age: 63
End: 2018-10-23

## 2018-10-23 DIAGNOSIS — K12.1 ORAL ULCERATION: Primary | ICD-10-CM

## 2018-10-23 DIAGNOSIS — I10 HYPERTENSION GOAL BP (BLOOD PRESSURE) < 130/80: ICD-10-CM

## 2018-10-23 DIAGNOSIS — K13.70 ORAL LESION: ICD-10-CM

## 2018-10-23 LAB — COPATH REPORT: NORMAL

## 2018-10-23 RX ORDER — LISINOPRIL 5 MG/1
5 TABLET ORAL DAILY
Qty: 90 TABLET | Refills: 3 | Status: SHIPPED | OUTPATIENT
Start: 2018-10-23 | End: 2018-12-27

## 2018-10-23 RX ORDER — AMLODIPINE BESYLATE 10 MG/1
10 TABLET ORAL DAILY
Qty: 90 TABLET | Refills: 3 | Status: SHIPPED | OUTPATIENT
Start: 2018-10-23 | End: 2019-10-25

## 2018-10-23 RX ORDER — HYDRALAZINE HYDROCHLORIDE 100 MG/1
100 TABLET, FILM COATED ORAL EVERY 8 HOURS
Qty: 90 TABLET | Refills: 11 | Status: ON HOLD | OUTPATIENT
Start: 2018-10-23 | End: 2019-12-22

## 2018-10-23 NOTE — TELEPHONE ENCOUNTER
RN calling pt with biopsy results from 10/19. Biopsy is consistent with necrotic tissue with some food particles and bacterial colonization.  Dr. Bañuelos recommends a hard palate biopsy in the OR to get a more definitive diagnosis of potential changes in the bone.  Pt was provided with surgery schedulers contact information.    FINAL DIAGNOSIS:   ORAL CAVITY, UPPER HARD PALATE, BIOPSY:   - Necrotic tissue with food particles and bacterial colonization.   - No evidence of malignancy.     Ana Luisa KHALILN, RN  [phone]  [facility]NT   Head & Neck Surgery   10/23/2018 11:19 AM

## 2018-10-24 ENCOUNTER — TELEPHONE (OUTPATIENT)
Dept: TRANSPLANT | Facility: CLINIC | Age: 63
End: 2018-10-24

## 2018-10-24 NOTE — TELEPHONE ENCOUNTER
Return call to patient to discuss requirements for patient to be active on Kidney Transplant Wait List  Instructed patient will need to ( at a minimum)\  1) Recover from colectomy take down scheduled tentatively in mid December with a letter from surgeon that patient is cleared for transplant.  2) Recover from mouth sore with letter from ENT saying patient is free of infection and cleared for transplant  3) Updated appointment with Nephrology and labwork.  Patient stated understanding and has on his calendar to check back with Transplant Coordinator at the beginning of February 2019.

## 2018-10-25 ENCOUNTER — TELEPHONE (OUTPATIENT)
Dept: SURGERY | Facility: CLINIC | Age: 63
End: 2018-10-25

## 2018-10-25 ENCOUNTER — TELEPHONE (OUTPATIENT)
Dept: TRANSPLANT | Facility: CLINIC | Age: 63
End: 2018-10-25

## 2018-10-25 DIAGNOSIS — Z94.1 HEART REPLACED BY TRANSPLANT (H): Primary | ICD-10-CM

## 2018-10-25 DIAGNOSIS — K12.1 ORAL ULCERATION: ICD-10-CM

## 2018-10-25 RX ORDER — LIDOCAINE 40 MG/G
CREAM TOPICAL
Status: CANCELLED | OUTPATIENT
Start: 2018-10-25

## 2018-10-25 RX ORDER — CHLORHEXIDINE GLUCONATE ORAL RINSE 1.2 MG/ML
15 SOLUTION DENTAL 2 TIMES DAILY
Qty: 900 ML | Refills: 0 | Status: ON HOLD | OUTPATIENT
Start: 2018-10-25 | End: 2019-02-21

## 2018-10-25 NOTE — TELEPHONE ENCOUNTER
This writer found out that Dr. Tran would not be able to use Dr. Baron's block on 12/12/2018. This writer called the patient and left a message with this information. This writer did let the patient know that the conflict with dialysis is known. The patient was asked to call Sunita Dobbins RN for colon and rectal if he has concerns about this conflict.

## 2018-10-25 NOTE — TELEPHONE ENCOUNTER
----- Message from Genie Leija sent at 10/19/2018  4:09 PM CDT -----  Regarding: FW: Orders  Can [facility] use RDM's block on 12/12/2018?    Genie  ----- Message -----     From: Sunita Vogt RN     Sent: 10/15/2018  10:48 AM       To: Genie Leija  Subject: Orders                                           Chivo Prescott,    This patinet left before I could have him schedule. Dr. Tran wants to do a CT a/p with iv, oral and rectal contrast a few days up to 1 week prior to his unit J flex sig. Heart transplant and end stage renal so he has to be at Unit [facility]. Can you schedule the CT prior to his unit J procedure. This has to be done mid to end of November. After his Unit J scope he will need a clinic visit beginning of December to discuss surgery. Surgery orders are in too and he will need pac with that too. Does that make sense?    Thanks,  Sunita

## 2018-10-25 NOTE — TELEPHONE ENCOUNTER
Pt called to discuss lab draw for 10/26 - Non fasting with 12-hour Fk level.    States he is almost out off Chlorhexidine Gluconate. Will refill  - enc pt to cont and discuss with Dr Lawrence next week.      Reports he is feeling well; no fevers.

## 2018-10-26 ENCOUNTER — OFFICE VISIT (OUTPATIENT)
Dept: PHARMACY | Facility: CLINIC | Age: 63
End: 2018-10-26
Payer: COMMERCIAL

## 2018-10-26 DIAGNOSIS — M1A.0390 CHRONIC GOUT OF WRIST, UNSPECIFIED CAUSE, UNSPECIFIED LATERALITY: ICD-10-CM

## 2018-10-26 DIAGNOSIS — N18.5 TYPE 2 DIABETES MELLITUS WITH STAGE 5 CHRONIC KIDNEY DISEASE NOT ON CHRONIC DIALYSIS, WITHOUT LONG-TERM CURRENT USE OF INSULIN (H): ICD-10-CM

## 2018-10-26 DIAGNOSIS — Z94.1 HEART REPLACED BY TRANSPLANT (H): ICD-10-CM

## 2018-10-26 DIAGNOSIS — M10.9 GOUT, UNSPECIFIED CAUSE, UNSPECIFIED CHRONICITY, UNSPECIFIED SITE: ICD-10-CM

## 2018-10-26 DIAGNOSIS — E11.22 TYPE 2 DIABETES MELLITUS WITH STAGE 5 CHRONIC KIDNEY DISEASE NOT ON CHRONIC DIALYSIS, WITHOUT LONG-TERM CURRENT USE OF INSULIN (H): ICD-10-CM

## 2018-10-26 DIAGNOSIS — E87.6 HYPOKALEMIA: ICD-10-CM

## 2018-10-26 DIAGNOSIS — Z94.1 HEART TRANSPLANTED (H): Primary | ICD-10-CM

## 2018-10-26 LAB
ALBUMIN SERPL-MCNC: 3 G/DL (ref 3.4–5)
ALP SERPL-CCNC: 155 U/L (ref 40–150)
ALT SERPL W P-5'-P-CCNC: 19 U/L (ref 0–70)
ANION GAP SERPL CALCULATED.3IONS-SCNC: 9 MMOL/L (ref 3–14)
AST SERPL W P-5'-P-CCNC: 15 U/L (ref 0–45)
BASOPHILS # BLD AUTO: 0.2 10E9/L (ref 0–0.2)
BASOPHILS NFR BLD AUTO: 2.9 %
BILIRUB SERPL-MCNC: 0.5 MG/DL (ref 0.2–1.3)
BUN SERPL-MCNC: 25 MG/DL (ref 7–30)
CALCIUM SERPL-MCNC: 9 MG/DL (ref 8.5–10.1)
CHLORIDE SERPL-SCNC: 99 MMOL/L (ref 94–109)
CO2 SERPL-SCNC: 25 MMOL/L (ref 20–32)
CREAT SERPL-MCNC: 3.83 MG/DL (ref 0.66–1.25)
CRP SERPL-MCNC: 9.1 MG/L (ref 0–8)
DIFFERENTIAL METHOD BLD: ABNORMAL
EOSINOPHIL # BLD AUTO: 0.3 10E9/L (ref 0–0.7)
EOSINOPHIL NFR BLD AUTO: 5.1 %
ERYTHROCYTE [DISTWIDTH] IN BLOOD BY AUTOMATED COUNT: 19.9 % (ref 10–15)
GFR SERPL CREATININE-BSD FRML MDRD: 16 ML/MIN/1.7M2
GLUCOSE SERPL-MCNC: 196 MG/DL (ref 70–99)
HCT VFR BLD AUTO: 33.5 % (ref 40–53)
HGB BLD-MCNC: 10.4 G/DL (ref 13.3–17.7)
IMM GRANULOCYTES # BLD: 0 10E9/L (ref 0–0.4)
IMM GRANULOCYTES NFR BLD: 0.6 %
LYMPHOCYTES # BLD AUTO: 0.7 10E9/L (ref 0.8–5.3)
LYMPHOCYTES NFR BLD AUTO: 10.8 %
MAGNESIUM SERPL-MCNC: 1.6 MG/DL (ref 1.6–2.3)
MCH RBC QN AUTO: 29.4 PG (ref 26.5–33)
MCHC RBC AUTO-ENTMCNC: 31 G/DL (ref 31.5–36.5)
MCV RBC AUTO: 95 FL (ref 78–100)
MONOCYTES # BLD AUTO: 0.9 10E9/L (ref 0–1.3)
MONOCYTES NFR BLD AUTO: 14.8 %
NEUTROPHILS # BLD AUTO: 4.1 10E9/L (ref 1.6–8.3)
NEUTROPHILS NFR BLD AUTO: 65.8 %
NRBC # BLD AUTO: 0 10*3/UL
NRBC BLD AUTO-RTO: 0 /100
PHOSPHATE SERPL-MCNC: 2.7 MG/DL (ref 2.5–4.5)
PLATELET # BLD AUTO: 236 10E9/L (ref 150–450)
POTASSIUM SERPL-SCNC: 4.2 MMOL/L (ref 3.4–5.3)
PROT SERPL-MCNC: 7.3 G/DL (ref 6.8–8.8)
RBC # BLD AUTO: 3.54 10E12/L (ref 4.4–5.9)
SODIUM SERPL-SCNC: 132 MMOL/L (ref 133–144)
TACROLIMUS BLD-MCNC: 6.5 UG/L (ref 5–15)
TME LAST DOSE: NORMAL H
URATE SERPL-MCNC: 2.7 MG/DL (ref 3.5–7.2)
WBC # BLD AUTO: 6.3 10E9/L (ref 4–11)

## 2018-10-26 PROCEDURE — 80197 ASSAY OF TACROLIMUS: CPT

## 2018-10-26 PROCEDURE — 99606 MTMS BY PHARM EST 15 MIN: CPT | Performed by: PHARMACIST

## 2018-10-26 PROCEDURE — 87799 DETECT AGENT NOS DNA QUANT: CPT | Performed by: INTERNAL MEDICINE

## 2018-10-26 PROCEDURE — 84550 ASSAY OF BLOOD/URIC ACID: CPT

## 2018-10-26 PROCEDURE — 85025 COMPLETE CBC W/AUTO DIFF WBC: CPT | Mod: AY

## 2018-10-26 PROCEDURE — 80053 COMPREHEN METABOLIC PANEL: CPT | Mod: AY

## 2018-10-26 PROCEDURE — 84100 ASSAY OF PHOSPHORUS: CPT

## 2018-10-26 PROCEDURE — 83735 ASSAY OF MAGNESIUM: CPT

## 2018-10-26 PROCEDURE — 87799 DETECT AGENT NOS DNA QUANT: CPT

## 2018-10-26 PROCEDURE — 80197 ASSAY OF TACROLIMUS: CPT | Performed by: INTERNAL MEDICINE

## 2018-10-26 PROCEDURE — 86140 C-REACTIVE PROTEIN: CPT

## 2018-10-26 NOTE — PATIENT INSTRUCTIONS
Recommendations from today's MTM visit:                                                    MTM (medication therapy management) is a service provided by a clinical pharmacist designed to help you get the most of out of your medicines.     1. Start checking your blood sugars before lunch and dinner. Give your sliding scale Novolog at these as well as breakfast if needed. Give your NPH 12 hours apart from each other.     Next MTM visit:     To schedule another MTM appointment, please call the clinic directly or you may call the MTM scheduling line at [phone] or toll-free at [phone].     My Clinical Pharmacist's contact information:                                                      It was a pleasure talking with you today!  Please feel free to contact me with any questions or concerns you have.      Eduardo Lea, PharmD  MTM Pharmacist    Phone: [phone]     You may receive a survey about the MTM services you received.  I would appreciate your feedback to help me serve you better in the future. Please fill it out and return it when you can. Your comments will be anonymous.

## 2018-10-26 NOTE — MR AVS SNAPSHOT
After Visit Summary   10/26/2018    Murray Nicholson    MRN: [id number]           Patient Information     Date Of Birth          1955        Visit Information        Provider Department      10/26/2018 9:00 AM Eduardo Lea [facility] Medication Therapy Management        Care Instructions    Recommendations from today's MTM visit:                                                    MTM (medication therapy management) is a service provided by a clinical pharmacist designed to help you get the most of out of your medicines.     1. Start checking your blood sugars before lunch and dinner. Give your sliding scale Novolog at these as well as breakfast if needed. Give your NPH 12 hours apart from each other.     Next MTM visit:     To schedule another MTM appointment, please call the clinic directly or you may call the MTM scheduling line at [phone] or toll-free at [phone].     My Clinical Pharmacist's contact information:                                                      It was a pleasure talking with you today!  Please feel free to contact me with any questions or concerns you have.      Eduardo Lea, PharmMAURI  MTM Pharmacist    Phone: [phone]     You may receive a survey about the MTM services you received.  I would appreciate your feedback to help me serve you better in the future. Please fill it out and return it when you can. Your comments will be anonymous.                Follow-ups after your visit        Your next 10 appointments already scheduled     Oct 26, 2018  9:00 AM CDT   (Arrive by 8:45 AM)   SHORT with Eduardo Lea RPH   [facility] Medication Therapy Management ([facility])    [address] [place] [address] [phone]            Oct 31, 2018  3:30 PM CDT   (Arrive by 3:15 PM)   Return Visit with Vinayak Lawrence MD   [facility] ([facility]  [place])    [address]  [address]  [facility] [address]   [phone]            Nov 02, 2018  9:00 AM CDT   LAB with  LAB    Health Lab ([facility])    [address]  [address] [facility] [address]   [phone]           Please do not eat 10-12 hours before your appointment if you are coming in fasting for labs on lipids, cholesterol, or glucose (sugar). This does not apply to pregnant women. Water, hot tea and black coffee (with nothing added) are okay. Do not drink other fluids, diet soda or chew gum.            Nov 02, 2018  9:30 AM CDT   (Arrive by 9:15 AM)   SHORT with Eduardo Lea [facility] Medication Therapy Management ([facility])    [address] [place] [address]  [address] [facility] [address]   [phone]            Nov 05, 2018  8:00 AM CST   LAB with UU LAB GOLD WAITING   [facility])    [address] [address]              Please do not eat 10-12 hours before your appointment if you are coming in fasting for labs on lipids, cholesterol, or glucose (sugar). This does not apply to pregnant women. Water, hot tea and black coffee (with nothing added) are okay. Do not drink other fluids, diet soda or chew gum.            Nov 05, 2018  8:30 AM CST   Procedure - 2.5 hour with U2A ROOM 17   [address] [facility] ([facility])    [address] [address] [facility] [address]               Nov 05, 2018  9:30 AM CST   Heart Cath Heart Biopsy with [id number]   [facility] ([facility])    [address] [facility] [address]   [phone]            Nov 05, 2018  4:30 PM CST   (Arrive by 4:15 PM)   RETURN HEART TRANSPLANT with Klever Ernst MD   [facility] ([facility])    [address] [place] [address]  [address]  [facility] [address] [phone]            Nov 13, 2018  8:00 AM CST   (Arrive by 7:45 AM)   Return Visit with Tristan Bañuelos MD   [facility] ([facility])    [address] [id number] [phone]            Nov 16, 2018 11:30 AM CST   (Arrive by 11:15 AM)   PAC EVALUATION with MAYTE Lopez   [facility] ([facility])    [address] [place] [id number] [phone]              Who to contact     If you have questions or need follow up information about today's clinic visit or your schedule please contact [facility] MEDICATION THERAPY MANAGEMENT directly at [phone].  Normal or non-critical lab and imaging results will be communicated to you by MyChart, letter or phone within 4 business days after the clinic has received the results. If you do not hear from us within 7 days, please contact the clinic through [employer] or phone. If you have a critical or abnormal lab result, we will notify you by phone as soon as possible.  Submit refill requests through [employer] or call your pharmacy and they will forward the refill request to us. Please allow 3 business days for your refill to be completed.          Additional Information About Your Visit        MyChart Information     [employer] gives you secure access to your electronic health record. If you see a primary care provider, you can also send messages to your care team and make appointments. If you have questions, please call your primary care clinic.  If you do not have a primary care provider, please call [phone] and they will assist you.        Care EveryWhere ID     This is your Care EveryWhere ID. This could be used by other organizations to access your [facility] medical records  [id number]         Blood Pressure from Last 3 Encounters:   10/15/18 127/80   10/12/18  123/77   10/10/18 (!) 168/92    Weight from Last 3 Encounters:   10/19/18 176 lb (79.8 kg)   10/15/18 177 lb 4.8 oz (80.4 kg)   10/10/18 170 lb (77.1 kg)              Today, you had the following     No orders found for display       Primary Care Provider Office Phone # Fax #    Yahir Turcios [phone]       [id number]  [facility] [address]        Goals        Lifestyle    Increase physical activity     Notes - Note created  7/3/2018  1:51 PM by Carrie Tran, RN    Goal Statement: I will start cardiac rehab  Measure of Success: starts cardiac rehab  Supportive Steps to Achieve: support of RN CC  Barriers: none  Strengths: willingness to participate   Date to Achieve By: 7-15-18          Equal Access to Services     JOHN HAUSER : Aubrie Thomas, waaxmerry nyadaha, qaybta kaalmamerry crow, jose palencia . So [facility] [phone].    ATENCIÓN: Si habla español, tiene a ortiz disposición servicios gratuitos de asistencia lingüística. [facility] [phone].    We comply with applicable federal civil rights laws and [place] laws. We do not discriminate on the basis of race, color, national origin, age, disability, sex, sexual orientation, or gender identity.            Thank you!     Thank you for choosing [facility] MEDICATION THERAPY MANAGEMENT  for your care. Our goal is always to provide you with excellent care. Hearing back from our patients is one way we can continue to improve our services. Please take a few minutes to complete the written survey that you may receive in the mail after your visit with us. Thank you!             Your Updated Medication List - Protect others around you: Learn how to safely use, store and throw away your medicines at www.disposemymeds.org.          This list is accurate as of 10/26/18  8:16 AM.  Always use your most recent med list.                   Brand Name Dispense Instructions for use Diagnosis    albuterol 108  (90 Base) MCG/ACT inhaler    PROAIR HFA/PROVENTIL HFA/VENTOLIN HFA     Inhale 2 puffs into the lungs every 4 hours as needed for shortness of breath / dyspnea or wheezing        amLODIPine 10 MG tablet    NORVASC    90 tablet    Take 1 tablet (10 mg) by mouth daily    Hypertension goal BP (blood pressure) < 130/80       aspirin 81 MG tablet      Take 1 tablet (81 mg) by mouth at bedtime        atorvastatin 40 MG tablet    LIPITOR    90 tablet    Take 1 tablet (40 mg) by mouth daily    Heart replaced by transplant (H)       biotin 5 MG Caps    BIOTIN 5000    30 capsule    Take 5 mg by mouth daily    Brittle nails       blood glucose monitoring lancets     102 each    Use to test blood sugar 2-3 times daily or as directed.  Ok to substitute alternative if insurance prefers.    Type 2 diabetes mellitus with stage 5 chronic kidney disease not on chronic dialysis, without long-term current use of insulin (H)       blood glucose monitoring test strip    no brand specified    100 each    Use to test blood sugar 2-3 times daily or as directed.    Type 2 diabetes mellitus with stage 5 chronic kidney disease not on chronic dialysis, without long-term current use of insulin (H)       chlorhexidine 0.12 % solution    PERIDEX    900 mL    Swish and spit 15 mLs in mouth 2 times daily    Oral ulceration       ciprofloxacin 500 MG tablet    CIPRO    14 tablet    Take 1 tablet (500 mg) by mouth every 24 hours    Oral ulceration       CLONIDINE HCL PO      Take 0.1 mg by mouth At Bedtime        clotrimazole 10 MG kalpana     120 Kalpana    Place 1 Kalpana (10 mg) inside cheek 4 times daily    Candidiasis of mouth       fluticasone 50 MCG/ACT spray    FLONASE    16 g    Spray 1-2 sprays into both nostrils daily    Nasal congestion       gentian violet 1 % solution     30 mL    Take 0.5 mLs by mouth 4 times daily    Oral ulceration       hydrALAZINE 100 MG Tabs tablet    APRESOLINE    90 tablet    Take 1 tablet (100 mg) by mouth every 8  hours    Essential hypertension       insulin aspart 100 UNIT/ML injection    NovoLOG PEN    9 mL    Inject 1-7 Units Subcutaneous 4 times daily (before meals and nightly)    Type 2 diabetes mellitus with diabetic nephropathy, with long-term current use of insulin (H)       * insulin isophane human 100 UNIT/ML injection    HumuLIN N PEN    6 mL    Inject 16 Units Subcutaneous every morning (before breakfast)    Type 2 diabetes mellitus with diabetic nephropathy, with long-term current use of insulin (H)       * insulin isophane human 100 UNIT/ML injection    HumuLIN N PEN    3 mL    Inject 12 Units Subcutaneous daily (with dinner)    Type 2 diabetes mellitus with diabetic nephropathy, with long-term current use of insulin (H)       lisinopril 5 MG tablet    PRINIVIL/ZESTRIL    90 tablet    Take 1 tablet (5 mg) by mouth daily    Hypertension goal BP (blood pressure) < 130/80       loratadine 10 MG tablet    CLARITIN     Take 10 mg by mouth daily Reported on 5/3/2017    Hypertensive cardiopathy, SOB (shortness of breath)       melatonin 1 MG Tabs tablet     120 tablet    Take 2 tablets (2 mg) by mouth nightly as needed for sleep    Heart transplanted (H)       mycophenolate 250 MG capsule    GENERIC EQUIVALENT    120 capsule    Take 2 capsules (500 mg) by mouth 2 times daily    Heart transplanted (H)       NEPHROCAPS 1 MG capsule     120 capsule    Take 1 capsule by mouth daily        order for DME     1 Units    Equipment being ordered: Carol () Treatment Diagnosis: ESRD on PD Pt has to be connected to PD all night and can not be disconnected, hence impending his mobility to go to the bathroom. At risk for infection if he does not have this equipment.    CKD (chronic kidney disease) stage 5, GFR less than 15 ml/min (H)       pantoprazole 40 MG EC tablet    PROTONIX    60 tablet    Take 1 tablet (40 mg) by mouth 2 times daily (before meals)    Duodenitis       pentamidine 300 MG neb solution    NEBUPENT    300  mg    Inhale 300 mg into the lungs every 28 days Last given 9/19/18    Heart replaced by transplant (H)       polyethylene glycol Packet    MIRALAX/GLYCOLAX    7 packet    Take 17 g by mouth daily as needed for constipation    Constipation, unspecified constipation type       potassium chloride SA 20 MEQ CR tablet    KLOR-CON    3 tablet    Take 1 tablet (20 mEq) by mouth daily as needed for potassium supplementation    Heart replaced by transplant (H)       predniSONE 5 MG tablet    DELTASONE    60 tablet    Take 1 tablet (5 mg) by mouth daily    Heart transplanted (H)       rosuvastatin 20 MG tablet    CRESTOR    30 tablet    Take 1 tablet (20 mg) by mouth daily    Heart transplant recipient (H)       simethicone 80 MG chewable tablet    MYLICON    180 tablet    Take 1 tablet (80 mg) by mouth every 6 hours as needed for cramping    Flatulence, eructation and gas pain       sulfamethoxazole-trimethoprim 400-80 MG per tablet    BACTRIM/SEPTRA    14 tablet    Once per day on Tuesday, Thursday and Saturday. Take after dialysis on dialysis days.    Heart transplanted (H)       tacrolimus 1 MG capsule    GENERIC EQUIVALENT    60 capsule    Take 1 capsule (1 mg) by mouth 2 times daily    Heart transplanted (H)       traMADol 50 MG tablet    ULTRAM    10 tablet    Take 1 tablet (50 mg) by mouth every 12 hours as needed for moderate pain    Moderate pain       triamcinolone 0.1 % ointment    KENALOG    80 g    Apply topically 2 times daily    Xerosis of skin       Urea 40 % Crea     85 g    Externally apply topically daily    Brittle nails       * Notice:  This list has 2 medication(s) that are the same as other medications prescribed for you. Read the directions carefully, and ask your doctor or other care provider to review them with you.

## 2018-10-26 NOTE — PROGRESS NOTES
SUBJECTIVE/OBJECTIVE:                Murray Nicholson is a 63 year old male coming in for a follow-up visit for Medication Therapy Management.  He was referred to me from txp team for blood sugar monitoring.      Chief Complaint: Pt has been checking his blood sugars mostly after dinner, rather than before. Not checking before lunch.     Tobacco: No tobacco use  Alcohol: not currently using    Medication Adherence/Access:  Patient uses pill box(es).  Patient takes medications 4 time(s) per day. 7-7:30am, 12pm, 4-6pm, 7:30pm.   Per patient, misses medication 0 times per week. Has been taking Immunos at 7:30am and pm, since his labs are at 7:30am.   The patient fills medications at [facility]: YES.    Heart Transplant:  Current immunosuppressants include TAC 1mg BID, prednisone 5mg daily. Next week biopsy. Tremor has improved greatly, only happens occasionally.   Transplant date: 6/14/18  Estimated Creatinine Clearance: 11.8 mL/min (based on Cr of 7.09).  CMV prophylaxis: CMV mismatch, Valcyte was stopped due to neutropenia. WBC currently WNL  PCP prophylaxis: Bactrim holding due to neutropenia. Receiving Pentamidine inhalations monthly.   Antifungal Prophylaxis: Nystatin until off steroids. QID.   PPI use: Pantoprazole 40mg BID. Denies GERD sx, Duodenitis  Current supplements for electrolyte replacement:  Nephrocaps  Tx Coordinator: Bob Ulloa MD: Klever, Using Med Card: Yes  Recent Infections:  Concerns about a post OP infection. Pt is getting Vancomycin IV after Dialysis sessions.   Recent Hospitalizations: recent txp  Home BPs: See htn     Diabetes:  Pt currently taking NPH  24 units units qAM, 5 units qevening.  Has Novolog Sliding scale as well.   Novolog Sliding scale   140-189: 1 unit  190-239: 2 units  240-289: 3 units  290-339: 4 units  340-399: 5 units  400-449: 6 units  Over 450: 7 units and contact me  SMBG: two times daily.   Ranges (patient reported):     Date FBG/ 2hours post Dinner (taking right after  dinner)   10/26 139    10/25 111 192/176   10/24 116 /242   10/23 122 /252   10/22 86 /232   10/21 120 /319   10/20 122 /177   10/19 67 (no sx) /283   10/18 84 /217   10/17 84 /192     Date FBG/ 2hours post Dinner /2hours post   10/12 126    10/11 91 /234   10/10 134 /172   10/9 94 /277   10/8 91 /181   10/7 77 /71   10/6 74 /269     Patient is experiencing mild hypoglycemia in the mornings, but no sx.   Recent symptoms of high blood sugar? none  Diet/Exercise: Eating around meals a day. Spinach, potato, rotisserie chicken, Carrots dinner. Low sodium diet.      Today's Vitals: There were no vitals taken for this visit.      ASSESSMENT:              Current medications were reviewed today as discussed above.      Medication Adherence: fair, improved since last visit. Encouraged continued compliance at 8 am and 8pm for immunos.     Heart Transplant:  Stable.     Diabetes: Needs improvement. Pt has not been checking before meals and administering sliding scale. He has been giving sliding scale insulin after meals and at bedtime rather than before meals, I believe this may be contributing to some of his lower FBGs. Discussed about giving sliding scale TID before meals today.   PLAN:                  Pt to...  1. Give sliding scale insulin TID before meals rather than at bedtime.     I spent 15 minutes with this patient today. I offer these suggestions for consideration by txp team. A copy of the visit note was provided to the patient's txp provider.     Will follow up in 1 weeks.    The patient was given a summary of these recommendations as an after visit summary.    Eduardo Lea, PharmD  [facility] Pharmacist    Phone: [phone]

## 2018-10-29 ENCOUNTER — PRE VISIT (OUTPATIENT)
Dept: TRANSPLANT | Facility: CLINIC | Age: 63
End: 2018-10-29

## 2018-10-29 ENCOUNTER — TELEPHONE (OUTPATIENT)
Dept: TRANSPLANT | Facility: CLINIC | Age: 63
End: 2018-10-29

## 2018-10-29 DIAGNOSIS — Z94.1 HEART REPLACED BY TRANSPLANT (H): Primary | ICD-10-CM

## 2018-10-29 NOTE — TELEPHONE ENCOUNTER
"Pt called with labs results.   FK 6.5 - goal -68; no dose change  WBC 6.3 after increasing MMF  CRP 9.1- conts to trend down.    Will plan to recheck labs on Monday 11/5 prior to biopsy.     Pt reports feeling stronger; no fever. Pt noticed that since Dr Bañuelos cut off the lesion in his mouth it starting to come back in another spot. \"it is small, but I can feel it\".  Pt will have ID look at it on Wed and will discuss whether to cont antibiotics and flu shot.     Enc to call with questions and concerns.    "

## 2018-10-30 LAB
EBV DNA # SPEC NAA+PROBE: NORMAL {COPIES}/ML
EBV DNA SPEC NAA+PROBE-LOG#: NORMAL {LOG_COPIES}/ML

## 2018-10-31 ENCOUNTER — OFFICE VISIT (OUTPATIENT)
Dept: INFECTIOUS DISEASES | Facility: CLINIC | Age: 63
End: 2018-10-31
Attending: INTERNAL MEDICINE
Payer: MEDICARE

## 2018-10-31 VITALS
SYSTOLIC BLOOD PRESSURE: 125 MMHG | WEIGHT: 171.6 LBS | DIASTOLIC BLOOD PRESSURE: 71 MMHG | HEART RATE: 94 BPM | OXYGEN SATURATION: 100 % | TEMPERATURE: 98 F | BODY MASS INDEX: 25.34 KG/M2

## 2018-10-31 DIAGNOSIS — A49.8 KLEBSIELLA PNEUMONIAE INFECTION: ICD-10-CM

## 2018-10-31 DIAGNOSIS — M86.60 CHRONIC OSTEOMYELITIS (H): Primary | ICD-10-CM

## 2018-10-31 DIAGNOSIS — Z90.49 S/P PARTIAL COLECTOMY: ICD-10-CM

## 2018-10-31 DIAGNOSIS — K12.1 ORAL ULCERATION: ICD-10-CM

## 2018-10-31 DIAGNOSIS — Z94.1 HEART TRANSPLANT RECIPIENT (H): ICD-10-CM

## 2018-10-31 DIAGNOSIS — Z23 NEED FOR INFLUENZA VACCINATION: ICD-10-CM

## 2018-10-31 PROCEDURE — G0463 HOSPITAL OUTPT CLINIC VISIT: HCPCS | Mod: 25,ZF

## 2018-10-31 PROCEDURE — 90686 IIV4 VACC NO PRSV 0.5 ML IM: CPT | Mod: ZF | Performed by: INTERNAL MEDICINE

## 2018-10-31 PROCEDURE — 25000128 H RX IP 250 OP 636: Mod: ZF | Performed by: INTERNAL MEDICINE

## 2018-10-31 PROCEDURE — G0008 ADMIN INFLUENZA VIRUS VAC: HCPCS | Mod: ZF

## 2018-10-31 RX ORDER — CIPROFLOXACIN 500 MG/1
500 TABLET, FILM COATED ORAL EVERY 24 HOURS
Qty: 14 TABLET | Refills: 0 | Status: SHIPPED | OUTPATIENT
Start: 2018-10-31 | End: 2018-12-03

## 2018-10-31 RX ADMIN — INFLUENZA A VIRUS A/MICHIGAN/45/2015 X-275 (H1N1) ANTIGEN (FORMALDEHYDE INACTIVATED), INFLUENZA A VIRUS A/SINGAPORE/INFIMH-16-0019/2016 IVR-186 (H3N2) ANTIGEN (FORMALDEHYDE INACTIVATED), INFLUENZA B VIRUS B/PHUKET/3073/2013 ANTIGEN (FORMALDEHYDE INACTIVATED), AND INFLUENZA B VIRUS B/MARYLAND/15/2016 BX-69A ANTIGEN (FORMALDEHYDE INACTIVATED) 0.5 ML: 15; 15; 15; 15 INJECTION, SUSPENSION INTRAMUSCULAR at 16:28

## 2018-10-31 ASSESSMENT — PAIN SCALES - GENERAL: PAINLEVEL: NO PAIN (0)

## 2018-10-31 NOTE — NURSING NOTE
Chief Complaint   Patient presents with     RECHECK     Follow Up Hospital     /71  Pulse 94  Temp 98  F (36.7  C) (Oral)  Wt 77.8 kg (171 lb 9.6 oz)  SpO2 100%  BMI 25.34 kg/m2   Marcie Saul

## 2018-10-31 NOTE — MR AVS SNAPSHOT
After Visit Summary   10/31/2018    Murray Nicholson    MRN: [id number]           Patient Information     Date Of Birth          1955        Visit Information        Provider Department      10/31/2018 3:30 PM Vinayak Lawrence MD [facility]        Today's Diagnoses     Need for influenza vaccination    -  1    Oral ulceration           Follow-ups after your visit        Follow-up notes from your care team     Return if symptoms worsen or fail to improve.      Your next 10 appointments already scheduled     Nov 02, 2018  9:00 AM CDT   LAB with  LAB   [facility] ([facility])    [address] [address] [phone]           Please do not eat 10-12 hours before your appointment if you are coming in fasting for labs on lipids, cholesterol, or glucose (sugar). This does not apply to pregnant women. Water, hot tea and black coffee (with nothing added) are okay. Do not drink other fluids, diet soda or chew gum.            Nov 05, 2018  8:00 AM CST   LAB with UU LAB GOLD WAITING   [facility] Lab ([facility])    [address] [facility] [address]              Please do not eat 10-12 hours before your appointment if you are coming in fasting for labs on lipids, cholesterol, or glucose (sugar). This does not apply to pregnant women. Water, hot tea and black coffee (with nothing added) are okay. Do not drink other fluids, diet soda or chew gum.            Nov 05, 2018  8:30 AM CST   Procedure - 2.5 hour with U2A ROOM 17   [address] [facility] ([facility])    [address] [facility] [address]               Nov 05, 2018  9:30 AM CST   Heart Cath Heart Biopsy with UUHCVR4   [facility]  Heart Cath Lab ([facility])    [address] [facility] [address]    [phone]            Nov 05, 2018  2:30 PM CST   (Arrive by 2:15 PM)   SHORT with Eduardo Lea RPH   [facility] Medication Therapy Management ([facility])    [address] [facility]  3rd Floor  [facility] [address]   [phone]            Nov 05, 2018  4:30 PM CST   (Arrive by 4:15 PM)   RETURN HEART TRANSPLANT with Klever Ernst MD   [facility] Heart Care ([facility])    [address] [place] [address]  [address]  [facility] [address]   [phone]            Nov 13, 2018  8:00 AM CST   (Arrive by 7:45 AM)   Return Visit with Tristan Bañuelos MD   [facility] Ear Nose and Throat ([facility])    [address] [place] [address]  [address] [facility] [address]   [phone]            Nov 16, 2018  9:40 AM CST   CT CHEST ABDOMEN PELVIS W/O & W CONTRAST with UCCT2   [facility] CT ([facility])    [address] [place] [address]  [address] [facility] [address]   [phone]           How do I prepare for my exam? (Food and drink instructions) To prepare: Do not eat or drink for 2 hours before your exam. If you need to take medicine, you may take it with small sips of water. (We may ask you to take liquid medicine as well.)  How do I prepare for my exam? (Other instructions) Please arrive 30 minutes early for your CT.  Once in the department you might be asked to drink water 15-20 minutes prior to your exam.  If indicated you may be asked to drink an oral contrast in advance of your CT.  If this is the case, the imaging team will let you know or be in contact with you prior to your appointment  Patients over 70 or patients with diabetes or kidney problems: If you haven t had a blood test (creatinine test) within the last 30 days, the Cardiologist/Radiologist may require you to get this test prior to your exam.  If you have diabetes:  Continue to take your metformin medication on the day of your exam   What should I wear: Please wear loose clothing, such as a sweat suit or jogging clothes. Avoid snaps, zippers and other metal. We may ask you to undress and put on a hospital gown.  How long does the exam take: Most scans take less than 20 minutes.  What should I bring: Please bring any scans or X-rays taken at other hospitals, if similar tests were done. Also bring a list of your medicines, including vitamins, minerals and over-the-counter drugs. It is safest to leave personal items at home.  Do I need a : No  is needed.  What do I need to tell my doctor? Be sure to tell your doctor: * If you have any allergies. * If there s any chance you are pregnant. * If you are breastfeeding.  What should I do after the exam: No restrictions, You may resume normal activities.  What is this test: A CT (computed tomography) scan is a series of pictures that allows us to look inside your body. The scanner creates images of the body in cross sections, much like slices of bread. This helps us see any problems more clearly. You may receive contrast (X-ray dye) before or during your scan. You will be asked to drink the contrast.  Who should I call with questions: If you have any questions, please call the Imaging Department where you will have your exam. Directions, parking instructions, and other information is available on our website, [email or web address].            Nov 16, 2018 11:30 AM CST   (Arrive by 11:15 AM)   PAC EVALUATION with MAYTE Lopez   [facility] ([facility])    [address] [facility]  4th Floor  [facility] [id number] [phone]            Nov 21, 2018   Procedure with Rick Tran MD   [facility] [place], Same Day Surgery (--)    [address] [facility] [id number] [phone]              Who to contact     If you have questions or need follow up information about today's clinic visit or your schedule please  contact [facility] INFECTIOUS DISEAS[facility] directly at [phone].  Normal or non-critical lab and imaging results will be communicated to you by MyChart, letter or phone within 4 business days after the clinic has received the results. If you do not hear from us within 7 days, please contact the clinic through MyChart or phone. If you have a critical or abnormal lab result, we will notify you by phone as soon as possible.  Submit refill requests through [employer] or call your pharmacy and they will forward the refill request to us. Please allow 3 business days for your refill to be completed.          Additional Information About Your Visit        [employer] Information     [employer] gives you secure access to your electronic health record. If you see a primary care provider, you can also send messages to your care team and make appointments. If you have questions, please call your primary care clinic.  If you do not have a primary care provider, please call [phone] and they will assist you.        Care EveryWhere ID     This is your Care EveryWhere ID. This could be used by other organizations to access your [place] medical records  [id number]        Your Vitals Were     Pulse Temperature Pulse Oximetry BMI (Body Mass Index)          94 98  F (36.7  C) (Oral) 100% 25.34 kg/m2         Blood Pressure from Last 3 Encounters:   10/31/18 125/71   10/15/18 127/80   10/12/18 123/77    Weight from Last 3 Encounters:   10/31/18 77.8 kg (171 lb 9.6 oz)   10/19/18 79.8 kg (176 lb)   10/15/18 80.4 kg (177 lb 4.8 oz)              Today, you had the following     No orders found for display         Where to get your medicines      These medications were sent to [place], [place] - [address]  [address], [facility] [address]    Hours:  TRANSPLANT PHONE NUMBER [phone] Phone:  [phone]     ciprofloxacin 500 MG tablet          Primary  Care Provider Office Phone # Fax #    Yahir Alex Turcios [phone]       [address]  [facility] [address]        Goals        Lifestyle    Increase physical activity     Notes - Note created  7/3/2018  1:51 PM by Carrie Tran, RN    Goal Statement: I will start cardiac rehab  Measure of Success: starts cardiac rehab  Supportive Steps to Achieve: support of RN CC  Barriers: none  Strengths: willingness to participate   Date to Achieve By: 7-15-18          Equal Access to Services     JOHN HAUSER AH: Hadii aad ku hadasho Soomaali, waaxda luqadaha, qaybta kaalmada adeegyada, waxay idiin hayaan adeeg kharash la'aan . So [facility] [phone].    ATENCIÓN: Si habla español, tiene a ortiz disposición servicios gratuitos de asistencia lingüística. Llame al [phone].    We comply with applicable federal civil rights laws and [place] laws. We do not discriminate on the basis of race, color, national origin, age, disability, sex, sexual orientation, or gender identity.            Thank you!     Thank you for choosing [facility] AND INFECTIOUS DISEASES  for your care. Our goal is always to provide you with excellent care. Hearing back from our patients is one way we can continue to improve our services. Please take a few minutes to complete the written survey that you may receive in the mail after your visit with us. Thank you!             Your Updated Medication List - Protect others around you: Learn how to safely use, store and throw away your medicines at www.disposemymeds.org.          This list is accurate as of 10/31/18  4:38 PM.  Always use your most recent med list.                   Brand Name Dispense Instructions for use Diagnosis    albuterol 108 (90 Base) MCG/ACT inhaler    PROAIR HFA/PROVENTIL HFA/VENTOLIN HFA     Inhale 2 puffs into the lungs every 4 hours as needed for shortness of breath / dyspnea or wheezing        amLODIPine 10 MG tablet    NORVASC    90 tablet    Take  1 tablet (10 mg) by mouth daily    Hypertension goal BP (blood pressure) < 130/80       aspirin 81 MG tablet      Take 1 tablet (81 mg) by mouth at bedtime        atorvastatin 40 MG tablet    LIPITOR    90 tablet    Take 1 tablet (40 mg) by mouth daily    Heart replaced by transplant (H)       biotin 5 MG Caps    BIOTIN 5000    30 capsule    Take 5 mg by mouth daily    Brittle nails       blood glucose monitoring lancets     102 each    Use to test blood sugar 2-3 times daily or as directed.  Ok to substitute alternative if insurance prefers.    Type 2 diabetes mellitus with stage 5 chronic kidney disease not on chronic dialysis, without long-term current use of insulin (H)       blood glucose monitoring test strip    no brand specified    100 each    Use to test blood sugar 2-3 times daily or as directed.    Type 2 diabetes mellitus with stage 5 chronic kidney disease not on chronic dialysis, without long-term current use of insulin (H)       chlorhexidine 0.12 % solution    PERIDEX    900 mL    Swish and spit 15 mLs in mouth 2 times daily    Oral ulceration       ciprofloxacin 500 MG tablet    CIPRO    14 tablet    Take 1 tablet (500 mg) by mouth every 24 hours    Oral ulceration       CLONIDINE HCL PO      Take 0.1 mg by mouth At Bedtime        clotrimazole 10 MG kalpana     120 Kalpana    Place 1 Kalpana (10 mg) inside cheek 4 times daily    Candidiasis of mouth       fluticasone 50 MCG/ACT spray    FLONASE    16 g    Spray 1-2 sprays into both nostrils daily    Nasal congestion       gentian violet 1 % solution     30 mL    Take 0.5 mLs by mouth 4 times daily    Oral ulceration       hydrALAZINE 100 MG Tabs tablet    APRESOLINE    90 tablet    Take 1 tablet (100 mg) by mouth every 8 hours    Essential hypertension       insulin aspart 100 UNIT/ML injection    NovoLOG PEN    9 mL    Inject 1-7 Units Subcutaneous 4 times daily (before meals and nightly)    Type 2 diabetes mellitus with diabetic nephropathy, with  long-term current use of insulin (H)       * insulin isophane human 100 UNIT/ML injection    HumuLIN N PEN    6 mL    Inject 16 Units Subcutaneous every morning (before breakfast)    Type 2 diabetes mellitus with diabetic nephropathy, with long-term current use of insulin (H)       * insulin isophane human 100 UNIT/ML injection    HumuLIN N PEN    3 mL    Inject 12 Units Subcutaneous daily (with dinner)    Type 2 diabetes mellitus with diabetic nephropathy, with long-term current use of insulin (H)       lisinopril 5 MG tablet    PRINIVIL/ZESTRIL    90 tablet    Take 1 tablet (5 mg) by mouth daily    Hypertension goal BP (blood pressure) < 130/80       loratadine 10 MG tablet    CLARITIN     Take 10 mg by mouth daily Reported on 5/3/2017    Hypertensive cardiopathy, SOB (shortness of breath)       melatonin 1 MG Tabs tablet     120 tablet    Take 2 tablets (2 mg) by mouth nightly as needed for sleep    Heart transplanted (H)       mycophenolate 250 MG capsule    GENERIC EQUIVALENT    120 capsule    Take 2 capsules (500 mg) by mouth 2 times daily    Heart transplanted (H)       NEPHROCAPS 1 MG capsule     120 capsule    Take 1 capsule by mouth daily        order for DME     1 Units    Equipment being ordered: Carol () Treatment Diagnosis: ESRD on PD Pt has to be connected to PD all night and can not be disconnected, hence impending his mobility to go to the bathroom. At risk for infection if he does not have this equipment.    CKD (chronic kidney disease) stage 5, GFR less than 15 ml/min (H)       pantoprazole 40 MG EC tablet    PROTONIX    60 tablet    Take 1 tablet (40 mg) by mouth 2 times daily (before meals)    Duodenitis       pentamidine 300 MG neb solution    NEBUPENT    300 mg    Inhale 300 mg into the lungs every 28 days Last given 9/19/18    Heart replaced by transplant (H)       polyethylene glycol Packet    MIRALAX/GLYCOLAX    7 packet    Take 17 g by mouth daily as needed for constipation     Constipation, unspecified constipation type       potassium chloride SA 20 MEQ CR tablet    KLOR-CON    3 tablet    Take 1 tablet (20 mEq) by mouth daily as needed for potassium supplementation    Heart replaced by transplant (H)       predniSONE 5 MG tablet    DELTASONE    60 tablet    Take 1 tablet (5 mg) by mouth daily    Heart transplanted (H)       rosuvastatin 20 MG tablet    CRESTOR    30 tablet    Take 1 tablet (20 mg) by mouth daily    Heart transplant recipient (H)       simethicone 80 MG chewable tablet    MYLICON    180 tablet    Take 1 tablet (80 mg) by mouth every 6 hours as needed for cramping    Flatulence, eructation and gas pain       sulfamethoxazole-trimethoprim 400-80 MG per tablet    BACTRIM/SEPTRA    14 tablet    Once per day on Tuesday, Thursday and Saturday. Take after dialysis on dialysis days.    Heart transplanted (H)       tacrolimus 1 MG capsule    GENERIC EQUIVALENT    60 capsule    Take 1 capsule (1 mg) by mouth 2 times daily    Heart transplanted (H)       traMADol 50 MG tablet    ULTRAM    10 tablet    Take 1 tablet (50 mg) by mouth every 12 hours as needed for moderate pain    Moderate pain       triamcinolone 0.1 % ointment    KENALOG    80 g    Apply topically 2 times daily    Xerosis of skin       Urea 40 % Crea     85 g    Externally apply topically daily    Brittle nails       * Notice:  This list has 2 medication(s) that are the same as other medications prescribed for you. Read the directions carefully, and ask your doctor or other care provider to review them with you.

## 2018-11-01 DIAGNOSIS — Z45.2 ADJUSTMENT AND MANAGEMENT OF VASCULAR ACCESS DEVICE: ICD-10-CM

## 2018-11-01 DIAGNOSIS — Z94.3 S/P HEART AND LUNG TRANSPLANT (H): ICD-10-CM

## 2018-11-01 DIAGNOSIS — N18.6 ESRD ON DIALYSIS (H): Primary | ICD-10-CM

## 2018-11-01 DIAGNOSIS — Z01.818 ENCOUNTER FOR OTHER PREPROCEDURAL EXAMINATION: ICD-10-CM

## 2018-11-01 DIAGNOSIS — Z99.2 ESRD ON DIALYSIS (H): Primary | ICD-10-CM

## 2018-11-02 DIAGNOSIS — Z94.1 HEART REPLACED BY TRANSPLANT (H): ICD-10-CM

## 2018-11-05 ENCOUNTER — HOSPITAL ENCOUNTER (OUTPATIENT)
Facility: CLINIC | Age: 63
Discharge: HOME | End: 2018-11-05
Attending: INTERNAL MEDICINE | Admitting: INTERNAL MEDICINE
Payer: MEDICARE

## 2018-11-05 ENCOUNTER — OFFICE VISIT (OUTPATIENT)
Dept: CARDIOLOGY | Facility: CLINIC | Age: 63
End: 2018-11-05
Attending: INTERNAL MEDICINE
Payer: MEDICARE

## 2018-11-05 ENCOUNTER — OFFICE VISIT (OUTPATIENT)
Dept: PHARMACY | Facility: CLINIC | Age: 63
End: 2018-11-05
Payer: COMMERCIAL

## 2018-11-05 ENCOUNTER — APPOINTMENT (OUTPATIENT)
Dept: MEDSURG UNIT | Facility: CLINIC | Age: 63
End: 2018-11-05
Attending: INTERNAL MEDICINE
Payer: MEDICARE

## 2018-11-05 VITALS
HEIGHT: 69 IN | SYSTOLIC BLOOD PRESSURE: 168 MMHG | RESPIRATION RATE: 16 BRPM | TEMPERATURE: 97.7 F | HEART RATE: 102 BPM | BODY MASS INDEX: 25.14 KG/M2 | DIASTOLIC BLOOD PRESSURE: 94 MMHG | OXYGEN SATURATION: 97 % | WEIGHT: 169.75 LBS

## 2018-11-05 VITALS
OXYGEN SATURATION: 100 % | SYSTOLIC BLOOD PRESSURE: 132 MMHG | WEIGHT: 175 LBS | BODY MASS INDEX: 25.92 KG/M2 | HEART RATE: 101 BPM | DIASTOLIC BLOOD PRESSURE: 77 MMHG | HEIGHT: 69 IN

## 2018-11-05 DIAGNOSIS — E11.22 TYPE 2 DIABETES MELLITUS WITH STAGE 5 CHRONIC KIDNEY DISEASE NOT ON CHRONIC DIALYSIS, WITHOUT LONG-TERM CURRENT USE OF INSULIN (H): ICD-10-CM

## 2018-11-05 DIAGNOSIS — Z94.1 HEART TRANSPLANT RECIPIENT (H): Primary | ICD-10-CM

## 2018-11-05 DIAGNOSIS — Z94.1 HEART REPLACED BY TRANSPLANT (H): ICD-10-CM

## 2018-11-05 DIAGNOSIS — G47.10 HYPERSOMNIA WITH SLEEP APNEA: ICD-10-CM

## 2018-11-05 DIAGNOSIS — G47.30 HYPERSOMNIA WITH SLEEP APNEA: ICD-10-CM

## 2018-11-05 DIAGNOSIS — Z94.1 HEART TRANSPLANTED (H): Primary | ICD-10-CM

## 2018-11-05 DIAGNOSIS — I10 HYPERTENSION: Primary | ICD-10-CM

## 2018-11-05 DIAGNOSIS — N18.5 TYPE 2 DIABETES MELLITUS WITH STAGE 5 CHRONIC KIDNEY DISEASE NOT ON CHRONIC DIALYSIS, WITHOUT LONG-TERM CURRENT USE OF INSULIN (H): ICD-10-CM

## 2018-11-05 LAB
ANION GAP SERPL CALCULATED.3IONS-SCNC: 8 MMOL/L (ref 3–14)
BUN SERPL-MCNC: 46 MG/DL (ref 7–30)
CALCIUM SERPL-MCNC: 9.7 MG/DL (ref 8.5–10.1)
CHLORIDE SERPL-SCNC: 103 MMOL/L (ref 94–109)
CO2 SERPL-SCNC: 26 MMOL/L (ref 20–32)
CREAT SERPL-MCNC: 6.2 MG/DL (ref 0.66–1.25)
CRP SERPL-MCNC: 11 MG/L (ref 0–8)
ERYTHROCYTE [DISTWIDTH] IN BLOOD BY AUTOMATED COUNT: 19.7 % (ref 10–15)
GFR SERPL CREATININE-BSD FRML MDRD: 9 ML/MIN/1.7M2
GLUCOSE SERPL-MCNC: 94 MG/DL (ref 70–99)
HCT VFR BLD AUTO: 37.2 % (ref 40–53)
HGB BLD-MCNC: 11.4 G/DL (ref 13.3–17.7)
MAGNESIUM SERPL-MCNC: 1.7 MG/DL (ref 1.6–2.3)
MCH RBC QN AUTO: 29.5 PG (ref 26.5–33)
MCHC RBC AUTO-ENTMCNC: 30.6 G/DL (ref 31.5–36.5)
MCV RBC AUTO: 96 FL (ref 78–100)
PHOSPHATE SERPL-MCNC: 4.2 MG/DL (ref 2.5–4.5)
PLATELET # BLD AUTO: 275 10E9/L (ref 150–450)
POTASSIUM SERPL-SCNC: 3.9 MMOL/L (ref 3.4–5.3)
RBC # BLD AUTO: 3.87 10E12/L (ref 4.4–5.9)
SODIUM SERPL-SCNC: 137 MMOL/L (ref 133–144)
TACROLIMUS BLD-MCNC: 7.6 UG/L (ref 5–15)
TME LAST DOSE: NORMAL H
WBC # BLD AUTO: 5.6 10E9/L (ref 4–11)

## 2018-11-05 PROCEDURE — 25000125 ZZHC RX 250: Performed by: INTERNAL MEDICINE

## 2018-11-05 PROCEDURE — 83735 ASSAY OF MAGNESIUM: CPT | Performed by: INTERNAL MEDICINE

## 2018-11-05 PROCEDURE — 88350 IMFLUOR EA ADDL 1ANTB STN PX: CPT | Performed by: INTERNAL MEDICINE

## 2018-11-05 PROCEDURE — 80048 BASIC METABOLIC PNL TOTAL CA: CPT | Performed by: INTERNAL MEDICINE

## 2018-11-05 PROCEDURE — 86352 CELL FUNCTION ASSAY W/STIM: CPT | Performed by: INTERNAL MEDICINE

## 2018-11-05 PROCEDURE — 80197 ASSAY OF TACROLIMUS: CPT | Performed by: INTERNAL MEDICINE

## 2018-11-05 PROCEDURE — G0463 HOSPITAL OUTPT CLINIC VISIT: HCPCS | Mod: 25

## 2018-11-05 PROCEDURE — 88346 IMFLUOR 1ST 1ANTB STAIN PX: CPT | Performed by: INTERNAL MEDICINE

## 2018-11-05 PROCEDURE — 36415 COLL VENOUS BLD VENIPUNCTURE: CPT | Performed by: INTERNAL MEDICINE

## 2018-11-05 PROCEDURE — 99215 OFFICE O/P EST HI 40 MIN: CPT | Mod: ZP | Performed by: INTERNAL MEDICINE

## 2018-11-05 PROCEDURE — 93505 ENDOMYOCARDIAL BIOPSY: CPT | Mod: 26 | Performed by: INTERNAL MEDICINE

## 2018-11-05 PROCEDURE — 86140 C-REACTIVE PROTEIN: CPT | Performed by: INTERNAL MEDICINE

## 2018-11-05 PROCEDURE — 88307 TISSUE EXAM BY PATHOLOGIST: CPT | Performed by: INTERNAL MEDICINE

## 2018-11-05 PROCEDURE — 99606 MTMS BY PHARM EST 15 MIN: CPT | Performed by: PHARMACIST

## 2018-11-05 PROCEDURE — 40000166 ZZH STATISTIC PP CARE STAGE 1

## 2018-11-05 PROCEDURE — G0463 HOSPITAL OUTPT CLINIC VISIT: HCPCS | Mod: ZF

## 2018-11-05 PROCEDURE — 85027 COMPLETE CBC AUTOMATED: CPT | Performed by: INTERNAL MEDICINE

## 2018-11-05 PROCEDURE — 84100 ASSAY OF PHOSPHORUS: CPT | Performed by: INTERNAL MEDICINE

## 2018-11-05 RX ORDER — LIDOCAINE 40 MG/G
CREAM TOPICAL
Status: COMPLETED | OUTPATIENT
Start: 2018-11-05 | End: 2018-11-05

## 2018-11-05 RX ADMIN — LIDOCAINE: 40 CREAM TOPICAL at 08:58

## 2018-11-05 ASSESSMENT — PAIN SCALES - GENERAL: PAINLEVEL: NO PAIN (0)

## 2018-11-05 NOTE — DISCHARGE INSTRUCTIONS
[facility]                        Interventional Cardiology  Discharge Instructions   Post Right Heart Cath      AFTER YOU GO HOME:    DO drink plenty of fluids    DO resume your regular diet and medications unless otherwise instructed by your Primary Physician    Do Not scrub the procedure site vigorously    No lotion or powder to the puncture site for 3 days    CALL YOUR PRIMARY PHYSICIAN IF: You may resume all normal activity.  Monitor neck site for bleeding, swelling, or voice changes. If you notice bleeding or swelling immediately apply pressure to the site and call number below to speak with Cardiology Fellow.  If you experience any changes in your breathing you should call your doctor immediately or come to the closest Emergency Department.  Do not drive yourself.    ADDITIONAL INSTRUCTIONS: Medications: You are to resume all home medications including anticoagulation therapy unless otherwise advised by your primary cardiologist or nurse coordinator.    Follow Up: Per your primary cardiology team    If you have any questions or concerns regarding your procedure site please call [phone] at anytime and ask for Cardiology Fellow on call.  They are available 24 hours a day.  You may also contact the Cardiology Clinic after hours number at [phone].                                                       Telephone Numbers [phone] Monday-Friday 8:00 am to 4:30 pm    [phone] After 4:30 pm Monday-Friday, Weekends & Holidays  Ask for Interventional Cardiologist on call. Someone is on call 24 hours/day   [facility] toll free number [phone] Monday-Friday 8:00 am to 4:30 pm   [facility] Emergency Dept [phone]

## 2018-11-05 NOTE — LETTER
2018      RE: Murray Nicholson  [address]  [place] [address]       2018     Yahir Turcios MD    [facility]    [address] [place]  [address]       Ana Luisa Mcpherson MD    [facility]    [address] [facility] [address]    [place]  [address]       RE: Murray Nicholson   MRN: [id number]   : 1955      Dear Dr. Turcios and Dr. Mcpherson:      We had the pleasure of seeing Mr. Murray Nicholson for followup in our Cardiac Transplant Clinic at the [facility].  As you know, he is a very pleasant 63-year-old male who underwent orthotopic heart transplantation on 2018.  He is currently listed for kidney transplantation.  He had a very complex postoperative course.  His current problem list includes:     1.  Status post orthotopic heart transplantation.   2.  Neutropenia.   3.  Oral mucosal ulcer secondary to neutropenia with Klebsiella infection.   4.  Pseudomonas bacteremia, resolved.   5.  Perforation of the small bowel, status post small-bowel resection and ileostomy.   6.  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   7.  Hypertension.   8.  Hyperlipidemia.   9.  End-stage renal disease requiring hemodialysis.      He returns today for follow up.  He is doing okay.  He has not no specific complaints.  He no longer has fever or chills.  Is tolerating the dialysis without any significant side effects.  His white blood cell has remained stable after restarting CellCept.  He was seen by ENT and had an excision of this lesion in his hard palate.  He was also seen by transplant infectious disease and recommended to continue ciprofloxacin for additional 3 weeks.  His CRP is downtrending and low.  Is scheduled for a retake of his colostomy second week of December.  His weekly CMV PCR has remind negative.       Current Outpatient Prescriptions   Medication Sig     albuterol (PROAIR HFA/PROVENTIL HFA/VENTOLIN HFA) 108 (90 Base) MCG/ACT inhaler  Inhale 2 puffs into the lungs every 4 hours as needed for shortness of breath / dyspnea or wheezing     amLODIPine (NORVASC) 10 MG tablet Take 1 tablet (10 mg) by mouth daily     ASPIRIN 81 MG OR TABS Take 1 tablet (81 mg) by mouth at bedtime     atorvastatin (LIPITOR) 40 MG tablet Take 1 tablet (40 mg) by mouth daily     biotin (BIOTIN 5000) 5 MG CAPS Take 5 mg by mouth daily     blood glucose monitoring (ACCU-CHEK FASTCLIX) lancets Use to test blood sugar 2-3 times daily or as directed.  Ok to substitute alternative if insurance prefers.     blood glucose monitoring (NO BRAND SPECIFIED) test strip Use to test blood sugar 2-3 times daily or as directed.     chlorhexidine (PERIDEX) 0.12 % solution Swish and spit 15 mLs in mouth 2 times daily     ciprofloxacin (CIPRO) 500 MG tablet Take 1 tablet (500 mg) by mouth every 24 hours     cloNIDine (CATAPRES) 0.1 MG tablet Take 1 tablet (0.1 mg) by mouth At Bedtime     clotrimazole 10 MG kalpana Place 1 Kalpana (10 mg) inside cheek 4 times daily     fluticasone (FLONASE) 50 MCG/ACT spray Spray 1-2 sprays into both nostrils daily     gentian violet 1 % solution Take 0.5 mLs by mouth 4 times daily     hydrALAZINE (APRESOLINE) 100 MG TABS tablet Take 1 tablet (100 mg) by mouth every 8 hours     insulin aspart (NOVOLOG PEN) 100 UNIT/ML injection Inject 1-7 Units Subcutaneous 4 times daily (before meals and nightly)     insulin isophane human (HUMULIN N PEN) 100 UNIT/ML injection Inject 16 Units Subcutaneous every morning (before breakfast)     insulin isophane human (HUMULIN N PEN) 100 UNIT/ML injection Inject 12 Units Subcutaneous daily (with dinner)     lisinopril (PRINIVIL/ZESTRIL) 5 MG tablet Take 1 tablet (5 mg) by mouth daily     loratadine (CLARITIN) 10 MG tablet Take 10 mg by mouth daily Reported on 5/3/2017     melatonin 1 MG TABS tablet Take 2 tablets (2 mg) by mouth nightly as needed for sleep     mycophenolate (GENERIC EQUIVALENT) 250 MG capsule Take 2 capsules (500  mg) by mouth 2 times daily     NEPHROCAPS 1 MG capsule Take 1 capsule by mouth daily     order for DME Equipment being ordered: Carol ()  Treatment Diagnosis: ESRD on PD  Pt has to be connected to PD all night and can not be disconnected, hence impending his mobility to go to the bathroom. At risk for infection if he does not have this equipment.     pantoprazole (PROTONIX) 40 MG EC tablet Take 1 tablet (40 mg) by mouth 2 times daily (before meals)     pentamidine (NEBUPENT) 300 MG neb solution Inhale 300 mg into the lungs every 28 days Last given 9/19/18     polyethylene glycol (MIRALAX/GLYCOLAX) Packet Take 17 g by mouth daily as needed for constipation     potassium chloride SA (KLOR-CON) 20 MEQ CR tablet Take 1 tablet (20 mEq) by mouth daily as needed for potassium supplementation (Patient not taking: Reported on 10/31/2018)     predniSONE (DELTASONE) 5 MG tablet Take 1 tablet (5 mg) by mouth daily     rosuvastatin (CRESTOR) 20 MG tablet Take 1 tablet (20 mg) by mouth daily (Patient not taking: Reported on 10/31/2018)     simethicone (MYLICON) 80 MG chewable tablet Take 1 tablet (80 mg) by mouth every 6 hours as needed for cramping     sulfamethoxazole-trimethoprim (BACTRIM/SEPTRA) 400-80 MG per tablet Once per day on Tuesday, Thursday and Saturday. Take after dialysis on dialysis days.     tacrolimus (GENERIC EQUIVALENT) 1 MG capsule Take 1 capsule (1 mg) by mouth 2 times daily     traMADol (ULTRAM) 50 MG tablet Take 1 tablet (50 mg) by mouth every 12 hours as needed for moderate pain     triamcinolone (KENALOG) 0.1 % ointment Apply topically 2 times daily     Urea 40 % CREA Externally apply topically daily     No current facility-administered medications for this visit.         REVIEW OF SYSTEMS:  A detailed 10-point review of systems was obtained as described in History of Present Illness.  All other systems are reviewed and are negative.      PHYSICAL EXAMINATION:   GENERAL:  He was comfortable.  He  "was in no apparent distress.   /77 (BP Location: Left arm, Patient Position: Chair, Cuff Size: Adult Large)  Pulse 101  Ht 1.753 m (5' 9\")  Wt 79.4 kg (175 lb)  SpO2 100%  BMI 25.84 kg/m2  \HEENT:  No pallor, cyanosis, or jaundice.  Examination of the oral cavity revealed an ulcerative lesion on the upper palate covered with granulation tissue, which looks better when compared to last week.  Otherwise, oral cavity examination is normal.   NECK:  Neck exam reveals no jugular venous distention.  Carotids were 2+.  No lymphadenopathy.   LUNGS:  Clear to auscultation bilaterally.   CARDIAC:  Cardiac auscultation revealed normal S1, S2 with no murmur, rub or gallop.   LUNGS:  Auscultation of the lungs revealed equal air entry on both.     ABDOMEN:  Abdomen was soft with normal bowel sounds.  Ostomy site looks normal.   SKIN:  Skin exam is normal.   NEUROLOGIC:  He had no focal neurological deficit.   EXTREMITIES:  Showed 1+ pitting edema.      CBC RESULTS:   Recent Labs   Lab Test  11/05/18   0938   WBC  5.6   RBC  3.87*   HGB  11.4*   HCT  37.2*   MCV  96   MCH  29.5   MCHC  30.6*   RDW  19.7*   PLT  275     Recent Labs   Lab Test  11/05/18   0938  10/26/18   0739   NA  137  132*   POTASSIUM  3.9  4.2   CHLORIDE  103  99   CO2  26  25   ANIONGAP  8  9   GLC  94  196*   BUN  46*  25   CR  6.20*  3.83*   TRINI  9.7  9.0     Liver Function Studies -   Recent Labs   Lab Test  10/26/18   0739   PROTTOTAL  7.3   ALBUMIN  3.0*   BILITOTAL  0.5   ALKPHOS  155*   AST  15   ALT  19     Prograf - 7.5    CMV - negative.     Immunknow - 713         ASSESSMENT AND PLAN:      Mr. Murray Nicholson is a 63-year-old male status post orthotopic heart transplantation in June with a very complex postoperative course who returns today for followup.     1.  Orthotopic heart transplantation.  Mr. Nicholson has normal graft function.  He has not had any evidence of rejection, fortunately, so far.  His biopsies have been negative.  His graft " systolic function has been normal.  Will discontinue prednisone today if his biopsy from today is negative.  We will increase his CellCept to 750 mg twice a day.  His white blood cell count have remained stable.  He will continue on tacrolimus with a target goal of 6-8 given his multiple recent infections.  He is on aspirin and Crestor for primary prevention of allograft vasculopathy.     2.  Hypertension.  His blood pressure is currently controlled on hydralazine 100 mg 3 times a day, lisinopril 5 mg and Norvasc 10 mg.     3.  End-stage renal disease.  He continues to remain on dialysis 3 times a week.  He has been tolerating it with no problems.  His dry weight has recently been increased.  With that, he is not having any lightheadedness or dizziness.  He is getting vein mapping in anticipation of an AV fistula placement.    4.  Neutropenia.  His white blood cell count has been stable on low-dose of CellCept.  He remains of bactrim, which he no longer requires as he has been weaned off prednisone.  He reminds off of Valcyte with weekly CMV checks.  His CMV PCR's have remained negative.  He is 5 months out of transplant and needs 1 more month of close monitoring.  We will continue to monitor.      5.  High risk for CMV.  He is off of Valcyte secondary to neutropenia.  We have been doing weekly CMV checks.  This has been negative.     6.  Oral ulcer with Klebsiella.  He has been evaluated by ENT.  He had a bedside debridement.  His CT scan shows no evidence of osteomyelitis at this time and point.  He is currently on empiric ciprofloxacin. This looks like this is slowly getting better.  He is also on the chlorhexidine swish and swallow twice a day.  He will continue on ciprofloxacin for additional 3 weeks as per transplant ID recommendations.    7.  Perforated bowel, status post colostomy, doing well, tolerating diet, normal ostomy site output.  Plan for takedown of his colostomy second week of December.    8.   Low-grade fever with elevated CRP.  -He no longer has fever.  His CRP has been downtrending.  He is on ciprofloxacin 500 mg daily.      9.  Diabetes.  He is on NPH insulin.  He also uses NovoLog as needed with a sliding scale.       RTC as per protocol. He will call in the interim if he has any worsening symptoms.       Sincerely,   Klever Ernst MD   [facility]  Heart Failure, Transplant, and Mechanical Circulatory Support Cardiology   Cardiovascular Division  [facility]   [phone]           Klever Ernst MD

## 2018-11-05 NOTE — LETTER
2018      RE: Murray Nicholson  [address]  [place] [address]       Dear Colleague,    Thank you for the opportunity to participate in the care of your patient, Murray Nicholson, at the [facility] HEART [facility]. Please see a copy of my visit note below.    2018     Yahir Turcios MD    [facility]    [address] [place] [address]  [id number]       Ana Luisa Mcpherson MD    [facility]    [address] [place] [address], [facility] [address]    [place]  [address]       RE: Murray Nicholson   MRN: [id number]   : 1955      Dear Dr. Turcios and Dr. Mcpherson:      We had the pleasure of seeing Mr. Murray Nicholson for followup in our Cardiac Transplant Clinic at the [facility].  As you know, he is a very pleasant 63-year-old male who underwent orthotopic heart transplantation on 2018.  He is currently listed for kidney transplantation.  He had a very complex postoperative course.  His current problem list includes:     1.  Status post orthotopic heart transplantation.   2.  Neutropenia.   3.  Oral mucosal ulcer secondary to neutropenia with Klebsiella infection.   4.  Pseudomonas bacteremia, resolved.   5.  Perforation of the small bowel, status post small-bowel resection and ileostomy.   6.  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   7.  Hypertension.   8.  Hyperlipidemia.   9.  End-stage renal disease requiring hemodialysis.      He returns today for follow up.  He is doing okay.  He has not no specific complaints.  He no longer has fever or chills.  Is tolerating the dialysis without any significant side effects.  His white blood cell has remained stable after restarting CellCept.  He was seen by ENT and had an excision of this lesion in his hard palate.  He was also seen by transplant infectious disease and recommended to continue ciprofloxacin for additional 3 weeks.  His CRP is downtrending and low.  Is scheduled for a  retake of his colostomy second week of December.  His weekly CMV PCR has remind negative.       Current Outpatient Prescriptions   Medication Sig     albuterol (PROAIR HFA/PROVENTIL HFA/VENTOLIN HFA) 108 (90 Base) MCG/ACT inhaler Inhale 2 puffs into the lungs every 4 hours as needed for shortness of breath / dyspnea or wheezing     amLODIPine (NORVASC) 10 MG tablet Take 1 tablet (10 mg) by mouth daily     ASPIRIN 81 MG OR TABS Take 1 tablet (81 mg) by mouth at bedtime     atorvastatin (LIPITOR) 40 MG tablet Take 1 tablet (40 mg) by mouth daily     biotin (BIOTIN 5000) 5 MG CAPS Take 5 mg by mouth daily     blood glucose monitoring (ACCU-CHEK FASTCLIX) lancets Use to test blood sugar 2-3 times daily or as directed.  Ok to substitute alternative if insurance prefers.     blood glucose monitoring (NO BRAND SPECIFIED) test strip Use to test blood sugar 2-3 times daily or as directed.     chlorhexidine (PERIDEX) 0.12 % solution Swish and spit 15 mLs in mouth 2 times daily     ciprofloxacin (CIPRO) 500 MG tablet Take 1 tablet (500 mg) by mouth every 24 hours     cloNIDine (CATAPRES) 0.1 MG tablet Take 1 tablet (0.1 mg) by mouth At Bedtime     clotrimazole 10 MG kalpana Place 1 Kalpana (10 mg) inside cheek 4 times daily     fluticasone (FLONASE) 50 MCG/ACT spray Spray 1-2 sprays into both nostrils daily     gentian violet 1 % solution Take 0.5 mLs by mouth 4 times daily     hydrALAZINE (APRESOLINE) 100 MG TABS tablet Take 1 tablet (100 mg) by mouth every 8 hours     insulin aspart (NOVOLOG PEN) 100 UNIT/ML injection Inject 1-7 Units Subcutaneous 4 times daily (before meals and nightly)     insulin isophane human (HUMULIN N PEN) 100 UNIT/ML injection Inject 16 Units Subcutaneous every morning (before breakfast)     insulin isophane human (HUMULIN N PEN) 100 UNIT/ML injection Inject 12 Units Subcutaneous daily (with dinner)     lisinopril (PRINIVIL/ZESTRIL) 5 MG tablet Take 1 tablet (5 mg) by mouth daily     loratadine  (CLARITIN) 10 MG tablet Take 10 mg by mouth daily Reported on 5/3/2017     melatonin 1 MG TABS tablet Take 2 tablets (2 mg) by mouth nightly as needed for sleep     mycophenolate (GENERIC EQUIVALENT) 250 MG capsule Take 2 capsules (500 mg) by mouth 2 times daily     NEPHROCAPS 1 MG capsule Take 1 capsule by mouth daily     order for DME Equipment being ordered: Carol ()  Treatment Diagnosis: ESRD on PD  Pt has to be connected to PD all night and can not be disconnected, hence impending his mobility to go to the bathroom. At risk for infection if he does not have this equipment.     pantoprazole (PROTONIX) 40 MG EC tablet Take 1 tablet (40 mg) by mouth 2 times daily (before meals)     pentamidine (NEBUPENT) 300 MG neb solution Inhale 300 mg into the lungs every 28 days Last given 9/19/18     polyethylene glycol (MIRALAX/GLYCOLAX) Packet Take 17 g by mouth daily as needed for constipation     potassium chloride SA (KLOR-CON) 20 MEQ CR tablet Take 1 tablet (20 mEq) by mouth daily as needed for potassium supplementation (Patient not taking: Reported on 10/31/2018)     predniSONE (DELTASONE) 5 MG tablet Take 1 tablet (5 mg) by mouth daily     rosuvastatin (CRESTOR) 20 MG tablet Take 1 tablet (20 mg) by mouth daily (Patient not taking: Reported on 10/31/2018)     simethicone (MYLICON) 80 MG chewable tablet Take 1 tablet (80 mg) by mouth every 6 hours as needed for cramping     sulfamethoxazole-trimethoprim (BACTRIM/SEPTRA) 400-80 MG per tablet Once per day on Tuesday, Thursday and Saturday. Take after dialysis on dialysis days.     tacrolimus (GENERIC EQUIVALENT) 1 MG capsule Take 1 capsule (1 mg) by mouth 2 times daily     traMADol (ULTRAM) 50 MG tablet Take 1 tablet (50 mg) by mouth every 12 hours as needed for moderate pain     triamcinolone (KENALOG) 0.1 % ointment Apply topically 2 times daily     Urea 40 % CREA Externally apply topically daily     No current facility-administered medications for this visit.   "       REVIEW OF SYSTEMS:  A detailed 10-point review of systems was obtained as described in History of Present Illness.  All other systems are reviewed and are negative.      PHYSICAL EXAMINATION:   GENERAL:  He was comfortable.  He was in no apparent distress.   /77 (BP Location: Left arm, Patient Position: Chair, Cuff Size: Adult Large)  Pulse 101  Ht 1.753 m (5' 9\")  Wt 79.4 kg (175 lb)  SpO2 100%  BMI 25.84 kg/m2  \HEENT:  No pallor, cyanosis, or jaundice.  Examination of the oral cavity revealed an ulcerative lesion on the upper palate covered with granulation tissue, which looks better when compared to last week.  Otherwise, oral cavity examination is normal.   NECK:  Neck exam reveals no jugular venous distention.  Carotids were 2+.  No lymphadenopathy.   LUNGS:  Clear to auscultation bilaterally.   CARDIAC:  Cardiac auscultation revealed normal S1, S2 with no murmur, rub or gallop.   LUNGS:  Auscultation of the lungs revealed equal air entry on both.     ABDOMEN:  Abdomen was soft with normal bowel sounds.  Ostomy site looks normal.   SKIN:  Skin exam is normal.   NEUROLOGIC:  He had no focal neurological deficit.   EXTREMITIES:  Showed 1+ pitting edema.      CBC RESULTS:   Recent Labs   Lab Test  11/05/18   0938   WBC  5.6   RBC  3.87*   HGB  11.4*   HCT  37.2*   MCV  96   MCH  29.5   MCHC  30.6*   RDW  19.7*   PLT  275     Recent Labs   Lab Test  11/05/18   0938  10/26/18   0739   NA  137  132*   POTASSIUM  3.9  4.2   CHLORIDE  103  99   CO2  26  25   ANIONGAP  8  9   GLC  94  196*   BUN  46*  25   CR  6.20*  3.83*   TRINI  9.7  9.0     Liver Function Studies -   Recent Labs   Lab Test  10/26/18   0739   PROTTOTAL  7.3   ALBUMIN  3.0*   BILITOTAL  0.5   ALKPHOS  155*   AST  15   ALT  19     Prograf - 7.5    CMV - negative.     Immunknow - 713         ASSESSMENT AND PLAN:      Mr. Murray Nicholson is a 63-year-old male status post orthotopic heart transplantation in June with a very complex " postoperative course who returns today for followup.     1.  Orthotopic heart transplantation.  Mr. Nicholson has normal graft function.  He has not had any evidence of rejection, fortunately, so far.  His biopsies have been negative.  His graft systolic function has been normal.  Will discontinue prednisone today if his biopsy from today is negative.  We will increase his CellCept to 750 mg twice a day.  His white blood cell count have remained stable.  He will continue on tacrolimus with a target goal of 6-8 given his multiple recent infections.  He is on aspirin and Crestor for primary prevention of allograft vasculopathy.     2.  Hypertension.  His blood pressure is currently controlled on hydralazine 100 mg 3 times a day, lisinopril 5 mg and Norvasc 10 mg.     3.  End-stage renal disease.  He continues to remain on dialysis 3 times a week.  He has been tolerating it with no problems.  His dry weight has recently been increased.  With that, he is not having any lightheadedness or dizziness.  He is getting vein mapping in anticipation of an AV fistula placement.    4.  Neutropenia.  His white blood cell count has been stable on low-dose of CellCept.  He remains of bactrim, which he no longer requires as he has been weaned off prednisone.  He reminds off of Valcyte with weekly CMV checks.  His CMV PCR's have remained negative.  He is 5 months out of transplant and needs 1 more month of close monitoring.  We will continue to monitor.      5.  High risk for CMV.  He is off of Valcyte secondary to neutropenia.  We have been doing weekly CMV checks.  This has been negative.     6.  Oral ulcer with Klebsiella.  He has been evaluated by ENT.  He had a bedside debridement.  His CT scan shows no evidence of osteomyelitis at this time and point.  He is currently on empiric ciprofloxacin. This looks like this is slowly getting better.  He is also on the chlorhexidine swish and swallow twice a day.  He will continue on  ciprofloxacin for additional 3 weeks as per transplant ID recommendations.    7.  Perforated bowel, status post colostomy, doing well, tolerating diet, normal ostomy site output.  Plan for takedown of his colostomy second week of December.    8.  Low-grade fever with elevated CRP.  -He no longer has fever.  His CRP has been downtrending.  He is on ciprofloxacin 500 mg daily.      9.  Diabetes.  He is on NPH insulin.  He also uses NovoLog as needed with a sliding scale.       RTC as per protocol. He will call in the interim if he has any worsening symptoms.       Sincerely,   Klever Ernst MD   [facility]  Heart Failure, Transplant, and Mechanical Circulatory Support Cardiology   Cardiovascular Division  [facility]   [phone]

## 2018-11-05 NOTE — PROGRESS NOTES
SUBJECTIVE/OBJECTIVE:                Murray Nicholson is a 63 year old male coming in for a follow-up visit for Medication Therapy Management.  He was referred to me from txp team for blood sugar monitoring.      Chief Complaint: Pt has been checking BG TID as directed.     Tobacco: No tobacco use  Alcohol: not currently using    Medication Adherence/Access:  Patient uses pill box(es).  Patient takes medications 4 time(s) per day. 7-7:30am, 12pm, 4-6pm, 7:30pm.   Per patient, misses medication 0 times per week. Has been taking Immunos at 7:30am and pm, since his labs are at 7:30am.   The patient fills medications at [place]: YES.    Heart Transplant:  Current immunosuppressants include TAC 1mg BID, prednisone 5mg daily, MMF 500mg BID. Next week biopsy. Tremor has improved greatly, only happens occasionally.   Transplant date: 6/14/18  Estimated Creatinine Clearance: 11.8 mL/min (based on Cr of 7.09).  CMV prophylaxis: CMV mismatch, Valcyte was stopped due to neutropenia. WBC currently WNL  PCP prophylaxis: Bactrim holding due to neutropenia. Receiving Pentamidine inhalations monthly.   Antifungal Prophylaxis: Nystatin until off steroids. QID.   PPI use: Pantoprazole 40mg BID. Denies GERD sx, Duodenitis  Current supplements for electrolyte replacement:  Nephrocaps  Tx Coordinator: Bob Ulloa MD: Klever, Using Med Card: Yes  Recent Infections:  Concerns about a post OP infection. Pt is getting Vancomycin IV after Dialysis sessions.   Recent Hospitalizations: recent txp  Home BPs: See htn     Diabete:  Pt currently taking NPH  24 units units qAM, 5 units qevening.  Has Novolog Sliding scale as well.   Novolog Sliding scale   140-189: 1 unit  190-239: 2 units  240-289: 3 units  290-339: 4 units  340-399: 5 units  400-449: 6 units  Over 450: 7 units and contact me  SMBG: TID.   Ranges (patient reported):   Date FBG/ 2hours post Lunch/2hours post Dinner /2hours post   11/5 85 138    11/4 82 /111 269 (3U)   11/3 107 168  (1U) 198   11/2 84 219 (2 U) 191 (2U)   11/1 87 101 252 (3U)   10/31 87 188 (1U) 207 (2U)   10/30 107 132 219 (2U)   10/29 94 148 (1U) 196 (2U)   10/28 88 115 258 (2U)   10/27 106 144 (1U) 191 (2U)       Date FBG/ 2hours post Dinner (taking right after dinner)   10/26 139    10/25 111 192/176   10/24 116 /242   10/23 122 /252   10/22 86 /232   10/21 120 /319   10/20 122 /177   10/19 67 (no sx) /283   10/18 84 /217   10/17 84 /192   Patient is experiencing mild hypoglycemia in the mornings, but no sx.   Recent symptoms of high blood sugar? none  Diet/Exercise: Eating around meals a day. Spinach, potato, rotisserie chicken, Carrots dinner. Low sodium diet. Has been eating popcorn in the afternoon.   Lab Results   Component Value Date    A1C 7.0 07/18/2018    A1C 7.0 04/26/2018    A1C 6.3 04/04/2018    A1C 8.6 02/02/2018    A1C 9.1 01/08/2018      Today's Vitals: There were no vitals taken for this visit.      ASSESSMENT:              Current medications were reviewed today as discussed above.      Medication Adherence: fair, improved since last visit. Encouraged continued compliance at 8 am and 8pm for immunos.     Heart Transplant:  Stable.     Diabetes: Improved. Pt is testing TID. Fasting and lunch time are at goal , AM is running in the 80s as well, but nothing <80. I believe patient's snacking habits in the afternoon are resulting in his higher blood sugars evening. We discussed some kidney/ diabetes healthy snacks. May consider reducing evening NPH if BG continue be running in the 80s at next visit.   PLAN:                  Pt to...  1. Avoid high carb snacks in the afternoon.   2. A1c at next lab appointment.     I spent 15 minutes with this patient today. I offer these suggestions for consideration by txp team. A1c entered per CPA with PCP.  A copy of the visit note was provided to the patient's txp team and PCP.     Will follow up in 4 weeks.    The patient was given a summary of these recommendations  as an after visit summary.    Eduardo Lea, PharmD  [facility] Pharmacist    Phone: [phone]

## 2018-11-05 NOTE — PROCEDURES
PRELIMINARY CARDIAC CATH REPORT:     PROCEDURES PERFORMED:   Endomyocardial Biopsy    PHYSICIANS:  Attending Physician: Marcelo Ramírez MD  Interventional Cardiology Fellow: Miah Mack MD  Cardiology Fellow: Jordan Urrutia MD    INDICATION:  Murray Nicholson is a 63 year old male s/p OHT referred for elective endomyocardial biopsy.  Transplant was 6/14/18.    DESCRIPTION:  1. Consent obtained with discussion of risks.  All questions were answered.  2. Sterile prep and procedure.  3. Location with Sheaths:   7 Fr Lf IJ  4. Access: Local anesthetic with lidocaine.  A standard 18 guage needle with ultrasound guidance was used to establish vascular access using a modified Seldinger technique.  5.  Estimated blood loss: < 5 ml    MEDICATIONS:    Heart rate, BP, respiration, oxygen saturation and patient responses were monitored throughout the procedure with the assistance of the RN under my supervision.    Procedures:    ENDOMYOCARDIAL BIOPSY:  1. Successful collection of 5 right ventricular endomyocardial biopsy samples using the Jawz.      Sheath Removal:  The LIJ sheath was manually removed in the cardiac catheterization laboratory.    Contrast: Isovue, 0 ml     Fluoroscopy Time: 7.9 min    COMPLICATIONS:  1. None    SUMMARY:   Successful endomyocardial biopsy with 5 samples sent for pathological review.    PLAN:   >> Discharge today per protocol  >> Transplant team to follow up results of biopsies    The attending interventional cardiologist was present and supervised all critical aspects the procedure.    Findings discussed with patient at end of case.    See CVIS report for final draft.    Jordan Urrutia MD & Miah Mack MD  Cardiology Fellow    Nadir Ramírez MD  Cardiology Staff      I have seen and examined the patient with the CSI team. I agree with the assessment and plan of the note above.I have reviewed pertinent labs.     Marcelo Ramírez MD  Interventional Cardiology  Pager: [phone]

## 2018-11-05 NOTE — NURSING NOTE
Chief Complaint   Patient presents with     Follow Up For     New heart TX     Medications reviewed and vitals performed.  Pushpa Kinney CMA

## 2018-11-05 NOTE — MR AVS SNAPSHOT
After Visit Summary   11/5/2018    Murray Nicholson    MRN: [id number]           Patient Information     Date Of Birth          1955        Visit Information        Provider Department      11/5/2018 2:30 PM Eduardo Lea [facility] [place] Medication Therapy Management        Care Instructions    Recommendations from today's MTM visit:                                                      1. Replace your midday popcorn with a snack high in fiber and protein. I included some guidance in light of your kidney diseas attached to the back on this document. Remain aware of how your snack choice affects our evening blood sugars.    Next MTM visit: 12/3 at 3:00pm    To schedule another MTM appointment, please call the clinic directly or you may call the MTM scheduling line at [phone] or toll-free at [phone].     My Clinical Pharmacist's contact information:                                                      It was a pleasure talking with you today!  Please feel free to contact me with any questions or concerns you have.      Eduardo Lea, PharmD  [facility] Pharmacist    Phone: [phone]     You may receive a survey about the MTM services you received.  I would appreciate your feedback to help me serve you better in the future. Please fill it out and return it when you can. Your comments will be anonymous.                  Follow-ups after your visit        Your next 10 appointments already scheduled     Nov 05, 2018  4:30 PM CST   (Arrive by 4:15 PM)   RETURN HEART TRANSPLANT with Klever Ernst MD   [facility] ([facility] [place])    [address] [place] [address]  [address] [place] [address] [phone]            Nov 13, 2018  8:00 AM CST   (Arrive by 7:45 AM)   Return Visit with Tristan Bañuelos MD   [facility] Ear Nose and Throat ([facility])    [address]  4th Floor  [facility] [address] [phone]             Nov 16, 2018  9:40 AM CST   CT CHEST ABDOMEN PELVIS W/O & W CONTRAST with UCCT2   [facility] CT ([facility] [place])    [address]  1st Floor  [facility] [address] [phone]           How do I prepare for my exam? (Food and drink instructions) To prepare: Do not eat or drink for 2 hours before your exam. If you need to take medicine, you may take it with small sips of water. (We may ask you to take liquid medicine as well.)  How do I prepare for my exam? (Other instructions) Please arrive 30 minutes early for your CT.  Once in the department you might be asked to drink water 15-20 minutes prior to your exam.  If indicated you may be asked to drink an oral contrast in advance of your CT.  If this is the case, the imaging team will let you know or be in contact with you prior to your appointment  Patients over 70 or patients with diabetes or kidney problems: If you haven t had a blood test (creatinine test) within the last 30 days, the Cardiologist/Radiologist may require you to get this test prior to your exam.  If you have diabetes:  Continue to take your metformin medication on the day of your exam  What should I wear: Please wear loose clothing, such as a sweat suit or jogging clothes. Avoid snaps, zippers and other metal. We may ask you to undress and put on a hospital gown.  How long does the exam take: Most scans take less than 20 minutes.  What should I bring: Please bring any scans or X-rays taken at other hospitals, if similar tests were done. Also bring a list of your medicines, including vitamins, minerals and over-the-counter drugs. It is safest to leave personal items at home.  Do I need a : No  is needed.  What do I need to tell my doctor? Be sure to tell your doctor: * If you have any allergies. * If there s any chance you are pregnant. * If you are breastfeeding.  What should I do after the exam: No restrictions, You may resume normal  activities.  What is this test: A CT (computed tomography) scan is a series of pictures that allows us to look inside your body. The scanner creates images of the body in cross sections, much like slices of bread. This helps us see any problems more clearly. You may receive contrast (X-ray dye) before or during your scan. You will be asked to drink the contrast.  Who should I call with questions: If you have any questions, please call the Imaging Department where you will have your exam. Directions, parking instructions, and other information is available on our website, [email or web address].            Nov 16, 2018 11:30 AM CST   (Arrive by 11:15 AM)   PAC EVALUATION with MAYTE Lopez   [facility] ([facility] [place])    [address] [place] [address]  4th Floor  [facility] [address]   [phone]            Nov 21, 2018   Procedure with Rick Tran MD   [facility], Same Day Surgery (--)    [address] [facility] [address]   [phone]            Nov 26, 2018  1:30 PM CST   US VAS ARTERIOVENOUS MAP BILATERAL DIALYSIS ACCESS with UCUSV2   [facility] US ([facility])    [address]  1st Floor  [facility] [address] [phone]           How do I prepare for my exam? (Food and drink instructions) No Food and Drink Restrictions.  How do I prepare for my exam? (Other instructions) You do not need to do anything special to prepare for your exam.  What should I wear: Wear comfortable clothes.  How long does the exam take: Most ultrasounds take 30 to 60 minutes.  What should I bring: Bring a list of your medicines, including vitamins, minerals and over-the-counter drugs. It is safest to leave personal items at home.  Do I need a :  No  is needed.  What do I need to tell my doctor: Tell your doctor about any allergies you may have.  What should I do after the exam: No  restrictions, You may resume normal activities.  What is this test: An ultrasound uses sound waves to make pictures of the body. Sound waves do not cause pain. The only discomfort may be the pressure of the wand against your skin or full bladder.  Who should I call with questions: If you have any questions, please call the Imaging Department where you will have your exam. Directions, parking instructions, and other information is available on our website, [email or web address].            Nov 26, 2018  2:45 PM CST   (Arrive by 2:30 PM)   Fistula Consult with Calin Cheney MD   [facility] ([facility] [place])    [address]  [address]  [facility] [address]   [phone]            Dec 03, 2018  8:00 AM CST   LAB with UU LAB GOLD WAITING   [facility], Lab ([facility])    [address] [address]              Please do not eat 10-12 hours before your appointment if you are coming in fasting for labs on lipids, cholesterol, or glucose (sugar). This does not apply to pregnant women. Water, hot tea and black coffee (with nothing added) are okay. Do not drink other fluids, diet soda or chew gum.            Dec 03, 2018  8:30 AM CST   Ech Complete with UCARLENE   [facility],  Echocardiography ([facility])    [address] [facility] [id number] [phone]           1.  Please bring or wear a comfortable two-piece outfit. 2.  You may eat, drink and take your normal medicines. 3.  For any questions that cannot be answered, please contact the ordering physician 4.  Please do not wear perfumes or scented lotions on the day of your exam.            Dec 03, 2018  9:30 AM CST   Procedure - 2.5 hour with U2A ROOM 12   [address] [facility] ([facility])    [address] [address]                 Who to  contact     If you have questions or need follow up information about today's clinic visit or your schedule please contact  [facility] directly at [phone].  Normal or non-critical lab and imaging results will be communicated to you by MyChart, letter or phone within 4 business days after the clinic has received the results. If you do not hear from us within 7 days, please contact the clinic through [employer] or phone. If you have a critical or abnormal lab result, we will notify you by phone as soon as possible.  Submit refill requests through [employer] or call your pharmacy and they will forward the refill request to us. Please allow 3 business days for your refill to be completed.          Additional Information About Your Visit        [employer] Information     [employer] gives you secure access to your electronic health record. If you see a primary care provider, you can also send messages to your care team and make appointments. If you have questions, please call your primary care clinic.  If you do not have a primary care provider, please call [phone] and they will assist you.        Care EveryWhere ID     This is your Care EveryWhere ID. This could be used by other organizations to access your [facility] medical records  [id number]         Blood Pressure from Last 3 Encounters:   11/05/18 (!) 168/94   10/31/18 125/71   10/15/18 127/80    Weight from Last 3 Encounters:   11/05/18 169 lb 12.1 oz (77 kg)   10/31/18 171 lb 9.6 oz (77.8 kg)   10/19/18 176 lb (79.8 kg)              Today, you had the following     No orders found for display       Primary Care Provider Office Phone # Fax #    Yahir Turcios [phone]       [address]  [facility] [address]        Goals        Lifestyle    Increase physical activity     Notes - Note created  7/3/2018  1:51 PM by Carrie Tran, RN    Goal Statement: I will start cardiac rehab  Measure of Success: starts cardiac  rehab  Supportive Steps to Achieve: support of RN CC  Barriers: none  Strengths: willingness to participate   Date to Achieve By: 7-15-18          Equal Access to Services     JOHN HAUSER : Hadii aad ku hadroddysona Thomas, jose villa, haroonmicki rodrigueznabilamerry [place], jose heshamin hayaaalexa richmonddorothy way laRachaelgary ayala. So [facility] [phone].    ATENCIÓN: Si habla español, tiene a ortiz disposición servicios gratuitos de asistencia lingüística. Llame al [phone].    We comply with applicable federal civil rights laws and [place] laws. We do not discriminate on the basis of race, color, national origin, age, disability, sex, sexual orientation, or gender identity.            Thank you!     Thank you for choosing [facility] MEDICATION THERAPY MANAGEMENT  for your care. Our goal is always to provide you with excellent care. Hearing back from our patients is one way we can continue to improve our services. Please take a few minutes to complete the written survey that you may receive in the mail after your visit with us. Thank you!             Your Updated Medication List - Protect others around you: Learn how to safely use, store and throw away your medicines at www.disposemymeds.org.          This list is accurate as of 11/5/18  2:48 PM.  Always use your most recent med list.                   Brand Name Dispense Instructions for use Diagnosis    albuterol 108 (90 Base) MCG/ACT inhaler    PROAIR HFA/PROVENTIL HFA/VENTOLIN HFA     Inhale 2 puffs into the lungs every 4 hours as needed for shortness of breath / dyspnea or wheezing        amLODIPine 10 MG tablet    NORVASC    90 tablet    Take 1 tablet (10 mg) by mouth daily    Hypertension goal BP (blood pressure) < 130/80       aspirin 81 MG tablet      Take 1 tablet (81 mg) by mouth at bedtime        atorvastatin 40 MG tablet    LIPITOR    90 tablet    Take 1 tablet (40 mg) by mouth daily    Heart replaced by transplant (H)       biotin 5 MG Caps    BIOTIN 5000    30 capsule    Take  5 mg by mouth daily    Brittle nails       blood glucose monitoring lancets     102 each    Use to test blood sugar 2-3 times daily or as directed.  Ok to substitute alternative if insurance prefers.    Type 2 diabetes mellitus with stage 5 chronic kidney disease not on chronic dialysis, without long-term current use of insulin (H)       blood glucose monitoring test strip    no brand specified    100 each    Use to test blood sugar 2-3 times daily or as directed.    Type 2 diabetes mellitus with stage 5 chronic kidney disease not on chronic dialysis, without long-term current use of insulin (H)       chlorhexidine 0.12 % solution    PERIDEX    900 mL    Swish and spit 15 mLs in mouth 2 times daily    Oral ulceration       ciprofloxacin 500 MG tablet    CIPRO    14 tablet    Take 1 tablet (500 mg) by mouth every 24 hours    Oral ulceration       CLONIDINE HCL PO      Take 0.1 mg by mouth At Bedtime        clotrimazole 10 MG kalpana     120 Kalpana    Place 1 Kalpana (10 mg) inside cheek 4 times daily    Candidiasis of mouth       fluticasone 50 MCG/ACT spray    FLONASE    16 g    Spray 1-2 sprays into both nostrils daily    Nasal congestion       gentian violet 1 % solution     30 mL    Take 0.5 mLs by mouth 4 times daily    Oral ulceration       hydrALAZINE 100 MG Tabs tablet    APRESOLINE    90 tablet    Take 1 tablet (100 mg) by mouth every 8 hours    Essential hypertension       insulin aspart 100 UNIT/ML injection    NovoLOG PEN    9 mL    Inject 1-7 Units Subcutaneous 4 times daily (before meals and nightly)    Type 2 diabetes mellitus with diabetic nephropathy, with long-term current use of insulin (H)       * insulin isophane human 100 UNIT/ML injection    HumuLIN N PEN    6 mL    Inject 16 Units Subcutaneous every morning (before breakfast)    Type 2 diabetes mellitus with diabetic nephropathy, with long-term current use of insulin (H)       * insulin isophane human 100 UNIT/ML injection    HumuLIN N PEN    3  mL    Inject 12 Units Subcutaneous daily (with dinner)    Type 2 diabetes mellitus with diabetic nephropathy, with long-term current use of insulin (H)       lisinopril 5 MG tablet    PRINIVIL/ZESTRIL    90 tablet    Take 1 tablet (5 mg) by mouth daily    Hypertension goal BP (blood pressure) < 130/80       loratadine 10 MG tablet    CLARITIN     Take 10 mg by mouth daily Reported on 5/3/2017    Hypertensive cardiopathy, SOB (shortness of breath)       melatonin 1 MG Tabs tablet     120 tablet    Take 2 tablets (2 mg) by mouth nightly as needed for sleep    Heart transplanted (H)       mycophenolate 250 MG capsule    GENERIC EQUIVALENT    120 capsule    Take 2 capsules (500 mg) by mouth 2 times daily    Heart transplanted (H)       NEPHROCAPS 1 MG capsule     120 capsule    Take 1 capsule by mouth daily        order for DME     1 Units    Equipment being ordered: Carol () Treatment Diagnosis: ESRD on PD Pt has to be connected to PD all night and can not be disconnected, hence impending his mobility to go to the bathroom. At risk for infection if he does not have this equipment.    CKD (chronic kidney disease) stage 5, GFR less than 15 ml/min (H)       pantoprazole 40 MG EC tablet    PROTONIX    60 tablet    Take 1 tablet (40 mg) by mouth 2 times daily (before meals)    Duodenitis       pentamidine 300 MG neb solution    NEBUPENT    300 mg    Inhale 300 mg into the lungs every 28 days Last given 9/19/18    Heart replaced by transplant (H)       polyethylene glycol Packet    MIRALAX/GLYCOLAX    7 packet    Take 17 g by mouth daily as needed for constipation    Constipation, unspecified constipation type       potassium chloride SA 20 MEQ CR tablet    KLOR-CON    3 tablet    Take 1 tablet (20 mEq) by mouth daily as needed for potassium supplementation    Heart replaced by transplant (H)       predniSONE 5 MG tablet    DELTASONE    60 tablet    Take 1 tablet (5 mg) by mouth daily    Heart transplanted (H)        rosuvastatin 20 MG tablet    CRESTOR    30 tablet    Take 1 tablet (20 mg) by mouth daily    Heart transplant recipient (H)       simethicone 80 MG chewable tablet    MYLICON    180 tablet    Take 1 tablet (80 mg) by mouth every 6 hours as needed for cramping    Flatulence, eructation and gas pain       sulfamethoxazole-trimethoprim 400-80 MG per tablet    BACTRIM/SEPTRA    14 tablet    Once per day on Tuesday, Thursday and Saturday. Take after dialysis on dialysis days.    Heart transplanted (H)       tacrolimus 1 MG capsule    GENERIC EQUIVALENT    60 capsule    Take 1 capsule (1 mg) by mouth 2 times daily    Heart transplanted (H)       traMADol 50 MG tablet    ULTRAM    10 tablet    Take 1 tablet (50 mg) by mouth every 12 hours as needed for moderate pain    Moderate pain       triamcinolone 0.1 % ointment    KENALOG    80 g    Apply topically 2 times daily    Xerosis of skin       Urea 40 % Crea     85 g    Externally apply topically daily    Brittle nails       * Notice:  This list has 2 medication(s) that are the same as other medications prescribed for you. Read the directions carefully, and ask your doctor or other care provider to review them with you.

## 2018-11-05 NOTE — PROGRESS NOTES
Patient returned from [facility] to 2A post cardiac biopsy at 1140.  Patient tolerated recovery stage well. VSS, LIJV site clean/dry/intact, no hematoma, and denies pain. Patient tolerated PO  fluids. Teaching was done and discharge instructions were given. Patient ambulated.  Patient discharged from the hospital via  ambulatory  to home per self.

## 2018-11-05 NOTE — PROGRESS NOTES
Prepped for endomyocardial biopsy.  Denies pain.  Self transport planned.  H & P current.  Consent signed.  Needs lab drawn & processed.

## 2018-11-05 NOTE — IP AVS SNAPSHOT
MRN:[id number]                      After Visit Summary   11/5/2018    Murray Nicholson    MRN: [id number]           Visit Information        Department      11/5/2018  8:14 AM [address] [facility]          Review of your medicines      UNREVIEWED medicines. Ask your doctor about these medicines        Dose / Directions    albuterol 108 (90 Base) MCG/ACT inhaler   Commonly known as:  PROAIR HFA/PROVENTIL HFA/VENTOLIN HFA        Dose:  2 puff   Inhale 2 puffs into the lungs every 4 hours as needed for shortness of breath / dyspnea or wheezing   Refills:  0       amLODIPine 10 MG tablet   Commonly known as:  NORVASC   Used for:  Hypertension goal BP (blood pressure) < 130/80        Dose:  10 mg   Take 1 tablet (10 mg) by mouth daily   Quantity:  90 tablet   Refills:  3       aspirin 81 MG tablet        Take 1 tablet (81 mg) by mouth at bedtime   Refills:  0       atorvastatin 40 MG tablet   Commonly known as:  LIPITOR   Used for:  Heart replaced by transplant (H)        Dose:  40 mg   Take 1 tablet (40 mg) by mouth daily   Quantity:  90 tablet   Refills:  3       biotin 5 MG Caps   Commonly known as:  BIOTIN 5000   Used for:  Brittle nails        Dose:  5 mg   Take 5 mg by mouth daily   Quantity:  30 capsule   Refills:  3       chlorhexidine 0.12 % solution   Commonly known as:  PERIDEX   Used for:  Oral ulceration        Dose:  15 mL   Swish and spit 15 mLs in mouth 2 times daily   Quantity:  900 mL   Refills:  0       ciprofloxacin 500 MG tablet   Commonly known as:  CIPRO   Indication:  Infection of the Skin and/or Related Soft Tissue, oral ulceration   Used for:  Oral ulceration        Dose:  500 mg   Take 1 tablet (500 mg) by mouth every 24 hours   Quantity:  14 tablet   Refills:  0       CLONIDINE HCL PO        Dose:  0.1 mg   Take 0.1 mg by mouth At Bedtime   Refills:  0       clotrimazole 10 MG kalpana   Used for:  Candidiasis of mouth        Dose:  1 Kalpana   Place 1 Kalpana (10 mg) inside cheek  4 times daily   Quantity:  120 Kalpana   Refills:  11       fluticasone 50 MCG/ACT spray   Commonly known as:  FLONASE   Used for:  Nasal congestion        Dose:  1-2 spray   Spray 1-2 sprays into both nostrils daily   Quantity:  16 g   Refills:  11       gentian violet 1 % solution   Used for:  Oral ulceration        Dose:  0.5 mL   Take 0.5 mLs by mouth 4 times daily   Quantity:  30 mL   Refills:  1       hydrALAZINE 100 MG Tabs tablet   Commonly known as:  APRESOLINE   Used for:  Essential hypertension        Dose:  100 mg   Take 1 tablet (100 mg) by mouth every 8 hours   Quantity:  90 tablet   Refills:  11       insulin aspart 100 UNIT/ML injection   Commonly known as:  NovoLOG PEN   Used for:  Type 2 diabetes mellitus with diabetic nephropathy, with long-term current use of insulin (H)        Dose:  1-7 Units   Inject 1-7 Units Subcutaneous 4 times daily (before meals and nightly)   Quantity:  9 mL   Refills:  3       * insulin isophane human 100 UNIT/ML injection   Commonly known as:  HumuLIN N PEN   Indication:  Type 2 Diabetes   Used for:  Type 2 diabetes mellitus with diabetic nephropathy, with long-term current use of insulin (H)        Dose:  16 Units   Inject 16 Units Subcutaneous every morning (before breakfast)   Quantity:  6 mL   Refills:  11       * insulin isophane human 100 UNIT/ML injection   Commonly known as:  HumuLIN N PEN   Indication:  Type 2 Diabetes   Used for:  Type 2 diabetes mellitus with diabetic nephropathy, with long-term current use of insulin (H)        Dose:  12 Units   Inject 12 Units Subcutaneous daily (with dinner)   Quantity:  3 mL   Refills:  11       lisinopril 5 MG tablet   Commonly known as:  PRINIVIL/ZESTRIL   Indication:  High Blood Pressure Disorder   Used for:  Hypertension goal BP (blood pressure) < 130/80        Dose:  5 mg   Take 1 tablet (5 mg) by mouth daily   Quantity:  90 tablet   Refills:  3       loratadine 10 MG tablet   Commonly known as:  CLARITIN   Used for:   Hypertensive cardiopathy, SOB (shortness of breath)        Dose:  10 mg   Take 10 mg by mouth daily Reported on 5/3/2017   Refills:  0       melatonin 1 MG Tabs tablet   Used for:  Heart transplanted (H)        Dose:  2 mg   Take 2 tablets (2 mg) by mouth nightly as needed for sleep   Quantity:  120 tablet   Refills:  1       mycophenolate 250 MG capsule   Commonly known as:  GENERIC EQUIVALENT   Used for:  Heart transplanted (H)        Dose:  500 mg   Take 2 capsules (500 mg) by mouth 2 times daily   Quantity:  120 capsule   Refills:  11       NEPHROCAPS 1 MG capsule        Dose:  1 capsule   Take 1 capsule by mouth daily   Quantity:  120 capsule   Refills:  0       pantoprazole 40 MG EC tablet   Commonly known as:  PROTONIX   Indication:  GI prophylaxis   Used for:  Duodenitis        Dose:  40 mg   Take 1 tablet (40 mg) by mouth 2 times daily (before meals)   Quantity:  60 tablet   Refills:  0       pentamidine 300 MG neb solution   Commonly known as:  NEBUPENT   Indication:  PJP prophylaxis post OHT   Used for:  Heart replaced by transplant (H)        Dose:  300 mg   Inhale 300 mg into the lungs every 28 days Last given 9/19/18   Quantity:  300 mg   Refills:  0       polyethylene glycol Packet   Commonly known as:  MIRALAX/GLYCOLAX   Used for:  Constipation, unspecified constipation type        Dose:  17 g   Take 17 g by mouth daily as needed for constipation   Quantity:  7 packet   Refills:  0       potassium chloride SA 20 MEQ CR tablet   Commonly known as:  KLOR-CON   Used for:  Heart replaced by transplant (H)        Dose:  20 mEq   Take 1 tablet (20 mEq) by mouth daily as needed for potassium supplementation   Quantity:  3 tablet   Refills:  0       predniSONE 5 MG tablet   Commonly known as:  DELTASONE   Indication:  Immunosupressant/S/p heart transplant   Used for:  Heart transplanted (H)        Dose:  5 mg   Take 1 tablet (5 mg) by mouth daily   Quantity:  60 tablet   Refills:  0       rosuvastatin 20 MG  tablet   Commonly known as:  CRESTOR   Used for:  Heart transplant recipient (H)        Dose:  20 mg   Take 1 tablet (20 mg) by mouth daily   Quantity:  30 tablet   Refills:  1       simethicone 80 MG chewable tablet   Commonly known as:  MYLICON   Used for:  Flatulence, eructation and gas pain        Dose:  80 mg   Take 1 tablet (80 mg) by mouth every 6 hours as needed for cramping   Quantity:  180 tablet   Refills:  0       sulfamethoxazole-trimethoprim 400-80 MG per tablet   Commonly known as:  BACTRIM/SEPTRA   Used for:  Heart transplanted (H)        Once per day on Tuesday, Thursday and Saturday. Take after dialysis on dialysis days.   Quantity:  14 tablet   Refills:  3       tacrolimus 1 MG capsule   Commonly known as:  GENERIC EQUIVALENT   Used for:  Heart transplanted (H)        Dose:  1 mg   Take 1 capsule (1 mg) by mouth 2 times daily   Quantity:  60 capsule   Refills:  0       traMADol 50 MG tablet   Commonly known as:  ULTRAM   Used for:  Moderate pain        Dose:  50 mg   Take 1 tablet (50 mg) by mouth every 12 hours as needed for moderate pain   Quantity:  10 tablet   Refills:  0       triamcinolone 0.1 % ointment   Commonly known as:  KENALOG   Used for:  Xerosis of skin        Apply topically 2 times daily   Quantity:  80 g   Refills:  0       Urea 40 % Crea   Used for:  Brittle nails        Externally apply topically daily   Quantity:  85 g   Refills:  1       * Notice:  This list has 2 medication(s) that are the same as other medications prescribed for you. Read the directions carefully, and ask your doctor or other care provider to review them with you.      CONTINUE these medicines which have NOT CHANGED        Dose / Directions    blood glucose monitoring lancets   Used for:  Type 2 diabetes mellitus with stage 5 chronic kidney disease not on chronic dialysis, without long-term current use of insulin (H)        Use to test blood sugar 2-3 times daily or as directed.  Ok to substitute  alternative if insurance prefers.   Quantity:  102 each   Refills:  11       blood glucose monitoring test strip   Commonly known as:  no brand specified   Used for:  Type 2 diabetes mellitus with stage 5 chronic kidney disease not on chronic dialysis, without long-term current use of insulin (H)        Use to test blood sugar 2-3 times daily or as directed.   Quantity:  100 each   Refills:  11       order for DME   Used for:  CKD (chronic kidney disease) stage 5, GFR less than 15 ml/min (H)        Equipment being ordered: Commode () Treatment Diagnosis: ESRD on PD Pt has to be connected to PD all night and can not be disconnected, hence impending his mobility to go to the bathroom. At risk for infection if he does not have this equipment.   Quantity:  1 Units   Refills:  0                Protect others around you: Learn how to safely use, store and throw away your medicines at www.disposemymeds.org.         Follow-ups after your visit        Your next 10 appointments already scheduled     Nov 05, 2018  9:30 AM CST   Heart Cath Heart Biopsy with [id number]   [facility] ([facility])    [address] [address]   [phone]            Nov 05, 2018  2:30 PM CST   (Arrive by 2:15 PM)   [facility] ([facility])    [address] [place] [address]  3rd [facility] [address]   [phone]            Nov 05, 2018  4:30 PM CST   (Arrive by 4:15 PM)   RETURN HEART TRANSPLANT with Klever Ernst MD   [facility] ([facility])    [address] [place] [address]  [address] [place] [address]   [phone]            Nov 13, 2018  8:00 AM CST   (Arrive by 7:45 AM)   Return Visit with Tristan Bañuelos MD   [facility] Ear Nose and Throat ([facility])    [address] [place] [address]  4th [facility]  [place] [address]   [phone]            Nov 16, 2018  9:40 AM CST   CT CHEST ABDOMEN PELVIS W/O & W CONTRAST with UCCT2   [facility] ([facility])    [address]  1st Floor  [facility] [address]   [phone]           How do I prepare for my exam? (Food and drink instructions) To prepare: Do not eat or drink for 2 hours before your exam. If you need to take medicine, you may take it with small sips of water. (We may ask you to take liquid medicine as well.)  How do I prepare for my exam? (Other instructions) Please arrive 30 minutes early for your CT.  Once in the department you might be asked to drink water 15-20 minutes prior to your exam.  If indicated you may be asked to drink an oral contrast in advance of your CT.  If this is the case, the imaging team will let you know or be in contact with you prior to your appointment  Patients over 70 or patients with diabetes or kidney problems: If you haven t had a blood test (creatinine test) within the last 30 days, the Cardiologist/Radiologist may require you to get this test prior to your exam.  If you have diabetes:  Continue to take your metformin medication on the day of your exam  What should I wear: Please wear loose clothing, such as a sweat suit or jogging clothes. Avoid snaps, zippers and other metal. We may ask you to undress and put on a hospital gown.  How long does the exam take: Most scans take less than 20 minutes.  What should I bring: Please bring any scans or X-rays taken at other hospitals, if similar tests were done. Also bring a list of your medicines, including vitamins, minerals and over-the-counter drugs. It is safest to leave personal items at home.  Do I need a : No  is needed.  What do I need to tell my doctor? Be sure to tell your doctor: * If you have any allergies. * If there s any chance you are pregnant. * If you are breastfeeding.  What should I do after the exam: No  restrictions, You may resume normal activities.  What is this test: A CT (computed tomography) scan is a series of pictures that allows us to look inside your body. The scanner creates images of the body in cross sections, much like slices of bread. This helps us see any problems more clearly. You may receive contrast (X-ray dye) before or during your scan. You will be asked to drink the contrast.  Who should I call with questions: If you have any questions, please call the Imaging Department where you will have your exam. Directions, parking instructions, and other information is available on our website, [email or web address].            Nov 16, 2018 11:30 AM CST   (Arrive by 11:15 AM)   PAC EVALUATION with MAYTE Lopez   [facility] ([facility])    [address]  4th Floor  [facility] [address]   [phone]            Nov 21, 2018   Procedure with Rick Tran MD   [facility] [place], Same Day Surgery (--)    [address] [address]   [phone]            Nov 26, 2018  1:30 PM CST   US VAS ARTERIOVENOUS MAP BILATERAL DIALYSIS ACCESS with UCUSV2   [facility] US ([facility])    [address]  1st Floor  [facility] [address] [phone]           How do I prepare for my exam? (Food and drink instructions) No Food and Drink Restrictions.  How do I prepare for my exam? (Other instructions) You do not need to do anything special to prepare for your exam.  What should I wear: Wear comfortable clothes.  How long does the exam take: Most ultrasounds take 30 to 60 minutes.  What should I bring: Bring a list of your medicines, including vitamins, minerals and over-the-counter drugs. It is safest to leave personal items at home.  Do I need a :  No  is needed.  What do I need to tell my doctor: Tell your doctor about any allergies you may have.  What should  I do after the exam: No restrictions, You may resume normal activities.  What is this test: An ultrasound uses sound waves to make pictures of the body. Sound waves do not cause pain. The only discomfort may be the pressure of the wand against your skin or full bladder.  Who should I call with questions: If you have any questions, please call the Imaging Department where you will have your exam. Directions, parking instructions, and other information is available on our website, [email or web address].            Nov 26, 2018  2:45 PM CST   (Arrive by 2:30 PM)   Fistula Consult with Calin Cheney MD   [facility] Solid Organ Transplant ([facility] [place])    [address]  [address]  [facility] [address] [phone]            Dec 03, 2018  9:30 AM CST   Procedure - 2.5 hour with U2A ROOM 12   [address] [facility] ([facility])    [address] [address]                  Care Instructions        Further instructions from your care team       [facility]                        Interventional Cardiology  Discharge Instructions   Post Right Heart Cath      AFTER YOU GO HOME:    DO drink plenty of fluids    DO resume your regular diet and medications unless otherwise instructed by your Primary Physician    Do Not scrub the procedure site vigorously    No lotion or powder to the puncture site for 3 days    CALL YOUR PRIMARY PHYSICIAN IF: You may resume all normal activity.  Monitor neck site for bleeding, swelling, or voice changes. If you notice bleeding or swelling immediately apply pressure to the site and call number below to speak with Cardiology Fellow.  If you experience any changes in your breathing you should call your doctor immediately or come to the closest Emergency Department.  Do not drive yourself.    ADDITIONAL INSTRUCTIONS: Medications: You are to resume all home medications including  anticoagulation therapy unless otherwise advised by your primary cardiologist or nurse coordinator.    Follow Up: Per your primary cardiology team    If you have any questions or concerns regarding your procedure site please call [phone] at anytime and ask for Cardiology Fellow on call.  They are available 24 hours a day.  You may also contact the Cardiology Clinic after hours number at [phone].                                                       Telephone Numbers [phone] Monday-Friday 8:00 am to 4:30 pm    [phone] After 4:30 pm Monday-Friday, Weekends & Holidays  Ask for Interventional Cardiologist on call. Someone is on call 24 hours/day   [facility] toll free number [phone] Monday-Friday 8:00 am to 4:30 pm   [facility] Emergency Dept [phone]                    Additional Information About Your Visit        MyChart Information     [employer] gives you secure access to your electronic health record. If you see a primary care provider, you can also send messages to your care team and make appointments. If you have questions, please call your primary care clinic.  If you do not have a primary care provider, please call [phone] and they will assist you.        Care EveryWhere ID     This is your Care EveryWhere ID. This could be used by other organizations to access your [place] medical records  [id number]         Primary Care Provider Office Phone # Fax #    Yahir Turcios [phone]      Equal Access to Services     JOHN HAUSER : Hadii aad ku hadasho Sobriandaali, waaxda luqadaha, qaybta kaalmada adeegyamerry, jose ayala. So [facility] [phone].    ATENCIÓN: Si habla español, tiene a ortiz disposición servicios gratuitos de asistencia lingüística. Llame al [phone].    We comply with applicable federal civil rights laws and [place] laws. We do not discriminate on the basis of race, color, national origin, age, disability, sex,  sexual orientation, or gender identity.            Thank you!     Thank you for choosing [facility] for your care. Our goal is always to provide you with excellent care. Hearing back from our patients is one way we can continue to improve our services. Please take a few minutes to complete the written survey that you may receive in the mail after you visit with us. Thank you!             Medication List: This is a list of all your medications and when to take them. Check marks below indicate your daily home schedule. Keep this list as a reference.      Medications           Morning Afternoon Evening Bedtime As Needed    albuterol 108 (90 Base) MCG/ACT inhaler   Commonly known as:  PROAIR HFA/PROVENTIL HFA/VENTOLIN HFA   Inhale 2 puffs into the lungs every 4 hours as needed for shortness of breath / dyspnea or wheezing                                amLODIPine 10 MG tablet   Commonly known as:  NORVASC   Take 1 tablet (10 mg) by mouth daily                                aspirin 81 MG tablet   Take 1 tablet (81 mg) by mouth at bedtime                                atorvastatin 40 MG tablet   Commonly known as:  LIPITOR   Take 1 tablet (40 mg) by mouth daily                                biotin 5 MG Caps   Commonly known as:  BIOTIN 5000   Take 5 mg by mouth daily                                blood glucose monitoring lancets   Use to test blood sugar 2-3 times daily or as directed.  Ok to substitute alternative if insurance prefers.                                blood glucose monitoring test strip   Commonly known as:  no brand specified   Use to test blood sugar 2-3 times daily or as directed.                                chlorhexidine 0.12 % solution   Commonly known as:  PERIDEX   Swish and spit 15 mLs in mouth 2 times daily                                ciprofloxacin 500 MG tablet   Commonly known as:  CIPRO   Take 1 tablet (500 mg) by mouth every 24 hours                                CLONIDINE HCL PO    Take 0.1 mg by mouth At Bedtime                                clotrimazole 10 MG kalpana   Place 1 Kalpana (10 mg) inside cheek 4 times daily                                fluticasone 50 MCG/ACT spray   Commonly known as:  FLONASE   Spray 1-2 sprays into both nostrils daily                                gentian violet 1 % solution   Take 0.5 mLs by mouth 4 times daily                                hydrALAZINE 100 MG Tabs tablet   Commonly known as:  APRESOLINE   Take 1 tablet (100 mg) by mouth every 8 hours                                insulin aspart 100 UNIT/ML injection   Commonly known as:  NovoLOG PEN   Inject 1-7 Units Subcutaneous 4 times daily (before meals and nightly)                                * insulin isophane human 100 UNIT/ML injection   Commonly known as:  HumuLIN N PEN   Inject 16 Units Subcutaneous every morning (before breakfast)                                * insulin isophane human 100 UNIT/ML injection   Commonly known as:  HumuLIN N PEN   Inject 12 Units Subcutaneous daily (with dinner)                                lisinopril 5 MG tablet   Commonly known as:  PRINIVIL/ZESTRIL   Take 1 tablet (5 mg) by mouth daily                                loratadine 10 MG tablet   Commonly known as:  CLARITIN   Take 10 mg by mouth daily Reported on 5/3/2017                                melatonin 1 MG Tabs tablet   Take 2 tablets (2 mg) by mouth nightly as needed for sleep                                mycophenolate 250 MG capsule   Commonly known as:  GENERIC EQUIVALENT   Take 2 capsules (500 mg) by mouth 2 times daily                                NEPHROCAPS 1 MG capsule   Take 1 capsule by mouth daily                                order for DME   Equipment being ordered: Carol () Treatment Diagnosis: ESRD on PD Pt has to be connected to PD all night and can not be disconnected, hence impending his mobility to go to the bathroom. At risk for infection if he does not have this  equipment.                                pantoprazole 40 MG EC tablet   Commonly known as:  PROTONIX   Take 1 tablet (40 mg) by mouth 2 times daily (before meals)                                pentamidine 300 MG neb solution   Commonly known as:  NEBUPENT   Inhale 300 mg into the lungs every 28 days Last given 9/19/18                                polyethylene glycol Packet   Commonly known as:  MIRALAX/GLYCOLAX   Take 17 g by mouth daily as needed for constipation                                potassium chloride SA 20 MEQ CR tablet   Commonly known as:  KLOR-CON   Take 1 tablet (20 mEq) by mouth daily as needed for potassium supplementation                                predniSONE 5 MG tablet   Commonly known as:  DELTASONE   Take 1 tablet (5 mg) by mouth daily                                rosuvastatin 20 MG tablet   Commonly known as:  CRESTOR   Take 1 tablet (20 mg) by mouth daily                                simethicone 80 MG chewable tablet   Commonly known as:  MYLICON   Take 1 tablet (80 mg) by mouth every 6 hours as needed for cramping                                sulfamethoxazole-trimethoprim 400-80 MG per tablet   Commonly known as:  BACTRIM/SEPTRA   Once per day on Tuesday, Thursday and Saturday. Take after dialysis on dialysis days.                                tacrolimus 1 MG capsule   Commonly known as:  GENERIC EQUIVALENT   Take 1 capsule (1 mg) by mouth 2 times daily                                traMADol 50 MG tablet   Commonly known as:  ULTRAM   Take 1 tablet (50 mg) by mouth every 12 hours as needed for moderate pain                                triamcinolone 0.1 % ointment   Commonly known as:  KENALOG   Apply topically 2 times daily                                Urea 40 % Crea   Externally apply topically daily                                * Notice:  This list has 2 medication(s) that are the same as other medications prescribed for you. Read the directions carefully, and ask  your doctor or other care provider to review them with you.

## 2018-11-05 NOTE — IP AVS SNAPSHOT
[address] [address]                                       After Visit Summary   11/5/2018    Murray Nicholson    MRN: [id number]           After Visit Summary Signature Page     I have received my discharge instructions, and my questions have been answered. I have discussed any challenges I see with this plan with the nurse or doctor.    ..........................................................................................................................................  Patient/Patient Representative Signature      ..........................................................................................................................................  Patient Representative Print Name and Relationship to Patient    ..................................................               ................................................  Date                                   Time    ..........................................................................................................................................  Reviewed by Signature/Title    ...................................................              ..............................................  Date                                               Time          22EPIC Rev 08/18

## 2018-11-05 NOTE — PROGRESS NOTES
Met with patient to discuss risks and benefits of planned heart biopsy. Risks including, but not limited to, bleeding, infection, and arrhythmia were discussed. Consent form completed, signed, and awaiting physician co-signature.       Bobby Vogt PA-C  [facility] Cardiology Team

## 2018-11-05 NOTE — PATIENT INSTRUCTIONS
Please call your coordinator at [phone] with any questions or concerns.      Coordinator will call with biopsy results and med changes    Cont weekly CMV

## 2018-11-05 NOTE — MR AVS SNAPSHOT
After Visit Summary   11/5/2018    Murray Nicholson    MRN: [id number]           Patient Information     Date Of Birth          1955        Visit Information        Provider Department      11/5/2018 4:30 PM Klever Ernst MD [facility]        Care Instructions    Please call your coordinator at [phone] with any questions or concerns.      Coordinator will call with biopsy results and med changes    Cont weekly CMV          Follow-ups after your visit        Your next 10 appointments already scheduled     Nov 13, 2018  8:00 AM CST   (Arrive by 7:45 AM)   Return Visit with Tristan Bañuelos MD   [facility] Ear Nose and Throat ([facility])    [address]  4th Floor  [place] [address] [phone]            Nov 16, 2018  9:40 AM CST   CT CHEST ABDOMEN PELVIS W/O & W CONTRAST with UCCT2   [facility] CT ([facility])    [address]  1st Floor  [place] [address] [phone]           How do I prepare for my exam? (Food and drink instructions) To prepare: Do not eat or drink for 2 hours before your exam. If you need to take medicine, you may take it with small sips of water. (We may ask you to take liquid medicine as well.)  How do I prepare for my exam? (Other instructions) Please arrive 30 minutes early for your CT.  Once in the department you might be asked to drink water 15-20 minutes prior to your exam.  If indicated you may be asked to drink an oral contrast in advance of your CT.  If this is the case, the imaging team will let you know or be in contact with you prior to your appointment  Patients over 70 or patients with diabetes or kidney problems: If you haven t had a blood test (creatinine test) within the last 30 days, the Cardiologist/Radiologist may require you to get this test prior to your exam.  If you have diabetes:  Continue to take your metformin medication on the day  of your exam  What should I wear: Please wear loose clothing, such as a sweat suit or jogging clothes. Avoid snaps, zippers and other metal. We may ask you to undress and put on a hospital gown.  How long does the exam take: Most scans take less than 20 minutes.  What should I bring: Please bring any scans or X-rays taken at other hospitals, if similar tests were done. Also bring a list of your medicines, including vitamins, minerals and over-the-counter drugs. It is safest to leave personal items at home.  Do I need a : No  is needed.  What do I need to tell my doctor? Be sure to tell your doctor: * If you have any allergies. * If there s any chance you are pregnant. * If you are breastfeeding.  What should I do after the exam: No restrictions, You may resume normal activities.  What is this test: A CT (computed tomography) scan is a series of pictures that allows us to look inside your body. The scanner creates images of the body in cross sections, much like slices of bread. This helps us see any problems more clearly. You may receive contrast (X-ray dye) before or during your scan. You will be asked to drink the contrast.  Who should I call with questions: If you have any questions, please call the Imaging Department where you will have your exam. Directions, parking instructions, and other information is available on our website, [email or web address].            Nov 16, 2018 11:30 AM CST   (Arrive by 11:15 AM)   PAC EVALUATION with MAYTE Lopez   [facility] ([facility])    [address]  4th Floor  [place] [address] [phone]            Nov 21, 2018   Procedure with Rick Tran MD   [facility] [place], Same Day Surgery (--)    [address] [address]   [phone]            Nov 26, 2018  1:30 PM CST   US VAS ARTERIOVENOUS MAP BILATERAL DIALYSIS ACCESS with UCUSV2   [facility]  Center US ([facility])    [address]  1st Floor  [place] [address] [phone]           How do I prepare for my exam? (Food and drink instructions) No Food and Drink Restrictions.  How do I prepare for my exam? (Other instructions) You do not need to do anything special to prepare for your exam.  What should I wear: Wear comfortable clothes.  How long does the exam take: Most ultrasounds take 30 to 60 minutes.  What should I bring: Bring a list of your medicines, including vitamins, minerals and over-the-counter drugs. It is safest to leave personal items at home.  Do I need a :  No  is needed.  What do I need to tell my doctor: Tell your doctor about any allergies you may have.  What should I do after the exam: No restrictions, You may resume normal activities.  What is this test: An ultrasound uses sound waves to make pictures of the body. Sound waves do not cause pain. The only discomfort may be the pressure of the wand against your skin or full bladder.  Who should I call with questions: If you have any questions, please call the Imaging Department where you will have your exam. Directions, parking instructions, and other information is available on our website, [email or web address].            Nov 26, 2018  2:45 PM CST   (Arrive by 2:30 PM)   Fistula Consult with Calin Cheney MD   [facility] Solid Organ Transplant ([facility])    [address]  [address]  [place] [address] [phone]            Dec 03, 2018  8:00 AM CST   LAB with UU LAB GOLD WAITING   [facility] ([facility] [place])    [address] [address]              Please do not eat 10-12 hours before your appointment if you are coming in fasting for labs on lipids, cholesterol, or glucose (sugar). This does not apply to pregnant women. Water, hot tea and black coffee (with nothing  added) are okay. Do not drink other fluids, diet soda or chew gum.            Dec 03, 2018  8:30 AM CST   Ech Complete with UUECHR2   [facility] ([facility])    [address] [address]   [phone]           1.  Please bring or wear a comfortable two-piece outfit. 2.  You may eat, drink and take your normal medicines. 3.  For any questions that cannot be answered, please contact the ordering physician 4.  Please do not wear perfumes or scented lotions on the day of your exam.            Dec 03, 2018  9:30 AM CST   Procedure - 2.5 hour with U2A ROOM 12   [address] [facility] [place] ([facility])    [address] [address]               Dec 03, 2018 10:30 AM CST   Cath 90 Minute with UUHCVR5   [facility] ([facility])    [address] [address] [phone]              Future tests that were ordered for you today     Open Future Orders        Priority Expected Expires Ordered    Hemoglobin A1c Routine  11/5/2019 11/5/2018            Who to contact     If you have questions or need follow up information about today's clinic visit or your schedule please contact [facility] directly at [phone].  Normal or non-critical lab and imaging results will be communicated to you by MyChart, letter or phone within 4 business days after the clinic has received the results. If you do not hear from us within 7 days, please contact the clinic through [employer] or phone. If you have a critical or abnormal lab result, we will notify you by phone as soon as possible.  Submit refill requests through [employer] or call your pharmacy and they will forward the refill request to us. Please allow 3 business days for your refill to be completed.          Additional Information About Your Visit        My[employer] Information      "[employer] gives you secure access to your electronic health record. If you see a primary care provider, you can also send messages to your care team and make appointments. If you have questions, please call your primary care clinic.  If you do not have a primary care provider, please call [phone] and they will assist you.        Care EveryWhere ID     This is your Care EveryWhere ID. This could be used by other organizations to access your [place] medical records  [id number]        Your Vitals Were     Pulse Height Pulse Oximetry BMI (Body Mass Index)          101 1.753 m (5' 9\") 100% 25.84 kg/m2         Blood Pressure from Last 3 Encounters:   11/05/18 132/77   11/05/18 (!) 168/94   10/31/18 125/71    Weight from Last 3 Encounters:   11/05/18 79.4 kg (175 lb)   11/05/18 77 kg (169 lb 12.1 oz)   10/31/18 77.8 kg (171 lb 9.6 oz)              Today, you had the following     No orders found for display       Primary Care Provider Office Phone # Fax #    Yahir Alex Turcios [phone]       [address]  [facility] [address]        Goals        Lifestyle    Increase physical activity     Notes - Note created  7/3/2018  1:51 PM by Carrie Tran, RN    Goal Statement: I will start cardiac rehab  Measure of Success: starts cardiac rehab  Supportive Steps to Achieve: support of RN CC  Barriers: none  Strengths: willingness to participate   Date to Achieve By: 7-15-18          Equal Access to Services     JOHN HAUSER AH: Hadii aad ku hadasho Soomaali, waaxda luqadaha, qaybta kaalmada adeegyada, waxay jay palencia . So [facility] [phone].    ATENCIÓN: Si habla español, tiene a ortiz disposición servicios gratuitos de asistencia lingüística. Llame al [phone].    We comply with applicable federal civil rights laws and [place] laws. We do not discriminate on the basis of race, color, national origin, age, disability, sex, sexual orientation, or gender identity.            Thank " you!     Thank you for choosing [facility]  for your care. Our goal is always to provide you with excellent care. Hearing back from our patients is one way we can continue to improve our services. Please take a few minutes to complete the written survey that you may receive in the mail after your visit with us. Thank you!             Your Updated Medication List - Protect others around you: Learn how to safely use, store and throw away your medicines at www.disposemymeds.org.          This list is accurate as of 11/5/18  4:34 PM.  Always use your most recent med list.                   Brand Name Dispense Instructions for use Diagnosis    albuterol 108 (90 Base) MCG/ACT inhaler    PROAIR HFA/PROVENTIL HFA/VENTOLIN HFA     Inhale 2 puffs into the lungs every 4 hours as needed for shortness of breath / dyspnea or wheezing        amLODIPine 10 MG tablet    NORVASC    90 tablet    Take 1 tablet (10 mg) by mouth daily    Hypertension goal BP (blood pressure) < 130/80       aspirin 81 MG tablet      Take 1 tablet (81 mg) by mouth at bedtime        atorvastatin 40 MG tablet    LIPITOR    90 tablet    Take 1 tablet (40 mg) by mouth daily    Heart replaced by transplant (H)       biotin 5 MG Caps    BIOTIN 5000    30 capsule    Take 5 mg by mouth daily    Brittle nails       blood glucose monitoring lancets     102 each    Use to test blood sugar 2-3 times daily or as directed.  Ok to substitute alternative if insurance prefers.    Type 2 diabetes mellitus with stage 5 chronic kidney disease not on chronic dialysis, without long-term current use of insulin (H)       blood glucose monitoring test strip    no brand specified    100 each    Use to test blood sugar 2-3 times daily or as directed.    Type 2 diabetes mellitus with stage 5 chronic kidney disease not on chronic dialysis, without long-term current use of insulin (H)       chlorhexidine 0.12 % solution    PERIDEX    900 mL    Swish and spit 15 mLs in mouth 2  times daily    Oral ulceration       ciprofloxacin 500 MG tablet    CIPRO    14 tablet    Take 1 tablet (500 mg) by mouth every 24 hours    Oral ulceration       CLONIDINE HCL PO      Take 0.1 mg by mouth At Bedtime        clotrimazole 10 MG kalpana     120 Kalpana    Place 1 Kalpana (10 mg) inside cheek 4 times daily    Candidiasis of mouth       fluticasone 50 MCG/ACT spray    FLONASE    16 g    Spray 1-2 sprays into both nostrils daily    Nasal congestion       gentian violet 1 % solution     30 mL    Take 0.5 mLs by mouth 4 times daily    Oral ulceration       hydrALAZINE 100 MG Tabs tablet    APRESOLINE    90 tablet    Take 1 tablet (100 mg) by mouth every 8 hours    Essential hypertension       insulin aspart 100 UNIT/ML injection    NovoLOG PEN    9 mL    Inject 1-7 Units Subcutaneous 4 times daily (before meals and nightly)    Type 2 diabetes mellitus with diabetic nephropathy, with long-term current use of insulin (H)       * insulin isophane human 100 UNIT/ML injection    HumuLIN N PEN    6 mL    Inject 16 Units Subcutaneous every morning (before breakfast)    Type 2 diabetes mellitus with diabetic nephropathy, with long-term current use of insulin (H)       * insulin isophane human 100 UNIT/ML injection    HumuLIN N PEN    3 mL    Inject 12 Units Subcutaneous daily (with dinner)    Type 2 diabetes mellitus with diabetic nephropathy, with long-term current use of insulin (H)       lisinopril 5 MG tablet    PRINIVIL/ZESTRIL    90 tablet    Take 1 tablet (5 mg) by mouth daily    Hypertension goal BP (blood pressure) < 130/80       loratadine 10 MG tablet    CLARITIN     Take 10 mg by mouth daily Reported on 5/3/2017    Hypertensive cardiopathy, SOB (shortness of breath)       melatonin 1 MG Tabs tablet     120 tablet    Take 2 tablets (2 mg) by mouth nightly as needed for sleep    Heart transplanted (H)       mycophenolate 250 MG capsule    GENERIC EQUIVALENT    120 capsule    Take 2 capsules (500 mg) by mouth  2 times daily    Heart transplanted (H)       NEPHROCAPS 1 MG capsule     120 capsule    Take 1 capsule by mouth daily        order for DME     1 Units    Equipment being ordered: Carol () Treatment Diagnosis: ESRD on PD Pt has to be connected to PD all night and can not be disconnected, hence impending his mobility to go to the bathroom. At risk for infection if he does not have this equipment.    CKD (chronic kidney disease) stage 5, GFR less than 15 ml/min (H)       pantoprazole 40 MG EC tablet    PROTONIX    60 tablet    Take 1 tablet (40 mg) by mouth 2 times daily (before meals)    Duodenitis       pentamidine 300 MG neb solution    NEBUPENT    300 mg    Inhale 300 mg into the lungs every 28 days Last given 9/19/18    Heart replaced by transplant (H)       polyethylene glycol Packet    MIRALAX/GLYCOLAX    7 packet    Take 17 g by mouth daily as needed for constipation    Constipation, unspecified constipation type       potassium chloride SA 20 MEQ CR tablet    KLOR-CON    3 tablet    Take 1 tablet (20 mEq) by mouth daily as needed for potassium supplementation    Heart replaced by transplant (H)       predniSONE 5 MG tablet    DELTASONE    60 tablet    Take 1 tablet (5 mg) by mouth daily    Heart transplanted (H)       rosuvastatin 20 MG tablet    CRESTOR    30 tablet    Take 1 tablet (20 mg) by mouth daily    Heart transplant recipient (H)       simethicone 80 MG chewable tablet    MYLICON    180 tablet    Take 1 tablet (80 mg) by mouth every 6 hours as needed for cramping    Flatulence, eructation and gas pain       sulfamethoxazole-trimethoprim 400-80 MG per tablet    BACTRIM/SEPTRA    14 tablet    Once per day on Tuesday, Thursday and Saturday. Take after dialysis on dialysis days.    Heart transplanted (H)       tacrolimus 1 MG capsule    GENERIC EQUIVALENT    60 capsule    Take 1 capsule (1 mg) by mouth 2 times daily    Heart transplanted (H)       traMADol 50 MG tablet    ULTRAM    10 tablet     Take 1 tablet (50 mg) by mouth every 12 hours as needed for moderate pain    Moderate pain       triamcinolone 0.1 % ointment    KENALOG    80 g    Apply topically 2 times daily    Xerosis of skin       Urea 40 % Crea     85 g    Externally apply topically daily    Brittle nails       * Notice:  This list has 2 medication(s) that are the same as other medications prescribed for you. Read the directions carefully, and ask your doctor or other care provider to review them with you.

## 2018-11-05 NOTE — PATIENT INSTRUCTIONS
Recommendations from today's MTM visit:                                                      1. Replace your midday popcorn with a snack high in fiber and protein. I included some guidance in light of your kidney diseas attached to the back on this document. Remain aware of how your snack choice affects our evening blood sugars.    Next MTM visit: 12/3 at 3:00pm    To schedule another MTM appointment, please call the clinic directly or you may call the MTM scheduling line at [phone] or toll-free at [phone].     My Clinical Pharmacist's contact information:                                                      It was a pleasure talking with you today!  Please feel free to contact me with any questions or concerns you have.      Eduardo Lea, PharmD  MTM Pharmacist    Phone: [phone]     You may receive a survey about the MTM services you received.  I would appreciate your feedback to help me serve you better in the future. Please fill it out and return it when you can. Your comments will be anonymous.

## 2018-11-06 LAB — COPATH REPORT: NORMAL

## 2018-11-06 RX ORDER — CLONIDINE HYDROCHLORIDE 0.1 MG/1
0.1 TABLET ORAL AT BEDTIME
Qty: 90 TABLET | Refills: 3 | Status: SHIPPED | OUTPATIENT
Start: 2018-11-06 | End: 2019-07-08

## 2018-11-06 NOTE — NURSING NOTE
Transplant Coordinator Note  Reason for visit: 5th month post heart transplant  Coordinator: Present Lillie Ulloa   Caregiver:  LUIS    Health concerns addressed today:  Pt seen with Dr Ernst for routine follow up. Over all pt reports things are going well. VS, afebrile, and wght stable. Mouth sore improving, CRP 11. Cont antibiotics x3 more weeks per ID. Active.       Immunosuppressants:   mg BID (WBC 5.6)  FK 1 mg BID (goal 6-8 -> closer to 8 per TT). Today's level pending.   Prednisone 5 mg daily. (plan to discontinue if biopsy NER)     Preventive care:  CMV - off Valctye due to low WBC - weekly CMV checks to month 6 (CMV mismatch)  Bactrim  - cont until off Pred   Kalpana for thrush prevention    Labs: Reviewed; copy provided    Medication record reviewed and reconciled  Questions and concerns addressed. Copy of AVS provided.    Pt verbalized an understanding of plan of care.     Patient Instructions  ~Please call your coordinator at [phone] with any questions or concerns.    ~Coordinator will call with biopsy results and med changes  ~Cont weekly CMV

## 2018-11-06 NOTE — PROGRESS NOTES
2018     Yahir Turcios MD    [facility]    [address] [place]  [address]       Ana Luisa Mcpherson MD    [facility]    [address], [facility] [address]    [place]  [address]       RE: Murray Nicholson   MRN: [id number]   : 1955      Dear Dr. Turcios and Dr. Mcpherson:      We had the pleasure of seeing Mr. Murray Nicholson for followup in our Cardiac Transplant Clinic at the [facility].  As you know, he is a very pleasant 63-year-old male who underwent orthotopic heart transplantation on 2018.  He is currently listed for kidney transplantation.  He had a very complex postoperative course.  His current problem list includes:     1.  Status post orthotopic heart transplantation.   2.  Neutropenia.   3.  Oral mucosal ulcer secondary to neutropenia with Klebsiella infection.   4.  Pseudomonas bacteremia, resolved.   5.  Perforation of the small bowel, status post small-bowel resection and ileostomy.   6.  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   7.  Hypertension.   8.  Hyperlipidemia.   9.  End-stage renal disease requiring hemodialysis.      He returns today for follow up.  He is doing okay.  He has not no specific complaints.  He no longer has fever or chills.  Is tolerating the dialysis without any significant side effects.  His white blood cell has remained stable after restarting CellCept.  He was seen by ENT and had an excision of this lesion in his hard palate.  He was also seen by transplant infectious disease and recommended to continue ciprofloxacin for additional 3 weeks.  His CRP is downtrending and low.  Is scheduled for a retake of his colostomy second week of December.  His weekly CMV PCR has remind negative.       Current Outpatient Prescriptions   Medication Sig     albuterol (PROAIR HFA/PROVENTIL HFA/VENTOLIN HFA) 108 (90 Base) MCG/ACT inhaler Inhale 2 puffs into the lungs every 4 hours as needed for shortness of breath / dyspnea  or wheezing     amLODIPine (NORVASC) 10 MG tablet Take 1 tablet (10 mg) by mouth daily     ASPIRIN 81 MG OR TABS Take 1 tablet (81 mg) by mouth at bedtime     atorvastatin (LIPITOR) 40 MG tablet Take 1 tablet (40 mg) by mouth daily     biotin (BIOTIN 5000) 5 MG CAPS Take 5 mg by mouth daily     blood glucose monitoring (ACCU-CHEK FASTCLIX) lancets Use to test blood sugar 2-3 times daily or as directed.  Ok to substitute alternative if insurance prefers.     blood glucose monitoring (NO BRAND SPECIFIED) test strip Use to test blood sugar 2-3 times daily or as directed.     chlorhexidine (PERIDEX) 0.12 % solution Swish and spit 15 mLs in mouth 2 times daily     ciprofloxacin (CIPRO) 500 MG tablet Take 1 tablet (500 mg) by mouth every 24 hours     cloNIDine (CATAPRES) 0.1 MG tablet Take 1 tablet (0.1 mg) by mouth At Bedtime     clotrimazole 10 MG kalpana Place 1 Kalpana (10 mg) inside cheek 4 times daily     fluticasone (FLONASE) 50 MCG/ACT spray Spray 1-2 sprays into both nostrils daily     gentian violet 1 % solution Take 0.5 mLs by mouth 4 times daily     hydrALAZINE (APRESOLINE) 100 MG TABS tablet Take 1 tablet (100 mg) by mouth every 8 hours     insulin aspart (NOVOLOG PEN) 100 UNIT/ML injection Inject 1-7 Units Subcutaneous 4 times daily (before meals and nightly)     insulin isophane human (HUMULIN N PEN) 100 UNIT/ML injection Inject 16 Units Subcutaneous every morning (before breakfast)     insulin isophane human (HUMULIN N PEN) 100 UNIT/ML injection Inject 12 Units Subcutaneous daily (with dinner)     lisinopril (PRINIVIL/ZESTRIL) 5 MG tablet Take 1 tablet (5 mg) by mouth daily     loratadine (CLARITIN) 10 MG tablet Take 10 mg by mouth daily Reported on 5/3/2017     melatonin 1 MG TABS tablet Take 2 tablets (2 mg) by mouth nightly as needed for sleep     mycophenolate (GENERIC EQUIVALENT) 250 MG capsule Take 2 capsules (500 mg) by mouth 2 times daily     NEPHROCAPS 1 MG capsule Take 1 capsule by mouth daily      order for DME Equipment being ordered: Carol ()  Treatment Diagnosis: ESRD on PD  Pt has to be connected to PD all night and can not be disconnected, hence impending his mobility to go to the bathroom. At risk for infection if he does not have this equipment.     pantoprazole (PROTONIX) 40 MG EC tablet Take 1 tablet (40 mg) by mouth 2 times daily (before meals)     pentamidine (NEBUPENT) 300 MG neb solution Inhale 300 mg into the lungs every 28 days Last given 9/19/18     polyethylene glycol (MIRALAX/GLYCOLAX) Packet Take 17 g by mouth daily as needed for constipation     potassium chloride SA (KLOR-CON) 20 MEQ CR tablet Take 1 tablet (20 mEq) by mouth daily as needed for potassium supplementation (Patient not taking: Reported on 10/31/2018)     predniSONE (DELTASONE) 5 MG tablet Take 1 tablet (5 mg) by mouth daily     rosuvastatin (CRESTOR) 20 MG tablet Take 1 tablet (20 mg) by mouth daily (Patient not taking: Reported on 10/31/2018)     simethicone (MYLICON) 80 MG chewable tablet Take 1 tablet (80 mg) by mouth every 6 hours as needed for cramping     sulfamethoxazole-trimethoprim (BACTRIM/SEPTRA) 400-80 MG per tablet Once per day on Tuesday, Thursday and Saturday. Take after dialysis on dialysis days.     tacrolimus (GENERIC EQUIVALENT) 1 MG capsule Take 1 capsule (1 mg) by mouth 2 times daily     traMADol (ULTRAM) 50 MG tablet Take 1 tablet (50 mg) by mouth every 12 hours as needed for moderate pain     triamcinolone (KENALOG) 0.1 % ointment Apply topically 2 times daily     Urea 40 % CREA Externally apply topically daily     No current facility-administered medications for this visit.         REVIEW OF SYSTEMS:  A detailed 10-point review of systems was obtained as described in History of Present Illness.  All other systems are reviewed and are negative.      PHYSICAL EXAMINATION:   GENERAL:  He was comfortable.  He was in no apparent distress.   /77 (BP Location: Left arm, Patient Position: Chair,  "Cuff Size: Adult Large)  Pulse 101  Ht 1.753 m (5' 9\")  Wt 79.4 kg (175 lb)  SpO2 100%  BMI 25.84 kg/m2  \HEENT:  No pallor, cyanosis, or jaundice.  Examination of the oral cavity revealed an ulcerative lesion on the upper palate covered with granulation tissue, which looks better when compared to last week.  Otherwise, oral cavity examination is normal.   NECK:  Neck exam reveals no jugular venous distention.  Carotids were 2+.  No lymphadenopathy.   LUNGS:  Clear to auscultation bilaterally.   CARDIAC:  Cardiac auscultation revealed normal S1, S2 with no murmur, rub or gallop.   LUNGS:  Auscultation of the lungs revealed equal air entry on both.     ABDOMEN:  Abdomen was soft with normal bowel sounds.  Ostomy site looks normal.   SKIN:  Skin exam is normal.   NEUROLOGIC:  He had no focal neurological deficit.   EXTREMITIES:  Showed 1+ pitting edema.      CBC RESULTS:   Recent Labs   Lab Test  11/05/18   0938   WBC  5.6   RBC  3.87*   HGB  11.4*   HCT  37.2*   MCV  96   MCH  29.5   MCHC  30.6*   RDW  19.7*   PLT  275     Recent Labs   Lab Test  11/05/18   0938  10/26/18   0739   NA  137  132*   POTASSIUM  3.9  4.2   CHLORIDE  103  99   CO2  26  25   ANIONGAP  8  9   GLC  94  196*   BUN  46*  25   CR  6.20*  3.83*   TRINI  9.7  9.0     Liver Function Studies -   Recent Labs   Lab Test  10/26/18   0739   PROTTOTAL  7.3   ALBUMIN  3.0*   BILITOTAL  0.5   ALKPHOS  155*   AST  15   ALT  19     Prograf - 7.5    CMV - negative.     Immunknow - 713         ASSESSMENT AND PLAN:      Mr. Murray Nicholson is a 63-year-old male status post orthotopic heart transplantation in June with a very complex postoperative course who returns today for followup.     1.  Orthotopic heart transplantation.  Mr. Nicholson has normal graft function.  He has not had any evidence of rejection, fortunately, so far.  His biopsies have been negative.  His graft systolic function has been normal.  Will discontinue prednisone today if his biopsy from " today is negative.  We will increase his CellCept to 750 mg twice a day.  His white blood cell count have remained stable.  He will continue on tacrolimus with a target goal of 6-8 given his multiple recent infections.  He is on aspirin and Crestor for primary prevention of allograft vasculopathy.     2.  Hypertension.  His blood pressure is currently controlled on hydralazine 100 mg 3 times a day, lisinopril 5 mg and Norvasc 10 mg.     3.  End-stage renal disease.  He continues to remain on dialysis 3 times a week.  He has been tolerating it with no problems.  His dry weight has recently been increased.  With that, he is not having any lightheadedness or dizziness.  He is getting vein mapping in anticipation of an AV fistula placement.    4.  Neutropenia.  His white blood cell count has been stable on low-dose of CellCept.  He remains of bactrim, which he no longer requires as he has been weaned off prednisone.  He reminds off of Valcyte with weekly CMV checks.  His CMV PCR's have remained negative.  He is 5 months out of transplant and needs 1 more month of close monitoring.  We will continue to monitor.      5.  High risk for CMV.  He is off of Valcyte secondary to neutropenia.  We have been doing weekly CMV checks.  This has been negative.     6.  Oral ulcer with Klebsiella.  He has been evaluated by ENT.  He had a bedside debridement.  His CT scan shows no evidence of osteomyelitis at this time and point.  He is currently on empiric ciprofloxacin. This looks like this is slowly getting better.  He is also on the chlorhexidine swish and swallow twice a day.  He will continue on ciprofloxacin for additional 3 weeks as per transplant ID recommendations.    7.  Perforated bowel, status post colostomy, doing well, tolerating diet, normal ostomy site output.  Plan for takedown of his colostomy second week of December.    8.  Low-grade fever with elevated CRP.  -He no longer has fever.  His CRP has been downtrending.   He is on ciprofloxacin 500 mg daily.      9.  Diabetes.  He is on NPH insulin.  He also uses NovoLog as needed with a sliding scale.       RTC as per protocol. He will call in the interim if he has any worsening symptoms.       Sincerely,   Klever Ernst MD   [facility]  Heart Failure, Transplant, and Mechanical Circulatory Support Cardiology   Cardiovascular Division  [facility]   [phone]

## 2018-11-07 ENCOUNTER — TELEPHONE (OUTPATIENT)
Dept: TRANSPLANT | Facility: CLINIC | Age: 63
End: 2018-11-07

## 2018-11-07 DIAGNOSIS — Z94.1 HEART TRANSPLANTED (H): ICD-10-CM

## 2018-11-07 LAB
CMV DNA SPEC NAA+PROBE-ACNC: NORMAL [IU]/ML
CMV DNA SPEC NAA+PROBE-LOG#: NORMAL {LOG_IU}/ML
LAB SCANNED RESULT: NORMAL
SPECIMEN SOURCE: NORMAL

## 2018-11-07 RX ORDER — MYCOPHENOLATE MOFETIL 250 MG/1
750 CAPSULE ORAL 2 TIMES DAILY
Qty: 180 CAPSULE | Refills: 11 | Status: SHIPPED | OUTPATIENT
Start: 2018-11-07 | End: 2018-11-20

## 2018-11-07 NOTE — TELEPHONE ENCOUNTER
FK level 7.6; no change per Dr TT  Biopsy: HEART, ALLOGRAFT, ENDOMYOCARDIAL BIOPSY:   - ISHLT 2004 cellular grade: 1R (formerly 1A)        - Mild focal acute cellular rejection   - Endocardial lymphocytic infiltrate consistent with Quilty effect   - ISHLT 2013 antibody-mediated grade: pAMR1(I+)        - No histological features diagnostic of antibody-mediated rejection        - Capillary staining for C4d present (40%, weak-moderate)        - Capillary staining for C3d present (15%, weak)    Per  TT:  Cont 5 mg pred. Increase MMF to 750 mg BID.   Recheck CBC in 2 weeks. If >4, increase MMF to 1000 mg BID and discontinue pred. If <4, discuss with w/TT.  Cont weekly labs to check, CBC, CRP, BMP  Next biopsy Dec 3.     Pt called with results and next steps.

## 2018-11-12 DIAGNOSIS — K29.80 DUODENITIS: ICD-10-CM

## 2018-11-12 RX ORDER — PANTOPRAZOLE SODIUM 40 MG/1
40 TABLET, DELAYED RELEASE ORAL
Qty: 60 TABLET | Refills: 11 | Status: SHIPPED | OUTPATIENT
Start: 2018-11-12 | End: 2019-11-13

## 2018-11-13 ENCOUNTER — RESULTS ONLY (OUTPATIENT)
Dept: OTHER | Facility: CLINIC | Age: 63
End: 2018-11-13

## 2018-11-13 ENCOUNTER — OFFICE VISIT (OUTPATIENT)
Dept: OTOLARYNGOLOGY | Facility: CLINIC | Age: 63
End: 2018-11-13
Payer: MEDICARE

## 2018-11-13 VITALS — HEIGHT: 69 IN | WEIGHT: 173 LBS | BODY MASS INDEX: 25.62 KG/M2

## 2018-11-13 DIAGNOSIS — E11.22 TYPE 2 DIABETES MELLITUS WITH STAGE 5 CHRONIC KIDNEY DISEASE NOT ON CHRONIC DIALYSIS, WITHOUT LONG-TERM CURRENT USE OF INSULIN (H): ICD-10-CM

## 2018-11-13 DIAGNOSIS — Z94.1 HEART TRANSPLANTED (H): ICD-10-CM

## 2018-11-13 DIAGNOSIS — Z94.1 HEART REPLACED BY TRANSPLANT (H): ICD-10-CM

## 2018-11-13 DIAGNOSIS — M1A.0390 CHRONIC GOUT OF WRIST, UNSPECIFIED CAUSE, UNSPECIFIED LATERALITY: ICD-10-CM

## 2018-11-13 DIAGNOSIS — M10.9 GOUT, UNSPECIFIED CAUSE, UNSPECIFIED CHRONICITY, UNSPECIFIED SITE: ICD-10-CM

## 2018-11-13 DIAGNOSIS — K13.70 ORAL LESION: Primary | ICD-10-CM

## 2018-11-13 DIAGNOSIS — N18.5 TYPE 2 DIABETES MELLITUS WITH STAGE 5 CHRONIC KIDNEY DISEASE NOT ON CHRONIC DIALYSIS, WITHOUT LONG-TERM CURRENT USE OF INSULIN (H): ICD-10-CM

## 2018-11-13 DIAGNOSIS — Z76.82 AWAITING ORGAN TRANSPLANT: ICD-10-CM

## 2018-11-13 DIAGNOSIS — N18.6 ESRD (END STAGE RENAL DISEASE) (H): ICD-10-CM

## 2018-11-13 LAB
ALP SERPL-CCNC: 187 U/L (ref 40–150)
ALT SERPL W P-5'-P-CCNC: 16 U/L (ref 0–70)
ANION GAP SERPL CALCULATED.3IONS-SCNC: 10 MMOL/L (ref 3–14)
AST SERPL W P-5'-P-CCNC: 16 U/L (ref 0–45)
BILIRUB SERPL-MCNC: 0.5 MG/DL (ref 0.2–1.3)
BUN SERPL-MCNC: 63 MG/DL (ref 7–30)
CALCIUM SERPL-MCNC: 9.2 MG/DL (ref 8.5–10.1)
CHLORIDE SERPL-SCNC: 100 MMOL/L (ref 94–109)
CO2 SERPL-SCNC: 23 MMOL/L (ref 20–32)
CREAT SERPL-MCNC: 7.87 MG/DL (ref 0.66–1.25)
CRP SERPL-MCNC: 11.6 MG/L (ref 0–8)
ERYTHROCYTE [DISTWIDTH] IN BLOOD BY AUTOMATED COUNT: 19 % (ref 10–15)
GFR SERPL CREATININE-BSD FRML MDRD: 7 ML/MIN/1.7M2
GLUCOSE SERPL-MCNC: 181 MG/DL (ref 70–99)
HBA1C MFR BLD: 5.2 % (ref 0–5.6)
HCT VFR BLD AUTO: 37.6 % (ref 40–53)
HGB BLD-MCNC: 11.7 G/DL (ref 13.3–17.7)
MCH RBC QN AUTO: 29 PG (ref 26.5–33)
MCHC RBC AUTO-ENTMCNC: 31.1 G/DL (ref 31.5–36.5)
MCV RBC AUTO: 93 FL (ref 78–100)
PLATELET # BLD AUTO: 321 10E9/L (ref 150–450)
POTASSIUM SERPL-SCNC: 4.9 MMOL/L (ref 3.4–5.3)
RBC # BLD AUTO: 4.04 10E12/L (ref 4.4–5.9)
SODIUM SERPL-SCNC: 132 MMOL/L (ref 133–144)
URATE SERPL-MCNC: 6.2 MG/DL (ref 3.5–7.2)
WBC # BLD AUTO: 6.8 10E9/L (ref 4–11)

## 2018-11-13 ASSESSMENT — PAIN SCALES - GENERAL: PAINLEVEL: NO PAIN (0)

## 2018-11-13 NOTE — NURSING NOTE
Chief Complaint   Patient presents with     RECHECK     palate ulceration on antibiotics has roughly 1 week left      Jairo Guadarrama, EMT

## 2018-11-13 NOTE — LETTER
11/13/2018       RE: Murray Nicholson  [address]  [place] [address]     Dear Colleague,    Thank you for referring your patient, Murray Nicholson, to the [facility] EAR NOSE AND THROAT at [facility]. Please see a copy of my visit note below.    HISTORY OF PRESENT ILLNESS:  Mr. Nicholson is 63 years of age.  He is here for follow-up today.  He has had a history of a strange mass on the roof of his mouth.  He has been through transplantation.  We took the mass off and it showed  food debris and necrotic material.  There is no cancer or anything else in the relatively large biopsy I took before.  He still feels a little bit of unusual nature when he palpates roof of his mouth still, but he has not had any regrowth of this type of thing like what he had that I removed last time.  It looks like it was some kind of unusual chronic ulcer infection.      PHYSICAL EXAMINATION:  The patient is alert, oriented x3 and pleasant.  Skin of the face, lips, and neck on him is quite normal.  When I look into his oral cavity, his dentition is not loose.  The anterior portion of his gums is normal, but around the area of the formation on the anterior hard palate, there still remains an area that looks cobblestoned, but this looks less dramatic than it did last time.  There is a little area where there has either been bleeding or similar on the actual palatal bone.  He has had a chronic process that seems to be stable on two sets of CAT scans on the roof of the mouth now.   They do not seem to be growing.  He is having less symptoms than he was before.  He is not having much in the way of pain.  He will brush these areas as well.      ASSESSMENT:  Patient with a history of oral cavity ulcer.  It was on the roof of his mouth and is a most usual ulcer.       PLAN:   I told him to use Mycelex troches on the top of his tongue rather than below them.  He is to come back and see me in about three  weeks.  If this area does not seem to be significantly improved, I might biopsy some of the cobblestoning to look for either invasive fungus or similar.  He is not really a tobacco user.  This does not look like cancer to me, but it certainly is a most unusual oral cavity mass.      FO/ms         Again, thank you for allowing me to participate in the care of your patient.      Sincerely,    Tristan Bañuelos MD

## 2018-11-13 NOTE — MR AVS SNAPSHOT
After Visit Summary   11/13/2018    Murray Nicholson    MRN: [id number]           Patient Information     Date Of Birth          1955        Visit Information        Provider Department      11/13/2018 8:00 AM Tristan Bañuelos MD [facility] Ear Nose and Throat        Today's Diagnoses     Oral lesion    -  1      Care Instructions    Thank you for choosing  Physicians.  Please follow up with Dr. Bañuelos in approximately in 3 weeks.    [phone] appointment scheduling option 1 and nurse advice option 3.            Follow-ups after your visit        Follow-up notes from your care team     Return in about 3 weeks (around 12/4/2018).      Your next 10 appointments already scheduled     Nov 16, 2018  9:40 AM CST   CT CHEST ABDOMEN PELVIS W/O & W CONTRAST with UCCT2   [facility] CT ([facility])    [address] [place] [address] [phone]           How do I prepare for my exam? (Food and drink instructions) To prepare: Do not eat or drink for 2 hours before your exam. If you need to take medicine, you may take it with small sips of water. (We may ask you to take liquid medicine as well.)  How do I prepare for my exam? (Other instructions) Please arrive 30 minutes early for your CT.  Once in the department you might be asked to drink water 15-20 minutes prior to your exam.  If indicated you may be asked to drink an oral contrast in advance of your CT.  If this is the case, the imaging team will let you know or be in contact with you prior to your appointment  Patients over 70 or patients with diabetes or kidney problems: If you haven t had a blood test (creatinine test) within the last 30 days, the Cardiologist/Radiologist may require you to get this test prior to your exam.  If you have diabetes:  Continue to take your metformin medication on the day of your exam  What should I wear: Please wear loose clothing, such as a sweat  suit or jogging clothes. Avoid snaps, zippers and other metal. We may ask you to undress and put on a hospital gown.  How long does the exam take: Most scans take less than 20 minutes.  What should I bring: Please bring any scans or X-rays taken at other hospitals, if similar tests were done. Also bring a list of your medicines, including vitamins, minerals and over-the-counter drugs. It is safest to leave personal items at home.  Do I need a : No  is needed.  What do I need to tell my doctor? Be sure to tell your doctor: * If you have any allergies. * If there s any chance you are pregnant. * If you are breastfeeding.  What should I do after the exam: No restrictions, You may resume normal activities.  What is this test: A CT (computed tomography) scan is a series of pictures that allows us to look inside your body. The scanner creates images of the body in cross sections, much like slices of bread. This helps us see any problems more clearly. You may receive contrast (X-ray dye) before or during your scan. You will be asked to drink the contrast.  Who should I call with questions: If you have any questions, please call the Imaging Department where you will have your exam. Directions, parking instructions, and other information is available on our website, [email or web address].            Nov 16, 2018 11:30 AM CST   (Arrive by 11:15 AM)   PAC EVALUATION with MAYTE Lopez   [facility] ([facility])    [address] [place] [address]  4th Floor  [facility] [address] [phone]            Nov 19, 2018  9:00 AM CST   LAB with  LAB   [facility] Lab ([facility])    [address] [place] [address]  1st Floor  [facility] [id number] [phone]           Please do not eat 10-12 hours before your appointment if you are coming in fasting for labs on lipids, cholesterol, or glucose (sugar). This does not apply to pregnant  women. Water, hot tea and black coffee (with nothing added) are okay. Do not drink other fluids, diet soda or chew gum.            Nov 21, 2018   Procedure with Rick Tran MD   [facility] [place], Same Day Surgery (--)    [address]  [place] [address]   [phone]            Nov 26, 2018  1:00 PM CST   Lab with  LAB   [facility] Lab ([facility])    [address] [id number] [phone]            Nov 26, 2018  1:30 PM CST   US VAS ARTERIOVENOUS MAP BILATERAL DIALYSIS ACCESS with [id number] US ([facility])    [address] [address] [phone]           How do I prepare for my exam? (Food and drink instructions) No Food and Drink Restrictions.  How do I prepare for my exam? (Other instructions) You do not need to do anything special to prepare for your exam.  What should I wear: Wear comfortable clothes.  How long does the exam take: Most ultrasounds take 30 to 60 minutes.  What should I bring: Bring a list of your medicines, including vitamins, minerals and over-the-counter drugs. It is safest to leave personal items at home.  Do I need a :  No  is needed.  What do I need to tell my doctor: Tell your doctor about any allergies you may have.  What should I do after the exam: No restrictions, You may resume normal activities.  What is this test: An ultrasound uses sound waves to make pictures of the body. Sound waves do not cause pain. The only discomfort may be the pressure of the wand against your skin or full bladder.  Who should I call with questions: If you have any questions, please call the Imaging Department where you will have your exam. Directions, parking instructions, and other information is available on our website, [email or web address].            Nov 26, 2018  2:45 PM CST   (Arrive by 2:30 PM)   Fistula Consult with Calin  MD Shravan   [facility] Solid Organ Transplant ([facility])    [address]  [address]  [facility] [id number] [phone]            Dec 03, 2018  8:00 AM CST   LAB with UINGA LAB GOLD WAITING   [facility] [place] Lab ([facility])    [address] [facility] [address]              Please do not eat 10-12 hours before your appointment if you are coming in fasting for labs on lipids, cholesterol, or glucose (sugar). This does not apply to pregnant women. Water, hot tea and black coffee (with nothing added) are okay. Do not drink other fluids, diet soda or chew gum.            Dec 03, 2018  8:30 AM CST   Ech Complete with UUECHR2   [facility] ([facility])    [address] [facility] [address] [phone]           1.  Please bring or wear a comfortable two-piece outfit. 2.  You may eat, drink and take your normal medicines. 3.  For any questions that cannot be answered, please contact the ordering physician 4.  Please do not wear perfumes or scented lotions on the day of your exam.            Dec 03, 2018  9:30 AM CST   Procedure - 2.5 hour with U2A ROOM 12   [address] [facility] ([facility])    [address] [facility] [address]                 Who to contact     Please call your clinic at [phone] to:    Ask questions about your health    Make or cancel appointments    Discuss your medicines    Learn about your test results    Speak to your doctor            Additional Information About Your Visit        Fanwardshart Information     [employer] Code gives you secure access to your electronic health record. If you see a primary care provider, you can also send messages to your care team and make appointments. If you have questions, please call your primary care clinic.  If you do not have a primary care provider, please  "call [phone] and they will assist you.      [employer] is an electronic gateway that provides easy, online access to your medical records. With [employer], you can request a clinic appointment, read your test results, renew a prescription or communicate with your care team.     To access your existing account, please contact your [facility] or call [phone] for assistance.        Care EveryWhere ID     This is your Care EveryWhere ID. This could be used by other organizations to access your [place] medical records  [id number]        Your Vitals Were     Height BMI (Body Mass Index)                1.753 m (5' 9\") 25.55 kg/m2           Blood Pressure from Last 3 Encounters:   11/05/18 132/77   11/05/18 (!) 168/94   10/31/18 125/71    Weight from Last 3 Encounters:   11/13/18 78.5 kg (173 lb)   11/05/18 79.4 kg (175 lb)   11/05/18 77 kg (169 lb 12.1 oz)              Today, you had the following     No orders found for display       Primary Care Provider Office Phone # Fax #    Yahir Alex Turcios [phone]       [address]  [facility] [address]        Goals        Lifestyle    Increase physical activity     Notes - Note created  7/3/2018  1:51 PM by Carrie Tran, RN    Goal Statement: I will start cardiac rehab  Measure of Success: starts cardiac rehab  Supportive Steps to Achieve: support of RN CC  Barriers: none  Strengths: willingness to participate   Date to Achieve By: 7-15-18          Equal Access to Services     JOHN HAUSER AH: Hadii aad ku hadasho Soomaali, waaxda luqadaha, qaybta kaalmada adeegyada, waxlisette ayala. So [facility] [phone].    ATENCIÓN: Si habla español, tiene a ortiz disposición servicios gratuitos de asistencia lingüística. Llame al [phone].    We comply with applicable federal civil rights laws and [place] laws. We do not discriminate on the basis of race, color, national origin, age, disability, sex, " sexual orientation, or gender identity.            Thank you!     Thank you for choosing [facility] E[facility]SE [facility] TH[facility]AT  for your care. Our goal is always to provide you with excellent care. Hearing back from our patients is one way we can continue to improve our services. Please take a few minutes to complete the written survey that you may receive in the mail after your visit with us. Thank you!             Your Updated Medication List - Protect others around you: Learn how to safely use, store and throw away your medicines at www.disposemymeds.org.          This list is accurate as of 11/13/18 11:59 PM.  Always use your most recent med list.                   Brand Name Dispense Instructions for use Diagnosis    albuterol 108 (90 Base) MCG/ACT inhaler    PROAIR HFA/PROVENTIL HFA/VENTOLIN HFA     Inhale 2 puffs into the lungs every 4 hours as needed for shortness of breath / dyspnea or wheezing        amLODIPine 10 MG tablet    NORVASC    90 tablet    Take 1 tablet (10 mg) by mouth daily    Hypertension goal BP (blood pressure) < 130/80       aspirin 81 MG tablet      Take 1 tablet (81 mg) by mouth at bedtime        atorvastatin 40 MG tablet    LIPITOR    90 tablet    Take 1 tablet (40 mg) by mouth daily    Heart replaced by transplant (H)       biotin 5 MG Caps    BIOTIN 5000    30 capsule    Take 5 mg by mouth daily    Brittle nails       blood glucose monitoring lancets     102 each    Use to test blood sugar 2-3 times daily or as directed.  Ok to substitute alternative if insurance prefers.    Type 2 diabetes mellitus with stage 5 chronic kidney disease not on chronic dialysis, without long-term current use of insulin (H)       blood glucose monitoring test strip    no brand specified    100 each    Use to test blood sugar 2-3 times daily or as directed.    Type 2 diabetes mellitus with stage 5 chronic kidney disease not on chronic dialysis, without long-term current use of insulin (H)       chlorhexidine 0.12 %  solution    PERIDEX    900 mL    Swish and spit 15 mLs in mouth 2 times daily    Oral ulceration       ciprofloxacin 500 MG tablet    CIPRO    14 tablet    Take 1 tablet (500 mg) by mouth every 24 hours    Oral ulceration       cloNIDine 0.1 MG tablet    CATAPRES    90 tablet    Take 1 tablet (0.1 mg) by mouth At Bedtime    Hypertension, Heart replaced by transplant (H)       clotrimazole 10 MG kalpana     120 Kalpana    Place 1 Kalpana (10 mg) inside cheek 4 times daily    Candidiasis of mouth       fluticasone 50 MCG/ACT spray    FLONASE    16 g    Spray 1-2 sprays into both nostrils daily    Nasal congestion       gentian violet 1 % solution     30 mL    Take 0.5 mLs by mouth 4 times daily    Oral ulceration       hydrALAZINE 100 MG Tabs tablet    APRESOLINE    90 tablet    Take 1 tablet (100 mg) by mouth every 8 hours    Essential hypertension       insulin aspart 100 UNIT/ML injection    NovoLOG PEN    9 mL    Inject 1-7 Units Subcutaneous 4 times daily (before meals and nightly)    Type 2 diabetes mellitus with diabetic nephropathy, with long-term current use of insulin (H)       * insulin isophane human 100 UNIT/ML injection    HumuLIN N PEN    6 mL    Inject 16 Units Subcutaneous every morning (before breakfast)    Type 2 diabetes mellitus with diabetic nephropathy, with long-term current use of insulin (H)       * insulin isophane human 100 UNIT/ML injection    HumuLIN N PEN    3 mL    Inject 12 Units Subcutaneous daily (with dinner)    Type 2 diabetes mellitus with diabetic nephropathy, with long-term current use of insulin (H)       lisinopril 5 MG tablet    PRINIVIL/ZESTRIL    90 tablet    Take 1 tablet (5 mg) by mouth daily    Hypertension goal BP (blood pressure) < 130/80       loratadine 10 MG tablet    CLARITIN     Take 10 mg by mouth daily Reported on 5/3/2017    Hypertensive cardiopathy, SOB (shortness of breath)       melatonin 1 MG Tabs tablet     120 tablet    Take 2 tablets (2 mg) by mouth nightly  as needed for sleep    Heart transplanted (H)       mycophenolate 250 MG capsule    GENERIC EQUIVALENT    180 capsule    Take 3 capsules (750 mg) by mouth 2 times daily    Heart transplanted (H)       NEPHROCAPS 1 MG capsule     120 capsule    Take 1 capsule by mouth daily        order for DME     1 Units    Equipment being ordered: Carol () Treatment Diagnosis: ESRD on PD Pt has to be connected to PD all night and can not be disconnected, hence impending his mobility to go to the bathroom. At risk for infection if he does not have this equipment.    CKD (chronic kidney disease) stage 5, GFR less than 15 ml/min (H)       pantoprazole 40 MG EC tablet    PROTONIX    60 tablet    Take 1 tablet (40 mg) by mouth 2 times daily (before meals)    Duodenitis       pentamidine 300 MG neb solution    NEBUPENT    300 mg    Inhale 300 mg into the lungs every 28 days Last given 9/19/18    Heart replaced by transplant (H)       polyethylene glycol Packet    MIRALAX/GLYCOLAX    7 packet    Take 17 g by mouth daily as needed for constipation    Constipation, unspecified constipation type       predniSONE 5 MG tablet    DELTASONE    60 tablet    Take 1 tablet (5 mg) by mouth daily    Heart transplanted (H)       rosuvastatin 20 MG tablet    CRESTOR    30 tablet    Take 1 tablet (20 mg) by mouth daily    Heart transplant recipient (H)       simethicone 80 MG chewable tablet    MYLICON    180 tablet    Take 1 tablet (80 mg) by mouth every 6 hours as needed for cramping    Flatulence, eructation and gas pain       sulfamethoxazole-trimethoprim 400-80 MG per tablet    BACTRIM/SEPTRA    14 tablet    Once per day on Tuesday, Thursday and Saturday. Take after dialysis on dialysis days.    Heart transplanted (H)       tacrolimus 1 MG capsule    GENERIC EQUIVALENT    60 capsule    Take 1 capsule (1 mg) by mouth 2 times daily    Heart transplanted (H)       traMADol 50 MG tablet    ULTRAM    10 tablet    Take 1 tablet (50 mg) by mouth  every 12 hours as needed for moderate pain    Moderate pain       triamcinolone 0.1 % ointment    KENALOG    80 g    Apply topically 2 times daily    Xerosis of skin       Urea 40 % Crea     85 g    Externally apply topically daily    Brittle nails       * Notice:  This list has 2 medication(s) that are the same as other medications prescribed for you. Read the directions carefully, and ask your doctor or other care provider to review them with you.

## 2018-11-13 NOTE — PATIENT INSTRUCTIONS
Thank you for choosing  Physicians.  Please follow up with Dr. Bañuelos in approximately in 3 weeks.    [phone] appointment scheduling option 1 and nurse advice option 3.

## 2018-11-13 NOTE — PROGRESS NOTES
HISTORY OF PRESENT ILLNESS:  Mr. Nicholson is 63 years of age.  He is here for follow-up today.  He has had a history of a strange mass on the roof of his mouth.  He has been through transplantation.  We took the mass off and it showed  food debris and necrotic material.  There is no cancer or anything else in the relatively large biopsy I took before.  He still feels a little bit of unusual nature when he palpates roof of his mouth still, but he has not had any regrowth of this type of thing like what he had that I removed last time.  It looks like it was some kind of unusual chronic ulcer infection.      PHYSICAL EXAMINATION:  The patient is alert, oriented x3 and pleasant.  Skin of the face, lips, and neck on him is quite normal.  When I look into his oral cavity, his dentition is not loose.  The anterior portion of his gums is normal, but around the area of the formation on the anterior hard palate, there still remains an area that looks cobblestoned, but this looks less dramatic than it did last time.  There is a little area where there has either been bleeding or similar on the actual palatal bone.  He has had a chronic process that seems to be stable on two sets of CAT scans on the roof of the mouth now.   They do not seem to be growing.  He is having less symptoms than he was before.  He is not having much in the way of pain.  He will brush these areas as well.      ASSESSMENT:  Patient with a history of oral cavity ulcer.  It was on the roof of his mouth and is a most usual ulcer.       PLAN:   I told him to use Mycelex troches on the top of his tongue rather than below them.  He is to come back and see me in about three weeks.  If this area does not seem to be significantly improved, I might biopsy some of the cobblestoning to look for either invasive fungus or similar.  He is not really a tobacco user.  This does not look like cancer to me, but it certainly is a most unusual oral cavity mass.      FO/ms

## 2018-11-14 ENCOUNTER — TELEPHONE (OUTPATIENT)
Dept: TRANSPLANT | Facility: CLINIC | Age: 63
End: 2018-11-14

## 2018-11-14 NOTE — TELEPHONE ENCOUNTER
Pt called with lab results.   -WBC 6.8; if stable next week, will increase MMF to 1000 mg BID per TT.   -CRP 11.6 - last week 11. Routed to ID.  -Uric aicd 6.2 - cont to follow. P tinstructed to call if he felt he was developing gout. Remains on 5 mg Pred due to positive staining on biopsy.   -A1C 5.2 - routed to [facility] to review insulin needs.  -CMV - neg, weekly check until 6 months post tx (off valcyte due to previously low WBC)     Repeat labs next week with prograf level. Enc to call with questions/concerns.   Pt verbalized understanding.

## 2018-11-16 ENCOUNTER — TELEPHONE (OUTPATIENT)
Dept: PHARMACY | Facility: CLINIC | Age: 63
End: 2018-11-16

## 2018-11-16 ENCOUNTER — RADIANT APPOINTMENT (OUTPATIENT)
Dept: CT IMAGING | Facility: CLINIC | Age: 63
End: 2018-11-16
Attending: COLON & RECTAL SURGERY
Payer: MEDICARE

## 2018-11-16 ENCOUNTER — OFFICE VISIT (OUTPATIENT)
Dept: SURGERY | Facility: CLINIC | Age: 63
End: 2018-11-16
Payer: MEDICARE

## 2018-11-16 ENCOUNTER — ANESTHESIA EVENT (OUTPATIENT)
Dept: SURGERY | Facility: CLINIC | Age: 63
End: 2018-11-16

## 2018-11-16 VITALS
SYSTOLIC BLOOD PRESSURE: 152 MMHG | TEMPERATURE: 97.8 F | HEART RATE: 99 BPM | WEIGHT: 174 LBS | DIASTOLIC BLOOD PRESSURE: 85 MMHG | HEIGHT: 69 IN | BODY MASS INDEX: 25.77 KG/M2 | OXYGEN SATURATION: 100 %

## 2018-11-16 DIAGNOSIS — Z01.818 PRE-OPERATIVE GENERAL PHYSICAL EXAMINATION: Primary | ICD-10-CM

## 2018-11-16 DIAGNOSIS — K57.32 DIVERTICULITIS OF COLON: ICD-10-CM

## 2018-11-16 RX ORDER — IOPAMIDOL 755 MG/ML
105 INJECTION, SOLUTION INTRAVASCULAR ONCE
Status: COMPLETED | OUTPATIENT
Start: 2018-11-16 | End: 2018-11-16

## 2018-11-16 RX ADMIN — IOPAMIDOL 105 ML: 755 INJECTION, SOLUTION INTRAVASCULAR at 10:54

## 2018-11-16 ASSESSMENT — LIFESTYLE VARIABLES: TOBACCO_USE: 1

## 2018-11-16 NOTE — ANESTHESIA PREPROCEDURE EVALUATION
Anesthesia Evaluation     . Pt has had prior anesthetic. Type: General    No history of anesthetic complications          ROS/MED HX    ENT/Pulmonary:     (+)sleep apnea, allergic rhinitis, tobacco use, Past use doesn't use CPAP , . .    Neurologic:  - neg neurologic ROS     Cardiovascular: Comment: NICM s/p heart transplant      (+) Dyslipidemia, hypertension----. : . . . :. . Previous cardiac testing Echodate:9/11/2018results:Interpretation Summary  Global and regional left ventricular function is hyperkinetic with an EF of  65-70%.  Right ventricle size and systolic function is normal.  No significant valve disease and there is slight decrease in LVEFdate: results:ECG reviewed date:7/26/2018 results:Sinus tachycardia  Nonspecific T wave abnormality  Abnormal ECG date: results:          METS/Exercise Tolerance:  >4 METS   Hematologic:     (+) History of blood clots pt is not anticoagulated, Anemia, -      Musculoskeletal:  - neg musculoskeletal ROS       GI/Hepatic:     (+) GERD Asymptomatic on medication,       Renal/Genitourinary: Comment: T/Th/Sa HD      (+) chronic renal disease, type: ESRD, Pt requires dialysis, type: Hemodialysis, Pt has no history of transplant,       Endo:     (+) type II DM Last HgA1c: 5.2 date: 11/13/2018 Using insulin Diabetic complications: nephropathy, Chronic steroid usage for Post Transplant Immunosuppression .      Psychiatric:  - neg psychiatric ROS       Infectious Disease: Comment: Thrush on nystatin.Oral mucosal ulcer secondary to neutropenia with Klebsiella infection.He is on ciprofloxacin 500 mg daily   - neg infectious disease ROS       Malignancy:      - no malignancy   Other:    (+) C-spine cleared: N/A, H/O Chronic Pain,H/O chronic opiod use , no other significant disability                    Physical Exam  Normal systems: cardiovascular, pulmonary and dental    Airway   Mallampati: II  TM distance: >3 FB  Neck ROM: full    Dental     Cardiovascular       Pulmonary                 PAC Discussion and Assessment    ASA Classification: 3  Case is suitable for:   Anesthetic techniques and relevant risks discussed: GA  Invasive monitoring and risk discussed:   Types:   Possibility and Risk of blood transfusion discussed:   NPO instructions given: NPO after midnight  Additional anesthetic preparation and risks discussed:   Needs early admission to pre-op area:   Other:     PAC Resident/NP Anesthesia Assessment:        Mid-Level Provider/Resident:   Date:   Time:     Attending Anesthesiologist Anesthesia Assessment:  Murray Nicholson is a  Y.o. Male who is scheduled for Flexible Sigmoidoscopy a on 10/21/2018 and Laparoscopic Assisted Colostomy Takedown with Dr. Tran on 12/11/2018.   Cardiac risk: ~6.6%, >4 METS .Orthotopic heart transplantation in June,2018.  has normal graft function.  He has not had any evidence of rejection. His biopsies have been negative.  His graft systolic function has been normal. Hypertension well controlled on hydralazine 100 mg 3 times a day, lisinopril 5 mg and Norvasc 10 mg.   Renal: End-stage renal disease.  He continues to remain on dialysis 3 times a week.  He has been tolerating it with no problems.    Diabetes: He is on NPH insulin.  He also uses NovoLog as needed with a sliding scale.  Oral ulcer with Klebsiella:  He has been evaluated by ENT.  He had a bedside debridement.  His CT scan shows no evidence of osteomyelitis at this time and point.  He is currently on empiric ciprofloxacin.  He is also on the chlorhexidine swish and swallow twice a day.  He will continue on ciprofloxacin for additional 3 weeks as per transplant ID recommendations.  SHEELA: doesn't use CPAP  VTE risk:~1.8%  PONV: 2pt (if 2 or >2 recommend antiemetic proph)  Airway: feasible on exam  Yue Alves MD  Anesthesia Resident - CA1  11/16/2018  11:53 AM    Reviewed and Signed by PAC Anesthesiologist  Anesthesiologist: ramsey  Date: 11/16/2018  Time:   Pass/Fail:  Pass  Disposition:     PAC Pharmacist Assessment:        Pharmacist:   Date:   Time:                           .

## 2018-11-16 NOTE — H&P
Pre-Operative H & P     CC:  Preoperative exam to assess for increased cardiopulmonary risk while undergoing surgery and anesthesia.    Date of Encounter: 11/16/2018  Primary Care Physician:  Yahir Turcios  Murray Nicholson is a 63 year old male who presents for pre-operative H & P in preparation for Laparoscopic Assisted Colostomy Takedown with Dr. Tran on 12/11/2018,  at [facility]. Mr Nicholson had a sigmoidectomy for infectious colitis 8/13/18, at [facility] and has an ostomy which is draining well. He is a heart transplant recipient (May 2018) on immunosuppression so at risk for infection. He has a current oral infection and is on ciprofloxacin and oral trouches and followed by ENT.    He denies any CP, MEADE, evidence of rejection of heart. He has no pain and has been able to return to activity levels of > 4 METS.    His insulin dependent diabetes and recent HGBA1c shows good control. For his end stage renal ds he does dialysis 3 x week.    History is obtained from the patient.     Past Medical History  Past Medical History:   Diagnosis Date    Heart transplant [facility]  5/2018     Chronic kidney disease, stage 5 (H)      Congestive heart failure, prior to heart transplant-      Dyslipidemia      Esophageal reflux      Hearing problem      Hypertension      Immunosuppression (H)      MGUS (monoclonal gammopathy of unknown significance)      SHEELA (obstructive sleep apnea)      Type 2 diabetes mellitus (H) on Insulin        Past Surgical History  Past Surgical History:   Procedure Laterality Date     CARDIAC SURGERY       COLONOSCOPY N/A 5/3/2018    Procedure: COLONOSCOPY;  colonoscopy ;  Surgeon: Ammon Castillo MD;  Location:  GI     ESOPHAGOSCOPY, GASTROSCOPY, DUODENOSCOPY (EGD), COMBINED N/A 5/7/2018    Procedure: COMBINED ENDOSCOPIC ULTRASOUND, ESOPHAGOSCOPY, GASTROSCOPY, DUODENOSCOPY (EGD), FINE NEEDLE ASPIRATE/BIOPSY;  Endoscopic Ultrasound with Fine Needle  Aspiration ;  Surgeon: Alon Don MD;  Location: [facility] OR     EXAM UNDER ANESTHESIA RECTUM N/A 8/12/2018    Procedure: EXAM UNDER ANESTHESIA RECTUM;  EXAM UNDER ANESTHESIA RECTUM ,COMBINED INCISION AND DRAINAGE OF RECTAL ABCESS ;  Surgeon: Rick Tran MD;  Location: [facility] OR     INCISION AND DRAINAGE RECTUM, COMBINED N/A 8/12/2018    Procedure: COMBINED INCISION AND DRAINAGE RECTUM;;  Surgeon: Rick Tran MD;  Location: [facility] OR     LAPAROSCOPIC ASSISTED SIGMOID COLECTOMY N/A 8/14/2018    Procedure: LAPAROSCOPIC ASSISTED SIGMOID COLECTOMY;  Laparoscopic Hand Assisted Takedown of Splenic Flexure, Sigmoidectomy, Small Bowel Resection, Takedown of Small Bowel to Colon Fistula;  Surgeon: Rick Tran MD;  Location: [facility] OR     LAPAROSCOPIC HERNIORRHAPHY INGUINAL BILATERAL Bilateral 7/24/2015    Procedure: LAPAROSCOPIC HERNIORRHAPHY INGUINAL BILATERAL;  Surgeon: Bobby Mcconnell MD;  Location: [facility] OR     LAPAROSCOPIC INSERTION CATHETER PERITONEAL DIALYSIS N/A 6/22/2017    Procedure: LAPAROSCOPIC INSERTION CATHETER PERITONEAL DIALYSIS;  Laparoscopic Peritoneal Dialysis Catheter Placement - Anesthesia with block;  Surgeon: Esteban Arvizu MD;  Location: [facility] OR     PICC INSERTION Left 04/22/2018    5Fr - 49cm (3cm external), Basilic vein, low SVC     REMOVE CATHETER PERITONEAL Right 1/15/2018    Procedure: REMOVE CATHETER PERITONEAL;  Open Removal of Peritoneal Dialysis Catheter ;  Surgeon: Esteban Arvizu MD;  Location: [facility] OR     TRANSPLANT HEART RECIPIENT N/A 6/14/2018    Procedure: TRANSPLANT HEART RECIPIENT;  Median Sternotomy, on-pump oxygenator, Heart Transplant;  Surgeon: Rony Caputo MD;  Location: [facility] OR       Hx of Blood transfusions/reactions: yes in setting of heart transplant     Hx of abnormal bleeding or anti-platelet use: no    Menstrual history: No LMP for male patient.    Steroid use in the last year: yes chronic prednisone for transplant     Personal  or FH with difficulty with Anesthesia:  no    Prior to Admission Medications  Current Outpatient Prescriptions   Medication Sig Dispense Refill     albuterol (PROAIR HFA/PROVENTIL HFA/VENTOLIN HFA) 108 (90 Base) MCG/ACT inhaler Inhale 2 puffs into the lungs every 4 hours as needed for shortness of breath / dyspnea or wheezing       amLODIPine (NORVASC) 10 MG tablet Take 1 tablet (10 mg) by mouth daily 90 tablet 3     ASPIRIN 81 MG OR TABS Take 1 tablet (81 mg) by mouth at bedtime       atorvastatin (LIPITOR) 40 MG tablet Take 1 tablet (40 mg) by mouth daily (Patient taking differently: Take 40 mg by mouth daily Takes in the afternoon.) 90 tablet 3     blood glucose monitoring (ACCU-CHEK FASTCLIX) lancets Use to test blood sugar 2-3 times daily or as directed.  Ok to substitute alternative if insurance prefers. 102 each 11     chlorhexidine (PERIDEX) 0.12 % solution Swish and spit 15 mLs in mouth 2 times daily 900 mL 0     ciprofloxacin (CIPRO) 500 MG tablet Take 1 tablet (500 mg) by mouth every 24 hours 14 tablet 0     cloNIDine (CATAPRES) 0.1 MG tablet Take 1 tablet (0.1 mg) by mouth At Bedtime 90 tablet 3     clotrimazole 10 MG kalpana Place 1 Kalpana (10 mg) inside cheek 4 times daily 120 Kalpana 11     fluticasone (FLONASE) 50 MCG/ACT spray Spray 1-2 sprays into both nostrils daily 16 g 11     gentian violet 1 % solution Take 0.5 mLs by mouth 4 times daily 30 mL 1     hydrALAZINE (APRESOLINE) 100 MG TABS tablet Take 1 tablet (100 mg) by mouth every 8 hours 90 tablet 11     insulin aspart (NOVOLOG PEN) 100 UNIT/ML injection Inject 1-7 Units Subcutaneous 4 times daily (before meals and nightly) 9 mL 3     insulin isophane human (HUMULIN N PEN) 100 UNIT/ML injection Inject 16 Units Subcutaneous every morning (before breakfast) 6 mL 11     insulin isophane human (HUMULIN N PEN) 100 UNIT/ML injection Inject 12 Units Subcutaneous daily (with dinner) 3 mL 11     lisinopril (PRINIVIL/ZESTRIL) 5 MG tablet Take 1 tablet (5  mg) by mouth daily 90 tablet 3     loratadine (CLARITIN) 10 MG tablet Take 10 mg by mouth daily Reported on 5/3/2017       melatonin 1 MG TABS tablet Take 2 tablets (2 mg) by mouth nightly as needed for sleep 120 tablet 1     mycophenolate (GENERIC EQUIVALENT) 250 MG capsule Take 3 capsules (750 mg) by mouth 2 times daily 180 capsule 11     NEPHROCAPS 1 MG capsule Take 1 capsule by mouth daily 120 capsule 0     order for DME Equipment being ordered: Carol ()  Treatment Diagnosis: ESRD on PD  Pt has to be connected to PD all night and can not be disconnected, hence impending his mobility to go to the bathroom. At risk for infection if he does not have this equipment. 1 Units 0     pantoprazole (PROTONIX) 40 MG EC tablet Take 1 tablet (40 mg) by mouth 2 times daily (before meals) 60 tablet 11     pentamidine (NEBUPENT) 300 MG neb solution Inhale 300 mg into the lungs every 28 days Last given 9/19/18 300 mg 0     polyethylene glycol (MIRALAX/GLYCOLAX) Packet Take 17 g by mouth daily as needed for constipation 7 packet      predniSONE (DELTASONE) 5 MG tablet Take 1 tablet (5 mg) by mouth daily 60 tablet 0     rosuvastatin (CRESTOR) 20 MG tablet Take 1 tablet (20 mg) by mouth daily 30 tablet 1     simethicone (MYLICON) 80 MG chewable tablet Take 1 tablet (80 mg) by mouth every 6 hours as needed for cramping 180 tablet      sulfamethoxazole-trimethoprim (BACTRIM/SEPTRA) 400-80 MG per tablet Once per day on Tuesday, Thursday and Saturday. Take after dialysis on dialysis days. 14 tablet 3     tacrolimus (GENERIC EQUIVALENT) 1 MG capsule Take 1 capsule (1 mg) by mouth 2 times daily 60 capsule 0     traMADol (ULTRAM) 50 MG tablet Take 1 tablet (50 mg) by mouth every 12 hours as needed for moderate pain 10 tablet 0     triamcinolone (KENALOG) 0.1 % ointment Apply topically 2 times daily 80 g 0     biotin (BIOTIN 5000) 5 MG CAPS Take 5 mg by mouth daily 30 capsule 3     blood glucose monitoring (NO BRAND SPECIFIED) test  strip Use to test blood sugar 2-3 times daily or as directed. 100 each 11     Urea 40 % CREA Externally apply topically daily 85 g 1       Allergies  Allergies   Allergen Reactions     Norco [Hydrocodone-Acetaminophen] Nausea and Vomiting     Cats      Throat tightness     Isosorbide Other (See Comments)     hypotension     Penicillins Hives     Seasonal Allergies      rhinitis     Shrimp      Throat closes        Social History  Social History     Social History     Marital status: Legally      Number of children: N/A     Social History Main Topics     Smoking status: Former Smoker     Packs/day: 1.00     Years: 19.00     Types: Cigarettes     Quit date: 1994     Smokeless tobacco: Never Used     Alcohol use No     Drug use: No     Sexual activity: Not Currently     Partners: Female     Birth control/ protection: Condom     Other Topics Concern     Parent/Sibling W/ Cabg, Mi Or Angioplasty Before 65f 55m? No     i believe my Father did     Exercise No       Family History  Family History   Problem Relation Age of Onset     C.A.D. Father       from-never knew father-age 60     Diabetes Father      Cerebrovascular Disease Father      Hypertension Father        Anesthesia Evaluation    Pt has had prior anesthetic. Type: General   No history of anesthetic complications    ROS/MED HX    ENT/Pulmonary:     (+)sleep apnea,doesn't use CPAP   allergic rhinitis,   tobacco use past use   Neurologic:  - neg neurologic ROS     Cardiovascular: Comment: NICM s/p heart transplant      (+) Dyslipidemia, hypertension--CAD p[rior to transplant  Previous cardiac testing   Echodate:2018 results:Interpretation Summary  Global and regional left ventricular function is hyperkinetic with an EF of  65-70%.  Right ventricle size and systolic function is normal.  No significant valve disease and there is slight decrease in LVEF  ECG reviewed date:2018 results:Sinus tachycardia  Nonspecific T wave abnormality        METS/Exercise Tolerance:  >4 METS   Hematologic:     (+) History of blood clots pt is not anticoagulated, Anemia     Musculoskeletal:  egative       GI/Hepatic: Comment: Recent hx BRBPR and rectal abscess  CT 8/12/18: diverticulitis w/ pericolonic abscess measuring up to 7.8cm     (+) GERD Asymptomatic on medication,    S/p colectomy with ostomy   Renal/Genitourinary: Comment: T/Th/Sa HD  Was dialyzed     (+) chronic renal disease, type: ESRD, Pt requires dialysis, type: Hemodialysis, Pt has no history of transplant,       Endo:     (+) type II DM Last HgA1c: 5.2 date: 11/13/2018 Using insulin Diabetic complications: nephropathy, Chronic steroid usage for Post Transplant Immunosuppression .      Psychiatric:  - neg psychiatric ROS       Infectious Disease: Comment: Thrush on nystatin.Oral mucosal ulcer secondary to neutropenia with Klebsiella infection.He is on ciprofloxacin 500 mg daily          Malignancy:      - no malignancy   Other:    (+) C-spine cleared: N/A, H/O Chronic Pain,H/O chronic opiod use , no other significant disability        The complete review of systems is negative other than noted in the HPI or here.          Physical Exam  Constitutional: Awake, alert, cooperative, no apparent distress, and appears stated age. Wearing a face mask which he removed  Eyes: Pupils equal, round and reactive to light, extra ocular muscles intact, sclera clear, conjunctiva normal.  HENT: Normocephalic, oral pharynx with moist mucus membranes, good dentition. No goiter appreciated. No evidence of leukoplakia or discharge/ulceration  Respiratory: Clear to auscultation bilaterally, no crackles or wheezing. Upper chest with dialysis catheter  Cardiovascular: Increased rate and regular rhythm, normal S1 and S2, and no murmur noted.  Carotids +2, no bruits. Trace LE  edema. Palpable pulses to radial  arteries.   GI: Normal bowel sounds,ostomy bag in place with brown liquid drainage, soft, non-distended, non-tender.     Lymph/Hematologic: No cervical lymphadenopathy and no supraclavicular lymphadenopathy.  Genitourinary:  deferred  Skin: Warm and dry.  No rashes at anticipated surgical site.   Musculoskeletal: Full ROM of neck. There is no redness, warmth, or swelling of the joints. Gross motor strength is normal.    Neurologic: Awake, alert, oriented to name, place and time. Cranial nerves II-XII are grossly intact. Gait is normal.   Neuropsychiatric: Calm, cooperative. Normal affect.     Labs: (personally reviewed)  Lab Results   Component Value Date    WBC 6.8 11/13/2018     Lab Results   Component Value Date    RBC 4.04 11/13/2018     Lab Results   Component Value Date    HGB 11.7 11/13/2018     Lab Results   Component Value Date    HCT 37.6 11/13/2018     Lab Results   Component Value Date    MCV 93 11/13/2018     Lab Results   Component Value Date    MCH 29.0 11/13/2018     Lab Results   Component Value Date    MCHC 31.1 11/13/2018     Lab Results   Component Value Date    RDW 19.0 11/13/2018     Lab Results   Component Value Date     11/13/2018     Last Comprehensive Metabolic Panel:  Sodium   Date Value Ref Range Status   11/13/2018 132 (L) 133 - 144 mmol/L Final     Potassium   Date Value Ref Range Status   11/13/2018 4.9 3.4 - 5.3 mmol/L Final     Chloride   Date Value Ref Range Status   11/13/2018 100 94 - 109 mmol/L Final     Carbon Dioxide   Date Value Ref Range Status   11/13/2018 23 20 - 32 mmol/L Final     Anion Gap   Date Value Ref Range Status   11/13/2018 10 3 - 14 mmol/L Final     Glucose   Date Value Ref Range Status   11/13/2018 181 (H) 70 - 99 mg/dL Final     Urea Nitrogen   Date Value Ref Range Status   11/13/2018 63 (H) 7 - 30 mg/dL Final     Creatinine   Date Value Ref Range Status   11/13/2018 7.87 (H) 0.66 - 1.25 mg/dL Final     GFR Estimate   Date Value Ref Range Status   11/13/2018 7 (L) >60 mL/min/1.7m2 Final     Comment:     Non  GFR Calc     Calcium   Date Value Ref Range  "Status   11/13/2018 9.2 8.5 - 10.1 mg/dL Final         ASSESSMENT and PLAN  Murray Nicholson is a 63 year old male scheduled to undergo Laparoscopic Assisted Colostomy Takedown with Dr. Tran on 12/11/2018,  at [facility]. Mr Nicholson had a sigmoidectomy for infectious colitis 8/13/18, at [facility].  Pre-operative considerations include:    1.) CV: Functional status independent. Exercise tolerance > 4 METS. Orthotopic heart transplantation in June,2018.  has normal graft function.  He has not had any evidence of rejection. His biopsies have been negative.  His graft systolic function has been normal. Hypertension well controlled on hydralazine 100 mg 3 times a day, lisinopril 5 mg and Norvasc 10 mg.   11% risk of major adverse cardiac event based on RCRI of 3 (insulin dependent diabetes, creatinine > 2, intraabdominal procedure).  ECHO and EKG as above   No further cardiac evaluation indicated    2.) Pulmonary: 19 year smoking history. Quit 1994. No pulmonary dx, sx or meds.  SHEELA -untreated as he does not wear his CPAP  May be at risk for hypoxia    3.) Renal: End-stage renal disease. Dialysis 3 times a week Tues/THUR/SAT.  He has been tolerating it with no problems.   Recent CMP above-no labs needed today.  Surgical team will need to arrange for dialysis in hospital     4.) Endo: Type 2 Insulin dependent Diabetes: managed on NPH insulin and NovoLog as needed with a sliding scale.  Hold short acting insulin DOS. Take 80% of long acting    5.) Current healing oral lesion, bacteria Klebsiella:  He has been evaluated by ENT.  Per anesthesia resident charbel today:  \"He had a bedside debridement.  His CT scan shows no evidence of osteomyelitis at this time and point.  He is currently on empiric ciprofloxacin.  He is also on the chlorhexidine swish and swallow twice a day.  He will continue on ciprofloxacin for additional 3 weeks as per transplant ID recommendations\".    6.) " Heme: Chronic anemia of renal ds. HGB 3 days ago at 11.7.   VTE risk is low    7.) GI: Ostomy for takedown.   PONV: 2pt (if 2 or >2 recommend antiemetic proph)    Patient was discussed with Dr Tavera.   He will ensure that oral lesion is healed in the next week or return to ENT.  Patient is optimized and is acceptable candidate for the proposed procedure.  No further diagnostic evaluation is needed.    MAYTE Lopez  Preoperative Assessment Center  [facility]  Clinic and Surgery Center  Phone: [phone]  Fax: [phone]

## 2018-11-16 NOTE — PATIENT INSTRUCTIONS
Preparing for Your Surgery      Name:  Murray Nicholson   MRN:  [id number]   :  1955   Today's Date:  2018     Arriving for surgery:  Surgery date:  18  Arrival time:  9 am    Please come to:  [facility] [address]  [address] [place]  [address]    -   parking is available in front of the hospital from 5:15 am to 8:00 pm    -  Stop at the Information Desk in the lobby    -   Inform the information person that you are here for surgery. An escort to 3C will be provided. If you would not like an escort, please proceed to 3C on the 3rd floor. [phone]     -  Bring your ID and insurance card.    What can I eat or drink?  -  You may have solid food or milk products until 8 hours prior to your surgery. (Until 3 am )  -  You may have water, apple juice, clear BLACK coffee (NO creamer or nondairy creamer), or 7up/Sprite until 2 hours prior to your surgery. (Until 9 am )    Which medicines can I take?       -  Do not bring your own medications to the hospital.        -  Follow [facility] instructions regarding Ibuprofen. If no instructions given, NO Ibuprofen the day prior to surgery.         -  Aspirin currently on hold. Last dose 11-15-18.    -  Do NOT take these medications in the morning, the day of surgery:  Lisinopril, Aspart insulin      No creams, lotions, or gels on the skin.     -  Please take these medications the morning of surgery:  Amlodipine, Peridex swish and spit; Ciprofloxacin, Flonase nasal spray, Hydralazine, Loratadine, Mycophenolate, Nephrocaps, Pantoprazole, Prednisone, Rosuvastatin, Tacrolimus,       Tramadol if needed        The morning of surgery, decrease Humulin N insulin to 13 units.    How do I prepare myself?  -  Take two showers: one the night before surgery; and one the morning of surgery.         Use Scrubcare or Hibiclens to wash from neck down.  You may use your own shampoo and conditioner. No other hair products.    -  Do NOT use lotion, powder, colognes, deodorant, or antiperspirant the day of your surgery.  -  Do NOT wear any jewelry.    Questions or Concerns:  If you have questions or concerns prior to your surgery, call [phone]. (Mon - Fri   8 am- 5:30 pm)  Questions after surgery, contact your Surgeons office.      AFTER YOUR SURGERY  Breathing exercises   Breathing exercises help you recover faster. Take deep breaths and let the air out slowly. This will:     Help you wake up after surgery.    Help prevent complications like pneumonia.  Preventing complications will help you go home sooner.   We may give you a breathing device (incentive spirometer) to encourage you to breathe deeply.   Nausea and vomiting   You may feel sick to your stomach after surgery; if so, let your nurse know.    Pain control:  After surgery, you may have pain. Our goal is to help you manage your pain. Pain medicine will help you feel comfortable enough to do activities that will help you heal.  These activities may include breathing exercises, walking and physical therapy.   To help your health care team treat your pain we will ask: 1) If you have pain  2) where it is located 3) describe your pain in your words  Methods of pain control include medications given by mouth, vein or by nerve block for some surgeries.  We may give you a pain control pump that will:  1) Deliver the medicine through a tube placed in your vein  2) Control the amount of medicine you receive  3) Allow you to push a button to deliver a dose of pain medicine  Sequential Compression Device (SCD) or Pneumo Boots:  You may need to wear SCD S on your legs or feet. These are wraps connected to a machine that pumps in air and releases it. The repeated pumping helps prevent blood clots from forming.

## 2018-11-16 NOTE — TELEPHONE ENCOUNTER
You want me to call him  - or you can?? Sounds like a good plan.     NLS      Previous Messages       ----- Message -----      From: Eduardo Lea [facility]      Sent: 11/14/2018   5:23 PM        To: Lillie Ulloa RN   Subject: RE: A1C                                           Yeah, he has been doing great! I would drop his evening NPH all together and his Morning to 20 units.     I will be seeing him again on 12/4 and can make further adjustments then.   ----- Message -----      From: Lillie Ulloa RN      Sent: 11/14/2018   1:46 PM        To: Eduardo Lea [facility]   Subject: A1C                                               Hi there!     Murray's A1C was 5.2. Maybe he could decrease his insulin a bit.  Prior to tx he was on Glipizide 5 mg every day.     He remains on 5 mg Pred due to positive staining on his biopsy.     Appreciate your thoughts!     Lillie           Spoke with Murray. He states he has been running <100 in the morning. We dropped his evening NPH because of this. During lunch he has been running in the 70-100s, but with the occasional 200. Considering majority are under control, we will reduce his morning dose to 20 units daily. I will follow up as planned on 12/3/18.    Eduardo Lea, PharmD  [facility] Pharmacist    Phone: [phone]

## 2018-11-16 NOTE — MR AVS SNAPSHOT
After Visit Summary   2018    Murray Nicholson    MRN: [id number]           Patient Information     Date Of Birth          1955        Visit Information        Provider Department      2018 11:30 AM Michelle Holbrook PA [facility]        Care Instructions    Preparing for Your Surgery      Name:  Murray Nicholson   MRN:  [id number]   :  1955   Today's Date:  2018     Arriving for surgery:  Surgery date:  18  Arrival time:  9 am    Please come to:  [facility] [address]  [address] [place]  [address]    -   parking is available in front of the hospital from 5:15 am to 8:00 pm    -  Stop at the Information Desk in the lobby    -   Inform the information person that you are here for surgery. An escort to 3C will be provided. If you would not like an escort, please proceed to 3C on the 3rd floor. [phone]     -  Bring your ID and insurance card.    What can I eat or drink?  -  You may have solid food or milk products until 8 hours prior to your surgery. (Until 3 am )  -  You may have water, apple juice, clear BLACK coffee (NO creamer or nondairy creamer), or 7up/Sprite until 2 hours prior to your surgery. (Until 9 am )    Which medicines can I take?       -  Do not bring your own medications to the hospital.        -  Follow [facility] instructions regarding Ibuprofen. If no instructions given, NO Ibuprofen the day prior to surgery.         -  Aspirin currently on hold. Last dose 11-15-18.    -  Do NOT take these medications in the morning, the day of surgery:  Lisinopril, Aspart insulin      No creams, lotions, or gels on the skin.     -  Please take these medications the morning of surgery:  Amlodipine, Peridex swish and spit; Ciprofloxacin, Flonase nasal spray, Hydralazine, Loratadine, Mycophenolate, Nephrocaps, Pantoprazole, Prednisone, Rosuvastatin, Tacrolimus,        Tramadol if needed        The morning of surgery, decrease Humulin N insulin to 13 units.    How do I prepare myself?  -  Take two showers: one the night before surgery; and one the morning of surgery.         Use Scrubcare or Hibiclens to wash from neck down.  You may use your own shampoo and conditioner. No other hair products.   -  Do NOT use lotion, powder, colognes, deodorant, or antiperspirant the day of your surgery.  -  Do NOT wear any jewelry.    Questions or Concerns:  If you have questions or concerns prior to your surgery, call [phone]. (Mon - Fri   8 am- 5:30 pm)  Questions after surgery, contact your Surgeons office.      AFTER YOUR SURGERY  Breathing exercises   Breathing exercises help you recover faster. Take deep breaths and let the air out slowly. This will:     Help you wake up after surgery.    Help prevent complications like pneumonia.  Preventing complications will help you go home sooner.   We may give you a breathing device (incentive spirometer) to encourage you to breathe deeply.   Nausea and vomiting   You may feel sick to your stomach after surgery; if so, let your nurse know.    Pain control:  After surgery, you may have pain. Our goal is to help you manage your pain. Pain medicine will help you feel comfortable enough to do activities that will help you heal.  These activities may include breathing exercises, walking and physical therapy.   To help your health care team treat your pain we will ask: 1) If you have pain  2) where it is located 3) describe your pain in your words  Methods of pain control include medications given by mouth, vein or by nerve block for some surgeries.  We may give you a pain control pump that will:  1) Deliver the medicine through a tube placed in your vein  2) Control the amount of medicine you receive  3) Allow you to push a button to deliver a dose of pain medicine  Sequential Compression Device (SCD) or Pneumo Boots:  You may need to wear SCD S on  your legs or feet. These are wraps connected to a machine that pumps in air and releases it. The repeated pumping helps prevent blood clots from forming.                     Follow-ups after your visit        Your next 10 appointments already scheduled     Nov 19, 2018  9:00 AM CST   LAB with  LAB   [facility] ([facility])    [address] [place] [id number] [phone]           Please do not eat 10-12 hours before your appointment if you are coming in fasting for labs on lipids, cholesterol, or glucose (sugar). This does not apply to pregnant women. Water, hot tea and black coffee (with nothing added) are okay. Do not drink other fluids, diet soda or chew gum.            Nov 21, 2018   Procedure with Rick Tran MD   [facility] [place], Same Day Surgery (--)    [address]  [facility] [address]   [phone]            Nov 26, 2018  1:00 PM CST   Lab with  LAB   [facility] ([facility])    [address] [place] [address] [phone]            Nov 26, 2018  1:30 PM CST   US VAS ARTERIOVENOUS MAP BILATERAL DIALYSIS ACCESS with [address] [facility] ([facility])    [address] [place] [address] [phone]           How do I prepare for my exam? (Food and drink instructions) No Food and Drink Restrictions.  How do I prepare for my exam? (Other instructions) You do not need to do anything special to prepare for your exam.  What should I wear: Wear comfortable clothes.  How long does the exam take: Most ultrasounds take 30 to 60 minutes.  What should I bring: Bring a list of your medicines, including vitamins, minerals and over-the-counter drugs. It is safest to leave personal items at home.  Do I need a :  No  is needed.  What do I need to tell my doctor: Tell your doctor about any allergies you may have.  What  should I do after the exam: No restrictions, You may resume normal activities.  What is this test: An ultrasound uses sound waves to make pictures of the body. Sound waves do not cause pain. The only discomfort may be the pressure of the wand against your skin or full bladder.  Who should I call with questions: If you have any questions, please call the Imaging Department where you will have your exam. Directions, parking instructions, and other information is available on our website, [email or web address].            Nov 26, 2018  2:45 PM CST   (Arrive by 2:30 PM)   Fistula Consult with Calin Cheney MD   [facility] Solid Organ Transplant ([facility] [place])    [address]  [address]  [facility] [address] [phone]            Dec 03, 2018  8:00 AM CST   LAB with [facility] LAB GOLD WAITING   [facility], Lab ([facility])    [address] [address]              Please do not eat 10-12 hours before your appointment if you are coming in fasting for labs on lipids, cholesterol, or glucose (sugar). This does not apply to pregnant women. Water, hot tea and black coffee (with nothing added) are okay. Do not drink other fluids, diet soda or chew gum.            Dec 03, 2018  8:30 AM CST   Ech Complete with OBDULIA   [facility],  Echocardiography ([facility])    [address] [facility] [facility] [address] [phone]           1.  Please bring or wear a comfortable two-piece outfit. 2.  You may eat, drink and take your normal medicines. 3.  For any questions that cannot be answered, please contact the ordering physician 4.  Please do not wear perfumes or scented lotions on the day of your exam.            Dec 03, 2018  9:30 AM CST   Procedure - 2.5 hour with U2A ROOM 12   [address] [facility] ([facility])    [address]  [address]               Dec 03, 2018 10:30 AM CST   Cath 90 Minute with [id number]   [facility] [place],  Heart Cath Lab ([facility])    [address] [address]   [phone]            Dec 03, 2018  1:00 PM CST   (Arrive by 12:45 PM)   Return Visit with Rick Tran MD   [facility] Colon and Rectal Surgery ([facility] [place])    [address]  [address] [facility] [id number] [phone]              Who to contact     Please call your clinic at [phone] to:    Ask questions about your health    Make or cancel appointments    Discuss your medicines    Learn about your test results    Speak to your doctor            Additional Information About Your Visit        [employer] Information     [employer] gives you secure access to your electronic health record. If you see a primary care provider, you can also send messages to your care team and make appointments. If you have questions, please call your primary care clinic.  If you do not have a primary care provider, please call [phone] and they will assist you.      [employer] is an electronic gateway that provides easy, online access to your medical records. With [employer], you can request a clinic appointment, read your test results, renew a prescription or communicate with your care team.     To access your existing account, please contact your [facility] or call [phone] for assistance.        Care EveryWhere ID     This is your Care EveryWhere ID. This could be used by other organizations to access your [place] medical records  [id number]         Blood Pressure from Last 3 Encounters:   11/05/18 132/77   11/05/18 (!) 168/94   10/31/18 125/71    Weight from Last 3 Encounters:   11/13/18 78.5 kg (173 lb)   11/05/18 79.4 kg (175 lb)   11/05/18 77 kg (169 lb 12.1 oz)              Today, you had the following     No orders found for display          Today's Medication Changes          These changes are accurate as of 11/16/18  1:06 PM.  If you have any questions, ask your nurse or doctor.               These medicines have changed or have updated prescriptions.        Dose/Directions    atorvastatin 40 MG tablet   Commonly known as:  LIPITOR   This may have changed:  additional instructions   Used for:  Heart replaced by transplant (H)        Dose:  40 mg   Take 1 tablet (40 mg) by mouth daily   Quantity:  90 tablet   Refills:  3                Primary Care Provider Office Phone # Fax #    Yahir Alex Turcios [phone]       [address]  [facility] [address]        Goals        Lifestyle    Increase physical activity     Notes - Note created  7/3/2018  1:51 PM by Carrie Tran, RN    Goal Statement: I will start cardiac rehab  Measure of Success: starts cardiac rehab  Supportive Steps to Achieve: support of RN CC  Barriers: none  Strengths: willingness to participate   Date to Achieve By: 7-15-18          Equal Access to Services     JOHN HAUSER AH: Hadii aad ku hadasho Soomaali, waaxda luqadaha, qaybta kaalmada adeegyada, waxay idiin hayhankn gavino stallworthn . So [facility] [phone].    ATENCIÓN: Si habla español, tiene a ortiz disposición servicios gratuitos de asistencia lingüística. Keely al [phone].    We comply with applicable federal civil rights laws and [place] laws. We do not discriminate on the basis of race, color, national origin, age, disability, sex, sexual orientation, or gender identity.            Thank you!     Thank you for choosing [facility]  for your care. Our goal is always to provide you with excellent care. Hearing back from our patients is one way we can continue to improve our services. Please take a few minutes to complete the written survey that you may receive in the mail after your visit with us. Thank you!             Your Updated Medication List - Protect others  around you: Learn how to safely use, store and throw away your medicines at www.disposemymeds.org.          This list is accurate as of 11/16/18  1:06 PM.  Always use your most recent med list.                   Brand Name Dispense Instructions for use Diagnosis    albuterol 108 (90 Base) MCG/ACT inhaler    PROAIR HFA/PROVENTIL HFA/VENTOLIN HFA     Inhale 2 puffs into the lungs every 4 hours as needed for shortness of breath / dyspnea or wheezing        amLODIPine 10 MG tablet    NORVASC    90 tablet    Take 1 tablet (10 mg) by mouth daily    Hypertension goal BP (blood pressure) < 130/80       aspirin 81 MG tablet      Take 1 tablet (81 mg) by mouth at bedtime        atorvastatin 40 MG tablet    LIPITOR    90 tablet    Take 1 tablet (40 mg) by mouth daily    Heart replaced by transplant (H)       biotin 5 MG Caps    BIOTIN 5000    30 capsule    Take 5 mg by mouth daily    Brittle nails       blood glucose monitoring lancets     102 each    Use to test blood sugar 2-3 times daily or as directed.  Ok to substitute alternative if insurance prefers.    Type 2 diabetes mellitus with stage 5 chronic kidney disease not on chronic dialysis, without long-term current use of insulin (H)       blood glucose monitoring test strip    no brand specified    100 each    Use to test blood sugar 2-3 times daily or as directed.    Type 2 diabetes mellitus with stage 5 chronic kidney disease not on chronic dialysis, without long-term current use of insulin (H)       chlorhexidine 0.12 % solution    PERIDEX    900 mL    Swish and spit 15 mLs in mouth 2 times daily    Oral ulceration       ciprofloxacin 500 MG tablet    CIPRO    14 tablet    Take 1 tablet (500 mg) by mouth every 24 hours    Oral ulceration       cloNIDine 0.1 MG tablet    CATAPRES    90 tablet    Take 1 tablet (0.1 mg) by mouth At Bedtime    Hypertension, Heart replaced by transplant (H)       clotrimazole 10 MG kalpana     120 Kalpana    Place 1 Kalpana (10 mg) inside  cheek 4 times daily    Candidiasis of mouth       fluticasone 50 MCG/ACT spray    FLONASE    16 g    Spray 1-2 sprays into both nostrils daily    Nasal congestion       gentian violet 1 % solution     30 mL    Take 0.5 mLs by mouth 4 times daily    Oral ulceration       hydrALAZINE 100 MG Tabs tablet    APRESOLINE    90 tablet    Take 1 tablet (100 mg) by mouth every 8 hours    Essential hypertension       insulin aspart 100 UNIT/ML injection    NovoLOG PEN    9 mL    Inject 1-7 Units Subcutaneous 4 times daily (before meals and nightly)    Type 2 diabetes mellitus with diabetic nephropathy, with long-term current use of insulin (H)       * insulin isophane human 100 UNIT/ML injection    HumuLIN N PEN    6 mL    Inject 16 Units Subcutaneous every morning (before breakfast)    Type 2 diabetes mellitus with diabetic nephropathy, with long-term current use of insulin (H)       * insulin isophane human 100 UNIT/ML injection    HumuLIN N PEN    3 mL    Inject 12 Units Subcutaneous daily (with dinner)    Type 2 diabetes mellitus with diabetic nephropathy, with long-term current use of insulin (H)       lisinopril 5 MG tablet    PRINIVIL/ZESTRIL    90 tablet    Take 1 tablet (5 mg) by mouth daily    Hypertension goal BP (blood pressure) < 130/80       loratadine 10 MG tablet    CLARITIN     Take 10 mg by mouth daily Reported on 5/3/2017    Hypertensive cardiopathy, SOB (shortness of breath)       melatonin 1 MG Tabs tablet     120 tablet    Take 2 tablets (2 mg) by mouth nightly as needed for sleep    Heart transplanted (H)       mycophenolate 250 MG capsule    GENERIC EQUIVALENT    180 capsule    Take 3 capsules (750 mg) by mouth 2 times daily    Heart transplanted (H)       NEPHROCAPS 1 MG capsule     120 capsule    Take 1 capsule by mouth daily        order for DME     1 Units    Equipment being ordered: Commode () Treatment Diagnosis: ESRD on PD Pt has to be connected to PD all night and can not be disconnected,  hence impending his mobility to go to the bathroom. At risk for infection if he does not have this equipment.    CKD (chronic kidney disease) stage 5, GFR less than 15 ml/min (H)       pantoprazole 40 MG EC tablet    PROTONIX    60 tablet    Take 1 tablet (40 mg) by mouth 2 times daily (before meals)    Duodenitis       pentamidine 300 MG neb solution    NEBUPENT    300 mg    Inhale 300 mg into the lungs every 28 days Last given 9/19/18    Heart replaced by transplant (H)       polyethylene glycol Packet    MIRALAX/GLYCOLAX    7 packet    Take 17 g by mouth daily as needed for constipation    Constipation, unspecified constipation type       predniSONE 5 MG tablet    DELTASONE    60 tablet    Take 1 tablet (5 mg) by mouth daily    Heart transplanted (H)       rosuvastatin 20 MG tablet    CRESTOR    30 tablet    Take 1 tablet (20 mg) by mouth daily    Heart transplant recipient (H)       simethicone 80 MG chewable tablet    MYLICON    180 tablet    Take 1 tablet (80 mg) by mouth every 6 hours as needed for cramping    Flatulence, eructation and gas pain       sulfamethoxazole-trimethoprim 400-80 MG per tablet    BACTRIM/SEPTRA    14 tablet    Once per day on Tuesday, Thursday and Saturday. Take after dialysis on dialysis days.    Heart transplanted (H)       tacrolimus 1 MG capsule    GENERIC EQUIVALENT    60 capsule    Take 1 capsule (1 mg) by mouth 2 times daily    Heart transplanted (H)       traMADol 50 MG tablet    ULTRAM    10 tablet    Take 1 tablet (50 mg) by mouth every 12 hours as needed for moderate pain    Moderate pain       triamcinolone 0.1 % ointment    KENALOG    80 g    Apply topically 2 times daily    Xerosis of skin       Urea 40 % Crea     85 g    Externally apply topically daily    Brittle nails       * Notice:  This list has 2 medication(s) that are the same as other medications prescribed for you. Read the directions carefully, and ask your doctor or other care provider to review them with  you.

## 2018-11-16 NOTE — DISCHARGE INSTRUCTIONS
What to expect when you have contrast    During your exam, we will inject  contrast  into your vein or artery. (Contrast is a clear liquid with iodine in it. It shows up on X-rays.)    You may feel warm or hot. You may have a metal taste in your mouth and a slight upset stomach. You may also feel pressure near the kidneys and bladder. These effects will last about 1 to 3 minutes.    Please tell us if you have:    Sneezing     Itching    Hives     Swelling in the face    A hoarse voice    Breathing problems    Other new symptoms    Serious problems are rare.  They may include:    Irregular heartbeat     Seizures    Kidney failure              Tissue damage    Shock      Death    If you have any problems during the exam, we  will treat them right away.    When you get home    Call your hospital if you have any new symptoms in the next 2 days, like hives or swelling. (Phone numbers are at the bottom of this page.) Or call your family doctor.     If you have wheezing or trouble breathing, call 911.    Self-care  -Drink at least 4 extra glasses of water today.   This reduces the stress on your kidneys.  -Keep taking your regular medicines.    The contrast will pass out of your body in your  Urine(pee). This will happen in the next 24 hours. You  will not feel this. Your urine will not  change color.    If you have kidney problems or take metformin    Drink 4 to 8 large glasses of water for the next  2 days, if you are not on a fluid restriction.    ?If you take metformin (Glucophage or Glucovance) for diabetes, keep taking it.      ?Your kidney function tests are abnormal.  If you take Metformin, do not take it for 48 hours. Please go to your clinic for a blood test within 3 days after your exam before the restarting this medicine.     (Note to provider:please give patient prescription for lab tests.)    ?Special instructions: ***    I have read and understand the above information.    Patient Sign  Here:______________________________________Date:________Time:______    Staff Sign Here:________________________________________Date:_______Time:______      Radiology Departments:     ?[facility]: [phone] ?[place]: [phone]     ?[place]: [phone] ?[place]:[phone]      ?[place]: [phone]  ?[place]: [phone]  ?[place]:[phone]    ?[facility]:[phone]  ?[facility]:[phone]

## 2018-11-19 DIAGNOSIS — Z94.1 HEART TRANSPLANTED (H): ICD-10-CM

## 2018-11-19 DIAGNOSIS — Z94.1 HEART REPLACED BY TRANSPLANT (H): ICD-10-CM

## 2018-11-19 LAB
ANION GAP SERPL CALCULATED.3IONS-SCNC: 9 MMOL/L (ref 3–14)
BUN SERPL-MCNC: 42 MG/DL (ref 7–30)
CALCIUM SERPL-MCNC: 9.4 MG/DL (ref 8.5–10.1)
CHLORIDE SERPL-SCNC: 100 MMOL/L (ref 94–109)
CO2 SERPL-SCNC: 25 MMOL/L (ref 20–32)
CREAT SERPL-MCNC: 6.44 MG/DL (ref 0.66–1.25)
CRP SERPL-MCNC: 5.3 MG/L (ref 0–8)
DONOR IDENTIFICATION: NORMAL
DSA COMMENTS: NORMAL
DSA PRESENT: NO
DSA TEST METHOD: NORMAL
ERYTHROCYTE [DISTWIDTH] IN BLOOD BY AUTOMATED COUNT: 18.5 % (ref 10–15)
GFR SERPL CREATININE-BSD FRML MDRD: 9 ML/MIN/1.7M2
GLUCOSE SERPL-MCNC: 135 MG/DL (ref 70–99)
HCT VFR BLD AUTO: 37.4 % (ref 40–53)
HGB BLD-MCNC: 11.6 G/DL (ref 13.3–17.7)
MCH RBC QN AUTO: 29 PG (ref 26.5–33)
MCHC RBC AUTO-ENTMCNC: 31 G/DL (ref 31.5–36.5)
MCV RBC AUTO: 94 FL (ref 78–100)
ORGAN: NORMAL
PLATELET # BLD AUTO: 289 10E9/L (ref 150–450)
POTASSIUM SERPL-SCNC: 5 MMOL/L (ref 3.4–5.3)
PROTOCOL CUTOFF: NORMAL
RBC # BLD AUTO: 4 10E12/L (ref 4.4–5.9)
SA1 CELL: NORMAL
SA1 COMMENTS: NORMAL
SA1 HI RISK ABY: NORMAL
SA1 MOD RISK ABY: NORMAL
SA1 TEST METHOD: NORMAL
SA2 CELL: NORMAL
SA2 COMMENTS: NORMAL
SA2 HI RISK ABY UA: NORMAL
SA2 MOD RISK ABY: NORMAL
SA2 TEST METHOD: NORMAL
SODIUM SERPL-SCNC: 134 MMOL/L (ref 133–144)
UNACCEPTABLE ANTIGEN: NORMAL
UNOS CPRA: 0
WBC # BLD AUTO: 5.9 10E9/L (ref 4–11)

## 2018-11-20 ENCOUNTER — TELEPHONE (OUTPATIENT)
Dept: TRANSPLANT | Facility: CLINIC | Age: 63
End: 2018-11-20

## 2018-11-20 ENCOUNTER — PATIENT OUTREACH (OUTPATIENT)
Dept: SURGERY | Facility: CLINIC | Age: 63
End: 2018-11-20

## 2018-11-20 DIAGNOSIS — Z94.1 HEART TRANSPLANTED (H): ICD-10-CM

## 2018-11-20 RX ORDER — MYCOPHENOLATE MOFETIL 250 MG/1
1000 CAPSULE ORAL 2 TIMES DAILY
Qty: 240 CAPSULE | Refills: 11 | Status: SHIPPED | OUTPATIENT
Start: 2018-11-20 | End: 2019-02-18

## 2018-11-20 NOTE — PROGRESS NOTES
[facility]:  Care Coordination Note     SITUATION   Murray Nicholson is a 63 year old male who is receiving support for:  Clinic Care Coordination - Follow-up  .    BACKGROUND     Surgery scheduled for 12/11    ASSESSMENT     Surgery              General Surgery & Colorectal Assessment  Surgeon: Dr. Tran  Surgery located at: [facility] OR  Admission status: Same day surgery  Procedure: Examination Under Anesthesia, Flexible Sigmoidoscopy  Anesthesia: Other (MAC)  Patient given soap: Yes  Patient given bowel prep: Yes  Bowel prep type: Other (Fleet enemas)  Preop consult with anesthesia completed: Yes  PAC?: Yes     Medication adherence problem (GOAL):: No      PLAN     Goals        Lifestyle    Increase physical activity     Notes - Note created  7/3/2018  1:51 PM by Carrie Tran RN    Goal Statement: I will start cardiac rehab  Measure of Success: starts cardiac rehab  Supportive Steps to Achieve: support of RN CC  Barriers: none  Strengths: willingness to participate   Date to Achieve By: 7-15-18               Nursing Interventions:       Follow-up plan:  Continue with surgery as scheduled.        Sunita Dobbins

## 2018-11-20 NOTE — TELEPHONE ENCOUNTER
Pt called with lab results. WBC 5.9; per plan of care - MMF increased to 1000 mg BID.  Cont weekly labs until 6 months post tx.     Reports mouth is feeling better.

## 2018-11-21 ENCOUNTER — SURGERY (OUTPATIENT)
Age: 63
End: 2018-11-21

## 2018-11-21 ENCOUNTER — ANESTHESIA EVENT (OUTPATIENT)
Dept: SURGERY | Facility: CLINIC | Age: 63
End: 2018-11-21
Payer: MEDICARE

## 2018-11-21 ENCOUNTER — ANESTHESIA (OUTPATIENT)
Dept: SURGERY | Facility: CLINIC | Age: 63
End: 2018-11-21
Payer: MEDICARE

## 2018-11-21 ENCOUNTER — HOSPITAL ENCOUNTER (OUTPATIENT)
Facility: CLINIC | Age: 63
Discharge: HOME | End: 2018-11-21
Attending: COLON & RECTAL SURGERY | Admitting: COLON & RECTAL SURGERY
Payer: MEDICARE

## 2018-11-21 VITALS
HEART RATE: 105 BPM | TEMPERATURE: 97.8 F | WEIGHT: 169.75 LBS | RESPIRATION RATE: 16 BRPM | BODY MASS INDEX: 25.14 KG/M2 | OXYGEN SATURATION: 98 % | DIASTOLIC BLOOD PRESSURE: 88 MMHG | HEIGHT: 69 IN | SYSTOLIC BLOOD PRESSURE: 148 MMHG

## 2018-11-21 LAB
GLUCOSE BLDC GLUCOMTR-MCNC: 103 MG/DL (ref 70–99)
GLUCOSE BLDC GLUCOMTR-MCNC: 97 MG/DL (ref 70–99)

## 2018-11-21 PROCEDURE — 71000027 ZZH RECOVERY PHASE 2 EACH 15 MINS: Performed by: COLON & RECTAL SURGERY

## 2018-11-21 PROCEDURE — 25000128 H RX IP 250 OP 636: Performed by: COLON & RECTAL SURGERY

## 2018-11-21 PROCEDURE — 36000053 ZZH SURGERY LEVEL 2 EA 15 ADDTL MIN - UMMC: Performed by: COLON & RECTAL SURGERY

## 2018-11-21 PROCEDURE — 82962 GLUCOSE BLOOD TEST: CPT | Mod: 91

## 2018-11-21 PROCEDURE — 25000125 ZZHC RX 250: Performed by: NURSE ANESTHETIST, CERTIFIED REGISTERED

## 2018-11-21 PROCEDURE — 88305 TISSUE EXAM BY PATHOLOGIST: CPT | Performed by: COLON & RECTAL SURGERY

## 2018-11-21 PROCEDURE — 40000170 ZZH STATISTIC PRE-PROCEDURE ASSESSMENT II: Performed by: COLON & RECTAL SURGERY

## 2018-11-21 PROCEDURE — 25000132 ZZH RX MED GY IP 250 OP 250 PS 637: Performed by: COLON & RECTAL SURGERY

## 2018-11-21 PROCEDURE — 37000009 ZZH ANESTHESIA TECHNICAL FEE, EACH ADDTL 15 MIN: Performed by: COLON & RECTAL SURGERY

## 2018-11-21 PROCEDURE — A9270 NON-COVERED ITEM OR SERVICE: HCPCS | Performed by: COLON & RECTAL SURGERY

## 2018-11-21 PROCEDURE — 36000051 ZZH SURGERY LEVEL 2 1ST 30 MIN - UMMC: Performed by: COLON & RECTAL SURGERY

## 2018-11-21 PROCEDURE — 37000008 ZZH ANESTHESIA TECHNICAL FEE, 1ST 30 MIN: Performed by: COLON & RECTAL SURGERY

## 2018-11-21 PROCEDURE — 25000128 H RX IP 250 OP 636: Performed by: NURSE ANESTHETIST, CERTIFIED REGISTERED

## 2018-11-21 PROCEDURE — 27210794 ZZH OR GENERAL SUPPLY STERILE: Performed by: COLON & RECTAL SURGERY

## 2018-11-21 RX ORDER — FENTANYL CITRATE 50 UG/ML
25-50 INJECTION, SOLUTION INTRAMUSCULAR; INTRAVENOUS
Status: DISCONTINUED | OUTPATIENT
Start: 2018-11-21 | End: 2018-11-21 | Stop reason: HOSPADM

## 2018-11-21 RX ORDER — LIDOCAINE HYDROCHLORIDE 20 MG/ML
INJECTION, SOLUTION INFILTRATION; PERINEURAL PRN
Status: DISCONTINUED | OUTPATIENT
Start: 2018-11-21 | End: 2018-11-21

## 2018-11-21 RX ORDER — NALOXONE HYDROCHLORIDE 0.4 MG/ML
.1-.4 INJECTION, SOLUTION INTRAMUSCULAR; INTRAVENOUS; SUBCUTANEOUS
Status: DISCONTINUED | OUTPATIENT
Start: 2018-11-21 | End: 2018-11-21 | Stop reason: HOSPADM

## 2018-11-21 RX ORDER — SODIUM CHLORIDE 9 MG/ML
INJECTION, SOLUTION INTRAVENOUS CONTINUOUS PRN
Status: DISCONTINUED | OUTPATIENT
Start: 2018-11-21 | End: 2018-11-21

## 2018-11-21 RX ORDER — HYDROMORPHONE HYDROCHLORIDE 1 MG/ML
.3-.5 INJECTION, SOLUTION INTRAMUSCULAR; INTRAVENOUS; SUBCUTANEOUS EVERY 10 MIN PRN
Status: DISCONTINUED | OUTPATIENT
Start: 2018-11-21 | End: 2018-11-21 | Stop reason: HOSPADM

## 2018-11-21 RX ORDER — PROPOFOL 10 MG/ML
INJECTION, EMULSION INTRAVENOUS CONTINUOUS PRN
Status: DISCONTINUED | OUTPATIENT
Start: 2018-11-21 | End: 2018-11-21

## 2018-11-21 RX ORDER — ONDANSETRON 4 MG/1
4 TABLET, ORALLY DISINTEGRATING ORAL
Status: DISCONTINUED | OUTPATIENT
Start: 2018-11-21 | End: 2018-11-21 | Stop reason: HOSPADM

## 2018-11-21 RX ORDER — MEPERIDINE HYDROCHLORIDE 25 MG/ML
12.5 INJECTION INTRAMUSCULAR; INTRAVENOUS; SUBCUTANEOUS
Status: DISCONTINUED | OUTPATIENT
Start: 2018-11-21 | End: 2018-11-21 | Stop reason: HOSPADM

## 2018-11-21 RX ORDER — HYDRALAZINE HYDROCHLORIDE 20 MG/ML
2.5-5 INJECTION INTRAMUSCULAR; INTRAVENOUS EVERY 10 MIN PRN
Status: DISCONTINUED | OUTPATIENT
Start: 2018-11-21 | End: 2018-11-21 | Stop reason: HOSPADM

## 2018-11-21 RX ORDER — ONDANSETRON 4 MG/1
4 TABLET, ORALLY DISINTEGRATING ORAL EVERY 30 MIN PRN
Status: DISCONTINUED | OUTPATIENT
Start: 2018-11-21 | End: 2018-11-21 | Stop reason: HOSPADM

## 2018-11-21 RX ORDER — SODIUM CHLORIDE, SODIUM LACTATE, POTASSIUM CHLORIDE, CALCIUM CHLORIDE 600; 310; 30; 20 MG/100ML; MG/100ML; MG/100ML; MG/100ML
INJECTION, SOLUTION INTRAVENOUS CONTINUOUS
Status: DISCONTINUED | OUTPATIENT
Start: 2018-11-21 | End: 2018-11-21 | Stop reason: HOSPADM

## 2018-11-21 RX ORDER — FENTANYL CITRATE 50 UG/ML
INJECTION, SOLUTION INTRAMUSCULAR; INTRAVENOUS PRN
Status: DISCONTINUED | OUTPATIENT
Start: 2018-11-21 | End: 2018-11-21

## 2018-11-21 RX ORDER — BUPIVACAINE HYDROCHLORIDE 2.5 MG/ML
INJECTION, SOLUTION INFILTRATION; PERINEURAL PRN
Status: DISCONTINUED | OUTPATIENT
Start: 2018-11-21 | End: 2018-11-21 | Stop reason: HOSPADM

## 2018-11-21 RX ORDER — ACETAMINOPHEN 325 MG/1
975 TABLET ORAL ONCE
Status: COMPLETED | OUTPATIENT
Start: 2018-11-21 | End: 2018-11-21

## 2018-11-21 RX ORDER — ONDANSETRON 2 MG/ML
INJECTION INTRAMUSCULAR; INTRAVENOUS PRN
Status: DISCONTINUED | OUTPATIENT
Start: 2018-11-21 | End: 2018-11-21

## 2018-11-21 RX ORDER — LABETALOL HYDROCHLORIDE 5 MG/ML
10 INJECTION, SOLUTION INTRAVENOUS
Status: DISCONTINUED | OUTPATIENT
Start: 2018-11-21 | End: 2018-11-21 | Stop reason: HOSPADM

## 2018-11-21 RX ORDER — ONDANSETRON 2 MG/ML
4 INJECTION INTRAMUSCULAR; INTRAVENOUS EVERY 30 MIN PRN
Status: DISCONTINUED | OUTPATIENT
Start: 2018-11-21 | End: 2018-11-21 | Stop reason: HOSPADM

## 2018-11-21 RX ADMIN — LIDOCAINE HYDROCHLORIDE 30 MG: 20 INJECTION, SOLUTION INFILTRATION; PERINEURAL at 11:39

## 2018-11-21 RX ADMIN — BUPIVACAINE HYDROCHLORIDE 30 ML: 2.5 INJECTION, SOLUTION INFILTRATION; PERINEURAL at 12:29

## 2018-11-21 RX ADMIN — ONDANSETRON 4 MG: 2 INJECTION INTRAMUSCULAR; INTRAVENOUS at 11:34

## 2018-11-21 RX ADMIN — PROPOFOL 75 MCG/KG/MIN: 10 INJECTION, EMULSION INTRAVENOUS at 11:39

## 2018-11-21 RX ADMIN — SODIUM CHLORIDE: 9 INJECTION, SOLUTION INTRAVENOUS at 11:25

## 2018-11-21 RX ADMIN — FENTANYL CITRATE 50 MCG: 50 INJECTION, SOLUTION INTRAMUSCULAR; INTRAVENOUS at 11:35

## 2018-11-21 RX ADMIN — METRONIDAZOLE 500 MG: 500 INJECTION, SOLUTION INTRAVENOUS at 11:40

## 2018-11-21 RX ADMIN — ACETAMINOPHEN 975 MG: 325 TABLET, FILM COATED ORAL at 11:25

## 2018-11-21 NOTE — ANESTHESIA PREPROCEDURE EVALUATION
Anesthesia Pre-Procedure Evaluation    Patient: Murray Nicholson   MRN:     [id number] Gender:   male   Age:    63 year old :      1955        Preoperative Diagnosis: Diverticulitis Of Colon    Procedure(s):  Examination Under Anesthesia, Flexible Sigmoidoscopy and Polypectomy     Past Medical History:   Diagnosis Date     (HFpEF) heart failure with preserved ejection fraction (H)      Allergic rhinitis, cause unspecified      Anemia of chronic kidney failure      AS (aortic stenosis)      Ascending aortic aneurysm (H)      Bicuspid aortic valve      CAD (coronary artery disease)      Chronic kidney disease, stage 5 (H)      Congestive heart failure, unspecified      Dialysis patient (H)     Tues-Thur-Sat     Dyslipidemia      Esophageal reflux      ESRD (end stage renal disease) (H)      Hearing problem      Hypersomnia with sleep apnea, unspecified      Hypertension      Immunosuppression (H)      Kidney problem      MGUS (monoclonal gammopathy of unknown significance)      Mitral regurgitation      SHEELA (obstructive sleep apnea)     No CPAP     Pneumonia      Systolic heart failure (H)      Type 2 diabetes mellitus (H)       Past Surgical History:   Procedure Laterality Date     CARDIAC SURGERY       COLONOSCOPY N/A 5/3/2018    Procedure: COLONOSCOPY;  colonoscopy ;  Surgeon: Ammon Castillo MD;  Location: [facility]     ESOPHAGOSCOPY, GASTROSCOPY, DUODENOSCOPY (EGD), COMBINED N/A 2018    Procedure: COMBINED ENDOSCOPIC ULTRASOUND, ESOPHAGOSCOPY, GASTROSCOPY, DUODENOSCOPY (EGD), FINE NEEDLE ASPIRATE/BIOPSY;  Endoscopic Ultrasound with Fine Needle Aspiration ;  Surgeon: Alon Don MD;  Location: [facility]     EXAM UNDER ANESTHESIA RECTUM N/A 2018    Procedure: EXAM UNDER ANESTHESIA RECTUM;  EXAM UNDER ANESTHESIA RECTUM ,COMBINED INCISION AND DRAINAGE OF RECTAL ABCESS ;  Surgeon: Rick Tran MD;  Location: [facility]     INCISION AND DRAINAGE RECTUM, COMBINED N/A 2018    Procedure:  COMBINED INCISION AND DRAINAGE RECTUM;;  Surgeon: Rick Tran MD;  Location: [facility]     LAPAROSCOPIC ASSISTED SIGMOID COLECTOMY N/A 8/14/2018    Procedure: LAPAROSCOPIC ASSISTED SIGMOID COLECTOMY;  Laparoscopic Hand Assisted Takedown of Splenic Flexure, Sigmoidectomy, Small Bowel Resection, Takedown of Small Bowel to Colon Fistula;  Surgeon: Rick Tran MD;  Location: [facility]     LAPAROSCOPIC HERNIORRHAPHY INGUINAL BILATERAL Bilateral 7/24/2015    Procedure: LAPAROSCOPIC HERNIORRHAPHY INGUINAL BILATERAL;  Surgeon: Bobby Mcconnell MD;  Location: [facility]     LAPAROSCOPIC INSERTION CATHETER PERITONEAL DIALYSIS N/A 6/22/2017    Procedure: LAPAROSCOPIC INSERTION CATHETER PERITONEAL DIALYSIS;  Laparoscopic Peritoneal Dialysis Catheter Placement - Anesthesia with block;  Surgeon: Esteban Arvizu MD;  Location: [facility]     PICC INSERTION Left 04/22/2018    5Fr - 49cm (3cm external), Basilic vein, low SVC     REMOVE CATHETER PERITONEAL Right 1/15/2018    Procedure: REMOVE CATHETER PERITONEAL;  Open Removal of Peritoneal Dialysis Catheter ;  Surgeon: Esteban Arvizu MD;  Location: [facility]     TRANSPLANT HEART RECIPIENT N/A 6/14/2018    Procedure: TRANSPLANT HEART RECIPIENT;  Median Sternotomy, on-pump oxygenator, Heart Transplant;  Surgeon: Rony Caputo MD;  Location: [facility]                   PHYSICAL EXAM:   Mental Status/Neuro: A/A/O   Airway:   Mallampati: II  Mouth/Opening: Full  TM distance: > 6 cm  Neck ROM: Full   Respiratory:    CV:    Comments:      Dental: Normal                  Lab Results   Component Value Date    WBC 5.9 11/19/2018    HGB 11.6 (L) 11/19/2018    HCT 37.4 (L) 11/19/2018     11/19/2018    CRP 5.3 11/19/2018    SED 33 (H) 09/17/2018     11/19/2018    POTASSIUM 5.0 11/19/2018    CHLORIDE 100 11/19/2018    CO2 25 11/19/2018    BUN 42 (H) 11/19/2018    CR 6.44 (H) 11/19/2018     (H) 11/19/2018    TRINI 9.4 11/19/2018    PHOS 4.2 11/05/2018    MAG  "1.7 11/05/2018    ALBUMIN 3.0 (L) 10/26/2018    PROTTOTAL 7.3 10/26/2018    ALT 16 11/13/2018    AST 16 11/13/2018    ALKPHOS 187 (H) 11/13/2018    BILITOTAL 0.5 11/13/2018    AMYLASE 62 06/14/2018    PTT 37 08/12/2018    INR 1.05 09/10/2018    FIBR 407 08/12/2018    TSH 2.25 04/16/2018       Preop Vitals  BP Readings from Last 3 Encounters:   11/21/18 131/84   11/16/18 152/85   11/05/18 132/77    Pulse Readings from Last 3 Encounters:   11/21/18 105   11/16/18 99   11/05/18 101      Resp Readings from Last 3 Encounters:   11/21/18 16   11/05/18 16   10/10/18 18    SpO2 Readings from Last 3 Encounters:   11/21/18 100%   11/16/18 100%   11/05/18 100%      Temp Readings from Last 1 Encounters:   11/21/18 36.7  C (98.1  F) (Oral)    Ht Readings from Last 1 Encounters:   11/21/18 1.753 m (5' 9\")      Wt Readings from Last 1 Encounters:   11/21/18 77 kg (169 lb 12.1 oz)    Estimated body mass index is 25.07 kg/(m^2) as calculated from the following:    Height as of this encounter: 1.753 m (5' 9\").    Weight as of this encounter: 77 kg (169 lb 12.1 oz).     LDA:  Peripheral IV 09/24/18 Right Lower forearm (Active)   Number of days:58       Peripheral IV 11/16/18 Right Lower forearm (Active)   Number of days:5       Peripheral IV 11/21/18 Right Hand (Active)   Number of days:0       CVC Double Lumen 04/17/18 Right Subclavian (Active)   Site Assessment WDL 11/21/2018 11:09 AM   Lumen Soln/Vol REFERENCE 2.1/2.1 9/11/2018  4:45 PM   External Cath Length (cm) 4 cm 9/11/2018  4:45 PM   Extravasation? No 9/11/2018  4:45 PM   Dressing Intervention Chlorhexidine sponge;Transparent;New dressing 9/11/2018  4:45 PM   Dressing Change Due 09/18/18 9/11/2018  4:45 PM   CVC Comment CDI 9/8/2018 12:30 PM   CVC Lumen Assessment Red;Blue 9/8/2018 12:30 PM   Number of days:218       Colostomy (Active)   Stomal Appliance 1 piece 11/21/2018 11:09 AM   Stoma Assessment [place] 11/21/2018 11:09 AM   Peristomal Assessment UTV 9/11/2018  8:00 AM "   Stool Amount Small 9/11/2018  8:00 AM   Output (ml) 250 ml 9/10/2018 10:00 PM   Number of days:99            Assessment:   ASA SCORE: 3    NPO Status: > 6 hours since completed Solid Foods   Documentation: H&P complete; Preop Testing complete   Proceeding: Proceed without further delay  Tobacco Use:  NO Active use of Tobacco/UNKNOWN Tobacco use status     Plan:   Anes. Type:  MAC   Pre-Induction: Midazolam IV   Induction:  IV (Standard)      Access/Monitoring: PIV   Maintenance: Propofol; IV   Emergence: Procedure Site   Logistics: Same Day Surgery     Postop Pain/Sedation Strategy:  Standard-Options: Opioids PRN     PONV Management:  Adult Risk Factors:, Non-Smoker, Postop Opioids  Prevention: Propofol Infusion     CONSENT: Direct conversation   Plan and risks discussed with: Patient   Blood Products: Consent Deferred (Minimal Blood Loss)                         Pushpa Katz MD

## 2018-11-21 NOTE — OR NURSING
Writer called pt's Naresh navarrete.  He states he will be picking pt up after surgery, and staying with him tonight.

## 2018-11-21 NOTE — ANESTHESIA POSTPROCEDURE EVALUATION
Anesthesia POST Procedure Evaluation    Patient: Murray Nicholson   MRN:     [id number] Gender:   male   Age:    63 year old :      1955        Preoperative Diagnosis: Diverticulitis Of Colon    Procedure(s):  Examination Under Anesthesia, Flexible Sigmoidoscopy and Polypectomy   Postop Comments: No value filed.       Anesthesia Type:  MAC    Reportable Event: NO     PAIN: Uncomplicated   Sign Out status: Comfortable, Well controlled pain     PONV: No PONV   Sign Out status:  No Nausea or Vomiting     Neuro/Psych: Uneventful perioperative course   Sign Out Status: Preoperative baseline     Airway/Resp.: Uneventful perioperative course   Sign Out Status: Resp. Status within EXPECTED Parameters     CV: Uneventful perioperative course   Sign Out status: Appropriate BP and perfusion indices; Appropriate HR/Rhythm     Disposition:   Sign Out in:  PACU  Recovery Course: Uneventful  Follow-Up: Not required           Last Anesthesia Record Vitals:  CRNA VITALS  2018 1149 - 2018 1249      2018             NIBP: 117/79    Pulse: 96    SpO2: 100 %    Resp Rate (observed): 20          Last PACU/Preop Vitals:  Vitals:    18 1300 18 1315 18 1330   BP: (!) 157/93 (!) 154/93 148/88   Pulse:      Resp: 18 14 16   Temp: 36.6  C (97.8  F)  36.6  C (97.8  F)   SpO2: 100% 100% 98%         Electronically Signed By: Pushpa aKtz MD, 2018, 3:23 PM

## 2018-11-21 NOTE — DISCHARGE INSTRUCTIONS
Take it easy when you get home.  Remember, same day surgery DOES NOT MEAN SAME DAY RECOVERY!  Healing is a gradual process.  You will need some time to recover - you may be more tired than you realize at first.  Rest and relax for at least the first 24 hours at home.  You'll feel better and heal faster if you take good care of yourself.  [facility], [place]  Same-Day Surgery   Adult Discharge Orders & Instructions     For 24 hours after surgery    1. Get plenty of rest.  A responsible adult must stay with you for at least 24 hours after you leave the hospital.   2. Do not drive or use heavy equipment.  If you have weakness or tingling, don't drive or use heavy equipment until this feeling goes away.  3. Do not drink alcohol.  4. Avoid strenuous or risky activities.  Ask for help when climbing stairs.   5. You may feel lightheaded.  IF so, sit for a few minutes before standing.  Have someone help you get up.   6. If you have nausea (feel sick to your stomach): Drink only clear liquids such as apple juice, ginger ale, broth or 7-Up.  Rest may also help.  Be sure to drink enough fluids.  Move to a regular diet as you feel able.  7. You may have a slight fever. Call the doctor if your fever is over 100.3 F (38 C) (taken under the tongue) or lasts longer than 24 hours.  8. You may have a dry mouth, a sore throat, muscle aches or trouble sleeping.  These should go away after 24 hours.  9. Do not make important or legal decisions.   Call your doctor for any of the followin.  Signs of infection (fever, growing tenderness at the surgery site, a large amount of drainage or bleeding, severe pain, foul-smelling drainage, redness, swelling).    2. It has been over 8 to 10 hours since surgery and you are still not able to urinate (pass water).    3.  Headache for over 24 hours.    To contact a doctor, call Dr Tran's office at [phone] or:        [phone] and ask for the resident on  call for GI (answered 24 hours a day)      Emergency Department:    [facility]: [phone]       (TTY for hearing impaired: [phone])

## 2018-11-21 NOTE — IP AVS SNAPSHOT
MRN:[id number]                      After Visit Summary   11/21/2018    Murray Nicholson    MRN: [id number]           Thank you!     Thank you for choosing [facility] for your care. Our goal is always to provide you with excellent care. Hearing back from our patients is one way we can continue to improve our services. Please take a few minutes to complete the written survey that you may receive in the mail after you visit with us. Thank you!        Patient Information     Date Of Birth          1955        About your hospital stay     You were admitted on:  November 21, 2018 You last received care in the:  [facility] Day [facility]    You were discharged on:  November 21, 2018       Who to Call     For medical emergencies, please call 911.  For non-urgent questions about your medical care, please call your primary care provider or clinic, [phone]  For questions related to your surgery, please call your surgery clinic        Attending Provider     Provider Specialty    Rick Tran MD Colon and Rectal Surgery       Primary Care Provider Office Phone # Fax #    Yahir Alex Turcios [phone]      After Care Instructions     Diet Instructions       Resume pre-procedure diet            Discharge Instructions       Follow up appointment as instructed by Surgeon and/or RN            No Alcohol       For 24 hours after procedure and if taking narcotic pain medications or Metronidazole (FLAGYL).                  Your next 10 appointments already scheduled     Nov 26, 2018  1:00 PM CST   Lab with  LAB   [facility] ([facility])    [address] [place] [id number] [phone]            Nov 26, 2018  1:30 PM CST   US VAS ARTERIOVENOUS MAP BILATERAL DIALYSIS ACCESS with UCUSV2   [facility] US ([facility])    [address] [place] [id number]    [phone]           How do I prepare for my exam? (Food and drink instructions) No Food and Drink Restrictions.  How do I prepare for my exam? (Other instructions) You do not need to do anything special to prepare for your exam.  What should I wear: Wear comfortable clothes.  How long does the exam take: Most ultrasounds take 30 to 60 minutes.  What should I bring: Bring a list of your medicines, including vitamins, minerals and over-the-counter drugs. It is safest to leave personal items at home.  Do I need a :  No  is needed.  What do I need to tell my doctor: Tell your doctor about any allergies you may have.  What should I do after the exam: No restrictions, You may resume normal activities.  What is this test: An ultrasound uses sound waves to make pictures of the body. Sound waves do not cause pain. The only discomfort may be the pressure of the wand against your skin or full bladder.  Who should I call with questions: If you have any questions, please call the Imaging Department where you will have your exam. Directions, parking instructions, and other information is available on our website, [email or web address].            Nov 26, 2018  2:45 PM CST   (Arrive by 2:30 PM)   Fistula Consult with Calin Cheney MD   [facility] Solid Organ Transplant ([facility])    [address]  [address] [place] [id number] [phone]            Dec 03, 2018  8:00 AM CST   LAB with UU LAB GOLD [facility], Lab ([facility])    [address] [facility] [address]              Please do not eat 10-12 hours before your appointment if you are coming in fasting for labs on lipids, cholesterol, or glucose (sugar). This does not apply to pregnant women. Water, hot tea and black coffee (with nothing added) are okay. Do not drink other fluids, diet soda or chew gum.            Dec 03, 2018  8:30 AM CST   Ech  Complete with UUECHR2   [facility]  Ech[facility] ([facility])    [address] [facility] [facility] [address]   [phone]           1.  Please bring or wear a comfortable two-piece outfit. 2.  You may eat, drink and take your normal medicines. 3.  For any questions that cannot be answered, please contact the ordering physician 4.  Please do not wear perfumes or scented lotions on the day of your exam.            Dec 03, 2018  9:30 AM CST   Procedure - 2.5 hour with U2A ROOM 12   Unit [address]A [facility] ([facility])    [address] [address]               Dec 03, 2018 10:30 AM CST   Cath 90 Minute with UUHCVR5   [facility] ([facility])    [address] [facility] [address] [phone]            Dec 03, 2018  1:00 PM CST   (Arrive by 12:45 PM)   Return Visit with Rick Tran MD   [facility] Colon and Rectal Surgery ([facility] [place])    [address] [place] [address]  [address] [facility] [address]   [phone]            Dec 03, 2018  1:30 PM CST   (Arrive by 1:15 PM)   PAC EVALUATION with MAYTE Lopez   [facility] Preoperative Assessment Center ([facility])    [address] [facility]  4th Floor  [facility] [address] [phone]            Dec 03, 2018  3:30 PM CST   (Arrive by 3:15 PM)   RETURN HEART TRANSPLANT with Klever Ernst MD   [facility] Heart Care ([facility])    [address] [place] [address]  [address]  [facility] [address] [phone]              Further instructions from your care team       Take it easy when you get home.  Remember, same day surgery DOES NOT MEAN SAME DAY RECOVERY!  Healing is a gradual process.  You will need some time to recover - you may be more tired than you realize at first.  Rest and relax for at  least the first 24 hours at home.  You'll feel better and heal faster if you take good care of yourself.  [facility] [place] [facility] [place]  Same-Day Surgery   Adult Discharge Orders & Instructions     For 24 hours after surgery    1. Get plenty of rest.  A responsible adult must stay with you for at least 24 hours after you leave the hospital.   2. Do not drive or use heavy equipment.  If you have weakness or tingling, don't drive or use heavy equipment until this feeling goes away.  3. Do not drink alcohol.  4. Avoid strenuous or risky activities.  Ask for help when climbing stairs.   5. You may feel lightheaded.  IF so, sit for a few minutes before standing.  Have someone help you get up.   6. If you have nausea (feel sick to your stomach): Drink only clear liquids such as apple juice, ginger ale, broth or 7-Up.  Rest may also help.  Be sure to drink enough fluids.  Move to a regular diet as you feel able.  7. You may have a slight fever. Call the doctor if your fever is over 100.3 F (38 C) (taken under the tongue) or lasts longer than 24 hours.  8. You may have a dry mouth, a sore throat, muscle aches or trouble sleeping.  These should go away after 24 hours.  9. Do not make important or legal decisions.   Call your doctor for any of the followin.  Signs of infection (fever, growing tenderness at the surgery site, a large amount of drainage or bleeding, severe pain, foul-smelling drainage, redness, swelling).    2. It has been over 8 to 10 hours since surgery and you are still not able to urinate (pass water).    3.  Headache for over 24 hours.    To contact a doctor, call Dr Tran's office at [phone] or:        [phone] and ask for the resident on call for GI (answered 24 hours a day)      Emergency Department:    [facility]: [phone]       (TTY for hearing impaired: [phone])          Pending Results     Date and Time Order Name Status Description    2018  "1204 Surgical pathology exam In process             Admission Information     Date & Time Provider Department Dept. Phone    11/21/2018 Rick Tran MD Same Day Surgery [facility] [place] [phone]      Your Vitals Were     Blood Pressure Pulse Temperature Respirations Height Weight    141/90 105 98.1  F (36.7  C) (Oral) 16 1.753 m (5' 9\") 77 kg (169 lb 12.1 oz)    Pulse Oximetry BMI (Body Mass Index)                100% 25.07 kg/m2          MyChart Information     [facility] gives you secure access to your electronic health record. If you see a primary care provider, you can also send messages to your care team and make appointments. If you have questions, please call your primary care clinic.  If you do not have a primary care provider, please call [phone] and they will assist you.        Care EveryWhere ID     This is your Care EveryWhere ID. This could be used by other organizations to access your [facility] medical records  [id number]        Equal Access to Services     JOHN HAUSER : Hadii marsha wang hadasho Soomaali, waaxda luqadaha, qaybta kaalmada adeegyada, waxay jay palencia . So [facility] [phone].    ATENCIÓN: Si habla español, tiene a ortiz disposición servicios gratuitos de asistencia lingüística. Keely al [phone].    We comply with applicable federal civil rights laws and [place] laws. We do not discriminate on the basis of race, color, national origin, age, disability, sex, sexual orientation, or gender identity.               Review of your medicines      CONTINUE these medicines which may have CHANGED, or have new prescriptions. If we are uncertain of the size of tablets/capsules you have at home, strength may be listed as something that might have changed.        Dose / Directions    atorvastatin 40 MG tablet   Commonly known as:  LIPITOR   This may have changed:  additional instructions   Used for:  Heart replaced by transplant (H)        Dose:  40 mg   Take 1 " tablet (40 mg) by mouth daily   Quantity:  90 tablet   Refills:  3         CONTINUE these medicines which have NOT CHANGED        Dose / Directions    albuterol 108 (90 Base) MCG/ACT inhaler   Commonly known as:  PROAIR HFA/PROVENTIL HFA/VENTOLIN HFA        Dose:  2 puff   Inhale 2 puffs into the lungs every 4 hours as needed for shortness of breath / dyspnea or wheezing   Refills:  0       amLODIPine 10 MG tablet   Commonly known as:  NORVASC   Used for:  Hypertension goal BP (blood pressure) < 130/80        Dose:  10 mg   Take 1 tablet (10 mg) by mouth daily   Quantity:  90 tablet   Refills:  3       aspirin 81 MG tablet        Take 1 tablet (81 mg) by mouth at bedtime   Refills:  0       biotin 5 MG Caps   Commonly known as:  BIOTIN 5000   Used for:  Brittle nails        Dose:  5 mg   Take 5 mg by mouth daily   Quantity:  30 capsule   Refills:  3       blood glucose monitoring lancets   Used for:  Type 2 diabetes mellitus with stage 5 chronic kidney disease not on chronic dialysis, without long-term current use of insulin (H)        Use to test blood sugar 2-3 times daily or as directed.  Ok to substitute alternative if insurance prefers.   Quantity:  102 each   Refills:  11       blood glucose monitoring test strip   Commonly known as:  no brand specified   Used for:  Type 2 diabetes mellitus with stage 5 chronic kidney disease not on chronic dialysis, without long-term current use of insulin (H)        Use to test blood sugar 2-3 times daily or as directed.   Quantity:  100 each   Refills:  11       chlorhexidine 0.12 % solution   Commonly known as:  PERIDEX   Used for:  Oral ulceration        Dose:  15 mL   Swish and spit 15 mLs in mouth 2 times daily   Quantity:  900 mL   Refills:  0       ciprofloxacin 500 MG tablet   Commonly known as:  CIPRO   Indication:  Infection of the Skin and/or Related Soft Tissue, oral ulceration   Used for:  Oral ulceration        Dose:  500 mg   Take 1 tablet (500 mg) by mouth  every 24 hours   Quantity:  14 tablet   Refills:  0       cloNIDine 0.1 MG tablet   Commonly known as:  CATAPRES   Used for:  Hypertension, Heart replaced by transplant (H)        Dose:  0.1 mg   Take 1 tablet (0.1 mg) by mouth At Bedtime   Quantity:  90 tablet   Refills:  3       clotrimazole 10 MG kalpana   Used for:  Candidiasis of mouth        Dose:  1 Kalpana   Place 1 Kalpana (10 mg) inside cheek 4 times daily   Quantity:  120 Kalpana   Refills:  11       fluticasone 50 MCG/ACT spray   Commonly known as:  FLONASE   Used for:  Nasal congestion        Dose:  1-2 spray   Spray 1-2 sprays into both nostrils daily   Quantity:  16 g   Refills:  11       gentian violet 1 % solution   Used for:  Oral ulceration        Dose:  0.5 mL   Take 0.5 mLs by mouth 4 times daily   Quantity:  30 mL   Refills:  1       hydrALAZINE 100 MG Tabs tablet   Commonly known as:  APRESOLINE   Used for:  Essential hypertension        Dose:  100 mg   Take 1 tablet (100 mg) by mouth every 8 hours   Quantity:  90 tablet   Refills:  11       insulin aspart 100 UNIT/ML injection   Commonly known as:  NovoLOG PEN   Used for:  Type 2 diabetes mellitus with diabetic nephropathy, with long-term current use of insulin (H)        Dose:  1-7 Units   Inject 1-7 Units Subcutaneous 4 times daily (before meals and nightly)   Quantity:  9 mL   Refills:  3       * insulin isophane human 100 UNIT/ML injection   Commonly known as:  HumuLIN N PEN   Indication:  Type 2 Diabetes   Used for:  Type 2 diabetes mellitus with diabetic nephropathy, with long-term current use of insulin (H)        Dose:  16 Units   Inject 16 Units Subcutaneous every morning (before breakfast)   Quantity:  6 mL   Refills:  11       * insulin isophane human 100 UNIT/ML injection   Commonly known as:  HumuLIN N PEN   Indication:  Type 2 Diabetes   Used for:  Type 2 diabetes mellitus with diabetic nephropathy, with long-term current use of insulin (H)        Dose:  12 Units   Inject 12 Units  Subcutaneous daily (with dinner)   Quantity:  3 mL   Refills:  11       lisinopril 5 MG tablet   Commonly known as:  PRINIVIL/ZESTRIL   Indication:  High Blood Pressure Disorder   Used for:  Hypertension goal BP (blood pressure) < 130/80        Dose:  5 mg   Take 1 tablet (5 mg) by mouth daily   Quantity:  90 tablet   Refills:  3       loratadine 10 MG tablet   Commonly known as:  CLARITIN   Used for:  Hypertensive cardiopathy, SOB (shortness of breath)        Dose:  10 mg   Take 10 mg by mouth daily Reported on 5/3/2017   Refills:  0       melatonin 1 MG Tabs tablet   Used for:  Heart transplanted (H)        Dose:  2 mg   Take 2 tablets (2 mg) by mouth nightly as needed for sleep   Quantity:  120 tablet   Refills:  1       mycophenolate 250 MG capsule   Commonly known as:  GENERIC EQUIVALENT   Used for:  Heart transplanted (H)        Dose:  1000 mg   Take 4 capsules (1,000 mg) by mouth 2 times daily   Quantity:  240 capsule   Refills:  11       NEPHROCAPS 1 MG capsule        Dose:  1 capsule   Take 1 capsule by mouth daily   Quantity:  120 capsule   Refills:  0       order for DME   Used for:  CKD (chronic kidney disease) stage 5, GFR less than 15 ml/min (H)        Equipment being ordered: Commode () Treatment Diagnosis: ESRD on PD Pt has to be connected to PD all night and can not be disconnected, hence impending his mobility to go to the bathroom. At risk for infection if he does not have this equipment.   Quantity:  1 Units   Refills:  0       pantoprazole 40 MG EC tablet   Commonly known as:  PROTONIX   Indication:  GI prophylaxis   Used for:  Duodenitis        Dose:  40 mg   Take 1 tablet (40 mg) by mouth 2 times daily (before meals)   Quantity:  60 tablet   Refills:  11       pentamidine 300 MG neb solution   Commonly known as:  NEBUPENT   Indication:  PJP prophylaxis post OHT   Used for:  Heart replaced by transplant (H)        Dose:  300 mg   Inhale 300 mg into the lungs every 28 days Last given  9/19/18   Quantity:  300 mg   Refills:  0       polyethylene glycol Packet   Commonly known as:  MIRALAX/GLYCOLAX   Used for:  Constipation, unspecified constipation type        Dose:  17 g   Take 17 g by mouth daily as needed for constipation   Quantity:  7 packet   Refills:  0       predniSONE 5 MG tablet   Commonly known as:  DELTASONE   Indication:  Immunosupressant/S/p heart transplant   Used for:  Heart transplanted (H)        Dose:  5 mg   Take 1 tablet (5 mg) by mouth daily   Quantity:  60 tablet   Refills:  0       rosuvastatin 20 MG tablet   Commonly known as:  CRESTOR   Used for:  Heart transplant recipient (H)        Dose:  20 mg   Take 1 tablet (20 mg) by mouth daily   Quantity:  30 tablet   Refills:  1       simethicone 80 MG chewable tablet   Commonly known as:  MYLICON   Used for:  Flatulence, eructation and gas pain        Dose:  80 mg   Take 1 tablet (80 mg) by mouth every 6 hours as needed for cramping   Quantity:  180 tablet   Refills:  0       sulfamethoxazole-trimethoprim 400-80 MG per tablet   Commonly known as:  BACTRIM/SEPTRA   Used for:  Heart transplanted (H)        Once per day on Tuesday, Thursday and Saturday. Take after dialysis on dialysis days.   Quantity:  14 tablet   Refills:  3       tacrolimus 1 MG capsule   Commonly known as:  GENERIC EQUIVALENT   Used for:  Heart transplanted (H)        Dose:  1 mg   Take 1 capsule (1 mg) by mouth 2 times daily   Quantity:  60 capsule   Refills:  0       traMADol 50 MG tablet   Commonly known as:  ULTRAM   Used for:  Moderate pain        Dose:  50 mg   Take 1 tablet (50 mg) by mouth every 12 hours as needed for moderate pain   Quantity:  10 tablet   Refills:  0       triamcinolone 0.1 % ointment   Commonly known as:  KENALOG   Used for:  Xerosis of skin        Apply topically 2 times daily   Quantity:  80 g   Refills:  0       Urea 40 % Crea   Used for:  Brittle nails        Externally apply topically daily   Quantity:  85 g   Refills:  1        * Notice:  This list has 2 medication(s) that are the same as other medications prescribed for you. Read the directions carefully, and ask your doctor or other care provider to review them with you.             Protect others around you: Learn how to safely use, store and throw away your medicines at www.disposemymeds.org.             Medication List: This is a list of all your medications and when to take them. Check marks below indicate your daily home schedule. Keep this list as a reference.      Medications           Morning Afternoon Evening Bedtime As Needed    albuterol 108 (90 Base) MCG/ACT inhaler   Commonly known as:  PROAIR HFA/PROVENTIL HFA/VENTOLIN HFA   Inhale 2 puffs into the lungs every 4 hours as needed for shortness of breath / dyspnea or wheezing                                amLODIPine 10 MG tablet   Commonly known as:  NORVASC   Take 1 tablet (10 mg) by mouth daily                                aspirin 81 MG tablet   Take 1 tablet (81 mg) by mouth at bedtime                                atorvastatin 40 MG tablet   Commonly known as:  LIPITOR   Take 1 tablet (40 mg) by mouth daily                                biotin 5 MG Caps   Commonly known as:  BIOTIN 5000   Take 5 mg by mouth daily                                blood glucose monitoring lancets   Use to test blood sugar 2-3 times daily or as directed.  Ok to substitute alternative if insurance prefers.                                blood glucose monitoring test strip   Commonly known as:  no brand specified   Use to test blood sugar 2-3 times daily or as directed.                                chlorhexidine 0.12 % solution   Commonly known as:  PERIDEX   Swish and spit 15 mLs in mouth 2 times daily                                ciprofloxacin 500 MG tablet   Commonly known as:  CIPRO   Take 1 tablet (500 mg) by mouth every 24 hours                                cloNIDine 0.1 MG tablet   Commonly known as:  CATAPRES   Take 1 tablet  (0.1 mg) by mouth At Bedtime                                clotrimazole 10 MG kalpana   Place 1 Kalpana (10 mg) inside cheek 4 times daily                                fluticasone 50 MCG/ACT spray   Commonly known as:  FLONASE   Spray 1-2 sprays into both nostrils daily                                gentian violet 1 % solution   Take 0.5 mLs by mouth 4 times daily                                hydrALAZINE 100 MG Tabs tablet   Commonly known as:  APRESOLINE   Take 1 tablet (100 mg) by mouth every 8 hours                                insulin aspart 100 UNIT/ML injection   Commonly known as:  NovoLOG PEN   Inject 1-7 Units Subcutaneous 4 times daily (before meals and nightly)                                * insulin isophane human 100 UNIT/ML injection   Commonly known as:  HumuLIN N PEN   Inject 16 Units Subcutaneous every morning (before breakfast)                                * insulin isophane human 100 UNIT/ML injection   Commonly known as:  HumuLIN N PEN   Inject 12 Units Subcutaneous daily (with dinner)                                lisinopril 5 MG tablet   Commonly known as:  PRINIVIL/ZESTRIL   Take 1 tablet (5 mg) by mouth daily                                loratadine 10 MG tablet   Commonly known as:  CLARITIN   Take 10 mg by mouth daily Reported on 5/3/2017                                melatonin 1 MG Tabs tablet   Take 2 tablets (2 mg) by mouth nightly as needed for sleep                                mycophenolate 250 MG capsule   Commonly known as:  GENERIC EQUIVALENT   Take 4 capsules (1,000 mg) by mouth 2 times daily                                NEPHROCAPS 1 MG capsule   Take 1 capsule by mouth daily                                order for DME   Equipment being ordered: Carol () Treatment Diagnosis: ESRD on PD Pt has to be connected to PD all night and can not be disconnected, hence impending his mobility to go to the bathroom. At risk for infection if he does not have this  equipment.                                pantoprazole 40 MG EC tablet   Commonly known as:  PROTONIX   Take 1 tablet (40 mg) by mouth 2 times daily (before meals)                                pentamidine 300 MG neb solution   Commonly known as:  NEBUPENT   Inhale 300 mg into the lungs every 28 days Last given 9/19/18                                polyethylene glycol Packet   Commonly known as:  MIRALAX/GLYCOLAX   Take 17 g by mouth daily as needed for constipation                                predniSONE 5 MG tablet   Commonly known as:  DELTASONE   Take 1 tablet (5 mg) by mouth daily                                rosuvastatin 20 MG tablet   Commonly known as:  CRESTOR   Take 1 tablet (20 mg) by mouth daily                                simethicone 80 MG chewable tablet   Commonly known as:  MYLICON   Take 1 tablet (80 mg) by mouth every 6 hours as needed for cramping                                sulfamethoxazole-trimethoprim 400-80 MG per tablet   Commonly known as:  BACTRIM/SEPTRA   Once per day on Tuesday, Thursday and Saturday. Take after dialysis on dialysis days.                                tacrolimus 1 MG capsule   Commonly known as:  GENERIC EQUIVALENT   Take 1 capsule (1 mg) by mouth 2 times daily                                traMADol 50 MG tablet   Commonly known as:  ULTRAM   Take 1 tablet (50 mg) by mouth every 12 hours as needed for moderate pain                                triamcinolone 0.1 % ointment   Commonly known as:  KENALOG   Apply topically 2 times daily                                Urea 40 % Crea   Externally apply topically daily                                * Notice:  This list has 2 medication(s) that are the same as other medications prescribed for you. Read the directions carefully, and ask your doctor or other care provider to review them with you.

## 2018-11-21 NOTE — ANESTHESIA CARE TRANSFER NOTE
Patient: Murray Nicholson    Procedure(s):  Examination Under Anesthesia, Flexible Sigmoidoscopy and Polypectomy    Diagnosis: Diverticulitis Of Colon   Diagnosis Additional Information: No value filed.    Anesthesia Type:   No value filed.     Note:  Airway :Room Air  Patient transferred to:Phase II  Comments: Alert and oriented x 3, making needs knownHandoff Report: Identifed the Patient, Identified the Reponsible Provider, Reviewed the pertinent medical history, Discussed the surgical course, Reviewed Intra-OP anesthesia mangement and issues during anesthesia, Set expectations for post-procedure period and Allowed opportunity for questions and acknowledgement of understanding      Vitals: (Last set prior to Anesthesia Care Transfer)    CRNA VITALS  11/21/2018 1149 - 11/21/2018 1232      11/21/2018             NIBP: 117/79    Pulse: 97    SpO2: 100 %    Resp Rate (observed): 18                Electronically Signed By: Juliana Baron CRNA, APRN MAYA  November 21, 2018  12:32 PM

## 2018-11-21 NOTE — BRIEF OP NOTE
[facility] [place]    Brief Operative Note    Pre-operative diagnosis: Diverticulitis Of Colon   Post-operative diagnosis Same  Procedure: Procedure(s):  Examination Under Anesthesia, Flexible Sigmoidoscopy and Polypectomy  Surgeon: Surgeon(s) and Role:     * Rick Tran MD - Primary     * Susie Linares MD - Resident - Assisting     * Christopher Sood MD - Resident - Assisting  Anesthesia: Monitor Anesthesia Care   Estimated blood loss: None  Drains: None  Specimens:   ID Type Source Tests Collected by Time Destination   A : sigmoid polyp Polyp Large Intestine, Sigmoid SURGICAL PATHOLOGY EXAM Rick Tarn MD 11/21/2018 12:04 PM      Findings:   Well healed right posteriolateral fistulotomy site, 3mm rectal poylp at staple line..  Complications: None.  Implants: None.

## 2018-11-21 NOTE — OP NOTE
Procedure Date: 11/21/2018.      PREOPERATIVE DIAGNOSES:   1.  Complicated sigmoid diverticulitis with perforation and abscess (status post hand-assisted laparoscopic sigmoidectomy with end colostomy, as well as a small bowel resection on 8/14/2018).   2.  History of anorectal abscess with fistula (status post incision and drainage with a partial fistulotomy in 08/2018).   3.  End-stage renal disease (on hemodialysis).   4.  Dyslipidemia.   5.  Hypertension.   6.  Hypertensive cardiomyopathy (status post heart transplantation in 06/2018).   7.  Diabetes mellitus type 2.   8.  History of Clostridium difficile colitis.      POSTOPERATIVE DIAGNOSES:   1.  Complicated sigmoid diverticulitis with perforation and abscess (status post hand-assisted laparoscopic sigmoidectomy with end colostomy, as well as a small bowel resection on 8/14/2018).   2.  History of anorectal abscess with fistula (status post incision and drainage with a partial fistulotomy in 08/2018).   3.  End-stage renal disease (on hemodialysis).   4.  Dyslipidemia.   5.  Hypertension.   6.  Hypertensive cardiomyopathy (status post heart transplantation in 06/2018).   7.  Diabetes mellitus type 2.   8.  History of Clostridium difficile colitis.      ANESTHESIA:  MAC plus local anesthetic.      PROCEDURES PERFORMED:   1.  Evaluation under anesthesia.   2.  Flexible sigmoidoscopy.      SURGEON:  Rick Tran MD      ASSISTANTS:  Christopher Sood MD, (Colon and Rectal Surgery Fellow) and Susie Linares MD, (General Surgery resident).      DESCRIPTION OF PROCEDURE:  After obtaining informed consent, the patient was brought to the operating room and placed in the prone jackknife position.  MAC anesthesia was gently induced without difficulty.  The patient received appropriate preoperative antibiotic prophylaxis, as well as mechanical DVT prophylaxis.  Bilateral lower extremity pneumatic compression devices were applied and all pressure points were cushioned.   "The perianal area was prepped and draped in the standard sterile fashion.  A \"timeout\" was performed.  A digital rectal exam, as well as anoscopy, were performed and these revealed a healed fistulotomy wound at the right posterior aspect of the anus.  There was a palpable sphincter defect, but the tone of the anal sphincter complex appeared to be appropriate.  There were no palpable lesions upon digital rectal exam.  A total of 30 mL of bupivacaine 0.25% was injected as a pudendal block.  After this, a pediatric colonoscope was passed transanally all the way up to the top of the Noni's pouch.  The entire Noni's pouch measured approximately 13-14 cm from the anal verge.  The colonic mucosa appeared to be healthy with no evidence of inflammation, ulceration, or neoplasia.  Retroflexion was not performed in the rectum.  We did find a 3 mm pedunculated polyp near the staple line, and this was removed with a cold biopsy forceps.  The specimen was retrieved and sent for permanent pathology.  No additional findings.  Instrument, sponge, and needle counts were all correct as reported to me.  The patient tolerated the procedure well.      COMPLICATIONS:  None immediately.      ESTIMATED BLOOD LOSS:  0 mL.      REPLACEMENT:  100 mL of crystalloid.      SPECIMENS:  Sigmoid colon polyp.      DRAINS/TUBES:  None.      FINDINGS:  A healed fistulotomy wound at the right posterior aspect of the anus.  A 3 mm sigmoid colon polyp near the Noni staple line.      DISPOSITION:  PACU.         ELEANOR HANNAH MD             D: 2018   T: 2018   MT: CL      Name:     SANCHEZ MCNEIL   MRN:      [id number]        Account:        [id number]   :      1955           Procedure Date: 2018      Document: [id number]       cc: Yahir Lawrence MD     "

## 2018-11-21 NOTE — IP AVS SNAPSHOT
Same Day Surgery [address] [address]    Phone:  [phone]                                       After Visit Summary   11/21/2018    Murray Nicholson    MRN: [id number]           After Visit Summary Signature Page     I have received my discharge instructions, and my questions have been answered. I have discussed any challenges I see with this plan with the nurse or doctor.    ..........................................................................................................................................  Patient/Patient Representative Signature      ..........................................................................................................................................  Patient Representative Print Name and Relationship to Patient    ..................................................               ................................................  Date                                   Time    ..........................................................................................................................................  Reviewed by Signature/Title    ...................................................              ..............................................  Date                                               Time          22EPIC Rev 08/18

## 2018-11-23 LAB — COPATH REPORT: NORMAL

## 2018-11-26 ENCOUNTER — OFFICE VISIT (OUTPATIENT)
Dept: TRANSPLANT | Facility: CLINIC | Age: 63
End: 2018-11-26
Attending: SURGERY
Payer: MEDICARE

## 2018-11-26 ENCOUNTER — RADIANT APPOINTMENT (OUTPATIENT)
Dept: ULTRASOUND IMAGING | Facility: CLINIC | Age: 63
End: 2018-11-26
Attending: CLINICAL NURSE SPECIALIST
Payer: MEDICARE

## 2018-11-26 VITALS
HEIGHT: 69 IN | BODY MASS INDEX: 25.07 KG/M2 | TEMPERATURE: 97.7 F | HEART RATE: 98 BPM | DIASTOLIC BLOOD PRESSURE: 92 MMHG | SYSTOLIC BLOOD PRESSURE: 164 MMHG | OXYGEN SATURATION: 100 %

## 2018-11-26 DIAGNOSIS — Z45.2 ADJUSTMENT AND MANAGEMENT OF VASCULAR ACCESS DEVICE: ICD-10-CM

## 2018-11-26 DIAGNOSIS — Z94.1 HEART REPLACED BY TRANSPLANT (H): ICD-10-CM

## 2018-11-26 DIAGNOSIS — Z94.1 HEART TRANSPLANTED (H): ICD-10-CM

## 2018-11-26 DIAGNOSIS — M1A.0390 CHRONIC GOUT OF WRIST, UNSPECIFIED CAUSE, UNSPECIFIED LATERALITY: ICD-10-CM

## 2018-11-26 DIAGNOSIS — Z94.3 S/P HEART AND LUNG TRANSPLANT (H): ICD-10-CM

## 2018-11-26 DIAGNOSIS — M10.9 GOUT, UNSPECIFIED CAUSE, UNSPECIFIED CHRONICITY, UNSPECIFIED SITE: ICD-10-CM

## 2018-11-26 DIAGNOSIS — N18.6 ESRD ON DIALYSIS (H): ICD-10-CM

## 2018-11-26 DIAGNOSIS — Z99.2 ESRD ON DIALYSIS (H): ICD-10-CM

## 2018-11-26 DIAGNOSIS — N18.6 ENCOUNTER REGARDING VASCULAR ACCESS FOR DIALYSIS FOR ESRD (H): Primary | ICD-10-CM

## 2018-11-26 DIAGNOSIS — Z99.2 ENCOUNTER REGARDING VASCULAR ACCESS FOR DIALYSIS FOR ESRD (H): Primary | ICD-10-CM

## 2018-11-26 DIAGNOSIS — Z01.818 ENCOUNTER FOR OTHER PREPROCEDURAL EXAMINATION: ICD-10-CM

## 2018-11-26 LAB
ANION GAP SERPL CALCULATED.3IONS-SCNC: 10 MMOL/L (ref 3–14)
BASOPHILS # BLD AUTO: 0 10E9/L (ref 0–0.2)
BASOPHILS NFR BLD AUTO: 0.8 %
BUN SERPL-MCNC: 47 MG/DL (ref 7–30)
CALCIUM SERPL-MCNC: 9.4 MG/DL (ref 8.5–10.1)
CHLORIDE SERPL-SCNC: 103 MMOL/L (ref 94–109)
CO2 SERPL-SCNC: 23 MMOL/L (ref 20–32)
CREAT SERPL-MCNC: 6.45 MG/DL (ref 0.66–1.25)
CRP SERPL-MCNC: 5.5 MG/L (ref 0–8)
DIFFERENTIAL METHOD BLD: ABNORMAL
EOSINOPHIL # BLD AUTO: 0.2 10E9/L (ref 0–0.7)
EOSINOPHIL NFR BLD AUTO: 3.5 %
ERYTHROCYTE [DISTWIDTH] IN BLOOD BY AUTOMATED COUNT: 17.9 % (ref 10–15)
GFR SERPL CREATININE-BSD FRML MDRD: 9 ML/MIN/1.7M2
GLUCOSE SERPL-MCNC: 184 MG/DL (ref 70–99)
HCT VFR BLD AUTO: 37.3 % (ref 40–53)
HGB BLD-MCNC: 11.5 G/DL (ref 13.3–17.7)
IMM GRANULOCYTES # BLD: 0.1 10E9/L (ref 0–0.4)
IMM GRANULOCYTES NFR BLD: 1.2 %
LYMPHOCYTES # BLD AUTO: 0.6 10E9/L (ref 0.8–5.3)
LYMPHOCYTES NFR BLD AUTO: 12.1 %
MCH RBC QN AUTO: 28.2 PG (ref 26.5–33)
MCHC RBC AUTO-ENTMCNC: 30.8 G/DL (ref 31.5–36.5)
MCV RBC AUTO: 91 FL (ref 78–100)
MONOCYTES # BLD AUTO: 0.5 10E9/L (ref 0–1.3)
MONOCYTES NFR BLD AUTO: 10.7 %
NEUTROPHILS # BLD AUTO: 3.5 10E9/L (ref 1.6–8.3)
NEUTROPHILS NFR BLD AUTO: 71.7 %
NRBC # BLD AUTO: 0 10*3/UL
NRBC BLD AUTO-RTO: 0 /100
PLATELET # BLD AUTO: 234 10E9/L (ref 150–450)
POTASSIUM SERPL-SCNC: 4.6 MMOL/L (ref 3.4–5.3)
RBC # BLD AUTO: 4.08 10E12/L (ref 4.4–5.9)
SODIUM SERPL-SCNC: 137 MMOL/L (ref 133–144)
URATE SERPL-MCNC: 4.7 MG/DL (ref 3.5–7.2)
WBC # BLD AUTO: 4.9 10E9/L (ref 4–11)

## 2018-11-26 PROCEDURE — G0463 HOSPITAL OUTPT CLINIC VISIT: HCPCS | Mod: ZF

## 2018-11-26 PROCEDURE — 80048 BASIC METABOLIC PNL TOTAL CA: CPT | Performed by: INTERNAL MEDICINE

## 2018-11-26 PROCEDURE — 85025 COMPLETE CBC W/AUTO DIFF WBC: CPT | Performed by: INTERNAL MEDICINE

## 2018-11-26 PROCEDURE — 36415 COLL VENOUS BLD VENIPUNCTURE: CPT | Performed by: INTERNAL MEDICINE

## 2018-11-26 PROCEDURE — 40000809 ZZH STATISTIC NO DOCUMENTATION TO SUPPORT CHARGE

## 2018-11-26 PROCEDURE — 86140 C-REACTIVE PROTEIN: CPT | Performed by: INTERNAL MEDICINE

## 2018-11-26 PROCEDURE — 84550 ASSAY OF BLOOD/URIC ACID: CPT | Performed by: INTERNAL MEDICINE

## 2018-11-26 ASSESSMENT — PAIN SCALES - GENERAL: PAINLEVEL: NO PAIN (0)

## 2018-11-26 NOTE — PROGRESS NOTES
Dialysis Access Service  Consult Note    Referred by Dr. Mcpherson for permanent dialysis access.    HPI: Mr. Nicholson is being seen today for placement of permanent dialysis access due to End Stage renal failure from diabetes mellitus type 2.  He is right handed. Mr. Nicholson is dialyzing at [facility]  [address] [place] [address].      Past Surgical History:   Procedure Laterality Date     CARDIAC SURGERY       COLONOSCOPY N/A 5/3/2018    Procedure: COLONOSCOPY;  colonoscopy ;  Surgeon: Ammon Castillo MD;  Location: [facility]     ESOPHAGOSCOPY, GASTROSCOPY, DUODENOSCOPY (EGD), COMBINED N/A 5/7/2018    Procedure: COMBINED ENDOSCOPIC ULTRASOUND, ESOPHAGOSCOPY, GASTROSCOPY, DUODENOSCOPY (EGD), FINE NEEDLE ASPIRATE/BIOPSY;  Endoscopic Ultrasound with Fine Needle Aspiration ;  Surgeon: Alon Don MD;  [facility]: [facility]     EXAM UNDER ANESTHESIA RECTUM N/A 8/12/2018    Procedure: EXAM UNDER ANESTHESIA RECTUM;  EXAM UNDER ANESTHESIA RECTUM ,COMBINED INCISION AND DRAINAGE OF RECTAL ABCESS ;  Surgeon: Rick Tran MD;  [facility]: [facility]     INCISION AND DRAINAGE RECTUM, COMBINED N/A 8/12/2018    Procedure: COMBINED INCISION AND DRAINAGE RECTUM;;  Surgeon: Rick Tran MD;  [facility]     LAPAROSCOPIC ASSISTED SIGMOID COLECTOMY N/A 8/14/2018    Procedure: LAPAROSCOPIC ASSISTED SIGMOID COLECTOMY;  Laparoscopic Hand Assisted Takedown of Splenic Flexure, Sigmoidectomy, Small Bowel Resection, Takedown of Small Bowel to Colon Fistula;  Surgeon: Rick Tran MD;  [facility]     LAPAROSCOPIC HERNIORRHAPHY INGUINAL BILATERAL Bilateral 7/24/2015    Procedure: LAPAROSCOPIC HERNIORRHAPHY INGUINAL BILATERAL;  Surgeon: Bobby Mcconnell MD;  [facility]     LAPAROSCOPIC INSERTION CATHETER PERITONEAL DIALYSIS N/A 6/22/2017    Procedure: LAPAROSCOPIC INSERTION CATHETER PERITONEAL DIALYSIS;  Laparoscopic Peritoneal Dialysis Catheter  Placement - Anesthesia with block;  Surgeon: Esteban Arvizu MD;  [facility]     PICC INSERTION Left 04/22/2018    5Fr - 49cm (3cm external), Basilic vein, low SVC     REMOVE CATHETER PERITONEAL Right 1/15/2018    Procedure: REMOVE CATHETER PERITONEAL;  Open Removal of Peritoneal Dialysis Catheter ;  Surgeon: Esteban Arvizu MD;  [facility]     SIGMOIDOSCOPY FLEXIBLE N/A 11/21/2018    Procedure: Examination Under Anesthesia, Flexible Sigmoidoscopy and Polypectomy;  Surgeon: Rick Tran MD;  [facility]     TRANSPLANT HEART RECIPIENT N/A 6/14/2018    Procedure: TRANSPLANT HEART RECIPIENT;  Median Sternotomy, on-pump oxygenator, Heart Transplant;  Surgeon: Rony Caputo MD;  [facility]       Risk factors for vascular access are:     Hx of CVC    []    Comment:   Hx of PICC line         []    Comment:   Hx of Pacemaker    []    Comment:   History of failed access:  []           SVC syndrome   []       Heart Failure    []    EF:    Periph arterial disease  []      Prior Fracture/Surgery  []    Location:   DVT    []    Location:  Diabetes   []         Neuropathy   []    Comment:   Anticoagulation:   []    Agent:      Anticoagulation contraindication: []    Details:                   Pediatric    []                           Hx of transplant   []   Comment:     Current immunosuppression []   Comment:                                  PHYSICAL EXAM:  Temp:  [97.7  F (36.5  C)] 97.7  F (36.5  C)  Pulse:  [98] 98  BP: (164-180)/() 164/92  SpO2:  [100 %] 100 %  alert and cooperative  EXTREMITY EXAM:   Vein Exam: Obvious suitable veins?    Left arm: YES   []           NO  [x]       Comment:    Right arm: YES   []           NO  [x]       Comment:   Arterial Exam:   Radial   L: 3+ R: 3+   Ulnar   L: 1+;R: 1+  Patent Palmar arch  L: YES   [x]  NO   []       R: YES   [x]   NO   []        Capillary refill:  L: <3sec, R:<3sec    Sensory exam:   Left hand: Normal   [x]       Abnormal    []     Comment:    Right hand: Normal   [x]       Abnormal   []     Comment:     Motor exam normal:   Left hand: Normal   [x]       Abnormal   []     Comment:     Right hand: Normal   [x]       Abnormal   []     Comment:                       Mapping Reviewed:  Target vein: left axillary vein  Target artery: brachial artery at the distal upper arm (AC fossa)    Assessment & Plan: Mr. Nicholson is a good candidate for placement of permanent dialysis access.  I would recommend left radial artery to axillary vein AVG Bovine creation under General and Scalene Block.     The surgical risks and benefits were reviewed and questions were answered. We discussed the day of surgery plan, anesthesia, postop care, risk of infection, numbness, injury to surrounding structures, bleeding, thrombosis, steal syndrome, possible need for future angioplasty or surgical revision, as well as nonmaturation or need for site abandonment. This was contrasted with morbidity and mortality risk of long-term catheter based hemodialysis access. The patient does wish to proceed with surgery for permanent access creation at this time. The patient was counselled to contact our nurse coordinator, VERONICA Daniel (Sum), [facility] at [phone] with any questions or concerns.  Thank you for the opportunity to participate in Mr. Nicholson's care.    Ayan Laguna,  Transplant Surgery Department,  Fellow,  [phone]      I have reviewed history, examined patient and discussed plan with the fellow/resident/EARL.  I concur with the findings in this note.       Risks of the surgical procedure including but not limited to the rare risk of mortality discussed in detail. Patient verbalized good understanding and had several pertinent questions which were answered satisfactorily. '    Total time: 30 min  Counseling time: 20 min      .

## 2018-11-26 NOTE — LETTER
11/26/2018      RE: Murray Nicholson  [address]  [place] [address]       Dialysis Access Service  Consult Note    Referred by Dr. Mcpherson for permanent dialysis access.    HPI: Mr. Nicholson is being seen today for placement of permanent dialysis access due to End Stage renal failure from diabetes mellitus type 2.  He is right handed. Mr. Nicholson is dialyzing at [facility] ([facility])  [address] [place] [address].      Past Surgical History:   Procedure Laterality Date     CARDIAC SURGERY       COLONOSCOPY N/A 5/3/2018    Procedure: COLONOSCOPY;  colonoscopy ;  Surgeon: Ammon Castillo MD;  [facility]     ESOPHAGOSCOPY, GASTROSCOPY, DUODENOSCOPY (EGD), COMBINED N/A 5/7/2018    Procedure: COMBINED ENDOSCOPIC ULTRASOUND, ESOPHAGOSCOPY, GASTROSCOPY, DUODENOSCOPY (EGD), FINE NEEDLE ASPIRATE/BIOPSY;  Endoscopic Ultrasound with Fine Needle Aspiration ;  Surgeon: Alon Don MD;  [facility]     EXAM UNDER ANESTHESIA RECTUM N/A 8/12/2018    Procedure: EXAM UNDER ANESTHESIA RECTUM;  EXAM UNDER ANESTHESIA RECTUM ,COMBINED INCISION AND DRAINAGE OF RECTAL ABCESS ;  Surgeon: Rick Tran MD;  [facility]     INCISION AND DRAINAGE RECTUM, COMBINED N/A 8/12/2018    Procedure: COMBINED INCISION AND DRAINAGE RECTUM;;  Surgeon: Rick Tran MD;  [facility]     LAPAROSCOPIC ASSISTED SIGMOID COLECTOMY N/A 8/14/2018    Procedure: LAPAROSCOPIC ASSISTED SIGMOID COLECTOMY;  Laparoscopic Hand Assisted Takedown of Splenic Flexure, Sigmoidectomy, Small Bowel Resection, Takedown of Small Bowel to Colon Fistula;  Surgeon: Rick Tran MD;  [facility]     LAPAROSCOPIC HERNIORRHAPHY INGUINAL BILATERAL Bilateral 7/24/2015    Procedure: LAPAROSCOPIC HERNIORRHAPHY INGUINAL BILATERAL;  Surgeon: Bobby Mcconnell MD;  [facility]     LAPAROSCOPIC INSERTION CATHETER PERITONEAL DIALYSIS N/A 6/22/2017    Procedure: LAPAROSCOPIC  INSERTION CATHETER PERITONEAL DIALYSIS;  Laparoscopic Peritoneal Dialysis Catheter Placement - Anesthesia with block;  Surgeon: Esteban Arvizu MD;  [facility]     PICC INSERTION Left 04/22/2018    5Fr - 49cm (3cm external), Basilic vein, low SVC     REMOVE CATHETER PERITONEAL Right 1/15/2018    Procedure: REMOVE CATHETER PERITONEAL;  Open Removal of Peritoneal Dialysis Catheter ;  Surgeon: Esteban Arvizu MD;  [facility]     SIGMOIDOSCOPY FLEXIBLE N/A 11/21/2018    Procedure: Examination Under Anesthesia, Flexible Sigmoidoscopy and Polypectomy;  Surgeon: Rick Tran MD;  [facility]     TRANSPLANT HEART RECIPIENT N/A 6/14/2018    Procedure: TRANSPLANT HEART RECIPIENT;  Median Sternotomy, on-pump oxygenator, Heart Transplant;  Surgeon: Rony Caputo MD;  [facility]       Risk factors for vascular access are:     Hx of CVC    []    Comment:   Hx of PICC line         []    Comment:   Hx of Pacemaker    []    Comment:   History of failed access:  []           SVC syndrome   []       Heart Failure    []    EF:    Periph arterial disease  []      Prior Fracture/Surgery  []    Location:   DVT    []    Location:  Diabetes   []         Neuropathy   []    Comment:   Anticoagulation:   []    Agent:      Anticoagulation contraindication: []    Details:                   Pediatric    []                           Hx of transplant   []   Comment:     Current immunosuppression []   Comment:                                  PHYSICAL EXAM:  Temp:  [97.7  F (36.5  C)] 97.7  F (36.5  C)  Pulse:  [98] 98  BP: (164-180)/() 164/92  SpO2:  [100 %] 100 %  alert and cooperative  EXTREMITY EXAM:   Vein Exam: Obvious suitable veins?    Left arm: YES   []           NO  [x]       Comment:    Right arm: YES   []           NO  [x]       Comment:   Arterial Exam:   Radial   L: 3+ R: 3+   Ulnar   L: 1+;R: 1+  Patent Palmar arch  L: YES   [x]  NO   []       R: YES   [x]   NO   []        Capillary  refill:  L: <3sec, R:<3sec    Sensory exam:   Left hand: Normal   [x]       Abnormal   []     Comment:    Right hand: Normal   [x]       Abnormal   []     Comment:     Motor exam normal:   Left hand: Normal   [x]       Abnormal   []     Comment:     Right hand: Normal   [x]       Abnormal   []     Comment:                       Mapping Reviewed:  Target vein: left axillary vein  Target artery: brachial artery at the distal upper arm (AC fossa)    Assessment & Plan: Mr. Nicholson is a good candidate for placement of permanent dialysis access.  I would recommend left radial artery to axillary vein AVG Bovine creation under General and Scalene Block.     The surgical risks and benefits were reviewed and questions were answered. We discussed the day of surgery plan, anesthesia, postop care, risk of infection, numbness, injury to surrounding structures, bleeding, thrombosis, steal syndrome, possible need for future angioplasty or surgical revision, as well as nonmaturation or need for site abandonment. This was contrasted with morbidity and mortality risk of long-term catheter based hemodialysis access. The patient does wish to proceed with surgery for permanent access creation at this time. The patient was counselled to contact our nurse coordinator, VERONICA Daniel (Sum), [facility] at [phone] with any questions or concerns.  Thank you for the opportunity to participate in Mr. Nicholson's care.    Ayan Laguna,  Transplant Surgery Department,  Fellow,  [phone]      I have reviewed history, examined patient and discussed plan with the fellow/resident/EARL.  I concur with the findings in this note.       Risks of the surgical procedure including but not limited to the rare risk of mortality discussed in detail. Patient verbalized good understanding and had several pertinent questions which were answered satisfactorily. '    Total time: 30 min  Counseling time: 20 min      .

## 2018-11-26 NOTE — PROGRESS NOTES
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   --    < >  3.9   < >   --    < >  4.5   < >  5.7*   CHLORIDE  100  100  103   < >   --    < >  98   < >   --    < >  102   < >  101   CO2  25  23  26   < >   --    < >  29   < >   --    < >  20   < >  27   ANIONGAP  9  10  8   < >   --    < >  7   < >   --    < >  10   < >  8   BUN  42*  63*  46*   < >   --    < >  24   < >   --    < >  48*   < >  48*   CR  6.44*  7.87*  6.20*   < >   --    < >  3.81*   < >   --    < >  7.08*   < >  4.82*   GFRESTIMATED  9*  7*  9*   < >   --    < >  16*   < >   --    < >  8*   < >  12*   GLC  135*  181*  94   < >   --    < >  121*   < >   --    < >  106*   < >  145*   A1C   --   5.2   --    --    --    --    --    --    --    --    --    --   7.0*   TRINI  9.4  9.2  9.7   < >   --    < >  7.4*   < >   --    < >  7.5*   < >  8.7   PHOS   --    --   4.2   < >   --    --   2.6   < >   --    < >  5.7*   < >  2.2*   MAG   --    --   1.7   < >   --    --   1.6   < >   --    < >  2.5*   < >  1.6   LACT   --    --    --    --   1.3   --    --    --   0.7   --    --    < >   --    PCAL   --    --    --    --    --    --    --    --    --    --   29.21*   < >   --    CKT   --    --    --    --    --    --   37   --    --    --     --    --   131    < > = values in this interval not displayed.     Hepatic Studies    Recent Labs   Lab Test  11/13/18   0849  10/26/18   0739  10/19/18   0942   09/10/18   0640 08/31/18   0646   06/25/18   0509   BILITOTAL  0.5  0.5  0.5   < >  0.5   < >  0.5   < >   --    DBIL   --    --    --    --    --    --   0.2   < >   --    ALKPHOS  187*  155*  139   < >  141   < >  133   < >   --    PROTTOTAL   --   7.3  7.2   --   5.0*   < >  4.7*   < >   --    ALBUMIN   --   3.0*  2.8*   --   2.0*   < >  1.6*   < >   --    AST  16  15  21   < >  14   < >  17   < >   --    ALT  16  19  22   < >  14   < >  14   < >   --    LDH   --    --    --    --    --    --   259*   --   271*    < > = values in this interval not displayed.     Pancreatitis testing    Recent Labs   Lab Test  09/10/18   0640   06/14/18   1207   AMYLASE   --    --   62   TRIG  71   < >   --     < > = values in this interval not displayed.     Gout Labs      Recent Labs   Lab Test  11/13/18   0849  10/26/18   0739  10/05/18   0838  10/03/18   1405  09/28/18   0805   URIC  6.2  2.7*  3.2*  3.8  2.4*     Hematology Studies      Recent Labs   Lab Test  11/19/18 0949  11/13/18 0849  11/05/18   0938  10/26/18   0739  10/19/18   0942  10/12/18   0802   09/28/18   0805   WBC  5.9  6.8  5.6  6.3  8.0  10.0   < >  4.4   ANEU   --    --    --   4.1   --    --    --   3.0   ALYM   --    --    --   0.7*   --    --    --   0.4*   DK   --    --    --   0.9   --    --    --   0.9   AEOS   --    --    --   0.3   --    --    --   0.0   HGB  11.6*  11.7*  11.4*  10.4*  9.3*  9.1*   < >  9.5*   HCT  37.4*  37.6*  37.2*  33.5*  32.0*  29.8*   < >  30.9*   PLT  289  321  275  236  244  308   < >  275    < > = values in this interval not displayed.     Clotting Studies    Recent Labs   Lab Test  09/10/18   0640  09/03/18   0616  08/27/18   0644  08/20/18   1105  08/12/18   0834   INR  1.05  1.05  1.07  0.99  1.31*   PTT   --    --    --    --   37     Iron Testing     Recent Labs   Lab Test  11/19/18   0949   08/31/18   0646   07/10/18   0711   05/08/18   0954   07/19/17   1306   IRON   --    --    --    --   66   --   58   --   46   FEB   --    --    --    --   238*   --   218*   --   263   IRONSAT   --    --    --    --   28   --   27   --   18   ALBERTINA   --    --    --    --   771*   --   621*   --   369   MCV  94   < >  96   < >  92   < >   --    < >   --    FOLIC   --    --   26.9   --    --    --    --    < >   --    B12   --    --   >6000*   --    --    --    --    --    --    HAPT   --    --   184*   < >   --    --    --    --    --    RETP   --    --   2.7*   < >   --    --    --    --    --    RETICABSCT   --    --   60.3   < >   --    --    --    --    --     < > = values in this interval not displayed.     Arterial Blood Gas Testing    Recent Labs   Lab Test  06/16/18   0836  06/16/18   0828  06/16/18   0655  06/16/18   0334  06/15/18   2200  06/15/18   1956   PH   --   7.43  7.43  7.46*  7.47*  7.45   PCO2   --   38  38  35  34*  36   PO2   --   120*  171*  180*  169*  172*   HCO3   --   25  25  25  25  25   O2PER  4L  4L  40  40  40  40  40     Thyroid Studies     Recent Labs   Lab Test  04/16/18   1546  02/21/18   0900  04/12/17   0935  04/09/15   0900  05/15/13   1418   TSH  2.25  3.59  1.26  3.47  2.05     Urine Studies     Recent Labs   Lab Test  10/03/18   1407  09/17/18   0320  07/28/18   0528  07/18/18   0855  06/25/18   1420   URINEPH  5.0  7.5*  5.5  6.0  5.0   NITRITE  Negative  Negative  Negative  Negative  Negative   LEUKEST  Trace*  Negative  Negative  Trace*  Small*   WBCU  10*  2  5  12*  9*     Medication levels    Recent Labs   Lab Test  11/05/18   0938   07/28/18   0716   01/17/18   0900   VANCOMYCIN   --    --   15.4   < >   --    GENT   --    --    --    --   8.2   TACROL  7.6   < >  24.7*   < >   --     < > = values in this interval not displayed.     Body fluid stats    Recent Labs   Lab Test  09/19/18   1654   01/15/18   0000  01/13/18   6976   01/13/18   0030   FTYP   --    --   Peritoneal fluid  Peritoneal fluid  Peritoneal fluid   FCOL   --    --   Colorless  Yellow  Colorless   FAPR   --    --   Cloudy  Cloudy  Slightly Cloudy   FRBC   --    --   << Do Not Report >>   --   << Do Not Report >>   FWBC   --    --   4256  268  2527   FNEU   --    --   91  56  88   FLYM   --    --   2  21   --    FMONO   --    --   6  23  11   FBAS   --    --   1   --   1   GS  Many  Gram positive rods  *  Rare  Gram positive cocci in clusters  *  Few  WBC'S seen  Few  PMNs seen     < >   --    --    --     < > = values in this interval not displayed.     Microbiology:    Last Culture results with specimen source  Culture Micro   Date Value Ref Range Status   10/05/2018 No growth  Final   10/05/2018 No growth  Final   10/03/2018 <10,000 colonies/mL  urogenital tiffany    Final   10/03/2018 Susceptibility testing not routinely done  Final   09/19/2018 Moderate growth  Klebsiella pneumoniae   (A)  Final   09/19/2018 Plus  Heavy growth  Normal oral tiffany    Final   09/19/2018 Canceled, Test credited  Final   09/19/2018 swab not recieved in containter   Final   09/19/2018   Final    Notification of test cancellation was given to  Aminah Wang RN @ 5133 9/19/18      09/16/2018 No growth  Final   09/16/2018 No growth  Final   09/02/2018 No growth  Final   09/02/2018 No growth  Final   08/31/2018 No growth  Final   08/31/2018 No growth  Final   08/31/2018 No growth  Final   08/31/2018 No growth  Final   08/14/2018 (A)  Final    Light growth  Veillonella species  Susceptibility testing not routinely done     08/14/2018 (A)  Final    Plus  Light growth  Mixed aerobic and anaerobic tiffany  No further identification     08/14/2018 Light growth  Citrobacter freundii complex   (A)  Final   08/14/2018 Light growth  Enterococcus faecium (VRE)   (A)  Final   08/14/2018 Single colony  Klebsiella pneumoniae   (A)  Final   08/14/2018 Light growth  Pseudomonas aeruginosa   (A)  Final    08/14/2018 Plus  Light growth  Mixed gram positive tiffany    Final   08/14/2018   Final    Critical Value/Significant Value, preliminary result only, called to and read back by  Edith PalRN at 1200 on 08.15.18 by mp     08/14/2018   Final    Critical Value/Significant Value called to and read back by  Probable VRE called to Carole Vogt RN at 0953 on 08.16.18 by       Specimen Description   Date Value Ref Range Status   10/05/2018 Blood Left Arm  Final   10/05/2018 Blood Right Hand  Final   10/03/2018 Midstream Urine  Final   09/19/2018 Oral Ulcer  Final   09/19/2018 Oral Ulcer  Final   09/19/2018 Wound Ulcer  Final   09/19/2018 Oral  Final   09/16/2018 Blood Left Hand  Final   09/16/2018 Blood Left Arm  Final   09/02/2018 Blood Right Hand  Final   09/02/2018 Blood Left Hand  Final   08/31/2018 Blood Red LUMEN  Final   08/31/2018 Blood Blue LUMEN  Final   08/31/2018 Blood Right Hand  Final   08/31/2018 Blood Left Hand  Final   08/31/2018 Serum  Final   08/14/2018 Abdominal Fluid SPECIMEN 1  Final   08/14/2018 Abdominal Fluid SPECIMEN 1  Final   08/12/2018 Feces  Final   08/12/2018 Feces  Final   08/12/2018 Feces  Final        Last check of C difficile  C Diff Toxin B PCR   Date Value Ref Range Status   08/12/2018 Positive (A) NEG^Negative Final     Comment:     Positive: Toxin producing Clostridium difficile target DNA sequences detected,   presumed positive for Clostridium difficile toxin B.  Clostridium difficile (Requires Enteric Isolation)  FDA approved assay performed using HQ plus GeneXpert real-time PCR.  Critical Value/Significant Value called to and read back by  LORELEI LARSON RN AT 2100 [id number]       Syphilis Testing    Treponema pallidum Antibody   Date Value Ref Range Status   08/10/2015 Negative NEG Final     Quantiferon testing   Recent Labs   Lab Test  06/14/18   1949  04/16/18   1546  06/07/17   1446  08/10/15   0729   TBRSLT   --   Negative  Negative  Negative   TBAGN   --   0.01  0.00   0.00   AFBSMS  Negative for acid fast bacteria  Assayed at [facility] [address] [place] [address] [phone]   --    --    --      Virology:    CMV viral loads    Recent Labs   Lab Test  11/19/18   0949  11/13/18   0850  11/05/18   0952  11/02/18   0908  10/26/18   0739   CSPEC  Plasma, EDTA anticoagulant  Plasma  Plasma  Plasma, EDTA anticoagulant  Plasma   CMVLOG  Not Calculated  Not Calculated  Not Calculated  Not Calculated  Not Calculated     Log IU/mL of CMVQNT   Date Value Ref Range Status   11/19/2018 Not Calculated <2.1 [Log_IU]/mL Final   11/13/2018 Not Calculated <2.1 [Log_IU]/mL Final   11/05/2018 Not Calculated <2.1 [Log_IU]/mL Final   11/02/2018 Not Calculated <2.1 [Log_IU]/mL Final   10/26/2018 Not Calculated <2.1 [Log_IU]/mL Final   10/19/2018 Not Calculated <2.1 [Log_IU]/mL Final   10/10/2018 Not Calculated <2.1 [Log_IU]/mL Final   10/05/2018 Not Calculated <2.1 [Log_IU]/mL Final   10/03/2018 Not Calculated <2.1 [Log_IU]/mL Final   09/28/2018 Not Calculated <2.1 [Log_IU]/mL Final   09/24/2018 Not Calculated <2.1 [Log_IU]/mL Final   09/18/2018 Not Calculated <2.1 [Log_IU]/mL Final   09/14/2018 Not Calculated <2.1 [Log_IU]/mL Final   09/06/2018 Not Calculated <2.1 [Log_IU]/mL Final   08/30/2018 Not Calculated <2.1 [Log_IU]/mL Final   08/29/2018 Quantity not sufficient <2.1 [Log_IU]/mL Final   08/12/2018 Not Calculated <2.1 [Log_IU]/mL Final   08/08/2018 Not Calculated <2.1 [Log_IU]/mL Final   07/26/2018 Not Calculated <2.1 [Log_IU]/mL Final   07/18/2018 Not Calculated <2.1 [Log_IU]/mL Final   07/10/2018 Not Calculated <2.1 [Log_IU]/mL Final     EBV DNA Copies/mL   Date Value Ref Range Status   10/26/2018 EBV DNA Not Detected EBVNEG^EBV DNA Not Detected [Copies]/mL Final   10/10/2018 EBV DNA Not Detected EBVNEG^EBV DNA Not Detected [Copies]/mL Final   10/05/2018 EBV DNA Not Detected EBVNEG^EBV DNA Not Detected [Copies]/mL Final   08/30/2018 EBV DNA Not Detected EBVNEG^EBV DNA Not  Detected [Copies]/mL Final   07/26/2018 EBV DNA Not Detected EBVNEG^EBV DNA Not Detected [Copies]/mL Final   07/18/2018 EBV DNA Not Detected EBVNEG^EBV DNA Not Detected [Copies]/mL Final     Parvovirus Testing    Recent Labs   Lab Test  07/29/18   1720   PRVG  6.84*   PRVM  0.17   PRVSP  Blood   PRVPC  Not Detected     Adenovirus Testing    Recent Labs   Lab Test  06/28/18   2148   ADENOVIRUSAG  Negative     Hepatitis B Testing     Recent Labs   Lab Test  09/20/18   0733  07/18/18   0841  06/14/18   0705   AUSAB  19.85*   --   66.74*   HBCAB  Nonreactive  Nonreactive   --    HEPBANG  Nonreactive  Nonreactive  Nonreactive     Hepatitis C Antibody   Date Value Ref Range Status   09/20/2018 Nonreactive NR^Nonreactive Final     Comment:     Assay performance characteristics have not been established for newborns,   infants, and children     07/18/2018 Nonreactive NR^Nonreactive Final     Comment:     Assay performance characteristics have not been established for newborns,   infants, and children       CMV Antibody IgG   Date Value Ref Range Status   08/12/2018 0.4 0.0 - 0.8 AI Final     Comment:     Negative  Antibody index (AI) values reflect qualitative changes in antibody   concentration that cannot be directly associated with clinical condition or   disease state.     06/14/2018 0.2 0.0 - 0.8 AI Final     Comment:     Negative  Antibody index (AI) values reflect qualitative changes in antibody   concentration that cannot be directly associated with clinical condition or   disease state.     04/16/2018 <0.2 0.0 - 0.8 AI Final     Comment:     Negative  Antibody index (AI) values reflect qualitative changes in antibody   concentration that cannot be directly associated with clinical condition or   disease state.       CMV Antibody IgM   Date Value Ref Range Status   08/12/2018 <0.2 0.0 - 0.8 AI Final     Comment:     Negative  Antibody index (AI) values reflect qualitative changes in antibody   concentration that  cannot be directly associated with clinical condition or   disease state.       Varicella Zoster Virus Antibody IgG   Date Value Ref Range Status   04/16/2018 5.7 (H) 0.0 - 0.8 AI Final     Comment:     Positive, suggests prev. exposure and probable immunity  Antibody index (AI) values reflect qualitative changes in antibody   concentration that cannot be directly associated with clinical condition or   disease state.     08/10/2015 (H) 0.0 - 0.8 AI Final    >8.0  Positive, suggests prev. exposure and probable immunity   Antibody index (AI) values reflect qualitative changes in antibody   concentration that cannot be directly associated with clinical condition or   disease state.       EBV Capsid Antibody IgG   Date Value Ref Range Status   06/14/2018 >8.0 (H) 0.0 - 0.8 AI Final     Comment:     Positive, suggests recent or past exposure  Antibody index (AI) values reflect qualitative changes in antibody   concentration that cannot be directly associated with clinical condition or   disease state.     04/16/2018 7.1 (H) 0.0 - 0.8 AI Final     Comment:     Positive, suggests recent or past exposure  Antibody index (AI) values reflect qualitative changes in antibody   concentration that cannot be directly associated with clinical condition or   disease state.     08/10/2015 7.2 (H) 0.0 - 0.8 AI Final     Comment:     Positive, suggests recent or past exposure   Antibody index (AI) values reflect qualitative changes in antibody   concentration that cannot be directly associated with clinical condition or   disease state.       Toxoplasma Antibody IgG   Date Value Ref Range Status   04/16/2018 <3.0 0.0 - 7.1 IU/mL Final     Comment:     Negative- Absence of detectable Toxoplasma gondii IgG antibodies. A negative   result does not rule out acute infection.  The magnitude of the measured result is not indicative of the amount of   antibody present. The concentrations of anti-Toxoplasma gondii IgG in a given   specimen  determined with assays from different manufacturers can vary due to   differences in assay methods and reagent specificity.       Herpes Simplex Virus Type 1 IgG   Date Value Ref Range Status   04/16/2018 0.3 0.0 - 0.8 AI Final     Comment:     No HSV-1 IgG antibodies detected.  Antibody index (AI) values reflect qualitative changes in antibody   concentration that cannot be directly associated with clinical condition or   disease state.       Herpes Simplex Virus Type 2 IgG   Date Value Ref Range Status   04/16/2018 >8.0 (H) 0.0 - 0.8 AI Final     Comment:     Positive.  IgG antibody to HSV-2 detected.  Antibody index (AI) values reflect qualitative changes in antibody   concentration that cannot be directly associated with clinical condition or   disease state.       Imaging:  No results found for this or any previous visit (from the past 48 hour(s)).     10/5/18 Facial CT scan:  Mottled appearance and cortical loss of the midline maxillary bone posterior to the incisors. This corresponds to patient's described ulcer changes. MRI can be obtained to further evaluate the bony signal changes.    8/29/18 CT enterography:  1. Redemonstration of the enhancing lesion in the left lower quadrant small bowel measuring up to 1.4 cm. This may represent a small polyp or GIST.  2. Interval improvement in the previously seen dilation of the small bowel. Stable postoperative changes of partial sigmoidectomy and left lower quadrant colostomy.  3. Increased size of a 6 mm right lower lobe pulmonary nodule, which previously measured 3 mm. The relatively short period of time and significant increase in size favors an infectious or inflammatory etiology, however this is not definitive. Additional sub-5 mm pulmonary nodules are unchanged. Recommend three-month follow-up CT.  4. Multiple unchanged intraductal pancreatic mucinous neoplasms.  5. Decreased small left pleural effusion.

## 2018-11-26 NOTE — MR AVS SNAPSHOT
After Visit Summary   11/26/2018    Murray Nicholson    MRN: [id number]           Patient Information     Date Of Birth          1955        Visit Information        Provider Department      11/26/2018 2:45 PM Calin Cheney MD [facility] Solid Organ Transplant        Today's Diagnoses     Encounter regarding vascular access for dialysis for ESRD (H)    -  1       Follow-ups after your visit        Your next 10 appointments already scheduled     Dec 03, 2018  8:00 AM CST   LAB with UU LAB GOLD [facility], Lab ([facility])    [address] [address]              Please do not eat 10-12 hours before your appointment if you are coming in fasting for labs on lipids, cholesterol, or glucose (sugar). This does not apply to pregnant women. Water, hot tea and black coffee (with nothing added) are okay. Do not drink other fluids, diet soda or chew gum.            Dec 03, 2018  8:30 AM CST   Ech [id number]   [facility] ([facility])    [address] [address]   [phone]           1.  Please bring or wear a comfortable two-piece outfit. 2.  You may eat, drink and take your normal medicines. 3.  For any questions that cannot be answered, please contact the ordering physician 4.  Please do not wear perfumes or scented lotions on the day of your exam.            Dec 03, 2018  9:30 AM CST   Procedure - 2.5 hour with U2A ROOM 12   Unit 2A [facility] [place] ([facility])    [address] [facility] [address]               Dec 03, 2018 10:30 AM CST   Cath 90 Minute with [id number]   [facility] ([facility])    [address] [facility] [address]   [phone]            Dec 03, 2018  1:00 PM CST   (Arrive by 12:45 PM)   Return Visit with  Rick Tran MD   [facility] Colon and Rectal Surgery ([facility])    [address]  4th Floor  [facility] [address]   [phone]            Dec 03, 2018  1:30 PM CST   (Arrive by 1:15 PM)   PAC EVALUATION with MAYTE Lopez   [facility] Preoperative Assessment Center ([facility] [place])    [address]  4th Floor  [facility] [id number] [phone]            Dec 03, 2018  3:00 PM CST   (Arrive by 2:45 PM)   SHORT with Eduardo Lea [facility] Medication Therapy Management ([facility] [place])    [address]  3rd Floor  [facility] [address] [phone]            Dec 03, 2018  3:30 PM CST   (Arrive by 3:15 PM)   RETURN HEART TRANSPLANT with Klever Ernst MD   [facility] Heart [facility] ([facility])    [address] [place] [address]  [address]  [facility] [id number] [phone]            Dec 11, 2018   Procedure with Rick Tran MD   [facility], Same Day Surgery (--)    [address] [address] [phone]            Feb 01, 2019  8:30 AM CST   (Arrive by 8:15 AM)   RETURN HEART TRANSPLANT with Cleo Marks NP   [facility] ([facility])    [address] [place] [address]  [address] [place] [id number] [phone]              Who to contact     If you have questions or need follow up information about today's clinic visit or your schedule please contact [facility] SOL[facility] ORGAN TRANSPLANT directly at [phone].  Normal or non-critical lab and imaging results will be communicated to you by MyChart, letter or phone within 4 business days after the clinic has received the results. If you do not hear from us within 7 days, please contact the clinic through MyChart or phone. If you have a critical or abnormal lab result, we will notify you by phone as soon as possible.  Submit refill requests  "through [employer] or call your pharmacy and they will forward the refill request to us. Please allow 3 business days for your refill to be completed.          Additional Information About Your Visit        [facility] Information     [employer] gives you secure access to your electronic health record. If you see a primary care provider, you can also send messages to your care team and make appointments. If you have questions, please call your primary care clinic.  If you do not have a primary care provider, please call [phone] and they will assist you.        Care EveryWhere ID     This is your Care EveryWhere ID. This could be used by other organizations to access your [facility] medical records  [id number]        Your Vitals Were     Pulse Temperature Height Pulse Oximetry BMI (Body Mass Index)       98 97.7  F (36.5  C) 1.753 m (5' 9\") 100% 25.07 kg/m2        Blood Pressure from Last 3 Encounters:   11/26/18 (!) 164/92   11/21/18 148/88   11/16/18 152/85    Weight from Last 3 Encounters:   11/21/18 77 kg (169 lb 12.1 oz)   11/16/18 78.9 kg (174 lb)   11/13/18 78.5 kg (173 lb)              Today, you had the following     No orders found for display         Today's Medication Changes          These changes are accurate as of 11/26/18 11:59 PM.  If you have any questions, ask your nurse or doctor.               These medicines have changed or have updated prescriptions.        Dose/Directions    atorvastatin 40 MG tablet   Commonly known as:  LIPITOR   This may have changed:  additional instructions   Used for:  Heart replaced by transplant (H)        Dose:  40 mg   Take 1 tablet (40 mg) by mouth daily   Quantity:  90 tablet   Refills:  3                Primary Care Provider Office Phone # Fax #    Yahir Turcios [phone]       [address]  [facility] [address]        Goals        Lifestyle    Increase physical activity     Notes - Note created  7/3/2018  1:51 PM by Carrie Tran, RN    Goal " Statement: I will start cardiac rehab  Measure of Success: starts cardiac rehab  Supportive Steps to Achieve: support of RN CC  Barriers: none  Strengths: willingness to participate   Date to Achieve By: 7-15-18          Equal Access to Services     JOHN HAUSER : Hadii aad ku hadroddysona William, guiodda daisy, qaslimta kajose crow, jose mack basiliodorothy way talha ayala. So [facility] [phone].    ATENCIÓN: Si habla español, tiene a ortiz disposición servicios gratuitos de asistencia lingüística. Llame al [phone].    We comply with applicable federal civil rights laws and [place] laws. We do not discriminate on the basis of race, color, national origin, age, disability, sex, sexual orientation, or gender identity.            Thank you!     Thank you for choosing [facility] TRANSPL[facility]  for your care. Our goal is always to provide you with excellent care. Hearing back from our patients is one way we can continue to improve our services. Please take a few minutes to complete the written survey that you may receive in the mail after your visit with us. Thank you!             Your Updated Medication List - Protect others around you: Learn how to safely use, store and throw away your medicines at www.disposemymeds.org.          This list is accurate as of 11/26/18 11:59 PM.  Always use your most recent med list.                   Brand Name Dispense Instructions for use Diagnosis    albuterol 108 (90 Base) MCG/ACT inhaler    PROAIR HFA/PROVENTIL HFA/VENTOLIN HFA     Inhale 2 puffs into the lungs every 4 hours as needed for shortness of breath / dyspnea or wheezing        amLODIPine 10 MG tablet    NORVASC    90 tablet    Take 1 tablet (10 mg) by mouth daily    Hypertension goal BP (blood pressure) < 130/80       aspirin 81 MG tablet    ASA     Take 1 tablet (81 mg) by mouth at bedtime        atorvastatin 40 MG tablet    LIPITOR    90 tablet    Take 1 tablet (40 mg) by mouth daily    Heart replaced by  transplant (H)       biotin 5 MG Caps    BIOTIN 5000    30 capsule    Take 5 mg by mouth daily    Brittle nails       blood glucose monitoring lancets     102 each    Use to test blood sugar 2-3 times daily or as directed.  Ok to substitute alternative if insurance prefers.    Type 2 diabetes mellitus with stage 5 chronic kidney disease not on chronic dialysis, without long-term current use of insulin (H)       blood glucose monitoring test strip    no brand specified    100 each    Use to test blood sugar 2-3 times daily or as directed.    Type 2 diabetes mellitus with stage 5 chronic kidney disease not on chronic dialysis, without long-term current use of insulin (H)       chlorhexidine 0.12 % solution    PERIDEX    900 mL    Swish and spit 15 mLs in mouth 2 times daily    Oral ulceration       ciprofloxacin 500 MG tablet    CIPRO    14 tablet    Take 1 tablet (500 mg) by mouth every 24 hours    Oral ulceration       cloNIDine 0.1 MG tablet    CATAPRES    90 tablet    Take 1 tablet (0.1 mg) by mouth At Bedtime    Hypertension, Heart replaced by transplant (H)       clotrimazole 10 MG kalpana     120 Kalpana    Place 1 Kalpana (10 mg) inside cheek 4 times daily    Candidiasis of mouth       fluticasone 50 MCG/ACT nasal spray    FLONASE    16 g    Spray 1-2 sprays into both nostrils daily    Nasal congestion       gentian violet 1 % solution     30 mL    Take 0.5 mLs by mouth 4 times daily    Oral ulceration       hydrALAZINE 100 MG Tabs tablet    APRESOLINE    90 tablet    Take 1 tablet (100 mg) by mouth every 8 hours    Essential hypertension       insulin aspart 100 UNIT/ML pen    NovoLOG PEN    9 mL    Inject 1-7 Units Subcutaneous 4 times daily (before meals and nightly)    Type 2 diabetes mellitus with diabetic nephropathy, with long-term current use of insulin (H)       * insulin isophane human 100 UNIT/ML injection    HumuLIN N PEN    6 mL    Inject 16 Units Subcutaneous every morning (before breakfast)    Type  2 diabetes mellitus with diabetic nephropathy, with long-term current use of insulin (H)       * insulin isophane human 100 UNIT/ML injection    HumuLIN N PEN    3 mL    Inject 12 Units Subcutaneous daily (with dinner)    Type 2 diabetes mellitus with diabetic nephropathy, with long-term current use of insulin (H)       lisinopril 5 MG tablet    PRINIVIL/ZESTRIL    90 tablet    Take 1 tablet (5 mg) by mouth daily    Hypertension goal BP (blood pressure) < 130/80       loratadine 10 MG tablet    CLARITIN     Take 10 mg by mouth daily Reported on 5/3/2017    Hypertensive cardiopathy, SOB (shortness of breath)       melatonin 1 MG Tabs tablet     120 tablet    Take 2 tablets (2 mg) by mouth nightly as needed for sleep    Heart transplanted (H)       multivitamin 1 MG capsule     120 capsule    Take 1 capsule by mouth daily        mycophenolate 250 MG capsule    GENERIC EQUIVALENT    240 capsule    Take 4 capsules (1,000 mg) by mouth 2 times daily    Heart transplanted (H)       order for DME     1 Units    Equipment being ordered: Commode () Treatment Diagnosis: ESRD on PD Pt has to be connected to PD all night and can not be disconnected, hence impending his mobility to go to the bathroom. At risk for infection if he does not have this equipment.    CKD (chronic kidney disease) stage 5, GFR less than 15 ml/min (H)       pantoprazole 40 MG EC tablet    PROTONIX    60 tablet    Take 1 tablet (40 mg) by mouth 2 times daily (before meals)    Duodenitis       pentamidine 300 MG neb solution    NEBUPENT    300 mg    Inhale 300 mg into the lungs every 28 days Last given 9/19/18    Heart replaced by transplant (H)       polyethylene glycol Packet    MIRALAX/GLYCOLAX    7 packet    Take 17 g by mouth daily as needed for constipation    Constipation, unspecified constipation type       predniSONE 5 MG tablet    DELTASONE    60 tablet    Take 1 tablet (5 mg) by mouth daily    Heart transplanted (H)       rosuvastatin 20 MG  tablet    CRESTOR    30 tablet    Take 1 tablet (20 mg) by mouth daily    Heart transplant recipient (H)       simethicone 80 MG chewable tablet    MYLICON    180 tablet    Take 1 tablet (80 mg) by mouth every 6 hours as needed for cramping    Flatulence, eructation and gas pain       sulfamethoxazole-trimethoprim 400-80 MG per tablet    BACTRIM/SEPTRA    14 tablet    Once per day on Tuesday, Thursday and Saturday. Take after dialysis on dialysis days.    Heart transplanted (H)       tacrolimus 1 MG capsule    GENERIC EQUIVALENT    60 capsule    Take 1 capsule (1 mg) by mouth 2 times daily    Heart transplanted (H)       traMADol 50 MG tablet    ULTRAM    10 tablet    Take 1 tablet (50 mg) by mouth every 12 hours as needed for moderate pain    Moderate pain       triamcinolone 0.1 % ointment    KENALOG    80 g    Apply topically 2 times daily    Xerosis of skin       Urea 40 % Crea     85 g    Externally apply topically daily    Brittle nails       * Notice:  This list has 2 medication(s) that are the same as other medications prescribed for you. Read the directions carefully, and ask your doctor or other care provider to review them with you.

## 2018-11-28 ENCOUNTER — TELEPHONE (OUTPATIENT)
Dept: TRANSPLANT | Facility: CLINIC | Age: 63
End: 2018-11-28

## 2018-11-28 NOTE — TELEPHONE ENCOUNTER
Labs WNL/within pt's baseline.  CMV neg.  Pt verbalized understanding; will repeat labs on 12/3 per primary coordinator instructions.

## 2018-11-29 ENCOUNTER — PRE VISIT (OUTPATIENT)
Dept: TRANSPLANT | Facility: CLINIC | Age: 63
End: 2018-11-29

## 2018-11-29 DIAGNOSIS — Z13.220 ENCOUNTER FOR LIPID SCREENING FOR CARDIOVASCULAR DISEASE: ICD-10-CM

## 2018-11-29 DIAGNOSIS — Z94.1 HEART REPLACED BY TRANSPLANT (H): Primary | ICD-10-CM

## 2018-11-29 DIAGNOSIS — Z13.6 ENCOUNTER FOR LIPID SCREENING FOR CARDIOVASCULAR DISEASE: ICD-10-CM

## 2018-11-29 DIAGNOSIS — Z79.899 ENCOUNTER FOR LONG-TERM (CURRENT) USE OF MEDICATIONS: ICD-10-CM

## 2018-11-29 RX ORDER — LIDOCAINE 40 MG/G
CREAM TOPICAL
Status: CANCELLED | OUTPATIENT
Start: 2018-11-29

## 2018-12-01 NOTE — PROGRESS NOTES
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    INTERVAL HISTORY: Murray has been doing quite well. Denies increased pain, fevers, or chills. Tolerating diet well. Colostomy functioning well. Denies pouching issues. Continues on hemodialysis. He is currently listed for kidney transplantation. Heart TX allograft function seems to be great with no evidence of rejection. Currently on ASA but Eliquis was discontinued by Dr. Ernst. Recent EUA with colonoscopy showed a healed fistulotomy wound and a healthy-appearing 13-cm long Noni's pouch; and a 3 mm pedunculated polyp near the staple line, that was removed with a cold biopsy forceps. Pathology showed hyperplastic polyp.    Recent CT c/a/p showed:    IMPRESSION:   1. Postsurgical changes of sigmoidectomy, left lower quadrant end  colostomy, and small bowel resection without evidence of extraluminal  contrast, fistulous communication, or anastomosis breakdown  2. Stable postsurgical changes of heart transportation.   3. Stable appearance of the pancreatic head and neck IPMN's. Recommend  annual surveillance with MRI.  4. Overall significantly improved appearance of the peribronchial  groundglass opacities throughout the lungs with minimal residual right  upper lobe groundglass opacities. Recommend continued attention on  follow-up.    Physical Examination:  /83 (BP Location: Left arm, Patient Position: Chair, Cuff Size: Adult Regular)  Pulse 104  Ht 5' 9\"  Wt 167 lb 15.9 oz  SpO2 100%  BMI 24.81 kg/m2  In no acute distress.    ASSESSMENT  I thoroughly discussed the risks, benefits, and alternatives of operative treatment and the patient agrees to proceed. Alb 3.2, Prealb 27.    PLAN  1. Schedule for HALS colostomy takedown, " flex sig and possible DLI. Will need PAC, Labs, and MBP w/Abx.     Time spent: 30 minutes.  >50% spent in discussing, counseling and coordinating care.    Rick Tran M.D., M.Sc.     Division of Colon and Rectal Surgery  [facility]    Referring Provider:  Klever Ernst MD     Primary Care Provider:  Yahir Turcios

## 2018-12-03 ENCOUNTER — OFFICE VISIT (OUTPATIENT)
Dept: PHARMACY | Facility: CLINIC | Age: 63
End: 2018-12-03
Payer: COMMERCIAL

## 2018-12-03 ENCOUNTER — OFFICE VISIT (OUTPATIENT)
Dept: SURGERY | Facility: CLINIC | Age: 63
End: 2018-12-03
Payer: MEDICARE

## 2018-12-03 ENCOUNTER — ANESTHESIA EVENT (OUTPATIENT)
Dept: SURGERY | Facility: CLINIC | Age: 63
DRG: 329 | End: 2018-12-03
Payer: MEDICARE

## 2018-12-03 ENCOUNTER — OFFICE VISIT (OUTPATIENT)
Dept: CARDIOLOGY | Facility: CLINIC | Age: 63
End: 2018-12-03
Attending: INTERNAL MEDICINE
Payer: MEDICARE

## 2018-12-03 ENCOUNTER — APPOINTMENT (OUTPATIENT)
Dept: MEDSURG UNIT | Facility: CLINIC | Age: 63
End: 2018-12-03
Attending: INTERNAL MEDICINE
Payer: MEDICARE

## 2018-12-03 ENCOUNTER — HOSPITAL ENCOUNTER (OUTPATIENT)
Facility: CLINIC | Age: 63
Discharge: NURSING, REHAB OR LONG-TERM CARE | End: 2018-12-03
Attending: INTERNAL MEDICINE | Admitting: INTERNAL MEDICINE
Payer: MEDICARE

## 2018-12-03 VITALS
TEMPERATURE: 98.2 F | SYSTOLIC BLOOD PRESSURE: 158 MMHG | OXYGEN SATURATION: 100 % | DIASTOLIC BLOOD PRESSURE: 92 MMHG | HEART RATE: 100 BPM | WEIGHT: 167.55 LBS | RESPIRATION RATE: 20 BRPM | HEIGHT: 69 IN | BODY MASS INDEX: 24.82 KG/M2

## 2018-12-03 VITALS
OXYGEN SATURATION: 100 % | HEIGHT: 69 IN | DIASTOLIC BLOOD PRESSURE: 83 MMHG | SYSTOLIC BLOOD PRESSURE: 131 MMHG | WEIGHT: 167.99 LBS | HEART RATE: 104 BPM | BODY MASS INDEX: 24.88 KG/M2

## 2018-12-03 VITALS
OXYGEN SATURATION: 100 % | HEIGHT: 69 IN | WEIGHT: 167 LBS | BODY MASS INDEX: 24.73 KG/M2 | DIASTOLIC BLOOD PRESSURE: 83 MMHG | HEART RATE: 104 BPM | SYSTOLIC BLOOD PRESSURE: 131 MMHG

## 2018-12-03 VITALS
HEIGHT: 69 IN | DIASTOLIC BLOOD PRESSURE: 83 MMHG | WEIGHT: 167 LBS | BODY MASS INDEX: 24.73 KG/M2 | SYSTOLIC BLOOD PRESSURE: 131 MMHG | HEART RATE: 104 BPM | OXYGEN SATURATION: 100 %

## 2018-12-03 DIAGNOSIS — Z93.3 COLOSTOMY STATUS (H): Primary | ICD-10-CM

## 2018-12-03 DIAGNOSIS — M10.9 GOUT, UNSPECIFIED CAUSE, UNSPECIFIED CHRONICITY, UNSPECIFIED SITE: ICD-10-CM

## 2018-12-03 DIAGNOSIS — Z01.818 PRE-OPERATIVE GENERAL PHYSICAL EXAMINATION: Primary | ICD-10-CM

## 2018-12-03 DIAGNOSIS — E11.22 TYPE 2 DIABETES MELLITUS WITH STAGE 5 CHRONIC KIDNEY DISEASE NOT ON CHRONIC DIALYSIS, WITHOUT LONG-TERM CURRENT USE OF INSULIN (H): Primary | ICD-10-CM

## 2018-12-03 DIAGNOSIS — Z93.3 COLOSTOMY IN PLACE (H): ICD-10-CM

## 2018-12-03 DIAGNOSIS — Z94.1 HEART TRANSPLANTED (H): ICD-10-CM

## 2018-12-03 DIAGNOSIS — Z94.1 HEART REPLACED BY TRANSPLANT (H): ICD-10-CM

## 2018-12-03 DIAGNOSIS — Z13.220 ENCOUNTER FOR LIPID SCREENING FOR CARDIOVASCULAR DISEASE: ICD-10-CM

## 2018-12-03 DIAGNOSIS — K57.81 DIVERTICULITIS OF INTESTINE, PART UNSPECIFIED, WITH PERFORATION AND ABSCESS WITH BLEEDING: ICD-10-CM

## 2018-12-03 DIAGNOSIS — N18.5 TYPE 2 DIABETES MELLITUS WITH STAGE 5 CHRONIC KIDNEY DISEASE NOT ON CHRONIC DIALYSIS, WITHOUT LONG-TERM CURRENT USE OF INSULIN (H): Primary | ICD-10-CM

## 2018-12-03 DIAGNOSIS — M1A.0390 CHRONIC GOUT OF WRIST, UNSPECIFIED CAUSE, UNSPECIFIED LATERALITY: ICD-10-CM

## 2018-12-03 DIAGNOSIS — Z79.899 ENCOUNTER FOR LONG-TERM (CURRENT) USE OF MEDICATIONS: ICD-10-CM

## 2018-12-03 DIAGNOSIS — Z13.6 ENCOUNTER FOR LIPID SCREENING FOR CARDIOVASCULAR DISEASE: ICD-10-CM

## 2018-12-03 LAB
ALBUMIN SERPL-MCNC: 3.2 G/DL (ref 3.4–5)
ALP SERPL-CCNC: 181 U/L (ref 40–150)
ALT SERPL W P-5'-P-CCNC: 19 U/L (ref 0–70)
ANION GAP SERPL CALCULATED.3IONS-SCNC: 11 MMOL/L (ref 3–14)
AST SERPL W P-5'-P-CCNC: 19 U/L (ref 0–45)
BILIRUB SERPL-MCNC: 0.5 MG/DL (ref 0.2–1.3)
BUN SERPL-MCNC: 42 MG/DL (ref 7–30)
CALCIUM SERPL-MCNC: 9.4 MG/DL (ref 8.5–10.1)
CHLORIDE SERPL-SCNC: 103 MMOL/L (ref 94–109)
CHOLEST SERPL-MCNC: 154 MG/DL
CK SERPL-CCNC: 115 U/L (ref 30–300)
CMV DNA SPEC NAA+PROBE-ACNC: NORMAL [IU]/ML
CMV DNA SPEC NAA+PROBE-LOG#: NORMAL {LOG_IU}/ML
CO2 SERPL-SCNC: 24 MMOL/L (ref 20–32)
CREAT SERPL-MCNC: 6.15 MG/DL (ref 0.66–1.25)
CRP SERPL-MCNC: 6.7 MG/L (ref 0–8)
ERYTHROCYTE [DISTWIDTH] IN BLOOD BY AUTOMATED COUNT: 17.4 % (ref 10–15)
GFR SERPL CREATININE-BSD FRML MDRD: 9 ML/MIN/1.7M2
GLUCOSE SERPL-MCNC: 192 MG/DL (ref 70–99)
HBA1C MFR BLD: 6.1 % (ref 0–5.6)
HCT VFR BLD AUTO: 34.8 % (ref 40–53)
HDLC SERPL-MCNC: 65 MG/DL
HGB BLD-MCNC: 10.8 G/DL (ref 13.3–17.7)
LDLC SERPL CALC-MCNC: 49 MG/DL
MAGNESIUM SERPL-MCNC: 1.3 MG/DL (ref 1.6–2.3)
MCH RBC QN AUTO: 28.3 PG (ref 26.5–33)
MCHC RBC AUTO-ENTMCNC: 31 G/DL (ref 31.5–36.5)
MCV RBC AUTO: 91 FL (ref 78–100)
NONHDLC SERPL-MCNC: 89 MG/DL
PHOSPHATE SERPL-MCNC: 3 MG/DL (ref 2.5–4.5)
PLATELET # BLD AUTO: 231 10E9/L (ref 150–450)
POTASSIUM SERPL-SCNC: 4.2 MMOL/L (ref 3.4–5.3)
PROT SERPL-MCNC: 6.8 G/DL (ref 6.8–8.8)
RBC # BLD AUTO: 3.82 10E12/L (ref 4.4–5.9)
SODIUM SERPL-SCNC: 138 MMOL/L (ref 133–144)
SPECIMEN SOURCE: NORMAL
TACROLIMUS BLD-MCNC: <3 UG/L (ref 5–15)
TME LAST DOSE: ABNORMAL H
TRIGL SERPL-MCNC: 201 MG/DL
URATE SERPL-MCNC: 4.1 MG/DL (ref 3.5–7.2)
WBC # BLD AUTO: 5 10E9/L (ref 4–11)

## 2018-12-03 PROCEDURE — G0463 HOSPITAL OUTPT CLINIC VISIT: HCPCS | Mod: ZF

## 2018-12-03 PROCEDURE — 93306 TTE W/DOPPLER COMPLETE: CPT | Mod: 26 | Performed by: INTERNAL MEDICINE

## 2018-12-03 PROCEDURE — 27210982 ZZH KIT RT HC TOTES DISP CR7

## 2018-12-03 PROCEDURE — 88346 IMFLUOR 1ST 1ANTB STAIN PX: CPT | Performed by: INTERNAL MEDICINE

## 2018-12-03 PROCEDURE — 87799 DETECT AGENT NOS DNA QUANT: CPT | Performed by: STUDENT IN AN ORGANIZED HEALTH CARE EDUCATION/TRAINING PROGRAM

## 2018-12-03 PROCEDURE — 80053 COMPREHEN METABOLIC PANEL: CPT | Performed by: STUDENT IN AN ORGANIZED HEALTH CARE EDUCATION/TRAINING PROGRAM

## 2018-12-03 PROCEDURE — 80061 LIPID PANEL: CPT | Performed by: STUDENT IN AN ORGANIZED HEALTH CARE EDUCATION/TRAINING PROGRAM

## 2018-12-03 PROCEDURE — 88350 IMFLUOR EA ADDL 1ANTB STN PX: CPT | Performed by: INTERNAL MEDICINE

## 2018-12-03 PROCEDURE — 93505 ENDOMYOCARDIAL BIOPSY: CPT | Mod: 26 | Performed by: INTERNAL MEDICINE

## 2018-12-03 PROCEDURE — 86140 C-REACTIVE PROTEIN: CPT | Performed by: STUDENT IN AN ORGANIZED HEALTH CARE EDUCATION/TRAINING PROGRAM

## 2018-12-03 PROCEDURE — 84550 ASSAY OF BLOOD/URIC ACID: CPT | Performed by: STUDENT IN AN ORGANIZED HEALTH CARE EDUCATION/TRAINING PROGRAM

## 2018-12-03 PROCEDURE — 99215 OFFICE O/P EST HI 40 MIN: CPT | Mod: ZP | Performed by: INTERNAL MEDICINE

## 2018-12-03 PROCEDURE — 85027 COMPLETE CBC AUTOMATED: CPT | Performed by: STUDENT IN AN ORGANIZED HEALTH CARE EDUCATION/TRAINING PROGRAM

## 2018-12-03 PROCEDURE — C1893 INTRO/SHEATH, FIXED,NON-PEEL: HCPCS

## 2018-12-03 PROCEDURE — 27210787 ZZH MANIFOLD CR2

## 2018-12-03 PROCEDURE — 36415 COLL VENOUS BLD VENIPUNCTURE: CPT | Performed by: STUDENT IN AN ORGANIZED HEALTH CARE EDUCATION/TRAINING PROGRAM

## 2018-12-03 PROCEDURE — 25000125 ZZHC RX 250: Performed by: INTERNAL MEDICINE

## 2018-12-03 PROCEDURE — 27211181 ZZH BALLOON TIP PRESSURE CR5

## 2018-12-03 PROCEDURE — 83735 ASSAY OF MAGNESIUM: CPT | Performed by: STUDENT IN AN ORGANIZED HEALTH CARE EDUCATION/TRAINING PROGRAM

## 2018-12-03 PROCEDURE — 82550 ASSAY OF CK (CPK): CPT | Performed by: STUDENT IN AN ORGANIZED HEALTH CARE EDUCATION/TRAINING PROGRAM

## 2018-12-03 PROCEDURE — 80197 ASSAY OF TACROLIMUS: CPT | Performed by: INTERNAL MEDICINE

## 2018-12-03 PROCEDURE — 93505 ENDOMYOCARDIAL BIOPSY: CPT

## 2018-12-03 PROCEDURE — 88307 TISSUE EXAM BY PATHOLOGIST: CPT | Performed by: INTERNAL MEDICINE

## 2018-12-03 PROCEDURE — 27211047 ZZH FORCEP BIOPSY CR10

## 2018-12-03 PROCEDURE — 86352 CELL FUNCTION ASSAY W/STIM: CPT | Performed by: STUDENT IN AN ORGANIZED HEALTH CARE EDUCATION/TRAINING PROGRAM

## 2018-12-03 PROCEDURE — 40000166 ZZH STATISTIC PP CARE STAGE 1

## 2018-12-03 PROCEDURE — 84100 ASSAY OF PHOSPHORUS: CPT | Performed by: STUDENT IN AN ORGANIZED HEALTH CARE EDUCATION/TRAINING PROGRAM

## 2018-12-03 PROCEDURE — 99606 MTMS BY PHARM EST 15 MIN: CPT | Performed by: PHARMACIST

## 2018-12-03 PROCEDURE — 93306 TTE W/DOPPLER COMPLETE: CPT

## 2018-12-03 PROCEDURE — 83036 HEMOGLOBIN GLYCOSYLATED A1C: CPT | Performed by: STUDENT IN AN ORGANIZED HEALTH CARE EDUCATION/TRAINING PROGRAM

## 2018-12-03 RX ORDER — ONDANSETRON 4 MG/1
4 TABLET, FILM COATED ORAL EVERY 6 HOURS PRN
Qty: 3 TABLET | Refills: 0 | Status: SHIPPED | OUTPATIENT
Start: 2018-12-03 | End: 2019-03-13

## 2018-12-03 RX ORDER — POLYETHYLENE GLYCOL 3350 17 G/17G
238 POWDER, FOR SOLUTION ORAL SEE ADMIN INSTRUCTIONS
Qty: 238 G | Refills: 0 | Status: ON HOLD | OUTPATIENT
Start: 2018-12-03 | End: 2018-12-17

## 2018-12-03 RX ORDER — NEOMYCIN SULFATE 500 MG/1
1000 TABLET ORAL EVERY 8 HOURS
Qty: 6 TABLET | Refills: 0 | Status: SHIPPED | OUTPATIENT
Start: 2018-12-03 | End: 2019-02-25

## 2018-12-03 RX ORDER — LIDOCAINE 40 MG/G
CREAM TOPICAL
Status: COMPLETED | OUTPATIENT
Start: 2018-12-03 | End: 2018-12-03

## 2018-12-03 RX ORDER — TACROLIMUS 1 MG/1
2 CAPSULE ORAL 2 TIMES DAILY
Qty: 120 CAPSULE | Refills: 11 | Status: ON HOLD | OUTPATIENT
Start: 2018-12-03 | End: 2018-12-17

## 2018-12-03 RX ORDER — METRONIDAZOLE 500 MG/1
500 TABLET ORAL EVERY 8 HOURS
Qty: 3 TABLET | Refills: 0 | Status: ON HOLD | OUTPATIENT
Start: 2018-12-03 | End: 2019-04-01

## 2018-12-03 RX ADMIN — LIDOCAINE: 40 CREAM TOPICAL at 09:57

## 2018-12-03 ASSESSMENT — PAIN SCALES - GENERAL
PAINLEVEL: NO PAIN (0)
PAINLEVEL: NO PAIN (0)

## 2018-12-03 NOTE — LETTER
12/3/2018      RE: Murray Nicholson  [address]  [place] [address]       2018     Yahir Turcios MD    [facility]    [address] [place]  [address]       Ana Luisa Mcpherson MD    [facility]    [address] [facility] [address]    [place]  [address]       RE: Murray Nicholson   MRN: [id number]   : 1955      Dear Dr. Turcios and Dr. Mcpherson:      We had the pleasure of seeing Mr. Murray Nicholson for followup in our Cardiac Transplant Clinic at the [facility].  As you know, he is a very pleasant 63-year-old male who underwent orthotopic heart transplantation on 2018.  He is currently listed for kidney transplantation.  He had a very complex postoperative course.  His current problem list includes:     1.  Status post orthotopic heart transplantation.   2.  Neutropenia.   3.  Oral mucosal ulcer secondary to neutropenia with Klebsiella infection.   4.  Pseudomonas bacteremia, resolved.   5.  Perforation of the small bowel, status post small-bowel resection and ileostomy.   6.  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   7.  Hypertension.   8.  Hyperlipidemia.   9.  End-stage renal disease requiring hemodialysis.      He returns today for his 6 month follow up.  He is doing okay.  He has not no specific complaints.  He no longer has fever or chills.  Is tolerating the dialysis without any significant side effects.  His white blood cell has remained stable after restarting CellCept.  He was also seen by transplant infectious disease and he has completed his ciprofloxacin course. His CRP is WNL and his CMV PCR's has been negative. His WBC has remained stable on cellcept 1000 mg bid and prophylactic bactrim dose. He is scheduled for a retake of his colostomy second week of December.       Current Outpatient Prescriptions   Medication Sig     albuterol (PROAIR HFA/PROVENTIL HFA/VENTOLIN HFA) 108 (90 Base) MCG/ACT inhaler Inhale 2 puffs into  the lungs every 4 hours as needed for shortness of breath / dyspnea or wheezing     amLODIPine (NORVASC) 10 MG tablet Take 1 tablet (10 mg) by mouth daily     ASPIRIN 81 MG OR TABS Take 1 tablet (81 mg) by mouth at bedtime     atorvastatin (LIPITOR) 40 MG tablet Take 1 tablet (40 mg) by mouth daily (Patient taking differently: Take 40 mg by mouth daily Takes in the afternoon.)     biotin (BIOTIN 5000) 5 MG CAPS Take 5 mg by mouth daily     blood glucose monitoring (ACCU-CHEK FASTCLIX) lancets Use to test blood sugar 2-3 times daily or as directed.  Ok to substitute alternative if insurance prefers.     blood glucose monitoring (NO BRAND SPECIFIED) test strip Use to test blood sugar 2-3 times daily or as directed.     chlorhexidine (PERIDEX) 0.12 % solution Swish and spit 15 mLs in mouth 2 times daily     cloNIDine (CATAPRES) 0.1 MG tablet Take 1 tablet (0.1 mg) by mouth At Bedtime     clotrimazole 10 MG kalpana Place 1 Kalpana (10 mg) inside cheek 4 times daily     fluticasone (FLONASE) 50 MCG/ACT spray Spray 1-2 sprays into both nostrils daily     gentian violet 1 % solution Take 0.5 mLs by mouth 4 times daily     hydrALAZINE (APRESOLINE) 100 MG TABS tablet Take 1 tablet (100 mg) by mouth every 8 hours     insulin aspart (NOVOLOG PEN) 100 UNIT/ML injection Inject 1-7 Units Subcutaneous 4 times daily (before meals and nightly)     insulin isophane human (HUMULIN N PEN) 100 UNIT/ML injection Inject 16 Units Subcutaneous every morning (before breakfast)     insulin isophane human (HUMULIN N PEN) 100 UNIT/ML injection Inject 12 Units Subcutaneous daily (with dinner)     lisinopril (PRINIVIL/ZESTRIL) 5 MG tablet Take 1 tablet (5 mg) by mouth daily     loratadine (CLARITIN) 10 MG tablet Take 10 mg by mouth daily Reported on 5/3/2017     magnesium citrate solution Take 296 mLs by mouth See Admin Instructions Refer to surgery handout.     melatonin 1 MG TABS tablet Take 2 tablets (2 mg) by mouth nightly as needed for sleep      metroNIDAZOLE (FLAGYL) 500 MG tablet Take 1 tablet (500 mg) by mouth every 8 hours for 1 day prior to surgery.  Take in conjunction with Neomycin Sulfate.     mycophenolate (GENERIC EQUIVALENT) 250 MG capsule Take 4 capsules (1,000 mg) by mouth 2 times daily     neomycin (MYCIFRADIN) 500 MG tablet Take 2 tablets (1,000 mg) by mouth every 8 hours for 1 day prior to surgery.  Take in conjunction with Flagyl.     NEPHROCAPS 1 MG capsule Take 1 capsule by mouth daily     ondansetron (ZOFRAN) 4 MG tablet Take 1 tablet (4 mg) by mouth every 6 hours as needed for nausea While taking neomycin and flagyl.     pantoprazole (PROTONIX) 40 MG EC tablet Take 1 tablet (40 mg) by mouth 2 times daily (before meals)     pentamidine (NEBUPENT) 300 MG neb solution Inhale 300 mg into the lungs every 28 days Last given 9/19/18     polyethylene glycol (MIRALAX) packet Take 238 g by mouth See Admin Instructions One 8.3-ounce bottle (238 g).  Refer to surgery packet.     polyethylene glycol (MIRALAX/GLYCOLAX) Packet Take 17 g by mouth daily as needed for constipation     predniSONE (DELTASONE) 5 MG tablet Take 1 tablet (5 mg) by mouth daily     simethicone (MYLICON) 80 MG chewable tablet Take 1 tablet (80 mg) by mouth every 6 hours as needed for cramping     sulfamethoxazole-trimethoprim (BACTRIM/SEPTRA) 400-80 MG per tablet Once per day on Tuesday, Thursday and Saturday. Take after dialysis on dialysis days.     tacrolimus (GENERIC EQUIVALENT) 1 MG capsule Take 2 capsules (2 mg) by mouth 2 times daily     traMADol (ULTRAM) 50 MG tablet Take 1 tablet (50 mg) by mouth every 12 hours as needed for moderate pain     triamcinolone (KENALOG) 0.1 % ointment Apply topically 2 times daily     Urea 40 % CREA Externally apply topically daily     No current facility-administered medications for this visit.         REVIEW OF SYSTEMS:  A detailed 10-point review of systems was obtained as described in History of Present Illness.  All other systems  "are reviewed and are negative.      PHYSICAL EXAMINATION:   GENERAL:  He was comfortable.  He was in no apparent distress.   /83  Pulse 104  Ht 1.753 m (5' 9\")  Wt 75.8 kg (167 lb)  SpO2 100%  BMI 24.66 kg/m2  He had no pallor or cyanosis or jaundice.  The lesion on his hard palate is healing nicely.  Cardiac auscultation revealed normal S1-S2 with no murmur rub or gallop.  Auscultation of his lungs reveal equal air entry on both sides with no added sounds.  His abdomen was soft with no wounds and no tenderness no rigidity no guarding.  Colostomy site looks good.  He had no focal neurological deficit.  His extremities show no edema.    Testing:     Echo (12/2018):  Global and regional left ventricular function is hyperkinetic with an EF of  65-70%.  Right ventricular function, chamber size, wall motion, and thickness are  normal.  No significant valvular abnormalities.  The peak velocity of the tricuspid regurgitant jet is not obtainable.  The inferior vena cava was normal in size with preserved respiratory  variability.  No pericardial effusion is present.     Compared to prior study dated 09/11/18, no significant interval change.    RHC (12/2018):   RA 2  RV 23/2  PA 23/10 (13)  PCWP 2  PA % 77%  CO/CI 7.9 /4.1  PVR 1.4    Biopsy (12/2018):  FINAL DIAGNOSIS:   Heart, allograft, right ventricle, endomyocardial biopsy:        - Acute cellular rejection: No rejection, Grade 0R (ISHLT 2004)   - Antibody-mediated rejection: No evidence of antibody-mediated rejection   - C3d and C4d are negative (see comment)     DSA:     Lab Results   Component Value Date    SAITESTMET SA FCS 11/13/2018    SAICELL Class I 11/13/2018    [id number] None 11/13/2018    [id number] None 11/13/2018    SAIREPCOM  11/13/2018      Test performed by modified procedure. Serum heat inactivated and tested   by a modified (Garner) protocol including fetal calf serum addition.   High-risk, mfi >3,000. Mod-risk, mfi 500-3,000.       Lab " Results   Component Value Date    SAIITESTME SA FCS 11/13/2018    SAIICELL Class II 11/13/2018    [id number] None 11/13/2018    [id number] None 11/13/2018    SAIIREPCOM  11/13/2018      Test performed by modified procedure. Serum heat inactivated and tested   by a modified (Schofield) protocol including fetal calf serum addition.   High-risk, mfi >3,000. Mod-risk, mfi 500-3,000.         IMMUNOSUPPRESSANT LEVELS:  Lab Results   Component Value Date    TACROL <3.0 (L) 12/03/2018    DOSTAC Not Provided 12/03/2018       Lab Results   Component Value Date    CSPEC Plasma, EDTA anticoagulant 12/03/2018       CBC RESULTS:   Recent Labs   Lab Test  12/03/18   0845   WBC  5.0   RBC  3.82*   HGB  10.8*   HCT  34.8*   MCV  91   MCH  28.3   MCHC  31.0*   RDW  17.4*   PLT  231       Recent Labs   Lab Test  12/03/18   0845  11/26/18   1322   NA  138  137   POTASSIUM  4.2  4.6   CHLORIDE  103  103   CO2  24  23   ANIONGAP  11  10   GLC  192*  184*   BUN  42*  47*   CR  6.15*  6.45*   TRINI  9.4  9.4       Liver Function Studies - Liver Function Studies -   Recent Labs   Lab Test  12/03/18   0845   PROTTOTAL  6.8   ALBUMIN  3.2*   BILITOTAL  0.5   ALKPHOS  181*   AST  19   ALT  19       Recent Labs   Lab Test  12/03/18   0845  09/10/18   0640   07/18/18   0841   08/10/15   0729  04/09/15   0900   CHOL  154   --    --   175   < >  198  182   HDL  65   --    --   92   < >  40*  46   LDL  49   --    --   66   < >  Cannot estimate LDL when triglyceride exceeds 400 mg/dL  Cannot estimate LDL when triglyceride exceeds 400 mg/dL  85   TRIG  201*  71   < >  88   < >  421*  426*   CHOLHDLRATIO   --    --    --    --    --   5.0  4.0    < > = values in this interval not displayed.       No components found for: CK  Lab Results   Component Value Date    MAG 1.3 (L) 12/03/2018     Lab Results   Component Value Date    A1C 6.1 (H) 12/03/2018     Lab Results   Component Value Date    PHOS 3.0 12/03/2018     PSA   Date Value Ref Range Status    04/16/2018 2.90 0 - 4 ug/L Final     Comment:     Assay Method:  Chemiluminescence using Siemens Vista analyzer       CMV PCR - negative  EBV - Negative.     ASSESSMENT AND PLAN:       Mr. Murray Nicholson is a 63-year-old male status post orthotopic heart transplantation in June with a very complex postoperative course who returns today for followup.     1.  Orthotopic heart transplantation.  Mr. Nicholson has normal graft function.  He has not had any evidence of rejection, fortunately, so far.  His biopsies have been negative.  His graft systolic function has been normal.  Will discontinue prednisone if his  biopsy from today is negative and his Prograf is therapeutic.  We will continue him on CellCept to 1000 mg twice a day and tacrolimus with a target goal of 6-8 given his multiple recent infections.  FK dose increased we will recheck level in couple of days. He is on aspirin and lipitor for primary prevention of allograft vasculopathy.     2.  Hypertension.  His blood pressure is currently controlled on hydralazine 100 mg 3 times a day, lisinopril 5 mg, Norvasc 10 mg, and Clonidine 0.1 mg daily.     3.  End-stage renal disease.  He continues to remain on dialysis 3 times a week.  He has been tolerating it with no problems.  His dry weight has recently been increased.  With that, he is not having any lightheadedness or dizziness.  He is getting vein mapping in anticipation of an AV fistula placement.    4.  Neutropenia - resolved. On low-dose of CellCept.  He remains of Valcyte, which he no longer requires as per the protocol . Will discontinue bactrim when we stop his prednisone.       5.  High risk for CMV.  He is off of Valcyte secondary to neutropenia.  Will check CMV as per protocol. He has completed 6 months of close survelliance.     6.  Oral ulcer with Klebsiella - completed extended course of Cipro. Healing well. His CRP is within normal limits. Will schedule follow-up appointment with ENT.  Will schedule  follow-up appointment with infectious disease as needed.  He is on the chlorhexidine swish and swallow twice a day.      7.  Perforated bowel, status post colostomy, doing well, tolerating diet, normal ostomy site output.  Plan for takedown of his colostomy second week of December. Will plan for intravenous tacrolimus and Cellcept if he is not able to tolerate PO during the daniella-operative period.       8.  Diabetes.  He is on NPH insulin.  He also uses NovoLog as needed with a sliding scale.       RTC as per protocol. He will call in the interim if he has any worsening symptoms.       Sincerely,   Klever Ernst MD   [facility]  Heart Failure, Transplant, and Mechanical Circulatory Support Cardiology   Cardiovascular Division  [facility]   [phone]           Klever Ernst MD

## 2018-12-03 NOTE — DISCHARGE INSTRUCTIONS
[facility]                        Interventional Cardiology  Discharge Instructions   Post Right Heart Cath      AFTER YOU GO HOME:    DO drink plenty of fluids    DO resume your regular diet and medications unless otherwise instructed by your Primary Physician    Do Not scrub the procedure site vigorously    No lotion or powder to the puncture site for 3 days    CALL YOUR PRIMARY PHYSICIAN IF: You may resume all normal activity.  Monitor neck site for bleeding, swelling, or voice changes. If you notice bleeding or swelling immediately apply pressure to the site and call number below to speak with Cardiology Fellow.  If you experience any changes in your breathing you should call your doctor immediately or come to the closest Emergency Department.  Do not drive yourself.    ADDITIONAL INSTRUCTIONS: Medications: You are to resume all home medications including anticoagulation therapy unless otherwise advised by your primary cardiologist or nurse coordinator.    Follow Up: Per your primary cardiology team    If you have any questions or concerns regarding your procedure site please call [phone] at anytime and ask for Cardiology Fellow on call.  They are available 24 hours a day.  You may also contact the Cardiology Clinic after hours number at [phone].                                                       Telephone Numbers [phone] Monday-Friday 8:00 am to 4:30 pm    [phone] After 4:30 pm Monday-Friday, Weekends & Holidays  Ask for Interventional Cardiologist on call. Someone is on call 24 hours/day   [facility] toll free number [phone] Monday-Friday 8:00 am to 4:30 pm   [facility] Emergency Dept [phone]

## 2018-12-03 NOTE — MR AVS SNAPSHOT
After Visit Summary   12/3/2018    Murray Nicholson    MRN: [id number]           Patient Information     Date Of Birth          1955        Visit Information        Provider Department      12/3/2018 3:30 PM Klever Ernst MD [facility]        Today's Diagnoses     Heart transplanted (H)          Care Instructions    Please call your coordinator at [phone] with any questions or concerns.      Increase the Prograf to 2 mg twice daily and recheck labs THIS Friday 12/7 at 8AM. 12-hour trough on Prograf.      Coordinator will call with biopsy results.      will arrange appt with ENT (Sarmad)                Follow-ups after your visit        Your next 10 appointments already scheduled     Dec 11, 2018   Procedure with Rick Tran MD   [facility], [place], Same Day Surgery (--)    [address] [facility] [address]   [phone]            Dec 21, 2018  9:00 AM CST   TELEMEDICINE with Eduardo Lea [facility] ([facility])    [address] [place] [address]  [address] [facility] [id number] [phone]           Note: this is not an onsite visit; there is no need to come to the facility.            Feb 01, 2019  8:30 AM CST   (Arrive by 8:15 AM)   RETURN HEART TRANSPLANT with Cleo Marks NP   [facility] ([facility])    [address] [place] [address]  [address] [place] [id number] [phone]            Jorge 10, 2019  3:00 PM CDT   (Arrive by 2:45 PM)   HEART TRANSPLANT ANNUAL with Klever Ernst MD   [facility] ([facility])    [address] [place] [address]  [address] [place] [id number] [phone]              Future tests that were ordered for you today     Open Future Orders        Priority Expected Expires Ordered    CRP inflammation Routine 12/7/2018 12/28/2018 12/3/2018    Tacrolimus level Routine 12/7/2018 12/28/2018  "12/3/2018    Basic metabolic panel Routine 12/7/2018 12/28/2018 12/3/2018    CBC with platelets Routine  3/1/2019 12/1/2018    Comprehensive metabolic panel Routine  3/1/2019 12/1/2018    Prealbumin Routine  3/1/2019 12/1/2018            Who to contact     If you have questions or need follow up information about today's clinic visit or your schedule please contact [facility] directly at [phone].  Normal or non-critical lab and imaging results will be communicated to you by Socogamehart, letter or phone within 4 business days after the clinic has received the results. If you do not hear from us within 7 days, please contact the clinic through Mooltat or phone. If you have a critical or abnormal lab result, we will notify you by phone as soon as possible.  Submit refill requests through [facility] or call your pharmacy and they will forward the refill request to us. Please allow 3 business days for your refill to be completed.          Additional Information About Your Visit        [facility] Information     [facility] gives you secure access to your electronic health record. If you see a primary care provider, you can also send messages to your care team and make appointments. If you have questions, please call your primary care clinic.  If you do not have a primary care provider, please call [phone] and they will assist you.        Care EveryWhere ID     This is your Care EveryWhere ID. This could be used by other organizations to access your [facility] medical records  [id number]        Your Vitals Were     Pulse Height Pulse Oximetry BMI (Body Mass Index)          104 1.753 m (5' 9\") 100% 24.66 kg/m2         Blood Pressure from Last 3 Encounters:   12/03/18 131/83   12/03/18 131/83   12/03/18 131/83    Weight from Last 3 Encounters:   12/03/18 75.8 kg (167 lb)   12/03/18 75.8 kg (167 lb)   12/03/18 76.2 kg (167 lb 15.9 oz)                 Today's Medication Changes          These changes are accurate as of " 12/3/18  4:44 PM.  If you have any questions, ask your nurse or doctor.               Start taking these medicines.        Dose/Directions    magnesium citrate solution   Used for:  Diverticulitis of intestine, part unspecified, with perforation and abscess with bleeding   Started by:  Rick Tran MD        Dose:  296 mL   Take 296 mLs by mouth See Admin Instructions Refer to surgery handout.   Quantity:  296 mL   Refills:  0       metroNIDAZOLE 500 MG tablet   Commonly known as:  FLAGYL   Used for:  Diverticulitis of intestine, part unspecified, with perforation and abscess with bleeding   Started by:  Rick Tran MD        Dose:  500 mg   Take 1 tablet (500 mg) by mouth every 8 hours for 1 day prior to surgery.  Take in conjunction with Neomycin Sulfate.   Quantity:  3 tablet   Refills:  0       neomycin 500 MG tablet   Commonly known as:  MYCIFRADIN   Used for:  Diverticulitis of intestine, part unspecified, with perforation and abscess with bleeding   Started by:  Rick Tran MD        Dose:  1000 mg   Take 2 tablets (1,000 mg) by mouth every 8 hours for 1 day prior to surgery.  Take in conjunction with Flagyl.   Quantity:  6 tablet   Refills:  0       ondansetron 4 MG tablet   Commonly known as:  ZOFRAN   Used for:  Diverticulitis of intestine, part unspecified, with perforation and abscess with bleeding   Started by:  Rick Tran MD        Dose:  4 mg   Take 1 tablet (4 mg) by mouth every 6 hours as needed for nausea While taking neomycin and flagyl.   Quantity:  3 tablet   Refills:  0         These medicines have changed or have updated prescriptions.        Dose/Directions    atorvastatin 40 MG tablet   Commonly known as:  LIPITOR   This may have changed:  additional instructions   Used for:  Heart replaced by transplant (H)        Dose:  40 mg   Take 1 tablet (40 mg) by mouth daily   Quantity:  90 tablet   Refills:  3       * polyethylene glycol packet    Commonly known as:  MIRALAX/GLYCOLAX   This may have changed:  Another medication with the same name was added. Make sure you understand how and when to take each.   Used for:  Constipation, unspecified constipation type   Changed by:  Rick Tran MD        Dose:  17 g   Take 17 g by mouth daily as needed for constipation   Quantity:  7 packet   Refills:  0       * polyethylene glycol packet   Commonly known as:  MIRALAX   This may have changed:  You were already taking a medication with the same name, and this prescription was added. Make sure you understand how and when to take each.   Used for:  Diverticulitis of intestine, part unspecified, with perforation and abscess with bleeding   Changed by:  Rick Tran MD        Dose:  238 g   Take 238 g by mouth See Admin Instructions One 8.3-ounce bottle (238 g).  Refer to surgery packet.   Quantity:  238 g   Refills:  0       tacrolimus 1 MG capsule   Commonly known as:  GENERIC EQUIVALENT   This may have changed:  how much to take   Used for:  Heart transplanted (H)   Changed by:  Klever Ernst MD        Dose:  2 mg   Take 2 capsules (2 mg) by mouth 2 times daily   Quantity:  120 capsule   Refills:  11       * Notice:  This list has 2 medication(s) that are the same as other medications prescribed for you. Read the directions carefully, and ask your doctor or other care provider to review them with you.         Where to get your medicines      These medications were sent to [facility] [place], [place] - [address] [facility]  [address] [facility] [address]     Phone:  [phone]     tacrolimus 1 MG capsule         These medications were sent to [id number] - [place], [place] - [address], [place] [address]     Phone:  [phone]     magnesium citrate solution    metroNIDAZOLE 500 MG tablet    neomycin 500 MG tablet    ondansetron  4 MG tablet    polyethylene glycol packet                Primary Care Provider Office Phone # Fax #    Yahir Alex Turcios [phone]       [address]  [facility] [address]        Goals        Lifestyle    Increase physical activity     Notes - Note created  7/3/2018  1:51 PM by Carrie Tran RN    Goal Statement: I will start cardiac rehab  Measure of Success: starts cardiac rehab  Supportive Steps to Achieve: support of RN CC  Barriers: none  Strengths: willingness to participate   Date to Achieve By: 7-15-18          Equal Access to Services     [facility]: Hadii aad ku hadasho Soomaali, waaxda luqadaha, qaybta kaalmada adeegyada, waxay idiin hayaan adeeg kharash laRachaelaan . So [facility] [phone].    ATENCIÓN: Si habla español, tiene a ortiz disposición servicios gratuitos de asistencia lingüística. Ll[facility] [phone].    We comply with applicable federal civil rights laws and [place] laws. We do not discriminate on the basis of race, color, national origin, age, disability, sex, sexual orientation, or gender identity.            Thank you!     Thank you for choosing [facility]  for your care. Our goal is always to provide you with excellent care. Hearing back from our patients is one way we can continue to improve our services. Please take a few minutes to complete the written survey that you may receive in the mail after your visit with us. Thank you!             Your Updated Medication List - Protect others around you: Learn how to safely use, store and throw away your medicines at www.disposemymeds.org.          This list is accurate as of 12/3/18  4:44 PM.  Always use your most recent med list.                   Brand Name Dispense Instructions for use Diagnosis    albuterol 108 (90 Base) MCG/ACT inhaler    PROAIR HFA/PROVENTIL HFA/VENTOLIN HFA     Inhale 2 puffs into the lungs every 4 hours as needed for shortness of breath / dyspnea or wheezing        amLODIPine 10 MG  tablet    NORVASC    90 tablet    Take 1 tablet (10 mg) by mouth daily    Hypertension goal BP (blood pressure) < 130/80       aspirin 81 MG tablet    ASA     Take 1 tablet (81 mg) by mouth at bedtime        atorvastatin 40 MG tablet    LIPITOR    90 tablet    Take 1 tablet (40 mg) by mouth daily    Heart replaced by transplant (H)       biotin 5 MG Caps    BIOTIN 5000    30 capsule    Take 5 mg by mouth daily    Brittle nails       blood glucose monitoring lancets     102 each    Use to test blood sugar 2-3 times daily or as directed.  Ok to substitute alternative if insurance prefers.    Type 2 diabetes mellitus with stage 5 chronic kidney disease not on chronic dialysis, without long-term current use of insulin (H)       blood glucose monitoring test strip    NO BRAND SPECIFIED    100 each    Use to test blood sugar 2-3 times daily or as directed.    Type 2 diabetes mellitus with stage 5 chronic kidney disease not on chronic dialysis, without long-term current use of insulin (H)       chlorhexidine 0.12 % solution    PERIDEX    900 mL    Swish and spit 15 mLs in mouth 2 times daily    Oral ulceration       cloNIDine 0.1 MG tablet    CATAPRES    90 tablet    Take 1 tablet (0.1 mg) by mouth At Bedtime    Hypertension, Heart replaced by transplant (H)       clotrimazole 10 MG kalpana     120 Kalpana    Place 1 Kalpana (10 mg) inside cheek 4 times daily    Candidiasis of mouth       fluticasone 50 MCG/ACT nasal spray    FLONASE    16 g    Spray 1-2 sprays into both nostrils daily    Nasal congestion       gentian violet 1 % external solution     30 mL    Take 0.5 mLs by mouth 4 times daily    Oral ulceration       hydrALAZINE 100 MG tablet    APRESOLINE    90 tablet    Take 1 tablet (100 mg) by mouth every 8 hours    Essential hypertension       insulin aspart 100 UNIT/ML pen    NovoLOG PEN    9 mL    Inject 1-7 Units Subcutaneous 4 times daily (before meals and nightly)    Type 2 diabetes mellitus with diabetic  nephropathy, with long-term current use of insulin (H)       * insulin isophane human 100 UNIT/ML injection    HumuLIN N PEN    6 mL    Inject 16 Units Subcutaneous every morning (before breakfast)    Type 2 diabetes mellitus with diabetic nephropathy, with long-term current use of insulin (H)       * insulin isophane human 100 UNIT/ML injection    HumuLIN N PEN    3 mL    Inject 12 Units Subcutaneous daily (with dinner)    Type 2 diabetes mellitus with diabetic nephropathy, with long-term current use of insulin (H)       lisinopril 5 MG tablet    PRINIVIL/ZESTRIL    90 tablet    Take 1 tablet (5 mg) by mouth daily    Hypertension goal BP (blood pressure) < 130/80       loratadine 10 MG tablet    CLARITIN     Take 10 mg by mouth daily Reported on 5/3/2017    Hypertensive cardiopathy, SOB (shortness of breath)       magnesium citrate solution     296 mL    Take 296 mLs by mouth See Admin Instructions Refer to surgery handout.    Diverticulitis of intestine, part unspecified, with perforation and abscess with bleeding       melatonin 1 MG Tabs tablet     120 tablet    Take 2 tablets (2 mg) by mouth nightly as needed for sleep    Heart transplanted (H)       metroNIDAZOLE 500 MG tablet    FLAGYL    3 tablet    Take 1 tablet (500 mg) by mouth every 8 hours for 1 day prior to surgery.  Take in conjunction with Neomycin Sulfate.    Diverticulitis of intestine, part unspecified, with perforation and abscess with bleeding       multivitamin 1 MG capsule     120 capsule    Take 1 capsule by mouth daily        mycophenolate 250 MG capsule    GENERIC EQUIVALENT    240 capsule    Take 4 capsules (1,000 mg) by mouth 2 times daily    Heart transplanted (H)       neomycin 500 MG tablet    MYCIFRADIN    6 tablet    Take 2 tablets (1,000 mg) by mouth every 8 hours for 1 day prior to surgery.  Take in conjunction with Flagyl.    Diverticulitis of intestine, part unspecified, with perforation and abscess with bleeding        ondansetron 4 MG tablet    ZOFRAN    3 tablet    Take 1 tablet (4 mg) by mouth every 6 hours as needed for nausea While taking neomycin and flagyl.    Diverticulitis of intestine, part unspecified, with perforation and abscess with bleeding       pantoprazole 40 MG EC tablet    PROTONIX    60 tablet    Take 1 tablet (40 mg) by mouth 2 times daily (before meals)    Duodenitis       pentamidine 300 MG neb solution    NEBUPENT    300 mg    Inhale 300 mg into the lungs every 28 days Last given 9/19/18    Heart replaced by transplant (H)       * polyethylene glycol packet    MIRALAX/GLYCOLAX    7 packet    Take 17 g by mouth daily as needed for constipation    Constipation, unspecified constipation type       * polyethylene glycol packet    MIRALAX    238 g    Take 238 g by mouth See Admin Instructions One 8.3-ounce bottle (238 g).  Refer to surgery packet.    Diverticulitis of intestine, part unspecified, with perforation and abscess with bleeding       predniSONE 5 MG tablet    DELTASONE    60 tablet    Take 1 tablet (5 mg) by mouth daily    Heart transplanted (H)       simethicone 80 MG chewable tablet    MYLICON    180 tablet    Take 1 tablet (80 mg) by mouth every 6 hours as needed for cramping    Flatulence, eructation and gas pain       sulfamethoxazole-trimethoprim 400-80 MG tablet    BACTRIM/SEPTRA    14 tablet    Once per day on Tuesday, Thursday and Saturday. Take after dialysis on dialysis days.    Heart transplanted (H)       tacrolimus 1 MG capsule    GENERIC EQUIVALENT    120 capsule    Take 2 capsules (2 mg) by mouth 2 times daily    Heart transplanted (H)       traMADol 50 MG tablet    ULTRAM    10 tablet    Take 1 tablet (50 mg) by mouth every 12 hours as needed for moderate pain    Moderate pain       triamcinolone 0.1 % external ointment    KENALOG    80 g    Apply topically 2 times daily    Xerosis of skin       Urea 40 % Crea     85 g    Externally apply topically daily    Brittle nails       *  Notice:  This list has 4 medication(s) that are the same as other medications prescribed for you. Read the directions carefully, and ask your doctor or other care provider to review them with you.

## 2018-12-03 NOTE — LETTER
12/3/2018      RE: Murray Nicholson  [address]  [place] [address]       Dear Colleague,    Thank you for the opportunity to participate in the care of your patient, Murray Nicholson, at the [facility]. Please see a copy of my visit note below.    2018     Yahir Turcios MD    [facility]    [address] [place] [address]  [id number]       Ana Luisa Mcpherson MD    [facility]    [address] [place] [address], [facility] [address]    [place]  [address]       RE: Murray Nicholson   MRN: [id number]   : 1955      Dear Dr. Turcios and Dr. Mcpherson:      We had the pleasure of seeing Mr. Murray Nicholson for followup in our Cardiac Transplant Clinic at the [facility].  As you know, he is a very pleasant 63-year-old male who underwent orthotopic heart transplantation on 2018.  He is currently listed for kidney transplantation.  He had a very complex postoperative course.  His current problem list includes:     1.  Status post orthotopic heart transplantation.   2.  Neutropenia.   3.  Oral mucosal ulcer secondary to neutropenia with Klebsiella infection.   4.  Pseudomonas bacteremia, resolved.   5.  Perforation of the small bowel, status post small-bowel resection and ileostomy.   6.  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   7.  Hypertension.   8.  Hyperlipidemia.   9.  End-stage renal disease requiring hemodialysis.      He returns today for his 6 month follow up.  He is doing okay.  He has not no specific complaints.  He no longer has fever or chills.  Is tolerating the dialysis without any significant side effects.  His white blood cell has remained stable after restarting CellCept.  He was also seen by transplant infectious disease and he has completed his ciprofloxacin course. His CRP is WNL and his CMV PCR's has been negative. His WBC has remained stable on cellcept 1000 mg bid and prophylactic bactrim dose.  He is scheduled for a retake of his colostomy second week of December.       Current Outpatient Prescriptions   Medication Sig     albuterol (PROAIR HFA/PROVENTIL HFA/VENTOLIN HFA) 108 (90 Base) MCG/ACT inhaler Inhale 2 puffs into the lungs every 4 hours as needed for shortness of breath / dyspnea or wheezing     amLODIPine (NORVASC) 10 MG tablet Take 1 tablet (10 mg) by mouth daily     ASPIRIN 81 MG OR TABS Take 1 tablet (81 mg) by mouth at bedtime     atorvastatin (LIPITOR) 40 MG tablet Take 1 tablet (40 mg) by mouth daily (Patient taking differently: Take 40 mg by mouth daily Takes in the afternoon.)     biotin (BIOTIN 5000) 5 MG CAPS Take 5 mg by mouth daily     blood glucose monitoring (ACCU-CHEK FASTCLIX) lancets Use to test blood sugar 2-3 times daily or as directed.  Ok to substitute alternative if insurance prefers.     blood glucose monitoring (NO BRAND SPECIFIED) test strip Use to test blood sugar 2-3 times daily or as directed.     chlorhexidine (PERIDEX) 0.12 % solution Swish and spit 15 mLs in mouth 2 times daily     cloNIDine (CATAPRES) 0.1 MG tablet Take 1 tablet (0.1 mg) by mouth At Bedtime     clotrimazole 10 MG kalpana Place 1 Kalpana (10 mg) inside cheek 4 times daily     fluticasone (FLONASE) 50 MCG/ACT spray Spray 1-2 sprays into both nostrils daily     gentian violet 1 % solution Take 0.5 mLs by mouth 4 times daily     hydrALAZINE (APRESOLINE) 100 MG TABS tablet Take 1 tablet (100 mg) by mouth every 8 hours     insulin aspart (NOVOLOG PEN) 100 UNIT/ML injection Inject 1-7 Units Subcutaneous 4 times daily (before meals and nightly)     insulin isophane human (HUMULIN N PEN) 100 UNIT/ML injection Inject 16 Units Subcutaneous every morning (before breakfast)     insulin isophane human (HUMULIN N PEN) 100 UNIT/ML injection Inject 12 Units Subcutaneous daily (with dinner)     lisinopril (PRINIVIL/ZESTRIL) 5 MG tablet Take 1 tablet (5 mg) by mouth daily     loratadine (CLARITIN) 10 MG tablet Take 10  mg by mouth daily Reported on 5/3/2017     magnesium citrate solution Take 296 mLs by mouth See Admin Instructions Refer to surgery handout.     melatonin 1 MG TABS tablet Take 2 tablets (2 mg) by mouth nightly as needed for sleep     metroNIDAZOLE (FLAGYL) 500 MG tablet Take 1 tablet (500 mg) by mouth every 8 hours for 1 day prior to surgery.  Take in conjunction with Neomycin Sulfate.     mycophenolate (GENERIC EQUIVALENT) 250 MG capsule Take 4 capsules (1,000 mg) by mouth 2 times daily     neomycin (MYCIFRADIN) 500 MG tablet Take 2 tablets (1,000 mg) by mouth every 8 hours for 1 day prior to surgery.  Take in conjunction with Flagyl.     NEPHROCAPS 1 MG capsule Take 1 capsule by mouth daily     ondansetron (ZOFRAN) 4 MG tablet Take 1 tablet (4 mg) by mouth every 6 hours as needed for nausea While taking neomycin and flagyl.     pantoprazole (PROTONIX) 40 MG EC tablet Take 1 tablet (40 mg) by mouth 2 times daily (before meals)     pentamidine (NEBUPENT) 300 MG neb solution Inhale 300 mg into the lungs every 28 days Last given 9/19/18     polyethylene glycol (MIRALAX) packet Take 238 g by mouth See Admin Instructions One 8.3-ounce bottle (238 g).  Refer to surgery packet.     polyethylene glycol (MIRALAX/GLYCOLAX) Packet Take 17 g by mouth daily as needed for constipation     predniSONE (DELTASONE) 5 MG tablet Take 1 tablet (5 mg) by mouth daily     simethicone (MYLICON) 80 MG chewable tablet Take 1 tablet (80 mg) by mouth every 6 hours as needed for cramping     sulfamethoxazole-trimethoprim (BACTRIM/SEPTRA) 400-80 MG per tablet Once per day on Tuesday, Thursday and Saturday. Take after dialysis on dialysis days.     tacrolimus (GENERIC EQUIVALENT) 1 MG capsule Take 2 capsules (2 mg) by mouth 2 times daily     traMADol (ULTRAM) 50 MG tablet Take 1 tablet (50 mg) by mouth every 12 hours as needed for moderate pain     triamcinolone (KENALOG) 0.1 % ointment Apply topically 2 times daily     Urea 40 % CREA Externally  "apply topically daily     No current facility-administered medications for this visit.         REVIEW OF SYSTEMS:  A detailed 10-point review of systems was obtained as described in History of Present Illness.  All other systems are reviewed and are negative.      PHYSICAL EXAMINATION:   GENERAL:  He was comfortable.  He was in no apparent distress.   /83  Pulse 104  Ht 1.753 m (5' 9\")  Wt 75.8 kg (167 lb)  SpO2 100%  BMI 24.66 kg/m2  He had no pallor or cyanosis or jaundice.  The lesion on his hard palate is healing nicely.  Cardiac auscultation revealed normal S1-S2 with no murmur rub or gallop.  Auscultation of his lungs reveal equal air entry on both sides with no added sounds.  His abdomen was soft with no wounds and no tenderness no rigidity no guarding.  Colostomy site looks good.  He had no focal neurological deficit.  His extremities show no edema.    Testing:     Echo (12/2018):  Global and regional left ventricular function is hyperkinetic with an EF of  65-70%.  Right ventricular function, chamber size, wall motion, and thickness are  normal.  No significant valvular abnormalities.  The peak velocity of the tricuspid regurgitant jet is not obtainable.  The inferior vena cava was normal in size with preserved respiratory  variability.  No pericardial effusion is present.     Compared to prior study dated 09/11/18, no significant interval change.    RHC (12/2018):   RA 2  RV 23/2  PA 23/10 (13)  PCWP 2  PA % 77%  CO/CI 7.9 /4.1  PVR 1.4    Biopsy (12/2018):  FINAL DIAGNOSIS:   Heart, allograft, right ventricle, endomyocardial biopsy:        - Acute cellular rejection: No rejection, Grade 0R (ISHLT 2004)   - Antibody-mediated rejection: No evidence of antibody-mediated rejection   - C3d and C4d are negative (see comment)     DSA:     Lab Results   Component Value Date    SAITESTMET SA FCS 11/13/2018    SAICELL Class I 11/13/2018    [id number] None 11/13/2018    [id number] None 11/13/2018    " SAIREPCOM  11/13/2018      Test performed by modified procedure. Serum heat inactivated and tested   by a modified (Gorman) protocol including fetal calf serum addition.   High-risk, mfi >3,000. Mod-risk, mfi 500-3,000.       Lab Results   Component Value Date    SAIITESTME SA FCS 11/13/2018    SAIICELL Class II 11/13/2018    [id number] None 11/13/2018    [id number] None 11/13/2018    SAIIREPCOM  11/13/2018      Test performed by modified procedure. Serum heat inactivated and tested   by a modified (Gorman) protocol including fetal calf serum addition.   High-risk, mfi >3,000. Mod-risk, mfi 500-3,000.         IMMUNOSUPPRESSANT LEVELS:  Lab Results   Component Value Date    TACROL <3.0 (L) 12/03/2018    DOSTAC Not Provided 12/03/2018       Lab Results   Component Value Date    CSPEC Plasma, EDTA anticoagulant 12/03/2018       CBC RESULTS:   Recent Labs   Lab Test  12/03/18   0845   WBC  5.0   RBC  3.82*   HGB  10.8*   HCT  34.8*   MCV  91   MCH  28.3   MCHC  31.0*   RDW  17.4*   PLT  231       Recent Labs   Lab Test  12/03/18   0845  11/26/18   1322   NA  138  137   POTASSIUM  4.2  4.6   CHLORIDE  103  103   CO2  24  23   ANIONGAP  11  10   GLC  192*  184*   BUN  42*  47*   CR  6.15*  6.45*   TRINI  9.4  9.4       Liver Function Studies - Liver Function Studies -   Recent Labs   Lab Test  12/03/18   0845   PROTTOTAL  6.8   ALBUMIN  3.2*   BILITOTAL  0.5   ALKPHOS  181*   AST  19   ALT  19       Recent Labs   Lab Test  12/03/18   0845  09/10/18   0640   07/18/18   0841   08/10/15   0729  04/09/15   0900   CHOL  154   --    --   175   < >  198  182   HDL  65   --    --   92   < >  40*  46   LDL  49   --    --   66   < >  Cannot estimate LDL when triglyceride exceeds 400 mg/dL  Cannot estimate LDL when triglyceride exceeds 400 mg/dL  85   TRIG  201*  71   < >  88   < >  421*  426*   CHOLHDLRATIO   --    --    --    --    --   5.0  4.0    < > = values in this interval not displayed.       No components found for: CK  Lab  Results   Component Value Date    MAG 1.3 (L) 12/03/2018     Lab Results   Component Value Date    A1C 6.1 (H) 12/03/2018     Lab Results   Component Value Date    PHOS 3.0 12/03/2018     PSA   Date Value Ref Range Status   04/16/2018 2.90 0 - 4 ug/L Final     Comment:     Assay Method:  Chemiluminescence using Siemens Vista analyzer       CMV PCR - negative  EBV - Negative.     ASSESSMENT AND PLAN:       Mr. Murray Nicholson is a 63-year-old male status post orthotopic heart transplantation in June with a very complex postoperative course who returns today for followup.     1.  Orthotopic heart transplantation.  Mr. Nicholson has normal graft function.  He has not had any evidence of rejection, fortunately, so far.  His biopsies have been negative.  His graft systolic function has been normal.  Will discontinue prednisone if his  biopsy from today is negative and his Prograf is therapeutic.  We will continue him on CellCept to 1000 mg twice a day and tacrolimus with a target goal of 6-8 given his multiple recent infections.  FK dose increased we will recheck level in couple of days. He is on aspirin and lipitor for primary prevention of allograft vasculopathy.     2.  Hypertension.  His blood pressure is currently controlled on hydralazine 100 mg 3 times a day, lisinopril 5 mg, Norvasc 10 mg, and Clonidine 0.1 mg daily.     3.  End-stage renal disease.  He continues to remain on dialysis 3 times a week.  He has been tolerating it with no problems.  His dry weight has recently been increased.  With that, he is not having any lightheadedness or dizziness.  He is getting vein mapping in anticipation of an AV fistula placement.    4.  Neutropenia - resolved. On low-dose of CellCept.  He remains of Valcyte, which he no longer requires as per the protocol . Will discontinue bactrim when we stop his prednisone.       5.  High risk for CMV.  He is off of Valcyte secondary to neutropenia.  Will check CMV as per protocol. He has  completed 6 months of close survelliance.     6.  Oral ulcer with Klebsiella - completed extended course of Cipro. Healing well. His CRP is within normal limits. Will schedule follow-up appointment with ENT.  Will schedule follow-up appointment with infectious disease as needed.  He is on the chlorhexidine swish and swallow twice a day.      7.  Perforated bowel, status post colostomy, doing well, tolerating diet, normal ostomy site output.  Plan for takedown of his colostomy second week of December. Will plan for intravenous tacrolimus and Cellcept if he is not able to tolerate PO during the daniella-operative period.       8.  Diabetes.  He is on NPH insulin.  He also uses NovoLog as needed with a sliding scale.       RTC as per protocol. He will call in the interim if he has any worsening symptoms.       Sincerely,   Klever Ernst MD   [facility]  Heart Failure, Transplant, and Mechanical Circulatory Support Cardiology   Cardiovascular Division  [facility]   [phone]           Please do not hesitate to contact me if you have any questions/concerns.     Sincerely,     Klever Ernst MD

## 2018-12-03 NOTE — IP AVS SNAPSHOT
MRN:[id number]                      After Visit Summary   12/3/2018    Murray Nicholson    MRN: [id number]           Visit Information        Department      12/3/2018  9:21 AM [address] [facility]          Review of your medicines      UNREVIEWED medicines. Ask your doctor about these medicines        Dose / Directions    albuterol 108 (90 Base) MCG/ACT inhaler   Commonly known as:  PROAIR HFA/PROVENTIL HFA/VENTOLIN HFA        Dose:  2 puff   Inhale 2 puffs into the lungs every 4 hours as needed for shortness of breath / dyspnea or wheezing   Refills:  0       amLODIPine 10 MG tablet   Commonly known as:  NORVASC   Used for:  Hypertension goal BP (blood pressure) < 130/80        Dose:  10 mg   Take 1 tablet (10 mg) by mouth daily   Quantity:  90 tablet   Refills:  3       aspirin 81 MG tablet   Commonly known as:  ASA        Take 1 tablet (81 mg) by mouth at bedtime   Refills:  0       atorvastatin 40 MG tablet   Commonly known as:  LIPITOR   Used for:  Heart replaced by transplant (H)        Dose:  40 mg   Take 1 tablet (40 mg) by mouth daily   Quantity:  90 tablet   Refills:  3       biotin 5 MG Caps   Commonly known as:  BIOTIN 5000   Used for:  Brittle nails        Dose:  5 mg   Take 5 mg by mouth daily   Quantity:  30 capsule   Refills:  3       chlorhexidine 0.12 % solution   Commonly known as:  PERIDEX   Used for:  Oral ulceration        Dose:  15 mL   Swish and spit 15 mLs in mouth 2 times daily   Quantity:  900 mL   Refills:  0       ciprofloxacin 500 MG tablet   Commonly known as:  CIPRO   Indication:  Infection of the Skin and/or Related Soft Tissue, oral ulceration   Used for:  Oral ulceration        Dose:  500 mg   Take 1 tablet (500 mg) by mouth every 24 hours   Quantity:  14 tablet   Refills:  0       cloNIDine 0.1 MG tablet   Commonly known as:  CATAPRES   Used for:  Hypertension, Heart replaced by transplant (H)        Dose:  0.1 mg   Take 1 tablet (0.1 mg) by mouth At Bedtime    Quantity:  90 tablet   Refills:  3       clotrimazole 10 MG kalpana   Used for:  Candidiasis of mouth        Dose:  1 Kalpana   Place 1 Kalpana (10 mg) inside cheek 4 times daily   Quantity:  120 Kalpana   Refills:  11       fluticasone 50 MCG/ACT nasal spray   Commonly known as:  FLONASE   Used for:  Nasal congestion        Dose:  1-2 spray   Spray 1-2 sprays into both nostrils daily   Quantity:  16 g   Refills:  11       gentian violet 1 % external solution   Used for:  Oral ulceration        Dose:  0.5 mL   Take 0.5 mLs by mouth 4 times daily   Quantity:  30 mL   Refills:  1       hydrALAZINE 100 MG tablet   Commonly known as:  APRESOLINE   Used for:  Essential hypertension        Dose:  100 mg   Take 1 tablet (100 mg) by mouth every 8 hours   Quantity:  90 tablet   Refills:  11       insulin aspart 100 UNIT/ML pen   Commonly known as:  NovoLOG PEN   Used for:  Type 2 diabetes mellitus with diabetic nephropathy, with long-term current use of insulin (H)        Dose:  1-7 Units   Inject 1-7 Units Subcutaneous 4 times daily (before meals and nightly)   Quantity:  9 mL   Refills:  3       * insulin isophane human 100 UNIT/ML injection   Commonly known as:  HumuLIN N PEN   Indication:  Type 2 Diabetes   Used for:  Type 2 diabetes mellitus with diabetic nephropathy, with long-term current use of insulin (H)        Dose:  16 Units   Inject 16 Units Subcutaneous every morning (before breakfast)   Quantity:  6 mL   Refills:  11       * insulin isophane human 100 UNIT/ML injection   Commonly known as:  HumuLIN N PEN   Indication:  Type 2 Diabetes   Used for:  Type 2 diabetes mellitus with diabetic nephropathy, with long-term current use of insulin (H)        Dose:  12 Units   Inject 12 Units Subcutaneous daily (with dinner)   Quantity:  3 mL   Refills:  11       lisinopril 5 MG tablet   Commonly known as:  PRINIVIL/ZESTRIL   Indication:  High Blood Pressure Disorder   Used for:  Hypertension goal BP (blood pressure) <  130/80        Dose:  5 mg   Take 1 tablet (5 mg) by mouth daily   Quantity:  90 tablet   Refills:  3       loratadine 10 MG tablet   Commonly known as:  CLARITIN   Used for:  Hypertensive cardiopathy, SOB (shortness of breath)        Dose:  10 mg   Take 10 mg by mouth daily Reported on 5/3/2017   Refills:  0       melatonin 1 MG Tabs tablet   Used for:  Heart transplanted (H)        Dose:  2 mg   Take 2 tablets (2 mg) by mouth nightly as needed for sleep   Quantity:  120 tablet   Refills:  1       multivitamin 1 MG capsule        Dose:  1 capsule   Take 1 capsule by mouth daily   Quantity:  120 capsule   Refills:  0       mycophenolate 250 MG capsule   Commonly known as:  GENERIC EQUIVALENT   Used for:  Heart transplanted (H)        Dose:  1000 mg   Take 4 capsules (1,000 mg) by mouth 2 times daily   Quantity:  240 capsule   Refills:  11       pantoprazole 40 MG EC tablet   Commonly known as:  PROTONIX   Indication:  GI prophylaxis   Used for:  Duodenitis        Dose:  40 mg   Take 1 tablet (40 mg) by mouth 2 times daily (before meals)   Quantity:  60 tablet   Refills:  11       pentamidine 300 MG neb solution   Commonly known as:  NEBUPENT   Indication:  PJP prophylaxis post OHT   Used for:  Heart replaced by transplant (H)        Dose:  300 mg   Inhale 300 mg into the lungs every 28 days Last given 9/19/18   Quantity:  300 mg   Refills:  0       polyethylene glycol packet   Commonly known as:  MIRALAX/GLYCOLAX   Used for:  Constipation, unspecified constipation type        Dose:  17 g   Take 17 g by mouth daily as needed for constipation   Quantity:  7 packet   Refills:  0       predniSONE 5 MG tablet   Commonly known as:  DELTASONE   Indication:  Immunosupressant/S/p heart transplant   Used for:  Heart transplanted (H)        Dose:  5 mg   Take 1 tablet (5 mg) by mouth daily   Quantity:  60 tablet   Refills:  0       rosuvastatin 20 MG tablet   Commonly known as:  CRESTOR   Used for:  Heart transplant recipient  (H)        Dose:  20 mg   Take 1 tablet (20 mg) by mouth daily   Quantity:  30 tablet   Refills:  1       simethicone 80 MG chewable tablet   Commonly known as:  MYLICON   Used for:  Flatulence, eructation and gas pain        Dose:  80 mg   Take 1 tablet (80 mg) by mouth every 6 hours as needed for cramping   Quantity:  180 tablet   Refills:  0       sulfamethoxazole-trimethoprim 400-80 MG tablet   Commonly known as:  BACTRIM/SEPTRA   Used for:  Heart transplanted (H)        Once per day on Tuesday, Thursday and Saturday. Take after dialysis on dialysis days.   Quantity:  14 tablet   Refills:  3       tacrolimus 1 MG capsule   Commonly known as:  GENERIC EQUIVALENT   Used for:  Heart transplanted (H)        Dose:  1 mg   Take 1 capsule (1 mg) by mouth 2 times daily   Quantity:  60 capsule   Refills:  0       traMADol 50 MG tablet   Commonly known as:  ULTRAM   Used for:  Moderate pain        Dose:  50 mg   Take 1 tablet (50 mg) by mouth every 12 hours as needed for moderate pain   Quantity:  10 tablet   Refills:  0       triamcinolone 0.1 % external ointment   Commonly known as:  KENALOG   Used for:  Xerosis of skin        Apply topically 2 times daily   Quantity:  80 g   Refills:  0       Urea 40 % Crea   Used for:  Brittle nails        Externally apply topically daily   Quantity:  85 g   Refills:  1       * Notice:  This list has 2 medication(s) that are the same as other medications prescribed for you. Read the directions carefully, and ask your doctor or other care provider to review them with you.      CONTINUE these medicines which have NOT CHANGED        Dose / Directions    blood glucose monitoring lancets   Used for:  Type 2 diabetes mellitus with stage 5 chronic kidney disease not on chronic dialysis, without long-term current use of insulin (H)        Use to test blood sugar 2-3 times daily or as directed.  Ok to substitute alternative if insurance prefers.   Quantity:  102 each   Refills:  11       blood  glucose monitoring test strip   Commonly known as:  NO BRAND SPECIFIED   Used for:  Type 2 diabetes mellitus with stage 5 chronic kidney disease not on chronic dialysis, without long-term current use of insulin (H)        Use to test blood sugar 2-3 times daily or as directed.   Quantity:  100 each   Refills:  11       order for DME   Used for:  CKD (chronic kidney disease) stage 5, GFR less than 15 ml/min (H)        Equipment being ordered: Commode () Treatment Diagnosis: ESRD on PD Pt has to be connected to PD all night and can not be disconnected, hence impending his mobility to go to the bathroom. At risk for infection if he does not have this equipment.   Quantity:  1 Units   Refills:  0                Protect others around you: Learn how to safely use, store and throw away your medicines at www.disposemymeds.org.         Follow-ups after your visit        Your next 10 appointments already scheduled     Dec 03, 2018 10:30 AM CST   Cath 90 Minute [id number]   [facility])    [address] [place] [id number]   [phone]            Dec 03, 2018  1:00 PM CST   (Arrive by 12:45 PM)   Return Visit with Rick Tran MD   [facility] Colon and Rectal Surgery ([facility])    [address] [place] [place] [address]   [phone]            Dec 03, 2018  1:30 PM CST   (Arrive by 1:15 PM)   PAC EVALUATION with MAYTE Lopez   [facility] Preoperative Assessment Center ([facility])    [address] [place] [place] [address]   [phone]            Dec 03, 2018  3:00 PM CST   (Arrive by 2:45 PM)   SHORT with Eduardo Lea RPH   [facility] Medication Therapy Management ([facility])    [address] [place] [address]   [phone]            Dec 03, 2018  3:30 PM  CST   (Arrive by 3:15 PM)   RETURN HEART TRANSPLANT with Klever Ernst MD   [facility] ([facility])    [address]  [address]  [facility] [address]   [phone]            Dec 11, 2018   Procedure with Rick Tran MD   [facility] Same Day Surgery (--)    [address]  [facility] [place] [address]   [phone]            Feb 01, 2019  8:30 AM CST   (Arrive by 8:15 AM)   RETURN HEART TRANSPLANT with Cleo Marks NP   [facility] ([facility])    [address] [place] [address]  [address]  [facility] [address]   [phone]            Jorge 10, 2019  3:00 PM CDT   (Arrive by 2:45 PM)   HEART TRANSPLANT ANNUAL with Klever Ernst MD   [facility] ([facility])    [address] [place] [address]  [address]  [facility] [address]   [phone]               Care Instructions        Further instructions from your care team       [facility]                        Interventional Cardiology  Discharge Instructions   Post Right Heart Cath      AFTER YOU GO HOME:    DO drink plenty of fluids    DO resume your regular diet and medications unless otherwise instructed by your Primary Physician    Do Not scrub the procedure site vigorously    No lotion or powder to the puncture site for 3 days    CALL YOUR PRIMARY PHYSICIAN IF: You may resume all normal activity.  Monitor neck site for bleeding, swelling, or voice changes. If you notice bleeding or swelling immediately apply pressure to the site and call number below to speak with Cardiology Fellow.  If you experience any changes in your breathing you should call your doctor immediately or come to the closest Emergency Department.  Do not drive yourself.    ADDITIONAL INSTRUCTIONS: Medications: You are to resume all home medications including anticoagulation therapy unless otherwise advised by your primary cardiologist or nurse  coordinator.    Follow Up: Per your primary cardiology team    If you have any questions or concerns regarding your procedure site please call [phone] at anytime and ask for Cardiology Fellow on call.  They are available 24 hours a day.  You may also contact the Cardiology Clinic after hours number at [phone].                                                       Telephone Numbers [phone] Monday-Friday 8:00 am to 4:30 pm    [phone] After 4:30 pm Monday-Friday, Weekends & Holidays  Ask for Interventional Cardiologist on call. Someone is on call 24 hours/day   [facility] toll free number [phone] Monday-Friday 8:00 am to 4:30 pm   [facility] Emergency Dept [phone]                    Additional Information About Your Visit        MyChart Information     [facility] gives you secure access to your electronic health record. If you see a primary care provider, you can also send messages to your care team and make appointments. If you have questions, please call your primary care clinic.  If you do not have a primary care provider, please call [phone] and they will assist you.        Care EveryWhere ID     This is your Care EveryWhere ID. This could be used by other organizations to access your [facility] medical records  [id number]         Primary Care Provider Office Phone # Fax #    Yahir Turcios [phone]      Equal Access to Services     JOHN HAUSER : Hadii marsha bonnero Sotracey, waaxda luqadaha, qaybta kaaljose hendricks. So [facility] [phone].    ATENCIÓN: Si habla español, tiene a ortiz disposición servicios gratuitos de asistencia lingüística. Llame al [phone].    We comply with applicable federal civil rights laws and [place] laws. We do not discriminate on the basis of race, color, national origin, age, disability, sex, sexual orientation, or gender identity.            Thank you!     Thank you for choosing  [facility] for your care. Our goal is always to provide you with excellent care. Hearing back from our patients is one way we can continue to improve our services. Please take a few minutes to complete the written survey that you may receive in the mail after you visit with us. Thank you!             Medication List: This is a list of all your medications and when to take them. Check marks below indicate your daily home schedule. Keep this list as a reference.      Medications           Morning Afternoon Evening Bedtime As Needed    albuterol 108 (90 Base) MCG/ACT inhaler   Commonly known as:  PROAIR HFA/PROVENTIL HFA/VENTOLIN HFA   Inhale 2 puffs into the lungs every 4 hours as needed for shortness of breath / dyspnea or wheezing                                amLODIPine 10 MG tablet   Commonly known as:  NORVASC   Take 1 tablet (10 mg) by mouth daily                                aspirin 81 MG tablet   Commonly known as:  ASA   Take 1 tablet (81 mg) by mouth at bedtime                                atorvastatin 40 MG tablet   Commonly known as:  LIPITOR   Take 1 tablet (40 mg) by mouth daily                                biotin 5 MG Caps   Commonly known as:  BIOTIN 5000   Take 5 mg by mouth daily                                blood glucose monitoring lancets   Use to test blood sugar 2-3 times daily or as directed.  Ok to substitute alternative if insurance prefers.                                blood glucose monitoring test strip   Commonly known as:  NO BRAND SPECIFIED   Use to test blood sugar 2-3 times daily or as directed.                                chlorhexidine 0.12 % solution   Commonly known as:  PERIDEX   Swish and spit 15 mLs in mouth 2 times daily                                ciprofloxacin 500 MG tablet   Commonly known as:  CIPRO   Take 1 tablet (500 mg) by mouth every 24 hours                                cloNIDine 0.1 MG tablet   Commonly known as:  CATAPRES   Take 1 tablet (0.1 mg) by  mouth At Bedtime                                clotrimazole 10 MG kalpana   Place 1 Kalpana (10 mg) inside cheek 4 times daily                                fluticasone 50 MCG/ACT nasal spray   Commonly known as:  FLONASE   Spray 1-2 sprays into both nostrils daily                                gentian violet 1 % external solution   Take 0.5 mLs by mouth 4 times daily                                hydrALAZINE 100 MG tablet   Commonly known as:  APRESOLINE   Take 1 tablet (100 mg) by mouth every 8 hours                                insulin aspart 100 UNIT/ML pen   Commonly known as:  NovoLOG PEN   Inject 1-7 Units Subcutaneous 4 times daily (before meals and nightly)                                * insulin isophane human 100 UNIT/ML injection   Commonly known as:  HumuLIN N PEN   Inject 16 Units Subcutaneous every morning (before breakfast)                                * insulin isophane human 100 UNIT/ML injection   Commonly known as:  HumuLIN N PEN   Inject 12 Units Subcutaneous daily (with dinner)                                lisinopril 5 MG tablet   Commonly known as:  PRINIVIL/ZESTRIL   Take 1 tablet (5 mg) by mouth daily                                loratadine 10 MG tablet   Commonly known as:  CLARITIN   Take 10 mg by mouth daily Reported on 5/3/2017                                melatonin 1 MG Tabs tablet   Take 2 tablets (2 mg) by mouth nightly as needed for sleep                                multivitamin 1 MG capsule   Take 1 capsule by mouth daily                                mycophenolate 250 MG capsule   Commonly known as:  GENERIC EQUIVALENT   Take 4 capsules (1,000 mg) by mouth 2 times daily                                order for DME   Equipment being ordered: Carol () Treatment Diagnosis: ESRD on PD Pt has to be connected to PD all night and can not be disconnected, hence impending his mobility to go to the bathroom. At risk for infection if he does not have this equipment.                                 pantoprazole 40 MG EC tablet   Commonly known as:  PROTONIX   Take 1 tablet (40 mg) by mouth 2 times daily (before meals)                                pentamidine 300 MG neb solution   Commonly known as:  NEBUPENT   Inhale 300 mg into the lungs every 28 days Last given 9/19/18                                polyethylene glycol packet   Commonly known as:  MIRALAX/GLYCOLAX   Take 17 g by mouth daily as needed for constipation                                predniSONE 5 MG tablet   Commonly known as:  DELTASONE   Take 1 tablet (5 mg) by mouth daily                                rosuvastatin 20 MG tablet   Commonly known as:  CRESTOR   Take 1 tablet (20 mg) by mouth daily                                simethicone 80 MG chewable tablet   Commonly known as:  MYLICON   Take 1 tablet (80 mg) by mouth every 6 hours as needed for cramping                                sulfamethoxazole-trimethoprim 400-80 MG tablet   Commonly known as:  BACTRIM/SEPTRA   Once per day on Tuesday, Thursday and Saturday. Take after dialysis on dialysis days.                                tacrolimus 1 MG capsule   Commonly known as:  GENERIC EQUIVALENT   Take 1 capsule (1 mg) by mouth 2 times daily                                traMADol 50 MG tablet   Commonly known as:  ULTRAM   Take 1 tablet (50 mg) by mouth every 12 hours as needed for moderate pain                                triamcinolone 0.1 % external ointment   Commonly known as:  KENALOG   Apply topically 2 times daily                                Urea 40 % Crea   Externally apply topically daily                                * Notice:  This list has 2 medication(s) that are the same as other medications prescribed for you. Read the directions carefully, and ask your doctor or other care provider to review them with you.

## 2018-12-03 NOTE — PATIENT INSTRUCTIONS
Preparing for Your Surgery      Name:  Murray Nicholson   MRN:  [id number]   :  1955   Today's Date:  12/3/2018     Arriving for surgery:  Surgery date:  18  Arrival time:  06:15 am  Please come to:       [facility] [address]  [address] [place]  [address]    -   parking is available in front of the hospital from 5:15 am to 8:00 pm    -  Stop at the Information Desk in the lobby    -   Inform the information person that you are here for surgery. An escort to 3c will be provided. If you would not like an escort, please proceed to 3C on the 3rd floor. [phone]     What can I eat or drink?  -  You may have solid food or milk products - see instructions per service for bowel prep.  Printed and given to patient.  -  You may have water, apple juice or 7up/Sprite until 3 hours prior to your surgery.    Which medicines can I take?  Stop Aspirin, vitamins and supplements one week prior to surgery.  Hold Ibuprofen and Naproxen for 24 hours prior to surgery.   -  Do NOT take these medications in the morning, the day of surgery:  aspart insulin + lisinopril if normally taken in the morning.    -  Please take these medications the day of surgery:  Tylenol if needed; take all other scheduled medications normally taken in the morning. Take 10 units of humulin N the morning of surgery.    How do I prepare myself?  -  Take two showers: one the night before surgery; and one the morning of surgery.         Use Scrubcare or Hibiclens to wash from neck down.  You may use your own     shampoo and conditioner. No other hair products.   -  Do NOT use lotion, powder, deodorant, or antiperspirant the day of your surgery.  -  Do NOT wear any jewelry.  - Do not bring your own medications to the hospital, except for inhalers and eye  drops.  -  Bring your ID and insurance card.    Questions or Concerns:  -If you have questions or concerns regarding the day of surgery, please call  [phone].     -If you are scheduled at the Ambulatory Surgery Center please call [phone].    -For questions after surgery please call your surgeons office.

## 2018-12-03 NOTE — MR AVS SNAPSHOT
After Visit Summary   12/3/2018    Murray Nicholson    MRN: [id number]           Patient Information     Date Of Birth          1955        Visit Information        Provider Department      12/3/2018 1:00 PM Rick Tran MD [facility] Colon and Rectal Surgery        Today's Diagnoses     Colostomy status (H)    -  1    Diverticulitis of intestine, part unspecified, with perforation and abscess with bleeding           Care Instructions    Follow up:  1. Continue with Surgery on 12/11/18    Please call with any questions or concerns regarding your clinic visit today.    It is a pleasure being involved in your health care.    Contacts post-consultation depending on your need:    Radiology Appointments                [phone]    Schedule Clinic Appointments       [phone] # 1   M-F 7:30 - 5 pm    Sunita LEZAMA RN Coordinator           [phone]    Clinic Fax Number          [phone]    My Chart is available 24 hours a day and is a secure way to access your records and communicate with your care team.  I strongly recommend signing up if you haven't already done so, if you are comfortable with computers.  If you would like to inquire about this or are having problems with My Chart access, you may call [phone] or go online at [email or web address].  Please allow at least 24 hours for a response and extra time on weekends and Holidays.      MIRALAX/GATORADE    FIVE DAYS BEFORE    -Stop taking any of the following over-the-counter medications: Ibuprofen, Asprin,Iron supplements.    -If you are taking a blood thinner medication such as Coumadin, Plavix, or Warfarin, you MUST contact the prescribing physician regarding clearance for this procedure, or instructions for stopping/changing this medication before your procedure. -Please contact the nurse line at [phone] option #3 for any questions regarding other medications.       THREE DAYS BEFORE     Begin restricted  low-residue diet.  Suggested foods include: white bread or rolls, plain crackers, white rice, skinless potatoes, low fiber cereals, chicken, turkey, fish or seafood, and eggs.  You may also have applesauce, pear sauce, soft honeydew, cantaloupe, ripe banana, canned fruit without seeds or skins.      Do not eat: Corn (any form), raw vegetables, foods with seeds, whole wheat or grain breads and cereals, brown or wild rice, granola, raisins and dried fruit, berries, popcorn, all varieties of nuts, peanuts, and seeds.     Please go to your local pharmacy or store and purchase:    - 8.3 oz bottle of  Miralax (Powder)  - 10 oz bottle of Magnesium Citrate  -  64 oz of Gatorade (no red or purple)    THE DAY BEFORE YOUR PROCEDURE    - Begin allowed clear liquid diet at breakfast time.    - Mix Miralax and 64 oz. of Gatorade in a pitcher, and place in the fridge.      ALLOWED CLEAR LIQUIDS  -Water    -Regular or decaf coffee (no cream)    -Jell-O or popsicles (no red)    -Plain hard candy (no red)    -Clear juices or Gatorade (no red or purple)    -Chicken broth (no vegetables or noodles)    DO NOT DRINK  -Milk or mild products such as ice cream, malts, or shakes    -Red juices of any kind    -Santiago-aid, cranberry, or grape juice    -Juice with pulp (orange, grapefruit, pineapple,, or tomato)    -Cream soups of any kind    -Alcoholic beverages    ANTIBIOTICS    8:00 A.M.- Take one tab of Metronidazole (flagyle) and two tabs of Neomycin with one tab of ondansetron (zofran). Please take the ondansetron with the antibiotics as they can cause nausea.    2:00 P.M.- Take one tab of Metronidazole (flagyle) and two tabs of Neomycin with one tab of ondansetron (zofran). Please take the ondansetron with the antibiotics as they can cause nausea.    8:00 P.M.- Take one tab of Metronidazole (flagyle) and two tabs of Neomycin with one tab of ondansetron (zofran). Please take the ondansetron with the antibiotics as they can cause  nausea.    4:00 P.M.- 6:00 P.M.-  Drink one eight (8) ounce glass of Miralax/Gatorade mixture every 30 minutes until the mixture is gone.  If you start to feel nauseous, you may space out your drinking intervals to every 45 minutes    8:00 P.M.- 10:00 P.M.- Drink the bottle of Magnesium Citrate.    You may have clear liquids up to 3 hours before procedure time              Follow-ups after your visit        Your next 10 appointments already scheduled     Dec 03, 2018  3:00 PM CST   (Arrive by 2:45 PM)   SHORT with Eduardo Lea [facility] Medication Therapy Management ([facility])    [address] [place] [address]  3rd Floor  [facility] [address]   [phone]            Dec 03, 2018  3:30 PM CST   (Arrive by 3:15 PM)   RETURN HEART TRANSPLANT with Klever Ernst MD   [facility] ([facility])    [address] [place] [address]  [address] [place] [address]   [phone]            Dec 11, 2018   Procedure with Rick Tran MD   [facility] [place], Same Day Surgery (--)    [address] [facility] [address]   [phone]            Feb 01, 2019  8:30 AM CST   (Arrive by 8:15 AM)   RETURN HEART TRANSPLANT with Cleo Marks NP   [facility] ([facility])    [address] [place] [address]  [address] [place] [address]   [phone]            Jorge 10, 2019  3:00 PM CDT   (Arrive by 2:45 PM)   HEART TRANSPLANT ANNUAL with Klever Ernst MD   [facility] ([facility])    [address] [place] [address]  [address] [place] [address]   [phone]              Future tests that were ordered for you today     Open Future Orders        Priority Expected Expires Ordered    CBC with platelets Routine  3/1/2019 12/1/2018    Comprehensive metabolic panel Routine  3/1/2019 12/1/2018    Prealbumin Routine  3/1/2019 12/1/2018            Who to contact     Please call your clinic at  "[phone] to:    Ask questions about your health    Make or cancel appointments    Discuss your medicines    Learn about your test results    Speak to your doctor            Additional Information About Your Visit        [employer] Information     [employer] gives you secure access to your electronic health record. If you see a primary care provider, you can also send messages to your care team and make appointments. If you have questions, please call your primary care clinic.  If you do not have a primary care provider, please call [phone] and they will assist you.      [employer] is an electronic gateway that provides easy, online access to your medical records. With [employer], you can request a clinic appointment, read your test results, renew a prescription or communicate with your care team.     To access your existing account, please contact your [facility] or call [phone] for assistance.        Care EveryWhere ID     This is your Care EveryWhere ID. This could be used by other organizations to access your [facility] medical records  [id number]        Your Vitals Were     Pulse Height Pulse Oximetry BMI (Body Mass Index)          104 5' 9\" 100% 24.81 kg/m2         Blood Pressure from Last 3 Encounters:   12/03/18 131/83   12/03/18 (!) 158/92   11/26/18 (!) 164/92    Weight from Last 3 Encounters:   12/03/18 167 lb 15.9 oz   12/03/18 167 lb 8.8 oz   11/21/18 169 lb 12.1 oz                 Today's Medication Changes          These changes are accurate as of 12/3/18  1:34 PM.  If you have any questions, ask your nurse or doctor.               These medicines have changed or have updated prescriptions.        Dose/Directions    atorvastatin 40 MG tablet   Commonly known as:  LIPITOR   This may have changed:  additional instructions   Used for:  Heart replaced by transplant (H)        Dose:  40 mg   Take 1 tablet (40 mg) by mouth daily   Quantity:  90 tablet   Refills:  3          "       Primary Care Provider Office Phone # Fax #    Yahir Alex Turcios [phone]       [address]  [facility] [address]        Goals        Lifestyle    Increase physical activity     Notes - Note created  7/3/2018  1:51 PM by Carrie Tran, RN    Goal Statement: I will start cardiac rehab  Measure of Success: starts cardiac rehab  Supportive Steps to Achieve: support of RN CC  Barriers: none  Strengths: willingness to participate   Date to Achieve By: 7-15-18          Equal Access to Services     JOHN HAUSER AH: Hadii aad ku hadasho Soomaali, waaxda luqadaha, qaybta kaalmada adeegyada, waxay idiin hayaan adeeg kharash la'aan . So [facility] [phone].    ATENCIÓN: Si habla español, tiene a ortiz disposición servicios gratuitos de asistencia lingüística. Llame al [phone].    We comply with applicable federal civil rights laws and [place] laws. We do not discriminate on the basis of race, color, national origin, age, disability, sex, sexual orientation, or gender identity.            Thank you!     Thank you for choosing [facility] COLON AND RECTAL SURGERY  for your care. Our goal is always to provide you with excellent care. Hearing back from our patients is one way we can continue to improve our services. Please take a few minutes to complete the written survey that you may receive in the mail after your visit with us. Thank you!             Your Updated Medication List - Protect others around you: Learn how to safely use, store and throw away your medicines at www.disposemymeds.org.          This list is accurate as of 12/3/18  1:34 PM.  Always use your most recent med list.                   Brand Name Dispense Instructions for use Diagnosis    albuterol 108 (90 Base) MCG/ACT inhaler    PROAIR HFA/PROVENTIL HFA/VENTOLIN HFA     Inhale 2 puffs into the lungs every 4 hours as needed for shortness of breath / dyspnea or wheezing        amLODIPine 10 MG tablet    NORVASC    90 tablet    Take 1  tablet (10 mg) by mouth daily    Hypertension goal BP (blood pressure) < 130/80       aspirin 81 MG tablet    ASA     Take 1 tablet (81 mg) by mouth at bedtime        atorvastatin 40 MG tablet    LIPITOR    90 tablet    Take 1 tablet (40 mg) by mouth daily    Heart replaced by transplant (H)       biotin 5 MG Caps    BIOTIN 5000    30 capsule    Take 5 mg by mouth daily    Brittle nails       blood glucose monitoring lancets     102 each    Use to test blood sugar 2-3 times daily or as directed.  Ok to substitute alternative if insurance prefers.    Type 2 diabetes mellitus with stage 5 chronic kidney disease not on chronic dialysis, without long-term current use of insulin (H)       blood glucose monitoring test strip    NO BRAND SPECIFIED    100 each    Use to test blood sugar 2-3 times daily or as directed.    Type 2 diabetes mellitus with stage 5 chronic kidney disease not on chronic dialysis, without long-term current use of insulin (H)       chlorhexidine 0.12 % solution    PERIDEX    900 mL    Swish and spit 15 mLs in mouth 2 times daily    Oral ulceration       ciprofloxacin 500 MG tablet    CIPRO    14 tablet    Take 1 tablet (500 mg) by mouth every 24 hours    Oral ulceration       cloNIDine 0.1 MG tablet    CATAPRES    90 tablet    Take 1 tablet (0.1 mg) by mouth At Bedtime    Hypertension, Heart replaced by transplant (H)       clotrimazole 10 MG kalpana     120 Kalpana    Place 1 Kalpana (10 mg) inside cheek 4 times daily    Candidiasis of mouth       fluticasone 50 MCG/ACT nasal spray    FLONASE    16 g    Spray 1-2 sprays into both nostrils daily    Nasal congestion       gentian violet 1 % external solution     30 mL    Take 0.5 mLs by mouth 4 times daily    Oral ulceration       hydrALAZINE 100 MG tablet    APRESOLINE    90 tablet    Take 1 tablet (100 mg) by mouth every 8 hours    Essential hypertension       insulin aspart 100 UNIT/ML pen    NovoLOG PEN    9 mL    Inject 1-7 Units Subcutaneous 4  times daily (before meals and nightly)    Type 2 diabetes mellitus with diabetic nephropathy, with long-term current use of insulin (H)       * insulin isophane human 100 UNIT/ML injection    HumuLIN N PEN    6 mL    Inject 16 Units Subcutaneous every morning (before breakfast)    Type 2 diabetes mellitus with diabetic nephropathy, with long-term current use of insulin (H)       * insulin isophane human 100 UNIT/ML injection    HumuLIN N PEN    3 mL    Inject 12 Units Subcutaneous daily (with dinner)    Type 2 diabetes mellitus with diabetic nephropathy, with long-term current use of insulin (H)       lisinopril 5 MG tablet    PRINIVIL/ZESTRIL    90 tablet    Take 1 tablet (5 mg) by mouth daily    Hypertension goal BP (blood pressure) < 130/80       loratadine 10 MG tablet    CLARITIN     Take 10 mg by mouth daily Reported on 5/3/2017    Hypertensive cardiopathy, SOB (shortness of breath)       melatonin 1 MG Tabs tablet     120 tablet    Take 2 tablets (2 mg) by mouth nightly as needed for sleep    Heart transplanted (H)       multivitamin 1 MG capsule     120 capsule    Take 1 capsule by mouth daily        mycophenolate 250 MG capsule    GENERIC EQUIVALENT    240 capsule    Take 4 capsules (1,000 mg) by mouth 2 times daily    Heart transplanted (H)       pantoprazole 40 MG EC tablet    PROTONIX    60 tablet    Take 1 tablet (40 mg) by mouth 2 times daily (before meals)    Duodenitis       pentamidine 300 MG neb solution    NEBUPENT    300 mg    Inhale 300 mg into the lungs every 28 days Last given 9/19/18    Heart replaced by transplant (H)       polyethylene glycol packet    MIRALAX/GLYCOLAX    7 packet    Take 17 g by mouth daily as needed for constipation    Constipation, unspecified constipation type       predniSONE 5 MG tablet    DELTASONE    60 tablet    Take 1 tablet (5 mg) by mouth daily    Heart transplanted (H)       simethicone 80 MG chewable tablet    MYLICON    180 tablet    Take 1 tablet (80 mg) by  mouth every 6 hours as needed for cramping    Flatulence, eructation and gas pain       sulfamethoxazole-trimethoprim 400-80 MG tablet    BACTRIM/SEPTRA    14 tablet    Once per day on Tuesday, Thursday and Saturday. Take after dialysis on dialysis days.    Heart transplanted (H)       tacrolimus 1 MG capsule    GENERIC EQUIVALENT    60 capsule    Take 1 capsule (1 mg) by mouth 2 times daily    Heart transplanted (H)       traMADol 50 MG tablet    ULTRAM    10 tablet    Take 1 tablet (50 mg) by mouth every 12 hours as needed for moderate pain    Moderate pain       triamcinolone 0.1 % external ointment    KENALOG    80 g    Apply topically 2 times daily    Xerosis of skin       Urea 40 % Crea     85 g    Externally apply topically daily    Brittle nails       * Notice:  This list has 2 medication(s) that are the same as other medications prescribed for you. Read the directions carefully, and ask your doctor or other care provider to review them with you.

## 2018-12-03 NOTE — LETTER
12/3/2018       RE: Murray Nicholson  [address]  [place] [address]     Dear Colleague,    Thank you for referring your patient, Murray Nicholson, to the [facility] COLON AND RECTAL SURGERY at [facility]. Please see a copy of my visit note below.    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    INTERVAL HISTORY: Murray has been doing quite well. Denies increased pain, fevers, or chills. Tolerating diet well. Colostomy functioning well. Denies pouching issues. Continues on hemodialysis. He is currently listed for kidney transplantation. Heart TX allograft function seems to be great with no evidence of rejection. Currently on ASA but Eliquis was discontinued by Dr. Ernst. Recent EUA with colonoscopy showed a healed fistulotomy wound and a healthy-appearing 13-cm long Noni's pouch; and a 3 mm pedunculated polyp near the staple line, that was removed with a cold biopsy forceps. Pathology showed hyperplastic polyp.    Recent CT c/a/p showed:    IMPRESSION:   1. Postsurgical changes of sigmoidectomy, left lower quadrant end  colostomy, and small bowel resection without evidence of extraluminal  contrast, fistulous communication, or anastomosis breakdown  2. Stable postsurgical changes of heart transportation.   3. Stable appearance of the pancreatic head and neck IPMN's. Recommend  annual surveillance with MRI.  4. Overall significantly improved appearance of the peribronchial  groundglass opacities throughout the lungs with minimal residual right  upper lobe groundglass opacities. Recommend continued attention on  follow-up.    Physical Examination:  /83 (BP Location: Left arm, Patient Position: Chair, Cuff Size: Adult Regular)   "Pulse 104  Ht 5' 9\"  Wt 167 lb 15.9 oz  SpO2 100%  BMI 24.81 kg/m2  In no acute distress.    ASSESSMENT  I thoroughly discussed the risks, benefits, and alternatives of operative treatment and the patient agrees to proceed. Alb 3.2, Prealb 27.    PLAN  1. Schedule for HALS colostomy takedown, flex sig and possible DLI. Will need PAC, Labs, and MBP w/Abx.     Time spent: 30 minutes.  >50% spent in discussing, counseling and coordinating care.    Referring Provider:  Klever Ernst MD     Primary Care Provider:  Yahir Turcios M.D., M.Sc.     Division of Colon and Rectal Surgery  [facility]  "

## 2018-12-03 NOTE — PROCEDURES
PRELIMINARY CARDIAC CATH REPORT:     PROCEDURES PERFORMED:   Endomyocardial Biopsy  Right Heart Catheterization    PHYSICIANS:  Attending Physician: Marcelo Ramírez MD  Cardiology Fellow: Tunde Coffey MD    INDICATION:  Murray Nicholson is a 63 year old male  s/p OHT referred for elective endomyocardial biopsy/RHC.  Transplant was 6/14/18.    DESCRIPTION:  1. Consent obtained with discussion of risks.  All questions were answered.  2. Sterile prep and procedure.  3. Location with Sheaths:   Lf IJ  7 Fr 10 cm [short]  4. Access: Local anesthetic with lidocaine.  A standard 18 guage needle with ultrasound guidance was used to establish vascular access using a modified Seldinger technique.  5. Estimated blood loss: <5ml    Procedures:    ENDOMYOCARDIAL BIOPSY:  Successful collection of 4 right ventricular endomyocardial biopsy samples using the Jawz.  7Fr DAIG sheath was used for biopsy.    HEMODYNAMICS: See the details on the report.   BSA 1.92 m2 HR 95 bpm NIBP 163/96(122) mmHg  RA 3/4(2) RV 23/2 PA 23/10(13) PCW 3/4(2) mmHg  Kassi CO 7.9, CI 4.1, PA sat 76.9% Hgb 9.7g/dl PVR 1.4 TPR 1.6    Sheath Removal:  The IJ sheath was manually removed in the cardiac catheterization laboratory.    Contrast: Isovue, 0 ml   Fluoroscopy Time: 3.1 min  COMPLICATIONS:None    SUMMARY:   1. No evidence of PA HTN, no elevated RA, RV filling, and PCW pressures.  2. Preserved cardiac index.  3. Successful collection of 4 right ventricular endomyocardial biopsy samples.    PLAN:   - Bedrest and discharge today per protocol.    See CVIS report for final draft.    Tunde Coffey MD  Cardiology Fellow [id number]

## 2018-12-03 NOTE — PATIENT INSTRUCTIONS
Follow up:  1. Continue with Surgery on 12/11/18    Please call with any questions or concerns regarding your clinic visit today.    It is a pleasure being involved in your health care.    Contacts post-consultation depending on your need:    Radiology Appointments                [phone]    Schedule Clinic Appointments       [phone] # 1   M-F 7:30 - 5 pm    Sunita LEZAMA RN Coordinator           [phone]    Clinic Fax Number          [phone]    My Chart is available 24 hours a day and is a secure way to access your records and communicate with your care team.  I strongly recommend signing up if you haven't already done so, if you are comfortable with computers.  If you would like to inquire about this or are having problems with My Chart access, you may call [phone] or go online at [email or web address].  Please allow at least 24 hours for a response and extra time on weekends and Holidays.      MIRALAX/GATORADE    FIVE DAYS BEFORE    -Stop taking any of the following over-the-counter medications: Ibuprofen, Asprin,Iron supplements.    -If you are taking a blood thinner medication such as Coumadin, Plavix, or Warfarin, you MUST contact the prescribing physician regarding clearance for this procedure, or instructions for stopping/changing this medication before your procedure. -Please contact the nurse line at [phone] option #3 for any questions regarding other medications.       THREE DAYS BEFORE     Begin restricted low-residue diet.  Suggested foods include: white bread or rolls, plain crackers, white rice, skinless potatoes, low fiber cereals, chicken, turkey, fish or seafood, and eggs.  You may also have applesauce, pear sauce, soft honeydew, cantaloupe, ripe banana, canned fruit without seeds or skins.      Do not eat: Corn (any form), raw vegetables, foods with seeds, whole wheat or grain breads and cereals, brown or wild rice, granola, raisins and dried fruit, berries, popcorn,  all varieties of nuts, peanuts, and seeds.     Please go to your local pharmacy or store and purchase:    - 8.3 oz bottle of  Miralax (Powder)  - 10 oz bottle of Magnesium Citrate  -  64 oz of Gatorade (no red or purple)    THE DAY BEFORE YOUR PROCEDURE    - Begin allowed clear liquid diet at breakfast time.    - Mix Miralax and 64 oz. of Gatorade in a pitcher, and place in the fridge.      ALLOWED CLEAR LIQUIDS  -Water    -Regular or decaf coffee (no cream)    -Jell-O or popsicles (no red)    -Plain hard candy (no red)    -Clear juices or Gatorade (no red or purple)    -Chicken broth (no vegetables or noodles)    DO NOT DRINK  -Milk or mild products such as ice cream, malts, or shakes    -Red juices of any kind    -Santiago-aid, cranberry, or grape juice    -Juice with pulp (orange, grapefruit, pineapple,, or tomato)    -Cream soups of any kind    -Alcoholic beverages    ANTIBIOTICS    8:00 A.M.- Take one tab of Metronidazole (flagyle) and two tabs of Neomycin with one tab of ondansetron (zofran). Please take the ondansetron with the antibiotics as they can cause nausea.    2:00 P.M.- Take one tab of Metronidazole (flagyle) and two tabs of Neomycin with one tab of ondansetron (zofran). Please take the ondansetron with the antibiotics as they can cause nausea.    8:00 P.M.- Take one tab of Metronidazole (flagyle) and two tabs of Neomycin with one tab of ondansetron (zofran). Please take the ondansetron with the antibiotics as they can cause nausea.    4:00 P.M.- 6:00 P.M.-  Drink one eight (8) ounce glass of Miralax/Gatorade mixture every 30 minutes until the mixture is gone.  If you start to feel nauseous, you may space out your drinking intervals to every 45 minutes    8:00 P.M.- 10:00 P.M.- Drink the bottle of Magnesium Citrate.    You may have clear liquids up to 3 hours before procedure time

## 2018-12-03 NOTE — NURSING NOTE
Chief Complaint   Patient presents with     Follow Up For     6 month follow up post heart transplant      Vitals were taken and medications were reconciled.   Darya Moulton RMA  3:30 PM

## 2018-12-03 NOTE — MR AVS SNAPSHOT
After Visit Summary   12/3/2018    Murray Nicholson    MRN: [id number]           Patient Information     Date Of Birth          1955        Visit Information        Provider Department      12/3/2018 3:00 PM [facility]        Today's Diagnoses     Type 2 diabetes mellitus with stage 5 chronic kidney disease not on chronic dialysis, without long-term current use of insulin (H)    -  1    Heart transplanted (H)           Follow-ups after your visit        Your next 10 appointments already scheduled     Dec 07, 2018  8:00 AM CST   LAB with  LAB    [facility] ([facility])    [address] [facility]  [address] [facility] [id number] [phone]           Please do not eat 10-12 hours before your appointment if you are coming in fasting for labs on lipids, cholesterol, or glucose (sugar). This does not apply to pregnant women. Water, hot tea and black coffee (with nothing added) are okay. Do not drink other fluids, diet soda or chew gum.            Dec 11, 2018   Procedure with Rick Tran MD   [facility], [place], Same Day Surgery (--)    [address] [facility] [address]   [phone]            Dec 18, 2018  7:30 AM CST   (Arrive by 7:15 AM)   Return Visit with Tristan Bañuelos MD   [facility] ([facility])    [address] [place] [address]  [address] [facility] [id number] [phone]            Dec 21, 2018  9:30 AM CST   TELEMEDICINE with Eduardo Lea [facility] Medication [facility] Management ([facility])    [address] [place] [address]  [address] [facility] [address] [phone]           Note: this is not an onsite visit; there is no need to come to the facility.            Feb 01, 2019  8:30 AM CST   (Arrive by 8:15 AM)   RETURN HEART TRANSPLANT with Cleo Marks NP   [facility] ([facility]  [place]    [address]  [address]  [facility] [address]   [phone]            Mar 29, 2019  8:30 AM CDT   (Arrive by 8:15 AM)   RETURN HEART TRANSPLANT with Cleo Marks NP   [facility] ([facility])    [address] [place] [address]  [address]  [facility] [address]   [phone]            Jorge 10, 2019  3:00 PM CDT   (Arrive by 2:45 PM)   HEART TRANSPLANT ANNUAL with Klever Ernst MD   [facility] ([facility])    [address] [place] [address]  [address] [place] [address]   [phone]              Future tests that were ordered for you today     Open Future Orders        Priority Expected Expires Ordered    Heart Cath Heart biopsy Routine 2/1/2019 12/4/2019 12/4/2018    Heart Cath Heart biopsy Routine 4/1/2019 12/4/2019 12/4/2018    CRP inflammation Routine 12/7/2018 12/28/2018 12/3/2018    Tacrolimus level Routine 12/7/2018 12/28/2018 12/3/2018    Basic metabolic panel Routine 12/7/2018 12/28/2018 12/3/2018    CBC with platelets Routine  3/1/2019 12/1/2018    Comprehensive metabolic panel Routine  3/1/2019 12/1/2018    Prealbumin Routine  3/1/2019 12/1/2018            Who to contact     If you have questions or need follow up information about today's clinic visit or your schedule please contact [facility] MEDICATION THERAPY MANAGEMENT directly at [phone].  Normal or non-critical lab and imaging results will be communicated to you by MyChart, letter or phone within 4 business days after the clinic has received the results. If you do not hear from us within 7 days, please contact the clinic through MyChart or phone. If you have a critical or abnormal lab result, we will notify you by phone as soon as possible.  Submit refill requests through iogyn or call your pharmacy and they will forward the refill request to us. Please allow 3 business days for your refill to be completed.          Additional Information About Your  Visit        [employer] Information     [employer] gives you secure access to your electronic health record. If you see a primary care provider, you can also send messages to your care team and make appointments. If you have questions, please call your primary care clinic.  If you do not have a primary care provider, please call [phone] and they will assist you.        Care EveryWhere ID     This is your Care EveryWhere ID. This could be used by other organizations to access your [facility] medical records  [id number]         Blood Pressure from Last 3 Encounters:   12/03/18 131/83   12/03/18 131/83   12/03/18 131/83    Weight from Last 3 Encounters:   12/03/18 75.8 kg (167 lb)   12/03/18 75.8 kg (167 lb)   12/03/18 76.2 kg (167 lb 15.9 oz)              Today, you had the following     No orders found for display         Today's Medication Changes          These changes are accurate as of 12/3/18 11:59 PM.  If you have any questions, ask your nurse or doctor.               Start taking these medicines.        Dose/Directions    magnesium citrate solution   Used for:  Diverticulitis of intestine, part unspecified, with perforation and abscess with bleeding   Started by:  Rick Tran MD        Dose:  296 mL   Take 296 mLs by mouth See Admin Instructions Refer to surgery handout.   Quantity:  296 mL   Refills:  0       metroNIDAZOLE 500 MG tablet   Commonly known as:  FLAGYL   Used for:  Diverticulitis of intestine, part unspecified, with perforation and abscess with bleeding   Started by:  Rick Tran MD        Dose:  500 mg   Take 1 tablet (500 mg) by mouth every 8 hours for 1 day prior to surgery.  Take in conjunction with Neomycin Sulfate.   Quantity:  3 tablet   Refills:  0       neomycin 500 MG tablet   Commonly known as:  MYCIFRADIN   Used for:  Diverticulitis of intestine, part unspecified, with perforation and abscess with bleeding   Started by:  Rick Tran MD         Dose:  1000 mg   Take 2 tablets (1,000 mg) by mouth every 8 hours for 1 day prior to surgery.  Take in conjunction with Flagyl.   Quantity:  6 tablet   Refills:  0       ondansetron 4 MG tablet   Commonly known as:  ZOFRAN   Used for:  Diverticulitis of intestine, part unspecified, with perforation and abscess with bleeding   Started by:  Rick Tran MD        Dose:  4 mg   Take 1 tablet (4 mg) by mouth every 6 hours as needed for nausea While taking neomycin and flagyl.   Quantity:  3 tablet   Refills:  0         These medicines have changed or have updated prescriptions.        Dose/Directions    atorvastatin 40 MG tablet   Commonly known as:  LIPITOR   This may have changed:  additional instructions   Used for:  Heart replaced by transplant (H)        Dose:  40 mg   Take 1 tablet (40 mg) by mouth daily   Quantity:  90 tablet   Refills:  3       * polyethylene glycol packet   Commonly known as:  MIRALAX/GLYCOLAX   This may have changed:  Another medication with the same name was added. Make sure you understand how and when to take each.   Used for:  Constipation, unspecified constipation type   Changed by:  Rick Tran MD        Dose:  17 g   Take 17 g by mouth daily as needed for constipation   Quantity:  7 packet   Refills:  0       * polyethylene glycol packet   Commonly known as:  MIRALAX   This may have changed:  You were already taking a medication with the same name, and this prescription was added. Make sure you understand how and when to take each.   Used for:  Diverticulitis of intestine, part unspecified, with perforation and abscess with bleeding   Changed by:  Rick Tran MD        Dose:  238 g   Take 238 g by mouth See Admin Instructions One 8.3-ounce bottle (238 g).  Refer to surgery packet.   Quantity:  238 g   Refills:  0       tacrolimus 1 MG capsule   Commonly known as:  GENERIC EQUIVALENT   This may have changed:  how much to take   Used for:  Heart  transplanted (H)   Changed by:  Klever Ernst MD        Dose:  2 mg   Take 2 capsules (2 mg) by mouth 2 times daily   Quantity:  120 capsule   Refills:  11       * Notice:  This list has 2 medication(s) that are the same as other medications prescribed for you. Read the directions carefully, and ask your doctor or other care provider to review them with you.         Where to get your medicines      These medications were sent to [facility] - [place], [place] - [address] [facility]  [address] [facility] [address]     Phone:  [phone]     tacrolimus 1 MG capsule         These medications were sent to [id number] - [place], [place] - [address], [place] [address]     Phone:  [phone]     magnesium citrate solution    metroNIDAZOLE 500 MG tablet    neomycin 500 MG tablet    ondansetron 4 MG tablet    polyethylene glycol packet                Primary Care Provider Office Phone # Fax #    Yahir Turcios [phone]       [address]  [facility] [address]        Goals        Lifestyle    Increase physical activity     Notes - Note created  7/3/2018  1:51 PM by Carrie Tran RN    Goal Statement: I will start cardiac rehab  Measure of Success: starts cardiac rehab  Supportive Steps to Achieve: support of RN CC  Barriers: none  Strengths: willingness to participate   Date to Achieve By: 7-15-18          Equal Access to Services     JOHN HAUSER AH: Hadii aad ku hadasho Sobriandaali, waaxda luqadaha, qaybta kaalmada jose crow . So [facility] [phone].    ATENCIÓN: Si habla español, tiene a ortiz disposición servicios gratuitos de asistencia lingüística. Llnova al [phone].    We comply with applicable federal civil rights laws and [place] laws. We do not discriminate on the basis of race, color, national origin, age, disability, sex, sexual orientation,  or gender identity.            Thank you!     Thank you for choosing [facility] MEDICATION THERAPY MANAGEMENT  for your care. Our goal is always to provide you with excellent care. Hearing back from our patients is one way we can continue to improve our services. Please take a few minutes to complete the written survey that you may receive in the mail after your visit with us. Thank you!             Your Updated Medication List - Protect others around you: Learn how to safely use, store and throw away your medicines at www.disposemymeds.org.          This list is accurate as of 12/3/18 11:59 PM.  Always use your most recent med list.                   Brand Name Dispense Instructions for use Diagnosis    albuterol 108 (90 Base) MCG/ACT inhaler    PROAIR HFA/PROVENTIL HFA/VENTOLIN HFA     Inhale 2 puffs into the lungs every 4 hours as needed for shortness of breath / dyspnea or wheezing        amLODIPine 10 MG tablet    NORVASC    90 tablet    Take 1 tablet (10 mg) by mouth daily    Hypertension goal BP (blood pressure) < 130/80       aspirin 81 MG tablet    ASA     Take 1 tablet (81 mg) by mouth at bedtime        atorvastatin 40 MG tablet    LIPITOR    90 tablet    Take 1 tablet (40 mg) by mouth daily    Heart replaced by transplant (H)       biotin 5 MG Caps    BIOTIN 5000    30 capsule    Take 5 mg by mouth daily    Brittle nails       blood glucose monitoring lancets     102 each    Use to test blood sugar 2-3 times daily or as directed.  Ok to substitute alternative if insurance prefers.    Type 2 diabetes mellitus with stage 5 chronic kidney disease not on chronic dialysis, without long-term current use of insulin (H)       blood glucose monitoring test strip    NO BRAND SPECIFIED    100 each    Use to test blood sugar 2-3 times daily or as directed.    Type 2 diabetes mellitus with stage 5 chronic kidney disease not on chronic dialysis, without long-term current use of insulin (H)       chlorhexidine 0.12 %  solution    PERIDEX    900 mL    Swish and spit 15 mLs in mouth 2 times daily    Oral ulceration       cloNIDine 0.1 MG tablet    CATAPRES    90 tablet    Take 1 tablet (0.1 mg) by mouth At Bedtime    Hypertension, Heart replaced by transplant (H)       clotrimazole 10 MG kalpana     120 Kalpana    Place 1 Kalpana (10 mg) inside cheek 4 times daily    Candidiasis of mouth       fluticasone 50 MCG/ACT nasal spray    FLONASE    16 g    Spray 1-2 sprays into both nostrils daily    Nasal congestion       gentian violet 1 % external solution     30 mL    Take 0.5 mLs by mouth 4 times daily    Oral ulceration       hydrALAZINE 100 MG tablet    APRESOLINE    90 tablet    Take 1 tablet (100 mg) by mouth every 8 hours    Essential hypertension       insulin aspart 100 UNIT/ML pen    NovoLOG PEN    9 mL    Inject 1-7 Units Subcutaneous 4 times daily (before meals and nightly)    Type 2 diabetes mellitus with diabetic nephropathy, with long-term current use of insulin (H)       * insulin isophane human 100 UNIT/ML injection    HumuLIN N PEN    6 mL    Inject 16 Units Subcutaneous every morning (before breakfast)    Type 2 diabetes mellitus with diabetic nephropathy, with long-term current use of insulin (H)       * insulin isophane human 100 UNIT/ML injection    HumuLIN N PEN    3 mL    Inject 12 Units Subcutaneous daily (with dinner)    Type 2 diabetes mellitus with diabetic nephropathy, with long-term current use of insulin (H)       lisinopril 5 MG tablet    PRINIVIL/ZESTRIL    90 tablet    Take 1 tablet (5 mg) by mouth daily    Hypertension goal BP (blood pressure) < 130/80       loratadine 10 MG tablet    CLARITIN     Take 10 mg by mouth daily Reported on 5/3/2017    Hypertensive cardiopathy, SOB (shortness of breath)       magnesium citrate solution     296 mL    Take 296 mLs by mouth See Admin Instructions Refer to surgery handout.    Diverticulitis of intestine, part unspecified, with perforation and abscess with bleeding        melatonin 1 MG Tabs tablet     120 tablet    Take 2 tablets (2 mg) by mouth nightly as needed for sleep    Heart transplanted (H)       metroNIDAZOLE 500 MG tablet    FLAGYL    3 tablet    Take 1 tablet (500 mg) by mouth every 8 hours for 1 day prior to surgery.  Take in conjunction with Neomycin Sulfate.    Diverticulitis of intestine, part unspecified, with perforation and abscess with bleeding       multivitamin 1 MG capsule     120 capsule    Take 1 capsule by mouth daily        mycophenolate 250 MG capsule    GENERIC EQUIVALENT    240 capsule    Take 4 capsules (1,000 mg) by mouth 2 times daily    Heart transplanted (H)       neomycin 500 MG tablet    MYCIFRADIN    6 tablet    Take 2 tablets (1,000 mg) by mouth every 8 hours for 1 day prior to surgery.  Take in conjunction with Flagyl.    Diverticulitis of intestine, part unspecified, with perforation and abscess with bleeding       ondansetron 4 MG tablet    ZOFRAN    3 tablet    Take 1 tablet (4 mg) by mouth every 6 hours as needed for nausea While taking neomycin and flagyl.    Diverticulitis of intestine, part unspecified, with perforation and abscess with bleeding       pantoprazole 40 MG EC tablet    PROTONIX    60 tablet    Take 1 tablet (40 mg) by mouth 2 times daily (before meals)    Duodenitis       pentamidine 300 MG neb solution    NEBUPENT    300 mg    Inhale 300 mg into the lungs every 28 days Last given 9/19/18    Heart replaced by transplant (H)       * polyethylene glycol packet    MIRALAX/GLYCOLAX    7 packet    Take 17 g by mouth daily as needed for constipation    Constipation, unspecified constipation type       * polyethylene glycol packet    MIRALAX    238 g    Take 238 g by mouth See Admin Instructions One 8.3-ounce bottle (238 g).  Refer to surgery packet.    Diverticulitis of intestine, part unspecified, with perforation and abscess with bleeding       predniSONE 5 MG tablet    DELTASONE    60 tablet    Take 1 tablet (5 mg) by  mouth daily    Heart transplanted (H)       simethicone 80 MG chewable tablet    MYLICON    180 tablet    Take 1 tablet (80 mg) by mouth every 6 hours as needed for cramping    Flatulence, eructation and gas pain       sulfamethoxazole-trimethoprim 400-80 MG tablet    BACTRIM/SEPTRA    14 tablet    Once per day on Tuesday, Thursday and Saturday. Take after dialysis on dialysis days.    Heart transplanted (H)       tacrolimus 1 MG capsule    GENERIC EQUIVALENT    120 capsule    Take 2 capsules (2 mg) by mouth 2 times daily    Heart transplanted (H)       traMADol 50 MG tablet    ULTRAM    10 tablet    Take 1 tablet (50 mg) by mouth every 12 hours as needed for moderate pain    Moderate pain       triamcinolone 0.1 % external ointment    KENALOG    80 g    Apply topically 2 times daily    Xerosis of skin       Urea 40 % Crea     85 g    Externally apply topically daily    Brittle nails       * Notice:  This list has 4 medication(s) that are the same as other medications prescribed for you. Read the directions carefully, and ask your doctor or other care provider to review them with you.

## 2018-12-03 NOTE — PROGRESS NOTES
SUBJECTIVE/OBJECTIVE:                Murray Nicholson is a 63 year old male coming in for a follow-up visit for Medication Therapy Management.  He was referred to me from txp team for blood sugar monitoring.      Chief Complaint: diabetes follow up    Tobacco: No tobacco use  Alcohol: not currently using    Medication Adherence/Access:  Patient uses pill box(es).  Patient takes medications 4 time(s) per day. 7-7:30am, 12pm, 4-6pm, 7:30pm.   Per patient, misses medication 0 times per week. Has been taking Immunos at 7:30am and pm, since his labs are at 7:30am.   The patient fills medications at [place]: YES.    Heart Transplant:  Current immunosuppressants include TAC 1mg BID, prednisone 5mg daily, MMF 500mg BID. Next week biopsy. Tremor has improved greatly, only happens occasionally.   Transplant date: 6/14/18  Estimated Creatinine Clearance: 11.8 mL/min (based on Cr of 7.09).  CMV prophylaxis: CMV mismatch, Valcyte was stopped due to neutropenia. WBC currently WNL  PCP prophylaxis: Bactrim holding due to neutropenia. Receiving Pentamidine inhalations monthly.   Antifungal Prophylaxis: Nystatin until off steroids. QID.   PPI use: Pantoprazole 40mg BID. Denies GERD sx, Duodenitis  Current supplements for electrolyte replacement:  Nephrocaps  Tx Coordinator: Bob Ulloa MD: Klever, Using Med Card: Yes  Recent Infections:  Concerns about a post OP infection. Pt is getting Vancomycin IV after Dialysis sessions.   Recent Hospitalizations: recent txp  Home BPs: See htn     Diabete:  Pt currently taking NPH  20 units units q afternoon.  Has Novolog Sliding scale as well. Pt started giving NPH in the afternoon as he wanted to better cover his evening sugars.   Novolog Sliding scale   140-189: 1 unit  190-239: 2 units  240-289: 3 units  290-339: 4 units  340-399: 5 units  400-449: 6 units  Over 450: 7 units and contact me  SMBG: TID.   Ranges (patient reported):   Date FBG/ 2hours post Lunch/2hours post Dinner /2hours post  bedtime   12/3 82 107     12/2 96 154      12/1 97 137  156    11/30 97 146 114    11/29 87 143 192  151   11/28 98  132    11/27 120      11/26 99 174  141   11/25 109 106 125    11/24 114 148 161    11/23 80 149 181 192   11/22 123   254       Date FBG/ 2hours post Lunch/2hours post Dinner /2hours post   11/5 85 138    11/4 82 /111 269 (3U)   11/3 107 168 (1U) 198   11/2 84 219 (2 U) 191 (2U)   11/1 87 101 252 (3U)   10/31 87 188 (1U) 207 (2U)   10/30 107 132 219 (2U)   10/29 94 148 (1U) 196 (2U)   10/28 88 115 258 (2U)   10/27 106 144 (1U) 191 (2U)     Patient is experiencing mild hypoglycemia in the mornings, but no sx.   Recent symptoms of high blood sugar? none  Diet/Exercise: Eating around meals a day. Spinach, potato, rotisserie chicken, Carrots dinner. Low sodium diet. Has been eating popcorn in the afternoon.   Lab Results   Component Value Date    A1C 7.0 07/18/2018    A1C 7.0 04/26/2018    A1C 6.3 04/04/2018    A1C 8.6 02/02/2018    A1C 9.1 01/08/2018      Today's Vitals: There were no vitals taken for this visit.      ASSESSMENT:              Current medications were reviewed today as discussed above.      Medication Adherence: good, no issues found    Heart Transplant:  Stable.     Diabetes: Pt was instructed to start taking NPH in the morning again rather than the evening. Overall blood sugars look great. Pt instructed to decrease NPH to 15 units daily. Long term, may be able to remove NPH and start an oral medication, such as glipizide.   PLAN:                  Pt to...  1. Administer NPH before noon in the morning.     I spent 15 minutes with this patient today. I offer these suggestions for consideration by txp team. A1c entered per CPA with PCP.  A copy of the visit note was provided to the patient's txp team and PCP.     Will follow up in 2-3 weeks.    The patient denied a summary of these recommendations as an after visit summary.    Eduardo Lea, PharmD  [facility] Pharmacist    Phone: [phone]

## 2018-12-03 NOTE — MR AVS SNAPSHOT
After Visit Summary   12/3/2018    Murray Nicholson    MRN: [id number]           Patient Information     Date Of Birth          1955        Visit Information        Provider Department      12/3/2018 1:30 PM Michelle Holbrook PA M [facility]        Today's Diagnoses     Pre-operative general physical examination    -  1    Colostomy in place (H)          Care Instructions    Preparing for Your Surgery      Name:  Murray Nicholson   MRN:  [id number]   :  1955   Today's Date:  12/3/2018     Arriving for surgery:  Surgery date:  18  Arrival time:  06:15 am  Please come to:       [facility] [address]  [address] [place]  [address]    -   parking is available in front of the hospital from 5:15 am to 8:00 pm    -  Stop at the Information Desk in the lobby    -   Inform the information person that you are here for surgery. An escort to 3c will be provided. If you would not like an escort, please proceed to 3C on the 3rd floor. [phone]     What can I eat or drink?  -  You may have solid food or milk products - see instructions per service for bowel prep.  Printed and given to patient.  -  You may have water, apple juice or 7up/Sprite until 3 hours prior to your surgery.    Which medicines can I take?  Stop Aspirin, vitamins and supplements one week prior to surgery.  Hold Ibuprofen and Naproxen for 24 hours prior to surgery.   -  Do NOT take these medications in the morning, the day of surgery:  aspart insulin + lisinopril if normally taken in the morning.    -  Please take these medications the day of surgery:  Tylenol if needed; take all other scheduled medications normally taken in the morning. Take 10 units of humulin N the morning of surgery.    How do I prepare myself?  -  Take two showers: one the night before surgery; and one the morning of surgery.         Use Scrubcare or Hibiclens to wash from neck  down.  You may use your own     shampoo and conditioner. No other hair products.   -  Do NOT use lotion, powder, deodorant, or antiperspirant the day of your surgery.  -  Do NOT wear any jewelry.  - Do not bring your own medications to the hospital, except for inhalers and eye  drops.  -  Bring your ID and insurance card.    Questions or Concerns:  -If you have questions or concerns regarding the day of surgery, please call [phone].     -If you are scheduled at the Ambulatory Surgery Center please call [phone].    -For questions after surgery please call your surgeons office.                     Follow-ups after your visit        Your next 10 appointments already scheduled     Dec 03, 2018  3:00 PM CST   (Arrive by 2:45 PM)   SHORT with Eduardo Lea [facility] ([facility])    [address] [place] [address]  [address] [facility] [address]   [phone]            Dec 03, 2018  3:30 PM CST   (Arrive by 3:15 PM)   RETURN HEART TRANSPLANT with Klever Ernst MD   [facility] ([facility])    [address] [place] [address]  [address] [place] [id number] [phone]            Dec 11, 2018   Procedure with Rick Tran MD   [facility] [place], Same Day Surgery (--)    [address] [facility] [address]   [phone]            Feb 01, 2019  8:30 AM CST   (Arrive by 8:15 AM)   RETURN HEART TRANSPLANT with Cleo Marks NP   [facility] ([facility])    [address] [place] [address]  [address] [place] [id number] [phone]            Jorge 10, 2019  3:00 PM CDT   (Arrive by 2:45 PM)   HEART TRANSPLANT ANNUAL with Klever Ernst MD   [facility] ([facility])    [address] [place] [address]  [address] [place] [id number] [phone]              Future tests that were ordered for you today     Open Future Orders        Priority  "Expected Expires Ordered    ABO/Rh type and screen Routine 12/3/2018 1/2/2019 12/3/2018    CBC with platelets Routine  3/1/2019 12/1/2018    Comprehensive metabolic panel Routine  3/1/2019 12/1/2018    Prealbumin Routine  3/1/2019 12/1/2018            Who to contact     Please call your clinic at [phone] to:    Ask questions about your health    Make or cancel appointments    Discuss your medicines    Learn about your test results    Speak to your doctor            Additional Information About Your Visit        [employer] Information     [employer] gives you secure access to your electronic health record. If you see a primary care provider, you can also send messages to your care team and make appointments. If you have questions, please call your primary care clinic.  If you do not have a primary care provider, please call [phone] and they will assist you.      [employer] is an electronic gateway that provides easy, online access to your medical records. With [employer], you can request a clinic appointment, read your test results, renew a prescription or communicate with your care team.     To access your existing account, please contact your [facility] or call [phone] for assistance.        Care EveryWhere ID     This is your Care EveryWhere ID. This could be used by other organizations to access your [facility] medical records  [id number]        Your Vitals Were     Pulse Height Pulse Oximetry BMI (Body Mass Index)          104 1.753 m (5' 9\") 100% 24.66 kg/m2         Blood Pressure from Last 3 Encounters:   12/03/18 131/83   12/03/18 131/83   12/03/18 (!) 158/92    Weight from Last 3 Encounters:   12/03/18 75.8 kg (167 lb)   12/03/18 76.2 kg (167 lb 15.9 oz)   12/03/18 76 kg (167 lb 8.8 oz)                 Today's Medication Changes          These changes are accurate as of 12/3/18  2:18 PM.  If you have any questions, ask your nurse or doctor.               Start taking these " medicines.        Dose/Directions    magnesium citrate solution   Used for:  Diverticulitis of intestine, part unspecified, with perforation and abscess with bleeding   Started by:  Rick Tran MD        Dose:  296 mL   Take 296 mLs by mouth See Admin Instructions Refer to surgery handout.   Quantity:  296 mL   Refills:  0       metroNIDAZOLE 500 MG tablet   Commonly known as:  FLAGYL   Used for:  Diverticulitis of intestine, part unspecified, with perforation and abscess with bleeding   Started by:  Rick Tran MD        Dose:  500 mg   Take 1 tablet (500 mg) by mouth every 8 hours for 1 day prior to surgery.  Take in conjunction with Neomycin Sulfate.   Quantity:  3 tablet   Refills:  0       neomycin 500 MG tablet   Commonly known as:  MYCIFRADIN   Used for:  Diverticulitis of intestine, part unspecified, with perforation and abscess with bleeding   Started by:  Rick Tran MD        Dose:  1000 mg   Take 2 tablets (1,000 mg) by mouth every 8 hours for 1 day prior to surgery.  Take in conjunction with Flagyl.   Quantity:  6 tablet   Refills:  0       ondansetron 4 MG tablet   Commonly known as:  ZOFRAN   Used for:  Diverticulitis of intestine, part unspecified, with perforation and abscess with bleeding   Started by:  Rick Tran MD        Dose:  4 mg   Take 1 tablet (4 mg) by mouth every 6 hours as needed for nausea While taking neomycin and flagyl.   Quantity:  3 tablet   Refills:  0         These medicines have changed or have updated prescriptions.        Dose/Directions    atorvastatin 40 MG tablet   Commonly known as:  LIPITOR   This may have changed:  additional instructions   Used for:  Heart replaced by transplant (H)        Dose:  40 mg   Take 1 tablet (40 mg) by mouth daily   Quantity:  90 tablet   Refills:  3       * polyethylene glycol packet   Commonly known as:  MIRALAX/GLYCOLAX   This may have changed:  Another medication with the same name  was added. Make sure you understand how and when to take each.   Used for:  Constipation, unspecified constipation type   Changed by:  Rick Tran MD        Dose:  17 g   Take 17 g by mouth daily as needed for constipation   Quantity:  7 packet   Refills:  0       * polyethylene glycol packet   Commonly known as:  MIRALAX   This may have changed:  You were already taking a medication with the same name, and this prescription was added. Make sure you understand how and when to take each.   Used for:  Diverticulitis of intestine, part unspecified, with perforation and abscess with bleeding   Changed by:  Rick Tran MD        Dose:  238 g   Take 238 g by mouth See Admin Instructions One 8.3-ounce bottle (238 g).  Refer to surgery packet.   Quantity:  238 g   Refills:  0       * Notice:  This list has 2 medication(s) that are the same as other medications prescribed for you. Read the directions carefully, and ask your doctor or other care provider to review them with you.         Where to get your medicines      These medications were sent to [id number] - [place], [place] - [address], [place] [address]     Phone:  [phone]     magnesium citrate solution    metroNIDAZOLE 500 MG tablet    neomycin 500 MG tablet    ondansetron 4 MG tablet    polyethylene glycol packet                Primary Care Provider Office Phone # Fax #    Yahir Turcios [phone]       [address]  [facility] [address]        Goals        Lifestyle    Increase physical activity     Notes - Note created  7/3/2018  1:51 PM by Carrie Tran, RN    Goal Statement: I will start cardiac rehab  Measure of Success: starts cardiac rehab  Supportive Steps to Achieve: support of RN CC  Barriers: none  Strengths: willingness to participate   Date to Achieve By: 7-15-18          Equal Access to Services     JOHN HAUSER AH: Aubrie aad  felipe Thomas, wahunterda yanelyjodeeha, qaslimta kajose crow, waxlisette heshamin hayaaalexa richmonddorothy jimenezvillafaby palencia lucy. So [facility] [phone].    ATENCIÓN: Si radhala shane, tiene a ortiz disposición servicios gratuitos de asistencia lingüística. Keely al [phone].    We comply with applicable federal civil rights laws and [place] laws. We do not discriminate on the basis of race, color, national origin, age, disability, sex, sexual orientation, or gender identity.            Thank you!     Thank you for choosing [facility]  for your care. Our goal is always to provide you with excellent care. Hearing back from our patients is one way we can continue to improve our services. Please take a few minutes to complete the written survey that you may receive in the mail after your visit with us. Thank you!             Your Updated Medication List - Protect others around you: Learn how to safely use, store and throw away your medicines at www.disposemymeds.org.          This list is accurate as of 12/3/18  2:18 PM.  Always use your most recent med list.                   Brand Name Dispense Instructions for use Diagnosis    albuterol 108 (90 Base) MCG/ACT inhaler    PROAIR HFA/PROVENTIL HFA/VENTOLIN HFA     Inhale 2 puffs into the lungs every 4 hours as needed for shortness of breath / dyspnea or wheezing        amLODIPine 10 MG tablet    NORVASC    90 tablet    Take 1 tablet (10 mg) by mouth daily    Hypertension goal BP (blood pressure) < 130/80       aspirin 81 MG tablet    ASA     Take 1 tablet (81 mg) by mouth at bedtime        atorvastatin 40 MG tablet    LIPITOR    90 tablet    Take 1 tablet (40 mg) by mouth daily    Heart replaced by transplant (H)       biotin 5 MG Caps    BIOTIN 5000    30 capsule    Take 5 mg by mouth daily    Brittle nails       blood glucose monitoring lancets     102 each    Use to test blood sugar 2-3 times daily or as directed.  Ok to substitute alternative if insurance  prefers.    Type 2 diabetes mellitus with stage 5 chronic kidney disease not on chronic dialysis, without long-term current use of insulin (H)       blood glucose monitoring test strip    NO BRAND SPECIFIED    100 each    Use to test blood sugar 2-3 times daily or as directed.    Type 2 diabetes mellitus with stage 5 chronic kidney disease not on chronic dialysis, without long-term current use of insulin (H)       chlorhexidine 0.12 % solution    PERIDEX    900 mL    Swish and spit 15 mLs in mouth 2 times daily    Oral ulceration       ciprofloxacin 500 MG tablet    CIPRO    14 tablet    Take 1 tablet (500 mg) by mouth every 24 hours    Oral ulceration       cloNIDine 0.1 MG tablet    CATAPRES    90 tablet    Take 1 tablet (0.1 mg) by mouth At Bedtime    Hypertension, Heart replaced by transplant (H)       clotrimazole 10 MG kalpana     120 Kalpana    Place 1 Kalpana (10 mg) inside cheek 4 times daily    Candidiasis of mouth       fluticasone 50 MCG/ACT nasal spray    FLONASE    16 g    Spray 1-2 sprays into both nostrils daily    Nasal congestion       gentian violet 1 % external solution     30 mL    Take 0.5 mLs by mouth 4 times daily    Oral ulceration       hydrALAZINE 100 MG tablet    APRESOLINE    90 tablet    Take 1 tablet (100 mg) by mouth every 8 hours    Essential hypertension       insulin aspart 100 UNIT/ML pen    NovoLOG PEN    9 mL    Inject 1-7 Units Subcutaneous 4 times daily (before meals and nightly)    Type 2 diabetes mellitus with diabetic nephropathy, with long-term current use of insulin (H)       * insulin isophane human 100 UNIT/ML injection    HumuLIN N PEN    6 mL    Inject 16 Units Subcutaneous every morning (before breakfast)    Type 2 diabetes mellitus with diabetic nephropathy, with long-term current use of insulin (H)       * insulin isophane human 100 UNIT/ML injection    HumuLIN N PEN    3 mL    Inject 12 Units Subcutaneous daily (with dinner)    Type 2 diabetes mellitus with diabetic  nephropathy, with long-term current use of insulin (H)       lisinopril 5 MG tablet    PRINIVIL/ZESTRIL    90 tablet    Take 1 tablet (5 mg) by mouth daily    Hypertension goal BP (blood pressure) < 130/80       loratadine 10 MG tablet    CLARITIN     Take 10 mg by mouth daily Reported on 5/3/2017    Hypertensive cardiopathy, SOB (shortness of breath)       magnesium citrate solution     296 mL    Take 296 mLs by mouth See Admin Instructions Refer to surgery handout.    Diverticulitis of intestine, part unspecified, with perforation and abscess with bleeding       melatonin 1 MG Tabs tablet     120 tablet    Take 2 tablets (2 mg) by mouth nightly as needed for sleep    Heart transplanted (H)       metroNIDAZOLE 500 MG tablet    FLAGYL    3 tablet    Take 1 tablet (500 mg) by mouth every 8 hours for 1 day prior to surgery.  Take in conjunction with Neomycin Sulfate.    Diverticulitis of intestine, part unspecified, with perforation and abscess with bleeding       multivitamin 1 MG capsule     120 capsule    Take 1 capsule by mouth daily        mycophenolate 250 MG capsule    GENERIC EQUIVALENT    240 capsule    Take 4 capsules (1,000 mg) by mouth 2 times daily    Heart transplanted (H)       neomycin 500 MG tablet    MYCIFRADIN    6 tablet    Take 2 tablets (1,000 mg) by mouth every 8 hours for 1 day prior to surgery.  Take in conjunction with Flagyl.    Diverticulitis of intestine, part unspecified, with perforation and abscess with bleeding       ondansetron 4 MG tablet    ZOFRAN    3 tablet    Take 1 tablet (4 mg) by mouth every 6 hours as needed for nausea While taking neomycin and flagyl.    Diverticulitis of intestine, part unspecified, with perforation and abscess with bleeding       pantoprazole 40 MG EC tablet    PROTONIX    60 tablet    Take 1 tablet (40 mg) by mouth 2 times daily (before meals)    Duodenitis       pentamidine 300 MG neb solution    NEBUPENT    300 mg    Inhale 300 mg into the lungs every  28 days Last given 9/19/18    Heart replaced by transplant (H)       * polyethylene glycol packet    MIRALAX/GLYCOLAX    7 packet    Take 17 g by mouth daily as needed for constipation    Constipation, unspecified constipation type       * polyethylene glycol packet    MIRALAX    238 g    Take 238 g by mouth See Admin Instructions One 8.3-ounce bottle (238 g).  Refer to surgery packet.    Diverticulitis of intestine, part unspecified, with perforation and abscess with bleeding       predniSONE 5 MG tablet    DELTASONE    60 tablet    Take 1 tablet (5 mg) by mouth daily    Heart transplanted (H)       simethicone 80 MG chewable tablet    MYLICON    180 tablet    Take 1 tablet (80 mg) by mouth every 6 hours as needed for cramping    Flatulence, eructation and gas pain       sulfamethoxazole-trimethoprim 400-80 MG tablet    BACTRIM/SEPTRA    14 tablet    Once per day on Tuesday, Thursday and Saturday. Take after dialysis on dialysis days.    Heart transplanted (H)       tacrolimus 1 MG capsule    GENERIC EQUIVALENT    60 capsule    Take 1 capsule (1 mg) by mouth 2 times daily    Heart transplanted (H)       traMADol 50 MG tablet    ULTRAM    10 tablet    Take 1 tablet (50 mg) by mouth every 12 hours as needed for moderate pain    Moderate pain       triamcinolone 0.1 % external ointment    KENALOG    80 g    Apply topically 2 times daily    Xerosis of skin       Urea 40 % Crea     85 g    Externally apply topically daily    Brittle nails       * Notice:  This list has 4 medication(s) that are the same as other medications prescribed for you. Read the directions carefully, and ask your doctor or other care provider to review them with you.

## 2018-12-03 NOTE — PATIENT INSTRUCTIONS
Please call your coordinator at [phone] with any questions or concerns.      Increase the Prograf to 2 mg twice daily and recheck labs THIS Friday 12/7 at 8AM. 12-hour trough on Prograf.      Coordinator will call with biopsy results.      will arrange appt with ENT (Sarmad)

## 2018-12-03 NOTE — PROGRESS NOTES
Met with patient to discuss risks and benefits of planned right heart catheterization and heart biopsy. Risks including, but not limited to, bleeding, infection, and arrhythmia were discussed. Consent form completed, signed, and awaiting physician co-signature.      Bobby Vogt PA-C  [facility] Cardiology Team

## 2018-12-03 NOTE — H&P
Pre-Operative H & P UPDATE    CC:  Preoperative exam to assess for increased cardiopulmonary risk while undergoing surgery and anesthesia.    Date of Encounter: 12/3/2018  Primary Care Physician:  Yahir Turcios  Murray Nicholson is a 63 year old male who had pre-operative H & P on 11/16/18 by me in preparation for lap colostomy take-down, possible ileostomy, with Dr. Rick Tran, on 12/11/18, in treatment after diverticulitis, at [facility]. This is an update because he had an exam under anesthesia, sigmoidoscopy and polypectomy since that physical exam. He feels good with no other changes in his health.    History is obtained from the patient.     Past Medical History  Past Medical History:   Diagnosis Date    Orthotopic heart transplant 5/2018     Allergic rhinitis, cause unspecified      Anemia of chronic kidney failure      Ascending aortic aneurysm (H)      Chronic kidney disease, stage 5 (H)      Dialysis patient (H)     Tues-Thur-Sat     Dyslipidemia      Esophageal reflux      Hearing problem      Hypertension      Immunosuppression (H)      MGUS (monoclonal gammopathy of unknown significance)      SHEELA (obstructive sleep apnea)     No CPAP     Type 2 diabetes mellitus (H)        Past Surgical History  Past Surgical History:   Procedure Laterality Date     CARDIAC SURGERY       COLONOSCOPY N/A 5/3/2018    Procedure: COLONOSCOPY;  colonoscopy ;  Surgeon: Ammon Castillo MD;  Location: [facility]     ESOPHAGOSCOPY, GASTROSCOPY, DUODENOSCOPY (EGD), COMBINED N/A 5/7/2018    Procedure: COMBINED ENDOSCOPIC ULTRASOUND, ESOPHAGOSCOPY, GASTROSCOPY, DUODENOSCOPY (EGD), FINE NEEDLE ASPIRATE/BIOPSY;  Endoscopic Ultrasound with Fine Needle Aspiration ;  Surgeon: Alon Don MD;  Location: [facility]     EXAM UNDER ANESTHESIA RECTUM N/A 8/12/2018    Procedure: EXAM UNDER ANESTHESIA RECTUM;  EXAM UNDER ANESTHESIA RECTUM ,COMBINED INCISION AND DRAINAGE OF RECTAL  ABCESS ;  Surgeon: Rick Tran MD;  Location: [facility]     INCISION AND DRAINAGE RECTUM, COMBINED N/A 8/12/2018    Procedure: COMBINED INCISION AND DRAINAGE RECTUM;;  Surgeon: Rick Tran MD;  Location: [facility]     LAPAROSCOPIC ASSISTED SIGMOID COLECTOMY N/A 8/14/2018    Procedure: LAPAROSCOPIC ASSISTED SIGMOID COLECTOMY;  Laparoscopic Hand Assisted Takedown of Splenic Flexure, Sigmoidectomy, Small Bowel Resection, Takedown of Small Bowel to Colon Fistula;  Surgeon: Rick Tran MD;  Location: [facility]     LAPAROSCOPIC HERNIORRHAPHY INGUINAL BILATERAL Bilateral 7/24/2015    Procedure: LAPAROSCOPIC HERNIORRHAPHY INGUINAL BILATERAL;  Surgeon: Bobby Mcconnell MD;  Location: [facility]     LAPAROSCOPIC INSERTION CATHETER PERITONEAL DIALYSIS N/A 6/22/2017    Procedure: LAPAROSCOPIC INSERTION CATHETER PERITONEAL DIALYSIS;  Laparoscopic Peritoneal Dialysis Catheter Placement - Anesthesia with block;  Surgeon: Esteban Arvizu MD;  Location: [facility]     PICC INSERTION Left 04/22/2018    5Fr - 49cm (3cm external), Basilic vein, low SVC     REMOVE CATHETER PERITONEAL Right 1/15/2018    Procedure: REMOVE CATHETER PERITONEAL;  Open Removal of Peritoneal Dialysis Catheter ;  Surgeon: Esteban Arvizu MD;  Location: [facility]     SIGMOIDOSCOPY FLEXIBLE N/A 11/21/2018    Procedure: Examination Under Anesthesia, Flexible Sigmoidoscopy and Polypectomy;  Surgeon: Rick Tran MD;  Location: [facility]     TRANSPLANT HEART RECIPIENT N/A 6/14/2018    Procedure: TRANSPLANT HEART RECIPIENT;  Median Sternotomy, on-pump oxygenator, Heart Transplant;  Surgeon: Rony Caputo MD;  Location: [facility]       Hx of Blood transfusions/reactions: no     Hx of abnormal bleeding or anti-platelet use: on ASA    Menstrual history: No LMP for male patient.    Steroid use in the last year: yes    Personal or FH with difficulty with Anesthesia:  no    Prior to Admission Medications  Current Outpatient  Prescriptions   Medication Sig Dispense Refill     albuterol (PROAIR HFA/PROVENTIL HFA/VENTOLIN HFA) 108 (90 Base) MCG/ACT inhaler Inhale 2 puffs into the lungs every 4 hours as needed for shortness of breath / dyspnea or wheezing       amLODIPine (NORVASC) 10 MG tablet Take 1 tablet (10 mg) by mouth daily 90 tablet 3     ASPIRIN 81 MG OR TABS Take 1 tablet (81 mg) by mouth at bedtime       atorvastatin (LIPITOR) 40 MG tablet Take 1 tablet (40 mg) by mouth daily (Patient taking differently: Take 40 mg by mouth daily Takes in the afternoon.) 90 tablet 3     biotin (BIOTIN 5000) 5 MG CAPS Take 5 mg by mouth daily 30 capsule 3     blood glucose monitoring (ACCU-CHEK FASTCLIX) lancets Use to test blood sugar 2-3 times daily or as directed.  Ok to substitute alternative if insurance prefers. 102 each 11     blood glucose monitoring (NO BRAND SPECIFIED) test strip Use to test blood sugar 2-3 times daily or as directed. 100 each 11     chlorhexidine (PERIDEX) 0.12 % solution Swish and spit 15 mLs in mouth 2 times daily 900 mL 0     ciprofloxacin (CIPRO) 500 MG tablet Take 1 tablet (500 mg) by mouth every 24 hours 14 tablet 0     cloNIDine (CATAPRES) 0.1 MG tablet Take 1 tablet (0.1 mg) by mouth At Bedtime 90 tablet 3     clotrimazole 10 MG kalpana Place 1 Kalpana (10 mg) inside cheek 4 times daily 120 Kalpana 11     fluticasone (FLONASE) 50 MCG/ACT spray Spray 1-2 sprays into both nostrils daily 16 g 11     gentian violet 1 % solution Take 0.5 mLs by mouth 4 times daily 30 mL 1     hydrALAZINE (APRESOLINE) 100 MG TABS tablet Take 1 tablet (100 mg) by mouth every 8 hours 90 tablet 11     insulin aspart (NOVOLOG PEN) 100 UNIT/ML injection Inject 1-7 Units Subcutaneous 4 times daily (before meals and nightly) 9 mL 3     insulin isophane human (HUMULIN N PEN) 100 UNIT/ML injection Inject 16 Units Subcutaneous every morning (before breakfast) 6 mL 11     insulin isophane human (HUMULIN N PEN) 100 UNIT/ML injection Inject 12 Units  Subcutaneous daily (with dinner) 3 mL 11     lisinopril (PRINIVIL/ZESTRIL) 5 MG tablet Take 1 tablet (5 mg) by mouth daily 90 tablet 3     loratadine (CLARITIN) 10 MG tablet Take 10 mg by mouth daily Reported on 5/3/2017       magnesium citrate solution Take 296 mLs by mouth See Admin Instructions Refer to surgery handout. 296 mL 0     melatonin 1 MG TABS tablet Take 2 tablets (2 mg) by mouth nightly as needed for sleep 120 tablet 1     metroNIDAZOLE (FLAGYL) 500 MG tablet Take 1 tablet (500 mg) by mouth every 8 hours for 1 day prior to surgery.  Take in conjunction with Neomycin Sulfate. 3 tablet 0     mycophenolate (GENERIC EQUIVALENT) 250 MG capsule Take 4 capsules (1,000 mg) by mouth 2 times daily 240 capsule 11     neomycin (MYCIFRADIN) 500 MG tablet Take 2 tablets (1,000 mg) by mouth every 8 hours for 1 day prior to surgery.  Take in conjunction with Flagyl. 6 tablet 0     NEPHROCAPS 1 MG capsule Take 1 capsule by mouth daily 120 capsule 0     ondansetron (ZOFRAN) 4 MG tablet Take 1 tablet (4 mg) by mouth every 6 hours as needed for nausea While taking neomycin and flagyl. 3 tablet 0     pantoprazole (PROTONIX) 40 MG EC tablet Take 1 tablet (40 mg) by mouth 2 times daily (before meals) 60 tablet 11     pentamidine (NEBUPENT) 300 MG neb solution Inhale 300 mg into the lungs every 28 days Last given 9/19/18 300 mg 0     polyethylene glycol (MIRALAX) packet Take 238 g by mouth See Admin Instructions One 8.3-ounce bottle (238 g).  Refer to surgery packet. 238 g 0     polyethylene glycol (MIRALAX/GLYCOLAX) Packet Take 17 g by mouth daily as needed for constipation 7 packet      predniSONE (DELTASONE) 5 MG tablet Take 1 tablet (5 mg) by mouth daily 60 tablet 0     simethicone (MYLICON) 80 MG chewable tablet Take 1 tablet (80 mg) by mouth every 6 hours as needed for cramping 180 tablet      sulfamethoxazole-trimethoprim (BACTRIM/SEPTRA) 400-80 MG per tablet Once per day on Tuesday, Thursday and Saturday. Take after  dialysis on dialysis days. 14 tablet 3     tacrolimus (GENERIC EQUIVALENT) 1 MG capsule Take 1 capsule (1 mg) by mouth 2 times daily 60 capsule 0     traMADol (ULTRAM) 50 MG tablet Take 1 tablet (50 mg) by mouth every 12 hours as needed for moderate pain 10 tablet 0     triamcinolone (KENALOG) 0.1 % ointment Apply topically 2 times daily 80 g 0     Urea 40 % CREA Externally apply topically daily 85 g 1       Allergies  Allergies   Allergen Reactions     Norco [Hydrocodone-Acetaminophen] Nausea and Vomiting     Cats      Throat tightness     Isosorbide Other (See Comments)     hypotension     Penicillins Hives     Seasonal Allergies      rhinitis     Shrimp      Throat closes        Social History Main Topics     Smoking status: Former Smoker     Packs/day: 1.00     Years: 19.00     Types: Cigarettes     Quit date: 8/18/1994     Family History  Refer to prior H & P dated 11/2018  ROS/MED HX    ENT/Pulmonary:     (+)sleep apnea, no CPAP   allergic rhinitis, , . .    Neurologic:  - neg neurologic ROS     Cardiovascular: Comment: Heart transplant 5/2018    (+) Dyslipidemia, hypertension  ECHO today with normal EF     METS/Exercise Tolerance:  >4 METS   Hematologic:     (+) History of blood clots pt is not anticoagulated, Other Hematologic Disorder-monoclonal gammanopathy of unknown significance      Musculoskeletal:  - neg musculoskeletal ROS       GI/Hepatic:     (+) GERD Asymptomatic on medication, Other GI/Hepatic diverticulits with colostomy      Renal/Genitourinary:     (+) chronic renal disease, type: ESRD, Pt requires dialysis, type: Hemodialysis, Pt has no history of transplant,       Endo:     (+) type II DM Using insulin not previously admitted for DM/DKA .      Psychiatric:  - neg psychiatric ROS       Infectious Disease:  - neg infectious disease ROS (+) Other Infectious Disease C. Diff      Malignancy:      - no malignancy   Other:    (+) No chance of pregnancy no H/O Chronic Pain,no other significant  "disability         The complete review of systems is negative other than noted in the HPI or here.       BP: 131/83 Pulse: 104     SpO2: 100 %         167 lbs 0 oz  5' 9\"   Body mass index is 24.66 kg/(m^2).       Physical Exam  Constitutional: Awake, alert, cooperative, no apparent distress, and appears stated age.  Eyes:  sclera clear, conjunctiva normal.  HENT: Normocephalic, oral pharynx with moist mucus membranes, good dentition.   Respiratory: Clear to auscultation bilaterally, no crackles or wheezing.  Cardiovascular: Regular rate and rhythm, normal S1 and S2, and no murmur noted.  Carotids +2, no bruits. Positive bilateral 1+ LE edema.   GI: Normal bowel sounds, soft, non-distended, non-tender, colostomy bag in place draining brown liquid  Lymph/Hematologic: No cervical lymphadenopathy and no supraclavicular lymphadenopathy.  Genitourinary:  deferred  Skin: Warm and dry.  No rashes at anticipated surgical site.   Musculoskeletal: Full ROM of neck. T   Neurologic: Awake, alert, oriented to name, place and time. Cranial nerves II-XII are grossly intact. Gait is normal.   Neuropsychiatric: Calm, cooperative. Normal affect.     Testing since last H & P:    CT 11/16/18  1. Postsurgical changes of sigmoidectomy, left lower quadrant end colostomy, and small bowel resection without evidence of extraluminal contrast, fistulous communication, or anastomosis breakdown  2. Stable postsurgical changes of heart transportation.   3. Stable appearance of the pancreatic head and neck IPMN's. Recommend  annual surveillance with MRI.  4. Overall significantly improved appearance of the peribronchial  groundglass opacities throughout the lungs with minimal residual right  upper lobe groundglass opacities. Recommend continued attention on  follow-up.     12/3/18 ECHO  Global and regional left ventricular function is hyperkinetic with an EF of  65-70%.  Right ventricular function, chamber size, wall motion, and thickness " are  normal.  No significant valvular abnormalities.  The peak velocity of the tricuspid regurgitant jet is not obtainable.  The inferior vena cava was normal in size with preserved respiratory  variability.  No pericardial effusion is present.     Compared to prior study dated 09/11/18, no significant interval change.  Right heart catheterization 12/3/18:  1. No evidence of PA HTN, no elevated RA, RV filling, and PCW pressures.  2. Preserved cardiac index.  3. Successful collection of 4 right ventricular endomyocardial biopsy samples.  ASSESSMENT and PLAN  Murray Nicholson is a 63 year old male scheduled to undergo lap colostomy take-down, possible ileostomy, with Dr. Rick Tran, on 12/11/18, in treatment after diverticulitis.     Pre-operative considerations include:      1.) CV: Functional status independent. Exercise tolerance > 4 METS. Orthotopic heart transplantation 6/2018; normal graft function, no evidence of rejection. Hypertension well controlled on hydralazine, lisinopril and Norvasc. Right heart catheterization 12/3/18 No evidence of PA HTN.   11% risk of major adverse cardiac event based on RCRI of 3 (insulin dependent diabetes, creatinine > 2, intraabdominal procedure).  ECHO and EKG in EMR. EF 65-70%.   No further cardiac evaluation indicated  Hold ACEI DOS, take rest of cardiac meds.  Hold ASA 7 days pre-op      2.) Pulmonary: 19 year smoking history. Quit 1994. No pulmonary dx, sx or meds.  SHEELA -untreated as he does not wear his CPAP  May be at risk for hypoxia      3.) Renal: End-stage renal disease. Dialysis 3 times a week Tues/THUR/SAT.  He has been tolerating it with no problems. internal jugular catheter  CMP today.  Surgical team will need to arrange for dialysis in hospital       4.) Endo: Type 2 Insulin dependent Diabetes: managed on NPH insulin and NovoLog as needed with a sliding scale. HGBa1c 6.1% today.   Hold short acting insulin DOS. Per [facility] guidelines for long acting Insulin  DOS      5.) Heme: Chronic anemia of renal ds. HGB today is 10.  VTE risk is low  Type and screen added to panel of labs ordered by GI      6.) GI: GERD on PPI with good control. Ostomy for takedown.   PONV: 2 pt (if 2 or >2 recommend antiemetic prophylaxis).    Patient was discussed with Dr Tavera.  Patient is optimized and no further evaluation is indicated    MAYTE Lopez  Preoperative Assessment Center  [facility]  Phone: [phone]  Fax: [phone]

## 2018-12-03 NOTE — PROGRESS NOTES
Discharge instructions given and pt voiced understanding. No scripts needed from pharmacy. LIJ site is soft and flat. No hematoma. Up walking in room. Urinating adequate amounts. No complaint of discomfort. Adequate for discharge. Discharged to home.

## 2018-12-03 NOTE — ANESTHESIA PREPROCEDURE EVALUATION
Anesthesia Evaluation     . Pt has had prior anesthetic. Type: General and MAC           ROS/MED HX    ENT/Pulmonary:     (+)sleep apnea, allergic rhinitis, , . .    Neurologic:  - neg neurologic ROS     Cardiovascular: Comment: Heart transplant 5/2018    (+) Dyslipidemia, hypertension----. : . . . :. . Previous cardiac testing       METS/Exercise Tolerance:  >4 METS   Hematologic:     (+) History of blood clots pt is not anticoagulated, Other Hematologic Disorder-monoclonal gammanopathy of unknown significance      Musculoskeletal:  - neg musculoskeletal ROS       GI/Hepatic:     (+) GERD Asymptomatic on medication, Other GI/Hepatic diverticulits with colostomy      Renal/Genitourinary:     (+) chronic renal disease, type: ESRD, Pt requires dialysis, type: Hemodialysis, Pt has no history of transplant,       Endo:     (+) type II DM Using insulin not previously admitted for DM/DKA .      Psychiatric:  - neg psychiatric ROS       Infectious Disease:  - neg infectious disease ROS (+) Other Infectious Disease C. Diff      Malignancy:      - no malignancy   Other:    (+) No chance of pregnancy no H/O Chronic Pain,no other significant disability                    Physical Exam  Normal systems: dental    Airway   Mallampati: I  TM distance: >3 FB  Neck ROM: full    Dental     Cardiovascular   Rhythm and rate: regular and abnormal      Pulmonary    breath sounds clear to auscultation    Other findings: LABS:  Lab Results      Component                Value               Date                      WBC                      5.0                 12/03/2018            Lab Results      Component                Value               Date                      RBC                      3.82                12/03/2018            Lab Results      Component                Value               Date                      HGB                      10.8                12/03/2018            Lab Results      Component                Value                Date                      HCT                      34.8                12/03/2018            Lab Results      Component                Value               Date                      MCV                      91                  12/03/2018            Lab Results      Component                Value               Date                      MCH                      28.3                12/03/2018            Lab Results      Component                Value               Date                      MCHC                     31.0                12/03/2018            Lab Results      Component                Value               Date                      RDW                      17.4                12/03/2018            Lab Results      Component                Value               Date                      PLT                      231                 12/03/2018            Last Comprehensive Metabolic Panel:  Sodium       Date                     Value               Ref Range           Status                12/03/2018               138                 133 - 144 mmol*     Final            ----------  Potassium       Date                     Value               Ref Range           Status                12/03/2018               4.2                 3.4 - 5.3 mmol*     Final            ----------  Chloride       Date                     Value               Ref Range           Status                12/03/2018               103                 94 - 109 mmol/L     Final            ----------  Carbon Dioxide       Date                     Value               Ref Range           Status                12/03/2018               24                  20 - 32 mmol/L      Final            ----------  Anion Gap       Date                     Value               Ref Range           Status                12/03/2018               11                  3 - 14 mmol/L       Final            ----------  Glucose       Date                     Value                Ref Range           Status                12/03/2018               192 (H)             70 - 99 mg/dL       Final            ----------  Urea Nitrogen       Date                     Value               Ref Range           Status                12/03/2018               42 (H)              7 - 30 mg/dL        Final            ----------  Creatinine       Date                     Value               Ref Range           Status                12/03/2018               6.15 (H)            0.66 - 1.25 mg*     Final            ----------  GFR Estimate       Date                     Value               Ref Range           Status                12/03/2018               9 (L)               >60 mL/min/1.7*     Final              Comment:    Non  GFR Calc  ----------  Calcium       Date                     Value               Ref Range           Status                12/03/2018               9.4                 8.5 - 10.1 mg/*     Final            ----------           PAC Discussion and Assessment    ASA Classification: 3  Case is suitable for:   Anesthetic techniques and relevant risks discussed: GA with regional block for post-op pain control  Invasive monitoring and risk discussed:   Types:   Possibility and Risk of blood transfusion discussed:   NPO instructions given:   Additional anesthetic preparation and risks discussed:   Needs early admission to pre-op area:   Other:     PAC Resident/NP Anesthesia Assessment:  63 year old male for lap colostomy take-down, possible ileostomy, with Dr. Rick Tran, on 12/11/18, in treatment after diverticulitis. PAC referral for risk assessment and optimization for anesthesia.   Pre-operative considerations include:     1.) CV: Functional status independent. Exercise tolerance > 4 METS. Orthotopic heart transplantation 6/2018; normal graft function, no evidence of rejection. Hypertension well controlled on hydralazine, lisinopril and Norvasc. Right heart  catheterization 12/3/18 No evidence of PA HTN.   11% risk of major adverse cardiac event based on RCRI of 3 (insulin dependent diabetes, creatinine > 2, intraabdominal procedure).  ECHO and EKG in EMR. EF 65-70%.   No further cardiac evaluation indicated  Hold ACEI DOS, take rest of cardiac meds.  Hold ASA 7 days pre-op     2.) Pulmonary: 19 year smoking history. Quit 1994. No pulmonary dx, sx or meds.  SHEELA -untreated as he does not wear his CPAP  May be at risk for hypoxia     3.) Renal: End-stage renal disease. Dialysis 3 times a week Tues/THUR/SAT.  He has been tolerating it with no problems. internal jugular catheter  CMP today.  Surgical team will need to arrange for dialysis in hospital      4.) Endo: Type 2 Insulin dependent Diabetes: managed on NPH insulin and NovoLog as needed with a sliding scale. HGBa1c 6.1% today.   Hold short acting insulin DOS. Per [facility] guidelines for long acting Insulin DOS     5.) Heme: Chronic anemia of renal ds. HGB today is 10.  VTE risk is low  Type and screen added to panel of labs ordered by GI     6.) GI: GERD on PPI with good control. Ostomy for takedown.   PONV: 2 pt (if 2 or >2 recommend antiemetic prophylaxis)    Patient was discussed with Dr Tavera.         Reviewed and Signed by PAC Mid-Level Provider/Resident  Mid-Level Provider/Resident: Michelle Holbrook PA-C  Date: 12/3/18  Time: 2:33 PM    Attending Anesthesiologist Anesthesia Assessment:  63 year old for lap colostomy take down, possible ileostomy in management of diverticulitis, S/P colectomy. Patietn is S/P heart transplant 6/2018, doing very well, right heart cath and biopsy today as well as echo show normal recovery. IDDM as well as ESRD on HD.     Patient/case discussed with EARL/resident; agree with above assessment. No need to see patient. Patient is appropriate for the planned procedure without further work-up or medical management.      Reviewed and Signed by PAC Anesthesiologist  Anesthesiologist:  ramsey  Date: 12/3/2018  Time:   Pass/Fail: Pass  Disposition:     PAC Pharmacist Assessment:        Pharmacist:   Date:   Time:      Anesthesia Plan      History & Physical Review  History and physical reviewed and following examination; no interval change.    ASA Status:  3 .    NPO Status:  > 8 hours    Plan for General and ETT with Intravenous and Propofol induction. Maintenance will be Balanced.    PONV prophylaxis:  Ondansetron (or other 5HT-3) and Dexamethasone or Solumedrol  Additional equipment: 2nd IV      Postoperative Care  Postoperative pain management:  Multi-modal analgesia.      Consents  Anesthetic plan, risks, benefits and alternatives discussed with:  Patient..                          .

## 2018-12-03 NOTE — NURSING NOTE
"Chief Complaint   Patient presents with     Clinic Care Coordination - Follow-up     Patient here for pre-surgery consult.        Vitals:    12/03/18 1303   BP: 131/83   BP Location: Left arm   Patient Position: Chair   Cuff Size: Adult Regular   Pulse: 104   SpO2: 100%   Weight: 167 lb 15.9 oz   Height: 5' 9\"       Body mass index is 24.81 kg/(m^2).    Medications and allergies reviewed by clinic this AM.    Shefali ALVAREZ LPN                        "

## 2018-12-03 NOTE — IP AVS SNAPSHOT
[address] [address]                                       After Visit Summary   12/3/2018    Murray Nicholson    MRN: [id number]           After Visit Summary Signature Page     I have received my discharge instructions, and my questions have been answered. I have discussed any challenges I see with this plan with the nurse or doctor.    ..........................................................................................................................................  Patient/Patient Representative Signature      ..........................................................................................................................................  Patient Representative Print Name and Relationship to Patient    ..................................................               ................................................  Date                                   Time    ..........................................................................................................................................  Reviewed by Signature/Title    ...................................................              ..............................................  Date                                               Time          22EPIC Rev 08/18

## 2018-12-04 ENCOUNTER — TELEPHONE (OUTPATIENT)
Dept: TRANSPLANT | Facility: CLINIC | Age: 63
End: 2018-12-04

## 2018-12-04 LAB
COPATH REPORT: NORMAL
EBV DNA # SPEC NAA+PROBE: NORMAL {COPIES}/ML
EBV DNA SPEC NAA+PROBE-LOG#: NORMAL {LOG_COPIES}/ML

## 2018-12-04 NOTE — NURSING NOTE
Transplant Coordinator Note  Reason for visit: 6 month follow up  Coordinator: Present Lillie Ulloa  Caregiver:  LUIS     Health concerns addressed today:  Pt seen in clinic with Dr Ernst for 6 month follow up. MD reviewed meds, labs, and testing (RHC and ECHO). FK level <3 - pt reports 12-hour trough and no missed doses. Dose increased, level to be checked before surgery next week. Teeth sensitive; requested to hold dental checks until off pred; FK stable. Plan to discontinue pred if biopsy NER and FK within goal. Urine output ~400 ml; cont on dialysis. Mouth sore much smaller; completed Cipro. Will cont to check CRPs, prn. Needs follow up appt with ENT.     Immunosuppressants:  MMF 1000 mg BID  FK 1 mg BID (goal 6-8) -> level <3. Dose increased.     Preventive care:  CMV - check prn (mismatch)  Bactrim  - cont until off Pred   Kalpana for thrush prevention while mouth is healing    Labs: Reviewed     Medication record reviewed and reconciled  Questions and concerns addressed.AVS reviewed; copy provided.    Pt verbalized an understanding of plan of care.     Patient Instructions   ~Please call your coordinator at [phone] with any questions or concerns.    ~Increase the Prograf to 2 mg twice daily and recheck labs THIS Friday 12/7 at 8AM. 12-hour trough on Prograf.    ~Coordinator will call with biopsy results.   ~ will arrange appt with ENT (Sarmad)    RTC in 2 months

## 2018-12-04 NOTE — TELEPHONE ENCOUNTER
----- Message from Lillie Ulloa RN sent at 12/4/2018  8:28 AM CST -----  Regarding: ENT Ondrey  Pt was suppose to have a follow up ENT appt - can you see if you can get him in either this week (if Ondrey is in) or the week of 12/17 - he is having surgery 12/11    Thanks!    Lillie

## 2018-12-04 NOTE — TELEPHONE ENCOUNTER
December 4, 2018: I left a message to let the patient know about this F/U appt with ENT:    Date: 12/18/2018 Status: Vale   Time: 7:30 AM Length: 15   Visit Type: UMP RETURN LEXII:     Provider: Tristan Bañuelos MD Department:  ENT GENERAL   Notes: 3 week f/u vale per [facility]. Pt has dialysis and needs to leave by 9a.     I also told him that I'm scheduling him for the next heart TX F/U appts on 2/1 (8-month), 3/29 (10-month), 6/10 (one year, day 1) and 6/11 (one year, day 2).    Roxana Foster  Post-Heart Transplant   [phone]    ----- Message -----     From: Lillie Ulloa RN     Sent: 12/4/2018   8:51 AM       To: Roxana Foster  Subject: Months 8 and 10                                  Please schedule Murray for months 8 and 10 follow up.   Biopsy and Allomap for month 8  Biopsy month 10.  Labs, biopsy, clinic visits.

## 2018-12-04 NOTE — LETTER
Coordinator: TONY Mcknight Schedule revised on 12/6/2018  CMV pos, EBV pending, Toxo pending MRN:  [id number] CMV neg, EBV pos, VZV pos   Physician: Klever Ernst MD  Transplant Date: 6/13/2018 Tues, Thurs and Sat - Dialysis days    Time Post  Transplant Procedures to Expect this Visit Visit Date    Appointment with JOSE Pérez Tuesday, 12/18/18    Telephone call with ronald Leach Friday, 12/21/18   32 Weeks  Month 8 Labs & Biopsy. Clinic appt with MD/NP. Molinamap. Friday, 2/1/19   40 Weeks  Month 10 Labs & Biopsy, CMV, EBV. Clinic appt with MD/LEWIS. Friday, 3/29/19   1st Annual 2 day visit: Extended Labs with DSAs, EBV, CMV, X-rays, DEXA, ECHO/MRI, Angiogram/RHC/IVUS/Biopsy, CPX, EKG.  Clinic appt with MD/NP. Nabil Donahueukstevew.  Monday, 6/10/18 &  Tuesday, 6/11/18   * Labs, including Prograf level, will be drawn in the [facility] Waiting Room prior to the biopsy unless indicated.  * Please take medication evening before to ensure 12-hour trough the next AM. (i.e. Labs at 8:30 AM, take medication the evening before at 8:30 PM.)  * Do not take medication in AM until after blood drawn.     Tuesday 12/18/18 7:30 am Appointment with Dr. Bañuelos, [facility]  3rd floor     Friday 12/21/18 9:30 am Telephone call with ronald Leach [facility]  3rd floor     Friday 2/1/19    (32 weeks, month 8) 8:00 am Labs and Allomap Blood Draw [facility]  1st floor    8:30 am Appointment with Cleo Marks NP [facility]  3rd floor    9:00 am Shuttle to the [facility]    [9:30 am] Heart biopsy  (Fast for three hours before testing) [facility] waiting room  2nd floor     Friday  3/29/19     (40 weeks, month 10) 8:00 am Labs [facility]  1st floor    8:30 am Appointment with Cleo Marks NP [facility]  3rd floor    9:00 am Shuttle to the Hospital    [9:30 am] Heart biopsy  (Fast for three  hours before testing) [facility]  2nd floor     Friday 6/7/18  (First Annual)  Day 1 of 2 [8:00 am] Fasting Labs and Allomap Blood Draw [facility]  2nd [facility]    [8:30 am] Angiogram  (Do not eat or drink anything after midnight; be sure to arrange a ) [facility]  2nd floor     Monday  6/10/18    (First Annual)  Day 2 of 2 [8:15 am]  On hold Cardiac MRI  (No caffeine for 12 hours before testing; fast for 3 hours before MRI) [facility]  2nd [facility]    [10:30 am]   On hold Cardiopulmonary Stress Test  (No caffeine for 12 hours before testing; do not eat/drink anything after midnight) [facility]  2nd floor    [12:30 pm] X-Rays [facility]  2nd [facility]    2:30 pm Either walk or take the shuttle to the [facility]    3:00 pm Appointment with Dr. Ernst, your cardiologist [facility]  3rd floor

## 2018-12-04 NOTE — PROGRESS NOTES
2018     Yahir Turcios MD    [facility]    [address] [place]  [address]       Ana Luisa Mcpherson MD    [facility]    [address] [facility] [address]    [place]  [address]       RE: Murray Nicholson   MRN: [id number]   : 1955      Dear Dr. Turcios and Dr. Mcpherson:      We had the pleasure of seeing Mr. Murray Nicholson for followup in our Cardiac Transplant Clinic at the [facility].  As you know, he is a very pleasant 63-year-old male who underwent orthotopic heart transplantation on 2018.  He is currently listed for kidney transplantation.  He had a very complex postoperative course.  His current problem list includes:     1.  Status post orthotopic heart transplantation.   2.  Neutropenia.   3.  Oral mucosal ulcer secondary to neutropenia with Klebsiella infection.   4.  Pseudomonas bacteremia, resolved.   5.  Perforation of the small bowel, status post small-bowel resection and ileostomy.   6.  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   7.  Hypertension.   8.  Hyperlipidemia.   9.  End-stage renal disease requiring hemodialysis.      He returns today for his 6 month follow up.  He is doing okay.  He has not no specific complaints.  He no longer has fever or chills.  Is tolerating the dialysis without any significant side effects.  His white blood cell has remained stable after restarting CellCept.  He was also seen by transplant infectious disease and he has completed his ciprofloxacin course. His CRP is WNL and his CMV PCR's has been negative. His WBC has remained stable on cellcept 1000 mg bid and prophylactic bactrim dose. He is scheduled for a retake of his colostomy second week of December.       Current Outpatient Prescriptions   Medication Sig     albuterol (PROAIR HFA/PROVENTIL HFA/VENTOLIN HFA) 108 (90 Base) MCG/ACT inhaler Inhale 2 puffs into the lungs every 4 hours as needed for shortness of breath / dyspnea or wheezing      amLODIPine (NORVASC) 10 MG tablet Take 1 tablet (10 mg) by mouth daily     ASPIRIN 81 MG OR TABS Take 1 tablet (81 mg) by mouth at bedtime     atorvastatin (LIPITOR) 40 MG tablet Take 1 tablet (40 mg) by mouth daily (Patient taking differently: Take 40 mg by mouth daily Takes in the afternoon.)     biotin (BIOTIN 5000) 5 MG CAPS Take 5 mg by mouth daily     blood glucose monitoring (ACCU-CHEK FASTCLIX) lancets Use to test blood sugar 2-3 times daily or as directed.  Ok to substitute alternative if insurance prefers.     blood glucose monitoring (NO BRAND SPECIFIED) test strip Use to test blood sugar 2-3 times daily or as directed.     chlorhexidine (PERIDEX) 0.12 % solution Swish and spit 15 mLs in mouth 2 times daily     cloNIDine (CATAPRES) 0.1 MG tablet Take 1 tablet (0.1 mg) by mouth At Bedtime     clotrimazole 10 MG kalpana Place 1 Kalpana (10 mg) inside cheek 4 times daily     fluticasone (FLONASE) 50 MCG/ACT spray Spray 1-2 sprays into both nostrils daily     gentian violet 1 % solution Take 0.5 mLs by mouth 4 times daily     hydrALAZINE (APRESOLINE) 100 MG TABS tablet Take 1 tablet (100 mg) by mouth every 8 hours     insulin aspart (NOVOLOG PEN) 100 UNIT/ML injection Inject 1-7 Units Subcutaneous 4 times daily (before meals and nightly)     insulin isophane human (HUMULIN N PEN) 100 UNIT/ML injection Inject 16 Units Subcutaneous every morning (before breakfast)     insulin isophane human (HUMULIN N PEN) 100 UNIT/ML injection Inject 12 Units Subcutaneous daily (with dinner)     lisinopril (PRINIVIL/ZESTRIL) 5 MG tablet Take 1 tablet (5 mg) by mouth daily     loratadine (CLARITIN) 10 MG tablet Take 10 mg by mouth daily Reported on 5/3/2017     magnesium citrate solution Take 296 mLs by mouth See Admin Instructions Refer to surgery handout.     melatonin 1 MG TABS tablet Take 2 tablets (2 mg) by mouth nightly as needed for sleep     metroNIDAZOLE (FLAGYL) 500 MG tablet Take 1 tablet (500 mg) by mouth every 8  hours for 1 day prior to surgery.  Take in conjunction with Neomycin Sulfate.     mycophenolate (GENERIC EQUIVALENT) 250 MG capsule Take 4 capsules (1,000 mg) by mouth 2 times daily     neomycin (MYCIFRADIN) 500 MG tablet Take 2 tablets (1,000 mg) by mouth every 8 hours for 1 day prior to surgery.  Take in conjunction with Flagyl.     NEPHROCAPS 1 MG capsule Take 1 capsule by mouth daily     ondansetron (ZOFRAN) 4 MG tablet Take 1 tablet (4 mg) by mouth every 6 hours as needed for nausea While taking neomycin and flagyl.     pantoprazole (PROTONIX) 40 MG EC tablet Take 1 tablet (40 mg) by mouth 2 times daily (before meals)     pentamidine (NEBUPENT) 300 MG neb solution Inhale 300 mg into the lungs every 28 days Last given 9/19/18     polyethylene glycol (MIRALAX) packet Take 238 g by mouth See Admin Instructions One 8.3-ounce bottle (238 g).  Refer to surgery packet.     polyethylene glycol (MIRALAX/GLYCOLAX) Packet Take 17 g by mouth daily as needed for constipation     predniSONE (DELTASONE) 5 MG tablet Take 1 tablet (5 mg) by mouth daily     simethicone (MYLICON) 80 MG chewable tablet Take 1 tablet (80 mg) by mouth every 6 hours as needed for cramping     sulfamethoxazole-trimethoprim (BACTRIM/SEPTRA) 400-80 MG per tablet Once per day on Tuesday, Thursday and Saturday. Take after dialysis on dialysis days.     tacrolimus (GENERIC EQUIVALENT) 1 MG capsule Take 2 capsules (2 mg) by mouth 2 times daily     traMADol (ULTRAM) 50 MG tablet Take 1 tablet (50 mg) by mouth every 12 hours as needed for moderate pain     triamcinolone (KENALOG) 0.1 % ointment Apply topically 2 times daily     Urea 40 % CREA Externally apply topically daily     No current facility-administered medications for this visit.         REVIEW OF SYSTEMS:  A detailed 10-point review of systems was obtained as described in History of Present Illness.  All other systems are reviewed and are negative.      PHYSICAL EXAMINATION:   GENERAL:  He was  "comfortable.  He was in no apparent distress.   /83  Pulse 104  Ht 1.753 m (5' 9\")  Wt 75.8 kg (167 lb)  SpO2 100%  BMI 24.66 kg/m2  He had no pallor or cyanosis or jaundice.  The lesion on his hard palate is healing nicely.  Cardiac auscultation revealed normal S1-S2 with no murmur rub or gallop.  Auscultation of his lungs reveal equal air entry on both sides with no added sounds.  His abdomen was soft with no wounds and no tenderness no rigidity no guarding.  Colostomy site looks good.  He had no focal neurological deficit.  His extremities show no edema.    Testing:     Echo (12/2018):  Global and regional left ventricular function is hyperkinetic with an EF of  65-70%.  Right ventricular function, chamber size, wall motion, and thickness are  normal.  No significant valvular abnormalities.  The peak velocity of the tricuspid regurgitant jet is not obtainable.  The inferior vena cava was normal in size with preserved respiratory  variability.  No pericardial effusion is present.     Compared to prior study dated 09/11/18, no significant interval change.    RHC (12/2018):   RA 2  RV 23/2  PA 23/10 (13)  PCWP 2  PA % 77%  CO/CI 7.9 /4.1  PVR 1.4    Biopsy (12/2018):  FINAL DIAGNOSIS:   Heart, allograft, right ventricle, endomyocardial biopsy:        - Acute cellular rejection: No rejection, Grade 0R (ISHLT 2004)   - Antibody-mediated rejection: No evidence of antibody-mediated rejection   - C3d and C4d are negative (see comment)     DSA:     Lab Results   Component Value Date    SAITESTMET [facility] 11/13/2018    SAICELL Class I 11/13/2018    [id number] None 11/13/2018    [id number] None 11/13/2018    SAIREPCOM  11/13/2018      Test performed by modified procedure. Serum heat inactivated and tested   by a modified ([place]) protocol including fetal calf serum addition.   High-risk, mfi >3,000. Mod-risk, mfi 500-3,000.       Lab Results   Component Value Date    SAIITESTME [facility] 11/13/2018    SAIICELL Class II " 11/13/2018    [id number] None 11/13/2018    [id number] None 11/13/2018    SAIIREPCOM  11/13/2018      Test performed by modified procedure. Serum heat inactivated and tested   by a modified ([place]) protocol including fetal calf serum addition.   High-risk, mfi >3,000. Mod-risk, mfi 500-3,000.         IMMUNOSUPPRESSANT LEVELS:  Lab Results   Component Value Date    TACROL <3.0 (L) 12/03/2018    DOSTAC Not Provided 12/03/2018       Lab Results   Component Value Date    CSPEC Plasma, EDTA anticoagulant 12/03/2018       CBC RESULTS:   Recent Labs   Lab Test  12/03/18   0845   WBC  5.0   RBC  3.82*   HGB  10.8*   HCT  34.8*   MCV  91   MCH  28.3   MCHC  31.0*   RDW  17.4*   PLT  231       Recent Labs   Lab Test  12/03/18   0845  11/26/18   1322   NA  138  137   POTASSIUM  4.2  4.6   CHLORIDE  103  103   CO2  24  23   ANIONGAP  11  10   GLC  192*  184*   BUN  42*  47*   CR  6.15*  6.45*   TRINI  9.4  9.4       Liver Function Studies - Liver Function Studies -   Recent Labs   Lab Test  12/03/18   0845   PROTTOTAL  6.8   ALBUMIN  3.2*   BILITOTAL  0.5   ALKPHOS  181*   AST  19   ALT  19       Recent Labs   Lab Test  12/03/18   0845  09/10/18   0640   07/18/18   0841   08/10/15   0729  04/09/15   0900   CHOL  154   --    --   175   < >  198  182   HDL  65   --    --   92   < >  40*  46   LDL  49   --    --   66   < >  Cannot estimate LDL when triglyceride exceeds 400 mg/dL  Cannot estimate LDL when triglyceride exceeds 400 mg/dL  85   TRIG  201*  71   < >  88   < >  421*  426*   CHOLHDLRATIO   --    --    --    --    --   5.0  4.0    < > = values in this interval not displayed.       No components found for: CK  Lab Results   Component Value Date    MAG 1.3 (L) 12/03/2018     Lab Results   Component Value Date    A1C 6.1 (H) 12/03/2018     Lab Results   Component Value Date    PHOS 3.0 12/03/2018     PSA   Date Value Ref Range Status   04/16/2018 2.90 0 - 4 ug/L Final     Comment:     Assay Method:  Chemiluminescence using  Siemens Vista analyzer       CMV PCR - negative  EBV - Negative.     ASSESSMENT AND PLAN:       Mr. Murray Nicholson is a 63-year-old male status post orthotopic heart transplantation in June with a very complex postoperative course who returns today for followup.     1.  Orthotopic heart transplantation.  Mr. Nicholson has normal graft function.  He has not had any evidence of rejection, fortunately, so far.  His biopsies have been negative.  His graft systolic function has been normal.  Will discontinue prednisone if his  biopsy from today is negative and his Prograf is therapeutic.  We will continue him on CellCept to 1000 mg twice a day and tacrolimus with a target goal of 6-8 given his multiple recent infections.  FK dose increased we will recheck level in couple of days. He is on aspirin and lipitor for primary prevention of allograft vasculopathy.     2.  Hypertension.  His blood pressure is currently controlled on hydralazine 100 mg 3 times a day, lisinopril 5 mg, Norvasc 10 mg, and Clonidine 0.1 mg daily.     3.  End-stage renal disease.  He continues to remain on dialysis 3 times a week.  He has been tolerating it with no problems.  His dry weight has recently been increased.  With that, he is not having any lightheadedness or dizziness.  He is getting vein mapping in anticipation of an AV fistula placement.    4.  Neutropenia - resolved. On low-dose of CellCept.  He remains of Valcyte, which he no longer requires as per the protocol . Will discontinue bactrim when we stop his prednisone.       5.  High risk for CMV.  He is off of Valcyte secondary to neutropenia.  Will check CMV as per protocol. He has completed 6 months of close survelliance.     6.  Oral ulcer with Klebsiella - completed extended course of Cipro. Healing well. His CRP is within normal limits. Will schedule follow-up appointment with ENT.  Will schedule follow-up appointment with infectious disease as needed.  He is on the chlorhexidine swish  and swallow twice a day.      7.  Perforated bowel, status post colostomy, doing well, tolerating diet, normal ostomy site output.  Plan for takedown of his colostomy second week of December. Will plan for intravenous tacrolimus and Cellcept if he is not able to tolerate PO during the daniella-operative period.       8.  Diabetes.  He is on NPH insulin.  He also uses NovoLog as needed with a sliding scale.       RTC as per protocol. He will call in the interim if he has any worsening symptoms.       Sincerely,   Klever Ernst MD   [facility]  Heart Failure, Transplant, and Mechanical Circulatory Support Cardiology   Cardiovascular Division  [facility]   [phone]

## 2018-12-05 LAB
ALLOMAP SCORE (EXTERNAL): 27
NEGATIVE PREDICTIVE VALUE PERCENT (EXTERNAL): 98.7 %
POSITIVE PREDICTIVE VALUE PERCENT (EXTERNAL): 3.4 %

## 2018-12-06 NOTE — TELEPHONE ENCOUNTER
December 6, 2018: I spoke with Murray to review the appointment on 12/18 and telephone call time change on 12/21. I also asked him if the dates for the next F/U appts were okay, but he was in the car. I assured him I would send a schedule and he could call to let me know if anything needs to be changed.    Roxana Lebron  Post-Heart Transplant   [phone]

## 2018-12-07 DIAGNOSIS — Z94.1 HEART REPLACED BY TRANSPLANT (H): ICD-10-CM

## 2018-12-07 DIAGNOSIS — Z93.3 COLOSTOMY STATUS (H): ICD-10-CM

## 2018-12-07 DIAGNOSIS — K57.81 DIVERTICULITIS OF INTESTINE, PART UNSPECIFIED, WITH PERFORATION AND ABSCESS WITH BLEEDING: ICD-10-CM

## 2018-12-07 DIAGNOSIS — Z94.1 HEART TRANSPLANTED (H): ICD-10-CM

## 2018-12-07 LAB
ALBUMIN SERPL-MCNC: 2.9 G/DL (ref 3.4–5)
ALP SERPL-CCNC: 190 U/L (ref 40–150)
ALT SERPL W P-5'-P-CCNC: 16 U/L (ref 0–70)
ANION GAP SERPL CALCULATED.3IONS-SCNC: 11 MMOL/L (ref 3–14)
AST SERPL W P-5'-P-CCNC: 15 U/L (ref 0–45)
BILIRUB SERPL-MCNC: 0.6 MG/DL (ref 0.2–1.3)
BUN SERPL-MCNC: 21 MG/DL (ref 7–30)
CALCIUM SERPL-MCNC: 8.8 MG/DL (ref 8.5–10.1)
CHLORIDE SERPL-SCNC: 103 MMOL/L (ref 94–109)
CO2 SERPL-SCNC: 25 MMOL/L (ref 20–32)
CREAT SERPL-MCNC: 4.27 MG/DL (ref 0.66–1.25)
CRP SERPL-MCNC: 12 MG/L (ref 0–8)
ERYTHROCYTE [DISTWIDTH] IN BLOOD BY AUTOMATED COUNT: 16.8 % (ref 10–15)
GFR SERPL CREATININE-BSD FRML MDRD: 14 ML/MIN/1.7M2
GLUCOSE SERPL-MCNC: 155 MG/DL (ref 70–99)
HCT VFR BLD AUTO: 31.7 % (ref 40–53)
HGB BLD-MCNC: 9.9 G/DL (ref 13.3–17.7)
MCH RBC QN AUTO: 28 PG (ref 26.5–33)
MCHC RBC AUTO-ENTMCNC: 31.2 G/DL (ref 31.5–36.5)
MCV RBC AUTO: 90 FL (ref 78–100)
PLATELET # BLD AUTO: 201 10E9/L (ref 150–450)
POTASSIUM SERPL-SCNC: 3.9 MMOL/L (ref 3.4–5.3)
PREALB SERPL IA-MCNC: 25 MG/DL (ref 15–45)
PROT SERPL-MCNC: 6.4 G/DL (ref 6.8–8.8)
RBC # BLD AUTO: 3.53 10E12/L (ref 4.4–5.9)
SODIUM SERPL-SCNC: 139 MMOL/L (ref 133–144)
TACROLIMUS BLD-MCNC: 6.7 UG/L (ref 5–15)
TME LAST DOSE: NORMAL H
WBC # BLD AUTO: 4 10E9/L (ref 4–11)

## 2018-12-07 PROCEDURE — 80197 ASSAY OF TACROLIMUS: CPT | Performed by: INTERNAL MEDICINE

## 2018-12-10 DIAGNOSIS — Z94.1 HEART TRANSPLANTED (H): Primary | ICD-10-CM

## 2018-12-11 ENCOUNTER — ANESTHESIA (OUTPATIENT)
Dept: SURGERY | Facility: CLINIC | Age: 63
DRG: 329 | End: 2018-12-11
Payer: MEDICARE

## 2018-12-11 ENCOUNTER — HOSPITAL ENCOUNTER (INPATIENT)
Facility: CLINIC | Age: 63
LOS: 6 days | Discharge: HOME HEALTH | DRG: 329 | End: 2018-12-17
Attending: COLON & RECTAL SURGERY | Admitting: COLON & RECTAL SURGERY
Payer: MEDICARE

## 2018-12-11 DIAGNOSIS — Z94.1 HEART TRANSPLANT RECIPIENT (H): ICD-10-CM

## 2018-12-11 DIAGNOSIS — Z93.2 S/P ILEOSTOMY (H): Primary | ICD-10-CM

## 2018-12-11 PROBLEM — Z93.3 COLOSTOMY STATUS (H): Status: ACTIVE | Noted: 2018-12-11

## 2018-12-11 LAB
ABO + RH BLD: NORMAL
ABO + RH BLD: NORMAL
ANION GAP SERPL CALCULATED.3IONS-SCNC: 11 MMOL/L (ref 3–14)
BASE EXCESS BLDA CALC-SCNC: 1 MMOL/L
BLD GP AB SCN SERPL QL: NORMAL
BLOOD BANK CMNT PATIENT-IMP: NORMAL
BLOOD BANK CMNT PATIENT-IMP: NORMAL
BUN SERPL-MCNC: 16 MG/DL (ref 7–30)
CA-I BLD-MCNC: 4.9 MG/DL (ref 4.4–5.2)
CALCIUM SERPL-MCNC: 8.3 MG/DL (ref 8.5–10.1)
CHLORIDE SERPL-SCNC: 102 MMOL/L (ref 94–109)
CO2 SERPL-SCNC: 23 MMOL/L (ref 20–32)
CREAT SERPL-MCNC: 4.43 MG/DL (ref 0.66–1.25)
GFR SERPL CREATININE-BSD FRML MDRD: 14 ML/MIN/1.7M2
GLUCOSE BLD-MCNC: 83 MG/DL (ref 70–99)
GLUCOSE BLDC GLUCOMTR-MCNC: 110 MG/DL (ref 70–99)
GLUCOSE BLDC GLUCOMTR-MCNC: 146 MG/DL (ref 70–99)
GLUCOSE BLDC GLUCOMTR-MCNC: 81 MG/DL (ref 70–99)
GLUCOSE BLDC GLUCOMTR-MCNC: 96 MG/DL (ref 70–99)
GLUCOSE SERPL-MCNC: 117 MG/DL (ref 70–99)
HCO3 BLD-SCNC: 25 MMOL/L (ref 21–28)
HGB BLD-MCNC: 8.8 G/DL (ref 13.3–17.7)
HGB BLD-MCNC: 9.2 G/DL (ref 13.3–17.7)
MAGNESIUM SERPL-MCNC: 1.2 MG/DL (ref 1.6–2.3)
O2/TOTAL GAS SETTING VFR VENT: 55 %
PCO2 BLD: 39 MM HG (ref 35–45)
PH BLD: 7.42 PH (ref 7.35–7.45)
PO2 BLD: 243 MM HG (ref 80–105)
POTASSIUM BLD-SCNC: 3.2 MMOL/L (ref 3.4–5.3)
POTASSIUM SERPL-SCNC: 3.5 MMOL/L (ref 3.4–5.3)
POTASSIUM SERPL-SCNC: 3.7 MMOL/L (ref 3.4–5.3)
SODIUM BLD-SCNC: 135 MMOL/L (ref 133–144)
SODIUM SERPL-SCNC: 136 MMOL/L (ref 133–144)
SPECIMEN EXP DATE BLD: NORMAL

## 2018-12-11 PROCEDURE — C9113 INJ PANTOPRAZOLE SODIUM, VIA: HCPCS | Performed by: COLON & RECTAL SURGERY

## 2018-12-11 PROCEDURE — 25000128 H RX IP 250 OP 636: Performed by: ANESTHESIOLOGY

## 2018-12-11 PROCEDURE — 37000009 ZZH ANESTHESIA TECHNICAL FEE, EACH ADDTL 15 MIN: Performed by: COLON & RECTAL SURGERY

## 2018-12-11 PROCEDURE — 88304 TISSUE EXAM BY PATHOLOGIST: CPT | Performed by: COLON & RECTAL SURGERY

## 2018-12-11 PROCEDURE — 40000014 ZZH STATISTIC ARTERIAL MONITORING DAILY

## 2018-12-11 PROCEDURE — 36415 COLL VENOUS BLD VENIPUNCTURE: CPT | Performed by: ANESTHESIOLOGY

## 2018-12-11 PROCEDURE — 25000565 ZZH ISOFLURANE, EA 15 MIN: Performed by: COLON & RECTAL SURGERY

## 2018-12-11 PROCEDURE — 25000131 ZZH RX MED GY IP 250 OP 636 PS 637: Mod: GY | Performed by: COLON & RECTAL SURGERY

## 2018-12-11 PROCEDURE — 82947 ASSAY GLUCOSE BLOOD QUANT: CPT | Performed by: ANESTHESIOLOGY

## 2018-12-11 PROCEDURE — 36000064 ZZH SURGERY LEVEL 4 EA 15 ADDTL MIN - UMMC: Performed by: COLON & RECTAL SURGERY

## 2018-12-11 PROCEDURE — 25000128 H RX IP 250 OP 636: Performed by: COLON & RECTAL SURGERY

## 2018-12-11 PROCEDURE — 82330 ASSAY OF CALCIUM: CPT | Performed by: ANESTHESIOLOGY

## 2018-12-11 PROCEDURE — 82803 BLOOD GASES ANY COMBINATION: CPT | Performed by: ANESTHESIOLOGY

## 2018-12-11 PROCEDURE — 0DN84ZZ RELEASE SMALL INTESTINE, PERCUTANEOUS ENDOSCOPIC APPROACH: ICD-10-PCS | Performed by: COLON & RECTAL SURGERY

## 2018-12-11 PROCEDURE — 84132 ASSAY OF SERUM POTASSIUM: CPT | Performed by: ANESTHESIOLOGY

## 2018-12-11 PROCEDURE — 37000008 ZZH ANESTHESIA TECHNICAL FEE, 1ST 30 MIN: Performed by: COLON & RECTAL SURGERY

## 2018-12-11 PROCEDURE — 25000132 ZZH RX MED GY IP 250 OP 250 PS 637: Mod: GY | Performed by: COLON & RECTAL SURGERY

## 2018-12-11 PROCEDURE — P9041 ALBUMIN (HUMAN),5%, 50ML: HCPCS | Performed by: NURSE ANESTHETIST, CERTIFIED REGISTERED

## 2018-12-11 PROCEDURE — 0DSN4ZZ REPOSITION SIGMOID COLON, PERCUTANEOUS ENDOSCOPIC APPROACH: ICD-10-PCS | Performed by: COLON & RECTAL SURGERY

## 2018-12-11 PROCEDURE — 80048 BASIC METABOLIC PNL TOTAL CA: CPT | Performed by: COLON & RECTAL SURGERY

## 2018-12-11 PROCEDURE — A9270 NON-COVERED ITEM OR SERVICE: HCPCS | Mod: GY | Performed by: COLON & RECTAL SURGERY

## 2018-12-11 PROCEDURE — 40000171 ZZH STATISTIC PRE-PROCEDURE ASSESSMENT III: Performed by: COLON & RECTAL SURGERY

## 2018-12-11 PROCEDURE — 0DNW4ZZ RELEASE PERITONEUM, PERCUTANEOUS ENDOSCOPIC APPROACH: ICD-10-PCS | Performed by: COLON & RECTAL SURGERY

## 2018-12-11 PROCEDURE — C9290 INJ, BUPIVACAINE LIPOSOME: HCPCS | Performed by: ANESTHESIOLOGY

## 2018-12-11 PROCEDURE — 25000128 H RX IP 250 OP 636: Performed by: NURSE ANESTHETIST, CERTIFIED REGISTERED

## 2018-12-11 PROCEDURE — 85018 HEMOGLOBIN: CPT | Performed by: COLON & RECTAL SURGERY

## 2018-12-11 PROCEDURE — 83735 ASSAY OF MAGNESIUM: CPT | Performed by: COLON & RECTAL SURGERY

## 2018-12-11 PROCEDURE — 71000015 ZZH RECOVERY PHASE 1 LEVEL 2 EA ADDTL HR: Performed by: COLON & RECTAL SURGERY

## 2018-12-11 PROCEDURE — 40000275 ZZH STATISTIC RCP TIME EA 10 MIN

## 2018-12-11 PROCEDURE — 25000125 ZZHC RX 250: Performed by: NURSE ANESTHETIST, CERTIFIED REGISTERED

## 2018-12-11 PROCEDURE — 0D1B0Z4 BYPASS ILEUM TO CUTANEOUS, OPEN APPROACH: ICD-10-PCS | Performed by: COLON & RECTAL SURGERY

## 2018-12-11 PROCEDURE — C1765 ADHESION BARRIER: HCPCS | Performed by: COLON & RECTAL SURGERY

## 2018-12-11 PROCEDURE — 36000062 ZZH SURGERY LEVEL 4 1ST 30 MIN - UMMC: Performed by: COLON & RECTAL SURGERY

## 2018-12-11 PROCEDURE — 40000196 ZZH STATISTIC RAPCV CVP MONITORING

## 2018-12-11 PROCEDURE — 0DJD8ZZ INSPECTION OF LOWER INTESTINAL TRACT, VIA NATURAL OR ARTIFICIAL OPENING ENDOSCOPIC: ICD-10-PCS | Performed by: COLON & RECTAL SURGERY

## 2018-12-11 PROCEDURE — 71000014 ZZH RECOVERY PHASE 1 LEVEL 2 FIRST HR: Performed by: COLON & RECTAL SURGERY

## 2018-12-11 PROCEDURE — 27210794 ZZH OR GENERAL SUPPLY STERILE: Performed by: COLON & RECTAL SURGERY

## 2018-12-11 PROCEDURE — 00000146 ZZHCL STATISTIC GLUCOSE BY METER IP

## 2018-12-11 PROCEDURE — 84295 ASSAY OF SERUM SODIUM: CPT | Performed by: ANESTHESIOLOGY

## 2018-12-11 PROCEDURE — 21400006 ZZH R&B CCU INTERMEDIATE UMMC

## 2018-12-11 RX ORDER — SODIUM CHLORIDE 9 MG/ML
INJECTION, SOLUTION INTRAVENOUS CONTINUOUS PRN
Status: DISCONTINUED | OUTPATIENT
Start: 2018-12-11 | End: 2018-12-11

## 2018-12-11 RX ORDER — LIDOCAINE 40 MG/G
CREAM TOPICAL
Status: DISCONTINUED | OUTPATIENT
Start: 2018-12-11 | End: 2018-12-17 | Stop reason: HOSPADM

## 2018-12-11 RX ORDER — CIPROFLOXACIN 2 MG/ML
400 INJECTION, SOLUTION INTRAVENOUS EVERY 24 HOURS
Status: DISPENSED | OUTPATIENT
Start: 2018-12-11 | End: 2018-12-12

## 2018-12-11 RX ORDER — NALOXONE HYDROCHLORIDE 0.4 MG/ML
.1-.4 INJECTION, SOLUTION INTRAMUSCULAR; INTRAVENOUS; SUBCUTANEOUS
Status: DISCONTINUED | OUTPATIENT
Start: 2018-12-11 | End: 2018-12-11 | Stop reason: HOSPADM

## 2018-12-11 RX ORDER — SULFAMETHOXAZOLE AND TRIMETHOPRIM 400; 80 MG/1; MG/1
1 TABLET ORAL
Status: DISCONTINUED | OUTPATIENT
Start: 2018-12-11 | End: 2018-12-17 | Stop reason: HOSPADM

## 2018-12-11 RX ORDER — ONDANSETRON 4 MG/1
4 TABLET, ORALLY DISINTEGRATING ORAL EVERY 30 MIN PRN
Status: DISCONTINUED | OUTPATIENT
Start: 2018-12-11 | End: 2018-12-11 | Stop reason: HOSPADM

## 2018-12-11 RX ORDER — ALBUTEROL SULFATE 90 UG/1
2 AEROSOL, METERED RESPIRATORY (INHALATION) EVERY 4 HOURS PRN
Status: DISCONTINUED | OUTPATIENT
Start: 2018-12-11 | End: 2018-12-17 | Stop reason: HOSPADM

## 2018-12-11 RX ORDER — SODIUM CHLORIDE, SODIUM LACTATE, POTASSIUM CHLORIDE, CALCIUM CHLORIDE 600; 310; 30; 20 MG/100ML; MG/100ML; MG/100ML; MG/100ML
INJECTION, SOLUTION INTRAVENOUS CONTINUOUS
Status: DISCONTINUED | OUTPATIENT
Start: 2018-12-11 | End: 2018-12-11 | Stop reason: HOSPADM

## 2018-12-11 RX ORDER — GRANISETRON HYDROCHLORIDE 1 MG/ML
1 INJECTION INTRAVENOUS ONCE
Status: COMPLETED | OUTPATIENT
Start: 2018-12-11 | End: 2018-12-11

## 2018-12-11 RX ORDER — ALBUMIN, HUMAN INJ 5% 5 %
SOLUTION INTRAVENOUS CONTINUOUS PRN
Status: DISCONTINUED | OUTPATIENT
Start: 2018-12-11 | End: 2018-12-11

## 2018-12-11 RX ORDER — FLUMAZENIL 0.1 MG/ML
0.2 INJECTION, SOLUTION INTRAVENOUS
Status: DISCONTINUED | OUTPATIENT
Start: 2018-12-11 | End: 2018-12-11 | Stop reason: HOSPADM

## 2018-12-11 RX ORDER — GLYCOPYRROLATE 0.2 MG/ML
INJECTION, SOLUTION INTRAMUSCULAR; INTRAVENOUS PRN
Status: DISCONTINUED | OUTPATIENT
Start: 2018-12-11 | End: 2018-12-11

## 2018-12-11 RX ORDER — HYDROMORPHONE HYDROCHLORIDE 1 MG/ML
.3-.5 INJECTION, SOLUTION INTRAMUSCULAR; INTRAVENOUS; SUBCUTANEOUS EVERY 10 MIN PRN
Status: DISCONTINUED | OUTPATIENT
Start: 2018-12-11 | End: 2018-12-11 | Stop reason: HOSPADM

## 2018-12-11 RX ORDER — TACROLIMUS 1 MG/1
2 CAPSULE ORAL 2 TIMES DAILY
Status: DISCONTINUED | OUTPATIENT
Start: 2018-12-11 | End: 2018-12-12

## 2018-12-11 RX ORDER — ACETAMINOPHEN 325 MG/1
975 TABLET ORAL ONCE
Status: COMPLETED | OUTPATIENT
Start: 2018-12-11 | End: 2018-12-11

## 2018-12-11 RX ORDER — MAGNESIUM SULFATE HEPTAHYDRATE 40 MG/ML
4 INJECTION, SOLUTION INTRAVENOUS EVERY 4 HOURS PRN
Status: DISCONTINUED | OUTPATIENT
Start: 2018-12-11 | End: 2018-12-17 | Stop reason: HOSPADM

## 2018-12-11 RX ORDER — ACETAMINOPHEN 500 MG
1000 TABLET ORAL 4 TIMES DAILY
Status: DISCONTINUED | OUTPATIENT
Start: 2018-12-11 | End: 2018-12-17 | Stop reason: HOSPADM

## 2018-12-11 RX ORDER — POTASSIUM CHLORIDE 7.45 MG/ML
INJECTION INTRAVENOUS PRN
Status: DISCONTINUED | OUTPATIENT
Start: 2018-12-11 | End: 2018-12-11

## 2018-12-11 RX ORDER — MYCOPHENOLATE MOFETIL 250 MG/1
1000 CAPSULE ORAL 2 TIMES DAILY
Status: DISCONTINUED | OUTPATIENT
Start: 2018-12-11 | End: 2018-12-17 | Stop reason: HOSPADM

## 2018-12-11 RX ORDER — FENTANYL CITRATE 50 UG/ML
25-50 INJECTION, SOLUTION INTRAMUSCULAR; INTRAVENOUS EVERY 5 MIN PRN
Status: DISCONTINUED | OUTPATIENT
Start: 2018-12-11 | End: 2018-12-11 | Stop reason: HOSPADM

## 2018-12-11 RX ORDER — NALOXONE HYDROCHLORIDE 0.4 MG/ML
.1-.4 INJECTION, SOLUTION INTRAMUSCULAR; INTRAVENOUS; SUBCUTANEOUS
Status: DISCONTINUED | OUTPATIENT
Start: 2018-12-11 | End: 2018-12-17 | Stop reason: HOSPADM

## 2018-12-11 RX ORDER — FLUTICASONE PROPIONATE 50 MCG
1-2 SPRAY, SUSPENSION (ML) NASAL DAILY
Status: DISCONTINUED | OUTPATIENT
Start: 2018-12-11 | End: 2018-12-17 | Stop reason: HOSPADM

## 2018-12-11 RX ORDER — NICOTINE POLACRILEX 4 MG
15-30 LOZENGE BUCCAL
Status: DISCONTINUED | OUTPATIENT
Start: 2018-12-11 | End: 2018-12-17 | Stop reason: HOSPADM

## 2018-12-11 RX ORDER — PREDNISONE 5 MG/1
5 TABLET ORAL DAILY
Status: DISCONTINUED | OUTPATIENT
Start: 2018-12-12 | End: 2018-12-17 | Stop reason: HOSPADM

## 2018-12-11 RX ORDER — AMLODIPINE BESYLATE 10 MG/1
10 TABLET ORAL DAILY
Status: DISCONTINUED | OUTPATIENT
Start: 2018-12-12 | End: 2018-12-17 | Stop reason: HOSPADM

## 2018-12-11 RX ORDER — HEPARIN SODIUM 5000 [USP'U]/.5ML
5000 INJECTION, SOLUTION INTRAVENOUS; SUBCUTANEOUS EVERY 12 HOURS
Status: DISCONTINUED | OUTPATIENT
Start: 2018-12-12 | End: 2018-12-17 | Stop reason: HOSPADM

## 2018-12-11 RX ORDER — HYDRALAZINE HYDROCHLORIDE 100 MG/1
100 TABLET, FILM COATED ORAL EVERY 8 HOURS
Status: DISCONTINUED | OUTPATIENT
Start: 2018-12-11 | End: 2018-12-17 | Stop reason: HOSPADM

## 2018-12-11 RX ORDER — CHLORHEXIDINE GLUCONATE ORAL RINSE 1.2 MG/ML
15 SOLUTION DENTAL 2 TIMES DAILY
Status: DISCONTINUED | OUTPATIENT
Start: 2018-12-11 | End: 2018-12-17 | Stop reason: HOSPADM

## 2018-12-11 RX ORDER — FENTANYL CITRATE 50 UG/ML
100 INJECTION, SOLUTION INTRAMUSCULAR; INTRAVENOUS ONCE
Status: COMPLETED | OUTPATIENT
Start: 2018-12-11 | End: 2018-12-11

## 2018-12-11 RX ORDER — LISINOPRIL 5 MG/1
5 TABLET ORAL DAILY
Status: DISCONTINUED | OUTPATIENT
Start: 2018-12-12 | End: 2018-12-17 | Stop reason: HOSPADM

## 2018-12-11 RX ORDER — KETAMINE HYDROCHLORIDE 10 MG/ML
INJECTION, SOLUTION INTRAMUSCULAR; INTRAVENOUS PRN
Status: DISCONTINUED | OUTPATIENT
Start: 2018-12-11 | End: 2018-12-11

## 2018-12-11 RX ORDER — MEPERIDINE HYDROCHLORIDE 50 MG/ML
12.5 INJECTION INTRAMUSCULAR; INTRAVENOUS; SUBCUTANEOUS
Status: DISCONTINUED | OUTPATIENT
Start: 2018-12-11 | End: 2018-12-11 | Stop reason: HOSPADM

## 2018-12-11 RX ORDER — BETA-CAROTENE 7500 MCG
25000 CAPSULE ORAL 2 TIMES DAILY
Status: DISCONTINUED | OUTPATIENT
Start: 2018-12-11 | End: 2018-12-17 | Stop reason: HOSPADM

## 2018-12-11 RX ORDER — LIDOCAINE 40 MG/G
CREAM TOPICAL
Status: DISCONTINUED | OUTPATIENT
Start: 2018-12-11 | End: 2018-12-11 | Stop reason: HOSPADM

## 2018-12-11 RX ORDER — FENTANYL CITRATE 50 UG/ML
INJECTION, SOLUTION INTRAMUSCULAR; INTRAVENOUS PRN
Status: DISCONTINUED | OUTPATIENT
Start: 2018-12-11 | End: 2018-12-11

## 2018-12-11 RX ORDER — BUPIVACAINE HYDROCHLORIDE 2.5 MG/ML
INJECTION, SOLUTION EPIDURAL; INFILTRATION; INTRACAUDAL PRN
Status: DISCONTINUED | OUTPATIENT
Start: 2018-12-11 | End: 2018-12-11

## 2018-12-11 RX ORDER — CLONIDINE HYDROCHLORIDE 0.1 MG/1
0.1 TABLET ORAL AT BEDTIME
Status: DISCONTINUED | OUTPATIENT
Start: 2018-12-12 | End: 2018-12-17 | Stop reason: HOSPADM

## 2018-12-11 RX ORDER — CIPROFLOXACIN 2 MG/ML
400 INJECTION, SOLUTION INTRAVENOUS
Status: DISCONTINUED | OUTPATIENT
Start: 2018-12-11 | End: 2018-12-11 | Stop reason: HOSPADM

## 2018-12-11 RX ORDER — CIPROFLOXACIN 2 MG/ML
400 INJECTION, SOLUTION INTRAVENOUS SEE ADMIN INSTRUCTIONS
Status: DISCONTINUED | OUTPATIENT
Start: 2018-12-11 | End: 2018-12-11 | Stop reason: HOSPADM

## 2018-12-11 RX ORDER — ONDANSETRON 2 MG/ML
4 INJECTION INTRAMUSCULAR; INTRAVENOUS EVERY 30 MIN PRN
Status: DISCONTINUED | OUTPATIENT
Start: 2018-12-11 | End: 2018-12-11 | Stop reason: HOSPADM

## 2018-12-11 RX ORDER — SODIUM CHLORIDE, SODIUM LACTATE, POTASSIUM CHLORIDE, CALCIUM CHLORIDE 600; 310; 30; 20 MG/100ML; MG/100ML; MG/100ML; MG/100ML
INJECTION, SOLUTION INTRAVENOUS CONTINUOUS
Status: DISCONTINUED | OUTPATIENT
Start: 2018-12-11 | End: 2018-12-14

## 2018-12-11 RX ORDER — LIDOCAINE HYDROCHLORIDE 20 MG/ML
INJECTION, SOLUTION INFILTRATION; PERINEURAL PRN
Status: DISCONTINUED | OUTPATIENT
Start: 2018-12-11 | End: 2018-12-11

## 2018-12-11 RX ORDER — DEXTROSE MONOHYDRATE 25 G/50ML
25-50 INJECTION, SOLUTION INTRAVENOUS
Status: DISCONTINUED | OUTPATIENT
Start: 2018-12-11 | End: 2018-12-17 | Stop reason: HOSPADM

## 2018-12-11 RX ORDER — PROPOFOL 10 MG/ML
INJECTION, EMULSION INTRAVENOUS PRN
Status: DISCONTINUED | OUTPATIENT
Start: 2018-12-11 | End: 2018-12-11

## 2018-12-11 RX ADMIN — KETAMINE HCL-NACL SOLN PREF SY 50 MG/5ML-0.9% (10MG/ML) 10 MG: 10 SOLUTION PREFILLED SYRINGE at 09:18

## 2018-12-11 RX ADMIN — KETAMINE HCL-NACL SOLN PREF SY 50 MG/5ML-0.9% (10MG/ML) 10 MG: 10 SOLUTION PREFILLED SYRINGE at 10:09

## 2018-12-11 RX ADMIN — PHENYLEPHRINE HYDROCHLORIDE 100 MCG: 10 INJECTION, SOLUTION INTRAMUSCULAR; INTRAVENOUS; SUBCUTANEOUS at 11:06

## 2018-12-11 RX ADMIN — HYDRALAZINE HYDROCHLORIDE 100 MG: 100 TABLET ORAL at 16:28

## 2018-12-11 RX ADMIN — HYDROCORTISONE SODIUM SUCCINATE 100 MG: 100 INJECTION, POWDER, FOR SOLUTION INTRAMUSCULAR; INTRAVENOUS at 08:20

## 2018-12-11 RX ADMIN — BUPIVACAINE HYDROCHLORIDE 20 ML: 2.5 INJECTION, SOLUTION EPIDURAL; INFILTRATION; INTRACAUDAL; PERINEURAL at 07:50

## 2018-12-11 RX ADMIN — TACROLIMUS 2 MG: 1 CAPSULE ORAL at 18:46

## 2018-12-11 RX ADMIN — CISATRACURIUM BESYLATE 4 MG: 2 INJECTION INTRAVENOUS at 09:17

## 2018-12-11 RX ADMIN — METRONIDAZOLE 500 MG: 500 INJECTION, SOLUTION INTRAVENOUS at 21:37

## 2018-12-11 RX ADMIN — FENTANYL CITRATE 50 MCG: 50 INJECTION, SOLUTION INTRAMUSCULAR; INTRAVENOUS at 10:22

## 2018-12-11 RX ADMIN — CISATRACURIUM BESYLATE 16 MG: 2 INJECTION INTRAVENOUS at 08:23

## 2018-12-11 RX ADMIN — BUPIVACAINE 20 ML: 13.3 INJECTION, SUSPENSION, LIPOSOMAL INFILTRATION at 07:50

## 2018-12-11 RX ADMIN — FENTANYL CITRATE 50 MCG: 50 INJECTION INTRAMUSCULAR; INTRAVENOUS at 07:51

## 2018-12-11 RX ADMIN — Medication: at 12:44

## 2018-12-11 RX ADMIN — ALBUMIN HUMAN: 0.05 INJECTION, SOLUTION INTRAVENOUS at 09:12

## 2018-12-11 RX ADMIN — HYDROMORPHONE HYDROCHLORIDE 0.5 MG: 1 INJECTION, SOLUTION INTRAMUSCULAR; INTRAVENOUS; SUBCUTANEOUS at 10:28

## 2018-12-11 RX ADMIN — MIDAZOLAM HYDROCHLORIDE 1 MG: 2 INJECTION, SOLUTION INTRAMUSCULAR; INTRAVENOUS at 07:51

## 2018-12-11 RX ADMIN — GLYCOPYRROLATE 0.2 MG: 0.2 INJECTION, SOLUTION INTRAMUSCULAR; INTRAVENOUS at 09:02

## 2018-12-11 RX ADMIN — CIPROFLOXACIN 400 MG: 2 INJECTION, SOLUTION INTRAVENOUS at 18:48

## 2018-12-11 RX ADMIN — ACETAMINOPHEN 975 MG: 325 TABLET, FILM COATED ORAL at 07:06

## 2018-12-11 RX ADMIN — SODIUM CHLORIDE, POTASSIUM CHLORIDE, SODIUM LACTATE AND CALCIUM CHLORIDE: 600; 310; 30; 20 INJECTION, SOLUTION INTRAVENOUS at 12:53

## 2018-12-11 RX ADMIN — POTASSIUM CHLORIDE 10 MEQ: 7.46 INJECTION, SOLUTION INTRAVENOUS at 10:09

## 2018-12-11 RX ADMIN — PHENYLEPHRINE HYDROCHLORIDE 100 MCG: 10 INJECTION, SOLUTION INTRAMUSCULAR; INTRAVENOUS; SUBCUTANEOUS at 11:22

## 2018-12-11 RX ADMIN — PHENYLEPHRINE HYDROCHLORIDE 100 MCG: 10 INJECTION, SOLUTION INTRAMUSCULAR; INTRAVENOUS; SUBCUTANEOUS at 09:25

## 2018-12-11 RX ADMIN — FENTANYL CITRATE 150 MCG: 50 INJECTION, SOLUTION INTRAMUSCULAR; INTRAVENOUS at 08:57

## 2018-12-11 RX ADMIN — ENOXAPARIN SODIUM 40 MG: 40 INJECTION SUBCUTANEOUS at 07:54

## 2018-12-11 RX ADMIN — PROCHLORPERAZINE EDISYLATE 10 MG: 5 INJECTION INTRAMUSCULAR; INTRAVENOUS at 19:09

## 2018-12-11 RX ADMIN — FENTANYL CITRATE 50 MCG: 50 INJECTION, SOLUTION INTRAMUSCULAR; INTRAVENOUS at 08:23

## 2018-12-11 RX ADMIN — ACETAMINOPHEN 1000 MG: 500 TABLET, FILM COATED ORAL at 16:27

## 2018-12-11 RX ADMIN — PROPOFOL 50 MG: 10 INJECTION, EMULSION INTRAVENOUS at 08:23

## 2018-12-11 RX ADMIN — SODIUM CHLORIDE: 9 INJECTION, SOLUTION INTRAVENOUS at 08:15

## 2018-12-11 RX ADMIN — KETAMINE HCL-NACL SOLN PREF SY 50 MG/5ML-0.9% (10MG/ML) 30 MG: 10 SOLUTION PREFILLED SYRINGE at 08:23

## 2018-12-11 RX ADMIN — LIDOCAINE HYDROCHLORIDE 100 MG: 20 INJECTION, SOLUTION INFILTRATION; PERINEURAL at 08:23

## 2018-12-11 RX ADMIN — FENTANYL CITRATE 50 MCG: 50 INJECTION, SOLUTION INTRAMUSCULAR; INTRAVENOUS at 09:44

## 2018-12-11 RX ADMIN — PANTOPRAZOLE SODIUM 40 MG: 40 INJECTION, POWDER, FOR SOLUTION INTRAVENOUS at 16:28

## 2018-12-11 RX ADMIN — METRONIDAZOLE 500 MG: 500 INJECTION, SOLUTION INTRAVENOUS at 08:39

## 2018-12-11 RX ADMIN — CIPROFLOXACIN 400 MG: 2 INJECTION INTRAVENOUS at 07:00

## 2018-12-11 RX ADMIN — MYCOPHENOLATE MOFETIL 1000 MG: 250 CAPSULE ORAL at 23:11

## 2018-12-11 RX ADMIN — SODIUM CHLORIDE: 9 INJECTION, SOLUTION INTRAVENOUS at 08:49

## 2018-12-11 RX ADMIN — HYDRALAZINE HYDROCHLORIDE 100 MG: 100 TABLET ORAL at 23:12

## 2018-12-11 RX ADMIN — GRANISETRON HYDROCHLORIDE 1 MG: 1 INJECTION INTRAVENOUS at 07:42

## 2018-12-11 ASSESSMENT — ACTIVITIES OF DAILY LIVING (ADL)
ADLS_ACUITY_SCORE: 13
ADLS_ACUITY_SCORE: 13

## 2018-12-11 ASSESSMENT — MIFFLIN-ST. JEOR: SCORE: 1522.38

## 2018-12-11 NOTE — BRIEF OP NOTE
[facility] [place]    Brief Operative Note    Pre-operative diagnosis: Attention to colostomy   Post-operative diagnosis Same  Procedure: Procedure(s):  Laparoscopic Assisted Colostomy Takedown  Laparoscopic lysis of adhesions  Diverting loop ileostomy  Flexible Sigmoidoscopy  Surgeon: Surgeon(s) and Role:     * Rick Tran MD - Primary     * Christopher Sood MD - Fellow - Assisting  Anesthesia: Combined General with Block   Estimated blood loss: 50  Fluids: 800  UOP: 5  Drains: None  Specimens:   ID Type Source Tests Collected by Time Destination   A : colostomy Tissue Colon SURGICAL PATHOLOGY EXAM Rick Tran MD 12/11/2018  9:23 AM      Findings:   Flimsy interloop adhesions, rectal stump intact, leak test negative.  Complications: None.  Implants: None.

## 2018-12-11 NOTE — LETTER
Transition Communication Hand-off for Care Transitions to Next Level of Care Provider    Name: Murray Nicholson  : 1955  MRN #: [id number]  Primary Care Provider: Yahir Turcios     Primary Clinic: [address]  [facility] [address]     Reason for Hospitalization:  Diverticulitis Of Colon   Colostomy status (H)  Admit Date/Time: 2018  5:52 AM  Discharge Date: 18  Payor Source: Payor: MEDICARE / Plan: MEDICARE / Product Type: Medicare  Readmission Assessment Measure (MERCED) Risk Score/category: elevated.   Reason for Communication Hand-off Referral: Multiple providers/specialties  Discharge Plan:  Discharge Needs Assessment:  Needs      Most Recent Value   Equipment Currently Used at Home  none   Home Care  [facility] [phone], Fax: [phone]   Outpatient Dialysis  -- [[facility] (Ph: [phone])]      Follow-up specialty is recommended: Yes    Follow-up plan:    Future Appointments   Date Time Provider Department Center          2018  7:30 AM Tristan Bañuelos MD [facility]   2018  9:30 AM Eduardo Lea, [facility]   2019  8:00 AM  LAB [facility]   2019  8:30 AM Cleo Marks NP [facility]   2019  9:30 AM U2A ROOM 7 [facility] O   3/29/2019  8:00 AM  LAB [facility]   3/29/2019  8:30 AM Cleo Marks NP [facility]   3/29/2019  9:30 AM U2A ROOM [address] [place] [address] O   6/10/2019  3:00 PM Klever Ernst MD [facility]       Any outstanding tests or procedures:        Referrals     Future Labs/Procedures    Home care nursing referral     Comments:    [facility]   Phone: [phone]     For RN evaluation post hospitalization.   Assess vital signs, respiratory and cardiac status, activity tolerance, hydration, nutritional status, med setup and management.   [facility] nurse consult for continued ostomy education and supplies.     Your provider has ordered home care nursing services. If you  have not been contacted within 2 days of your discharge please call the inpatient department phone number at [phone] .    Medication Therapy Management Referral     Comments:    MTM referral reason            Patient had a hospital or ED visit in last 6 months and has more than 10   PTA or Discharge medications    Patient has 5 PTA or Discharge Medications AND one of the following   diagnoses: DM,HF,COPD,AMI DX,PULM HTN       This service is designed to help you get the most from your medications.  A specially trained pharmacist will work closely with you and your doctors  to solve any problems related to your medications and to help you get the   best results from taking them.      The Medication Therapy Management staff will call you to schedule an appointment.            Key Recommendations:  Post hospitalization follow up.     JONATHAN BULLARD RN CC

## 2018-12-11 NOTE — OR NURSING
Pt is vitally stable,alert alert and oriented x 3. Lab results seen by Dr. Valdez and Dr. Torres saw pt at bedside,sign out completed. Denies nausea,pain is controlled with Dilaudid PCA. Pt met 6 C discharge criteria.

## 2018-12-11 NOTE — ANESTHESIA CARE TRANSFER NOTE
Patient: Murray Nicholson    Procedure(s):  Laparoscopic Assisted Colostomy Takedown, Laparoscopic Lysis of Adhesions  Flexible Sigmoidoscopy  Possible Laparoscopic Ileostomy    Diagnosis: Diverticulitis Of Colon   Diagnosis Additional Information: No value filed.    Anesthesia Type:   General, ETT     Note:  Airway :Face Mask  Patient transferred to:PACU  Comments: VSS, report To RN.  BG pending in pacu  Handoff Report: Identifed the Patient, Identified the Reponsible Provider, Reviewed the pertinent medical history, Discussed the surgical course, Reviewed Intra-OP anesthesia mangement and issues during anesthesia, Set expectations for post-procedure period and Allowed opportunity for questions and acknowledgement of understanding      Vitals: (Last set prior to Anesthesia Care Transfer)    CRNA VITALS  12/11/2018 1114 - 12/11/2018 1152      12/11/2018             Pulse:  88    Ht Rate:  88    SpO2:  100 %    Resp Rate (observed):  6  (Abnormal)                 Electronically Signed By: VERONICA Springer CRNA  December 11, 2018  11:52 AM

## 2018-12-11 NOTE — ANESTHESIA POSTPROCEDURE EVALUATION
Anesthesia POST Procedure Evaluation    Patient: Murray Nicholson   MRN:     [id number] Gender:   male   Age:    63 year old :      1955        Preoperative Diagnosis: Diverticulitis Of Colon    Procedure(s):  Laparoscopic Assisted Colostomy Takedown, Laparoscopic Lysis of Adhesions  Flexible Sigmoidoscopy  Possible Laparoscopic Ileostomy   Postop Comments: No value filed.       Anesthesia Type:  Not documented    Reportable Event: NO     PAIN: Uncomplicated   Sign Out status: Comfortable, Well controlled pain     PONV: No PONV   Sign Out status:  No Nausea or Vomiting     Neuro/Psych: Uneventful perioperative course   Sign Out Status: Preoperative baseline; Age appropriate mentation     Airway/Resp.: Uneventful perioperative course   Sign Out Status: Non labored breathing, age appropriate RR; Resp. Status within EXPECTED Parameters     CV: Uneventful perioperative course   Sign Out status: Appropriate BP and perfusion indices; Appropriate HR/Rhythm     Disposition:   Sign Out in:  PACU  Disposition:  Phase II; Home  Recovery Course: Uneventful  Follow-Up: Not required           Last Anesthesia Record Vitals:  CRNA VITALS  2018 1114 - 2018 1214      2018             Pulse:  88    Ht Rate:  88    SpO2:  100 %    Resp Rate (observed):  6  (Abnormal)           Last PACU/Preop Vitals:  Vitals:    18 0755 18 1148 18 1200   BP: (!) 142/100 132/77 124/73   Pulse:      Resp:  12 12   Temp:      SpO2: 100% 100% 100%         Electronically Signed By: Lila Torres MD, 2018, 12:26 PM

## 2018-12-11 NOTE — ANESTHESIA PROCEDURE NOTES
Arterial Line Procedure Note  Staff:     Anesthesiologist:  Lila Torres MD  Location: In OR After Induction  Procedure Start/Stop Times:     patient identified, IV checked, site marked, risks and benefits discussed, informed consent, monitors and equipment checked, pre-op evaluation and at physician/surgeon's request      Correct Patient: Yes      Correct Position: Yes      Correct Site: Yes      Correct Procedure: Yes      Correct Laterality:  Yes    Site Marked:  Yes  Line Placement:     Procedure:  Arterial Line    Insertion Site:  Radial    Insertion laterality:  Right    Skin Prep: Chloraprep      Patient Prep: mask, sterile gloves and hat      Local skin infiltration:  None    Ultrasound Guided?: No      Catheter size:  Other (See Comment)    Cath secured with: other (comment)      Dressing:  Tegaderm    Complications:  None obvious    Arterial waveform: Yes      IBP within 10% of NIBP: Yes    Assessment/Narrative:      20 g Jelco

## 2018-12-11 NOTE — OP NOTE
Procedure Date: 12/11/2018.      PREOPERATIVE DIAGNOSES:   1.  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   2.  Gastroesophageal reflux disease.   3.  End-stage renal disease (on hemodialysis).   4.  Dyslipidemia.   5.  Hypertension.   6.  Hypertensive cardiomyopathy (status post heart transplantation).   7.  Iron deficiency and chronic disease anemia.   8.  Diabetes mellitus type 2 (on insulin).   9.  History of Clostridium difficile colitis.   10.  Colostomy status.      POSTOPERATIVE DIAGNOSES:   1.  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   2.  Gastroesophageal reflux disease.   3.  End-stage renal disease (on hemodialysis).   4.  Dyslipidemia.   5.  Hypertension.   6.  Hypertensive cardiomyopathy (status post heart transplantation).   7.  Iron deficiency and chronic disease anemia.   8.  Diabetes mellitus type 2 (on insulin).   9.  History of Clostridium difficile colitis.   10.  Colostomy status.      ANESTHESIA:  General endotracheal anesthesia plus bilateral TAP blocks.      PROCEDURES PERFORMED:   1.  Laparoscopic colostomy takedown.   2.  Laparoscopic extensive lysis of adhesions (60 minutes).   3.  Flexible sigmoidoscopy.   4.  Diverting loop ileostomy.      SURGEON:  Rick Tran MD      ASSISTANT:  Christopher Sood MD (Colon and Rectal Surgery fellow)      BRIEF HISTORY:  Murray is a 63-year-old pleasant gentleman with complex past medical history who underwent a hand-assisted laparoscopic sigmoidectomy with end-colostomy in August 2018. The patient also had a small bowel fistula at that time and underwent a small bowel resection.  His postop recovery was quite protracted but he did not suffer any major complications.  He recovered well and  "his nutrition improved.  His most recent albumin was a 3.2 and his prealbumin was 27.  He underwent a preoperative consultations with the Nephrology and Cardiology teams.  I thoroughly discussed the risks, benefits and alternatives of operative treatment with Murray and he agreed to proceed.      DESCRIPTION OF PROCEDURE:  After obtaining informed consent, the patient was brought to the operating room, placed in the modified lithotomy position.  General endotracheal anesthesia was gently induced without difficulty.  The patient underwent preoperative placement of bilateral TAP blocks.  He also received appropriate preoperative antibiotic prophylaxis as well as mechanical and chemical DVT prophylaxis.  Bilateral lower extremity pneumatic compression devices were applied and all pressure points were cushioned.  A 10 Italian Dobbins catheter was placed.  The abdomen was prepped and draped in a standard sterile fashion.  A \"timeout\" was performed. A circular incision was placed around the left-sided colostomy and circumferential dissection was carried down all the way to the peritoneal cavity.  There were some adhesions to the parietal peritoneum around the colostomy site and these were taken down with a combination of sharp dissection and monopolar electrocautery.  After this, we excised the colostomy and sent it for permanent pathology.  A 2-0 Prolene pursestring suture was then placed and the anvil of a 29 mm EEA stapler was sutured in with the pursestring suture.  Care was taken to not incorporate any adjacent tissue or diverticula.  The anvil was then returned to the peritoneal cavity.  Additional adhesions to the lower midline were taken down with blunt dissection.  A GelPort mini was then placed at this incision and pneumoperitoneum was established up to 15 mmHg without difficulty.  A 12 mm supraumbilical incision was placed and a 12 mm balloon blunt-tipped trocar was then placed at this incision and 2 additional " right-sided 5 mm trocars were also placed under direct vision.  The peritoneal cavity was assessed with a 30-degree angle 10 mm laparoscope and there was no evidence of organ injury or bleeding. The patient was then positioned in order to retract the small bowel to the upper abdomen.  There were multiple interloop small bowel adhesions and these were taken down over the course of 60 minutes with sharp dissection.  There was adequate length of the descending colon to perform a colorectal anastomosis.  The Noni pouch was then identified as it was sutured to the left pelvic sidewall.  Both of the Prolene sutures were removed.  The Noni pouch was dissected free for approximately 5 cm distally.  The left ureter was clearly identified and care was taken to not injure the structure during the dissection.  A flexible sigmoidoscopy was performed at this time and we did notice a small enterotomy at the Noni's pouch with hydropneumatic testing; therefore, we re-stapled the Noni's pouch approximately 2.5 cm.  This was performed with an Endo-NINFA stapler (60 mm green load).  Flexible sigmoidoscopy was repeated after this and there was no evidence of any air leak from the Noni's pouch.  The previous staple line was extracted through 1 of the 12 mm trocars and sent and not sent for pathology. After this, a 29 mm EEA stapler was passed transanally all the way up to the proximal rectum.  The post was delivered at the anterior aspect of the proximal rectum in order to avoid intersection of staple lines.  The anvil was then brought down to the post and the post was retrieved.  There was absolutely no evidence of tension, torsion or inadequate blood supply to the colorectal anastomosis.  The EEA stapler was then fired and retrieved.  Inspection of the anastomotic rings showed 2 intact anastomotic rings.  Flexible sigmoidoscopy was repeated with hydropneumatic testing.  There was absolutely no evidence of any air  leakage from the colorectal anastomosis.  Upon endoscopic evaluation, there was no evidence of bleeding and bleeding at the circular staple line and the mucosa on either side of the staple line appeared to be viable and healthy. The colonoscope was then removed.  Hemostasis was corroborated.  Instrument, sponge and needle counts were all correct as reported to me.  The 12 mm trocar sites were closed at the fascial level with a PMI device using 0 Vicryl suture.  A segment of ileum approximately 15 cm proximal to the ileocecal valve was identified and oriented.  Pneumoperitoneum was desufflated and the segment of ileum was brought out through this circular incision where the previous colostomy was.  A sheet of Seprafilm was placed around this segment of ileum.  The wounds were irrigated with dilute peroxide.  All skin incisions were reapproximated with running 4-0 Monocryl subcuticular sutures and Dermabond Prineo.  The segment of ileum was matured as a loop ileostomy with multiple 3-0 Vicryl sutures.  A sterile stoma appliance was then placed.  The patient tolerated the procedure well.      COMPLICATIONS:  None immediately.      ESTIMATED BLOOD LOSS:  50 mL.      REPLACEMENT:  800 mL of crystalloid.      DRAINS/TUBES:  None.  Dobbins catheter was removed at the end of the case given that the patient has minimal urine output at baseline.      SPECIMENS:  Colostomy.      FINDINGS:  29 mm colorectal anastomosis at 12 cm from the anal verge.  Negative hydropneumatic testing.      DISPOSITION:  PACU.         ELEANOR HANNAH MD             D: 2018   T: 2018   MT: PK      Name:     SANCHEZ MCNEIL   MRN:      [id number]        Account:        [id number]   :      1955           Procedure Date: 2018      Document: [id number]       cc: Yahir Ernst MD       [facility] Benito Lawrence  MD

## 2018-12-11 NOTE — PROGRESS NOTES
RT did a consult for pulm toilet. Per pt he is breathing fine and not in need for any additional therapy. Pt also refused using any Bipap/CPAP. Will continue to follow.

## 2018-12-12 LAB
ANION GAP SERPL CALCULATED.3IONS-SCNC: 12 MMOL/L (ref 3–14)
BUN SERPL-MCNC: 23 MG/DL (ref 7–30)
CALCIUM SERPL-MCNC: 8.8 MG/DL (ref 8.5–10.1)
CHLORIDE SERPL-SCNC: 97 MMOL/L (ref 94–109)
CO2 SERPL-SCNC: 24 MMOL/L (ref 20–32)
CREAT SERPL-MCNC: 5.82 MG/DL (ref 0.66–1.25)
GFR SERPL CREATININE-BSD FRML MDRD: 10 ML/MIN/1.7M2
GLUCOSE BLDC GLUCOMTR-MCNC: 111 MG/DL (ref 70–99)
GLUCOSE BLDC GLUCOMTR-MCNC: 114 MG/DL (ref 70–99)
GLUCOSE BLDC GLUCOMTR-MCNC: 127 MG/DL (ref 70–99)
GLUCOSE SERPL-MCNC: 112 MG/DL (ref 70–99)
HGB BLD-MCNC: 9.9 G/DL (ref 13.3–17.7)
MAGNESIUM SERPL-MCNC: 1.3 MG/DL (ref 1.6–2.3)
MAGNESIUM SERPL-MCNC: 2.5 MG/DL (ref 1.6–2.3)
PHOSPHATE SERPL-MCNC: 3.4 MG/DL (ref 2.5–4.5)
POTASSIUM SERPL-SCNC: 3.8 MMOL/L (ref 3.4–5.3)
SODIUM SERPL-SCNC: 133 MMOL/L (ref 133–144)
TACROLIMUS BLD-MCNC: 11.1 UG/L (ref 5–15)
TME LAST DOSE: NORMAL H

## 2018-12-12 PROCEDURE — 36415 COLL VENOUS BLD VENIPUNCTURE: CPT | Performed by: COLON & RECTAL SURGERY

## 2018-12-12 PROCEDURE — C9113 INJ PANTOPRAZOLE SODIUM, VIA: HCPCS | Performed by: COLON & RECTAL SURGERY

## 2018-12-12 PROCEDURE — 25000132 ZZH RX MED GY IP 250 OP 250 PS 637: Mod: GY | Performed by: COLON & RECTAL SURGERY

## 2018-12-12 PROCEDURE — 83735 ASSAY OF MAGNESIUM: CPT | Performed by: COLON & RECTAL SURGERY

## 2018-12-12 PROCEDURE — 80180 DRUG SCRN QUAN MYCOPHENOLATE: CPT | Performed by: COLON & RECTAL SURGERY

## 2018-12-12 PROCEDURE — 25000131 ZZH RX MED GY IP 250 OP 636 PS 637: Mod: GY | Performed by: INTERNAL MEDICINE

## 2018-12-12 PROCEDURE — 85018 HEMOGLOBIN: CPT | Performed by: COLON & RECTAL SURGERY

## 2018-12-12 PROCEDURE — 80197 ASSAY OF TACROLIMUS: CPT | Performed by: COLON & RECTAL SURGERY

## 2018-12-12 PROCEDURE — 84100 ASSAY OF PHOSPHORUS: CPT | Performed by: COLON & RECTAL SURGERY

## 2018-12-12 PROCEDURE — 25000128 H RX IP 250 OP 636: Performed by: COLON & RECTAL SURGERY

## 2018-12-12 PROCEDURE — 40000901 ZZH STATISTIC WOC PT EDUCATION, 0-15 MIN

## 2018-12-12 PROCEDURE — 00000146 ZZHCL STATISTIC GLUCOSE BY METER IP

## 2018-12-12 PROCEDURE — 21400006 ZZH R&B CCU INTERMEDIATE UMMC

## 2018-12-12 PROCEDURE — 25000131 ZZH RX MED GY IP 250 OP 636 PS 637: Mod: GY | Performed by: COLON & RECTAL SURGERY

## 2018-12-12 PROCEDURE — 25000125 ZZHC RX 250: Performed by: COLON & RECTAL SURGERY

## 2018-12-12 PROCEDURE — 80048 BASIC METABOLIC PNL TOTAL CA: CPT | Performed by: COLON & RECTAL SURGERY

## 2018-12-12 PROCEDURE — 99231 SBSQ HOSP IP/OBS SF/LOW 25: CPT | Performed by: NURSE PRACTITIONER

## 2018-12-12 PROCEDURE — A9270 NON-COVERED ITEM OR SERVICE: HCPCS | Mod: GY | Performed by: COLON & RECTAL SURGERY

## 2018-12-12 PROCEDURE — 25000132 ZZH RX MED GY IP 250 OP 250 PS 637: Mod: GY | Performed by: PHYSICIAN ASSISTANT

## 2018-12-12 PROCEDURE — 99232 SBSQ HOSP IP/OBS MODERATE 35: CPT | Mod: GC | Performed by: INTERNAL MEDICINE

## 2018-12-12 RX ADMIN — MYCOPHENOLATE MOFETIL 1000 MG: 250 CAPSULE ORAL at 20:37

## 2018-12-12 RX ADMIN — CHLORASEPTIC 1 ML: 1.5 LIQUID ORAL at 20:41

## 2018-12-12 RX ADMIN — FLUTICASONE PROPIONATE 2 SPRAY: 50 SPRAY, METERED NASAL at 08:05

## 2018-12-12 RX ADMIN — Medication: at 04:09

## 2018-12-12 RX ADMIN — SODIUM CHLORIDE, POTASSIUM CHLORIDE, SODIUM LACTATE AND CALCIUM CHLORIDE: 600; 310; 30; 20 INJECTION, SOLUTION INTRAVENOUS at 12:37

## 2018-12-12 RX ADMIN — ACETAMINOPHEN 1000 MG: 500 TABLET, FILM COATED ORAL at 16:08

## 2018-12-12 RX ADMIN — Medication 25000 UNITS: at 08:04

## 2018-12-12 RX ADMIN — PANTOPRAZOLE SODIUM 40 MG: 40 INJECTION, POWDER, FOR SOLUTION INTRAVENOUS at 16:28

## 2018-12-12 RX ADMIN — HYDRALAZINE HYDROCHLORIDE 100 MG: 100 TABLET ORAL at 08:04

## 2018-12-12 RX ADMIN — CLOTRIMAZOLE 1 TROCHE: 10 LOZENGE ORAL at 08:04

## 2018-12-12 RX ADMIN — Medication 5000 UNITS: at 12:31

## 2018-12-12 RX ADMIN — CLONIDINE HYDROCHLORIDE 0.1 MG: 0.1 TABLET ORAL at 22:07

## 2018-12-12 RX ADMIN — ACETAMINOPHEN 1000 MG: 500 TABLET, FILM COATED ORAL at 12:42

## 2018-12-12 RX ADMIN — AMLODIPINE BESYLATE 10 MG: 10 TABLET ORAL at 08:04

## 2018-12-12 RX ADMIN — HYDRALAZINE HYDROCHLORIDE 100 MG: 100 TABLET ORAL at 16:08

## 2018-12-12 RX ADMIN — LISINOPRIL 5 MG: 5 TABLET ORAL at 08:03

## 2018-12-12 RX ADMIN — ACETAMINOPHEN 1000 MG: 500 TABLET, FILM COATED ORAL at 08:04

## 2018-12-12 RX ADMIN — MYCOPHENOLATE MOFETIL 1000 MG: 250 CAPSULE ORAL at 08:06

## 2018-12-12 RX ADMIN — Medication 25000 UNITS: at 20:37

## 2018-12-12 RX ADMIN — TACROLIMUS 1.5 MG: 0.5 CAPSULE ORAL at 18:14

## 2018-12-12 RX ADMIN — ACETAMINOPHEN 1000 MG: 500 TABLET, FILM COATED ORAL at 20:37

## 2018-12-12 RX ADMIN — Medication 5000 UNITS: at 23:12

## 2018-12-12 RX ADMIN — CHLORHEXIDINE GLUCONATE 0.12% ORAL RINSE 15 ML: 1.2 LIQUID ORAL at 08:04

## 2018-12-12 RX ADMIN — MAGNESIUM SULFATE IN WATER 4 G: 40 INJECTION, SOLUTION INTRAVENOUS at 08:15

## 2018-12-12 RX ADMIN — TACROLIMUS 2 MG: 1 CAPSULE ORAL at 08:07

## 2018-12-12 RX ADMIN — METRONIDAZOLE 500 MG: 500 INJECTION, SOLUTION INTRAVENOUS at 01:35

## 2018-12-12 RX ADMIN — Medication 2 MG: at 01:49

## 2018-12-12 RX ADMIN — PREDNISONE 5 MG: 5 TABLET ORAL at 08:04

## 2018-12-12 RX ADMIN — HYDRALAZINE HYDROCHLORIDE 100 MG: 100 TABLET ORAL at 23:12

## 2018-12-12 RX ADMIN — CHLORHEXIDINE GLUCONATE 0.12% ORAL RINSE 15 ML: 1.2 LIQUID ORAL at 20:45

## 2018-12-12 RX ADMIN — CLOTRIMAZOLE 1 TROCHE: 10 LOZENGE ORAL at 20:37

## 2018-12-12 ASSESSMENT — PAIN DESCRIPTION - DESCRIPTORS
DESCRIPTORS: SHARP
DESCRIPTORS: SHARP

## 2018-12-12 ASSESSMENT — ACTIVITIES OF DAILY LIVING (ADL)
ADLS_ACUITY_SCORE: 13
ADLS_ACUITY_SCORE: 13
ADLS_ACUITY_SCORE: 15
ADLS_ACUITY_SCORE: 15
ADLS_ACUITY_SCORE: 13
ADLS_ACUITY_SCORE: 13

## 2018-12-12 NOTE — PROGRESS NOTES
Colorectal Surgery Progress Note  POD#1      Subjective: No acute events. Had emesis after surgery, not having nausea now. He reports some burping. Feels like he has to pee. Afebrile, HDS.     Vitals:  Vitals:    12/12/18 0136 12/12/18 0300 12/12/18 0400 12/12/18 0530   BP: (!) 157/99 (!) 159/95 (!) 153/92    BP Location: Left arm Left arm Left arm    Pulse:       Resp:  16 19    Temp: 97.9  F (36.6  C)      TempSrc: Oral      SpO2: 100% 100% 100% 100%   Weight:       Height:         I/O:  I/O last 3 completed shifts:  In: 3088.34 [P.O.:300; I.V.:2538.34]  Out: 190 [Urine:5; Emesis/NG output:60; Stool:75; Blood:50]    Physical Exam:  Gen: AAOx3, NAD  Pulm: Non-labored breathing on 1L NC  Abd: Soft, non-distended, appropriately tender, incisions x3 c/d/i with dermabond, no surrounding eythema   Ileostomy pink, warm and viable, moderately edematous w/ bowel sweat in bag, no gas or stool  Ext:  Warm and well-perfused    BMP  Recent Labs   Lab 12/12/18  0612 12/11/18  1153 12/11/18  0950 12/11/18  0802 12/07/18  0827    136 135  --  139   POTASSIUM 3.8 3.7 3.2* 3.5 3.9   CHLORIDE 97 102  --   --  103   CO2 24 23  --   --  25   BUN 23 16  --   --  21   CR 5.82* 4.43*  --   --  4.27*   * 117* 83  --  155*   MAG 1.3* 1.2*  --   --   --    PHOS 3.4  --   --   --   --      CBC  Recent Labs   Lab 12/12/18  0612 12/11/18  1153 12/11/18  0950 12/07/18  0827   WBC  --   --   --  4.0   HGB 9.9* 9.2* 8.8* 9.9*   HCT  --   --   --  31.7*   PLT  --   --   --  201         ASSESSMENT: 64 YO M with w/ hx heart transplant and perforated diverticulitis who underwent a hand-assisted laparoscopic sigmoidectomy with end-colostomy and small bowel resection for fistula in August 2018, now POD1 s/p lap colostomy, PATRICIA, flex sig and DLI (12/11). He is recovering appropriately, awaiting ROBF.    -Neuro: tylenol krissy, dilaudid PCA, melatonin  -CV:           -Heart failure service consulted for hx of heart transplant      immunosuppression with predisone, tacrolimus, mycophenolate    -Continue amlodipine, clonidine, hydralazine, lisinopril  -Resp: Flonase  -FEN/GI: FLD, IVF  -Endo: Insulin  -Renal: On HD, prefers to stick T/T/S schedule, defer to renal  -ID: Periop cipro/flagyl  -Ppx: HSQ TID, PPI, bactrim      Patient seen with fellow, Dr. Sood, who discussed with staff.    Nidhi Rahman, PGY1  Colon and Rectal Surgery

## 2018-12-12 NOTE — PROGRESS NOTES
Post Transplant Patient Social Work Assessment     Patient Name: Murray Nicholson  : 1955  Age: 63 year old  MRN: [id number]  Date of transplant: 18    Patient known to me from follow up in the transplant program.  Admitted on 18 for colostomy takedown.  Seen today to update assessment.      Presenting Information   Living Situation: Murray lives in an apartment alone  Functional Status: Murray reports that he's been able to be independent with all of his cares since last admission.   Cultural/Language/Spiritual Considerations: none noted    Support System  Primary Support Person sons  Other support:  friend  Plan for support in immediate post-hospital period: Murray is hoping that he can return home independently.     Health Care Directive  Decision Maker: self  Alternate Decision Maker: Jasper Ayala  Health Care Directive: Copy in Chart    Mental Health/Coping:   History of Mental Health: none   History of Chemical Health: none  Current status: stable  Coping: Murray reports that he's starting to feel frustrated with the continued ups and downs of his health. He reports that he's coping by staying busy reorganizing his home.   Services Needed/Recommended: Murray doesn't feel like he needs any additional support at this time.     Financial   Income: SSDI  Impact of transplant on income: Murray had been working full time before hospitalization for transplant.   Insurance and medication coverage: Medicare & supplement  Financial concerns: none noted  Resources needed: none at this time.     Discharge Plan   Patient and family discharge goal: Murray feels that he'll be able to return home   Barriers to discharge: medical stability     Education provided by SW: Social Work role inpatient setting, availability of support groups, parking information    Assessment and recommendations and plan:    Murray reports he was doing well at home before this hospitalization. He's hoping to return home without additional  assistance.    SW will continue to be available as needed.    Pager [phone]

## 2018-12-12 NOTE — PROGRESS NOTES
WOC Nurse Inpatient [facility]   WO Nurse Inpatient Adult     Initial Assessment   Assessment of new loop Ileostomy Stoma complication(s) none   Mucocutaneous junction; TRUONG, did not remove pouch today   Peristomal complication(s) TRUONG, did not remove pouch today   Pouch wear time:24-48 hours  Following today's visit:Patient / Patient  is  able to demonstrate; POD #1 patient in significant pain upon assessment, did not complete any hands on teaching today.       1. How to empty their pouch? no      2. How to change their pouch?  no      3. How to read and record intake and output correctly? no    Objective data:  Patient history according to medical record: 62 YO M with w/ hx heart transplant and perforated diverticulitis who underwent a hand-assisted laparoscopic sigmoidectomy with end-colostomy and small bowel resection for fistula in August 2018, now POD1 s/p lap colostomy, PATRICIA, flex sig and DLI (12/11). He is recovering appropriately, awaiting ROBF.    Current Diet/Nutrition: Orders Placed This Encounter      Full Liquid Diet     TPN no   I/O last 3 completed shifts:  In: 3088.34 [P.O.:300; I.V.:2538.34]  Out: 190 [Urine:5; Emesis/NG output:60; Stool:75; Blood:50]  Labs:    Recent Labs   Lab 12/12/18  0612  12/07/18  0827   ALBUMIN  --   --  2.9*   HGB 9.9*   < > 9.9*   WBC  --   --  4.0   CRP  --   --  12.0*    < > = values in this interval not displayed.        Physical Exam:  Current pouching system:Cook Sta   Reason for pouch change today: pouch not changed today  Stoma appearance: viable, healthy, pink-red, moist, edematous and protruberant  Stoma size; Not measured today, did not remove pouch.   Peristomal skin: not visualized (barrier in place)  Stoma output :clear and pink bowel sweat   Abdominal  Assessment  firm , NG still in place? No  Surgical Site: open to air  Pain: Sharp and Stabbing  Is patient still on a PCA Yes    Interventions:  Patient's chart evaluated.  Focus of today's visit:  verbal instruction  and diet and hydration    Participant of teaching session today patient   Orders: Reviewed  Change made with ostomy management today: No  Patient/family: lethargic  Supplies:at bedside    Plan:  Learning needs: initial fitting, refitting of appliance, pouch change demonstration , pouch change return demonstration, pouch emptying, output measurement, lifestyle adjustments and discharge instructions  Preparation for discharge: No discharge preparations started  Recommend home care? yes and by home WOC nurse if possible    Discussed plan of care with Patient  Nursing to notify the Provider(s) and re-consult the WOC Nurse if new ostomy concerns or discharge planned before next planned WOC visit.    WOC Nurse will return: Thursday  Face to face time: 15 minutes    Eliana Clay RN, BSN, CWON

## 2018-12-12 NOTE — PLAN OF CARE
6C PT: PT orders acknowledged and appreciated. Pt strongly declining all therapies at this date 2/2 pain, refusing check back later this PM. Will reschedule PT eval to tomorrow 12/13.

## 2018-12-12 NOTE — PLAN OF CARE
OT/6C: Cancel--Pt strongly declining therapy today due to pain. Pt requesting to reschedule eval for tomorrow. Will reschedule forward.

## 2018-12-12 NOTE — PLAN OF CARE
Afebrile.  's/90's. (order to call if SBP>180). Pt to have HD today.  O2 sat 100% on 1L per NC.  Capnography continues.  Ileostomy with 75cc serous/blood tinged output.  Site pink.  Abd incision CDI.  Pain controlled with PCA dilaudid at 0.2mg Q10min bumps.  Nausea improved.  LR at 100cc/hr.  Full liquid diet.  Pt stable.  Pleasant without distres.  No issues overnight.  SR/ST .  Continue to monitor s/p laparoscopic colostomy takedown/lysis of adhesions/sigmoidoscopy/loop ileostomy.  Notify MD with any change in status.

## 2018-12-12 NOTE — PLAN OF CARE
D: Hx of heart tx and perforated diverticulitis who underwent a hand-assisted laparoscopic sigmoidectomy with end-colostomy and small bowel resection for fistula in August 2018, now POD1 s/p lap colostomy, PATRICIA, flex sig and DLI    I: Monitored vitals and assessed pt status.   Running: LR @ 50cc/hr, Dilaudid PCA (0.2, 10min, 1.2) PIV, R chest dialysis catheter  PRN: mag replacement protocol    A: A0x4. HR 90s- SR. BP elevated 130-170s/90-100s. RA. Afebrile. Oliguric (no urine this shift). On HD- will have dialysis tomorrow. Mag 1.3- replaced with 4g per protocol. Pt up with assist x1/SBA. Pt refusing OT/PT today d/t pain. Pt refusing meals d/t nausea and vomiting that occurred yesterday after eating. Pt tolerated PO pills ok. Abd/incisional pain getting better as the day progresses- continues on dilaudid PCA, tylenol scheduled. WOC nurses came to see pt and will see pt again tomorrow for ostomy change. Stoma pink- peristomal area TRUONG, output serosang. No stool. Pt pleasant, cooperative.     P: Pain control. Encourage nutrition, activity as tolerated. Continue to monitor Pt status and report changes to treatment team.
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
## 2018-12-12 NOTE — PROGRESS NOTES
CLINICAL NUTRITION SERVICES    Reason for Assessment:  Low residue diet teaching, received consult    Diet History:  Pt s/p lap colostomy takedown, lap extensive lysis of adhesions (60 minutes), flexible sigmoidoscopy, and diverting loop ileostomy.  Pt reports receiving low fiber nutrition education in the past. Note, pt is on HD.     Nutrition Diagnosis:  No nutrition education dx.    Interventions:  Nutrition Education - Provided verbal reinforcement on a low-fiber diet. Gave examples of foods that are well tolerated and foods that are not well tolerated. Answered questions. Rec he continue to follow his HD diet. Provided handout: Nutrition Care Manual handout on Low Fiber Nutrition Therapy.    Goals:    Pt will verbalize two foods high in fiber to avoid and two foods low in fiber to choose.      Follow-up:   Patient to ask any further nutrition-related questions before discharge. In addition, pt may request outpatient RD appointment.    Mary Silva, MS, RD, LD, [facility]   6C Pgr: [phone]

## 2018-12-12 NOTE — PLAN OF CARE
Pt VSS.ST on exertion.Pt has rested all shift.Incision and lap sites CDI and covered.No C/O pain.Good pain control with PCA.Had emesis after full liquid diet.Relief with compazine,but returned at hs when he dangled.Pt needs to take hs meds.Ecs Cellcept.He has not voided,and gets HD.Colostomy CDI,no BM in it.Continue with POC.

## 2018-12-12 NOTE — PROGRESS NOTES
"REGIONAL ANESTHESIA PAIN SERVICE  SUBJECTIVE  Interval history: Patient reports pain is controlled with current analgesics (see below) and nerve block.  Currently rating pain 5/10 at rest and 8/10 with activity.  Patient is tolerating FL diet,  denies nausea.  Denies any weakness, paresthesias, circumoral numbness, metallic taste or tinnitus.       Clinically Aligned Pain Assessment (CAPA):  Comfort (How is your pain?): Tolerable with discomfort  Change in Pain (Since your last medication/intervention?): About the same  Pain Control (How are your pain treatments working?):  Partially effective pain control    Medications related to Pain Management (From now, onward)    Start     Dose/Rate Route Frequency Ordered Stop    12/11/18 1600  acetaminophen (TYLENOL) tablet 1,000 mg      1,000 mg Oral 4 TIMES DAILY 12/11/18 1549      12/11/18 1549  bupivacaine liposome (EXPAREL) LONG ACTING injection was administered into the infiltration site to produce postsurgical analgesia. Duration of action is up to 72 hours, and other \"judith\" medications should not be given for 96 hours with the exception of the lidocaine 5% patch (LIDODERM) and the lidocaine 10mg in potassium infusions. This entry is for INFORMATION ONLY.       Does not apply CONTINUOUS PRN 12/11/18 1549 12/15/18 1548    12/11/18 1549  lidocaine 1 % 1 mL      1 mL Other EVERY 1 HOUR PRN 12/11/18 1549      12/11/18 1549  lidocaine (LMX4) cream       Topical EVERY 1 HOUR PRN 12/11/18 1549      12/11/18 1215  HYDROmorphone (DILAUDID) PCA 1 mg/mL OPIOID NAIVE       Intravenous CONTINUOUS 12/11/18 1208            OBJECTIVE:    BP (!) 165/105 (BP Location: Right arm)   Pulse 93   Temp 98.2  F (36.8  C) (Oral)   Resp 19   Ht 1.753 m (5' 9\")   Wt 73.7 kg (162 lb 7.7 oz)   SpO2 100%   BMI 23.99 kg/m    Exam:  General: alert and no distress  Neuro: Strength 5/5 B/L LE     ASSESSMENT/PLAN:    Murray Nicholson is a 63 year old male with perforated diverticulitis, now POD #1 " s/p  LAPAROSCOPIC ASSISTED COLOSTOMY TAKEDOWN SIGMOIDOSCOPY FLEXIBLE  LAPAROSCOPIC ILEOSTOMY with single shot injection bilateral transversus abdominis plane (TAP) nerve block.  Total bupivacaine 0.25% with epinephrine 1:200,000 20mL total and liposomal (long-acting) bupivacaine (Exparel) 1.3% 20mL total administered 12/11/18 for postop pain control.  Patient is ambulating without difficulty.  No evidence of adverse side effects associated with nerve block injection.  Acheiving adequate pain control, with nerve block, oral and IV analgesics.  Anticipate 48-72 hours of pain control with long-acting local anesthetic.  Patient will continue to require multimodal analgesia for pain not controlled with long-acting local anesthetic.      - NO other local anesthetic use within 96 hours of liposomal bupivacaine (Exparel), unless approved by [facility]  - patient received verbal and written instructions about liposomal bupivacaine and counseling about pharmacologic and nonpharmacologic measures for acute postoperative pain management  - please call [facility] if questions or concerns    VERONICA Medrano CNP  Regional Anesthesia Pain Service ([facility])  12/12/2018 11:58 AM    [facility] Contact Info (for in-house use only):  Job code ID: [id number]   [place] [address] [id number]  [facility] 0602  [email or web address] phone: dial [phone], enter jobcode ID, then enter call-back number.    Text: Use [employer] on the Intranet <Paging/Directory> tab and enter Jobcode ID.   If no call back at any time, contact the hospital  and ask for [facility]APS attending or backup

## 2018-12-12 NOTE — CONSULTS
ADVANCED HEART FAILURE CONSULT NOTE    HPI:  Mr. Nicholson is a 63 year old male with a history of heart transplant 2018 as well as ESRD on hemodialysis listed for kidney transplant. His post-transplant course was complicated by bacteremia, diverticulitis, bowel perforation requiring bowel resection and colostomy in 2018. He has been admitted after colostomy takedown and diverting loop ileostomy. The heart failure service has been consulted for heart transplant immunosuppression management post-operatively.     After transplant he has had normal graft function with no evidence of rejection. He has been on CellCept 1000 BID and tacrolimus 2mg BID as well as prednisone. He was high-risk for CMV and has been off Valcyte due to neutropenia in the setting of infection but his CMV titers have been negative.     On interview he reports some post-op pain and that he didn't sleep well last night but no cardiac complaints. He had some emesis after surgery but has been able to keep down his medications so far. He received his normal immunosuppression medications last night.       ROS otherwise negative.    OBJECTIVE:  Vital signs:  Temp: 97.9  F (36.6  C) Temp  Min: 95.9  F (35.5  C)  Max: 98  F (36.7  C)  Heart Rate: 90 Heart Rate  Min: 83  Max: 99  BP: (!) 153/92 Systolic (24hrs), Av , Min:124 , Max:164   Diastolic (24hrs), Av, Min:73, Max:100    Resp: 19 Resp  Min: 12  Max: 19  SpO2: 100 % SpO2  Min: 100 %  Max: 100 %        Intake/Output Summary (Last 24 hours) at 2018 0749  Last data filed at 2018 0500  Gross per 24 hour   Intake 3088.34 ml   Output 190 ml   Net 2898.34 ml       Vitals:    18 0644   Weight: 73.7 kg (162 lb 7.7 oz)         Physical Exam:  Gen: lying in bed, appears fatigued, no acute distress  HEENT: moist mucosa, PERRL  Resp: clear bilaterally, no r/r/w  CVS: NRRR, no murmurs  Abdo: soft, nondistended  Extremities: warm, no edema  Neuro:awake, alert, oriented    "    Medications:    bupivacaine liposome (EXPAREL) LONG ACTING injection was administered into the infiltration site to produce postsurgical analgesia. Duration of action is up to 72 hours, and other \"judith\" medications should not be given for 96 hours with the exception of the lidocaine 5% patch (LIDODERM) and the lidocaine 10mg in potassium infusions. This entry is for INFORMATION ONLY.       HYDROmorphone       lactated ringers 100 mL/hr at 12/11/18 1253       Current Facility-Administered Medications   Medication Dose Route Frequency     acetaminophen  1,000 mg Oral 4x Daily     amLODIPine  10 mg Oral Daily     beta carotene  25,000 Units Oral BID     chlorhexidine  15 mL Swish & Spit BID     ciprofloxacin  400 mg Intravenous Q24H     cloNIDine  0.1 mg Oral At Bedtime     clotrimazole  1 Kalpana Buccal BID     fluticasone  1-2 spray Both Nostrils Daily     heparin  5,000 Units Subcutaneous Q12H     hydrALAZINE  100 mg Oral Q8H     insulin aspart  1-10 Units Subcutaneous TID AC     insulin aspart  1-7 Units Subcutaneous At Bedtime     [START ON 12/13/2018] insulin isophane human  6 Units Subcutaneous Daily with supper     [START ON 12/13/2018] insulin isophane human  8 Units Subcutaneous QAM AC     lisinopril  5 mg Oral Daily     metroNIDAZOLE  500 mg Intravenous Q8H     mycophenolate  1,000 mg Oral BID     pantoprazole (PROTONIX) IV  40 mg Intravenous Q24H     predniSONE  5 mg Oral Daily     sodium chloride (PF)  3 mL Intracatheter Q8H     sulfamethoxazole-trimethoprim  1 tablet Oral Once per day on Tue Thu Sat     tacrolimus  2 mg Oral BID         Labs:  CMP  Recent Labs   Lab 12/12/18  0612 12/11/18  1153  12/07/18  0827    136   < > 139   POTASSIUM 3.8 3.7   < > 3.9   CHLORIDE 97 102  --  103   CO2 24 23  --  25   BUN 23 16  --  21   CR 5.82* 4.43*  --  4.27*   TRINI 8.8 8.3*  --  8.8   MAG 1.3* 1.2*  --   --    PHOS 3.4  --   --   --    * 117*   < > 155*   ALKPHOS  --   --   --  190*   BILITOTAL  " --   --   --  0.6   AST  --   --   --  15   ALT  --   --   --  16    < > = values in this interval not displayed.     BLOOD GAS  Recent Labs   Lab 12/11/18  0950   PH 7.42   PCO2 39   PO2 243*     CBC  Recent Labs   Lab 12/12/18  0612 12/11/18  1153  12/07/18  0827   WBC  --   --   --  4.0   HGB 9.9* 9.2*   < > 9.9*   HCT  --   --   --  31.7*   PLT  --   --   --  201    < > = values in this interval not displayed.     COAGNo lab results found in last 7 days.      ASSESSMENT/PLAN:  Mr. Nicholson is a 63 year old male with a history of heart transplant 6/14/2018 as well as ESRD on hemodialysis listed for kidney transplant. His post-transplant course was complicated by bacteremia, diverticulitis, bowel perforation requiring bowel resection and colostomy in August 2018. He has been admitted after colostomy takedown and diverting loop ileostomy. The heart failure service has been consulted for heart transplant immunosuppression management post-operatively.     S/p heart transplant 6/14/2018:  - no history of rejection  - home dose immunosuppression: tacrolimus 2mg BID, CellCept 1000 BID, prednisone 5mg  - tacrolimus level today 11.1 (goal 6-8), will reduce dose to 1.5mg BID  - if not tolerating PO can switch medications to IV formulation  - was planned to stop prednisone after last negative biopsy on 12/3 but continued for now due to unstable tacrolimus level  - no other acute cardiac concerns at this time    We will continue to follow along.      Seen and staffed with Dr. Micha Sharpe MD  Advanced Heart Failure Fellow  [phone]  I have reviewed today's vital signs, notes, medications, labs and imaging.  I have also seen and examined the patient and agree with the findings and plan as outlined above.  Pt well known to our service who was admitted for colostomy take down and management of IS.  Goal for tacrolimus is 6-8 and therefore will reduce prograf to 1.5 mg BID.  Will follow closely for inability to take  meds po.     Bobby Muse MD, PhD  Professor, Heart Failure and Cardiac Transplantation  [facility]

## 2018-12-13 LAB
ANION GAP SERPL CALCULATED.3IONS-SCNC: 12 MMOL/L (ref 3–14)
BUN SERPL-MCNC: 27 MG/DL (ref 7–30)
CALCIUM SERPL-MCNC: 8.6 MG/DL (ref 8.5–10.1)
CHLORIDE SERPL-SCNC: 99 MMOL/L (ref 94–109)
CO2 SERPL-SCNC: 22 MMOL/L (ref 20–32)
COPATH REPORT: NORMAL
CREAT SERPL-MCNC: 6.66 MG/DL (ref 0.66–1.25)
GFR SERPL CREATININE-BSD FRML MDRD: 8 ML/MIN/1.7M2
GLUCOSE BLDC GLUCOMTR-MCNC: 106 MG/DL (ref 70–99)
GLUCOSE BLDC GLUCOMTR-MCNC: 120 MG/DL (ref 70–99)
GLUCOSE BLDC GLUCOMTR-MCNC: 132 MG/DL (ref 70–99)
GLUCOSE BLDC GLUCOMTR-MCNC: 199 MG/DL (ref 70–99)
GLUCOSE SERPL-MCNC: 104 MG/DL (ref 70–99)
HGB BLD-MCNC: 9.5 G/DL (ref 13.3–17.7)
MAGNESIUM SERPL-MCNC: 2.3 MG/DL (ref 1.6–2.3)
MYCOPHENOLATE SERPL LC/MS/MS-MCNC: 4.23 MG/L (ref 1–3.5)
MYCOPHENOLATE-G SERPL LC/MS/MS-MCNC: 144.3 MG/L (ref 30–95)
PHOSPHATE SERPL-MCNC: 4.2 MG/DL (ref 2.5–4.5)
POTASSIUM SERPL-SCNC: 4.1 MMOL/L (ref 3.4–5.3)
SODIUM SERPL-SCNC: 132 MMOL/L (ref 133–144)
TME LAST DOSE: ABNORMAL H

## 2018-12-13 PROCEDURE — 80048 BASIC METABOLIC PNL TOTAL CA: CPT | Performed by: COLON & RECTAL SURGERY

## 2018-12-13 PROCEDURE — G0463 HOSPITAL OUTPT CLINIC VISIT: HCPCS

## 2018-12-13 PROCEDURE — 25000131 ZZH RX MED GY IP 250 OP 636 PS 637: Mod: GY | Performed by: INTERNAL MEDICINE

## 2018-12-13 PROCEDURE — 85018 HEMOGLOBIN: CPT | Performed by: COLON & RECTAL SURGERY

## 2018-12-13 PROCEDURE — 36415 COLL VENOUS BLD VENIPUNCTURE: CPT | Performed by: COLON & RECTAL SURGERY

## 2018-12-13 PROCEDURE — 63400005 ZZH RX 634: Performed by: COLON & RECTAL SURGERY

## 2018-12-13 PROCEDURE — 83735 ASSAY OF MAGNESIUM: CPT | Performed by: COLON & RECTAL SURGERY

## 2018-12-13 PROCEDURE — C9113 INJ PANTOPRAZOLE SODIUM, VIA: HCPCS | Performed by: COLON & RECTAL SURGERY

## 2018-12-13 PROCEDURE — 90937 HEMODIALYSIS REPEATED EVAL: CPT

## 2018-12-13 PROCEDURE — 25000131 ZZH RX MED GY IP 250 OP 636 PS 637: Mod: GY | Performed by: COLON & RECTAL SURGERY

## 2018-12-13 PROCEDURE — 25000125 ZZHC RX 250: Performed by: COLON & RECTAL SURGERY

## 2018-12-13 PROCEDURE — 25000128 H RX IP 250 OP 636: Performed by: COLON & RECTAL SURGERY

## 2018-12-13 PROCEDURE — A9270 NON-COVERED ITEM OR SERVICE: HCPCS | Mod: GY | Performed by: COLON & RECTAL SURGERY

## 2018-12-13 PROCEDURE — 84100 ASSAY OF PHOSPHORUS: CPT | Performed by: COLON & RECTAL SURGERY

## 2018-12-13 PROCEDURE — 25000132 ZZH RX MED GY IP 250 OP 250 PS 637: Mod: GY | Performed by: COLON & RECTAL SURGERY

## 2018-12-13 PROCEDURE — 00000146 ZZHCL STATISTIC GLUCOSE BY METER IP

## 2018-12-13 PROCEDURE — 21400006 ZZH R&B CCU INTERMEDIATE UMMC

## 2018-12-13 PROCEDURE — 5A1D70Z PERFORMANCE OF URINARY FILTRATION, INTERMITTENT, LESS THAN 6 HOURS PER DAY: ICD-10-PCS | Performed by: PHYSICIAN ASSISTANT

## 2018-12-13 RX ADMIN — Medication 5000 UNITS: at 11:46

## 2018-12-13 RX ADMIN — CHLORHEXIDINE GLUCONATE 0.12% ORAL RINSE 15 ML: 1.2 LIQUID ORAL at 11:46

## 2018-12-13 RX ADMIN — PREDNISONE 5 MG: 5 TABLET ORAL at 11:46

## 2018-12-13 RX ADMIN — ACETAMINOPHEN 1000 MG: 500 TABLET, FILM COATED ORAL at 16:35

## 2018-12-13 RX ADMIN — TACROLIMUS 1.5 MG: 0.5 CAPSULE ORAL at 07:40

## 2018-12-13 RX ADMIN — ACETAMINOPHEN 1000 MG: 500 TABLET, FILM COATED ORAL at 20:28

## 2018-12-13 RX ADMIN — PANTOPRAZOLE SODIUM 40 MG: 40 INJECTION, POWDER, FOR SOLUTION INTRAVENOUS at 16:43

## 2018-12-13 RX ADMIN — CLOTRIMAZOLE 1 TROCHE: 10 LOZENGE ORAL at 20:30

## 2018-12-13 RX ADMIN — EPOETIN ALFA 8000 UNITS: 10000 SOLUTION INTRAVENOUS; SUBCUTANEOUS at 09:30

## 2018-12-13 RX ADMIN — FLUTICASONE PROPIONATE 2 SPRAY: 50 SPRAY, METERED NASAL at 11:48

## 2018-12-13 RX ADMIN — Medication 5000 UNITS: at 23:47

## 2018-12-13 RX ADMIN — AMLODIPINE BESYLATE 10 MG: 10 TABLET ORAL at 11:47

## 2018-12-13 RX ADMIN — Medication 25000 UNITS: at 20:28

## 2018-12-13 RX ADMIN — CLOTRIMAZOLE 1 TROCHE: 10 LOZENGE ORAL at 11:47

## 2018-12-13 RX ADMIN — CLONIDINE HYDROCHLORIDE 0.1 MG: 0.1 TABLET ORAL at 22:15

## 2018-12-13 RX ADMIN — TACROLIMUS 1.5 MG: 0.5 CAPSULE ORAL at 18:42

## 2018-12-13 RX ADMIN — SODIUM CHLORIDE 300 ML: 9 INJECTION, SOLUTION INTRAVENOUS at 07:40

## 2018-12-13 RX ADMIN — ACETAMINOPHEN 1000 MG: 500 TABLET, FILM COATED ORAL at 11:47

## 2018-12-13 RX ADMIN — PROCHLORPERAZINE EDISYLATE 10 MG: 5 INJECTION INTRAMUSCULAR; INTRAVENOUS at 17:41

## 2018-12-13 RX ADMIN — MYCOPHENOLATE MOFETIL 1000 MG: 250 CAPSULE ORAL at 20:28

## 2018-12-13 RX ADMIN — HYDRALAZINE HYDROCHLORIDE 100 MG: 100 TABLET ORAL at 11:47

## 2018-12-13 RX ADMIN — MYCOPHENOLATE MOFETIL 1000 MG: 250 CAPSULE ORAL at 07:40

## 2018-12-13 RX ADMIN — SODIUM CHLORIDE, POTASSIUM CHLORIDE, SODIUM LACTATE AND CALCIUM CHLORIDE: 600; 310; 30; 20 INJECTION, SOLUTION INTRAVENOUS at 23:53

## 2018-12-13 RX ADMIN — CHLORHEXIDINE GLUCONATE 0.12% ORAL RINSE 15 ML: 1.2 LIQUID ORAL at 20:30

## 2018-12-13 RX ADMIN — HYDRALAZINE HYDROCHLORIDE 100 MG: 100 TABLET ORAL at 23:47

## 2018-12-13 RX ADMIN — HYDRALAZINE HYDROCHLORIDE 100 MG: 100 TABLET ORAL at 16:35

## 2018-12-13 RX ADMIN — LISINOPRIL 5 MG: 5 TABLET ORAL at 11:47

## 2018-12-13 RX ADMIN — SULFAMETHOXAZOLE AND TRIMETHOPRIM 1 TABLET: 400; 80 TABLET ORAL at 18:42

## 2018-12-13 RX ADMIN — Medication: at 07:41

## 2018-12-13 RX ADMIN — Medication 25000 UNITS: at 11:46

## 2018-12-13 RX ADMIN — SODIUM CHLORIDE 250 ML: 9 INJECTION, SOLUTION INTRAVENOUS at 07:41

## 2018-12-13 ASSESSMENT — ACTIVITIES OF DAILY LIVING (ADL)
ADLS_ACUITY_SCORE: 15

## 2018-12-13 ASSESSMENT — MIFFLIN-ST. JEOR
SCORE: 1550.6
SCORE: 1549.38

## 2018-12-13 ASSESSMENT — PAIN DESCRIPTION - DESCRIPTORS
DESCRIPTORS: SHARP
DESCRIPTORS: SHARP

## 2018-12-13 NOTE — PLAN OF CARE
Pt A/O. See flowsheets for VS, BPs running high. NSR/ST. RA. Pain across abd, managed with scheduled Tylenol + PCA pump, see MAR. LR continues @ 50ml/hr. BG not assessed at dinnertime as pt eating before BG could be obtained. No sliding scale insulin given at bedtime, . Illeostomy in place. Serosang drainage, + stool possibly starting (dark/thick drainage noted, colorectal cross cover text paged regarding). Bowel sounds hard to auscultate, possibly not noted/hypoactive, colorectal cross cover notified regarding, no further orders at this time. Pt endorsing some nausea this evening, having to spit out half of josefina lozenge per pt report. Dialysis planned for tomorrow, wanting to get pt at 0730 per report given. Continue with plan of care and report changes to treatment team.

## 2018-12-13 NOTE — PLAN OF CARE
OT/6C-- Pt in dialysis this AM, unable to check back in PM due to scheduling. Will rescheduling per POC.

## 2018-12-13 NOTE — PROGRESS NOTES
HEMODIALYSIS TREATMENT NOTE    Date: 12/13/2018  Time: 11:22 AM    Data:  Pre Wt:  76.5    Desired Wt: 76.5 kg   Post Wt:  76.4   Weight gain:   0kg   Weight change:-0.1    kg  Ultrafiltration - Post Run Net Total Removed (mL): 0 mL  Ultrafiltration - Post Run Net Total Gain (mL): 0 mL  Vascular Access Status: Yes, secured and visible  Dialyzer Rinse: Clear  Total Blood Volume Processed: 80.3  Total Dialysis (Treatment) Time: 3.5 hrs      Lab:   No    Interventions:  Catheter dressing changed, blood sugar checked, epogen given. Anti rejection medications administered.    Assessment:  Pt does not appear fluid up or endorse feeling such, no SOB reported. Pt has PCA for pain control using it once. Treatment unremarkable.     Plan:    Will continue to monitor and follow POC per renal team.

## 2018-12-13 NOTE — PROGRESS NOTES
Nephrology Progress Note  12/13/2018         Assessment & Recommendations:   Murray Nicholson is a 63 year old male with OHT (6/2018), ESRD, HTN, perforated diverticulitis s/p sigmoidectomy with end-colostomy and small bowel resection for fistula in August 2018, admitted now s/p lap colostomy take down now with ileostomy.      ESRD:  2/2 HTN and diabetes (heart failure worsened after ESRD dx so unlikely CRS as etiology of ESRD). Dialyzes TTS at [facility] under the care of Dr. Mcpherson. Run time: 4 hrs. EDW: 74 kg (though pt feels it should be higher). Access: tunneled RIJ (has had vein mapping and plan for fistula)  - Dialysis per TTS schedule     BP: variable 130-150's; usual pre HD pressures 130-150's, post pressures 120-140's, lowest pressure ~ 100. PTA  amlodipine 10 mg qday, clonidine 0.1 mg q HS, hydralazine 100 mg tid, lisinopril 5 mg qday     Volume: EDW: 74 kg, though pt would like to increase as he feels very tired at this weight. Appears euvolemic. Minimal UOP. Usual UF 0.5 to 1.5 kg. Ileostomy with minimal outpt.  - Daily weights  - Will increase EDW to 76-76.5 kg (pt feels much better at a higher dry weight)  - No UF today  - Recommend stopping IVF (elevated BP's, minimal UOP/ostomy output)     Anemia: hgb 9.5; recent labs with hgb 10's, on epogen 8000 units per HD, venofer 50 mg qTuesday  - Continue epogen and venofer via HD     BMD: Ca 8.6, phos 4.2; recent . Not on Vit D or phos binder     OHT: on predisone, tacrolimus, mycophenolate     S/p lap colostomy takedown with ileostomy, per surgery team      Recommendations were communicated to primary team via this note.       Kristi Armas PA-C  Division of Kidney Disease  [phone]    Interval History :   Seen on dialysis, stable run with no UF. Patient feels better at higher EDW; in the past x 2, even when he has some LE edema, RHC shows very low RA pressures. Denies CP, SOB, chills.    Review of Systems:   4 point ROS negative other  "than as noted above.    Physical Exam:   I/O last 3 completed shifts:  In: 1745 [P.O.:340; I.V.:1405]  Out: 230 [Urine:80; Stool:150]   /76   Pulse 93   Temp 98  F (36.7  C) (Oral)   Resp 16   Ht 1.753 m (5' 9\")   Wt 76.5 kg (168 lb 11.2 oz)   SpO2 100%   BMI 24.91 kg/m       GENERAL APPEARANCE: alert, NAD  EYES: no scleral icterus, pupils equal  Pulmonary: lungs clear to auscultation with equal breath sounds bilaterally   CV: regular rhythm, normal rate    - JVD flat   - Edema none  GI: soft, new ileostomy   MS: no evidence of inflammation in joints, no muscle tenderness  : no doyle  SKIN: no rash, warm, dry, no cyanosis  NEURO: speech and mentation intact   Access: tunneled RIJ        Labs:   All labs reviewed by me  Electrolytes/Renal -   Recent Labs   Lab Test 12/13/18  0632 12/12/18  1618 12/12/18  0612 12/11/18  1153  12/03/18  0845   *  --  133 136   < > 138   POTASSIUM 4.1  --  3.8 3.7   < > 4.2   CHLORIDE 99  --  97 102   < > 103   CO2 22  --  24 23   < > 24   BUN 27  --  23 16   < > 42*   CR 6.66*  --  5.82* 4.43*   < > 6.15*   *  --  112* 117*   < > 192*   TRINI 8.6  --  8.8 8.3*   < > 9.4   MAG 2.3 2.5* 1.3* 1.2*  --  1.3*   PHOS 4.2  --  3.4  --   --  3.0    < > = values in this interval not displayed.       CBC -   Recent Labs   Lab Test 12/13/18  0632 12/12/18  0612 12/11/18  1153  12/07/18  0827 12/03/18  0845 11/26/18  1322   WBC  --   --   --   --  4.0 5.0 4.9   HGB 9.5* 9.9* 9.2*   < > 9.9* 10.8* 11.5*   PLT  --   --   --   --  201 231 234    < > = values in this interval not displayed.       LFTs -   Recent Labs   Lab Test 12/07/18  0827 12/03/18  0845 11/13/18  0849 10/26/18  0739   ALKPHOS 190* 181* 187* 155*   BILITOTAL 0.6 0.5 0.5 0.5   ALT 16 19 16 19   AST 15 19 16 15   PROTTOTAL 6.4* 6.8  --  7.3   ALBUMIN 2.9* 3.2*  --  3.0*       Iron Panel -   Recent Labs   Lab Test 07/10/18  0711 05/08/18  0954 07/19/17  1306   IRON 66 58 46   IRONSAT 28 27 18   ALBERTINA 771* 621* " "369         Imaging:  Reviewed    Current Medications:    acetaminophen  1,000 mg Oral 4x Daily     amLODIPine  10 mg Oral Daily     beta carotene  25,000 Units Oral BID     chlorhexidine  15 mL Swish & Spit BID     cloNIDine  0.1 mg Oral At Bedtime     clotrimazole  1 Kalpana Buccal BID     epoetin emily (EPOGEN,PROCRIT) inj ESRD  8,000 Units Intravenous Once in dialysis     fluticasone  1-2 spray Both Nostrils Daily     gelatin absorbable  1 each Topical During Hemodialysis (from stock)     heparin  5,000 Units Subcutaneous Q12H     hydrALAZINE  100 mg Oral Q8H     insulin aspart  1-10 Units Subcutaneous TID AC     insulin aspart  1-7 Units Subcutaneous At Bedtime     lisinopril  5 mg Oral Daily     mycophenolate  1,000 mg Oral BID     pantoprazole (PROTONIX) IV  40 mg Intravenous Q24H     predniSONE  5 mg Oral Daily     sodium chloride (PF)  3 mL Intracatheter Q8H     sodium chloride (PF)  9 mL Intracatheter During Hemodialysis (from stock)     sodium chloride (PF)  9 mL Intracatheter During Hemodialysis (from stock)     sulfamethoxazole-trimethoprim  1 tablet Oral Once per day on Tue Thu Sat     tacrolimus  1.5 mg Oral BID       bupivacaine liposome (EXPAREL) LONG ACTING injection was administered into the infiltration site to produce postsurgical analgesia. Duration of action is up to 72 hours, and other \"judith\" medications should not be given for 96 hours with the exception of the lidocaine 5% patch (LIDODERM) and the lidocaine 10mg in potassium infusions. This entry is for INFORMATION ONLY.       HYDROmorphone       lactated ringers 50 mL/hr at 12/12/18 2300     Kristi M SPENCER Armas  "

## 2018-12-13 NOTE — PLAN OF CARE
Afebrile.  's/90's.  Received scheduled clonidine at hs.  Recheck /87.  No complaints overnight.  O2 sat briefly dipping to 70-80's while asleep then returning to 100%.  Pt in no apparent distress. Does endorse hx sleep apnea.  States does not wear device at home. Pt placed on 2L O2 at night.  Maintaining sats at 100%. Abdominal incision CDI.  Ileostomy stoma pink.  Appears stool starting to form at stoma. Pain controlled with dilaudid PCA.  LR continues at 50cc/hr.  Continue to monitor s/p lap colostomy take down/ileostomy.  Address BIPAP needs. HD planned for today.

## 2018-12-13 NOTE — PROGRESS NOTES
"  WO Nurse Inpatient [facility]   WO Nurse Inpatient Adult     Initial Assessment   Assessment of new loop Ileostomy in same opening as previous Colostomy.  Stoma complication(s) none   Mucocutaneous junction; TRUONG, did not remove pouch today   Peristomal complication(s) TRUONG, did not remove pouch today   Pouch wear time:24-48 hours  Following today's visit:Patient / Patient  is  able to demonstrate;       1. How to empty their pouch? no      2. How to change their pouch?  no      3. How to read and record intake and output correctly? no    Objective data:  Patient history according to medical record: 62 YO M with w/ hx heart transplant and perforated diverticulitis who underwent a hand-assisted laparoscopic sigmoidectomy with end-colostomy and small bowel resection for fistula in August 2018, now POD1 s/p lap colostomy, PATRICIA, flex sig and DLI (12/11). He is recovering appropriately, awaiting ROBF.    Current Diet/Nutrition: Orders Placed This Encounter      Low Fiber Diet     TPN no   I/O last 3 completed shifts:  In: 1745 [P.O.:340; I.V.:1405]  Out: 230 [Urine:80; Stool:150]  Labs:    Recent Labs   Lab 12/13/18  0632  12/07/18  0827   ALBUMIN  --   --  2.9*   HGB 9.5*   < > 9.9*   WBC  --   --  4.0   CRP  --   --  12.0*    < > = values in this interval not displayed.        Physical Exam:  Current pouching system:Geyser 57 mm 2 piece cut to fit with barrier ring.  Pt prefers one piece cut to fit pouch.  Will convert before discharge.    Reason for pouch change today: ostomy education  Stoma appearance: viable, healthy, pink-red, moist, edematous and protruberant  Stoma size; 1 3/4\"  Peristomal skin: not visualized (barrier in place)  Stoma output :brown and liquid   Abdominal  Assessment  firm , NG still in place? No  Surgical Site: open to air  Pain: Sharp and Stabbing  Is patient still on a PCA Yes    Interventions:  Patient's chart evaluated.  Focus of today's visit: verbal instruction  and diet " and hydration    Participant of teaching session today patient   Orders: Reviewed  Change made with ostomy management today: No  Patient/family: lethargic  Supplies:at bedside    Plan:  Learning needs: refitting of appliance, pouch change return demonstration, pouch emptying, output measurement, lifestyle adjustments and discharge instructions  Preparation for discharge: No discharge preparations started  Recommend home care? yes and by home WOC nurse if possible    Discussed plan of care with Patient  Nursing to notify the Provider(s) and re-consult the WOC Nurse if new ostomy concerns or discharge planned before next planned WOC visit.    WOC Nurse will return: Thursday  Face to face time: 30 minutes    Shante KHALILN RN CWOCN

## 2018-12-13 NOTE — PROGRESS NOTES
D: Hx of heart tx and perforated diverticulitis who underwent a hand-assisted laparoscopic sigmoidectomy with end-colostomy and small bowel resection for fistula in August 2018, now POD1 s/p lap colostomy, PATRICIA, flex sig and DLI     I: Monitored vitals and assessed pt status.   Running: LR @ 50cc/hr, Dilaudid PCA (0.2, 10min, 1.2) PIV, R chest dialysis catheter   PRN: mag replacement protocol     A: A0x4. HR 90s- SR. BP WNL today. RA. Afebrile. Oliguric (no urine this shift). On HD- done today. Pt up with SBA- ind in room. PT/OT following. Pt eating minimal amounts- now on low fiber diet. BG checks WNL. Pt tolerated PO pills ok. No nausea. Minimal pain- using dilaudid PCA occasionally, along with scheduled tylenol. WOC nurses came to see pt and changed ostomy bag. Stoma pink- peristomal area TRUONG, loose brown stool output. Pt pleasant, cooperative.      P: Encourage nutrition, activity as tolerated. Continue to monitor Pt status and report changes to treatment team.

## 2018-12-13 NOTE — PROGRESS NOTES
Colorectal Surgery Progress Note  POD#2      Subjective: No acute events. Denies nausea or vomiting. Pain well controlled. Tolerating FLD. HD today.     Vitals:  Vitals:    12/13/18 0323 12/13/18 0400 12/13/18 0445 12/13/18 0619   BP:   137/87    BP Location:   Right arm    Cuff Size:   Adult Regular    Pulse:       Resp: 16  16    Temp:   98.3  F (36.8  C)    TempSrc:   Oral    SpO2: 99% 100% 100%    Weight:    76.5 kg (168 lb 11.2 oz)   Height:         I/O:  I/O last 3 completed shifts:  In: 1745 [P.O.:340; I.V.:1405]  Out: 230 [Urine:80; Stool:150]    Physical Exam:  Gen: AAOx3, NAD  Pulm: Non-labored breathing on NC  Abd: Soft, non-distended, appropriately tender, incisions c/d/i Ileostomy pink w/ some gas or loose stool  Ext:  Warm and well-perfused    BMP  Recent Labs   Lab 12/13/18  0632 12/12/18  1618 12/12/18  0612 12/11/18  1153 12/11/18  0950  12/07/18  0827   *  --  133 136 135  --  139   POTASSIUM 4.1  --  3.8 3.7 3.2*   < > 3.9   CHLORIDE 99  --  97 102  --   --  103   CO2 22  --  24 23  --   --  25   BUN 27  --  23 16  --   --  21   CR 6.66*  --  5.82* 4.43*  --   --  4.27*   *  --  112* 117* 83  --  155*   MAG 2.3 2.5* 1.3* 1.2*  --   --   --    PHOS 4.2  --  3.4  --   --   --   --     < > = values in this interval not displayed.     CBC  Recent Labs   Lab 12/13/18  0632 12/12/18  0612 12/11/18  1153 12/11/18  0950 12/07/18  0827   WBC  --   --   --   --  4.0   HGB 9.5* 9.9* 9.2* 8.8* 9.9*   HCT  --   --   --   --  31.7*   PLT  --   --   --   --  201         ASSESSMENT: 64 YO M with w/ hx heart transplant and perforated diverticulitis who underwent a hand-assisted laparoscopic sigmoidectomy with end-colostomy and small bowel resection for fistula in August 2018, now POD1 s/p lap colostomy, PATRICIA, flex sig and DLI (12/11). He is recovering appropriately, awaiting ROBF.    -Neuro: tylenol krissy, dilaudid PCA, melatonin  -CV:  -Heart failure service consulted for hx of heart transplant      immunosuppression with predisone, tacrolimus, mycophenolate    -Continue PTA amlodipine, clonidine, hydralazine, lisinopril  -Resp: Flonase  -FEN/GI: FLD advance to LRD, IVF.   -Endo: Insulin   -Renal: On HD, Nephrology following. Due for HD today  -ID: Finished Periop cipro/flagyl  -Ppx: HSQ TID, PPI, bactrim      Patient seen with fellow, Dr. Sood, who discussed with staff.    Susie Linares MD  General Surgery, PGY-3  [phone]

## 2018-12-14 ENCOUNTER — APPOINTMENT (OUTPATIENT)
Dept: OCCUPATIONAL THERAPY | Facility: CLINIC | Age: 63
DRG: 329 | End: 2018-12-14
Attending: COLON & RECTAL SURGERY
Payer: MEDICARE

## 2018-12-14 ENCOUNTER — APPOINTMENT (OUTPATIENT)
Dept: PHYSICAL THERAPY | Facility: CLINIC | Age: 63
DRG: 329 | End: 2018-12-14
Attending: COLON & RECTAL SURGERY
Payer: MEDICARE

## 2018-12-14 LAB
ANION GAP SERPL CALCULATED.3IONS-SCNC: 10 MMOL/L (ref 3–14)
BUN SERPL-MCNC: 14 MG/DL (ref 7–30)
CALCIUM SERPL-MCNC: 8.5 MG/DL (ref 8.5–10.1)
CHLORIDE SERPL-SCNC: 93 MMOL/L (ref 94–109)
CO2 SERPL-SCNC: 28 MMOL/L (ref 20–32)
CREAT SERPL-MCNC: 4.37 MG/DL (ref 0.66–1.25)
GFR SERPL CREATININE-BSD FRML MDRD: 14 ML/MIN/1.7M2
GLUCOSE BLDC GLUCOMTR-MCNC: 158 MG/DL (ref 70–99)
GLUCOSE BLDC GLUCOMTR-MCNC: 200 MG/DL (ref 70–99)
GLUCOSE BLDC GLUCOMTR-MCNC: 222 MG/DL (ref 70–99)
GLUCOSE BLDC GLUCOMTR-MCNC: 91 MG/DL (ref 70–99)
GLUCOSE SERPL-MCNC: 126 MG/DL (ref 70–99)
HGB BLD-MCNC: 9.4 G/DL (ref 13.3–17.7)
MAGNESIUM SERPL-MCNC: 1.8 MG/DL (ref 1.6–2.3)
PHOSPHATE SERPL-MCNC: 3.4 MG/DL (ref 2.5–4.5)
POTASSIUM SERPL-SCNC: 3.5 MMOL/L (ref 3.4–5.3)
SODIUM SERPL-SCNC: 132 MMOL/L (ref 133–144)
TACROLIMUS BLD-MCNC: 18.4 UG/L (ref 5–15)
TME LAST DOSE: ABNORMAL H

## 2018-12-14 PROCEDURE — 00000146 ZZHCL STATISTIC GLUCOSE BY METER IP

## 2018-12-14 PROCEDURE — 40000903 ZZH STATISTIC WOC PT EDUCATION, 31-45 MIN

## 2018-12-14 PROCEDURE — A9270 NON-COVERED ITEM OR SERVICE: HCPCS | Mod: GY | Performed by: COLON & RECTAL SURGERY

## 2018-12-14 PROCEDURE — 25000132 ZZH RX MED GY IP 250 OP 250 PS 637: Mod: GY | Performed by: COLON & RECTAL SURGERY

## 2018-12-14 PROCEDURE — 80197 ASSAY OF TACROLIMUS: CPT | Performed by: COLON & RECTAL SURGERY

## 2018-12-14 PROCEDURE — 97530 THERAPEUTIC ACTIVITIES: CPT | Mod: GP

## 2018-12-14 PROCEDURE — 99232 SBSQ HOSP IP/OBS MODERATE 35: CPT | Mod: GC | Performed by: INTERNAL MEDICINE

## 2018-12-14 PROCEDURE — 25000131 ZZH RX MED GY IP 250 OP 636 PS 637: Mod: GY | Performed by: COLON & RECTAL SURGERY

## 2018-12-14 PROCEDURE — 84100 ASSAY OF PHOSPHORUS: CPT | Performed by: COLON & RECTAL SURGERY

## 2018-12-14 PROCEDURE — 97110 THERAPEUTIC EXERCISES: CPT | Mod: GO

## 2018-12-14 PROCEDURE — 40000133 ZZH STATISTIC OT WARD VISIT

## 2018-12-14 PROCEDURE — 36415 COLL VENOUS BLD VENIPUNCTURE: CPT | Performed by: COLON & RECTAL SURGERY

## 2018-12-14 PROCEDURE — 80048 BASIC METABOLIC PNL TOTAL CA: CPT | Performed by: COLON & RECTAL SURGERY

## 2018-12-14 PROCEDURE — 21400006 ZZH R&B CCU INTERMEDIATE UMMC

## 2018-12-14 PROCEDURE — 25000131 ZZH RX MED GY IP 250 OP 636 PS 637: Mod: GY | Performed by: INTERNAL MEDICINE

## 2018-12-14 PROCEDURE — 83735 ASSAY OF MAGNESIUM: CPT | Performed by: COLON & RECTAL SURGERY

## 2018-12-14 PROCEDURE — 25000125 ZZHC RX 250: Performed by: COLON & RECTAL SURGERY

## 2018-12-14 PROCEDURE — 40000193 ZZH STATISTIC PT WARD VISIT

## 2018-12-14 PROCEDURE — 25000128 H RX IP 250 OP 636: Performed by: COLON & RECTAL SURGERY

## 2018-12-14 PROCEDURE — 97530 THERAPEUTIC ACTIVITIES: CPT | Mod: GO

## 2018-12-14 PROCEDURE — 97165 OT EVAL LOW COMPLEX 30 MIN: CPT | Mod: GO

## 2018-12-14 PROCEDURE — 85018 HEMOGLOBIN: CPT | Performed by: COLON & RECTAL SURGERY

## 2018-12-14 PROCEDURE — 97161 PT EVAL LOW COMPLEX 20 MIN: CPT | Mod: GP

## 2018-12-14 RX ORDER — OXYCODONE HYDROCHLORIDE 5 MG/1
5-10 TABLET ORAL EVERY 4 HOURS PRN
Status: DISCONTINUED | OUTPATIENT
Start: 2018-12-14 | End: 2018-12-14

## 2018-12-14 RX ORDER — TRAMADOL HYDROCHLORIDE 50 MG/1
50-100 TABLET ORAL EVERY 12 HOURS PRN
Status: DISCONTINUED | OUTPATIENT
Start: 2018-12-14 | End: 2018-12-17 | Stop reason: HOSPADM

## 2018-12-14 RX ORDER — HYDROMORPHONE HYDROCHLORIDE 1 MG/ML
.3-.5 INJECTION, SOLUTION INTRAMUSCULAR; INTRAVENOUS; SUBCUTANEOUS
Status: DISCONTINUED | OUTPATIENT
Start: 2018-12-14 | End: 2018-12-14

## 2018-12-14 RX ORDER — HYDROMORPHONE HCL/0.9% NACL/PF 0.2MG/0.2
.2-.4 SYRINGE (ML) INTRAVENOUS
Status: DISCONTINUED | OUTPATIENT
Start: 2018-12-14 | End: 2018-12-17 | Stop reason: HOSPADM

## 2018-12-14 RX ADMIN — TRAMADOL HYDROCHLORIDE 100 MG: 50 TABLET, COATED ORAL at 20:38

## 2018-12-14 RX ADMIN — HYDRALAZINE HYDROCHLORIDE 100 MG: 100 TABLET ORAL at 16:54

## 2018-12-14 RX ADMIN — MYCOPHENOLATE MOFETIL 1000 MG: 250 CAPSULE ORAL at 20:37

## 2018-12-14 RX ADMIN — ACETAMINOPHEN 1000 MG: 500 TABLET, FILM COATED ORAL at 08:27

## 2018-12-14 RX ADMIN — PREDNISONE 5 MG: 5 TABLET ORAL at 08:28

## 2018-12-14 RX ADMIN — CLOTRIMAZOLE 1 TROCHE: 10 LOZENGE ORAL at 20:37

## 2018-12-14 RX ADMIN — CHLORHEXIDINE GLUCONATE 0.12% ORAL RINSE 15 ML: 1.2 LIQUID ORAL at 08:28

## 2018-12-14 RX ADMIN — TACROLIMUS 1.5 MG: 0.5 CAPSULE ORAL at 08:28

## 2018-12-14 RX ADMIN — MYCOPHENOLATE MOFETIL 1000 MG: 250 CAPSULE ORAL at 08:28

## 2018-12-14 RX ADMIN — Medication 5000 UNITS: at 12:10

## 2018-12-14 RX ADMIN — HYDRALAZINE HYDROCHLORIDE 100 MG: 100 TABLET ORAL at 08:28

## 2018-12-14 RX ADMIN — CHLORHEXIDINE GLUCONATE 0.12% ORAL RINSE 15 ML: 1.2 LIQUID ORAL at 20:42

## 2018-12-14 RX ADMIN — CLONIDINE HYDROCHLORIDE 0.1 MG: 0.1 TABLET ORAL at 21:30

## 2018-12-14 RX ADMIN — Medication 25000 UNITS: at 08:27

## 2018-12-14 RX ADMIN — INSULIN ASPART 1 UNITS: 100 INJECTION, SOLUTION INTRAVENOUS; SUBCUTANEOUS at 17:02

## 2018-12-14 RX ADMIN — PROCHLORPERAZINE EDISYLATE 10 MG: 5 INJECTION INTRAMUSCULAR; INTRAVENOUS at 17:13

## 2018-12-14 RX ADMIN — PROCHLORPERAZINE EDISYLATE 10 MG: 5 INJECTION INTRAMUSCULAR; INTRAVENOUS at 11:17

## 2018-12-14 RX ADMIN — HYDRALAZINE HYDROCHLORIDE 100 MG: 100 TABLET ORAL at 23:15

## 2018-12-14 RX ADMIN — ACETAMINOPHEN 1000 MG: 500 TABLET, FILM COATED ORAL at 16:54

## 2018-12-14 RX ADMIN — FLUTICASONE PROPIONATE 1 SPRAY: 50 SPRAY, METERED NASAL at 08:29

## 2018-12-14 RX ADMIN — Medication 5000 UNITS: at 23:14

## 2018-12-14 RX ADMIN — ACETAMINOPHEN 1000 MG: 500 TABLET, FILM COATED ORAL at 12:10

## 2018-12-14 RX ADMIN — AMLODIPINE BESYLATE 10 MG: 10 TABLET ORAL at 08:28

## 2018-12-14 RX ADMIN — CLOTRIMAZOLE 1 TROCHE: 10 LOZENGE ORAL at 08:28

## 2018-12-14 RX ADMIN — LISINOPRIL 5 MG: 5 TABLET ORAL at 08:28

## 2018-12-14 RX ADMIN — ACETAMINOPHEN 1000 MG: 500 TABLET, FILM COATED ORAL at 21:30

## 2018-12-14 RX ADMIN — Medication 25000 UNITS: at 20:36

## 2018-12-14 ASSESSMENT — ACTIVITIES OF DAILY LIVING (ADL)
ADLS_ACUITY_SCORE: 15
PREVIOUS_RESPONSIBILITIES: MEAL PREP;HOUSEKEEPING;LAUNDRY;SHOPPING;MEDICATION MANAGEMENT;DRIVING
ADLS_ACUITY_SCORE: 15

## 2018-12-14 ASSESSMENT — PAIN DESCRIPTION - DESCRIPTORS
DESCRIPTORS: ACHING
DESCRIPTORS: SHARP
DESCRIPTORS: ACHING
DESCRIPTORS: ACHING

## 2018-12-14 ASSESSMENT — MIFFLIN-ST. JEOR: SCORE: 1563.3

## 2018-12-14 NOTE — PROGRESS NOTES
Colorectal Surgery Progress Note  POD#3      Subjective: No acute events. Pain well controlled. Tolerating LRD.     Vitals:  Vitals:    12/13/18 2024 12/13/18 2214 12/13/18 2350 12/14/18 0359   BP: 135/83 126/77 123/77    BP Location: Right arm Right arm Right arm    Cuff Size:       Pulse:       Resp: 20  18 16   Temp: 98.8  F (37.1  C)  98.4  F (36.9  C)    TempSrc: Oral  Oral    SpO2: 99% 100% 100% 100%   Weight:       Height:         I/O:  I/O last 3 completed shifts:  In: 1609.17 [P.O.:360; I.V.:1249.17]  Out: 205 [Urine:205]    Physical Exam:  Gen: AAOx3, NAD  Pulm: Non-labored breathing   Abd: Soft, non-distended, appropriately tender, incisions c/d/i Ileostomy pink w/ some gas or loose stool  Ext:  Warm and well-perfused    BMP  Recent Labs   Lab 12/14/18  0512 12/13/18  0632 12/12/18  1618 12/12/18  0612 12/11/18  1153   * 132*  --  133 136   POTASSIUM 3.5 4.1  --  3.8 3.7   CHLORIDE 93* 99  --  97 102   CO2 28 22  --  24 23   BUN 14 27  --  23 16   CR 4.37* 6.66*  --  5.82* 4.43*   * 104*  --  112* 117*   MAG 1.8 2.3 2.5* 1.3* 1.2*   PHOS 3.4 4.2  --  3.4  --      CBC  Recent Labs   Lab 12/14/18  0512 12/13/18  0632 12/12/18  0612 12/11/18  1153  12/07/18  0827   WBC  --   --   --   --   --  4.0   HGB 9.4* 9.5* 9.9* 9.2*   < > 9.9*   HCT  --   --   --   --   --  31.7*   PLT  --   --   --   --   --  201    < > = values in this interval not displayed.         ASSESSMENT: 64 YO M with w/ hx heart transplant and perforated diverticulitis who underwent a hand-assisted laparoscopic sigmoidectomy with end-colostomy and small bowel resection for fistula in August 2018, now POD3 s/p lap colostomy takedown, PATRICIA, flex sig and DLI (12/11). He is recovering appropriately, awaiting ROBF.    -Neuro: tylenol krissy, discontinue dilaudid PCA, melatonin, start PO oxy and PRN IV dilaudid  -CV:  -Heart failure service consulted for hx of heart transplant     immunosuppression with predisone, tacrolimus,  mycophenolate    -Continue PTA amlodipine, clonidine, hydralazine, lisinopril  -Resp: Flonase  -FEN/GI: LRD, discontinue IVF.   -Endo: Insulin   -Renal: On HD, Nephrology following. Appreciate recs  -ID: Finished Periop cipro/flagyl  -Ppx: HSQ TID, PPI, bactrim      Patient seen with fellow, Dr. Sood, who discussed with staff.    Susie Linares MD  General Surgery, PGY-3  [phone]

## 2018-12-14 NOTE — PLAN OF CARE
Discharge Planner OT   Patient plan for discharge: Strongly prefers home  Current status: Initial eval completed, treatment indicated. Facilitated functional ambulation and strengthening/endurance through LBB ~10 minutes. Pt ambulated ~80 feet x2 in hallway CGA with unilateral support with some path deviation. PT to initiate. Pt presenting with emesis upon exertion, resolving with medication.   Barriers to return to prior living situation: deconditioning, medical stability   Recommendations for discharge: TCU  Rationale for recommendations: Pt below functional baseline in I/ADLs. Pt presenting with decreased strength and balance deficits, requiring 28 stairs at home leading to need for further assessment from PT. Pt would benefit from continued therapy to promote activity tolerance for I/ADLs.        Entered by: Maeve Maravilla 12/14/2018 1:09 PM

## 2018-12-14 NOTE — PROGRESS NOTES
"ADVANCED HEART FAILURE PROGRESS NOTE    SUBJECTIVE:  No acute events. Pain reasonably controlled with PCA. Tolerating PO.    ROS otherwise negative.    OBJECTIVE:  Vital signs:  Temp: 98.6  F (37  C) Temp  Min: 98  F (36.7  C)  Max: 98.8  F (37.1  C)  Heart Rate: 98 Heart Rate  Min: 87  Max: 105  BP: 137/85 Systolic (24hrs), Av , Min:123 , Max:154   Diastolic (24hrs), Av, Min:77, Max:86    Resp: 18 Resp  Min: 16  Max: 20  SpO2: 100 % SpO2  Min: 99 %  Max: 100 %      Intake/Output Summary (Last 24 hours) at 2018 1747  Last data filed at 2018 1600  Gross per 24 hour   Intake 1965.17 ml   Output 259 ml   Net 1706.17 ml       Vitals:    18 0619 18 1105 18 0748   Weight: 76.5 kg (168 lb 11.2 oz) 76.4 kg (168 lb 6.9 oz) 77.8 kg (171 lb 8 oz)       Physical Exam:  Gen: lying in bed, no distress  HEENT: moist mucosa, PERRL  Resp: clear bilaterally, no r/r/w  CVS: NRRR, no murmurs  Abdo: soft, nondistended  Extremities: warm, no edema  Neuro:awake, alert, oriented           Medications:    bupivacaine liposome (EXPAREL) LONG ACTING injection was administered into the infiltration site to produce postsurgical analgesia. Duration of action is up to 72 hours, and other \"judith\" medications should not be given for 96 hours with the exception of the lidocaine 5% patch (LIDODERM) and the lidocaine 10mg in potassium infusions. This entry is for INFORMATION ONLY.         Current Facility-Administered Medications   Medication Dose Route Frequency     acetaminophen  1,000 mg Oral 4x Daily     amLODIPine  10 mg Oral Daily     beta carotene  25,000 Units Oral BID     chlorhexidine  15 mL Swish & Spit BID     cloNIDine  0.1 mg Oral At Bedtime     clotrimazole  1 Kalpana Buccal BID     fluticasone  1-2 spray Both Nostrils Daily     heparin  5,000 Units Subcutaneous Q12H     hydrALAZINE  100 mg Oral Q8H     insulin aspart  1-10 Units Subcutaneous TID AC     insulin aspart  1-7 Units Subcutaneous At " Bedtime     lisinopril  5 mg Oral Daily     mycophenolate  1,000 mg Oral BID     predniSONE  5 mg Oral Daily     sodium chloride (PF)  3 mL Intracatheter Q8H     sulfamethoxazole-trimethoprim  1 tablet Oral Once per day on Tue Thu Sat         Labs:  CMP  Recent Labs   Lab 12/14/18  0512 12/13/18  0632   * 132*   POTASSIUM 3.5 4.1   CHLORIDE 93* 99   CO2 28 22   BUN 14 27   CR 4.37* 6.66*   TRINI 8.5 8.6   MAG 1.8 2.3   PHOS 3.4 4.2   * 104*     BLOOD GAS  Recent Labs   Lab 12/11/18  0950   PH 7.42   PCO2 39   PO2 243*     CBC  Recent Labs   Lab 12/14/18  0512 12/13/18  0632   HGB 9.4* 9.5*     COAGNo lab results found in last 7 days.      Tacrolimus level today       ASSESSMENT/PLAN:  Mr. Nicholson is a 63 year old male with a history of heart transplant 6/14/2018 as well as ESRD on hemodialysis listed for kidney transplant. His post-transplant course was complicated by bacteremia, diverticulitis, bowel perforation requiring bowel resection and colostomy in August 2018. He has been admitted after colostomy takedown and diverting loop ileostomy. The heart failure service has been consulted for heart transplant immunosuppression management post-operatively.      S/p heart transplant 6/14/2018:  - no history of rejection  - home dose immunosuppression: tacrolimus 2mg BID, CellCept 1000 BID, prednisone 5mg  - tacrolimus level today up to 18.4 (goal 6-8) despite reduced dose  - will hold tacrolimus for now and repeat level tomorrow morning  - if not tolerating PO can switch medications to IV formulation  - was planned to stop prednisone after last negative biopsy on 12/3 but continued for now due to unstable tacrolimus level stable     We will continue to follow along.        Staffed with Dr. Micha Sharpe MD  Advanced Heart Failure Fellow  [phone]    I have reviewed today's vital signs, notes, medications, labs and imaging.  I have also seen and examined the patient and agree with the findings and  plan as outlined above.  Pt without new complaints.  VSS with exam and labs as outlined above.  Assessment: Pt with OHT on IS with elevated tac level.  Plan to hold dose tonight and tomorrow am and check trough level tomorrow.     Bobby Muse MD, PhD  Professor, Heart Failure and Cardiac Transplantation  [facility]

## 2018-12-14 NOTE — PROGRESS NOTES
12/14/18 1100   Quick Adds   Type of Visit Initial Occupational Therapy Evaluation   Living Environment   Lives With alone   Living Arrangements apartment   Home Accessibility stairs to enter home   Living Environment Comment Pt lives on second floor in apartment, 2 flights (~28 stairs) to enter building. Pt with tub shower.   Self-Care   Usual Activity Tolerance moderate   Current Activity Tolerance fair   Equipment Currently Used at Home none   Activity/Exercise/Self-Care Comment Pt does LE exercises/stretching IND at home, pt uses stairs multiple times a day at home.   Functional Level   Ambulation 0-->independent   Transferring 0-->independent   Toileting 0-->independent   Bathing 0-->independent   Dressing 0-->independent   Eating 0-->independent   Communication 0-->understands/communicates without difficulty   Swallowing 0-->swallows foods/liquids without difficulty   Cognition 0 - no cognition issues reported   Fall history within last six months yes   Number of times patient has fallen within last six months 2   Which of the above functional risks had a recent onset or change? ambulation;transferring   Prior Functional Level Comment Pt reports IND in all activity at baseline. Pt reports two falls in the last 6 months, one at home on the stairs due to dizziness and the other at a TCU.   General Information   Onset of Illness/Injury or Date of Surgery - Date 12/11/18   Referring Physician Rick Tran MD   Patient/Family Goals Statement To return home   Additional Occupational Profile Info/Pertinent History of Current Problem 64 YO M with w/ hx heart transplant and perforated diverticulitis who underwent a hand-assisted laparoscopic sigmoidectomy with end-colostomy and small bowel resection for fistula in August 2018, now POD3 s/p lap colostomy takedown, PATRICIA, flex sig and DLI (12/11). He is recovering appropriately, awaiting ROBF.   Precautions/Limitations fall precautions   Weight-Bearing Status  - LUE other (see comments)  (10 pounds)   Weight-Bearing Status - RUE other (see comments)  (10 pounds)   Weight-Bearing Status - LLE full weight-bearing   Weight-Bearing Status - RLE full weight-bearing   General Observations Pt supine in bed on approach   General Info Comments Ambulate with assist   Cognitive Status Examination   Orientation orientation to person, place and time   Level of Consciousness alert   Visual Perception   Visual Perception No deficits were identified  (Pt reports that he feels vision is improving)   Sensory Examination   Sensory Quick Adds No deficits were identified   Pain Assessment   Patient Currently in Pain Yes, see Vital Sign flowsheet   Integumentary/Edema   Integumentary/Edema no deficits were identifed   Posture   Posture forward head position;protracted shoulders   Range of Motion (ROM)   ROM Quick Adds No deficits were identified   ROM Comment WFL   Strength   Manual Muscle Testing Quick Adds No deficits were identified   Strength Comments WFL   Coordination   Upper Extremity Coordination No deficits were identified   Gross Motor Coordination No deficits identified    Transfer Skill: Bed to Chair/Chair to Bed   Level of [place]: Bed to Chair contact guard   Transfer Skill: Sit to Stand   Level of [place]: Sit/Stand stand-by assist   Transfer Skill: Toilet Transfer   Level of [place]: Toilet contact guard   Grooming   Level of [place]: Grooming stand-by assist   Eating/Self Feeding   Level of [place]: Eating independent   Instrumental Activities of Daily Living (IADL)   Previous Responsibilities meal prep;housekeeping;laundry;shopping;medication management;driving   IADL Comments Pt IND in all I/ADLs. Pt has assist if necessary   Activities of Daily Living Analysis   Impairments Contributing to Impaired Activities of Daily Living strength decreased   General Therapy Interventions   Planned Therapy Interventions ADL retraining;home program  "guidelines;strengthening;progressive activity/exercise   Clinical Impression   Criteria for Skilled Therapeutic Interventions Met yes, treatment indicated   OT Diagnosis Decreased IND in I/ADLs   Influenced by the following impairments deconditioning, pain, decreased strength   Assessment of Occupational Performance 1-3 Performance Deficits   Identified Performance Deficits Tub transfer, household management   Clinical Decision Making (Complexity) Low complexity   Therapy Frequency daily   Predicted Duration of Therapy Intervention (days/wks) 2 weeks   Anticipated Discharge Disposition Transitional Care Facility   Risks and Benefits of Treatment have been explained. Yes   Patient, Family & other staff in agreement with plan of care Yes   [facility] AM-PAC  \"6 Clicks\" Daily Activity Inpatient Short Form   1. Putting on and taking off regular lower body clothing? 3 - A Little   2. Bathing (including washing, rinsing, drying)? 3 - A Little   3. Toileting, which includes using toilet, bedpan or urinal? 3 - A Little   4. Putting on and taking off regular upper body clothing? 4 - None   5. Taking care of personal grooming such as brushing teeth? 4 - None   6. Eating meals? 4 - None   Daily Activity Raw Score (Score out of 24.Lower scores equate to lower levels of function) 21   Total Evaluation Time   Total Evaluation Time (Minutes) 4     "

## 2018-12-14 NOTE — PLAN OF CARE
6C - IND with out of room mobilization per nursing discretion    Discharge Planner PT   Patient plan for discharge: Home  Current status: PT evaluation acknowledged and completed. Initiated supine<>sitting at EOB with HOB elevated, performed via log roll technique IND. Engaged pt in sit <> stand transfer without use of UE IND with therapist assistance for management of lines and IV pole. Progressed to performing stairs x14 steps using a single railing (R ascending) and reciprocal pattern at MOD I. Assessed balance via COREY balance scale with a score of 54/56, indicating not at an increased risk of falls and well above the cut off line. Pt ambulated >500ft throughout session at SBA progressing to IND and did not require rest breaks - vitals stable. Overall pt demonstrates good insight and safety with judgement involving movement. Pt does not require continued in-patient PT services due to pt mobilizing at baseline, and pt is most limited by abdominal pain rather than physical impairments. Thank you for your referral.  Barriers to return to prior living situation: medical condition, lives alone, home setup  Recommendations for discharge: Home  Rationale for recommendations: Pt is recommended to discharge to home with outpatient PT services to establish a HEP in order to continue progressing tolerance to physical activity, endurance and cardiac rehab.        Entered by: Kala Coe 12/14/2018 4:43 PM     Will complete orders at this time. Thank you for your referral.     Physical Therapy Discharge Summary    Reason for therapy discharge:    All goals and outcomes met, no further needs identified.    Progress towards therapy goal(s). See goals on Care Plan in [facility] electronic health record for goal details.  Goals met    Therapy recommendation(s):    Continue home exercise program.

## 2018-12-14 NOTE — PLAN OF CARE
D: Pt s/p takedown of colostomy with new ileostomy placement 12/11. Hx heart txp 6/2018.  I: Monitored vitals and assessed pt status. Dilaudid PCA. LR @ 50mL/hr.  A: A0x4. VSS on 2LPM overnight with occasional, brief desats to 70s-80s. SR on tele. Pt reports incisional pain is well controlled with PCA. Abd incision WNL. Ileostomy bag intact. Pt declined to be woken overnight for assessments (VS, BG checks). Up ind in room. Pt appeared to sleep comfortably between cares, making needs known.  P: Continue to monitor and report changes/concerns to Colorectal Surgery.

## 2018-12-14 NOTE — PROGRESS NOTES
"  WO Nurse Inpatient [facility]   WO Nurse Inpatient Adult     Follow up Assessment   Assessment of new loop Ileostomy Stoma complication(s) none   Mucocutaneous junction; Intact   Peristomal complication(s) intact  Pouch wear time:24-48 hours  Following today's visit:Patient / Patient  is  able to demonstrate;.       1. How to empty their pouch? yes      2. How to change their pouch?  yes      3. How to read and record intake and output correctly? Did not have patient demonstrate today, however, did discuss importance.     Objective data:  Patient history according to medical record: 62 YO M with w/ hx heart transplant and perforated diverticulitis who underwent a hand-assisted laparoscopic sigmoidectomy with end-colostomy and small bowel resection for fistula in August 2018, s/p lap colostomy (12/11/18), PATRICIA, flex sig and DLI (12/11).     Psychosocial:  Pt previously independent with colostomy pouching  Using Coloplast 1 piece flat cut to fit pouches with ostomy paste    Current Diet/Nutrition: Orders Placed This Encounter      Low Fiber Diet     TPN no   I/O last 3 completed shifts:  In: 1609.17 [P.O.:360; I.V.:1249.17]  Out: 205 [Urine:205]  Labs:    Recent Labs   Lab 12/14/18  0512   HGB 9.4*        Physical Exam:  Current pouching system:Shashi one piece fecal pouch with barrier ring  Reason for pouch change today: Education, pouch change return demonstration  Stoma appearance: viable, healthy, pink-red, moist, edematous and protruberant  Stoma size; 44mm (1 3/4\"), irregular shape due to loop stoma, os pointing down at 5 o'clock.  Peristomal skin: intact. Significant crease at 9 o'clock  Stoma output : brown watery stool   Abdominal  Assessment  firm , NG still in place? No  Surgical Site: open to air with skin bond cement  Pain: Sharp and Stabbing with ambulation, minimal at rest  Is patient still on a PCA Yes, plan for discontinuation today per MD    Interventions:  Patient's chart " evaluated.  Focus of today's visit: verbal instruction  and diet and hydration  fitting of appliance, pouch change demonstration and return demonstration, how to use moldable barrier ring, importance of measuring output, differences between colostomy (which patient had prior) and ileostomy, lifestyle adjustments.    Samples ordered, supply list completed  Participant of teaching session today patient and friend  Orders: Reviewed  Change made with ostomy management today: yes  Patient/family: Alert  Supplies:at bedside    Plan:  Learning needs: refitting of appliance (will likely need soft convexity) and discharge instructions   Preparation for discharge: Started  Recommend home care? yes and by home WOC nurse if possible    Discussed plan of care with Patient   Nursing to notify the Provider(s) and re-consult the WOC Nurse if new ostomy concerns or discharge planned before next planned WOC visit.    WOC Nurse will return: Monday  Face to face time: 60 minutes     Eliana Clay RN, BSN, CWON

## 2018-12-14 NOTE — PROGRESS NOTES
Care Coordinator Progress Note    Admission Date/Time:  12/11/2018  Attending MD:  Rick Tran    Data  Chart reviewed, discussed with interdisciplinary team.   Patient s/p lap colostomy takedown, PATRICIA, flex sig and DLI on 12/11/18.    Assessment  Concerns with insurance coverage for discharge needs: None.  Current Living Situation: Patient lives alone.  Support System: Supportive and involved  Services Involved: [facility]- T/Th/Sat (Ph: [phone] Fax: [phone])  Transportation at Discharge: Family or friend will provide  Barriers to Discharge: Post op recovery    Coordination of Care and Referrals: Provided patient/family with options for home WOC RN. WOC RN recommending home WOC nurse visits upon discharge for continued education on new ileostomy. Prior to admission, pt states he was independent with his cares and had been managing his colostomy independently. Pt in agreement with home nursing visits initially upon discharge and would like referral made to [facility].  PT evaluation pending.     Intervention  Arrangements made with [facility] (Ph: [phone]) for RN evaluation post hospitalization. Assess vital signs, respiratory and cardiac status, activity tolerance, hydration, nutritional status, med setup and management. WOC nurse consult for continued ostomy education and supplies.     Plan  Anticipated Discharge Date: 12/17/2018  Anticipated Discharge Plan: Discharge to home with home care  Kavitha Bates RN BSN  6C Unit Care Coordinator  Phone number: [phone]  Pager: [phone]

## 2018-12-14 NOTE — DISCHARGE INSTRUCTIONS
"[facility]     Name: Murray Nicholson : 1955  Date: 2018    Pouching plan of care:  -To use current [place] flat 1 piece cut to fit with barrier ring at cut opening and to fill in crease.    -trial coloplast and odilia samples of convex light and soft convex pouches. Use both with barrier ring  -Order the pouch that provides the best secure fit.    To order your ostomy supplies    The ostomy Supplier needs this supply list  to process your order. You will need to fax/deliver this list, along with your Insurance information. Your home care nurse can assist with this process.             List of Ostomy Distributors        [facility]. [phone] ext-[phone] Fax # [phone]  Or Call your insurance provider for their preferred supplier    Your Medical Supplier will need your surgeon's name, phone and fax number    Clinic:                     Phone                            Fax  Colorectal Surgery:    [phone]    Verbal Order for ostomy supplies for 1 Month per: Eliana Clay RN, BSN, CWON    Authorizing MD: Dr. Tran    Change pouch: Three times a week  Quantity of pouches- 20 pouches/month    Request the following supplies:                     Coloplast       1 piece convex light [place] cut up to 1    with filter #[id number]   Or  M[place]ppa         1 piece soft convex 2 \" #00337      Accessories  2  barrier ring #[id number]     Adapt paste #27609     Adapt powder #7906    No sting film barrier # [id number]                          Brava elastic barrier strips curved # [id number]                                       Change your pouch twice a week, more often if leaking.    If you are cutting a hole in the wafer of your pouch, recheck stoma size and adjust pouch opening as needed every week    . Call the Ostomy Nurse at [facility]       [address] [place] [address] : [phone]   Schedule a follow-up visit in 4 to 6 weeks after your surgery, " sooner if having problems Bring a complete set of pouch-changing supplies to this visit      Problems you should Report  - The stoma turns blue or darker in color.  - Cuts or sores around the stoma.  - Red, raw or painful skin around the stoma.  - Any bulging of the skin around the stoma.  - A pouch that leaks every day.  - Problems making the right size hole in the pouch wafer.    Please call with any questions or concerns.

## 2018-12-14 NOTE — PLAN OF CARE
Pt A/O. See flowsheets for VS. NSR/ST. Pt was on RA; however, sats noted to dip to 80s this evening (~84%), possibly sleeping, pt placed on 2L O2. Pt did not want continuous pulse ox while awake (colorectal updated/aware), continuous pulse ox on while sleeping. Pain managed with scheduled Tylenol + PCA pump (see MAR). LR continues @ 50ml/hr. BG assessed, no sliding scale insulin given. Illeostomy in place. PRN Compazine given for nausea. Low fiber diet. Bowel sounds hypoactive/possibly not audible - colorectal notified/aware. Pt completed HD today on previous shift. Continue with plan of care and report changes to treatment team.

## 2018-12-14 NOTE — PROGRESS NOTES
CLINICAL NUTRITION SERVICES    Team initially ordered Ensure Clear between meals post-op. Per traypasser, pt dislikes the clear liquid oral supplement (Boost Breeze).     INTERVENTIONS:  Implementation:  Medical food supplement therapy: Discontinued Ensure Clear oral supplement order. Placed order to offer snack/supplements with meals, especially chocolate Boost.     Follow up/Monitoring:  Per protocol.    Mary Silva, MS, RD, LD, [facility]   6C Pgr: [phone]

## 2018-12-15 LAB
ALBUMIN SERPL-MCNC: 2.6 G/DL (ref 3.4–5)
ALP SERPL-CCNC: 182 U/L (ref 40–150)
ALT SERPL W P-5'-P-CCNC: 12 U/L (ref 0–70)
ANION GAP SERPL CALCULATED.3IONS-SCNC: 12 MMOL/L (ref 3–14)
AST SERPL W P-5'-P-CCNC: 12 U/L (ref 0–45)
BILIRUB DIRECT SERPL-MCNC: 0.1 MG/DL (ref 0–0.2)
BILIRUB SERPL-MCNC: 0.4 MG/DL (ref 0.2–1.3)
BUN SERPL-MCNC: 22 MG/DL (ref 7–30)
CALCIUM SERPL-MCNC: 8.6 MG/DL (ref 8.5–10.1)
CHLORIDE SERPL-SCNC: 94 MMOL/L (ref 94–109)
CO2 SERPL-SCNC: 28 MMOL/L (ref 20–32)
CREAT SERPL-MCNC: 5.48 MG/DL (ref 0.66–1.25)
GFR SERPL CREATININE-BSD FRML MDRD: 11 ML/MIN/1.7M2
GLUCOSE BLDC GLUCOMTR-MCNC: 132 MG/DL (ref 70–99)
GLUCOSE BLDC GLUCOMTR-MCNC: 154 MG/DL (ref 70–99)
GLUCOSE BLDC GLUCOMTR-MCNC: 161 MG/DL (ref 70–99)
GLUCOSE BLDC GLUCOMTR-MCNC: 168 MG/DL (ref 70–99)
GLUCOSE SERPL-MCNC: 110 MG/DL (ref 70–99)
MAGNESIUM SERPL-MCNC: 1.7 MG/DL (ref 1.6–2.3)
PHOSPHATE SERPL-MCNC: 3.6 MG/DL (ref 2.5–4.5)
PLATELET # BLD AUTO: 186 10E9/L (ref 150–450)
POTASSIUM SERPL-SCNC: 3.4 MMOL/L (ref 3.4–5.3)
PROT SERPL-MCNC: 5.8 G/DL (ref 6.8–8.8)
SODIUM SERPL-SCNC: 133 MMOL/L (ref 133–144)
TACROLIMUS BLD-MCNC: 12.6 UG/L (ref 5–15)
TME LAST DOSE: NORMAL H

## 2018-12-15 PROCEDURE — 25000132 ZZH RX MED GY IP 250 OP 250 PS 637: Mod: GY | Performed by: COLON & RECTAL SURGERY

## 2018-12-15 PROCEDURE — 85049 AUTOMATED PLATELET COUNT: CPT | Performed by: COLON & RECTAL SURGERY

## 2018-12-15 PROCEDURE — 25000131 ZZH RX MED GY IP 250 OP 636 PS 637: Mod: GY | Performed by: INTERNAL MEDICINE

## 2018-12-15 PROCEDURE — 90937 HEMODIALYSIS REPEATED EVAL: CPT

## 2018-12-15 PROCEDURE — 83735 ASSAY OF MAGNESIUM: CPT | Performed by: INTERNAL MEDICINE

## 2018-12-15 PROCEDURE — 63400005 ZZH RX 634: Performed by: COLON & RECTAL SURGERY

## 2018-12-15 PROCEDURE — 80197 ASSAY OF TACROLIMUS: CPT | Performed by: INTERNAL MEDICINE

## 2018-12-15 PROCEDURE — 25000125 ZZHC RX 250: Performed by: COLON & RECTAL SURGERY

## 2018-12-15 PROCEDURE — 25000131 ZZH RX MED GY IP 250 OP 636 PS 637: Mod: GY | Performed by: COLON & RECTAL SURGERY

## 2018-12-15 PROCEDURE — 21400006 ZZH R&B CCU INTERMEDIATE UMMC

## 2018-12-15 PROCEDURE — 40000141 ZZH STATISTIC PERIPHERAL IV START W/O US GUIDANCE

## 2018-12-15 PROCEDURE — 84100 ASSAY OF PHOSPHORUS: CPT | Performed by: INTERNAL MEDICINE

## 2018-12-15 PROCEDURE — 80048 BASIC METABOLIC PNL TOTAL CA: CPT | Performed by: INTERNAL MEDICINE

## 2018-12-15 PROCEDURE — 80076 HEPATIC FUNCTION PANEL: CPT | Performed by: INTERNAL MEDICINE

## 2018-12-15 PROCEDURE — 00000146 ZZHCL STATISTIC GLUCOSE BY METER IP

## 2018-12-15 PROCEDURE — 40000802 ZZH SITE CHECK

## 2018-12-15 PROCEDURE — 25000128 H RX IP 250 OP 636: Performed by: COLON & RECTAL SURGERY

## 2018-12-15 PROCEDURE — A9270 NON-COVERED ITEM OR SERVICE: HCPCS | Mod: GY | Performed by: COLON & RECTAL SURGERY

## 2018-12-15 RX ORDER — TACROLIMUS 1 MG/1
1 CAPSULE ORAL
Status: DISCONTINUED | OUTPATIENT
Start: 2018-12-15 | End: 2018-12-17 | Stop reason: HOSPADM

## 2018-12-15 RX ADMIN — TACROLIMUS 1 MG: 1 CAPSULE ORAL at 20:12

## 2018-12-15 RX ADMIN — Medication 5000 UNITS: at 12:32

## 2018-12-15 RX ADMIN — Medication 5000 UNITS: at 23:53

## 2018-12-15 RX ADMIN — TRAMADOL HYDROCHLORIDE 100 MG: 50 TABLET, COATED ORAL at 11:46

## 2018-12-15 RX ADMIN — CLONIDINE HYDROCHLORIDE 0.1 MG: 0.1 TABLET ORAL at 22:15

## 2018-12-15 RX ADMIN — LISINOPRIL 5 MG: 5 TABLET ORAL at 12:31

## 2018-12-15 RX ADMIN — PREDNISONE 5 MG: 5 TABLET ORAL at 07:49

## 2018-12-15 RX ADMIN — TRAMADOL HYDROCHLORIDE 100 MG: 50 TABLET, COATED ORAL at 23:54

## 2018-12-15 RX ADMIN — Medication 25000 UNITS: at 20:10

## 2018-12-15 RX ADMIN — Medication: at 08:29

## 2018-12-15 RX ADMIN — MYCOPHENOLATE MOFETIL 1000 MG: 250 CAPSULE ORAL at 07:48

## 2018-12-15 RX ADMIN — CHLORHEXIDINE GLUCONATE 0.12% ORAL RINSE 15 ML: 1.2 LIQUID ORAL at 20:47

## 2018-12-15 RX ADMIN — HEPARIN SODIUM 3000 UNITS: 1000 INJECTION, SOLUTION INTRAVENOUS; SUBCUTANEOUS at 11:46

## 2018-12-15 RX ADMIN — CLOTRIMAZOLE 1 TROCHE: 10 LOZENGE ORAL at 07:48

## 2018-12-15 RX ADMIN — PROCHLORPERAZINE EDISYLATE 10 MG: 5 INJECTION INTRAMUSCULAR; INTRAVENOUS at 07:47

## 2018-12-15 RX ADMIN — HYDRALAZINE HYDROCHLORIDE 100 MG: 100 TABLET ORAL at 20:11

## 2018-12-15 RX ADMIN — MYCOPHENOLATE MOFETIL 1000 MG: 250 CAPSULE ORAL at 20:12

## 2018-12-15 RX ADMIN — SODIUM CHLORIDE 300 ML: 9 INJECTION, SOLUTION INTRAVENOUS at 08:24

## 2018-12-15 RX ADMIN — AMLODIPINE BESYLATE 10 MG: 10 TABLET ORAL at 12:42

## 2018-12-15 RX ADMIN — CLOTRIMAZOLE 1 TROCHE: 10 LOZENGE ORAL at 20:10

## 2018-12-15 RX ADMIN — CHLORHEXIDINE GLUCONATE 0.12% ORAL RINSE 15 ML: 1.2 LIQUID ORAL at 07:48

## 2018-12-15 RX ADMIN — FLUTICASONE PROPIONATE 2 SPRAY: 50 SPRAY, METERED NASAL at 20:47

## 2018-12-15 RX ADMIN — SULFAMETHOXAZOLE AND TRIMETHOPRIM 1 TABLET: 400; 80 TABLET ORAL at 20:12

## 2018-12-15 RX ADMIN — ACETAMINOPHEN 1000 MG: 500 TABLET, FILM COATED ORAL at 20:10

## 2018-12-15 RX ADMIN — INSULIN ASPART 2 UNITS: 100 INJECTION, SOLUTION INTRAVENOUS; SUBCUTANEOUS at 14:33

## 2018-12-15 RX ADMIN — Medication 25000 UNITS: at 07:48

## 2018-12-15 RX ADMIN — HYDRALAZINE HYDROCHLORIDE 100 MG: 100 TABLET ORAL at 12:32

## 2018-12-15 RX ADMIN — ACETAMINOPHEN 1000 MG: 500 TABLET, FILM COATED ORAL at 12:31

## 2018-12-15 RX ADMIN — EPOETIN ALFA 8000 UNITS: 10000 SOLUTION INTRAVENOUS; SUBCUTANEOUS at 10:41

## 2018-12-15 RX ADMIN — SODIUM CHLORIDE 250 ML: 9 INJECTION, SOLUTION INTRAVENOUS at 08:25

## 2018-12-15 ASSESSMENT — ACTIVITIES OF DAILY LIVING (ADL)
ADLS_ACUITY_SCORE: 15

## 2018-12-15 ASSESSMENT — PAIN DESCRIPTION - DESCRIPTORS
DESCRIPTORS: ACHING
DESCRIPTORS: ACHING;DULL
DESCRIPTORS: ACHING

## 2018-12-15 ASSESSMENT — MIFFLIN-ST. JEOR: SCORE: 1570.38

## 2018-12-15 NOTE — PROGRESS NOTES
HEMODIALYSIS TREATMENT NOTE    Date: 12/15/2018  Time: 2:39 PM    Data:  Pre Wt:   78.5 kg  Desired Wt: 76.5 kg   Post Wt:76.9 kg  Weight gain: 1.6  kg off  Weight change: 1.6  kg  Ultrafiltration - Post Run Net Total Removed (mL): 1500 mL  Ultrafiltration - Post Run Net Total Gain (mL): 0 mL  Vascular Access Status: Yes, secured and visible  Dialyzer Rinse: Streaked, Light  Total Blood Volume Processed: 75.7 L  Total Dialysis (Treatment) Time:  3 hrs 30 mins    Lab:   YES :BMP and Tacrolimus level.     Assessment:  Patent Rt Tunnel CVC line. EDW 76.5 kg. Pre run Wt: 78.5 kg    Interventions:  Initiated HD via Rt CVC HD line. Reach BFR to 400 ml/mins. Started with K 3/ 3 bath but changed to K4/3 bath for K level 3.4. Pt requested to 1.5 kg off insread 2 kg off. OK with MD for 1.5 kg off. Tolerable and stable for 3.5 hrs run / 1.5 kg  Off. Checked B/S before meal: 132. Finished HD with rinse back. CVC locked with 1:1000 units and clearguard placed. Gave Tramadol 100 mg PO for pain.     Plan:    Next run per renal team.

## 2018-12-15 NOTE — PLAN OF CARE
OT/6C: Cancel--pt at dialysis this AM, on re-approach this PM pt busy with alternate provider and requesting to eat lunch. Will reschedule forward per POC.

## 2018-12-15 NOTE — PROGRESS NOTES
Nephrology dialysis note    This patient was seen and examined while on dialysis. Laboratory results and nurses' notes were reviewed.    No changes to management of volume, anemia, BMD, acidosis, or electrolytes.    Diagnosis - ESRD POD4 s/p lap colostomy takedown, PATRICIA, flex sig and DLI (12/11). Plan for next HD Tuesday per TTS schedule.    Brice Caraballo MD  [phone]

## 2018-12-15 NOTE — PLAN OF CARE
D/A/I:  Patient POD 3 after ileostomy, A&O x4.  Denied palpitations, difficulty breathing, SOB, and dizziness.  Abdominal incisions clean, dry, and intact with no drainage noted.  Bowel sounds hyperactive.  Ileostomy pink and moist as seen through ileostomy bag, patient had small amount of loose brown stool during shift.  Patient also changed ileostomy bag.  Had two episodes of nausea during shift that resolved with prn compazine.  Lung sounds clear to auscultation, no abnormal heart sounds noted.  In sinus rhythm with intermittent tachycardia, HR 90s-100s, SBP 130s-150s, SaO2 % on room air.  Reported low abdominal pain that was well managed with scheduled acetaminophen and sporadic use of PCA pump.  PCA pump discontinued during shift with oral analgesics started.  Ambulated in room independently with steady gait.    P:  Continue to monitor pain, VS, heart rhythm, surgical site and ileostomy integrity, fluid status, bowel status, cardiac and respiratory status.  Notify care team of changes in patient condition or other concerns.

## 2018-12-15 NOTE — PROGRESS NOTES
Colorectal Surgery Progress Note  POD#4      Subjective: No acute events. Pain well controlled. Tolerating LRD.     Vitals:  Vitals:    12/14/18 1553 12/14/18 1940 12/14/18 2312 12/15/18 0600   BP: 137/85 141/88 133/86    BP Location: Right arm Right arm Right arm    Cuff Size: Adult Regular      Pulse:       Resp: 18 18 16    Temp: 98.6  F (37  C) 98.4  F (36.9  C) 98.4  F (36.9  C)    TempSrc: Oral Oral Oral    SpO2: 100% 100%     Weight:    78.5 kg (173 lb 1 oz)   Height:         I/O:  I/O last 3 completed shifts:  In: 2440.17 [P.O.:716; I.V.:1724.17]  Out: 279 [Urine:275; Emesis/NG output:4]    Physical Exam:  Gen: AAOx3, NAD  Pulm: Non-labored breathing   Abd: Soft, non-distended, appropriately tender, incisions c/d/i Ileostomy pink w/ good output  Ext:  Warm and well-perfused    BMP  Recent Labs   Lab 12/14/18  0512 12/13/18  0632 12/12/18  1618 12/12/18  0612 12/11/18  1153   * 132*  --  133 136   POTASSIUM 3.5 4.1  --  3.8 3.7   CHLORIDE 93* 99  --  97 102   CO2 28 22  --  24 23   BUN 14 27  --  23 16   CR 4.37* 6.66*  --  5.82* 4.43*   * 104*  --  112* 117*   MAG 1.8 2.3 2.5* 1.3* 1.2*   PHOS 3.4 4.2  --  3.4  --      CBC  Recent Labs   Lab 12/14/18  0512 12/13/18  0632 12/12/18  0612 12/11/18  1153   HGB 9.4* 9.5* 9.9* 9.2*         ASSESSMENT: 64 YO M with w/ hx heart transplant and perforated diverticulitis who underwent a hand-assisted laparoscopic sigmoidectomy with end-colostomy and small bowel resection for fistula in August 2018, now POD4 s/p lap colostomy takedown, PATRICIA, flex sig and DLI (12/11).    -Neuro: tylenol krissy, dilaudid prn, tramadol prn melatonin  -CV:  -Heart failure service consulted for hx of heart transplant     -Immunosuppression with predisone, tacrolimus, mycophenolate    -Continue PTA amlodipine, clonidine, hydralazine, lisinopril  -Resp: Flonase  -FEN/GI: LRD  -Endo: High dose sliding scale insulin  -Renal: On HD TThSa, Nephrology following. Appreciate recs  -ID:  Finished Periop cipro/flagyl  -Ppx: HSQ TID, PPI, bactrim  Dispo: Plan home Monday if immunosuppression stable      Discussed with staff.    Isma Sood MD  Colorectal Surgery Fellow      Attestation:  This patient has been seen and evaluated by me, Shani Leo.  Discussed with the house staff team or resident(s) and agree with the findings and plan in this note.    Shani Leo MD  Colon and Rectal Surgery Attending  [phone]

## 2018-12-15 NOTE — PLAN OF CARE
Vital signs stable. Patient requested Tramadol last evening at 2000 for abdominal pain. Patient resting quietly after getting pain med. Glucose was 222 at bedtime, patient got 1 unit insulin per sliding scale. Patient wanted to rest undisturbed overnight and refused 0200 glucose check. Patient voiding small amounts brian urine via bedside urinal. Ileostomy intact. Plan hemodialysis today at 0800. Monitor comfort, lab results and fluid balance.

## 2018-12-16 LAB
ANION GAP SERPL CALCULATED.3IONS-SCNC: 9 MMOL/L (ref 3–14)
BUN SERPL-MCNC: 14 MG/DL (ref 7–30)
CALCIUM SERPL-MCNC: 9.2 MG/DL (ref 8.5–10.1)
CHLORIDE SERPL-SCNC: 97 MMOL/L (ref 94–109)
CO2 SERPL-SCNC: 27 MMOL/L (ref 20–32)
CREAT SERPL-MCNC: 4.12 MG/DL (ref 0.66–1.25)
GFR SERPL CREATININE-BSD FRML MDRD: 15 ML/MIN/1.7M2
GLUCOSE BLDC GLUCOMTR-MCNC: 101 MG/DL (ref 70–99)
GLUCOSE BLDC GLUCOMTR-MCNC: 138 MG/DL (ref 70–99)
GLUCOSE BLDC GLUCOMTR-MCNC: 221 MG/DL (ref 70–99)
GLUCOSE SERPL-MCNC: 86 MG/DL (ref 70–99)
MAGNESIUM SERPL-MCNC: 1.7 MG/DL (ref 1.6–2.3)
PHOSPHATE SERPL-MCNC: 3.1 MG/DL (ref 2.5–4.5)
POTASSIUM SERPL-SCNC: 4.2 MMOL/L (ref 3.4–5.3)
SODIUM SERPL-SCNC: 133 MMOL/L (ref 133–144)
TACROLIMUS BLD-MCNC: 10.5 UG/L (ref 5–15)
TME LAST DOSE: NORMAL H

## 2018-12-16 PROCEDURE — 25000132 ZZH RX MED GY IP 250 OP 250 PS 637: Mod: GY | Performed by: COLON & RECTAL SURGERY

## 2018-12-16 PROCEDURE — 25000128 H RX IP 250 OP 636: Performed by: COLON & RECTAL SURGERY

## 2018-12-16 PROCEDURE — 21400006 ZZH R&B CCU INTERMEDIATE UMMC

## 2018-12-16 PROCEDURE — 25000131 ZZH RX MED GY IP 250 OP 636 PS 637: Mod: GY | Performed by: COLON & RECTAL SURGERY

## 2018-12-16 PROCEDURE — 83735 ASSAY OF MAGNESIUM: CPT | Performed by: COLON & RECTAL SURGERY

## 2018-12-16 PROCEDURE — 80197 ASSAY OF TACROLIMUS: CPT | Performed by: COLON & RECTAL SURGERY

## 2018-12-16 PROCEDURE — 84100 ASSAY OF PHOSPHORUS: CPT | Performed by: COLON & RECTAL SURGERY

## 2018-12-16 PROCEDURE — 36415 COLL VENOUS BLD VENIPUNCTURE: CPT | Performed by: COLON & RECTAL SURGERY

## 2018-12-16 PROCEDURE — 80048 BASIC METABOLIC PNL TOTAL CA: CPT | Performed by: COLON & RECTAL SURGERY

## 2018-12-16 PROCEDURE — A9270 NON-COVERED ITEM OR SERVICE: HCPCS | Mod: GY | Performed by: COLON & RECTAL SURGERY

## 2018-12-16 PROCEDURE — 99232 SBSQ HOSP IP/OBS MODERATE 35: CPT | Mod: GC | Performed by: INTERNAL MEDICINE

## 2018-12-16 PROCEDURE — 25000125 ZZHC RX 250: Performed by: COLON & RECTAL SURGERY

## 2018-12-16 PROCEDURE — 25000131 ZZH RX MED GY IP 250 OP 636 PS 637: Mod: GY | Performed by: INTERNAL MEDICINE

## 2018-12-16 PROCEDURE — 00000146 ZZHCL STATISTIC GLUCOSE BY METER IP

## 2018-12-16 RX ADMIN — HYDRALAZINE HYDROCHLORIDE 100 MG: 100 TABLET ORAL at 23:31

## 2018-12-16 RX ADMIN — CHLORHEXIDINE GLUCONATE 0.12% ORAL RINSE 15 ML: 1.2 LIQUID ORAL at 20:22

## 2018-12-16 RX ADMIN — TACROLIMUS 1 MG: 1 CAPSULE ORAL at 18:13

## 2018-12-16 RX ADMIN — Medication 5000 UNITS: at 23:30

## 2018-12-16 RX ADMIN — TACROLIMUS 1 MG: 1 CAPSULE ORAL at 08:44

## 2018-12-16 RX ADMIN — INSULIN ASPART 4 UNITS: 100 INJECTION, SOLUTION INTRAVENOUS; SUBCUTANEOUS at 19:05

## 2018-12-16 RX ADMIN — CLOTRIMAZOLE 1 TROCHE: 10 LOZENGE ORAL at 20:17

## 2018-12-16 RX ADMIN — HYDRALAZINE HYDROCHLORIDE 100 MG: 100 TABLET ORAL at 16:18

## 2018-12-16 RX ADMIN — Medication 25000 UNITS: at 08:44

## 2018-12-16 RX ADMIN — CHLORHEXIDINE GLUCONATE 0.12% ORAL RINSE 15 ML: 1.2 LIQUID ORAL at 08:44

## 2018-12-16 RX ADMIN — AMLODIPINE BESYLATE 10 MG: 10 TABLET ORAL at 08:44

## 2018-12-16 RX ADMIN — ACETAMINOPHEN 1000 MG: 500 TABLET, FILM COATED ORAL at 12:04

## 2018-12-16 RX ADMIN — ACETAMINOPHEN 1000 MG: 500 TABLET, FILM COATED ORAL at 16:18

## 2018-12-16 RX ADMIN — Medication 25000 UNITS: at 20:17

## 2018-12-16 RX ADMIN — ACETAMINOPHEN 1000 MG: 500 TABLET, FILM COATED ORAL at 20:17

## 2018-12-16 RX ADMIN — TRAMADOL HYDROCHLORIDE 100 MG: 50 TABLET, COATED ORAL at 12:04

## 2018-12-16 RX ADMIN — MYCOPHENOLATE MOFETIL 1000 MG: 250 CAPSULE ORAL at 20:18

## 2018-12-16 RX ADMIN — LISINOPRIL 5 MG: 5 TABLET ORAL at 08:44

## 2018-12-16 RX ADMIN — CLONIDINE HYDROCHLORIDE 0.1 MG: 0.1 TABLET ORAL at 22:12

## 2018-12-16 RX ADMIN — PREDNISONE 5 MG: 5 TABLET ORAL at 08:44

## 2018-12-16 RX ADMIN — TRAMADOL HYDROCHLORIDE 100 MG: 50 TABLET, COATED ORAL at 23:31

## 2018-12-16 RX ADMIN — HYDRALAZINE HYDROCHLORIDE 100 MG: 100 TABLET ORAL at 08:44

## 2018-12-16 RX ADMIN — PROCHLORPERAZINE EDISYLATE 10 MG: 5 INJECTION INTRAMUSCULAR; INTRAVENOUS at 19:19

## 2018-12-16 RX ADMIN — Medication 5000 UNITS: at 12:04

## 2018-12-16 RX ADMIN — MYCOPHENOLATE MOFETIL 1000 MG: 250 CAPSULE ORAL at 08:44

## 2018-12-16 RX ADMIN — FLUTICASONE PROPIONATE 2 SPRAY: 50 SPRAY, METERED NASAL at 22:12

## 2018-12-16 RX ADMIN — ACETAMINOPHEN 1000 MG: 500 TABLET, FILM COATED ORAL at 08:43

## 2018-12-16 RX ADMIN — CLOTRIMAZOLE 1 TROCHE: 10 LOZENGE ORAL at 08:44

## 2018-12-16 ASSESSMENT — ACTIVITIES OF DAILY LIVING (ADL)
ADLS_ACUITY_SCORE: 15

## 2018-12-16 ASSESSMENT — PAIN DESCRIPTION - DESCRIPTORS
DESCRIPTORS: ACHING

## 2018-12-16 ASSESSMENT — MIFFLIN-ST. JEOR: SCORE: 1549.24

## 2018-12-16 NOTE — PROGRESS NOTES
ADVANCED HEART FAILURE PROGRESS NOTE    SUBJECTIVE:  No acute events.    ROS otherwise negative.    OBJECTIVE:  Vital signs:  Temp: 98.3  F (36.8  C) Temp  Min: 98  F (36.7  C)  Max: 98.8  F (37.1  C)  Heart Rate: 98 Heart Rate  Min: 91  Max: 100  BP: 124/82 Systolic (24hrs), Av , Min:124 , Max:155   Diastolic (24hrs), Av, Min:65, Max:95    Resp: 16 Resp  Min: 16  Max: 16  SpO2: 100 % SpO2  Min: 100 %  Max: 100 %      Intake/Output Summary (Last 24 hours) at 2018 1729  Last data filed at 2018 1200  Gross per 24 hour   Intake 600 ml   Output 425 ml   Net 175 ml       Vitals:    18 0748 12/15/18 0600 18 0621   Weight: 77.8 kg (171 lb 8 oz) 78.5 kg (173 lb 1 oz) 76.4 kg (168 lb 6.4 oz)       Physical Exam:  Gen: lying in bed, no distress  HEENT: moist mucosa, PERRL  Resp: clear bilaterally, no r/r/w  CVS: NRRR, no murmurs  Abdo: soft, nondistended  Extremities: warm, no edema  Neuro:awake, alert, oriented    Medications:      Current Facility-Administered Medications   Medication Dose Route Frequency     acetaminophen  1,000 mg Oral 4x Daily     amLODIPine  10 mg Oral Daily     beta carotene  25,000 Units Oral BID     chlorhexidine  15 mL Swish & Spit BID     cloNIDine  0.1 mg Oral At Bedtime     clotrimazole  1 Kalpana Buccal BID     fluticasone  1-2 spray Both Nostrils Daily     heparin  5,000 Units Subcutaneous Q12H     hydrALAZINE  100 mg Oral Q8H     insulin aspart  1-10 Units Subcutaneous TID AC     insulin aspart  1-7 Units Subcutaneous At Bedtime     lisinopril  5 mg Oral Daily     mycophenolate  1,000 mg Oral BID     predniSONE  5 mg Oral Daily     sodium chloride (PF)  3 mL Intracatheter Q8H     sulfamethoxazole-trimethoprim  1 tablet Oral Once per day on  Sat     tacrolimus  1 mg Oral BID IS         Labs:  CMP  Recent Labs   Lab 18  0609 12/15/18  0907    133   POTASSIUM 4.2 3.4   CHLORIDE 97 94   CO2 27 28   BUN 14 22   CR 4.12* 5.48*   TRINI 9.2 8.6   MAG  1.7 1.7   PHOS 3.1 3.6   GLC 86 110*   ALKPHOS  --  182*   BILITOTAL  --  0.4   AST  --  12   ALT  --  12     BLOOD GAS  Recent Labs   Lab 12/11/18  0950   PH 7.42   PCO2 39   PO2 243*     CBC  Recent Labs   Lab 12/15/18  0907 12/14/18  0512 12/13/18  0632   HGB  --  9.4* 9.5*     --   --      COAGNo lab results found in last 7 days.      ASSESSMENT/PLAN:  Mr. Nicholson is a 63 year old male with a history of heart transplant 6/14/2018 as well as ESRD on hemodialysis listed for kidney transplant. His post-transplant course was complicated by bacteremia, diverticulitis, bowel perforation requiring bowel resection and colostomy in August 2018. He has been admitted after colostomy takedown and diverting loop ileostomy. The heart failure service has been consulted for heart transplant immunosuppression management post-operatively.      S/p heart transplant 6/14/2018:  - no history of rejection  - home dose immunosuppression: tacrolimus 2mg BID, CellCept 1000 BID, prednisone 5mg  - tacrolimus level down to 10.5 today (goal 6-8)   - will continue tacrolimus dose at 1mg BID  - will recheck tacro level in the morning and make further adjustment as needed  - ok to discharge Monday from cardiac perspective, will likely need a repeat tacro level as outpatient on Wednesday or Thursday     We will continue to follow along.        Darvin Sharpe MD  Advanced Heart Failure Fellow  [phone]      Late entry - pt seen and examined on 12/16/18  I have reviewed today's vital signs, notes, medications, labs and imaging. I have also seen and examined the patient and agree with the findings and plan as outlined above.    Mavis Shearer MD  Section Head - Advanced Heart Failure, Transplantation and Mechanical Circulatory Support  Co-Director - [facility]  Associate Professor of Medicine, [facility]

## 2018-12-16 NOTE — PLAN OF CARE
Vital signs stable, patient took tramadol at bedtime for abdominal discomfort. Patient complained of PIV discomfort, new PIV [facility] by [facility]. Moderate amount liquid stool emptied from ileostomy. Evening glucose 161, patient refused to get 0200 glucose check so he could sleep undisturbed. Patient on room air during day, wears 2L O2 via nasal cannula when sleeping. Patient gets hemodialysis on Tuesday - Thursday - Saturday. Continue to monitor labs, vitals, comfort.

## 2018-12-16 NOTE — PROGRESS NOTES
Colorectal Surgery Progress Note  POD#5      Subjective: No acute events. Pain well controlled. Tolerating LRD. Had dialysis yesterday. Ileostomy with good output. Afebrile, HDS.     Vitals:  Vitals:    12/15/18 2000 12/15/18 2213 12/16/18 0621 12/16/18 0814   BP: (!) 155/91 (!) 153/95  151/65   BP Location: Right arm Right arm  Right arm   Cuff Size:       Pulse:       Resp: 16 16 16   Temp: 98.8  F (37.1  C)   98  F (36.7  C)   TempSrc: Oral   Oral   SpO2: 100%   100%   Weight:   76.4 kg (168 lb 6.4 oz)    Height:         I/O:  I/O last 3 completed shifts:  In: 356 [P.O.:356]  Out: 1985 [Urine:10; Other:1500; Stool:475]    Physical Exam:  Gen: AAOx3, NAD  Pulm: Non-labored breathing   Abd: Soft, non-distended, appropriately tender, incisions c/d/i Ileostomy pink w/ stool and gas output  Ext:  Warm and well-perfused    BMP  Recent Labs   Lab 12/16/18  0609 12/15/18  0907 12/14/18  0512 12/13/18  0632    133 132* 132*   POTASSIUM 4.2 3.4 3.5 4.1   CHLORIDE 97 94 93* 99   CO2 27 28 28 22   BUN 14 22 14 27   CR 4.12* 5.48* 4.37* 6.66*   GLC 86 110* 126* 104*   MAG 1.7 1.7 1.8 2.3   PHOS 3.1 3.6 3.4 4.2     CBC  Recent Labs   Lab 12/15/18  0907 12/14/18  0512 12/13/18  0632 12/12/18  0612 12/11/18  1153   HGB  --  9.4* 9.5* 9.9* 9.2*     --   --   --   --          ASSESSMENT: 64 YO M with w/ hx heart transplant and perforated diverticulitis who underwent a hand-assisted laparoscopic sigmoidectomy with end-colostomy and small bowel resection for fistula in August 2018, now POD5 s/p lap colostomy takedown, PATRICIA, flex sig and DLI (12/11).    -Neuro: tylenol krissy, dilaudid prn, tramadol prn melatonin  -CV:  -Heart failure service consulted for hx of heart transplant     -Immunosuppression with predisone, tacrolimus, mycophenolate    -Continue PTA amlodipine, clonidine, hydralazine, lisinopril  -Resp: Flonase  -FEN/GI: LRD  -Endo: High dose sliding scale insulin  -Renal: On HD TThSa, Nephrology following.  Appreciate recs  -ID: Finished Periop cipro/flagyl  -Ppx: HSQ TID, PPI, bactrim  Dispo: Plan home Monday if immunosuppression stable      Seen with fellow, Dr. Sood, who discussed with staff.    Nidhi Rahman, PGY1  Colorectal Surgery

## 2018-12-16 NOTE — PLAN OF CARE
D/A/I:  Patient POD 4 after ileostom, A&O x4.  Denied palpitations, difficulty breathing, SOB, and dizziness.  Lung sounds clear to auscultation, no abnormal heart sounds noted.  Reported nausea at beginning of shift that was managed with prn compazine.  Stoma pink and moist, small amounts of loose dark brown stool emptied from the bag.  In sinus rhythm with HR 80s-90s, SBP 130s-150s, SaO2 % on room air.  Reported low abdominal pain that was managed with prn tramadol and scheduled acetaminophen.  Dialysis run performed during shift, see Chart Review for results.  Potassium level was 3.4 and magnesium was 1.7, notified MD team.  They opted to continue to monitor, as patient had dialysis run today.  Patient ambulated in room independently with steady gait.    P:  Continue to monitor pain, VS, heart rhythm, surgical site and ileostomy integrity, fluid status, bowel status, cardiac and respiratory status.  Notify care team of changes in patient condition or other concerns.

## 2018-12-17 ENCOUNTER — TELEPHONE (OUTPATIENT)
Dept: TRANSPLANT | Facility: CLINIC | Age: 63
End: 2018-12-17

## 2018-12-17 ENCOUNTER — APPOINTMENT (OUTPATIENT)
Dept: OCCUPATIONAL THERAPY | Facility: CLINIC | Age: 63
DRG: 329 | End: 2018-12-17
Attending: COLON & RECTAL SURGERY
Payer: MEDICARE

## 2018-12-17 VITALS
BODY MASS INDEX: 24.79 KG/M2 | TEMPERATURE: 98.4 F | SYSTOLIC BLOOD PRESSURE: 131 MMHG | RESPIRATION RATE: 16 BRPM | HEART RATE: 94 BPM | OXYGEN SATURATION: 100 % | HEIGHT: 69 IN | DIASTOLIC BLOOD PRESSURE: 90 MMHG | WEIGHT: 167.4 LBS

## 2018-12-17 DIAGNOSIS — Z94.1 HEART REPLACED BY TRANSPLANT (H): Primary | ICD-10-CM

## 2018-12-17 LAB
ANION GAP SERPL CALCULATED.3IONS-SCNC: 8 MMOL/L (ref 3–14)
BUN SERPL-MCNC: 22 MG/DL (ref 7–30)
CALCIUM SERPL-MCNC: 9.1 MG/DL (ref 8.5–10.1)
CHLORIDE SERPL-SCNC: 97 MMOL/L (ref 94–109)
CO2 SERPL-SCNC: 27 MMOL/L (ref 20–32)
CREAT SERPL-MCNC: 5.55 MG/DL (ref 0.66–1.25)
GFR SERPL CREATININE-BSD FRML MDRD: 10 ML/MIN/1.7M2
GLUCOSE SERPL-MCNC: 88 MG/DL (ref 70–99)
HBV CORE AB SERPL QL IA: NONREACTIVE
HBV SURFACE AB SERPL IA-ACNC: 11.75 M[IU]/ML
HBV SURFACE AG SERPL QL IA: NONREACTIVE
HCV AB SERPL QL IA: NONREACTIVE
HIV 1+2 AB+HIV1 P24 AG SERPL QL IA: NONREACTIVE
MAGNESIUM SERPL-MCNC: 1.7 MG/DL (ref 1.6–2.3)
PHOSPHATE SERPL-MCNC: 3.5 MG/DL (ref 2.5–4.5)
POTASSIUM SERPL-SCNC: 4.2 MMOL/L (ref 3.4–5.3)
SODIUM SERPL-SCNC: 132 MMOL/L (ref 133–144)

## 2018-12-17 PROCEDURE — G0499 HEPB SCREEN HIGH RISK INDIV: HCPCS | Performed by: INTERNAL MEDICINE

## 2018-12-17 PROCEDURE — 87389 HIV-1 AG W/HIV-1&-2 AB AG IA: CPT | Performed by: INTERNAL MEDICINE

## 2018-12-17 PROCEDURE — 36415 COLL VENOUS BLD VENIPUNCTURE: CPT | Performed by: COLON & RECTAL SURGERY

## 2018-12-17 PROCEDURE — 25000125 ZZHC RX 250: Performed by: COLON & RECTAL SURGERY

## 2018-12-17 PROCEDURE — 25000131 ZZH RX MED GY IP 250 OP 636 PS 637: Mod: GY | Performed by: INTERNAL MEDICINE

## 2018-12-17 PROCEDURE — 25000132 ZZH RX MED GY IP 250 OP 250 PS 637: Mod: GY | Performed by: COLON & RECTAL SURGERY

## 2018-12-17 PROCEDURE — 86706 HEP B SURFACE ANTIBODY: CPT | Performed by: INTERNAL MEDICINE

## 2018-12-17 PROCEDURE — 40000903 ZZH STATISTIC WOC PT EDUCATION, 31-45 MIN

## 2018-12-17 PROCEDURE — 25000131 ZZH RX MED GY IP 250 OP 636 PS 637: Mod: GY | Performed by: COLON & RECTAL SURGERY

## 2018-12-17 PROCEDURE — 83735 ASSAY OF MAGNESIUM: CPT | Performed by: COLON & RECTAL SURGERY

## 2018-12-17 PROCEDURE — A9270 NON-COVERED ITEM OR SERVICE: HCPCS | Mod: GY | Performed by: COLON & RECTAL SURGERY

## 2018-12-17 PROCEDURE — 80048 BASIC METABOLIC PNL TOTAL CA: CPT | Performed by: COLON & RECTAL SURGERY

## 2018-12-17 PROCEDURE — G0472 HEP C SCREEN HIGH RISK/OTHER: HCPCS | Performed by: INTERNAL MEDICINE

## 2018-12-17 PROCEDURE — 86704 HEP B CORE ANTIBODY TOTAL: CPT | Performed by: INTERNAL MEDICINE

## 2018-12-17 PROCEDURE — 84100 ASSAY OF PHOSPHORUS: CPT | Performed by: COLON & RECTAL SURGERY

## 2018-12-17 PROCEDURE — 97535 SELF CARE MNGMENT TRAINING: CPT | Mod: GO

## 2018-12-17 PROCEDURE — 36415 COLL VENOUS BLD VENIPUNCTURE: CPT | Performed by: INTERNAL MEDICINE

## 2018-12-17 PROCEDURE — 40000133 ZZH STATISTIC OT WARD VISIT

## 2018-12-17 RX ORDER — TRAMADOL HYDROCHLORIDE 50 MG/1
50-100 TABLET ORAL EVERY 12 HOURS PRN
Qty: 15 TABLET | Refills: 0 | Status: SHIPPED | OUTPATIENT
Start: 2018-12-17 | End: 2019-02-25

## 2018-12-17 RX ORDER — TACROLIMUS 1 MG/1
1 CAPSULE ORAL 2 TIMES DAILY
Qty: 30 CAPSULE | Refills: 0 | Status: SHIPPED | OUTPATIENT
Start: 2018-12-17 | End: 2018-12-28

## 2018-12-17 RX ORDER — ONDANSETRON 4 MG/1
4 TABLET, ORALLY DISINTEGRATING ORAL EVERY 8 HOURS PRN
Qty: 30 TABLET | Refills: 1 | Status: SHIPPED | OUTPATIENT
Start: 2018-12-17 | End: 2019-02-25

## 2018-12-17 RX ORDER — LOPERAMIDE HYDROCHLORIDE 2 MG/1
2 TABLET ORAL 4 TIMES DAILY PRN
Qty: 30 TABLET | Refills: 0 | Status: SHIPPED | OUTPATIENT
Start: 2018-12-17 | End: 2019-03-13

## 2018-12-17 RX ADMIN — ACETAMINOPHEN 1000 MG: 500 TABLET, FILM COATED ORAL at 12:25

## 2018-12-17 RX ADMIN — LISINOPRIL 5 MG: 5 TABLET ORAL at 07:42

## 2018-12-17 RX ADMIN — AMLODIPINE BESYLATE 10 MG: 10 TABLET ORAL at 07:43

## 2018-12-17 RX ADMIN — MYCOPHENOLATE MOFETIL 1000 MG: 250 CAPSULE ORAL at 07:42

## 2018-12-17 RX ADMIN — PREDNISONE 5 MG: 5 TABLET ORAL at 07:43

## 2018-12-17 RX ADMIN — CLOTRIMAZOLE 1 TROCHE: 10 LOZENGE ORAL at 07:42

## 2018-12-17 RX ADMIN — TACROLIMUS 1 MG: 1 CAPSULE ORAL at 07:42

## 2018-12-17 RX ADMIN — ACETAMINOPHEN 1000 MG: 500 TABLET, FILM COATED ORAL at 07:41

## 2018-12-17 RX ADMIN — TRAMADOL HYDROCHLORIDE 100 MG: 50 TABLET, COATED ORAL at 12:28

## 2018-12-17 RX ADMIN — CHLORHEXIDINE GLUCONATE 0.12% ORAL RINSE 15 ML: 1.2 LIQUID ORAL at 07:43

## 2018-12-17 RX ADMIN — Medication 25000 UNITS: at 07:42

## 2018-12-17 RX ADMIN — HYDRALAZINE HYDROCHLORIDE 100 MG: 100 TABLET ORAL at 07:42

## 2018-12-17 ASSESSMENT — MIFFLIN-ST. JEOR: SCORE: 1544.7

## 2018-12-17 ASSESSMENT — ACTIVITIES OF DAILY LIVING (ADL)
ADLS_ACUITY_SCORE: 15

## 2018-12-17 ASSESSMENT — PAIN DESCRIPTION - DESCRIPTORS
DESCRIPTORS: ACHING
DESCRIPTORS: ACHING

## 2018-12-17 NOTE — PLAN OF CARE
Discharge Planner OT   Patient plan for discharge: Home   Current status: Facilitated education on ECWS and techniques to adhere to abdominal precautions while completing I/ADLs requiring lifting. Pt declining OOB activity due to abdominal pain. Pt with no further concerns about discharging home at this time.   Barriers to return to prior living situation: Medical stability, pain  Recommendations for discharge: Home with continued HEP  Rationale for recommendations: Pt near functional baseline in I/ADLs. Pt would benefit from continuing HEP at home to promote strength and activity tolerance for I/ADLs.        Entered by: Maeve Maravilla 12/17/2018 9:38 AM

## 2018-12-17 NOTE — PROGRESS NOTES
[facility]  Met with pt to discuss plans for HC.  Pt to be discharged home  12/17 and has agreed to have [facility] follow with services of SN an [facility]OC[facility]. Patient care support center processing referral.  Pt verbalized understanding that initial visit is scheduled for  12/18 or 12/19.   Pt has 24 hour phone number for [facility] for any questions or concerns.    Thank you  Missy Leyva RN, BSN  [facility] Liaison  [facility]  [phone]

## 2018-12-17 NOTE — PLAN OF CARE
Vital signs stable, patient taking Tramadol every 12 hours for abdominal discomfort with good pain control. Patient up ad edith in room. Patient emptying ileostomy himself. Patient on room air when awake and wears 2L O2 at night. Potential discharge home today.

## 2018-12-17 NOTE — PLAN OF CARE
Occupational Therapy Discharge Summary    Reason for therapy discharge:    Discharged to home.    Progress towards therapy goal(s). See goals on Care Plan in [facility] electronic health record for goal details.  Goals partially met.  Barriers to achieving goals:   discharge from facility.    Therapy recommendation(s):    Continue home exercise program.

## 2018-12-17 NOTE — TELEPHONE ENCOUNTER
Called pt to request that he have his level checked 12/19. Pt agreed. Scheduling at [facility] at 8 am 12/19.

## 2018-12-17 NOTE — DISCHARGE SUMMARY
[facility]  Discharge Summary  Colon and Rectal Surgery     Murray Nicholson MRN# [id number]   YOB: 1955 Age: 63 year old     Date of Admission:  12/11/2018  Date of Discharge::  12/17/2018  Admitting Physician:  Rick Tran MD  Discharge Physician:  Rick Tran MD  Primary Care Physician:        Yahir Turcios          Admission Diagnoses:   Diverticulitis Of Colon   Colostomy status (H)          Discharge Diagnosis:   S/p laparoscopic colostomy  Diverting loop ileostomy         Procedures:   12/11/18 - Laparoscopic colostomy takedown, extensive lysis of adhesions, flexible sigmoidoscopy, and diverting loop ileostomy - Dr. Tran          Consultations:   WOUND OSTOMY CONTINENCE NURSE  IP CONSULT  NEPHROLOGY ESRD ADULT IP CONSULT  NUTRITION SERVICES ADULT IP CONSULT  OCCUPATIONAL THERAPY ADULT IP CONSULT  PHYSICAL THERAPY ADULT IP CONSULT  SOCIAL WORK IP CONSULT  RESPIRATORY CARE IP CONSULT  CARDIOLOGY HEART FAILURE (HF) IP CONSULT  VASCULAR ACCESS CARE ADULT IP CONSULT         Imaging Studies:     Results for orders placed or performed during the hospital encounter of 12/11/18   POC US Guidance Needle Placement    Impression    Bilateral TAP block.      *Note: Due to a large number of results and/or encounters for the requested time period, some results have not been displayed. A complete set of results can be found in Results Review.            Medications Prior to Admission:     Medications Prior to Admission   Medication Sig Dispense Refill Last Dose     albuterol (PROAIR HFA/PROVENTIL HFA/VENTOLIN HFA) 108 (90 Base) MCG/ACT inhaler Inhale 2 puffs into the lungs every 4 hours as needed for shortness of breath / dyspnea or wheezing   Past Week at Unknown time     amLODIPine (NORVASC) 10 MG tablet Take 1 tablet (10 mg) by mouth daily 90 tablet 3 12/10/2018 at 0800     ASPIRIN 81 MG OR TABS Take 1 tablet (81 mg) by mouth at bedtime   Past Week at Unknown  time     atorvastatin (LIPITOR) 40 MG tablet Take 1 tablet (40 mg) by mouth daily (Patient taking differently: Take 40 mg by mouth daily Takes in the afternoon.) 90 tablet 3 12/10/2018 at 1200     biotin (BIOTIN 5000) 5 MG CAPS Take 5 mg by mouth daily 30 capsule 3 12/10/2018 at 1200     blood glucose monitoring (ACCU-CHEK FASTCLIX) lancets Use to test blood sugar 2-3 times daily or as directed.  Ok to substitute alternative if insurance prefers. 102 each 11 12/11/2018 at Unknown time     blood glucose monitoring (NO BRAND SPECIFIED) test strip Use to test blood sugar 2-3 times daily or as directed. 100 each 11 12/11/2018 at Unknown time     chlorhexidine (PERIDEX) 0.12 % solution Swish and spit 15 mLs in mouth 2 times daily 900 mL 0 12/11/2018 at Unknown time     cloNIDine (CATAPRES) 0.1 MG tablet Take 1 tablet (0.1 mg) by mouth At Bedtime 90 tablet 3 12/10/2018 at 2200     clotrimazole 10 MG kalpana Place 1 Kalpana (10 mg) inside cheek 4 times daily 120 Kalpana 11 12/10/2018 at 2200     fluticasone (FLONASE) 50 MCG/ACT spray Spray 1-2 sprays into both nostrils daily 16 g 11 Past Week at Unknown time     gentian violet 1 % solution Take 0.5 mLs by mouth 4 times daily 30 mL 1 Past Month at Unknown time     hydrALAZINE (APRESOLINE) 100 MG TABS tablet Take 1 tablet (100 mg) by mouth every 8 hours 90 tablet 11 12/10/2018 at 2200     insulin aspart (NOVOLOG PEN) 100 UNIT/ML injection Inject 1-7 Units Subcutaneous 4 times daily (before meals and nightly) 9 mL 3 Past Week at Unknown time     insulin isophane human (HUMULIN N PEN) 100 UNIT/ML injection Inject 16 Units Subcutaneous every morning (before breakfast) 6 mL 11 12/11/2018 at 0520     insulin isophane human (HUMULIN N PEN) 100 UNIT/ML injection Inject 12 Units Subcutaneous daily (with dinner) 3 mL 11 12/11/2018 at Unknown time     lisinopril (PRINIVIL/ZESTRIL) 5 MG tablet Take 1 tablet (5 mg) by mouth daily 90 tablet 3 12/10/2018 at 0800     loratadine (CLARITIN) 10  MG tablet Take 10 mg by mouth daily Reported on 5/3/2017   Past Week at Unknown time     melatonin 1 MG TABS tablet Take 2 tablets (2 mg) by mouth nightly as needed for sleep 120 tablet 1 Past Week at Unknown time     mycophenolate (GENERIC EQUIVALENT) 250 MG capsule Take 4 capsules (1,000 mg) by mouth 2 times daily 240 capsule 11 12/10/2018 at 2200     NEPHROCAPS 1 MG capsule Take 1 capsule by mouth daily 120 capsule 0 12/11/2018 at 0700     ondansetron (ZOFRAN) 4 MG tablet Take 1 tablet (4 mg) by mouth every 6 hours as needed for nausea While taking neomycin and flagyl. 3 tablet 0 12/10/2018 at 2000     pantoprazole (PROTONIX) 40 MG EC tablet Take 1 tablet (40 mg) by mouth 2 times daily (before meals) 60 tablet 11 12/10/2018 at 2000     predniSONE (DELTASONE) 5 MG tablet Take 1 tablet (5 mg) by mouth daily 60 tablet 0 12/10/2018 at 1200     simethicone (MYLICON) 80 MG chewable tablet Take 1 tablet (80 mg) by mouth every 6 hours as needed for cramping 180 tablet  Past Week at Unknown time     sulfamethoxazole-trimethoprim (BACTRIM/SEPTRA) 400-80 MG per tablet Once per day on Tuesday, Thursday and Saturday. Take after dialysis on dialysis days. 14 tablet 3 Past Week at Unknown time     tacrolimus (GENERIC EQUIVALENT) 1 MG capsule Take 2 capsules (2 mg) by mouth 2 times daily 120 capsule 11 12/11/2018 at 0700     traMADol (ULTRAM) 50 MG tablet Take 1 tablet (50 mg) by mouth every 12 hours as needed for moderate pain 10 tablet 0 Past Week at Unknown time     pentamidine (NEBUPENT) 300 MG neb solution Inhale 300 mg into the lungs every 28 days Last given 9/19/18 300 mg 0 More than a month at Unknown time     triamcinolone (KENALOG) 0.1 % ointment Apply topically 2 times daily 80 g 0 More than a month at Unknown time     Urea 40 % CREA Externally apply topically daily 85 g 1 More than a month at Unknown time     [DISCONTINUED] magnesium citrate solution Take 296 mLs by mouth See Admin Instructions Refer to surgery  handout. 296 mL 0 12/10/2018 at 2200     [DISCONTINUED] polyethylene glycol (MIRALAX) packet Take 238 g by mouth See Admin Instructions One 8.3-ounce bottle (238 g).  Refer to surgery packet. 238 g 0 12/10/2018 at Unknown time     [DISCONTINUED] polyethylene glycol (MIRALAX/GLYCOLAX) Packet Take 17 g by mouth daily as needed for constipation 7 packet  12/10/2018 at Unknown time              Discharge Medications:     Current Discharge Medication List      CONTINUE these medications which have NOT CHANGED    Details   albuterol (PROAIR HFA/PROVENTIL HFA/VENTOLIN HFA) 108 (90 Base) MCG/ACT inhaler Inhale 2 puffs into the lungs every 4 hours as needed for shortness of breath / dyspnea or wheezing      amLODIPine (NORVASC) 10 MG tablet Take 1 tablet (10 mg) by mouth daily  Qty: 90 tablet, Refills: 3    Associated Diagnoses: Hypertension goal BP (blood pressure) < 130/80      ASPIRIN 81 MG OR TABS Take 1 tablet (81 mg) by mouth at bedtime      atorvastatin (LIPITOR) 40 MG tablet Take 1 tablet (40 mg) by mouth daily  Qty: 90 tablet, Refills: 3    Comments: 90 day supply if able.  Associated Diagnoses: Heart replaced by transplant (H)      biotin (BIOTIN 5000) 5 MG CAPS Take 5 mg by mouth daily  Qty: 30 capsule, Refills: 3    Associated Diagnoses: Brittle nails      blood glucose monitoring (ACCU-CHEK FASTCLIX) lancets Use to test blood sugar 2-3 times daily or as directed.  Ok to substitute alternative if insurance prefers.  Qty: 102 each, Refills: 11    Associated Diagnoses: Type 2 diabetes mellitus with stage 5 chronic kidney disease not on chronic dialysis, without long-term current use of insulin (H)      blood glucose monitoring (NO BRAND SPECIFIED) test strip Use to test blood sugar 2-3 times daily or as directed.  Qty: 100 each, Refills: 11    Associated Diagnoses: Type 2 diabetes mellitus with stage 5 chronic kidney disease not on chronic dialysis, without long-term current use of insulin (H)      chlorhexidine  (PERIDEX) 0.12 % solution Swish and spit 15 mLs in mouth 2 times daily  Qty: 900 mL, Refills: 0    Associated Diagnoses: Oral ulceration      cloNIDine (CATAPRES) 0.1 MG tablet Take 1 tablet (0.1 mg) by mouth At Bedtime  Qty: 90 tablet, Refills: 3    Comments: 90 day supply if able.  Associated Diagnoses: Hypertension; Heart replaced by transplant (H)      clotrimazole 10 MG kalpana Place 1 Kalpana (10 mg) inside cheek 4 times daily  Qty: 120 Kalpana, Refills: 11    Associated Diagnoses: Candidiasis of mouth      fluticasone (FLONASE) 50 MCG/ACT spray Spray 1-2 sprays into both nostrils daily  Qty: 16 g, Refills: 11    Associated Diagnoses: Nasal congestion      gentian violet 1 % solution Take 0.5 mLs by mouth 4 times daily  Qty: 30 mL, Refills: 1    Associated Diagnoses: Oral ulceration      hydrALAZINE (APRESOLINE) 100 MG TABS tablet Take 1 tablet (100 mg) by mouth every 8 hours  Qty: 90 tablet, Refills: 11    Associated Diagnoses: Essential hypertension      insulin aspart (NOVOLOG PEN) 100 UNIT/ML injection Inject 1-7 Units Subcutaneous 4 times daily (before meals and nightly)  Qty: 9 mL, Refills: 3    Associated Diagnoses: Type 2 diabetes mellitus with diabetic nephropathy, with long-term current use of insulin (H)      !! insulin isophane human (HUMULIN N PEN) 100 UNIT/ML injection Inject 16 Units Subcutaneous every morning (before breakfast)  Qty: 6 mL, Refills: 11    Associated Diagnoses: Type 2 diabetes mellitus with diabetic nephropathy, with long-term current use of insulin (H)      !! insulin isophane human (HUMULIN N PEN) 100 UNIT/ML injection Inject 12 Units Subcutaneous daily (with dinner)  Qty: 3 mL, Refills: 11    Associated Diagnoses: Type 2 diabetes mellitus with diabetic nephropathy, with long-term current use of insulin (H)      lisinopril (PRINIVIL/ZESTRIL) 5 MG tablet Take 1 tablet (5 mg) by mouth daily  Qty: 90 tablet, Refills: 3    Associated Diagnoses: Hypertension goal BP (blood pressure) <  130/80      loratadine (CLARITIN) 10 MG tablet Take 10 mg by mouth daily Reported on 5/3/2017    Associated Diagnoses: Hypertensive cardiopathy; SOB (shortness of breath)      melatonin 1 MG TABS tablet Take 2 tablets (2 mg) by mouth nightly as needed for sleep  Qty: 120 tablet, Refills: 1    Associated Diagnoses: Heart transplanted (H)      mycophenolate (GENERIC EQUIVALENT) 250 MG capsule Take 4 capsules (1,000 mg) by mouth 2 times daily  Qty: 240 capsule, Refills: 11    Comments: Increase in dose  Associated Diagnoses: Heart transplanted (H)      NEPHROCAPS 1 MG capsule Take 1 capsule by mouth daily  Qty: 120 capsule, Refills: 0    Associated Diagnoses: End stage renal disease (H)      ondansetron (ZOFRAN) 4 MG tablet Take 1 tablet (4 mg) by mouth every 6 hours as needed for nausea While taking neomycin and flagyl.  Qty: 3 tablet, Refills: 0    Associated Diagnoses: Diverticulitis of intestine, part unspecified, with perforation and abscess with bleeding      pantoprazole (PROTONIX) 40 MG EC tablet Take 1 tablet (40 mg) by mouth 2 times daily (before meals)  Qty: 60 tablet, Refills: 11    Associated Diagnoses: Duodenitis      predniSONE (DELTASONE) 5 MG tablet Take 1 tablet (5 mg) by mouth daily  Qty: 60 tablet, Refills: 0    Associated Diagnoses: Heart transplanted (H)      simethicone (MYLICON) 80 MG chewable tablet Take 1 tablet (80 mg) by mouth every 6 hours as needed for cramping  Qty: 180 tablet    Associated Diagnoses: Flatulence, eructation and gas pain      sulfamethoxazole-trimethoprim (BACTRIM/SEPTRA) 400-80 MG per tablet Once per day on Tuesday, Thursday and Saturday. Take after dialysis on dialysis days.  Qty: 14 tablet, Refills: 3    Associated Diagnoses: Heart transplanted (H)      tacrolimus (GENERIC EQUIVALENT) 1 MG capsule Take 2 capsules (2 mg) by mouth 2 times daily  Qty: 120 capsule, Refills: 11    Associated Diagnoses: Heart transplanted (H)      traMADol (ULTRAM) 50 MG tablet Take 1 tablet  (50 mg) by mouth every 12 hours as needed for moderate pain  Qty: 10 tablet, Refills: 0    Associated Diagnoses: Moderate pain      pentamidine (NEBUPENT) 300 MG neb solution Inhale 300 mg into the lungs every 28 days Last given 9/19/18  Qty: 300 mg, Refills: 0    Associated Diagnoses: Heart replaced by transplant (H)      triamcinolone (KENALOG) 0.1 % ointment Apply topically 2 times daily  Qty: 80 g, Refills: 0    Associated Diagnoses: Xerosis of skin      Urea 40 % CREA Externally apply topically daily  Qty: 85 g, Refills: 1    Associated Diagnoses: Brittle nails       !! - Potential duplicate medications found. Please discuss with provider.      STOP taking these medications       magnesium citrate solution Comments:   Reason for Stopping:         polyethylene glycol (MIRALAX) packet Comments:   Reason for Stopping:         polyethylene glycol (MIRALAX/GLYCOLAX) Packet Comments:   Reason for Stopping:                   Brief History of Illness:   Mr. Nicholson is a 62 yo Male with history of perforated diverticulitis c/b abscess and small bowel fistula who underwet a hand-assisted laparoscopic sigmoidectomy with end-colostomy and small bowel resection in August 2018. Patient also has PMH of T2DM (on insulin), ESRD (on HD), HTN, HLD, GERD, and CHF s/p heart transplant (on tacrolimus, mycophenolate, and prednisone). He presented to [facility] for the listed procedures above.            Hospital Course:   Patient tolerated the above procedures well. He was managed on the floor postoperatively with heart failure cardiology team following closely for his immunosuppression. His cardiopulmonary status remained stable throughout the hospital stay. Nephrology was following for hemodialysis (TThSa). His pain was well controlled with medications; initially dilaudid PCA, then po oxycodone and Tylenol. Patient's DLI was functioning and diet was advanced without issue to a low residue diet. Patient was ambulating independently and on  "room air by day of discharge.    Patient is to follow up in the [facility] in 1 week with Armida Gustafson NP and then with Dr. Tran in 2-3 weeks after.     Regarding immunosuppression, patient will have a tacrolimus level drawn outpatient on Wednesday and will follow up with cardiology outpatient next week. He will continue tacrolimus at 1 mg BID on discharge for now (as well as PTA mycophenolate and prednisone).          Day of Discharge Physical Exam:   Blood pressure (!) 162/98, pulse 94, temperature 98.3  F (36.8  C), temperature source Oral, resp. rate 16, height 1.753 m (5' 9\"), weight 75.9 kg (167 lb 6.4 oz), SpO2 100 %.    Gen: AAOx3, NAD  Pulm: Non-labored breathing on room air  Abd: Soft, non-distended, non-tender; incisions c/d/i   Ileostomy pink and viable with liquid dark brown stool and gas in bag  Ext:  Warm and well-perfused         Final Pathology Result:   SPECIMEN(S):   Colostomy     FINAL DIAGNOSIS:   COLOSTOMY, TAKEDOWN:   - Benign colon and skin with scarring, remote hemorrhage and foreign body   giant cell reaction compatible with   colostomy     I have personally reviewed all specimens and/or slides, including the   listed special stains, and used them   with my medical judgement to determine or confirm the final diagnosis.     Electronically signed out by:     Darvin Kenney M.D., [facility]     CLINICAL HISTORY:     Diverticulitis of colon   GROSS:   The specimen is received in formalin with proper patient identification,   labeled \"colostomy\".  The specimen   consists of a 3.4 cm diameter x 2.7 cm in length segment of bowel.  The   specimen is received with two open   ends.  At one end of the specimen is a 3.6 cm in diameter segment of tan   to brown skin.  The skin and features   no gross identifiable lesions.  The specimen is opened to reveal a   tan-brown mucosa with no gross identifiable   polyp, lesions or areas of ulceration.  Representative " sections are   submitted in cassette A1 (Dictated by:   Janine HOYT 12/11/2018 02:11 PM).     MICROSCOPIC:     Microscopic exam was performed            Discharge Instructions and Follow-Up:     Discharge Procedure Orders   Medication Therapy Management Referral   Referral Type: Med Therapy Management   Number of Visits Requested: 1     Home care nursing referral   Referral Type: Home Health Therapies & Aides   Number of Visits Requested: 1     MD face to face encounter   Order Comments: Documentation of Face to Face and Certification for Home Health Services    I certify that patient: Murray Nicholson is under my care and that I, or a nurse practitioner or physician's assistant working with me, had a face-to-face encounter that meets the physician face-to-face encounter requirements with this patient on: 12/14/2018.    This encounter with the patient was in whole, or in part, for the following medical condition, which is the primary reason for home health care: S/P Laparoscopic colostomy takedown, PATRICIA, flex sig and DLI . ESRD. History of heart transplant.    I certify that, based on my findings, the following services are medically necessary home health services: Nursing.    My clinical findings support the need for the above services because: Nurse is needed: To assess after changes in medications or other medical regimen and to teach and train about ostomy cares.     Further, I certify that my clinical findings support that this patient is homebound (i.e. absences from home require considerable and taxing effort and are for medical reasons or [employer] services or infrequently or of short duration when for other reasons) because: Requires assistance of another person or specialized equipment to access medical services because patient: Is unable to exit home safely on own.    Based on the above findings. I certify that this patient is confined to the home and needs intermittent skilled nursing care, physical  therapy and/or speech therapy.  The patient is under my care, and I have initiated the establishment of the plan of care.  This patient will be followed by a physician who will periodically review the plan of care.  Physician/Provider to provide follow up care: Yahir Turcios    Attending [facility] physician (the Medicare certified [facility] provider): Rick Tran MD  Physician Signature: See electronic signature associated with these discharge orders.  Date: 12/14/2018     Follow Up and recommended labs and tests   Order Comments: [facility]   Phone: [phone]   Fax: [phone]    For resumption of outpatient dialysis, T/Th/Sat            Home Health Care:   Arranged           Discharge Disposition:   Discharged to home      Condition at discharge: Stable    Jose Pierson, PGY1  General Surgery Resident  Colon and Rectal Surgery     Pt was seen and discussed with Dr. Sood, CRS Fellow, who discussed with staff, on day of discharge.

## 2018-12-17 NOTE — TELEPHONE ENCOUNTER
Pt requested that lab appointment be 12/18 as pt has other appt here that day. Making appt for 12/18 per pt request.

## 2018-12-17 NOTE — PLAN OF CARE
D/A/I:  Patient POD 5 after ileostomy, A&O x4.  Denied palpitations, difficulty breathing, SOB, dizziness, and nausea.  Lung sounds clear to auscultation, no abnormal heart sounds noted.  Ileostomy pink and moist as seen through ostomy bag, had good amount of loose brown stool, see I&O flowsheet.  In sinus rhythm with intermittent tachycardia, HR 90s-100s, SBP 120s-150s, SaO2 100% on room air.  Reported low abdominal pain that was managed with scheduled acetaminophen, prn tramadol, and hot packs.  Ambulated in room independently with steady gait.    P:  Continue to monitor pain, VS, heart rhythm, surgical site and ostomy integrity, fluid status, bowel status, cardiac and respiratory status.  Notify care team of changes in patient condition or other concerns.  Encourage activity to regain/maintain strength.  Potential discharge 12/17 if tacrolimus levels are stable.

## 2018-12-17 NOTE — TELEPHONE ENCOUNTER
From: Roxana Lebron   Sent: Monday, December 17, 2018 2:48 PM  To: '[email or web address]'  Cc: Roxana Lebron  Subject: Secure - Lab this Wed; appt next Fri - PHI    Murray:    I was asked to schedule a lab tomorrow, 12/18 (after your ENT appointment) and a clinic appointment next week, 12/28 (on Friday at 9:30am).    Attached is a PDF of the newest version of your appointment schedule.    Sincerely,    Roxana Lebron  Transplant   Phone [phone]  Fax [phone]  [email or web address]

## 2018-12-17 NOTE — PROGRESS NOTES
"  WO Nurse Inpatient [facility]   WO Nurse Inpatient Adult     Pouching plan of care:  -To use current Shashi flat 1 piece cut to fit with barrier ring at cut opening and to fill in crease.    -trial coloplast and shashi samples of convex light and soft convex pouches. Use both with barrier ring  -Order the pouch that provides the best secure fit.    Follow Up Assessment   Assessment of new loop Ileostomy Stoma complication(s) none   Mucocutaneous junction; intact   Peristomal complication(s) none   Pouch wear time:3-4 days,   Following today's visit:Patient /   is  able to demonstrate;       1. How to empty their pouch? yes      2. How to change their pouch?  yes      3. How to read and record intake and output correctly? yes    Objective data:  Patient history according to medical record: 62 YO M with w/ hx heart transplant and perforated diverticulitis who underwent a hand-assisted laparoscopic sigmoidectomy with end-colostomy and small bowel resection for fistula in August 2018, s/p lap colostomy (12/11/18), PATRICIA, flex sig and DLI (12/11).      Current Diet/Nutrition: Orders Placed This Encounter      Low Fiber Diet     TPN no   I/O last 3 completed shifts:  In: 480 [P.O.:480]  Out: 250 [Stool:250]  Labs:    Recent Labs   Lab 12/15/18  0907 12/14/18  0512   ALBUMIN 2.6*  --    HGB  --  9.4*        Physical Exam:  Current pouching system:Shashi 1 piece flat cut to fit with barrier ring  Reason for pouch change today: ostomy education and routine schedule  Stoma appearance: red, oval, moist, good turgor, edematous and protruberant  Stoma size; 1 3/4\" ,    Peristomal skin: intact and creases or folds present at 9 o'clock.  Firm from 9-3 o'clock, soft from 3-9 o'clock  Stoma output :brown, liquid and pasty (scant)  Abdominal  Assessment  soft , NG still in place?   Surgical Site: open to air  Pain: Dull ache  Is patient still on a PCA No    Interventions:  Patient's chart evaluated.  Focus of " today's visit: refitting of appliance, pouch change return demonstration and verbal instruction    Participant of teaching session today patient   Orders: Written  Change made with ostomy management today: Yes  Patient/family: active participation and performed with minimal verbal cues  Supplies:Gathered   Preparation for discharge: Registered for samples from , Prescriptions or note left on chart for MD to sign/complete, Prepared for discharge to home with homecare, Discussed making a WOC Nurse outpatient appointment upon discharge and Discussed when to follow up with a WOC Nurse in the future    Plan:  Learning needs: teaching complete at this time    Discussed plan of care with Patient and Physician  Nursing to notify the Provider(s) and re-consult the WOC Nurse if new ostomy concerns or discharge planned before next planned WOC visit.  Pt discharging to home today  Face to face time: 45 minutes

## 2018-12-17 NOTE — PROGRESS NOTES
Colorectal Surgery Progress Note  POD#6      Subjective: No acute events overnight. Pain well controlled. Tolerating LRD. Feels ready to go home.     Vitals:  Vitals:    12/16/18 1534 12/16/18 2029 12/16/18 2327 12/17/18 0600   BP: 124/82 146/90 (!) 150/91    BP Location: Right arm Left arm Right arm    Cuff Size: Adult Regular Adult Regular     Pulse:       Resp: 16 16 16    Temp: 98.3  F (36.8  C) 98.2  F (36.8  C)     TempSrc: Oral Oral     SpO2: 100% 100%     Weight:    75.9 kg (167 lb 6.4 oz)   Height:         I/O:  I/O last 3 completed shifts:  In: 480 [P.O.:480]  Out: 250 [Stool:250]    Physical Exam:  Gen: AAOx3, NAD  Pulm: Non-labored breathing   Abd: Soft, non-distended, incisions c/d/i Ileostomy viable w/ stool and gas output  Ext:  Warm and well-perfused    BMP  Recent Labs   Lab 12/16/18  0609 12/15/18  0907 12/14/18  0512 12/13/18  0632    133 132* 132*   POTASSIUM 4.2 3.4 3.5 4.1   CHLORIDE 97 94 93* 99   CO2 27 28 28 22   BUN 14 22 14 27   CR 4.12* 5.48* 4.37* 6.66*   GLC 86 110* 126* 104*   MAG 1.7 1.7 1.8 2.3   PHOS 3.1 3.6 3.4 4.2     CBC  Recent Labs   Lab 12/15/18  0907 12/14/18  0512 12/13/18  0632 12/12/18  0612 12/11/18  1153   HGB  --  9.4* 9.5* 9.9* 9.2*     --   --   --   --          ASSESSMENT: 62 YO M with w/ hx heart transplant and perforated diverticulitis who underwent a hand-assisted laparoscopic sigmoidectomy with end-colostomy and small bowel resection for fistula in August 2018, now POD6 s/p lap colostomy takedown, PATRICIA, flex sig and DLI (12/11).    -Neuro: tylenol krissy, dilaudid prn, tramadol prn melatonin  -CV:  -Heart failure service consulted for hx of heart transplant     -Immunosuppression with predisone, tacrolimus, mycophenolate    -Continue PTA amlodipine, clonidine, hydralazine, lisinopril  -Resp: Flonase  -FEN/GI: LRD  -Endo: High dose sliding scale insulin  -Renal: On HD TThSa, Nephrology following. Appreciate recs  -ID: Finished Periop cipro/flagyl  -Ppx:  HSQ TID, PPI, bactrim  Dispo: Plan home today most likely       Seen with fellow, Dr. Sood, who discussed with staff.    Susie Linares MD  General Surgery, PGY-3  [phone]

## 2018-12-17 NOTE — LETTER
Coordinator: TONY Mcknight Nicholson Schedule revised on 12/17/2018  CMV pos, EBV pending, Toxo pending MRN:  [id number] CMV neg, EBV pos, VZV pos   Physician: Klever Ernst MD  Transplant Date: 6/13/2018 Tues, Thurs and Sat - Dialysis days    Time Post  Transplant Procedures to Expect this Visit Visit Date    Appointment with JOSE Pérez Tuesday, 12/18/18    Phone call with ronald Leach Friday, 12/21/18    Appointment with Mellisa Suh NP Friday, 12/28/18   32 Weeks  Month 8 Labs & Biopsy. Clinic appt with ZULAY. Rowan. Friday, 2/1/19   40 Weeks  Month 10 Labs & Biopsy, CMV, EBV. Clinic appt with MD/LEWIS. Friday, 3/29/19   1st Annual 2 day visit: Extended Labs with DSAs, EBV, CMV, X-rays, DEXA, ECHO/MRI, Angiogram/RHC/IVUS/Biopsy, CPX, EKG.  Clinic appt with MD/NP. Daljit Donahue.  Friday, 6/7/18 &  Monday, 6/10/18   * Labs, including Prograf level, will be drawn in the [facility] Waiting Room prior to the biopsy unless indicated.  * Please take medication evening before to ensure 12-hour trough the next AM. (i.e. Labs at 8:30 AM, take medication the evening before at 8:30 PM.)  * Do not take medication in AM until after blood drawn.     Tuesday 12/18/18 7:30 am Appointment with Dr. Bañuelos, [facility]  4th floor    8:30 am [facility]  1st floor     Friday 12/21/18 9:30 am Phone call with ronald Leach N/A     Friday 12/28/18 9:30 am Appointment with Mellisa Suh NP [facility]  3rd floor     Friday 2/1/19    (32 weeks, month 8) 8:00 am Labs and [facility] Blood Draw [facility]  1st floor    8:30 am Appointment with Cleo Marks NP [facility]  3rd floor    9:00 am Shuttle to the Hospital    9:30 am Heart biopsy  (Fast for three hours before testing) [facility] waiting room  2nd floor     Friday  3/29/19     (40 weeks, month 10) 8:00 am [facility]  1st floor     8:30 am Appointment with Cleo Marks NP [facility]  3rd floor    9:00 am Shuttle to the Hospital    9:30 am Heart biopsy  (Fast for three hours before testing) [facility] waiting room  2nd floor

## 2018-12-17 NOTE — PROGRESS NOTES
Discharge Note:  Refer to flow sheets for full assessments, VS, labs etc. Monitor NSR 80's-'s. ES Tylenol and PRN Oxycodone controlling lower abdomen discomfort. Ileostomy intact, care per patient. OK'ed to discontinue home. Refer to discontinue orders.DISCHARGE   Discharged to: Home  Via: friend's car  Accompanied by: RN/in W/C to discontinue Pharmacy then Lobby to meet ride.  Discharge Instructions: diet, activity, medications, follow up appointments, when to call the MD, and what to watchout for (i.e. s/s of infection, increasing SOB, palpitations, chest pain,)routine Heart Transplant/HF cares/precautions, postop ColoRectal surgery cares/precautions etc.   Prescriptions: To be filled by [facility] pharmacy; medication list reviewed & sent with pt  Follow Up Appointments: arranged; information given  Belongings: All sent with pt  IV: PIV out. Right SC Dialysis port CDI  Telemetry: off  Pt exhibits understanding of above discharge instructions; all questions answered.  Discharge Paperwork: faxed by 6C Care Coordinator, 6C NST

## 2018-12-18 ENCOUNTER — OFFICE VISIT (OUTPATIENT)
Dept: OTOLARYNGOLOGY | Facility: CLINIC | Age: 63
End: 2018-12-18
Payer: MEDICARE

## 2018-12-18 ENCOUNTER — PATIENT OUTREACH (OUTPATIENT)
Dept: CARE COORDINATION | Facility: CLINIC | Age: 63
End: 2018-12-18

## 2018-12-18 ENCOUNTER — TELEPHONE (OUTPATIENT)
Dept: FAMILY MEDICINE | Facility: CLINIC | Age: 63
End: 2018-12-18

## 2018-12-18 VITALS — BODY MASS INDEX: 25.14 KG/M2 | HEIGHT: 69 IN | WEIGHT: 169.75 LBS

## 2018-12-18 DIAGNOSIS — Z93.3 COLOSTOMY STATUS (H): Primary | ICD-10-CM

## 2018-12-18 DIAGNOSIS — H90.3 ASYMMETRICAL SENSORINEURAL HEARING LOSS: Primary | ICD-10-CM

## 2018-12-18 DIAGNOSIS — Z94.1 HEART TRANSPLANTED (H): ICD-10-CM

## 2018-12-18 DIAGNOSIS — Z94.1 HEART REPLACED BY TRANSPLANT (H): ICD-10-CM

## 2018-12-18 LAB
ANION GAP SERPL CALCULATED.3IONS-SCNC: 11 MMOL/L (ref 3–14)
BUN SERPL-MCNC: 30 MG/DL (ref 7–30)
CALCIUM SERPL-MCNC: 8.8 MG/DL (ref 8.5–10.1)
CHLORIDE SERPL-SCNC: 97 MMOL/L (ref 94–109)
CO2 SERPL-SCNC: 25 MMOL/L (ref 20–32)
CREAT SERPL-MCNC: 6.83 MG/DL (ref 0.66–1.25)
CRP SERPL-MCNC: 32.2 MG/L (ref 0–8)
ERYTHROCYTE [DISTWIDTH] IN BLOOD BY AUTOMATED COUNT: 17.8 % (ref 10–15)
GFR SERPL CREATININE-BSD FRML MDRD: 8 ML/MIN/1.7M2
GLUCOSE SERPL-MCNC: 135 MG/DL (ref 70–99)
HCT VFR BLD AUTO: 33.9 % (ref 40–53)
HGB BLD-MCNC: 10.6 G/DL (ref 13.3–17.7)
MAGNESIUM SERPL-MCNC: 1.6 MG/DL (ref 1.6–2.3)
MCH RBC QN AUTO: 27.7 PG (ref 26.5–33)
MCHC RBC AUTO-ENTMCNC: 31.3 G/DL (ref 31.5–36.5)
MCV RBC AUTO: 89 FL (ref 78–100)
PHOSPHATE SERPL-MCNC: 3.3 MG/DL (ref 2.5–4.5)
PLATELET # BLD AUTO: 269 10E9/L (ref 150–450)
POTASSIUM SERPL-SCNC: 4 MMOL/L (ref 3.4–5.3)
RBC # BLD AUTO: 3.83 10E12/L (ref 4.4–5.9)
SODIUM SERPL-SCNC: 132 MMOL/L (ref 133–144)
TACROLIMUS BLD-MCNC: 7.3 UG/L (ref 5–15)
TME LAST DOSE: NORMAL H
WBC # BLD AUTO: 3.7 10E9/L (ref 4–11)

## 2018-12-18 PROCEDURE — 80197 ASSAY OF TACROLIMUS: CPT | Performed by: INTERNAL MEDICINE

## 2018-12-18 ASSESSMENT — MIFFLIN-ST. JEOR: SCORE: 1555.38

## 2018-12-18 ASSESSMENT — PAIN SCALES - GENERAL: PAINLEVEL: MODERATE PAIN (4)

## 2018-12-18 NOTE — PATIENT INSTRUCTIONS
Thank you for choosing  Physicians.  Please follow up with Dr. Bañuelos in approximately 8 weeks.    [phone] appointment scheduling option 1 and nurse advice option 3.

## 2018-12-18 NOTE — NURSING NOTE
Chief Complaint   Patient presents with     RECHECK     3 week follow up      Jairo Guadarrama, EMT

## 2018-12-18 NOTE — DISCHARGE SUMMARY
Dialysis Discharge Summary Brief    [facility]  Division of Nephrology  Nephrology Discharge Dialysis Orders  Ph: [phone]  Fax: [phone]    Murray Nicholson  MRN: [id number]  YOB: 1955    [facility] Dialysis Unit: [place]  Primary Nephrologist: Dr. Mcpherson/Cleo Yu NP    Date of Admission: 12/11/2018  Date of Discharge: 12/17/2018    Please see faxed hospitalist discharge summary for full details of the admission. Murray was last dialyzed on Saturday 12/14. Please page me at  with any questions. Thanks much. Kristi Armas PA-C    Murray Nicholson is a 63 year old male with OHT (6/2018), ESRD, HTN, perforated diverticulitis s/p sigmoidectomy with end-colostomy and small bowel resection for fistula in August 2018, admitted now s/p lap colostomy take down now with ileostomy.      ESRD:  2/2 HTN and diabetes (heart failure worsened after ESRD dx so unlikely CRS as etiology of ESRD). Dialyzes TTS at [facility] under the care of Dr. Mcpherson. Run time: 4 hrs. EDW: 74 kg (though pt feels it should be higher). Access: tunneled RIJ (has had vein mapping and plan for fistula)       BP: variable 130-150's; usual pre HD pressures 130-150's, post pressures 120-140's, lowest pressure ~ 100. PTA  amlodipine 10 mg qday, clonidine 0.1 mg q HS, hydralazine 100 mg tid, lisinopril 5 mg qday     Volume: EDW: 74 kg, though pt would like to increase as he feels very tired at this weight. Appears euvolemic. Minimal UOP. Usual UF 0.5 to 1.5 kg. Ileostomy with minimal outpt.  - Will increase EDW to 76-76.5 kg (pt feels much better at a higher dry weight)     Anemia: hgb 10.6; recent labs with hgb 10's, on epogen 8000 units per HD, venofer 50 mg qTuesday    BMD: Ca 8.8, phos 3.3; recent . Not on Vit D or phos binder     OHT: on predisone, tacrolimus, mycophenolate     S/p lap colostomy takedown with ileostomy: on 12/11.      [x] Resume all previous dialysis  orders with exception as noted below    New Orders (if not applicable put NA):  Estimated Dry Weight 76 kg   Dialysis Duration    Dialysis Access    Antibiotics (dose per dialysis, end date)            Labs to be drawn at dialysis    Other major changes to dialysis prescription (e.g. Dialysate bath, heparin, blood flow rate, etc)      Medication changes (also fax the unit a copy of the discharge summary)              Name of physician completing this form: Kristi Armas PA-C

## 2018-12-18 NOTE — TELEPHONE ENCOUNTER
Rosa called from  home care stating patient was discharged from the hospital yesterday and they received home care orders however, patient doesn't qualify    Rosa reports patient is currently driving around and because he is not homebound per Medicare he doesn't qualify    Rosa also states patient seems to be doing very good

## 2018-12-18 NOTE — TELEPHONE ENCOUNTER
From: Roxana Lebron   Sent: Tuesday, December 18, 2018 2:23 PM  To: '[email or web address]'  Cc: Roxana Lebron  Subject: Secure - appt [facility] Fri [facility]    Murray:    I adjusted your time with Mellisa Suh NP on 12/28 to 10am (appointment was originally 9:30am). I was not asked to schedule you for labs that morning.    Please write back to confirm the time change.    Sincerely,    Roxana JOHNSON  [phone]

## 2018-12-18 NOTE — TELEPHONE ENCOUNTER
Dr. Turcios and Jaky Canela RN Care Coordinator     Patient will not receive home care as he does NOT qualify     Silvana Shen Registered Nurse   [facility]

## 2018-12-18 NOTE — PROGRESS NOTES
Clinic Care Coordination Contact    Clinic Care Coordination Contact  OUTREACH    Referral Information:  Referral Source: IP Handoff    Primary Diagnosis: GI Disorders    Chief Complaint   Patient presents with     Clinic Care Coordination - Post Hospital     Clinic Care Coordination RN          [place] Utilization: Hospitalization 12/11-12/17/2018 S/P Laparoscopic colostomy diverting loop ileostomy      Utilization    Last refreshed: 12/18/2018  9:07 AM:  Hospital Admissions 6           Last refreshed: 12/18/2018  9:07 AM:  ED Visits 0           Last refreshed: 12/18/2018  9:07 AM:  No Show Count (past year) 21              Current as of: 12/18/2018  9:07 AM              Clinical Concerns:  Current Medical Concerns:  Colostomy bag draining liquid stool  Patient is aware to drink plenty of water to prevent dehydration  Patient describes the surgical pain as a # 5 and is taking Tylenol and Tramadol which takes the edge of tape covering surgical site .Patient has a  MTM telephone visit set up 12/21  Reviewed partial medication list and then patient reports he needs to go to dialysis  Patient agrees to a follow up call in 1 week       Health Maintenance Reviewed: Due/Overdue   Clinical Pathway: None    Medication Management:  Partially reviewed MTM visit 12/21 no questions about medication             Equipment Currently Used at Home: none    Goals:   Goals        Lifestyle    Increase physical activity     Notes - Note created  7/3/2018  1:51 PM by Carrie Tran, RN    Goal Statement: I will start cardiac rehab  Measure of Success: starts cardiac rehab  Supportive Steps to Achieve: support of RN CC  Barriers: none  Strengths: willingness to participate   Date to Achieve By: 7-15-18                Patient/Caregiver understanding: Patient expresses good understanding of discharge instructions     Outreach Frequency: monthly  Future Appointments              [facility]    In 1 week Mellisa Suh APRN CNP [facility], [facility]    In 1 month  LAB  Health Lab, [facility]    In 1 month Cleo Marks NP [facility], [facility]    In 1 month U2A ROOM 7 Unit 2A [facility] O    In 3 months  LAB  Health Lab, [facility]    In 3 months Cleo Marks NP [facility], [facility]    In 3 months U2A ROOM 7 Unit 2A [facility] O    In 5 months Klever Ernst MD [facility], [facility]          Plan:   CC will give contact information with [facility] RN  CC will follow up in 3-5 business days     Jaky Canela RN / Clinical Care Coordinator     [facility]   [email or web address]  Office :  [phone] / Fax :  [phone]

## 2018-12-18 NOTE — TELEPHONE ENCOUNTER
"December 18, 2018: I left a message to let the patient know about this appt (below) and asked him to call back and confirm he can attend.    Date: 12/28/2018 Status: [facility]   Time: 9:30 AM Length: 30   Visit Type: UMP RETURN HEART TRANSPLANT LEXII: [id number]   Provider: Mellisa Suh APRN CNP Department:  CARDIOVASCULAR CTR   Notes: heart TX F/U (Per Cleo in 12/17 staff msg, \"follow up in clinic next week\")     Roxana Lebron  Post-Heart Transplant   [phone]      "

## 2018-12-18 NOTE — LETTER
12/18/2018       RE: Murray Nicholson  [address]  [place] [address]     Dear Colleague,    Thank you for referring your patient, Murray Nicholson, to the [facility] EAR NOSE AND THROAT at [facility]. Please see a copy of my visit note below.    HISTORY OF PRESENT ILLNESS:  The patient is for followup today.   He has an area of the palate that seems to be a little bit eroded and it was either from some sort of an odd fungal infection, transplant reaction, or some kind of osteopenia after a lot of his surgery.  It has not been bothering him very much.  We put a 2-3 cm area out and I was worried this was going to be some kind of invasive fungus, cancer, etc.  He comes now and is much improved, but he has some localized pain.      PHYSICAL EXAMINATION:  Alert, oriented x3 and pleasant.  Skin of the face, lips, and neck on him is quite normal.  Neck exam shows no masses or adenopathy.  Teeth are not loose.  When I look near the foramen on him bilaterally, there is clearly some bone that looks to be devitalized.  It does not form sequestrum.  Near it seems to be some granulation tissue that wants to grow over this.  It looks better than it did before, and it is a very strange appearance.  No other masses or lesions are seen.      ASSESSMENT:  Patient with a history of mouth lesion.       PLAN:  I told him that we should just may be perhaps drill this area down and see if it heals over, drill down the active bleeding.  It may be some kind of unusual side effect from his transplantation or something with one of the medicines that would cause him to have almost like a reaction to bone that is similar to what one sees with bisphosphonates.  I will plan to see him after the New Year in eight weeks or so.  I told him a short trip to the operating room would be useful to grind down the bone to see if we can get it to heal.      FO/ms         Again, thank you for allowing me to  participate in the care of your patient.      Sincerely,    Tristan Bañuelos MD

## 2018-12-19 ENCOUNTER — PRE VISIT (OUTPATIENT)
Dept: TRANSPLANT | Facility: CLINIC | Age: 63
End: 2018-12-19

## 2018-12-19 DIAGNOSIS — Z94.1 HEART REPLACED BY TRANSPLANT (H): Primary | ICD-10-CM

## 2018-12-20 ENCOUNTER — PATIENT OUTREACH (OUTPATIENT)
Dept: SURGERY | Facility: CLINIC | Age: 63
End: 2018-12-20

## 2018-12-20 NOTE — PROGRESS NOTES
[facility]: Post-Discharge Note  SITUATION                                                      Admission:    Admission Date: 12/11/18   Reason for Admission: Laparoscopic colostomy takedown. Laparoscopic extensive lysis of adhesions (60 minutes). Flexible sigmoidoscopy. Diverting loop ileostomy.   Discharge:   Discharge Date: 12/17/18  Discharge Diagnosis: Laparoscopic colostomy takedown. Laparoscopic extensive lysis of adhesions (60 minutes). Flexible sigmoidoscopy. Diverting loop ileostomy.     BACKGROUND                                                      12/11- Laparoscopic colostomy takedown. Laparoscopic extensive lysis of adhesions (60 minutes). Flexible sigmoidoscopy. Diverting loop ileostomy. With Dr. Tran       ASSESSMENT      Discharge Assessment  Patient reports symptoms are: Improved  Does the patient have all of their medications?: Yes  Does patient know what their new medications are for?: Yes  Does patient have a follow-up appointment scheduled?: Yes  Does patient have any other questions or concerns?: No    Post-op  Did the patient have surgery or a procedure: Yes  Incision: closed  Drainage: No  Bleeding: none  Fever: No  Chills: No  Redness: No  Warmth: No  Swelling: No  Incision site pain: No  Closure: dissolving  Eating & Drinking: eating and drinking without complaints/concerns  PO Intake: low fiber  Bowel Function: loose stools  Date of last BM: 12/20/18  Urinary Status: voiding without complaint/concerns    Surgery              General Surgery & Colorectal Assessment  Surgeon: Dr. Tran  Surgery located at: [facility]  Admission status: Morning admission  Procedure: Laparoscopic colostomy takedown. Laparoscopic extensive lysis of adhesions (60 minutes). Flexible sigmoidoscopy. Diverting loop ileostomy.   Anesthesia: Other  Taking Lovenox: Yes  Patient given soap: Yes  Patient given bowel prep: Yes  Bowel prep type: Miralax-Gatorade  Preop consult with anesthesia  completed: Yes  PAC?: Yes  Activity restrictions: Yes  Type of restriction: No lifting in excess of 10 lbs  Length of restriction: 6 weeks post-op  Date patient may resume normal activty: 01/22/19  Date patient may return to work: 01/22/19  Taking opioids: Yes  Taking more than prescribed: No  Alternating with non-opioid analgesics: Yes  Taking medication to help with stool function: No  Does patient need any forms completed? : No  Type: Ileostomy  Person(s) caring for stoma: Self  Color: Red  Shape: Round  Projection: Protuberant  Mucutaneous Junction: Intact  Periostimal skin: Intact  Received home care WOC assessment after discharge: No        Date placed: 12/11/18  Type: Glue  Incision cleaned: Yes  Radiating: No  Progression: Improving  Description of pain: Aching  Alleviating Factors: Rest;Pain Medication  Aggravating Factors: Activity  Weakness: No         PLAN                                                      Outpatient Plan:  Follow up per scheduled follow up appointments     Future Appointments   Date Time Provider Department Center   12/21/2018  1:30 PM Eduardo Lea [facility]   12/28/2018  9:15 AM  LAB [facility]   12/28/2018 10:00 AM Mellisa Suh APRN CNP [facility]   2/1/2019  8:00 AM  LAB [facility]   2/1/2019  8:30 AM Cleo Marks NP [facility]   2/1/2019  9:30 AM U2A ROOM [address] [place] [address] O   3/29/2019  8:00 AM  LAB [facility]   3/29/2019  8:30 AM Cleo Marks NP [facility]   3/29/2019  9:30 AM A ROOM [address] [place] [address] O   6/10/2019  3:00 PM Klever Ernst MD [facility]           Sunita Dobbins RN

## 2018-12-21 ENCOUNTER — ALLIED HEALTH/NURSE VISIT (OUTPATIENT)
Dept: PHARMACY | Facility: CLINIC | Age: 63
End: 2018-12-21
Payer: COMMERCIAL

## 2018-12-21 DIAGNOSIS — N18.5 TYPE 2 DIABETES MELLITUS WITH STAGE 5 CHRONIC KIDNEY DISEASE NOT ON CHRONIC DIALYSIS, WITHOUT LONG-TERM CURRENT USE OF INSULIN (H): Primary | ICD-10-CM

## 2018-12-21 DIAGNOSIS — E11.22 TYPE 2 DIABETES MELLITUS WITH STAGE 5 CHRONIC KIDNEY DISEASE NOT ON CHRONIC DIALYSIS, WITHOUT LONG-TERM CURRENT USE OF INSULIN (H): Primary | ICD-10-CM

## 2018-12-21 PROCEDURE — 99606 MTMS BY PHARM EST 15 MIN: CPT | Performed by: PHARMACIST

## 2018-12-21 NOTE — PROGRESS NOTES
SUBJECTIVE/OBJECTIVE:                Murray Nicholson is a 63 year old male coming in for a follow-up visit for Medication Therapy Management.  He was referred to me from txp team for blood sugar monitoring.      Chief Complaint: diabetes follow up, since his ileostomy he has been fatigued, has only been checking BS one time daily.     Tobacco: No tobacco use  Alcohol: not currently using    Medication Adherence/Access:  Patient uses pill box(es).  Patient takes medications 4 time(s) per day. 7-7:30am, 12pm, 4-6pm, 7:30pm.   Per patient, misses medication 0 times per week. Has been taking Immunos at 7:30am and pm, since his labs are at 7:30am.   The patient fills medications at [place]: YES.    Diabetes:  Pt stopped NPH, was taking 20 units daily.  He has been fatigued from his ileostomy on 12/11 and has not been giving NPH or checking his blood sugars frequently.   Novolog Sliding scale   140-189: 1 unit  190-239: 2 units  240-289: 3 units  290-339: 4 units  340-399: 5 units  400-449: 6 units  Over 450: 7 units and contact me  SMBG: once daily.   Ranges (patient reported):   Date FBG/ 2hours post   12/21 97   12/20 115   12/19 92   12/18 91       Date FBG/ 2hours post Lunch/2hours post Dinner /2hours post bedtime   12/3 82 107     12/2 96 154      12/1 97 137  156    11/30 97 146 114    11/29 87 143 192  151   11/28 98  132    11/27 120      11/26 99 174  141   11/25 109 106 125    11/24 114 148 161    11/23 80 149 181 192   11/22 123   254   Patient is experiencing mild hypoglycemia in the mornings, but no sx.   Recent symptoms of high blood sugar? none  Diet/Exercise: Eating around meals a day. Spinach, potato, rotisserie chicken, Carrots dinner. Low sodium diet. Has been eating popcorn in the afternoon.   Lab Results   Component Value Date    A1C 6.1 12/03/2018    A1C 5.2 11/13/2018    A1C 7.0 07/18/2018    A1C 7.0 04/26/2018    A1C 6.3 04/04/2018     Today's Vitals: There were no vitals taken for this  visit.      ASSESSMENT:                Medication Adherence: good, no issues found    Heart Transplant:  Stable.     Diabetes: AM BG looks great, encouraged patient to start checking again in the evening. Traditionally, patient's BG has always been good in the morning and worse in the evening. Will continue holding NPH and follow up next week. A1c returned at <6.5.   PLAN:                  Pt to...  1. Start checking BG BID-TID.     I spent 5 minutes with this patient today. I offer these suggestions for consideration by txp team. A copy of the visit note was provided to the patient's txp team and PCP.     Will follow up in 2-3 weeks.    The patient denied a summary of these recommendations as an after visit summary.    Eduardo Lea, PharmD  [facility] Pharmacist    Phone: [phone]

## 2018-12-27 ENCOUNTER — PATIENT OUTREACH (OUTPATIENT)
Dept: CARE COORDINATION | Facility: CLINIC | Age: 63
End: 2018-12-27

## 2018-12-27 DIAGNOSIS — I10 HYPERTENSION GOAL BP (BLOOD PRESSURE) < 130/80: ICD-10-CM

## 2018-12-27 DIAGNOSIS — Z93.3 COLOSTOMY STATUS (H): Primary | ICD-10-CM

## 2018-12-27 RX ORDER — LISINOPRIL 10 MG/1
10 TABLET ORAL DAILY
Qty: 90 TABLET | Refills: 3 | Status: SHIPPED | OUTPATIENT
Start: 2018-12-27 | End: 2019-08-14

## 2018-12-27 NOTE — PROGRESS NOTES
Clinic Care Coordination Contact  [facility]/Voicemail    Referral Source: IP Handoff    Hospitalization 12/11-12/17/2018 S/P Laparoscopic colostomy diverting loop ileostomy    Clinical Data: Care Coordinator Outreach    Outreach attempted x 1.  Left message on voicemail with call back information and requested return call.    Plan: Care Coordinator will await a return call from the patient     Jaky Canela RN / Clinical Care Coordinator     [facility]   [email or web address]  Office :  [phone] / Fax :  [phone]

## 2018-12-28 ENCOUNTER — TELEPHONE (OUTPATIENT)
Dept: TRANSPLANT | Facility: CLINIC | Age: 63
End: 2018-12-28

## 2018-12-28 ENCOUNTER — OFFICE VISIT (OUTPATIENT)
Dept: CARDIOLOGY | Facility: CLINIC | Age: 63
End: 2018-12-28
Attending: NURSE PRACTITIONER
Payer: MEDICARE

## 2018-12-28 ENCOUNTER — OFFICE VISIT (OUTPATIENT)
Dept: PHARMACY | Facility: CLINIC | Age: 63
End: 2018-12-28
Payer: COMMERCIAL

## 2018-12-28 VITALS
HEIGHT: 69 IN | WEIGHT: 166.6 LBS | OXYGEN SATURATION: 100 % | SYSTOLIC BLOOD PRESSURE: 135 MMHG | HEART RATE: 82 BPM | DIASTOLIC BLOOD PRESSURE: 89 MMHG | BODY MASS INDEX: 24.68 KG/M2

## 2018-12-28 DIAGNOSIS — Z94.1 HEART TRANSPLANTED (H): Primary | ICD-10-CM

## 2018-12-28 DIAGNOSIS — M10.9 GOUT, UNSPECIFIED CAUSE, UNSPECIFIED CHRONICITY, UNSPECIFIED SITE: ICD-10-CM

## 2018-12-28 DIAGNOSIS — Z94.1 HEART TRANSPLANT RECIPIENT (H): ICD-10-CM

## 2018-12-28 DIAGNOSIS — D72.819 LEUKOPENIA, UNSPECIFIED TYPE: ICD-10-CM

## 2018-12-28 DIAGNOSIS — E11.22 TYPE 2 DIABETES MELLITUS WITH STAGE 5 CHRONIC KIDNEY DISEASE NOT ON CHRONIC DIALYSIS, WITHOUT LONG-TERM CURRENT USE OF INSULIN (H): Primary | ICD-10-CM

## 2018-12-28 DIAGNOSIS — I10 ESSENTIAL HYPERTENSION: ICD-10-CM

## 2018-12-28 DIAGNOSIS — N18.5 TYPE 2 DIABETES MELLITUS WITH STAGE 5 CHRONIC KIDNEY DISEASE NOT ON CHRONIC DIALYSIS, WITHOUT LONG-TERM CURRENT USE OF INSULIN (H): Primary | ICD-10-CM

## 2018-12-28 DIAGNOSIS — M1A.0390 CHRONIC GOUT OF WRIST, UNSPECIFIED CAUSE, UNSPECIFIED LATERALITY: ICD-10-CM

## 2018-12-28 DIAGNOSIS — Z94.1 HEART REPLACED BY TRANSPLANT (H): ICD-10-CM

## 2018-12-28 LAB
ACANTHOCYTES BLD QL SMEAR: SLIGHT
ALP SERPL-CCNC: 203 U/L (ref 40–150)
ALT SERPL W P-5'-P-CCNC: 16 U/L (ref 0–70)
ANION GAP SERPL CALCULATED.3IONS-SCNC: 6 MMOL/L (ref 3–14)
ANISOCYTOSIS BLD QL SMEAR: ABNORMAL
AST SERPL W P-5'-P-CCNC: 18 U/L (ref 0–45)
BASOPHILS # BLD AUTO: 0 10E9/L (ref 0–0.2)
BASOPHILS NFR BLD AUTO: 0.9 %
BILIRUB SERPL-MCNC: 0.5 MG/DL (ref 0.2–1.3)
BUN SERPL-MCNC: 18 MG/DL (ref 7–30)
BURR CELLS BLD QL SMEAR: SLIGHT
CALCIUM SERPL-MCNC: 8.8 MG/DL (ref 8.5–10.1)
CHLORIDE SERPL-SCNC: 104 MMOL/L (ref 94–109)
CO2 SERPL-SCNC: 25 MMOL/L (ref 20–32)
CREAT SERPL-MCNC: 4.22 MG/DL (ref 0.66–1.25)
CRP SERPL-MCNC: 4.3 MG/L (ref 0–8)
DIFFERENTIAL METHOD BLD: ABNORMAL
EOSINOPHIL # BLD AUTO: 0.2 10E9/L (ref 0–0.7)
EOSINOPHIL NFR BLD AUTO: 6.3 %
ERYTHROCYTE [DISTWIDTH] IN BLOOD BY AUTOMATED COUNT: 20 % (ref 10–15)
GFR SERPL CREATININE-BSD FRML MDRD: 14 ML/MIN/{1.73_M2}
GLUCOSE SERPL-MCNC: 141 MG/DL (ref 70–99)
HCT VFR BLD AUTO: 32.8 % (ref 40–53)
HGB BLD-MCNC: 9.9 G/DL (ref 13.3–17.7)
LYMPHOCYTES # BLD AUTO: 0.9 10E9/L (ref 0.8–5.3)
LYMPHOCYTES NFR BLD AUTO: 27 %
MACROCYTES BLD QL SMEAR: PRESENT
MAGNESIUM SERPL-MCNC: 1.4 MG/DL (ref 1.6–2.3)
MCH RBC QN AUTO: 28.1 PG (ref 26.5–33)
MCHC RBC AUTO-ENTMCNC: 30.2 G/DL (ref 31.5–36.5)
MCV RBC AUTO: 93 FL (ref 78–100)
MONOCYTES # BLD AUTO: 0.4 10E9/L (ref 0–1.3)
MONOCYTES NFR BLD AUTO: 12.6 %
MYELOCYTES # BLD: 0 10E9/L
MYELOCYTES NFR BLD MANUAL: 0.9 %
NEUTROPHILS # BLD AUTO: 1.8 10E9/L (ref 1.6–8.3)
NEUTROPHILS NFR BLD AUTO: 52.3 %
NRBC # BLD AUTO: 0 10*3/UL
NRBC BLD AUTO-RTO: 1 /100
OVALOCYTES BLD QL SMEAR: SLIGHT
PHOSPHATE SERPL-MCNC: 1.5 MG/DL (ref 2.5–4.5)
PLATELET # BLD AUTO: 311 10E9/L (ref 150–450)
PLATELET # BLD EST: ABNORMAL 10*3/UL
POIKILOCYTOSIS BLD QL SMEAR: SLIGHT
POTASSIUM SERPL-SCNC: 4.1 MMOL/L (ref 3.4–5.3)
RBC # BLD AUTO: 3.52 10E12/L (ref 4.4–5.9)
RBC AGGLUT BLD QL: PRESENT
SODIUM SERPL-SCNC: 135 MMOL/L (ref 133–144)
TACROLIMUS BLD-MCNC: 3.2 UG/L (ref 5–15)
TME LAST DOSE: ABNORMAL H
URATE SERPL-MCNC: 3.5 MG/DL (ref 3.5–7.2)
WBC # BLD AUTO: 3.5 10E9/L (ref 4–11)

## 2018-12-28 PROCEDURE — 83735 ASSAY OF MAGNESIUM: CPT | Performed by: STUDENT IN AN ORGANIZED HEALTH CARE EDUCATION/TRAINING PROGRAM

## 2018-12-28 PROCEDURE — 80048 BASIC METABOLIC PNL TOTAL CA: CPT | Performed by: STUDENT IN AN ORGANIZED HEALTH CARE EDUCATION/TRAINING PROGRAM

## 2018-12-28 PROCEDURE — 82247 BILIRUBIN TOTAL: CPT | Performed by: STUDENT IN AN ORGANIZED HEALTH CARE EDUCATION/TRAINING PROGRAM

## 2018-12-28 PROCEDURE — 84100 ASSAY OF PHOSPHORUS: CPT | Performed by: STUDENT IN AN ORGANIZED HEALTH CARE EDUCATION/TRAINING PROGRAM

## 2018-12-28 PROCEDURE — 84450 TRANSFERASE (AST) (SGOT): CPT | Performed by: STUDENT IN AN ORGANIZED HEALTH CARE EDUCATION/TRAINING PROGRAM

## 2018-12-28 PROCEDURE — G0463 HOSPITAL OUTPT CLINIC VISIT: HCPCS | Mod: ZF

## 2018-12-28 PROCEDURE — 84075 ASSAY ALKALINE PHOSPHATASE: CPT | Performed by: STUDENT IN AN ORGANIZED HEALTH CARE EDUCATION/TRAINING PROGRAM

## 2018-12-28 PROCEDURE — 99606 MTMS BY PHARM EST 15 MIN: CPT | Performed by: PHARMACIST

## 2018-12-28 PROCEDURE — 86140 C-REACTIVE PROTEIN: CPT | Performed by: STUDENT IN AN ORGANIZED HEALTH CARE EDUCATION/TRAINING PROGRAM

## 2018-12-28 PROCEDURE — 99214 OFFICE O/P EST MOD 30 MIN: CPT | Mod: ZP | Performed by: NURSE PRACTITIONER

## 2018-12-28 PROCEDURE — 84550 ASSAY OF BLOOD/URIC ACID: CPT | Performed by: STUDENT IN AN ORGANIZED HEALTH CARE EDUCATION/TRAINING PROGRAM

## 2018-12-28 PROCEDURE — 84460 ALANINE AMINO (ALT) (SGPT): CPT | Performed by: STUDENT IN AN ORGANIZED HEALTH CARE EDUCATION/TRAINING PROGRAM

## 2018-12-28 PROCEDURE — 36415 COLL VENOUS BLD VENIPUNCTURE: CPT | Performed by: STUDENT IN AN ORGANIZED HEALTH CARE EDUCATION/TRAINING PROGRAM

## 2018-12-28 PROCEDURE — 85025 COMPLETE CBC W/AUTO DIFF WBC: CPT | Performed by: STUDENT IN AN ORGANIZED HEALTH CARE EDUCATION/TRAINING PROGRAM

## 2018-12-28 PROCEDURE — 80197 ASSAY OF TACROLIMUS: CPT | Performed by: NURSE PRACTITIONER

## 2018-12-28 RX ORDER — ASPIRIN 81 MG/1
81 TABLET ORAL EVERY EVENING
Status: ON HOLD | COMMUNITY
End: 2019-05-08

## 2018-12-28 RX ORDER — TACROLIMUS 1 MG/1
CAPSULE ORAL
Qty: 90 CAPSULE | Refills: 11 | Status: SHIPPED | OUTPATIENT
Start: 2018-12-28 | End: 2019-02-12

## 2018-12-28 ASSESSMENT — PAIN SCALES - GENERAL: PAINLEVEL: NO PAIN (0)

## 2018-12-28 ASSESSMENT — MIFFLIN-ST. JEOR: SCORE: 1541.07

## 2018-12-28 NOTE — NURSING NOTE
Chief Complaint   Patient presents with     Follow Up     heart tx f/u     Vitals were taken and medications were reconciled.    Devang Santiago    10:19 AM

## 2018-12-28 NOTE — PROGRESS NOTES
SUBJECTIVE/OBJECTIVE:                Murray Nicholson is a 63 year old male coming in for a follow-up visit for Medication Therapy Management.  He was referred to me from txp team for blood sugar monitoring.      Chief Complaint: diabetes follow up, low phos level today.     Tobacco: No tobacco use  Alcohol: not currently using    Medication Adherence/Access:  Patient uses pill box(es).  Patient takes medications 4 time(s) per day. 7-7:30am, 12pm, 4-6pm, 7:30pm.   Per patient, misses medication 0 times per week. Has been taking Immunos at 7:30am and pm, since his labs are at 7:30am.    The patient fills medications at [facility]: YES.    Diabetes:  Pt stopped NPH mostly, uses it sometimes, using Novolog sliding scale as needed. Stopped Prednisone 5mg today.  He has been fatigued from his ileostomy on 12/1.  SMBG: once daily to twice.   Ranges (patient reported):   Date FBG/ 2hours post Lunch/2hours post Dinner /2hours post   12/28 116     12/27 (no NPH) 105 153 /168   12/26 104     12/25 125 160 (gave 15 of NPH) /154   12/24 138     12/23 98  187   12/22 115     12/21 97  /175     Date FBG/ 2hours post   12/21 97   12/20 115   12/19 92   12/18 91   Patient is experiencing no hypoglycemia  Recent symptoms of high blood sugar? none  Diet/Exercise: Eating around meals a day. Spinach, potato, rotisserie chicken, Carrots dinner. Low sodium diet. Has been eating popcorn in the afternoon.   Working on elliptical 3x weekly 15-20 minutes  Lab Results   Component Value Date    A1C 6.1 12/03/2018    A1C 5.2 11/13/2018    A1C 7.0 07/18/2018    A1C 7.0 04/26/2018    A1C 6.3 04/04/2018     Hypophosphatemia: pt had quite a low phosphorous level today, unclear cause. Prior levels have been normal. May need a redraw, but should be managed by dialysis long term.      Ref. Range 12/16/2018 06:09 12/17/2018 06:06 12/18/2018 08:49 12/28/2018 09:38   Phosphorus Latest Ref Range: 2.5 - 4.5 mg/dL 3.1 3.5 3.3 1.5 (L)     Today's Vitals: There  were no vitals taken for this visit.      ASSESSMENT:                Medication Adherence: good, no issues found    Diabetes: Last A1c was tightly controlled at <6.5%. Pt is stopping prednisone today and on days he did not take NPH, blood sugars looked reasonable. Will continue to hold NPH, pt to call if BG increase substantially.   Hypophosphatemia: Unclear cause for low phos, gave patient a list high phos foods, which typically patients have to avoid on dialysis. Gave instructed to follow up with dialysis team concerning this at his next session.   PLAN:                  Pt to...  1. Hold NPH  2. Call if fasting sugars at >150 and 2 hours post meal sugars are >180.   3. Follow up with dialysis team concerning low phos level.     I spent 15 minutes with this patient today. I offer these suggestions for consideration by txp team. A copy of the visit note was provided to the patient's txp team and PCP.     Will follow up in 3 months prn.    The patient was given a summary of these recommendations as an after visit summary.    Eduardo Lea, PharmD  [facility] Pharmacist    Phone: [phone]

## 2018-12-28 NOTE — NURSING NOTE
"Transplant Coordinator Note  Reason for visit: 7 month follow up  Coordinator: Present Lillie Ulloa  Caregiver:  LUIS      Health concerns addressed today:  Pt seen in clinic with LEWIS Suh for 7 month follow up. NP reviewed available labs. Reports reports doing well, \"up and down the stairs at home\", using his elliptical. Roof of mouth cont to improve; cont to use josefina. BPs average 130s; renal just increased Lisinopril - pt will cont to track BPs and update coordinator.      Immunosuppressants:  MMF 1000 mg BID  FK 1 mg BID (goal 6-8) -> level pending  Pred 5 mg-> discontinue since FK level within goal      Preventive care:  Bactrim - dc'd with Pred   CMV - check prn (mismatch)  Josefina for thrush prevention while mouth is healing     Labs: Reviewed      Medication record reviewed and reconciled  Questions and concerns addressed. AVS reviewed; copy provided.    Pt verbalized an understanding of plan of care.      Patient Instructions  ~Coordinator will call with Prograf level  ~Discontinue Prednisone and Bactrim    ~Check in with Lillie in ~10 days with BP numbers  ~Discuss phosphorus level with Eduardo Lea  ~Try to do elliptical 3x/week ~20 mintues  ~Return to clinic Feb 1 as scheduled for month 8 follow up    "

## 2018-12-28 NOTE — LETTER
12/28/2018      RE: Murray Nicholson  [address]  [place] [address]       Dear Colleague,    Thank you for the opportunity to participate in the care of your patient, Murray Nicholson, at the [facility]. Please see a copy of my visit note below.        Please do [facility] CL[facility]    HPI:   Mr. Nicholson is a 63 year old male s/p recent orthotopic heart transplant who presents to clinic today for routine follow-up. Patient has history of systolic heart failure 2/2 NICM, CKD on HD, HTN, dyslipidemia, DM2. He was admitted 4/16/2018 for expediated workup for LVAD/heart/kidney transplant and was started on inotropes. He was listed for both heart/kidney transplant but ultimately underwent solo orthotopic heart transplant on 6/14/18. He had a very complex postoperative course. His current problem list includes:      1.  Status post orthotopic heart transplantation.   2.  Neutropenia.   3.  Oral mucosal ulcer secondary to neutropenia with Klebsiella infection.   4.  Pseudomonas bacteremia, resolved.   5.  Perforation of the small bowel, status post small-bowel resection and ileostomy.   6.  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   7.  Hypertension.   8.  Hyperlipidemia.   9.  End-stage renal disease requiring hemodialysis.   10. Prior RIJ thrombus infected with CoNS    Biospies have been negative for rejection. Last RHC showed normal cardiac output and low filling pressures, last echo with normal graft function. Baseline angiogram w/ donor coronary disease in all three coronaries arteries including 40% mLAD lesion and 80% OM lesion.     Since last visit, patient reports feeling well. No new complaints or concerns. He has had a diverting loop ileostomy procedure since last visit and this surgery went well without complication, extensive adhesions noted. He has been exercising with an elliptical and denies any shortness of breath, chest pain, lightheadedness with  this. He continues to tolerate HD and denies orthopnea, PND, LE edema. Weight is stable  He denies recent fever, chills, n/v, HA, rashes, night sweats, joint pains. Mouth sore he feels is healing. BP at home 130-150/90s mostly.     PAST MEDICAL HISTORY:  Past Medical History:   Diagnosis Date     (HFpEF) heart failure with preserved ejection fraction (H)      Allergic rhinitis, cause unspecified      Anemia of chronic kidney failure      AS (aortic stenosis)      Ascending aortic aneurysm (H)      Bicuspid aortic valve      CAD (coronary artery disease)      Chronic kidney disease, stage 5 (H)      Congestive heart failure, unspecified      Dialysis patient (H)     Tues-Thur-Sat     Dyslipidemia      Esophageal reflux      ESRD (end stage renal disease) (H)      Hearing problem      Hypersomnia with sleep apnea, unspecified      Hypertension      Immunosuppression (H)      Kidney problem      MGUS (monoclonal gammopathy of unknown significance)      Mitral regurgitation      SHEELA (obstructive sleep apnea)     No CPAP     Pneumonia      Systolic heart failure (H)      Type 2 diabetes mellitus (H)        FAMILY HISTORY:  Family History   Problem Relation Age of Onset     C.A.D. Father          from-never knew father-age 60     Diabetes Father      Cerebrovascular Disease Father      Hypertension Father      Hypertension No family hx of      Breast Cancer No family hx of      Cancer - colorectal No family hx of      Prostate Cancer No family hx of      Kidney Disease No family hx of      Melanoma No family hx of      Skin Cancer No family hx of        SOCIAL HISTORY:  , not currently working    CURRENT MEDICATIONS:    Current Outpatient Medications on File Prior to Visit:  albuterol (PROAIR HFA/PROVENTIL HFA/VENTOLIN HFA) 108 (90 Base) MCG/ACT inhaler Inhale 2 puffs into the lungs every 4 hours as needed for shortness of breath / dyspnea or wheezing   amLODIPine (NORVASC) 10 MG tablet Take 1 tablet (10 mg)  by mouth daily   atorvastatin (LIPITOR) 40 MG tablet Take 1 tablet (40 mg) by mouth daily (Patient taking differently: Take 40 mg by mouth daily Takes in the afternoon.)   biotin (BIOTIN 5000) 5 MG CAPS Take 5 mg by mouth daily   blood glucose monitoring (ACCU-CHEK FASTCLIX) lancets Use to test blood sugar 2-3 times daily or as directed.  Ok to substitute alternative if insurance prefers.   blood glucose monitoring (NO BRAND SPECIFIED) test strip Use to test blood sugar 2-3 times daily or as directed.   chlorhexidine (PERIDEX) 0.12 % solution Swish and spit 15 mLs in mouth 2 times daily   cloNIDine (CATAPRES) 0.1 MG tablet Take 1 tablet (0.1 mg) by mouth At Bedtime   clotrimazole 10 MG kalpana Place 1 Kalpana (10 mg) inside cheek 4 times daily   fluticasone (FLONASE) 50 MCG/ACT spray Spray 1-2 sprays into both nostrils daily   gentian violet 1 % solution Take 0.5 mLs by mouth 4 times daily   hydrALAZINE (APRESOLINE) 100 MG TABS tablet Take 1 tablet (100 mg) by mouth every 8 hours   insulin aspart (NOVOLOG PEN) 100 UNIT/ML injection Inject 1-7 Units Subcutaneous 4 times daily (before meals and nightly)   insulin isophane human (HUMULIN N PEN) 100 UNIT/ML injection Inject 16 Units Subcutaneous every morning (before breakfast)   insulin isophane human (HUMULIN N PEN) 100 UNIT/ML injection Inject 12 Units Subcutaneous daily (with dinner)   lisinopril (PRINIVIL/ZESTRIL) 10 MG tablet Take 1 tablet (10 mg) by mouth daily   [] loperamide (IMODIUM A-D) 2 MG tablet Take 1 tablet (2 mg) by mouth 4 times daily as needed for diarrhea   loratadine (CLARITIN) 10 MG tablet Take 10 mg by mouth daily Reported on 5/3/2017   melatonin 1 MG TABS tablet Take 2 tablets (2 mg) by mouth nightly as needed for sleep   mycophenolate (GENERIC EQUIVALENT) 250 MG capsule Take 4 capsules (1,000 mg) by mouth 2 times daily   NEPHROCAPS 1 MG capsule Take 1 capsule by mouth daily   ondansetron (ZOFRAN) 4 MG tablet Take 1 tablet (4 mg) by mouth  "every 6 hours as needed for nausea While taking neomycin and flagyl.   order for DME Coloplast       1 piece convex light Marion cut up to 1  \" with filter #[id number]     OrHollister         1 piece soft convex 2 1/8\" #[id number]  Accessories   2\" barrier ring #[id number]                        Adapt paste #[id number]                        Adapt powder #[id number]                        No sting film barrier # [id number]                        Brava elastic barrier strips curved # [id number] Diagnosis: New ileostomy   pantoprazole (PROTONIX) 40 MG EC tablet Take 1 tablet (40 mg) by mouth 2 times daily (before meals)   pentamidine (NEBUPENT) 300 MG neb solution Inhale 300 mg into the lungs every 28 days Last given 9/19/18   predniSONE (DELTASONE) 5 MG tablet Take 1 tablet (5 mg) by mouth daily   simethicone (MYLICON) 80 MG chewable tablet Take 1 tablet (80 mg) by mouth every 6 hours as needed for cramping   sulfamethoxazole-trimethoprim (BACTRIM/SEPTRA) 400-80 MG per tablet Once per day on Tuesday, Thursday and Saturday. Take after dialysis on dialysis days.   tacrolimus (GENERIC EQUIVALENT) 1 MG capsule Take 1 capsule (1 mg) by mouth 2 times daily   triamcinolone (KENALOG) 0.1 % ointment Apply topically 2 times daily   Urea 40 % CREA Externally apply topically daily     No current facility-administered medications on file prior to visit.     ROS:  CONSTITUTIONAL: See HPI  HEENT: Denies HA  CV: See HPI  PULMONARY: See HPI  GI: See HPI  : Denies urinary alterations, dysuria, urinary frequency, hematuria, and abnormal drainage.   EXT: Denies lower extremity edema or color changes.   SKIN: Denies abnormal rashes or lesions.   MUSCULOSKELETAL: Denies upper or lower extremity weakness and pain.   NEUROLOGIC: Denies lightheadedness, dizziness, seizures, or upper or lower extremity paresthesia.     EXAM:  /89 (BP Location: Left arm, Patient Position: Chair, Cuff Size: Adult Regular)   Pulse 82   Ht 1.753 m (5' 9\")   Wt 75.6 kg (166 lb 9.6 " oz)   SpO2 100%   BMI 24.60 kg/m        GENERAL: Appears comfortable, in no acute distress.   HEENT: Eye symmetrical, no discharge or icterus bilaterally. Mucous membranes moist. Healing mouth sore of roof of mouth, slough present, no necrosis noted.   CV: RRR, +S1S2, no murmur, rub, or gallop. JVP not viisble at  45 degrees.   RESPIRATORY: Respirations regular, even, and unlabored. Lungs CTA throughout.   GI: Soft and non distended with normoactive bowel sounds present in all quadrants. No tenderness, rebound, guarding. No hepatomegaly.   EXTREMITIES: No peripheral edema. 2+ bilateral pedal pulses.   NEUROLOGIC: Alert and oriented x 3. No focal deficits.   MUSCULOSKELETAL: No joint swelling or tenderness.   SKIN: No jaundice. No rashes or lesions.     Labs - reviewed with patient in clinic today:  CBC RESULTS:  Lab Results   Component Value Date    WBC 3.5 (L) 12/28/2018    RBC 3.52 (L) 12/28/2018    HGB 9.9 (L) 12/28/2018    HCT 32.8 (L) 12/28/2018    MCV 93 12/28/2018    MCH 28.1 12/28/2018    MCHC 30.2 (L) 12/28/2018    RDW 20.0 (H) 12/28/2018     12/28/2018       CMP RESULTS:  Lab Results   Component Value Date     12/28/2018    POTASSIUM 4.1 12/28/2018    CHLORIDE 104 12/28/2018    CO2 25 12/28/2018    ANIONGAP 6 12/28/2018     (H) 12/28/2018    BUN 18 12/28/2018    CR 4.22 (H) 12/28/2018    GFRESTIMATED 14 (L) 12/28/2018    GFRESTBLACK 16 (L) 12/28/2018    TRINI 8.8 12/28/2018    BILITOTAL 0.5 12/28/2018    ALBUMIN 2.6 (L) 12/15/2018    ALKPHOS 203 (H) 12/28/2018    ALT 16 12/28/2018    AST 18 12/28/2018        INR RESULTS:  Lab Results   Component Value Date    INR 1.05 09/10/2018       LIPID RESULTS:  Lab Results   Component Value Date    CHOL 154 12/03/2018    HDL 65 12/03/2018    LDL 49 12/03/2018    TRIG 201 (H) 12/03/2018    CHOLHDLRATIO 5.0 08/10/2015       IMMUNOSUPPRESSANT LEVELS:  Lab Results   Component Value Date    TACROL 3.2 (L) 12/28/2018    DOSTAC 12/27/2018 21:30  12/28/2018       No components found for: CK  Lab Results   Component Value Date    MAG 1.6 12/18/2018     Lab Results   Component Value Date    A1C 6.1 (H) 12/03/2018     Lab Results   Component Value Date    PHOS 3.3 12/18/2018     Lab Results   Component Value Date    NTBNP 15,996 (H) 11/08/2016     Lab Results   Component Value Date    SAITESTMET [facility] 11/13/2018    SAICELL Class I 11/13/2018    [id number] None 11/13/2018    [id number] None 11/13/2018    SAIREPCOM  11/13/2018      Test performed by modified procedure. Serum heat inactivated and tested   by a modified ([place]) protocol including fetal calf serum addition.   High-risk, mfi >3,000. Mod-risk, mfi 500-3,000.       Lab Results   Component Value Date    SAIITESTME [facility] 11/13/2018    SAIICELL Class II 11/13/2018    [id number] None 11/13/2018    [id number] None 11/13/2018    SAIIREPCOM  11/13/2018      Test performed by modified procedure. Serum heat inactivated and tested   by a modified ([place]) protocol including fetal calf serum addition.   High-risk, mfi >3,000. Mod-risk, mfi 500-3,000.       Lab Results   Component Value Date    CSPEC Plasma, EDTA anticoagulant 12/18/2018       Diagnostic Studies:  TTE 12/3/18  Global and regional left ventricular function is hyperkinetic with an EF of  65-70%.  Right ventricular function, chamber size, wall motion, and thickness are  normal.  No significant valvular abnormalities.  The peak velocity of the tricuspid regurgitant jet is not obtainable.  The inferior vena cava was normal in size with preserved respiratory  variability.  No pericardial effusion is present.    Cor angiogram 7/18/18    RHC 12/3/18      Assessment/Plan:  Mr. Nicholson is a 63 year old male who is s/p orthotopic heart transplant on 6/14/18 who presents to clinic for follow-up.. Post-op course was complicated leukocytosis for which he received abx, RIJ thrombus infected with CoNS, incidental pneumoperitoneum, necrotic mouth sore with  klebsiella and pseudomonas bacteremia, and perforation of the small bowel, status post small-bowel resection and ileostomy. He remains on HD for ESRD. From a cardiac standpoint, he has been stable with normal graft function. He is tolerating current doses of IS. Now that prograf is therapeutic, we are able to discontinue prednisone (per protocol).     # Status post OHT on 6/14/18, history of NICM  # Donor 3 vessel CAD  # Chronic immunosuppression  * Rejection history: none.   * Recent immunosuppression changes: increases FK to stay within goal  * Last biopsy: 12/3/18 negative  * Intolerance to medications: none    Immunosuppression:  - Tacrolimus. Trough level goal 6-8  - Mycophenolate 1000 mg bid  - discontinue prednisone today now that FK within goal    Prophylaxis:  - PPI: pantoprazole 40 mg continue for now  - CAV: ASA 81 mg and atorvastatin 40 mg  - CMV high risk D+ R-: Valcyte d/c'ed due to leukopenia, now off per protocol (>6 mos)  - PCP: ok to discontinue Bactrim now that off pred  - Thrush: continue clotrimazole for now given open sore    # HTN slightly above goal, he will call coordinator with readings in about 10 days, continue on hydralazine 100 mg q8hr, lisinopril 10 mg daily, clonidine 0.1 mg at HS, amlodipine 10 mg daily - discuss any changes with renal  # Low phosphorus coordinator to discuss replacement w pharmD but with ESRD will likely just recheck, not on supplement  # Hx perforated bowel s/p colostomy stable  # Hx klebsiella mouth ulcer, resolving  # Hx pseudomonas bacteremia, resolved   # ESRD listed for renal transplant, on HD TTS, EDW 76 kg      25 minutes spent face-to-face with patient, >50% in counseling and/or coordination of care as described above      Ramya Suh, FRANK, NP-C  12/28/2018

## 2018-12-28 NOTE — PATIENT INSTRUCTIONS
Recommendations from today's MTM visit:                                                      1. Lets hold NPH for now. If your blood sugars before meals are consistently over 90-150mg/dL or 180mg/dL 2 hours after meal give me a call.     2. Sources of phosphorus: poultry, pork, diary, processed cheese/ meats, eggs, dark chocolate, yogurt.     Next MTM visit: 3/29/19 at 1PM    To schedule another MTM appointment, please call the clinic directly or you may call the MTM scheduling line at [phone] or toll-free at [phone].     My Clinical Pharmacist's contact information:                                                      It was a pleasure talking with you today!  Please feel free to contact me with any questions or concerns you have.      Eduardo Lea, PharmD  [facility] Pharmacist    Phone: [phone]     You may receive a survey about the MTM services you received.  I would appreciate your feedback to help me serve you better in the future. Please fill it out and return it when you can. Your comments will be anonymous.

## 2018-12-28 NOTE — TELEPHONE ENCOUNTER
Pt called with 3.2 FK level. Dose increase from 1 mg BID to 2/1.   Recheck 1/9/18    Pt verbalized understanding of next steps.

## 2018-12-28 NOTE — PROGRESS NOTES
[facility]    HPI:   Mr. Nicholson is a 63 year old male s/p recent orthotopic heart transplant who presents to clinic today for routine follow-up. Patient has history of systolic heart failure 2/2 NICM, CKD on HD, HTN, dyslipidemia, DM2. He was admitted 4/16/2018 for expediated workup for LVAD/heart/kidney transplant and was started on inotropes. He was listed for both heart/kidney transplant but ultimately underwent solo orthotopic heart transplant on 6/14/18. He had a very complex postoperative course. His current problem list includes:      1.  Status post orthotopic heart transplantation.   2.  Neutropenia.   3.  Oral mucosal ulcer secondary to neutropenia with Klebsiella infection.   4.  Pseudomonas bacteremia, resolved.   5.  Perforation of the small bowel, status post small-bowel resection and ileostomy.   6.  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   7.  Hypertension.   8.  Hyperlipidemia.   9.  End-stage renal disease requiring hemodialysis.   10. Prior RIJ thrombus infected with CoNS    Biospies have been negative for rejection. Last RHC showed normal cardiac output and low filling pressures, last echo with normal graft function. Baseline angiogram w/ donor coronary disease in all three coronaries arteries including 40% mLAD lesion and 80% OM lesion.     Since last visit, patient reports feeling well. No new complaints or concerns. He has had a diverting loop ileostomy procedure since last visit and this surgery went well without complication, extensive adhesions noted. He has been exercising with an elliptical and denies any shortness of breath, chest pain, lightheadedness with this. He continues to tolerate HD and denies orthopnea, PND, LE edema. Weight is stable  He denies recent fever, chills, n/v, HA, rashes, night sweats, joint pains. Mouth sore he feels is healing. BP at home 130-150/90s mostly.     PAST MEDICAL HISTORY:  Past Medical History:   Diagnosis Date     (HFpEF) heart failure with  preserved ejection fraction (H)      Allergic rhinitis, cause unspecified      Anemia of chronic kidney failure      AS (aortic stenosis)      Ascending aortic aneurysm (H)      Bicuspid aortic valve      CAD (coronary artery disease)      Chronic kidney disease, stage 5 (H)      Congestive heart failure, unspecified      Dialysis patient (H)     [facility]     Dyslipidemia      Esophageal reflux      ESRD (end stage renal disease) (H)      Hearing problem      Hypersomnia with sleep apnea, unspecified      Hypertension      Immunosuppression (H)      Kidney problem      MGUS (monoclonal gammopathy of unknown significance)      Mitral regurgitation      SHEELA (obstructive sleep apnea)     No CPAP     Pneumonia      Systolic heart failure (H)      Type 2 diabetes mellitus (H)        FAMILY HISTORY:  Family History   Problem Relation Age of Onset     C.A.D. Father          from-never knew father-age 60     Diabetes Father      Cerebrovascular Disease Father      Hypertension Father      Hypertension No family hx of      Breast Cancer No family hx of      Cancer - colorectal No family hx of      Prostate Cancer No family hx of      Kidney Disease No family hx of      Melanoma No family hx of      Skin Cancer No family hx of        SOCIAL HISTORY:  , not currently working    CURRENT MEDICATIONS:    Current Outpatient Medications on File Prior to Visit:  albuterol (PROAIR HFA/PROVENTIL HFA/VENTOLIN HFA) 108 (90 Base) MCG/ACT inhaler Inhale 2 puffs into the lungs every 4 hours as needed for shortness of breath / dyspnea or wheezing   amLODIPine (NORVASC) 10 MG tablet Take 1 tablet (10 mg) by mouth daily   atorvastatin (LIPITOR) 40 MG tablet Take 1 tablet (40 mg) by mouth daily (Patient taking differently: Take 40 mg by mouth daily Takes in the afternoon.)   biotin (BIOTIN 5000) 5 MG CAPS Take 5 mg by mouth daily   blood glucose monitoring (ACCU-CHEK FASTCLIX) lancets Use to test blood sugar 2-3 times daily or  "as directed.  Ok to substitute alternative if insurance prefers.   blood glucose monitoring (NO BRAND SPECIFIED) test strip Use to test blood sugar 2-3 times daily or as directed.   chlorhexidine (PERIDEX) 0.12 % solution Swish and spit 15 mLs in mouth 2 times daily   cloNIDine (CATAPRES) 0.1 MG tablet Take 1 tablet (0.1 mg) by mouth At Bedtime   clotrimazole 10 MG kalpana Place 1 Kalpana (10 mg) inside cheek 4 times daily   fluticasone (FLONASE) 50 MCG/ACT spray Spray 1-2 sprays into both nostrils daily   gentian violet 1 % solution Take 0.5 mLs by mouth 4 times daily   hydrALAZINE (APRESOLINE) 100 MG TABS tablet Take 1 tablet (100 mg) by mouth every 8 hours   insulin aspart (NOVOLOG PEN) 100 UNIT/ML injection Inject 1-7 Units Subcutaneous 4 times daily (before meals and nightly)   insulin isophane human (HUMULIN N PEN) 100 UNIT/ML injection Inject 16 Units Subcutaneous every morning (before breakfast)   insulin isophane human (HUMULIN N PEN) 100 UNIT/ML injection Inject 12 Units Subcutaneous daily (with dinner)   lisinopril (PRINIVIL/ZESTRIL) 10 MG tablet Take 1 tablet (10 mg) by mouth daily   [] loperamide (IMODIUM A-D) 2 MG tablet Take 1 tablet (2 mg) by mouth 4 times daily as needed for diarrhea   loratadine (CLARITIN) 10 MG tablet Take 10 mg by mouth daily Reported on 5/3/2017   melatonin 1 MG TABS tablet Take 2 tablets (2 mg) by mouth nightly as needed for sleep   mycophenolate (GENERIC EQUIVALENT) 250 MG capsule Take 4 capsules (1,000 mg) by mouth 2 times daily   NEPHROCAPS 1 MG capsule Take 1 capsule by mouth daily   ondansetron (ZOFRAN) 4 MG tablet Take 1 tablet (4 mg) by mouth every 6 hours as needed for nausea While taking neomycin and flagyl.   order for DME [employer]       1 piece convex light Uriel cut up to 1  \" with filter #[id number]     OrHollister         1 piece soft convex 2 \" #[id number]  Accessories   2\" barrier ring #[id number]                        Adapt paste #[id number]                        Adapt " "powder #7906                        No sting film barrier # [id number]                        Brava elastic barrier strips curved # [id number] Diagnosis: New ileostomy   pantoprazole (PROTONIX) 40 MG EC tablet Take 1 tablet (40 mg) by mouth 2 times daily (before meals)   pentamidine (NEBUPENT) 300 MG neb solution Inhale 300 mg into the lungs every 28 days Last given 9/19/18   predniSONE (DELTASONE) 5 MG tablet Take 1 tablet (5 mg) by mouth daily   simethicone (MYLICON) 80 MG chewable tablet Take 1 tablet (80 mg) by mouth every 6 hours as needed for cramping   sulfamethoxazole-trimethoprim (BACTRIM/SEPTRA) 400-80 MG per tablet Once per day on Tuesday, Thursday and Saturday. Take after dialysis on dialysis days.   tacrolimus (GENERIC EQUIVALENT) 1 MG capsule Take 1 capsule (1 mg) by mouth 2 times daily   triamcinolone (KENALOG) 0.1 % ointment Apply topically 2 times daily   Urea 40 % CREA Externally apply topically daily     No current facility-administered medications on file prior to visit.     ROS:  CONSTITUTIONAL: See HPI  HEENT: Denies HA  CV: See HPI  PULMONARY: See HPI  GI: See HPI  : Denies urinary alterations, dysuria, urinary frequency, hematuria, and abnormal drainage.   EXT: Denies lower extremity edema or color changes.   SKIN: Denies abnormal rashes or lesions.   MUSCULOSKELETAL: Denies upper or lower extremity weakness and pain.   NEUROLOGIC: Denies lightheadedness, dizziness, seizures, or upper or lower extremity paresthesia.     EXAM:  /89 (BP Location: Left arm, Patient Position: Chair, Cuff Size: Adult Regular)   Pulse 82   Ht 1.753 m (5' 9\")   Wt 75.6 kg (166 lb 9.6 oz)   SpO2 100%   BMI 24.60 kg/m       GENERAL: Appears comfortable, in no acute distress.   HEENT: Eye symmetrical, no discharge or icterus bilaterally. Mucous membranes moist. Healing mouth sore of roof of mouth, slough present, no necrosis noted.   CV: RRR, +S1S2, no murmur, rub, or gallop. JVP not viisble at  45 degrees. "   RESPIRATORY: Respirations regular, even, and unlabored. Lungs CTA throughout.   GI: Soft and non distended with normoactive bowel sounds present in all quadrants. No tenderness, rebound, guarding. No hepatomegaly.   EXTREMITIES: No peripheral edema. 2+ bilateral pedal pulses.   NEUROLOGIC: Alert and oriented x 3. No focal deficits.   MUSCULOSKELETAL: No joint swelling or tenderness.   SKIN: No jaundice. No rashes or lesions.     Labs - reviewed with patient in clinic today:  CBC RESULTS:  Lab Results   Component Value Date    WBC 3.5 (L) 12/28/2018    RBC 3.52 (L) 12/28/2018    HGB 9.9 (L) 12/28/2018    HCT 32.8 (L) 12/28/2018    MCV 93 12/28/2018    MCH 28.1 12/28/2018    MCHC 30.2 (L) 12/28/2018    RDW 20.0 (H) 12/28/2018     12/28/2018       CMP RESULTS:  Lab Results   Component Value Date     12/28/2018    POTASSIUM 4.1 12/28/2018    CHLORIDE 104 12/28/2018    CO2 25 12/28/2018    ANIONGAP 6 12/28/2018     (H) 12/28/2018    BUN 18 12/28/2018    CR 4.22 (H) 12/28/2018    GFRESTIMATED 14 (L) 12/28/2018    GFRESTBLACK 16 (L) 12/28/2018    TRINI 8.8 12/28/2018    BILITOTAL 0.5 12/28/2018    ALBUMIN 2.6 (L) 12/15/2018    ALKPHOS 203 (H) 12/28/2018    ALT 16 12/28/2018    AST 18 12/28/2018        INR RESULTS:  Lab Results   Component Value Date    INR 1.05 09/10/2018       LIPID RESULTS:  Lab Results   Component Value Date    CHOL 154 12/03/2018    HDL 65 12/03/2018    LDL 49 12/03/2018    TRIG 201 (H) 12/03/2018    CHOLHDLRATIO 5.0 08/10/2015       IMMUNOSUPPRESSANT LEVELS:  Lab Results   Component Value Date    TACROL 3.2 (L) 12/28/2018    DOSTAC 12/27/2018 21:30 12/28/2018       No components found for: CK  Lab Results   Component Value Date    MAG 1.6 12/18/2018     Lab Results   Component Value Date    A1C 6.1 (H) 12/03/2018     Lab Results   Component Value Date    PHOS 3.3 12/18/2018     Lab Results   Component Value Date    [facility] 11/08/2016     Lab Results   Component Value Date     SAITESTMET [facility] 11/13/2018    SAICELL Class I 11/13/2018    [id number] None 11/13/2018    [id number] None 11/13/2018    SAIREPCOM  11/13/2018      Test performed by modified procedure. Serum heat inactivated and tested   by a modified (Waldo) protocol including fetal calf serum addition.   High-risk, mfi >3,000. Mod-risk, mfi 500-3,000.       Lab Results   Component Value Date    SAIITESTME [facility] 11/13/2018    SAIICELL Class II 11/13/2018    [id number] None 11/13/2018    [id number] None 11/13/2018    SAIIREPCOM  11/13/2018      Test performed by modified procedure. Serum heat inactivated and tested   by a modified (Waldo) protocol including fetal calf serum addition.   High-risk, mfi >3,000. Mod-risk, mfi 500-3,000.       Lab Results   Component Value Date    CSPEC Plasma, EDTA anticoagulant 12/18/2018       Diagnostic Studies:  TTE 12/3/18  Global and regional left ventricular function is hyperkinetic with an EF of  65-70%.  Right ventricular function, chamber size, wall motion, and thickness are  normal.  No significant valvular abnormalities.  The peak velocity of the tricuspid regurgitant jet is not obtainable.  The inferior vena cava was normal in size with preserved respiratory  variability.  No pericardial effusion is present.    Cor angiogram 7/18/18    RHC 12/3/18      Assessment/Plan:  Mr. Nicholson is a 63 year old male who is s/p orthotopic heart transplant on 6/14/18 who presents to clinic for follow-up.. Post-op course was complicated leukocytosis for which he received abx, RIJ thrombus infected with CoNS, incidental pneumoperitoneum, necrotic mouth sore with klebsiella and pseudomonas bacteremia, and perforation of the small bowel, status post small-bowel resection and ileostomy. He remains on HD for ESRD. From a cardiac standpoint, he has been stable with normal graft function. He is tolerating current doses of IS. Now that prograf is therapeutic, we are able to discontinue prednisone (per  protocol).     # Status post OHT on 6/14/18, history of NICM  # Donor 3 vessel CAD  # Chronic immunosuppression  * Rejection history: none.   * Recent immunosuppression changes: increases FK to stay within goal  * Last biopsy: 12/3/18 negative  * Intolerance to medications: none    Immunosuppression:  - Tacrolimus. Trough level goal 6-8  - Mycophenolate 1000 mg bid  - discontinue prednisone today now that FK within goal    Prophylaxis:  - PPI: pantoprazole 40 mg continue for now  - CAV: ASA 81 mg and atorvastatin 40 mg  - CMV high risk D+ R-: Valcyte d/c'ed due to leukopenia, now off per protocol (>6 mos)  - PCP: ok to discontinue Bactrim now that off pred  - Thrush: continue clotrimazole for now given open sore    # HTN slightly above goal, he will call coordinator with readings in about 10 days, continue on hydralazine 100 mg q8hr, lisinopril 10 mg daily, clonidine 0.1 mg at HS, amlodipine 10 mg daily - discuss any changes with renal  # Low phosphorus coordinator to discuss replacement w pharmD but with ESRD will likely just recheck, not on supplement  # Hx perforated bowel s/p colostomy stable  # Hx klebsiella mouth ulcer, resolving  # Hx pseudomonas bacteremia, resolved   # ESRD listed for renal transplant, on HD TTS, EDW 76 kg      25 minutes spent face-to-face with patient, >50% in counseling and/or coordination of care as described above      Ramya Suh, FRANK, NP-C  12/28/2018

## 2018-12-28 NOTE — PATIENT INSTRUCTIONS
Please call your coordinator at [phone] with any questions or concerns.      Coordinator will call with Prograf level    Discontinue Prednisone and Bactrim      Check in with Lillie in ~10 days with BP numbers    Discuss phosphorus level with Eduardo Lea    Try to do elliptical 3x/week ~20 mintues    Return to clinic Feb 1 as scheduled for month 8 follow up

## 2018-12-31 ENCOUNTER — PATIENT OUTREACH (OUTPATIENT)
Dept: SURGERY | Facility: CLINIC | Age: 63
End: 2018-12-31

## 2018-12-31 NOTE — PROGRESS NOTES
Patient was called after he left a message wanting to schedule his follow up with Dr. Tran. The direct phone number was left in a voicemail with a request for the patient to call back at his earliest convenience.     TONY Dorsey Care Coordinator  [facility]  Phone: [phone]

## 2019-01-09 DIAGNOSIS — Z94.1 HEART TRANSPLANT RECIPIENT (H): ICD-10-CM

## 2019-01-09 LAB
ANION GAP SERPL CALCULATED.3IONS-SCNC: 7 MMOL/L (ref 3–14)
BUN SERPL-MCNC: 15 MG/DL (ref 7–30)
CALCIUM SERPL-MCNC: 9.1 MG/DL (ref 8.5–10.1)
CHLORIDE SERPL-SCNC: 101 MMOL/L (ref 94–109)
CO2 SERPL-SCNC: 26 MMOL/L (ref 20–32)
CREAT SERPL-MCNC: 4.62 MG/DL (ref 0.66–1.25)
ERYTHROCYTE [DISTWIDTH] IN BLOOD BY AUTOMATED COUNT: 19 % (ref 10–15)
GFR SERPL CREATININE-BSD FRML MDRD: 12 ML/MIN/{1.73_M2}
GLUCOSE SERPL-MCNC: 120 MG/DL (ref 70–99)
HCT VFR BLD AUTO: 39.3 % (ref 40–53)
HGB BLD-MCNC: 12 G/DL (ref 13.3–17.7)
MAGNESIUM SERPL-MCNC: 1.3 MG/DL (ref 1.6–2.3)
MCH RBC QN AUTO: 28.5 PG (ref 26.5–33)
MCHC RBC AUTO-ENTMCNC: 30.5 G/DL (ref 31.5–36.5)
MCV RBC AUTO: 93 FL (ref 78–100)
PHOSPHATE SERPL-MCNC: 1.9 MG/DL (ref 2.5–4.5)
PLATELET # BLD AUTO: 158 10E9/L (ref 150–450)
POTASSIUM SERPL-SCNC: 4.1 MMOL/L (ref 3.4–5.3)
RBC # BLD AUTO: 4.21 10E12/L (ref 4.4–5.9)
SODIUM SERPL-SCNC: 135 MMOL/L (ref 133–144)
TACROLIMUS BLD-MCNC: 6.5 UG/L (ref 5–15)
TME LAST DOSE: NORMAL H
WBC # BLD AUTO: 3 10E9/L (ref 4–11)

## 2019-01-09 PROCEDURE — 80197 ASSAY OF TACROLIMUS: CPT | Performed by: INTERNAL MEDICINE

## 2019-01-10 ENCOUNTER — TELEPHONE (OUTPATIENT)
Dept: TRANSPLANT | Facility: CLINIC | Age: 64
End: 2019-01-10

## 2019-01-14 NOTE — TELEPHONE ENCOUNTER
Results called to patient. Tacro 6.5 (goal 6-8). Confirmed 12-hour trough and current dose 2/1. No dose change. Cr 4.62 (kidney listed, continues with HD). Mg 1.3 and Phos 1.9, message sent to Dr. Ernst for review. WBC 3.0, patient denies fevers and infectious symptoms. Patient requests that all prescriptions now be sent to [facility] on [address]. If new supplements are ordered patient will receive notification from pharm. Patient had no questions/concerns today.

## 2019-01-15 ENCOUNTER — TELEPHONE (OUTPATIENT)
Dept: FAMILY MEDICINE | Facility: CLINIC | Age: 64
End: 2019-01-15

## 2019-01-15 DIAGNOSIS — Z94.1 HEART TRANSPLANT RECIPIENT (H): Primary | ICD-10-CM

## 2019-01-15 NOTE — TELEPHONE ENCOUNTER
[facility] pharmacy called requesting a rx for Cerovite stating patient called them requesting it    Please advise to patient if needs to be seen    Pharmacy is loaded

## 2019-01-15 NOTE — TELEPHONE ENCOUNTER
Routing refill request to provider for review/approval because:  Drug not active on patient's medication list      Writer notes historical report of multi-vitamin 1/7/14

## 2019-01-16 RX ORDER — MULTIPLE VITAMINS W/ MINERALS TAB 9MG-400MCG
1 TAB ORAL DAILY
Qty: 90 TABLET | Refills: 3 | Status: SHIPPED | OUTPATIENT
Start: 2019-01-16 | End: 2019-03-13

## 2019-01-18 ENCOUNTER — TELEPHONE (OUTPATIENT)
Dept: NEPHROLOGY | Facility: CLINIC | Age: 64
End: 2019-01-18

## 2019-01-18 NOTE — TELEPHONE ENCOUNTER
Reviewed with Julieta Cook NP covering for Dr. Mcpherson in regards to low Mag (1.3) and low/stable Phos (1.9)  per Dr. Ernst. Recommendations per Julieta below and faxed and voiced to Dialysis unit.    Date: 1/18/2019    Time of Call: 1:37 PM     Diagnosis: ESRD     [ VORB ] Ordering provider: Julieta Cook NP  Order:      Please add 2m% Phos to dialysate q run with weekly Phos and Mag levels. He can have Mag oxide 200 mg po every day.   Thanks   Julieta    Order received by: Vickie Bryson LPN     Follow-up/additional notes: Communicated to Dialysis unit, faxed.    Vickie Bryson LPN  Nephrology  [phone]

## 2019-01-25 ENCOUNTER — PRE VISIT (OUTPATIENT)
Dept: TRANSPLANT | Facility: CLINIC | Age: 64
End: 2019-01-25

## 2019-01-25 DIAGNOSIS — Z94.1 HEART REPLACED BY TRANSPLANT (H): Primary | ICD-10-CM

## 2019-01-25 RX ORDER — LIDOCAINE 40 MG/G
CREAM TOPICAL
Status: CANCELLED | OUTPATIENT
Start: 2019-01-25

## 2019-01-31 DIAGNOSIS — Z94.1 HEART REPLACED BY TRANSPLANT (H): Primary | ICD-10-CM

## 2019-02-01 ENCOUNTER — HOSPITAL ENCOUNTER (OUTPATIENT)
Facility: CLINIC | Age: 64
Discharge: HOME | End: 2019-02-01
Attending: INTERNAL MEDICINE | Admitting: INTERNAL MEDICINE
Payer: MEDICARE

## 2019-02-01 ENCOUNTER — APPOINTMENT (OUTPATIENT)
Dept: MEDSURG UNIT | Facility: CLINIC | Age: 64
End: 2019-02-01
Attending: INTERNAL MEDICINE
Payer: MEDICARE

## 2019-02-01 ENCOUNTER — OFFICE VISIT (OUTPATIENT)
Dept: CARDIOLOGY | Facility: CLINIC | Age: 64
End: 2019-02-01
Attending: NURSE PRACTITIONER
Payer: MEDICARE

## 2019-02-01 VITALS
SYSTOLIC BLOOD PRESSURE: 148 MMHG | TEMPERATURE: 97.4 F | RESPIRATION RATE: 20 BRPM | HEART RATE: 90 BPM | DIASTOLIC BLOOD PRESSURE: 85 MMHG | OXYGEN SATURATION: 100 %

## 2019-02-01 VITALS
HEIGHT: 69 IN | DIASTOLIC BLOOD PRESSURE: 82 MMHG | WEIGHT: 159.3 LBS | HEART RATE: 82 BPM | OXYGEN SATURATION: 100 % | BODY MASS INDEX: 23.6 KG/M2 | SYSTOLIC BLOOD PRESSURE: 124 MMHG

## 2019-02-01 DIAGNOSIS — Z94.1 HEART TRANSPLANTED (H): Primary | ICD-10-CM

## 2019-02-01 DIAGNOSIS — Z94.1 HEART REPLACED BY TRANSPLANT (H): ICD-10-CM

## 2019-02-01 DIAGNOSIS — Z94.1 HEART TRANSPLANTED (H): ICD-10-CM

## 2019-02-01 LAB
ANION GAP SERPL CALCULATED.3IONS-SCNC: 8 MMOL/L (ref 3–14)
BUN SERPL-MCNC: 18 MG/DL (ref 7–30)
CALCIUM SERPL-MCNC: 9.6 MG/DL (ref 8.5–10.1)
CHLORIDE SERPL-SCNC: 96 MMOL/L (ref 94–109)
CMV DNA SPEC NAA+PROBE-ACNC: <137 [IU]/ML
CMV DNA SPEC NAA+PROBE-LOG#: <2.1 {LOG_IU}/ML
CO2 SERPL-SCNC: 26 MMOL/L (ref 20–32)
CREAT SERPL-MCNC: 5.85 MG/DL (ref 0.66–1.25)
ERYTHROCYTE [DISTWIDTH] IN BLOOD BY AUTOMATED COUNT: 15 % (ref 10–15)
GFR SERPL CREATININE-BSD FRML MDRD: 9 ML/MIN/{1.73_M2}
GLUCOSE SERPL-MCNC: 132 MG/DL (ref 70–99)
HCT VFR BLD AUTO: 39.5 % (ref 40–53)
HGB BLD-MCNC: 12.3 G/DL (ref 13.3–17.7)
MAGNESIUM SERPL-MCNC: 1.4 MG/DL (ref 1.6–2.3)
MCH RBC QN AUTO: 28.7 PG (ref 26.5–33)
MCHC RBC AUTO-ENTMCNC: 31.1 G/DL (ref 31.5–36.5)
MCV RBC AUTO: 92 FL (ref 78–100)
PHOSPHATE SERPL-MCNC: 2.5 MG/DL (ref 2.5–4.5)
PLATELET # BLD AUTO: 190 10E9/L (ref 150–450)
POTASSIUM SERPL-SCNC: 4.5 MMOL/L (ref 3.4–5.3)
RBC # BLD AUTO: 4.28 10E12/L (ref 4.4–5.9)
SODIUM SERPL-SCNC: 129 MMOL/L (ref 133–144)
SPECIMEN SOURCE: ABNORMAL
TACROLIMUS BLD-MCNC: 7.4 UG/L (ref 5–15)
TME LAST DOSE: NORMAL H
WBC # BLD AUTO: 3.3 10E9/L (ref 4–11)

## 2019-02-01 PROCEDURE — 40000166 ZZH STATISTIC PP CARE STAGE 1

## 2019-02-01 PROCEDURE — 88350 IMFLUOR EA ADDL 1ANTB STN PX: CPT | Performed by: INTERNAL MEDICINE

## 2019-02-01 PROCEDURE — 88307 TISSUE EXAM BY PATHOLOGIST: CPT | Performed by: INTERNAL MEDICINE

## 2019-02-01 PROCEDURE — G0463 HOSPITAL OUTPT CLINIC VISIT: HCPCS | Mod: ZF

## 2019-02-01 PROCEDURE — 87799 DETECT AGENT NOS DNA QUANT: CPT | Performed by: NURSE PRACTITIONER

## 2019-02-01 PROCEDURE — 88346 IMFLUOR 1ST 1ANTB STAIN PX: CPT | Performed by: INTERNAL MEDICINE

## 2019-02-01 PROCEDURE — 80048 BASIC METABOLIC PNL TOTAL CA: CPT | Performed by: NURSE PRACTITIONER

## 2019-02-01 PROCEDURE — 99207 ZZC NO CHARGE LOS: CPT | Performed by: PHYSICIAN ASSISTANT

## 2019-02-01 PROCEDURE — 27210794 ZZH OR GENERAL SUPPLY STERILE: Performed by: INTERNAL MEDICINE

## 2019-02-01 PROCEDURE — 85027 COMPLETE CBC AUTOMATED: CPT | Performed by: NURSE PRACTITIONER

## 2019-02-01 PROCEDURE — 80197 ASSAY OF TACROLIMUS: CPT | Performed by: NURSE PRACTITIONER

## 2019-02-01 PROCEDURE — 93505 ENDOMYOCARDIAL BIOPSY: CPT | Performed by: INTERNAL MEDICINE

## 2019-02-01 PROCEDURE — C1894 INTRO/SHEATH, NON-LASER: HCPCS | Performed by: INTERNAL MEDICINE

## 2019-02-01 PROCEDURE — 99214 OFFICE O/P EST MOD 30 MIN: CPT | Mod: ZP | Performed by: NURSE PRACTITIONER

## 2019-02-01 PROCEDURE — 25000125 ZZHC RX 250: Performed by: INTERNAL MEDICINE

## 2019-02-01 PROCEDURE — 83735 ASSAY OF MAGNESIUM: CPT | Performed by: NURSE PRACTITIONER

## 2019-02-01 PROCEDURE — 84100 ASSAY OF PHOSPHORUS: CPT | Performed by: NURSE PRACTITIONER

## 2019-02-01 PROCEDURE — 25000128 H RX IP 250 OP 636: Performed by: INTERNAL MEDICINE

## 2019-02-01 PROCEDURE — 36415 COLL VENOUS BLD VENIPUNCTURE: CPT | Performed by: NURSE PRACTITIONER

## 2019-02-01 RX ORDER — NITROGLYCERIN 5 MG/ML
VIAL (ML) INTRAVENOUS
Status: DISCONTINUED | OUTPATIENT
Start: 2019-02-01 | End: 2019-02-01 | Stop reason: HOSPADM

## 2019-02-01 RX ORDER — LIDOCAINE 40 MG/G
CREAM TOPICAL
Status: COMPLETED | OUTPATIENT
Start: 2019-02-01 | End: 2019-02-01

## 2019-02-01 RX ADMIN — LIDOCAINE: 40 CREAM TOPICAL at 10:14

## 2019-02-01 ASSESSMENT — MIFFLIN-ST. JEOR: SCORE: 1502.96

## 2019-02-01 ASSESSMENT — PAIN SCALES - GENERAL: PAINLEVEL: NO PAIN (0)

## 2019-02-01 NOTE — PROGRESS NOTES
Pt returned to 2A s/p heart biopsy. VSS, pt denies pain. L neck site CDI. Pt verbalized understanding of discharge instructions, declines copy.Pt discharged to home

## 2019-02-01 NOTE — Clinical Note
Potential access sites were evaluated for patency using ultrasound.   The left jugular vein was selected.   Access was obtained under ultrasound guidance with direct visualization of needle entry.

## 2019-02-01 NOTE — Clinical Note
dry, intact, no bleeding and no hematoma. 7 Fr sheath removed from the LIJ vein. Manual pressure held. Hemostasis obtained.

## 2019-02-01 NOTE — PROGRESS NOTES
ADULT HEART TRANSPLANT CLINIC  February 1, 2019    HPI:   Mr. Murray Nicholson is a 64yr old male with a history of NICM s/p OHT 6/14/18, HTN, HL, DMII, and ESRD (on hemodialysis since fall 2017) who presents to clinic for routine follow up.    His post-transplant course has been extremely complicated, and includes:  - leukocytosis  - RIJ thrombus infected with CoNS  - incidental pneumoperitoneum  - neutropenia  - oral mucosal ulcer secondary to neutropenia with Klebsiella infection  - pseudomonas bacteremia, resolved  - perforation of the small bowel, s/p small bowel resection and ileostomy  - end-stage renal disease requiring hemodialysis, currently listed for renal transplant    His biopsies have remained negative for rejection.  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  Last echo 12/2018 showed stable graft function with LVEF 65-70% and normal RV size and function.  Last RHC 12/2018 showed low biventricular filling pressures with RA 2, PCW 2, mPA 13, and CI 4.1.    Since his last visit, he states that he has been well.  His mouth sores have healed and not recurred.  He continues to have hearing issues, which were present prior to transplant but has since worsened, and plans to follow up with ENT.  His weight is down.  He had some dizziness last week, which he attributes to being too dry and has since resolved.  He notes that his UOP has decreased, which he also attributes to being too dry.  His BPs have been 110-120/70-80s.  He is eating, with good appetite and no nausea, vomiting, or diarrhea.  He remains active, with no exertional concerns.  He otherwise denies chest pain, palpitations, shortness of breath, PND, orthopnea, persistent dizziness, headaches, fevers, chills, cough, sore throat, and fluid retention.    Serostatus:  CMV D+/R-  EBV D+/R+      Patient Active Problem List    Diagnosis Date Noted      Colostomy status (H) 12/11/2018     Priority: Medium     Heart replaced by transplant (H) 12/10/2018     Priority: Medium     Added automatically from request for surgery [id number]       Sigmoid diverticulitis 08/14/2018     Priority: Medium     Bacteremia due to Pseudomonas 08/12/2018     Priority: Medium     Heart transplant recipient (H) 07/26/2018     Priority: Medium     Fever 07/26/2018     Priority: Medium     Leukopenia 07/11/2018     Priority: Medium     Heart transplanted (H) 06/15/2018     Priority: Medium     HRD    Donor CMV + / Recipient CMV -    Donor EBV +        Anemia in stage 5 chronic kidney disease (H) 03/17/2017     Priority: Medium     Anemia, iron deficiency 02/15/2017     Priority: Medium     Type 2 diabetes mellitus with diabetic chronic kidney disease (H) 10/14/2015     Priority: Medium     Hypertension      Priority: Medium     Dyslipidemia      Priority: Medium     MGUS (monoclonal gammopathy of unknown significance)      Priority: Medium     Ascending aortic aneurysm (H)      Priority: Medium     Anemia in chronic renal disease 05/19/2015     Priority: Medium     updating diagnosis code for icd10 cutover       Anemia of chronic disease 04/12/2013     Priority: Medium     Problem list name updated by automated process. Provider to review and confirm       Hypertension goal BP (blood pressure) < 130/80 01/25/2011     Priority: Medium     Hyperlipidemia LDL goal <100 10/31/2010     Priority: Medium     Per provider       CKD (chronic kidney disease) stage 5, GFR less than 15 ml/min (H) 02/09/2010     Priority: Medium     Allergic rhinitis 04/05/2006     Priority: Medium     Problem list name updated by automated process. Provider to review       Esophageal reflux 08/12/2004     Priority: Medium       PAST MEDICAL HISTORY:  Past Medical History:   Diagnosis Date     (HFpEF) heart failure with preserved ejection fraction (H)      Allergic rhinitis, cause unspecified      Anemia of chronic  kidney failure      AS (aortic stenosis)      Ascending aortic aneurysm (H)      Bicuspid aortic valve      CAD (coronary artery disease)      Chronic kidney disease, stage 5 (H)      Congestive heart failure, unspecified      Dialysis patient (H)     Tues-Thur-Sat     Dyslipidemia      Esophageal reflux      ESRD (end stage renal disease) (H)      Hearing problem      Hypersomnia with sleep apnea, unspecified      Hypertension      Immunosuppression (H)      Kidney problem      MGUS (monoclonal gammopathy of unknown significance)      Mitral regurgitation      SHEELA (obstructive sleep apnea)     No CPAP     Pneumonia      Systolic heart failure (H)      Type 2 diabetes mellitus (H)        CURRENT MEDICATIONS:  Prescription Medications as of 2/1/2019       Rx Number Disp Refills Start End Last Dispensed Date Next Fill Date Owning Pharmacy    albuterol (PROAIR HFA/PROVENTIL HFA/VENTOLIN HFA) 108 (90 Base) MCG/ACT inhaler            Sig: Inhale 2 puffs into the lungs every 4 hours as needed for shortness of breath / dyspnea or wheezing    Class: Historical    Route: Inhalation    amLODIPine (NORVASC) 10 MG tablet  90 tablet 3 10/23/2018    [address]    Sig: Take 1 tablet (10 mg) by mouth daily    Class: E-Prescribe    Route: Oral    aspirin 81 MG EC tablet        [facility] [place], [place] [facility] [address] [place] [address] AT [facility]    Sig: Take 81 mg by mouth daily    Class: Historical    Route: Oral    atorvastatin (LIPITOR) 40 MG tablet  90 tablet 3 10/9/2018    [address]    Sig: Take 1 tablet (40 mg) by mouth daily    Class: E-Prescribe    Notes to Pharmacy: 90 day supply if able.    Route: Oral    biotin (BIOTIN 5000) 5 MG CAPS  30 capsule 3 10/19/2018    [address]    Sig: Take 5 mg by mouth daily    Class:  E-Prescribe    Route: Oral    blood glucose monitoring (ACCU-CHEK FASTCLIX) lancets  102 each 11 3/19/2018    [id number] - [place], [place] - [address] AT [facility]    Sig: Use to test blood sugar 2-3 times daily or as directed.  Ok to substitute alternative if insurance prefers.    Class: E-Prescribe    blood glucose monitoring (NO BRAND SPECIFIED) test strip  100 each 11 7/20/2018    [address] [phone]    Sig: Use to test blood sugar 2-3 times daily or as directed.    Class: E-Prescribe    chlorhexidine (PERIDEX) 0.12 % solution  900 mL 0 10/25/2018    [phone]    Sig: Swish and spit 15 mLs in mouth 2 times daily    Class: E-Prescribe    Route: Swish & Spit    cloNIDine (CATAPRES) 0.1 MG tablet  90 tablet 3 11/6/2018    [address] [phone]    Sig: Take 1 tablet (0.1 mg) by mouth At Bedtime    Class: E-Prescribe    Notes to Pharmacy: 90 day supply if able.    Route: Oral    clotrimazole 10 MG kalpana  120 Kalpana 11 10/9/2018    [address]    Sig: Place 1 Kalpana (10 mg) inside cheek 4 times daily    Class: Local Print    Route: Buccal    fluticasone (FLONASE) 50 MCG/ACT spray  16 g 11 7/25/2018    [facility] [address]    Sig: [place] 1-2 sprays into both nostrils daily    Class: E-Prescribe    Route: Both Nostrils    hydrALAZINE (APRESOLINE) 100 MG TABS tablet  90 tablet 11 10/23/2018    [address]    Sig: Take 1 tablet (100 mg) by mouth every 8 hours    Class: E-Prescribe    Route: Oral    insulin aspart (NOVOLOG PEN) 100 UNIT/ML injection  9 mL 3 10/10/2018    [place] [address]  [facility]    Sig: Inject 1-7 Units Subcutaneous 4 times daily (before meals and nightly)    Class: E-Prescribe    Route: Subcutaneous    loratadine (CLARITIN) 10 MG tablet            Sig: Take 10 mg by mouth daily Reported on 5/3/2017    Class: Historical    Route: Oral    melatonin 1 MG TABS tablet  120 tablet 1 7/20/2018    [phone]    Sig: Take 2 tablets (2 mg) by mouth nightly as needed for sleep    Class: E-Prescribe    Route: Oral    multivitamin w/minerals (THERA-VIT-M) tablet  90 tablet 3 1/16/2019 1/16/2020   [facility] - [place], [place] - [address] [facility]    Sig: Take 1 tablet by mouth daily    Class: E-Prescribe    Route: Oral    mycophenolate (GENERIC EQUIVALENT) 250 MG capsule  240 capsule 11 11/20/2018    [address]    Sig: Take 4 capsules (1,000 mg) by mouth 2 times daily    Class: E-Prescribe    Notes to Pharmacy: Increase in dose    Route: Oral    Prior authorization:  Closed - Other    NEPHROCAPS 1 MG capsule  120 capsule 0 7/2/2018    [address]    Sig: Take 1 capsule by mouth daily    Class: Local Print    Route: Oral    pantoprazole (PROTONIX) 40 MG EC tablet  60 tablet 11 11/12/2018    [facility] - [place], [place] - [address] [facility]    Sig: Take 1 tablet (40 mg) by mouth 2 times daily (before meals)    Class: E-Prescribe    Route: Oral    tacrolimus (GENERIC EQUIVALENT) 1 MG capsule  90 capsule 11 12/28/2018    [address]    Sig: Take two capsules in the AM (2 mg) and one capsule in the PM (1 mg)    Class: E-Prescribe    Notes to Pharmacy: Dose increase    ceFEPIme (MAXIPIME) 1 g SOLR vial (Ended)  4 g 0 8/6/2018 8/9/2018   [facility]  [address]    Sig: Inject 2 g into the vein every 48 hours for 2 doses    Class: Local Print    Route: Intravenous    gentian violet 1 % solution  30 mL 1 10/19/2018    [phone]    Sig: Take 0.5 mLs by mouth 4 times daily    Class: E-Prescribe    Route: Mouth/Throat    insulin isophane human (HUMULIN N PEN) 100 UNIT/ML injection  6 mL 11 10/10/2018    [address]    Sig: Inject 16 Units Subcutaneous every morning (before breakfast)    Class: E-Prescribe    Route: Subcutaneous    insulin isophane human (HUMULIN N PEN) 100 UNIT/ML injection  3 mL 11 10/10/2018    [address]    Sig: Inject 12 Units Subcutaneous daily (with dinner)    Class: E-Prescribe    Route: Subcutaneous    lisinopril (PRINIVIL/ZESTRIL) 10 MG tablet  90 tablet 3 12/27/2018    [facility] - [place], [place] - [address] [facility]    Sig: Take 1 tablet (10 mg) by mouth daily    Class: E-Prescribe    Route: Oral    loperamide (IMODIUM A-D) 2 MG tablet (Ended)  30 tablet 0 12/17/2018 12/27/2018   [address]    Sig: Take 1 tablet (2 mg) by mouth 4 times daily as needed for diarrhea    Class: Local Print    Route: Oral    metroNIDAZOLE (FLAGYL) 250 MG tablet (Ended)  12 tablet 0 7/2/2018 7/6/2018   [address]    Sig: Take 1 tablet (250 mg) by mouth every 8 hours for 4 days    Class: Local Print    Route: Oral    metroNIDAZOLE (FLAGYL) 500 MG tablet (Ended)  3 tablet 0 12/3/2018 12/4/2018   [facility] - [place] - [address] [facility]    Sig: Take 1 tablet (500 mg) by mouth every 8 hours for 1 day prior to surgery.  Take in  "conjunction with Neomycin Sulfate.    Class: E-Prescribe    Notes to Pharmacy: Patients may experience nausea, but should do their best with the prep and antibiotics as it will reduce the risk of wound infection.    Route: Oral    neomycin (MYCIFRADIN) 500 MG tablet (Ended)  6 tablet 0 12/3/2018 12/4/2018   [facility] [address] - [place], [place] - [address] [facility]    Sig: Take 2 tablets (1,000 mg) by mouth every 8 hours for 1 day prior to surgery.  Take in conjunction with Flagyl.    Class: E-Prescribe    Notes to Pharmacy: Patients may experience nausea, but should do their best with the prep and antibiotics as it will reduce the risk of wound infection.    Route: Oral    ondansetron (ZOFRAN) 4 MG tablet  3 tablet 0 12/3/2018    [facility] - [place], [place] - [address]    Sig: Take 1 tablet (4 mg) by mouth every 6 hours as needed for nausea While taking neomycin and flagyl.    Class: E-Prescribe    Route: Oral    ondansetron (ZOFRAN-ODT) 4 MG ODT tab (Ended)  30 tablet 1 12/17/2018 12/24/2018   [place], [place] - [address] [place] [address]    Sig: Take 1 tablet (4 mg) by mouth every 8 hours as needed for nausea    Class: Local Print    Route: Oral    order for DME  30 each 11 12/17/2018 12/17/2019   [place], [place] - [address] [place] [address]    Sig: Coloplast       1 piece convex light Uriel cut up to 1  \" with filter #[id number]       Or  Harrison         1 piece soft convex 2 1/8\" #[id number]        Accessories   2\" barrier ring #[id number]                           Adapt paste #[id number]                           Adapt powder #[id number]                          No sting film barrier # [id number]                          Brava elastic barrier strips curved # [id number]    Treatment Diagnosis: New ileostomy    Class: Local Print    simethicone (MYLICON) 80 MG chewable tablet  180 tablet  9/11/2018        Sig: Take " "1 tablet (80 mg) by mouth every 6 hours as needed for cramping    Class: Historical    Route: Oral    traMADol (ULTRAM) 50 MG tablet (Ended)  15 tablet 0 12/17/2018 12/22/2018   [facility] - [place], [place] - [address] [place] [address]    Sig: Take 1-2 tablets ( mg) by mouth every 12 hours as needed for moderate pain    Class: Local Print    Route: Oral    triamcinolone (KENALOG) 0.1 % ointment  80 g 0 7/18/2018    [facility] - [place], [place] - [address] [place] [address]    Sig: Apply topically 2 times daily    Class: E-Prescribe    Route: Topical    Urea 40 % CREA  85 g 1 8/15/2018    [facility] [place] - [address]    Sig: Externally apply topically daily    Class: E-Prescribe    Route: Apply externally          ROS:   Constitutional: No fever, chills, or sweats. Weight loss.  ENT: As per HPI.  Allergies/Immunologic: Negative.   Respiratory: No cough, hemoptysis.   Cardiovascular: As per HPI.   GI: No nausea, vomiting, hematemesis, melena, or hematochezia.   : As per HPI.  Integument: Negative.   Psychiatric: Negative.   Neuro: Negative.   Endocrinology: Negative.   Musculoskeletal: Negative    Exam:  /82 (BP Location: Right arm, Patient Position: Chair, Cuff Size: Adult Regular)   Pulse 82   Ht 1.753 m (5' 9\")   Wt 72.3 kg (159 lb 4.8 oz)   SpO2 100%   BMI 23.52 kg/m    In general, the patient is a pleasant male in no apparent distress.    HEENT: NC/AT. PERRLA. EOMI.  Sclerae white, not injected.  No open lesions.   Neck:  No adenopathy, No thyromegaly.    COR: No jugular venous distention when sitting upright.  RRR.  Normal S1 S2 splits physiologically.  No murmur, rub click, or gallop.    Lungs:  CTA. No rhonchi.    Abdomen: soft, nontender, nondistended.  No organomegaly.  Extremities:  No clubbing, cyanosis, or LE edema.    Neuro: Alert & Oriented x 3, grossly non focal.  Integument:  No open lesions, rashes, or " jaundice.    Labs:  CBC RESULTS:   Lab Results   Component Value Date    WBC 3.3 (L) 02/01/2019    RBC 4.28 (L) 02/01/2019    HGB 12.3 (L) 02/01/2019    HCT 39.5 (L) 02/01/2019    MCV 92 02/01/2019    MCH 28.7 02/01/2019    MCHC 31.1 (L) 02/01/2019    RDW 15.0 02/01/2019     02/01/2019       BMP RESULTS:  Lab Results   Component Value Date     (L) 02/01/2019    POTASSIUM 4.5 02/01/2019    CHLORIDE 96 02/01/2019    CO2 26 02/01/2019    ANIONGAP 8 02/01/2019     (H) 02/01/2019    BUN 18 02/01/2019    CR 5.85 (H) 02/01/2019    GFRESTIMATED 9 (L) 02/01/2019    GFRESTBLACK 11 (L) 02/01/2019    TRINI 9.6 02/01/2019      LIPID RESULTS:  Lab Results   Component Value Date    CHOL 154 12/03/2018    HDL 65 12/03/2018    LDL 49 12/03/2018    TRIG 201 (H) 12/03/2018    CHOLHDLRATIO 5.0 08/10/2015    NHDL 89 12/03/2018       IMMUNOSUPPRESSANT LEVELS  Lab Results   Component Value Date    TACROL 6.5 01/09/2019    DOSTAC 2110 01/08/2019 01/09/2019       No components found for: CK  Lab Results   Component Value Date    MAG 1.4 (L) 02/01/2019     Lab Results   Component Value Date    A1C 6.1 (H) 12/03/2018     Lab Results   Component Value Date    PHOS 2.5 02/01/2019     Lab Results   Component Value Date    NTBNPI >175,000 (H) 07/26/2018     Lab Results   Component Value Date    SAITESTMET [facility] 11/13/2018    SAICELL Class I 11/13/2018    [id number] None 11/13/2018    [id number] None 11/13/2018    SAIREPCOM  11/13/2018      Test performed by modified procedure. Serum heat inactivated and tested   by a modified ([place]) protocol including fetal calf serum addition.   High-risk, mfi >3,000. Mod-risk, mfi 500-3,000.       Lab Results   Component Value Date    SAIITESTME [facility] 11/13/2018    SAIICELL Class II 11/13/2018    [id number] None 11/13/2018    [id number] None 11/13/2018    SAIIREPCOM  11/13/2018      Test performed by modified procedure. Serum heat inactivated and tested   by a modified ([place]) protocol  including fetal calf serum addition.   High-risk, mfi >3,000. Mod-risk, mfi 500-3,000.       Lab Results   Component Value Date    CSPEC Plasma, EDTA anticoagulant 12/28/2018       Diagnostic Studies:  TTE 12/3/18  Global and regional left ventricular function is hyperkinetic with an EF of  65-70%.  Right ventricular function, chamber size, wall motion, and thickness are  normal.  No significant valvular abnormalities.  The peak velocity of the tricuspid regurgitant jet is not obtainable.  The inferior vena cava was normal in size with preserved respiratory  variability.  No pericardial effusion is present.     Cor angiogram 7/18/18    RHC 12/3/18        Assessment and Plan:  Mr. Murray Nicholson is a 64yr old male with a history of NICM s/p OHT 6/14/18, HTN, HL, DMII, and ESRD (on hemodialysis since fall 2017) who presents to clinic for routine follow up.    NICM, s/p OHT 6/14/18  Donor 3-v CAD  His post-transplant course has been extremely complicated, and includes:  - leukocytosis  - RIJ thrombus infected with CoNS  - incidental pneumoperitoneum  - neutropenia  - oral mucosal ulcer secondary to neutropenia with Klebsiella infection  - pseudomonas bacteremia, resolved  - perforation of the small bowel, s/p small bowel resection and ileostomy  - end-stage renal disease requiring hemodialysis, currently listed for renal transplant    His biopsies have remained negative for rejection.  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  Last echo 12/2018 showed stable graft function with LVEF 65-70% and normal RV size and function.  Last RHC 12/2018 showed low biventricular filling pressures with RA 2, PCW 2, mPA 13, and CI 4.1.    Labs today show stable electrolytes, kidney function, and blood counts.  EBV, CMV, and tacro levels pending.    In spite of his complicated course, Mr. Nicholson has continued to make progress.  His  weight is down, and he feels dry, so he will discuss his fluid removal with his renal/dialysis team.  His BPs are at goal.  Advised that he continue current medications, with no changes.    Serostatus:  - CMV D+/R-  - EBV D+/R+    Immunosuppression:  - MMF 1000mg twice daily  - tacro, goal level 6-8    PPx:  - CAV:  Aspirin 81mg daily and atorvastatin 40mg daily  - Thrush:  Clotrimazole troches QID for ongoing ppx  - GERD:  Pantoprazole 40mg twice daily  - Osteoporosis:  Will review starting Calcium/vitamin D supplements at next visit    Graft function:  - HR:  >80  - BPs:  Controlled, continue amlodipine 10mg daily, clonidine 0.1mg daily, hydralazine 100mg TID, lisinopril 10mg daily.  - fluid status:  Euvolemic, on HD Tu/Th/Sa    Health maintenance:  - will follow up with ENT re: hearing issues and mouth lesions  - will follow up with renal re: fluid status and fistula  - will follow up with colorectal team re: ostomy and possible revision/takedown        The above was reviewed with Brigitte Bharat, who verbalized understanding and will call with further questions/concerns.    30 minutes spent face-to-face with patient, with >50% in counseling and/or coordination of care as described above.      Cleo Marks, DNP, APRN, FNP-BC  Advanced Heart Failure Nurse Practitioner  [facility]  Patient Care Team:  Yahir Turcios MD as PCP - General (Family Practice)  Yahir Turcios MD as PCP - Assigned PCP  Arcelia Blum MD as MD (Cardiology)  Bobby Mcconnell MD as MD (Surgery)  Amrita Tobin RN as Nurse Coordinator (Thoracic Surgery (Cardiothoracic Vascular Surgery))  Kristi Armas PA as Physician Assistant (Physician Assistant)  Jaky Canela as Clinic Care Coordinator  Ana Luisa Mcpherson MD as MD (Nephrology)  Lillie Ulloa RN as Transplant Coordinator  [facility] ([facility] ([place]  SARBJIT ADAM

## 2019-02-01 NOTE — PATIENT INSTRUCTIONS
Please call your coordinator at [phone] with any questions or concerns.      Coordinator will call with level and biopsy results

## 2019-02-01 NOTE — PROGRESS NOTES
[facility]   Interventional Cardiology        Consenting/Education for Endomyocardial Biopsy    Patient understands during the portion for the heart biopsy a fine tube (catheter) is put into the vein of the groin/neck. While the catheter is in your neck/groin vein, a  Biopsy forceps  or pincers at the end of the catheter are used to take the tissue samples. This is may be repeated to get enough samples. The biopsy pieces are very small (one to two millimeters).     Patient also understands risks and complications of the procedure which include, but are not limited to bruising/swelling around the incision site, infection, bleeding, allergic reaction to local anesthetic, air embolism, arterial puncture, stroke, heart attack.  Patient also understands this procedure requires moderate sedation.     Patient verbalized understanding of risks and benefits of the endomyocardial biopsy and has elected to proceed with the procedure.     Bobby Vogt PA-C  [facility] Cardiology Team

## 2019-02-01 NOTE — PROCEDURES
FINAL CARDIAC CATHETERIZATION REPORT     PROCEDURES PERFORMED:   Endomyocardial Biopsy    PHYSICIANS:  Attending Physician: Montrell Posada MD  Cardiology Fellow: Norma Stephenson MD    INDICATION:  Murray Nicholson is a 64 year old male who underwent orthotopic heart transplant 6/14/18.  He is here for endomyocardial biopsy for routine transplant graft surveillance.  He progressed to ESRD and has a R sided dialysis catheter, thus access was left internal jugular.    DESCRIPTION:  1. Consent obtained with discussion of risks.  All questions were answered.  2. Sterile prep and procedure.  3. Location with Sheaths:   Lf IJ  7 Fr 10 cm [short] and 40 cm long Daig sheath  4. Access: Local anesthetic with lidocaine.  A standard 18 guage needle with ultrasound guidance was used to establish vascular access using a modified Seldinger technique.  5. Diagnostic Catheters:   6 Fr 50 cm Mir Tesenomes Jaws forceps.    6. Estimated blood loss: < 5 ml    MEDICATIONS:  Heart rate, BP, respiration, oxygen saturation and patient responses were monitored throughout the procedure with the assistance of the RN under my supervision.    Procedures:    ENDOMYOCARDIAL BIOPSY:  1. Successful collection of 5 right ventricular endomyocardial biopsy samples using the Jawz.  A 150 cm J tip 0.035 guidewire was used to delivery the Hockey Stick 7 Fr Daig sheath.    Sheath Removal:  The left internal jugular sheath was manually removed in the cardiac catheterization laboratory.    Contrast: Isovue, 0 ml     Fluoroscopy Time: 1.8 min    COMPLICATIONS:  1. None    PLAN:   >> Continued medical management and lifestyle modification for cardiovascular risk factor optimization.   >> Discharge today per protocol  >> Follow up pathology results    The attending interventional cardiologist was present and supervised all critical aspects the procedure.    Findings discussed with Dr. Posada.    See CVIS report for final draft.    Norma Stephenson   Cardiology  Fellow    Staff Cardiologist: I supervised the cardiology fellow and reviewed the findings with the fellow at the completion of the procedure.  I personally performed the endomyocardial biopsies.  I agree with the documentation above.    Montrell Posada MD

## 2019-02-01 NOTE — NURSING NOTE
"Transplant Coordinator Note  Reason for visit: 8 month follow up  Coordinator: Present Lillie Ulloa  Caregiver:  LUIS      Health concerns addressed today:  Pt seen in clinic with NP Kendrick for 8 month follow up. NP reviewed available labs. Reports doing well overall. States that he thinks dialysis might be drying him out - goal weight \"keeps dropping\"; had episode of dizziness that improved with fluid intake. No swelling noted. SBP stable 110/120. Mouth healed; cont to follow with ENT. Hoping to have ileostomy revised this spring. Colon surgery appt next week .    RTC in 2 months for 10 month appt.     Immunosuppressants:  MMF 1000 mg BID  FK 2/1 mg (goal 6-8) -> level pending     Preventive care:  ASA daily; Lipitor 40 mg   CMV - check prn (mismatch)  Kalpana for thrush prevention while mouth is healing  Flu shot October 2018     Labs: Reviewed; copy provided     Medication record reviewed and reconciled  Questions and concerns addressed. AVS reviewed; copy provided.    Pt verbalized an understanding of plan of care.      Patient Instructions  ~Please call your coordinator at [phone] with any questions or concerns.    ~Coordinator will call with level and biopsy results.     "

## 2019-02-01 NOTE — LETTER
2/1/2019      RE: Murray Nicholson  [address]  [place] [address]       Dear Colleague,    Thank you for the opportunity to participate in the care of your patient, Murray Nicholson, at the [facility]. Please see a copy of my visit note below.        [facility]  February 1, 2019    HPI:   Mr. Murray Nicholson is a 64yr old male with a history of NICM s/p OHT 6/14/18, HTN, HL, DMII, and ESRD (on hemodialysis since fall 2017) who presents to clinic for routine follow up.    His post-transplant course has been extremely complicated, and includes:  - leukocytosis  - RIJ thrombus infected with CoNS  - incidental pneumoperitoneum  - neutropenia  - oral mucosal ulcer secondary to neutropenia with Klebsiella infection  - pseudomonas bacteremia, resolved  - perforation of the small bowel, s/p small bowel resection and ileostomy  - end-stage renal disease requiring hemodialysis, currently listed for renal transplant    His biopsies have remained negative for rejection.  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  Last echo 12/2018 showed stable graft function with LVEF 65-70% and normal RV size and function.  Last RHC 12/2018 showed low biventricular filling pressures with RA 2, PCW 2, mPA 13, and CI 4.1.    Since his last visit, he states that he has been well.  His mouth sores have healed and not recurred.  He continues to have hearing issues, which were present prior to transplant but has since worsened, and plans to follow up with ENT.  His weight is down.  He had some dizziness last week, which he attributes to being too dry and has since resolved.  He notes that his UOP has decreased, which he also attributes to being too dry.  His BPs have been 110-120/70-80s.  He is eating, with good appetite and no nausea, vomiting, or  diarrhea.  He remains active, with no exertional concerns.  He otherwise denies chest pain, palpitations, shortness of breath, PND, orthopnea, persistent dizziness, headaches, fevers, chills, cough, sore throat, and fluid retention.    Serostatus:  CMV D+/R-  EBV D+/R+      Patient Active Problem List    Diagnosis Date Noted     Colostomy status (H) 12/11/2018     Priority: Medium     Heart replaced by transplant (H) 12/10/2018     Priority: Medium     Added automatically from request for surgery [id number]       Sigmoid diverticulitis 08/14/2018     Priority: Medium     Bacteremia due to Pseudomonas 08/12/2018     Priority: Medium     Heart transplant recipient (H) 07/26/2018     Priority: Medium     Fever 07/26/2018     Priority: Medium     Leukopenia 07/11/2018     Priority: Medium     Heart transplanted (H) 06/15/2018     Priority: Medium     HRD    Donor CMV + / Recipient CMV -    Donor EBV +        Anemia in stage 5 chronic kidney disease (H) 03/17/2017     Priority: Medium     Anemia, iron deficiency 02/15/2017     Priority: Medium     Type 2 diabetes mellitus with diabetic chronic kidney disease (H) 10/14/2015     Priority: Medium     Hypertension      Priority: Medium     Dyslipidemia      Priority: Medium     MGUS (monoclonal gammopathy of unknown significance)      Priority: Medium     Ascending aortic aneurysm (H)      Priority: Medium     Anemia in chronic renal disease 05/19/2015     Priority: Medium     updating diagnosis code for icd10 cutover       Anemia of chronic disease 04/12/2013     Priority: Medium     Problem list name updated by automated process. Provider to review and confirm       Hypertension goal BP (blood pressure) < 130/80 01/25/2011     Priority: Medium     Hyperlipidemia LDL goal <100 10/31/2010     Priority: Medium     Per provider       CKD (chronic kidney disease) stage 5, GFR less than 15 ml/min (H) 02/09/2010     Priority: Medium     Allergic rhinitis 04/05/2006     Priority:  Medium     Problem list name updated by automated process. Provider to review       Esophageal reflux 08/12/2004     Priority: Medium       PAST MEDICAL HISTORY:  Past Medical History:   Diagnosis Date     (HFpEF) heart failure with preserved ejection fraction (H)      Allergic rhinitis, cause unspecified      Anemia of chronic kidney failure      AS (aortic stenosis)      Ascending aortic aneurysm (H)      Bicuspid aortic valve      CAD (coronary artery disease)      Chronic kidney disease, stage 5 (H)      Congestive heart failure, unspecified      Dialysis patient (H)     Tues-Thur-Sat     Dyslipidemia      Esophageal reflux      ESRD (end stage renal disease) (H)      Hearing problem      Hypersomnia with sleep apnea, unspecified      Hypertension      Immunosuppression (H)      Kidney problem      MGUS (monoclonal gammopathy of unknown significance)      Mitral regurgitation      SHEELA (obstructive sleep apnea)     No CPAP     Pneumonia      Systolic heart failure (H)      Type 2 diabetes mellitus (H)        CURRENT MEDICATIONS:  Prescription Medications as of 2/1/2019       Rx Number Disp Refills Start End Last Dispensed Date Next Fill Date Owning Pharmacy    albuterol (PROAIR HFA/PROVENTIL HFA/VENTOLIN HFA) 108 (90 Base) MCG/ACT inhaler            Sig: Inhale 2 puffs into the lungs every 4 hours as needed for shortness of breath / dyspnea or wheezing    Class: Historical    Route: Inhalation    amLODIPine (NORVASC) 10 MG tablet  90 tablet 3 10/23/2018    [facility] [place], [place] - [address] [facility]    Sig: Take 1 tablet (10 mg) by mouth daily    Class: E-Prescribe    Route: Oral    aspirin 81 MG EC tablet        [id number] [place] [place] [facility] [address] [facility] [address] [facility]    Sig: Take 81 mg by mouth daily    Class: Historical    Route: Oral    atorvastatin (LIPITOR) 40 MG tablet  90 tablet 3 10/9/2018    [place], [place]  - [address] [facility]    Sig: Take 1 tablet (40 mg) by mouth daily    Class: E-Prescribe    Notes to Pharmacy: 90 day supply if able.    Route: Oral    biotin (BIOTIN 5000) 5 MG CAPS  30 capsule 3 10/19/2018    [place], [place] - [address] [place] [address]    Sig: Take 5 mg by mouth daily    Class: E-Prescribe    Route: Oral    blood glucose monitoring (ACCU-CHEK FASTCLIX) lancets  102 each 11 3/19/2018    [id number] [place], [place] - [address] AT [facility]    Sig: Use to test blood sugar 2-3 times daily or as directed.  Ok to substitute alternative if insurance prefers.    Class: E-Prescribe    blood glucose monitoring (NO BRAND SPECIFIED) test strip  100 each 11 7/20/2018    [phone]    Sig: Use to test blood sugar 2-3 times daily or as directed.    Class: E-Prescribe    chlorhexidine (PERIDEX) 0.12 % solution  900 mL 0 10/25/2018    [phone]    Sig: Swish and spit 15 mLs in mouth 2 times daily    Class: E-Prescribe    Route: Swish & Spit    cloNIDine (CATAPRES) 0.1 MG tablet  90 tablet 3 11/6/2018    [phone]    Sig: Take 1 tablet (0.1 mg) by mouth At Bedtime    Class: E-Prescribe    Notes to Pharmacy: 90 day supply if able.    Route: Oral    clotrimazole 10 MG kalpana  120 Kalpana 11 10/9/2018    [place], [place] - [address] [place] [address]    Sig: Place 1 Kalpana (10 mg) inside cheek 4 times daily    Class: Local Print    Route: Buccal    fluticasone (FLONASE) 50 MCG/ACT spray  16 g 11 7/25/2018    [facility] - [place], [place] - [address] [place] [address]    Sig: Sims 1-2 sprays into both nostrils daily    Class: E-Prescribe    Route: Both Nostrils    hydrALAZINE (APRESOLINE) 100 MG TABS tablet   90 tablet 11 10/23/2018    [address]    Sig: Take 1 tablet (100 mg) by mouth every 8 hours    Class: E-Prescribe    Route: Oral    insulin aspart (NOVOLOG PEN) 100 UNIT/ML injection  9 mL 3 10/10/2018    [address]    Sig: Inject 1-7 Units Subcutaneous 4 times daily (before meals and nightly)    Class: E-Prescribe    Route: Subcutaneous    loratadine (CLARITIN) 10 MG tablet            Sig: Take 10 mg by mouth daily Reported on 5/3/2017    Class: Historical    Route: Oral    melatonin 1 MG TABS tablet  120 tablet 1 7/20/2018    [address]    Sig: Take 2 tablets (2 mg) by mouth nightly as needed for sleep    Class: E-Prescribe    Route: Oral    multivitamin w/minerals (THERA-VIT-M) tablet  90 tablet 3 1/16/2019 1/16/2020   [facility] - [place], [place] - [address] [facility]    Sig: Take 1 tablet by mouth daily    Class: E-Prescribe    Route: Oral    mycophenolate (GENERIC EQUIVALENT) 250 MG capsule  240 capsule 11 11/20/2018    [address]    Sig: Take 4 capsules (1,000 mg) by mouth 2 times daily    Class: E-Prescribe    Notes to Pharmacy: Increase in dose    Route: Oral    Prior authorization:  Closed - Other    NEPHROCAPS 1 MG capsule  120 capsule 0 7/2/2018    [address]    Sig: Take 1 capsule by mouth daily    Class: Local Print    Route: Oral    pantoprazole (PROTONIX) 40 MG EC tablet  60 tablet 11 11/12/2018    [facility] - [place], [place] - [address] [facility]    Sig: Take 1 tablet (40 mg) by mouth 2 times daily (before meals)    Class: E-Prescribe    Route: Oral    tacrolimus (GENERIC EQUIVALENT) 1 MG capsule  90 capsule 11  12/28/2018    [address]    Sig: Take two capsules in the AM (2 mg) and one capsule in the PM (1 mg)    Class: E-Prescribe    Notes to Pharmacy: Dose increase    ceFEPIme (MAXIPIME) 1 g SOLR vial (Ended)  4 g 0 8/6/2018 8/9/2018   [address]    Sig: Inject 2 g into the vein every 48 hours for 2 doses    Class: Local Print    Route: Intravenous    gentian violet 1 % solution  30 mL 1 10/19/2018    [phone]    Sig: Take 0.5 mLs by mouth 4 times daily    Class: E-Prescribe    Route: Mouth/Throat    insulin isophane human (HUMULIN N PEN) 100 UNIT/ML injection  6 mL 11 10/10/2018    [address]    Sig: Inject 16 Units Subcutaneous every morning (before breakfast)    Class: E-Prescribe    Route: Subcutaneous    insulin isophane human (HUMULIN N PEN) 100 UNIT/ML injection  3 mL 11 10/10/2018    [address]    Sig: Inject 12 Units Subcutaneous daily (with dinner)    Class: E-Prescribe    Route: Subcutaneous    lisinopril (PRINIVIL/ZESTRIL) 10 MG tablet  90 tablet 3 12/27/2018    [facility] - [place], [place] - [address] & [facility]    Sig: Take 1 tablet (10 mg) by mouth daily    Class: E-Prescribe    Route: Oral    loperamide (IMODIUM A-D) 2 MG tablet (Ended)  30 tablet 0 12/17/2018 12/27/2018   [address]    Sig: Take 1 tablet (2 mg) by mouth 4 times daily as needed for diarrhea    Class: Local Print    Route: Oral    metroNIDAZOLE (FLAGYL) 250 MG tablet (Ended)  12 tablet 0 7/2/2018 7/6/2018   [address]    Sig: Take 1 tablet (250 mg) by mouth every  "8 hours for 4 days    Class: Local Print    Route: Oral    metroNIDAZOLE (FLAGYL) 500 MG tablet (Ended)  3 tablet 0 12/3/2018 12/4/2018   [facility] - [place], [place] - [address]    Sig: Take 1 tablet (500 mg) by mouth every 8 hours for 1 day prior to surgery.  Take in conjunction with Neomycin Sulfate.    Class: E-Prescribe    Notes to Pharmacy: Patients may experience nausea, but should do their best with the prep and antibiotics as it will reduce the risk of wound infection.    Route: Oral    neomycin (MYCIFRADIN) 500 MG tablet (Ended)  6 tablet 0 12/3/2018 12/4/2018   [facility] - [place], [place] - [address] & [address]    Sig: Take 2 tablets (1,000 mg) by mouth every 8 hours for 1 day prior to surgery.  Take in conjunction with Flagyl.    Class: E-Prescribe    Notes to Pharmacy: Patients may experience nausea, but should do their best with the prep and antibiotics as it will reduce the risk of wound infection.    Route: Oral    ondansetron (ZOFRAN) 4 MG tablet  3 tablet 0 12/3/2018    [facility] - [place], [place] - [address] & [address]    Sig: Take 1 tablet (4 mg) by mouth every 6 hours as needed for nausea While taking neomycin and flagyl.    Class: E-Prescribe    Route: Oral    ondansetron (ZOFRAN-ODT) 4 MG ODT tab (Ended)  30 tablet 1 12/17/2018 12/24/2018   [address]    Sig: Take 1 tablet (4 mg) by mouth every 8 hours as needed for nausea    Class: Local Print    Route: Oral    order for DME  30 each 11 12/17/2018 12/17/2019   [address]    Sig: Coloplast       1 piece convex light [place] cut up to 1  \" with filter #[id number]       Or  Minto         1 piece soft convex 2 1/8\" #[id number]        Accessories   2\" barrier ring #[id number]                           Adapt paste " "#[id number]                           Adapt powder #[id number]                          No sting film barrier # [id number]                          Brava elastic barrier strips curved # [id number]    Treatment Diagnosis: New ileostomy    Class: Local Print    simethicone (MYLICON) 80 MG chewable tablet  180 tablet  9/11/2018        Sig: Take 1 tablet (80 mg) by mouth every 6 hours as needed for cramping    Class: Historical    Route: Oral    traMADol (ULTRAM) 50 MG tablet (Ended)  15 tablet 0 12/17/2018 12/22/2018   [facility] - [place], [place] - [address] [place] [address]    Sig: Take 1-2 tablets ( mg) by mouth every 12 hours as needed for moderate pain    Class: Local Print    Route: Oral    triamcinolone (KENALOG) 0.1 % ointment  80 g 0 7/18/2018    [facility] - [place], [place] - [address] [place] [address]    Sig: Apply topically 2 times daily    Class: E-Prescribe    Route: Topical    Urea 40 % CREA  85 g 1 8/15/2018    [facility] - [place], [place] - [address] [place] [address]    Sig: Externally apply topically daily    Class: E-Prescribe    Route: Apply externally          ROS:   Constitutional: No fever, chills, or sweats. Weight loss.  ENT: As per HPI.  Allergies/Immunologic: Negative.   Respiratory: No cough, hemoptysis.   Cardiovascular: As per HPI.   GI: No nausea, vomiting, hematemesis, melena, or hematochezia.   : As per HPI.  Integument: Negative.   Psychiatric: Negative.   Neuro: Negative.   Endocrinology: Negative.   Musculoskeletal: Negative    Exam:  /82 (BP Location: Right arm, Patient Position: Chair, Cuff Size: Adult Regular)   Pulse 82   Ht 1.753 m (5' 9\")   Wt 72.3 kg (159 lb 4.8 oz)   SpO2 100%   BMI 23.52 kg/m     In general, the patient is a pleasant male in no apparent distress.    HEENT: NC/AT. PERRLA. EOMI.  Sclerae white, not injected.  No open lesions.   Neck:  No adenopathy, No thyromegaly.    COR: No jugular venous distention when sitting upright.  " RRR.  Normal S1 S2 splits physiologically.  No murmur, rub click, or gallop.    Lungs:  CTA. No rhonchi.    Abdomen: soft, nontender, nondistended.  No organomegaly.  Extremities:  No clubbing, cyanosis, or LE edema.    Neuro: Alert & Oriented x 3, grossly non focal.  Integument:  No open lesions, rashes, or jaundice.    Labs:  CBC RESULTS:   Lab Results   Component Value Date    WBC 3.3 (L) 02/01/2019    RBC 4.28 (L) 02/01/2019    HGB 12.3 (L) 02/01/2019    HCT 39.5 (L) 02/01/2019    MCV 92 02/01/2019    MCH 28.7 02/01/2019    MCHC 31.1 (L) 02/01/2019    RDW 15.0 02/01/2019     02/01/2019       BMP RESULTS:  Lab Results   Component Value Date     (L) 02/01/2019    POTASSIUM 4.5 02/01/2019    CHLORIDE 96 02/01/2019    CO2 26 02/01/2019    ANIONGAP 8 02/01/2019     (H) 02/01/2019    BUN 18 02/01/2019    CR 5.85 (H) 02/01/2019    GFRESTIMATED 9 (L) 02/01/2019    GFRESTBLACK 11 (L) 02/01/2019    TRINI 9.6 02/01/2019      LIPID RESULTS:  Lab Results   Component Value Date    CHOL 154 12/03/2018    HDL 65 12/03/2018    LDL 49 12/03/2018    TRIG 201 (H) 12/03/2018    CHOLHDLRATIO 5.0 08/10/2015    NHDL 89 12/03/2018       IMMUNOSUPPRESSANT LEVELS  Lab Results   Component Value Date    TACROL 6.5 01/09/2019    DOSTAC 2110 01/08/2019 01/09/2019       No components found for: CK  Lab Results   Component Value Date    MAG 1.4 (L) 02/01/2019     Lab Results   Component Value Date    A1C 6.1 (H) 12/03/2018     Lab Results   Component Value Date    PHOS 2.5 02/01/2019     Lab Results   Component Value Date    NTBNPI >175,000 (H) 07/26/2018     Lab Results   Component Value Date    SAITESTMET SA FCS 11/13/2018    SAICELL Class I 11/13/2018    DO1TFBLUF None 11/13/2018    UR1FEIDNKH None 11/13/2018    SAIREPCOM  11/13/2018      Test performed by modified procedure. Serum heat inactivated and tested   by a modified (Allen) protocol including fetal calf serum addition.   High-risk, mfi >3,000. Mod-risk, mfi  500-3,000.       Lab Results   Component Value Date    SAIITESTME SA FCS 11/13/2018    SAIICELL Class II 11/13/2018    KT9GJIIPP None 11/13/2018    UQ8JGZOAEI None 11/13/2018    SAIIREPCOM  11/13/2018      Test performed by modified procedure. Serum heat inactivated and tested   by a modified (Fillmore) protocol including fetal calf serum addition.   High-risk, mfi >3,000. Mod-risk, mfi 500-3,000.       Lab Results   Component Value Date    CSPEC Plasma, EDTA anticoagulant 12/28/2018       Diagnostic Studies:  TTE 12/3/18  Global and regional left ventricular function is hyperkinetic with an EF of  65-70%.  Right ventricular function, chamber size, wall motion, and thickness are  normal.  No significant valvular abnormalities.  The peak velocity of the tricuspid regurgitant jet is not obtainable.  The inferior vena cava was normal in size with preserved respiratory  variability.  No pericardial effusion is present.     Cor angiogram 7/18/18    RHC 12/3/18        Assessment and Plan:  Mr. Murray Nicholson is a 64yr old male with a history of NICM s/p OHT 6/14/18, HTN, HL, DMII, and ESRD (on hemodialysis since fall 2017) who presents to clinic for routine follow up.    NICM, s/p OHT 6/14/18  Donor 3-v CAD  His post-transplant course has been extremely complicated, and includes:  - leukocytosis  - RIJ thrombus infected with CoNS  - incidental pneumoperitoneum  - neutropenia  - oral mucosal ulcer secondary to neutropenia with Klebsiella infection  - pseudomonas bacteremia, resolved  - perforation of the small bowel, s/p small bowel resection and ileostomy  - end-stage renal disease requiring hemodialysis, currently listed for renal transplant    His biopsies have remained negative for rejection.  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  Last echo 12/2018 showed stable graft function with LVEF 65-70% and normal RV size  and function.  Last RHC 12/2018 showed low biventricular filling pressures with RA 2, PCW 2, mPA 13, and CI 4.1.    Labs today show stable electrolytes, kidney function, and blood counts.  EBV, CMV, and tacro levels pending.    In spite of his complicated course, Mr. Nicholson has continued to make progress.  His weight is down, and he feels dry, so he will discuss his fluid removal with his renal/dialysis team.  His BPs are at goal.  Advised that he continue current medications, with no changes.    Serostatus:  - CMV D+/R-  - EBV D+/R+    Immunosuppression:  - MMF 1000mg twice daily  - tacro, goal level 6-8    PPx:  - CAV:  Aspirin 81mg daily and atorvastatin 40mg daily  - Thrush:  Clotrimazole troches QID for ongoing ppx  - GERD:  Pantoprazole 40mg twice daily  - Osteoporosis:  Will review starting Calcium/vitamin D supplements at next visit    Graft function:  - HR:  >80  - BPs:  Controlled, continue amlodipine 10mg daily, clonidine 0.1mg daily, hydralazine 100mg TID, lisinopril 10mg daily.  - fluid status:  Euvolemic, on HD Tu/Th/Sa    Health maintenance:  - will follow up with ENT re: hearing issues and mouth lesions  - will follow up with renal re: fluid status and fistula  - will follow up with colorectal team re: ostomy and possible revision/takedown        The above was reviewed with Mr. Nicholson, who verbalized understanding and will call with further questions/concerns.    30 minutes spent face-to-face with patient, with >50% in counseling and/or coordination of care as described above.      Cleo Marks, DNP, APRN, FNP-BC  Advanced Heart Failure Nurse Practitioner  [facility]  Patient Care Team:  Yahir Turcios MD as PCP - General (Family Practice)  Yahir Turcios MD as PCP - Assigned PCP  Arcelia Blum MD as MD (Cardiology)  Bobby Mcconnell MD as MD (Surgery)  Amrita Tobin, TONY as Nurse Coordinator (Thoracic Surgery (Cardiothoracic Vascular  Surgery))  Kristi Armas PA as Physician Assistant (Physician Assistant)  Jaky Canela as Clinic Care Coordinator  Ana Luisa Mcpherson MD as MD (Nephrology)  Lillie Ulloa RN as Transplant Coordinator  [facility] ([facility] ([place]))  SARBJIT ADAM

## 2019-02-04 ENCOUNTER — OFFICE VISIT (OUTPATIENT)
Dept: SURGERY | Facility: CLINIC | Age: 64
End: 2019-02-04
Payer: MEDICARE

## 2019-02-04 VITALS
SYSTOLIC BLOOD PRESSURE: 132 MMHG | BODY MASS INDEX: 24.17 KG/M2 | DIASTOLIC BLOOD PRESSURE: 86 MMHG | HEIGHT: 69 IN | WEIGHT: 163.2 LBS | OXYGEN SATURATION: 100 % | HEART RATE: 99 BPM | TEMPERATURE: 97.6 F

## 2019-02-04 DIAGNOSIS — Z98.890 POSTOPERATIVE STATE: Primary | ICD-10-CM

## 2019-02-04 DIAGNOSIS — K57.20 PERFORATION OF SIGMOID COLON DUE TO DIVERTICULITIS: ICD-10-CM

## 2019-02-04 ASSESSMENT — PAIN SCALES - GENERAL: PAINLEVEL: NO PAIN (0)

## 2019-02-04 ASSESSMENT — MIFFLIN-ST. JEOR: SCORE: 1520.65

## 2019-02-04 NOTE — PROGRESS NOTES
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    INTERVAL HISTORY: Denies increased pain, fevers, or chills. Tolerating diet well. Ileostomy output ranges between 700-900mL/24h and denies pouching issues. Continues on hemodialysis T-T-S. He is currently listed for kidney transplantation and is schedule for a AVF after his ileostomy takedown. Heart TX allograft function seems to be good per report and reat with mild focal rejection.     Physical Examination:  /86   Pulse 99   Temp 97.6  F (36.4  C) (Oral)   Ht 5' 9\"   Wt 163 lb 3.2 oz   SpO2 100%   BMI 24.10 kg/m    Abdomen soft, NT. Incisions with no evidence of infection or hernia. Left-sided ileostomy viable and functioning.   Perianal skin intact. Healed right posterior fistulotomy scar. CHRISTINA: no masses palpated. Right posterior sphincter defect.  Flexible sigmoidoscopy: after obtaining informed consent and performing a \"time out\", an adult flexible sigmoidoscope was introduced through the anus and passed up to the descending colon. The quality of the prep was good. Findings: mild diversion proctitis. End-to-side colorectal anastomosis at 13-cm from the anal verge with stricture. No evidence of dehiscence. Anastomotic stricture was traversed with the adult scope with mild pressure. Descending colon appeared normal. There was no active ulceration, inflammation or bleeding. No additional abnormalities were seen. Total scope time: 15 minutes. The patient tolerated the procedure well.    ASSESSMENT  Doing well postop. Anastomotic stricture. Will need dilation before ileostomy takedown. I thoroughly discussed the risks, benefits, and alternatives of operative treatment " and the patient agrees to proceed.     Pathology:  FINAL DIAGNOSIS:   COLOSTOMY, TAKEDOWN:   - Benign colon and skin with scarring, remote hemorrhage and foreign body   giant cell reaction compatible with   colostomy     PLAN  1. Low fiber diet.  2. Routine stoma cares.  3. Schedule Flex Sig and dilation at unit J. After this, schedule GGE.  4. Schedule DLI takedown end of February-March. Will need PAC and Labs.     Time spent: 30 minutes.  >50% spent in discussing, counseling and coordinating care.    Rick Tran M.D., M.Sc.     Division of Colon and Rectal Surgery  [facility]    Referring Provider:  Klever Ernst MD     Primary Care Provider:  Yahir Turcios

## 2019-02-04 NOTE — LETTER
"2019       RE: Murray Nicholson  [address]  [place] [address]     Dear Colleague,    Thank you for referring your patient, Murray Nicholson, to the [facility] COLON AND RECTAL SURGERY at [facility]. Please see a copy of my visit note below.    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    INTERVAL HISTORY: Denies increased pain, fevers, or chills. Tolerating diet well. Ileostomy output ranges between 700-900mL/24h and denies pouching issues. Continues on hemodialysis T-T-S. He is currently listed for kidney transplantation and is schedule for a AVF after his ileostomy takedown. Heart TX allograft function seems to be good per report and reat with mild focal rejection.     Physical Examination:  /86   Pulse 99   Temp 97.6  F (36.4  C) (Oral)   Ht 5' 9\"   Wt 163 lb 3.2 oz   SpO2 100%   BMI 24.10 kg/m     Abdomen soft, NT. Incisions with no evidence of infection or hernia. Left-sided ileostomy viable and functioning.   Perianal skin intact. Healed right posterior fistulotomy scar. CHRISTINA: no masses palpated. Right posterior sphincter defect.  Flexible sigmoidoscopy: after obtaining informed consent and performing a \"time out\", an adult flexible sigmoidoscope was introduced through the anus and passed up to the descending colon. The quality of the prep was good. Findings: mild diversion proctitis. End-to-side colorectal anastomosis at 13-cm from the anal verge with stricture. No evidence of dehiscence. Anastomotic stricture was traversed with the adult scope with mild pressure. Descending colon appeared normal. There was no active ulceration, inflammation or bleeding. No " additional abnormalities were seen. Total scope time: 15 minutes. The patient tolerated the procedure well.    ASSESSMENT  Doing well postop. Anastomotic stricture. Will need dilation before ileostomy takedown. I thoroughly discussed the risks, benefits, and alternatives of operative treatment and the patient agrees to proceed.     Pathology:  FINAL DIAGNOSIS:   COLOSTOMY, TAKEDOWN:   - Benign colon and skin with scarring, remote hemorrhage and foreign body   giant cell reaction compatible with   colostomy     PLAN  1. Low fiber diet.  2. Routine stoma cares.  3. Schedule Flex Sig and dilation at unit J. After this, schedule GGE.  4. Schedule DLI takedown end of February-March. Will need PAC and Labs.     Time spent: 30 minutes.  >50% spent in discussing, counseling and coordinating care.    Referring Provider:  Klever Ernst MD     Primary Care Provider:  Yahir Turcios      Again, thank you for allowing me to participate in the care of your patient.      Sincerely,    Rick Trna MD

## 2019-02-04 NOTE — NURSING NOTE
"  Chief Complaint   Patient presents with     Surgical Followup     Post op     Vitals:    02/04/19 1413   BP: 132/86   Pulse: 99   Temp: 97.6  F (36.4  C)   TempSrc: Oral   SpO2: 100%   Weight: 74 kg (163 lb 3.2 oz)   Height: 1.753 m (5' 9\")     Body mass index is 24.1 kg/m .  Breanna Verde CMA    "

## 2019-02-05 DIAGNOSIS — Z94.1 HEART REPLACED BY TRANSPLANT (H): Primary | ICD-10-CM

## 2019-02-07 ENCOUNTER — TELEPHONE (OUTPATIENT)
Dept: TRANSPLANT | Facility: CLINIC | Age: 64
End: 2019-02-07

## 2019-02-07 ENCOUNTER — TELEPHONE (OUTPATIENT)
Dept: SURGERY | Facility: CLINIC | Age: 64
End: 2019-02-07

## 2019-02-07 NOTE — LETTER
Coordinator: TONY Mcknight Nicholson Schedule revised on 2/12/2019  CMV pos, EBV pending, Toxo pending MRN:  [id number] CMV neg, EBV pos, VZV pos   Physician: Klever Ernst MD  Transplant Date: 6/13/2018   Tues, Thurs and Sat - Dialysis days    Time Post  Transplant Procedures to Expect this Visit Visit Date   40 Weeks  Month 10 Labs & Biopsy, CMV, EBV. Clinic appt with MD/NP. Friday, 3/29/19   1st Annual 2 day visit: Extended Labs with DSAs, EBV, CMV, X-rays, DEXA, ECHO/MRI, Angiogram/RHC/IVUS/Biopsy, CPX, EKG.  Clinic appt with MD/NP. [place], [place].  Friday, 6/7/19 &  Monday, 6/10/19   * Labs, including Prograf level, will be drawn in the [facility] Waiting Room prior to the biopsy unless indicated.  * Please take medication evening before to ensure 12-hour trough the next AM. (i.e. Labs at 8:30 AM, take medication the evening before at 8:30 PM.)  * Do not take medication in AM until after blood drawn.     Tuesday 2/19/18 8:00 am Labs [facility]  1st floor    8:30 am Appointment with Dr. Bañuelos, ENT [facility]  4th floor       Wednesday  3/13/18 11:00 am PAC Eval [facility]  4th floor       Wednesday  3/27/18  [facility]       Friday  3/29/19     (40 weeks, month 10) 8:00 am Labs [facility]  1st floor    8:30 am Appointment with Cleo Marks NP [facility]  3rd floor    9:00 am Shuttle to [facility]    9:30 am Heart biopsy  (Fast for three hours before testing) [facility] waiting room  2nd floor    12:30 pm Shuttle to the [facility]    1:00 pm Appointment with Eduardo Lea [facility]  3rd floor         Monday 4/8/18 9:30 am Appointment with VERONICA Fischer, CNP [facility]  4th floor     Monday 4/22/18 11:30 am Appointment with Dr. Tran [facility]  4th floor     Friday 6/7/18    Day 1 of 2 [8:00 am] Fasting Labs and [place]  Blood Draw [facility]  2nd floor    [8:30 am] Angiogram  (Do not eat or drink anything after midnight; be sure to arrange a ) [facility]  2nd floor   (First Annual)    Monday  6/10/18    Day 2 of 2 [8:15 am]  On hold Cardiac MRI  (No caffeine for 12 hours before testing; fast for 3 hours before MRI) [facility]  2nd floor    [10:30 am]   On hold Cardiopulmonary Stress Test  (No caffeine for 12 hours before testing; do not eat/drink anything after midnight) [facility]  2nd floor    [12:30 pm] X-Rays [facility]  2nd floor    2:30 pm Either walk or take the shuttle to the [facility]    3:00 pm Appointment with Dr. Ernst, your cardiologist [facility]  3rd floor

## 2019-02-07 NOTE — TELEPHONE ENCOUNTER
"----- Message from Roxana Foster sent at 1/24/2019  3:27 PM CST -----  Regarding: FW: ENT  First available appt that doesn't conflict with pt's Tuesday dialysis is out to March. Is there any chance he can add an appt on this Tuesday, 1/29?  Dr. King's clinic note on 12/18 states \"I will plan to see him after the New Year in eight weeks or so.\"    ----- Message -----  From: Lillie Ulloa RN  Sent: 1/23/2019   9:00 AM  To: Roxana Foster  Subject: ENT                                              Pt was to have an 8 week follow up with Dr Mendes - could you please arrange?    Thanks!  Lillie    "

## 2019-02-07 NOTE — TELEPHONE ENCOUNTER
Spoke with the patient and confirmed ENT appointments on 2/19.  Informed patient an itinerary can be accessed on [employer].    2/19/19 8:00 AM 15 [facility]   2/19/19 8:30 AM 15 Tristan Bañuelos MD

## 2019-02-08 LAB — ALLOMAP SCORE (EXTERNAL): NORMAL

## 2019-02-11 ENCOUNTER — TELEPHONE (OUTPATIENT)
Dept: SURGERY | Facility: CLINIC | Age: 64
End: 2019-02-11

## 2019-02-11 DIAGNOSIS — Z94.1 HEART REPLACED BY TRANSPLANT (H): Primary | ICD-10-CM

## 2019-02-11 NOTE — TELEPHONE ENCOUNTER
Patient is scheduled for surgery with Dr. Tran      Spoke or left message with: Murray    Date of Surgery: 3/27/2019    Location: [facility]    H&P: Scheduled with 3/13/2019    Additional imaging/appointments: Post ops scheduled with NP and surgeon    Surgery packet: Will be mailed     Additional comments: NA

## 2019-02-12 DIAGNOSIS — Z94.1 HEART TRANSPLANT RECIPIENT (H): ICD-10-CM

## 2019-02-12 RX ORDER — TACROLIMUS 1 MG/1
CAPSULE ORAL
Qty: 90 CAPSULE | Refills: 11 | Status: SHIPPED | OUTPATIENT
Start: 2019-02-12 | End: 2019-02-15

## 2019-02-13 ENCOUNTER — TELEPHONE (OUTPATIENT)
Dept: OTOLARYNGOLOGY | Facility: CLINIC | Age: 64
End: 2019-02-13

## 2019-02-14 DIAGNOSIS — Z94.1 HEART TRANSPLANT RECIPIENT (H): ICD-10-CM

## 2019-02-15 ENCOUNTER — TELEPHONE (OUTPATIENT)
Dept: GASTROENTEROLOGY | Facility: CLINIC | Age: 64
End: 2019-02-15

## 2019-02-15 ENCOUNTER — OFFICE VISIT (OUTPATIENT)
Dept: AUDIOLOGY | Facility: CLINIC | Age: 64
End: 2019-02-15
Attending: OTOLARYNGOLOGY
Payer: MEDICARE

## 2019-02-15 DIAGNOSIS — H90.42 SENSORINEURAL HEARING LOSS (SNHL) OF LEFT EAR WITH UNRESTRICTED HEARING OF RIGHT EAR: Primary | ICD-10-CM

## 2019-02-15 RX ORDER — TACROLIMUS 1 MG/1
CAPSULE ORAL
Qty: 270 CAPSULE | Refills: 11 | Status: SHIPPED | OUTPATIENT
Start: 2019-02-15 | End: 2019-03-22

## 2019-02-15 NOTE — TELEPHONE ENCOUNTER
: [x] N/A   [] Yes:  Language /  ID:     VM with request pt contact Endoscopy Pre-assessment RN to review upcoming procedure information.  Telephone call-back number provided.    Marta Hernandez, RN  [facility]    Additional Information regarding appointment:      Patient scheduled for:  [] EGD  [x] Colonoscopy  [] EUS  [] Flex Sig   [] Other:     Indication for procedure. [] Screening   [x] Postoperative state ,Perforation of sigmoid colon due to diverticulitis    Procedure Provider:  Chelsea      Referring Provider. Yahir Turcios    Arrival time verified: Thurs; 2/21/19; 0700    Facility location verified:   [x][facility] - [address] [place] [address], 1st Floor, [address]  [address] [facility], 5th floor       Prep Type:   []Golytely eRx: ;  [] MoviPrep: , [x] MiraLax: (OTC), [] Other:   []NPO /p [place], No solid food /p 2200 the night before    Anticoagulants or blood thinners: []None [x] ASA 81mg [] Warfarin  [] Warfarin + Lovenox bridge [] Plavix [] Effient [] Eliquis  [] Xarelto  [] Brilinta [] NSAIDS  [] Other    LAST anticoagulant dose: Date/Time: Will continue ASA 81mg  INR: N/A    Electronic implanted devices: [x] No  [] IPG  []  ICD  []  LVAD  []     H&P / Pre op physical completed: [x] N/A, [] Complete, Date , [] Scheduled, Date , [] No,     Additional Information: Contact [facility], Kidney transplant listed.     _______________________________________________

## 2019-02-15 NOTE — PROGRESS NOTES
AUDIOLOGY REPORT    SUMMARY: Audiology visit completed. See audiogram for results.      RECOMMENDATIONS: Follow-up with ENT.      Hiram Grubbs, CCC-A  Licensed Audiologist  [place] #[id number]

## 2019-02-15 NOTE — TELEPHONE ENCOUNTER
Patient scheduled for:  [] EGD  [x] Colonoscopy  [] EUS  [] Flex Sig   [] Other:     Indication for procedure. [] Screening   [x] Postoperative state ,Perforation of sigmoid colon due to diverticulitis    Procedure Provider:  Chelsea                           Referring Provider. Yahir Turcios    Arrival time verified: Thurs; 2/21/19; 0700    Facility location verified:   [x][facility] - [address] [place] [address], 1st Floor, [address]  [address] [facility], 5th floor        Prep Type:   []Golytely eRx: ;  [] MoviPrep: , [x] MiraLax: (OTC), [] Other:   []NPO /p [place], No solid food /p 2200 the night before    Anticoagulants or blood thinners: []None [x] ASA 81mg [] Warfarin  [] Warfarin + Lovenox bridge [] Plavix [] Effient [] Eliquis  [] Xarelto  [] Brilinta [] NSAIDS  [] Other    LAST anticoagulant dose: Date/Time: Will continue ASA 81mg  INR: N/A    Electronic implanted devices: [x] No  [] IPG  []  ICD  []  LVAD  []     H&P / Pre op physical completed: [x] N/A, [] Complete, Date , [] Scheduled, Date , [] No,     Additional Information: Contact [facility] - [facility], Kidney transplant listed.     _______________________________________________       Instructions given: [] Rec d & Read   [] Reviewed   [x] Resent via [employer]     [] Resent via eMail (see below)     Pre procedure teaching completed: [x] Yes - Reviewed, [] No,     Transportation from procedure: [x] Yes Friend, [] Pending , [] No     Marta Hernandez, RN  [facility]

## 2019-02-18 ENCOUNTER — PATIENT OUTREACH (OUTPATIENT)
Dept: SURGERY | Facility: CLINIC | Age: 64
End: 2019-02-18

## 2019-02-18 ENCOUNTER — TELEPHONE (OUTPATIENT)
Dept: TRANSPLANT | Facility: CLINIC | Age: 64
End: 2019-02-18

## 2019-02-18 DIAGNOSIS — Z94.1 HEART TRANSPLANTED (H): ICD-10-CM

## 2019-02-18 RX ORDER — MYCOPHENOLATE MOFETIL 250 MG/1
1000 CAPSULE ORAL 2 TIMES DAILY
Qty: 240 CAPSULE | Refills: 11 | Status: SHIPPED | OUTPATIENT
Start: 2019-02-18 | End: 2019-03-22

## 2019-02-18 NOTE — TELEPHONE ENCOUNTER
Spoke with the patient and confirmed heart TX appointments on 3/22 instead of 3/29.  Informed patient an itinerary can be accessed on [employer], and will be sent via mail.

## 2019-02-18 NOTE — TELEPHONE ENCOUNTER
----- Message from Lillie Ulloa RN sent at 2/18/2019 10:09 AM CST -----  Regarding: Move March appts to week early    Pt has ileostomy take down surgery on 3/27 - cards/biopsy on 3/29 - can we move our appts to the week before

## 2019-02-18 NOTE — PROGRESS NOTES
Patient was called in regard to his voicemail message. Patient was advised that he does not need to complete a bowel prep for his upcoming procedure. Patient stated understanding of appointment date, time, and location. Patient is in agreement with this plan of care. Patient's questions and concerns were addressed to his stated satisfaction. Patient will callback directly with further questions or concerns.

## 2019-02-18 NOTE — TELEPHONE ENCOUNTER
Spoke with the patient to offer 10-mo HBX appts on Friday, 3/15.    He seems to think that it would be okay to leave where it is (3/27 surgery and 3/29 HBX). I told him that I would speak with his coordinator. He is fairly certain that they will keep him in the hospital after the ileostomy take-down on Wednesday and just wheel him down for a HBX on Friday.

## 2019-02-19 DIAGNOSIS — Z94.1 HEART REPLACED BY TRANSPLANT (H): ICD-10-CM

## 2019-02-19 DIAGNOSIS — K57.20 PERFORATION OF SIGMOID COLON DUE TO DIVERTICULITIS: ICD-10-CM

## 2019-02-19 DIAGNOSIS — Z98.890 POSTOPERATIVE STATE: ICD-10-CM

## 2019-02-19 LAB
ABO + RH BLD: NORMAL
ABO + RH BLD: NORMAL
ALBUMIN SERPL-MCNC: 3.4 G/DL (ref 3.4–5)
ALP SERPL-CCNC: 350 U/L (ref 40–150)
ALT SERPL W P-5'-P-CCNC: 23 U/L (ref 0–70)
ANION GAP SERPL CALCULATED.3IONS-SCNC: 10 MMOL/L (ref 3–14)
AST SERPL W P-5'-P-CCNC: 27 U/L (ref 0–45)
BILIRUB SERPL-MCNC: 0.7 MG/DL (ref 0.2–1.3)
BLD GP AB SCN SERPL QL: NORMAL
BLOOD BANK CMNT PATIENT-IMP: NORMAL
BUN SERPL-MCNC: 45 MG/DL (ref 7–30)
CALCIUM SERPL-MCNC: 9.5 MG/DL (ref 8.5–10.1)
CHLORIDE SERPL-SCNC: 102 MMOL/L (ref 94–109)
CO2 SERPL-SCNC: 20 MMOL/L (ref 20–32)
CREAT SERPL-MCNC: 7.68 MG/DL (ref 0.66–1.25)
ERYTHROCYTE [DISTWIDTH] IN BLOOD BY AUTOMATED COUNT: 14.7 % (ref 10–15)
GFR SERPL CREATININE-BSD FRML MDRD: 7 ML/MIN/{1.73_M2}
GLUCOSE SERPL-MCNC: 184 MG/DL (ref 70–99)
HCT VFR BLD AUTO: 36.7 % (ref 40–53)
HGB BLD-MCNC: 11.5 G/DL (ref 13.3–17.7)
MCH RBC QN AUTO: 29.2 PG (ref 26.5–33)
MCHC RBC AUTO-ENTMCNC: 31.3 G/DL (ref 31.5–36.5)
MCV RBC AUTO: 93 FL (ref 78–100)
PLATELET # BLD AUTO: 164 10E9/L (ref 150–450)
POTASSIUM SERPL-SCNC: 5.4 MMOL/L (ref 3.4–5.3)
PREALB SERPL IA-MCNC: 33 MG/DL (ref 15–45)
PROT SERPL-MCNC: 7.2 G/DL (ref 6.8–8.8)
RBC # BLD AUTO: 3.94 10E12/L (ref 4.4–5.9)
SODIUM SERPL-SCNC: 132 MMOL/L (ref 133–144)
SPECIMEN EXP DATE BLD: NORMAL
WBC # BLD AUTO: 4.1 10E9/L (ref 4–11)

## 2019-02-21 ENCOUNTER — HOSPITAL ENCOUNTER (OUTPATIENT)
Facility: CLINIC | Age: 64
Discharge: HOME | End: 2019-02-21
Attending: COLON & RECTAL SURGERY | Admitting: COLON & RECTAL SURGERY
Payer: MEDICARE

## 2019-02-21 ENCOUNTER — PATIENT OUTREACH (OUTPATIENT)
Dept: SURGERY | Facility: CLINIC | Age: 64
End: 2019-02-21

## 2019-02-21 VITALS
HEIGHT: 69 IN | HEART RATE: 81 BPM | OXYGEN SATURATION: 96 % | BODY MASS INDEX: 23.87 KG/M2 | WEIGHT: 161.16 LBS | RESPIRATION RATE: 8 BRPM | SYSTOLIC BLOOD PRESSURE: 158 MMHG | DIASTOLIC BLOOD PRESSURE: 106 MMHG

## 2019-02-21 LAB
FLEXIBLE SIGMOIDOSCOPY: NORMAL
GLUCOSE BLDC GLUCOMTR-MCNC: 106 MG/DL (ref 70–99)

## 2019-02-21 PROCEDURE — 25000132 ZZH RX MED GY IP 250 OP 250 PS 637: Mod: GY | Performed by: COLON & RECTAL SURGERY

## 2019-02-21 PROCEDURE — 25000128 H RX IP 250 OP 636: Performed by: COLON & RECTAL SURGERY

## 2019-02-21 PROCEDURE — G0500 MOD SEDAT ENDO SERVICE >5YRS: HCPCS | Performed by: COLON & RECTAL SURGERY

## 2019-02-21 PROCEDURE — 82962 GLUCOSE BLOOD TEST: CPT

## 2019-02-21 PROCEDURE — 45386 COLONOSCOPY W/BALLOON DILAT: CPT | Performed by: COLON & RECTAL SURGERY

## 2019-02-21 RX ORDER — SIMETHICONE
LIQUID (ML) MISCELLANEOUS PRN
Status: DISCONTINUED | OUTPATIENT
Start: 2019-02-21 | End: 2019-02-21 | Stop reason: HOSPADM

## 2019-02-21 RX ORDER — FENTANYL CITRATE 50 UG/ML
INJECTION, SOLUTION INTRAMUSCULAR; INTRAVENOUS PRN
Status: DISCONTINUED | OUTPATIENT
Start: 2019-02-21 | End: 2019-02-21 | Stop reason: HOSPADM

## 2019-02-21 ASSESSMENT — MIFFLIN-ST. JEOR: SCORE: 1511.38

## 2019-02-21 NOTE — OR NURSING
Pt tolerated colonoscopy with anastomosis dilatation, very well . Balloon held at 18 X4 minutes, 19 X 2 minutes and 20 X2 minutes

## 2019-02-22 LAB
ALLOMAP SCORE (EXTERNAL): 38
NEGATIVE PREDICTIVE VALUE PERCENT (EXTERNAL): 98.2 %
POSITIVE PREDICTIVE VALUE PERCENT (EXTERNAL): NORMAL %

## 2019-02-25 ENCOUNTER — OFFICE VISIT (OUTPATIENT)
Dept: FAMILY MEDICINE | Facility: CLINIC | Age: 64
End: 2019-02-25
Payer: MEDICARE

## 2019-02-25 VITALS
HEART RATE: 90 BPM | WEIGHT: 158 LBS | BODY MASS INDEX: 23.33 KG/M2 | RESPIRATION RATE: 16 BRPM | SYSTOLIC BLOOD PRESSURE: 129 MMHG | DIASTOLIC BLOOD PRESSURE: 73 MMHG | TEMPERATURE: 97.5 F | OXYGEN SATURATION: 99 %

## 2019-02-25 DIAGNOSIS — M79.641 PAIN IN BOTH HANDS: Primary | ICD-10-CM

## 2019-02-25 DIAGNOSIS — M79.642 PAIN IN BOTH HANDS: Primary | ICD-10-CM

## 2019-02-25 DIAGNOSIS — M79.642 BILATERAL HAND PAIN: Primary | ICD-10-CM

## 2019-02-25 DIAGNOSIS — M79.641 BILATERAL HAND PAIN: Primary | ICD-10-CM

## 2019-02-25 PROCEDURE — 99213 OFFICE O/P EST LOW 20 MIN: CPT | Performed by: NURSE PRACTITIONER

## 2019-02-25 NOTE — TELEPHONE ENCOUNTER
RECORDS RECEIVED FROM: Pain in both hands / ortho con referral. no outside recs.   DATE RECEIVED: 02/25/19    NOTES STATUS DETAILS   OFFICE NOTE from referring provider Internal Dr. Coleman 02/25/19    OFFICE NOTE from other specialist N/A    DISCHARGE SUMMARY from hospital N/A    DISCHARGE REPORT from the ER N/A    OPERATIVE REPORT N/A    MEDICATION LIST Internal    IMPLANT RECORD/STICKER N/A    LABS     CBC/DIFF Internal 2/19/19   CULTURES N/A    INJECTIONS DONE IN RADIOLOGY N/A    MRI N/A    CT SCAN N/A    XRAYS (IMAGES & REPORTS) N/A    TUMOR     PATHOLOGY  Slides & report N/A

## 2019-02-25 NOTE — PROGRESS NOTES
SUBJECTIVE:   Murray Nicholson is a 64 year old male who presents to clinic today for the following health issues:    Hand pain and tingling      Duration: 4 weeks     Description  Location: both hands     Intensity:  moderate, severe    Accompanying signs and symptoms: none    History  Previous similar problem: no   Previous evaluation:  none    Precipitating or alleviating factors:  Trauma or overuse: no   Aggravating factors include: none    Therapies tried and outcome: nothing    Acute onset 3-4 weeks ago  No known injury or trauma.    No neck pain or injury.    Has diabetes and has excellent control of his glucoses.  Last A1c 5.4.      Problem list and histories reviewed & adjusted, as indicated.  Additional history: as documented    Reviewed and updated as needed this visit by clinical staff  Tobacco  Allergies  Meds  Problems  Med Hx  Surg Hx  Fam Hx  Soc Hx        Reviewed and updated as needed this visit by Provider  Tobacco  Allergies  Meds  Problems  Med Hx  Surg Hx  Fam Hx         ROS:  Constitutional, HEENT, cardiovascular, pulmonary, GI, , musculoskeletal, neuro, skin, endocrine and psych systems are negative, except as otherwise noted.    OBJECTIVE:     /73   Pulse 90   Temp 97.5  F (36.4  C) (Oral)   Resp 16   Wt 71.7 kg (158 lb)   SpO2 99%   BMI 23.33 kg/m    Body mass index is 23.33 kg/m .  GENERAL: healthy, alert and no distress  RESP: lungs clear to auscultation - no rales, rhonchi or wheezes  CV: regular rate and rhythm, normal S1 S2, no S3 or S4, no murmur, click or rub, no peripheral edema and peripheral pulses strong  MS: .  Bilateral hands tender with movement.  negative phalens, tinels and finklesteins bilaterally.  FROM to both hands and all fingers.  no gross musculoskeletal defects noted, no edema  SKIN: no suspicious lesions or rashes  NEURO: altered sensation to bilateral hands - all digits.  Normal strength and tone, mentation intact and speech  normal        ASSESSMENT/PLAN:       ICD-10-CM    1. Pain in both hands M79.641 ORTHO  REFERRAL    M79.642      Discussed normal appearance and function with pain and tingling.  Not likely diabetes related given recent A1c.    Offered options to watch and wait or get labs or go to orthopedics.  Pt prefers to go to ortho for full workup.      VERONICA Brunson CNP  [facility]

## 2019-02-26 ENCOUNTER — ANCILLARY PROCEDURE (OUTPATIENT)
Dept: GENERAL RADIOLOGY | Facility: CLINIC | Age: 64
End: 2019-02-26
Payer: MEDICARE

## 2019-02-26 ENCOUNTER — OFFICE VISIT (OUTPATIENT)
Dept: ORTHOPEDICS | Facility: CLINIC | Age: 64
End: 2019-02-26
Payer: MEDICARE

## 2019-02-26 ENCOUNTER — PRE VISIT (OUTPATIENT)
Dept: ORTHOPEDICS | Facility: CLINIC | Age: 64
End: 2019-02-26

## 2019-02-26 ENCOUNTER — OFFICE VISIT (OUTPATIENT)
Dept: OTOLARYNGOLOGY | Facility: CLINIC | Age: 64
End: 2019-02-26
Payer: MEDICARE

## 2019-02-26 VITALS — BODY MASS INDEX: 23.85 KG/M2 | WEIGHT: 161 LBS | HEIGHT: 69 IN

## 2019-02-26 VITALS — HEIGHT: 69 IN | BODY MASS INDEX: 23.85 KG/M2 | RESPIRATION RATE: 16 BRPM | WEIGHT: 161 LBS

## 2019-02-26 DIAGNOSIS — M79.641 BILATERAL HAND PAIN: ICD-10-CM

## 2019-02-26 DIAGNOSIS — M85.60 BONE CYST: Primary | ICD-10-CM

## 2019-02-26 DIAGNOSIS — M79.642 BILATERAL HAND PAIN: ICD-10-CM

## 2019-02-26 DIAGNOSIS — G56.03 BILATERAL CARPAL TUNNEL SYNDROME: Primary | ICD-10-CM

## 2019-02-26 ASSESSMENT — MIFFLIN-ST. JEOR
SCORE: 1510.67
SCORE: 1510.67

## 2019-02-26 ASSESSMENT — PAIN SCALES - GENERAL: PAINLEVEL: NO PAIN (0)

## 2019-02-26 NOTE — PATIENT INSTRUCTIONS
Thank you for choosing  [facility]  Please follow up with Dr. Bañuelos in approximately 3 months.    [phone] appointment scheduling option 1 and nurse advice option 3.      Patient will call if we do not call them within 72 hours of the scan.

## 2019-02-26 NOTE — Clinical Note
Thank you for allowing me to see your patient in [facility].  Please see the attached copy of our visit.Sincerely,Abram Moulton MD
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
## 2019-02-26 NOTE — NURSING NOTE
Chief Complaint   Patient presents with     RECHECK     8 week follow up - having some hearing problems     Jairo Guadarrama, EMT

## 2019-02-26 NOTE — LETTER
RE: Murray Nicholson  [address]  [place] [address]     Dear Colleague,    Thank you for referring your patient, Murray Nicholson, to the [facility] EAR NOSE AND THROAT at [facility]. Please see a copy of my visit note below.    HISTORY OF PRESENT ILLNESS:  Murray Nicholson is 64 years of age.  He is here for follow-up today.  He has an open area on the roof of his mouth that shows a little bit of necrotic palate or similar.  It is very, very strange lesion that has not been imaged for four months.  I thought he would be healed by now, but it is still not healed, according to him.  He says it is mostly healed though.  No other current complaints.      PHYSICAL EXAMINATION:  The patient is alert, oriented x3 and pleasant.  Skin, face, lips, and neck on him is quite normal.  Nasal cavity exam is normal.  Ear canals, tympanic membranes are normal.  Oral cavity is examined.  There is no question he still has a little bit of what appears to be almost like dead bone that has not eliminated of the central palate behind the incisors.  The teeth are not loose.  Everything else looks healthy now.  There are no other lesions growing from this, but it is very strange.  Neck exam shows no masses, adenopathy or thyromegaly.  I almost think that this would be almost like an osteonecrosis that one sees with bisphosphonates or something similar.      ASSESSMENT/PLAN:  Patient with a history of a lesion of the palate.  I am going to repeat a CT scan to make sure nothing is changing about this and have him to clinic after that.      FO/ms     Again, thank you for allowing me to participate in the care of your patient.      Sincerely,    Tristan Bañuelos MD

## 2019-02-26 NOTE — PROGRESS NOTES
HISTORY OF PRESENT ILLNESS:  Murray Nicholson is 64 years of age.  He is here for follow-up today.  He has an open area on the roof of his mouth that shows a little bit of necrotic palate or similar.  It is very, very strange lesion that has not been imaged for four months.  I thought he would be healed by now, but it is still not healed, according to him.  He says it is mostly healed though.  No other current complaints.      PHYSICAL EXAMINATION:  The patient is alert, oriented x3 and pleasant.  Skin, face, lips, and neck on him is quite normal.  Nasal cavity exam is normal.  Ear canals, tympanic membranes are normal.  Oral cavity is examined.  There is no question he still has a little bit of what appears to be almost like dead bone that has not eliminated of the central palate behind the incisors.  The teeth are not loose.  Everything else looks healthy now.  There are no other lesions growing from this, but it is very strange.  Neck exam shows no masses, adenopathy or thyromegaly.  I almost think that this would be almost like an osteonecrosis that one sees with bisphosphonates or something similar.      ASSESSMENT/PLAN:  Patient with a history of a lesion of the palate.  I am going to repeat a CT scan to make sure nothing is changing about this and have him to clinic after that.      FO/ms
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
## 2019-03-07 ENCOUNTER — TELEPHONE (OUTPATIENT)
Dept: TRANSPLANT | Facility: CLINIC | Age: 64
End: 2019-03-07

## 2019-03-07 DIAGNOSIS — K86.2 PANCREATIC CYST: Primary | ICD-10-CM

## 2019-03-07 NOTE — LETTER
Coordinator: TONY Mcknightaaron Nicholson Schedule revised on 3/7/2019  CMV pos, EBV pending, Toxo pending MRN:  [id number] CMV neg, EBV pos, VZV pos   Physician: Klever Ernst MD  Transplant Date: 6/13/2018    Time Post  Transplant Procedures to Expect this Visit Visit Date   40 Weeks  Month 10 Labs & Biopsy, CMV, EBV. Clinic appt with MD/NP. Date Change  Friday, 3/22/19   1st Annual 2 day visit: Extended Labs with DSAs, EBV, CMV, X-rays, DEXA, ECHO/MRI, Angiogram/RHC/IVUS/Biopsy, CPX, EKG.  Clinic appt with MD/NP. [facility], [facility].  Friday, 6/7/19 &  Monday, 6/10/19   * Labs, including Prograf level, will be drawn in the [facility] Waiting Room prior to the biopsy unless indicated.  * Please take medication evening before to ensure 12-hour trough the next AM. (i.e. Labs at 8:30 AM, take medication the evening before at 8:30 PM.)  * Do not take medication in AM until after blood drawn.     Wednesday  3/13/18 11:00 am PAC [facility]  4th floor     Date Change    Friday  3/22/19     (40 weeks, month 10)    Was 3/29/19 8:00 am Labs [facility]  1st floor    8:30 am Appointment with lCeo Marks NP [facility]  3rd floor    9:00 am Appointment with Eduardo Lea [facility]  3rd floor    9:30 am Shuttle to the [facility]    11:00 am Heart biopsy  (Fast for three hours before testing) [facility] waiting room  2nd floor     Wednesday  3/27/18 5:30 am or later [facility]     Monday 4/8/18 8:00 am MRCP (Abdominal MRI) [facility]  2nd floor    9:00 am Shuttle to the [facility]    9:30 am Appointment with VERONICA Fischer, CNP [facility]  4th floor     Monday 4/22/18 11:30 am Appointment with Dr. Tran [facility]  4th floor

## 2019-03-07 NOTE — TELEPHONE ENCOUNTER
From: Roxana Lebron   Sent: Thursday, March 07, 2019 3:55 PM  To: '[email or web address]'  Cc: Roxana Lebron  Subject: Secure - MRI on 4/8 - [facility] Murray:    I was asked to put an MRI on your schedule after your abdominal surgery. I see that you re coming for a post-op appt on Monday, 4/8/18. I can get you in at 8:00 am for the MRI (you'll need to fast for six hours) at the [facility] Waiting Room. The scan will take an hour, and you can shuttle to the [facility] for your 9:30 am appointment with VERONICA Fischer, CNP.    Is that okay with you?    Sincerely,    Roxana Lebron  Transplant   Phone [phone]  Fax [phone]  [email or web address]    Lillie Ulloa RN [place], Karen E      Please schedule MRI for sometime after his abd surgery.         Orders Only 3/7/2019   Klever Ernst MD - [facility]      Orders Signed This Encounter  MRI Abdomen w & w/o contrast mrcp

## 2019-03-08 DIAGNOSIS — Z94.1 HEART REPLACED BY TRANSPLANT (H): Primary | ICD-10-CM

## 2019-03-13 ENCOUNTER — OFFICE VISIT (OUTPATIENT)
Dept: SURGERY | Facility: CLINIC | Age: 64
End: 2019-03-13
Payer: MEDICARE

## 2019-03-13 ENCOUNTER — ANESTHESIA EVENT (OUTPATIENT)
Dept: SURGERY | Facility: CLINIC | Age: 64
End: 2019-03-13

## 2019-03-13 ENCOUNTER — TELEPHONE (OUTPATIENT)
Dept: TRANSPLANT | Facility: CLINIC | Age: 64
End: 2019-03-13

## 2019-03-13 VITALS
WEIGHT: 169 LBS | DIASTOLIC BLOOD PRESSURE: 60 MMHG | SYSTOLIC BLOOD PRESSURE: 92 MMHG | OXYGEN SATURATION: 100 % | BODY MASS INDEX: 25.03 KG/M2 | HEIGHT: 69 IN | TEMPERATURE: 97.6 F | HEART RATE: 97 BPM

## 2019-03-13 DIAGNOSIS — Z93.2 ILEOSTOMY STATUS (H): Primary | ICD-10-CM

## 2019-03-13 DIAGNOSIS — Z01.818 PREOP EXAMINATION: ICD-10-CM

## 2019-03-13 DIAGNOSIS — Z01.818 PREOP EXAMINATION: Primary | ICD-10-CM

## 2019-03-13 RX ORDER — TRAMADOL HYDROCHLORIDE 50 MG/1
50 TABLET ORAL EVERY 6 HOURS PRN
Status: ON HOLD | COMMUNITY
End: 2019-10-28

## 2019-03-13 ASSESSMENT — LIFESTYLE VARIABLES: TOBACCO_USE: 1

## 2019-03-13 ASSESSMENT — MIFFLIN-ST. JEOR: SCORE: 1546.96
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
## 2019-03-13 NOTE — ADDENDUM NOTE
Addendum  created 03/13/19 1248 by Tracie Albrecht, VERONICA CNS    Order list changed, Order sets accessed

## 2019-03-13 NOTE — PATIENT INSTRUCTIONS
Preparing for Your Surgery      Name:  Murray Nicholson   MRN:  [id number]   :  1955   Today's Date:  3/13/2019     Arriving for surgery:  Surgery date:  2019  Arrival time:  5:45 am  Please come to:       [facility] [address]  [address] [place]  [address]    -   parking is available in front of the hospital from 5:15 am to 8:00 pm    -  Stop at the Information Desk in the lobby    -   Inform the information person that you are here for surgery. An escort to 3c will be provided. If you would not like an escort, please proceed to 3C on the 3rd floor. [phone]     What can I eat or drink?  -  You may have solid food or milk products until 8 hours prior to your surgery. (3/26/19 at 11 pm)  -  You may have water, apple juice or 7up/Sprite until 2 hours prior to your surgery.(until 5:45 am arrival time)    Which medicines can I take?        Stop Aspirin, vitamins and supplements one week prior to surgery.      Hold Ibuprofen for 24 hours and/or Naproxen for 48 hours prior to surgery.     -  Do NOT take these medications in the morning, the day of surgery:     Lisinopril      Insulin       -  Please take these medications the day of surgery:     All other usual am prescription medications       How do I prepare myself?  -  Take two showers: one the night before surgery; and one the morning of surgery.         Use Scrubcare or Hibiclens to wash from neck down.  You may use your own shampoo and conditioner. No other hair products.   -  Do NOT use lotion, powder, deodorant, or antiperspirant the day of your surgery.  -  Do NOT wear any makeup, fingernail polish or jewelry.  - Do not bring your own medications to the hospital, except for inhalers and eye drops.  -  Bring your ID and insurance card.    Questions or Concerns:  -If you have questions or concerns regarding the day of surgery, please call [phone].       -For questions after surgery please call  your surgeons office.           Influenza visitor restrictions are in force at this time.  The hospital is prohibiting the following visitors:  -Any sick persons regardless of age  -Anyone with known exposure to a communicable illness  -Anyone under the age of 5    Enhanced Recovery After Surgery     This is a team effort, including you, to get you back on your feet, eating and drinking normally and out of the hospital as quickly as possible.  The goals are: 1) NO INFECTIONS and   2) RETURN TO NORMAL DIET    How can we achieve these goals?  1) STAY ACTIVE: Walk every day before your surgery; try to increase the amount every day.  Walk after surgery as much as you can-the nurses will help you.  Walking speeds healing and gets you home quicker, you heal better at home and have less risk of infection.     2) INCENTIVE SPIROMETER: Practice your incentive spirometer 4 times per day with 5-10 repetitions each time.  Using the incentive spirometer can strengthen your muscles between your ribs and help you have a strong cough after surgery.  A more effective cough can help prevent problems with your lungs.    3) STAY HYDRATED: Drink clear liquids up until 2 hours before your surgery. We would like you to purchase a drink such as Gatorade or Ensure Clear (not the milkshake type).  Drink this before bedtime and on the way into the hospital, drink between 8-12 ounces or until you feel hydrated.  Keeping well hydrated leads to your veins being plump, you wake up faster, and you are less likely to be nauseated. Start drinking water as soon as you can after surgery and advance to clear liquids and food as tolerated.  IV fluids contain salt, drinking fluids will minimize the amount of IV fluids you need and decrease the amount of salt you get.    The most common reason for the patient to be readmitted is dehydration. Staying hydrated after you go home from the hospital is very important.  Ensure or Ensure Clear are good options to  keep you hydrated.     4) PAIN MANAGEMENT: If we minimize the amount of opioids and narcotics, and use regional blocks (which numb the area where your surgery is) along with oral pain medications; you will have less side effects of nausea and constipation. Narcotics can slow down your bowels and cause you to stay in the hospital longer.     Our goal is to keep you comfortable; eating and drinking normally and back home safely.     Using an Incentive Spirometer    An incentive spirometer is a device that helps you do deep breathing exercises. These exercises expand your lungs, aid in circulation, and help prevent pneumonia. Deep breathing exercises also help you breathe better and improve the function of your lungs by:    Keeping your lungs clear    Strengthening your breathing muscles    Helping prevent respiratory complications or problems  The incentive spirometer gives you a way to take an active part in your care. A nurse or therapist will teach you breathing exercises. To do these exercises, you will breathe in through your mouth and not your nose. The incentive spirometer only works correctly if you breathe in through your mouth.    Steps to clear lungs  Step 1. Exhale normally. Then, inhale normally.    Relax and breathe out.  Step 2. Place your lips tightly around the mouthpiece.    Make sure the device is upright and not tilted.  Step 3. Inhale as much air as you can through the mouthpiece (don't breath through your nose).    Inhale slowly and deeply.    Hold your breath long enough to keep the balls or disk raised for at least 3 to 5 seconds, or as instructed by your healthcare provider.  Step 4. Repeat the exercise regularly.  Begin using the Incentive Spirometer one week prior to your surgery, 4 times per day-5 times each.

## 2019-03-13 NOTE — ANESTHESIA PREPROCEDURE EVALUATION
Anesthesia Pre-Procedure Evaluation    Patient: Murray Nicholson   MRN:     [id number] Gender:   male   Age:    64 year old :      1955        Preoperative Diagnosis: * No surgery found *        Past Medical History:   Diagnosis Date     (HFpEF) heart failure with preserved ejection fraction (H)      Allergic rhinitis, cause unspecified      Anemia of chronic kidney failure      AS (aortic stenosis)      Ascending aortic aneurysm (H)      Bicuspid aortic valve      CAD (coronary artery disease)      Chronic kidney disease, stage 5 (H)      Congestive heart failure, unspecified      Dialysis patient (H)     Tues-Thur-Sat     Dyslipidemia      Esophageal reflux      ESRD (end stage renal disease) (H)      Hearing problem      Heart replaced by transplant (H) 12/10/2018     Hypersomnia with sleep apnea, unspecified      Hypertension      Ileostomy status (H)      Immunosuppression (H)      Kidney problem      MGUS (monoclonal gammopathy of unknown significance)      Mitral regurgitation      SHEELA (obstructive sleep apnea)     No CPAP     Pneumonia      Systolic heart failure (H)      Type 2 diabetes mellitus (H)       Past Surgical History:   Procedure Laterality Date     CARDIAC SURGERY       COLONOSCOPY N/A 5/3/2018    Procedure: COLONOSCOPY;  colonoscopy ;  Surgeon: Ammon Castillo MD;  Location:  GI     CV HEART BIOPSY N/A 2019    Procedure: HBX;  Surgeon: Montrell Posada MD;  Location:  HEART CARDIAC CATH LAB     ESOPHAGOSCOPY, GASTROSCOPY, DUODENOSCOPY (EGD), COMBINED N/A 2018    Procedure: COMBINED ENDOSCOPIC ULTRASOUND, ESOPHAGOSCOPY, GASTROSCOPY, DUODENOSCOPY (EGD), FINE NEEDLE ASPIRATE/BIOPSY;  Endoscopic Ultrasound with Fine Needle Aspiration ;  Surgeon: Alon Don MD;  Location:  OR     EXAM UNDER ANESTHESIA RECTUM N/A 2018    Procedure: EXAM UNDER ANESTHESIA RECTUM;  EXAM UNDER ANESTHESIA RECTUM ,COMBINED INCISION AND DRAINAGE OF RECTAL ABCESS ;  Surgeon: Chelsea  Rick Izaguirre MD;  Location: [facility] OR     INCISION AND DRAINAGE RECTUM, COMBINED N/A 8/12/2018    Procedure: COMBINED INCISION AND DRAINAGE RECTUM;;  Surgeon: Rick Tran MD;  Location: [facility] OR     LAPAROSCOPIC ASSISTED COLOSTOMY TAKEDOWN N/A 12/11/2018    Procedure: Laparoscopic Assisted Colostomy Takedown, Laparoscopic Lysis of Adhesions;  Surgeon: Rick Tran MD;  Location: [facility] OR     LAPAROSCOPIC ASSISTED SIGMOID COLECTOMY N/A 8/14/2018    Procedure: LAPAROSCOPIC ASSISTED SIGMOID COLECTOMY;  Laparoscopic Hand Assisted Takedown of Splenic Flexure, Sigmoidectomy, Small Bowel Resection, Takedown of Small Bowel to Colon Fistula;  Surgeon: Rick Tran MD;  Location: [facility] OR     LAPAROSCOPIC HERNIORRHAPHY INGUINAL BILATERAL Bilateral 7/24/2015    Procedure: LAPAROSCOPIC HERNIORRHAPHY INGUINAL BILATERAL;  Surgeon: Bobby Mcconnell MD;  Location: [facility] OR     LAPAROSCOPIC INSERTION CATHETER PERITONEAL DIALYSIS N/A 6/22/2017    Procedure: LAPAROSCOPIC INSERTION CATHETER PERITONEAL DIALYSIS;  Laparoscopic Peritoneal Dialysis Catheter Placement - Anesthesia with block;  Surgeon: Esteban Arvizu MD;  Location: [facility] OR     PICC INSERTION Left 04/22/2018    5Fr - 49cm (3cm external), Basilic vein, low SVC     REMOVE CATHETER PERITONEAL Right 1/15/2018    Procedure: REMOVE CATHETER PERITONEAL;  Open Removal of Peritoneal Dialysis Catheter ;  Surgeon: Esteban Arvizu MD;  Location: [facility] OR     SIGMOIDOSCOPY FLEXIBLE N/A 11/21/2018    Procedure: Examination Under Anesthesia, Flexible Sigmoidoscopy and Polypectomy;  Surgeon: Rick Tran MD;  Location: [facility] OR     SIGMOIDOSCOPY FLEXIBLE N/A 12/11/2018    Procedure: Flexible Sigmoidoscopy;  Surgeon: Rick Tran MD;  Location: [facility] OR     TRANSPLANT HEART RECIPIENT N/A 6/14/2018    Procedure: TRANSPLANT HEART RECIPIENT;  Median Sternotomy, on-pump oxygenator, Heart Transplant;  Surgeon: Rony Caputo MD;  Location:  [facility] OR          Anesthesia Evaluation     . Pt has had prior anesthetic. Type: General and MAC    No history of anesthetic complications          ROS/MED HX    ENT/Pulmonary:     (+)sleep apnea, allergic rhinitis, tobacco use, Past use doesn't use CPAP , . .    Neurologic:     (+)other neuro carpal tunnel left hand    Cardiovascular: Comment: Heart transplant 6/2018    (+) Dyslipidemia, hypertension-range: home 115-130/80-90, ---. Taking blood thinners Pt has received instructions: Instructions Given to patient: Planned 5 day hold for surgery. . . :. . Previous cardiac testing Echodate:12/3/18results:date: results:ECG reviewed date:2018 results:ST, nonspecific T wave abnormality date: results:          METS/Exercise Tolerance:  >4 METS   Hematologic:     (+) History of blood clots pt is not anticoagulated, History of Transfusion no previous transfusion reaction Other Hematologic Disorder-monoclonal gammanopathy of unknown significance      Musculoskeletal:  - neg musculoskeletal ROS       GI/Hepatic:     (+) GERD Asymptomatic on medication, Other GI/Hepatic ileostomy status      Renal/Genitourinary:     (+) chronic renal disease, type: ESRD, Pt requires dialysis, type: Hemodialysis, Pt has no history of transplant,       Endo:     (+) type II DM Last HgA1c: 6.1 date: 12/2018 Using insulin - not using insulin pump Normal glucose range: 112-120 not previously admitted for DM/DKA .      Psychiatric:  - neg psychiatric ROS       Infectious Disease:   (+) VRE,       Malignancy:      - no malignancy   Other:    (+) No chance of pregnancy C-spine cleared: N/A, no H/O Chronic Pain,no other significant disability                        PHYSICAL EXAM:   Mental Status/Neuro: A/A/O; Age Appropriate   Airway: Facies: Feasible  Mallampati: I  Mouth/Opening: Full  TM distance: > 6 cm  Neck ROM: Full   Respiratory: Auscultation: CTAB     Resp. Rate: Normal     Resp. Effort: Normal      CV: Rhythm: Regular  Heart: Normal Sounds  "  Comments:      Dental: Normal                  Lab Results   Component Value Date    WBC 4.1 02/19/2019    HGB 11.5 (L) 02/19/2019    HCT 36.7 (L) 02/19/2019     02/19/2019    CRP 4.3 12/28/2018    SED 33 (H) 09/17/2018     (L) 02/19/2019    POTASSIUM 5.4 (H) 02/19/2019    CHLORIDE 102 02/19/2019    CO2 20 02/19/2019    BUN 45 (H) 02/19/2019    CR 7.68 (H) 02/19/2019     (H) 02/19/2019    TRINI 9.5 02/19/2019    PHOS 2.5 02/01/2019    MAG 1.4 (L) 02/01/2019    ALBUMIN 3.4 02/19/2019    PROTTOTAL 7.2 02/19/2019    ALT 23 02/19/2019    AST 27 02/19/2019    ALKPHOS 350 (H) 02/19/2019    BILITOTAL 0.7 02/19/2019    AMYLASE 62 06/14/2018    PTT 37 08/12/2018    INR 1.05 09/10/2018    FIBR 407 08/12/2018    TSH 2.25 04/16/2018       Preop Vitals  BP Readings from Last 3 Encounters:   02/25/19 129/73   02/21/19 (!) 158/106   02/04/19 132/86    Pulse Readings from Last 3 Encounters:   02/25/19 90   02/21/19 81   02/04/19 99      Resp Readings from Last 3 Encounters:   02/26/19 16   02/25/19 16   02/21/19 8    SpO2 Readings from Last 3 Encounters:   02/25/19 99%   02/21/19 96%   02/04/19 100%      Temp Readings from Last 1 Encounters:   02/25/19 97.5  F (36.4  C) (Oral)    Ht Readings from Last 1 Encounters:   02/26/19 1.753 m (5' 9\")      Wt Readings from Last 1 Encounters:   02/26/19 73 kg (161 lb)    Estimated body mass index is 23.78 kg/m  as calculated from the following:    Height as of 2/26/19: 1.753 m (5' 9\").    Weight as of 2/26/19: 73 kg (161 lb).     LDA:  Peripheral IV 02/21/19 Right Hand (Active)   Site Assessment [facility] 2/21/2019  9:00 AM   Line Status Saline locked 2/21/2019  7:33 AM   Phlebitis Scale 0-->no symptoms 2/21/2019  9:00 AM   Infiltration Scale 0 2/21/2019  9:00 AM   Extravasation? No 2/21/2019  9:00 AM   Dressing Intervention New dressing  2/21/2019  7:33 AM   Number of days: 20       CVC Double Lumen 04/17/18 Right Subclavian (Active)   Site Assessment [facility] 2/21/2019  9:00 AM "   Lumen Soln/Vol REFERENCE 2.1/2.1 12/15/2018 11:46 AM   External Cath Length (cm) 4 cm 12/13/2018 10:45 AM   Dressing Intervention Chlorhexidine sponge;New dressing 12/13/2018 10:45 AM   Dressing Change Due 12/20/18 12/17/2018  2:00 PM   CVC Comment CDI 12/17/2018  2:00 PM   Lumen A - Color RED 12/17/2018  7:00 AM   Lumen B - Color BLUE 12/17/2018  7:00 AM   Extravasation? No 9/11/2018  4:45 PM   Number of days: 330       Ileostomy (Active)   Stomal Appliance 1 piece 12/17/2018  8:00 AM   Stoma Assessment [place] 12/17/2018  8:00 AM   Peristomal Assessment UTV 12/17/2018  8:00 AM   Stool Amount Small 12/17/2018  8:00 AM   Output (ml) 250 ml 12/16/2018 11:00 AM   Number of days: 92            JZG FV AN PLAN NO PONV RULE         PAC Discussion and Assessment    ASA Classification: 3  Case is suitable for: [place]  Anesthetic techniques and relevant risks discussed: GA and GA with regional block for post-op pain control  Invasive monitoring and risk discussed: No  Types:   Possibility and Risk of blood transfusion discussed: Yes  NPO instructions given:   Additional anesthetic preparation and risks discussed:   Needs early admission to pre-op area:   Other:     PAC Resident/NP Anesthesia Assessment:  Murray Nicholson is a 64 year old male scheduled to undergo Ileostomy takedown with Dr. Tran on 3/27/19. He has the following specific operative considerations:   - RCRI : 6.6%  risk of major adverse cardiac event.   - VTE risk: 3%  - Risk of PONV score = 2.  If > 2, anti-emetic intervention recommended.    Multiple anesthesia records available.     --Ileostomy status after perforation of the small bowel requiring surgery. Above procedure now planned.  --HLD. Atorvastatin. HTN. Will take Amlodipine and Hydralazine on DOS. Clonidine at HS. Will hold Lisinopril on DOS. Heart transplant on 6/14/18, stable on immunosuppression and will take on DOS. All testing above. Good exercise tolerance. ASA 81 and will hold for 5 days  prior to surgery.   --Former 19 pack year smoker. Denies pulmonary symptoms. Albuterol prn. SHEELA but does not use CPAP.   --GERD. Will take Protonix on DOS.   --ESRD secondary to DM and HYPERTENSION. On dialysis T, Th, Sat. Last Cr  7.68. Awaiting kidney transplant.   --DIABETES MELLITUS  II. A1C 6.1. Novolog per sliding scale and will hold on DOS.   --History of blood transfusion, last 9/2018. No antibodies to date. Type and screen to be updated on 3/22/19.  --Pain management. Discussed possibility of nerve block if appropriate for patient's procedure. Final counseling and decisions by regional team on DOS. Patient prefers Tramadol for pain. Does not feel that Dilaudid works for him.   Enhanced recovery pathway initiated.     Patient was discussed with Dr Howard.        Reviewed and Signed by PAC Mid-Level Provider/Resident  Mid-Level Provider/Resident: VERONICA Kent, CNS  Date: 3/13/19  Time: 11:00am    Attending Anesthesiologist Anesthesia Assessment:        Anesthesiologist:   Date:   Time:   Pass/Fail:   Disposition:     PAC Pharmacist Assessment:        Pharmacist:   Date:   Time:        VERONICA Martinez CNS

## 2019-03-13 NOTE — TELEPHONE ENCOUNTER
Pt called to report that [facility] wants to use TPA in his line as it is sluggish and to ask if it's okay to proceed with this at HD 3/14. Instructed pt that it is okay to use TPA in his line. Pt verbalized understanding.

## 2019-03-13 NOTE — PROGRESS NOTES
Preoperative Assessment Center medication history for March 13, 2019 is complete.    See Epic admission navigator for prior to admission medications.   Operating room staff will still need to confirm medications and last dose information on day of surgery.     Medication history interview sources:  Patient Interview    Changes made to PTA medication list (reason)  Added:   -- tramadol 50mg by mouth every 6 hour as needed     Deleted:   -- kenalog - not using  -- urea 40% - not using  -- simethicone - not using   -- ondansetron - not using  -- loperamide - not using  -- humulin - off months ago  -- clotrimazole - no longer using    Changed: None    Additional medication history information (including reliability of information, actions taken by pharmacist):None    -- No recent (within 30 days) course of antibiotics  -- No recent (within 30 days) course of steroids  -- No recent (within 30 days) chronic daily medications stopped   -- Patient declines being on any other prescription or over-the-counter medications    Prior to Admission medications    Medication Sig Last Dose Taking? Auth Provider   amLODIPine (NORVASC) 10 MG tablet Take 1 tablet (10 mg) by mouth daily Taking Yes Klever Ernst MD   aspirin 81 MG EC tablet Take 81 mg by mouth every evening  Taking Yes Reported, Patient   atorvastatin (LIPITOR) 40 MG tablet Take 1 tablet (40 mg) by mouth daily Taking Yes Klever Ernst MD   biotin (BIOTIN 5000) 5 MG CAPS Take 5 mg by mouth daily Taking Yes Jennifer Reid PA-C   cloNIDine (CATAPRES) 0.1 MG tablet Take 1 tablet (0.1 mg) by mouth At Bedtime Taking Yes Klever Ernst MD   fluticasone (FLONASE) 50 MCG/ACT spray Spray 1-2 sprays into both nostrils daily  Patient taking differently: Spray 1-2 sprays into both nostrils daily as needed  Taking Yes Klever Ernst MD   hydrALAZINE (APRESOLINE) 100 MG TABS tablet Take 1 tablet (100 mg) by mouth every 8 hours Taking Yes Klever  "MD Klever   insulin aspart (NOVOLOG PEN) 100 UNIT/ML injection Inject 1-7 Units Subcutaneous 4 times daily (before meals and nightly)  Patient taking differently: Inject 1-7 Units Subcutaneous 4 times daily (before meals and nightly) Sliding scale Taking Yes Klever Ernst MD   lisinopril (PRINIVIL/ZESTRIL) 10 MG tablet Take 1 tablet (10 mg) by mouth daily Taking Yes Ana Luisa Mcpherson MD   mycophenolate (GENERIC EQUIVALENT) 250 MG capsule Take 4 capsules (1,000 mg) by mouth 2 times daily Taking Yes Klever Ernst MD   NEPHROCAPS 1 MG capsule Take 1 capsule by mouth daily Taking Yes Mary Alice Andre APRN CNP   pantoprazole (PROTONIX) 40 MG EC tablet Take 1 tablet (40 mg) by mouth 2 times daily (before meals) Taking Yes Klever Ernst MD   tacrolimus (GENERIC EQUIVALENT) 1 MG capsule Take two capsules in the AM (2 mg) and one capsule in the PM (1 mg) Taking Yes Klever Ernst MD   albuterol (PROAIR HFA/PROVENTIL HFA/VENTOLIN HFA) 108 (90 Base) MCG/ACT inhaler Inhale 2 puffs into the lungs every 4 hours as needed for shortness of breath / dyspnea or wheezing Not Taking  Unknown, Entered By History   blood glucose monitoring (ACCU-CHEK FASTCLIX) lancets Use to test blood sugar 2-3 times daily or as directed.  Ok to substitute alternative if insurance prefers.   Yahir Turcios MD   blood glucose monitoring (NO BRAND SPECIFIED) test strip Use to test blood sugar 2-3 times daily or as directed.   Mellisa Suh APRN CNP   loratadine (CLARITIN) 10 MG tablet Take 10 mg by mouth daily as needed Reported on 5/3/2017 Not Taking  Reported, Patient   melatonin 1 MG TABS tablet Take 2 tablets (2 mg) by mouth nightly as needed for sleep  Patient not taking: Reported on 3/13/2019 Not Taking  Mellisa Suh APRN CNP   order for DME [employer]       1 piece convex light [place] cut up to 1  \" with filter #[id number]       Or  [place]         1 piece soft convex 2 1/8\" #[id number]        Accessories   2\" " barrier ring #[id number]                           Adapt paste #[id number]                           Adapt powder #[id number]                          No sting film barrier # 7915                          Brava elastic barrier strips curved # [id number]    Treatment Diagnosis: New ileostomy   Susie Linares MD         Medication history completed by: Cesar Shannon RP

## 2019-03-18 ENCOUNTER — PRE VISIT (OUTPATIENT)
Dept: TRANSPLANT | Facility: CLINIC | Age: 64
End: 2019-03-18

## 2019-03-18 DIAGNOSIS — Z94.1 HEART REPLACED BY TRANSPLANT (H): Primary | ICD-10-CM

## 2019-03-18 DIAGNOSIS — E11.9 DIABETES MELLITUS (H): ICD-10-CM

## 2019-03-18 RX ORDER — LIDOCAINE 40 MG/G
CREAM TOPICAL
Status: CANCELLED | OUTPATIENT
Start: 2019-03-18

## 2019-03-18 NOTE — TELEPHONE ENCOUNTER
From: Murray Nicholson via [employer] Secure Mail [mailto:[email or web address]]   Sent: Monday, March 18, 2019 9:59 AM  To: Roxana Lebron  Subject: RE: Secure - MRI on 4/8 - PHI    yes that is fine I'll be there. Thank You.

## 2019-03-22 ENCOUNTER — OFFICE VISIT (OUTPATIENT)
Dept: CARDIOLOGY | Facility: CLINIC | Age: 64
End: 2019-03-22
Attending: NURSE PRACTITIONER
Payer: MEDICARE

## 2019-03-22 ENCOUNTER — APPOINTMENT (OUTPATIENT)
Dept: MEDSURG UNIT | Facility: CLINIC | Age: 64
End: 2019-03-22
Attending: NURSE PRACTITIONER
Payer: MEDICARE

## 2019-03-22 ENCOUNTER — TELEPHONE (OUTPATIENT)
Dept: TRANSPLANT | Facility: CLINIC | Age: 64
End: 2019-03-22

## 2019-03-22 ENCOUNTER — HOSPITAL ENCOUNTER (OUTPATIENT)
Facility: CLINIC | Age: 64
Discharge: HOME | End: 2019-03-22
Attending: INTERNAL MEDICINE | Admitting: INTERNAL MEDICINE
Payer: MEDICARE

## 2019-03-22 VITALS
DIASTOLIC BLOOD PRESSURE: 74 MMHG | HEART RATE: 83 BPM | HEIGHT: 69 IN | OXYGEN SATURATION: 100 % | SYSTOLIC BLOOD PRESSURE: 113 MMHG | BODY MASS INDEX: 23.16 KG/M2 | WEIGHT: 156.4 LBS

## 2019-03-22 VITALS
DIASTOLIC BLOOD PRESSURE: 71 MMHG | OXYGEN SATURATION: 100 % | RESPIRATION RATE: 18 BRPM | SYSTOLIC BLOOD PRESSURE: 140 MMHG

## 2019-03-22 DIAGNOSIS — Z94.1 HEART TRANSPLANTED (H): ICD-10-CM

## 2019-03-22 DIAGNOSIS — Z94.1 HEART REPLACED BY TRANSPLANT (H): ICD-10-CM

## 2019-03-22 DIAGNOSIS — Z94.1 HEART TRANSPLANT RECIPIENT (H): ICD-10-CM

## 2019-03-22 DIAGNOSIS — Z93.2 ILEOSTOMY STATUS (H): ICD-10-CM

## 2019-03-22 DIAGNOSIS — E11.9 DIABETES MELLITUS (H): ICD-10-CM

## 2019-03-22 DIAGNOSIS — Z94.1 HEART REPLACED BY TRANSPLANT (H): Primary | ICD-10-CM

## 2019-03-22 LAB
ABO + RH BLD: NORMAL
ABO + RH BLD: NORMAL
ANION GAP SERPL CALCULATED.3IONS-SCNC: 9 MMOL/L (ref 3–14)
BASOPHILS # BLD AUTO: 0 10E9/L (ref 0–0.2)
BASOPHILS NFR BLD AUTO: 1.3 %
BLD GP AB SCN SERPL QL: NORMAL
BLOOD BANK CMNT PATIENT-IMP: NORMAL
BUN SERPL-MCNC: 42 MG/DL (ref 7–30)
CALCIUM SERPL-MCNC: 9.2 MG/DL (ref 8.5–10.1)
CHLORIDE SERPL-SCNC: 102 MMOL/L (ref 94–109)
CMV DNA SPEC NAA+PROBE-ACNC: <137 [IU]/ML
CMV DNA SPEC NAA+PROBE-LOG#: <2.1 {LOG_IU}/ML
CO2 SERPL-SCNC: 23 MMOL/L (ref 20–32)
CREAT SERPL-MCNC: 6.91 MG/DL (ref 0.66–1.25)
CRP SERPL-MCNC: <2.9 MG/L (ref 0–8)
DIFFERENTIAL METHOD BLD: ABNORMAL
EOSINOPHIL # BLD AUTO: 0.1 10E9/L (ref 0–0.7)
EOSINOPHIL NFR BLD AUTO: 4.4 %
ERYTHROCYTE [DISTWIDTH] IN BLOOD BY AUTOMATED COUNT: 15.5 % (ref 10–15)
GFR SERPL CREATININE-BSD FRML MDRD: 8 ML/MIN/{1.73_M2}
GLUCOSE SERPL-MCNC: 114 MG/DL (ref 70–99)
HCT VFR BLD AUTO: 44.2 % (ref 40–53)
HGB BLD-MCNC: 13.3 G/DL (ref 13.3–17.7)
IMM GRANULOCYTES # BLD: 0 10E9/L (ref 0–0.4)
IMM GRANULOCYTES NFR BLD: 0 %
LYMPHOCYTES # BLD AUTO: 1 10E9/L (ref 0.8–5.3)
LYMPHOCYTES NFR BLD AUTO: 45.8 %
MAGNESIUM SERPL-MCNC: 1.4 MG/DL (ref 1.6–2.3)
MCH RBC QN AUTO: 28.5 PG (ref 26.5–33)
MCHC RBC AUTO-ENTMCNC: 30.1 G/DL (ref 31.5–36.5)
MCV RBC AUTO: 95 FL (ref 78–100)
MONOCYTES # BLD AUTO: 0.5 10E9/L (ref 0–1.3)
MONOCYTES NFR BLD AUTO: 19.8 %
NEUTROPHILS # BLD AUTO: 0.7 10E9/L (ref 1.6–8.3)
NEUTROPHILS NFR BLD AUTO: 28.7 %
NRBC # BLD AUTO: 0 10*3/UL
NRBC BLD AUTO-RTO: 0 /100
PHOSPHATE SERPL-MCNC: 4.4 MG/DL (ref 2.5–4.5)
PLATELET # BLD AUTO: 165 10E9/L (ref 150–450)
POTASSIUM SERPL-SCNC: 5.3 MMOL/L (ref 3.4–5.3)
RBC # BLD AUTO: 4.66 10E12/L (ref 4.4–5.9)
SODIUM SERPL-SCNC: 134 MMOL/L (ref 133–144)
SPECIMEN EXP DATE BLD: NORMAL
SPECIMEN SOURCE: ABNORMAL
TACROLIMUS BLD-MCNC: 4.9 UG/L (ref 5–15)
TME LAST DOSE: ABNORMAL H
WBC # BLD AUTO: 2.4 10E9/L (ref 4–11)

## 2019-03-22 PROCEDURE — 80048 BASIC METABOLIC PNL TOTAL CA: CPT | Performed by: NURSE PRACTITIONER

## 2019-03-22 PROCEDURE — 83735 ASSAY OF MAGNESIUM: CPT | Performed by: NURSE PRACTITIONER

## 2019-03-22 PROCEDURE — 93505 ENDOMYOCARDIAL BIOPSY: CPT | Mod: 26 | Performed by: INTERNAL MEDICINE

## 2019-03-22 PROCEDURE — 88346 IMFLUOR 1ST 1ANTB STAIN PX: CPT | Performed by: INTERNAL MEDICINE

## 2019-03-22 PROCEDURE — 27210794 ZZH OR GENERAL SUPPLY STERILE: Performed by: INTERNAL MEDICINE

## 2019-03-22 PROCEDURE — 85027 COMPLETE CBC AUTOMATED: CPT | Performed by: NURSE PRACTITIONER

## 2019-03-22 PROCEDURE — C1894 INTRO/SHEATH, NON-LASER: HCPCS | Performed by: INTERNAL MEDICINE

## 2019-03-22 PROCEDURE — 86850 RBC ANTIBODY SCREEN: CPT | Performed by: CLINICAL NURSE SPECIALIST

## 2019-03-22 PROCEDURE — 40000166 ZZH STATISTIC PP CARE STAGE 1

## 2019-03-22 PROCEDURE — 25000125 ZZHC RX 250: Performed by: INTERNAL MEDICINE

## 2019-03-22 PROCEDURE — 85004 AUTOMATED DIFF WBC COUNT: CPT | Performed by: NURSE PRACTITIONER

## 2019-03-22 PROCEDURE — 80197 ASSAY OF TACROLIMUS: CPT | Performed by: NURSE PRACTITIONER

## 2019-03-22 PROCEDURE — 86140 C-REACTIVE PROTEIN: CPT | Performed by: NURSE PRACTITIONER

## 2019-03-22 PROCEDURE — 93505 ENDOMYOCARDIAL BIOPSY: CPT | Performed by: INTERNAL MEDICINE

## 2019-03-22 PROCEDURE — 99214 OFFICE O/P EST MOD 30 MIN: CPT | Mod: 24 | Performed by: NURSE PRACTITIONER

## 2019-03-22 PROCEDURE — G0463 HOSPITAL OUTPT CLINIC VISIT: HCPCS | Mod: ZF

## 2019-03-22 PROCEDURE — 87799 DETECT AGENT NOS DNA QUANT: CPT | Performed by: NURSE PRACTITIONER

## 2019-03-22 PROCEDURE — 84100 ASSAY OF PHOSPHORUS: CPT | Performed by: NURSE PRACTITIONER

## 2019-03-22 PROCEDURE — 36415 COLL VENOUS BLD VENIPUNCTURE: CPT | Performed by: NURSE PRACTITIONER

## 2019-03-22 PROCEDURE — 88350 IMFLUOR EA ADDL 1ANTB STN PX: CPT | Performed by: INTERNAL MEDICINE

## 2019-03-22 PROCEDURE — 88307 TISSUE EXAM BY PATHOLOGIST: CPT | Performed by: INTERNAL MEDICINE

## 2019-03-22 RX ORDER — MYCOPHENOLATE MOFETIL 250 MG/1
750 CAPSULE ORAL 2 TIMES DAILY
Qty: 180 CAPSULE | Refills: 11 | Status: ON HOLD | OUTPATIENT
Start: 2019-03-22 | End: 2019-10-28

## 2019-03-22 RX ORDER — LIDOCAINE HYDROCHLORIDE 10 MG/ML
INJECTION, SOLUTION EPIDURAL; INFILTRATION; INTRACAUDAL; PERINEURAL
Status: DISCONTINUED | OUTPATIENT
Start: 2019-03-22 | End: 2019-03-22 | Stop reason: HOSPADM

## 2019-03-22 RX ORDER — TACROLIMUS 1 MG/1
CAPSULE ORAL
Qty: 120 CAPSULE | Refills: 11 | Status: SHIPPED | OUTPATIENT
Start: 2019-03-22 | End: 2019-04-10

## 2019-03-22 RX ORDER — LIDOCAINE 40 MG/G
CREAM TOPICAL
Status: DISCONTINUED | OUTPATIENT
Start: 2019-03-22 | End: 2019-03-22 | Stop reason: HOSPADM

## 2019-03-22 ASSESSMENT — MIFFLIN-ST. JEOR: SCORE: 1489.81

## 2019-03-22 ASSESSMENT — PAIN SCALES - GENERAL: PAINLEVEL: NO PAIN (0)

## 2019-03-22 NOTE — DISCHARGE INSTRUCTIONS
[facility]                        Interventional Cardiology  Discharge Instructions   Post Right Heart Cath      AFTER YOU GO HOME:    DO drink plenty of fluids    DO resume your regular diet and medications unless otherwise instructed by your Primary Physician    Do Not scrub the procedure site vigorously    No lotion or powder to the puncture site for 3 days    CALL YOUR PRIMARY PHYSICIAN IF: You may resume all normal activity.  Monitor neck site for bleeding, swelling, or voice changes. If you notice bleeding or swelling immediately apply pressure to the site and call number below to speak with Cardiology Fellow.  If you experience any changes in your breathing you should call your doctor immediately or come to the closest Emergency Department.  Do not drive yourself.    ADDITIONAL INSTRUCTIONS: Medications: You are to resume all home medications including anticoagulation therapy unless otherwise advised by your primary cardiologist or nurse coordinator.    Follow Up: Per your primary cardiology team    If you have any questions or concerns regarding your procedure site please call [phone] at anytime and ask for Cardiology Fellow on call.  They are available 24 hours a day.  You may also contact the Cardiology Clinic after hours number at [phone].                                                       Telephone Numbers [phone] Monday-Friday 8:00 am to 4:30 pm    [phone] After 4:30 pm Monday-Friday, Weekends & Holidays  Ask for Interventional Cardiologist on call. Someone is on call 24 hours/day   [facility] toll free number [phone] Monday-Friday 8:00 am to 4:30 pm   [facility] Emergency Dept [phone]

## 2019-03-22 NOTE — PROGRESS NOTES
[facility]  March 22, 2019    HPI:   Mr. Murray Nicholson is a 64yr old male with a history of NICM s/p OHT 6/14/18, HTN, HL, DMII, and ESRD (on hemodialysis since fall 2017) who presents to clinic for routine follow up.     His post-transplant course has been extremely complicated, and includes:  - leukocytosis  - RIJ thrombus infected with CoNS  - incidental pneumoperitoneum  - neutropenia  - oral mucosal ulcer secondary to neutropenia with Klebsiella infection  - pseudomonas bacteremia, resolved  - perforation of the small bowel, s/p small bowel resection and ileostomy  - end-stage renal disease requiring hemodialysis, currently listed for renal transplant     His biopsies have remained negative for rejection.  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  Last echo 12/2018 showed stable graft function with LVEF 65-70% and normal RV size and function.  Last RHC 12/2018 showed low biventricular filling pressures with RA 2, PCW 2, mPA 13, and CI 4.1.    Since his last visit, he states that he continues to feel well.  His weight remains stable, and he denies fluid retention.  He is eating well, with no nausea, vomiting, or looser ostomy output.  His BPs at home have been <130/90, but he notes that they drift up to 140-150s/80s prior to dialysis.  He notes some dizziness, which he attributes to dehydration as it resolves once he drinks more fluid.  He remains active, with no exertional concerns.  He otherwise denies chest pain, palpitations, shortness of breath, PND, orthopnea, persistent dizziness, headaches, acute vision changes, fevers, chills, cough, and sore throat.    Serostatus:  CMV D+/R-  EBV D+/R+      Patient Active Problem List    Diagnosis Date Noted     Colostomy status (H) 12/11/2018     Priority: Medium     Heart replaced by transplant (H) 12/10/2018     Priority: Medium     Added  automatically from request for surgery [id number]       Sigmoid diverticulitis 08/14/2018     Priority: Medium     Bacteremia due to Pseudomonas 08/12/2018     Priority: Medium     Heart transplant recipient (H) 07/26/2018     Priority: Medium     Fever 07/26/2018     Priority: Medium     Leukopenia 07/11/2018     Priority: Medium     Heart transplanted (H) 06/15/2018     Priority: Medium     HRD    Donor CMV + / Recipient CMV -    Donor EBV +        Anemia in stage 5 chronic kidney disease (H) 03/17/2017     Priority: Medium     Anemia, iron deficiency 02/15/2017     Priority: Medium     Type 2 diabetes mellitus with diabetic chronic kidney disease (H) 10/14/2015     Priority: Medium     Hypertension      Priority: Medium     Dyslipidemia      Priority: Medium     MGUS (monoclonal gammopathy of unknown significance)      Priority: Medium     Ascending aortic aneurysm (H)      Priority: Medium     Anemia in chronic renal disease 05/19/2015     Priority: Medium     updating diagnosis code for icd10 cutover       Anemia of chronic disease 04/12/2013     Priority: Medium     Problem list name updated by automated process. Provider to review and confirm       Hypertension goal BP (blood pressure) < 130/80 01/25/2011     Priority: Medium     Hyperlipidemia LDL goal <100 10/31/2010     Priority: Medium     Per provider       CKD (chronic kidney disease) stage 5, GFR less than 15 ml/min (H) 02/09/2010     Priority: Medium     Aortic stenosis 08/13/2008     Priority: Medium     Overview:   Bicuspid aortic valve       Pulmonary hypertension (H) 08/13/2008     Priority: Medium     Overview:   62 mm/Hg + RAP on 8/13/08 ECHO       Severe uncontrolled hypertension 08/13/2008     Priority: Medium     Allergic rhinitis 04/05/2006     Priority: Medium     Problem list name updated by automated process. Provider to review       Esophageal reflux 08/12/2004     Priority: Medium       PAST MEDICAL HISTORY:  Past Medical History:    Diagnosis Date     (HFpEF) heart failure with preserved ejection fraction (H)      Allergic rhinitis, cause unspecified      Anemia of chronic kidney failure      AS (aortic stenosis)      Ascending aortic aneurysm (H)      Bicuspid aortic valve      CAD (coronary artery disease)      Chronic kidney disease, stage 5 (H)      Congestive heart failure, unspecified      Dialysis patient (H)     Tues-Roxaneur-Sat     Dyslipidemia      Esophageal reflux      ESRD (end stage renal disease) (H)      Hearing problem      Heart replaced by transplant (H) 12/10/2018     Hypersomnia with sleep apnea, unspecified      Hypertension      Ileostomy status (H)      Immunosuppression (H)      Kidney problem      MGUS (monoclonal gammopathy of unknown significance)      Mitral regurgitation      SHEELA (obstructive sleep apnea)     No CPAP     Pneumonia      Systolic heart failure (H)      Type 2 diabetes mellitus (H)        CURRENT MEDICATIONS:  Prescription Medications as of 3/22/2019       Rx Number Disp Refills Start End Last Dispensed Date Next Fill Date Owning Pharmacy    albuterol (PROAIR HFA/PROVENTIL HFA/VENTOLIN HFA) 108 (90 Base) MCG/ACT inhaler            Sig: Inhale 2 puffs into the lungs every 4 hours as needed for shortness of breath / dyspnea or wheezing    Class: Historical    Route: Inhalation    amLODIPine (NORVASC) 10 MG tablet  90 tablet 3 10/23/2018    [facility] [place], [place] - [address] [facility]    Sig: Take 1 tablet (10 mg) by mouth daily    Class: E-Prescribe    Route: Oral    aspirin 81 MG EC tablet        [id number] [place], [place] - [address] [place], [place] [address] AT [facility]    Sig: Take 81 mg by mouth every evening     Class: Historical    Route: Oral    atorvastatin (LIPITOR) 40 MG tablet  90 tablet 3 10/9/2018    [facility] [place], [place] - [address] [facility]    Sig: Take 1 tablet (40 mg) by mouth daily    Class: E-Prescribe    Notes to  Pharmacy: 90 day supply if able.    Route: Oral    biotin (BIOTIN 5000) 5 MG CAPS  30 capsule 3 10/19/2018    [place], [place] - [address] [place] [address]    Sig: Take 5 mg by mouth daily    Class: E-Prescribe    Route: Oral    blood glucose monitoring (ACCU-CHEK FASTCLIX) lancets  102 each 11 3/19/2018    [facility] [id number] - [place], [place] - [address] [facility]    Sig: Use to test blood sugar 2-3 times daily or as directed.  Ok to substitute alternative if insurance prefers.    Class: E-Prescribe    blood glucose monitoring (NO BRAND SPECIFIED) test strip  100 each 11 7/20/2018    [address]    Sig: Use to test blood sugar 2-3 times daily or as directed.    Class: E-Prescribe    cloNIDine (CATAPRES) 0.1 MG tablet  90 tablet 3 11/6/2018    [address] 1[address]    Sig: Take 1 tablet (0.1 mg) by mouth At Bedtime    Class: E-Prescribe    Notes to Pharmacy: 90 day supply if able.    Route: Oral    fluticasone (FLONASE) 50 MCG/ACT spray  16 g 11 7/25/2018    [facility] - [place], [place] - [address] [place] [address]    Sig: [place] 1-2 sprays into both nostrils daily    Class: E-Prescribe    Route: Both Nostrils    hydrALAZINE (APRESOLINE) 100 MG TABS tablet  90 tablet 11 10/23/2018    [place], [place] - [address] [place] [address]    Sig: Take 1 tablet (100 mg) by mouth every 8 hours    Class: E-Prescribe    Route: Oral    insulin aspart (NOVOLOG PEN) 100 UNIT/ML injection  9 mL 3 10/10/2018    [address]    Sig: Inject 1-7 Units Subcutaneous 4 times daily (before meals and nightly)    Class: E-Prescribe    Route: Subcutaneous    lisinopril (PRINIVIL/ZESTRIL) 10 MG tablet  90 tablet 3 12/27/2018    [id number] [place]  [facility] [facility]    Sig: Take 1 tablet (10 mg) by mouth daily    Class: E-Prescribe    Route: Oral    loratadine (CLARITIN) 10 MG tablet            Sig: Take 10 mg by mouth daily as needed Reported on 5/3/2017    Class: Historical    Route: Oral    melatonin 1 MG TABS tablet  120 tablet 1 7/20/2018    [address] [phone]    Sig: Take 2 tablets (2 mg) by mouth nightly as needed for sleep    Class: E-Prescribe    Route: Oral    metroNIDAZOLE (FLAGYL) 500 MG tablet (Ended)  3 tablet 0 12/3/2018 12/4/2018   [id number] - [place], [place] - [address] [facility]    Sig: Take 1 tablet (500 mg) by mouth every 8 hours for 1 day prior to surgery.  Take in conjunction with Neomycin Sulfate.    Class: E-Prescribe    Notes to Pharmacy: Patients may experience nausea, but should do their best with the prep and antibiotics as it will reduce the risk of wound infection.    Route: Oral    mycophenolate (GENERIC EQUIVALENT) 250 MG capsule  240 capsule 11 2/18/2019    [facility] [id number] - [place] [place] [facility] [address] [facility]    Sig: Take 4 capsules (1,000 mg) by mouth 2 times daily    Class: E-Prescribe    Route: Oral    Prior authorization:  Closed - Other    NEPHROCAPS 1 MG capsule  120 capsule 0 7/2/2018    [address]    Sig: Take 1 capsule by mouth daily    Class: Local Print    Route: Oral    order for DME  30 each 11 12/17/2018 12/17/2019   [address]    Sig: Coloplast       1 piece convex light [place] cut up to 1  \" with filter #[id number]       Or  [place]         1 piece soft convex 2 1/8\" #[id number]        Accessories   2\" barrier ring #[id number]                           Adapt paste #[id number]                           Adapt powder #[id number]                 " "         No sting film barrier # [id number]                          Brava elastic barrier strips curved # [id number]    Treatment Diagnosis: New ileostomy    Class: Local Print    pantoprazole (PROTONIX) 40 MG EC tablet  60 tablet 11 11/12/2018    [facility] [id number] - [place], [place] - [address] [facility]    Sig: Take 1 tablet (40 mg) by mouth 2 times daily (before meals)    Class: E-Prescribe    Route: Oral    tacrolimus (GENERIC EQUIVALENT) 1 MG capsule  270 capsule 11 2/15/2019    [facility] [id number] - [place], [place] - [address] [facility]    Sig: Take two capsules in the AM (2 mg) and one capsule in the PM (1 mg)    Class: E-Prescribe    Notes to Pharmacy: 90 day supply if able    Prior authorization:  Closed - Other    traMADol (ULTRAM) 50 MG tablet            Sig: Take 50 mg by mouth every 6 hours as needed for severe pain    Class: Historical    Route: Oral          ROS:   Constitutional: No fever, chills, or sweats. Stable weight.   ENT: No visual disturbance, ear ache, epistaxis, sore throat.   Allergies/Immunologic: Negative.   Respiratory: No cough, hemoptysis.   Cardiovascular: As per HPI.   GI: As per HPI.  : No urinary frequency, dysuria, or hematuria.   Integument: Negative.   Psychiatric: Negative.   Neuro: Negative.   Endocrinology: Negative.   Musculoskeletal: Negative    Exam:  /74 (BP Location: Right arm, Patient Position: Chair, Cuff Size: Adult Regular)   Pulse 83   Ht 1.753 m (5' 9\")   Wt 70.9 kg (156 lb 6.4 oz)   SpO2 100%   BMI 23.10 kg/m    In general, the patient is a pleasant male in no apparent distress.    HEENT: NC/AT. PERRLA. EOMI.  Sclerae white, not injected.    Neck:  No adenopathy, No thyromegaly.    COR: No jugular venous distention when sitting upright.  RRR.  Normal S1 S2 splits physiologically.  No murmur, rub click, or gallop.    Lungs:  CTA. No rhonchi.    Abdomen: soft, nontender, nondistended.  No " organomegaly.  Extremities:  No clubbing, cyanosis, or LE edema.    Neuro: Alert & Oriented x 3, grossly non focal.  Integument: No open lesions, rashes, or jaundice.    Labs:  CBC RESULTS:   Lab Results   Component Value Date    WBC 2.4 (L) 03/22/2019    RBC 4.66 03/22/2019    HGB 13.3 03/22/2019    HCT 44.2 03/22/2019    MCV 95 03/22/2019    MCH 28.5 03/22/2019    MCHC 30.1 (L) 03/22/2019    RDW 15.5 (H) 03/22/2019     03/22/2019       BMP RESULTS:  Lab Results   Component Value Date     (L) 02/19/2019    POTASSIUM 5.4 (H) 02/19/2019    CHLORIDE 102 02/19/2019    CO2 20 02/19/2019    ANIONGAP 10 02/19/2019     (H) 02/19/2019    BUN 45 (H) 02/19/2019    CR 7.68 (H) 02/19/2019    GFRESTIMATED 7 (L) 02/19/2019    GFRESTBLACK 8 (L) 02/19/2019    TRINI 9.5 02/19/2019      LIPID RESULTS:  Lab Results   Component Value Date    CHOL 154 12/03/2018    HDL 65 12/03/2018    LDL 49 12/03/2018    TRIG 201 (H) 12/03/2018    CHOLHDLRATIO 5.0 08/10/2015    NHDL 89 12/03/2018       IMMUNOSUPPRESSANT LEVELS  Lab Results   Component Value Date    TACROL 7.4 02/01/2019    DOSTAC 2000 01/31/2019 02/01/2019       No components found for: CK  Lab Results   Component Value Date    MAG 1.4 (L) 02/01/2019     Lab Results   Component Value Date    A1C 6.1 (H) 12/03/2018     Lab Results   Component Value Date    PHOS 2.5 02/01/2019     Lab Results   Component Value Date    NTBNPI >175,000 (H) 07/26/2018     Lab Results   Component Value Date    SA[place]STMET SA FCS 11/13/2018    SAICELL Class I 11/13/2018    XY2HOERRO None 11/13/2018    CV5BUPJJDE None 11/13/2018    SAIREPCOM  11/13/2018      Test performed by modified procedure. Serum heat inactivated and tested   by a modified ([place]) protocol including fetal calf serum addition.   High-risk, mfi >3,000. Mod-risk, mfi 500-3,000.       Lab Results   Component Value Date    SAIITESTME SA FCS 11/13/2018    SAIICELL Class II 11/13/2018    [id number] None 11/13/2018    [id number]  None 11/13/2018    SAIIREPCOM  11/13/2018      Test performed by modified procedure. Serum heat inactivated and tested   by a modified ([place]) protocol including fetal calf serum addition.   High-risk, mfi >3,000. Mod-risk, mfi 500-3,000.       Lab Results   Component Value Date    CSPEC Plasma 02/19/2019       Diagnostic Studies:  TTE 12/3/18  Global and regional left ventricular function is hyperkinetic with an EF of  65-70%.  Right ventricular function, chamber size, wall motion, and thickness are  normal.  No significant valvular abnormalities.  The peak velocity of the tricuspid regurgitant jet is not obtainable.  The inferior vena cava was normal in size with preserved respiratory  variability.  No pericardial effusion is present.     Cor angiogram 7/18/18    RHC 12/3/18          Assessment and Plan:  Mr. Murray Nicholson is a 64yr old male with a history of NICM s/p OHT 6/14/18, HTN, HL, DMII, and ESRD (on hemodialysis since fall 2017) who presents to clinic for routine follow up.    NICM, s/p OHT 6/14/18  Donor 3-v CAD   His post-transplant course has been extremely complicated, and includes:  - leukocytosis  - RIJ thrombus infected with CoNS  - incidental pneumoperitoneum  - neutropenia  - oral mucosal ulcer secondary to neutropenia with Klebsiella infection  - pseudomonas bacteremia, resolved  - perforation of the small bowel, s/p small bowel resection and ileostomy  - end-stage renal disease requiring hemodialysis, currently listed for renal transplant     His biopsies have remained negative for rejection.  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  Last echo 12/2018 showed stable graft function with LVEF 65-70% and normal RV size and function.  Last RHC 12/2018 showed low biventricular filling pressures with RA 2, PCW 2, mPA 13, and CI 4.1.    Labs today show stable electrolytes and kidney function.  His  CBC shows WBC down to 2.4, so will add differential for further analysis.  CMV and EBV levels pending.  Tacro level pending.  He will have a biopsy following today's appt.    Mr. Nicholson continues to look well.  He remains euvolemic, if not slightly dry, on dialysis.  His BPs remain stable.      As his WBC is down, will add differential today, as we may need to decrease MMF prior to his upcoming ileostomy takedown.  Informed him that we will call him with results and any changes to plan of care.      Serostatus:  - CMV D+/R-  - EBV D+/R+     Immunosuppression:  - MMF 1000mg twice daily  - tacro, goal level 6-8     PPx:  - CAV:  Aspirin 81mg daily and atorvastatin 40mg daily  - GERD:  Pantoprazole 40mg twice daily  - Osteoporosis:  defer calcium/vitamin D supplements per renal team     Graft function:  - HR:  >80  - BPs:  Controlled, continue amlodipine 10mg daily, clonidine 0.1mg daily, hydralazine 100mg TID, lisinopril 10mg daily.  - fluid status:  Euvolemic, on HD Tu/Th/Sa     Health maintenance:  - scheduled for ileostomy takedown next week           The above was reviewed with Mr. Nicholson, who verbalized understanding and will call with further questions/concerns.     30 minutes spent face-to-face with patient, with >50% in counseling and/or coordination of care as described above.        Cleo Marks, DNP, APRN, FNP-BC  Advanced Heart Failure Nurse Practitioner  [facility]  Patient Care Team:  Yahir Turcios MD as PCP - General (Family Practice)  Arcelia Blum MD as MD (Cardiology)  Bobby Mcconnell MD as MD (Surgery)  Yahir Turcios MD as Assigned PCP  Amrita Tobin RN as Nurse Coordinator (Thoracic Surgery (Cardiothoracic Vascular Surgery))  Kristi Armas PA as Physician Assistant (Physician Assistant)  Jaky Canela as Clinic Care Coordinator  Ana Luisa Mcpherson MD as MD (Nephrology)  Lillie Ulloa, TONY as Transplant Coordinator  [facility]  Home Health (HOME HEALTH AGENCY (HHC), (HI))  Abram Moulton MD as MD (Family Practice)  [facility]

## 2019-03-22 NOTE — PROGRESS NOTES
1240 Pt arrived on 2a post heart biopsy. VSS RA. Dressing c/d/i. No pain. Discharge instructions given to pt. Pt declines food and drink. 1250 Pt discharged home.

## 2019-03-22 NOTE — NURSING NOTE
Transplant Coordinator Note  Reason for visit: 10 month follow up visit   Coordinator: Baljit Ulloa   Caregiver:  LUIS    Health concerns addressed today:  Pt seen in clinic with NP Kendrick. NP reviewed labs and POC. Pt reports overall doing well - wght down a few# - feels good. -130 at home - 140s at dialysis. WBC 2.4; pt denies increase in watery output, tired, fever/chills. Some dizziness when dehydrated. Diff added. CMV pending. Biopsy to follow.  RTC in 2 months for 1st annual.      Immunosuppressants:  MMF 1000 mg BID  FK 2/1 mg (goal 6-8) -> level pending     Preventive care:  ASA daily; Lipitor 40 mg   CMV - check prn (mismatch)  Kalpana for thrush prevention while mouth is healing  Flu shot October 2018     Labs: Reviewed; copy provided     Medication record reviewed and reconciled  Questions and concerns addressed. AVS reviewed; copy provided.    Pt verbalized an understanding of plan of care    Patient Instructions  ~Please call your coordinator at [phone] with any questions or concerns   ~Lillie will call with remaining labs, including tacrolimus level, heart biopsy results and any med changes  ~Take BP cuff to dialysis to correlate numbers  ~Return in 2 months for 1st annual!

## 2019-03-22 NOTE — PROGRESS NOTES
[facility]   Interventional Cardiology        Consenting/Education for Endomyocardial Biopsy     Patient understands as a part of routine surveillance after heart transplant we would like to perform endomyocardial biopsy.  This procedure will be performed by Dr. Fox.    Patient understands during the portion for biopsy a fine tube (catheter) is put into the vein of the groin/neck. While the catheter is in your neck/groin vein, a  Biopsy forceps  or pincers at the end of the catheter are used to take the tissue samples. This is may be repeated to get enough samples. The biopsy pieces are very small (one to two millimeters).     Patient also understands risks and complications of the procedure which include, but are not limited to bruising/swelling around the incision site, infection, bleeding, allergic reaction to local anesthetic, air embolism, arterial puncture.  Patient also understands this procedure requires moderate sedation.     Patient verbalized understanding of risks and benefits of the endomyocardial biopsy and has elected to proceed with the procedure.       Brianna Park, VERONICA, CNP  [facility] Interventional Cardiology  [phone]

## 2019-03-22 NOTE — LETTER
3/22/2019      RE: Murray Nicholson  [address]  [place] [address]       Dear Colleague,    Thank you for the opportunity to participate in the care of your patient, Murray Nicholson, at the [facility]. Please see a copy of my visit note below.    [facility]  March 22, 2019    HPI:   Mr. Murray Nicholson is a 64yr old male with a history of NICM s/p OHT 6/14/18, HTN, HL, DMII, and ESRD (on hemodialysis since fall 2017) who presents to clinic for routine follow up.     His post-transplant course has been extremely complicated, and includes:  - leukocytosis  - RIJ thrombus infected with CoNS  - incidental pneumoperitoneum  - neutropenia  - oral mucosal ulcer secondary to neutropenia with Klebsiella infection  - pseudomonas bacteremia, resolved  - perforation of the small bowel, s/p small bowel resection and ileostomy  - end-stage renal disease requiring hemodialysis, currently listed for renal transplant     His biopsies have remained negative for rejection.  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  Last echo 12/2018 showed stable graft function with LVEF 65-70% and normal RV size and function.  Last RHC 12/2018 showed low biventricular filling pressures with RA 2, PCW 2, mPA 13, and CI 4.1.    Since his last visit, he states that he continues to feel well.  His weight remains stable, and he denies fluid retention.  He is eating well, with no nausea, vomiting, or looser ostomy output.  His BPs at home have been <130/90, but he notes that they drift up to 140-150s/80s prior to dialysis.  He notes some dizziness, which he attributes to dehydration as it resolves once he drinks more fluid.  He remains active, with no exertional concerns.  He otherwise denies chest pain, palpitations, shortness of breath, PND, orthopnea, persistent  dizziness, headaches, acute vision changes, fevers, chills, cough, and sore throat.    Serostatus:  CMV D+/R-  EBV D+/R+      Patient Active Problem List    Diagnosis Date Noted     Colostomy status (H) 12/11/2018     Priority: Medium     Heart replaced by transplant (H) 12/10/2018     Priority: Medium     Added automatically from request for surgery [id number]       Sigmoid diverticulitis 08/14/2018     Priority: Medium     Bacteremia due to Pseudomonas 08/12/2018     Priority: Medium     Heart transplant recipient (H) 07/26/2018     Priority: Medium     Fever 07/26/2018     Priority: Medium     Leukopenia 07/11/2018     Priority: Medium     Heart transplanted (H) 06/15/2018     Priority: Medium     HRD    Donor CMV + / Recipient CMV -    Donor EBV +        Anemia in stage 5 chronic kidney disease (H) 03/17/2017     Priority: Medium     Anemia, iron deficiency 02/15/2017     Priority: Medium     Type 2 diabetes mellitus with diabetic chronic kidney disease (H) 10/14/2015     Priority: Medium     Hypertension      Priority: Medium     Dyslipidemia      Priority: Medium     MGUS (monoclonal gammopathy of unknown significance)      Priority: Medium     Ascending aortic aneurysm (H)      Priority: Medium     Anemia in chronic renal disease 05/19/2015     Priority: Medium     updating diagnosis code for icd10 cutover       Anemia of chronic disease 04/12/2013     Priority: Medium     Problem list name updated by automated process. Provider to review and confirm       Hypertension goal BP (blood pressure) < 130/80 01/25/2011     Priority: Medium     Hyperlipidemia LDL goal <100 10/31/2010     Priority: Medium     Per provider       CKD (chronic kidney disease) stage 5, GFR less than 15 ml/min (H) 02/09/2010     Priority: Medium     Aortic stenosis 08/13/2008     Priority: Medium     Overview:   Bicuspid aortic valve       Pulmonary hypertension (H) 08/13/2008     Priority: Medium     Overview:   62 mm/Hg + RAP on 8/13/08  ECHO       Severe uncontrolled hypertension 08/13/2008     Priority: Medium     Allergic rhinitis 04/05/2006     Priority: Medium     Problem list name updated by automated process. Provider to review       Esophageal reflux 08/12/2004     Priority: Medium       PAST MEDICAL HISTORY:  Past Medical History:   Diagnosis Date     (HFpEF) heart failure with preserved ejection fraction (H)      Allergic rhinitis, cause unspecified      Anemia of chronic kidney failure      AS (aortic stenosis)      Ascending aortic aneurysm (H)      Bicuspid aortic valve      CAD (coronary artery disease)      Chronic kidney disease, stage 5 (H)      Congestive heart failure, unspecified      Dialysis patient (H)     Tues-Thur-Sat     Dyslipidemia      Esophageal reflux      ESRD (end stage renal disease) (H)      Hearing problem      Heart replaced by transplant (H) 12/10/2018     Hypersomnia with sleep apnea, unspecified      Hypertension      Ileostomy status (H)      Immunosuppression (H)      Kidney problem      MGUS (monoclonal gammopathy of unknown significance)      Mitral regurgitation      SHEELA (obstructive sleep apnea)     No CPAP     Pneumonia      Systolic heart failure (H)      Type 2 diabetes mellitus (H)        CURRENT MEDICATIONS:  Prescription Medications as of 3/22/2019       Rx Number Disp Refills Start End Last Dispensed Date Next Fill Date Owning Pharmacy    albuterol (PROAIR HFA/PROVENTIL HFA/VENTOLIN HFA) 108 (90 Base) MCG/ACT inhaler            Sig: Inhale 2 puffs into the lungs every 4 hours as needed for shortness of breath / dyspnea or wheezing    Class: Historical    Route: Inhalation    amLODIPine (NORVASC) 10 MG tablet  90 tablet 3 10/23/2018    [facility] - [place], [place] - [address] [facility]    Sig: Take 1 tablet (10 mg) by mouth daily    Class: E-Prescribe    Route: Oral    aspirin 81 MG EC tablet        [id number] - [place], [place] - [address] [place] [address] &  [facility]    Sig: Take 81 mg by mouth every evening     Class: Historical    Route: Oral    atorvastatin (LIPITOR) 40 MG tablet  90 tablet 3 10/9/2018    [address]    Sig: Take 1 tablet (40 mg) by mouth daily    Class: E-Prescribe    Notes to Pharmacy: 90 day supply if able.    Route: Oral    biotin (BIOTIN 5000) 5 MG CAPS  30 capsule 3 10/19/2018    [address]    Sig: Take 5 mg by mouth daily    Class: E-Prescribe    Route: Oral    blood glucose monitoring (ACCU-CHEK FASTCLIX) lancets  102 each 11 3/19/2018    [id number] - [place], [place] - [address] AT [facility] & [facility]    Sig: Use to test blood sugar 2-3 times daily or as directed.  Ok to substitute alternative if insurance prefers.    Class: E-Prescribe    blood glucose monitoring (NO BRAND SPECIFIED) test strip  100 each 11 7/20/2018    [phone]    Sig: Use to test blood sugar 2-3 times daily or as directed.    Class: E-Prescribe    cloNIDine (CATAPRES) 0.1 MG tablet  90 tablet 3 11/6/2018    [phone]    Sig: Take 1 tablet (0.1 mg) by mouth At Bedtime    Class: E-Prescribe    Notes to Pharmacy: 90 day supply if able.    Route: Oral    fluticasone (FLONASE) 50 MCG/ACT spray  16 g 11 7/25/2018    [facility] - [place], [place] - [address] [place] [address]    Sig: [place] 1-2 sprays into both nostrils daily    Class: E-Prescribe    Route: Both Nostrils    hydrALAZINE (APRESOLINE) 100 MG TABS tablet  90 tablet 11 10/23/2018    [address]    Sig: Take 1 tablet (100 mg) by mouth every 8 hours    Class: E-Prescribe    Route: Oral    insulin aspart (NOVOLOG PEN) 100 UNIT/ML injection  9 mL 3 10/10/2018     [address]    Sig: Inject 1-7 Units Subcutaneous 4 times daily (before meals and nightly)    Class: E-Prescribe    Route: Subcutaneous    lisinopril (PRINIVIL/ZESTRIL) 10 MG tablet  90 tablet 3 12/27/2018    [id number] - [place], [place] - [address] [facility]    Sig: Take 1 tablet (10 mg) by mouth daily    Class: E-Prescribe    Route: Oral    loratadine (CLARITIN) 10 MG tablet            Sig: Take 10 mg by mouth daily as needed Reported on 5/3/2017    Class: Historical    Route: Oral    melatonin 1 MG TABS tablet  120 tablet 1 7/20/2018    [place] [id number] [facility] [phone]    Sig: Take 2 tablets (2 mg) by mouth nightly as needed for sleep    Class: E-Prescribe    Route: Oral    metroNIDAZOLE (FLAGYL) 500 MG tablet (Ended)  3 tablet 0 12/3/2018 12/4/2018   [facility] - [place], [place] - [address] [facility]    Sig: Take 1 tablet (500 mg) by mouth every 8 hours for 1 day prior to surgery.  Take in conjunction with Neomycin Sulfate.    Class: E-Prescribe    Notes to Pharmacy: Patients may experience nausea, but should do their best with the prep and antibiotics as it will reduce the risk of wound infection.    Route: Oral    mycophenolate (GENERIC EQUIVALENT) 250 MG capsule  240 capsule 11 2/18/2019    [facility] - [place], [place] - [address] [facility]    Sig: Take 4 capsules (1,000 mg) by mouth 2 times daily    Class: E-Prescribe    Route: Oral    Prior authorization:  Closed - Other    NEPHROCAPS 1 MG capsule  120 capsule 0 7/2/2018    [address]    Sig: Take 1 capsule by mouth daily    Class: Local Print    Route: Oral    order for DME  30 each 11 12/17/2018 12/17/2019   [place] [address]  "[facility]    Sig: Coloplast       1 piece convex light Uriel cut up to 1  \" with filter #[id number]       Or  Shashi         1 piece soft convex 2 1/8\" #[id number]        Accessories   2\" barrier ring #[id number]                           Adapt paste #[id number]                           Adapt powder #[id number]                          No sting film barrier # [id number]                          Brava elastic barrier strips curved # [id number]    Treatment Diagnosis: New ileostomy    Class: Local Print    pantoprazole (PROTONIX) 40 MG EC tablet  60 tablet 11 11/12/2018    [facility] - [place], [place] - [address] [facility]    Sig: Take 1 tablet (40 mg) by mouth 2 times daily (before meals)    Class: E-Prescribe    Route: Oral    tacrolimus (GENERIC EQUIVALENT) 1 MG capsule  270 capsule 11 2/15/2019    [facility] - [place], [place] - [address] [facility]    Sig: Take two capsules in the AM (2 mg) and one capsule in the PM (1 mg)    Class: E-Prescribe    Notes to Pharmacy: 90 day supply if able    Prior authorization:  Closed - Other    traMADol (ULTRAM) 50 MG tablet            Sig: Take 50 mg by mouth every 6 hours as needed for severe pain    Class: Historical    Route: Oral          ROS:   Constitutional: No fever, chills, or sweats. Stable weight.   ENT: No visual disturbance, ear ache, epistaxis, sore throat.   Allergies/Immunologic: Negative.   Respiratory: No cough, hemoptysis.   Cardiovascular: As per HPI.   GI: As per HPI.  : No urinary frequency, dysuria, or hematuria.   Integument: Negative.   Psychiatric: Negative.   Neuro: Negative.   Endocrinology: Negative.   Musculoskeletal: Negative    Exam:  /74 (BP Location: Right arm, Patient Position: Chair, Cuff Size: Adult Regular)   Pulse 83   Ht 1.753 m (5' 9\")   Wt 70.9 kg (156 lb 6.4 oz)   SpO2 100%   BMI 23.10 kg/m     In general, the patient is a pleasant male in no apparent distress.    HEENT: " NC/AT. PERRLA. EOMI.  Sclerae white, not injected.    Neck:  No adenopathy, No thyromegaly.    COR: No jugular venous distention when sitting upright.  RRR.  Normal S1 S2 splits physiologically.  No murmur, rub click, or gallop.    Lungs:  CTA. No rhonchi.    Abdomen: soft, nontender, nondistended.  No organomegaly.  Extremities:  No clubbing, cyanosis, or LE edema.    Neuro: Alert & Oriented x 3, grossly non focal.  Integument: No open lesions, rashes, or jaundice.    Labs:  CBC RESULTS:   Lab Results   Component Value Date    WBC 2.4 (L) 03/22/2019    RBC 4.66 03/22/2019    HGB 13.3 03/22/2019    HCT 44.2 03/22/2019    MCV 95 03/22/2019    MCH 28.5 03/22/2019    MCHC 30.1 (L) 03/22/2019    RDW 15.5 (H) 03/22/2019     03/22/2019       BMP RESULTS:  Lab Results   Component Value Date     (L) 02/19/2019    POTASSIUM 5.4 (H) 02/19/2019    CHLORIDE 102 02/19/2019    CO2 20 02/19/2019    ANIONGAP 10 02/19/2019     (H) 02/19/2019    BUN 45 (H) 02/19/2019    CR 7.68 (H) 02/19/2019    GFRESTIMATED 7 (L) 02/19/2019    GFRESTBLACK 8 (L) 02/19/2019    TRINI 9.5 02/19/2019      LIPID RESULTS:  Lab Results   Component Value Date    CHOL 154 12/03/2018    HDL 65 12/03/2018    LDL 49 12/03/2018    TRIG 201 (H) 12/03/2018    CHOLHDLRATIO 5.0 08/10/2015    NHDL 89 12/03/2018       IMMUNOSUPPRESSANT LEVELS  Lab Results   Component Value Date    TACROL 7.4 02/01/2019    DOSTAC 2000 01/31/2019 02/01/2019       No components found for: CK  Lab Results   Component Value Date    MAG 1.4 (L) 02/01/2019     Lab Results   Component Value Date    A1C 6.1 (H) 12/03/2018     Lab Results   Component Value Date    PHOS 2.5 02/01/2019     Lab Results   Component Value Date    NTBNPI >175,000 (H) 07/26/2018     Lab Results   Component Value Date    SREEDHAR SA FCS 11/13/2018    LEO Class I 11/13/2018    [id number] None 11/13/2018    [id number] None 11/13/2018    IREPCOM  11/13/2018      Test performed by modified  procedure. Serum heat inactivated and tested   by a modified (Rawlings) protocol including fetal calf serum addition.   High-risk, mfi >3,000. Mod-risk, mfi 500-3,000.       Lab Results   Component Value Date    SAIITESTME SA FCS 11/13/2018    SAIICELL Class II 11/13/2018    [id number] None 11/13/2018    PX1VKGJTLJ None 11/13/2018    SAIIREPCOM  11/13/2018      Test performed by modified procedure. Serum heat inactivated and tested   by a modified (Rawlings) protocol including fetal calf serum addition.   High-risk, mfi >3,000. Mod-risk, mfi 500-3,000.       Lab Results   Component Value Date    CSPEC Plasma 02/19/2019       Diagnostic Studies:  TTE 12/3/18  Global and regional left ventricular function is hyperkinetic with an EF of  65-70%.  Right ventricular function, chamber size, wall motion, and thickness are  normal.  No significant valvular abnormalities.  The peak velocity of the tricuspid regurgitant jet is not obtainable.  The inferior vena cava was normal in size with preserved respiratory  variability.  No pericardial effusion is present.     Cor angiogram 7/18/18    RHC 12/3/18          Assessment and Plan:  Mr. Murray Nicholson is a 64yr old male with a history of NICM s/p OHT 6/14/18, HTN, HL, DMII, and ESRD (on hemodialysis since fall 2017) who presents to clinic for routine follow up.    NICM, s/p OHT 6/14/18  Donor 3-v CAD   His post-transplant course has been extremely complicated, and includes:  - leukocytosis  - RIJ thrombus infected with CoNS  - incidental pneumoperitoneum  - neutropenia  - oral mucosal ulcer secondary to neutropenia with Klebsiella infection  - pseudomonas bacteremia, resolved  - perforation of the small bowel, s/p small bowel resection and ileostomy  - end-stage renal disease requiring hemodialysis, currently listed for renal transplant     His biopsies have remained negative for rejection.  His baseline coronary angiogram 7/2018 showed donor coronary disease in all three coronary  arteries, including a 40% mLAD lesion and 80% OM lesion.  Last echo 12/2018 showed stable graft function with LVEF 65-70% and normal RV size and function.  Last RHC 12/2018 showed low biventricular filling pressures with RA 2, PCW 2, mPA 13, and CI 4.1.    Labs today show stable electrolytes and kidney function.  His CBC shows WBC down to 2.4, so will add differential for further analysis.  CMV and EBV levels pending.  Tacro level pending.  He will have a biopsy following today's appt.    Mr. Nicholson continues to look well.  He remains euvolemic, if not slightly dry, on dialysis.  His BPs remain stable.      As his WBC is down, will add differential today, as we may need to decrease MMF prior to his upcoming ileostomy takedown.  Informed him that we will call him with results and any changes to plan of care.      Serostatus:  - CMV D+/R-  - EBV D+/R+     Immunosuppression:  - MMF 1000mg twice daily  - tacro, goal level 6-8     PPx:  - CAV:  Aspirin 81mg daily and atorvastatin 40mg daily  - GERD:  Pantoprazole 40mg twice daily  - Osteoporosis:   defer calcium/vitamin D supplements per renal team     Graft function:  - HR:  >80  - BPs:  Controlled, continue amlodipine 10mg daily, clonidine 0.1mg daily, hydralazine 100mg TID, lisinopril 10mg daily.  - fluid status:  Euvolemic, on HD Tu/Th/Sa     Health maintenance:  - scheduled for ileostomy takedown next week           The above was reviewed with Mr. Nicholson, who verbalized understanding and will call with further questions/concerns.     30 minutes spent face-to-face with patient, with >50% in counseling and/or coordination of care as described above.        Cleo Marks, DNP, APRN, FNP-BC  Advanced Heart Failure Nurse Practitioner  [facility]  Patient Care Team:  Yahir Turcios MD as PCP - General (Family Practice)  Arcelia Blum MD as MD (Cardiology)  Bobby Mcconnell MD as MD (Surgery)  Yahir Turcios MD as Assigned  PCP  Amrita Tobin, RN as Nurse Coordinator (Thoracic Surgery (Cardiothoracic Vascular Surgery))  Kristi Armas PA as Physician Assistant (Physician Assistant)  Jaky Canela as Clinic Care Coordinator  Ana Luisa Mcpherson MD as MD (Nephrology)  Lillie Ulloa RN as Transplant Coordinator  [facility] ([facility] ([place]  Abram Moulton MD as MD (Family Practice)  [facility]

## 2019-03-22 NOTE — PATIENT INSTRUCTIONS
Please call your coordinator at [phone] with any questions or concerns     Lillie will call with remaining labs, including tacrolimus level, heart biopsy results and any med changes    Take BP cuff to dialysis to correlate numbers    Return in 2 months for 1st annual!

## 2019-03-22 NOTE — IP AVS SNAPSHOT
[address] [address]                                    After Visit Summary   3/22/2019    Murray Nicholson    MRN: [id number]           After Visit Summary Signature Page    I have received my discharge instructions, and my questions have been answered. I have discussed any challenges I see with this plan with the nurse or doctor.    ..........................................................................................................................................  Patient/Patient Representative Signature      ..........................................................................................................................................  Patient Representative Print Name and Relationship to Patient    ..................................................               ................................................  Date                                   Time    ..........................................................................................................................................  Reviewed by Signature/Title    ...................................................              ..............................................  Date                                               Time          22EPIC Rev 08/18

## 2019-03-22 NOTE — TELEPHONE ENCOUNTER
Reviewed labs/med changes with patient:    Decrease MMF to 750 mg BID (WBC 2.4)   Increase Prograf from 2/1 to 2 mg BID (level 4.4) Goal 6-8.    Recheck labs in 7-10 days.    Pt verbalized understanding of all information.

## 2019-03-22 NOTE — PROGRESS NOTES
Arrived from home for a heart biopsy.  VSS.  Denies pain.  Consent obtained.  Resting in bed.  Ready for procedure.

## 2019-03-24 LAB
EBV DNA # SPEC NAA+PROBE: NORMAL {COPIES}/ML
EBV DNA SPEC NAA+PROBE-LOG#: NORMAL {LOG_COPIES}/ML

## 2019-03-25 ENCOUNTER — DOCUMENTATION ONLY (OUTPATIENT)
Dept: TRANSPLANT | Facility: CLINIC | Age: 64
End: 2019-03-25

## 2019-03-25 ENCOUNTER — PATIENT OUTREACH (OUTPATIENT)
Dept: SURGERY | Facility: CLINIC | Age: 64
End: 2019-03-25

## 2019-03-25 ENCOUNTER — CARE COORDINATION (OUTPATIENT)
Dept: TRANSPLANT | Facility: CLINIC | Age: 64
End: 2019-03-25

## 2019-03-25 ENCOUNTER — TELEPHONE (OUTPATIENT)
Dept: TRANSPLANT | Facility: CLINIC | Age: 64
End: 2019-03-25

## 2019-03-25 LAB — COPATH REPORT: NORMAL

## 2019-03-25 NOTE — PROGRESS NOTES
Patient was called and notified of his upcoming x-ray for tomorrow. Patient is to have his gastrografin enema tomorrow at 3:00 pm. Patient stated understanding of appointment date, time, and location. Patient is in agreement with this plan of care. Patient's questions and concerns were addressed to his stated satisfaction. Patient will callback directly with further questions or concerns.

## 2019-03-25 NOTE — TELEPHONE ENCOUNTER
Pt called requesting letter releasing him from jury duty. Based on heart transplant less than one year, dialysis 3x/week for 4 hours and abdom surgery 3/27 - letter provided to pt.

## 2019-03-25 NOTE — PROGRESS NOTES
Patient was called to discuss his needed x-ray prior to surgery. The direct phone number was left in a voicemail with a request for the patient to call back at his earliest convenience.     TONY Dorsey Care Coordinator  [facility]  Phone: [phone]

## 2019-03-25 NOTE — PROGRESS NOTES
Per renal, Dr Cabello   Hold on prescribing calcium with Vit D -  PTH is on the low end and his calcium is 10.2

## 2019-03-26 ENCOUNTER — ANCILLARY PROCEDURE (OUTPATIENT)
Dept: GENERAL RADIOLOGY | Facility: CLINIC | Age: 64
End: 2019-03-26
Attending: COLON & RECTAL SURGERY
Payer: MEDICARE

## 2019-03-26 ENCOUNTER — ANESTHESIA EVENT (OUTPATIENT)
Dept: SURGERY | Facility: CLINIC | Age: 64
DRG: 329 | End: 2019-03-26
Payer: MEDICARE

## 2019-03-26 DIAGNOSIS — Z98.890 POSTOPERATIVE STATE: ICD-10-CM

## 2019-03-27 ENCOUNTER — HOSPITAL ENCOUNTER (INPATIENT)
Facility: CLINIC | Age: 64
LOS: 5 days | Discharge: HOME | DRG: 329 | End: 2019-04-01
Attending: COLON & RECTAL SURGERY | Admitting: COLON & RECTAL SURGERY
Payer: MEDICARE

## 2019-03-27 ENCOUNTER — ANESTHESIA (OUTPATIENT)
Dept: SURGERY | Facility: CLINIC | Age: 64
DRG: 329 | End: 2019-03-27
Payer: MEDICARE

## 2019-03-27 DIAGNOSIS — Z93.2 ILEOSTOMY STATUS (H): Primary | ICD-10-CM

## 2019-03-27 LAB
CREAT SERPL-MCNC: 9.2 MG/DL (ref 0.66–1.25)
GFR SERPL CREATININE-BSD FRML MDRD: 5 ML/MIN/{1.73_M2}
GLUCOSE BLDC GLUCOMTR-MCNC: 144 MG/DL (ref 70–99)
GLUCOSE BLDC GLUCOMTR-MCNC: 151 MG/DL (ref 70–99)
GLUCOSE BLDC GLUCOMTR-MCNC: 151 MG/DL (ref 70–99)
GLUCOSE BLDC GLUCOMTR-MCNC: 163 MG/DL (ref 70–99)
GLUCOSE BLDC GLUCOMTR-MCNC: 184 MG/DL (ref 70–99)
GLUCOSE BLDC GLUCOMTR-MCNC: 98 MG/DL (ref 70–99)
HBA1C MFR BLD: 5.4 % (ref 0–5.6)
POTASSIUM SERPL-SCNC: 4.9 MMOL/L (ref 3.4–5.3)
POTASSIUM SERPL-SCNC: 6.1 MMOL/L (ref 3.4–5.3)
POTASSIUM SERPL-SCNC: 6.2 MMOL/L (ref 3.4–5.3)
POTASSIUM SERPL-SCNC: 7 MMOL/L (ref 3.4–5.3)

## 2019-03-27 PROCEDURE — 25000128 H RX IP 250 OP 636: Performed by: STUDENT IN AN ORGANIZED HEALTH CARE EDUCATION/TRAINING PROGRAM

## 2019-03-27 PROCEDURE — 82565 ASSAY OF CREATININE: CPT | Performed by: ANESTHESIOLOGY

## 2019-03-27 PROCEDURE — 37000009 ZZH ANESTHESIA TECHNICAL FEE, EACH ADDTL 15 MIN: Performed by: COLON & RECTAL SURGERY

## 2019-03-27 PROCEDURE — 36415 COLL VENOUS BLD VENIPUNCTURE: CPT | Performed by: ANESTHESIOLOGY

## 2019-03-27 PROCEDURE — 36000064 ZZH SURGERY LEVEL 4 EA 15 ADDTL MIN - UMMC: Performed by: COLON & RECTAL SURGERY

## 2019-03-27 PROCEDURE — 40000170 ZZH STATISTIC PRE-PROCEDURE ASSESSMENT II: Performed by: COLON & RECTAL SURGERY

## 2019-03-27 PROCEDURE — 36000062 ZZH SURGERY LEVEL 4 1ST 30 MIN - UMMC: Performed by: COLON & RECTAL SURGERY

## 2019-03-27 PROCEDURE — 25800025 ZZH RX 258: Performed by: ANESTHESIOLOGY

## 2019-03-27 PROCEDURE — C1765 ADHESION BARRIER: HCPCS | Performed by: COLON & RECTAL SURGERY

## 2019-03-27 PROCEDURE — 36415 COLL VENOUS BLD VENIPUNCTURE: CPT | Performed by: STUDENT IN AN ORGANIZED HEALTH CARE EDUCATION/TRAINING PROGRAM

## 2019-03-27 PROCEDURE — 25000128 H RX IP 250 OP 636: Performed by: ANESTHESIOLOGY

## 2019-03-27 PROCEDURE — C9290 INJ, BUPIVACAINE LIPOSOME: HCPCS | Performed by: STUDENT IN AN ORGANIZED HEALTH CARE EDUCATION/TRAINING PROGRAM

## 2019-03-27 PROCEDURE — A9270 NON-COVERED ITEM OR SERVICE: HCPCS | Mod: GY | Performed by: STUDENT IN AN ORGANIZED HEALTH CARE EDUCATION/TRAINING PROGRAM

## 2019-03-27 PROCEDURE — 88302 TISSUE EXAM BY PATHOLOGIST: CPT | Performed by: COLON & RECTAL SURGERY

## 2019-03-27 PROCEDURE — 88304 TISSUE EXAM BY PATHOLOGIST: CPT | Performed by: COLON & RECTAL SURGERY

## 2019-03-27 PROCEDURE — 25000128 H RX IP 250 OP 636: Performed by: NURSE ANESTHETIST, CERTIFIED REGISTERED

## 2019-03-27 PROCEDURE — 25000125 ZZHC RX 250: Performed by: NURSE ANESTHETIST, CERTIFIED REGISTERED

## 2019-03-27 PROCEDURE — 25000128 H RX IP 250 OP 636: Performed by: COLON & RECTAL SURGERY

## 2019-03-27 PROCEDURE — 12000001 ZZH R&B MED SURG/OB UMMC

## 2019-03-27 PROCEDURE — 71000014 ZZH RECOVERY PHASE 1 LEVEL 2 FIRST HR: Performed by: COLON & RECTAL SURGERY

## 2019-03-27 PROCEDURE — 37000008 ZZH ANESTHESIA TECHNICAL FEE, 1ST 30 MIN: Performed by: COLON & RECTAL SURGERY

## 2019-03-27 PROCEDURE — 83036 HEMOGLOBIN GLYCOSYLATED A1C: CPT | Performed by: STUDENT IN AN ORGANIZED HEALTH CARE EDUCATION/TRAINING PROGRAM

## 2019-03-27 PROCEDURE — 84132 ASSAY OF SERUM POTASSIUM: CPT | Performed by: INTERNAL MEDICINE

## 2019-03-27 PROCEDURE — 25000132 ZZH RX MED GY IP 250 OP 250 PS 637: Mod: GY | Performed by: COLON & RECTAL SURGERY

## 2019-03-27 PROCEDURE — 90937 HEMODIALYSIS REPEATED EVAL: CPT

## 2019-03-27 PROCEDURE — 84132 ASSAY OF SERUM POTASSIUM: CPT | Performed by: PHYSICIAN ASSISTANT

## 2019-03-27 PROCEDURE — A9270 NON-COVERED ITEM OR SERVICE: HCPCS | Mod: GY | Performed by: COLON & RECTAL SURGERY

## 2019-03-27 PROCEDURE — 25000565 ZZH ISOFLURANE, EA 15 MIN: Performed by: COLON & RECTAL SURGERY

## 2019-03-27 PROCEDURE — 25000131 ZZH RX MED GY IP 250 OP 636 PS 637: Mod: GY | Performed by: STUDENT IN AN ORGANIZED HEALTH CARE EDUCATION/TRAINING PROGRAM

## 2019-03-27 PROCEDURE — 84132 ASSAY OF SERUM POTASSIUM: CPT | Performed by: STUDENT IN AN ORGANIZED HEALTH CARE EDUCATION/TRAINING PROGRAM

## 2019-03-27 PROCEDURE — 25000132 ZZH RX MED GY IP 250 OP 250 PS 637: Mod: GY | Performed by: STUDENT IN AN ORGANIZED HEALTH CARE EDUCATION/TRAINING PROGRAM

## 2019-03-27 PROCEDURE — 25000132 ZZH RX MED GY IP 250 OP 250 PS 637: Mod: GY | Performed by: ANESTHESIOLOGY

## 2019-03-27 PROCEDURE — 0DBB0ZZ EXCISION OF ILEUM, OPEN APPROACH: ICD-10-PCS | Performed by: COLON & RECTAL SURGERY

## 2019-03-27 PROCEDURE — 71000015 ZZH RECOVERY PHASE 1 LEVEL 2 EA ADDTL HR: Performed by: COLON & RECTAL SURGERY

## 2019-03-27 PROCEDURE — 36415 COLL VENOUS BLD VENIPUNCTURE: CPT | Performed by: PHYSICIAN ASSISTANT

## 2019-03-27 PROCEDURE — 27210794 ZZH OR GENERAL SUPPLY STERILE: Performed by: COLON & RECTAL SURGERY

## 2019-03-27 PROCEDURE — 25800030 ZZH RX IP 258 OP 636: Performed by: NURSE ANESTHETIST, CERTIFIED REGISTERED

## 2019-03-27 PROCEDURE — 5A1D70Z PERFORMANCE OF URINARY FILTRATION, INTERMITTENT, LESS THAN 6 HOURS PER DAY: ICD-10-PCS | Performed by: COLON & RECTAL SURGERY

## 2019-03-27 PROCEDURE — 00000146 ZZHCL STATISTIC GLUCOSE BY METER IP

## 2019-03-27 PROCEDURE — 84132 ASSAY OF SERUM POTASSIUM: CPT | Performed by: ANESTHESIOLOGY

## 2019-03-27 RX ORDER — FLUTICASONE PROPIONATE 50 MCG
1-2 SPRAY, SUSPENSION (ML) NASAL DAILY PRN
Status: DISCONTINUED | OUTPATIENT
Start: 2019-03-27 | End: 2019-04-01 | Stop reason: HOSPADM

## 2019-03-27 RX ORDER — HYDROMORPHONE HCL/0.9% NACL/PF 0.2MG/0.2
.2-.4 SYRINGE (ML) INTRAVENOUS
Status: DISCONTINUED | OUTPATIENT
Start: 2019-03-27 | End: 2019-04-01 | Stop reason: HOSPADM

## 2019-03-27 RX ORDER — POTASSIUM CL/LIDO/0.9 % NACL 10MEQ/0.1L
10 INTRAVENOUS SOLUTION, PIGGYBACK (ML) INTRAVENOUS
Status: DISCONTINUED | OUTPATIENT
Start: 2019-03-27 | End: 2019-04-01 | Stop reason: HOSPADM

## 2019-03-27 RX ORDER — MYCOPHENOLATE MOFETIL 250 MG/1
750 CAPSULE ORAL 2 TIMES DAILY
Status: DISCONTINUED | OUTPATIENT
Start: 2019-03-27 | End: 2019-04-01 | Stop reason: HOSPADM

## 2019-03-27 RX ORDER — CIPROFLOXACIN 2 MG/ML
400 INJECTION, SOLUTION INTRAVENOUS SEE ADMIN INSTRUCTIONS
Status: DISCONTINUED | OUTPATIENT
Start: 2019-03-27 | End: 2019-03-27 | Stop reason: HOSPADM

## 2019-03-27 RX ORDER — PANTOPRAZOLE SODIUM 40 MG/1
40 TABLET, DELAYED RELEASE ORAL
Status: DISCONTINUED | OUTPATIENT
Start: 2019-03-28 | End: 2019-04-01 | Stop reason: HOSPADM

## 2019-03-27 RX ORDER — NALOXONE HYDROCHLORIDE 0.4 MG/ML
.1-.4 INJECTION, SOLUTION INTRAMUSCULAR; INTRAVENOUS; SUBCUTANEOUS
Status: DISCONTINUED | OUTPATIENT
Start: 2019-03-27 | End: 2019-04-01 | Stop reason: HOSPADM

## 2019-03-27 RX ORDER — LIDOCAINE 40 MG/G
CREAM TOPICAL
Status: DISCONTINUED | OUTPATIENT
Start: 2019-03-27 | End: 2019-04-01 | Stop reason: HOSPADM

## 2019-03-27 RX ORDER — LORAZEPAM 2 MG/ML
0.5 INJECTION INTRAMUSCULAR EVERY 6 HOURS PRN
Status: DISCONTINUED | OUTPATIENT
Start: 2019-03-27 | End: 2019-04-01 | Stop reason: HOSPADM

## 2019-03-27 RX ORDER — TRAMADOL HYDROCHLORIDE 50 MG/1
100 TABLET ORAL EVERY 6 HOURS PRN
Status: DISCONTINUED | OUTPATIENT
Start: 2019-03-27 | End: 2019-03-30

## 2019-03-27 RX ORDER — POTASSIUM CHLORIDE 29.8 MG/ML
20 INJECTION INTRAVENOUS
Status: DISCONTINUED | OUTPATIENT
Start: 2019-03-27 | End: 2019-04-01 | Stop reason: HOSPADM

## 2019-03-27 RX ORDER — HEPARIN SODIUM 5000 [USP'U]/.5ML
5000 INJECTION, SOLUTION INTRAVENOUS; SUBCUTANEOUS EVERY 12 HOURS
Status: DISCONTINUED | OUTPATIENT
Start: 2019-03-28 | End: 2019-04-01 | Stop reason: HOSPADM

## 2019-03-27 RX ORDER — NICOTINE POLACRILEX 4 MG
15-30 LOZENGE BUCCAL
Status: DISCONTINUED | OUTPATIENT
Start: 2019-03-27 | End: 2019-04-01 | Stop reason: HOSPADM

## 2019-03-27 RX ORDER — FENTANYL CITRATE 50 UG/ML
25-50 INJECTION, SOLUTION INTRAMUSCULAR; INTRAVENOUS
Status: DISCONTINUED | OUTPATIENT
Start: 2019-03-27 | End: 2019-03-27 | Stop reason: HOSPADM

## 2019-03-27 RX ORDER — DEXTROSE MONOHYDRATE 25 G/50ML
25-50 INJECTION, SOLUTION INTRAVENOUS
Status: DISCONTINUED | OUTPATIENT
Start: 2019-03-27 | End: 2019-04-01 | Stop reason: HOSPADM

## 2019-03-27 RX ORDER — POTASSIUM CHLORIDE 7.45 MG/ML
10 INJECTION INTRAVENOUS
Status: DISCONTINUED | OUTPATIENT
Start: 2019-03-27 | End: 2019-04-01 | Stop reason: HOSPADM

## 2019-03-27 RX ORDER — GRANISETRON HYDROCHLORIDE 1 MG/ML
1 INJECTION INTRAVENOUS ONCE
Status: COMPLETED | OUTPATIENT
Start: 2019-03-27 | End: 2019-03-27

## 2019-03-27 RX ORDER — SODIUM CHLORIDE, SODIUM LACTATE, POTASSIUM CHLORIDE, CALCIUM CHLORIDE 600; 310; 30; 20 MG/100ML; MG/100ML; MG/100ML; MG/100ML
INJECTION, SOLUTION INTRAVENOUS CONTINUOUS
Status: DISCONTINUED | OUTPATIENT
Start: 2019-03-27 | End: 2019-03-27 | Stop reason: HOSPADM

## 2019-03-27 RX ORDER — DEXTROSE MONOHYDRATE 25 G/50ML
50 INJECTION, SOLUTION INTRAVENOUS ONCE
Status: COMPLETED | OUTPATIENT
Start: 2019-03-27 | End: 2019-03-27

## 2019-03-27 RX ORDER — HYDRALAZINE HYDROCHLORIDE 20 MG/ML
2.5-5 INJECTION INTRAMUSCULAR; INTRAVENOUS EVERY 10 MIN PRN
Status: DISCONTINUED | OUTPATIENT
Start: 2019-03-27 | End: 2019-03-27 | Stop reason: HOSPADM

## 2019-03-27 RX ORDER — TRAMADOL HYDROCHLORIDE 50 MG/1
50 TABLET ORAL EVERY 6 HOURS PRN
Status: DISCONTINUED | OUTPATIENT
Start: 2019-03-27 | End: 2019-03-27

## 2019-03-27 RX ORDER — NALOXONE HYDROCHLORIDE 0.4 MG/ML
.1-.4 INJECTION, SOLUTION INTRAMUSCULAR; INTRAVENOUS; SUBCUTANEOUS
Status: DISCONTINUED | OUTPATIENT
Start: 2019-03-27 | End: 2019-03-27 | Stop reason: HOSPADM

## 2019-03-27 RX ORDER — PROPOFOL 10 MG/ML
INJECTION, EMULSION INTRAVENOUS PRN
Status: DISCONTINUED | OUTPATIENT
Start: 2019-03-27 | End: 2019-03-27

## 2019-03-27 RX ORDER — FENTANYL CITRATE 50 UG/ML
INJECTION, SOLUTION INTRAMUSCULAR; INTRAVENOUS PRN
Status: DISCONTINUED | OUTPATIENT
Start: 2019-03-27 | End: 2019-03-27

## 2019-03-27 RX ORDER — FLUMAZENIL 0.1 MG/ML
0.2 INJECTION, SOLUTION INTRAVENOUS
Status: DISCONTINUED | OUTPATIENT
Start: 2019-03-27 | End: 2019-03-27 | Stop reason: HOSPADM

## 2019-03-27 RX ORDER — CALCIUM CHLORIDE 100 MG/ML
1 INJECTION INTRAVENOUS; INTRAVENTRICULAR ONCE
Status: DISCONTINUED | OUTPATIENT
Start: 2019-03-27 | End: 2019-03-27

## 2019-03-27 RX ORDER — NALOXONE HYDROCHLORIDE 0.4 MG/ML
.1-.4 INJECTION, SOLUTION INTRAMUSCULAR; INTRAVENOUS; SUBCUTANEOUS
Status: ACTIVE | OUTPATIENT
Start: 2019-03-27 | End: 2019-03-28

## 2019-03-27 RX ORDER — LISINOPRIL 10 MG/1
10 TABLET ORAL DAILY
Status: DISCONTINUED | OUTPATIENT
Start: 2019-03-28 | End: 2019-04-01 | Stop reason: HOSPADM

## 2019-03-27 RX ORDER — METOPROLOL TARTRATE 1 MG/ML
1-2 INJECTION, SOLUTION INTRAVENOUS EVERY 5 MIN PRN
Status: DISCONTINUED | OUTPATIENT
Start: 2019-03-27 | End: 2019-03-27 | Stop reason: HOSPADM

## 2019-03-27 RX ORDER — CALCIUM GLUCONATE 94 MG/ML
1 INJECTION, SOLUTION INTRAVENOUS ONCE
Status: COMPLETED | OUTPATIENT
Start: 2019-03-27 | End: 2019-03-27

## 2019-03-27 RX ORDER — CIPROFLOXACIN 2 MG/ML
400 INJECTION, SOLUTION INTRAVENOUS
Status: COMPLETED | OUTPATIENT
Start: 2019-03-27 | End: 2019-03-27

## 2019-03-27 RX ORDER — HYDROMORPHONE HYDROCHLORIDE 1 MG/ML
.3-.5 INJECTION, SOLUTION INTRAMUSCULAR; INTRAVENOUS; SUBCUTANEOUS EVERY 5 MIN PRN
Status: DISCONTINUED | OUTPATIENT
Start: 2019-03-27 | End: 2019-03-27 | Stop reason: HOSPADM

## 2019-03-27 RX ORDER — LIDOCAINE HYDROCHLORIDE 20 MG/ML
INJECTION, SOLUTION INFILTRATION; PERINEURAL PRN
Status: DISCONTINUED | OUTPATIENT
Start: 2019-03-27 | End: 2019-03-27

## 2019-03-27 RX ORDER — AMLODIPINE BESYLATE 10 MG/1
10 TABLET ORAL DAILY
Status: DISCONTINUED | OUTPATIENT
Start: 2019-03-28 | End: 2019-04-01 | Stop reason: HOSPADM

## 2019-03-27 RX ORDER — ONDANSETRON 2 MG/ML
4 INJECTION INTRAMUSCULAR; INTRAVENOUS EVERY 30 MIN PRN
Status: COMPLETED | OUTPATIENT
Start: 2019-03-27 | End: 2019-03-27

## 2019-03-27 RX ORDER — TACROLIMUS 1 MG/1
2 CAPSULE ORAL
Status: DISCONTINUED | OUTPATIENT
Start: 2019-03-28 | End: 2019-03-27

## 2019-03-27 RX ORDER — LORATADINE 10 MG/1
10 TABLET ORAL DAILY PRN
Status: DISCONTINUED | OUTPATIENT
Start: 2019-03-27 | End: 2019-04-01 | Stop reason: HOSPADM

## 2019-03-27 RX ORDER — LIDOCAINE 40 MG/G
CREAM TOPICAL
Status: CANCELLED | OUTPATIENT
Start: 2019-03-27

## 2019-03-27 RX ORDER — ACETAMINOPHEN 500 MG
1000 TABLET ORAL 4 TIMES DAILY
Status: DISCONTINUED | OUTPATIENT
Start: 2019-03-27 | End: 2019-04-01 | Stop reason: HOSPADM

## 2019-03-27 RX ORDER — CIPROFLOXACIN 2 MG/ML
400 INJECTION, SOLUTION INTRAVENOUS EVERY 24 HOURS
Status: COMPLETED | OUTPATIENT
Start: 2019-03-28 | End: 2019-03-28

## 2019-03-27 RX ORDER — CLONIDINE HYDROCHLORIDE 0.1 MG/1
0.1 TABLET ORAL AT BEDTIME
Status: DISCONTINUED | OUTPATIENT
Start: 2019-03-27 | End: 2019-04-01 | Stop reason: HOSPADM

## 2019-03-27 RX ORDER — MAGNESIUM SULFATE HEPTAHYDRATE 40 MG/ML
4 INJECTION, SOLUTION INTRAVENOUS EVERY 4 HOURS PRN
Status: DISCONTINUED | OUTPATIENT
Start: 2019-03-27 | End: 2019-04-01 | Stop reason: HOSPADM

## 2019-03-27 RX ORDER — BUPIVACAINE HYDROCHLORIDE 2.5 MG/ML
INJECTION, SOLUTION EPIDURAL; INFILTRATION; INTRACAUDAL PRN
Status: DISCONTINUED | OUTPATIENT
Start: 2019-03-27 | End: 2019-03-27

## 2019-03-27 RX ORDER — SODIUM CHLORIDE, SODIUM LACTATE, POTASSIUM CHLORIDE, CALCIUM CHLORIDE 600; 310; 30; 20 MG/100ML; MG/100ML; MG/100ML; MG/100ML
INJECTION, SOLUTION INTRAVENOUS CONTINUOUS PRN
Status: DISCONTINUED | OUTPATIENT
Start: 2019-03-27 | End: 2019-03-27

## 2019-03-27 RX ORDER — SODIUM CHLORIDE, SODIUM LACTATE, POTASSIUM CHLORIDE, CALCIUM CHLORIDE 600; 310; 30; 20 MG/100ML; MG/100ML; MG/100ML; MG/100ML
INJECTION, SOLUTION INTRAVENOUS CONTINUOUS
Status: CANCELLED | OUTPATIENT
Start: 2019-03-27

## 2019-03-27 RX ORDER — MYCOPHENOLATE MOFETIL 250 MG/1
750 CAPSULE ORAL 2 TIMES DAILY
Status: DISCONTINUED | OUTPATIENT
Start: 2019-03-28 | End: 2019-03-27

## 2019-03-27 RX ORDER — ONDANSETRON 4 MG/1
4 TABLET, ORALLY DISINTEGRATING ORAL EVERY 30 MIN PRN
Status: COMPLETED | OUTPATIENT
Start: 2019-03-27 | End: 2019-03-27

## 2019-03-27 RX ORDER — ALBUTEROL SULFATE 90 UG/1
2 AEROSOL, METERED RESPIRATORY (INHALATION) EVERY 4 HOURS PRN
Status: DISCONTINUED | OUTPATIENT
Start: 2019-03-27 | End: 2019-04-01 | Stop reason: HOSPADM

## 2019-03-27 RX ORDER — TACROLIMUS 1 MG/1
2 CAPSULE ORAL
Status: DISCONTINUED | OUTPATIENT
Start: 2019-03-27 | End: 2019-03-29

## 2019-03-27 RX ORDER — SODIUM CHLORIDE, SODIUM LACTATE, POTASSIUM CHLORIDE, CALCIUM CHLORIDE 600; 310; 30; 20 MG/100ML; MG/100ML; MG/100ML; MG/100ML
INJECTION, SOLUTION INTRAVENOUS CONTINUOUS
Status: DISCONTINUED | OUTPATIENT
Start: 2019-03-27 | End: 2019-03-28

## 2019-03-27 RX ORDER — HYDRALAZINE HYDROCHLORIDE 100 MG/1
100 TABLET, FILM COATED ORAL
Status: DISCONTINUED | OUTPATIENT
Start: 2019-03-27 | End: 2019-04-01 | Stop reason: HOSPADM

## 2019-03-27 RX ADMIN — TRAMADOL HYDROCHLORIDE 100 MG: 50 TABLET, COATED ORAL at 22:06

## 2019-03-27 RX ADMIN — Medication 0.5 MG: at 11:09

## 2019-03-27 RX ADMIN — LIDOCAINE HYDROCHLORIDE 60 MG: 20 INJECTION, SOLUTION INFILTRATION; PERINEURAL at 07:43

## 2019-03-27 RX ADMIN — FENTANYL CITRATE 50 MCG: 50 INJECTION INTRAMUSCULAR; INTRAVENOUS at 10:18

## 2019-03-27 RX ADMIN — ROCURONIUM BROMIDE 50 MG: 10 INJECTION INTRAVENOUS at 07:43

## 2019-03-27 RX ADMIN — FENTANYL CITRATE 50 MCG: 50 INJECTION, SOLUTION INTRAMUSCULAR; INTRAVENOUS at 07:43

## 2019-03-27 RX ADMIN — FENTANYL CITRATE 50 MCG: 50 INJECTION INTRAMUSCULAR; INTRAVENOUS at 12:05

## 2019-03-27 RX ADMIN — SODIUM CHLORIDE 300 ML: 9 INJECTION, SOLUTION INTRAVENOUS at 18:37

## 2019-03-27 RX ADMIN — METRONIDAZOLE 500 MG: 500 INJECTION, SOLUTION INTRAVENOUS at 06:00

## 2019-03-27 RX ADMIN — FENTANYL CITRATE 50 MCG: 50 INJECTION, SOLUTION INTRAMUSCULAR; INTRAVENOUS at 08:55

## 2019-03-27 RX ADMIN — PHENYLEPHRINE HYDROCHLORIDE: 10 INJECTION, SOLUTION INTRAMUSCULAR; INTRAVENOUS; SUBCUTANEOUS at 08:03

## 2019-03-27 RX ADMIN — SODIUM CHLORIDE 250 ML: 9 INJECTION, SOLUTION INTRAVENOUS at 18:38

## 2019-03-27 RX ADMIN — Medication 0.2 MG: at 19:41

## 2019-03-27 RX ADMIN — Medication 0.4 MG: at 17:34

## 2019-03-27 RX ADMIN — PHENYLEPHRINE HYDROCHLORIDE 100 MCG: 10 INJECTION, SOLUTION INTRAMUSCULAR; INTRAVENOUS; SUBCUTANEOUS at 08:54

## 2019-03-27 RX ADMIN — ALTEPLASE 2 MG: 2.2 INJECTION, POWDER, LYOPHILIZED, FOR SOLUTION INTRAVENOUS at 14:58

## 2019-03-27 RX ADMIN — PROCHLORPERAZINE EDISYLATE 5 MG: 5 INJECTION INTRAMUSCULAR; INTRAVENOUS at 16:13

## 2019-03-27 RX ADMIN — GRANISETRON HYDROCHLORIDE 1 MG: 1 INJECTION INTRAVENOUS at 07:02

## 2019-03-27 RX ADMIN — FENTANYL CITRATE 25 MCG: 50 INJECTION INTRAMUSCULAR; INTRAVENOUS at 09:57

## 2019-03-27 RX ADMIN — Medication: at 18:38

## 2019-03-27 RX ADMIN — MIDAZOLAM 1 MG: 1 INJECTION INTRAMUSCULAR; INTRAVENOUS at 07:29

## 2019-03-27 RX ADMIN — Medication 0.5 MG: at 10:25

## 2019-03-27 RX ADMIN — BUPIVACAINE HYDROCHLORIDE 20 ML: 2.5 INJECTION, SOLUTION EPIDURAL; INFILTRATION; INTRACAUDAL; PERINEURAL at 06:39

## 2019-03-27 RX ADMIN — TACROLIMUS 2 MG: 1 CAPSULE ORAL at 22:44

## 2019-03-27 RX ADMIN — Medication 0.5 MG: at 10:38

## 2019-03-27 RX ADMIN — FENTANYL CITRATE 25 MCG: 50 INJECTION INTRAMUSCULAR; INTRAVENOUS at 10:05

## 2019-03-27 RX ADMIN — METRONIDAZOLE 500 MG: 500 INJECTION, SOLUTION INTRAVENOUS at 21:32

## 2019-03-27 RX ADMIN — HYDRALAZINE HYDROCHLORIDE 100 MG: 100 TABLET ORAL at 21:30

## 2019-03-27 RX ADMIN — SUGAMMADEX 200 MG: 100 INJECTION, SOLUTION INTRAVENOUS at 09:08

## 2019-03-27 RX ADMIN — PHENYLEPHRINE HYDROCHLORIDE 100 MCG: 10 INJECTION, SOLUTION INTRAMUSCULAR; INTRAVENOUS; SUBCUTANEOUS at 08:02

## 2019-03-27 RX ADMIN — ONDANSETRON 4 MG: 2 INJECTION INTRAMUSCULAR; INTRAVENOUS at 10:30

## 2019-03-27 RX ADMIN — ALTEPLASE 2 MG: 2.2 INJECTION, POWDER, LYOPHILIZED, FOR SOLUTION INTRAVENOUS at 14:57

## 2019-03-27 RX ADMIN — METRONIDAZOLE 500 MG: 500 INJECTION, SOLUTION INTRAVENOUS at 06:08

## 2019-03-27 RX ADMIN — Medication 0.5 MG: at 10:55

## 2019-03-27 RX ADMIN — FENTANYL CITRATE 25 MCG: 50 INJECTION INTRAMUSCULAR; INTRAVENOUS at 10:10

## 2019-03-27 RX ADMIN — SODIUM CHLORIDE, POTASSIUM CHLORIDE, SODIUM LACTATE AND CALCIUM CHLORIDE: 600; 310; 30; 20 INJECTION, SOLUTION INTRAVENOUS at 07:29

## 2019-03-27 RX ADMIN — MYCOPHENOLATE MOFETIL 750 MG: 250 CAPSULE ORAL at 22:44

## 2019-03-27 RX ADMIN — PROPOFOL 120 MG: 10 INJECTION, EMULSION INTRAVENOUS at 07:43

## 2019-03-27 RX ADMIN — HUMAN INSULIN 10 UNITS: 100 INJECTION, SOLUTION SUBCUTANEOUS at 14:32

## 2019-03-27 RX ADMIN — BUPIVACAINE 20 ML: 13.3 INJECTION, SUSPENSION, LIPOSOMAL INFILTRATION at 06:39

## 2019-03-27 RX ADMIN — CLONIDINE HYDROCHLORIDE 0.1 MG: 0.1 TABLET ORAL at 21:31

## 2019-03-27 RX ADMIN — FENTANYL CITRATE 50 MCG: 50 INJECTION, SOLUTION INTRAMUSCULAR; INTRAVENOUS at 08:24

## 2019-03-27 RX ADMIN — ONDANSETRON 4 MG: 2 INJECTION INTRAMUSCULAR; INTRAVENOUS at 13:45

## 2019-03-27 RX ADMIN — ENOXAPARIN SODIUM 40 MG: 40 INJECTION SUBCUTANEOUS at 06:52

## 2019-03-27 RX ADMIN — CIPROFLOXACIN 400 MG: 2 INJECTION INTRAVENOUS at 07:52

## 2019-03-27 RX ADMIN — ACETAMINOPHEN 1000 MG: 500 TABLET, FILM COATED ORAL at 21:30

## 2019-03-27 RX ADMIN — FENTANYL CITRATE 50 MCG: 50 INJECTION, SOLUTION INTRAMUSCULAR; INTRAVENOUS at 07:40

## 2019-03-27 RX ADMIN — DEXTROSE MONOHYDRATE 50 ML: 500 INJECTION PARENTERAL at 14:31

## 2019-03-27 RX ADMIN — CALCIUM GLUCONATE 1 G: 98 INJECTION, SOLUTION INTRAVENOUS at 15:26

## 2019-03-27 ASSESSMENT — MIFFLIN-ST. JEOR: SCORE: 1506.38

## 2019-03-27 ASSESSMENT — PAIN DESCRIPTION - DESCRIPTORS
DESCRIPTORS: ACHING;CONSTANT;SORE
DESCRIPTORS: ACHING;BURNING;DISCOMFORT;SORE

## 2019-03-27 ASSESSMENT — ACTIVITIES OF DAILY LIVING (ADL): ADLS_ACUITY_SCORE: 15

## 2019-03-27 NOTE — ANESTHESIA PROCEDURE NOTES
Peripheral Nerve Block Procedure Note    Staff:     Anesthesiologist:  Enedina Ames MD    Resident/CRNA:  Jelani Pace MD    Block performed by resident/CRNA in the presence of a teaching physician    Location: Pre-op  Procedure Start/Stop TImes:      3/27/2019 6:30 AM     3/27/2019 6:45 AM    patient identified, IV checked, site marked, risks and benefits discussed, informed consent, monitors and equipment checked, pre-op evaluation, at physician/surgeon's request and post-op pain management      Correct Patient: Yes      Correct Position: Yes      Correct Site: Yes      Correct Procedure: Yes      Correct Laterality:  Yes    Site Marked:  Yes  Procedure details:     Procedure:  TAP    ASA:  3    Diagnosis:  Post operative pain    Laterality:  Bilateral    Position:  Supine    Sterile Prep: chloraprep, mask and sterile gloves      Needle:  Short bevel    Needle gauge:  21    Needle length (inches):  3.13    Ultrasound: Yes      Ultrasound used to identify targeted nerve, plexus, or vascular structure and placed a needle adjacent to it      Permanent Image entered into patiient's record      Abnormal pain on injection: No      Blood Aspirated: No      Paresthesias:  No    Bleeding at site: No      Bolus via:  Needle    Infusion Method:  Single Shot    Blood aspirated via catheter: No      Complications:  None

## 2019-03-27 NOTE — OR NURSING
Pt has been vitally stable.. Pain controlled relatively well with IV narcotics. No c/o nausia .. Taking PO well .. Noted abd dressing drng to have increased.. Did alberto. Pt does have penrose drain under dressing Abdominal binder applied.

## 2019-03-27 NOTE — BRIEF OP NOTE
[facility], [place]    Brief Operative Note    Pre-operative diagnosis: Postoperative Status  Post-operative diagnosis Same  Procedure: Procedure(s):  Takedown Ileostomy  Surgeon: Surgeon(s) and Role:     * Rick Tran MD - Primary     * Lui Lott MD - Resident - Assisting  Anesthesia: Combined General with Block   Estimated blood loss: 5 mL  Drains:  Penrose into old ileostomy site, into subcutaneous space  Specimens:   ID Type Source Tests Collected by Time Destination   A : ileostomy Tissue Small Intestine, Ileum SURGICAL PATHOLOGY EXAM Rick Tran MD 3/27/2019  8:29 AM    B : hernia sac Tissue Hernia Sac SURGICAL PATHOLOGY EXAM Rick Tran MD 3/27/2019  8:45 AM      Findings:   Loop ileostomy reversed. Stapled side-to-side functional end-to-end anastomosis..  Complications: None.  Implants: None.

## 2019-03-27 NOTE — OP NOTE
PREOPERATIVE DIAGNOSIS:  1.  Loop ileostomy status.  2.  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   3.   Status post colostomy takedown with loop ileostomy.  4.   Gastroesophageal reflux disease.   5.   End-stage renal disease (on hemodialysis).   6.   Dyslipidemia.   7.   Hypertension.   8.   Hypertensive cardiomyopathy (status post heart transplantation).   9.   Iron deficiency and chronic disease anemia.   10.  Diabetes mellitus type 2 (on insulin).   11.  History of Clostridium difficile colitis.      POSTOPERATIVE DIAGNOSIS:   1.  Loop ileostomy status.  2.  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   3.   Status post colostomy takedown with loop ileostomy.  4.   Gastroesophageal reflux disease.   5.   End-stage renal disease (on hemodialysis).   6.   Dyslipidemia.   7.   Hypertension.   8.   Hypertensive cardiomyopathy (status post heart transplantation).   9.   Iron deficiency and chronic disease anemia.   10.  Diabetes mellitus type 2 (on insulin).   11.  History of Clostridium difficile colitis.      PROCEDURE:  1. Loop ileostomy takedown.  2. Small bowel, stapled, side-to-side anastomosis.  3. Repair of abdominal wall at ileostomy site.    ANESTHESIA: General endotracheal anesthesia plus bilateral TAP blocks local anesthesia.    SURGEON:  Rick Tran M.D.    ASSISTANT(S): Lui Lott M.D.    INDICATIONS FOR PROCEDURE  Murray Nicholson is a 64 year old male who is status post colostomy reversal with loop ileostomy. I thoroughly discussed the risks, benefits, and alternatives of operative treatment with the patient and he/she agreed to proceed.    General risks related to abdominal surgery were reviewed with the patient. These include, but are not  "limited to, death, myocardial infarction, pneumonia, urinary tract infection, deep venous thrombosis with or without pulmonary embolus, abdominal infection from bowel injury or abscess, fistula, anastomotic leak that may require reoperation and stoma, bowel obstruction, wound infection, and bleeding.    OPERATIVE PROCEDURE: After obtaining informed consent, the patient was brought to the operating room and placed in the supine position. Appropriate preoperative mechanical and chemical deep venous thrombosis prophylaxis, as well as preoperative prophylactic parenteral antibiotics were given. General endotracheal anesthesia was gently induced. Bilateral lower extremity pneumatic compression devices were applied and all pressure points were cushioned. The abdomen was then preped and draped in the standard sterile fashion. After a \"time-out\" was performed, a circumferential incision was made at the mucocutaneous junction of the ileostomy. A lone-star surgical retractor was placed for adequate exposure. The subcutaneous tissue was carefully dissected off the terminal ileum down to the level of the fascia. The terminal ileum was then  from the abdominal wall fascia and muscle using sharp dissection. The peritoneal cavity was then entered and any additional adhesions from the ileum to the parietal peritoneum were carefully taken down. After obtaining sufficient bowel length to perform an anastomosis, the ileostomy edges were inverted with sharp dissection. Both segments of terminal ileum were aligned, making sure to not incorporate mesentery or adjacent tissues, and a stapled side-to-side functional end-to-end ileo-ileal anastomosis was made with a NINFA 100 surgical stapler (blue load). The anastomosis was evaluated through the common enterotomy and no bleeding was visualized. After this, a TA 90 surgical stapler (blue load) was used to transect the remaining colon and terminal ileum, making sure the NINFA staple " lines were not aligned. The specimen, including the loop ileostomy edges was sent to pathology. The anastomosis appeared to be well vascularized, widely patent, and without tension or torsion. The TA staple line was reinforced with a 4-0 Monocryl running horizontal mattress suture. The crotch of the NINFA staple line was reinforced with 2 separate 4-0 Silk sutures.    The bowel was then returned to the peritoneal cavity. The peritoneal cavity was irrigated with water and suctioned out. There was no evidence of bleeding or bowel injury. Instrument, sponge, and needle counts were all correct, as reported to me. The right lower quadrant abdominal wall defect was re approximated with multiple 1 Prolene figure-of-eight sutures. The wound was irrigated and dried, and hemostasis was corroborated. The dermis was loosely re-approximated in a circular fashion with a running 2-0 Vicryl pursestring suture. A 5/8 inch Penrose drain was left in the incision and loosely secured in place with a 2-0 Nylon suture. A sterile dressing was applied. The patient tolerated the procedure well.    COMPLICATIONS: none, immediately.    ESTIMATED BLOOD LOSS: 50mL.    REPLACEMENT:     - Crystalloid: 700mL.     - Colloid: 0mL.     - Blood products: none.    SPECIMEN(S): Ileostomy and hernia sac.    OPERATIVE COUNT: Complete.    DRAINS/TUBES: 3/4 inch Penrose drain at the old ostomy wound.     OPERATIVE FINDINGS: stapled side-to-side ileo-ileal anastomosis.    Rick Tran M.D., M.Sc.    Division of Colon & Rectal Surgery  [facility]  [phone] (p)  [phone] (o)    CC:      Yahir Ernst MD       [facility] Benito Lawrence MD

## 2019-03-27 NOTE — ANESTHESIA POSTPROCEDURE EVALUATION
Anesthesia POST Procedure Evaluation    Patient: Murray Nicholson   MRN:     [id number] Gender:   male   Age:    64 year old :      1955        Preoperative Diagnosis: Postoperative Status   Procedure(s):  Takedown Ileostomy   Postop Comments: No value filed.       Anesthesia Type:  General    Reportable Event: NO     PAIN: Uncomplicated   Sign Out status: Comfortable, Well controlled pain     PONV: No PONV   Sign Out status:  No Nausea or Vomiting     Neuro/Psych: Uneventful perioperative course   Sign Out Status: Preoperative baseline; Age appropriate mentation     Airway/Resp.: Uneventful perioperative course   Sign Out Status: Non labored breathing, age appropriate RR; Resp. Status within EXPECTED Parameters     CV: Uneventful perioperative course   Sign Out status: Appropriate BP and perfusion indices; Appropriate HR/Rhythm     Disposition:   Sign Out in:  PACU  Disposition:  Floor  Recovery Course: Uneventful  Follow-Up: Not required           Last Anesthesia Record Vitals:  CRNA VITALS  3/27/2019 0846 - 3/27/2019 0946      3/27/2019             NIBP:  133/88    NIBP Mean:  110    Ht Rate:  86    SpO2:  100 %          Last PACU/Preop Vitals:  Vitals:    19 1015 19 1030 19 1045   BP: 107/60 103/59 101/61   Pulse:  89    Resp: 14 16 16   Temp:  36.7  C (98  F)    SpO2: 100% 100% 100%         Electronically Signed By: Ritesh Bianchi MD, 2019, 11:12 AM

## 2019-03-27 NOTE — OR NURSING
Writing RN paged Dr. Noel Bolanos, with [facility], to verify team is ok with patient going to 7C. Recheck potassium was 6.2 and patient will have dialysis run as soon as they can fit him in this afternoon. Per Dr. Bolanos, patient is ok to go to 7C without telemetry.
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
## 2019-03-27 NOTE — ANESTHESIA PREPROCEDURE EVALUATION
Anesthesia Pre-Procedure Evaluation    Patient: Murray Nicholson   MRN:     [id number] Gender:   male   Age:    64 year old :      1955        Preoperative Diagnosis: Postoperative Status   Procedure(s):  Takedown Ileostomy     Past Medical History:   Diagnosis Date     (HFpEF) heart failure with preserved ejection fraction (H)      Allergic rhinitis, cause unspecified      Anemia of chronic kidney failure      AS (aortic stenosis)      Ascending aortic aneurysm (H)      Bicuspid aortic valve      CAD (coronary artery disease)      Chronic kidney disease, stage 5 (H)      Congestive heart failure, unspecified      Dialysis patient (H)     Tues-Thur-Sat     Dyslipidemia      Esophageal reflux      ESRD (end stage renal disease) (H)      Hearing problem      Heart replaced by transplant (H) 12/10/2018     Hypersomnia with sleep apnea, unspecified      Hypertension      Ileostomy status (H)      Immunosuppression (H)      Kidney problem      MGUS (monoclonal gammopathy of unknown significance)      Mitral regurgitation      SHEELA (obstructive sleep apnea)     No CPAP     Pneumonia      Systolic heart failure (H)      Type 2 diabetes mellitus (H)       Past Surgical History:   Procedure Laterality Date     CARDIAC SURGERY       COLONOSCOPY N/A 5/3/2018    Procedure: COLONOSCOPY;  colonoscopy ;  Surgeon: Ammon Castillo MD;  Location:  GI     CV HEART BIOPSY N/A 2019    Procedure: HBX;  Surgeon: Montrell Posada MD;  Location:  HEART CARDIAC CATH LAB     CV HEART BIOPSY N/A 3/22/2019    Procedure: HBX, RIJV ACCESS;  Surgeon: Jordan Fox MD;  Location:  HEART CARDIAC CATH LAB     ESOPHAGOSCOPY, GASTROSCOPY, DUODENOSCOPY (EGD), COMBINED N/A 2018    Procedure: COMBINED ENDOSCOPIC ULTRASOUND, ESOPHAGOSCOPY, GASTROSCOPY, DUODENOSCOPY (EGD), FINE NEEDLE ASPIRATE/BIOPSY;  Endoscopic Ultrasound with Fine Needle Aspiration ;  Surgeon: Alon Don MD;  Location:  OR     EXAM UNDER ANESTHESIA  RECTUM N/A 8/12/2018    Procedure: EXAM UNDER ANESTHESIA RECTUM;  EXAM UNDER ANESTHESIA RECTUM ,COMBINED INCISION AND DRAINAGE OF RECTAL ABCESS ;  Surgeon: Rick Tran MD;  Location: [facility]     INCISION AND DRAINAGE RECTUM, COMBINED N/A 8/12/2018    Procedure: COMBINED INCISION AND DRAINAGE RECTUM;;  Surgeon: Rick Tran MD;  Location: [facility]     LAPAROSCOPIC ASSISTED COLOSTOMY TAKEDOWN N/A 12/11/2018    Procedure: Laparoscopic Assisted Colostomy Takedown, Laparoscopic Lysis of Adhesions;  Surgeon: Rick Tran MD;  Location: [facility]     LAPAROSCOPIC ASSISTED SIGMOID COLECTOMY N/A 8/14/2018    Procedure: LAPAROSCOPIC ASSISTED SIGMOID COLECTOMY;  Laparoscopic Hand Assisted Takedown of Splenic Flexure, Sigmoidectomy, Small Bowel Resection, Takedown of Small Bowel to Colon Fistula;  Surgeon: Rick Tran MD;  Location: [facility]     LAPAROSCOPIC HERNIORRHAPHY INGUINAL BILATERAL Bilateral 7/24/2015    Procedure: LAPAROSCOPIC HERNIORRHAPHY INGUINAL BILATERAL;  Surgeon: Bobby Mcconnell MD;  Location: [facility]     LAPAROSCOPIC INSERTION CATHETER PERITONEAL DIALYSIS N/A 6/22/2017    Procedure: LAPAROSCOPIC INSERTION CATHETER PERITONEAL DIALYSIS;  Laparoscopic Peritoneal Dialysis Catheter Placement - Anesthesia with block;  Surgeon: Esteban Arvizu MD;  Location: [facility]     PICC INSERTION Left 04/22/2018    5Fr - 49cm (3cm external), Basilic vein, low SVC     REMOVE CATHETER PERITONEAL Right 1/15/2018    Procedure: REMOVE CATHETER PERITONEAL;  Open Removal of Peritoneal Dialysis Catheter ;  Surgeon: Esteban Arvizu MD;  Location: [facility]     SIGMOIDOSCOPY FLEXIBLE N/A 11/21/2018    Procedure: Examination Under Anesthesia, Flexible Sigmoidoscopy and Polypectomy;  Surgeon: Rick Tran MD;  [facility]     SIGMOIDOSCOPY FLEXIBLE N/A 12/11/2018    Procedure: Flexible Sigmoidoscopy;  Surgeon: Rick Tran MD;  Location: [facility]     TRANSPLANT HEART  RECIPIENT N/A 6/14/2018    Procedure: TRANSPLANT HEART RECIPIENT;  Median Sternotomy, on-pump oxygenator, Heart Transplant;  Surgeon: Rony Caputo MD;  Location: [facility]          Anesthesia Evaluation     . Pt has had prior anesthetic. Type: General           ROS/MED HX    ENT/Pulmonary:       Neurologic:       Cardiovascular:         METS/Exercise Tolerance:     Hematologic:         Musculoskeletal:         GI/Hepatic:         Renal/Genitourinary:         Endo:         Psychiatric:         Infectious Disease:         Malignancy:         Other:                         PHYSICAL EXAM:   Mental Status/Neuro: A/A/O   Airway: Facies: Feasible  Mallampati: I  Mouth/Opening: Full  TM distance: > 6 cm  Neck ROM: Full   Respiratory: Auscultation: CTAB     Resp. Rate: Normal     Resp. Effort: Normal      CV: Rhythm: Regular  Rate: Age appropriate  Heart: Normal Sounds   Comments:                    Lab Results   Component Value Date    WBC 2.4 (L) 03/22/2019    HGB 13.3 03/22/2019    HCT 44.2 03/22/2019     03/22/2019    CRP <2.9 03/22/2019    SED 33 (H) 09/17/2018     03/22/2019    POTASSIUM 5.3 03/22/2019    CHLORIDE 102 03/22/2019    CO2 23 03/22/2019    BUN 42 (H) 03/22/2019    CR 6.91 (H) 03/22/2019     (H) 03/22/2019    TRINI 9.2 03/22/2019    PHOS 4.4 03/22/2019    MAG 1.4 (L) 03/22/2019    ALBUMIN 3.4 02/19/2019    PROTTOTAL 7.2 02/19/2019    ALT 23 02/19/2019    AST 27 02/19/2019    ALKPHOS 350 (H) 02/19/2019    BILITOTAL 0.7 02/19/2019    AMYLASE 62 06/14/2018    PTT 37 08/12/2018    INR 1.05 09/10/2018    FIBR 407 08/12/2018    TSH 2.25 04/16/2018       Preop Vitals  BP Readings from Last 3 Encounters:   03/27/19 132/82   03/22/19 140/71   03/22/19 113/74    Pulse Readings from Last 3 Encounters:   03/22/19 83   03/13/19 97   02/25/19 90      Resp Readings from Last 3 Encounters:   03/27/19 16   03/22/19 18   02/26/19 16    SpO2 Readings from Last 3 Encounters:   03/27/19 100%   03/22/19  "100%   03/22/19 100%      Temp Readings from Last 1 Encounters:   03/27/19 36.8  C (98.2  F) (Oral)    Ht Readings from Last 1 Encounters:   03/27/19 1.753 m (5' 9\")      Wt Readings from Last 1 Encounters:   03/27/19 72.6 kg (160 lb 0.9 oz)    Estimated body mass index is 23.64 kg/m  as calculated from the following:    Height as of this encounter: 1.753 m (5' 9\").    Weight as of this encounter: 72.6 kg (160 lb 0.9 oz).     LDA:  Peripheral IV 02/21/19 Right Hand (Active)   Site Assessment WDL 2/21/2019  9:00 AM   Line Status Saline locked 2/21/2019  7:33 AM   Phlebitis Scale 0-->no symptoms 2/21/2019  9:00 AM   Infiltration Scale 0 2/21/2019  9:00 AM   Extravasation? No 2/21/2019  9:00 AM   Dressing Intervention New dressing  2/21/2019  7:33 AM   Number of days: 34       Peripheral IV 03/27/19 Right Hand (Active)   Number of days: 0       CVC Double Lumen 04/17/18 Right Subclavian (Active)   Site Assessment WDL 3/27/2019  6:21 AM   Lumen Soln/Vol REFERENCE 2.1/2.1 12/15/2018 11:46 AM   External Cath Length (cm) 4 cm 12/13/2018 10:45 AM   Dressing Intervention Chlorhexidine sponge 3/27/2019  6:21 AM   Dressing Change Due 12/20/18 12/17/2018  2:00 PM   CVC Comment CDI 12/17/2018  2:00 PM   Lumen A - Color RED 12/17/2018  7:00 AM   Lumen B - Color BLUE 12/17/2018  7:00 AM   Extravasation? No 9/11/2018  4:45 PM   Number of days: 344       ETT (adult) (Active)   Number of days: 0       Ileostomy (Active)   Stomal Appliance Intact 3/27/2019  6:12 AM   Stoma Assessment [place] 3/27/2019  6:12 AM   Peristomal Assessment UTV 12/17/2018  8:00 AM   Stool Amount Small 12/17/2018  8:00 AM   Output (ml) 250 ml 12/16/2018 11:00 AM   Number of days: 106            Assessment:   ASA SCORE: 3    NPO Status: > 6 hours since completed [employer]   Documentation: H&P complete; Preop Testing complete; Consents complete   Proceeding: Proceed without further delay  Tobacco Use:  NO Active use of Tobacco/UNKNOWN Tobacco use status "     Plan:   Anes. Type:  General   Pre-Induction: Midazolam IV; Acetaminophen PO   Induction:  IV (Standard); Propofol; Rocuronium   Airway: Oral ETT   Access/Monitoring: PIV   Maintenance: Balanced   Emergence: Procedure Site   Logistics: Same Day Surgery     Postop Pain/Sedation Strategy:  Standard-Options: Opioids PRN     PONV Management:  Adult Risk Factors:, Non-Smoker, Postop Opioids  Prevention: Ondansetron; Dexamethasone     CONSENT: Direct conversation   Plan and risks discussed with: Patient   Blood Products: Consented (ALL Blood Products)                         Ritesh iBanchi MD

## 2019-03-27 NOTE — ANESTHESIA CARE TRANSFER NOTE
Patient: Murray Nicholson    Procedure(s):  Takedown Ileostomy    Diagnosis: Postoperative Status  Diagnosis Additional Information: No value filed.    Anesthesia Type:   No value filed.     Note:  Airway :Face Mask  Patient transferred to:PACU  Comments: Pt transferred to PACU.  VSS.  Report to RN.  All questions answered.  Care transferred. Handoff Report: Identifed the Patient, Identified the Reponsible Provider, Reviewed the pertinent medical history, Discussed the surgical course, Reviewed Intra-OP anesthesia mangement and issues during anesthesia, Set expectations for post-procedure period and Allowed opportunity for questions and acknowledgement of understanding      Vitals: (Last set prior to Anesthesia Care Transfer)    CRNA VITALS  3/27/2019 0846 - 3/27/2019 0923      3/27/2019             NIBP:  133/88    NIBP Mean:  110    Ht Rate:  86    SpO2:  100 %                Electronically Signed By: VERONICA Yee CRNA  March 27, 2019  9:23 AM

## 2019-03-27 NOTE — OR NURSING
Happened to draw a KCL @ ~ 1330.. Resulted in 7.0 Dr Decker informed et wrote orderes to shift.. 1  Amp d50 et 10 units of reg insulin given IV .. Will be rechecking KCL at 15:00

## 2019-03-28 ENCOUNTER — DOCUMENTATION ONLY (OUTPATIENT)
Dept: TRANSPLANT | Facility: CLINIC | Age: 64
End: 2019-03-28

## 2019-03-28 LAB
ANION GAP SERPL CALCULATED.3IONS-SCNC: 12 MMOL/L (ref 3–14)
BUN SERPL-MCNC: 42 MG/DL (ref 7–30)
CALCIUM SERPL-MCNC: 9.2 MG/DL (ref 8.5–10.1)
CHLORIDE SERPL-SCNC: 102 MMOL/L (ref 94–109)
CO2 SERPL-SCNC: 21 MMOL/L (ref 20–32)
CREAT SERPL-MCNC: 6.93 MG/DL (ref 0.66–1.25)
GFR SERPL CREATININE-BSD FRML MDRD: 8 ML/MIN/{1.73_M2}
GLUCOSE BLDC GLUCOMTR-MCNC: 137 MG/DL (ref 70–99)
GLUCOSE BLDC GLUCOMTR-MCNC: 139 MG/DL (ref 70–99)
GLUCOSE BLDC GLUCOMTR-MCNC: 153 MG/DL (ref 70–99)
GLUCOSE BLDC GLUCOMTR-MCNC: 154 MG/DL (ref 70–99)
GLUCOSE SERPL-MCNC: 121 MG/DL (ref 70–99)
MAGNESIUM SERPL-MCNC: 1.4 MG/DL (ref 1.6–2.3)
PHOSPHATE SERPL-MCNC: 3.8 MG/DL (ref 2.5–4.5)
PLATELET # BLD AUTO: 142 10E9/L (ref 150–450)
POTASSIUM SERPL-SCNC: 5.2 MMOL/L (ref 3.4–5.3)
SODIUM SERPL-SCNC: 135 MMOL/L (ref 133–144)

## 2019-03-28 PROCEDURE — A9270 NON-COVERED ITEM OR SERVICE: HCPCS | Mod: GY | Performed by: COLON & RECTAL SURGERY

## 2019-03-28 PROCEDURE — 84100 ASSAY OF PHOSPHORUS: CPT | Performed by: COLON & RECTAL SURGERY

## 2019-03-28 PROCEDURE — 25800030 ZZH RX IP 258 OP 636: Performed by: STUDENT IN AN ORGANIZED HEALTH CARE EDUCATION/TRAINING PROGRAM

## 2019-03-28 PROCEDURE — 40000141 ZZH STATISTIC PERIPHERAL IV START W/O US GUIDANCE

## 2019-03-28 PROCEDURE — 90937 HEMODIALYSIS REPEATED EVAL: CPT

## 2019-03-28 PROCEDURE — 36415 COLL VENOUS BLD VENIPUNCTURE: CPT | Performed by: COLON & RECTAL SURGERY

## 2019-03-28 PROCEDURE — 00000146 ZZHCL STATISTIC GLUCOSE BY METER IP

## 2019-03-28 PROCEDURE — A9270 NON-COVERED ITEM OR SERVICE: HCPCS | Mod: GY | Performed by: STUDENT IN AN ORGANIZED HEALTH CARE EDUCATION/TRAINING PROGRAM

## 2019-03-28 PROCEDURE — 12000001 ZZH R&B MED SURG/OB UMMC

## 2019-03-28 PROCEDURE — 80048 BASIC METABOLIC PNL TOTAL CA: CPT | Performed by: COLON & RECTAL SURGERY

## 2019-03-28 PROCEDURE — 25000132 ZZH RX MED GY IP 250 OP 250 PS 637: Mod: GY | Performed by: STUDENT IN AN ORGANIZED HEALTH CARE EDUCATION/TRAINING PROGRAM

## 2019-03-28 PROCEDURE — 25000132 ZZH RX MED GY IP 250 OP 250 PS 637: Mod: GY | Performed by: COLON & RECTAL SURGERY

## 2019-03-28 PROCEDURE — 25000128 H RX IP 250 OP 636: Performed by: COLON & RECTAL SURGERY

## 2019-03-28 PROCEDURE — 83735 ASSAY OF MAGNESIUM: CPT | Performed by: COLON & RECTAL SURGERY

## 2019-03-28 PROCEDURE — 85049 AUTOMATED PLATELET COUNT: CPT | Performed by: COLON & RECTAL SURGERY

## 2019-03-28 PROCEDURE — 25000131 ZZH RX MED GY IP 250 OP 636 PS 637: Mod: GY | Performed by: STUDENT IN AN ORGANIZED HEALTH CARE EDUCATION/TRAINING PROGRAM

## 2019-03-28 RX ORDER — HEPARIN SODIUM 1000 [USP'U]/ML
3 INJECTION, SOLUTION INTRAVENOUS; SUBCUTANEOUS
Status: DISCONTINUED | OUTPATIENT
Start: 2019-03-28 | End: 2019-04-01 | Stop reason: HOSPADM

## 2019-03-28 RX ORDER — HYDRALAZINE HYDROCHLORIDE 20 MG/ML
10 INJECTION INTRAMUSCULAR; INTRAVENOUS EVERY 4 HOURS PRN
Status: DISCONTINUED | OUTPATIENT
Start: 2019-03-28 | End: 2019-04-01 | Stop reason: HOSPADM

## 2019-03-28 RX ADMIN — TRAMADOL HYDROCHLORIDE 100 MG: 50 TABLET, COATED ORAL at 13:31

## 2019-03-28 RX ADMIN — LISINOPRIL 10 MG: 10 TABLET ORAL at 08:08

## 2019-03-28 RX ADMIN — SODIUM CHLORIDE, POTASSIUM CHLORIDE, SODIUM LACTATE AND CALCIUM CHLORIDE: 600; 310; 30; 20 INJECTION, SOLUTION INTRAVENOUS at 00:42

## 2019-03-28 RX ADMIN — Medication 1 CAPSULE: at 08:09

## 2019-03-28 RX ADMIN — CLONIDINE HYDROCHLORIDE 0.1 MG: 0.1 TABLET ORAL at 22:13

## 2019-03-28 RX ADMIN — ACETAMINOPHEN 1000 MG: 500 TABLET, FILM COATED ORAL at 13:12

## 2019-03-28 RX ADMIN — INSULIN ASPART 1 UNITS: 100 INJECTION, SOLUTION INTRAVENOUS; SUBCUTANEOUS at 17:09

## 2019-03-28 RX ADMIN — TACROLIMUS 2 MG: 1 CAPSULE ORAL at 18:00

## 2019-03-28 RX ADMIN — HEPARIN SODIUM 5000 UNITS: 5000 INJECTION, SOLUTION INTRAVENOUS; SUBCUTANEOUS at 08:10

## 2019-03-28 RX ADMIN — TRAMADOL HYDROCHLORIDE 100 MG: 50 TABLET, COATED ORAL at 04:20

## 2019-03-28 RX ADMIN — CIPROFLOXACIN 400 MG: 2 INJECTION, SOLUTION INTRAVENOUS at 00:39

## 2019-03-28 RX ADMIN — HYDRALAZINE HYDROCHLORIDE 100 MG: 100 TABLET ORAL at 13:12

## 2019-03-28 RX ADMIN — MYCOPHENOLATE MOFETIL 750 MG: 250 CAPSULE ORAL at 19:41

## 2019-03-28 RX ADMIN — TACROLIMUS 2 MG: 1 CAPSULE ORAL at 08:07

## 2019-03-28 RX ADMIN — HEPARIN SODIUM 5000 UNITS: 5000 INJECTION, SOLUTION INTRAVENOUS; SUBCUTANEOUS at 19:41

## 2019-03-28 RX ADMIN — ACETAMINOPHEN 1000 MG: 500 TABLET, FILM COATED ORAL at 08:08

## 2019-03-28 RX ADMIN — MAGNESIUM SULFATE HEPTAHYDRATE 4 G: 40 INJECTION, SOLUTION INTRAVENOUS at 19:41

## 2019-03-28 RX ADMIN — PANTOPRAZOLE SODIUM 40 MG: 40 TABLET, DELAYED RELEASE ORAL at 08:07

## 2019-03-28 RX ADMIN — METRONIDAZOLE 500 MG: 500 INJECTION, SOLUTION INTRAVENOUS at 04:21

## 2019-03-28 RX ADMIN — ACETAMINOPHEN 1000 MG: 500 TABLET, FILM COATED ORAL at 22:13

## 2019-03-28 RX ADMIN — PROCHLORPERAZINE EDISYLATE 10 MG: 5 INJECTION INTRAMUSCULAR; INTRAVENOUS at 13:02

## 2019-03-28 RX ADMIN — AMLODIPINE BESYLATE 10 MG: 10 TABLET ORAL at 08:07

## 2019-03-28 RX ADMIN — MYCOPHENOLATE MOFETIL 750 MG: 250 CAPSULE ORAL at 08:09

## 2019-03-28 RX ADMIN — ACETAMINOPHEN 1000 MG: 500 TABLET, FILM COATED ORAL at 15:58

## 2019-03-28 RX ADMIN — Medication 0.2 MG: at 00:39

## 2019-03-28 RX ADMIN — METRONIDAZOLE 500 MG: 500 INJECTION, SOLUTION INTRAVENOUS at 13:06

## 2019-03-28 RX ADMIN — PANTOPRAZOLE SODIUM 40 MG: 40 TABLET, DELAYED RELEASE ORAL at 15:58

## 2019-03-28 RX ADMIN — HYDRALAZINE HYDROCHLORIDE 100 MG: 100 TABLET ORAL at 18:00

## 2019-03-28 RX ADMIN — PROCHLORPERAZINE EDISYLATE 10 MG: 5 INJECTION INTRAMUSCULAR; INTRAVENOUS at 08:20

## 2019-03-28 ASSESSMENT — ACTIVITIES OF DAILY LIVING (ADL)
ADLS_ACUITY_SCORE: 15

## 2019-03-28 ASSESSMENT — PAIN DESCRIPTION - DESCRIPTORS
DESCRIPTORS: ACHING;SORE

## 2019-03-28 ASSESSMENT — MIFFLIN-ST. JEOR
SCORE: 1513.38
SCORE: 1513.85

## 2019-03-28 NOTE — PROGRESS NOTES
Colorectal Surgery Progress Note    Subjective:  NAEO. Reports he is doing well. Pain well controlled on current regiment.     Vitals:  Vitals:    03/28/19 0100 03/28/19 0420 03/28/19 0500 03/28/19 0610   BP:  123/69  131/79   BP Location:  Left arm  Left arm   Cuff Size:       Pulse:       Resp: 14 16 16 16   Temp:  96.8  F (36  C)  97.2  F (36.2  C)   TempSrc:  Axillary  Oral   SpO2:  99%  100%   Weight:       Height:         I/O:  I/O last 3 completed shifts:  In: 1237.83 [P.O.:420; I.V.:817.83]  Out: 5 [Blood:5]    Physical Exam:  Gen: Awake and alert, NAD  Pulm: Non-labored breathing  Abd: Soft, non-distended, appropriately tender, no guarding   Old ostomy site with serosanguinous drainage staining dressing, penrose drain in place  Ext:  Warm and well-perfused    BMP  Recent Labs   Lab 03/27/19  2203 03/27/19  1836 03/27/19  1550 03/27/19  1319 03/22/19  0816   NA  --   --   --   --  134   POTASSIUM 4.9 6.1* 6.2* 7.0* 5.3   CHLORIDE  --   --   --   --  102   CO2  --   --   --   --  23   BUN  --   --   --   --  42*   CR  --   --   --  9.20* 6.91*   GLC  --   --   --   --  114*   MAG  --   --   --   --  1.4*   PHOS  --   --   --   --  4.4     CBC  Recent Labs   Lab 03/22/19  0816   WBC 2.4*   HGB 13.3   HCT 44.2            ASSESSMENT: This is a 64 year old male w/ a h/o heart transplant in 2018, ESRD, DMII, GERD, and HTN who is POD1 s/p loop ilesotomy take down      Neuro/Pain: APAP and tramadol, dilaudid PRN  CV: Continue home antihypertensives  PULM: encourage IS.  GI/FEN: FLD, LR@ 50, dialysis dependent (T, Th, Sat), follow up BMP  : UOPA  Heme: No issues  ID: Continue home immunosuppressives for heart transplant  Endocrine: SSI  Activity: Encourage ambulation  Ppx: Lovenox  Dispo: Anticipate home in next 1-2 days      Noel Bolanos  PGY-1, General Surgery  [phone]    Patient was seen and discussed with Dr. Connors who will discuss with staff

## 2019-03-28 NOTE — PROGRESS NOTES
Kristi Armas PA Kao, Yeewan S, APRN CNS             Hi Sum. His K was 7.0 post op. We tPA'd the CVC and it worked just fine for emergent dialysis last night and is working fine again this AM. Will plan on heparin locking CVC. No need for exchange. Braulio Berry    Previous Messages      ----- Message -----   From: Torsten Levine APRN CNS   Sent: 3/26/2019   3:22 PM   To: MAYTE Garcia   Subject: CVC tunneled line dysfunction                     Jairo Berry,   Patient is scheduled for reversal of ileostomy tomorrow and will be as inpatient afterwards.   Dialysis center called and reported that CVC tunneled line dysfunction today, requested to exchange CVC tunneled line.   Would you please look into this when you see the patient?   Thanks   Sum

## 2019-03-28 NOTE — PROGRESS NOTES
HEMODIALYSIS TREATMENT NOTE    Date: 3/28/2019  Time: 9:10 AM    Data:  Pre Wt: 73.3kg    Desired Wt: 73.3 kg   Post Wt: 73.3kg   Weight gain: 0  kg   Weight change: 0  kg  Ultrafiltration - Post Run Net Total Removed (mL): 0 mL  Ultrafiltration - Post Run Net Total Gain (mL): 0 mL  Vascular Access Status: Yes, secured and visible  Dialyzer Rinse: Streaked, Light  Total Blood Volume Processed: 60.6  Total Dialysis (Treatment) Time:3hrs      Lab:   none    Interventions:Assessments  Pt dialyzed today for 3hrs on a K-2 Ca-2.5 bath via RCVC. 350 Qb throughout. No fluid off per renal team. VSS throughout. See MAR for meds given. See Epic for more details. HDCVC heparin locked post run. Report given to primary RN post run.     Plan:    Per renal

## 2019-03-28 NOTE — PROGRESS NOTES
Notified Resident at 1933 regarding lab results.      Spoke with: Dr. Garcia     Orders were not obtained.    Comments: pt getting dialysis right now     K+ 6.1

## 2019-03-28 NOTE — PROGRESS NOTES
POST OP CHECK     S: Patient up to 7C from dialysis. States he is having pain in his lower abdomen. Took 50 mg tramadol, helped a little, but states that he normally takes two tablets. Had some nausea in PACU, resolved with zofran. No chest pain or shortness of breath. On room air.     O:  Vitals:    03/27/19 2000 03/27/19 2015 03/27/19 2030 03/27/19 2100   BP: (!) 176/111 (!) 169/103 (!) 171/106 (!) 166/101   BP Location:       Cuff Size:       Pulse:       Resp: 18 18 16 18   Temp:    98  F (36.7  C)   TempSrc:    Oral   SpO2:       Weight:       Height:           I/O last 3 completed shifts:  In: 300 [I.V.:300]  Out: 5 [Blood:5]    AAOx3, no acute distress  Non labored breathing on room air  RRR, non-cyanotic  Abdomen is soft, mildly distended, appropriately tender to palpation without guarding or peritonitis  Extremities are warm and well perfused without edema, SCDs are on    A/P: Mr. 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 Overall doing well without any acute postoperative issues.     - Continue plan of cares per primary team  - Will increase tramadol to 100 mg q 6 hours and use a heating pack  - Tylenol, prn IV dilaudid  - LR @ 50 ml/hr  - Ordered a potassium recheck as pre-dialysis run was 6.1  - Full liquid diet  - Sliding scale insulin  - Subcutaneous heparin starts tomorrow morning    Jose Pierson MD (PGY-1)  General Surgery Resident  Surgery Cross Cover

## 2019-03-28 NOTE — PROGRESS NOTES
Nephrology Progress Note  03/28/2019         Assessment & Recommendations:   Murray Nicholson is a 64 year old male with OHT (6/2018), ESRD, HTN, perforated diverticulitis s/p sigmoidectomy with end-colostomy and small bowel resection for fistula in August 2018, admitted for planned ileostomy takedown (3/27), found to have potassium of 7.0 post op.      ESRD: 2/2 HTN and diabetes (heart failure worsened after ESRD dx so unlikely CRS as etiology of ESRD). Dialyzes TTS at [facility] under the care of Dr. Mcpherson. Run time: 3.5 hrs. EDW: 72.5 kg. Access: tunneled RIJ (plan is for LUE AVF)  - Emergently dialyzed last night, again today per TTS schedule  - CVC requires heparin locks      Hyperkalemia, resolved: K 7.0 post op 3/27, was shifted and emergently dialyzed. K of 5.2 this AM    BP: 130's; usual pre HD pressures 130-150's, post pressures 120-140's, lowest pressure ~ 100. PTA  amlodipine 10 mg qday, clonidine 0.1 mg q HS, hydralazine 100 mg tid, lisinopril 10 mg qday     Volume: EDW: 72.5 kg. Appears mildly hypervolemic. Minimal UOP. Usual UF 0.5 to 1 kg.    - Daily weights  - No UF today  - Recommend stopping IVF (elevated BP's, minimal UOP)     Anemia: hgb 13.3 g/dL; recent hgb 12-13's, ferr 258, iron 45, IS 15; ANASTASIYA stopped; venofer 50 mg qTuesday  - Continue venofer via HD     BMD: Ca 9.2, no alb, phos 3.8; recent . Not on Vit D or phos binder     S/p lap ileostomy takedown, on 3/27; management per surgery team      Recommendations were communicated to primary team via this note.      Kristi Armas PA-C  Division of Kidney Disease   [phone]    Interval History :   Seen on dialysis, stable run with no UF. CVC working fine after tPA done in PACU yesterday afternoon. Will plan on hep locking CVC. Post op pain somewhat well-controlled. Pt is having some n/v. Denies CP, SOB, chills.    Review of Systems:   4 point ROS neg other than as noted above    Physical Exam:   I/O last 3 completed  "shifts:  In: 1237.83 [P.O.:420; I.V.:817.83]  Out: 5 [Blood:5]   /79 (BP Location: Left arm)   Pulse 102   Temp 97.2  F (36.2  C) (Oral)   Resp 16   Ht 1.753 m (5' 9\")   Wt 73.3 kg (161 lb 9.6 oz)   SpO2 100%   BMI 23.86 kg/m       GENERAL APPEARANCE: alert, NAD  EYES:  no scleral icterus, pupils equal  HENT: mouth without ulcers or lesions  PULM: CTA  CV: regular rhythm, normal rate     -edema trace LE  GI: soft, s/p ileostomy takedown  INTEGUMENT: no cyanosis, no rash  NEURO:  no asterixis   Access tunneled [facility]    Labs:   All labs reviewed by me  Electrolytes/Renal -   Recent Labs   Lab Test 03/28/19  0705 03/27/19  2203 03/27/19  1836  03/27/19  1319 03/22/19  0816 02/19/19  0931 02/01/19  0817     --   --   --   --  134 132* 129*   POTASSIUM 5.2 4.9 6.1*   < > 7.0* 5.3 5.4* 4.5   CHLORIDE 102  --   --   --   --  102 102 96   CO2 21  --   --   --   --  23 20 26   BUN 42*  --   --   --   --  42* 45* 18   CR 6.93*  --   --   --  9.20* 6.91* 7.68* 5.85*   *  --   --   --   --  114* 184* 132*   TRINI 9.2  --   --   --   --  9.2 9.5 9.6   MAG 1.4*  --   --   --   --  1.4*  --  1.4*   PHOS 3.8  --   --   --   --  4.4  --  2.5    < > = values in this interval not displayed.       CBC -   Recent Labs   Lab Test 03/22/19  0816 02/19/19  0931 02/01/19  0817   WBC 2.4* 4.1 3.3*   HGB 13.3 11.5* 12.3*    164 190       LFTs -   Recent Labs   Lab Test 02/19/19  0931 12/28/18  0938 12/15/18  0907 12/07/18  0827   ALKPHOS 350* 203* 182* 190*   BILITOTAL 0.7 0.5 0.4 0.6   ALT 23 16 12 16   AST 27 18 12 15   PROTTOTAL 7.2  --  5.8* 6.4*   ALBUMIN 3.4  --  2.6* 2.9*       Iron Panel -   Recent Labs   Lab Test 07/10/18  0711 05/08/18  0954 07/19/17  1306   IRON 66 58 46   IRONSAT 28 27 18   ALBERTINA 771* 621* 369         Imaging:  Reviewed    Current Medications:    sodium chloride 0.9%  250 mL Intravenous Once in dialysis     sodium chloride 0.9%  300 mL Hemodialysis Machine Once     acetaminophen  1,000 " "mg Oral 4x Daily     amLODIPine  10 mg Oral Daily     cloNIDine  0.1 mg Oral At Bedtime     gelatin absorbable  1 each Topical During Hemodialysis (from stock)     heparin  5,000 Units Subcutaneous Q12H     hydrALAZINE  100 mg Oral TID     insulin aspart  1-7 Units Subcutaneous TID AC     insulin aspart  1-5 Units Subcutaneous At Bedtime     lisinopril  10 mg Oral Daily     metroNIDAZOLE  500 mg Intravenous Q8H     multivitamin RENAL  1 capsule Oral Daily     mycophenolate  750 mg Oral BID     - MEDICATION INSTRUCTIONS -   Does not apply Once     pantoprazole  40 mg Oral BID AC     sodium chloride (PF)  3 mL Intracatheter Q8H     sodium chloride (PF)  9 mL Intracatheter During Hemodialysis (from stock)     sodium chloride (PF)  9 mL Intracatheter During Hemodialysis (from stock)     tacrolimus  2 mg Oral BID IS       bupivacaine liposome (EXPAREL) LONG ACTING injection was administered into the infiltration site to produce postsurgical analgesia. Duration of action is up to 72 hours, and other \"judith\" medications should not be given for 96 hours with the exception of the lidocaine 5% patch (LIDODERM) and the lidocaine 10mg in potassium infusions. This entry is for INFORMATION ONLY.       lactated ringers 30 mL/hr at 03/28/19 0801     Kristi Armas PA-C  "

## 2019-03-28 NOTE — PROGRESS NOTES
RE: catheter  Received: Today       Kat Hamlin PA-C Kao, Yeewan S, APRN CNS                   Yes they have done that repeatedly without success. Reversal of lines was the last recourse.            Previous Messages       ----- Message -----      From: Torsten Levine APRN CNS      Sent: 3/21/2018   7:39 AM        To: Ana Luisa Mcpherson MD, *   Subject: RE: catheter                                     Kat,   Per IR protocol, TPA instilled into both lumens per CVC catheter volume and dwell for an hour at outpatient dialysis, is the first step to resolve CVC tunneled line dysfunction prior to sending patient to IR.   Please instruct outpatient dialysis center to instill TPA at next dialysis run.   Thanks   Sum     ----- Message -----      From: Kat Hamlin PA-C      Sent: 3/20/2018   5:09 PM        To: Ana Luisa Mcpherson MD, VERONICA Torrse   Subject: catheter                                         Dialysis center is reporting persistently poor arterial pressures,having to reverse lines at this point. We believe he needs a new line.  Are you able to help with this?     Kat                         CVC tunneled line revision with possible exchange is scheduled on 3/22/2018 with check in time at 12 pm.   18

## 2019-03-28 NOTE — PROGRESS NOTES
CLINICAL NUTRITION SERVICES  Reason for Assessment:  Nutrition education regarding low fiber/low residue diet education  Diet History:  Patient has no history of low fiber diet education.  Nutrition Diagnosis:  Food- and nutrition-related knowledge deficit r/t no previous knowledge of low fiber diet as evidenced by patient report  Interventions:  Provided instruction on low fiber diet. Discussed each food group and foods to eat and avoid. Answered patient's questions regarding diet.   Provided handouts : Fiber Restricted Nutrition Therapy and Low Fiber Diet Hospital Menu  Goals:   Patient will verbalize at least 5 low fiber foods acceptable on diet and 5 high fiber foods to avoid.  Follow-up:    Patient to ask any further nutrition-related questions before discharge.  In addition, pt may request outpatient RD appointment.  Armida Palafox, Dietetic Intern    I have read and agree with the above nutrition note  Kate Roberson RD, LD  7C RD pager: [phone]

## 2019-03-28 NOTE — PROGRESS NOTES
HEMODIALYSIS TREATMENT NOTE    Date: 3/27/2019  Time: 9:11 PM    Data:  Pre Wt:     Desired Wt:   kg   Post Wt:    Weight gain: 0  kg   Weight change:0   kg  Ultrafiltration - Post Run Net Total Removed (mL): 0 mL  Ultrafiltration - Post Run Net Total Gain (mL): 0 mL  Vascular Access Status: Yes, secured and visible  Dialyzer Rinse: Streaked, Light  Total Blood Volume Processed: 45.5  Total Dialysis (Treatment) Time:  2.5    Lab:   yes    Interventions:  2.5 hours of HD with no fluid pulled. K2 CA 2.5 bath. Pre K 6.1    Assessment:  ESRD patient in for surgery. Found to be hyperkalemic and needed short HD run     Plan:    HD in am

## 2019-03-28 NOTE — PLAN OF CARE
Mr Nicholson went to Dialysis via bed ~ 8:15 am -12:45 pm. VVS. PIV patent used for IV antibiotics and antiemetics. Small emesis ~ 8 am. Compazine given this morning and afternoon per pt request. He has been able to slowly take a full liquid diet and his medications po. Contact isolation. Pt wears the abd binder for comport. Po pain med given on return from dialysis

## 2019-03-28 NOTE — PLAN OF CARE
POD 1. BP max 160/97, 123/69 at recheck. HR slightly tachy. All other VSS. 0100 B. Pt reports abdominal pain, controlled with IV Dil x1 & Tramadol x1. Drsg (with penrose drain) some dried drainage. Tolerating FLD, BS faint, not yet passing gas. Pt up with SBA, no void d/t dialysis.

## 2019-03-28 NOTE — PLAN OF CARE
OT/7C: Cancel- Pt declined attempt, stating just getting back from walk, would like to rest due to increased pain.

## 2019-03-29 ENCOUNTER — APPOINTMENT (OUTPATIENT)
Dept: PHYSICAL THERAPY | Facility: CLINIC | Age: 64
DRG: 329 | End: 2019-03-29
Attending: COLON & RECTAL SURGERY
Payer: MEDICARE

## 2019-03-29 LAB
ANION GAP SERPL CALCULATED.3IONS-SCNC: 10 MMOL/L (ref 3–14)
BUN SERPL-MCNC: 25 MG/DL (ref 7–30)
CALCIUM SERPL-MCNC: 9.1 MG/DL (ref 8.5–10.1)
CHLORIDE SERPL-SCNC: 99 MMOL/L (ref 94–109)
CO2 SERPL-SCNC: 23 MMOL/L (ref 20–32)
CREAT SERPL-MCNC: 5.65 MG/DL (ref 0.66–1.25)
GFR SERPL CREATININE-BSD FRML MDRD: 10 ML/MIN/{1.73_M2}
GLUCOSE BLDC GLUCOMTR-MCNC: 114 MG/DL (ref 70–99)
GLUCOSE BLDC GLUCOMTR-MCNC: 119 MG/DL (ref 70–99)
GLUCOSE BLDC GLUCOMTR-MCNC: 126 MG/DL (ref 70–99)
GLUCOSE BLDC GLUCOMTR-MCNC: 137 MG/DL (ref 70–99)
GLUCOSE BLDC GLUCOMTR-MCNC: 181 MG/DL (ref 70–99)
GLUCOSE SERPL-MCNC: 101 MG/DL (ref 70–99)
MAGNESIUM SERPL-MCNC: 2.4 MG/DL (ref 1.6–2.3)
PHOSPHATE SERPL-MCNC: 3.8 MG/DL (ref 2.5–4.5)
POTASSIUM SERPL-SCNC: 4.4 MMOL/L (ref 3.4–5.3)
SODIUM SERPL-SCNC: 132 MMOL/L (ref 133–144)
TACROLIMUS BLD-MCNC: 9.3 UG/L (ref 5–15)
TME LAST DOSE: NORMAL H

## 2019-03-29 PROCEDURE — 00000146 ZZHCL STATISTIC GLUCOSE BY METER IP

## 2019-03-29 PROCEDURE — A9270 NON-COVERED ITEM OR SERVICE: HCPCS | Mod: GY | Performed by: COLON & RECTAL SURGERY

## 2019-03-29 PROCEDURE — 25000132 ZZH RX MED GY IP 250 OP 250 PS 637: Mod: GY | Performed by: STUDENT IN AN ORGANIZED HEALTH CARE EDUCATION/TRAINING PROGRAM

## 2019-03-29 PROCEDURE — A9270 NON-COVERED ITEM OR SERVICE: HCPCS | Mod: GY | Performed by: STUDENT IN AN ORGANIZED HEALTH CARE EDUCATION/TRAINING PROGRAM

## 2019-03-29 PROCEDURE — 36415 COLL VENOUS BLD VENIPUNCTURE: CPT | Performed by: COLON & RECTAL SURGERY

## 2019-03-29 PROCEDURE — 97530 THERAPEUTIC ACTIVITIES: CPT | Mod: GP | Performed by: PHYSICAL THERAPIST

## 2019-03-29 PROCEDURE — 25000128 H RX IP 250 OP 636: Performed by: COLON & RECTAL SURGERY

## 2019-03-29 PROCEDURE — 84100 ASSAY OF PHOSPHORUS: CPT | Performed by: COLON & RECTAL SURGERY

## 2019-03-29 PROCEDURE — 25000131 ZZH RX MED GY IP 250 OP 636 PS 637: Mod: GY | Performed by: COLON & RECTAL SURGERY

## 2019-03-29 PROCEDURE — 80197 ASSAY OF TACROLIMUS: CPT | Performed by: COLON & RECTAL SURGERY

## 2019-03-29 PROCEDURE — 97161 PT EVAL LOW COMPLEX 20 MIN: CPT | Mod: GP | Performed by: PHYSICAL THERAPIST

## 2019-03-29 PROCEDURE — 12000001 ZZH R&B MED SURG/OB UMMC

## 2019-03-29 PROCEDURE — 83735 ASSAY OF MAGNESIUM: CPT | Performed by: COLON & RECTAL SURGERY

## 2019-03-29 PROCEDURE — 25000131 ZZH RX MED GY IP 250 OP 636 PS 637: Mod: GY | Performed by: STUDENT IN AN ORGANIZED HEALTH CARE EDUCATION/TRAINING PROGRAM

## 2019-03-29 PROCEDURE — 25000132 ZZH RX MED GY IP 250 OP 250 PS 637: Mod: GY | Performed by: COLON & RECTAL SURGERY

## 2019-03-29 PROCEDURE — 99207 ZZC NO BILLABLE SERVICE THIS VISIT: CPT | Performed by: INTERNAL MEDICINE

## 2019-03-29 PROCEDURE — 80048 BASIC METABOLIC PNL TOTAL CA: CPT | Performed by: COLON & RECTAL SURGERY

## 2019-03-29 RX ORDER — TACROLIMUS 1 MG/1
1 CAPSULE ORAL
Status: DISCONTINUED | OUTPATIENT
Start: 2019-03-29 | End: 2019-03-31

## 2019-03-29 RX ORDER — TACROLIMUS 1 MG/1
2 CAPSULE ORAL
Status: DISCONTINUED | OUTPATIENT
Start: 2019-03-30 | End: 2019-03-31

## 2019-03-29 RX ADMIN — ACETAMINOPHEN 1000 MG: 500 TABLET, FILM COATED ORAL at 15:49

## 2019-03-29 RX ADMIN — PROCHLORPERAZINE EDISYLATE 10 MG: 5 INJECTION INTRAMUSCULAR; INTRAVENOUS at 18:54

## 2019-03-29 RX ADMIN — PANTOPRAZOLE SODIUM 40 MG: 40 TABLET, DELAYED RELEASE ORAL at 06:55

## 2019-03-29 RX ADMIN — ACETAMINOPHEN 1000 MG: 500 TABLET, FILM COATED ORAL at 20:47

## 2019-03-29 RX ADMIN — TRAMADOL HYDROCHLORIDE 100 MG: 50 TABLET, COATED ORAL at 00:32

## 2019-03-29 RX ADMIN — ACETAMINOPHEN 1000 MG: 500 TABLET, FILM COATED ORAL at 08:21

## 2019-03-29 RX ADMIN — MYCOPHENOLATE MOFETIL 750 MG: 250 CAPSULE ORAL at 20:46

## 2019-03-29 RX ADMIN — HEPARIN SODIUM 5000 UNITS: 5000 INJECTION, SOLUTION INTRAVENOUS; SUBCUTANEOUS at 20:48

## 2019-03-29 RX ADMIN — TRAMADOL HYDROCHLORIDE 100 MG: 50 TABLET, COATED ORAL at 10:08

## 2019-03-29 RX ADMIN — CLONIDINE HYDROCHLORIDE 0.1 MG: 0.1 TABLET ORAL at 22:35

## 2019-03-29 RX ADMIN — TRAMADOL HYDROCHLORIDE 100 MG: 50 TABLET, COATED ORAL at 22:35

## 2019-03-29 RX ADMIN — TACROLIMUS 2 MG: 1 CAPSULE ORAL at 08:21

## 2019-03-29 RX ADMIN — HEPARIN SODIUM 5000 UNITS: 5000 INJECTION, SOLUTION INTRAVENOUS; SUBCUTANEOUS at 08:21

## 2019-03-29 RX ADMIN — LISINOPRIL 10 MG: 10 TABLET ORAL at 08:21

## 2019-03-29 RX ADMIN — AMLODIPINE BESYLATE 10 MG: 10 TABLET ORAL at 08:21

## 2019-03-29 RX ADMIN — Medication 1 CAPSULE: at 08:21

## 2019-03-29 RX ADMIN — TACROLIMUS 1 MG: 1 CAPSULE ORAL at 20:47

## 2019-03-29 RX ADMIN — PROCHLORPERAZINE EDISYLATE 10 MG: 5 INJECTION INTRAMUSCULAR; INTRAVENOUS at 09:57

## 2019-03-29 RX ADMIN — MYCOPHENOLATE MOFETIL 750 MG: 250 CAPSULE ORAL at 08:21

## 2019-03-29 RX ADMIN — ACETAMINOPHEN 1000 MG: 500 TABLET, FILM COATED ORAL at 12:55

## 2019-03-29 RX ADMIN — HYDRALAZINE HYDROCHLORIDE 100 MG: 100 TABLET ORAL at 08:21

## 2019-03-29 RX ADMIN — HYDRALAZINE HYDROCHLORIDE 100 MG: 100 TABLET ORAL at 20:46

## 2019-03-29 RX ADMIN — HYDRALAZINE HYDROCHLORIDE 100 MG: 100 TABLET ORAL at 12:55

## 2019-03-29 RX ADMIN — PANTOPRAZOLE SODIUM 40 MG: 40 TABLET, DELAYED RELEASE ORAL at 15:49

## 2019-03-29 ASSESSMENT — PAIN DESCRIPTION - DESCRIPTORS
DESCRIPTORS: SHARP;SORE
DESCRIPTORS: SORE

## 2019-03-29 ASSESSMENT — ACTIVITIES OF DAILY LIVING (ADL)
ADLS_ACUITY_SCORE: 15

## 2019-03-29 NOTE — CONSULTS
Cardiology Consultation     Murray Nicholson MRN# [id number]       Date of Admission: 3/27/2019    Reason for Evaluation: Immunosuppression    Following along peripherally for immunosuppression following surgery.    Recommendations:  - Continue mycophenolate 750 mg twice daily  - Continue tacrolimus 1 mg qPM and 2 qAM; tacro goal 6-8 adjusted from 2 mg BID today.  Agree with trough level check tomorrow.  - Continue other PTA cardiac medications including lisinopril 10 mg daily, amlodipine 10 mg daily, clonidine 0.1 mg daily and hydralazine 100 mg TID.    Patient discussed with Dr. Mavis Shearer.    Jordan Urrutia MD  Cardiology        Pt's condition and care plan discussed with fellow but patient not seen personally by me today.    Mavis Shearer MD  Section Head - Advanced Heart Failure, Transplantation and Mechanical Circulatory Support  Director - [facility]  Associate Professor of Medicine, [facility]

## 2019-03-29 NOTE — CONSULTS
BRIEF SOCIAL WORK NOTE:    Social Work consulted for discharge planning. Per rounds this AM and PT/OT notes, home with assistance is recommended. RN[facility] will follow for community discharge planning. SW consult acknowledged and completed at this time. If additional psychosocial SW needs arise, please re-consult Social work.     BLESSING Benson, NIRAV  7C Surgical/Oncology Unit   Phone: [phone]  Pager: [phone]

## 2019-03-29 NOTE — PLAN OF CARE
POD2. VSS. Pt reports abdominal pain, controlled with Tramadol x1. 0100 B. Drsg (with penrose drain) small amount of dried drainage. FLD, fair intake, but pt denies nausea. BS hypoactive, not yet passing gas. Pt up with SBA, no void d/t dialysis.

## 2019-03-29 NOTE — PLAN OF CARE
Discharge Planner PT   Patient plan for discharge: home  Current status: PT eval completed. Pt instructed in post-op abdominal precautions (demos and verbalizes understanding of abdominal precautions from prior abdominal surgery last year). Pt limited by abdominal pain. Pt performs bed mobility w/ SBA, cues to avoid breath holding w/ supine <>sit. Pt is IND w/ STS transfers and 1 flight of stairs; SBA for ambulating ~200 ft x2 - pace slow d/t pain.  Barriers to return to prior living situation: none anticipated  Recommendations for discharge: home  Rationale for recommendations: anticipate pt will achieve level of functional IND prior to disch home.        Entered by: Kiana Walls 03/29/2019 12:15 PM

## 2019-03-29 NOTE — PLAN OF CARE
OT 7C. Defer. OT orders acknowledged and appreciated. Per conversation with PT, pt independent with ADL completion and has no acute OT needs. PT will continue to follow. OT will complete orders.

## 2019-03-29 NOTE — PROGRESS NOTES
03/29/19 1100   Quick Adds   Type of Visit Initial PT Evaluation   Living Environment   Lives With alone   Living Arrangements apartment   Home Accessibility stairs to enter home   Number of Stairs, Main Entrance (28 stairs (roughly 4+ 12 + 12 w/ landing))   Stair Railings, Main Entrance railings safe and in good condition   Living Environment Comment Pt has a tub shower and ~28 steps total to enter home. He reports that is broken up into ~4 steps, then ~12+12 steps to second floor of apartment. Pt reports driving.    Self-Care   Usual Activity Tolerance good   Current Activity Tolerance moderate   Regular Exercise Yes   Activity/Exercise Type (elliptical 15 minutes 3x/wk; standing LE exercises)   Equipment Currently Used at Home none   Functional Level Prior   Ambulation 0-->independent   Transferring 0-->independent   Toileting 0-->independent   Bathing 0-->independent   Communication 0-->understands/communicates without difficulty   Swallowing 0-->swallows foods/liquids without difficulty   Cognition 0 - no cognition issues reported   Fall history within last six months no   General Information   Onset of Illness/Injury or Date of Surgery - Date 03/27/19   Referring Physician Rick Tran MD   Patient/Family Goals Statement improved pain control   Pertinent History of Current Problem (include personal factors and/or comorbidities that impact the POC) 64 year old male w/ a h/o heart transplant in 2018, ESRD, DMII, GERD, and HTN who is s/p loop ilesotomy take down on 3/27/19.    Precautions/Limitations abdominal precautions  (heart transplant within past year- mask out of room )   Heart Disease Risk Factors Diabetes;Gender;Age;Medical history   General Observations male pt resting in bed, c/o abdominal pain; familiar w/ PT and abdominal precautions from prior hospitalizations   Cognitive Status Examination   Orientation orientation to person, place and time   Level of Consciousness alert   Follows  Commands and Answers Questions 100% of the time   Personal Safety and Judgment intact   Memory intact   Pain Assessment   Patient Currently in Pain Yes, see Vital Sign flowsheet  (abdominal pain)   Integumentary/Edema   Integumentary/Edema Comments impaired d/t abdominal surgery; jaundice   Posture    Posture Forward head position;Protracted shoulders   Range of Motion (ROM)   ROM Comment B UE/LE WFL   Strength   Strength Comments demos antigravity strength in all extremities - no MMT d/t abdominal precautions; reports weaker  strength B d/t carpal tunnel syndrome   Bed Mobility   Bed Mobility Comments SBA w/ HOB elevated 30 degrees and use of railing   Transfer Skills   Transfer Comments IND STS   Gait   Gait Comments pt ambulated w/out AD 50 ft w/ SBA; slower pace, limited by abdominal pain; able to ascend/descend a total of 12 stairs w/ 1 railing mod IND reciprocal pattern   Balance   Balance Comments SBA for dynamic balance and gait   Sensory Examination   Sensory Perception Comments pt reports occasional numbness/tingling in BUEs d/t carpal tunnel syndrome at baseline; otherwise no new sensory impairments   General Therapy Interventions   Planned Therapy Interventions bed mobility training;balance training;gait training;progressive activity/exercise   Clinical Impression   Criteria for Skilled Therapeutic Intervention yes, treatment indicated   PT Diagnosis impaired functional IND w/ bed mobility and gait   Influenced by the following impairments pain; abdominal precautions   Functional limitations due to impairments impaired IND w/ gait and bed mobility   Clinical Presentation Stable/Uncomplicated   Clinical Presentation Rationale clinical judgment   Clinical Decision Making (Complexity) Low complexity   Therapy Frequency` daily  (x1-2 visits)   Predicted Duration of Therapy Intervention (days/wks) 3/31/19   Anticipated Discharge Disposition Home   Risk & Benefits of therapy have been explained Yes  "  Patient, Family & other staff in agreement with plan of care Yes   Clinical Impression Comments Pt denies need for skilled OT at this time; IND took shower this AM, has tub shower at home, reports no concerns w/ ability to complete tub transfer or dressing/ADL tasks   [facility] AM-PAC  \"6 Clicks\" V.2 Basic Mobility Inpatient Short Form   1. Turning from your back to your side while in a flat bed without using bedrails? 4 - None   2. Moving from lying on your back to sitting on the side of a flat bed without using bedrails? 3 - A Little   3. Moving to and from a bed to a chair (including a wheelchair)? 4 - None   4. Standing up from a chair using your arms (e.g., wheelchair, or bedside chair)? 4 - None   5. To walk in hospital room? 3 - A Little   6. Climbing 3-5 steps with a railing? 4 - None   Basic Mobility Raw Score (Score out of 24.Lower scores equate to lower levels of function) 22   Total Evaluation Time   Total Evaluation Time (Minutes) 10     "

## 2019-03-29 NOTE — PROGRESS NOTES
Colorectal Surgery Progress Note    Subjective:  One small episode of emesis yesterday AM after his morning pills, nausea was relieved with compazine. Believes he may have passed some flatus.    Vitals:  Vitals:    03/28/19 1447 03/28/19 1800 03/28/19 2205 03/29/19 0130   BP: 118/73 132/77 141/79    BP Location: Left arm Left arm Left arm    Pulse: 90      Resp: 16  16 16   Temp: 96.5  F (35.8  C)  98.4  F (36.9  C)    TempSrc: Oral  Oral    SpO2: 98%  99%    Weight:       Height:         I/O:  I/O last 3 completed shifts:  In: 1839.33 [P.O.:1000; I.V.:839.33]  Out: 75 [Emesis/NG output:75]    Physical Exam:  Gen: Awake and alert, NAD  Pulm: Non-labored breathing  Abd: Soft, non-distended, appropriately tender, no guarding   Old ostomy site with serosanguinous drainage staining dressing, penrose drain in place  Ext:  Warm and well-perfused    BMP  Recent Labs   Lab 03/28/19  0705 03/27/19  2203 03/27/19  1836 03/27/19  1550 03/27/19  1319 03/22/19  0816     --   --   --   --  134   POTASSIUM 5.2 4.9 6.1* 6.2* 7.0* 5.3   CHLORIDE 102  --   --   --   --  102   CO2 21  --   --   --   --  23   BUN 42*  --   --   --   --  42*   CR 6.93*  --   --   --  9.20* 6.91*   *  --   --   --   --  114*   MAG 1.4*  --   --   --   --  1.4*   PHOS 3.8  --   --   --   --  4.4     CBC  Recent Labs   Lab 03/28/19  1347 03/22/19  0816   WBC  --  2.4*   HGB  --  13.3   HCT  --  44.2   * 165         ASSESSMENT: This is a 64 year old male w/ a h/o heart transplant in 2018, ESRD, DMII, GERD, and HTN who is POD#2 s/p loop ilesotomy take down      Neuro/Pain: APAP and tramadol, dilaudid PRN  CV: Continue home antihypertensives  PULM: encourage IS.  GI/FEN: Possibly advance diet in the afternoon to low residue, dialysis dependent (T, Th, Sat), follow up BMP  : UOPA  Heme: No issues  ID: Continue home immunosuppressives for heart transplant  Endocrine: SSI  Activity: Encourage ambulation  Ppx: Lovenox  Dispo: Anticipate home  in next 1-2 days      Noel Bolanos  PGY-1, General Surgery  [phone]    Patient was seen and discussed with Dr. Connors who will discuss with staff

## 2019-03-29 NOTE — PLAN OF CARE
"AVSS, up independently in room, SBA for longer walks. 100cc emesis after FL breakfast, compazine given with relief, Dr Tran aware.Pt advised to go very slowly with diet at this time.  Showered, tolerated well, dressing changed.  PIV SL, BGs ac/HS WNL.  Minimal use of Tramadol for pain.  Awaiting ROBF.      Addendum- pt had emesis as FL tray arrived in room at 1845.  Compazine given with relief.  Pt insisted on eating cream of wheat in spite of Dr Tran advising him this morning to avoid this food item today.  Says \"I need to eat something of substance\".  "

## 2019-03-29 NOTE — PLAN OF CARE
VSS. Pain managed with scheduled tylenol and PRN tramadol. Denies nausea this shift. Tolerating full liquid diet. TD site with dressing intact, small amt drainage. On HD, anuric. BG monitored AC/HS, covered per SS. Magnesium replacement infusing, recheck scheduled for 0130. Up with SBA. Pt resting comfortably. Continue POC.

## 2019-03-30 LAB
ANION GAP SERPL CALCULATED.3IONS-SCNC: 13 MMOL/L (ref 3–14)
BUN SERPL-MCNC: 36 MG/DL (ref 7–30)
CALCIUM SERPL-MCNC: 8.9 MG/DL (ref 8.5–10.1)
CHLORIDE SERPL-SCNC: 98 MMOL/L (ref 94–109)
CO2 SERPL-SCNC: 21 MMOL/L (ref 20–32)
CREAT SERPL-MCNC: 7.51 MG/DL (ref 0.66–1.25)
GFR SERPL CREATININE-BSD FRML MDRD: 7 ML/MIN/{1.73_M2}
GLUCOSE BLDC GLUCOMTR-MCNC: 114 MG/DL (ref 70–99)
GLUCOSE BLDC GLUCOMTR-MCNC: 122 MG/DL (ref 70–99)
GLUCOSE BLDC GLUCOMTR-MCNC: 131 MG/DL (ref 70–99)
GLUCOSE BLDC GLUCOMTR-MCNC: 195 MG/DL (ref 70–99)
GLUCOSE BLDC GLUCOMTR-MCNC: 89 MG/DL (ref 70–99)
GLUCOSE SERPL-MCNC: 92 MG/DL (ref 70–99)
MAGNESIUM SERPL-MCNC: 2.3 MG/DL (ref 1.6–2.3)
PHOSPHATE SERPL-MCNC: 4.8 MG/DL (ref 2.5–4.5)
POTASSIUM SERPL-SCNC: 4.6 MMOL/L (ref 3.4–5.3)
SODIUM SERPL-SCNC: 132 MMOL/L (ref 133–144)

## 2019-03-30 PROCEDURE — A9270 NON-COVERED ITEM OR SERVICE: HCPCS | Mod: GY | Performed by: STUDENT IN AN ORGANIZED HEALTH CARE EDUCATION/TRAINING PROGRAM

## 2019-03-30 PROCEDURE — 25000132 ZZH RX MED GY IP 250 OP 250 PS 637: Mod: GY | Performed by: COLON & RECTAL SURGERY

## 2019-03-30 PROCEDURE — 12000001 ZZH R&B MED SURG/OB UMMC

## 2019-03-30 PROCEDURE — 84100 ASSAY OF PHOSPHORUS: CPT | Performed by: COLON & RECTAL SURGERY

## 2019-03-30 PROCEDURE — 25000131 ZZH RX MED GY IP 250 OP 636 PS 637: Mod: GY | Performed by: COLON & RECTAL SURGERY

## 2019-03-30 PROCEDURE — 90937 HEMODIALYSIS REPEATED EVAL: CPT

## 2019-03-30 PROCEDURE — 25000128 H RX IP 250 OP 636: Performed by: COLON & RECTAL SURGERY

## 2019-03-30 PROCEDURE — 83735 ASSAY OF MAGNESIUM: CPT | Performed by: COLON & RECTAL SURGERY

## 2019-03-30 PROCEDURE — 80048 BASIC METABOLIC PNL TOTAL CA: CPT | Performed by: COLON & RECTAL SURGERY

## 2019-03-30 PROCEDURE — A9270 NON-COVERED ITEM OR SERVICE: HCPCS | Mod: GY | Performed by: COLON & RECTAL SURGERY

## 2019-03-30 PROCEDURE — 00000146 ZZHCL STATISTIC GLUCOSE BY METER IP

## 2019-03-30 PROCEDURE — 25000132 ZZH RX MED GY IP 250 OP 250 PS 637: Mod: GY | Performed by: STUDENT IN AN ORGANIZED HEALTH CARE EDUCATION/TRAINING PROGRAM

## 2019-03-30 PROCEDURE — 36415 COLL VENOUS BLD VENIPUNCTURE: CPT | Performed by: COLON & RECTAL SURGERY

## 2019-03-30 PROCEDURE — 25000131 ZZH RX MED GY IP 250 OP 636 PS 637: Mod: GY | Performed by: STUDENT IN AN ORGANIZED HEALTH CARE EDUCATION/TRAINING PROGRAM

## 2019-03-30 RX ORDER — TRAMADOL HYDROCHLORIDE 50 MG/1
100 TABLET ORAL EVERY 12 HOURS PRN
Status: DISCONTINUED | OUTPATIENT
Start: 2019-03-30 | End: 2019-04-01 | Stop reason: HOSPADM

## 2019-03-30 RX ADMIN — PROCHLORPERAZINE EDISYLATE 10 MG: 5 INJECTION INTRAMUSCULAR; INTRAVENOUS at 07:56

## 2019-03-30 RX ADMIN — ACETAMINOPHEN 1000 MG: 500 TABLET, FILM COATED ORAL at 20:05

## 2019-03-30 RX ADMIN — HYDRALAZINE HYDROCHLORIDE 100 MG: 100 TABLET ORAL at 12:44

## 2019-03-30 RX ADMIN — TACROLIMUS 1 MG: 1 CAPSULE ORAL at 19:10

## 2019-03-30 RX ADMIN — SODIUM CHLORIDE 250 ML: 9 INJECTION, SOLUTION INTRAVENOUS at 08:21

## 2019-03-30 RX ADMIN — SODIUM CHLORIDE 300 ML: 9 INJECTION, SOLUTION INTRAVENOUS at 08:21

## 2019-03-30 RX ADMIN — AMLODIPINE BESYLATE 10 MG: 10 TABLET ORAL at 12:42

## 2019-03-30 RX ADMIN — TRAMADOL HYDROCHLORIDE 100 MG: 50 TABLET, COATED ORAL at 07:55

## 2019-03-30 RX ADMIN — HEPARIN SODIUM 5000 UNITS: 5000 INJECTION, SOLUTION INTRAVENOUS; SUBCUTANEOUS at 20:06

## 2019-03-30 RX ADMIN — CLONIDINE HYDROCHLORIDE 0.1 MG: 0.1 TABLET ORAL at 22:09

## 2019-03-30 RX ADMIN — ACETAMINOPHEN 1000 MG: 500 TABLET, FILM COATED ORAL at 16:39

## 2019-03-30 RX ADMIN — HEPARIN SODIUM 5000 UNITS: 5000 INJECTION, SOLUTION INTRAVENOUS; SUBCUTANEOUS at 12:48

## 2019-03-30 RX ADMIN — MYCOPHENOLATE MOFETIL 750 MG: 250 CAPSULE ORAL at 19:14

## 2019-03-30 RX ADMIN — Medication: at 08:21

## 2019-03-30 RX ADMIN — PANTOPRAZOLE SODIUM 40 MG: 40 TABLET, DELAYED RELEASE ORAL at 16:39

## 2019-03-30 RX ADMIN — HYDRALAZINE HYDROCHLORIDE 100 MG: 100 TABLET ORAL at 19:09

## 2019-03-30 RX ADMIN — ALTEPLASE 2 MG: 2.2 INJECTION, POWDER, LYOPHILIZED, FOR SOLUTION INTRAVENOUS at 12:02

## 2019-03-30 RX ADMIN — TRAMADOL HYDROCHLORIDE 100 MG: 50 TABLET, COATED ORAL at 20:13

## 2019-03-30 RX ADMIN — LISINOPRIL 10 MG: 10 TABLET ORAL at 12:39

## 2019-03-30 RX ADMIN — PANTOPRAZOLE SODIUM 40 MG: 40 TABLET, DELAYED RELEASE ORAL at 12:42

## 2019-03-30 RX ADMIN — Medication 1 CAPSULE: at 12:38

## 2019-03-30 RX ADMIN — TACROLIMUS 2 MG: 1 CAPSULE ORAL at 08:51

## 2019-03-30 RX ADMIN — ACETAMINOPHEN 1000 MG: 500 TABLET, FILM COATED ORAL at 12:40

## 2019-03-30 RX ADMIN — MYCOPHENOLATE MOFETIL 750 MG: 250 CAPSULE ORAL at 08:51

## 2019-03-30 ASSESSMENT — MIFFLIN-ST. JEOR
SCORE: 1510.38
SCORE: 1530.18

## 2019-03-30 ASSESSMENT — ACTIVITIES OF DAILY LIVING (ADL)
ADLS_ACUITY_SCORE: 15

## 2019-03-30 ASSESSMENT — PAIN DESCRIPTION - DESCRIPTORS
DESCRIPTORS: SHARP;SORE
DESCRIPTORS: DISCOMFORT

## 2019-03-30 NOTE — PROGRESS NOTES
HEMODIALYSIS TREATMENT NOTE    Date: 3/30/2019  Time: 12:11 PM    Data:  Pre Wt: 75  Desired Wt: 72.5 kg   Post Wt:  73   Weight gain: 2000  kg   Weight change:0  kg  Ultrafiltration - Post Run Net Total Removed (mL): 2000 mL  Ultrafiltration - Post Run Net Total Gain (mL): 0 mL  Vascular Access Status: Yes, secured and visible  Dialyzer Rinse: Streaked, Heavy  Total Blood Volume Processed: 69.5  Total Dialysis (Treatment) Time: 3.5    Lab:   No    Interventions/Assessment:pt does not appear fluid overloaded. Pt unable to tolerated goal of 2.5 L due to dips in blood pressure. Pt responded well to lower goal and was able to get 2L off. Hd catheter TPA locked due to low blood flows near the end and visible clotting. Handoff given to floor RN.       Plan:    Will continue to monitor and follow POC according to renal team.

## 2019-03-30 NOTE — PLAN OF CARE
"/73 (BP Location: Left arm, Cuff Size: Adult Regular)   Pulse 94   Temp 97.9  F (36.6  C) (Oral)   Resp 16   Ht 1.753 m (5' 9\")   Wt 73.3 kg (161 lb 9.6 oz)   SpO2 100%   BMI 23.86 kg/m    Patient afebrile, VSS. Patients abdominal pain controlled with scheduled Tylenol and tramadol X 1.Patient tolerated full liquid diet of cream of wheat with no c/o nausea or vomiting. Patients HS BG@181, no coverage per Sliding scale. Patients abdominal incision dressing with drainage, reinforcement dressing applied. Patient denies passing gas, anuric . Patient appears to rest between cares. Cont with POC.  "

## 2019-03-30 NOTE — PROGRESS NOTES
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      ESRD: 2/2 HTN and diabetes (heart failure worsened after ESRD dx so unlikely CRS as etiology of ESRD). [facility]ialyzes [facility] under the care of Dr. Mcpherson. Run time: 3.5 hrs. EDW: 72.5 kg. Access: tunneled RIJ (plan is for LUE AVF)  - had hyperkalemia, thus emergently dialyzed  - CVC requires heparin locks    - he is near dry weight, will challenge down slightly    Hyperkalemia, resolved: K 7.0 post op 3/27, was shifted and emergently dialyzed.      BP: 130's; usual pre HD pressures 130-150's, post pressures 120-140's, lowest pressure ~ 100. PTA  amlodipine 10 mg qday, clonidine 0.1 mg q HS, hydralazine 100 mg tid, lisinopril 10 mg qday     Volume: EDW: 72.5 kg. Appears mildly hypervolemic. Minimal UOP. Usual UF 0.5 to 1 kg.    - Daily weights     Anemia: hgb 13.3 g/dL; recent hgb 12-13's, ferr 258, iron 45, IS 15; ANASTASIYA stopped; venofer 50 mg qTuesday  - Continue venofer via HD     BMD: Ca 9.2, no alb, phos 3.8; recent . Not on Vit D or phos binder     S/p lap ileostomy takedown, on 3/27; management per surgery team        Recommendations were communicated to primary team via this note.           Radha Americo Arcos MD     Interval History :   In the last 24 hours Murray Nicholson is doing well, post ileostomy take down. He is on liquid diet, no BM yet so no solids. On dialysis for volume control    Review of Systems:   A 4 point review of systems was negative except as noted above.    Physical Exam:   I/O last 3 completed shifts:  In: 399 [P.O.:390; I.V.:9]  Out: 100 [Emesis/NG output:100]    GENERAL APPEARANCE: alert and no distress  EYES:  no  scleral icterus, pupils equal  HENT: mouth without ulcers or lesions  PULM: lungs clear to auscultation, equal air movement, no cyanosis or clubbing  CV: regular rhythm, normal rate, no rub     -JVP no     -edema trace   GI: soft, minimal tender, mildly distended, bowel sounds are  MS: no evidence of inflammation in joints, no muscle tenderness  NEURO: mentation intact and speech normal, no asterixis   Lines no  Access CVC    Labs:   All labs reviewed by me  Electrolytes/Renal -   Recent Labs   Lab Test 03/30/19  0736 03/29/19  0801 03/28/19  0705   * 132* 135   POTASSIUM 4.6 4.4 5.2   CHLORIDE 98 99 102   CO2 21 23 21   BUN 36* 25 42*   CR 7.51* 5.65* 6.93*   GLC 92 101* 121*   TRINI 8.9 9.1 9.2   MAG 2.3 2.4* 1.4*   PHOS 4.8* 3.8 3.8       CBC -   Recent Labs   Lab Test 03/28/19  1347 03/22/19  0816 02/19/19  0931 02/01/19  0817   WBC  --  2.4* 4.1 3.3*   HGB  --  13.3 11.5* 12.3*   * 165 164 190       LFTs -   Recent Labs   Lab Test 02/19/19  0931 12/28/18  0938 12/15/18  0907 12/07/18  0827   ALKPHOS 350* 203* 182* 190*   BILITOTAL 0.7 0.5 0.4 0.6   ALT 23 16 12 16   AST 27 18 12 15   PROTTOTAL 7.2  --  5.8* 6.4*   ALBUMIN 3.4  --  2.6* 2.9*       Iron Panel -   Recent Labs   Lab Test 07/10/18  0711 05/08/18  0954 07/19/17  1306   IRON 66 58 46   IRONSAT 28 27 18   ALBERTINA 771* 621* 369         Imaging:  All imaging studies reviewed by me.     Current Medications:    acetaminophen  1,000 mg Oral 4x Daily     amLODIPine  10 mg Oral Daily     cloNIDine  0.1 mg Oral At Bedtime     heparin Lock (1000 units/mL High concentration)  3 mL Intracatheter Once in dialysis     heparin Lock (1000 units/mL High concentration)  3 mL Intracatheter Once in dialysis     heparin  5,000 Units Subcutaneous Q12H     hydrALAZINE  100 mg Oral TID     insulin aspart  1-7 Units Subcutaneous TID AC     insulin aspart  1-5 Units Subcutaneous At Bedtime     lisinopril  10 mg Oral Daily     multivitamin RENAL  1 capsule Oral Daily      "mycophenolate  750 mg Oral BID     pantoprazole  40 mg Oral BID AC     sodium chloride (PF)  3 mL Intracatheter Q8H     tacrolimus  1 mg Oral QPM    And     tacrolimus  2 mg Oral QAM       bupivacaine liposome (EXPAREL) LONG ACTING injection was administered into the infiltration site to produce postsurgical analgesia. Duration of action is up to 72 hours, and other \"judith\" medications should not be given for 96 hours with the exception of the lidocaine 5% patch (LIDODERM) and the lidocaine 10mg in potassium infusions. This entry is for INFORMATION ONLY.       Radha Americo Arcos MD  "

## 2019-03-30 NOTE — PLAN OF CARE
POD #3. VSS on RA. Up independently in room; SBA for longer walks. PIV saline locked. PRN Tramadol and scheduled Tylenol for pain. Full liquid diet; denies nausea. Denies passing gas; no BM on shift. On hemodialysis; scheduled for 0745 this AM. No voids due to dialysis. Contact isolation precautions. Abdominal reinforcement dressing C/D/I. Continue POC.

## 2019-03-30 NOTE — PROGRESS NOTES
Colorectal Surgery Progress Note    Subjective:  NAEO. No BM or flatus overnight. C/o some nasusea yesterday evening but has resolved.    Vitals:  Vitals:    03/29/19 1411 03/29/19 2050 03/29/19 2237 03/30/19 0607   BP: 146/84 157/57 130/73 138/73   BP Location: Right arm Left arm Left arm Left arm   Cuff Size:   Adult Regular    Pulse:   94    Resp: 16  16 16   Temp: 97  F (36.1  C)  97.9  F (36.6  C) 97.7  F (36.5  C)   TempSrc: Oral  Oral Oral   SpO2: 100%   100%   Weight:    75 kg (165 lb 4.8 oz)   Height:         I/O:  I/O last 3 completed shifts:  In: 399 [P.O.:390; I.V.:9]  Out: 100 [Emesis/NG output:100]    Physical Exam:  Gen: Awake and alert, NAD  Pulm: Non-labored breathing  Abd: Soft, non-distended, appropriately tender, no guarding   Old ostomy site with serosanguinous drainage staining dressing, penrose drain in place  Ext:  Warm and well-perfused    BMP  Recent Labs   Lab 03/30/19  0736 03/29/19  0801 03/28/19  0705 03/27/19  2203  03/27/19  1319   * 132* 135  --   --   --    POTASSIUM 4.6 4.4 5.2 4.9   < > 7.0*   CHLORIDE 98 99 102  --   --   --    CO2 PENDING 23 21  --   --   --    BUN PENDING 25 42*  --   --   --    CR PENDING 5.65* 6.93*  --   --  9.20*   GLC PENDING 101* 121*  --   --   --    MAG  --  2.4* 1.4*  --   --   --    PHOS  --  3.8 3.8  --   --   --     < > = values in this interval not displayed.     CBC  Recent Labs   Lab 03/28/19  1347   *         ASSESSMENT: 64 year old male w/ a h/o heart transplant in 2018, ESRD, DMII, GERD, and HTN who is POD#3 s/p loop ilesotomy take down. Episode of emesis yesterday, will advance diet slowly.      Neuro/Pain: APAP and tramadol, dilaudid PRN  CV: Continue home antihypertensives  PULM: encourage IS.  GI/FEN: Continue FLD, dialysis dependent (T, Th, Sat)  : UOPA  Heme: No issues  ID: Continue home immunosuppressives for heart transplant  Endocrine: SSI  Activity: Encourage ambulation  Ppx: Lovenox  Dispo: Anticipate home in next 1-2  mirna Bolanos  PGY-1, General Surgery  [phone]    Patient was seen and discussed with Dr. Sood who will discuss with staff    Attestation:  This patient has been seen and evaluated by me, aNresh Arora.  Discussed with the house staff team or resident(s) and agree with the findings and plan in this note.      Naresh Arora MD  Colon and Rectal Surgery Attending  [phone]

## 2019-03-30 NOTE — PLAN OF CARE
Alert & Orientated. Contact precautions in place. Vitals stable. Blood glucose checks before meals.  Up ad edith. Kidney Dialysis done this morning pt arrived back on 7C at 1240 pm. Pain managed with prn tramadol and scheduled tylenol.  Nausea managed with Compazine administered x1. Tolerating FLD. Abdominal dressing C/D/I. Continue with plan of care.

## 2019-03-31 ENCOUNTER — APPOINTMENT (OUTPATIENT)
Dept: PHYSICAL THERAPY | Facility: CLINIC | Age: 64
DRG: 329 | End: 2019-03-31
Attending: COLON & RECTAL SURGERY
Payer: MEDICARE

## 2019-03-31 LAB
ANION GAP SERPL CALCULATED.3IONS-SCNC: 11 MMOL/L (ref 3–14)
BUN SERPL-MCNC: 30 MG/DL (ref 7–30)
CALCIUM SERPL-MCNC: 9.1 MG/DL (ref 8.5–10.1)
CHLORIDE SERPL-SCNC: 97 MMOL/L (ref 94–109)
CO2 SERPL-SCNC: 22 MMOL/L (ref 20–32)
CREAT SERPL-MCNC: 5.91 MG/DL (ref 0.66–1.25)
GFR SERPL CREATININE-BSD FRML MDRD: 9 ML/MIN/{1.73_M2}
GLUCOSE BLDC GLUCOMTR-MCNC: 100 MG/DL (ref 70–99)
GLUCOSE BLDC GLUCOMTR-MCNC: 161 MG/DL (ref 70–99)
GLUCOSE BLDC GLUCOMTR-MCNC: 218 MG/DL (ref 70–99)
GLUCOSE BLDC GLUCOMTR-MCNC: 255 MG/DL (ref 70–99)
GLUCOSE SERPL-MCNC: 103 MG/DL (ref 70–99)
MAGNESIUM SERPL-MCNC: 1.9 MG/DL (ref 1.6–2.3)
PHOSPHATE SERPL-MCNC: 4 MG/DL (ref 2.5–4.5)
POTASSIUM SERPL-SCNC: 4.3 MMOL/L (ref 3.4–5.3)
SODIUM SERPL-SCNC: 130 MMOL/L (ref 133–144)
TACROLIMUS BLD-MCNC: 10.5 UG/L (ref 5–15)
TME LAST DOSE: NORMAL H

## 2019-03-31 PROCEDURE — 80048 BASIC METABOLIC PNL TOTAL CA: CPT | Performed by: COLON & RECTAL SURGERY

## 2019-03-31 PROCEDURE — 80197 ASSAY OF TACROLIMUS: CPT | Performed by: STUDENT IN AN ORGANIZED HEALTH CARE EDUCATION/TRAINING PROGRAM

## 2019-03-31 PROCEDURE — 97530 THERAPEUTIC ACTIVITIES: CPT | Mod: GP

## 2019-03-31 PROCEDURE — 25000128 H RX IP 250 OP 636: Performed by: COLON & RECTAL SURGERY

## 2019-03-31 PROCEDURE — 97116 GAIT TRAINING THERAPY: CPT | Mod: GP

## 2019-03-31 PROCEDURE — 99232 SBSQ HOSP IP/OBS MODERATE 35: CPT | Mod: GC | Performed by: INTERNAL MEDICINE

## 2019-03-31 PROCEDURE — 12000001 ZZH R&B MED SURG/OB UMMC

## 2019-03-31 PROCEDURE — 25000131 ZZH RX MED GY IP 250 OP 636 PS 637: Mod: GY | Performed by: STUDENT IN AN ORGANIZED HEALTH CARE EDUCATION/TRAINING PROGRAM

## 2019-03-31 PROCEDURE — 25000131 ZZH RX MED GY IP 250 OP 636 PS 637: Mod: GY | Performed by: COLON & RECTAL SURGERY

## 2019-03-31 PROCEDURE — 36415 COLL VENOUS BLD VENIPUNCTURE: CPT | Performed by: COLON & RECTAL SURGERY

## 2019-03-31 PROCEDURE — 00000146 ZZHCL STATISTIC GLUCOSE BY METER IP

## 2019-03-31 PROCEDURE — A9270 NON-COVERED ITEM OR SERVICE: HCPCS | Mod: GY | Performed by: COLON & RECTAL SURGERY

## 2019-03-31 PROCEDURE — 25000132 ZZH RX MED GY IP 250 OP 250 PS 637: Mod: GY | Performed by: COLON & RECTAL SURGERY

## 2019-03-31 PROCEDURE — 84100 ASSAY OF PHOSPHORUS: CPT | Performed by: COLON & RECTAL SURGERY

## 2019-03-31 PROCEDURE — 83735 ASSAY OF MAGNESIUM: CPT | Performed by: COLON & RECTAL SURGERY

## 2019-03-31 RX ORDER — TACROLIMUS 1 MG/1
1 CAPSULE ORAL
Status: DISCONTINUED | OUTPATIENT
Start: 2019-03-31 | End: 2019-03-31

## 2019-03-31 RX ORDER — TACROLIMUS 1 MG/1
1 CAPSULE ORAL
Status: DISCONTINUED | OUTPATIENT
Start: 2019-03-31 | End: 2019-04-01 | Stop reason: HOSPADM

## 2019-03-31 RX ORDER — TACROLIMUS 1 MG/1
1 CAPSULE ORAL
Status: DISCONTINUED | OUTPATIENT
Start: 2019-04-01 | End: 2019-03-31

## 2019-03-31 RX ADMIN — HYDRALAZINE HYDROCHLORIDE 100 MG: 100 TABLET ORAL at 13:32

## 2019-03-31 RX ADMIN — PANTOPRAZOLE SODIUM 40 MG: 40 TABLET, DELAYED RELEASE ORAL at 17:53

## 2019-03-31 RX ADMIN — ACETAMINOPHEN 1000 MG: 500 TABLET, FILM COATED ORAL at 08:37

## 2019-03-31 RX ADMIN — LISINOPRIL 10 MG: 10 TABLET ORAL at 08:36

## 2019-03-31 RX ADMIN — MYCOPHENOLATE MOFETIL 750 MG: 250 CAPSULE ORAL at 08:36

## 2019-03-31 RX ADMIN — ACETAMINOPHEN 1000 MG: 500 TABLET, FILM COATED ORAL at 21:22

## 2019-03-31 RX ADMIN — TRAMADOL HYDROCHLORIDE 100 MG: 50 TABLET, COATED ORAL at 21:22

## 2019-03-31 RX ADMIN — ACETAMINOPHEN 1000 MG: 500 TABLET, FILM COATED ORAL at 13:32

## 2019-03-31 RX ADMIN — HEPARIN SODIUM 5000 UNITS: 5000 INJECTION, SOLUTION INTRAVENOUS; SUBCUTANEOUS at 19:33

## 2019-03-31 RX ADMIN — AMLODIPINE BESYLATE 10 MG: 10 TABLET ORAL at 08:36

## 2019-03-31 RX ADMIN — MYCOPHENOLATE MOFETIL 750 MG: 250 CAPSULE ORAL at 19:33

## 2019-03-31 RX ADMIN — PANTOPRAZOLE SODIUM 40 MG: 40 TABLET, DELAYED RELEASE ORAL at 08:37

## 2019-03-31 RX ADMIN — Medication 1 CAPSULE: at 08:36

## 2019-03-31 RX ADMIN — INSULIN ASPART 2 UNITS: 100 INJECTION, SOLUTION INTRAVENOUS; SUBCUTANEOUS at 18:40

## 2019-03-31 RX ADMIN — HYDRALAZINE HYDROCHLORIDE 100 MG: 100 TABLET ORAL at 08:37

## 2019-03-31 RX ADMIN — CLONIDINE HYDROCHLORIDE 0.1 MG: 0.1 TABLET ORAL at 22:27

## 2019-03-31 RX ADMIN — ACETAMINOPHEN 1000 MG: 500 TABLET, FILM COATED ORAL at 17:53

## 2019-03-31 RX ADMIN — HEPARIN SODIUM 5000 UNITS: 5000 INJECTION, SOLUTION INTRAVENOUS; SUBCUTANEOUS at 08:37

## 2019-03-31 RX ADMIN — TACROLIMUS 1 MG: 1 CAPSULE ORAL at 17:53

## 2019-03-31 RX ADMIN — INSULIN ASPART 1 UNITS: 100 INJECTION, SOLUTION INTRAVENOUS; SUBCUTANEOUS at 13:32

## 2019-03-31 RX ADMIN — TACROLIMUS 2 MG: 1 CAPSULE ORAL at 08:36

## 2019-03-31 RX ADMIN — HYDRALAZINE HYDROCHLORIDE 100 MG: 100 TABLET ORAL at 17:53

## 2019-03-31 ASSESSMENT — PAIN DESCRIPTION - DESCRIPTORS
DESCRIPTORS: DISCOMFORT;SORE
DESCRIPTORS: DISCOMFORT;SORE
DESCRIPTORS: DISCOMFORT

## 2019-03-31 ASSESSMENT — ACTIVITIES OF DAILY LIVING (ADL)
ADLS_ACUITY_SCORE: 15
ADLS_ACUITY_SCORE: 13

## 2019-03-31 NOTE — PLAN OF CARE
Writer cared for pt from [phone]. AVSS. Pt c/o abdominal discomfort, taking scheduled tylenol, declined any other analgesic. Pt denies nausea, tolerating FL diet, was recently advanced to LF diet. Takedown site dressing CDI. Pt up ad edith, independent. Ambulated hallways 3x. Pt anuric, dialysis dependent. Passing flatus, no BM. Continue POC.

## 2019-03-31 NOTE — PLAN OF CARE
"POD 3 Loop ileostomy takedown   Pain: Pt pain managed with scheduled tylenol and PRN tramadol   Nausea: denies   Lab: No coverage needed for BS   /71 (BP Location: Left arm)   Pulse 90   Temp 98  F (36.7  C) (Oral)   Resp 16   Ht 1.753 m (5' 9\")   Wt 73 kg (160 lb 15 oz)   SpO2 100%   BMI 23.77 kg/m     Output: Anuric   Diet: Full liquid and carb counts   Activity: UAL   Bowel Function: Bowel sounds hypoactive in all quadrants, passing flatus, no bm this shift   Line(s): R CVC, locked, dressing CDI. L PIV, saline locked, dressing CDI   Drain(s): L penrose drain, dressing changed per orders   Plan: Continue to monitor pain, BS, VS, output, and bowel function   "

## 2019-03-31 NOTE — PLAN OF CARE
Discharge Planner PT  7C  Patient plan for discharge: home   Current status: Progress gait without use of assistive device for >800 ft - pt ambulating IND with no balance concerns. Decreased gait speed d/t increased pain. 3 x 4 stairs with use of 1 railing IND with step through pattern for ascent/descent - no concerns. IND bed mobility though continues to have increased pain with positional changes. STS without UE support. IP PT goals. PT to complete orders.    Encourage continuation of ambulating in hallways while IP.  Nursing: IND    Barriers to return to prior living situation: medical status  Recommendations for discharge: home  Rationale for recommendations: Pt demonstrates that he is safe to disch home. No further therapy needed.        Entered by: Gena Salomon 03/31/2019 10:18 AM
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
## 2019-03-31 NOTE — PROGRESS NOTES
Colorectal Surgery Progress Note    Subjective:  NAEO. Reported sharp abdominal pain that was relieved as he passed flatus. Tolerating FLD, no nausea or vomiting.    Vitals:  Vitals:    03/30/19 1446 03/30/19 2207 03/30/19 2208 03/31/19 0616   BP: 99/62 118/71 124/71 134/79   BP Location: Left arm Left arm Left arm Left arm   Cuff Size:  Adult Regular  Adult Regular   Pulse:   90 96   Resp: 16      Temp: 98.7  F (37.1  C)  98  F (36.7  C) 97.8  F (36.6  C)   TempSrc: Oral  Oral Oral   SpO2: 100%  100% 100%   Weight:       Height:         I/O:  I/O last 3 completed shifts:  In: 300 [P.O.:300]  Out: 2000 [Other:2000]    Physical Exam:  Gen: Awake and alert, NAD  Pulm: Non-labored breathing  Abd: Soft, non-distended, appropriately tender, no guarding   Old ostomy site with serosanguinous drainage staining dressing, penrose drain in place  Ext:  Warm and well-perfused    BMP  Recent Labs   Lab 03/31/19  0641 03/30/19  0736 03/29/19  0801 03/28/19  0705   * 132* 132* 135   POTASSIUM 4.3 4.6 4.4 5.2   CHLORIDE 97 98 99 102   CO2 22 21 23 21   BUN 30 36* 25 42*   CR 5.91* 7.51* 5.65* 6.93*   * 92 101* 121*   MAG 1.9 2.3 2.4* 1.4*   PHOS 4.0 4.8* 3.8 3.8     CBC  Recent Labs   Lab 03/28/19  1347   *         ASSESSMENT: 64 year old male w/ a h/o heart transplant in 2018, ESRD, DMII, GERD, and HTN who is POD#4 s/p loop ilesotomy take down. Flatus overnight, plan to slowly advance diet.      Neuro/Pain: APAP and tramadol, dilaudid PRN  CV: Continue home antihypertensives  PULM: encourage IS.  GI/FEN: Advance to low fiber diet, dialysis dependent (T, Th, Sat)  : Dialysis yesterday  Heme: No issues  ID: Continue home immunosuppressives for heart transplant  Endocrine: SSI  Activity: Encourage ambulation  Ppx: Lovenox  Dispo: Anticipate home in next 1-2 days      Noel Bolanos  PGY-1, General Surgery  [phone]    Patient was seen and discussed with Dr. Sood who will discuss with staff

## 2019-04-01 VITALS
WEIGHT: 160.94 LBS | HEART RATE: 94 BPM | SYSTOLIC BLOOD PRESSURE: 151 MMHG | RESPIRATION RATE: 18 BRPM | HEIGHT: 69 IN | TEMPERATURE: 97.4 F | BODY MASS INDEX: 23.84 KG/M2 | OXYGEN SATURATION: 100 % | DIASTOLIC BLOOD PRESSURE: 86 MMHG

## 2019-04-01 LAB
ANION GAP SERPL CALCULATED.3IONS-SCNC: 13 MMOL/L (ref 3–14)
BUN SERPL-MCNC: 44 MG/DL (ref 7–30)
CALCIUM SERPL-MCNC: 9 MG/DL (ref 8.5–10.1)
CHLORIDE SERPL-SCNC: 96 MMOL/L (ref 94–109)
CO2 SERPL-SCNC: 22 MMOL/L (ref 20–32)
COPATH REPORT: NORMAL
CREAT SERPL-MCNC: 7.67 MG/DL (ref 0.66–1.25)
ERYTHROCYTE [DISTWIDTH] IN BLOOD BY AUTOMATED COUNT: 14.2 % (ref 10–15)
GFR SERPL CREATININE-BSD FRML MDRD: 7 ML/MIN/{1.73_M2}
GLUCOSE BLDC GLUCOMTR-MCNC: 176 MG/DL (ref 70–99)
GLUCOSE SERPL-MCNC: 120 MG/DL (ref 70–99)
HCT VFR BLD AUTO: 39.7 % (ref 40–53)
HGB BLD-MCNC: 12.7 G/DL (ref 13.3–17.7)
MAGNESIUM SERPL-MCNC: 1.8 MG/DL (ref 1.6–2.3)
MCH RBC QN AUTO: 29.8 PG (ref 26.5–33)
MCHC RBC AUTO-ENTMCNC: 32 G/DL (ref 31.5–36.5)
MCV RBC AUTO: 93 FL (ref 78–100)
PHOSPHATE SERPL-MCNC: 4.6 MG/DL (ref 2.5–4.5)
PLATELET # BLD AUTO: 173 10E9/L (ref 150–450)
POTASSIUM SERPL-SCNC: 4.5 MMOL/L (ref 3.4–5.3)
RBC # BLD AUTO: 4.26 10E12/L (ref 4.4–5.9)
SODIUM SERPL-SCNC: 131 MMOL/L (ref 133–144)
WBC # BLD AUTO: 4.2 10E9/L (ref 4–11)

## 2019-04-01 PROCEDURE — 83735 ASSAY OF MAGNESIUM: CPT | Performed by: COLON & RECTAL SURGERY

## 2019-04-01 PROCEDURE — 36415 COLL VENOUS BLD VENIPUNCTURE: CPT | Performed by: COLON & RECTAL SURGERY

## 2019-04-01 PROCEDURE — 25000131 ZZH RX MED GY IP 250 OP 636 PS 637: Mod: GY | Performed by: STUDENT IN AN ORGANIZED HEALTH CARE EDUCATION/TRAINING PROGRAM

## 2019-04-01 PROCEDURE — 84100 ASSAY OF PHOSPHORUS: CPT | Performed by: COLON & RECTAL SURGERY

## 2019-04-01 PROCEDURE — 36415 COLL VENOUS BLD VENIPUNCTURE: CPT | Performed by: STUDENT IN AN ORGANIZED HEALTH CARE EDUCATION/TRAINING PROGRAM

## 2019-04-01 PROCEDURE — 85027 COMPLETE CBC AUTOMATED: CPT | Performed by: STUDENT IN AN ORGANIZED HEALTH CARE EDUCATION/TRAINING PROGRAM

## 2019-04-01 PROCEDURE — 25000128 H RX IP 250 OP 636: Performed by: COLON & RECTAL SURGERY

## 2019-04-01 PROCEDURE — 25000132 ZZH RX MED GY IP 250 OP 250 PS 637: Mod: GY | Performed by: COLON & RECTAL SURGERY

## 2019-04-01 PROCEDURE — 00000146 ZZHCL STATISTIC GLUCOSE BY METER IP

## 2019-04-01 PROCEDURE — A9270 NON-COVERED ITEM OR SERVICE: HCPCS | Mod: GY | Performed by: COLON & RECTAL SURGERY

## 2019-04-01 PROCEDURE — 80048 BASIC METABOLIC PNL TOTAL CA: CPT | Performed by: COLON & RECTAL SURGERY

## 2019-04-01 PROCEDURE — 25000131 ZZH RX MED GY IP 250 OP 636 PS 637: Mod: GY | Performed by: COLON & RECTAL SURGERY

## 2019-04-01 RX ORDER — ACETAMINOPHEN 500 MG
1000 TABLET ORAL 4 TIMES DAILY
Qty: 60 TABLET | Refills: 1 | Status: ON HOLD | OUTPATIENT
Start: 2019-04-01 | End: 2019-12-22

## 2019-04-01 RX ORDER — TRAMADOL HYDROCHLORIDE 50 MG/1
100 TABLET ORAL EVERY 12 HOURS PRN
Qty: 28 TABLET | Refills: 0 | Status: SHIPPED | OUTPATIENT
Start: 2019-04-01 | End: 2019-07-08

## 2019-04-01 RX ADMIN — MYCOPHENOLATE MOFETIL 750 MG: 250 CAPSULE ORAL at 07:57

## 2019-04-01 RX ADMIN — HEPARIN SODIUM 5000 UNITS: 5000 INJECTION, SOLUTION INTRAVENOUS; SUBCUTANEOUS at 07:57

## 2019-04-01 RX ADMIN — AMLODIPINE BESYLATE 10 MG: 10 TABLET ORAL at 07:58

## 2019-04-01 RX ADMIN — PANTOPRAZOLE SODIUM 40 MG: 40 TABLET, DELAYED RELEASE ORAL at 07:58

## 2019-04-01 RX ADMIN — Medication 1 CAPSULE: at 07:58

## 2019-04-01 RX ADMIN — ACETAMINOPHEN 1000 MG: 500 TABLET, FILM COATED ORAL at 07:58

## 2019-04-01 RX ADMIN — TACROLIMUS 1 MG: 1 CAPSULE ORAL at 07:58

## 2019-04-01 RX ADMIN — HYDRALAZINE HYDROCHLORIDE 100 MG: 100 TABLET ORAL at 07:58

## 2019-04-01 RX ADMIN — LISINOPRIL 10 MG: 10 TABLET ORAL at 07:58

## 2019-04-01 ASSESSMENT — ACTIVITIES OF DAILY LIVING (ADL)
ADLS_ACUITY_SCORE: 13
RETIRED_EATING: 0-->INDEPENDENT
ADLS_ACUITY_SCORE: 13
ADLS_ACUITY_SCORE: 13
DRESS: 0-->INDEPENDENT

## 2019-04-01 ASSESSMENT — PAIN DESCRIPTION - DESCRIPTORS: DESCRIPTORS: SORE

## 2019-04-01 NOTE — DISCHARGE SUMMARY
Discharge Summary    Murray Nicholson MRN# [id number]   YOB: 1955 Age: 64 year old     Date of Admission:  3/27/2019  Date of Discharge::  4/01/2019  Admitting Physician:  Rick Tran MD  Discharge Physician:  Rick Tran MD  Primary Care Physician:         Yahir Turcios          Admission Diagnoses:   Ileostomy status (H)         Discharge Diagnosis:   S/p ileostomy takedown         Procedures:   3/27/19 Dr. Tran: Loop ileostomy takedown with side to side small bowel anastomsis        Non-operative procedures:   None performed          Consultations:   NEPHROLOGY GENERAL ADULT IP CONSULT  NUTRITION SERVICES ADULT IP CONSULT  OCCUPATIONAL THERAPY ADULT IP CONSULT  PHYSICAL THERAPY ADULT IP CONSULT  SOCIAL WORK IP CONSULT  VASCULAR ACCESS CARE ADULT IP CONSULT         Imaging Studies:   None performed in hospital         Medications Prior to Admission:     Medications Prior to Admission   Medication Sig Dispense Refill Last Dose     albuterol (PROAIR HFA/PROVENTIL HFA/VENTOLIN HFA) 108 (90 Base) MCG/ACT inhaler Inhale 2 puffs into the lungs every 4 hours as needed for shortness of breath / dyspnea or wheezing   Past Month at Unknown time     amLODIPine (NORVASC) 10 MG tablet Take 1 tablet (10 mg) by mouth daily 90 tablet 3 3/27/2019 at 0430     aspirin 81 MG EC tablet Take 81 mg by mouth every evening    Past Week at Unknown time     atorvastatin (LIPITOR) 40 MG tablet Take 1 tablet (40 mg) by mouth daily 90 tablet 3 3/26/2019 at 1200     biotin (BIOTIN 5000) 5 MG CAPS Take 5 mg by mouth daily 30 capsule 3 Past Week at Unknown time     cloNIDine (CATAPRES) 0.1 MG tablet Take 1 tablet (0.1 mg) by mouth At Bedtime 90 tablet 3 3/26/2019 at 2000     hydrALAZINE (APRESOLINE) 100 MG TABS tablet Take 1 tablet (100 mg) by mouth every 8 hours 90 tablet 11 3/26/2019 at 0700     insulin aspart (NOVOLOG PEN) 100 UNIT/ML injection Inject 1-7 Units Subcutaneous 4 times daily (before meals  "and nightly) (Patient taking differently: Inject 1-7 Units Subcutaneous 4 times daily (before meals and nightly) Sliding scale) 9 mL 3 3/26/2019 at Unknown time     lisinopril (PRINIVIL/ZESTRIL) 10 MG tablet Take 1 tablet (10 mg) by mouth daily 90 tablet 3 3/26/2019 at 0700     melatonin 1 MG TABS tablet Take 2 tablets (2 mg) by mouth nightly as needed for sleep 120 tablet 1 3/26/2019 at 2000     mycophenolate (GENERIC EQUIVALENT) 250 MG capsule Take 3 capsules (750 mg) by mouth 2 times daily 180 capsule 11 3/27/2019 at 0430     NEPHROCAPS 1 MG capsule Take 1 capsule by mouth daily 120 capsule 0 Past Week at Unknown time     pantoprazole (PROTONIX) 40 MG EC tablet Take 1 tablet (40 mg) by mouth 2 times daily (before meals) 60 tablet 11 3/27/2019 at 0430     tacrolimus (GENERIC EQUIVALENT) 1 MG capsule Take two capsules (2 mg) twice a day 120 capsule 11 3/27/2019 at 0430     blood glucose monitoring (ACCU-CHEK FASTCLIX) lancets Use to test blood sugar 2-3 times daily or as directed.  Ok to substitute alternative if insurance prefers. 102 each 11 Taking     blood glucose monitoring (NO BRAND SPECIFIED) test strip Use to test blood sugar 2-3 times daily or as directed. 100 each 11 Taking     fluticasone (FLONASE) 50 MCG/ACT spray Spray 1-2 sprays into both nostrils daily (Patient taking differently: Spray 1-2 sprays into both nostrils daily as needed ) 16 g 11 More than a month at Unknown time     loratadine (CLARITIN) 10 MG tablet Take 10 mg by mouth daily as needed Reported on 5/3/2017   More than a month at Unknown time     [] metroNIDAZOLE (FLAGYL) 500 MG tablet Take 1 tablet (500 mg) by mouth every 8 hours for 1 day prior to surgery.  Take in conjunction with Neomycin Sulfate. 3 tablet 0 Taking     order for DME Coloplast       1 piece convex light Uriel cut up to 1  \" with filter #[id number]       Or  Shashi         1 piece soft convex 2 \" #25475        Accessories   2\" barrier ring #[id number]                    "        Adapt paste #28257                           Adapt powder #7906                          No sting film barrier # 3042                          Brava elastic barrier strips curved # [id number]    Treatment Diagnosis: New ileostomy 30 each 11 Taking     traMADol (ULTRAM) 50 MG tablet Take 50 mg by mouth every 6 hours as needed for severe pain   More than a month at Unknown time          Discharge Medications:     Current Discharge Medication List      CONTINUE these medications which have NOT CHANGED    Details   albuterol (PROAIR HFA/PROVENTIL HFA/VENTOLIN HFA) 108 (90 Base) MCG/ACT inhaler Inhale 2 puffs into the lungs every 4 hours as needed for shortness of breath / dyspnea or wheezing      amLODIPine (NORVASC) 10 MG tablet Take 1 tablet (10 mg) by mouth daily  Qty: 90 tablet, Refills: 3    Associated Diagnoses: Hypertension goal BP (blood pressure) < 130/80      aspirin 81 MG EC tablet Take 81 mg by mouth every evening       atorvastatin (LIPITOR) 40 MG tablet Take 1 tablet (40 mg) by mouth daily  Qty: 90 tablet, Refills: 3    Comments: 90 day supply if able.  Associated Diagnoses: Heart replaced by transplant (H)      biotin (BIOTIN 5000) 5 MG CAPS Take 5 mg by mouth daily  Qty: 30 capsule, Refills: 3    Associated Diagnoses: Brittle nails      cloNIDine (CATAPRES) 0.1 MG tablet Take 1 tablet (0.1 mg) by mouth At Bedtime  Qty: 90 tablet, Refills: 3    Comments: 90 day supply if able.  Associated Diagnoses: Hypertension; Heart replaced by transplant (H)      hydrALAZINE (APRESOLINE) 100 MG TABS tablet Take 1 tablet (100 mg) by mouth every 8 hours  Qty: 90 tablet, Refills: 11    Associated Diagnoses: Essential hypertension      insulin aspart (NOVOLOG PEN) 100 UNIT/ML injection Inject 1-7 Units Subcutaneous 4 times daily (before meals and nightly)  Qty: 9 mL, Refills: 3    Associated Diagnoses: Type 2 diabetes mellitus with diabetic nephropathy, with long-term current use of insulin (H)      lisinopril  (PRINIVIL/ZESTRIL) 10 MG tablet Take 1 tablet (10 mg) by mouth daily  Qty: 90 tablet, Refills: 3    Associated Diagnoses: Hypertension goal BP (blood pressure) < 130/80      melatonin 1 MG TABS tablet Take 2 tablets (2 mg) by mouth nightly as needed for sleep  Qty: 120 tablet, Refills: 1    Associated Diagnoses: Heart transplanted (H)      mycophenolate (GENERIC EQUIVALENT) 250 MG capsule Take 3 capsules (750 mg) by mouth 2 times daily  Qty: 180 capsule, Refills: 11    Associated Diagnoses: Heart transplanted (H)      NEPHROCAPS 1 MG capsule Take 1 capsule by mouth daily  Qty: 120 capsule, Refills: 0    Associated Diagnoses: End stage renal disease (H)      pantoprazole (PROTONIX) 40 MG EC tablet Take 1 tablet (40 mg) by mouth 2 times daily (before meals)  Qty: 60 tablet, Refills: 11    Associated Diagnoses: Duodenitis      tacrolimus (GENERIC EQUIVALENT) 1 MG capsule Take two capsules (2 mg) twice a day  Qty: 120 capsule, Refills: 11    Associated Diagnoses: Heart transplant recipient (H)      blood glucose monitoring (ACCU-CHEK FASTCLIX) lancets Use to test blood sugar 2-3 times daily or as directed.  Ok to substitute alternative if insurance prefers.  Qty: 102 each, Refills: 11    Associated Diagnoses: Type 2 diabetes mellitus with stage 5 chronic kidney disease not on chronic dialysis, without long-term current use of insulin (H)      blood glucose monitoring (NO BRAND SPECIFIED) test strip Use to test blood sugar 2-3 times daily or as directed.  Qty: 100 each, Refills: 11    Associated Diagnoses: Type 2 diabetes mellitus with stage 5 chronic kidney disease not on chronic dialysis, without long-term current use of insulin (H)      fluticasone (FLONASE) 50 MCG/ACT spray Spray 1-2 sprays into both nostrils daily  Qty: 16 g, Refills: 11    Associated Diagnoses: Nasal congestion      loratadine (CLARITIN) 10 MG tablet Take 10 mg by mouth daily as needed Reported on 5/3/2017    Associated Diagnoses: Hypertensive  "cardiopathy; SOB (shortness of breath)      order for DME Coloplast       1 piece convex light Uriel cut up to 1  \" with filter #[id number]       Or  Shashi         1 piece soft convex 2 1/8\" #40482        Accessories   2\" barrier ring #[id number]                           Adapt paste #98883                           Adapt powder #7987                          No sting film barrier # 4458                          Brava elastic barrier strips curved # [id number]    Treatment Diagnosis: New ileostomy  Qty: 30 each, Refills: 11    Associated Diagnoses: S/P ileostomy (H)      traMADol (ULTRAM) 50 MG tablet Take 50 mg by mouth every 6 hours as needed for severe pain         STOP taking these medications       metroNIDAZOLE (FLAGYL) 500 MG tablet Comments:   Reason for Stopping:                  Medications Discontinued or Adjusted During This Hospitalization:   No change         Antibiotics Prescribed at Discharge:   None          Brief History of Illness:   Murray Nicholson is a 64 year old M with a history of heart transplant done 6/14/18 (complicated by small bowel perforation requiring resection) and ESRD on dialysis who is status post sigmoidectomy with end colostomy for diverticulitis in August 2018 followed by colostomy takedown with diverting loop ileostomy on 12/11/18. He was seen in clinic 2/04 and at that time had been doing well so ileostomy takedown was scheduled with Dr. Tran for 3/27.           Hospital Course:   The patient underwent the above procedure and was noted to have a K of 7.0 post-op. He was shifted in the PACU with dextrose/insulin and underwent emergent dialysis later that afternoon. Repeat K following dialysis was 4.9 and has remained within normal limits on his normal dialysis schedule of T-T-S. Cardiology was consulted and followed the patient throughout his stay to manage his immunosuppression. The patient had return of bowel function on 4/1 and has tolerated advancement of his diet without issue. He " has been able to ambulate and void without difficulty and has had consistent urine output. His pain has been adequately controlled and he was transitioned to oral medications prior to discharge. The patient was deemed ready for discharge to home on 4/1 with directions to follow up per below.         Final Pathology Result:   None         Discharge Instructions and Follow-Up:   Discharge diet: Low residue   Discharge activity: No lifting > 10 lb for 6 weeks, then advance as tolerated   Discharge follow-up: Follow up in colorectal clinic with NP in 1 week   Tubes/Lines/Drains: None   Wound care: Ok to let water run over wound, do not immerse in water. No specific dressing requirements          Home Health Care:   Not needed           Discharge Disposition:   Discharged to home      Condition at discharge: Stable    Lacho Sosa MD  PGY3    D/w Dr. Tran

## 2019-04-01 NOTE — PLAN OF CARE
"POD 4 Loop ileostomy takedown   Pain: Declined interventions for pain at this time   Nausea: denies   Lab: No coverage needed for BS   /77 (BP Location: Left arm)   Pulse 99   Temp 99  F (37.2  C) (Oral)   Resp 18   Ht 1.753 m (5' 9\")   Wt 73 kg (160 lb 15 oz)   SpO2 100%   BMI 23.77 kg/m     Output: Anuric   Diet: LFD  Activity: UAL   Bowel Function: Bowel sounds heard in all quadrants, passing flatus, no bm this shift   Line(s): R CVC, locked, dressing CDI. L PIV, saline locked, dressing CDI   Drain(s): L penrose drain, dressing w/ dried drainage   Plan: Continue to monitor pain, BS, VS, output, and bowel function     "

## 2019-04-01 NOTE — PROGRESS NOTES
Transition Planning Update    D:  Per MD update, Mr. Nicholson will discharge today. No home care needs but, inpatient Nephrology notes and RN Dialysis Treatment notes were faxed [facility] - [phone] and fax: [phone].   A/P: Patient is stable for discharge today and will follow up in clinic as designated in discharge orders.    Denisha Morelos, B.S.N., P.H.N.,R.N.         Pager

## 2019-04-01 NOTE — PLAN OF CARE
[id number] Abdominal dressing CDI. +bowel sounds, passing flatus. No BM. Denies nausea. Abdominal pain managed with Tylenol and Tramadol. BG ac and hs, 2 units insulin given at bedtime for . On low fiber diet, tolerated about 25% of dinner and 100% boost shake. On HD Tu/Th/Sat, anuric this shift. Up ad edith. VSS on room air.

## 2019-04-01 NOTE — PLAN OF CARE
Pt discharge to home. Discharge instructions discussed with pt and paperwork signed. Medications picked up at discharge pharmacy. PIV removed. All belongings with pt.

## 2019-04-02 ENCOUNTER — TELEPHONE (OUTPATIENT)
Dept: TRANSPLANT | Facility: CLINIC | Age: 64
End: 2019-04-02

## 2019-04-02 DIAGNOSIS — Z94.1 HEART REPLACED BY TRANSPLANT (H): Primary | ICD-10-CM

## 2019-04-02 NOTE — TELEPHONE ENCOUNTER
Called pt to check in after dc'd following ostomy reversal. Pt reported doing well, but still feeling some pain. Pt scheduled for colonoscopy 4/8/2019. Pt reported discussing procedure with PCP's nurse today. Pt concerned about pain control during colonoscopy.  Pt will check FK, BMP and CBC on 4/10/2019.

## 2019-04-02 NOTE — DISCHARGE SUMMARY
Dialysis Discharge Summary Brief    [facility]  Division of Nephrology  Nephrology Discharge Dialysis Orders  Ph: [phone]  Fax: [phone]    Murray Nicholson  MRN: [id number]  YOB: 1955    [facility] Dialysis Unit: [place]  Primary Nephrologist: Dr. Mcpherson/Cleo Yu NP    Date of Admission: 3/27/2019  Date of Discharge: 4/1/2019    Last dialyzed Saturday 3/30. Recommend heparin locking CVC post HD. Please page me at  with any questions. Thanks much. Kristi Armas PA-C    Murray Nicholson is a 64 year old male with OHT (6/2018), ESRD, HTN, perforated diverticulitis s/p sigmoidectomy with end-colostomy and small bowel resection for fistula in August 2018, admitted for planned ileostomy takedown (3/27),  found to have potassium of 7.0 post op.      ESRD: 2/2 HTN and diabetes (heart failure worsened after ESRD dx so unlikely CRS as etiology of ESRD). Dialyzes TTS at [facility] under the care of Dr. Mcpherson. Run time: 3.5 hrs. EDW: 72.5 kg. Access: tunneled RIJ (plan is for LUE AVF)  - CVC requires heparin locks        Hyperkalemia, resolved: K 7.0 post op 3/27, was shifted and emergently dialyzed.      BP: 130's; usual pre HD pressures 130-150's, post pressures 120-140's, lowest pressure ~ 100. PTA  amlodipine 10 mg qday, clonidine 0.1 mg q HS, hydralazine 100 mg tid, lisinopril 10 mg qday     Volume: EDW: 72.5 kg. Appears mildly hypervolemic. Minimal UOP. Usual UF 0.5 to 1 kg.    - Daily weights     Anemia: hgb 13.3 g/dL; recent hgb 12-13's, ferr 258, iron 45, IS 15; ANASTASIYA stopped; venofer 50 mg qTuesday  - Continue venofer via HD     BMD: Ca 9.2, no alb, phos 3.8; recent . Not on Vit D or phos binder     S/p lap ileostomy takedown, on 3/27; management per surgery team          [x Resume all previous dialysis orders with exception as noted below    New Orders (if not applicable put NA):  Estimated Dry Weight No change   Dialysis  Duration    Dialysis Access    Antibiotics (dose per dialysis, end date)            Labs to be drawn at dialysis    Other major changes to dialysis prescription (e.g. Dialysate bath, heparin, blood flow rate, etc)   Recommend heparin locking CVC after dialysis runs   Medication changes (also fax the unit a copy of the discharge summary)              Name of physician completing this form: Kristi Armas PA-C

## 2019-04-03 ENCOUNTER — PATIENT OUTREACH (OUTPATIENT)
Dept: SURGERY | Facility: CLINIC | Age: 64
End: 2019-04-03

## 2019-04-03 ENCOUNTER — TELEPHONE (OUTPATIENT)
Dept: TRANSPLANT | Facility: CLINIC | Age: 64
End: 2019-04-03

## 2019-04-03 NOTE — PROGRESS NOTES
Patient called with questions in regard to his upcoming scan. Patient wants to verify there will be no rectal contrast as he could not tolerate his gastrografin enema. Patient was advised this is just a MRI of his abdomen and this will not include any rectal contrast.Patient stated understanding of these instructions and is in agreement with his current plan of care.

## 2019-04-03 NOTE — TELEPHONE ENCOUNTER
Pt called and updated coordinator plan is to get a fistula soon and reviewed other recent medical procedures (see chart for details). Hoping pt can be cleared by cardiology and medically stable soon so he can return for kidney waitlist appointments and can be activated on list. Pt is anticipating he can hopefully come in in a few months and will call coordinator back. Contact information provided.

## 2019-04-04 ENCOUNTER — TELEPHONE (OUTPATIENT)
Dept: SURGERY | Facility: CLINIC | Age: 64
End: 2019-04-04

## 2019-04-04 NOTE — TELEPHONE ENCOUNTER
Called and spoke with patient reminding him of appointment for 4/8/19 at 9:30AM after MRCP at 8AM.

## 2019-04-08 ENCOUNTER — HOSPITAL ENCOUNTER (OUTPATIENT)
Dept: MRI IMAGING | Facility: CLINIC | Age: 64
Discharge: HOME | End: 2019-04-08
Attending: INTERNAL MEDICINE | Admitting: INTERNAL MEDICINE
Payer: MEDICARE

## 2019-04-08 ENCOUNTER — OFFICE VISIT (OUTPATIENT)
Dept: SURGERY | Facility: CLINIC | Age: 64
End: 2019-04-08
Payer: MEDICARE

## 2019-04-08 VITALS
RESPIRATION RATE: 16 BRPM | BODY MASS INDEX: 24.57 KG/M2 | WEIGHT: 162.1 LBS | HEART RATE: 90 BPM | OXYGEN SATURATION: 100 % | DIASTOLIC BLOOD PRESSURE: 85 MMHG | HEIGHT: 68 IN | TEMPERATURE: 97.9 F | SYSTOLIC BLOOD PRESSURE: 126 MMHG

## 2019-04-08 DIAGNOSIS — E11.22 TYPE 2 DIABETES MELLITUS WITH STAGE 5 CHRONIC KIDNEY DISEASE NOT ON CHRONIC DIALYSIS, WITHOUT LONG-TERM CURRENT USE OF INSULIN (H): ICD-10-CM

## 2019-04-08 DIAGNOSIS — Z93.2 ILEOSTOMY STATUS (H): ICD-10-CM

## 2019-04-08 DIAGNOSIS — K57.20 PERFORATION OF SIGMOID COLON DUE TO DIVERTICULITIS: ICD-10-CM

## 2019-04-08 DIAGNOSIS — Z09 FOLLOW-UP EXAMINATION FOLLOWING SURGERY: Primary | ICD-10-CM

## 2019-04-08 DIAGNOSIS — N18.5 TYPE 2 DIABETES MELLITUS WITH STAGE 5 CHRONIC KIDNEY DISEASE NOT ON CHRONIC DIALYSIS, WITHOUT LONG-TERM CURRENT USE OF INSULIN (H): ICD-10-CM

## 2019-04-08 DIAGNOSIS — K86.2 PANCREATIC CYST: ICD-10-CM

## 2019-04-08 PROCEDURE — 74181 MRI ABDOMEN W/O CONTRAST: CPT

## 2019-04-08 ASSESSMENT — MIFFLIN-ST. JEOR: SCORE: 1495.29

## 2019-04-08 ASSESSMENT — PAIN SCALES - GENERAL: PAINLEVEL: NO PAIN (0)

## 2019-04-08 NOTE — LETTER
4/8/2019       RE: Murray Nicholson  [address]  [place] [address]     Dear Colleague,    Thank you for referring your patient, Murray Nicholson, to the [facility] COLON AND RECTAL SURGERY at [facility]. Please see a copy of my visit note below.    No notes on file    Again, thank you for allowing me to participate in the care of your patient.      Sincerely,    VERONICA Driver CNP

## 2019-04-08 NOTE — PROGRESS NOTES
"Colon and Rectal Surgery Postoperative Clinic Note    RE: Murray Nicholson  : 1955  FANI: 2019    Murray Nicholson is a very pleasant 64 year old male with a history of heart transplant done 18 (complicated by small bowel perforation requiring resection) and ESRD on dialysis who is status post sigmoidectomy with end colostomy for diverticulitis in 2018 followed by colostomy takedown with diverting loop ileostomy on 18. He is now status post loop ileostomy takedown, small bowel stapled, side-to-side anastomosis, repair of abdominal wall at ileostomy site on 3/27/2019 with Dr. Tran.  His postop course was significant for a potassium of 7.0 but was otherwise uneventful.  He was discharged home on 2019 and presents today for follow-up.    Interval history: Murray has been doing well since returning home.  He denies any pain.  He is having formed bowel movements a few times a day.  He denies any fecal incontinence.  He is eating and drinking without difficulty and tolerating a low residue diet.  He denies any nausea or vomiting.  He denies any fevers or chills.  He has not been followed up with his cardiac transplant clinic yet.    Physical Examination:   /85 (BP Location: Left arm, Patient Position: Sitting, Cuff Size: Adult Regular)   Pulse 90   Temp 97.9  F (36.6  C) (Oral)   Resp 16   Ht 5' 7.72\"   Wt 162 lb 1.6 oz   SpO2 100%   BMI 24.85 kg/m    General: Alert, oriented, in no acute distress, sitting comfortably  HEENT: Mucous membranes moist  Abdomen: Soft, nondistended, nontender on palpation.  Takedown site with 2 close drain in place.  This was removed.  Good granulation tissue in the base of the takedown site and no erythema.  External gauze dressing applied.    Assessment/Plan:  64 year old male status post loop ileostomy takedown, small bowel stapled, side-to-side anastomosis, repair of abdominal wall at ileostomy site on 3/27/2019 with Dr. Tran.  He is recovering " well from surgery.  Takedown site is healing well.  External gauze dressing only needed.  Low residue diet for another 2 weeks and then he can slowly add fiber back into his diet.  No lifting more than 10 pounds for full 6 weeks from surgery.  Would like him to follow-up with his cardiac transplant team in the next 1-2 weeks.  He is otherwise scheduled for follow-up with Dr. Tran on 4/22/2019.  Encouraged him to contact the clinic in the meantime with any questions or concerns. Patient's questions were answered to his stated satisfaction and he is in agreement with this plan.    Medical history:  Past Medical History:   Diagnosis Date     (HFpEF) heart failure with preserved ejection fraction (H)      Allergic rhinitis, cause unspecified      Anemia of chronic kidney failure      AS (aortic stenosis)      Ascending aortic aneurysm (H)      Bicuspid aortic valve      CAD (coronary artery disease)      Chronic kidney disease, stage 5 (H)      Congestive heart failure, unspecified      Dialysis patient (H)     Tues-Thur-Sat     Dyslipidemia      Esophageal reflux      ESRD (end stage renal disease) (H)      Hearing problem      Heart replaced by transplant (H) 12/10/2018     Hypersomnia with sleep apnea, unspecified      Hypertension      Ileostomy status (H)      Immunosuppression (H)      Kidney problem      MGUS (monoclonal gammopathy of unknown significance)      Mitral regurgitation      SHEELA (obstructive sleep apnea)     No CPAP     Pneumonia      Systolic heart failure (H)      Type 2 diabetes mellitus (H)        Surgical history:  Past Surgical History:   Procedure Laterality Date     CARDIAC SURGERY       COLONOSCOPY N/A 5/3/2018    Procedure: COLONOSCOPY;  colonoscopy ;  Surgeon: Ammon Castillo MD;  Location:  GI     CV HEART BIOPSY N/A 2/1/2019    Procedure: HBX;  Surgeon: Montrell Posada MD;  Location:  HEART CARDIAC CATH LAB     CV HEART BIOPSY N/A 3/22/2019    Procedure: HBX, RIJV ACCESS;   Surgeon: Jordan Fox MD;  Location: [facility]ARD[facility]     ESOPHAGOSCOPY, GASTROSCOPY, DUODENOSCOPY (EGD), COMBINED N/A 5/7/2018    Procedure: COMBINED ENDOSCOPIC ULTRASOUND, ESOPHAGOSCOPY, GASTROSCOPY, DUODENOSCOPY (EGD), FINE NEEDLE ASPIRATE/BIOPSY;  Endoscopic Ultrasound with Fine Needle Aspiration ;  Surgeon: Alon Don MD;  Location: [facility] OR     EXAM UNDER ANESTHESIA RECTUM N/A 8/12/2018    Procedure: EXAM UNDER ANESTHESIA RECTUM;  EXAM UNDER ANESTHESIA RECTUM ,COMBINED INCISION AND DRAINAGE OF RECTAL ABCESS ;  Surgeon: Rick Tran MD;  Location: [facility] OR     INCISION AND DRAINAGE RECTUM, COMBINED N/A 8/12/2018    Procedure: COMBINED INCISION AND DRAINAGE RECTUM;;  Surgeon: Rick Tran MD;  Location: [facility]     LAPAROSCOPIC ASSISTED COLOSTOMY TAKEDOWN N/A 12/11/2018    Procedure: Laparoscopic Assisted Colostomy Takedown, Laparoscopic Lysis of Adhesions;  Surgeon: Rick rTan MD;  Location: [facility]     LAPAROSCOPIC ASSISTED SIGMOID COLECTOMY N/A 8/14/2018    Procedure: LAPAROSCOPIC ASSISTED SIGMOID COLECTOMY;  Laparoscopic Hand Assisted Takedown of Splenic Flexure, Sigmoidectomy, Small Bowel Resection, Takedown of Small Bowel to Colon Fistula;  Surgeon: Rick Tran MD;  Location: [facility]     LAPAROSCOPIC HERNIORRHAPHY INGUINAL BILATERAL Bilateral 7/24/2015    Procedure: LAPAROSCOPIC HERNIORRHAPHY INGUINAL BILATERAL;  Surgeon: Bobby Mcconnell MD;  Location: [facility]     LAPAROSCOPIC INSERTION CATHETER PERITONEAL DIALYSIS N/A 6/22/2017    Procedure: LAPAROSCOPIC INSERTION CATHETER PERITONEAL DIALYSIS;  Laparoscopic Peritoneal Dialysis Catheter Placement - Anesthesia with block;  Surgeon: Esteban Arvizu MD;  Location: [facility]     PICC INSERTION Left 04/22/2018    5Fr - 49cm (3cm external), Basilic vein, low SVC     REMOVE CATHETER PERITONEAL Right 1/15/2018    Procedure: REMOVE CATHETER PERITONEAL;  Open Removal of Peritoneal Dialysis Catheter  ;  Surgeon: Esteban Arvizu MD;  Location: [facility]     SIGMOIDOSCOPY FLEXIBLE N/A 11/21/2018    Procedure: Examination Under Anesthesia, Flexible Sigmoidoscopy and Polypectomy;  Surgeon: Rick Tran MD;  Location: [facility]     SIGMOIDOSCOPY FLEXIBLE N/A 12/11/2018    Procedure: Flexible Sigmoidoscopy;  Surgeon: Rick Tran MD;  Location: [facility]     TAKEDOWN ILEOSTOMY N/A 3/27/2019    Procedure: Takedown Ileostomy;  Surgeon: Rick Tran MD;  Location: [facility]     TRANSPLANT HEART RECIPIENT N/A 6/14/2018    Procedure: TRANSPLANT HEART RECIPIENT;  Median Sternotomy, on-pump oxygenator, Heart Transplant;  Surgeon: Rony Caputo MD;  Location: [facility]       Problem list:    Patient Active Problem List    Diagnosis Date Noted     Ileostomy status (H) 03/27/2019     Priority: Medium     Colostomy status (H) 12/11/2018     Priority: Medium     Heart replaced by transplant (H) 12/10/2018     Priority: Medium     Added automatically from request for surgery [id number]       Sigmoid diverticulitis 08/14/2018     Priority: Medium     Bacteremia due to Pseudomonas 08/12/2018     Priority: Medium     Heart transplant recipient (H) 07/26/2018     Priority: Medium     Fever 07/26/2018     Priority: Medium     Leukopenia 07/11/2018     Priority: Medium     Heart transplanted (H) 06/15/2018     Priority: Medium     HRD    Donor CMV + / Recipient CMV -    Donor EBV +        Anemia in stage 5 chronic kidney disease (H) 03/17/2017     Priority: Medium     Anemia, iron deficiency 02/15/2017     Priority: Medium     Type 2 diabetes mellitus with diabetic chronic kidney disease (H) 10/14/2015     Priority: Medium     Hypertension      Priority: Medium     Dyslipidemia      Priority: Medium     MGUS (monoclonal gammopathy of unknown significance)      Priority: Medium     Ascending aortic aneurysm (H)      Priority: Medium     Anemia in chronic renal disease 05/19/2015     Priority: Medium     updating  diagnosis code for icd10 cutover       Anemia of chronic disease 04/12/2013     Priority: Medium     Problem list name updated by automated process. Provider to review and confirm       Hypertension goal BP (blood pressure) < 130/80 01/25/2011     Priority: Medium     Hyperlipidemia LDL goal <100 10/31/2010     Priority: Medium     Per provider       CKD (chronic kidney disease) stage 5, GFR less than 15 ml/min (H) 02/09/2010     Priority: Medium     Aortic stenosis 08/13/2008     Priority: Medium     Overview:   Bicuspid aortic valve       Pulmonary hypertension (H) 08/13/2008     Priority: Medium     Overview:   62 mm/Hg + RAP on 8/13/08 ECHO       Severe uncontrolled hypertension 08/13/2008     Priority: Medium     Allergic rhinitis 04/05/2006     Priority: Medium     Problem list name updated by automated process. Provider to review       Esophageal reflux 08/12/2004     Priority: Medium       Medications:  Current Outpatient Medications   Medication Sig Dispense Refill     acetaminophen (TYLENOL) 500 MG tablet Take 2 tablets (1,000 mg) by mouth 4 times daily 60 tablet 1     amLODIPine (NORVASC) 10 MG tablet Take 1 tablet (10 mg) by mouth daily 90 tablet 3     aspirin 81 MG EC tablet Take 81 mg by mouth every evening        atorvastatin (LIPITOR) 40 MG tablet Take 1 tablet (40 mg) by mouth daily 90 tablet 3     biotin (BIOTIN 5000) 5 MG CAPS Take 5 mg by mouth daily 30 capsule 3     blood glucose monitoring (ACCU-CHEK FASTCLIX) lancets Use to test blood sugar 2-3 times daily or as directed.  Ok to substitute alternative if insurance prefers. 102 each 11     blood glucose monitoring (NO BRAND SPECIFIED) test strip Use to test blood sugar 2-3 times daily or as directed. 100 each 11     cloNIDine (CATAPRES) 0.1 MG tablet Take 1 tablet (0.1 mg) by mouth At Bedtime 90 tablet 3     hydrALAZINE (APRESOLINE) 100 MG TABS tablet Take 1 tablet (100 mg) by mouth every 8 hours 90 tablet 11     insulin aspart (NOVOLOG PEN)  "100 UNIT/ML injection Inject 1-7 Units Subcutaneous 4 times daily (before meals and nightly) (Patient taking differently: Inject 1-7 Units Subcutaneous 4 times daily (before meals and nightly) Sliding scale) 9 mL 3     lisinopril (PRINIVIL/ZESTRIL) 10 MG tablet Take 1 tablet (10 mg) by mouth daily 90 tablet 3     loratadine (CLARITIN) 10 MG tablet Take 10 mg by mouth daily as needed Reported on 5/3/2017       melatonin 1 MG TABS tablet Take 2 tablets (2 mg) by mouth nightly as needed for sleep 120 tablet 1     mycophenolate (GENERIC EQUIVALENT) 250 MG capsule Take 3 capsules (750 mg) by mouth 2 times daily 180 capsule 11     NEPHROCAPS 1 MG capsule Take 1 capsule by mouth daily 120 capsule 0     pantoprazole (PROTONIX) 40 MG EC tablet Take 1 tablet (40 mg) by mouth 2 times daily (before meals) 60 tablet 11     tacrolimus (GENERIC EQUIVALENT) 1 MG capsule Take two capsules (2 mg) twice a day 120 capsule 11     traMADol (ULTRAM) 50 MG tablet Take 2 tablets (100 mg) by mouth every 12 hours as needed for severe pain 28 tablet 0     traMADol (ULTRAM) 50 MG tablet Take 50 mg by mouth every 6 hours as needed for severe pain       albuterol (PROAIR HFA/PROVENTIL HFA/VENTOLIN HFA) 108 (90 Base) MCG/ACT inhaler Inhale 2 puffs into the lungs every 4 hours as needed for shortness of breath / dyspnea or wheezing       fluticasone (FLONASE) 50 MCG/ACT spray Spray 1-2 sprays into both nostrils daily (Patient not taking: Reported on 4/8/2019) 16 g 11     order for DME Coloplast       1 piece convex light [place] cut up to 1  \" with filter #[id number]       Or  Shashi         1 piece soft convex 2 1/8\" #[id number]        Accessories   2\" barrier ring #[id number]                           Adapt paste #[id number]                           Adapt powder #[id number]                          No sting film barrier # [id number]                          Brava elastic barrier strips curved # [id number]    Treatment Diagnosis: New ileostomy (Patient not taking: Reported on " 2019) 30 each 11       Allergies:  Allergies   Allergen Reactions     Norco [Hydrocodone-Acetaminophen] Nausea and Vomiting     Cats      Throat tightness     Isosorbide Other (See Comments)     hypotension     Penicillins Hives     Seasonal Allergies      rhinitis     Shrimp      Throat closes        Family history:  Family History   Problem Relation Age of Onset     C.A.D. Father          from-never knew father-age 60     Diabetes Father      Cerebrovascular Disease Father      Hypertension Father      Hypertension No family hx of      Breast Cancer No family hx of      Cancer - colorectal No family hx of      Prostate Cancer No family hx of      Kidney Disease No family hx of      Melanoma No family hx of      Skin Cancer No family hx of        Social history:  Social History     Tobacco Use     Smoking status: Former Smoker     Packs/day: 1.00     Years: 19.00     Pack years: 19.00     Types: Cigarettes     Start date: 1984     Last attempt to quit: 1994     Years since quittin.6     Smokeless tobacco: Never Used   Substance Use Topics     Alcohol use: No     Alcohol/week: 0.0 oz     Marital status: legally .  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       VERONICA Talley, NP-C  Colon and Rectal Surgery  [facility]    This note was created using speech recognition software and may contain unintended word substitutions.

## 2019-04-08 NOTE — LETTER
"2019      RE: Murray Nicholson  [address]  [place] [address]       Colon and Rectal Surgery Postoperative Clinic Note    RE: Murray Nicholson  : 1955  FANI: 2019    Murray Nicholson is a very pleasant 64 year old male with a history of heart transplant done 18 (complicated by small bowel perforation requiring resection) and ESRD on dialysis who is status post sigmoidectomy with end colostomy for diverticulitis in 2018 followed by colostomy takedown with diverting loop ileostomy on 18. He is now status post loop ileostomy takedown, small bowel stapled, side-to-side anastomosis, repair of abdominal wall at ileostomy site on 3/27/2019 with Dr. Tran.  His postop course was significant for a potassium of 7.0 but was otherwise uneventful.  He was discharged home on 2019 and presents today for follow-up.    Interval history: Murray has been doing well since returning home.  He denies any pain.  He is having formed bowel movements a few times a day.  He denies any fecal incontinence.  He is eating and drinking without difficulty and tolerating a low residue diet.  He denies any nausea or vomiting.  He denies any fevers or chills.  He has not been followed up with his cardiac transplant clinic yet.    Physical Examination:   /85 (BP Location: Left arm, Patient Position: Sitting, Cuff Size: Adult Regular)   Pulse 90   Temp 97.9  F (36.6  C) (Oral)   Resp 16   Ht 5' 7.72\"   Wt 162 lb 1.6 oz   SpO2 100%   BMI 24.85 kg/m     General: Alert, oriented, in no acute distress, sitting comfortably  HEENT: Mucous membranes moist  Abdomen: Soft, nondistended, nontender on palpation.  Takedown site with 2 close drain in place.  This was removed.  Good granulation tissue in the base of the takedown site and no erythema.  External gauze dressing applied.    Assessment/Plan:  64 year old male status post loop ileostomy takedown, small bowel stapled, side-to-side anastomosis, repair " of abdominal wall at ileostomy site on 3/27/2019 with Dr. Tran.  He is recovering well from surgery.  Takedown site is healing well.  External gauze dressing only needed.  Low residue diet for another 2 weeks and then he can slowly add fiber back into his diet.  No lifting more than 10 pounds for full 6 weeks from surgery.  Would like him to follow-up with his cardiac transplant team in the next 1-2 weeks.  He is otherwise scheduled for follow-up with Dr. Tran on 4/22/2019.  Encouraged him to contact the clinic in the meantime with any questions or concerns. Patient's questions were answered to his stated satisfaction and he is in agreement with this plan.    Medical history:  Past Medical History:   Diagnosis Date     (HFpEF) heart failure with preserved ejection fraction (H)      Allergic rhinitis, cause unspecified      Anemia of chronic kidney failure      AS (aortic stenosis)      Ascending aortic aneurysm (H)      Bicuspid aortic valve      CAD (coronary artery disease)      Chronic kidney disease, stage 5 (H)      Congestive heart failure, unspecified      Dialysis patient (H)     Tues-Thur-Sat     Dyslipidemia      Esophageal reflux      ESRD (end stage renal disease) (H)      Hearing problem      Heart replaced by transplant (H) 12/10/2018     Hypersomnia with sleep apnea, unspecified      Hypertension      Ileostomy status (H)      Immunosuppression (H)      Kidney problem      MGUS (monoclonal gammopathy of unknown significance)      Mitral regurgitation      SHEELA (obstructive sleep apnea)     No CPAP     Pneumonia      Systolic heart failure (H)      Type 2 diabetes mellitus (H)        Surgical history:  Past Surgical History:   Procedure Laterality Date     CARDIAC SURGERY       COLONOSCOPY N/A 5/3/2018    Procedure: COLONOSCOPY;  colonoscopy ;  Surgeon: Ammon Castillo MD;  Location:  GI     CV HEART BIOPSY N/A 2/1/2019    Procedure: HBX;  Surgeon: Montrell Posada MD;  Location:   HEART CARDIAC CATH LAB     CV HEART BIOPSY N/A 3/22/2019    Procedure: HBX, RIJV ACCESS;  Surgeon: Jordan Fox MD;  [facility]     ESOPHAGOSCOPY, GASTROSCOPY, DUODENOSCOPY (EGD), COMBINED N/A 5/7/2018    Procedure: COMBINED ENDOSCOPIC ULTRASOUND, ESOPHAGOSCOPY, GASTROSCOPY, DUODENOSCOPY (EGD), FINE NEEDLE ASPIRATE/BIOPSY;  Endoscopic Ultrasound with Fine Needle Aspiration ;  Surgeon: Alon Don MD;  Location: [facility]     EXAM UNDER ANESTHESIA RECTUM N/A 8/12/2018    Procedure: EXAM UNDER ANESTHESIA RECTUM;  EXAM UNDER ANESTHESIA RECTUM ,COMBINED INCISION AND DRAINAGE OF RECTAL ABCESS ;  Surgeon: Rick Tran MD;  Location: [facility] OR     INCISION AND DRAINAGE RECTUM, COMBINED N/A 8/12/2018    Procedure: COMBINED INCISION AND DRAINAGE RECTUM;;  Surgeon: Rick Tran MD;  [facility]     LAPAROSCOPIC ASSISTED COLOSTOMY TAKEDOWN N/A 12/11/2018    Procedure: Laparoscopic Assisted Colostomy Takedown, Laparoscopic Lysis of Adhesions;  Surgeon: Rick Tran MD;  [facility]     LAPAROSCOPIC ASSISTED SIGMOID COLECTOMY N/A 8/14/2018    Procedure: LAPAROSCOPIC ASSISTED SIGMOID COLECTOMY;  Laparoscopic Hand Assisted Takedown of Splenic Flexure, Sigmoidectomy, Small Bowel Resection, Takedown of Small Bowel to Colon Fistula;  Surgeon: Rick Tran MD;  [facility]     LAPAROSCOPIC HERNIORRHAPHY INGUINAL BILATERAL Bilateral 7/24/2015    Procedure: LAPAROSCOPIC HERNIORRHAPHY INGUINAL BILATERAL;  Surgeon: Bobby Mcconnell MD;  [facility]     LAPAROSCOPIC INSERTION CATHETER PERITONEAL DIALYSIS N/A 6/22/2017    Procedure: LAPAROSCOPIC INSERTION CATHETER PERITONEAL DIALYSIS;  Laparoscopic Peritoneal Dialysis Catheter Placement - Anesthesia with block;  Surgeon: Esteban Arvizu MD;  [facility]     PICC INSERTION Left 04/22/2018    5Fr - 49cm (3cm external), Basilic vein, low SVC     REMOVE CATHETER PERITONEAL Right  1/15/2018    Procedure: REMOVE CATHETER PERITONEAL;  Open Removal of Peritoneal Dialysis Catheter ;  Surgeon: Esteban Arvizu MD;  [facility]     SIGMOIDOSCOPY FLEXIBLE N/A 11/21/2018    Procedure: Examination Under Anesthesia, Flexible Sigmoidoscopy and Polypectomy;  Surgeon: Rick Tran MD;  [facility]     SIGMOIDOSCOPY FLEXIBLE N/A 12/11/2018    Procedure: Flexible Sigmoidoscopy;  Surgeon: Rick Tran MD;  [facility]     TAKEDOWN ILEOSTOMY N/A 3/27/2019    Procedure: Takedown Ileostomy;  Surgeon: Rick Tran MD;  [facility]     TRANSPLANT HEART RECIPIENT N/A 6/14/2018    Procedure: TRANSPLANT HEART RECIPIENT;  Median Sternotomy, on-pump oxygenator, Heart Transplant;  Surgeon: Rony Caputo MD;  Location[facility]       Problem list:    Patient Active Problem List    Diagnosis Date Noted     Ileostomy status (H) 03/27/2019     Priority: Medium     Colostomy status (H) 12/11/2018     Priority: Medium     Heart replaced by transplant (H) 12/10/2018     Priority: Medium     Added automatically from request for surgery [id number]       Sigmoid diverticulitis 08/14/2018     Priority: Medium     Bacteremia due to Pseudomonas 08/12/2018     Priority: Medium     Heart transplant recipient (H) 07/26/2018     Priority: Medium     Fever 07/26/2018     Priority: Medium     Leukopenia 07/11/2018     Priority: Medium     Heart transplanted (H) 06/15/2018     Priority: Medium     HRD    Donor CMV + / Recipient CMV -    Donor EBV +        Anemia in stage 5 chronic kidney disease (H) 03/17/2017     Priority: Medium     Anemia, iron deficiency 02/15/2017     Priority: Medium     Type 2 diabetes mellitus with diabetic chronic kidney disease (H) 10/14/2015     Priority: Medium     Hypertension      Priority: Medium     Dyslipidemia      Priority: Medium     MGUS (monoclonal gammopathy of unknown significance)      Priority: Medium     Ascending aortic aneurysm (H)       Priority: Medium     Anemia in chronic renal disease 05/19/2015     Priority: Medium     updating diagnosis code for icd10 cutover       Anemia of chronic disease 04/12/2013     Priority: Medium     Problem list name updated by automated process. Provider to review and confirm       Hypertension goal BP (blood pressure) < 130/80 01/25/2011     Priority: Medium     Hyperlipidemia LDL goal <100 10/31/2010     Priority: Medium     Per provider       CKD (chronic kidney disease) stage 5, GFR less than 15 ml/min (H) 02/09/2010     Priority: Medium     Aortic stenosis 08/13/2008     Priority: Medium     Overview:   Bicuspid aortic valve       Pulmonary hypertension (H) 08/13/2008     Priority: Medium     Overview:   62 mm/Hg + RAP on 8/13/08 ECHO       Severe uncontrolled hypertension 08/13/2008     Priority: Medium     Allergic rhinitis 04/05/2006     Priority: Medium     Problem list name updated by automated process. Provider to review       Esophageal reflux 08/12/2004     Priority: Medium       Medications:  Current Outpatient Medications   Medication Sig Dispense Refill     acetaminophen (TYLENOL) 500 MG tablet Take 2 tablets (1,000 mg) by mouth 4 times daily 60 tablet 1     amLODIPine (NORVASC) 10 MG tablet Take 1 tablet (10 mg) by mouth daily 90 tablet 3     aspirin 81 MG EC tablet Take 81 mg by mouth every evening        atorvastatin (LIPITOR) 40 MG tablet Take 1 tablet (40 mg) by mouth daily 90 tablet 3     biotin (BIOTIN 5000) 5 MG CAPS Take 5 mg by mouth daily 30 capsule 3     blood glucose monitoring (ACCU-CHEK FASTCLIX) lancets Use to test blood sugar 2-3 times daily or as directed.  Ok to substitute alternative if insurance prefers. 102 each 11     blood glucose monitoring (NO BRAND SPECIFIED) test strip Use to test blood sugar 2-3 times daily or as directed. 100 each 11     cloNIDine (CATAPRES) 0.1 MG tablet Take 1 tablet (0.1 mg) by mouth At Bedtime 90 tablet 3     hydrALAZINE (APRESOLINE) 100 MG TABS  "tablet Take 1 tablet (100 mg) by mouth every 8 hours 90 tablet 11     insulin aspart (NOVOLOG PEN) 100 UNIT/ML injection Inject 1-7 Units Subcutaneous 4 times daily (before meals and nightly) (Patient taking differently: Inject 1-7 Units Subcutaneous 4 times daily (before meals and nightly) Sliding scale) 9 mL 3     lisinopril (PRINIVIL/ZESTRIL) 10 MG tablet Take 1 tablet (10 mg) by mouth daily 90 tablet 3     loratadine (CLARITIN) 10 MG tablet Take 10 mg by mouth daily as needed Reported on 5/3/2017       melatonin 1 MG TABS tablet Take 2 tablets (2 mg) by mouth nightly as needed for sleep 120 tablet 1     mycophenolate (GENERIC EQUIVALENT) 250 MG capsule Take 3 capsules (750 mg) by mouth 2 times daily 180 capsule 11     NEPHROCAPS 1 MG capsule Take 1 capsule by mouth daily 120 capsule 0     pantoprazole (PROTONIX) 40 MG EC tablet Take 1 tablet (40 mg) by mouth 2 times daily (before meals) 60 tablet 11     tacrolimus (GENERIC EQUIVALENT) 1 MG capsule Take two capsules (2 mg) twice a day 120 capsule 11     traMADol (ULTRAM) 50 MG tablet Take 2 tablets (100 mg) by mouth every 12 hours as needed for severe pain 28 tablet 0     traMADol (ULTRAM) 50 MG tablet Take 50 mg by mouth every 6 hours as needed for severe pain       albuterol (PROAIR HFA/PROVENTIL HFA/VENTOLIN HFA) 108 (90 Base) MCG/ACT inhaler Inhale 2 puffs into the lungs every 4 hours as needed for shortness of breath / dyspnea or wheezing       fluticasone (FLONASE) 50 MCG/ACT spray Spray 1-2 sprays into both nostrils daily (Patient not taking: Reported on 4/8/2019) 16 g 11     order for DME Coloplast       1 piece convex light Uriel cut up to 1  \" with filter #[id number]       Or  [employer]         1 piece soft convex 2 1/8\" #[id number]        Accessories   2\" barrier ring #[id number]                           Adapt paste #[id number]                           Adapt powder #2572                          No sting film barrier # 1394                          Brava elastic barrier " strips curved # [id number]    Treatment Diagnosis: New ileostomy (Patient not taking: Reported on 2019) 30 each 11       Allergies:  Allergies   Allergen Reactions     Norco [Hydrocodone-Acetaminophen] Nausea and Vomiting     Cats      Throat tightness     Isosorbide Other (See Comments)     hypotension     Penicillins Hives     Seasonal Allergies      rhinitis     Shrimp      Throat closes        Family history:  Family History   Problem Relation Age of Onset     C.A.D. Father          from-never knew father-age 60     Diabetes Father      Cerebrovascular Disease Father      Hypertension Father      Hypertension No family hx of      Breast Cancer No family hx of      Cancer - colorectal No family hx of      Prostate Cancer No family hx of      Kidney Disease No family hx of      Melanoma No family hx of      Skin Cancer No family hx of        Social history:  Social History     Tobacco Use     Smoking status: Former Smoker     Packs/day: 1.00     Years: 19.00     Pack years: 19.00     Types: Cigarettes     Start date: 1984     Last attempt to quit: 1994     Years since quittin.6     Smokeless tobacco: Never Used   Substance Use Topics     Alcohol use: No     Alcohol/week: 0.0 oz     Marital status: legally .  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       VERONICA Talley, NP-C  Colon and Rectal Surgery  [facility]    This note was created using speech recognition software and may contain unintended word substitutions.        VERONICA Talley CNP

## 2019-04-09 RX ORDER — BLOOD SUGAR DIAGNOSTIC
STRIP MISCELLANEOUS
Qty: 1 STRIP | Refills: 0 | OUTPATIENT
Start: 2019-04-09

## 2019-04-09 NOTE — TELEPHONE ENCOUNTER
"Requested Prescriptions   Pending Prescriptions Disp Refills     ACCU-CHEK GUIDE test strip [Pharmacy Med Name: ACCU-CHEK GUIDE TEST STRIPS 50] 100 strip 0     Sig: TEST BLOOD SUGAR 2 TO 3 TIMES DAILY OR AS DIRECTED       Diabetic Supplies Protocol Passed - 4/8/2019  6:49 AM        Passed - Medication is active on med list        Passed - Patient is 18 years of age or older        Passed - Recent (6 mo) or future (30 days) visit within the authorizing provider's specialty     Patient had office visit in the last 6 months or has a visit in the next 30 days with authorizing provider.  See \"Patient Info\" tab in inbasket, or \"Choose Columns\" in Meds & Orders section of the refill encounter.            Refused Prescriptions:                       Disp   Refills    ACCU-CHEK GUIDE test strip [Pharmacy Med N*1 strip0        Sig: TEST BLOOD SUGAR 2 TO 3 TIMES DAILY OR AS DIRECTED  Refused By: LISA JIMENEZ  Reason for Refusal: Duplicate      Closing encounter - no further actions needed at this time    Lisa Jimenez RN    "

## 2019-04-10 ENCOUNTER — TELEPHONE (OUTPATIENT)
Dept: TRANSPLANT | Facility: CLINIC | Age: 64
End: 2019-04-10

## 2019-04-10 DIAGNOSIS — Z94.1 HEART TRANSPLANT RECIPIENT (H): ICD-10-CM

## 2019-04-10 DIAGNOSIS — Z94.1 HEART REPLACED BY TRANSPLANT (H): ICD-10-CM

## 2019-04-10 DIAGNOSIS — K86.2 PANCREATIC CYST: Primary | ICD-10-CM

## 2019-04-10 LAB
ANION GAP SERPL CALCULATED.3IONS-SCNC: 9 MMOL/L (ref 3–14)
BUN SERPL-MCNC: 32 MG/DL (ref 7–30)
CALCIUM SERPL-MCNC: 9.2 MG/DL (ref 8.5–10.1)
CHLORIDE SERPL-SCNC: 98 MMOL/L (ref 94–109)
CO2 SERPL-SCNC: 26 MMOL/L (ref 20–32)
CREAT SERPL-MCNC: 5.89 MG/DL (ref 0.66–1.25)
ERYTHROCYTE [DISTWIDTH] IN BLOOD BY AUTOMATED COUNT: 13.8 % (ref 10–15)
GFR SERPL CREATININE-BSD FRML MDRD: 9 ML/MIN/{1.73_M2}
GLUCOSE SERPL-MCNC: 114 MG/DL (ref 70–99)
HCT VFR BLD AUTO: 35.2 % (ref 40–53)
HGB BLD-MCNC: 11.3 G/DL (ref 13.3–17.7)
MCH RBC QN AUTO: 29.4 PG (ref 26.5–33)
MCHC RBC AUTO-ENTMCNC: 32.1 G/DL (ref 31.5–36.5)
MCV RBC AUTO: 91 FL (ref 78–100)
PLATELET # BLD AUTO: 236 10E9/L (ref 150–450)
POTASSIUM SERPL-SCNC: 4.1 MMOL/L (ref 3.4–5.3)
RBC # BLD AUTO: 3.85 10E12/L (ref 4.4–5.9)
SODIUM SERPL-SCNC: 134 MMOL/L (ref 133–144)
TACROLIMUS BLD-MCNC: 4 UG/L (ref 5–15)
TME LAST DOSE: 2015 H
WBC # BLD AUTO: 3.6 10E9/L (ref 4–11)

## 2019-04-10 PROCEDURE — 80197 ASSAY OF TACROLIMUS: CPT | Performed by: INTERNAL MEDICINE

## 2019-04-10 RX ORDER — TACROLIMUS 1 MG/1
CAPSULE ORAL
Qty: 180 CAPSULE | Refills: 11 | Status: SHIPPED | OUTPATIENT
Start: 2019-04-10 | End: 2019-04-25

## 2019-04-10 NOTE — TELEPHONE ENCOUNTER
Pt called with lab and MRI results -   FK 4 - goal 6-8; dose increased from 2 mg BID to 3 mg BID  Recheck 3/22     Reviewed Abd MRI with Dr RAPP  Follow up MRI abdomen without contrast in a year and referral to GI clinic with Dr. Doni Voegl.   Thank you   TT   Pt notified of next steps.

## 2019-04-11 ENCOUNTER — TELEPHONE (OUTPATIENT)
Dept: FAMILY MEDICINE | Facility: CLINIC | Age: 64
End: 2019-04-11

## 2019-04-11 DIAGNOSIS — N18.5 TYPE 2 DIABETES MELLITUS WITH STAGE 5 CHRONIC KIDNEY DISEASE NOT ON CHRONIC DIALYSIS, WITHOUT LONG-TERM CURRENT USE OF INSULIN (H): ICD-10-CM

## 2019-04-11 DIAGNOSIS — E11.22 TYPE 2 DIABETES MELLITUS WITH STAGE 5 CHRONIC KIDNEY DISEASE NOT ON CHRONIC DIALYSIS, WITHOUT LONG-TERM CURRENT USE OF INSULIN (H): ICD-10-CM

## 2019-04-11 RX ORDER — LANCETS
EACH MISCELLANEOUS
Qty: 102 EACH | Refills: 1 | Status: SHIPPED | OUTPATIENT
Start: 2019-04-11 | End: 2019-07-08

## 2019-04-11 NOTE — TELEPHONE ENCOUNTER
Pt is upset that he can not fill his blood glucose monitoring (ACCU-CHEK FASTCLIX) lancets Rx. Pt wants to know why he has to come in and see PCP. Pt is very angry and rude to writer. Please advise if Pt needs to come in and see PCP or if he can get it refilled without seeing Dr. Turcios. It appears there was a refill request on 4/8 that was labeled a duplicate? Please assist. Thanks.     Jacquelin Le  Flex Patient Rep

## 2019-04-11 NOTE — TELEPHONE ENCOUNTER
Lab Results   Component Value Date    A1C 5.4 03/27/2019    A1C 6.1 12/03/2018    A1C 5.2 11/13/2018    A1C 7.0 07/18/2018    A1C 7.0 04/26/2018     Pt has ESRD    Last diabetic check was 12/28/18  by Pharm D . SMBG 1-2 times daily.Pt was to f/u in 3 months prn with him.     Pt last seen 2/23/18 by Dr Turcios but has has multiple appts with specialists since.     Attempted to reach, unable. Need to know which pharmacy   Lancets can be refilled so he does not run out. Left message  to call back. Need to know pharmacy. He hasn't seen Dr Turcios in one year so he was told he needs to be seen minimally by PCP Tam once a year.    Shauna Clemons, RN, BSN

## 2019-04-12 ENCOUNTER — CARE COORDINATION (OUTPATIENT)
Dept: GASTROENTEROLOGY | Facility: CLINIC | Age: 64
End: 2019-04-12

## 2019-04-12 NOTE — PROGRESS NOTES
Placed orders for MRI for 4/2020 for annual check of pancreatic cysts. Message future dated sent to scheduling.    Mellisa Sparrow, TONY Care Coordinator

## 2019-04-14 DIAGNOSIS — N18.5 TYPE 2 DIABETES MELLITUS WITH STAGE 5 CHRONIC KIDNEY DISEASE NOT ON CHRONIC DIALYSIS, WITHOUT LONG-TERM CURRENT USE OF INSULIN (H): ICD-10-CM

## 2019-04-14 DIAGNOSIS — E11.22 TYPE 2 DIABETES MELLITUS WITH STAGE 5 CHRONIC KIDNEY DISEASE NOT ON CHRONIC DIALYSIS, WITHOUT LONG-TERM CURRENT USE OF INSULIN (H): ICD-10-CM

## 2019-04-15 RX ORDER — BLOOD SUGAR DIAGNOSTIC
STRIP MISCELLANEOUS
Qty: 100 STRIP | Refills: 0 | Status: SHIPPED | OUTPATIENT
Start: 2019-04-15 | End: 2019-07-08

## 2019-04-15 NOTE — TELEPHONE ENCOUNTER
Patient is requesting for refill to be expedited. Said that he requested this last week but the wrong rx was sent. He needed the guide test strips vs the lancets (please do not cancel lancet refill that was sent on 4/11)  Please advise, thank you!     Kelsy Lange

## 2019-04-15 NOTE — TELEPHONE ENCOUNTER
Pt informed of med refill.   Pt is now scheduled for f/u and physical on 4/26 with PCP.       Gabby NELSON     [facility]

## 2019-04-15 NOTE — TELEPHONE ENCOUNTER
,  --Please contact patient  and ask him to schedule a six month diabetes visit with .   This appointment can serve as his annual preventative visit also.    --A refill order for below medication was sent to the pharmacy.

## 2019-04-18 NOTE — PROGRESS NOTES
"Colon and Rectal Surgery Follow-up Clinic Note      RE: Murray Nicholson  : 1955  FANI: 2019    DIAGNOSIS: Murray Nicholson is a very pleasant 64 year old male with a history of heart transplant done 18 and ESRD on hemodialysis who presents today for postoperative follow up.    HPI:     Status post sigmoidectomy with end colostomy for perforated diverticulitis in 2018.     Subsequent colostomy takedown with diverting loop ileostomy on 18.     He is now status post loop ileostomy takedown, small bowel stapled, side-to-side anastomosis, repair of abdominal wall at ileostomy site on 3/27/2019 with me.    His postop course was significant for a potassium of 7.0 but was otherwise uneventful.  He was discharged home on 2019 and saw Armida Gustafson NP on 19 for follow up.    INTERVAL HISTORY: Denies increased pain, fevers, or chills. Tolerating diet well. Having 2-4 BM's per day (mushy to formed, no blood, mild urgency and seepage with liquid BM's). Off narcotic pain meds.    Physical Examination:  /77 (BP Location: Left arm, Patient Position: Sitting, Cuff Size: Adult Regular)   Pulse 91   Ht 5' 7.72\"   Wt 160 lb 8 oz   SpO2 99%   BMI 24.61 kg/m    Abdomen soft, NT. Old left-sided ileostomy incision healing well with healthy granulation tissue. Incisions with no evidence of infection or hernia.    ASSESSMENT    FINAL DIAGNOSIS:   A: ILEUM, ILEOSTOMY:   - Small bowel and skin with granulation tissue   - No evidence of dysplasia or malignancy     B: HERNIA SAC, EXCISION:   - Mesothelial lined fibroadipose tissue with chronic inflammation, giant   cell reaction and focal fat necrosis   - No evidence of dysplasia or malignancy     Doing well postop.    PLAN  1. Transition to high fiber diet. Start Metamucil or Citrucel 1-2 times per day.  2. No lifting >10lbs for another 2 weeks.  3. Colonoscopy with me in .  4. RTC PRN.    Time spent: 30 minutes.  >50% spent in " discussing, counseling and coordinating care.    Rick Tran M.D., M.Sc.     Division of Colon and Rectal Surgery  [facility]    Referring Provider:  Klever Ernst MD   [address] [place] [id number]     Primary Care Provider:  Yahir Turcios    CC:      Rony Lawrence MD

## 2019-04-22 ENCOUNTER — OFFICE VISIT (OUTPATIENT)
Dept: SURGERY | Facility: CLINIC | Age: 64
End: 2019-04-22
Payer: MEDICARE

## 2019-04-22 VITALS
OXYGEN SATURATION: 99 % | SYSTOLIC BLOOD PRESSURE: 121 MMHG | WEIGHT: 160.5 LBS | HEART RATE: 91 BPM | HEIGHT: 68 IN | DIASTOLIC BLOOD PRESSURE: 77 MMHG | BODY MASS INDEX: 24.32 KG/M2

## 2019-04-22 DIAGNOSIS — Z94.1 HEART TRANSPLANT RECIPIENT (H): ICD-10-CM

## 2019-04-22 DIAGNOSIS — Z98.890 POSTOPERATIVE STATE: Primary | ICD-10-CM

## 2019-04-22 LAB
ANION GAP SERPL CALCULATED.3IONS-SCNC: 11 MMOL/L (ref 3–14)
BUN SERPL-MCNC: 36 MG/DL (ref 7–30)
CALCIUM SERPL-MCNC: 9.7 MG/DL (ref 8.5–10.1)
CHLORIDE SERPL-SCNC: 99 MMOL/L (ref 94–109)
CO2 SERPL-SCNC: 23 MMOL/L (ref 20–32)
CREAT SERPL-MCNC: 7.16 MG/DL (ref 0.66–1.25)
ERYTHROCYTE [DISTWIDTH] IN BLOOD BY AUTOMATED COUNT: 13.9 % (ref 10–15)
GFR SERPL CREATININE-BSD FRML MDRD: 7 ML/MIN/{1.73_M2}
GLUCOSE SERPL-MCNC: 126 MG/DL (ref 70–99)
HCT VFR BLD AUTO: 34.5 % (ref 40–53)
HGB BLD-MCNC: 11.1 G/DL (ref 13.3–17.7)
MAGNESIUM SERPL-MCNC: 1.4 MG/DL (ref 1.6–2.3)
MCH RBC QN AUTO: 29.1 PG (ref 26.5–33)
MCHC RBC AUTO-ENTMCNC: 32.2 G/DL (ref 31.5–36.5)
MCV RBC AUTO: 91 FL (ref 78–100)
PHOSPHATE SERPL-MCNC: 3.8 MG/DL (ref 2.5–4.5)
PLATELET # BLD AUTO: 264 10E9/L (ref 150–450)
POTASSIUM SERPL-SCNC: 4 MMOL/L (ref 3.4–5.3)
RBC # BLD AUTO: 3.81 10E12/L (ref 4.4–5.9)
SODIUM SERPL-SCNC: 134 MMOL/L (ref 133–144)
TACROLIMUS BLD-MCNC: 12.7 UG/L (ref 5–15)
TME LAST DOSE: NORMAL H
WBC # BLD AUTO: 3.5 10E9/L (ref 4–11)

## 2019-04-22 PROCEDURE — 80197 ASSAY OF TACROLIMUS: CPT | Performed by: INTERNAL MEDICINE

## 2019-04-22 ASSESSMENT — PAIN SCALES - GENERAL: PAINLEVEL: NO PAIN (0)

## 2019-04-22 ASSESSMENT — MIFFLIN-ST. JEOR: SCORE: 1488.08

## 2019-04-22 NOTE — LETTER
"2019       RE: Murray Nicholson  [address]  [place] [address]     Dear Colleague,    Thank you for referring your patient, Murray Nicholson, to the [facility] COLON AND RECTAL SURGERY at [facility]. Please see a copy of my visit note below.    Colon and Rectal Surgery Follow-up Clinic Note      RE: Murray Nicholson  : 1955  FANI: 2019    DIAGNOSIS: Murray Nicholson is a very pleasant 64 year old male with a history of heart transplant done 18 and ESRD on hemodialysis who presents today for postoperative follow up.    HPI:     Status post sigmoidectomy with end colostomy for perforated diverticulitis in 2018.     Subsequent colostomy takedown with diverting loop ileostomy on 18.     He is now status post loop ileostomy takedown, small bowel stapled, side-to-side anastomosis, repair of abdominal wall at ileostomy site on 3/27/2019 with me.    His postop course was significant for a potassium of 7.0 but was otherwise uneventful.  He was discharged home on 2019 and saw Armida Gustafson NP on 19 for follow up.    INTERVAL HISTORY: Denies increased pain, fevers, or chills. Tolerating diet well. Having 2-4 BM's per day (mushy to formed, no blood, mild urgency and seepage with liquid BM's). Off narcotic pain meds.    Physical Examination:  /77 (BP Location: Left arm, Patient Position: Sitting, Cuff Size: Adult Regular)   Pulse 91   Ht 5' 7.72\"   Wt 160 lb 8 oz   SpO2 99%   BMI 24.61 kg/m     Abdomen soft, NT. Old left-sided ileostomy incision healing well with healthy granulation tissue. Incisions with no evidence of infection or hernia.    ASSESSMENT    FINAL DIAGNOSIS:   A: ILEUM, ILEOSTOMY:   - Small bowel and skin with granulation tissue   - No evidence of dysplasia or malignancy     B: HERNIA SAC, EXCISION:   - Mesothelial lined fibroadipose tissue with chronic inflammation, giant   cell reaction and focal fat necrosis "   - No evidence of dysplasia or malignancy     Doing well postop.    PLAN  1. Transition to high fiber diet. Start Metamucil or Citrucel 1-2 times per day.  2. No lifting >10lbs for another 2 weeks.  3. Colonoscopy with me in 2021.  4. RTC PRN.    Time spent: 30 minutes.  >50% spent in discussing, counseling and coordinating care.    Rick Tran M.D., M.Sc.     Division of Colon and Rectal Surgery  [facility]    Referring Provider:  Klever Ernst MD   [address] [place], [place] [id number]  [place] [address]     Primary Care Provider:  Yahir Turcios    CC:      Rony Lawrence MD

## 2019-04-22 NOTE — NURSING NOTE
"Chief Complaint   Patient presents with     Surgical Followup     Date of surgery 3/27/2019.       Vitals:    04/22/19 1046   BP: 121/77   BP Location: Left arm   Patient Position: Sitting   Cuff Size: Adult Regular   Pulse: 91   SpO2: 99%   Weight: 160 lb 8 oz   Height: 5' 7.72\"       Body mass index is 24.61 kg/m .      Aicha Terrazas, EMT                      "

## 2019-04-23 DIAGNOSIS — Z01.818 PRE-OP EVALUATION: ICD-10-CM

## 2019-04-23 DIAGNOSIS — Z94.3 S/P HEART AND LUNG TRANSPLANT (H): ICD-10-CM

## 2019-04-23 DIAGNOSIS — Z99.2 ESRD ON DIALYSIS (H): Primary | ICD-10-CM

## 2019-04-23 DIAGNOSIS — N18.6 ESRD ON DIALYSIS (H): Primary | ICD-10-CM

## 2019-04-24 DIAGNOSIS — Z94.1 HEART REPLACED BY TRANSPLANT (H): Primary | ICD-10-CM

## 2019-04-25 ENCOUNTER — TELEPHONE (OUTPATIENT)
Dept: TRANSPLANT | Facility: CLINIC | Age: 64
End: 2019-04-25

## 2019-04-25 ENCOUNTER — PRE VISIT (OUTPATIENT)
Dept: SURGERY | Facility: CLINIC | Age: 64
End: 2019-04-25

## 2019-04-25 DIAGNOSIS — Z94.1 HEART TRANSPLANT RECIPIENT (H): ICD-10-CM

## 2019-04-25 RX ORDER — TACROLIMUS 1 MG/1
CAPSULE ORAL
Qty: 150 CAPSULE | Refills: 11 | Status: SHIPPED | OUTPATIENT
Start: 2019-04-25 | End: 2019-06-11

## 2019-04-25 NOTE — TELEPHONE ENCOUNTER
FUTURE VISIT INFORMATION      SURGERY INFORMATION:    Date: 19, 19    [facility], [facility] Cardiac [facility]    Surgeon:  Montrell Carrasco    Anesthesia Type:  Combined General with Block, Conscious Sedation    RECORDS REQUESTED FROM:       Primary Care Provider: Yahir Lui    Pertinent Medical History: Pulmonary hypertension, Ascending aortic aneurysm    Most recent EKG+ Tracin08- [facility]    Most recent ECHO: 19    Most recent Cardiac Stress Test: 3/8/19    Most recent PFT's: 18

## 2019-04-25 NOTE — TELEPHONE ENCOUNTER
Pt called with results - goal 6-8; dose decreased from 3 mg BID to 3/2. Recheck in ~2 weeks.         Mellisa Sparrow, TONY  Senst, TONY Moreira-     Dr. Don has reviewed imaging, finds it reassuring and is recommending follow up in 1 year as scheduled.No clinic needed unless patient would like to.     Mellisa      Pt provided with info.

## 2019-04-26 ENCOUNTER — OFFICE VISIT (OUTPATIENT)
Dept: FAMILY MEDICINE | Facility: CLINIC | Age: 64
End: 2019-04-26
Payer: MEDICARE

## 2019-04-26 VITALS
HEART RATE: 86 BPM | BODY MASS INDEX: 24.04 KG/M2 | WEIGHT: 153.2 LBS | DIASTOLIC BLOOD PRESSURE: 41 MMHG | HEIGHT: 67 IN | SYSTOLIC BLOOD PRESSURE: 99 MMHG | OXYGEN SATURATION: 99 % | RESPIRATION RATE: 16 BRPM | TEMPERATURE: 97.8 F

## 2019-04-26 DIAGNOSIS — Z23 NEED FOR ZOSTER VACCINE: ICD-10-CM

## 2019-04-26 DIAGNOSIS — Z12.5 SCREENING PSA (PROSTATE SPECIFIC ANTIGEN): ICD-10-CM

## 2019-04-26 DIAGNOSIS — Z13.89 SCREENING FOR DIABETIC PERIPHERAL NEUROPATHY: ICD-10-CM

## 2019-04-26 DIAGNOSIS — E11.69 TYPE 2 DIABETES MELLITUS WITH OTHER SPECIFIED COMPLICATION, WITHOUT LONG-TERM CURRENT USE OF INSULIN (H): ICD-10-CM

## 2019-04-26 DIAGNOSIS — Z00.00 ANNUAL VISIT FOR GENERAL ADULT MEDICAL EXAMINATION WITHOUT ABNORMAL FINDINGS: Primary | ICD-10-CM

## 2019-04-26 PROCEDURE — 99207 C FOOT EXAM  NO CHARGE: CPT | Mod: 25 | Performed by: FAMILY MEDICINE

## 2019-04-26 PROCEDURE — 99396 PREV VISIT EST AGE 40-64: CPT | Performed by: FAMILY MEDICINE

## 2019-04-26 ASSESSMENT — ENCOUNTER SYMPTOMS
HEARTBURN: 0
FREQUENCY: 0
DIARRHEA: 0
JOINT SWELLING: 0
COUGH: 0
WEAKNESS: 0
EYE PAIN: 0
ABDOMINAL PAIN: 0
MYALGIAS: 0
PARESTHESIAS: 0
HEADACHES: 0
HEMATURIA: 0
DIZZINESS: 1
DYSURIA: 0
NAUSEA: 0
ARTHRALGIAS: 0
CONSTIPATION: 0
FEVER: 0
NERVOUS/ANXIOUS: 0
HEMATOCHEZIA: 0
PALPITATIONS: 0
SORE THROAT: 0
SHORTNESS OF BREATH: 0
CHILLS: 1

## 2019-04-26 ASSESSMENT — MIFFLIN-ST. JEOR: SCORE: 1443.54

## 2019-04-26 ASSESSMENT — ACTIVITIES OF DAILY LIVING (ADL): CURRENT_FUNCTION: NO ASSISTANCE NEEDED

## 2019-04-26 NOTE — PATIENT INSTRUCTIONS
Patient Education   Personalized Prevention Plan  You are due for the preventive services outlined below.  Your care team is available to assist you in scheduling these services.  If you have already completed any of these items, please share that information with your care team to update in your medical record.  Health Maintenance Due   Topic Date Due     Heart Failure Action Plan Reviewed Every 3 Years  1955     Zoster (Shingles) Vaccine (2 of 3) 06/04/2015     Microalbumin Lab - yearly  05/19/2016     Diabetic Foot Exam - yearly  11/08/2017     Eye Exam - yearly  06/06/2018

## 2019-04-26 NOTE — PROGRESS NOTES
"SUBJECTIVE:   Murray Nicholson is a 64 year old male who presents for Preventive Visit.  Are you in the first 12 months of your Medicare coverage?  No    Appeite decreased, ok in the morning and afternoon, but by evening not hungry, eats very little, has noticed since out of the hospital this last time (March). No difficulty swallowing. Thinks maybe just because he has trained his body to not eat as much in order to lose weight in the last.     Colonoscopy up to date - next one in 2021 per GI     No urinary frequency, night time awakenings, urinary incontinence    Exercise - Regular stretching and exercise on the elliptical      No concern for STIs.     Healthy Habits:     In general, how would you rate your overall health?  Good    Frequency of exercise:  2-3 days/week    Duration of exercise:  15-30 minutes    Do you usually eat at least 4 servings of fruit and vegetables a day, include whole grains    & fiber and avoid regularly eating high fat or \"junk\" foods?  No    Taking medications regularly:  Yes    Medication side effects:  None    Ability to successfully perform activities of daily living:  No assistance needed    Home Safety:  No safety concerns identified    Hearing Impairment:  Difficulty following a conversation in a noisy restaurant or crowded room, need to ask people to speak up or repeat themselves and difficulty understanding soft or whispered speech    In the past 6 months, have you been bothered by leaking of urine?  No    In general, how would you rate your overall mental or emotional health?  Good      PHQ-2 Total Score: 0    Additional concerns today:  No    Do you feel safe in your environment? Yes    Do you have a Health Care Directive? Yes: Advance Directive has been received and scanned.    Fall risk     click delete button to remove this line now  Cognitive Screening   1) Repeat 3 items (Leader, Season, Table)    2) Clock draw:   NORMAL  3) 3 item recall:   Recalls 2 objects   Results: " ABNORMAL clock, 1-2 items recalled: PROBABLE COGNITIVE IMPAIRMENT, **INFORM PROVIDER**    Mini-CogTM Copyright S Arabella. Licensed by the author for use in [facility]; reprinted with permission (caron@.[facility]). All rights reserved.      Do you have sleep apnea, excessive snoring or daytime drowsiness?: no    Reviewed and updated as needed this visit by clinical staff  Tobacco  Allergies  Med Hx  Surg Hx  Fam Hx  Soc Hx      Reviewed and updated as needed this visit by Provider        Social History     Tobacco Use     Smoking status: Former Smoker     Packs/day: 1.00     Years: 19.00     Pack years: 19.00     Types: Cigarettes     Start date: 1984     Last attempt to quit: 1994     Years since quittin.7     Smokeless tobacco: Never Used   Substance Use Topics     Alcohol use: No     Alcohol/week: 0.0 oz     Alcohol Use 2019   Prescreen: >3 drinks/day or >7 drinks/week? No   Prescreen: >3 drinks/day or >7 drinks/week? -     Current providers sharing in care for this patient include:  Patient Care Team:  Yahir Turcios MD as PCP - General (Family Practice)  Arcelia Blum MD as MD (Cardiology)  Bobby Mcconnell MD as MD (Surgery)  Yahir Turcios MD as Assigned PCP  Amrita Tobin RN as Nurse Coordinator (Thoracic Surgery (Cardiothoracic Vascular Surgery))  Kristi Armas PA as Physician Assistant (Physician Assistant)  Jaky Canela as Clinic Care Coordinator  Ana Luisa Mcpherson MD as MD (Nephrology)  Lillie Ulloa RN as Transplant Coordinator  [facility] ([facility] ([facility]), ([place]))  Abram Moulton MD as MD (Family Practice)  Sunita Dobbins, TONY as Specialty Care Coordinator (Colon and Rectal Surgery)  Kaylin Fay, VERONICA CNP as Nurse Practitioner (Nurse Practitioner)    The following health maintenance items are reviewed in Epic and correct as of today:  Health Maintenance   Topic Date Due     HF ACTION PLAN Q3 YR  1955      ZOSTER IMMUNIZATION (2 of 3) 06/04/2015     MICROALBUMIN Q1 YEAR  05/19/2016     FOOT EXAM Q1 YEAR  11/08/2017     EYE EXAM Q1 YEAR  06/06/2018     A1C Q6 MO  09/27/2019     BMP Q6 MOS  10/22/2019     LIPID MONITORING Q1 YEAR  12/03/2019     MEDICARE ANNUAL WELLNESS VISIT  01/07/2020     ALT Q1 YR  02/19/2020     TSH W/ FREE T4 REFLEX Q2 YEAR  04/16/2020     CREATININE Q1 YEAR  04/22/2020     BMP Q1 YR  04/22/2020     HEMOGLOBIN Q1 YR  04/22/2020     CBC Q1 YR  04/22/2020     DTAP/TDAP/TD IMMUNIZATION (3 - Td) 02/28/2023     COLONOSCOPY Q5 YR  05/03/2023     ADVANCE DIRECTIVE PLANNING Q5 YRS  12/12/2023     HIV SCREEN (SYSTEM ASSIGNED)  Completed     PHQ-2  Completed     INFLUENZA VACCINE  Completed     HEPATITIS C SCREENING  Completed     IPV IMMUNIZATION  Aged Out     MENINGITIS IMMUNIZATION  Aged Out     Labs reviewed in EPIC  BP Readings from Last 3 Encounters:   04/26/19 99/41   04/22/19 121/77   04/08/19 126/85    Wt Readings from Last 3 Encounters:   04/26/19 69.5 kg (153 lb 3.2 oz)   04/22/19 72.8 kg (160 lb 8 oz)   04/08/19 73.5 kg (162 lb 1.6 oz)         Recent Labs   Lab Test 04/22/19  0923 04/10/19  0828  03/27/19  2203  02/19/19  0931  12/28/18  0938  12/15/18  0907  12/03/18  0845  11/13/18  0849  09/10/18  0640  09/03/18  0616  07/18/18  0841  04/16/18  1546  02/21/18  0900   A1C  --   --   --  5.4  --   --   --   --   --   --   --  6.1*  --  5.2  --   --   --   --   --  7.0*   < >  --    < >  --    LDL  --   --   --   --   --   --   --   --   --   --   --  49  --   --   --   --   --   --   --  66  --  60  --   --    HDL  --   --   --   --   --   --   --   --   --   --   --  65  --   --   --   --   --   --   --  92  --  46  --   --    TRIG  --   --   --   --   --   --   --   --   --   --   --  201*  --   --   --  71  --  103   < > 88  --  233*  --   --        Review of Systems   Constitutional: Positive for chills. Negative for fever.   HENT: Positive for hearing loss. Negative for congestion, ear  "pain and sore throat.    Eyes: Negative for pain and visual disturbance.   Respiratory: Negative for cough and shortness of breath.    Cardiovascular: Negative for chest pain, palpitations and peripheral edema.   Gastrointestinal: Negative for abdominal pain, constipation, diarrhea, heartburn, hematochezia and nausea.   Genitourinary: Negative for discharge, dysuria, frequency, genital sores, hematuria, impotence and urgency.   Musculoskeletal: Negative for arthralgias, joint swelling and myalgias.   Skin: Negative for rash.   Neurological: Positive for dizziness. Negative for weakness, headaches and paresthesias.   Psychiatric/Behavioral: Negative for mood changes. The patient is not nervous/anxious.      OBJECTIVE:   BP 99/41   Pulse 86   Temp 97.8  F (36.6  C) (Oral)   Resp 16   Ht 1.702 m (5' 7\")   Wt 69.5 kg (153 lb 3.2 oz)   SpO2 99%   BMI 23.99 kg/m   Estimated body mass index is 23.99 kg/m  as calculated from the following:    Height as of this encounter: 1.702 m (5' 7\").    Weight as of this encounter: 69.5 kg (153 lb 3.2 oz).  Physical Exam  GENERAL: healthy, alert and no distress  HENT: ear canals and TM's normal, nose and mouth without ulcers or lesions  NECK: no adenopathy, no asymmetry, masses, or scars and thyroid normal to palpation  RESP: lungs clear to auscultation - no rales, rhonchi or wheezes  CV: regular rate and rhythm, normal S1 S2, no S3 or S4, no murmur, click or rub, no peripheral edema and peripheral pulses strong  ABDOMEN: multiple scars over abdomen, healing ostomy site in LLQ, soft, nontender, no hepatosplenomegaly, no masses and bowel sounds normal  MS: no gross musculoskeletal defects noted, no edema  EXTREMITIES: extremities normal- no gross deformities noted, gait normal and normal muscle tone  NEURO: Patellar reflexes 3+  FOOT: bilateral feet without callus, ulcer, or other deformity, nails well trimmed, sensation intact to pin prick, light touch over entire " "foot    Diagnostic Test Results:  PSA - future  HgbA1c - future     ASSESSMENT / PLAN:   1. Annual visit for general adult medical examination without abnormal findings    2. Type 2 diabetes mellitus with other specified complication, without long-term current use of insulin (H)  Last hgb A1c 3/27/19 was 5.4. Pt checking bg daily and well controlled. Discussed trial off insulin until next hgb a1c recheck. Pt will contact me if bg running >300.   - Hemoglobin A1c; Future    3. Screening for diabetic peripheral neuropathy  - FOOT EXAM  NO CHARGE [45055.114]    4. Screening PSA (prostate specific antigen)  Ordered. Will obtain with next lab draw.   - PSA, screen; Future    5. Need for zoster vaccine  Pt wanting to contact cardiology office to see if there are any contraindications to this. Ordered, he should follow up at a pharmacy if he is interested.     End of Life Planning:  Patient currently has an advanced directive: Yes.  Practitioner is supportive of decision.    COUNSELING:  Reviewed preventive health counseling, as reflected in patient instructions       Regular exercise       Healthy diet/nutrition       Bladder control       Immunizations       Colon cancer screening       Prostate cancer screening    BP Readings from Last 1 Encounters:   04/26/19 99/41     Estimated body mass index is 23.99 kg/m  as calculated from the following:    Height as of this encounter: 1.702 m (5' 7\").    Weight as of this encounter: 69.5 kg (153 lb 3.2 oz).     reports that he quit smoking about 24 years ago. His smoking use included cigarettes. He started smoking about 35 years ago. He has a 19.00 pack-year smoking history. He has never used smokeless tobacco.    Appropriate preventive services were discussed with this patient, including applicable screening as appropriate for cardiovascular disease, diabetes, osteopenia/osteoporosis, and glaucoma.  As appropriate for age/gender, discussed screening for colorectal cancer, " prostate cancer, breast cancer, and cervical cancer. Checklist reviewing preventive services available has been given to the patient.    Reviewed patients plan of care and provided an AVS. The Complex Care Plan (for patients with higher acuity and needing more deliberate coordination of services) for Murray meets the Care Plan requirement. This Care Plan has been established and reviewed with the Patient.    Counseling Resources:  ATP IV Guidelines  Pooled Cohorts Equation Calculator  Breast Cancer Risk Calculator  FRAX Risk Assessment  ICSI Preventive Guidelines  Dietary Guidelines for Americans, 2010  [employer]'s MyPlate  ASA Prophylaxis  Lung CA Screening    Edwige Albrecht, MS3     In supervising the medical student I repeated the exam and pertinent history as documented above. I have reviewed and edited the medical students documentation appropriately above. The students documentation above is correct.     Yahir Turcios MD  [facility]    Identified Health Risks:

## 2019-04-27 ENCOUNTER — TELEPHONE (OUTPATIENT)
Dept: TRANSPLANT | Facility: CLINIC | Age: 64
End: 2019-04-27

## 2019-04-27 NOTE — TELEPHONE ENCOUNTER
Pt called to ask if he could get the Shingrix vaccine recommended by his PCP. Enc pt to obtain vaccine.

## 2019-04-29 ENCOUNTER — PRE VISIT (OUTPATIENT)
Dept: TRANSPLANT | Facility: CLINIC | Age: 64
End: 2019-04-29

## 2019-04-29 ENCOUNTER — ANESTHESIA EVENT (OUTPATIENT)
Dept: SURGERY | Facility: CLINIC | Age: 64
End: 2019-04-29

## 2019-04-29 ENCOUNTER — OFFICE VISIT (OUTPATIENT)
Dept: SURGERY | Facility: CLINIC | Age: 64
End: 2019-04-29
Attending: CLINICAL NURSE SPECIALIST
Payer: MEDICARE

## 2019-04-29 VITALS
BODY MASS INDEX: 25.11 KG/M2 | HEIGHT: 67 IN | WEIGHT: 160 LBS | TEMPERATURE: 97.9 F | HEART RATE: 70 BPM | DIASTOLIC BLOOD PRESSURE: 71 MMHG | OXYGEN SATURATION: 98 % | SYSTOLIC BLOOD PRESSURE: 116 MMHG

## 2019-04-29 DIAGNOSIS — Z01.818 PRE-OP EXAMINATION: Primary | ICD-10-CM

## 2019-04-29 DIAGNOSIS — Z12.5 PROSTATE CANCER SCREENING: ICD-10-CM

## 2019-04-29 DIAGNOSIS — Z79.899 ENCOUNTER FOR LONG-TERM (CURRENT) USE OF HIGH-RISK MEDICATION: ICD-10-CM

## 2019-04-29 DIAGNOSIS — Z13.220 ENCOUNTER FOR LIPID SCREENING FOR CARDIOVASCULAR DISEASE: ICD-10-CM

## 2019-04-29 DIAGNOSIS — Z13.6 ENCOUNTER FOR LIPID SCREENING FOR CARDIOVASCULAR DISEASE: ICD-10-CM

## 2019-04-29 DIAGNOSIS — Z94.1 HEART REPLACED BY TRANSPLANT (H): Primary | ICD-10-CM

## 2019-04-29 ASSESSMENT — LIFESTYLE VARIABLES: TOBACCO_USE: 1

## 2019-04-29 ASSESSMENT — MIFFLIN-ST. JEOR: SCORE: 1474.39

## 2019-04-29 NOTE — ANESTHESIA PREPROCEDURE EVALUATION
Anesthesia Pre-Procedure Evaluation    Patient: Murray Nicholson   MRN:     [id number] Gender:   male   Age:    64 year old :      1955        Preoperative Diagnosis: * No surgery found *        Past Medical History:   Diagnosis Date     (HFpEF) heart failure with preserved ejection fraction (H)      Allergic rhinitis, cause unspecified      Anemia of chronic kidney failure      AS (aortic stenosis)      Ascending aortic aneurysm (H)      Bicuspid aortic valve      CAD (coronary artery disease)      Chronic kidney disease, stage 5 (H)      Congestive heart failure, unspecified      Dialysis patient (H)     Tues-Thur-Sat     Dyslipidemia      Esophageal reflux      ESRD (end stage renal disease) (H)      Hearing problem      Heart replaced by transplant (H) 12/10/2018     Hypersomnia with sleep apnea, unspecified      Hypertension      Ileostomy status (H)      Immunosuppression (H)      Kidney problem      MGUS (monoclonal gammopathy of unknown significance)      Mitral regurgitation      SHEELA (obstructive sleep apnea)     No CPAP     Pneumonia      Systolic heart failure (H)      Type 2 diabetes mellitus (H)       Past Surgical History:   Procedure Laterality Date     CARDIAC SURGERY       COLONOSCOPY N/A 5/3/2018    Procedure: COLONOSCOPY;  colonoscopy ;  Surgeon: Ammon Castillo MD;  Location:  GI     CV HEART BIOPSY N/A 2019    Procedure: HBX;  Surgeon: Montrell Posada MD;  Location:  HEART CARDIAC CATH LAB     CV HEART BIOPSY N/A 3/22/2019    Procedure: HBX, RIJV ACCESS;  Surgeon: Jordan Fox MD;  Location:  HEART CARDIAC CATH LAB     ESOPHAGOSCOPY, GASTROSCOPY, DUODENOSCOPY (EGD), COMBINED N/A 2018    Procedure: COMBINED ENDOSCOPIC ULTRASOUND, ESOPHAGOSCOPY, GASTROSCOPY, DUODENOSCOPY (EGD), FINE NEEDLE ASPIRATE/BIOPSY;  Endoscopic Ultrasound with Fine Needle Aspiration ;  Surgeon: Alon Don MD;  [facility]     EXAM UNDER ANESTHESIA RECTUM N/A 2018     Procedure: EXAM UNDER ANESTHESIA RECTUM;  EXAM UNDER ANESTHESIA RECTUM ,COMBINED INCISION AND DRAINAGE OF RECTAL ABCESS ;  Surgeon: Rick Tran MD;  [facility]     INCISION AND DRAINAGE RECTUM, COMBINED N/A 8/12/2018    Procedure: COMBINED INCISION AND DRAINAGE RECTUM;;  Surgeon: Rick Tran MD;  [facility]     LAPAROSCOPIC ASSISTED COLOSTOMY TAKEDOWN N/A 12/11/2018    Procedure: Laparoscopic Assisted Colostomy Takedown, Laparoscopic Lysis of Adhesions;  Surgeon: Rick Tran MD;  [facility]     LAPAROSCOPIC ASSISTED SIGMOID COLECTOMY N/A 8/14/2018    Procedure: LAPAROSCOPIC ASSISTED SIGMOID COLECTOMY;  Laparoscopic Hand Assisted Takedown of Splenic Flexure, Sigmoidectomy, Small Bowel Resection, Takedown of Small Bowel to Colon Fistula;  Surgeon: Rick Tran MD;  [facility]     LAPAROSCOPIC HERNIORRHAPHY INGUINAL BILATERAL Bilateral 7/24/2015    Procedure: LAPAROSCOPIC HERNIORRHAPHY INGUINAL BILATERAL;  Surgeon: Bobby Mcconnell MD;  [facility]     LAPAROSCOPIC INSERTION CATHETER PERITONEAL DIALYSIS N/A 6/22/2017    Procedure: LAPAROSCOPIC INSERTION CATHETER PERITONEAL DIALYSIS;  Laparoscopic Peritoneal Dialysis Catheter Placement - Anesthesia with block;  Surgeon: Esteban Arvizu MD;  [facility]     PICC INSERTION Left 04/22/2018    5Fr - 49cm (3cm external), Basilic vein, low SVC     REMOVE CATHETER PERITONEAL Right 1/15/2018    Procedure: REMOVE CATHETER PERITONEAL;  Open Removal of Peritoneal Dialysis Catheter ;  Surgeon: Esteban Arvizu MD;  [facility]     SIGMOIDOSCOPY FLEXIBLE N/A 11/21/2018    Procedure: Examination Under Anesthesia, Flexible Sigmoidoscopy and Polypectomy;  Surgeon: Rick Tran MD;  [facility]     SIGMOIDOSCOPY FLEXIBLE N/A 12/11/2018    Procedure: Flexible Sigmoidoscopy;  Surgeon: Rick Tran MD;  [facility]     TAKEDOWN ILEOSTOMY N/A 3/27/2019    Procedure:  Takedown Ileostomy;  Surgeon: Rick Tran MD;  [facility]     TRANSPLANT HEART RECIPIENT N/A 6/14/2018    Procedure: TRANSPLANT HEART RECIPIENT;  Median Sternotomy, on-pump oxygenator, Heart Transplant;  Surgeon: Rony Caputo MD;  [facility]          Anesthesia Evaluation     .  Type: General and MAC    No history of anesthetic complications          ROS/MED HX    ENT/Pulmonary:     (+)sleep apnea (Does not use CPAP>>>has lost 70 pounds and thinks he may not have it. ), allergic rhinitis, tobacco use (Quit 25 years ago.  25 pack years hx. ), Past use uses CPAP , . .    Neurologic:  - neg neurologic ROS     Cardiovascular:     (+) hypertension----. : . . . :. . Previous cardiac testing Echodate:12/2018results:date: results: date: results: date: results:          METS/Exercise Tolerance: Comment: Elliptical  three times a week.  Does band exercises for upper body strength.  >4 METS   Hematologic:     (+) History of blood clots (Line related.  Completed anticoagulation treatment.) pt is not anticoagulated, History of Transfusion no previous transfusion reaction -      Musculoskeletal:  - neg musculoskeletal ROS       GI/Hepatic:     (+) GERD Asymptomatic on medication,       Renal/Genitourinary:     (+) chronic renal disease, type: ESRD, Pt requires dialysis, type: Hemodialysis, Pt has no history of transplant,       Endo:     (+) type I DM, Last HgA1c: 5.4 date: 3/27/19 Using insulin - not using insulin pump Normal glucose range: BG in morning 112-138 not previously admitted for DM/DKA .      Psychiatric:         Infectious Disease:  - neg infectious disease ROS       Malignancy:      - no malignancy   Other:    (+) No chance of pregnancy C-spine cleared: Yes, no H/O Chronic Pain,no other significant disability                        PHYSICAL EXAM:   Mental Status/Neuro: A/A/O   Airway: Facies: Feasible  Mallampati: I  Mouth/Opening: Full  TM distance: > 6 cm  Neck ROM: Full  "  Respiratory: Auscultation: CTAB     Resp. Rate: Normal     Resp. Effort: Normal      CV: Rhythm: Regular  Rate: Age appropriate  Heart: Normal Sounds   Comments:      Dental: Normal                  Lab Results   Component Value Date    WBC 3.5 (L) 04/22/2019    HGB 11.1 (L) 04/22/2019    HCT 34.5 (L) 04/22/2019     04/22/2019    CRP <2.9 03/22/2019    SED 33 (H) 09/17/2018     04/22/2019    POTASSIUM 4.0 04/22/2019    CHLORIDE 99 04/22/2019    CO2 23 04/22/2019    BUN 36 (H) 04/22/2019    CR 7.16 (H) 04/22/2019     (H) 04/22/2019    TRINI 9.7 04/22/2019    PHOS 3.8 04/22/2019    MAG 1.4 (L) 04/22/2019    ALBUMIN 3.4 02/19/2019    PROTTOTAL 7.2 02/19/2019    ALT 23 02/19/2019    AST 27 02/19/2019    ALKPHOS 350 (H) 02/19/2019    BILITOTAL 0.7 02/19/2019    AMYLASE 62 06/14/2018    PTT 37 08/12/2018    INR 1.05 09/10/2018    FIBR 407 08/12/2018    TSH 2.25 04/16/2018       Preop Vitals  BP Readings from Last 3 Encounters:   04/29/19 116/71   04/26/19 99/41   04/22/19 121/77    Pulse Readings from Last 3 Encounters:   04/29/19 70   04/26/19 86   04/22/19 91      Resp Readings from Last 3 Encounters:   04/26/19 16   04/08/19 16   04/01/19 18    SpO2 Readings from Last 3 Encounters:   04/29/19 98%   04/26/19 99%   04/22/19 99%      Temp Readings from Last 1 Encounters:   04/29/19 97.9  F (36.6  C)    Ht Readings from Last 1 Encounters:   04/29/19 1.702 m (5' 7\")      Wt Readings from Last 1 Encounters:   04/29/19 72.6 kg (160 lb)    Estimated body mass index is 25.06 kg/m  as calculated from the following:    Height as of this encounter: 1.702 m (5' 7\").    Weight as of this encounter: 72.6 kg (160 lb).     LDA:  CVC Double Lumen 04/17/18 Right Subclavian (Active)   Site Assessment WDL 4/1/2019  7:41 AM   Lumen Soln/Vol REFERENCE 2.1/2.1 12/15/2018 11:46 AM   External Cath Length (cm) 4 cm 12/13/2018 10:45 AM   Dressing Intervention Chlorhexidine sponge;Transparent 4/1/2019  7:41 AM   Dressing Change " Due 04/04/19 3/31/2019 11:48 PM   CVC Comment CDI 3/27/2019  9:00 PM   Lumen A - Color RED 3/27/2019  9:00 PM   Lumen A - Status saline locked 3/27/2019  9:00 PM   Lumen B - Color BLUE 3/27/2019  9:00 PM   Lumen B - Status saline locked 3/27/2019  9:00 PM   Extravasation? No 3/27/2019  9:00 PM   Number of days: 377       Open Drain Left Abdomen Open penrose drain (Active)   Site Description UTV 4/1/2019  7:41 AM   Dressing Status Normal: Clean, dry & intact 4/1/2019  7:41 AM   Dressing Change Due 04/02/19 4/1/2019  7:41 AM   Drainage Appearance Serosanguenous 3/31/2019 11:47 PM   Status Unclamped 4/1/2019  7:41 AM   Number of days: 33            JZG FV AN PLAN NO PONV RULE         PAC Discussion and Assessment    ASA Classification: 3  Case is suitable for: [place]  Anesthetic techniques and relevant risks discussed: GA with regional block for post-op pain control  Invasive monitoring and risk discussed:   Types:   Possibility and Risk of blood transfusion discussed:   NPO instructions given:   Additional anesthetic preparation and risks discussed:   Needs early admission to pre-op area:   Other:     PAC Resident/NP Anesthesia Assessment:  Murray Nicholson is a 64-year-old male scheduled forLeft Upper Extremity Arteriovenous Bovine Graft Creation   on 5/8/19 with Dr. Cheney at [facility] under general anesthesia with block. Mr. Nicholson has a past medical history for open heart transplant (6/2018), ESRD, HTN, perforated diverticulitis s/p sigmoidectomy with end-colostomy and small bowel resection for fistula in August 2018,  ileostomy takedown (3/27), and h/o hyperkalemia.    Procedures  Echocardiogram 12/2018  Interpretation Summary  Global and regional left ventricular function is hyperkinetic with an EF of  65-70%.  Right ventricular function, chamber size, wall motion, and thickness are  normal.  No significant valvular abnormalities.  The peak velocity of the tricuspid regurgitant jet is not obtainable.  The  inferior vena cava was normal in size with preserved respiratory  variability.  No pericardial effusion is present.     Compared to prior study dated 09/11/18, no significant interval change.    He has the following specific operative considerations:   - METS:  >4. RCRI : Diabetes Mellitus (on Insulin) and Cr>2.  6.6 % risk of major adverse cardiac event.  - Untreated SHEELA:  Since diagnosis of SHEELA, has lost 70 pounds.  Recommend repeat sleep study.    - VTE risk:  3%  - Risk of PONV score = 1-2.  If > 2, anti-emetic intervention recommended.      #  Cardiology -   S/P heart transplant 6/2018 for heart failure and valvular heart disease>>>reports doing well with good exercise tolerance.  Take immunosuppression and statin as prescribed.  Denies chest pain, SOB, palpitations, syncope, MEADE, orthopnea, or PND.  EF 65-70% on echocardiogram 12/2019.  HTN, take CCB, clonidine and hydralazine DOS.  Hold ACE-I DOS. Hold ASA for seven days prior to surgery.    #  Pulmonary - remote smoking hx       - Seasonal allergies:  uses albuterol as needed for infrequent reactive airway as related to seasonal allergies.  Denies issues with seasonal allergies currently and is not taking his fluticasone or loratadine.   #  Hematology - reports h/o central line clot completing anticoagulation in the past.        - Anemia related to renal disease: Recommend use of blood conservation techniques intraoperatively and close monitoring of postoperative bleeding.    #  GI - PPI prophylaxis, take DOS.       - Recent ileostomy take down with Dr. Tran.  Reports doing well and following up as scheduled.     #  Renal - ESRD, HD Tues, Thurs, Sat.  Access currently right upper chest. AV Bovine graft planned as above. Episode of hyperkalemia, being followed with labs through dialysis. K+ 4.0 (4/22/19).  #  Endocrine - Diabetes, insulin dependent well controlled with HgbA1C 5.4: Hold  short acting insulin DOS. Denies oral agents or long acting insulin  use.  Recommend close monitoring of the patient's blood glucose levels throughout the perioperative period and treat per [place] guidelines.      - Anesthesia considerations:  Refer to PAC assessment in anesthesia records  - Labs per dialysis      Arrival time, NPO, shower and medication instructions provided by nursing staff today.  Preparing For Your Surgery handout given.  Patient was discussed with Dr Barroso.    Reviewed and Signed by PAC Mid-Level Provider/Resident  Mid-Level Provider/Resident: Kaylin MARTIN CNP  Date: 4/29/2019  Time: 11:07    Attending Anesthesiologist Anesthesia Assessment:        Anesthesiologist:   Date:   Time:   Pass/Fail:   Disposition:     PAC Pharmacist Assessment:        Pharmacist:   Date:   Time:        VERONICA Pavon CNP

## 2019-04-29 NOTE — PATIENT INSTRUCTIONS
Preparing for Your Surgery      Name:  Murray Nicholson   MRN:  [id number]   :  1955   Today's Date:  2019     Arriving for surgery:   Surgery date:  2019  Arrival time:  5:15 am  Please come to:       [facility] [address]  [address] [place]  [address]    - ? parking is available in front of the hospital      -    Please proceed to [address] on the 3rd floor. [phone]?     - ?If you are in need of directions, wheelchair or escort please stop at the Information Desk in the lobby.  Inform the information person that you are here for surgery; a wheelchair and escort to [address] will be provided.?     What can I eat or drink?  -  You may have solid food or milk products until 8 hours prior to your surgery. (19 at 11 pm)  -  You may have water, apple juice or 7up/Sprite until 2 hours prior to your surgery.(until 5:15 am arrival time)    Which medicines can I take?        Stop Aspirin, vitamins and supplements one week prior to surgery.      Hold Ibuprofen for 24 hours and/or Naproxen for 48 hours prior to surgery.     -  Do NOT take these medications in the morning, the day of surgery:     Lisinopril       -  Please take these medications the day of surgery:     All other usual am prescription medications       How do I prepare myself?  -  Take two showers: one the night before surgery; and one the morning of surgery.         Use Scrubcare or Hibiclens to wash from neck down.  You may use your own shampoo and conditioner. No other hair products.   -  Do NOT use lotion, powder, deodorant, or antiperspirant the day of your surgery.  -  Do NOT wear any makeup, fingernail polish or jewelry.  - Do not bring your own medications to the hospital, except for inhalers and eye drops.  -  Bring your ID and insurance card.    Questions or Concerns:  -If you have questions or concerns regarding the day of surgery, please call [phone].       -For questions  after surgery please call your surgeons office.
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
## 2019-05-01 ENCOUNTER — DOCUMENTATION ONLY (OUTPATIENT)
Dept: SURGERY | Facility: CLINIC | Age: 64
End: 2019-05-01

## 2019-05-01 NOTE — PROGRESS NOTES
Received LTD form from [employer] for patient. Filled out form with restrictions of 10lb weight restriction until 4/6/19. Patient to f/u with Colon and Rectal surgery PRN. Faxed form back to [phone]. Copy sent to scanning to be placed in his chart.    Shefali Singletary LPN

## 2019-05-07 ENCOUNTER — ANESTHESIA EVENT (OUTPATIENT)
Dept: SURGERY | Facility: CLINIC | Age: 64
End: 2019-05-07
Payer: MEDICARE

## 2019-05-08 ENCOUNTER — HOSPITAL ENCOUNTER (OUTPATIENT)
Facility: CLINIC | Age: 64
Discharge: HOME | End: 2019-05-08
Attending: SURGERY | Admitting: SURGERY
Payer: MEDICARE

## 2019-05-08 ENCOUNTER — ANESTHESIA (OUTPATIENT)
Dept: SURGERY | Facility: CLINIC | Age: 64
End: 2019-05-08
Payer: MEDICARE

## 2019-05-08 VITALS
HEART RATE: 86 BPM | RESPIRATION RATE: 16 BRPM | OXYGEN SATURATION: 99 % | DIASTOLIC BLOOD PRESSURE: 77 MMHG | HEIGHT: 69 IN | SYSTOLIC BLOOD PRESSURE: 128 MMHG | BODY MASS INDEX: 23.87 KG/M2 | WEIGHT: 161.16 LBS | TEMPERATURE: 98 F

## 2019-05-08 DIAGNOSIS — I10 ESSENTIAL HYPERTENSION: ICD-10-CM

## 2019-05-08 DIAGNOSIS — I10 SEVERE UNCONTROLLED HYPERTENSION: ICD-10-CM

## 2019-05-08 DIAGNOSIS — E78.5 HYPERLIPIDEMIA LDL GOAL <100: ICD-10-CM

## 2019-05-08 DIAGNOSIS — I71.21 ASCENDING AORTIC ANEURYSM (H): ICD-10-CM

## 2019-05-08 DIAGNOSIS — N18.5 ANEMIA IN STAGE 5 CHRONIC KIDNEY DISEASE (H): ICD-10-CM

## 2019-05-08 DIAGNOSIS — N18.5 CKD (CHRONIC KIDNEY DISEASE) STAGE 5, GFR LESS THAN 15 ML/MIN (H): Primary | ICD-10-CM

## 2019-05-08 DIAGNOSIS — D63.8 ANEMIA OF CHRONIC DISORDER: ICD-10-CM

## 2019-05-08 DIAGNOSIS — N18.6 ESRD ON DIALYSIS (H): ICD-10-CM

## 2019-05-08 DIAGNOSIS — N18.4 TYPE 2 DIABETES MELLITUS WITH STAGE 4 CHRONIC KIDNEY DISEASE, WITHOUT LONG-TERM CURRENT USE OF INSULIN (H): ICD-10-CM

## 2019-05-08 DIAGNOSIS — Z94.1 HEART TRANSPLANT RECIPIENT (H): ICD-10-CM

## 2019-05-08 DIAGNOSIS — Z93.3 COLOSTOMY STATUS (H): ICD-10-CM

## 2019-05-08 DIAGNOSIS — B96.5 BACTEREMIA DUE TO PSEUDOMONAS: ICD-10-CM

## 2019-05-08 DIAGNOSIS — K57.32 SIGMOID DIVERTICULITIS: ICD-10-CM

## 2019-05-08 DIAGNOSIS — E78.5 DYSLIPIDEMIA: ICD-10-CM

## 2019-05-08 DIAGNOSIS — Z94.1 HEART TRANSPLANTED (H): ICD-10-CM

## 2019-05-08 DIAGNOSIS — D47.2 MGUS (MONOCLONAL GAMMOPATHY OF UNKNOWN SIGNIFICANCE): ICD-10-CM

## 2019-05-08 DIAGNOSIS — I10 HYPERTENSION GOAL BP (BLOOD PRESSURE) < 130/80: ICD-10-CM

## 2019-05-08 DIAGNOSIS — Z94.1 HEART REPLACED BY TRANSPLANT (H): ICD-10-CM

## 2019-05-08 DIAGNOSIS — Z93.2 ILEOSTOMY STATUS (H): ICD-10-CM

## 2019-05-08 DIAGNOSIS — I27.20 PULMONARY HYPERTENSION (H): ICD-10-CM

## 2019-05-08 DIAGNOSIS — D50.9 IRON DEFICIENCY ANEMIA, UNSPECIFIED IRON DEFICIENCY ANEMIA TYPE: ICD-10-CM

## 2019-05-08 DIAGNOSIS — R50.81 FEVER IN OTHER DISEASES: ICD-10-CM

## 2019-05-08 DIAGNOSIS — E11.22 TYPE 2 DIABETES MELLITUS WITH STAGE 4 CHRONIC KIDNEY DISEASE, WITHOUT LONG-TERM CURRENT USE OF INSULIN (H): ICD-10-CM

## 2019-05-08 DIAGNOSIS — Z99.2 ESRD ON DIALYSIS (H): ICD-10-CM

## 2019-05-08 DIAGNOSIS — D63.1 ANEMIA IN STAGE 5 CHRONIC KIDNEY DISEASE (H): ICD-10-CM

## 2019-05-08 DIAGNOSIS — R78.81 BACTEREMIA DUE TO PSEUDOMONAS: ICD-10-CM

## 2019-05-08 LAB
ABO + RH BLD: NORMAL
ABO + RH BLD: NORMAL
APTT PPP: 33 SEC (ref 22–37)
BASOPHILS # BLD AUTO: 0 10E9/L (ref 0–0.2)
BASOPHILS NFR BLD AUTO: 1 %
BLD GP AB SCN SERPL QL: NORMAL
BLOOD BANK CMNT PATIENT-IMP: NORMAL
CREAT SERPL-MCNC: 5.11 MG/DL (ref 0.66–1.25)
DIFFERENTIAL METHOD BLD: ABNORMAL
EOSINOPHIL # BLD AUTO: 0.1 10E9/L (ref 0–0.7)
EOSINOPHIL NFR BLD AUTO: 3.2 %
ERYTHROCYTE [DISTWIDTH] IN BLOOD BY AUTOMATED COUNT: 15.1 % (ref 10–15)
GFR SERPL CREATININE-BSD FRML MDRD: 11 ML/MIN/{1.73_M2}
GLUCOSE BLDC GLUCOMTR-MCNC: 130 MG/DL (ref 70–99)
GLUCOSE BLDC GLUCOMTR-MCNC: 130 MG/DL (ref 70–99)
GLUCOSE SERPL-MCNC: 126 MG/DL (ref 70–99)
HCT VFR BLD AUTO: 28.7 % (ref 40–53)
HGB BLD-MCNC: 8.6 G/DL (ref 13.3–17.7)
IMM GRANULOCYTES # BLD: 0 10E9/L (ref 0–0.4)
IMM GRANULOCYTES NFR BLD: 0.3 %
INR PPP: 1.07 (ref 0.86–1.14)
LYMPHOCYTES # BLD AUTO: 1.2 10E9/L (ref 0.8–5.3)
LYMPHOCYTES NFR BLD AUTO: 37.3 %
MCH RBC QN AUTO: 28.8 PG (ref 26.5–33)
MCHC RBC AUTO-ENTMCNC: 30 G/DL (ref 31.5–36.5)
MCV RBC AUTO: 96 FL (ref 78–100)
MONOCYTES # BLD AUTO: 0.6 10E9/L (ref 0–1.3)
MONOCYTES NFR BLD AUTO: 18 %
NEUTROPHILS # BLD AUTO: 1.3 10E9/L (ref 1.6–8.3)
NEUTROPHILS NFR BLD AUTO: 40.2 %
NRBC # BLD AUTO: 0 10*3/UL
NRBC BLD AUTO-RTO: 0 /100
PLATELET # BLD AUTO: 246 10E9/L (ref 150–450)
POTASSIUM BLD-SCNC: 4 MMOL/L (ref 3.4–5.3)
POTASSIUM SERPL-SCNC: 4 MMOL/L (ref 3.4–5.3)
RBC # BLD AUTO: 2.99 10E12/L (ref 4.4–5.9)
SPECIMEN EXP DATE BLD: NORMAL
WBC # BLD AUTO: 3.1 10E9/L (ref 4–11)

## 2019-05-08 PROCEDURE — 25000132 ZZH RX MED GY IP 250 OP 250 PS 637: Performed by: ANESTHESIOLOGY

## 2019-05-08 PROCEDURE — 84132 ASSAY OF SERUM POTASSIUM: CPT | Performed by: CLINICAL NURSE SPECIALIST

## 2019-05-08 PROCEDURE — 84132 ASSAY OF SERUM POTASSIUM: CPT | Mod: 91 | Performed by: ANESTHESIOLOGY

## 2019-05-08 PROCEDURE — 25800030 ZZH RX IP 258 OP 636: Performed by: NURSE ANESTHETIST, CERTIFIED REGISTERED

## 2019-05-08 PROCEDURE — 27210794 ZZH OR GENERAL SUPPLY STERILE: Performed by: SURGERY

## 2019-05-08 PROCEDURE — 71000016 ZZH RECOVERY PHASE 1 LEVEL 3 FIRST HR: Performed by: SURGERY

## 2019-05-08 PROCEDURE — 37000009 ZZH ANESTHESIA TECHNICAL FEE, EACH ADDTL 15 MIN: Performed by: SURGERY

## 2019-05-08 PROCEDURE — 85610 PROTHROMBIN TIME: CPT | Performed by: CLINICAL NURSE SPECIALIST

## 2019-05-08 PROCEDURE — 25000128 H RX IP 250 OP 636: Performed by: SURGERY

## 2019-05-08 PROCEDURE — 25000128 H RX IP 250 OP 636: Performed by: CLINICAL NURSE SPECIALIST

## 2019-05-08 PROCEDURE — A9270 NON-COVERED ITEM OR SERVICE: HCPCS | Performed by: ANESTHESIOLOGY

## 2019-05-08 PROCEDURE — 82962 GLUCOSE BLOOD TEST: CPT | Mod: 91

## 2019-05-08 PROCEDURE — 25000128 H RX IP 250 OP 636: Performed by: STUDENT IN AN ORGANIZED HEALTH CARE EDUCATION/TRAINING PROGRAM

## 2019-05-08 PROCEDURE — 25000128 H RX IP 250 OP 636: Performed by: ANESTHESIOLOGY

## 2019-05-08 PROCEDURE — 25800030 ZZH RX IP 258 OP 636: Performed by: SURGERY

## 2019-05-08 PROCEDURE — 36000057 ZZH SURGERY LEVEL 3 1ST 30 MIN - UMMC: Performed by: SURGERY

## 2019-05-08 PROCEDURE — 82565 ASSAY OF CREATININE: CPT | Performed by: CLINICAL NURSE SPECIALIST

## 2019-05-08 PROCEDURE — 86901 BLOOD TYPING SEROLOGIC RH(D): CPT | Performed by: ANESTHESIOLOGY

## 2019-05-08 PROCEDURE — 25000125 ZZHC RX 250: Performed by: ANESTHESIOLOGY

## 2019-05-08 PROCEDURE — 40000171 ZZH STATISTIC PRE-PROCEDURE ASSESSMENT III: Performed by: SURGERY

## 2019-05-08 PROCEDURE — 86850 RBC ANTIBODY SCREEN: CPT | Performed by: ANESTHESIOLOGY

## 2019-05-08 PROCEDURE — 37000008 ZZH ANESTHESIA TECHNICAL FEE, 1ST 30 MIN: Performed by: SURGERY

## 2019-05-08 PROCEDURE — 25000128 H RX IP 250 OP 636: Performed by: NURSE ANESTHETIST, CERTIFIED REGISTERED

## 2019-05-08 PROCEDURE — 27211024 ZZHC OR SUPPLY OTHER OPNP: Performed by: SURGERY

## 2019-05-08 PROCEDURE — 71000027 ZZH RECOVERY PHASE 2 EACH 15 MINS: Performed by: SURGERY

## 2019-05-08 PROCEDURE — 85730 THROMBOPLASTIN TIME PARTIAL: CPT | Performed by: CLINICAL NURSE SPECIALIST

## 2019-05-08 PROCEDURE — 25800030 ZZH RX IP 258 OP 636: Performed by: CLINICAL NURSE SPECIALIST

## 2019-05-08 PROCEDURE — 86900 BLOOD TYPING SEROLOGIC ABO: CPT | Performed by: ANESTHESIOLOGY

## 2019-05-08 PROCEDURE — 36000059 ZZH SURGERY LEVEL 3 EA 15 ADDTL MIN UMMC: Performed by: SURGERY

## 2019-05-08 PROCEDURE — 25000565 ZZH ISOFLURANE, EA 15 MIN: Performed by: SURGERY

## 2019-05-08 PROCEDURE — 85025 COMPLETE CBC W/AUTO DIFF WBC: CPT | Performed by: CLINICAL NURSE SPECIALIST

## 2019-05-08 PROCEDURE — C1763 CONN TISS, NON-HUMAN: HCPCS | Performed by: SURGERY

## 2019-05-08 PROCEDURE — 82947 ASSAY GLUCOSE BLOOD QUANT: CPT | Performed by: CLINICAL NURSE SPECIALIST

## 2019-05-08 PROCEDURE — 25000131 ZZH RX MED GY IP 250 OP 636 PS 637: Performed by: SURGERY

## 2019-05-08 DEVICE — GRAFT ARTERY BOVINE CAROTID ARTEGRAFT 6MMX30CM AG730: Type: IMPLANTABLE DEVICE | Site: ARM | Status: FUNCTIONAL

## 2019-05-08 RX ORDER — NALOXONE HYDROCHLORIDE 0.4 MG/ML
.1-.4 INJECTION, SOLUTION INTRAMUSCULAR; INTRAVENOUS; SUBCUTANEOUS
Status: DISCONTINUED | OUTPATIENT
Start: 2019-05-08 | End: 2019-05-08 | Stop reason: HOSPADM

## 2019-05-08 RX ORDER — SODIUM CHLORIDE 9 MG/ML
INJECTION, SOLUTION INTRAVENOUS CONTINUOUS PRN
Status: DISCONTINUED | OUTPATIENT
Start: 2019-05-08 | End: 2019-05-08

## 2019-05-08 RX ORDER — MEPERIDINE HYDROCHLORIDE 25 MG/ML
12.5 INJECTION INTRAMUSCULAR; INTRAVENOUS; SUBCUTANEOUS
Status: DISCONTINUED | OUTPATIENT
Start: 2019-05-08 | End: 2019-05-08 | Stop reason: HOSPADM

## 2019-05-08 RX ORDER — FENTANYL CITRATE 50 UG/ML
25-50 INJECTION, SOLUTION INTRAMUSCULAR; INTRAVENOUS
Status: DISCONTINUED | OUTPATIENT
Start: 2019-05-08 | End: 2019-05-08 | Stop reason: HOSPADM

## 2019-05-08 RX ORDER — CLOPIDOGREL BISULFATE 75 MG/1
75 TABLET ORAL DAILY
Qty: 30 TABLET | Refills: 0 | Status: SHIPPED | OUTPATIENT
Start: 2019-05-08 | End: 2019-09-24

## 2019-05-08 RX ORDER — PROPOFOL 10 MG/ML
INJECTION, EMULSION INTRAVENOUS PRN
Status: DISCONTINUED | OUTPATIENT
Start: 2019-05-08 | End: 2019-05-08

## 2019-05-08 RX ORDER — IPRATROPIUM BROMIDE AND ALBUTEROL SULFATE 2.5; .5 MG/3ML; MG/3ML
3 SOLUTION RESPIRATORY (INHALATION) ONCE
Status: COMPLETED | OUTPATIENT
Start: 2019-05-08 | End: 2019-05-08

## 2019-05-08 RX ORDER — SODIUM CHLORIDE 9 MG/ML
INJECTION, SOLUTION INTRAVENOUS CONTINUOUS
Status: DISCONTINUED | OUTPATIENT
Start: 2019-05-08 | End: 2019-05-08 | Stop reason: HOSPADM

## 2019-05-08 RX ORDER — HYDROMORPHONE HYDROCHLORIDE 1 MG/ML
.3-.5 INJECTION, SOLUTION INTRAMUSCULAR; INTRAVENOUS; SUBCUTANEOUS EVERY 10 MIN PRN
Status: DISCONTINUED | OUTPATIENT
Start: 2019-05-08 | End: 2019-05-08 | Stop reason: HOSPADM

## 2019-05-08 RX ORDER — OXYCODONE HYDROCHLORIDE 5 MG/1
5-10 TABLET ORAL EVERY 6 HOURS PRN
Qty: 10 TABLET | Refills: 0 | Status: SHIPPED | OUTPATIENT
Start: 2019-05-08 | End: 2019-06-11

## 2019-05-08 RX ORDER — VANCOMYCIN HYDROCHLORIDE 1 G/200ML
1000 INJECTION, SOLUTION INTRAVENOUS
Status: COMPLETED | OUTPATIENT
Start: 2019-05-08 | End: 2019-05-08

## 2019-05-08 RX ORDER — ONDANSETRON 2 MG/ML
INJECTION INTRAMUSCULAR; INTRAVENOUS PRN
Status: DISCONTINUED | OUTPATIENT
Start: 2019-05-08 | End: 2019-05-08

## 2019-05-08 RX ORDER — LABETALOL 20 MG/4 ML (5 MG/ML) INTRAVENOUS SYRINGE
10
Status: DISCONTINUED | OUTPATIENT
Start: 2019-05-08 | End: 2019-05-08 | Stop reason: HOSPADM

## 2019-05-08 RX ORDER — FENTANYL CITRATE 50 UG/ML
INJECTION, SOLUTION INTRAMUSCULAR; INTRAVENOUS PRN
Status: DISCONTINUED | OUTPATIENT
Start: 2019-05-08 | End: 2019-05-08

## 2019-05-08 RX ORDER — ONDANSETRON 4 MG/1
4 TABLET, ORALLY DISINTEGRATING ORAL EVERY 30 MIN PRN
Status: DISCONTINUED | OUTPATIENT
Start: 2019-05-08 | End: 2019-05-08 | Stop reason: HOSPADM

## 2019-05-08 RX ORDER — LIDOCAINE 40 MG/G
CREAM TOPICAL
Status: DISCONTINUED | OUTPATIENT
Start: 2019-05-08 | End: 2019-05-08 | Stop reason: HOSPADM

## 2019-05-08 RX ORDER — TRAMADOL HYDROCHLORIDE 50 MG/1
25-50 TABLET ORAL EVERY 6 HOURS PRN
Qty: 10 TABLET | Refills: 0 | Status: ON HOLD | OUTPATIENT
Start: 2019-05-08 | End: 2019-06-07

## 2019-05-08 RX ORDER — FENTANYL CITRATE 50 UG/ML
25-50 INJECTION, SOLUTION INTRAMUSCULAR; INTRAVENOUS EVERY 5 MIN PRN
Status: DISCONTINUED | OUTPATIENT
Start: 2019-05-08 | End: 2019-05-08 | Stop reason: HOSPADM

## 2019-05-08 RX ORDER — ACETAMINOPHEN 325 MG/1
650 TABLET ORAL EVERY 4 HOURS PRN
Qty: 50 TABLET | Refills: 0 | COMMUNITY
Start: 2019-05-08 | End: 2019-07-08

## 2019-05-08 RX ORDER — SODIUM CHLORIDE, SODIUM LACTATE, POTASSIUM CHLORIDE, CALCIUM CHLORIDE 600; 310; 30; 20 MG/100ML; MG/100ML; MG/100ML; MG/100ML
INJECTION, SOLUTION INTRAVENOUS CONTINUOUS
Status: DISCONTINUED | OUTPATIENT
Start: 2019-05-08 | End: 2019-05-08 | Stop reason: HOSPADM

## 2019-05-08 RX ORDER — GABAPENTIN 300 MG/1
300 CAPSULE ORAL ONCE
Status: COMPLETED | OUTPATIENT
Start: 2019-05-08 | End: 2019-05-08

## 2019-05-08 RX ORDER — BUPIVACAINE HYDROCHLORIDE 5 MG/ML
INJECTION, SOLUTION EPIDURAL; INTRACAUDAL PRN
Status: DISCONTINUED | OUTPATIENT
Start: 2019-05-08 | End: 2019-05-08

## 2019-05-08 RX ORDER — PHYSOSTIGMINE SALICYLATE 1 MG/ML
1.2 INJECTION INTRAVENOUS
Status: DISCONTINUED | OUTPATIENT
Start: 2019-05-08 | End: 2019-05-08 | Stop reason: HOSPADM

## 2019-05-08 RX ORDER — AMOXICILLIN 250 MG
1-2 CAPSULE ORAL 2 TIMES DAILY
Qty: 30 TABLET | Refills: 0 | Status: ON HOLD | OUTPATIENT
Start: 2019-05-08 | End: 2019-06-07

## 2019-05-08 RX ORDER — PAPAVERINE HYDROCHLORIDE 30 MG/ML
INJECTION INTRAMUSCULAR; INTRAVENOUS PRN
Status: DISCONTINUED | OUTPATIENT
Start: 2019-05-08 | End: 2019-05-08 | Stop reason: HOSPADM

## 2019-05-08 RX ORDER — ACETAMINOPHEN 325 MG/1
975 TABLET ORAL ONCE
Status: COMPLETED | OUTPATIENT
Start: 2019-05-08 | End: 2019-05-08

## 2019-05-08 RX ORDER — HYDRALAZINE HYDROCHLORIDE 20 MG/ML
2.5-5 INJECTION INTRAMUSCULAR; INTRAVENOUS EVERY 10 MIN PRN
Status: DISCONTINUED | OUTPATIENT
Start: 2019-05-08 | End: 2019-05-08 | Stop reason: HOSPADM

## 2019-05-08 RX ORDER — ONDANSETRON 2 MG/ML
4 INJECTION INTRAMUSCULAR; INTRAVENOUS EVERY 30 MIN PRN
Status: DISCONTINUED | OUTPATIENT
Start: 2019-05-08 | End: 2019-05-08 | Stop reason: HOSPADM

## 2019-05-08 RX ORDER — FLUMAZENIL 0.1 MG/ML
0.2 INJECTION, SOLUTION INTRAVENOUS
Status: DISCONTINUED | OUTPATIENT
Start: 2019-05-08 | End: 2019-05-08 | Stop reason: HOSPADM

## 2019-05-08 RX ADMIN — MIDAZOLAM HYDROCHLORIDE 1 MG: 1 INJECTION, SOLUTION INTRAMUSCULAR; INTRAVENOUS at 07:13

## 2019-05-08 RX ADMIN — FENTANYL CITRATE 100 MCG: 50 INJECTION, SOLUTION INTRAMUSCULAR; INTRAVENOUS at 08:04

## 2019-05-08 RX ADMIN — PHENYLEPHRINE HYDROCHLORIDE 100 MCG: 10 INJECTION, SOLUTION INTRAMUSCULAR; INTRAVENOUS; SUBCUTANEOUS at 08:26

## 2019-05-08 RX ADMIN — MIDAZOLAM HYDROCHLORIDE 1 MG: 1 INJECTION, SOLUTION INTRAMUSCULAR; INTRAVENOUS at 07:06

## 2019-05-08 RX ADMIN — ONDANSETRON 4 MG: 2 INJECTION INTRAMUSCULAR; INTRAVENOUS at 10:36

## 2019-05-08 RX ADMIN — GABAPENTIN 300 MG: 300 CAPSULE ORAL at 06:44

## 2019-05-08 RX ADMIN — ONDANSETRON 4 MG: 2 INJECTION INTRAMUSCULAR; INTRAVENOUS at 13:11

## 2019-05-08 RX ADMIN — FENTANYL CITRATE 25 MCG: 50 INJECTION INTRAMUSCULAR; INTRAVENOUS at 07:16

## 2019-05-08 RX ADMIN — PHENYLEPHRINE HYDROCHLORIDE 0.05 MCG/KG/MIN: 10 INJECTION, SOLUTION INTRAMUSCULAR; INTRAVENOUS; SUBCUTANEOUS at 08:42

## 2019-05-08 RX ADMIN — Medication 90 MG: at 08:06

## 2019-05-08 RX ADMIN — SODIUM CHLORIDE: 9 INJECTION, SOLUTION INTRAVENOUS at 07:57

## 2019-05-08 RX ADMIN — FENTANYL CITRATE 25 MCG: 50 INJECTION INTRAMUSCULAR; INTRAVENOUS at 07:11

## 2019-05-08 RX ADMIN — GENTAMICIN SULFATE 70 MG: 40 INJECTION, SOLUTION INTRAMUSCULAR; INTRAVENOUS at 08:16

## 2019-05-08 RX ADMIN — BUPIVACAINE HYDROCHLORIDE 30 ML: 5 INJECTION, SOLUTION EPIDURAL; INTRACAUDAL at 07:15

## 2019-05-08 RX ADMIN — FENTANYL CITRATE 50 MCG: 50 INJECTION INTRAMUSCULAR; INTRAVENOUS at 07:07

## 2019-05-08 RX ADMIN — PHENYLEPHRINE HYDROCHLORIDE 50 MCG: 10 INJECTION, SOLUTION INTRAMUSCULAR; INTRAVENOUS; SUBCUTANEOUS at 08:59

## 2019-05-08 RX ADMIN — ACETAMINOPHEN 975 MG: 325 TABLET, FILM COATED ORAL at 06:44

## 2019-05-08 RX ADMIN — PROPOFOL 25 MG: 10 INJECTION, EMULSION INTRAVENOUS at 08:05

## 2019-05-08 RX ADMIN — PROPOFOL 125 MG: 10 INJECTION, EMULSION INTRAVENOUS at 08:04

## 2019-05-08 RX ADMIN — PHENYLEPHRINE HYDROCHLORIDE 100 MCG: 10 INJECTION, SOLUTION INTRAMUSCULAR; INTRAVENOUS; SUBCUTANEOUS at 08:39

## 2019-05-08 RX ADMIN — IPRATROPIUM BROMIDE AND ALBUTEROL SULFATE 3 ML: .5; 3 SOLUTION RESPIRATORY (INHALATION) at 06:45

## 2019-05-08 RX ADMIN — VANCOMYCIN HYDROCHLORIDE 1000 MG: 1 INJECTION, SOLUTION INTRAVENOUS at 06:52

## 2019-05-08 ASSESSMENT — COPD QUESTIONNAIRES: COPD: 0

## 2019-05-08 ASSESSMENT — MIFFLIN-ST. JEOR: SCORE: 1507.41

## 2019-05-08 ASSESSMENT — LIFESTYLE VARIABLES: TOBACCO_USE: 0

## 2019-05-08 ASSESSMENT — ENCOUNTER SYMPTOMS: SEIZURES: 0

## 2019-05-08 NOTE — BRIEF OP NOTE
[facility] [place]    Brief Operative Note    Pre-operative diagnosis: End Stage Renal Disease, Post Heart Transplant  Post-operative diagnosis * No post-op diagnosis entered *  Procedure: Procedure(s):  Left Upper Extremity Arteriovenous Bovine Graft Creation  Surgeon: Surgeon(s) and Role:     * Calin Cheney MD - Primary     * Lacho Sosa MD - Resident - Assisting     * Ayan Laguna MD - Fellow - Assisting  Anesthesia: Combined General with Block   Estimated blood loss: Less than 10 ml  Drains: None  Specimens: * No specimens in log *  Findings:   Axillary vein to brachial artery bovine graft placed, flow 420 cm/s.  Complications: None.  Implants:    Implant Name Type Inv. Item Serial No.  Lot No. LRB No. Used   GRAFT ARTERY BOVINE CAROTID ARTEGRAFT [id number]  Bone/Tissue/Biologic GRAFT ARTERY BOVINE CAROTID ARTEGRAFT [id number]  [id number] ARTEGRAFT [id number] Left 1

## 2019-05-08 NOTE — ANESTHESIA CARE TRANSFER NOTE
Patient: Murray Nicholson    Procedure(s):  Left Upper Extremity Arteriovenous Bovine Graft Creation    Diagnosis: End Stage Renal Disease, Post Heart Transplant  Diagnosis Additional Information: No value filed.    Anesthesia Type:   No value filed.     Note:  Airway :Face Mask  Patient transferred to:PACU  Comments: Sleepy, awakens easily to verbal stimuli. Report to RN. Handoff Report: Identifed the Patient, Identified the Reponsible Provider, Reviewed the pertinent medical history, Discussed the surgical course, Reviewed Intra-OP anesthesia mangement and issues during anesthesia, Set expectations for post-procedure period and Allowed opportunity for questions and acknowledgement of understanding      Vitals: (Last set prior to Anesthesia Care Transfer)    CRNA VITALS  5/8/2019 1025 - 5/8/2019 1102      5/8/2019             EKG:  Sinus rhythm                Electronically Signed By: VERONICA Pinto CRNA  May 8, 2019  11:02 AM

## 2019-05-08 NOTE — ANESTHESIA PROCEDURE NOTES
Peripheral Nerve Block Procedure Note  Date/Time: 5/8/2019 7:15 AM    Staff:     Anesthesiologist:  Angelito Erwin MD    Resident/CRNA:  Magan Pierce MD    Block performed by resident/CRNA in the presence of a teaching physician    Location: Pre-op  Procedure Start/Stop TImes:      5/8/2019 7:05 AM     5/8/2019 7:20 AM    patient identified, IV checked, site marked, risks and benefits discussed, informed consent, monitors and equipment checked, pre-op evaluation, at physician/surgeon's request and post-op pain management      Correct Patient: Yes      Correct Position: Yes      Correct Site: Yes      Correct Procedure: Yes      Correct Laterality:  Yes    Site Marked:  Yes  Procedure details:     Procedure:  Supraclavicular    ASA:  3    Diagnosis:  ESRD    Laterality:  Left    Position:  Sitting    Sterile Prep: mask and sterile gloves      Local skin infiltration:  2% lidocaine (intercostobrachial nerve block)    amount (mL):  5    Needle:  Short bevel and insulated    Needle gauge:  21    Needle length (inches):  4    Ultrasound: Yes      Ultrasound used to identify targeted nerve, plexus, or vascular structure and placed a needle adjacent to it      Permanent Image entered into patiient's record      Abnormal pain on injection: No      Blood Aspirated: No      Paresthesias:  No    Bleeding at site: No      Test dose negative for signs of intravascular injection: Yes      Bolus via:  Needle    Infusion Method:  Single Shot    Complications:  None  Assessment/Narrative:     Injection made incrementally with aspirations every (mL):  5

## 2019-05-08 NOTE — DISCHARGE INSTRUCTIONS
[facility]  Same-Day Surgery   Adult Discharge Orders & Instructions     For 24 hours after surgery    1. Get plenty of rest.  A responsible adult must stay with you for at least 24 hours after you leave the hospital.   2. Do not drive or use heavy equipment.  If you have weakness or tingling, don't drive or use heavy equipment until this feeling goes away.  3. Do not drink alcohol.  4. Avoid strenuous or risky activities.  Ask for help when climbing stairs.   5. You may feel lightheaded.  IF so, sit for a few minutes before standing.  Have someone help you get up.   6. If you have nausea (feel sick to your stomach): Drink only clear liquids such as apple juice, ginger ale, broth or 7-Up.  Rest may also help.  Be sure to drink enough fluids.  Move to a regular diet as you feel able.  7. You may have a slight fever. Call the doctor if your fever is over 100 F (37.7 C) (taken under the tongue) or lasts longer than 24 hours.  8. You may have a dry mouth, a sore throat, muscle aches or trouble sleeping.  These should go away after 24 hours.  9. Do not make important or legal decisions.   Call your doctor for any of the followin.  Signs of infection (fever, growing tenderness at the surgery site, a large amount of drainage or bleeding, severe pain, foul-smelling drainage, redness, swelling).    2. It has been over 8 to 10 hours since surgery and you are still not able to urinate (pass water).    3.  Headache for over 24 hours.      To contact a doctor, call Dr Cheney's office at [phone] or:        [phone] and ask for the resident on call for Transplant (answered 24 hours a day)      Emergency Department:    [facility]: [phone]       (TTY for hearing impaired: [phone])               Tips for taking pain medications  To get the best pain relief possible , remember these points:      Take pain medications as directed, before pain becomes  severe      Pain medication can upset your stomach: taking it with food may help      Constipation is a common side effect of pain medication. Drink plenty of  Fluids      Eat foods high in fiber. Take a stool softener  if recommended by your doctor or  Pharmacist.        Do not drink alcohol, drive or operate machinery while taking pain medications.      Ask about other ways to control pain, such as with heat, ice or relaxation.

## 2019-05-08 NOTE — OR NURSING
Dr. Dean Ma, with anesthesia, notified patient has met discharge criteria and is ready for a post op signout. Dr. Ma will come see patient in pacu.

## 2019-05-08 NOTE — OP NOTE
Transplant Surgery  Operative Note    PREOP DIAGNOSIS: End Stage Renal Failure   POSTOP DIAGNOSIS: Same  OPERATION: Arteriovenous Graft construction, left arm. brachial artery to axillary vein.   FACULTY: Calin Cheney M.D.  FELLOW: Ayan Laguna    ASSISTANT: Lacho Sosa, resident.    ANESTHESIA: General and Scalene Block  EBL: 5 ml.  SPECIMEN: none  FLUIDS: 150 cc crystalloid  DRAIN: None  COMPLICATIONS: None.    INDICATION: The patient was referred for permanent hemodialysis access creation due to renal failure. The indications, benefits, and risks of permanent vascular access creation were discussed, questions answered and informed consent was provided.   FINDINGS:   Artery quality was: Normal, with diameter of 5 mm.  Anastomosis diameter: 4 mm.  Outflow vein quality was: Normal with diameter of 12 mm. Anastomosis length of 14 mm.   Blood flow was 420 ml/min as measured by Transonic flow probe.  PROCEDURE: The patient was positioned supine, and the left upper extremity was positioned, prepped and draped in the usual sterile fashion. An ultrasound was performed to assess the size, quality, and position of the artery and vein. The patient received preoperative IV antibiotics. An incision was made and extended through the subcutaneous tissues above the. The brachial artery and axillary vein were circumferentially dissected. Counter incisions were made as needed and a Marcie-Weck  was used to place a 6 mm Bovine  AVG in the subcutaneous space of the upper arm. The graft was distended with heparinzed saline and seen to have good lay.   The patient was heparinized. Proximal and distal control of the  brachial artery was obtained, an arteriotomy was made and widened with an aortic punch. The arterial end of the graft was trimmed and anastomosed end to side with the brachial artery using 6-0 Prolene.  The clamps were removed in sequence, the graft deaired then retrograde flushed with heparinized saline  then reclamped. The vein was controlled and venotomy was made. The graft was tailored and anastomosed with 6-0 Prolene.The graft was flushed retrograde then antegrade. The graft flow was excellent. The distal radial artery pulse was excellent and capillary refill excellent. Hemostasis was achieved. The wound was closed in layers with absorbable suture and dressed with Dermabond. Counts were correct. Faculty was present for the key portions of the procedure. The patient was then awakened and transferred to PACU in good condition.     I was present during the key portions of the procedure, and I was immediately available for the entire procedure.

## 2019-05-08 NOTE — ANESTHESIA POSTPROCEDURE EVALUATION
Anesthesia POST Procedure Evaluation    Patient: Murray Nicholson   MRN:     [id number] Gender:   male   Age:    64 year old :      1955        Preoperative Diagnosis: End Stage Renal Disease, Post Heart Transplant   Procedure(s):  Left Upper Extremity Arteriovenous Bovine Graft Creation   Postop Comments: No value filed.       Anesthesia Type:  General  No value filed.    Reportable Event: NO     PAIN:        Disposition:        Comments/Narrative:  Evaluation location: PACU    Mental status: Preoperative baseline. Awake and oriented, responding to questions appropriately  Airway: Natural airway (no supplemental O2)  Pain: Well controlled/None  Nausea: None  Vitals: Stable    Disposition: Appropriate to be discharged to home    ### Note may not be fully accurate (as some portions are automatically generated upon opening) ###             Last Anesthesia Record Vitals:  CRNA VITALS  2019 1025 - 2019 1125      2019             EKG:  Sinus rhythm          Last PACU Vitals:  Vitals Value Taken Time   /67 2019 12:15 PM   Temp 36.4  C (97.6  F) 2019 12:15 PM   Pulse 66 2019 12:15 PM   Resp 19 2019 12:16 PM   SpO2 100 % 2019 12:18 PM   Temp src     NIBP     Pulse     SpO2     Resp     Temp     Ht Rate     Temp 2     Vitals shown include unvalidated device data.      Electronically Signed By: Dean Ma MD, May 8, 2019, 12:57 PM

## 2019-05-08 NOTE — ANESTHESIA PREPROCEDURE EVALUATION
Anesthesia Pre-Procedure Evaluation    Patient: Murray Nicholson   MRN:     [id number] Gender:   male   Age:    64 year old :      1955        Preoperative Diagnosis: End Stage Renal Disease, Post Heart Transplant   Procedure(s):  Left Upper Extremity Arteriovenous Bovine Graft Creation     Past Medical History:   Diagnosis Date     (HFpEF) heart failure with preserved ejection fraction (H)      Allergic rhinitis, cause unspecified      Anemia of chronic kidney failure      AS (aortic stenosis)      Ascending aortic aneurysm (H)      Bicuspid aortic valve      CAD (coronary artery disease)      Chronic kidney disease, stage 5 (H)      Congestive heart failure, unspecified      Dialysis patient (H)     Tues-Thur-Sat     Dyslipidemia      Esophageal reflux      ESRD (end stage renal disease) (H)      Hearing problem      Heart replaced by transplant (H) 12/10/2018     Hypersomnia with sleep apnea, unspecified      Hypertension      Ileostomy status (H)      Immunosuppression (H)      Kidney problem      MGUS (monoclonal gammopathy of unknown significance)      Mitral regurgitation      SHEELA (obstructive sleep apnea)     No CPAP     Pneumonia      Systolic heart failure (H)      Type 2 diabetes mellitus (H)       Past Surgical History:   Procedure Laterality Date     CARDIAC SURGERY       COLONOSCOPY N/A 5/3/2018    Procedure: COLONOSCOPY;  colonoscopy ;  Surgeon: Ammon Castillo MD;  Location:  GI     CV HEART BIOPSY N/A 2019    Procedure: HBX;  Surgeon: Montrell Posada MD;  Location:  HEART CARDIAC CATH LAB     CV HEART BIOPSY N/A 3/22/2019    Procedure: HBX, RIJV ACCESS;  Surgeon: Jordan Fox MD;  Location: [facility] CARDIAC CATH LAB     ESOPHAGOSCOPY, GASTROSCOPY, DUODENOSCOPY (EGD), COMBINED N/A 2018    Procedure: COMBINED ENDOSCOPIC ULTRASOUND, ESOPHAGOSCOPY, GASTROSCOPY, DUODENOSCOPY (EGD), FINE NEEDLE ASPIRATE/BIOPSY;  Endoscopic Ultrasound with Fine Needle Aspiration ;  Surgeon:  Alon Don MD;  Location: [facility] OR     EXAM UNDER ANESTHESIA RECTUM N/A 8/12/2018    Procedure: EXAM UNDER ANESTHESIA RECTUM;  EXAM UNDER ANESTHESIA RECTUM ,COMBINED INCISION AND DRAINAGE OF RECTAL ABCESS ;  Surgeon: Rick Tran MD;  Location: [facility] OR     INCISION AND DRAINAGE RECTUM, COMBINED N/A 8/12/2018    Procedure: COMBINED INCISION AND DRAINAGE RECTUM;;  Surgeon: Rick Tran MD;  Location: [facility] OR     LAPAROSCOPIC ASSISTED COLOSTOMY TAKEDOWN N/A 12/11/2018    Procedure: Laparoscopic Assisted Colostomy Takedown, Laparoscopic Lysis of Adhesions;  Surgeon: Rick Tran MD;  Location: [facility] OR     LAPAROSCOPIC ASSISTED SIGMOID COLECTOMY N/A 8/14/2018    Procedure: LAPAROSCOPIC ASSISTED SIGMOID COLECTOMY;  Laparoscopic Hand Assisted Takedown of Splenic Flexure, Sigmoidectomy, Small Bowel Resection, Takedown of Small Bowel to Colon Fistula;  Surgeon: Rick Tran MD;  Location: [facility] OR     LAPAROSCOPIC HERNIORRHAPHY INGUINAL BILATERAL Bilateral 7/24/2015    Procedure: LAPAROSCOPIC HERNIORRHAPHY INGUINAL BILATERAL;  Surgeon: Bobby Mcconnell MD;  Location: [facility] OR     LAPAROSCOPIC INSERTION CATHETER PERITONEAL DIALYSIS N/A 6/22/2017    Procedure: LAPAROSCOPIC INSERTION CATHETER PERITONEAL DIALYSIS;  Laparoscopic Peritoneal Dialysis Catheter Placement - Anesthesia with block;  Surgeon: Esteban Arvizu MD;  Location: [facility] OR     PICC INSERTION Left 04/22/2018    5Fr - 49cm (3cm external), Basilic vein, low SVC     REMOVE CATHETER PERITONEAL Right 1/15/2018    Procedure: REMOVE CATHETER PERITONEAL;  Open Removal of Peritoneal Dialysis Catheter ;  Surgeon: Esteban Arvizu MD;  Location: [facility] OR     SIGMOIDOSCOPY FLEXIBLE N/A 11/21/2018    Procedure: Examination Under Anesthesia, Flexible Sigmoidoscopy and Polypectomy;  Surgeon: Rick Tran MD;  Location: [facility]     SIGMOIDOSCOPY FLEXIBLE N/A 12/11/2018    Procedure: Flexible Sigmoidoscopy;  Surgeon:  Rick Tran MD;  Location: [facility] OR     TAKEDOWN ILEOSTOMY N/A 3/27/2019    Procedure: Takedown Ileostomy;  Surgeon: Rick Tran MD;  Location: [facility]     TRANSPLANT HEART RECIPIENT N/A 6/14/2018    Procedure: TRANSPLANT HEART RECIPIENT;  Median Sternotomy, on-pump oxygenator, Heart Transplant;  Surgeon: Rony Caputo MD;  Location: [facility] OR          Anesthesia Evaluation     . Pt has had prior anesthetic. Type: General and MAC    No history of anesthetic complications          ROS/MED HX    ENT/Pulmonary:     (+)sleep apnea, allergic rhinitis, Past use doesn't use CPAP , . .   (-) tobacco use, asthma and COPD   Neurologic:  - neg neurologic ROS    (-) seizures, CVA and TIA   Cardiovascular: Comment: S/p heart txp 6/2018    (+) hypertension----. : . . . :. . Previous cardiac testing Echodate:12/2018results:date: results: date: results: date: results:         (-) CHF and pulmonary hypertension   METS/Exercise Tolerance: Comment: Elliptical  three times a week.  Does band exercises for upper body strength.  >4 METS   Hematologic:     (+) History of blood clots (Line related.  Completed anticoagulation treatment.) pt is not anticoagulated, History of Transfusion no previous transfusion reaction -      Musculoskeletal:  - neg musculoskeletal ROS       GI/Hepatic:     (+) GERD Asymptomatic on medication,      (-) liver disease   Renal/Genitourinary:     (+) chronic renal disease, type: ESRD, Pt requires dialysis, type: Hemodialysis, Pt has no history of transplant,       Endo:     (+) type I DM, Last HgA1c: 5.4 date: 3/27/19 Using insulin - not using insulin pump Normal glucose range: BG in morning 112-138 not previously admitted for DM/DKA .      Psychiatric:         Infectious Disease:  - neg infectious disease ROS       Malignancy:      - no malignancy   Other:    (+) No chance of pregnancy C-spine cleared: Yes, no H/O Chronic Pain,no other significant disability     "                    PHYSICAL EXAM:   Mental Status/Neuro:    Airway:   Mallampati: II  Mouth/Opening: Full  TM distance: > 6 cm  Neck ROM: Full   Respiratory: Auscultation: CTAB     Resp. Rate: Normal     Resp. Effort: Normal      CV: Rhythm: Regular  Heart: Normal Sounds   Comments:                    Lab Results   Component Value Date    WBC 3.1 (L) 05/08/2019    HGB 8.6 (L) 05/08/2019    HCT 28.7 (L) 05/08/2019     05/08/2019    CRP <2.9 03/22/2019    SED 33 (H) 09/17/2018     04/22/2019    POTASSIUM 4.0 05/08/2019    CHLORIDE 99 04/22/2019    CO2 23 04/22/2019    BUN 36 (H) 04/22/2019    CR 5.11 (H) 05/08/2019     (H) 05/08/2019    TRINI 9.7 04/22/2019    PHOS 3.8 04/22/2019    MAG 1.4 (L) 04/22/2019    ALBUMIN 3.4 02/19/2019    PROTTOTAL 7.2 02/19/2019    ALT 23 02/19/2019    AST 27 02/19/2019    ALKPHOS 350 (H) 02/19/2019    BILITOTAL 0.7 02/19/2019    AMYLASE 62 06/14/2018    PTT 33 05/08/2019    INR 1.07 05/08/2019    FIBR 407 08/12/2018    TSH 2.25 04/16/2018       Preop Vitals  BP Readings from Last 3 Encounters:   05/08/19 107/66   04/29/19 116/71   04/26/19 99/41    Pulse Readings from Last 3 Encounters:   05/08/19 66   04/29/19 70   04/26/19 86      Resp Readings from Last 3 Encounters:   05/08/19 16   04/26/19 16   04/08/19 16    SpO2 Readings from Last 3 Encounters:   05/08/19 100%   04/29/19 98%   04/26/19 99%      Temp Readings from Last 1 Encounters:   05/08/19 36.5  C (97.7  F) (Oral)    Ht Readings from Last 1 Encounters:   05/08/19 1.746 m (5' 8.75\")      Wt Readings from Last 1 Encounters:   05/08/19 73.1 kg (161 lb 2.5 oz)    Estimated body mass index is 23.97 kg/m  as calculated from the following:    Height as of this encounter: 1.746 m (5' 8.75\").    Weight as of this encounter: 73.1 kg (161 lb 2.5 oz).     LDA:  Peripheral IV 05/08/19 Right Hand (Active)   Site Assessment WDL 5/8/2019 11:37 AM   Line Status Saline locked 5/8/2019 11:37 AM   Phlebitis Scale 0-->no symptoms " 5/8/2019  7:00 AM   Number of days: 0       CVC Double Lumen 04/17/18 Right Subclavian (Active)   Site Assessment UTV 5/8/2019 11:37 AM   Lumen Soln/Vol REFERENCE 2.1/2.1 12/15/2018 11:46 AM   External Cath Length (cm) 4 cm 12/13/2018 10:45 AM   Dressing Intervention Gauze 5/8/2019  6:40 AM   Dressing Change Due 04/04/19 3/31/2019 11:48 PM   CVC Comment CDI 3/27/2019  9:00 PM   Lumen A - Color RED 3/27/2019  9:00 PM   Lumen A - Status saline locked 3/27/2019  9:00 PM   Lumen B - Color BLUE 3/27/2019  9:00 PM   Lumen B - Status saline locked 3/27/2019  9:00 PM   Extravasation? No 3/27/2019  9:00 PM   Number of days: 386       Hemodialysis Vascular Access Arteriovenous fistula Left Arm (Active)   Site Assessment WDL;Bruit present;Thrill present 5/8/2019 11:37 AM   Number of days: 0            Assessment:   ASA SCORE: 3       Documentation: H&P complete; Preop Testing complete; Consents complete   Proceeding: Proceed without further delay  Tobacco Use:  NO Active use of Tobacco/UNKNOWN Tobacco use status     Plan:   Anes. Type:  General                          PONV Management:  Adult Risk Factors:, Non-Smoker     CONSENT: Direct conversation   Plan and risks discussed with: Patient   Blood Products: Consented (ALL Blood Products)       Comments for Plan/Consent:  Airway exam:   MP: II  MO: >3 FB  TM: > 3 FB  ROM: Full  Impression: Feasible    No reported loose or chipped teeth    General with ETT. PIVx1-2. Standard ASA monitors. IV opioids, IV and PO adjuncts as appropriate, supraclavicular block. IV antiemetics. PACU postop.    General anesthetic risks discussed included sore throat, voice hoarseness, injury to vocal cords, throat, mouth, teeth, tongue, and lips from intubation; nausea/vomiting; cardiac arrest, respiratory complications, MI, stroke, blood clots, death.     Transfusion risks discussed include infection, and complications involving the heart and lungs (transfusion reaction).    Presented opportunity to  answer patient/family/POA/guardian questions. Questions addressed.      ###Note may not be generated accurately due to auto-population of certain parts of the note upon opening for editing###                           Dean Ma MD

## 2019-05-08 NOTE — OR NURSING
Patient refused a doyle catheter to be placed intra-op.  and CRNA made aware. CRNA to have patient pee before coming to OR 11.

## 2019-05-13 DIAGNOSIS — Z94.1 HEART REPLACED BY TRANSPLANT (H): Primary | ICD-10-CM

## 2019-05-15 DIAGNOSIS — Z94.1 HEART REPLACED BY TRANSPLANT (H): ICD-10-CM

## 2019-05-15 DIAGNOSIS — Z12.5 SCREENING PSA (PROSTATE SPECIFIC ANTIGEN): ICD-10-CM

## 2019-05-15 DIAGNOSIS — Z94.1 HEART TRANSPLANT RECIPIENT (H): ICD-10-CM

## 2019-05-15 LAB
ANION GAP SERPL CALCULATED.3IONS-SCNC: 10 MMOL/L (ref 3–14)
BASOPHILS # BLD AUTO: 0 10E9/L (ref 0–0.2)
BASOPHILS NFR BLD AUTO: 0.7 %
BUN SERPL-MCNC: 26 MG/DL (ref 7–30)
CALCIUM SERPL-MCNC: 9.2 MG/DL (ref 8.5–10.1)
CHLORIDE SERPL-SCNC: 99 MMOL/L (ref 94–109)
CO2 SERPL-SCNC: 25 MMOL/L (ref 20–32)
CREAT SERPL-MCNC: 5.29 MG/DL (ref 0.66–1.25)
DIFFERENTIAL METHOD BLD: ABNORMAL
EOSINOPHIL # BLD AUTO: 0.1 10E9/L (ref 0–0.7)
EOSINOPHIL NFR BLD AUTO: 5 %
ERYTHROCYTE [DISTWIDTH] IN BLOOD BY AUTOMATED COUNT: 15.8 % (ref 10–15)
GFR SERPL CREATININE-BSD FRML MDRD: 11 ML/MIN/{1.73_M2}
GLUCOSE SERPL-MCNC: 119 MG/DL (ref 70–99)
HCT VFR BLD AUTO: 27.5 % (ref 40–53)
HGB BLD-MCNC: 8.5 G/DL (ref 13.3–17.7)
IMM GRANULOCYTES # BLD: 0 10E9/L (ref 0–0.4)
IMM GRANULOCYTES NFR BLD: 0.7 %
LYMPHOCYTES # BLD AUTO: 0.9 10E9/L (ref 0.8–5.3)
LYMPHOCYTES NFR BLD AUTO: 32.7 %
MCH RBC QN AUTO: 29.4 PG (ref 26.5–33)
MCHC RBC AUTO-ENTMCNC: 30.9 G/DL (ref 31.5–36.5)
MCV RBC AUTO: 95 FL (ref 78–100)
MONOCYTES # BLD AUTO: 0.4 10E9/L (ref 0–1.3)
MONOCYTES NFR BLD AUTO: 14.4 %
NEUTROPHILS # BLD AUTO: 1.3 10E9/L (ref 1.6–8.3)
NEUTROPHILS NFR BLD AUTO: 46.5 %
NRBC # BLD AUTO: 0 10*3/UL
NRBC BLD AUTO-RTO: 0 /100
PLATELET # BLD AUTO: 232 10E9/L (ref 150–450)
POTASSIUM SERPL-SCNC: 3.9 MMOL/L (ref 3.4–5.3)
PSA SERPL-ACNC: 1.31 UG/L (ref 0–4)
RBC # BLD AUTO: 2.89 10E12/L (ref 4.4–5.9)
SODIUM SERPL-SCNC: 134 MMOL/L (ref 133–144)
TACROLIMUS BLD-MCNC: 6.3 UG/L (ref 5–15)
TME LAST DOSE: 2015 H
WBC # BLD AUTO: 2.8 10E9/L (ref 4–11)

## 2019-05-15 PROCEDURE — 80197 ASSAY OF TACROLIMUS: CPT | Performed by: INTERNAL MEDICINE

## 2019-05-16 DIAGNOSIS — Z94.1 HEART REPLACED BY TRANSPLANT (H): Primary | ICD-10-CM

## 2019-05-16 NOTE — RESULT ENCOUNTER NOTE
Reviewed labs with pt; FK within goal 6-8. No dose change.  WBC 2.8; ANC 1.3 CMV added  Pt denies n/v/d. C/o some light headedness in AM -BPs within goal.  CMV added; recheck labs 6/7/19 for annual

## 2019-05-22 ENCOUNTER — OFFICE VISIT (OUTPATIENT)
Dept: TRANSPLANT | Facility: CLINIC | Age: 64
End: 2019-05-22
Attending: CLINICAL NURSE SPECIALIST
Payer: MEDICARE

## 2019-05-22 VITALS
SYSTOLIC BLOOD PRESSURE: 118 MMHG | HEART RATE: 91 BPM | WEIGHT: 165.8 LBS | BODY MASS INDEX: 24.66 KG/M2 | OXYGEN SATURATION: 100 % | DIASTOLIC BLOOD PRESSURE: 69 MMHG

## 2019-05-22 DIAGNOSIS — Z48.89 ENCOUNTER FOR POST SURGICAL WOUND CHECK: ICD-10-CM

## 2019-05-22 DIAGNOSIS — N18.6 ESRD ON DIALYSIS (H): Primary | ICD-10-CM

## 2019-05-22 DIAGNOSIS — Z09 FOLLOW-UP EXAMINATION AFTER VASCULAR SURGERY: ICD-10-CM

## 2019-05-22 DIAGNOSIS — Z99.2 ESRD ON DIALYSIS (H): Primary | ICD-10-CM

## 2019-05-22 NOTE — LETTER
5/22/2019     RE: Murray Nicholson  [address]  [place] [address]     Dear Colleague,    Thank you for referring your patient, Murray Nicholson, to the [facility] SOLID ORGAN TRANSPLANT at [facility]. Please see a copy of my visit note below.    Dialysis Access Service   Progress Note    S:  Mr. Nicholson is being seen today for surgical followup of his dialysis access.  He reports no issues with the wound, and  no steal syndrome of the distal extremity. C/O swelling in left arm after surgery, he reports left upper arm swelling subsiding but not left forearm. C/O mild tenderness at AC fossa incision area when compress site and mild numbness in left forearm with light pressure. Denies pain in left arm, tingling/numbness and a change of color/temperature in left hand and fingers. S/P  Arteriovenous Graft construction, left  upper arm. brachial artery to axillary vein on 5/8/2019.     O:  Pulse:  [91] 91  BP: (118)/(69) 118/69  SpO2:  [100 %] 100 %  GENERAL: no distress, cooperative  Circulation:   Radial pulse 3+  Ulnar pulse  3+   Capillary refill:  capillary refill < 2 sec    Sensory exam:   arm: Normal   []           Abnormal   [x]          Comment: mild numbness in left forearm with light pressure.   hand: Normal   [x]           Abnormal   []          Comment:   Motor exam:   arm: Normal   [x]           Abnormal   []          Comment:    hand: Normal   [x]           Abnormal   []          Comment:    Access: L upper extremity wound(s) healed, non-tender. Mild venous hypertension in left forearm, ++ thrill and bruit via hand held doppler present. Mild to moderate edema in left forearm without apparent infection. A small fluid collection area at AC fossa incision site, no redness or drainage noted    Assessment & Plan: Mr. Nicholson's dialysis access has matured well at this time point.    1. Elevate left arm with support as tolerated  2. Wrap left forearm with ACE bandage  as needed  3. Check thrill from LUE AV graft twice a day  4. Continue no blood draw and BP from left arm  5. Follow up with Dr. Cheney in 3 weeks    We would like to see the patient back in the clinic in 3 weeks time to assess progress. The patient was counselled to contact our nurse coordinator, VERONICA Daniel CNS (Sum) at [phone] with any questions or concerns.  Thank you for the opportunity to participate in Mr. Nicholson's care.    TT: 15 min  CT: 15 min    SARAH Renteria (Sum)  Dialysis Vascular Access/SOT Clinical Nurse Specialist    Solid Organ Transplant Service - [facility]   Phone # [phone]  Pager # [phone]

## 2019-05-22 NOTE — PROGRESS NOTES
Dialysis Access Service   Progress Note    S:  Mr. Nicholson is being seen today for surgical followup of his dialysis access.  He reports no issues with the wound, and  no steal syndrome of the distal extremity. C/O swelling in left arm after surgery, he reports left upper arm swelling subsiding but not left forearm. C/O mild tenderness at AC fossa incision area when compress site and mild numbness in left forearm with light pressure. Denies pain in left arm, tingling/numbness and a change of color/temperature in left hand and fingers. S/P  Arteriovenous Graft construction, left upper arm. brachial artery to axillary vein on 5/8/2019.     O:  Pulse:  [91] 91  BP: (118)/(69) 118/69  SpO2:  [100 %] 100 %  GENERAL: no distress, cooperative  Circulation:   Radial pulse 3+  Ulnar pulse  3+   Capillary refill:  capillary refill < 2 sec    Sensory exam:   arm: Normal   []           Abnormal   [x]          Comment: mild numbness in left forearm with light pressure.   hand: Normal   [x]           Abnormal   []          Comment:   Motor exam:   arm: Normal   [x]           Abnormal   []          Comment:    hand: Normal   [x]           Abnormal   []          Comment:    Access: L upper extremity wound(s) healed, non-tender. Mild venous hypertension in left forearm, ++ thrill and bruit via hand held doppler present. Mild to moderate edema in left forearm without apparent infection. A small fluid collection area at AC fossa incision site, no redness or drainage noted    Assessment & Plan: Mr. Nicholson's dialysis access has matured well at this time point.    1. Elevate left arm with support as tolerated  2. Wrap left forearm with ACE bandage as needed  3. Check thrill from LUE AV graft twice a day  4. Continue no blood draw and BP from left arm  5. Follow up with Dr. Cheney in 3 weeks    We would like to see the patient back in the clinic in 3 weeks time to assess progress. The patient was counselled to contact our nurse  coordinator, Torsten Velázquez) VERONICA Levine CNS at [phone] with any questions or concerns.  Thank you for the opportunity to participate in Mr. Nicholson's care.    TT: 15 min  CT: 15 min    Torsten Velázquez) SARAH Dhillon  Dialysis Vascular Access/SOT Clinical Nurse Specialist    Solid Organ Transplant Service - [facility]   Phone # [phone]  Pager # [phone]

## 2019-05-22 NOTE — LETTER
5/22/2019       RE: Murray Nicholson  [address]  [place] [address]     Dear Colleague,    Thank you for referring your patient, Murray Nicholson, to the [facility] SOLID ORGAN TRANSPLANT at [facility]. Please see a copy of my visit note below.    Dialysis Access Service   Progress Note    S:  Mr. Nicholson is being seen today for surgical followup of his dialysis access.  He reports no issues with the wound, and  no steal syndrome of the distal extremity. C/O swelling in left arm after surgery, he reports left upper arm swelling subsiding but not left forearm. C/O mild tenderness at AC fossa incision area when compress site and mild numbness in left forearm with light pressure. Denies pain in left arm, tingling/numbness and a change of color/temperature in left hand and fingers. S/P  Arteriovenous Graft construction, left  upper arm. brachial artery to axillary vein on 5/8/2019.     O:  Pulse:  [91] 91  BP: (118)/(69) 118/69  SpO2:  [100 %] 100 %  GENERAL: no distress, cooperative  Circulation:   Radial pulse 3+  Ulnar pulse  3+   Capillary refill:  capillary refill < 2 sec    Sensory exam:   arm: Normal   []           Abnormal   [x]          Comment: mild numbness in left forearm with light pressure.   hand: Normal   [x]           Abnormal   []          Comment:   Motor exam:   arm: Normal   [x]           Abnormal   []          Comment:    hand: Normal   [x]           Abnormal   []          Comment:    Access: L upper extremity wound(s) healed, non-tender. Mild venous hypertension in left forearm, ++ thrill and bruit via hand held doppler present. Mild to moderate edema in left forearm without apparent infection. A small fluid collection area at AC fossa incision site, no redness or drainage noted    Assessment & Plan: Mr. Nicholson's dialysis access has matured well at this time point.    1. Elevate left arm with support as tolerated  2. Wrap left forearm with ACE bandage  as needed  3. Check thrill from LUE AV graft twice a day  4. Continue no blood draw and BP from left arm  5. Follow up with Dr. Cheney in 3 weeks    We would like to see the patient back in the clinic in 3 weeks time to assess progress. The patient was counselled to contact our nurse coordinator, VERONICA Daniel CNS (Sum) at [phone] with any questions or concerns.  Thank you for the opportunity to participate in Mr. Nicholson's care.    TT: 15 min  CT: 15 min    SARAH Renteria (Sum)  Dialysis Vascular Access/SOT Clinical Nurse Specialist    Solid Organ Transplant Service - [facility], later [facility]   Phone # [phone]  Pager # [phone]      Again, thank you for allowing me to participate in the care of your patient.      Sincerely,    VERONICA Crain

## 2019-06-03 ENCOUNTER — TELEPHONE (OUTPATIENT)
Dept: TRANSPLANT | Facility: CLINIC | Age: 64
End: 2019-06-03

## 2019-06-03 ENCOUNTER — PRE VISIT (OUTPATIENT)
Dept: TRANSPLANT | Facility: CLINIC | Age: 64
End: 2019-06-03

## 2019-06-03 DIAGNOSIS — Z12.5 PROSTATE CANCER SCREENING: ICD-10-CM

## 2019-06-03 DIAGNOSIS — E11.9 DIABETES MELLITUS (H): ICD-10-CM

## 2019-06-03 DIAGNOSIS — Z11.59 ENCOUNTER FOR SCREENING FOR OTHER VIRAL DISEASES: ICD-10-CM

## 2019-06-03 DIAGNOSIS — Z13.6 ENCOUNTER FOR LIPID SCREENING FOR CARDIOVASCULAR DISEASE: ICD-10-CM

## 2019-06-03 DIAGNOSIS — Z79.899 ENCOUNTER FOR LONG-TERM (CURRENT) USE OF MEDICATIONS: ICD-10-CM

## 2019-06-03 DIAGNOSIS — Z94.1 HEART REPLACED BY TRANSPLANT (H): Primary | ICD-10-CM

## 2019-06-03 DIAGNOSIS — Z13.220 ENCOUNTER FOR LIPID SCREENING FOR CARDIOVASCULAR DISEASE: ICD-10-CM

## 2019-06-03 RX ORDER — POTASSIUM CHLORIDE 1500 MG/1
20 TABLET, EXTENDED RELEASE ORAL
Status: CANCELLED | OUTPATIENT
Start: 2019-06-03

## 2019-06-03 RX ORDER — POTASSIUM CHLORIDE 1500 MG/1
40 TABLET, EXTENDED RELEASE ORAL
Status: CANCELLED | OUTPATIENT
Start: 2019-06-03

## 2019-06-03 RX ORDER — SODIUM CHLORIDE 9 MG/ML
INJECTION, SOLUTION INTRAVENOUS CONTINUOUS
Status: CANCELLED | OUTPATIENT
Start: 2019-06-03

## 2019-06-03 NOTE — TELEPHONE ENCOUNTER
Called pt to review appts for Friday and Monday. Confirmed fasting labs with 12-hour Fk. Pt states he has a  for Friday post angiogram.  Enc to call with questions.

## 2019-06-07 ENCOUNTER — TELEPHONE (OUTPATIENT)
Dept: TRANSPLANT | Facility: CLINIC | Age: 64
End: 2019-06-07

## 2019-06-07 ENCOUNTER — APPOINTMENT (OUTPATIENT)
Dept: LAB | Facility: CLINIC | Age: 64
End: 2019-06-07
Attending: INTERNAL MEDICINE
Payer: MEDICARE

## 2019-06-07 ENCOUNTER — RESULTS ONLY (OUTPATIENT)
Dept: OTHER | Facility: CLINIC | Age: 64
End: 2019-06-07

## 2019-06-07 ENCOUNTER — HOSPITAL ENCOUNTER (OUTPATIENT)
Facility: CLINIC | Age: 64
Discharge: HOME | End: 2019-06-07
Attending: INTERNAL MEDICINE | Admitting: INTERNAL MEDICINE
Payer: MEDICARE

## 2019-06-07 ENCOUNTER — APPOINTMENT (OUTPATIENT)
Dept: MEDSURG UNIT | Facility: CLINIC | Age: 64
End: 2019-06-07
Attending: INTERNAL MEDICINE
Payer: MEDICARE

## 2019-06-07 VITALS
SYSTOLIC BLOOD PRESSURE: 128 MMHG | RESPIRATION RATE: 14 BRPM | HEIGHT: 68 IN | OXYGEN SATURATION: 100 % | BODY MASS INDEX: 24.55 KG/M2 | DIASTOLIC BLOOD PRESSURE: 68 MMHG | HEART RATE: 78 BPM | TEMPERATURE: 98 F | WEIGHT: 162 LBS

## 2019-06-07 DIAGNOSIS — N18.5 CKD (CHRONIC KIDNEY DISEASE) STAGE 5, GFR LESS THAN 15 ML/MIN (H): ICD-10-CM

## 2019-06-07 DIAGNOSIS — Z13.6 ENCOUNTER FOR LIPID SCREENING FOR CARDIOVASCULAR DISEASE: ICD-10-CM

## 2019-06-07 DIAGNOSIS — Z94.1 HEART REPLACED BY TRANSPLANT (H): ICD-10-CM

## 2019-06-07 DIAGNOSIS — Z11.59 ENCOUNTER FOR SCREENING FOR OTHER VIRAL DISEASES: ICD-10-CM

## 2019-06-07 DIAGNOSIS — E11.9 DIABETES MELLITUS (H): ICD-10-CM

## 2019-06-07 DIAGNOSIS — Z12.5 PROSTATE CANCER SCREENING: ICD-10-CM

## 2019-06-07 DIAGNOSIS — Z94.1 HEART REPLACED BY TRANSPLANT (H): Primary | ICD-10-CM

## 2019-06-07 DIAGNOSIS — D64.9 HEMOGLOBIN LOW: Primary | ICD-10-CM

## 2019-06-07 DIAGNOSIS — Z13.220 ENCOUNTER FOR LIPID SCREENING FOR CARDIOVASCULAR DISEASE: ICD-10-CM

## 2019-06-07 DIAGNOSIS — Z79.899 ENCOUNTER FOR LONG-TERM (CURRENT) USE OF MEDICATIONS: ICD-10-CM

## 2019-06-07 LAB
ABO + RH BLD: NORMAL
ABO + RH BLD: NORMAL
ALBUMIN SERPL-MCNC: 3.4 G/DL (ref 3.4–5)
ALP SERPL-CCNC: 278 U/L (ref 40–150)
ALT SERPL W P-5'-P-CCNC: 19 U/L (ref 0–70)
ANION GAP SERPL CALCULATED.3IONS-SCNC: 8 MMOL/L (ref 3–14)
AST SERPL W P-5'-P-CCNC: 14 U/L (ref 0–45)
BILIRUB SERPL-MCNC: 1.1 MG/DL (ref 0.2–1.3)
BLD GP AB SCN SERPL QL: NORMAL
BLD PROD TYP BPU: NORMAL
BLD PROD TYP BPU: NORMAL
BLD UNIT ID BPU: 0
BLOOD BANK CMNT PATIENT-IMP: NORMAL
BLOOD PRODUCT CODE: NORMAL
BPU ID: NORMAL
BUN SERPL-MCNC: 27 MG/DL (ref 7–30)
CALCIUM SERPL-MCNC: 9.1 MG/DL (ref 8.5–10.1)
CHLORIDE SERPL-SCNC: 99 MMOL/L (ref 94–109)
CHOLEST SERPL-MCNC: 110 MG/DL
CK SERPL-CCNC: 156 U/L (ref 30–300)
CO2 SERPL-SCNC: 27 MMOL/L (ref 20–32)
CREAT SERPL-MCNC: 5.16 MG/DL (ref 0.66–1.25)
ERYTHROCYTE [DISTWIDTH] IN BLOOD BY AUTOMATED COUNT: 17.7 % (ref 10–15)
GFR SERPL CREATININE-BSD FRML MDRD: 11 ML/MIN/{1.73_M2}
GLUCOSE SERPL-MCNC: 107 MG/DL (ref 70–99)
HBA1C MFR BLD: 5.6 % (ref 0–5.6)
HBV CORE AB SERPL QL IA: NONREACTIVE
HBV SURFACE AG SERPL QL IA: NONREACTIVE
HCT VFR BLD AUTO: 22.6 % (ref 40–53)
HCV AB SERPL QL IA: NONREACTIVE
HDLC SERPL-MCNC: 51 MG/DL
HGB BLD-MCNC: 6.4 G/DL (ref 13.3–17.7)
HGB BLD-MCNC: 6.6 G/DL (ref 13.3–17.7)
HGB BLD-MCNC: 7.3 G/DL (ref 13.3–17.7)
HIV 1+2 AB+HIV1 P24 AG SERPL QL IA: NONREACTIVE
LDLC SERPL CALC-MCNC: 31 MG/DL
MAGNESIUM SERPL-MCNC: 1.3 MG/DL (ref 1.6–2.3)
MCH RBC QN AUTO: 29.7 PG (ref 26.5–33)
MCHC RBC AUTO-ENTMCNC: 29.2 G/DL (ref 31.5–36.5)
MCV RBC AUTO: 102 FL (ref 78–100)
NONHDLC SERPL-MCNC: 59 MG/DL
NUM BPU REQUESTED: 1
PHOSPHATE SERPL-MCNC: 3 MG/DL (ref 2.5–4.5)
PLATELET # BLD AUTO: 221 10E9/L (ref 150–450)
POTASSIUM SERPL-SCNC: 4.2 MMOL/L (ref 3.4–5.3)
PROT SERPL-MCNC: 7 G/DL (ref 6.8–8.8)
PSA SERPL-ACNC: 1.4 UG/L (ref 0–4)
RBC # BLD AUTO: 2.22 10E12/L (ref 4.4–5.9)
SODIUM SERPL-SCNC: 135 MMOL/L (ref 133–144)
SPECIMEN EXP DATE BLD: NORMAL
TACROLIMUS BLD-MCNC: 4.9 UG/L (ref 5–15)
TME LAST DOSE: ABNORMAL H
TRANSFUSION STATUS PATIENT QL: NORMAL
TRANSFUSION STATUS PATIENT QL: NORMAL
TRIGL SERPL-MCNC: 139 MG/DL
WBC # BLD AUTO: 2.3 10E9/L (ref 4–11)

## 2019-06-07 PROCEDURE — 87389 HIV-1 AG W/HIV-1&-2 AB AG IA: CPT | Performed by: INTERNAL MEDICINE

## 2019-06-07 PROCEDURE — 86833 HLA CLASS II HIGH DEFIN QUAL: CPT | Performed by: INTERNAL MEDICINE

## 2019-06-07 PROCEDURE — 25000132 ZZH RX MED GY IP 250 OP 250 PS 637: Mod: GY | Performed by: STUDENT IN AN ORGANIZED HEALTH CARE EDUCATION/TRAINING PROGRAM

## 2019-06-07 PROCEDURE — 86901 BLOOD TYPING SEROLOGIC RH(D): CPT | Performed by: STUDENT IN AN ORGANIZED HEALTH CARE EDUCATION/TRAINING PROGRAM

## 2019-06-07 PROCEDURE — A9270 NON-COVERED ITEM OR SERVICE: HCPCS | Mod: GY | Performed by: INTERNAL MEDICINE

## 2019-06-07 PROCEDURE — 25000132 ZZH RX MED GY IP 250 OP 250 PS 637: Mod: GY | Performed by: INTERNAL MEDICINE

## 2019-06-07 PROCEDURE — 87799 DETECT AGENT NOS DNA QUANT: CPT | Performed by: INTERNAL MEDICINE

## 2019-06-07 PROCEDURE — P9016 RBC LEUKOCYTES REDUCED: HCPCS | Performed by: STUDENT IN AN ORGANIZED HEALTH CARE EDUCATION/TRAINING PROGRAM

## 2019-06-07 PROCEDURE — 85018 HEMOGLOBIN: CPT | Mod: 91 | Performed by: PHYSICIAN ASSISTANT

## 2019-06-07 PROCEDURE — 40000065 ZZH STATISTIC EKG NON-CHARGEABLE

## 2019-06-07 PROCEDURE — 80053 COMPREHEN METABOLIC PANEL: CPT | Performed by: INTERNAL MEDICINE

## 2019-06-07 PROCEDURE — 86704 HEP B CORE ANTIBODY TOTAL: CPT | Performed by: INTERNAL MEDICINE

## 2019-06-07 PROCEDURE — 86923 COMPATIBILITY TEST ELECTRIC: CPT | Performed by: STUDENT IN AN ORGANIZED HEALTH CARE EDUCATION/TRAINING PROGRAM

## 2019-06-07 PROCEDURE — 86352 CELL FUNCTION ASSAY W/STIM: CPT | Performed by: INTERNAL MEDICINE

## 2019-06-07 PROCEDURE — A9270 NON-COVERED ITEM OR SERVICE: HCPCS | Mod: GY | Performed by: STUDENT IN AN ORGANIZED HEALTH CARE EDUCATION/TRAINING PROGRAM

## 2019-06-07 PROCEDURE — 86850 RBC ANTIBODY SCREEN: CPT | Performed by: STUDENT IN AN ORGANIZED HEALTH CARE EDUCATION/TRAINING PROGRAM

## 2019-06-07 PROCEDURE — 36430 TRANSFUSION BLD/BLD COMPNT: CPT

## 2019-06-07 PROCEDURE — 93010 ELECTROCARDIOGRAM REPORT: CPT | Performed by: INTERNAL MEDICINE

## 2019-06-07 PROCEDURE — 80061 LIPID PANEL: CPT | Performed by: INTERNAL MEDICINE

## 2019-06-07 PROCEDURE — 80197 ASSAY OF TACROLIMUS: CPT | Performed by: INTERNAL MEDICINE

## 2019-06-07 PROCEDURE — 36415 COLL VENOUS BLD VENIPUNCTURE: CPT | Performed by: INTERNAL MEDICINE

## 2019-06-07 PROCEDURE — 36415 COLL VENOUS BLD VENIPUNCTURE: CPT | Performed by: PHYSICIAN ASSISTANT

## 2019-06-07 PROCEDURE — G0103 PSA SCREENING: HCPCS | Performed by: INTERNAL MEDICINE

## 2019-06-07 PROCEDURE — 84100 ASSAY OF PHOSPHORUS: CPT | Performed by: INTERNAL MEDICINE

## 2019-06-07 PROCEDURE — 25800030 ZZH RX IP 258 OP 636: Performed by: INTERNAL MEDICINE

## 2019-06-07 PROCEDURE — G0472 HEP C SCREEN HIGH RISK/OTHER: HCPCS | Performed by: INTERNAL MEDICINE

## 2019-06-07 PROCEDURE — 83735 ASSAY OF MAGNESIUM: CPT | Performed by: INTERNAL MEDICINE

## 2019-06-07 PROCEDURE — 85027 COMPLETE CBC AUTOMATED: CPT | Performed by: INTERNAL MEDICINE

## 2019-06-07 PROCEDURE — 82550 ASSAY OF CK (CPK): CPT | Performed by: INTERNAL MEDICINE

## 2019-06-07 PROCEDURE — 86832 HLA CLASS I HIGH DEFIN QUAL: CPT | Performed by: INTERNAL MEDICINE

## 2019-06-07 PROCEDURE — 86900 BLOOD TYPING SEROLOGIC ABO: CPT | Performed by: STUDENT IN AN ORGANIZED HEALTH CARE EDUCATION/TRAINING PROGRAM

## 2019-06-07 PROCEDURE — 83036 HEMOGLOBIN GLYCOSYLATED A1C: CPT | Performed by: INTERNAL MEDICINE

## 2019-06-07 PROCEDURE — 40000172 ZZH STATISTIC PROCEDURE PREP ONLY

## 2019-06-07 PROCEDURE — G0499 HEPB SCREEN HIGH RISK INDIV: HCPCS | Performed by: INTERNAL MEDICINE

## 2019-06-07 RX ORDER — ARGATROBAN 1 MG/ML
350 INJECTION, SOLUTION INTRAVENOUS
Status: DISCONTINUED | OUTPATIENT
Start: 2019-06-07 | End: 2019-06-07

## 2019-06-07 RX ORDER — NITROGLYCERIN 20 MG/100ML
.07-2 INJECTION INTRAVENOUS CONTINUOUS PRN
Status: DISCONTINUED | OUTPATIENT
Start: 2019-06-07 | End: 2019-06-07

## 2019-06-07 RX ORDER — TIROFIBAN HYDROCHLORIDE 50 UG/ML
0.07 INJECTION INTRAVENOUS CONTINUOUS PRN
Status: DISCONTINUED | OUTPATIENT
Start: 2019-06-07 | End: 2019-06-07

## 2019-06-07 RX ORDER — NOREPINEPHRINE BITARTRATE 0.06 MG/ML
.03-.4 INJECTION, SOLUTION INTRAVENOUS CONTINUOUS PRN
Status: DISCONTINUED | OUTPATIENT
Start: 2019-06-07 | End: 2019-06-07

## 2019-06-07 RX ORDER — ACETAMINOPHEN 325 MG/1
650 TABLET ORAL EVERY 6 HOURS PRN
Status: DISCONTINUED | OUTPATIENT
Start: 2019-06-07 | End: 2019-06-07 | Stop reason: HOSPADM

## 2019-06-07 RX ORDER — SODIUM CHLORIDE 9 MG/ML
INJECTION, SOLUTION INTRAVENOUS CONTINUOUS
Status: DISCONTINUED | OUTPATIENT
Start: 2019-06-07 | End: 2019-06-07

## 2019-06-07 RX ORDER — DOBUTAMINE HYDROCHLORIDE 200 MG/100ML
2-20 INJECTION INTRAVENOUS CONTINUOUS PRN
Status: DISCONTINUED | OUTPATIENT
Start: 2019-06-07 | End: 2019-06-07

## 2019-06-07 RX ORDER — POTASSIUM CHLORIDE 750 MG/1
40 TABLET, EXTENDED RELEASE ORAL
Status: DISCONTINUED | OUTPATIENT
Start: 2019-06-07 | End: 2019-06-07

## 2019-06-07 RX ORDER — AMOXICILLIN 250 MG
1-2 CAPSULE ORAL 2 TIMES DAILY
Qty: 30 TABLET | Refills: 0 | Status: SHIPPED | OUTPATIENT
Start: 2019-06-07 | End: 2019-06-11

## 2019-06-07 RX ORDER — ARGATROBAN 1 MG/ML
150 INJECTION, SOLUTION INTRAVENOUS
Status: DISCONTINUED | OUTPATIENT
Start: 2019-06-07 | End: 2019-06-07

## 2019-06-07 RX ORDER — TRAMADOL HYDROCHLORIDE 50 MG/1
25-50 TABLET ORAL EVERY 6 HOURS PRN
Qty: 10 TABLET | Refills: 0 | Status: SHIPPED | OUTPATIENT
Start: 2019-06-07 | End: 2019-08-21

## 2019-06-07 RX ORDER — DOPAMINE HYDROCHLORIDE 160 MG/100ML
2-20 INJECTION, SOLUTION INTRAVENOUS CONTINUOUS PRN
Status: DISCONTINUED | OUTPATIENT
Start: 2019-06-07 | End: 2019-06-07

## 2019-06-07 RX ORDER — POTASSIUM CHLORIDE 750 MG/1
20 TABLET, EXTENDED RELEASE ORAL
Status: DISCONTINUED | OUTPATIENT
Start: 2019-06-07 | End: 2019-06-07

## 2019-06-07 RX ADMIN — ACETAMINOPHEN 650 MG: 325 TABLET, FILM COATED ORAL at 15:26

## 2019-06-07 RX ADMIN — ASPIRIN 81 MG CHEWABLE TABLET 243 MG: 81 TABLET CHEWABLE at 09:32

## 2019-06-07 RX ADMIN — SODIUM CHLORIDE: 9 INJECTION, SOLUTION INTRAVENOUS at 15:15

## 2019-06-07 ASSESSMENT — MIFFLIN-ST. JEOR: SCORE: 1499.33

## 2019-06-07 NOTE — PROGRESS NOTES
Pre-procedure complete for coronary angiogram, heart biopsy, and right heart cath. IV in place. Groin prep complete, pedal pulses marked, consent completed with all questions answered. Verna, EARL notified of low hemoglobin by this RN.

## 2019-06-07 NOTE — TELEPHONE ENCOUNTER
Discussed changes in appt with pt - moved clinic to 6/11 Tuesday AM with RADHA Marks (TT out of [facility]).   Complete testing on Monday 6/10 as scheduled - however do lab check at 11:30 CBC with iron studies)     Angiogram rescheduled for 6/28.   Pt will review on My Chart

## 2019-06-07 NOTE — H&P
[facility]   Interventional Cardiology   History and Physical     Murray Nicholson MRN# [id number]   YOB: 1955 Age: 64 year old     Chief Complaint: Presenting for one year evaluation of cardiac transplant    HPI:   Mr. Murray Nicholson is a 64yr old male with a history of NICM s/p OHT 6/14/18, HTN, HL, DMII, and ESRD (on hemodialysis since fall 2017) who presents to the cath lab for CORS, RHC, Endomyocardial biopsy, and IVUS.     His post-transplant course has been complicated by:  - leukocytosis  - RIJ thrombus infected with CoNS  - incidental pneumoperitoneum  - neutropenia  - oral mucosal ulcer secondary to neutropenia with Klebsiella infection  - pseudomonas bacteremia, resolved  - perforation of the small bowel, s/p small bowel resection and ileostomy     His biopsies have remained negative for rejection.  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  Last echo 12/2018 showed stable graft function with LVEF 65-70% and normal RV size and function.  Last RHC 12/2018 showed low biventricular filling pressures with RA 2, PCW 2, mPA 13, and CI 4.1.    Since his last visit with Cleo Marks NP, he has had a left arm AV fistula created. He has been feeling very well. No complaints today. No problems with his fistula which was placed recently, have not begun using it yet. Continues with dialysis access on right chest.     No lightheaded/dizziness, no syncope, no chest pain, no palpitations, no edema.    No fever/chills, no night sweats, no sore throat, no cough, no N/V/D, no dysuira, no rashes/wounds.    Last ASA was 81 mg last night, will give additional this morning.    ROS: The patient is currently denying fever/chills, chest pain, shortness of breath, cough, nausea/vomiting/diarrhea, and swelling in the legs. All other systems were reviewed and were  negative.      Consenting/Education for Cardiac Cath Lab Procedure: Right Heart Catheterization, Cardiac Biopsy, Coronary Angiogram and Possible Percutaneous Intervention and IVUS     Patient understands we would like to perform .Right Heart Catheterization, Cardiac Biopsy, Coronary Angiogram and Possible Percutaneous Intervention and IVUS due to transplant monitoring. This procedure will be performed by Dr. Posada.    The patient understands the following:     Right Heart Catheterization: A fine tube (catheter) is put into the vein of the groin/neck.  It is carefully passed along until it reaches the heart and then goes up into the blood vessels of the lungs. This is done to measure a variety of pressures in your heart and can tell us how well the heart is filling and emptying, as well as monitor fluid status. , Endomyocardial Biopsy: A catheter is placed in your neck/groin vein, a 'Biopsy forceps' or pincers at the end of the catheter are used to take the tissue samples. This is may be repeated to get enough samples. The biopsy pieces are very small (one to two millimeters).  and Coronary Angiogram with Possible Percutaneous Intervention: During the portion for the coronary angiogram a fine tube (catheter) is put into the artery in the groin/arm. The tube is carefully passed into each coronary artery. A series of pictures are taken using x-rays and a contrast medium (x-ray dye). The contrast medium is injected to look for any blockages or narrowing in the arteries of the heart. If necessary and indicated, a stent may be deployed during this procedure.  At the end of the procedure the artery may be closed with a special plug, Angio Seal, to stop the bleeding.     Moderate sedation is required for this procedure, which the patient understands. Patient also understands risks and complications of the procedure which include, but are not limited to bruising/swelling around the incision site, infection, bleeding,  allergic reaction to local anesthetic, air embolism, arterial puncture, stroke, heart attack.       Patient verbalized understanding of risks and benefits and has elected to proceed with the procedure or procedures listed above.      Past Medical History:   Diagnosis Date     (HFpEF) heart failure with preserved ejection fraction (H)      Allergic rhinitis, cause unspecified      Anemia of chronic kidney failure      AS (aortic stenosis)      Ascending aortic aneurysm (H)      Bicuspid aortic valve      CAD (coronary artery disease)      Chronic kidney disease, stage 5 (H)      Congestive heart failure, unspecified      Dialysis patient (H)     Tues-Thur-Sat     Dyslipidemia      Esophageal reflux      ESRD (end stage renal disease) (H)      Hearing problem      Heart replaced by transplant (H) 12/10/2018     Hypersomnia with sleep apnea, unspecified      Hypertension      Ileostomy status (H)      Immunosuppression (H)      Kidney problem      MGUS (monoclonal gammopathy of unknown significance)      Mitral regurgitation      SHEELA (obstructive sleep apnea)     No CPAP     Pneumonia      Systolic heart failure (H)      Type 2 diabetes mellitus (H)        Past Surgical History:   Procedure Laterality Date     CARDIAC SURGERY       COLONOSCOPY N/A 5/3/2018    Procedure: COLONOSCOPY;  colonoscopy ;  Surgeon: Ammon Castillo MD;  Location:  GI     CREATE FISTULA ARTERIOVENOUS UPPER EXTREMITY BOVINE Left 5/8/2019    Procedure: Left Upper Extremity Arteriovenous Bovine Graft Creation;  Surgeon: Calin Cheney MD;  Location: [facility] OR     CV HEART BIOPSY N/A 2/1/2019    Procedure: HBX;  Surgeon: Montrell Posada MD;  Location:  HEART CARDIAC CATH LAB     CV HEART BIOPSY N/A 3/22/2019    Procedure: HBX, RIJV ACCESS;  Surgeon: Jordan Fox MD;  Location:  HEART CARDIAC CATH LAB     ESOPHAGOSCOPY, GASTROSCOPY, DUODENOSCOPY (EGD), COMBINED N/A 5/7/2018    Procedure: COMBINED ENDOSCOPIC ULTRASOUND, ESOPHAGOSCOPY,  GASTROSCOPY, DUODENOSCOPY (EGD), FINE NEEDLE ASPIRATE/BIOPSY;  Endoscopic Ultrasound with Fine Needle Aspiration ;  Surgeon: Alon Don MD;  Location: [facility] OR     EXAM UNDER ANESTHESIA RECTUM N/A 8/12/2018    Procedure: EXAM UNDER ANESTHESIA RECTUM;  EXAM UNDER ANESTHESIA RECTUM ,COMBINED INCISION AND DRAINAGE OF RECTAL ABCESS ;  Surgeon: Rick Tran MD;  Location: [facility] OR     INCISION AND DRAINAGE RECTUM, COMBINED N/A 8/12/2018    Procedure: COMBINED INCISION AND DRAINAGE RECTUM;;  Surgeon: Rick Tran MD;  Location: [facility]     LAPAROSCOPIC ASSISTED COLOSTOMY TAKEDOWN N/A 12/11/2018    Procedure: Laparoscopic Assisted Colostomy Takedown, Laparoscopic Lysis of Adhesions;  Surgeon: Rick Tran MD;  Location: [facility]     LAPAROSCOPIC ASSISTED SIGMOID COLECTOMY N/A 8/14/2018    Procedure: LAPAROSCOPIC ASSISTED SIGMOID COLECTOMY;  Laparoscopic Hand Assisted Takedown of Splenic Flexure, Sigmoidectomy, Small Bowel Resection, Takedown of Small Bowel to Colon Fistula;  Surgeon: Rick Tran MD;  [facility]: [facility]     LAPAROSCOPIC HERNIORRHAPHY INGUINAL BILATERAL Bilateral 7/24/2015    Procedure: LAPAROSCOPIC HERNIORRHAPHY INGUINAL BILATERAL;  Surgeon: Bobby Mcconnell MD;  Location: [facility]     LAPAROSCOPIC INSERTION CATHETER PERITONEAL DIALYSIS N/A 6/22/2017    Procedure: LAPAROSCOPIC INSERTION CATHETER PERITONEAL DIALYSIS;  Laparoscopic Peritoneal Dialysis Catheter Placement - Anesthesia with block;  Surgeon: Esteban Arvizu MD;  Location: [facility]     PICC INSERTION Left 04/22/2018    5Fr - 49cm (3cm external), Basilic vein, low SVC     REMOVE CATHETER PERITONEAL Right 1/15/2018    Procedure: REMOVE CATHETER PERITONEAL;  Open Removal of Peritoneal Dialysis Catheter ;  Surgeon: Esteban Arvizu MD;  [facility]: [facility]     SIGMOIDOSCOPY FLEXIBLE N/A 11/21/2018    Procedure: Examination Under Anesthesia, Flexible Sigmoidoscopy and Polypectomy;  Surgeon: Chelsea  Rick Izaguirre MD;  [facility]: [facility]     SIGMOIDOSCOPY FLEXIBLE N/A 12/11/2018    Procedure: Flexible Sigmoidoscopy;  Surgeon: Rick Tran MD;  [facility]: [facility]     TAKEDOWN ILEOSTOMY N/A 3/27/2019    Procedure: Takedown Ileostomy;  Surgeon: Rick Tran MD;  [facility]     TRANSPLANT HEART RECIPIENT N/A 6/14/2018    Procedure: TRANSPLANT HEART RECIPIENT;  Median Sternotomy, on-pump oxygenator, Heart Transplant;  Surgeon: Rony Caputo MD;  Location: [facility]         No current facility-administered medications on file prior to encounter.   Current Outpatient Medications on File Prior to Encounter:  acetaminophen (TYLENOL) 500 MG tablet Take 2 tablets (1,000 mg) by mouth 4 times daily   albuterol (PROAIR HFA/PROVENTIL HFA/VENTOLIN HFA) 108 (90 Base) MCG/ACT inhaler Inhale 2 puffs into the lungs every 4 hours as needed for shortness of breath / dyspnea or wheezing   amLODIPine (NORVASC) 10 MG tablet Take 1 tablet (10 mg) by mouth daily   atorvastatin (LIPITOR) 40 MG tablet Take 1 tablet (40 mg) by mouth daily   biotin (BIOTIN 5000) 5 MG CAPS Take 5 mg by mouth daily   blood glucose monitoring (NO BRAND SPECIFIED) test strip Use to test blood sugar 2-3 times daily or as directed.   cloNIDine (CATAPRES) 0.1 MG tablet Take 1 tablet (0.1 mg) by mouth At Bedtime   fluticasone (FLONASE) 50 MCG/ACT spray Spray 1-2 sprays into both nostrils daily (Patient not taking: Reported on 4/8/2019)   hydrALAZINE (APRESOLINE) 100 MG TABS tablet Take 1 tablet (100 mg) by mouth every 8 hours   insulin aspart (NOVOLOG PEN) 100 UNIT/ML injection Inject 1-7 Units Subcutaneous 4 times daily (before meals and nightly) (Patient taking differently: Inject 1-7 Units Subcutaneous 4 times daily (before meals and nightly) Sliding scale)   lisinopril (PRINIVIL/ZESTRIL) 10 MG tablet Take 1 tablet (10 mg) by mouth daily   loratadine (CLARITIN) 10 MG tablet Take 10 mg by mouth daily as needed Reported on 5/3/2017  "  melatonin 1 MG TABS tablet Take 2 tablets (2 mg) by mouth nightly as needed for sleep   NEPHROCAPS 1 MG capsule Take 1 capsule by mouth daily   order for DME Coloplast       1 piece convex light Uriel cut up to 1  \" with filter #[id number]     Or[employer]ollister         1 piece soft convex 2 \" #31643  Accessories   2\" barrier ring #[id number]                        Adapt paste #[id number]                        Adapt powder #7906                        No sting film barrier # [id number]                        Brava elastic barrier strips curved # [id number] Diagnosis: New ileostomy   pantoprazole (PROTONIX) 40 MG EC tablet Take 1 tablet (40 mg) by mouth 2 times daily (before meals)   traMADol (ULTRAM) 50 MG tablet Take 2 tablets (100 mg) by mouth every 12 hours as needed for severe pain       Family History   Problem Relation Age of Onset     C.A.D. Father          from-never knew father-age 60     Diabetes Father      Cerebrovascular Disease Father      Hypertension Father      Hypertension No family hx of      Breast Cancer No family hx of      Cancer - colorectal No family hx of      Prostate Cancer No family hx of      Kidney Disease No family hx of      Melanoma No family hx of      Skin Cancer No family hx of        Social History     Tobacco Use     Smoking status: Former Smoker     Packs/day: 1.00     Years: 20.00     Pack years: 20.00     Types: Cigarettes     Start date: 1984     Last attempt to quit: 1994     Years since quittin.8     Smokeless tobacco: Never Used   Substance Use Topics     Alcohol use: No     Alcohol/week: 0.0 oz       Allergies   Allergen Reactions     Norco [Hydrocodone-Acetaminophen] Nausea and Vomiting     Cats      Throat tightness     Isosorbide Other (See Comments)     hypotension     Penicillins Hives     Seasonal Allergies      rhinitis     Shrimp      Throat closes          Physical Examination:  Vitals: There were no vitals taken for this visit.  BMI= There is no height or weight " on file to calculate BMI.    Constitutional: Alert and awake, well appearing, in no significant pain or respiratory distress  ENT: Mallampati III  Skin: No erythema or signs of infection, A/V fistula on left upper arm, palpable thrill, no surrounding erythema, catheter line on right chest  Cardiac: RRR, no r/m/g  Pulmonary: Normal respiratory effort, good air movement, no adventitious lung sounds, no areas of decreased lung sounds  GI: +BS, NTTP  Extremities: No LE edema, all extremities are warm and well-perfused  Psych: Calm, normal affect  Neuro: Grossly-non focal    Laboratory:  CMPNo lab results found in last 7 days.  CBCNo lab results found in last 7 days.    Lab Results   Component Value Date    TROPI 0.033 07/26/2018    TROPI 0.247 () 02/03/2018    TROPI 0.247 () 02/02/2018         EKG: NSR at a rate of 80, no blocks, MS 0.19, QRS narrow, qTC 460, no ST elevations or depressions, no TWI, no q-waves    Assessment and plan:     - Proceed with CORS/IVUS/RHC/Endomyocardial biopsy as planned  - Not on anticoagulation  - Takes 75 mg plavix daily since A/v fistula placement, take daily baby ASA, will give additional 243 ASA  - K today of 4.2 acceptable/x mEq K given  - Plt acceptable  - Dialysis tomorrow as planned    Samanta Tapia PA-C  [facility] Card[facility]    -----------------------    Addendum:   Pt found to have Hgb of 6.8->6.4. Fully asymptomatic, reassuring vital signs. No signs or symptoms of bleeding. Unclear cause, tends to run in the 11-12s, although more recently has been in the 6's.    Pt has dialysis tomorrow, prefers to get blood with us today. Will give one unit. Non-radiated per transfusion guidelines and discussion with Dr. Blum.     - 1 UPRBC slow today  - HD tomorrow  - Recheck Hgb with dialysis  - Transplant coordinator will attempt to arrange for transfer of labs to [facility] from dialysis center  - Transplant appointment Monday or Tuesday for further labs and work-up of anemia (also  leukopenia).  - CSI fellow will follow-up post-transfusion Hgb, if appropriate can discharge tonight. RN aware to page CSI fellow with results.    Samanta Tapia PA-C  [facility] Card[facility]

## 2019-06-07 NOTE — TELEPHONE ENCOUNTER
LM for patient to call [phone] to reschedule [place] appt on 6/10 with either Cleo Marks on 6/11 or Mellisa Suh on 6/13.  Please transfer to schedulers or myself to schedule.

## 2019-06-08 NOTE — PROGRESS NOTES
"Discharge instructions reviewed.  Patient verbalized understanding. PIV removed, patient ambulated to the main lobby. Family awaiting at main lobby. Patient discharged with all his belongings and the signed Tramadol script was sent with the patient. Blood pressure 128/68, pulse 78, temperature 98  F (36.7  C), temperature source Oral, resp. rate 14, height 1.727 m (5' 8\"), weight 73.5 kg (162 lb), SpO2 100 %.      "

## 2019-06-10 ENCOUNTER — HOSPITAL ENCOUNTER (OUTPATIENT)
Dept: CARDIOLOGY | Facility: CLINIC | Age: 64
End: 2019-06-10
Attending: INTERNAL MEDICINE
Payer: MEDICARE

## 2019-06-10 ENCOUNTER — HOSPITAL ENCOUNTER (OUTPATIENT)
Dept: GENERAL RADIOLOGY | Facility: CLINIC | Age: 64
Discharge: HOME | End: 2019-06-10
Attending: INTERNAL MEDICINE | Admitting: INTERNAL MEDICINE
Payer: MEDICARE

## 2019-06-10 ENCOUNTER — HOSPITAL ENCOUNTER (OUTPATIENT)
Dept: CARDIOLOGY | Facility: CLINIC | Age: 64
Discharge: HOME | End: 2019-06-10
Attending: INTERNAL MEDICINE | Admitting: INTERNAL MEDICINE
Payer: MEDICARE

## 2019-06-10 DIAGNOSIS — Z94.1 HEART REPLACED BY TRANSPLANT (H): ICD-10-CM

## 2019-06-10 DIAGNOSIS — E11.69 TYPE 2 DIABETES MELLITUS WITH OTHER SPECIFIED COMPLICATION, WITHOUT LONG-TERM CURRENT USE OF INSULIN (H): ICD-10-CM

## 2019-06-10 DIAGNOSIS — D64.9 HEMOGLOBIN LOW: ICD-10-CM

## 2019-06-10 LAB
BASOPHILS # BLD AUTO: 0 10E9/L (ref 0–0.2)
BASOPHILS NFR BLD AUTO: 0.5 %
DIFFERENTIAL METHOD BLD: ABNORMAL
EOSINOPHIL # BLD AUTO: 0.1 10E9/L (ref 0–0.7)
EOSINOPHIL NFR BLD AUTO: 2.5 %
ERYTHROCYTE [DISTWIDTH] IN BLOOD BY AUTOMATED COUNT: 19.1 % (ref 10–15)
FERRITIN SERPL-MCNC: 644 NG/ML (ref 26–388)
HBA1C MFR BLD: 5.2 % (ref 0–5.6)
HCT VFR BLD AUTO: 25.9 % (ref 40–53)
HGB BLD-MCNC: 8 G/DL (ref 13.3–17.7)
IMM GRANULOCYTES # BLD: 0.1 10E9/L (ref 0–0.4)
IMM GRANULOCYTES NFR BLD: 1.2 %
INTERPRETATION ECG - MUSE: NORMAL
IRON SATN MFR SERPL: 47 % (ref 15–46)
IRON SERPL-MCNC: 118 UG/DL (ref 35–180)
LAB SCANNED RESULT: NORMAL
LYMPHOCYTES # BLD AUTO: 1.1 10E9/L (ref 0.8–5.3)
LYMPHOCYTES NFR BLD AUTO: 27 %
MCH RBC QN AUTO: 30 PG (ref 26.5–33)
MCHC RBC AUTO-ENTMCNC: 30.9 G/DL (ref 31.5–36.5)
MCV RBC AUTO: 97 FL (ref 78–100)
MONOCYTES # BLD AUTO: 0.4 10E9/L (ref 0–1.3)
MONOCYTES NFR BLD AUTO: 9.6 %
NEUTROPHILS # BLD AUTO: 2.4 10E9/L (ref 1.6–8.3)
NEUTROPHILS NFR BLD AUTO: 59.2 %
NRBC # BLD AUTO: 0 10*3/UL
NRBC BLD AUTO-RTO: 0 /100
PLATELET # BLD AUTO: 262 10E9/L (ref 150–450)
RBC # BLD AUTO: 2.67 10E12/L (ref 4.4–5.9)
TIBC SERPL-MCNC: 251 UG/DL (ref 240–430)
WBC # BLD AUTO: 4.1 10E9/L (ref 4–11)

## 2019-06-10 PROCEDURE — 93306 TTE W/DOPPLER COMPLETE: CPT

## 2019-06-10 PROCEDURE — 94621 CARDIOPULM EXERCISE TESTING: CPT

## 2019-06-10 PROCEDURE — 36415 COLL VENOUS BLD VENIPUNCTURE: CPT | Performed by: INTERNAL MEDICINE

## 2019-06-10 PROCEDURE — 83540 ASSAY OF IRON: CPT | Performed by: INTERNAL MEDICINE

## 2019-06-10 PROCEDURE — 84238 ASSAY NONENDOCRINE RECEPTOR: CPT | Performed by: INTERNAL MEDICINE

## 2019-06-10 PROCEDURE — 83550 IRON BINDING TEST: CPT | Performed by: INTERNAL MEDICINE

## 2019-06-10 PROCEDURE — 71046 X-RAY EXAM CHEST 2 VIEWS: CPT

## 2019-06-10 PROCEDURE — 83036 HEMOGLOBIN GLYCOSYLATED A1C: CPT | Performed by: INTERNAL MEDICINE

## 2019-06-10 PROCEDURE — 94621 CARDIOPULM EXERCISE TESTING: CPT | Mod: 26 | Performed by: INTERNAL MEDICINE

## 2019-06-10 PROCEDURE — 85025 COMPLETE CBC W/AUTO DIFF WBC: CPT | Performed by: INTERNAL MEDICINE

## 2019-06-10 PROCEDURE — 82728 ASSAY OF FERRITIN: CPT | Performed by: INTERNAL MEDICINE

## 2019-06-10 PROCEDURE — 93306 TTE W/DOPPLER COMPLETE: CPT | Mod: 26 | Performed by: INTERNAL MEDICINE

## 2019-06-11 ENCOUNTER — OFFICE VISIT (OUTPATIENT)
Dept: CARDIOLOGY | Facility: CLINIC | Age: 64
End: 2019-06-11
Attending: NURSE PRACTITIONER
Payer: MEDICARE

## 2019-06-11 VITALS
SYSTOLIC BLOOD PRESSURE: 133 MMHG | BODY MASS INDEX: 24.56 KG/M2 | OXYGEN SATURATION: 100 % | DIASTOLIC BLOOD PRESSURE: 81 MMHG | HEART RATE: 91 BPM | HEIGHT: 69 IN | WEIGHT: 165.8 LBS

## 2019-06-11 DIAGNOSIS — Z94.1 HEART TRANSPLANT RECIPIENT (H): ICD-10-CM

## 2019-06-11 LAB
DONOR IDENTIFICATION: NORMAL
DSA COMMENTS: NORMAL
DSA PRESENT: NO
DSA TEST METHOD: NORMAL
EBV DNA # SPEC NAA+PROBE: NORMAL {COPIES}/ML
EBV DNA SPEC NAA+PROBE-LOG#: NORMAL {LOG_COPIES}/ML
ORGAN: NORMAL
PROTOCOL CUTOFF: NORMAL
SA1 CELL: NORMAL
SA1 COMMENTS: NORMAL
SA1 HI RISK ABY: NORMAL
SA1 MOD RISK ABY: NORMAL
SA1 TEST METHOD: NORMAL
SA2 CELL: NORMAL
SA2 COMMENTS: NORMAL
SA2 HI RISK ABY UA: NORMAL
SA2 MOD RISK ABY: NORMAL
SA2 TEST METHOD: NORMAL
STFR SERPL-SCNC: 1.8 MG/L (ref 2.2–5)
UNACCEPTABLE ANTIGEN: NORMAL
UNOS CPRA: 0

## 2019-06-11 PROCEDURE — G0463 HOSPITAL OUTPT CLINIC VISIT: HCPCS | Mod: ZF

## 2019-06-11 PROCEDURE — 99214 OFFICE O/P EST MOD 30 MIN: CPT | Mod: ZP | Performed by: NURSE PRACTITIONER

## 2019-06-11 RX ORDER — TACROLIMUS 1 MG/1
3 CAPSULE ORAL 2 TIMES DAILY
Qty: 180 CAPSULE | Refills: 11 | Status: ON HOLD | OUTPATIENT
Start: 2019-06-11 | End: 2019-10-28

## 2019-06-11 ASSESSMENT — PAIN SCALES - GENERAL: PAINLEVEL: NO PAIN (0)

## 2019-06-11 ASSESSMENT — MIFFLIN-ST. JEOR: SCORE: 1528.47

## 2019-06-11 NOTE — NURSING NOTE
Transplant Coordinator Note  Reason for visit: 1st annual post heart transplant  Coordinator: Present Lillie Ulloa  Caregiver:  LUIS    Health concerns addressed today:  Pt seen in clinic w/NP Kendrick. NP reviewed meds, labs, ECHO and CXR. Angio rescheduled for 6/28 due to low Hgb. Pt reports doing well overall, main c/o is tingling cold L hand; ? carpal tunnel.  VS and wght stable.     Immunosuppressants:   mg BID  FK 3/2 mg - goal 6-8; level 4.9. Dose increased to 3 mg BID   No DSAs  Immuknow 258  Allomaps 34, 27, 38, 37    Routine screenings:    Derm: DUE  Dental: DUE  Colonoscopy:  Prostate: PSA 1.4  Eye: DUE  Flu/Pneumonia: Discussed    Labs: Reviewed with pt     Medication record reviewed and reconciled  Questions and concerns addressed. AVS reviewed and copy provided.    Pt verbalized an understanding of plan of care.       Patient Instructions  ~Please call your coordinator at [phone] with any questions or concerns.    ~Dermatology this year  ~See dentist and eye doctor  ~Flu shot and Shringrix in fall  ~Increase Tacrolimus to 3 mg twice daily; recheck in 2 weeks. Non-fasting.  ~Return in 4 months.

## 2019-06-11 NOTE — LETTER
6/11/2019      RE: Murray Nicholson  [address]  [place] [address]       Dear Colleague,    Thank you for the opportunity to participate in the care of your patient, Murray Nicholson, at the [facility]. Please see a copy of my visit note below.    ADULT HE[facility] TRANSPLANT CLINIC  June 11, 2019    HPI:   Mr. Murray Nicholson is a 64yr old male with a history of NICM s/p OHT 6/14/18, HTN, HL, DMII, and ESRD (on hemodialysis since fall 2017) who presents to clinic for routine follow up.     His post-transplant course has been extremely complicated, and includes:  - leukocytosis  - RIJ thrombus infected with CoNS  - incidental pneumoperitoneum  - neutropenia  - oral mucosal ulcer secondary to neutropenia with Klebsiella infection  - pseudomonas bacteremia, resolved  - perforation of the small bowel, s/p small bowel resection and ileostomy, now s/p ileostomy takedown 3/27/19  - end-stage renal disease requiring hemodialysis, currently listed for renal transplant     His biopsies have remained negative for rejection.  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  Last echo 12/2018 showed stable graft function with LVEF 65-70% and normal RV size and function.  Last RHC 12/2018 showed low biventricular filling pressures with RA 2, PCW 2, mPA 13, and CI 4.1.     Since his last visit, he states that he feels well overall.  He remains active, with no exertional concerns.  He is eating well, with normal appetite and no nausea, vomiting, or diarrhea.  He states that his stools have normalized, and he is finally regular.  His weight has been stable, and his BPs remain at goal.  He notes ongoing left hand tingling since his graft placement, and states that he has seen a hand doctor who informed him that he has carpal tunnel.  He does exercises as prescribed by the  hand doctor, but has not experienced much improvement.  He otherwise denies chest pain, palpitations, shortness of breath, PND, orthopnea, dizziness, headaches, acute vision changes, fevers, chills, cough, and sore throat.    Serostatus:  CMV D+/R-  EBV D+/R+        Patient Active Problem List    Diagnosis Date Noted     Ileostomy status (H) 03/27/2019     Priority: Medium     Colostomy status (H) 12/11/2018     Priority: Medium     Heart replaced by transplant (H) 12/10/2018     Priority: Medium     Added automatically from request for surgery [id number]       Sigmoid diverticulitis 08/14/2018     Priority: Medium     Bacteremia due to Pseudomonas 08/12/2018     Priority: Medium     Heart transplant recipient (H) 07/26/2018     Priority: Medium     Fever 07/26/2018     Priority: Medium     Leukopenia 07/11/2018     Priority: Medium     Heart transplanted (H) 06/15/2018     Priority: Medium     HRD    Donor CMV + / Recipient CMV -    Donor EBV +        Anemia in stage 5 chronic kidney disease (H) 03/17/2017     Priority: Medium     Anemia, iron deficiency 02/15/2017     Priority: Medium     Type 2 diabetes mellitus with diabetic chronic kidney disease (H) 10/14/2015     Priority: Medium     Hypertension      Priority: Medium     Dyslipidemia      Priority: Medium     MGUS (monoclonal gammopathy of unknown significance)      Priority: Medium     Ascending aortic aneurysm (H)      Priority: Medium     Anemia in chronic renal disease 05/19/2015     Priority: Medium     updating diagnosis code for icd10 cutover       Anemia of chronic disease 04/12/2013     Priority: Medium     Problem list name updated by automated process. Provider to review and confirm       Hypertension goal BP (blood pressure) < 130/80 01/25/2011     Priority: Medium     Hyperlipidemia LDL goal <100 10/31/2010     Priority: Medium     Per provider       CKD (chronic kidney disease) stage 5, GFR less than 15 ml/min (H) 02/09/2010     Priority: Medium      Aortic stenosis 08/13/2008     Priority: Medium     Overview:   Bicuspid aortic valve       Pulmonary hypertension (H) 08/13/2008     Priority: Medium     Overview:   62 mm/Hg + RAP on 8/13/08 ECHO       Severe uncontrolled hypertension 08/13/2008     Priority: Medium     Allergic rhinitis 04/05/2006     Priority: Medium     Problem list name updated by automated process. Provider to review       Esophageal reflux 08/12/2004     Priority: Medium       PAST MEDICAL HISTORY:  Past Medical History:   Diagnosis Date     (HFpEF) heart failure with preserved ejection fraction (H)      Allergic rhinitis, cause unspecified      Anemia of chronic kidney failure      AS (aortic stenosis)      Ascending aortic aneurysm (H)      Bicuspid aortic valve      CAD (coronary artery disease)      Chronic kidney disease, stage 5 (H)      Congestive heart failure, unspecified      Dialysis patient (H)     Tues-Thur-Sat     Dyslipidemia      Esophageal reflux      ESRD (end stage renal disease) (H)      Hearing problem      Heart replaced by transplant (H) 12/10/2018     Hypersomnia with sleep apnea, unspecified      Hypertension      Ileostomy status (H)      Immunosuppression (H)      Kidney problem      MGUS (monoclonal gammopathy of unknown significance)      Mitral regurgitation      SHEELA (obstructive sleep apnea)     No CPAP     Pneumonia      Systolic heart failure (H)      Type 2 diabetes mellitus (H)        CURRENT MEDICATIONS:  Prescription Medications as of 6/11/2019       Rx Number Disp Refills Start End Last Dispensed Date Next Fill Date Owning Pharmacy    ACCU-CHEK GUIDE test strip  100 strip 0 4/15/2019    [facility] - [place], [place] - [address] [place], [place] [address]E AT [facility]    Sig: TEST BLOOD SUGAR 2 TO 3 TIMES DAILY OR AS DIRECTED    Class: E-Prescribe    Notes to Pharmacy: PLEASE ASK PATIENT TO SCHEDULE AN ANNUAL PREVENTATIVE VISIT WITH HIS PRIMARY FAMILY PRACTICE PROVIDER.    acetaminophen  (TYLENOL) 325 MG tablet  50 tablet 0 5/8/2019        Sig: Take 2 tablets (650 mg) by mouth every 4 hours as needed for mild pain    Class: OTC    Route: Oral    acetaminophen (TYLENOL) 500 MG tablet  60 tablet 1 4/1/2019    [address]    Sig: Take 2 tablets (1,000 mg) by mouth 4 times daily    Class: E-Prescribe    Route: Oral    albuterol (PROAIR HFA/PROVENTIL HFA/VENTOLIN HFA) 108 (90 Base) MCG/ACT inhaler            Sig: Inhale 2 puffs into the lungs every 4 hours as needed for shortness of breath / dyspnea or wheezing    Class: Historical    Route: Inhalation    amLODIPine (NORVASC) 10 MG tablet  90 tablet 3 10/23/2018    [address]    Sig: Take 1 tablet (10 mg) by mouth daily    Class: E-Prescribe    Route: Oral    atorvastatin (LIPITOR) 40 MG tablet  90 tablet 3 10/9/2018    [address]    Sig: Take 1 tablet (40 mg) by mouth daily    Class: E-Prescribe    Notes to Pharmacy: 90 day supply if able.    Route: Oral    biotin (BIOTIN 5000) 5 MG CAPS  30 capsule 3 10/19/2018    [address]    Sig: Take 5 mg by mouth daily    Class: E-Prescribe    Route: Oral    blood glucose monitoring (ACCU-CHEK FASTCLIX) lancets  102 each 1 4/11/2019    [facility] [address] - [place], [place] - [address] [place] [address] AT [facility]    Sig: Use to test blood sugar 2-3 times daily or as directed.  Ok to substitute alternative if insurance prefers.    Class: E-Prescribe    Notes to Pharmacy: Needs visit with provider    blood glucose monitoring (NO BRAND SPECIFIED) test strip  100 each 11 7/20/2018    [place] [id number] [facility] [id number]    Sig: Use to test blood sugar 2-3 times daily or as directed.    Class: E-Prescribe    cloNIDine  (CATAPRES) 0.1 MG tablet  90 tablet 3 11/6/2018    [address]    Sig: Take 1 tablet (0.1 mg) by mouth At Bedtime    Class: E-Prescribe    Notes to Pharmacy: 90 day supply if able.    Route: Oral    clopidogrel (PLAVIX) 75 MG tablet  30 tablet 0 5/8/2019    [address]    Sig: Take 1 tablet (75 mg) by mouth daily    Class: E-Prescribe    Route: Oral    hydrALAZINE (APRESOLINE) 100 MG TABS tablet  90 tablet 11 10/23/2018    [address]    Sig: Take 1 tablet (100 mg) by mouth every 8 hours    Class: E-Prescribe    Route: Oral    insulin aspart (NOVOLOG PEN) 100 UNIT/ML injection  9 mL 3 10/10/2018    [address]    Sig: Inject 1-7 Units Subcutaneous 4 times daily (before meals and nightly)    Class: E-Prescribe    Route: Subcutaneous    lisinopril (PRINIVIL/ZESTRIL) 10 MG tablet  90 tablet 3 12/27/2018    [facility] [address] - [place], [place] - [address] [facility]    Sig: Take 1 tablet (10 mg) by mouth daily    Class: E-Prescribe    Route: Oral    loratadine (CLARITIN) 10 MG tablet            Sig: Take 10 mg by mouth daily as needed Reported on 5/3/2017    Class: Historical    Route: Oral    melatonin 1 MG TABS tablet  120 tablet 1 7/20/2018    [address]    Sig: Take 2 tablets (2 mg) by mouth nightly as needed for sleep    Class: E-Prescribe    Route: Oral    mycophenolate (GENERIC EQUIVALENT) 250 MG capsule  180 capsule 11 3/22/2019    [facility] [address] - [place], [place] - [address] [facility]    Sig: Take 3 capsules (750 mg) by mouth 2 times daily    Class: E-Prescribe    Route: Oral    NEPHROCAPS 1 MG capsule  120 capsule 0 7/2/2018     "[address]    Sig: Take 1 capsule by mouth daily    Class: Local Print    Route: Oral    order for DME  30 each 11 12/17/2018 12/17/2019   [address]    Sig: Coloplast       1 piece convex light Scott cut up to 1  \" with filter #[id number]       Or  Shashi         1 piece soft convex 2 1/8\" #[id number]        Accessories   2\" barrier ring #[id number]                           Adapt paste #[id number]                           Adapt powder #[id number]                          No sting film barrier # [id number]                          Brava elastic barrier strips curved # [id number]    Treatment Diagnosis: New ileostomy    Class: Local Print    oxyCODONE (ROXICODONE) 5 MG tablet  10 tablet 0 5/8/2019    [address]    Sig: Take 1-2 tablets (5-10 mg) by mouth every 6 hours as needed for moderate to severe pain    Class: Local Print    Earliest Fill Date: 5/8/2019    Route: Oral    pantoprazole (PROTONIX) 40 MG EC tablet  60 tablet 11 11/12/2018    [facility] - [place], [place] - [facility] [address] AT [facility]    Sig: Take 1 tablet (40 mg) by mouth 2 times daily (before meals)    Class: E-Prescribe    Route: Oral    senna-docusate (SENOKOT-S/PERICOLACE) 8.6-50 MG tablet  30 tablet 0 6/7/2019    [facility] [address] - [place], [place] - [address] AT [facility]    Sig: Take 1-2 tablets by mouth 2 times daily    Class: E-Prescribe    Route: Oral    tacrolimus (GENERIC EQUIVALENT) 1 MG capsule  150 capsule 11 4/25/2019    [facility] [address] - [place], [place] - [address] AT [facility]    Sig: Take three capsules (3 mg) in the AM and two capsules (2 mg) in the PM    Class: E-Prescribe    Notes to Pharmacy: Decrease in dose    traMADol (ULTRAM) 50 MG tablet  10 tablet 0 6/7/2019    [facility] [place] [place] " "[id number] - [place], [place] - [address] AT [facility]    Sig: Take 0.5-1 tablets (25-50 mg) by mouth every 6 hours as needed for severe pain    Class: Local Print    Route: Oral    traMADol (ULTRAM) 50 MG tablet  28 tablet 0 4/1/2019    [facility] - [place], [place] - [address] [facility]    Sig: Take 2 tablets (100 mg) by mouth every 12 hours as needed for severe pain    Class: Local Print    Route: Oral    traMADol (ULTRAM) 50 MG tablet            Sig: Take 50 mg by mouth every 6 hours as needed for severe pain    Class: Historical    Route: Oral      Clinic-Administered Medications as of 6/11/2019       Dose Frequency Start End    diatrizoate meglumine-sodium (GASTROGRAFIN/GASTROVIEW) 66-10 % solution 480 mL 480 mL ONCE 3/26/2019     Sig: Place 480 mLs rectally once    Route: Rectal          ROS:   Constitutional: No fever, chills, or sweats. Reports stable weight.   ENT: No visual disturbance, ear ache, epistaxis, sore throat.   Allergies/Immunologic: Negative.   Respiratory: No cough, hemoptysis.   Cardiovascular: As per HPI.   GI: No nausea, vomiting, hematemesis, melena, or hematochezia.   : No urinary frequency, dysuria, or hematuria.   Integument: Negative.   Psychiatric: Negative.   Neuro: As per HPI.  Endocrinology: Negative.   Musculoskeletal: Negative    Exam:  /81 (BP Location: Right arm, Patient Position: Chair, Cuff Size: Adult Regular)   Pulse 91   Ht 1.746 m (5' 8.75\")   Wt 75.2 kg (165 lb 12.8 oz)   SpO2 100%   BMI 24.66 kg/m     In general, the patient is a pleasant male in no apparent distress.    HEENT: NC/AT. PERRLA. EOMI.  Sclerae white, not injected.    Neck:  No adenopathy, No thyromegaly.    COR: JVD at clavicle when sitting upright.  RRR.  Normal S1 S2 splits physiologically.  No murmur, rub click, or gallop.    Lungs:  CTA. No rhonchi.    Abdomen: soft, nontender, nondistended.  No organomegaly.  Extremities:  No clubbing, cyanosis, or LE " edema.    Neuro: Alert & Oriented x 3, grossly non focal.  Integument: no open lesions, rashes, or jaundice    Labs:  CBC RESULTS:   Lab Results   Component Value Date    WBC 4.1 06/10/2019    RBC 2.67 (L) 06/10/2019    HGB 8.0 (L) 06/10/2019    HCT 25.9 (L) 06/10/2019    MCV 97 06/10/2019    MCH 30.0 06/10/2019    MCHC 30.9 (L) 06/10/2019    RDW 19.1 (H) 06/10/2019     06/10/2019       BMP RESULTS:  Lab Results   Component Value Date     06/07/2019    POTASSIUM 4.2 06/07/2019    CHLORIDE 99 06/07/2019    CO2 27 06/07/2019    ANIONGAP 8 06/07/2019     (H) 06/07/2019    BUN 27 06/07/2019    CR 5.16 (H) 06/07/2019    GFRESTIMATED 11 (L) 06/07/2019    GFRESTBLACK 13 (L) 06/07/2019    TRINI 9.1 06/07/2019      LIPID RESULTS:  Lab Results   Component Value Date    CHOL 110 06/07/2019    HDL 51 06/07/2019    LDL 31 06/07/2019    TRIG 139 06/07/2019    CHOLHDLRATIO 5.0 08/10/2015    NHDL 59 06/07/2019       IMMUNOSUPPRESSANT LEVELS  Lab Results   Component Value Date    TACROL 4.9 (L) 06/07/2019    DOSTAC Not Provided 06/07/2019       No components found for: CK  Lab Results   Component Value Date    MAG 1.3 (L) 06/07/2019     Lab Results   Component Value Date    A1C 5.2 06/10/2019     Lab Results   Component Value Date    PHOS 3.0 06/07/2019     Lab Results   Component Value Date    NTBNPI >175,000 (H) 07/26/2018     Lab Results   Component Value Date    SAITESTMET SA FCS 11/13/2018    SAICELL Class I 11/13/2018    [id number] None 11/13/2018    YY2STFIHQN None 11/13/2018    SAIREPCOM  11/13/2018      Test performed by modified procedure. Serum heat inactivated and tested   by a modified ([place]) protocol including fetal calf serum addition.   High-risk, mfi >3,000. Mod-risk, mfi 500-3,000.       Lab Results   Component Value Date    SAIITESTME SA FCS 11/13/2018    SAIICELL Class II 11/13/2018    [id number] None 11/13/2018    [id number] None 11/13/2018    SAIIREPCOM  11/13/2018      Test performed by  modified procedure. Serum heat inactivated and tested   by a modified ([place]) protocol including fetal calf serum addition.   High-risk, mfi >3,000. Mod-risk, mfi 500-3,000.       Lab Results   Component Value Date    CSPEC Plasma, EDTA anticoagulant 06/07/2019       Assessment and Plan:  Mr. Murray Nicholson is a 64yr old male with a history of NICM s/p OHT 6/14/18, HTN, HL, DMII, and ESRD (on hemodialysis since fall 2017) who presents to clinic for routine follow up.     NICM, s/p OHT 6/14/18  Donor 3-v CAD  His post-transplant course has been extremely complicated, and includes:  - leukocytosis  - RIJ thrombus infected with CoNS  - incidental pneumoperitoneum  - neutropenia  - oral mucosal ulcer secondary to neutropenia with Klebsiella infection  - pseudomonas bacteremia, resolved  - perforation of the small bowel, s/p small bowel resection and ileostomy, now s/p ileostomy takedown 3/27/19  - end-stage renal disease requiring hemodialysis, currently listed for renal transplant     His biopsies have remained negative for rejection.  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  Last echo 12/2018 showed stable graft function with LVEF 65-70% and normal RV size and function.  Last RHC 12/2018 showed low biventricular filling pressures with RA 2, PCW 2, mPA 13, and CI 4.1.     BMP from last week showed stable electrolytes, kidney function, liver function (note improved Alk Phos), and cholesterol panel.  CBC from last week showed WBC improved to 4.1, with ANC 2.4.  Hgb up to 8.0 (following transfusion for Hgb 6.4 last week).  Iron studies showed ferritin 644, iron 118, iron sat 47%.  HgbA1c was down to 5.2%, and he is now off of insulin.      CXR from yesterday was negative for acute changes.  Echo showed stable graft function, with LVEF 65-70% and normal RV size/function.  His coronary angio and RHC have been  rescheduled for the end of this month.    Mr. Nicholson has continued to make progress.  His weight is stable, and he appears euvolemic today.  His BPs remain at goal.  Advised that he continue his current medications, with no changes, and that we will follow up with him re: the results of his cor angio in the coming weeks.       Serostatus:  - CMV D+/R-  - EBV D+/R+     Immunosuppression:  - MMF 750mg twice daily  - tacro, goal level 6-8.  Level from yesterday was 4.9, so increase tacro to 3mg twice daily.     PPx:  - CAV:  clopidogrel 75mg daily and atorvastatin 40mg daily  - GERD:  Pantoprazole 40mg twice daily  - Osteoporosis:  defer calcium/vitamin D supplements per renal team     Graft function:  - HR:  >80  - BPs:  Controlled, continue amlodipine 10mg daily, clonidine 0.1mg daily, hydralazine 100mg TID, lisinopril 10mg daily.  - fluid status:  Euvolemic, on HD Tu/Th/Sa     Health maintenance:  -  dermatology:  Has not seen, needs to schedule  - eye exam:  Overdue, needs to schedule  - dental exam:  Has not seen since transplant  - colonoscopy:  Due in 2021  - needs Shingrix    Follow Up:  - per protocol, 4 months with Dr. Ernst           The above was reviewed with Mr. Nicholson, who verbalized understanding and will call with further questions/concerns.     30 minutes spent face-to-face with patient, with >50% in counseling and/or coordination of care as described above.        Cleo Marks, DNP, APRN, FNP-BC  Advanced Heart Failure Nurse Practitioner  [facility]  Patient Care Team:  Yahir Turcios MD as PCP - General (Family Practice)  Arcelia Blum MD as MD (Cardiology)  Bobby Mcconnell MD as MD (Surgery)  Amrita Tobin RN as Nurse Coordinator (Thoracic Surgery (Cardiothoracic Vascular Surgery))  Kristi Armas PA as Physician Assistant (Physician Assistant)  Jaky Canela as Clinic Care Coordinator  Ana Luisa Mcpherson MD as MD  (Nephrology)  Lillie Ulloa, RN as Transplant Coordinator  [facility] ([facility] ([place]  Abram Moulton MD as MD (Family Practice)  Sunita Dobbins, TONY as Specialty Care Coordinator (Colon and Rectal Surgery)  Kaylin Fay APRN CNP as Nurse Practitioner (Nurse Practitioner)  Sterling Turcios MD as Assigned PCP  [facility]

## 2019-06-11 NOTE — PATIENT INSTRUCTIONS
Please call your coordinator at [phone] with any questions or concerns.      Dermatology this year    See dentist and eye doctor    Flu shot and Shringrix in fall    Increase tacrollimus to 3 mg twice daily; recheck in 2 weeks. Non-fasting.    Return in 4 months.

## 2019-06-11 NOTE — NURSING NOTE
Chief Complaint   Patient presents with     Follow Up     return heart txp     Vitals were taken and medications were reconciled.   Polly Del Real MA    7:27 AM

## 2019-06-11 NOTE — PROGRESS NOTES
ADULT HEART TRANSPLANT CLINIC  June 11, 2019    HPI:   Mr. Murray Nicholson is a 64yr old male with a history of NICM s/p OHT 6/14/18, HTN, HL, DMII, and ESRD (on hemodialysis since fall 2017) who presents to clinic for routine follow up.     His post-transplant course has been extremely complicated, and includes:  - leukocytosis  - RIJ thrombus infected with CoNS  - incidental pneumoperitoneum  - neutropenia  - oral mucosal ulcer secondary to neutropenia with Klebsiella infection  - pseudomonas bacteremia, resolved  - perforation of the small bowel, s/p small bowel resection and ileostomy, now s/p ileostomy takedown 3/27/19  - end-stage renal disease requiring hemodialysis, currently listed for renal transplant     His biopsies have remained negative for rejection.  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  Last echo 12/2018 showed stable graft function with LVEF 65-70% and normal RV size and function.  Last RHC 12/2018 showed low biventricular filling pressures with RA 2, PCW 2, mPA 13, and CI 4.1.     Since his last visit, he states that he feels well overall.  He remains active, with no exertional concerns.  He is eating well, with normal appetite and no nausea, vomiting, or diarrhea.  He states that his stools have normalized, and he is finally regular.  His weight has been stable, and his BPs remain at goal.  He notes ongoing left hand tingling since his graft placement, and states that he has seen a hand doctor who informed him that he has carpal tunnel.  He does exercises as prescribed by the hand doctor, but has not experienced much improvement.  He otherwise denies chest pain, palpitations, shortness of breath, PND, orthopnea, dizziness, headaches, acute vision changes, fevers, chills, cough, and sore throat.    Serostatus:  CMV D+/R-  EBV D+/R+        Patient Active Problem List    Diagnosis Date Noted      Ileostomy status (H) 03/27/2019     Priority: Medium     Colostomy status (H) 12/11/2018     Priority: Medium     Heart replaced by transplant (H) 12/10/2018     Priority: Medium     Added automatically from request for surgery [id number]       Sigmoid diverticulitis 08/14/2018     Priority: Medium     Bacteremia due to Pseudomonas 08/12/2018     Priority: Medium     Heart transplant recipient (H) 07/26/2018     Priority: Medium     Fever 07/26/2018     Priority: Medium     Leukopenia 07/11/2018     Priority: Medium     Heart transplanted (H) 06/15/2018     Priority: Medium     HRD    Donor CMV + / Recipient CMV -    Donor EBV +        Anemia in stage 5 chronic kidney disease (H) 03/17/2017     Priority: Medium     Anemia, iron deficiency 02/15/2017     Priority: Medium     Type 2 diabetes mellitus with diabetic chronic kidney disease (H) 10/14/2015     Priority: Medium     Hypertension      Priority: Medium     Dyslipidemia      Priority: Medium     MGUS (monoclonal gammopathy of unknown significance)      Priority: Medium     Ascending aortic aneurysm (H)      Priority: Medium     Anemia in chronic renal disease 05/19/2015     Priority: Medium     updating diagnosis code for icd10 cutover       Anemia of chronic disease 04/12/2013     Priority: Medium     Problem list name updated by automated process. Provider to review and confirm       Hypertension goal BP (blood pressure) < 130/80 01/25/2011     Priority: Medium     Hyperlipidemia LDL goal <100 10/31/2010     Priority: Medium     Per provider       CKD (chronic kidney disease) stage 5, GFR less than 15 ml/min (H) 02/09/2010     Priority: Medium     Aortic stenosis 08/13/2008     Priority: Medium     Overview:   Bicuspid aortic valve       Pulmonary hypertension (H) 08/13/2008     Priority: Medium     Overview:   62 mm/Hg + RAP on 8/13/08 ECHO       Severe uncontrolled hypertension 08/13/2008     Priority: Medium     Allergic rhinitis 04/05/2006     Priority:  Medium     Problem list name updated by automated process. Provider to review       Esophageal reflux 08/12/2004     Priority: Medium       PAST MEDICAL HISTORY:  Past Medical History:   Diagnosis Date     (HFpEF) heart failure with preserved ejection fraction (H)      Allergic rhinitis, cause unspecified      Anemia of chronic kidney failure      AS (aortic stenosis)      Ascending aortic aneurysm (H)      Bicuspid aortic valve      CAD (coronary artery disease)      Chronic kidney disease, stage 5 (H)      Congestive heart failure, unspecified      Dialysis patient (H)     Tues-Thur-Sat     Dyslipidemia      Esophageal reflux      ESRD (end stage renal disease) (H)      Hearing problem      Heart replaced by transplant (H) 12/10/2018     Hypersomnia with sleep apnea, unspecified      Hypertension      Ileostomy status (H)      Immunosuppression (H)      Kidney problem      MGUS (monoclonal gammopathy of unknown significance)      Mitral regurgitation      SHEELA (obstructive sleep apnea)     No CPAP     Pneumonia      Systolic heart failure (H)      Type 2 diabetes mellitus (H)        CURRENT MEDICATIONS:  Prescription Medications as of 6/11/2019       Rx Number Disp Refills Start End Last Dispensed Date Next Fill Date Owning Pharmacy    ACCU-CHEK GUIDE test strip  100 strip 0 4/15/2019    [id number] [place], [place] - [address] AT [facility]    Sig: TEST BLOOD SUGAR 2 TO 3 TIMES DAILY OR AS DIRECTED    Class: E-Prescribe    Notes to Pharmacy: PLEASE ASK PATIENT TO SCHEDULE AN ANNUAL PREVENTATIVE VISIT WITH HIS PRIMARY FAMILY PRACTICE PROVIDER.    acetaminophen (TYLENOL) 325 MG tablet  50 tablet 0 5/8/2019        Sig: Take 2 tablets (650 mg) by mouth every 4 hours as needed for mild pain    Class: OTC    Route: Oral    acetaminophen (TYLENOL) 500 MG tablet  60 tablet 1 4/1/2019    [facility] - [place], [place] - [address] [facility]    Sig: Take 2 tablets (1,000  mg) by mouth 4 times daily    Class: E-Prescribe    Route: Oral    albuterol (PROAIR HFA/PROVENTIL HFA/VENTOLIN HFA) 108 (90 Base) MCG/ACT inhaler            Sig: Inhale 2 puffs into the lungs every 4 hours as needed for shortness of breath / dyspnea or wheezing    Class: Historical    Route: Inhalation    amLODIPine (NORVASC) 10 MG tablet  90 tablet 3 10/23/2018    [address]    Sig: Take 1 tablet (10 mg) by mouth daily    Class: E-Prescribe    Route: Oral    atorvastatin (LIPITOR) 40 MG tablet  90 tablet 3 10/9/2018    [address]    Sig: Take 1 tablet (40 mg) by mouth daily    Class: E-Prescribe    Notes to Pharmacy: 90 day supply if able.    Route: Oral    biotin (BIOTIN 5000) 5 MG CAPS  30 capsule 3 10/19/2018    [address]    Sig: Take 5 mg by mouth daily    Class: E-Prescribe    Route: Oral    blood glucose monitoring (ACCU-CHEK FASTCLIX) lancets  102 each 1 4/11/2019    [id number] - [place], [place] - [address] AT [facility]    Sig: Use to test blood sugar 2-3 times daily or as directed.  Ok to substitute alternative if insurance prefers.    Class: E-Prescribe    Notes to Pharmacy: Needs visit with provider    blood glucose monitoring (NO BRAND SPECIFIED) test strip  100 each 11 7/20/2018    [phone]    Sig: Use to test blood sugar 2-3 times daily or as directed.    Class: E-Prescribe    cloNIDine (CATAPRES) 0.1 MG tablet  90 tablet 3 11/6/2018    [phone]    Sig: Take 1 tablet (0.1 mg) by mouth At Bedtime    Class: E-Prescribe    Notes to Pharmacy: 90 day supply if able.    Route: Oral    clopidogrel (PLAVIX) 75 MG tablet  30 tablet 0 5/8/2019     [address]    Sig: Take 1 tablet (75 mg) by mouth daily    Class: E-Prescribe    Route: Oral    hydrALAZINE (APRESOLINE) 100 MG TABS tablet  90 tablet 11 10/23/2018    [address]    Sig: Take 1 tablet (100 mg) by mouth every 8 hours    Class: E-Prescribe    Route: Oral    insulin aspart (NOVOLOG PEN) 100 UNIT/ML injection  9 mL 3 10/10/2018    [address]    Sig: Inject 1-7 Units Subcutaneous 4 times daily (before meals and nightly)    Class: E-Prescribe    Route: Subcutaneous    lisinopril (PRINIVIL/ZESTRIL) 10 MG tablet  90 tablet 3 12/27/2018    [facility] - [place], [place] - [address] AT [facility]    Sig: Take 1 tablet (10 mg) by mouth daily    Class: E-Prescribe    Route: Oral    loratadine (CLARITIN) 10 MG tablet            Sig: Take 10 mg by mouth daily as needed Reported on 5/3/2017    Class: Historical    Route: Oral    melatonin 1 MG TABS tablet  120 tablet 1 7/20/2018    [phone]    Sig: Take 2 tablets (2 mg) by mouth nightly as needed for sleep    Class: E-Prescribe    Route: Oral    mycophenolate (GENERIC EQUIVALENT) 250 MG capsule  180 capsule 11 3/22/2019    [facility] - [place], [place] - [address] [facility]    Sig: Take 3 capsules (750 mg) by mouth 2 times daily    Class: E-Prescribe    Route: Oral    NEPHROCAPS 1 MG capsule  120 capsule 0 7/2/2018    [address]    Sig: Take 1 capsule by mouth daily    Class: Local Print    Route: Oral    order for DME  30 each 11 12/17/2018 12/17/2019   [address]    Sig: Coloplast       1 piece convex light Uriel cut up to  "1  \" with filter #[id number]       Or  Shashi         1 piece soft convex 2 1/8\" #[id number]        Accessories   2\" barrier ring #[id number]                           Adapt paste #[id number]                           Adapt powder #[id number]                          No sting film barrier # [id number]                          Brava elastic barrier strips curved # [id number]    Treatment Diagnosis: New ileostomy    Class: Local Print    oxyCODONE (ROXICODONE) 5 MG tablet  10 tablet 0 5/8/2019    [facility] [place], [place] - [address] [place] [address]    Sig: Take 1-2 tablets (5-10 mg) by mouth every 6 hours as needed for moderate to severe pain    Class: Local Print    Earliest Fill Date: 5/8/2019    Route: Oral    pantoprazole (PROTONIX) 40 MG EC tablet  60 tablet 11 11/12/2018    [facility] - [place], [place] - [address] AT [facility]    Sig: Take 1 tablet (40 mg) by mouth 2 times daily (before meals)    Class: E-Prescribe    Route: Oral    senna-docusate (SENOKOT-S/PERICOLACE) 8.6-50 MG tablet  30 tablet 0 6/7/2019    [facility] - [place], [place] - [address] AT [facility]    Sig: Take 1-2 tablets by mouth 2 times daily    Class: E-Prescribe    Route: Oral    tacrolimus (GENERIC EQUIVALENT) 1 MG capsule  150 capsule 11 4/25/2019    [facility] - [place], [place] - [address] AT [facility]    Sig: Take three capsules (3 mg) in the AM and two capsules (2 mg) in the PM    Class: E-Prescribe    Notes to Pharmacy: Decrease in dose    traMADol (ULTRAM) 50 MG tablet  10 tablet 0 6/7/2019    [facility] - [place], [place] - [address] AT [facility]    Sig: Take 0.5-1 tablets (25-50 mg) by mouth every 6 hours as needed for severe pain    Class: Local Print    Route: Oral    traMADol (ULTRAM) 50 MG tablet  28 tablet 0 4/1/2019    [facility] - [place], [place] - [address] [place] [address]    " "Sig: Take 2 tablets (100 mg) by mouth every 12 hours as needed for severe pain    Class: Local Print    Route: Oral    traMADol (ULTRAM) 50 MG tablet            Sig: Take 50 mg by mouth every 6 hours as needed for severe pain    Class: Historical    Route: Oral      Clinic-Administered Medications as of 6/11/2019       Dose Frequency Start End    diatrizoate meglumine-sodium (GASTROGRAFIN/GASTROVIEW) 66-10 % solution 480 mL 480 mL ONCE 3/26/2019     Sig: Place 480 mLs rectally once    Route: Rectal          ROS:   Constitutional: No fever, chills, or sweats. Reports stable weight.   ENT: No visual disturbance, ear ache, epistaxis, sore throat.   Allergies/Immunologic: Negative.   Respiratory: No cough, hemoptysis.   Cardiovascular: As per HPI.   GI: No nausea, vomiting, hematemesis, melena, or hematochezia.   : No urinary frequency, dysuria, or hematuria.   Integument: Negative.   Psychiatric: Negative.   Neuro: As per HPI.  Endocrinology: Negative.   Musculoskeletal: Negative    Exam:  /81 (BP Location: Right arm, Patient Position: Chair, Cuff Size: Adult Regular)   Pulse 91   Ht 1.746 m (5' 8.75\")   Wt 75.2 kg (165 lb 12.8 oz)   SpO2 100%   BMI 24.66 kg/m    In general, the patient is a pleasant male in no apparent distress.    HEENT: NC/AT. PERRLA. EOMI.  Sclerae white, not injected.    Neck:  No adenopathy, No thyromegaly.    COR: JVD at clavicle when sitting upright.  RRR.  Normal S1 S2 splits physiologically.  No murmur, rub click, or gallop.    Lungs:  CTA. No rhonchi.    Abdomen: soft, nontender, nondistended.  No organomegaly.  Extremities:  No clubbing, cyanosis, or LE edema.    Neuro: Alert & Oriented x 3, grossly non focal.  Integument: no open lesions, rashes, or jaundice    Labs:  CBC RESULTS:   Lab Results   Component Value Date    WBC 4.1 06/10/2019    RBC 2.67 (L) 06/10/2019    HGB 8.0 (L) 06/10/2019    HCT 25.9 (L) 06/10/2019    MCV 97 06/10/2019    MCH 30.0 06/10/2019    MCHC 30.9 (L) " 06/10/2019    RDW 19.1 (H) 06/10/2019     06/10/2019       BMP RESULTS:  Lab Results   Component Value Date     06/07/2019    POTASSIUM 4.2 06/07/2019    CHLORIDE 99 06/07/2019    CO2 27 06/07/2019    ANIONGAP 8 06/07/2019     (H) 06/07/2019    BUN 27 06/07/2019    CR 5.16 (H) 06/07/2019    GFRESTIMATED 11 (L) 06/07/2019    GFRESTBLACK 13 (L) 06/07/2019    TRINI 9.1 06/07/2019      LIPID RESULTS:  Lab Results   Component Value Date    CHOL 110 06/07/2019    HDL 51 06/07/2019    LDL 31 06/07/2019    TRIG 139 06/07/2019    CHOLHDLRATIO 5.0 08/10/2015    NHDL 59 06/07/2019       IMMUNOSUPPRESSANT LEVELS  Lab Results   Component Value Date    TACROL 4.9 (L) 06/07/2019    DOSTAC Not Provided 06/07/2019       No components found for: CK  Lab Results   Component Value Date    MAG 1.3 (L) 06/07/2019     Lab Results   Component Value Date    A1C 5.2 06/10/2019     Lab Results   Component Value Date    PHOS 3.0 06/07/2019     Lab Results   Component Value Date    NTBNPI >175,000 (H) 07/26/2018     Lab Results   Component Value Date    SAITESTMET [facility] 11/13/2018    SAICELL Class I 11/13/2018    [id number] None 11/13/2018    [id number] None 11/13/2018    SAIREPCOM  11/13/2018      Test performed by modified procedure. Serum heat inactivated and tested   by a modified (Williams) protocol including fetal calf serum addition.   High-risk, mfi >3,000. Mod-risk, mfi 500-3,000.       Lab Results   Component Value Date    SAIITESTME [facility] 11/13/2018    SAIICELL Class II 11/13/2018    [id number] None 11/13/2018    PL2XPPWMHD None 11/13/2018    SAIIREPCOM  11/13/2018      Test performed by modified procedure. Serum heat inactivated and tested   by a modified (Williams) protocol including fetal calf serum addition.   High-risk, mfi >3,000. Mod-risk, mfi 500-3,000.       Lab Results   Component Value Date    CSPEC Plasma, EDTA anticoagulant 06/07/2019       Assessment and Plan:  Mr. Murray Nicholson is a 64yr old male with a  history of NICM s/p OHT 6/14/18, HTN, HL, DMII, and ESRD (on hemodialysis since fall 2017) who presents to clinic for routine follow up.     NICM, s/p OHT 6/14/18  Donor 3-v CAD  His post-transplant course has been extremely complicated, and includes:  - leukocytosis  - RIJ thrombus infected with CoNS  - incidental pneumoperitoneum  - neutropenia  - oral mucosal ulcer secondary to neutropenia with Klebsiella infection  - pseudomonas bacteremia, resolved  - perforation of the small bowel, s/p small bowel resection and ileostomy, now s/p ileostomy takedown 3/27/19  - end-stage renal disease requiring hemodialysis, currently listed for renal transplant     His biopsies have remained negative for rejection.  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  Last echo 12/2018 showed stable graft function with LVEF 65-70% and normal RV size and function.  Last RHC 12/2018 showed low biventricular filling pressures with RA 2, PCW 2, mPA 13, and CI 4.1.     BMP from last week showed stable electrolytes, kidney function, liver function (note improved Alk Phos), and cholesterol panel.  CBC from last week showed WBC improved to 4.1, with ANC 2.4.  Hgb up to 8.0 (following transfusion for Hgb 6.4 last week).  Iron studies showed ferritin 644, iron 118, iron sat 47%.  HgbA1c was down to 5.2%, and he is now off of insulin.      CXR from yesterday was negative for acute changes.  Echo showed stable graft function, with LVEF 65-70% and normal RV size/function.  His coronary angio and RHC have been rescheduled for the end of this month.    Mr. Nicholson has continued to make progress.  His weight is stable, and he appears euvolemic today.  His BPs remain at goal.  Advised that he continue his current medications, with no changes, and that we will follow up with him re: the results of his cor angio in the coming  weeks.       Serostatus:  - CMV D+/R-  - EBV D+/R+     Immunosuppression:  - MMF 750mg twice daily  - tacro, goal level 6-8.  Level from yesterday was 4.9, so increase tacro to 3mg twice daily.     PPx:  - CAV:  clopidogrel 75mg daily and atorvastatin 40mg daily  - GERD:  Pantoprazole 40mg twice daily  - Osteoporosis:  defer calcium/vitamin D supplements per renal team     Graft function:  - HR:  >80  - BPs:  Controlled, continue amlodipine 10mg daily, clonidine 0.1mg daily, hydralazine 100mg TID, lisinopril 10mg daily.  - fluid status:  Euvolemic, on HD Tu/Th/Sa     Health maintenance:  - dermatology:  Has not seen, needs to schedule  - eye exam:  Overdue, needs to schedule  - dental exam:  Has not seen since transplant  - colonoscopy:  Due in 2021  - needs Shingrix    Follow Up:  - per protocol, 4 months with Dr. Ernst           The above was reviewed with Mr. Nicholson, who verbalized understanding and will call with further questions/concerns.     30 minutes spent face-to-face with patient, with >50% in counseling and/or coordination of care as described above.        Cleo Marks, DNP, APRN, FNP-BC  Advanced Heart Failure Nurse Practitioner  [facility]  Patient Care Team:  Yahir Turcios MD as PCP - General (Family Practice)  Arcelia Blum MD as MD (Cardiology)  Bobby Mcconnell MD as MD (Surgery)  Amrita Tobin, TONY as Nurse Coordinator (Thoracic Surgery (Cardiothoracic Vascular Surgery))  Kristi Armas PA as Physician Assistant (Physician Assistant)  Jaky Canela as Clinic Care Coordinator  Ana Luisa Mcpherson MD as MD (Nephrology)  Lillie Ulloa, TONY as Transplant Coordinator  [facility] ([facility] ([place]  Abram Moulton MD as MD (Family Practice)  Sunita Dobbins, TONY as Specialty Care Coordinator (Colon and Rectal Surgery)  Kaylin Fay, APRN CNP as Nurse Practitioner (Nurse Practitioner)  Yahir Turcios,  MD as Assigned PCP  [facility]

## 2019-06-13 ENCOUNTER — TELEPHONE (OUTPATIENT)
Dept: TRANSPLANT | Facility: CLINIC | Age: 64
End: 2019-06-13

## 2019-06-13 DIAGNOSIS — D64.9 LOW HEMOGLOBIN: ICD-10-CM

## 2019-06-13 DIAGNOSIS — Z94.1 HEART REPLACED BY TRANSPLANT (H): Primary | ICD-10-CM

## 2019-06-13 NOTE — TELEPHONE ENCOUNTER
Pt called to report that Dialysis checked his Hgb on 6/11 - 7.1; had been checked at [facility] on 7/10 - 8.  Will plan to recheck on 6/17.

## 2019-06-17 ENCOUNTER — OFFICE VISIT (OUTPATIENT)
Dept: TRANSPLANT | Facility: CLINIC | Age: 64
End: 2019-06-17
Attending: SURGERY
Payer: MEDICARE

## 2019-06-17 VITALS
DIASTOLIC BLOOD PRESSURE: 68 MMHG | OXYGEN SATURATION: 100 % | WEIGHT: 167.6 LBS | HEART RATE: 89 BPM | BODY MASS INDEX: 24.93 KG/M2 | SYSTOLIC BLOOD PRESSURE: 116 MMHG

## 2019-06-17 DIAGNOSIS — D64.9 LOW HEMOGLOBIN: ICD-10-CM

## 2019-06-17 DIAGNOSIS — Z99.2 ENCOUNTER REGARDING VASCULAR ACCESS FOR DIALYSIS FOR ESRD (H): Primary | ICD-10-CM

## 2019-06-17 DIAGNOSIS — Z94.1 HEART REPLACED BY TRANSPLANT (H): ICD-10-CM

## 2019-06-17 DIAGNOSIS — N18.6 ENCOUNTER REGARDING VASCULAR ACCESS FOR DIALYSIS FOR ESRD (H): Primary | ICD-10-CM

## 2019-06-17 LAB
ERYTHROCYTE [DISTWIDTH] IN BLOOD BY AUTOMATED COUNT: 18.5 % (ref 10–15)
HCT VFR BLD AUTO: 22.4 % (ref 40–53)
HGB BLD-MCNC: 7 G/DL (ref 13.3–17.7)
MCH RBC QN AUTO: 30.8 PG (ref 26.5–33)
MCHC RBC AUTO-ENTMCNC: 31.3 G/DL (ref 31.5–36.5)
MCV RBC AUTO: 99 FL (ref 78–100)
PLATELET # BLD AUTO: 205 10E9/L (ref 150–450)
RBC # BLD AUTO: 2.27 10E12/L (ref 4.4–5.9)
WBC # BLD AUTO: 3.1 10E9/L (ref 4–11)

## 2019-06-17 PROCEDURE — G0463 HOSPITAL OUTPT CLINIC VISIT: HCPCS | Mod: ZF

## 2019-06-17 PROCEDURE — 85027 COMPLETE CBC AUTOMATED: CPT | Performed by: INTERNAL MEDICINE

## 2019-06-17 PROCEDURE — 36415 COLL VENOUS BLD VENIPUNCTURE: CPT | Performed by: INTERNAL MEDICINE

## 2019-06-17 ASSESSMENT — PAIN SCALES - GENERAL: PAINLEVEL: NO PAIN (0)

## 2019-06-17 NOTE — PROGRESS NOTES
Dialysis Access Service   Progress Note    S:  Mr. Nicholson is being seen today for surgical followup of his dialysis access.  He reports no issues with the wound, and  a little steal syndrome of the distal extremity.      O:  Pulse:  [89] 89  BP: (116)/(68) 116/68  SpO2:  [100 %] 100 %  GENERAL: alert, cooperative  Circulation:   Radial pulse 2+  Ulnar pulse  1+   Capillary refill:  capillary refill < 3 sec and no venous hypertension    Sensory exam:   arm: Normal   [x]           Abnormal   []          Comment:    hand: Normal   []           Abnormal   [x]          Comment:   Motor exam:   arm: Normal   [x]           Abnormal   []          Comment:    hand: Normal   [x]           Abnormal   []          Comment:    Access: L upper extremity wound(s) healed. non-tender. capillary refill < 3 sec and no venous hypertension.     Assessment & Plan: Mr. Nicholson's dialysis access has matured very well at this time point. Can start dialysis at this point. We do not have to see him unless needed. The patient was counselled to contact our nurse coordinator, VERONICA Daniel (Sum), [facility] at [phone] with any questions or concerns.  Thank you for the opportunity to participate in Mr. Nicholson's care.      Ayan Laguna  Transplant Surgery Department  Fellow  [phone]    I have reviewed history, examined patient and discussed plan with the fellow/resident/EARL.  I concur with the findings in this note.       Patient was counseled on complications of incision and short and long term care to minimize infection/thrombosis risk.      TT: 15 min  CT: 10 min    .

## 2019-06-17 NOTE — LETTER
6/17/2019      RE: Murray Nicholson  [address]  [place] [address]       Chief Complaint   Patient presents with     RECHECK     F/u for graft     Blood pressure 116/68, pulse 89, weight 76 kg (167 lb 9.6 oz), SpO2 100 %.    Roxana Urbina CMA      Dialysis Access Service   Progress Note    S:  Mr. Nicholson is being seen today for surgical followup of his dialysis access.  He reports no issues with the wound, and  a little steal syndrome of the distal extremity.      O:  Pulse:  [89] 89  BP: (116)/(68) 116/68  SpO2:  [100 %] 100 %  GENERAL: alert, cooperative  Circulation:   Radial pulse 2+  Ulnar pulse  1+   Capillary refill:  capillary refill < 3 sec and no venous hypertension    Sensory exam:   arm: Normal   [x]           Abnormal   []          Comment:    hand: Normal   []           Abnormal   [x]          Comment:   Motor exam:   arm: Normal   [x]           Abnormal   []          Comment:    hand: Normal   [x]           Abnormal   []          Comment:    Access: L upper extremity wound(s) healed. non-tender. capillary refill < 3 sec and no venous hypertension.     Assessment & Plan: Mr. Nicholson's dialysis access has matured very well at this time point. Can start dialysis at this point. We do not have to see him unless needed. The patient was counselled to contact our nurse coordinator, VERONICA Daniel (Sum), [facility] at [phone] with any questions or concerns.  Thank you for the opportunity to participate in Mr. Nicholson's care.      Ayan Laguna  Transplant Surgery Department  Fellow  [phone]    I have reviewed history, examined patient and discussed plan with the fellow/resident/EARL.  I concur with the findings in this note.       Patient was counseled on complications of incision and short and long term care to minimize infection/thrombosis risk.      TT: 15 min  CT: 10 min    .    Calin Cheney MD

## 2019-06-17 NOTE — PROGRESS NOTES
Chief Complaint   Patient presents with     RECHECK     F/u for graft     Blood pressure 116/68, pulse 89, weight 76 kg (167 lb 9.6 oz), SpO2 100 %.    Roxana Urbina, CMA

## 2019-06-18 ENCOUNTER — TELEPHONE (OUTPATIENT)
Dept: TRANSPLANT | Facility: CLINIC | Age: 64
End: 2019-06-18

## 2019-06-18 NOTE — TELEPHONE ENCOUNTER
[place] called [facility] and spoke with the charge RN regarding OK to use patient's LUE AV graft with 2 smaller needles (16G) per new AV graft cannulation protocol. May call writer to schedule CVC tunneled line removal once fistula needles advanced to 15 G without issues. Dialysis RN verbalized acceptance and understanding of plan.

## 2019-06-20 ENCOUNTER — TELEPHONE (OUTPATIENT)
Dept: TRANSPLANT | Facility: CLINIC | Age: 64
End: 2019-06-20

## 2019-06-20 NOTE — TELEPHONE ENCOUNTER
Pt reports that he spoke wit his dialysis team  - Epo dose will be increased per renal MD.   Update appreciated

## 2019-06-24 DIAGNOSIS — Z94.1 HEART REPLACED BY TRANSPLANT (H): Primary | ICD-10-CM

## 2019-06-24 RX ORDER — POTASSIUM CHLORIDE 1500 MG/1
20 TABLET, EXTENDED RELEASE ORAL
Status: CANCELLED | OUTPATIENT
Start: 2019-06-24

## 2019-06-24 RX ORDER — POTASSIUM CHLORIDE 1500 MG/1
40 TABLET, EXTENDED RELEASE ORAL
Status: CANCELLED | OUTPATIENT
Start: 2019-06-24

## 2019-06-24 RX ORDER — SODIUM CHLORIDE 9 MG/ML
INJECTION, SOLUTION INTRAVENOUS CONTINUOUS
Status: CANCELLED | OUTPATIENT
Start: 2019-06-24

## 2019-06-26 DIAGNOSIS — Z94.1 HEART TRANSPLANT RECIPIENT (H): ICD-10-CM

## 2019-06-26 LAB
ANION GAP SERPL CALCULATED.3IONS-SCNC: 8 MMOL/L (ref 3–14)
BUN SERPL-MCNC: 28 MG/DL (ref 7–30)
CALCIUM SERPL-MCNC: 8.7 MG/DL (ref 8.5–10.1)
CHLORIDE SERPL-SCNC: 100 MMOL/L (ref 94–109)
CO2 SERPL-SCNC: 26 MMOL/L (ref 20–32)
CREAT SERPL-MCNC: 5.17 MG/DL (ref 0.66–1.25)
ERYTHROCYTE [DISTWIDTH] IN BLOOD BY AUTOMATED COUNT: 18 % (ref 10–15)
GFR SERPL CREATININE-BSD FRML MDRD: 11 ML/MIN/{1.73_M2}
GLUCOSE SERPL-MCNC: 164 MG/DL (ref 70–99)
HCT VFR BLD AUTO: 23.2 % (ref 40–53)
HGB BLD-MCNC: 7.1 G/DL (ref 13.3–17.7)
MCH RBC QN AUTO: 31.3 PG (ref 26.5–33)
MCHC RBC AUTO-ENTMCNC: 30.6 G/DL (ref 31.5–36.5)
MCV RBC AUTO: 102 FL (ref 78–100)
PLATELET # BLD AUTO: 216 10E9/L (ref 150–450)
POTASSIUM SERPL-SCNC: 4.1 MMOL/L (ref 3.4–5.3)
RBC # BLD AUTO: 2.27 10E12/L (ref 4.4–5.9)
SODIUM SERPL-SCNC: 134 MMOL/L (ref 133–144)
TACROLIMUS BLD-MCNC: 7.8 UG/L (ref 5–15)
TME LAST DOSE: 2015 H
WBC # BLD AUTO: 3 10E9/L (ref 4–11)

## 2019-06-26 PROCEDURE — 80197 ASSAY OF TACROLIMUS: CPT | Performed by: INTERNAL MEDICINE

## 2019-06-27 NOTE — RESULT ENCOUNTER NOTE
Lab reviewed with pt.  Hgb 7.1 - was 7. Pt states his Epo has been increased to 10,000 U. He will get another dose today.  FK level 7.8; goal 6-8 - no dose change.   Pt scheduled for angiogram 6/27

## 2019-06-28 ENCOUNTER — TELEPHONE (OUTPATIENT)
Dept: TRANSPLANT | Facility: CLINIC | Age: 64
End: 2019-06-28

## 2019-06-28 ENCOUNTER — APPOINTMENT (OUTPATIENT)
Dept: MEDSURG UNIT | Facility: CLINIC | Age: 64
End: 2019-06-28
Attending: INTERNAL MEDICINE
Payer: MEDICARE

## 2019-06-28 ENCOUNTER — HOSPITAL ENCOUNTER (OUTPATIENT)
Facility: CLINIC | Age: 64
Discharge: HOME | End: 2019-06-28
Attending: INTERNAL MEDICINE | Admitting: INTERNAL MEDICINE
Payer: MEDICARE

## 2019-06-28 VITALS
SYSTOLIC BLOOD PRESSURE: 136 MMHG | HEIGHT: 68 IN | WEIGHT: 162 LBS | DIASTOLIC BLOOD PRESSURE: 80 MMHG | TEMPERATURE: 97.6 F | HEART RATE: 76 BPM | BODY MASS INDEX: 24.55 KG/M2 | RESPIRATION RATE: 14 BRPM | OXYGEN SATURATION: 99 %

## 2019-06-28 DIAGNOSIS — Z94.1 HEART REPLACED BY TRANSPLANT (H): ICD-10-CM

## 2019-06-28 DIAGNOSIS — I25.811 ATHEROSCLEROSIS OF NATIVE CORONARY ARTERY OF TRANSPLANTED HEART WITHOUT ANGINA PECTORIS: ICD-10-CM

## 2019-06-28 DIAGNOSIS — I25.811 ATHEROSCLEROSIS OF CORONARY ARTERY OF TRANSPLANTED HEART WITHOUT ANGINA PECTORIS, UNSPECIFIED VESSEL OR LESION TYPE: ICD-10-CM

## 2019-06-28 LAB
KCT BLD-ACNC: 199 SEC (ref 75–150)
KCT BLD-ACNC: 207 SEC (ref 75–150)

## 2019-06-28 PROCEDURE — 25000128 H RX IP 250 OP 636: Performed by: INTERNAL MEDICINE

## 2019-06-28 PROCEDURE — 25000125 ZZHC RX 250: Performed by: INTERNAL MEDICINE

## 2019-06-28 PROCEDURE — 93005 ELECTROCARDIOGRAM TRACING: CPT

## 2019-06-28 PROCEDURE — 88307 TISSUE EXAM BY PATHOLOGIST: CPT | Performed by: INTERNAL MEDICINE

## 2019-06-28 PROCEDURE — C1893 INTRO/SHEATH, FIXED,NON-PEEL: HCPCS | Performed by: INTERNAL MEDICINE

## 2019-06-28 PROCEDURE — 93454 CORONARY ARTERY ANGIO S&I: CPT | Mod: 26 | Performed by: INTERNAL MEDICINE

## 2019-06-28 PROCEDURE — C1894 INTRO/SHEATH, NON-LASER: HCPCS | Performed by: INTERNAL MEDICINE

## 2019-06-28 PROCEDURE — 99153 MOD SED SAME PHYS/QHP EA: CPT | Performed by: INTERNAL MEDICINE

## 2019-06-28 PROCEDURE — 99152 MOD SED SAME PHYS/QHP 5/>YRS: CPT | Performed by: INTERNAL MEDICINE

## 2019-06-28 PROCEDURE — 25000132 ZZH RX MED GY IP 250 OP 250 PS 637: Mod: GY | Performed by: PHYSICIAN ASSISTANT

## 2019-06-28 PROCEDURE — C1769 GUIDE WIRE: HCPCS | Performed by: INTERNAL MEDICINE

## 2019-06-28 PROCEDURE — 85347 COAGULATION TIME ACTIVATED: CPT | Mod: 91

## 2019-06-28 PROCEDURE — 25000132 ZZH RX MED GY IP 250 OP 250 PS 637: Mod: GY | Performed by: INTERNAL MEDICINE

## 2019-06-28 PROCEDURE — 93505 ENDOMYOCARDIAL BIOPSY: CPT | Mod: XU | Performed by: INTERNAL MEDICINE

## 2019-06-28 PROCEDURE — 27210794 ZZH OR GENERAL SUPPLY STERILE: Performed by: INTERNAL MEDICINE

## 2019-06-28 PROCEDURE — 40000172 ZZH STATISTIC PROCEDURE PREP ONLY

## 2019-06-28 PROCEDURE — 93010 ELECTROCARDIOGRAM REPORT: CPT | Performed by: INTERNAL MEDICINE

## 2019-06-28 PROCEDURE — 93505 ENDOMYOCARDIAL BIOPSY: CPT | Mod: 26 | Performed by: INTERNAL MEDICINE

## 2019-06-28 PROCEDURE — 40000065 ZZH STATISTIC EKG NON-CHARGEABLE

## 2019-06-28 PROCEDURE — 93456 R HRT CORONARY ARTERY ANGIO: CPT | Performed by: INTERNAL MEDICINE

## 2019-06-28 PROCEDURE — C1887 CATHETER, GUIDING: HCPCS | Performed by: INTERNAL MEDICINE

## 2019-06-28 PROCEDURE — 88346 IMFLUOR 1ST 1ANTB STAIN PX: CPT | Performed by: INTERNAL MEDICINE

## 2019-06-28 PROCEDURE — C1760 CLOSURE DEV, VASC: HCPCS | Performed by: INTERNAL MEDICINE

## 2019-06-28 PROCEDURE — 88350 IMFLUOR EA ADDL 1ANTB STN PX: CPT | Performed by: INTERNAL MEDICINE

## 2019-06-28 PROCEDURE — 40000556 ZZH STATISTIC PERIPHERAL IV START W US GUIDANCE

## 2019-06-28 RX ORDER — LIDOCAINE 40 MG/G
CREAM TOPICAL
Status: DISCONTINUED | OUTPATIENT
Start: 2019-06-28 | End: 2019-06-28 | Stop reason: HOSPADM

## 2019-06-28 RX ORDER — ASPIRIN 81 MG/1
81 TABLET ORAL EVERY EVENING
COMMUNITY

## 2019-06-28 RX ORDER — DOPAMINE HYDROCHLORIDE 160 MG/100ML
2-20 INJECTION, SOLUTION INTRAVENOUS CONTINUOUS PRN
Status: DISCONTINUED | OUTPATIENT
Start: 2019-06-28 | End: 2019-06-28 | Stop reason: HOSPADM

## 2019-06-28 RX ORDER — NALOXONE HYDROCHLORIDE 0.4 MG/ML
.2-.4 INJECTION, SOLUTION INTRAMUSCULAR; INTRAVENOUS; SUBCUTANEOUS
Status: DISCONTINUED | OUTPATIENT
Start: 2019-06-28 | End: 2019-06-28 | Stop reason: HOSPADM

## 2019-06-28 RX ORDER — NOREPINEPHRINE BITARTRATE 0.06 MG/ML
.03-.4 INJECTION, SOLUTION INTRAVENOUS CONTINUOUS PRN
Status: DISCONTINUED | OUTPATIENT
Start: 2019-06-28 | End: 2019-06-28 | Stop reason: HOSPADM

## 2019-06-28 RX ORDER — HEPARIN SODIUM 1000 [USP'U]/ML
INJECTION, SOLUTION INTRAVENOUS; SUBCUTANEOUS
Status: DISCONTINUED | OUTPATIENT
Start: 2019-06-28 | End: 2019-06-28 | Stop reason: HOSPADM

## 2019-06-28 RX ORDER — POTASSIUM CHLORIDE 750 MG/1
40 TABLET, EXTENDED RELEASE ORAL
Status: DISCONTINUED | OUTPATIENT
Start: 2019-06-28 | End: 2019-06-28 | Stop reason: HOSPADM

## 2019-06-28 RX ORDER — FENTANYL CITRATE 50 UG/ML
25-50 INJECTION, SOLUTION INTRAMUSCULAR; INTRAVENOUS
Status: DISCONTINUED | OUTPATIENT
Start: 2019-06-28 | End: 2019-06-28 | Stop reason: HOSPADM

## 2019-06-28 RX ORDER — ACETAMINOPHEN 325 MG/1
650 TABLET ORAL EVERY 4 HOURS PRN
Status: DISCONTINUED | OUTPATIENT
Start: 2019-06-28 | End: 2019-06-28 | Stop reason: HOSPADM

## 2019-06-28 RX ORDER — ARGATROBAN 1 MG/ML
150 INJECTION, SOLUTION INTRAVENOUS
Status: DISCONTINUED | OUTPATIENT
Start: 2019-06-28 | End: 2019-06-28 | Stop reason: HOSPADM

## 2019-06-28 RX ORDER — POTASSIUM CHLORIDE 750 MG/1
20 TABLET, EXTENDED RELEASE ORAL
Status: DISCONTINUED | OUTPATIENT
Start: 2019-06-28 | End: 2019-06-28 | Stop reason: HOSPADM

## 2019-06-28 RX ORDER — FENTANYL CITRATE 50 UG/ML
INJECTION, SOLUTION INTRAMUSCULAR; INTRAVENOUS
Status: DISCONTINUED | OUTPATIENT
Start: 2019-06-28 | End: 2019-06-28 | Stop reason: HOSPADM

## 2019-06-28 RX ORDER — DOBUTAMINE HYDROCHLORIDE 200 MG/100ML
2-20 INJECTION INTRAVENOUS CONTINUOUS PRN
Status: DISCONTINUED | OUTPATIENT
Start: 2019-06-28 | End: 2019-06-28 | Stop reason: HOSPADM

## 2019-06-28 RX ORDER — IOPAMIDOL 755 MG/ML
INJECTION, SOLUTION INTRAVASCULAR
Status: DISCONTINUED | OUTPATIENT
Start: 2019-06-28 | End: 2019-06-28 | Stop reason: HOSPADM

## 2019-06-28 RX ORDER — NITROGLYCERIN 20 MG/100ML
.07-2 INJECTION INTRAVENOUS CONTINUOUS PRN
Status: DISCONTINUED | OUTPATIENT
Start: 2019-06-28 | End: 2019-06-28 | Stop reason: HOSPADM

## 2019-06-28 RX ORDER — SODIUM CHLORIDE 9 MG/ML
INJECTION, SOLUTION INTRAVENOUS CONTINUOUS
Status: DISCONTINUED | OUTPATIENT
Start: 2019-06-28 | End: 2019-06-28 | Stop reason: HOSPADM

## 2019-06-28 RX ORDER — FLUMAZENIL 0.1 MG/ML
0.2 INJECTION, SOLUTION INTRAVENOUS
Status: DISCONTINUED | OUTPATIENT
Start: 2019-06-28 | End: 2019-06-28 | Stop reason: HOSPADM

## 2019-06-28 RX ORDER — ATROPINE SULFATE 0.1 MG/ML
0.5 INJECTION INTRAVENOUS EVERY 5 MIN PRN
Status: DISCONTINUED | OUTPATIENT
Start: 2019-06-28 | End: 2019-06-28 | Stop reason: HOSPADM

## 2019-06-28 RX ORDER — ARGATROBAN 1 MG/ML
350 INJECTION, SOLUTION INTRAVENOUS
Status: DISCONTINUED | OUTPATIENT
Start: 2019-06-28 | End: 2019-06-28 | Stop reason: HOSPADM

## 2019-06-28 RX ORDER — NALOXONE HYDROCHLORIDE 0.4 MG/ML
.1-.4 INJECTION, SOLUTION INTRAMUSCULAR; INTRAVENOUS; SUBCUTANEOUS
Status: DISCONTINUED | OUTPATIENT
Start: 2019-06-28 | End: 2019-06-28 | Stop reason: HOSPADM

## 2019-06-28 RX ORDER — TIROFIBAN HYDROCHLORIDE 50 UG/ML
0.07 INJECTION INTRAVENOUS CONTINUOUS PRN
Status: DISCONTINUED | OUTPATIENT
Start: 2019-06-28 | End: 2019-06-28 | Stop reason: HOSPADM

## 2019-06-28 RX ADMIN — ASPIRIN 325 MG ORAL TABLET 325 MG: 325 PILL ORAL at 13:22

## 2019-06-28 RX ADMIN — ACETAMINOPHEN 650 MG: 325 TABLET, FILM COATED ORAL at 18:24

## 2019-06-28 ASSESSMENT — PAIN DESCRIPTION - DESCRIPTORS: DESCRIPTORS: NUMBNESS;TINGLING

## 2019-06-28 ASSESSMENT — MIFFLIN-ST. JEOR: SCORE: 1499.33

## 2019-06-28 NOTE — PROGRESS NOTES
1415  Prep is complete for procedure, except for IV start.  Vascular has been called.  EKG is in chart.  Labs are from 6/26/19.  Hgb is 7.1, CCL is aware.  H&P is current.  Consent is complete.  Murray denies any pain at this time.  Friend, Naresh, will be called for his ride home.  Murray is on hemodialysis on Tue, Thurs, & Sat.  He did have a good run at dialysis yesterday, 6/27/19.  He has a dialysis cath in right upper chest and a fistula in Left Arm.   CTRN

## 2019-06-28 NOTE — Clinical Note
femoral artery Cine(s)  injected and visualized utilizing power injector system. To assess for closure device

## 2019-06-28 NOTE — PROGRESS NOTES
"Patient s/p CORs with significant CAD (LAD & OM1 lesion) but no intervention performed today. Patient to meet with transplant cardiologist as outpatient. Right groin soft, non-tender, band aid CDI with no bleeding or hematoma or bruit noted. Patient off bedrest at 7pm, tolerating regular diet. Voiding without difficulty. Walked hallway and ready for discharge.   /80   Pulse 76   Temp 97.6  F (36.4  C) (Oral)   Resp 14   Ht 1.727 m (5' 8\")   Wt 73.5 kg (162 lb)   SpO2 99%   BMI 24.63 kg/m       "

## 2019-06-28 NOTE — Clinical Note
Perclose utilized for closure of sight.  Manual pressure applied by scrub person; hemostasis achieved.

## 2019-06-28 NOTE — Clinical Note
Potential access sites were evaluated for patency using ultrasound.   The right femoral artery and right femoral vein was selected. Access was obtained under with Sonosite and Fluoroscopic guidance using a standard 18 guage needle with direct visualization of needle entry.

## 2019-06-28 NOTE — DISCHARGE INSTRUCTIONS
Going Home after Coronary Angiogram    - There is some plaque in your arteries  - At your next appointment with Dr. Keen, you will discuss the management options and plan for this  - If you have an chest pain, we would need to know about this right away as this would change the plan  - If you have chest pain that is persistent or severe, please go to an ER  - Please continue taking all of your cardiac medications as you are     FOR 24 HOURS:         Have an adult stay with you for 24 hours.         Relax and take it easy.         Drink plenty of fluids.         Do NOT make any important or legal decisions.         Do NOT drive or operate machines at home or at work.         Do NOT drink alcohol.     PROCEDURE SITE:  Care of groin site:         Remove the Band-Aid after 24 hours. If there is minor oozing, apply another Band-aid and remove it after 12 hours.          Do NOT take a bath, or use a hot tub or pool for at least 3 days. You may shower.          It is normal to have a small bruise or lump at the site.         Do not scrub the site.         Do not use lotion or powder near the puncture site for 3 days.         For the first 2 days: Do not stoop or squat. When you cough, sneeze or move your bowels, hold your hand over the puncture site and press gently.         Do not lift more than 10 pounds for at least 3 to 5 days.         For 2 days, do NOT have sex or do any heavy exercise.     If you start bleeding from the site in your groin:  Lie down flat and press firmly on the site.  Call your physician immediately, or, come to the emergency room.    Call 911 right away if you have bleeding that is heavy or does not stop.      MEDICATIONS:  1. If you are on metformin (Glucophage), do not restart it until you have blood tests (within 2 to 3 days after discharge). When your doctor tells you it is safe, you may restart the metformin.   2. Please review your medication instructions in your discharge paperwork.  If you have not been continued on other medications you were previously taking at home it is probably for reason though please discuss your concerns with any of your doctors.     DIET:  We recommend a diet low in saturated fat, trans fat and cholesterol. In addition it will be helpful to be cautious of sodium intake, sugar and carbohydrates. Try to increase the amount of lean meats you eat like fish and chicken, but avoid frying; and reduce the amount of red meat you eat. Eat more fresh fruits and vegetables and try to avoid canned and processed food. Please reference the handouts you received for more specific information.    OTHER INFORMATION:  1. Consider having your family members learn CPR if they do not know it already.  2. If you are a smoker, quitting smoking will be one of the most important things you can do for yourself. There are nicotine replacement options or medications they might be able to be prescribed. Please discuss this with your doctors. Consider calling the [employer]uit[employer] at [phone] ([phone]) as they can offer ongoing support after discharge. ***    CALL YOUR DOCTOR IF:  -You have a large or growing lump/bump around the procedure site  -The site is red, swollen, hot, tender or has drainage  -You have hives, a rash or unusual itching  -You have increasing or worsening shortness of breath or chest pain    FOLLOW UP:  We prefer you to follow up with your primary care provider within one week. You will be arranged to see the cardiologist (heart doctor) in clinic in about one month.     Should you need to contact us:  Cardiology clinic for scheduling or triage nurse questions/concerns:  [phone]

## 2019-06-28 NOTE — PROGRESS NOTES
Report received from TONY Dasilva in CCL. Right groin site, RFA 6 fr pulled and holding pressure, in process of pulling RFV. Patient will have 2 hr bedrest once hemostasis achieved. Patient was found to have significant CAD of LAD & OM1 leision, already spoke with Cardiologist in CCL and will need to meet with transplant cardiologist (Dr. Ernst) as outpatient.

## 2019-07-01 ENCOUNTER — TELEPHONE (OUTPATIENT)
Dept: TRANSPLANT | Facility: CLINIC | Age: 64
End: 2019-07-01

## 2019-07-01 LAB — COPATH REPORT: NORMAL

## 2019-07-01 NOTE — TELEPHONE ENCOUNTER
Ana Luisa Mcpherson MD Kao, VERONICA Powell CNS             Sum, Murray has had carpal tunnel sx that were getting better with brace but now has worsening tingling in access hand.  Can you check on this?  Thanks!      Writer called [facility] and spoke with the charge RN regarding above issues. She reported that she has no knowledge about the patient C/O.  Writer called the patient and left VM to call back.

## 2019-07-02 ENCOUNTER — PRE VISIT (OUTPATIENT)
Dept: TRANSPLANT | Facility: CLINIC | Age: 64
End: 2019-07-02

## 2019-07-02 LAB — INTERPRETATION ECG - MUSE: NORMAL

## 2019-07-05 ENCOUNTER — TELEPHONE (OUTPATIENT)
Dept: TRANSPLANT | Facility: CLINIC | Age: 64
End: 2019-07-05

## 2019-07-05 NOTE — TELEPHONE ENCOUNTER
received VM from patient yesterday C/O LUE AV graft moves during cannulation and pull blood clots from AVG graft. Writer called him and left VM to return call this morning.  Writer called [facility] dialysis charge RN regarding the above issues. She passed the phone to a PCT who does the most cannulations of the patient s AV graft. The PCT reports that AV graft has positive thrill and bruit prior to each cannulations, however, the AV graft rolls to side to side and she needs to use a tourniquet to stabilize the AV graft.  Writer recommended  the PCT should not use a tourniquet to stabilize any AV graft, she should use her fingers to hold the surrounding area of AV graft to stabilize the position. In terms of pulling blood clots from needles, this is indicated the needles is not in the center of AV graft when cannulation.   The PCT argued with this writer that she cannot touch the AV graft after disinfectant applied to the skin area, even  the surrounding area of AV graft to stabilize the position per [facility] policy. She seems to be not accepting writer recommendations.   Writer sent the NKF KDOQI GUIDELINES to Dr. Mcpherson and [facility] group FA to share with [facility] dialysis staff on proper cannulation technique.  NKF KDOQI GUIDELINES also faxed to [facility] dialysis

## 2019-07-08 ENCOUNTER — OFFICE VISIT (OUTPATIENT)
Dept: CARDIOLOGY | Facility: CLINIC | Age: 64
End: 2019-07-08
Attending: INTERNAL MEDICINE
Payer: MEDICARE

## 2019-07-08 VITALS
DIASTOLIC BLOOD PRESSURE: 63 MMHG | BODY MASS INDEX: 24.88 KG/M2 | HEART RATE: 88 BPM | SYSTOLIC BLOOD PRESSURE: 104 MMHG | HEIGHT: 69 IN | WEIGHT: 168 LBS | OXYGEN SATURATION: 100 %

## 2019-07-08 DIAGNOSIS — I10 ESSENTIAL HYPERTENSION: ICD-10-CM

## 2019-07-08 DIAGNOSIS — N18.5 CKD (CHRONIC KIDNEY DISEASE) STAGE 5, GFR LESS THAN 15 ML/MIN (H): Primary | ICD-10-CM

## 2019-07-08 DIAGNOSIS — Z94.1 HEART REPLACED BY TRANSPLANT (H): ICD-10-CM

## 2019-07-08 PROCEDURE — G0463 HOSPITAL OUTPT CLINIC VISIT: HCPCS | Mod: ZF

## 2019-07-08 PROCEDURE — 99215 OFFICE O/P EST HI 40 MIN: CPT | Mod: ZP | Performed by: INTERNAL MEDICINE

## 2019-07-08 RX ORDER — CLONIDINE HYDROCHLORIDE 0.1 MG/1
TABLET ORAL
Qty: 48 TABLET | Refills: 3 | Status: SHIPPED | OUTPATIENT
Start: 2019-07-08 | End: 2019-08-14

## 2019-07-08 ASSESSMENT — MIFFLIN-ST. JEOR: SCORE: 1538.45

## 2019-07-08 ASSESSMENT — PAIN SCALES - GENERAL: PAINLEVEL: NO PAIN (0)

## 2019-07-08 NOTE — LETTER
2019      RE: Murray Nicholson  [address]  [place] [address]       2019    Yahir Turcios MD    [facility]    [address] [place]  [address]       Ana Luisa Mcpherson MD    [facility]    [address] [facility] [address]    [place]  [address]       RE: Murray Nicholson   MRN: [id number]   : 1955      Dear Dr. Turcios and Dr. Mcpherson:      We had the pleasure of seeing Mr. Murray Nicholson for followup in our Cardiac Transplant Clinic at the [facility].  As you know, he is a very pleasant 64-year-old male who underwent orthotopic heart transplantation on 2018.  He is currently listed for kidney transplantation.  He had a very complex postoperative course.  His current problem list includes:     1.  Status post orthotopic heart transplantation.   2.  Neutropenia.   3.  Oral mucosal ulcer secondary to neutropenia with Klebsiella infection.   4.  Pseudomonas bacteremia, resolved.   5.  Perforation of the small bowel, status post small-bowel resection and ileostomy.   6.  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   7.  Hypertension.   8.  Hyperlipidemia.   9.  End-stage renal disease requiring hemodialysis.      He returns today for his 12 month follow up.  He is complaining of dizziness on and off, which resolves with increase p.o. fluid intake.  He also feels tired after his dialysis.  He had a bovine graft placed couple of weeks ago.  He has had issues with good flow from his bovine graft for his dialysis.  He intermittently still gets dialyzed through his Rosales catheter.      He has no chest pain or chest pressure at rest or with exertion.  No exertional shortness of breath.  No lightheadedness, dizziness or syncope.  No lower extremity swelling.  No fever or chills.  All other systems reviewed and are negative.       Current Outpatient Medications   Medication Sig     acetaminophen (TYLENOL) 500 MG tablet Take 2 tablets (1,000  "mg) by mouth 4 times daily     amLODIPine (NORVASC) 10 MG tablet Take 1 tablet (10 mg) by mouth daily     aspirin 81 MG EC tablet Take 81 mg by mouth daily     atorvastatin (LIPITOR) 40 MG tablet Take 1 tablet (40 mg) by mouth daily     biotin (BIOTIN 5000) 5 MG CAPS Take 5 mg by mouth daily     cloNIDine (CATAPRES) 0.1 MG tablet Take four days weekly; non-dialysis     clopidogrel (PLAVIX) 75 MG tablet Take 1 tablet (75 mg) by mouth daily     hydrALAZINE (APRESOLINE) 100 MG TABS tablet Take 1 tablet (100 mg) by mouth every 8 hours     lisinopril (PRINIVIL/ZESTRIL) 10 MG tablet Take 1 tablet (10 mg) by mouth daily     loratadine (CLARITIN) 10 MG tablet Take 10 mg by mouth daily as needed Reported on 5/3/2017     melatonin 1 MG TABS tablet Take 2 tablets (2 mg) by mouth nightly as needed for sleep     mycophenolate (GENERIC EQUIVALENT) 250 MG capsule Take 3 capsules (750 mg) by mouth 2 times daily     NEPHROCAPS 1 MG capsule Take 1 capsule by mouth daily     order for DME Coloplast       1 piece convex light Uriel cut up to 1  \" with filter #[id number]       Or  Eitzen         1 piece soft convex 2 1/8\" #[id number]        Accessories   2\" barrier ring #[id number]                           Adapt paste #[id number]                           Adapt powder #7906                          No sting film barrier # [id number]                          Brava elastic barrier strips curved # [id number]    Treatment Diagnosis: New ileostomy     pantoprazole (PROTONIX) 40 MG EC tablet Take 1 tablet (40 mg) by mouth 2 times daily (before meals)     tacrolimus (GENERIC EQUIVALENT) 1 MG capsule Take 3 capsules (3 mg) by mouth 2 times daily     traMADol (ULTRAM) 50 MG tablet Take 50 mg by mouth every 6 hours as needed for severe pain     traMADol (ULTRAM) 50 MG tablet Take 0.5-1 tablets (25-50 mg) by mouth every 6 hours as needed for severe pain (Patient not taking: Reported on 7/8/2019)     No current facility-administered medications for this visit.  " "    Facility-Administered Medications Ordered in Other Visits   Medication     diatrizoate meglumine-sodium (GASTROGRAFIN/GASTROVIEW) 66-10 % solution 480 mL        REVIEW OF SYSTEMS:  A detailed 10-point review of systems was obtained as described in History of Present Illness.  All other systems are reviewed and are negative.      PHYSICAL EXAMINATION:   GENERAL:  He was comfortable.  He was in no apparent distress.   /63 (BP Location: Right arm, Patient Position: Chair, Cuff Size: Adult Regular)   Pulse 88   Ht 1.746 m (5' 8.75\")   Wt 76.2 kg (168 lb)   SpO2 100%   BMI 24.99 kg/m     He had no pallor or cyanosis or jaundice.  The lesion on his hard palate is healing nicely.  Cardiac auscultation revealed normal S1-S2 with no murmur rub or gallop.  Auscultation of his lungs reveal equal air entry on both sides with no added sounds.  His abdomen was soft with no wounds and no tenderness no rigidity no guarding.  Colostomy site looks good.  He had no focal neurological deficit.  His extremities show no edema.    Testing:     Echo (06/2019):  Normal biventricular function, chamber size, wall motion, and wall  thickness.The EF is 65-70%.  Normal RV size and function.  No hemodynamically significant valvular anomalies.  The inferior vena cava was normal in size with preserved respiratory  variability. Estimated mean right atrial pressure is 3 mmHg (normal).  No pericardial effusion is present.     This study was compared with the study from 12/3/2018. There has been no  Change.    RHC (06/2018):   RA 9  RV 38/9  PA 35/16 (24)  PCWP 14  PA % 71.5%  CO/CI 9.3/4.99  PVR 1.07    Coronary Angiogram (06/2019):  Left Anterior Descending   Prox LAD lesion is 40% stenosed.   First Diagonal Branch   The vessel is small.   Second Diagonal Branch   The vessel is moderate in size.   Third Diagonal Branch   The vessel is small.   Left Circumflex   First Obtuse Marginal Branch   The vessel is moderate in size.   1st Mrg " lesion is 50% stenosed.   Second Obtuse Marginal Branch   The vessel is moderate in size.   2nd Mrg-1 lesion is 80% stenosed.   2nd Mrg-2 lesion is 80% stenosed.   Third Obtuse Marginal Branch   The vessel is moderate in size.   Right Coronary Artery   Prox RCA lesion is 20% stenosed.   Dist RCA lesion is 40% stenosed.   Right Posterior Descending Artery   RPDA lesion is 20% stenosed.         Biopsy (12/2018):  Acute cellular rejection: No rejection, Grade 0R (ISHLT 2004)   - Antibody-mediated rejection: No evidence of antibody-mediated rejection   - C3d and C4d are negative (see comment)   DSA:     Lab Results   Component Value Date    SAITESTMET [facility] 06/07/2019    SAICELL Class I 06/07/2019    [id number] None 06/07/2019    [id number] None 06/07/2019    SAIREPCOM  06/07/2019      Test performed by modified procedure. Serum heat inactivated and tested   by a modified (Bethany) protocol including fetal calf serum addition.   High-risk, mfi >3,000. Mod-risk, mfi 500-3,000.       Lab Results   Component Value Date    SAIITESTME [facility] 06/07/2019    SAIICELL Class II 06/07/2019    [id number] None 06/07/2019    [id number] None 06/07/2019    SAIIREPCOM  06/07/2019      Test performed by modified procedure. Serum heat inactivated and tested   by a modified (Bethany) protocol including fetal calf serum addition.   High-risk, mfi >3,000. Mod-risk, mfi 500-3,000.         IMMUNOSUPPRESSANT LEVELS:  Lab Results   Component Value Date    TACROL 7.8 06/26/2019    DOSTAC 2,015 06/26/2019       Lab Results   Component Value Date    CSPEC Plasma, EDTA anticoagulant 06/07/2019       CBC RESULTS:   CBC RESULTS:   Recent Labs   Lab Test 06/26/19  0821   WBC 3.0*   RBC 2.27*   HGB 7.1*   HCT 23.2*   *   MCH 31.3   MCHC 30.6*   RDW 18.0*        Recent Labs   Lab Test 06/26/19  0821 06/07/19  0850    135   POTASSIUM 4.1 4.2   CHLORIDE 100 99   CO2 26 27   ANIONGAP 8 8   * 107*   BUN 28 27   CR 5.17* 5.16*   TRINI 8.7  9.1     Liver Function Studies -   Recent Labs   Lab Test 06/07/19  0850   PROTTOTAL 7.0   ALBUMIN 3.4   BILITOTAL 1.1   ALKPHOS 278*   AST 14   ALT 19       Lab Results   Component Value Date    A1C 5.2 06/10/2019     Lab Results   Component Value Date    PHOS 3.0 06/07/2019     PSA   Date Value Ref Range Status   06/07/2019 1.40 0 - 4 ug/L Final     Comment:     Assay Method:  Chemiluminescence using Siemens Vista analyzer       CMV PCR - negative  EBV - Negative.     ASSESSMENT AND PLAN:       Mr. Murray Nicholson is a 64-year-old male status post orthotopic heart transplantation in June with a very complex postoperative course who returns today for followup.     1.  Orthotopic heart transplantation.      Mr. Nicholson has normal graft function.    He has not had any evidence of rejection  No DSA  Moderate donor coronary artery disease with no evidence of progression to suggest allograft vasculopathy.     FK goal of 6-8 and Cellcept 750 mg bid. Immunknow level ok.   Will continue aspirin and Lipitor for coronary artery disease. Lipid profile is normal. DM is under control. He does not smoke. Repeat coronary angiogram in a year or sooner if he has any symptoms or worsenign graft function.    2.  Hypertension.  His blood pressure is currently controlled on hydralazine 100 mg 3 times a day, lisinopril 5 mg, Norvasc 10 mg, and Clonidine 0.1 mg daily. In fact, he occasional has low blood pressure. Will try to wean his clonidine - decrease it to 0.1 mg every other day for 4 weeks, twice a week for two weeks, and then once a week for two weeks.     3.  End-stage renal disease.  He continues to remain on dialysis 3 times a week.  He has a bovine AV graft. Will check with nephrology regarding the need and removal of his Rosales catheter.     4.  Neutropenia - resolved. On low-dose of CellCept.      5.  High risk for CMV.  Will check CMV as per protocol. He has completed 6 months of close survelliance.     6.  Oral ulcer with  Klebsiella - Healed.      7.  Perforated bowel - status post colostomy and reversal of ostomy.     8.  Diabetes - under better control. Off insulin.     9. Anemia - likely related to CKD. His epogen dose was recently increased. Will continue to monitor.      RTC as per protocol in 4 months with biopsy. Will continue to do biopsy given his high allomap score. . He will call in the interim if he has any worsening symptoms.       Sincerely,   Klever Ernst MD   [facility]  Heart Failure, Transplant, and Mechanical Circulatory Support Cardiology   Cardiovascular Division  [facility]   [phone]           Klever Ernst MD

## 2019-07-08 NOTE — NURSING NOTE
Vitals completed successfully and medication reconciled.     Julita Cagle, REA  9:54 AM  Chief Complaint   Patient presents with     Follow Up     heart transplant return

## 2019-07-08 NOTE — LETTER
2019      RE: Murray Nicholson  [address]  [place] [address]       Dear Colleague,    Thank you for the opportunity to participate in the care of your patient, Murray Nicholson, at the [facility] HEART [facility]. Please see a copy of my visit note below.    2019    Yahir Turcios MD    [facility]    [address] [place] [address]  [address]       Ana Luisa Mcpherson MD    [facility]    [address] [place] [address], [facility] [address]    [place]  [address]       RE: Murray Nicholson   MRN: [id number]   : 1955      Dear Dr. Turcios and Dr. Mcpherson:      We had the pleasure of seeing Mr. Murray Nicholson for followup in our Cardiac Transplant Clinic at the [facility].  As you know, he is a very pleasant 64-year-old male who underwent orthotopic heart transplantation on 2018.  He is currently listed for kidney transplantation.  He had a very complex postoperative course.  His current problem list includes:     1.  Status post orthotopic heart transplantation.   2.  Neutropenia.   3.  Oral mucosal ulcer secondary to neutropenia with Klebsiella infection.   4.  Pseudomonas bacteremia, resolved.   5.  Perforation of the small bowel, status post small-bowel resection and ileostomy.   6.  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   7.  Hypertension.   8.  Hyperlipidemia.   9.  End-stage renal disease requiring hemodialysis.      He returns today for his 12 month follow up.  He is complaining of dizziness on and off, which resolves with increase p.o. fluid intake.  He also feels tired after his dialysis.  He had a bovine graft placed couple of weeks ago.  He has had issues with good flow from his bovine graft for his dialysis.  He intermittently still gets dialyzed through his Rosales catheter.      He has no chest pain or chest pressure at rest or with exertion.  No exertional shortness of breath.  No lightheadedness,  "dizziness or syncope.  No lower extremity swelling.  No fever or chills.  All other systems reviewed and are negative.       Current Outpatient Medications   Medication Sig     acetaminophen (TYLENOL) 500 MG tablet Take 2 tablets (1,000 mg) by mouth 4 times daily     amLODIPine (NORVASC) 10 MG tablet Take 1 tablet (10 mg) by mouth daily     aspirin 81 MG EC tablet Take 81 mg by mouth daily     atorvastatin (LIPITOR) 40 MG tablet Take 1 tablet (40 mg) by mouth daily     biotin (BIOTIN 5000) 5 MG CAPS Take 5 mg by mouth daily     cloNIDine (CATAPRES) 0.1 MG tablet Take four days weekly; non-dialysis     clopidogrel (PLAVIX) 75 MG tablet Take 1 tablet (75 mg) by mouth daily     hydrALAZINE (APRESOLINE) 100 MG TABS tablet Take 1 tablet (100 mg) by mouth every 8 hours     lisinopril (PRINIVIL/ZESTRIL) 10 MG tablet Take 1 tablet (10 mg) by mouth daily     loratadine (CLARITIN) 10 MG tablet Take 10 mg by mouth daily as needed Reported on 5/3/2017     melatonin 1 MG TABS tablet Take 2 tablets (2 mg) by mouth nightly as needed for sleep     mycophenolate (GENERIC EQUIVALENT) 250 MG capsule Take 3 capsules (750 mg) by mouth 2 times daily     NEPHROCAPS 1 MG capsule Take 1 capsule by mouth daily     order for DME Coloplast       1 piece convex light Uriel cut up to 1  \" with filter #[id number]       Or  Shashi         1 piece soft convex 2 1/8\" #[id number]        Accessories   2\" barrier ring #[id number]                           Adapt paste #[id number]                           Adapt powder #[id number]                          No sting film barrier # [id number]                          Brava elastic barrier strips curved # [id number]    Treatment Diagnosis: New ileostomy     pantoprazole (PROTONIX) 40 MG EC tablet Take 1 tablet (40 mg) by mouth 2 times daily (before meals)     tacrolimus (GENERIC EQUIVALENT) 1 MG capsule Take 3 capsules (3 mg) by mouth 2 times daily     traMADol (ULTRAM) 50 MG tablet Take 50 mg by mouth every 6 hours as needed for severe pain " "    traMADol (ULTRAM) 50 MG tablet Take 0.5-1 tablets (25-50 mg) by mouth every 6 hours as needed for severe pain (Patient not taking: Reported on 7/8/2019)     No current facility-administered medications for this visit.      Facility-Administered Medications Ordered in Other Visits   Medication     diatrizoate meglumine-sodium (GASTROGRAFIN/GASTROVIEW) 66-10 % solution 480 mL        REVIEW OF SYSTEMS:  A detailed 10-point review of systems was obtained as described in History of Present Illness.  All other systems are reviewed and are negative.      PHYSICAL EXAMINATION:   GENERAL:  He was comfortable.  He was in no apparent distress.   /63 (BP Location: Right arm, Patient Position: Chair, Cuff Size: Adult Regular)   Pulse 88   Ht 1.746 m (5' 8.75\")   Wt 76.2 kg (168 lb)   SpO2 100%   BMI 24.99 kg/m     He had no pallor or cyanosis or jaundice.  The lesion on his hard palate is healing nicely.  Cardiac auscultation revealed normal S1-S2 with no murmur rub or gallop.  Auscultation of his lungs reveal equal air entry on both sides with no added sounds.  His abdomen was soft with no wounds and no tenderness no rigidity no guarding.  Colostomy site looks good.  He had no focal neurological deficit.  His extremities show no edema.    Testing:     Echo (06/2019):  Normal biventricular function, chamber size, wall motion, and wall  thickness.The EF is 65-70%.  Normal RV size and function.  No hemodynamically significant valvular anomalies.  The inferior vena cava was normal in size with preserved respiratory  variability. Estimated mean right atrial pressure is 3 mmHg (normal).  No pericardial effusion is present.     This study was compared with the study from 12/3/2018. There has been no  Change.    RHC (06/2018):   RA 9  RV 38/9  PA 35/16 (24)  PCWP 14  PA % 71.5%  CO/CI 9.3/4.99  PVR 1.07    Coronary Angiogram (06/2019):  Left Anterior Descending   Prox LAD lesion is 40% stenosed.   First Diagonal Branch "   The vessel is small.   Second Diagonal Branch   The vessel is moderate in size.   Third Diagonal Branch   The vessel is small.   Left Circumflex   First Obtuse Marginal Branch   The vessel is moderate in size.   1st Mrg lesion is 50% stenosed.   Second Obtuse Marginal Branch   The vessel is moderate in size.   2nd Mrg-1 lesion is 80% stenosed.   2nd Mrg-2 lesion is 80% stenosed.   Third Obtuse Marginal Branch   The vessel is moderate in size.   Right Coronary Artery   Prox RCA lesion is 20% stenosed.   Dist RCA lesion is 40% stenosed.   Right Posterior Descending Artery   RPDA lesion is 20% stenosed.         Biopsy (12/2018):  Acute cellular rejection: No rejection, Grade 0R (ISHLT 2004)   - Antibody-mediated rejection: No evidence of antibody-mediated rejection   - C3d and C4d are negative (see comment)   DSA:     Lab Results   Component Value Date    SAITESTMET [facility] 06/07/2019    SAICELL Class I 06/07/2019    [id number] None 06/07/2019    [id number] None 06/07/2019    SAIREPCOM  06/07/2019      Test performed by modified procedure. Serum heat inactivated and tested   by a modified ([place]) protocol including fetal calf serum addition.   High-risk, mfi >3,000. Mod-risk, mfi 500-3,000.       Lab Results   Component Value Date    SAIITESTME [facility] 06/07/2019    SAIICELL Class II 06/07/2019    [id number] None 06/07/2019    [id number] None 06/07/2019    SAIIREPCOM  06/07/2019      Test performed by modified procedure. Serum heat inactivated and tested   by a modified ([place]) protocol including fetal calf serum addition.   High-risk, mfi >3,000. Mod-risk, mfi 500-3,000.         IMMUNOSUPPRESSANT LEVELS:  Lab Results   Component Value Date    TACROL 7.8 06/26/2019    DOSTAC 2,015 06/26/2019       Lab Results   Component Value Date    CSPEC Plasma, EDTA anticoagulant 06/07/2019       CBC RESULTS:   CBC RESULTS:   Recent Labs   Lab Test 06/26/19  0821   WBC 3.0*   RBC 2.27*   HGB 7.1*   HCT 23.2*   *   MCH 31.3    MCHC 30.6*   RDW 18.0*        Recent Labs   Lab Test 06/26/19  0821 06/07/19  0850    135   POTASSIUM 4.1 4.2   CHLORIDE 100 99   CO2 26 27   ANIONGAP 8 8   * 107*   BUN 28 27   CR 5.17* 5.16*   TRINI 8.7 9.1     Liver Function Studies -   Recent Labs   Lab Test 06/07/19  0850   PROTTOTAL 7.0   ALBUMIN 3.4   BILITOTAL 1.1   ALKPHOS 278*   AST 14   ALT 19       Lab Results   Component Value Date    A1C 5.2 06/10/2019     Lab Results   Component Value Date    PHOS 3.0 06/07/2019     PSA   Date Value Ref Range Status   06/07/2019 1.40 0 - 4 ug/L Final     Comment:     Assay Method:  Chemiluminescence using Siemens Vista analyzer       CMV PCR - negative  EBV - Negative.     ASSESSMENT AND PLAN:       Mr. Murray Nicholson is a 64-year-old male status post orthotopic heart transplantation in June with a very complex postoperative course who returns today for followup.     1.  Orthotopic heart transplantation.      Mr. Nicholson has normal graft function.    He has not had any evidence of rejection  No DSA  Moderate donor coronary artery disease with no evidence of progression to suggest allograft vasculopathy.     FK goal of 6-8 and Cellcept 750 mg bid. Immunknow level ok.   Will continue aspirin and Lipitor for coronary artery disease. Lipid profile is normal. DM is under control. He does not smoke. Repeat coronary angiogram in a year or sooner if he has any symptoms or worsenign graft function.    2.  Hypertension.  His blood pressure is currently controlled on hydralazine 100 mg 3 times a day, lisinopril 5 mg, Norvasc 10 mg, and Clonidine 0.1 mg daily. In fact, he occasional has low blood pressure. Will try to wean his clonidine - decrease it to 0.1 mg every other day for 4 weeks, twice a week for two weeks, and then once a week for two weeks.     3.  End-stage renal disease.  He continues to remain on dialysis 3 times a week.  He has a bovine AV graft. Will check with nephrology regarding the need and  removal of his Rosales catheter.     4.  Neutropenia - resolved. On low-dose of CellCept.      5.  High risk for CMV.  Will check CMV as per protocol. He has completed 6 months of close survelliance.     6.  Oral ulcer with Klebsiella - Healed.      7.  Perforated bowel - status post colostomy and reversal of ostomy.     8.  Diabetes - under better control. Off insulin.     9. Anemia - likely related to CKD. His epogen dose was recently increased. Will continue to monitor.      RTC as per protocol in 4 months with biopsy. Will continue to do biopsy given his high allomap score. . He will call in the interim if he has any worsening symptoms.       Sincerely,   Klever Ernst MD   [facility]  Heart Failure, Transplant, and Mechanical Circulatory Support Cardiology   Cardiovascular Division  [facility]   [phone]

## 2019-07-08 NOTE — PATIENT INSTRUCTIONS
Please call your coordinator at [phone] with any questions or concerns.      Decrease the Clonidine to 4 days per week non-dialysis days only. Update coordinator with BP readings.    Return in Oct for labs, biopsy and clinic visit.

## 2019-07-08 NOTE — NURSING NOTE
Transplant Coordinator Note  Reason for visit: Follow up testing - 1st annual post heart transplant  Coordinator: Present Lillie Lopesyared  Caregiver:  NA     Health concerns addressed today:  Pt seen in clinic Dr Ernst. MD reviewed 1st annual testing, meds and labs. BP meds adjusted based low readings, lightheadedness, and meds held by pt on dialysis days. +ankle swelling after two days without dialysis.  Will attempt to wean down Clonidine. MD reviewed one month and one year post angiograms, per MD, disease stable.  Repeat angiogram with IVUS at 2nd annual. Overall wght stable; pt feeling well except for tiredness on dialysis days. Per pt EPO increased at dialysis - hgb 7.1. Remains off insulin; conts to check BS. Has a neighbor who is considering being a living kidney donor - message sent to renal tx team to assess when pt might be activated.   Due to Allomaps >34 - plan for labs, biopsy and clinic for one year 4 month visit.      Immunosuppressants:   mg BID  FK 3/3 mg - goal 6-8; level 7.8  No DSAs  Immuknow 258 (mod)  Allomaps 34, 27, 38, 37     Routine screenings:    Derm: July 2018 - DUE  Dental: DUE  Colonoscopy:  Prostate: PSA 1.4  Eye: DUE  Flu/Pneumonia: Discussed     Labs: Reviewed with pt      Medication record reviewed and reconciled  Questions and concerns addressed. AVS reviewed and copy provided.    Pt verbalized an understanding of plan of care.      Patient Instructions  ~Please call your coordinator at [phone] with any questions or concerns.    ~Decrease the Clonidine to 4 days per week non-dialysis days only. Update coordinator with BP readings.  ~Return in Oct for labs, biopsy and clinic visit.

## 2019-07-09 ENCOUNTER — TELEPHONE (OUTPATIENT)
Dept: TRANSPLANT | Facility: CLINIC | Age: 64
End: 2019-07-09

## 2019-07-09 ENCOUNTER — TRANSFERRED RECORDS (OUTPATIENT)
Dept: HEALTH INFORMATION MANAGEMENT | Facility: CLINIC | Age: 64
End: 2019-07-09

## 2019-07-09 DIAGNOSIS — Z94.1 HEART REPLACED BY TRANSPLANT (H): Primary | ICD-10-CM

## 2019-07-09 NOTE — PROGRESS NOTES
2019    Yahir Turcios MD    [facility]    [address] [place]  [address]       Ana Luisa Mcpherson MD    [facility]    [address] [address]    [place]  [address]       RE: Murray Nicholson   MRN: [id number]   : 1955      Dear Dr. Turcios and Dr. Mcpherson:      We had the pleasure of seeing Mr. Murray Nicholson for followup in our Cardiac Transplant Clinic at the [facility].  As you know, he is a very pleasant 64-year-old male who underwent orthotopic heart transplantation on 2018.  He is currently listed for kidney transplantation.  He had a very complex postoperative course.  His current problem list includes:     1.  Status post orthotopic heart transplantation.   2.  Neutropenia.   3.  Oral mucosal ulcer secondary to neutropenia with Klebsiella infection.   4.  Pseudomonas bacteremia, resolved.   5.  Perforation of the small bowel, status post small-bowel resection and ileostomy.   6.  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   7.  Hypertension.   8.  Hyperlipidemia.   9.  End-stage renal disease requiring hemodialysis.      He returns today for his 12 month follow up.  He is complaining of dizziness on and off, which resolves with increase p.o. fluid intake.  He also feels tired after his dialysis.  He had a bovine graft placed couple of weeks ago.  He has had issues with good flow from his bovine graft for his dialysis.  He intermittently still gets dialyzed through his Rosales catheter.      He has no chest pain or chest pressure at rest or with exertion.  No exertional shortness of breath.  No lightheadedness, dizziness or syncope.  No lower extremity swelling.  No fever or chills.  All other systems reviewed and are negative.       Current Outpatient Medications   Medication Sig     acetaminophen (TYLENOL) 500 MG tablet Take 2 tablets (1,000 mg) by mouth 4 times daily     amLODIPine (NORVASC) 10 MG tablet Take 1 tablet (10 mg)  "by mouth daily     aspirin 81 MG EC tablet Take 81 mg by mouth daily     atorvastatin (LIPITOR) 40 MG tablet Take 1 tablet (40 mg) by mouth daily     biotin (BIOTIN 5000) 5 MG CAPS Take 5 mg by mouth daily     cloNIDine (CATAPRES) 0.1 MG tablet Take four days weekly; non-dialysis     clopidogrel (PLAVIX) 75 MG tablet Take 1 tablet (75 mg) by mouth daily     hydrALAZINE (APRESOLINE) 100 MG TABS tablet Take 1 tablet (100 mg) by mouth every 8 hours     lisinopril (PRINIVIL/ZESTRIL) 10 MG tablet Take 1 tablet (10 mg) by mouth daily     loratadine (CLARITIN) 10 MG tablet Take 10 mg by mouth daily as needed Reported on 5/3/2017     melatonin 1 MG TABS tablet Take 2 tablets (2 mg) by mouth nightly as needed for sleep     mycophenolate (GENERIC EQUIVALENT) 250 MG capsule Take 3 capsules (750 mg) by mouth 2 times daily     NEPHROCAPS 1 MG capsule Take 1 capsule by mouth daily     order for DME Coloplast       1 piece convex light Uriel cut up to 1  \" with filter #[id number]       Or  Shashi         1 piece soft convex 2 1/8\" #[id number]        Accessories   2\" barrier ring #[id number]                           Adapt paste #[id number]                           Adapt powder #7906                          No sting film barrier # [id number]                          Brava elastic barrier strips curved # [id number]    Treatment Diagnosis: New ileostomy     pantoprazole (PROTONIX) 40 MG EC tablet Take 1 tablet (40 mg) by mouth 2 times daily (before meals)     tacrolimus (GENERIC EQUIVALENT) 1 MG capsule Take 3 capsules (3 mg) by mouth 2 times daily     traMADol (ULTRAM) 50 MG tablet Take 50 mg by mouth every 6 hours as needed for severe pain     traMADol (ULTRAM) 50 MG tablet Take 0.5-1 tablets (25-50 mg) by mouth every 6 hours as needed for severe pain (Patient not taking: Reported on 7/8/2019)     No current facility-administered medications for this visit.      Facility-Administered Medications Ordered in Other Visits   Medication     diatrizoate " "meglumine-sodium (GASTROGRAFIN/GASTROVIEW) 66-10 % solution 480 mL        REVIEW OF SYSTEMS:  A detailed 10-point review of systems was obtained as described in History of Present Illness.  All other systems are reviewed and are negative.      PHYSICAL EXAMINATION:   GENERAL:  He was comfortable.  He was in no apparent distress.   /63 (BP Location: Right arm, Patient Position: Chair, Cuff Size: Adult Regular)   Pulse 88   Ht 1.746 m (5' 8.75\")   Wt 76.2 kg (168 lb)   SpO2 100%   BMI 24.99 kg/m    He had no pallor or cyanosis or jaundice.  The lesion on his hard palate is healing nicely.  Cardiac auscultation revealed normal S1-S2 with no murmur rub or gallop.  Auscultation of his lungs reveal equal air entry on both sides with no added sounds.  His abdomen was soft with no wounds and no tenderness no rigidity no guarding.  Colostomy site looks good.  He had no focal neurological deficit.  His extremities show no edema.    Testing:     Echo (06/2019):  Normal biventricular function, chamber size, wall motion, and wall  thickness.The EF is 65-70%.  Normal RV size and function.  No hemodynamically significant valvular anomalies.  The inferior vena cava was normal in size with preserved respiratory  variability. Estimated mean right atrial pressure is 3 mmHg (normal).  No pericardial effusion is present.     This study was compared with the study from 12/3/2018. There has been no  Change.    RHC (06/2018):   RA 9  RV 38/9  PA 35/16 (24)  PCWP 14  PA % 71.5%  CO/CI 9.3/4.99  PVR 1.07    Coronary Angiogram (06/2019):  Left Anterior Descending   Prox LAD lesion is 40% stenosed.   First Diagonal Branch   The vessel is small.   Second Diagonal Branch   The vessel is moderate in size.   Third Diagonal Branch   The vessel is small.   Left Circumflex   First Obtuse Marginal Branch   The vessel is moderate in size.   1st Mrg lesion is 50% stenosed.   Second Obtuse Marginal Branch   The vessel is moderate in size. "   2nd Mrg-1 lesion is 80% stenosed.   2nd Mrg-2 lesion is 80% stenosed.   Third Obtuse Marginal Branch   The vessel is moderate in size.   Right Coronary Artery   Prox RCA lesion is 20% stenosed.   Dist RCA lesion is 40% stenosed.   Right Posterior Descending Artery   RPDA lesion is 20% stenosed.         Biopsy (12/2018):  Acute cellular rejection: No rejection, Grade 0R (ISHLT 2004)   - Antibody-mediated rejection: No evidence of antibody-mediated rejection   - C3d and C4d are negative (see comment)   DSA:     Lab Results   Component Value Date    SAITESTMET [facility] 06/07/2019    SAICELL Class I 06/07/2019    [id number] None 06/07/2019    [id number] None 06/07/2019    SAIREPCOM  06/07/2019      Test performed by modified procedure. Serum heat inactivated and tested   by a modified ([place]) protocol including fetal calf serum addition.   High-risk, mfi >3,000. Mod-risk, mfi 500-3,000.       Lab Results   Component Value Date    SAIITESTME [facility] 06/07/2019    SAIICELL Class II 06/07/2019    [id number] None 06/07/2019    [id number] None 06/07/2019    SAIIREPCOM  06/07/2019      Test performed by modified procedure. Serum heat inactivated and tested   by a modified ([place]) protocol including fetal calf serum addition.   High-risk, mfi >3,000. Mod-risk, mfi 500-3,000.         IMMUNOSUPPRESSANT LEVELS:  Lab Results   Component Value Date    TACROL 7.8 06/26/2019    DOSTAC 2,015 06/26/2019       Lab Results   Component Value Date    CSPEC Plasma, EDTA anticoagulant 06/07/2019       CBC RESULTS:   CBC RESULTS:   Recent Labs   Lab Test 06/26/19  0821   WBC 3.0*   RBC 2.27*   HGB 7.1*   HCT 23.2*   *   MCH 31.3   MCHC 30.6*   RDW 18.0*        Recent Labs   Lab Test 06/26/19  0821 06/07/19  0850    135   POTASSIUM 4.1 4.2   CHLORIDE 100 99   CO2 26 27   ANIONGAP 8 8   * 107*   BUN 28 27   CR 5.17* 5.16*   TRINI 8.7 9.1     Liver Function Studies -   Recent Labs   Lab Test 06/07/19  0850   PROTTOTAL 7.0    ALBUMIN 3.4   BILITOTAL 1.1   ALKPHOS 278*   AST 14   ALT 19       Lab Results   Component Value Date    A1C 5.2 06/10/2019     Lab Results   Component Value Date    PHOS 3.0 06/07/2019     PSA   Date Value Ref Range Status   06/07/2019 1.40 0 - 4 ug/L Final     Comment:     Assay Method:  Chemiluminescence using Siemens Vista analyzer       CMV PCR - negative  EBV - Negative.     ASSESSMENT AND PLAN:       Mr. Murray Nicholson is a 64-year-old male status post orthotopic heart transplantation in June with a very complex postoperative course who returns today for followup.     1.  Orthotopic heart transplantation.      Mr. Nicholson has normal graft function.    He has not had any evidence of rejection  No DSA  Moderate donor coronary artery disease with no evidence of progression to suggest allograft vasculopathy.     FK goal of 6-8 and Cellcept 750 mg bid. Immunknow level ok.   Will continue aspirin and Lipitor for coronary artery disease. Lipid profile is normal. DM is under control. He does not smoke. Repeat coronary angiogram in a year or sooner if he has any symptoms or worsenign graft function.    2.  Hypertension.  His blood pressure is currently controlled on hydralazine 100 mg 3 times a day, lisinopril 5 mg, Norvasc 10 mg, and Clonidine 0.1 mg daily. In fact, he occasional has low blood pressure. Will try to wean his clonidine - decrease it to 0.1 mg every other day for 4 weeks, twice a week for two weeks, and then once a week for two weeks.     3.  End-stage renal disease.  He continues to remain on dialysis 3 times a week.  He has a bovine AV graft. Will check with nephrology regarding the need and removal of his Rosales catheter.     4.  Neutropenia - resolved. On low-dose of CellCept.      5.  High risk for CMV.  Will check CMV as per protocol. He has completed 6 months of close survelliance.     6.  Oral ulcer with Klebsiella - Healed.      7.  Perforated bowel - status post colostomy and reversal of  ostomy.     8.  Diabetes - under better control. Off insulin.     9. Anemia - likely related to CKD. His epogen dose was recently increased. Will continue to monitor.      RTC as per protocol in 4 months with biopsy. Will continue to do biopsy given his high allomap score. . He will call in the interim if he has any worsening symptoms.       Sincerely,   Klever Ernst MD   [facility]  Heart Failure, Transplant, and Mechanical Circulatory Support Cardiology   Cardiovascular Division  [facility]   [phone]

## 2019-07-10 ENCOUNTER — TELEPHONE (OUTPATIENT)
Dept: TRANSPLANT | Facility: CLINIC | Age: 64
End: 2019-07-10

## 2019-07-10 DIAGNOSIS — Z94.1 HISTORY OF HEART TRANSPLANT (H): Primary | ICD-10-CM

## 2019-07-10 DIAGNOSIS — Z76.82 ORGAN TRANSPLANT CANDIDATE: ICD-10-CM

## 2019-07-10 DIAGNOSIS — Z99.2 ESRD ON DIALYSIS (H): ICD-10-CM

## 2019-07-10 DIAGNOSIS — T82.898A OTHER SPECIFIED COMPLICATION OF VASCULAR PROSTHETIC DEVICES, IMPLANTS AND GRAFTS, INITIAL ENCOUNTER (H): ICD-10-CM

## 2019-07-10 DIAGNOSIS — N18.6 ESRD (END STAGE RENAL DISEASE) (H): ICD-10-CM

## 2019-07-10 DIAGNOSIS — T82.9XXA COMPLICATION OF VASCULAR ACCESS FOR DIALYSIS, INITIAL ENCOUNTER: ICD-10-CM

## 2019-07-10 DIAGNOSIS — Z94.1 S/P HETEROTOPIC HEART TRANSPLANT (H): Primary | ICD-10-CM

## 2019-07-10 DIAGNOSIS — T82.9XXA COMPLICATION OF DIALYSIS ACCESS INSERTION, INITIAL ENCOUNTER: Primary | ICD-10-CM

## 2019-07-10 DIAGNOSIS — I71.40 AAA (ABDOMINAL AORTIC ANEURYSM) (H): ICD-10-CM

## 2019-07-10 DIAGNOSIS — N18.6 ESRD ON DIALYSIS (H): ICD-10-CM

## 2019-07-10 NOTE — TELEPHONE ENCOUNTER
Received a call from [facility] dialysis RN, who requested to schedule fistulogram of patient's LUE AV graft which ordered by Dr. Mcpherson, it has been faxed to IR yesterday. Dialysis RN asked writer to schedule fistulogram appt because she did not receive any call from IR. She wanted to know writer received faxed orders. Writer stressed that she does not work with IR directly, writer will place hr own orders under her credential. Recommended dialysis RN should call IR scheduling for appt herself. Writer also asked there is any LUE US of AV graft orders, usually we would like the patient to have US to evaluate AV graft issues prior to sending patient to an invasive procedure. The dialysis RN stated that there is no US orders of LUE AV graft. The reason it needs fistulogram because LUE AV graft is occasionally pulling blood clots from needles and requires to re-stick needles more than once, she reported that the patient did not like it. However, LUE AV graft has ++ thrill and bruit.  Writer stated that she did not receive any message from Dr. Mcpherson directly, and she highly recommend the patient should have US of AV graft to determine fistulogram needs. Dialysis RN insisted wanting to set up fistulogram for the patient and has minimal understanding AV graft evaluation process.  Writer provided IR fax #, scheduling #, Vascular lab fax # and imaging scheduling # for dialysis RN to call and fax Dr. Mcpherson orders. Writer also advised dialysis RN to contact Dr. Mcpherson to place orders herself in Epic. Dialysis RN verbalized acceptance and understanding of plan.

## 2019-07-10 NOTE — TELEPHONE ENCOUNTER
Received a call from the patient regarding LUE AV graft issues. He reports that his left hand and finger tips feeling numb and cold at end of dialysis runs, but no issues he is not on dialysis. Writer recommended to place a warm pack to left hand on dialysis. He also reports that his LUE AV graft rolls when dialysis staff insert needles into AV graft. Writer recommended to keep the same position every time when dialysis staff insertion needles into AV graft and they should use two fingers to secure the position of AV graft to avoid rolling from side to side. If circumstance allows, dialysis center should designated the best cannulator to insert needles to avoid and trauma of AV graft until AV graft no longer having issues. Patient verbalized acceptance and understanding of plan.

## 2019-07-10 NOTE — TELEPHONE ENCOUNTER
Coordinator received msg from pt's heart transplant coordinator that pt has been recovering well from transplant and wondering what he needed to do to be activated on kidney transplant wait list. Coordinator called pt to review following items will be ordered for active status consideration. Reviewed our providers may require additional tests prior to active status:    1. Nephrology  2. SW  3. Iliacs    Pt verbalized understanding and has no further questions.

## 2019-07-12 ENCOUNTER — ANCILLARY PROCEDURE (OUTPATIENT)
Dept: ULTRASOUND IMAGING | Facility: CLINIC | Age: 64
End: 2019-07-12
Attending: CLINICAL NURSE SPECIALIST
Payer: MEDICARE

## 2019-07-12 DIAGNOSIS — Z99.2 ESRD ON DIALYSIS (H): ICD-10-CM

## 2019-07-12 DIAGNOSIS — Z94.1 S/P HETEROTOPIC HEART TRANSPLANT (H): ICD-10-CM

## 2019-07-12 DIAGNOSIS — N18.6 ESRD ON DIALYSIS (H): ICD-10-CM

## 2019-07-12 DIAGNOSIS — T82.898A OTHER SPECIFIED COMPLICATION OF VASCULAR PROSTHETIC DEVICES, IMPLANTS AND GRAFTS, INITIAL ENCOUNTER (H): ICD-10-CM

## 2019-07-12 DIAGNOSIS — T82.9XXA COMPLICATION OF VASCULAR ACCESS FOR DIALYSIS, INITIAL ENCOUNTER: ICD-10-CM

## 2019-07-13 DIAGNOSIS — E11.22 TYPE 2 DIABETES MELLITUS WITH STAGE 5 CHRONIC KIDNEY DISEASE NOT ON CHRONIC DIALYSIS, WITHOUT LONG-TERM CURRENT USE OF INSULIN (H): ICD-10-CM

## 2019-07-13 DIAGNOSIS — N18.5 TYPE 2 DIABETES MELLITUS WITH STAGE 5 CHRONIC KIDNEY DISEASE NOT ON CHRONIC DIALYSIS, WITHOUT LONG-TERM CURRENT USE OF INSULIN (H): ICD-10-CM

## 2019-07-13 NOTE — TELEPHONE ENCOUNTER
"Requested Prescriptions   Pending Prescriptions Disp Refills     blood glucose monitoring (ACCU-CHEK FASTCLIX) lancets [Pharmacy Med Name: ACCU-CHEK FASTCLIX LANCETS 102'S]  Last Written Prescription Date:  04/11/2019  Last Fill Quantity: 102 each,  # refills: 1   Last Office Visit: 12/28/2018   Future Office Visit:     DISCONTINUED   102 each 0     Sig: USE TO TEST 2-3 TIMES DAILY OR AS DIRECTED.       Diabetic Supplies Protocol Failed - 7/13/2019  8:03 AM        Failed - Medication is active on med list        Passed - Patient is 18 years of age or older        Passed - Recent (6 mo) or future (30 days) visit within the authorizing provider's specialty     Patient had office visit in the last 6 months or has a visit in the next 30 days with authorizing provider.  See \"Patient Info\" tab in inbasket, or \"Choose Columns\" in Meds & Orders section of the refill encounter.            "

## 2019-07-15 RX ORDER — LANCETS
EACH MISCELLANEOUS
Qty: 102 EACH | Refills: 0 | Status: ON HOLD | OUTPATIENT
Start: 2019-07-15 | End: 2019-10-28

## 2019-07-16 ENCOUNTER — TELEPHONE (OUTPATIENT)
Dept: TRANSPLANT | Facility: CLINIC | Age: 64
End: 2019-07-16

## 2019-07-17 NOTE — TELEPHONE ENCOUNTER
Reviewed and discussed LUE US of AV graft results on 7/15/2019 with Dr. Cheney,   1. No graft thrombus or aneurysm.  2. Arterial anastomotic stenosis. Greater than 50% diameter stenosis by grayscale imaging. More than quadrupling of velocity from the brachial artery through the anastomosis.  3. Bend or kink in the graft at the axilla with over triple the velocity of the mid graft.  4. Venous anastomotic narrowing, less than 50% diameter by grayscale imaging.  5. Adequate flow volumes.  Dr. Cheney recommended to repeat LUE US of AV graft with left arm position without issues of pulling blood clots when inserting needles.   Writer called the patient and provided Dr. Cheney's recommendation and will schedule LUE US of AV graft with specific instructions. He reports that left arm moves away from trunk with 30 degree angle and his left palm is up when dialysis staff cannulating AV graft. Left arm is supported by a pillow.  He has not tried different positions of left arm for AV graft needle insertion. Today, LUE AV graft pulled blood clots form venous needle and required to re-stick AV graft today.  Writer instructed the patient to call her on Thursday after dialysis staff inserting needles into AV graft with left arm in a different position, to determine specific instructions for next LUE US of AV graft. Patient verbalized acceptance and understanding of plan.

## 2019-07-18 ENCOUNTER — TELEPHONE (OUTPATIENT)
Dept: TRANSPLANT | Facility: CLINIC | Age: 64
End: 2019-07-18

## 2019-07-19 DIAGNOSIS — N18.6 ESRD ON DIALYSIS (H): ICD-10-CM

## 2019-07-19 DIAGNOSIS — Z94.1 S/P HETEROTOPIC HEART TRANSPLANT (H): Primary | ICD-10-CM

## 2019-07-19 DIAGNOSIS — Z99.2 ESRD ON DIALYSIS (H): ICD-10-CM

## 2019-07-19 DIAGNOSIS — T82.898A OTHER SPECIFIED COMPLICATION OF VASCULAR PROSTHETIC DEVICES, IMPLANTS AND GRAFTS, INITIAL ENCOUNTER (H): ICD-10-CM

## 2019-07-19 DIAGNOSIS — T82.9XXA COMPLICATION OF VASCULAR ACCESS FOR DIALYSIS, INITIAL ENCOUNTER: ICD-10-CM

## 2019-07-19 NOTE — TELEPHONE ENCOUNTER
A F/U call to patient regarding LUE AV graft functioning on dialysis today. He reports that no issues of LUE AV graft today, no blood clots pull from needles nor re-stick AV graft. He described that he Kept Left thumb up, slightly bend left elbow and Kept Left arm in abduction with 30 degrees away from body while dialysis staff inserting needles. Writer will place LUE US of AV graft order with specific instruction to have US to be done per Dr. Cheney. A  will call him for appt.  The patient also instructed to have the same position of left arm when inserting needles each time on dialysis. Patient verbalized acceptance and understanding of plan.  Writer discussed with Alex Fontanez (vascular lab supervisor) for above specific instructions of LUE US of AV graft.

## 2019-07-24 ENCOUNTER — ANCILLARY PROCEDURE (OUTPATIENT)
Dept: ULTRASOUND IMAGING | Facility: CLINIC | Age: 64
End: 2019-07-24
Attending: CLINICAL NURSE SPECIALIST
Payer: MEDICARE

## 2019-07-24 DIAGNOSIS — Z94.1 S/P HETEROTOPIC HEART TRANSPLANT (H): ICD-10-CM

## 2019-07-24 DIAGNOSIS — T82.9XXA COMPLICATION OF VASCULAR ACCESS FOR DIALYSIS, INITIAL ENCOUNTER: ICD-10-CM

## 2019-07-24 DIAGNOSIS — T82.898A OTHER SPECIFIED COMPLICATION OF VASCULAR PROSTHETIC DEVICES, IMPLANTS AND GRAFTS, INITIAL ENCOUNTER (H): ICD-10-CM

## 2019-07-24 DIAGNOSIS — N18.6 ESRD ON DIALYSIS (H): ICD-10-CM

## 2019-07-24 DIAGNOSIS — Z99.2 ESRD ON DIALYSIS (H): ICD-10-CM

## 2019-07-25 NOTE — TELEPHONE ENCOUNTER
Called patient at [phone] and number keeps dropping, no ringing nothing.  I have scheduled patient for Tues 8/20 and mailing schedule to his home address.

## 2019-07-30 ENCOUNTER — TELEPHONE (OUTPATIENT)
Dept: TRANSPLANT | Facility: CLINIC | Age: 64
End: 2019-07-30

## 2019-07-30 DIAGNOSIS — N18.6 ESRD ON DIALYSIS (H): Primary | ICD-10-CM

## 2019-07-30 DIAGNOSIS — Z99.2 ESRD ON DIALYSIS (H): Primary | ICD-10-CM

## 2019-07-30 DIAGNOSIS — Z45.2 ENCOUNTER FOR ADJUSTMENT AND MANAGEMENT OF VASCULAR ACCESS DEVICE: ICD-10-CM

## 2019-07-30 DIAGNOSIS — Z99.2 ENCOUNTER REGARDING VASCULAR ACCESS FOR DIALYSIS FOR END-STAGE RENAL DISEASE (H): ICD-10-CM

## 2019-07-30 DIAGNOSIS — N18.6 ENCOUNTER REGARDING VASCULAR ACCESS FOR DIALYSIS FOR END-STAGE RENAL DISEASE (H): ICD-10-CM

## 2019-07-30 NOTE — TELEPHONE ENCOUNTER
Received a call from the patient regarding continue Plavix yesterday. He has been on Plavix for almost 3 months after AV graft surgery per protocol. His last does is next week. Discussed with Dr. Cheney regarding Plavix, he recommended ok to discontinue after last dose.  Write called the patient regarding Dr. Cheney recommendation, patient verbalized acceptance and understanding of plan.

## 2019-08-09 ENCOUNTER — RESULTS ONLY (OUTPATIENT)
Dept: OTHER | Facility: CLINIC | Age: 64
End: 2019-08-09

## 2019-08-09 ENCOUNTER — TELEPHONE (OUTPATIENT)
Dept: TRANSPLANT | Facility: CLINIC | Age: 64
End: 2019-08-09

## 2019-08-09 ENCOUNTER — OFFICE VISIT (OUTPATIENT)
Dept: TRANSPLANT | Facility: CLINIC | Age: 64
End: 2019-08-09
Attending: CLINICAL NURSE SPECIALIST
Payer: MEDICARE

## 2019-08-09 VITALS
OXYGEN SATURATION: 100 % | HEART RATE: 80 BPM | BODY MASS INDEX: 24.11 KG/M2 | WEIGHT: 162.1 LBS | DIASTOLIC BLOOD PRESSURE: 67 MMHG | SYSTOLIC BLOOD PRESSURE: 144 MMHG

## 2019-08-09 DIAGNOSIS — I10 ESSENTIAL HYPERTENSION: ICD-10-CM

## 2019-08-09 DIAGNOSIS — N18.6 ESRD ON DIALYSIS (H): ICD-10-CM

## 2019-08-09 DIAGNOSIS — Z94.1 HEART REPLACED BY TRANSPLANT (H): ICD-10-CM

## 2019-08-09 DIAGNOSIS — I10 HYPERTENSION GOAL BP (BLOOD PRESSURE) < 130/80: ICD-10-CM

## 2019-08-09 DIAGNOSIS — Z99.2 ESRD ON DIALYSIS (H): Primary | ICD-10-CM

## 2019-08-09 DIAGNOSIS — Z45.2 ENCOUNTER FOR ADJUSTMENT AND MANAGEMENT OF VASCULAR ACCESS DEVICE: ICD-10-CM

## 2019-08-09 DIAGNOSIS — Z94.1 HISTORY OF HEART TRANSPLANT (H): ICD-10-CM

## 2019-08-09 DIAGNOSIS — N18.6 ENCOUNTER REGARDING VASCULAR ACCESS FOR DIALYSIS FOR END-STAGE RENAL DISEASE (H): ICD-10-CM

## 2019-08-09 DIAGNOSIS — Z76.82 ORGAN TRANSPLANT CANDIDATE: ICD-10-CM

## 2019-08-09 DIAGNOSIS — Z99.2 ESRD ON DIALYSIS (H): ICD-10-CM

## 2019-08-09 DIAGNOSIS — N18.6 ESRD (END STAGE RENAL DISEASE) (H): ICD-10-CM

## 2019-08-09 DIAGNOSIS — I71.40 AAA (ABDOMINAL AORTIC ANEURYSM) (H): ICD-10-CM

## 2019-08-09 DIAGNOSIS — Z99.2 ENCOUNTER REGARDING VASCULAR ACCESS FOR DIALYSIS FOR END-STAGE RENAL DISEASE (H): ICD-10-CM

## 2019-08-09 DIAGNOSIS — N18.6 ESRD ON DIALYSIS (H): Primary | ICD-10-CM

## 2019-08-09 LAB
INR PPP: 1.13 (ref 0.86–1.14)
PLATELET # BLD AUTO: 242 10E9/L (ref 150–450)

## 2019-08-09 PROCEDURE — 36589 REMOVAL TUNNELED CV CATH: CPT | Mod: ZF | Performed by: CLINICAL NURSE SPECIALIST

## 2019-08-09 PROCEDURE — 25000125 ZZHC RX 250: Mod: ZF | Performed by: CLINICAL NURSE SPECIALIST

## 2019-08-09 PROCEDURE — 85610 PROTHROMBIN TIME: CPT | Performed by: CLINICAL NURSE SPECIALIST

## 2019-08-09 PROCEDURE — 86833 HLA CLASS II HIGH DEFIN QUAL: CPT | Performed by: INTERNAL MEDICINE

## 2019-08-09 PROCEDURE — 85049 AUTOMATED PLATELET COUNT: CPT | Performed by: CLINICAL NURSE SPECIALIST

## 2019-08-09 PROCEDURE — 86832 HLA CLASS I HIGH DEFIN QUAL: CPT | Performed by: INTERNAL MEDICINE

## 2019-08-09 PROCEDURE — 36415 COLL VENOUS BLD VENIPUNCTURE: CPT | Performed by: CLINICAL NURSE SPECIALIST

## 2019-08-09 RX ADMIN — LIDOCAINE HYDROCHLORIDE 10 ML: 10 INJECTION, SOLUTION EPIDURAL; INFILTRATION; INTRACAUDAL; PERINEURAL at 13:40

## 2019-08-09 NOTE — TELEPHONE ENCOUNTER
Clonidine decreased to 4x per week at clinic visit.  Pt reports /150 in the AM; better after dialysis. Writer will check with Dr RAPP re any further med changes.

## 2019-08-09 NOTE — PATIENT INSTRUCTIONS
Home care For CVC Tunneled Line Removal    No shower in next 48 hours.  Keep dressing at exit site for 48 hours.  After 48 hours the dressing may be removed and the site may be left uncovered and may get wet if the site has sealed. If the site has not sealed  keep it covered and dry with daily dressing changes until sealed.   Remain upright for next 4-6 hours, avoid bending forward.  Monitor exit site dressing for blood oozing or stain and supraclavicular site for swelling,  If blood stain notice on dressing. May apply pressure at vein exit site (at collar bone area above CVC line exit site) for 15 minutes.  May apply pressure at vein exit site (at collar bone area above CVC line exit site) when coughing  If blood running down from exit site dressing after pressure for 15 minutes, may need to seek emergency assistance immediately.    If you have any questions, please contact Torsten MARTIN (Sum) CNS at [phone]    Torsten MARTIN (Sum), SARAH  Dialysis Vascular Access/SOT Clinical Nurse Specialist    Solid Organ Transplant Service - [facility]   Phone # [phone]

## 2019-08-09 NOTE — LETTER
8/9/2019       RE: Murray Nicholson  [address]  [place] [address]     Dear Colleague,    Thank you for referring your patient, Murray Nicholson, to the [facility] SOLID ORGAN TRANSPLANT at [facility]. Please see a copy of my visit note below.      Transplant Surgery  Operative Note    PREOP DIAGNOSIS: End Stage Renal Failure   POSTOP DIAGNOSIS: Same  PROCEDURE: Removal Tunneled Dialysis Catheter  FACULTY: Calin Cheney M.D.  SURGEON: Torsten MARTIN CNS    ANESTHESIA: 1% buffered Lidocaine, 5.0 cc.  EBL: <10 ml.  SPECIMEN: none  FINDINGS: Normal.  COMPLICATIONS: None.    INDICATION: The patient does not require continued presence of tunneled dialysis catheter due to Maturation of permanent HD access. The indications, benefits, and risks of catheter removal were discussed, questions answered,and informed consent was provided.     PROCEDURE: The patient was placed supine on the exam bed.  The catheter, right chest and neck were prepped and draped in a sterile fashion. Local anesthetic was instilled around the catheter exit site to accomplish a field block.  Blunt dissection released the subcutaneous tissues from the cuff.  The catheter was then removed.  The catheter/cuff was inspected and appeared intact. A pressure dressing was applied at the vein exit site and a dry dressing applied to the exit site. Faculty was immediately available.     Torsten MARTIN (Sum), CNS  Dialysis Vascular Access/SOT Clinical Nurse Specialist    Solid Organ Transplant Service - [facility]   Phone # [phone]  Pager # [phone]

## 2019-08-09 NOTE — PROGRESS NOTES
Transplant Surgery  Operative Note    PREOP DIAGNOSIS: End Stage Renal Failure   POSTOP DIAGNOSIS: Same  PROCEDURE: Removal Tunneled Dialysis Catheter  FACULTY: Calin Cheney M.D.  SURGEON: Torsten MARTIN CNS    ANESTHESIA: 1% buffered Lidocaine, 5.0 cc.  EBL: <10 ml.  SPECIMEN: none  FINDINGS: Normal.  COMPLICATIONS: None.    INDICATION: The patient does not require continued presence of tunneled dialysis catheter due to Maturation of permanent HD access. The indications, benefits, and risks of catheter removal were discussed, questions answered,and informed consent was provided.     PROCEDURE: The patient was placed supine on the exam bed.  The catheter, right chest and neck were prepped and draped in a sterile fashion. Local anesthetic was instilled around the catheter exit site to accomplish a field block.  Blunt dissection released the subcutaneous tissues from the cuff.  The catheter was then removed.  The catheter/cuff was inspected and appeared intact. A pressure dressing was applied at the vein exit site and a dry dressing applied to the exit site. Faculty was immediately available.     Torsten MARTIN (Sum), CNS  Dialysis Vascular Access/SOT Clinical Nurse Specialist    [facility] Trans[facility]   Phone # [phone]  Pager # [phone]

## 2019-08-09 NOTE — NURSING NOTE
Chief Complaint   Patient presents with     Procedure     cvc tunneline removal     Blood pressure (!) 144/67, pulse 80, weight 73.5 kg (162 lb 1.6 oz), SpO2 100 %.    Assisted with CVC Tunnel Line Removal.  Held firm pressure on vein site for 15 minutes after procedure.  Reviewed after-visit summary and post-procedure instructions with patient.  Patient remained in clinic for an additional 15 minutes.   Patient was discharged in a stable condition.    Jessy Anderson CMA

## 2019-08-12 ENCOUNTER — TELEPHONE (OUTPATIENT)
Dept: TRANSPLANT | Facility: CLINIC | Age: 64
End: 2019-08-12

## 2019-08-12 NOTE — TELEPHONE ENCOUNTER
Pt called to cancel Aug 20th appointments that is dialysis day  Needs to reschedule for Mon/Wed/Fr

## 2019-08-12 NOTE — TELEPHONE ENCOUNTER
Coordinator returned pt's call and left msg. Explained our nephrology APPs only have clinic on Tuesdays or Thursdays. If pt would like to move some of his other return wait list appointments, requested pt call back so I can notify our schedulers. Also let pt know often times dialysis centers will move around run times for appointments.

## 2019-08-13 LAB
DONOR IDENTIFICATION: NORMAL
DSA COMMENTS: NORMAL
DSA PRESENT: NO
DSA TEST METHOD: NORMAL
ORGAN: NORMAL
PROTOCOL CUTOFF: NORMAL
SA1 CELL: NORMAL
SA1 COMMENTS: NORMAL
SA1 HI RISK ABY: NORMAL
SA1 MOD RISK ABY: NORMAL
SA1 TEST METHOD: NORMAL
SA2 CELL: NORMAL
SA2 COMMENTS: NORMAL
SA2 HI RISK ABY UA: NORMAL
SA2 MOD RISK ABY: NORMAL
SA2 TEST METHOD: NORMAL
UNACCEPTABLE ANTIGEN: NORMAL
UNOS CPRA: 0

## 2019-08-14 ENCOUNTER — TELEPHONE (OUTPATIENT)
Dept: TRANSPLANT | Facility: CLINIC | Age: 64
End: 2019-08-14

## 2019-08-14 RX ORDER — CLONIDINE HYDROCHLORIDE 0.1 MG/1
TABLET ORAL
Qty: 48 TABLET | Refills: 3
Start: 2019-08-14 | End: 2019-09-24

## 2019-08-14 RX ORDER — LISINOPRIL 10 MG/1
TABLET ORAL
Qty: 90 TABLET | Refills: 3 | Status: SHIPPED | OUTPATIENT
Start: 2019-08-14 | End: 2019-09-06

## 2019-08-14 RX ORDER — LISINOPRIL 5 MG/1
TABLET ORAL
Qty: 90 TABLET | Refills: 3 | Status: SHIPPED | OUTPATIENT
Start: 2019-08-14 | End: 2019-09-06 | Stop reason: ALTCHOICE

## 2019-08-14 NOTE — TELEPHONE ENCOUNTER
Returned call to Murray regarding SBP's in 140's-150's. Per Dr. Ernst increase lisinopril to 15 mg daily, decrease clonidine to 0.1 mg twice a week on non dialysis days. Murray verbalizes understanding. Will call with questions/concerns.

## 2019-08-14 NOTE — TELEPHONE ENCOUNTER
Coordinator returned pt's call. Pt would like to reschedule his [facility] appointments for 8/20/19. Pt states he did not listen to voicemail left on 8/12/19 explaining our nephrology transplant APPS only have clinic on Tues and Thurs. Pt states he is willing to come in on 2 days to complete wait list appointments so he does not have to reschedule his dialysis run times. Reviewed our  had cancelled all his appointments for 8/20/19 and will send  a request to contact pt to reschedule. Pt would like iliacs on MWF early in the am since he has to be NPO for appointment.    Coordinator sent staff msg to  and supervisor to review and call pt.

## 2019-08-15 ENCOUNTER — TELEPHONE (OUTPATIENT)
Dept: TRANSPLANT | Facility: CLINIC | Age: 64
End: 2019-08-15

## 2019-08-16 ENCOUNTER — TELEPHONE (OUTPATIENT)
Dept: TRANSPLANT | Facility: CLINIC | Age: 64
End: 2019-08-16

## 2019-08-16 NOTE — TELEPHONE ENCOUNTER
Spoke with the patient and confirmed wait list  appointments on September 23 and 24.  Informed patient an itinerary can be accessed on [employer], and will be sent via mail.

## 2019-08-19 ENCOUNTER — TELEPHONE (OUTPATIENT)
Dept: TRANSPLANT | Facility: CLINIC | Age: 64
End: 2019-08-19

## 2019-08-21 ENCOUNTER — OFFICE VISIT (OUTPATIENT)
Dept: FAMILY MEDICINE | Facility: CLINIC | Age: 64
End: 2019-08-21
Payer: MEDICARE

## 2019-08-21 VITALS
DIASTOLIC BLOOD PRESSURE: 52 MMHG | TEMPERATURE: 98.2 F | BODY MASS INDEX: 24.1 KG/M2 | OXYGEN SATURATION: 99 % | SYSTOLIC BLOOD PRESSURE: 112 MMHG | WEIGHT: 162 LBS | RESPIRATION RATE: 16 BRPM | HEART RATE: 89 BPM

## 2019-08-21 DIAGNOSIS — R09.89 RUNNY NOSE: Primary | ICD-10-CM

## 2019-08-21 PROCEDURE — 99213 OFFICE O/P EST LOW 20 MIN: CPT | Performed by: FAMILY MEDICINE

## 2019-08-21 RX ORDER — TRAMADOL HYDROCHLORIDE 50 MG/1
25-50 TABLET ORAL EVERY 6 HOURS PRN
Qty: 10 TABLET | Refills: 0 | Status: CANCELLED | OUTPATIENT
Start: 2019-08-21

## 2019-08-21 RX ORDER — FLUTICASONE PROPIONATE 50 MCG
1 SPRAY, SUSPENSION (ML) NASAL DAILY
Status: CANCELLED | OUTPATIENT
Start: 2019-08-21

## 2019-08-21 RX ORDER — FLUTICASONE PROPIONATE 50 MCG
1 SPRAY, SUSPENSION (ML) NASAL DAILY
Qty: 11.1 ML | Refills: 11 | Status: SHIPPED | OUTPATIENT
Start: 2019-08-21 | End: 2020-06-30

## 2019-08-21 NOTE — PATIENT INSTRUCTIONS
Claritin take one in the morning and one at night    Continue the fluticasone nasal spray once a day    Appointment with allergy specialist if things haven't improved in the next couple weeks  -- make appt now, cancel if symptoms improve

## 2019-08-21 NOTE — PROGRESS NOTES
Subjective:   Murray Nicholson is a 64 year old male who presents for   Chief Complaint   Patient presents with     Throat Problem     hoarseness in throat for the past few weeks. Pt started taking Flonase; ran out. The Floanse seemed to help a little bit     Nasal Congestion     since heart transplant (June 14 2018) nose is running more frequently.      Refill Request     Flonase nasal spray      Reports that he has a slight hoarseness that may be due to saliva/mucus buildup.   He feels that he is blowing his nose more frequently, especially in the morning.   Has been present over last few weeks.   No present cough.  He denies sinus pressure. No recent head injuries or car accidents.     Seasonal allergies. He uses fluticasone and loratadine (in the morning).       Patient Active Problem List    Diagnosis Date Noted     Status post coronary angiogram 06/28/2019     Priority: Medium     Ileostomy status (H) 03/27/2019     Priority: Medium     Colostomy status (H) 12/11/2018     Priority: Medium     Heart replaced by transplant (H) 12/10/2018     Priority: Medium     Added automatically from request for surgery [id number]       Sigmoid diverticulitis 08/14/2018     Priority: Medium     Bacteremia due to Pseudomonas 08/12/2018     Priority: Medium     Heart transplant recipient (H) 07/26/2018     Priority: Medium     Fever 07/26/2018     Priority: Medium     Leukopenia 07/11/2018     Priority: Medium     Heart transplanted (H) 06/15/2018     Priority: Medium     HRD    Donor CMV + / Recipient CMV -    Donor EBV +        Anemia in stage 5 chronic kidney disease (H) 03/17/2017     Priority: Medium     Anemia, iron deficiency 02/15/2017     Priority: Medium     Type 2 diabetes mellitus with diabetic chronic kidney disease (H) 10/14/2015     Priority: Medium     Hypertension      Priority: Medium     Dyslipidemia      Priority: Medium     MGUS (monoclonal gammopathy of unknown significance)      Priority: Medium     Ascending  aortic aneurysm (H)      Priority: Medium     Anemia in chronic renal disease 05/19/2015     Priority: Medium     updating diagnosis code for icd10 cutover       Anemia of chronic disease 04/12/2013     Priority: Medium     Problem list name updated by automated process. Provider to review and confirm       Hypertension goal BP (blood pressure) < 130/80 01/25/2011     Priority: Medium     Hyperlipidemia LDL goal <100 10/31/2010     Priority: Medium     Per provider       CKD (chronic kidney disease) stage 5, GFR less than 15 ml/min (H) 02/09/2010     Priority: Medium     Aortic stenosis 08/13/2008     Priority: Medium     Overview:   Bicuspid aortic valve       Pulmonary hypertension (H) 08/13/2008     Priority: Medium     Overview:   62 mm/Hg + RAP on 8/13/08 ECHO       Severe uncontrolled hypertension 08/13/2008     Priority: Medium     Allergic rhinitis 04/05/2006     Priority: Medium     Problem list name updated by automated process. Provider to review       Esophageal reflux 08/12/2004     Priority: Medium       Current Outpatient Medications   Medication     acetaminophen (TYLENOL) 500 MG tablet     amLODIPine (NORVASC) 10 MG tablet     aspirin 81 MG EC tablet     atorvastatin (LIPITOR) 40 MG tablet     biotin (BIOTIN 5000) 5 MG CAPS     blood glucose monitoring (ACCU-CHEK FASTCLIX) lancets     cloNIDine (CATAPRES) 0.1 MG tablet     fluticasone (FLONASE) 50 MCG/ACT nasal spray     hydrALAZINE (APRESOLINE) 100 MG TABS tablet     lisinopril (PRINIVIL/ZESTRIL) 10 MG tablet     lisinopril (PRINIVIL/ZESTRIL) 5 MG tablet     loratadine (CLARITIN) 10 MG tablet     melatonin 1 MG TABS tablet     mycophenolate (GENERIC EQUIVALENT) 250 MG capsule     NEPHROCAPS 1 MG capsule     pantoprazole (PROTONIX) 40 MG EC tablet     tacrolimus (GENERIC EQUIVALENT) 1 MG capsule     traMADol (ULTRAM) 50 MG tablet     clopidogrel (PLAVIX) 75 MG tablet     No current facility-administered medications for this visit.       Facility-Administered Medications Ordered in Other Visits   Medication     diatrizoate meglumine-sodium (GASTROGRAFIN/GASTROVIEW) 66-10 % solution 480 mL       ROS:  As above per HPI    Objective:   /52   Pulse 89   Temp 98.2  F (36.8  C) (Oral)   Resp 16   Wt 73.5 kg (162 lb)   SpO2 99%   BMI 24.10 kg/m  , Body mass index is 24.1 kg/m .  Gen:  NAD, well-nourished, sitting in chair comfortably  HEENT: EOMI, sclera anicteric, Head normocephalic, ; nares patent; moist mucous membranes, no posterior oropharynx cobblestoning, no rhinorrhea  Neck: trachea midline, no thyromegaly  CV:  Hemodynamically stable  Pulm:  no increased work of breathing   Extrem: no cyanosis, edema or clubbing  Skin: no obvious rashes or abnormalities  Psych: Euthymic, linear thoughts, normal rate of speech        Assessment & Plan:   Murray Nicholson, 64 year old male who presents with:  Runny nose/ voice hoarseness: Allergies vs reflux. Will recommend doubling up on claritin and continuing fluticasone nasal spray. If symptoms don't improve I've provided him with a referral to allergist for a second opinion.     Navid Rodriguez MD   [place] [facility]    The use of Dragon/Lectorati dictation services may have been used to construct the content in this note; any grammatical or spelling errors are non-intentional. Please contact the author of this note directly if you are in need of any clarification.

## 2019-08-27 ENCOUNTER — TELEPHONE (OUTPATIENT)
Dept: TRANSPLANT | Facility: CLINIC | Age: 64
End: 2019-08-27

## 2019-08-27 NOTE — TELEPHONE ENCOUNTER
Called pt to see how BP has been since increasing Lisinopril and decreasing Clonidine. Pt states he has fallen behind in checking his pressures. No recent readings to report.    Will check over the next few days and touch base early next week.

## 2019-09-06 ENCOUNTER — TELEPHONE (OUTPATIENT)
Dept: TRANSPLANT | Facility: CLINIC | Age: 64
End: 2019-09-06

## 2019-09-06 DIAGNOSIS — I10 HYPERTENSION GOAL BP (BLOOD PRESSURE) < 130/80: ICD-10-CM

## 2019-09-06 RX ORDER — LISINOPRIL 10 MG/1
10 TABLET ORAL 2 TIMES DAILY
Qty: 180 TABLET | Refills: 3 | Status: ON HOLD | OUTPATIENT
Start: 2019-09-06 | End: 2019-10-28

## 2019-09-06 NOTE — TELEPHONE ENCOUNTER
Pt reports current BP readings 140-160/80-90.   Per Dr Ernst - increase Lisinopril to 10 mg BID.    Pt update with POC - will call end to next week with update.

## 2019-09-13 ENCOUNTER — MYC MEDICAL ADVICE (OUTPATIENT)
Dept: FAMILY MEDICINE | Facility: CLINIC | Age: 64
End: 2019-09-13

## 2019-09-13 DIAGNOSIS — E11.69 TYPE 2 DIABETES MELLITUS WITH OTHER SPECIFIED COMPLICATION, WITHOUT LONG-TERM CURRENT USE OF INSULIN (H): Primary | ICD-10-CM

## 2019-09-16 ENCOUNTER — TELEPHONE (OUTPATIENT)
Dept: TRANSPLANT | Facility: CLINIC | Age: 64
End: 2019-09-16

## 2019-09-18 ENCOUNTER — TELEPHONE (OUTPATIENT)
Dept: TRANSPLANT | Facility: CLINIC | Age: 64
End: 2019-09-18

## 2019-09-18 DIAGNOSIS — Z76.82 ORGAN TRANSPLANT CANDIDATE: ICD-10-CM

## 2019-09-18 DIAGNOSIS — N18.6 ESRD (END STAGE RENAL DISEASE) (H): ICD-10-CM

## 2019-09-18 NOTE — TELEPHONE ENCOUNTER
Patient called to report BP's 145/81 for 3 days 149/89 for two days 168/89 for one day 148/84 for one day 136/86 for one day today 138/78. Patient is waiting to  meds until he hears from coordinator just in case the dose changes based on his BP readings.

## 2019-09-18 NOTE — TELEPHONE ENCOUNTER
Returned call to patient. Confirmed current BP medications and doses: lisinopril 10mg BID and clonidine 0.1mg twice a week on Monday and Fridays.     Plan per  during July clinic visit: Hypertension.  His blood pressure is currently controlled on hydralazine 100 mg 3 times a day, lisinopril 5 mg, Norvasc 10 mg, and Clonidine 0.1 mg daily. In fact, he occasional has low blood pressure. Will try to wean his clonidine - decrease it to 0.1 mg every other day for 4 weeks, twice a week for two weeks, and then once a week for two weeks.    Message sent to Dr. Ernst regarding blood pressure update and if changes need to be made at this time. Will connect with patient once there is a plan. Pt states he has a good supply of lisinopril at this time.

## 2019-09-23 ENCOUNTER — ANCILLARY PROCEDURE (OUTPATIENT)
Dept: ULTRASOUND IMAGING | Facility: CLINIC | Age: 64
End: 2019-09-23
Attending: INTERNAL MEDICINE
Payer: MEDICARE

## 2019-09-23 ENCOUNTER — TELEPHONE (OUTPATIENT)
Dept: TRANSPLANT | Facility: CLINIC | Age: 64
End: 2019-09-23

## 2019-09-23 ENCOUNTER — TELEPHONE (OUTPATIENT)
Dept: NEPHROLOGY | Facility: CLINIC | Age: 64
End: 2019-09-23

## 2019-09-23 ENCOUNTER — RESULTS ONLY (OUTPATIENT)
Dept: OTHER | Facility: CLINIC | Age: 64
End: 2019-09-23

## 2019-09-23 DIAGNOSIS — Z99.2 ESRD ON DIALYSIS (H): Primary | ICD-10-CM

## 2019-09-23 DIAGNOSIS — N18.6 ESRD (END STAGE RENAL DISEASE) (H): ICD-10-CM

## 2019-09-23 DIAGNOSIS — T82.9XXA COMPLICATION OF VASCULAR ACCESS FOR DIALYSIS, INITIAL ENCOUNTER: ICD-10-CM

## 2019-09-23 DIAGNOSIS — I71.40 AAA (ABDOMINAL AORTIC ANEURYSM) (H): ICD-10-CM

## 2019-09-23 DIAGNOSIS — Z94.1 HISTORY OF HEART TRANSPLANT (H): ICD-10-CM

## 2019-09-23 DIAGNOSIS — T82.898A OTHER SPECIFIED COMPLICATION OF VASCULAR PROSTHETIC DEVICES, IMPLANTS AND GRAFTS, INITIAL ENCOUNTER (H): ICD-10-CM

## 2019-09-23 DIAGNOSIS — T82.9XXA COMPLICATION OF DIALYSIS ACCESS INSERTION, INITIAL ENCOUNTER: ICD-10-CM

## 2019-09-23 DIAGNOSIS — E11.69 TYPE 2 DIABETES MELLITUS WITH OTHER SPECIFIED COMPLICATION, WITHOUT LONG-TERM CURRENT USE OF INSULIN (H): ICD-10-CM

## 2019-09-23 DIAGNOSIS — Z76.82 ORGAN TRANSPLANT CANDIDATE: ICD-10-CM

## 2019-09-23 DIAGNOSIS — N18.6 ESRD ON DIALYSIS (H): Primary | ICD-10-CM

## 2019-09-23 LAB
HBA1C MFR BLD: 5.6 % (ref 0–5.6)
RADIOLOGIST FLAGS: ABNORMAL

## 2019-09-23 PROCEDURE — 86832 HLA CLASS I HIGH DEFIN QUAL: CPT | Performed by: SURGERY

## 2019-09-23 PROCEDURE — 86833 HLA CLASS II HIGH DEFIN QUAL: CPT | Performed by: SURGERY

## 2019-09-23 NOTE — TELEPHONE ENCOUNTER
Vickie Bryson LPN 42 minutes ago (10:49 AM)   Received call about abnormal finding, will route to VERONICA Flores.  Vickie Bryson LPN  Nephrology  [phone]    Radiologist flags Rad-Urgent Abnormal (Urgent)   Worsening arterial and venous anastomotic stenoses     Writer reviewed LUE US of AV graft result today, that showed   1. Worsening arterial anastomotic stenosis, now 2.2 mm diameter - previously 3 x 5 mm.  2. Worsening venous anastomotic stenosis, now 3.2 mm diameter - previously 6.7 mm.  3. Otherwise patent left brachial to axillary bovine graft.  4. Fluid collection adjacent to the graft in the antecubital fossa is smaller.  Writer called [facility] and spoke with the charge RN, who reports that LUE AV graft inconsistently cannulated with difficulty over last a couple of months. In some occasions that requires multiple cannulations and some time only needs 2 needle sticks. KT/V is gradually declining. Writer recommended fistulogram of LUE AV graft per LUE US of AV graft findings, a  will contact patient for appt. Dialysis RN verbalized acceptance and understanding of plan.   Writer called patient and left VM to call back.

## 2019-09-23 NOTE — TELEPHONE ENCOUNTER
Received call about abnormal finding, will route to VERONICA Flores.  Vickie Bryson LPN  Nephrology  [phone]      Radiologist flags Rad-Urgent Abnormal (Urgent)   Worsening arterial and venous anastomotic stenoses.

## 2019-09-24 ENCOUNTER — TELEPHONE (OUTPATIENT)
Dept: INTERVENTIONAL RADIOLOGY/VASCULAR | Facility: CLINIC | Age: 64
End: 2019-09-24

## 2019-09-24 ENCOUNTER — OFFICE VISIT (OUTPATIENT)
Dept: NEPHROLOGY | Facility: CLINIC | Age: 64
End: 2019-09-24
Attending: INTERNAL MEDICINE
Payer: MEDICARE

## 2019-09-24 ENCOUNTER — HOSPITAL ENCOUNTER (OUTPATIENT)
Facility: CLINIC | Age: 64
End: 2019-09-24
Admitting: RADIOLOGY
Payer: MEDICARE

## 2019-09-24 ENCOUNTER — ALLIED HEALTH/NURSE VISIT (OUTPATIENT)
Dept: TRANSPLANT | Facility: CLINIC | Age: 64
End: 2019-09-24
Attending: INTERNAL MEDICINE
Payer: MEDICARE

## 2019-09-24 VITALS
RESPIRATION RATE: 18 BRPM | TEMPERATURE: 98.8 F | HEART RATE: 87 BPM | WEIGHT: 164 LBS | SYSTOLIC BLOOD PRESSURE: 122 MMHG | DIASTOLIC BLOOD PRESSURE: 71 MMHG | BODY MASS INDEX: 24.86 KG/M2 | HEIGHT: 68 IN | OXYGEN SATURATION: 100 %

## 2019-09-24 DIAGNOSIS — Z23 NEED FOR INFLUENZA VACCINATION: Primary | ICD-10-CM

## 2019-09-24 DIAGNOSIS — Z94.1 HEART REPLACED BY TRANSPLANT (H): ICD-10-CM

## 2019-09-24 DIAGNOSIS — D47.2 MGUS (MONOCLONAL GAMMOPATHY OF UNKNOWN SIGNIFICANCE): ICD-10-CM

## 2019-09-24 DIAGNOSIS — D84.9 IMMUNOSUPPRESSION (H): ICD-10-CM

## 2019-09-24 DIAGNOSIS — N18.6 ESRD (END STAGE RENAL DISEASE) (H): ICD-10-CM

## 2019-09-24 DIAGNOSIS — Z76.82 AWAITING ORGAN TRANSPLANT: Primary | ICD-10-CM

## 2019-09-24 PROCEDURE — G0463 HOSPITAL OUTPT CLINIC VISIT: HCPCS | Mod: 25,ZF

## 2019-09-24 PROCEDURE — G0008 ADMIN INFLUENZA VIRUS VAC: HCPCS | Mod: ZF

## 2019-09-24 PROCEDURE — 25000128 H RX IP 250 OP 636: Mod: ZF | Performed by: PHYSICIAN ASSISTANT

## 2019-09-24 PROCEDURE — 90682 RIV4 VACC RECOMBINANT DNA IM: CPT | Mod: ZF | Performed by: PHYSICIAN ASSISTANT

## 2019-09-24 RX ORDER — LIDOCAINE 40 MG/G
CREAM TOPICAL
Status: CANCELLED | OUTPATIENT
Start: 2019-09-24

## 2019-09-24 RX ORDER — LIDOCAINE/PRILOCAINE 2.5 %-2.5%
CREAM (GRAM) TOPICAL
Refills: 0 | Status: ON HOLD | COMMUNITY
Start: 2019-07-10 | End: 2019-12-22

## 2019-09-24 RX ORDER — DEXTROSE MONOHYDRATE 25 G/50ML
25-50 INJECTION, SOLUTION INTRAVENOUS
Status: CANCELLED | OUTPATIENT
Start: 2019-09-24

## 2019-09-24 RX ORDER — HEPARIN SOD,PORCINE/0.9 % NACL 5K/1000 ML
1000-10000 INTRAVENOUS SOLUTION INTRAVENOUS
Status: CANCELLED | OUTPATIENT
Start: 2019-09-24

## 2019-09-24 RX ORDER — NICOTINE POLACRILEX 4 MG
15-30 LOZENGE BUCCAL
Status: CANCELLED | OUTPATIENT
Start: 2019-09-24

## 2019-09-24 RX ORDER — SODIUM CHLORIDE 9 MG/ML
INJECTION, SOLUTION INTRAVENOUS CONTINUOUS
Status: CANCELLED | OUTPATIENT
Start: 2019-09-24

## 2019-09-24 RX ADMIN — INFLUENZA A VIRUS A/BRISBANE/02/2018 (H1N1) RECOMBINANT HEMAGGLUTININ ANTIGEN, INFLUENZA A VIRUS A/KANSAS/14/2017 (H3N2) RECOMBINANT HEMAGGLUTININ ANTIGEN, INFLUENZA B VIRUS B/PHUKET/3073/2013 RECOMBINANT HEMAGGLUTININ ANTIGEN, AND INFLUENZA B VIRUS B/MARYLAND/15/2016 RECOMBINANT HEMAGGLUTININ ANTIGEN 0.5 ML: 45; 45; 45; 45 INJECTION INTRAMUSCULAR at 15:56

## 2019-09-24 ASSESSMENT — PAIN SCALES - GENERAL: PAINLEVEL: MODERATE PAIN (4)

## 2019-09-24 ASSESSMENT — MIFFLIN-ST. JEOR: SCORE: 1503.91

## 2019-09-24 NOTE — NURSING NOTE
"Chief Complaint   Patient presents with     RECHECK     Kidney tx waitlist       Vital signs:  Temp: 98.8  F (37.1  C) Temp src: Oral BP: 122/71 Pulse: 87   Resp: 18 SpO2: 100 %     Height: 172 cm (5' 7.72\")(shoe off) Weight: 74.4 kg (164 lb)  Estimated body mass index is 25.14 kg/m  as calculated from the following:    Height as of this encounter: 1.72 m (5' 7.72\").    Weight as of this encounter: 74.4 kg (164 lb).          Mago Duarte, [facility]  9/24/2019 3:20 PM      "
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
## 2019-09-24 NOTE — PATIENT INSTRUCTIONS
Preventive Care:    Diabetic Eye Exam Screening: During our visit today, we discussed that it is recommended you receive diabetic eye exam screening. Please call or make an appointment with your primary care provider to discuss this with them. You may also call the [facility] scheduling line ([phone]) to set up an eye exam at one of the [facility].

## 2019-09-24 NOTE — PROGRESS NOTES
Patient Name: Murray Nicholson  : 1955  Age: 64 year old  MRN: [id number]  Date of Initial Social Work Evaluation: 2018    Patient on kidney transplant wait list.  Saw today to update psychosocial assessment.      Presenting Information   Living Situation: Murray lives in an apartment alone in [place], [place].  If not local, plans for short term stay: n/a  Previous Functional Status: Independent with ADL's  Cultural/Language/Spiritual Considerations: None identified at this time    Support System  Primary Support Person Friend Naresh  Other support:  Geovanni Verde and Mack, both live local  Plan for support in immediate post-hospital period: Friend and geovanni    Health Care Directive  Decision Maker: self  Alternate Decision Maker: Jasper Ayala  Health Care Directive: Copy in Chart    Mental Health/Coping:   History of Mental Health: Denied  History of Chemical Health: Denied  Current status: Patient denied any current mental health or chemical health concerns.   Coping: Patient appears to be coping well  Services Needed/Recommended: None identified at this time    Financial   Income: Patient is on social security disability  Impact of transplant on income: None identified at this time  Insurance and medication coverage: Medicare, Cigna Supplement and Aetna Part D  Financial concerns: Concerns [facility]  Resources needed: Depending on cost of Valcyte, patient may benefit from [employer] patient assistance program.     Assessment and recommendations and plan:  Patient had a heart transplant 2018. He has been on dialysis since 2018. Reviewed transplant education (Medicare, rehabilitation, donor issues, community/financial resources, and psych/family adjustment) as well as psychosocial risks of transplant. Provided patient with a copy of post-transplant informational sheet that includes information on potential costs of medications, Medicare ESRD,  post-transplant lodging, etc. Patient seemed to process information well. Appeared well informed, motivated, and able to follow post transplant requirements. Behavior was appropriate during interview. Has adequate income and insurance coverage. Adequate social support. No major contraindications noted for transplant. At this time, patient appears to understand the risks and benefits of transplant.     Sil Perez [facility]    Kidney/Pancreas/Auto Islet Transplant Programs

## 2019-09-25 ENCOUNTER — APPOINTMENT (OUTPATIENT)
Dept: INTERVENTIONAL RADIOLOGY/VASCULAR | Facility: CLINIC | Age: 64
End: 2019-09-25
Attending: CLINICAL NURSE SPECIALIST
Payer: MEDICARE

## 2019-09-25 ENCOUNTER — HOSPITAL ENCOUNTER (OUTPATIENT)
Facility: CLINIC | Age: 64
Discharge: HOME | End: 2019-09-25
Payer: MEDICARE

## 2019-09-25 ENCOUNTER — APPOINTMENT (OUTPATIENT)
Dept: MEDSURG UNIT | Facility: CLINIC | Age: 64
End: 2019-09-25
Payer: MEDICARE

## 2019-09-25 VITALS
OXYGEN SATURATION: 100 % | DIASTOLIC BLOOD PRESSURE: 73 MMHG | HEART RATE: 86 BPM | RESPIRATION RATE: 16 BRPM | SYSTOLIC BLOOD PRESSURE: 127 MMHG | TEMPERATURE: 98.3 F

## 2019-09-25 DIAGNOSIS — T82.9XXA COMPLICATION OF VASCULAR ACCESS FOR DIALYSIS, INITIAL ENCOUNTER: ICD-10-CM

## 2019-09-25 DIAGNOSIS — Z99.2 ESRD ON DIALYSIS (H): ICD-10-CM

## 2019-09-25 DIAGNOSIS — Z94.1 HEART REPLACED BY TRANSPLANT (H): ICD-10-CM

## 2019-09-25 DIAGNOSIS — N18.6 ESRD ON DIALYSIS (H): ICD-10-CM

## 2019-09-25 LAB
ERYTHROCYTE [DISTWIDTH] IN BLOOD BY AUTOMATED COUNT: 14.3 % (ref 10–15)
HCT VFR BLD AUTO: 31.9 % (ref 40–53)
HGB BLD-MCNC: 9.9 G/DL (ref 13.3–17.7)
INR PPP: 1.13 (ref 0.86–1.14)
MCH RBC QN AUTO: 30.7 PG (ref 26.5–33)
MCHC RBC AUTO-ENTMCNC: 31 G/DL (ref 31.5–36.5)
MCV RBC AUTO: 99 FL (ref 78–100)
PLATELET # BLD AUTO: 206 10E9/L (ref 150–450)
POTASSIUM SERPL-SCNC: 4.8 MMOL/L (ref 3.4–5.3)
RBC # BLD AUTO: 3.23 10E12/L (ref 4.4–5.9)
WBC # BLD AUTO: 2.5 10E9/L (ref 4–11)

## 2019-09-25 PROCEDURE — C1887 CATHETER, GUIDING: HCPCS

## 2019-09-25 PROCEDURE — 25000128 H RX IP 250 OP 636: Performed by: STUDENT IN AN ORGANIZED HEALTH CARE EDUCATION/TRAINING PROGRAM

## 2019-09-25 PROCEDURE — 25000125 ZZHC RX 250: Performed by: STUDENT IN AN ORGANIZED HEALTH CARE EDUCATION/TRAINING PROGRAM

## 2019-09-25 PROCEDURE — C1769 GUIDE WIRE: HCPCS

## 2019-09-25 PROCEDURE — 36902 INTRO CATH DIALYSIS CIRCUIT: CPT

## 2019-09-25 PROCEDURE — 27211134 ZZH SHEATH CR2

## 2019-09-25 PROCEDURE — 85610 PROTHROMBIN TIME: CPT | Performed by: RADIOLOGY

## 2019-09-25 PROCEDURE — 27210888 ZZH ACCESSORY CR7

## 2019-09-25 PROCEDURE — 25500064 ZZH RX 255 OP 636: Performed by: RADIOLOGY

## 2019-09-25 PROCEDURE — 99153 MOD SED SAME PHYS/QHP EA: CPT

## 2019-09-25 PROCEDURE — 40000166 ZZH STATISTIC PP CARE STAGE 1

## 2019-09-25 PROCEDURE — 25800030 ZZH RX IP 258 OP 636: Performed by: RADIOLOGY

## 2019-09-25 PROCEDURE — C1725 CATH, TRANSLUMIN NON-LASER: HCPCS

## 2019-09-25 PROCEDURE — 85027 COMPLETE CBC AUTOMATED: CPT | Performed by: RADIOLOGY

## 2019-09-25 PROCEDURE — 99152 MOD SED SAME PHYS/QHP 5/>YRS: CPT

## 2019-09-25 PROCEDURE — 27210908 ZZH NEEDLE CR4

## 2019-09-25 PROCEDURE — 25000128 H RX IP 250 OP 636: Performed by: RADIOLOGY

## 2019-09-25 PROCEDURE — 27210845 ZZH DEVICE INFLATION CR5

## 2019-09-25 PROCEDURE — 27210732 ZZH ACCESSORY CR1

## 2019-09-25 PROCEDURE — 84132 ASSAY OF SERUM POTASSIUM: CPT | Performed by: RADIOLOGY

## 2019-09-25 RX ORDER — NALOXONE HYDROCHLORIDE 0.4 MG/ML
.1-.4 INJECTION, SOLUTION INTRAMUSCULAR; INTRAVENOUS; SUBCUTANEOUS
Status: DISCONTINUED | OUTPATIENT
Start: 2019-09-25 | End: 2019-09-25 | Stop reason: HOSPADM

## 2019-09-25 RX ORDER — SODIUM CHLORIDE 9 MG/ML
INJECTION, SOLUTION INTRAVENOUS CONTINUOUS
Status: DISCONTINUED | OUTPATIENT
Start: 2019-09-25 | End: 2019-09-25 | Stop reason: HOSPADM

## 2019-09-25 RX ORDER — DEXTROSE MONOHYDRATE 25 G/50ML
25-50 INJECTION, SOLUTION INTRAVENOUS
Status: DISCONTINUED | OUTPATIENT
Start: 2019-09-25 | End: 2019-09-25 | Stop reason: HOSPADM

## 2019-09-25 RX ORDER — HEPARIN SOD,PORCINE/0.9 % NACL 5K/1000 ML
1000-10000 INTRAVENOUS SOLUTION INTRAVENOUS
Status: COMPLETED | OUTPATIENT
Start: 2019-09-25 | End: 2019-09-25

## 2019-09-25 RX ORDER — IODIXANOL 320 MG/ML
100 INJECTION, SOLUTION INTRAVASCULAR ONCE
Status: COMPLETED | OUTPATIENT
Start: 2019-09-25 | End: 2019-09-25

## 2019-09-25 RX ORDER — FENTANYL CITRATE 50 UG/ML
25-50 INJECTION, SOLUTION INTRAMUSCULAR; INTRAVENOUS EVERY 5 MIN PRN
Status: DISCONTINUED | OUTPATIENT
Start: 2019-09-25 | End: 2019-09-25 | Stop reason: HOSPADM

## 2019-09-25 RX ORDER — LIDOCAINE 40 MG/G
CREAM TOPICAL
Status: DISCONTINUED | OUTPATIENT
Start: 2019-09-25 | End: 2019-09-25 | Stop reason: HOSPADM

## 2019-09-25 RX ORDER — NICOTINE POLACRILEX 4 MG
15-30 LOZENGE BUCCAL
Status: DISCONTINUED | OUTPATIENT
Start: 2019-09-25 | End: 2019-09-25 | Stop reason: HOSPADM

## 2019-09-25 RX ORDER — FLUMAZENIL 0.1 MG/ML
0.2 INJECTION, SOLUTION INTRAVENOUS
Status: DISCONTINUED | OUTPATIENT
Start: 2019-09-25 | End: 2019-09-25 | Stop reason: HOSPADM

## 2019-09-25 RX ADMIN — MIDAZOLAM 0.5 MG: 1 INJECTION INTRAMUSCULAR; INTRAVENOUS at 14:11

## 2019-09-25 RX ADMIN — FENTANYL CITRATE 25 MCG: 50 INJECTION INTRAMUSCULAR; INTRAVENOUS at 14:03

## 2019-09-25 RX ADMIN — FENTANYL CITRATE 25 MCG: 50 INJECTION INTRAMUSCULAR; INTRAVENOUS at 14:38

## 2019-09-25 RX ADMIN — FENTANYL CITRATE 25 MCG: 50 INJECTION INTRAMUSCULAR; INTRAVENOUS at 14:11

## 2019-09-25 RX ADMIN — MIDAZOLAM 0.5 MG: 1 INJECTION INTRAMUSCULAR; INTRAVENOUS at 14:03

## 2019-09-25 RX ADMIN — FENTANYL CITRATE 50 MCG: 50 INJECTION INTRAMUSCULAR; INTRAVENOUS at 14:15

## 2019-09-25 RX ADMIN — MIDAZOLAM 1 MG: 1 INJECTION INTRAMUSCULAR; INTRAVENOUS at 14:38

## 2019-09-25 RX ADMIN — HEPARIN SODIUM 5000 UNITS: 1000 INJECTION, SOLUTION INTRAVENOUS; SUBCUTANEOUS at 14:11

## 2019-09-25 RX ADMIN — IODIXANOL 50 ML: 320 INJECTION, SOLUTION INTRAVASCULAR at 15:12

## 2019-09-25 RX ADMIN — LIDOCAINE HYDROCHLORIDE 10 ML: 10 INJECTION, SOLUTION EPIDURAL; INFILTRATION; INTRACAUDAL; PERINEURAL at 13:57

## 2019-09-25 RX ADMIN — FENTANYL CITRATE 50 MCG: 50 INJECTION INTRAMUSCULAR; INTRAVENOUS at 13:55

## 2019-09-25 RX ADMIN — MIDAZOLAM 1 MG: 1 INJECTION INTRAMUSCULAR; INTRAVENOUS at 13:55

## 2019-09-25 NOTE — PRE-PROCEDURE
GENERAL PRE-PROCEDURE:   Procedure:  Fistulogram, plasty  Date/Time:  9/25/2019 1:08 PM    Written consent obtained?: Yes    Risks and benefits: Risks, benefits and alternatives were discussed    Consent given by:  Patient  Patient states understanding of procedure being performed: Yes    Patient's understanding of procedure matches consent: Yes    Procedure consent matches procedure scheduled: Yes    Expected level of sedation:  Moderate  Appropriately NPO:  Yes  ASA Class:  Class 2- mild systemic disease, no acute problems, no functional limitations  Mallampati  :  Grade 2- soft palate, base of uvula, tonsillar pillars, and portion of posterior pharyngeal wall visible  Lungs:  Lungs clear with good breath sounds bilaterally  Heart:  Normal heart sounds and rate  History & Physical reviewed:  History and physical reviewed and no updates needed  Statement of review:  I have reviewed the lab findings, diagnostic data, medications, and the plan for sedation

## 2019-09-25 NOTE — DISCHARGE INSTRUCTIONS
[facility]      Interventional Radiology  Discharge Instructions Following Fistulogram      ? You may resume normal activities as tolerated, but avoid any strenuous activity or heavy lifting (>10 lbs.) involving your access arm.    ? Elevate and rest your arm as much as possible for the first 24 hours after the procedure.  This will promote healing and reduce swelling.     ? If bleeding or oozing should occur, apply fingertip pressure to puncture site to control bleeding, but avoid excessive pressure as this may clot off the fistula.  Hold light pressure for 10 minutes, or until bleeding/oozing is controlled.  If excessive bleeding is noted or if you are having difficulty controlling the bleeding with direct pressure, call 911.     ? If you develop fever, chills, excessive pain/tenderness or drainage at the puncture site, or have questions call your Doctor or Dialysis Center.     ? If you received sedation for your procedure: An adult should stay with you for 24 hours, Do Not drive a motor vehicle, operate machinery, or drink alcoholic beverages for 24 hours.     ? You may resume your normal medications immediately    ? If you notice sudden loss of pulse or thrill (buzzing feeling) in your fistula, contact your Doctor or Dialysis unit immediately.         [facility] INTERVENTIONAL RADIOLOGY DEPARTMENT  Procedure Physician:         Dr. Seay                           Date of procedure: September 25, 2019  Telephone Numbers: [phone] Monday-Friday 8:00 am to 4:30 pm  [phone] After 4:30 pm Monday-Friday, Weekends & Holidays.   Ask for the Interventional Radiologist on call.  Someone is on call 24 hrs/day  [facility] toll free number: [phone] Monday-Friday 8:00 am to 4:30 pm  [facility] Emergency Dept: [phone]

## 2019-09-25 NOTE — PROGRESS NOTES
Interventional Radiology Intra-procedural Nursing Note    Patient Name: Murray Nicholson  Medical Record Number: [id number]  Today's Date: September 25, 2019    Start Time: 1355  End of procedure time: 1505  Procedure: Left upper extremity fistulagram with intervention.  Fire Safety Score: 2    Consent Review/Timeout Performed by: Dr. Jordan Seay  Procedure Performed By: Dr. Jordan Seay, Dr. Alex Lopez    Fentanyl administered: 175 mcg  Versed administered: 3 mg    Start of Sedation Time: 1355  End of Sedation Time: 1505  Total Sedation Time: 70 mintues    Procedure start time: 1355  Puncture time: 1357  Procedure end time: 70 minutes    Report given to: Mary JIMENEZ 2A  Time pt departs:  1520  : NA    Other Notes:  Alert male transported via cart from 2A to IR Procedure Room 5 for planned intervention.  ID band confirmed and patient acknowledges understanding of planned procedure. Patient repositioned to procedure table via hover-mat and positioned supine.  Patient prepped and draped per policy see VS flowsheet, MAR for further information.       Left upper extremity graft site accessed x 2.  Procedure performed with plasty of vessels.  For complete details, please see Providers procedure note from event.    Post procedure left access site x 2 is cleanse and dressed with tip-stop.    Patient condition post procedure is stable.   Patient returned to 2A for post-procedure monitoring.    Scarlet Short RN

## 2019-09-25 NOTE — IP AVS SNAPSHOT
MRN:[id number]                      After Visit Summary   9/25/2019    Murray Nicholson    MRN: [id number]           Visit Information        Department      9/25/2019 11:02 AM [facility], [place], Interventional Radiology          Review of your medicines      UNREVIEWED medicines. Ask your doctor about these medicines       Dose / Directions   acetaminophen 500 MG tablet  Commonly known as:  TYLENOL  Used for:  Ileostomy status (H)      Dose:  1,000 mg  Take 2 tablets (1,000 mg) by mouth 4 times daily  Quantity:  60 tablet  Refills:  1     amLODIPine 10 MG tablet  Commonly known as:  NORVASC  Used for:  Hypertension goal BP (blood pressure) < 130/80      Dose:  10 mg  Take 1 tablet (10 mg) by mouth daily  Quantity:  90 tablet  Refills:  3     aspirin 81 MG EC tablet      Dose:  81 mg  Take 81 mg by mouth daily  Refills:  0     atorvastatin 40 MG tablet  Commonly known as:  LIPITOR  Used for:  Heart replaced by transplant (H)      Dose:  40 mg  Take 1 tablet (40 mg) by mouth daily  Quantity:  90 tablet  Refills:  3     biotin 5 MG Caps  Commonly known as:  BIOTIN 5000  Used for:  Brittle nails      Dose:  5 mg  Take 5 mg by mouth daily  Quantity:  30 capsule  Refills:  3     cloNIDine 0.1 MG tablet  Commonly known as:  CATAPRES  Used for:  Essential hypertension, Heart replaced by transplant (H)      Once a week; non-dialysis  Quantity:  12 tablet  Refills:  3     fluticasone 50 MCG/ACT nasal spray  Commonly known as:  FLONASE  Used for:  Runny nose      Dose:  1 spray  Spray 1 spray into both nostrils daily  Quantity:  11.1 mL  Refills:  11     hydrALAZINE 100 MG tablet  Commonly known as:  APRESOLINE  Used for:  Essential hypertension      Dose:  100 mg  Take 1 tablet (100 mg) by mouth every 8 hours  Quantity:  90 tablet  Refills:  11     lidocaine-prilocaine 2.5-2.5 % external cream  Commonly known as:  EMLA      apply 1 hour prior to dialysis  Refills:  0     lisinopril 10 MG tablet  Commonly known as:   PRINIVIL/ZESTRIL  Indication:  High Blood Pressure Disorder  Used for:  Hypertension goal BP (blood pressure) < 130/80      Dose:  10 mg  Take 1 tablet (10 mg) by mouth 2 times daily  Quantity:  180 tablet  Refills:  3     loratadine 10 MG tablet  Commonly known as:  CLARITIN  Used for:  Hypertensive cardiopathy, SOB (shortness of breath)      Dose:  10 mg  Take 10 mg by mouth daily as needed Reported on 5/3/2017  Refills:  0     melatonin 1 MG Tabs tablet  Used for:  Heart transplanted (H)      Dose:  2 mg  Take 2 tablets (2 mg) by mouth nightly as needed for sleep  Quantity:  120 tablet  Refills:  1     multivitamin RENAL 1 MG capsule  Used for:  End stage renal disease (H)      Dose:  1 capsule  Take 1 capsule by mouth daily  Quantity:  120 capsule  Refills:  0     mycophenolate 250 MG capsule  Commonly known as:  GENERIC EQUIVALENT  Used for:  Heart transplanted (H)      Dose:  750 mg  Take 3 capsules (750 mg) by mouth 2 times daily  Quantity:  180 capsule  Refills:  11     pantoprazole 40 MG EC tablet  Commonly known as:  PROTONIX  Indication:  GI prophylaxis  Used for:  Duodenitis      Dose:  40 mg  Take 1 tablet (40 mg) by mouth 2 times daily (before meals)  Quantity:  60 tablet  Refills:  11     tacrolimus 1 MG capsule  Commonly known as:  GENERIC EQUIVALENT  Used for:  Heart transplant recipient (H)      Dose:  3 mg  Take 3 capsules (3 mg) by mouth 2 times daily  Quantity:  180 capsule  Refills:  11     traMADol 50 MG tablet  Commonly known as:  ULTRAM      Dose:  50 mg  Take 50 mg by mouth every 6 hours as needed for severe pain  Refills:  0        CONTINUE these medicines which have NOT CHANGED       Dose / Directions   blood glucose monitoring lancets  Used for:  Type 2 diabetes mellitus with stage 5 chronic kidney disease not on chronic dialysis, without long-term current use of insulin (H)      USE TO TEST 2-3 TIMES DAILY OR AS DIRECTED.  Quantity:  102 each  Refills:  0              Protect others  around you: Learn how to safely use, store and throw away your medicines at www.disposemymeds.org.       Follow-ups after your visit       Your next 10 appointments already scheduled    Sep 27, 2019  8:40 AM CDT  Office Visit with Yahir Turcios MD  [facility] ([facility]) [address] [id number] [phone]   Bring a current list of meds and any records pertaining to this visit.  For Physicals, please bring immunization records and any forms needing to be filled out. Please arrive 10 minutes early to complete paperwork.     Sep 30, 2019 11:45 AM CDT  (Arrive by 11:30 AM)  Return Visit with Abram Moulton MD  [facility] Sports Medicine ([facility]) [address] [place] [id number] [phone]      Oct 25, 2019 10:30 AM CDT  (Arrive by 10:15 AM)  Return Visit with Jennifer Reid PA-C  [facility] Dermatology ([facility]) [address] [place] [id number] [phone]      Oct 28, 2019  8:00 AM CDT  LAB with UU LAB GOLD WAITING  [facility] ([facility]) [address] [address]   Please do not eat 10-12 hours before your appointment if you are coming in fasting for labs on lipids, cholesterol, or glucose (sugar). Does not apply to pregnant women. Water, tea and black coffee (with nothing added) is okay. Do not drink other fluids, diet soda or gum. If you have concerns about taking your medications, please send a message by clicking on Secure Messaging, Message Your Care Team.     Oct 28, 2019  8:30 AM CDT  Procedure - 2.5 hour with U2A ROOM 1  Unit 2A [facility] ([facility]) [address] [address]      Oct 28, 2019  Procedure with Cardiologist MD Sachi  [facility]  Heart Cath Lab ([facility]) [address] [address]  [phone]   [facility] is located on the corner of [address] and [address] on the [address]. It is easily accessible from virtually any point in the [place] area, via [address] and [address].   Oct 28, 2019  4:00 PM CDT  (Arrive by 3:45 PM)  RETURN HEART TRANSPLANT with Klever Ernst MD  [facility] ([facility]) [address]  [address]  [place] [address]  [phone]         Care Instructions       Further instructions from your care team         [facility]      Interventional Radiology  Discharge Instructions Following Fistulogram      ? You may resume normal activities as tolerated, but avoid any strenuous activity or heavy lifting (>10 lbs.) involving your access arm.    ? Elevate and rest your arm as much as possible for the first 24 hours after the procedure.  This will promote healing and reduce swelling.     ? If bleeding or oozing should occur, apply fingertip pressure to puncture site to control bleeding, but avoid excessive pressure as this may clot off the fistula.  Hold light pressure for 10 minutes, or until bleeding/oozing is controlled.  If excessive bleeding is noted or if you are having difficulty controlling the bleeding with direct pressure, call 911.     ? If you develop fever, chills, excessive pain/tenderness or drainage at the puncture site, or have questions call your Doctor or Dialysis Center.     ? If you received sedation for your procedure: An adult should stay with you for 24 hours, Do Not drive a motor vehicle, operate machinery, or drink alcoholic beverages for 24 hours.     ? You may resume your normal medications immediately    ? If you notice sudden loss of pulse or thrill (buzzing feeling) in your fistula, contact your Doctor or Dialysis unit immediately.         [facility] INTERVENTIONAL  RADIOLOGY DEPARTMENT  Procedure Physician:         Dr. Seay                           Date of procedure: September 25, 2019  Telephone Numbers: [phone] Monday-Friday 8:00 am to 4:30 pm  [phone] After 4:30 pm Monday-Friday, Weekends & Holidays.   Ask for the Interventional Radiologist on call.  Someone is on call 24 hrs/day  [facility] toll free number: [phone] Monday-Friday 8:00 am to 4:30 pm  [facility] Emergency Dept: [phone]            Additional Information About Your Visit       MyChart Information    [employer] gives you secure access to your electronic health record. If you see a primary care provider, you can also send messages to your care team and make appointments. If you have questions, please call your primary care clinic.  If you do not have a primary care provider, please call [phone] and they will assist you.       Care EveryWhere ID    This is your Care EveryWhere ID. This could be used by other organizations to access your [facility] medical records  [id number]       Your Vitals Were  Most recent update: 9/25/2019  3:12 PM    Blood Pressure   141/84   (BP Location: Right arm)    Pulse   85    Temperature   98.3  F (36.8  C) (Oral)    Respirations   12    Pulse Oximetry   97%          Primary Care Provider Office Phone # Fax #    Yahir Turcios [phone]      Equal Access to Services    [facility] : Hadii aad ku hadasho Soomaali, waaxda luqadaha, qaybta kaalmada mignon, jose ayala. So [facility] [phone].    ATENCIÓN: Si habla español, tiene a ortiz disposición servicios gratuitos de asistencia lingüística. Llame al [phone].    We comply with applicable federal civil rights laws and [place] laws. We do not discriminate on the basis of race, color, national origin, age, disability, sex, sexual orientation, or gender identity.           Thank you!    Thank you for choosing [facility] for your care. Our goal is always to provide you  with excellent care. Hearing back from our patients is one way we can continue to improve our services. Please take a few minutes to complete the written survey that you may receive in the mail after you visit with us. Thank you!            Medication List      Medications          Morning Afternoon Evening Bedtime As Needed    blood glucose monitoring lancets  INSTRUCTIONS:  USE TO TEST 2-3 TIMES DAILY OR AS DIRECTED.                       ASK your doctor about these medications          Morning Afternoon Evening Bedtime As Needed    acetaminophen 500 MG tablet  Also known as:  TYLENOL  INSTRUCTIONS:  Take 2 tablets (1,000 mg) by mouth 4 times daily                     amLODIPine 10 MG tablet  Also known as:  NORVASC  INSTRUCTIONS:  Take 1 tablet (10 mg) by mouth daily                     aspirin 81 MG EC tablet  INSTRUCTIONS:  Take 81 mg by mouth daily                     atorvastatin 40 MG tablet  Also known as:  LIPITOR  INSTRUCTIONS:  Take 1 tablet (40 mg) by mouth daily  Doctor's comments:  90 day supply if able.                     biotin 5 MG Caps  Also known as:  BIOTIN 5000  INSTRUCTIONS:  Take 5 mg by mouth daily                     cloNIDine 0.1 MG tablet  Also known as:  CATAPRES  INSTRUCTIONS:  Once a week; non-dialysis                     fluticasone 50 MCG/ACT nasal spray  Also known as:  FLONASE  INSTRUCTIONS:  Spray 1 spray into both nostrils daily  Doctor's comments:  Okay to dispense generic equivalent, other formulation, or similar medication covered by patient's insurance                     hydrALAZINE 100 MG tablet  Also known as:  APRESOLINE  INSTRUCTIONS:  Take 1 tablet (100 mg) by mouth every 8 hours                     lidocaine-prilocaine 2.5-2.5 % external cream  Also known as:  EMLA  INSTRUCTIONS:  apply 1 hour prior to dialysis                     lisinopril 10 MG tablet  Also known as:  PRINIVIL/ZESTRIL  INSTRUCTIONS:  Take 1 tablet (10 mg) by mouth 2 times daily  Doctor's comments:   Dose adjustment  Reason for med:  High Blood Pressure Disorder                     loratadine 10 MG tablet  Also known as:  CLARITIN  INSTRUCTIONS:  Take 10 mg by mouth daily as needed Reported on 5/3/2017                     melatonin 1 MG Tabs tablet  INSTRUCTIONS:  Take 2 tablets (2 mg) by mouth nightly as needed for sleep                     multivitamin RENAL 1 MG capsule  INSTRUCTIONS:  Take 1 capsule by mouth daily                     mycophenolate 250 MG capsule  Also known as:  GENERIC EQUIVALENT  INSTRUCTIONS:  Take 3 capsules (750 mg) by mouth 2 times daily                     pantoprazole 40 MG EC tablet  Also known as:  PROTONIX  INSTRUCTIONS:  Take 1 tablet (40 mg) by mouth 2 times daily (before meals)  Reason for med:  GI prophylaxis                     tacrolimus 1 MG capsule  Also known as:  GENERIC EQUIVALENT  INSTRUCTIONS:  Take 3 capsules (3 mg) by mouth 2 times daily                     traMADol 50 MG tablet  Also known as:  ULTRAM  INSTRUCTIONS:  Take 50 mg by mouth every 6 hours as needed for severe pain

## 2019-09-25 NOTE — IP AVS SNAPSHOT
[facility], [place], Interventional Radiology  [address] [address]  Phone:  [phone]                                    After Visit Summary   9/25/2019    Murray Nicholson    MRN: [id number]           After Visit Summary Signature Page    I have received my discharge instructions, and my questions have been answered. I have discussed any challenges I see with this plan with the nurse or doctor.    ..........................................................................................................................................  Patient/Patient Representative Signature      ..........................................................................................................................................  Patient Representative Print Name and Relationship to Patient    ..................................................               ................................................  Date                                   Time    ..........................................................................................................................................  Reviewed by Signature/Title    ...................................................              ..............................................  Date                                               Time          22EPIC Rev 08/18

## 2019-09-25 NOTE — PROGRESS NOTES
Discharge instructions given and pt voiced understanding. No scripts needed from pharmacy. Left upper arm site is soft and flat. No hematoma. Positive pulses, color, and warmth bilateral upper extremities. Positive thrill. Up walking in room. Ate well for lunch. No complaint of discomfort. Adequate for discharge. Discharge to home with family.

## 2019-09-25 NOTE — PROGRESS NOTES
Returned to unit following fistulogram. Left arm intact - Tip Stop x2 in place. Received sedation. Alert and orient. No complaint of nausea or discomfort. Respiratory status stable. Vital signs within normal limits.

## 2019-09-25 NOTE — PROGRESS NOTES
1210 Pt on 2a prepped for fistulogram. PIV placed and labs sent. H&P current. Pt's friend will pick pt up after procedure. Pt ready for consent.

## 2019-09-25 NOTE — PROCEDURES
[facility], [place]    Procedure: IR Procedure Note  Date/Time: 9/25/2019 3:16 PM  Performed by: Alex Lopez MD  Authorized by: Alex Lopez MD     UNIVERSAL PROTOCOL   Site Marked: Yes  Prior Images Obtained and Reviewed:  Yes  Required items: Required blood products, implants, devices and special equipment available    Patient identity confirmed:  Verbally with patient  Patient was reevaluated immediately before administering moderate or deep sedation or anesthesia  Confirmation Checklist:  Patient's identity using two indicators, relevant allergies, procedure was appropriate and matched the consent or emergent situation and correct equipment/implants were available  Time out: Immediately prior to the procedure a time out was called    Preparation: Patient was prepped and draped in usual sterile fashion       ANESTHESIA    Local Anesthetic: Lidocaine 1% without epinephrine      SEDATION    Patient Sedated: Yes    Sedation Type:  Moderate (conscious) sedation  Sedation:  Diazepam and fentanyl  Vital signs: Vital signs monitored during sedation    Fluoroscopy Time: 14 minute(s)  See dictated procedure note for full details.  Findings: Per report    Specimens: none    Complications: None    Condition: Stable    Plan: Per orders    PROCEDURE   Patient Tolerance:  Patient tolerated the procedure well with no immediate complications    Time of Sedation in Minutes by Physician:  75

## 2019-09-26 NOTE — PROGRESS NOTES
"      Murray Nicholson is a 64 year old male who presents to clinic today for the following health issues:    HPI   1: Patient states he has a lump under his belly button, Pt states he had a hernia operation several years ago and wonders if maybe it got undone. Patient states its painful usually when hes standing. It started about a week ago.      2: Patient also states he has some discomfort/ scab on his butt noticed it a couple of weeks, only feels it when his sitting down. No injury. May have had a similar rash in the past. This one state more like a blister. No exposure. No sti risks.    3: diabetes results-Patient would also like to know if not taking insulin is good for him.    4: voice change-? If allergies or something else was recommended to see allergy but would prefer to start with ent. Notes some mucous in his throat in the AM. Notes minimal cough. On lisinopril and raised to 20mg daily range of motion 10 recnelty but this did not seem to affect the symptoms. Also on protonix twice daily for gerd. Not sure if he needs this due to weigh loss.     On immunosuprrpesants related to heart transplant.     Reviewed and updated as needed this visit by Provider         Review of Systems no fever. Passing stools ok.          BP 90/48 (BP Location: Right arm, Patient Position: Sitting, Cuff Size: Adult Large)   Pulse 84   Temp 97.9  F (36.6  C) (Oral)   Resp 16   Ht 1.72 m (5' 7.72\")   Wt 73.5 kg (162 lb)   SpO2 99%   BMI 24.84 kg/m    Body mass index is 24.84 kg/m .  Physical Exam   Exam:  ent exam normal.   GENERAL APPEARANCE: healthy, alert and no distress  ABDOMEN: soft, nontender except over the apparent hernia mass at he midline incision below the naval. This is easily reducible.   SKIN: shallow 1cm ulcer at the right buttock where it his against hte left a few cm sup to the anus. on the right there are 2 more 5mm healing lesion at that appear somewhat similar. No sing infection.     1. Change of " voice  This could be alllergies, I doubt GERD given therapy. May be related to lisinopril.   - OTOLARYNGOLOGY REFERRAL    2. Incisional hernia, without obstruction or gangrene     - GENERAL SURG ADULT REFERRAL    3. Skin ulcer, limited to breakdown of skin (H)  ? Hsv. Doubt zoster. If not  Improving in 2 weeks to wound clinic. Will treat with topical antibiotic given immunosuppression.   - Varicella Zoster DNA PCR CSF or Skin Swab  - mupirocin (BACTROBAN) 2 % external ointment; Apply topically 3 times daily  Dispense: 15 g; Refill: 0  - HSV 1 and 2 DNA by PCR

## 2019-09-27 ENCOUNTER — OFFICE VISIT (OUTPATIENT)
Dept: FAMILY MEDICINE | Facility: CLINIC | Age: 64
End: 2019-09-27
Payer: MEDICARE

## 2019-09-27 VITALS
RESPIRATION RATE: 16 BRPM | DIASTOLIC BLOOD PRESSURE: 48 MMHG | BODY MASS INDEX: 24.55 KG/M2 | OXYGEN SATURATION: 99 % | SYSTOLIC BLOOD PRESSURE: 90 MMHG | TEMPERATURE: 97.9 F | HEART RATE: 84 BPM | WEIGHT: 162 LBS | HEIGHT: 68 IN

## 2019-09-27 DIAGNOSIS — L98.491 SKIN ULCER, LIMITED TO BREAKDOWN OF SKIN (H): ICD-10-CM

## 2019-09-27 DIAGNOSIS — R49.9 CHANGE OF VOICE: ICD-10-CM

## 2019-09-27 DIAGNOSIS — Z94.1 HEART TRANSPLANTED (H): ICD-10-CM

## 2019-09-27 DIAGNOSIS — K43.2 INCISIONAL HERNIA, WITHOUT OBSTRUCTION OR GANGRENE: Primary | ICD-10-CM

## 2019-09-27 DIAGNOSIS — B00.9 HERPES SIMPLEX VIRUS INFECTION: ICD-10-CM

## 2019-09-27 DIAGNOSIS — K21.9 GASTROESOPHAGEAL REFLUX DISEASE, ESOPHAGITIS PRESENCE NOT SPECIFIED: ICD-10-CM

## 2019-09-27 DIAGNOSIS — D84.9 IMMUNOSUPPRESSION (H): ICD-10-CM

## 2019-09-27 PROBLEM — B96.5 BACTEREMIA DUE TO PSEUDOMONAS: Status: RESOLVED | Noted: 2018-08-12 | Resolved: 2019-09-27

## 2019-09-27 PROBLEM — R78.81 BACTEREMIA DUE TO PSEUDOMONAS: Status: RESOLVED | Noted: 2018-08-12 | Resolved: 2019-09-27

## 2019-09-27 PROBLEM — Z93.2 ILEOSTOMY STATUS (H): Status: RESOLVED | Noted: 2019-03-27 | Resolved: 2019-09-27

## 2019-09-27 PROBLEM — I47.20 VENTRICULAR TACHYCARDIA (H): Status: ACTIVE | Noted: 2019-09-27

## 2019-09-27 PROBLEM — R50.9 FEVER: Status: RESOLVED | Noted: 2018-07-26 | Resolved: 2019-09-27

## 2019-09-27 PROBLEM — Z93.3 COLOSTOMY STATUS (H): Status: RESOLVED | Noted: 2018-12-11 | Resolved: 2019-09-27

## 2019-09-27 PROCEDURE — 36415 COLL VENOUS BLD VENIPUNCTURE: CPT | Performed by: FAMILY MEDICINE

## 2019-09-27 PROCEDURE — 87529 HSV DNA AMP PROBE: CPT | Performed by: FAMILY MEDICINE

## 2019-09-27 PROCEDURE — 87798 DETECT AGENT NOS DNA AMP: CPT | Performed by: FAMILY MEDICINE

## 2019-09-27 PROCEDURE — 99214 OFFICE O/P EST MOD 30 MIN: CPT | Performed by: FAMILY MEDICINE

## 2019-09-27 RX ORDER — MUPIROCIN 20 MG/G
OINTMENT TOPICAL 3 TIMES DAILY
Qty: 15 G | Refills: 0 | Status: SHIPPED | OUTPATIENT
Start: 2019-09-27 | End: 2020-02-19

## 2019-09-27 ASSESSMENT — MIFFLIN-ST. JEOR: SCORE: 1494.84

## 2019-09-28 LAB
HSV1 DNA SPEC QL NAA+PROBE: NEGATIVE
HSV2 DNA SPEC QL NAA+PROBE: POSITIVE
SPECIMEN SOURCE: ABNORMAL
SPECIMEN TYPE: NORMAL
VARICELLA ZOSTER DNA PCR COMMENT: NORMAL
VZV DNA SPEC QL NAA+PROBE: NORMAL

## 2019-09-29 ENCOUNTER — TELEPHONE (OUTPATIENT)
Dept: FAMILY MEDICINE | Facility: CLINIC | Age: 64
End: 2019-09-29

## 2019-09-29 RX ORDER — VALACYCLOVIR HYDROCHLORIDE 500 MG/1
500 TABLET, FILM COATED ORAL DAILY
Qty: 5 TABLET | Refills: 0 | Status: ON HOLD | OUTPATIENT
Start: 2019-09-29 | End: 2019-10-28

## 2019-09-29 NOTE — TELEPHONE ENCOUNTER
The wound test did show the herpes virus. Usually, we wouldn ot use medication if the sore has been present for 2 weeks but given you are on immunosuppressants,  I would recommend we use the medication. Valtrex 500mg once daily for 5 days. A prescription has been faxed to your pharmacy.     I will have my nurse try to call you with the results to make sure you get the message.    Yahir Turcios MD

## 2019-09-30 ENCOUNTER — OFFICE VISIT (OUTPATIENT)
Dept: ORTHOPEDICS | Facility: CLINIC | Age: 64
End: 2019-09-30
Payer: MEDICARE

## 2019-09-30 VITALS — BODY MASS INDEX: 24.55 KG/M2 | HEIGHT: 68 IN | WEIGHT: 162 LBS

## 2019-09-30 DIAGNOSIS — D47.2 MGUS (MONOCLONAL GAMMOPATHY OF UNKNOWN SIGNIFICANCE): ICD-10-CM

## 2019-09-30 DIAGNOSIS — M65.949 SYNOVITIS OF HAND: Primary | ICD-10-CM

## 2019-09-30 DIAGNOSIS — Z94.1 HEART TRANSPLANTED (H): ICD-10-CM

## 2019-09-30 DIAGNOSIS — Z87.39 H/O: GOUT: ICD-10-CM

## 2019-09-30 DIAGNOSIS — M65.949 SYNOVITIS OF HAND: ICD-10-CM

## 2019-09-30 DIAGNOSIS — M65.312 TRIGGER THUMB, LEFT THUMB: ICD-10-CM

## 2019-09-30 LAB
CRP SERPL-MCNC: <2.9 MG/L (ref 0–8)
ERYTHROCYTE [SEDIMENTATION RATE] IN BLOOD BY WESTERGREN METHOD: 24 MM/H (ref 0–20)

## 2019-09-30 PROCEDURE — 86039 ANTINUCLEAR ANTIBODIES (ANA): CPT | Performed by: FAMILY MEDICINE

## 2019-09-30 PROCEDURE — 86038 ANTINUCLEAR ANTIBODIES: CPT | Performed by: FAMILY MEDICINE

## 2019-09-30 PROCEDURE — 83883 ASSAY NEPHELOMETRY NOT SPEC: CPT | Performed by: FAMILY MEDICINE

## 2019-09-30 PROCEDURE — 84165 PROTEIN E-PHORESIS SERUM: CPT | Performed by: FAMILY MEDICINE

## 2019-09-30 PROCEDURE — 86431 RHEUMATOID FACTOR QUANT: CPT | Performed by: FAMILY MEDICINE

## 2019-09-30 PROCEDURE — 00000402 ZZHCL STATISTIC TOTAL PROTEIN: Performed by: FAMILY MEDICINE

## 2019-09-30 ASSESSMENT — MIFFLIN-ST. JEOR: SCORE: 1494.89

## 2019-09-30 ASSESSMENT — PAIN SCALES - GENERAL: PAINLEVEL: MILD PAIN (2)

## 2019-09-30 NOTE — TELEPHONE ENCOUNTER
LVM to call clinic back to give patient Dr. Turcios's message below.    Thanks! Janine Workman RN
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
## 2019-09-30 NOTE — TELEPHONE ENCOUNTER
Patient has been notified of the test results and the medication Valtrex.  He understands the therapy plan.  Nieves Cash RN

## 2019-10-01 LAB
ALBUMIN SERPL ELPH-MCNC: 4.6 G/DL (ref 3.7–5.1)
ALPHA1 GLOB SERPL ELPH-MCNC: 0.3 G/DL (ref 0.2–0.4)
ALPHA2 GLOB SERPL ELPH-MCNC: 0.7 G/DL (ref 0.5–0.9)
ANA PAT SER IF-IMP: ABNORMAL
ANA SER QL IF: ABNORMAL
ANA TITR SER IF: ABNORMAL {TITER}
B-GLOBULIN SERPL ELPH-MCNC: 0.6 G/DL (ref 0.6–1)
GAMMA GLOB SERPL ELPH-MCNC: 1.3 G/DL (ref 0.7–1.6)
KAPPA LC UR-MCNC: 15.26 MG/DL (ref 0.33–1.94)
KAPPA LC/LAMBDA SER: 1.27 {RATIO} (ref 0.26–1.65)
LAMBDA LC SERPL-MCNC: 11.99 MG/DL (ref 0.57–2.63)
M PROTEIN SERPL ELPH-MCNC: 0.4 G/DL
PROT PATTERN SERPL ELPH-IMP: ABNORMAL
RHEUMATOID FACT SER NEPH-ACNC: <20 IU/ML (ref 0–20)

## 2019-10-02 ENCOUNTER — COMMITTEE REVIEW (OUTPATIENT)
Dept: TRANSPLANT | Facility: CLINIC | Age: 64
End: 2019-10-02

## 2019-10-02 ENCOUNTER — TELEPHONE (OUTPATIENT)
Dept: TRANSPLANT | Facility: CLINIC | Age: 64
End: 2019-10-02

## 2019-10-02 NOTE — COMMITTEE REVIEW
"Abdominal Committee Review Note     Evaluation Date: 5/2/2018  Committee Review Date: 10/2/2019    Organ being evaluated for: Kidney    Transplant Phase: Waitlist  Transplant Status: Inactive    Transplant Coordinator: Britni Hatch  Transplant Surgeon:       Referring Physician:     Primary Diagnosis: Diabetes Mellitus - Type II  Secondary Diagnosis:     Committee Review Members:  Nephrology Bobby Escobar MD, Des Frazier, APRN CNP, Gilberto Ulloa MD   Nutrition Scarlet Bowden,    Pharmacy Antony Shearer, [facility]    - Clinical Sil Perez, [facility]   Transplant Lynne Sahni PA-C, Kathryn Duke, RN, Britni Hatch, RN, Maliha Jesus, LEWIS, Altagracia Simpson, TONY, Jodee St, RN, Moraima Blackwell, TONY, Guerrero Lakhani MD   Transplant Surgery Guerrero Lakhani MD       Transplant Eligibility:     Committee Review Decision: Pt approved for active status, pending surgeon review of abd/pelvis CT from 11/2018    Relative Contraindications:     Absolute Contraindications:     Committee Chair Guerrero Lakhani MD verbally attested to the committee's decision.    Committee Discussion Details: MAYTE Bernardo presented pt for active status consideration on the kidney transplant waitlist. Pt is approved for active status pending transplant surgeon review of abd/pelvis CT dated 11/16/18. Dr. Lakhani states he will review CT. If surgeon review of CT is sufficient, pt can be listed for active status. Pt is due to see dermatology, but this should not hold up his listing (scheduled on 10/25/19)    Dr. Lakhani able to review CT: \"There is a place to sew artery, but both right and left side have some pretty dense proximal Ca.  Doppler flows show local areas of velocity change, but interpreted as normal.  Should be ok...prefer R side rather than L.\"    "

## 2019-10-02 NOTE — LETTER
October 2, 2019    Murray Nicholson  [address]  [place] [address]      Dear Mr. Nicholson,    This letter is sent to notify you that your listing status was changed from Inactive to Active on the kidney transplant waitlist at the [facility].  Your status was changed because you have completed your workup.    During this waiting period, we may still request periodic laboratory tests with your primary physician.  It will be your responsibility to remind your physician to forward your results to the Transplant Office.    We still need to be kept informed of any changes such as:    o changes in your insurance coverage  o change in your phone number, address or emergency contact  o significant changes in your health  o significant infections (such as pneumonia or abscesses)  o blood transfusions  o any condition which requires hospitalization or surgery    Sincerely,        Kidney Transplant Program    Enclosures: UNOS Letter    CC:  Ana Luisa Mcpherson MD;  [facility]

## 2019-10-04 ENCOUNTER — TELEPHONE (OUTPATIENT)
Dept: TRANSPLANT | Facility: CLINIC | Age: 64
End: 2019-10-04

## 2019-10-04 NOTE — TELEPHONE ENCOUNTER
Pt had not read My Chart message about changing clonidine to once weekly. He took this AM so will take again next Friday.     Cont to check BPs, call if BP increasing, touch base in two week.    Pt verbalized understanding of net steps.

## 2019-10-07 ENCOUNTER — OFFICE VISIT (OUTPATIENT)
Dept: ORTHOPEDICS | Facility: CLINIC | Age: 64
End: 2019-10-07
Attending: FAMILY MEDICINE
Payer: MEDICARE

## 2019-10-07 ENCOUNTER — TELEPHONE (OUTPATIENT)
Dept: RHEUMATOLOGY | Facility: CLINIC | Age: 64
End: 2019-10-07

## 2019-10-07 DIAGNOSIS — M65.311 TRIGGER THUMB OF BOTH HANDS: ICD-10-CM

## 2019-10-07 DIAGNOSIS — M25.642 STIFFNESS OF JOINTS OF BOTH HANDS: Primary | ICD-10-CM

## 2019-10-07 DIAGNOSIS — M25.641 STIFFNESS OF JOINTS OF BOTH HANDS: Primary | ICD-10-CM

## 2019-10-07 DIAGNOSIS — M65.312 TRIGGER THUMB OF BOTH HANDS: ICD-10-CM

## 2019-10-07 RX ORDER — LIDOCAINE HYDROCHLORIDE 10 MG/ML
1 INJECTION, SOLUTION EPIDURAL; INFILTRATION; INTRACAUDAL; PERINEURAL
Status: DISCONTINUED | OUTPATIENT
Start: 2019-10-07 | End: 2019-12-20 | Stop reason: CLARIF

## 2019-10-07 RX ORDER — BETAMETHASONE SODIUM PHOSPHATE AND BETAMETHASONE ACETATE 3; 3 MG/ML; MG/ML
6 INJECTION, SUSPENSION INTRA-ARTICULAR; INTRALESIONAL; INTRAMUSCULAR; SOFT TISSUE
Status: DISCONTINUED | OUTPATIENT
Start: 2019-10-07 | End: 2019-10-28

## 2019-10-07 RX ADMIN — LIDOCAINE HYDROCHLORIDE 1 ML: 10 INJECTION, SOLUTION EPIDURAL; INFILTRATION; INTRACAUDAL; PERINEURAL at 12:30

## 2019-10-07 RX ADMIN — BETAMETHASONE SODIUM PHOSPHATE AND BETAMETHASONE ACETATE 6 MG: 3; 3 INJECTION, SUSPENSION INTRA-ARTICULAR; INTRALESIONAL; INTRAMUSCULAR; SOFT TISSUE at 12:30

## 2019-10-07 ASSESSMENT — ENCOUNTER SYMPTOMS
SINUS CONGESTION: 0
TREMORS: 0
MYALGIAS: 0
ARTHRALGIAS: 1
MUSCLE WEAKNESS: 1
TROUBLE SWALLOWING: 0
LOSS OF CONSCIOUSNESS: 0
NUMBNESS: 0
HEADACHES: 0
TASTE DISTURBANCE: 0
JOINT SWELLING: 0
TINGLING: 1
NECK PAIN: 0
SMELL DISTURBANCE: 0
DIZZINESS: 0
HOARSE VOICE: 1
MUSCLE CRAMPS: 0
NECK MASS: 0
DISTURBANCES IN COORDINATION: 0
PARALYSIS: 0
MEMORY LOSS: 0
STIFFNESS: 1
BACK PAIN: 0
SEIZURES: 0
SPEECH CHANGE: 0
SORE THROAT: 0
WEAKNESS: 1
SINUS PAIN: 0

## 2019-10-07 NOTE — NURSING NOTE
Reason For Visit:   Chief Complaint   Patient presents with     Consult For     Trigger thumb of left thumb - patient is unable to close both of his hands       Primary MD: Yahir Turcios    ?  No    Age: 64 year old    Patient is on anti-rejection medications from his transplant and feels that this might be related.  Date of surgery: NA  Type of surgery: NA.  Smoker: No  Request smoking cessation information: No      There were no vitals taken for this visit.      Pain Assessment  Patient Currently in Pain: Denies(Mostly tingling)               QuickDASH Assessment  No flowsheet data found.       Allergies   Allergen Reactions     Norco [Hydrocodone-Acetaminophen] Nausea and Vomiting     Cats      Throat tightness     Isosorbide Other (See Comments)     hypotension     Penicillins Hives     Seasonal Allergies      rhinitis     Shrimp      Throat closes        Colette Wheat, ATC

## 2019-10-07 NOTE — PROGRESS NOTES
[facility]  Orthopedics  Vipin Lacey MD  10/07/2019     Name: Murray Nicholson  MRN: [id number]  Age: 64 year old  : 1955  Referring provider: Abram Moulton     Chief Complaint: Consult For (Trigger thumb of left thumb - patient is unable to close both of his hands)    History of Present Illness:   Murray Nicholson is a 64 year old male with history of gout and heart transplant who presents today for evaluation of left thumb limited ROM and bilateral hand stiffness. Murray reports that a few months ago, he began having clicking in the bilateral thumbs.  It resolved on the right but the left got worse to the point where it no longer clicks but he really cannot flex it at all.  He also noted that his fingers are all stiff.  This is been occurring progressively.   There is no significant associated pain.  He has been unable to open jars due to weakness in his bilateral hands and stiffness of digits. He also reports swelling of bilateral hands.  He reports a history of gout.  He did not have this for a long time after losing weight but it flared up again recently and his foot.  He is wondering if the hand stiffness may relate to this.  He also notes a long-standing history of numbness and tingling in the fingers.  Reports this is present all the time but hardly noticeable.  It is not getting worse.  Has been going on for quite some time.    Review of Systems:   A 10-point review of systems was obtained and is negative except for as noted in the HPI.     Medications:     Current Outpatient Medications:      acetaminophen (TYLENOL) 500 MG tablet, Take 2 tablets (1,000 mg) by mouth 4 times daily (Patient taking differently: Take 1,000 mg by mouth every 6 hours as needed ), Disp: 60 tablet, Rfl: 1     amLODIPine (NORVASC) 10 MG tablet, Take 1 tablet (10 mg) by mouth daily, Disp: 90 tablet, Rfl: 3     aspirin 81 MG EC tablet, Take 81 mg by mouth daily, Disp: , Rfl:      atorvastatin (LIPITOR) 40 MG tablet, Take 1  tablet (40 mg) by mouth daily, Disp: 90 tablet, Rfl: 3     biotin (BIOTIN 5000) 5 MG CAPS, Take 5 mg by mouth daily, Disp: 30 capsule, Rfl: 3     blood glucose monitoring (ACCU-CHEK FASTCLIX) lancets, USE TO TEST 2-3 TIMES DAILY OR AS DIRECTED., Disp: 102 each, Rfl: 0     cloNIDine (CATAPRES) 0.1 MG tablet, Once a week; non-dialysis, Disp: 12 tablet, Rfl: 3     fluticasone (FLONASE) 50 MCG/ACT nasal spray, Spray 1 spray into both nostrils daily (Patient taking differently: Spray 1 spray into both nostrils daily as needed ), Disp: 11.1 mL, Rfl: 11     hydrALAZINE (APRESOLINE) 100 MG TABS tablet, Take 1 tablet (100 mg) by mouth every 8 hours, Disp: 90 tablet, Rfl: 11     lidocaine-prilocaine (EMLA) 2.5-2.5 % external cream, apply 1 hour prior to dialysis, Disp: , Rfl: 0     lisinopril (PRINIVIL/ZESTRIL) 10 MG tablet, Take 1 tablet (10 mg) by mouth 2 times daily, Disp: 180 tablet, Rfl: 3     loratadine (CLARITIN) 10 MG tablet, Take 10 mg by mouth daily as needed Reported on 5/3/2017, Disp: , Rfl:      melatonin 1 MG TABS tablet, Take 2 tablets (2 mg) by mouth nightly as needed for sleep, Disp: 120 tablet, Rfl: 1     mupirocin (BACTROBAN) 2 % external ointment, Apply topically 3 times daily, Disp: 15 g, Rfl: 0     mycophenolate (GENERIC EQUIVALENT) 250 MG capsule, Take 3 capsules (750 mg) by mouth 2 times daily, Disp: 180 capsule, Rfl: 11     NEPHROCAPS 1 MG capsule, Take 1 capsule by mouth daily, Disp: 120 capsule, Rfl: 0     pantoprazole (PROTONIX) 40 MG EC tablet, Take 1 tablet (40 mg) by mouth 2 times daily (before meals), Disp: 60 tablet, Rfl: 11     tacrolimus (GENERIC EQUIVALENT) 1 MG capsule, Take 3 capsules (3 mg) by mouth 2 times daily, Disp: 180 capsule, Rfl: 11     traMADol (ULTRAM) 50 MG tablet, Take 50 mg by mouth every 6 hours as needed for severe pain, Disp: , Rfl:      valACYclovir (VALTREX) 500 MG tablet, Take 1 tablet (500 mg) by mouth daily for 5 days, Disp: 5 tablet, Rfl: 0  No current  facility-administered medications for this visit.     Facility-Administered Medications Ordered in Other Visits:      diatrizoate meglumine-sodium (GASTROGRAFIN/GASTROVIEW) 66-10 % solution 480 mL, 480 mL, Rectal, Once, Christopher Baker MD    Allergies:  Norco [hydrocodone-acetaminophen]; Cats; Isosorbide; Penicillins; Seasonal allergies; and Shrimp     Past Medical History:  HFpEF  Gout  Allergic rhinitis  Anemia of chronic kidney failure  Aortic stenosis  Ascending aortic aneurysm  Bicuspid aortic valve  CAD  Congestive heart failure  Dialysis patient  Dyslipidemia  Esophageal reflux  ESRD  Hearing problem  Heart replaced by transplant  Hypersomnia  Hypertension  Ileostomy  Immunosuppression  MGUS  SHEELA  Mitral regurgitation  Pneumonia  Systolic heart failure  Type 2 diabetes    Past Surgical History:  Colonoscopy  Extensive cardiac surgery, including heart transplant    Social History:  Presents to clinic alone  Tobacco Use: quit smoking 25 years ago  Alcohol Use: none  PCP: Yahir Turcios    Family History:  Father - CAD, diabetes, cerebrovascular disease, hypertension    Physical Examination:  General: Healthy appearing male. Affect appropriate. Normal gait. Alert and oriented to surroundings.   Right Upper Extremity:   Good  strength on right, 4+ on left  Diffuse bilateral digit stiffness  Can make fist with 3 cm tip to palm distance bilaterally  Mild positive tinel's at carpal tunnel on left, negative on right  Positive carpal tunnel compression on right, not on left  No sign of flexor tenosynovitis  Wrists are supple  Positive Phalen on right, negative on left  No swelling or deformity  No muscle atrophy  Good thumb strength of opposition  Good interosseous muscle strength  Tender at A1 pulley on the left with stephanie locking  Right thumb is supple with no locking or triggering appreciated  palpable nodule at left A1 pulley  Sensation intact median, radial, ulnar      2/26/19 hand xr bilateral:                                                                     Impression:  1. No acute osseous abnormality.  2. Mild scattered degenerative changes.  3. Osteopenic appearance of the bones.      Assessment:   64 year old male with past medical history of gout presenting with bilateral diffuse hand stiffness and resolved right, persistent left trigger thumb.  He also has some poorly localized numbness and tingling in hands which she reports is been going on for quite some time and is not progressive.  We discussed that this could possibly represent carpal tunnel versus another etiology.  This is not significantly bothersome to him right now and is been getting worse so he would like to continue to monitor it.  Will notify me if he notices any change or worsening the numbness and tingling and we will pursue an EMG    Plan:   For his hand stiffness, I discussed I do not see any arthritic changes on his xrays that would account for this.  I think this is more likely metabolic or inflammatory than mechanical.  It is not localized. He has been planning to follow-up with rheumatology regarding his gout which may be a contributor to this.  He will discuss with them whether this may be contributing to his hand stiffness.  I will also refer him to hand therapy to work on range of motion.  For his trigger thumb, we discussed treatment options. We discussed three possible treatment options. The first would be to continue to observe the symptoms for any change or progression. The second would be to perform a corticosteroid injection. The third is to consider surgery, which would be a trigger release. I recommended a trigger corticosteroid injection before proceeding with surgery, as many people have permanently resolution of symptoms with 1 or 2 injections. Please see the procedure note below. He will let me know if symptoms persist/recur and we will plan for trigger release vs repeat injectino  at that time if he so desires.          Procedure:   After written informed consent was obtained, the patient's left thumb was prepped with chloraprep.  2mL of a 1:1 mixture of 30mg/5ml of Betamethasone and  of 1% lidocaine was injected into the left thumb A1 pulley area. There were no immediate complications.    Vipin Lacey MD      Scribe Disclosure:  Ellyn ALLEN, am serving as a scribe to document services personally performed by Vipin Lacey MD at this visit, based upon the provider's statements to me. All documentation has been reviewed by the aforementioned provider prior to being entered into the official medical record.    Answers for HPI/ROS submitted by the patient on 10/7/2019   General Symptoms: No  Skin Symptoms: No  HENT Symptoms: Yes  EYE SYMPTOMS: No  HEART SYMPTOMS: No  LUNG SYMPTOMS: No  INTESTINAL SYMPTOMS: No  URINARY SYMPTOMS: No  REPRODUCTIVE SYMPTOMS: No  SKELETAL SYMPTOMS: Yes  BLOOD SYMPTOMS: No  NERVOUS SYSTEM SYMPTOMS: Yes  MENTAL HEALTH SYMPTOMS: No  Ear pain: No  Ear discharge: No  Hearing loss: No  Tinnitus: No  Nosebleeds: No  Congestion: No  Sinus pain: No  Trouble swallowing: No   Voice hoarseness: Yes  Mouth sores: No  Sore throat: No  Tooth pain: No  Gum tenderness: No  Bleeding gums: No  Change in taste: No  Change in sense of smell: No  Dry mouth: No  Hearing aid used: No  Neck lump: No  Back pain: No  Muscle aches: No  Neck pain: No  Swollen joints: No  Joint pain: Yes  Bone pain: No  Muscle cramps: No  Muscle weakness: Yes  Joint stiffness: Yes  Bone fracture: No  Trouble with coordination: No  Dizziness or trouble with balance: No  Fainting or black-out spells: No  Memory loss: No  Headache: No  Seizures: No  Speech problems: No  Tingling: Yes  Tremor: No  Weakness: Yes  Difficulty walking: No  Paralysis: No  Numbness: No  Hand / Upper Extremity Injection/Arthrocentesis: L thumb A1  Date/Time: 10/7/2019 12:30 PM  Performed by: Vipin Lacey MD  Authorized by: Vipin Lacey MD      Needle Size:  27 G  Guidance: landmark    Condition: trigger finger    Location:  Thumb    Site:  L thumb A1  Medications:  6 mg betamethasone acet & sod phos 6 (3-3) MG/ML; 1 mL lidocaine (PF) 1 %  Procedure discussed: discussed risks, benefits, and alternatives    Consent Given by:  Patient  Timeout: timeout called immediately prior to procedure    Prep: patient was prepped and draped in usual sterile fashion          [facility] [place] [id number] [address] [address] [phone]  Dept: [phone]  ______________________________________________________________________________    Patient: Murray Nicholson   : 1955   MRN: [id number]   2019    INVASIVE PROCEDURE SAFETY CHECKLIST    Date: 10/7/2019   Procedure:Left trigger thumb steroid injection   Patient Name: Murray Nicholson  MRN: [id number]  YOB: 1955    Action: Complete sections as appropriate. Any discrepancy results in a HARD COPY until resolved.     PRE PROCEDURE:  Patient ID verified with 2 identifiers (name and  or MRN): Yes  Procedure and site verified with patient/designee (when able): Yes  Accurate consent documentation in medical record: Yes  H&P (or appropriate assessment) documented in medical record: Yes  H&P must be up to 20 days prior to procedure and updates within 24 hours of procedure as applicable: Yes  Relevant diagnostic and radiology test results appropriately labeled and displayed as applicable: Yes  Procedure site(s) marked with provider initials: Yes    TIMEOUT:  Time-Out performed immediately prior to starting procedure, including verbal and active participation of all team members addressing the following:Yes  * Correct patient identify  * Confirmed that the correct side and site are marked  * An accurate procedure consent form  * Agreement on the procedure to be done  * Correct patient position  * Relevant images and results are properly labeled and appropriately  displayed  * The need to administer antibiotics or fluids for irrigation purposes during the procedure as applicable   * Safety precautions based on patient history or medication use    DURING PROCEDURE: Verification of correct person, site, and procedures any time the responsibility for care of the patient is transferred to another member of the care team.       The following medications were given:         Prior to injection, verified patient identity using patient's name and date of birth.  Due to injection administration, patient instructed to remain in clinic for 15 minutes  afterwards, and to report any adverse reaction to me immediately.    Trigger thumb injection   Medication Name: Lidocaine 1%  Drug Amount Wasted:  Yes: 4 mg/ml   Vial/Syringe: Single dose vial  Expiration Date:  1/1/23      Trigger thumb injection   Medication Name: Celestone 6mg/ml  Drug Amount Wasted:  Yes: 4 mg/ml   Vial/Syringe: Single dose vial  Expiration Date:  8/1/2020    Karla Clemons, ATC

## 2019-10-07 NOTE — LETTER
10/7/2019       RE: Murray Nicholson  [address]  [place] [address]     Dear Colleague,    Thank you for referring your patient, Murray Nicholson, to the [facility] at [facility]. Please see a copy of my visit note below.    [facility]  Orthopedics  Vipin Lacey MD  10/07/2019     Name: Murray Nicholson  MRN: [id number]  Age: 64 year old  : 1955  Referring provider: Abram Moulton     Chief Complaint: Consult For (Trigger thumb of left thumb - patient is unable to close both of his hands)    History of Present Illness:   Murray Nicholson is a 64 year old male with history of gout and heart transplant who presents today for evaluation of left thumb limited ROM and bilateral hand stiffness. Murray reports that a few months ago, he began having clicking in the bilateral thumbs.  It resolved on the right but the left got worse to the point where it no longer clicks but he really cannot flex it at all.  He also noted that his fingers are all stiff.  This is been occurring progressively.   There is no significant associated pain.  He has been unable to open jars due to weakness in his bilateral hands and stiffness of digits. He also reports swelling of bilateral hands.  He reports a history of gout.  He did not have this for a long time after losing weight but it flared up again recently and his foot.  He is wondering if the hand stiffness may relate to this.  He also notes a long-standing history of numbness and tingling in the fingers.  Reports this is present all the time but hardly noticeable.  It is not getting worse.  Has been going on for quite some time.    Review of Systems:   A 10-point review of systems was obtained and is negative except for as noted in the HPI.     Medications:     Current Outpatient Medications:      acetaminophen (TYLENOL) 500 MG tablet, Take 2 tablets (1,000 mg) by mouth 4 times daily (Patient taking differently: Take  1,000 mg by mouth every 6 hours as needed ), Disp: 60 tablet, Rfl: 1     amLODIPine (NORVASC) 10 MG tablet, Take 1 tablet (10 mg) by mouth daily, Disp: 90 tablet, Rfl: 3     aspirin 81 MG EC tablet, Take 81 mg by mouth daily, Disp: , Rfl:      atorvastatin (LIPITOR) 40 MG tablet, Take 1 tablet (40 mg) by mouth daily, Disp: 90 tablet, Rfl: 3     biotin (BIOTIN 5000) 5 MG CAPS, Take 5 mg by mouth daily, Disp: 30 capsule, Rfl: 3     blood glucose monitoring (ACCU-CHEK FASTCLIX) lancets, USE TO TEST 2-3 TIMES DAILY OR AS DIRECTED., Disp: 102 each, Rfl: 0     cloNIDine (CATAPRES) 0.1 MG tablet, Once a week; non-dialysis, Disp: 12 tablet, Rfl: 3     fluticasone (FLONASE) 50 MCG/ACT nasal spray, Spray 1 spray into both nostrils daily (Patient taking differently: Spray 1 spray into both nostrils daily as needed ), Disp: 11.1 mL, Rfl: 11     hydrALAZINE (APRESOLINE) 100 MG TABS tablet, Take 1 tablet (100 mg) by mouth every 8 hours, Disp: 90 tablet, Rfl: 11     lidocaine-prilocaine (EMLA) 2.5-2.5 % external cream, apply 1 hour prior to dialysis, Disp: , Rfl: 0     lisinopril (PRINIVIL/ZESTRIL) 10 MG tablet, Take 1 tablet (10 mg) by mouth 2 times daily, Disp: 180 tablet, Rfl: 3     loratadine (CLARITIN) 10 MG tablet, Take 10 mg by mouth daily as needed Reported on 5/3/2017, Disp: , Rfl:      melatonin 1 MG TABS tablet, Take 2 tablets (2 mg) by mouth nightly as needed for sleep, Disp: 120 tablet, Rfl: 1     mupirocin (BACTROBAN) 2 % external ointment, Apply topically 3 times daily, Disp: 15 g, Rfl: 0     mycophenolate (GENERIC EQUIVALENT) 250 MG capsule, Take 3 capsules (750 mg) by mouth 2 times daily, Disp: 180 capsule, Rfl: 11     NEPHROCAPS 1 MG capsule, Take 1 capsule by mouth daily, Disp: 120 capsule, Rfl: 0     pantoprazole (PROTONIX) 40 MG EC tablet, Take 1 tablet (40 mg) by mouth 2 times daily (before meals), Disp: 60 tablet, Rfl: 11     tacrolimus (GENERIC EQUIVALENT) 1 MG capsule, Take 3 capsules (3 mg) by mouth 2  times daily, Disp: 180 capsule, Rfl: 11     traMADol (ULTRAM) 50 MG tablet, Take 50 mg by mouth every 6 hours as needed for severe pain, Disp: , Rfl:      valACYclovir (VALTREX) 500 MG tablet, Take 1 tablet (500 mg) by mouth daily for 5 days, Disp: 5 tablet, Rfl: 0  No current facility-administered medications for this visit.     Facility-Administered Medications Ordered in Other Visits:      diatrizoate meglumine-sodium (GASTROGRAFIN/GASTROVIEW) 66-10 % solution 480 mL, 480 mL, Rectal, Once, Christopher Baker MD    Allergies:  Norco [hydrocodone-acetaminophen]; Cats; Isosorbide; Penicillins; Seasonal allergies; and Shrimp     Past Medical History:  HFpEF  Gout  Allergic rhinitis  Anemia of chronic kidney failure  Aortic stenosis  Ascending aortic aneurysm  Bicuspid aortic valve  CAD  Congestive heart failure  Dialysis patient  Dyslipidemia  Esophageal reflux  ESRD  Hearing problem  Heart replaced by transplant  Hypersomnia  Hypertension  Ileostomy  Immunosuppression  MGUS  SHEELA  Mitral regurgitation  Pneumonia  Systolic heart failure  Type 2 diabetes    Past Surgical History:  Colonoscopy  Extensive cardiac surgery, including heart transplant    Social History:  Presents to clinic alone  Tobacco Use: quit smoking 25 years ago  Alcohol Use: none  PCP: Yahir Turcios    Family History:  Father - CAD, diabetes, cerebrovascular disease, hypertension    Physical Examination:  General: Healthy appearing male. Affect appropriate. Normal gait. Alert and oriented to surroundings.   Right Upper Extremity:   Good  strength on right, 4+ on left  Diffuse bilateral digit stiffness  Can make fist with 3 cm tip to palm distance bilaterally  Mild positive tinel's at carpal tunnel on left, negative on right  Positive carpal tunnel compression on right, not on left  No sign of flexor tenosynovitis  Wrists are supple  Positive Phalen on right, negative on left  No swelling or deformity  No muscle atrophy  Good thumb strength  of opposition  Good interosseous muscle strength  Tender at A1 pulley on the left with stephanie locking  Right thumb is supple with no locking or triggering appreciated  palpable nodule at left A1 pulley  Sensation intact median, radial, ulnar      2/26/19 hand xr bilateral:                                                                    Impression:  1. No acute osseous abnormality.  2. Mild scattered degenerative changes.  3. Osteopenic appearance of the bones.      Assessment:   64 year old male with past medical history of gout presenting with bilateral diffuse hand stiffness and resolved right, persistent left trigger thumb.  He also has some poorly localized numbness and tingling in hands which she reports is been going on for quite some time and is not progressive.  We discussed that this could possibly represent carpal tunnel versus another etiology.  This is not significantly bothersome to him right now and is been getting worse so he would like to continue to monitor it.  Will notify me if he notices any change or worsening the numbness and tingling and we will pursue an EMG    Plan:   For his hand stiffness, I discussed I do not see any arthritic changes on his xrays that would account for this.  I think this is more likely metabolic or inflammatory than mechanical.  It is not localized. He has been planning to follow-up with rheumatology regarding his gout which may be a contributor to this.  He will discuss with them whether this may be contributing to his hand stiffness.  I will also refer him to hand therapy to work on range of motion.  For his trigger thumb, we discussed treatment options. We discussed three possible treatment options. The first would be to continue to observe the symptoms for any change or progression. The second would be to perform a corticosteroid injection. The third is to consider surgery, which would be a trigger release. I recommended a trigger corticosteroid injection before  proceeding with surgery, as many people have permanently resolution of symptoms with 1 or 2 injections. Please see the procedure note below. He will let me know if symptoms persist/recur and we will plan for trigger release vs repeat injectino  at that time if he so desires.       Procedure:   After written informed consent was obtained, the patient's left thumb was prepped with chloraprep.  2mL of a 1:1 mixture of 30mg/5ml of Betamethasone and  of 1% lidocaine was injected into the left thumb A1 pulley area. There were no immediate complications.    Vipin Lacey MD    Scribe Disclosure:  Ellyn ALLEN, am serving as a scribe to document services personally performed by Vipin Lacey MD at this visit, based upon the provider's statements to me. All documentation has been reviewed by the aforementioned provider prior to being entered into the official medical record.      Hand / Upper Extremity Injection/Arthrocentesis: L thumb A1  Date/Time: 10/7/2019 12:30 PM  Performed by: Vipin Lacey MD  Authorized by: Vipin Lacey MD     Needle Size:  27 G  Guidance: landmark    Condition: trigger finger    Location:  Thumb    Site:  L thumb A1  Medications:  6 mg betamethasone acet & sod phos 6 (3-3) MG/ML; 1 mL lidocaine (PF) 1 %  Procedure discussed: discussed risks, benefits, and alternatives    Consent Given by:  Patient  Timeout: timeout called immediately prior to procedure    Prep: patient was prepped and draped in usual sterile fashion      [facility]  [address] [place] [address] [phone]  Dept: [phone]  ______________________________________________________________________________    Patient: Murray Nicholson   : 1955   MRN: [id number]   2019    INVASIVE PROCEDURE SAFETY CHECKLIST    Date: 10/7/2019   Procedure:Left trigger thumb steroid injection   Patient Name: Murray Nicholson  MRN: [id number]  Date of birth:  1955    Action: Complete sections as appropriate. Any discrepancy results in a HARD COPY until resolved.     PRE PROCEDURE:  Patient ID verified with 2 identifiers (name and  or MRN): Yes  Procedure and site verified with patient/designee (when able): Yes  Accurate consent documentation in medical record: Yes  H&P (or appropriate assessment) documented in medical record: Yes  H&P must be up to 20 days prior to procedure and updates within 24 hours of procedure as applicable: Yes  Relevant diagnostic and radiology test results appropriately labeled and displayed as applicable: Yes  Procedure site(s) marked with provider initials: Yes    TIMEOUT:  Time-Out performed immediately prior to starting procedure, including verbal and active participation of all team members addressing the following:Yes  * Correct patient identify  * Confirmed that the correct side and site are marked  * An accurate procedure consent form  * Agreement on the procedure to be done  * Correct patient position  * Relevant images and results are properly labeled and appropriately displayed  * The need to administer antibiotics or fluids for irrigation purposes during the procedure as applicable   * Safety precautions based on patient history or medication use    DURING PROCEDURE: Verification of correct person, site, and procedures any time the responsibility for care of the patient is transferred to another member of the care team.       The following medications were given:       Prior to injection, verified patient identity using patient's name and date of birth.  Due to injection administration, patient instructed to remain in clinic for 15 minutes  afterwards, and to report any adverse reaction to me immediately.    Trigger thumb injection   Medication Name: Lidocaine 1%  Drug Amount Wasted:  Yes: 4 mg/ml   Vial/Syringe: Single dose vial  Expiration Date:  23    Trigger thumb injection   Medication Name: Celestone 6mg/ml  Drug  Amount Wasted:  Yes: 4 mg/ml   Vial/Syringe: Single dose vial  Expiration Date:  8/1/2020    Karla Clemons, ATC

## 2019-10-07 NOTE — TELEPHONE ENCOUNTER
[facility] Call Center    Phone Message    May a detailed message be left on voicemail: yes    Reason for Call: Other: Patient with an internal referral from  Kenney Johnson and a diagnosis of Synovitis of hand//Heart transplanted //H/O: gout would like to start the rheum intake process//Please contact patient at [phone] to start intake process//Thanks.  Action Taken: Message routed to:  [facility] ([facility]): rheum

## 2019-10-15 DIAGNOSIS — Z94.1 HEART REPLACED BY TRANSPLANT (H): ICD-10-CM

## 2019-10-17 ENCOUNTER — TELEPHONE (OUTPATIENT)
Dept: DERMATOLOGY | Facility: CLINIC | Age: 64
End: 2019-10-17

## 2019-10-17 RX ORDER — ATORVASTATIN CALCIUM 40 MG/1
40 TABLET, FILM COATED ORAL DAILY
Qty: 90 TABLET | Refills: 3 | Status: SHIPPED | OUTPATIENT
Start: 2019-10-17 | End: 2020-10-14

## 2019-10-18 ENCOUNTER — TELEPHONE (OUTPATIENT)
Dept: DERMATOLOGY | Facility: CLINIC | Age: 64
End: 2019-10-18

## 2019-10-18 ENCOUNTER — RESULTS ONLY (OUTPATIENT)
Dept: OTHER | Facility: CLINIC | Age: 64
End: 2019-10-18

## 2019-10-18 DIAGNOSIS — N18.6 ESRD (END STAGE RENAL DISEASE) (H): ICD-10-CM

## 2019-10-18 DIAGNOSIS — N18.6 ESRD (END STAGE RENAL DISEASE) (H): Primary | ICD-10-CM

## 2019-10-21 ENCOUNTER — OFFICE VISIT (OUTPATIENT)
Dept: DERMATOLOGY | Facility: CLINIC | Age: 64
End: 2019-10-21
Payer: MEDICARE

## 2019-10-21 VITALS — HEART RATE: 97 BPM | SYSTOLIC BLOOD PRESSURE: 129 MMHG | DIASTOLIC BLOOD PRESSURE: 68 MMHG

## 2019-10-21 DIAGNOSIS — D22.9 MULTIPLE MELANOCYTIC NEVI: ICD-10-CM

## 2019-10-21 DIAGNOSIS — Z94.1 HISTORY OF HEART TRANSPLANT (H): ICD-10-CM

## 2019-10-21 DIAGNOSIS — D23.9 DERMATOFIBROMA: ICD-10-CM

## 2019-10-21 DIAGNOSIS — B00.89 HERPES SIMPLEX VIRUS (HSV) INFECTION OF BUTTOCK: Primary | ICD-10-CM

## 2019-10-21 RX ORDER — VALACYCLOVIR HYDROCHLORIDE 1 G/1
1000 TABLET, FILM COATED ORAL 2 TIMES DAILY
Qty: 20 TABLET | Refills: 0 | Status: SHIPPED | OUTPATIENT
Start: 2019-10-21 | End: 2019-12-10

## 2019-10-21 ASSESSMENT — PAIN SCALES - GENERAL: PAINLEVEL: NO PAIN (0)

## 2019-10-21 NOTE — NURSING NOTE
Dermatology Rooming Note    Murray Nicholson's goals for this visit include:   Chief Complaint   Patient presents with     Skin Check     Murray is here today for a skin check. He has one area of concern on his buttock     Valentin Perez CMA

## 2019-10-21 NOTE — PROGRESS NOTES
[facility] Dermatology Note      Dermatology Problem List:  1. Heart Transplant 6/14/2018   -mycophenolate, tacrolimus  2. Hyperkeratosis/Xerosis of the distal fingers - TAC 0.1% ointment  3. Onychomycosis   -dilute bleach soaks  4. HSV type 2: PCR-proven   -valacyclovir 1000 mg BID x 10 days (initiated 10/12/19, OTC moisturizers)   -s/p valacyclovir 500 mg daily x 5 days without improvement, mupirocin  5. Epidermoid cyst- upper back  6. Dermatofibromata    Encounter Date: Oct 21, 2019    CC:  Chief Complaint   Patient presents with     Skin Check     Murray is here today for a skin check. He has one area of concern on his buttock         History of Present Illness:  Mr. Murray Nicholson is a 64 year old male who presents for a skin check. The patient reports that he has a red  and irritating rash on his buttocks that developed earlier this fall. Of note, his PCP swabbed the area and PCR was positive for HSV Type 2. He was placed on a course of valacyclovir 500 mg daily for 5 days which he has since completed. He also started mupirocin 2% ointment which he has been using regularly since 9/27/19. He believes that the rash has since persisted, and he would like it evaluated. It is tender but not overtly painful. Other than this, he would like his skin evaluated for any concerning features. He notes that his finger and toe nails have been fissuring and thickening in the past, but this has been palliated with biotin. He states that a podiatrist took a clipping of one of the nails in the past, and he was told that there was no fungus present. The patient voices no other concerns.    Past Medical History:   Patient Active Problem List   Diagnosis     Esophageal reflux     Allergic rhinitis     CKD (chronic kidney disease) stage 5, GFR less than 15 ml/min (H)     Hypertension goal BP (blood pressure) < 130/80     Anemia of chronic disease     Dyslipidemia     MGUS (monoclonal gammopathy of unknown  significance)     Ascending aortic aneurysm (H)     Anemia, iron deficiency     Anemia in stage 5 chronic kidney disease (H)     Heart transplanted (H)     Leukopenia     Heart transplant recipient (H)     Sigmoid diverticulitis     Heart replaced by transplant (H)     Aortic stenosis     Status post coronary angiogram     ESRD (end stage renal disease) (H)     Immunosuppression (H)     Type 2 diabetes mellitus (H)     Past Medical History:   Diagnosis Date     (HFpEF) heart failure with preserved ejection fraction (H)      Allergic rhinitis, cause unspecified      Anemia of chronic kidney failure      AS (aortic stenosis)      Ascending aortic aneurysm (H)      Bicuspid aortic valve      CAD (coronary artery disease)      Congestive heart failure, unspecified      Dialysis patient (H)     Tues-Thur-Sat     Dyslipidemia      Esophageal reflux      ESRD (end stage renal disease) (H)      Hearing problem      Heart replaced by transplant (H) 12/10/2018     Hypersomnia with sleep apnea, unspecified      Hypertension      Ileostomy status (H)      Immunosuppression (H)      MGUS (monoclonal gammopathy of unknown significance)      Mitral regurgitation      SHEELA (obstructive sleep apnea)     No CPAP     Pneumonia      Systolic heart failure (H)      Type 2 diabetes mellitus (H)      Past Surgical History:   Procedure Laterality Date     CARDIAC SURGERY       COLONOSCOPY N/A 5/3/2018    Procedure: COLONOSCOPY;  colonoscopy ;  Surgeon: Ammon Castillo MD;  Location:  GI     CREATE FISTULA ARTERIOVENOUS UPPER EXTREMITY BOVINE Left 5/8/2019    Procedure: Left Upper Extremity Arteriovenous Bovine Graft Creation;  Surgeon: Calin Cheney MD;  Location: [facility] OR     CV CORONARY ANGIOGRAM N/A 6/28/2019    Procedure: CV CORONARY ANGIOGRAM;  Surgeon: Montrell Posada MD;  Location:  HEART CARDIAC CATH LAB     CV HEART BIOPSY N/A 2/1/2019    Procedure: HBX;  Surgeon: Montrell Posada MD;  Location: [facility] CARDIAC  CATH LAB     CV HEART BIOPSY N/A 3/22/2019    Procedure: HBX, RIJV ACCESS;  Surgeon: Jordan Fox MD;  Location:  HEART CARDIAC CATH LAB     CV HEART BIOPSY N/A 6/28/2019    Procedure: CV HEART BIOPSY;  Surgeon: Montrell Posada MD;  Location:  HEART CARDIAC CATH LAB     CV RIGHT HEART CATH N/A 6/28/2019    Procedure: CV RIGHT HEART CATH;  Surgeon: Montrell Posada MD;  Location:  HEART CARDIAC CATH LAB     ESOPHAGOSCOPY, GASTROSCOPY, DUODENOSCOPY (EGD), COMBINED N/A 5/7/2018    Procedure: COMBINED ENDOSCOPIC ULTRASOUND, ESOPHAGOSCOPY, GASTROSCOPY, DUODENOSCOPY (EGD), FINE NEEDLE ASPIRATE/BIOPSY;  Endoscopic Ultrasound with Fine Needle Aspiration ;  Surgeon: Alon Don MD;  Location: [facility] OR     EXAM UNDER ANESTHESIA RECTUM N/A 8/12/2018    Procedure: EXAM UNDER ANESTHESIA RECTUM;  EXAM UNDER ANESTHESIA RECTUM ,COMBINED INCISION AND DRAINAGE OF RECTAL ABCESS ;  Surgeon: Rick Tran MD;  Location: [facility] OR     INCISION AND DRAINAGE RECTUM, COMBINED N/A 8/12/2018    Procedure: COMBINED INCISION AND DRAINAGE RECTUM;;  Surgeon: Rick Tran MD;  Location: [facility]     IR DIALYSIS FISTULOGRAM LEFT  9/25/2019     IR DIALYSIS PTA  9/25/2019     LAPAROSCOPIC ASSISTED COLOSTOMY TAKEDOWN N/A 12/11/2018    Procedure: Laparoscopic Assisted Colostomy Takedown, Laparoscopic Lysis of Adhesions;  Surgeon: Rick Tran MD;  Location: [facility]     LAPAROSCOPIC ASSISTED SIGMOID COLECTOMY N/A 8/14/2018    Procedure: LAPAROSCOPIC ASSISTED SIGMOID COLECTOMY;  Laparoscopic Hand Assisted Takedown of Splenic Flexure, Sigmoidectomy, Small Bowel Resection, Takedown of Small Bowel to Colon Fistula;  Surgeon: Rick Tran MD;  Location: [facility]     LAPAROSCOPIC HERNIORRHAPHY INGUINAL BILATERAL Bilateral 7/24/2015    Procedure: LAPAROSCOPIC HERNIORRHAPHY INGUINAL BILATERAL;  Surgeon: Bobby Mcconnell MD;  Location: [facility]     LAPAROSCOPIC INSERTION CATHETER PERITONEAL  DIALYSIS N/A 2017    Procedure: LAPAROSCOPIC INSERTION CATHETER PERITONEAL DIALYSIS;  Laparoscopic Peritoneal Dialysis Catheter Placement - Anesthesia with block;  Surgeon: Esteban Arvizu MD;  Location: [facility] OR     PICC INSERTION Left 2018    5Fr - 49cm (3cm external), Basilic vein, low SVC     REMOVE CATHETER PERITONEAL Right 1/15/2018    Procedure: REMOVE CATHETER PERITONEAL;  Open Removal of Peritoneal Dialysis Catheter ;  Surgeon: Esteban Arvizu MD;  Location: [facility]     SIGMOIDOSCOPY FLEXIBLE N/A 2018    Procedure: Examination Under Anesthesia, Flexible Sigmoidoscopy and Polypectomy;  Surgeon: Rick Tran MD;  [facility]: [facility]     SIGMOIDOSCOPY FLEXIBLE N/A 2018    Procedure: Flexible Sigmoidoscopy;  Surgeon: Rick Tran MD;  Location: [facility]     TAKEDOWN ILEOSTOMY N/A 3/27/2019    Procedure: Takedown Ileostomy;  Surgeon: Rick Tran MD;  Location: [facility]     TRANSPLANT HEART RECIPIENT N/A 2018    Procedure: TRANSPLANT HEART RECIPIENT;  Median Sternotomy, on-pump oxygenator, Heart Transplant;  Surgeon: Rony Caputo MD;  Location: [facility] OR       Social History:  Patient reports that he quit smoking about 25 years ago. His smoking use included cigarettes. He started smoking about 35 years ago. He has a 20.00 pack-year smoking history. He has never used smokeless tobacco. He reports that he does not drink alcohol or use drugs.    Family History:  Family History   Problem Relation Age of Onset     C.A.D. Father          from-never knew father-age 60     Diabetes Father      Cerebrovascular Disease Father      Hypertension Father      Hypertension No family hx of      Breast Cancer No family hx of      Cancer - colorectal No family hx of      Prostate Cancer No family hx of      Kidney Disease No family hx of      Melanoma No family hx of      Skin Cancer No family hx of        Medications:  Current Outpatient Medications   Medication Sig  Dispense Refill     acetaminophen (TYLENOL) 500 MG tablet Take 2 tablets (1,000 mg) by mouth 4 times daily (Patient taking differently: Take 1,000 mg by mouth every 6 hours as needed ) 60 tablet 1     amLODIPine (NORVASC) 10 MG tablet Take 1 tablet (10 mg) by mouth daily 90 tablet 3     aspirin 81 MG EC tablet Take 81 mg by mouth daily       atorvastatin (LIPITOR) 40 MG tablet Take 1 tablet (40 mg) by mouth daily 90 tablet 3     biotin (BIOTIN 5000) 5 MG CAPS Take 5 mg by mouth daily 30 capsule 3     blood glucose monitoring (ACCU-CHEK FASTCLIX) lancets USE TO TEST 2-3 TIMES DAILY OR AS DIRECTED. 102 each 0     cloNIDine (CATAPRES) 0.1 MG tablet Once a week; non-dialysis 12 tablet 3     fluticasone (FLONASE) 50 MCG/ACT nasal spray Spray 1 spray into both nostrils daily (Patient taking differently: Spray 1 spray into both nostrils daily as needed ) 11.1 mL 11     hydrALAZINE (APRESOLINE) 100 MG TABS tablet Take 1 tablet (100 mg) by mouth every 8 hours 90 tablet 11     lidocaine-prilocaine (EMLA) 2.5-2.5 % external cream apply 1 hour prior to dialysis  0     lisinopril (PRINIVIL/ZESTRIL) 10 MG tablet Take 1 tablet (10 mg) by mouth 2 times daily 180 tablet 3     loratadine (CLARITIN) 10 MG tablet Take 10 mg by mouth daily as needed Reported on 5/3/2017       melatonin 1 MG TABS tablet Take 2 tablets (2 mg) by mouth nightly as needed for sleep 120 tablet 1     mupirocin (BACTROBAN) 2 % external ointment Apply topically 3 times daily 15 g 0     mycophenolate (GENERIC EQUIVALENT) 250 MG capsule Take 3 capsules (750 mg) by mouth 2 times daily 180 capsule 11     NEPHROCAPS 1 MG capsule Take 1 capsule by mouth daily 120 capsule 0     pantoprazole (PROTONIX) 40 MG EC tablet Take 1 tablet (40 mg) by mouth 2 times daily (before meals) 60 tablet 11     tacrolimus (GENERIC EQUIVALENT) 1 MG capsule Take 3 capsules (3 mg) by mouth 2 times daily 180 capsule 11     traMADol (ULTRAM) 50 MG tablet Take 50 mg by mouth every 6 hours as  needed for severe pain       valACYclovir (VALTREX) 500 MG tablet Take 1 tablet (500 mg) by mouth daily for 5 days 5 tablet 0       Allergies   Allergen Reactions     Norco [Hydrocodone-Acetaminophen] Nausea and Vomiting     Cats      Throat tightness     Isosorbide Other (See Comments)     hypotension     Penicillins Hives     Seasonal Allergies      rhinitis     Shrimp      Throat closes        Review of Systems:  -Constitutional: Otherwise feeling well today, in usual state of health.  -HEENT: Patient denies nonhealing oral sores.  -Skin: As above in HPI. No additional skin concerns.    Physical exam:  Vitals: /68 (BP Location: Right arm, Patient Position: Sitting, Cuff Size: Adult Regular)   Pulse 97   GEN: This is a well developed, well-nourished male in no acute distress, in a pleasant mood.    SKIN: Full skin, which includes the head/face, both arms, chest, back, abdomen,both legs, genitalia and/or groin buttocks, digits and/or nails, was examined.  -Musa skin type: IV/V  -there is thickening and yellow-tan discoloration of the nail plate with onycholysis on toe nails  -On the R medial buttock, there is one erythematous eroded papule as well as a small cluster of erythematous punched-out superficial erosions just superior to the papule  -There is a subcutaeous nodule with a central punctum located on upper back.  Multiple regular brown pigmented macules and papules are identified on the trunk and extremities.   -There is a firm tan/flesh colored papule that dimples with lateral pressure on the lower extremities.  -No other lesions of concern on areas examined.       Impression/Plan:  1. HSV type 2: PCR positive at outside provider. Recalcitrant to prior 5 day course of valacyclovir and topical mupirocin ointment. Consider suppressive therapy if recalcitrant or recurrent.    We discussed the risks and benefits of a longer course of valacyclovir given the patient's immunosuppressed status. The  patient is agreeable to this plan    Start: valacyclovir 1000 mg BID x10 days    Discontinue topical mupirocin    Recommended OTC topical moisturizers in lieu of mupirocin     2. Onychomycosis    Recommended dilute bleach soaks     3. Dermatofibromas on the lower extremities, epidermoid cyst on the back, and multiple banal-appearing melanocytic nevi on the trunk and extremities    Discussed the natural etiology and provided reassurances about the benign nature of the lesions.        Follow-up in 1 year, earlier for new or changing lesions.       Staff Involved:  Scribe/Staff    Scribe Disclosure  I, Dominic Najjar, am serving as a scribe to document services personally performed by Dr. Darvin Leos MD, based on data collection and the provider's statements to me.    Provider Disclosure:   The documentation recorded by the scribe accurately reflects the services I personally performed and the decisions made by me.    Darvin Leos MD   of Dermatology  Department of Dermatology  [facility]

## 2019-10-21 NOTE — LETTER
10/21/2019       RE: Murray Nicholson  [address]  [place] [address]     Dear Colleague,    Thank you for referring your patient, Murray Nicholson, to the [facility] DERMATOLOGY at [facility]. Please see a copy of my visit note below.    [facility] Dermatology Note      Dermatology Problem List:  1. Heart Transplant 6/14/2018   -mycophenolate, tacrolimus  2. Hyperkeratosis/Xerosis of the distal fingers - TAC 0.1% ointment  3. Onychomycosis   -dilute bleach soaks  4. HSV type 2: PCR-proven   -valacyclovir 1000 mg BID x 10 days (initiated 10/12/19, OTC moisturizers)   -s/p valacyclovir 500 mg daily x 5 days without improvement, mupirocin  5. Epidermoid cyst- upper back  6. Dermatofibromata    Encounter Date: Oct 21, 2019    CC:  Chief Complaint   Patient presents with     Skin Check     Murray is here today for a skin check. He has one area of concern on his buttock         History of Present Illness:  Mr. Murray Nicholson is a 64 year old male who presents for a skin check. The patient reports that he has a red  and irritating rash on his buttocks that developed earlier this fall. Of note, his PCP swabbed the area and PCR was positive for HSV Type 2. He was placed on a course of valacyclovir 500 mg daily for 5 days which he has since completed. He also started mupirocin 2% ointment which he has been using regularly since 9/27/19. He believes that the rash has since persisted, and he would like it evaluated. It is tender but not overtly painful. Other than this, he would like his skin evaluated for any concerning features. He notes that his finger and toe nails have been fissuring and thickening in the past, but this has been palliated with biotin. He states that a podiatrist took a clipping of one of the nails in the past, and he was told that there was no fungus present. The patient voices no other concerns.    Past Medical History:   Patient Active Problem  List   Diagnosis     Esophageal reflux     Allergic rhinitis     CKD (chronic kidney disease) stage 5, GFR less than 15 ml/min (H)     Hypertension goal BP (blood pressure) < 130/80     Anemia of chronic disease     Dyslipidemia     MGUS (monoclonal gammopathy of unknown significance)     Ascending aortic aneurysm (H)     Anemia, iron deficiency     Anemia in stage 5 chronic kidney disease (H)     Heart transplanted (H)     Leukopenia     Heart transplant recipient (H)     Sigmoid diverticulitis     Heart replaced by transplant (H)     Aortic stenosis     Status post coronary angiogram     ESRD (end stage renal disease) (H)     Immunosuppression (H)     Type 2 diabetes mellitus (H)     Past Medical History:   Diagnosis Date     (HFpEF) heart failure with preserved ejection fraction (H)      Allergic rhinitis, cause unspecified      Anemia of chronic kidney failure      AS (aortic stenosis)      Ascending aortic aneurysm (H)      Bicuspid aortic valve      CAD (coronary artery disease)      Congestive heart failure, unspecified      Dialysis patient (H)     Tues-Thur-Sat     Dyslipidemia      Esophageal reflux      ESRD (end stage renal disease) (H)      Hearing problem      Heart replaced by transplant (H) 12/10/2018     Hypersomnia with sleep apnea, unspecified      Hypertension      Ileostomy status (H)      Immunosuppression (H)      MGUS (monoclonal gammopathy of unknown significance)      Mitral regurgitation      SHEELA (obstructive sleep apnea)     No CPAP     Pneumonia      Systolic heart failure (H)      Type 2 diabetes mellitus (H)      Past Surgical History:   Procedure Laterality Date     CARDIAC SURGERY       COLONOSCOPY N/A 5/3/2018    Procedure: COLONOSCOPY;  colonoscopy ;  Surgeon: Ammon Castillo MD;  Location:  GI     CREATE FISTULA ARTERIOVENOUS UPPER EXTREMITY BOVINE Left 5/8/2019    Procedure: Left Upper Extremity Arteriovenous Bovine Graft Creation;  Surgeon: Calin Cheney MD;  Location:  [facility] OR     CV CORONARY ANGIOGRAM N/A 6/28/2019    Procedure: CV CORONARY ANGIOGRAM;  Surgeon: Montrell Posada MD;  [facility]     CV HEART BIOPSY N/A 2/1/2019    Procedure: HBX;  Surgeon: Montrell Posada MD;  Location:  HEART CARDIAC CATH LAB     CV HEART BIOPSY N/A 3/22/2019    Procedure: HBX, RIJV ACCESS;  Surgeon: Jordan Fox MD;  [facility]     CV HEART BIOPSY N/A 6/28/2019    Procedure: CV HEART BIOPSY;  Surgeon: Montrell Posada MD;  [facility]     CV RIGHT HEART CATH N/A 6/28/2019    Procedure: CV RIGHT HEART CATH;  Surgeon: Montrell Posada MD;  Location:  HEART CARDIAC CATH LAB     ESOPHAGOSCOPY, GASTROSCOPY, DUODENOSCOPY (EGD), COMBINED N/A 5/7/2018    Procedure: COMBINED ENDOSCOPIC ULTRASOUND, ESOPHAGOSCOPY, GASTROSCOPY, DUODENOSCOPY (EGD), FINE NEEDLE ASPIRATE/BIOPSY;  Endoscopic Ultrasound with Fine Needle Aspiration ;  Surgeon: Alon Don MD;  Location: [facility]     EXAM UNDER ANESTHESIA RECTUM N/A 8/12/2018    Procedure: EXAM UNDER ANESTHESIA RECTUM;  EXAM UNDER ANESTHESIA RECTUM ,COMBINED INCISION AND DRAINAGE OF RECTAL ABCESS ;  Surgeon: Rick Tran MD;  Location: [facility]     INCISION AND DRAINAGE RECTUM, COMBINED N/A 8/12/2018    Procedure: COMBINED INCISION AND DRAINAGE RECTUM;;  Surgeon: Rick Tran MD;  [facility]     IR DIALYSIS FISTULOGRAM LEFT  9/25/2019     IR DIALYSIS PTA  9/25/2019     LAPAROSCOPIC ASSISTED COLOSTOMY TAKEDOWN N/A 12/11/2018    Procedure: Laparoscopic Assisted Colostomy Takedown, Laparoscopic Lysis of Adhesions;  Surgeon: Rick Tran MD;  [facility]     LAPAROSCOPIC ASSISTED SIGMOID COLECTOMY N/A 8/14/2018    Procedure: LAPAROSCOPIC ASSISTED SIGMOID COLECTOMY;  Laparoscopic Hand Assisted Takedown of Splenic Flexure, Sigmoidectomy, Small Bowel Resection, Takedown of Small Bowel to Colon Fistula;  Surgeon: Chelsea  Rick Izaguirre MD;  [facility]     LAPAROSCOPIC HERNIORRHAPHY INGUINAL BILATERAL Bilateral 2015    Procedure: LAPAROSCOPIC HERNIORRHAPHY INGUINAL BILATERAL;  Surgeon: Bobby Mcconnell MD;  [facility]     LAPAROSCOPIC INSERTION CATHETER PERITONEAL DIALYSIS N/A 2017    Procedure: LAPAROSCOPIC INSERTION CATHETER PERITONEAL DIALYSIS;  Laparoscopic Peritoneal Dialysis Catheter Placement - Anesthesia with block;  Surgeon: Esteban Arvizu MD;  [facility]: [facility]     PICC INSERTION Left 2018    5Fr - 49cm (3cm external), Basilic vein, low SVC     REMOVE CATHETER PERITONEAL Right 1/15/2018    Procedure: REMOVE CATHETER PERITONEAL;  Open Removal of Peritoneal Dialysis Catheter ;  Surgeon: Esteban Arvizu MD;  [facility]     SIGMOIDOSCOPY FLEXIBLE N/A 2018    Procedure: Examination Under Anesthesia, Flexible Sigmoidoscopy and Polypectomy;  Surgeon: Rick Tran MD;  [facility]     SIGMOIDOSCOPY FLEXIBLE N/A 2018    Procedure: Flexible Sigmoidoscopy;  Surgeon: Rick Tran MD;  [facility]     TAKEDOWN ILEOSTOMY N/A 3/27/2019    Procedure: Takedown Ileostomy;  Surgeon: Rick Tran MD;  [facility]     TRANSPLANT HEART RECIPIENT N/A 2018    Procedure: TRANSPLANT HEART RECIPIENT;  Median Sternotomy, on-pump oxygenator, Heart Transplant;  Surgeon: Rony Caputo MD;  Location[facility]       Social History:  Patient reports that he quit smoking about 25 years ago. His smoking use included cigarettes. He started smoking about 35 years ago. He has a 20.00 pack-year smoking history. He has never used smokeless tobacco. He reports that he does not drink alcohol or use drugs.    Family History:  Family History   Problem Relation Age of Onset     C.A.D. Father          from-never knew father-age 60     Diabetes Father      Cerebrovascular Disease Father      Hypertension Father      Hypertension No family hx of      Breast  Cancer No family hx of      Cancer - colorectal No family hx of      Prostate Cancer No family hx of      Kidney Disease No family hx of      Melanoma No family hx of      Skin Cancer No family hx of        Medications:  Current Outpatient Medications   Medication Sig Dispense Refill     acetaminophen (TYLENOL) 500 MG tablet Take 2 tablets (1,000 mg) by mouth 4 times daily (Patient taking differently: Take 1,000 mg by mouth every 6 hours as needed ) 60 tablet 1     amLODIPine (NORVASC) 10 MG tablet Take 1 tablet (10 mg) by mouth daily 90 tablet 3     aspirin 81 MG EC tablet Take 81 mg by mouth daily       atorvastatin (LIPITOR) 40 MG tablet Take 1 tablet (40 mg) by mouth daily 90 tablet 3     biotin (BIOTIN 5000) 5 MG CAPS Take 5 mg by mouth daily 30 capsule 3     blood glucose monitoring (ACCU-CHEK FASTCLIX) lancets USE TO TEST 2-3 TIMES DAILY OR AS DIRECTED. 102 each 0     cloNIDine (CATAPRES) 0.1 MG tablet Once a week; non-dialysis 12 tablet 3     fluticasone (FLONASE) 50 MCG/ACT nasal spray Spray 1 spray into both nostrils daily (Patient taking differently: Spray 1 spray into both nostrils daily as needed ) 11.1 mL 11     hydrALAZINE (APRESOLINE) 100 MG TABS tablet Take 1 tablet (100 mg) by mouth every 8 hours 90 tablet 11     lidocaine-prilocaine (EMLA) 2.5-2.5 % external cream apply 1 hour prior to dialysis  0     lisinopril (PRINIVIL/ZESTRIL) 10 MG tablet Take 1 tablet (10 mg) by mouth 2 times daily 180 tablet 3     loratadine (CLARITIN) 10 MG tablet Take 10 mg by mouth daily as needed Reported on 5/3/2017       melatonin 1 MG TABS tablet Take 2 tablets (2 mg) by mouth nightly as needed for sleep 120 tablet 1     mupirocin (BACTROBAN) 2 % external ointment Apply topically 3 times daily 15 g 0     mycophenolate (GENERIC EQUIVALENT) 250 MG capsule Take 3 capsules (750 mg) by mouth 2 times daily 180 capsule 11     NEPHROCAPS 1 MG capsule Take 1 capsule by mouth daily 120 capsule 0     pantoprazole (PROTONIX) 40  MG EC tablet Take 1 tablet (40 mg) by mouth 2 times daily (before meals) 60 tablet 11     tacrolimus (GENERIC EQUIVALENT) 1 MG capsule Take 3 capsules (3 mg) by mouth 2 times daily 180 capsule 11     traMADol (ULTRAM) 50 MG tablet Take 50 mg by mouth every 6 hours as needed for severe pain       valACYclovir (VALTREX) 500 MG tablet Take 1 tablet (500 mg) by mouth daily for 5 days 5 tablet 0       Allergies   Allergen Reactions     Norco [Hydrocodone-Acetaminophen] Nausea and Vomiting     Cats      Throat tightness     Isosorbide Other (See Comments)     hypotension     Penicillins Hives     Seasonal Allergies      rhinitis     Shrimp      Throat closes        Review of Systems:  -Constitutional: Otherwise feeling well today, in usual state of health.  -HEENT: Patient denies nonhealing oral sores.  -Skin: As above in HPI. No additional skin concerns.    Physical exam:  Vitals: /68 (BP Location: Right arm, Patient Position: Sitting, Cuff Size: Adult Regular)   Pulse 97   GEN: This is a well developed, well-nourished male in no acute distress, in a pleasant mood.    SKIN: Full skin, which includes the head/face, both arms, chest, back, abdomen,both legs, genitalia and/or groin buttocks, digits and/or nails, was examined.  -Musa skin type: IV/V  -there is thickening and yellow-tan discoloration of the nail plate with onycholysis on toe nails  -On the R medial buttock, there is one erythematous eroded papule as well as a small cluster of erythematous punched-out superficial erosions just superior to the papule  -There is a subcutaeous nodule with a central punctum located on upper back.  Multiple regular brown pigmented macules and papules are identified on the trunk and extremities.   -There is a firm tan/flesh colored papule that dimples with lateral pressure on the lower extremities.  -No other lesions of concern on areas examined.       Impression/Plan:  1. HSV type 2: PCR positive at outside provider.  Recalcitrant to prior 5 day course of valacyclovir and topical mupirocin ointment. Consider suppressive therapy if recalcitrant or recurrent.    We discussed the risks and benefits of a longer course of valacyclovir given the patient's immunosuppressed status. The patient is agreeable to this plan    Start: valacyclovir 1000 mg BID x10 days    Discontinue topical mupirocin    Recommended OTC topical moisturizers in lieu of mupirocin     2. Onychomycosis    Recommended dilute bleach soaks     3. Dermatofibromas on the lower extremities, epidermoid cyst on the back, and multiple banal-appearing melanocytic nevi on the trunk and extremities    Discussed the natural etiology and provided reassurances about the benign nature of the lesions.        Follow-up in 1 year, earlier for new or changing lesions.       Staff Involved:  Scribe/Staff    Scribe Disclosure  I, Dominic Najjar, am serving as a scribe to document services personally performed by Dr. Darvin Leos MD, based on data collection and the provider's statements to me.    Provider Disclosure:   The documentation recorded by the scribe accurately reflects the services I personally performed and the decisions made by me.    Darvin Leos MD   of Dermatology  Department of Dermatology  [facility] [employer]

## 2019-10-21 NOTE — PATIENT INSTRUCTIONS
"The recipe for \"dilute bleach soaks\" is as follows:    1. Use 1 teaspoon of plain, unscented bleach to a half-gallon of lukewarm water. On the bottle, bleach might be called \"sodium hypochlorite.\" These mean the same thing. The concentration should be about 6 to 8.75%. Do NOT use bleach called \"concentrated,\" \"splashless,\" or having a fragrance. A generic/store brand is okay.  2. Soak a CLEAN wash cloth in the mixture and wring it out.  3. Apply the damp wash cloth to the affected area for about 10 minutes. Pat the area dry.   4. Repeat this weekly.  "

## 2019-10-23 ENCOUNTER — PRE VISIT (OUTPATIENT)
Dept: TRANSPLANT | Facility: CLINIC | Age: 64
End: 2019-10-23

## 2019-10-23 DIAGNOSIS — Z94.1 HEART REPLACED BY TRANSPLANT (H): Primary | ICD-10-CM

## 2019-10-23 DIAGNOSIS — Z13.6 ENCOUNTER FOR LIPID SCREENING FOR CARDIOVASCULAR DISEASE: ICD-10-CM

## 2019-10-23 DIAGNOSIS — Z13.220 ENCOUNTER FOR LIPID SCREENING FOR CARDIOVASCULAR DISEASE: ICD-10-CM

## 2019-10-23 LAB
CELL TYPE ALLO: NORMAL
CHANNELSHIFTALLOB1: -64
CHANNELSHIFTALLOB2: -60
CHANNELSHIFTALLOT1: -80
CHANNELSHIFTALLOT2: -70
COMMENT ALLOB2: NORMAL
CROSSMATCHDATEALLO: NORMAL
DONOR ALLO: NORMAL
DONORCELLDATE ALLO: NORMAL
POS CUT OFF ALLO B: >101
POS CUT OFF ALLO T: >67
RESULT ALLO B1: NORMAL
RESULT ALLO B2: NORMAL
RESULT ALLO T1: NORMAL
RESULT ALLO T2: NORMAL
SERUM DATE ALLO B1: NORMAL
SERUM DATE ALLO B2: NORMAL
SERUM DATE ALLO T1: NORMAL
SERUM DATE ALLO T2: NORMAL
TESTMETHODALLO: NORMAL
TREATMENT ALLO B1: NORMAL
TREATMENT ALLO B2: NORMAL
TREATMENT ALLO T1: NORMAL
TREATMENT ALLO T2: NORMAL

## 2019-10-23 RX ORDER — LIDOCAINE 40 MG/G
CREAM TOPICAL
Status: CANCELLED | OUTPATIENT
Start: 2019-10-23

## 2019-10-24 ENCOUNTER — TELEPHONE (OUTPATIENT)
Dept: DERMATOLOGY | Facility: CLINIC | Age: 64
End: 2019-10-24

## 2019-10-24 ENCOUNTER — TELEPHONE (OUTPATIENT)
Dept: FAMILY MEDICINE | Facility: CLINIC | Age: 64
End: 2019-10-24

## 2019-10-24 DIAGNOSIS — I10 HYPERTENSION GOAL BP (BLOOD PRESSURE) < 130/80: ICD-10-CM

## 2019-10-24 NOTE — TELEPHONE ENCOUNTER
Pt states last night he felt hallucinations. States he felt fine today but when he closes his eyes and tries to sleep he starts having hallucinations. Describes things coming at him, seeing shapes and things that are not there. Hallucinations are listed on side effects of when to contact provider. Pt feels fine now but as he stated it is when he tries to sleep. He is due for his next dose of valtrex this evening. Writer recommended to hold of on taking until he hears from the [facility]. Patient can be reached at number on file.    Pt understands if symptoms return or worsen before hearing back to seek medical care. He also stated his kidney specialist advised him to stop taking the medication.

## 2019-10-24 NOTE — TELEPHONE ENCOUNTER
Stop medication for now. Hold for Dr. Turcios to comment on tomorrow 10/25/19 when in clinic.   [place] Rosen PA-C

## 2019-10-24 NOTE — TELEPHONE ENCOUNTER
Patient reports hallucinations with valacyclovir 1000 mg BID. Patient has ESRD with last creatinine clearance hovering around 10 mL/min. Renal dosing for creatinine clearance <10 mL/min is 500 mg daily, and for creatinine clearance 10-29 mL/min is 1000 mg daily. Discussed with the patient that his hallucinations are a known adverse effect of valacyclovir, and he has been taking a high dose for his level of kidney function. Agree with nephrologist to stop the medication now. He will continue to have therapeutic levels of circulating medication for probably at least an additional day. Discussed that he could wait a day and then restart at perhaps 1000 mg daily and see if the hallucinations return, but patient understadably opted to stop the medication.

## 2019-10-24 NOTE — TELEPHONE ENCOUNTER
[facility] Call Center    Phone Message    May a detailed message be left on voicemail: yes    Reason for Call: Other: Pt requesting call back to ask side effect questions on Rx valACYclovir (VALTREX) 1000 mg tablet      Action Taken: Message routed to:  [facility] ([facility] derm

## 2019-10-24 NOTE — TELEPHONE ENCOUNTER
Pt notified of message from [place] below  Will wait for Dr Turcios to weigh in tomorrow  Jacquelin Ash RN

## 2019-10-25 RX ORDER — AMLODIPINE BESYLATE 10 MG/1
10 TABLET ORAL DAILY
Qty: 90 TABLET | Refills: 3 | Status: ON HOLD | OUTPATIENT
Start: 2019-10-25 | End: 2019-12-22

## 2019-10-28 ENCOUNTER — APPOINTMENT (OUTPATIENT)
Dept: LAB | Facility: CLINIC | Age: 64
End: 2019-10-28
Payer: MEDICARE

## 2019-10-28 ENCOUNTER — OFFICE VISIT (OUTPATIENT)
Dept: CARDIOLOGY | Facility: CLINIC | Age: 64
End: 2019-10-28
Attending: INTERNAL MEDICINE
Payer: MEDICARE

## 2019-10-28 ENCOUNTER — HOSPITAL ENCOUNTER (OUTPATIENT)
Facility: CLINIC | Age: 64
Discharge: HOME | End: 2019-10-28
Attending: INTERNAL MEDICINE | Admitting: INTERNAL MEDICINE
Payer: MEDICARE

## 2019-10-28 ENCOUNTER — TELEPHONE (OUTPATIENT)
Dept: DERMATOLOGY | Facility: CLINIC | Age: 64
End: 2019-10-28

## 2019-10-28 ENCOUNTER — APPOINTMENT (OUTPATIENT)
Dept: MEDSURG UNIT | Facility: CLINIC | Age: 64
End: 2019-10-28
Payer: MEDICARE

## 2019-10-28 VITALS
DIASTOLIC BLOOD PRESSURE: 75 MMHG | OXYGEN SATURATION: 100 % | RESPIRATION RATE: 18 BRPM | WEIGHT: 162.04 LBS | SYSTOLIC BLOOD PRESSURE: 139 MMHG | BODY MASS INDEX: 25.43 KG/M2 | HEIGHT: 67 IN | TEMPERATURE: 97.7 F

## 2019-10-28 VITALS
OXYGEN SATURATION: 100 % | HEIGHT: 68 IN | HEART RATE: 38 BPM | BODY MASS INDEX: 23.86 KG/M2 | SYSTOLIC BLOOD PRESSURE: 134 MMHG | DIASTOLIC BLOOD PRESSURE: 74 MMHG | WEIGHT: 157.41 LBS

## 2019-10-28 DIAGNOSIS — Z94.1 HEART TRANSPLANTED (H): ICD-10-CM

## 2019-10-28 DIAGNOSIS — Z94.1 HEART REPLACED BY TRANSPLANT (H): ICD-10-CM

## 2019-10-28 DIAGNOSIS — Z13.220 ENCOUNTER FOR LIPID SCREENING FOR CARDIOVASCULAR DISEASE: ICD-10-CM

## 2019-10-28 DIAGNOSIS — I10 HYPERTENSION GOAL BP (BLOOD PRESSURE) < 130/80: ICD-10-CM

## 2019-10-28 DIAGNOSIS — Z94.1 HEART REPLACED BY TRANSPLANT (H): Primary | ICD-10-CM

## 2019-10-28 DIAGNOSIS — Z94.1 HEART TRANSPLANT RECIPIENT (H): ICD-10-CM

## 2019-10-28 DIAGNOSIS — Z13.6 ENCOUNTER FOR LIPID SCREENING FOR CARDIOVASCULAR DISEASE: ICD-10-CM

## 2019-10-28 LAB
ALBUMIN SERPL-MCNC: 3.8 G/DL (ref 3.4–5)
ALP SERPL-CCNC: 318 U/L (ref 40–150)
ALT SERPL W P-5'-P-CCNC: 14 U/L (ref 0–70)
ANION GAP SERPL CALCULATED.3IONS-SCNC: 11 MMOL/L (ref 3–14)
AST SERPL W P-5'-P-CCNC: 11 U/L (ref 0–45)
BASOPHILS # BLD AUTO: 0 10E9/L (ref 0–0.2)
BASOPHILS NFR BLD AUTO: 1.4 %
BILIRUB SERPL-MCNC: 0.7 MG/DL (ref 0.2–1.3)
BUN SERPL-MCNC: 56 MG/DL (ref 7–30)
CALCIUM SERPL-MCNC: 8.7 MG/DL (ref 8.5–10.1)
CHLORIDE SERPL-SCNC: 104 MMOL/L (ref 94–109)
CHOLEST SERPL-MCNC: 120 MG/DL
CO2 SERPL-SCNC: 20 MMOL/L (ref 20–32)
CREAT SERPL-MCNC: 9.71 MG/DL (ref 0.66–1.25)
DIFFERENTIAL METHOD BLD: ABNORMAL
EOSINOPHIL # BLD AUTO: 0.1 10E9/L (ref 0–0.7)
EOSINOPHIL NFR BLD AUTO: 2.8 %
ERYTHROCYTE [DISTWIDTH] IN BLOOD BY AUTOMATED COUNT: 14.9 % (ref 10–15)
GFR SERPL CREATININE-BSD FRML MDRD: 5 ML/MIN/{1.73_M2}
GLUCOSE SERPL-MCNC: 124 MG/DL (ref 70–99)
HCT VFR BLD AUTO: 32.7 % (ref 40–53)
HDLC SERPL-MCNC: 58 MG/DL
HGB BLD-MCNC: 10.3 G/DL (ref 13.3–17.7)
IMM GRANULOCYTES # BLD: 0 10E9/L (ref 0–0.4)
IMM GRANULOCYTES NFR BLD: 0.9 %
LDLC SERPL CALC-MCNC: 37 MG/DL
LYMPHOCYTES # BLD AUTO: 0.8 10E9/L (ref 0.8–5.3)
LYMPHOCYTES NFR BLD AUTO: 35.9 %
MAGNESIUM SERPL-MCNC: 1.2 MG/DL (ref 1.6–2.3)
MCH RBC QN AUTO: 31.5 PG (ref 26.5–33)
MCHC RBC AUTO-ENTMCNC: 31.5 G/DL (ref 31.5–36.5)
MCV RBC AUTO: 100 FL (ref 78–100)
MONOCYTES # BLD AUTO: 0.4 10E9/L (ref 0–1.3)
MONOCYTES NFR BLD AUTO: 17.1 %
NEUTROPHILS # BLD AUTO: 0.9 10E9/L (ref 1.6–8.3)
NEUTROPHILS NFR BLD AUTO: 41.9 %
NONHDLC SERPL-MCNC: 63 MG/DL
PHOSPHATE SERPL-MCNC: 4.4 MG/DL (ref 2.5–4.5)
PLATELET # BLD AUTO: 208 10E9/L (ref 150–450)
POTASSIUM SERPL-SCNC: 4.8 MMOL/L (ref 3.4–5.3)
PROT SERPL-MCNC: 7.6 G/DL (ref 6.8–8.8)
RBC # BLD AUTO: 3.27 10E12/L (ref 4.4–5.9)
SODIUM SERPL-SCNC: 135 MMOL/L (ref 133–144)
TACROLIMUS BLD-MCNC: 11.8 UG/L (ref 5–15)
TME LAST DOSE: NORMAL H
TRIGL SERPL-MCNC: 130 MG/DL
WBC # BLD AUTO: 2.2 10E9/L (ref 4–11)

## 2019-10-28 PROCEDURE — 80053 COMPREHEN METABOLIC PANEL: CPT | Performed by: INTERNAL MEDICINE

## 2019-10-28 PROCEDURE — 27210794 ZZH OR GENERAL SUPPLY STERILE: Performed by: INTERNAL MEDICINE

## 2019-10-28 PROCEDURE — 93505 ENDOMYOCARDIAL BIOPSY: CPT | Performed by: INTERNAL MEDICINE

## 2019-10-28 PROCEDURE — G0463 HOSPITAL OUTPT CLINIC VISIT: HCPCS | Mod: 25

## 2019-10-28 PROCEDURE — 36415 COLL VENOUS BLD VENIPUNCTURE: CPT | Performed by: INTERNAL MEDICINE

## 2019-10-28 PROCEDURE — 88346 IMFLUOR 1ST 1ANTB STAIN PX: CPT | Performed by: INTERNAL MEDICINE

## 2019-10-28 PROCEDURE — 40000166 ZZH STATISTIC PP CARE STAGE 1

## 2019-10-28 PROCEDURE — 99214 OFFICE O/P EST MOD 30 MIN: CPT | Mod: ZP | Performed by: INTERNAL MEDICINE

## 2019-10-28 PROCEDURE — 80197 ASSAY OF TACROLIMUS: CPT | Performed by: INTERNAL MEDICINE

## 2019-10-28 PROCEDURE — 25000125 ZZHC RX 250: Performed by: INTERNAL MEDICINE

## 2019-10-28 PROCEDURE — 88350 IMFLUOR EA ADDL 1ANTB STN PX: CPT | Performed by: INTERNAL MEDICINE

## 2019-10-28 PROCEDURE — 85027 COMPLETE CBC AUTOMATED: CPT | Performed by: INTERNAL MEDICINE

## 2019-10-28 PROCEDURE — C1894 INTRO/SHEATH, NON-LASER: HCPCS | Performed by: INTERNAL MEDICINE

## 2019-10-28 PROCEDURE — 85004 AUTOMATED DIFF WBC COUNT: CPT | Performed by: INTERNAL MEDICINE

## 2019-10-28 PROCEDURE — 25000132 ZZH RX MED GY IP 250 OP 250 PS 637: Mod: GY | Performed by: NURSE PRACTITIONER

## 2019-10-28 PROCEDURE — 84100 ASSAY OF PHOSPHORUS: CPT | Performed by: INTERNAL MEDICINE

## 2019-10-28 PROCEDURE — 80061 LIPID PANEL: CPT | Performed by: INTERNAL MEDICINE

## 2019-10-28 PROCEDURE — 83735 ASSAY OF MAGNESIUM: CPT | Performed by: INTERNAL MEDICINE

## 2019-10-28 PROCEDURE — 88307 TISSUE EXAM BY PATHOLOGIST: CPT | Performed by: INTERNAL MEDICINE

## 2019-10-28 RX ORDER — LIDOCAINE 40 MG/G
CREAM TOPICAL
Status: COMPLETED | OUTPATIENT
Start: 2019-10-28 | End: 2019-10-28

## 2019-10-28 RX ORDER — TACROLIMUS 1 MG/1
CAPSULE ORAL
Qty: 150 CAPSULE | Refills: 11 | Status: SHIPPED | OUTPATIENT
Start: 2019-10-28 | End: 2019-11-08

## 2019-10-28 RX ORDER — MYCOPHENOLATE MOFETIL 250 MG/1
500 CAPSULE ORAL 2 TIMES DAILY
Qty: 120 CAPSULE | Refills: 11 | Status: ON HOLD | OUTPATIENT
Start: 2019-10-28 | End: 2019-12-22

## 2019-10-28 RX ORDER — LISINOPRIL 10 MG/1
15 TABLET ORAL 2 TIMES DAILY
Qty: 90 TABLET | Refills: 11 | Status: SHIPPED | OUTPATIENT
Start: 2019-10-28 | End: 2019-11-18 | Stop reason: ALTCHOICE

## 2019-10-28 RX ORDER — MAGNESIUM OXIDE 400 MG/1
800 TABLET ORAL ONCE
Status: COMPLETED | OUTPATIENT
Start: 2019-10-28 | End: 2019-10-28

## 2019-10-28 RX ADMIN — LIDOCAINE: 40 CREAM TOPICAL at 10:15

## 2019-10-28 RX ADMIN — MAGNESIUM OXIDE TAB 400 MG (241.3 MG ELEMENTAL MG) 800 MG: 400 (241.3 MG) TAB at 10:57

## 2019-10-28 ASSESSMENT — MIFFLIN-ST. JEOR
SCORE: 1483.63
SCORE: 1474.06
SCORE: 1483.63

## 2019-10-28 ASSESSMENT — PAIN SCALES - GENERAL: PAINLEVEL: NO PAIN (0)

## 2019-10-28 NOTE — IP AVS SNAPSHOT
MRN:[id number]                      After Visit Summary   10/28/2019    Murray Nicholson    MRN: [id number]           Visit Information        Department      10/28/2019  9:17 AM [address] [facility]          Review of your medicines      UNREVIEWED medicines. Ask your doctor about these medicines       Dose / Directions   acetaminophen 500 MG tablet  Commonly known as:  TYLENOL  Used for:  Ileostomy status (H)      Dose:  1,000 mg  Take 2 tablets (1,000 mg) by mouth 4 times daily  Quantity:  60 tablet  Refills:  1     amLODIPine 10 MG tablet  Commonly known as:  NORVASC  Used for:  Hypertension goal BP (blood pressure) < 130/80      Dose:  10 mg  Take 1 tablet (10 mg) by mouth daily  Quantity:  90 tablet  Refills:  3     aspirin 81 MG EC tablet      Dose:  81 mg  Take 81 mg by mouth daily  Refills:  0     atorvastatin 40 MG tablet  Commonly known as:  LIPITOR  Used for:  Heart replaced by transplant (H)      Dose:  40 mg  Take 1 tablet (40 mg) by mouth daily  Quantity:  90 tablet  Refills:  3     biotin 5 MG Caps  Commonly known as:  BIOTIN 5000  Used for:  Brittle nails      Dose:  5 mg  Take 5 mg by mouth daily  Quantity:  30 capsule  Refills:  3     cloNIDine 0.1 MG tablet  Commonly known as:  CATAPRES  Used for:  Essential hypertension, Heart replaced by transplant (H)      Once a week; non-dialysis  Quantity:  12 tablet  Refills:  3     fluticasone 50 MCG/ACT nasal spray  Commonly known as:  FLONASE  Used for:  Runny nose      Dose:  1 spray  Spray 1 spray into both nostrils daily  Quantity:  11.1 mL  Refills:  11     hydrALAZINE 100 MG tablet  Commonly known as:  APRESOLINE  Used for:  Essential hypertension      Dose:  100 mg  Take 1 tablet (100 mg) by mouth every 8 hours  Quantity:  90 tablet  Refills:  11     lidocaine-prilocaine 2.5-2.5 % external cream  Commonly known as:  EMLA      apply 1 hour prior to dialysis  Refills:  0     lisinopril 10 MG tablet  Commonly known as:   PRINIVIL/ZESTRIL  Indication:  High Blood Pressure Disorder  Used for:  Hypertension goal BP (blood pressure) < 130/80      Dose:  10 mg  Take 1 tablet (10 mg) by mouth 2 times daily  Quantity:  180 tablet  Refills:  3     loratadine 10 MG tablet  Commonly known as:  CLARITIN  Used for:  Hypertensive cardiopathy, SOB (shortness of breath)      Dose:  10 mg  Take 10 mg by mouth daily as needed Reported on 5/3/2017  Refills:  0     melatonin 1 MG Tabs tablet  Used for:  Heart transplanted (H)      Dose:  2 mg  Take 2 tablets (2 mg) by mouth nightly as needed for sleep  Quantity:  120 tablet  Refills:  1     multivitamin RENAL 1 MG capsule  Used for:  End stage renal disease (H)      Dose:  1 capsule  Take 1 capsule by mouth daily  Quantity:  120 capsule  Refills:  0     mupirocin 2 % external ointment  Commonly known as:  BACTROBAN  Used for:  Skin ulcer, limited to breakdown of skin (H)      Apply topically 3 times daily  Quantity:  15 g  Refills:  0     mycophenolate 250 MG capsule  Commonly known as:  GENERIC EQUIVALENT  Used for:  Heart transplanted (H)      Dose:  750 mg  Take 3 capsules (750 mg) by mouth 2 times daily  Quantity:  180 capsule  Refills:  11     pantoprazole 40 MG EC tablet  Commonly known as:  PROTONIX  Indication:  GI prophylaxis  Used for:  Duodenitis      Dose:  40 mg  Take 1 tablet (40 mg) by mouth 2 times daily (before meals)  Quantity:  60 tablet  Refills:  11     tacrolimus 1 MG capsule  Commonly known as:  GENERIC EQUIVALENT  Used for:  Heart transplant recipient (H)      Dose:  3 mg  Take 3 capsules (3 mg) by mouth 2 times daily  Quantity:  180 capsule  Refills:  11     traMADol 50 MG tablet  Commonly known as:  ULTRAM      Dose:  50 mg  Take 50 mg by mouth every 6 hours as needed for severe pain  Refills:  0     * valACYclovir 500 MG tablet  Commonly known as:  VALTREX  Used for:  Herpes simplex virus infection      Dose:  500 mg  Take 1 tablet (500 mg) by mouth daily for 5  days  Quantity:  5 tablet  Refills:  0     * valACYclovir 1000 mg tablet  Commonly known as:  VALTREX  Used for:  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 Read the directions carefully, and ask your doctor or other care provider to review them with you.            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  Quantity:  102 each  Refills:  0              Protect others around you: Learn how to safely use, store and throw away your medicines at www.disposemymeds.org.       Follow-ups after your visit       Your next 10 appointments already scheduled    Oct 28, 2019  Procedure with Marcelo Ramírez MD  [facility], [facility],  Heart Cath Lab ([facility], [facility]) [address] [place] [id number] [phone]   The [facility] is located on the corner of [address] and [facility] on the [facility]. It is easily accessible from virtually any point in the [place] area, via [employer] and [id number].   Oct 28, 2019  4:00 PM CDT  (Arrive by 3:45 PM)  RETURN HEART TRANSPLANT with Klever Ernst MD  [facility] ([facility]) [address]  [address] [place] [id number] [phone]         Care Instructions       Further instructions from your care team       [facility]                        Interventional Cardiology  Discharge Instructions   Post Right Heart Cath      AFTER YOU GO HOME:    DO drink plenty of  fluids    DO resume your regular diet and medications unless otherwise instructed by your Primary Physician    Do Not scrub the procedure site vigorously    No lotion or powder to the puncture site for 3 days    CALL YOUR PRIMARY PHYSICIAN IF: You may resume all normal activity.  Monitor neck site for bleeding, swelling, or voice changes. If you notice bleeding or swelling immediately apply pressure to the site and call number below to speak with Cardiology Fellow.  If you experience any changes in your breathing you should call your doctor immediately or come to the closest Emergency Department.  Do not drive yourself.    ADDITIONAL INSTRUCTIONS: Medications: You are to resume all home medications including anticoagulation therapy unless otherwise advised by your primary cardiologist or nurse coordinator.    Follow Up: Per your primary cardiology team    If you have any questions or concerns regarding your procedure site please call [phone] at anytime and ask for Cardiology Fellow on call.  They are available 24 hours a day.  You may also contact the Cardiology Clinic after hours number at [phone].                                                       Telephone Numbers [phone] Monday-Friday 8:00 am to 4:30 pm    [phone] After 4:30 pm Monday-Friday, Weekends & Holidays  Ask for Interventional Cardiologist on call. Someone is on call 24 hours/day   [facility] toll free number [phone] Monday-Friday 8:00 am to 4:30 pm   [facility] Emergency Dept [phone]                   Additional Information About Your Visit       [employer] Information    [employer] gives you secure access to your electronic health record. If you see a primary care provider, you can also send messages to your care team and make appointments. If you have questions, please call your primary care clinic.  If you do not have a primary care provider, please call [phone] and they will assist you.       Care EveryWhere ID     This is your Care EveryWhere ID. This could be used by other organizations to access your [facility] medical records  [id number]        Primary Care Provider Office Phone # Fax #    Yahir Turcios [phone]      Equal Access to Services    SHARONPRISCA ANALISA : Hadii marsha wang hadroddyo Soomaali, waaxda luqadaha, qaybta kaalmada adeegyada, jose heshamin hayaaalexa rich[address] la'hankalexa ayala. So [facility] [phone].    ATENCIÓN: Si habla español, tiene a ortiz disposición servicios gratuitos de asistencia lingüística. Llame al [phone].    We comply with applicable federal civil rights laws and [place] laws. We do not discriminate on the basis of race, color, national origin, age, disability, sex, sexual orientation, or gender identity.           Thank you!    Thank you for choosing [facility] for your care. Our goal is always to provide you with excellent care. Hearing back from our patients is one way we can continue to improve our services. Please take a few minutes to complete the written survey that you may receive in the mail after you visit with us. Thank you!            Medication List      Medications          Morning Afternoon Evening Bedtime As Needed    blood glucose monitoring lancets  INSTRUCTIONS:  USE TO TEST 2-3 TIMES DAILY OR AS DIRECTED.                       ASK your doctor about these medications          Morning Afternoon Evening Bedtime As Needed    acetaminophen 500 MG tablet  Also known as:  TYLENOL  INSTRUCTIONS:  Take 2 tablets (1,000 mg) by mouth 4 times daily                     amLODIPine 10 MG tablet  Also known as:  NORVASC  INSTRUCTIONS:  Take 1 tablet (10 mg) by mouth daily                     aspirin 81 MG EC tablet  INSTRUCTIONS:  Take 81 mg by mouth daily                     atorvastatin 40 MG tablet  Also known as:  LIPITOR  INSTRUCTIONS:  Take 1 tablet (40 mg) by mouth daily                     biotin 5 MG Caps  Also known as:  BIOTIN 5000  INSTRUCTIONS:  Take 5 mg by mouth  daily                     cloNIDine 0.1 MG tablet  Also known as:  CATAPRES  INSTRUCTIONS:  Once a week; non-dialysis                     fluticasone 50 MCG/ACT nasal spray  Also known as:  FLONASE  INSTRUCTIONS:  Spray 1 spray into both nostrils daily  Doctor's comments:  Okay to dispense generic equivalent, other formulation, or similar medication covered by patient's insurance                     hydrALAZINE 100 MG tablet  Also known as:  APRESOLINE  INSTRUCTIONS:  Take 1 tablet (100 mg) by mouth every 8 hours                     lidocaine-prilocaine 2.5-2.5 % external cream  Also known as:  EMLA  INSTRUCTIONS:  apply 1 hour prior to dialysis                     lisinopril 10 MG tablet  Also known as:  PRINIVIL/ZESTRIL  INSTRUCTIONS:  Take 1 tablet (10 mg) by mouth 2 times daily  Doctor's comments:  Dose adjustment  Reason for med:  High Blood Pressure Disorder                     loratadine 10 MG tablet  Also known as:  CLARITIN  INSTRUCTIONS:  Take 10 mg by mouth daily as needed Reported on 5/3/2017                     melatonin 1 MG Tabs tablet  INSTRUCTIONS:  Take 2 tablets (2 mg) by mouth nightly as needed for sleep                     multivitamin RENAL 1 MG capsule  INSTRUCTIONS:  Take 1 capsule by mouth daily                     mupirocin 2 % external ointment  Also known as:  BACTROBAN  INSTRUCTIONS:  Apply topically 3 times daily                     mycophenolate 250 MG capsule  Also known as:  GENERIC EQUIVALENT  INSTRUCTIONS:  Take 3 capsules (750 mg) by mouth 2 times daily                     pantoprazole 40 MG EC tablet  Also known as:  PROTONIX  INSTRUCTIONS:  Take 1 tablet (40 mg) by mouth 2 times daily (before meals)  Reason for med:  GI prophylaxis                     tacrolimus 1 MG capsule  Also known as:  GENERIC EQUIVALENT  INSTRUCTIONS:  Take 3 capsules (3 mg) by mouth 2 times daily                     traMADol 50 MG tablet  Also known as:  ULTRAM  INSTRUCTIONS:  Take 50 mg by mouth every 6  hours as needed for severe pain                     * valACYclovir 500 MG tablet  Also known as:  VALTREX  INSTRUCTIONS:  Take 1 tablet (500 mg) by mouth daily for 5 days                     * valACYclovir 1000 mg tablet  Also known as:  VALTREX  INSTRUCTIONS:  Take 1 tablet (1,000 mg) by mouth 2 times daily for 10 days                        * This list has 2 medication(s) that are the same as other medications prescribed for you. Read the directions carefully, and ask your doctor or other care provider to review them with you.

## 2019-10-28 NOTE — NURSING NOTE
Chief Complaint   Patient presents with     Follow Up     [facility] Return Heart Transplant     Vitals were taken and medications reconciled.     [place] CMA  3:26 PM

## 2019-10-28 NOTE — DISCHARGE INSTRUCTIONS
[facility]                        Interventional Cardiology  Discharge Instructions   Post Right Heart Cath      AFTER YOU GO HOME:    DO drink plenty of fluids    DO resume your regular diet and medications unless otherwise instructed by your Primary Physician    Do Not scrub the procedure site vigorously    No lotion or powder to the puncture site for 3 days    CALL YOUR PRIMARY PHYSICIAN IF: You may resume all normal activity.  Monitor neck site for bleeding, swelling, or voice changes. If you notice bleeding or swelling immediately apply pressure to the site and call number below to speak with Cardiology Fellow.  If you experience any changes in your breathing you should call your doctor immediately or come to the closest Emergency Department.  Do not drive yourself.    ADDITIONAL INSTRUCTIONS: Medications: You are to resume all home medications including anticoagulation therapy unless otherwise advised by your primary cardiologist or nurse coordinator.    Follow Up: Per your primary cardiology team    If you have any questions or concerns regarding your procedure site please call [phone] at anytime and ask for Cardiology Fellow on call.  They are available 24 hours a day.  You may also contact the Cardiology Clinic after hours number at [phone].                                                       Telephone Numbers [phone] Monday-Friday 8:00 am to 4:30 pm    [phone] After 4:30 pm Monday-Friday, Weekends & Holidays  Ask for Interventional Cardiologist on call. Someone is on call 24 hours/day   [facility] toll free number [phone] Monday-Friday 8:00 am to 4:30 pm   [facility] Emergency Dept [phone]

## 2019-10-28 NOTE — TELEPHONE ENCOUNTER
----- Message from Darvin Leos MD sent at 10/25/2019  1:20 PM CDT -----  Jairo Hanson,    Can you reach out to the patient and ask him about his symptoms? He was taking valacyclovir at too high of a dose for his kidney disease. Please make sure he's stopped the valacyclovir.    Thank you,  Darvin Leos MD   of Dermatology  Department of Dermatology  [facility]

## 2019-10-28 NOTE — PROGRESS NOTES
[facility]   Interventional Cardiology        Consenting/Education for Endomyocardial Biopsy     Patient understands as a part of routine surveillance after heart transplant we would like to perform an endomyocardial biopsy.  This procedure will be performed by Dr. Ramírez.    Patient understands a fine tube (catheter) is put into the vein of the groin/neck.  While the catheter is in your neck/groin vein, a  Biopsy forceps  or pincers at the end of the catheter are used to take the tissue samples. This is may be repeated to get enough samples. The biopsy pieces are very small (one to two millimeters).    Patient also understands risks and complications of the procedure which include, but are not limited to bruising/swelling around the incision site, infection, bleeding, allergic reaction to local anesthetic, air embolism, arterial puncture, stroke, heart attack.      Patient verbalized understanding of risks and benefits of the endomyocardial biopsy and has elected to proceed with the procedure.       Brianna MARTIN, CNP  [facility] Interventional Cardiology  [phone]

## 2019-10-28 NOTE — LETTER
10/28/2019      RE: Murray Nicholson  [address]  [place] [address]       Dear Colleague,    Thank you for the opportunity to participate in the care of your patient, Murray Nicholson, at the [facility] HEART [facility]. Please see a copy of my visit note below.    Cardiology Clinic Note     Yahir Turcios MD    [facility]    [address] [place]  [address]       Ana Luisa Mcpherson MD    [facility]    [address] [place] [address], [facility] [address]    [place]  [address]       RE: Murray Nicholson   MRN: [id number]   : 1955      Dear Dr. Turcios and Dr. Mcpherson:      We had the pleasure of seeing Mr. Murray Nicholson for followup in our Cardiac Transplant Clinic at the [facility].  As you know, he is a very pleasant 64-year-old male who underwent orthotopic heart transplantation on 2018.  He is currently listed for kidney transplantation.  He had a very complex postoperative course.  His current problem list includes:     1.  Status post orthotopic heart transplantation.   2.  Neutropenia.   3.  Oral mucosal ulcer secondary to neutropenia with Klebsiella infection.   4.  Pseudomonas bacteremia, resolved.   5.  Perforation of the small bowel, status post small-bowel resection and ileostomy.   6.  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   7.  Hypertension.   8.  Hyperlipidemia.   9.  End-stage renal disease requiring hemodialysis.     Patient returns for a follow up visit. He has no major issues at this time. He denies headaches, blurry vision, chest pain, shortness of breath, abdominal pain, diarrhea, and LE edema. He still undergoes dialysis three times a week.     Of note, he recently saw a dermatologist for a buttocks wound that was positive for HSV. Patient was started on Valtrex. However, he developed hallucinations on the medication. He stopped the medication per Dermatology recommendations and hallucinations  "stopped.    No current facility-administered medications for this visit.      No current outpatient medications on file.     Facility-Administered Medications Ordered in Other Visits   Medication     diatrizoate meglumine-sodium (GASTROGRAFIN/GASTROVIEW) 66-10 % solution 480 mL        REVIEW OF SYSTEMS:  A detailed 10-point review of systems was obtained as described in History of Present Illness.  All other systems are reviewed and are negative.      PHYSICAL EXAMINATION:   GENERAL:  He was comfortable.  He was in no apparent distress.   /74   Pulse (!) 38   Ht 1.72 m (5' 7.72\")   Wt 71.4 kg (157 lb 6.5 oz)   SpO2 100%   BMI 24.13 kg/m     General: No acute distress   HEENT: NC/AT   CV: RRR, +S1/S2, No murmurs, rubs, gallops. JVP is not elevated.    Pulm: Unlabored breathing, CTAB   GI: s/nt/nd   Ext: No edema   Neuro: No focal defects   Psych: Normal Affect      Testing:     Echo (06/2019):  Normal biventricular function, chamber size, wall motion, and wall  thickness.The EF is 65-70%.  Normal RV size and function.  No hemodynamically significant valvular anomalies.  The inferior vena cava was normal in size with preserved respiratory  variability. Estimated mean right atrial pressure is 3 mmHg (normal).  No pericardial effusion is present.     This study was compared with the study from 12/3/2018. There has been no  Change.    RHC (06/2018):   RA 9  RV 38/9  PA 35/16 (24)  PCWP 14  PA % 71.5%  CO/CI 9.3/4.99  PVR 1.07    Coronary Angiogram (06/2019):  Left Anterior Descending   Prox LAD lesion is 40% stenosed.   First Diagonal Branch   The vessel is small.   Second Diagonal Branch   The vessel is moderate in size.   Third Diagonal Branch   The vessel is small.   Left Circumflex   First Obtuse Marginal Branch   The vessel is moderate in size.   1st Mrg lesion is 50% stenosed.   Second Obtuse Marginal Branch   The vessel is moderate in size.   2nd Mrg-1 lesion is 80% stenosed.   2nd Mrg-2 lesion is 80% " stenosed.   Third Obtuse Marginal Branch   The vessel is moderate in size.   Right Coronary Artery   Prox RCA lesion is 20% stenosed.   Dist RCA lesion is 40% stenosed.   Right Posterior Descending Artery   RPDA lesion is 20% stenosed.         Biopsy (10/2019):    FINAL DIAGNOSIS:   Heart Allograft, Endomyocardial Biopsy:    No histological evidence of acute cellular rejection:     -ISHLT 2004 cellular grade: 0R      No histological features diagnostic of antibody-mediated rejection:     -No detectable capillary staining for C4d or C3d (See Comment)     -ISHLT 2013 antibody-mediated grade: pAMR 0     DSA: No (09/2019)     ALLOMAP: 32 (99% NPV)        Lab Results   Component Value Date    SAITESTMET [facility] 09/23/2019    SAICELL Class I 09/23/2019    [id number] None 09/23/2019    [id number] None 09/23/2019    SAIREPCOM  09/23/2019      Test performed by modified procedure. Serum heat inactivated and tested   by a modified ([place]) protocol including fetal calf serum addition.   High-risk, mfi >3,000. Mod-risk, mfi 500-3,000.       Lab Results   Component Value Date    SAIITESTME [facility] 09/23/2019    SAIICELL Class II 09/23/2019    [id number] None 09/23/2019    [id number] None 09/23/2019    SAIIREPCOM  09/23/2019      Test performed by modified procedure. Serum heat inactivated and tested   by a modified ([place]) protocol including fetal calf serum addition.   High-risk, mfi >3,000. Mod-risk, mfi 500-3,000.         IMMUNOSUPPRESSANT LEVELS:  Lab Results   Component Value Date    TACROL 11.8 10/28/2019    DOSTAC Not Provided 10/28/2019       Lab Results   Component Value Date    CSPEC Plasma, EDTA anticoagulant 06/07/2019       CBC RESULTS:   CBC RESULTS:   Recent Labs   Lab Test 06/26/19  0821   WBC 3.0*   RBC 2.27*   HGB 7.1*   HCT 23.2*   *   MCH 31.3   MCHC 30.6*   RDW 18.0*        Recent Labs   Lab Test 06/26/19  0821 06/07/19  0850    135   POTASSIUM 4.1 4.2   CHLORIDE 100 99   CO2 26 27   ANIONGAP  8 8   * 107*   BUN 28 27   CR 5.17* 5.16*   TRINI 8.7 9.1     Liver Function Studies -   Recent Labs   Lab Test 06/07/19  0850   PROTTOTAL 7.0   ALBUMIN 3.4   BILITOTAL 1.1   ALKPHOS 278*   AST 14   ALT 19       Lab Results   Component Value Date    A1C 5.6 09/23/2019     Lab Results   Component Value Date    PHOS 4.4 10/28/2019     PSA   Date Value Ref Range Status   06/07/2019 1.40 0 - 4 ug/L Final     Comment:     Assay Method:  Chemiluminescence using Siemens Vista analyzer       CMV PCR - negative  EBV - Negative.     ASSESSMENT AND PLAN:    Mr. Murray Nicholson is a 64-year-old male status post orthotopic heart transplantation in June with a very complex postoperative course who returns today for followup.     Check CMV   CBC with Diff   CBC in One week  Decrease Cellcept to 500mg BID     #Orthotopic Heart Transplant (06/2018)     Graft Function: Most recent TTE showed normal LV function. Biopsy performed on 10/28/19 showed no evidence of rejection.    CAV PPx: Patient underwent coronary angiogram on 06/2019. At that time, patient had single obstructive coronary artery disease of the OM2 with sequential 80% stenoses. This was unchanged from his prior angiogram a year ago. Continue ASA 81mg and Atorvastatin 40mg daily.     Immunosuppression: Continue Tacrolimus 3/2mg BID and MMF 500mg BID.     Hypertension.  His blood pressure is currently controlled on hydralazine 100 mg 3 times a day, lisinopril 10 mg BID, Norvasc 10 mg, and Clonidine 0.1 mg once a week. . Given his elevated BP, we will stop the clonidine and increase Lisinopril 15mg BID.     #End-stage renal disease.  He continues to remain on dialysis 3 times a week.  He has a bovine AV graft. Given his low magnesium, we will contact renal to see if they can change the bath on dialysis to help increase Mg levels.     #Neutropenia   -Patient's neutrophil count is 900 (WBC is 2.2). Patient has no infectious symptoms at this time. However, given that he is at  high risk for CMV, we will check a CMV level. Will also decrease Cellcept to 500mg BID.     #High risk for CMV.  Will check CMV today at above.     #HSV Ulcer  -Follows up with Dermatology      #Oral ulcer with Klebsiella - Healed.      #Perforated bowel - status post colostomy and reversal of ostomy.     #Diabetes - under better control. Off insulin.     #Anemia - likely related to CKD. His epogen dose was recently increased. Will continue to monitor.      RTC on November 13th to follow up on neutropenia.      Francisco J Bess   Cardiology Fellow   Pager: [phone]    I have personally seen and examined the patient, and then discussed with Dr. Bess, and agree with the findings and plan in this note. I have reviewed today's vital signs, medications, labs, and imaging.     Sincerely,  Klever Ernst MD   Center for Pulmonary Hypertension  Section of Advanced Heart Failure   Cardiovascular Division  [facility]   [phone]

## 2019-10-28 NOTE — PROGRESS NOTES
1225  Returned to  from [facility] per cart accompanied by RN.  S/P Right Heart Cath.  Site at Left internal jugular is FDI.  No hematoma noted.  Denies any pain.  Declines any liquids.  Discharge instructions have already been reviewed with pt.  He has appointments in the clinic this afternoon.  1245  Discharged to care of self per ambulation.  No Sedation medication for this procedure.  CTRN

## 2019-10-28 NOTE — IP AVS SNAPSHOT
[address] [address]                                    After Visit Summary   10/28/2019    Murray Nichloson    MRN: [id number]           After Visit Summary Signature Page    I have received my discharge instructions, and my questions have been answered. I have discussed any challenges I see with this plan with the nurse or doctor.    ..........................................................................................................................................  Patient/Patient Representative Signature      ..........................................................................................................................................  Patient Representative Print Name and Relationship to Patient    ..................................................               ................................................  Date                                   Time    ..........................................................................................................................................  Reviewed by Signature/Title    ...................................................              ..............................................  Date                                               Time          22EPIC Rev 08/18

## 2019-10-28 NOTE — PROGRESS NOTES
1035  Prep is complete for procedure.  Murray is here for heart biopsy.  Denies any pain.  He is here alone.  No sedation for this procedure.  Mag is 1.2, CCL notified.  CTRN   1055  Mag 800mg orally given per order.  CTRN

## 2019-10-28 NOTE — PATIENT INSTRUCTIONS
Please call your coordinator at [phone] with any questions or concerns.    Coordinator will call with biopsy results     Mag low - please have dialysis team adjust    Stop Clonidine; increase Lisinopril to 15 mg twice daily    Decrease Cellcept from 750 mg twice daily to 500 mg twice daily    Follow up with derm re skin infection    Decrease tacrolimus dose from 3 mg twice daily to 3 mg in the AM and 2 mg in the PM      Have CMV drawn today!     Labs at clinic on November 4 at 8 AM, non fasting, 12-hour tacrolimus level      Return to clinic on November 13 at 1030 to see LEWIS Suh

## 2019-10-28 NOTE — Clinical Note
Potential access sites were evaluated for patency using ultrasound.   The left jugular vein was selected. Access was obtained under with Sonosite guidance using a standard 18 guage needle with direct visualization of needle entry.

## 2019-10-28 NOTE — Clinical Note
dry, intact, no bleeding and no hematoma. 7 Fr sheath removed from the LIJ vein. Manual pressure held. Hemostasis obtained. Band aid applied.

## 2019-10-28 NOTE — LETTER
10/28/2019      RE: Murray Nicholson  [address]  [place] [address]       Cardiology Clinic Note     Yahir Turcios MD    [facility]    [address] [place]  [address]       Ana Luisa Mcpherson MD    [facility]    [address] [facility], [facility] [address]    [place]  [address]       RE: Murray Nicholson   MRN: [id number]   : 1955      Dear Dr. Turcios and Dr. Mcpherson:      We had the pleasure of seeing Mr. Murray Nicholson for followup in our Cardiac Transplant Clinic at the [facility].  As you know, he is a very pleasant 64-year-old male who underwent orthotopic heart transplantation on 2018.  He is currently listed for kidney transplantation.  He had a very complex postoperative course.  His current problem list includes:     1.  Status post orthotopic heart transplantation.   2.  Neutropenia.   3.  Oral mucosal ulcer secondary to neutropenia with Klebsiella infection.   4.  Pseudomonas bacteremia, resolved.   5.  Perforation of the small bowel, status post small-bowel resection and ileostomy.   6.  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   7.  Hypertension.   8.  Hyperlipidemia.   9.  End-stage renal disease requiring hemodialysis.     Patient returns for a follow up visit. He has no major issues at this time. He denies headaches, blurry vision, chest pain, shortness of breath, abdominal pain, diarrhea, and LE edema. He still undergoes dialysis three times a week.     Of note, he recently saw a dermatologist for a buttocks wound that was positive for HSV. Patient was started on Valtrex. However, he developed hallucinations on the medication. He stopped the medication per Dermatology recommendations and hallucinations stopped.    No current facility-administered medications for this visit.      No current outpatient medications on file.     Facility-Administered Medications Ordered in Other Visits   Medication     diatrizoate meglumine-sodium  "(GASTROGRAFIN/GASTROVIEW) 66-10 % solution 480 mL        REVIEW OF SYSTEMS:  A detailed 10-point review of systems was obtained as described in History of Present Illness.  All other systems are reviewed and are negative.      PHYSICAL EXAMINATION:   GENERAL:  He was comfortable.  He was in no apparent distress.   /74   Pulse (!) 38   Ht 1.72 m (5' 7.72\")   Wt 71.4 kg (157 lb 6.5 oz)   SpO2 100%   BMI 24.13 kg/m     General: No acute distress   HEENT: NC/AT   CV: RRR, +S1/S2, No murmurs, rubs, gallops. JVP is not elevated.    Pulm: Unlabored breathing, CTAB   GI: s/nt/nd   Ext: No edema   Neuro: No focal defects   Psych: Normal Affect      Testing:     Echo (06/2019):  Normal biventricular function, chamber size, wall motion, and wall  thickness.The EF is 65-70%.  Normal RV size and function.  No hemodynamically significant valvular anomalies.  The inferior vena cava was normal in size with preserved respiratory  variability. Estimated mean right atrial pressure is 3 mmHg (normal).  No pericardial effusion is present.     This study was compared with the study from 12/3/2018. There has been no  Change.    RHC (06/2018):   RA 9  RV 38/9  PA 35/16 (24)  PCWP 14  PA % 71.5%  CO/CI 9.3/4.99  PVR 1.07    Coronary Angiogram (06/2019):  Left Anterior Descending   Prox LAD lesion is 40% stenosed.   First Diagonal Branch   The vessel is small.   Second Diagonal Branch   The vessel is moderate in size.   Third Diagonal Branch   The vessel is small.   Left Circumflex   First Obtuse Marginal Branch   The vessel is moderate in size.   1st Mrg lesion is 50% stenosed.   Second Obtuse Marginal Branch   The vessel is moderate in size.   2nd Mrg-1 lesion is 80% stenosed.   2nd Mrg-2 lesion is 80% stenosed.   Third Obtuse Marginal Branch   The vessel is moderate in size.   Right Coronary Artery   Prox RCA lesion is 20% stenosed.   Dist RCA lesion is 40% stenosed.   Right Posterior Descending Artery   RPDA lesion is 20% " stenosed.         Biopsy (10/2019):    FINAL DIAGNOSIS:   Heart Allograft, Endomyocardial Biopsy:    No histological evidence of acute cellular rejection:     -ISHLT 2004 cellular grade: 0R      No histological features diagnostic of antibody-mediated rejection:     -No detectable capillary staining for C4d or C3d (See Comment)     -ISHLT 2013 antibody-mediated grade: pAMR 0     DSA: No (09/2019)     ALLOMAP: 32 (99% NPV)        Lab Results   Component Value Date    SAITESTMET [facility] 09/23/2019    SAICELL Class I 09/23/2019    [id number] None 09/23/2019    [id number] None 09/23/2019    SAIREPCOM  09/23/2019      Test performed by modified procedure. Serum heat inactivated and tested   by a modified ([place]) protocol including fetal calf serum addition.   High-risk, mfi >3,000. Mod-risk, mfi 500-3,000.       Lab Results   Component Value Date    SAIITESTME [facility] 09/23/2019    SAIICELL Class II 09/23/2019    [id number] None 09/23/2019    [id number] None 09/23/2019    SAIIREPCOM  09/23/2019      Test performed by modified procedure. Serum heat inactivated and tested   by a modified ([place]) protocol including fetal calf serum addition.   High-risk, mfi >3,000. Mod-risk, mfi 500-3,000.         IMMUNOSUPPRESSANT LEVELS:  Lab Results   Component Value Date    TACROL 11.8 10/28/2019    DOSTAC Not Provided 10/28/2019       Lab Results   Component Value Date    CSPEC Plasma, EDTA anticoagulant 06/07/2019       CBC RESULTS:   CBC RESULTS:   Recent Labs   Lab Test 06/26/19  0821   WBC 3.0*   RBC 2.27*   HGB 7.1*   HCT 23.2*   *   MCH 31.3   MCHC 30.6*   RDW 18.0*        Recent Labs   Lab Test 06/26/19  0821 06/07/19  0850    135   POTASSIUM 4.1 4.2   CHLORIDE 100 99   CO2 26 27   ANIONGAP 8 8   * 107*   BUN 28 27   CR 5.17* 5.16*   TRINI 8.7 9.1     Liver Function Studies -   Recent Labs   Lab Test 06/07/19  0850   PROTTOTAL 7.0   ALBUMIN 3.4   BILITOTAL 1.1   ALKPHOS 278*   AST 14   ALT 19       Lab  Results   Component Value Date    A1C 5.6 09/23/2019     Lab Results   Component Value Date    PHOS 4.4 10/28/2019     PSA   Date Value Ref Range Status   06/07/2019 1.40 0 - 4 ug/L Final     Comment:     Assay Method:  Chemiluminescence using Siemens Vista analyzer       CMV PCR - negative  EBV - Negative.     ASSESSMENT AND PLAN:    Mr. Murray Nicholson is a 64-year-old male status post orthotopic heart transplantation in June with a very complex postoperative course who returns today for followup.     Check CMV   CBC with Diff   CBC in One week  Decrease Cellcept to 500mg BID     #Orthotopic Heart Transplant (06/2018)     Graft Function: Most recent TTE showed normal LV function. Biopsy performed on 10/28/19 showed no evidence of rejection.    CAV PPx: Patient underwent coronary angiogram on 06/2019. At that time, patient had single obstructive coronary artery disease of the OM2 with sequential 80% stenoses. This was unchanged from his prior angiogram a year ago. Continue ASA 81mg and Atorvastatin 40mg daily.     Immunosuppression: Continue Tacrolimus 3/2mg BID and MMF 500mg BID.     Hypertension.  His blood pressure is currently controlled on hydralazine 100 mg 3 times a day, lisinopril 10 mg BID, Norvasc 10 mg, and Clonidine 0.1 mg once a week. . Given his elevated BP, we will stop the clonidine and increase Lisinopril 15mg BID.     #End-stage renal disease.  He continues to remain on dialysis 3 times a week.  He has a bovine AV graft. Given his low magnesium, we will contact renal to see if they can change the bath on dialysis to help increase Mg levels.     #Neutropenia   -Patient's neutrophil count is 900 (WBC is 2.2). Patient has no infectious symptoms at this time. However, given that he is at high risk for CMV, we will check a CMV level. Will also decrease Cellcept to 500mg BID.     #High risk for CMV.  Will check CMV today at above.     #HSV Ulcer  -Follows up with Dermatology      #Oral ulcer with  Klebsiella - Healed.      #Perforated bowel - status post colostomy and reversal of ostomy.     #Diabetes - under better control. Off insulin.     #Anemia - likely related to CKD. His epogen dose was recently increased. Will continue to monitor.      RTC on November 13th to follow up on neutropenia.      Francisco J Bess   Cardiology Fellow   Pager: [phone]    I have personally seen and examined the patient, and then discussed with Dr. Bess, and agree with the findings and plan in this note. I have reviewed today's vital signs, medications, labs, and imaging.     Sincerely,    Klever Ernst MD   Center for Pulmonary Hypertension  Section of Advanced Heart Failure   Cardiovascular Division  [facility]   [phone]             Klever Ernst MD

## 2019-10-28 NOTE — PROGRESS NOTES
Cardiology Clinic Note     Yahir Turcios MD    [facility]    [address] [place]  [address]       Ana Luisa Mcphreson MD    [facility]    [address] [facility] [address]    [place]  [address]       RE: Murray Nicholson   MRN: [id number]   : 1955      Dear Dr. Turcios and Dr. Mcpherson:      We had the pleasure of seeing Mr. Murray Nicholson for followup in our Cardiac Transplant Clinic at the [facility].  As you know, he is a very pleasant 64-year-old male who underwent orthotopic heart transplantation on 2018.  He is currently listed for kidney transplantation.  He had a very complex postoperative course.  His current problem list includes:     1.  Status post orthotopic heart transplantation.   2.  Neutropenia.   3.  Oral mucosal ulcer secondary to neutropenia with Klebsiella infection.   4.  Pseudomonas bacteremia, resolved.   5.  Perforation of the small bowel, status post small-bowel resection and ileostomy.   6.  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   7.  Hypertension.   8.  Hyperlipidemia.   9.  End-stage renal disease requiring hemodialysis.     Patient returns for a follow up visit. He has no major issues at this time. He denies headaches, blurry vision, chest pain, shortness of breath, abdominal pain, diarrhea, and LE edema. He still undergoes dialysis three times a week.     Of note, he recently saw a dermatologist for a buttocks wound that was positive for HSV. Patient was started on Valtrex. However, he developed hallucinations on the medication. He stopped the medication per Dermatology recommendations and hallucinations stopped.    No current facility-administered medications for this visit.      No current outpatient medications on file.     Facility-Administered Medications Ordered in Other Visits   Medication     diatrizoate meglumine-sodium (GASTROGRAFIN/GASTROVIEW) 66-10 % solution 480 mL        REVIEW OF SYSTEMS:  A detailed  "10-point review of systems was obtained as described in History of Present Illness.  All other systems are reviewed and are negative.      PHYSICAL EXAMINATION:   GENERAL:  He was comfortable.  He was in no apparent distress.   /74   Pulse (!) 38   Ht 1.72 m (5' 7.72\")   Wt 71.4 kg (157 lb 6.5 oz)   SpO2 100%   BMI 24.13 kg/m    General: No acute distress   HEENT: NC/AT   CV: RRR, +S1/S2, No murmurs, rubs, gallops. JVP is not elevated.    Pulm: Unlabored breathing, CTAB   GI: s/nt/nd   Ext: No edema   Neuro: No focal defects   Psych: Normal Affect      Testing:     Echo (06/2019):  Normal biventricular function, chamber size, wall motion, and wall  thickness.The EF is 65-70%.  Normal RV size and function.  No hemodynamically significant valvular anomalies.  The inferior vena cava was normal in size with preserved respiratory  variability. Estimated mean right atrial pressure is 3 mmHg (normal).  No pericardial effusion is present.     This study was compared with the study from 12/3/2018. There has been no  Change.    RHC (06/2018):   RA 9  RV 38/9  PA 35/16 (24)  PCWP 14  PA % 71.5%  CO/CI 9.3/4.99  PVR 1.07    Coronary Angiogram (06/2019):  Left Anterior Descending   Prox LAD lesion is 40% stenosed.   First Diagonal Branch   The vessel is small.   Second Diagonal Branch   The vessel is moderate in size.   Third Diagonal Branch   The vessel is small.   Left Circumflex   First Obtuse Marginal Branch   The vessel is moderate in size.   1st Mrg lesion is 50% stenosed.   Second Obtuse Marginal Branch   The vessel is moderate in size.   2nd Mrg-1 lesion is 80% stenosed.   2nd Mrg-2 lesion is 80% stenosed.   Third Obtuse Marginal Branch   The vessel is moderate in size.   Right Coronary Artery   Prox RCA lesion is 20% stenosed.   Dist RCA lesion is 40% stenosed.   Right Posterior Descending Artery   RPDA lesion is 20% stenosed.         Biopsy (10/2019):    FINAL DIAGNOSIS:   Heart Allograft, Endomyocardial " Biopsy:    No histological evidence of acute cellular rejection:     -ISHLT 2004 cellular grade: 0R      No histological features diagnostic of antibody-mediated rejection:     -No detectable capillary staining for C4d or C3d (See Comment)     -ISHLT 2013 antibody-mediated grade: pAMR 0     DSA: No (09/2019)     ALLOMAP: 32 (99% NPV)        Lab Results   Component Value Date    SAITESTMET SA [facility] 09/23/2019    SAICELL Class I 09/23/2019    [id number] None 09/23/2019    [id number] None 09/23/2019    SAIREPCOM  09/23/2019      Test performed by modified procedure. Serum heat inactivated and tested   by a modified ([place]) protocol including fetal calf serum addition.   High-risk, mfi >3,000. Mod-risk, mfi 500-3,000.       Lab Results   Component Value Date    SAIITESTME [facility] 09/23/2019    SAIICELL Class II 09/23/2019    [id number] None 09/23/2019    [id number] None 09/23/2019    SAIIREPCOM  09/23/2019      Test performed by modified procedure. Serum heat inactivated and tested   by a modified ([place]) protocol including fetal calf serum addition.   High-risk, mfi >3,000. Mod-risk, mfi 500-3,000.         IMMUNOSUPPRESSANT LEVELS:  Lab Results   Component Value Date    TACROL 11.8 10/28/2019    DOSTAC Not Provided 10/28/2019       Lab Results   Component Value Date    CSPEC Plasma, EDTA anticoagulant 06/07/2019       CBC RESULTS:   CBC RESULTS:   Recent Labs   Lab Test 06/26/19  0821   WBC 3.0*   RBC 2.27*   HGB 7.1*   HCT 23.2*   *   MCH 31.3   MCHC 30.6*   RDW 18.0*        Recent Labs   Lab Test 06/26/19  0821 06/07/19  0850    135   POTASSIUM 4.1 4.2   CHLORIDE 100 99   CO2 26 27   ANIONGAP 8 8   * 107*   BUN 28 27   CR 5.17* 5.16*   TRINI 8.7 9.1     Liver Function Studies -   Recent Labs   Lab Test 06/07/19  0850   PROTTOTAL 7.0   ALBUMIN 3.4   BILITOTAL 1.1   ALKPHOS 278*   AST 14   ALT 19       Lab Results   Component Value Date    A1C 5.6 09/23/2019     Lab Results   Component Value Date     PHOS 4.4 10/28/2019     PSA   Date Value Ref Range Status   06/07/2019 1.40 0 - 4 ug/L Final     Comment:     Assay Method:  Chemiluminescence using Siemens Vista analyzer       CMV PCR - negative  EBV - Negative.     ASSESSMENT AND PLAN:    Mr. Murray Nicholson is a 64-year-old male status post orthotopic heart transplantation in June with a very complex postoperative course who returns today for followup.     Check CMV   CBC with Diff   CBC in One week  Decrease Cellcept to 500mg BID     #Orthotopic Heart Transplant (06/2018)     Graft Function: Most recent TTE showed normal LV function. Biopsy performed on 10/28/19 showed no evidence of rejection.    CAV PPx: Patient underwent coronary angiogram on 06/2019. At that time, patient had single obstructive coronary artery disease of the OM2 with sequential 80% stenoses. This was unchanged from his prior angiogram a year ago. Continue ASA 81mg and Atorvastatin 40mg daily.     Immunosuppression: Continue Tacrolimus 3/2mg BID and MMF 500mg BID.     Hypertension.  His blood pressure is currently controlled on hydralazine 100 mg 3 times a day, lisinopril 10 mg BID, Norvasc 10 mg, and Clonidine 0.1 mg once a week. . Given his elevated BP, we will stop the clonidine and increase Lisinopril 15mg BID.     #End-stage renal disease.  He continues to remain on dialysis 3 times a week.  He has a bovine AV graft. Given his low magnesium, we will contact renal to see if they can change the bath on dialysis to help increase Mg levels.     #Neutropenia   -Patient's neutrophil count is 900 (WBC is 2.2). Patient has no infectious symptoms at this time. However, given that he is at high risk for CMV, we will check a CMV level. Will also decrease Cellcept to 500mg BID.     #High risk for CMV.  Will check CMV today at above.     #HSV Ulcer  -Follows up with Dermatology      #Oral ulcer with Klebsiella - Healed.      #Perforated bowel - status post colostomy and reversal of ostomy.      #Diabetes - under better control. Off insulin.     #Anemia - likely related to CKD. His epogen dose was recently increased. Will continue to monitor.      RTC on November 13th to follow up on neutropenia.      Francisco J Bess   Cardiology Fellow   Pager: [phone]    I have personally seen and examined the patient, and then discussed with Dr. Bess, and agree with the findings and plan in this note. I have reviewed today's vital signs, medications, labs, and imaging.     Sincerely,    Klever Ernst MD   [facility]  Section of Advanced Heart Failure   Cardiovascular Division  [facility]   [phone]

## 2019-10-28 NOTE — TELEPHONE ENCOUNTER
Patient stated he was hallucinating after taking the medication for 3 days and discontinued is after that. Those symptoms decreased and have resolved at this point. Patient is wondering if he needs to take the medication at a smaller dose.    Valentin Perez, [facility]

## 2019-10-29 LAB — COPATH REPORT: NORMAL

## 2019-10-29 NOTE — TELEPHONE ENCOUNTER
Darvin Leos MD [place], Kayrene, [facility]   Caller: Unspecified (Yesterday,  1:11 PM)             Yes, he can take 500 mg (1 tablet) every 48 hours.     Thanks,   Ilya

## 2019-10-29 NOTE — TELEPHONE ENCOUNTER
Patient notified of dose change. Verbal understanding. He will lets us know if he has any concerns.    Valentin Perez, [facility]

## 2019-10-30 LAB
ALLOMAP SCORE (EXTERNAL): 32
NEGATIVE PREDICTIVE VALUE PERCENT (EXTERNAL): 99 %
POSITIVE PREDICTIVE VALUE PERCENT (EXTERNAL): 2.9 %

## 2019-11-02 ENCOUNTER — HEALTH MAINTENANCE LETTER (OUTPATIENT)
Age: 64
End: 2019-11-02

## 2019-11-04 DIAGNOSIS — Z94.1 HEART REPLACED BY TRANSPLANT (H): ICD-10-CM

## 2019-11-04 DIAGNOSIS — T82.9XXA COMPLICATION OF VASCULAR ACCESS FOR DIALYSIS, INITIAL ENCOUNTER: ICD-10-CM

## 2019-11-04 DIAGNOSIS — N18.6 ESRD ON DIALYSIS (H): Primary | ICD-10-CM

## 2019-11-04 DIAGNOSIS — Z99.2 ESRD ON DIALYSIS (H): Primary | ICD-10-CM

## 2019-11-04 DIAGNOSIS — T82.898A OTHER SPECIFIED COMPLICATION OF VASCULAR PROSTHETIC DEVICES, IMPLANTS AND GRAFTS, INITIAL ENCOUNTER (H): ICD-10-CM

## 2019-11-04 LAB
ANION GAP SERPL CALCULATED.3IONS-SCNC: 10 MMOL/L (ref 3–14)
BUN SERPL-MCNC: 51 MG/DL (ref 7–30)
CALCIUM SERPL-MCNC: 8.3 MG/DL (ref 8.5–10.1)
CHLORIDE SERPL-SCNC: 105 MMOL/L (ref 94–109)
CO2 SERPL-SCNC: 22 MMOL/L (ref 20–32)
CREAT SERPL-MCNC: 8.23 MG/DL (ref 0.66–1.25)
ERYTHROCYTE [DISTWIDTH] IN BLOOD BY AUTOMATED COUNT: 16.1 % (ref 10–15)
GFR SERPL CREATININE-BSD FRML MDRD: 6 ML/MIN/{1.73_M2}
GLUCOSE SERPL-MCNC: 165 MG/DL (ref 70–99)
HCT VFR BLD AUTO: 31.4 % (ref 40–53)
HGB BLD-MCNC: 10 G/DL (ref 13.3–17.7)
MAGNESIUM SERPL-MCNC: 1.3 MG/DL (ref 1.6–2.3)
MCH RBC QN AUTO: 32.4 PG (ref 26.5–33)
MCHC RBC AUTO-ENTMCNC: 31.8 G/DL (ref 31.5–36.5)
MCV RBC AUTO: 102 FL (ref 78–100)
PHOSPHATE SERPL-MCNC: 3.5 MG/DL (ref 2.5–4.5)
PLATELET # BLD AUTO: 191 10E9/L (ref 150–450)
POTASSIUM SERPL-SCNC: 4.3 MMOL/L (ref 3.4–5.3)
RBC # BLD AUTO: 3.09 10E12/L (ref 4.4–5.9)
SODIUM SERPL-SCNC: 137 MMOL/L (ref 133–144)
TACROLIMUS BLD-MCNC: 10.4 UG/L (ref 5–15)
TME LAST DOSE: NORMAL H
WBC # BLD AUTO: 2.5 10E9/L (ref 4–11)

## 2019-11-04 PROCEDURE — 80197 ASSAY OF TACROLIMUS: CPT | Performed by: INTERNAL MEDICINE

## 2019-11-05 ENCOUNTER — OFFICE VISIT (OUTPATIENT)
Dept: FAMILY MEDICINE | Facility: CLINIC | Age: 64
End: 2019-11-05
Payer: MEDICARE

## 2019-11-05 VITALS
RESPIRATION RATE: 14 BRPM | OXYGEN SATURATION: 99 % | HEART RATE: 88 BPM | SYSTOLIC BLOOD PRESSURE: 120 MMHG | TEMPERATURE: 97.7 F | DIASTOLIC BLOOD PRESSURE: 66 MMHG | BODY MASS INDEX: 23.79 KG/M2 | HEIGHT: 68 IN | WEIGHT: 157 LBS

## 2019-11-05 DIAGNOSIS — D63.1 ANEMIA IN STAGE 5 CHRONIC KIDNEY DISEASE (H): ICD-10-CM

## 2019-11-05 DIAGNOSIS — R53.83 FATIGUE, UNSPECIFIED TYPE: ICD-10-CM

## 2019-11-05 DIAGNOSIS — E11.22 TYPE 2 DIABETES MELLITUS WITH CHRONIC KIDNEY DISEASE ON CHRONIC DIALYSIS, WITHOUT LONG-TERM CURRENT USE OF INSULIN (H): Primary | ICD-10-CM

## 2019-11-05 DIAGNOSIS — Z99.2 TYPE 2 DIABETES MELLITUS WITH CHRONIC KIDNEY DISEASE ON CHRONIC DIALYSIS, WITHOUT LONG-TERM CURRENT USE OF INSULIN (H): Primary | ICD-10-CM

## 2019-11-05 DIAGNOSIS — N18.5 ANEMIA IN STAGE 5 CHRONIC KIDNEY DISEASE (H): ICD-10-CM

## 2019-11-05 DIAGNOSIS — Z94.1 HEART TRANSPLANTED (H): ICD-10-CM

## 2019-11-05 DIAGNOSIS — R26.89 IMBALANCE: ICD-10-CM

## 2019-11-05 DIAGNOSIS — D84.9 IMMUNOSUPPRESSION (H): ICD-10-CM

## 2019-11-05 DIAGNOSIS — N18.6 TYPE 2 DIABETES MELLITUS WITH CHRONIC KIDNEY DISEASE ON CHRONIC DIALYSIS, WITHOUT LONG-TERM CURRENT USE OF INSULIN (H): Primary | ICD-10-CM

## 2019-11-05 PROCEDURE — 99213 OFFICE O/P EST LOW 20 MIN: CPT | Performed by: FAMILY MEDICINE

## 2019-11-05 ASSESSMENT — MIFFLIN-ST. JEOR: SCORE: 1472.21

## 2019-11-05 NOTE — PROGRESS NOTES
"Subjective     Murray Nicholson is a 64 year old male who presents to clinic today for the following health issues:    HPI   Forms    He is about 1.5 years out from his heart transplant. He continues ot have dialysis 3 times weekly. He has recnetly been dealing with multiple fingers with loss of full motion and decreased  strength. He is working with sports med and rheum. He had a steroid injection for trigger finger which seems to be helping. He continues to have fatigue on a daily basis worse when around dialysis. He feels imbalanced much of the time but particularly unable to work on ladders.     He has not had formal assessment but feels his strength remains low.     He is on immune suppressant medication and also is dealing with a nonhealing sore on his bottom.     Reviewed and updated as needed this visit by Provider        med hx, family hx, and soc hx reviewed and updated   Review of Systems         Objective    /66   Pulse 88   Temp 97.7  F (36.5  C)   Resp 14   Ht 1.72 m (5' 7.72\")   Wt 71.2 kg (157 lb)   SpO2 99%   BMI 24.07 kg/m    Body mass index is 24.07 kg/m .  Physical Exam   Mental status exam:  Well-groomed, well-dressed, appropriate affect, no evidence of formal thought disorder, No SI, Speech normal.   L clear  cv-RRR  No edema today. (per his report this is better usually after dialysis.         Assessment & Plan     ICD-10-CM    1. Type 2 diabetes mellitus with chronic kidney disease on chronic dialysis, without long-term current use of insulin (H) E11.22     N18.6     Z99.2    2. Immunosuppression (H) D89.9    3. Anemia in stage 5 chronic kidney disease (H) N18.5     D63.1    4. Heart transplanted (H) Z94.1    5. Fatigue, unspecified type R53.83    6. Imbalance R26.89     We went over the forms from guardian question by question. Given we had no formal assessment of residual capacity, our answers represented our best estimates. His imbalance, fatigue, lack of manual dexterity and " strength along with his immunosuppressed state all contribute to decreased ability to function in a work environment as he had in the past. Time of appointment > 15 minutes greater than half in counseling and coordination of care.      Return in about 6 months (around 5/5/2020) for follow up appointment.    Yahir Turcios MD  [facility]

## 2019-11-08 ENCOUNTER — TELEPHONE (OUTPATIENT)
Dept: TRANSPLANT | Facility: CLINIC | Age: 64
End: 2019-11-08

## 2019-11-08 ENCOUNTER — DOCUMENTATION ONLY (OUTPATIENT)
Dept: TRANSPLANT | Facility: CLINIC | Age: 64
End: 2019-11-08

## 2019-11-08 ENCOUNTER — PRE VISIT (OUTPATIENT)
Dept: TRANSPLANT | Facility: CLINIC | Age: 64
End: 2019-11-08

## 2019-11-08 DIAGNOSIS — Z94.1 HEART REPLACED BY TRANSPLANT (H): Primary | ICD-10-CM

## 2019-11-08 DIAGNOSIS — Z94.1 HEART TRANSPLANT RECIPIENT (H): ICD-10-CM

## 2019-11-08 RX ORDER — TACROLIMUS 1 MG/1
CAPSULE ORAL
Qty: 120 CAPSULE | Refills: 11 | Status: ON HOLD | OUTPATIENT
Start: 2019-11-08 | End: 2019-12-22

## 2019-11-08 NOTE — TELEPHONE ENCOUNTER
Level 10.4. Goal 6-8. Dose decreased to 2 mg BID (down from 3/2 mg)   Lab results, med change and next steps reviewed with pt.  Recheck 11/13 per schedule

## 2019-11-08 NOTE — TELEPHONE ENCOUNTER
Murray said he was returning Britni's call; didn't see she reached out to him in the chart. He also thought she had mentioned a certain anti-rejection drug to him he want to talk more about. No clues... Writer will ask coordinator to call him on Monday 11/11/19. Thanks.

## 2019-11-11 ENCOUNTER — TELEPHONE (OUTPATIENT)
Dept: TRANSPLANT | Facility: CLINIC | Age: 64
End: 2019-11-11

## 2019-11-11 NOTE — TELEPHONE ENCOUNTER
"Coordinator called back and left msg (see note 11/8/19). Was not trying to reach pt about an anti-rejection drug. Maybe cards team? If pt has any questions regarding kidney transplant waitlist encouraged him to call back, contact number provided.    Pt called back and thought coordinator had mentioned something about an expensive post transplant anti rejection med that starts with an \"f\" Will ask heart coordinator about it when he sees her in clinic this week.    "

## 2019-11-12 DIAGNOSIS — K29.80 DUODENITIS: ICD-10-CM

## 2019-11-13 ENCOUNTER — OFFICE VISIT (OUTPATIENT)
Dept: CARDIOLOGY | Facility: CLINIC | Age: 64
End: 2019-11-13
Attending: NURSE PRACTITIONER
Payer: MEDICARE

## 2019-11-13 ENCOUNTER — ANCILLARY PROCEDURE (OUTPATIENT)
Dept: ULTRASOUND IMAGING | Facility: CLINIC | Age: 64
End: 2019-11-13
Attending: CLINICAL NURSE SPECIALIST
Payer: MEDICARE

## 2019-11-13 VITALS
DIASTOLIC BLOOD PRESSURE: 70 MMHG | WEIGHT: 156.53 LBS | SYSTOLIC BLOOD PRESSURE: 131 MMHG | BODY MASS INDEX: 23.72 KG/M2 | HEART RATE: 90 BPM | HEIGHT: 68 IN | OXYGEN SATURATION: 98 %

## 2019-11-13 DIAGNOSIS — T82.9XXA COMPLICATION OF VASCULAR ACCESS FOR DIALYSIS, INITIAL ENCOUNTER: ICD-10-CM

## 2019-11-13 DIAGNOSIS — N18.6 ESRD ON DIALYSIS (H): Primary | ICD-10-CM

## 2019-11-13 DIAGNOSIS — Z94.1 HEART REPLACED BY TRANSPLANT (H): Primary | ICD-10-CM

## 2019-11-13 DIAGNOSIS — N18.5 CKD (CHRONIC KIDNEY DISEASE) STAGE 5, GFR LESS THAN 15 ML/MIN (H): ICD-10-CM

## 2019-11-13 DIAGNOSIS — Z99.2 ESRD ON DIALYSIS (H): ICD-10-CM

## 2019-11-13 DIAGNOSIS — D72.819 LEUKOPENIA, UNSPECIFIED TYPE: ICD-10-CM

## 2019-11-13 DIAGNOSIS — T82.898A OTHER SPECIFIED COMPLICATION OF VASCULAR PROSTHETIC DEVICES, IMPLANTS AND GRAFTS, INITIAL ENCOUNTER (H): ICD-10-CM

## 2019-11-13 DIAGNOSIS — I10 ESSENTIAL HYPERTENSION: ICD-10-CM

## 2019-11-13 DIAGNOSIS — K86.2 PANCREATIC CYST: ICD-10-CM

## 2019-11-13 DIAGNOSIS — Z94.1 HEART REPLACED BY TRANSPLANT (H): ICD-10-CM

## 2019-11-13 DIAGNOSIS — B00.9 HSV (HERPES SIMPLEX VIRUS) INFECTION: ICD-10-CM

## 2019-11-13 DIAGNOSIS — Z99.2 ESRD ON DIALYSIS (H): Primary | ICD-10-CM

## 2019-11-13 DIAGNOSIS — N18.6 ESRD ON DIALYSIS (H): ICD-10-CM

## 2019-11-13 DIAGNOSIS — D84.9 IMMUNOSUPPRESSION (H): ICD-10-CM

## 2019-11-13 LAB
ANION GAP SERPL CALCULATED.3IONS-SCNC: 8 MMOL/L (ref 3–14)
BUN SERPL-MCNC: 40 MG/DL (ref 7–30)
CALCIUM SERPL-MCNC: 8.4 MG/DL (ref 8.5–10.1)
CHLORIDE SERPL-SCNC: 103 MMOL/L (ref 94–109)
CO2 SERPL-SCNC: 26 MMOL/L (ref 20–32)
CREAT SERPL-MCNC: 7.48 MG/DL (ref 0.66–1.25)
ERYTHROCYTE [DISTWIDTH] IN BLOOD BY AUTOMATED COUNT: 17.4 % (ref 10–15)
GFR SERPL CREATININE-BSD FRML MDRD: 7 ML/MIN/{1.73_M2}
GLUCOSE SERPL-MCNC: 174 MG/DL (ref 70–99)
HCT VFR BLD AUTO: 31.5 % (ref 40–53)
HGB BLD-MCNC: 9.9 G/DL (ref 13.3–17.7)
MAGNESIUM SERPL-MCNC: 1.4 MG/DL (ref 1.6–2.3)
MCH RBC QN AUTO: 32.4 PG (ref 26.5–33)
MCHC RBC AUTO-ENTMCNC: 31.4 G/DL (ref 31.5–36.5)
MCV RBC AUTO: 103 FL (ref 78–100)
PHOSPHATE SERPL-MCNC: 2.8 MG/DL (ref 2.5–4.5)
PLATELET # BLD AUTO: 228 10E9/L (ref 150–450)
POTASSIUM SERPL-SCNC: 4.5 MMOL/L (ref 3.4–5.3)
RADIOLOGIST FLAGS: ABNORMAL
RBC # BLD AUTO: 3.06 10E12/L (ref 4.4–5.9)
SODIUM SERPL-SCNC: 137 MMOL/L (ref 133–144)
TACROLIMUS BLD-MCNC: 6.5 UG/L (ref 5–15)
TME LAST DOSE: NORMAL H
WBC # BLD AUTO: 2.6 10E9/L (ref 4–11)

## 2019-11-13 PROCEDURE — 84100 ASSAY OF PHOSPHORUS: CPT | Performed by: NURSE PRACTITIONER

## 2019-11-13 PROCEDURE — 99214 OFFICE O/P EST MOD 30 MIN: CPT | Mod: ZP | Performed by: NURSE PRACTITIONER

## 2019-11-13 PROCEDURE — 36415 COLL VENOUS BLD VENIPUNCTURE: CPT | Performed by: NURSE PRACTITIONER

## 2019-11-13 PROCEDURE — 83735 ASSAY OF MAGNESIUM: CPT | Performed by: NURSE PRACTITIONER

## 2019-11-13 PROCEDURE — G0463 HOSPITAL OUTPT CLINIC VISIT: HCPCS | Mod: ZF

## 2019-11-13 PROCEDURE — 80048 BASIC METABOLIC PNL TOTAL CA: CPT | Performed by: NURSE PRACTITIONER

## 2019-11-13 PROCEDURE — 80197 ASSAY OF TACROLIMUS: CPT | Performed by: NURSE PRACTITIONER

## 2019-11-13 PROCEDURE — 85027 COMPLETE CBC AUTOMATED: CPT | Performed by: NURSE PRACTITIONER

## 2019-11-13 RX ORDER — PANTOPRAZOLE SODIUM 40 MG/1
40 TABLET, DELAYED RELEASE ORAL
Qty: 60 TABLET | Refills: 11 | Status: ON HOLD | OUTPATIENT
Start: 2019-11-13 | End: 2019-12-22

## 2019-11-13 ASSESSMENT — PAIN SCALES - GENERAL: PAINLEVEL: NO PAIN (0)

## 2019-11-13 ASSESSMENT — MIFFLIN-ST. JEOR: SCORE: 1474.5

## 2019-11-13 NOTE — PATIENT INSTRUCTIONS
Please call your coordinator at [phone] with any questions or concerns.      Lillie will call with tacrolimus level and any med changes    Send BP update via My Chart. Please call if consistently greater than 140/90    Please follow up with derm re how long to take the Valtrex    See you in Feb/March for 1 yr 8 month follow up.   will call you to arrange    Please call if you develop any resp symptoms/flu, please call or follow up with primary

## 2019-11-13 NOTE — NURSING NOTE
Chief Complaint   Patient presents with     Follow Up     ump return heart transplant follow up      Vitals were taken and medications were reconciled.    Genie Price  10:15 AM

## 2019-11-13 NOTE — PROGRESS NOTES
[facility]    HPI:   Mr. Nicholson is a 64 year old male s/p recent orthotopic heart transplant who presents to clinic today for routine follow-up. Patient has history of systolic heart failure 2/2 NICM, CKD on HD, HTN, dyslipidemia, DM2. He was admitted 4/16/2018 for expediated workup for LVAD/heart/kidney transplant and was started on inotropes. He was listed for both heart/kidney transplant but ultimately underwent solo orthotopic heart transplant on 6/14/18. He had a very complex postoperative course. His current problem list includes:      1.  Status post orthotopic heart transplantation.   2.  Neutropenia.   3.  Oral mucosal ulcer secondary to neutropenia with Klebsiella infection.   4.  Pseudomonas bacteremia, resolved.   5.  Perforation of the small bowel, status post small-bowel resection and ileostomy.   6.  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   7.  Hypertension.   8.  Hyperlipidemia.   9.  End-stage renal disease requiring hemodialysis currently listed for renal transplant.   10. Prior RIJ thrombus infected with CoNS.  11. Donor coronary artery disease.     Biospies have been negative for rejection. Last RHC showed normal cardiac output and filling pressures, last echo with normal graft function. Baseline angiogram w/ donor coronary disease in all three coronaries arteries including 40% prox-mLAD lesion, 50% OM1, 80% OM2 lesion, and 20-40% RCA disease. He was recently seen by Dr Ernst - clonidine was dc'ed and lisinopril increased. Presents today for f/u.     Since last visit, patient has no concerns. He forgot BP log at home but states overall BP improved to 120-140 systolic. He admittedly finds it hard to remember the midday hydralazine. He is still taking Valtrex for HSV infection per dermatology. Dose was decreased due to hallucination. He does not know the duration. He thinks rash is gone but isn't sure - no burning or tingling. Started on oral Mg by nephrology recently. Patient otherwise  denies fever, chills, new rashes, mouth sores, lightheadedness, nausea, vomiting, diarrhea.     PAST MEDICAL HISTORY:  Past Medical History:   Diagnosis Date     (HFpEF) heart failure with preserved ejection fraction (H)      Allergic rhinitis, cause unspecified      Anemia of chronic kidney failure      AS (aortic stenosis)      Ascending aortic aneurysm (H)      Bicuspid aortic valve      CAD (coronary artery disease)      Congestive heart failure, unspecified      Dialysis patient (H)     [facility]     Dyslipidemia      Esophageal reflux      ESRD (end stage renal disease) (H)      Hearing problem      Heart replaced by transplant (H) 12/10/2018     Hypersomnia with sleep apnea, unspecified      Hypertension      Ileostomy status (H)      Immunosuppression (H)      MGUS (monoclonal gammopathy of unknown significance)      Mitral regurgitation      SHEELA (obstructive sleep apnea)     No CPAP     Pneumonia      Systolic heart failure (H)      Type 2 diabetes mellitus (H)        FAMILY HISTORY:  Family History   Problem Relation Age of Onset     C.A.D. Father          from-never knew father-age 60     Diabetes Father      Cerebrovascular Disease Father      Hypertension Father      Hypertension No family hx of      Breast Cancer No family hx of      Cancer - colorectal No family hx of      Prostate Cancer No family hx of      Kidney Disease No family hx of      Melanoma No family hx of      Skin Cancer No family hx of        SOCIAL HISTORY:  , not currently working    CURRENT MEDICATIONS:  acetaminophen (TYLENOL) 500 MG tablet, Take 2 tablets (1,000 mg) by mouth 4 times daily (Patient taking differently: Take 1,000 mg by mouth every 6 hours as needed )  amLODIPine (NORVASC) 10 MG tablet, Take 1 tablet (10 mg) by mouth daily  aspirin 81 MG EC tablet, Take 81 mg by mouth daily  atorvastatin (LIPITOR) 40 MG tablet, Take 1 tablet (40 mg) by mouth daily  biotin (BIOTIN 5000) 5 MG CAPS, Take 5 mg by mouth  "daily  fluticasone (FLONASE) 50 MCG/ACT nasal spray, Spray 1 spray into both nostrils daily (Patient taking differently: Spray 1 spray into both nostrils daily as needed )  hydrALAZINE (APRESOLINE) 100 MG TABS tablet, Take 1 tablet (100 mg) by mouth every 8 hours  lidocaine-prilocaine (EMLA) 2.5-2.5 % external cream, apply 1 hour prior to dialysis  lisinopril (PRINIVIL/ZESTRIL) 10 MG tablet, Take 1.5 tablets (15 mg) by mouth 2 times daily  loratadine (CLARITIN) 10 MG tablet, Take 10 mg by mouth daily as needed Reported on 5/3/2017  melatonin 1 MG TABS tablet, Take 2 tablets (2 mg) by mouth nightly as needed for sleep  mupirocin (BACTROBAN) 2 % external ointment, Apply topically 3 times daily  mycophenolate (GENERIC EQUIVALENT) 250 MG capsule, Take 2 capsules (500 mg) by mouth 2 times daily  NEPHROCAPS 1 MG capsule, Take 1 capsule by mouth daily  pantoprazole (PROTONIX) 40 MG EC tablet, Take 1 tablet (40 mg) by mouth 2 times daily (before meals)  tacrolimus (GENERIC EQUIVALENT) 1 MG capsule, Take two capsules in the AM (2 mg) and two capsules in the PM (2 mg)  valACYclovir (VALTREX) 1000 mg tablet, Take 1 tablet (1,000 mg) by mouth 2 times daily for 10 days    diatrizoate meglumine-sodium (GASTROGRAFIN/GASTROVIEW) 66-10 % solution 480 mL  lidocaine (PF) (XYLOCAINE) 1 % injection 1 mL        ROS:  CONSTITUTIONAL: See HPI  HEENT: Denies HA  CV: See HPI  PULMONARY: See HPI  GI: See HPI  : Denies urinary alterations, dysuria, urinary frequency, hematuria, and abnormal drainage.   EXT: Denies lower extremity edema or color changes.   SKIN: Denies abnormal rashes or lesions.   MUSCULOSKELETAL: Denies upper or lower extremity weakness and pain.   NEUROLOGIC: Denies lightheadedness, dizziness, seizures, or upper or lower extremity paresthesia.     EXAM:  /70 (BP Location: Right arm, Patient Position: Chair, Cuff Size: Adult Regular)   Pulse 90   Ht 1.727 m (5' 8\")   Wt 71 kg (156 lb 8.4 oz)   SpO2 98%   BMI " 23.80 kg/m       GENERAL: Appears comfortable, in no acute distress.   HEENT: Eye symmetrical, no discharge or icterus bilaterally. Mucous membranes moist.   CV: RRR, +S1S2, no murmur, rub, or gallop. JVP 2cm above clavicle at 45 degrees.   RESPIRATORY: Respirations regular, even, and unlabored. Lungs CTA throughout.   GI: Soft and non distended with normoactive bowel sounds present in all quadrants. No tenderness, rebound, guarding. No hepatomegaly.   EXTREMITIES: No peripheral edema. 2+ bilateral pedal pulses.   NEUROLOGIC: Alert and oriented x 3. No focal deficits.   MUSCULOSKELETAL: No joint swelling or tenderness.   SKIN: No jaundice. No rashes or lesions.     Labs - reviewed with patient in clinic today:  CBC RESULTS:  Lab Results   Component Value Date    WBC 2.6 (L) 11/13/2019    RBC 3.06 (L) 11/13/2019    HGB 9.9 (L) 11/13/2019    HCT 31.5 (L) 11/13/2019     (H) 11/13/2019    MCH 32.4 11/13/2019    MCHC 31.4 (L) 11/13/2019    RDW 17.4 (H) 11/13/2019     11/13/2019       CMP RESULTS:  Lab Results   Component Value Date     11/13/2019    POTASSIUM 4.5 11/13/2019    CHLORIDE 103 11/13/2019    CO2 26 11/13/2019    ANIONGAP 8 11/13/2019     (H) 11/13/2019    BUN 40 (H) 11/13/2019    CR 7.48 (H) 11/13/2019    GFRESTIMATED 7 (L) 11/13/2019    GFRESTBLACK 8 (L) 11/13/2019    TRINI 8.4 (L) 11/13/2019    BILITOTAL 0.7 10/28/2019    ALBUMIN 3.8 10/28/2019    ALKPHOS 318 (H) 10/28/2019    ALT 14 10/28/2019    AST 11 10/28/2019        INR RESULTS:  Lab Results   Component Value Date    INR 1.13 09/25/2019       LIPID RESULTS:  Lab Results   Component Value Date    CHOL 120 10/28/2019    HDL 58 10/28/2019    LDL 37 10/28/2019    TRIG 130 10/28/2019    CHOLHDLRATIO 5.0 08/10/2015       IMMUNOSUPPRESSANT LEVELS:  Lab Results   Component Value Date    TACROL 10.4 11/04/2019    DOSTAC 8:30PM 11 11/04/2019       No components found for: CK  Lab Results   Component Value Date    MAG 1.4 (L) 11/13/2019      Lab Results   Component Value Date    A1C 5.6 09/23/2019     Lab Results   Component Value Date    PHOS 3.5 11/04/2019     Lab Results   Component Value Date    NTBNP 15,996 (H) 11/08/2016     Lab Results   Component Value Date    SAITESTMET [facility] 09/23/2019    SAICELL Class I 09/23/2019    [id number] None 09/23/2019    [id number] None 09/23/2019    SAIREPCOM  09/23/2019      Test performed by modified procedure. Serum heat inactivated and tested   by a modified (Medon) protocol including fetal calf serum addition.   High-risk, mfi >3,000. Mod-risk, mfi 500-3,000.       Lab Results   Component Value Date    SAIITESTME [facility] 09/23/2019    SAIICELL Class II 09/23/2019    [id number] None 09/23/2019    [id number] None 09/23/2019    SAIIREPCOM  09/23/2019      Test performed by modified procedure. Serum heat inactivated and tested   by a modified (Medon) protocol including fetal calf serum addition.   High-risk, mfi >3,000. Mod-risk, mfi 500-3,000.       Lab Results   Component Value Date    CSPEC EDTA PLASMA 10/28/2019       Diagnostic Studies:  TTE 6/10/19  Normal biventricular function, chamber size, wall motion, and wall  thickness.The EF is 65-70%.  Normal RV size and function.  No hemodynamically significant valvular anomalies.  The inferior vena cava was normal in size with preserved respiratory  variability. Estimated mean right atrial pressure is 3 mmHg (normal).  No pericardial effusion is present.     This study was compared with the study from 12/3/2018. There has been no  change.    Cor angiogram 6/28/19  Left Anterior Descending   Prox LAD lesion is 40% stenosed.   First Diagonal Branch   The vessel is small.   Second Diagonal Branch   The vessel is moderate in size.   Third Diagonal Branch   The vessel is small.   Left Circumflex   First Obtuse Marginal Branch   The vessel is moderate in size.   1st Mrg lesion is 50% stenosed.   Second Obtuse Marginal Branch   The vessel is moderate in size.   2nd  Mrg-1 lesion is 80% stenosed.   2nd Mrg-2 lesion is 80% stenosed.   Third Obtuse Marginal Branch   The vessel is moderate in size.   Right Coronary Artery   Prox RCA lesion is 20% stenosed.   Dist RCA lesion is 40% stenosed.   Right Posterior Descending Artery   RPDA lesion is 20% stenosed.       [facility] 6/28/19  RA 9  PA 35/16(24)  PCWP 14  Td CO/CI 9.4/5    Assessment/Plan:  Mr. Nicholson is a 64 year old male who is s/p orthotopic heart transplant on 6/14/18 who presents to clinic for follow-up.. Post-op course was complicated leukocytosis for which he received abx, RIJ thrombus infected with CoNS, incidental pneumoperitoneum, necrotic mouth sore with klebsiella and pseudomonas bacteremia, and perforation of the small bowel, status post small-bowel resection and ileostomy. He remains on HD for ESRD. From a cardiac standpoint, he has been stable with normal graft function, filling pressures, and hemodynamics. He has no DSAs. Does have donor CAD. Ongoing leukopenia but improved on lower MMF, still on Valtrex.     # Status post OHT on 6/14/18, history of NICM  # Donor 3 vessel CAD  # Chronic immunosuppression  * Rejection history: none.   * Recent immunosuppression changes: MMF decreased due to leukopenia  * Last biopsy: 10/28/19 negative  * Intolerance to medications: none    Immunosuppression:  - Tacrolimus trough level goal 6-8  - Mycophenolate 500 mg bid     Prophylaxis:  - PPI: pantoprazole 40 mg BID unclear why BID, hx of duodenitis - will investigate if this can be d/c'ed as this will help hypoMg   - CAV/CAD: ASA 81 mg and atorvastatin 40 mg (Crestor cost prohibitive), per last angiogram may benefit from lifelong dapt, will defer to Dr Ernst; recommend exercise, a1c 5.6  - CMV high risk D+ R-: Valcyte d/c'ed due to leukopenia previously, low threshold to recheck CMV PCR    # HTN. ?controlled, goal ?<130/80, he will send us BP log, for now continue lisinopril 15 mg bid, hydralazine 100 mg tid, amlodipine 10 mg  "daily. Could consider increasing lisinopril 20 mg bid and decreasing hydralazine in the future.   # Leukopenia. Improving with decreased MMF . CMV PCR negative last month.   # HSV on buttocks. Still on Valtrex. Patient to clarify duration with derm, stopping Valtrex should help leukopenia.    Chronic issues:  # Hx perforated bowel s/p colostomy, resolved  # Hx klebsiella mouth ulcer, resolved  # Hx pseudomonas bacteremia, resolved   # ESRD listed for renal transplant, on HD TTS  # Pancreatic cyst surveillance. Last MRI 4/2019 showed \"numerous pancreatic cystic foci are similar to those seen on 4/19/2018 with minimal increase in size of one of these cystic foci arising from the pancreatic body (now measuring 14 mm, previously 13  mm). These remain most consistent with side branch intraductal papillary mucinous neoplasms. No suspicious features on this unenhanced exam.\" Abdominal MRI q1-2 years per GI.       25 minutes spent face-to-face with patient, >50% in counseling and/or coordination of care as described above      Ramya Suh, FRANK, NP-C  11/13/2019                  "

## 2019-11-13 NOTE — LETTER
11/13/2019      RE: Murray Nicholson  [address]  [place] [address]       Dear Colleague,    Thank you for the opportunity to participate in the care of your patient, Murray Nicholson, at the [facility] at [facility]. Please see a copy of my visit note below.    [facility]    HPI:   Mr. Nicholson is a 64 year old male s/p recent orthotopic heart transplant who presents to clinic today for routine follow-up. Patient has history of systolic heart failure 2/2 NICM, CKD on HD, HTN, dyslipidemia, DM2. He was admitted 4/16/2018 for expediated workup for LVAD/heart/kidney transplant and was started on inotropes. He was listed for both heart/kidney transplant but ultimately underwent solo orthotopic heart transplant on 6/14/18. He had a very complex postoperative course. His current problem list includes:      1.  Status post orthotopic heart transplantation.   2.  Neutropenia.   3.  Oral mucosal ulcer secondary to neutropenia with Klebsiella infection.   4.  Pseudomonas bacteremia, resolved.   5.  Perforation of the small bowel, status post small-bowel resection and ileostomy.   6.  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   7.  Hypertension.   8.  Hyperlipidemia.   9.  End-stage renal disease requiring hemodialysis currently listed for renal transplant.   10. Prior RIJ thrombus infected with CoNS.  11. Donor coronary artery disease.     Biospies have been negative for rejection. Last RHC showed normal cardiac output and filling pressures, last echo with normal graft function. Baseline angiogram w/ donor coronary disease in all three coronaries arteries including 40% prox-mLAD lesion, 50% OM1, 80% OM2 lesion, and 20-40% RCA disease. He was recently seen by Dr Ernst - clonidine was KS'ed and lisinopril increased. Presents today for f/u.     Since last visit, patient has no concerns. He forgot BP log at home but states overall BP improved to 120-140 systolic. He  admittedly finds it hard to remember the midday hydralazine. He is still taking Valtrex for HSV infection per dermatology. Dose was decreased due to hallucination. He does not know the duration. He thinks rash is gone but isn't sure - no burning or tingling. Started on oral Mg by nephrology recently. Patient otherwise denies fever, chills, new rashes, mouth sores, lightheadedness, nausea, vomiting, diarrhea.     PAST MEDICAL HISTORY:  Past Medical History:   Diagnosis Date     (HFpEF) heart failure with preserved ejection fraction (H)      Allergic rhinitis, cause unspecified      Anemia of chronic kidney failure      AS (aortic stenosis)      Ascending aortic aneurysm (H)      Bicuspid aortic valve      CAD (coronary artery disease)      Congestive heart failure, unspecified      Dialysis patient (H)     Tues-Thur-Sat     Dyslipidemia      Esophageal reflux      ESRD (end stage renal disease) (H)      Hearing problem      Heart replaced by transplant (H) 12/10/2018     Hypersomnia with sleep apnea, unspecified      Hypertension      Ileostomy status (H)      Immunosuppression (H)      MGUS (monoclonal gammopathy of unknown significance)      Mitral regurgitation      SHEELA (obstructive sleep apnea)     No CPAP     Pneumonia      Systolic heart failure (H)      Type 2 diabetes mellitus (H)        FAMILY HISTORY:  Family History   Problem Relation Age of Onset     C.A.D. Father          from-never knew father-age 60     Diabetes Father      Cerebrovascular Disease Father      Hypertension Father      Hypertension No family hx of      Breast Cancer No family hx of      Cancer - colorectal No family hx of      Prostate Cancer No family hx of      Kidney Disease No family hx of      Melanoma No family hx of      Skin Cancer No family hx of        SOCIAL HISTORY:  , not currently working    CURRENT MEDICATIONS:  acetaminophen (TYLENOL) 500 MG tablet, Take 2 tablets (1,000 mg) by mouth 4 times daily (Patient  taking differently: Take 1,000 mg by mouth every 6 hours as needed )  amLODIPine (NORVASC) 10 MG tablet, Take 1 tablet (10 mg) by mouth daily  aspirin 81 MG EC tablet, Take 81 mg by mouth daily  atorvastatin (LIPITOR) 40 MG tablet, Take 1 tablet (40 mg) by mouth daily  biotin (BIOTIN 5000) 5 MG CAPS, Take 5 mg by mouth daily  fluticasone (FLONASE) 50 MCG/ACT nasal spray, Spray 1 spray into both nostrils daily (Patient taking differently: Spray 1 spray into both nostrils daily as needed )  hydrALAZINE (APRESOLINE) 100 MG TABS tablet, Take 1 tablet (100 mg) by mouth every 8 hours  lidocaine-prilocaine (EMLA) 2.5-2.5 % external cream, apply 1 hour prior to dialysis  lisinopril (PRINIVIL/ZESTRIL) 10 MG tablet, Take 1.5 tablets (15 mg) by mouth 2 times daily  loratadine (CLARITIN) 10 MG tablet, Take 10 mg by mouth daily as needed Reported on 5/3/2017  melatonin 1 MG TABS tablet, Take 2 tablets (2 mg) by mouth nightly as needed for sleep  mupirocin (BACTROBAN) 2 % external ointment, Apply topically 3 times daily  mycophenolate (GENERIC EQUIVALENT) 250 MG capsule, Take 2 capsules (500 mg) by mouth 2 times daily  NEPHROCAPS 1 MG capsule, Take 1 capsule by mouth daily  pantoprazole (PROTONIX) 40 MG EC tablet, Take 1 tablet (40 mg) by mouth 2 times daily (before meals)  tacrolimus (GENERIC EQUIVALENT) 1 MG capsule, Take two capsules in the AM (2 mg) and two capsules in the PM (2 mg)  valACYclovir (VALTREX) 1000 mg tablet, Take 1 tablet (1,000 mg) by mouth 2 times daily for 10 days    diatrizoate meglumine-sodium (GASTROGRAFIN/GASTROVIEW) 66-10 % solution 480 mL  lidocaine (PF) (XYLOCAINE) 1 % injection 1 mL        ROS:  CONSTITUTIONAL: See HPI  HEENT: Denies HA  CV: See HPI  PULMONARY: See HPI  GI: See HPI  : Denies urinary alterations, dysuria, urinary frequency, hematuria, and abnormal drainage.   EXT: Denies lower extremity edema or color changes.   SKIN: Denies abnormal rashes or lesions.   MUSCULOSKELETAL: Denies upper  "or lower extremity weakness and pain.   NEUROLOGIC: Denies lightheadedness, dizziness, seizures, or upper or lower extremity paresthesia.     EXAM:  /70 (BP Location: Right arm, Patient Position: Chair, Cuff Size: Adult Regular)   Pulse 90   Ht 1.727 m (5' 8\")   Wt 71 kg (156 lb 8.4 oz)   SpO2 98%   BMI 23.80 kg/m        GENERAL: Appears comfortable, in no acute distress.   HEENT: Eye symmetrical, no discharge or icterus bilaterally. Mucous membranes moist.   CV: RRR, +S1S2, no murmur, rub, or gallop. JVP 2cm above clavicle at 45 degrees.   RESPIRATORY: Respirations regular, even, and unlabored. Lungs CTA throughout.   GI: Soft and non distended with normoactive bowel sounds present in all quadrants. No tenderness, rebound, guarding. No hepatomegaly.   EXTREMITIES: No peripheral edema. 2+ bilateral pedal pulses.   NEUROLOGIC: Alert and oriented x 3. No focal deficits.   MUSCULOSKELETAL: No joint swelling or tenderness.   SKIN: No jaundice. No rashes or lesions.     Labs - reviewed with patient in clinic today:  CBC RESULTS:  Lab Results   Component Value Date    WBC 2.6 (L) 11/13/2019    RBC 3.06 (L) 11/13/2019    HGB 9.9 (L) 11/13/2019    HCT 31.5 (L) 11/13/2019     (H) 11/13/2019    MCH 32.4 11/13/2019    MCHC 31.4 (L) 11/13/2019    RDW 17.4 (H) 11/13/2019     11/13/2019       CMP RESULTS:  Lab Results   Component Value Date     11/13/2019    POTASSIUM 4.5 11/13/2019    CHLORIDE 103 11/13/2019    CO2 26 11/13/2019    ANIONGAP 8 11/13/2019     (H) 11/13/2019    BUN 40 (H) 11/13/2019    CR 7.48 (H) 11/13/2019    GFRESTIMATED 7 (L) 11/13/2019    GFRESTBLACK 8 (L) 11/13/2019    TRINI 8.4 (L) 11/13/2019    BILITOTAL 0.7 10/28/2019    ALBUMIN 3.8 10/28/2019    ALKPHOS 318 (H) 10/28/2019    ALT 14 10/28/2019    AST 11 10/28/2019        INR RESULTS:  Lab Results   Component Value Date    INR 1.13 09/25/2019       LIPID RESULTS:  Lab Results   Component Value Date    CHOL 120 10/28/2019 "    HDL 58 10/28/2019    LDL 37 10/28/2019    TRIG 130 10/28/2019    CHOLHDLRATIO 5.0 08/10/2015       IMMUNOSUPPRESSANT LEVELS:  Lab Results   Component Value Date    TACROL 10.4 11/04/2019    DOSTAC 8:30PM 11 11/04/2019       No components found for: CK  Lab Results   Component Value Date    MAG 1.4 (L) 11/13/2019     Lab Results   Component Value Date    A1C 5.6 09/23/2019     Lab Results   Component Value Date    PHOS 3.5 11/04/2019     Lab Results   Component Value Date    NTBNP 15,996 (H) 11/08/2016     Lab Results   Component Value Date    SAITESTMET [facility] 09/23/2019    SAICELL Class I 09/23/2019    [id number] None 09/23/2019    [id number] None 09/23/2019    SAIREPCOM  09/23/2019      Test performed by modified procedure. Serum heat inactivated and tested   by a modified ([place]) protocol including fetal calf serum addition.   High-risk, mfi >3,000. Mod-risk, mfi 500-3,000.       Lab Results   Component Value Date    SAIITESTME [facility] 09/23/2019    SAIICELL Class II 09/23/2019    [id number] None 09/23/2019    [id number] None 09/23/2019    SAIIREPCOM  09/23/2019      Test performed by modified procedure. Serum heat inactivated and tested   by a modified ([place]) protocol including fetal calf serum addition.   High-risk, mfi >3,000. Mod-risk, mfi 500-3,000.       Lab Results   Component Value Date    CSPEC EDTA PLASMA 10/28/2019       Diagnostic Studies:  TTE 6/10/19  Normal biventricular function, chamber size, wall motion, and wall  thickness.The EF is 65-70%.  Normal RV size and function.  No hemodynamically significant valvular anomalies.  The inferior vena cava was normal in size with preserved respiratory  variability. Estimated mean right atrial pressure is 3 mmHg (normal).  No pericardial effusion is present.     This study was compared with the study from 12/3/2018. There has been no  change.    Cor angiogram 6/28/19  Left Anterior Descending   Prox LAD lesion is 40% stenosed.   First Diagonal Branch   The  vessel is small.   Second Diagonal Branch   The vessel is moderate in size.   Third Diagonal Branch   The vessel is small.   Left Circumflex   First Obtuse Marginal Branch   The vessel is moderate in size.   1st Mrg lesion is 50% stenosed.   Second Obtuse Marginal Branch   The vessel is moderate in size.   2nd Mrg-1 lesion is 80% stenosed.   2nd Mrg-2 lesion is 80% stenosed.   Third Obtuse Marginal Branch   The vessel is moderate in size.   Right Coronary Artery   Prox RCA lesion is 20% stenosed.   Dist RCA lesion is 40% stenosed.   Right Posterior Descending Artery   RPDA lesion is 20% stenosed.       RHC 6/28/19  RA 9  PA 35/16(24)  PCWP 14  Td CO/CI 9.4/5    Assessment/Plan:  Mr. Nicholson is a 64 year old male who is s/p orthotopic heart transplant on 6/14/18 who presents to clinic for follow-up.. Post-op course was complicated leukocytosis for which he received abx, RIJ thrombus infected with CoNS, incidental pneumoperitoneum, necrotic mouth sore with klebsiella and pseudomonas bacteremia, and perforation of the small bowel, status post small-bowel resection and ileostomy. He remains on HD for ESRD. From a cardiac standpoint, he has been stable with normal graft function, filling pressures, and hemodynamics. He has no DSAs. Does have donor CAD. Ongoing leukopenia but improved on lower MMF, still on Valtrex.     # Status post OHT on 6/14/18, history of NICM  # Donor 3 vessel CAD  # Chronic immunosuppression  * Rejection history: none.   * Recent immunosuppression changes: MMF decreased due to leukopenia  * Last biopsy: 10/28/19 negative  * Intolerance to medications: none    Immunosuppression:  - Tacrolimus trough level goal 6-8  - Mycophenolate 500 mg bid     Prophylaxis:  - PPI: pantoprazole 40 mg BID unclear why BID, hx of duodenitis - will investigate if this can be d/c'ed as this will help hypoMg   - CAV/CAD: ASA 81 mg and atorvastatin 40 mg (Crestor cost prohibitive), per last angiogram may benefit from  "lifelong dapt, will defer to Dr Ernst; recommend exercise, a1c 5.6  - CMV high risk D+ R-: Valcyte d/c'ed due to leukopenia previously, low threshold to recheck CMV PCR    # HTN. ?controlled, goal ?<130/80, he will send us BP log, for now continue lisinopril 15 mg bid, hydralazine 100 mg tid, amlodipine 10 mg daily. Could consider increasing lisinopril 20 mg bid and decreasing hydralazine in the future.   # Leukopenia. Improving with decreased MMF . CMV PCR negative last month.   # HSV on buttocks. Still on Valtrex. Patient to clarify duration with derm, stopping Valtrex should help leukopenia.    Chronic issues:  # Hx perforated bowel s/p colostomy, resolved  # Hx klebsiella mouth ulcer, resolved  # Hx pseudomonas bacteremia, resolved   # ESRD listed for renal transplant, on HD TTS  # Pancreatic cyst surveillance. Last MRI 4/2019 showed \"numerous pancreatic cystic foci are similar to those seen on 4/19/2018 with minimal increase in size of one of these cystic foci arising from the pancreatic body (now measuring 14 mm, previously 13  mm). These remain most consistent with side branch intraductal papillary mucinous neoplasms. No suspicious features on this unenhanced exam.\" Abdominal MRI q1-2 years per GI.       25 minutes spent face-to-face with patient, >50% in counseling and/or coordination of care as described above      Ramya Suh, FRANK, NP-C  11/13/2019        "

## 2019-11-14 NOTE — NURSING NOTE
Transplant Coordinator Note  Reason for visit: Follow up to assess BP status after med changes     Health concerns addressed today:  Pt seen in clinic NP Vikash. NP reviewed VS, meds and labs. Pt forgot BP journal, thinks it was elevated last few days -  -150. Will send My Chart message with summary. WBC 2.6; pt to check with derm re how long to take Valtrex.      Due to Allomaps >34 - plan for labs, biopsy and clinic for one year 8 month visit.      Immunosuppressants:   mg BID   FK 2/2 mg - goal 6-8; level pending      No DSAs  Immuknow 258 (mod)  Allomaps 34, 27, 38, 37     Routine screenings:    Derm: Oct 2019 - return to reassess HSV  Dental: Ongoing   Colonoscopy: 2018  Prostate: PSA 1.4  Eye: Ongoing   Flu/Pneumonia: 2019 flu shot done.     Labs: Reviewed with pt; copy provided      Medication record reviewed and reconciled. Questions and concerns addressed. AVS reviewed and copy provided.  Pt verbalized an understanding of plan of care.      Patient Instructions  ~Please call your coordinator at [phone] with any questions or concerns.    ~Lillie will call with tacrolimus level and any med changes  ~Send BP update via My Chart. Please call if consistently greater than 140/90  ~Please follow up with derm re how long to take the Valtrex  ~See you in Feb/March for 1 yr 8 month follow up.   will call you to arrange  ~Please call if you develop any resp symptoms/flu, please call or follow up with primary

## 2019-11-18 ENCOUNTER — TELEPHONE (OUTPATIENT)
Dept: TRANSPLANT | Facility: CLINIC | Age: 64
End: 2019-11-18

## 2019-11-18 DIAGNOSIS — Z94.1 HEART REPLACED BY TRANSPLANT (H): Primary | ICD-10-CM

## 2019-11-18 DIAGNOSIS — Z94.1 HEART REPLACED BY TRANSPLANT (H): ICD-10-CM

## 2019-11-18 DIAGNOSIS — I10 HYPERTENSION: Primary | ICD-10-CM

## 2019-11-18 RX ORDER — LISINOPRIL 20 MG/1
20 TABLET ORAL DAILY
Qty: 30 TABLET | Refills: 11 | Status: SHIPPED | OUTPATIENT
Start: 2019-11-18 | End: 2019-11-19

## 2019-11-18 NOTE — TELEPHONE ENCOUNTER
Reviewed Fk level 6.5 with Dr RAPP - no dose change. Recheck in ~2 weeks.    LEWIS Suh reviewed BP journal sent via My Chart. Requested pt to increase Lisinopril to 20 mg daily. Recheck BMP 1-2 weeks. Pt will send updated BP journal via My Chart.     Pt called with results and med change. Will recheck labs on 12/4/19.

## 2019-11-18 NOTE — TELEPHONE ENCOUNTER
Received Epic message from Britni Hatch RN Tx office  Pt called to ask about his fistulogram appointments for this Friday and how long each appoint is. Coordinator reviewed what is listed out in Epic but directed pt to call COLE Carmen with all specific questions. Pt also wondering what medications he may need to stop prior to procedure. Pt states he has Kermit's contact information and will direct all questions to her.    Writer called patient to clarify fistulogram appt on Friday with check in time at 9 am, procedure starts time at 11 am. Patient also instructed to be NPO 6 hours prior to procedure and he needs a  to  after procedure. Patient also mentioned to resume Plavix, writer will discuss with Dr. Cheney after fistulogram and review results. Patient verbalized acceptance and understanding of plan.

## 2019-11-18 NOTE — TELEPHONE ENCOUNTER
Pt called to ask about his fistulogram appointments for this Friday and how long each appoint is. Coordinator reviewed what is listed out in Epic but directed pt to call COLE Carmen with all specific questions. Pt also wondering what medications he may need to stop prior to procedure. Pt states he has Kermit's contact information and will direct all questions to her.

## 2019-11-19 ENCOUNTER — TELEPHONE (OUTPATIENT)
Dept: TRANSPLANT | Facility: CLINIC | Age: 64
End: 2019-11-19

## 2019-11-19 DIAGNOSIS — Z94.1 HEART REPLACED BY TRANSPLANT (H): ICD-10-CM

## 2019-11-19 DIAGNOSIS — I10 HYPERTENSION: ICD-10-CM

## 2019-11-19 RX ORDER — LISINOPRIL 5 MG/1
TABLET ORAL
Qty: 30 TABLET | Refills: 11 | Status: ON HOLD | OUTPATIENT
Start: 2019-11-19 | End: 2019-12-22

## 2019-11-19 RX ORDER — LISINOPRIL 10 MG/1
TABLET ORAL
Qty: 30 TABLET | Refills: 11 | Status: ON HOLD | OUTPATIENT
Start: 2019-11-19 | End: 2019-12-22

## 2019-11-19 NOTE — TELEPHONE ENCOUNTER
Per Dr. Ernst, resume lisinopril 15 mg daily as recommended by Dr. Mcpherson  (Nephrology). Advised patient to conintue to monitor BP at home. Patient verbalized understanding.

## 2019-11-20 ENCOUNTER — TELEPHONE (OUTPATIENT)
Dept: INTERVENTIONAL RADIOLOGY/VASCULAR | Facility: CLINIC | Age: 64
End: 2019-11-20

## 2019-11-22 ENCOUNTER — HOSPITAL ENCOUNTER (OUTPATIENT)
Facility: CLINIC | Age: 64
Discharge: HOME | End: 2019-11-22
Attending: RADIOLOGY | Admitting: RADIOLOGY
Payer: MEDICARE

## 2019-11-22 ENCOUNTER — APPOINTMENT (OUTPATIENT)
Dept: MEDSURG UNIT | Facility: CLINIC | Age: 64
End: 2019-11-22
Attending: RADIOLOGY
Payer: MEDICARE

## 2019-11-22 ENCOUNTER — APPOINTMENT (OUTPATIENT)
Dept: INTERVENTIONAL RADIOLOGY/VASCULAR | Facility: CLINIC | Age: 64
End: 2019-11-22
Attending: CLINICAL NURSE SPECIALIST
Payer: MEDICARE

## 2019-11-22 VITALS
OXYGEN SATURATION: 98 % | SYSTOLIC BLOOD PRESSURE: 112 MMHG | RESPIRATION RATE: 22 BRPM | TEMPERATURE: 98.2 F | HEART RATE: 90 BPM | DIASTOLIC BLOOD PRESSURE: 70 MMHG

## 2019-11-22 DIAGNOSIS — T82.9XXA COMPLICATION OF VASCULAR ACCESS FOR DIALYSIS, INITIAL ENCOUNTER: ICD-10-CM

## 2019-11-22 DIAGNOSIS — Z99.2 ESRD ON DIALYSIS (H): ICD-10-CM

## 2019-11-22 DIAGNOSIS — N18.6 ESRD ON DIALYSIS (H): ICD-10-CM

## 2019-11-22 LAB
ANION GAP SERPL CALCULATED.3IONS-SCNC: 9 MMOL/L (ref 3–14)
BUN SERPL-MCNC: 30 MG/DL (ref 7–30)
CALCIUM SERPL-MCNC: 8.4 MG/DL (ref 8.5–10.1)
CHLORIDE SERPL-SCNC: 106 MMOL/L (ref 94–109)
CO2 SERPL-SCNC: 24 MMOL/L (ref 20–32)
CREAT SERPL-MCNC: 6.12 MG/DL (ref 0.66–1.25)
ERYTHROCYTE [DISTWIDTH] IN BLOOD BY AUTOMATED COUNT: 19 % (ref 10–15)
GFR SERPL CREATININE-BSD FRML MDRD: 9 ML/MIN/{1.73_M2}
GLUCOSE SERPL-MCNC: 100 MG/DL (ref 70–99)
HCT VFR BLD AUTO: 31.5 % (ref 40–53)
HGB BLD-MCNC: 10 G/DL (ref 13.3–17.7)
INR PPP: 1.17 (ref 0.86–1.14)
MCH RBC QN AUTO: 33.3 PG (ref 26.5–33)
MCHC RBC AUTO-ENTMCNC: 31.7 G/DL (ref 31.5–36.5)
MCV RBC AUTO: 105 FL (ref 78–100)
PLATELET # BLD AUTO: 234 10E9/L (ref 150–450)
POTASSIUM SERPL-SCNC: 4.2 MMOL/L (ref 3.4–5.3)
RBC # BLD AUTO: 3 10E12/L (ref 4.4–5.9)
SODIUM SERPL-SCNC: 138 MMOL/L (ref 133–144)
WBC # BLD AUTO: 3.1 10E9/L (ref 4–11)

## 2019-11-22 PROCEDURE — 85027 COMPLETE CBC AUTOMATED: CPT | Performed by: RADIOLOGY

## 2019-11-22 PROCEDURE — 36902 INTRO CATH DIALYSIS CIRCUIT: CPT

## 2019-11-22 PROCEDURE — 99152 MOD SED SAME PHYS/QHP 5/>YRS: CPT

## 2019-11-22 PROCEDURE — C1887 CATHETER, GUIDING: HCPCS

## 2019-11-22 PROCEDURE — 27211134 ZZH SHEATH CR2

## 2019-11-22 PROCEDURE — 27210888 ZZH ACCESSORY CR7

## 2019-11-22 PROCEDURE — 80048 BASIC METABOLIC PNL TOTAL CA: CPT | Performed by: RADIOLOGY

## 2019-11-22 PROCEDURE — 85610 PROTHROMBIN TIME: CPT | Performed by: RADIOLOGY

## 2019-11-22 PROCEDURE — 27210908 ZZH NEEDLE CR4

## 2019-11-22 PROCEDURE — C1725 CATH, TRANSLUMIN NON-LASER: HCPCS

## 2019-11-22 PROCEDURE — 27210910 ZZH NEEDLE CR6

## 2019-11-22 PROCEDURE — 25000128 H RX IP 250 OP 636: Performed by: STUDENT IN AN ORGANIZED HEALTH CARE EDUCATION/TRAINING PROGRAM

## 2019-11-22 PROCEDURE — 27210845 ZZH DEVICE INFLATION CR5

## 2019-11-22 PROCEDURE — 27210886 ZZH ACCESSORY CR5

## 2019-11-22 PROCEDURE — 25000125 ZZHC RX 250: Performed by: STUDENT IN AN ORGANIZED HEALTH CARE EDUCATION/TRAINING PROGRAM

## 2019-11-22 PROCEDURE — C1769 GUIDE WIRE: HCPCS

## 2019-11-22 PROCEDURE — 40000166 ZZH STATISTIC PP CARE STAGE 1

## 2019-11-22 PROCEDURE — 25800030 ZZH RX IP 258 OP 636: Performed by: PHYSICIAN ASSISTANT

## 2019-11-22 PROCEDURE — 99153 MOD SED SAME PHYS/QHP EA: CPT

## 2019-11-22 PROCEDURE — 27210732 ZZH ACCESSORY CR1

## 2019-11-22 PROCEDURE — 25800030 ZZH RX IP 258 OP 636: Performed by: RADIOLOGY

## 2019-11-22 PROCEDURE — 25000128 H RX IP 250 OP 636: Performed by: RADIOLOGY

## 2019-11-22 PROCEDURE — 25500064 ZZH RX 255 OP 636: Performed by: RADIOLOGY

## 2019-11-22 RX ORDER — LIDOCAINE 40 MG/G
CREAM TOPICAL
Status: DISCONTINUED | OUTPATIENT
Start: 2019-11-22 | End: 2019-11-22 | Stop reason: HOSPADM

## 2019-11-22 RX ORDER — IODIXANOL 320 MG/ML
100 INJECTION, SOLUTION INTRAVASCULAR ONCE
Status: COMPLETED | OUTPATIENT
Start: 2019-11-22 | End: 2019-11-22

## 2019-11-22 RX ORDER — SODIUM CHLORIDE 9 MG/ML
INJECTION, SOLUTION INTRAVENOUS CONTINUOUS
Status: DISCONTINUED | OUTPATIENT
Start: 2019-11-22 | End: 2019-11-22 | Stop reason: HOSPADM

## 2019-11-22 RX ORDER — HEPARIN SOD,PORCINE/0.9 % NACL 5K/1000 ML
1000-10000 INTRAVENOUS SOLUTION INTRAVENOUS
Status: COMPLETED | OUTPATIENT
Start: 2019-11-22 | End: 2019-11-22

## 2019-11-22 RX ORDER — NICOTINE POLACRILEX 4 MG
15-30 LOZENGE BUCCAL
Status: DISCONTINUED | OUTPATIENT
Start: 2019-11-22 | End: 2019-11-22 | Stop reason: HOSPADM

## 2019-11-22 RX ORDER — HEPARIN SODIUM 1000 [USP'U]/ML
500-6000 INJECTION, SOLUTION INTRAVENOUS; SUBCUTANEOUS
Status: COMPLETED | OUTPATIENT
Start: 2019-11-22 | End: 2019-11-22

## 2019-11-22 RX ORDER — DEXTROSE MONOHYDRATE 25 G/50ML
25-50 INJECTION, SOLUTION INTRAVENOUS
Status: DISCONTINUED | OUTPATIENT
Start: 2019-11-22 | End: 2019-11-22 | Stop reason: HOSPADM

## 2019-11-22 RX ORDER — FENTANYL CITRATE 50 UG/ML
25-50 INJECTION, SOLUTION INTRAMUSCULAR; INTRAVENOUS EVERY 5 MIN PRN
Status: DISCONTINUED | OUTPATIENT
Start: 2019-11-22 | End: 2019-11-22 | Stop reason: HOSPADM

## 2019-11-22 RX ORDER — NALOXONE HYDROCHLORIDE 0.4 MG/ML
.1-.4 INJECTION, SOLUTION INTRAMUSCULAR; INTRAVENOUS; SUBCUTANEOUS
Status: DISCONTINUED | OUTPATIENT
Start: 2019-11-22 | End: 2019-11-22 | Stop reason: HOSPADM

## 2019-11-22 RX ORDER — FLUMAZENIL 0.1 MG/ML
0.2 INJECTION, SOLUTION INTRAVENOUS
Status: DISCONTINUED | OUTPATIENT
Start: 2019-11-22 | End: 2019-11-22 | Stop reason: HOSPADM

## 2019-11-22 RX ADMIN — MIDAZOLAM 1 MG: 1 INJECTION INTRAMUSCULAR; INTRAVENOUS at 11:58

## 2019-11-22 RX ADMIN — MIDAZOLAM 1 MG: 1 INJECTION INTRAMUSCULAR; INTRAVENOUS at 11:53

## 2019-11-22 RX ADMIN — FENTANYL CITRATE 50 MCG: 50 INJECTION, SOLUTION INTRAMUSCULAR; INTRAVENOUS at 11:53

## 2019-11-22 RX ADMIN — IODIXANOL 70 ML: 320 INJECTION, SOLUTION INTRAVASCULAR at 13:13

## 2019-11-22 RX ADMIN — HEPARIN SODIUM 5000 UNITS: 1000 INJECTION INTRAVENOUS; SUBCUTANEOUS at 12:25

## 2019-11-22 RX ADMIN — SODIUM CHLORIDE: 9 INJECTION, SOLUTION INTRAVENOUS at 10:56

## 2019-11-22 RX ADMIN — MIDAZOLAM 1 MG: 1 INJECTION INTRAMUSCULAR; INTRAVENOUS at 12:36

## 2019-11-22 RX ADMIN — LIDOCAINE HYDROCHLORIDE 8 ML: 10 INJECTION, SOLUTION EPIDURAL; INFILTRATION; INTRACAUDAL; PERINEURAL at 12:29

## 2019-11-22 RX ADMIN — FENTANYL CITRATE 50 MCG: 50 INJECTION, SOLUTION INTRAMUSCULAR; INTRAVENOUS at 12:11

## 2019-11-22 RX ADMIN — MIDAZOLAM 1 MG: 1 INJECTION INTRAMUSCULAR; INTRAVENOUS at 12:11

## 2019-11-22 RX ADMIN — FENTANYL CITRATE 50 MCG: 50 INJECTION, SOLUTION INTRAMUSCULAR; INTRAVENOUS at 11:58

## 2019-11-22 RX ADMIN — FENTANYL CITRATE 50 MCG: 50 INJECTION, SOLUTION INTRAMUSCULAR; INTRAVENOUS at 12:36

## 2019-11-22 RX ADMIN — HEPARIN SODIUM 2100 UNITS: 1000 INJECTION, SOLUTION INTRAVENOUS; SUBCUTANEOUS at 12:17

## 2019-11-22 NOTE — PROGRESS NOTES
Patient Name: Murray Nicholson  Medical Record Number: [id number]  Today's Date: 11/22/2019    Procedure: Left Arm dialysis fistulagram and fistuloplasty  Proceduralist: Dr Norma Degroot    Sedation start time: 1152  Sedation end time: 1312  Sedation medications administered: versed 4 mg, fentanyl 200 mcg   Total sedation time: 83 minutes      Procedure start time: 1149  Puncture time: 1152  Procedure end time: 1312    Report given to: TONY Aguilar IR    Other Notes: Pt arrived to IR room 5 from . Consent reviewed and verified. Pt denies any questions or concerns regarding procedure.   Pt positioned supine and monitored per protocol.   Arterial and venous punctures made in left upper arm fistula, plasty done, sheaths removed pressure held until hemostasis, tip - stop dressings placed.  Pt tolerated procedure without any noted complications.   Pt transferred back to .

## 2019-11-22 NOTE — DISCHARGE INSTRUCTIONS
[facility]      Interventional Radiology  Discharge Instructions Following Fistulogram      ? You may resume normal activities as tolerated, but avoid any strenuous activity or heavy lifting (>10 lbs.) involving your access arm.    ? Elevate and rest your arm as much as possible for the first 24 hours after the procedure.  This will promote healing and reduce swelling.     ? If bleeding or oozing should occur, apply fingertip pressure to puncture site to control bleeding, but avoid excessive pressure as this may clot off the fistula.  Hold light pressure for 10 minutes, or until bleeding/oozing is controlled.  If excessive bleeding is noted or if you are having difficulty controlling the bleeding with direct pressure, call 911.     ? If you develop fever, chills, excessive pain/tenderness or drainage at the puncture site, or have questions call your Doctor or Dialysis Center.     ? If you received sedation for your procedure: An adult should stay with you for 24 hours, Do Not drive a motor vehicle, operate machinery, or drink alcoholic beverages for 24 hours.     ? You may resume your normal medications immediately    ? If you notice sudden loss of pulse or thrill (buzzing feeling) in your fistula, contact your Doctor or Dialysis unit immediately.           [facility] INTERVENTIONAL RADIOLOGY DEPARTMENT  Procedure Physician: Dr. Dequan Green                                              Date of procedure: November 22, 2019  Telephone Numbers: [phone] Monday-Friday 8:00 am to 4:30 pm  [phone] After 4:30 pm Monday-Friday, Weekends & Holidays.   Ask for the Interventional Radiologist on call.  Someone is on call 24 hrs/day  [facility] toll free number: [phone] Monday-Friday 8:00 am to 4:30 pm  [facility] Emergency Dept: [phone]

## 2019-11-22 NOTE — PRE-PROCEDURE
GENERAL PRE-PROCEDURE:   Procedure:  Left fistulogram with possible fistuloplasty  Date/Time:  11/22/2019 10:29 AM    Verbal consent obtained?: Yes    Written consent obtained?: Yes    Risks and benefits: Risks, benefits and alternatives were discussed    Consent given by:  Patient  Patient states understanding of procedure being performed: Yes    Patient's understanding of procedure matches consent: Yes    Procedure consent matches procedure scheduled: Yes    Expected level of sedation:  Moderate  Appropriately NPO:  Yes  ASA Class:  Class 1- healthy patient  Mallampati  :  Grade 2- soft palate, base of uvula, tonsillar pillars, and portion of posterior pharyngeal wall visible  Lungs:  Lungs clear with good breath sounds bilaterally  Heart:  Normal heart sounds and rate  History & Physical reviewed:  History and physical reviewed and no updates needed  Statement of review:  I have reviewed the lab findings, diagnostic data, medications, and the plan for sedation

## 2019-11-22 NOTE — PROGRESS NOTES
IV discontinued. Discharge paperwork reviewed with patient. Patients ride will be picking him up in the lobby at 1500.

## 2019-11-22 NOTE — IP AVS SNAPSHOT
[address] [address]                                    After Visit Summary   11/22/2019    Murray Nicholson    MRN: [id number]           After Visit Summary Signature Page    I have received my discharge instructions, and my questions have been answered. I have discussed any challenges I see with this plan with the nurse or doctor.    ..........................................................................................................................................  Patient/Patient Representative Signature      ..........................................................................................................................................  Patient Representative Print Name and Relationship to Patient    ..................................................               ................................................  Date                                   Time    ..........................................................................................................................................  Reviewed by Signature/Title    ...................................................              ..............................................  Date                                               Time          22EPIC Rev 08/18

## 2019-11-22 NOTE — PROGRESS NOTES
Patient arrived to floor with RN from IR s/p left arm fistulogram. VSS. 2 tip stops to upper left arm, CDI no hematoma. +bruit/+thrill. Pt denies pain. Tolerating regular diet. Plan discharge at 1500 per MD orders.

## 2019-11-22 NOTE — PROGRESS NOTES
Prep complete for Left Fistulogram with possible fistuloplasty. Friend José Miguel will be picking up patient post procedure.

## 2019-11-22 NOTE — PROCEDURES
[facility], [place]    Procedure: IR Procedure Note  Date/Time: 11/22/2019 1:24 PM  Performed by: Yu Rodriguez MD  Authorized by: Yu Rodriguez MD   IR Fellow Physician:  Radiology Resident Physician: Dequan Green      UNIVERSAL PROTOCOL   Site Marked: Yes  Prior Images Obtained and Reviewed:  Yes  Required items: Required blood products, implants, devices and special equipment available    Patient identity confirmed:  Verbally with patient  Patient was reevaluated immediately before administering moderate or deep sedation or anesthesia  Confirmation Checklist:  Patient's identity using two indicators, relevant allergies, procedure was appropriate and matched the consent or emergent situation and correct equipment/implants were available  Time out: Immediately prior to the procedure a time out was called    Preparation: Patient was prepped and draped in usual sterile fashion       ANESTHESIA    Local Anesthetic: Lidocaine 1% with epinephrine      SEDATION    Patient Sedated: Yes    Sedation:  Midazolam and fentanyl  Vital signs: Vital signs monitored during sedation    See dictated procedure note for full details.  Findings: Successful left arm fistuloplasty     Specimens: none    Complications: None    Condition: Stable    Plan: -Return to  for post sedation monitoring    PROCEDURE   Patient Tolerance:  Patient tolerated the procedure well with no immediate complications    Length of time physician/provider present for 1:1 monitoring during sedation: 75

## 2019-11-22 NOTE — IP AVS SNAPSHOT
MRN:[id number]                      After Visit Summary   11/22/2019    Murray Nicholson    MRN: [id number]           Visit Information        Department      11/22/2019  9:46 AM [address] [facility]          Review of your medicines      UNREVIEWED medicines. Ask your doctor about these medicines       Dose / Directions   acetaminophen 500 MG tablet  Commonly known as:  TYLENOL  Used for:  Ileostomy status (H)      Dose:  1,000 mg  Take 2 tablets (1,000 mg) by mouth 4 times daily  Quantity:  60 tablet  Refills:  1     amLODIPine 10 MG tablet  Commonly known as:  NORVASC  Used for:  Hypertension goal BP (blood pressure) < 130/80      Dose:  10 mg  Take 1 tablet (10 mg) by mouth daily  Quantity:  90 tablet  Refills:  3     aspirin 81 MG EC tablet      Dose:  81 mg  Take 81 mg by mouth daily  Refills:  0     atorvastatin 40 MG tablet  Commonly known as:  LIPITOR  Used for:  Heart replaced by transplant (H)      Dose:  40 mg  Take 1 tablet (40 mg) by mouth daily  Quantity:  90 tablet  Refills:  3     biotin 5 MG Caps  Commonly known as:  BIOTIN 5000  Used for:  Brittle nails      Dose:  5 mg  Take 5 mg by mouth daily  Quantity:  30 capsule  Refills:  3     fluticasone 50 MCG/ACT nasal spray  Commonly known as:  FLONASE  Used for:  Runny nose      Dose:  1 spray  Spray 1 spray into both nostrils daily  Quantity:  11.1 mL  Refills:  11     hydrALAZINE 100 MG tablet  Commonly known as:  APRESOLINE  Used for:  Essential hypertension      Dose:  100 mg  Take 1 tablet (100 mg) by mouth every 8 hours  Quantity:  90 tablet  Refills:  11     lidocaine-prilocaine 2.5-2.5 % external cream  Commonly known as:  EMLA      apply 1 hour prior to dialysis  Refills:  0     * lisinopril 10 MG tablet  Commonly known as:  PRINIVIL/ZESTRIL  Used for:  Hypertension, Heart replaced by transplant (H)      Take ONE 5 mg tab with ONE 10 mg tab daily (to equal 15 mg daily).  Quantity:  30 tablet  Refills:  11     * lisinopril 5 MG  tablet  Commonly known as:  PRINIVIL/ZESTRIL  Used for:  Hypertension, Heart replaced by transplant (H)      Take ONE 5 mg tab with ONE 10 mg tab daily (to equal 15 mg daily).  Quantity:  30 tablet  Refills:  11     loratadine 10 MG tablet  Commonly known as:  CLARITIN  Used for:  Hypertensive cardiopathy, SOB (shortness of breath)      Dose:  10 mg  Take 10 mg by mouth daily as needed Reported on 5/3/2017  Refills:  0     melatonin 1 MG Tabs tablet  Used for:  Heart transplanted (H)      Dose:  2 mg  Take 2 tablets (2 mg) by mouth nightly as needed for sleep  Quantity:  120 tablet  Refills:  1     multivitamin RENAL 1 MG capsule  Used for:  End stage renal disease (H)      Dose:  1 capsule  Take 1 capsule by mouth daily  Quantity:  120 capsule  Refills:  0     mupirocin 2 % external ointment  Commonly known as:  BACTROBAN  Used for:  Skin ulcer, limited to breakdown of skin (H)      Apply topically 3 times daily  Quantity:  15 g  Refills:  0     mycophenolate 250 MG capsule  Commonly known as:  GENERIC EQUIVALENT  Used for:  Heart transplanted (H)      Dose:  500 mg  Take 2 capsules (500 mg) by mouth 2 times daily  Quantity:  120 capsule  Refills:  11     pantoprazole 40 MG EC tablet  Commonly known as:  PROTONIX  Indication:  GI prophylaxis  Used for:  Duodenitis      Dose:  40 mg  Take 1 tablet (40 mg) by mouth 2 times daily (before meals)  Quantity:  60 tablet  Refills:  11     tacrolimus 1 MG capsule  Commonly known as:  GENERIC EQUIVALENT  Used for:  Heart transplant recipient (H)      Take two capsules in the AM (2 mg) and two capsules in the PM (2 mg)  Quantity:  120 capsule  Refills:  11     valACYclovir 1000 mg tablet  Commonly known as:  VALTREX  Used for:  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 Read the directions carefully, and ask your doctor or other care provider to review them with you.                  Protect others around you: Learn how to safely use, store and throw away your medicines at www.disposemymeds.org.       Follow-ups after your visit       Your next 10 appointments already scheduled    Dec 04, 2019  8:30 AM CST  LAB with [facility] ([facility]) [address] [address] [phone]   Please do not eat 10-12 hours before your appointment if you are coming in fasting for labs on lipids, cholesterol, or glucose (sugar). Does not apply to pregnant women. Water, tea and black coffee (with nothing added) is okay. Do not drink other fluids, diet soda or gum. If you have concerns about taking your medications, please send a message by clicking on Secure Messaging, Message Your Care Team.        Care Instructions       Further instructions from your care team         [facility]      Interventional Radiology  Discharge Instructions Following Fistulogram      ? You may resume normal activities as tolerated, but avoid any strenuous activity or heavy lifting (>10 lbs.) involving your access arm.    ? Elevate and rest your arm as much as possible for the first 24 hours after the procedure.  This will promote healing and reduce swelling.     ? If bleeding or oozing should occur, apply fingertip pressure to puncture site to control bleeding, but avoid excessive pressure as this may clot off the fistula.  Hold light pressure for 10 minutes, or until bleeding/oozing is controlled.  If excessive bleeding is noted or if you are having difficulty controlling the bleeding with direct pressure, call 911.     ? If you develop fever, chills, excessive pain/tenderness or drainage at the puncture site, or have questions call your Doctor or Dialysis Center.     ? If you received sedation for your  procedure: An adult should stay with you for 24 hours, Do Not drive a motor vehicle, operate machinery, or drink alcoholic beverages for 24 hours.     ? You may resume your normal medications immediately    ? If you notice sudden loss of pulse or thrill (buzzing feeling) in your fistula, contact your Doctor or Dialysis unit immediately.           [facility] INTERVENTIONAL RADIOLOGY DEPARTMENT  Procedure Physician: Dr. Dequan Green                                              Date of procedure: November 22, 2019  Telephone Numbers: [phone] Monday-Friday 8:00 am to 4:30 pm  [phone] After 4:30 pm Monday-Friday, Weekends & Holidays.   Ask for the Interventional Radiologist on call.  Someone is on call 24 hrs/day  [facility] toll free number: [phone] Monday-Friday 8:00 am to 4:30 pm  [facility] Emergency Dept: [phone]            Additional Information About Your Visit       MyChart Information    [employer] gives you secure access to your electronic health record. If you see a primary care provider, you can also send messages to your care team and make appointments. If you have questions, please call your primary care clinic.  If you do not have a primary care provider, please call [phone] and they will assist you.       Care EveryWhere ID    This is your Care EveryWhere ID. This could be used by other organizations to access your [facility] medical records  [id number]       Your Vitals Were  Most recent update: 11/22/2019  2:31 PM    Blood Pressure   112/78 (BP Location: Right arm)    Pulse   90    Temperature   98.2  F (36.8  C)    Respirations   20    Pulse Oximetry   99%          Primary Care Provider Office Phone # Fax #    Yahir Turcios [phone]      Equal Access to Services    [facility] : [facility], [facility], [facility] jose sparks. So [facility] [phone].    ATENCIÓN: Si habla español, tiene a ortiz disposición  servicios gratuitos de asistencia lingüística. Keely rodriguez [phone].    We comply with applicable federal civil rights laws and [place] laws. We do not discriminate on the basis of race, color, national origin, age, disability, sex, sexual orientation, or gender identity.           Thank you!    Thank you for choosing [facility] for your care. Our goal is always to provide you with excellent care. Hearing back from our patients is one way we can continue to improve our services. Please take a few minutes to complete the written survey that you may receive in the mail after you visit with us. Thank you!            Medication List      ASK your doctor about these medications          Morning Afternoon Evening Bedtime As Needed    acetaminophen 500 MG tablet  Also known as:  TYLENOL  INSTRUCTIONS:  Take 2 tablets (1,000 mg) by mouth 4 times daily                     amLODIPine 10 MG tablet  Also known as:  NORVASC  INSTRUCTIONS:  Take 1 tablet (10 mg) by mouth daily                     aspirin 81 MG EC tablet  INSTRUCTIONS:  Take 81 mg by mouth daily                     atorvastatin 40 MG tablet  Also known as:  LIPITOR  INSTRUCTIONS:  Take 1 tablet (40 mg) by mouth daily                     biotin 5 MG Caps  Also known as:  BIOTIN 5000  INSTRUCTIONS:  Take 5 mg by mouth daily                     fluticasone 50 MCG/ACT nasal spray  Also known as:  FLONASE  INSTRUCTIONS:  Spray 1 spray into both nostrils daily  Doctor's comments:  Okay to dispense generic equivalent, other formulation, or similar medication covered by patient's insurance                     hydrALAZINE 100 MG tablet  Also known as:  APRESOLINE  INSTRUCTIONS:  Take 1 tablet (100 mg) by mouth every 8 hours                     lidocaine-prilocaine 2.5-2.5 % external cream  Also known as:  EMLA  INSTRUCTIONS:  apply 1 hour prior to dialysis                     * lisinopril 10 MG tablet  Also known as:  PRINIVIL/ZESTRIL  INSTRUCTIONS:  Take ONE 5 mg tab  with ONE 10 mg tab daily (to equal 15 mg daily).                     * lisinopril 5 MG tablet  Also known as:  PRINIVIL/ZESTRIL  INSTRUCTIONS:  Take ONE 5 mg tab with ONE 10 mg tab daily (to equal 15 mg daily).                     loratadine 10 MG tablet  Also known as:  CLARITIN  INSTRUCTIONS:  Take 10 mg by mouth daily as needed Reported on 5/3/2017                     melatonin 1 MG Tabs tablet  INSTRUCTIONS:  Take 2 tablets (2 mg) by mouth nightly as needed for sleep                     multivitamin RENAL 1 MG capsule  INSTRUCTIONS:  Take 1 capsule by mouth daily                     mupirocin 2 % external ointment  Also known as:  BACTROBAN  INSTRUCTIONS:  Apply topically 3 times daily                     mycophenolate 250 MG capsule  Also known as:  GENERIC EQUIVALENT  INSTRUCTIONS:  Take 2 capsules (500 mg) by mouth 2 times daily  Doctor's comments:  TXP DT 6/14/2018 TXP Dischg DT Heart replaced by transplant Z94.1 [facility] ([place], [place]) Decrease in dose                     pantoprazole 40 MG EC tablet  Also known as:  PROTONIX  INSTRUCTIONS:  Take 1 tablet (40 mg) by mouth 2 times daily (before meals)  Reason for med:  GI prophylaxis                     tacrolimus 1 MG capsule  Also known as:  GENERIC EQUIVALENT  INSTRUCTIONS:  Take two capsules in the AM (2 mg) and two capsules in the PM (2 mg)  Doctor's comments:  TXP DT 6/14/2018 (Heart) TXP Dischg DT  DX Heart replaced by transplant Z94.1 [facility] [place] Decrease in dose                     valACYclovir 1000 mg tablet  Also known as:  VALTREX  INSTRUCTIONS:  Take 1 tablet (1,000 mg) by mouth 2 times daily for 10 days                        * This list has 2 medication(s) that are the same as other medications prescribed for you. Read the directions carefully, and ask your doctor or other care provider to review them with you.

## 2019-11-25 NOTE — TELEPHONE ENCOUNTER
This is not a new patient. Sending task to schedulers to contact patient to schedule a follow up appointment.   Grecia Benton CMA   11/25/2019 4:42 PM

## 2019-12-04 ENCOUNTER — ORGAN (OUTPATIENT)
Dept: TRANSPLANT | Facility: CLINIC | Age: 64
End: 2019-12-04

## 2019-12-04 DIAGNOSIS — Z94.1 HEART REPLACED BY TRANSPLANT (H): ICD-10-CM

## 2019-12-04 LAB
ANION GAP SERPL CALCULATED.3IONS-SCNC: 8 MMOL/L (ref 3–14)
BUN SERPL-MCNC: 41 MG/DL (ref 7–30)
CALCIUM SERPL-MCNC: 8.7 MG/DL (ref 8.5–10.1)
CHLORIDE SERPL-SCNC: 103 MMOL/L (ref 94–109)
CO2 SERPL-SCNC: 25 MMOL/L (ref 20–32)
CREAT SERPL-MCNC: 6.26 MG/DL (ref 0.66–1.25)
ERYTHROCYTE [DISTWIDTH] IN BLOOD BY AUTOMATED COUNT: 19 % (ref 10–15)
GFR SERPL CREATININE-BSD FRML MDRD: 9 ML/MIN/{1.73_M2}
GLUCOSE SERPL-MCNC: 149 MG/DL (ref 70–99)
HCT VFR BLD AUTO: 33.9 % (ref 40–53)
HGB BLD-MCNC: 10.8 G/DL (ref 13.3–17.7)
MAGNESIUM SERPL-MCNC: 1.6 MG/DL (ref 1.6–2.3)
MCH RBC QN AUTO: 34.7 PG (ref 26.5–33)
MCHC RBC AUTO-ENTMCNC: 31.9 G/DL (ref 31.5–36.5)
MCV RBC AUTO: 109 FL (ref 78–100)
PHOSPHATE SERPL-MCNC: 2.6 MG/DL (ref 2.5–4.5)
PLATELET # BLD AUTO: 206 10E9/L (ref 150–450)
POTASSIUM SERPL-SCNC: 4.5 MMOL/L (ref 3.4–5.3)
RBC # BLD AUTO: 3.11 10E12/L (ref 4.4–5.9)
SODIUM SERPL-SCNC: 136 MMOL/L (ref 133–144)
TACROLIMUS BLD-MCNC: 7 UG/L (ref 5–15)
TME LAST DOSE: NORMAL H
WBC # BLD AUTO: 3.3 10E9/L (ref 4–11)

## 2019-12-04 PROCEDURE — 80197 ASSAY OF TACROLIMUS: CPT | Performed by: INTERNAL MEDICINE

## 2019-12-05 ENCOUNTER — TELEPHONE (OUTPATIENT)
Dept: TRANSPLANT | Facility: CLINIC | Age: 64
End: 2019-12-05

## 2019-12-05 NOTE — TELEPHONE ENCOUNTER
Call to Murray to discuss the NKR chain ending to our center and he is identified as the recipient. He would need to be able to see our pre op team 12/10/19, with a surgical date of 12/19/2019.   He is agreeable to this and understands Kari will be calling him tomorrow to finish the details of timing.

## 2019-12-06 ENCOUNTER — RESULTS ONLY (OUTPATIENT)
Dept: OTHER | Facility: CLINIC | Age: 64
End: 2019-12-06

## 2019-12-06 ENCOUNTER — TELEPHONE (OUTPATIENT)
Dept: TRANSPLANT | Facility: CLINIC | Age: 64
End: 2019-12-06

## 2019-12-06 DIAGNOSIS — N18.6 ESRD (END STAGE RENAL DISEASE) (H): Primary | ICD-10-CM

## 2019-12-06 DIAGNOSIS — Z76.82 AWAITING ORGAN TRANSPLANT: ICD-10-CM

## 2019-12-06 DIAGNOSIS — R73.03 PRE-DIABETES: ICD-10-CM

## 2019-12-06 DIAGNOSIS — N18.6 ESRD (END STAGE RENAL DISEASE) (H): ICD-10-CM

## 2019-12-06 NOTE — TELEPHONE ENCOUNTER
Patient called to discuss transplant scheduling. Provided the schedule details and discussed that an more detailed email will be coming his way today. Patient is very emotional and extremely grateful for this opportunity.

## 2019-12-09 ENCOUNTER — TELEPHONE (OUTPATIENT)
Dept: TRANSPLANT | Facility: CLINIC | Age: 64
End: 2019-12-09

## 2019-12-09 NOTE — TELEPHONE ENCOUNTER
"Spoke with Murray and asked if he could be at preop at 9:30 tomorrow morning. Murray says \"I'll be there at 2:00 in the morning if that's what you want me to do!\". Henna will ask schedulers to make the change showing Shannan seeing patient at 9:30 a.m.   "

## 2019-12-09 NOTE — TELEPHONE ENCOUNTER
Coordinator left pt msg, requesting call back. Would like to review details of upcoming kidney transplant on 12/19/19 with pre-op appointments on 12/10/19. Contact number provided.    Coordinator called dialysis w/ transplant details. Pt's heart transplant coordinator and nephrologist notified with transplant details via Epic msg.    Pt called back and coordinator went over instructions for upcoming transplant on 12/19/19.  Pt will have pre-op appointments and final crossmatch on 12/10/19.  Reviewed general components of inpt stay and post-transplant routines, including frequent appointments here as an outpt x1-2 weeks.  PFR notified of transplant.  Nephrologist and dialysis notified of pre-op appointments and transplant dates.    Pt denies being on steroids or hormone replacements. Pt is on ASA and has discontinued it.  Pt verbalized understanding and had no further questions.

## 2019-12-10 ENCOUNTER — OFFICE VISIT (OUTPATIENT)
Dept: TRANSPLANT | Facility: CLINIC | Age: 64
End: 2019-12-10
Attending: SURGERY
Payer: MEDICARE

## 2019-12-10 ENCOUNTER — APPOINTMENT (OUTPATIENT)
Dept: TRANSPLANT | Facility: CLINIC | Age: 64
End: 2019-12-10
Attending: SURGERY
Payer: MEDICARE

## 2019-12-10 ENCOUNTER — RESULTS ONLY (OUTPATIENT)
Dept: OTHER | Facility: CLINIC | Age: 64
End: 2019-12-10

## 2019-12-10 ENCOUNTER — ANCILLARY PROCEDURE (OUTPATIENT)
Dept: GENERAL RADIOLOGY | Facility: CLINIC | Age: 64
End: 2019-12-10
Payer: MEDICARE

## 2019-12-10 ENCOUNTER — OFFICE VISIT (OUTPATIENT)
Dept: TRANSPLANT | Facility: CLINIC | Age: 64
End: 2019-12-10
Attending: NURSE PRACTITIONER
Payer: MEDICARE

## 2019-12-10 ENCOUNTER — OFFICE VISIT (OUTPATIENT)
Dept: NEPHROLOGY | Facility: CLINIC | Age: 64
End: 2019-12-10
Attending: SURGERY
Payer: MEDICARE

## 2019-12-10 VITALS
DIASTOLIC BLOOD PRESSURE: 73 MMHG | WEIGHT: 157.41 LBS | SYSTOLIC BLOOD PRESSURE: 128 MMHG | OXYGEN SATURATION: 100 % | HEART RATE: 87 BPM | BODY MASS INDEX: 23.86 KG/M2 | HEIGHT: 68 IN

## 2019-12-10 VITALS
HEART RATE: 87 BPM | DIASTOLIC BLOOD PRESSURE: 73 MMHG | TEMPERATURE: 100 F | OXYGEN SATURATION: 100 % | WEIGHT: 157.41 LBS | BODY MASS INDEX: 23.86 KG/M2 | HEIGHT: 68 IN | SYSTOLIC BLOOD PRESSURE: 128 MMHG

## 2019-12-10 DIAGNOSIS — R73.03 PRE-DIABETES: ICD-10-CM

## 2019-12-10 DIAGNOSIS — N18.6 ESRD (END STAGE RENAL DISEASE) (H): ICD-10-CM

## 2019-12-10 DIAGNOSIS — N18.6 ESRD (END STAGE RENAL DISEASE) (H): Primary | ICD-10-CM

## 2019-12-10 DIAGNOSIS — Z76.82 AWAITING ORGAN TRANSPLANT: ICD-10-CM

## 2019-12-10 DIAGNOSIS — Z94.1 HEART REPLACED BY TRANSPLANT (H): ICD-10-CM

## 2019-12-10 DIAGNOSIS — Z76.82 ORGAN TRANSPLANT CANDIDATE: Primary | ICD-10-CM

## 2019-12-10 DIAGNOSIS — D84.9 IMMUNOSUPPRESSION (H): ICD-10-CM

## 2019-12-10 LAB
ABO + RH BLD: NORMAL
ABO + RH BLD: NORMAL
ALBUMIN SERPL-MCNC: 4 G/DL (ref 3.4–5)
ALP SERPL-CCNC: 372 U/L (ref 40–150)
ALT SERPL W P-5'-P-CCNC: 16 U/L (ref 0–70)
ANION GAP SERPL CALCULATED.3IONS-SCNC: 6 MMOL/L (ref 3–14)
AST SERPL W P-5'-P-CCNC: 11 U/L (ref 0–45)
BILIRUB SERPL-MCNC: 1 MG/DL (ref 0.2–1.3)
BLD GP AB SCN SERPL QL: NORMAL
BLOOD BANK CMNT PATIENT-IMP: NORMAL
BUN SERPL-MCNC: 38 MG/DL (ref 7–30)
CALCIUM SERPL-MCNC: 8.6 MG/DL (ref 8.5–10.1)
CHLORIDE SERPL-SCNC: 99 MMOL/L (ref 94–109)
CMV IGG SERPL QL IA: >8 AI (ref 0–0.8)
CMV IGM SERPL QL IA: 0.3 AI (ref 0–0.8)
CO2 SERPL-SCNC: 28 MMOL/L (ref 20–32)
CREAT SERPL-MCNC: 6.2 MG/DL (ref 0.66–1.25)
DEPRECATED CALCIDIOL+CALCIFEROL SERPL-MC: 29 UG/L (ref 20–75)
EBV VCA IGG SER QL IA: >8 AI (ref 0–0.8)
EBV VCA IGM SER QL IA: 0.4 AI (ref 0–0.8)
ERYTHROCYTE [DISTWIDTH] IN BLOOD BY AUTOMATED COUNT: 17 % (ref 10–15)
FERRITIN SERPL-MCNC: 445 NG/ML (ref 26–388)
GFR SERPL CREATININE-BSD FRML MDRD: 9 ML/MIN/{1.73_M2}
GLUCOSE SERPL-MCNC: 120 MG/DL (ref 70–99)
HBA1C MFR BLD: 5.2 % (ref 0–5.6)
HBV CORE AB SERPL QL IA: NONREACTIVE
HBV SURFACE AB SERPL IA-ACNC: 4.71 M[IU]/ML
HBV SURFACE AG SERPL QL IA: NONREACTIVE
HCT VFR BLD AUTO: 35 % (ref 40–53)
HCV AB SERPL QL IA: NONREACTIVE
HGB BLD-MCNC: 11 G/DL (ref 13.3–17.7)
HIV 1+2 AB+HIV1 P24 AG SERPL QL IA: NONREACTIVE
INR PPP: 1.15 (ref 0.86–1.14)
IRON SATN MFR SERPL: 35 % (ref 15–46)
IRON SERPL-MCNC: 84 UG/DL (ref 35–180)
MCH RBC QN AUTO: 33.7 PG (ref 26.5–33)
MCHC RBC AUTO-ENTMCNC: 31.4 G/DL (ref 31.5–36.5)
MCV RBC AUTO: 107 FL (ref 78–100)
PLATELET # BLD AUTO: 254 10E9/L (ref 150–450)
POTASSIUM SERPL-SCNC: 4.5 MMOL/L (ref 3.4–5.3)
PROT SERPL-MCNC: 7.6 G/DL (ref 6.8–8.8)
PTH-INTACT SERPL-MCNC: 821 PG/ML (ref 18–80)
RBC # BLD AUTO: 3.26 10E12/L (ref 4.4–5.9)
SODIUM SERPL-SCNC: 133 MMOL/L (ref 133–144)
SPECIMEN EXP DATE BLD: NORMAL
TIBC SERPL-MCNC: 242 UG/DL (ref 240–430)
WBC # BLD AUTO: 3.1 10E9/L (ref 4–11)

## 2019-12-10 PROCEDURE — 86665 EPSTEIN-BARR CAPSID VCA: CPT | Performed by: SURGERY

## 2019-12-10 PROCEDURE — 86901 BLOOD TYPING SEROLOGIC RH(D): CPT | Performed by: SURGERY

## 2019-12-10 PROCEDURE — 86644 CMV ANTIBODY: CPT | Performed by: SURGERY

## 2019-12-10 PROCEDURE — 85610 PROTHROMBIN TIME: CPT | Performed by: SURGERY

## 2019-12-10 PROCEDURE — 83540 ASSAY OF IRON: CPT | Performed by: SURGERY

## 2019-12-10 PROCEDURE — 86900 BLOOD TYPING SEROLOGIC ABO: CPT | Performed by: SURGERY

## 2019-12-10 PROCEDURE — 85027 COMPLETE CBC AUTOMATED: CPT | Performed by: SURGERY

## 2019-12-10 PROCEDURE — G0463 HOSPITAL OUTPT CLINIC VISIT: HCPCS | Mod: ZF

## 2019-12-10 PROCEDURE — 82306 VITAMIN D 25 HYDROXY: CPT | Performed by: SURGERY

## 2019-12-10 PROCEDURE — 87389 HIV-1 AG W/HIV-1&-2 AB AG IA: CPT | Performed by: SURGERY

## 2019-12-10 PROCEDURE — 83036 HEMOGLOBIN GLYCOSYLATED A1C: CPT | Performed by: SURGERY

## 2019-12-10 PROCEDURE — 86850 RBC ANTIBODY SCREEN: CPT | Performed by: SURGERY

## 2019-12-10 PROCEDURE — 82728 ASSAY OF FERRITIN: CPT | Performed by: SURGERY

## 2019-12-10 PROCEDURE — 86704 HEP B CORE ANTIBODY TOTAL: CPT | Performed by: SURGERY

## 2019-12-10 PROCEDURE — G0499 HEPB SCREEN HIGH RISK INDIV: HCPCS | Performed by: SURGERY

## 2019-12-10 PROCEDURE — G0463 HOSPITAL OUTPT CLINIC VISIT: HCPCS | Mod: 27

## 2019-12-10 PROCEDURE — 36415 COLL VENOUS BLD VENIPUNCTURE: CPT | Performed by: SURGERY

## 2019-12-10 PROCEDURE — 86645 CMV ANTIBODY IGM: CPT | Performed by: SURGERY

## 2019-12-10 PROCEDURE — 83550 IRON BINDING TEST: CPT | Performed by: SURGERY

## 2019-12-10 PROCEDURE — 86706 HEP B SURFACE ANTIBODY: CPT | Performed by: SURGERY

## 2019-12-10 PROCEDURE — G0472 HEP C SCREEN HIGH RISK/OTHER: HCPCS | Performed by: SURGERY

## 2019-12-10 PROCEDURE — 83970 ASSAY OF PARATHORMONE: CPT | Performed by: SURGERY

## 2019-12-10 PROCEDURE — 86825 HLA X-MATH NON-CYTOTOXIC: CPT | Performed by: SURGERY

## 2019-12-10 PROCEDURE — 80053 COMPREHEN METABOLIC PANEL: CPT | Performed by: SURGERY

## 2019-12-10 RX ORDER — CLONIDINE HYDROCHLORIDE 0.1 MG/1
0.1 TABLET ORAL 2 TIMES DAILY
COMMUNITY
Start: 2019-09-24 | End: 2019-12-10

## 2019-12-10 RX ORDER — TRAMADOL HYDROCHLORIDE 50 MG/1
50 TABLET ORAL DAILY PRN
Refills: 0 | COMMUNITY
Start: 2019-06-16 | End: 2019-12-10

## 2019-12-10 ASSESSMENT — MIFFLIN-ST. JEOR
SCORE: 1478.5
SCORE: 1478.37

## 2019-12-10 ASSESSMENT — PAIN SCALES - GENERAL: PAINLEVEL: NO PAIN (0)

## 2019-12-10 NOTE — LETTER
12/10/2019      RE: Murray Nicholson  [address]  [place] [address]       See surgeon note     Maliha Jesus, NP

## 2019-12-10 NOTE — LETTER
12/10/2019       RE: Murray Nicholson  [address]  [place] [address]     Dear Colleague,    Thank you for referring your patient, Murray Nicholson, to the [facility] NEPHROLOGY at [facility]. Please see a copy of my visit note below.    TRANSPLANT NEPHROLOGY PRE-OP HISTORY AND PHYSICAL    Assessment & Plan   # ESKD from diabetes mellitus type 2: Patient is on renal replacement therapy and would benefit from a kidney transplant.  Patient is medically maximized for surgery.  Living donor kidney transplant is tentatively scheduled for Dec 19, 2019.    # Dialysis Plan: Last HD will be on Tuesday, December 17. Changes in dialysis prescription is none.    # Immunosuppression Plan: Will discuss induction with Transplant Team.  Otherwise, will plan to continue tacrolimus and mycophenolate mofetil for maintenance immunosuppression..    # Heart Transplant: Appears to be doing well on tacrolimus and mycophenolate mofetil.  Followed closely by Cardiology.     # Cardiovascular/Vascular Risk and Issues:   Known CAD: Yes, but s/p Heart Transplant   Cardiac Evaluation: Coronary angiogram and heart transplant biopsy 10/28/19   Known PAD: Yes    # Hypertension: Borderline control; Goal BP: < 140/90   - Changes: Yes - Would recommend changing amlodipine to nightly dosing.    # Diabetes: Controlled (HbA1c <7%) Last HbA1c: 5.2%   - Management as per primary care.    # Anemia in Chronic Renal Disease: Hgb: Stable      ANASTASIYA: Yes   - Iron studies: Replete    # MGUS: Normal kappa/lambda ratio on last check with stable peak of 0.4.    # SHEELA on CPAP: Recommend patient bring CPAP machine to continue use in the hospital.    # Medication Recommendations Prior to Surgical Date: Patient should stay on present medications as prescribed except as noted below:  - Day Prior to Surgery: Take usual medications, except no evening lisinopril.  - Day of Surgery: Take ONLY hydralazine, tacrolimus and  mycophenolate mofetil      Assessment and plan was discussed with the patient and he voiced his understanding and agreement.    For any questions prior to surgery, please call the Transplant Office: [phone]    Giovany Quijano MD    Chief Complaint   Mr. Nicholson is a 64 year old here for pre-op H&P prior to planned LDKT.    History of Present Illness    Mr. Nicholson reports feeling good overall with some medical complaints.  Patient has ESKD secondary to presumed diabetic nephropathy and hypertension.  Patient has a long-standing history of DM type 2, diagnosed around 2000.  He had progressively worsening kidney function and was evaluated for a kidney transplant in August 2015 and was placed on the kidney transplant waiting list in 2016.  Patient's kidney dysfunction worsened and patient was initiated on HD July 2017.  During this time, he also had known CAD with bicuspid aortic valve, mild to moderate aortic insufficiency, congestive heart failure and ascending aortic aneurysm.  Patient's ischemic cardiomyopathy progressed and he underwent heart transplant 6/14/18.  Patient's cardiac function has been stable on tacrolimus and mycophenolate mofetil.  He presently dialyzes THS at [place] dialysis unit via a LUE AV graft.  His EDW ~ 71.5 kg.  Patient now presents for planned LDKT 12/19/19.    No recent hospitalizations or new medical issues.  His energy level is good and has been stable.  He is active and does get some exercise.  Denies any chest pain or shortness of breath with exertion.  Appetite is good and weight has been stable over the last 6 months or so.  However, he was previously overweight and has lost about 75 lbs in the last couple of years.  He lost the weight by eating healthier and more exercise.  No nausea, vomiting or diarrhea.  No fever, sweats or chills.  No leg swelling.    Recent Hospitalizations:  [x] No [] Yes    New Medical Issues: [x] No [] Yes    Decreased energy: [x] No []  Yes    Chest pain or SOB with exertion:  [x] No [] Yes    Appetite change or weight change: [x] No [] Yes    Nausea, vomiting or diarrhea:  [x] No [] Yes    Fever, sweats or chills: [x] No [] Yes    Leg swelling: [x] No [] Yes      Home BP: 140-150/70-80s    Review of Systems   A comprehensive review of systems was obtained and negative, except as noted in the HPI or PMH.    Problem List   Patient Active Problem List   Diagnosis     Esophageal reflux     Allergic rhinitis     CKD (chronic kidney disease) stage 5, GFR less than 15 ml/min (H)     Hypertension goal BP (blood pressure) < 130/80     Anemia of chronic disease     Dyslipidemia     MGUS (monoclonal gammopathy of unknown significance)     Ascending aortic aneurysm (H)     Anemia, iron deficiency     Anemia in stage 5 chronic kidney disease (H)     Heart transplanted (H)     Leukopenia     Sigmoid diverticulitis     Heart replaced by transplant (H)     Aortic stenosis     Status post coronary angiogram     ESRD (end stage renal disease) (H)     Immunosuppression (H)     Type 2 diabetes mellitus (H)     Pancreatic cyst       Social History   Social History     Tobacco Use     Smoking status: Former Smoker     Packs/day: 1.00     Years: 20.00     Pack years: 20.00     Types: Cigarettes     Start date: 1984     Last attempt to quit: 1994     Years since quittin.3     Smokeless tobacco: Never Used   Substance Use Topics     Alcohol use: No     Alcohol/week: 0.0 standard drinks     Drug use: No       Allergies   Allergies   Allergen Reactions     Norco [Hydrocodone-Acetaminophen] Nausea and Vomiting     Cats      Throat tightness     Isosorbide Other (See Comments)     hypotension     Penicillins Hives     Seasonal Allergies      rhinitis     Shrimp      Throat closes        Medications   Current Outpatient Medications   Medication Sig     acetaminophen (TYLENOL) 500 MG tablet Take 2 tablets (1,000 mg) by mouth 4 times daily (Patient taking  differently: Take 1,000 mg by mouth every 6 hours as needed )     ALBUTEROL IN Inhale into the lungs daily as needed     amLODIPine (NORVASC) 10 MG tablet Take 1 tablet (10 mg) by mouth daily     aspirin 81 MG EC tablet Take 81 mg by mouth daily     atorvastatin (LIPITOR) 40 MG tablet Take 1 tablet (40 mg) by mouth daily     biotin (BIOTIN 5000) 5 MG CAPS Take 5 mg by mouth daily     fluticasone (FLONASE) 50 MCG/ACT nasal spray Spray 1 spray into both nostrils daily (Patient taking differently: Spray 1 spray into both nostrils daily as needed )     hydrALAZINE (APRESOLINE) 100 MG TABS tablet Take 1 tablet (100 mg) by mouth every 8 hours     lidocaine-prilocaine (EMLA) 2.5-2.5 % external cream apply 1 hour prior to dialysis     lisinopril (PRINIVIL/ZESTRIL) 10 MG tablet Take ONE 5 mg tab with ONE 10 mg tab daily (to equal 15 mg daily). (Patient taking differently: 15 mg Take ONE 5 mg tab with ONE 10 mg tab daily (to equal 15 mg daily).)     lisinopril (PRINIVIL/ZESTRIL) 5 MG tablet Take ONE 5 mg tab with ONE 10 mg tab daily (to equal 15 mg daily).     melatonin 1 MG TABS tablet Take 2 tablets (2 mg) by mouth nightly as needed for sleep     mupirocin (BACTROBAN) 2 % external ointment Apply topically 3 times daily     mycophenolate (GENERIC EQUIVALENT) 250 MG capsule Take 2 capsules (500 mg) by mouth 2 times daily     NEPHROCAPS 1 MG capsule Take 1 capsule by mouth daily     pantoprazole (PROTONIX) 40 MG EC tablet Take 1 tablet (40 mg) by mouth 2 times daily (before meals)     tacrolimus (GENERIC EQUIVALENT) 1 MG capsule Take two capsules in the AM (2 mg) and two capsules in the PM (2 mg)     Current Facility-Administered Medications   Medication     lidocaine (PF) (XYLOCAINE) 1 % injection 1 mL     Facility-Administered Medications Ordered in Other Visits   Medication     diatrizoate meglumine-sodium (GASTROGRAFIN/GASTROVIEW) 66-10 % solution 480 mL     Medications Discontinued During This Encounter   Medication  Reason     valACYclovir (VALTREX) 1000 mg tablet      traMADol (ULTRAM) 50 MG tablet      loratadine (CLARITIN) 10 MG tablet      cloNIDine (CATAPRES) 0.1 MG tablet        Physical Exam   Vital Signs: There were no vitals taken for this visit.    GENERAL APPEARANCE: alert and no distress  HENT: mouth without ulcers or lesions  LYMPHATICS: no cervical or supraclavicular nodes  RESP: lungs clear to auscultation - no rales, rhonchi or wheezes  CV: regular rhythm, normal rate, no rub, no murmur  EDEMA: no LE edema bilaterally  ABDOMEN: soft, nondistended, nontender, bowel sounds normal  MS: extremities normal - no gross deformities noted, no evidence of inflammation in joints, no muscle tenderness  SKIN: no rash  DIALYSIS ACCESS:  LUE AV Graft with good thrill      Data     Renal Latest Ref Rng & Units 12/10/2019 12/4/2019 11/22/2019   Na 133 - 144 mmol/L 133 136 138   K 3.4 - 5.3 mmol/L 4.5 4.5 4.2   Cl 94 - 109 mmol/L 99 103 106   CO2 20 - 32 mmol/L 28 25 24   BUN 7 - 30 mg/dL 38(H) 41(H) 30   Cr 0.66 - 1.25 mg/dL 6.20(H) 6.26(H) 6.12(H)   Glucose 70 - 99 mg/dL 120(H) 149(H) 100(H)   Ca  8.5 - 10.1 mg/dL 8.6 8.7 8.4(L)   Mg 1.6 - 2.3 mg/dL - 1.6 -     Bone Health Latest Ref Rng & Units 12/10/2019 12/4/2019 11/13/2019   Phos 2.5 - 4.5 mg/dL - 2.6 2.8   PTHi 18 - 80 pg/mL 821(H) - -   Vit D Def 20 - 75 ug/L 29 - -     Heme Latest Ref Rng & Units 12/10/2019 12/4/2019 11/22/2019   WBC 4.0 - 11.0 10e9/L 3.1(L) 3.3(L) 3.1(L)   Hgb 13.3 - 17.7 g/dL 11.0(L) 10.8(L) 10.0(L)   Plt 150 - 450 10e9/L 254 206 234     Liver Latest Ref Rng & Units 12/10/2019 10/28/2019 6/7/2019   AP 40 - 150 U/L 372(H) 318(H) 278(H)   TBili 0.2 - 1.3 mg/dL 1.0 0.7 1.1   DBili 0.0 - 0.2 mg/dL - - -   ALT 0 - 70 U/L 16 14 19   AST 0 - 45 U/L 11 11 14   Tot Protein 6.8 - 8.8 g/dL 7.6 7.6 7.0   Albumin 3.4 - 5.0 g/dL 4.0 3.8 3.4     Pancreas Latest Ref Rng & Units 12/10/2019 9/23/2019 6/10/2019   A1C 0 - 5.6 % 5.2 5.6 5.2   Amylase 30 - 110 U/L - - -      Iron studies Latest Ref Rng & Units 12/10/2019 6/10/2019 7/10/2018   Iron 35 - 180 ug/dL 84 118 66   Iron sat 15 - 46 % 35 47(H) 28   Ferritin 26 - 388 ng/mL 445(H) 644(H) 771(H)     [facility] Txp Virology Latest Ref Rng & Units 12/10/2019 10/28/2019 6/7/2019   CVM DNA Quant - - EDTA PLASMA Plasma, EDTA anticoagulant   Hep B Core NR:Nonreactive Nonreactive - Nonreactive        Recent Labs   Lab Test 11/04/19  0833 11/13/19  0859 12/04/19  0908   DOSTAC 8:30PM 11 10P 11/12/19 0830pm   TACROL 10.4 6.5 7.0     Recent Labs   Lab Test 12/12/18  0612   DOSMPA Not Provided   MPACID [id number]*   MPAG 144.3*       Again, thank you for allowing me to participate in the care of your patient.      Sincerely,    Kidney/Pancreas Recipient

## 2019-12-10 NOTE — NURSING NOTE
"Chief Complaint   Patient presents with     RECHECK     day minus 9 recip     Blood pressure 128/73, pulse 87, temperature 100  F (37.8  C), height 1.727 m (5' 7.99\"), weight 71.4 kg (157 lb 6.5 oz), SpO2 100 %.    Kaylen Rodriges on 12/10/2019 at 11:54 AM    "

## 2019-12-10 NOTE — LETTER
12/10/2019      RE: Murray Nicholson  [address]  [place] [address]       TRANSPLANT NEPHROLOGY PRE-OP HISTORY AND PHYSICAL    Assessment & Plan   # ESKD from diabetes mellitus type 2: Patient is on renal replacement therapy and would benefit from a kidney transplant.  Patient is medically maximized for surgery.  Living donor kidney transplant is tentatively scheduled for Dec 19, 2019.    # Dialysis Plan: Last HD will be on Tuesday, December 17. Changes in dialysis prescription is none.    # Immunosuppression Plan: Will discuss induction with Transplant Team.  Otherwise, will plan to continue tacrolimus and mycophenolate mofetil for maintenance immunosuppression..    # Heart Transplant: Appears to be doing well on tacrolimus and mycophenolate mofetil.  Followed closely by Cardiology.     # Cardiovascular/Vascular Risk and Issues:   Known CAD: Yes, but s/p Heart Transplant   Cardiac Evaluation: Coronary angiogram and heart transplant biopsy 10/28/19   Known PAD: Yes    # Hypertension: Borderline control; Goal BP: < 140/90   - Changes: Yes - Would recommend changing amlodipine to nightly dosing.    # Diabetes: Controlled (HbA1c <7%) Last HbA1c: 5.2%   - Management as per primary care.    # Anemia in Chronic Renal Disease: Hgb: Stable      ANASTASIYA: Yes   - Iron studies: Replete    # MGUS: Normal kappa/lambda ratio on last check with stable peak of 0.4.    # SHEELA on CPAP: Recommend patient bring CPAP machine to continue use in the hospital.    # Medication Recommendations Prior to Surgical Date: Patient should stay on present medications as prescribed except as noted below:  - Day Prior to Surgery: Take usual medications, except no evening lisinopril.  - Day of Surgery: Take ONLY hydralazine, tacrolimus and mycophenolate mofetil      Assessment and plan was discussed with the patient and he voiced his understanding and agreement.    For any questions prior to surgery, please call the Transplant Office:  [phone]    Giovany Quijano MD    Chief Complaint   Mr. Nicholson is a 64 year old here for pre-op H&P prior to planned LDKT.    History of Present Illness    Mr. Nicholson reports feeling good overall with some medical complaints.  Patient has ESKD secondary to presumed diabetic nephropathy and hypertension.  Patient has a long-standing history of DM type 2, diagnosed around 2000.  He had progressively worsening kidney function and was evaluated for a kidney transplant in August 2015 and was placed on the kidney transplant waiting list in 2016.  Patient's kidney dysfunction worsened and patient was initiated on HD July 2017.  During this time, he also had known CAD with bicuspid aortic valve, mild to moderate aortic insufficiency, congestive heart failure and ascending aortic aneurysm.  Patient's ischemic cardiomyopathy progressed and he underwent heart transplant 6/14/18.  Patient's cardiac function has been stable on tacrolimus and mycophenolate mofetil.  He presently dialyzes THS at [facility] unit via a LUE AV graft.  His EDW ~ 71.5 kg.  Patient now presents for planned LDKT 12/19/19.    No recent hospitalizations or new medical issues.  His energy level is good and has been stable.  He is active and does get some exercise.  Denies any chest pain or shortness of breath with exertion.  Appetite is good and weight has been stable over the last 6 months or so.  However, he was previously overweight and has lost about 75 lbs in the last couple of years.  He lost the weight by eating healthier and more exercise.  No nausea, vomiting or diarrhea.  No fever, sweats or chills.  No leg swelling.    Recent Hospitalizations:  [x] No [] Yes    New Medical Issues: [x] No [] Yes    Decreased energy: [x] No [] Yes    Chest pain or SOB with exertion:  [x] No [] Yes    Appetite change or weight change: [x] No [] Yes    Nausea, vomiting or diarrhea:  [x] No [] Yes    Fever, sweats or chills: [x] No [] Yes     Leg swelling: [x] No [] Yes      Home BP: 140-150/70-80s    Review of Systems   A comprehensive review of systems was obtained and negative, except as noted in the HPI or PMH.    Problem List   Patient Active Problem List   Diagnosis     Esophageal reflux     Allergic rhinitis     CKD (chronic kidney disease) stage 5, GFR less than 15 ml/min (H)     Hypertension goal BP (blood pressure) < 130/80     Anemia of chronic disease     Dyslipidemia     MGUS (monoclonal gammopathy of unknown significance)     Ascending aortic aneurysm (H)     Anemia, iron deficiency     Anemia in stage 5 chronic kidney disease (H)     Heart transplanted (H)     Leukopenia     Sigmoid diverticulitis     Heart replaced by transplant (H)     Aortic stenosis     Status post coronary angiogram     ESRD (end stage renal disease) (H)     Immunosuppression (H)     Type 2 diabetes mellitus (H)     Pancreatic cyst       Social History   Social History     Tobacco Use     Smoking status: Former Smoker     Packs/day: 1.00     Years: 20.00     Pack years: 20.00     Types: Cigarettes     Start date: 1984     Last attempt to quit: 1994     Years since quittin.3     Smokeless tobacco: Never Used   Substance Use Topics     Alcohol use: No     Alcohol/week: 0.0 standard drinks     Drug use: No       Allergies   Allergies   Allergen Reactions     Norco [Hydrocodone-Acetaminophen] Nausea and Vomiting     Cats      Throat tightness     Isosorbide Other (See Comments)     hypotension     Penicillins Hives     Seasonal Allergies      rhinitis     Shrimp      Throat closes        Medications   Current Outpatient Medications   Medication Sig     acetaminophen (TYLENOL) 500 MG tablet Take 2 tablets (1,000 mg) by mouth 4 times daily (Patient taking differently: Take 1,000 mg by mouth every 6 hours as needed )     ALBUTEROL IN Inhale into the lungs daily as needed     amLODIPine (NORVASC) 10 MG tablet Take 1 tablet (10 mg) by mouth daily     aspirin 81  MG EC tablet Take 81 mg by mouth daily     atorvastatin (LIPITOR) 40 MG tablet Take 1 tablet (40 mg) by mouth daily     biotin (BIOTIN 5000) 5 MG CAPS Take 5 mg by mouth daily     fluticasone (FLONASE) 50 MCG/ACT nasal spray Spray 1 spray into both nostrils daily (Patient taking differently: Spray 1 spray into both nostrils daily as needed )     hydrALAZINE (APRESOLINE) 100 MG TABS tablet Take 1 tablet (100 mg) by mouth every 8 hours     lidocaine-prilocaine (EMLA) 2.5-2.5 % external cream apply 1 hour prior to dialysis     lisinopril (PRINIVIL/ZESTRIL) 10 MG tablet Take ONE 5 mg tab with ONE 10 mg tab daily (to equal 15 mg daily). (Patient taking differently: 15 mg Take ONE 5 mg tab with ONE 10 mg tab daily (to equal 15 mg daily).)     lisinopril (PRINIVIL/ZESTRIL) 5 MG tablet Take ONE 5 mg tab with ONE 10 mg tab daily (to equal 15 mg daily).     melatonin 1 MG TABS tablet Take 2 tablets (2 mg) by mouth nightly as needed for sleep     mupirocin (BACTROBAN) 2 % external ointment Apply topically 3 times daily     mycophenolate (GENERIC EQUIVALENT) 250 MG capsule Take 2 capsules (500 mg) by mouth 2 times daily     NEPHROCAPS 1 MG capsule Take 1 capsule by mouth daily     pantoprazole (PROTONIX) 40 MG EC tablet Take 1 tablet (40 mg) by mouth 2 times daily (before meals)     tacrolimus (GENERIC EQUIVALENT) 1 MG capsule Take two capsules in the AM (2 mg) and two capsules in the PM (2 mg)     Current Facility-Administered Medications   Medication     lidocaine (PF) (XYLOCAINE) 1 % injection 1 mL     Facility-Administered Medications Ordered in Other Visits   Medication     diatrizoate meglumine-sodium (GASTROGRAFIN/GASTROVIEW) 66-10 % solution 480 mL     Medications Discontinued During This Encounter   Medication Reason     valACYclovir (VALTREX) 1000 mg tablet      traMADol (ULTRAM) 50 MG tablet      loratadine (CLARITIN) 10 MG tablet      cloNIDine (CATAPRES) 0.1 MG tablet        Physical Exam   Vital Signs: There were  no vitals taken for this visit.    GENERAL APPEARANCE: alert and no distress  HENT: mouth without ulcers or lesions  LYMPHATICS: no cervical or supraclavicular nodes  RESP: lungs clear to auscultation - no rales, rhonchi or wheezes  CV: regular rhythm, normal rate, no rub, no murmur  EDEMA: no LE edema bilaterally  ABDOMEN: soft, nondistended, nontender, bowel sounds normal  MS: extremities normal - no gross deformities noted, no evidence of inflammation in joints, no muscle tenderness  SKIN: no rash  DIALYSIS ACCESS:  LUE AV Graft with good thrill      Data     Renal Latest Ref Rng & Units 12/10/2019 12/4/2019 11/22/2019   Na 133 - 144 mmol/L 133 136 138   K 3.4 - 5.3 mmol/L 4.5 4.5 4.2   Cl 94 - 109 mmol/L 99 103 106   CO2 20 - 32 mmol/L 28 25 24   BUN 7 - 30 mg/dL 38(H) 41(H) 30   Cr 0.66 - 1.25 mg/dL 6.20(H) 6.26(H) 6.12(H)   Glucose 70 - 99 mg/dL 120(H) 149(H) 100(H)   Ca  8.5 - 10.1 mg/dL 8.6 8.7 8.4(L)   Mg 1.6 - 2.3 mg/dL - 1.6 -     Bone Health Latest Ref Rng & Units 12/10/2019 12/4/2019 11/13/2019   Phos 2.5 - 4.5 mg/dL - 2.6 2.8   PTHi 18 - 80 pg/mL 821(H) - -   Vit D Def 20 - 75 ug/L 29 - -     Heme Latest Ref Rng & Units 12/10/2019 12/4/2019 11/22/2019   WBC 4.0 - 11.0 10e9/L 3.1(L) 3.3(L) 3.1(L)   Hgb 13.3 - 17.7 g/dL 11.0(L) 10.8(L) 10.0(L)   Plt 150 - 450 10e9/L 254 206 234     Liver Latest Ref Rng & Units 12/10/2019 10/28/2019 6/7/2019   AP 40 - 150 U/L 372(H) 318(H) 278(H)   TBili 0.2 - 1.3 mg/dL 1.0 0.7 1.1   DBili 0.0 - 0.2 mg/dL - - -   ALT 0 - 70 U/L 16 14 19   AST 0 - 45 U/L 11 11 14   Tot Protein 6.8 - 8.8 g/dL 7.6 7.6 7.0   Albumin 3.4 - 5.0 g/dL 4.0 3.8 3.4     Pancreas Latest Ref Rng & Units 12/10/2019 9/23/2019 6/10/2019   A1C 0 - 5.6 % 5.2 5.6 5.2   Amylase 30 - 110 U/L - - -     Iron studies Latest Ref Rng & Units 12/10/2019 6/10/2019 7/10/2018   Iron 35 - 180 ug/dL 84 118 66   Iron sat 15 - 46 % 35 47(H) 28   Ferritin 26 - 388 ng/mL 445(H) 644(H) 771(H)     [facility] Txp Virology Latest Ref  Rng & Units 12/10/2019 10/28/2019 6/7/2019   CVM DNA Quant - - EDTA PLASMA Plasma, EDTA anticoagulant   Hep B Core NR:Nonreactive Nonreactive - Nonreactive        Recent Labs   Lab Test 11/04/19  0833 11/13/19  0859 12/04/19  0908   DOSTAC 8:30PM 11 10P 11/12/19 0830pm   TACROL 10.4 6.5 7.0     Recent Labs   Lab Test 12/12/18  0612   DOSMPA Not Provided   MPACID 4.23*   MPAG 144.3*     Giovany Quijano MD

## 2019-12-10 NOTE — LETTER
12/10/2019       RE: Murray Nicholson  [address]  [place] [address]     Dear Colleague,    Thank you for referring your patient, Murray Nicholson, to the [facility] SOLID ORGAN TRANSPLANT at [facility]. Please see a copy of my visit note below.    See surgeon note     Again, thank you for allowing me to participate in the care of your patient.      Sincerely,    Maliha Jesus NP
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
## 2019-12-10 NOTE — NURSING NOTE
"Chief Complaint   Patient presents with     Transplant     Day -9 recip     Blood pressure 128/73, pulse 87, height 1.727 m (5' 8\"), weight 71.4 kg (157 lb 6.5 oz), SpO2 100 %.    Roxana Urbina CMA    "
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
## 2019-12-10 NOTE — PATIENT INSTRUCTIONS
Change amlodipine to 10 mg nightly.    Last dialysis prior to surgery will be Tuesday, December 17.    Take usual medications day prior to surgery, except no evening lisinopril.    Day of surgery take ONLY hydralazine, tacrolimus and mycophenolate mofetil.

## 2019-12-11 ENCOUNTER — TELEPHONE (OUTPATIENT)
Dept: TRANSPLANT | Facility: CLINIC | Age: 64
End: 2019-12-11

## 2019-12-11 ENCOUNTER — COMMITTEE REVIEW (OUTPATIENT)
Dept: TRANSPLANT | Facility: CLINIC | Age: 64
End: 2019-12-11

## 2019-12-11 NOTE — COMMITTEE REVIEW
Abdominal Patient Discussion Note Transplant Coordinator: Britni Hatch  Transplant Surgeon:       Referring Physician:     Committee Review Members:  Nephrology Lynne Sahni PA-C, Bobby Escobar MD, Des Frazier, APRN CNP, Gilberto Ulloa MD   Nurse Henna Lazaro, TONY   Nutrition Scarlet Bowden, RD   Pharmacist Antony Shearer, [facility]    - Clinical Sil Perez, [facility] Laura Roman, [facility]   Transplant Lynne Sahni PA-C, Radha Jaramillo, TONY, Joann Orozco, TONY, Britni Hatch, RN, Altagracia Simpson, RN, Jodee St, RN, Moraima Blackwell, RN   Transplant Surgery Shira Campbell MD, MD       Additional Discussion Notes and Findings: Scheduled LDKT scheduled for 12/19/19 discussed. No outstanding items for recipient to complete.

## 2019-12-11 NOTE — TELEPHONE ENCOUNTER
Pt called with OR check-in time questions. Reviewed OR start time is set for 1600 so check-in between 1 & 2 pm should be sufficient per donor coordinator. Pt states he will aim to check-in at 1300 for transplant scheduled for 12/19/19

## 2019-12-12 LAB
DONOR IDENTIFICATION: NORMAL
DSA COMMENTS: NORMAL
DSA PRESENT: NO
DSA TEST METHOD: NORMAL
INTERPRETATION ECG - MUSE: NORMAL
ORGAN: NORMAL

## 2019-12-13 LAB
CELL TYPE AUTO: NORMAL
CHANNELSHIFTAUTOB1: -41
CHANNELSHIFTAUTOT1: -29
CROSSMATCHDATEAUTO: NORMAL
DONOR AUTO: NORMAL
DONORCELLDATE AUTO: NORMAL
POS CUT OFF AUTO B: >93
POS CUT OFF AUTO T: >79
PROTOCOL CUTOFF: NORMAL
RESULT AUTO B1: NORMAL
RESULT AUTO T1: NORMAL
SA1 CELL: NORMAL
SA1 COMMENTS: NORMAL
SA1 HI RISK ABY: NORMAL
SA1 MOD RISK ABY: NORMAL
SA1 TEST METHOD: NORMAL
SA2 CELL: NORMAL
SA2 COMMENTS: NORMAL
SA2 HI RISK ABY UA: NORMAL
SA2 MOD RISK ABY: NORMAL
SA2 TEST METHOD: NORMAL
SERUM DATE AUTO B1: NORMAL
SERUM DATE AUTO T1: NORMAL
TESTMETHODAUTO: NORMAL
TREATMENT AUTO B1: NORMAL
TREATMENT AUTO T1: NORMAL
UNACCEPTABLE ANTIGEN: NORMAL
UNOS CPRA: 0

## 2019-12-16 PROBLEM — Z94.1 HEART TRANSPLANT RECIPIENT (H): Status: RESOLVED | Noted: 2018-07-26 | Resolved: 2019-12-16

## 2019-12-16 NOTE — PROGRESS NOTES
TRANSPLANT NEPHROLOGY PRE-OP HISTORY AND PHYSICAL    Assessment & Plan   # ESKD from diabetes mellitus type 2: Patient is on renal replacement therapy and would benefit from a kidney transplant.  Patient is medically maximized for surgery.  Living donor kidney transplant is tentatively scheduled for Dec 19, 2019.    # Dialysis Plan: Last HD will be on Tuesday, December 17. Changes in dialysis prescription is none.    # Immunosuppression Plan: Will discuss induction with Transplant Team.  Otherwise, will plan to continue tacrolimus and mycophenolate mofetil for maintenance immunosuppression..    # Heart Transplant: Appears to be doing well on tacrolimus and mycophenolate mofetil.  Followed closely by Cardiology.     # Cardiovascular/Vascular Risk and Issues:   Known CAD: Yes, but s/p Heart Transplant   Cardiac Evaluation: Coronary angiogram and heart transplant biopsy 10/28/19   Known PAD: Yes    # Hypertension: Borderline control; Goal BP: < 140/90   - Changes: Yes - Would recommend changing amlodipine to nightly dosing.    # Diabetes: Controlled (HbA1c <7%) Last HbA1c: 5.2%   - Management as per primary care.    # Anemia in Chronic Renal Disease: Hgb: Stable      ANASTASIYA: Yes   - Iron studies: Replete    # MGUS: Normal kappa/lambda ratio on last check with stable peak of 0.4.    # SHEELA on CPAP: Recommend patient bring CPAP machine to continue use in the hospital.    # Medication Recommendations Prior to Surgical Date: Patient should stay on present medications as prescribed except as noted below:  - Day Prior to Surgery: Take usual medications, except no evening lisinopril.  - Day of Surgery: Take ONLY hydralazine, tacrolimus and mycophenolate mofetil      Assessment and plan was discussed with the patient and he voiced his understanding and agreement.    For any questions prior to surgery, please call the Transplant Office: [phone]    Giovany Quijano MD    Chief Complaint   Mr. Nicholson is a 64 year old here  for pre-op H&P prior to planned LDKT.    History of Present Illness    Mr. Nicholson reports feeling good overall with some medical complaints.  Patient has ESKD secondary to presumed diabetic nephropathy and hypertension.  Patient has a long-standing history of DM type 2, diagnosed around 2000.  He had progressively worsening kidney function and was evaluated for a kidney transplant in August 2015 and was placed on the kidney transplant waiting list in 2016.  Patient's kidney dysfunction worsened and patient was initiated on HD July 2017.  During this time, he also had known CAD with bicuspid aortic valve, mild to moderate aortic insufficiency, congestive heart failure and ascending aortic aneurysm.  Patient's ischemic cardiomyopathy progressed and he underwent heart transplant 6/14/18.  Patient's cardiac function has been stable on tacrolimus and mycophenolate mofetil.  He presently dialyzes THS at [facility] via a LUE AV graft.  His EDW ~ 71.5 kg.  Patient now presents for planned LDKT 12/19/19.    No recent hospitalizations or new medical issues.  His energy level is good and has been stable.  He is active and does get some exercise.  Denies any chest pain or shortness of breath with exertion.  Appetite is good and weight has been stable over the last 6 months or so.  However, he was previously overweight and has lost about 75 lbs in the last couple of years.  He lost the weight by eating healthier and more exercise.  No nausea, vomiting or diarrhea.  No fever, sweats or chills.  No leg swelling.    Recent Hospitalizations:  [x] No [] Yes    New Medical Issues: [x] No [] Yes    Decreased energy: [x] No [] Yes    Chest pain or SOB with exertion:  [x] No [] Yes    Appetite change or weight change: [x] No [] Yes    Nausea, vomiting or diarrhea:  [x] No [] Yes    Fever, sweats or chills: [x] No [] Yes    Leg swelling: [x] No [] Yes      Home BP: 140-150/70-80s    Review of Systems   A comprehensive  review of systems was obtained and negative, except as noted in the HPI or PMH.    Problem List   Patient Active Problem List   Diagnosis     Esophageal reflux     Allergic rhinitis     CKD (chronic kidney disease) stage 5, GFR less than 15 ml/min (H)     Hypertension goal BP (blood pressure) < 130/80     Anemia of chronic disease     Dyslipidemia     MGUS (monoclonal gammopathy of unknown significance)     Ascending aortic aneurysm (H)     Anemia, iron deficiency     Anemia in stage 5 chronic kidney disease (H)     Heart transplanted (H)     Leukopenia     Sigmoid diverticulitis     Heart replaced by transplant (H)     Aortic stenosis     Status post coronary angiogram     ESRD (end stage renal disease) (H)     Immunosuppression (H)     Type 2 diabetes mellitus (H)     Pancreatic cyst       Social History   Social History     Tobacco Use     Smoking status: Former Smoker     Packs/day: 1.00     Years: 20.00     Pack years: 20.00     Types: Cigarettes     Start date: 1984     Last attempt to quit: 1994     Years since quittin.3     Smokeless tobacco: Never Used   Substance Use Topics     Alcohol use: No     Alcohol/week: 0.0 standard drinks     Drug use: No       Allergies   Allergies   Allergen Reactions     Norco [Hydrocodone-Acetaminophen] Nausea and Vomiting     Cats      Throat tightness     Isosorbide Other (See Comments)     hypotension     Penicillins Hives     Seasonal Allergies      rhinitis     Shrimp      Throat closes        Medications   Current Outpatient Medications   Medication Sig     acetaminophen (TYLENOL) 500 MG tablet Take 2 tablets (1,000 mg) by mouth 4 times daily (Patient taking differently: Take 1,000 mg by mouth every 6 hours as needed )     ALBUTEROL IN Inhale into the lungs daily as needed     amLODIPine (NORVASC) 10 MG tablet Take 1 tablet (10 mg) by mouth daily     aspirin 81 MG EC tablet Take 81 mg by mouth daily     atorvastatin (LIPITOR) 40 MG tablet Take 1 tablet (40  mg) by mouth daily     biotin (BIOTIN 5000) 5 MG CAPS Take 5 mg by mouth daily     fluticasone (FLONASE) 50 MCG/ACT nasal spray Spray 1 spray into both nostrils daily (Patient taking differently: Spray 1 spray into both nostrils daily as needed )     hydrALAZINE (APRESOLINE) 100 MG TABS tablet Take 1 tablet (100 mg) by mouth every 8 hours     lidocaine-prilocaine (EMLA) 2.5-2.5 % external cream apply 1 hour prior to dialysis     lisinopril (PRINIVIL/ZESTRIL) 10 MG tablet Take ONE 5 mg tab with ONE 10 mg tab daily (to equal 15 mg daily). (Patient taking differently: 15 mg Take ONE 5 mg tab with ONE 10 mg tab daily (to equal 15 mg daily).)     lisinopril (PRINIVIL/ZESTRIL) 5 MG tablet Take ONE 5 mg tab with ONE 10 mg tab daily (to equal 15 mg daily).     melatonin 1 MG TABS tablet Take 2 tablets (2 mg) by mouth nightly as needed for sleep     mupirocin (BACTROBAN) 2 % external ointment Apply topically 3 times daily     mycophenolate (GENERIC EQUIVALENT) 250 MG capsule Take 2 capsules (500 mg) by mouth 2 times daily     NEPHROCAPS 1 MG capsule Take 1 capsule by mouth daily     pantoprazole (PROTONIX) 40 MG EC tablet Take 1 tablet (40 mg) by mouth 2 times daily (before meals)     tacrolimus (GENERIC EQUIVALENT) 1 MG capsule Take two capsules in the AM (2 mg) and two capsules in the PM (2 mg)     Current Facility-Administered Medications   Medication     lidocaine (PF) (XYLOCAINE) 1 % injection 1 mL     Facility-Administered Medications Ordered in Other Visits   Medication     diatrizoate meglumine-sodium (GASTROGRAFIN/GASTROVIEW) 66-10 % solution 480 mL     Medications Discontinued During This Encounter   Medication Reason     valACYclovir (VALTREX) 1000 mg tablet      traMADol (ULTRAM) 50 MG tablet      loratadine (CLARITIN) 10 MG tablet      cloNIDine (CATAPRES) 0.1 MG tablet        Physical Exam   Vital Signs: There were no vitals taken for this visit.    GENERAL APPEARANCE: alert and no distress  HENT: mouth without  ulcers or lesions  LYMPHATICS: no cervical or supraclavicular nodes  RESP: lungs clear to auscultation - no rales, rhonchi or wheezes  CV: regular rhythm, normal rate, no rub, no murmur  EDEMA: no LE edema bilaterally  ABDOMEN: soft, nondistended, nontender, bowel sounds normal  MS: extremities normal - no gross deformities noted, no evidence of inflammation in joints, no muscle tenderness  SKIN: no rash  DIALYSIS ACCESS:  LUE AV Graft with good thrill      Data     Renal Latest Ref Rng & Units 12/10/2019 12/4/2019 11/22/2019   Na 133 - 144 mmol/L 133 136 138   K 3.4 - 5.3 mmol/L 4.5 4.5 4.2   Cl 94 - 109 mmol/L 99 103 106   CO2 20 - 32 mmol/L 28 25 24   BUN 7 - 30 mg/dL 38(H) 41(H) 30   Cr 0.66 - 1.25 mg/dL 6.20(H) 6.26(H) 6.12(H)   Glucose 70 - 99 mg/dL 120(H) 149(H) 100(H)   Ca  8.5 - 10.1 mg/dL 8.6 8.7 8.4(L)   Mg 1.6 - 2.3 mg/dL - 1.6 -     Bone Health Latest Ref Rng & Units 12/10/2019 12/4/2019 11/13/2019   Phos 2.5 - 4.5 mg/dL - 2.6 2.8   PTHi 18 - 80 pg/mL 821(H) - -   Vit D Def 20 - 75 ug/L 29 - -     Heme Latest Ref Rng & Units 12/10/2019 12/4/2019 11/22/2019   WBC 4.0 - 11.0 10e9/L 3.1(L) 3.3(L) 3.1(L)   Hgb 13.3 - 17.7 g/dL 11.0(L) 10.8(L) 10.0(L)   Plt 150 - 450 10e9/L 254 206 234     Liver Latest Ref Rng & Units 12/10/2019 10/28/2019 6/7/2019   AP 40 - 150 U/L 372(H) 318(H) 278(H)   TBili 0.2 - 1.3 mg/dL 1.0 0.7 1.1   DBili 0.0 - 0.2 mg/dL - - -   ALT 0 - 70 U/L 16 14 19   AST 0 - 45 U/L 11 11 14   Tot Protein 6.8 - 8.8 g/dL 7.6 7.6 7.0   Albumin 3.4 - 5.0 g/dL 4.0 3.8 3.4     Pancreas Latest Ref Rng & Units 12/10/2019 9/23/2019 6/10/2019   A1C 0 - 5.6 % 5.2 5.6 5.2   Amylase 30 - 110 U/L - - -     Iron studies Latest Ref Rng & Units 12/10/2019 6/10/2019 7/10/2018   Iron 35 - 180 ug/dL 84 118 66   Iron sat 15 - 46 % 35 47(H) 28   Ferritin 26 - 388 ng/mL 445(H) 644(H) 771(H)     [facility] Txp Virology Latest Ref Rng & Units 12/10/2019 10/28/2019 6/7/2019   CVM DNA Quant - - EDTA PLASMA Plasma, EDTA  anticoagulant   Hep B Core NR:Nonreactive Nonreactive - Nonreactive        Recent Labs   Lab Test 11/04/19  0833 11/13/19  0859 12/04/19  0908   DOSTAC 8:30PM 11 10P 11/12/19 0830pm   TACROL 10.4 6.5 7.0     Recent Labs   Lab Test 12/12/18  0612   DOSMPA Not Provided   MPACID 4.23*   MPAG 144.3*

## 2019-12-17 ENCOUNTER — ANESTHESIA EVENT (OUTPATIENT)
Dept: SURGERY | Facility: CLINIC | Age: 64
DRG: 652 | End: 2019-12-17
Payer: MEDICARE

## 2019-12-19 ENCOUNTER — TELEPHONE (OUTPATIENT)
Dept: RHEUMATOLOGY | Facility: CLINIC | Age: 64
End: 2019-12-19

## 2019-12-19 ENCOUNTER — ANESTHESIA (OUTPATIENT)
Dept: SURGERY | Facility: CLINIC | Age: 64
DRG: 652 | End: 2019-12-19
Payer: MEDICARE

## 2019-12-19 ENCOUNTER — HOSPITAL ENCOUNTER (INPATIENT)
Facility: CLINIC | Age: 64
LOS: 3 days | Discharge: HOME HEALTH | DRG: 652 | End: 2019-12-22
Attending: SURGERY | Admitting: SURGERY
Payer: MEDICARE

## 2019-12-19 ENCOUNTER — DOCUMENTATION ONLY (OUTPATIENT)
Dept: TRANSPLANT | Facility: CLINIC | Age: 64
End: 2019-12-19

## 2019-12-19 ENCOUNTER — APPOINTMENT (OUTPATIENT)
Dept: GENERAL RADIOLOGY | Facility: CLINIC | Age: 64
DRG: 652 | End: 2019-12-19
Attending: SURGERY
Payer: MEDICARE

## 2019-12-19 DIAGNOSIS — N18.6 ESRD (END STAGE RENAL DISEASE) (H): ICD-10-CM

## 2019-12-19 DIAGNOSIS — Z94.0 KIDNEY REPLACED BY TRANSPLANT: Primary | ICD-10-CM

## 2019-12-19 LAB
ABO + RH BLD: NORMAL
ABO + RH BLD: NORMAL
ANION GAP SERPL CALCULATED.3IONS-SCNC: 10 MMOL/L (ref 3–14)
BASE DEFICIT BLDA-SCNC: 7.2 MMOL/L
BASE DEFICIT BLDV-SCNC: 3.2 MMOL/L
BLD GP AB SCN SERPL QL: NORMAL
BLD PROD TYP BPU: NORMAL
BLOOD BANK CMNT PATIENT-IMP: NORMAL
BUN SERPL-MCNC: 55 MG/DL (ref 7–30)
CA-I BLD-MCNC: 4.5 MG/DL (ref 4.4–5.2)
CALCIUM SERPL-MCNC: 8.3 MG/DL (ref 8.5–10.1)
CHLORIDE SERPL-SCNC: 105 MMOL/L (ref 94–109)
CO2 SERPL-SCNC: 20 MMOL/L (ref 20–32)
CREAT SERPL-MCNC: 7.77 MG/DL (ref 0.66–1.25)
ERYTHROCYTE [DISTWIDTH] IN BLOOD BY AUTOMATED COUNT: 15 % (ref 10–15)
GFR SERPL CREATININE-BSD FRML MDRD: 7 ML/MIN/{1.73_M2}
GLUCOSE BLD-MCNC: 183 MG/DL (ref 70–99)
GLUCOSE BLDC GLUCOMTR-MCNC: 114 MG/DL (ref 70–99)
GLUCOSE BLDC GLUCOMTR-MCNC: 192 MG/DL (ref 70–99)
GLUCOSE SERPL-MCNC: 210 MG/DL (ref 70–99)
HCO3 BLD-SCNC: 19 MMOL/L (ref 21–28)
HCO3 BLDV-SCNC: 20 MMOL/L (ref 21–28)
HCT VFR BLD AUTO: 30.8 % (ref 40–53)
HGB BLD-MCNC: 8.2 G/DL (ref 13.3–17.7)
HGB BLD-MCNC: 9.7 G/DL (ref 13.3–17.7)
MAGNESIUM SERPL-MCNC: 1.4 MG/DL (ref 1.6–2.3)
MCH RBC QN AUTO: 33.1 PG (ref 26.5–33)
MCHC RBC AUTO-ENTMCNC: 31.5 G/DL (ref 31.5–36.5)
MCV RBC AUTO: 105 FL (ref 78–100)
NUM BPU REQUESTED: 2
O2/TOTAL GAS SETTING VFR VENT: 41 %
PCO2 BLD: 37 MM HG (ref 35–45)
PCO2 BLDV: 29 MM HG (ref 40–50)
PH BLD: 7.3 PH (ref 7.35–7.45)
PH BLDV: 7.45 PH (ref 7.32–7.43)
PHOSPHATE SERPL-MCNC: 4.6 MG/DL (ref 2.5–4.5)
PLATELET # BLD AUTO: 151 10E9/L (ref 150–450)
PO2 BLD: 157 MM HG (ref 80–105)
PO2 BLDV: 99 MM HG (ref 25–47)
POTASSIUM BLD-SCNC: 4.6 MMOL/L (ref 3.4–5.3)
POTASSIUM BLD-SCNC: 4.6 MMOL/L (ref 3.4–5.3)
POTASSIUM SERPL-SCNC: 4.5 MMOL/L (ref 3.4–5.3)
POTASSIUM SERPL-SCNC: 4.5 MMOL/L (ref 3.4–5.3)
RBC # BLD AUTO: 2.93 10E12/L (ref 4.4–5.9)
SODIUM BLD-SCNC: 134 MMOL/L (ref 133–144)
SODIUM SERPL-SCNC: 134 MMOL/L (ref 133–144)
SPECIMEN EXP DATE BLD: NORMAL
WBC # BLD AUTO: 4.7 10E9/L (ref 4–11)

## 2019-12-19 PROCEDURE — 85027 COMPLETE CBC AUTOMATED: CPT | Performed by: SURGERY

## 2019-12-19 PROCEDURE — P9041 ALBUMIN (HUMAN),5%, 50ML: HCPCS | Performed by: NURSE ANESTHETIST, CERTIFIED REGISTERED

## 2019-12-19 PROCEDURE — 86900 BLOOD TYPING SEROLOGIC ABO: CPT | Performed by: NURSE PRACTITIONER

## 2019-12-19 PROCEDURE — 25000125 ZZHC RX 250: Performed by: NURSE PRACTITIONER

## 2019-12-19 PROCEDURE — 25800030 ZZH RX IP 258 OP 636: Performed by: NURSE ANESTHETIST, CERTIFIED REGISTERED

## 2019-12-19 PROCEDURE — 84295 ASSAY OF SERUM SODIUM: CPT

## 2019-12-19 PROCEDURE — 85014 HEMATOCRIT: CPT

## 2019-12-19 PROCEDURE — C9290 INJ, BUPIVACAINE LIPOSOME: HCPCS | Performed by: STUDENT IN AN ORGANIZED HEALTH CARE EDUCATION/TRAINING PROGRAM

## 2019-12-19 PROCEDURE — 82803 BLOOD GASES ANY COMBINATION: CPT

## 2019-12-19 PROCEDURE — 25000125 ZZHC RX 250: Performed by: STUDENT IN AN ORGANIZED HEALTH CARE EDUCATION/TRAINING PROGRAM

## 2019-12-19 PROCEDURE — 40000986 XR CHEST PORT 1 VW

## 2019-12-19 PROCEDURE — 71000016 ZZH RECOVERY PHASE 1 LEVEL 3 FIRST HR: Performed by: SURGERY

## 2019-12-19 PROCEDURE — 25800030 ZZH RX IP 258 OP 636: Performed by: SURGERY

## 2019-12-19 PROCEDURE — C2617 STENT, NON-COR, TEM W/O DEL: HCPCS | Performed by: SURGERY

## 2019-12-19 PROCEDURE — 00000146 ZZHCL STATISTIC GLUCOSE BY METER IP

## 2019-12-19 PROCEDURE — 12000004 ZZH R&B IMCU UMMC

## 2019-12-19 PROCEDURE — 82803 BLOOD GASES ANY COMBINATION: CPT | Performed by: SURGERY

## 2019-12-19 PROCEDURE — 27210794 ZZH OR GENERAL SUPPLY STERILE: Performed by: SURGERY

## 2019-12-19 PROCEDURE — 80048 BASIC METABOLIC PNL TOTAL CA: CPT | Performed by: SURGERY

## 2019-12-19 PROCEDURE — 25000128 H RX IP 250 OP 636: Performed by: STUDENT IN AN ORGANIZED HEALTH CARE EDUCATION/TRAINING PROGRAM

## 2019-12-19 PROCEDURE — 40000275 ZZH STATISTIC RCP TIME EA 10 MIN

## 2019-12-19 PROCEDURE — 25000125 ZZHC RX 250: Performed by: SURGERY

## 2019-12-19 PROCEDURE — 25800030 ZZH RX IP 258 OP 636: Performed by: NURSE PRACTITIONER

## 2019-12-19 PROCEDURE — 25000128 H RX IP 250 OP 636: Performed by: NURSE PRACTITIONER

## 2019-12-19 PROCEDURE — 86901 BLOOD TYPING SEROLOGIC RH(D): CPT | Performed by: NURSE PRACTITIONER

## 2019-12-19 PROCEDURE — 36415 COLL VENOUS BLD VENIPUNCTURE: CPT | Performed by: NURSE PRACTITIONER

## 2019-12-19 PROCEDURE — 83735 ASSAY OF MAGNESIUM: CPT | Performed by: SURGERY

## 2019-12-19 PROCEDURE — 25000125 ZZHC RX 250: Performed by: NURSE ANESTHETIST, CERTIFIED REGISTERED

## 2019-12-19 PROCEDURE — 25000128 H RX IP 250 OP 636: Performed by: ANESTHESIOLOGY

## 2019-12-19 PROCEDURE — 25000128 H RX IP 250 OP 636: Performed by: SURGERY

## 2019-12-19 PROCEDURE — 25000565 ZZH ISOFLURANE, EA 15 MIN: Performed by: SURGERY

## 2019-12-19 PROCEDURE — 25000132 ZZH RX MED GY IP 250 OP 250 PS 637: Mod: GY | Performed by: SURGERY

## 2019-12-19 PROCEDURE — 82330 ASSAY OF CALCIUM: CPT

## 2019-12-19 PROCEDURE — 81100000 ZZH ACQUISITION KIDNEY LIVING DONOR

## 2019-12-19 PROCEDURE — 86923 COMPATIBILITY TEST ELECTRIC: CPT | Performed by: NURSE PRACTITIONER

## 2019-12-19 PROCEDURE — 0T760DZ DILATION OF RIGHT URETER WITH INTRALUMINAL DEVICE, OPEN APPROACH: ICD-10-PCS | Performed by: SURGERY

## 2019-12-19 PROCEDURE — C1758 CATHETER, URETERAL: HCPCS | Performed by: SURGERY

## 2019-12-19 PROCEDURE — 25000128 H RX IP 250 OP 636: Performed by: NURSE ANESTHETIST, CERTIFIED REGISTERED

## 2019-12-19 PROCEDURE — 30243S1 TRANSFUSION OF NONAUTOLOGOUS GLOBULIN INTO CENTRAL VEIN, PERCUTANEOUS APPROACH: ICD-10-PCS | Performed by: SURGERY

## 2019-12-19 PROCEDURE — 40000171 ZZH STATISTIC PRE-PROCEDURE ASSESSMENT III: Performed by: SURGERY

## 2019-12-19 PROCEDURE — 84100 ASSAY OF PHOSPHORUS: CPT | Performed by: SURGERY

## 2019-12-19 PROCEDURE — 40000196 ZZH STATISTIC RAPCV CVP MONITORING

## 2019-12-19 PROCEDURE — 84132 ASSAY OF SERUM POTASSIUM: CPT | Performed by: ANESTHESIOLOGY

## 2019-12-19 PROCEDURE — 37000009 ZZH ANESTHESIA TECHNICAL FEE, EACH ADDTL 15 MIN: Performed by: SURGERY

## 2019-12-19 PROCEDURE — 37000008 ZZH ANESTHESIA TECHNICAL FEE, 1ST 30 MIN: Performed by: SURGERY

## 2019-12-19 PROCEDURE — 82947 ASSAY GLUCOSE BLOOD QUANT: CPT

## 2019-12-19 PROCEDURE — 84132 ASSAY OF SERUM POTASSIUM: CPT | Performed by: SURGERY

## 2019-12-19 PROCEDURE — 84295 ASSAY OF SERUM SODIUM: CPT | Performed by: SURGERY

## 2019-12-19 PROCEDURE — 84132 ASSAY OF SERUM POTASSIUM: CPT

## 2019-12-19 PROCEDURE — 86850 RBC ANTIBODY SCREEN: CPT | Performed by: NURSE PRACTITIONER

## 2019-12-19 PROCEDURE — 82803 BLOOD GASES ANY COMBINATION: CPT | Performed by: ANESTHESIOLOGY

## 2019-12-19 PROCEDURE — 36415 COLL VENOUS BLD VENIPUNCTURE: CPT | Performed by: ANESTHESIOLOGY

## 2019-12-19 PROCEDURE — 36000062 ZZH SURGERY LEVEL 4 1ST 30 MIN - UMMC: Performed by: SURGERY

## 2019-12-19 PROCEDURE — 0TY00Z0 TRANSPLANTATION OF RIGHT KIDNEY, ALLOGENEIC, OPEN APPROACH: ICD-10-PCS | Performed by: SURGERY

## 2019-12-19 PROCEDURE — 25800029 ZZH RX IP 258 OP 250: Performed by: SURGERY

## 2019-12-19 PROCEDURE — 71000017 ZZH RECOVERY PHASE 1 LEVEL 3 EA ADDTL HR: Performed by: SURGERY

## 2019-12-19 PROCEDURE — 36000064 ZZH SURGERY LEVEL 4 EA 15 ADDTL MIN - UMMC: Performed by: SURGERY

## 2019-12-19 PROCEDURE — 82330 ASSAY OF CALCIUM: CPT | Performed by: SURGERY

## 2019-12-19 PROCEDURE — 40000014 ZZH STATISTIC ARTERIAL MONITORING DAILY

## 2019-12-19 PROCEDURE — 82947 ASSAY GLUCOSE BLOOD QUANT: CPT | Performed by: SURGERY

## 2019-12-19 DEVICE — URETERAL STENT
Type: IMPLANTABLE DEVICE | Site: URETER | Status: NON-FUNCTIONAL
Brand: PERCUFLEX™ PLUS
Removed: 2020-01-27

## 2019-12-19 RX ORDER — LIDOCAINE 40 MG/G
CREAM TOPICAL
Status: DISCONTINUED | OUTPATIENT
Start: 2019-12-19 | End: 2019-12-19 | Stop reason: HOSPADM

## 2019-12-19 RX ORDER — MANNITOL 20 G/100ML
INJECTION, SOLUTION INTRAVENOUS PRN
Status: DISCONTINUED | OUTPATIENT
Start: 2019-12-19 | End: 2019-12-19

## 2019-12-19 RX ORDER — BUPIVACAINE HYDROCHLORIDE 2.5 MG/ML
INJECTION, SOLUTION EPIDURAL; INFILTRATION; INTRACAUDAL PRN
Status: DISCONTINUED | OUTPATIENT
Start: 2019-12-19 | End: 2019-12-19

## 2019-12-19 RX ORDER — HYDROMORPHONE HYDROCHLORIDE 1 MG/ML
.3-.5 INJECTION, SOLUTION INTRAMUSCULAR; INTRAVENOUS; SUBCUTANEOUS EVERY 5 MIN PRN
Status: DISCONTINUED | OUTPATIENT
Start: 2019-12-19 | End: 2019-12-20 | Stop reason: HOSPADM

## 2019-12-19 RX ORDER — FENTANYL CITRATE 50 UG/ML
25-50 INJECTION, SOLUTION INTRAMUSCULAR; INTRAVENOUS EVERY 5 MIN PRN
Status: DISCONTINUED | OUTPATIENT
Start: 2019-12-19 | End: 2019-12-20 | Stop reason: HOSPADM

## 2019-12-19 RX ORDER — SODIUM CHLORIDE 9 MG/ML
1000 INJECTION, SOLUTION INTRAVENOUS CONTINUOUS PRN
Status: DISCONTINUED | OUTPATIENT
Start: 2019-12-19 | End: 2019-12-20

## 2019-12-19 RX ORDER — SODIUM CHLORIDE 9 MG/ML
INJECTION, SOLUTION INTRAVENOUS CONTINUOUS
Status: DISCONTINUED | OUTPATIENT
Start: 2019-12-19 | End: 2019-12-20 | Stop reason: HOSPADM

## 2019-12-19 RX ORDER — ACETAMINOPHEN 325 MG/1
975 TABLET ORAL EVERY 8 HOURS
Status: DISCONTINUED | OUTPATIENT
Start: 2019-12-19 | End: 2019-12-22 | Stop reason: HOSPADM

## 2019-12-19 RX ORDER — SODIUM CHLORIDE 9 MG/ML
INJECTION, SOLUTION INTRAVENOUS CONTINUOUS PRN
Status: DISCONTINUED | OUTPATIENT
Start: 2019-12-19 | End: 2019-12-19

## 2019-12-19 RX ORDER — FENTANYL CITRATE 50 UG/ML
INJECTION, SOLUTION INTRAMUSCULAR; INTRAVENOUS PRN
Status: DISCONTINUED | OUTPATIENT
Start: 2019-12-19 | End: 2019-12-19

## 2019-12-19 RX ORDER — PROPOFOL 10 MG/ML
INJECTION, EMULSION INTRAVENOUS PRN
Status: DISCONTINUED | OUTPATIENT
Start: 2019-12-19 | End: 2019-12-19

## 2019-12-19 RX ORDER — FLUMAZENIL 0.1 MG/ML
0.2 INJECTION, SOLUTION INTRAVENOUS
Status: DISCONTINUED | OUTPATIENT
Start: 2019-12-19 | End: 2019-12-19 | Stop reason: HOSPADM

## 2019-12-19 RX ORDER — DEXTROSE, SODIUM CHLORIDE, SODIUM LACTATE, POTASSIUM CHLORIDE, AND CALCIUM CHLORIDE 5; .6; .31; .03; .02 G/100ML; G/100ML; G/100ML; G/100ML; G/100ML
INJECTION, SOLUTION INTRAVENOUS CONTINUOUS
Status: DISCONTINUED | OUTPATIENT
Start: 2019-12-19 | End: 2019-12-20

## 2019-12-19 RX ORDER — CALCIUM CHLORIDE 100 MG/ML
INJECTION INTRAVENOUS; INTRAVENTRICULAR PRN
Status: DISCONTINUED | OUTPATIENT
Start: 2019-12-19 | End: 2019-12-19

## 2019-12-19 RX ORDER — HEPARIN SODIUM 1000 [USP'U]/ML
INJECTION, SOLUTION INTRAVENOUS; SUBCUTANEOUS PRN
Status: DISCONTINUED | OUTPATIENT
Start: 2019-12-19 | End: 2019-12-19

## 2019-12-19 RX ORDER — LIDOCAINE HYDROCHLORIDE 20 MG/ML
INJECTION, SOLUTION INFILTRATION; PERINEURAL PRN
Status: DISCONTINUED | OUTPATIENT
Start: 2019-12-19 | End: 2019-12-19

## 2019-12-19 RX ORDER — ONDANSETRON 2 MG/ML
4 INJECTION INTRAMUSCULAR; INTRAVENOUS EVERY 30 MIN PRN
Status: DISCONTINUED | OUTPATIENT
Start: 2019-12-19 | End: 2019-12-20 | Stop reason: HOSPADM

## 2019-12-19 RX ORDER — FENTANYL CITRATE 50 UG/ML
25-50 INJECTION, SOLUTION INTRAMUSCULAR; INTRAVENOUS
Status: DISCONTINUED | OUTPATIENT
Start: 2019-12-19 | End: 2019-12-19 | Stop reason: HOSPADM

## 2019-12-19 RX ORDER — ALBUMIN, HUMAN INJ 5% 5 %
SOLUTION INTRAVENOUS CONTINUOUS PRN
Status: DISCONTINUED | OUTPATIENT
Start: 2019-12-19 | End: 2019-12-19

## 2019-12-19 RX ORDER — FUROSEMIDE 10 MG/ML
INJECTION INTRAMUSCULAR; INTRAVENOUS PRN
Status: DISCONTINUED | OUTPATIENT
Start: 2019-12-19 | End: 2019-12-19

## 2019-12-19 RX ORDER — CEFUROXIME SODIUM 1.5 G/16ML
1.5 INJECTION, POWDER, FOR SOLUTION INTRAVENOUS ONCE
Status: COMPLETED | OUTPATIENT
Start: 2019-12-19 | End: 2019-12-19

## 2019-12-19 RX ORDER — ONDANSETRON 4 MG/1
4 TABLET, ORALLY DISINTEGRATING ORAL EVERY 30 MIN PRN
Status: DISCONTINUED | OUTPATIENT
Start: 2019-12-19 | End: 2019-12-20 | Stop reason: HOSPADM

## 2019-12-19 RX ORDER — SODIUM CHLORIDE, SODIUM LACTATE, POTASSIUM CHLORIDE, CALCIUM CHLORIDE 600; 310; 30; 20 MG/100ML; MG/100ML; MG/100ML; MG/100ML
INJECTION, SOLUTION INTRAVENOUS CONTINUOUS PRN
Status: DISCONTINUED | OUTPATIENT
Start: 2019-12-19 | End: 2019-12-19

## 2019-12-19 RX ORDER — NALOXONE HYDROCHLORIDE 0.4 MG/ML
.1-.4 INJECTION, SOLUTION INTRAMUSCULAR; INTRAVENOUS; SUBCUTANEOUS
Status: DISCONTINUED | OUTPATIENT
Start: 2019-12-19 | End: 2019-12-19 | Stop reason: HOSPADM

## 2019-12-19 RX ORDER — SODIUM CHLORIDE 450 MG/100ML
INJECTION, SOLUTION INTRAVENOUS CONTINUOUS PRN
Status: DISCONTINUED | OUTPATIENT
Start: 2019-12-19 | End: 2019-12-20

## 2019-12-19 RX ADMIN — ROCURONIUM BROMIDE 20 MG: 10 INJECTION INTRAVENOUS at 19:42

## 2019-12-19 RX ADMIN — PROPOFOL 150 MG: 10 INJECTION, EMULSION INTRAVENOUS at 16:13

## 2019-12-19 RX ADMIN — FENTANYL CITRATE 50 MCG: 50 INJECTION, SOLUTION INTRAMUSCULAR; INTRAVENOUS at 16:45

## 2019-12-19 RX ADMIN — SODIUM CHLORIDE: 9 INJECTION, SOLUTION INTRAVENOUS at 19:49

## 2019-12-19 RX ADMIN — LIDOCAINE HYDROCHLORIDE 100 MG: 20 INJECTION, SOLUTION INFILTRATION; PERINEURAL at 16:13

## 2019-12-19 RX ADMIN — FENTANYL CITRATE 50 MCG: 50 INJECTION, SOLUTION INTRAMUSCULAR; INTRAVENOUS at 17:31

## 2019-12-19 RX ADMIN — SODIUM CHLORIDE, SODIUM LACTATE, POTASSIUM CHLORIDE, CALCIUM CHLORIDE AND DEXTROSE MONOHYDRATE: 5; 600; 310; 30; 20 INJECTION, SOLUTION INTRAVENOUS at 22:39

## 2019-12-19 RX ADMIN — FENTANYL CITRATE 25 MCG: 50 INJECTION, SOLUTION INTRAMUSCULAR; INTRAVENOUS at 15:06

## 2019-12-19 RX ADMIN — ACETAMINOPHEN 975 MG: 325 TABLET, FILM COATED ORAL at 23:53

## 2019-12-19 RX ADMIN — SODIUM CHLORIDE 1000 ML: 9 INJECTION, SOLUTION INTRAVENOUS at 23:00

## 2019-12-19 RX ADMIN — SODIUM CHLORIDE: 9 INJECTION, SOLUTION INTRAVENOUS at 16:24

## 2019-12-19 RX ADMIN — FENTANYL CITRATE 50 MCG: 50 INJECTION, SOLUTION INTRAMUSCULAR; INTRAVENOUS at 15:02

## 2019-12-19 RX ADMIN — BUPIVACAINE HYDROCHLORIDE 30 ML: 2.5 INJECTION, SOLUTION EPIDURAL; INFILTRATION; INTRACAUDAL; PERINEURAL at 15:03

## 2019-12-19 RX ADMIN — MYCOPHENOLATE MOFETIL 1500 MG: 500 INJECTION, POWDER, LYOPHILIZED, FOR SOLUTION INTRAVENOUS at 17:04

## 2019-12-19 RX ADMIN — CALCIUM CHLORIDE 200 MG: 100 INJECTION, SOLUTION INTRAVENOUS at 19:34

## 2019-12-19 RX ADMIN — HYDROMORPHONE HYDROCHLORIDE 0.5 MG: 1 INJECTION, SOLUTION INTRAMUSCULAR; INTRAVENOUS; SUBCUTANEOUS at 23:16

## 2019-12-19 RX ADMIN — FENTANYL CITRATE 25 MCG: 50 INJECTION, SOLUTION INTRAMUSCULAR; INTRAVENOUS at 21:35

## 2019-12-19 RX ADMIN — MANNITOL 25 G: 20 INJECTION, SOLUTION INTRAVENOUS at 19:20

## 2019-12-19 RX ADMIN — ROCURONIUM BROMIDE 20 MG: 10 INJECTION INTRAVENOUS at 18:10

## 2019-12-19 RX ADMIN — FENTANYL CITRATE 100 MCG: 50 INJECTION, SOLUTION INTRAMUSCULAR; INTRAVENOUS at 16:13

## 2019-12-19 RX ADMIN — MIDAZOLAM 0.5 MG: 1 INJECTION INTRAMUSCULAR; INTRAVENOUS at 15:03

## 2019-12-19 RX ADMIN — MIDAZOLAM 0.5 MG: 1 INJECTION INTRAMUSCULAR; INTRAVENOUS at 15:05

## 2019-12-19 RX ADMIN — SODIUM CHLORIDE 500 MG: 9 INJECTION, SOLUTION INTRAVENOUS at 16:21

## 2019-12-19 RX ADMIN — ROCURONIUM BROMIDE 20 MG: 10 INJECTION INTRAVENOUS at 19:02

## 2019-12-19 RX ADMIN — ROCURONIUM BROMIDE 30 MG: 10 INJECTION INTRAVENOUS at 17:10

## 2019-12-19 RX ADMIN — ROCURONIUM BROMIDE 50 MG: 10 INJECTION INTRAVENOUS at 16:13

## 2019-12-19 RX ADMIN — FENTANYL CITRATE 50 MCG: 50 INJECTION, SOLUTION INTRAMUSCULAR; INTRAVENOUS at 18:16

## 2019-12-19 RX ADMIN — HEPARIN SODIUM 1000 UNITS: 1000 INJECTION, SOLUTION INTRAVENOUS; SUBCUTANEOUS at 18:38

## 2019-12-19 RX ADMIN — SUGAMMADEX 150 MG: 100 INJECTION, SOLUTION INTRAVENOUS at 21:15

## 2019-12-19 RX ADMIN — FENTANYL CITRATE 50 MCG: 50 INJECTION, SOLUTION INTRAMUSCULAR; INTRAVENOUS at 21:23

## 2019-12-19 RX ADMIN — Medication 2 G: at 23:41

## 2019-12-19 RX ADMIN — MIDAZOLAM 1 MG: 1 INJECTION INTRAMUSCULAR; INTRAVENOUS at 15:51

## 2019-12-19 RX ADMIN — FENTANYL CITRATE 50 MCG: 50 INJECTION, SOLUTION INTRAMUSCULAR; INTRAVENOUS at 21:09

## 2019-12-19 RX ADMIN — ALBUMIN HUMAN: 0.05 INJECTION, SOLUTION INTRAVENOUS at 19:00

## 2019-12-19 RX ADMIN — CEFUROXIME 1.5 G: 1.5 INJECTION, POWDER, FOR SOLUTION INTRAVENOUS at 17:06

## 2019-12-19 RX ADMIN — FUROSEMIDE 100 MG: 10 INJECTION, SOLUTION INTRAVENOUS at 19:18

## 2019-12-19 RX ADMIN — BUPIVACAINE 20 ML: 13.3 INJECTION, SUSPENSION, LIPOSOMAL INFILTRATION at 15:03

## 2019-12-19 RX ADMIN — ROCURONIUM BROMIDE 10 MG: 10 INJECTION INTRAVENOUS at 20:30

## 2019-12-19 RX ADMIN — SODIUM CHLORIDE 1000 ML: 4.5 INJECTION, SOLUTION INTRAVENOUS at 23:00

## 2019-12-19 RX ADMIN — ANTI-THYMOCYTE GLOBULIN (RABBIT) 150 MG: 5 INJECTION, POWDER, LYOPHILIZED, FOR SOLUTION INTRAVENOUS at 17:01

## 2019-12-19 RX ADMIN — SODIUM CHLORIDE, POTASSIUM CHLORIDE, SODIUM LACTATE AND CALCIUM CHLORIDE: 600; 310; 30; 20 INJECTION, SOLUTION INTRAVENOUS at 15:20

## 2019-12-19 ASSESSMENT — MIFFLIN-ST. JEOR: SCORE: 1514.41

## 2019-12-19 NOTE — TELEPHONE ENCOUNTER
Referral from Abram Moulton MD for Synovitis of hand [M65.9];Heart transplanted (H) [Z94.1];H/O: gout [Z87.39.  Grecia Benton CMA   12/19/2019 1:43 PM

## 2019-12-19 NOTE — ANESTHESIA PROCEDURE NOTES
Peripheral Nerve Block Procedure Note    Staff:     Anesthesiologist:  Doni Durand MD    Referred By:  Shira Campbell MD  Location: Pre-op  Procedure Start/Stop TImes:     patient identified, IV checked, site marked, risks and benefits discussed, informed consent, monitors and equipment checked, pre-op evaluation, at physician/surgeon's request and post-op pain management      Correct Patient: Yes      Correct Position: Yes      Correct Site: Yes      Correct Procedure: Yes      Correct Laterality:  Yes    Site Marked:  Yes  Procedure details:     Procedure:  TAP    ASA:  3    Laterality:  Bilateral    Position:  Supine    Sterile Prep: chloraprep, mask and sterile gloves      Local skin infiltration:  None    Needle:  Short bevel    Needle gauge:  21    Needle length (mm):  110    Ultrasound: Yes      Ultrasound used to identify targeted nerve, plexus, or vascular structure and placed a needle adjacent to it      Permanent Image entered into patiient's record      Abnormal pain on injection: No      Blood Aspirated: No      Paresthesias:  No    Bleeding at site: No      Bolus via:  Needle    Infusion Method:  Single Shot    Complications:  None

## 2019-12-19 NOTE — ANESTHESIA PREPROCEDURE EVALUATION
Anesthesia Pre-Procedure Evaluation    Patient: Murray Nicholson   MRN:     [id number] Gender:   male   Age:    64 year old :      1955        Preoperative Diagnosis: ESRD (end stage renal disease) (H) [N18.6]   Procedure(s):  Kidney Transplant Non-Directed Recipient     Past Medical History:   Diagnosis Date     (HFpEF) heart failure with preserved ejection fraction (H)      Allergic rhinitis, cause unspecified      Anemia of chronic kidney failure      AS (aortic stenosis)      Ascending aortic aneurysm (H)      Bicuspid aortic valve      CAD (coronary artery disease)      Congestive heart failure, unspecified      Dialysis patient (H)     Tues-Thur-Sat     Dyslipidemia      Esophageal reflux      ESRD (end stage renal disease) (H)      Hearing problem      Heart replaced by transplant (H) 12/10/2018     Hypersomnia with sleep apnea, unspecified      Hypertension      Ileostomy status (H)      Immunosuppression (H)      MGUS (monoclonal gammopathy of unknown significance)      Mitral regurgitation      SHEELA (obstructive sleep apnea)     No CPAP     Pneumonia      Systolic heart failure (H)      Type 2 diabetes mellitus (H)       Past Surgical History:   Procedure Laterality Date     CARDIAC SURGERY       COLONOSCOPY N/A 5/3/2018    Procedure: COLONOSCOPY;  colonoscopy ;  Surgeon: Ammon Castillo MD;  [facility]     CREATE FISTULA ARTERIOVENOUS UPPER EXTREMITY BOVINE Left 2019    Procedure: Left Upper Extremity Arteriovenous Bovine Graft Creation;  Surgeon: Calin Cheney MD;  [facility]     CV CORONARY ANGIOGRAM N/A 2019    Procedure: CV CORONARY ANGIOGRAM;  Surgeon: Montrell Posada MD;  [facility]     CV HEART BIOPSY N/A 2019    Procedure: HBX;  Surgeon: Montrell Posada MD;  [facility]     CV HEART BIOPSY N/A 3/22/2019    Procedure: HBX, RIJV ACCESS;  Surgeon: Jordan Fox MD;  Location:  [facility] CARDI[facility] CATH LAB     CV  HEART BIOPSY N/A 6/28/2019    Procedure: CV HEART BIOPSY;  Surgeon: Montrell Posada MD;  Location:  [facility] CARDIAC CATH LAB     CV HEART BIOPSY N/A 10/28/2019    Procedure: CV HEART BIOPSY;  Surgeon: Marcelo Ramírez MD;  Location:  [facility]ART CARDIAC CATH LAB     CV RIGHT HEART CATH N/A 6/28/2019    Procedure: CV RIGHT HEART CATH;  Surgeon: Montrell Posada MD;  Location:  HEART CARDIAC CATH LAB     ESOPHAGOSCOPY, GASTROSCOPY, DUODENOSCOPY (EGD), COMBINED N/A 5/7/2018    Procedure: COMBINED ENDOSCOPIC ULTRASOUND, ESOPHAGOSCOPY, GASTROSCOPY, DUODENOSCOPY (EGD), FINE NEEDLE ASPIRATE/BIOPSY;  Endoscopic Ultrasound with Fine Needle Aspiration ;  Surgeon: Alon Don MD;  [facility]     EXAM UNDER ANESTHESIA RECTUM N/A 8/12/2018    Procedure: EXAM UNDER ANESTHESIA RECTUM;  EXAM UNDER ANESTHESIA RECTUM ,COMBINED INCISION AND DRAINAGE OF RECTAL ABCESS ;  Surgeon: Rick Tran MD;  [facility]     INCISION AND DRAINAGE RECTUM, COMBINED N/A 8/12/2018    Procedure: COMBINED INCISION AND DRAINAGE RECTUM;;  Surgeon: Rick Tran MD;  [facility]     IR DIALYSIS FISTULOGRAM LEFT  9/25/2019     IR DIALYSIS PTA  9/25/2019     LAPAROSCOPIC ASSISTED COLOSTOMY TAKEDOWN N/A 12/11/2018    Procedure: Laparoscopic Assisted Colostomy Takedown, Laparoscopic Lysis of Adhesions;  Surgeon: Rick Tran MD;  [facility]     LAPAROSCOPIC ASSISTED SIGMOID COLECTOMY N/A 8/14/2018    Procedure: LAPAROSCOPIC ASSISTED SIGMOID COLECTOMY;  Laparoscopic Hand Assisted Takedown of Splenic Flexure, Sigmoidectomy, Small Bowel Resection, Takedown of Small Bowel to Colon Fistula;  Surgeon: Rick Tran MD;  [facility]     LAPAROSCOPIC HERNIORRHAPHY INGUINAL BILATERAL Bilateral 7/24/2015    Procedure: LAPAROSCOPIC HERNIORRHAPHY INGUINAL BILATERAL;  Surgeon: Bobby Mcconnell MD;  [facility]     LAPAROSCOPIC INSERTION CATHETER PERITONEAL DIALYSIS N/A  6/22/2017    Procedure: LAPAROSCOPIC INSERTION CATHETER PERITONEAL DIALYSIS;  Laparoscopic Peritoneal Dialysis Catheter Placement - Anesthesia with block;  Surgeon: Esteban Arvizu MD;  [facility]     PICC INSERTION Left 04/22/2018    5Fr - 49cm (3cm external), Basilic vein, low SVC     REMOVE CATHETER PERITONEAL Right 1/15/2018    Procedure: REMOVE CATHETER PERITONEAL;  Open Removal of Peritoneal Dialysis Catheter ;  Surgeon: Esteban Arvizu MD;  [facility]     SIGMOIDOSCOPY FLEXIBLE N/A 11/21/2018    Procedure: Examination Under Anesthesia, Flexible Sigmoidoscopy and Polypectomy;  Surgeon: Rick Tran MD;  [facility]     SIGMOIDOSCOPY FLEXIBLE N/A 12/11/2018    Procedure: Flexible Sigmoidoscopy;  Surgeon: Rick Tran MD;  [facility]     TAKEDOWN ILEOSTOMY N/A 3/27/2019    Procedure: Takedown Ileostomy;  Surgeon: Rick Tran MD;  [facility]     TRANSPLANT HEART RECIPIENT N/A 6/14/2018    Procedure: TRANSPLANT HEART RECIPIENT;  Median Sternotomy, on-pump oxygenator, Heart Transplant;  Surgeon: Rony Caputo MD;  [facility]          Anesthesia Evaluation     .             ROS/MED HX    ENT/Pulmonary:     (+)sleep apnea, , . .    Neurologic:       Cardiovascular: Comment: Heart tx in 2018    (+) hypertension----. : . CHF . . :. .       METS/Exercise Tolerance:     Hematologic:         Musculoskeletal:         GI/Hepatic: Comment: Hx of perf diverticulitis with ileostomy s/p take down    (+) GERD       Renal/Genitourinary:     (+) chronic renal disease, type: ESRD, Pt requires dialysis,       Endo:     (+) type I DM, .      Psychiatric:         Infectious Disease: Comment: Immune supressed        Malignancy:         Other:                         PHYSICAL EXAM:   Mental Status/Neuro: A/A/O   Airway: Facies: Feasible  Mallampati: I  Mouth/Opening: Full  TM distance: > 6 cm  Neck ROM: Full   Respiratory: Auscultation: CTAB     Resp. Rate:  Normal     Resp. Effort: Normal      CV: Rhythm: Regular  Rate: Age appropriate  Heart: Normal Sounds  Edema: None   Comments:      Dental: Normal Dentition                LABS:  CBC:   Lab Results   Component Value Date    WBC 3.1 (L) 12/10/2019    WBC 3.3 (L) 12/04/2019    HGB 11.0 (L) 12/10/2019    HGB 10.8 (L) 12/04/2019    HCT 35.0 (L) 12/10/2019    HCT 33.9 (L) 12/04/2019     12/10/2019     12/04/2019     BMP:   Lab Results   Component Value Date     12/10/2019     12/04/2019    POTASSIUM 4.5 12/10/2019    POTASSIUM 4.5 12/04/2019    CHLORIDE 99 12/10/2019    CHLORIDE 103 12/04/2019    CO2 28 12/10/2019    CO2 25 12/04/2019    BUN 38 (H) 12/10/2019    BUN 41 (H) 12/04/2019    CR 6.20 (H) 12/10/2019    CR 6.26 (H) 12/04/2019     (H) 12/10/2019     (H) 12/04/2019     COAGS:   Lab Results   Component Value Date    PTT 33 05/08/2019    INR 1.15 (H) 12/10/2019    FIBR 407 08/12/2018     POC:   Lab Results   Component Value Date     (H) 12/19/2019     OTHER:   Lab Results   Component Value Date    PH 7.42 12/11/2018    LACT 1.3 09/02/2018    A1C 5.2 12/10/2019    TRINI 8.6 12/10/2019    PHOS 2.6 12/04/2019    MAG 1.6 12/04/2019    ALBUMIN 4.0 12/10/2019    PROTTOTAL 7.6 12/10/2019    ALT 16 12/10/2019    AST 11 12/10/2019    ALKPHOS 372 (H) 12/10/2019    BILITOTAL 1.0 12/10/2019    AMYLASE 62 06/14/2018    TSH 2.25 04/16/2018    CRP <2.9 09/30/2019    SED 24 (H) 09/30/2019        Preop Vitals    BP Readings from Last 3 Encounters:   12/19/19 136/89   12/10/19 128/73   12/10/19 128/73    Pulse Readings from Last 3 Encounters:   12/19/19 85   12/10/19 87   12/10/19 87      Resp Readings from Last 3 Encounters:   12/19/19 14   11/22/19 22   11/05/19 14    SpO2 Readings from Last 3 Encounters:   12/19/19 100%   12/10/19 100%   12/10/19 100%      Temp Readings from Last 1 Encounters:   12/19/19 36.8  C (98.2  F) (Oral)    Ht Readings from Last 1 Encounters:   12/19/19 1.746 m  "(5' 8.75\")      Wt Readings from Last 1 Encounters:   12/19/19 73.8 kg (162 lb 11.2 oz)    Estimated body mass index is 24.2 kg/m  as calculated from the following:    Height as of this encounter: 1.746 m (5' 8.75\").    Weight as of this encounter: 73.8 kg (162 lb 11.2 oz).     LDA:  Peripheral IV 12/19/19 Anterior;Right Hand (Active)   Number of days: 0       CVC Double Lumen 04/17/18 Right Subclavian (Active)   Site Assessment UTV 5/8/2019 11:37 AM   Lumen Soln/Vol REFERENCE 2.1/2.1 12/15/2018 11:46 AM   External Cath Length (cm) 4 cm 12/13/2018 10:45 AM   Dressing Intervention Gauze 5/8/2019  6:40 AM   Dressing Change Due 04/04/19 3/31/2019 11:48 PM   CVC Comment CDI 3/27/2019  9:00 PM   Lumen A - Color RED 3/27/2019  9:00 PM   Lumen A - Status saline locked 3/27/2019  9:00 PM   Lumen B - Color BLUE 3/27/2019  9:00 PM   Lumen B - Status saline locked 3/27/2019  9:00 PM   Extravasation? No 3/27/2019  9:00 PM   Number of days: 611       Hemodialysis Vascular Access Arteriovenous fistula Left Arm (Active)   Site Assessment WDL;Bruit present;Thrill present 5/8/2019 11:37 AM   Number of days: 225       Right Groin Interventional Procedure Access (Active)   Site Assessment WDL 6/28/2019  6:24 PM   Hemostasis management Unchanged 6/28/2019  6:24 PM   Femoral Bruit not present 6/28/2019  6:24 PM   CMS Right Extremity WDL 6/28/2019  6:24 PM   Dorsalis Pulse - Right Leg Normal 6/28/2019  6:24 PM   Posterior Tibial Pulse - Right Leg Normal 6/28/2019  6:24 PM   Number of days: 174        Assessment:   ASA SCORE: 4 emergent   H&P: History and physical reviewed and following examination; no interval change.   Smoking Status:  Non-Smoker/Unknown   NPO Status: NPO Appropriate     Plan:   Anes. Type:  General   Pre-Medication: None   Induction:  IV (RSI)   Airway: ETT; Oral   Access/Monitoring: PIV; 2nd PIV; A-Line; Central Access/Port present   Maintenance: Balanced     Postop Plan:   Postop Pain: Opioids  Postop " Sedation/Airway: Not planned  Disposition: Inpatient/Admit     PONV Management:   Adult Risk Factors:, Non-Smoker, Postop Opioids   Prevention: Ondansetron     CONSENT: Direct conversation   Plan and risks discussed with: Patient   Blood Products: Consented (ALL Blood Products)                   Lori Mcdonough MD

## 2019-12-20 ENCOUNTER — HOME CARE/HOSPICE - HEALTHEAST (OUTPATIENT)
Dept: HOME HEALTH SERVICES | Facility: HOME HEALTH | Age: 64
End: 2019-12-20

## 2019-12-20 LAB
ANION GAP SERPL CALCULATED.3IONS-SCNC: 11 MMOL/L (ref 3–14)
BASOPHILS # BLD AUTO: 0 10E9/L (ref 0–0.2)
BASOPHILS NFR BLD AUTO: 0 %
BUN SERPL-MCNC: 42 MG/DL (ref 7–30)
CALCIUM SERPL-MCNC: 8.7 MG/DL (ref 8.5–10.1)
CHLORIDE SERPL-SCNC: 98 MMOL/L (ref 94–109)
CO2 SERPL-SCNC: 21 MMOL/L (ref 20–32)
CREAT SERPL-MCNC: 4.89 MG/DL (ref 0.66–1.25)
DIFFERENTIAL METHOD BLD: ABNORMAL
DONOR IDENTIFICATION: NORMAL
DSA COMMENTS: NORMAL
DSA PRESENT: NO
DSA TEST METHOD: NORMAL
EOSINOPHIL # BLD AUTO: 0 10E9/L (ref 0–0.7)
EOSINOPHIL NFR BLD AUTO: 0 %
ERYTHROCYTE [DISTWIDTH] IN BLOOD BY AUTOMATED COUNT: 14.6 % (ref 10–15)
GFR SERPL CREATININE-BSD FRML MDRD: 12 ML/MIN/{1.73_M2}
GLUCOSE BLDC GLUCOMTR-MCNC: 107 MG/DL (ref 70–99)
GLUCOSE BLDC GLUCOMTR-MCNC: 112 MG/DL (ref 70–99)
GLUCOSE BLDC GLUCOMTR-MCNC: 125 MG/DL (ref 70–99)
GLUCOSE BLDC GLUCOMTR-MCNC: 127 MG/DL (ref 70–99)
GLUCOSE BLDC GLUCOMTR-MCNC: 135 MG/DL (ref 70–99)
GLUCOSE BLDC GLUCOMTR-MCNC: 141 MG/DL (ref 70–99)
GLUCOSE BLDC GLUCOMTR-MCNC: 180 MG/DL (ref 70–99)
GLUCOSE BLDC GLUCOMTR-MCNC: 183 MG/DL (ref 70–99)
GLUCOSE BLDC GLUCOMTR-MCNC: 196 MG/DL (ref 70–99)
GLUCOSE BLDC GLUCOMTR-MCNC: 242 MG/DL (ref 70–99)
GLUCOSE BLDC GLUCOMTR-MCNC: 243 MG/DL (ref 70–99)
GLUCOSE BLDC GLUCOMTR-MCNC: 250 MG/DL (ref 70–99)
GLUCOSE BLDC GLUCOMTR-MCNC: 275 MG/DL (ref 70–99)
GLUCOSE BLDC GLUCOMTR-MCNC: 278 MG/DL (ref 70–99)
GLUCOSE BLDC GLUCOMTR-MCNC: 287 MG/DL (ref 70–99)
GLUCOSE BLDC GLUCOMTR-MCNC: 298 MG/DL (ref 70–99)
GLUCOSE SERPL-MCNC: 307 MG/DL (ref 70–99)
HBA1C MFR BLD: 4.3 % (ref 0–5.6)
HCT VFR BLD AUTO: 33.7 % (ref 40–53)
HGB BLD-MCNC: 10.3 G/DL (ref 13.3–17.7)
HGB BLD-MCNC: 10.5 G/DL (ref 13.3–17.7)
HGB BLD-MCNC: 10.6 G/DL (ref 13.3–17.7)
HGB BLD-MCNC: 10.9 G/DL (ref 13.3–17.7)
LYMPHOCYTES # BLD AUTO: 0 10E9/L (ref 0.8–5.3)
LYMPHOCYTES NFR BLD AUTO: 0 %
MACROCYTES BLD QL SMEAR: PRESENT
MAGNESIUM SERPL-MCNC: 1.8 MG/DL (ref 1.6–2.3)
MCH RBC QN AUTO: 33.1 PG (ref 26.5–33)
MCHC RBC AUTO-ENTMCNC: 32.3 G/DL (ref 31.5–36.5)
MCV RBC AUTO: 102 FL (ref 78–100)
MONOCYTES # BLD AUTO: 0.1 10E9/L (ref 0–1.3)
MONOCYTES NFR BLD AUTO: 1.7 %
NEUTROPHILS # BLD AUTO: 5.3 10E9/L (ref 1.6–8.3)
NEUTROPHILS NFR BLD AUTO: 98.3 %
ORGAN: NORMAL
PHOSPHATE SERPL-MCNC: 4.7 MG/DL (ref 2.5–4.5)
PLATELET # BLD AUTO: 173 10E9/L (ref 150–450)
PLATELET # BLD EST: ABNORMAL 10*3/UL
POTASSIUM SERPL-SCNC: 3.9 MMOL/L (ref 3.4–5.3)
POTASSIUM SERPL-SCNC: 4 MMOL/L (ref 3.4–5.3)
POTASSIUM SERPL-SCNC: 4.1 MMOL/L (ref 3.4–5.3)
POTASSIUM SERPL-SCNC: 4.1 MMOL/L (ref 3.4–5.3)
RBC # BLD AUTO: 3.29 10E12/L (ref 4.4–5.9)
SODIUM SERPL-SCNC: 130 MMOL/L (ref 133–144)
WBC # BLD AUTO: 5.4 10E9/L (ref 4–11)

## 2019-12-20 PROCEDURE — 36415 COLL VENOUS BLD VENIPUNCTURE: CPT | Performed by: SURGERY

## 2019-12-20 PROCEDURE — 84100 ASSAY OF PHOSPHORUS: CPT | Performed by: SURGERY

## 2019-12-20 PROCEDURE — 25000132 ZZH RX MED GY IP 250 OP 250 PS 637: Mod: GY | Performed by: PHYSICIAN ASSISTANT

## 2019-12-20 PROCEDURE — 85025 COMPLETE CBC W/AUTO DIFF WBC: CPT | Performed by: SURGERY

## 2019-12-20 PROCEDURE — 25000128 H RX IP 250 OP 636: Performed by: PHYSICIAN ASSISTANT

## 2019-12-20 PROCEDURE — 00000146 ZZHCL STATISTIC GLUCOSE BY METER IP

## 2019-12-20 PROCEDURE — 25000125 ZZHC RX 250: Performed by: PHYSICIAN ASSISTANT

## 2019-12-20 PROCEDURE — 36592 COLLECT BLOOD FROM PICC: CPT | Performed by: SURGERY

## 2019-12-20 PROCEDURE — 25000131 ZZH RX MED GY IP 250 OP 636 PS 637: Mod: GY | Performed by: SURGERY

## 2019-12-20 PROCEDURE — 25800030 ZZH RX IP 258 OP 636: Performed by: SURGERY

## 2019-12-20 PROCEDURE — 83036 HEMOGLOBIN GLYCOSYLATED A1C: CPT | Performed by: SURGERY

## 2019-12-20 PROCEDURE — 25000131 ZZH RX MED GY IP 250 OP 636 PS 637: Mod: GY | Performed by: INTERNAL MEDICINE

## 2019-12-20 PROCEDURE — 84132 ASSAY OF SERUM POTASSIUM: CPT | Performed by: SURGERY

## 2019-12-20 PROCEDURE — 99232 SBSQ HOSP IP/OBS MODERATE 35: CPT | Performed by: NURSE PRACTITIONER

## 2019-12-20 PROCEDURE — 85018 HEMOGLOBIN: CPT | Performed by: SURGERY

## 2019-12-20 PROCEDURE — 83735 ASSAY OF MAGNESIUM: CPT | Performed by: SURGERY

## 2019-12-20 PROCEDURE — 25800029 ZZH RX IP 258 OP 250: Performed by: SURGERY

## 2019-12-20 PROCEDURE — 25000131 ZZH RX MED GY IP 250 OP 636 PS 637: Mod: GY | Performed by: PHYSICIAN ASSISTANT

## 2019-12-20 PROCEDURE — 12000026 ZZH R&B TRANSPLANT

## 2019-12-20 PROCEDURE — 80048 BASIC METABOLIC PNL TOTAL CA: CPT | Performed by: SURGERY

## 2019-12-20 PROCEDURE — 25800030 ZZH RX IP 258 OP 636: Performed by: PHYSICIAN ASSISTANT

## 2019-12-20 PROCEDURE — 25000132 ZZH RX MED GY IP 250 OP 250 PS 637: Mod: GY | Performed by: SURGERY

## 2019-12-20 PROCEDURE — 25000132 ZZH RX MED GY IP 250 OP 250 PS 637: Mod: GY | Performed by: STUDENT IN AN ORGANIZED HEALTH CARE EDUCATION/TRAINING PROGRAM

## 2019-12-20 RX ORDER — DEXTROSE MONOHYDRATE 25 G/50ML
25-50 INJECTION, SOLUTION INTRAVENOUS
Status: DISCONTINUED | OUTPATIENT
Start: 2019-12-20 | End: 2019-12-20

## 2019-12-20 RX ORDER — ALBUTEROL SULFATE 90 UG/1
2 AEROSOL, METERED RESPIRATORY (INHALATION) EVERY 6 HOURS PRN
Status: DISCONTINUED | OUTPATIENT
Start: 2019-12-20 | End: 2019-12-22 | Stop reason: HOSPADM

## 2019-12-20 RX ORDER — MYCOPHENOLATE MOFETIL 250 MG/1
750 CAPSULE ORAL
Status: DISCONTINUED | OUTPATIENT
Start: 2019-12-20 | End: 2019-12-22 | Stop reason: HOSPADM

## 2019-12-20 RX ORDER — AMOXICILLIN 250 MG
1 CAPSULE ORAL 2 TIMES DAILY
Status: DISCONTINUED | OUTPATIENT
Start: 2019-12-20 | End: 2019-12-22 | Stop reason: HOSPADM

## 2019-12-20 RX ORDER — ACETAMINOPHEN 325 MG/1
650 TABLET ORAL EVERY 4 HOURS PRN
Status: DISCONTINUED | OUTPATIENT
Start: 2019-12-22 | End: 2019-12-22 | Stop reason: HOSPADM

## 2019-12-20 RX ORDER — DIPHENHYDRAMINE HCL 25 MG
25-50 CAPSULE ORAL ONCE
Status: COMPLETED | OUTPATIENT
Start: 2019-12-20 | End: 2019-12-20

## 2019-12-20 RX ORDER — OXYCODONE HYDROCHLORIDE 5 MG/1
5-10 TABLET ORAL EVERY 4 HOURS PRN
Status: DISCONTINUED | OUTPATIENT
Start: 2019-12-20 | End: 2019-12-20

## 2019-12-20 RX ORDER — NICOTINE POLACRILEX 4 MG
15-30 LOZENGE BUCCAL
Status: DISCONTINUED | OUTPATIENT
Start: 2019-12-20 | End: 2019-12-20

## 2019-12-20 RX ORDER — VALGANCICLOVIR 450 MG/1
450 TABLET, FILM COATED ORAL
Status: DISCONTINUED | OUTPATIENT
Start: 2019-12-23 | End: 2019-12-21

## 2019-12-20 RX ORDER — AMOXICILLIN 250 MG
2 CAPSULE ORAL 2 TIMES DAILY
Status: DISCONTINUED | OUTPATIENT
Start: 2019-12-20 | End: 2019-12-22 | Stop reason: HOSPADM

## 2019-12-20 RX ORDER — ACETAMINOPHEN 325 MG/1
650 TABLET ORAL ONCE
Status: COMPLETED | OUTPATIENT
Start: 2019-12-20 | End: 2019-12-20

## 2019-12-20 RX ORDER — ASPIRIN 81 MG/1
81 TABLET ORAL DAILY
Status: DISCONTINUED | OUTPATIENT
Start: 2019-12-20 | End: 2019-12-22 | Stop reason: HOSPADM

## 2019-12-20 RX ORDER — TACROLIMUS 1 MG/1
2 CAPSULE ORAL
Status: DISCONTINUED | OUTPATIENT
Start: 2019-12-20 | End: 2019-12-20

## 2019-12-20 RX ORDER — NALOXONE HYDROCHLORIDE 0.4 MG/ML
.1-.4 INJECTION, SOLUTION INTRAMUSCULAR; INTRAVENOUS; SUBCUTANEOUS
Status: DISCONTINUED | OUTPATIENT
Start: 2019-12-20 | End: 2019-12-20

## 2019-12-20 RX ORDER — ONDANSETRON 2 MG/ML
4 INJECTION INTRAMUSCULAR; INTRAVENOUS EVERY 6 HOURS PRN
Status: DISCONTINUED | OUTPATIENT
Start: 2019-12-20 | End: 2019-12-21

## 2019-12-20 RX ORDER — ASPIRIN 81 MG/1
81 TABLET ORAL DAILY
Status: DISCONTINUED | OUTPATIENT
Start: 2019-12-20 | End: 2019-12-20

## 2019-12-20 RX ORDER — HYDROMORPHONE HYDROCHLORIDE 2 MG/1
2-4 TABLET ORAL
Status: DISCONTINUED | OUTPATIENT
Start: 2019-12-20 | End: 2019-12-20

## 2019-12-20 RX ORDER — HYDROMORPHONE HYDROCHLORIDE 2 MG/1
2 TABLET ORAL
Status: DISCONTINUED | OUTPATIENT
Start: 2019-12-20 | End: 2019-12-20

## 2019-12-20 RX ORDER — POLYETHYLENE GLYCOL 3350 17 G/17G
17 POWDER, FOR SOLUTION ORAL DAILY
Status: DISCONTINUED | OUTPATIENT
Start: 2019-12-20 | End: 2019-12-22 | Stop reason: HOSPADM

## 2019-12-20 RX ORDER — PANTOPRAZOLE SODIUM 40 MG/1
40 TABLET, DELAYED RELEASE ORAL
Status: DISCONTINUED | OUTPATIENT
Start: 2019-12-20 | End: 2019-12-22 | Stop reason: HOSPADM

## 2019-12-20 RX ORDER — HYDROMORPHONE HYDROCHLORIDE 1 MG/ML
.3-.5 INJECTION, SOLUTION INTRAMUSCULAR; INTRAVENOUS; SUBCUTANEOUS
Status: DISCONTINUED | OUTPATIENT
Start: 2019-12-20 | End: 2019-12-20

## 2019-12-20 RX ORDER — MAGNESIUM OXIDE 400 MG/1
400 TABLET ORAL
Status: DISCONTINUED | OUTPATIENT
Start: 2019-12-21 | End: 2019-12-22 | Stop reason: HOSPADM

## 2019-12-20 RX ORDER — DEXTROSE MONOHYDRATE 25 G/50ML
25-50 INJECTION, SOLUTION INTRAVENOUS
Status: DISCONTINUED | OUTPATIENT
Start: 2019-12-20 | End: 2019-12-22 | Stop reason: HOSPADM

## 2019-12-20 RX ORDER — TACROLIMUS 1 MG/1
3 CAPSULE ORAL
Status: DISCONTINUED | OUTPATIENT
Start: 2019-12-20 | End: 2019-12-22 | Stop reason: HOSPADM

## 2019-12-20 RX ORDER — HEPARIN SODIUM 5000 [USP'U]/.5ML
5000 INJECTION, SOLUTION INTRAVENOUS; SUBCUTANEOUS 3 TIMES DAILY
Status: DISCONTINUED | OUTPATIENT
Start: 2019-12-20 | End: 2019-12-22 | Stop reason: HOSPADM

## 2019-12-20 RX ORDER — ATORVASTATIN CALCIUM 40 MG/1
40 TABLET, FILM COATED ORAL EVERY EVENING
Status: DISCONTINUED | OUTPATIENT
Start: 2019-12-20 | End: 2019-12-22 | Stop reason: HOSPADM

## 2019-12-20 RX ORDER — ONDANSETRON 4 MG/1
4 TABLET, ORALLY DISINTEGRATING ORAL EVERY 6 HOURS PRN
Status: DISCONTINUED | OUTPATIENT
Start: 2019-12-20 | End: 2019-12-22 | Stop reason: HOSPADM

## 2019-12-20 RX ORDER — ATORVASTATIN CALCIUM 10 MG/1
10 TABLET, FILM COATED ORAL DAILY
Status: DISCONTINUED | OUTPATIENT
Start: 2019-12-20 | End: 2019-12-20

## 2019-12-20 RX ORDER — SULFAMETHOXAZOLE AND TRIMETHOPRIM 400; 80 MG/1; MG/1
1 TABLET ORAL DAILY
Status: DISCONTINUED | OUTPATIENT
Start: 2019-12-20 | End: 2019-12-20

## 2019-12-20 RX ORDER — SULFAMETHOXAZOLE AND TRIMETHOPRIM 400; 80 MG/1; MG/1
1 TABLET ORAL
Status: DISCONTINUED | OUTPATIENT
Start: 2019-12-20 | End: 2019-12-21

## 2019-12-20 RX ORDER — NICOTINE POLACRILEX 4 MG
15-30 LOZENGE BUCCAL
Status: DISCONTINUED | OUTPATIENT
Start: 2019-12-20 | End: 2019-12-22 | Stop reason: HOSPADM

## 2019-12-20 RX ORDER — HYDROMORPHONE HYDROCHLORIDE 2 MG/1
2-4 TABLET ORAL
Status: DISCONTINUED | OUTPATIENT
Start: 2019-12-20 | End: 2019-12-22 | Stop reason: HOSPADM

## 2019-12-20 RX ORDER — DIPHENHYDRAMINE HCL 12.5MG/5ML
25-50 LIQUID (ML) ORAL ONCE
Status: COMPLETED | OUTPATIENT
Start: 2019-12-20 | End: 2019-12-20

## 2019-12-20 RX ORDER — ATORVASTATIN CALCIUM 40 MG/1
40 TABLET, FILM COATED ORAL DAILY
Status: DISCONTINUED | OUTPATIENT
Start: 2019-12-20 | End: 2019-12-20

## 2019-12-20 RX ORDER — BISACODYL 10 MG
10 SUPPOSITORY, RECTAL RECTAL DAILY PRN
Status: DISCONTINUED | OUTPATIENT
Start: 2019-12-21 | End: 2019-12-22 | Stop reason: HOSPADM

## 2019-12-20 RX ORDER — SODIUM CHLORIDE, SODIUM LACTATE, POTASSIUM CHLORIDE, CALCIUM CHLORIDE 600; 310; 30; 20 MG/100ML; MG/100ML; MG/100ML; MG/100ML
INJECTION, SOLUTION INTRAVENOUS CONTINUOUS
Status: DISCONTINUED | OUTPATIENT
Start: 2019-12-20 | End: 2019-12-22

## 2019-12-20 RX ORDER — ALBUTEROL SULFATE 90 UG/1
2 AEROSOL, METERED RESPIRATORY (INHALATION) EVERY 6 HOURS PRN
Status: ON HOLD | COMMUNITY
End: 2019-12-22

## 2019-12-20 RX ORDER — NALOXONE HYDROCHLORIDE 0.4 MG/ML
.1-.4 INJECTION, SOLUTION INTRAMUSCULAR; INTRAVENOUS; SUBCUTANEOUS
Status: DISCONTINUED | OUTPATIENT
Start: 2019-12-20 | End: 2019-12-22 | Stop reason: HOSPADM

## 2019-12-20 RX ADMIN — SENNOSIDES AND DOCUSATE SODIUM 2 TABLET: 8.6; 5 TABLET ORAL at 20:18

## 2019-12-20 RX ADMIN — INSULIN ASPART 3 UNITS: 100 INJECTION, SOLUTION INTRAVENOUS; SUBCUTANEOUS at 18:42

## 2019-12-20 RX ADMIN — PANTOPRAZOLE SODIUM 40 MG: 40 TABLET, DELAYED RELEASE ORAL at 16:50

## 2019-12-20 RX ADMIN — TACROLIMUS 2 MG: 1 CAPSULE ORAL at 08:23

## 2019-12-20 RX ADMIN — MYCOPHENOLATE MOFETIL 750 MG: 250 CAPSULE ORAL at 08:23

## 2019-12-20 RX ADMIN — DIPHENHYDRAMINE HYDROCHLORIDE 50 MG: 25 CAPSULE ORAL at 13:01

## 2019-12-20 RX ADMIN — ACETAMINOPHEN 975 MG: 325 TABLET, FILM COATED ORAL at 06:05

## 2019-12-20 RX ADMIN — SULFAMETHOXAZOLE AND TRIMETHOPRIM 1 TABLET: 400; 80 TABLET ORAL at 08:24

## 2019-12-20 RX ADMIN — HYDROMORPHONE HYDROCHLORIDE 2 MG: 2 TABLET ORAL at 03:39

## 2019-12-20 RX ADMIN — SODIUM CHLORIDE 550 ML: 4.5 INJECTION, SOLUTION INTRAVENOUS at 02:12

## 2019-12-20 RX ADMIN — HEPARIN SODIUM 5000 UNITS: 5000 INJECTION, SOLUTION INTRAVENOUS; SUBCUTANEOUS at 20:18

## 2019-12-20 RX ADMIN — HEPARIN SODIUM 5000 UNITS: 5000 INJECTION, SOLUTION INTRAVENOUS; SUBCUTANEOUS at 13:19

## 2019-12-20 RX ADMIN — ASPIRIN 81 MG: 81 TABLET, COATED ORAL at 16:40

## 2019-12-20 RX ADMIN — SENNOSIDES AND DOCUSATE SODIUM 1 TABLET: 8.6; 5 TABLET ORAL at 08:25

## 2019-12-20 RX ADMIN — SODIUM CHLORIDE 1000 ML: 9 INJECTION, SOLUTION INTRAVENOUS at 09:33

## 2019-12-20 RX ADMIN — MYCOPHENOLATE MOFETIL 750 MG: 250 CAPSULE ORAL at 18:18

## 2019-12-20 RX ADMIN — ANTI-THYMOCYTE GLOBULIN (RABBIT) 150 MG: 5 INJECTION, POWDER, LYOPHILIZED, FOR SOLUTION INTRAVENOUS at 13:29

## 2019-12-20 RX ADMIN — SODIUM CHLORIDE 1000 ML: 9 INJECTION, SOLUTION INTRAVENOUS at 03:39

## 2019-12-20 RX ADMIN — POLYETHYLENE GLYCOL 3350 17 G: 17 POWDER, FOR SOLUTION ORAL at 11:57

## 2019-12-20 RX ADMIN — SODIUM CHLORIDE, POTASSIUM CHLORIDE, SODIUM LACTATE AND CALCIUM CHLORIDE: 600; 310; 30; 20 INJECTION, SOLUTION INTRAVENOUS at 21:09

## 2019-12-20 RX ADMIN — ACETAMINOPHEN 975 MG: 325 TABLET, FILM COATED ORAL at 23:07

## 2019-12-20 RX ADMIN — SODIUM CHLORIDE 450 ML: 4.5 INJECTION, SOLUTION INTRAVENOUS at 06:23

## 2019-12-20 RX ADMIN — HUMAN INSULIN 3.5 UNITS/HR: 100 INJECTION, SOLUTION SUBCUTANEOUS at 08:19

## 2019-12-20 RX ADMIN — TACROLIMUS 3 MG: 1 CAPSULE ORAL at 18:18

## 2019-12-20 RX ADMIN — ACETAMINOPHEN 650 MG: 325 TABLET, FILM COATED ORAL at 13:01

## 2019-12-20 RX ADMIN — HUMAN INSULIN 7 UNITS/HR: 100 INJECTION, SOLUTION SUBCUTANEOUS at 17:13

## 2019-12-20 RX ADMIN — SODIUM CHLORIDE, POTASSIUM CHLORIDE, SODIUM LACTATE AND CALCIUM CHLORIDE: 600; 310; 30; 20 INJECTION, SOLUTION INTRAVENOUS at 11:09

## 2019-12-20 RX ADMIN — ATORVASTATIN CALCIUM 40 MG: 40 TABLET, FILM COATED ORAL at 20:17

## 2019-12-20 RX ADMIN — SODIUM CHLORIDE 1000 ML: 4.5 INJECTION, SOLUTION INTRAVENOUS at 09:33

## 2019-12-20 RX ADMIN — HYDROMORPHONE HYDROCHLORIDE 2 MG: 2 TABLET ORAL at 11:57

## 2019-12-20 RX ADMIN — SODIUM CHLORIDE 250 MG: 9 INJECTION, SOLUTION INTRAVENOUS at 13:03

## 2019-12-20 RX ADMIN — ATORVASTATIN CALCIUM 10 MG: 10 TABLET, FILM COATED ORAL at 08:24

## 2019-12-20 RX ADMIN — SODIUM CHLORIDE 1000 ML: 4.5 INJECTION, SOLUTION INTRAVENOUS at 03:39

## 2019-12-20 ASSESSMENT — ACTIVITIES OF DAILY LIVING (ADL)
ADLS_ACUITY_SCORE: 15

## 2019-12-20 ASSESSMENT — MIFFLIN-ST. JEOR: SCORE: 1484.41

## 2019-12-20 NOTE — PROVIDER NOTIFICATION
Admission          12/19/2019 12:54 PM  -----------------------------------------------------------  Reason for admission: living donor kidney transplant  Primary team notified of pt arrival.  Admitted from: PACU  Via: bed  Accompanied by: self  Belongings: Placed in closet; valuables sent home with family  Admission Profile: complete  Teaching: orientation to unit and call light- call light within reach, call don't fall, use of console, meal times, when to call for the RN, and enforced importance of safety   Access: PIV, L internal jugular.   Telemetry: hardwired  Ht./Wt.: complete  2 RN Skin Assessment Completed By: Viry JIMENEZ and Julita VELASQUEZ  Pt status: stable. M    Temp:  [97.4  F (36.3  C)-98.7  F (37.1  C)] 97.5  F (36.4  C)  Pulse:  [80-89] 89  Heart Rate:  [79-93] 90  Resp:  [11-20] 20  BP: (136-166)/(74-95) 155/89  Arterial Line BP: (132-148)/(61-74) 136/74  SpO2:  [97 %-100 %] 100 %

## 2019-12-20 NOTE — PROGRESS NOTES
"POST OP CHECK  December 20, 2019        S: Patient seen at the bedside awake, alert, and interactive. Having some pain at the incision site and a slight headache. No nausea or vomiting.     O: BP (!) 157/92 (BP Location: Right arm)   Pulse 87   Temp 97.4  F (36.3  C) (Oral)   Resp 18   Ht 1.746 m (5' 8.75\")   Wt 73.8 kg (162 lb 11.2 oz)   SpO2 100%   BMI 24.20 kg/m        GEN: NAD, AAO  Cv: Non-cyanotic  ABD: Soft, appropriately tender, without guarding or rebound tenderness. Incision/Dressing c/d/I with minimal shadowing on the dressing.   PSYCH: Cooperative    A/P: 64 year old male with DM2 and ESRD who is s/p LDKT progressing well.     Tylenol, PRN oxycodone, PRN Dilaudid  PRN Zofran  sliding scale insulin  q4h Hgb and K   ADAT    Remainder of cares per primary team.    Mel Rodriguez DO  Surgery PGY-1    "

## 2019-12-20 NOTE — BRIEF OP NOTE
[facility], [place]    Brief Operative Note    Pre-operative diagnosis: ESRD (end stage renal disease) (H) [N18.6]  Post-operative diagnosis Same as pre-operative diagnosis    Procedure: Procedure(s):  Kidney Transplant Non-Directed Recipient  Surgeon: Surgeon(s) and Role:     * Shira Campbell MD - Primary     * Paulina Hooks MD - Resident - Assisting     * Rocky Dwyer MD - Fellow - Assisting  Anesthesia: Combined General with Block   Estimated blood loss: 100 mL  Drains: Javier-Guaman  Specimens: * No specimens in log *  Findings:   large robust lymphatics- drain left with concern for lymph leak. artery deep and atherosclerotic. single vessels, anastomosed to external iliacs. URETERAL STENT USED. .  Complications: None.  Implants:   Implant Name Type Inv. Item Serial No.  Lot No. LRB No. Used   STENT URETERAL PERCUFLEX PLUS [id number] Stent STENT URETERAL PERCUFLEX PLUS [id number]  [employer] [id number] N/A 1

## 2019-12-20 NOTE — PLAN OF CARE
"Neuro: A&Ox4.   Cardiac: SR. VSS.Slightly hypertensive, most likely related to pain. CVP 4 consistently.   Respiratory: Sating 95+ on RA. Capno on. ETCO2 >32 and IPI 8-10.  GI/: Abundant UO. No BM  Diet/appetite: Tolerating clear diet.   Activity:  Assist of 2, up to scale.  Pain: At acceptable level on current regimen. Regimen changed to scheduled tylenol and PRN dilaudid PO q3 hours, per patient request not to have oxycodone (makes him nauseated).  Skin: Midline abd incision from surgery, drainage extending and marked.   LDA's: R internal jugular with brown for CVP. Replacement fluids running as ordered.     Plan: Continue with POC. Notify primary team with changes.    BP (!) 155/93 (BP Location: Right arm)   Pulse 89   Temp 97.5  F (36.4  C) (Oral)   Resp 19   Ht 1.746 m (5' 8.75\")   Wt 73.8 kg (162 lb 11.2 oz)   SpO2 100%   BMI 24.20 kg/m      "

## 2019-12-20 NOTE — ANESTHESIA PROCEDURE NOTES
Central Line Procedure Note  Staff:     Anesthesiologist:  Christine Decker MD  Location: In OR after induction  Procedure Start/Stop Times:     patient identified, IV checked, site marked, risks and benefits discussed, informed consent, monitors and equipment checked, pre-op evaluation and at physician/surgeon's request      Correct Patient: Yes      Correct Position: Yes      Correct Site: Yes      Correct Procedure: Yes      Correct Laterality:  Yes    Site Marked:  Yes  Line Placement:     Procedure:  Central Line    Insertion laterality:  Left    Insertion site:  Internal Jugular    Position:  Trendelenburg      Maximal Sterile Barriers: All elements of maximal sterile barrier technique followed      (Maximal sterile barriers include:   Sterile gown, Sterile Gloves, Mask, Cap, Whole body draped, hand hygiene and acceptable skin prep).Skin Prep: Chloraprep         Injection Technique:  Ultrasound guided    Sterile Ultrasound Technique:  Sterile probe cover and Sterile gel    Vein evaluated via U/S for patency/adequacy of catheter insertion and is adequate.  Using realtime U/S imaging the vein was punctured, and needle was observed entering vein on U/S      Permanent Image entered into patient's record      Local skin infiltration:  None    Catheter size:  7 Fr, 3 lumen, 20 cm    Catheter length at skin (cm):  18    Cath secured with: suture      Dressing:  Tegaderm and Biopatch    Complications:  None obvious    Blood aspirated all lumens: Yes      All Lumens Flushed: Yes      Verification method:  Placement to be verified post-op and Ultrasound

## 2019-12-20 NOTE — PHARMACY-ADMISSION MEDICATION HISTORY
Admission medication history interview status for the 12/19/2019 admission is complete. See Epic admission navigator for allergy information, pharmacy, prior to admission medications and immunization status.     Medication history interview sources:  patient and pharmacy fill history via EPIC    Changes made to PTA medication list (reason)  Added: na  Deleted: na  Changed:  -Lisinopril 15mg once daily --> twice daily  -Pantoprazole 40mg twice daily --> once daily    Additional medication history information (including reliability of information, actions taken by pharmacist): Patient is a reliable historian and had his medication list with him      Prior to Admission medications    Medication Sig Last Dose Taking? Auth Provider   acetaminophen (TYLENOL) 500 MG tablet Take 2 tablets (1,000 mg) by mouth 4 times daily  Patient taking differently: Take 1,000 mg by mouth every 6 hours as needed for mild pain  12/18/2019 at Unknown time Yes Rick Tran MD   albuterol (PROAIR HFA/PROVENTIL HFA/VENTOLIN HFA) 108 (90 Base) MCG/ACT inhaler Inhale 2 puffs into the lungs every 6 hours as needed for shortness of breath / dyspnea or wheezing  Yes Unknown, Entered By History   amLODIPine (NORVASC) 10 MG tablet Take 1 tablet (10 mg) by mouth daily 12/18/2019 at Unknown time Yes Klever Ernst MD   aspirin 81 MG EC tablet Take 81 mg by mouth daily Past Month at Unknown time Yes Reported, Patient   atorvastatin (LIPITOR) 40 MG tablet Take 1 tablet (40 mg) by mouth daily 12/18/2019 at Unknown time Yes Klever Ernst MD   biotin (BIOTIN 5000) 5 MG CAPS Take 5 mg by mouth daily 12/18/2019 at Unknown time Yes Jennifer Reid, PADariuszC   hydrALAZINE (APRESOLINE) 100 MG TABS tablet Take 1 tablet (100 mg) by mouth every 8 hours 12/19/2019 at Unknown time Yes Klever Ernst MD   lisinopril (PRINIVIL/ZESTRIL) 10 MG tablet Take ONE 5 mg tab with ONE 10 mg tab daily (to equal 15 mg daily).  Patient taking  differently: 15 mg 2 times daily Take ONE 5 mg tab with ONE 10 mg tab twice daily (to equal 15 mg twice daily). 12/18/2019 at Unknown time Yes Klever Ernst MD   lisinopril (PRINIVIL/ZESTRIL) 5 MG tablet Take ONE 5 mg tab with ONE 10 mg tab daily (to equal 15 mg daily).  Patient taking differently: 5 mg 2 times daily Take ONE 5 mg tab with ONE 10 mg tab twice daily (to equal 15 mg twice daily). 12/18/2019 at Unknown time Yes Klever Ernst MD   melatonin 1 MG TABS tablet Take 2 tablets (2 mg) by mouth nightly as needed for sleep 12/18/2019 at Unknown time Yes Mellisa Suh APRN CNP   mycophenolate (GENERIC EQUIVALENT) 250 MG capsule Take 2 capsules (500 mg) by mouth 2 times daily 12/19/2019 at Unknown time Yes Klever Ernst MD   NEPHROCAPS 1 MG capsule Take 1 capsule by mouth daily 12/18/2019 at Unknown time Yes Mary Alice Andre APRN CNP   pantoprazole (PROTONIX) 40 MG EC tablet Take 1 tablet (40 mg) by mouth 2 times daily (before meals)  Patient taking differently: Take 40 mg by mouth daily  12/18/2019 at Unknown time Yes Klever Ernst MD   tacrolimus (GENERIC EQUIVALENT) 1 MG capsule Take two capsules in the AM (2 mg) and two capsules in the PM (2 mg) 12/19/2019 at Unknown time Yes Klever Ernst MD   fluticasone (FLONASE) 50 MCG/ACT nasal spray Spray 1 spray into both nostrils daily  Patient taking differently: Spray 1 spray into both nostrils daily as needed  More than a month at Unknown time  Navid Rodriguez MD   lidocaine-prilocaine (EMLA) 2.5-2.5 % external cream apply 1 hour prior to dialysis   Reported, Patient   mupirocin (BACTROBAN) 2 % external ointment Apply topically 3 times daily More than a month at Unknown time  Yahir Turcios MD         Medication history completed by: Morgan Mark, EllisD

## 2019-12-20 NOTE — CONSULTS
Transplant Admission Psychosocial Assessment    Patient Name: Murray Nicholson  : 1955  Age: 64 year old  MRN: [id number]  Completed assessment with: Patient    Patient underwent Kidney transplant.  Met with patient to update psychosocial assessment and provide brief education about SW role while inpatient, as well as expectations/requirements and follow up needs post-transplant. SW also provided education about need for compliance with transplant medications, and explained ESRD Medicare benefits and medication coverage under Medicare part B.  Medicare [id number] forms completed and signed by patient.  Presenting Information   Living Situation: Lives alone in an apartment in [place]  If not local, plans for short term stay:  Lives local  Previous Functional Status: Independent with with ADLs and had been driving back and forth to dialysis.  Cultural/Language/Spiritual Considerations: None    Support System  Primary Support Person FriendNaresh  Other support:  2 Adult Sons, Jasper and Jordi  Plan for support in immediate post-transplant period: Friend and Sons will provide-patient has been through heart transplant in 2018 and had support from this same group who provided 24-hour supervision post-heart transplant.    Health Care Directive  Decision Maker: Patient  Alternate Decision Maker: Son, Jasper, then other Son  Health Care Directive: Copy in Chart    Mental Health/Coping:   History of Mental Health: None  History of Chemical Health: None  Current status: N/A  Coping: Bright affect and good coping skills. Reports no history of mental health issues and has been able to cope with heart failure, the need for either LVAD or transplant, kidney failure and need for dialysis long-term until recent kidney transplant. Confirms has had great attitude, but recently experienced some emotional distress through the loss of fellow dialysis-goers, and receiving the call that he was going to be able to get a kidney.  Appropriate emotional response. Coping appropriately.  Services Needed/Recommended: None identified/recommended    Financial   Income: Social Security  Impact of transplant on income: No impact. Has been through heart transplant and on immunosuppression medications  Insurance and medication coverage: Medicare and supplement  Financial concerns: None, but patient states that if [employer] has copay for many months, would not be able to afford. Will   Resources needed: May need [employer] patient assistance program. Patient signed application. Need MD team to fill out prescription part.    Assessment, recommendations and discharge plan:  Writer familiar with patient from heart transplant last Summer. Lives alone, but has good support from friend and Sons. No SW concerns for discharge. Aware that he can't drive. Friend will provide transportation to and from appointments in clinic. Looking forward to living off dialysis. Will remain available if needs arise.

## 2019-12-20 NOTE — DISCHARGE SUMMARY
[facility], [place]    Discharge Summary  Transplant Surgery    Date of Admission:  12/19/2019  Date of Discharge:  12/22/2019  Discharging Provider: Dr. Campbell  Date of Service (when I saw the patient): 12/22/19    Discharge Diagnoses   Diabetes, hypertension s/p heart transplant with resultant end stage renal disease, now s/p live donor kidney transplant    Procedure/Surgery Information   Procedure: Procedure(s):  Kidney Transplant Non-Directed Recipient with ureteral stent placement   Surgeon(s): Surgeon(s) and Role:     * Shira Campbell MD - Primary     * Paulina Hooks MD - Resident - Assisting     * Rocky Dwyer MD - Fellow - Assisting       Non-operative procedures None performed     History of Present Illness   Murray Nicholson is a 64 year old male who presented with end stage renal disease in the setting of diabetes, hypertension, and prior heart transplant. He has had issues with overimmunosuppression previously requiring colectomy for CMV.     Hospital Course   Murray Nicholson was admitted on 12/19/2019 to undergo a live (import) donor kidney transplant. A double J ureteral stent was placed at the time of surgery. The kidney was making good urine after perfusion. Cr declined to 1.1 on the day of discharge.   Patient completed induction immunosuppression with thymoglobulin 4mg/kg along with steroid burst. Notably his induction was curtailed due to prior heart transplant and issues with CMV colitis requiring surgery. He was started on  mg BID and tacrolimus for maintenance immunosuppression, and tacrolimus level prior to discharge was 7.5 (11 hour trough) (goal level 8-10). Currently on 3mg BID.     Transplant coordinator: unknown [phone]  DSA at time of transplant: no  Ureteral stent: Yes  CMV: Donor +  /Recipient + (converted after heart transplant)  EBV: Donor + / Recipient +  Thymoglobulin: 300 mg (4 mg/kg)    Post-operative  pain control: included IV dilaudid and PO oxycodone/tylenol. He will be discharged on PO oxycodone and tylenol     DMII: Insulin gtt post operatively due to solu-medrol taper. Endocrinology consulted and patient will discharge on Lantus 18 units daily. IF FBG <80 in the AM, he is to decrease dose by 2 units. Plan to follow up with endocrinology in clinic in 2 weeks.     Imaging study follow up needs:   -None performed    Significant Findings: Creatinine decreasing- 1.1 on day of discharge    Discharge Disposition   Discharged to home   Condition at discharge: Stable    Pending Results   These results will be followed up by transplant fellow  Unresulted Labs Ordered in the Past 30 Days of this Admission     Date and Time Order Name Status Description    12/6/2019 1147 HLA INTERIM CROSSMATCH RECIPIENT In process         Final pathology results: No pathology submitted  Primary Care Physician   Yahir Turcios    Physical Exam   Temp: 98.7  F (37.1  C) Temp src: Oral BP: (!) 156/100 Pulse: 98 Heart Rate: 82 Resp: 16 SpO2: 98 % O2 Device: None (Room air)    Vitals:    12/20/19 0500 12/21/19 0629 12/22/19 0354   Weight: 70.8 kg (156 lb 1.4 oz) 70.4 kg (155 lb 3.3 oz) 70 kg (154 lb 6.4 oz)     Vital Signs with Ranges  Temp:  [97.8  F (36.6  C)-98.7  F (37.1  C)] 98.7  F (37.1  C)  Pulse:  [91-98] 98  Heart Rate:  [82-91] 82  Resp:  [16-18] 16  BP: (133-163)/() 156/100  SpO2:  [98 %-100 %] 98 %  I/O last 3 completed shifts:  In: 870 [P.O.:840; I.V.:30]  Out: 3695 [Urine:3600; Drains:95]    Alert, oriented. No distress  Normocephalic, no icterus  nonlabored respirations, LCTAB  NSR  abd soft. Incision c/d/i, staples in place, no drainage  Ext wwp, palpable pulses. Minimal swelling  Skin warm, dry  No tremor    Consultations This Hospital Stay   SOT MEDICATION HISTORY IP PHARMACY CONSULT  SOCIAL WORK IP CONSULT  PHARMACY IP CONSULT  NUTRITION SERVICES ADULT IP CONSULT  NEPHROLOGY KIDNEY/PANCREAS TRANSPLANT ADULT IP  CONSULT  PHARMACY IP CONSULT  ENDOCRINE DIABETES ADULT IP CONSULT    Time Spent on this Encounter   I have spent greater than 30 minutes on this discharge.    Discharge Orders       Home care nursing referral      MD face to face encounter    Documentation of Face to Face and Certification for Home Health Services    I certify that patient: Murray Nicholson is under my care and that I, or a nurse practitioner or physician's assistant working with me, had a face-to-face encounter that meets the physician face-to-face encounter requirements with this patient on: 12/22/19    This encounter with the patient was in whole, or in part, for the following medical condition, which is the primary reason for home health care: Kidney Transplant    I certify that, based on my findings, the following services are medically necessary home health services: Nursing    Further, I certify that my clinical findings support that this patient is homebound, absences from home require considerable and taxing effort and are for medical reasons or Faith services or infrequently or of short duration when for other reasons.    Based on the above findings. I certify that this patient is confined to the home and needs intermittent skilled nursing care, physical therapy and/or speech therapy.  The patient is under my care, and I have initiated the establishment of the plan of care.  This patient will be followed by a physician who will periodically review the plan of care.  Physician/Provider to provide follow up care: Yahir Turcios    Attending hospital physician (the Medicare certified [place] provider):   Physician Signature: See electronic signature associated with these discharge orders.  Date: 12/22/2019     Reason for your hospital stay    Kidney transplant.     Follow Up and recommended labs and tests    Over the next 3-5 days you will be seen in the [facility] at 7 am (ph. [phone], option 7) .  Your labs will be drawn  at the beginning of your appointment at 7:00 am.  DO NOT take your medications prior to having labs drawn. Please bring all your medications with you from home to take after labs are drawn.    LABS:  CBC, BMP, Mg, Phos and Tacrolimus to be drawn daily while in ATC, then every Monday, Thursday by home health care nurse if arranged, or at an outpatient lab.   Tacrolimus levels (12 hours after administration) daily while in ATC then 2 times weekly.     FOLLOW UP APPOINTMENTS:  Remember to always bring an updated medication list to all appointments.     An appointment with your transplant surgeon will be scheduled for approximately 2 weeks following discharge from the hospital.  Your transplant surgeon is: Dr. Campbell.  You will follow up with transplant nephrology in clinic as scheduled.   Follow up with endocrinology clinic in 2 weeks.   Follow up with primary care provider in 4-8 weeks. (Pt to schedule)  You have a ureteral stent in place which needs to be removed in 4-6 weeks.  You will be scheduled for stent removal.  If a  does not contact you for this, please contact your transplant coordinator.    Call scheduling at [phone] if you have not heard about your appointments within 48 hours after discharge.  If you have staples in place, they will be removed in 3 weeks after operation.  WHEN TO CONTACT YOUR  COORDINATOR:  Transplant Coordinator [phone]  Notify your coordinator if you have pain over your kidney, increased redness or drainage from your incision, fever greater than 100.5F, or decreased urine output.  Notify your coordinator immediately if you are ever unable to take your immunosuppressive medications for any reason.  If it is outside of office hours, please call the hospital switchboard at [phone] and ask to have the kidney transplant surgery fellow paged for urgent medical questions, or present to the emergency department.    OTHER DISCHARGE INSTRUCTIONS:  PAULINA plan: Please  monitor color and amount of output. Drain will remain in place until output is <30 ml over 24 hours x 2 days.     Monitor blood pressure and weight daily initially post transplant.  Monitor blood glucose levels 4 times a day  Walk at least four times a day, lift no greater than 10 pounds for 6-8 weeks from the time of surgery.  No driving while taking narcotics or 3 weeks after surgery.    Diet recommendations post-transplant: Heart healthy dietary habits long term (low saturated/trans fat, low sodium). High protein diet x 8 weeks. Practice food safety precautions.    Discharged to home     Adult [facility] Follow-up and recommended labs and tests    Follow up in endocrinology clinic in 2 weeks.     Follow up in transplant surgery clinic with Dr. Campbell in 1-2 weeks.     Appointments on [place] and/or [place] campus (with [facility] or [facility] provider or service). Call [phone] if you haven't heard regarding these appointments within 7 days of discharge.     Tubes and drains    You are going home with the following tubes or drains: PAULINA.  Tube cares per hospital or home care instructions     Wound care and dressings    Instructions to care for your wound at home: keep wound clean and dry and may get incision wet in shower but do not soak or scrub.         Discharge Medications   Current Discharge Medication List      START taking these medications    Details   calcitRIOL (ROCALTROL) 0.25 MCG capsule Take 1 capsule (0.25 mcg) by mouth daily  Qty: 30 capsule, Refills: 3    Associated Diagnoses: Kidney replaced by transplant      HYDROmorphone (DILAUDID) 2 MG tablet Take 1 tablet (2 mg) by mouth every 4 hours as needed for moderate to severe pain  Qty: 20 tablet, Refills: 0    Associated Diagnoses: Kidney replaced by transplant      insulin glargine (LANTUS PEN) 100 UNIT/ML pen Inject 18 Units Subcutaneous every morning  Qty: 3 mL, Refills: 1    Comments: If FBG <80 in the AM decrease dose by 2 units.     If Lantus  is not covered by insurance, may substitute Basaglar at same dose and frequency.  Associated Diagnoses: Kidney replaced by transplant      magnesium oxide (MAG-OX) 400 MG tablet Take 1 tablet (400 mg) by mouth daily (with lunch)  Qty: 30 tablet, Refills: 2    Associated Diagnoses: Kidney replaced by transplant      ondansetron (ZOFRAN-ODT) 4 MG ODT tab Take 1 tablet (4 mg) by mouth every 6 hours as needed for nausea or vomiting  Qty: 10 tablet, Refills: 1    Associated Diagnoses: Kidney replaced by transplant      polyethylene glycol (MIRALAX/GLYCOLAX) packet Take 17 g by mouth daily  Qty: 10 packet, Refills: 1    Associated Diagnoses: Kidney replaced by transplant      senna-docusate (SENOKOT-S/PERICOLACE) 8.6-50 MG tablet Take 2 tablets by mouth 2 times daily  Qty: 50 tablet, Refills: 1    Associated Diagnoses: Kidney replaced by transplant      sulfamethoxazole-trimethoprim (BACTRIM/SEPTRA) 400-80 MG tablet Take 1 tablet by mouth daily  Qty: 30 tablet, Refills: 11    Associated Diagnoses: Kidney replaced by transplant      valGANciclovir (VALCYTE) 450 MG tablet Take 2 tablets (900 mg) by mouth daily  Qty: 60 tablet, Refills: 2    Associated Diagnoses: Kidney replaced by transplant      Vitamin D3 (CHOLECALCIFEROL) 25 mcg (1000 units) tablet Take 1 tablet (25 mcg) by mouth daily  Qty: 30 tablet, Refills: 1    Associated Diagnoses: Kidney replaced by transplant         CONTINUE these medications which have CHANGED    Details   acetaminophen (TYLENOL) 325 MG tablet Take 2 tablets (650 mg) by mouth every 4 hours as needed for other (multimodal surgical pain management along with NSAIDS and opioid medication as indicated based on pain control and physical function.)  Qty: 50 tablet, Refills: 1    Associated Diagnoses: Kidney replaced by transplant      mycophenolate (GENERIC EQUIVALENT) 250 MG capsule Take 3 capsules (750 mg) by mouth 2 times daily  Qty: 180 capsule, Refills: 11    Associated Diagnoses: Kidney  replaced by transplant      pantoprazole (PROTONIX) 40 MG EC tablet Take 1 tablet (40 mg) by mouth 2 times daily (before meals)  Qty: 60 tablet, Refills: 2    Associated Diagnoses: Kidney replaced by transplant      tacrolimus (GENERIC EQUIVALENT) 1 MG capsule Take 3 capsules (3 mg) by mouth 2 times daily  Qty: 180 capsule, Refills: 11    Associated Diagnoses: Kidney replaced by transplant         CONTINUE these medications which have NOT CHANGED    Details   aspirin 81 MG EC tablet Take 81 mg by mouth daily      atorvastatin (LIPITOR) 40 MG tablet Take 1 tablet (40 mg) by mouth daily  Qty: 90 tablet, Refills: 3    Associated Diagnoses: Heart replaced by transplant (H)      melatonin 1 MG TABS tablet Take 2 tablets (2 mg) by mouth nightly as needed for sleep  Qty: 120 tablet, Refills: 1    Associated Diagnoses: Heart transplanted (H)      fluticasone (FLONASE) 50 MCG/ACT nasal spray Spray 1 spray into both nostrils daily  Qty: 11.1 mL, Refills: 11    Comments: Okay to dispense generic equivalent, other formulation, or similar medication covered by patient's insurance  Associated Diagnoses: Runny nose      mupirocin (BACTROBAN) 2 % external ointment Apply topically 3 times daily  Qty: 15 g, Refills: 0    Associated Diagnoses: Skin ulcer, limited to breakdown of skin (H)         STOP taking these medications       albuterol (PROAIR HFA/PROVENTIL HFA/VENTOLIN HFA) 108 (90 Base) MCG/ACT inhaler Comments:   Reason for Stopping:         amLODIPine (NORVASC) 10 MG tablet Comments:   Reason for Stopping:         biotin (BIOTIN 5000) 5 MG CAPS Comments:   Reason for Stopping:         hydrALAZINE (APRESOLINE) 100 MG TABS tablet Comments:   Reason for Stopping:         lidocaine-prilocaine (EMLA) 2.5-2.5 % external cream Comments:   Reason for Stopping:         lisinopril (PRINIVIL/ZESTRIL) 10 MG tablet Comments:   Reason for Stopping:         lisinopril (PRINIVIL/ZESTRIL) 5 MG tablet Comments:   Reason for Stopping:          NEPHROCAPS 1 MG capsule Comments:   Reason for Stopping:                  Home care nursing referral      MD face to face encounter    Documentation of Face to Face and Certification for Home Health Services    I certify that patient: Murray Nicholson is under my care and that I, or a nurse practitioner or physician's assistant working with me, had a face-to-face encounter that meets the physician face-to-face encounter requirements with this patient on: 12/22/19    This encounter with the patient was in whole, or in part, for the following medical condition, which is the primary reason for home health care: Kidney Transplant    I certify that, based on my findings, the following services are medically necessary home health services: Nursing    Further, I certify that my clinical findings support that this patient is homebound, absences from home require considerable and taxing effort and are for medical reasons or Muslim services or infrequently or of short duration when for other reasons.    Based on the above findings. I certify that this patient is confined to the home and needs intermittent skilled nursing care, physical therapy and/or speech therapy.  The patient is under my care, and I have initiated the establishment of the plan of care.  This patient will be followed by a physician who will periodically review the plan of care.  Physician/Provider to provide follow up care: Yahir Turcios    Attending [facility] physician (the Medicare certified [place] provider):   Physician Signature: See electronic signature associated with these discharge orders.  Date: 12/22/2019         Data   Most Recent 3 CBC's:  Recent Labs   Lab Test 12/22/19  0458 12/21/19  0554 12/21/19  0113  12/20/19  0514   WBC 3.5* 6.4  --   --  5.4   HGB 10.1* 10.1* 10.3*   < > 10.9*   * 104*  --   --  102*    181  --   --  173    < > = values in this interval not displayed.      Most Recent 3 BMP's:  Recent Labs   Lab Test  12/22/19  0458 12/21/19  0554 12/21/19  0113  12/20/19  0514    137  --   --  130*   POTASSIUM 3.8 4.2 4.2   < > 4.0   CHLORIDE 110* 109  --   --  98   CO2 24 24  --   --  21   BUN 22 33*  --   --  42*   CR 1.14 1.65*  --   --  4.89*   ANIONGAP 4 4  --   --  11   TRINI 8.5 8.8  --   --  8.7   GLC 78 110*  --   --  307*    < > = values in this interval not displayed.     Most Recent 2 LFT's:  Recent Labs   Lab Test 12/10/19  1121 10/28/19  0945   AST 11 11   ALT 16 14   ALKPHOS 372* 318*   BILITOTAL 1.0 0.7     Most Recent INR's and Anticoagulation Dosing History:  Anticoagulation Dose History     Recent Dosing and Labs Latest Ref Rng & Units 9/3/2018 9/10/2018 5/8/2019 8/9/2019 9/25/2019 11/22/2019 12/10/2019    INR 0.86 - 1.14 1.05 1.05 1.07 1.13 1.13 1.17(H) 1.15(H)    INR Point of Care 0.86 - 1.14 - - - - - - -    Factor 2 60 - 140 % - - - - - - -        Most Recent 3 Troponin's:  Recent Labs   Lab Test 07/26/18  1732 02/03/18  0613 02/02/18  1736   TROPI 0.033 0.247* 0.247*     Most Recent Cholesterol Panel:  Recent Labs   Lab Test 10/28/19  0945   CHOL 120   LDL 37   HDL 58   TRIG 130     Most Recent 6 Bacteria Isolates From Any Culture (See EPIC Reports for Culture Details):  Recent Labs   Lab Test 10/05/18  0844 10/05/18  0843 10/03/18  1407 09/19/18  1654 09/19/18  1330 09/16/18  1444   CULT No growth No growth <10,000 colonies/mL  urogenital tiffany    Susceptibility testing not routinely done Moderate growth  Klebsiella pneumoniae  *  Plus  Heavy growth  Normal oral tiffany   Canceled, Test credited  swab not recieved in containter   Notification of test cancellation was given to  Aminah Wang RN @ 2928 9/19/18 CS   No growth     Most Recent TSH, T4 and A1c Labs:  Recent Labs   Lab Test 12/20/19  0814  04/16/18  1546   TSH  --   --  2.25   A1C 4.3   < >  --     < > = values in this interval not displayed.

## 2019-12-20 NOTE — PROGRESS NOTES
Patient removed from [facility] waitlist after living donor kidney transplant. [facility]OS ID [id number].   Donor PHS increased risk: n/a.   If Yes, MD documentation n/a.    Electronically filed by Jacquelin Ocampo RN 12/19/2019  10:13 PM

## 2019-12-20 NOTE — PHARMACY-TRANSPLANT NOTE
Adult Kidney Transplant Post Operative Note    64 year old male s/p living unrelated donor kidney transplant on 12/19/19 for ESRD due to presumed diabetic nephropathy and hypertension..      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    MAINTENANCE:  - Mycophenolate 750 mg PO BID (non-AA)  - Tacrolimus, goal trough levels of 8-10 mcg/L for first 6 months post- kidney transplant.    Opportunistic pathogen prophylaxis:  CMV D+/R+ valganciclovir renally adjusted for duration of 3 months  EBV D+/R+  PJP ppx: Bactrim renally adjusted for indefinite duration    Patient is not enrolled in medication study.    Pharmacy will monitor for medication interactions and immunosuppression levels in conjunction with the team. Medication therapy needs for discharge planning will continue to be addressed throughout the current admission via multidisciplinary rounds and order review.  Pharmacy will make recommendations as appropriate.    Morgan Mark PharmD  PGY2 Infectious Disease Pharmacy Resident  December 18, 2019 9:23 AM  Pager: [phone]    I attest that the information provided in this note by the pharmacy  resident is complete and accurate    Carlos Owens PharmD  Inpatient Clinical Pharmacist

## 2019-12-20 NOTE — PROGRESS NOTES
Transplant Surgery  Inpatient Daily Progress Note  12/20/2019    Assessment & Plan: 64 year old male with history of end stage kidney disease secondary to presumed diabetic nephropathy and hypertension. Initiated on HD July 2017.  He presently dialyzes THS at [facility] dialysis unit via a LUE AV graft.  His EDW ~ 71.5 kg.  Patient's cardiac function has been stable on tacrolimus and mycophenolate mofetil.  Admitted after LDKT on 12/19.     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    Graft function:  Kidney transplant: Cr 4.89 decreased from 7.7. Good UO  Heart transplant: Cardiology team aware of admit. Recommend continue lipitor, ASA and PPI. Discussed plan for IS with team.   Neuro:  Acute surgical pain: tylenol scheduled. Dilaudid 2-4 mg PRN  Immunosuppression management:  Thymo induction: 150 mg intra-op, 150 mg POD 1 - completed. Total thymo: 4.1 mg/kg  Solu-medrol 500 mg intra-op, 250 mg POD 1 - completed  Mycophenolate 750 mg BID   Tac 3 mg BID    Complexity of management:Medium. Contributing factors: induction  Hematology: Hgb stable  Cardiorespiratory: Stable.  Hx heart transplant: continue Lipitor, ASA  HTN: PTA amlodipine, hydralazine, lisinopril. SBP 130s-160s. No antihypertensives restarted.   GI/Nutrition: Regular diet. Senna/colace BID. Miralax daily.    Endocrine: DM type 2. Insulin gtt. Endocrine consulted. Has been on subcutaneous insulin in the past.   Fluid/Electrolytes: MIVF:  ml/hr. Electrolytes replete  :  "Dobbins to remain due to new surgical anastomosis  Infectious disease: afebrile  Prophylaxis: DVT heparin subcutaneous. CMV: Valcyte x 12 weeks (CMV R+/D+) and PJP: bactrim indefinitely    Disposition: Transfer to      Medical Decision Making: Medium  Subsequent visit 72133 (moderate level decision making)    EARL/Fellow/Resident Provider: Anna Serna PA-C    Faculty: Shira Campbell MD  _________________________________________________________________  Transplant History: Admitted 12/19/2019 for kidney transplant.  12/19/2019 (Kidney), 6/14/2018 (Heart), Postoperative day: 554 (Heart), 1 (Kidney)     Interval History: History is obtained from the patient  Overnight events: Tolerating diet. Pain control fair. No flatus or BM.    ROS:   A 10-point review of systems was negative except as noted above.    Meds:    acetaminophen  975 mg Oral Q8H     atorvastatin  10 mg Oral Daily     insulin aspart  1-7 Units Subcutaneous TID AC     insulin aspart  1-5 Units Subcutaneous At Bedtime     [START ON 12/21/2019] magnesium oxide  400 mg Oral Daily with lunch     mycophenolate  750 mg Oral BID IS     senna-docusate  1 tablet Oral BID    Or     senna-docusate  2 tablet Oral BID     sodium chloride (PF)  10 mL Intracatheter Q8H     sulfamethoxazole-trimethoprim  1 tablet Oral Daily     tacrolimus  2 mg Oral BID IS     [START ON 12/23/2019] valGANciclovir  450 mg Oral Once per day on Mon Thu       Physical Exam:     Admit Weight: 73.8 kg (162 lb 11.2 oz)    Current vitals:   BP (!) 155/93 (BP Location: Right arm)   Pulse 89   Temp 97.5  F (36.4  C) (Oral)   Resp 19   Ht 1.746 m (5' 8.75\")   Wt 73.8 kg (162 lb 11.2 oz)   SpO2 100%   BMI 24.20 kg/m          Vital sign ranges:    Temp:  [97.4  F (36.3  C)-98.7  F (37.1  C)] 97.5  F (36.4  C)  Pulse:  [80-89] 89  Heart Rate:  [79-93] 88  Resp:  [11-20] 19  BP: (136-166)/(74-95) 155/93  Arterial Line BP: (132-148)/(61-74) 136/74  SpO2:  [97 %-100 %] 100 %  Patient " "Vitals for the past 24 hrs:   BP Temp Temp src Pulse Heart Rate Resp SpO2 Height Weight   12/20/19 0500 (!) 155/93 -- -- -- 88 19 100 % -- --   12/20/19 0400 (!) 155/89 97.5  F (36.4  C) Oral 89 90 20 100 % -- --   12/20/19 0300 (!) 166/90 -- -- -- 93 11 100 % -- --   12/20/19 0200 (!) 160/94 -- -- -- 92 -- 100 % -- --   12/20/19 0100 (!) 157/92 97.4  F (36.3  C) Oral -- 93 18 100 % -- --   12/20/19 0020 (!) 146/82 -- -- -- 91 12 97 % -- --   12/19/19 2355 (!) 144/81 98.1  F (36.7  C) -- -- 90 12 99 % -- --   12/19/19 2340 (!) 142/79 -- -- -- 88 14 99 % -- --   12/19/19 2325 (!) 143/80 -- -- -- 90 14 99 % -- --   12/19/19 2310 (!) 142/84 -- -- -- 86 14 99 % -- --   12/19/19 2255 (!) 147/85 -- -- -- 90 14 99 % -- --   12/19/19 2240 (!) 144/79 98.2  F (36.8  C) Oral -- 86 14 100 % -- --   12/19/19 2225 (!) 144/79 -- -- -- 86 12 -- -- --   12/19/19 2211 (!) 146/86 98.7  F (37.1  C) Oral -- 84 12 100 % -- --   12/19/19 1520 (!) 157/78 -- -- 87 87 -- 100 % -- --   12/19/19 1515 (!) 154/79 -- -- 86 87 -- 100 % -- --   12/19/19 1510 (!) 156/74 -- -- 86 86 -- 99 % -- --   12/19/19 1508 (!) 160/84 -- -- -- 83 18 100 % -- --   12/19/19 1505 (!) 164/95 -- -- 83 81 18 100 % -- --   12/19/19 1500 (!) 154/86 -- -- 80 79 14 100 % -- --   12/19/19 1325 136/89 98.2  F (36.8  C) Oral 85 -- 14 100 % 1.746 m (5' 8.75\") 73.8 kg (162 lb 11.2 oz)     General Appearance: in no apparent distress.   Skin: normal, dry  Heart: NSR  Lungs: NLB on RA  Abdomen: soft, non distended, mildly ttp. he is not tender over the kidney graft. The wound is dry.  : doyle is present. Urine has small hematuria.   Extremities: edema: absent.   Neurologic: awake, alert and oriented. Tremor absent.   CVC    Data:   CMP  Recent Labs   Lab 12/19/19  2215 12/19/19  2045    134   POTASSIUM 4.5 4.6   CHLORIDE 105  --    CO2 20  --    * 183*   BUN 55*  --    CR 7.77*  --    GFRESTIMATED 7*  --    GFRESTBLACK 8*  --    TRINI 8.3*  --    ICAW  --  4.5   MAG " 1.4*  --    PHOS 4.6*  --      CBC  Recent Labs   Lab 12/19/19  2215 12/19/19  2045   HGB 9.7* 8.2*   WBC 4.7  --      --      COAGSNo lab results found in last 7 days.    Invalid input(s): XA   Urinalysis  Recent Labs   Lab Test 10/03/18  1407 09/17/18  0320  05/17/17  1225  04/19/17  1442   COLOR Peri Yellow   < >  --    < >  --    APPEARANCE Cloudy Clear   < >  --    < >  --    URINEGLC Negative 150*   < >  --    < >  --    URINEBILI Small* Negative   < >  --    < >  --    URINEKETONE Negative Negative   < >  --    < >  --    SG 1.020 1.010   < >  --    < >  --    UBLD Negative Negative   < >  --    < >  --    URINEPH 5.0 7.5*   < >  --    < >  --    PROTEIN 100* 100*   < >  --    < >  --    NITRITE Negative Negative   < >  --    < >  --    LEUKEST Trace* Negative   < >  --    < >  --    RBCU 3* 0   < >  --    < >  --    WBCU 10* 2   < >  --    < >  --    UTPG  --   --   --  0.67*  --  0.93*    < > = values in this interval not displayed.     Virology:  CMV DNA Quantitation Specimen   Date Value Ref Range Status   10/28/2019 EDTA PLASMA  Final     Hepatitis C Antibody   Date Value Ref Range Status   12/10/2019 Nonreactive NR^Nonreactive Final     Comment:     Assay performance characteristics have not been established for newborns,   infants, and children

## 2019-12-20 NOTE — PROGRESS NOTES
Transfer  Transferred to: 7A  Via: wheelchair   Reason for transfer: Pt no longer appropriate for 6B- improved  Family: Aware of transfer  Belongings: Packed and sent with pt  Chart: Delivered with pt to next unit  Medications: Meds sent to new unit with pt  Report given to: KATYA RN   Pt status: Alert and oriented x4. Vitals stable, afebrile, NSR 70's-90's. CVP's DC'ed. On insulin gtt alg 1. LR infusing at 100 ml/hr. Last dose of thymo infusing. Up to chair and walking in hallway. Eating well.

## 2019-12-20 NOTE — PROGRESS NOTES
Care Coordinator Progress Note    Admission Date/Time:  12/19/2019  Attending MD:  Shira Campbell MD    Data  Chart reviewed, discussed with interdisciplinary team.   Patient was admitted for: ESRD (end stage renal disease) (H).    Concerns with insurance coverage for discharge needs:  None identified  Current Living Situation: Patient lives alone  Support System: Friend, Naresh and Sons Involved and supportive  Services Involved: [facility]  Transportation at Discharge: Family/Friend  Transportation to Medical Appointments: Family/Friend  Barriers to Discharge: Medical clearance    Coordination of Care and Referrals: Provided patient/family with options for Home Care from medicare.gov with ratings     Assessment  Pt with history of Heart Transplant, s/p Kidney Transplant 12/19/19. I have met with Pt to assist with discharge planning. Prior to admission Pt was independent living alone in [facility]   I have infomed Pt of daily ATC Clinic visits starting the morning after discharge. Home Care follow up discussed which Pt is agreeable to. Choice of Home care agencies offered, [facility] is preferred. I have made a referral to [facility] and added Nursing visits to the discharge orders to start following completion of daily ATC Clinic visits.     Plan  Anticipated Discharge Date:  2-3 days  Anticipated Discharge Plan:  Intermittent Nursing visits provided by [facility] following completion of daily ATC Clinic visits    Earnestine A. Henrique, RN   6B care coordinator #[phone]

## 2019-12-20 NOTE — CONSULTS
[facility], [place]  Transplant Nephrology Consult  Date of Admission:  12/19/2019  Today's Date: 12/20/2019  Requesting physician: Shira Campbell MD    Assessment & Plan   # LDKT: Trend down with a good UOP   - Baseline Cr ~ TBD   - Proteinuria: Not checked post transplant   - Date DSA Last Checked: Dec/2019      Latest DSA: No DSA at time of transplant   - BK Viremia: Not checked recently due to time from transplant   - Kidney Tx Biopsy: No    His allograft is functioning well (downtrending creatinine and large urine output.)     # Heart Transplant:  Functioning well, heart biopsy 10/2019 did not show rejection. He had a negative pre-LDKT angiogram. He has no DSA.     # Immunosuppression: Tacrolimus immediate release (goal 8-10) and Mycophenolate mofetil (goal 1.0-3.5)   - Changes: No, last tac level 7.0 (he was on tac prior to his transplant). Will aim for 8-10, next level 12/22              - Completing thymoglobulin induction 150/150/    # Infection Prophylaxis:   - PJP: Sulfa/TMP (Bactrim)  - CMV: Valcyte +/+  - Thrush: None    EBV +/+    # Hypertension: Borderline control;  Goal BP: < 140/90   - Volume status: Euvolemic   - Changes: No, on hydralazine + ACEi + amlodipine as OP. Will restart hydralazine first to control his BP.    # Diabetes: Glucose generally running ~ 110-130, currently on insulin gtt. Endocrinology will be following him.   - Management as per primary team.    # Anemia in Chronic Renal Disease: Hgb: Stable      ANASTASIYA: Yes   - Iron studies: Replete    # Mineral Bone Disorder:   - Secondary renal hyperparathyroidism; PTH level: Significantly elevated (601-1200 pg/ml)  - Vitamin D; level: Normal        On Supplement: No  - Calcium; level: Normal        On Supplement: No  - Phosphorus; level: High normal        On Supplement: No    # Electrolytes:   - Potassium; level: Normal        On Supplement: No  - Magnesium; level: Normal        On Supplement: No  -  Bicarbonate; level: Normal        On Supplement: No    # MGUS: Normal K/L ratio on last check (9/2019). We should follow this as an outpatient.    # Transplant History:  Etiology of Kidney Failure: Diabetes mellitus type 2  Tx: LDKT  Transplant: 12/19/2019 (Kidney), 6/14/2018 (Heart)  Donor Type: Living Donor Class:   Crossmatch at time of Tx: negative  DSA at time of Tx: No  Significant changes in immunosuppression: None  Significant transplant-related complications: None    Recommendations were communicated to the primary team verbally.    Seen and discussed with Dr. Aravind Kelly MD  Pager: [phone]    REASON FOR CONSULT   LDKT    History of Present Illness   Murray Nicholson is a 64 year old male with a PMHx significant for bicuspid aortic valve and moderate AI w/ iCM and s/p JAE 6/2018, h/o DMT2, h/o ESRD on HD since 2017, h/o perforated diverticulitis s/p sigmoidectomy and small bowel resection (8/2018), h/o HD catheter associated BSI, h/o HSV, h/o MGUS, who presents for a LDKT. He last dialyzed on the day prior to his transplant.    He has long-standing DMT2 since 2000, and developped progressively worse kidney function. He was started on HD via LUE AVG (bovine) in 2017 in [place]. He underwent  Jae in 2018 (the donor kidney was not optimal at that time) and his heart has been functioning well since then while on tac + MMF. His last heart biopsy did not show rejection. He has no hypotension or complications while on HD.    Review of Systems    CONSTITUTIONAL: NEGATIVE for fever, chills, change in weight  INTEGUMENTARY/SKIN: NEGATIVE for worrisome rashes, moles or lesions  EYES: NEGATIVE for vision changes or irritation  ENT/MOUTH: NEGATIVE for ear, mouth and throat problems  RESP: NEGATIVE for significant cough or SOB  BREAST: NEGATIVE for masses, tenderness or discharge  CV: NEGATIVE for chest pain, palpitations or peripheral edema  GI: NEGATIVE for nausea, abdominal pain, heartburn, or change in  bowel habits  : NEGATIVE for frequency, dysuria, or hematuria  MUSCULOSKELETAL: NEGATIVE for significant arthralgias or myalgia  NEURO: NEGATIVE for weakness, dizziness or paresthesias  ENDOCRINE: NEGATIVE for temperature intolerance, skin/hair changes  HEME: NEGATIVE for bleeding problems  PSYCHIATRIC: NEGATIVE for changes in mood or affect    Past Medical History    I have reviewed this patient's medical history and updated it with pertinent information if needed.   Past Medical History:   Diagnosis Date     (HFpEF) heart failure with preserved ejection fraction (H)      Allergic rhinitis, cause unspecified      Anemia of chronic kidney failure      AS (aortic stenosis)      Ascending aortic aneurysm (H)      Bicuspid aortic valve      CAD (coronary artery disease)      Congestive heart failure, unspecified      Dialysis patient (H)     Tues-Thur-Sat     Dyslipidemia      Esophageal reflux      ESRD (end stage renal disease) (H)      Hearing problem      Heart replaced by transplant (H) 12/10/2018     Hypersomnia with sleep apnea, unspecified      Hypertension      Ileostomy status (H)      Immunosuppression (H)      MGUS (monoclonal gammopathy of unknown significance)      Mitral regurgitation      SHEELA (obstructive sleep apnea)     No CPAP     Pneumonia      Systolic heart failure (H)      Type 2 diabetes mellitus (H)        Past Surgical History   I have reviewed this patient's surgical history and updated it with pertinent information if needed.  Past Surgical History:   Procedure Laterality Date     CARDIAC SURGERY       COLONOSCOPY N/A 5/3/2018    Procedure: COLONOSCOPY;  colonoscopy ;  Surgeon: Ammon Castillo MD;  Location: [facility]     CREATE FISTULA ARTERIOVENOUS UPPER EXTREMITY BOVINE Left 5/8/2019    Procedure: Left Upper Extremity Arteriovenous Bovine Graft Creation;  Surgeon: Calin Cheney MD;  Location: [facility]     CV CORONARY ANGIOGRAM N/A 6/28/2019    Procedure: CV CORONARY ANGIOGRAM;   Surgeon: Montrell Posada MD;  [facility]     CV HEART BIOPSY N/A 2/1/2019    Procedure: HBX;  Surgeon: Montrell Posada MD;  [facility]     CV HEART BIOPSY N/A 3/22/2019    Procedure: HBX, RIJV ACCESS;  Surgeon: Jordan Fox MD;  [facility]     CV HEART BIOPSY N/A 6/28/2019    Procedure: CV HEART BIOPSY;  Surgeon: Montrell Posada MD;  [facility]     CV HEART BIOPSY N/A 10/28/2019    Procedure: CV HEART BIOPSY;  Surgeon: Marcelo Ramírez MD;  [facility]     CV RIGHT HEART CATH N/A 6/28/2019    Procedure: CV RIGHT HEART CATH;  Surgeon: Montrell Posada MD;  [facility]     ESOPHAGOSCOPY, GASTROSCOPY, DUODENOSCOPY (EGD), COMBINED N/A 5/7/2018    Procedure: COMBINED ENDOSCOPIC ULTRASOUND, ESOPHAGOSCOPY, GASTROSCOPY, DUODENOSCOPY (EGD), FINE NEEDLE ASPIRATE/BIOPSY;  Endoscopic Ultrasound with Fine Needle Aspiration ;  Surgeon: Alon Don MD;  [facility]     EXAM UNDER ANESTHESIA RECTUM N/A 8/12/2018    Procedure: EXAM UNDER ANESTHESIA RECTUM;  EXAM UNDER ANESTHESIA RECTUM ,COMBINED INCISION AND DRAINAGE OF RECTAL ABCESS ;  Surgeon: Rick Tran MD;  [facility]     INCISION AND DRAINAGE RECTUM, COMBINED N/A 8/12/2018    Procedure: COMBINED INCISION AND DRAINAGE RECTUM;;  Surgeon: Rick Tran MD;  [facility]     IR DIALYSIS FISTULOGRAM LEFT  9/25/2019     IR DIALYSIS PTA  9/25/2019     LAPAROSCOPIC ASSISTED COLOSTOMY TAKEDOWN N/A 12/11/2018    Procedure: Laparoscopic Assisted Colostomy Takedown, Laparoscopic Lysis of Adhesions;  Surgeon: Rick Tran MD;  [facility]     LAPAROSCOPIC ASSISTED SIGMOID COLECTOMY N/A 8/14/2018    Procedure: LAPAROSCOPIC ASSISTED SIGMOID COLECTOMY;  Laparoscopic Hand Assisted Takedown of Splenic Flexure, Sigmoidectomy, Small Bowel Resection, Takedown of  Small Bowel to Colon Fistula;  Surgeon: Rick Tran MD;  [facility]     LAPAROSCOPIC HERNIORRHAPHY INGUINAL BILATERAL Bilateral 2015    Procedure: LAPAROSCOPIC HERNIORRHAPHY INGUINAL BILATERAL;  Surgeon: Bobby Mcconnell MD;  [facility]     LAPAROSCOPIC INSERTION CATHETER PERITONEAL DIALYSIS N/A 2017    Procedure: LAPAROSCOPIC INSERTION CATHETER PERITONEAL DIALYSIS;  Laparoscopic Peritoneal Dialysis Catheter Placement - Anesthesia with block;  Surgeon: Esteban Arvizu MD;  [facility]     PICC INSERTION Left 2018    5Fr - 49cm (3cm external), Basilic vein, low SVC     REMOVE CATHETER PERITONEAL Right 1/15/2018    Procedure: REMOVE CATHETER PERITONEAL;  Open Removal of Peritoneal Dialysis Catheter ;  Surgeon: Esteban Arvizu MD;  [facility]     SIGMOIDOSCOPY FLEXIBLE N/A 2018    Procedure: Examination Under Anesthesia, Flexible Sigmoidoscopy and Polypectomy;  Surgeon: Rick Trna MD;  [facility]     SIGMOIDOSCOPY FLEXIBLE N/A 2018    Procedure: Flexible Sigmoidoscopy;  Surgeon: Rick Tran MD;  [facility]     TAKEDOWN ILEOSTOMY N/A 3/27/2019    Procedure: Takedown Ileostomy;  Surgeon: Rick Tran MD;  [facility]     TRANSPLANT HEART RECIPIENT N/A 2018    Procedure: TRANSPLANT HEART RECIPIENT;  Median Sternotomy, on-pump oxygenator, Heart Transplant;  Surgeon: Rony Caputo MD;  [facility]       Family History   I have reviewed this patient's family history and updated it with pertinent information if needed.   Family History   Problem Relation Age of Onset     C.A.D. Father          from-never knew father-age 60     Diabetes Father      Cerebrovascular Disease Father      Hypertension Father      Hypertension No family hx of      Breast Cancer No family hx of      Cancer - colorectal No family hx of      Prostate Cancer No family hx of      Kidney Disease No family hx of   "    Melanoma No family hx of      Skin Cancer No family hx of      Social History   I have reviewed this patient's social history and updated it with pertinent information if needed. Murray Nicholson  reports that he quit smoking about 25 years ago. His smoking use included cigarettes. He started smoking about 35 years ago. He has a 20.00 pack-year smoking history. He has never used smokeless tobacco. He reports that he does not drink alcohol or use drugs.    Allergies   Allergies   Allergen Reactions     Norco [Hydrocodone-Acetaminophen] Nausea and Vomiting     Cats      Throat tightness     Isosorbide Other (See Comments)     hypotension     Penicillins Hives     Seasonal Allergies      rhinitis     Shrimp      Throat closes      Prior to Admission Medications     acetaminophen  975 mg Oral Q8H     heparin ANTICOAGULANT  5,000 Units Subcutaneous TID     [START ON 2019] magnesium oxide  400 mg Oral Daily with lunch     mycophenolate  750 mg Oral BID IS     polyethylene glycol  17 g Oral Daily     senna-docusate  1 tablet Oral BID    Or     senna-docusate  2 tablet Oral BID     sodium chloride (PF)  10 mL Intracatheter Q8H     sulfamethoxazole-trimethoprim  1 tablet Oral Once per day on      tacrolimus  3 mg Oral BID IS     [START ON 2019] valGANciclovir  450 mg Oral Once per day on Mon Thu       IV fluid REPLACEMENT ONLY       insulin (regular) 0.2 Units/hr (19 1300)     lactated ringers 100 mL/hr at 19 1109       Physical Exam   Temp  Av.2  F (36.8  C)  Min: 97.4  F (36.3  C)  Max: 100  F (37.8  C)  Arterial Line BP  Min: 132/62  Max: 148/68      Pulse  Av.9  Min: 76  Max: 90 Resp  Avg: 15.3  Min: 9  Max: 26  SpO2  Av.6 %  Min: 97 %  Max: 100 %    CVP (mmHg): 5 mmHgBP (!) 121/90   Pulse 86   Temp 97.6  F (36.4  C) (Oral)   Resp 16   Ht 1.746 m (5' 8.75\")   Wt 70.8 kg (156 lb 1.4 oz)   SpO2 99%   BMI 23.22 kg/m     Date 19 0700 - 19 0659   Shift " [id number] 5357-9840 9788-0659 24 Hour Total   INTAKE   P.O. 740   740   I.V. 4630   4630   Shift Total(mL/kg) 5370(75.85)   5370(75.85)   OUTPUT   Urine 4450   4450   Drains 50   50   Shift Total(mL/kg) 4500(63.56)   4500(63.56)   Weight (kg) 70.8 70.8 70.8 70.8      Admit Weight: 73.8 kg (162 lb 11.2 oz)     GENERAL APPEARANCE: alert and no distress  HENT: mouth without ulcers or lesions  LYMPHATICS: no cervical or supraclavicular nodes  RESP: lungs clear to auscultation - no rales, rhonchi or wheezes  CV: regular rhythm, normal rate, no rub, no murmur  EDEMA: no LE edema bilaterally  ABDOMEN: soft, nondistended, nontender, bowel sounds normal  MS: extremities normal - no gross deformities noted, no evidence of inflammation in joints, no muscle tenderness  SKIN: no rash  ACCESS: AVG with good thrill  ALLOGRAFT: PAULINA in place and draining >300cc daily. Incision healing well with minimal incisional leakage onto pads.    Data   CMP  Recent Labs   Lab 12/20/19 0814 12/20/19 0514 12/19/19 2215 12/19/19 2045   NA  --  130* 134 134   POTASSIUM 4.1 4.0 4.5 4.6   CHLORIDE  --  98 105  --    CO2  --  21 20  --    ANIONGAP  --  11 10  --    GLC  --  307* 210* 183*   BUN  --  42* 55*  --    CR  --  4.89* 7.77*  --    GFRESTIMATED  --  12* 7*  --    GFRESTBLACK  --  13* 8*  --    TRINI  --  8.7 8.3*  --    MAG  --  1.8 1.4*  --    PHOS  --  4.7* 4.6*  --      CBC  Recent Labs   Lab 12/20/19 0814 12/20/19 0514 12/19/19 2215 12/19/19 2045   HGB 10.5* 10.9* 9.7* 8.2*   WBC  --  5.4 4.7  --    RBC  --  3.29* 2.93*  --    HCT  --  33.7* 30.8*  --    MCV  --  102* 105*  --    MCH  --  33.1* 33.1*  --    MCHC  --  32.3 31.5  --    RDW  --  14.6 15.0  --    PLT  --  173 151  --      INRNo lab results found in last 7 days.  ABG  Recent Labs   Lab 12/19/19  2045   PH 7.30*   PCO2 37   PO2 157*   HCO3 19*   O2PER 41      Urine Studies  Recent Labs   Lab Test 10/03/18  1407 09/17/18  0320 07/28/18  0528 07/18/18  0855   COLOR Peri  Yellow Yellow Yellow   APPEARANCE Cloudy Clear Slightly Cloudy Cloudy   URINEGLC Negative 150* 300* 50*   URINEBILI Small* Negative Negative Negative   URINEKETONE Negative Negative Negative 5*   SG 1.020 1.010 1.017 1.016   UBLD Negative Negative Trace* Small*   URINEPH 5.0 7.5* 5.5 6.0   PROTEIN 100* 100* 30* 100*   NITRITE Negative Negative Negative Negative   LEUKEST Trace* Negative Negative Trace*   RBCU 3* 0 3* 4*   WBCU 10* 2 5 12*     Recent Labs   Lab Test 05/17/17  1225 04/19/17  1442 05/19/15  1006 02/12/14  1205 08/14/13  1341 07/08/13  1347 07/03/13  1410   UTPG 0.67* 0.93* 1.77* 2.25* 1.25* 1.04* 1.14*     PTH  Recent Labs   Lab Test 12/10/19  1121 04/09/17  1019 02/10/16  1357 07/03/13  1359   PTHI 821* 110* 247* 91*     Iron Studies  Recent Labs   Lab Test 12/10/19  1121 06/10/19  1315 07/10/18  0711 05/08/18  0954 07/19/17  1306 07/05/17  1204 06/21/17  1058 05/17/17  1214 04/19/17  1447 04/11/17  0722 03/15/17  1355 02/15/17  1023 01/04/17  1005 12/06/16  1126 11/18/16  0920 10/21/16  0952 09/14/16  1319 08/11/16  0904 06/02/16  0950 05/12/16  1154 04/05/16  1224 02/10/16  1357 12/02/15  1412 11/17/15  1012 09/01/15  1059 07/16/15  0829 06/16/15  1656 05/19/15  1000 05/18/15  1140 04/09/15  0900 01/07/14  1032 09/18/13  1024 08/14/13  1340 07/08/13  1336 07/03/13  1359 02/28/13  0952   IRON 84 118 66 58 46 26* 69 42 63 17* 30* 28* 43 34* 34* 77 24* 77 74 53 62 61 109 66 40 57 55 30* 35  --   --   --  23* <10* 32* 22*    251 238* 218* 263 228* 237* 210* 213* 176* 232* 215* 243 254 236* 234* 215* 264 233* 242 278 284 280 265 333 331 372 392 380  --   --   --  423 423 443* 425   IRONSAT 35 47* 28 27 18 12* 29 20 30 10* 13* 13* 18 13* 14* 33 11* 29 32 22 22 21 39 25 12* 17 15 8* 9*  --   --   --  5* <2* 7* 5*   ALBERTINA 445* 644* 771* 621* 369 542* 557* 806* 1,509* 1,194* 860* 432* 663* 460* 505* 420* 359 297 385 536* 620* 261 424* 504* 30 25* 22* 19*  --  19* 38 30 9* 7* 8* 13*       IMAGING:  All  imaging studies reviewed by me.    I have seen and examined this patient with the fellow.  This note reflects our joint assessment and plan.     Shannan Nazario MD

## 2019-12-20 NOTE — CONSULTS
Inpatient Diabetes Management Note  Date of Service: 12/20/19           Assessment and Plan:   Assessment: Murray Nicholson is a 64 year old male with history of end stage kidney disease, diabetes mellitus type 2, hypertension, heart transplant secondary to ischemic cardiomyopathy, and hyperlipidemia, who was admitted on 12/19 for living donor kidney transplant. Endocrinology was consulted for assistance in controlling his blood sugars given his DM2 history and current steroid requirements.     Patient has a long standing history of type 2 diabetes, recently well controlled with last A1c of 5.2% on 12/10/19. Historically has been on metformin, glipizide, humulin, and novolog but has been on no medications at home since June 2019. While in the hospital, he was having elevated BG levels early on 12/20 and was placed on insulin gtt. He is on methylprednisone (last dose was today), and increases in insulin requirements correlate with the steroids. We will re-evaluate tomorrow and see if he can be transitioned off the drip. Discussed with patient how he may still require some insulin on discharge with his new kidney. He will require close follow up with his primary care on discharge to help manage any changes in his diabetes.    Plan:  - Continue insulin gtt  - Add novolog 1U per 15 g CHO coverage (ordered for you)    Clarisa Can, Medical Student  Patient seen and discussed with Dr. Umana           Diabetes History:   He has a long history of diabetes, recently controlled with A1c prior to admission of 5.2%. In the past has been on metformin and glipizide. In 2018 was switched to subcutaneous insulin once he started coming to the hospital more. Had been on Humulin (most recently 16 U daily) until March 2019 and Novolog sliding scale correction until June 2019. These were discontinued since he achieved good diabetes control with consistent A1c levels <5.7%. He notes he lost about 75 lbs in the past few years  after cooking more at home and watching his portion size.    Diabetes Mellitus Type: 2  Duration: about 20 years  Last HbA1c: 5.2%. Date: 12/10/19, now 4.3% today 12/20  Diabetic Complications: Nephropathy, no neuropathy or retinopathy  Preadmission Diabetes Treatment: No treatment immediatly prior to admission.  Hypoglycemia    Frequency: A few times with BG down to 80's    History of serious hypoglycemia: None  Comorbidities: HTN - on hydralazine, lisinopril, amlodipine. HLD - on atorvastatin.         Interval History:   Elevated BG overnight with peak of 307 at 5AM this morning, was started on insulin drip later this morning. Has had requirements of 0.2-8 U/hr today. He receieved 500 mg of methylprednisone yesterday and 250 mg today which was his last dose. Insulin requirements have increased this afternoon after this second steroid dose. Overall he feels well today. He is on regular diet, had a small breakfast and will have lunch soon. No nausea or vomiting.    Current Diabetes Medications: Insulin gtt    Other Relevant Medications: Methylprednisone, received last dose today  Current Symptoms: None, overall feels well  Current Diet: Regular diet  Hypoglycemia: No         Significant Problems:     Past Medical History:   Diagnosis Date     (HFpEF) heart failure with preserved ejection fraction (H)      Allergic rhinitis, cause unspecified      Anemia of chronic kidney failure      AS (aortic stenosis)      Ascending aortic aneurysm (H)      Bicuspid aortic valve      CAD (coronary artery disease)      Congestive heart failure, unspecified      Dialysis patient (H)     Tues-ur-Sat     Dyslipidemia      Esophageal reflux      ESRD (end stage renal disease) (H)      Hearing problem      Heart replaced by transplant (H) 12/10/2018     Hypersomnia with sleep apnea, unspecified      Hypertension      Ileostomy status (H)      Immunosuppression (H)      MGUS (monoclonal gammopathy of unknown significance)      Mitral  regurgitation      SHEELA (obstructive sleep apnea)     No CPAP     Pneumonia      Systolic heart failure (H)      Type 2 diabetes mellitus (H)              Review of Systems:     The Review of Systems is negative other than noted in the Interval History.           Medications:     Current Facility-Administered Medications   Medication     [START ON 12/22/2019] acetaminophen (TYLENOL) tablet 650 mg     acetaminophen (TYLENOL) tablet 650 mg     acetaminophen (TYLENOL) tablet 975 mg     anti-thymocyte globulin (THYMOGLOBULIN - Rabbit) 150 mg in sodium chloride 0.9 % intermittent infusion     [START ON 12/21/2019] bisacodyl (DULCOLAX) Suppository 10 mg     dextrose 10 % 1,000 mL infusion     glucose gel 15-30 g    Or     dextrose 50 % injection 25-50 mL    Or     glucagon injection 1 mg     diphenhydrAMINE (BENADRYL) capsule 25-50 mg    Or     diphenhydrAMINE (BENADRYL) solution 25-50 mg     heparin ANTICOAGULANT injection 5,000 Units     HYDROmorphone (DILAUDID) tablet 2-4 mg     insulin 1 unit/mL in saline (NovoLIN, HumuLIN Regular) drip - ADULT IV Infusion     lactated ringers infusion     [START ON 12/21/2019] magnesium oxide (MAG-OX) tablet 400 mg     methylPREDNISolone sodium succinate (solu-MEDROL) 250 mg in sodium chloride 0.9 % 50 mL intermittent infusion     mycophenolate (GENERIC EQUIVALENT) capsule 750 mg     naloxone (NARCAN) injection 0.1-0.4 mg     ondansetron (ZOFRAN-ODT) ODT tab 4 mg    Or     ondansetron (ZOFRAN) injection 4 mg     polyethylene glycol (MIRALAX/GLYCOLAX) Packet 17 g     senna-docusate (SENOKOT-S/PERICOLACE) 8.6-50 MG per tablet 1 tablet    Or     senna-docusate (SENOKOT-S/PERICOLACE) 8.6-50 MG per tablet 2 tablet     sodium chloride (PF) 0.9% PF flush 10 mL     sodium chloride (PF) 0.9% PF flush 10-20 mL     sulfamethoxazole-trimethoprim (BACTRIM/SEPTRA) 400-80 MG per tablet 1 tablet     tacrolimus (GENERIC EQUIVALENT) capsule 3 mg     [START ON 12/23/2019] valGANciclovir (VALCYTE) tablet  "450 mg     Facility-Administered Medications Ordered in Other Encounters   Medication     diatrizoate meglumine-sodium (GASTROGRAFIN/GASTROVIEW) 66-10 % solution 480 mL            Physical Exam:   Pt appears comfortable, in no distress  Alert, responsive  Vitals: /83   Pulse 86   Temp 97.6  F (36.4  C) (Oral)   Resp 16   Ht 1.746 m (5' 8.75\")   Wt 70.8 kg (156 lb 1.4 oz)   SpO2 100%   BMI 23.22 kg/m    BMI= Body mass index is 23.22 kg/m .          Data:     Recent Labs   Lab 12/20/19  1159 12/20/19  1111 12/20/19  0949 12/20/19  0817 12/20/19  0514 12/20/19  0123 12/19/19  2215 12/19/19  2155 12/19/19 2045   GLC  --   --   --   --  307*  --  210*  --  183*   * 278* 298* 287*  --  250*  --  192*  --      Lab Results   Component Value Date    WBC 5.4 12/20/2019    WBC 4.7 12/19/2019    WBC 3.1 (L) 12/10/2019    HGB 10.5 (L) 12/20/2019    HGB 10.9 (L) 12/20/2019    HGB 9.7 (L) 12/19/2019    HCT 33.7 (L) 12/20/2019    HCT 30.8 (L) 12/19/2019    HCT 35.0 (L) 12/10/2019     (H) 12/20/2019     (H) 12/19/2019     (H) 12/10/2019     12/20/2019     12/19/2019     12/10/2019     Lab Results   Component Value Date     (L) 12/20/2019     12/19/2019     12/19/2019    POTASSIUM 4.1 12/20/2019    POTASSIUM 4.0 12/20/2019    POTASSIUM 4.5 12/19/2019    CHLORIDE 98 12/20/2019    CHLORIDE 105 12/19/2019    CHLORIDE 99 12/10/2019    CO2 21 12/20/2019    CO2 20 12/19/2019    CO2 28 12/10/2019     (H) 12/20/2019     (H) 12/19/2019     (H) 12/19/2019     Lab Results   Component Value Date    BUN 42 (H) 12/20/2019    BUN 55 (H) 12/19/2019    BUN 38 (H) 12/10/2019     Lab Results   Component Value Date    TSH 2.25 04/16/2018    TSH 3.59 02/21/2018    TSH 1.26 04/12/2017     Lab Results   Component Value Date    AST 11 12/10/2019    AST 11 10/28/2019    AST 14 06/07/2019    ALT 16 12/10/2019    ALT 14 10/28/2019    ALT 19 06/07/2019    " ALKPHOS 372 (H) 12/10/2019    ALKPHOS 318 (H) 10/28/2019    ALKPHOS 278 (H) 06/07/2019     I was present with medical student who participated in the service and in the documentation of the note. I have verified the history and personally performed the physical exam and medical decision making. I agree with the assessment and plan of care as documented in the note. Medical student acts as a scribe.    Keanu Umana MD  Division of Diabetes and Endocrinology  Department of Medicine  [phone]

## 2019-12-20 NOTE — PROGRESS NOTES
"CLINICAL NUTRITION SERVICES - ASSESSMENT NOTE     Nutrition Prescription    RECOMMENDATIONS FOR MDs/PROVIDERS TO ORDER:  Liberalize diet as medically able to promote calorie/protien intake to promote healing.     Malnutrition Status:    Patient does not meet two criteria at this time     Recommendations already ordered by Registered Dietitian (RD):  Discharge diet instruction written     Pt may order supplements PRN   Future/Additional Recommendations:  If suspect eating poorly, recommend starting oral nutrition supplements. If intakes are variable, recommend start calorie count.     Education review on acutely increased energy and protein needs, 6-8 weeks post surgery and long term recommendation for heart healthy (low saturated fat, low sodium) diet and food safety precautions.          REASON FOR ASSESSMENT  Pt is a 64 year old male seen by the dietitian for MD Order- Assess & Educate post-op SOT    Clinical History  Heart transplant 2018, Type 2 Diabetes (last hgb A1c 5.2 on 12/10/19), HTN, ESRD on HD      NUTRITION HISTORY  Patient reported he has been following food safety guidelines closely since heart transplant. He reports usually eating a good breakfast and lunch with lighter dinner. Confirmed usual weight between 155-160 lbs.     CURRENT NUTRITION ORDERS  Diet: Clear Liquid  Intake/Tolerance: not documented     LABS  Urea Nitrogen 42 (H) down trending   Creatine 4.89 (H) down trending   Glucose 192, 210, 250, 307     MEDICATIONS  Mycophenolate  Methylprednisolone   Senna-docusate  Tacrolimus   D5- ml/hr     ANTHROPOMETRICS  Height: 174 cm (5' 8\")  Most Recent Weight: 73.8 kg (162 lb 11.2 oz)    IBW: 70 kg  BMI: Normal BMI  Weight History:   Wt Readings from Last 15 Encounters:   12/19/19 73.8 kg (162 lb 11.2 oz)   12/10/19 71.4 kg (157 lb 6.5 oz)   12/10/19 71.4 kg (157 lb 6.5 oz)   11/13/19 71 kg (156 lb 8.4 oz)   11/05/19 71.2 kg (157 lb)   10/28/19 71.4 kg (157 lb 6.5 oz)   10/28/19 73.5 kg (162 " lb 0.6 oz)   09/30/19 73.5 kg (162 lb)   09/27/19 73.5 kg (162 lb)   09/24/19 74.4 kg (164 lb)   08/21/19 73.5 kg (162 lb)   08/09/19 73.5 kg (162 lb 1.6 oz)   07/08/19 76.2 kg (168 lb)   06/28/19 73.5 kg (162 lb)   06/17/19 76 kg (167 lb 9.6 oz)     Dosing Weight: 74 kg    ASSESSED NUTRITION NEEDS:  Estimated Energy Needs: 9212-8051 kcals (30-35 Kcal/Kg)  Justification: increased needs post-op SOT  Estimated Protein Needs: 96-148grams protein (1.3-2 gm/Kg)  Justification: increased needs post op SOT  Estimated Fluid Needs: 1089-1126 mL (25-30 mL/Kg)  Justification: maintenance, or per MD pending fluid status and adequate UOP    PHYSICAL FINDINGS  No abnormal nutrition-related physical findings observed.     MALNUTRITION  % Intake: No decreased intake noted  % Weight Loss: None noted  Subcutaneous Fat Loss: None observed  Muscle Loss: Temporal:  moderate, Thoracic region (clavicle, acromium bone, deltoid, trapezius, pectoral):  mild and Lower arm  (forearm):  mild   Fluid Accumulation/Edema: None noted  Malnutrition Diagnosis: Patient does not meet two of the established criteria necessary for diagnosing malnutrition    NUTRITION DIAGNOSIS:  Food and nutrition-related knowledge deficit r/t length of time since previous post-transplant education AEB patient verbal report, review of chart record, and MD consult for nutrition education.     INTERVENTIONS  Implementation  Nutrition Education:  -- Provided instruction on post-transplant diet with discussion regarding protein sources and high protein needs in acute post-tx phase.   --Discussed monitoring of K+/Phos lab values with possible need for adjustment of these in the diet as necessary.   -- Reviewed recommendations to follow low fat/low sodium diet long term and discussed heart healthy diet tips.    --Reviewed need for food safety precautions to prevent food borne illness.  Provided & reviewed handout: Post-transplant diet guidelines. Patient receptive to  information provided. Expected diet compliance is good.     Goals  1. Patient will verbalize understanding of 3 important aspects of post-transplant diet guidelines.   2. PO intake >50% meals TID.    Monitoring/Evaluation  Progress toward goals will be monitored and evaluated per protocol.    Yaz Perez RD, LD  6B pager: [phone]

## 2019-12-20 NOTE — ANESTHESIA CARE TRANSFER NOTE
Patient: Murray Nicholson    Procedure(s):  Kidney Transplant Non-Directed Recipient    Diagnosis: ESRD (end stage renal disease) (H) [N18.6]  Diagnosis Additional Information: No value filed.    Anesthesia Type:   General     Note:  Airway :Face Mask  Patient transferred to:PACU  Comments: VSS. Breathing spontaneously at a regular rate with adequate tidal volumes and maintaining O2 sats on 4L. Denies nausea or pain. No apparent complications from anesthesia.     Nalini Garduno MD on 12/19/2019 at 9:59 PM    Handoff Report: Identifed the Patient, Identified the Reponsible Provider, Reviewed the pertinent medical history, Discussed the surgical course, Reviewed Intra-OP anesthesia mangement and issues during anesthesia, Set expectations for post-procedure period and Allowed opportunity for questions and acknowledgement of understanding      Vitals: (Last set prior to Anesthesia Care Transfer)    CRNA VITALS  12/19/2019 2109 - 12/19/2019 2159      12/19/2019             EKG:  Sinus rhythm                Electronically Signed By: Nalini Garduno MD  December 19, 2019  9:59 PM

## 2019-12-20 NOTE — CONSULTS
[facility]   Cardiology II Service / Advanced Heart Failure  Daily Consult Note      Patient: Murray Nicholson  MRN: [id number]  Admission Date: 12/19/2019  Hospital Day # 1    Assessment and Plan: Murray Nicholson is a 64 year old male with history of non ischemic cardiomyopathy s/p orthotopic heart transplant 6/14/18 (initially listed for heart/kidney transplant), also history of donor CAD, drug induced leukopenia, hx of perforated bowel s/p small bowel resection and ileostomy then takedown 3/2019, and klebsiella mouth ulcer post transplant who was admitted 12/19 for LDKT.     Recommendations:  -IS per renal transplant  -agree with monthly CMV PCR given high risk CMV status (D+/R- heart)  -will follow along    # ESRD s/p LDKT 12/19/19  Post-op course unremarkable. Has made 12.3 L urine today.   - thymo and IS per renal transplant    # Status post OHT on 6/14/18, history of NICM  # Donor 3-vessel CAD (pLAD 40%, OM1 50%, OM2 80%, distal RCA 40%)  # Chronic immunosuppression  * Rejection history: none  * Graft function: normal by last echocardiogram  * Last biopsy: 10/28/19 negative  * Intolerance to medications: developed leukopenia when on valcyte, bactrim, higher MMF dose     Immunosuppression:  - Tacrolimus trough level goal 8-10 per renal (pta goal 6-8)  - Mycophenolate 750 mg bid per renal (pta 500 mg bid)  - steroid taper per renal      Prophylaxis:  - PPI: pta pantoprazole 40 mg  - CAV/CAD: ASA 81 mg and atorvastatin 40 mg (Crestor cost prohibitive), per last angiogram may benefit from lifelong dapt, defer to Dr Ernst  - CMV high risk heart D+ R-: kidney donor status unknown. He will be on Valcyte x 6 months per renal transplant protocol, agree with monthly CMV PCR.     # HTN. 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   # Hx perforated bowel s/p colostomy, resolved  # Hx klebsiella mouth ulcer, resolved  # Hx pseudomonas bacteremia, resolved   #  "Pancreatic cyst surveillance. Last MRI 4/2019 showed \"numerous pancreatic cystic foci are similar to those seen on 4/19/2018 with minimal increase in size of one of these cystic foci arising from the pancreatic body (now measuring 14 mm, previously 13 mm). These remain most consistent with side branch intraductal papillary mucinous neoplasms. No suspicious features on this unenhanced exam.\" Abdominal MRI q1-2 years per GI.       Ramya Suh DNP, NP-C  Advanced Heart Failure/Cardiology II Service  Pager [phone]    ================================================================    Subjective/24-Hr Events:   Last 24 hr care team notes reviewed. Uncomplicated surgery. Patient feeling well. Pain is tolerable. No concerns. Denies LE edema, orthopnea, PND.    ROS:  4 point ROS including respiratory, CV, GI and  (other than that noted in the HPI) is negative.     Medications: Reviewed in EPIC.     Physical Exam:   BP (!) 141/94 (BP Location: Right arm)   Pulse 86   Temp 97.5  F (36.4  C) (Oral)   Resp 12   Ht 1.746 m (5' 8.75\")   Wt 70.8 kg (156 lb 1.4 oz)   SpO2 100%   BMI 23.22 kg/m      GENERAL: Appears comfortable, in no distress.  HEENT: Eye symmetrical, no discharge or icterus bilaterally. Mucous membranes moist and without lesions.  NECK: Supple, JVD not visible at 90 degrees.   CV: RRR, +S1S2, no murmur, rub, or gallop.   RESPIRATORY: Respirations regular, even, and unlabored. Lungs CTA throughout.   GI: Soft and non distended with normoactive bowel sounds present in all quadrants. No tenderness, rebound, guarding.   EXTREMITIES: No peripheral edema. 2+ bilateral pedal pulses. Warm.   NEUROLOGIC: Alert and oriented x 3. No focal deficits.   MUSCULOSKELETAL: No joint swelling or tenderness.   SKIN: No jaundice. No rashes or lesions.     Labs:  CMP  Recent Labs   Lab 12/20/19  0814 12/20/19  0514 12/19/19  2215 12/19/19  2045   NA  --  130* 134 134   POTASSIUM 4.1 4.0 4.5 4.6   CHLORIDE  --  98 105  --  "   CO2  --  21 20  --    ANIONGAP  --  11 10  --    GLC  --  307* 210* 183*   BUN  --  42* 55*  --    CR  --  4.89* 7.77*  --    GFRESTIMATED  --  12* 7*  --    GFRESTBLACK  --  13* 8*  --    TRINI  --  8.7 8.3*  --    MAG  --  1.8 1.4*  --    PHOS  --  4.7* 4.6*  --        CBC  Recent Labs   Lab 12/20/19  0814 12/20/19  0514 12/19/19  2215 12/19/19  2045   WBC  --  5.4 4.7  --    RBC  --  3.29* 2.93*  --    HGB 10.5* 10.9* 9.7* 8.2*   HCT  --  33.7* 30.8*  --    MCV  --  102* 105*  --    MCH  --  33.1* 33.1*  --    MCHC  --  32.3 31.5  --    RDW  --  14.6 15.0  --    PLT  --  173 151  --        Time/Communication  I personally spent a total of 25 minutes. Of that 15 minutes was counseling/coordination of patient's care. Plan of care discussed with patient. See my note above for details.    Patient discussed with Dr. Austin.

## 2019-12-20 NOTE — PROVIDER NOTIFICATION
MD kate notified of patient CVP running 3-4. Also BG in labs at 0600 is 307. MD to pass off to primary team.

## 2019-12-21 LAB
ANION GAP SERPL CALCULATED.3IONS-SCNC: 4 MMOL/L (ref 3–14)
BASOPHILS # BLD AUTO: 0 10E9/L (ref 0–0.2)
BASOPHILS NFR BLD AUTO: 0 %
BUN SERPL-MCNC: 33 MG/DL (ref 7–30)
CALCIUM SERPL-MCNC: 8.8 MG/DL (ref 8.5–10.1)
CHLORIDE SERPL-SCNC: 109 MMOL/L (ref 94–109)
CO2 SERPL-SCNC: 24 MMOL/L (ref 20–32)
CREAT SERPL-MCNC: 1.65 MG/DL (ref 0.66–1.25)
DIFFERENTIAL METHOD BLD: ABNORMAL
EOSINOPHIL # BLD AUTO: 0 10E9/L (ref 0–0.7)
EOSINOPHIL NFR BLD AUTO: 0 %
ERYTHROCYTE [DISTWIDTH] IN BLOOD BY AUTOMATED COUNT: 14.8 % (ref 10–15)
GFR SERPL CREATININE-BSD FRML MDRD: 43 ML/MIN/{1.73_M2}
GLUCOSE BLDC GLUCOMTR-MCNC: 101 MG/DL (ref 70–99)
GLUCOSE BLDC GLUCOMTR-MCNC: 101 MG/DL (ref 70–99)
GLUCOSE BLDC GLUCOMTR-MCNC: 104 MG/DL (ref 70–99)
GLUCOSE BLDC GLUCOMTR-MCNC: 107 MG/DL (ref 70–99)
GLUCOSE BLDC GLUCOMTR-MCNC: 110 MG/DL (ref 70–99)
GLUCOSE BLDC GLUCOMTR-MCNC: 113 MG/DL (ref 70–99)
GLUCOSE BLDC GLUCOMTR-MCNC: 117 MG/DL (ref 70–99)
GLUCOSE BLDC GLUCOMTR-MCNC: 120 MG/DL (ref 70–99)
GLUCOSE BLDC GLUCOMTR-MCNC: 122 MG/DL (ref 70–99)
GLUCOSE BLDC GLUCOMTR-MCNC: 122 MG/DL (ref 70–99)
GLUCOSE BLDC GLUCOMTR-MCNC: 128 MG/DL (ref 70–99)
GLUCOSE BLDC GLUCOMTR-MCNC: 129 MG/DL (ref 70–99)
GLUCOSE BLDC GLUCOMTR-MCNC: 157 MG/DL (ref 70–99)
GLUCOSE BLDC GLUCOMTR-MCNC: 158 MG/DL (ref 70–99)
GLUCOSE BLDC GLUCOMTR-MCNC: 165 MG/DL (ref 70–99)
GLUCOSE BLDC GLUCOMTR-MCNC: 165 MG/DL (ref 70–99)
GLUCOSE BLDC GLUCOMTR-MCNC: 172 MG/DL (ref 70–99)
GLUCOSE BLDC GLUCOMTR-MCNC: 59 MG/DL (ref 70–99)
GLUCOSE BLDC GLUCOMTR-MCNC: 73 MG/DL (ref 70–99)
GLUCOSE SERPL-MCNC: 110 MG/DL (ref 70–99)
HCT VFR BLD AUTO: 31.8 % (ref 40–53)
HGB BLD-MCNC: 10.1 G/DL (ref 13.3–17.7)
HGB BLD-MCNC: 10.3 G/DL (ref 13.3–17.7)
LYMPHOCYTES # BLD AUTO: 0.1 10E9/L (ref 0.8–5.3)
LYMPHOCYTES NFR BLD AUTO: 1.8 %
MACROCYTES BLD QL SMEAR: PRESENT
MAGNESIUM SERPL-MCNC: 1.8 MG/DL (ref 1.6–2.3)
MCH RBC QN AUTO: 33 PG (ref 26.5–33)
MCHC RBC AUTO-ENTMCNC: 31.8 G/DL (ref 31.5–36.5)
MCV RBC AUTO: 104 FL (ref 78–100)
MONOCYTES # BLD AUTO: 0.4 10E9/L (ref 0–1.3)
MONOCYTES NFR BLD AUTO: 6.2 %
NEUTROPHILS # BLD AUTO: 5.9 10E9/L (ref 1.6–8.3)
NEUTROPHILS NFR BLD AUTO: 92 %
PHOSPHATE SERPL-MCNC: 4.2 MG/DL (ref 2.5–4.5)
PLATELET # BLD AUTO: 181 10E9/L (ref 150–450)
PLATELET # BLD EST: ABNORMAL 10*3/UL
POTASSIUM SERPL-SCNC: 4.2 MMOL/L (ref 3.4–5.3)
POTASSIUM SERPL-SCNC: 4.2 MMOL/L (ref 3.4–5.3)
RBC # BLD AUTO: 3.06 10E12/L (ref 4.4–5.9)
SODIUM SERPL-SCNC: 137 MMOL/L (ref 133–144)
TACROLIMUS BLD-MCNC: 5.5 UG/L (ref 5–15)
TME LAST DOSE: NORMAL H
WBC # BLD AUTO: 6.4 10E9/L (ref 4–11)

## 2019-12-21 PROCEDURE — 25000132 ZZH RX MED GY IP 250 OP 250 PS 637: Mod: GY | Performed by: STUDENT IN AN ORGANIZED HEALTH CARE EDUCATION/TRAINING PROGRAM

## 2019-12-21 PROCEDURE — 25000132 ZZH RX MED GY IP 250 OP 250 PS 637: Mod: GY | Performed by: PHYSICIAN ASSISTANT

## 2019-12-21 PROCEDURE — 84100 ASSAY OF PHOSPHORUS: CPT | Performed by: SURGERY

## 2019-12-21 PROCEDURE — 36592 COLLECT BLOOD FROM PICC: CPT | Performed by: SURGERY

## 2019-12-21 PROCEDURE — 25800030 ZZH RX IP 258 OP 636: Performed by: PHYSICIAN ASSISTANT

## 2019-12-21 PROCEDURE — 25000131 ZZH RX MED GY IP 250 OP 636 PS 637: Mod: GY | Performed by: PHYSICIAN ASSISTANT

## 2019-12-21 PROCEDURE — 25000125 ZZHC RX 250: Performed by: PHYSICIAN ASSISTANT

## 2019-12-21 PROCEDURE — 85025 COMPLETE CBC W/AUTO DIFF WBC: CPT | Performed by: SURGERY

## 2019-12-21 PROCEDURE — 25000128 H RX IP 250 OP 636: Performed by: PHYSICIAN ASSISTANT

## 2019-12-21 PROCEDURE — 25000132 ZZH RX MED GY IP 250 OP 250 PS 637: Mod: GY | Performed by: SURGERY

## 2019-12-21 PROCEDURE — 00000146 ZZHCL STATISTIC GLUCOSE BY METER IP

## 2019-12-21 PROCEDURE — 83735 ASSAY OF MAGNESIUM: CPT | Performed by: SURGERY

## 2019-12-21 PROCEDURE — 25000131 ZZH RX MED GY IP 250 OP 636 PS 637: Mod: GY | Performed by: SURGERY

## 2019-12-21 PROCEDURE — 12000026 ZZH R&B TRANSPLANT

## 2019-12-21 PROCEDURE — 25000131 ZZH RX MED GY IP 250 OP 636 PS 637: Mod: GY | Performed by: STUDENT IN AN ORGANIZED HEALTH CARE EDUCATION/TRAINING PROGRAM

## 2019-12-21 PROCEDURE — 80197 ASSAY OF TACROLIMUS: CPT | Performed by: PHYSICIAN ASSISTANT

## 2019-12-21 PROCEDURE — 80048 BASIC METABOLIC PNL TOTAL CA: CPT | Performed by: SURGERY

## 2019-12-21 PROCEDURE — 84132 ASSAY OF SERUM POTASSIUM: CPT | Performed by: SURGERY

## 2019-12-21 PROCEDURE — 85018 HEMOGLOBIN: CPT | Performed by: SURGERY

## 2019-12-21 RX ORDER — CALCITRIOL 0.25 UG/1
0.25 CAPSULE, LIQUID FILLED ORAL DAILY
Status: DISCONTINUED | OUTPATIENT
Start: 2019-12-21 | End: 2019-12-22 | Stop reason: HOSPADM

## 2019-12-21 RX ORDER — SIMETHICONE 80 MG
80 TABLET,CHEWABLE ORAL EVERY 6 HOURS PRN
Status: DISCONTINUED | OUTPATIENT
Start: 2019-12-21 | End: 2019-12-22 | Stop reason: HOSPADM

## 2019-12-21 RX ORDER — DEXTROSE MONOHYDRATE 25 G/50ML
25-50 INJECTION, SOLUTION INTRAVENOUS
Status: DISCONTINUED | OUTPATIENT
Start: 2019-12-21 | End: 2019-12-21

## 2019-12-21 RX ORDER — NICOTINE POLACRILEX 4 MG
15-30 LOZENGE BUCCAL
Status: DISCONTINUED | OUTPATIENT
Start: 2019-12-21 | End: 2019-12-21

## 2019-12-21 RX ORDER — VITAMIN B COMPLEX
25 TABLET ORAL DAILY
Status: DISCONTINUED | OUTPATIENT
Start: 2019-12-21 | End: 2019-12-22 | Stop reason: HOSPADM

## 2019-12-21 RX ORDER — SULFAMETHOXAZOLE AND TRIMETHOPRIM 400; 80 MG/1; MG/1
1 TABLET ORAL DAILY
Status: DISCONTINUED | OUTPATIENT
Start: 2019-12-21 | End: 2019-12-22 | Stop reason: HOSPADM

## 2019-12-21 RX ORDER — VALGANCICLOVIR 450 MG/1
450 TABLET, FILM COATED ORAL DAILY
Status: DISCONTINUED | OUTPATIENT
Start: 2019-12-21 | End: 2019-12-22

## 2019-12-21 RX ADMIN — SULFAMETHOXAZOLE AND TRIMETHOPRIM 1 TABLET: 400; 80 TABLET ORAL at 11:09

## 2019-12-21 RX ADMIN — ATORVASTATIN CALCIUM 40 MG: 40 TABLET, FILM COATED ORAL at 20:11

## 2019-12-21 RX ADMIN — INSULIN GLARGINE 25 UNITS: 100 INJECTION, SOLUTION SUBCUTANEOUS at 13:50

## 2019-12-21 RX ADMIN — HUMAN INSULIN 3 UNITS/HR: 100 INJECTION, SOLUTION SUBCUTANEOUS at 01:08

## 2019-12-21 RX ADMIN — TACROLIMUS 3 MG: 1 CAPSULE ORAL at 07:57

## 2019-12-21 RX ADMIN — VALGANCICLOVIR 450 MG: 450 TABLET, FILM COATED ORAL at 11:09

## 2019-12-21 RX ADMIN — MYCOPHENOLATE MOFETIL 750 MG: 250 CAPSULE ORAL at 07:57

## 2019-12-21 RX ADMIN — ACETAMINOPHEN 975 MG: 325 TABLET, FILM COATED ORAL at 06:07

## 2019-12-21 RX ADMIN — ASPIRIN 81 MG: 81 TABLET, COATED ORAL at 07:57

## 2019-12-21 RX ADMIN — ACETAMINOPHEN 975 MG: 325 TABLET, FILM COATED ORAL at 22:17

## 2019-12-21 RX ADMIN — HEPARIN SODIUM 5000 UNITS: 5000 INJECTION, SOLUTION INTRAVENOUS; SUBCUTANEOUS at 13:15

## 2019-12-21 RX ADMIN — HEPARIN SODIUM 5000 UNITS: 5000 INJECTION, SOLUTION INTRAVENOUS; SUBCUTANEOUS at 20:12

## 2019-12-21 RX ADMIN — INSULIN ASPART 4 UNITS: 100 INJECTION, SOLUTION INTRAVENOUS; SUBCUTANEOUS at 09:10

## 2019-12-21 RX ADMIN — PANTOPRAZOLE SODIUM 40 MG: 40 TABLET, DELAYED RELEASE ORAL at 17:16

## 2019-12-21 RX ADMIN — MELATONIN 25 MCG: at 12:33

## 2019-12-21 RX ADMIN — ACETAMINOPHEN 975 MG: 325 TABLET, FILM COATED ORAL at 13:14

## 2019-12-21 RX ADMIN — PANTOPRAZOLE SODIUM 40 MG: 40 TABLET, DELAYED RELEASE ORAL at 07:57

## 2019-12-21 RX ADMIN — TACROLIMUS 3 MG: 1 CAPSULE ORAL at 17:41

## 2019-12-21 RX ADMIN — Medication 400 MG: at 11:08

## 2019-12-21 RX ADMIN — CALCITRIOL CAPSULES 0.25 MCG 0.25 MCG: 0.25 CAPSULE ORAL at 12:33

## 2019-12-21 RX ADMIN — Medication: at 16:00

## 2019-12-21 RX ADMIN — MYCOPHENOLATE MOFETIL 750 MG: 250 CAPSULE ORAL at 17:41

## 2019-12-21 RX ADMIN — SIMETHICONE CHEW TAB 80 MG 80 MG: 80 TABLET ORAL at 01:08

## 2019-12-21 RX ADMIN — SIMETHICONE CHEW TAB 80 MG 80 MG: 80 TABLET ORAL at 08:19

## 2019-12-21 RX ADMIN — SENNOSIDES AND DOCUSATE SODIUM 2 TABLET: 8.6; 5 TABLET ORAL at 20:12

## 2019-12-21 RX ADMIN — SODIUM CHLORIDE, POTASSIUM CHLORIDE, SODIUM LACTATE AND CALCIUM CHLORIDE: 600; 310; 30; 20 INJECTION, SOLUTION INTRAVENOUS at 06:10

## 2019-12-21 RX ADMIN — HUMAN INSULIN 1.5 UNITS/HR: 100 INJECTION, SOLUTION SUBCUTANEOUS at 12:34

## 2019-12-21 RX ADMIN — POLYETHYLENE GLYCOL 3350 17 G: 17 POWDER, FOR SOLUTION ORAL at 07:57

## 2019-12-21 RX ADMIN — SENNOSIDES AND DOCUSATE SODIUM 2 TABLET: 8.6; 5 TABLET ORAL at 07:57

## 2019-12-21 RX ADMIN — HEPARIN SODIUM 5000 UNITS: 5000 INJECTION, SOLUTION INTRAVENOUS; SUBCUTANEOUS at 07:57

## 2019-12-21 ASSESSMENT — ACTIVITIES OF DAILY LIVING (ADL)
ADLS_ACUITY_SCORE: 15

## 2019-12-21 ASSESSMENT — MIFFLIN-ST. JEOR: SCORE: 1480.41

## 2019-12-21 NOTE — PLAN OF CARE
"BP (!) 151/87 (BP Location: Right arm)   Pulse 98   Temp 98.4  F (36.9  C) (Oral)   Resp 16   Ht 1.746 m (5' 8.75\")   Wt 70.8 kg (156 lb 1.4 oz)   SpO2 100%   BMI 23.22 kg/m    [id number]  Afebrile, slightly hypertensive 150s/80s-100, on call notified and will defer to primary team today, all other VSS on RA. Last K 3.9 and Hgb stable at 10.3 Pt remains on insulin gtt with BGs 112-135 on algorithm 3. Abdominal pain well managed with scheduled tylenol. Pt denied nausea throughout shift but did request PRN simethicone for gas discomfort. Good appetite on regular diet, carbohydrates covered per orders. RIJ with insulin gtt and LR going at 100 ml/hr, caps changed.  RPIV x2 saline locked. RJP with 55 ml bloody bright red output. Dobbins patent with good output, over 2L this shift, cath cares done. No BM since surgery, pt is passing gas. Abdominal incision still covered with operative dressing. Up with SBA. Med card updated and lab book created. Please continue to monitor and update team with any changes.      "

## 2019-12-21 NOTE — CONSULTS
[facility]   Cardiology II Service / Advanced Heart Failure  Daily Consult Note      Patient: Murray Nicholson  MRN: [id number]  Admission Date: 12/19/2019  Hospital Day # 2    Assessment and Plan: Murray Nicholson is a 64 year old male with history of non ischemic cardiomyopathy s/p orthotopic heart transplant 6/14/18 (initially listed for heart/kidney transplant), also history of donor CAD, drug induced leukopenia, hx of perforated bowel s/p small bowel resection and ileostomy then takedown 3/2019, and klebsiella mouth ulcer post transplant who was admitted 12/19 for LDKT.     Recommendations:  -IS per renal transplant  -agree with amlodipine for BP control    # ESRD s/p LDKT 12/19/19  Post-op course unremarkable. Made 15 L urine yesterday, Cr 1.6 today. S/p thymo 12/19 and 12/20.    - IS per renal transplant    # Status post OHT on 6/14/18, history of NICM  # Donor 3-vessel CAD (pLAD 40%, OM1 50%, OM2 80%, distal RCA 40%)  # Chronic immunosuppression  * Rejection history: none  * Graft function: normal by last echocardiogram  * Last biopsy: 10/28/19 negative  * Intolerance to medications: developed leukopenia when on valcyte, bactrim, higher MMF dose     Immunosuppression:  - Tacrolimus trough level goal 8-10 per renal (pta goal 6-8)  - Mycophenolate 750 mg bid per renal (pta 500 mg bid)  - steroid taper per renal      Prophylaxis:  - PPI: pta pantoprazole 40 mg  - CAV/CAD: ASA 81 mg and atorvastatin 40 mg (Crestor cost prohibitive), per last angiogram may benefit from lifelong dapt, defer to Dr Ernst  - CMV high risk heart D+ R previously negative appears to have converted: kidney donor status unknown. He will be on Valcyte x 12 weeks per renal transplant protocol, agree with monthly CMV PCR.     # HTN. 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   # Hx perforated bowel s/p colostomy, resolved  #  "Hx klebsiella mouth ulcer, resolved  # Hx pseudomonas bacteremia, resolved   # Pancreatic cyst surveillance. Last MRI 4/2019 showed \"numerous pancreatic cystic foci are similar to those seen on 4/19/2018 with minimal increase in size of one of these cystic foci arising from the pancreatic body (now measuring 14 mm, previously 13 mm). These remain most consistent with side branch intraductal papillary mucinous neoplasms. No suspicious features on this unenhanced exam.\" Abdominal MRI q1-2 years per GI.       Ramya Suh DNP, NP-C  Advanced Heart Failure/Cardiology II Service  Pager [phone]    ================================================================    Subjective/24-Hr Events:   Last 24 hr care team notes reviewed. No overnight events. Patient reports feeling well. Significant uop. Anticipates being discharged in a few days. Some elevated BPs overnight.     ROS:  4 point ROS including respiratory, CV, GI and  (other than that noted in the HPI) is negative.     Medications: Reviewed in EPIC.     Physical Exam:   BP (!) 146/95 (BP Location: Right arm)   Pulse 98   Temp 98.1  F (36.7  C) (Oral)   Resp 16   Ht 1.746 m (5' 8.75\")   Wt 70.4 kg (155 lb 3.3 oz)   SpO2 96%   BMI 23.09 kg/m      GENERAL: Appears comfortable, in no distress.  HEENT: Eye symmetrical, no discharge or icterus bilaterally. Mucous membranes moist and without lesions.  NECK: Supple, JVD not visible at 90 degrees.   CV: RRR, +S1S2, no murmur, rub, or gallop.   RESPIRATORY: Respirations regular, even, and unlabored. Lungs CTA throughout.   GI: Soft and non distended with normoactive bowel sounds present in all quadrants. No tenderness, rebound, guarding.   EXTREMITIES: No peripheral edema. 2+ bilateral pedal pulses. Warm.   NEUROLOGIC: Alert and oriented x 3. No focal deficits.   MUSCULOSKELETAL: No joint swelling or tenderness.   SKIN: No jaundice. No rashes or lesions.     Labs:  CMP  Recent Labs   Lab 12/21/19  9654 " 12/21/19  0113 12/20/19  2012 12/20/19  1429  12/20/19  0514 12/19/19  2215 12/19/19  2045     --   --   --   --  130* 134 134   POTASSIUM 4.2 4.2 3.9 4.1   < > 4.0 4.5 4.6   CHLORIDE 109  --   --   --   --  98 105  --    CO2 24  --   --   --   --  21 20  --    ANIONGAP 4  --   --   --   --  11 10  --    *  --   --   --   --  307* 210* 183*   BUN 33*  --   --   --   --  42* 55*  --    CR 1.65*  --   --   --   --  4.89* 7.77*  --    GFRESTIMATED 43*  --   --   --   --  12* 7*  --    GFRESTBLACK 50*  --   --   --   --  13* 8*  --    TRINI 8.8  --   --   --   --  8.7 8.3*  --    MAG 1.8  --   --   --   --  1.8 1.4*  --    PHOS 4.2  --   --   --   --  4.7* 4.6*  --     < > = values in this interval not displayed.       CBC  Recent Labs   Lab 12/21/19  0554 12/21/19 0113 12/20/19 2012 12/20/19 1429 12/20/19 0514 12/19/19 2215   WBC 6.4  --   --   --   --  5.4 4.7   RBC 3.06*  --   --   --   --  3.29* 2.93*   HGB 10.1* 10.3* 10.6* 10.3*   < > 10.9* 9.7*   HCT 31.8*  --   --   --   --  33.7* 30.8*   *  --   --   --   --  102* 105*   MCH 33.0  --   --   --   --  33.1* 33.1*   MCHC 31.8  --   --   --   --  32.3 31.5   RDW 14.8  --   --   --   --  14.6 15.0     --   --   --   --  173 151    < > = values in this interval not displayed.       Time/Communication  I personally spent a total of 25 minutes. Of that 15 minutes was counseling/coordination of patient's care. Plan of care discussed with patient. See my note above for details.    Patient discussed with Dr. Austin.

## 2019-12-21 NOTE — PLAN OF CARE
Patient transferred to  from  at ~1630. AVSS on RA. Denies pain and nausea. Tolerating regular diet with good appetite. -275; insulin gtt titrated, currently in alg 2 on 4U/hr. Sliding scale administered per orders. Thymo currently infusing without issue. Dobbins with adequate UOP. PAULINA with bright/bloody output - minimal. Incision remains covered; marked and no extension. Up with Ax1. Will continue with plan of care.

## 2019-12-21 NOTE — PROVIDER NOTIFICATION
On call surgery text paged-  FYI pt's BPs this evening 154/97 & 158/100, orders to notify if diastolic >90. All other VSS on RA. Will continue to monitor.

## 2019-12-21 NOTE — PROGRESS NOTES
[facility], [facility]   Transplant Nephrology Progress Note  Date of Admission:  12/19/2019  Today's Date: 12/21/2019    Assessment & Plan   # LDKT: Trend down with a good UOP              - Baseline Cr ~ TBD              - Proteinuria: Not checked post transplant              - Date DSA Last Checked: Dec/2019      Latest DSA: No DSA at time of transplant              - BK Viremia: Not checked recently due to time from transplant              - Kidney Tx Biopsy: No     His allograft is functioning well (downtrending creatinine and large urine output.) We will keep IVF going, but will likely plan for discharge tomorrow and get him onto oral liquids     # Heart Transplant:  Functioning well, heart biopsy 10/2019 did not show rejection. He had a negative pre-LDKT angiogram. He has no DSA.      # Immunosuppression: Tacrolimus immediate release (goal 8-10) and Mycophenolate mofetil (goal 1.0-3.5)              - Changes: No, last tac level 7.0 (he was on tac prior to his transplant). Will aim for 8-10, next level 12/21              - Completed thymoglobulin induction 150/150 (2 doses)     # Infection Prophylaxis:   - PJP: Sulfa/TMP (Bactrim)  - CMV: Valcyte +/+  - Thrush: None     EBV +/+     # Hypertension: Borderline control;   Goal BP: < 140/90              - Volume status: Euvolemic              - Changes: No, on hydralazine + ACEi + amlodipine as OP. If sBP>160 tomorrow, we would start amlodipine 5mg     # Diabetes: Glucose generally running ~ 110-150, currently on insulin SubQ. Endocrinology will be helping with outpatient plan. He was not on insulin prior to transplant              - Management as per primary team.     # Anemia in Chronic Renal Disease: Hgb: Stable      ANASTASIYA: Yes              - Iron studies: Replete     # Mineral Bone Disorder:   - Secondary renal hyperparathyroidism; PTH level: Significantly elevated (601-1200 pg/ml)  - Vitamin D; level: Normal        On Supplement: No  -  Calcium; level: Normal        On Supplement: No  - Phosphorus; level: Normal        On Supplement: No     Will start calcitriol 0.25mcg    # Electrolytes:   - Potassium; level: Normal        On Supplement: No  - Magnesium; level: Normal        On Supplement: No  - Bicarbonate; level: Normal        On Supplement: No     # MGUS: Normal K/L ratio on last check (9/2019). We should follow this as an outpatient.     # Transplant History:  Etiology of Kidney Failure: Diabetes mellitus type 2  Tx: LDKT  Transplant: 12/19/2019 (Kidney), 6/14/2018 (Heart)  Donor Type: Living      Donor Class:   Crossmatch at time of Tx: negative  DSA at time of Tx: No  Significant changes in immunosuppression: None  Significant transplant-related complications: None     Recommendations were communicated to the primary team verbally.     Seen and discussed with Dr. Macie Kelly MD  Pager: [phone]    Attestation:  This patient has been seen and evaluated by me, Giovany Quijano MD.  I have reviewed the note and agree with plan of care as documented by the fellow.       Interval History   No overnight events. He is eating and drinking well. He had no fevers or chills overnight. He is not passing gas or stools. He has no chest pain or shortness of breath. Cr is trending down appropriately, he has 8L UOP over the last 24 hours. BP is high (150's)    Review of Systems   4 point ROS was obtained and negative except as noted in the Interval History.    MEDICATIONS:    acetaminophen  975 mg Oral Q8H     aspirin  81 mg Oral Daily     atorvastatin  40 mg Oral QPM     calcitRIOL  0.25 mcg Oral Daily     heparin ANTICOAGULANT  5,000 Units Subcutaneous TID     insulin aspart  1-7 Units Subcutaneous TID AC     insulin aspart  1-5 Units Subcutaneous At Bedtime     insulin aspart   Subcutaneous TID w/meals     insulin glargine  25 Units Subcutaneous Q24H     magnesium oxide  400 mg Oral Daily with lunch     mycophenolate  750 mg Oral BID IS      "pantoprazole  40 mg Oral BID AC     polyethylene glycol  17 g Oral Daily     senna-docusate  1 tablet Oral BID    Or     senna-docusate  2 tablet Oral BID     sodium chloride (PF)  10 mL Intracatheter Q8H     sulfamethoxazole-trimethoprim  1 tablet Oral Daily     tacrolimus  3 mg Oral BID IS     valGANciclovir  450 mg Oral Daily     cholecalciferol  25 mcg Oral Daily       IV fluid REPLACEMENT ONLY       insulin (regular) 1.5 Units/hr (19 1234)     lactated ringers 100 mL/hr at 19 0800     - MEDICATION INSTRUCTIONS -         Physical Exam   Temp  Av.2  F (36.8  C)  Min: 97.4  F (36.3  C)  Max: 100  F (37.8  C)  Arterial Line BP  Min: 132/62  Max: 148/68      Pulse  Av.5  Min: 76  Max: 98 Resp  Avg: 15.4  Min: 9  Max: 26  SpO2  Av.5 %  Min: 96 %  Max: 100 %    CVP (mmHg): 5 mmHgBP (!) 133/95   Pulse 91   Temp 97.8  F (36.6  C) (Oral)   Resp 16   Ht 1.746 m (5' 8.75\")   Wt 70.4 kg (155 lb 3.3 oz)   SpO2 99%   BMI 23.09 kg/m     Date 19 07 - 19 0659   Shift 3114-2344 2120-7128 6944-1064 24 Hour Total   INTAKE   P.O. 120   120   Shift Total(mL/kg) 120(1.7)   120(1.7)   OUTPUT   Urine 625   625   Drains 30   30   Shift Total(mL/kg) 655(9.3)   655(9.3)   Weight (kg) 70.4 70.4 70.4 70.4      Admit Weight: 73.8 kg (162 lb 11.2 oz)     GENERAL APPEARANCE: alert and no distress  HENT: mouth without ulcers or lesions  LYMPHATICS: no cervical or supraclavicular nodes  RESP: lungs clear to auscultation - no rales, rhonchi or wheezes  CV: regular rhythm, normal rate, no rub, no murmur  EDEMA: no LE edema bilaterally  ABDOMEN: soft, nondistended, nontender, bowel sounds normal  MS: extremities normal - no gross deformities noted, no evidence of inflammation in joints, no muscle tenderness  SKIN: no rash    Data   All labs reviewed by me.  CMP  Recent Labs   Lab 19  0554 19  0113 19  1429  19  0514 19  2215 19  2045     --   --  "  --   --  130* 134 134   POTASSIUM 4.2 4.2 3.9 4.1   < > 4.0 4.5 4.6   CHLORIDE 109  --   --   --   --  98 105  --    CO2 24  --   --   --   --  21 20  --    ANIONGAP 4  --   --   --   --  11 10  --    *  --   --   --   --  307* 210* 183*   BUN 33*  --   --   --   --  42* 55*  --    CR 1.65*  --   --   --   --  4.89* 7.77*  --    GFRESTIMATED 43*  --   --   --   --  12* 7*  --    GFRESTBLACK 50*  --   --   --   --  13* 8*  --    TRINI 8.8  --   --   --   --  8.7 8.3*  --    MAG 1.8  --   --   --   --  1.8 1.4*  --    PHOS 4.2  --   --   --   --  4.7* 4.6*  --     < > = values in this interval not displayed.     CBC  Recent Labs   Lab 12/21/19  0554 12/21/19  0113 12/20/19 2012 12/20/19  1429  12/20/19  0514 12/19/19  2215   HGB 10.1* 10.3* 10.6* 10.3*   < > 10.9* 9.7*   WBC 6.4  --   --   --   --  5.4 4.7   RBC 3.06*  --   --   --   --  3.29* 2.93*   HCT 31.8*  --   --   --   --  33.7* 30.8*   *  --   --   --   --  102* 105*   MCH 33.0  --   --   --   --  33.1* 33.1*   MCHC 31.8  --   --   --   --  32.3 31.5   RDW 14.8  --   --   --   --  14.6 15.0     --   --   --   --  173 151    < > = values in this interval not displayed.     INRNo lab results found in last 7 days.  ABG  Recent Labs   Lab 12/19/19 2045   PH 7.30*   PCO2 37   PO2 157*   HCO3 19*   O2PER 41      Urine Studies  Recent Labs   Lab Test 10/03/18  1407 09/17/18  0320 07/28/18  0528 07/18/18  0855   COLOR Peri Yellow Yellow Yellow   APPEARANCE Cloudy Clear Slightly Cloudy Cloudy   URINEGLC Negative 150* 300* 50*   URINEBILI Small* Negative Negative Negative   URINEKETONE Negative Negative Negative 5*   SG 1.020 1.010 1.017 1.016   UBLD Negative Negative Trace* Small*   URINEPH 5.0 7.5* 5.5 6.0   PROTEIN 100* 100* 30* 100*   NITRITE Negative Negative Negative Negative   LEUKEST Trace* Negative Negative Trace*   RBCU 3* 0 3* 4*   WBCU 10* 2 5 12*     Recent Labs   Lab Test 05/17/17  1225 04/19/17  1442 05/19/15  1006 02/12/14  1205  08/14/13  1341 07/08/13  1347 07/03/13  1410   UTPG 0.67* 0.93* 1.77* 2.25* 1.25* 1.04* 1.14*     PTH  Recent Labs   Lab Test 12/10/19  1121 04/09/17  1019 02/10/16  1357 07/03/13  1359   PTHI 821* 110* 247* 91*     Iron Studies  Recent Labs   Lab Test 12/10/19  1121 06/10/19  1315 07/10/18  0711 05/08/18  0954 07/19/17  1306 07/05/17  1204 06/21/17  1058 05/17/17  1214 04/19/17  1447 04/11/17  0722 03/15/17  1355 02/15/17  1023 01/04/17  1005 12/06/16  1126 11/18/16  0920 10/21/16  0952 09/14/16  1319 08/11/16  0904 06/02/16  0950 05/12/16  1154 04/05/16  1224 02/10/16  1357 12/02/15  1412 11/17/15  1012 09/01/15  1059 07/16/15  0829 06/16/15  1656 05/19/15  1000 05/18/15  1140 04/09/15  0900 01/07/14  1032 09/18/13  1024 08/14/13  1340 07/08/13  1336 07/03/13  1359 02/28/13  0952   IRON 84 118 66 58 46 26* 69 42 63 17* 30* 28* 43 34* 34* 77 24* 77 74 53 62 61 109 66 40 57 55 30* 35  --   --   --  23* <10* 32* 22*    251 238* 218* 263 228* 237* 210* 213* 176* 232* 215* 243 254 236* 234* 215* 264 233* 242 278 284 280 265 333 331 372 392 380  --   --   --  423 423 443* 425   IRONSAT 35 47* 28 27 18 12* 29 20 30 10* 13* 13* 18 13* 14* 33 11* 29 32 22 22 21 39 25 12* 17 15 8* 9*  --   --   --  5* <2* 7* 5*   ALBERTINA 445* 644* 771* 621* 369 542* 557* 806* 1,509* 1,194* 860* 432* 663* 460* 505* 420* 359 297 385 536* 620* 261 424* 504* 30 25* 22* 19*  --  19* 38 30 9* 7* 8* 13*     IMAGING:  All imaging studies reviewed by me.

## 2019-12-21 NOTE — PROGRESS NOTES
Transplant Surgery  Inpatient Daily Progress Note  12/21/2019    Assessment & Plan: 64 year old male with history of end stage kidney disease secondary to presumed diabetic nephropathy and hypertension. Initiated on HD July 2017.  He presently dialyzes THS at [facility] unit via a LUE AV graft.  His EDW ~ 71.5 kg.  Patient's cardiac function has been stable on tacrolimus and mycophenolate mofetil.  Admitted after LDKT on 12/19.     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    Graft function:  Kidney transplant: Cr 4.89 decreased from 7.7. Good UO  Heart transplant: Cardiology team aware of admit. Recommend continue lipitor, ASA and PPI. Discussed plan for IS with team.   Neuro:  Acute surgical pain: tylenol scheduled. Dilaudid 2-4 mg PRN  Immunosuppression management:  Thymo induction: 150 mg intra-op, 150 mg POD 1 - completed. Total thymo: 4.1 mg/kg  Solu-medrol 500 mg intra-op, 250 mg POD 1 - completed  Mycophenolate 750 mg BID   Tac 3 mg BID    Complexity of management:Medium. Contributing factors: induction  Hematology: Hgb stable  Cardiorespiratory: Stable.  Hx heart transplant: continue Lipitor, ASA  HTN: PTA amlodipine, hydralazine, lisinopril. SBP 130s-150s. No antihypertensives restarted.   GI/Nutrition: Regular diet. Senna/colace BID. Miralax daily.    Endocrine: DM type 2. Insulin gtt. Endocrine consulted. Has been on subcutaneous insulin in the past.   Fluid/Electrolytes: MIVF: decreased to LR 75 ml/hr. Electrolytes  "repletion  : Dobbins to remain due to new surgical anastomosis  Infectious disease: afebrile  Prophylaxis: DVT heparin subcutaneous. CMV: Valcyte x 12 weeks (CMV R+/D+) and PJP: bactrim indefinitely    Disposition: Transfer to      Medical Decision Making: Medium  Subsequent visit 76818 (moderate level decision making)    EARL/Fellow/Resident Provider: Paulina Hooks    Faculty: Shira Campbell MD  _________________________________________________________________  Transplant History: Admitted 12/19/2019 for kidney transplant.  12/19/2019 (Kidney), 6/14/2018 (Heart), Postoperative day: 555 (Heart), 2 (Kidney)     Interval History: History is obtained from the patient  Overnight events: Tolerating diet. Pain well controlled.     ROS:   A 10-point review of systems was negative except as noted above.    Meds:    acetaminophen  975 mg Oral Q8H     aspirin  81 mg Oral Daily     atorvastatin  40 mg Oral QPM     calcitRIOL  0.25 mcg Oral Daily     heparin ANTICOAGULANT  5,000 Units Subcutaneous TID     insulin aspart  1-7 Units Subcutaneous TID AC     insulin aspart  1-5 Units Subcutaneous At Bedtime     insulin aspart   Subcutaneous TID w/meals     insulin glargine  25 Units Subcutaneous Q24H     magnesium oxide  400 mg Oral Daily with lunch     mycophenolate  750 mg Oral BID IS     pantoprazole  40 mg Oral BID AC     polyethylene glycol  17 g Oral Daily     senna-docusate  1 tablet Oral BID    Or     senna-docusate  2 tablet Oral BID     sodium chloride (PF)  10 mL Intracatheter Q8H     sulfamethoxazole-trimethoprim  1 tablet Oral Daily     tacrolimus  3 mg Oral BID IS     valGANciclovir  450 mg Oral Daily     cholecalciferol  25 mcg Oral Daily       Physical Exam:     Admit Weight: 73.8 kg (162 lb 11.2 oz)    Current vitals:   BP (!) 142/105 (BP Location: Left arm)   Pulse 91   Temp 97.9  F (36.6  C) (Oral)   Resp 16   Ht 1.746 m (5' 8.75\")   Wt 70.4 kg (155 lb 3.3 oz)   SpO2 100%   BMI 23.09 kg/m  "         Vital sign ranges:    Temp:  [97.8  F (36.6  C)-98.4  F (36.9  C)] 97.9  F (36.6  C)  Pulse:  [91] 91  Heart Rate:  [86-97] 91  Resp:  [16] 16  BP: (133-158)/() 142/105  SpO2:  [96 %-100 %] 100 %  Patient Vitals for the past 24 hrs:   BP Temp Temp src Pulse Heart Rate Resp SpO2 Weight   12/21/19 1620 (!) 142/105 97.9  F (36.6  C) Oral -- 91 16 100 % --   12/21/19 1235 (!) 133/95 97.8  F (36.6  C) Oral 91 -- 16 99 % --   12/21/19 0720 (!) 146/95 98.1  F (36.7  C) Oral -- 86 16 96 % --   12/21/19 0629 -- -- -- -- -- -- -- 70.4 kg (155 lb 3.3 oz)   12/21/19 0357 (!) 151/87 98.4  F (36.9  C) Oral -- 89 16 100 % --   12/20/19 2359 (!) 158/100 98.4  F (36.9  C) Oral -- 94 16 96 % --   12/20/19 2019 (!) 154/97 98.2  F (36.8  C) Oral -- 97 16 100 % --     General Appearance: in no apparent distress.   Skin: normal, dry  Heart: NSR  Lungs: NLB on RA  Abdomen: soft, non distended, mildly ttp. he is not tender over the kidney graft. The wound is dry.  : doyle is present. .   Extremities: edema: absent.   Neurologic: awake, alert and oriented. Tremor absent.     Data:   CMP  Recent Labs   Lab 12/21/19  0554 12/21/19  0113  12/20/19  0514  12/19/19  2045     --   --  130*   < > 134   POTASSIUM 4.2 4.2   < > 4.0   < > 4.6   CHLORIDE 109  --   --  98   < >  --    CO2 24  --   --  21   < >  --    *  --   --  307*   < > 183*   BUN 33*  --   --  42*   < >  --    CR 1.65*  --   --  4.89*   < >  --    GFRESTIMATED 43*  --   --  12*   < >  --    GFRESTBLACK 50*  --   --  13*   < >  --    TRINI 8.8  --   --  8.7   < >  --    ICAW  --   --   --   --   --  4.5   MAG 1.8  --   --  1.8   < >  --    PHOS 4.2  --   --  4.7*   < >  --     < > = values in this interval not displayed.     CBC  Recent Labs   Lab 12/21/19  0554 12/21/19  0113  12/20/19  0814 12/20/19  0514   HGB 10.1* 10.3*   < > 10.5* 10.9*   WBC 6.4  --   --   --  5.4     --   --   --  173   A1C  --   --   --  4.3  --     < > = values in this  interval not displayed.     COAGSNo lab results found in last 7 days.    Invalid input(s): XA   Urinalysis  Recent Labs   Lab Test 10/03/18  1407 09/17/18  0320  05/17/17  1225  04/19/17  1442   COLOR Peri Yellow   < >  --    < >  --    APPEARANCE Cloudy Clear   < >  --    < >  --    URINEGLC Negative 150*   < >  --    < >  --    URINEBILI Small* Negative   < >  --    < >  --    URINEKETONE Negative Negative   < >  --    < >  --    SG 1.020 1.010   < >  --    < >  --    UBLD Negative Negative   < >  --    < >  --    URINEPH 5.0 7.5*   < >  --    < >  --    PROTEIN 100* 100*   < >  --    < >  --    NITRITE Negative Negative   < >  --    < >  --    LEUKEST Trace* Negative   < >  --    < >  --    RBCU 3* 0   < >  --    < >  --    WBCU 10* 2   < >  --    < >  --    UTPG  --   --   --  0.67*  --  0.93*    < > = values in this interval not displayed.     Virology:  CMV DNA Quantitation Specimen   Date Value Ref Range Status   10/28/2019 EDTA PLASMA  Final     Hepatitis C Antibody   Date Value Ref Range Status   12/10/2019 Nonreactive NR^Nonreactive Final     Comment:     Assay performance characteristics have not been established for newborns,   infants, and children

## 2019-12-22 ENCOUNTER — DOCUMENTATION ONLY (OUTPATIENT)
Dept: CARE COORDINATION | Facility: CLINIC | Age: 64
End: 2019-12-22

## 2019-12-22 VITALS
TEMPERATURE: 98.7 F | WEIGHT: 154.4 LBS | SYSTOLIC BLOOD PRESSURE: 148 MMHG | HEIGHT: 69 IN | BODY MASS INDEX: 22.87 KG/M2 | RESPIRATION RATE: 16 BRPM | DIASTOLIC BLOOD PRESSURE: 86 MMHG | OXYGEN SATURATION: 98 % | HEART RATE: 98 BPM

## 2019-12-22 LAB
ANION GAP SERPL CALCULATED.3IONS-SCNC: 4 MMOL/L (ref 3–14)
BASOPHILS # BLD AUTO: 0 10E9/L (ref 0–0.2)
BASOPHILS NFR BLD AUTO: 0 %
BUN SERPL-MCNC: 22 MG/DL (ref 7–30)
BURR CELLS BLD QL SMEAR: SLIGHT
CALCIUM SERPL-MCNC: 8.5 MG/DL (ref 8.5–10.1)
CHLORIDE SERPL-SCNC: 110 MMOL/L (ref 94–109)
CO2 SERPL-SCNC: 24 MMOL/L (ref 20–32)
CREAT SERPL-MCNC: 1.14 MG/DL (ref 0.66–1.25)
DIFFERENTIAL METHOD BLD: ABNORMAL
EOSINOPHIL # BLD AUTO: 0 10E9/L (ref 0–0.7)
EOSINOPHIL NFR BLD AUTO: 0.9 %
ERYTHROCYTE [DISTWIDTH] IN BLOOD BY AUTOMATED COUNT: 14.7 % (ref 10–15)
GFR SERPL CREATININE-BSD FRML MDRD: 67 ML/MIN/{1.73_M2}
GLUCOSE BLDC GLUCOMTR-MCNC: 109 MG/DL (ref 70–99)
GLUCOSE BLDC GLUCOMTR-MCNC: 114 MG/DL (ref 70–99)
GLUCOSE BLDC GLUCOMTR-MCNC: 127 MG/DL (ref 70–99)
GLUCOSE BLDC GLUCOMTR-MCNC: 175 MG/DL (ref 70–99)
GLUCOSE BLDC GLUCOMTR-MCNC: 84 MG/DL (ref 70–99)
GLUCOSE SERPL-MCNC: 78 MG/DL (ref 70–99)
HCT VFR BLD AUTO: 32.8 % (ref 40–53)
HGB BLD-MCNC: 10.1 G/DL (ref 13.3–17.7)
LYMPHOCYTES # BLD AUTO: 0.2 10E9/L (ref 0.8–5.3)
LYMPHOCYTES NFR BLD AUTO: 7 %
MACROCYTES BLD QL SMEAR: PRESENT
MAGNESIUM SERPL-MCNC: 2 MG/DL (ref 1.6–2.3)
MCH RBC QN AUTO: 33 PG (ref 26.5–33)
MCHC RBC AUTO-ENTMCNC: 30.8 G/DL (ref 31.5–36.5)
MCV RBC AUTO: 107 FL (ref 78–100)
MONOCYTES # BLD AUTO: 0.3 10E9/L (ref 0–1.3)
MONOCYTES NFR BLD AUTO: 9.6 %
NEUTROPHILS # BLD AUTO: 2.9 10E9/L (ref 1.6–8.3)
NEUTROPHILS NFR BLD AUTO: 82.5 %
OVALOCYTES BLD QL SMEAR: SLIGHT
PHOSPHATE SERPL-MCNC: 2.5 MG/DL (ref 2.5–4.5)
PLATELET # BLD AUTO: 185 10E9/L (ref 150–450)
PLATELET # BLD EST: ABNORMAL 10*3/UL
POIKILOCYTOSIS BLD QL SMEAR: SLIGHT
POTASSIUM SERPL-SCNC: 3.8 MMOL/L (ref 3.4–5.3)
RBC # BLD AUTO: 3.06 10E12/L (ref 4.4–5.9)
SODIUM SERPL-SCNC: 138 MMOL/L (ref 133–144)
TACROLIMUS BLD-MCNC: 7.5 UG/L (ref 5–15)
TME LAST DOSE: NORMAL H
WBC # BLD AUTO: 3.5 10E9/L (ref 4–11)

## 2019-12-22 PROCEDURE — 25000132 ZZH RX MED GY IP 250 OP 250 PS 637: Mod: GY | Performed by: SURGERY

## 2019-12-22 PROCEDURE — 25000131 ZZH RX MED GY IP 250 OP 636 PS 637: Mod: GY | Performed by: SURGERY

## 2019-12-22 PROCEDURE — 83735 ASSAY OF MAGNESIUM: CPT | Performed by: SURGERY

## 2019-12-22 PROCEDURE — 80197 ASSAY OF TACROLIMUS: CPT | Performed by: PHYSICIAN ASSISTANT

## 2019-12-22 PROCEDURE — 80048 BASIC METABOLIC PNL TOTAL CA: CPT | Performed by: SURGERY

## 2019-12-22 PROCEDURE — 25000131 ZZH RX MED GY IP 250 OP 636 PS 637: Mod: GY | Performed by: PHYSICIAN ASSISTANT

## 2019-12-22 PROCEDURE — 25800030 ZZH RX IP 258 OP 636: Performed by: PHYSICIAN ASSISTANT

## 2019-12-22 PROCEDURE — 84100 ASSAY OF PHOSPHORUS: CPT | Performed by: SURGERY

## 2019-12-22 PROCEDURE — 85025 COMPLETE CBC W/AUTO DIFF WBC: CPT | Performed by: SURGERY

## 2019-12-22 PROCEDURE — 36592 COLLECT BLOOD FROM PICC: CPT | Performed by: SURGERY

## 2019-12-22 PROCEDURE — 00000146 ZZHCL STATISTIC GLUCOSE BY METER IP

## 2019-12-22 PROCEDURE — 25000132 ZZH RX MED GY IP 250 OP 250 PS 637: Mod: GY | Performed by: STUDENT IN AN ORGANIZED HEALTH CARE EDUCATION/TRAINING PROGRAM

## 2019-12-22 PROCEDURE — 25000128 H RX IP 250 OP 636: Performed by: PHYSICIAN ASSISTANT

## 2019-12-22 PROCEDURE — 99232 SBSQ HOSP IP/OBS MODERATE 35: CPT | Performed by: NURSE PRACTITIONER

## 2019-12-22 PROCEDURE — 25000132 ZZH RX MED GY IP 250 OP 250 PS 637: Mod: GY | Performed by: PHYSICIAN ASSISTANT

## 2019-12-22 RX ORDER — PANTOPRAZOLE SODIUM 40 MG/1
40 TABLET, DELAYED RELEASE ORAL
Qty: 60 TABLET | Refills: 2 | Status: SHIPPED | OUTPATIENT
Start: 2019-12-22 | End: 2019-12-23

## 2019-12-22 RX ORDER — POLYETHYLENE GLYCOL 3350 17 G/17G
17 POWDER, FOR SOLUTION ORAL DAILY
Qty: 10 PACKET | Refills: 1 | Status: SHIPPED | OUTPATIENT
Start: 2019-12-23 | End: 2020-02-19

## 2019-12-22 RX ORDER — AMOXICILLIN 250 MG
2 CAPSULE ORAL 2 TIMES DAILY
Qty: 50 TABLET | Refills: 1 | Status: SHIPPED | OUTPATIENT
Start: 2019-12-22 | End: 2020-02-19

## 2019-12-22 RX ORDER — ACETAMINOPHEN 325 MG/1
650 TABLET ORAL EVERY 4 HOURS PRN
Qty: 50 TABLET | Refills: 1 | Status: SHIPPED | OUTPATIENT
Start: 2019-12-22 | End: 2021-08-19

## 2019-12-22 RX ORDER — CALCITRIOL 0.25 UG/1
0.25 CAPSULE, LIQUID FILLED ORAL DAILY
Qty: 30 CAPSULE | Refills: 3 | Status: SHIPPED | OUTPATIENT
Start: 2019-12-23 | End: 2020-01-15

## 2019-12-22 RX ORDER — TACROLIMUS 1 MG/1
3 CAPSULE ORAL 2 TIMES DAILY
Qty: 180 CAPSULE | Refills: 11 | Status: SHIPPED | OUTPATIENT
Start: 2019-12-22 | End: 2019-12-23

## 2019-12-22 RX ORDER — VITAMIN B COMPLEX
25 TABLET ORAL DAILY
Qty: 30 TABLET | Refills: 1 | Status: SHIPPED | OUTPATIENT
Start: 2019-12-23 | End: 2020-03-26

## 2019-12-22 RX ORDER — ONDANSETRON 4 MG/1
4 TABLET, ORALLY DISINTEGRATING ORAL EVERY 6 HOURS PRN
Qty: 10 TABLET | Refills: 1 | Status: ON HOLD | OUTPATIENT
Start: 2019-12-22 | End: 2020-02-06

## 2019-12-22 RX ORDER — HYDROMORPHONE HYDROCHLORIDE 2 MG/1
2 TABLET ORAL EVERY 4 HOURS PRN
Qty: 20 TABLET | Refills: 0 | Status: SHIPPED | OUTPATIENT
Start: 2019-12-22 | End: 2020-02-19

## 2019-12-22 RX ORDER — MYCOPHENOLATE MOFETIL 250 MG/1
750 CAPSULE ORAL 2 TIMES DAILY
Qty: 180 CAPSULE | Refills: 11 | Status: SHIPPED | OUTPATIENT
Start: 2019-12-22 | End: 2020-03-26

## 2019-12-22 RX ORDER — SULFAMETHOXAZOLE AND TRIMETHOPRIM 400; 80 MG/1; MG/1
1 TABLET ORAL DAILY
Qty: 30 TABLET | Refills: 11 | Status: SHIPPED | OUTPATIENT
Start: 2019-12-23 | End: 2020-01-20

## 2019-12-22 RX ORDER — VALGANCICLOVIR 450 MG/1
900 TABLET, FILM COATED ORAL DAILY
Status: DISCONTINUED | OUTPATIENT
Start: 2019-12-23 | End: 2019-12-22 | Stop reason: HOSPADM

## 2019-12-22 RX ORDER — MAGNESIUM OXIDE 400 MG/1
400 TABLET ORAL
Qty: 30 TABLET | Refills: 2 | Status: SHIPPED | OUTPATIENT
Start: 2019-12-23 | End: 2020-01-08

## 2019-12-22 RX ORDER — VALGANCICLOVIR 450 MG/1
900 TABLET, FILM COATED ORAL DAILY
Qty: 60 TABLET | Refills: 2 | Status: SHIPPED | OUTPATIENT
Start: 2019-12-23 | End: 2019-12-23

## 2019-12-22 RX ADMIN — TACROLIMUS 3 MG: 1 CAPSULE ORAL at 09:11

## 2019-12-22 RX ADMIN — HEPARIN SODIUM 5000 UNITS: 5000 INJECTION, SOLUTION INTRAVENOUS; SUBCUTANEOUS at 09:10

## 2019-12-22 RX ADMIN — ASPIRIN 81 MG: 81 TABLET, COATED ORAL at 09:12

## 2019-12-22 RX ADMIN — MYCOPHENOLATE MOFETIL 750 MG: 250 CAPSULE ORAL at 09:12

## 2019-12-22 RX ADMIN — ACETAMINOPHEN 975 MG: 325 TABLET, FILM COATED ORAL at 15:54

## 2019-12-22 RX ADMIN — ACETAMINOPHEN 975 MG: 325 TABLET, FILM COATED ORAL at 06:47

## 2019-12-22 RX ADMIN — Medication 400 MG: at 11:31

## 2019-12-22 RX ADMIN — VALGANCICLOVIR 450 MG: 450 TABLET, FILM COATED ORAL at 09:11

## 2019-12-22 RX ADMIN — PANTOPRAZOLE SODIUM 40 MG: 40 TABLET, DELAYED RELEASE ORAL at 15:54

## 2019-12-22 RX ADMIN — SODIUM CHLORIDE, POTASSIUM CHLORIDE, SODIUM LACTATE AND CALCIUM CHLORIDE: 600; 310; 30; 20 INJECTION, SOLUTION INTRAVENOUS at 05:41

## 2019-12-22 RX ADMIN — SULFAMETHOXAZOLE AND TRIMETHOPRIM 1 TABLET: 400; 80 TABLET ORAL at 09:12

## 2019-12-22 RX ADMIN — PANTOPRAZOLE SODIUM 40 MG: 40 TABLET, DELAYED RELEASE ORAL at 09:12

## 2019-12-22 RX ADMIN — HYDROMORPHONE HYDROCHLORIDE 2 MG: 2 TABLET ORAL at 09:10

## 2019-12-22 RX ADMIN — SENNOSIDES AND DOCUSATE SODIUM 2 TABLET: 8.6; 5 TABLET ORAL at 15:57

## 2019-12-22 RX ADMIN — CALCITRIOL CAPSULES 0.25 MCG 0.25 MCG: 0.25 CAPSULE ORAL at 09:11

## 2019-12-22 RX ADMIN — MELATONIN 25 MCG: at 09:12

## 2019-12-22 ASSESSMENT — ACTIVITIES OF DAILY LIVING (ADL)
ADLS_ACUITY_SCORE: 13
ADLS_ACUITY_SCORE: 13
ADLS_ACUITY_SCORE: 15
ADLS_ACUITY_SCORE: 13
ADLS_ACUITY_SCORE: 13

## 2019-12-22 ASSESSMENT — MIFFLIN-ST. JEOR: SCORE: 1476.76

## 2019-12-22 ASSESSMENT — PAIN DESCRIPTION - DESCRIPTORS: DESCRIPTORS: ACHING

## 2019-12-22 NOTE — PROVIDER NOTIFICATION
On call surgery text paged-  Pt's bp elevated above parameters at 162/104 recheck 163/102, all other VSS temp 98.4, RR 18, HR 89. Will continue to monitor and update team with any changes.      Update- spoke with Dr. Everett, no changes to current treatment plan. Will continue to monitor.

## 2019-12-22 NOTE — PHARMACY-TRANSPLANT NOTE
Solid Organ Transplant Recipient Prior to Discharge Note    64 year old male s/p kidney transplant on 12/19/19.    Pharmacy has monitored for medication interactions and immunosuppression levels in conjunction with the multidisciplinary team. In anticipation for discharge, medication therapy needs have been addressed daily throughout the current admission via multidisciplinary rounds and/or discussions, order verification, daily clinical pharmacy review, and communication with prescribers.

## 2019-12-22 NOTE — PLAN OF CARE
Orders to discharge patient received this evening. Paperwork printed and reviewed with patient at bedside. Questions answered and clarification made regarding follow up appointments and medications. Patient aware of ATC visit tomorrow 12/23 at 0700 - instructed to hold medications until after lab draw and bring meds with to appointment. Also instructed to bring updated med card/lab book to appointment. Patient insistent on leaving select PTA meds on medication card (especially BP meds) but transplant medication doses updated as ordered. Patient with good understanding of medications going forward. Patient discharging with PAULINA drain; educated on cares and sent with supplies to care for at home. Instructed to measure color/output from drain. Also aware of Insulin regimen. -176 today corrected per orders. Regular diet with good appetite. PIV x2 and TL I.J removed. Pain well controlled with PRN PO Dilaudid x1 and scheduled Tylenol. Op dressing removed per Dr. Campbell on rounds with team today. Incision stapled and OBED. Dobbins removed at 0700 and voiding adequate amounts after removal; PVR 4mL. BM x1 today. Stool softeners administered per orders. Patient belongings packed and sent with patient. Brought down to [facility] by friend who is providing ride to home.

## 2019-12-22 NOTE — PLAN OF CARE
"BP (!) 134/94 (BP Location: Left arm)   Pulse 91   Temp 98.1  F (36.7  C) (Oral)   Resp 16   Ht 1.746 m (5' 8.75\")   Wt 70 kg (154 lb 6.4 oz)   SpO2 99%   BMI 22.97 kg/m    [id number]  Afebrile, hypertensive up to 162/104, on call notified, pt came down without intervention to 134/94- all other VSS on RA. BGs 107 & 84, no insulin needed. Abdominal pain well managed with scheduled tylenol. Pt denied nausea throughout shift. Tolerating regular diet, but wasn't hungry for dinner. RIJ with LR going at 75 ml/hr. RPIV x2 saline locked. RJP with 50 ml serosang output. Dobbins patent with good output, over 2L this shift, cath cares done- plan to remove today. Pt reported 2 BM this shift and is passing lots of gas. Abdominal incision still covered with operative dressing. Up with SBA/independent in his room. Possible discharge home today or tomorrow. Please continue to monitor and update team with any changes.    "

## 2019-12-22 NOTE — PROGRESS NOTES
"                          Diabetes Consult Daily  Progress Note          Assessment/Plan:     Murray Nicholson is a 64 year old male with history of end stage kidney disease, diabetes mellitus type 2, hypertension, heart transplant secondary to ischemic cardiomyopathy, and hyperlipidemia, who was admitted on 12/19 for living donor kidney transplant. Endocrinology was consulted for assistance in controlling his blood sugars given DM2 with hyperglycemia in the setting of high steroid doses.     DM2:  A1C 4.3% 12/20/19.  Insulin ggt rates stabilized at ~1.5 units per hour overnight. Given dose reduction for safety in transitioning and likely some persistent effect overnight of prior steroid dose, plan to start with glargine dose below.     Plan:  -glargine 25 units q24 hours  -stop insulin ggt 2 hours post glargine dose  -aspart 1 unit per 10g for all meals and snacks  -aspart medium correction tid ac, modified hs  -glucose checks TID AC, hs, 0200       Outpatient diabetes follow up: TBD  Plan discussed with pt.           Interval History:     The last 24 hours progress and nursing notes reviewed.    Most recent steriod dose 250mg methylprednisolone 1303 12/20.    Pt without complaints today       Recent Labs   Lab 12/21/19  1638 12/21/19  1501 12/21/19  1348 12/21/19  1322 12/21/19  1304 12/21/19  1209  12/21/19  0554  12/20/19  0514  12/19/19  2215  12/19/19  2045   GLC  --   --   --   --   --   --   --  110*  --  307*  --  210*  --  183*   * 165* 128* 73 59* 101*   < >  --    < >  --    < >  --    < >  --     < > = values in this interval not displayed.          Diet:     Orders Placed This Encounter      Advance Diet as Tolerated: Regular Diet Adult    \       Exam:     BP (!) 148/93   Pulse 91   Temp 97.9  F (36.6  C) (Oral)   Resp 16   Ht 1.746 m (5' 8.75\")   Wt 70.4 kg (155 lb 3.3 oz)   SpO2 100%   BMI 23.09 kg/m    Physical Exam:    Gen: Alert, resting comfortably, in NAD   HEENT: mucous membranes are " moist  Resp: No respiratory distress   Neuro:oriented to situation, communicating clearly           Data:     Lab Results   Component Value Date    A1C 4.3 12/20/2019    A1C 5.2 12/10/2019    A1C 5.6 09/23/2019    A1C 5.2 06/10/2019    A1C 5.6 06/07/2019            CBC RESULTS:   Recent Labs   Lab Test 12/21/19  0554   WBC 6.4   RBC 3.06*   HGB 10.1*   HCT 31.8*   *   MCH 33.0   MCHC 31.8   RDW 14.8        Recent Labs   Lab Test 12/21/19  0554 12/21/19  0113  12/20/19  0514     --   --  130*   POTASSIUM 4.2 4.2   < > 4.0   CHLORIDE 109  --   --  98   CO2 24  --   --  21   ANIONGAP 4  --   --  11   *  --   --  307*   BUN 33*  --   --  42*   CR 1.65*  --   --  4.89*   TRINI 8.8  --   --  8.7    < > = values in this interval not displayed.     Liver Function Studies -   Recent Labs   Lab Test 12/10/19  1121   PROTTOTAL 7.6   ALBUMIN 4.0   BILITOTAL 1.0   ALKPHOS 372*   AST 11   ALT 16     Pt seen and staffed with Dr. Lyndsay Barrios MD  Endocrine Fellow     I was present with the fellow who participated in the service and in the documentation of the note. I have verified the history and personally performed the physical exam and medical decision making. I agree with the assessment and plan of care as documented in the note.     Keanu Umana MD  Division of Diabetes and Endocrinology  Department of Medicine

## 2019-12-22 NOTE — PLAN OF CARE
RN [id number]:  Afebrile; BP's 130-140's/'s (provider notified and no intervention ordered). Otherwise vitally stable on RA. Denies pain and nausea. Tolerating regular diet with good appetite. Insulin gtt stopped today per orders 2hr after Lantus dose administered. BG ranging  today (treated 59 with 2apple juices and recheck 73 & 128). Sliding scale administered + carb coverage to be given. Dobbins with adequate UOP. Ordered to be removed 12/22. PAULINA with 45 mL serosang output. No BM but is passing gas; laxatives given. Up independently in room and halls. Lab book up to date, med card requested not to be changed yet d/t lack of plan for BP meds. Education completed regarding medications and labs. Will continue to monitor and treat per plan of care.

## 2019-12-22 NOTE — CONSULTS
[facility]   Cardiology II Service / Advanced Heart Failure  Daily Consult Note      Patient: Murray Nicholson  MRN: [id number]  Admission Date: 12/19/2019  Hospital Day # 3    Assessment and Plan: Murray Nicholson is a 64 year old male with history of non ischemic cardiomyopathy s/p orthotopic heart transplant 6/14/18 (initially listed for heart/kidney transplant), also history of donor CAD, drug induced leukopenia, hx of perforated bowel s/p small bowel resection and ileostomy then takedown 3/2019, and klebsiella mouth ulcer post transplant who was admitted 12/19 for LDKT.     Recommendations:  -note: history of profound drug induced leukopenia post heart transplant requiring discontinuation of valcyte and bactrim  -will see patient in heart transplant clinic per protocol or sooner prn, contact heart transplant clinic (coordinator: TONY Moreira) with questions in the meantime    # ESRD s/p LDKT 12/19/19  Post-op course unremarkable. S/p thymo induction 12/19 and 12/20.  Made 3.4L urine yesterday, Cr 1.1 today.   - IS as below, management per renal transplant    # Status post OHT on 6/14/18, history of NICM  # Donor 3-vessel CAD (pLAD 40%, OM1 50%, OM2 80%, distal RCA 40%)  # Chronic immunosuppression  * Rejection history: none  * Graft function: normal by last echocardiogram  * Last biopsy: 10/28/19 negative  * Intolerance to medications: leukopenia when on valcyte, bactrim, higher MMF      Immunosuppression:  - Tacrolimus trough level goal 8-10 per renal (pta goal 6-8)  - Mycophenolate 750 mg bid per renal (pta 500 mg bid)  - steroid taper per renal      Prophylaxis:  - PPI: pta pantoprazole 40 mg  - CAV/CAD: ASA 81 mg and atorvastatin 40 mg (Crestor cost prohibitive), per last angiogram may benefit from lifelong dapt, defer to Dr Ernst  - CMV status previously high risk (heart donor +, Murray previously negative appears to have converted), kidney donor status unknown. He will be on Valcyte x 12 weeks per  "renal transplant protocol, agree with monthly CMV PCR.     # HTN. on lisinopril and amlodipine pta, agree with resuming amlodipine if indicated    # Hx of drug-induced leukopenia. Early post-heart transplant 6/2018 requiring discontinuation of bactrim, then valcyte, and lower MMF dose. WBC 3.5 today from 6.4.  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   # Hx perforated bowel s/p colostomy, resolved  # Hx klebsiella mouth ulcer, resolved  # Hx pseudomonas bacteremia, resolved   # Pancreatic cyst surveillance. Last MRI 4/2019 showed \"numerous pancreatic cystic foci are similar to those seen on 4/19/2018 with minimal increase in size of one of these cystic foci arising from the pancreatic body (now measuring 14 mm, previously 13 mm). These remain most consistent with side branch intraductal papillary mucinous neoplasms. No suspicious features on this unenhanced exam.\" Abdominal MRI q1-2 years per GI.       Ramya Suh, FRANK, NP-C  Advanced Heart Failure/Cardiology II Service  Pager [phone]    ================================================================    Subjective/24-Hr Events:   Last 24 hr care team notes reviewed. No overnight events. BPs elevated but no rx started. He feels well. Cr 1.1 today. Plan is for possible discharge.     ROS:  4 point ROS including respiratory, CV, GI and  (other than that noted in the HPI) is negative.     Medications: Reviewed in EPIC.     Physical Exam:   BP (!) 148/100 (BP Location: Right arm)   Pulse 91   Temp 98.2  F (36.8  C) (Oral)   Resp 16   Ht 1.746 m (5' 8.75\")   Wt 70 kg (154 lb 6.4 oz)   SpO2 100%   BMI 22.97 kg/m      GENERAL: Appears comfortable, in no distress.  HEENT: Eye symmetrical, no discharge or icterus bilaterally. Mucous membranes moist and without lesions.  NECK: Supple, JVD not visible at 90 degrees.   CV: RRR, +S1S2, no murmur, rub, or gallop.   RESPIRATORY: Respirations regular, " even, and unlabored. Lungs CTA throughout.   GI: Soft and non distended with normoactive bowel sounds present in all quadrants. No tenderness, rebound, guarding.   EXTREMITIES: No peripheral edema. 2+ bilateral pedal pulses. Warm.   NEUROLOGIC: Alert and oriented x 3. No focal deficits.   MUSCULOSKELETAL: No joint swelling or tenderness.   SKIN: No jaundice. No rashes or lesions.     Labs:  CMP  Recent Labs   Lab 12/22/19 0458 12/21/19  0554 12/21/19  0113 12/20/19 2012 12/20/19  0514 12/19/19 2215    137  --   --   --  130* 134   POTASSIUM 3.8 4.2 4.2 3.9   < > 4.0 4.5   CHLORIDE 110* 109  --   --   --  98 105   CO2 24 24  --   --   --  21 20   ANIONGAP 4 4  --   --   --  11 10   GLC 78 110*  --   --   --  307* 210*   BUN 22 33*  --   --   --  42* 55*   CR 1.14 1.65*  --   --   --  4.89* 7.77*   GFRESTIMATED 67 43*  --   --   --  12* 7*   GFRESTBLACK 78 50*  --   --   --  13* 8*   TRINI 8.5 8.8  --   --   --  8.7 8.3*   MAG 2.0 1.8  --   --   --  1.8 1.4*   PHOS 2.5 4.2  --   --   --  4.7* 4.6*    < > = values in this interval not displayed.       CBC  Recent Labs   Lab 12/22/19 0458 12/21/19  0554 12/21/19  0113 12/20/19 2012 12/20/19  0514 12/19/19 2215   WBC 3.5* 6.4  --   --   --  5.4 4.7   RBC 3.06* 3.06*  --   --   --  3.29* 2.93*   HGB 10.1* 10.1* 10.3* 10.6*   < > 10.9* 9.7*   HCT 32.8* 31.8*  --   --   --  33.7* 30.8*   * 104*  --   --   --  102* 105*   MCH 33.0 33.0  --   --   --  33.1* 33.1*   MCHC 30.8* 31.8  --   --   --  32.3 31.5   RDW 14.7 14.8  --   --   --  14.6 15.0    181  --   --   --  173 151    < > = values in this interval not displayed.       Time/Communication  I personally spent a total of 25 minutes. Of that 15 minutes was counseling/coordination of patient's care. Plan of care discussed with patient. See my note above for details.    Patient discussed with Dr. Austin.

## 2019-12-22 NOTE — PROGRESS NOTES
[facility], [place]   Transplant Nephrology Progress Note  Date of Admission:  12/19/2019  Today's Date: 12/22/2019    Assessment & Plan   # LDKT: Trend down with a good UOP              - Baseline Cr ~ TBD              - Proteinuria: Not checked post transplant              - Date DSA Last Checked: Dec/2019      Latest DSA: No DSA at time of transplant              - BK Viremia: Not checked recently due to time from transplant              - Kidney Tx Biopsy: No     Improving allograft function, UOP is 3.6L. Plan for discharge today.     # Heart Transplant:  Functioning well, heart biopsy 10/2019 did not show rejection. He had a negative pre-LDKT angiogram. He has no DSA.      # Immunosuppression: Tacrolimus immediate release (goal 8-10) and Mycophenolate mofetil (goal 1.0-3.5)              - Changes: No, last tac level 7.5 (12/22). Keep dose at 3mg BID               - Completed thymoglobulin induction 150/150 (2 doses)     # Infection Prophylaxis:   - PJP: Sulfa/TMP (Bactrim)  - CMV: Valcyte +/+  - Thrush: None     EBV +/+     # Hypertension: Borderline control;   Goal BP: < 140/90              - Volume status: Euvolemic              - Changes: No, on hydralazine + ACEi + amlodipine as OP. We recommend to start amlodipine 5mg daily.      # Diabetes: Glucose generally running ~ 110-150, currently on insulin SubQ. Endocrinology will be helping with outpatient plan. He was not on insulin prior to transplant              - Management as per primary team.     # Anemia in Chronic Renal Disease: Hgb: Stable      ANASTASIYA: Yes              - Iron studies: Replete     # Mineral Bone Disorder:   - Secondary renal hyperparathyroidism; PTH level: Significantly elevated (601-1200 pg/ml)  - Vitamin D; level: Normal        On Supplement: No  - Calcium; level: Normal        On Supplement: No  - Phosphorus; level: Normal        On Supplement: No     Continue calcitriol 0.25mcg.    # Electrolytes:   -  Potassium; level: Normal        On Supplement: No  - Magnesium; level: Normal        On Supplement: No  - Bicarbonate; level: Normal        On Supplement: No     # MGUS: Normal K/L ratio on last check (9/2019). We should follow this as an outpatient.     # Transplant History:  Etiology of Kidney Failure: Diabetes mellitus type 2  Tx: LDKT  Transplant: 12/19/2019 (Kidney), 6/14/2018 (Heart)  Donor Type: Living      Donor Class:   Crossmatch at time of Tx: negative  DSA at time of Tx: No  Significant changes in immunosuppression: None  Significant transplant-related complications: None     Recommendations were communicated to the primary team verbally.     Seen and discussed with Dr. Macie Kelly MD  Pager: [phone]    Attestation:  This patient has been seen and evaluated by me, Giovany Quijano MD.  I have reviewed the note and agree with plan of care as documented by the fellow.       Interval History   No overnight events. He denies chest pain and he has no shortness of breath. He is eating and drinking well. He has no diarrhea and denies nausea/vomiting. He is ambulating independently. Plan for discharge today. BP is elevated.     Review of Systems   4 point ROS was obtained and negative except as noted in the Interval History.    MEDICATIONS:    acetaminophen  975 mg Oral Q8H     aspirin  81 mg Oral Daily     atorvastatin  40 mg Oral QPM     calcitRIOL  0.25 mcg Oral Daily     heparin ANTICOAGULANT  5,000 Units Subcutaneous TID     insulin aspart  1-7 Units Subcutaneous TID AC     insulin aspart  1-5 Units Subcutaneous At Bedtime     insulin aspart   Subcutaneous TID w/meals     insulin glargine  18 Units Subcutaneous Q24H     magnesium oxide  400 mg Oral Daily with lunch     mycophenolate  750 mg Oral BID IS     pantoprazole  40 mg Oral BID AC     polyethylene glycol  17 g Oral Daily     senna-docusate  1 tablet Oral BID    Or     senna-docusate  2 tablet Oral BID     sodium chloride (PF)  10 mL  "Intracatheter Q8H     sulfamethoxazole-trimethoprim  1 tablet Oral Daily     tacrolimus  3 mg Oral BID IS     [START ON 12/23/2019] valGANciclovir  900 mg Oral Daily     cholecalciferol  25 mcg Oral Daily       IV fluid REPLACEMENT ONLY         Physical Exam   BP (!) 156/100 (BP Location: Right arm)   Pulse 98   Temp 98.7  F (37.1  C) (Oral)   Resp 16   Ht 1.746 m (5' 8.75\")   Wt 70 kg (154 lb 6.4 oz)   SpO2 98%   BMI 22.97 kg/m       GENERAL APPEARANCE: alert and no distress  HENT: mouth without ulcers or lesions  LYMPHATICS: no cervical or supraclavicular nodes  RESP: lungs clear to auscultation - no rales, rhonchi or wheezes  CV: regular rhythm, normal rate, no rub, no murmur  EDEMA: no LE edema bilaterally  ABDOMEN: soft, nondistended, nontender, bowel sounds normal  MS: extremities normal - no gross deformities noted, no evidence of inflammation in joints, no muscle tenderness  SKIN: no rash    Data   All labs reviewed by me.  CMP  Recent Labs   Lab 12/22/19  0458 12/21/19  0554 12/21/19  0113 12/20/19 2012 12/20/19  0514 12/19/19  2215    137  --   --   --  130* 134   POTASSIUM 3.8 4.2 4.2 3.9   < > 4.0 4.5   CHLORIDE 110* 109  --   --   --  98 105   CO2 24 24  --   --   --  21 20   ANIONGAP 4 4  --   --   --  11 10   GLC 78 110*  --   --   --  307* 210*   BUN 22 33*  --   --   --  42* 55*   CR 1.14 1.65*  --   --   --  4.89* 7.77*   GFRESTIMATED 67 43*  --   --   --  12* 7*   GFRESTBLACK 78 50*  --   --   --  13* 8*   TRINI 8.5 8.8  --   --   --  8.7 8.3*   MAG 2.0 1.8  --   --   --  1.8 1.4*   PHOS 2.5 4.2  --   --   --  4.7* 4.6*    < > = values in this interval not displayed.     CBC  Recent Labs   Lab 12/22/19  0458 12/21/19  0554 12/21/19  0113 12/20/19 2012 12/20/19  0514 12/19/19 2215   HGB 10.1* 10.1* 10.3* 10.6*   < > 10.9* 9.7*   WBC 3.5* 6.4  --   --   --  5.4 4.7   RBC 3.06* 3.06*  --   --   --  3.29* 2.93*   HCT 32.8* 31.8*  --   --   --  33.7* 30.8*   * 104*  --   --   --  " 102* 105*   MCH 33.0 33.0  --   --   --  33.1* 33.1*   MCHC 30.8* 31.8  --   --   --  32.3 31.5   RDW 14.7 14.8  --   --   --  14.6 15.0    181  --   --   --  173 151    < > = values in this interval not displayed.     INRNo lab results found in last 7 days.  ABG  Recent Labs   Lab 12/19/19  2045   PH 7.30*   PCO2 37   PO2 157*   HCO3 19*   O2PER 41      Urine Studies  Recent Labs   Lab Test 10/03/18  1407 09/17/18  0320 07/28/18  0528 07/18/18  0855   COLOR Peri Yellow Yellow Yellow   APPEARANCE Cloudy Clear Slightly Cloudy Cloudy   URINEGLC Negative 150* 300* 50*   URINEBILI Small* Negative Negative Negative   URINEKETONE Negative Negative Negative 5*   SG 1.020 1.010 1.017 1.016   UBLD Negative Negative Trace* Small*   URINEPH 5.0 7.5* 5.5 6.0   PROTEIN 100* 100* 30* 100*   NITRITE Negative Negative Negative Negative   LEUKEST Trace* Negative Negative Trace*   RBCU 3* 0 3* 4*   WBCU 10* 2 5 12*     Recent Labs   Lab Test 05/17/17  1225 04/19/17  1442 05/19/15  1006 02/12/14  1205 08/14/13  1341 07/08/13  1347 07/03/13  1410   UTPG 0.67* 0.93* 1.77* 2.25* 1.25* 1.04* 1.14*     PTH  Recent Labs   Lab Test 12/10/19  1121 04/09/17  1019 02/10/16  1357 07/03/13  1359   PTHI 821* 110* 247* 91*     Iron Studies  Recent Labs   Lab Test 12/10/19  1121 06/10/19  1315 07/10/18  0711 05/08/18  0954 07/19/17  1306 07/05/17  1204 06/21/17  1058 05/17/17  1214 04/19/17  1447 04/11/17  0722 03/15/17  1355 02/15/17  1023 01/04/17  1005 12/06/16  1126 11/18/16  0920 10/21/16  0952 09/14/16  1319 08/11/16  0904 06/02/16  0950 05/12/16  1154 04/05/16  1224 02/10/16  1357 12/02/15  1412 11/17/15  1012 09/01/15  1059 07/16/15  0829 06/16/15  1656 05/19/15  1000 05/18/15  1140 04/09/15  0900 01/07/14  1032 09/18/13  1024 08/14/13  1340 07/08/13  1336 07/03/13  1359 02/28/13  0952   IRON 84 118 66 58 46 26* 69 42 63 17* 30* 28* 43 34* 34* 77 24* 77 74 53 62 61 109 66 40 57 55 30* 35  --   --   --  23* <10* 32* 22*    697  238* 218* 263 228* 237* 210* 213* 176* 232* 215* 243 254 236* 234* 215* 264 233* 242 278 284 280 265 333 331 372 392 380  --   --   --  423 423 443* 425   IRONSAT 35 47* 28 27 18 12* 29 20 30 10* 13* 13* 18 13* 14* 33 11* 29 32 22 22 21 39 25 12* 17 15 8* 9*  --   --   --  5* <2* 7* 5*   ALBERTINA 445* 644* 771* 621* 369 542* 557* 806* 1,509* 1,194* 860* 432* 663* 460* 505* 420* 359 297 385 536* 620* 261 424* 504* 30 25* 22* 19*  --  19* 38 30 9* 7* 8* 13*     IMAGING:  All imaging studies reviewed by me.

## 2019-12-23 ENCOUNTER — OFFICE VISIT (OUTPATIENT)
Dept: INFUSION THERAPY | Facility: CLINIC | Age: 64
End: 2019-12-23
Attending: INTERNAL MEDICINE
Payer: MEDICARE

## 2019-12-23 ENCOUNTER — RECORDS - HEALTHEAST (OUTPATIENT)
Dept: ADMINISTRATIVE | Facility: OTHER | Age: 64
End: 2019-12-23

## 2019-12-23 ENCOUNTER — PATIENT OUTREACH (OUTPATIENT)
Dept: CARE COORDINATION | Facility: CLINIC | Age: 64
End: 2019-12-23

## 2019-12-23 ENCOUNTER — CARE COORDINATION (OUTPATIENT)
Dept: TRANSPLANT | Facility: CLINIC | Age: 64
End: 2019-12-23

## 2019-12-23 ENCOUNTER — TELEPHONE (OUTPATIENT)
Dept: FAMILY MEDICINE | Facility: CLINIC | Age: 64
End: 2019-12-23

## 2019-12-23 ENCOUNTER — TELEPHONE (OUTPATIENT)
Dept: ENDOCRINOLOGY | Facility: CLINIC | Age: 64
End: 2019-12-23

## 2019-12-23 ENCOUNTER — OFFICE VISIT (OUTPATIENT)
Dept: PHARMACY | Facility: CLINIC | Age: 64
End: 2019-12-23
Payer: COMMERCIAL

## 2019-12-23 VITALS
WEIGHT: 153.22 LBS | OXYGEN SATURATION: 100 % | BODY MASS INDEX: 22.79 KG/M2 | TEMPERATURE: 97.9 F | RESPIRATION RATE: 18 BRPM

## 2019-12-23 DIAGNOSIS — Z94.0 HTN, KIDNEY TRANSPLANT RELATED: ICD-10-CM

## 2019-12-23 DIAGNOSIS — Z94.0 KIDNEY REPLACED BY TRANSPLANT: Primary | ICD-10-CM

## 2019-12-23 DIAGNOSIS — R52 PAIN: ICD-10-CM

## 2019-12-23 DIAGNOSIS — D63.1 ANEMIA IN STAGE 3 CHRONIC KIDNEY DISEASE (H): ICD-10-CM

## 2019-12-23 DIAGNOSIS — N18.30 ANEMIA IN STAGE 3 CHRONIC KIDNEY DISEASE (H): ICD-10-CM

## 2019-12-23 DIAGNOSIS — N18.5 TYPE 2 DIABETES MELLITUS WITH STAGE 5 CHRONIC KIDNEY DISEASE NOT ON CHRONIC DIALYSIS, WITHOUT LONG-TERM CURRENT USE OF INSULIN (H): ICD-10-CM

## 2019-12-23 DIAGNOSIS — E55.9 VITAMIN D DEFICIENCY: ICD-10-CM

## 2019-12-23 DIAGNOSIS — Z94.1 HEART TRANSPLANTED (H): ICD-10-CM

## 2019-12-23 DIAGNOSIS — I15.1 HTN, KIDNEY TRANSPLANT RELATED: ICD-10-CM

## 2019-12-23 DIAGNOSIS — E83.42 HYPOMAGNESEMIA: ICD-10-CM

## 2019-12-23 DIAGNOSIS — Z94.1 HEART REPLACED BY TRANSPLANT (H): ICD-10-CM

## 2019-12-23 DIAGNOSIS — Z48.298 AFTERCARE FOLLOWING ORGAN TRANSPLANT: ICD-10-CM

## 2019-12-23 DIAGNOSIS — D84.9 IMMUNOSUPPRESSION (H): ICD-10-CM

## 2019-12-23 DIAGNOSIS — Z00.00 ROUTINE GENERAL MEDICAL EXAMINATION AT A HEALTH CARE FACILITY: Primary | ICD-10-CM

## 2019-12-23 DIAGNOSIS — N25.81 SECONDARY RENAL HYPERPARATHYROIDISM (H): ICD-10-CM

## 2019-12-23 DIAGNOSIS — E11.22 TYPE 2 DIABETES MELLITUS WITH STAGE 5 CHRONIC KIDNEY DISEASE NOT ON CHRONIC DIALYSIS, WITHOUT LONG-TERM CURRENT USE OF INSULIN (H): ICD-10-CM

## 2019-12-23 DIAGNOSIS — G47.33 OSA ON CPAP: ICD-10-CM

## 2019-12-23 DIAGNOSIS — Z79.899 LONG TERM USE OF DRUG: ICD-10-CM

## 2019-12-23 LAB
ANION GAP SERPL CALCULATED.3IONS-SCNC: 6 MMOL/L (ref 3–14)
BASOPHILS # BLD AUTO: 0 10E9/L (ref 0–0.2)
BASOPHILS NFR BLD AUTO: 0.9 %
BLD PROD TYP BPU: NORMAL
BLD PROD TYP BPU: NORMAL
BLD UNIT ID BPU: 0
BLD UNIT ID BPU: 0
BLOOD PRODUCT CODE: NORMAL
BLOOD PRODUCT CODE: NORMAL
BPU ID: NORMAL
BPU ID: NORMAL
BUN SERPL-MCNC: 20 MG/DL (ref 7–30)
BURR CELLS BLD QL SMEAR: SLIGHT
CALCIUM SERPL-MCNC: 8.5 MG/DL (ref 8.5–10.1)
CHLORIDE SERPL-SCNC: 108 MMOL/L (ref 94–109)
CO2 SERPL-SCNC: 22 MMOL/L (ref 20–32)
CREAT SERPL-MCNC: 1.01 MG/DL (ref 0.66–1.25)
DIFFERENTIAL METHOD BLD: ABNORMAL
EOSINOPHIL # BLD AUTO: 0.1 10E9/L (ref 0–0.7)
EOSINOPHIL NFR BLD AUTO: 3.6 %
ERYTHROCYTE [DISTWIDTH] IN BLOOD BY AUTOMATED COUNT: 14.5 % (ref 10–15)
GFR SERPL CREATININE-BSD FRML MDRD: 78 ML/MIN/{1.73_M2}
GLUCOSE SERPL-MCNC: 201 MG/DL (ref 70–99)
HCT VFR BLD AUTO: 32.9 % (ref 40–53)
HGB BLD-MCNC: 10.6 G/DL (ref 13.3–17.7)
LYMPHOCYTES # BLD AUTO: 0.4 10E9/L (ref 0.8–5.3)
LYMPHOCYTES NFR BLD AUTO: 13.4 %
MAGNESIUM SERPL-MCNC: 1.8 MG/DL (ref 1.6–2.3)
MCH RBC QN AUTO: 33.8 PG (ref 26.5–33)
MCHC RBC AUTO-ENTMCNC: 32.2 G/DL (ref 31.5–36.5)
MCV RBC AUTO: 105 FL (ref 78–100)
MONOCYTES # BLD AUTO: 0.3 10E9/L (ref 0–1.3)
MONOCYTES NFR BLD AUTO: 10.7 %
NEUTROPHILS # BLD AUTO: 2.1 10E9/L (ref 1.6–8.3)
NEUTROPHILS NFR BLD AUTO: 71.4 %
PHOSPHATE SERPL-MCNC: 2.4 MG/DL (ref 2.5–4.5)
PLATELET # BLD AUTO: 205 10E9/L (ref 150–450)
PLATELET # BLD EST: ABNORMAL 10*3/UL
POIKILOCYTOSIS BLD QL SMEAR: SLIGHT
POTASSIUM SERPL-SCNC: 4 MMOL/L (ref 3.4–5.3)
RBC # BLD AUTO: 3.14 10E12/L (ref 4.4–5.9)
SODIUM SERPL-SCNC: 137 MMOL/L (ref 133–144)
TACROLIMUS BLD-MCNC: 5.5 UG/L (ref 5–15)
TME LAST DOSE: NORMAL H
TRANSFUSION STATUS PATIENT QL: NORMAL
WBC # BLD AUTO: 3 10E9/L (ref 4–11)

## 2019-12-23 PROCEDURE — G0463 HOSPITAL OUTPT CLINIC VISIT: HCPCS

## 2019-12-23 PROCEDURE — 84100 ASSAY OF PHOSPHORUS: CPT | Performed by: INTERNAL MEDICINE

## 2019-12-23 PROCEDURE — 80048 BASIC METABOLIC PNL TOTAL CA: CPT | Performed by: INTERNAL MEDICINE

## 2019-12-23 PROCEDURE — 83735 ASSAY OF MAGNESIUM: CPT | Performed by: INTERNAL MEDICINE

## 2019-12-23 PROCEDURE — 80197 ASSAY OF TACROLIMUS: CPT | Performed by: INTERNAL MEDICINE

## 2019-12-23 PROCEDURE — 99607 MTMS BY PHARM ADDL 15 MIN: CPT | Performed by: PHARMACIST

## 2019-12-23 PROCEDURE — 99605 MTMS BY PHARM NP 15 MIN: CPT | Performed by: PHARMACIST

## 2019-12-23 PROCEDURE — 85025 COMPLETE CBC W/AUTO DIFF WBC: CPT | Performed by: INTERNAL MEDICINE

## 2019-12-23 RX ORDER — VALACYCLOVIR HYDROCHLORIDE 500 MG/1
1500 TABLET, FILM COATED ORAL 2 TIMES DAILY
Qty: 180 TABLET | Refills: 2 | Status: SHIPPED | OUTPATIENT
Start: 2019-12-23 | End: 2020-01-20

## 2019-12-23 RX ORDER — LANCETS
EACH MISCELLANEOUS
Qty: 100 EACH | Refills: 1 | Status: SHIPPED | OUTPATIENT
Start: 2019-12-23 | End: 2019-12-23

## 2019-12-23 RX ORDER — LANCETS
EACH MISCELLANEOUS
Qty: 100 EACH | Refills: 1 | Status: SHIPPED | OUTPATIENT
Start: 2019-12-23 | End: 2020-07-23

## 2019-12-23 RX ORDER — PANTOPRAZOLE SODIUM 40 MG/1
40 TABLET, DELAYED RELEASE ORAL DAILY
COMMUNITY
End: 2020-04-17

## 2019-12-23 RX ORDER — TACROLIMUS 1 MG/1
5 CAPSULE ORAL 2 TIMES DAILY
Qty: 180 CAPSULE | Refills: 11 | COMMUNITY
Start: 2019-12-23 | End: 2019-12-26

## 2019-12-23 NOTE — PROGRESS NOTES
Clinic Care Coordination Contact  Clinic Care Coordination Contact  OUTREACH    Referral Information:     Hospital follow up s/p kidney transplant            Universal Utilization: no concerns     Utilization    Last refreshed: 12/23/2019  3:21 PM:  Hospital Admissions 2           Last refreshed: 12/23/2019  3:21 PM:  ED Visits 0           Last refreshed: 12/23/2019  3:21 PM:  No Show Count (past year) 1              Current as of: 12/23/2019  3:21 PM              Clinical Concerns:  Current Medical Concerns:  Patient had kidney transplant. Having abnormal labs and will have daily lab draws for a few days. Had post op appointment with specialist today. Has many specialists and doesn't think it would be helpful to see PCP at this time. No specific concerns from patient today    Current Behavioral Concerns: none    Education Provided to patient: NA      Health Maintenance Reviewed:    Clinical Pathway: None    Medication Management:  Taking medications as prescribed      Functional Status:   independent    Living Situation:   not discussed    Diet/Exercise/Sleep:   not discussed    Transportation:   self        Psychosocial:   no concerns     Financial/Insurance:      Not discussed     Resources and Interventions:  Current Resources:    ;         Future Appointments              In 3 days  42 ATC; UC SPEC [facility]FUSION [facility]    In 3 days Giovany Quijano MD [facility]    In 1 week Eduardo Lea [facility] Medication Therapy Management, [facility]    In 2 weeks Maliha Jesus NP [facility]    In 3 weeks Markell Clemons MD [facility]    In 1 month Maliha Jesus NP [facility]    In 1 month  LAB [facility]    In 1 month Transplant,  Early [facility]    In 1 month  Transplant,  Early [facility], [facility]    In 1 month Eduardo Lea, [facility] Medication Therapy Management, [facility]    In 4 months [facility] Lab, [facility]    In 4 months Transplant,  Early [facility] Nephrology, [facility]    In 4 months Eduardo Lea, [facility] Medication Therapy Management, [facility]    In 6 months [facility] Lab, [facility]    In 6 months Transplant, [facility]ph[facility], [facility]    In 6 months Laura Roman, [facility] Solid Organ Transplant, [facility]          Plan: patient declines to follow up with Dr. Turcios at this time due to multiple specialty appointments needed but agrees to follow up with PCP in 4 weeks. No current needs per patient but does agree to accept my contact information for future needs. RN CC intro letter and 24 hour access plan mailed to home.     Kari Tran RN  [facility]-Care Coordination  [facility]  [facility]  [phone]

## 2019-12-23 NOTE — PATIENT INSTRUCTIONS
Recommendations from today's MTM visit:                                                      1. Call me if your next refill for Valtrex is $800, we may be able to use a different medication.     2. I sent down Lantus, continue this at 10 units once daily for now.     It was great to speak with you today.  I value your experience and would be very thankful for your time with providing feedback on our clinic survey. You may receive a survey via email or text message in the next few days.     Next MTM visit: Next Tuesday at 9:30am    To schedule another MTM appointment, please call the clinic directly or you may call the MTM scheduling line at [phone] or toll-free at [phone].     My Clinical Pharmacist's contact information:                                                      It was a pleasure talking with you today!  Please feel free to contact me with any questions or concerns you have.      Eduardo Lea, PharmD  [facility] Pharmacist    Phone: [phone]

## 2019-12-23 NOTE — PROGRESS NOTES
ACUTE TRANSPLANT NEPHROLOGY VISIT    Assessment & Plan   # LDKT: Trend down in creatinine at ~ 1.0 today.   - Baseline Cr ~ TBD   - Proteinuria: Not checked post transplant   - Date DSA Last Checked: Dec/2019      Latest DSA: No DSA at time of transplant   - BK Viremia: Not checked post transplant   - Kidney Tx Biopsy: No    # Heart Tx: Has been stable with negative cardiac biopsies and no DSA.  Followed by Cardiology.     # Immunosuppression: Tacrolimus immediate release (goal 8-10) and Mycophenolate mofetil (goal 1.0-3.5)   - Changes: No, pending today's drug level.    # Infection Prophylaxis:   - PJP: Sulfa/TMP (Bactrim)  - CMV: Valtrex; changed from Valcyte due to cost issues  - Thrush: None    # Hypertension: Controlled;  Goal BP: < 150/90   - Volume status: Euvolemic  EDW ~ 71.5 kg and today's weight is ~ 69.5 kg   - Changes: No, but encouraged patient to push fluids    # Diabetes: Controlled (HbA1c <7%) Last HbA1c: 4.3%   - Management as per primary care.    # Anemia in Chronic Renal Disease: Hgb: Stable      ANASTASIYA: No   - Iron studies: Replete    # Mineral Bone Disorder:   - Secondary renal hyperparathyroidism; PTH level: Significantly elevated (601-1200 pg/ml) and will continue on calcitriol  - Vitamin D; level: Low        On Supplement: Yes  - Calcium; level: Normal        On Supplement: No  - Phosphorus; level: Normal        On Supplement: No    # Electrolytes:   - Potassium; level: Normal        On Supplement: No  - Magnesium; level: Normal        On Supplement: Yes  - Bicarbonate; level: Normal        On Supplement: No    # PAD: Asymptomatic with no claudication symptoms.    # MGUS: Normal kappa/lambda ratio on last check (9/2019) with stable peak of 0.4.   - Recommend rechecking serum kappa/lambda light chain levels at 4 months post transplant.     # SHEELA on CPAP: Patient has been compliant.    # Medical Compliance: Yes    # Transplant History:  Etiology of Kidney Failure: Diabetes mellitus type 2  Tx:  "LDKT  Transplant: 12/19/2019 (Kidney), 6/14/2018 (Heart)  Donor Type: Living Donor Class:   Crossmatch at time of Tx: negative  DSA at time of Tx: No  Significant changes in immunosuppression: None  CMV IgG Ab High Risk Discordance (D+/R-): No  EBV IgG Ab High Risk Discordance (D+/R-): No  Significant transplant-related complications: None    Transplant Office Phone Number: [phone]    Assessment and plan was discussed with the patient and he voiced his understanding and agreement.    Return visit: Return in about 3 days (around 12/26/2019).    Giovany Quijano MD    Chief Complaint   Mr. Nicholson is a 64 year old here for hospital follow up after kidney transplant.     History of Present Illness    Mr. Nicholson reports feeling good overall with some medical complaints.  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  Crossmatch negative and no DSA.  His post operative course is fairly unremarkable.  His energy level is \"pretty good\" although not normal.  He is active and doing some walking.  Denies any chest pain or shortness of breath with exertion.  Appetite is low, but he is trying to eat and drink fluids.  No nausea, vomiting or diarrhea and is having bowel movements.  Some incisional pain.  No fever, sweats or chills.  No leg swelling.    Recent Hospitalizations:  [] No [x] Yes S/p LDKT   New Medical Issues: [x] No [] Yes    Decreased energy: [] No [x] Yes Improving   Chest pain or SOB with exertion:  [x] No [] Yes    Appetite change or weight change: [] No [x] Yes Low appetite   Nausea, vomiting or diarrhea:  [x] No [] Yes    Fever, sweats or chills: [x] No [] Yes    Leg swelling: [x] No [] Yes      Home BP: 120/80s    Review of Systems   A comprehensive review of systems was obtained and negative, except as noted in the HPI or PMH.    Problem List   Patient Active Problem List   Diagnosis     Esophageal reflux     Allergic " rhinitis     Anemia in chronic renal disease     Dyslipidemia     MGUS (monoclonal gammopathy of unknown significance)     Ascending aortic aneurysm (H)     Leukopenia     Sigmoid diverticulitis     Heart replaced by transplant (H)     Aortic stenosis     Status post coronary angiogram     Immunosuppression (H)     Type 2 diabetes mellitus (H)     Pancreatic cyst     Kidney replaced by transplant     Aftercare following organ transplant     HTN, kidney transplant related     Secondary renal hyperparathyroidism (H)     Vitamin D deficiency     Hypomagnesemia     SHEELA on CPAP       Social History   Social History     Tobacco Use     Smoking status: Former Smoker     Packs/day: 1.00     Years: 20.00     Pack years: 20.00     Types: Cigarettes     Start date: 1984     Last attempt to quit: 1994     Years since quittin.3     Smokeless tobacco: Never Used   Substance Use Topics     Alcohol use: No     Alcohol/week: 0.0 standard drinks     Drug use: No       Allergies   Allergies   Allergen Reactions     Norco [Hydrocodone-Acetaminophen] Nausea and Vomiting     Cats      Throat tightness     Isosorbide Other (See Comments)     hypotension     Penicillins Hives     Seasonal Allergies      rhinitis     Shrimp      Throat closes        Medications   Current Outpatient Medications   Medication Sig     valACYclovir (VALTREX) 500 MG tablet Take 3 tablets (1,500 mg) by mouth 2 times daily     acetaminophen (TYLENOL) 325 MG tablet Take 2 tablets (650 mg) by mouth every 4 hours as needed for other (multimodal surgical pain management along with NSAIDS and opioid medication as indicated based on pain control and physical function.)     aspirin 81 MG EC tablet Take 81 mg by mouth daily     atorvastatin (LIPITOR) 40 MG tablet Take 1 tablet (40 mg) by mouth daily     blood glucose (ACCU-CHEK GUIDE) test strip Use to test blood sugar 2 times daily or as directed.     blood glucose monitoring (ACCU-CHEK FASTCLIX) lancets Use  to test blood sugar 2 times daily or as directed.     calcitRIOL (ROCALTROL) 0.25 MCG capsule Take 1 capsule (0.25 mcg) by mouth daily     fluticasone (FLONASE) 50 MCG/ACT nasal spray Spray 1 spray into both nostrils daily (Patient taking differently: Spray 1 spray into both nostrils daily as needed )     HYDROmorphone (DILAUDID) 2 MG tablet Take 1 tablet (2 mg) by mouth every 4 hours as needed for moderate to severe pain     insulin glargine (LANTUS PEN) 100 UNIT/ML pen Inject 18 Units Subcutaneous every morning     magnesium oxide (MAG-OX) 400 MG tablet Take 1 tablet (400 mg) by mouth daily (with lunch)     melatonin 1 MG TABS tablet Take 2 tablets (2 mg) by mouth nightly as needed for sleep     mupirocin (BACTROBAN) 2 % external ointment Apply topically 3 times daily     mycophenolate (GENERIC EQUIVALENT) 250 MG capsule Take 3 capsules (750 mg) by mouth 2 times daily     ondansetron (ZOFRAN-ODT) 4 MG ODT tab Take 1 tablet (4 mg) by mouth every 6 hours as needed for nausea or vomiting     pantoprazole (PROTONIX) 40 MG EC tablet Take 40 mg by mouth daily     polyethylene glycol (MIRALAX/GLYCOLAX) packet Take 17 g by mouth daily     senna-docusate (SENOKOT-S/PERICOLACE) 8.6-50 MG tablet Take 2 tablets by mouth 2 times daily     sulfamethoxazole-trimethoprim (BACTRIM/SEPTRA) 400-80 MG tablet Take 1 tablet by mouth daily     tacrolimus (GENERIC EQUIVALENT) 1 MG capsule Take 5 capsules (5 mg) by mouth 2 times daily     Vitamin D3 (CHOLECALCIFEROL) 25 mcg (1000 units) tablet Take 1 tablet (25 mcg) by mouth daily     No current facility-administered medications for this visit.      Facility-Administered Medications Ordered in Other Visits   Medication     diatrizoate meglumine-sodium (GASTROGRAFIN/GASTROVIEW) 66-10 % solution 480 mL     Medications Discontinued During This Encounter   Medication Reason     valGANciclovir (VALCYTE) 450 MG tablet        Physical Exam   Vital Signs: There were no vitals taken for this  visit.    GENERAL APPEARANCE: alert and no distress  HENT: mouth without ulcers or lesions  LYMPHATICS: no cervical or supraclavicular nodes  RESP: lungs clear to auscultation - no rales, rhonchi or wheezes  CV: regular rhythm, normal rate, no rub, no murmur  EDEMA: no LE edema bilaterally  ABDOMEN: soft, nondistended, nontender, bowel sounds normal  MS: extremities normal - no gross deformities noted, no evidence of inflammation in joints, no muscle tenderness  SKIN: no rash  TX KIDNEY: mild TTP  DIALYSIS ACCESS:  LUE AV Graft with good thrill    Data     Renal Latest Ref Rng & Units 12/23/2019 12/22/2019 12/21/2019   Na 133 - 144 mmol/L 137 138 137   K 3.4 - 5.3 mmol/L 4.0 3.8 4.2   Cl 94 - 109 mmol/L 108 110(H) 109   CO2 20 - 32 mmol/L 22 24 24   BUN 7 - 30 mg/dL 20 22 33(H)   Cr 0.66 - 1.25 mg/dL 1.01 1.14 1.65(H)   Glucose 70 - 99 mg/dL 201(H) 78 110(H)   Ca  8.5 - 10.1 mg/dL 8.5 8.5 8.8   Mg 1.6 - 2.3 mg/dL 1.8 2.0 1.8     Bone Health Latest Ref Rng & Units 12/23/2019 12/22/2019 12/21/2019   Phos 2.5 - 4.5 mg/dL 2.4(L) 2.5 4.2   PTHi 18 - 80 pg/mL - - -   Vit D Def 20 - 75 ug/L - - -     Heme Latest Ref Rng & Units 12/23/2019 12/22/2019 12/21/2019   WBC 4.0 - 11.0 10e9/L 3.0(L) 3.5(L) 6.4   Hgb 13.3 - 17.7 g/dL 10.6(L) 10.1(L) 10.1(L)   Plt 150 - 450 10e9/L 205 185 181     Liver Latest Ref Rng & Units 12/24/2019 12/10/2019 10/28/2019   AP 40 - 150 U/L 278(H) 372(H) 318(H)   TBili 0.2 - 1.3 mg/dL 1.1 1.0 0.7   DBili 0.0 - 0.2 mg/dL 0.2 - -   ALT 0 - 70 U/L 18 16 14   AST 0 - 45 U/L 15 11 11   Tot Protein 6.8 - 8.8 g/dL 7.1 7.6 7.6   Albumin 3.4 - 5.0 g/dL 3.8 4.0 3.8     Pancreas Latest Ref Rng & Units 12/20/2019 12/10/2019 9/23/2019   A1C 0 - 5.6 % 4.3 5.2 5.6   Amylase 30 - 110 U/L - - -     Iron studies Latest Ref Rng & Units 12/10/2019 6/10/2019 7/10/2018   Iron 35 - 180 ug/dL 84 118 66   Iron sat 15 - 46 % 35 47(H) 28   Ferritin 26 - 388 ng/mL 445(H) 644(H) 771(H)     [facility] Txp Virology Latest Ref Rng &  Units 12/10/2019 10/28/2019 6/7/2019   CVM DNA Quant - - EDTA PLASMA Plasma, EDTA anticoagulant   Hep B Core NR:Nonreactive Nonreactive - Nonreactive        Recent Labs   Lab Test 12/21/19  0554 12/22/19  0458 12/23/19  0735   DOSTAC Test Not Performed Not Provided Not Provided   TACROL 5.5 7.5 5.5     Recent Labs   Lab Test 12/12/18  0612   DOSMPA Not Provided   MPACID 4.23*   MPAG 144.3*

## 2019-12-23 NOTE — TELEPHONE ENCOUNTER
----- Message from Macarena Barrios MD sent at 12/22/2019  7:06 PM CST -----  Could we call the pt and make an appt with Mira or one of the other diabetes APPs for diabetes follow up in 2 weeks?    Thank you,     Macarena Barrios MD  Endocrine Fellow

## 2019-12-23 NOTE — TELEPHONE ENCOUNTER
Pt can be scheduled in follow-up with Mira Bell or any other PA for follow-up in 2 weeks post-discharge.

## 2019-12-23 NOTE — PATIENT INSTRUCTIONS
Dear Murary Nicholson    Thank you for choosing [facility] ([facility]) for your transplant cares.  The following information is a summary of our appointment as well as important reminders.      We look forward in seeing you on your next appointment here [facility] ([facility]).  Please don t hesitate to call us at [phone] to reschedule any of your appointments or to speak with one of the [facility] registered nurses.  It was a pleasure taking care of you today.    Sincerely,    [facility]  [address] [place] [address]  [id number]  Phone:  [phone]

## 2019-12-23 NOTE — TELEPHONE ENCOUNTER
Pt declined to schedule as pt is being followed by KYARA Lea for insulin management. Pt wanted to confirm necessity with Eduardo before scheduling.    If pt calls back to schedule, can be RDI with Mira Bell or any other PA at his convenience.

## 2019-12-23 NOTE — LETTER
12/23/2019      RE: Murray Nicholson  [address]  [place] [address]       ACUTE TRANSPLANT NEPHROLOGY VISIT    Assessment & Plan   # LDKT: Trend down in creatinine at ~ 1.0 today.   - Baseline Cr ~ TBD   - Proteinuria: Not checked post transplant   - Date DSA Last Checked: Dec/2019      Latest DSA: No DSA at time of transplant   - BK Viremia: Not checked post transplant   - Kidney Tx Biopsy: No    # Heart Tx: Has been stable with negative cardiac biopsies and no DSA.  Followed by Cardiology.     # Immunosuppression: Tacrolimus immediate release (goal 8-10) and Mycophenolate mofetil (goal 1.0-3.5)   - Changes: No, pending today's drug level.    # Infection Prophylaxis:   - PJP: Sulfa/TMP (Bactrim)  - CMV: Valtrex; changed from Valcyte due to cost issues  - Thrush: None    # Hypertension: Controlled;  Goal BP: < 150/90   - Volume status: Euvolemic  EDW ~ 71.5 kg and today's weight is ~ 69.5 kg   - Changes: No, but encouraged patient to push fluids    # Diabetes: Controlled (HbA1c <7%) Last HbA1c: 4.3%   - Management as per primary care.    # Anemia in Chronic Renal Disease: Hgb: Stable      ANASTASIYA: No   - Iron studies: Replete    # Mineral Bone Disorder:   - Secondary renal hyperparathyroidism; PTH level: Significantly elevated (601-1200 pg/ml) and will continue on calcitriol  - Vitamin D; level: Low        On Supplement: Yes  - Calcium; level: Normal        On Supplement: No  - Phosphorus; level: Normal        On Supplement: No    # Electrolytes:   - Potassium; level: Normal        On Supplement: No  - Magnesium; level: Normal        On Supplement: Yes  - Bicarbonate; level: Normal        On Supplement: No    # PAD: Asymptomatic with no claudication symptoms.    # MGUS: Normal kappa/lambda ratio on last check (9/2019) with stable peak of 0.4.   - Recommend rechecking serum kappa/lambda light chain levels at 4 months post transplant.     # SHEELA on CPAP: Patient has been compliant.    # Medical Compliance:  "Yes    # Transplant History:  Etiology of Kidney Failure: Diabetes mellitus type 2  Tx: LDKT  Transplant: 12/19/2019 (Kidney), 6/14/2018 (Heart)  Donor Type: Living Donor Class:   Crossmatch at time of Tx: negative  DSA at time of Tx: No  Significant changes in immunosuppression: None  CMV IgG Ab High Risk Discordance (D+/R-): No  EBV IgG Ab High Risk Discordance (D+/R-): No  Significant transplant-related complications: None    Transplant Office Phone Number: [phone]    Assessment and plan was discussed with the patient and he voiced his understanding and agreement.    Return visit: Return in about 3 days (around 12/26/2019).    Giovany Quijano MD    Chief Complaint   Mr. Nicholson is a 64 year old here for hospital follow up after kidney transplant.     History of Present Illness    Mr. Nicholson reports feeling good overall with some medical complaints.  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  Crossmatch negative and no DSA.  His post operative course is fairly unremarkable.  His energy level is \"pretty good\" although not normal.  He is active and doing some walking.  Denies any chest pain or shortness of breath with exertion.  Appetite is low, but he is trying to eat and drink fluids.  No nausea, vomiting or diarrhea and is having bowel movements.  Some incisional pain.  No fever, sweats or chills.  No leg swelling.    Recent Hospitalizations:  [] No [x] Yes S/p LDKT   New Medical Issues: [x] No [] Yes    Decreased energy: [] No [x] Yes Improving   Chest pain or SOB with exertion:  [x] No [] Yes    Appetite change or weight change: [] No [x] Yes Low appetite   Nausea, vomiting or diarrhea:  [x] No [] Yes    Fever, sweats or chills: [x] No [] Yes    Leg swelling: [x] No [] Yes      Home BP: 120/80s    Review of Systems   A comprehensive review of systems was obtained and negative, except as noted in the HPI or " PMH.    Problem List   Patient Active Problem List   Diagnosis     Esophageal reflux     Allergic rhinitis     Anemia in chronic renal disease     Dyslipidemia     MGUS (monoclonal gammopathy of unknown significance)     Ascending aortic aneurysm (H)     Leukopenia     Sigmoid diverticulitis     Heart replaced by transplant (H)     Aortic stenosis     Status post coronary angiogram     Immunosuppression (H)     Type 2 diabetes mellitus (H)     Pancreatic cyst     Kidney replaced by transplant     Aftercare following organ transplant     HTN, kidney transplant related     Secondary renal hyperparathyroidism (H)     Vitamin D deficiency     Hypomagnesemia     SHEELA on CPAP       Social History   Social History     Tobacco Use     Smoking status: Former Smoker     Packs/day: 1.00     Years: 20.00     Pack years: 20.00     Types: Cigarettes     Start date: 1984     Last attempt to quit: 1994     Years since quittin.3     Smokeless tobacco: Never Used   Substance Use Topics     Alcohol use: No     Alcohol/week: 0.0 standard drinks     Drug use: No       Allergies   Allergies   Allergen Reactions     Norco [Hydrocodone-Acetaminophen] Nausea and Vomiting     Cats      Throat tightness     Isosorbide Other (See Comments)     hypotension     Penicillins Hives     Seasonal Allergies      rhinitis     Shrimp      Throat closes        Medications   Current Outpatient Medications   Medication Sig     valACYclovir (VALTREX) 500 MG tablet Take 3 tablets (1,500 mg) by mouth 2 times daily     acetaminophen (TYLENOL) 325 MG tablet Take 2 tablets (650 mg) by mouth every 4 hours as needed for other (multimodal surgical pain management along with NSAIDS and opioid medication as indicated based on pain control and physical function.)     aspirin 81 MG EC tablet Take 81 mg by mouth daily     atorvastatin (LIPITOR) 40 MG tablet Take 1 tablet (40 mg) by mouth daily     blood glucose (ACCU-CHEK GUIDE) test strip Use to test blood  sugar 2 times daily or as directed.     blood glucose monitoring (ACCU-CHEK FASTCLIX) lancets Use to test blood sugar 2 times daily or as directed.     calcitRIOL (ROCALTROL) 0.25 MCG capsule Take 1 capsule (0.25 mcg) by mouth daily     fluticasone (FLONASE) 50 MCG/ACT nasal spray Spray 1 spray into both nostrils daily (Patient taking differently: Spray 1 spray into both nostrils daily as needed )     HYDROmorphone (DILAUDID) 2 MG tablet Take 1 tablet (2 mg) by mouth every 4 hours as needed for moderate to severe pain     insulin glargine (LANTUS PEN) 100 UNIT/ML pen Inject 18 Units Subcutaneous every morning     magnesium oxide (MAG-OX) 400 MG tablet Take 1 tablet (400 mg) by mouth daily (with lunch)     melatonin 1 MG TABS tablet Take 2 tablets (2 mg) by mouth nightly as needed for sleep     mupirocin (BACTROBAN) 2 % external ointment Apply topically 3 times daily     mycophenolate (GENERIC EQUIVALENT) 250 MG capsule Take 3 capsules (750 mg) by mouth 2 times daily     ondansetron (ZOFRAN-ODT) 4 MG ODT tab Take 1 tablet (4 mg) by mouth every 6 hours as needed for nausea or vomiting     pantoprazole (PROTONIX) 40 MG EC tablet Take 40 mg by mouth daily     polyethylene glycol (MIRALAX/GLYCOLAX) packet Take 17 g by mouth daily     senna-docusate (SENOKOT-S/PERICOLACE) 8.6-50 MG tablet Take 2 tablets by mouth 2 times daily     sulfamethoxazole-trimethoprim (BACTRIM/SEPTRA) 400-80 MG tablet Take 1 tablet by mouth daily     tacrolimus (GENERIC EQUIVALENT) 1 MG capsule Take 5 capsules (5 mg) by mouth 2 times daily     Vitamin D3 (CHOLECALCIFEROL) 25 mcg (1000 units) tablet Take 1 tablet (25 mcg) by mouth daily     No current facility-administered medications for this visit.      Facility-Administered Medications Ordered in Other Visits   Medication     diatrizoate meglumine-sodium (GASTROGRAFIN/GASTROVIEW) 66-10 % solution 480 mL     Medications Discontinued During This Encounter   Medication Reason     valGANciclovir  (VALCYTE) 450 MG tablet        Physical Exam   Vital Signs: There were no vitals taken for this visit.    GENERAL APPEARANCE: alert and no distress  HENT: mouth without ulcers or lesions  LYMPHATICS: no cervical or supraclavicular nodes  RESP: lungs clear to auscultation - no rales, rhonchi or wheezes  CV: regular rhythm, normal rate, no rub, no murmur  EDEMA: no LE edema bilaterally  ABDOMEN: soft, nondistended, nontender, bowel sounds normal  MS: extremities normal - no gross deformities noted, no evidence of inflammation in joints, no muscle tenderness  SKIN: no rash  TX KIDNEY: mild TTP  DIALYSIS ACCESS:  LUE AV Graft with good thrill    Data     Renal Latest Ref Rng & Units 12/23/2019 12/22/2019 12/21/2019   Na 133 - 144 mmol/L 137 138 137   K 3.4 - 5.3 mmol/L 4.0 3.8 4.2   Cl 94 - 109 mmol/L 108 110(H) 109   CO2 20 - 32 mmol/L 22 24 24   BUN 7 - 30 mg/dL 20 22 33(H)   Cr 0.66 - 1.25 mg/dL 1.01 1.14 1.65(H)   Glucose 70 - 99 mg/dL 201(H) 78 110(H)   Ca  8.5 - 10.1 mg/dL 8.5 8.5 8.8   Mg 1.6 - 2.3 mg/dL 1.8 2.0 1.8     Bone Health Latest Ref Rng & Units 12/23/2019 12/22/2019 12/21/2019   Phos 2.5 - 4.5 mg/dL 2.4(L) 2.5 4.2   PTHi 18 - 80 pg/mL - - -   Vit D Def 20 - 75 ug/L - - -     Heme Latest Ref Rng & Units 12/23/2019 12/22/2019 12/21/2019   WBC 4.0 - 11.0 10e9/L 3.0(L) 3.5(L) 6.4   Hgb 13.3 - 17.7 g/dL 10.6(L) 10.1(L) 10.1(L)   Plt 150 - 450 10e9/L 205 185 181     Liver Latest Ref Rng & Units 12/24/2019 12/10/2019 10/28/2019   AP 40 - 150 U/L 278(H) 372(H) 318(H)   TBili 0.2 - 1.3 mg/dL 1.1 1.0 0.7   DBili 0.0 - 0.2 mg/dL 0.2 - -   ALT 0 - 70 U/L 18 16 14   AST 0 - 45 U/L 15 11 11   Tot Protein 6.8 - 8.8 g/dL 7.1 7.6 7.6   Albumin 3.4 - 5.0 g/dL 3.8 4.0 3.8     Pancreas Latest Ref Rng & Units 12/20/2019 12/10/2019 9/23/2019   A1C 0 - 5.6 % 4.3 5.2 5.6   Amylase 30 - 110 U/L - - -     Iron studies Latest Ref Rng & Units 12/10/2019 6/10/2019 7/10/2018   Iron 35 - 180 ug/dL 84 118 66   Iron sat 15 - 46 % 35  47(H) 28   Ferritin 26 - 388 ng/mL 445(H) 644(H) 771(H)     [facility] Txp Virology Latest Ref Rng & Units 12/10/2019 10/28/2019 6/7/2019   CVM DNA Quant - - EDTA PLASMA Plasma, EDTA anticoagulant   Hep B Core NR:Nonreactive Nonreactive - Nonreactive        Recent Labs   Lab Test 12/21/19  0554 12/22/19  0458 12/23/19  0735   DOSTAC Test Not Performed Not Provided Not Provided   TACROL 5.5 7.5 5.5     Recent Labs   Lab Test 12/12/18  0612   DOSMPA Not Provided   MPACID 4.23*   MPAG 144.3*       Giovany Quijano MD

## 2019-12-23 NOTE — LETTER
Health Care Home - Access Care Plan    About Me:    Patient Name:  Murray Mcneil    YOB: 1955  Age:                      64 year old   Hu MRN:     [id number] Telephone Information:   Home Phone [phone]   Mobile [phone]       Address:  [address] [place] [address] 5  [place] [address] Email address:  [email or web address]      Emergency Contact(s)   Name Relationship Lgl Grd Work Phone Home Phone Mobile Phone   1. JOAQUIM MCNEIL Son No  [phone]   2. LEMUEL MCNEIL Son No  [phone]   3. TYLER COMBS Friend No  [phone]             Health Maintenance:      My Access Plan  Medical Emergency 911   Questions or concerns during clinic hours Primary Clinic Line, I will call the clinic directly: [facility] - [phone]   24 Hour Appointment Line [phone] or  [phone]) (toll free)   24 Hour Nurse Line [phone] (toll free)   Questions or concerns outside clinic hours 24 Hour Appointment Line, I will call the after-hours on-call line:   [facility] ([facility] [phone] or [phone]) (toll-free)   Preferred Urgent Care     Preferred Hospital     Preferred Pharmacy [facility]     Behavioral Health Crisis Line The National Suicide Prevention Lifeline at 1-699.947.8473 or 911                     My Care Team Members  Patient Care Team       Relationship Specialty Notifications Start End    Yahir Turcios MD PCP - General Family Practice  2/19/18     Phone: [phone] Fax: [phone]         [address] [facility] [address]    Arcelia Blum MD MD Cardiology  5/4/15     Phone: [phone] Fax: [phone]         [address] [facility] [address] [facility] [address]    Bobby Mcconnell MD MD Surgery  6/29/15     Phone: [phone] Fax: [phone]         [address] [place] [address] [address] [facility] [address]    Yahir Turcios MD Assigned PCP   12/3/17      Phone: [phone] Fax: [phone]         [address] [facility] [address]    Amrita Tobin, RN Nurse Coordinator Thoracic Surgery (Cardiothoracic Vascular Surgery) Admissions 4/4/18     Phone: [phone] Pager: [phone]        Kristi Armas PA Physician Assistant Physician Assistant  5/1/18     Phone: [phone] Fax: [phone]         [address] [place] [address]    [facility] Care Coordinator   7/3/18     Ana Luisa Mcpherson MD MD Nephrology  7/11/18     Phone: [phone] Pager: [phone] Fax: [phone]        [address] [facility] [address] [address]    Lillie Ulloa, TONY Transplant Coordinator  All results, Admissions 7/30/18     Phone: [phone] Pager: [phone]        [facility] ([facility] ([place])  12/16/18     Phone: [phone]         Abram Moulton MD MD Family Practice  2/25/19     Phone: [phone] Fax: [phone]          [facility] [address]    Kaylin Fay, APRN CNP Nurse Practitioner Nurse Practitioner  4/24/19     Phone: [phone] Fax: [phone]         [address] [facility] [address]    Eduardo Lea, [facility] Pharmacist Pharmacist  12/23/19     Phone: [phone] Fax: [phone]         [address] [facility] [address]           My Medical and Care Information  Problem List   Patient Active Problem List   Diagnosis     Esophageal reflux     Allergic rhinitis     CKD (chronic kidney disease) stage 5, GFR less than 15 ml/min (H)     Hypertension goal BP (blood pressure) < 130/80     Anemia of chronic disease     Dyslipidemia     MGUS (monoclonal gammopathy of unknown significance)     Ascending aortic aneurysm (H)     Anemia, iron deficiency     Anemia in stage 5 chronic kidney disease (H)     Heart transplanted (H)     Leukopenia     Sigmoid diverticulitis     Heart replaced by transplant (H)     Aortic stenosis     Status post coronary angiogram     ESRD  (end stage renal disease) (H)     Immunosuppression (H)     Type 2 diabetes mellitus (H)     Pancreatic cyst     Kidney replaced by transplant      Current Medications and Allergies:  See printed Medication Report

## 2019-12-23 NOTE — PROGRESS NOTES
Kidney and Pancreas Transplant Coordinator Transition to Outpatient Discharge Note    Pt Name: Murray Nicholson  : 1955    Transplant Date: 19  Hospital Discharge Date: 19    Surgeon: Dr. Campbell  Transplant Coordinator: Cami Villarreal, RN    Murray Nicholson LDRT (paired exchange d/t DM 2, hypertension, right side side   Stent WAS placed.    CMV: D+/R+ : Valcyte 3 months; renal dosing  EBV: D+/R+    High Risk DSA: No.  PHS Increased Risk: No.    Immunosuppression:  BID  Tacro IR per protocol    Prophylaxis:   Bactrim, valcyte per protocol    Lines / drains in place at time of discharge:  NA      Pharmacy after discharge: [facility]  Lab after discharge: [facility]    Post-op course / additional monitoring:  NA    Patient Active Problem List   Diagnosis     Esophageal reflux     Allergic rhinitis     Anemia in chronic renal disease     Dyslipidemia     MGUS (monoclonal gammopathy of unknown significance)     Ascending aortic aneurysm (H)     Leukopenia     Sigmoid diverticulitis     Heart replaced by transplant (H)     Aortic stenosis     Status post coronary angiogram     Immunosuppression (H)     Type 2 diabetes mellitus (H)     Pancreatic cyst     Kidney replaced by transplant     Aftercare following organ transplant     HTN, kidney transplant related     Secondary renal hyperparathyroidism (H)     Vitamin D deficiency     Hypomagnesemia     SHEELA on CPAP       Education:  Writer met with Murray Nicholson. We discussed immunosuppression medications, indications, side effects, dose adjustments, and lab monitoring. Lab draw schedule was given to pt and fully explained - Mailers not given; no questions. Further education was provided on typical post transplant course, labs examined with thresholds, biopsy, infection prevention, office contact numbers, and when to call.     Pt questions for follow up: Murray declines at this time.    Discharge Transition Plan:   Pt will transition  home. Pt will have home care [facility].   Pt states having working BP monitor, scale, and thermometer at home.      Stent removal date: 1/27/19 with Elaine Jesus NP.      Patient has viewed My Transplant Place, and has no additional questions at this time. RNCC encouraged on-going review.  Patient has voiced understanding of ability to utilize MyChart for self-review of lab values.    Special/Additional needs: No.    Cami Villarreal RN, BSN, CCTC  Post-Kidney Transplant Coordinator  (ph) [phone]

## 2019-12-23 NOTE — PROGRESS NOTES
Diabetes Consult Daily  Progress Note          Assessment/Plan:     Murray Nicholson is a 64 year old male with history of end stage kidney disease, diabetes mellitus type 2, hypertension, heart transplant secondary to ischemic cardiomyopathy, and hyperlipidemia, who was admitted on 12/19 for living donor kidney transplant. Endocrinology was consulted for assistance in controlling his blood sugars given DM2 with hyperglycemia in the setting of high steroid doses.     DM2:  A1C 4.3% 12/20/19. Not on blood glucose lowering meds PTA since June 2019.   Last dose steroid 250mg methylprednisone 12/20.  Glucose below goal this morning. Pt discharging.     Plan for discharge:  -glargine 18 units q24 hours with instructions to decrease dose by 2 units if morning fasting glucose is below 80mg, maintain decreased dosage for subsequent days with further reduction in dose if morning fasting is again below 80mg/dl   -(no novolog)      Outpatient diabetes follow up: 2 weeks with [facility] endocrine   Plan discussed with pt.    I was present with the fellow who participated in the service and in the documentation of the note. I have verified the history and personally performed the physical exam and medical decision making. I agree with the assessment and plan of care as documented in the note.     Keanu Umana MD  Division of Diabetes and Endocrinology  Department of Medicine                 Interval History:     The last 24 hours progress and nursing notes reviewed.    Was not hungry for dinner 12/21.    Glucose below goal this morning at 78    Recent Labs   Lab 12/22/19  1509 12/22/19  1425 12/22/19  1135 12/22/19  0918 12/22/19  0458 12/22/19  0153 12/21/19  2111  12/21/19  0554  12/20/19  0514  12/19/19  2215  12/19/19  2045   GLC  --   --   --   --  78  --   --   --  110*  --  307*  --  210*  --  183*   * 109* 175* 127*  --  84 107*   < >  --    < >  --    < >  --    < >  --     < > =  "values in this interval not displayed.          Diet:     Orders Placed This Encounter      Advance Diet as Tolerated: Regular Diet Adult    \       Exam:     BP (!) 148/86 (BP Location: Right arm)   Pulse 98   Temp 98.7  F (37.1  C) (Oral)   Resp 16   Ht 1.746 m (5' 8.75\")   Wt 70 kg (154 lb 6.4 oz)   SpO2 98%   BMI 22.97 kg/m    Physical Exam:    Gen: Alert, resting comfortably, in NAD   HEENT: mucous membranes are moist  Resp: No respiratory distress   Neuro:oriented to situation, communicating clearly           Data:     Lab Results   Component Value Date    A1C 4.3 12/20/2019    A1C 5.2 12/10/2019    A1C 5.6 09/23/2019    A1C 5.2 06/10/2019    A1C 5.6 06/07/2019            CBC RESULTS:   Recent Labs   Lab Test 12/21/19  0554   WBC 6.4   RBC 3.06*   HGB 10.1*   HCT 31.8*   *   MCH 33.0   MCHC 31.8   RDW 14.8        Recent Labs   Lab Test 12/21/19  0554 12/21/19  0113  12/20/19  0514     --   --  130*   POTASSIUM 4.2 4.2   < > 4.0   CHLORIDE 109  --   --  98   CO2 24  --   --  21   ANIONGAP 4  --   --  11   *  --   --  307*   BUN 33*  --   --  42*   CR 1.65*  --   --  4.89*   TRINI 8.8  --   --  8.7    < > = values in this interval not displayed.     Liver Function Studies -   Recent Labs   Lab Test 12/10/19  1121   PROTTOTAL 7.6   ALBUMIN 4.0   BILITOTAL 1.0   ALKPHOS 372*   AST 11   ALT 16     Pt seen and staffed with Dr. Lyndsay Barrios MD  Endocrine Fellow               "

## 2019-12-23 NOTE — PROGRESS NOTES
SUBJECTIVE/OBJECTIVE:                Murray Nicholson is a 64 year old male coming in for a transitions of care visit.  He was discharged from [facility] on 12/22/19 for kidney txp .     Chief Complaint: Initial post txp med review. Did not get lantus from discharge pharmacy. BG this morning 110.    Allergies/ADRs: Reviewed in Epic  Tobacco:  reports that he quit smoking about 25 years ago. His smoking use included cigarettes. He started smoking about 36 years ago. He has a 20.00 pack-year smoking history. He has never used smokeless tobacco.  Alcohol: not currently using  Caffeine: 0-1 cups/day of coffee, 1 cups/day of tea  PMH: Reviewed in Epic    Medication Adherence/Access:  Patient uses pill box(es).  Patient takes medications 4 time(s) per day. 7am, 1-2, 4-6, 7-8:30pm  Per patient, misses medication 0 times per week.   Medication barriers: none. Is concerned about cost of insulin, Prescribed Valcyte which cost $800 dollars.   The patient fills medications at [place]: NO, fills medications at [facility].  Pt seen in [facility] today: yes  Patient seen in hospital by [facility]: yes  Patient receive supplies: yes  Stools: watery/ chunky. Twice yesterday. Using Miralax/ Senna.  Reviewed Anti-rejection doses with bottles: no    Renal/ post heart Transplant:  Current immunosuppressants include Tacrolimus 3mg BID (0-6 months post tx, goal 8-10) and MMF 750mg BID (goal 0-6 month post tx: 1-3.5).  Pt reports no side effects  Transplant date: 12/19/19 renal, heart txp in 6/14/2018  Estimated Creatinine Clearance: 72.6 mL/min (based on SCr of 1.01 mg/dL).  CMV prophylaxis: No medications, pt did not  Valtrex because it was 800$.  PCP prophylaxis: Bactrim S S daily  PPI use: Pantoprazole 40mg BID- GP took him down to one daily.   Current supplements for electrolyte replacement: Mag Oxide 400mg daily ( 2 hours from MMF) .  Magnesium   Date Value Ref Range Status   12/23/2019 1.8 1.6 - 2.3 mg/dL Final   Tx Coordinator: Jennifer  Bob Villarreal MD: Dr. Quijano, Using Med Card: Yes  Immunizations: annual flu shot 2019; Egnsthppzn95:  2005, 2011; Prevnar 13: 2015; TDaP:  ; Shingrix: Zostavax     Pain: Pt is taking Hydromorphone 2mg once to twice daily, APAP prn. Pain well controlled at rest. Increases on movement.     Diabetes:  Pt currently taking no insulin, Lantus listed on his med list, but he was not discharged with this. He brings in old Novolog and Humulin NPH pens today wondering if he should use these.  Pt is not experiencing side effects.  SMBG: two times daily.   Ranges (patient reported): 110 this morning (lab was 201)  Patient is not experiencing hypoglycemia  Recent symptoms of high blood sugar? none    Today's Vitals: There were no vitals taken for this visit.    ASSESSMENT:                 Medication Adherence: fair, see below.    Renal/ post heart Transplant:  Dr. Quijano requesting Valacyclovir dose to replace Valcyte today as patient is low risk. At current CrCl, Valacyclovir 1500mg BID would be correct dose. Pt may reduce Pantoprazole to once daily as he is not having any GERD sx.     Pain: Stable.     Diabetes: The pens he brought in today are  as they have been out of the fridge for >1 month.  Reordering Lantus for patient as he was not discharged with it. He will start at 10 units I will call him to discuss BG next week.     PLAN:                Post Discharge Medication Reconciliation Status: discharge medications reconciled and changed, per note/orders (see AVS).    Pt to...  1. Start Lantus 10 units once daily.  2. Valacyclovir 1500mg BID for CMV prevention as Valcyte is too expensive.  3. May reduce Pantoprazole to 40mg once daily if he is not having any GERD sx.     I spent 45 minutes with this patient today. I offer these suggestions for consideration by txp team. A copy of the visit note was provided to the patient's referring provider.    Will follow up in 1 week.    The patient was given a summary  of these recommendations as an after visit summary.    Eduardo Lea, PharmD  [place] Pharmacist    Phone: [phone]

## 2019-12-23 NOTE — PROGRESS NOTES
"Murray Nicholson came to [facility] today for a lab and assess following a kidney transplant on 19.      Discharge date: 19  Transplant coordinator: Jennifer  Phone number patient can be reached at: [phone]      Physical Assessment:  See physical assessment located under \"Document Flowsheets\".  Incision site: staples; CDI  Lines: PAULINA drain. Emptied 20cc since discharge yesterday  Urine clarity: clear yellow per pt  Hydration: reminded about drinking 2L daily  Nutrition: fair appetite; no nausea   Last BM: yesterday; no diarrhea  Pain: incisional; controlled adequately with prn dilaudid/tylenol     Labs drawn by [facility] staff Yes    Plan of care for today: Pt and vitals assessed. Pt did have orthostatic drop with standing.  Sittin/78  Standin/75  Pt was seen today by MD; Dr. Quijano  Transplant pharmacist  Coordinator Jennifer JIMENEZ reviewed medications. Pt did not bring pill bottles or medication box. States \"I know all my medications well.\" Pt also wanted to keep BP meds on med card that he's not taking so they are there if he restarts in the am. Plan for pt to return on Thursday unless drug level not therapeutic then he will be back tomorrow for labs. Will update pt with plan this afternoon.     Medication changes: Stop Valcyte; start Valtrex (per pt request due to cost of Valcyte)  Lantus insulin sent to pharmacy as it wasn't at discharge pharmacy yesterday    After Tacrolimus level back. Dr. Quijano made plan for pt to get labs drawn tomorrow. Pt will come to 1st floor on  to get labs and on-call coordinator to follow-up if any changes need to be made. Pt will then return to [facility] on Thursday for LBA.    Medications administered:      Patient education:    The following teaching topics were addressed: Importance of drinking 2L of non-caffeinated fluids daily, Medications (purposes, doses and times of administration) and Plan of care   Patient verbalized understanding and all questions answered.    Drug " level:  Tacrolimus level today reviewed with Dr Quijano who gave orders to increase dose to 5mg BID (from 3mg BID).  Patient was updated with this information and verbalized understanding.    Face to face time: 30 minutes  Discharge Plan    Pt will follow up with [facility] on Thursday (lab dependent)  Discharge instructions reviewed with patient: YES  Patient/Representative verbalized understanding, all questions answered: YES    Discharged from unit at 1030 with whom: friend to home.    Ana Luisa Mcmahon, RN, RN

## 2019-12-23 NOTE — LETTER
[place] CARE COORDINATION  [address]  [facility] [address]    December 23, 2019    Murray Nicholson  [address]  [place] [address]      Dear Murray,    I am a clinic care coordinator who works with Yahir Turcios MD at [facility]. I wanted to introduce myself and provide you with my contact information so that you can call me with questions or concerns about your health care. Below is a description of clinic care coordination and how I can further assist you.     The clinic care coordinator is a registered nurse and/or  who understand the health care system. The goal of clinic care coordination is to help you manage your health and improve access to the [facility] in the most efficient manner. The registered nurse can assist you in meeting your health care goals by providing education, coordinating services, and strengthening the communication among your providers. The  can assist you with financial, behavioral, psychosocial, chemical dependency, counseling, and/or psychiatric resources.    Please feel free to contact me at [phone], with any questions or concerns. We at [facility] are focused on providing you with the highest-quality healthcare experience possible and that all starts with you.     Sincerely,     Kari Tran RN  [facility]-Care Coordination  [facility]  [facility]  [phone]      Enclosed: I have enclosed a copy of a 24 Hour Access Plan. This has helpful phone numbers for you to call when needed. Please keep this in an easy to access place to use as needed.

## 2019-12-24 ENCOUNTER — RESULTS ONLY (OUTPATIENT)
Dept: OTHER | Facility: CLINIC | Age: 64
End: 2019-12-24

## 2019-12-24 DIAGNOSIS — Z94.0 KIDNEY REPLACED BY TRANSPLANT: ICD-10-CM

## 2019-12-24 DIAGNOSIS — Z79.899 LONG TERM USE OF DRUG: ICD-10-CM

## 2019-12-24 PROBLEM — G47.33 OSA ON CPAP: Status: ACTIVE | Noted: 2019-12-24

## 2019-12-24 PROBLEM — N18.5 ANEMIA IN STAGE 5 CHRONIC KIDNEY DISEASE (H): Status: RESOLVED | Noted: 2017-03-17 | Resolved: 2019-12-24

## 2019-12-24 PROBLEM — E83.42 HYPOMAGNESEMIA: Status: ACTIVE | Noted: 2019-12-24

## 2019-12-24 PROBLEM — N25.81 SECONDARY RENAL HYPERPARATHYROIDISM (H): Status: ACTIVE | Noted: 2019-12-24

## 2019-12-24 PROBLEM — Z94.1 HEART TRANSPLANTED (H): Status: RESOLVED | Noted: 2018-06-15 | Resolved: 2019-12-24

## 2019-12-24 PROBLEM — D63.1 ANEMIA IN STAGE 5 CHRONIC KIDNEY DISEASE (H): Status: RESOLVED | Noted: 2017-03-17 | Resolved: 2019-12-24

## 2019-12-24 PROBLEM — D50.9 ANEMIA, IRON DEFICIENCY: Status: RESOLVED | Noted: 2017-02-15 | Resolved: 2019-12-24

## 2019-12-24 PROBLEM — Z48.298 AFTERCARE FOLLOWING ORGAN TRANSPLANT: Status: ACTIVE | Noted: 2019-12-24

## 2019-12-24 PROBLEM — E55.9 VITAMIN D DEFICIENCY: Status: ACTIVE | Noted: 2019-12-24

## 2019-12-24 PROBLEM — I15.1 HTN, KIDNEY TRANSPLANT RELATED: Status: ACTIVE | Noted: 2019-12-24

## 2019-12-24 LAB
ALBUMIN SERPL-MCNC: 3.8 G/DL (ref 3.4–5)
ALP SERPL-CCNC: 278 U/L (ref 40–150)
ALT SERPL W P-5'-P-CCNC: 18 U/L (ref 0–70)
AST SERPL W P-5'-P-CCNC: 15 U/L (ref 0–45)
BILIRUB DIRECT SERPL-MCNC: 0.2 MG/DL (ref 0–0.2)
BILIRUB SERPL-MCNC: 1.1 MG/DL (ref 0.2–1.3)
CA-I BLD-SCNC: 4.2 MG/DL (ref 4.4–5.2)
CHOLEST SERPL-MCNC: 124 MG/DL
CO2 BLDCOV-SCNC: 20 MMOL/L (ref 21–28)
CREAT UR-MCNC: 70 MG/DL
DEPRECATED CALCIDIOL+CALCIFEROL SERPL-MC: 27 UG/L (ref 20–75)
GLUCOSE BLD-MCNC: 114 MG/DL (ref 70–99)
HBA1C MFR BLD: 4.5 % (ref 0–5.6)
HCT VFR BLD CALC: 32 %PCV (ref 40–53)
HDLC SERPL-MCNC: 51 MG/DL
HGB BLD CALC-MCNC: 10.9 G/DL (ref 13.3–17.7)
LDLC SERPL CALC-MCNC: 30 MG/DL
NONHDLC SERPL-MCNC: 73 MG/DL
PCO2 BLDV: 27 MM HG (ref 40–50)
PH BLDV: 7.48 PH (ref 7.32–7.43)
PO2 BLDV: 88 MM HG (ref 25–47)
POTASSIUM BLD-SCNC: 4.6 MMOL/L (ref 3.4–5.3)
PROT SERPL-MCNC: 7.1 G/DL (ref 6.8–8.8)
PROT UR-MCNC: 0.58 G/L
PROT/CREAT 24H UR: 0.82 G/G CR (ref 0–0.2)
PTH-INTACT SERPL-MCNC: 168 PG/ML (ref 18–80)
SAO2 % BLDV FROM PO2: 98 %
SODIUM BLD-SCNC: 135 MMOL/L (ref 133–144)
TRIGL SERPL-MCNC: 218 MG/DL
URATE SERPL-MCNC: 3.9 MG/DL (ref 3.5–7.2)

## 2019-12-24 PROCEDURE — 82306 VITAMIN D 25 HYDROXY: CPT | Performed by: INTERNAL MEDICINE

## 2019-12-24 PROCEDURE — 86832 HLA CLASS I HIGH DEFIN QUAL: CPT | Performed by: INTERNAL MEDICINE

## 2019-12-24 PROCEDURE — 80197 ASSAY OF TACROLIMUS: CPT | Performed by: INTERNAL MEDICINE

## 2019-12-24 PROCEDURE — 86828 HLA CLASS I&II ANTIBODY QUAL: CPT | Mod: XU | Performed by: INTERNAL MEDICINE

## 2019-12-24 PROCEDURE — 83970 ASSAY OF PARATHORMONE: CPT | Performed by: INTERNAL MEDICINE

## 2019-12-24 PROCEDURE — 86833 HLA CLASS II HIGH DEFIN QUAL: CPT | Performed by: INTERNAL MEDICINE

## 2019-12-24 NOTE — TELEPHONE ENCOUNTER
ED / Discharge Outreach Protocol    Patient Contact    Attempt # 2    Was call answered?  No.  Left message on voicemail with information to call me back.      Myraim Clinton RN

## 2019-12-24 NOTE — OP NOTE
Transplant Surgery  Operative Note     Procedure date:  12/19/19    Preoperative diagnosis:  End Stage renal failure due to diabetes mellitus type 2 and hypertension, s/p heart transplant    Postoperative diagnosis:  Same,     1. Right kidney transplant,  Living, Right  iliac fossa, without vascular reconstruction. A J-J ureteral stent was placed.  2. Kidney allograft preparation on Back Table   Faculty/Surgeon:  PAMELA FARIAS    Fellow/Assistant:  Rocky Dwyer, fellow, Paulina Hooks, resident. There was no qualified resident to assist with this procedure.    Anesthesia:  Combined General with Block    Specimen:  None    Drains:  Javier-Guaman drain    Urine output:  100 mls    Estimated blood loss:  100    Fluids administered:       Indication: The patient has End Stage renal failure due to diabetes mellitus type 2  And hypertension in the setting of prior heart transplant and received an organ offer for a Living kidney allograft. After discussing the risks and benefits of proceeding, the patient agreed to proceed with surgery and provided informed consent.  Findings: Integrity of recipient artery: Mild-Moderate atherosclerosis. Large robust lymphatics-  PAULINA drain left with high concern for eventual lymphatic leak. Artery large, deep, and atherosclerotic. URETERAL STENT USED.   Intraoperative Events: None    Final ABO/Crossmatch verification: After the donor organ arrived to the operating room and prior to anastomosis, I participated in the transplant pre-verification upon organ receipt timeout by visually verifying the donor ID, organ and laterality, donor blood type, recipient unique identifier, recipient blood type, and that the donor and recipient are blood type compatible. The crossmatch was done prospectively; the T cell flow crossmatch result was negative and B cell flow crossmatch result was negative prior to anastomosis.  The patient received Thymoglobulin, Cellcept and Solumedrol on  induction.    Donor Organ Information:   Donor UNOS ID:  [id number]    Donor arterial clamp on:  12/19/2019  8:15 AM    Total ischemic time:  678 min    Cold ischemic time:  631 min    Warm ischemic time:  47 min    Preservation fluid:  UW      Back Table Details:   Procedure:  Bench preparation of the kidney allograft for transplantation without vascular reconstruction    Surgeon:  PAMELA FARIAS    Faculty Co-Surgeon:  PAMELA FARIAS    Fellow/Assistant:  Rocky Dwyer, fellow, resident Regan    Donor arrival to recipient room:  12/19/2019  4:59 PM    Graft injury:  No    Graft biopsy:  no    Organ received on:  Ice    Pump resistance:      Pump flow:      Arterial anatomy:  Single    Donor arterial quality:  Normal    Venous anatomy:  Single    Ureteral anatomy:  Single    Any reconstruction:  No    Artery:      Vein:       Complications: None.    Findings: Normal. large robust lymphatics- drain left with concern for lymph leak. artery deep and atherosclerotic. single vessels, anastomosed to external iliacs. URETERAL STENT USED.        None.    Back Table Preparation:  The donor kidney was received and inspected. It had been flushed with UW. The graft was prepared on the back table by removing perinephric fat and ligating venous tributaries and lymphatics. The ureter was also cleaned of excess tissue. If required, reconstruction was performed as detailed above. The kidney was stored in iced cold preservation solution until ready for transplantation. Faculty was present for the critical portions of the procedure.    Operative Procedure:   Arterial anastomosis start:  12/19/2019  6:46 PM    Arterial unclamp:  12/19/2019  7:33 PM    Extra vessels used:   n/a      The patient was brought to the operating room, placed in a supine position, and a time out was performed. Sequential compression devices were placed on both lower extremities and general endotracheal anesthesia was induced.  The  patient was given IV antibiotics and a Dobbins catheter. A central line was placed by Anesthesia service. The abdomen was then shaved, prepped, and draped in the usual sterile fashion.  An incision was made in the right lower quadrant and carried down through the subcutaneous tissue and the abdominal wall fascia. If encountered, the epigastric vessels were ligated in continuity, divided and secured with surgical clips. The right iliac artery and vein were exposed. The retractor system was placed and the lymphatics overlying the vessels were serially ligated and divided.     The patient was heparinized. We applied atraumatic vascular clamps and the donor kidney was brought to the operative field. We made a venotomy and the renal vein was anastomosed to the recipient right External Iliac vein in an end-to-side fashion. An arteriotomy was made and the donor renal artery was anastomosed to the recipient right external iliac artery  in an end to side fashion. The patient was simultaneously loaded with IV mannitol, Lasix and volume. The renal artery was protected and the clamps were removed. After several cardiac cycles, we opened the renal artery and the kidney had Good reperfusion and was firm and pink .    The transplant ureter was managed by creating a Liche (anterior multistitch) anastomosis with absorbable suture. A stent was placed across the anastomosis. The kidney made Yes urine prior to implantation.    Hemostasis was obtained, the anastomoses inspected, and the kidney placed in the iliac fossa. After placement, the vessel lay was inspected and found to be acceptable. The kidney position was Retroperitoneal. The field was irrigated with antibiotic solution. A drain was placed. The retractor was removed and the abdominal wall fascia reapproximated. Subcutaneous tissues were irrigated and hemostasis obtained.  The skin was reapproximated with staples and a dry dressing was applied.   All needle, sponge and  instrument counts were correct x 2. The patient was awakened, extubated, and transferred to PACU for post-op monitoring. Faculty was present for key portions of the procedure.

## 2019-12-25 LAB
TACROLIMUS BLD-MCNC: 5.6 UG/L (ref 5–15)
TME LAST DOSE: 2000 H

## 2019-12-26 ENCOUNTER — INFUSION THERAPY VISIT (OUTPATIENT)
Dept: INFUSION THERAPY | Facility: CLINIC | Age: 64
End: 2019-12-26
Attending: INTERNAL MEDICINE
Payer: MEDICARE

## 2019-12-26 ENCOUNTER — TELEPHONE (OUTPATIENT)
Dept: TRANSPLANT | Facility: CLINIC | Age: 64
End: 2019-12-26

## 2019-12-26 ENCOUNTER — TELEPHONE (OUTPATIENT)
Dept: PHARMACY | Facility: CLINIC | Age: 64
End: 2019-12-26

## 2019-12-26 VITALS
BODY MASS INDEX: 23.48 KG/M2 | TEMPERATURE: 98.1 F | OXYGEN SATURATION: 100 % | RESPIRATION RATE: 18 BRPM | WEIGHT: 157.85 LBS

## 2019-12-26 DIAGNOSIS — I15.1 HTN, KIDNEY TRANSPLANT RELATED: ICD-10-CM

## 2019-12-26 DIAGNOSIS — N18.30 ANEMIA IN STAGE 3 CHRONIC KIDNEY DISEASE (H): ICD-10-CM

## 2019-12-26 DIAGNOSIS — E83.42 HYPOMAGNESEMIA: ICD-10-CM

## 2019-12-26 DIAGNOSIS — Z94.0 HTN, KIDNEY TRANSPLANT RELATED: ICD-10-CM

## 2019-12-26 DIAGNOSIS — D63.1 ANEMIA IN STAGE 3 CHRONIC KIDNEY DISEASE (H): ICD-10-CM

## 2019-12-26 DIAGNOSIS — Z94.0 KIDNEY REPLACED BY TRANSPLANT: Primary | ICD-10-CM

## 2019-12-26 DIAGNOSIS — Z48.298 AFTERCARE FOLLOWING ORGAN TRANSPLANT: ICD-10-CM

## 2019-12-26 DIAGNOSIS — N25.81 SECONDARY RENAL HYPERPARATHYROIDISM (H): ICD-10-CM

## 2019-12-26 DIAGNOSIS — Z94.1 HEART REPLACED BY TRANSPLANT (H): ICD-10-CM

## 2019-12-26 DIAGNOSIS — D84.9 IMMUNOSUPPRESSION (H): ICD-10-CM

## 2019-12-26 DIAGNOSIS — E55.9 VITAMIN D DEFICIENCY: ICD-10-CM

## 2019-12-26 LAB
ACANTHOCYTES BLD QL SMEAR: SLIGHT
ANION GAP SERPL CALCULATED.3IONS-SCNC: 6 MMOL/L (ref 3–14)
BASOPHILS # BLD AUTO: 0 10E9/L (ref 0–0.2)
BASOPHILS NFR BLD AUTO: 0 %
BUN SERPL-MCNC: 20 MG/DL (ref 7–30)
CALCIUM SERPL-MCNC: 8.7 MG/DL (ref 8.5–10.1)
CHLORIDE SERPL-SCNC: 105 MMOL/L (ref 94–109)
CO2 SERPL-SCNC: 22 MMOL/L (ref 20–32)
CREAT SERPL-MCNC: 1.11 MG/DL (ref 0.66–1.25)
DIFFERENTIAL METHOD BLD: ABNORMAL
DONOR IDENTIFICATION: NORMAL
DONOR IDENTIFICATION: NORMAL
DSA COMMENTS: NORMAL
DSA COMMENTS: NORMAL
DSA PRESENT: NO
DSA PRESENT: NO
DSA TEST METHOD: NORMAL
DSA TEST METHOD: NORMAL
EOSINOPHIL # BLD AUTO: 0.2 10E9/L (ref 0–0.7)
EOSINOPHIL NFR BLD AUTO: 6 %
ERYTHROCYTE [DISTWIDTH] IN BLOOD BY AUTOMATED COUNT: 13.9 % (ref 10–15)
GFR SERPL CREATININE-BSD FRML MDRD: 69 ML/MIN/{1.73_M2}
GLUCOSE SERPL-MCNC: 196 MG/DL (ref 70–99)
HCT VFR BLD AUTO: 33.2 % (ref 40–53)
HGB BLD-MCNC: 10.9 G/DL (ref 13.3–17.7)
INTERFERING SUBST TEST METHOD: NORMAL
INTERFERING SUBSTANCE COMMENT: NORMAL
INTERFERING SUBSTANCE RESULT: NORMAL
INTERFERING SUBSTANCE: NORMAL
LYMPHOCYTES # BLD AUTO: 0.4 10E9/L (ref 0.8–5.3)
LYMPHOCYTES NFR BLD AUTO: 11 %
MAGNESIUM SERPL-MCNC: 1.7 MG/DL (ref 1.6–2.3)
MCH RBC QN AUTO: 33.3 PG (ref 26.5–33)
MCHC RBC AUTO-ENTMCNC: 32.8 G/DL (ref 31.5–36.5)
MCV RBC AUTO: 102 FL (ref 78–100)
MONOCYTES # BLD AUTO: 0.3 10E9/L (ref 0–1.3)
MONOCYTES NFR BLD AUTO: 8 %
NEUTROPHILS # BLD AUTO: 2.9 10E9/L (ref 1.6–8.3)
NEUTROPHILS NFR BLD AUTO: 75 %
ORGAN: NORMAL
ORGAN: NORMAL
PHOSPHATE SERPL-MCNC: 3 MG/DL (ref 2.5–4.5)
PLATELET # BLD AUTO: 210 10E9/L (ref 150–450)
PLATELET # BLD EST: ABNORMAL 10*3/UL
POIKILOCYTOSIS BLD QL SMEAR: SLIGHT
POTASSIUM SERPL-SCNC: 4.3 MMOL/L (ref 3.4–5.3)
RBC # BLD AUTO: 3.27 10E12/L (ref 4.4–5.9)
SA1 CELL: NORMAL
SA1 COMMENTS: NORMAL
SA1 HI RISK ABY: NORMAL
SA1 MOD RISK ABY: NORMAL
SA1 TEST METHOD: NORMAL
SA2 CELL: NORMAL
SA2 COMMENTS: NORMAL
SA2 HI RISK ABY UA: NORMAL
SA2 MOD RISK ABY: NORMAL
SA2 TEST METHOD: NORMAL
SODIUM SERPL-SCNC: 133 MMOL/L (ref 133–144)
TACROLIMUS BLD-MCNC: 10 UG/L (ref 5–15)
TME LAST DOSE: NORMAL H
UNACCEPTABLE ANTIGEN: NORMAL
UNOS CPRA: 0
WBC # BLD AUTO: 3.9 10E9/L (ref 4–11)

## 2019-12-26 PROCEDURE — 80197 ASSAY OF TACROLIMUS: CPT | Performed by: INTERNAL MEDICINE

## 2019-12-26 PROCEDURE — 80180 DRUG SCRN QUAN MYCOPHENOLATE: CPT | Performed by: INTERNAL MEDICINE

## 2019-12-26 PROCEDURE — 80048 BASIC METABOLIC PNL TOTAL CA: CPT | Performed by: INTERNAL MEDICINE

## 2019-12-26 PROCEDURE — 84100 ASSAY OF PHOSPHORUS: CPT | Performed by: INTERNAL MEDICINE

## 2019-12-26 PROCEDURE — 83735 ASSAY OF MAGNESIUM: CPT | Performed by: INTERNAL MEDICINE

## 2019-12-26 PROCEDURE — G0463 HOSPITAL OUTPT CLINIC VISIT: HCPCS

## 2019-12-26 PROCEDURE — 85025 COMPLETE CBC W/AUTO DIFF WBC: CPT | Performed by: INTERNAL MEDICINE

## 2019-12-26 RX ORDER — TACROLIMUS 1 MG/1
4 CAPSULE ORAL 2 TIMES DAILY
Qty: 180 CAPSULE | Refills: 11 | COMMUNITY
Start: 2019-12-26 | End: 2020-01-02

## 2019-12-26 NOTE — LETTER
12/26/2019      RE: Murray Nicholson  [address]  [place] [address]       ACUTE TRANSPLANT NEPHROLOGY VISIT    Assessment & Plan   # LDKT: Stable   - Baseline Cr ~ 1.0-1.2, so far   - Proteinuria: Mild (0.5-1.0 grams)   - Date DSA Last Checked: Dec/2019      Latest DSA: No DSA at time of transplant   - BK Viremia: Not checked post transplant   - Kidney Tx Biopsy: No    # Heart Tx: Has been stable with negative cardiac biopsies and no DSA.  Followed by Cardiology.     # Immunosuppression: Tacrolimus immediate release (goal 8-10) and Mycophenolate mofetil (goal 1.0-3.5)   - Changes: No, pending today's drug level.    # Infection Prophylaxis:   - PJP: Sulfa/TMP (Bactrim)  - CMV: Valtrex; changed from Valcyte due to cost issues  - Thrush: None    # Blood Pressure: Controlled;  Goal BP: < 150/90   - Volume status: Euvolemic  EDW ~ 71.5 kg and today's weight is ~ 71.6 kg   - Changes: No, but encouraged patient to push fluids    # Diabetes: Controlled (HbA1c <7%) Last HbA1c: 4.3%   - Management as per primary care.    # Anemia in Chronic Renal Disease: Hgb: Stable      ANASTASIYA: No   - Iron studies: Replete    # Mineral Bone Disorder:   - Secondary renal hyperparathyroidism; PTH level: Significantly elevated (601-1200 pg/ml) and will continue on calcitriol  - Vitamin D; level: Low        On Supplement: Yes  - Calcium; level: Normal        On Supplement: No  - Phosphorus; level: Normal        On Supplement: No    # Electrolytes:   - Potassium; level: Normal        On Supplement: No  - Magnesium; level: Normal        On Supplement: Yes  - Bicarbonate; level: Normal        On Supplement: No    # PAD: Asymptomatic with no claudication symptoms.    # MGUS: Normal kappa/lambda ratio on last check (9/2019) with stable peak of 0.4.   - Recommend rechecking serum kappa/lambda light chain levels at 4 months post transplant.     # SHEELA on CPAP: Patient has been compliant.    # Medical Compliance: Yes    # Transplant  History:  Etiology of Kidney Failure: Diabetes mellitus type 2  Tx: LDKT  Transplant: 12/19/2019 (Kidney), 6/14/2018 (Heart)  Donor Type: Living Donor Class:   Crossmatch at time of Tx: negative  DSA at time of Tx: No  Significant changes in immunosuppression: None  CMV IgG Ab High Risk Discordance (D+/R-): No  EBV IgG Ab High Risk Discordance (D+/R-): No  Significant transplant-related complications: None    Transplant Office Phone Number: [phone]    Assessment and plan was discussed with the patient and he voiced his understanding and agreement.    Return visit: Return for previously scheduled visit.    Giovany Quijano MD    Chief Complaint   Mr. Nicholson is a 64 year old here for hospital follow up after kidney transplant.     History of Present Illness    Mr. Nicholson reports feeling good overall with some medical complaints.  His energy level is improving and pretty good now.  He is active, although less walking due to ongoing incisional pain.  Denies any chest pain or shortness of breath with exertion.  Appetite is better and he was doing better drinking fluids.  No nausea, vomiting or diarrhea and was having bowel movements.  Some incisional pain with movement.  No fever, sweats or chills.  No leg swelling.    Recent Hospitalizations:  [] No [x] Yes S/p LDKT   New Medical Issues: [x] No [] Yes    Decreased energy: [] No [x] Yes Improving   Chest pain or SOB with exertion:  [x] No [] Yes    Appetite change or weight change: [] No [x] Yes Better appetite   Nausea, vomiting or diarrhea:  [x] No [] Yes    Fever, sweats or chills: [x] No [] Yes    Leg swelling: [x] No [] Yes      Home BP: 120/70s    Review of Systems   A comprehensive review of systems was obtained and negative, except as noted in the HPI or PMH.    Problem List   Patient Active Problem List   Diagnosis     Esophageal reflux     Allergic rhinitis     Anemia in chronic renal disease     Dyslipidemia     MGUS (monoclonal gammopathy of  unknown significance)     Ascending aortic aneurysm (H)     Leukopenia     Sigmoid diverticulitis     Heart replaced by transplant (H)     Aortic stenosis     Status post coronary angiogram     Immunosuppression (H)     Type 2 diabetes mellitus (H)     Pancreatic cyst     Kidney replaced by transplant     Aftercare following organ transplant     HTN, kidney transplant related     Secondary renal hyperparathyroidism (H)     Vitamin D deficiency     Hypomagnesemia     SHEELA on CPAP       Social History   Social History     Tobacco Use     Smoking status: Former Smoker     Packs/day: 1.00     Years: 20.00     Pack years: 20.00     Types: Cigarettes     Start date: 1984     Last attempt to quit: 1994     Years since quittin.3     Smokeless tobacco: Never Used   Substance Use Topics     Alcohol use: No     Alcohol/week: 0.0 standard drinks     Drug use: No       Allergies   Allergies   Allergen Reactions     Norco [Hydrocodone-Acetaminophen] Nausea and Vomiting     Cats      Throat tightness     Isosorbide Other (See Comments)     hypotension     Penicillins Hives     Seasonal Allergies      rhinitis     Shrimp      Throat closes        Medications   Current Outpatient Medications   Medication Sig     acetaminophen (TYLENOL) 325 MG tablet Take 2 tablets (650 mg) by mouth every 4 hours as needed for other (multimodal surgical pain management along with NSAIDS and opioid medication as indicated based on pain control and physical function.)     aspirin 81 MG EC tablet Take 81 mg by mouth daily     atorvastatin (LIPITOR) 40 MG tablet Take 1 tablet (40 mg) by mouth daily     blood glucose (ACCU-CHEK GUIDE) test strip Use to test blood sugar 2 times daily or as directed.     blood glucose monitoring (ACCU-CHEK FASTCLIX) lancets Use to test blood sugar 2 times daily or as directed.     calcitRIOL (ROCALTROL) 0.25 MCG capsule Take 1 capsule (0.25 mcg) by mouth daily     fluticasone (FLONASE) 50 MCG/ACT nasal spray  Spray 1 spray into both nostrils daily (Patient taking differently: Spray 1 spray into both nostrils daily as needed )     HYDROmorphone (DILAUDID) 2 MG tablet Take 1 tablet (2 mg) by mouth every 4 hours as needed for moderate to severe pain     insulin glargine (LANTUS PEN) 100 UNIT/ML pen Inject 18 Units Subcutaneous every morning     magnesium oxide (MAG-OX) 400 MG tablet Take 1 tablet (400 mg) by mouth daily (with lunch)     melatonin 1 MG TABS tablet Take 2 tablets (2 mg) by mouth nightly as needed for sleep     mupirocin (BACTROBAN) 2 % external ointment Apply topically 3 times daily     mycophenolate (GENERIC EQUIVALENT) 250 MG capsule Take 3 capsules (750 mg) by mouth 2 times daily     ondansetron (ZOFRAN-ODT) 4 MG ODT tab Take 1 tablet (4 mg) by mouth every 6 hours as needed for nausea or vomiting     pantoprazole (PROTONIX) 40 MG EC tablet Take 40 mg by mouth daily     polyethylene glycol (MIRALAX/GLYCOLAX) packet Take 17 g by mouth daily     senna-docusate (SENOKOT-S/PERICOLACE) 8.6-50 MG tablet Take 2 tablets by mouth 2 times daily     sulfamethoxazole-trimethoprim (BACTRIM/SEPTRA) 400-80 MG tablet Take 1 tablet by mouth daily     tacrolimus (GENERIC EQUIVALENT) 1 MG capsule Take 4 capsules (4 mg) by mouth 2 times daily     valACYclovir (VALTREX) 500 MG tablet Take 3 tablets (1,500 mg) by mouth 2 times daily     Vitamin D3 (CHOLECALCIFEROL) 25 mcg (1000 units) tablet Take 1 tablet (25 mcg) by mouth daily     No current facility-administered medications for this visit.      Facility-Administered Medications Ordered in Other Visits   Medication     diatrizoate meglumine-sodium (GASTROGRAFIN/GASTROVIEW) 66-10 % solution 480 mL     There are no discontinued medications.    Physical Exam   Vital Signs: There were no vitals taken for this visit.    GENERAL APPEARANCE: alert and no distress  HENT: mouth without ulcers or lesions  LYMPHATICS: no cervical or supraclavicular nodes  RESP: lungs clear to  auscultation - no rales, rhonchi or wheezes  CV: regular rhythm, normal rate, no rub, no murmur  EDEMA: no LE edema bilaterally  ABDOMEN: soft, nondistended, nontender, bowel sounds normal  MS: extremities normal - no gross deformities noted, no evidence of inflammation in joints, no muscle tenderness  SKIN: no rash  TX KIDNEY: mild TTP  DIALYSIS ACCESS:  LUE AV Graft with good thrill    Data     Renal Latest Ref Rng & Units 12/26/2019 12/23/2019 12/22/2019   Na 133 - 144 mmol/L 133 137 138   K 3.4 - 5.3 mmol/L 4.3 4.0 3.8   Cl 94 - 109 mmol/L 105 108 110(H)   CO2 20 - 32 mmol/L 22 22 24   BUN 7 - 30 mg/dL 20 20 22   Cr 0.66 - 1.25 mg/dL 1.11 1.01 1.14   Glucose 70 - 99 mg/dL 196(H) 201(H) 78   Ca  8.5 - 10.1 mg/dL 8.7 8.5 8.5   Mg 1.6 - 2.3 mg/dL 1.7 1.8 2.0     Bone Health Latest Ref Rng & Units 12/26/2019 12/24/2019 12/23/2019   Phos 2.5 - 4.5 mg/dL 3.0 - 2.4(L)   PTHi 18 - 80 pg/mL - 168(H) -   Vit D Def 20 - 75 ug/L - 27 -     Heme Latest Ref Rng & Units 12/26/2019 12/23/2019 12/22/2019   WBC 4.0 - 11.0 10e9/L 3.9(L) 3.0(L) 3.5(L)   Hgb 13.3 - 17.7 g/dL 10.9(L) 10.6(L) 10.1(L)   Plt 150 - 450 10e9/L 210 205 185     Liver Latest Ref Rng & Units 12/24/2019 12/10/2019 10/28/2019   AP 40 - 150 U/L 278(H) 372(H) 318(H)   TBili 0.2 - 1.3 mg/dL 1.1 1.0 0.7   DBili 0.0 - 0.2 mg/dL 0.2 - -   ALT 0 - 70 U/L 18 16 14   AST 0 - 45 U/L 15 11 11   Tot Protein 6.8 - 8.8 g/dL 7.1 7.6 7.6   Albumin 3.4 - 5.0 g/dL 3.8 4.0 3.8     Pancreas Latest Ref Rng & Units 12/24/2019 12/20/2019 12/10/2019   A1C 0 - 5.6 % 4.5 4.3 5.2   Amylase 30 - 110 U/L - - -     Iron studies Latest Ref Rng & Units 12/10/2019 6/10/2019 7/10/2018   Iron 35 - 180 ug/dL 84 118 66   Iron sat 15 - 46 % 35 47(H) 28   Ferritin 26 - 388 ng/mL 445(H) 644(H) 771(H)     [facility] Txp Virology Latest Ref Rng & Units 12/10/2019 10/28/2019 6/7/2019   CVM DNA Quant - - EDTA PLASMA Plasma, EDTA anticoagulant   Hep B Core NR:Nonreactive Nonreactive - Nonreactive        Recent  Labs   Lab Test 12/23/19  0735 12/24/19  0839 12/26/19  0720   DOSTAC Not Provided 2,000 Not Provided   TACROL 5.5 5.6 10.0     Recent Labs   Lab Test 12/12/18  0612 12/26/19  0720   DOSMPA Not Provided Not Provided   MPACID 4.23* 1.65   MPAG 144.3* 58.9       Giovany Quijano MD

## 2019-12-26 NOTE — PATIENT INSTRUCTIONS
RN will call you later if there are any changes to Tacrolimus dose  Homecare will call you later today with time for tomorrow for labs.  Contact coordinator with any questions/concerns    Dear Murray Nicholson    Thank you for choosing [facility] for your transplant cares.  The following information is a summary of our appointment as well as important reminders.      Please make sure your phone is available today because I will call to update you with your anti-rejection drug levels and possibly make changes to your anti-rejection dosages.    We look forward in seeing you on your next appointment here at [facility]  Please don t hesitate to call us at [phone] to reschedule any of your appointments or to speak with one of the Psych[facility] registered nurses.  It was a pleasure taking care of you today.    Sincerely,    [facility]  [facility]  [address] [place] [address]  [id number]  Phone:  [phone]

## 2019-12-26 NOTE — TELEPHONE ENCOUNTER
RNCC spoke with Waqas at [facility] - Murray was d/c'd from [facility] today. Okay for start of care, with lab draws due on 12/30/19.

## 2019-12-26 NOTE — TELEPHONE ENCOUNTER
Provider Call: Home Care  Route to LPN    Reason for Call: Please connect with monika regarding HC start   Callback needed? Yes    Return Call Needed  Same as documented in contacts section  When to return call?: Greater than one day: Route standard priority

## 2019-12-26 NOTE — TELEPHONE ENCOUNTER
Pt called stating he does not want to use Lantus because he would prefer not to inject in his stomach due to discomfort. Recommended he start using Lantus in out part of thighs or upper arms. Pt agreed.    Eduardo Lea, PharmD  [facility] Pharmacist    Phone: [phone]

## 2019-12-27 LAB
MYCOPHENOLATE SERPL LC/MS/MS-MCNC: 1.65 MG/L (ref 1–3.5)
MYCOPHENOLATE-G SERPL LC/MS/MS-MCNC: 58.9 MG/L (ref 30–95)
TME LAST DOSE: NORMAL H

## 2019-12-28 NOTE — PROGRESS NOTES
ACUTE TRANSPLANT NEPHROLOGY VISIT    Assessment & Plan   # LDKT: Stable   - Baseline Cr ~ 1.0-1.2, so far   - Proteinuria: Mild (0.5-1.0 grams)   - Date DSA Last Checked: Dec/2019      Latest DSA: No DSA at time of transplant   - BK Viremia: Not checked post transplant   - Kidney Tx Biopsy: No    # Heart Tx: Has been stable with negative cardiac biopsies and no DSA.  Followed by Cardiology.     # Immunosuppression: Tacrolimus immediate release (goal 8-10) and Mycophenolate mofetil (goal 1.0-3.5)   - Changes: No, pending today's drug level.    # Infection Prophylaxis:   - PJP: Sulfa/TMP (Bactrim)  - CMV: Valtrex; changed from Valcyte due to cost issues  - Thrush: None    # Blood Pressure: Controlled;  Goal BP: < 150/90   - Volume status: Euvolemic  EDW ~ 71.5 kg and today's weight is ~ 71.6 kg   - Changes: No, but encouraged patient to push fluids    # Diabetes: Controlled (HbA1c <7%) Last HbA1c: 4.3%   - Management as per primary care.    # Anemia in Chronic Renal Disease: Hgb: Stable      ANASTASIYA: No   - Iron studies: Replete    # Mineral Bone Disorder:   - Secondary renal hyperparathyroidism; PTH level: Significantly elevated (601-1200 pg/ml) and will continue on calcitriol  - Vitamin D; level: Low        On Supplement: Yes  - Calcium; level: Normal        On Supplement: No  - Phosphorus; level: Normal        On Supplement: No    # Electrolytes:   - Potassium; level: Normal        On Supplement: No  - Magnesium; level: Normal        On Supplement: Yes  - Bicarbonate; level: Normal        On Supplement: No    # PAD: Asymptomatic with no claudication symptoms.    # MGUS: Normal kappa/lambda ratio on last check (9/2019) with stable peak of 0.4.   - Recommend rechecking serum kappa/lambda light chain levels at 4 months post transplant.     # SHEELA on CPAP: Patient has been compliant.    # Medical Compliance: Yes    # Transplant History:  Etiology of Kidney Failure: Diabetes mellitus type 2  Tx: LDKT  Transplant: 12/19/2019  (Kidney), 6/14/2018 (Heart)  Donor Type: Living Donor Class:   Crossmatch at time of Tx: negative  DSA at time of Tx: No  Significant changes in immunosuppression: None  CMV IgG Ab High Risk Discordance (D+/R-): No  EBV IgG Ab High Risk Discordance (D+/R-): No  Significant transplant-related complications: None    Transplant Office Phone Number: [phone]    Assessment and plan was discussed with the patient and he voiced his understanding and agreement.    Return visit: Return for previously scheduled visit.    Giovany Quijano MD    Chief Complaint   Mr. Nicholson is a 64 year old here for hospital follow up after kidney transplant.     History of Present Illness    Mr. Nicholson reports feeling good overall with some medical complaints.  His energy level is improving and pretty good now.  He is active, although less walking due to ongoing incisional pain.  Denies any chest pain or shortness of breath with exertion.  Appetite is better and he was doing better drinking fluids.  No nausea, vomiting or diarrhea and was having bowel movements.  Some incisional pain with movement.  No fever, sweats or chills.  No leg swelling.    Recent Hospitalizations:  [] No [x] Yes S/p LDKT   New Medical Issues: [x] No [] Yes    Decreased energy: [] No [x] Yes Improving   Chest pain or SOB with exertion:  [x] No [] Yes    Appetite change or weight change: [] No [x] Yes Better appetite   Nausea, vomiting or diarrhea:  [x] No [] Yes    Fever, sweats or chills: [x] No [] Yes    Leg swelling: [x] No [] Yes      Home BP: 120/70s    Review of Systems   A comprehensive review of systems was obtained and negative, except as noted in the HPI or PMH.    Problem List   Patient Active Problem List   Diagnosis     Esophageal reflux     Allergic rhinitis     Anemia in chronic renal disease     Dyslipidemia     MGUS (monoclonal gammopathy of unknown significance)     Ascending aortic aneurysm (H)     Leukopenia     Sigmoid diverticulitis      Heart replaced by transplant (H)     Aortic stenosis     Status post coronary angiogram     Immunosuppression (H)     Type 2 diabetes mellitus (H)     Pancreatic cyst     Kidney replaced by transplant     Aftercare following organ transplant     HTN, kidney transplant related     Secondary renal hyperparathyroidism (H)     Vitamin D deficiency     Hypomagnesemia     SHEELA on CPAP       Social History   Social History     Tobacco Use     Smoking status: Former Smoker     Packs/day: 1.00     Years: 20.00     Pack years: 20.00     Types: Cigarettes     Start date: 1984     Last attempt to quit: 1994     Years since quittin.3     Smokeless tobacco: Never Used   Substance Use Topics     Alcohol use: No     Alcohol/week: 0.0 standard drinks     Drug use: No       Allergies   Allergies   Allergen Reactions     Norco [Hydrocodone-Acetaminophen] Nausea and Vomiting     Cats      Throat tightness     Isosorbide Other (See Comments)     hypotension     Penicillins Hives     Seasonal Allergies      rhinitis     Shrimp      Throat closes        Medications   Current Outpatient Medications   Medication Sig     acetaminophen (TYLENOL) 325 MG tablet Take 2 tablets (650 mg) by mouth every 4 hours as needed for other (multimodal surgical pain management along with NSAIDS and opioid medication as indicated based on pain control and physical function.)     aspirin 81 MG EC tablet Take 81 mg by mouth daily     atorvastatin (LIPITOR) 40 MG tablet Take 1 tablet (40 mg) by mouth daily     blood glucose (ACCU-CHEK GUIDE) test strip Use to test blood sugar 2 times daily or as directed.     blood glucose monitoring (ACCU-CHEK FASTCLIX) lancets Use to test blood sugar 2 times daily or as directed.     calcitRIOL (ROCALTROL) 0.25 MCG capsule Take 1 capsule (0.25 mcg) by mouth daily     fluticasone (FLONASE) 50 MCG/ACT nasal spray Spray 1 spray into both nostrils daily (Patient taking differently: Spray 1 spray into both nostrils  daily as needed )     HYDROmorphone (DILAUDID) 2 MG tablet Take 1 tablet (2 mg) by mouth every 4 hours as needed for moderate to severe pain     insulin glargine (LANTUS PEN) 100 UNIT/ML pen Inject 18 Units Subcutaneous every morning     magnesium oxide (MAG-OX) 400 MG tablet Take 1 tablet (400 mg) by mouth daily (with lunch)     melatonin 1 MG TABS tablet Take 2 tablets (2 mg) by mouth nightly as needed for sleep     mupirocin (BACTROBAN) 2 % external ointment Apply topically 3 times daily     mycophenolate (GENERIC EQUIVALENT) 250 MG capsule Take 3 capsules (750 mg) by mouth 2 times daily     ondansetron (ZOFRAN-ODT) 4 MG ODT tab Take 1 tablet (4 mg) by mouth every 6 hours as needed for nausea or vomiting     pantoprazole (PROTONIX) 40 MG EC tablet Take 40 mg by mouth daily     polyethylene glycol (MIRALAX/GLYCOLAX) packet Take 17 g by mouth daily     senna-docusate (SENOKOT-S/PERICOLACE) 8.6-50 MG tablet Take 2 tablets by mouth 2 times daily     sulfamethoxazole-trimethoprim (BACTRIM/SEPTRA) 400-80 MG tablet Take 1 tablet by mouth daily     tacrolimus (GENERIC EQUIVALENT) 1 MG capsule Take 4 capsules (4 mg) by mouth 2 times daily     valACYclovir (VALTREX) 500 MG tablet Take 3 tablets (1,500 mg) by mouth 2 times daily     Vitamin D3 (CHOLECALCIFEROL) 25 mcg (1000 units) tablet Take 1 tablet (25 mcg) by mouth daily     No current facility-administered medications for this visit.      Facility-Administered Medications Ordered in Other Visits   Medication     diatrizoate meglumine-sodium (GASTROGRAFIN/GASTROVIEW) 66-10 % solution 480 mL     There are no discontinued medications.    Physical Exam   Vital Signs: There were no vitals taken for this visit.    GENERAL APPEARANCE: alert and no distress  HENT: mouth without ulcers or lesions  LYMPHATICS: no cervical or supraclavicular nodes  RESP: lungs clear to auscultation - no rales, rhonchi or wheezes  CV: regular rhythm, normal rate, no rub, no murmur  EDEMA: no LE  edema bilaterally  ABDOMEN: soft, nondistended, nontender, bowel sounds normal  MS: extremities normal - no gross deformities noted, no evidence of inflammation in joints, no muscle tenderness  SKIN: no rash  TX KIDNEY: mild TTP  DIALYSIS ACCESS:  LUE AV Graft with good thrill    Data     Renal Latest Ref Rng & Units 12/26/2019 12/23/2019 12/22/2019   Na 133 - 144 mmol/L 133 137 138   K 3.4 - 5.3 mmol/L 4.3 4.0 3.8   Cl 94 - 109 mmol/L 105 108 110(H)   CO2 20 - 32 mmol/L 22 22 24   BUN 7 - 30 mg/dL 20 20 22   Cr 0.66 - 1.25 mg/dL 1.11 1.01 1.14   Glucose 70 - 99 mg/dL 196(H) 201(H) 78   Ca  8.5 - 10.1 mg/dL 8.7 8.5 8.5   Mg 1.6 - 2.3 mg/dL 1.7 1.8 2.0     Bone Health Latest Ref Rng & Units 12/26/2019 12/24/2019 12/23/2019   Phos 2.5 - 4.5 mg/dL 3.0 - 2.4(L)   PTHi 18 - 80 pg/mL - 168(H) -   Vit D Def 20 - 75 ug/L - 27 -     Heme Latest Ref Rng & Units 12/26/2019 12/23/2019 12/22/2019   WBC 4.0 - 11.0 10e9/L 3.9(L) 3.0(L) 3.5(L)   Hgb 13.3 - 17.7 g/dL 10.9(L) 10.6(L) 10.1(L)   Plt 150 - 450 10e9/L 210 205 185     Liver Latest Ref Rng & Units 12/24/2019 12/10/2019 10/28/2019   AP 40 - 150 U/L 278(H) 372(H) 318(H)   TBili 0.2 - 1.3 mg/dL 1.1 1.0 0.7   DBili 0.0 - 0.2 mg/dL 0.2 - -   ALT 0 - 70 U/L 18 16 14   AST 0 - 45 U/L 15 11 11   Tot Protein 6.8 - 8.8 g/dL 7.1 7.6 7.6   Albumin 3.4 - 5.0 g/dL 3.8 4.0 3.8     Pancreas Latest Ref Rng & Units 12/24/2019 12/20/2019 12/10/2019   A1C 0 - 5.6 % 4.5 4.3 5.2   Amylase 30 - 110 U/L - - -     Iron studies Latest Ref Rng & Units 12/10/2019 6/10/2019 7/10/2018   Iron 35 - 180 ug/dL 84 118 66   Iron sat 15 - 46 % 35 47(H) 28   Ferritin 26 - 388 ng/mL 445(H) 644(H) 771(H)     [facility] Txp Virology Latest Ref Rng & Units 12/10/2019 10/28/2019 6/7/2019   CVM DNA Quant - - EDTA PLASMA Plasma, EDTA anticoagulant   Hep B Core NR:Nonreactive Nonreactive - Nonreactive        Recent Labs   Lab Test 12/23/19  0735 12/24/19  0839 12/26/19  0720   DOSTAC Not Provided 2,000 Not Provided   TACROL  5.5 5.6 10.0     Recent Labs   Lab Test 12/12/18  0612 12/26/19  0720   DOSMPA Not Provided Not Provided   MPACID 4.23* 1.65   MPAG 144.3* 58.9

## 2019-12-30 ENCOUNTER — HOME CARE/HOSPICE - HEALTHEAST (OUTPATIENT)
Dept: HOME HEALTH SERVICES | Facility: HOME HEALTH | Age: 64
End: 2019-12-30

## 2019-12-30 ENCOUNTER — TELEPHONE (OUTPATIENT)
Dept: TRANSPLANT | Facility: CLINIC | Age: 64
End: 2019-12-30

## 2019-12-30 NOTE — TELEPHONE ENCOUNTER
Provider Call: General  Route to LPN    Reason for call: Call from N- they were going to see pt and start assessment  They will not be seeing pt  He is not home bound is on Medicare  He will get as at clinic tomorrow     Call back needed? Yes    Return Call Needed  Same as documented in contacts section  When to return call?: Same day: Route High Priority

## 2019-12-30 NOTE — TELEPHONE ENCOUNTER
Spoke to patient who states that he will go to [facility] for lab draws.  Patient confirms that he has the phone number to call to set up lab appt.  Patient will call back if he needs further assistance setting up lab appt.

## 2019-12-30 NOTE — TELEPHONE ENCOUNTER
Home Care called again  She needs a call back today.  I made an 830 lab appointment tomorrow just incase   Pt will need to be informed by Home Care

## 2019-12-31 ENCOUNTER — ALLIED HEALTH/NURSE VISIT (OUTPATIENT)
Dept: PHARMACY | Facility: CLINIC | Age: 64
End: 2019-12-31
Payer: COMMERCIAL

## 2019-12-31 DIAGNOSIS — Z79.899 LONG TERM USE OF DRUG: ICD-10-CM

## 2019-12-31 DIAGNOSIS — E11.22 TYPE 2 DIABETES MELLITUS WITH STAGE 5 CHRONIC KIDNEY DISEASE NOT ON CHRONIC DIALYSIS, WITHOUT LONG-TERM CURRENT USE OF INSULIN (H): ICD-10-CM

## 2019-12-31 DIAGNOSIS — Z94.0 KIDNEY REPLACED BY TRANSPLANT: ICD-10-CM

## 2019-12-31 DIAGNOSIS — N18.5 TYPE 2 DIABETES MELLITUS WITH STAGE 5 CHRONIC KIDNEY DISEASE NOT ON CHRONIC DIALYSIS, WITHOUT LONG-TERM CURRENT USE OF INSULIN (H): ICD-10-CM

## 2019-12-31 DIAGNOSIS — Z94.0 KIDNEY REPLACED BY TRANSPLANT: Primary | ICD-10-CM

## 2019-12-31 DIAGNOSIS — Z94.1 HEART TRANSPLANTED (H): ICD-10-CM

## 2019-12-31 LAB
ERYTHROCYTE [DISTWIDTH] IN BLOOD BY AUTOMATED COUNT: 13.5 % (ref 10–15)
HCT VFR BLD AUTO: 33 % (ref 40–53)
HGB BLD-MCNC: 10.8 G/DL (ref 13.3–17.7)
MCH RBC QN AUTO: 33.5 PG (ref 26.5–33)
MCHC RBC AUTO-ENTMCNC: 32.7 G/DL (ref 31.5–36.5)
MCV RBC AUTO: 103 FL (ref 78–100)
PLATELET # BLD AUTO: 208 10E9/L (ref 150–450)
RBC # BLD AUTO: 3.22 10E12/L (ref 4.4–5.9)
WBC # BLD AUTO: 2.9 10E9/L (ref 4–11)

## 2019-12-31 PROCEDURE — 99607 MTMS BY PHARM ADDL 15 MIN: CPT | Performed by: PHARMACIST

## 2019-12-31 PROCEDURE — 99606 MTMS BY PHARM EST 15 MIN: CPT | Performed by: PHARMACIST

## 2019-12-31 PROCEDURE — 36415 COLL VENOUS BLD VENIPUNCTURE: CPT | Performed by: INTERNAL MEDICINE

## 2019-12-31 PROCEDURE — 80048 BASIC METABOLIC PNL TOTAL CA: CPT | Performed by: INTERNAL MEDICINE

## 2019-12-31 PROCEDURE — 80197 ASSAY OF TACROLIMUS: CPT | Performed by: INTERNAL MEDICINE

## 2019-12-31 PROCEDURE — 85027 COMPLETE CBC AUTOMATED: CPT | Performed by: INTERNAL MEDICINE

## 2019-12-31 NOTE — PATIENT INSTRUCTIONS
Recommendations from today's MTM visit:                                                      1. Hold Lantus.    2. Start checking blood sugars twice daily.     It was great to speak with you today.  I value your experience and would be very thankful for your time with providing feedback on our clinic survey. You may receive a survey via email or text message in the next few days.     Next MTM visit: 1/10/20 at 9:30am over the phone    To schedule another MTM appointment, please call the clinic directly or you may call the MTM scheduling line at [phone] or toll-free at [phone].     My Clinical Pharmacist's contact information:                                                      It was a pleasure talking with you today!  Please feel free to contact me with any questions or concerns you have.      Eduardo Lea, PharmD  [facility] Pharmacist    Phone: [phone]

## 2019-12-31 NOTE — PROGRESS NOTES
SUBJECTIVE/OBJECTIVE:                Murray Nicholson is a 64 year old male called for a Follow-up MTM visit.  He was referred post txp.     Chief Complaint: follow-up to discuss blood sugars.     Allergies/ADRs: Reviewed in Epic  Tobacco:  reports that he quit smoking about 25 years ago. His smoking use included cigarettes. He started smoking about 36 years ago. He has a 20.00 pack-year smoking history. He has never used smokeless tobacco.  Alcohol: not currently using  Caffeine: 0-1 cups/day of coffee, 1 cups/day of tea  PMH: Reviewed in Epic    Medication Adherence/Access:  Patient uses pill box(es).  Patient takes medications 4 time(s) per day. 7am, 1-2, 4-6, 7-8:30pm  Per patient, misses medication 0 times per week.   Medication barriers: none. Is concerned about cost of insulin, Prescribed Valcyte which cost $800 dollars. Is taking Valacyclovir instead.   The patient fills medications at [facility]: NO, fills medications at [facility].    Renal/ post heart Transplant:  Current immunosuppressants include Tacrolimus 4mg BID (0-6 months post tx, goal 8-10) and MMF 750mg BID (goal 0-6 month post tx: 1-3.5).  Pt reports no side effects  Transplant date: 12/19/19 renal, heart txp in 6/14/2018  Estimated Creatinine Clearance: 68.1 mL/min (based on SCr of 1.11 mg/dL).  CMV prophylaxis: Valacyclovir 1500mg BID. For 3 months post transplant.   PCP prophylaxis: Bactrim S S daily  PPI use: Pantoprazole 40mg daily- GP took him down to one daily, denies heartburn.   Current supplements for electrolyte replacement: Mag Oxide 400mg daily ( 2 hours from MMF) .  Magnesium   Date Value Ref Range Status   12/26/2019 1.7 1.6 - 2.3 mg/dL Final   Tx Coordinator: Jennifer Villarreal Tx MD: Dr. Quijano, Using Med Card: Yes  Immunizations: annual flu shot 2019; Lflftncpyp03:  2005, 2011; Prevnar 13: 2015; TDaP:  2013; Shingrix: Zostavax 2015    Diabetes:  Pt currently taking no insulin, Lantus listed on his med list, but he was not  discharged with this. He brings in old Novolog and Humulin NPH pens today wondering if he should use these.  Pt is not experiencing side effects.  SMBG: two times daily.   Ranges (patient reported): 110 this morning (lab was 201)  Date FBG/ 2hours post Dinner /2hours post   12/23 (not taking Lantus) 110 112   12/24  115 124   12/25  120    12/26 123    12/27 (started Lantus 18 units) 144    12/28 18 U 128    12/29 18 U 107    12/30 10 U 95    12/31 10 U 103    Patient is not experiencing hypoglycemia  Recent symptoms of high blood sugar? none    Today's Vitals: There were no vitals taken for this visit.    ASSESSMENT:                 Medication Adherence: fair, no issues today.    Renal/ post heart Transplant:  Discussed stopping Pantoprazole today, pt would like to hold off for now.     Diabetes: Pt's BG were fairly well controlled when he was off Lantus ( FBG). Will have him hold and see where they end up next week. Pt to start checking BG twice daily as well.     PLAN:                Pt to...  1. Hold Lantus for now.   2. Start checking BG BID.     I spent 20 minutes with this patient today. I offer these suggestions for consideration by txp team. A copy of the visit note was provided to the patient's referring provider.    Will follow up in 1 week.    The patient was given a summary of these recommendations as an after visit summary.    Eduardo Lea, PharmD  [facility] Pharmacist    Phone: [phone]

## 2020-01-01 LAB
ANION GAP SERPL CALCULATED.3IONS-SCNC: 6 MMOL/L (ref 3–14)
BUN SERPL-MCNC: 26 MG/DL (ref 7–30)
CALCIUM SERPL-MCNC: 8.9 MG/DL (ref 8.5–10.1)
CHLORIDE SERPL-SCNC: 108 MMOL/L (ref 94–109)
CO2 SERPL-SCNC: 19 MMOL/L (ref 20–32)
CREAT SERPL-MCNC: 1.3 MG/DL (ref 0.66–1.25)
GFR SERPL CREATININE-BSD FRML MDRD: 57 ML/MIN/{1.73_M2}
GLUCOSE SERPL-MCNC: 156 MG/DL (ref 70–99)
POTASSIUM SERPL-SCNC: 4.7 MMOL/L (ref 3.4–5.3)
SODIUM SERPL-SCNC: 133 MMOL/L (ref 133–144)
TACROLIMUS BLD-MCNC: 11 UG/L (ref 5–15)
TME LAST DOSE: NORMAL H

## 2020-01-02 ENCOUNTER — TELEPHONE (OUTPATIENT)
Dept: TRANSPLANT | Facility: CLINIC | Age: 65
End: 2020-01-02

## 2020-01-02 DIAGNOSIS — Z94.0 KIDNEY REPLACED BY TRANSPLANT: Primary | ICD-10-CM

## 2020-01-02 RX ORDER — TACROLIMUS 1 MG/1
3 CAPSULE ORAL 2 TIMES DAILY
Qty: 180 CAPSULE | Refills: 11 | Status: SHIPPED | OUTPATIENT
Start: 2020-01-02 | End: 2020-01-07

## 2020-01-02 RX ORDER — TACROLIMUS 0.5 MG/1
0.5 CAPSULE ORAL 2 TIMES DAILY
Qty: 60 CAPSULE | Refills: 11 | Status: SHIPPED | OUTPATIENT
Start: 2020-01-02 | End: 2020-01-07

## 2020-01-02 NOTE — TELEPHONE ENCOUNTER
Post Kidney and Pancreas Transplant Team Conference  Date: 1/2/2020  Transplant Coordinator: Lillie Ulloa, Cami Villarreal     Attendees:  [x]  Dr. Quijano  [x] Patt Johns LPN     []  Dr. Ulloa [x] Edith Ward, TONY [] Donna Wheat LPN   []  Dr. Escobar [] Marielena Romero, RN     [] Radha Gray RN [] Eduardo Lea, PharmD   [] Dr. Ayala [] Jennifer Villareral RN    [] Dr. Cheney [] Fantasma Dorsey RN    [] Dr. Chase [] Autumn Mendez, TONY    [x] Dr. Cowan [] Kari Carr RN     [] Anastasia Tanner RN    [] Surgery Fellow [x] Alma Rosa Monique RN    [] Maliha Jesus, NP [] Scarlet Haines RN        Verbal Plan Read Back:   Creatinine great than 1.3, biopsy needed    Routed to RN Coordinator   Patt Johns LPN    See Epic note 1/2/2020. Repeat labs scheduled for 1/3/2020.

## 2020-01-02 NOTE — TELEPHONE ENCOUNTER
Murray called to request lab appt to correspond with 1/6 appt.  appt made for 0745.    With rise in creatinine from 1.1 to 1.3: tacro now at / above goal 8-10.  tacro was reduced from 5mg BID to 4mg BID on 12/26, with tacro level increasing after dose reduction.      Labs to be drawn again tomorrow at [place].    Denies any s/s of UTI (excpet for frequency) - will check UA to ensure no UTI.      Denies any fevers.  Weight is down about 2-3kg since time of transplant.   Denies any swelling.    SBP 130s consistently, one time low of 111.  HR 80s.  Drinking about 60oz daily, per his report.  RNCC advised to increase oral hydration.    ISSUE:   Tacrolimus IR level 11 on 12/31, goal 8-10, dose 4 mg BID.    PLAN:   Please call patient and confirm this was an accurate 12-hour trough. Verify Tacrolimus IR dose 4 mg BID. Confirm no new medications or illness. Confirm no missed doses. If accurate trough and accurate dose, decrease Tacrolimus IR dose to 3.5 mg BID and repeat labs as scheduled.    OUTCOME:   Spoke with patient, they confirm accurate trough level and current dose 4 mg BID. Patient confirmed dose change to 3.5 mg BID and to repeat labs as scheduled. Orders sent to preferred pharmacy for dose change. Patient voiced understanding of plan.

## 2020-01-03 ENCOUNTER — TELEPHONE (OUTPATIENT)
Dept: TRANSPLANT | Facility: CLINIC | Age: 65
End: 2020-01-03

## 2020-01-03 DIAGNOSIS — Z94.0 KIDNEY REPLACED BY TRANSPLANT: ICD-10-CM

## 2020-01-03 DIAGNOSIS — Z79.899 LONG TERM USE OF DRUG: ICD-10-CM

## 2020-01-03 LAB
ANION GAP SERPL CALCULATED.3IONS-SCNC: 9 MMOL/L (ref 3–14)
BUN SERPL-MCNC: 28 MG/DL (ref 7–30)
CALCIUM SERPL-MCNC: 8.7 MG/DL (ref 8.5–10.1)
CHLORIDE SERPL-SCNC: 107 MMOL/L (ref 94–109)
CO2 SERPL-SCNC: 20 MMOL/L (ref 20–32)
CREAT SERPL-MCNC: 1.46 MG/DL (ref 0.66–1.25)
ERYTHROCYTE [DISTWIDTH] IN BLOOD BY AUTOMATED COUNT: 13.1 % (ref 10–15)
GFR SERPL CREATININE-BSD FRML MDRD: 50 ML/MIN/{1.73_M2}
GLUCOSE SERPL-MCNC: 166 MG/DL (ref 70–99)
HCT VFR BLD AUTO: 32.9 % (ref 40–53)
HGB BLD-MCNC: 11 G/DL (ref 13.3–17.7)
MCH RBC QN AUTO: 34 PG (ref 26.5–33)
MCHC RBC AUTO-ENTMCNC: 33.4 G/DL (ref 31.5–36.5)
MCV RBC AUTO: 102 FL (ref 78–100)
PLATELET # BLD AUTO: 225 10E9/L (ref 150–450)
POTASSIUM SERPL-SCNC: 4.6 MMOL/L (ref 3.4–5.3)
RBC # BLD AUTO: 3.24 10E12/L (ref 4.4–5.9)
SODIUM SERPL-SCNC: 136 MMOL/L (ref 133–144)
TACROLIMUS BLD-MCNC: 15.4 UG/L (ref 5–15)
TME LAST DOSE: 20.15 H
WBC # BLD AUTO: 2.5 10E9/L (ref 4–11)

## 2020-01-03 PROCEDURE — 80197 ASSAY OF TACROLIMUS: CPT | Performed by: INTERNAL MEDICINE

## 2020-01-03 PROCEDURE — 80048 BASIC METABOLIC PNL TOTAL CA: CPT | Performed by: INTERNAL MEDICINE

## 2020-01-03 PROCEDURE — 36415 COLL VENOUS BLD VENIPUNCTURE: CPT | Performed by: INTERNAL MEDICINE

## 2020-01-03 PROCEDURE — 85027 COMPLETE CBC AUTOMATED: CPT | Performed by: INTERNAL MEDICINE

## 2020-01-03 NOTE — TELEPHONE ENCOUNTER
Yesterday drank 72oz,   Today so far has had 54oz.    Weight today 68.2kg, up from yesterday.  Continues to deny s/s of UTI.      RNCC advised to continue as is, will repeat labs next week, as tacro is still pending.    RNCC did review this with Dr. Ulloa who was in agreement.

## 2020-01-03 NOTE — TELEPHONE ENCOUNTER
Pt is a return pt of the fellows, last seen in 2018.  Scheduled to see Dr. Clemons on 1/17/2020.    Charis Ellis RN  [facility]

## 2020-01-06 ENCOUNTER — TELEPHONE (OUTPATIENT)
Dept: TRANSPLANT | Facility: CLINIC | Age: 65
End: 2020-01-06

## 2020-01-06 ENCOUNTER — OFFICE VISIT (OUTPATIENT)
Dept: TRANSPLANT | Facility: CLINIC | Age: 65
End: 2020-01-06
Attending: SURGERY
Payer: MEDICARE

## 2020-01-06 VITALS
BODY MASS INDEX: 22.74 KG/M2 | SYSTOLIC BLOOD PRESSURE: 136 MMHG | HEART RATE: 90 BPM | OXYGEN SATURATION: 100 % | DIASTOLIC BLOOD PRESSURE: 79 MMHG | HEIGHT: 69 IN | WEIGHT: 153.5 LBS

## 2020-01-06 DIAGNOSIS — Z79.899 LONG TERM USE OF DRUG: ICD-10-CM

## 2020-01-06 DIAGNOSIS — Z94.0 KIDNEY REPLACED BY TRANSPLANT: Primary | ICD-10-CM

## 2020-01-06 DIAGNOSIS — Z94.0 KIDNEY REPLACED BY TRANSPLANT: ICD-10-CM

## 2020-01-06 LAB
ALBUMIN UR-MCNC: NEGATIVE MG/DL
ANION GAP SERPL CALCULATED.3IONS-SCNC: 8 MMOL/L (ref 3–14)
APPEARANCE UR: ABNORMAL
BACTERIA #/AREA URNS HPF: ABNORMAL /HPF
BILIRUB UR QL STRIP: NEGATIVE
BUN SERPL-MCNC: 24 MG/DL (ref 7–30)
CALCIUM SERPL-MCNC: 9.3 MG/DL (ref 8.5–10.1)
CHLORIDE SERPL-SCNC: 110 MMOL/L (ref 94–109)
CO2 SERPL-SCNC: 20 MMOL/L (ref 20–32)
COLOR UR AUTO: YELLOW
CREAT SERPL-MCNC: 1.16 MG/DL (ref 0.66–1.25)
ERYTHROCYTE [DISTWIDTH] IN BLOOD BY AUTOMATED COUNT: 13.7 % (ref 10–15)
GFR SERPL CREATININE-BSD FRML MDRD: 66 ML/MIN/{1.73_M2}
GLUCOSE SERPL-MCNC: 173 MG/DL (ref 70–99)
GLUCOSE UR STRIP-MCNC: NEGATIVE MG/DL
HCT VFR BLD AUTO: 34.4 % (ref 40–53)
HGB BLD-MCNC: 11.5 G/DL (ref 13.3–17.7)
HGB UR QL STRIP: ABNORMAL
KETONES UR STRIP-MCNC: NEGATIVE MG/DL
LEUKOCYTE ESTERASE UR QL STRIP: NEGATIVE
MAGNESIUM SERPL-MCNC: 1.7 MG/DL (ref 1.6–2.3)
MCH RBC QN AUTO: 33.6 PG (ref 26.5–33)
MCHC RBC AUTO-ENTMCNC: 33.4 G/DL (ref 31.5–36.5)
MCV RBC AUTO: 101 FL (ref 78–100)
MUCOUS THREADS #/AREA URNS LPF: PRESENT /LPF
NITRATE UR QL: NEGATIVE
PH UR STRIP: 5 PH (ref 5–7)
PHOSPHATE SERPL-MCNC: 3.9 MG/DL (ref 2.5–4.5)
PLATELET # BLD AUTO: 232 10E9/L (ref 150–450)
POTASSIUM SERPL-SCNC: 4.5 MMOL/L (ref 3.4–5.3)
RBC # BLD AUTO: 3.42 10E12/L (ref 4.4–5.9)
RBC #/AREA URNS AUTO: 2 /HPF (ref 0–2)
SODIUM SERPL-SCNC: 138 MMOL/L (ref 133–144)
SOURCE: ABNORMAL
SP GR UR STRIP: 1.01 (ref 1–1.03)
SQUAMOUS #/AREA URNS AUTO: 1 /HPF (ref 0–1)
TACROLIMUS BLD-MCNC: 14.1 UG/L (ref 5–15)
TME LAST DOSE: NORMAL H
UROBILINOGEN UR STRIP-MCNC: 0 MG/DL (ref 0–2)
WBC # BLD AUTO: 2.8 10E9/L (ref 4–11)
WBC #/AREA URNS AUTO: 5 /HPF (ref 0–5)

## 2020-01-06 PROCEDURE — 83735 ASSAY OF MAGNESIUM: CPT | Performed by: INTERNAL MEDICINE

## 2020-01-06 PROCEDURE — G0463 HOSPITAL OUTPT CLINIC VISIT: HCPCS | Mod: ZF

## 2020-01-06 PROCEDURE — 36415 COLL VENOUS BLD VENIPUNCTURE: CPT | Performed by: INTERNAL MEDICINE

## 2020-01-06 PROCEDURE — 85027 COMPLETE CBC AUTOMATED: CPT | Performed by: INTERNAL MEDICINE

## 2020-01-06 PROCEDURE — 84100 ASSAY OF PHOSPHORUS: CPT | Performed by: INTERNAL MEDICINE

## 2020-01-06 PROCEDURE — 80197 ASSAY OF TACROLIMUS: CPT | Performed by: INTERNAL MEDICINE

## 2020-01-06 PROCEDURE — 80048 BASIC METABOLIC PNL TOTAL CA: CPT | Performed by: INTERNAL MEDICINE

## 2020-01-06 PROCEDURE — 81001 URINALYSIS AUTO W/SCOPE: CPT | Performed by: INTERNAL MEDICINE

## 2020-01-06 ASSESSMENT — PAIN SCALES - GENERAL: PAINLEVEL: MILD PAIN (3)

## 2020-01-06 ASSESSMENT — MIFFLIN-ST. JEOR: SCORE: 1472.68

## 2020-01-06 NOTE — LETTER
"1/6/2020      RE: Murray Nicholson  [address]  [place] [address]       Chief Complaint   Patient presents with     RECHECK     2 Weeks post kidney tx     Blood pressure 136/79, pulse 90, height 1.746 m (5' 8.75\"), weight 69.6 kg (153 lb 8 oz), SpO2 100 %.    Roxana Urbina [facility]      Transplant Surgery Progress Note    Transplants:  12/19/2019 (Kidney), 6/14/2018 (Heart); Postoperative day:  573 (Heart), 20 (Kidney)  S: 64 year old male with history of end stage kidney disease secondary to presumed diabetic nephropathy and hypertension. Initiated on HD July 2017.  He presently dialyzes THS at [place] dialysis unit via a LUE AV graft.  His EDW ~ 71.5 kg.  Patient's cardiac function has been stable on tacrolimus and mycophenolate mofetil.  Admitted after LDKT on 12/19.     Doing well.  Denies ant fevers, chills, nausea or vomiting    No urinary symptoms      Transplant History:    Transplant Type:  LDKT and Heart Tx  Donor Type: Living   Transplant Date:  12/19/2019 (Kidney), 6/14/2018 (Heart)   Ureteral Stent:  Yes   Crossmatch:  negative   DSA at Tx:  No  Baseline Cr: 1.26   DeNovo DSA: No    Acute Rejection Hx:  No    Present Maintenance Immunosuppression:  Tacrolimus and Mycophenolate mofetil    CMV IgG Ab Discordance:  No  EBV IgG Ab Discordance:  No    BK Viremia:  No  EBV Viremia:  No    Transplant Coordinator: Cami Mcknight     Transplant Office Phone Number: [phone]     Immunosuppressant Medications     Immunosuppressive Agents Disp Start End     mycophenolate (GENERIC EQUIVALENT) 250 MG capsule    180 capsule 12/22/2019     Sig - Route: Take 3 capsules (750 mg) by mouth 2 times daily - Oral    Class: E-Prescribe     tacrolimus (GENERIC EQUIVALENT) 0.5 MG capsule    60 capsule 1/7/2020     Sig - Route: Take 1 capsule (0.5 mg) by mouth 2 times daily Total dose = 2.5 mg BID - Oral    Class: E-Prescribe    Notes to Pharmacy: TXP DT 12/19/2019 (Kidney)TXP Dischg DT " 12/22/2019 DXZ94.0 [facility]     tacrolimus (GENERIC EQUIVALENT) 1 MG capsule    120 capsule 1/7/2020     Sig - Route: Take 2 capsules (2 mg) by mouth 2 times daily Total dose = 2.5 mg BID - Oral    Class: E-Prescribe    Notes to Pharmacy: TXP DT 12/19/2019 (Kidney)TXP Dischg DT 12/22/2019 DXZ94.0 [facility]          Possible Immunosuppression-related side effects:   []             headache  []             vivid dreams  []             irritability  []             cognitive difficuties  []             fine tremor  []             nausea  []             diarrhea  []             neuropathy      []             edema  []             renal calcineurin toxicity  []             hyperkalemia  []             post-transplant diabetes  []             decreased appetite  []             increased appetite  []             other:  []             none    Prescription Medications as of 1/8/2020       Rx Number Disp Refills Start End Last Dispensed Date Next Fill Date Owning Pharmacy    acetaminophen (TYLENOL) 325 MG tablet  50 tablet 1 12/22/2019    [place], [place] - [address] [facility]    Sig: Take 2 tablets (650 mg) by mouth every 4 hours as needed for other (multimodal surgical pain management along with NSAIDS and opioid medication as indicated based on pain control and physical function.)    Class: Local Print    Route: Oral    aspirin 81 MG EC tablet            Sig: Take 81 mg by mouth daily    Class: Historical    Route: Oral    atorvastatin (LIPITOR) 40 MG tablet  90 tablet 3 10/17/2019    [facility] #[id number] - [place], [place] - [address] [place] [address] AT [facility] & [facility]    Sig: Take 1 tablet (40 mg) by mouth daily    Class: E-Prescribe    Route: Oral    blood glucose (ACCU-CHEK GUIDE) test strip  100 strip 3 12/23/2019    [place], [place] - [address] [facility] [address]     Sig: Use to test blood sugar 2 times daily or as directed.    Class: E-Prescribe    blood glucose monitoring (ACCU-CHEK FASTCLIX) lancets  100 each 1 12/23/2019    [phone]    Sig: Use to test blood sugar 2 times daily or as directed.    Class: E-Prescribe    calcitRIOL (ROCALTROL) 0.25 MCG capsule  30 capsule 3 12/23/2019    [address]    Sig: Take 1 capsule (0.25 mcg) by mouth daily    Class: E-Prescribe    Route: Oral    fluticasone (FLONASE) 50 MCG/ACT nasal spray  11.1 mL 11 8/21/2019    [facility] #[id number] - [place], [place] - [address] [facility]    Sig: [place] 1 spray into both nostrils daily    Class: E-Prescribe    Notes to Pharmacy: Okay to dispense generic equivalent, other formulation, or similar medication covered by patient's insurance    Route: Both Nostrils    HYDROmorphone (DILAUDID) 2 MG tablet  20 tablet 0 12/22/2019    [address]    Sig: Take 1 tablet (2 mg) by mouth every 4 hours as needed for moderate to severe pain    Class: Local Print    Earliest Fill Date: 12/22/2019    Route: Oral    insulin glargine (LANTUS PEN) 100 UNIT/ML pen  15 mL 1 12/23/2019    [address] [phone]    Sig: Inject 18 Units Subcutaneous every morning    Class: E-Prescribe    Notes to Pharmacy: If Lantus is not covered by insurance, may substitute Basaglar at same dose and frequency.      Route: Subcutaneous    magnesium oxide (MAG-OX) 400 MG tablet  30 tablet 2 1/8/2020    [facility] #[id number] - [place], [place] - [address] [facility]    Sig: Take 1 tablet (400 mg) by mouth daily (with lunch)    Class: E-Prescribe    Route: Oral    melatonin 1 MG TABS tablet  120 tablet 1 7/20/2018    [facility]  [address]    Sig: Take 2 tablets (2 mg) by mouth nightly as needed for sleep    Class: E-Prescribe    Route: Oral    mupirocin (BACTROBAN) 2 % external ointment  15 g 0 9/27/2019    [facility] #[id number] - [place], [place] - [address] AT [facility] & [facility]    Sig: Apply topically 3 times daily    Class: E-Prescribe    Route: Topical    mycophenolate (GENERIC EQUIVALENT) 250 MG capsule  180 capsule 11 12/22/2019    [address]    Sig: Take 3 capsules (750 mg) by mouth 2 times daily    Class: E-Prescribe    Route: Oral    ondansetron (ZOFRAN-ODT) 4 MG ODT tab  10 tablet 1 12/22/2019    [address]    Sig: Take 1 tablet (4 mg) by mouth every 6 hours as needed for nausea or vomiting    Class: E-Prescribe    Route: Oral    pantoprazole (PROTONIX) 40 MG EC tablet        [address]    Sig: Take 40 mg by mouth daily    Class: Historical    Route: Oral    polyethylene glycol (MIRALAX/GLYCOLAX) packet  10 packet 1 12/23/2019    [address]    Sig: Take 17 g by mouth daily    Class: E-Prescribe    Route: Oral    senna-docusate (SENOKOT-S/PERICOLACE) 8.6-50 MG tablet  50 tablet 1 12/22/2019    [address]    Sig: Take 2 tablets by mouth 2 times daily    Class: E-Prescribe    Route: Oral    sulfamethoxazole-trimethoprim (BACTRIM/SEPTRA) 400-80 MG tablet  30 tablet 11 12/23/2019    [address]    Sig: Take 1 tablet by mouth daily    Class: E-Prescribe    Route: Oral    tacrolimus (GENERIC EQUIVALENT) 0.5 MG capsule  60 capsule 11 1/7/2020    [facility] #[id number] - [place], [place] - [address] AT [facility]    Sig: Take 1 capsule (0.5 mg) by mouth 2 times daily Total dose = 2.5 mg BID    Class: E-Prescribe    Notes to Pharmacy: TXP DT 12/19/2019 (Kidney)TXP Dischg DT 12/22/2019 DXZ94.0 [facility]    Route: Oral    tacrolimus (GENERIC EQUIVALENT) 1 MG capsule  120 capsule 11 1/7/2020    [facility] #[id number] - [place], [place] - [address] AT [facility]    Sig: Take 2 capsules (2 mg) by mouth 2 times daily Total dose = 2.5 mg BID    Class: E-Prescribe    Notes to Pharmacy: TXP DT 12/19/2019 (Kidney)TXP Dischg DT 12/22/2019 DXZ94.0 [facility]    Route: Oral    valACYclovir (VALTREX) 500 MG tablet  180 tablet 2 12/23/2019    [facility] [address] [place] [address] 1[address]124    Sig: Take 3 tablets (1,500 mg) by mouth 2 times daily    Class: E-Prescribe    Route: Oral    Vitamin D3 (CHOLECALCIFEROL) 25 mcg (1000 units) tablet  30 tablet 1 12/23/2019    [place], [place] - [address] [facility]    Sig: Take 1 tablet (25 mcg) by mouth daily    Class: E-Prescribe    Route: Oral      Clinic-Administered Medications as of 1/8/2020       Dose Frequency Start End    diatrizoate meglumine-sodium (GASTROGRAFIN/GASTROVIEW) 66-10 % solution 480 mL 480 mL ONCE 3/26/2019     Admin Instructions: MUST DILUTE before giving.  For every 5 mL of Gastroview, dilute with 8 oz water or non-pulp juice.    Route: Rectal          O:      General Appearance: in no apparent distress.   Skin: Normal, no rashes or jaundice  Heart: regular rate and rhythm, normal S1 and S2  Lungs: easy respirations, no audible wheezing.  Abdomen: flat, The wound is dry and intact, without hernia. The abdomen is non-tender. The kidney graft is not tender.  There is no ascites.  Extremities: Tremor absent.   Edema: absent.     Transplant Immunosuppression Labs Latest Ref Rng &  Units 1/6/2020 1/3/2020 12/31/2019 12/26/2019 12/24/2019   Tacro Level 5.0 - 15.0 ug/L 14.1 15.4(H) 11.0 10.0 5.6   Tacro Level - 1/5/20 820pm 20.15 2010 [id number] Not Provided 2,000   Creat 0.66 - 1.25 mg/dL 1.16 1.46(H) 1.30(H) 1.11 -   BUN 7 - 30 mg/dL 24 28 26 20 -   WBC 4.0 - 11.0 10e9/L 2.8(L) 2.5(L) 2.9(L) 3.9(L) -   Neutrophil % - - - 75.0 -   ANEU 1.6 - 8.3 10e9/L - - - 2.9 -       Chemistries:   Recent Labs   Lab Test 01/06/20  0842   BUN 24   CR 1.16   GFRESTIMATED 66   *     Lab Results   Component Value Date    A1C 4.5 12/24/2019    CPEPT 11.8 05/05/2018     Recent Labs   Lab Test 12/24/19  0838   ALBUMIN 3.8   BILITOTAL 1.1   ALKPHOS 278*   AST 15   ALT 18     Urine Studies:  Recent Labs   Lab Test 01/06/20  0850   COLOR Yellow   APPEARANCE Slightly Cloudy   URINEGLC Negative   URINEBILI Negative   URINEKETONE Negative   SG 1.012   UBLD Small*   URINEPH 5.0   PROTEIN Negative   NITRITE Negative   LEUKEST Negative   RBCU 2   WBCU 5     Recent Labs   Lab Test 12/24/19  0847 05/17/17  1225   UTPG 0.82* 0.67*     Hematology:   Recent Labs   Lab Test 01/06/20  0842 01/03/20  0820 12/31/19  0809   HGB 11.5* 11.0* 10.8*    225 208   WBC 2.8* 2.5* 2.9*     Coags:   Recent Labs   Lab Test 12/10/19  1121 11/22/19  1022   INR 1.15* 1.17*     HLA antibodies:   SA1 Hi Risk Neetu   Date Value Ref Range Status   12/24/2019 None  Final     SA1 Mod Risk Neetu   Date Value Ref Range Status   12/24/2019 None  Final     SA2 Hi Risk Neetu   Date Value Ref Range Status   12/24/2019 None  Final     SA2 Mod Risk Neetu   Date Value Ref Range Status   12/24/2019 None  Final       Assessment: Murray Nicholson is doing well s/p LDKT and Heart Tx:  Issues we addressed during his visit include:    Plan:    1. Graft function: cr is stable.   2. Immunosuppression Management: No change continue prograf 2.5mg BID and cellcept 750mg BID  .  Complexity of management:Medium.  Contributing factors: leukopenia  3. Leukopenia: monitor  4.  Incision: healing well  Followup: as directed    Total Time: 25 min,   Counselling Time: 15 min.          Maliha Jesus NP

## 2020-01-06 NOTE — TELEPHONE ENCOUNTER
ISSUE:   Tacrolimus IR level 14,1 on 1/6/2020 , goal 8-10, dose 3.5 mg BID.    PLAN:   Please call patient and confirm this was an accurate 12-hour trough. Verify Tacrolimus IR dose 3.5 mg BID. Confirm no new medications or illness. Confirm no missed doses. If accurate trough and accurate dose, decrease Tacrolimus IR dose to 2.5 mg BID and repeat labs as scheduled.    Ensure loose stool are improved after start of fiber supplement.    Cami Villarreal RN, BSN, [facility]      OUTCOME:   Spoke with patient, they confirm accurate trough level and current dose 3.5 mg BID. Patient confirmed dose change to 2.5 mg BID and to repeat labs in 2-3 days. Orders sent to preferred pharmacy for dose change and lab for repeat labs. Patient voiced understanding of plan.

## 2020-01-06 NOTE — PROGRESS NOTES
"Chief Complaint   Patient presents with     RECHECK     2 Weeks post kidney tx     Blood pressure 136/79, pulse 90, height 1.746 m (5' 8.75\"), weight 69.6 kg (153 lb 8 oz), SpO2 100 %.    Roxana Urbnia, [facility]    "

## 2020-01-07 RX ORDER — TACROLIMUS 1 MG/1
2 CAPSULE ORAL 2 TIMES DAILY
Qty: 120 CAPSULE | Refills: 11 | Status: SHIPPED | OUTPATIENT
Start: 2020-01-07 | End: 2020-01-21

## 2020-01-07 RX ORDER — TACROLIMUS 0.5 MG/1
0.5 CAPSULE ORAL 2 TIMES DAILY
Qty: 60 CAPSULE | Refills: 11 | Status: SHIPPED | OUTPATIENT
Start: 2020-01-07 | End: 2020-01-21

## 2020-01-08 DIAGNOSIS — Z94.0 KIDNEY REPLACED BY TRANSPLANT: ICD-10-CM

## 2020-01-08 RX ORDER — MAGNESIUM OXIDE 400 MG/1
400 TABLET ORAL
Qty: 30 TABLET | Refills: 2 | Status: SHIPPED | OUTPATIENT
Start: 2020-01-08 | End: 2020-02-10

## 2020-01-08 NOTE — PROGRESS NOTES
Transplant Surgery Progress Note    Transplants:  12/19/2019 (Kidney), 6/14/2018 (Heart); Postoperative day:  573 (Heart), 20 (Kidney)  S: 64 year old male with history of end stage kidney disease secondary to presumed diabetic nephropathy and hypertension. Initiated on HD July 2017.  He presently dialyzes THS at [place] dialysis unit via a LUE AV graft.  His EDW ~ 71.5 kg.  Patient's cardiac function has been stable on tacrolimus and mycophenolate mofetil.  Admitted after LDKT on 12/19.     Doing well.  Denies ant fevers, chills, nausea or vomiting    No urinary symptoms      Transplant History:    Transplant Type:  LDKT and Heart Tx  Donor Type: Living   Transplant Date:  12/19/2019 (Kidney), 6/14/2018 (Heart)   Ureteral Stent:  Yes   Crossmatch:  negative   DSA at Tx:  No  Baseline Cr: 1.26   DeNovo DSA: No    Acute Rejection Hx:  No    Present Maintenance Immunosuppression:  Tacrolimus and Mycophenolate mofetil    CMV IgG Ab Discordance:  No  EBV IgG Ab Discordance:  No    BK Viremia:  No  EBV Viremia:  No    Transplant Coordinator: Cami Mcknight     Transplant Office Phone Number: [phone]     Immunosuppressant Medications     Immunosuppressive Agents Disp Start End     mycophenolate (GENERIC EQUIVALENT) 250 MG capsule    180 capsule 12/22/2019     Sig - Route: Take 3 capsules (750 mg) by mouth 2 times daily - Oral    Class: E-Prescribe     tacrolimus (GENERIC EQUIVALENT) 0.5 MG capsule    60 capsule 1/7/2020     Sig - Route: Take 1 capsule (0.5 mg) by mouth 2 times daily Total dose = 2.5 mg BID - Oral    Class: E-Prescribe    Notes to Pharmacy: TXP DT 12/19/2019 (Kidney)TXP Dischg DT 12/22/2019 DXZ94.0 [facility]     tacrolimus (GENERIC EQUIVALENT) 1 MG capsule    120 capsule 1/7/2020     Sig - Route: Take 2 capsules (2 mg) by mouth 2 times daily Total dose = 2.5 mg BID - Oral    Class: E-Prescribe    Notes to Pharmacy: TXP DT 12/19/2019  (Kidney)TXP Dischg DT 12/22/2019 DXZ94.0 [facility]          Possible Immunosuppression-related side effects:   []             headache  []             vivid dreams  []             irritability  []             cognitive difficuties  []             fine tremor  []             nausea  []             diarrhea  []             neuropathy      []             edema  []             renal calcineurin toxicity  []             hyperkalemia  []             post-transplant diabetes  []             decreased appetite  []             increased appetite  []             other:  []             none    Prescription Medications as of 1/8/2020       Rx Number Disp Refills Start End Last Dispensed Date Next Fill Date Owning Pharmacy    acetaminophen (TYLENOL) 325 MG tablet  50 tablet 1 12/22/2019    [facility] [place], [place] - [address] [facility]    Sig: Take 2 tablets (650 mg) by mouth every 4 hours as needed for other (multimodal surgical pain management along with NSAIDS and opioid medication as indicated based on pain control and physical function.)    Class: Local Print    Route: Oral    aspirin 81 MG EC tablet            Sig: Take 81 mg by mouth daily    Class: Historical    Route: Oral    atorvastatin (LIPITOR) 40 MG tablet  90 tablet 3 10/17/2019    [id number] - [place], [place] - [address] [place] [address] & [facility]    Sig: Take 1 tablet (40 mg) by mouth daily    Class: E-Prescribe    Route: Oral    blood glucose (ACCU-CHEK GUIDE) test strip  100 strip 3 12/23/2019    [place], [place] - [address] [place] [address] [phone]    Sig: Use to test blood sugar 2 times daily or as directed.    Class: E-Prescribe    blood glucose monitoring (ACCU-CHEK FASTCLIX) lancets  100 each 1 12/23/2019    [place], [place] - [address] [place] [address] 6-558    Sig: Use to test blood sugar 2 times daily or as  directed.    Class: E-Prescribe    calcitRIOL (ROCALTROL) 0.25 MCG capsule  30 capsule 3 12/23/2019    [facility] [place], [place] - [address] [place] [address]    Sig: Take 1 capsule (0.25 mcg) by mouth daily    Class: E-Prescribe    Route: Oral    fluticasone (FLONASE) 50 MCG/ACT nasal spray  11.1 mL 11 8/21/2019    [facility] #[id number] - [place], [place] - [address] AT [facility]    Sig: [place] 1 spray into both nostrils daily    Class: E-Prescribe    Notes to Pharmacy: Okay to dispense generic equivalent, other formulation, or similar medication covered by patient's insurance    Route: Both Nostrils    HYDROmorphone (DILAUDID) 2 MG tablet  20 tablet 0 12/22/2019    [facility] [address]    Sig: Take 1 tablet (2 mg) by mouth every 4 hours as needed for moderate to severe pain    Class: Local Print    Earliest Fill Date: 12/22/2019    Route: Oral    insulin glargine (LANTUS PEN) 100 UNIT/ML pen  15 mL 1 12/23/2019    [address]    Sig: Inject 18 Units Subcutaneous every morning    Class: E-Prescribe    Notes to Pharmacy: If Lantus is not covered by insurance, may substitute Basaglar at same dose and frequency.      Route: Subcutaneous    magnesium oxide (MAG-OX) 400 MG tablet  30 tablet 2 1/8/2020    [facility] #[id number] - [place], [place] - [address] AT [facility]    Sig: Take 1 tablet (400 mg) by mouth daily (with lunch)    Class: E-Prescribe    Route: Oral    melatonin 1 MG TABS tablet  120 tablet 1 7/20/2018    [address] [phone]    Sig: Take 2 tablets (2 mg) by mouth nightly as needed for sleep    Class: E-Prescribe    Route: Oral    mupirocin (BACTROBAN) 2 % external ointment  15 g 0 9/27/2019    [facility] #[id number] - [place], [place] - [address] AT  [facility]    Sig: Apply topically 3 times daily    Class: E-Prescribe    Route: Topical    mycophenolate (GENERIC EQUIVALENT) 250 MG capsule  180 capsule 11 12/22/2019    [place], [place] - [address] [place] [address]    Sig: Take 3 capsules (750 mg) by mouth 2 times daily    Class: E-Prescribe    Route: Oral    ondansetron (ZOFRAN-ODT) 4 MG ODT tab  10 tablet 1 12/22/2019    [place], [place] - [address] [place] [address]    Sig: Take 1 tablet (4 mg) by mouth every 6 hours as needed for nausea or vomiting    Class: E-Prescribe    Route: Oral    pantoprazole (PROTONIX) 40 MG EC tablet        [place], [place] - [address] [place] [address] 0-502    Sig: Take 40 mg by mouth daily    Class: Historical    Route: Oral    polyethylene glycol (MIRALAX/GLYCOLAX) packet  10 packet 1 12/23/2019    [address]    Sig: Take 17 g by mouth daily    Class: E-Prescribe    Route: Oral    senna-docusate (SENOKOT-S/PERICOLACE) 8.6-50 MG tablet  50 tablet 1 12/22/2019    [place], [place] - [address] [place] [address]    Sig: Take 2 tablets by mouth 2 times daily    Class: E-Prescribe    Route: Oral    sulfamethoxazole-trimethoprim (BACTRIM/SEPTRA) 400-80 MG tablet  30 tablet 11 12/23/2019    [address]    Sig: Take 1 tablet by mouth daily    Class: E-Prescribe    Route: Oral    tacrolimus (GENERIC EQUIVALENT) 0.5 MG capsule  60 capsule 11 1/7/2020    [id number] - [place], [place] - [address] AT [facility]    Sig: Take 1 capsule (0.5 mg) by mouth 2 times daily Total dose = 2.5 mg BID    Class: E-Prescribe    Notes to Pharmacy: TXP DT 12/19/2019 (Kidney)TXP Dischg DT 12/22/2019 DXZ94.0 [facility]    Route: Oral    tacrolimus  (GENERIC EQUIVALENT) 1 MG capsule  120 capsule 11 1/7/2020    [id number] - [place] - [address] [facility] AT [facility] & [facility]    Sig: Take 2 capsules (2 mg) by mouth 2 times daily Total dose = 2.5 mg BID    Class: E-Prescribe    Notes to Pharmacy: TXP DT 12/19/2019 (Kidney)TXP Dischg DT 12/22/2019 DXZ94.0 [facility]    Route: Oral    valACYclovir (VALTREX) 500 MG tablet  180 tablet 2 12/23/2019    [place], [place] - [address] [facility] [phone]    Sig: Take 3 tablets (1,500 mg) by mouth 2 times daily    Class: E-Prescribe    Route: Oral    Vitamin D3 (CHOLECALCIFEROL) 25 mcg (1000 units) tablet  30 tablet 1 12/23/2019    [place], [place] - [address] [facility]    Sig: Take 1 tablet (25 mcg) by mouth daily    Class: E-Prescribe    Route: Oral      Clinic-Administered Medications as of 1/8/2020       Dose Frequency Start End    diatrizoate meglumine-sodium (GASTROGRAFIN/GASTROVIEW) 66-10 % solution 480 mL 480 mL ONCE 3/26/2019     Admin Instructions: MUST DILUTE before giving.  For every 5 mL of Gastroview, dilute with 8 oz water or non-pulp juice.    Route: Rectal          O:      General Appearance: in no apparent distress.   Skin: Normal, no rashes or jaundice  Heart: regular rate and rhythm, normal S1 and S2  Lungs: easy respirations, no audible wheezing.  Abdomen: flat, The wound is dry and intact, without hernia. The abdomen is non-tender. The kidney graft is not tender.  There is no ascites.  Extremities: Tremor absent.   Edema: absent.     Transplant Immunosuppression Labs Latest Ref Rng & Units 1/6/2020 1/3/2020 12/31/2019 12/26/2019 12/24/2019   Tacro Level 5.0 - 15.0 ug/L 14.1 15.4(H) 11.0 10.0 5.6   Tacro Level - 1/5/20 820pm 20.15 2010 [id number] Not Provided 2,000   Creat 0.66 - 1.25 mg/dL 1.16 1.46(H) 1.30(H) 1.11 -   BUN 7 - 30 mg/dL 24 28 26 20 -   WBC 4.0 - 11.0 10e9/L  2.8(L) 2.5(L) 2.9(L) 3.9(L) -   Neutrophil % - - - 75.0 -   ANEU 1.6 - 8.3 10e9/L - - - 2.9 -       Chemistries:   Recent Labs   Lab Test 01/06/20  0842   BUN 24   CR 1.16   GFRESTIMATED 66   *     Lab Results   Component Value Date    A1C 4.5 12/24/2019    CPEPT 11.8 05/05/2018     Recent Labs   Lab Test 12/24/19  0838   ALBUMIN 3.8   BILITOTAL 1.1   ALKPHOS 278*   AST 15   ALT 18     Urine Studies:  Recent Labs   Lab Test 01/06/20  0850   COLOR Yellow   APPEARANCE Slightly Cloudy   URINEGLC Negative   URINEBILI Negative   URINEKETONE Negative   SG 1.012   UBLD Small*   URINEPH 5.0   PROTEIN Negative   NITRITE Negative   LEUKEST Negative   RBCU 2   WBCU 5     Recent Labs   Lab Test 12/24/19  0847 05/17/17  1225   UTPG 0.82* 0.67*     Hematology:   Recent Labs   Lab Test 01/06/20  0842 01/03/20  0820 12/31/19  0809   HGB 11.5* 11.0* 10.8*    225 208   WBC 2.8* 2.5* 2.9*     Coags:   Recent Labs   Lab Test 12/10/19  1121 11/22/19  1022   INR 1.15* 1.17*     HLA antibodies:   SA1 Hi Risk Neetu   Date Value Ref Range Status   12/24/2019 None  Final     SA1 Mod Risk Neetu   Date Value Ref Range Status   12/24/2019 None  Final     SA2 Hi Risk Neetu   Date Value Ref Range Status   12/24/2019 None  Final     SA2 Mod Risk Neetu   Date Value Ref Range Status   12/24/2019 None  Final       Assessment: Murray Nicholson is doing well s/p LDKT and Heart Tx:  Issues we addressed during his visit include:    Plan:    1. Graft function: cr is stable.   2. Immunosuppression Management: No change continue prograf 2.5mg BID and cellcept 750mg BID  .  Complexity of management:Medium.  Contributing factors: leukopenia  3. Leukopenia: monitor  4. Incision: healing well  Followup: as directed    Total Time: 25 min,   Counselling Time: 15 min.

## 2020-01-09 DIAGNOSIS — Z94.0 KIDNEY REPLACED BY TRANSPLANT: ICD-10-CM

## 2020-01-09 DIAGNOSIS — Z79.899 LONG TERM USE OF DRUG: ICD-10-CM

## 2020-01-09 LAB
ANION GAP SERPL CALCULATED.3IONS-SCNC: 10 MMOL/L (ref 3–14)
BUN SERPL-MCNC: 21 MG/DL (ref 7–30)
CALCIUM SERPL-MCNC: 9.2 MG/DL (ref 8.5–10.1)
CHLORIDE SERPL-SCNC: 110 MMOL/L (ref 94–109)
CO2 SERPL-SCNC: 19 MMOL/L (ref 20–32)
CREAT SERPL-MCNC: 1.24 MG/DL (ref 0.66–1.25)
ERYTHROCYTE [DISTWIDTH] IN BLOOD BY AUTOMATED COUNT: 13.8 % (ref 10–15)
GFR SERPL CREATININE-BSD FRML MDRD: 61 ML/MIN/{1.73_M2}
GLUCOSE SERPL-MCNC: 218 MG/DL (ref 70–99)
HCT VFR BLD AUTO: 34.2 % (ref 40–53)
HGB BLD-MCNC: 11.6 G/DL (ref 13.3–17.7)
MCH RBC QN AUTO: 34.4 PG (ref 26.5–33)
MCHC RBC AUTO-ENTMCNC: 33.9 G/DL (ref 31.5–36.5)
MCV RBC AUTO: 102 FL (ref 78–100)
PLATELET # BLD AUTO: 284 10E9/L (ref 150–450)
POTASSIUM SERPL-SCNC: 4.3 MMOL/L (ref 3.4–5.3)
RBC # BLD AUTO: 3.37 10E12/L (ref 4.4–5.9)
SODIUM SERPL-SCNC: 139 MMOL/L (ref 133–144)
TACROLIMUS BLD-MCNC: 10.9 UG/L (ref 5–15)
TME LAST DOSE: NORMAL H
WBC # BLD AUTO: 3.3 10E9/L (ref 4–11)

## 2020-01-09 PROCEDURE — 36415 COLL VENOUS BLD VENIPUNCTURE: CPT | Performed by: INTERNAL MEDICINE

## 2020-01-09 PROCEDURE — 80048 BASIC METABOLIC PNL TOTAL CA: CPT | Performed by: INTERNAL MEDICINE

## 2020-01-09 PROCEDURE — 80197 ASSAY OF TACROLIMUS: CPT | Performed by: INTERNAL MEDICINE

## 2020-01-09 PROCEDURE — 85027 COMPLETE CBC AUTOMATED: CPT | Performed by: NURSE PRACTITIONER

## 2020-01-10 ENCOUNTER — ALLIED HEALTH/NURSE VISIT (OUTPATIENT)
Dept: PHARMACY | Facility: CLINIC | Age: 65
End: 2020-01-10
Payer: COMMERCIAL

## 2020-01-10 DIAGNOSIS — N18.5 TYPE 2 DIABETES MELLITUS WITH STAGE 5 CHRONIC KIDNEY DISEASE NOT ON CHRONIC DIALYSIS, WITHOUT LONG-TERM CURRENT USE OF INSULIN (H): Primary | ICD-10-CM

## 2020-01-10 DIAGNOSIS — E11.22 TYPE 2 DIABETES MELLITUS WITH STAGE 5 CHRONIC KIDNEY DISEASE NOT ON CHRONIC DIALYSIS, WITHOUT LONG-TERM CURRENT USE OF INSULIN (H): Primary | ICD-10-CM

## 2020-01-10 PROCEDURE — 99207 ZZC NO CHARGE LOS: CPT | Performed by: PHARMACIST

## 2020-01-10 NOTE — PROGRESS NOTES
SUBJECTIVE/OBJECTIVE:                Murray Nicholson is a 64 year old male called for a Follow-up MTM visit.  He was referred post txp.     Chief Complaint: follow-up to discuss blood sugars.     Allergies/ADRs: Reviewed in Epic  Tobacco:  reports that he quit smoking about 25 years ago. His smoking use included cigarettes. He started smoking about 36 years ago. He has a 20.00 pack-year smoking history. He has never used smokeless tobacco.  Alcohol: not currently using  Caffeine: 0-1 cups/day of coffee, 1 cups/day of tea  PMH: Reviewed in Epic    Medication Adherence/Access:  Patient uses pill box(es).  Patient takes medications 4 time(s) per day. 7am, 1-2, 4-6, 7-8:30pm  Per patient, misses medication 0 times per week.   Medication barriers: none. Is concerned about cost of insulin, Prescribed Valcyte which cost $800 dollars. Is taking Valacyclovir instead.   The patient fills medications at [facility]: NO, fills medications at [facility].    Diabetes:  Pt currently taking Holding Lantus.  Pt is not experiencing side effects.  SMBG: one to two times daily.   Ranges (patient reported):   Date FBG/ 2hours post Dinner /2hours post 10 PM   1/10/20 156     1/9 148     1/8 144     1/7 149     1/6 157     1/5 156     1/4 142 136 191     Date FBG/ 2hours post Dinner /2hours post   12/23 (not taking Lantus) 110 112   12/24  115 124   12/25  120    12/26 123    12/27 (started Lantus 18 units) 144    12/28 18 U 128    12/29 18 U 107    12/30 10 U 95    12/31 10 U 103    Patient is not experiencing hypoglycemia  Recent symptoms of high blood sugar? none    Today's Vitals: There were no vitals taken for this visit.    ASSESSMENT:                 Medication Adherence: fair, no issues today.    Diabetes: BG is running mostly at goal to a touch high, 140-156 fasting. Can use loose control goals considering hx of heart txp, FBG . Will put on a little bit of Lantus to get him below 150. Long term likely can use metformin as long as  his GFR is above 45ml/min.     PLAN:                Pt to...  1. Use Lantus 5 units daily.     I spent 15 minutes with this patient today. I offer these suggestions for consideration by txp team. A copy of the visit note was provided to the patient's referring provider.    Will follow up in 4 weeks.    The patient was given a summary of these recommendations as an after visit summary.    Eduardo Lea PharmD  [facility] Pharmacist    Phone: [phone]

## 2020-01-13 DIAGNOSIS — Z94.0 KIDNEY REPLACED BY TRANSPLANT: ICD-10-CM

## 2020-01-13 DIAGNOSIS — Z79.899 LONG TERM USE OF DRUG: ICD-10-CM

## 2020-01-13 LAB
ANION GAP SERPL CALCULATED.3IONS-SCNC: 10 MMOL/L (ref 3–14)
BUN SERPL-MCNC: 22 MG/DL (ref 7–30)
CALCIUM SERPL-MCNC: 9.1 MG/DL (ref 8.5–10.1)
CHLORIDE SERPL-SCNC: 108 MMOL/L (ref 94–109)
CO2 SERPL-SCNC: 19 MMOL/L (ref 20–32)
CREAT SERPL-MCNC: 1.26 MG/DL (ref 0.66–1.25)
ERYTHROCYTE [DISTWIDTH] IN BLOOD BY AUTOMATED COUNT: 14.2 % (ref 10–15)
GFR SERPL CREATININE-BSD FRML MDRD: 59 ML/MIN/{1.73_M2}
GLUCOSE SERPL-MCNC: 206 MG/DL (ref 70–99)
HCT VFR BLD AUTO: 34.3 % (ref 40–53)
HGB BLD-MCNC: 11.5 G/DL (ref 13.3–17.7)
MAGNESIUM SERPL-MCNC: 1.4 MG/DL (ref 1.6–2.3)
MCH RBC QN AUTO: 33.9 PG (ref 26.5–33)
MCHC RBC AUTO-ENTMCNC: 33.5 G/DL (ref 31.5–36.5)
MCV RBC AUTO: 101 FL (ref 78–100)
PHOSPHATE SERPL-MCNC: 4.3 MG/DL (ref 2.5–4.5)
PLATELET # BLD AUTO: 259 10E9/L (ref 150–450)
POTASSIUM SERPL-SCNC: 4.1 MMOL/L (ref 3.4–5.3)
RBC # BLD AUTO: 3.39 10E12/L (ref 4.4–5.9)
SODIUM SERPL-SCNC: 137 MMOL/L (ref 133–144)
WBC # BLD AUTO: 3.8 10E9/L (ref 4–11)

## 2020-01-13 PROCEDURE — 83735 ASSAY OF MAGNESIUM: CPT | Performed by: FAMILY MEDICINE

## 2020-01-13 PROCEDURE — 36415 COLL VENOUS BLD VENIPUNCTURE: CPT | Performed by: INTERNAL MEDICINE

## 2020-01-13 PROCEDURE — 85027 COMPLETE CBC AUTOMATED: CPT | Performed by: FAMILY MEDICINE

## 2020-01-13 PROCEDURE — 84100 ASSAY OF PHOSPHORUS: CPT | Performed by: FAMILY MEDICINE

## 2020-01-13 PROCEDURE — 80048 BASIC METABOLIC PNL TOTAL CA: CPT | Performed by: INTERNAL MEDICINE

## 2020-01-13 PROCEDURE — 80197 ASSAY OF TACROLIMUS: CPT | Performed by: INTERNAL MEDICINE

## 2020-01-14 ENCOUNTER — TELEPHONE (OUTPATIENT)
Dept: TRANSPLANT | Facility: CLINIC | Age: 65
End: 2020-01-14

## 2020-01-14 DIAGNOSIS — E87.20 METABOLIC ACIDOSIS: Primary | ICD-10-CM

## 2020-01-14 LAB
TACROLIMUS BLD-MCNC: 9.8 UG/L (ref 5–15)
TME LAST DOSE: NORMAL H

## 2020-01-14 RX ORDER — SODIUM BICARBONATE 650 MG/1
650 TABLET ORAL 2 TIMES DAILY
Qty: 60 TABLET | Refills: 1 | Status: SHIPPED | OUTPATIENT
Start: 2020-01-14 | End: 2020-01-21

## 2020-01-14 NOTE — TELEPHONE ENCOUNTER
CO2 running low at 19. Any diarrhea?  Start sodium bicarb 650 BID.    Murray denies any diarrhea, but voiced understanding to start sodium bicarbonate. Orders sent to his preferred pharmacy.     Blood sugars elevated - ensure adequate coverage.   Murray does report that he is still working  Eduardo Lea for sugar coverage.  Fasting blood sugars at home are 140 - 150.    Murray asked if it is okay to resume sex:  RNCC advised that it is okay for Murray to resume sexual intercourse, please listen to your body and if uncomfortable, try at another time or in a more comfortable position.     RNCC did discuss stent removal with Murray, he is aware of appointment and will have labs drawn that AM, appt made per his request.  Okay NOT to have labs drawn on 1/29, but resume regular lab draws on 1/30.

## 2020-01-15 ENCOUNTER — TELEPHONE (OUTPATIENT)
Dept: TRANSPLANT | Facility: CLINIC | Age: 65
End: 2020-01-15

## 2020-01-15 DIAGNOSIS — Z94.0 KIDNEY REPLACED BY TRANSPLANT: ICD-10-CM

## 2020-01-15 RX ORDER — CALCITRIOL 0.25 UG/1
0.25 CAPSULE, LIQUID FILLED ORAL DAILY
Qty: 30 CAPSULE | Refills: 3 | Status: SHIPPED | OUTPATIENT
Start: 2020-01-15 | End: 2020-01-29

## 2020-01-15 NOTE — TELEPHONE ENCOUNTER
Post Kidney and Pancreas Transplant Team Conference  Date: 1/15/2020  Transplant Coordinator: Lillie Ulloa, Cami Villarreal     Attendees:  [x]  Dr. Quijano  [x] Patt Johns LPN     [x]  Dr. Ulloa [x] Edith Ward, TONY [] Donna Wheat LPN   [x]  Dr. Escobar [] Marielena Romero RN     [] Radha Gray RN [x] Eduardo Lea, EllisD   [] Dr. Ayala [x] Jennifer Villarreal, TONY    [x] Dr. Cheney [] Fantasma Dorsey RN    [] Dr. Chase [] Autumn Mendez RN    [] Dr. Cowan [] Kari Carr RN     [] Anastasia Tanner, TONY    [] Surgery Fellow [x] Alma Rosa Monique RN    [] Maliha Jesus NP [] Scarlet Haines RN        Verbal Plan Read Back:   No changes at this time    Routed to RN Coordinator   Patt Johns LPN

## 2020-01-16 ENCOUNTER — TELEPHONE (OUTPATIENT)
Dept: RHEUMATOLOGY | Facility: CLINIC | Age: 65
End: 2020-01-16

## 2020-01-16 DIAGNOSIS — Z94.0 KIDNEY REPLACED BY TRANSPLANT: ICD-10-CM

## 2020-01-16 DIAGNOSIS — Z79.899 LONG TERM USE OF DRUG: ICD-10-CM

## 2020-01-16 LAB
TACROLIMUS BLD-MCNC: 8.7 UG/L (ref 5–15)
TME LAST DOSE: 2055 H

## 2020-01-16 PROCEDURE — 36415 COLL VENOUS BLD VENIPUNCTURE: CPT | Performed by: INTERNAL MEDICINE

## 2020-01-16 PROCEDURE — 80197 ASSAY OF TACROLIMUS: CPT | Performed by: INTERNAL MEDICINE

## 2020-01-17 ENCOUNTER — OFFICE VISIT (OUTPATIENT)
Dept: RHEUMATOLOGY | Facility: CLINIC | Age: 65
End: 2020-01-17
Attending: INTERNAL MEDICINE
Payer: MEDICARE

## 2020-01-17 VITALS
TEMPERATURE: 97.5 F | HEART RATE: 95 BPM | HEIGHT: 69 IN | SYSTOLIC BLOOD PRESSURE: 131 MMHG | BODY MASS INDEX: 22.47 KG/M2 | OXYGEN SATURATION: 100 % | DIASTOLIC BLOOD PRESSURE: 76 MMHG | WEIGHT: 151.7 LBS

## 2020-01-17 DIAGNOSIS — M25.649 FINGER STIFFNESS, UNSPECIFIED LATERALITY: Primary | ICD-10-CM

## 2020-01-17 PROCEDURE — G0463 HOSPITAL OUTPT CLINIC VISIT: HCPCS | Mod: ZF

## 2020-01-17 ASSESSMENT — MIFFLIN-ST. JEOR: SCORE: 1459.52

## 2020-01-17 ASSESSMENT — PAIN SCALES - GENERAL: PAINLEVEL: NO PAIN (0)

## 2020-01-17 NOTE — LETTER
2020      RE: Murray Nicholson  [address]  [place] [address]       [facility]  Rheumatology Clinic  Markell Clemons MD  2020     Name: Murray Nicholson  MRN: [id number]  Age: 65 year old  : 1955  Referring provider: Established Patient    Assessment and Plan:  # Chronic knuckle and finger stiffness:  Exam shows significant limitation in flexion as well as extension of multiple MCPs and PIPs of both hands. There is a trigger thumb on the left with a palpable nodule in the palm at the base orf the left thumb. No synovial thickening noted in hands, wrists, or small joints of the feet. Blood work from 2020 showed creatinine as stable at 1.26; CBC showed mild leukopenia with white count of 3.8; stable mild anemia with hemoglobin of 11.5; urinalysis showed 5 white blood cells per high powered field. Uric acid level on 2019 was 3.9. Rheumatoid factor was negative in 2019. X-ray of the hands on 2019 showed mild scattered degenerative changes with osteopenic appearance of the bones and mild periostitis along the distal radius bilaterally.    Chronic restricted range of motion in small joints of the hands could be due to prior metabolic disturbance related to longstanding diabetes and/or renal failure. I find no evidence of active inflammatory arthropathy, and x-rays show no evidence of joint space narrowing or erosive bony damage. Skin exam shows no evidence of sclerodactyly or bound-down skin that might account for joint motion limitation. I recommend completing work up for inflammatory arthropathy with CCP antibody, repeat sedimentation rate and CRP, and parathyroid hormone. However, I doubt that patient would benefit from immunotherapy. I think, rather, that physical therapeutic methods including vigilant attention to range of motion exercises for the MCPs and PIPs, are of paramount importance. Patient is already scheduled for hand surgery follow up to  address ongoing trigger thumb symptoms on the left. I will contact the patient with results of planned laboratory tests and will arrange follow up as needed.    # Non crystal proven gout: no current symptoms or exam signs of tophi or inflammatory joint disease. 12-19 uric acid is normal at 3.5. I do not think that crystalline arthropathy is the cause of the symptoms discussed above. No urate lowering therapy is necessary.    Orders:  - Cyclic Citrullinated Peptide Antibody IgG  - CRP inflammation  - Erythrocyte sedimentation rate auto  - Parathyroid Hormone Intact     Follow-up: Return if symptoms worsen or fail to improve.    HPI:   Murray Nicholson is a 65 year old male with a history including coronary artery disease, end stage renal disease, type II diabetes, and polyarticular gout who presents for follow-up. The patient was last seen in Rheumatology by Dr. Tang on 03/16/2018, at which time no gout flares were noted on daily pyophylactic prednisone 5 mg and 40 mg allopurinol daily. The plan was to decrease daily allopurinol to 300 mg and to try prednisone 40 mg daily for 3 days for gout flares.    Today, the patient reports that he has lost 75 lbs due to his diet and medical issues including hospitalizations and fasting and has not had any gout flares. He states that he has had limited finger range of motion for around one year detailed as left thumb catching, inability to fully form fists, some tingling and stiffness in the fingers, pain to the left thumb with pressure, and diminished fine motor function in the fingers. He explains that a previous provider thought his issues to be related to carpal tunnel as he was having numbness, so he has worn nighttime wrist braces and was given stretching exercises which had not helped and were discontinued some time ago. He notes that he did consult with a surgeon for his left thumb pain and he was given a cortisone injection into the base of the thumb. He describes his  stiffness as constant and not more significant in the morning time. He voices that he has tool that he uses to open objects such as jars.     He explains that his hands constantly feel cold since his heart transplant.     He reports that he received a new heart on 06/14/2018 and later a kidney transplant on December 2019. He reports that his left shoulder is bothersome sometimes, so he stopped wearing his bag over that left shoulder. Otherwise, he denies any other issues.     He states that he has not been using allopurinol or prednisone recently.    The patient was seen by Dr. Jesus in solid organ transplant on January 6th in follow up of kidney transplant. Transplant history of diseased donor transplant on December 19th 2019 was reviewed. Graft function was judged stable. Immunosuppression with Prograf and CellCept was continued.      Review of Systems:   Pertinent items are noted in HPI or as below, remainder of complete ROS is negative.      No recent problems with hearing or vision. No swallowing problems.   No breathing difficulty, shortness of breath, coughing, or wheezing.  No chest pain or palpitations.  No heart burn, indigestion, abdominal pain, nausea, vomiting, diarrhea.  No urination problems, no bloody, cloudy urine, no dysuria.  No numbing, tingling, weakness.  No headaches or confusion.  No rashes. No easy bleeding or bruising.     Active Medications:   Current Outpatient Medications:      acetaminophen (TYLENOL) 325 MG tablet, Take 2 tablets (650 mg) by mouth every 4 hours as needed for other (multimodal surgical pain management along with NSAIDS and opioid medication as indicated based on pain control and physical function.), Disp: 50 tablet, Rfl: 1     aspirin 81 MG EC tablet, Take 81 mg by mouth daily, Disp: , Rfl:      atorvastatin (LIPITOR) 40 MG tablet, Take 1 tablet (40 mg) by mouth daily, Disp: 90 tablet, Rfl: 3     blood glucose (ACCU-CHEK GUIDE) test strip, Use to test blood sugar 2  times daily or as directed., Disp: 100 strip, Rfl: 3     blood glucose monitoring (ACCU-CHEK FASTCLIX) lancets, Use to test blood sugar 2 times daily or as directed., Disp: 100 each, Rfl: 1     calcitRIOL (ROCALTROL) 0.25 MCG capsule, Take 1 capsule (0.25 mcg) by mouth daily, Disp: 30 capsule, Rfl: 3     fluticasone (FLONASE) 50 MCG/ACT nasal spray, Spray 1 spray into both nostrils daily (Patient taking differently: Spray 1 spray into both nostrils daily as needed ), Disp: 11.1 mL, Rfl: 11     HYDROmorphone (DILAUDID) 2 MG tablet, Take 1 tablet (2 mg) by mouth every 4 hours as needed for moderate to severe pain, Disp: 20 tablet, Rfl: 0     insulin glargine (LANTUS PEN) 100 UNIT/ML pen, Inject 18 Units Subcutaneous every morning (Patient taking differently: Inject 5 Units Subcutaneous every morning ), Disp: 15 mL, Rfl: 1     magnesium oxide (MAG-OX) 400 MG tablet, Take 1 tablet (400 mg) by mouth daily (with lunch), Disp: 30 tablet, Rfl: 2     melatonin 1 MG TABS tablet, Take 2 tablets (2 mg) by mouth nightly as needed for sleep, Disp: 120 tablet, Rfl: 1     mupirocin (BACTROBAN) 2 % external ointment, Apply topically 3 times daily, Disp: 15 g, Rfl: 0     mycophenolate (GENERIC EQUIVALENT) 250 MG capsule, Take 3 capsules (750 mg) by mouth 2 times daily, Disp: 180 capsule, Rfl: 11     ondansetron (ZOFRAN-ODT) 4 MG ODT tab, Take 1 tablet (4 mg) by mouth every 6 hours as needed for nausea or vomiting, Disp: 10 tablet, Rfl: 1     pantoprazole (PROTONIX) 40 MG EC tablet, Take 40 mg by mouth daily, Disp: , Rfl:      polyethylene glycol (MIRALAX/GLYCOLAX) packet, Take 17 g by mouth daily, Disp: 10 packet, Rfl: 1     senna-docusate (SENOKOT-S/PERICOLACE) 8.6-50 MG tablet, Take 2 tablets by mouth 2 times daily, Disp: 50 tablet, Rfl: 1     sodium bicarbonate 650 MG tablet, Take 1 tablet (650 mg) by mouth 2 times daily, Disp: 60 tablet, Rfl: 1     sulfamethoxazole-trimethoprim (BACTRIM/SEPTRA) 400-80 MG tablet, Take 1 tablet by  mouth daily, Disp: 30 tablet, Rfl: 11     tacrolimus (GENERIC EQUIVALENT) 0.5 MG capsule, Take 1 capsule (0.5 mg) by mouth 2 times daily Total dose = 2.5 mg BID, Disp: 60 capsule, Rfl: 11     tacrolimus (GENERIC EQUIVALENT) 1 MG capsule, Take 2 capsules (2 mg) by mouth 2 times daily Total dose = 2.5 mg BID, Disp: 120 capsule, Rfl: 11     valACYclovir (VALTREX) 500 MG tablet, Take 3 tablets (1,500 mg) by mouth 2 times daily, Disp: 180 tablet, Rfl: 2     Vitamin D3 (CHOLECALCIFEROL) 25 mcg (1000 units) tablet, Take 1 tablet (25 mcg) by mouth daily, Disp: 30 tablet, Rfl: 1  No current facility-administered medications for this visit.     Facility-Administered Medications Ordered in Other Visits:      diatrizoate meglumine-sodium (GASTROGRAFIN/GASTROVIEW) 66-10 % solution 480 mL, 480 mL, Rectal, Once, Christopher Baker MD    Allergies:  Norco [Hydrocodone-Acetaminophen]   Cats   Isosorbide   Penicillins   Seasonal Allergies   Shrimp     Past Medical History:  Heart failure with preserved ejection fraction  Allergic rhinitis  Anemia of chronic kidney failure   Aortic stenosis   Ascending aortic aneurysm    Bicuspid aortic valve   Coronary artery disease   Congestive heart failure, unspecified   Dialysis patient   Dyslipidemia   Esophageal reflux   End stage renal disease  Hearing problem   Heart replaced by transplant  Hypersomnia with sleep apnea, unspecified   Hypertension   Ileostomy status   Immunosuppression    Monoclonal gammopathy of unknown significance  Mitral regurgitation   Obstructive sleep apnea  Pneumonia   Systolic heart failure  Type 2 diabetes mellitus     Past Surgical History:  Colonoscopy 5/3/2018  Create fistula arteriovenous upper extremity bovine, left 5/8/2019  Coronary angiogram 6/28/2019  Heart biopsy 2/1/2019, 3/22/2019, 6/28/2019, 10/28/2019  Right heart cath 6/28/2019  Esophagoscopy, gastroscopy, duodenoscopy, combined 5/7/2018  Exam under anesthesia rectum 8/12/2018  Incision and  "drainage rectum, combined 8/12/2018  IR dialysis fistulogram left 9/25/2019, 11/22/2019  IR dialysis pta 9/25/2019, 11/22/2019  Laparoscopic assisted colostomy takedown 12/11/2018  Laparoscopic assisted sigmoid colectomy 8/14/2018  Laparoscopic herniorrhaphy inguinal bilateral, bilateral 7/24/2015  Laparoscopic insertion catheter peritoneal dialysis 6/22/2017  PICC insertion, left 04/22/2018  Remove catheter peritoneal, right 1/15/2018  Sigmoidoscopy flexible 11/21/2018, 12/11/2018  Takedown ileostomy 3/27/2019  Transplant heart recipient 6/14/2018    Family History:    The patient's family history includes C.A.D. in his father; Cerebrovascular Disease in his father; Diabetes in his father; Hypertension in his father.    Social History:  The patient reports that he quit smoking about 25 years ago. His smoking use included cigarettes. He started smoking about 36 years ago. He has a 20.00 pack-year smoking history. He has never used smokeless tobacco. He reports that he does not drink alcohol or use drugs.   PCP: Yahir Turcios  Marital Status:      Physical Exam:   /76   Pulse 95   Temp 97.5  F (36.4  C) (Oral)   Ht 1.746 m (5' 8.75\")   Wt 68.8 kg (151 lb 11.2 oz)   SpO2 100%   BMI 22.57 kg/m      Wt Readings from Last 4 Encounters:   01/17/20 68.8 kg (151 lb 11.2 oz)   01/06/20 69.6 kg (153 lb 8 oz)   12/26/19 71.6 kg (157 lb 13.6 oz)   12/23/19 69.5 kg (153 lb 3.5 oz)     Constitutional: Well-developed, appearing stated age; cooperative.  Eyes: Normal EOM, PERRLA, vision, conjunctiva, sclera.  ENT: Normal external ears, nose, hearing, lips, teeth, gums, throat. No mucous membrane lesions, normal saliva pool.  Neck: No mass or thyroid enlargement.  Resp: Lungs clear to auscultation, nl to palpation.  CV: RRR, no murmurs, rubs or gallops, no edema.  GI: No ABD mass or tenderness, no HSM.  : Not tested.  Lymph: No cervical, supraclavicular, inguinal or epitrochlear nodes.  MS: The TMJ, neck, " shoulder, elbow, spine, hip, knee, ankle, and foot MTP/IP joints were examined and found normal. Palpable nodule at the volar base of the left thumb with some triggering on flexion and extension, but no nodules at the bases of his other fingers. The right 3rd PIP lacks approximately 30 degrees of extension. Marked reduction in both PIP and MCP flexion in both hands in all 4 non-thumb digits. Reduced  strength. Toe range of motion is fluid and unrestricted.  Skin: No nail pitting, alopecia, rash, nodules, or lesions.  Neuro: Normal cranial nerves, strength, sensation, DTRs.   Psych: Normal judgement, orientation, memory, affect.     Laboratory:   RHEUM RESULTS Latest Ref Rng & Units 1/6/2020 1/9/2020 1/13/2020   SED RATE 0 - 20 mm/h - - -   CRP, INFLAMMATION 0.0 - 8.0 mg/L - - -   CK TOTAL 30 - 300 U/L - - -   RHEUMATOID FACTOR <20 IU/mL - - -   AST 0 - 45 U/L - - -   ALT 0 - 70 U/L - - -   ALBUMIN 3.4 - 5.0 g/dL - - -   WBC 4.0 - 11.0 10e9/L 2.8(L) 3.3(L) 3.8(L)   RBC 4.4 - 5.9 10e12/L 3.42(L) 3.37(L) 3.39(L)   HGB 13.3 - 17.7 g/dL 11.5(L) 11.6(L) 11.5(L)   HCT 40.0 - 53.0 % 34.4(L) 34.2(L) 34.3(L)   MCV 78 - 100 fl 101(H) 102(H) 101(H)   MCHC 31.5 - 36.5 g/dL 33.4 33.9 33.5   RDW 10.0 - 15.0 % 13.7 13.8 14.2    - 450 10e9/L 232 284 259   CREATININE 0.66 - 1.25 mg/dL 1.16 1.24 1.26(H)   GFR ESTIMATE, IF BLACK >60 mL/min/[1.73:m2] 76 70 69   GFR ESTIMATE >60 mL/min/[1.73:m2] 66 61 59(L)    - 1,620 mg/dL - - -   IGA 70 - 380 mg/dL - - -   IGM 60 - 265 mg/dL - - -   HEPATITIS C ANTIBODY NR:Nonreactive - - -     Rheumatoid Factor   Date Value Ref Range Status   09/30/2019 <20 <20 IU/mL Final     PATO interpretation   Date Value Ref Range Status   09/30/2019 Borderline Positive (A) NEG^Negative Final     Comment:                                        Reference range:  <1:40  NEGATIVE  1:40 - 1:80  BORDERLINE POSITIVE  >1:80 POSITIVE       PATO pattern 1   Date Value Ref Range Status   09/30/2019 SPECKLED   Final     PATO titer 1   Date Value Ref Range Status   09/30/2019 1:40  Final     Beta 2 Glycoprotein 1 Antibody IgG   Date Value Ref Range Status   05/05/2018 2.0 <7 U/mL Final     Comment:     Negative     Beta 2 Glycoprotein 1 Antibody IgM   Date Value Ref Range Status   05/05/2018 <0.9 <7 U/mL Final     Comment:     Negative     Cardiolipin Antibody IgG   Date Value Ref Range Status   05/05/2018 <1.6 0.0 - 19.9 GPL-U/mL Final     Comment:     Negative     Cardiolipin Antibody IgM   Date Value Ref Range Status   05/05/2018 0.5 0.0 - 19.9 MPL-U/mL Final     Comment:     Negative     Hepatitis B Core Neetu   Date Value Ref Range Status   12/10/2019 Nonreactive NR^Nonreactive Final     Hep B Surface Agn   Date Value Ref Range Status   12/10/2019 Nonreactive NR^Nonreactive Final     Albumin Fraction   Date Value Ref Range Status   09/30/2019 4.6 3.7 - 5.1 g/dL Final     Alpha 2 Fraction   Date Value Ref Range Status   09/30/2019 0.7 0.5 - 0.9 g/dL Final     Beta Fraction   Date Value Ref Range Status   09/30/2019 0.6 0.6 - 1.0 g/dL Final     Gamma Fraction   Date Value Ref Range Status   09/30/2019 1.3 0.7 - 1.6 g/dL Final     Monoclonal Peak   Date Value Ref Range Status   09/30/2019 0.4 (H) 0.0 g/dL Final     ELP Interpretation:   Date Value Ref Range Status   09/30/2019   Final    Monoclonal protein (about 0.4 g/dL) seen in the gamma fraction.  Previously characterized   in our laboratory on 5/19/15 as a monoclonal IgG immunoglobulin of kappa light chain type.   Pathologic significance requires clinical correlation.  MELITON Box M.D., Ph.D.,   Pathologist ().        Monoclonal Peak Urine   Date Value Ref Range Status   02/22/2017 0.0 0% % Final     Immunofixation ELP   Date Value Ref Range Status   05/19/2015   Final    (Note)  Monoclonal IgG immunoglobulin of kappa light chain type.  Pathological significance requires clinical correlation.  Beltran Pat M.D., Ph.D., Pathologist.         IGG    Date Value Ref Range Status   05/19/2015 1,380 695 - 1,620 mg/dL Final     IGA   Date Value Ref Range Status   05/19/2015 203 70 - 380 mg/dL Final     IGM   Date Value Ref Range Status   05/19/2015 108 60 - 265 mg/dL Final     Scribe Disclosure:  IFacundo, am serving as a scribe to document services personally performed by Markell Clemons MD at this visit, based upon the provider's statements to me. All documentation has been reviewed by the aforementioned provider prior to being entered into the official medical record.       Markell Clemons MD

## 2020-01-17 NOTE — PROGRESS NOTES
[facility]  Rheumatology Clinic  Markell Clemons MD  2020     Name: Murray Nicholson  MRN: [id number]  Age: 65 year old  : 1955  Referring provider: Established Patient    Assessment and Plan:  #     Blood work from 2020 showed creatinine as stable at 1.26; CBC showed mild leukopenia with white count of 3.8; stable mild anemia with hemoglobin of 11.5; urinalysis showed 5 white blood cells per high powered field. Uric acid level on 2019 was 3.9. Rheumatoid factor was negative in 2019. X-ray of the hands on 2019 showed mild scattered degenerative changes with osteopenic appearance of the bones and mild periostitis along the distal radius bilaterally.    ***    Orders:  There are no diagnoses linked to this encounter.   Follow-up: No follow-ups on file.    HPI:   Murray Nicholson is a 65 year old male with a history including coronary artery disease, end stage renal disease, type II diabetes, and polyarticular gout who presents for follow-up. The patient was last seen in Rheumatology by Dr. Tang on 2018, at which time no gout flares were noted on daily pyophylactic prednisone 5 mg and 40 mg allopurinol daily. The plan was to decrease daily allopurinol to 300 mg and to try prednisone 40 mg daily for 3 days for gout flares.    Today, the patient reports that he has lost 75 lbs due to his diet and medical issues including hospitalizations and fasting and has not had any gout flares. He states that he has had limited finger range of motion for around one year detailed as left thumb catching, inability to fully form fists, some tingling and stiffness in the fingers, pain to the left thumb with pressure, and diminished fine motor function in the fingers. He explains that a previous provider thought his issues to be related to carpal tunnel as he was having numbness, so he has worn nighttime wrist braces and was given stretching exercises which had not helped and were  discontinued some time ago. He notes that he did consult with a surgeon for his left thumb pain and he was given a cortisone injection into the base of the thumb. He describes his stiffness as constant and not more significant in the morning time. He voices that he has tool that he uses to open objects such as jars.     He explains that his hands constantly feel cold since his heart transplant.     He reports that he received a new heart on 06/14/2018 and later a kidney transplant on December 2019. He reports that his left shoulder is bothersome sometimes, so he stopped wearing his bag over that left shoulder. Otherwise, he denies any other issues.     He states that he has not been using allopurinol or prednisone recently.    The patient was seen by Dr. Jesus in solid organ transplant on January 6th in follow up of kidney transplant. Transplant history of diseased donor transplant on December 19th 2019 was reviewed. Graft function was judged stable. Immunosuppression with Prograf and CellCept was continued.      Review of Systems:   Pertinent items are noted in HPI or as below, remainder of complete ROS is negative.      No recent problems with hearing or vision. No swallowing problems.   No breathing difficulty, shortness of breath, coughing, or wheezing.  No chest pain or palpitations.  No heart burn, indigestion, abdominal pain, nausea, vomiting, diarrhea.  No urination problems, no bloody, cloudy urine, no dysuria.  No numbing, tingling, weakness.  No headaches or confusion.  No rashes. No easy bleeding or bruising.   ***    Active Medications:     Current Outpatient Medications:      acetaminophen (TYLENOL) 325 MG tablet, Take 2 tablets (650 mg) by mouth every 4 hours as needed for other (multimodal surgical pain management along with NSAIDS and opioid medication as indicated based on pain control and physical function.), Disp: 50 tablet, Rfl: 1     aspirin 81 MG EC tablet, Take 81 mg by mouth daily, Disp:  , Rfl:      atorvastatin (LIPITOR) 40 MG tablet, Take 1 tablet (40 mg) by mouth daily, Disp: 90 tablet, Rfl: 3     blood glucose (ACCU-CHEK GUIDE) test strip, Use to test blood sugar 2 times daily or as directed., Disp: 100 strip, Rfl: 3     blood glucose monitoring (ACCU-CHEK FASTCLIX) lancets, Use to test blood sugar 2 times daily or as directed., Disp: 100 each, Rfl: 1     calcitRIOL (ROCALTROL) 0.25 MCG capsule, Take 1 capsule (0.25 mcg) by mouth daily, Disp: 30 capsule, Rfl: 3     fluticasone (FLONASE) 50 MCG/ACT nasal spray, Spray 1 spray into both nostrils daily (Patient taking differently: Spray 1 spray into both nostrils daily as needed ), Disp: 11.1 mL, Rfl: 11     HYDROmorphone (DILAUDID) 2 MG tablet, Take 1 tablet (2 mg) by mouth every 4 hours as needed for moderate to severe pain, Disp: 20 tablet, Rfl: 0     insulin glargine (LANTUS PEN) 100 UNIT/ML pen, Inject 18 Units Subcutaneous every morning (Patient taking differently: Inject 5 Units Subcutaneous every morning ), Disp: 15 mL, Rfl: 1     magnesium oxide (MAG-OX) 400 MG tablet, Take 1 tablet (400 mg) by mouth daily (with lunch), Disp: 30 tablet, Rfl: 2     melatonin 1 MG TABS tablet, Take 2 tablets (2 mg) by mouth nightly as needed for sleep, Disp: 120 tablet, Rfl: 1     mupirocin (BACTROBAN) 2 % external ointment, Apply topically 3 times daily, Disp: 15 g, Rfl: 0     mycophenolate (GENERIC EQUIVALENT) 250 MG capsule, Take 3 capsules (750 mg) by mouth 2 times daily, Disp: 180 capsule, Rfl: 11     ondansetron (ZOFRAN-ODT) 4 MG ODT tab, Take 1 tablet (4 mg) by mouth every 6 hours as needed for nausea or vomiting, Disp: 10 tablet, Rfl: 1     pantoprazole (PROTONIX) 40 MG EC tablet, Take 40 mg by mouth daily, Disp: , Rfl:      polyethylene glycol (MIRALAX/GLYCOLAX) packet, Take 17 g by mouth daily, Disp: 10 packet, Rfl: 1     senna-docusate (SENOKOT-S/PERICOLACE) 8.6-50 MG tablet, Take 2 tablets by mouth 2 times daily, Disp: 50 tablet, Rfl: 1     sodium  bicarbonate 650 MG tablet, Take 1 tablet (650 mg) by mouth 2 times daily, Disp: 60 tablet, Rfl: 1     sulfamethoxazole-trimethoprim (BACTRIM/SEPTRA) 400-80 MG tablet, Take 1 tablet by mouth daily, Disp: 30 tablet, Rfl: 11     tacrolimus (GENERIC EQUIVALENT) 0.5 MG capsule, Take 1 capsule (0.5 mg) by mouth 2 times daily Total dose = 2.5 mg BID, Disp: 60 capsule, Rfl: 11     tacrolimus (GENERIC EQUIVALENT) 1 MG capsule, Take 2 capsules (2 mg) by mouth 2 times daily Total dose = 2.5 mg BID, Disp: 120 capsule, Rfl: 11     valACYclovir (VALTREX) 500 MG tablet, Take 3 tablets (1,500 mg) by mouth 2 times daily, Disp: 180 tablet, Rfl: 2     Vitamin D3 (CHOLECALCIFEROL) 25 mcg (1000 units) tablet, Take 1 tablet (25 mcg) by mouth daily, Disp: 30 tablet, Rfl: 1  No current facility-administered medications for this visit.     Facility-Administered Medications Ordered in Other Visits:      diatrizoate meglumine-sodium (GASTROGRAFIN/GASTROVIEW) 66-10 % solution 480 mL, 480 mL, Rectal, Once, Christopher Baker MD    Allergies:  Norco [Hydrocodone-Acetaminophen]   Cats   Isosorbide   Penicillins   Seasonal Allergies   Shrimp     Past Medical History:  Heart failure with preserved ejection fraction  Allergic rhinitis  Anemia of chronic kidney failure   Aortic stenosis   Ascending aortic aneurysm    Bicuspid aortic valve   Coronary artery disease   Congestive heart failure, unspecified   Dialysis patient   Dyslipidemia   Esophageal reflux   End stage renal disease  Hearing problem   Heart replaced by transplant  Hypersomnia with sleep apnea, unspecified   Hypertension   Ileostomy status   Immunosuppression    Monoclonal gammopathy of unknown significance  Mitral regurgitation   Obstructive sleep apnea  Pneumonia   Systolic heart failure  Type 2 diabetes mellitus     Past Surgical History:  Colonoscopy 5/3/2018  Create fistula arteriovenous upper extremity bovine, left 5/8/2019  Coronary angiogram 6/28/2019  Heart biopsy  "2/1/2019, 3/22/2019, 6/28/2019, 10/28/2019  Right heart cath 6/28/2019  Esophagoscopy, gastroscopy, duodenoscopy, combined 5/7/2018  Exam under anesthesia rectum 8/12/2018  Incision and drainage rectum, combined 8/12/2018  IR dialysis fistulogram left 9/25/2019, 11/22/2019  IR dialysis pta 9/25/2019, 11/22/2019  Laparoscopic assisted colostomy takedown 12/11/2018  Laparoscopic assisted sigmoid colectomy 8/14/2018  Laparoscopic herniorrhaphy inguinal bilateral, bilateral 7/24/2015  Laparoscopic insertion catheter peritoneal dialysis 6/22/2017  PICC insertion, left 04/22/2018  Remove catheter peritoneal, right 1/15/2018  Sigmoidoscopy flexible 11/21/2018, 12/11/2018  Takedown ileostomy 3/27/2019  Transplant heart recipient 6/14/2018    Family History:    The patient's family history includes C.A.D. in his father; Cerebrovascular Disease in his father; Diabetes in his father; Hypertension in his father.    Social History:  The patient reports that he quit smoking about 25 years ago. His smoking use included cigarettes. He started smoking about 36 years ago. He has a 20.00 pack-year smoking history. He has never used smokeless tobacco. He reports that he does not drink alcohol or use drugs.   PCP: Yahir Turcios  Marital Status:      Physical Exam:   /76   Pulse 95   Temp 97.5  F (36.4  C) (Oral)   Ht 1.746 m (5' 8.75\")   Wt 68.8 kg (151 lb 11.2 oz)   SpO2 100%   BMI 22.57 kg/m     Wt Readings from Last 4 Encounters:   01/17/20 68.8 kg (151 lb 11.2 oz)   01/06/20 69.6 kg (153 lb 8 oz)   12/26/19 71.6 kg (157 lb 13.6 oz)   12/23/19 69.5 kg (153 lb 3.5 oz)     Constitutional: Well-developed, appearing stated age; cooperative.  Eyes: Normal EOM, PERRLA, vision, conjunctiva, sclera.  ENT: Normal external ears, nose, hearing, lips, teeth, gums, throat. No mucous membrane lesions, normal saliva pool.  Neck: No mass or thyroid enlargement.  Resp: Lungs clear to auscultation, nl to palpation.  CV: RRR, no " murmurs, rubs or gallops, no edema.  GI: No ABD mass or tenderness, no HSM.  : Not tested.  Lymph: No cervical, supraclavicular, inguinal or epitrochlear nodes.  MS: The TMJ, neck, shoulder, elbow, spine, hip, knee, ankle, and foot MTP/IP joints were examined and found normal. Palpable nodule at the volar base of the left thumb with some triggering on flexion and extension, but no nodules at the bases of his other fingers. The right 3rd PIP lacks approximately 30 degrees of extension. Marked reduction in both PIP and MCP flexion in both hands in all 4 non-thumb digits. Reduced  strength. Toe range of motion is fluid and unrestricted.  Skin: No nail pitting, alopecia, rash, nodules, or lesions.  Neuro: Normal cranial nerves, strength, sensation, DTRs.   Psych: Normal judgement, orientation, memory, affect.     Laboratory:   RHEUM RESULTS Latest Ref Rng & Units 1/6/2020 1/9/2020 1/13/2020   SED RATE 0 - 20 mm/h - - -   CRP, INFLAMMATION 0.0 - 8.0 mg/L - - -   CK TOTAL 30 - 300 U/L - - -   RHEUMATOID FACTOR <20 IU/mL - - -   AST 0 - 45 U/L - - -   ALT 0 - 70 U/L - - -   ALBUMIN 3.4 - 5.0 g/dL - - -   WBC 4.0 - 11.0 10e9/L 2.8(L) 3.3(L) 3.8(L)   RBC 4.4 - 5.9 10e12/L 3.42(L) 3.37(L) 3.39(L)   HGB 13.3 - 17.7 g/dL 11.5(L) 11.6(L) 11.5(L)   HCT 40.0 - 53.0 % 34.4(L) 34.2(L) 34.3(L)   MCV 78 - 100 fl 101(H) 102(H) 101(H)   MCHC 31.5 - 36.5 g/dL 33.4 33.9 33.5   RDW 10.0 - 15.0 % 13.7 13.8 14.2    - 450 10e9/L 232 284 259   CREATININE 0.66 - 1.25 mg/dL 1.16 1.24 1.26(H)   GFR ESTIMATE, IF BLACK >60 mL/min/[1.73:m2] 76 70 69   GFR ESTIMATE >60 mL/min/[1.73:m2] 66 61 59(L)    - 1,620 mg/dL - - -   IGA 70 - 380 mg/dL - - -   IGM 60 - 265 mg/dL - - -   HEPATITIS C ANTIBODY NR:Nonreactive - - -     Rheumatoid Factor   Date Value Ref Range Status   09/30/2019 <20 <20 IU/mL Final     PATO interpretation   Date Value Ref Range Status   09/30/2019 Borderline Positive (A) NEG^Negative Final     Comment:                                         Reference range:  <1:40  NEGATIVE  1:40 - 1:80  BORDERLINE POSITIVE  >1:80 POSITIVE       PATO pattern 1   Date Value Ref Range Status   09/30/2019 SPECKLED  Final     PATO titer 1   Date Value Ref Range Status   09/30/2019 1:40  Final     Beta 2 Glycoprotein 1 Antibody IgG   Date Value Ref Range Status   05/05/2018 2.0 <7 U/mL Final     Comment:     Negative     Beta 2 Glycoprotein 1 Antibody IgM   Date Value Ref Range Status   05/05/2018 <0.9 <7 U/mL Final     Comment:     Negative     Cardiolipin Antibody IgG   Date Value Ref Range Status   05/05/2018 <1.6 0.0 - 19.9 GPL-U/mL Final     Comment:     Negative     Cardiolipin Antibody IgM   Date Value Ref Range Status   05/05/2018 0.5 0.0 - 19.9 MPL-U/mL Final     Comment:     Negative     Hepatitis B Core Neetu   Date Value Ref Range Status   12/10/2019 Nonreactive NR^Nonreactive Final     Hep B Surface Agn   Date Value Ref Range Status   12/10/2019 Nonreactive NR^Nonreactive Final     Albumin Fraction   Date Value Ref Range Status   09/30/2019 4.6 3.7 - 5.1 g/dL Final     Alpha 2 Fraction   Date Value Ref Range Status   09/30/2019 0.7 0.5 - 0.9 g/dL Final     Beta Fraction   Date Value Ref Range Status   09/30/2019 0.6 0.6 - 1.0 g/dL Final     Gamma Fraction   Date Value Ref Range Status   09/30/2019 1.3 0.7 - 1.6 g/dL Final     Monoclonal Peak   Date Value Ref Range Status   09/30/2019 0.4 (H) 0.0 g/dL Final     ELP Interpretation:   Date Value Ref Range Status   09/30/2019   Final    Monoclonal protein (about 0.4 g/dL) seen in the gamma fraction.  Previously characterized   in our laboratory on 5/19/15 as a monoclonal IgG immunoglobulin of kappa light chain type.   Pathologic significance requires clinical correlation.  MELITON Box M.D., Ph.D.,   Pathologist ().        Monoclonal Peak Urine   Date Value Ref Range Status   02/22/2017 0.0 0% % Final     Immunofixation ELP   Date Value Ref Range Status   05/19/2015   Final     (Note)  Monoclonal IgG immunoglobulin of kappa light chain type.  Pathological significance requires clinical correlation.  Beltran Pat M.D., Ph.D., Pathologist.         IGG   Date Value Ref Range Status   05/19/2015 1,380 695 - 1,620 mg/dL Final     IGA   Date Value Ref Range Status   05/19/2015 203 70 - 380 mg/dL Final     IGM   Date Value Ref Range Status   05/19/2015 108 60 - 265 mg/dL Final     Scribe Disclosure:  I, Facundo Womack, am serving as a scribe to document services personally performed by Markell Clemons MD at this visit, based upon the provider's statements to me. All documentation has been reviewed by the aforementioned provider prior to being entered into the official medical record.

## 2020-01-17 NOTE — NURSING NOTE
"Chief Complaint   Patient presents with     RECHECK     RA     /76   Pulse 95   Temp 97.5  F (36.4  C) (Oral)   Ht 1.746 m (5' 8.75\")   Wt 68.8 kg (151 lb 11.2 oz)   SpO2 100%   BMI 22.57 kg/m    Alycia Phillips [facility]  1/17/2020 10:39 AM    "

## 2020-01-17 NOTE — PATIENT INSTRUCTIONS
Diagnosis:  1. Reduced motion, knuckles and fingers  2. Trigger thumb  3. History of gout: now quiescent    Possible contirbution from long history of diabetes and/or kidney failure    Plan:  1. Continue stretching the hands/fingers.   2. Consult hand specialist regarding the left thumb.  3. Complete blood work for inflammatory markers and CCP antibody.

## 2020-01-17 NOTE — PROGRESS NOTES
[facility]  Rheumatology Clinic  Markell Clemons MD  2020     Name: Murray Nicholson  MRN: [id number]  Age: 65 year old  : 1955  Referring provider: Established Patient    Assessment and Plan:  # Chronic knuckle and finger stiffness:  Exam shows significant limitation in flexion as well as extension of multiple MCPs and PIPs of both hands. There is a trigger thumb on the left with a palpable nodule in the palm at the base orf the left thumb. No synovial thickening noted in hands, wrists, or small joints of the feet. Blood work from 2020 showed creatinine as stable at 1.26; CBC showed mild leukopenia with white count of 3.8; stable mild anemia with hemoglobin of 11.5; urinalysis showed 5 white blood cells per high powered field. Uric acid level on 2019 was 3.9. Rheumatoid factor was negative in 2019. X-ray of the hands on 2019 showed mild scattered degenerative changes with osteopenic appearance of the bones and mild periostitis along the distal radius bilaterally.    Chronic restricted range of motion in small joints of the hands could be due to prior metabolic disturbance related to longstanding diabetes and/or renal failure. I find no evidence of active inflammatory arthropathy, and x-rays show no evidence of joint space narrowing or erosive bony damage. Skin exam shows no evidence of sclerodactyly or bound-down skin that might account for joint motion limitation. I recommend completing work up for inflammatory arthropathy with CCP antibody, repeat sedimentation rate and CRP, and parathyroid hormone. However, I doubt that patient would benefit from immunotherapy. I think, rather, that physical therapeutic methods including vigilant attention to range of motion exercises for the MCPs and PIPs, are of paramount importance. Patient is already scheduled for hand surgery follow up to address ongoing trigger thumb symptoms on the left. I will contact the patient with results  of planned laboratory tests and will arrange follow up as needed.    # Non crystal proven gout: no current symptoms or exam signs of tophi or inflammatory joint disease. 12-19 uric acid is normal at 3.5. I do not think that crystalline arthropathy is the cause of the symptoms discussed above. No urate lowering therapy is necessary.    Orders:  - Cyclic Citrullinated Peptide Antibody IgG  - CRP inflammation  - Erythrocyte sedimentation rate auto  - Parathyroid Hormone Intact     Follow-up: Return if symptoms worsen or fail to improve.    HPI:   Murray Nicholson is a 65 year old male with a history including coronary artery disease, end stage renal disease, type II diabetes, and polyarticular gout who presents for follow-up. The patient was last seen in Rheumatology by Dr. Tang on 03/16/2018, at which time no gout flares were noted on daily pyophylactic prednisone 5 mg and 40 mg allopurinol daily. The plan was to decrease daily allopurinol to 300 mg and to try prednisone 40 mg daily for 3 days for gout flares.    Today, the patient reports that he has lost 75 lbs due to his diet and medical issues including hospitalizations and fasting and has not had any gout flares. He states that he has had limited finger range of motion for around one year detailed as left thumb catching, inability to fully form fists, some tingling and stiffness in the fingers, pain to the left thumb with pressure, and diminished fine motor function in the fingers. He explains that a previous provider thought his issues to be related to carpal tunnel as he was having numbness, so he has worn nighttime wrist braces and was given stretching exercises which had not helped and were discontinued some time ago. He notes that he did consult with a surgeon for his left thumb pain and he was given a cortisone injection into the base of the thumb. He describes his stiffness as constant and not more significant in the morning time. He voices that he has tool  that he uses to open objects such as jars.     He explains that his hands constantly feel cold since his heart transplant.     He reports that he received a new heart on 06/14/2018 and later a kidney transplant on December 2019. He reports that his left shoulder is bothersome sometimes, so he stopped wearing his bag over that left shoulder. Otherwise, he denies any other issues.     He states that he has not been using allopurinol or prednisone recently.    The patient was seen by Dr. Jesus in solid organ transplant on January 6th in follow up of kidney transplant. Transplant history of diseased donor transplant on December 19th 2019 was reviewed. Graft function was judged stable. Immunosuppression with Prograf and CellCept was continued.      Review of Systems:   Pertinent items are noted in HPI or as below, remainder of complete ROS is negative.      No recent problems with hearing or vision. No swallowing problems.   No breathing difficulty, shortness of breath, coughing, or wheezing.  No chest pain or palpitations.  No heart burn, indigestion, abdominal pain, nausea, vomiting, diarrhea.  No urination problems, no bloody, cloudy urine, no dysuria.  No numbing, tingling, weakness.  No headaches or confusion.  No rashes. No easy bleeding or bruising.     Active Medications:   Current Outpatient Medications:      acetaminophen (TYLENOL) 325 MG tablet, Take 2 tablets (650 mg) by mouth every 4 hours as needed for other (multimodal surgical pain management along with NSAIDS and opioid medication as indicated based on pain control and physical function.), Disp: 50 tablet, Rfl: 1     aspirin 81 MG EC tablet, Take 81 mg by mouth daily, Disp: , Rfl:      atorvastatin (LIPITOR) 40 MG tablet, Take 1 tablet (40 mg) by mouth daily, Disp: 90 tablet, Rfl: 3     blood glucose (ACCU-CHEK GUIDE) test strip, Use to test blood sugar 2 times daily or as directed., Disp: 100 strip, Rfl: 3     blood glucose monitoring (ACCU-CHEK  FASTCLIX) lancets, Use to test blood sugar 2 times daily or as directed., Disp: 100 each, Rfl: 1     calcitRIOL (ROCALTROL) 0.25 MCG capsule, Take 1 capsule (0.25 mcg) by mouth daily, Disp: 30 capsule, Rfl: 3     fluticasone (FLONASE) 50 MCG/ACT nasal spray, Spray 1 spray into both nostrils daily (Patient taking differently: Spray 1 spray into both nostrils daily as needed ), Disp: 11.1 mL, Rfl: 11     HYDROmorphone (DILAUDID) 2 MG tablet, Take 1 tablet (2 mg) by mouth every 4 hours as needed for moderate to severe pain, Disp: 20 tablet, Rfl: 0     insulin glargine (LANTUS PEN) 100 UNIT/ML pen, Inject 18 Units Subcutaneous every morning (Patient taking differently: Inject 5 Units Subcutaneous every morning ), Disp: 15 mL, Rfl: 1     magnesium oxide (MAG-OX) 400 MG tablet, Take 1 tablet (400 mg) by mouth daily (with lunch), Disp: 30 tablet, Rfl: 2     melatonin 1 MG TABS tablet, Take 2 tablets (2 mg) by mouth nightly as needed for sleep, Disp: 120 tablet, Rfl: 1     mupirocin (BACTROBAN) 2 % external ointment, Apply topically 3 times daily, Disp: 15 g, Rfl: 0     mycophenolate (GENERIC EQUIVALENT) 250 MG capsule, Take 3 capsules (750 mg) by mouth 2 times daily, Disp: 180 capsule, Rfl: 11     ondansetron (ZOFRAN-ODT) 4 MG ODT tab, Take 1 tablet (4 mg) by mouth every 6 hours as needed for nausea or vomiting, Disp: 10 tablet, Rfl: 1     pantoprazole (PROTONIX) 40 MG EC tablet, Take 40 mg by mouth daily, Disp: , Rfl:      polyethylene glycol (MIRALAX/GLYCOLAX) packet, Take 17 g by mouth daily, Disp: 10 packet, Rfl: 1     senna-docusate (SENOKOT-S/PERICOLACE) 8.6-50 MG tablet, Take 2 tablets by mouth 2 times daily, Disp: 50 tablet, Rfl: 1     sodium bicarbonate 650 MG tablet, Take 1 tablet (650 mg) by mouth 2 times daily, Disp: 60 tablet, Rfl: 1     sulfamethoxazole-trimethoprim (BACTRIM/SEPTRA) 400-80 MG tablet, Take 1 tablet by mouth daily, Disp: 30 tablet, Rfl: 11     tacrolimus (GENERIC EQUIVALENT) 0.5 MG capsule,  Take 1 capsule (0.5 mg) by mouth 2 times daily Total dose = 2.5 mg BID, Disp: 60 capsule, Rfl: 11     tacrolimus (GENERIC EQUIVALENT) 1 MG capsule, Take 2 capsules (2 mg) by mouth 2 times daily Total dose = 2.5 mg BID, Disp: 120 capsule, Rfl: 11     valACYclovir (VALTREX) 500 MG tablet, Take 3 tablets (1,500 mg) by mouth 2 times daily, Disp: 180 tablet, Rfl: 2     Vitamin D3 (CHOLECALCIFEROL) 25 mcg (1000 units) tablet, Take 1 tablet (25 mcg) by mouth daily, Disp: 30 tablet, Rfl: 1  No current facility-administered medications for this visit.     Facility-Administered Medications Ordered in Other Visits:      diatrizoate meglumine-sodium (GASTROGRAFIN/GASTROVIEW) 66-10 % solution 480 mL, 480 mL, Rectal, Once, Christopher Baker MD    Allergies:  Norco [Hydrocodone-Acetaminophen]   Cats   Isosorbide   Penicillins   Seasonal Allergies   Shrimp     Past Medical History:  Heart failure with preserved ejection fraction  Allergic rhinitis  Anemia of chronic kidney failure   Aortic stenosis   Ascending aortic aneurysm    Bicuspid aortic valve   Coronary artery disease   Congestive heart failure, unspecified   Dialysis patient   Dyslipidemia   Esophageal reflux   End stage renal disease  Hearing problem   Heart replaced by transplant  Hypersomnia with sleep apnea, unspecified   Hypertension   Ileostomy status   Immunosuppression    Monoclonal gammopathy of unknown significance  Mitral regurgitation   Obstructive sleep apnea  Pneumonia   Systolic heart failure  Type 2 diabetes mellitus     Past Surgical History:  Colonoscopy 5/3/2018  Create fistula arteriovenous upper extremity bovine, left 5/8/2019  Coronary angiogram 6/28/2019  Heart biopsy 2/1/2019, 3/22/2019, 6/28/2019, 10/28/2019  Right heart cath 6/28/2019  Esophagoscopy, gastroscopy, duodenoscopy, combined 5/7/2018  Exam under anesthesia rectum 8/12/2018  Incision and drainage rectum, combined 8/12/2018  IR dialysis fistulogram left 9/25/2019, 11/22/2019  IR  "dialysis pta 9/25/2019, 11/22/2019  Laparoscopic assisted colostomy takedown 12/11/2018  Laparoscopic assisted sigmoid colectomy 8/14/2018  Laparoscopic herniorrhaphy inguinal bilateral, bilateral 7/24/2015  Laparoscopic insertion catheter peritoneal dialysis 6/22/2017  PICC insertion, left 04/22/2018  Remove catheter peritoneal, right 1/15/2018  Sigmoidoscopy flexible 11/21/2018, 12/11/2018  Takedown ileostomy 3/27/2019  Transplant heart recipient 6/14/2018    Family History:    The patient's family history includes C.A.D. in his father; Cerebrovascular Disease in his father; Diabetes in his father; Hypertension in his father.    Social History:  The patient reports that he quit smoking about 25 years ago. His smoking use included cigarettes. He started smoking about 36 years ago. He has a 20.00 pack-year smoking history. He has never used smokeless tobacco. He reports that he does not drink alcohol or use drugs.   PCP: Yahir Turcios  Marital Status:      Physical Exam:   /76   Pulse 95   Temp 97.5  F (36.4  C) (Oral)   Ht 1.746 m (5' 8.75\")   Wt 68.8 kg (151 lb 11.2 oz)   SpO2 100%   BMI 22.57 kg/m     Wt Readings from Last 4 Encounters:   01/17/20 68.8 kg (151 lb 11.2 oz)   01/06/20 69.6 kg (153 lb 8 oz)   12/26/19 71.6 kg (157 lb 13.6 oz)   12/23/19 69.5 kg (153 lb 3.5 oz)     Constitutional: Well-developed, appearing stated age; cooperative.  Eyes: Normal EOM, PERRLA, vision, conjunctiva, sclera.  ENT: Normal external ears, nose, hearing, lips, teeth, gums, throat. No mucous membrane lesions, normal saliva pool.  Neck: No mass or thyroid enlargement.  Resp: Lungs clear to auscultation, nl to palpation.  CV: RRR, no murmurs, rubs or gallops, no edema.  GI: No ABD mass or tenderness, no HSM.  : Not tested.  Lymph: No cervical, supraclavicular, inguinal or epitrochlear nodes.  MS: The TMJ, neck, shoulder, elbow, spine, hip, knee, ankle, and foot MTP/IP joints were examined and found " normal. Palpable nodule at the volar base of the left thumb with some triggering on flexion and extension, but no nodules at the bases of his other fingers. The right 3rd PIP lacks approximately 30 degrees of extension. Marked reduction in both PIP and MCP flexion in both hands in all 4 non-thumb digits. Reduced  strength. Toe range of motion is fluid and unrestricted.  Skin: No nail pitting, alopecia, rash, nodules, or lesions.  Neuro: Normal cranial nerves, strength, sensation, DTRs.   Psych: Normal judgement, orientation, memory, affect.     Laboratory:   RHEUM RESULTS Latest Ref Rng & Units 1/6/2020 1/9/2020 1/13/2020   SED RATE 0 - 20 mm/h - - -   CRP, INFLAMMATION 0.0 - 8.0 mg/L - - -   CK TOTAL 30 - 300 U/L - - -   RHEUMATOID FACTOR <20 IU/mL - - -   AST 0 - 45 U/L - - -   ALT 0 - 70 U/L - - -   ALBUMIN 3.4 - 5.0 g/dL - - -   WBC 4.0 - 11.0 10e9/L 2.8(L) 3.3(L) 3.8(L)   RBC 4.4 - 5.9 10e12/L 3.42(L) 3.37(L) 3.39(L)   HGB 13.3 - 17.7 g/dL 11.5(L) 11.6(L) 11.5(L)   HCT 40.0 - 53.0 % 34.4(L) 34.2(L) 34.3(L)   MCV 78 - 100 fl 101(H) 102(H) 101(H)   MCHC 31.5 - 36.5 g/dL 33.4 33.9 33.5   RDW 10.0 - 15.0 % 13.7 13.8 14.2    - 450 10e9/L 232 284 259   CREATININE 0.66 - 1.25 mg/dL 1.16 1.24 1.26(H)   GFR ESTIMATE, IF BLACK >60 mL/min/[1.73:m2] 76 70 69   GFR ESTIMATE >60 mL/min/[1.73:m2] 66 61 59(L)    - 1,620 mg/dL - - -   IGA 70 - 380 mg/dL - - -   IGM 60 - 265 mg/dL - - -   HEPATITIS C ANTIBODY NR:Nonreactive - - -     Rheumatoid Factor   Date Value Ref Range Status   09/30/2019 <20 <20 IU/mL Final     PATO interpretation   Date Value Ref Range Status   09/30/2019 Borderline Positive (A) NEG^Negative Final     Comment:                                        Reference range:  <1:40  NEGATIVE  1:40 - 1:80  BORDERLINE POSITIVE  >1:80 POSITIVE       PATO pattern 1   Date Value Ref Range Status   09/30/2019 SPECKLED  Final     PATO titer 1   Date Value Ref Range Status   09/30/2019 1:40  Final     Beta 2  Glycoprotein 1 Antibody IgG   Date Value Ref Range Status   05/05/2018 2.0 <7 U/mL Final     Comment:     Negative     Beta 2 Glycoprotein 1 Antibody IgM   Date Value Ref Range Status   05/05/2018 <0.9 <7 U/mL Final     Comment:     Negative     Cardiolipin Antibody IgG   Date Value Ref Range Status   05/05/2018 <1.6 0.0 - 19.9 GPL-U/mL Final     Comment:     Negative     Cardiolipin Antibody IgM   Date Value Ref Range Status   05/05/2018 0.5 0.0 - 19.9 MPL-U/mL Final     Comment:     Negative     Hepatitis B Core Neetu   Date Value Ref Range Status   12/10/2019 Nonreactive NR^Nonreactive Final     Hep B Surface Agn   Date Value Ref Range Status   12/10/2019 Nonreactive NR^Nonreactive Final     Albumin Fraction   Date Value Ref Range Status   09/30/2019 4.6 3.7 - 5.1 g/dL Final     Alpha 2 Fraction   Date Value Ref Range Status   09/30/2019 0.7 0.5 - 0.9 g/dL Final     Beta Fraction   Date Value Ref Range Status   09/30/2019 0.6 0.6 - 1.0 g/dL Final     Gamma Fraction   Date Value Ref Range Status   09/30/2019 1.3 0.7 - 1.6 g/dL Final     Monoclonal Peak   Date Value Ref Range Status   09/30/2019 0.4 (H) 0.0 g/dL Final     ELP Interpretation:   Date Value Ref Range Status   09/30/2019   Final    Monoclonal protein (about 0.4 g/dL) seen in the gamma fraction.  Previously characterized   in our laboratory on 5/19/15 as a monoclonal IgG immunoglobulin of kappa light chain type.   Pathologic significance requires clinical correlation.  MELITON Box M.D., Ph.D.,   Pathologist ().        Monoclonal Peak Urine   Date Value Ref Range Status   02/22/2017 0.0 0% % Final     Immunofixation ELP   Date Value Ref Range Status   05/19/2015   Final    (Note)  Monoclonal IgG immunoglobulin of kappa light chain type.  Pathological significance requires clinical correlation.  Beltran Pat M.D., Ph.D., Pathologist.         IGG   Date Value Ref Range Status   05/19/2015 1,380 695 - 1,620 mg/dL Final     IGA   Date  Value Ref Range Status   05/19/2015 203 70 - 380 mg/dL Final     IGM   Date Value Ref Range Status   05/19/2015 108 60 - 265 mg/dL Final     Scribe Disclosure:  I, Facundo Womack, am serving as a scribe to document services personally performed by Markell Clemons MD at this visit, based upon the provider's statements to me. All documentation has been reviewed by the aforementioned provider prior to being entered into the official medical record.

## 2020-01-20 ENCOUNTER — MYC MEDICAL ADVICE (OUTPATIENT)
Dept: TRANSPLANT | Facility: CLINIC | Age: 65
End: 2020-01-20

## 2020-01-20 DIAGNOSIS — M25.649 FINGER STIFFNESS, UNSPECIFIED LATERALITY: ICD-10-CM

## 2020-01-20 DIAGNOSIS — Z94.0 KIDNEY REPLACED BY TRANSPLANT: ICD-10-CM

## 2020-01-20 DIAGNOSIS — Z79.899 LONG TERM USE OF DRUG: ICD-10-CM

## 2020-01-20 LAB
ANION GAP SERPL CALCULATED.3IONS-SCNC: 10 MMOL/L (ref 3–14)
BUN SERPL-MCNC: 18 MG/DL (ref 7–30)
CALCIUM SERPL-MCNC: 8.8 MG/DL (ref 8.5–10.1)
CHLORIDE SERPL-SCNC: 110 MMOL/L (ref 94–109)
CO2 SERPL-SCNC: 19 MMOL/L (ref 20–32)
CREAT SERPL-MCNC: 1.06 MG/DL (ref 0.66–1.25)
CRP SERPL-MCNC: <2.9 MG/L (ref 0–8)
ERYTHROCYTE [DISTWIDTH] IN BLOOD BY AUTOMATED COUNT: 14.9 % (ref 10–15)
ERYTHROCYTE [SEDIMENTATION RATE] IN BLOOD BY WESTERGREN METHOD: 28 MM/H (ref 0–20)
GFR SERPL CREATININE-BSD FRML MDRD: 73 ML/MIN/{1.73_M2}
GLUCOSE SERPL-MCNC: 176 MG/DL (ref 70–99)
HCT VFR BLD AUTO: 35.2 % (ref 40–53)
HGB BLD-MCNC: 11.8 G/DL (ref 13.3–17.7)
MCH RBC QN AUTO: 34.1 PG (ref 26.5–33)
MCHC RBC AUTO-ENTMCNC: 33.5 G/DL (ref 31.5–36.5)
MCV RBC AUTO: 102 FL (ref 78–100)
PLATELET # BLD AUTO: 236 10E9/L (ref 150–450)
POTASSIUM SERPL-SCNC: 4.1 MMOL/L (ref 3.4–5.3)
PTH-INTACT SERPL-MCNC: 126 PG/ML (ref 18–80)
RBC # BLD AUTO: 3.46 10E12/L (ref 4.4–5.9)
SODIUM SERPL-SCNC: 139 MMOL/L (ref 133–144)
WBC # BLD AUTO: 3.4 10E9/L (ref 4–11)

## 2020-01-20 PROCEDURE — 85027 COMPLETE CBC AUTOMATED: CPT | Performed by: FAMILY MEDICINE

## 2020-01-20 PROCEDURE — 80197 ASSAY OF TACROLIMUS: CPT | Performed by: INTERNAL MEDICINE

## 2020-01-20 PROCEDURE — 36415 COLL VENOUS BLD VENIPUNCTURE: CPT | Performed by: INTERNAL MEDICINE

## 2020-01-20 PROCEDURE — 83970 ASSAY OF PARATHORMONE: CPT | Performed by: INTERNAL MEDICINE

## 2020-01-20 PROCEDURE — 80048 BASIC METABOLIC PNL TOTAL CA: CPT | Performed by: INTERNAL MEDICINE

## 2020-01-20 PROCEDURE — 86200 CCP ANTIBODY: CPT | Performed by: INTERNAL MEDICINE

## 2020-01-20 PROCEDURE — 86140 C-REACTIVE PROTEIN: CPT | Performed by: INTERNAL MEDICINE

## 2020-01-20 PROCEDURE — 85652 RBC SED RATE AUTOMATED: CPT | Performed by: FAMILY MEDICINE

## 2020-01-20 RX ORDER — VALACYCLOVIR HYDROCHLORIDE 500 MG/1
1500 TABLET, FILM COATED ORAL 2 TIMES DAILY
Qty: 180 TABLET | Refills: 2 | Status: SHIPPED | OUTPATIENT
Start: 2020-01-20 | End: 2020-03-24

## 2020-01-20 RX ORDER — SULFAMETHOXAZOLE AND TRIMETHOPRIM 400; 80 MG/1; MG/1
1 TABLET ORAL DAILY
Qty: 30 TABLET | Refills: 11 | Status: SHIPPED | OUTPATIENT
Start: 2020-01-20 | End: 2021-02-12

## 2020-01-21 DIAGNOSIS — Z94.0 KIDNEY REPLACED BY TRANSPLANT: Primary | ICD-10-CM

## 2020-01-21 DIAGNOSIS — E87.20 METABOLIC ACIDOSIS: ICD-10-CM

## 2020-01-21 LAB
CCP AB SER IA-ACNC: 1 U/ML
TACROLIMUS BLD-MCNC: 7.7 UG/L (ref 5–15)
TME LAST DOSE: 21.1 H

## 2020-01-21 RX ORDER — TACROLIMUS 0.5 MG/1
CAPSULE ORAL
Qty: 60 CAPSULE | Refills: 11
Start: 2020-01-21 | End: 2020-01-30

## 2020-01-21 RX ORDER — TACROLIMUS 1 MG/1
3 CAPSULE ORAL 2 TIMES DAILY
Qty: 180 CAPSULE | Refills: 11 | Status: SHIPPED | OUTPATIENT
Start: 2020-01-21 | End: 2020-01-30

## 2020-01-21 RX ORDER — SODIUM BICARBONATE 650 MG/1
1300 TABLET ORAL 2 TIMES DAILY
Qty: 120 TABLET | Refills: 2 | Status: SHIPPED | OUTPATIENT
Start: 2020-01-21 | End: 2020-03-26

## 2020-01-21 NOTE — TELEPHONE ENCOUNTER
ISSUE:   Tacrolimus IR level 7.7 on 1/20/2020, goal 8-10, dose 2.5 mg BID.    Carbon dioxide level low = 19.    PLAN:   Please call patient and confirm this was an accurate 12-hour trough. Verify Tacrolimus IR dose 2.5 mg BID. Confirm no new medications or illness. Confirm no missed doses. If accurate trough and accurate dose, increase Tacrolimus IR dose to 3 mg BID and repeat labs as scheduled.    Ensure Murray has received and started his sodium bicarbonate supplement, 1 tab two times daily.  If so, INCREASE to 2 tabs (1,300mg) twice daily.    Cami Villarreal RN      OUTCOME:   Spoke with patient, they confirm accurate trough level and current dose 2.5 mg BID. Patient confirmed dose change to 3 mg BID and to repeat labs in 2 days. Orders sent to preferred pharmacy for dose change and lab for repeat labs. Patient voiced understanding of plan.

## 2020-01-21 NOTE — TELEPHONE ENCOUNTER
Sent Native message to patient, unable to leave message on phone:  Tacrolimus IR level 7.7 on 1/20/2020, goal 8-10, dose 2.5 mg BID.     Carbon dioxide level low = 19.     PLAN:   Please call patient and confirm this was an accurate 12-hour trough. Verify Tacrolimus IR dose 2.5 mg BID. Confirm no new medications or illness. Confirm no missed doses. If accurate trough and accurate dose, increase Tacrolimus IR dose to 3 mg BID and repeat labs as scheduled.     Ensure Murray has received and started his sodium bicarbonate supplement, 1 tab two times daily.  If so, INCREASE to 2 tabs (1,300mg) twice daily.

## 2020-01-22 ENCOUNTER — TELEPHONE (OUTPATIENT)
Dept: TRANSPLANT | Facility: CLINIC | Age: 65
End: 2020-01-22

## 2020-01-22 NOTE — TELEPHONE ENCOUNTER
Post Kidney and Pancreas Transplant Team Conference  Date: 1/22/2020  Transplant Coordinator: Lillie Ulloa, Cami Villarreal     Attendees:  [x]  Dr. Quijano  [x] Patt Johns LPN     [x]  Dr. Ulloa [x] Edith Ward, TONY [] Donna Wheat LPN   []  Dr. Escobar [] Marielena Romero RN     [] Radha Gray RN [x] Eduardo Lea, EllisD   [] Dr. Ayala [] Jennifer Villarreal RN    [] Dr. Cheney [] Fantasma Dorsey RN    [] Dr. Chase [] Autumn Mendez RN    [] Dr. Cowan [] Kari Carr, TONY     [] Anastasia Tanner RN    [] Surgery Fellow [x] Alma Rosa Monique RN    [] Maliha Jesus NP [] Scarlet Haines RN        Verbal Plan Read Back:   No changes at this time    Routed to RN Coordinator   Patt Johns LPN

## 2020-01-23 DIAGNOSIS — Z94.0 KIDNEY REPLACED BY TRANSPLANT: ICD-10-CM

## 2020-01-23 DIAGNOSIS — Z79.899 LONG TERM USE OF DRUG: ICD-10-CM

## 2020-01-23 LAB
TACROLIMUS BLD-MCNC: 9.6 UG/L (ref 5–15)
TME LAST DOSE: NORMAL H

## 2020-01-23 PROCEDURE — 80197 ASSAY OF TACROLIMUS: CPT | Performed by: INTERNAL MEDICINE

## 2020-01-23 PROCEDURE — 36415 COLL VENOUS BLD VENIPUNCTURE: CPT | Performed by: INTERNAL MEDICINE

## 2020-01-27 ENCOUNTER — PRE VISIT (OUTPATIENT)
Dept: TRANSPLANT | Facility: CLINIC | Age: 65
End: 2020-01-27

## 2020-01-27 ENCOUNTER — RESULTS ONLY (OUTPATIENT)
Dept: OTHER | Facility: CLINIC | Age: 65
End: 2020-01-27

## 2020-01-27 ENCOUNTER — OFFICE VISIT (OUTPATIENT)
Dept: TRANSPLANT | Facility: CLINIC | Age: 65
End: 2020-01-27
Attending: SURGERY
Payer: MEDICARE

## 2020-01-27 VITALS
OXYGEN SATURATION: 99 % | DIASTOLIC BLOOD PRESSURE: 92 MMHG | BODY MASS INDEX: 23.21 KG/M2 | SYSTOLIC BLOOD PRESSURE: 155 MMHG | WEIGHT: 156 LBS | HEART RATE: 88 BPM | TEMPERATURE: 97.7 F

## 2020-01-27 DIAGNOSIS — Z79.899 LONG TERM USE OF DRUG: ICD-10-CM

## 2020-01-27 DIAGNOSIS — Z96.0 URETERAL STENT RETAINED: ICD-10-CM

## 2020-01-27 DIAGNOSIS — Z94.1 HEART REPLACED BY TRANSPLANT (H): Primary | ICD-10-CM

## 2020-01-27 DIAGNOSIS — Z46.6 ENCOUNTER FOR REMOVAL OF URETERAL STENT: Primary | ICD-10-CM

## 2020-01-27 DIAGNOSIS — Z94.0 KIDNEY REPLACED BY TRANSPLANT: ICD-10-CM

## 2020-01-27 DIAGNOSIS — E11.9 DIABETES MELLITUS (H): ICD-10-CM

## 2020-01-27 LAB
ANION GAP SERPL CALCULATED.3IONS-SCNC: 7 MMOL/L (ref 3–14)
BUN SERPL-MCNC: 18 MG/DL (ref 7–30)
CALCIUM SERPL-MCNC: 8.8 MG/DL (ref 8.5–10.1)
CHLORIDE SERPL-SCNC: 105 MMOL/L (ref 94–109)
CO2 SERPL-SCNC: 24 MMOL/L (ref 20–32)
CREAT SERPL-MCNC: 1.07 MG/DL (ref 0.66–1.25)
CREAT UR-MCNC: 55 MG/DL
ERYTHROCYTE [DISTWIDTH] IN BLOOD BY AUTOMATED COUNT: 15.8 % (ref 10–15)
GFR SERPL CREATININE-BSD FRML MDRD: 72 ML/MIN/{1.73_M2}
GLUCOSE SERPL-MCNC: 171 MG/DL (ref 70–99)
HCT VFR BLD AUTO: 34.8 % (ref 40–53)
HGB BLD-MCNC: 11.5 G/DL (ref 13.3–17.7)
MAGNESIUM SERPL-MCNC: 1.6 MG/DL (ref 1.6–2.3)
MCH RBC QN AUTO: 33.7 PG (ref 26.5–33)
MCHC RBC AUTO-ENTMCNC: 33 G/DL (ref 31.5–36.5)
MCV RBC AUTO: 102 FL (ref 78–100)
PHOSPHATE SERPL-MCNC: 4.2 MG/DL (ref 2.5–4.5)
PLATELET # BLD AUTO: 168 10E9/L (ref 150–450)
POTASSIUM SERPL-SCNC: 3.8 MMOL/L (ref 3.4–5.3)
PROT UR-MCNC: 0.21 G/L
PROT/CREAT 24H UR: 0.38 G/G CR (ref 0–0.2)
RBC # BLD AUTO: 3.41 10E12/L (ref 4.4–5.9)
SODIUM SERPL-SCNC: 136 MMOL/L (ref 133–144)
TACROLIMUS BLD-MCNC: 10.7 UG/L (ref 5–15)
TME LAST DOSE: NORMAL H
WBC # BLD AUTO: 3.6 10E9/L (ref 4–11)

## 2020-01-27 PROCEDURE — 80048 BASIC METABOLIC PNL TOTAL CA: CPT | Performed by: INTERNAL MEDICINE

## 2020-01-27 PROCEDURE — 86832 HLA CLASS I HIGH DEFIN QUAL: CPT | Performed by: INTERNAL MEDICINE

## 2020-01-27 PROCEDURE — 80180 DRUG SCRN QUAN MYCOPHENOLATE: CPT | Performed by: INTERNAL MEDICINE

## 2020-01-27 PROCEDURE — 86833 HLA CLASS II HIGH DEFIN QUAL: CPT | Performed by: INTERNAL MEDICINE

## 2020-01-27 PROCEDURE — 84100 ASSAY OF PHOSPHORUS: CPT | Performed by: INTERNAL MEDICINE

## 2020-01-27 PROCEDURE — 52310 CYSTOSCOPY AND TREATMENT: CPT | Mod: ZF | Performed by: NURSE PRACTITIONER

## 2020-01-27 PROCEDURE — 85027 COMPLETE CBC AUTOMATED: CPT | Performed by: INTERNAL MEDICINE

## 2020-01-27 PROCEDURE — 83735 ASSAY OF MAGNESIUM: CPT | Performed by: INTERNAL MEDICINE

## 2020-01-27 PROCEDURE — 86828 HLA CLASS I&II ANTIBODY QUAL: CPT | Mod: XU | Performed by: INTERNAL MEDICINE

## 2020-01-27 PROCEDURE — 25000125 ZZHC RX 250: Mod: ZF | Performed by: NURSE PRACTITIONER

## 2020-01-27 PROCEDURE — 36415 COLL VENOUS BLD VENIPUNCTURE: CPT | Performed by: INTERNAL MEDICINE

## 2020-01-27 PROCEDURE — 87799 DETECT AGENT NOS DNA QUANT: CPT | Performed by: INTERNAL MEDICINE

## 2020-01-27 PROCEDURE — 84156 ASSAY OF PROTEIN URINE: CPT | Performed by: INTERNAL MEDICINE

## 2020-01-27 PROCEDURE — 80197 ASSAY OF TACROLIMUS: CPT | Performed by: INTERNAL MEDICINE

## 2020-01-27 PROCEDURE — 40001063 ZZHCL STATISTIC ALLOSURE: Performed by: INTERNAL MEDICINE

## 2020-01-27 PROCEDURE — 25000132 ZZH RX MED GY IP 250 OP 250 PS 637: Mod: GY,ZF | Performed by: NURSE PRACTITIONER

## 2020-01-27 RX ORDER — LIDOCAINE 40 MG/G
CREAM TOPICAL
Status: CANCELLED | OUTPATIENT
Start: 2020-01-27

## 2020-01-27 RX ORDER — LIDOCAINE HYDROCHLORIDE 20 MG/ML
JELLY TOPICAL ONCE
Status: COMPLETED | OUTPATIENT
Start: 2020-01-27 | End: 2020-01-27

## 2020-01-27 RX ORDER — LEVOFLOXACIN 250 MG/1
500 TABLET, FILM COATED ORAL ONCE
Status: COMPLETED | OUTPATIENT
Start: 2020-01-27 | End: 2020-01-27

## 2020-01-27 RX ADMIN — LEVOFLOXACIN 500 MG: 250 TABLET, FILM COATED ORAL at 11:26

## 2020-01-27 RX ADMIN — LIDOCAINE HYDROCHLORIDE: 20 JELLY TOPICAL at 11:25

## 2020-01-27 ASSESSMENT — PAIN SCALES - GENERAL: PAINLEVEL: NO PAIN (0)

## 2020-01-27 NOTE — PROCEDURES
Transplant Surgery  Operative Note    Preop dx: Status post Living Unrelated Donor kidney transplant.  Post op dx: same   Procedure: Flexible cystoscopy and ureteral stent removal   Surgeon: Maliha Jesus CNP  Assistant:  Roxana Urbina MA  Anesthesia: local  EBL: 0   Specimens: none.  Findings: none abnormal. Stent inspected and noted to be intact.   Indication: The patient is status post kidney transplant and the ureteral stent is no longer needed.    Procedure: The patient was positioned supine on the table.  The groin was sterilely prepped and draped in the usual fashion. Time out was done. Urojet was applied to the urethra. A flexible cystoscope was inserted and advanced thru the urethra into the bladder. The stent was visualized and grasped. The cystoscope, grasper and stent were removed en-mass. The stent was visualized to be intact.  The bladder mucosa was normal in appearance. Antibiotics were administered. The patient tolerated the procedure well and was asked to void before leaving the clinic.

## 2020-01-27 NOTE — LETTER
1/27/2020       RE: Murray Nicholson  [address]  [place] [address]     Dear Colleague,    Thank you for referring your patient, Murray Nicholson, to the [facility] SOLID ORGAN TRANSPLANT at [facility]. Please see a copy of my visit note below.    See procedure note      Again, thank you for allowing me to participate in the care of your patient.      Sincerely,    Maliha Jesus NP

## 2020-01-27 NOTE — NURSING NOTE
Chief Complaint   Patient presents with     Cystoscopy     Stent removal     Blood pressure (!) 155/92, pulse 88, temperature 97.7  F (36.5  C), temperature source Oral, weight 70.8 kg (156 lb), SpO2 99 %.    Prepped patient for procedure with no complications.  Assisted with stent removal.  Administered Levaquin after procedure.  Patient was discharged in stable condition.    Administrations This Visit     levofloxacin (LEVAQUIN) tablet 500 mg     Admin Date  01/27/2020 Action  Given Dose  500 mg Route  Oral Administered By  Roxana Urbina CMA          lidocaine (XYLOCAINE) 2 % external gel     Admin Date  01/27/2020 Action  Given Dose   Route  Urethral Administered By  Roxana Urbina CMA Karen Moore, CMA

## 2020-01-28 DIAGNOSIS — Z94.0 KIDNEY REPLACED BY TRANSPLANT: ICD-10-CM

## 2020-01-28 LAB
DONOR IDENTIFICATION: NORMAL
DONOR IDENTIFICATION: NORMAL
DSA COMMENTS: NORMAL
DSA COMMENTS: NORMAL
DSA PRESENT: NO
DSA PRESENT: NO
DSA TEST METHOD: NORMAL
DSA TEST METHOD: NORMAL
INTERFERING SUBST TEST METHOD: NORMAL
INTERFERING SUBSTANCE COMMENT: NORMAL
INTERFERING SUBSTANCE RESULT: NORMAL
INTERFERING SUBSTANCE: NORMAL
ORGAN: NORMAL
ORGAN: NORMAL
SA1 CELL: NORMAL
SA1 COMMENTS: NORMAL
SA1 HI RISK ABY: NORMAL
SA1 MOD RISK ABY: NORMAL
SA1 TEST METHOD: NORMAL
SA2 CELL: NORMAL
SA2 COMMENTS: NORMAL
SA2 HI RISK ABY UA: NORMAL
SA2 MOD RISK ABY: NORMAL
SA2 TEST METHOD: NORMAL
UNACCEPTABLE ANTIGEN: NORMAL
UNOS CPRA: 0

## 2020-01-29 ENCOUNTER — OFFICE VISIT (OUTPATIENT)
Dept: NEPHROLOGY | Facility: CLINIC | Age: 65
End: 2020-01-29
Attending: INTERNAL MEDICINE
Payer: MEDICARE

## 2020-01-29 ENCOUNTER — TELEPHONE (OUTPATIENT)
Dept: TRANSPLANT | Facility: CLINIC | Age: 65
End: 2020-01-29

## 2020-01-29 VITALS
HEART RATE: 84 BPM | DIASTOLIC BLOOD PRESSURE: 78 MMHG | SYSTOLIC BLOOD PRESSURE: 133 MMHG | OXYGEN SATURATION: 100 % | BODY MASS INDEX: 23.21 KG/M2 | HEIGHT: 69 IN | TEMPERATURE: 97.7 F

## 2020-01-29 DIAGNOSIS — D84.9 IMMUNOSUPPRESSION (H): ICD-10-CM

## 2020-01-29 DIAGNOSIS — B25.9 CYTOMEGALOVIRUS (CMV) VIREMIA (H): ICD-10-CM

## 2020-01-29 DIAGNOSIS — E55.9 VITAMIN D DEFICIENCY: ICD-10-CM

## 2020-01-29 DIAGNOSIS — G47.33 OSA ON CPAP: ICD-10-CM

## 2020-01-29 DIAGNOSIS — D63.1 ANEMIA IN STAGE 3 CHRONIC KIDNEY DISEASE (H): ICD-10-CM

## 2020-01-29 DIAGNOSIS — Z48.298 AFTERCARE FOLLOWING ORGAN TRANSPLANT: ICD-10-CM

## 2020-01-29 DIAGNOSIS — E11.22 TYPE 2 DIABETES MELLITUS WITH CHRONIC KIDNEY DISEASE ON CHRONIC DIALYSIS, WITHOUT LONG-TERM CURRENT USE OF INSULIN (H): ICD-10-CM

## 2020-01-29 DIAGNOSIS — E83.42 HYPOMAGNESEMIA: ICD-10-CM

## 2020-01-29 DIAGNOSIS — Z94.0 HTN, KIDNEY TRANSPLANT RELATED: ICD-10-CM

## 2020-01-29 DIAGNOSIS — N18.6 TYPE 2 DIABETES MELLITUS WITH CHRONIC KIDNEY DISEASE ON CHRONIC DIALYSIS, WITHOUT LONG-TERM CURRENT USE OF INSULIN (H): ICD-10-CM

## 2020-01-29 DIAGNOSIS — N18.30 ANEMIA IN STAGE 3 CHRONIC KIDNEY DISEASE (H): ICD-10-CM

## 2020-01-29 DIAGNOSIS — Z94.1 HEART REPLACED BY TRANSPLANT (H): ICD-10-CM

## 2020-01-29 DIAGNOSIS — N25.81 SECONDARY RENAL HYPERPARATHYROIDISM (H): ICD-10-CM

## 2020-01-29 DIAGNOSIS — Z99.2 TYPE 2 DIABETES MELLITUS WITH CHRONIC KIDNEY DISEASE ON CHRONIC DIALYSIS, WITHOUT LONG-TERM CURRENT USE OF INSULIN (H): ICD-10-CM

## 2020-01-29 DIAGNOSIS — D72.819 LEUKOPENIA, UNSPECIFIED TYPE: ICD-10-CM

## 2020-01-29 DIAGNOSIS — Z94.0 KIDNEY REPLACED BY TRANSPLANT: Primary | ICD-10-CM

## 2020-01-29 DIAGNOSIS — I15.1 HTN, KIDNEY TRANSPLANT RELATED: ICD-10-CM

## 2020-01-29 DIAGNOSIS — D47.2 MGUS (MONOCLONAL GAMMOPATHY OF UNKNOWN SIGNIFICANCE): ICD-10-CM

## 2020-01-29 LAB
MYCOPHENOLATE SERPL LC/MS/MS-MCNC: 2.39 MG/L (ref 1–3.5)
MYCOPHENOLATE-G SERPL LC/MS/MS-MCNC: 54.6 MG/L (ref 30–95)
TME LAST DOSE: NORMAL H

## 2020-01-29 PROCEDURE — G0463 HOSPITAL OUTPT CLINIC VISIT: HCPCS | Mod: ZF

## 2020-01-29 ASSESSMENT — PAIN SCALES - GENERAL: PAINLEVEL: NO PAIN (0)

## 2020-01-29 NOTE — PROGRESS NOTES
ACUTE TRANSPLANT NEPHROLOGY VISIT    Assessment & Plan   # LDKT: Stable   - Baseline Cr ~ 1.0-1.2   - Proteinuria: Minimal (0.2-0.5 grams)   - Date DSA Last Checked: Jan/2020      Latest DSA: No   - BK Viremia: No   - Kidney Tx Biopsy: No    Cr 1.07mgdL, he has stable renal function and urinary symptoms. He had his stent removed 01/27 and was supratherapeutic on tac several days ago. BK checked 1/27 (negative), no DSA check yet.     # Heart biopsy: She has negative cardiac biopsies in the past, and her cardiology team report that her transplant is stable. No DSA as of 1/27/2020    # Immunosuppression: Tacrolimus immediate release (goal 8-10) and Mycophenolate mofetil (goal 1.0-3.5)   - Changes: Yes - tacrolimus 10.7 (1/27/2020), and dose reduced to 2.5mg BID. Last MPA level was within goal (2.39 on 1/27/2020). Getting level today    # Infection Prophylaxis:   - PJP: Sulfa/TMP (Bactrim)  - CMV: Valtrex 1500mg BID x 3 months  (R+/D+)    Will check CMV PCR today    # Hypertension: Controlled;  Goal BP: < 130/80   - Volume status: Euvolemic  EDW~ 150-152lbs   - Changes: No    # Elevated Blood Glucose: Glucose generally running ~ 170-200   - Management as per primary care. On 10U Lantus in the morning    # Anemia in Chronic Renal Disease: Hgb: Stable      ANASTASIYA: No   - Iron studies: Replete    # Mineral Bone Disorder:   - Secondary renal hyperparathyroidism; PTH level: Minimally elevated ( pg/ml)        On treatment: Calcitriol  - Vitamin D; level: Normal        On Supplement: Yes  - Calcium; level: Normal        On Supplement: No  - Phosphorus; level: Normal        On Supplement: No    # Electrolytes:   - Potassium; level: Normal        On Supplement: No  - Magnesium; level: Normal        On Supplement: Yes  - Bicarbonate; level: Normal        On Supplement: Yes  - Sodium; level: Normal        On Supplement: No    # Peripheral artery disease: He denies any claudication symptoms, and has no paraesthesias or pain in  the legs.    # MGUS: Normal kappa/lambda ratio with a stable peak. We will recheck his K/L ratio at the 4-month post--transplant alberto.     # SHEELA: Does not use a CPAP machine, he doesn't like the way it makes him feel. He has lost 25lbs in the last few months and feels well.    # Medical Compliance: Yes    # Transplant History:  Etiology of Kidney Failure: Diabetes mellitus type 2  Tx: LDKT  Transplant: 12/19/2019 (Kidney), 6/14/2018 (Heart)  Donor Type: Living Donor Class:   Crossmatch at time of Tx: negative  DSA at time of Tx: No  Significant changes in immunosuppression: None  CMV IgG Ab High Risk Discordance (D+/R-): No  EBV IgG Ab High Risk Discordance (D+/R-): No  Significant transplant-related complications: None    Transplant Office Phone Number: [phone]    Assessment and plan was discussed with the patient and he voiced his understanding and agreement.    Return visit: Return in about 1 month (around 2/29/2020).    Jeremiah Kelly MD    Chief Complaint   Mr. Nicholson is a 65 year old here for routine follow up.     History of Present Illness      He is s/p LDKT 12/19/19, performed by Dr Campbell, who presents for his 1 month follow up.      He has a history of bicuspid aortic valve and moderate aortic insufficiency w/ ischemic cardiomyopathy s/p Raymond 6/2018, h/o DMT2, h/o ESRD on HD since 2017, h/o MGUS.     He reports no nausea or vomiting. He has no fevers or chills. He has no diarrhea or constipation. He has no dysuria. He had his staples and stent removed several days ago. He has no leg swelling. He has a good appetite and has no dysphagia.     Recent Hospitalizations:  [x] No [] Yes    New Medical Issues: [x] No [] Yes    Decreased energy: [x] No [] Yes    Chest pain or SOB with exertion:  [x] No [] Yes    Appetite change or weight change: [x] No [] Yes    Nausea, vomiting or diarrhea:  [x] No [] Yes    Fever, sweats or chills: [x] No [] Yes    Leg swelling: [x] No [] Yes      Home BP:  120-125/75-80    Review of Systems   A comprehensive review of systems was obtained and negative, except as noted in the HPI or PMH.    Problem List   Patient Active Problem List   Diagnosis     Esophageal reflux     Allergic rhinitis     Anemia in chronic renal disease     Dyslipidemia     MGUS (monoclonal gammopathy of unknown significance)     Ascending aortic aneurysm (H)     Leukopenia     Sigmoid diverticulitis     Heart replaced by transplant (H)     Aortic stenosis     Status post coronary angiogram     Immunosuppression (H)     Type 2 diabetes mellitus (H)     Pancreatic cyst     Kidney replaced by transplant     Aftercare following organ transplant     HTN, kidney transplant related     Secondary renal hyperparathyroidism (H)     Vitamin D deficiency     Hypomagnesemia     SHEELA on CPAP       Social History   Social History     Tobacco Use     Smoking status: Former Smoker     Packs/day: 1.00     Years: 20.00     Pack years: 20.00     Types: Cigarettes     Start date: 1984     Last attempt to quit: 1994     Years since quittin.4     Smokeless tobacco: Never Used   Substance Use Topics     Alcohol use: No     Alcohol/week: 0.0 standard drinks     Drug use: No       Allergies   Allergies   Allergen Reactions     Norco [Hydrocodone-Acetaminophen] Nausea and Vomiting     Cats      Throat tightness     Isosorbide Other (See Comments)     hypotension     Penicillins Hives     Seasonal Allergies      rhinitis     Shrimp      Throat closes        Medications   Current Outpatient Medications   Medication Sig     acetaminophen (TYLENOL) 325 MG tablet Take 2 tablets (650 mg) by mouth every 4 hours as needed for other (multimodal surgical pain management along with NSAIDS and opioid medication as indicated based on pain control and physical function.)     aspirin 81 MG EC tablet Take 81 mg by mouth daily     atorvastatin (LIPITOR) 40 MG tablet Take 1 tablet (40 mg) by mouth daily     blood glucose  (ACCU-CHEK GUIDE) test strip Use to test blood sugar 2 times daily or as directed.     blood glucose monitoring (ACCU-CHEK FASTCLIX) lancets Use to test blood sugar 2 times daily or as directed.     calcitRIOL (ROCALTROL) 0.25 MCG capsule Take 1 capsule (0.25 mcg) by mouth daily     fluticasone (FLONASE) 50 MCG/ACT nasal spray Spray 1 spray into both nostrils daily (Patient taking differently: Spray 1 spray into both nostrils daily as needed )     HYDROmorphone (DILAUDID) 2 MG tablet Take 1 tablet (2 mg) by mouth every 4 hours as needed for moderate to severe pain     insulin glargine (LANTUS PEN) 100 UNIT/ML pen Inject 10 Units Subcutaneous every morning Or as directed     magnesium oxide (MAG-OX) 400 MG tablet Take 1 tablet (400 mg) by mouth daily (with lunch)     melatonin 1 MG TABS tablet Take 2 tablets (2 mg) by mouth nightly as needed for sleep     mupirocin (BACTROBAN) 2 % external ointment Apply topically 3 times daily     mycophenolate (GENERIC EQUIVALENT) 250 MG capsule Take 3 capsules (750 mg) by mouth 2 times daily     ondansetron (ZOFRAN-ODT) 4 MG ODT tab Take 1 tablet (4 mg) by mouth every 6 hours as needed for nausea or vomiting     pantoprazole (PROTONIX) 40 MG EC tablet Take 40 mg by mouth daily     polyethylene glycol (MIRALAX/GLYCOLAX) packet Take 17 g by mouth daily     senna-docusate (SENOKOT-S/PERICOLACE) 8.6-50 MG tablet Take 2 tablets by mouth 2 times daily     sodium bicarbonate 650 MG tablet Take 2 tablets (1,300 mg) by mouth 2 times daily     sulfamethoxazole-trimethoprim (BACTRIM/SEPTRA) 400-80 MG tablet Take 1 tablet by mouth daily     tacrolimus (GENERIC EQUIVALENT) 0.5 MG capsule HOLD     tacrolimus (GENERIC EQUIVALENT) 1 MG capsule Take 3 capsules (3 mg) by mouth 2 times daily     valACYclovir (VALTREX) 500 MG tablet Take 3 tablets (1,500 mg) by mouth 2 times daily     Vitamin D3 (CHOLECALCIFEROL) 25 mcg (1000 units) tablet Take 1 tablet (25 mcg) by mouth daily     No current  "facility-administered medications for this visit.      Facility-Administered Medications Ordered in Other Visits   Medication     diatrizoate meglumine-sodium (GASTROGRAFIN/GASTROVIEW) 66-10 % solution 480 mL     Medications Discontinued During This Encounter   Medication Reason     insulin glargine (LANTUS PEN) 100 UNIT/ML pen        Physical Exam   Vital Signs: /78   Pulse 84   Temp 97.7  F (36.5  C) (Oral)   Ht 1.746 m (5' 8.75\")   SpO2 100%   BMI 23.21 kg/m      GENERAL APPEARANCE: alert and no distress  HENT: mouth without ulcers or lesions  LYMPHATICS: no cervical or supraclavicular nodes  RESP: lungs clear to auscultation - no rales, rhonchi or wheezes  CV: regular rhythm, normal rate, no rub, no murmur  EDEMA: no LE edema bilaterally  ABDOMEN: soft, nondistended, nontender, bowel sounds normal  MS: extremities normal - no gross deformities noted, no evidence of inflammation in joints, no muscle tenderness  SKIN: no rash    Data     Renal Latest Ref Rng & Units 1/27/2020 1/20/2020 1/13/2020   Na 133 - 144 mmol/L 136 139 137   K 3.4 - 5.3 mmol/L 3.8 4.1 4.1   Cl 94 - 109 mmol/L 105 110(H) 108   CO2 20 - 32 mmol/L 24 19(L) 19(L)   BUN 7 - 30 mg/dL 18 18 22   Cr 0.66 - 1.25 mg/dL 1.07 1.06 1.26(H)   Glucose 70 - 99 mg/dL 171(H) 176(H) 206(H)   Ca  8.5 - 10.1 mg/dL 8.8 8.8 9.1   Mg 1.6 - 2.3 mg/dL 1.6 - 1.4(L)     Bone Health Latest Ref Rng & Units 1/27/2020 1/20/2020 1/13/2020   Phos 2.5 - 4.5 mg/dL 4.2 - 4.3   PTHi 18 - 80 pg/mL - 126(H) -   Vit D Def 20 - 75 ug/L - - -     Heme Latest Ref Rng & Units 1/27/2020 1/20/2020 1/13/2020   WBC 4.0 - 11.0 10e9/L 3.6(L) 3.4(L) 3.8(L)   Hgb 13.3 - 17.7 g/dL 11.5(L) 11.8(L) 11.5(L)   Plt 150 - 450 10e9/L 168 236 259     Liver Latest Ref Rng & Units 12/24/2019 12/10/2019 10/28/2019   AP 40 - 150 U/L 278(H) 372(H) 318(H)   TBili 0.2 - 1.3 mg/dL 1.1 1.0 0.7   DBili 0.0 - 0.2 mg/dL 0.2 - -   ALT 0 - 70 U/L 18 16 14   AST 0 - 45 U/L 15 11 11   Tot Protein 6.8 - 8.8 " g/dL 7.1 7.6 7.6   Albumin 3.4 - 5.0 g/dL 3.8 4.0 3.8     Pancreas Latest Ref Rng & Units 12/24/2019 12/20/2019 12/10/2019   A1C 0 - 5.6 % 4.5 4.3 5.2   Amylase 30 - 110 U/L - - -     Iron studies Latest Ref Rng & Units 12/10/2019 6/10/2019 7/10/2018   Iron 35 - 180 ug/dL 84 118 66   Iron sat 15 - 46 % 35 47(H) 28   Ferritin 26 - 388 ng/mL 445(H) 644(H) 771(H)     [facility] Txp Virology Latest Ref Rng & Units 12/10/2019 10/28/2019 6/7/2019   CVM DNA Quant - - EDTA PLASMA Plasma, EDTA anticoagulant   Hep B Core NR:Nonreactive Nonreactive - Nonreactive        Recent Labs   Lab Test 01/20/20  0920 01/23/20  0915 01/27/20  0934   DOSTAC 21.10 [id number]  [id number]   TACROL 7.7 9.6 10.7     Recent Labs   Lab Test 12/12/18  0612 12/26/19  0720 01/27/20  0918   DOSMPA Not Provided Not Provided NTP   MPACID 4.23* 1.65 2.39   MPAG 144.3* 58.9 54.6     Patient was seen and evaluated by me, Gilberto Ulloa MD. I have reviewed the note and agree with the the plan of care as documented by the fellow.

## 2020-01-29 NOTE — LETTER
1/29/2020      RE: Murray Nicholson  [address]  [place] [address]       ACUTE TRANSPLANT NEPHROLOGY VISIT    Assessment & Plan   # LDKT: Stable   - Baseline Cr ~ 1.0-1.2   - Proteinuria: Minimal (0.2-0.5 grams)   - Date DSA Last Checked: Jan/2020      Latest DSA: No   - BK Viremia: No   - Kidney Tx Biopsy: No    Cr 1.07mgdL, he has stable renal function and urinary symptoms. He had his stent removed 01/27 and was supratherapeutic on tac several days ago. BK checked 1/27 (negative), no DSA check yet.     # Heart biopsy: She has negative cardiac biopsies in the past, and her cardiology team report that her transplant is stable. No DSA as of 1/27/2020    # Immunosuppression: Tacrolimus immediate release (goal 8-10) and Mycophenolate mofetil (goal 1.0-3.5)   - Changes: Yes - tacrolimus 10.7 (1/27/2020), and dose reduced to 2.5mg BID. Last MPA level was within goal (2.39 on 1/27/2020). Getting level today    # Infection Prophylaxis:   - PJP: Sulfa/TMP (Bactrim)  - CMV: Valtrex 1500mg BID x 3 months  (R+/D+)    Will check CMV PCR today    # Hypertension: Controlled;  Goal BP: < 130/80   - Volume status: Euvolemic  EDW~ 150-152lbs   - Changes: No    # Elevated Blood Glucose: Glucose generally running ~ 170-200   - Management as per primary care. On 10U Lantus in the morning    # Anemia in Chronic Renal Disease: Hgb: Stable      ANASTASIYA: No   - Iron studies: Replete    # Mineral Bone Disorder:   - Secondary renal hyperparathyroidism; PTH level: Minimally elevated ( pg/ml)        On treatment: Calcitriol  - Vitamin D; level: Normal        On Supplement: Yes  - Calcium; level: Normal        On Supplement: No  - Phosphorus; level: Normal        On Supplement: No    # Electrolytes:   - Potassium; level: Normal        On Supplement: No  - Magnesium; level: Normal        On Supplement: Yes  - Bicarbonate; level: Normal        On Supplement: Yes  - Sodium; level: Normal        On Supplement: No    # Peripheral  artery disease: He denies any claudication symptoms, and has no paraesthesias or pain in the legs.    # MGUS: Normal kappa/lambda ratio with a stable peak. We will recheck his K/L ratio at the 4-month post--transplant alberto.     # SHEELA: Does not use a CPAP machine, he doesn't like the way it makes him feel. He has lost 25lbs in the last few months and feels well.    # Medical Compliance: Yes    # Transplant History:  Etiology of Kidney Failure: Diabetes mellitus type 2  Tx: LDKT  Transplant: 12/19/2019 (Kidney), 6/14/2018 (Heart)  Donor Type: Living Donor Class:   Crossmatch at time of Tx: negative  DSA at time of Tx: No  Significant changes in immunosuppression: None  CMV IgG Ab High Risk Discordance (D+/R-): No  EBV IgG Ab High Risk Discordance (D+/R-): No  Significant transplant-related complications: None    Transplant Office Phone Number: [phone]    Assessment and plan was discussed with the patient and he voiced his understanding and agreement.    Return visit: Return in about 1 month (around 2/29/2020).    Jeremiah Kelly MD    Chief Complaint   Mr. Nicholson is a 65 year old here for routine follow up.     History of Present Illness      He is s/p LDKT 12/19/19, performed by Dr Campbell, who presents for his 1 month follow up.      He has a history of bicuspid aortic valve and moderate aortic insufficiency w/ ischemic cardiomyopathy s/p Raymond 6/2018, h/o DMT2, h/o ESRD on HD since 2017, h/o MGUS.     He reports no nausea or vomiting. He has no fevers or chills. He has no diarrhea or constipation. He has no dysuria. He had his staples and stent removed several days ago. He has no leg swelling. He has a good appetite and has no dysphagia.     Recent Hospitalizations:  [x] No [] Yes    New Medical Issues: [x] No [] Yes    Decreased energy: [x] No [] Yes    Chest pain or SOB with exertion:  [x] No [] Yes    Appetite change or weight change: [x] No [] Yes    Nausea, vomiting or diarrhea:  [x] No [] Yes    Fever,  sweats or chills: [x] No [] Yes    Leg swelling: [x] No [] Yes      Home BP: 120-125/75-80    Review of Systems   A comprehensive review of systems was obtained and negative, except as noted in the HPI or PMH.    Problem List   Patient Active Problem List   Diagnosis     Esophageal reflux     Allergic rhinitis     Anemia in chronic renal disease     Dyslipidemia     MGUS (monoclonal gammopathy of unknown significance)     Ascending aortic aneurysm (H)     Leukopenia     Sigmoid diverticulitis     Heart replaced by transplant (H)     Aortic stenosis     Status post coronary angiogram     Immunosuppression (H)     Type 2 diabetes mellitus (H)     Pancreatic cyst     Kidney replaced by transplant     Aftercare following organ transplant     HTN, kidney transplant related     Secondary renal hyperparathyroidism (H)     Vitamin D deficiency     Hypomagnesemia     SHEELA on CPAP       Social History   Social History     Tobacco Use     Smoking status: Former Smoker     Packs/day: 1.00     Years: 20.00     Pack years: 20.00     Types: Cigarettes     Start date: 1984     Last attempt to quit: 1994     Years since quittin.4     Smokeless tobacco: Never Used   Substance Use Topics     Alcohol use: No     Alcohol/week: 0.0 standard drinks     Drug use: No       Allergies   Allergies   Allergen Reactions     Norco [Hydrocodone-Acetaminophen] Nausea and Vomiting     Cats      Throat tightness     Isosorbide Other (See Comments)     hypotension     Penicillins Hives     Seasonal Allergies      rhinitis     Shrimp      Throat closes        Medications   Current Outpatient Medications   Medication Sig     acetaminophen (TYLENOL) 325 MG tablet Take 2 tablets (650 mg) by mouth every 4 hours as needed for other (multimodal surgical pain management along with NSAIDS and opioid medication as indicated based on pain control and physical function.)     aspirin 81 MG EC tablet Take 81 mg by mouth daily     atorvastatin (LIPITOR)  40 MG tablet Take 1 tablet (40 mg) by mouth daily     blood glucose (ACCU-CHEK GUIDE) test strip Use to test blood sugar 2 times daily or as directed.     blood glucose monitoring (ACCU-CHEK FASTCLIX) lancets Use to test blood sugar 2 times daily or as directed.     calcitRIOL (ROCALTROL) 0.25 MCG capsule Take 1 capsule (0.25 mcg) by mouth daily     fluticasone (FLONASE) 50 MCG/ACT nasal spray Spray 1 spray into both nostrils daily (Patient taking differently: Spray 1 spray into both nostrils daily as needed )     HYDROmorphone (DILAUDID) 2 MG tablet Take 1 tablet (2 mg) by mouth every 4 hours as needed for moderate to severe pain     insulin glargine (LANTUS PEN) 100 UNIT/ML pen Inject 10 Units Subcutaneous every morning Or as directed     magnesium oxide (MAG-OX) 400 MG tablet Take 1 tablet (400 mg) by mouth daily (with lunch)     melatonin 1 MG TABS tablet Take 2 tablets (2 mg) by mouth nightly as needed for sleep     mupirocin (BACTROBAN) 2 % external ointment Apply topically 3 times daily     mycophenolate (GENERIC EQUIVALENT) 250 MG capsule Take 3 capsules (750 mg) by mouth 2 times daily     ondansetron (ZOFRAN-ODT) 4 MG ODT tab Take 1 tablet (4 mg) by mouth every 6 hours as needed for nausea or vomiting     pantoprazole (PROTONIX) 40 MG EC tablet Take 40 mg by mouth daily     polyethylene glycol (MIRALAX/GLYCOLAX) packet Take 17 g by mouth daily     senna-docusate (SENOKOT-S/PERICOLACE) 8.6-50 MG tablet Take 2 tablets by mouth 2 times daily     sodium bicarbonate 650 MG tablet Take 2 tablets (1,300 mg) by mouth 2 times daily     sulfamethoxazole-trimethoprim (BACTRIM/SEPTRA) 400-80 MG tablet Take 1 tablet by mouth daily     tacrolimus (GENERIC EQUIVALENT) 0.5 MG capsule HOLD     tacrolimus (GENERIC EQUIVALENT) 1 MG capsule Take 3 capsules (3 mg) by mouth 2 times daily     valACYclovir (VALTREX) 500 MG tablet Take 3 tablets (1,500 mg) by mouth 2 times daily     Vitamin D3 (CHOLECALCIFEROL) 25 mcg (1000 units)  "tablet Take 1 tablet (25 mcg) by mouth daily     No current facility-administered medications for this visit.      Facility-Administered Medications Ordered in Other Visits   Medication     diatrizoate meglumine-sodium (GASTROGRAFIN/GASTROVIEW) 66-10 % solution 480 mL     Medications Discontinued During This Encounter   Medication Reason     insulin glargine (LANTUS PEN) 100 UNIT/ML pen        Physical Exam   Vital Signs: /78   Pulse 84   Temp 97.7  F (36.5  C) (Oral)   Ht 1.746 m (5' 8.75\")   SpO2 100%   BMI 23.21 kg/m       GENERAL APPEARANCE: alert and no distress  HENT: mouth without ulcers or lesions  LYMPHATICS: no cervical or supraclavicular nodes  RESP: lungs clear to auscultation - no rales, rhonchi or wheezes  CV: regular rhythm, normal rate, no rub, no murmur  EDEMA: no LE edema bilaterally  ABDOMEN: soft, nondistended, nontender, bowel sounds normal  MS: extremities normal - no gross deformities noted, no evidence of inflammation in joints, no muscle tenderness  SKIN: no rash    Data     Renal Latest Ref Rng & Units 1/27/2020 1/20/2020 1/13/2020   Na 133 - 144 mmol/L 136 139 137   K 3.4 - 5.3 mmol/L 3.8 4.1 4.1   Cl 94 - 109 mmol/L 105 110(H) 108   CO2 20 - 32 mmol/L 24 19(L) 19(L)   BUN 7 - 30 mg/dL 18 18 22   Cr 0.66 - 1.25 mg/dL 1.07 1.06 1.26(H)   Glucose 70 - 99 mg/dL 171(H) 176(H) 206(H)   Ca  8.5 - 10.1 mg/dL 8.8 8.8 9.1   Mg 1.6 - 2.3 mg/dL 1.6 - 1.4(L)     Bone Health Latest Ref Rng & Units 1/27/2020 1/20/2020 1/13/2020   Phos 2.5 - 4.5 mg/dL 4.2 - 4.3   PTHi 18 - 80 pg/mL - 126(H) -   Vit D Def 20 - 75 ug/L - - -     Heme Latest Ref Rng & Units 1/27/2020 1/20/2020 1/13/2020   WBC 4.0 - 11.0 10e9/L 3.6(L) 3.4(L) 3.8(L)   Hgb 13.3 - 17.7 g/dL 11.5(L) 11.8(L) 11.5(L)   Plt 150 - 450 10e9/L 168 236 259     Liver Latest Ref Rng & Units 12/24/2019 12/10/2019 10/28/2019   AP 40 - 150 U/L 278(H) 372(H) 318(H)   TBili 0.2 - 1.3 mg/dL 1.1 1.0 0.7   DBili 0.0 - 0.2 mg/dL 0.2 - -   ALT 0 - 70 " U/L 18 16 14   AST 0 - 45 U/L 15 11 11   Tot Protein 6.8 - 8.8 g/dL 7.1 7.6 7.6   Albumin 3.4 - 5.0 g/dL 3.8 4.0 3.8     Pancreas Latest Ref Rng & Units 12/24/2019 12/20/2019 12/10/2019   A1C 0 - 5.6 % 4.5 4.3 5.2   Amylase 30 - 110 U/L - - -     Iron studies Latest Ref Rng & Units 12/10/2019 6/10/2019 7/10/2018   Iron 35 - 180 ug/dL 84 118 66   Iron sat 15 - 46 % 35 47(H) 28   Ferritin 26 - 388 ng/mL 445(H) 644(H) 771(H)     [facility] Txp Virology Latest Ref Rng & Units 12/10/2019 10/28/2019 6/7/2019   CVM DNA Quant - - EDTA PLASMA Plasma, EDTA anticoagulant   Hep B Core NR:Nonreactive Nonreactive - Nonreactive        Recent Labs   Lab Test 01/20/20  0920 01/23/20  0915 01/27/20  0934   DOSTAC 21.10 [id number]  [id number]   TACROL 7.7 9.6 10.7     Recent Labs   Lab Test 12/12/18  0612 12/26/19  0720 01/27/20  0918   DOSMPA Not Provided Not Provided NTP   MPACID 4.23* 1.65 2.39   MPAG 144.3* 58.9 54.6     Patient was seen and evaluated by me, Gilberto Ulloa MD. I have reviewed the note and agree with the the plan of care as documented by the fellow.      Early Post Transplant

## 2020-01-29 NOTE — NURSING NOTE
"Chief Complaint   Patient presents with     RECHECK     kidney tx     /78   Pulse 84   Temp 97.7  F (36.5  C) (Oral)   Ht 1.746 m (5' 8.75\")   SpO2 100%   BMI 23.21 kg/m    Alycia Phillips CMA  1/29/2020 9:20 AM    "

## 2020-01-29 NOTE — TELEPHONE ENCOUNTER
Acute Transplant Nephrology Clinic Visit with Care Coordinator    Overdue for labs:   No questions     If yes, which lab:     Patient informed of need for draw:  --checking CMV PCR QT   Current lab schedule:     Patient voiced understanding:     Per Dr Ulloa  -- NO changes at this time     Requested to move his lab appointments [facility]             Edith Ward RN

## 2020-01-30 ENCOUNTER — TELEPHONE (OUTPATIENT)
Dept: TRANSPLANT | Facility: CLINIC | Age: 65
End: 2020-01-30

## 2020-01-30 DIAGNOSIS — Z79.899 LONG TERM USE OF DRUG: ICD-10-CM

## 2020-01-30 DIAGNOSIS — E87.20 METABOLIC ACIDOSIS: ICD-10-CM

## 2020-01-30 DIAGNOSIS — N18.6 ESRD (END STAGE RENAL DISEASE) (H): ICD-10-CM

## 2020-01-30 DIAGNOSIS — Z94.0 KIDNEY REPLACED BY TRANSPLANT: ICD-10-CM

## 2020-01-30 DIAGNOSIS — Z76.82 AWAITING ORGAN TRANSPLANT: ICD-10-CM

## 2020-01-30 DIAGNOSIS — B25.9 CYTOMEGALOVIRUS (CMV) VIREMIA (H): ICD-10-CM

## 2020-01-30 DIAGNOSIS — R73.03 PRE-DIABETES: ICD-10-CM

## 2020-01-30 LAB
ALBUMIN UR-MCNC: NEGATIVE MG/DL
ALLOSURE DD-CFDNA: 0.37 %
ANION GAP SERPL CALCULATED.3IONS-SCNC: 7 MMOL/L (ref 3–14)
APPEARANCE UR: CLEAR
BILIRUB UR QL STRIP: NEGATIVE
BUN SERPL-MCNC: 22 MG/DL (ref 7–30)
CALCIUM SERPL-MCNC: 9 MG/DL (ref 8.5–10.1)
CHLORIDE SERPL-SCNC: 110 MMOL/L (ref 94–109)
CO2 SERPL-SCNC: 21 MMOL/L (ref 20–32)
COLOR UR AUTO: YELLOW
CREAT SERPL-MCNC: 0.95 MG/DL (ref 0.66–1.25)
ERYTHROCYTE [DISTWIDTH] IN BLOOD BY AUTOMATED COUNT: 15.9 % (ref 10–15)
GFR SERPL CREATININE-BSD FRML MDRD: 84 ML/MIN/{1.73_M2}
GLUCOSE SERPL-MCNC: 218 MG/DL (ref 70–99)
GLUCOSE UR STRIP-MCNC: NEGATIVE MG/DL
HCT VFR BLD AUTO: 35.6 % (ref 40–53)
HGB BLD-MCNC: 12.2 G/DL (ref 13.3–17.7)
HGB UR QL STRIP: ABNORMAL
KETONES UR STRIP-MCNC: NEGATIVE MG/DL
LEUKOCYTE ESTERASE UR QL STRIP: NEGATIVE
MAGNESIUM SERPL-MCNC: 1.5 MG/DL (ref 1.6–2.3)
MCH RBC QN AUTO: 34.5 PG (ref 26.5–33)
MCHC RBC AUTO-ENTMCNC: 34.3 G/DL (ref 31.5–36.5)
MCV RBC AUTO: 101 FL (ref 78–100)
MUCOUS THREADS #/AREA URNS LPF: PRESENT /LPF
NITRATE UR QL: NEGATIVE
PH UR STRIP: 5 PH (ref 5–7)
PHOSPHATE SERPL-MCNC: 4 MG/DL (ref 2.5–4.5)
PLATELET # BLD AUTO: 173 10E9/L (ref 150–450)
POTASSIUM SERPL-SCNC: 4.2 MMOL/L (ref 3.4–5.3)
RBC # BLD AUTO: 3.54 10E12/L (ref 4.4–5.9)
RBC #/AREA URNS AUTO: 2 /HPF (ref 0–2)
SODIUM SERPL-SCNC: 137 MMOL/L (ref 133–144)
SOURCE: ABNORMAL
SP GR UR STRIP: 1.02 (ref 1–1.03)
TACROLIMUS BLD-MCNC: 11 UG/L (ref 5–15)
TME LAST DOSE: NORMAL H
UROBILINOGEN UR STRIP-MCNC: 0 MG/DL (ref 0–2)
WBC # BLD AUTO: 4 10E9/L (ref 4–11)
WBC #/AREA URNS AUTO: 1 /HPF (ref 0–5)

## 2020-01-30 PROCEDURE — 87086 URINE CULTURE/COLONY COUNT: CPT | Performed by: SURGERY

## 2020-01-30 PROCEDURE — 80197 ASSAY OF TACROLIMUS: CPT | Performed by: INTERNAL MEDICINE

## 2020-01-30 RX ORDER — TACROLIMUS 1 MG/1
3 CAPSULE ORAL 2 TIMES DAILY
Qty: 180 CAPSULE | Refills: 11 | Status: SHIPPED | OUTPATIENT
Start: 2020-01-30 | End: 2020-01-30

## 2020-01-30 RX ORDER — TACROLIMUS 1 MG/1
2 CAPSULE ORAL 2 TIMES DAILY
Qty: 120 CAPSULE | Refills: 11 | Status: SHIPPED | OUTPATIENT
Start: 2020-01-30 | End: 2020-02-13

## 2020-01-30 RX ORDER — TACROLIMUS 0.5 MG/1
0.5 CAPSULE ORAL 2 TIMES DAILY
Qty: 60 CAPSULE | Refills: 11 | Status: SHIPPED | OUTPATIENT
Start: 2020-01-30 | End: 2020-02-13

## 2020-01-30 NOTE — TELEPHONE ENCOUNTER
ISSUE:   Tacrolimus IR level 11 on 1/30/2020, goal 8-10, dose 3 mg BID.    PLAN:   Please call patient and confirm this was an accurate 12-hour trough. Verify Tacrolimus IR dose 3 mg BID. Confirm no new medications or illness. Confirm no missed doses. If accurate trough and accurate dose, decrease Tacrolimus IR dose to 2.5 mg BID and repeat labs as scheduled.      OUTCOME:   RNCC left detailed VM for Murray with the above instructions and asked that he call back or send [employer] message to ensure that this dose change has been made.     Murray called back and confirmed the above / voiced understanding of dose change.

## 2020-01-31 LAB
BACTERIA SPEC CULT: NO GROWTH
BKV DNA # SPEC NAA+PROBE: NORMAL COPIES/ML
BKV DNA SPEC NAA+PROBE-LOG#: NORMAL LOG COPIES/ML
CMV DNA SPEC NAA+PROBE-ACNC: NORMAL [IU]/ML
CMV DNA SPEC NAA+PROBE-LOG#: NORMAL {LOG_IU}/ML
Lab: NORMAL
SPECIMEN SOURCE: NORMAL

## 2020-02-03 DIAGNOSIS — Z79.899 LONG TERM USE OF DRUG: ICD-10-CM

## 2020-02-03 DIAGNOSIS — Z94.0 KIDNEY REPLACED BY TRANSPLANT: ICD-10-CM

## 2020-02-03 LAB
ANION GAP SERPL CALCULATED.3IONS-SCNC: 8 MMOL/L (ref 3–14)
BUN SERPL-MCNC: 17 MG/DL (ref 7–30)
CALCIUM SERPL-MCNC: 8.8 MG/DL (ref 8.5–10.1)
CHLORIDE SERPL-SCNC: 107 MMOL/L (ref 94–109)
CO2 SERPL-SCNC: 23 MMOL/L (ref 20–32)
CREAT SERPL-MCNC: 0.97 MG/DL (ref 0.66–1.25)
ERYTHROCYTE [DISTWIDTH] IN BLOOD BY AUTOMATED COUNT: 16.3 % (ref 10–15)
GFR SERPL CREATININE-BSD FRML MDRD: 82 ML/MIN/{1.73_M2}
GLUCOSE SERPL-MCNC: 184 MG/DL (ref 70–99)
HCT VFR BLD AUTO: 34.6 % (ref 40–53)
HGB BLD-MCNC: 11.8 G/DL (ref 13.3–17.7)
MAGNESIUM SERPL-MCNC: 1.5 MG/DL (ref 1.6–2.3)
MCH RBC QN AUTO: 34.8 PG (ref 26.5–33)
MCHC RBC AUTO-ENTMCNC: 34.1 G/DL (ref 31.5–36.5)
MCV RBC AUTO: 102 FL (ref 78–100)
PHOSPHATE SERPL-MCNC: 4.2 MG/DL (ref 2.5–4.5)
PLATELET # BLD AUTO: 173 10E9/L (ref 150–450)
POTASSIUM SERPL-SCNC: 4 MMOL/L (ref 3.4–5.3)
RBC # BLD AUTO: 3.39 10E12/L (ref 4.4–5.9)
SODIUM SERPL-SCNC: 138 MMOL/L (ref 133–144)
TACROLIMUS BLD-MCNC: 10.7 UG/L (ref 5–15)
TME LAST DOSE: NORMAL H
WBC # BLD AUTO: 3.9 10E9/L (ref 4–11)

## 2020-02-03 PROCEDURE — 87799 DETECT AGENT NOS DNA QUANT: CPT | Performed by: INTERNAL MEDICINE

## 2020-02-03 PROCEDURE — 80197 ASSAY OF TACROLIMUS: CPT | Performed by: INTERNAL MEDICINE

## 2020-02-03 PROCEDURE — 80180 DRUG SCRN QUAN MYCOPHENOLATE: CPT | Performed by: INTERNAL MEDICINE

## 2020-02-04 DIAGNOSIS — Z94.1 HEART REPLACED BY TRANSPLANT (H): Primary | ICD-10-CM

## 2020-02-05 ENCOUNTER — TELEPHONE (OUTPATIENT)
Dept: TRANSPLANT | Facility: CLINIC | Age: 65
End: 2020-02-05

## 2020-02-05 NOTE — TELEPHONE ENCOUNTER
Post Kidney and Pancreas Transplant Team Conference  Date: 2/5/2020  Transplant Coordinator: Lillie Ulloa, Cami Villarreal     Attendees:  [x]  Dr. Quijano  [x] Patt Johns LPN     []  Dr. Ulloa [] Edith Ward, TONY [] Donna Wheat LPN   [x]  Dr. Escobar [] Marielena Romero RN     [] Radha Gray RN [x] Eduardo Lea, EllisD   [] Dr. Ayala [x] Jennifer Villarreal RN    [x] Dr. Cheney [] Fantasma Dorsey RN    [] Dr. Chase [] Autumn Mendez RN    [] Dr. Cowan [] Kari Carr RN     [] Anastasia Tanner RN    [] Surgery Fellow [x] Alma Rosa Monique RN    [] Maliha Jesus NP [] Scarlet Haines RN        Verbal Plan Read Back:   No changes at this time    Routed to RN Coordinator   Patt Johns LPN

## 2020-02-06 ENCOUNTER — HOSPITAL ENCOUNTER (OUTPATIENT)
Facility: CLINIC | Age: 65
Discharge: HOME | End: 2020-02-06
Attending: INTERNAL MEDICINE | Admitting: INTERNAL MEDICINE
Payer: MEDICARE

## 2020-02-06 ENCOUNTER — APPOINTMENT (OUTPATIENT)
Dept: MEDSURG UNIT | Facility: CLINIC | Age: 65
End: 2020-02-06
Payer: MEDICARE

## 2020-02-06 ENCOUNTER — TELEPHONE (OUTPATIENT)
Dept: TRANSPLANT | Facility: CLINIC | Age: 65
End: 2020-02-06

## 2020-02-06 ENCOUNTER — OFFICE VISIT (OUTPATIENT)
Dept: CARDIOLOGY | Facility: CLINIC | Age: 65
End: 2020-02-06
Attending: INTERNAL MEDICINE
Payer: MEDICARE

## 2020-02-06 VITALS
BODY MASS INDEX: 23.49 KG/M2 | HEART RATE: 94 BPM | SYSTOLIC BLOOD PRESSURE: 137 MMHG | OXYGEN SATURATION: 100 % | WEIGHT: 155 LBS | DIASTOLIC BLOOD PRESSURE: 80 MMHG | HEIGHT: 68 IN

## 2020-02-06 VITALS
RESPIRATION RATE: 20 BRPM | HEIGHT: 68 IN | TEMPERATURE: 97.4 F | OXYGEN SATURATION: 100 % | DIASTOLIC BLOOD PRESSURE: 99 MMHG | BODY MASS INDEX: 23.49 KG/M2 | SYSTOLIC BLOOD PRESSURE: 169 MMHG | WEIGHT: 155 LBS | HEART RATE: 91 BPM

## 2020-02-06 DIAGNOSIS — I10 ESSENTIAL HYPERTENSION: ICD-10-CM

## 2020-02-06 DIAGNOSIS — K86.2 PANCREATIC CYST: ICD-10-CM

## 2020-02-06 DIAGNOSIS — E11.9 DIABETES MELLITUS (H): ICD-10-CM

## 2020-02-06 DIAGNOSIS — I25.811 CORONARY ARTERY DISEASE INVOLVING NATIVE ARTERY OF TRANSPLANTED HEART WITHOUT ANGINA PECTORIS: ICD-10-CM

## 2020-02-06 DIAGNOSIS — Z79.899 LONG TERM USE OF DRUG: ICD-10-CM

## 2020-02-06 DIAGNOSIS — Z94.1 HEART REPLACED BY TRANSPLANT (H): ICD-10-CM

## 2020-02-06 DIAGNOSIS — B00.9 HSV (HERPES SIMPLEX VIRUS) INFECTION: ICD-10-CM

## 2020-02-06 DIAGNOSIS — D84.9 IMMUNOSUPPRESSION (H): ICD-10-CM

## 2020-02-06 DIAGNOSIS — Z94.0 KIDNEY REPLACED BY TRANSPLANT: ICD-10-CM

## 2020-02-06 DIAGNOSIS — Z94.0 KIDNEY REPLACED BY TRANSPLANT: Primary | ICD-10-CM

## 2020-02-06 DIAGNOSIS — Z94.1 HEART REPLACED BY TRANSPLANT (H): Primary | ICD-10-CM

## 2020-02-06 LAB
ANION GAP SERPL CALCULATED.3IONS-SCNC: 6 MMOL/L (ref 3–14)
BKV DNA # SPEC NAA+PROBE: NORMAL COPIES/ML
BKV DNA SPEC NAA+PROBE-LOG#: NORMAL LOG COPIES/ML
BUN SERPL-MCNC: 22 MG/DL (ref 7–30)
CALCIUM SERPL-MCNC: 8.9 MG/DL (ref 8.5–10.1)
CHLORIDE SERPL-SCNC: 108 MMOL/L (ref 94–109)
CO2 SERPL-SCNC: 23 MMOL/L (ref 20–32)
CREAT SERPL-MCNC: 1.12 MG/DL (ref 0.66–1.25)
CREAT UR-MCNC: 101 MG/DL
ERYTHROCYTE [DISTWIDTH] IN BLOOD BY AUTOMATED COUNT: 16.4 % (ref 10–15)
GFR SERPL CREATININE-BSD FRML MDRD: 69 ML/MIN/{1.73_M2}
GLUCOSE SERPL-MCNC: 192 MG/DL (ref 70–99)
HCT VFR BLD AUTO: 33.2 % (ref 40–53)
HGB BLD-MCNC: 11.4 G/DL (ref 13.3–17.7)
MAGNESIUM SERPL-MCNC: 1.6 MG/DL (ref 1.6–2.3)
MCH RBC QN AUTO: 35.2 PG (ref 26.5–33)
MCHC RBC AUTO-ENTMCNC: 34.3 G/DL (ref 31.5–36.5)
MCV RBC AUTO: 103 FL (ref 78–100)
MYCOPHENOLATE SERPL LC/MS/MS-MCNC: 2.41 MG/L (ref 1–3.5)
MYCOPHENOLATE-G SERPL LC/MS/MS-MCNC: 50.4 MG/L (ref 30–95)
PHOSPHATE SERPL-MCNC: 4.2 MG/DL (ref 2.5–4.5)
PLATELET # BLD AUTO: 165 10E9/L (ref 150–450)
POTASSIUM SERPL-SCNC: 4 MMOL/L (ref 3.4–5.3)
PROT UR-MCNC: 0.26 G/L
PROT/CREAT 24H UR: 0.26 G/G CR (ref 0–0.2)
RBC # BLD AUTO: 3.24 10E12/L (ref 4.4–5.9)
SODIUM SERPL-SCNC: 137 MMOL/L (ref 133–144)
SPECIMEN SOURCE: NORMAL
TACROLIMUS BLD-MCNC: <3 UG/L (ref 5–15)
TME LAST DOSE: ABNORMAL H
TME LAST DOSE: NORMAL H
WBC # BLD AUTO: 3.8 10E9/L (ref 4–11)

## 2020-02-06 PROCEDURE — 84100 ASSAY OF PHOSPHORUS: CPT | Performed by: NURSE PRACTITIONER

## 2020-02-06 PROCEDURE — 93505 ENDOMYOCARDIAL BIOPSY: CPT | Performed by: INTERNAL MEDICINE

## 2020-02-06 PROCEDURE — 99214 OFFICE O/P EST MOD 30 MIN: CPT | Mod: ZP | Performed by: NURSE PRACTITIONER

## 2020-02-06 PROCEDURE — 80197 ASSAY OF TACROLIMUS: CPT | Performed by: INTERNAL MEDICINE

## 2020-02-06 PROCEDURE — 88350 IMFLUOR EA ADDL 1ANTB STN PX: CPT | Performed by: INTERNAL MEDICINE

## 2020-02-06 PROCEDURE — 27210794 ZZH OR GENERAL SUPPLY STERILE: Performed by: INTERNAL MEDICINE

## 2020-02-06 PROCEDURE — 25000125 ZZHC RX 250: Performed by: INTERNAL MEDICINE

## 2020-02-06 PROCEDURE — 85027 COMPLETE CBC AUTOMATED: CPT | Performed by: NURSE PRACTITIONER

## 2020-02-06 PROCEDURE — 87799 DETECT AGENT NOS DNA QUANT: CPT | Performed by: NURSE PRACTITIONER

## 2020-02-06 PROCEDURE — 84156 ASSAY OF PROTEIN URINE: CPT | Performed by: INTERNAL MEDICINE

## 2020-02-06 PROCEDURE — 83735 ASSAY OF MAGNESIUM: CPT | Performed by: NURSE PRACTITIONER

## 2020-02-06 PROCEDURE — 80048 BASIC METABOLIC PNL TOTAL CA: CPT | Performed by: NURSE PRACTITIONER

## 2020-02-06 PROCEDURE — 88346 IMFLUOR 1ST 1ANTB STAIN PX: CPT | Performed by: INTERNAL MEDICINE

## 2020-02-06 PROCEDURE — 93505 ENDOMYOCARDIAL BIOPSY: CPT | Mod: 26 | Performed by: INTERNAL MEDICINE

## 2020-02-06 PROCEDURE — 40000166 ZZH STATISTIC PP CARE STAGE 1

## 2020-02-06 PROCEDURE — 36415 COLL VENOUS BLD VENIPUNCTURE: CPT | Performed by: NURSE PRACTITIONER

## 2020-02-06 PROCEDURE — 88307 TISSUE EXAM BY PATHOLOGIST: CPT | Performed by: INTERNAL MEDICINE

## 2020-02-06 PROCEDURE — C1894 INTRO/SHEATH, NON-LASER: HCPCS | Performed by: INTERNAL MEDICINE

## 2020-02-06 PROCEDURE — G0463 HOSPITAL OUTPT CLINIC VISIT: HCPCS | Mod: 25

## 2020-02-06 RX ORDER — LIDOCAINE 40 MG/G
CREAM TOPICAL
Status: COMPLETED | OUTPATIENT
Start: 2020-02-06 | End: 2020-02-06

## 2020-02-06 RX ADMIN — LIDOCAINE: 40 CREAM TOPICAL at 10:14

## 2020-02-06 ASSESSMENT — PAIN SCALES - GENERAL: PAINLEVEL: NO PAIN (0)

## 2020-02-06 ASSESSMENT — MIFFLIN-ST. JEOR
SCORE: 1466.55
SCORE: 1462.58

## 2020-02-06 NOTE — TELEPHONE ENCOUNTER
ISSUE:   Tacrolimus IR level undected on 2/6/2020 , goal 8-10, dose 2.5 mg BID.    PLAN:   Please call patient and confirm this was an accurate 12-hour trough. Verify Tacrolimus IR dose 2.5 mg BID. Confirm no new medications or illness. Confirm no missed doses. If accurate trough and accurate dose, stay on the same dose Tacrolimus IR and repeat labs tomorrow.    OUTCOME:   Spoke with patient, they confirm accurate trough level and current dose 2.5 mg BID. Patient confirmed no dose change and to repeat labs tomorrow. Orders for recheck and lab appointment made. Patient voiced understanding of plan.     Murray denies any missed doses.

## 2020-02-06 NOTE — PROGRESS NOTES
Pt on 2A post heart biopsy; pt walked back from cath lab with RN; pt awake, alert and steady on his feet. Denies pain; left neck is dry and intact. Pt taking PO apple juice. Clarified pt has discharge instructions. Will discharge on own.

## 2020-02-06 NOTE — IP AVS SNAPSHOT
[address] [address]                                    After Visit Summary   2/6/2020    Murray Nicholson    MRN: [id number]           After Visit Summary Signature Page    I have received my discharge instructions, and my questions have been answered. I have discussed any challenges I see with this plan with the nurse or doctor.    ..........................................................................................................................................  Patient/Patient Representative Signature      ..........................................................................................................................................  Patient Representative Print Name and Relationship to Patient    ..................................................               ................................................  Date                                   Time    ..........................................................................................................................................  Reviewed by Signature/Title    ...................................................              ..............................................  Date                                               Time          22EPIC Rev 08/18

## 2020-02-06 NOTE — PROGRESS NOTES
[facility]    HPI:   Mr. Nicholson is a 65 year old male s/p recent orthotopic heart transplant who presents to clinic for routine follow-up. Patient has history of systolic heart failure 2/2 NICM,  HTN, dyslipidemia, DM2. Patient was initially listed for heart/kidney transplant, ultimately underwent heart transplant 6/14/18 and s/p renal transplant 12/19/19. He had a very complex postoperative course from heart transplant as below     1.  Status post orthotopic heart transplantation.   2.  Drug induced neutropenia.   3.  Oral mucosal ulcer secondary to neutropenia with Klebsiella infection.   4.  Pseudomonas bacteremia,.   5.  Perforation of the small bowel, status post small-bowel resection and ileostomy now take down  6.  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   7.  Hypertension.   8.  Hyperlipidemia.   9. Iincidental pneumoperitoneum  10.  End-stage renal disease requiring hemodialysis post heart transplant  11. Prior RIJ thrombus infected with CoNS.  12. Donor coronary artery disease.   13. S/p renal transplant LDKT 12/19/19    Biospies have been negative for rejection. Last RHC showed normal cardiac output and filling pressures, last echo with normal graft function. Baseline angiogram w/ donor coronary disease in all three coronaries arteries including 40% proxLAD lesion, 50% OM1, 80% OM2 lesion, and 20-40% RCA disease. Since last transplant clinic visit, he underwent LDKT on 12/19/19, post op course was unremarkable and he has had stable renal function.     Since he was seen last year reports no other changes.  He denies any fluid retention, exertional symptoms, lightheadedness.  Blood pressures are controlled.  He denies fever, chills, abdominal pain, nausea, vomiting, diarrhea, rashes, mouth sores, night sweats.  He has noted weight loss that has been unintentional.        PAST MEDICAL HISTORY:  Past Medical History:   Diagnosis Date     (HFpEF) heart failure with preserved ejection fraction (H)       Allergic rhinitis, cause unspecified      Anemia of chronic kidney failure      AS (aortic stenosis)      Ascending aortic aneurysm (H)      Bicuspid aortic valve      CAD (coronary artery disease)      Congestive heart failure, unspecified      Dialysis patient (H)     [facility]     Dyslipidemia      Esophageal reflux      ESRD (end stage renal disease) (H)      Hearing problem      Heart replaced by transplant (H) 12/10/2018     Hypersomnia with sleep apnea, unspecified      Hypertension      Ileostomy status (H)      Immunosuppression (H)      MGUS (monoclonal gammopathy of unknown significance)      Mitral regurgitation      SHEELA (obstructive sleep apnea)     No CPAP     Pneumonia      Systolic heart failure (H)      Type 2 diabetes mellitus (H)        FAMILY HISTORY:  Family History   Problem Relation Age of Onset     C.A.D. Father          from-never knew father-age 60     Diabetes Father      Cerebrovascular Disease Father      Hypertension Father      Hypertension No family hx of      Breast Cancer No family hx of      Cancer - colorectal No family hx of      Prostate Cancer No family hx of      Kidney Disease No family hx of      Melanoma No family hx of      Skin Cancer No family hx of        SOCIAL HISTORY:  , not currently working    CURRENT MEDICATIONS:  acetaminophen (TYLENOL) 325 MG tablet, Take 2 tablets (650 mg) by mouth every 4 hours as needed for other (multimodal surgical pain management along with NSAIDS and opioid medication as indicated based on pain control and physical function.)  aspirin 81 MG EC tablet, Take 81 mg by mouth daily  atorvastatin (LIPITOR) 40 MG tablet, Take 1 tablet (40 mg) by mouth daily  blood glucose (ACCU-CHEK GUIDE) test strip, Use to test blood sugar 2 times daily or as directed.  blood glucose monitoring (ACCU-CHEK FASTCLIX) lancets, Use to test blood sugar 2 times daily or as directed.  fluticasone (FLONASE) 50 MCG/ACT nasal spray, Spray 1 spray into  both nostrils daily (Patient taking differently: Spray 1 spray into both nostrils daily as needed )  HYDROmorphone (DILAUDID) 2 MG tablet, Take 1 tablet (2 mg) by mouth every 4 hours as needed for moderate to severe pain  insulin glargine (LANTUS PEN) 100 UNIT/ML pen, Inject 10 Units Subcutaneous every morning Or as directed  magnesium oxide (MAG-OX) 400 MG tablet, Take 1 tablet (400 mg) by mouth daily (with lunch)  melatonin 1 MG TABS tablet, Take 2 tablets (2 mg) by mouth nightly as needed for sleep  mupirocin (BACTROBAN) 2 % external ointment, Apply topically 3 times daily  mycophenolate (GENERIC EQUIVALENT) 250 MG capsule, Take 3 capsules (750 mg) by mouth 2 times daily  ondansetron (ZOFRAN-ODT) 4 MG ODT tab, Take 1 tablet (4 mg) by mouth every 6 hours as needed for nausea or vomiting  pantoprazole (PROTONIX) 40 MG EC tablet, Take 40 mg by mouth daily  polyethylene glycol (MIRALAX/GLYCOLAX) packet, Take 17 g by mouth daily  senna-docusate (SENOKOT-S/PERICOLACE) 8.6-50 MG tablet, Take 2 tablets by mouth 2 times daily  sodium bicarbonate 650 MG tablet, Take 2 tablets (1,300 mg) by mouth 2 times daily  sulfamethoxazole-trimethoprim (BACTRIM/SEPTRA) 400-80 MG tablet, Take 1 tablet by mouth daily  tacrolimus (GENERIC EQUIVALENT) 0.5 MG capsule, Take 1 capsule (0.5 mg) by mouth 2 times daily HOLD  tacrolimus (GENERIC EQUIVALENT) 1 MG capsule, Take 2 capsules (2 mg) by mouth 2 times daily  valACYclovir (VALTREX) 500 MG tablet, Take 3 tablets (1,500 mg) by mouth 2 times daily  Vitamin D3 (CHOLECALCIFEROL) 25 mcg (1000 units) tablet, Take 1 tablet (25 mcg) by mouth daily    diatrizoate meglumine-sodium (GASTROGRAFIN/GASTROVIEW) 66-10 % solution 480 mL        ROS:  CONSTITUTIONAL: See HPI  HEENT: Denies HA  CV: See HPI  PULMONARY: See HPI  GI: See HPI  : Denies urinary alterations, dysuria, urinary frequency, hematuria, and abnormal drainage.   EXT: Denies lower extremity edema or color changes.   SKIN: Denies abnormal  "rashes or lesions.   MUSCULOSKELETAL: Denies upper or lower extremity weakness and pain.   NEUROLOGIC: Denies lightheadedness, dizziness, seizures, or upper or lower extremity paresthesia.     EXAM:  /80 (BP Location: Right arm, Patient Position: Chair, Cuff Size: Adult Regular)   Pulse 94   Ht 1.734 m (5' 8.25\")   Wt 70.3 kg (155 lb)   SpO2 100%   BMI 23.40 kg/m       GENERAL: Appears comfortable, in no acute distress.   HEENT: Eye symmetrical, no discharge or icterus bilaterally. Mucous membranes moist. Bilateral enlarged cervical lymph nodes.   CV: RRR, +S1S2, no murmur, rub, or gallop. JVP not visible at 60 degrees.   RESPIRATORY: Respirations regular, even, and unlabored. Lungs CTA throughout.   GI: Soft and non distended with normoactive bowel sounds present in all quadrants. No tenderness, rebound, guarding. No hepatomegaly.   EXTREMITIES: No peripheral edema. 2+ bilateral pedal pulses.   NEUROLOGIC: Alert and oriented x 3. No focal deficits.   MUSCULOSKELETAL: No joint swelling or tenderness.   SKIN: No jaundice. No rashes or lesions.     Labs - reviewed with patient in clinic today:  CBC RESULTS:  Lab Results   Component Value Date    WBC 3.8 (L) 02/06/2020    RBC 3.24 (L) 02/06/2020    HGB 11.4 (L) 02/06/2020    HCT 33.2 (L) 02/06/2020     (H) 02/06/2020    MCH 35.2 (H) 02/06/2020    MCHC 34.3 02/06/2020    RDW 16.4 (H) 02/06/2020     02/06/2020       CMP RESULTS:  Lab Results   Component Value Date     02/06/2020    POTASSIUM 4.0 02/06/2020    CHLORIDE 108 02/06/2020    CO2 23 02/06/2020    ANIONGAP 6 02/06/2020     (H) 02/06/2020    BUN 22 02/06/2020    CR 1.12 02/06/2020    GFRESTIMATED 69 02/06/2020    GFRESTBLACK 79 02/06/2020    TRINI 8.9 02/06/2020    BILITOTAL 1.1 12/24/2019    ALBUMIN 3.8 12/24/2019    ALKPHOS 278 (H) 12/24/2019    ALT 18 12/24/2019    AST 15 12/24/2019        INR RESULTS:  Lab Results   Component Value Date    INR 1.15 (H) 12/10/2019 "       LIPID RESULTS:  Lab Results   Component Value Date    CHOL 124 12/24/2019    HDL 51 12/24/2019    LDL 30 12/24/2019    TRIG 218 (H) 12/24/2019    CHOLHDLRATIO 5.0 08/10/2015       IMMUNOSUPPRESSANT LEVELS:  Lab Results   Component Value Date    TACROL 10.7 02/03/2020    DOSTAC 02/02/20 0915pm 02/03/2020       No components found for: CK  Lab Results   Component Value Date    MAG 1.5 (L) 02/03/2020     Lab Results   Component Value Date    A1C 4.5 12/24/2019     Lab Results   Component Value Date    PHOS 4.2 02/03/2020     Lab Results   Component Value Date    NTBNP 15,996 (H) 11/08/2016     Lab Results   Component Value Date    SA[place]STMET SA HI TA FCS 01/27/2020    SAICELL Class I 01/27/2020    [id number] None 01/27/2020    [id number] None 01/27/2020    SAIREPCOM  01/27/2020     Test performed by modified procedure. Serum heat inactivated, treated with   immunoadsorbent beads to remove interfering Thymoglobulin and tested by a   modified ([place]) protocol including fetal calf serum addition. High-risk,   mfi >3,000. Mod-risk, mfi 500-3,000.        Lab Results   Component Value Date    SAIITESTME SA HI TA FCS 01/27/2020    SAIICELL Class II 01/27/2020    [id number] None 01/27/2020    [id number] None 01/27/2020    SAIIREPCOM  01/27/2020     Test performed by modified procedure. Serum heat inactivated, treated with   immunoadsorbent beads to remove interfering Thymoglobulin and tested by a   modified ([place]) protocol including fetal calf serum addition. High-risk,   mfi >3,000. Mod-risk, mfi 500-3,000.        Lab Results   Component Value Date    CSPEC Plasma 01/30/2020       Diagnostic Studies:  TTE 6/10/19  Normal biventricular function, chamber size, wall motion, and wall  thickness.The EF is 65-70%.  Normal RV size and function.  No hemodynamically significant valvular anomalies.  The inferior vena cava was normal in size with preserved respiratory  variability. Estimated mean right atrial pressure is 3 mmHg  (normal).  No pericardial effusion is present.     This study was compared with the study from 12/3/2018. There has been no  change.    Cor angiogram 6/28/19  Left Anterior Descending   Prox LAD lesion is 40% stenosed.   First Diagonal Branch   The vessel is small.   Second Diagonal Branch   The vessel is moderate in size.   Third Diagonal Branch   The vessel is small.   Left Circumflex   First Obtuse Marginal Branch   The vessel is moderate in size.   1st Mrg lesion is 50% stenosed.   Second Obtuse Marginal Branch   The vessel is moderate in size.   2nd Mrg-1 lesion is 80% stenosed.   2nd Mrg-2 lesion is 80% stenosed.   Third Obtuse Marginal Branch   The vessel is moderate in size.   Right Coronary Artery   Prox RCA lesion is 20% stenosed.   Dist RCA lesion is 40% stenosed.   Right Posterior Descending Artery   RPDA lesion is 20% stenosed.     RHC 6/28/19  RA 9  PA 35/16(24)  PCWP 14  Td CO/CI 9.4/5    Assessment/Plan:  Mr. Nicholson is a 65 year old male who is s/p orthotopic heart transplant on 6/14/18 who presents to clinic for follow-up after recent renal transplant. Post-op course was complicated leukocytosis for which he received abx, RIJ thrombus infected with CoNS, incidental pneumoperitoneum, necrotic mouth sore with klebsiella and pseudomonas bacteremia, and perforation of the small bowel, status post small-bowel resection and ileostomy now s/p uncomplicated renal transplant on 12/19/19. From a cardiac standpoint, he has been stable with normal graft function, filling pressures, and hemodynamics. He has no DSAs. Does have donor CAD.     # Status post OHT on 6/14/18, history of NICM  # Donor 3 vessel CAD  # Chronic immunosuppression  * Rejection history: none.   * Recent immunosuppression changes: on higher IS for renal transplant   * Last biopsy: 10/28/19 negative  * Intolerance to medications: none    Immunosuppression:  - Tacrolimus trough level goal 8-10 per renal transplant   - Cellcept 750 mg bid  "    Prophylaxis:  - PPI: pantoprazole 40 mg daily  - CAV/CAD: ASA 81 mg and atorvastatin 40 mg (Crestor cost prohibitive), per last angiogram may benefit from lifelong dapt, will defer to Dr Ernst  - CMV high risk D+ R-: Valtrex 1500 mg bid x3 months (kidney D+/R+) f  - PCP: on bactrim daily indefinitely per renal tx     # Hx of LDKT 12/19/19. Followed closely by renal Tx. Cr 1.1 today.    # Enlarged cervical lymph nodes. No recent URI. Given weight loss there is some concern of PTLD but he has not had EBV viremia. Patient given reasons to call us - fevers, night sweats, fatigue. Will monitor.     # HTN. 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 Last MRI 4/2019 showed \"numerous pancreatic cystic foci are similar to those seen on 4/19/2018 with minimal increase in size of one of these cystic foci arising from the pancreatic body (now measuring 14 mm, previously 13 mm). These remain most consistent with side branch intraductal papillary mucinous neoplasms. No suspicious features on this unenhanced exam.\" Abdominal MRI q1-2 years per GI.        25 minutes spent face-to-face with patient, >50% in counseling and/or coordination of care as described above      Ramya Suh, FRANK, NP-C  2/6/2020                    "

## 2020-02-06 NOTE — IP AVS SNAPSHOT
MRN:[id number]                      After Visit Summary   2/6/2020    Murray Nicholson    MRN: [id number]           Visit Information        Department      2/6/2020  9:54 AM [address] [facility]          Review of your medicines      UNREVIEWED medicines. Ask your doctor about these medicines       Dose / Directions   acetaminophen 325 MG tablet  Commonly known as:  TYLENOL  Used for:  Kidney replaced by transplant      Dose:  650 mg  Take 2 tablets (650 mg) by mouth every 4 hours as needed for other (multimodal surgical pain management along with NSAIDS and opioid medication as indicated based on pain control and physical function.)  Quantity:  50 tablet  Refills:  1     aspirin 81 MG EC tablet      Dose:  81 mg  Take 81 mg by mouth daily  Refills:  0     atorvastatin 40 MG tablet  Commonly known as:  Lipitor  Used for:  Heart replaced by transplant (H)      Dose:  40 mg  Take 1 tablet (40 mg) by mouth daily  Quantity:  90 tablet  Refills:  3     fluticasone 50 MCG/ACT nasal spray  Commonly known as:  FLONASE  Used for:  Runny nose      Dose:  1 spray  Spray 1 spray into both nostrils daily  Quantity:  11.1 mL  Refills:  11     HYDROmorphone 2 MG tablet  Commonly known as:  DILAUDID  Used for:  Kidney replaced by transplant      Dose:  2 mg  Take 1 tablet (2 mg) by mouth every 4 hours as needed for moderate to severe pain  Quantity:  20 tablet  Refills:  0     insulin glargine 100 UNIT/ML pen  Commonly known as:  LANTUS PEN  Used for:  Kidney replaced by transplant      Dose:  10 Units  Inject 10 Units Subcutaneous every morning Or as directed  Refills:  0     magnesium oxide 400 MG tablet  Commonly known as:  MAG-OX  Used for:  Kidney replaced by transplant      Dose:  400 mg  Take 1 tablet (400 mg) by mouth daily (with lunch)  Quantity:  30 tablet  Refills:  2     mupirocin 2 % external ointment  Commonly known as:  BACTROBAN  Used for:  Skin ulcer, limited to breakdown of skin (H)      Apply  topically 3 times daily  Quantity:  15 g  Refills:  0     mycophenolate 250 MG capsule  Commonly known as:  GENERIC EQUIVALENT  Used for:  Kidney replaced by transplant      Dose:  750 mg  Take 3 capsules (750 mg) by mouth 2 times daily  Quantity:  180 capsule  Refills:  11     ondansetron 4 MG ODT tab  Commonly known as:  ZOFRAN-ODT  Used for:  Kidney replaced by transplant      Dose:  4 mg  Take 1 tablet (4 mg) by mouth every 6 hours as needed for nausea or vomiting  Quantity:  10 tablet  Refills:  1     pantoprazole 40 MG EC tablet  Commonly known as:  PROTONIX      Dose:  40 mg  Take 40 mg by mouth daily  Refills:  0     polyethylene glycol packet  Commonly known as:  MIRALAX/GLYCOLAX  Used for:  Kidney replaced by transplant      Dose:  17 g  Take 17 g by mouth daily  Quantity:  10 packet  Refills:  1     senna-docusate 8.6-50 MG tablet  Commonly known as:  SENOKOT-S/PERICOLACE  Used for:  Kidney replaced by transplant      Dose:  2 tablet  Take 2 tablets by mouth 2 times daily  Quantity:  50 tablet  Refills:  1     sodium bicarbonate 650 MG tablet  Used for:  Metabolic acidosis, Kidney replaced by transplant      Dose:  1,300 mg  Take 2 tablets (1,300 mg) by mouth 2 times daily  Quantity:  120 tablet  Refills:  2     sulfamethoxazole-trimethoprim 400-80 MG tablet  Commonly known as:  BACTRIM/SEPTRA  Indication:  PCP prophylaxis  Used for:  Kidney replaced by transplant      Dose:  1 tablet  Take 1 tablet by mouth daily  Quantity:  30 tablet  Refills:  11     * tacrolimus 0.5 MG capsule  Commonly known as:  GENERIC EQUIVALENT  Used for:  Kidney replaced by transplant, Metabolic acidosis      Dose:  0.5 mg  Take 1 capsule (0.5 mg) by mouth 2 times daily HOLD  Quantity:  60 capsule  Refills:  11     * tacrolimus 1 MG capsule  Commonly known as:  GENERIC EQUIVALENT  Used for:  Kidney replaced by transplant, Metabolic acidosis      Dose:  2 mg  Take 2 capsules (2 mg) by mouth 2 times daily  Quantity:  120  capsule  Refills:  11     valACYclovir 500 MG tablet  Commonly known as:  Valtrex  Used for:  Kidney replaced by transplant      Dose:  1,500 mg  Take 3 tablets (1,500 mg) by mouth 2 times daily  Quantity:  180 tablet  Refills:  2     Vitamin D3 25 mcg (1000 units) tablet  Commonly known as:  CHOLECALCIFEROL  Used for:  Kidney replaced by transplant      Dose:  25 mcg  Take 1 tablet (25 mcg) by mouth daily  Quantity:  30 tablet  Refills:  1         * This list has 2 medication(s) that are the same as other medications prescribed for you. Read the directions carefully, and ask your doctor or other care provider to review them with you.            CONTINUE these medicines which have NOT CHANGED       Dose / Directions   blood glucose monitoring lancets  Used for:  Kidney replaced by transplant      Use to test blood sugar 2 times daily or as directed.  Quantity:  100 each  Refills:  1     blood glucose test strip  Commonly known as:  Accu-Chek Guide  Used for:  Kidney replaced by transplant      Use to test blood sugar 2 times daily or as directed.  Quantity:  100 strip  Refills:  3     melatonin 1 MG Tabs tablet  Used for:  Heart transplanted (H)      Dose:  2 mg  Take 2 tablets (2 mg) by mouth nightly as needed for sleep  Quantity:  120 tablet  Refills:  1              Protect others around you: Learn how to safely use, store and throw away your medicines at www.disposemymeds.org.       Follow-ups after your visit       Your next 10 appointments already scheduled    Feb 06, 2020  Procedure with Marcelo Ramírez MD  [facility] [place],  Heart Cath Lab ([facility]) [address] [place] [id number] [phone]   The [facility] is located on the corner of [address] and [address] on the [address]. It is easily accessible from virtually any point in the [facility] area, via [employer] and [employer].   Feb 19, 2020 10:30 AM  CST  (Arrive by 10:15 AM)  Return Kidney Transplant with  Early Post Transplant  [facility] Nephrology ([facility]) [address]  [address]  [facility] [address]  [phone]      Feb 19, 2020 11:00 AM CST  (Arrive by 10:45 AM)  Office Visit with Eduardo Lea RPH  [facility] Medication Therapy Management ([facility]) [address]  [address]rd [facility] [address]  [phone]   Bring a current list of meds and any records pertaining to this visit.  For Physicals, please bring immunization records and any forms needing to be filled out. Please arrive 10 minutes early to complete paperwork.     Feb 24, 2020  2:45 PM CST  [facility] with Noel [facility]nos D[facility]  [facility] [address] [place] [address]  [phone]      Apr 21, 2020  9:30 AM CDT  Lab with  LAB   Health Lab ([facility]) [address] [place] [address]  [phone]      Apr 21, 2020 10:30 AM CDT  (Arrive by 10:15 AM)  Return Kidney Transplant with  Early Post Transplant  [facility] Nephrology ([facility]) [address] [place] [address]  [address]  [facility] [address]  [phone]      Apr 21, 2020 11:00 AM CDT  (Arrive by 10:45 AM)  [facility]) [address] [place] [address]  [phone]   Bring a current list of meds and any records pertaining to this visit.  For Physicals, please bring immunization records and any forms needing to be filled out. Please arrive 10 minutes early to complete paperwork.     Jun 22, 2020  9:30 AM CDT  Lab with  LAB   Health Lab ([facility]) [address] [place] [address]  [phone]      Jun 22, 2020  10:30 AM CDT  (Arrive by 10:15 AM)  Return Kidney Transplant with Uc Early Post Transplant  [facility] Nephrology ([facility]) [address]  [address]  [facility] [id number] [phone]      Jun 22, 2020  1:00 PM CDT  (Arrive by 12:45 PM)  SOT SOCIAL WORK EVAL with BLESSING Garvin  [facility] Solid Organ Transplant ([facility]) [address]  [address]  [facility] [id number] [phone]         Care Instructions       Further instructions from your care team       [facility]                        Interventional Cardiology  Discharge Instructions   Post Heart Biopsy      AFTER YOU GO HOME:    DO drink plenty of fluids    DO resume your regular diet and medications unless otherwise instructed by your Primary Physician    Do Not scrub the procedure site vigorously    No lotion or powder to the puncture site for 3 days    CALL YOUR PRIMARY PHYSICIAN IF: You may resume all normal activity.  Monitor neck site for bleeding, swelling, or voice changes. If you notice bleeding or swelling immediately apply pressure to the site and call number below to speak with Cardiology Fellow.  If you experience any changes in your breathing you should call your doctor immediately or come to the closest Emergency Department.  Do not drive yourself.    ADDITIONAL INSTRUCTIONS: Medications: You are to resume all home medications including anticoagulation therapy unless otherwise advised by your primary cardiologist or nurse coordinator.    Follow Up: Per your primary cardiology team    If you have any questions or concerns regarding your procedure site please call [phone] at anytime and ask for Cardiology Fellow on call.  They are available 24 hours a day.  You may also contact the Cardiology Clinic after hours number at [phone].                                                       Telephone Numbers [phone]  Monday-Friday 8:00 am to 4:30 pm    [phone] After 4:30 pm Monday-Friday, Weekends & Holidays  Ask for Interventional Cardiologist on call. Someone is on call 24 hours/day   [facility] toll free number [phone] Monday-Friday 8:00 am to 4:30 pm   [facility] Emergency Dept [phone]                   Additional Information About Your Visit       MyChart Information    [employer] gives you secure access to your electronic health record. If you see a primary care provider, you can also send messages to your care team and make appointments. If you have questions, please call your primary care clinic.  If you do not have a primary care provider, please call [phone] and they will assist you.       Care EveryWhere ID    This is your Care EveryWhere ID. This could be used by other organizations to access your [facility] medical records  [id number]       Your Vitals Were  Most recent update: 2/6/2020 10:04 AM    Blood Pressure   145/87   (BP Location: Right arm)    Pulse   91    Temperature   97.4  F (36.3  C) (Oral)    Respirations   16    Pulse Oximetry   100%          Primary Care Provider Office Phone # Fax #    Yahir Turcios [phone]      Equal Access to Services    JOHN HAUSER : Hadii marsha ku hadasho Soomaali, waaxda luqadaha, qaybta kaalmada adeegyada, waxay jay ayala. So [facility] [phone].    ATENCIÓN: Si habla español, tiene a ortiz disposición servicios gratuitos de asistencia lingüística. Llame al [phone].    We comply with applicable federal and state civil rights laws, including the [place] Human Rights Act. We do not discriminate on the basis of race, color, creed, Jainism, national origin, marital status, age, disability, sex, sexual orientation, or gender identity.       Thank you!    Thank you for choosing [facility] for your care. Our goal is always to provide you with excellent care. Hearing back from our patients is one way we can  continue to improve our services. Please take a few minutes to complete the written survey that you may receive in the mail after you visit with us. Thank you!            Medication List      Medications          Morning Afternoon Evening Bedtime As Needed    blood glucose monitoring lancets  INSTRUCTIONS:  Use to test blood sugar 2 times daily or as directed.                     blood glucose test strip  Also known as:  Accu-Chek Guide  INSTRUCTIONS:  Use to test blood sugar 2 times daily or as directed.                     melatonin 1 MG Tabs tablet  INSTRUCTIONS:  Take 2 tablets (2 mg) by mouth nightly as needed for sleep                       ASK your doctor about these medications          Morning Afternoon Evening Bedtime As Needed    acetaminophen 325 MG tablet  Also known as:  TYLENOL  INSTRUCTIONS:  Take 2 tablets (650 mg) by mouth every 4 hours as needed for other (multimodal surgical pain management along with NSAIDS and opioid medication as indicated based on pain control and physical function.)                     aspirin 81 MG EC tablet  INSTRUCTIONS:  Take 81 mg by mouth daily                     atorvastatin 40 MG tablet  Also known as:  Lipitor  INSTRUCTIONS:  Take 1 tablet (40 mg) by mouth daily                     fluticasone 50 MCG/ACT nasal spray  Also known as:  FLONASE  INSTRUCTIONS:  Spray 1 spray into both nostrils daily  Doctor's comments:  Okay to dispense generic equivalent, other formulation, or similar medication covered by patient's insurance                     HYDROmorphone 2 MG tablet  Also known as:  DILAUDID  INSTRUCTIONS:  Take 1 tablet (2 mg) by mouth every 4 hours as needed for moderate to severe pain                     insulin glargine 100 UNIT/ML pen  Also known as:  LANTUS PEN  INSTRUCTIONS:  Inject 10 Units Subcutaneous every morning Or as directed  Doctor's comments:  If Lantus is not covered by insurance, may substitute Basaglar at same dose and frequency.                        magnesium oxide 400 MG tablet  Also known as:  MAG-OX  INSTRUCTIONS:  Take 1 tablet (400 mg) by mouth daily (with lunch)                     mupirocin 2 % external ointment  Also known as:  BACTROBAN  INSTRUCTIONS:  Apply topically 3 times daily                     mycophenolate 250 MG capsule  Also known as:  GENERIC EQUIVALENT  INSTRUCTIONS:  Take 3 capsules (750 mg) by mouth 2 times daily                     ondansetron 4 MG ODT tab  Also known as:  ZOFRAN-ODT  INSTRUCTIONS:  Take 1 tablet (4 mg) by mouth every 6 hours as needed for nausea or vomiting                     pantoprazole 40 MG EC tablet  Also known as:  PROTONIX  INSTRUCTIONS:  Take 40 mg by mouth daily                     polyethylene glycol packet  Also known as:  MIRALAX/GLYCOLAX  INSTRUCTIONS:  Take 17 g by mouth daily                     senna-docusate 8.6-50 MG tablet  Also known as:  SENOKOT-S/PERICOLACE  INSTRUCTIONS:  Take 2 tablets by mouth 2 times daily                     sodium bicarbonate 650 MG tablet  INSTRUCTIONS:  Take 2 tablets (1,300 mg) by mouth 2 times daily                     sulfamethoxazole-trimethoprim 400-80 MG tablet  Also known as:  BACTRIM/SEPTRA  INSTRUCTIONS:  Take 1 tablet by mouth daily  Reason for med:  PCP prophylaxis                     * tacrolimus 0.5 MG capsule  Also known as:  GENERIC EQUIVALENT  INSTRUCTIONS:  Take 1 capsule (0.5 mg) by mouth 2 times daily HOLD  Doctor's comments:  TXP DT 12/19/2019 (Kidney), 6/14/2018 (Heart) TXP Dischg DT 12/22/2019 DX Kidney replaced by transplant Z94.0 [facility] [place] [place]                     * tacrolimus 1 MG capsule  Also known as:  GENERIC EQUIVALENT  INSTRUCTIONS:  Take 2 capsules (2 mg) by mouth 2 times daily  Doctor's comments:  TXP DT 12/19/2019 (Kidney), 6/14/2018 (Heart) TXP Dischg DT 12/22/2019 DX Kidney replaced by transplant  Z94.0 [facility] ([place] [place]) [facility] ([place] [place])                     valACYclovir 500 MG tablet  Also known as:  Valtrex  INSTRUCTIONS:  Take 3 tablets (1,500 mg) by mouth 2 times daily                     Vitamin D3 25 mcg (1000 units) tablet  Also known as:  CHOLECALCIFEROL  INSTRUCTIONS:  Take 1 tablet (25 mcg) by mouth daily                        * This list has 2 medication(s) that are the same as other medications prescribed for you. Read the directions carefully, and ask your doctor or other care provider to review them with you.

## 2020-02-06 NOTE — NURSING NOTE
Chief Complaint   Patient presents with     Follow Up     1 year 8 month post tx      Vitals were taken and medications were reconciled.     Darya Moulton RMA  8:56 AM

## 2020-02-06 NOTE — NURSING NOTE
Transplant Coordinator Note  Reason for visit:  1 yr 8 month follow up      Health concerns addressed today:  Pt seen in clinic NP Vikash. NP reviewed VS journal, meds and labs. Biopsy to follow. Renal lead IMS management due to recent kidney transplant. SBP stable 110-120s. NP noted swollen lymph nodes in neck. EBV pending. Pt denies any other symptoms of infection. Bought new electric bike; looking forward to riding it this spring. New helmet encouraged.        Immunosuppressants - Renal lead   mg BID   FK 2/2 mg; goal 8-10     No heart DSAs  Immuknow 258 (mod) 2019  Allomaps 34, 27, 38, 37, 32     Routine screenings:    Derm: Oct 2019  Dental: Ongoing   Colonoscopy: 2018 (diverticuli only)  Prostate: PSA 1.4  Eye: Ongoing   Flu/Pneumonia: 2019 flu shot done.     Medication record reviewed and reconciled. Questions and concerns addressed. AVS reviewed and copy provided. Pt verbalized an understanding of plan of care.      Patient Instructions  ~Please call your coordinator at [phone] with any questions or concerns.    ~Lillie will call with heart biopsy results and Allomap  ~Return to cardiology in June for annual   ~Congratulations on your new kidney!

## 2020-02-06 NOTE — LETTER
2/6/2020      RE: Murray Nicholson  [address]  [place] [address]       Dear Colleague,    Thank you for the opportunity to participate in the care of your patient, Murray Nicholson, at the [facility] at [facility]. Please see a copy of my visit note below.    [facility]    HPI:   Mr. Nicholson is a 65 year old male s/p recent orthotopic heart transplant who presents to clinic for routine follow-up. Patient has history of systolic heart failure 2/2 NICM,  HTN, dyslipidemia, DM2. Patient was initially listed for heart/kidney transplant, ultimately underwent heart transplant 6/14/18 and s/p renal transplant 12/19/19. He had a very complex postoperative course from heart transplant as below     1.  Status post orthotopic heart transplantation.   2.   Drug induced neutropenia.   3.  Oral mucosal ulcer secondary to neutropenia with Klebsiella infection.   4.  Pseudomonas bacteremia,.   5.  Perforation of the small bowel, status post small-bowel resection and ileostomy now take down  6.  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   7.  Hypertension.   8.  Hyperlipidemia.    9. Iincidental pneumoperitoneum  10.  End-stage renal disease requiring hemodialysis post heart transplant  11. Prior RIJ thrombus infected with CoNS.  12. Donor coronary artery disease.   13. S/p renal transplant LDKT 12/19/19    Biospies have been negative for rejection. Last RHC showed normal cardiac output and filling pressures, last echo with normal graft function. Baseline angiogram w/ donor coronary disease in all three coronaries arteries including 40% proxLAD lesion, 50% OM1, 80% OM2 lesion, and 20-40% RCA disease. Since last transplant clinic visit, he underwent LDKT on 12/19/19, post op course was unremarkable and he has had stable renal function.     Since he was seen last year reports no other changes.  He denies any fluid retention, exertional symptoms, lightheadedness.  Blood pressures  are controlled.  He denies fever, chills, abdominal pain, nausea, vomiting, diarrhea, rashes, mouth sores, night sweats.  He has noted weight loss that has been unintentional.        PAST MEDICAL HISTORY:  Past Medical History:   Diagnosis Date     (HFpEF) heart failure with preserved ejection fraction (H)      Allergic rhinitis, cause unspecified      Anemia of chronic kidney failure      AS (aortic stenosis)      Ascending aortic aneurysm (H)      Bicuspid aortic valve      CAD (coronary artery disease)      Congestive heart failure, unspecified      Dialysis patient (H)     Tues-Thur-Sat     Dyslipidemia      Esophageal reflux      ESRD (end stage renal disease) (H)      Hearing problem      Heart replaced by transplant (H) 12/10/2018     Hypersomnia with sleep apnea, unspecified      Hypertension      Ileostomy status (H)      Immunosuppression (H)      MGUS (monoclonal gammopathy of unknown significance)      Mitral regurgitation      SHEELA (obstructive sleep apnea)     No CPAP     Pneumonia      Systolic heart failure (H)      Type 2 diabetes mellitus (H)        FAMILY HISTORY:  Family History   Problem Relation Age of Onset     C.A.D. Father          from-never knew father-age 60     Diabetes Father      Cerebrovascular Disease Father      Hypertension Father      Hypertension No family hx of      Breast Cancer No family hx of      Cancer - colorectal No family hx of      Prostate Cancer No family hx of      Kidney Disease No family hx of      Melanoma No family hx of      Skin Cancer No family hx of        SOCIAL HISTORY:  , not currently working    CURRENT MEDICATIONS:  acetaminophen (TYLENOL) 325 MG tablet, Take 2 tablets (650 mg) by mouth every 4 hours as needed for other (multimodal surgical pain management along with NSAIDS and opioid medication as indicated based on pain control and physical function.)  aspirin 81 MG EC tablet, Take 81 mg by mouth daily  atorvastatin (LIPITOR) 40 MG tablet,  Take 1 tablet (40 mg) by mouth daily  blood glucose (ACCU-CHEK GUIDE) test strip, Use to test blood sugar 2 times daily or as directed.  blood glucose monitoring (ACCU-CHEK FASTCLIX) lancets, Use to test blood sugar 2 times daily or as directed.  fluticasone (FLONASE) 50 MCG/ACT nasal spray, Spray 1 spray into both nostrils daily (Patient taking differently: Spray 1 spray into both nostrils daily as needed )  HYDROmorphone (DILAUDID) 2 MG tablet, Take 1 tablet (2 mg) by mouth every 4 hours as needed for moderate to severe pain  insulin glargine (LANTUS PEN) 100 UNIT/ML pen, Inject 10 Units Subcutaneous every morning Or as directed  magnesium oxide (MAG-OX) 400 MG tablet, Take 1 tablet (400 mg) by mouth daily (with lunch)  melatonin 1 MG TABS tablet, Take 2 tablets (2 mg) by mouth nightly as needed for sleep  mupirocin (BACTROBAN) 2 % external ointment, Apply topically 3 times daily  mycophenolate (GENERIC EQUIVALENT) 250 MG capsule, Take 3 capsules (750 mg) by mouth 2 times daily  ondansetron (ZOFRAN-ODT) 4 MG ODT tab, Take 1 tablet (4 mg) by mouth every 6 hours as needed for nausea or vomiting  pantoprazole (PROTONIX) 40 MG EC tablet, Take 40 mg by mouth daily  polyethylene glycol (MIRALAX/GLYCOLAX) packet, Take 17 g by mouth daily  senna-docusate (SENOKOT-S/PERICOLACE) 8.6-50 MG tablet, Take 2 tablets by mouth 2 times daily  sodium bicarbonate 650 MG tablet, Take 2 tablets (1,300 mg) by mouth 2 times daily  sulfamethoxazole-trimethoprim (BACTRIM/SEPTRA) 400-80 MG tablet, Take 1 tablet by mouth daily  tacrolimus (GENERIC EQUIVALENT) 0.5 MG capsule, Take 1 capsule (0.5 mg) by mouth 2 times daily HOLD  tacrolimus (GENERIC EQUIVALENT) 1 MG capsule, Take 2 capsules (2 mg) by mouth 2 times daily  valACYclovir (VALTREX) 500 MG tablet, Take 3 tablets (1,500 mg) by mouth 2 times daily  Vitamin D3 (CHOLECALCIFEROL) 25 mcg (1000 units) tablet, Take 1 tablet (25 mcg) by mouth daily    diatrizoate meglumine-sodium  "(GASTROGRAFIN/GASTROVIEW) 66-10 % solution 480 mL        ROS:  CONSTITUTIONAL: See HPI  HEENT: Denies HA  CV: See HPI  PULMONARY: See HPI  GI: See HPI  : Denies urinary alterations, dysuria, urinary frequency, hematuria, and abnormal drainage.   EXT: Denies lower extremity edema or color changes.   SKIN: Denies abnormal rashes or lesions.   MUSCULOSKELETAL: Denies upper or lower extremity weakness and pain.   NEUROLOGIC: Denies lightheadedness, dizziness, seizures, or upper or lower extremity paresthesia.     EXAM:  /80 (BP Location: Right arm, Patient Position: Chair, Cuff Size: Adult Regular)   Pulse 94   Ht 1.734 m (5' 8.25\")   Wt 70.3 kg (155 lb)   SpO2 100%   BMI 23.40 kg/m        GENERAL: Appears comfortable, in no acute distress.   HEENT: Eye symmetrical, no discharge or icterus bilaterally. Mucous membranes moist. Bilateral enlarged cervical lymph nodes.   CV: RRR, +S1S2, no murmur, rub, or gallop. JVP not visible at 60 degrees.   RESPIRATORY: Respirations regular, even, and unlabored. Lungs CTA throughout.   GI: Soft and non distended with normoactive bowel sounds present in all quadrants. No tenderness, rebound, guarding. No hepatomegaly.   EXTREMITIES: No peripheral edema. 2+ bilateral pedal pulses.   NEUROLOGIC: Alert and oriented x 3. No focal deficits.   MUSCULOSKELETAL: No joint swelling or tenderness.   SKIN: No jaundice. No rashes or lesions.     Labs - reviewed with patient in clinic today:  CBC RESULTS:  Lab Results   Component Value Date    WBC 3.8 (L) 02/06/2020    RBC 3.24 (L) 02/06/2020    HGB 11.4 (L) 02/06/2020    HCT 33.2 (L) 02/06/2020     (H) 02/06/2020    MCH 35.2 (H) 02/06/2020    MCHC 34.3 02/06/2020    RDW 16.4 (H) 02/06/2020     02/06/2020       CMP RESULTS:  Lab Results   Component Value Date     02/06/2020    POTASSIUM 4.0 02/06/2020    CHLORIDE 108 02/06/2020    CO2 23 02/06/2020    ANIONGAP 6 02/06/2020     (H) 02/06/2020    BUN 22 " 02/06/2020    CR 1.12 02/06/2020    GFRESTIMATED 69 02/06/2020    GFRESTBLACK 79 02/06/2020    TRINI 8.9 02/06/2020    BILITOTAL 1.1 12/24/2019    ALBUMIN 3.8 12/24/2019    ALKPHOS 278 (H) 12/24/2019    ALT 18 12/24/2019    AST 15 12/24/2019        INR RESULTS:  Lab Results   Component Value Date    INR 1.15 (H) 12/10/2019       LIPID RESULTS:  Lab Results   Component Value Date    CHOL 124 12/24/2019    HDL 51 12/24/2019    LDL 30 12/24/2019    TRIG 218 (H) 12/24/2019    CHOLHDLRATIO 5.0 08/10/2015       IMMUNOSUPPRESSANT LEVELS:  Lab Results   Component Value Date    TACROL 10.7 02/03/2020    DOSTAC 02/02/20 0915pm 02/03/2020       No components found for: CK  Lab Results   Component Value Date    MAG 1.5 (L) 02/03/2020     Lab Results   Component Value Date    A1C 4.5 12/24/2019     Lab Results   Component Value Date    PHOS 4.2 02/03/2020     Lab Results   Component Value Date    NTBNP 15,996 (H) 11/08/2016     Lab Results   Component Value Date    SAITESTMET SA HI TA FCS 01/27/2020    SAICELL Class I 01/27/2020    [id number] None 01/27/2020    PS7GXOLWNV None 01/27/2020    SAIREPCOM  01/27/2020     Test performed by modified procedure. Serum heat inactivated, treated with   immunoadsorbent beads to remove interfering Thymoglobulin and tested by a   modified ([place]) protocol including fetal calf serum addition. High-risk,   mfi >3,000. Mod-risk, mfi 500-3,000.        Lab Results   Component Value Date    SAIITESTME SA HI TA FCS 01/27/2020    SAIICELL Class II 01/27/2020    [id number] None 01/27/2020    [id number] None 01/27/2020    SAIIREPCOM  01/27/2020     Test performed by modified procedure. Serum heat inactivated, treated with   immunoadsorbent beads to remove interfering Thymoglobulin and tested by a   modified ([place]) protocol including fetal calf serum addition. High-risk,   mfi >3,000. Mod-risk, mfi 500-3,000.        Lab Results   Component Value Date    CSPEC Plasma 01/30/2020       Diagnostic  Studies:  TTE 6/10/19  Normal biventricular function, chamber size, wall motion, and wall  thickness.The EF is 65-70%.  Normal RV size and function.  No hemodynamically significant valvular anomalies.  The inferior vena cava was normal in size with preserved respiratory  variability. Estimated mean right atrial pressure is 3 mmHg (normal).  No pericardial effusion is present.     This study was compared with the study from 12/3/2018. There has been no  change.    Cor angiogram 6/28/19  Left Anterior Descending   Prox LAD lesion is 40% stenosed.   First Diagonal Branch   The vessel is small.   Second Diagonal Branch   The vessel is moderate in size.   Third Diagonal Branch   The vessel is small.   Left Circumflex   First Obtuse Marginal Branch   The vessel is moderate in size.   1st Mrg lesion is 50% stenosed.   Second Obtuse Marginal Branch   The vessel is moderate in size.   2nd Mrg-1 lesion is 80% stenosed.   2nd Mrg-2 lesion is 80% stenosed.   Third Obtuse Marginal Branch   The vessel is moderate in size.   Right Coronary Artery   Prox RCA lesion is 20% stenosed.   Dist RCA lesion is 40% stenosed.   Right Posterior Descending Artery   RPDA lesion is 20% stenosed.     RHC 6/28/19  RA 9  PA 35/16(24)  PCWP 14  Td CO/CI 9.4/5    Assessment/Plan:  Mr. Nicholson is a 65 year old male who is s/p orthotopic heart transplant on 6/14/18 who presents to clinic for follow-up after recent renal transplant. Post-op course was complicated leukocytosis for which he received abx, RIJ thrombus infected with CoNS, incidental pneumoperitoneum, necrotic mouth sore with klebsiella and pseudomonas bacteremia, and perforation of the small bowel, status post small-bowel resection and ileostomy now s/p uncomplicated renal transplant on 12/19/19. From a cardiac standpoint, he has been stable with normal graft function, filling pressures, and hemodynamics. He has no DSAs. Does have donor CAD.     # Status post OHT on 6/14/18, history of  "NICM  # Donor 3 vessel CAD  # Chronic immunosuppression  * Rejection history: none.   * Recent immunosuppression changes: on higher IS for renal transplant   * Last biopsy: 10/28/19 negative  * Intolerance to medications: none    Immunosuppression:  - Tacrolimus trough level goal 8-10 per renal transplant   - Cellcept 750 mg bid     Prophylaxis:  - PPI: pantoprazole 40 mg daily  - CAV/CAD: ASA 81 mg and atorvastatin 40 mg (Crestor cost prohibitive), per last angiogram may benefit from lifelong dapt, will defer to Dr Ernst  - CMV high risk D+ R-: Valtrex 1500 mg bid x3 months (kidney D+/R+)   - PCP: on bactrim daily indefinitely per renal tx     # Hx of LDKT 12/19/19. Followed closely by renal Tx.   # HTN. 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 Last MRI 4/2019 showed \"numerous pancreatic cystic foci are similar to those seen on 4/19/2018 with minimal increase in size of one of these cystic foci arising from the pancreatic body (now measuring 14 mm, previously 13 mm). These remain most consistent with side branch intraductal papillary mucinous neoplasms. No suspicious features on this unenhanced exam.\" Abdominal MRI q1-2 years per GI.        25 minutes spent face-to-face with patient, >50% in counseling and/or coordination of care as described above      Ramya Suh DNP, NP-C  2/6/2020    "

## 2020-02-06 NOTE — PATIENT INSTRUCTIONS
Please call your coordinator at [phone] with any questions or concerns.      Lillie will call with heart biopsy results and [facility]    Return to cardiology in June for annual     Congratulations on your new kidney!

## 2020-02-06 NOTE — DISCHARGE INSTRUCTIONS
[facility]                        Interventional Cardiology  Discharge Instructions   Post Heart Biopsy      AFTER YOU GO HOME:    DO drink plenty of fluids    DO resume your regular diet and medications unless otherwise instructed by your Primary Physician    Do Not scrub the procedure site vigorously    No lotion or powder to the puncture site for 3 days    CALL YOUR PRIMARY PHYSICIAN IF: You may resume all normal activity.  Monitor neck site for bleeding, swelling, or voice changes. If you notice bleeding or swelling immediately apply pressure to the site and call number below to speak with Cardiology Fellow.  If you experience any changes in your breathing you should call your doctor immediately or come to the closest Emergency Department.  Do not drive yourself.    ADDITIONAL INSTRUCTIONS: Medications: You are to resume all home medications including anticoagulation therapy unless otherwise advised by your primary cardiologist or nurse coordinator.    Follow Up: Per your primary cardiology team    If you have any questions or concerns regarding your procedure site please call [phone] at anytime and ask for Cardiology Fellow on call.  They are available 24 hours a day.  You may also contact the Cardiology Clinic after hours number at [phone].                                                       Telephone Numbers [phone] Monday-Friday 8:00 am to 4:30 pm    [phone] After 4:30 pm Monday-Friday, Weekends & Holidays  Ask for Interventional Cardiologist on call. Someone is on call 24 hours/day   [facility] toll free number [phone] Monday-Friday 8:00 am to 4:30 pm   [facility] Emergency Dept [phone]

## 2020-02-07 DIAGNOSIS — Z94.0 KIDNEY REPLACED BY TRANSPLANT: ICD-10-CM

## 2020-02-07 DIAGNOSIS — Z79.899 LONG TERM USE OF DRUG: ICD-10-CM

## 2020-02-07 LAB
COPATH REPORT: NORMAL
EBV DNA # SPEC NAA+PROBE: NORMAL {COPIES}/ML
EBV DNA SPEC NAA+PROBE-LOG#: NORMAL {LOG_COPIES}/ML
TACROLIMUS BLD-MCNC: 9.9 UG/L (ref 5–15)
TME LAST DOSE: NORMAL H

## 2020-02-07 PROCEDURE — 80197 ASSAY OF TACROLIMUS: CPT | Performed by: INTERNAL MEDICINE

## 2020-02-10 ENCOUNTER — TRANSFERRED RECORDS (OUTPATIENT)
Dept: HEALTH INFORMATION MANAGEMENT | Facility: CLINIC | Age: 65
End: 2020-02-10

## 2020-02-10 DIAGNOSIS — Z94.0 KIDNEY REPLACED BY TRANSPLANT: ICD-10-CM

## 2020-02-10 DIAGNOSIS — Z94.0 KIDNEY REPLACED BY TRANSPLANT: Primary | ICD-10-CM

## 2020-02-10 DIAGNOSIS — Z79.899 LONG TERM USE OF DRUG: ICD-10-CM

## 2020-02-10 LAB
ALLOMAP SCORE (EXTERNAL): 34
ALLOSURE DD-CFDNA: NORMAL
ANION GAP SERPL CALCULATED.3IONS-SCNC: 6 MMOL/L (ref 3–14)
BUN SERPL-MCNC: 20 MG/DL (ref 7–30)
CALCIUM SERPL-MCNC: 8.9 MG/DL (ref 8.5–10.1)
CHLORIDE SERPL-SCNC: 109 MMOL/L (ref 94–109)
CO2 SERPL-SCNC: 24 MMOL/L (ref 20–32)
CREAT SERPL-MCNC: 0.95 MG/DL (ref 0.66–1.25)
ERYTHROCYTE [DISTWIDTH] IN BLOOD BY AUTOMATED COUNT: 16.9 % (ref 10–15)
GFR SERPL CREATININE-BSD FRML MDRD: 84 ML/MIN/{1.73_M2}
GLUCOSE SERPL-MCNC: 214 MG/DL (ref 70–99)
HCT VFR BLD AUTO: 33.1 % (ref 40–53)
HGB BLD-MCNC: 11.4 G/DL (ref 13.3–17.7)
MAGNESIUM SERPL-MCNC: 1.4 MG/DL (ref 1.6–2.3)
MCH RBC QN AUTO: 35.1 PG (ref 26.5–33)
MCHC RBC AUTO-ENTMCNC: 34.4 G/DL (ref 31.5–36.5)
MCV RBC AUTO: 102 FL (ref 78–100)
NEGATIVE PREDICTIVE VALUE PERCENT (EXTERNAL): 98.9 %
PHOSPHATE SERPL-MCNC: 4.2 MG/DL (ref 2.5–4.5)
PLATELET # BLD AUTO: 150 10E9/L (ref 150–450)
POSITIVE PREDICTIVE VALUE PERCENT (EXTERNAL): 4.1 %
POTASSIUM SERPL-SCNC: 3.9 MMOL/L (ref 3.4–5.3)
RBC # BLD AUTO: 3.25 10E12/L (ref 4.4–5.9)
SODIUM SERPL-SCNC: 139 MMOL/L (ref 133–144)
TACROLIMUS BLD-MCNC: 10.8 UG/L (ref 5–15)
TME LAST DOSE: NORMAL H
WBC # BLD AUTO: 3.3 10E9/L (ref 4–11)

## 2020-02-10 PROCEDURE — 87799 DETECT AGENT NOS DNA QUANT: CPT | Performed by: INTERNAL MEDICINE

## 2020-02-10 PROCEDURE — 80197 ASSAY OF TACROLIMUS: CPT | Performed by: INTERNAL MEDICINE

## 2020-02-10 RX ORDER — MAGNESIUM OXIDE 400 MG/1
800 TABLET ORAL
Qty: 60 TABLET | Refills: 2 | Status: SHIPPED | OUTPATIENT
Start: 2020-02-10 | End: 2020-06-02

## 2020-02-10 NOTE — TELEPHONE ENCOUNTER
ISSUE:  Mag level 1.4, decreased from previous.    PLAN:  Ensure Murray is taking mag ox 400mg daily as prescribed.  If so, and no recent issues with diarrhea, INCREASE mag ox to 800mg daily.  Increase magnesium in diet: Best sources are dark green vegetables, legumes, cereals, wheat bread, fish, and nuts.

## 2020-02-10 NOTE — TELEPHONE ENCOUNTER
Spoke to patient who confirms current Mag dose of 400 mg daily and verbalizes understanding to increase Mag dose to 800 mg daily.

## 2020-02-12 LAB
BKV DNA # SPEC NAA+PROBE: NORMAL COPIES/ML
BKV DNA SPEC NAA+PROBE-LOG#: NORMAL LOG COPIES/ML
SPECIMEN SOURCE: NORMAL

## 2020-02-13 DIAGNOSIS — Z94.0 KIDNEY REPLACED BY TRANSPLANT: ICD-10-CM

## 2020-02-13 DIAGNOSIS — Z79.899 LONG TERM USE OF DRUG: ICD-10-CM

## 2020-02-13 DIAGNOSIS — E87.20 METABOLIC ACIDOSIS: ICD-10-CM

## 2020-02-13 DIAGNOSIS — Z94.0 KIDNEY REPLACED BY TRANSPLANT: Primary | ICD-10-CM

## 2020-02-13 LAB
TACROLIMUS BLD-MCNC: 11.2 UG/L (ref 5–15)
TME LAST DOSE: 2120 H

## 2020-02-13 PROCEDURE — 80197 ASSAY OF TACROLIMUS: CPT | Performed by: INTERNAL MEDICINE

## 2020-02-13 RX ORDER — TACROLIMUS 1 MG/1
2 CAPSULE ORAL 2 TIMES DAILY
Qty: 120 CAPSULE | Refills: 11 | Status: SHIPPED | OUTPATIENT
Start: 2020-02-13 | End: 2020-02-21

## 2020-02-13 RX ORDER — TACROLIMUS 0.5 MG/1
0.5 CAPSULE ORAL EVERY MORNING
Qty: 30 CAPSULE | Refills: 11 | Status: SHIPPED | OUTPATIENT
Start: 2020-02-13 | End: 2020-02-21

## 2020-02-13 NOTE — TELEPHONE ENCOUNTER
ISSUE:   Tacrolimus IR level 11.3 on 2/13/2020, goal 8-10, dose 2 mg BID.    PLAN:   Please call patient and confirm this was an accurate 12-hour trough. Verify Tacrolimus IR dose 2 mg BID. Confirm no new medications or illness. Confirm no missed doses. If accurate trough and accurate dose, decrease Tacrolimus IR dose to 2mg AM, 1.5 mg PM and repeat labs as scheduled.    Cami Villarreal RN      OUTCOME:   Spoke with patient, they confirm accurate trough level and current dose 2.5 mg BID. Patient confirmed dose change to 2.5/2 mg BID and to repeat labs in 4 days. Orders sent to preferred pharmacy for dose change and lab for repeat labs. Patient voiced understanding of plan.

## 2020-02-14 ENCOUNTER — TELEPHONE (OUTPATIENT)
Dept: TRANSPLANT | Facility: CLINIC | Age: 65
End: 2020-02-14

## 2020-02-14 NOTE — TELEPHONE ENCOUNTER
Mellisa Suh, APRN CNP  MariannetLillie, RN             I talked to TT about his lymph nodes I thought I felt. He agrees to monitor. [facility] renal that day to follow-ip? We should do EBV that day. And have him call us with any more weight loss, night sweats, fatigue, enlarging lymph nodes that would warrant CTs.      Appt requested to be scheduled. LM for pt with instructions to call with questions or any concerns re symptoms.

## 2020-02-17 DIAGNOSIS — Z79.899 LONG TERM USE OF DRUG: ICD-10-CM

## 2020-02-17 DIAGNOSIS — Z94.0 KIDNEY REPLACED BY TRANSPLANT: ICD-10-CM

## 2020-02-17 LAB
ANION GAP SERPL CALCULATED.3IONS-SCNC: 6 MMOL/L (ref 3–14)
BUN SERPL-MCNC: 22 MG/DL (ref 7–30)
CALCIUM SERPL-MCNC: 8.9 MG/DL (ref 8.5–10.1)
CHLORIDE SERPL-SCNC: 107 MMOL/L (ref 94–109)
CO2 SERPL-SCNC: 22 MMOL/L (ref 20–32)
CREAT SERPL-MCNC: 0.96 MG/DL (ref 0.66–1.25)
ERYTHROCYTE [DISTWIDTH] IN BLOOD BY AUTOMATED COUNT: 17.1 % (ref 10–15)
GFR SERPL CREATININE-BSD FRML MDRD: 83 ML/MIN/{1.73_M2}
GLUCOSE SERPL-MCNC: 196 MG/DL (ref 70–99)
HCT VFR BLD AUTO: 35.1 % (ref 40–53)
HGB BLD-MCNC: 12 G/DL (ref 13.3–17.7)
MAGNESIUM SERPL-MCNC: 1.6 MG/DL (ref 1.6–2.3)
MCH RBC QN AUTO: 35.1 PG (ref 26.5–33)
MCHC RBC AUTO-ENTMCNC: 34.2 G/DL (ref 31.5–36.5)
MCV RBC AUTO: 103 FL (ref 78–100)
PHOSPHATE SERPL-MCNC: 4.2 MG/DL (ref 2.5–4.5)
PLATELET # BLD AUTO: 160 10E9/L (ref 150–450)
POTASSIUM SERPL-SCNC: 4.1 MMOL/L (ref 3.4–5.3)
RBC # BLD AUTO: 3.42 10E12/L (ref 4.4–5.9)
SODIUM SERPL-SCNC: 136 MMOL/L (ref 133–144)
TACROLIMUS BLD-MCNC: 8.8 UG/L (ref 5–15)
TME LAST DOSE: NORMAL H
WBC # BLD AUTO: 3.5 10E9/L (ref 4–11)

## 2020-02-17 PROCEDURE — 80197 ASSAY OF TACROLIMUS: CPT | Performed by: INTERNAL MEDICINE

## 2020-02-18 DIAGNOSIS — Z94.1 HEART TRANSPLANT RECIPIENT (H): ICD-10-CM

## 2020-02-19 ENCOUNTER — TELEPHONE (OUTPATIENT)
Dept: TRANSPLANT | Facility: CLINIC | Age: 65
End: 2020-02-19

## 2020-02-19 ENCOUNTER — OFFICE VISIT (OUTPATIENT)
Dept: NEPHROLOGY | Facility: CLINIC | Age: 65
End: 2020-02-19
Attending: INTERNAL MEDICINE
Payer: MEDICARE

## 2020-02-19 ENCOUNTER — OFFICE VISIT (OUTPATIENT)
Dept: PHARMACY | Facility: CLINIC | Age: 65
End: 2020-02-19
Payer: COMMERCIAL

## 2020-02-19 VITALS
DIASTOLIC BLOOD PRESSURE: 82 MMHG | OXYGEN SATURATION: 100 % | SYSTOLIC BLOOD PRESSURE: 133 MMHG | WEIGHT: 156.57 LBS | HEART RATE: 102 BPM | BODY MASS INDEX: 23.81 KG/M2

## 2020-02-19 DIAGNOSIS — K21.9 GASTROESOPHAGEAL REFLUX DISEASE WITHOUT ESOPHAGITIS: ICD-10-CM

## 2020-02-19 DIAGNOSIS — I15.1 HTN, KIDNEY TRANSPLANT RELATED: ICD-10-CM

## 2020-02-19 DIAGNOSIS — D84.9 IMMUNOSUPPRESSION (H): ICD-10-CM

## 2020-02-19 DIAGNOSIS — E87.20 METABOLIC ACIDOSIS: ICD-10-CM

## 2020-02-19 DIAGNOSIS — Z94.0 HTN, KIDNEY TRANSPLANT RELATED: ICD-10-CM

## 2020-02-19 DIAGNOSIS — Z94.0 KIDNEY REPLACED BY TRANSPLANT: Primary | ICD-10-CM

## 2020-02-19 DIAGNOSIS — E11.29 TYPE 2 DIABETES MELLITUS WITH OTHER DIABETIC KIDNEY COMPLICATION, WITHOUT LONG-TERM CURRENT USE OF INSULIN (H): ICD-10-CM

## 2020-02-19 DIAGNOSIS — D63.1 ANEMIA OF CHRONIC RENAL FAILURE, STAGE 2 (MILD): ICD-10-CM

## 2020-02-19 DIAGNOSIS — E83.42 HYPOMAGNESEMIA: ICD-10-CM

## 2020-02-19 DIAGNOSIS — N18.2 ANEMIA OF CHRONIC RENAL FAILURE, STAGE 2 (MILD): ICD-10-CM

## 2020-02-19 DIAGNOSIS — R52 PAIN: ICD-10-CM

## 2020-02-19 DIAGNOSIS — E11.9 TYPE 2 DIABETES MELLITUS (H): ICD-10-CM

## 2020-02-19 DIAGNOSIS — E78.5 HYPERLIPIDEMIA LDL GOAL <100: ICD-10-CM

## 2020-02-19 PROCEDURE — 99207 ZZC NO CHARGE LOS: CPT | Performed by: PHARMACIST

## 2020-02-19 PROCEDURE — G0463 HOSPITAL OUTPT CLINIC VISIT: HCPCS | Mod: ZF

## 2020-02-19 ASSESSMENT — PAIN SCALES - GENERAL: PAINLEVEL: NO PAIN (0)

## 2020-02-19 NOTE — LETTER
2/19/2020       RE: Murray Nicholson  [address]  [place] [address]     Dear Colleague,    Thank you for referring your patient, Murray Nicholson, to the [facility] NEPHROLOGY at [facility]. Please see a copy of my visit note below.    ACUTE TRANSPLANT NEPHROLOGY VISIT    Assessment & Plan   # LDKT: Stable   - Baseline Cr ~ 0.9 mg / dl   - Proteinuria: Minimal (0.2-0.5 grams)   - Date DSA Last Checked: none on 1/27/20   - BK Viremia: none on 2/10.   - Kidney Tx Biopsy: No    # Immunosuppression: Tacrolimus immediate release (goal 8-10) and Mycophenolate mofetil (goal 1.0-3.5)   - Changes: No    # Infection Prophylaxis:   - PJP: Sulfa/TMP (Bactrim)  - CMV: Valtrex    # Hypertension: Controlled;  Goal BP: < 130/80   - Volume status: Euvolemic     - Changes: No     #DMII: per primary.     # Anemia in Chronic Renal Disease: Hgb: Stable      ANASTASIYA: Yes   - Iron studies: Not checked recently    # Mineral Bone Disorder:   - Secondary renal hyperparathyroidism; PTH level: Mildly elevated (151-300 pg/ml)        - Vitamin D; level: Normal        On Supplement: Yes  - Calcium; level: Normal         - Phosphorus; level: Normal        On Supplement: No    # Electrolytes:   - Potassium; level: Normal          - Magnesium; level: Normal        On Supplement: Yes  - Bicarbonate; level: Normal        On Supplement: Yes  - Sodium; level: Normal            # Medical Compliance: Yes    # Transplant History:  Etiology of Kidney Failure: Diabetes mellitus type 2  Tx: LDKT  Transplant: 12/19/2019 (Kidney), 6/14/2018 (Heart)  Donor Type: Living Donor Class:   Crossmatch at time of Tx: negative  DSA at time of Tx: No  Significant changes in immunosuppression: None  CMV IgG Ab High Risk Discordance (D+/R-): No  EBV IgG Ab High Risk Discordance (D+/R-): No  Significant transplant-related complications: None    Transplant Office Phone Number: [phone]    Assessment and plan was discussed with the  patient and he voiced his understanding and agreement.    Return visit: 2 months.   Bobby Escobar MD    Chief Complaint   Mr. Nicholson is a 65 year old here for routine follow up and kidney transplant.     History of Present Illness      The patient is a 65-year-old male with history of end-stage kidney disease due to type 2 diabetes who is status post living kidney transplant on 12/19/2019 after a heart transplant from 2018 here for follow-up of his kidney transplant.    Overall the patient feels well.  He denies any chest pain or breathing difficulties.  He has no nausea or vomiting.  He has no fever shakes or chills.  He is moving his bowels appropriately.    The patient's blood pressure is reasonably well-controlled at 133/82 today.  Long-term I told the patient that his blood pressure goal would likely be less than 130/80.    From a kidney transplant perspective, the patient's allograft is functioning well with a baseline creatinine of 0.9 mg/dL.  The patient continues on immunosuppression with tacrolimus and CellCept.  His tacrolimus goal is 8 to 10 ng/mL.    Recent Hospitalizations:  [x] No [] Yes    New Medical Issues: [x] No [] Yes    Decreased energy: [x] No [] Yes    Chest pain or SOB with exertion:  [x] No [] Yes    Appetite change or weight change: [x] No [] Yes    Nausea, vomiting or diarrhea:  [x] No [] Yes    Fever, sweats or chills: [x] No [] Yes    Leg swelling: [x] No [] Yes      Home BP: at goal.    Review of Systems   A comprehensive review of systems was obtained and negative, except as noted in the HPI or PMH.    Problem List   Patient Active Problem List   Diagnosis     Esophageal reflux     Allergic rhinitis     Anemia in chronic renal disease     Coronary artery disease involving native artery of transplanted heart without angina pectoris     Dyslipidemia     MGUS (monoclonal gammopathy of unknown significance)     Ascending aortic aneurysm (H)     Leukopenia     Sigmoid diverticulitis      Heart replaced by transplant (H)     Aortic stenosis     Status post coronary angiogram     Immunosuppression (H)     Type 2 diabetes mellitus (H)     Pancreatic cyst     Kidney replaced by transplant     Aftercare following organ transplant     HTN, kidney transplant related     Secondary renal hyperparathyroidism (H)     Vitamin D deficiency     Hypomagnesemia     SHEELA on CPAP       Social History   Social History     Tobacco Use     Smoking status: Former Smoker     Packs/day: 1.00     Years: 20.00     Pack years: 20.00     Types: Cigarettes     Start date: 1984     Last attempt to quit: 1994     Years since quittin.5     Smokeless tobacco: Never Used   Substance Use Topics     Alcohol use: No     Alcohol/week: 0.0 standard drinks     Drug use: No     Allergies   Allergies   Allergen Reactions     Norco [Hydrocodone-Acetaminophen] Nausea and Vomiting     Cats      Throat tightness     Isosorbide Other (See Comments)     hypotension     Penicillins Hives     Seasonal Allergies      rhinitis     Shrimp      Throat closes      Medications   Current Outpatient Medications   Medication Sig     acetaminophen (TYLENOL) 325 MG tablet Take 2 tablets (650 mg) by mouth every 4 hours as needed for other (multimodal surgical pain management along with NSAIDS and opioid medication as indicated based on pain control and physical function.)     aspirin 81 MG EC tablet Take 81 mg by mouth daily     atorvastatin (LIPITOR) 40 MG tablet Take 1 tablet (40 mg) by mouth daily     blood glucose (ACCU-CHEK GUIDE) test strip Use to test blood sugar 2 times daily or as directed.     blood glucose monitoring (ACCU-CHEK FASTCLIX) lancets Use to test blood sugar 2 times daily or as directed.     fluticasone (FLONASE) 50 MCG/ACT nasal spray Spray 1 spray into both nostrils daily (Patient taking differently: Spray 1 spray into both nostrils daily as needed )     HYDROmorphone (DILAUDID) 2 MG tablet Take 1 tablet (2 mg) by mouth  every 4 hours as needed for moderate to severe pain     insulin glargine (LANTUS PEN) 100 UNIT/ML pen Inject 10 Units Subcutaneous every morning Or as directed     magnesium oxide (MAG-OX) 400 MG tablet Take 2 tablets (800 mg) by mouth daily (with lunch)     melatonin 1 MG TABS tablet Take 2 tablets (2 mg) by mouth nightly as needed for sleep     mupirocin (BACTROBAN) 2 % external ointment Apply topically 3 times daily     mycophenolate (GENERIC EQUIVALENT) 250 MG capsule Take 3 capsules (750 mg) by mouth 2 times daily     pantoprazole (PROTONIX) 40 MG EC tablet Take 40 mg by mouth daily     polyethylene glycol (MIRALAX/GLYCOLAX) packet Take 17 g by mouth daily     senna-docusate (SENOKOT-S/PERICOLACE) 8.6-50 MG tablet Take 2 tablets by mouth 2 times daily     sodium bicarbonate 650 MG tablet Take 2 tablets (1,300 mg) by mouth 2 times daily     sulfamethoxazole-trimethoprim (BACTRIM/SEPTRA) 400-80 MG tablet Take 1 tablet by mouth daily     tacrolimus (GENERIC EQUIVALENT) 0.5 MG capsule Take 1 capsule (0.5 mg) by mouth every morning Total dose = 2.5 mg in the AM and 2 mg in the PM     tacrolimus (GENERIC EQUIVALENT) 1 MG capsule Take 2 capsules (2 mg) by mouth 2 times daily Total dose = 2.5. mg in the AM and 2 mg in the PM     valACYclovir (VALTREX) 500 MG tablet Take 3 tablets (1,500 mg) by mouth 2 times daily     Vitamin D3 (CHOLECALCIFEROL) 25 mcg (1000 units) tablet Take 1 tablet (25 mcg) by mouth daily     No current facility-administered medications for this visit.      Facility-Administered Medications Ordered in Other Visits   Medication     diatrizoate meglumine-sodium (GASTROGRAFIN/GASTROVIEW) 66-10 % solution 480 mL     There are no discontinued medications.    Physical Exam   Vital Signs: /82   Pulse 102   Wt 71 kg (156 lb 9.1 oz)   SpO2 100%   BMI 23.81 kg/m       GENERAL APPEARANCE: alert and no distress  HENT: mouth without ulcers or lesions  LYMPHATICS: no cervical or supraclavicular  nodes  RESP: lungs clear to auscultation - no rales, rhonchi or wheezes  CV: regular rhythm, normal rate, no rub, no murmur  EDEMA: no LE edema bilaterally  ABDOMEN: soft, nondistended, nontender, bowel sounds normal  MS: extremities normal - no gross deformities noted, no evidence of inflammation in joints, no muscle tenderness  SKIN: no rash    Data     Renal Latest Ref Rng & Units 2/17/2020 2/10/2020 2/6/2020   Na 133 - 144 mmol/L 136 139 137   K 3.4 - 5.3 mmol/L 4.1 3.9 4.0   Cl 94 - 109 mmol/L 107 109 108   CO2 20 - 32 mmol/L 22 24 23   BUN 7 - 30 mg/dL 22 20 22   Cr 0.66 - 1.25 mg/dL 0.96 0.95 1.12   Glucose 70 - 99 mg/dL 196(H) 214(H) 192(H)   Ca  8.5 - 10.1 mg/dL 8.9 8.9 8.9   Mg 1.6 - 2.3 mg/dL 1.6 1.4(L) 1.6     Bone Health Latest Ref Rng & Units 2/17/2020 2/10/2020 2/6/2020   Phos 2.5 - 4.5 mg/dL 4.2 4.2 4.2   PTHi 18 - 80 pg/mL - - -   Vit D Def 20 - 75 ug/L - - -     Heme Latest Ref Rng & Units 2/17/2020 2/10/2020 2/6/2020   WBC 4.0 - 11.0 10e9/L 3.5(L) 3.3(L) 3.8(L)   Hgb 13.3 - 17.7 g/dL 12.0(L) 11.4(L) 11.4(L)   Plt 150 - 450 10e9/L 160 150 165     Liver Latest Ref Rng & Units 12/24/2019 12/10/2019 10/28/2019   AP 40 - 150 U/L 278(H) 372(H) 318(H)   TBili 0.2 - 1.3 mg/dL 1.1 1.0 0.7   DBili 0.0 - 0.2 mg/dL 0.2 - -   ALT 0 - 70 U/L 18 16 14   AST 0 - 45 U/L 15 11 11   Tot Protein 6.8 - 8.8 g/dL 7.1 7.6 7.6   Albumin 3.4 - 5.0 g/dL 3.8 4.0 3.8     Pancreas Latest Ref Rng & Units 12/24/2019 12/20/2019 12/10/2019   A1C 0 - 5.6 % 4.5 4.3 5.2   Amylase 30 - 110 U/L - - -     Iron studies Latest Ref Rng & Units 12/10/2019 6/10/2019 7/10/2018   Iron 35 - 180 ug/dL 84 118 66   Iron sat 15 - 46 % 35 47(H) 28   Ferritin 26 - 388 ng/mL 445(H) 644(H) 771(H)     [facility] Txp Virology Latest Ref Rng & Units 2/10/2020 2/3/2020 1/30/2020   CVM DNA Quant - - - Plasma   BK Spec - Plasma Plasma, EDTA anticoagulant -   BK Res BKNEG:BK Virus DNA Not Detected copies/mL BK Virus DNA Not Detected BK Virus DNA Not Detected -   BK  Log <2.7 Log copies/mL Not Calculated Not Calculated -   Hep B Core NR:Nonreactive - - -        Recent Labs   Lab Test 02/10/20  0955 02/13/20  0919 02/17/20  0937   DOSTAC 2/9/20 2120 2,120 02/16/2020 09:20PM    TACROL 10.8 11.2 8.8     Recent Labs   Lab Test 12/26/19  0720 01/27/20  0918 02/03/20  0948   DOSMPA Not Provided NTP 02/02/20 0915pm   MPACID 1.65 2.39 2.41   MPAG 58.9 54.6 50.4       Again, thank you for allowing me to participate in the care of your patient.      Sincerely,    [facility]

## 2020-02-19 NOTE — PROGRESS NOTES
ACUTE TRANSPLANT NEPHROLOGY VISIT    Assessment & Plan   # LDKT: Stable   - Baseline Cr ~ 0.9 mg / dl   - Proteinuria: Minimal (0.2-0.5 grams)   - Date DSA Last Checked: none on 1/27/20   - BK Viremia: none on 2/10.   - Kidney Tx Biopsy: No    # Immunosuppression: Tacrolimus immediate release (goal 8-10) and Mycophenolate mofetil (goal 1.0-3.5)   - Changes: No    # Infection Prophylaxis:   - PJP: Sulfa/TMP (Bactrim)  - CMV: Valtrex    # Hypertension: Controlled;  Goal BP: < 130/80   - Volume status: Euvolemic     - Changes: No     #DMII: per primary.     # Anemia in Chronic Renal Disease: Hgb: Stable      ANASTASIYA: Yes   - Iron studies: Not checked recently    # Mineral Bone Disorder:   - Secondary renal hyperparathyroidism; PTH level: Mildly elevated (151-300 pg/ml)        - Vitamin D; level: Normal        On Supplement: Yes  - Calcium; level: Normal         - Phosphorus; level: Normal        On Supplement: No    # Electrolytes:   - Potassium; level: Normal          - Magnesium; level: Normal        On Supplement: Yes  - Bicarbonate; level: Normal        On Supplement: Yes  - Sodium; level: Normal            # Medical Compliance: Yes    # Transplant History:  Etiology of Kidney Failure: Diabetes mellitus type 2  Tx: LDKT  Transplant: 12/19/2019 (Kidney), 6/14/2018 (Heart)  Donor Type: Living Donor Class:   Crossmatch at time of Tx: negative  DSA at time of Tx: No  Significant changes in immunosuppression: None  CMV IgG Ab High Risk Discordance (D+/R-): No  EBV IgG Ab High Risk Discordance (D+/R-): No  Significant transplant-related complications: None    Transplant Office Phone Number: [phone]    Assessment and plan was discussed with the patient and he voiced his understanding and agreement.    Return visit: 2 months.   Bobby Escobar MD    Chief Complaint   Mr. Nicholson is a 65 year old here for routine follow up and kidney transplant.     History of Present Illness      The patient is a 65-year-old male with  history of end-stage kidney disease due to type 2 diabetes who is status post living kidney transplant on 12/19/2019 after a heart transplant from 2018 here for follow-up of his kidney transplant.    Overall the patient feels well.  He denies any chest pain or breathing difficulties.  He has no nausea or vomiting.  He has no fever shakes or chills.  He is moving his bowels appropriately.    The patient's blood pressure is reasonably well-controlled at 133/82 today.  Long-term I told the patient that his blood pressure goal would likely be less than 130/80.    From a kidney transplant perspective, the patient's allograft is functioning well with a baseline creatinine of 0.9 mg/dL.  The patient continues on immunosuppression with tacrolimus and CellCept.  His tacrolimus goal is 8 to 10 ng/mL.    Recent Hospitalizations:  [x] No [] Yes    New Medical Issues: [x] No [] Yes    Decreased energy: [x] No [] Yes    Chest pain or SOB with exertion:  [x] No [] Yes    Appetite change or weight change: [x] No [] Yes    Nausea, vomiting or diarrhea:  [x] No [] Yes    Fever, sweats or chills: [x] No [] Yes    Leg swelling: [x] No [] Yes      Home BP: at goal.    Review of Systems   A comprehensive review of systems was obtained and negative, except as noted in the HPI or PMH.    Problem List   Patient Active Problem List   Diagnosis     Esophageal reflux     Allergic rhinitis     Anemia in chronic renal disease     Coronary artery disease involving native artery of transplanted heart without angina pectoris     Dyslipidemia     MGUS (monoclonal gammopathy of unknown significance)     Ascending aortic aneurysm (H)     Leukopenia     Sigmoid diverticulitis     Heart replaced by transplant (H)     Aortic stenosis     Status post coronary angiogram     Immunosuppression (H)     Type 2 diabetes mellitus (H)     Pancreatic cyst     Kidney replaced by transplant     Aftercare following organ transplant     HTN, kidney transplant related      Secondary renal hyperparathyroidism (H)     Vitamin D deficiency     Hypomagnesemia     SHEELA on CPAP       Social History   Social History     Tobacco Use     Smoking status: Former Smoker     Packs/day: 1.00     Years: 20.00     Pack years: 20.00     Types: Cigarettes     Start date: 1984     Last attempt to quit: 1994     Years since quittin.5     Smokeless tobacco: Never Used   Substance Use Topics     Alcohol use: No     Alcohol/week: 0.0 standard drinks     Drug use: No     Allergies   Allergies   Allergen Reactions     Norco [Hydrocodone-Acetaminophen] Nausea and Vomiting     Cats      Throat tightness     Isosorbide Other (See Comments)     hypotension     Penicillins Hives     Seasonal Allergies      rhinitis     Shrimp      Throat closes      Medications   Current Outpatient Medications   Medication Sig     acetaminophen (TYLENOL) 325 MG tablet Take 2 tablets (650 mg) by mouth every 4 hours as needed for other (multimodal surgical pain management along with NSAIDS and opioid medication as indicated based on pain control and physical function.)     aspirin 81 MG EC tablet Take 81 mg by mouth daily     atorvastatin (LIPITOR) 40 MG tablet Take 1 tablet (40 mg) by mouth daily     blood glucose (ACCU-CHEK GUIDE) test strip Use to test blood sugar 2 times daily or as directed.     blood glucose monitoring (ACCU-CHEK FASTCLIX) lancets Use to test blood sugar 2 times daily or as directed.     fluticasone (FLONASE) 50 MCG/ACT nasal spray Spray 1 spray into both nostrils daily (Patient taking differently: Spray 1 spray into both nostrils daily as needed )     HYDROmorphone (DILAUDID) 2 MG tablet Take 1 tablet (2 mg) by mouth every 4 hours as needed for moderate to severe pain     insulin glargine (LANTUS PEN) 100 UNIT/ML pen Inject 10 Units Subcutaneous every morning Or as directed     magnesium oxide (MAG-OX) 400 MG tablet Take 2 tablets (800 mg) by mouth daily (with lunch)     melatonin 1 MG TABS  tablet Take 2 tablets (2 mg) by mouth nightly as needed for sleep     mupirocin (BACTROBAN) 2 % external ointment Apply topically 3 times daily     mycophenolate (GENERIC EQUIVALENT) 250 MG capsule Take 3 capsules (750 mg) by mouth 2 times daily     pantoprazole (PROTONIX) 40 MG EC tablet Take 40 mg by mouth daily     polyethylene glycol (MIRALAX/GLYCOLAX) packet Take 17 g by mouth daily     senna-docusate (SENOKOT-S/PERICOLACE) 8.6-50 MG tablet Take 2 tablets by mouth 2 times daily     sodium bicarbonate 650 MG tablet Take 2 tablets (1,300 mg) by mouth 2 times daily     sulfamethoxazole-trimethoprim (BACTRIM/SEPTRA) 400-80 MG tablet Take 1 tablet by mouth daily     tacrolimus (GENERIC EQUIVALENT) 0.5 MG capsule Take 1 capsule (0.5 mg) by mouth every morning Total dose = 2.5 mg in the AM and 2 mg in the PM     tacrolimus (GENERIC EQUIVALENT) 1 MG capsule Take 2 capsules (2 mg) by mouth 2 times daily Total dose = 2.5. mg in the AM and 2 mg in the PM     valACYclovir (VALTREX) 500 MG tablet Take 3 tablets (1,500 mg) by mouth 2 times daily     Vitamin D3 (CHOLECALCIFEROL) 25 mcg (1000 units) tablet Take 1 tablet (25 mcg) by mouth daily     No current facility-administered medications for this visit.      Facility-Administered Medications Ordered in Other Visits   Medication     diatrizoate meglumine-sodium (GASTROGRAFIN/GASTROVIEW) 66-10 % solution 480 mL     There are no discontinued medications.    Physical Exam   Vital Signs: /82   Pulse 102   Wt 71 kg (156 lb 9.1 oz)   SpO2 100%   BMI 23.81 kg/m      GENERAL APPEARANCE: alert and no distress  HENT: mouth without ulcers or lesions  LYMPHATICS: no cervical or supraclavicular nodes  RESP: lungs clear to auscultation - no rales, rhonchi or wheezes  CV: regular rhythm, normal rate, no rub, no murmur  EDEMA: no LE edema bilaterally  ABDOMEN: soft, nondistended, nontender, bowel sounds normal  MS: extremities normal - no gross deformities noted, no evidence of  inflammation in joints, no muscle tenderness  SKIN: no rash    Data     Renal Latest Ref Rng & Units 2/17/2020 2/10/2020 2/6/2020   Na 133 - 144 mmol/L 136 139 137   K 3.4 - 5.3 mmol/L 4.1 3.9 4.0   Cl 94 - 109 mmol/L 107 109 108   CO2 20 - 32 mmol/L 22 24 23   BUN 7 - 30 mg/dL 22 20 22   Cr 0.66 - 1.25 mg/dL 0.96 0.95 1.12   Glucose 70 - 99 mg/dL 196(H) 214(H) 192(H)   Ca  8.5 - 10.1 mg/dL 8.9 8.9 8.9   Mg 1.6 - 2.3 mg/dL 1.6 1.4(L) 1.6     Bone Health Latest Ref Rng & Units 2/17/2020 2/10/2020 2/6/2020   Phos 2.5 - 4.5 mg/dL 4.2 4.2 4.2   PTHi 18 - 80 pg/mL - - -   Vit D Def 20 - 75 ug/L - - -     Heme Latest Ref Rng & Units 2/17/2020 2/10/2020 2/6/2020   WBC 4.0 - 11.0 10e9/L 3.5(L) 3.3(L) 3.8(L)   Hgb 13.3 - 17.7 g/dL 12.0(L) 11.4(L) 11.4(L)   Plt 150 - 450 10e9/L 160 150 165     Liver Latest Ref Rng & Units 12/24/2019 12/10/2019 10/28/2019   AP 40 - 150 U/L 278(H) 372(H) 318(H)   TBili 0.2 - 1.3 mg/dL 1.1 1.0 0.7   DBili 0.0 - 0.2 mg/dL 0.2 - -   ALT 0 - 70 U/L 18 16 14   AST 0 - 45 U/L 15 11 11   Tot Protein 6.8 - 8.8 g/dL 7.1 7.6 7.6   Albumin 3.4 - 5.0 g/dL 3.8 4.0 3.8     Pancreas Latest Ref Rng & Units 12/24/2019 12/20/2019 12/10/2019   A1C 0 - 5.6 % 4.5 4.3 5.2   Amylase 30 - 110 U/L - - -     Iron studies Latest Ref Rng & Units 12/10/2019 6/10/2019 7/10/2018   Iron 35 - 180 ug/dL 84 118 66   Iron sat 15 - 46 % 35 47(H) 28   Ferritin 26 - 388 ng/mL 445(H) 644(H) 771(H)     [facility] Txp Virology Latest Ref Rng & Units 2/10/2020 2/3/2020 1/30/2020   CVM DNA Quant - - - Plasma   BK Spec - Plasma Plasma, EDTA anticoagulant -   BK Res BKNEG:BK Virus DNA Not Detected copies/mL BK Virus DNA Not Detected BK Virus DNA Not Detected -   BK Log <2.7 Log copies/mL Not Calculated Not Calculated -   Hep B Core NR:Nonreactive - - -        Recent Labs   Lab Test 02/10/20  0955 02/13/20  0919 02/17/20  0937   DOSTAC 2/9/20 2120 2,120 02/16/2020 09:20PM    TACROL 10.8 11.2 8.8     Recent Labs   Lab Test 12/26/19  0720  01/27/20  0918 02/03/20  0948   DOSMPA Not Provided [facility] 02/02/20 0915pm   MPACID 1.65 2.39 2.41   MPAG 58.9 54.6 50.4

## 2020-02-19 NOTE — TELEPHONE ENCOUNTER
"Requested Prescriptions   Pending Prescriptions Disp Refills     DAILY-DONY PO TABS [Pharmacy Med Name: DAILY-DONY TABLETS]  This maybe a DUPLICATE.   Last Written Prescription Date:  2-18-20  Last Fill Quantity: 90 tab,  # refills: 0   Last office visit: 11/5/2019 with prescribing provider:  Yahir Turcios    Future Office Visit:   Next 5 appointments (look out 90 days)    Feb 19, 2020 11:00 AM CST  (Arrive by 10:45 AM)  [facility] [address] [place] [id number]  [phone]   Apr 21, 2020 11:00 AM CDT  (Arrive by 10:45 AM)  [facility]) [address] [place] [phone]  [phone]       90 tablet      Sig: TAKE 1 TABLET BY MOUTH DAILY       Vitamin Supplements (Adult) Protocol Failed - 2/18/2020 12:01 PM        Failed - Medication is active on med list        Passed - High dose Vitamin D not ordered        Passed - Recent (12 mo) or future (30 days) visit within the authorizing provider's specialty     Patient has had an office visit with the authorizing provider or a provider within the authorizing providers department within the previous 12 mos or has a future within next 30 days. See \"Patient Info\" tab in inbasket, or \"Choose Columns\" in Meds & Orders section of the refill encounter.               "

## 2020-02-19 NOTE — PROGRESS NOTES
SUBJECTIVE/OBJECTIVE:                Murray Nicholson is a 64 year old male coming-in for a Follow-up MTM visit.  He was referred post txp.     Chief Complaint: 2 month post txp.      Allergies/ADRs: Reviewed in Epic  Tobacco:  reports that he quit smoking about 25 years ago. His smoking use included cigarettes. He started smoking about 36 years ago. He has a 20.00 pack-year smoking history. He has never used smokeless tobacco.  Alcohol: not currently using  Caffeine: 0-1 cups/day of coffee, 1 cups/day of tea  PMH: Reviewed in Epic    Medication Adherence/Access:  Patient uses pill box(es).  Patient takes medications 4 time(s) per day. 9am and 9pm  Per patient, misses medication 0 times per week.   Medication barriers: none. Insulin is enpensive.   The patient fills medications at [place]: NO, fills medications at [facility].    Renal/ post heart Transplant:  Current immunosuppressants include Tacrolimus 2.5 mg qAM and 2 mg qPM (0-6 months post tx, goal 8-10) and MMF 750mg BID (goal 0-6 month post tx: 1-3.5).  Pt reports no side effects  Transplant date: 12/19/19 renal, heart txp in 6/14/2018  Estimated Creatinine Clearance: 76.3 mL/min (based on SCr of 0.96 mg/dL).  CMV prophylaxis: Valacyclovir 1500mg BID. For 3 months post transplant (end date 3/19/20).   PCP prophylaxis: Bactrim S S daily  PPI use: Pantoprazole 40mg daily- GP took him down to one daily, denies heartburn.   Current supplements for electrolyte replacement: Mag Oxide 800mg daily ( 2 hours from MMF), sodium bicarbonate 1300 mg BID.  Magnesium   Date Value Ref Range Status   02/17/2020 1.6 1.6 - 2.3 mg/dL Final      Ref. Range 2/10/2020 09:55 2/17/2020 09:37   Carbon Dioxide Latest Ref Range: 20 - 32 mmol/L 24 22   Tx Coordinator: Jennifer Villarreal Tx MD: Dr. Quijano, Using Med Card: Yes  Immunizations: annual flu shot 2019; [id number]:  2005, 2011; Prevnar 13: 2015; TDaP:  2013; Shingrix: Zostavax 2015    Diabetes:  Pt currently taking Lantus  10 units daily.  Pt is not experiencing side effects.  SMBG: one to two times daily.   Ranges (patient reported):   Date FBG/ 2hours post   2/19 115   2/18 132   2/17 133   2/16 136   2/15 134   2/14 132   2/13 140     Date FBG/ 2hours post Dinner /2hours post 10 PM   1/10/20 156       1/9 148       1/8 144       1/7 149       1/6 157       1/5 156       1/4 142 136 191    Patient is not experiencing hypoglycemia  Recent symptoms of high blood sugar? None  Lab Results   Component Value Date    A1C 4.5 12/24/2019    A1C 4.3 12/20/2019    A1C 5.2 12/10/2019    A1C 5.6 09/23/2019    A1C 5.2 06/10/2019     Hyperlipidemia/CVD: Current therapy includes atorvastatin 40mg once daily.  Pt reports no significant myalgias or other side effects.  Additionally, patient is on aspirin 81 mg daily. Patient reports denies signs of bleeding/bruising.     GERD: Current medications include: Protonix (pantoprazole) 40 mg once daily. Pt c/o no current symptoms.  Patient feels that current regimen is effective.    Back Pain: Current medications include acetaminophen 1000 mg 1-2x weekly at bedtime for back pain. Baseline pain is 4-5/10.     Today's Vitals: There were no vitals taken for this visit.    ASSESSMENT:                 Medication Adherence: good, no issues today.    Renal/ post heart Transplant:  Needs improvement. Reminded patient about discontinuing his Valtrex on 3/19/20, given that this would be three months post-transplant for CMV prophylaxis.     Diabetes: Stable. Patient's FBG are at goal of , given his history of heart transplant. Recommend continuing current regimen of Lantus, given patient is not interested in transitioning to oral medications at this time.     Hyperlipidemia/CVD: Stable.     GERD: Needs improvement. Patient may benefit from discontinuing pantoprazole, given that patient is not experiencing any signs/symptoms of GERD and long-term use of PPIs may lead to adverse effects.     Back Pain: Stable.  Patient only needs to use acetaminophen once or twice a week to minimize back pain at bedtime.     PLAN:                Pt to...  1. Hold pantoprazole. If you have rebound heartburn, you can try taking pantoprazole every other day. Let me know if you have heartburn issues off of this medication.   2. Stop Valtrex on 3/19/20.     I spent 30 minutes with this patient today. I offer these suggestions for consideration by txp team. A copy of the visit note was provided to the patient's referring provider.    Will follow up in 2 months.    The patient was given a summary of these recommendations as an after visit summary.    Carlos CorralD IV Student    Eduardo Lea, PharmD  [facility] Pharmacist    Phone: [phone]

## 2020-02-19 NOTE — LETTER
2/19/2020      RE: Murray Nicholson  [address]  [place] [address]       ACUTE TRANSPLANT NEPHROLOGY VISIT    Assessment & Plan   # LDKT: Stable   - Baseline Cr ~ 0.9 mg / dl   - Proteinuria: Minimal (0.2-0.5 grams)   - Date DSA Last Checked: none on 1/27/20   - BK Viremia: none on 2/10.   - Kidney Tx Biopsy: No    # Immunosuppression: Tacrolimus immediate release (goal 8-10) and Mycophenolate mofetil (goal 1.0-3.5)   - Changes: No    # Infection Prophylaxis:   - PJP: Sulfa/TMP (Bactrim)  - CMV: Valtrex    # Hypertension: Controlled;  Goal BP: < 130/80   - Volume status: Euvolemic     - Changes: No     #DMII: per primary.     # Anemia in Chronic Renal Disease: Hgb: Stable      ANASTASIYA: Yes   - Iron studies: Not checked recently    # Mineral Bone Disorder:   - Secondary renal hyperparathyroidism; PTH level: Mildly elevated (151-300 pg/ml)        - Vitamin D; level: Normal        On Supplement: Yes  - Calcium; level: Normal         - Phosphorus; level: Normal        On Supplement: No    # Electrolytes:   - Potassium; level: Normal          - Magnesium; level: Normal        On Supplement: Yes  - Bicarbonate; level: Normal        On Supplement: Yes  - Sodium; level: Normal            # Medical Compliance: Yes    # Transplant History:  Etiology of Kidney Failure: Diabetes mellitus type 2  Tx: LDKT  Transplant: 12/19/2019 (Kidney), 6/14/2018 (Heart)  Donor Type: Living Donor Class:   Crossmatch at time of Tx: negative  DSA at time of Tx: No  Significant changes in immunosuppression: None  CMV IgG Ab High Risk Discordance (D+/R-): No  EBV IgG Ab High Risk Discordance (D+/R-): No  Significant transplant-related complications: None    Transplant Office Phone Number: [phone]    Assessment and plan was discussed with the patient and he voiced his understanding and agreement.    Return visit: 2 months.   Bobby Escobar MD    Chief Complaint   Mr. Nicholson is a 65 year old here for routine follow up and kidney  transplant.     History of Present Illness      The patient is a 65-year-old male with history of end-stage kidney disease due to type 2 diabetes who is status post living kidney transplant on 12/19/2019 after a heart transplant from 2018 here for follow-up of his kidney transplant.    Overall the patient feels well.  He denies any chest pain or breathing difficulties.  He has no nausea or vomiting.  He has no fever shakes or chills.  He is moving his bowels appropriately.    The patient's blood pressure is reasonably well-controlled at 133/82 today.  Long-term I told the patient that his blood pressure goal would likely be less than 130/80.    From a kidney transplant perspective, the patient's allograft is functioning well with a baseline creatinine of 0.9 mg/dL.  The patient continues on immunosuppression with tacrolimus and CellCept.  His tacrolimus goal is 8 to 10 ng/mL.    Recent Hospitalizations:  [x] No [] Yes    New Medical Issues: [x] No [] Yes    Decreased energy: [x] No [] Yes    Chest pain or SOB with exertion:  [x] No [] Yes    Appetite change or weight change: [x] No [] Yes    Nausea, vomiting or diarrhea:  [x] No [] Yes    Fever, sweats or chills: [x] No [] Yes    Leg swelling: [x] No [] Yes      Home BP: at goal.    Review of Systems   A comprehensive review of systems was obtained and negative, except as noted in the HPI or PMH.    Problem List   Patient Active Problem List   Diagnosis     Esophageal reflux     Allergic rhinitis     Anemia in chronic renal disease     Coronary artery disease involving native artery of transplanted heart without angina pectoris     Dyslipidemia     MGUS (monoclonal gammopathy of unknown significance)     Ascending aortic aneurysm (H)     Leukopenia     Sigmoid diverticulitis     Heart replaced by transplant (H)     Aortic stenosis     Status post coronary angiogram     Immunosuppression (H)     Type 2 diabetes mellitus (H)     Pancreatic cyst     Kidney replaced by  transplant     Aftercare following organ transplant     HTN, kidney transplant related     Secondary renal hyperparathyroidism (H)     Vitamin D deficiency     Hypomagnesemia     SHEELA on CPAP       Social History   Social History     Tobacco Use     Smoking status: Former Smoker     Packs/day: 1.00     Years: 20.00     Pack years: 20.00     Types: Cigarettes     Start date: 1984     Last attempt to quit: 1994     Years since quittin.5     Smokeless tobacco: Never Used   Substance Use Topics     Alcohol use: No     Alcohol/week: 0.0 standard drinks     Drug use: No     Allergies   Allergies   Allergen Reactions     Norco [Hydrocodone-Acetaminophen] Nausea and Vomiting     Cats      Throat tightness     Isosorbide Other (See Comments)     hypotension     Penicillins Hives     Seasonal Allergies      rhinitis     Shrimp      Throat closes      Medications   Current Outpatient Medications   Medication Sig     acetaminophen (TYLENOL) 325 MG tablet Take 2 tablets (650 mg) by mouth every 4 hours as needed for other (multimodal surgical pain management along with NSAIDS and opioid medication as indicated based on pain control and physical function.)     aspirin 81 MG EC tablet Take 81 mg by mouth daily     atorvastatin (LIPITOR) 40 MG tablet Take 1 tablet (40 mg) by mouth daily     blood glucose (ACCU-CHEK GUIDE) test strip Use to test blood sugar 2 times daily or as directed.     blood glucose monitoring (ACCU-CHEK FASTCLIX) lancets Use to test blood sugar 2 times daily or as directed.     fluticasone (FLONASE) 50 MCG/ACT nasal spray Spray 1 spray into both nostrils daily (Patient taking differently: Spray 1 spray into both nostrils daily as needed )     HYDROmorphone (DILAUDID) 2 MG tablet Take 1 tablet (2 mg) by mouth every 4 hours as needed for moderate to severe pain     insulin glargine (LANTUS PEN) 100 UNIT/ML pen Inject 10 Units Subcutaneous every morning Or as directed     magnesium oxide (MAG-OX) 400  MG tablet Take 2 tablets (800 mg) by mouth daily (with lunch)     melatonin 1 MG TABS tablet Take 2 tablets (2 mg) by mouth nightly as needed for sleep     mupirocin (BACTROBAN) 2 % external ointment Apply topically 3 times daily     mycophenolate (GENERIC EQUIVALENT) 250 MG capsule Take 3 capsules (750 mg) by mouth 2 times daily     pantoprazole (PROTONIX) 40 MG EC tablet Take 40 mg by mouth daily     polyethylene glycol (MIRALAX/GLYCOLAX) packet Take 17 g by mouth daily     senna-docusate (SENOKOT-S/PERICOLACE) 8.6-50 MG tablet Take 2 tablets by mouth 2 times daily     sodium bicarbonate 650 MG tablet Take 2 tablets (1,300 mg) by mouth 2 times daily     sulfamethoxazole-trimethoprim (BACTRIM/SEPTRA) 400-80 MG tablet Take 1 tablet by mouth daily     tacrolimus (GENERIC EQUIVALENT) 0.5 MG capsule Take 1 capsule (0.5 mg) by mouth every morning Total dose = 2.5 mg in the AM and 2 mg in the PM     tacrolimus (GENERIC EQUIVALENT) 1 MG capsule Take 2 capsules (2 mg) by mouth 2 times daily Total dose = 2.5. mg in the AM and 2 mg in the PM     valACYclovir (VALTREX) 500 MG tablet Take 3 tablets (1,500 mg) by mouth 2 times daily     Vitamin D3 (CHOLECALCIFEROL) 25 mcg (1000 units) tablet Take 1 tablet (25 mcg) by mouth daily     No current facility-administered medications for this visit.      Facility-Administered Medications Ordered in Other Visits   Medication     diatrizoate meglumine-sodium (GASTROGRAFIN/GASTROVIEW) 66-10 % solution 480 mL     There are no discontinued medications.    Physical Exam   Vital Signs: /82   Pulse 102   Wt 71 kg (156 lb 9.1 oz)   SpO2 100%   BMI 23.81 kg/m       GENERAL APPEARANCE: alert and no distress  HENT: mouth without ulcers or lesions  LYMPHATICS: no cervical or supraclavicular nodes  RESP: lungs clear to auscultation - no rales, rhonchi or wheezes  CV: regular rhythm, normal rate, no rub, no murmur  EDEMA: no LE edema bilaterally  ABDOMEN: soft, nondistended, nontender,  bowel sounds normal  MS: extremities normal - no gross deformities noted, no evidence of inflammation in joints, no muscle tenderness  SKIN: no rash    Data     Renal Latest Ref Rng & Units 2/17/2020 2/10/2020 2/6/2020   Na 133 - 144 mmol/L 136 139 137   K 3.4 - 5.3 mmol/L 4.1 3.9 4.0   Cl 94 - 109 mmol/L 107 109 108   CO2 20 - 32 mmol/L 22 24 23   BUN 7 - 30 mg/dL 22 20 22   Cr 0.66 - 1.25 mg/dL 0.96 0.95 1.12   Glucose 70 - 99 mg/dL 196(H) 214(H) 192(H)   Ca  8.5 - 10.1 mg/dL 8.9 8.9 8.9   Mg 1.6 - 2.3 mg/dL 1.6 1.4(L) 1.6     Bone Health Latest Ref Rng & Units 2/17/2020 2/10/2020 2/6/2020   Phos 2.5 - 4.5 mg/dL 4.2 4.2 4.2   PTHi 18 - 80 pg/mL - - -   Vit D Def 20 - 75 ug/L - - -     Heme Latest Ref Rng & Units 2/17/2020 2/10/2020 2/6/2020   WBC 4.0 - 11.0 10e9/L 3.5(L) 3.3(L) 3.8(L)   Hgb 13.3 - 17.7 g/dL 12.0(L) 11.4(L) 11.4(L)   Plt 150 - 450 10e9/L 160 150 165     Liver Latest Ref Rng & Units 12/24/2019 12/10/2019 10/28/2019   AP 40 - 150 U/L 278(H) 372(H) 318(H)   TBili 0.2 - 1.3 mg/dL 1.1 1.0 0.7   DBili 0.0 - 0.2 mg/dL 0.2 - -   ALT 0 - 70 U/L 18 16 14   AST 0 - 45 U/L 15 11 11   Tot Protein 6.8 - 8.8 g/dL 7.1 7.6 7.6   Albumin 3.4 - 5.0 g/dL 3.8 4.0 3.8     Pancreas Latest Ref Rng & Units 12/24/2019 12/20/2019 12/10/2019   A1C 0 - 5.6 % 4.5 4.3 5.2   Amylase 30 - 110 U/L - - -     Iron studies Latest Ref Rng & Units 12/10/2019 6/10/2019 7/10/2018   Iron 35 - 180 ug/dL 84 118 66   Iron sat 15 - 46 % 35 47(H) 28   Ferritin 26 - 388 ng/mL 445(H) 644(H) 771(H)     [facility] Txp Virology Latest Ref Rng & Units 2/10/2020 2/3/2020 1/30/2020   CVM DNA Quant - - - Plasma   BK Spec - Plasma Plasma, EDTA anticoagulant -   BK Res BKNEG:BK Virus DNA Not Detected copies/mL BK Virus DNA Not Detected BK Virus DNA Not Detected -   BK Log <2.7 Log copies/mL Not Calculated Not Calculated -   Hep B Core NR:Nonreactive - - -        Recent Labs   Lab Test 02/10/20  0955 02/13/20  0919 02/17/20  0937   DOSTAC 2/9/20 2120 2,120  02/16/2020 09:20PM    TACROL 10.8 11.2 8.8     Recent Labs   Lab Test 12/26/19  0720 01/27/20  0918 02/03/20  0948   DOSMPA Not Provided NTP 02/02/20 0915pm   MPACID 1.65 2.39 2.41   MPAG 58.9 54.6 50.4       Early Post Transplant

## 2020-02-19 NOTE — PATIENT INSTRUCTIONS
Recommendations from today's MTM visit:                                                      1. Hold your pantoprazole, if you get rebound heartburn try taking it every other day. Let me know if you have heartburn issues off of this medication.     It was great to speak with you today.  I value your experience and would be very thankful for your time with providing feedback on our clinic survey. You may receive a survey via email or text message in the next few days.     Next MTM visit: 2 months from now    To schedule another MTM appointment, please call the clinic directly or you may call the MTM scheduling line at [phone] or toll-free at [phone].     My Clinical Pharmacist's contact information:                                                      It was a pleasure talking with you today!  Please feel free to contact me with any questions or concerns you have.      Eduardo Lea, PharmD  [facility] Pharmacist    Phone: [phone]

## 2020-02-19 NOTE — TELEPHONE ENCOUNTER
Post Kidney and Pancreas Transplant Team Conference  Date: 2/19/2020  Transplant Coordinator: Lillie Ulloa, Cami Villarreal     Attendees:  [x]  Dr. Quijano  [x] Patt Johns LPN     [x]  Dr. Ulloa [x] Eidth Ward, TONY [] Donna Wheat LPN   []  Dr. Escobar [] Marielena Romero, RN     [] Radha Gray RN [x] Eduardo Lea, PharmD   [] Dr. Ayala [x] Jennifer Villarreal, TONY    [] Dr. Cheney [] Fantasma Dorsey RN    [x] Dr. Chase [] Autumn Mendez RN    [] Dr. Cowan [] Kari Carr, TONY     [] Anastasia Tanner, TONY    [] Surgery Fellow [x] Alma Rosa Monique RN    [] Maliha Jesus NP [] Scarlet Haines RN        Verbal Plan Read Back:   No changes at this time    Routed to RN Coordinator   Patt Johns LPN

## 2020-02-19 NOTE — NURSING NOTE
Chief Complaint   Patient presents with     RECHECK     2 months     Blood pressure 133/82, pulse 102, weight 71 kg (156 lb 9.1 oz), SpO2 100 %.    Cecilia Kent/REA  February 19, 2020 10:24 AM

## 2020-02-20 DIAGNOSIS — Z79.899 LONG TERM USE OF DRUG: ICD-10-CM

## 2020-02-20 DIAGNOSIS — Z94.0 KIDNEY REPLACED BY TRANSPLANT: ICD-10-CM

## 2020-02-20 LAB
ANION GAP SERPL CALCULATED.3IONS-SCNC: 7 MMOL/L (ref 3–14)
BUN SERPL-MCNC: 18 MG/DL (ref 7–30)
CALCIUM SERPL-MCNC: 9 MG/DL (ref 8.5–10.1)
CHLORIDE SERPL-SCNC: 108 MMOL/L (ref 94–109)
CO2 SERPL-SCNC: 23 MMOL/L (ref 20–32)
CREAT SERPL-MCNC: 0.97 MG/DL (ref 0.66–1.25)
ERYTHROCYTE [DISTWIDTH] IN BLOOD BY AUTOMATED COUNT: 17.2 % (ref 10–15)
GFR SERPL CREATININE-BSD FRML MDRD: 81 ML/MIN/{1.73_M2}
GLUCOSE SERPL-MCNC: 180 MG/DL (ref 70–99)
HCT VFR BLD AUTO: 36.6 % (ref 40–53)
HGB BLD-MCNC: 12.3 G/DL (ref 13.3–17.7)
MAGNESIUM SERPL-MCNC: 1.6 MG/DL (ref 1.6–2.3)
MCH RBC QN AUTO: 34.6 PG (ref 26.5–33)
MCHC RBC AUTO-ENTMCNC: 33.6 G/DL (ref 31.5–36.5)
MCV RBC AUTO: 103 FL (ref 78–100)
PHOSPHATE SERPL-MCNC: 3.9 MG/DL (ref 2.5–4.5)
PLATELET # BLD AUTO: 179 10E9/L (ref 150–450)
POTASSIUM SERPL-SCNC: 4 MMOL/L (ref 3.4–5.3)
RBC # BLD AUTO: 3.56 10E12/L (ref 4.4–5.9)
SODIUM SERPL-SCNC: 138 MMOL/L (ref 133–144)
TACROLIMUS BLD-MCNC: 7.3 UG/L (ref 5–15)
TME LAST DOSE: NORMAL H
WBC # BLD AUTO: 3.6 10E9/L (ref 4–11)

## 2020-02-20 PROCEDURE — 87799 DETECT AGENT NOS DNA QUANT: CPT | Performed by: INTERNAL MEDICINE

## 2020-02-20 PROCEDURE — 80197 ASSAY OF TACROLIMUS: CPT | Performed by: INTERNAL MEDICINE

## 2020-02-21 ENCOUNTER — TELEPHONE (OUTPATIENT)
Dept: TRANSPLANT | Facility: CLINIC | Age: 65
End: 2020-02-21

## 2020-02-21 DIAGNOSIS — Z94.0 KIDNEY REPLACED BY TRANSPLANT: ICD-10-CM

## 2020-02-21 DIAGNOSIS — E87.20 METABOLIC ACIDOSIS: ICD-10-CM

## 2020-02-21 RX ORDER — TACROLIMUS 0.5 MG/1
0.5 CAPSULE ORAL 2 TIMES DAILY
Qty: 30 CAPSULE | Refills: 11 | Status: SHIPPED | OUTPATIENT
Start: 2020-02-21 | End: 2020-03-10

## 2020-02-21 RX ORDER — TACROLIMUS 1 MG/1
2 CAPSULE ORAL 2 TIMES DAILY
Qty: 120 CAPSULE | Refills: 11 | Status: SHIPPED | OUTPATIENT
Start: 2020-02-21 | End: 2020-03-10

## 2020-02-21 NOTE — TELEPHONE ENCOUNTER
ISSUE:  tacro below goal on 2.5mg AM, 2mg PM after increasing 1 week ago.    PLAN:  Ensure this is taken consistently with or without food, not alternating, to ensure absorption is more consistent.    Change tacro dose as necessary.     OUTCOME:  Murray does report that he eats consistently before he takes his meds.    He denies any constipation.        ISSUE:   Tacrolimus IR level 7.3 on 2/20, goal 8-10, dose 2.5mg AM, 2 mg PM.    PLAN:   Please call patient and confirm this was an accurate 12-hour trough. Verify Tacrolimus IR dose as above. Confirm no new medications or illness. Confirm no missed doses. If accurate trough and accurate dose, increase Tacrolimus IR dose to 2.5 mg BID and repeat labs as scheduled.    Okay for labs weekly (starting Monday, 2/24).    OUTCOME:   Spoke with patient, they confirm accurate trough level and current dose 2.5mg AM, 2 mg PM. Patient confirmed dose change to 2.5 mg BID and to repeat labs as scheduled.Evelio sent to preferred pharmacy for dose change. Patient voiced understanding of plan.

## 2020-02-22 NOTE — TELEPHONE ENCOUNTER
Routing refill request to provider for review/approval because:  Drug not active on patient's medication list  I am not seeing Multivitamin or Daily vit on patient's current medication list.  Ashlie Lopez RN

## 2020-02-24 ENCOUNTER — OFFICE VISIT (OUTPATIENT)
Dept: PODIATRY | Facility: CLINIC | Age: 65
End: 2020-02-24
Payer: MEDICARE

## 2020-02-24 VITALS
HEIGHT: 69 IN | WEIGHT: 158.95 LBS | DIASTOLIC BLOOD PRESSURE: 98 MMHG | BODY MASS INDEX: 23.54 KG/M2 | HEART RATE: 77 BPM | SYSTOLIC BLOOD PRESSURE: 148 MMHG

## 2020-02-24 DIAGNOSIS — E11.29 TYPE 2 DIABETES MELLITUS WITH OTHER DIABETIC KIDNEY COMPLICATION, WITHOUT LONG-TERM CURRENT USE OF INSULIN (H): ICD-10-CM

## 2020-02-24 DIAGNOSIS — L60.3 ONYCHODYSTROPHY: Primary | ICD-10-CM

## 2020-02-24 DIAGNOSIS — R23.4 ESCHAR OF TOE: ICD-10-CM

## 2020-02-24 DIAGNOSIS — Z94.0 KIDNEY REPLACED BY TRANSPLANT: ICD-10-CM

## 2020-02-24 DIAGNOSIS — Z79.899 LONG TERM USE OF DRUG: ICD-10-CM

## 2020-02-24 DIAGNOSIS — L60.0 ONYCHOCRYPTOSIS: ICD-10-CM

## 2020-02-24 LAB
ANION GAP SERPL CALCULATED.3IONS-SCNC: 6 MMOL/L (ref 3–14)
BUN SERPL-MCNC: 19 MG/DL (ref 7–30)
CALCIUM SERPL-MCNC: 8.7 MG/DL (ref 8.5–10.1)
CHLORIDE SERPL-SCNC: 108 MMOL/L (ref 94–109)
CO2 SERPL-SCNC: 23 MMOL/L (ref 20–32)
CREAT SERPL-MCNC: 1.02 MG/DL (ref 0.66–1.25)
ERYTHROCYTE [DISTWIDTH] IN BLOOD BY AUTOMATED COUNT: 17.3 % (ref 10–15)
GFR SERPL CREATININE-BSD FRML MDRD: 77 ML/MIN/{1.73_M2}
GLUCOSE SERPL-MCNC: 170 MG/DL (ref 70–99)
HCT VFR BLD AUTO: 35.4 % (ref 40–53)
HGB BLD-MCNC: 11.9 G/DL (ref 13.3–17.7)
MCH RBC QN AUTO: 34.6 PG (ref 26.5–33)
MCHC RBC AUTO-ENTMCNC: 33.6 G/DL (ref 31.5–36.5)
MCV RBC AUTO: 103 FL (ref 78–100)
PLATELET # BLD AUTO: 157 10E9/L (ref 150–450)
POTASSIUM SERPL-SCNC: 4 MMOL/L (ref 3.4–5.3)
RBC # BLD AUTO: 3.44 10E12/L (ref 4.4–5.9)
SODIUM SERPL-SCNC: 137 MMOL/L (ref 133–144)
TACROLIMUS BLD-MCNC: 9.4 UG/L (ref 5–15)
TME LAST DOSE: NORMAL H
WBC # BLD AUTO: 3 10E9/L (ref 4–11)

## 2020-02-24 PROCEDURE — 99204 OFFICE O/P NEW MOD 45 MIN: CPT | Performed by: PODIATRIST

## 2020-02-24 PROCEDURE — 80197 ASSAY OF TACROLIMUS: CPT | Performed by: INTERNAL MEDICINE

## 2020-02-24 RX ORDER — MULTIVITAMIN WITH FOLIC ACID 400 MCG
TABLET ORAL
Qty: 90 TABLET | Refills: 3 | Status: SHIPPED | OUTPATIENT
Start: 2020-02-24 | End: 2020-12-09

## 2020-02-24 ASSESSMENT — MIFFLIN-ST. JEOR: SCORE: 1492.41
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
## 2020-02-24 NOTE — PATIENT INSTRUCTIONS
"Thank you for choosing [facility]!    Follow up as needed    DR. DIAZ'S CLINIC LOCATIONS     MONDAY  [place] TUESDAY & FRIDAY AM  EUN   [address]   [address]   [place] [address] JANNET Brown [address]   [phone]  [phone]  [phone]       WEDNESDAY  [facility]: [phone]   [address] [place] [address] APPOINTMENTS: [phone]   [place] [address] BILLING QUESTIONS: [phone]   [phone]       DIABETES AND YOUR FEET  Diabetes can result in several problems in the feet including ulcers (open sores) and amputations. Two of the most important reasons why people develop foot problems when they have diabetes is : 1. Neuropathy (loss of feeling)  2. Vascular disease (loss or decrease of blood flow).    Neuropathy is a term used to describe a loss of nerve function.  Patients with diabetes are at risk of developing neuropathy if their sugars continue to run high and are above the normal value. One theory for neuropathy is that the \"extra\" sugar in the body enters the nerves and is broken down. These by-products build up in the nerve causing it to swell and impairing nerve function. Often times, this can be prevented by controlling your sugars, dieting and exercise.    When a person develops neuropathy, they usually begin to feel numbness or tingling in their feet and sometime in their legs.  Other symptoms may include painful burning or hot feet, tingling or feeling like insects or ants are crawling on your feet or legs.  If the diabetes is sever and the sugars run high for long periods of time, neuropathy can also occur in the hands.    Vascular disease  is a term used to describe a loss or decrease in circulation (blood flow). There is a problem in getting blood and oxygen to areas that need it. Similar to neuropathy, sugars can build up in the walls of the arteries (blood vessels) " and cause them to become swollen, thickened and hardened. This decreases the amount of blood that can go to an area that needs it. Though this is common in the legs of diabetic patients, it can also affect other arteries (blood vessels) in the body such as in the heart and eyes.    In the legs, vascular disease usually results in cramping. Patients who develop leg cramps after walking the same distance every time (i.e. One block, half a mile, ect.) need to let their doctors know so that their circulation may be checked. Cramps causing severe pain in the feet and/or legs while sleeping and the cramps go away when you stand or hang your legs off the side of the bed, may also be a sign of poor blood circulation.  Occasional cramping in cold weather or on rare occasions with activity may not be due to poor circulation, but you should inform your doctor.    PREVENTION OF THESE DISEASES  The key to prevention is good blood sugar control. Poor blood sugar control is a big reason many of these problems start. Physical activity (exercise) is a very good way to help decrease your blood sugars. Exercise can lower your blood sugar, blood pressure, and cholesterol. It also reduces your risk for heart disease and stroke, relieves stress, and strengthens your heart, muscles and bones.  In addition, regular activity helps insulin work better, improves your blood circulation, and keeps your joints flexible. If you're trying to lose weight, a combination of exercise and wise food choices can help you reach your target weight and maintain it.      PAIN MANAGEMENT  1.Blood Sugar Control - Most important  2. Medications such as:  Amytriptylline, duloxetine, gabapentin, lyrica, tramadol  3. Nutritional therapy:  Vitamin B6 (100mg daily), Vitamin B12 (75mcg daily), Vitamin D 2000 IU daily), Alpha-Lipoic Acid (600-1800mg daily), Acetyl-L-Carnitine (500-1000mg TID, L-methyl folate (1500mcg daily)    ** Metformin can block Vitamin B6 and B12  so it is important to supplement**    FOOT CARE RECOMMENDATIONS   1. Wash your feet with lukewarm water and a mild soap and then dry them thoroughly, especially between the toes.     2. Examine your feet daily looking for cuts, corns, blisters, cracks, ect, especially after wearing new shoes. Make sure to look between your toes. If you cannot see the bottom of your feet, set a mirror on the floor and hold your foot over it, or ask a spouse, friend or family member to examine your feet for you. Contact your doctor immediately if new problems are noted or if sores are not healing.     3. Immediately apply moisturizer to the tops and bottoms of your feet, avoiding areas between the toes. Hand lotion (Intesive Care, Daniela, Eucerin, Neutrogena, Curel, ect) is sufficient unless your doctor prescribes a medicated lotion. Apply sunscreen to your feet when going swimming outside.     4. Use clean comfortable shoes, wear white socks (if you have any bleeding or drainage, you will see it on white socks). Socks should not have thick seams or cut off the circulation around the leg. Break in new shoes slowly and rotate with older shoes until broken in. Check the inside of your shoes with your hand to look for areas of irritation or objects that may have fallen into your shoes.       5. Keep slippers by the side of your bed for use during the night.     6.  Shoes should be fitted by a professional and should not cause areas of irritation.  Check your feet regularly when wearing a new pair of shoes and replace them as needed.     7.  Talk to your doctor about proper exercise. Exercise and stretching stimulate blood flow to your feet and maintain proper glucose levels.     8.  Monitor your blood glucose level as instructed by your doctor. Notify your doctor immediately if your blood sugar is abnormally high or low.    9. Cut your nails straight across, but then gently round any sharp edges with a cardboard nail file. If you have  neuropathy, peripheral vascular disease or cannot see that well to trim your own toenails contact [facility] ([phone]) or [facility] ([phone]).      THINGS TO AVOID DOING   1.  Do not soak your feet if you have an open sore. Use only lukewarm water and always check the temperature with your hand as hot water can easily burn your feet.       2.  Never use a hot water bottle or heating pad on your feet. Also do not apply cold compresses to your feet. With decreased sensation, you could burn or freeze your feet.       3.  Do not apply any of these to your feet:    -  Over the counter medicine for corns or warts    -  Harsh chemicals like boric acid    -  Do not self-treat corns, cuts, blisters or infections. Always consult your doctor.       4.  Do not wear sandals, slippers or walk barefoot, especially on hot sand or concrete or other harsh surfaces.     5.  If you smoke, stop!!!    ROUTINE FOOT CARE (NAIL TRIMMING / CALLUSES)    Go to afcna.org ([employer]) and search for providers near you.  Otherwise, this is a list we have complied of  recommended locations/providers in [place].    [facility]   [phone]   [facility]  [phone]  [email or web address]   [facility]  [phone]  [place] [facility]  [phone]  [facility].   Kari Rich, DPM  [address] [place] [address] [phone] Alex Leblanc, DPM  [address] [place] [address] [phone]   [facility]  [phone] [address] [place] [address]  [facility]  Dr. John Peacock  [phone]  [place], [place]   [facility]  [phone]  [place] [facility] [place] Locations  (does not take BCBS) FYI:  *Some providers accept insurance while others are out of pocket. Please contact them for details*     NAIL FUNGUS / ONYCHOMYCOSIS   Nail fungus is not a hygiene problem and will not likely lead to significant medical   problems.  The nails may get thick causing pain and possibly local skin infection.   Treatments include debridement (trimming), oral antifungals, topical antifungals and complete removal of the nail. Most fungal nails are not treated.   Topicals such as tea tree oil can be helpful for surface fungus and may, at best, limit   progression. Over the counter creams (such as Lamisil) can also be used however, their effectiveness is also quite low.  Topical treatment with Pen lac is expensive and often not covered by insurance. Pen lac has an approximate 8% success rate. Topical therapy recommendations is to apply twice a day for at least 3-4 months as it takes 9 months for new nail to grow out.    Experts suggest soaking your feet for 15 to 20 minutes in a mixture of 1 cup vinegar to 4 cups warm water. Be sure to rinse well and pat your feet dry when you're done. You can soak your feet like this daily. But if your skin becomes irritated, try soaking only two to three times a week. Vicks VapoRub, as with vinegar, there have been no controlled clinical trials to assess the effectiveness of Vicks VapoRub on nail fungus, but there have been numerous anecdotal reports that it works. There's no consensus on how often to apply this product, so check with your doctor before using it on your nails.     Oral therapies include Sporanox and Lamisil. Oral therapies are also expensive and not very effective. Side effects such as liver disease are the main concern. Return of fungus is common even if the treatment worked.     Other Tips:  - Penlac nail medication apply daily x 4 months; remove old polish first day of each week  - Antifungal cream/powder (Zeasorb) - apply daily to feet and shoes x 2 months  - Clean shoes with Lysol or in washing machine every few weeks  - Rotate shoe gear; give them 24 hours to dry out between days wearing them  - Clean pair of socks in morning, clean pair in afternoon if your feet sweat  - Shower shoes used in  public showers/pools    INGROWN TOENAIL  When a toenail is ingrown, it is curved and grows into the skin, usually at the nail borders (the sides of the nail). This  digging in  of the nail irritates the skin, often creating pain, redness, swelling, and warmth in the toe.  If an ingrown nail causes a break in the skin, bacteria may enter and cause an infection in the area, which is often marked by drainage and a foul odor. However, even if the toe isn t painful, red, swollen, or warm, a nail that curves downward into the skin can progress to an infection.  CAUSES  Causes of ingrown toenails include:    Heredity. In many people, the tendency for ingrown toenails is inherited.     Trauma. Sometimes an ingrown toenail is the result of trauma, such as stubbing your toe, having an object fall on your toe, or engaging in activities that involve repeated pressure on the toes, such as kicking or running.     Improper trimming. The most common cause of ingrown toenails is cutting your nails too short. This encourages the skin next to the nail to fold over the nail.     Improperly sized footwear. Ingrown toenails can result from wearing socks and shoes that are tight or short.     Nail Conditions. Ingrown toenails can be caused by nail problems, such as fungal infections or losing a nail due to trauma.   TREATMENT  Sometimes initial treatment for ingrown toenails can be safely performed at home. However, home treatment is strongly discouraged if an infection is suspected, or for those who have medical conditions that put feet at high risk, such as diabetes, nerve damage in the foot, or poor circulation.  Home care:  If you don t have an infection or any of the above medical conditions, you can soak your foot in room-temperature water (adding Epsom s salt may be recommended by your doctor), and gently massage the side of the nail fold to help reduce the inflammation.  Avoid attempting  bathroom surgery.  Repeated cutting of the  nail can cause the condition to worsen over time. If your symptoms fail to improve, it s time to see a foot and ankle surgeon.  Physician care:  After examining the toe, the foot and ankle surgeon will select the treatment best suited for you. If an infection is present, an oral antibiotic may be prescribed.  Sometimes a minor surgical procedure, often performed in the office, will ease the pain and remove the offending nail. After applying a local anesthetic, the doctor removes part of the nail s side border. Some nails may become ingrown again, requiring removal of the nail root.  Following the nail procedure, a light bandage will be applied. Most people experience very little pain after surgery and may resume normal activity the next day. If your surgeon has prescribed an oral antibiotic, be sure to take all the medication, even if your symptoms have improved.    PREVENTION  Many cases of ingrown toenails may be prevented by:    Proper trimming. Cut toenails in a fairly straight line, and don t cut them too short. You should be able to get your fingernail under the sides and end of the nail.     Well-fitted shoes and socks. Don t wear shoes that are short or tight in the toe area. Avoid shoes that are loose, because they too cause pressure on the toes, especially when running or walking briskly.     WOUND CARE RECOMMENDATIONS    1)  Keep the wound covered by a bandage when bathing.    2)  Gently clean the wound with soap water, separate from bath/shower water.      3)  Each day, apply a topical antibiotic ointment to the wound (Neosporin, Triple antibiotic, Bacitracin).   Cover with large band-aid or gauze.      4)  Please seek immediate medical attention if any increasing redness, swelling, drainage, smell, or pain related to the wound.     5)  Please return to clinic in the period of time requested.      Murray to follow up with Primary Care provider regarding elevated blood pressure.      BODY WEIGHT AND YOUR  FEET  The following information is included in the after visit summary for all patients. Body weight can be a sensitive issue to discuss in clinic, but we think the following information is very important. Although we focus on the feet and ankles, we do support the overall health of our patients.     Many things can cause foot and ankle problems. Foot structure, activity level, foot mechanics and injuries are common causes of pain. One very important issue that often goes unmentioned, is body weight. Extra weight can cause increased stress on muscles, ligaments, bones and tendons. Sometimes just a few extra pounds is all it takes to put one over her/his threshold. Without reducing that stress, it can be difficult to alleviate pain. As Foot & Ankle specialists, our job is addressing the lower extremity problem and possible causes. Regarding extra body weight, we encourage patients to discuss diet and weight management plans with their primary care doctors. It is this team approach that gives you the best opportunity for pain relief and getting you back on your feet.      [facility] has a Comprehensive Weight Management Program. This program includes counseling, education, non-surgical and surgical approaches to weight loss. If you are interested in learning more either talk to you primary care provider or call [phone].

## 2020-03-02 ENCOUNTER — TELEPHONE (OUTPATIENT)
Dept: TRANSPLANT | Facility: CLINIC | Age: 65
End: 2020-03-02

## 2020-03-02 DIAGNOSIS — Z94.0 KIDNEY REPLACED BY TRANSPLANT: ICD-10-CM

## 2020-03-02 DIAGNOSIS — Z79.899 LONG TERM USE OF DRUG: ICD-10-CM

## 2020-03-02 LAB
ANION GAP SERPL CALCULATED.3IONS-SCNC: 7 MMOL/L (ref 3–14)
BUN SERPL-MCNC: 22 MG/DL (ref 7–30)
CALCIUM SERPL-MCNC: 9.1 MG/DL (ref 8.5–10.1)
CHLORIDE SERPL-SCNC: 108 MMOL/L (ref 94–109)
CO2 SERPL-SCNC: 22 MMOL/L (ref 20–32)
CREAT SERPL-MCNC: 1.01 MG/DL (ref 0.66–1.25)
ERYTHROCYTE [DISTWIDTH] IN BLOOD BY AUTOMATED COUNT: 17.9 % (ref 10–15)
GFR SERPL CREATININE-BSD FRML MDRD: 78 ML/MIN/{1.73_M2}
GLUCOSE SERPL-MCNC: 198 MG/DL (ref 70–99)
HCT VFR BLD AUTO: 34.8 % (ref 40–53)
HGB BLD-MCNC: 11.9 G/DL (ref 13.3–17.7)
MAGNESIUM SERPL-MCNC: 1.5 MG/DL (ref 1.6–2.3)
MCH RBC QN AUTO: 35.1 PG (ref 26.5–33)
MCHC RBC AUTO-ENTMCNC: 34.2 G/DL (ref 31.5–36.5)
MCV RBC AUTO: 103 FL (ref 78–100)
PHOSPHATE SERPL-MCNC: 4 MG/DL (ref 2.5–4.5)
PLATELET # BLD AUTO: 158 10E9/L (ref 150–450)
POTASSIUM SERPL-SCNC: 4.2 MMOL/L (ref 3.4–5.3)
RBC # BLD AUTO: 3.39 10E12/L (ref 4.4–5.9)
SODIUM SERPL-SCNC: 138 MMOL/L (ref 133–144)
TACROLIMUS BLD-MCNC: 11.3 UG/L (ref 5–15)
TME LAST DOSE: 920 H
WBC # BLD AUTO: 3.7 10E9/L (ref 4–11)

## 2020-03-02 PROCEDURE — 87799 DETECT AGENT NOS DNA QUANT: CPT | Performed by: INTERNAL MEDICINE

## 2020-03-02 PROCEDURE — 80197 ASSAY OF TACROLIMUS: CPT | Performed by: INTERNAL MEDICINE

## 2020-03-02 NOTE — TELEPHONE ENCOUNTER
Pt called inquiring about his risk for Co-vid 19.    While his risk is higher because his immune system is suppressed; at this point it is low.     Instructed to be diligent with hand washing and sanitizing, avoid ill people - things he should already be doing. No need to routinely wear a mask.     The risk and need for precautions are being tracked and evaluated by the CDC and our infectious disease staff. Be diligent as he currently should be.

## 2020-03-03 ENCOUNTER — PATIENT OUTREACH (OUTPATIENT)
Dept: GASTROENTEROLOGY | Facility: CLINIC | Age: 65
End: 2020-03-03

## 2020-03-03 ENCOUNTER — TELEPHONE (OUTPATIENT)
Dept: TRANSPLANT | Facility: CLINIC | Age: 65
End: 2020-03-03

## 2020-03-03 DIAGNOSIS — K86.2 PANCREATIC CYST: Primary | ICD-10-CM

## 2020-03-03 LAB — ALLOSURE DD-CFDNA: NORMAL

## 2020-03-03 NOTE — PROGRESS NOTES
Called patient to organize annual imaging to monitor pancreatic cyst. Patient agreeable to procedure. Orders placed.  Imaging scheduling number sent via [employer]

## 2020-03-03 NOTE — TELEPHONE ENCOUNTER
ISSUE:   Tacrolimus IR level 11.3 on 3/2/2020, goal 8-10, dose 2.5 mg BID.    PLAN:   Please call patient and confirm this was an accurate 12-hour trough. Verify Tacrolimus IR dose 2.5 mg BID. Confirm no new medications or illness.   Ensure no recent diarrhea.  Ensure no tremors, headaches, or elevated BPs.  If accurate trough and accurate dose, stay on the same dose Tacrolimus IR and repeat labs as scheduled.    Cami Villarreal RN      OUTCOME:   Spoke with patient, they confirm accurate trough level and current dose 2.5 mg BID. Patient confirmed dose change to 2.5 mg BID and to repeat labs in 1 weeks. Orders sent to preferred pharmacy for dose change and lab for repeat labs. Patient voiced understanding of plan.

## 2020-03-09 DIAGNOSIS — Z94.0 KIDNEY REPLACED BY TRANSPLANT: ICD-10-CM

## 2020-03-09 DIAGNOSIS — Z79.899 LONG TERM USE OF DRUG: ICD-10-CM

## 2020-03-09 LAB
ANION GAP SERPL CALCULATED.3IONS-SCNC: 8 MMOL/L (ref 3–14)
BUN SERPL-MCNC: 18 MG/DL (ref 7–30)
CALCIUM SERPL-MCNC: 9.2 MG/DL (ref 8.5–10.1)
CHLORIDE SERPL-SCNC: 110 MMOL/L (ref 94–109)
CO2 SERPL-SCNC: 20 MMOL/L (ref 20–32)
CREAT SERPL-MCNC: 0.98 MG/DL (ref 0.66–1.25)
ERYTHROCYTE [DISTWIDTH] IN BLOOD BY AUTOMATED COUNT: 17.5 % (ref 10–15)
GFR SERPL CREATININE-BSD FRML MDRD: 81 ML/MIN/{1.73_M2}
GLUCOSE SERPL-MCNC: 187 MG/DL (ref 70–99)
HCT VFR BLD AUTO: 35.2 % (ref 40–53)
HGB BLD-MCNC: 12 G/DL (ref 13.3–17.7)
MCH RBC QN AUTO: 35.3 PG (ref 26.5–33)
MCHC RBC AUTO-ENTMCNC: 34.1 G/DL (ref 31.5–36.5)
MCV RBC AUTO: 104 FL (ref 78–100)
PLATELET # BLD AUTO: 156 10E9/L (ref 150–450)
POTASSIUM SERPL-SCNC: 4.1 MMOL/L (ref 3.4–5.3)
RBC # BLD AUTO: 3.4 10E12/L (ref 4.4–5.9)
SODIUM SERPL-SCNC: 137 MMOL/L (ref 133–144)
TACROLIMUS BLD-MCNC: 11.2 UG/L (ref 5–15)
TME LAST DOSE: 2105 H
WBC # BLD AUTO: 3.7 10E9/L (ref 4–11)

## 2020-03-09 PROCEDURE — 80197 ASSAY OF TACROLIMUS: CPT | Performed by: INTERNAL MEDICINE

## 2020-03-10 DIAGNOSIS — E87.20 METABOLIC ACIDOSIS: ICD-10-CM

## 2020-03-10 DIAGNOSIS — Z94.0 KIDNEY REPLACED BY TRANSPLANT: Primary | ICD-10-CM

## 2020-03-10 RX ORDER — TACROLIMUS 0.5 MG/1
0.5 CAPSULE ORAL EVERY MORNING
Qty: 30 CAPSULE | Refills: 11 | Status: SHIPPED | OUTPATIENT
Start: 2020-03-10 | End: 2020-03-24

## 2020-03-10 RX ORDER — TACROLIMUS 1 MG/1
2 CAPSULE ORAL 2 TIMES DAILY
Qty: 120 CAPSULE | Refills: 11 | Status: SHIPPED | OUTPATIENT
Start: 2020-03-10 | End: 2020-03-24

## 2020-03-10 NOTE — TELEPHONE ENCOUNTER
ISSUE:   Tacrolimus IR level 11.3 on 3/9, goal 8-10, dose 2.5 mg BID.    PLAN:   Please call patient and confirm this was an accurate 12-hour trough. Verify Tacrolimus IR dose 2.5 mg BID. Confirm no new medications or illness. Confirm no missed doses. If accurate trough and accurate dose, decrease Tacrolimus IR dose to 2.5mg AM, 2 mg PM and repeat labs as scheduled.    Cami Villarreal RN      OUTCOME:   Spoke with patient, they confirm accurate trough level and current dose 2.5 mg BID. Patient confirmed dose change to 2.5/2 mg BID and to repeat labs in 1 weeks. Orders sent to preferred pharmacy for dose change and lab for repeat labs. Patient voiced understanding of plan.

## 2020-03-11 ENCOUNTER — TELEPHONE (OUTPATIENT)
Dept: TRANSPLANT | Facility: CLINIC | Age: 65
End: 2020-03-11

## 2020-03-11 NOTE — TELEPHONE ENCOUNTER
Post Kidney and Pancreas Transplant Team Conference  Date: 3/11/2020  Transplant Coordinator: Lillie Ulloa, Cami Villarreal     Attendees:  [x]  Dr. Quijano  [x] Patt Johns LPN     []  Dr. Ulloa [x] Edith Ward RN [] Donna Wheat LPN   []  Dr. Escobar [] Marielena Romero RN     [] Radha Gray RN [x] Eduardo Lea, PharmD   [] Dr. Ayala [x] Jennifer Villarreal RN    [] Dr. Cheney [] Fantasma Dorsey RN    [] Dr. Chase [] Autumn Mendez RN    [x] Dr. Cowan [] Kari Carr, TONY     [] Anastasia Tanner RN    [] Surgery Fellow [x] Alma Rosa Monique RN    [] Maliha Jesus NP [] Scarlet Haines RN        Verbal Plan Read Back:   No changes at this time    Routed to RN Coordinator   Patt Johns LPN

## 2020-03-12 ENCOUNTER — VIRTUAL VISIT (OUTPATIENT)
Dept: FAMILY MEDICINE | Facility: OTHER | Age: 65
End: 2020-03-12

## 2020-03-12 NOTE — PROGRESS NOTES
"Date: 2020 12:26:13  Clinician: Rosa Mosley  Clinician NPI: [id number]  Patient: Murray Nicholson  Patient : 1955  Patient Address: [address], [place] [address]  Patient Phone: [phone]  Visit Protocol: URI  Patient Summary:  Murray is a 65 year old ( : 1955 ) male who initiated a Visit for cold, sinus infection, or influenza. When asked the question \"Please sign me up to receive news, health information and promotions. \", Murray responded \"No\".    Murray states his symptoms started gradually 3-6 days ago.   His symptoms consist of a sore throat.   Symptom details   Sore throat: Murray reports having mild throat pain (1-3 on a 10 point pain scale), does not have exudate on his tonsils, and can swallow liquids. He is not sure if the lymph nodes in his neck are enlarged. A rash has not appeared on the skin since the sore throat started.    Murray denies having rhinitis, wheezing, ear pain, malaise, cough, nasal congestion, fever, headache, teeth pain, chills, facial pain or pressure, and myalgias. He also denies double sickening (worsening symptoms after initial improvement), taking antibiotic medication for the symptoms, and having recent facial or sinus surgery in the past 60 days. He is not experiencing dyspnea.   Precipitating events  Within the past week, Murray has not been exposed to someone with strep throat.   Pertinent COVID-19 (Coronavirus) information  Murray has not traveled internationally in the last 14 days before the start of his symptoms.   Murray has not had close contact with a laboratory confirmed positive COVID-19 patient within 14 days of symptom onset.   Pertinent medical history  Murray does not need a return to work/school note.   Weight: 160 lbs   Murray does not smoke or use smokeless tobacco.   Weight: 160 lbs    MEDICATIONS: tacrolimus oral, ALLERGIES: omeprazole  Clinician Response:  Dear Murray,  Based on the information provided, you have viral pharyngitis. This " is a sore throat caused by a virus and is usually the first sign of a cold. Your sore throat should resolve in a couple days as other cold symptoms develop.  Unfortunately, there are no medications that can cure a cold, so treatment is focused on controlling symptoms as much as possible until you recover. Most people gradually feel better in 1-2 weeks.  Medication information  Because you have a viral infection, antibiotics will not help you get better. Treating a viral infection with antibiotics could actually make you feel worse.  Self care  The following tips will keep you as comfortable as possible while you recover:     Rest    Drink plenty of water and other liquids    Take a hot shower to loosen congestion    Use throat lozenges    Gargle with warm salt water (1/4 teaspoon of salt per 8 ounce glass of water)    Suck on frozen items such as popsicles or ice cubes    Drink hot tea with lemon and honey     When to seek care  Please be seen in a clinic or urgent care if new symptoms develop, or symptoms become worse.  Call 911 or go to the emergency room if you feel that your throat is closing off, you suddenly develop a rash, you are unable to swallow fluids, you are drooling, or you are having difficulty breathing.  COVID-19 (Coronavirus) General Information  We understand it may be concerning to be ill with symptoms that overlap with COVID-19 (Coronavirus) symptoms. Below are some helpful information on COVID-19 (Coronavirus).  How can I protect myself and others from the COVID-19 (Coronavirus)?  Because there is currently no vaccine to prevent infection, the best way to protect yourself is to avoid being exposed to this virus. The CDC recommends the following additional steps:     Wash your hands often with soap and water for at least 20 seconds, especially after blowing your nose, coughing, or sneezing; going to the bathroom; and before eating or preparing food.  Use an alcohol-based hand  that  contains at least 60 percent alcohol if soap and water are not available.        Avoid touching your eyes, nose and mouth with unwashed hands.    Avoid close contact with people who are sick.    Stay home when you are sick.    Cover your cough or sneeze with a tissue, then throw the tissue in the trash.    Clean and disinfect frequently touched objects and surfaces.     You can help stop COVID-19 (Coronavirus) by knowing the signs and symptoms:     Fever    Cough    Shortness of breath     Contact your healthcare provider if   Develop symptoms   AND   Have been in close contact with a person known to have COVID-19 (Coronavirus) or live in or have recently traveled from an area with ongoing spread of COVID-19 (Coronavirus). Call ahead before you go to a doctor's office or emergency room. Tell them about your recent travel and your symptoms.   For the most up to date information, visit the CDC's website.  Steps to help prevent the spread of COVID-19 (Coronavirus) if you are sick  If you are sick with COVID-19 (Coronavirus) or suspect you are infected with the virus that causes COVID-19 (Coronavirus), follow the steps below to help prevent the disease from spreading&nbsp;to people in your home and community.     Stay home except to get medical care. Home isolation may be started in consultation with your healthcare clinician.    Separate yourself from other people and animals in your home.    Call ahead before visiting your doctor if you have a medical appointment.    Wear a facemask when you are around other people.    Cover your cough and sneezes.    Clean your hands often.    Avoid sharing personal household items.    Clean and disinfect frequently touched objects and surfaces everyday.    You will need to have someone drop off medications or household supplies (if needed) at your house without coming inside or in contact with you or others living in your house.    Monitor your symptoms and seek prompt medical care  "if your illness is worsening (e.g. Difficulty breathing).    Discontinue home isolation only in consultation with your healthcare provider.     For more detailed and up to date information on what to do if you are sick, visit this link: What to Do If You Are Sick With Coronavirus Disease 2019 (COVID-19).  Do I need to be tested for COVID-19 (Coronavirus)?     At this time, the limited number of tests available are controlled by the state and local health departments and are being reserved for more seriously ill patients, those with known exposure to confirmed patients, and those with recent travel (within 14 days) to countries with high rates of COVID-19 (Coronavirus).    Decisions on which patients receive testing will be based on the local spread of COVID-19 (Coronavirus) as well as the symptoms. Your healthcare provider will make the final decision on whether you should be tested.    In the meantime, if you have concerns that you may have been exposed, it is reasonable to practice \"social distancing.\"&nbsp; If you are ill with a cold or flu like illness, please monitor your symptoms and reach out to your healthcare provider if your symptoms worsen.    For more up to date information, visit this link: COVID-19 (Coronavirus) Frequently Asked Questions and Answers.      Diagnosis: Viral pharyngitis  Diagnosis ICD: J02.9  "

## 2020-03-16 DIAGNOSIS — Z79.899 LONG TERM USE OF DRUG: ICD-10-CM

## 2020-03-16 DIAGNOSIS — Z94.0 KIDNEY REPLACED BY TRANSPLANT: ICD-10-CM

## 2020-03-16 LAB
ANION GAP SERPL CALCULATED.3IONS-SCNC: 6 MMOL/L (ref 3–14)
BUN SERPL-MCNC: 15 MG/DL (ref 7–30)
CALCIUM SERPL-MCNC: 8.9 MG/DL (ref 8.5–10.1)
CHLORIDE SERPL-SCNC: 109 MMOL/L (ref 94–109)
CO2 SERPL-SCNC: 22 MMOL/L (ref 20–32)
CREAT SERPL-MCNC: 0.96 MG/DL (ref 0.66–1.25)
ERYTHROCYTE [DISTWIDTH] IN BLOOD BY AUTOMATED COUNT: 18 % (ref 10–15)
GFR SERPL CREATININE-BSD FRML MDRD: 82 ML/MIN/{1.73_M2}
GLUCOSE SERPL-MCNC: 172 MG/DL (ref 70–99)
HCT VFR BLD AUTO: 36.1 % (ref 40–53)
HGB BLD-MCNC: 12.2 G/DL (ref 13.3–17.7)
MCH RBC QN AUTO: 35 PG (ref 26.5–33)
MCHC RBC AUTO-ENTMCNC: 33.8 G/DL (ref 31.5–36.5)
MCV RBC AUTO: 103 FL (ref 78–100)
PLATELET # BLD AUTO: 178 10E9/L (ref 150–450)
POTASSIUM SERPL-SCNC: 4 MMOL/L (ref 3.4–5.3)
RBC # BLD AUTO: 3.49 10E12/L (ref 4.4–5.9)
SODIUM SERPL-SCNC: 138 MMOL/L (ref 133–144)
TACROLIMUS BLD-MCNC: 10.2 UG/L (ref 5–15)
TME LAST DOSE: NORMAL H
WBC # BLD AUTO: 3.4 10E9/L (ref 4–11)

## 2020-03-16 PROCEDURE — 80197 ASSAY OF TACROLIMUS: CPT | Performed by: INTERNAL MEDICINE

## 2020-03-18 ENCOUNTER — ANCILLARY PROCEDURE (OUTPATIENT)
Dept: MRI IMAGING | Facility: CLINIC | Age: 65
End: 2020-03-18
Attending: INTERNAL MEDICINE
Payer: MEDICARE

## 2020-03-18 DIAGNOSIS — K86.2 PANCREATIC CYST: ICD-10-CM

## 2020-03-18 RX ORDER — GADOBUTROL 604.72 MG/ML
7.5 INJECTION INTRAVENOUS ONCE
Status: COMPLETED | OUTPATIENT
Start: 2020-03-18 | End: 2020-03-18

## 2020-03-18 RX ADMIN — GADOBUTROL 7.5 ML: 604.72 INJECTION INTRAVENOUS at 07:15

## 2020-03-18 NOTE — LETTER
Patient:  Murray Nicholson  :   1955  MRN:     [id number]        Mr.Emil Nicholson  [address]  [place] [address]        2020    Dear ,    We are writing to inform you of your test results. In short, there are no findings concerning for malignancy. One of your known cysts had a slight increase in size and this warrants an endoscopic ultrasound to look closer and perhaps sample the fluid. While this has some time sensitivity, I suggest this wait for perhaps 90 days as we manage the COVID pandemic.    I have included the formal documtentation of the results below. It continues to be a pleasure participating in your care.  Please feel free to contact our clinic with any further questions.      Sincerely,    Alon Don MD PhD FACG RAHUL ZAMUDIO  Director of Endoscopy  Associate Professor of Medicine, Surgery and Pediatrics  Interventional and Therapeutic Endoscopy    [facility]  Division of Gastroenterology and Hepatology  [facility] [address] [address]    New Consultations  [phone]  Procedure Scheduling [phone]  Clinical Nurse Coordinator [phone]  Clinical Fax   [phone]  Administrative   [phone]  Administrative Fax  [phone]        Resulted Orders   MR Abdomen MRCP w/o & w Contrast    Narrative    MRCP Without Contrast performed 2020    CLINICAL HISTORY: Pancreatic cyst    TECHNIQUE:  Images were acquired with and without intravenous  gadolinium contrast through the upper abdomen. The following MR images  were acquired without intravenous contrast: TrueFISP, multiplanar  T2-weighted, axial T1 in/out of phase, T2-weighted MRCP images, axial  diffusion-weighted and axial apparent diffusion coefficient.  T1-weighted images were obtained before contrast at the multiple time  points following contrast injection. 3-D reformatted images were  generated by the technologist. Contrast dose: 7.5  mm of Gadavist  intravenous contrast was administered without adverse effect.    Comparison study: MRI 04/08/2019, 04/19/2018; CT chest and pelvis  12/16/2018 11/16/2018 CT chest/abdomen/pelvis, 4/19/2018 MRI    FINDINGS:    Gallbladder/Biliary Tree: Normal gallbladder. No intra or extrahepatic  biliary dilation.    Pancreas: Redemonstration of the multiple thin-walled T2 hyperintense  cystic foci in the pancreas oriented about the main pancreatic duct.  At least one of these cystic foci in the pancreatic body has increased  in size compared to prior exam now measuring 1.5 x 1.7 cm in  craniocaudal and transverse dimension, measuring 1.4 x 1.4 cm on  04/08/2019 and 1.1 x 1.3 cm on prior exam 04/19/2018 (series 3001,  image 12; series. 78784, image 9). No suspicious features or abnormal  postcontrast enhancement associated with this lesion. No main  pancreatic ductal dilation. Normal T1 parenchymal signal.    Liver: Noncirrhotic liver morphology. No suspicious hepatic lesion.  Multiple hepatic cysts. No significant hepatic fat or iron deposition.    Spleen: Normal.    Kidneys: Atrophic changes of the native bilateral kidneys. Bilateral  thin-walled T2 hyperintense cysts, the largest arising from the  midpole of the right native kidney measuring up to 2.5 cm, previously  3.9 cm. No hydronephrosis. Visualized portions of the right lower  quadrant renal allograft appear within normal limits without  hydronephrosis.    Adrenal glands: Normal.    Bowel: No bowel obstruction or inflammation. Left lower quadrant  ostomy.    Lymph nodes: No lymphadenopathy.    Blood vessels: The major intra-abdominal vascular flow voids are  preserved on this noncontrast exam.    Lung bases: Clear.    Bones and soft tissues: No aggressive osseous lesion. Susceptibility  artifact from median sternotomy wires.    Mesentery and abdominal wall: Left lower quadrant ostomy defect.    Ascites: None.      Impression    IMPRESSION:   1.  Re-demonstration of the multiple pancreatic cystic lesions, one of  which has slightly increased in size compared to prior exam now  measuring 17 mm compared to 14 mm on prior exam. No suspicious  features associated within this lesion on today's exam. Although  findings may represent side branch ductal papillary mucinous neoplasm,  given the slightly greater than 20% growth on today's exam, consider  GI consultation for possible endoscopic ultrasound evaluation as per  guidelines below.  2. Atrophic native kidneys. Visualized portions of the new right lower  quadrant renal allograft appear within normal limits.    [facility] recommendations for asymptomatic pancreatic  cysts, modified from international consensus guidelines*   Size of largest cyst:   Less than 1 cm: Follow-up imaging in 6 months to 1 year, then lengthen  interval to 2-3 years if no change.  1-2 cm: Follow-up imaging at 6 months, then yearly for 2 years, then  lengthen interval if no change.   The optimal initial study or first follow-up exam is MRI performed  with intravenous gadolinium contrast to allow full cyst  characterization. Once characterized, future follow-up MRIs can be  performed without contrast. If MRI is contraindicated, CT with  contrast is recommended.   GI consultation for possible endoscopic ultrasound is recommended for  cysts with the following features: Size > 2 cm, >20% growth on  followup, wall thickening or enhancement, mural nodule, duct > 5 mm,  or abrupt change in duct caliber with distal atrophy. GI or surgical  consultation is also recommended for symptomatic cysts.   *Reference: International Consensus Guidelines for Management of IPMN  and MCN of the pancreas. Pancreatology: 12:(2012); 183-197.              CULLEN KWAN MD

## 2020-03-18 NOTE — DISCHARGE INSTRUCTIONS
MRI Contrast Discharge Instructions    The IV contrast you received today will pass out of your body in your  urine. This will happen in the next 24 hours. You will not feel this process.  Your urine will not change color.    Drink at least 4 extra glasses of water or juice today (unless your doctor  has restricted your fluids). This reduces the stress on your kidneys.  You may take your regular medicines.    If you are on dialysis: It is best to have dialysis today.    If you have a reaction: Most reactions happen right away. If you have  any new symptoms after leaving the hospital (such as hives or swelling),  call your hospital at the correct number below. Or call your family doctor.  If you have breathing distress or wheezing, call 911.    Special instructions: ***    I have read and understand the above information.    Signature:______________________________________ Date:___________    Staff:__________________________________________ Date:___________     Time:__________    [facility] Departments:    ___[place]: [phone]  ___[facility]: [phone]  ___[place]: [phone] ___[facility]: [phone]  ___[facility]: [phone]  ___[facility]: [phone]  ___[phone]  ___[facility]: [phone]  ___[phone]

## 2020-03-23 DIAGNOSIS — Z79.899 LONG TERM USE OF DRUG: ICD-10-CM

## 2020-03-23 DIAGNOSIS — Z94.0 KIDNEY REPLACED BY TRANSPLANT: ICD-10-CM

## 2020-03-23 LAB
ANION GAP SERPL CALCULATED.3IONS-SCNC: 4 MMOL/L (ref 3–14)
BUN SERPL-MCNC: 14 MG/DL (ref 7–30)
CALCIUM SERPL-MCNC: 8.8 MG/DL (ref 8.5–10.1)
CHLORIDE SERPL-SCNC: 108 MMOL/L (ref 94–109)
CO2 SERPL-SCNC: 24 MMOL/L (ref 20–32)
CREAT SERPL-MCNC: 0.98 MG/DL (ref 0.66–1.25)
ERYTHROCYTE [DISTWIDTH] IN BLOOD BY AUTOMATED COUNT: 17.5 % (ref 10–15)
GFR SERPL CREATININE-BSD FRML MDRD: 81 ML/MIN/{1.73_M2}
GLUCOSE SERPL-MCNC: 194 MG/DL (ref 70–99)
HCT VFR BLD AUTO: 35.3 % (ref 40–53)
HGB BLD-MCNC: 12.2 G/DL (ref 13.3–17.7)
MCH RBC QN AUTO: 36.4 PG (ref 26.5–33)
MCHC RBC AUTO-ENTMCNC: 34.6 G/DL (ref 31.5–36.5)
MCV RBC AUTO: 105 FL (ref 78–100)
PLATELET # BLD AUTO: 158 10E9/L (ref 150–450)
POTASSIUM SERPL-SCNC: 4.1 MMOL/L (ref 3.4–5.3)
RBC # BLD AUTO: 3.35 10E12/L (ref 4.4–5.9)
SODIUM SERPL-SCNC: 137 MMOL/L (ref 133–144)
TACROLIMUS BLD-MCNC: 12.7 UG/L (ref 5–15)
TME LAST DOSE: NORMAL H
WBC # BLD AUTO: 3.4 10E9/L (ref 4–11)

## 2020-03-23 PROCEDURE — 80197 ASSAY OF TACROLIMUS: CPT | Performed by: INTERNAL MEDICINE

## 2020-03-24 ENCOUNTER — TELEPHONE (OUTPATIENT)
Dept: TRANSPLANT | Facility: CLINIC | Age: 65
End: 2020-03-24

## 2020-03-24 DIAGNOSIS — Z94.0 KIDNEY REPLACED BY TRANSPLANT: ICD-10-CM

## 2020-03-24 DIAGNOSIS — E87.20 METABOLIC ACIDOSIS: ICD-10-CM

## 2020-03-24 RX ORDER — TACROLIMUS 1 MG/1
2 CAPSULE ORAL 2 TIMES DAILY
Qty: 120 CAPSULE | Refills: 11 | Status: SHIPPED | OUTPATIENT
Start: 2020-03-24 | End: 2020-05-19

## 2020-03-24 RX ORDER — TACROLIMUS 0.5 MG/1
CAPSULE ORAL
Qty: 30 CAPSULE | Refills: 11 | Status: SHIPPED | OUTPATIENT
Start: 2020-03-24 | End: 2020-05-19

## 2020-03-24 NOTE — TELEPHONE ENCOUNTER
ISSUE:   Tacrolimus IR level 12.7 on 3/23, goal 8-10, dose 2.5mg AM, 2 mg PM.    Continues on Valtrex, now 3 months post transplant.    PLAN:   Please call patient and confirm this was an accurate 12-hour trough. Verify Tacrolimus IR dose as above. Confirm no new medications or illness. Confirm no missed doses. If accurate trough and accurate dose, decrease Tacrolimus IR dose to 2 mg BID and repeat labs as scheduled.    Ensure valtrex not needed for any other prophylactic, if so, discontinue.     OUTCOME:   Spoke with patient, they confirm accurate trough level and current dose 2.5mg AM, 2mg PM. Patient confirmed dose change to 2 mg BID and to repeat labs in as scheduled. Orders sent to preferred pharmacy for dose change and lab for repeat labs. Patient voiced understanding of plan.     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 This Rx was prescribed by derm, completed pre-transplant.  RNCC advised OK to stop valtrex. If outbreak symptoms occur, discuss again with dermatology for restart of valtrex.     Murray voiced understanding.

## 2020-03-26 DIAGNOSIS — E87.20 METABOLIC ACIDOSIS: ICD-10-CM

## 2020-03-26 DIAGNOSIS — Z94.0 KIDNEY REPLACED BY TRANSPLANT: ICD-10-CM

## 2020-03-26 RX ORDER — SODIUM BICARBONATE 650 MG/1
1300 TABLET ORAL DAILY
Qty: 120 TABLET | Refills: 2 | COMMUNITY
Start: 2020-03-26 | End: 2020-07-06

## 2020-03-26 RX ORDER — MYCOPHENOLATE MOFETIL 250 MG/1
750 CAPSULE ORAL 2 TIMES DAILY
Qty: 180 CAPSULE | Refills: 11 | Status: SHIPPED | OUTPATIENT
Start: 2020-03-26 | End: 2020-05-29

## 2020-03-26 RX ORDER — VITAMIN B COMPLEX
25 TABLET ORAL DAILY
Qty: 30 TABLET | Refills: 1 | Status: SHIPPED | OUTPATIENT
Start: 2020-03-26 | End: 2020-05-28

## 2020-03-26 RX ORDER — MYCOPHENOLATE MOFETIL 250 MG/1
750 CAPSULE ORAL 2 TIMES DAILY
Qty: 180 CAPSULE | Refills: 11 | Status: SHIPPED | OUTPATIENT
Start: 2020-03-26 | End: 2020-03-26

## 2020-03-30 ENCOUNTER — TELEPHONE (OUTPATIENT)
Dept: TRANSPLANT | Facility: CLINIC | Age: 65
End: 2020-03-30

## 2020-03-30 DIAGNOSIS — Z94.0 KIDNEY REPLACED BY TRANSPLANT: ICD-10-CM

## 2020-03-30 DIAGNOSIS — Z79.899 LONG TERM USE OF DRUG: ICD-10-CM

## 2020-03-30 LAB
ANION GAP SERPL CALCULATED.3IONS-SCNC: 9 MMOL/L (ref 3–14)
BUN SERPL-MCNC: 19 MG/DL (ref 7–30)
CALCIUM SERPL-MCNC: 8.8 MG/DL (ref 8.5–10.1)
CHLORIDE SERPL-SCNC: 106 MMOL/L (ref 94–109)
CO2 SERPL-SCNC: 22 MMOL/L (ref 20–32)
CREAT SERPL-MCNC: 0.93 MG/DL (ref 0.66–1.25)
ERYTHROCYTE [DISTWIDTH] IN BLOOD BY AUTOMATED COUNT: 17.5 % (ref 10–15)
GFR SERPL CREATININE-BSD FRML MDRD: 86 ML/MIN/{1.73_M2}
GLUCOSE SERPL-MCNC: 203 MG/DL (ref 70–99)
HCT VFR BLD AUTO: 37.1 % (ref 40–53)
HGB BLD-MCNC: 12.5 G/DL (ref 13.3–17.7)
MCH RBC QN AUTO: 35.8 PG (ref 26.5–33)
MCHC RBC AUTO-ENTMCNC: 33.7 G/DL (ref 31.5–36.5)
MCV RBC AUTO: 106 FL (ref 78–100)
PLATELET # BLD AUTO: 160 10E9/L (ref 150–450)
POTASSIUM SERPL-SCNC: 4 MMOL/L (ref 3.4–5.3)
RBC # BLD AUTO: 3.49 10E12/L (ref 4.4–5.9)
SODIUM SERPL-SCNC: 138 MMOL/L (ref 133–144)
TACROLIMUS BLD-MCNC: 9.6 UG/L (ref 5–15)
TME LAST DOSE: NORMAL H
WBC # BLD AUTO: 3.7 10E9/L (ref 4–11)

## 2020-03-30 PROCEDURE — 80197 ASSAY OF TACROLIMUS: CPT | Performed by: INTERNAL MEDICINE

## 2020-03-30 NOTE — TELEPHONE ENCOUNTER
ISSUE:  Blood sugars on lab draws consistently >170    PLAN:  Ensure these are fasting.  Murray to work with endocrinology or PCP to ensure improved blood sugars and protect transplanted kidney from any potential diabetic-induced damage.    OUTCOME:  Murray reports he has been working with Eduardo Lea PharmD for sugar control.  RNCC communicated with Eduardo to discuss improved management.  Murray voiced understanding as to why blood sugar management is so important at this time.

## 2020-04-03 ENCOUNTER — ALLIED HEALTH/NURSE VISIT (OUTPATIENT)
Dept: PHARMACY | Facility: CLINIC | Age: 65
End: 2020-04-03

## 2020-04-03 DIAGNOSIS — Z94.0 KIDNEY REPLACED BY TRANSPLANT: Primary | ICD-10-CM

## 2020-04-03 DIAGNOSIS — K21.9 GASTROESOPHAGEAL REFLUX DISEASE WITHOUT ESOPHAGITIS: ICD-10-CM

## 2020-04-03 DIAGNOSIS — E11.29 TYPE 2 DIABETES MELLITUS WITH OTHER DIABETIC KIDNEY COMPLICATION, WITHOUT LONG-TERM CURRENT USE OF INSULIN (H): ICD-10-CM

## 2020-04-03 PROCEDURE — 99606 MTMS BY PHARM EST 15 MIN: CPT | Performed by: PHARMACIST

## 2020-04-03 RX ORDER — FAMOTIDINE 20 MG/1
20 TABLET, FILM COATED ORAL 2 TIMES DAILY
Qty: 30 TABLET | Refills: 3 | Status: SHIPPED | OUTPATIENT
Start: 2020-04-03 | End: 2020-06-05

## 2020-04-03 RX ORDER — PANTOPRAZOLE SODIUM 20 MG/1
20 TABLET, DELAYED RELEASE ORAL DAILY
Qty: 30 TABLET | Refills: 3 | Status: SHIPPED | OUTPATIENT
Start: 2020-04-03 | End: 2020-04-21

## 2020-04-03 NOTE — Clinical Note
Jennifer,  Patients fasting sugars are between 110-140. I think the lab glucoses are only high because they are 1-1.5 hours after his breakfast. Pretty near the post prandial goal of <180. I will follow up in a several weeks and have him check at other times during the day to see whats going on.

## 2020-04-03 NOTE — PROGRESS NOTES
SUBJECTIVE/OBJECTIVE:                Murray Nicholson is a 64 year old male coming-in for a Follow-up MTM visit.  He was referred post txp.     Chief Complaint: diabetes follow-up.       Allergies/ADRs: Reviewed in Epic  Tobacco:  reports that he quit smoking about 25 years ago. His smoking use included cigarettes. He started smoking about 36 years ago. He has a 20.00 pack-year smoking history. He has never used smokeless tobacco.  Alcohol: not currently using  Caffeine: 0-1 cups/day of coffee, 1 cups/day of tea  PMH: Reviewed in Epic    Medication Adherence/Access:  Patient uses pill box(es).  Patient takes medications 4 time(s) per day. 9am and 9pm  Per patient, misses medication 0 times per week.   Medication barriers: none. Insulin is enpensive.   The patient fills medications at [place] [facility]: NO, fills medications at [facility].    Renal/ post heart Transplant:  Current immunosuppressants include Tacrolimus 2 mg  BIDPM (0-6 months post tx, goal 8-10) and MMF 750mg BID (goal 0-6 month post tx: 1-3.5).  Pt reports no side effects  Transplant date: 12/19/19 renal, heart txp in 6/14/2018  Estimated Creatinine Clearance: 80.8 mL/min (based on SCr of 0.93 mg/dL).  PCP prophylaxis: Bactrim S S daily  Current supplements for electrolyte replacement: Mag Oxide 800mg daily ( 2 hours from MMF), sodium bicarbonate 1300 mg BID.  Magnesium   Date Value Ref Range Status   03/02/2020 1.5 (L) 1.6 - 2.3 mg/dL Final   Tx Coordinator: Jennifer Villarreal Tx MD: Dr. Quijano, Using Med Card: Yes  Immunizations: annual flu shot 2019; [id number]:  2005, 2011; Prevnar 13: 2015; TDaP:  2013; Shingrix: Zostavax 2015    Diabetes:  Pt currently taking Lantus 10 units daily.  Pt is not experiencing side effects.  SMBG: one to two times daily.   Ranges (patient reported):   Date FBG/ 1-1.5hours post   4/3 133   4/2 124   4/1 115   3/31 123   3/30 126/203   3/29 138   3/28 133   3/27 141   3/26 139   3/25 136     Date FBG/ 2hours post   2/19  115   2/18 132   2/17 133   2/16 136   2/15 134   2/14 132   2/13 140     Patient is not experiencing hypoglycemia  Recent symptoms of high blood sugar? None  Lab Results   Component Value Date    A1C 4.5 12/24/2019    A1C 4.3 12/20/2019    A1C 5.2 12/10/2019    A1C 5.6 09/23/2019    A1C 5.2 06/10/2019     GERD: Current medications include: Protonix (pantoprazole) 40 mg once daily. Pt c/o no current symptoms. Had some rebound heartburn when he tried coming off of this after a few days.  Patient feels that current regimen is effective.    Today's Vitals: There were no vitals taken for this visit.    ASSESSMENT:                 Medication Adherence: good, no issues today.    Renal/ post heart Transplant:  Stable.     Diabetes: Stable. Patient's FBG are at goal of , labs have been about 1-1.5 hours post breakfast and are near the <180 goal for that time frame. Patient will start checking 1 other time during the day, either before lunch or dinner.     GERD: Needs improvement. Pt was unable to quit PPI cold turkey. Will start Pantoprazole 20mg daily + Famotidine prn for rebound heartburn.     PLAN:                Pt to...  1. Reduce Pantoprazole to 20mg once daily. May take Famotidine 20mg prn for rebound GERD sx.  2. Start checking blood sugars one other time during the day, either before lunch or dinner.     I spent 15 minutes with this patient today. I offer these suggestions for consideration by txp team and CPA with PCP. A copy of the visit note was provided to the patient's referring provider.    Will follow up in 2 weeks.    The patient was given a summary of these recommendations as an after visit summary.    Eduardo Lea, PharmD  [facility] Pharmacist    Phone: [phone]

## 2020-04-06 DIAGNOSIS — Z79.899 LONG TERM USE OF DRUG: ICD-10-CM

## 2020-04-06 DIAGNOSIS — Z94.0 KIDNEY REPLACED BY TRANSPLANT: ICD-10-CM

## 2020-04-06 LAB
ANION GAP SERPL CALCULATED.3IONS-SCNC: 8 MMOL/L (ref 3–14)
BUN SERPL-MCNC: 17 MG/DL (ref 7–30)
CALCIUM SERPL-MCNC: 8.6 MG/DL (ref 8.5–10.1)
CHLORIDE SERPL-SCNC: 108 MMOL/L (ref 94–109)
CO2 SERPL-SCNC: 22 MMOL/L (ref 20–32)
CREAT SERPL-MCNC: 0.97 MG/DL (ref 0.66–1.25)
ERYTHROCYTE [DISTWIDTH] IN BLOOD BY AUTOMATED COUNT: 16 % (ref 10–15)
GFR SERPL CREATININE-BSD FRML MDRD: 81 ML/MIN/{1.73_M2}
GLUCOSE SERPL-MCNC: 182 MG/DL (ref 70–99)
HCT VFR BLD AUTO: 35 % (ref 40–53)
HGB BLD-MCNC: 12.1 G/DL (ref 13.3–17.7)
MAGNESIUM SERPL-MCNC: 1.5 MG/DL (ref 1.6–2.3)
MCH RBC QN AUTO: 36.7 PG (ref 26.5–33)
MCHC RBC AUTO-ENTMCNC: 34.6 G/DL (ref 31.5–36.5)
MCV RBC AUTO: 106 FL (ref 78–100)
PHOSPHATE SERPL-MCNC: 3.5 MG/DL (ref 2.5–4.5)
PLATELET # BLD AUTO: 171 10E9/L (ref 150–450)
POTASSIUM SERPL-SCNC: 3.9 MMOL/L (ref 3.4–5.3)
RBC # BLD AUTO: 3.3 10E12/L (ref 4.4–5.9)
SODIUM SERPL-SCNC: 138 MMOL/L (ref 133–144)
TACROLIMUS BLD-MCNC: 8.8 UG/L (ref 5–15)
TME LAST DOSE: NORMAL H
WBC # BLD AUTO: 3 10E9/L (ref 4–11)

## 2020-04-06 PROCEDURE — 87799 DETECT AGENT NOS DNA QUANT: CPT | Performed by: INTERNAL MEDICINE

## 2020-04-06 PROCEDURE — 80197 ASSAY OF TACROLIMUS: CPT | Performed by: INTERNAL MEDICINE

## 2020-04-10 ENCOUNTER — PATIENT OUTREACH (OUTPATIENT)
Dept: GASTROENTEROLOGY | Facility: CLINIC | Age: 65
End: 2020-04-10

## 2020-04-10 ENCOUNTER — PREP FOR PROCEDURE (OUTPATIENT)
Dept: GASTROENTEROLOGY | Facility: CLINIC | Age: 65
End: 2020-04-10

## 2020-04-10 DIAGNOSIS — K86.2 PANCREAS CYST: Primary | ICD-10-CM

## 2020-04-10 NOTE — PROGRESS NOTES
Per Dr. Don letter/ follow up from imaging:    While this has some time sensitivity, I suggest this wait for perhaps 90 days as we manage the COVID pandemic    Please assist in scheduling:     Procedure/Imaging/Clinic: E[facility]  Physician: Dr. Don   Timing: Post Covid  Procedure length:50 minutes  Anesthesia:general  Dx: pancreatic cyst  Location: [facility]     Orders placed

## 2020-04-13 ENCOUNTER — TELEPHONE (OUTPATIENT)
Dept: TRANSPLANT | Facility: CLINIC | Age: 65
End: 2020-04-13

## 2020-04-13 ENCOUNTER — MYC MEDICAL ADVICE (OUTPATIENT)
Dept: TRANSPLANT | Facility: CLINIC | Age: 65
End: 2020-04-13

## 2020-04-13 DIAGNOSIS — Z94.0 KIDNEY REPLACED BY TRANSPLANT: Primary | ICD-10-CM

## 2020-04-13 DIAGNOSIS — Z79.899 LONG TERM USE OF DRUG: ICD-10-CM

## 2020-04-13 DIAGNOSIS — Z94.0 KIDNEY REPLACED BY TRANSPLANT: ICD-10-CM

## 2020-04-13 DIAGNOSIS — Z94.0 KIDNEY TRANSPLANTED: Primary | ICD-10-CM

## 2020-04-13 LAB
ANION GAP SERPL CALCULATED.3IONS-SCNC: 8 MMOL/L (ref 3–14)
BUN SERPL-MCNC: 20 MG/DL (ref 7–30)
CALCIUM SERPL-MCNC: 9 MG/DL (ref 8.5–10.1)
CHLORIDE SERPL-SCNC: 108 MMOL/L (ref 94–109)
CO2 SERPL-SCNC: 22 MMOL/L (ref 20–32)
CREAT SERPL-MCNC: 0.97 MG/DL (ref 0.66–1.25)
ERYTHROCYTE [DISTWIDTH] IN BLOOD BY AUTOMATED COUNT: 14.4 % (ref 10–15)
GFR SERPL CREATININE-BSD FRML MDRD: 81 ML/MIN/{1.73_M2}
GLUCOSE SERPL-MCNC: 151 MG/DL (ref 70–99)
HCT VFR BLD AUTO: 36.1 % (ref 40–53)
HGB BLD-MCNC: 12.4 G/DL (ref 13.3–17.7)
MCH RBC QN AUTO: 35.8 PG (ref 26.5–33)
MCHC RBC AUTO-ENTMCNC: 34.3 G/DL (ref 31.5–36.5)
MCV RBC AUTO: 104 FL (ref 78–100)
PLATELET # BLD AUTO: 182 10E9/L (ref 150–450)
POTASSIUM SERPL-SCNC: 4 MMOL/L (ref 3.4–5.3)
PTH-INTACT SERPL-MCNC: 149 PG/ML (ref 18–80)
RBC # BLD AUTO: 3.46 10E12/L (ref 4.4–5.9)
SODIUM SERPL-SCNC: 138 MMOL/L (ref 133–144)
TACROLIMUS BLD-MCNC: 8.5 UG/L (ref 5–15)
TME LAST DOSE: NORMAL H
WBC # BLD AUTO: 3.5 10E9/L (ref 4–11)

## 2020-04-13 PROCEDURE — 83970 ASSAY OF PARATHORMONE: CPT | Performed by: INTERNAL MEDICINE

## 2020-04-13 PROCEDURE — 80197 ASSAY OF TACROLIMUS: CPT | Performed by: INTERNAL MEDICINE

## 2020-04-13 PROCEDURE — 87799 DETECT AGENT NOS DNA QUANT: CPT | Performed by: INTERNAL MEDICINE

## 2020-04-13 NOTE — TELEPHONE ENCOUNTER
4 month items due for lab (not drawn or able to be added-on 4/13/2020):    PRA / DSA  UPC  Allosure    Will need to ensure labs are drawn on 4/20 for the above orders.    Murray voiced understanding and voiced understanding that labs will only need to be drawn every other week.    Will need to update standing orders to ensure labs now through 7 months post transplant are drawn e/o week.

## 2020-04-16 ENCOUNTER — TELEPHONE (OUTPATIENT)
Dept: TRANSPLANT | Facility: CLINIC | Age: 65
End: 2020-04-16

## 2020-04-16 NOTE — TELEPHONE ENCOUNTER
April 16, 2020 10:03 AM -  AIVERSE1: called pt to switch to [email or web address] labs done day before

## 2020-04-17 ENCOUNTER — ALLIED HEALTH/NURSE VISIT (OUTPATIENT)
Dept: PHARMACY | Facility: CLINIC | Age: 65
End: 2020-04-17

## 2020-04-17 DIAGNOSIS — Z94.0 KIDNEY REPLACED BY TRANSPLANT: ICD-10-CM

## 2020-04-17 DIAGNOSIS — K21.9 GASTROESOPHAGEAL REFLUX DISEASE WITHOUT ESOPHAGITIS: ICD-10-CM

## 2020-04-17 DIAGNOSIS — E11.29 TYPE 2 DIABETES MELLITUS WITH OTHER DIABETIC KIDNEY COMPLICATION, WITHOUT LONG-TERM CURRENT USE OF INSULIN (H): Primary | ICD-10-CM

## 2020-04-17 DIAGNOSIS — Z94.1 HEART TRANSPLANTED (H): ICD-10-CM

## 2020-04-17 PROCEDURE — 99606 MTMS BY PHARM EST 15 MIN: CPT | Performed by: PHARMACIST

## 2020-04-17 PROCEDURE — 99607 MTMS BY PHARM ADDL 15 MIN: CPT | Performed by: PHARMACIST

## 2020-04-17 RX ORDER — METFORMIN HCL 500 MG
500 TABLET, EXTENDED RELEASE 24 HR ORAL
Qty: 180 TABLET | Refills: 1 | Status: SHIPPED | OUTPATIENT
Start: 2020-04-17 | End: 2020-05-08

## 2020-04-17 NOTE — PATIENT INSTRUCTIONS
Recommendations from today's MTM visit:                                                      1. On 5/1, stop Pantoprazole. Continue Famotidine twice daily for 1-2 weeks, then start reducing every week. You can use Famotidine as needed for heartburn symptoms.    2. Metformin ER 500mg 1 tablet once daily for one week, then increase to 2 tablets once daily.    3. Reduce Lantus to 7 units when you start Metformin, then when you increase Metformin to 2 tablets, drop Lantus again to 5 units. If fasting sugars come in at <100, you can stop the Lantus.     It was great to speak with you today.  I value your experience and would be very thankful for your time with providing feedback on our clinic survey. You may receive a survey via email or text message in the next few days.     Next MTM visit: 5/8 at 11am over the phone    To schedule another MTM appointment, please call the clinic directly or you may call the MTM scheduling line at [phone] or toll-free at [phone].     My Clinical Pharmacist's contact information:                                                      It was a pleasure talking with you today!  Please feel free to contact me with any questions or concerns you have.      Eduardo Lea, PharmD  [facility] Pharmacist    Phone: [phone]

## 2020-04-17 NOTE — PROGRESS NOTES
SUBJECTIVE/OBJECTIVE:                Murray Nicholson is a 64 year old male coming-in for a Follow-up MTM visit.  He was referred post txp.     Chief Complaint: diabetes follow-up.       Allergies/ADRs: Reviewed in Epic  Tobacco:  reports that he quit smoking about 25 years ago. His smoking use included cigarettes. He started smoking about 36 years ago. He has a 20.00 pack-year smoking history. He has never used smokeless tobacco.  Alcohol: not currently using  Caffeine: 0-1 cups/day of coffee, 1 cups/day of tea  PMH: Reviewed in Epic    Medication Adherence/Access:  Patient uses pill box(es).  Patient takes medications 4 time(s) per day. 9am and 9pm  Per patient, misses medication 0 times per week.   Medication barriers: none. Insulin is enpensive.   The patient fills medications at [place]: NO, fills medications at [facility].    Renal/ post heart Transplant:  Current immunosuppressants include Tacrolimus 2 mg  BID (0-6 months post tx, goal 8-10) and MMF 750mg BID (goal 0-6 month post tx: 1-3.5).  Pt reports no side effects  Transplant date: 12/19/19 renal, heart txp in 6/14/2018  Estimated Creatinine Clearance: 77.4 mL/min (based on SCr of 0.97 mg/dL).  PCP prophylaxis: Bactrim S S daily  Current supplements for electrolyte replacement: Mag Oxide 800mg daily ( 2 hours from MMF), sodium bicarbonate 1300 mg BID.  Magnesium   Date Value Ref Range Status   04/06/2020 1.5 (L) 1.6 - 2.3 mg/dL Final   Tx Coordinator: Jennifer Villarreal Tx MD: Dr. Quijano, Using Med Card: Yes  Immunizations: annual flu shot 2019; [id number]:  2005, 2011; Prevnar 13: 2015; TDaP:  2013; Shingrix: Zostavax 2015    Diabetes:  Pt currently taking Lantus 10 units daily.  Pt is not experiencing side effects.  SMBG: one to two times daily.   Ranges (patient reported):   Date FBG/ 2hours post Lunch/2hours post Dinner /2hours post   4/17 119     4/16 133  122   4/15 120  154   4/14 116 149    4/13 115  175   4/12 122 101    4/11 125  166    4/10 127     4/9  118      Date FBG/ 1-1.5hours post   4/3 133   4/2 124   4/1 115   3/31 123   3/30 126/203   3/29 138   3/28 133   3/27 141   3/26 139   3/25 136     Patient is not experiencing hypoglycemia  Recent symptoms of high blood sugar? None  Lab Results   Component Value Date    A1C 4.5 12/24/2019    A1C 4.3 12/20/2019    A1C 5.2 12/10/2019    A1C 5.6 09/23/2019    A1C 5.2 06/10/2019     GERD: Current medications include: Protonix (pantoprazole) 20 mg once daily, Famotidine 20mg twice daily. Pt c/o no current symptoms.   Patient feels that current regimen is effective.    Today's Vitals: There were no vitals taken for this visit.    ASSESSMENT:                 Medication Adherence: good, no issues today.    Renal/ post heart Transplant:  Stable.     Diabetes: Patient's FBG are at goal of , labs have been about 1-1.5 hours post breakfast and are near the <180 goal for that time frame. Patient will start checking 1 other time during the day, either before lunch or dinner. Recommend starting Metformin due to better cardiac outcomes compared to insulin. Will titrate Metformin to 1000mg over a couple weeks with corresponding Lantus dose reductions.     GERD: Patient to stop Pantoprazole 5/1, can start tapering off Famotidine and use PRN 1-2 weeks after that.      PLAN:                Pt to...  1. Stop Pantoprazole on 5/1, taper off Famotidine a couple of weeks after that.  2. Start Metformin ER 500mg once daily for one week, then increase to 2 tablets once daily.   3. Reduce Lantus to 7 units when you start Metformin, then when you increase Metformin to 2 tablets, drop Lantus again to 5 units. If fasting sugars come in at <100, you can stop the Lantus.     I spent 20 minutes with this patient today. I offer these suggestions for consideration by txp team and CPA with PCP. A copy of the visit note was provided to the patient's referring provider.    Will follow up in 3 weeks.    The patient was given a  summary of these recommendations as an after visit summary.    Eduardo Lea, PharmD  [place] Pharmacist    Phone: [phone]

## 2020-04-20 ENCOUNTER — RESULTS ONLY (OUTPATIENT)
Dept: OTHER | Facility: CLINIC | Age: 65
End: 2020-04-20

## 2020-04-20 ENCOUNTER — TELEPHONE (OUTPATIENT)
Dept: TRANSPLANT | Facility: CLINIC | Age: 65
End: 2020-04-20

## 2020-04-20 DIAGNOSIS — Z94.0 KIDNEY TRANSPLANTED: ICD-10-CM

## 2020-04-20 DIAGNOSIS — Z94.0 KIDNEY REPLACED BY TRANSPLANT: ICD-10-CM

## 2020-04-20 DIAGNOSIS — Z79.899 LONG TERM USE OF DRUG: ICD-10-CM

## 2020-04-20 LAB
ANION GAP SERPL CALCULATED.3IONS-SCNC: 5 MMOL/L (ref 3–14)
BUN SERPL-MCNC: 20 MG/DL (ref 7–30)
CALCIUM SERPL-MCNC: 9.1 MG/DL (ref 8.5–10.1)
CHLORIDE SERPL-SCNC: 107 MMOL/L (ref 94–109)
CO2 SERPL-SCNC: 26 MMOL/L (ref 20–32)
CREAT SERPL-MCNC: 1.07 MG/DL (ref 0.66–1.25)
CREAT UR-MCNC: 56 MG/DL
ERYTHROCYTE [DISTWIDTH] IN BLOOD BY AUTOMATED COUNT: 13.2 % (ref 10–15)
GFR SERPL CREATININE-BSD FRML MDRD: 72 ML/MIN/{1.73_M2}
GLUCOSE SERPL-MCNC: 176 MG/DL (ref 70–99)
HCT VFR BLD AUTO: 35 % (ref 40–53)
HGB BLD-MCNC: 11.9 G/DL (ref 13.3–17.7)
MCH RBC QN AUTO: 35.6 PG (ref 26.5–33)
MCHC RBC AUTO-ENTMCNC: 34 G/DL (ref 31.5–36.5)
MCV RBC AUTO: 105 FL (ref 78–100)
PLATELET # BLD AUTO: 164 10E9/L (ref 150–450)
POTASSIUM SERPL-SCNC: 4.4 MMOL/L (ref 3.4–5.3)
PROT UR-MCNC: 0.11 G/L
PROT/CREAT 24H UR: 0.2 G/G CR (ref 0–0.2)
RBC # BLD AUTO: 3.34 10E12/L (ref 4.4–5.9)
SODIUM SERPL-SCNC: 137 MMOL/L (ref 133–144)
TACROLIMUS BLD-MCNC: 9.2 UG/L (ref 5–15)
TME LAST DOSE: NORMAL H
WBC # BLD AUTO: 2.8 10E9/L (ref 4–11)

## 2020-04-20 PROCEDURE — 86832 HLA CLASS I HIGH DEFIN QUAL: CPT | Performed by: INTERNAL MEDICINE

## 2020-04-20 PROCEDURE — 86833 HLA CLASS II HIGH DEFIN QUAL: CPT | Performed by: INTERNAL MEDICINE

## 2020-04-20 PROCEDURE — 80197 ASSAY OF TACROLIMUS: CPT | Performed by: INTERNAL MEDICINE

## 2020-04-20 NOTE — TELEPHONE ENCOUNTER
Acute Transplant Nephrology Clinic Visit with Care Coordinator: 4 month follow up (4/21/2020)    Overdue for labs: No   If yes, which lab: NA   Patient informed of need for draw: NA  Current lab schedule: labs every other week   Patient voiced understanding: Yes (previously communicated to Murray - see Epic note 4/13/2020).    Medication compliant: YES      PJP prophylaxis up to date: Yes, bactrim daily.        **This was a chart check, no communication was made to the patient regarding this information 4/20/2020.  Cami Villarreal RN

## 2020-04-21 ENCOUNTER — VIRTUAL VISIT (OUTPATIENT)
Dept: NEPHROLOGY | Facility: CLINIC | Age: 65
End: 2020-04-21
Attending: INTERNAL MEDICINE
Payer: MEDICARE

## 2020-04-21 VITALS
WEIGHT: 166.45 LBS | DIASTOLIC BLOOD PRESSURE: 87 MMHG | SYSTOLIC BLOOD PRESSURE: 120 MMHG | BODY MASS INDEX: 24.76 KG/M2 | HEART RATE: 85 BPM | TEMPERATURE: 95.4 F

## 2020-04-21 DIAGNOSIS — N18.30 ANEMIA IN STAGE 3 CHRONIC KIDNEY DISEASE (H): ICD-10-CM

## 2020-04-21 DIAGNOSIS — E55.9 VITAMIN D DEFICIENCY: ICD-10-CM

## 2020-04-21 DIAGNOSIS — Z94.0 HTN, KIDNEY TRANSPLANT RELATED: ICD-10-CM

## 2020-04-21 DIAGNOSIS — D84.9 IMMUNOSUPPRESSION (H): ICD-10-CM

## 2020-04-21 DIAGNOSIS — D63.1 ANEMIA IN STAGE 3 CHRONIC KIDNEY DISEASE (H): ICD-10-CM

## 2020-04-21 DIAGNOSIS — Z94.1 HEART REPLACED BY TRANSPLANT (H): ICD-10-CM

## 2020-04-21 DIAGNOSIS — E83.42 HYPOMAGNESEMIA: ICD-10-CM

## 2020-04-21 DIAGNOSIS — Z94.0 KIDNEY REPLACED BY TRANSPLANT: Primary | ICD-10-CM

## 2020-04-21 DIAGNOSIS — Z48.298 AFTERCARE FOLLOWING ORGAN TRANSPLANT: ICD-10-CM

## 2020-04-21 DIAGNOSIS — I15.1 HTN, KIDNEY TRANSPLANT RELATED: ICD-10-CM

## 2020-04-21 LAB
DONOR IDENTIFICATION: NORMAL
DONOR IDENTIFICATION: NORMAL
DSA COMMENTS: NORMAL
DSA COMMENTS: NORMAL
DSA PRESENT: NO
DSA PRESENT: NO
DSA TEST METHOD: NORMAL
DSA TEST METHOD: NORMAL
ORGAN: NORMAL
ORGAN: NORMAL
SA1 CELL: NORMAL
SA1 COMMENTS: NORMAL
SA1 HI RISK ABY: NORMAL
SA1 MOD RISK ABY: NORMAL
SA1 TEST METHOD: NORMAL
SA2 CELL: NORMAL
SA2 COMMENTS: NORMAL
SA2 HI RISK ABY UA: NORMAL
SA2 MOD RISK ABY: NORMAL
SA2 TEST METHOD: NORMAL
UNACCEPTABLE ANTIGEN: NORMAL
UNOS CPRA: 0

## 2020-04-21 ASSESSMENT — PAIN SCALES - GENERAL: PAINLEVEL: NO PAIN (0)

## 2020-04-21 NOTE — PROGRESS NOTES
"Murray Nicholson is a 65 year old male who is being evaluated via a billable video visit.      The patient has been notified of following:     \"This video visit will be conducted via a call between you and your physician/provider. We have found that certain health care needs can be provided without the need for an in-person physical exam.  This service lets us provide the care you need with a video conversation.  If a prescription is necessary we can send it directly to your pharmacy.  If lab work is needed we can place an order for that and you can then stop by our lab to have the test done at a later time.    Video visits are billed at different rates depending on your insurance coverage.  Please reach out to your insurance provider with any questions.    If during the course of the call the physician/provider feels a video visit is not appropriate, you will not be charged for this service.\"    Patient has given verbal consent for Video visit? Yes    Video Start Time: 1030  Video Stop Time: 1052    How would you like to obtain your AVS? Mail a copy    Patient would like the video invitation sent by: Send to e-mail at: [email or web address]    Chief Complaint   Patient presents with     RECHECK     Follow up Kidney TX     Vital signs:  Temp: 95.4  F (35.2  C)   BP: 120/87 Pulse: 85             Weight: 75.5 kg (166 lb 7.2 oz)  Estimated body mass index is 24.76 kg/m  as calculated from the following:    Height as of 2/24/20: 1.746 m (5' 8.75\").    Weight as of this encounter: 75.5 kg (166 lb 7.2 oz).        Self reported vitals   patient is ok with video call or phone call   Marcie Saul CMA    "

## 2020-04-21 NOTE — LETTER
"4/21/2020      RE: Murray Nicholson  [address]  [place] [address]       Murray Nicholson is a 65 year old male who is being evaluated via a billable video visit.      The patient has been notified of following:     \"This video visit will be conducted via a call between you and your physician/provider. We have found that certain health care needs can be provided without the need for an in-person physical exam.  This service lets us provide the care you need with a video conversation.  If a prescription is necessary we can send it directly to your pharmacy.  If lab work is needed we can place an order for that and you can then stop by our lab to have the test done at a later time.    Video visits are billed at different rates depending on your insurance coverage.  Please reach out to your insurance provider with any questions.    If during the course of the call the physician/provider feels a video visit is not appropriate, you will not be charged for this service.\"    Patient has given verbal consent for Video visit? Yes    Video Start Time: 1030  Video Stop Time: 1052    How would you like to obtain your AVS? Mail a copy    Patient would like the video invitation sent by: Send to e-mail at: [email or web address]    Chief Complaint   Patient presents with     RECHECK     Follow up Kidney TX     Vital signs:  Temp: 95.4  F (35.2  C)   BP: 120/87 Pulse: 85             Weight: 75.5 kg (166 lb 7.2 oz)  Estimated body mass index is 24.76 kg/m  as calculated from the following:    Height as of 2/24/20: 1.746 m (5' 8.75\").    Weight as of this encounter: 75.5 kg (166 lb 7.2 oz).        Self reported vitals   patient is ok with video call or phone call   Marcie Saul CMA      ACUTE TRANSPLANT NEPHROLOGY VISIT    Assessment & Plan   # LDKT: Stable   - Baseline Cr ~ 0.9-1.1   - Proteinuria: Minimal (0.2-0.5 grams)   - Date DSA Last Checked: Jan/2020      Latest DSA: No   - BK Viremia: No   - Kidney Tx Biopsy: " No    # Heart Tx: Appears to be stable with negative cardiac biopsies for rejection and no DSA.  Follows with Cardiology.     # Immunosuppression: Tacrolimus immediate release (goal 8-10) and Mycophenolate mofetil (goal 1.0-3.5)   - Changes: No    # Infection Prophylaxis:   - PJP: Sulfa/TMP (Bactrim)  - CMV: None, prophylaxis completed    # Hypertension: Controlled;  Goal BP: < 130/80   - Changes: No    # Diabetes: Controlled (HbA1c <7%) Last HbA1c: 4.5%   - Management as per primary care.    # Anemia in Chronic Renal Disease: Hgb: Stable      ANASTASIYA: No   - Iron studies: Replete    # Mineral Bone Disorder:   - Secondary renal hyperparathyroidism; PTH level: Minimally elevated ( pg/ml)        On treatment: None and no need to follow.  - Vitamin D; level: Low        On Supplement: Yes  - Calcium; level: Normal        On Supplement: No    # Electrolytes:   - Potassium; level: Normal        On Supplement: No  - Magnesium; level: Stable low        On Supplement: Yes  - Bicarbonate; level: Normal        On Supplement: Yes    # PAD: Asymptomatic with no claudication symptoms.    # MGUS: Patient had a monoclonal peak of 0.4 with a kappa light chain.  Recommend repeating kappa/lambda light chain and SPEP.     # GERD: Asymptomatic and has been tapering down on medication.  Can stop pantoprazole and will continue on famotidine.    # SHEELA on CPAP: Patient is compliant.      # Medical Compliance: Yes    # Transplant History:  Etiology of Kidney Failure: Diabetes mellitus type 2  Tx: LDKT and Heart Tx  Transplant: 12/19/2019 (Kidney), 6/14/2018 (Heart)  Donor Type: Living Donor Class:   Crossmatch at time of Tx: negative  DSA at time of Tx: No  Significant changes in immunosuppression: None  CMV IgG Ab High Risk Discordance (D+/R-): No  EBV IgG Ab High Risk Discordance (D+/R-): No  Significant transplant-related complications: None    Transplant Office Phone Number: [phone]    Assessment and plan was discussed with the  "patient and he voiced his understanding and agreement.    Return visit: Return for 6 month post transplant visit.    Giovany Quijano MD    Chief Complaint   Mr. Nicholson is a 65 year old here for kidney transplant and immunosuppression management.     History of Present Illness    Mr. Nicholson reports feeling very good overall with some medical complaints.  Since last clinic visit, patient reports no hospitalizations or new medical complaints and has been doing well overall.  His energy level is \"great\" and pretty much normal.  He is active and getting regular exercise.  Denies any chest pain or shortness of breath with exertion.  Appetite is good as he feels hungry a lot and his weight is up a few pounds, although he feels a lot of this is a gain in muscle mass.  No nausea, vomiting or diarrhea.  No heartburn symptoms.  No fever, sweats or chills.  No leg swelling.    Recent Hospitalizations:  [x] No [] Yes    New Medical Issues: [x] No [] Yes    Decreased energy: [x] No [] Yes    Chest pain or SOB with exertion:  [x] No [] Yes    Appetite change or weight change: [x] No [] Yes    Nausea, vomiting or diarrhea:  [x] No [] Yes    Fever, sweats or chills: [x] No [] Yes    Leg swelling: [x] No [] Yes      Home BP: 110-120/80s    Review of Systems   A comprehensive review of systems was obtained and negative, except as noted in the HPI or PMH.    Problem List   Patient Active Problem List   Diagnosis     Esophageal reflux     Allergic rhinitis     Anemia in chronic renal disease     Coronary artery disease involving native artery of transplanted heart without angina pectoris     Dyslipidemia     MGUS (monoclonal gammopathy of unknown significance)     Ascending aortic aneurysm (H)     Leukopenia     Sigmoid diverticulitis     Heart replaced by transplant (H)     Aortic stenosis     Status post coronary angiogram     Immunosuppression (H)     Type 2 diabetes mellitus (H)     Pancreatic cyst     Kidney replaced by " transplant     Aftercare following organ transplant     HTN, kidney transplant related     Secondary renal hyperparathyroidism (H)     Vitamin D deficiency     Hypomagnesemia     SHEELA on CPAP       Social History   Social History     Tobacco Use     Smoking status: Former Smoker     Packs/day: 1.00     Years: 20.00     Pack years: 20.00     Types: Cigarettes     Start date: 1984     Last attempt to quit: 1994     Years since quittin.6     Smokeless tobacco: Never Used   Substance Use Topics     Alcohol use: No     Alcohol/week: 0.0 standard drinks     Drug use: No       Allergies   Allergies   Allergen Reactions     Norco [Hydrocodone-Acetaminophen] Nausea and Vomiting     Cats      Throat tightness     Isosorbide Other (See Comments)     hypotension     Penicillins Hives     Seasonal Allergies      rhinitis     Shrimp      Throat closes        Medications   Current Outpatient Medications   Medication Sig     acetaminophen (TYLENOL) 325 MG tablet Take 2 tablets (650 mg) by mouth every 4 hours as needed for other (multimodal surgical pain management along with NSAIDS and opioid medication as indicated based on pain control and physical function.)     aspirin 81 MG EC tablet Take 81 mg by mouth daily     atorvastatin (LIPITOR) 40 MG tablet Take 1 tablet (40 mg) by mouth daily     blood glucose (ACCU-CHEK GUIDE) test strip Use to test blood sugar 2 times daily or as directed.     blood glucose monitoring (ACCU-CHEK FASTCLIX) lancets Use to test blood sugar 2 times daily or as directed.     famotidine (PEPCID) 20 MG tablet Take 1 tablet (20 mg) by mouth 2 times daily     fluticasone (FLONASE) 50 MCG/ACT nasal spray Spray 1 spray into both nostrils daily (Patient taking differently: Spray 1 spray into both nostrils daily as needed )     insulin glargine (LANTUS PEN) 100 UNIT/ML pen Inject 10 Units Subcutaneous every morning Or as directed     magnesium oxide (MAG-OX) 400 MG tablet Take 2 tablets (800 mg) by  mouth daily (with lunch)     melatonin 1 MG TABS tablet Take 2 tablets (2 mg) by mouth nightly as needed for sleep     metFORMIN (GLUCOPHAGE-XR) 500 MG 24 hr tablet Take 1 tablet (500 mg) by mouth daily (with breakfast) For 1 week, then increase to 2 tablets once daily thereafter.     Multiple Vitamin (DAILY-DONY) TABS TAKE 1 TABLET BY MOUTH DAILY     mycophenolate (GENERIC EQUIVALENT) 250 MG capsule Take 3 capsules (750 mg) by mouth 2 times daily     sodium bicarbonate 650 MG tablet Take 2 tablets (1,300 mg) by mouth daily     sulfamethoxazole-trimethoprim (BACTRIM/SEPTRA) 400-80 MG tablet Take 1 tablet by mouth daily     tacrolimus (GENERIC EQUIVALENT) 0.5 MG capsule HOLD for dose change.     tacrolimus (GENERIC EQUIVALENT) 1 MG capsule Take 2 capsules (2 mg) by mouth 2 times daily Total dose = 2.mg BID     Vitamin D3 (CHOLECALCIFEROL) 25 mcg (1000 units) tablet Take 1 tablet (25 mcg) by mouth daily     No current facility-administered medications for this visit.      Facility-Administered Medications Ordered in Other Visits   Medication     diatrizoate meglumine-sodium (GASTROGRAFIN/GASTROVIEW) 66-10 % solution 480 mL     Medications Discontinued During This Encounter   Medication Reason     pantoprazole (PROTONIX) 20 MG EC tablet        Data     Renal Latest Ref Rng & Units 4/20/2020 4/13/2020 4/6/2020   Na 133 - 144 mmol/L 137 138 138   K 3.4 - 5.3 mmol/L 4.4 4.0 3.9   Cl 94 - 109 mmol/L 107 108 108   CO2 20 - 32 mmol/L 26 22 22   BUN 7 - 30 mg/dL 20 20 17   Cr 0.66 - 1.25 mg/dL 1.07 0.97 0.97   Glucose 70 - 99 mg/dL 176(H) 151(H) 182(H)   Ca  8.5 - 10.1 mg/dL 9.1 9.0 8.6   Mg 1.6 - 2.3 mg/dL - - 1.5(L)     Bone Health Latest Ref Rng & Units 4/13/2020 4/6/2020 3/2/2020   Phos 2.5 - 4.5 mg/dL - 3.5 4.0   PTHi 18 - 80 pg/mL 149(H) - -   Vit D Def 20 - 75 ug/L - - -     Heme Latest Ref Rng & Units 4/20/2020 4/13/2020 4/6/2020   WBC 4.0 - 11.0 10e9/L 2.8(L) 3.5(L) 3.0(L)   Hgb 13.3 - 17.7 g/dL 11.9(L) 12.4(L)  12.1(L)   Plt 150 - 450 10e9/L 164 182 171     Liver Latest Ref Rng & Units 12/24/2019 12/10/2019 10/28/2019   AP 40 - 150 U/L 278(H) 372(H) 318(H)   TBili 0.2 - 1.3 mg/dL 1.1 1.0 0.7   DBili 0.0 - 0.2 mg/dL 0.2 - -   ALT 0 - 70 U/L 18 16 14   AST 0 - 45 U/L 15 11 11   Tot Protein 6.8 - 8.8 g/dL 7.1 7.6 7.6   Albumin 3.4 - 5.0 g/dL 3.8 4.0 3.8     Pancreas Latest Ref Rng & Units 12/24/2019 12/20/2019 12/10/2019   A1C 0 - 5.6 % 4.5 4.3 5.2   Amylase 30 - 110 U/L - - -     Iron studies Latest Ref Rng & Units 12/10/2019 6/10/2019 7/10/2018   Iron 35 - 180 ug/dL 84 118 66   Iron sat 15 - 46 % 35 47(H) 28   Ferritin 26 - 388 ng/mL 445(H) 644(H) 771(H)     [facility] Txp Virology Latest Ref Rng & Units 4/6/2020 3/2/2020 2/20/2020   CVM DNA Quant - - - -   BK Spec - Plasma Plasma, EDTA anticoagulant Plasma   BK Res BKNEG:BK Virus DNA Not Detected copies/mL BK Virus DNA Not Detected BK Virus DNA Not Detected BK Virus DNA Not Detected   BK Log <2.7 Log copies/mL Not Calculated Not Calculated Not Calculated   Hep B Core NR:Nonreactive - - -        Recent Labs   Lab Test 04/06/20  0933 04/13/20  0931 04/20/20  0942   DOSTAC 0920pm 4/12/20 2000 4/19/20 2152   TACROL 8.8 8.5 9.2     Recent Labs   Lab Test 12/26/19  0720 01/27/20  0918 02/03/20  0948   DOSMPA Not Provided NTP 02/02/20 0915pm   MPACID 1.65 2.39 2.41   MPAG 58.9 54.6 50.4       Giovany Quijano MD

## 2020-04-21 NOTE — PROGRESS NOTES
ACUTE TRANSPLANT NEPHROLOGY VISIT    Assessment & Plan   # LDKT: Stable   - Baseline Cr ~ 0.9-1.1   - Proteinuria: Minimal (0.2-0.5 grams)   - Date DSA Last Checked: Jan/2020      Latest DSA: No   - BK Viremia: No   - Kidney Tx Biopsy: No    # Heart Tx: Appears to be stable with negative cardiac biopsies for rejection and no DSA.  Follows with Cardiology.     # Immunosuppression: Tacrolimus immediate release (goal 8-10) and Mycophenolate mofetil (goal 1.0-3.5)   - Changes: No    # Infection Prophylaxis:   - PJP: Sulfa/TMP (Bactrim)  - CMV: None, prophylaxis completed    # Hypertension: Controlled;  Goal BP: < 130/80   - Changes: No    # Diabetes: Controlled (HbA1c <7%) Last HbA1c: 4.5%   - Management as per primary care.    # Anemia in Chronic Renal Disease: Hgb: Stable      ANASTASIYA: No   - Iron studies: Replete    # Mineral Bone Disorder:   - Secondary renal hyperparathyroidism; PTH level: Minimally elevated ( pg/ml)        On treatment: None and no need to follow.  - Vitamin D; level: Low        On Supplement: Yes  - Calcium; level: Normal        On Supplement: No    # Electrolytes:   - Potassium; level: Normal        On Supplement: No  - Magnesium; level: Stable low        On Supplement: Yes  - Bicarbonate; level: Normal        On Supplement: Yes    # PAD: Asymptomatic with no claudication symptoms.    # MGUS: Patient had a monoclonal peak of 0.4 with a kappa light chain.  Recommend repeating kappa/lambda light chain and SPEP.     # GERD: Asymptomatic and has been tapering down on medication.  Can stop pantoprazole and will continue on famotidine.    # SHEELA on CPAP: Patient is compliant.      # Medical Compliance: Yes    # Transplant History:  Etiology of Kidney Failure: Diabetes mellitus type 2  Tx: LDKT and Heart Tx  Transplant: 12/19/2019 (Kidney), 6/14/2018 (Heart)  Donor Type: Living Donor Class:   Crossmatch at time of Tx: negative  DSA at time of Tx: No  Significant changes in immunosuppression: None  CMV  "IgG Ab High Risk Discordance (D+/R-): No  EBV IgG Ab High Risk Discordance (D+/R-): No  Significant transplant-related complications: None    Transplant Office Phone Number: [phone]    Assessment and plan was discussed with the patient and he voiced his understanding and agreement.    Return visit: Return for 6 month post transplant visit.    Giovany Quijano MD    Chief Complaint   Mr. Nicholson is a 65 year old here for kidney transplant and immunosuppression management.     History of Present Illness    Mr. Nicholson reports feeling very good overall with some medical complaints.  Since last clinic visit, patient reports no hospitalizations or new medical complaints and has been doing well overall.  His energy level is \"great\" and pretty much normal.  He is active and getting regular exercise.  Denies any chest pain or shortness of breath with exertion.  Appetite is good as he feels hungry a lot and his weight is up a few pounds, although he feels a lot of this is a gain in muscle mass.  No nausea, vomiting or diarrhea.  No heartburn symptoms.  No fever, sweats or chills.  No leg swelling.    Recent Hospitalizations:  [x] No [] Yes    New Medical Issues: [x] No [] Yes    Decreased energy: [x] No [] Yes    Chest pain or SOB with exertion:  [x] No [] Yes    Appetite change or weight change: [x] No [] Yes    Nausea, vomiting or diarrhea:  [x] No [] Yes    Fever, sweats or chills: [x] No [] Yes    Leg swelling: [x] No [] Yes      Home BP: 110-120/80s    Review of Systems   A comprehensive review of systems was obtained and negative, except as noted in the HPI or PMH.    Problem List   Patient Active Problem List   Diagnosis     Esophageal reflux     Allergic rhinitis     Anemia in chronic renal disease     Coronary artery disease involving native artery of transplanted heart without angina pectoris     Dyslipidemia     MGUS (monoclonal gammopathy of unknown significance)     Ascending aortic aneurysm (H)     " Leukopenia     Sigmoid diverticulitis     Heart replaced by transplant (H)     Aortic stenosis     Status post coronary angiogram     Immunosuppression (H)     Type 2 diabetes mellitus (H)     Pancreatic cyst     Kidney replaced by transplant     Aftercare following organ transplant     HTN, kidney transplant related     Secondary renal hyperparathyroidism (H)     Vitamin D deficiency     Hypomagnesemia     SHEELA on CPAP       Social History   Social History     Tobacco Use     Smoking status: Former Smoker     Packs/day: 1.00     Years: 20.00     Pack years: 20.00     Types: Cigarettes     Start date: 1984     Last attempt to quit: 1994     Years since quittin.6     Smokeless tobacco: Never Used   Substance Use Topics     Alcohol use: No     Alcohol/week: 0.0 standard drinks     Drug use: No       Allergies   Allergies   Allergen Reactions     Norco [Hydrocodone-Acetaminophen] Nausea and Vomiting     Cats      Throat tightness     Isosorbide Other (See Comments)     hypotension     Penicillins Hives     Seasonal Allergies      rhinitis     Shrimp      Throat closes        Medications   Current Outpatient Medications   Medication Sig     acetaminophen (TYLENOL) 325 MG tablet Take 2 tablets (650 mg) by mouth every 4 hours as needed for other (multimodal surgical pain management along with NSAIDS and opioid medication as indicated based on pain control and physical function.)     aspirin 81 MG EC tablet Take 81 mg by mouth daily     atorvastatin (LIPITOR) 40 MG tablet Take 1 tablet (40 mg) by mouth daily     blood glucose (ACCU-CHEK GUIDE) test strip Use to test blood sugar 2 times daily or as directed.     blood glucose monitoring (ACCU-CHEK FASTCLIX) lancets Use to test blood sugar 2 times daily or as directed.     famotidine (PEPCID) 20 MG tablet Take 1 tablet (20 mg) by mouth 2 times daily     fluticasone (FLONASE) 50 MCG/ACT nasal spray Spray 1 spray into both nostrils daily (Patient taking  differently: Spray 1 spray into both nostrils daily as needed )     insulin glargine (LANTUS PEN) 100 UNIT/ML pen Inject 10 Units Subcutaneous every morning Or as directed     magnesium oxide (MAG-OX) 400 MG tablet Take 2 tablets (800 mg) by mouth daily (with lunch)     melatonin 1 MG TABS tablet Take 2 tablets (2 mg) by mouth nightly as needed for sleep     metFORMIN (GLUCOPHAGE-XR) 500 MG 24 hr tablet Take 1 tablet (500 mg) by mouth daily (with breakfast) For 1 week, then increase to 2 tablets once daily thereafter.     Multiple Vitamin (DAILY-DONY) TABS TAKE 1 TABLET BY MOUTH DAILY     mycophenolate (GENERIC EQUIVALENT) 250 MG capsule Take 3 capsules (750 mg) by mouth 2 times daily     sodium bicarbonate 650 MG tablet Take 2 tablets (1,300 mg) by mouth daily     sulfamethoxazole-trimethoprim (BACTRIM/SEPTRA) 400-80 MG tablet Take 1 tablet by mouth daily     tacrolimus (GENERIC EQUIVALENT) 0.5 MG capsule HOLD for dose change.     tacrolimus (GENERIC EQUIVALENT) 1 MG capsule Take 2 capsules (2 mg) by mouth 2 times daily Total dose = 2.mg BID     Vitamin D3 (CHOLECALCIFEROL) 25 mcg (1000 units) tablet Take 1 tablet (25 mcg) by mouth daily     No current facility-administered medications for this visit.      Facility-Administered Medications Ordered in Other Visits   Medication     diatrizoate meglumine-sodium (GASTROGRAFIN/GASTROVIEW) 66-10 % solution 480 mL     Medications Discontinued During This Encounter   Medication Reason     pantoprazole (PROTONIX) 20 MG EC tablet        Data     Renal Latest Ref Rng & Units 4/20/2020 4/13/2020 4/6/2020   Na 133 - 144 mmol/L 137 138 138   K 3.4 - 5.3 mmol/L 4.4 4.0 3.9   Cl 94 - 109 mmol/L 107 108 108   CO2 20 - 32 mmol/L 26 22 22   BUN 7 - 30 mg/dL 20 20 17   Cr 0.66 - 1.25 mg/dL 1.07 0.97 0.97   Glucose 70 - 99 mg/dL 176(H) 151(H) 182(H)   Ca  8.5 - 10.1 mg/dL 9.1 9.0 8.6   Mg 1.6 - 2.3 mg/dL - - 1.5(L)     Bone Health Latest Ref Rng & Units 4/13/2020 4/6/2020 3/2/2020    Phos 2.5 - 4.5 mg/dL - 3.5 4.0   PTHi 18 - 80 pg/mL 149(H) - -   Vit D Def 20 - 75 ug/L - - -     Heme Latest Ref Rng & Units 4/20/2020 4/13/2020 4/6/2020   WBC 4.0 - 11.0 10e9/L 2.8(L) 3.5(L) 3.0(L)   Hgb 13.3 - 17.7 g/dL 11.9(L) 12.4(L) 12.1(L)   Plt 150 - 450 10e9/L 164 182 171     Liver Latest Ref Rng & Units 12/24/2019 12/10/2019 10/28/2019   AP 40 - 150 U/L 278(H) 372(H) 318(H)   TBili 0.2 - 1.3 mg/dL 1.1 1.0 0.7   DBili 0.0 - 0.2 mg/dL 0.2 - -   ALT 0 - 70 U/L 18 16 14   AST 0 - 45 U/L 15 11 11   Tot Protein 6.8 - 8.8 g/dL 7.1 7.6 7.6   Albumin 3.4 - 5.0 g/dL 3.8 4.0 3.8     Pancreas Latest Ref Rng & Units 12/24/2019 12/20/2019 12/10/2019   A1C 0 - 5.6 % 4.5 4.3 5.2   Amylase 30 - 110 U/L - - -     Iron studies Latest Ref Rng & Units 12/10/2019 6/10/2019 7/10/2018   Iron 35 - 180 ug/dL 84 118 66   Iron sat 15 - 46 % 35 47(H) 28   Ferritin 26 - 388 ng/mL 445(H) 644(H) 771(H)     [facility] Txp Virology Latest Ref Rng & Units 4/6/2020 3/2/2020 2/20/2020   CVM DNA Quant - - - -   BK Spec - Plasma Plasma, EDTA anticoagulant Plasma   BK Res BKNEG:BK Virus DNA Not Detected copies/mL BK Virus DNA Not Detected BK Virus DNA Not Detected BK Virus DNA Not Detected   BK Log <2.7 Log copies/mL Not Calculated Not Calculated Not Calculated   Hep B Core NR:Nonreactive - - -        Recent Labs   Lab Test 04/06/20  0933 04/13/20  0931 04/20/20  0942   DOSTAC 0920pm 4/12/20 2000 4/19/20 2152   TACROL 8.8 8.5 9.2     Recent Labs   Lab Test 12/26/19  0720 01/27/20  0918 02/03/20  0948   DOSMPA Not Provided NTP 02/02/20 0915pm   MPACID 1.65 2.39 2.41   MPAG 58.9 54.6 50.4

## 2020-04-22 ENCOUNTER — TELEPHONE (OUTPATIENT)
Dept: TRANSPLANT | Facility: CLINIC | Age: 65
End: 2020-04-22

## 2020-04-22 DIAGNOSIS — Z94.0 KIDNEY REPLACED BY TRANSPLANT: Primary | ICD-10-CM

## 2020-04-22 NOTE — TELEPHONE ENCOUNTER
Giovany Quijano MD Schindelholz, Alanna, RN               Please check serum kappa/lambda light chains and SPEP with next labs.          Orders entered.

## 2020-04-23 DIAGNOSIS — Z94.0 KIDNEY REPLACED BY TRANSPLANT: ICD-10-CM

## 2020-04-23 RX ORDER — FLURBIPROFEN SODIUM 0.3 MG/ML
SOLUTION/ DROPS OPHTHALMIC
Qty: 300 EACH | Refills: 3 | Status: SHIPPED | OUTPATIENT
Start: 2020-04-23 | End: 2020-08-11

## 2020-04-27 ENCOUNTER — TELEPHONE (OUTPATIENT)
Dept: TRANSPLANT | Facility: CLINIC | Age: 65
End: 2020-04-27

## 2020-04-27 DIAGNOSIS — Z94.0 KIDNEY TRANSPLANTED: Primary | ICD-10-CM

## 2020-04-27 NOTE — TELEPHONE ENCOUNTER
Patient Call: General    Reason for call: patient has some questions regarding labs and lab days.    Call back needed? Yes    Return Call Needed  Same as documented in contacts section  When to return call?: Same day: Route High Priority

## 2020-04-27 NOTE — TELEPHONE ENCOUNTER
Spoke to patient regarding [facility] facility, sending message to schedulers to reach out to patient to schedule future lab appt.

## 2020-04-27 NOTE — TELEPHONE ENCOUNTER
ISSUE:  [facility] received phone call from Rolando Crawley CaseReader, who reports Allosure drawn 4/23/2020 was not acceptable due to hemolysis.     PLAN:  Reorder test to be drawn with next lab draw.

## 2020-04-28 LAB — ALLOSURE DD-CFDNA: NORMAL

## 2020-05-04 DIAGNOSIS — Z94.0 KIDNEY REPLACED BY TRANSPLANT: ICD-10-CM

## 2020-05-04 DIAGNOSIS — Z94.0 KIDNEY TRANSPLANTED: ICD-10-CM

## 2020-05-04 DIAGNOSIS — Z79.899 LONG TERM USE OF DRUG: ICD-10-CM

## 2020-05-04 LAB
ANION GAP SERPL CALCULATED.3IONS-SCNC: 7 MMOL/L (ref 3–14)
BUN SERPL-MCNC: 19 MG/DL (ref 7–30)
CALCIUM SERPL-MCNC: 9.3 MG/DL (ref 8.5–10.1)
CHLORIDE SERPL-SCNC: 107 MMOL/L (ref 94–109)
CO2 SERPL-SCNC: 22 MMOL/L (ref 20–32)
CREAT SERPL-MCNC: 1.02 MG/DL (ref 0.66–1.25)
ERYTHROCYTE [DISTWIDTH] IN BLOOD BY AUTOMATED COUNT: 12.5 % (ref 10–15)
GFR SERPL CREATININE-BSD FRML MDRD: 77 ML/MIN/{1.73_M2}
GLUCOSE SERPL-MCNC: 163 MG/DL (ref 70–99)
HCT VFR BLD AUTO: 37.5 % (ref 40–53)
HGB BLD-MCNC: 12.8 G/DL (ref 13.3–17.7)
KAPPA LC UR-MCNC: 1.8 MG/DL (ref 0.33–1.94)
KAPPA LC/LAMBDA SER: 1.26 {RATIO} (ref 0.26–1.65)
LAMBDA LC SERPL-MCNC: 1.43 MG/DL (ref 0.57–2.63)
MAGNESIUM SERPL-MCNC: 1.5 MG/DL (ref 1.6–2.3)
MCH RBC QN AUTO: 34 PG (ref 26.5–33)
MCHC RBC AUTO-ENTMCNC: 34.1 G/DL (ref 31.5–36.5)
MCV RBC AUTO: 100 FL (ref 78–100)
PHOSPHATE SERPL-MCNC: 3.7 MG/DL (ref 2.5–4.5)
PLATELET # BLD AUTO: 197 10E9/L (ref 150–450)
POTASSIUM SERPL-SCNC: 4.1 MMOL/L (ref 3.4–5.3)
RBC # BLD AUTO: 3.76 10E12/L (ref 4.4–5.9)
SODIUM SERPL-SCNC: 136 MMOL/L (ref 133–144)
TACROLIMUS BLD-MCNC: 12.8 UG/L (ref 5–15)
TME LAST DOSE: NORMAL H
WBC # BLD AUTO: 3.2 10E9/L (ref 4–11)

## 2020-05-04 PROCEDURE — 87799 DETECT AGENT NOS DNA QUANT: CPT | Performed by: INTERNAL MEDICINE

## 2020-05-04 PROCEDURE — 83883 ASSAY NEPHELOMETRY NOT SPEC: CPT | Performed by: INTERNAL MEDICINE

## 2020-05-04 PROCEDURE — 80197 ASSAY OF TACROLIMUS: CPT | Performed by: INTERNAL MEDICINE

## 2020-05-04 PROCEDURE — 84165 PROTEIN E-PHORESIS SERUM: CPT | Performed by: INTERNAL MEDICINE

## 2020-05-04 PROCEDURE — 00000402 ZZHCL STATISTIC TOTAL PROTEIN: Performed by: INTERNAL MEDICINE
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
## 2020-05-05 ENCOUNTER — TELEPHONE (OUTPATIENT)
Dept: TRANSPLANT | Facility: CLINIC | Age: 65
End: 2020-05-05

## 2020-05-05 DIAGNOSIS — Z94.0 KIDNEY REPLACED BY TRANSPLANT: Primary | ICD-10-CM

## 2020-05-05 LAB
ALBUMIN SERPL ELPH-MCNC: 4.3 G/DL (ref 3.7–5.1)
ALPHA1 GLOB SERPL ELPH-MCNC: 0.3 G/DL (ref 0.2–0.4)
ALPHA2 GLOB SERPL ELPH-MCNC: 0.7 G/DL (ref 0.5–0.9)
B-GLOBULIN SERPL ELPH-MCNC: 0.6 G/DL (ref 0.6–1)
GAMMA GLOB SERPL ELPH-MCNC: 1.3 G/DL (ref 0.7–1.6)
M PROTEIN SERPL ELPH-MCNC: 0.5 G/DL
PROT PATTERN SERPL ELPH-IMP: ABNORMAL

## 2020-05-05 NOTE — TELEPHONE ENCOUNTER
ISSUE:   Tacrolimus IR level 12.8 on 5/4, goal 8-10, dose 2 mg BID.    PLAN:   Please call patient and confirm this was an accurate 12-hour trough. Verify Tacrolimus IR dose 2 mg BID. Confirm no new medications or illness.   Confirm no signs or symptoms of tacrolimus toxicity (tremors, headaches, high BP).    If accurate trough and accurate dose, stay on the same dose Tacrolimus IR dose and repeat labs as scheduled.      **notify [facility] if any s/s of tacro toxicity.    Jennifer Villarreal RN, BSN, [facility]  Transplant Care Coordinator      OUTCOME:   Spoke with patient, they confirm accurate trough level and current dose 2 mg BID. Patient confirmed no dose change at this time and to repeat labs in 2 weeks. Orders sent to preferred pharmacy for dose change and lab for repeat labs. Patient voiced understanding of plan.

## 2020-05-06 ENCOUNTER — TELEPHONE (OUTPATIENT)
Dept: TRANSPLANT | Facility: CLINIC | Age: 65
End: 2020-05-06

## 2020-05-06 NOTE — TELEPHONE ENCOUNTER
Post Kidney and Pancreas Transplant Team Conference  Date: 5/6/2020  Transplant Coordinator: Lillie Ulloa, Cami Villarreal     Attendees:  [x]  Dr. Quijano  [x] Patt Johns LPN     []  Dr. Ulloa [x] Edith Ward, TONY [] Donna Wheat LPN   [x]  Dr. Escobar [] Marielena Romero, RN     [] Radha Gray RN [x] Eduardo Lea, EllisD   [x] Dr. Ayala [x] Jennifer Villarreal, TONY    [x] Dr. Cheney [] Fantasma Dorsey RN    [] Dr. Chase [] Autumn Mendez RN    [] Dr. Cowan [] Kari Carr RN     [] Anastasia Tanner, TONY    [] Surgery Fellow [x] Alma Rosa Monique RN    [] Maliha Jesus NP [] Scarlet Haines RN        Verbal Plan Read Back:   OK to have [facility] completed with routine schedule    Routed to RN Coordinator   Patt Johns LPN

## 2020-05-07 LAB — ALLOSURE DD-CFDNA: 0.23 %

## 2020-05-08 ENCOUNTER — ALLIED HEALTH/NURSE VISIT (OUTPATIENT)
Dept: PHARMACY | Facility: CLINIC | Age: 65
End: 2020-05-08

## 2020-05-08 DIAGNOSIS — Z94.0 KIDNEY REPLACED BY TRANSPLANT: Primary | ICD-10-CM

## 2020-05-08 DIAGNOSIS — E11.29 TYPE 2 DIABETES MELLITUS WITH OTHER DIABETIC KIDNEY COMPLICATION, WITHOUT LONG-TERM CURRENT USE OF INSULIN (H): ICD-10-CM

## 2020-05-08 DIAGNOSIS — Z94.1 HEART TRANSPLANTED (H): ICD-10-CM

## 2020-05-08 DIAGNOSIS — K21.9 GASTROESOPHAGEAL REFLUX DISEASE WITHOUT ESOPHAGITIS: ICD-10-CM

## 2020-05-08 PROCEDURE — 99607 MTMS BY PHARM ADDL 15 MIN: CPT | Performed by: PHARMACIST

## 2020-05-08 PROCEDURE — 99606 MTMS BY PHARM EST 15 MIN: CPT | Performed by: PHARMACIST

## 2020-05-08 RX ORDER — METFORMIN HCL 500 MG
2000 TABLET, EXTENDED RELEASE 24 HR ORAL
Qty: 360 TABLET | Refills: 1 | Status: SHIPPED | OUTPATIENT
Start: 2020-05-08 | End: 2020-12-13

## 2020-05-08 NOTE — PROGRESS NOTES
SUBJECTIVE/OBJECTIVE:                Murray Nicholson is a 64 year old male coming-in for a Follow-up MTM visit.  He was referred post txp.     Patient consented to a telehealth visit: yes  Telemedicine Visit Details  Type of service:  Telephone visit  Start Time: 11:34 AM  End Time: 11:55 AM  Originating Location (pt. Location): Home  Distant Location (provider location):  [facility] MEDICATION THERAPY MANAGEMENT  Mode of Communication:  Telephone    Chief Complaint: diabetes follow-up.       Allergies/ADRs: Reviewed in Epic  Tobacco:  reports that he quit smoking about 25 years ago. His smoking use included cigarettes. He started smoking about 36 years ago. He has a 20.00 pack-year smoking history. He has never used smokeless tobacco.  Alcohol: not currently using  Caffeine: 0-1 cups/day of coffee, 1 cups/day of tea  PMH: Reviewed in Epic    Medication Adherence/Access:  Patient uses pill box(es).  Patient takes medications 4 time(s) per day. 9am and 9pm  Per patient, misses medication 0 times per week.   Medication barriers: none. Insulin is enpensive.   The patient fills medications at [place]: NO, fills medications at [facility].    Renal/ post heart Transplant:  Current immunosuppressants include Tacrolimus 2 mg  BID (0-6 months post tx, goal 8-10) and MMF 750mg BID (goal 0-6 month post tx: 1-3.5).  Pt reports no side effects  Transplant date: 12/19/19 renal, heart txp in 6/14/2018  Estimated Creatinine Clearance: 77.1 mL/min (based on SCr of 1.02 mg/dL).  PCP prophylaxis: Bactrim S S every other day  Current supplements for electrolyte replacement: Mag Oxide 800mg daily ( 2 hours from MMF), sodium bicarbonate 1300 mg daily.  Magnesium   Date Value Ref Range Status   05/04/2020 1.5 (L) 1.6 - 2.3 mg/dL Final   Tx Coordinator: Jennifer Villarreal Tx MD: Dr. Quijano, Using Med Card: Yes  Immunizations: annual flu shot 2019; [id number]:  2005, 2011; Prevnar 13: 2015; TDaP:  2013; Shingrix: Zostavax  2015    Diabetes:  Pt currently taking Metformin ER 1000mg daily, Lantus 5 units daily.  Pt is not experiencing side effects.  SMBG: one to two times daily.   Ranges (patient reported):   Date FBG/ 2hours post Lunch/2hours post   5/8 132    5/7 130    5/6 120 150   5/5 124 128   5/4 112    5/3 127    5/2 116      Date FBG/ 2hours post Lunch/2hours post Dinner /2hours post   4/17 119     4/16 133  122   4/15 120  154   4/14 116 149    4/13 115  175   4/12 122 101    4/11 125  166   4/10 127     4/9  118    Patient is not experiencing hypoglycemia  Recent symptoms of high blood sugar? None  Lab Results   Component Value Date    A1C 4.5 12/24/2019    A1C 4.3 12/20/2019    A1C 5.2 12/10/2019    A1C 5.6 09/23/2019    A1C 5.2 06/10/2019     GERD: Current medications include: stopped Pantoprazole on May 1st,  Famotidine 20mg twice daily. Pt c/o mild heartburn in the evening, started taking Pantoprazol 20mg every other day.  Patient feels that current regimen is effective.    Today's Vitals: There were no vitals taken for this visit.    ASSESSMENT:                 Medication Adherence: good, no issues today.    Renal/ post heart Transplant:  Pt is taking incorrect dose of Bactrim, should be every day, not every other day.     Diabetes: Patient's FBG are at goal of , we have halved his insulin, will continue increasing Metformin and Tapering Lantus.     GERD: Pt to continue taking Pantoprazole every other day for a couple more weeks, then stop.     PLAN:                Pt to...  1. Continue Pantoprazole 20mg every other day for a couple more weeks, then stop  2. Increase Metformin ER to 3 tablets daily for 1 week, then 4 tablets daily thereafter.   3. Stop Lantus when you reach max dose of Metformin.   4. Increase Bactrim to every day as directed by transplant team.     I spent 20 minutes with this patient today. I offer these suggestions for consideration by txp team and CPA with PCP. A copy of the visit note was  provided to the patient's referring provider.    Will follow up in 3 weeks.    The patient was given a summary of these recommendations as an after visit summary.    Eduardo Lea, PharmD  [facility] Pharmacist    Phone: [phone]

## 2020-05-08 NOTE — PATIENT INSTRUCTIONS
Recommendations from today's MTM visit:                                                      1. Continue Pantoprazole 20mg every other day for a couple more weeks, then stop    2. Increase Metformin ER to 3 tablets daily for 1 week, then 4 tablets daily thereafter.     3. Stop Lantus when you reach max dose of Metformin.     4. For a tighter blood sugar goal we can shoot for , but as a looser goal we would want you at least less than 150.    5. Increase Bactrim (Sulfa/Trimetho 400/80mg) to 1 tablet every day.     It was great to speak with you today.  I value your experience and would be very thankful for your time with providing feedback on our clinic survey. You may receive a survey via email or text message in the next few days.     Next MTM visit: 6/2 11AM    To schedule another MTM appointment, please call the clinic directly or you may call the MTM scheduling line at [phone] or toll-free at [phone].     My Clinical Pharmacist's contact information:                                                      It was a pleasure talking with you today!  Please feel free to contact me with any questions or concerns you have.      Eduardo Lea, PharmD  [facility] Pharmacist    Phone: [phone]

## 2020-05-18 DIAGNOSIS — Z94.0 KIDNEY REPLACED BY TRANSPLANT: Primary | ICD-10-CM

## 2020-05-18 DIAGNOSIS — Z94.0 KIDNEY REPLACED BY TRANSPLANT: ICD-10-CM

## 2020-05-18 DIAGNOSIS — Z79.899 LONG TERM USE OF DRUG: ICD-10-CM

## 2020-05-18 DIAGNOSIS — E87.20 METABOLIC ACIDOSIS: ICD-10-CM

## 2020-05-18 LAB
ANION GAP SERPL CALCULATED.3IONS-SCNC: 8 MMOL/L (ref 3–14)
BUN SERPL-MCNC: 22 MG/DL (ref 7–30)
CALCIUM SERPL-MCNC: 9.6 MG/DL (ref 8.5–10.1)
CHLORIDE SERPL-SCNC: 106 MMOL/L (ref 94–109)
CO2 SERPL-SCNC: 23 MMOL/L (ref 20–32)
CREAT SERPL-MCNC: 1.24 MG/DL (ref 0.66–1.25)
ERYTHROCYTE [DISTWIDTH] IN BLOOD BY AUTOMATED COUNT: 12.4 % (ref 10–15)
GFR SERPL CREATININE-BSD FRML MDRD: 60 ML/MIN/{1.73_M2}
GLUCOSE SERPL-MCNC: 139 MG/DL (ref 70–99)
HCT VFR BLD AUTO: 38.2 % (ref 40–53)
HGB BLD-MCNC: 12.9 G/DL (ref 13.3–17.7)
MCH RBC QN AUTO: 33.5 PG (ref 26.5–33)
MCHC RBC AUTO-ENTMCNC: 33.8 G/DL (ref 31.5–36.5)
MCV RBC AUTO: 99 FL (ref 78–100)
PLATELET # BLD AUTO: 195 10E9/L (ref 150–450)
POTASSIUM SERPL-SCNC: 4.1 MMOL/L (ref 3.4–5.3)
RBC # BLD AUTO: 3.85 10E12/L (ref 4.4–5.9)
SODIUM SERPL-SCNC: 137 MMOL/L (ref 133–144)
TACROLIMUS BLD-MCNC: 13 UG/L (ref 5–15)
TME LAST DOSE: NORMAL H
WBC # BLD AUTO: 4.3 10E9/L (ref 4–11)

## 2020-05-18 PROCEDURE — 80197 ASSAY OF TACROLIMUS: CPT | Performed by: INTERNAL MEDICINE

## 2020-05-18 PROCEDURE — 87799 DETECT AGENT NOS DNA QUANT: CPT | Performed by: INTERNAL MEDICINE

## 2020-05-18 NOTE — TELEPHONE ENCOUNTER
ISSUE:  Rise in creatinine to 1.24. baseline 1 - 1.1, in setting of supratherapeutic tacro     ISSUE:   Tacrolimus IR level 13 on 5/18, goal 8-10, dose 2 mg BID.    PLAN:   Please call patient and confirm this was an accurate 12-hour trough. Verify Tacrolimus IR dose 2 mg BID. Confirm no new medications or illness. Confirm no missed doses. If accurate trough and accurate dose, decrease Tacrolimus IR dose to 1.5 mg BID and repeat labs as scheduled.    Jennifer Villarreal RN, BSN, [facility]  Transplant Care Coordinator      OUTCOME:   Spoke with patient, they confirm accurate trough level and current dose 2 mg BID. Patient confirmed dose change to 1.5 mg BID and to repeat labs in 2 weeks. Orders sent to preferred pharmacy for dose change and lab for repeat labs. Patient voiced understanding of plan.

## 2020-05-19 RX ORDER — TACROLIMUS 1 MG/1
1 CAPSULE ORAL 2 TIMES DAILY
Qty: 60 CAPSULE | Refills: 11 | Status: SHIPPED | OUTPATIENT
Start: 2020-05-19 | End: 2020-06-02

## 2020-05-19 RX ORDER — TACROLIMUS 0.5 MG/1
0.5 CAPSULE ORAL 2 TIMES DAILY
Qty: 60 CAPSULE | Refills: 11 | Status: SHIPPED | OUTPATIENT
Start: 2020-05-19 | End: 2020-06-02

## 2020-05-28 ENCOUNTER — TELEPHONE (OUTPATIENT)
Dept: TRANSPLANT | Facility: CLINIC | Age: 65
End: 2020-05-28

## 2020-05-28 DIAGNOSIS — Z94.0 KIDNEY REPLACED BY TRANSPLANT: ICD-10-CM

## 2020-05-28 RX ORDER — VITAMIN B COMPLEX
25 TABLET ORAL DAILY
Qty: 30 TABLET | Refills: 1 | Status: SHIPPED | OUTPATIENT
Start: 2020-05-28 | End: 2020-07-27

## 2020-05-28 NOTE — TELEPHONE ENCOUNTER
Patient Call: Voicemail  Date/Time: 5/28/2020-10:52am  Reason for call: Please connect with pt regarding letter of medial necessity  for his mycophenolate (GENERIC EQUIVALENT) 250 MG capsule  Please connect

## 2020-05-28 NOTE — TELEPHONE ENCOUNTER
Prior Authorization Specialty Medication Request    Medication/Dose:   mycophenolate (GENERIC EQUIVALENT) 250 MG capsule  Take 3 capsules (750 mg) by mouth 2 times daily      ICD code (if different than what is on RX):  Z94.0  Previously Tried and Failed:      Important Lab Values:   Rationale:     Insurance Name: Medicare  Insurance ID: [id number]  Insurance Phone Number:     Pharmacy Information (if different than what is on RX)  Name:  [facility]  Phone:  [phone]

## 2020-05-29 ENCOUNTER — TELEPHONE (OUTPATIENT)
Dept: TRANSPLANT | Facility: CLINIC | Age: 65
End: 2020-05-29

## 2020-05-29 RX ORDER — MYCOPHENOLATE MOFETIL 250 MG/1
750 CAPSULE ORAL 2 TIMES DAILY
Qty: 180 CAPSULE | Refills: 11 | Status: SHIPPED | OUTPATIENT
Start: 2020-05-29 | End: 2021-04-20

## 2020-05-29 NOTE — TELEPHONE ENCOUNTER
Prior Authorization Not Needed per Insurance    Medication: Mycophenolate - No PA Needed/Part B covered  Insurance Company: Medicare Part B    Expected CoPay:      Pharmacy Filling the Rx:  [facility]  Pharmacy Notified:  Unable to reach [facility], pharmacy is temporarily closed.  Patient Notified:      Prior authorization is not required due to coverage is available under Medicare Part-B benefits and patient has [employer] supplemental insurance.    Patient wasn't sure if [facility] is trying to ask for some type of CMN form or they mistakenly sent a prior auth request. Advised patient I will look into this more with [facility] once they are open again to figure out if the clinic staff needs to fill out a CMN renewal form.    In meantime, please send new prescription over to [facility] pharmacy and patient will  the medication there since [facility] is temporarily closed.  Routing to coordinator and lpn. Please expedite if possible as [facility] is closed at 5pm today.

## 2020-05-29 NOTE — TELEPHONE ENCOUNTER
Patient Call: Voicemail  Date/Time: 5/29/2020-11:42am  Reason for call: Please connect with pt regarding mycophenolate (GENERIC EQUIVALENT) 250 MG capsule, looks like he needs a medical letter

## 2020-06-01 DIAGNOSIS — Z94.0 KIDNEY REPLACED BY TRANSPLANT: ICD-10-CM

## 2020-06-01 DIAGNOSIS — Z94.0 KIDNEY REPLACED BY TRANSPLANT: Primary | ICD-10-CM

## 2020-06-01 DIAGNOSIS — Z79.899 LONG TERM USE OF DRUG: ICD-10-CM

## 2020-06-01 LAB
ANION GAP SERPL CALCULATED.3IONS-SCNC: 10 MMOL/L (ref 3–14)
BUN SERPL-MCNC: 22 MG/DL (ref 7–30)
CALCIUM SERPL-MCNC: 9.4 MG/DL (ref 8.5–10.1)
CHLORIDE SERPL-SCNC: 106 MMOL/L (ref 94–109)
CO2 SERPL-SCNC: 20 MMOL/L (ref 20–32)
CREAT SERPL-MCNC: 1.14 MG/DL (ref 0.66–1.25)
ERYTHROCYTE [DISTWIDTH] IN BLOOD BY AUTOMATED COUNT: 12.7 % (ref 10–15)
GFR SERPL CREATININE-BSD FRML MDRD: 67 ML/MIN/{1.73_M2}
GLUCOSE SERPL-MCNC: 153 MG/DL (ref 70–99)
HCT VFR BLD AUTO: 38.2 % (ref 40–53)
HGB BLD-MCNC: 13.1 G/DL (ref 13.3–17.7)
MAGNESIUM SERPL-MCNC: 1.4 MG/DL (ref 1.6–2.3)
MCH RBC QN AUTO: 33.5 PG (ref 26.5–33)
MCHC RBC AUTO-ENTMCNC: 34.3 G/DL (ref 31.5–36.5)
MCV RBC AUTO: 98 FL (ref 78–100)
PHOSPHATE SERPL-MCNC: 3.5 MG/DL (ref 2.5–4.5)
PLATELET # BLD AUTO: 188 10E9/L (ref 150–450)
POTASSIUM SERPL-SCNC: 4.1 MMOL/L (ref 3.4–5.3)
RBC # BLD AUTO: 3.91 10E12/L (ref 4.4–5.9)
SODIUM SERPL-SCNC: 136 MMOL/L (ref 133–144)
WBC # BLD AUTO: 4.3 10E9/L (ref 4–11)

## 2020-06-01 PROCEDURE — 87799 DETECT AGENT NOS DNA QUANT: CPT | Performed by: INTERNAL MEDICINE

## 2020-06-01 PROCEDURE — 80197 ASSAY OF TACROLIMUS: CPT | Performed by: INTERNAL MEDICINE

## 2020-06-01 NOTE — TELEPHONE ENCOUNTER
ISSUE:  Mag level 1.4, below goal.  Currently on mag oxide 800mg daily.    PLAN:  Confirm current dose.  If not N/V/D or heartburn, INCREASE to 800mg at lunch, 400mg at dinner.

## 2020-06-02 ENCOUNTER — TELEPHONE (OUTPATIENT)
Dept: TRANSPLANT | Facility: CLINIC | Age: 65
End: 2020-06-02

## 2020-06-02 ENCOUNTER — ALLIED HEALTH/NURSE VISIT (OUTPATIENT)
Dept: PHARMACY | Facility: CLINIC | Age: 65
End: 2020-06-02

## 2020-06-02 DIAGNOSIS — Z94.0 KIDNEY REPLACED BY TRANSPLANT: ICD-10-CM

## 2020-06-02 DIAGNOSIS — K21.9 GASTROESOPHAGEAL REFLUX DISEASE WITHOUT ESOPHAGITIS: ICD-10-CM

## 2020-06-02 DIAGNOSIS — Z94.0 KIDNEY TRANSPLANTED: Primary | ICD-10-CM

## 2020-06-02 DIAGNOSIS — E11.29 TYPE 2 DIABETES MELLITUS WITH OTHER DIABETIC KIDNEY COMPLICATION, WITHOUT LONG-TERM CURRENT USE OF INSULIN (H): Primary | ICD-10-CM

## 2020-06-02 DIAGNOSIS — E87.20 METABOLIC ACIDOSIS: ICD-10-CM

## 2020-06-02 LAB
TACROLIMUS BLD-MCNC: 12.8 UG/L (ref 5–15)
TME LAST DOSE: NORMAL H

## 2020-06-02 PROCEDURE — 99606 MTMS BY PHARM EST 15 MIN: CPT | Performed by: PHARMACIST

## 2020-06-02 RX ORDER — TACROLIMUS 1 MG/1
1 CAPSULE ORAL 2 TIMES DAILY
Qty: 60 CAPSULE | Refills: 11 | Status: SHIPPED | OUTPATIENT
Start: 2020-06-02 | End: 2020-08-11

## 2020-06-02 RX ORDER — TACROLIMUS 0.5 MG/1
CAPSULE ORAL
Qty: 60 CAPSULE | Refills: 11
Start: 2020-06-02 | End: 2020-08-11

## 2020-06-02 RX ORDER — MAGNESIUM OXIDE 400 MG/1
TABLET ORAL
Qty: 90 TABLET | Refills: 2 | Status: SHIPPED | OUTPATIENT
Start: 2020-06-02 | End: 2020-08-25

## 2020-06-02 NOTE — TELEPHONE ENCOUNTER
ISSUE:   Tacrolimus IR level 12.8 on 6/1/2020, goal 8-10, dose 1.5 mg BID.    PLAN:   Please call patient and confirm this was an accurate 12-hour trough. Verify Tacrolimus IR dose 1.5 mg BID. Confirm no new medications or illness. Confirm no missed doses. If accurate trough and accurate dose, decrease Tacrolimus IR dose to 1 mg BID and repeat labs in 1 weeks    Jennifer Villarreal RN, BSN, [facility]  Transplant Care Coordinator      OUTCOME:   Spoke with patient, they confirm accurate trough level and current dose 1.5 mg BID. Patient confirmed dose change to 1 mg BID and to repeat labs in 1 weeks. Orders sent to preferred pharmacy for dose change and lab for repeat labs. Patient voiced understanding of plan.

## 2020-06-02 NOTE — PATIENT INSTRUCTIONS
Recommendations from today's MTM visit:                                                      1. At next lab get an A1c drawn as well. This order was entered for you.    It was great to speak with you today.  I value your experience and would be very thankful for your time with providing feedback on our clinic survey. You may receive a survey via email or text message in the next few days.     Next MTM visit: 6 months    To schedule another MTM appointment, please call the clinic directly or you may call the MTM scheduling line at [phone] or toll-free at [phone].     My Clinical Pharmacist's contact information:                                                      It was a pleasure talking with you today!  Please feel free to contact me with any questions or concerns you have.      Eduardo Lea, PharmD  [facility] Pharmacist    Phone: [phone]     ,

## 2020-06-02 NOTE — PROGRESS NOTES
SUBJECTIVE/OBJECTIVE:                Murray Nicholson is a 64 year old male coming-in for a Follow-up MTM visit.  He was referred post txp.     Patient consented to a telehealth visit: yes  Telemedicine Visit Details  Type of service:  Telephone visit  Start Time: 11:00 AM  End Time: 11:12 AM  Originating Location (pt. Location): Home  Distant Location (provider location):  [facility] MEDICATION THERAPY MANAGEMENT  Mode of Communication:  Telephone    Chief Complaint: follow-up on last visit's plan:  1. Continue Pantoprazole 20mg every other day for a couple more weeks, then stop (restarted Pantoprazole 20mg every other day due to some rebound evening GERD sx.  2. Increase Metformin ER to 3 tablets daily for 1 week, then 4 tablets daily thereafter. Currently taking 2000mg daily  3. Stop Lantus when you reach max dose of Metformin. Discontinued Lantus  4. Increase Bactrim to every day as directed by transplant team. Taking daily.     Allergies/ADRs: Reviewed in Epic  Tobacco:  reports that he quit smoking about 25 years ago. His smoking use included cigarettes. He started smoking about 36 years ago. He has a 20.00 pack-year smoking history. He has never used smokeless tobacco.  Alcohol: not currently using  Caffeine: 0-1 cups/day of coffee, 1 cups/day of tea  PMH: Reviewed in Epic    Medication Adherence/Access:  Patient uses pill box(es).  Patient takes medications 4 time(s) per day. 9am and 9pm  Per patient, misses medication 0 times per week.   Medication barriers: none. Insulin is enpensive.   The patient fills medications at [place]: NO, fills medications at [facility].    Diabetes:  Pt currently taking Metformin ER 2000mg daily  Pt is not experiencing side effects. Stopped all insulin.  SMBG: one to two times daily.   Ranges (patient reported):   Date FBG/ 2hours post   6/2 118   6/1 115   5/31 135   5/30 126   5/29 124   5/28 122   5/27 132     Date FBG/ 2hours post Lunch/2hours post   5/8 132    5/7 130    5/6 120  150   5/5 124 128   5/4 112    5/3 127    5/2 116      Patient is not experiencing hypoglycemia  Recent symptoms of high blood sugar? None  Lab Results   Component Value Date    A1C 4.5 12/24/2019    A1C 4.3 12/20/2019    A1C 5.2 12/10/2019    A1C 5.6 09/23/2019    A1C 5.2 06/10/2019       GERD: Current medications include: Pantoprazole 20mg every other day  Famotidine 20mg twice daily. Pt c/o mild heartburn in the evening/ bedtime.  Patient feels that current regimen is effective.    Today's Vitals: There were no vitals taken for this visit.    ASSESSMENT:                 Medication Adherence: good, no issues today.    Renal/ post heart Transplant:  Pt is taking incorrect dose of Bactrim, should be every day, not every other day.     Diabetes: Due for A1c. BG well controlled.      GERD: Stable. He may continue to taper off PPI as tolerated.     PLAN:                1. A1c at next lab appt    I spent 20 minutes with this patient today. I offer these suggestions for consideration by txp team and CPA with PCP. A copy of the visit note was provided to the patient's referring provider.    Will follow up in 3 weeks.    The patient was given a summary of these recommendations as an after visit summary.    Eduardo Lea, PharmD  [facility] Pharmacist    Phone: [phone]

## 2020-06-02 NOTE — TELEPHONE ENCOUNTER
Spoke to patient who verbalizes understanding to   INCREASE Magnesium dose to 800mg at lunch, 400mg at dinner.

## 2020-06-03 ENCOUNTER — MYC MEDICAL ADVICE (OUTPATIENT)
Dept: PHARMACY | Facility: CLINIC | Age: 65
End: 2020-06-03

## 2020-06-03 DIAGNOSIS — K21.9 GASTROESOPHAGEAL REFLUX DISEASE WITHOUT ESOPHAGITIS: ICD-10-CM

## 2020-06-05 RX ORDER — FAMOTIDINE 20 MG/1
20 TABLET, FILM COATED ORAL 2 TIMES DAILY
Qty: 60 TABLET | Refills: 3 | Status: SHIPPED | OUTPATIENT
Start: 2020-06-05 | End: 2020-10-13

## 2020-06-15 ENCOUNTER — TELEPHONE (OUTPATIENT)
Dept: TRANSPLANT | Facility: CLINIC | Age: 65
End: 2020-06-15

## 2020-06-15 DIAGNOSIS — Z94.0 KIDNEY TRANSPLANTED: ICD-10-CM

## 2020-06-15 DIAGNOSIS — Z94.1 HEART REPLACED BY TRANSPLANT (H): Primary | ICD-10-CM

## 2020-06-15 LAB
ANION GAP SERPL CALCULATED.3IONS-SCNC: 8 MMOL/L (ref 3–14)
BUN SERPL-MCNC: 18 MG/DL (ref 7–30)
CALCIUM SERPL-MCNC: 9.1 MG/DL (ref 8.5–10.1)
CHLORIDE SERPL-SCNC: 105 MMOL/L (ref 94–109)
CO2 SERPL-SCNC: 24 MMOL/L (ref 20–32)
CREAT SERPL-MCNC: 1.05 MG/DL (ref 0.66–1.25)
ERYTHROCYTE [DISTWIDTH] IN BLOOD BY AUTOMATED COUNT: 12.8 % (ref 10–15)
GFR SERPL CREATININE-BSD FRML MDRD: 74 ML/MIN/{1.73_M2}
GLUCOSE SERPL-MCNC: 154 MG/DL (ref 70–99)
HCT VFR BLD AUTO: 38.3 % (ref 40–53)
HGB BLD-MCNC: 12.9 G/DL (ref 13.3–17.7)
MCH RBC QN AUTO: 32.3 PG (ref 26.5–33)
MCHC RBC AUTO-ENTMCNC: 33.7 G/DL (ref 31.5–36.5)
MCV RBC AUTO: 96 FL (ref 78–100)
PLATELET # BLD AUTO: 200 10E9/L (ref 150–450)
POTASSIUM SERPL-SCNC: 4.1 MMOL/L (ref 3.4–5.3)
RBC # BLD AUTO: 4 10E12/L (ref 4.4–5.9)
SODIUM SERPL-SCNC: 138 MMOL/L (ref 133–144)
TACROLIMUS BLD-MCNC: 6.4 UG/L (ref 5–15)
TME LAST DOSE: NORMAL H
WBC # BLD AUTO: 4.1 10E9/L (ref 4–11)

## 2020-06-15 PROCEDURE — 80197 ASSAY OF TACROLIMUS: CPT | Performed by: INTERNAL MEDICINE

## 2020-06-15 NOTE — LETTER
OUTPATIENT LABORATORY TEST ORDER    Patient: Murray Nicholson     Transplant Date: 12/19/2019  YOB: 1955     Ordered By: Dr. Shira Campbell  MRN: [id number]     Issued Date/Time: 12/23/19  9:23 AM         Expiration Date: 12/19/2020    Diagnosis: Kidney Transplant (ICD-10 Z94.0)    Aftercare following organ transplant (ICD-10 Z48.288)    Long term use of medications (ICD-10 Z79.899)    Lab results to be available on the same day drawn    Patient should release information to the [facility].     Please fax all results to [phone]      Months 4-7 post-transplant (4/21/2020 - 7/21/2020)  Labs every other week    CBC with platelets    Basic Metabolic Panel (Sodium, Potassium, Chloride, Creatinine, CO2, Urea Nitrogen, glucose, Calcium)    Tacrolimus/Prograf/ drug level  Monthly    Phosphorus, magnesium    BK (Polyoma Virus) PCR Quantitative/Plasma  **At month 7 only (7/2020)     DSA PRA (mailers provided by patient)       Months 7-12 post-transplant (7/22/2020 - 12/19/2020)  Monthly    CBC with platelets    Basic Metabolic Panel (Sodium, Potassium, Chloride, Creatinine, CO2, Urea Nitrogen, glucose, Calcium)    Tacrolimus/Prograf/ drug level    BK (Polyoma Virus) PCR Quantitative/Plasma    Other due labs:    6 months post-transplant clinic visit    LFTs    Hemoglobin A1c    Uric Acid    Lipid panel    Urine protein/creatinine    Allosure          9 months post-transplant clinic visit    Urine protein/creatinine    Allosure    12 months post-transplant clinic visit (Fasting labs)    LFTs    Hemoglobin A1c    Uric Acid    Lipid panel    Urine protein/creatinine    DSA PRA    PTH    Vitamin D    Allosure      If you have any questions please call the Transplant Center at [phone] option [phone]. All lab results should be faxed to [phone]        Shira Campbell MD FACS  Assistant Professor of Surgery  Director, Living Kid[facility]  Donor Program.

## 2020-06-15 NOTE — TELEPHONE ENCOUNTER
6 month Kidney and Pancreas Transplant Patient Phone Call    Congratulations on your 6 month post-transplant anniversary!    Please re-review with the patient how to contact the Transplant Center:  Best phone contact is [phone], as if the need is urgent, any care coordinator will be able to assist you.    Medications:  Your new tacrolimus goal level PER kidney team is 6-8, however, we will now follow the recommendations of your heart team.  CMV mismatch: No   *ALWAYS BRING YOUR MED CARD TO APPOINTMENTS.*    Please confirm if the patient is independent with medication set up and administration: Yes:  Date  .  Have you missed any medication doses in the past week: No.  Have you missed any medication doses in the past month: No.  Contact your pharmacy first for refills.  CONTACT THE TRANSPLANT CENTER IF YOU RUN OUT OF YOUR IS MEDICATIONS.    Labs:  DUE WITH NEXT DRAW:  Lipid panel (fasting), liver panel, uric acid, hemoglobin A1c, and urine sample to test for protein.  Continue labs e/o week for 1 more month.  After 7/19/2020, OKAY to reduce labs to 1x monthly, unless we direct you otherwise.  Contact the Transplant Center if additional lab orders are needed.    [facility] posted lab letter to [facility] for Murray.  EBV mismatch: No.  Murray is up to date for DSA and BK lab work.    General Transplant Recommendations:    A  will contact you to set you up for your 9 month and 1 year post kidney transplant follow up.    Frequent reviews of the transplant handbook and / or [facility].    Lab review on [facility].     It is now safe to resume your regular dental care.  Jennifer Villarreal, RN, BSN, [facility]  Transplant Care Coordinator

## 2020-06-22 ENCOUNTER — VIRTUAL VISIT (OUTPATIENT)
Dept: TRANSPLANT | Facility: CLINIC | Age: 65
End: 2020-06-22
Attending: SURGERY
Payer: MEDICARE

## 2020-06-22 ENCOUNTER — VIRTUAL VISIT (OUTPATIENT)
Dept: NEPHROLOGY | Facility: CLINIC | Age: 65
End: 2020-06-22
Attending: INTERNAL MEDICINE
Payer: MEDICARE

## 2020-06-22 VITALS
OXYGEN SATURATION: 99 % | WEIGHT: 171.08 LBS | SYSTOLIC BLOOD PRESSURE: 122 MMHG | TEMPERATURE: 95.7 F | DIASTOLIC BLOOD PRESSURE: 85 MMHG | BODY MASS INDEX: 25.45 KG/M2 | HEART RATE: 92 BPM

## 2020-06-22 DIAGNOSIS — N18.30 ANEMIA IN STAGE 3 CHRONIC KIDNEY DISEASE (H): ICD-10-CM

## 2020-06-22 DIAGNOSIS — E83.42 HYPOMAGNESEMIA: ICD-10-CM

## 2020-06-22 DIAGNOSIS — Z94.0 KIDNEY REPLACED BY TRANSPLANT: Primary | ICD-10-CM

## 2020-06-22 DIAGNOSIS — Z94.0 KIDNEY REPLACED BY TRANSPLANT: ICD-10-CM

## 2020-06-22 DIAGNOSIS — Z94.0 HTN, KIDNEY TRANSPLANT RELATED: ICD-10-CM

## 2020-06-22 DIAGNOSIS — N25.81 SECONDARY RENAL HYPERPARATHYROIDISM (H): ICD-10-CM

## 2020-06-22 DIAGNOSIS — Z48.298 AFTERCARE FOLLOWING ORGAN TRANSPLANT: Primary | ICD-10-CM

## 2020-06-22 DIAGNOSIS — Z29.89 NEED FOR PNEUMOCYSTIS PROPHYLAXIS: ICD-10-CM

## 2020-06-22 DIAGNOSIS — D47.2 MGUS (MONOCLONAL GAMMOPATHY OF UNKNOWN SIGNIFICANCE): ICD-10-CM

## 2020-06-22 DIAGNOSIS — I15.1 HTN, KIDNEY TRANSPLANT RELATED: ICD-10-CM

## 2020-06-22 DIAGNOSIS — E55.9 VITAMIN D DEFICIENCY: ICD-10-CM

## 2020-06-22 DIAGNOSIS — D63.1 ANEMIA IN STAGE 3 CHRONIC KIDNEY DISEASE (H): ICD-10-CM

## 2020-06-22 DIAGNOSIS — Z94.1 HEART REPLACED BY TRANSPLANT (H): ICD-10-CM

## 2020-06-22 DIAGNOSIS — D84.9 IMMUNOSUPPRESSION (H): ICD-10-CM

## 2020-06-22 ASSESSMENT — PAIN SCALES - GENERAL: PAINLEVEL: NO PAIN (0)

## 2020-06-22 NOTE — LETTER
"6/22/2020      RE: Murray Nicholson  [address]  [place] [address]       Murray Nicholson is a 65 year old male who is being evaluated via a billable video visit.      The patient has been notified of following:     \"This video visit will be conducted via a call between you and your physician/provider. We have found that certain health care needs can be provided without the need for an in-person physical exam.  This service lets us provide the care you need with a video conversation.  If a prescription is necessary we can send it directly to your pharmacy.  If lab work is needed we can place an order for that and you can then stop by our lab to have the test done at a later time.    Video visits are billed at different rates depending on your insurance coverage.  Please reach out to your insurance provider with any questions.    If during the course of the call the physician/provider feels a video visit is not appropriate, you will not be charged for this service.\"    Patient has given verbal consent for Video visit? Yes    Will anyone else be joining your video visit? No    Video-Visit Details    Type of service:  Video Visit    Video Start Time: 1034  Video End Time: 1050    Originating Location (pt. Location): Home    Distant Location (provider location):  [facility]     Platform used for Video Visit: Morgan Quijano MD      ACUTE TRANSPLANT NEPHROLOGY VISIT    Assessment & Plan   # LDKT: Stable   - Baseline Cr ~ 1.0-1.2   - Proteinuria: Normal (<0.2 grams)   - Date DSA Last Checked: Apr/2020      Latest DSA: No   - BK Viremia: No   - Kidney Tx Biopsy: No    # Heart Transplant: Appears to be stable.  Followed by Cardiology.    # Immunosuppression: Tacrolimus immediate release (goal 6-8) and Mycophenolate mofetil (dose 750 mg every 12 hours)   - Changes: No    # Infection Prophylaxis:   - PJP: Sulfa/TMP (Bactrim)  - CMV: None, prophylaxis completed    # Hypertension: " Controlled;  Goal BP: < 130/80   - Changes: No    # Diabetes: Controlled (HbA1c <7%) Last HbA1c: 4.5%   - Management as per primary care.    # Anemia in Chronic Renal Disease: Hgb: Stable      ANASTASIYA: No   - Iron studies: Replete    # Mineral Bone Disorder:   - Secondary renal hyperparathyroidism; PTH level: Minimally elevated ( pg/ml)        On treatment: None  - Vitamin D; level: Low        On supplement: Yes  - Calcium; level: Normal        On supplement: No    # Electrolytes:   - Potassium; level: Normal        On supplement: No  - Magnesium; level: Low normal        On supplement: Yes  - Bicarbonate; level: Normal        On supplement: Yes    # PAD: Asymptomatic.    # MGUS: Peak monoclonal protein was ~ 0.5.   - Will recheck serum kappa/lambda light chain and SPEP.    # GERD: Symptoms controlled on every other day PPI and daily H2 blocker.    # SHEELA: Patient doesn't appear to have any symptoms, but has not been able to tolerate CPAP.    # Medical Compliance: Yes     # COVID-19 Virus Review: Discussed COVID-19 virus and the potential medical risks.  Reviewed preventative health recommendations, which includes washing hands for 20 seconds, avoid touching your face, and social distancing.  Asked patient to inform the transplant center if they are exposed or diagnosed with this virus.    # Transplant History:  Etiology of Kidney Failure: Diabetes mellitus type 2  Tx: LDKT and Heart Tx  Transplant: 12/19/2019 (Kidney), 6/14/2018 (Heart)  Donor Type: Living Donor Class:   Crossmatch at time of Tx: negative  DSA at time of Tx: No  Significant changes in immunosuppression: None  CMV IgG Ab High Risk Discordance (D+/R-): No  EBV IgG Ab High Risk Discordance (D+/R-): No  Significant transplant-related complications: None    Transplant Office Phone Number: [phone]    Assessment and plan was discussed with the patient and he voiced his understanding and agreement.    Return visit: Return in about 3 months (around  "9/22/2020).    Giovany Quijano MD    Chief Complaint   Mr. Nicholson is a 65 year old here for kidney transplant and immunosuppression management.     History of Present Illness    Mr. Nicholson reports feeling good overall with some medical complaints.  Since last clinic visit, patient reports no hospitalizations or new medical complaints and has been doing well overall.  His energy level is good and is now pretty much normal.  He is active and gets some exercise, mostly with walking.  Denies any chest pain or shortness of breath with exertion.  Appetite is \"too good\" and his weight has been up ~ 5 lbs.  No nausea, vomiting or diarrhea.  His heartburn symptoms are controlled with taking every other day PPI and daily H2 blocker.  No fever, sweats or chills.  No leg swelling.    Recent Hospitalizations:  [x] No [] Yes    New Medical Issues: [x] No [] Yes    Decreased energy: [x] No [] Yes    Chest pain or SOB with exertion:  [x] No [] Yes    Appetite change or weight change: [] No [x] Yes Weight is up ~ 5 lbs   Nausea, vomiting or diarrhea:  [x] No [] Yes    Fever, sweats or chills: [x] No [] Yes    Leg swelling: [x] No [] Yes      Home BP: 110-120/80-90s    Review of Systems   A comprehensive review of systems was obtained and negative, except as noted in the HPI or PMH.    Problem List   Patient Active Problem List   Diagnosis     Esophageal reflux     Allergic rhinitis     Anemia in chronic renal disease     Coronary artery disease involving native artery of transplanted heart without angina pectoris     Dyslipidemia     MGUS (monoclonal gammopathy of unknown significance)     Ascending aortic aneurysm (H)     Sigmoid diverticulitis     Heart replaced by transplant (H)     Aortic stenosis     Status post coronary angiogram     Immunosuppression (H)     Type 2 diabetes mellitus (H)     Pancreatic cyst     Kidney replaced by transplant     Aftercare following organ transplant     HTN, kidney transplant related     " Secondary renal hyperparathyroidism (H)     Vitamin D deficiency     Hypomagnesemia     SHEELA on CPAP     Need for pneumocystis prophylaxis       Social History   Social History     Tobacco Use     Smoking status: Former Smoker     Packs/day: 1.00     Years: 20.00     Pack years: 20.00     Types: Cigarettes     Start date: 1984     Last attempt to quit: 1994     Years since quittin.8     Smokeless tobacco: Never Used   Substance Use Topics     Alcohol use: No     Alcohol/week: 0.0 standard drinks     Drug use: No       Allergies   Allergies   Allergen Reactions     Norco [Hydrocodone-Acetaminophen] Nausea and Vomiting     Cats      Throat tightness     Isosorbide Other (See Comments)     hypotension     Penicillins Hives     Seasonal Allergies      rhinitis     Shrimp      Throat closes        Medications   Current Outpatient Medications   Medication Sig     ACCU-CHEK GUIDE test strip USE TO TEST BLOOD SUGAR 2 TO 3 TIMES DAILY OR AS DIRECTED     acetaminophen (TYLENOL) 325 MG tablet Take 2 tablets (650 mg) by mouth every 4 hours as needed for other (multimodal surgical pain management along with NSAIDS and opioid medication as indicated based on pain control and physical function.)     aspirin 81 MG EC tablet Take 81 mg by mouth daily     atorvastatin (LIPITOR) 40 MG tablet Take 1 tablet (40 mg) by mouth daily     blood glucose monitoring (ACCU-CHEK FASTCLIX) lancets Use to test blood sugar 2 times daily or as directed.     famotidine (PEPCID) 20 MG tablet Take 1 tablet (20 mg) by mouth 2 times daily     fluticasone (FLONASE) 50 MCG/ACT nasal spray Spray 1 spray into both nostrils daily (Patient taking differently: Spray 1 spray into both nostrils daily as needed )     insulin glargine (LANTUS PEN) 100 UNIT/ML pen Inject 10 Units Subcutaneous every morning Or as directed     insulin pen needle (B-D U/F) 31G X 5 MM miscellaneous Use 1 daily as directed.     magnesium oxide (MAG-OX) 400 MG tablet Total dose  = 800 mg at lunch time and 400 mg at dinner time     metFORMIN (GLUCOPHAGE-XR) 500 MG 24 hr tablet Take 4 tablets (2,000 mg) by mouth daily (with breakfast)     Multiple Vitamin (DAILY-DONY) TABS TAKE 1 TABLET BY MOUTH DAILY     mycophenolate (GENERIC EQUIVALENT) 250 MG capsule Take 3 capsules (750 mg) by mouth 2 times daily     sodium bicarbonate 650 MG tablet Take 2 tablets (1,300 mg) by mouth daily     sulfamethoxazole-trimethoprim (BACTRIM/SEPTRA) 400-80 MG tablet Take 1 tablet by mouth daily     tacrolimus (GENERIC EQUIVALENT) 0.5 MG capsule HOLD     tacrolimus (GENERIC EQUIVALENT) 1 MG capsule Take 1 capsule (1 mg) by mouth 2 times daily     Vitamin D3 (CHOLECALCIFEROL) 25 mcg (1000 units) tablet Take 1 tablet (25 mcg) by mouth daily     No current facility-administered medications for this visit.      Facility-Administered Medications Ordered in Other Visits   Medication     diatrizoate meglumine-sodium (GASTROGRAFIN/GASTROVIEW) 66-10 % solution 480 mL     Medications Discontinued During This Encounter   Medication Reason     melatonin 1 MG TABS tablet        Physical Exam   Vital Signs: /85   Pulse 92   Temp 95.7  F (35.4  C)   Wt 77.6 kg (171 lb 1.2 oz)   SpO2 99%   BMI 25.45 kg/m      GENERAL APPEARANCE: alert and no distress  HENT: no obvious abnormalities on appearance  RESP: breathing appears unremarkable with normal rate, no audible wheezing or cough and no apparent shortness of breath with conversation  MS: extremities normal - no gross deformities noted, no evidence of inflammation in joints, no muscle tenderness  SKIN: no apparent rash and normal skin tone  NEURO: speech is clear with no obvious neurological deficits  PSYCH: mentation appears normal and affect normal    Data     Renal Latest Ref Rng & Units 6/15/2020 6/1/2020 5/18/2020   Na 133 - 144 mmol/L 138 136 137   K 3.4 - 5.3 mmol/L 4.1 4.1 4.1   Cl 94 - 109 mmol/L 105 106 106   CO2 20 - 32 mmol/L 24 20 23   BUN 7 - 30 mg/dL 18 22  22   Cr 0.66 - 1.25 mg/dL 1.05 1.14 1.24   Glucose 70 - 99 mg/dL 154(H) 153(H) 139(H)   Ca  8.5 - 10.1 mg/dL 9.1 9.4 9.6   Mg 1.6 - 2.3 mg/dL - 1.4(L) -     Bone Health Latest Ref Rng & Units 6/1/2020 5/4/2020 4/13/2020   Phos 2.5 - 4.5 mg/dL 3.5 3.7 -   PTHi 18 - 80 pg/mL - - 149(H)   Vit D Def 20 - 75 ug/L - - -     Heme Latest Ref Rng & Units 6/15/2020 6/1/2020 5/18/2020   WBC 4.0 - 11.0 10e9/L 4.1 4.3 4.3   Hgb 13.3 - 17.7 g/dL 12.9(L) 13.1(L) 12.9(L)   Plt 150 - 450 10e9/L 200 188 195   ABSOLUTE NEUTROPHIL 1.6 - 8.3 10e9/L - - -   ABSOLUTE LYMPHOCYTES 0.8 - 5.3 10e9/L - - -   ABSOLUTE MONOCYTES 0.0 - 1.3 10e9/L - - -   ABSOLUTE EOSINOPHILS 0.0 - 0.7 10e9/L - - -   ABSOLUTE BASOPHILS 0.0 - 0.2 10e9/L - - -   ABS IMMATURE GRANULOCYTES 0 - 0.4 10e9/L - - -   ABSOLUTE NUCLEATED RBC - - - -     Liver Latest Ref Rng & Units 12/24/2019 12/10/2019 10/28/2019   AP 40 - 150 U/L 278(H) 372(H) 318(H)   TBili 0.2 - 1.3 mg/dL 1.1 1.0 0.7   DBili 0.0 - 0.2 mg/dL 0.2 - -   ALT 0 - 70 U/L 18 16 14   AST 0 - 45 U/L 15 11 11   Tot Protein 6.8 - 8.8 g/dL 7.1 7.6 7.6   Albumin 3.4 - 5.0 g/dL 3.8 4.0 3.8     Pancreas Latest Ref Rng & Units 12/24/2019 12/20/2019 12/10/2019   A1C 0 - 5.6 % 4.5 4.3 5.2   Amylase 30 - 110 U/L - - -     Iron studies Latest Ref Rng & Units 12/10/2019 6/10/2019 7/10/2018   Iron 35 - 180 ug/dL 84 118 66   Iron sat 15 - 46 % 35 47(H) 28   Ferritin 26 - 388 ng/mL 445(H) 644(H) 771(H)     [facility] Txp Virology Latest Ref Rng & Units 6/1/2020 5/18/2020 5/4/2020   CVM DNA Quant - - - -   CMV QUANT IU/ML CMVND:CMV DNA Not Detected [IU]/mL - - -   LOG IU/ML OF CMVQNT <2.1 [Log:IU]/mL - - -   BK Spec - Plasma Plasma, EDTA anticoagulant Plasma   BK Res BKNEG:BK Virus DNA Not Detected copies/mL BK Virus DNA Not Detected BK Virus DNA Not Detected BK Virus DNA Not Detected   BK Log <2.7 Log copies/mL Not Calculated Not Calculated Not Calculated   EBV CAPSID ANTIBODY IGG 0.0 - 0.8 AI - - -   EBV DNA COPIES/ML EBVNEG:EBV DNA  Not Detected [Copies]/mL - - -   EBV DNA LOG OF COPIES <2.7 [Log:copies]/mL - - -   Hep B Core NR:Nonreactive - - -        Recent Labs   Lab Test 05/18/20  0936 06/01/20  0938 06/15/20  0914   DOSTAC 925P 5/17/2020 2200,5/31 06/14/20 930PM   TACROL 13.0 12.8 6.4     Recent Labs   Lab Test 12/26/19  0720 01/27/20  0918 02/03/20  0948   DOSMPA Not Provided NTP 02/02/20 0915pm   MPACID 1.65 2.39 2.41   MPAG 58.9 54.6 50.4     Giovany Quijano MD

## 2020-06-22 NOTE — PROGRESS NOTES
"Murray Nicholson is a 65 year old male who is being evaluated via a billable video visit.      The patient has been notified of following:     \"This video visit will be conducted via a call between you and your physician/provider. We have found that certain health care needs can be provided without the need for an in-person physical exam.  This service lets us provide the care you need with a video conversation.  If a prescription is necessary we can send it directly to your pharmacy.  If lab work is needed we can place an order for that and you can then stop by our lab to have the test done at a later time.    Video visits are billed at different rates depending on your insurance coverage.  Please reach out to your insurance provider with any questions.    If during the course of the call the physician/provider feels a video visit is not appropriate, you will not be charged for this service.\"    Patient has given verbal consent for Video visit? Yes    Will anyone else be joining your video visit? No    Video-Visit Details    Type of service:  Video Visit    Video Start Time: 1034  Video End Time: 1050    Originating Location (pt. Location): Home    Distant Location (provider location):  [facility] NEPHROLOGY     Platform used for Video Visit: [facility]    Giovany Quijano MD      ACUTE TRANSPLANT NEPHROLOGY VISIT    Assessment & Plan   # LDKT: Stable   - Baseline Cr ~ 1.0-1.2   - Proteinuria: Normal (<0.2 grams)   - Date DSA Last Checked: Apr/2020      Latest DSA: No   - BK Viremia: No   - Kidney Tx Biopsy: No    # Heart Transplant: Appears to be stable.  Followed by Cardiology.    # Immunosuppression: Tacrolimus immediate release (goal 6-8) and Mycophenolate mofetil (dose 750 mg every 12 hours)   - Changes: No    # Infection Prophylaxis:   - PJP: Sulfa/TMP (Bactrim)  - CMV: None, prophylaxis completed    # Hypertension: Controlled;  Goal BP: < 130/80   - Changes: No    # Diabetes: Controlled (HbA1c <7%) Last HbA1c: " 4.5%   - Management as per primary care.    # Anemia in Chronic Renal Disease: Hgb: Stable      ANASTASIYA: No   - Iron studies: Replete    # Mineral Bone Disorder:   - Secondary renal hyperparathyroidism; PTH level: Minimally elevated ( pg/ml)        On treatment: None  - Vitamin D; level: Low        On supplement: Yes  - Calcium; level: Normal        On supplement: No    # Electrolytes:   - Potassium; level: Normal        On supplement: No  - Magnesium; level: Low normal        On supplement: Yes  - Bicarbonate; level: Normal        On supplement: Yes    # PAD: Asymptomatic.    # MGUS: Peak monoclonal protein was ~ 0.5.   - Will recheck serum kappa/lambda light chain and SPEP.    # GERD: Symptoms controlled on every other day PPI and daily H2 blocker.    # SHEELA: Patient doesn't appear to have any symptoms, but has not been able to tolerate CPAP.    # Medical Compliance: Yes     # COVID-19 Virus Review: Discussed COVID-19 virus and the potential medical risks.  Reviewed preventative health recommendations, which includes washing hands for 20 seconds, avoid touching your face, and social distancing.  Asked patient to inform the transplant center if they are exposed or diagnosed with this virus.    # Transplant History:  Etiology of Kidney Failure: Diabetes mellitus type 2  Tx: LDKT and Heart Tx  Transplant: 12/19/2019 (Kidney), 6/14/2018 (Heart)  Donor Type: Living Donor Class:   Crossmatch at time of Tx: negative  DSA at time of Tx: No  Significant changes in immunosuppression: None  CMV IgG Ab High Risk Discordance (D+/R-): No  EBV IgG Ab High Risk Discordance (D+/R-): No  Significant transplant-related complications: None    Transplant Office Phone Number: [phone]    Assessment and plan was discussed with the patient and he voiced his understanding and agreement.    Return visit: Return in about 3 months (around 9/22/2020).    Giovany Quijano MD    Chief Complaint   Mr. Nicholson is a 65 year old here for  "kidney transplant and immunosuppression management.     History of Present Illness    Mr. Nicholson reports feeling good overall with some medical complaints.  Since last clinic visit, patient reports no hospitalizations or new medical complaints and has been doing well overall.  His energy level is good and is now pretty much normal.  He is active and gets some exercise, mostly with walking.  Denies any chest pain or shortness of breath with exertion.  Appetite is \"too good\" and his weight has been up ~ 5 lbs.  No nausea, vomiting or diarrhea.  His heartburn symptoms are controlled with taking every other day PPI and daily H2 blocker.  No fever, sweats or chills.  No leg swelling.    Recent Hospitalizations:  [x] No [] Yes    New Medical Issues: [x] No [] Yes    Decreased energy: [x] No [] Yes    Chest pain or SOB with exertion:  [x] No [] Yes    Appetite change or weight change: [] No [x] Yes Weight is up ~ 5 lbs   Nausea, vomiting or diarrhea:  [x] No [] Yes    Fever, sweats or chills: [x] No [] Yes    Leg swelling: [x] No [] Yes      Home BP: 110-120/80-90s    Review of Systems   A comprehensive review of systems was obtained and negative, except as noted in the HPI or PMH.    Problem List   Patient Active Problem List   Diagnosis     Esophageal reflux     Allergic rhinitis     Anemia in chronic renal disease     Coronary artery disease involving native artery of transplanted heart without angina pectoris     Dyslipidemia     MGUS (monoclonal gammopathy of unknown significance)     Ascending aortic aneurysm (H)     Sigmoid diverticulitis     Heart replaced by transplant (H)     Aortic stenosis     Status post coronary angiogram     Immunosuppression (H)     Type 2 diabetes mellitus (H)     Pancreatic cyst     Kidney replaced by transplant     Aftercare following organ transplant     HTN, kidney transplant related     Secondary renal hyperparathyroidism (H)     Vitamin D deficiency     Hypomagnesemia     SHEELA on " CPAP     Need for pneumocystis prophylaxis       Social History   Social History     Tobacco Use     Smoking status: Former Smoker     Packs/day: 1.00     Years: 20.00     Pack years: 20.00     Types: Cigarettes     Start date: 1984     Last attempt to quit: 1994     Years since quittin.8     Smokeless tobacco: Never Used   Substance Use Topics     Alcohol use: No     Alcohol/week: 0.0 standard drinks     Drug use: No       Allergies   Allergies   Allergen Reactions     Norco [Hydrocodone-Acetaminophen] Nausea and Vomiting     Cats      Throat tightness     Isosorbide Other (See Comments)     hypotension     Penicillins Hives     Seasonal Allergies      rhinitis     Shrimp      Throat closes        Medications   Current Outpatient Medications   Medication Sig     ACCU-CHEK GUIDE test strip USE TO TEST BLOOD SUGAR 2 TO 3 TIMES DAILY OR AS DIRECTED     acetaminophen (TYLENOL) 325 MG tablet Take 2 tablets (650 mg) by mouth every 4 hours as needed for other (multimodal surgical pain management along with NSAIDS and opioid medication as indicated based on pain control and physical function.)     aspirin 81 MG EC tablet Take 81 mg by mouth daily     atorvastatin (LIPITOR) 40 MG tablet Take 1 tablet (40 mg) by mouth daily     blood glucose monitoring (ACCU-CHEK FASTCLIX) lancets Use to test blood sugar 2 times daily or as directed.     famotidine (PEPCID) 20 MG tablet Take 1 tablet (20 mg) by mouth 2 times daily     fluticasone (FLONASE) 50 MCG/ACT nasal spray Spray 1 spray into both nostrils daily (Patient taking differently: Spray 1 spray into both nostrils daily as needed )     insulin glargine (LANTUS PEN) 100 UNIT/ML pen Inject 10 Units Subcutaneous every morning Or as directed     insulin pen needle (B-D U/F) 31G X 5 MM miscellaneous Use 1 daily as directed.     magnesium oxide (MAG-OX) 400 MG tablet Total dose = 800 mg at lunch time and 400 mg at dinner time     metFORMIN (GLUCOPHAGE-XR) 500 MG 24 hr  tablet Take 4 tablets (2,000 mg) by mouth daily (with breakfast)     Multiple Vitamin (DAILY-DONY) TABS TAKE 1 TABLET BY MOUTH DAILY     mycophenolate (GENERIC EQUIVALENT) 250 MG capsule Take 3 capsules (750 mg) by mouth 2 times daily     sodium bicarbonate 650 MG tablet Take 2 tablets (1,300 mg) by mouth daily     sulfamethoxazole-trimethoprim (BACTRIM/SEPTRA) 400-80 MG tablet Take 1 tablet by mouth daily     tacrolimus (GENERIC EQUIVALENT) 0.5 MG capsule HOLD     tacrolimus (GENERIC EQUIVALENT) 1 MG capsule Take 1 capsule (1 mg) by mouth 2 times daily     Vitamin D3 (CHOLECALCIFEROL) 25 mcg (1000 units) tablet Take 1 tablet (25 mcg) by mouth daily     No current facility-administered medications for this visit.      Facility-Administered Medications Ordered in Other Visits   Medication     diatrizoate meglumine-sodium (GASTROGRAFIN/GASTROVIEW) 66-10 % solution 480 mL     Medications Discontinued During This Encounter   Medication Reason     melatonin 1 MG TABS tablet        Physical Exam   Vital Signs: /85   Pulse 92   Temp 95.7  F (35.4  C)   Wt 77.6 kg (171 lb 1.2 oz)   SpO2 99%   BMI 25.45 kg/m      GENERAL APPEARANCE: alert and no distress  HENT: no obvious abnormalities on appearance  RESP: breathing appears unremarkable with normal rate, no audible wheezing or cough and no apparent shortness of breath with conversation  MS: extremities normal - no gross deformities noted, no evidence of inflammation in joints, no muscle tenderness  SKIN: no apparent rash and normal skin tone  NEURO: speech is clear with no obvious neurological deficits  PSYCH: mentation appears normal and affect normal    Data     Renal Latest Ref Rng & Units 6/15/2020 6/1/2020 5/18/2020   Na 133 - 144 mmol/L 138 136 137   K 3.4 - 5.3 mmol/L 4.1 4.1 4.1   Cl 94 - 109 mmol/L 105 106 106   CO2 20 - 32 mmol/L 24 20 23   BUN 7 - 30 mg/dL 18 22 22   Cr 0.66 - 1.25 mg/dL 1.05 1.14 1.24   Glucose 70 - 99 mg/dL 154(H) 153(H) 139(H)   Ca   8.5 - 10.1 mg/dL 9.1 9.4 9.6   Mg 1.6 - 2.3 mg/dL - 1.4(L) -     Bone Health Latest Ref Rng & Units 6/1/2020 5/4/2020 4/13/2020   Phos 2.5 - 4.5 mg/dL 3.5 3.7 -   PTHi 18 - 80 pg/mL - - 149(H)   Vit D Def 20 - 75 ug/L - - -     Heme Latest Ref Rng & Units 6/15/2020 6/1/2020 5/18/2020   WBC 4.0 - 11.0 10e9/L 4.1 4.3 4.3   Hgb 13.3 - 17.7 g/dL 12.9(L) 13.1(L) 12.9(L)   Plt 150 - 450 10e9/L 200 188 195   ABSOLUTE NEUTROPHIL 1.6 - 8.3 10e9/L - - -   ABSOLUTE LYMPHOCYTES 0.8 - 5.3 10e9/L - - -   ABSOLUTE MONOCYTES 0.0 - 1.3 10e9/L - - -   ABSOLUTE EOSINOPHILS 0.0 - 0.7 10e9/L - - -   ABSOLUTE BASOPHILS 0.0 - 0.2 10e9/L - - -   ABS IMMATURE GRANULOCYTES 0 - 0.4 10e9/L - - -   ABSOLUTE NUCLEATED RBC - - - -     Liver Latest Ref Rng & Units 12/24/2019 12/10/2019 10/28/2019   AP 40 - 150 U/L 278(H) 372(H) 318(H)   TBili 0.2 - 1.3 mg/dL 1.1 1.0 0.7   DBili 0.0 - 0.2 mg/dL 0.2 - -   ALT 0 - 70 U/L 18 16 14   AST 0 - 45 U/L 15 11 11   Tot Protein 6.8 - 8.8 g/dL 7.1 7.6 7.6   Albumin 3.4 - 5.0 g/dL 3.8 4.0 3.8     Pancreas Latest Ref Rng & Units 12/24/2019 12/20/2019 12/10/2019   A1C 0 - 5.6 % 4.5 4.3 5.2   Amylase 30 - 110 U/L - - -     Iron studies Latest Ref Rng & Units 12/10/2019 6/10/2019 7/10/2018   Iron 35 - 180 ug/dL 84 118 66   Iron sat 15 - 46 % 35 47(H) 28   Ferritin 26 - 388 ng/mL 445(H) 644(H) 771(H)     [facility] Txp Virology Latest Ref Rng & Units 6/1/2020 5/18/2020 5/4/2020   CVM DNA Quant - - - -   CMV QUANT IU/ML CMVND:CMV DNA Not Detected [IU]/mL - - -   LOG IU/ML OF CMVQNT <2.1 [Log:IU]/mL - - -   BK Spec - Plasma Plasma, EDTA anticoagulant Plasma   BK Res BKNEG:BK Virus DNA Not Detected copies/mL BK Virus DNA Not Detected BK Virus DNA Not Detected BK Virus DNA Not Detected   BK Log <2.7 Log copies/mL Not Calculated Not Calculated Not Calculated   EBV CAPSID ANTIBODY IGG 0.0 - 0.8 AI - - -   EBV DNA COPIES/ML EBVNEG:EBV DNA Not Detected [Copies]/mL - - -   EBV DNA LOG OF COPIES <2.7 [Log:copies]/mL - - -   Hep B  Core NR:Nonreactive - - -        Recent Labs   Lab Test 05/18/20  0936 06/01/20  0938 06/15/20  0914   DOSTAC 925P 5/17/2020 2200,5/31 06/14/20 930PM   TACROL 13.0 12.8 6.4     Recent Labs   Lab Test 12/26/19  0720 01/27/20  0918 02/03/20  0948   DOSMPA Not Provided NTP 02/02/20 0915pm   MPACID 1.65 2.39 2.41   MPAG 58.9 54.6 50.4

## 2020-06-22 NOTE — LETTER
2020         RE: Murray Nicholson  [address]  [place] [address]        Dear Colleague,    Thank you for referring your patient, Murray Nicholson, to the [facility] SOLID [facility]GAN TRANSPLANT. Please see a copy of my visit note below.    Post Transplant Patient Social Work Assessment -Outpatient    Patient Name: Murray Nicholson  : 1955  Age: 65 year old  MRN: [id number]  Date of transplant: Kidney 2019, Heart 2018    Patient known to this writer from follow up in the kidney transplant program. Telephone visit completed with patient today to update assessment.      Presenting Information   Living Situation: Lives alone in an apartment in [place]  Functional Status: Independent with ADL's, drives  Cultural/Language/Spiritual Considerations:  Male    Support System  Primary Support Person Friend, Naresh  Other support:  2 Adult Sons, Jasper and Jordi    Health Care Directive  Decision Maker: Patient  Alternate Decision Maker: Son, Jasper, then other son Jordi  Health Care Directive: Copy in Chart    Mental Health/Coping:   History of Mental Health: Denied  History of Chemical Health: Denied  Current status: Patient denied any current mental health or chemical health concerns. Pt stated even with COVID-19 he is doing well.  Coping: Watches TV, listens to music, take walks  Services Needed/Recommended: None identified at this time    Financial   Income: Social Security  Impact of transplant on income: None identified at this time  Insurance and medication coverage: Medicare, C[employer] Supplement and Aetna Part D  Financial concerns: None identified at this time  Resources needed: None identified at this time      Education provided by SW: Social Work role outpatient setting and post-transplant expectations.    Assessment and recommendations and plan:  Pt had a heart transplant 2 years ago. He was on dialysis prior to transplant. Pt reported he takes his medications as prescribed and  attends appointments/labs as scheduled. Reviewed post-transplant expectations as well as psychosocial risks of transplant. Patient seemed to process information well via telephone. Pt had no questions or concerns for this writer.     VIVIEN Cameron    Kidney/Pancreas/[facility] Transplant Programs              Again, thank you for allowing me to participate in the care of your patient.        Sincerely,        BLESSING Rowan

## 2020-06-22 NOTE — LETTER
"6/22/2020       RE: Murray Nicholson  [address]  [place] [address]     Dear Colleague,    Thank you for referring your patient, Murray Nicholson, to the [facility] at [facility]. Please see a copy of my visit note below.    Murray Nicholson is a 65 year old male who is being evaluated via a billable video visit.      The patient has been notified of following:     \"This video visit will be conducted via a call between you and your physician/provider. We have found that certain health care needs can be provided without the need for an in-person physical exam.  This service lets us provide the care you need with a video conversation.  If a prescription is necessary we can send it directly to your pharmacy.  If lab work is needed we can place an order for that and you can then stop by our lab to have the test done at a later time.    Video visits are billed at different rates depending on your insurance coverage.  Please reach out to your insurance provider with any questions.    If during the course of the call the physician/provider feels a video visit is not appropriate, you will not be charged for this service.\"    Patient has given verbal consent for Video visit? Yes    Will anyone else be joining your video visit? No    Video-Visit Details    Type of service:  Video Visit    Video Start Time: 1034  Video End Time: 1050    Originating Location (pt. Location): Home    Distant Location (provider location):  [facility]     Platform used for Video Visit: Morgan Quijano MD      ACUTE TRANSPLANT NEPHROLOGY VISIT    Assessment & Plan   # LDKT: Stable   - Baseline Cr ~ 1.0-1.2   - Proteinuria: Normal (<0.2 grams)   - Date DSA Last Checked: Apr/2020      Latest DSA: No   - BK Viremia: No   - Kidney Tx Biopsy: No    # Heart Transplant: Appears to be stable.  Followed by Cardiology.    # Immunosuppression: Tacrolimus immediate release (goal 6-8) " and Mycophenolate mofetil (dose 750 mg every 12 hours)   - Changes: No    # Infection Prophylaxis:   - PJP: Sulfa/TMP (Bactrim)  - CMV: None, prophylaxis completed    # Hypertension: Controlled;  Goal BP: < 130/80   - Changes: No    # Diabetes: Controlled (HbA1c <7%) Last HbA1c: 4.5%   - Management as per primary care.    # Anemia in Chronic Renal Disease: Hgb: Stable      ANASTASIYA: No   - Iron studies: Replete    # Mineral Bone Disorder:   - Secondary renal hyperparathyroidism; PTH level: Minimally elevated ( pg/ml)        On treatment: None  - Vitamin D; level: Low        On supplement: Yes  - Calcium; level: Normal        On supplement: No    # Electrolytes:   - Potassium; level: Normal        On supplement: No  - Magnesium; level: Low normal        On supplement: Yes  - Bicarbonate; level: Normal        On supplement: Yes    # PAD: Asymptomatic.    # MGUS: Peak monoclonal protein was ~ 0.5.   - Will recheck serum kappa/lambda light chain and SPEP.    # GERD: Symptoms controlled on every other day PPI and daily H2 blocker.    # SHEELA: Patient doesn't appear to have any symptoms, but has not been able to tolerate CPAP.    # Medical Compliance: Yes     # COVID-19 Virus Review: Discussed COVID-19 virus and the potential medical risks.  Reviewed preventative health recommendations, which includes washing hands for 20 seconds, avoid touching your face, and social distancing.  Asked patient to inform the transplant center if they are exposed or diagnosed with this virus.    # Transplant History:  Etiology of Kidney Failure: Diabetes mellitus type 2  Tx: LDKT and Heart Tx  Transplant: 12/19/2019 (Kidney), 6/14/2018 (Heart)  Donor Type: Living Donor Class:   Crossmatch at time of Tx: negative  DSA at time of Tx: No  Significant changes in immunosuppression: None  CMV IgG Ab High Risk Discordance (D+/R-): No  EBV IgG Ab High Risk Discordance (D+/R-): No  Significant transplant-related complications: None    Transplant  "Office Phone Number: [phone]    Assessment and plan was discussed with the patient and he voiced his understanding and agreement.    Return visit: Return in about 3 months (around 9/22/2020).    Giovany Quijano MD    Chief Complaint   Mr. Nicholson is a 65 year old here for kidney transplant and immunosuppression management.     History of Present Illness    Mr. Nicholson reports feeling good overall with some medical complaints.  Since last clinic visit, patient reports no hospitalizations or new medical complaints and has been doing well overall.  His energy level is good and is now pretty much normal.  He is active and gets some exercise, mostly with walking.  Denies any chest pain or shortness of breath with exertion.  Appetite is \"too good\" and his weight has been up ~ 5 lbs.  No nausea, vomiting or diarrhea.  His heartburn symptoms are controlled with taking every other day PPI and daily H2 blocker.  No fever, sweats or chills.  No leg swelling.    Recent Hospitalizations:  [x] No [] Yes    New Medical Issues: [x] No [] Yes    Decreased energy: [x] No [] Yes    Chest pain or SOB with exertion:  [x] No [] Yes    Appetite change or weight change: [] No [x] Yes Weight is up ~ 5 lbs   Nausea, vomiting or diarrhea:  [x] No [] Yes    Fever, sweats or chills: [x] No [] Yes    Leg swelling: [x] No [] Yes      Home BP: 110-120/80-90s    Review of Systems   A comprehensive review of systems was obtained and negative, except as noted in the HPI or PMH.    Problem List   Patient Active Problem List   Diagnosis     Esophageal reflux     Allergic rhinitis     Anemia in chronic renal disease     Coronary artery disease involving native artery of transplanted heart without angina pectoris     Dyslipidemia     MGUS (monoclonal gammopathy of unknown significance)     Ascending aortic aneurysm (H)     Sigmoid diverticulitis     Heart replaced by transplant (H)     Aortic stenosis     Status post coronary angiogram     " Immunosuppression (H)     Type 2 diabetes mellitus (H)     Pancreatic cyst     Kidney replaced by transplant     Aftercare following organ transplant     HTN, kidney transplant related     Secondary renal hyperparathyroidism (H)     Vitamin D deficiency     Hypomagnesemia     SHEELA on CPAP     Need for pneumocystis prophylaxis       Social History   Social History     Tobacco Use     Smoking status: Former Smoker     Packs/day: 1.00     Years: 20.00     Pack years: 20.00     Types: Cigarettes     Start date: 1984     Last attempt to quit: 1994     Years since quittin.8     Smokeless tobacco: Never Used   Substance Use Topics     Alcohol use: No     Alcohol/week: 0.0 standard drinks     Drug use: No       Allergies   Allergies   Allergen Reactions     Norco [Hydrocodone-Acetaminophen] Nausea and Vomiting     Cats      Throat tightness     Isosorbide Other (See Comments)     hypotension     Penicillins Hives     Seasonal Allergies      rhinitis     Shrimp      Throat closes        Medications   Current Outpatient Medications   Medication Sig     ACCU-CHEK GUIDE test strip USE TO TEST BLOOD SUGAR 2 TO 3 TIMES DAILY OR AS DIRECTED     acetaminophen (TYLENOL) 325 MG tablet Take 2 tablets (650 mg) by mouth every 4 hours as needed for other (multimodal surgical pain management along with NSAIDS and opioid medication as indicated based on pain control and physical function.)     aspirin 81 MG EC tablet Take 81 mg by mouth daily     atorvastatin (LIPITOR) 40 MG tablet Take 1 tablet (40 mg) by mouth daily     blood glucose monitoring (ACCU-CHEK FASTCLIX) lancets Use to test blood sugar 2 times daily or as directed.     famotidine (PEPCID) 20 MG tablet Take 1 tablet (20 mg) by mouth 2 times daily     fluticasone (FLONASE) 50 MCG/ACT nasal spray Spray 1 spray into both nostrils daily (Patient taking differently: Spray 1 spray into both nostrils daily as needed )     insulin glargine (LANTUS PEN) 100 UNIT/ML pen  Inject 10 Units Subcutaneous every morning Or as directed     insulin pen needle (B-D U/F) 31G X 5 MM miscellaneous Use 1 daily as directed.     magnesium oxide (MAG-OX) 400 MG tablet Total dose = 800 mg at lunch time and 400 mg at dinner time     metFORMIN (GLUCOPHAGE-XR) 500 MG 24 hr tablet Take 4 tablets (2,000 mg) by mouth daily (with breakfast)     Multiple Vitamin (DAILY-DONY) TABS TAKE 1 TABLET BY MOUTH DAILY     mycophenolate (GENERIC EQUIVALENT) 250 MG capsule Take 3 capsules (750 mg) by mouth 2 times daily     sodium bicarbonate 650 MG tablet Take 2 tablets (1,300 mg) by mouth daily     sulfamethoxazole-trimethoprim (BACTRIM/SEPTRA) 400-80 MG tablet Take 1 tablet by mouth daily     tacrolimus (GENERIC EQUIVALENT) 0.5 MG capsule HOLD     tacrolimus (GENERIC EQUIVALENT) 1 MG capsule Take 1 capsule (1 mg) by mouth 2 times daily     Vitamin D3 (CHOLECALCIFEROL) 25 mcg (1000 units) tablet Take 1 tablet (25 mcg) by mouth daily     No current facility-administered medications for this visit.      Facility-Administered Medications Ordered in Other Visits   Medication     diatrizoate meglumine-sodium (GASTROGRAFIN/GASTROVIEW) 66-10 % solution 480 mL     Medications Discontinued During This Encounter   Medication Reason     melatonin 1 MG TABS tablet        Physical Exam   Vital Signs: /85   Pulse 92   Temp 95.7  F (35.4  C)   Wt 77.6 kg (171 lb 1.2 oz)   SpO2 99%   BMI 25.45 kg/m      GENERAL APPEARANCE: alert and no distress  HENT: no obvious abnormalities on appearance  RESP: breathing appears unremarkable with normal rate, no audible wheezing or cough and no apparent shortness of breath with conversation  MS: extremities normal - no gross deformities noted, no evidence of inflammation in joints, no muscle tenderness  SKIN: no apparent rash and normal skin tone  NEURO: speech is clear with no obvious neurological deficits  PSYCH: mentation appears normal and affect normal    Data     Renal Latest Ref  Rng & Units 6/15/2020 6/1/2020 5/18/2020   Na 133 - 144 mmol/L 138 136 137   K 3.4 - 5.3 mmol/L 4.1 4.1 4.1   Cl 94 - 109 mmol/L 105 106 106   CO2 20 - 32 mmol/L 24 20 23   BUN 7 - 30 mg/dL 18 22 22   Cr 0.66 - 1.25 mg/dL 1.05 1.14 1.24   Glucose 70 - 99 mg/dL 154(H) 153(H) 139(H)   Ca  8.5 - 10.1 mg/dL 9.1 9.4 9.6   Mg 1.6 - 2.3 mg/dL - 1.4(L) -     Bone Health Latest Ref Rng & Units 6/1/2020 5/4/2020 4/13/2020   Phos 2.5 - 4.5 mg/dL 3.5 3.7 -   PTHi 18 - 80 pg/mL - - 149(H)   Vit D Def 20 - 75 ug/L - - -     Heme Latest Ref Rng & Units 6/15/2020 6/1/2020 5/18/2020   WBC 4.0 - 11.0 10e9/L 4.1 4.3 4.3   Hgb 13.3 - 17.7 g/dL 12.9(L) 13.1(L) 12.9(L)   Plt 150 - 450 10e9/L 200 188 195   ABSOLUTE NEUTROPHIL 1.6 - 8.3 10e9/L - - -   ABSOLUTE LYMPHOCYTES 0.8 - 5.3 10e9/L - - -   ABSOLUTE MONOCYTES 0.0 - 1.3 10e9/L - - -   ABSOLUTE EOSINOPHILS 0.0 - 0.7 10e9/L - - -   ABSOLUTE BASOPHILS 0.0 - 0.2 10e9/L - - -   ABS IMMATURE GRANULOCYTES 0 - 0.4 10e9/L - - -   ABSOLUTE NUCLEATED RBC - - - -     Liver Latest Ref Rng & Units 12/24/2019 12/10/2019 10/28/2019   AP 40 - 150 U/L 278(H) 372(H) 318(H)   TBili 0.2 - 1.3 mg/dL 1.1 1.0 0.7   DBili 0.0 - 0.2 mg/dL 0.2 - -   ALT 0 - 70 U/L 18 16 14   AST 0 - 45 U/L 15 11 11   Tot Protein 6.8 - 8.8 g/dL 7.1 7.6 7.6   Albumin 3.4 - 5.0 g/dL 3.8 4.0 3.8     Pancreas Latest Ref Rng & Units 12/24/2019 12/20/2019 12/10/2019   A1C 0 - 5.6 % 4.5 4.3 5.2   Amylase 30 - 110 U/L - - -     Iron studies Latest Ref Rng & Units 12/10/2019 6/10/2019 7/10/2018   Iron 35 - 180 ug/dL 84 118 66   Iron sat 15 - 46 % 35 47(H) 28   Ferritin 26 - 388 ng/mL 445(H) 644(H) 771(H)     [facility] Txp Virology Latest Ref Rng & Units 6/1/2020 5/18/2020 5/4/2020   CVM DNA Quant - - - -   CMV QUANT IU/ML CMVND:CMV DNA Not Detected [IU]/mL - - -   LOG IU/ML OF CMVQNT <2.1 [Log:IU]/mL - - -   BK Spec - Plasma Plasma, EDTA anticoagulant Plasma   BK Res BKNEG:BK Virus DNA Not Detected copies/mL BK Virus DNA Not Detected BK Virus  DNA Not Detected BK Virus DNA Not Detected   BK Log <2.7 Log copies/mL Not Calculated Not Calculated Not Calculated   EBV CAPSID ANTIBODY IGG 0.0 - 0.8 AI - - -   EBV DNA COPIES/ML EBVNEG:EBV DNA Not Detected [Copies]/mL - - -   EBV DNA LOG OF COPIES <2.7 [Log:copies]/mL - - -   Hep B Core NR:Nonreactive - - -        Recent Labs   Lab Test 05/18/20  0936 06/01/20  0938 06/15/20  0914   DOSTAC 925P 5/17/2020 2200,5/31 06/14/20 930PM   TACROL 13.0 12.8 6.4     Recent Labs   Lab Test 12/26/19  0720 01/27/20  0918 02/03/20  0948   DOSMPA Not Provided NTP 02/02/20 0915pm   MPACID 1.65 2.39 2.41   MPAG 58.9 54.6 50.4       Again, thank you for allowing me to participate in the care of your patient.      Sincerely,    Early Post Transplant

## 2020-06-22 NOTE — PROGRESS NOTES
Post Transplant Patient Social Work Assessment -Outpatient    Patient Name: Murray Nicholson  : 1955  Age: 65 year old  MRN: [id number]  Date of transplant: Kidney 2019, Heart 2018    Patient known to this writer from follow up in the kidney transplant program. Telephone visit completed with patient today to update assessment.      Presenting Information   Living Situation: Lives alone in an apartment in [place]  Functional Status: Independent with ADL's, drives  Cultural/Language/Spiritual Considerations:  Male    Support System  Primary Support Person Friend, Naresh  Other support:  2 Adult Sons, Jasper and Jordi    Health Care Directive  Decision Maker: Patient  Alternate Decision Maker: Son, Jasper, then other son Jordi  Health Care Directive: Copy in Chart    Mental Health/Coping:   History of Mental Health: Denied  History of Chemical Health: Denied  Current status: Patient denied any current mental health or chemical health concerns. Pt stated even with COVID-19 he is doing well.  Coping: Watches TV, listens to music, take walks  Services Needed/Recommended: None identified at this time    Financial   Income: Social Security  Impact of transplant on income: None identified at this time  Insurance and medication coverage: Medicare, Cigna Supplement and Aetna Part D  Financial concerns: None identified at this time  Resources needed: None identified at this time      Education provided by SW: Social Work role outpatient setting and post-transplant expectations.    Assessment and recommendations and plan:  Pt had a heart transplant 2 years ago. He was on dialysis prior to transplant. Pt reported he takes his medications as prescribed and attends appointments/labs as scheduled. Reviewed post-transplant expectations as well as psychosocial risks of transplant. Patient seemed to process information well via telephone. Pt had no questions or concerns for this writer.     Sil Perez,  LICSW    Kidney/Pancreas/Auto Islet Transplant Programs

## 2020-06-23 DIAGNOSIS — Z11.59 ENCOUNTER FOR SCREENING FOR OTHER VIRAL DISEASES: Primary | ICD-10-CM

## 2020-06-28 PROBLEM — D72.819 LEUKOPENIA: Status: RESOLVED | Noted: 2018-07-11 | Resolved: 2020-06-28

## 2020-06-28 PROBLEM — Z29.89 NEED FOR PNEUMOCYSTIS PROPHYLAXIS: Status: ACTIVE | Noted: 2020-06-28

## 2020-06-29 DIAGNOSIS — Z94.0 KIDNEY REPLACED BY TRANSPLANT: ICD-10-CM

## 2020-06-29 DIAGNOSIS — Z79.899 LONG TERM USE OF DRUG: ICD-10-CM

## 2020-06-29 LAB
ALBUMIN SERPL-MCNC: 3.8 G/DL (ref 3.4–5)
ALP SERPL-CCNC: 159 U/L (ref 40–150)
ALT SERPL W P-5'-P-CCNC: 32 U/L (ref 0–70)
ANION GAP SERPL CALCULATED.3IONS-SCNC: 5 MMOL/L (ref 3–14)
AST SERPL W P-5'-P-CCNC: 24 U/L (ref 0–45)
BILIRUB DIRECT SERPL-MCNC: 0.2 MG/DL (ref 0–0.2)
BILIRUB SERPL-MCNC: 1 MG/DL (ref 0.2–1.3)
BUN SERPL-MCNC: 15 MG/DL (ref 7–30)
CALCIUM SERPL-MCNC: 8.9 MG/DL (ref 8.5–10.1)
CHLORIDE SERPL-SCNC: 106 MMOL/L (ref 94–109)
CHOLEST SERPL-MCNC: 104 MG/DL
CO2 SERPL-SCNC: 24 MMOL/L (ref 20–32)
CREAT SERPL-MCNC: 1.02 MG/DL (ref 0.66–1.25)
ERYTHROCYTE [DISTWIDTH] IN BLOOD BY AUTOMATED COUNT: 12.7 % (ref 10–15)
GFR SERPL CREATININE-BSD FRML MDRD: 77 ML/MIN/{1.73_M2}
GLUCOSE SERPL-MCNC: 116 MG/DL (ref 70–99)
HBA1C MFR BLD: 6.1 % (ref 0–5.6)
HCT VFR BLD AUTO: 40.9 % (ref 40–53)
HDLC SERPL-MCNC: 43 MG/DL
HGB BLD-MCNC: 13.5 G/DL (ref 13.3–17.7)
LDLC SERPL CALC-MCNC: 27 MG/DL
MAGNESIUM SERPL-MCNC: 1.6 MG/DL (ref 1.6–2.3)
MCH RBC QN AUTO: 31.2 PG (ref 26.5–33)
MCHC RBC AUTO-ENTMCNC: 33 G/DL (ref 31.5–36.5)
MCV RBC AUTO: 95 FL (ref 78–100)
NONHDLC SERPL-MCNC: 61 MG/DL
PHOSPHATE SERPL-MCNC: 3.5 MG/DL (ref 2.5–4.5)
PLATELET # BLD AUTO: 195 10E9/L (ref 150–450)
POTASSIUM SERPL-SCNC: 4.1 MMOL/L (ref 3.4–5.3)
PROT SERPL-MCNC: 7.5 G/DL (ref 6.8–8.8)
RBC # BLD AUTO: 4.33 10E12/L (ref 4.4–5.9)
SODIUM SERPL-SCNC: 135 MMOL/L (ref 133–144)
TACROLIMUS BLD-MCNC: 9.2 UG/L (ref 5–15)
TME LAST DOSE: NORMAL H
TRIGL SERPL-MCNC: 166 MG/DL
URATE SERPL-MCNC: 6.8 MG/DL (ref 3.5–7.2)
WBC # BLD AUTO: 3.5 10E9/L (ref 4–11)

## 2020-06-29 PROCEDURE — 87799 DETECT AGENT NOS DNA QUANT: CPT | Performed by: INTERNAL MEDICINE

## 2020-06-29 PROCEDURE — 80197 ASSAY OF TACROLIMUS: CPT | Performed by: INTERNAL MEDICINE

## 2020-06-30 DIAGNOSIS — R09.89 RUNNY NOSE: ICD-10-CM

## 2020-06-30 RX ORDER — FLUTICASONE PROPIONATE 50 MCG
1 SPRAY, SUSPENSION (ML) NASAL DAILY
Qty: 11.1 ML | Refills: 0 | Status: SHIPPED | OUTPATIENT
Start: 2020-06-30 | End: 2021-02-10

## 2020-06-30 NOTE — RESULT ENCOUNTER NOTE
Pt called with labs. Tac level 9.2 - last level same dose 6.4; no dose change, recheck 7/15 prior angio.   States glucose # better since starting metformin.  Heart transplant annual testing 7/15; will have COVID testing prior.  Pt verbalized understanding of next steps.

## 2020-07-06 DIAGNOSIS — Z94.0 KIDNEY REPLACED BY TRANSPLANT: ICD-10-CM

## 2020-07-06 DIAGNOSIS — E87.20 METABOLIC ACIDOSIS: ICD-10-CM

## 2020-07-06 RX ORDER — SODIUM BICARBONATE 650 MG/1
1300 TABLET ORAL DAILY
Qty: 120 TABLET | Refills: 2 | Status: SHIPPED | OUTPATIENT
Start: 2020-07-06 | End: 2021-08-19

## 2020-07-10 DIAGNOSIS — Z12.5 SPECIAL SCREENING FOR MALIGNANT NEOPLASM OF PROSTATE: ICD-10-CM

## 2020-07-10 DIAGNOSIS — Z94.1 HEART REPLACED BY TRANSPLANT (H): Primary | ICD-10-CM

## 2020-07-10 DIAGNOSIS — Z79.899 ENCOUNTER FOR LONG-TERM (CURRENT) USE OF OTHER MEDICATIONS: ICD-10-CM

## 2020-07-10 DIAGNOSIS — E10.29 DM (DIABETES MELLITUS) TYPE I CONTROLLED WITH RENAL MANIFESTATION (H): ICD-10-CM

## 2020-07-10 RX ORDER — POTASSIUM CHLORIDE 1500 MG/1
40 TABLET, EXTENDED RELEASE ORAL
Status: CANCELLED | OUTPATIENT
Start: 2020-07-10

## 2020-07-10 RX ORDER — POTASSIUM CHLORIDE 1500 MG/1
20 TABLET, EXTENDED RELEASE ORAL
Status: CANCELLED | OUTPATIENT
Start: 2020-07-10

## 2020-07-10 RX ORDER — SODIUM CHLORIDE 9 MG/ML
INJECTION, SOLUTION INTRAVENOUS CONTINUOUS
Status: CANCELLED | OUTPATIENT
Start: 2020-07-10

## 2020-07-12 PROCEDURE — U0003 INFECTIOUS AGENT DETECTION BY NUCLEIC ACID (DNA OR RNA); SEVERE ACUTE RESPIRATORY SYNDROME CORONAVIRUS 2 (SARS-COV-2) (CORONAVIRUS DISEASE [COVID-19]), AMPLIFIED PROBE TECHNIQUE, MAKING USE OF HIGH THROUGHPUT TECHNOLOGIES AS DESCRIBED BY CMS-2020-01-R: HCPCS | Performed by: INTERNAL MEDICINE

## 2020-07-14 ENCOUNTER — TELEPHONE (OUTPATIENT)
Dept: TRANSPLANT | Facility: CLINIC | Age: 65
End: 2020-07-14

## 2020-07-14 ENCOUNTER — TELEPHONE (OUTPATIENT)
Dept: CARDIOLOGY | Facility: CLINIC | Age: 65
End: 2020-07-14

## 2020-07-14 NOTE — TELEPHONE ENCOUNTER
Pt called to confirm he will need a  post angiogram.     Also discussed need for fasting labs with 12-hour tac, no caffeine 12-hours prior MRI, and to hold Metformin day of procedure and 48-hours after.    Pt verbalized understanding, enc to call with further questions.

## 2020-07-15 ENCOUNTER — RESULTS ONLY (OUTPATIENT)
Dept: OTHER | Facility: CLINIC | Age: 65
End: 2020-07-15

## 2020-07-15 ENCOUNTER — APPOINTMENT (OUTPATIENT)
Dept: LAB | Facility: CLINIC | Age: 65
End: 2020-07-15
Attending: INTERNAL MEDICINE
Payer: MEDICARE

## 2020-07-15 ENCOUNTER — HOSPITAL ENCOUNTER (OUTPATIENT)
Dept: MRI IMAGING | Facility: CLINIC | Age: 65
End: 2020-07-15
Attending: INTERNAL MEDICINE | Admitting: INTERNAL MEDICINE
Payer: MEDICARE

## 2020-07-15 ENCOUNTER — HOSPITAL ENCOUNTER (OUTPATIENT)
Facility: CLINIC | Age: 65
Discharge: HOME | End: 2020-07-15
Attending: INTERNAL MEDICINE | Admitting: INTERNAL MEDICINE
Payer: MEDICARE

## 2020-07-15 ENCOUNTER — APPOINTMENT (OUTPATIENT)
Dept: MEDSURG UNIT | Facility: CLINIC | Age: 65
End: 2020-07-15
Attending: INTERNAL MEDICINE
Payer: MEDICARE

## 2020-07-15 ENCOUNTER — TELEPHONE (OUTPATIENT)
Dept: TRANSPLANT | Facility: CLINIC | Age: 65
End: 2020-07-15

## 2020-07-15 VITALS
HEART RATE: 81 BPM | WEIGHT: 173.72 LBS | SYSTOLIC BLOOD PRESSURE: 133 MMHG | TEMPERATURE: 97.8 F | BODY MASS INDEX: 25.84 KG/M2 | DIASTOLIC BLOOD PRESSURE: 85 MMHG | OXYGEN SATURATION: 100 % | RESPIRATION RATE: 16 BRPM

## 2020-07-15 VITALS
DIASTOLIC BLOOD PRESSURE: 77 MMHG | RESPIRATION RATE: 16 BRPM | HEART RATE: 87 BPM | SYSTOLIC BLOOD PRESSURE: 112 MMHG | OXYGEN SATURATION: 99 %

## 2020-07-15 DIAGNOSIS — Z12.5 SPECIAL SCREENING FOR MALIGNANT NEOPLASM OF PROSTATE: ICD-10-CM

## 2020-07-15 DIAGNOSIS — Z94.1 HEART REPLACED BY TRANSPLANT (H): ICD-10-CM

## 2020-07-15 DIAGNOSIS — Z79.899 ENCOUNTER FOR LONG-TERM (CURRENT) USE OF OTHER MEDICATIONS: ICD-10-CM

## 2020-07-15 DIAGNOSIS — E10.29 DM (DIABETES MELLITUS) TYPE I CONTROLLED WITH RENAL MANIFESTATION (H): ICD-10-CM

## 2020-07-15 LAB
ALBUMIN SERPL-MCNC: 3.9 G/DL (ref 3.4–5)
ALP SERPL-CCNC: 157 U/L (ref 40–150)
ALT SERPL W P-5'-P-CCNC: 34 U/L (ref 0–70)
ANION GAP SERPL CALCULATED.3IONS-SCNC: 10 MMOL/L (ref 3–14)
AST SERPL W P-5'-P-CCNC: 20 U/L (ref 0–45)
BILIRUB SERPL-MCNC: 1 MG/DL (ref 0.2–1.3)
BUN SERPL-MCNC: 26 MG/DL (ref 7–30)
CALCIUM SERPL-MCNC: 9.2 MG/DL (ref 8.5–10.1)
CHLORIDE SERPL-SCNC: 106 MMOL/L (ref 94–109)
CHOLEST SERPL-MCNC: 108 MG/DL
CK SERPL-CCNC: 179 U/L (ref 30–300)
CO2 SERPL-SCNC: 19 MMOL/L (ref 20–32)
CREAT SERPL-MCNC: 1.14 MG/DL (ref 0.66–1.25)
ERYTHROCYTE [DISTWIDTH] IN BLOOD BY AUTOMATED COUNT: 12.9 % (ref 10–15)
GFR SERPL CREATININE-BSD FRML MDRD: 67 ML/MIN/{1.73_M2}
GLUCOSE SERPL-MCNC: 112 MG/DL (ref 70–99)
HBA1C MFR BLD: 6.3 % (ref 0–5.6)
HCT VFR BLD AUTO: 39.5 % (ref 40–53)
HDLC SERPL-MCNC: 42 MG/DL
HGB BLD-MCNC: 12.9 G/DL (ref 13.3–17.7)
INTERPRETATION ECG - MUSE: NORMAL
LDLC SERPL CALC-MCNC: 23 MG/DL
MAGNESIUM SERPL-MCNC: 1.5 MG/DL (ref 1.6–2.3)
MCH RBC QN AUTO: 30.4 PG (ref 26.5–33)
MCHC RBC AUTO-ENTMCNC: 32.7 G/DL (ref 31.5–36.5)
MCV RBC AUTO: 93 FL (ref 78–100)
NONHDLC SERPL-MCNC: 66 MG/DL
PHOSPHATE SERPL-MCNC: 3.2 MG/DL (ref 2.5–4.5)
PLATELET # BLD AUTO: 181 10E9/L (ref 150–450)
POTASSIUM SERPL-SCNC: 4.1 MMOL/L (ref 3.4–5.3)
PROT SERPL-MCNC: 7.5 G/DL (ref 6.8–8.8)
PSA SERPL-ACNC: 1.64 UG/L (ref 0–4)
RBC # BLD AUTO: 4.24 10E12/L (ref 4.4–5.9)
SODIUM SERPL-SCNC: 134 MMOL/L (ref 133–144)
TACROLIMUS BLD-MCNC: 9.1 UG/L (ref 5–15)
TME LAST DOSE: NORMAL H
TRIGL SERPL-MCNC: 217 MG/DL
WBC # BLD AUTO: 4.1 10E9/L (ref 4–11)

## 2020-07-15 PROCEDURE — 93016 CV STRESS TEST SUPVJ ONLY: CPT | Performed by: INTERNAL MEDICINE

## 2020-07-15 PROCEDURE — 87799 DETECT AGENT NOS DNA QUANT: CPT | Performed by: INTERNAL MEDICINE

## 2020-07-15 PROCEDURE — 99152 MOD SED SAME PHYS/QHP 5/>YRS: CPT | Mod: 59 | Performed by: INTERNAL MEDICINE

## 2020-07-15 PROCEDURE — 40000556 ZZH STATISTIC PERIPHERAL IV START W US GUIDANCE

## 2020-07-15 PROCEDURE — 25500064 ZZH RX 255 OP 636: Performed by: INTERNAL MEDICINE

## 2020-07-15 PROCEDURE — 86832 HLA CLASS I HIGH DEFIN QUAL: CPT | Performed by: INTERNAL MEDICINE

## 2020-07-15 PROCEDURE — 93017 CV STRESS TEST TRACING ONLY: CPT

## 2020-07-15 PROCEDURE — 88350 IMFLUOR EA ADDL 1ANTB STN PX: CPT | Performed by: INTERNAL MEDICINE

## 2020-07-15 PROCEDURE — 93505 ENDOMYOCARDIAL BIOPSY: CPT | Mod: 26 | Performed by: INTERNAL MEDICINE

## 2020-07-15 PROCEDURE — 25000128 H RX IP 250 OP 636: Performed by: INTERNAL MEDICINE

## 2020-07-15 PROCEDURE — 83036 HEMOGLOBIN GLYCOSYLATED A1C: CPT | Performed by: INTERNAL MEDICINE

## 2020-07-15 PROCEDURE — 93018 CV STRESS TEST I&R ONLY: CPT | Performed by: INTERNAL MEDICINE

## 2020-07-15 PROCEDURE — 40000172 ZZH STATISTIC PROCEDURE PREP ONLY

## 2020-07-15 PROCEDURE — 25000125 ZZHC RX 250: Performed by: INTERNAL MEDICINE

## 2020-07-15 PROCEDURE — 27210794 ZZH OR GENERAL SUPPLY STERILE: Performed by: INTERNAL MEDICINE

## 2020-07-15 PROCEDURE — 75565 CARD MRI VELOC FLOW MAPPING: CPT | Mod: 26 | Performed by: INTERNAL MEDICINE

## 2020-07-15 PROCEDURE — 93010 ELECTROCARDIOGRAM REPORT: CPT | Mod: 76 | Performed by: INTERNAL MEDICINE

## 2020-07-15 PROCEDURE — 75563 CARD MRI W/STRESS IMG & DYE: CPT | Mod: 26 | Performed by: INTERNAL MEDICINE

## 2020-07-15 PROCEDURE — G0103 PSA SCREENING: HCPCS | Performed by: INTERNAL MEDICINE

## 2020-07-15 PROCEDURE — 40000065 ZZH STATISTIC EKG NON-CHARGEABLE

## 2020-07-15 PROCEDURE — 85027 COMPLETE CBC AUTOMATED: CPT | Performed by: INTERNAL MEDICINE

## 2020-07-15 PROCEDURE — 80197 ASSAY OF TACROLIMUS: CPT | Performed by: INTERNAL MEDICINE

## 2020-07-15 PROCEDURE — 88346 IMFLUOR 1ST 1ANTB STAIN PX: CPT | Performed by: INTERNAL MEDICINE

## 2020-07-15 PROCEDURE — C1894 INTRO/SHEATH, NON-LASER: HCPCS | Performed by: INTERNAL MEDICINE

## 2020-07-15 PROCEDURE — 86352 CELL FUNCTION ASSAY W/STIM: CPT | Performed by: INTERNAL MEDICINE

## 2020-07-15 PROCEDURE — 93005 ELECTROCARDIOGRAM TRACING: CPT

## 2020-07-15 PROCEDURE — A9585 GADOBUTROL INJECTION: HCPCS | Performed by: INTERNAL MEDICINE

## 2020-07-15 PROCEDURE — 25000132 ZZH RX MED GY IP 250 OP 250 PS 637: Mod: GY | Performed by: INTERNAL MEDICINE

## 2020-07-15 PROCEDURE — 82550 ASSAY OF CK (CPK): CPT | Performed by: INTERNAL MEDICINE

## 2020-07-15 PROCEDURE — 84100 ASSAY OF PHOSPHORUS: CPT | Performed by: INTERNAL MEDICINE

## 2020-07-15 PROCEDURE — 99153 MOD SED SAME PHYS/QHP EA: CPT | Performed by: INTERNAL MEDICINE

## 2020-07-15 PROCEDURE — 83735 ASSAY OF MAGNESIUM: CPT | Performed by: INTERNAL MEDICINE

## 2020-07-15 PROCEDURE — 36415 COLL VENOUS BLD VENIPUNCTURE: CPT | Performed by: INTERNAL MEDICINE

## 2020-07-15 PROCEDURE — 80053 COMPREHEN METABOLIC PANEL: CPT | Performed by: INTERNAL MEDICINE

## 2020-07-15 PROCEDURE — 99152 MOD SED SAME PHYS/QHP 5/>YRS: CPT | Performed by: INTERNAL MEDICINE

## 2020-07-15 PROCEDURE — 93505 ENDOMYOCARDIAL BIOPSY: CPT | Mod: XU | Performed by: INTERNAL MEDICINE

## 2020-07-15 PROCEDURE — 93456 R HRT CORONARY ARTERY ANGIO: CPT | Performed by: INTERNAL MEDICINE

## 2020-07-15 PROCEDURE — 93454 CORONARY ARTERY ANGIO S&I: CPT | Mod: 26 | Performed by: INTERNAL MEDICINE

## 2020-07-15 PROCEDURE — 88307 TISSUE EXAM BY PATHOLOGIST: CPT | Performed by: INTERNAL MEDICINE

## 2020-07-15 PROCEDURE — 75565 CARD MRI VELOC FLOW MAPPING: CPT

## 2020-07-15 PROCEDURE — 80061 LIPID PANEL: CPT | Performed by: INTERNAL MEDICINE

## 2020-07-15 PROCEDURE — 86833 HLA CLASS II HIGH DEFIN QUAL: CPT | Performed by: INTERNAL MEDICINE

## 2020-07-15 RX ORDER — METHYLPREDNISOLONE SODIUM SUCCINATE 125 MG/2ML
125 INJECTION, POWDER, LYOPHILIZED, FOR SOLUTION INTRAMUSCULAR; INTRAVENOUS
Status: DISCONTINUED | OUTPATIENT
Start: 2020-07-15 | End: 2020-07-16 | Stop reason: HOSPADM

## 2020-07-15 RX ORDER — FLUMAZENIL 0.1 MG/ML
0.2 INJECTION, SOLUTION INTRAVENOUS
Status: DISCONTINUED | OUTPATIENT
Start: 2020-07-15 | End: 2020-07-15 | Stop reason: HOSPADM

## 2020-07-15 RX ORDER — ARGATROBAN 1 MG/ML
4.51 INJECTION, SOLUTION INTRAVENOUS
Status: DISCONTINUED | OUTPATIENT
Start: 2020-07-15 | End: 2020-07-15 | Stop reason: HOSPADM

## 2020-07-15 RX ORDER — ATROPINE SULFATE 0.1 MG/ML
0.5 INJECTION INTRAVENOUS EVERY 5 MIN PRN
Status: DISCONTINUED | OUTPATIENT
Start: 2020-07-15 | End: 2020-07-15 | Stop reason: HOSPADM

## 2020-07-15 RX ORDER — IOPAMIDOL 755 MG/ML
INJECTION, SOLUTION INTRAVASCULAR
Status: DISCONTINUED | OUTPATIENT
Start: 2020-07-15 | End: 2020-07-15 | Stop reason: HOSPADM

## 2020-07-15 RX ORDER — DIPHENHYDRAMINE HYDROCHLORIDE 50 MG/ML
25-50 INJECTION INTRAMUSCULAR; INTRAVENOUS
Status: DISCONTINUED | OUTPATIENT
Start: 2020-07-15 | End: 2020-07-16 | Stop reason: HOSPADM

## 2020-07-15 RX ORDER — NALOXONE HYDROCHLORIDE 0.4 MG/ML
.2-.4 INJECTION, SOLUTION INTRAMUSCULAR; INTRAVENOUS; SUBCUTANEOUS
Status: DISCONTINUED | OUTPATIENT
Start: 2020-07-15 | End: 2020-07-15 | Stop reason: HOSPADM

## 2020-07-15 RX ORDER — NITROGLYCERIN 20 MG/100ML
10-200 INJECTION INTRAVENOUS CONTINUOUS PRN
Status: DISCONTINUED | OUTPATIENT
Start: 2020-07-15 | End: 2020-07-15 | Stop reason: HOSPADM

## 2020-07-15 RX ORDER — ALBUTEROL SULFATE 90 UG/1
2 AEROSOL, METERED RESPIRATORY (INHALATION) EVERY 5 MIN PRN
Status: DISCONTINUED | OUTPATIENT
Start: 2020-07-15 | End: 2020-07-16 | Stop reason: HOSPADM

## 2020-07-15 RX ORDER — GADOBUTROL 604.72 MG/ML
10 INJECTION INTRAVENOUS ONCE
Status: COMPLETED | OUTPATIENT
Start: 2020-07-15 | End: 2020-07-15

## 2020-07-15 RX ORDER — LIDOCAINE 40 MG/G
CREAM TOPICAL
Status: DISCONTINUED | OUTPATIENT
Start: 2020-07-15 | End: 2020-07-15 | Stop reason: HOSPADM

## 2020-07-15 RX ORDER — DIPHENHYDRAMINE HCL 25 MG
25 CAPSULE ORAL
Status: DISCONTINUED | OUTPATIENT
Start: 2020-07-15 | End: 2020-07-16 | Stop reason: HOSPADM

## 2020-07-15 RX ORDER — ACYCLOVIR 200 MG/1
0-1 CAPSULE ORAL
Status: DISCONTINUED | OUTPATIENT
Start: 2020-07-15 | End: 2020-07-16 | Stop reason: HOSPADM

## 2020-07-15 RX ORDER — EPTIFIBATIDE 2 MG/ML
2 INJECTION, SOLUTION INTRAVENOUS CONTINUOUS PRN
Status: DISCONTINUED | OUTPATIENT
Start: 2020-07-15 | End: 2020-07-15 | Stop reason: HOSPADM

## 2020-07-15 RX ORDER — DIAZEPAM 5 MG
5 TABLET ORAL EVERY 30 MIN PRN
Status: DISCONTINUED | OUTPATIENT
Start: 2020-07-15 | End: 2020-07-16 | Stop reason: HOSPADM

## 2020-07-15 RX ORDER — PANTOPRAZOLE SODIUM 20 MG/1
40 TABLET, DELAYED RELEASE ORAL EVERY OTHER DAY
COMMUNITY
End: 2020-11-11

## 2020-07-15 RX ORDER — FENTANYL CITRATE 50 UG/ML
INJECTION, SOLUTION INTRAMUSCULAR; INTRAVENOUS
Status: DISCONTINUED | OUTPATIENT
Start: 2020-07-15 | End: 2020-07-15 | Stop reason: HOSPADM

## 2020-07-15 RX ORDER — POTASSIUM CHLORIDE 750 MG/1
40 TABLET, EXTENDED RELEASE ORAL
Status: DISCONTINUED | OUTPATIENT
Start: 2020-07-15 | End: 2020-07-15 | Stop reason: HOSPADM

## 2020-07-15 RX ORDER — ONDANSETRON 2 MG/ML
4 INJECTION INTRAMUSCULAR; INTRAVENOUS
Status: DISCONTINUED | OUTPATIENT
Start: 2020-07-15 | End: 2020-07-16 | Stop reason: HOSPADM

## 2020-07-15 RX ORDER — HEPARIN SODIUM 10000 [USP'U]/100ML
100-1000 INJECTION, SOLUTION INTRAVENOUS CONTINUOUS PRN
Status: DISCONTINUED | OUTPATIENT
Start: 2020-07-15 | End: 2020-07-15 | Stop reason: HOSPADM

## 2020-07-15 RX ORDER — DOBUTAMINE HYDROCHLORIDE 200 MG/100ML
2-20 INJECTION INTRAVENOUS CONTINUOUS PRN
Status: DISCONTINUED | OUTPATIENT
Start: 2020-07-15 | End: 2020-07-15 | Stop reason: HOSPADM

## 2020-07-15 RX ORDER — POTASSIUM CHLORIDE 750 MG/1
20 TABLET, EXTENDED RELEASE ORAL
Status: DISCONTINUED | OUTPATIENT
Start: 2020-07-15 | End: 2020-07-15 | Stop reason: HOSPADM

## 2020-07-15 RX ORDER — EPTIFIBATIDE 2 MG/ML
180 INJECTION, SOLUTION INTRAVENOUS EVERY 10 MIN PRN
Status: DISCONTINUED | OUTPATIENT
Start: 2020-07-15 | End: 2020-07-15 | Stop reason: HOSPADM

## 2020-07-15 RX ORDER — FENTANYL CITRATE 50 UG/ML
25-50 INJECTION, SOLUTION INTRAMUSCULAR; INTRAVENOUS
Status: DISCONTINUED | OUTPATIENT
Start: 2020-07-15 | End: 2020-07-15 | Stop reason: HOSPADM

## 2020-07-15 RX ORDER — SODIUM CHLORIDE 9 MG/ML
INJECTION, SOLUTION INTRAVENOUS CONTINUOUS
Status: DISCONTINUED | OUTPATIENT
Start: 2020-07-15 | End: 2020-07-15 | Stop reason: HOSPADM

## 2020-07-15 RX ORDER — NALOXONE HYDROCHLORIDE 0.4 MG/ML
.1-.4 INJECTION, SOLUTION INTRAMUSCULAR; INTRAVENOUS; SUBCUTANEOUS
Status: DISCONTINUED | OUTPATIENT
Start: 2020-07-15 | End: 2020-07-15 | Stop reason: HOSPADM

## 2020-07-15 RX ORDER — REGADENOSON 0.08 MG/ML
0.4 INJECTION, SOLUTION INTRAVENOUS ONCE
Status: COMPLETED | OUTPATIENT
Start: 2020-07-15 | End: 2020-07-15

## 2020-07-15 RX ORDER — AMINOPHYLLINE 25 MG/ML
100 INJECTION, SOLUTION INTRAVENOUS ONCE
Status: COMPLETED | OUTPATIENT
Start: 2020-07-15 | End: 2020-07-15

## 2020-07-15 RX ORDER — DOPAMINE HYDROCHLORIDE 160 MG/100ML
2-20 INJECTION, SOLUTION INTRAVENOUS CONTINUOUS PRN
Status: DISCONTINUED | OUTPATIENT
Start: 2020-07-15 | End: 2020-07-15 | Stop reason: HOSPADM

## 2020-07-15 RX ADMIN — ASPIRIN 325 MG: 325 TABLET, COATED ORAL at 13:09

## 2020-07-15 RX ADMIN — AMINOPHYLLINE 100 MG: 25 INJECTION, SOLUTION INTRAVENOUS at 10:51

## 2020-07-15 RX ADMIN — REGADENOSON 0.4 MG: 0.08 INJECTION, SOLUTION INTRAVENOUS at 10:39

## 2020-07-15 RX ADMIN — GADOBUTROL 10 ML: 604.72 INJECTION INTRAVENOUS at 11:30

## 2020-07-15 NOTE — PRE-PROCEDURE
GENERAL PRE-PROCEDURE:   Procedure:  Coronary angiogram, possible angioplasty and stent, possible IVUS, RHC/bx    Written consent obtained?: Yes    Risks and benefits: Risks, benefits and alternatives were discussed    Consent given by:  Patient  Patient states understanding of procedure being performed: Yes    Patient's understanding of procedure matches consent: Yes    Procedure consent matches procedure scheduled: Yes    Expected level of sedation:  Moderate  Appropriately NPO:  Yes  ASA Class:  Class 2- mild systemic disease, no acute problems, no functional limitations  Mallampati  :  Grade 3- soft palate visible, posterior pharyngeal wall not visible  Lungs:  Lungs clear with good breath sounds bilaterally  Heart:  Normal heart sounds and rate  History & Physical reviewed:  History and physical reviewed and no updates needed  Statement of review:  I have reviewed the lab findings, diagnostic data, medications, and the plan for sedation

## 2020-07-15 NOTE — DISCHARGE INSTRUCTIONS
Going Home after an Angiogram  ______________________________________________      After you go home:    Have an adult stay with you for 24 hours.    Drink plenty of fluids.    You may eat your normal diet, unless your doctor tells you otherwise.    For 24 hours:    Relax and take it easy.    Do NOT smoke.    Do NOT make any important or legal decisions.    Do NOT drive or operate machines at home or at work.    Do NOT drink alcohol.    Remove the Band-Aid after 24 hours. If there is minor oozing, apply another Band-aid and remove it after 12 hours.    For 2 days, do NOT have sex or do any heavy exercise.    Do NOT take a bath, or use a hot tub or pool for at least 3 days. You may shower.    Care of groin site  It is normal to have a small bruise or lump at the site.    Do not scrub the site.    For the first 2 days: Do not stoop or squat. When you cough, sneeze or move your bowels, hold your hand over the puncture site and press gently.    Do not lift more than 10 pounds for at least 3 to 5 days.    Do not use lotion or powder near the puncture site for 3 days.    If you start bleeding from the site in your groin, lie down flat and press firmly  on the site. Call your doctor as soon as you can.          Medicines    If you have started taking Plavix or Effient, do not stop taking it until you talk to your heart doctor (cardiologist).    If you are on metformin (Glucophage), do not restart it until you have blood tests (within 2 to 3 days after discharge). When your doctor tells you it is safe, you may restart the metformin.    If you have stopped any other medicines, check with your nurse or provider about when to restart them.    Call 911 right away if you have bleeding that is heavy or does not stop.    Call your doctor if:    You have a large or growing hard lump around the site.    The site is red, swollen, hot or tender.    Blood or fluid is draining from the site.    You have chills or a fever greater than  101 F (38 C).    Your leg or arm feels numb or cool.    You have hives, a rash or unusual itching.      [facility] [place]:  [phone] (7 days a week)

## 2020-07-15 NOTE — PROGRESS NOTES
Pt arrived for cardiac MRI with stress. Allergies, medications, and safety checklist reviewed with patient. Test explained and all questions were answered. Denies caffeine intake. Lungs are clear. Lexiscan 0.4 mg given over 10 seconds followed by 5 mL of saline. After stressing imagines, 100 mg of Aminophylline given. Pt tolerated the scan, medications, and contrast well. Pre and post EKG completed. Pt monitored after MRI and escorted back to [facility] waiting room.

## 2020-07-15 NOTE — Clinical Note
Potential access sites were evaluated for patency using ultrasound.   The right femoral artery and right femoral vein were selected. Access was obtained under with Sonosite and Fluoroscopic guidance using a standard 18 guage needle with direct visualization of needle entry.

## 2020-07-15 NOTE — TELEPHONE ENCOUNTER
Spoke with the patient and confirmed neprology appointments on 12/17/20.  Informed patient an itinerary can be accessed on [employer], pt will utilize [employer]

## 2020-07-16 ENCOUNTER — TELEPHONE (OUTPATIENT)
Dept: TRANSPLANT | Facility: CLINIC | Age: 65
End: 2020-07-16

## 2020-07-16 ENCOUNTER — PRE VISIT (OUTPATIENT)
Dept: TRANSPLANT | Facility: CLINIC | Age: 65
End: 2020-07-16

## 2020-07-16 DIAGNOSIS — Z94.1 HEART REPLACED BY TRANSPLANT (H): Primary | ICD-10-CM

## 2020-07-16 LAB
CMV DNA SPEC NAA+PROBE-ACNC: NORMAL [IU]/ML
CMV DNA SPEC NAA+PROBE-LOG#: NORMAL {LOG_IU}/ML
COPATH REPORT: NORMAL
EBV DNA # SPEC NAA+PROBE: NORMAL {COPIES}/ML
EBV DNA SPEC NAA+PROBE-LOG#: NORMAL {LOG_COPIES}/ML
INTERPRETATION ECG - MUSE: NORMAL
SPECIMEN SOURCE: NORMAL

## 2020-07-16 NOTE — TELEPHONE ENCOUNTER
"Reviewed available testing with pt. Tac level 9.1 on 1 mg BID. Immuknow level pending. No change at this time.      Plan to discuss cardiac MRI and angio further with Dr Ernst on Monday.    \"The distal ascending aorta (after the anastomosis) is mildly enlarged measuring 4.0 cm.\"   - follow up needed?      Pt scheduled to check BMP 7/17/2020 before restarting metformin.     Enc to call with further questions; clinic on 7/20.   "

## 2020-07-16 NOTE — PROGRESS NOTES
Discharge instruction reviewed.  Patient verbalized understanding. PIV removed, patient ambulated to the main lobby. Family awaiting infront of the hospital. Patient discharged

## 2020-07-17 DIAGNOSIS — Z94.1 HEART REPLACED BY TRANSPLANT (H): ICD-10-CM

## 2020-07-17 LAB
ANION GAP SERPL CALCULATED.3IONS-SCNC: 6 MMOL/L (ref 3–14)
BUN SERPL-MCNC: 17 MG/DL (ref 7–30)
CALCIUM SERPL-MCNC: 9.1 MG/DL (ref 8.5–10.1)
CHLORIDE SERPL-SCNC: 110 MMOL/L (ref 94–109)
CO2 SERPL-SCNC: 22 MMOL/L (ref 20–32)
CREAT SERPL-MCNC: 1.15 MG/DL (ref 0.66–1.25)
DONOR IDENTIFICATION: NORMAL
DONOR IDENTIFICATION: NORMAL
DSA COMMENTS: NORMAL
DSA COMMENTS: NORMAL
DSA PRESENT: NO
DSA PRESENT: NO
DSA TEST METHOD: NORMAL
DSA TEST METHOD: NORMAL
GFR SERPL CREATININE-BSD FRML MDRD: 66 ML/MIN/{1.73_M2}
GLUCOSE SERPL-MCNC: 243 MG/DL (ref 70–99)
IMMUKNOW IMMUNE CELL FUNCTION: NORMAL
ORGAN: NORMAL
ORGAN: NORMAL
POTASSIUM SERPL-SCNC: 3.9 MMOL/L (ref 3.4–5.3)
SA1 CELL: NORMAL
SA1 COMMENTS: NORMAL
SA1 HI RISK ABY: NORMAL
SA1 MOD RISK ABY: NORMAL
SA1 TEST METHOD: NORMAL
SA2 CELL: NORMAL
SA2 COMMENTS: NORMAL
SA2 HI RISK ABY UA: NORMAL
SA2 MOD RISK ABY: NORMAL
SA2 TEST METHOD: NORMAL
SODIUM SERPL-SCNC: 138 MMOL/L (ref 133–144)
UNACCEPTABLE ANTIGEN: NORMAL
UNOS CPRA: 0

## 2020-07-20 ENCOUNTER — OFFICE VISIT (OUTPATIENT)
Dept: CARDIOLOGY | Facility: CLINIC | Age: 65
End: 2020-07-20
Attending: INTERNAL MEDICINE
Payer: MEDICARE

## 2020-07-20 VITALS
HEART RATE: 89 BPM | OXYGEN SATURATION: 100 % | BODY MASS INDEX: 25.97 KG/M2 | SYSTOLIC BLOOD PRESSURE: 118 MMHG | DIASTOLIC BLOOD PRESSURE: 81 MMHG | WEIGHT: 174.6 LBS

## 2020-07-20 DIAGNOSIS — Z94.1 HEART REPLACED BY TRANSPLANT (H): Primary | ICD-10-CM

## 2020-07-20 PROCEDURE — 99214 OFFICE O/P EST MOD 30 MIN: CPT | Mod: ZP | Performed by: INTERNAL MEDICINE

## 2020-07-20 PROCEDURE — G0463 HOSPITAL OUTPT CLINIC VISIT: HCPCS | Mod: ZF

## 2020-07-20 RX ORDER — LORATADINE 10 MG/1
10 TABLET ORAL EVERY EVENING
COMMUNITY

## 2020-07-20 RX ORDER — BIOTIN 1 MG
1000 TABLET ORAL EVERY MORNING
COMMUNITY
End: 2023-11-03

## 2020-07-20 ASSESSMENT — PAIN SCALES - GENERAL: PAINLEVEL: NO PAIN (0)

## 2020-07-20 NOTE — LETTER
2020      RE: Murray Nicholson  [address]  [place] [address]       Dear Colleague,    Thank you for the opportunity to participate in the care of your patient, Murray Nicholson, at the [facility] at [facility]. Please see a copy of my visit note below.    Cardiology Clinic Note     Yahir Turcios MD    [facility]    [address] [place]  [address]       Ana Luisa Mcpherson MD    [address], [facility] [address]    [place]  [address]       RE: Murray Nicholson   MRN: [id number]   : 1955      Dear Dr. Turcios and Dr. Mcpherson:      We had the pleasure of seeing Mr. Murray Nicholson for followup in our Cardiac Transplant Clinic at the [facility].  As you know, he is a very pleasant 64-year-old male who underwent orthotopic heart transplantation on 2018 and kidney transplant in 2019. His current problem list includes:     1.  Status post orthotopic heart transplantation.   2.  Neutropenia  3.  Oral mucosal ulcer secondary to neutropenia with Klebsiella infection.   4.  Pseudomonas bacteremia, resolved.   5.  Perforation of the small bowel, status post small-bowel resection and ileostomy.   6.  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   7.  Hypertension.   8.  Hyperlipidemia.   9.  End-stage renal disease requiring hemodialysis - S/P Kidney transplant    He is returning today for his 2-year annual follow-up.  He had his kidney transplantation in 2019.  He is overall doing very well.  He has no specific complaints.  No recent hospitalizations or ER visits.  He is compliant with his medications.    Current Outpatient Medications   Medication Sig     ACCU-CHEK GUIDE test strip USE TO TEST BLOOD SUGAR 2 TO 3 TIMES DAILY OR AS DIRECTED     acetaminophen (TYLENOL) 325 MG tablet Take 2 tablets (650 mg) by mouth every 4 hours as needed for other (multimodal surgical pain  management along with NSAIDS and opioid medication as indicated based on pain control and physical function.)     aspirin 81 MG EC tablet Take 81 mg by mouth daily     atorvastatin (LIPITOR) 40 MG tablet Take 1 tablet (40 mg) by mouth daily     blood glucose monitoring (ACCU-CHEK FASTCLIX) lancets Use to test blood sugar 2 times daily or as directed.     famotidine (PEPCID) 20 MG tablet Take 1 tablet (20 mg) by mouth 2 times daily     fluticasone (FLONASE) 50 MCG/ACT nasal spray Spray 1 spray into both nostrils daily     insulin pen needle (B-D U/F) 31G X 5 MM miscellaneous Use 1 daily as directed.     magnesium oxide (MAG-OX) 400 MG tablet Total dose = 800 mg at lunch time and 400 mg at dinner time     metFORMIN (GLUCOPHAGE-XR) 500 MG 24 hr tablet Take 4 tablets (2,000 mg) by mouth daily (with breakfast)     Multiple Vitamin (DAILY-DONY) TABS TAKE 1 TABLET BY MOUTH DAILY     mycophenolate (GENERIC EQUIVALENT) 250 MG capsule Take 3 capsules (750 mg) by mouth 2 times daily     pantoprazole (PROTONIX) 20 MG EC tablet Take 40 mg by mouth every other day     sodium bicarbonate 650 MG tablet Take 2 tablets (1,300 mg) by mouth daily     sulfamethoxazole-trimethoprim (BACTRIM/SEPTRA) 400-80 MG tablet Take 1 tablet by mouth daily     tacrolimus (GENERIC EQUIVALENT) 0.5 MG capsule HOLD     tacrolimus (GENERIC EQUIVALENT) 1 MG capsule Take 1 capsule (1 mg) by mouth 2 times daily     Vitamin D3 (CHOLECALCIFEROL) 25 mcg (1000 units) tablet Take 1 tablet (25 mcg) by mouth daily     No current facility-administered medications for this visit.      Facility-Administered Medications Ordered in Other Visits   Medication     diatrizoate meglumine-sodium (GASTROGRAFIN/GASTROVIEW) 66-10 % solution 480 mL        REVIEW OF SYSTEMS:  A detailed 10-point review of systems was obtained as described in History of Present Illness.  All other systems are reviewed and are negative.      PHYSICAL EXAMINATION:   /81 (BP Location: Right arm,  Patient Position: Chair, Cuff Size: Adult Regular)   Pulse 89   Wt 79.2 kg (174 lb 9.7 oz)   SpO2 100%   BMI 25.97 kg/m    He was awake, alert, oriented x3.  He was in no apparent distress.  He was comfortable.  He had no pallor, cyanosis or jaundice.  He had no jugular venous distention.  His carotids were 2+ bilaterally.  Cardiac auscultation revealed normal S1 and S2 with no murmur rub or gallop.  Auscultation of his lungs revealed equal air entry on both sides with no added sounds.  His abdomen was soft with no guarding no tenderness no rigidity no guarding.  He had no focal neurological deficit.  His extremities showed no edema.    Testing:    EKG (07/2020)  Sinus rhythm  Normal ECG  When compared with ECG of 15-JUL-2020 09:46,  No significant change was found     Cardiac MRI (07/2020)  1. The LV is normal in cavity size and wall thickness. The global systolic function is normal. The LVEF is  63%. There are no regional wall motion abnormalities.     2. The RV is normal in cavity size. The global systolic function is normal. The RVEF is 63%.      3. Both atria are enlarged from transplantation.     4. There is no significant valvular disease.      5. Late gadolinium enhancement imaging shows no MI, fibrosis or infiltrative disease.      6. Regadenoson stress perfusion imaging shows no ischemia.     7. There is no pericardial effusion or thickening.     8. There is no intracardiac thrombus.     9. The distal ascending aorta (after the anastomosis) is mildly enlarged measuring 4.0 cm.      CONCLUSIONS: No myocardial ischemia or fibrosis. Normal cardiac function, LVEF 63% and RVEF 63%.     RHC (07/2020)  RA 6  RV 30/6  PA 28/12 (18)  PCWP 9  PA% 74  CO/CI 5.5/2.8  PVR 1.6 QUINONES    Biopsy (07/2020):  ISHLT 2004 cellular grade: 0R        - No histological evidence of acute cellular rejection   - ISHLT 2013 antibody-mediated grade: pAMR 0        - No histological features diagnostic of antibody-mediated rejection         - Focal (10%), weak capillary staining for C4d        - No detectable capillary staining for C3d   - Endocardial fibrosis   - See microscopic description and comment     Coronary Angiogram (07/2020):  Single vessel obstructive coronary artery disease of the OM2 with sequential 80% stenoses. This is unchanged from his prior angiogram 1 year ago and also from the angiogram 1 month after his OHT - likely donor disease as opposed to CAV      ALLOMAP: 37     DSA:  Lab Results   Component Value Date    SAITESTMET SA FCS 07/15/2020    SAICELL Class I 07/15/2020    [id number] None 07/15/2020    [id number] None 07/15/2020    SAIREPCOM  07/15/2020      Test performed by modified procedure. Serum heat inactivated and tested   by a modified ([place]) protocol including fetal calf serum addition.   High-risk, mfi >3,000. Mod-risk, mfi 500-3,000.       Lab Results   Component Value Date    SAIITESTME SA FCS 07/15/2020    SAIICELL Class II 07/15/2020    [id number] None 07/15/2020    [id number] None 07/15/2020    SAIIREPCOM  07/15/2020      Test performed by modified procedure. Serum heat inactivated and tested   by a modified ([place]) protocol including fetal calf serum addition.   High-risk, mfi >3,000. Mod-risk, mfi 500-3,000.         IMMUNOSUPPRESSANT LEVELS:  Lab Results   Component Value Date    TACROL 9.1 07/15/2020    DOSTAC Not Provided 07/15/2020       Lab Results   Component Value Date    CSPEC Plasma, EDTA anticoagulant 07/15/2020     CBC RESULTS:   Recent Labs   Lab Test 07/15/20  0900   WBC 4.1   RBC 4.24*   HGB 12.9*   HCT 39.5*   MCV 93   MCH 30.4   MCHC 32.7   RDW 12.9        Recent Labs   Lab Test 07/17/20  1008 07/15/20  0900    134   POTASSIUM 3.9 4.1   CHLORIDE 110* 106   CO2 22 19*   ANIONGAP 6 10   * 112*   BUN 17 26   CR 1.15 1.14   TRINI 9.1 9.2     Liver Function Studies -   Recent Labs   Lab Test 07/15/20  0900   PROTTOTAL 7.5   ALBUMIN 3.9   BILITOTAL 1.0   ALKPHOS 157*   AST 20   ALT 34      Recent Labs   Lab Test 07/15/20  0900 06/29/20  0954  08/10/15  0729 04/09/15  0900   CHOL 108 104   < > 198 182   HDL 42 43   < > 40* 46   LDL 23 27   < > Cannot estimate LDL when triglyceride exceeds 400 mg/dL Cannot estimate LDL when triglyceride exceeds 400 mg/dL  85   TRIG 217* 166*   < > 421* 426*   CHOLHDLRATIO  --   --   --  5.0 4.0    < > = values in this interval not displayed.     PSA   Date Value Ref Range Status   07/15/2020 1.64 0 - 4 ug/L Final     Comment:     Assay Method:  Chemiluminescence using Siemens Vista analyzer     TSH   Date Value Ref Range Status   04/16/2018 2.25 0.40 - 4.00 mU/L Final     CMV/EBV - negative    ASSESSMENT AND PLAN:    Mr. Murray Nicholson is a 65-year-old male status post orthotopic heart transplantation in June with a very complex postoperative course who returns today for followup.     Check CMV   CBC with Diff   CBC in One week  Decrease Cellcept to 500mg BID     #Orthotopic Heart Transplant (06/2018)     Graft Function: Normal. No evidence of cellular or antibody mediated rejection.     CAV PPx: Patient has a single obstructive coronary artery disease of the OM2 with sequential 80% stenoses. This was unchanged from his prior angiogram that was done 30 days after his transplant. Likely donor disease. Will continue ASA 81mg and Atorvastatin 40mg daily.     Immunosuppression: Continue Tacrolimus with a goal of 8-10 and  mg bid. Lower dose of Cellcept due to neutropenia in the past     #Hypertension. Off all antihypertensive medications after his renal transplant. BP under control. Will continue to monitor.  His blood pressure is currently controlled on hydralazine 100 mg 3 times a day, lisinopril 10 mg BID, Norvasc 10 mg, and Clonidine 0.1 mg once a week. . Given his elevated BP, we will stop the clonidine and increase Lisinopril 15mg BID.     #Kidney transplant     - followed by transplant nephrology  - on prophylactic Bactrim  - Renal function stable    # High  risk for CMV.  Negative    #Diabetes - On metformin and sliding scale insulin. Followed by endocrinology.       RTC in 6 months with echo and Allomap. Patient will call in the interim if he has any worsening symptoms.       Sincerely,    Klever Ernst MD   [facility]  Section of Advanced Heart Failure   Cardiovascular Division  [facility]   [phone]        Please do not hesitate to contact me if you have any questions/concerns.     Sincerely,     Klever Ernst MD

## 2020-07-20 NOTE — PROGRESS NOTES
Cardiology Clinic Note     Yahir Turcios MD    [facility]    [address] [place]  [address]       Ana Luisa Mcpherson MD    [facility]    [address], [facility] [address]    [place]  [address]       RE: Murray Nicholson   MRN: [id number]   : 1955      Dear Dr. Turcios and Dr. Mcpherson:      We had the pleasure of seeing Mr. Murray Nicholson for followup in our Cardiac Transplant Clinic at the [facility].  As you know, he is a very pleasant 64-year-old male who underwent orthotopic heart transplantation on 2018 and kidney transplant in 2019. His current problem list includes:     1.  Status post orthotopic heart transplantation.   2.  Neutropenia  3.  Oral mucosal ulcer secondary to neutropenia with Klebsiella infection.   4.  Pseudomonas bacteremia, resolved.   5.  Perforation of the small bowel, status post small-bowel resection and ileostomy.   6.  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   7.  Hypertension.   8.  Hyperlipidemia.   9.  End-stage renal disease requiring hemodialysis - S/P Kidney transplant    He is returning today for his 2-year annual follow-up.  He had his kidney transplantation in 2019.  He is overall doing very well.  He has no specific complaints.  No recent hospitalizations or ER visits.  He is compliant with his medications.    Current Outpatient Medications   Medication Sig     ACCU-CHEK GUIDE test strip USE TO TEST BLOOD SUGAR 2 TO 3 TIMES DAILY OR AS DIRECTED     acetaminophen (TYLENOL) 325 MG tablet Take 2 tablets (650 mg) by mouth every 4 hours as needed for other (multimodal surgical pain management along with NSAIDS and opioid medication as indicated based on pain control and physical function.)     aspirin 81 MG EC tablet Take 81 mg by mouth daily     atorvastatin (LIPITOR) 40 MG tablet Take 1 tablet (40 mg) by mouth daily     blood glucose monitoring (ACCU-CHEK FASTCLIX) lancets Use to test blood sugar 2  times daily or as directed.     famotidine (PEPCID) 20 MG tablet Take 1 tablet (20 mg) by mouth 2 times daily     fluticasone (FLONASE) 50 MCG/ACT nasal spray Spray 1 spray into both nostrils daily     insulin pen needle (B-D U/F) 31G X 5 MM miscellaneous Use 1 daily as directed.     magnesium oxide (MAG-OX) 400 MG tablet Total dose = 800 mg at lunch time and 400 mg at dinner time     metFORMIN (GLUCOPHAGE-XR) 500 MG 24 hr tablet Take 4 tablets (2,000 mg) by mouth daily (with breakfast)     Multiple Vitamin (DAILY-DONY) TABS TAKE 1 TABLET BY MOUTH DAILY     mycophenolate (GENERIC EQUIVALENT) 250 MG capsule Take 3 capsules (750 mg) by mouth 2 times daily     pantoprazole (PROTONIX) 20 MG EC tablet Take 40 mg by mouth every other day     sodium bicarbonate 650 MG tablet Take 2 tablets (1,300 mg) by mouth daily     sulfamethoxazole-trimethoprim (BACTRIM/SEPTRA) 400-80 MG tablet Take 1 tablet by mouth daily     tacrolimus (GENERIC EQUIVALENT) 0.5 MG capsule HOLD     tacrolimus (GENERIC EQUIVALENT) 1 MG capsule Take 1 capsule (1 mg) by mouth 2 times daily     Vitamin D3 (CHOLECALCIFEROL) 25 mcg (1000 units) tablet Take 1 tablet (25 mcg) by mouth daily     No current facility-administered medications for this visit.      Facility-Administered Medications Ordered in Other Visits   Medication     diatrizoate meglumine-sodium (GASTROGRAFIN/GASTROVIEW) 66-10 % solution 480 mL        REVIEW OF SYSTEMS:  A detailed 10-point review of systems was obtained as described in History of Present Illness.  All other systems are reviewed and are negative.      PHYSICAL EXAMINATION:   /81 (BP Location: Right arm, Patient Position: Chair, Cuff Size: Adult Regular)   Pulse 89   Wt 79.2 kg (174 lb 9.7 oz)   SpO2 100%   BMI 25.97 kg/m    He was awake, alert, oriented x3.  He was in no apparent distress.  He was comfortable.  He had no pallor, cyanosis or jaundice.  He had no jugular venous distention.  His carotids were 2+  bilaterally.  Cardiac auscultation revealed normal S1 and S2 with no murmur rub or gallop.  Auscultation of his lungs revealed equal air entry on both sides with no added sounds.  His abdomen was soft with no guarding no tenderness no rigidity no guarding.  He had no focal neurological deficit.  His extremities showed no edema.    Testing:    EKG (07/2020)  Sinus rhythm  Normal ECG  When compared with ECG of 15-JUL-2020 09:46,  No significant change was found     Cardiac MRI (07/2020)  1. The LV is normal in cavity size and wall thickness. The global systolic function is normal. The LVEF is  63%. There are no regional wall motion abnormalities.     2. The RV is normal in cavity size. The global systolic function is normal. The RVEF is 63%.      3. Both atria are enlarged from transplantation.     4. There is no significant valvular disease.      5. Late gadolinium enhancement imaging shows no MI, fibrosis or infiltrative disease.      6. Regadenoson stress perfusion imaging shows no ischemia.     7. There is no pericardial effusion or thickening.     8. There is no intracardiac thrombus.     9. The distal ascending aorta (after the anastomosis) is mildly enlarged measuring 4.0 cm.      CONCLUSIONS: No myocardial ischemia or fibrosis. Normal cardiac function, LVEF 63% and RVEF 63%.     RHC (07/2020)  RA 6  RV 30/6  PA 28/12 (18)  PCWP 9  PA% 74  CO/CI 5.5/2.8  PVR 1.6 QUINONES    Biopsy (07/2020):  ISHLT 2004 cellular grade: 0R        - No histological evidence of acute cellular rejection   - ISHLT 2013 antibody-mediated grade: pAMR 0        - No histological features diagnostic of antibody-mediated rejection        - Focal (10%), weak capillary staining for C4d        - No detectable capillary staining for C3d   - Endocardial fibrosis   - See microscopic description and comment     Coronary Angiogram (07/2020):  Single vessel obstructive coronary artery disease of the OM2 with sequential 80% stenoses. This is unchanged  from his prior angiogram 1 year ago and also from the angiogram 1 month after his OHT - likely donor disease as opposed to CAV      ALLOMAP: 37     DSA:  Lab Results   Component Value Date    SAITESTMET SA FCS 07/15/2020    SAICELL Class I 07/15/2020    [id number] None 07/15/2020    [id number] None 07/15/2020    SAIREPCOM  07/15/2020      Test performed by modified procedure. Serum heat inactivated and tested   by a modified ([place]) protocol including fetal calf serum addition.   High-risk, mfi >3,000. Mod-risk, mfi 500-3,000.       Lab Results   Component Value Date    SAIITESTME SA FCS 07/15/2020    SAIICELL Class II 07/15/2020    [id number] None 07/15/2020    [id number] None 07/15/2020    SAIIREPCOM  07/15/2020      Test performed by modified procedure. Serum heat inactivated and tested   by a modified ([place]) protocol including fetal calf serum addition.   High-risk, mfi >3,000. Mod-risk, mfi 500-3,000.         IMMUNOSUPPRESSANT LEVELS:  Lab Results   Component Value Date    TACROL 9.1 07/15/2020    DOSTAC Not Provided 07/15/2020       Lab Results   Component Value Date    CSPEC Plasma, EDTA anticoagulant 07/15/2020     CBC RESULTS:   Recent Labs   Lab Test 07/15/20  0900   WBC 4.1   RBC 4.24*   HGB 12.9*   HCT 39.5*   MCV 93   MCH 30.4   MCHC 32.7   RDW 12.9        Recent Labs   Lab Test 07/17/20  1008 07/15/20  0900    134   POTASSIUM 3.9 4.1   CHLORIDE 110* 106   CO2 22 19*   ANIONGAP 6 10   * 112*   BUN 17 26   CR 1.15 1.14   TRINI 9.1 9.2     Liver Function Studies -   Recent Labs   Lab Test 07/15/20  0900   PROTTOTAL 7.5   ALBUMIN 3.9   BILITOTAL 1.0   ALKPHOS 157*   AST 20   ALT 34     Recent Labs   Lab Test 07/15/20  0900 06/29/20  0954  08/10/15  0729 04/09/15  0900   CHOL 108 104   < > 198 182   HDL 42 43   < > 40* 46   LDL 23 27   < > Cannot estimate LDL when triglyceride exceeds 400 mg/dL Cannot estimate LDL when triglyceride exceeds 400 mg/dL  85   TRIG 217* 166*   < > 421* 426*    CHOLHDLRATIO  --   --   --  5.0 4.0    < > = values in this interval not displayed.     PSA   Date Value Ref Range Status   07/15/2020 1.64 0 - 4 ug/L Final     Comment:     Assay Method:  Chemiluminescence using Siemens Vista analyzer     TSH   Date Value Ref Range Status   04/16/2018 2.25 0.40 - 4.00 mU/L Final     CMV/EBV - negative    ASSESSMENT AND PLAN:    Mr. Murray Nicholson is a 65-year-old male status post orthotopic heart transplantation in June with a very complex postoperative course who returns today for followup.     Check CMV   CBC with Diff   CBC in One week  Decrease Cellcept to 500mg BID     #Orthotopic Heart Transplant (06/2018)     Graft Function: Normal. No evidence of cellular or antibody mediated rejection.     CAV PPx: Patient has a single obstructive coronary artery disease of the OM2 with sequential 80% stenoses. This was unchanged from his prior angiogram that was done 30 days after his transplant. Likely donor disease. Will continue ASA 81mg and Atorvastatin 40mg daily.     Immunosuppression: Continue Tacrolimus with a goal of 8-10 and  mg bid. Lower dose of Cellcept due to neutropenia in the past     #Hypertension. Off all antihypertensive medications after his renal transplant. BP under control. Will continue to monitor.  His blood pressure is currently controlled on hydralazine 100 mg 3 times a day, lisinopril 10 mg BID, Norvasc 10 mg, and Clonidine 0.1 mg once a week. . Given his elevated BP, we will stop the clonidine and increase Lisinopril 15mg BID.     #Kidney transplant     - followed by transplant nephrology  - on prophylactic Bactrim  - Renal function stable    # High risk for CMV.  Negative    #Diabetes - On metformin and sliding scale insulin. Followed by endocrinology.       RTC in 6 months with echo and Allomap. Patient will call in the interim if he has any worsening symptoms.       Sincerely,    Klever Ernst MD   [facility] for Pulmonary Hypertension  Section of  Advanced Heart Failure   Cardiovascular Division  [facility]   [phone]

## 2020-07-20 NOTE — LETTER
2020      RE: Murray Nicholson  [address]  [place] [address]       Cardiology Clinic Note     Yahir Turcios MD    [facility]    [address] [place]  [address]       Ana Luisa Mcpherson MD    [facility]    [address] [facility] [address]    [place]  [address]       RE: Murray Nicholson   MRN: [id number]   : 1955      Dear Dr. Turcios and Dr. Mcpherson:      We had the pleasure of seeing Mr. Murray Nicholson for followup in our Cardiac Transplant Clinic at the [facility].  As you know, he is a very pleasant 64-year-old male who underwent orthotopic heart transplantation on 2018 and kidney transplant in 2019. His current problem list includes:     1.  Status post orthotopic heart transplantation.   2.  Neutropenia  3.  Oral mucosal ulcer secondary to neutropenia with Klebsiella infection.   4.  Pseudomonas bacteremia, resolved.   5.  Perforation of the small bowel, status post small-bowel resection and ileostomy.   6.  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   7.  Hypertension.   8.  Hyperlipidemia.   9.  End-stage renal disease requiring hemodialysis - S/P Kidney transplant    He is returning today for his 2-year annual follow-up.  He had his kidney transplantation in 2019.  He is overall doing very well.  He has no specific complaints.  No recent hospitalizations or ER visits.  He is compliant with his medications.    Current Outpatient Medications   Medication Sig     ACCU-CHEK GUIDE test strip USE TO TEST BLOOD SUGAR 2 TO 3 TIMES DAILY OR AS DIRECTED     acetaminophen (TYLENOL) 325 MG tablet Take 2 tablets (650 mg) by mouth every 4 hours as needed for other (multimodal surgical pain management along with NSAIDS and opioid medication as indicated based on pain control and physical function.)     aspirin 81 MG EC tablet Take 81 mg by mouth daily     atorvastatin (LIPITOR) 40 MG tablet Take 1 tablet (40 mg) by mouth daily      blood glucose monitoring (ACCU-CHEK FASTCLIX) lancets Use to test blood sugar 2 times daily or as directed.     famotidine (PEPCID) 20 MG tablet Take 1 tablet (20 mg) by mouth 2 times daily     fluticasone (FLONASE) 50 MCG/ACT nasal spray Spray 1 spray into both nostrils daily     insulin pen needle (B-D U/F) 31G X 5 MM miscellaneous Use 1 daily as directed.     magnesium oxide (MAG-OX) 400 MG tablet Total dose = 800 mg at lunch time and 400 mg at dinner time     metFORMIN (GLUCOPHAGE-XR) 500 MG 24 hr tablet Take 4 tablets (2,000 mg) by mouth daily (with breakfast)     Multiple Vitamin (DAILY-DONY) TABS TAKE 1 TABLET BY MOUTH DAILY     mycophenolate (GENERIC EQUIVALENT) 250 MG capsule Take 3 capsules (750 mg) by mouth 2 times daily     pantoprazole (PROTONIX) 20 MG EC tablet Take 40 mg by mouth every other day     sodium bicarbonate 650 MG tablet Take 2 tablets (1,300 mg) by mouth daily     sulfamethoxazole-trimethoprim (BACTRIM/SEPTRA) 400-80 MG tablet Take 1 tablet by mouth daily     tacrolimus (GENERIC EQUIVALENT) 0.5 MG capsule HOLD     tacrolimus (GENERIC EQUIVALENT) 1 MG capsule Take 1 capsule (1 mg) by mouth 2 times daily     Vitamin D3 (CHOLECALCIFEROL) 25 mcg (1000 units) tablet Take 1 tablet (25 mcg) by mouth daily     No current facility-administered medications for this visit.      Facility-Administered Medications Ordered in Other Visits   Medication     diatrizoate meglumine-sodium (GASTROGRAFIN/GASTROVIEW) 66-10 % solution 480 mL        REVIEW OF SYSTEMS:  A detailed 10-point review of systems was obtained as described in History of Present Illness.  All other systems are reviewed and are negative.      PHYSICAL EXAMINATION:   /81 (BP Location: Right arm, Patient Position: Chair, Cuff Size: Adult Regular)   Pulse 89   Wt 79.2 kg (174 lb 9.7 oz)   SpO2 100%   BMI 25.97 kg/m    He was awake, alert, oriented x3.  He was in no apparent distress.  He was comfortable.  He had no pallor,  cyanosis or jaundice.  He had no jugular venous distention.  His carotids were 2+ bilaterally.  Cardiac auscultation revealed normal S1 and S2 with no murmur rub or gallop.  Auscultation of his lungs revealed equal air entry on both sides with no added sounds.  His abdomen was soft with no guarding no tenderness no rigidity no guarding.  He had no focal neurological deficit.  His extremities showed no edema.    Testing:    EKG (07/2020)  Sinus rhythm  Normal ECG  When compared with ECG of 15-JUL-2020 09:46,  No significant change was found     Cardiac MRI (07/2020)  1. The LV is normal in cavity size and wall thickness. The global systolic function is normal. The LVEF is  63%. There are no regional wall motion abnormalities.     2. The RV is normal in cavity size. The global systolic function is normal. The RVEF is 63%.      3. Both atria are enlarged from transplantation.     4. There is no significant valvular disease.      5. Late gadolinium enhancement imaging shows no MI, fibrosis or infiltrative disease.      6. Regadenoson stress perfusion imaging shows no ischemia.     7. There is no pericardial effusion or thickening.     8. There is no intracardiac thrombus.     9. The distal ascending aorta (after the anastomosis) is mildly enlarged measuring 4.0 cm.      CONCLUSIONS: No myocardial ischemia or fibrosis. Normal cardiac function, LVEF 63% and RVEF 63%.     RHC (07/2020)  RA 6  RV 30/6  PA 28/12 (18)  PCWP 9  PA% 74  CO/CI 5.5/2.8  PVR 1.6 QUINONES    Biopsy (07/2020):  ISHLT 2004 cellular grade: 0R        - No histological evidence of acute cellular rejection   - ISHLT 2013 antibody-mediated grade: pAMR 0        - No histological features diagnostic of antibody-mediated rejection        - Focal (10%), weak capillary staining for C4d        - No detectable capillary staining for C3d   - Endocardial fibrosis   - See microscopic description and comment     Coronary Angiogram (07/2020):  Single vessel obstructive  coronary artery disease of the OM2 with sequential 80% stenoses. This is unchanged from his prior angiogram 1 year ago and also from the angiogram 1 month after his OHT - likely donor disease as opposed to CAV      ALLOMAP: 37     DSA:  Lab Results   Component Value Date    SAITESTMET SA FCS 07/15/2020    SAICELL Class I 07/15/2020    [id number] None 07/15/2020    [id number] None 07/15/2020    SAIREPCOM  07/15/2020      Test performed by modified procedure. Serum heat inactivated and tested   by a modified ([place]) protocol including fetal calf serum addition.   High-risk, mfi >3,000. Mod-risk, mfi 500-3,000.       Lab Results   Component Value Date    SAIITESTME SA FCS 07/15/2020    SAIICELL Class II 07/15/2020    [id number] None 07/15/2020    [id number] None 07/15/2020    SAIIREPCOM  07/15/2020      Test performed by modified procedure. Serum heat inactivated and tested   by a modified ([place]) protocol including fetal calf serum addition.   High-risk, mfi >3,000. Mod-risk, mfi 500-3,000.         IMMUNOSUPPRESSANT LEVELS:  Lab Results   Component Value Date    TACROL 9.1 07/15/2020    DOSTAC Not Provided 07/15/2020       Lab Results   Component Value Date    CSPEC Plasma, EDTA anticoagulant 07/15/2020     CBC RESULTS:   Recent Labs   Lab Test 07/15/20  0900   WBC 4.1   RBC 4.24*   HGB 12.9*   HCT 39.5*   MCV 93   MCH 30.4   MCHC 32.7   RDW 12.9        Recent Labs   Lab Test 07/17/20  1008 07/15/20  0900    134   POTASSIUM 3.9 4.1   CHLORIDE 110* 106   CO2 22 19*   ANIONGAP 6 10   * 112*   BUN 17 26   CR 1.15 1.14   TRINI 9.1 9.2     Liver Function Studies -   Recent Labs   Lab Test 07/15/20  0900   PROTTOTAL 7.5   ALBUMIN 3.9   BILITOTAL 1.0   ALKPHOS 157*   AST 20   ALT 34     Recent Labs   Lab Test 07/15/20  0900 06/29/20  0954  08/10/15  0729 04/09/15  0900   CHOL 108 104   < > 198 182   HDL 42 43   < > 40* 46   LDL 23 27   < > Cannot estimate LDL when triglyceride exceeds 400 mg/dL Cannot  estimate LDL when triglyceride exceeds 400 mg/dL  85   TRIG 217* 166*   < > 421* 426*   CHOLHDLRATIO  --   --   --  5.0 4.0    < > = values in this interval not displayed.     PSA   Date Value Ref Range Status   07/15/2020 1.64 0 - 4 ug/L Final     Comment:     Assay Method:  Chemiluminescence using Siemens Vista analyzer     TSH   Date Value Ref Range Status   04/16/2018 2.25 0.40 - 4.00 mU/L Final     CMV/EBV - negative    ASSESSMENT AND PLAN:    Mr. Murray Nicholson is a 65-year-old male status post orthotopic heart transplantation in June with a very complex postoperative course who returns today for followup.     Check CMV   CBC with Diff   CBC in One week  Decrease Cellcept to 500mg BID     #Orthotopic Heart Transplant (06/2018)     Graft Function: Normal. No evidence of cellular or antibody mediated rejection.     CAV PPx: Patient has a single obstructive coronary artery disease of the OM2 with sequential 80% stenoses. This was unchanged from his prior angiogram that was done 30 days after his transplant. Likely donor disease. Will continue ASA 81mg and Atorvastatin 40mg daily.     Immunosuppression: Continue Tacrolimus with a goal of 8-10 and  mg bid. Lower dose of Cellcept due to neutropenia in the past     #Hypertension. Off all antihypertensive medications after his renal transplant. BP under control. Will continue to monitor.  His blood pressure is currently controlled on hydralazine 100 mg 3 times a day, lisinopril 10 mg BID, Norvasc 10 mg, and Clonidine 0.1 mg once a week. . Given his elevated BP, we will stop the clonidine and increase Lisinopril 15mg BID.     #Kidney transplant     - followed by transplant nephrology  - on prophylactic Bactrim  - Renal function stable    # High risk for CMV.  Negative    #Diabetes - On metformin and sliding scale insulin. Followed by endocrinology.       RTC in 6 months with echo and Allomap. Patient will call in the interim if he has any worsening symptoms.        Sincerely,    Klever Ernst MD   [facility]  Section of Advanced Heart Failure   Cardiovascular Division  [facility]   [phone]             Klever Ernst MD

## 2020-07-21 DIAGNOSIS — Z94.0 KIDNEY REPLACED BY TRANSPLANT: ICD-10-CM

## 2020-07-21 NOTE — PATIENT INSTRUCTIONS
Please call your coordinator at [phone] with any questions or concerns.    Recheck tac level and Immuknow, as scheduled    Coordinator will make referral to Endocrine to assist with blood sugar management.    Low fat diet and regular exercise for elevated triglycerides     Return to clinic in December 2020. Labs with DSAs, Allomap and level, ECHO and clinic visit    Repeat cardiac MRI for annual 2021.

## 2020-07-22 LAB
ALLOMAP SCORE (EXTERNAL): 37
NEGATIVE PREDICTIVE VALUE PERCENT (EXTERNAL): 98.4 %
POSITIVE PREDICTIVE VALUE PERCENT (EXTERNAL): NORMAL %

## 2020-07-27 RX ORDER — VITAMIN B COMPLEX
25 TABLET ORAL DAILY
Qty: 30 TABLET | Refills: 1 | Status: SHIPPED | OUTPATIENT
Start: 2020-07-27 | End: 2020-09-24

## 2020-07-27 NOTE — PLAN OF CARE
Problem: Patient Care Overview  Goal: Plan of Care/Patient Progress Review  Outcome: No Change    D: Pt with hx ICM undergoing heart/kidney transplant w/u.   I/A: Dobutamine gtt at 2.5 mcg/kg/min. 's-130's with BBB. 10bt run VT at 1938 (rate 205). RN in room at time, pt denies feeling palpitations, racing heart. VSS. BG monitored. SSI at HS. Pt reported SOB at ~0200. Placed on 2L NC for comfort. Pt independent with cares. Aneuric (states he voids dribbles with BM's). HD T,TH,Sat. NPO after MN for EUS.   P: Plan for EUS today. NPO prior to procedure. Continue transplant w/u. Monitor and assess pt condition and contact treatment team with questions or concerns.        no

## 2020-07-27 NOTE — TELEPHONE ENCOUNTER
RECORDS RECEIVED FROM: Internal   DATE RECEIVED: 8.5.20   NOTES (FOR ALL VISITS) STATUS DETAILS   OFFICE NOTES from referring provider Internal 7.21.20 JAYMIE Ernst [facility] Card[facility]   OFFICE NOTES from other specialist N/A    ED NOTES N/A    OPERATIVE REPORT  (thyroid, pituitary, adrenal, parathyroid) N/A    MEDICATION LIST Internal    IMAGING      DEXASCAN N/A    MRI (BRAIN) N/A    XR (Chest) Internal 12.10.19  6.10.19  9.11.18  More in Epic if needed   CT (HEAD/NECK/CHEST/ABDOMEN) Internal 11.16.18 chest ab pel  9.11.18 chest ab pel  8.12.18 ab pel  More in Epic if needed   NUCLEAR  N/A    ULTRASOUND (HEAD/NECK) N/A    LABS     DIABETES: HBGA1C, CREATININE, FASTING LIPIDS, MICROALBUMIN URINE, POTASSIUM, TSH, T4    THYROID: TSH, T4, CBC, THYRODLONULIN, TOTAL T3, FREE T4, CALCITONIN, CEA Internal

## 2020-07-29 ENCOUNTER — TELEPHONE (OUTPATIENT)
Dept: TRANSPLANT | Facility: CLINIC | Age: 65
End: 2020-07-29

## 2020-07-29 NOTE — TELEPHONE ENCOUNTER
Post Kidney and Pancreas Transplant Team Conference  Date: 7/29/2020  Transplant Coordinator: Lillie Ulloa, Cami Villarreal     Attendees:  [x]  Dr. Quijano  [x] Ptat Johns LPN     []  Dr. Ulloa [x] Edith Ward, TONY [] Donna Wheat LPN   [x]  Dr. Escobar [] Marielena Romero RN     [] Radha Gray RN [x] Eduardo Lea, PharmD   [] Dr. Ayala [x] Jennifer Villarreal, TONY    [x] Dr. Cheney [] Fantasma Dorsey RN    [x] Dr. Chase [] Autumn Mendez RN    [x] Dr. Cowan [] Kari Carr RN     [] Anastasia Tanner, TONY    [] Surgery Fellow [x] Alma Rosa Monique RN    [x] Maliha eJsus, LEWIS [] Scarlet Haines RN        Verbal Plan Read Back:   Due for BK    Routed to RN Coordinator   Patt Johns LPN    Message sent to [facility] via [employer] to please remind lab staff to draw BK during scheduled lab draw on 8/10.

## 2020-08-05 ENCOUNTER — VIRTUAL VISIT (OUTPATIENT)
Dept: ENDOCRINOLOGY | Facility: CLINIC | Age: 65
End: 2020-08-05
Payer: MEDICARE

## 2020-08-05 ENCOUNTER — PRE VISIT (OUTPATIENT)
Dept: ENDOCRINOLOGY | Facility: CLINIC | Age: 65
End: 2020-08-05

## 2020-08-05 DIAGNOSIS — Z94.1 HEART REPLACED BY TRANSPLANT (H): ICD-10-CM

## 2020-08-05 DIAGNOSIS — E11.9 TYPE 2 DIABETES MELLITUS WITHOUT COMPLICATION, WITHOUT LONG-TERM CURRENT USE OF INSULIN (H): Primary | ICD-10-CM

## 2020-08-05 NOTE — PROGRESS NOTES
"dm 2  [facility]    Patients Glucose Data was Obtained via Phone and Located in Table Below      Week of__/__/__ Date  7__/_22_  Date  _7_/23__ Date  _7_/_24_ Date  __7/_25_ Date  _7_/26__ Date  _7_/27__ Date  _7_/_28_    Time BG Time BG Time BG Time BG Time BG Time BG Time BG    am 137 am 137 am 125 am 111 am 125 am 111 am 118                                                        Week of__/__/__ Date  _7_/_29_  Date  7__/30__ Date  _7_/31__ Date  8__/1__ Date  _8_/_2_ Date  _8_/_3_ Date  _8_/_4_    Time BG Time BG Time BG Time BG Time BG Time BG Time BG    am 115 am 111 am 124 am 124 am 130 am 121 am 118                                                            Murray Nicholson is a 65 year old male who is being evaluated via a billable video visit.      The patient has been notified of following:     \"This video visit will be conducted via a call between you and your physician/provider. We have found that certain health care needs can be provided without the need for an in-person physical exam.  This service lets us provide the care you need with a video conversation.  If a prescription is necessary we can send it directly to your pharmacy.  If lab work is needed we can place an order for that and you can then stop by our lab to have the test done at a later time.    Video visits are billed at different rates depending on your insurance coverage.  Please reach out to your insurance provider with any questions.    If during the course of the call the physician/provider feels a video visit is not appropriate, you will not be charged for this service.\"    Patient has given verbal consent for Video visit? Yes  How would you like to obtain your AVS? Mail a copy  If you are dropped from the video visit, the video invite should be resent to: Send to e-mail at: [email or web address]  Will anyone else be joining your video visit? No        Video-Visit Details    Type of service:  Video Visit    Platform used: " adaffix  Start time: 0900  Stop time: 1000  Total time: 60 minutes    Originating Location (pt. Location): Home    Distant Location (provider location):  [email or web address]     Platform used for Video Visit: [facility]      Endocrinology Clinic New Consult      Murray Nicholson MRN:[id number] YOB: 1955  Primary care provider: Emilia Knutson     Reason for Endocrine consult: management of type II diabetes    HPI:  Murray Nicholson is a 65 year old male with PMHx of diabetes mellitus type II for more than 20 years, ischemic cardiomyopathy s/p orthotopic heart transplant in June 2018, diabetic nephropathy leading to ESRD s/p renal transplant in December 2019, hyperlipidemia, on immunosuppression with mycophenolate and tacrolimus    The patient states that he is happy with his diabetes control. When he underwent renal transplant he had been started on insulin therapy in the hospital and was discharged on the same. He was discharged on 18 units of glargine. He has slowly been able to take himself of insulin, he last gave himself insulin in May. Despite being of insulin his blood glucose readings have mostly stayed between 80 to 130. He tries to eat healthy and goes for walks 3 to 4 times a week. He states that he has regained his appetite after getting the renal transplant so he has been eating more than he used to. He has gained about 5 to 10 pounds as a result. He would like to lose this weight to become more healthy and is determined to do so. No episodes of hypoglycemia    History of Diabetes  Type 2  Diagnosed more than 20 years ago  Control: adequate  Treatment history: was previously on glipizide, insulin glargine    Current Treatment: metformin 1 g twice daily    HbA1c was 6.3 on 7/15/2020    Glucometer summary:     Average blood glucose checks per day: 1    Average:     Hypoglycemic episodes: none    Diet:     has three meals daily, sometimes snacks in between meals as well.  Cooks at home    Steroids: none    Exercise :  walks three days a week for about 30 to 40 minutes    Tobacco use:  no    Complications:    Retinopathy: absent, according to the patient. Last eye exam was one month ago    Nephropathy: ESRD due to diabetic nephropathy s/p renal transplant    Neuropathy: denies symptoms. Unable to do monofilament test    Foot ulcers: none    CAD: ischemic cardiomyopathy s/p heart transplant    Lipids: on statin therapy    Hypertension: off antihypertensives now    Infections: none    Endocrine review of system:    -Denies any polyuria, nocturia, excessive thirst or know sodium issues  -Denies polydipsia, polyphagia, blurred vision.   -Denies diarrhea or constipation or foul smelling stools or floating fatty stools  -Denies history of diarrhea, peptic ulcer disease or bleeding   -Denies episodic headache or unilateral headaches, denies any flushing or palpitation symptoms associated with that headache  -Denies any known thyroid issues  -Denies history of nipple discharge or gynecomastia or taking antifungal drugs  -Denies any known adrenal issues or low energy on a daily basis or nausea, vomiting or new abdominal pain.  -Denies skin changes, skin stretching or tanning  -Denies pigmentation in elbows, palate or mucosa  -Denies weight changes  -Denies muscle cramps or constipation      ROS:  All 12 systems were reviewed and negative except as mentioned in HPI    Past Medical/Surgical History:  Past Medical History:   Diagnosis Date     (HFpEF) heart failure with preserved ejection fraction (H)      Allergic rhinitis, cause unspecified      Anemia of chronic kidney failure      AS (aortic stenosis)      Ascending aortic aneurysm (H)      Bicuspid aortic valve      CAD (coronary artery disease)      Congestive heart failure, unspecified      Dialysis patient (H)     Tues-Thur-Sat     Dyslipidemia      Esophageal reflux      ESRD (end stage renal disease) (H)      Hearing problem      Heart  replaced by transplant (H) 12/10/2018     Hypersomnia with sleep apnea, unspecified      Hypertension      Ileostomy status (H)      Immunosuppression (H)      MGUS (monoclonal gammopathy of unknown significance)      Mitral regurgitation      SHEELA (obstructive sleep apnea)     No CPAP     Pneumonia      Systolic heart failure (H)      Type 2 diabetes mellitus (H)      Past Surgical History:   Procedure Laterality Date     CARDIAC SURGERY       COLONOSCOPY N/A 5/3/2018    Procedure: COLONOSCOPY;  colonoscopy ;  Surgeon: Ammon Castillo MD;  Location: [facility]     CREATE FISTULA ARTERIOVENOUS UPPER EXTREMITY BOVINE Left 5/8/2019    Procedure: Left Upper Extremity Arteriovenous Bovine Graft Creation;  Surgeon: Calin Cheney MD;  [facility]     CV CORONARY ANGIOGRAM N/A 6/28/2019    Procedure: CV CORONARY ANGIOGRAM;  Surgeon: Montrell Posada MD;  [facility]     CV CORONARY ANGIOGRAM N/A 7/15/2020    Procedure: CV CORONARY ANGIOGRAM;  Surgeon: Marcelo Ramírez MD;  [facility]     CV HEART BIOPSY N/A 2/1/2019    Procedure: HBX;  Surgeon: Montrell Posada MD;  [facility]     CV HEART BIOPSY N/A 3/22/2019    Procedure: HBX, RIJV ACCESS;  Surgeon: Jordan Fox MD;  [facility]     CV HEART BIOPSY N/A 6/28/2019    Procedure: CV HEART BIOPSY;  Surgeon: Montrell Posada MD;  [facility]     CV HEART BIOPSY N/A 10/28/2019    Procedure: CV HEART BIOPSY;  Surgeon: Marcelo Ramírez MD;  [facility]     CV HEART BIOPSY N/A 2/6/2020    Procedure: CV HEART BIOPSY;  Surgeon: Montrell Posada MD;  [facility]     CV HEART BIOPSY N/A 7/15/2020    Procedure: CV HEART BIOPSY;  Surgeon: Marcelo Ramírez MD;  [facility]     CV RIGHT HEART CATH N/A 6/28/2019    Procedure: CV RIGHT HEART CATH;  Surgeon:  Montrell Posada MD;  [facility]     CV RIGHT HEART CATH N/A 7/15/2020    Procedure: CV RIGHT HEART CATH;  Surgeon: Marcelo Ramírez MD;  [facility]     ESOPHAGOSCOPY, GASTROSCOPY, DUODENOSCOPY (EGD), COMBINED N/A 5/7/2018    Procedure: COMBINED ENDOSCOPIC ULTRASOUND, ESOPHAGOSCOPY, GASTROSCOPY, DUODENOSCOPY (EGD), FINE NEEDLE ASPIRATE/BIOPSY;  Endoscopic Ultrasound with Fine Needle Aspiration ;  Surgeon: Alon Don MD;  [facility]     EXAM UNDER ANESTHESIA RECTUM N/A 8/12/2018    Procedure: EXAM UNDER ANESTHESIA RECTUM;  EXAM UNDER ANESTHESIA RECTUM ,COMBINED INCISION AND DRAINAGE OF RECTAL ABCESS ;  Surgeon: Rick Tran MD;  [facility]     INCISION AND DRAINAGE RECTUM, COMBINED N/A 8/12/2018    Procedure: COMBINED INCISION AND DRAINAGE RECTUM;;  Surgeon: Rick Tran MD;  [facility]     IR DIALYSIS FISTULOGRAM LEFT  9/25/2019     IR DIALYSIS FISTULOGRAM LEFT  11/22/2019     IR DIALYSIS PTA  9/25/2019     IR DIALYSIS PTA  11/22/2019     LAPAROSCOPIC ASSISTED COLOSTOMY TAKEDOWN N/A 12/11/2018    Procedure: Laparoscopic Assisted Colostomy Takedown, Laparoscopic Lysis of Adhesions;  Surgeon: Rick Tran MD;  [facility]     LAPAROSCOPIC ASSISTED SIGMOID COLECTOMY N/A 8/14/2018    Procedure: LAPAROSCOPIC ASSISTED SIGMOID COLECTOMY;  Laparoscopic Hand Assisted Takedown of Splenic Flexure, Sigmoidectomy, Small Bowel Resection, Takedown of Small Bowel to Colon Fistula;  Surgeon: Rick Tran MD;  [facility]     LAPAROSCOPIC HERNIORRHAPHY INGUINAL BILATERAL Bilateral 7/24/2015    Procedure: LAPAROSCOPIC HERNIORRHAPHY INGUINAL BILATERAL;  Surgeon: Bobby Mcconnell MD;  [facility]     LAPAROSCOPIC INSERTION CATHETER PERITONEAL DIALYSIS N/A 6/22/2017    Procedure: LAPAROSCOPIC INSERTION CATHETER PERITONEAL DIALYSIS;  Laparoscopic Peritoneal Dialysis Catheter Placement -  Anesthesia with block;  Surgeon: Esteban Arvizu MD;  [facility]     PICC INSERTION Left 04/22/2018    5Fr - 49cm (3cm external), Basilic vein, low SVC     REMOVE CATHETER PERITONEAL Right 1/15/2018    Procedure: REMOVE CATHETER PERITONEAL;  Open Removal of Peritoneal Dialysis Catheter ;  Surgeon: Esteban Arvizu MD;  [facility]     SIGMOIDOSCOPY FLEXIBLE N/A 11/21/2018    Procedure: Examination Under Anesthesia, Flexible Sigmoidoscopy and Polypectomy;  Surgeon: Rick Tran MD;  [facility]     SIGMOIDOSCOPY FLEXIBLE N/A 12/11/2018    Procedure: Flexible Sigmoidoscopy;  Surgeon: Rick Tran MD;  [facility]     TAKEDOWN ILEOSTOMY N/A 3/27/2019    Procedure: Takedown Ileostomy;  Surgeon: Rick Tran MD;  [facility]     TRANSPLANT HEART RECIPIENT N/A 6/14/2018    Procedure: TRANSPLANT HEART RECIPIENT;  Median Sternotomy, on-pump oxygenator, Heart Transplant;  Surgeon: Rony Caputo MD;  [facility]     TRANSPLANT KIDNEY RECIPIENT LIVING UNRELATED  12/2019       Allergies:  Allergies   Allergen Reactions     Norco [Hydrocodone-Acetaminophen] Nausea and Vomiting     Cats      Throat tightness     Isosorbide Other (See Comments)     hypotension     Penicillins Hives     Seasonal Allergies      rhinitis     Shrimp      Throat closes        PTA Meds:  Prior to Admission medications    Medication Sig Last Dose Taking? Auth Provider   ACCU-CHEK GUIDE test strip USE TO TEST BLOOD SUGAR 2 TO 3 TIMES DAILY OR AS DIRECTED Taking Yes Yahir Turcios MD   acetaminophen (TYLENOL) 325 MG tablet Take 2 tablets (650 mg) by mouth every 4 hours as needed for other (multimodal surgical pain management along with NSAIDS and opioid medication as indicated based on pain control and physical function.) Taking Yes Fatimah Borja PA-C   aspirin 81 MG EC tablet Take 81 mg by mouth daily Taking Yes Reported, Patient   atorvastatin (LIPITOR) 40 MG tablet Take  1 tablet (40 mg) by mouth daily Taking Yes Klever Ernst MD   biotin 1000 MCG TABS tablet Take 1,000 mcg by mouth daily Patient takes every other day Taking Yes Reported, Patient   blood glucose monitoring (ACCU-CHEK FASTCLIX) lancets USE TO TEST 2-3 TIMES DAILY OR AS DIRECTED. Taking Yes Emilia Knutson MD   famotidine (PEPCID) 20 MG tablet Take 1 tablet (20 mg) by mouth 2 times daily Taking Yes Yahir Turcios MD   fluticasone (FLONASE) 50 MCG/ACT nasal spray Spray 1 spray into both nostrils daily Taking Yes Ana Luisa Coleman APRN CNP   Lactobacillus (ACIDOPHILUS PO) 4 billion active cultures Taking Yes Reported, Patient   loratadine (CLARITIN) 10 MG tablet Take 10 mg by mouth daily Patient takes at bedtime Taking Yes Reported, Patient   magnesium oxide (MAG-OX) 400 MG tablet Total dose = 800 mg at lunch time and 400 mg at dinner time Taking Yes Gilberto Ulloa MD   metFORMIN (GLUCOPHAGE-XR) 500 MG 24 hr tablet Take 4 tablets (2,000 mg) by mouth daily (with breakfast) Taking Yes Yahir Turcios MD   Multiple Vitamin (DAILY-DONY) TABS TAKE 1 TABLET BY MOUTH DAILY Taking Yes Yahir Turcios MD   mycophenolate (GENERIC EQUIVALENT) 250 MG capsule Take 3 capsules (750 mg) by mouth 2 times daily Taking Yes Giovany Quijano MD   pantoprazole (PROTONIX) 20 MG EC tablet Take 40 mg by mouth every other day Taking Yes Reported, Patient   sodium bicarbonate 650 MG tablet Take 2 tablets (1,300 mg) by mouth daily Taking Yes Giovany Quijano MD   sulfamethoxazole-trimethoprim (BACTRIM/SEPTRA) 400-80 MG tablet Take 1 tablet by mouth daily Taking Yes Bobby Escobar MD   tacrolimus (GENERIC EQUIVALENT) 1 MG capsule Take 1 capsule (1 mg) by mouth 2 times daily Taking Yes Gilberto Ulloa MD   Vitamin D3 (CHOLECALCIFEROL) 25 mcg (1000 units) tablet Take 1 tablet (25 mcg) by mouth daily Taking Yes Giovany Quijano MD   insulin pen needle (B-D U/F) 31G X 5 MM miscellaneous  Use 1 daily as directed.  Patient not taking: Reported on 2020 Not Taking  Yahir Turcios MD   tacrolimus (GENERIC EQUIVALENT) 0.5 MG capsule HOLD  Patient not taking: Reported on 2020 Not Taking  Gilberto Ulloa MD        Current Medications:   Current Outpatient Medications   Medication     ACCU-CHEK GUIDE test strip     acetaminophen (TYLENOL) 325 MG tablet     aspirin 81 MG EC tablet     atorvastatin (LIPITOR) 40 MG tablet     biotin 1000 MCG TABS tablet     blood glucose monitoring (ACCU-CHEK FASTCLIX) lancets     famotidine (PEPCID) 20 MG tablet     fluticasone (FLONASE) 50 MCG/ACT nasal spray     Lactobacillus (ACIDOPHILUS PO)     loratadine (CLARITIN) 10 MG tablet     magnesium oxide (MAG-OX) 400 MG tablet     metFORMIN (GLUCOPHAGE-XR) 500 MG 24 hr tablet     Multiple Vitamin (DAILY-DONY) TABS     mycophenolate (GENERIC EQUIVALENT) 250 MG capsule     pantoprazole (PROTONIX) 20 MG EC tablet     sodium bicarbonate 650 MG tablet     sulfamethoxazole-trimethoprim (BACTRIM/SEPTRA) 400-80 MG tablet     tacrolimus (GENERIC EQUIVALENT) 1 MG capsule     Vitamin D3 (CHOLECALCIFEROL) 25 mcg (1000 units) tablet     insulin pen needle (B-D U/F) 31G X 5 MM miscellaneous     tacrolimus (GENERIC EQUIVALENT) 0.5 MG capsule     No current facility-administered medications for this visit.      Facility-Administered Medications Ordered in Other Visits   Medication     diatrizoate meglumine-sodium (GASTROGRAFIN/GASTROVIEW) 66-10 % solution 480 mL       Family History:  Family History   Problem Relation Age of Onset     C.A.D. Father          from-never knew father-age 60     Diabetes Father      Cerebrovascular Disease Father      Hypertension Father      Hypertension No family hx of      Breast Cancer No family hx of      Cancer - colorectal No family hx of      Prostate Cancer No family hx of      Kidney Disease No family hx of      Melanoma No family hx of      Skin Cancer No family hx of        Social  History:  Social History     Tobacco Use     Smoking status: Former Smoker     Packs/day: 1.00     Years: 20.00     Pack years: 20.00     Types: Cigarettes     Start date: 1984     Last attempt to quit: 1994     Years since quittin.9     Smokeless tobacco: Never Used   Substance Use Topics     Alcohol use: No     Alcohol/week: 0.0 standard drinks         Physical examination:  General:  Healthy, alert and oriented X3, NAD, answering questions appropriately.  Pulmonary: No audible wheeze or cough. Able to speak fully in complete sentences.   Neurological: Alert. Mentation intact and speech normal.  Psychological: mentation appears normal, affect normal/bright, judgement and insight intact, normal speech  Physical exam is limited due to virtual visit related to COVID-19     Endocrine Labs:  ENDO DIABETES Latest Ref Rng & Units 2020 7/15/2020   HEMOGLOBIN A1C 0 - 5.6 % 6.1 (H) 6.3 (H)   HGB 13.3 - 17.7 g/dL 13.5 12.9 (L)   GLUCOSE 70 - 99 mg/dL 116 (H) 112 (H)     ENDO DIABETES Latest Ref Rng & Units 2020 7/15/2020   CHOLESTEROL <200 mg/dL 104 108   LDL CHOLESTEROL, CALCULATED <100 mg/dL 27 23   LDL CHOLESTEROL DIRECT 0 - 129 mg/dL     HDL CHOLESTEROL >39 mg/dL 43 42   VLDL-CHOLESTEROL 0 - 30 mg/dL     NON HDL CHOLESTEROL <130 mg/dL 61 66   TRIGLYCERIDES <150 mg/dL 166 (H) 217 (H)        Assessment and Plan:   1. Long-standing type II diabetes mellitus, non-insulin-dependent: currently on metformin thousand milligrams BID. Insulin discontinued in May. Has ESRD secondary to diabetic nephropathy s/p renal transplant in 2019. Last HbA1c in 2020 was 6.3    Morning glucose readings are within target range    Will continue him on the same regimen for now    Immunosuppressive drugs might affect his glycemic control    Patient has been counseled to continue with healthy lifestyle measures including increasing physical activity    2. Diabetes complications:    Ischemic cardiomyopathy s/p  orthotopic heart transplant in 2018: on aspirin and statin therapy    Hyperlipidemia: on statin therapy    Diabetic nephropathy s/p renal transplant in 2019: following up with nephrologist    3. Lipids: has ASCVD. On statin therapy     4. Calcium supplementation: has been encouraged to increase intake of food products with high calcium content    Will also prescribed him Citrical supplementation to improve bone health    Due to the COVID 19 pandemic this visit was a video visit in order to help prevent spread of infection in this high risk patient and the general population. The patient gave verbal consent for the visit today.   Platform used: [employer]  Start time: 0900  Stop time: 1000  Total time: 60 minutes    Case discussed with MD Briana Disla MD  Endocrinology Fellow  Pager number [phone]    --- Addendum----  I saw the patient with endocrine fellow Dr. Hogue and directly discussed with patient. Agree above note and plan.          Mariangel Merida MD  Staff Physician  Endocrinology and Metabolism  [facility]  License: [place] [id number]  Pager: [phone]

## 2020-08-05 NOTE — LETTER
"8/5/2020       RE: Murray Nicholson  [address]  [place] [address]     Dear Colleague,    Thank you for referring your patient, Murray Nicholson, to the [facility] ENDOCRINOLOGY at [facility]. Please see a copy of my visit note below.    Opened by error.       dm 2  Minerva Javier, CMA    Patients Glucose Data was Obtained via Phone and Located in Table Below      Week of__/__/__ Date  7__/_22_  Date  _7_/23__ Date  _7_/_24_ Date  __7/_25_ Date  _7_/26__ Date  _7_/27__ Date  _7_/_28_    Time BG Time BG Time BG Time BG Time BG Time BG Time BG    am 137 am 137 am 125 am 111 am 125 am 111 am 118                                                        Week of__/__/__ Date  _7_/_29_  Date  7__/30__ Date  _7_/31__ Date  8__/1__ Date  _8_/_2_ Date  _8_/_3_ Date  _8_/_4_    Time BG Time BG Time BG Time BG Time BG Time BG Time BG    am 115 am 111 am 124 am 124 am 130 am 121 am 118                                                            Murray Nicholson is a 65 year old male who is being evaluated via a billable video visit.      The patient has been notified of following:     \"This video visit will be conducted via a call between you and your physician/provider. We have found that certain health care needs can be provided without the need for an in-person physical exam.  This service lets us provide the care you need with a video conversation.  If a prescription is necessary we can send it directly to your pharmacy.  If lab work is needed we can place an order for that and you can then stop by our lab to have the test done at a later time.    Video visits are billed at different rates depending on your insurance coverage.  Please reach out to your insurance provider with any questions.    If during the course of the call the physician/provider feels a video visit is not appropriate, you will not be charged for this service.\"    Patient has given verbal consent for Video visit? " Yes  How would you like to obtain your AVS? Mail a copy  If you are dropped from the video visit, the video invite should be resent to: Send to e-mail at: [email or web address]  Will anyone else be joining your video visit? No        Video-Visit Details    Type of service:  Video Visit    Platform used: [place]  Start time: 0900  Stop time: 1000  Total time: 60 minutes    Originating Location (pt. Location): Home    Distant Location (provider location):  [facility] ENDOCRINOLOGY     Platform used for Video Visit: [facility]      Endocrinology Clinic New Consult      Murray Nicholson MRN:[id number] YOB: 1955  Primary care provider: Emilia Knutson     Reason for Endocrine consult: management of type II diabetes    HPI:  Murray Nicholson is a 65 year old male with PMHx of diabetes mellitus type II for more than 20 years, ischemic cardiomyopathy s/p orthotopic heart transplant in June 2018, diabetic nephropathy leading to ESRD s/p renal transplant in December 2019, hyperlipidemia, on immunosuppression with mycophenolate and tacrolimus    The patient states that he is happy with his diabetes control. When he underwent renal transplant he had been started on insulin therapy in the hospital and was discharged on the same. He was discharged on 18 units of glargine. He has slowly been able to take himself of insulin, he last gave himself insulin in May. Despite being of insulin his blood glucose readings have mostly stayed between 80 to 130. He tries to eat healthy and goes for walks 3 to 4 times a week. He states that he has regained his appetite after getting the renal transplant so he has been eating more than he used to. He has gained about 5 to 10 pounds as a result. He would like to lose this weight to become more healthy and is determined to do so. No episodes of hypoglycemia    History of Diabetes  Type 2  Diagnosed more than 20 years ago  Control: adequate  Treatment history: was previously  on glipizide, insulin glargine    Current Treatment: metformin 1 g twice daily    HbA1c was 6.3 on 7/15/2020    Glucometer summary:     Average blood glucose checks per day: 1    Average:     Hypoglycemic episodes: none    Diet:     has three meals daily, sometimes snacks in between meals as well. Cooks at home    Steroids: none    Exercise :  walks three days a week for about 30 to 40 minutes    Tobacco use:  no    Complications:    Retinopathy: absent, according to the patient. Last eye exam was one month ago    Nephropathy: ESRD due to diabetic nephropathy s/p renal transplant    Neuropathy: denies symptoms. Unable to do monofilament test    Foot ulcers: none    CAD: ischemic cardiomyopathy s/p heart transplant    Lipids: on statin therapy    Hypertension: off antihypertensives now    Infections: none    Endocrine review of system:    -Denies any polyuria, nocturia, excessive thirst or know sodium issues  -Denies polydipsia, polyphagia, blurred vision.   -Denies diarrhea or constipation or foul smelling stools or floating fatty stools  -Denies history of diarrhea, peptic ulcer disease or bleeding   -Denies episodic headache or unilateral headaches, denies any flushing or palpitation symptoms associated with that headache  -Denies any known thyroid issues  -Denies history of nipple discharge or gynecomastia or taking antifungal drugs  -Denies any known adrenal issues or low energy on a daily basis or nausea, vomiting or new abdominal pain.  -Denies skin changes, skin stretching or tanning  -Denies pigmentation in elbows, palate or mucosa  -Denies weight changes  -Denies muscle cramps or constipation      ROS:  All 12 systems were reviewed and negative except as mentioned in HPI    Past Medical/Surgical History:  Past Medical History:   Diagnosis Date     (HFpEF) heart failure with preserved ejection fraction (H)      Allergic rhinitis, cause unspecified      Anemia of chronic kidney failure      AS (aortic  stenosis)      Ascending aortic aneurysm (H)      Bicuspid aortic valve      CAD (coronary artery disease)      Congestive heart failure, unspecified      Dialysis patient (H)     Tues-Thur-Sat     Dyslipidemia      Esophageal reflux      ESRD (end stage renal disease) (H)      Hearing problem      Heart replaced by transplant (H) 12/10/2018     Hypersomnia with sleep apnea, unspecified      Hypertension      Ileostomy status (H)      Immunosuppression (H)      MGUS (monoclonal gammopathy of unknown significance)      Mitral regurgitation      SHEELA (obstructive sleep apnea)     No CPAP     Pneumonia      Systolic heart failure (H)      Type 2 diabetes mellitus (H)      Past Surgical History:   Procedure Laterality Date     CARDIAC SURGERY       COLONOSCOPY N/A 5/3/2018    Procedure: COLONOSCOPY;  colonoscopy ;  Surgeon: Ammon Castillo MD;  [facility]: [facility]     CREATE FISTULA ARTERIOVENOUS UPPER EXTREMITY BOVINE Left 5/8/2019    Procedure: Left Upper Extremity Arteriovenous Bovine Graft Creation;  Surgeon: Calin Chenye MD;  [facility]     CV CORONARY ANGIOGRAM N/A 6/28/2019    Procedure: CV CORONARY ANGIOGRAM;  Surgeon: Montrell Posada MD;  [facility]     CV CORONARY ANGIOGRAM N/A 7/15/2020    Procedure: CV CORONARY ANGIOGRAM;  Surgeon: Marcelo Ramírez MD;  [facility]     CV HEART BIOPSY N/A 2/1/2019    Procedure: HBX;  Surgeon: Montrell Posada MD;  [facility]     CV HEART BIOPSY N/A 3/22/2019    Procedure: HBX, RIJV ACCESS;  Surgeon: Jordan Fox MD;  [facility]     CV HEART BIOPSY N/A 6/28/2019    Procedure: CV HEART BIOPSY;  Surgeon: Montrell Posada MD;  [facility]     CV HEART BIOPSY N/A 10/28/2019    Procedure: CV HEART BIOPSY;  Surgeon: Marcelo Ramírez MD;  [facility]     CV HEART BIOPSY N/A 2/6/2020    Procedure: CV  HEART BIOPSY;  Surgeon: Montrell Posada MD;  [facility]     CV HEART BIOPSY N/A 7/15/2020    Procedure: CV HEART BIOPSY;  Surgeon: Marcelo Ramírez MD;  Location:  HEART CARDIAC CATH LAB     CV RIGHT HEART CATH N/A 6/28/2019    Procedure: CV RIGHT HEART CATH;  Surgeon: Montrell Posada MD;  Location:  HEART CARDIAC CATH LAB     CV RIGHT HEART CATH N/A 7/15/2020    Procedure: CV RIGHT HEART CATH;  Surgeon: Marcelo Ramírez MD;  Location:  HEART CARDIAC CATH LAB     ESOPHAGOSCOPY, GASTROSCOPY, DUODENOSCOPY (EGD), COMBINED N/A 5/7/2018    Procedure: COMBINED ENDOSCOPIC ULTRASOUND, ESOPHAGOSCOPY, GASTROSCOPY, DUODENOSCOPY (EGD), FINE NEEDLE ASPIRATE/BIOPSY;  Endoscopic Ultrasound with Fine Needle Aspiration ;  Surgeon: Alon Don MD;  [facility]     EXAM UNDER ANESTHESIA RECTUM N/A 8/12/2018    Procedure: EXAM UNDER ANESTHESIA RECTUM;  EXAM UNDER ANESTHESIA RECTUM ,COMBINED INCISION AND DRAINAGE OF RECTAL ABCESS ;  Surgeon: Rick Tran MD;  [facility]     INCISION AND DRAINAGE RECTUM, COMBINED N/A 8/12/2018    Procedure: COMBINED INCISION AND DRAINAGE RECTUM;;  Surgeon: Rick Tran MD;  [facility]     IR DIALYSIS FISTULOGRAM LEFT  9/25/2019     IR DIALYSIS FISTULOGRAM LEFT  11/22/2019     IR DIALYSIS PTA  9/25/2019     IR DIALYSIS PTA  11/22/2019     LAPAROSCOPIC ASSISTED COLOSTOMY TAKEDOWN N/A 12/11/2018    Procedure: Laparoscopic Assisted Colostomy Takedown, Laparoscopic Lysis of Adhesions;  Surgeon: Rick Tran MD;  [facility]     LAPAROSCOPIC ASSISTED SIGMOID COLECTOMY N/A 8/14/2018    Procedure: LAPAROSCOPIC ASSISTED SIGMOID COLECTOMY;  Laparoscopic Hand Assisted Takedown of Splenic Flexure, Sigmoidectomy, Small Bowel Resection, Takedown of Small Bowel to Colon Fistula;  Surgeon: Rick Tran MD;  [facility]     LAPAROSCOPIC HERNIORRHAPHY INGUINAL BILATERAL Bilateral  7/24/2015    Procedure: LAPAROSCOPIC HERNIORRHAPHY INGUINAL BILATERAL;  Surgeon: Bobby Mcconnell MD;  [facility]     LAPAROSCOPIC INSERTION CATHETER PERITONEAL DIALYSIS N/A 6/22/2017    Procedure: LAPAROSCOPIC INSERTION CATHETER PERITONEAL DIALYSIS;  Laparoscopic Peritoneal Dialysis Catheter Placement - Anesthesia with block;  Surgeon: Esteban Arvizu MD;  [facility]     PICC INSERTION Left 04/22/2018    5Fr - 49cm (3cm external), Basilic vein, low SVC     REMOVE CATHETER PERITONEAL Right 1/15/2018    Procedure: REMOVE CATHETER PERITONEAL;  Open Removal of Peritoneal Dialysis Catheter ;  Surgeon: Esteban Arvizu MD;  [facility]     SIGMOIDOSCOPY FLEXIBLE N/A 11/21/2018    Procedure: Examination Under Anesthesia, Flexible Sigmoidoscopy and Polypectomy;  Surgeon: Rick Tran MD;  [facility]     SIGMOIDOSCOPY FLEXIBLE N/A 12/11/2018    Procedure: Flexible Sigmoidoscopy;  Surgeon: Rick Tran MD;  [facility]     TAKEDOWN ILEOSTOMY N/A 3/27/2019    Procedure: Takedown Ileostomy;  Surgeon: Rick Tran MD;  [facility]     TRANSPLANT HEART RECIPIENT N/A 6/14/2018    Procedure: TRANSPLANT HEART RECIPIENT;  Median Sternotomy, on-pump oxygenator, Heart Transplant;  Surgeon: Rony Caputo MD;  [facility]     TRANSPLANT KIDNEY RECIPIENT LIVING UNRELATED  12/2019       Allergies:  Allergies   Allergen Reactions     Norco [Hydrocodone-Acetaminophen] Nausea and Vomiting     Cats      Throat tightness     Isosorbide Other (See Comments)     hypotension     Penicillins Hives     Seasonal Allergies      rhinitis     Shrimp      Throat closes        PTA Meds:  Prior to Admission medications    Medication Sig Last Dose Taking? Auth Provider   ACCU-CHEK GUIDE test strip USE TO TEST BLOOD SUGAR 2 TO 3 TIMES DAILY OR AS DIRECTED Taking Yes Yahir Turcios MD   acetaminophen (TYLENOL) 325 MG tablet Take 2 tablets (650 mg) by mouth every 4 hours  as needed for other (multimodal surgical pain management along with NSAIDS and opioid medication as indicated based on pain control and physical function.) Taking Yes Fatimah Borja PA-C   aspirin 81 MG EC tablet Take 81 mg by mouth daily Taking Yes Reported, Patient   atorvastatin (LIPITOR) 40 MG tablet Take 1 tablet (40 mg) by mouth daily Taking Yes Klever Ernst MD   biotin 1000 MCG TABS tablet Take 1,000 mcg by mouth daily Patient takes every other day Taking Yes Reported, Patient   blood glucose monitoring (ACCU-CHEK FASTCLIX) lancets USE TO TEST 2-3 TIMES DAILY OR AS DIRECTED. Taking Yes Emilia Knutson MD   famotidine (PEPCID) 20 MG tablet Take 1 tablet (20 mg) by mouth 2 times daily Taking Yes Yahir Turcios MD   fluticasone (FLONASE) 50 MCG/ACT nasal spray Spray 1 spray into both nostrils daily Taking Yes Ana Luisa Coleman APRN CNP   Lactobacillus (ACIDOPHILUS PO) 4 billion active cultures Taking Yes Reported, Patient   loratadine (CLARITIN) 10 MG tablet Take 10 mg by mouth daily Patient takes at bedtime Taking Yes Reported, Patient   magnesium oxide (MAG-OX) 400 MG tablet Total dose = 800 mg at lunch time and 400 mg at dinner time Taking Yes Gilberto Ulloa MD   metFORMIN (GLUCOPHAGE-XR) 500 MG 24 hr tablet Take 4 tablets (2,000 mg) by mouth daily (with breakfast) Taking Yes Yahir Turcios MD   Multiple Vitamin (DAILY-DONY) TABS TAKE 1 TABLET BY MOUTH DAILY Taking Yes Yahir Turcios MD   mycophenolate (GENERIC EQUIVALENT) 250 MG capsule Take 3 capsules (750 mg) by mouth 2 times daily Taking Yes Giovany Quijano MD   pantoprazole (PROTONIX) 20 MG EC tablet Take 40 mg by mouth every other day Taking Yes Reported, Patient   sodium bicarbonate 650 MG tablet Take 2 tablets (1,300 mg) by mouth daily Taking Yes Giovany Quijano MD   sulfamethoxazole-trimethoprim (BACTRIM/SEPTRA) 400-80 MG tablet Take 1 tablet by mouth daily Taking Yes Bobby Escobar,  MD   tacrolimus (GENERIC EQUIVALENT) 1 MG capsule Take 1 capsule (1 mg) by mouth 2 times daily Taking Yes Gilberto Ulloa MD   Vitamin D3 (CHOLECALCIFEROL) 25 mcg (1000 units) tablet Take 1 tablet (25 mcg) by mouth daily Taking Yes Giovany Quijano MD   insulin pen needle (B-D U/F) 31G X 5 MM miscellaneous Use 1 daily as directed.  Patient not taking: Reported on 2020 Not Taking  Yahir Turcios MD   tacrolimus (GENERIC EQUIVALENT) 0.5 MG capsule HOLD  Patient not taking: Reported on 2020 Not Taking  Gilberto Ulloa MD        Current Medications:   Current Outpatient Medications   Medication     ACCU-CHEK GUIDE test strip     acetaminophen (TYLENOL) 325 MG tablet     aspirin 81 MG EC tablet     atorvastatin (LIPITOR) 40 MG tablet     biotin 1000 MCG TABS tablet     blood glucose monitoring (ACCU-CHEK FASTCLIX) lancets     famotidine (PEPCID) 20 MG tablet     fluticasone (FLONASE) 50 MCG/ACT nasal spray     Lactobacillus (ACIDOPHILUS PO)     loratadine (CLARITIN) 10 MG tablet     magnesium oxide (MAG-OX) 400 MG tablet     metFORMIN (GLUCOPHAGE-XR) 500 MG 24 hr tablet     Multiple Vitamin (DAILY-DONY) TABS     mycophenolate (GENERIC EQUIVALENT) 250 MG capsule     pantoprazole (PROTONIX) 20 MG EC tablet     sodium bicarbonate 650 MG tablet     sulfamethoxazole-trimethoprim (BACTRIM/SEPTRA) 400-80 MG tablet     tacrolimus (GENERIC EQUIVALENT) 1 MG capsule     Vitamin D3 (CHOLECALCIFEROL) 25 mcg (1000 units) tablet     insulin pen needle (B-D U/F) 31G X 5 MM miscellaneous     tacrolimus (GENERIC EQUIVALENT) 0.5 MG capsule     No current facility-administered medications for this visit.      Facility-Administered Medications Ordered in Other Visits   Medication     diatrizoate meglumine-sodium (GASTROGRAFIN/GASTROVIEW) 66-10 % solution 480 mL       Family History:  Family History   Problem Relation Age of Onset     C.A.D. Father          from-never knew father-age 60     Diabetes Father       Cerebrovascular Disease Father      Hypertension Father      Hypertension No family hx of      Breast Cancer No family hx of      Cancer - colorectal No family hx of      Prostate Cancer No family hx of      Kidney Disease No family hx of      Melanoma No family hx of      Skin Cancer No family hx of        Social History:  Social History     Tobacco Use     Smoking status: Former Smoker     Packs/day: 1.00     Years: 20.00     Pack years: 20.00     Types: Cigarettes     Start date: 1984     Last attempt to quit: 1994     Years since quittin.9     Smokeless tobacco: Never Used   Substance Use Topics     Alcohol use: No     Alcohol/week: 0.0 standard drinks         Physical examination:  General:  Healthy, alert and oriented X3, NAD, answering questions appropriately.  Pulmonary: No audible wheeze or cough. Able to speak fully in complete sentences.   Neurological: Alert. Mentation intact and speech normal.  Psychological: mentation appears normal, affect normal/bright, judgement and insight intact, normal speech  Physical exam is limited due to virtual visit related to COVID-19     Endocrine Labs:  ENDO DIABETES Latest Ref Rng & Units 2020 7/15/2020   HEMOGLOBIN A1C 0 - 5.6 % 6.1 (H) 6.3 (H)   HGB 13.3 - 17.7 g/dL 13.5 12.9 (L)   GLUCOSE 70 - 99 mg/dL 116 (H) 112 (H)     ENDO DIABETES Latest Ref Rng & Units 2020 7/15/2020   CHOLESTEROL <200 mg/dL 104 108   LDL CHOLESTEROL, CALCULATED <100 mg/dL 27 23   LDL CHOLESTEROL DIRECT 0 - 129 mg/dL     HDL CHOLESTEROL >39 mg/dL 43 42   VLDL-CHOLESTEROL 0 - 30 mg/dL     NON HDL CHOLESTEROL <130 mg/dL 61 66   TRIGLYCERIDES <150 mg/dL 166 (H) 217 (H)        Assessment and Plan:   1. Long-standing type II diabetes mellitus, non-insulin-dependent: currently on metformin thousand milligrams BID. Insulin discontinued in May. Has ESRD secondary to diabetic nephropathy s/p renal transplant in 2019. Last HbA1c in 2020 was 6.3    Morning glucose  readings are within target range    Will continue him on the same regimen for now    Immunosuppressive drugs might affect his glycemic control    Patient has been counseled to continue with healthy lifestyle measures including increasing physical activity    2. Diabetes complications:    Ischemic cardiomyopathy s/p orthotopic heart transplant in 2018: on aspirin and statin therapy    Hyperlipidemia: on statin therapy    Diabetic nephropathy s/p renal transplant in 2019: following up with nephrologist    3. Lipids: has ASCVD. On statin therapy     4. Calcium supplementation: has been encouraged to increase intake of food products with high calcium content    Will also prescribed him Citrical supplementation to improve bone health    Due to the COVID 19 pandemic this visit was a video visit in order to help prevent spread of infection in this high risk patient and the general population. The patient gave verbal consent for the visit today.   Platform used: [employer]  Start time: 0900  Stop time: 1000  Total time: 60 minutes    Case discussed with MD Briana Disla MD  Endocrinology Fellow  Pager number [phone]    --- Addendum----  I saw the patient with endocrine fellow Dr. Hogue and directly discussed with patient. Agree above note and plan.          Mariangel Merida MD  Staff Physician  Endocrinology and Metabolism  [facility]  License: [id number]  Pager: [phone]

## 2020-08-06 DIAGNOSIS — Z94.0 KIDNEY REPLACED BY TRANSPLANT: ICD-10-CM

## 2020-08-07 RX ORDER — LANCETS
EACH MISCELLANEOUS
Qty: 102 EACH | Refills: 5 | Status: SHIPPED | OUTPATIENT
Start: 2020-08-07 | End: 2020-08-31

## 2020-08-10 DIAGNOSIS — Z94.1 HEART REPLACED BY TRANSPLANT (H): ICD-10-CM

## 2020-08-10 DIAGNOSIS — Z79.899 LONG TERM USE OF DRUG: ICD-10-CM

## 2020-08-10 DIAGNOSIS — Z94.0 KIDNEY REPLACED BY TRANSPLANT: ICD-10-CM

## 2020-08-10 LAB
TACROLIMUS BLD-MCNC: 10.8 UG/L (ref 5–15)
TME LAST DOSE: 920 H

## 2020-08-10 PROCEDURE — 87799 DETECT AGENT NOS DNA QUANT: CPT | Performed by: INTERNAL MEDICINE

## 2020-08-10 PROCEDURE — 80197 ASSAY OF TACROLIMUS: CPT | Performed by: INTERNAL MEDICINE

## 2020-08-11 ENCOUNTER — TELEPHONE (OUTPATIENT)
Dept: TRANSPLANT | Facility: CLINIC | Age: 65
End: 2020-08-11

## 2020-08-11 DIAGNOSIS — Z94.0 KIDNEY TRANSPLANTED: ICD-10-CM

## 2020-08-11 DIAGNOSIS — Z94.0 KIDNEY REPLACED BY TRANSPLANT: ICD-10-CM

## 2020-08-11 DIAGNOSIS — Z94.1 TRANSPLANTED HEART (H): Primary | ICD-10-CM

## 2020-08-11 DIAGNOSIS — E87.20 METABOLIC ACIDOSIS: ICD-10-CM

## 2020-08-11 RX ORDER — TACROLIMUS 1 MG/1
CAPSULE ORAL
Qty: 30 CAPSULE | Refills: 11 | Status: SHIPPED | OUTPATIENT
Start: 2020-08-11 | End: 2021-08-30

## 2020-08-11 RX ORDER — TACROLIMUS 0.5 MG/1
CAPSULE ORAL
Qty: 6030 CAPSULE | Refills: 11 | Status: SHIPPED | OUTPATIENT
Start: 2020-08-11 | End: 2021-08-30

## 2020-08-12 LAB
BKV DNA # SPEC NAA+PROBE: NORMAL COPIES/ML
BKV DNA SPEC NAA+PROBE-LOG#: NORMAL LOG COPIES/ML
IMMUKNOW IMMUNE CELL FUNCTION: 135 NG/ML
SPECIMEN SOURCE: NORMAL

## 2020-08-19 DIAGNOSIS — Z94.1 TRANSPLANTED HEART (H): ICD-10-CM

## 2020-08-19 DIAGNOSIS — Z94.0 KIDNEY REPLACED BY TRANSPLANT: ICD-10-CM

## 2020-08-19 DIAGNOSIS — Z79.899 LONG TERM USE OF DRUG: ICD-10-CM

## 2020-08-19 LAB
ANION GAP SERPL CALCULATED.3IONS-SCNC: 8 MMOL/L (ref 3–14)
BUN SERPL-MCNC: 20 MG/DL (ref 7–30)
CALCIUM SERPL-MCNC: 9.1 MG/DL (ref 8.5–10.1)
CHLORIDE SERPL-SCNC: 106 MMOL/L (ref 94–109)
CO2 SERPL-SCNC: 25 MMOL/L (ref 20–32)
CREAT SERPL-MCNC: 1.13 MG/DL (ref 0.66–1.25)
ERYTHROCYTE [DISTWIDTH] IN BLOOD BY AUTOMATED COUNT: 13.1 % (ref 10–15)
GFR SERPL CREATININE-BSD FRML MDRD: 68 ML/MIN/{1.73_M2}
GLUCOSE SERPL-MCNC: 195 MG/DL (ref 70–99)
HCT VFR BLD AUTO: 39.2 % (ref 40–53)
HGB BLD-MCNC: 12.6 G/DL (ref 13.3–17.7)
MCH RBC QN AUTO: 29.5 PG (ref 26.5–33)
MCHC RBC AUTO-ENTMCNC: 32.1 G/DL (ref 31.5–36.5)
MCV RBC AUTO: 92 FL (ref 78–100)
PLATELET # BLD AUTO: 160 10E9/L (ref 150–450)
POTASSIUM SERPL-SCNC: 3.8 MMOL/L (ref 3.4–5.3)
RBC # BLD AUTO: 4.27 10E12/L (ref 4.4–5.9)
SODIUM SERPL-SCNC: 138 MMOL/L (ref 133–144)
TACROLIMUS BLD-MCNC: 6 UG/L (ref 5–15)
TME LAST DOSE: NORMAL H
WBC # BLD AUTO: 3.9 10E9/L (ref 4–11)

## 2020-08-19 PROCEDURE — 80197 ASSAY OF TACROLIMUS: CPT | Performed by: INTERNAL MEDICINE

## 2020-08-20 NOTE — RESULT ENCOUNTER NOTE
Tac level 6.   Heart goal 6-8; no dose change   From renal TC: Goal for kidney 4-6 (okay for higher if needed for heart).   Recheck in Sept per renal protocol   Released in My Chart

## 2020-08-23 ENCOUNTER — MYC MEDICAL ADVICE (OUTPATIENT)
Dept: ENDOCRINOLOGY | Facility: CLINIC | Age: 65
End: 2020-08-23

## 2020-08-23 DIAGNOSIS — Z94.0 KIDNEY REPLACED BY TRANSPLANT: ICD-10-CM

## 2020-08-25 DIAGNOSIS — Z94.0 KIDNEY REPLACED BY TRANSPLANT: ICD-10-CM

## 2020-08-25 RX ORDER — MAGNESIUM OXIDE 400 MG/1
TABLET ORAL
Qty: 90 TABLET | Refills: 3 | Status: SHIPPED | OUTPATIENT
Start: 2020-08-25 | End: 2021-01-05

## 2020-08-27 ENCOUNTER — OFFICE VISIT (OUTPATIENT)
Dept: DERMATOLOGY | Facility: CLINIC | Age: 65
End: 2020-08-27
Payer: MEDICARE

## 2020-08-27 DIAGNOSIS — D23.5 DILATED PORE OF WINER OF BACK: ICD-10-CM

## 2020-08-27 DIAGNOSIS — D23.9 DERMATOFIBROMA: Primary | ICD-10-CM

## 2020-08-27 DIAGNOSIS — D22.9 MULTIPLE MELANOCYTIC NEVI: ICD-10-CM

## 2020-08-27 ASSESSMENT — PAIN SCALES - GENERAL: PAINLEVEL: NO PAIN (0)

## 2020-08-27 NOTE — PROGRESS NOTES
[facility] Dermatology Note    Dermatology Problem List:  1. 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 Dilated pore, central back  3. 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 Benign findings: DF, SK, benign nevi  5. Onychomycosis, bleach soaks    Encounter Date: Aug 27, 2020    CC: Skin check    History of Present Illness:  65 year old male presenting for skin check  - has a irritated rough bump on the back, has been present for a while, slightly painful to the touch, was told by a friend to get it checked out  - denies other itching, bleeding, painful, non-healing lesions; denies moles that are changing in size or color; otherwise feeling well  - denies history of tanning bed use, no history of blistering sunburns; uses sunscreen and wears hats and long-sleeved clothing when outside; no personal or family history of skin cancer or melanoma    Past Medical History:   Patient Active Problem List   Diagnosis     Esophageal reflux     Allergic rhinitis     Anemia in chronic renal disease     Coronary artery disease involving native artery of transplanted heart without angina pectoris     Dyslipidemia     MGUS (monoclonal gammopathy of unknown significance)     Ascending aortic aneurysm (H)     Sigmoid diverticulitis     Heart replaced by transplant (H)     Aortic stenosis     Status post coronary angiogram     Immunosuppression (H)     Type 2 diabetes mellitus (H)     Pancreatic cyst     Kidney replaced by transplant     Aftercare following organ transplant     HTN, kidney transplant related     Secondary renal hyperparathyroidism (H)     Vitamin D deficiency     Hypomagnesemia     SHEELA on CPAP     Need for pneumocystis prophylaxis     Past Medical History:   Diagnosis Date     (HFpEF) heart failure with preserved ejection fraction (H)      Allergic rhinitis, cause unspecified      Anemia of chronic kidney failure      AS (aortic stenosis)       Ascending aortic aneurysm (H)      Bicuspid aortic valve      CAD (coronary artery disease)      Congestive heart failure, unspecified      Dialysis patient (H)     Tues-Thur-Sat     Dyslipidemia      Esophageal reflux      ESRD (end stage renal disease) (H)      Hearing problem      Heart replaced by transplant (H) 12/10/2018     Hypersomnia with sleep apnea, unspecified      Hypertension      Ileostomy status (H)      Immunosuppression (H)      MGUS (monoclonal gammopathy of unknown significance)      Mitral regurgitation      SHEELA (obstructive sleep apnea)     No CPAP     Pneumonia      Systolic heart failure (H)      Type 2 diabetes mellitus (H)      Past Surgical History:   Procedure Laterality Date     CARDIAC SURGERY       COLONOSCOPY N/A 5/3/2018    Procedure: COLONOSCOPY;  colonoscopy ;  Surgeon: Ammon Castillo MD;  Location: [facility]     CREATE FISTULA ARTERIOVENOUS UPPER EXTREMITY BOVINE Left 5/8/2019    Procedure: Left Upper Extremity Arteriovenous Bovine Graft Creation;  Surgeon: Calin Cheney MD;  [facility]     CV CORONARY ANGIOGRAM N/A 6/28/2019    Procedure: CV CORONARY ANGIOGRAM;  Surgeon: Montrell Posada MD;  [facility]     CV CORONARY ANGIOGRAM N/A 7/15/2020    Procedure: CV CORONARY ANGIOGRAM;  Surgeon: Marcelo Ramírez MD;  [facility]     CV HEART BIOPSY N/A 2/1/2019    Procedure: HBX;  Surgeon: Montrell Posada MD;  [facility]     CV HEART BIOPSY N/A 3/22/2019    Procedure: HBX, RIJV ACCESS;  Surgeon: Jordan Fox MD;  [facility]     CV HEART BIOPSY N/A 6/28/2019    Procedure: CV HEART BIOPSY;  Surgeon: Montrell Posada MD;  [facility]     CV HEART BIOPSY N/A 10/28/2019    Procedure: CV HEART BIOPSY;  Surgeon: Marcelo Ramírez MD;  [facility]     CV HEART BIOPSY N/A 2/6/2020    Procedure: CV HEART BIOPSY;   Surgeon: Montrell Posada MD;  [facility]     CV HEART BIOPSY N/A 7/15/2020    Procedure: CV HEART BIOPSY;  Surgeon: Marcelo Ramírez MD;  Location:  HEART CARDIAC CATH LAB     CV RIGHT HEART CATH N/A 6/28/2019    Procedure: CV RIGHT HEART CATH;  Surgeon: Montrell Posada MD;  Location:  HEART CARDIAC CATH LAB     CV RIGHT HEART CATH N/A 7/15/2020    Procedure: CV RIGHT HEART CATH;  Surgeon: Marcelo Ramírez MD;  Location:  HEART CARDIAC CATH LAB     ESOPHAGOSCOPY, GASTROSCOPY, DUODENOSCOPY (EGD), COMBINED N/A 5/7/2018    Procedure: COMBINED ENDOSCOPIC ULTRASOUND, ESOPHAGOSCOPY, GASTROSCOPY, DUODENOSCOPY (EGD), FINE NEEDLE ASPIRATE/BIOPSY;  Endoscopic Ultrasound with Fine Needle Aspiration ;  Surgeon: Alon Don MD;  [facility]     EXAM UNDER ANESTHESIA RECTUM N/A 8/12/2018    Procedure: EXAM UNDER ANESTHESIA RECTUM;  EXAM UNDER ANESTHESIA RECTUM ,COMBINED INCISION AND DRAINAGE OF RECTAL ABCESS ;  Surgeon: Rick Tran MD;  [facility]     INCISION AND DRAINAGE RECTUM, COMBINED N/A 8/12/2018    Procedure: COMBINED INCISION AND DRAINAGE RECTUM;;  Surgeon: Rick Tran MD;  [facility]     IR DIALYSIS FISTULOGRAM LEFT  9/25/2019     IR DIALYSIS FISTULOGRAM LEFT  11/22/2019     IR DIALYSIS PTA  9/25/2019     IR DIALYSIS PTA  11/22/2019     LAPAROSCOPIC ASSISTED COLOSTOMY TAKEDOWN N/A 12/11/2018    Procedure: Laparoscopic Assisted Colostomy Takedown, Laparoscopic Lysis of Adhesions;  Surgeon: Rick Tran MD;  [facility]     LAPAROSCOPIC ASSISTED SIGMOID COLECTOMY N/A 8/14/2018    Procedure: LAPAROSCOPIC ASSISTED SIGMOID COLECTOMY;  Laparoscopic Hand Assisted Takedown of Splenic Flexure, Sigmoidectomy, Small Bowel Resection, Takedown of Small Bowel to Colon Fistula;  Surgeon: Rick Tran MD;  [facility]     LAPAROSCOPIC HERNIORRHAPHY INGUINAL BILATERAL Bilateral 7/24/2015    Procedure:  LAPAROSCOPIC HERNIORRHAPHY INGUINAL BILATERAL;  Surgeon: Bobby Mcconnell MD;  [facility]     LAPAROSCOPIC INSERTION CATHETER PERITONEAL DIALYSIS N/A 2017    Procedure: LAPAROSCOPIC INSERTION CATHETER PERITONEAL DIALYSIS;  Laparoscopic Peritoneal Dialysis Catheter Placement - Anesthesia with block;  Surgeon: Esteban Arvizu MD;  [facility]     PICC INSERTION Left 2018    5Fr - 49cm (3cm external), Basilic vein, low SVC     REMOVE CATHETER PERITONEAL Right 1/15/2018    Procedure: REMOVE CATHETER PERITONEAL;  Open Removal of Peritoneal Dialysis Catheter ;  Surgeon: Esteban Arvizu MD;  [facility]     SIGMOIDOSCOPY FLEXIBLE N/A 2018    Procedure: Examination Under Anesthesia, Flexible Sigmoidoscopy and Polypectomy;  Surgeon: Rick Tran MD;  [facility]     SIGMOIDOSCOPY FLEXIBLE N/A 2018    Procedure: Flexible Sigmoidoscopy;  Surgeon: Rick Tran MD;  [facility]     TAKEDOWN ILEOSTOMY N/A 3/27/2019    Procedure: Takedown Ileostomy;  Surgeon: Rick Tran MD;  [facility]     TRANSPLANT HEART RECIPIENT N/A 2018    Procedure: TRANSPLANT HEART RECIPIENT;  Median Sternotomy, on-pump oxygenator, Heart Transplant;  Surgeon: Rony Caputo MD;  [facility]     TRANSPLANT KIDNEY RECIPIENT LIVING UNRELATED  2019       Social History:  Patient reports that he quit smoking about 26 years ago. His smoking use included cigarettes. He started smoking about 36 years ago. He has a 20.00 pack-year smoking history. He has never used smokeless tobacco. He reports that he does not drink alcohol or use drugs.    Family History:  Family History   Problem Relation Age of Onset     C.A.D. Father          from-never knew father-age 60     Diabetes Father      Cerebrovascular Disease Father      Hypertension Father      Hypertension No family hx of      Breast Cancer No family hx of      Cancer - colorectal No family hx of       Prostate Cancer No family hx of      Kidney Disease No family hx of      Melanoma No family hx of      Skin Cancer No family hx of        Medications:  Current Outpatient Medications   Medication Sig Dispense Refill     ACCU-CHEK GUIDE test strip USE TO TEST BLOOD SUGAR 2 TO 3 TIMES DAILY OR AS DIRECTED 100 strip 3     acetaminophen (TYLENOL) 325 MG tablet Take 2 tablets (650 mg) by mouth every 4 hours as needed for other (multimodal surgical pain management along with NSAIDS and opioid medication as indicated based on pain control and physical function.) 50 tablet 1     aspirin 81 MG EC tablet Take 81 mg by mouth daily       atorvastatin (LIPITOR) 40 MG tablet Take 1 tablet (40 mg) by mouth daily 90 tablet 3     biotin 1000 MCG TABS tablet Take 1,000 mcg by mouth daily Patient takes every other day       blood glucose monitoring (ACCU-CHEK FASTCLIX) lancets USE TO TEST 2-3 TIMES DAILY OR AS DIRECTED. 102 each 5     famotidine (PEPCID) 20 MG tablet Take 1 tablet (20 mg) by mouth 2 times daily 60 tablet 3     fluticasone (FLONASE) 50 MCG/ACT nasal spray Spray 1 spray into both nostrils daily 11.1 mL 0     Lactobacillus (ACIDOPHILUS PO) 4 billion active cultures       loratadine (CLARITIN) 10 MG tablet Take 10 mg by mouth daily Patient takes at bedtime       magnesium oxide (MAG-OX) 400 MG tablet Total dose = 800 mg at lunch time and 400 mg at dinner time 90 tablet 3     metFORMIN (GLUCOPHAGE-XR) 500 MG 24 hr tablet Take 4 tablets (2,000 mg) by mouth daily (with breakfast) 360 tablet 1     Multiple Vitamin (DAILY-DONY) TABS TAKE 1 TABLET BY MOUTH DAILY 90 tablet 3     mycophenolate (GENERIC EQUIVALENT) 250 MG capsule Take 3 capsules (750 mg) by mouth 2 times daily 180 capsule 11     pantoprazole (PROTONIX) 20 MG EC tablet Take 40 mg by mouth every other day       sodium bicarbonate 650 MG tablet Take 2 tablets (1,300 mg) by mouth daily 120 tablet 2     sulfamethoxazole-trimethoprim (BACTRIM/SEPTRA) 400-80 MG  tablet Take 1 tablet by mouth daily 30 tablet 11     tacrolimus (GENERIC EQUIVALENT) 0.5 MG capsule Take ONE cap every PM (0.5 mg every evening) 6030 capsule 11     tacrolimus (GENERIC EQUIVALENT) 1 MG capsule Take ONE cap every AM (1 mg every morning) 30 capsule 11     Vitamin D3 (CHOLECALCIFEROL) 25 mcg (1000 units) tablet Take 1 tablet (25 mcg) by mouth daily 30 tablet 1        Allergies:  Allergies   Allergen Reactions     Norco [Hydrocodone-Acetaminophen] Nausea and Vomiting     Cats      Throat tightness     Isosorbide Other (See Comments)     hypotension     Penicillins Hives     Seasonal Allergies      rhinitis     Shrimp      Throat closes        Review of Systems:  Constitutional: otherwise feeling well today, in usual state of health.  HEENT: patient denies nonhealing oral sores.    Physical exam:  Vitals: There were no vitals taken for this visit.  General: in no acute distress, well-developed, well-nourished  Eyes: conjunctivae clear  Pulmonary: breathing comfortably in no distress  CV: well-perfused, no cyanosis  Extremities: no deformity, no edema    Skin: Skin examined including face, neck, chest, back, abdomen, arms, legs, buttocks  - skin type: medium brown  - mid-back: circular depressed crateriform papule with central black keratinaceous debris  - back, chest: waxy stuck-on tan to brown macules and papules  - back: well-defined, symmetric brown pigmented macules and papules with a reassuring pattern on dermoscopy  - back: diffuse regular brown pigmented macules  - abdomen, legs: firm flesh-colored papule that dimples with lateral pressure    Impression/Plan:    1. Dilated pore of Elijah and underlying epidermal inclusion cyst  - educated on the etiology and benign nature of the lesions and risks of benefits of various treatment options including monitoring, excision, and intralesional steroid injection  - keratinaceous debris was extracted with firm vertical pressure and removed with gauze;  Vaseline applied  - counseled regarding high likelihood of recurrence and will monitor    2. Benign skin findings include seborrheic keratosis, benign nevi, lentigines, dermatofibroma  - lesions benign nature, no treatment is indicated at this time, will monitor for any clinical changes  - ABCD's of melanoma and signs of possible non-melanoma skin cancer were reviewed with patient    3. Immunosuppression due to transplant status  - counseled on increased risk of skin cancer  - sun protection reviewed    Follow-up in 1-2 years for skin exam    Faculty: Dr. Roche    Staff Involved:  Resident/Staff    Martina Simons MD  Dermatology Resident  [facility]  I, Carrie Roche MD, saw this patient with the resident and agree with the resident s findings and plan of care as documented in the resident s note.

## 2020-08-27 NOTE — PATIENT INSTRUCTIONS
"Sunscreen   What does \"broad spectrum mean\"?   The best sunscreens protect against all UVB (Burning) rays and UVA (Aging, cAncer, tAnning) rays.    What does SPF mean?   SPF stands for  Sun Protection Factor  and represents the ability to screen only UVB (burning) rays. UVB rays are mostly blocked in all sunscreens, but only those that contain titanium dioxide, zinc oxide, mexoryl or Parsol 1789 (avobenzone) block the UVA spectrum. Zinc oxide is the best of all. Even though a sunscreen is labeled  UVA/UVB Protection  that is not entirely accurate because even partial protection allows this label!    What SPF should I chose?   Aim to get a sunscreen that is at least sun protection factor (SPF) 30. SPF 15 provides about 92-93% coverage, SPF 30 about 95-97% coverage, and SPF 45 about 98% coverage. That is to say, SPF 30 is not twice as good as SPF 15; think of it as a curve graph. If covering your whole body, you should be using 30 grams, or one ounce, which is how much is in one shot glass! That s a THICK layer!    UVA BLOCKERS:   Make sure your sunscreen has one of these active ingredients!   Everything else in the  active ingredients  box of a sunscreen label blocks UVB only.   Zinc Oxide (preferred)   Titanium dioxide   Parsol 1789 (avobenzone)   Elta MD: available at [facility] and [facility] pharmacy  Mexoryl   Examples of some good sunscreens*   [email or web address]-[email or web address]   Aveeno   Baby Lynco sunscreens   Naga   Blue Lizard   BullFrog   CaliforniaBaby   Coppertone Spectra3   [facility]   Tish   Fallene/TotalBlock   Neutrogena   NoAd   Vanicream   WaterBabies  Sticks work great, like Neutrogena pure and free baby SPF 60 stick    Darker Skin Types & Sunscreen  Patients with darker skin colors tend to like tinted sunscreens better as they appear less chalky on the skin. Many BB Creams are now available but make sure the sunscreen SPF is at least 30! Here are some brands of tinted sunscreens:  Neutrogenia Tinted  Venus " (sold at [employer])  La Roche Posay Anthelios 60 Ultra Light Sunscreen Fluid  Cerave Sunscreen SPF 50 Face Lotion Invisible Zinc  Clarins UV Plus sunscreen Multiprotection Tint  Dr Brooks + Every Sun Day sunscreen  Coppertone ClearlySheer Lotion SPF 50  Up & Up (Target) Sheer Dry-touch Lotion SPF 30  Palmers Cocoa Butter Formula Evertone Suncare Sunscreen Stick SPF 30  Per-fect Beauty skin Perfection Plus with SPF 30  Junetics Pure Energy Brightening Day Cream with Broad Spectrum SPF 50  Clinque SPF 30 Mineral Sunscreen Fluid for Face  Ivan Multicorrection 5 in 1 Chest, Neck and Face Cream with SPF 30  Cover FX Clear Cover Invisible Sunscreen Broad Spectrum SPF 30  Tierney Harriett Age Perfect Hydra-Nutrition Facial Oil SPF 30  Solbar Shield SPF 40  Tropical Sands All Natural SPF 50  * Note, this list is not meant to be comprehensive, just trying to pull together some names that might be helpful to you. If they are not at a local store, they can be found on-line for order. EACH NAMEBRAND MAKES MULTIPLE TYPES SO YOU STILL HAVE TO CHECK FOR ONE OF THE FOUR INGREDIENTS LISTED IN THE LEFT COLUMN!!!   Combination sunscreen-insect repellants are not recommended as sunscreen needs to be reapplied every 2 hours; insect repellant does not, and that would lead to too much DEET exposure.   Sunscreen is not recommended for infants under the age of 6 months. Use clothing, shade and sun avoidance for small infants. Sunscreen clothing and hats are also important for people of all ages. Note that [email or web address]* is a company based out of [place], [place]! Other good items can be found in stores and on-line.   Sunscreen sprays are okay for RE-APPLICATION only. Most people do not spray enough on to get good enough protection. Recommendation: Use a lotion-based sunscreen to get a good first/base layer.   Vitamin D: We get vitamin D through the skin. If you do not get enough sun in the summer to get tan lines, you should take a vitamin D  supplement: 400units for children and 1000units for adults per day.   Good daily facial moisturizers with sunscreen:   [facility] Block Sheer   Anthelios by LaRochePosay   Oil of Olay Complete Defense for sensitive skin   Sunscreen Recommendations for Sensitive Skin    The following sunscreens may be better for your child's sensitive skin. The main active ingredients are inert, either titanium dioxide or zinc oxide. These ingredients are less irritating than chemical sunscreens.   1) Aveeno Active Natural Protection Mineral Block Lotion SPF 30   2) Aveeno Baby Natural Protection Face Stick SPF 50+   3) Banana Boat Natural Reflect (baby or kids) SPF 50+   4) Stoddard's Bees Chemical-Free Sunscreen SPF 30   5) Blue Lizard Baby SPF 30+   6) Blue Lizard for Sensitive Skin SPF 30+   7) Cotz Pure SPF 30   8) Cotz Face SPF 40   9) Cotz 20% Zinc SPF 35   10) CVS Sensitive Skin SPF 30   11) CVS Baby Lotion Sunscreen SPF 60+   12) Mustella Broad Spectrum SPF 50+/Mineral Sunscreen Stick   13) Neutrogena Sensitive Skin SPF 30   14) Neutrogena Sensitive Skin SPF 60+   15) PreSun Sensitive Sunblock SPF 28   16) Vanicream Sunscreen for Sensitive Skin SPF 60   17) Walgreen's Sensitive Skin SPF 70   Many local pharmacies and [facility] carry some of these options or you may purchase them online at [email or web address] or [email or web address].     Sun protective Clothing:  [email or web address]  [email or web address]  [email or web address]

## 2020-08-27 NOTE — LETTER
8/27/2020       RE: Murray Nicholson  [address]  [place] [address]     Dear Colleague,    Thank you for referring your patient, Murray Nicholson, to the [facility] DERMATOLOGY at [facility]. Please see a copy of my visit note below.    [facility] Dermatology Note    Dermatology Problem List:  1. 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 Dilated pore, central back  3. 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 Benign findings: DF, SK, benign nevi  5. Onychomycosis, bleach soaks    Encounter Date: Aug 27, 2020    CC: Skin check    History of Present Illness:  65 year old male presenting for skin check  - has a irritated rough bump on the back, has been present for a while, slightly painful to the touch, was told by a friend to get it checked out  - denies other itching, bleeding, painful, non-healing lesions; denies moles that are changing in size or color; otherwise feeling well  - denies history of tanning bed use, no history of blistering sunburns; uses sunscreen and wears hats and long-sleeved clothing when outside; no personal or family history of skin cancer or melanoma    Past Medical History:   Patient Active Problem List   Diagnosis     Esophageal reflux     Allergic rhinitis     Anemia in chronic renal disease     Coronary artery disease involving native artery of transplanted heart without angina pectoris     Dyslipidemia     MGUS (monoclonal gammopathy of unknown significance)     Ascending aortic aneurysm (H)     Sigmoid diverticulitis     Heart replaced by transplant (H)     Aortic stenosis     Status post coronary angiogram     Immunosuppression (H)     Type 2 diabetes mellitus (H)     Pancreatic cyst     Kidney replaced by transplant     Aftercare following organ transplant     HTN, kidney transplant related     Secondary renal hyperparathyroidism (H)     Vitamin D deficiency      Hypomagnesemia     SHEELA on CPAP     Need for pneumocystis prophylaxis     Past Medical History:   Diagnosis Date     (HFpEF) heart failure with preserved ejection fraction (H)      Allergic rhinitis, cause unspecified      Anemia of chronic kidney failure      AS (aortic stenosis)      Ascending aortic aneurysm (H)      Bicuspid aortic valve      CAD (coronary artery disease)      Congestive heart failure, unspecified      Dialysis patient (H)     [facility]     Dyslipidemia      Esophageal reflux      ESRD (end stage renal disease) (H)      Hearing problem      Heart replaced by transplant (H) 12/10/2018     Hypersomnia with sleep apnea, unspecified      Hypertension      Ileostomy status (H)      Immunosuppression (H)      MGUS (monoclonal gammopathy of unknown significance)      Mitral regurgitation      SHEELA (obstructive sleep apnea)     No CPAP     Pneumonia      Systolic heart failure (H)      Type 2 diabetes mellitus (H)      Past Surgical History:   Procedure Laterality Date     CARDIAC SURGERY       COLONOSCOPY N/A 5/3/2018    Procedure: COLONOSCOPY;  colonoscopy ;  Surgeon: Ammon Castillo MD;  [facility]: [facility]     CREATE FISTULA ARTERIOVENOUS UPPER EXTREMITY BOVINE Left 5/8/2019    Procedure: Left Upper Extremity Arteriovenous Bovine Graft Creation;  Surgeon: Calin Cheney MD;  [facility]     CV CORONARY ANGIOGRAM N/A 6/28/2019    Procedure: CV CORONARY ANGIOGRAM;  Surgeon: Montrell Posada MD;  [facility]     CV CORONARY ANGIOGRAM N/A 7/15/2020    Procedure: CV CORONARY ANGIOGRAM;  Surgeon: Marcelo Ramírez MD;  [facility]     CV HEART BIOPSY N/A 2/1/2019    Procedure: HBX;  Surgeon: Montrell Posada MD;  [facility]     CV HEART BIOPSY N/A 3/22/2019    Procedure: HBX, RIJV ACCESS;  Surgeon: Jordan Fox MD;  [facility]     CV HEART BIOPSY N/A 6/28/2019    Procedure: CV HEART  BIOPSY;  Surgeon: Montrell Posada MD;  Location:  HEART CARDIAC CATH LAB     CV HEART BIOPSY N/A 10/28/2019    Procedure: CV HEART BIOPSY;  Surgeon: Marcelo Ramírez MD;  Location:  HEART CARDIAC CATH LAB     CV HEART BIOPSY N/A 2/6/2020    Procedure: CV HEART BIOPSY;  Surgeon: Montrell Posada MD;  Location:  HEART CARDIAC CATH LAB     CV HEART BIOPSY N/A 7/15/2020    Procedure: CV HEART BIOPSY;  Surgeon: Marcelo Ramírez MD;  Location:  HEART CARDIAC CATH LAB     CV RIGHT HEART CATH N/A 6/28/2019    Procedure: CV RIGHT HEART CATH;  Surgeon: Montrlel Posada MD;  Location:  HEART CARDIAC CATH LAB     CV RIGHT HEART CATH N/A 7/15/2020    Procedure: CV RIGHT HEART CATH;  Surgeon: Marcelo Ramírez MD;  Location:  HEART CARDIAC CATH LAB     ESOPHAGOSCOPY, GASTROSCOPY, DUODENOSCOPY (EGD), COMBINED N/A 5/7/2018    Procedure: COMBINED ENDOSCOPIC ULTRASOUND, ESOPHAGOSCOPY, GASTROSCOPY, DUODENOSCOPY (EGD), FINE NEEDLE ASPIRATE/BIOPSY;  Endoscopic Ultrasound with Fine Needle Aspiration ;  Surgeon: Alon Don MD;  [facility]     EXAM UNDER ANESTHESIA RECTUM N/A 8/12/2018    Procedure: EXAM UNDER ANESTHESIA RECTUM;  EXAM UNDER ANESTHESIA RECTUM ,COMBINED INCISION AND DRAINAGE OF RECTAL ABCESS ;  Surgeon: Rick Tran MD;  [facility]     INCISION AND DRAINAGE RECTUM, COMBINED N/A 8/12/2018    Procedure: COMBINED INCISION AND DRAINAGE RECTUM;;  Surgeon: Rick Tran MD;  [facility]     IR DIALYSIS FISTULOGRAM LEFT  9/25/2019     IR DIALYSIS FISTULOGRAM LEFT  11/22/2019     IR DIALYSIS PTA  9/25/2019     IR DIALYSIS PTA  11/22/2019     LAPAROSCOPIC ASSISTED COLOSTOMY TAKEDOWN N/A 12/11/2018    Procedure: Laparoscopic Assisted Colostomy Takedown, Laparoscopic Lysis of Adhesions;  Surgeon: Rick Tran MD;  [facility]     LAPAROSCOPIC ASSISTED SIGMOID COLECTOMY N/A 8/14/2018    Procedure: LAPAROSCOPIC ASSISTED SIGMOID  COLECTOMY;  Laparoscopic Hand Assisted Takedown of Splenic Flexure, Sigmoidectomy, Small Bowel Resection, Takedown of Small Bowel to Colon Fistula;  Surgeon: Rick Tran MD;  [facility]     LAPAROSCOPIC HERNIORRHAPHY INGUINAL BILATERAL Bilateral 7/24/2015    Procedure: LAPAROSCOPIC HERNIORRHAPHY INGUINAL BILATERAL;  Surgeon: Bobby Mcconnell MD;  [facility]     LAPAROSCOPIC INSERTION CATHETER PERITONEAL DIALYSIS N/A 6/22/2017    Procedure: LAPAROSCOPIC INSERTION CATHETER PERITONEAL DIALYSIS;  Laparoscopic Peritoneal Dialysis Catheter Placement - Anesthesia with block;  Surgeon: Esteban Arvizu MD;  [facility]     PICC INSERTION Left 04/22/2018    5Fr - 49cm (3cm external), Basilic vein, low SVC     REMOVE CATHETER PERITONEAL Right 1/15/2018    Procedure: REMOVE CATHETER PERITONEAL;  Open Removal of Peritoneal Dialysis Catheter ;  Surgeon: Esteban Arvizu MD;  [facility]     SIGMOIDOSCOPY FLEXIBLE N/A 11/21/2018    Procedure: Examination Under Anesthesia, Flexible Sigmoidoscopy and Polypectomy;  Surgeon: Rick Tran MD;  [facility]     SIGMOIDOSCOPY FLEXIBLE N/A 12/11/2018    Procedure: Flexible Sigmoidoscopy;  Surgeon: Rick Tran MD;  [facility]     TAKEDOWN ILEOSTOMY N/A 3/27/2019    Procedure: Takedown Ileostomy;  Surgeon: Rick Tran MD;  [facility]     TRANSPLANT HEART RECIPIENT N/A 6/14/2018    Procedure: TRANSPLANT HEART RECIPIENT;  Median Sternotomy, on-pump oxygenator, Heart Transplant;  Surgeon: Rony Caputo MD;  [facility]     TRANSPLANT KIDNEY RECIPIENT LIVING UNRELATED  12/2019       Social History:  Patient reports that he quit smoking about 26 years ago. His smoking use included cigarettes. He started smoking about 36 years ago. He has a 20.00 pack-year smoking history. He has never used smokeless tobacco. He reports that he does not drink alcohol or use drugs.    Family History:  Family  History   Problem Relation Age of Onset     C.A.D. Father          from-never knew father-age 60     Diabetes Father      Cerebrovascular Disease Father      Hypertension Father      Hypertension No family hx of      Breast Cancer No family hx of      Cancer - colorectal No family hx of      Prostate Cancer No family hx of      Kidney Disease No family hx of      Melanoma No family hx of      Skin Cancer No family hx of        Medications:  Current Outpatient Medications   Medication Sig Dispense Refill     ACCU-CHEK GUIDE test strip USE TO TEST BLOOD SUGAR 2 TO 3 TIMES DAILY OR AS DIRECTED 100 strip 3     acetaminophen (TYLENOL) 325 MG tablet Take 2 tablets (650 mg) by mouth every 4 hours as needed for other (multimodal surgical pain management along with NSAIDS and opioid medication as indicated based on pain control and physical function.) 50 tablet 1     aspirin 81 MG EC tablet Take 81 mg by mouth daily       atorvastatin (LIPITOR) 40 MG tablet Take 1 tablet (40 mg) by mouth daily 90 tablet 3     biotin 1000 MCG TABS tablet Take 1,000 mcg by mouth daily Patient takes every other day       blood glucose monitoring (ACCU-CHEK FASTCLIX) lancets USE TO TEST 2-3 TIMES DAILY OR AS DIRECTED. 102 each 5     famotidine (PEPCID) 20 MG tablet Take 1 tablet (20 mg) by mouth 2 times daily 60 tablet 3     fluticasone (FLONASE) 50 MCG/ACT nasal spray Spray 1 spray into both nostrils daily 11.1 mL 0     Lactobacillus (ACIDOPHILUS PO) 4 billion active cultures       loratadine (CLARITIN) 10 MG tablet Take 10 mg by mouth daily Patient takes at bedtime       magnesium oxide (MAG-OX) 400 MG tablet Total dose = 800 mg at lunch time and 400 mg at dinner time 90 tablet 3     metFORMIN (GLUCOPHAGE-XR) 500 MG 24 hr tablet Take 4 tablets (2,000 mg) by mouth daily (with breakfast) 360 tablet 1     Multiple Vitamin (DAILY-DONY) TABS TAKE 1 TABLET BY MOUTH DAILY 90 tablet 3     mycophenolate (GENERIC EQUIVALENT) 250 MG capsule Take 3  capsules (750 mg) by mouth 2 times daily 180 capsule 11     pantoprazole (PROTONIX) 20 MG EC tablet Take 40 mg by mouth every other day       sodium bicarbonate 650 MG tablet Take 2 tablets (1,300 mg) by mouth daily 120 tablet 2     sulfamethoxazole-trimethoprim (BACTRIM/SEPTRA) 400-80 MG tablet Take 1 tablet by mouth daily 30 tablet 11     tacrolimus (GENERIC EQUIVALENT) 0.5 MG capsule Take ONE cap every PM (0.5 mg every evening) 6030 capsule 11     tacrolimus (GENERIC EQUIVALENT) 1 MG capsule Take ONE cap every AM (1 mg every morning) 30 capsule 11     Vitamin D3 (CHOLECALCIFEROL) 25 mcg (1000 units) tablet Take 1 tablet (25 mcg) by mouth daily 30 tablet 1        Allergies:  Allergies   Allergen Reactions     Norco [Hydrocodone-Acetaminophen] Nausea and Vomiting     Cats      Throat tightness     Isosorbide Other (See Comments)     hypotension     Penicillins Hives     Seasonal Allergies      rhinitis     Shrimp      Throat closes        Review of Systems:  Constitutional: otherwise feeling well today, in usual state of health.  HEENT: patient denies nonhealing oral sores.    Physical exam:  Vitals: There were no vitals taken for this visit.  General: in no acute distress, well-developed, well-nourished  Eyes: conjunctivae clear  Pulmonary: breathing comfortably in no distress  CV: well-perfused, no cyanosis  Extremities: no deformity, no edema    Skin: Skin examined including face, neck, chest, back, abdomen, arms, legs, buttocks  - skin type: medium brown  - mid-back: circular depressed crateriform papule with central black keratinaceous debris  - back, chest: waxy stuck-on tan to brown macules and papules  - back: well-defined, symmetric brown pigmented macules and papules with a reassuring pattern on dermoscopy  - back: diffuse regular brown pigmented macules  - abdomen, legs: firm flesh-colored papule that dimples with lateral pressure    Impression/Plan:    1. Dilated pore of Elijah and underlying epidermal  inclusion cyst  - educated on the etiology and benign nature of the lesions and risks of benefits of various treatment options including monitoring, excision, and intralesional steroid injection  - keratinaceous debris was extracted with firm vertical pressure and removed with gauze; Vaseline applied  - counseled regarding high likelihood of recurrence and will monitor    2. Benign skin findings include seborrheic keratosis, benign nevi, lentigines, dermatofibroma  - lesions benign nature, no treatment is indicated at this time, will monitor for any clinical changes  - ABCD's of melanoma and signs of possible non-melanoma skin cancer were reviewed with patient    3. Immunosuppression due to transplant status  - counseled on increased risk of skin cancer  - sun protection reviewed    Follow-up in 1-2 years for skin exam    Faculty: Dr. Roche    Staff Involved:  Resident/Staff    Martina Simons MD  Dermatology Resident  [facility]  I, Carrie Roche MD, saw this patient with the resident and agree with the resident s findings and plan of care as documented in the resident s note.

## 2020-08-27 NOTE — NURSING NOTE
Dermatology Rooming Note    Murray Nicholson's goals for this visit include:   Chief Complaint   Patient presents with     Skin Check     Spot on back of concern. Murray notes that his back is itchy, and stings when he showers.       Heidy Gray, CMA

## 2020-08-31 RX ORDER — LANCETS
EACH MISCELLANEOUS
Qty: 300 EACH | Refills: 3 | Status: SHIPPED | OUTPATIENT
Start: 2020-08-31 | End: 2020-11-17

## 2020-08-31 RX ORDER — BLOOD SUGAR DIAGNOSTIC
STRIP MISCELLANEOUS
Qty: 300 STRIP | Refills: 3 | Status: SHIPPED | OUTPATIENT
Start: 2020-08-31 | End: 2020-11-17

## 2020-09-14 DIAGNOSIS — E11.29 TYPE 2 DIABETES MELLITUS WITH OTHER DIABETIC KIDNEY COMPLICATION, WITHOUT LONG-TERM CURRENT USE OF INSULIN (H): ICD-10-CM

## 2020-09-14 DIAGNOSIS — Z94.0 KIDNEY REPLACED BY TRANSPLANT: ICD-10-CM

## 2020-09-14 DIAGNOSIS — Z79.899 LONG TERM USE OF DRUG: ICD-10-CM

## 2020-09-14 LAB
ANION GAP SERPL CALCULATED.3IONS-SCNC: 9 MMOL/L (ref 3–14)
BUN SERPL-MCNC: 16 MG/DL (ref 7–30)
CALCIUM SERPL-MCNC: 9.4 MG/DL (ref 8.5–10.1)
CHLORIDE SERPL-SCNC: 105 MMOL/L (ref 94–109)
CO2 SERPL-SCNC: 24 MMOL/L (ref 20–32)
CREAT SERPL-MCNC: 1.03 MG/DL (ref 0.66–1.25)
CREAT UR-MCNC: 110 MG/DL
ERYTHROCYTE [DISTWIDTH] IN BLOOD BY AUTOMATED COUNT: 13.2 % (ref 10–15)
GFR SERPL CREATININE-BSD FRML MDRD: 76 ML/MIN/{1.73_M2}
GLUCOSE SERPL-MCNC: 171 MG/DL (ref 70–99)
HBA1C MFR BLD: 6.1 % (ref 0–5.6)
HCT VFR BLD AUTO: 39.2 % (ref 40–53)
HGB BLD-MCNC: 13.1 G/DL (ref 13.3–17.7)
MCH RBC QN AUTO: 30.8 PG (ref 26.5–33)
MCHC RBC AUTO-ENTMCNC: 33.4 G/DL (ref 31.5–36.5)
MCV RBC AUTO: 92 FL (ref 78–100)
PLATELET # BLD AUTO: 188 10E9/L (ref 150–450)
POTASSIUM SERPL-SCNC: 4 MMOL/L (ref 3.4–5.3)
PROT UR-MCNC: 0.14 G/L
PROT/CREAT 24H UR: 0.13 G/G CR (ref 0–0.2)
RBC # BLD AUTO: 4.26 10E12/L (ref 4.4–5.9)
SODIUM SERPL-SCNC: 138 MMOL/L (ref 133–144)
TACROLIMUS BLD-MCNC: 6.3 UG/L (ref 5–15)
TME LAST DOSE: NORMAL H
WBC # BLD AUTO: 4.6 10E9/L (ref 4–11)

## 2020-09-14 PROCEDURE — 80197 ASSAY OF TACROLIMUS: CPT | Performed by: INTERNAL MEDICINE

## 2020-09-14 PROCEDURE — 87799 DETECT AGENT NOS DNA QUANT: CPT | Performed by: INTERNAL MEDICINE

## 2020-09-24 ENCOUNTER — VIRTUAL VISIT (OUTPATIENT)
Dept: NEPHROLOGY | Facility: CLINIC | Age: 65
End: 2020-09-24
Attending: INTERNAL MEDICINE
Payer: MEDICARE

## 2020-09-24 VITALS — SYSTOLIC BLOOD PRESSURE: 125 MMHG | DIASTOLIC BLOOD PRESSURE: 89 MMHG

## 2020-09-24 DIAGNOSIS — Z94.0 HTN, KIDNEY TRANSPLANT RELATED: ICD-10-CM

## 2020-09-24 DIAGNOSIS — E83.42 HYPOMAGNESEMIA: ICD-10-CM

## 2020-09-24 DIAGNOSIS — Z94.1 HEART REPLACED BY TRANSPLANT (H): ICD-10-CM

## 2020-09-24 DIAGNOSIS — Z94.0 KIDNEY REPLACED BY TRANSPLANT: ICD-10-CM

## 2020-09-24 DIAGNOSIS — E55.9 VITAMIN D DEFICIENCY: ICD-10-CM

## 2020-09-24 DIAGNOSIS — Z29.89 NEED FOR PNEUMOCYSTIS PROPHYLAXIS: ICD-10-CM

## 2020-09-24 DIAGNOSIS — D47.2 MGUS (MONOCLONAL GAMMOPATHY OF UNKNOWN SIGNIFICANCE): ICD-10-CM

## 2020-09-24 DIAGNOSIS — N18.30 ANEMIA IN STAGE 3 CHRONIC KIDNEY DISEASE (H): ICD-10-CM

## 2020-09-24 DIAGNOSIS — N25.81 SECONDARY RENAL HYPERPARATHYROIDISM (H): ICD-10-CM

## 2020-09-24 DIAGNOSIS — Z48.298 AFTERCARE FOLLOWING ORGAN TRANSPLANT: ICD-10-CM

## 2020-09-24 DIAGNOSIS — D63.1 ANEMIA IN STAGE 3 CHRONIC KIDNEY DISEASE (H): ICD-10-CM

## 2020-09-24 DIAGNOSIS — D84.9 IMMUNOSUPPRESSION (H): ICD-10-CM

## 2020-09-24 DIAGNOSIS — G47.33 OSA ON CPAP: Primary | ICD-10-CM

## 2020-09-24 DIAGNOSIS — I15.1 HTN, KIDNEY TRANSPLANT RELATED: ICD-10-CM

## 2020-09-24 ASSESSMENT — PAIN SCALES - GENERAL: PAINLEVEL: NO PAIN (0)

## 2020-09-24 NOTE — PROGRESS NOTES
"Murray Nicholson is a 65 year old male who is being evaluated via a billable video visit.      The patient has been notified of following:     \"This video visit will be conducted via a call between you and your physician/provider. We have found that certain health care needs can be provided without the need for an in-person physical exam.  This service lets us provide the care you need with a video conversation.  If a prescription is necessary we can send it directly to your pharmacy.  If lab work is needed we can place an order for that and you can then stop by our lab to have the test done at a later time.    Video visits are billed at different rates depending on your insurance coverage.  Please reach out to your insurance provider with any questions.    If during the course of the call the physician/provider feels a video visit is not appropriate, you will not be charged for this service.\"    Patient has given verbal consent for Video visit? Yes  How would you like to obtain your AVS? Mail a copy  If you are dropped from the video visit, the video invite should be resent to: Send to e-mail at: [email or web address]  Will anyone else be joining your video visit? No    Video-Visit Details    Type of service:  Video Visit    Video Start Time: 2:05  Video End Time: 2:50 PM    Originating Location (pt. Location): Home    Distant Location (provider location):  [facility] NEPHROLOGY     Platform used for Video Visit: Morgan Kelly MD        "

## 2020-09-24 NOTE — LETTER
"9/24/2020       RE: Murray Nicholson  [address]  [place] [address]     Dear Colleague,    Thank you for referring your patient, Murray Nicholson, to the [facility] NEPHROLOGY at [facility]. Please see a copy of my visit note below.    Murray Nicholson is a 65 year old male who is being evaluated via a billable video visit.      The patient has been notified of following:     \"This video visit will be conducted via a call between you and your physician/provider. We have found that certain health care needs can be provided without the need for an in-person physical exam.  This service lets us provide the care you need with a video conversation.  If a prescription is necessary we can send it directly to your pharmacy.  If lab work is needed we can place an order for that and you can then stop by our lab to have the test done at a later time.    Video visits are billed at different rates depending on your insurance coverage.  Please reach out to your insurance provider with any questions.    If during the course of the call the physician/provider feels a video visit is not appropriate, you will not be charged for this service.\"    Patient has given verbal consent for Video visit? Yes  How would you like to obtain your AVS? Mail a copy  If you are dropped from the video visit, the video invite should be resent to: Send to e-mail at: [email or web address]  Will anyone else be joining your video visit? No    Video-Visit Details    Type of service:  Video Visit    Video Start Time: 2:05  Video End Time: 2:50 PM    Originating Location (pt. Location): Home    Distant Location (provider location):  [facility] NEPHROLOGY     Platform used for Video Visit: Morgan Kelly MD          ACUTE TRANSPLANT NEPHROLOGY VISIT    Assessment & Plan   # LDKT: Stable   - Baseline Cr ~ 1.0-1.2   - Proteinuria: Normal (<0.2 grams)   - Date DSA Last Checked: Apr/2020      Latest DSA: No   - BK " Viremia: No   - Kidney Tx Biopsy: No        # Heart Transplant: Appears to be stable.  Followed by Cardiology. Last visit in June 2020 w/ biopsy and angiogram.     # Immunosuppression: Tacrolimus immediate release (goal 6-8) and Mycophenolate mofetil (dose 750 mg every 12 hours)   - Changes: No, last tac level 6.3 (heart goal 6-8 as noted by Lillie Ulloa 8/2020)    # Infection Prophylaxis:   - PJP: Sulfa/TMP (Bactrim)  - CMV: None, prophylaxis completed    # Hypertension: Controlled;  Goal BP: < 130/80   - Changes: No    # Diabetes: Controlled (HbA1c <7%) Last HbA1c: 6.1%   - Management as per primary care. Will need to keep tighter control over blood glucose. On Metformin    # Anemia in Chronic Renal Disease: Hgb: Stable      ANASTASIYA: No   - Iron studies: Replete    # Mineral Bone Disorder:   - Secondary renal hyperparathyroidism; PTH level: Minimally elevated ( pg/ml)        On treatment: None  - Vitamin D; level: Low        On supplement: Yes  - Calcium; level: Normal        On supplement: No    # Electrolytes:   - Potassium; level: Normal        On supplement: No  - Magnesium; level: Low normal        On supplement: Yes  - Bicarbonate; level: Normal        On supplement: Yes    # PAD: Asymptomatic.    # MGUS: Peak monoclonal protein was ~ 0.5. ELP showing monoclonal IgG immunoglobulin of kappa light chain type, normal K/L ratio.    # GERD: Symptoms controlled on every other day PPI and daily H2 blocker.    # SHEELA: Patient doesn't appear to have any symptoms, but has not been able to tolerate CPAP.    # Need influenza: Will talk to his PCP about his vaccination    # Dermatology: Follows with them yearly. No suspicious lesions.    # Medical Compliance: Yes     # COVID-19 Virus Review: Discussed COVID-19 virus and the potential medical risks.  Reviewed preventative health recommendations, which includes washing hands for 20 seconds, avoid touching your face, and social distancing.  Asked patient to inform the  transplant center if they are exposed or diagnosed with this virus.    # Transplant History:  Etiology of Kidney Failure: Diabetes mellitus type 2  Tx: LDKT and Heart Tx  Transplant: 12/19/2019 (Kidney), 6/14/2018 (Heart)  Donor Type: Living Donor Class:   Crossmatch at time of Tx: negative  DSA at time of Tx: No  Significant changes in immunosuppression: None  CMV IgG Ab High Risk Discordance (D+/R-): No  EBV IgG Ab High Risk Discordance (D+/R-): No  Significant transplant-related complications: None    Transplant Office Phone Number: [phone]    Assessment and plan was discussed with the patient and he voiced his understanding and agreement.    Return visit: No follow-ups on file.    Jeremiah Kelly MD    Chief Complaint   Mr. Nicholson is a 65 year old here for kidney transplant and immunosuppression management.     History of Present Illness   He is s/p LDKT 12/19/19, performed by Dr Campbell, who presents for his 9 month follow up. He has a history of bicuspid aortic valve and moderate aortic insufficiency w/ ischemic cardiomyopathy s/p Raymond 6/2018, h/o DMT2, h/o ESRD on HD since 2017, h/o MGUS.     Mr. Nicholson reports feeling well and has no new complaints. He has a new appetite since getting the kidney and has gained ~10lbs. He has no nausea and no vomiting. He has no diarrhea and no constipation. He has no chest pain or shortness of breath.  He does daily vitals. He reports -130. He reports stable heartburn symptoms (has started withholding his pantoprazole)    Recent Hospitalizations:  [x] No [] Yes    New Medical Issues: [x] No [] Yes    Decreased energy: [x] No [] Yes    Chest pain or SOB with exertion:  [x] No [] Yes    Appetite change or weight change: [] No [x] Yes Weight is up ~ 10 lbs over last 9 months   Nausea, vomiting or diarrhea:  [x] No [] Yes    Fever, sweats or chills: [x] No [] Yes    Leg swelling: [x] No [] Yes      Home BP: 110-120/80-90s    Review of Systems   A comprehensive  review of systems was obtained and negative, except as noted in the HPI or PMH.    Problem List   Patient Active Problem List   Diagnosis     Esophageal reflux     Allergic rhinitis     Anemia in chronic renal disease     Coronary artery disease involving native artery of transplanted heart without angina pectoris     Dyslipidemia     MGUS (monoclonal gammopathy of unknown significance)     Ascending aortic aneurysm (H)     Sigmoid diverticulitis     Heart replaced by transplant (H)     Aortic stenosis     Status post coronary angiogram     Immunosuppression (H)     Type 2 diabetes mellitus (H)     Pancreatic cyst     Kidney replaced by transplant     Aftercare following organ transplant     HTN, kidney transplant related     Secondary renal hyperparathyroidism (H)     Vitamin D deficiency     Hypomagnesemia     SHEELA on CPAP     Need for pneumocystis prophylaxis       Social History   Social History     Tobacco Use     Smoking status: Former Smoker     Packs/day: 1.00     Years: 20.00     Pack years: 20.00     Types: Cigarettes     Start date: 1984     Last attempt to quit: 1994     Years since quittin.1     Smokeless tobacco: Never Used   Substance Use Topics     Alcohol use: No     Alcohol/week: 0.0 standard drinks     Drug use: No       Allergies   Allergies   Allergen Reactions     Norco [Hydrocodone-Acetaminophen] Nausea and Vomiting     Cats      Throat tightness     Isosorbide Other (See Comments)     hypotension     Penicillins Hives     Seasonal Allergies      rhinitis     Shrimp      Throat closes        Medications   Current Outpatient Medications   Medication Sig     acetaminophen (TYLENOL) 325 MG tablet Take 2 tablets (650 mg) by mouth every 4 hours as needed for other (multimodal surgical pain management along with NSAIDS and opioid medication as indicated based on pain control and physical function.)     aspirin 81 MG EC tablet Take 81 mg by mouth daily     atorvastatin (LIPITOR) 40 MG  tablet Take 1 tablet (40 mg) by mouth daily     biotin 1000 MCG TABS tablet Take 1,000 mcg by mouth daily Patient takes every other day     blood glucose (ACCU-CHEK GUIDE) test strip Use to test blood sugar 3 times daily or as directed.     blood glucose monitoring (ACCU-CHEK FASTCLIX) lancets USE TO TEST 3 TIMES DAILY OR AS DIRECTED.     calcium citrate and vitamin D (CITRACAL) 200-250 MG-UNIT TABS per tablet Take 1 tablet by mouth 2 times daily     famotidine (PEPCID) 20 MG tablet Take 1 tablet (20 mg) by mouth 2 times daily     fluticasone (FLONASE) 50 MCG/ACT nasal spray Spray 1 spray into both nostrils daily     Lactobacillus (ACIDOPHILUS PO) 4 billion active cultures     loratadine (CLARITIN) 10 MG tablet Take 10 mg by mouth daily Patient takes at bedtime     magnesium oxide (MAG-OX) 400 MG tablet Total dose = 800 mg at lunch time and 400 mg at dinner time     metFORMIN (GLUCOPHAGE-XR) 500 MG 24 hr tablet Take 4 tablets (2,000 mg) by mouth daily (with breakfast)     Multiple Vitamin (DAILY-DONY) TABS TAKE 1 TABLET BY MOUTH DAILY     mycophenolate (GENERIC EQUIVALENT) 250 MG capsule Take 3 capsules (750 mg) by mouth 2 times daily     pantoprazole (PROTONIX) 20 MG EC tablet Take 40 mg by mouth every other day     sodium bicarbonate 650 MG tablet Take 2 tablets (1,300 mg) by mouth daily     sulfamethoxazole-trimethoprim (BACTRIM/SEPTRA) 400-80 MG tablet Take 1 tablet by mouth daily     tacrolimus (GENERIC EQUIVALENT) 0.5 MG capsule Take ONE cap every PM (0.5 mg every evening)     tacrolimus (GENERIC EQUIVALENT) 1 MG capsule Take ONE cap every AM (1 mg every morning)     Vitamin D3 (CHOLECALCIFEROL) 25 mcg (1000 units) tablet Take 1 tablet (25 mcg) by mouth daily     No current facility-administered medications for this visit.      Facility-Administered Medications Ordered in Other Visits   Medication     diatrizoate meglumine-sodium (GASTROGRAFIN/GASTROVIEW) 66-10 % solution 480 mL     There are no discontinued  medications.    Physical Exam   Vital Signs: /89     GENERAL APPEARANCE: alert and no distress  HENT: no obvious abnormalities on appearance  RESP: breathing appears unremarkable with normal rate, no audible wheezing or cough and no apparent shortness of breath with conversation  MS: extremities normal - no gross deformities noted, no evidence of inflammation in joints, no muscle tenderness  SKIN: no apparent rash and normal skin tone  NEURO: speech is clear with no obvious neurological deficits  PSYCH: mentation appears normal and affect normal    Data     Renal Latest Ref Rng & Units 9/14/2020 8/19/2020 7/17/2020   Na 133 - 144 mmol/L 138 138 138   K 3.4 - 5.3 mmol/L 4.0 3.8 3.9   Cl 94 - 109 mmol/L 105 106 110(H)   CO2 20 - 32 mmol/L 24 25 22   BUN 7 - 30 mg/dL 16 20 17   Cr 0.66 - 1.25 mg/dL 1.03 1.13 1.15   Glucose 70 - 99 mg/dL 171(H) 195(H) 243(H)   Ca  8.5 - 10.1 mg/dL 9.4 9.1 9.1   Mg 1.6 - 2.3 mg/dL - - -     Bone Health Latest Ref Rng & Units 7/15/2020 6/29/2020 6/1/2020   Phos 2.5 - 4.5 mg/dL 3.2 3.5 3.5   PTHi 18 - 80 pg/mL - - -   Vit D Def 20 - 75 ug/L - - -     Heme Latest Ref Rng & Units 9/14/2020 8/19/2020 7/15/2020   WBC 4.0 - 11.0 10e9/L 4.6 3.9(L) 4.1   Hgb 13.3 - 17.7 g/dL 13.1(L) 12.6(L) 12.9(L)   Plt 150 - 450 10e9/L 188 160 181   ABSOLUTE NEUTROPHIL 1.6 - 8.3 10e9/L - - -   ABSOLUTE LYMPHOCYTES 0.8 - 5.3 10e9/L - - -   ABSOLUTE MONOCYTES 0.0 - 1.3 10e9/L - - -   ABSOLUTE EOSINOPHILS 0.0 - 0.7 10e9/L - - -   ABSOLUTE BASOPHILS 0.0 - 0.2 10e9/L - - -   ABS IMMATURE GRANULOCYTES 0 - 0.4 10e9/L - - -   ABSOLUTE NUCLEATED RBC - - - -     Liver Latest Ref Rng & Units 7/15/2020 6/29/2020 12/24/2019   AP 40 - 150 U/L 157(H) 159(H) 278(H)   TBili 0.2 - 1.3 mg/dL 1.0 1.0 1.1   DBili 0.0 - 0.2 mg/dL - 0.2 0.2   ALT 0 - 70 U/L 34 32 18   AST 0 - 45 U/L 20 24 15   Tot Protein 6.8 - 8.8 g/dL 7.5 7.5 7.1   Albumin 3.4 - 5.0 g/dL 3.9 3.8 3.8     Pancreas Latest Ref Rng & Units 9/14/2020 7/15/2020  6/29/2020   A1C 0 - 5.6 % 6.1(H) 6.3(H) 6.1(H)   Amylase 30 - 110 U/L - - -     Iron studies Latest Ref Rng & Units 12/10/2019 6/10/2019 7/10/2018   Iron 35 - 180 ug/dL 84 118 66   Iron sat 15 - 46 % 35 47(H) 28   Ferritin 26 - 388 ng/mL 445(H) 644(H) 771(H)     [facility] Txp Virology Latest Ref Rng & Units 9/14/2020 8/10/2020 7/15/2020   CVM DNA Quant - - - Plasma, EDTA anticoagulant   CMV QUANT IU/ML CMVND:CMV DNA Not Detected [IU]/mL - - CMV DNA Not Detected   LOG IU/ML OF CMVQNT <2.1 [Log:IU]/mL - - Not Calculated   BK Spec - Plasma Plasma -   BK Res BKNEG:BK Virus DNA Not Detected copies/mL BK Virus DNA Not Detected BK Virus DNA Not Detected -   BK Log <2.7 Log copies/mL Not Calculated Not Calculated -   EBV CAPSID ANTIBODY IGG 0.0 - 0.8 AI - - -   EBV DNA COPIES/ML EBVNEG:EBV DNA Not Detected [Copies]/mL - - EBV DNA Not Detected   EBV DNA LOG OF COPIES <2.7 [Log:copies]/mL - - Not Calculated   Hep B Core NR:Nonreactive - - -        Recent Labs   Lab Test 08/10/20  0939 08/19/20  0921 09/14/20  0934   DOSTAC 0920 8/18/20 2120 920p 9/14/2020   TACROL 10.8 6.0 6.3     Recent Labs   Lab Test 12/26/19  0720 01/27/20  0918 02/03/20  0948   DOSMPA Not Provided NTP 02/02/20 0915pm   MPACID 1.65 2.39 2.41   MPAG 58.9 54.6 50.4     Physician Attestation   I, Jamel Sanchez MD, saw this patient and agree with the findings and plan of care as documented in the note.      Items personally reviewed/procedural attestation: vitals, labs and imaging and agree with the interpretation documented in the note.    Jamel Sanchez MD    Again, thank you for allowing me to participate in the care of your patient.      Sincerely,    Kidney/Pancreas Recipient

## 2020-09-24 NOTE — PROGRESS NOTES
ACUTE TRANSPLANT NEPHROLOGY VISIT    Assessment & Plan   # LDKT: Stable   - Baseline Cr ~ 1.0-1.2   - Proteinuria: Normal (<0.2 grams)   - Date DSA Last Checked: Apr/2020      Latest DSA: No   - BK Viremia: No   - Kidney Tx Biopsy: No        # Heart Transplant: Appears to be stable.  Followed by Cardiology. Last visit in June 2020 w/ biopsy and angiogram.     # Immunosuppression: Tacrolimus immediate release (goal 6-8) and Mycophenolate mofetil (dose 750 mg every 12 hours)   - Changes: No, last tac level 6.3 (heart goal 6-8 as noted by Lillei Sensyared 8/2020)    # Infection Prophylaxis:   - PJP: Sulfa/TMP (Bactrim)  - CMV: None, prophylaxis completed    # Hypertension: Controlled;  Goal BP: < 130/80   - Changes: No    # Diabetes: Controlled (HbA1c <7%) Last HbA1c: 6.1%   - Management as per primary care. Will need to keep tighter control over blood glucose. On Metformin    # Anemia in Chronic Renal Disease: Hgb: Stable      ANASTASIYA: No   - Iron studies: Replete    # Mineral Bone Disorder:   - Secondary renal hyperparathyroidism; PTH level: Minimally elevated ( pg/ml)        On treatment: None  - Vitamin D; level: Low        On supplement: Yes  - Calcium; level: Normal        On supplement: No    # Electrolytes:   - Potassium; level: Normal        On supplement: No  - Magnesium; level: Low normal        On supplement: Yes  - Bicarbonate; level: Normal        On supplement: Yes    # PAD: Asymptomatic.    # MGUS: Peak monoclonal protein was ~ 0.5. ELP showing monoclonal IgG immunoglobulin of kappa light chain type, normal K/L ratio.    # GERD: Symptoms controlled on every other day PPI and daily H2 blocker.    # SHEELA: Patient doesn't appear to have any symptoms, but has not been able to tolerate CPAP.    # Need influenza: Will talk to his PCP about his vaccination    # Dermatology: Follows with them yearly. No suspicious lesions.    # Medical Compliance: Yes     # COVID-19 Virus Review: Discussed COVID-19 virus and the  potential medical risks.  Reviewed preventative health recommendations, which includes washing hands for 20 seconds, avoid touching your face, and social distancing.  Asked patient to inform the transplant center if they are exposed or diagnosed with this virus.    # Transplant History:  Etiology of Kidney Failure: Diabetes mellitus type 2  Tx: LDKT and Heart Tx  Transplant: 12/19/2019 (Kidney), 6/14/2018 (Heart)  Donor Type: Living Donor Class:   Crossmatch at time of Tx: negative  DSA at time of Tx: No  Significant changes in immunosuppression: None  CMV IgG Ab High Risk Discordance (D+/R-): No  EBV IgG Ab High Risk Discordance (D+/R-): No  Significant transplant-related complications: None    Transplant Office Phone Number: [phone]    Assessment and plan was discussed with the patient and he voiced his understanding and agreement.    Return visit: No follow-ups on file.    Jeremiah Kelly MD    Chief Complaint   Mr. Nicholson is a 65 year old here for kidney transplant and immunosuppression management.     History of Present Illness   He is s/p LDKT 12/19/19, performed by Dr Campbell, who presents for his 9 month follow up. He has a history of bicuspid aortic valve and moderate aortic insufficiency w/ ischemic cardiomyopathy s/p Raymond 6/2018, h/o DMT2, h/o ESRD on HD since 2017, h/o MGUS.     Mr. Nicholson reports feeling well and has no new complaints. He has a new appetite since getting the kidney and has gained ~10lbs. He has no nausea and no vomiting. He has no diarrhea and no constipation. He has no chest pain or shortness of breath.  He does daily vitals. He reports -130. He reports stable heartburn symptoms (has started withholding his pantoprazole)    Recent Hospitalizations:  [x] No [] Yes    New Medical Issues: [x] No [] Yes    Decreased energy: [x] No [] Yes    Chest pain or SOB with exertion:  [x] No [] Yes    Appetite change or weight change: [] No [x] Yes Weight is up ~ 10 lbs over last 9  months   Nausea, vomiting or diarrhea:  [x] No [] Yes    Fever, sweats or chills: [x] No [] Yes    Leg swelling: [x] No [] Yes      Home BP: 110-120/80-90s    Review of Systems   A comprehensive review of systems was obtained and negative, except as noted in the HPI or PMH.    Problem List   Patient Active Problem List   Diagnosis     Esophageal reflux     Allergic rhinitis     Anemia in chronic renal disease     Coronary artery disease involving native artery of transplanted heart without angina pectoris     Dyslipidemia     MGUS (monoclonal gammopathy of unknown significance)     Ascending aortic aneurysm (H)     Sigmoid diverticulitis     Heart replaced by transplant (H)     Aortic stenosis     Status post coronary angiogram     Immunosuppression (H)     Type 2 diabetes mellitus (H)     Pancreatic cyst     Kidney replaced by transplant     Aftercare following organ transplant     HTN, kidney transplant related     Secondary renal hyperparathyroidism (H)     Vitamin D deficiency     Hypomagnesemia     SHEELA on CPAP     Need for pneumocystis prophylaxis       Social History   Social History     Tobacco Use     Smoking status: Former Smoker     Packs/day: 1.00     Years: 20.00     Pack years: 20.00     Types: Cigarettes     Start date: 1984     Last attempt to quit: 1994     Years since quittin.1     Smokeless tobacco: Never Used   Substance Use Topics     Alcohol use: No     Alcohol/week: 0.0 standard drinks     Drug use: No       Allergies   Allergies   Allergen Reactions     Norco [Hydrocodone-Acetaminophen] Nausea and Vomiting     Cats      Throat tightness     Isosorbide Other (See Comments)     hypotension     Penicillins Hives     Seasonal Allergies      rhinitis     Shrimp      Throat closes        Medications   Current Outpatient Medications   Medication Sig     acetaminophen (TYLENOL) 325 MG tablet Take 2 tablets (650 mg) by mouth every 4 hours as needed for other (multimodal surgical pain  management along with NSAIDS and opioid medication as indicated based on pain control and physical function.)     aspirin 81 MG EC tablet Take 81 mg by mouth daily     atorvastatin (LIPITOR) 40 MG tablet Take 1 tablet (40 mg) by mouth daily     biotin 1000 MCG TABS tablet Take 1,000 mcg by mouth daily Patient takes every other day     blood glucose (ACCU-CHEK GUIDE) test strip Use to test blood sugar 3 times daily or as directed.     blood glucose monitoring (ACCU-CHEK FASTCLIX) lancets USE TO TEST 3 TIMES DAILY OR AS DIRECTED.     calcium citrate and vitamin D (CITRACAL) 200-250 MG-UNIT TABS per tablet Take 1 tablet by mouth 2 times daily     famotidine (PEPCID) 20 MG tablet Take 1 tablet (20 mg) by mouth 2 times daily     fluticasone (FLONASE) 50 MCG/ACT nasal spray Spray 1 spray into both nostrils daily     Lactobacillus (ACIDOPHILUS PO) 4 billion active cultures     loratadine (CLARITIN) 10 MG tablet Take 10 mg by mouth daily Patient takes at bedtime     magnesium oxide (MAG-OX) 400 MG tablet Total dose = 800 mg at lunch time and 400 mg at dinner time     metFORMIN (GLUCOPHAGE-XR) 500 MG 24 hr tablet Take 4 tablets (2,000 mg) by mouth daily (with breakfast)     Multiple Vitamin (DAILY-ODNY) TABS TAKE 1 TABLET BY MOUTH DAILY     mycophenolate (GENERIC EQUIVALENT) 250 MG capsule Take 3 capsules (750 mg) by mouth 2 times daily     pantoprazole (PROTONIX) 20 MG EC tablet Take 40 mg by mouth every other day     sodium bicarbonate 650 MG tablet Take 2 tablets (1,300 mg) by mouth daily     sulfamethoxazole-trimethoprim (BACTRIM/SEPTRA) 400-80 MG tablet Take 1 tablet by mouth daily     tacrolimus (GENERIC EQUIVALENT) 0.5 MG capsule Take ONE cap every PM (0.5 mg every evening)     tacrolimus (GENERIC EQUIVALENT) 1 MG capsule Take ONE cap every AM (1 mg every morning)     Vitamin D3 (CHOLECALCIFEROL) 25 mcg (1000 units) tablet Take 1 tablet (25 mcg) by mouth daily     No current facility-administered medications for this  visit.      Facility-Administered Medications Ordered in Other Visits   Medication     diatrizoate meglumine-sodium (GASTROGRAFIN/GASTROVIEW) 66-10 % solution 480 mL     There are no discontinued medications.    Physical Exam   Vital Signs: /89     GENERAL APPEARANCE: alert and no distress  HENT: no obvious abnormalities on appearance  RESP: breathing appears unremarkable with normal rate, no audible wheezing or cough and no apparent shortness of breath with conversation  MS: extremities normal - no gross deformities noted, no evidence of inflammation in joints, no muscle tenderness  SKIN: no apparent rash and normal skin tone  NEURO: speech is clear with no obvious neurological deficits  PSYCH: mentation appears normal and affect normal    Data     Renal Latest Ref Rng & Units 9/14/2020 8/19/2020 7/17/2020   Na 133 - 144 mmol/L 138 138 138   K 3.4 - 5.3 mmol/L 4.0 3.8 3.9   Cl 94 - 109 mmol/L 105 106 110(H)   CO2 20 - 32 mmol/L 24 25 22   BUN 7 - 30 mg/dL 16 20 17   Cr 0.66 - 1.25 mg/dL 1.03 1.13 1.15   Glucose 70 - 99 mg/dL 171(H) 195(H) 243(H)   Ca  8.5 - 10.1 mg/dL 9.4 9.1 9.1   Mg 1.6 - 2.3 mg/dL - - -     Bone Health Latest Ref Rng & Units 7/15/2020 6/29/2020 6/1/2020   Phos 2.5 - 4.5 mg/dL 3.2 3.5 3.5   PTHi 18 - 80 pg/mL - - -   Vit D Def 20 - 75 ug/L - - -     Heme Latest Ref Rng & Units 9/14/2020 8/19/2020 7/15/2020   WBC 4.0 - 11.0 10e9/L 4.6 3.9(L) 4.1   Hgb 13.3 - 17.7 g/dL 13.1(L) 12.6(L) 12.9(L)   Plt 150 - 450 10e9/L 188 160 181   ABSOLUTE NEUTROPHIL 1.6 - 8.3 10e9/L - - -   ABSOLUTE LYMPHOCYTES 0.8 - 5.3 10e9/L - - -   ABSOLUTE MONOCYTES 0.0 - 1.3 10e9/L - - -   ABSOLUTE EOSINOPHILS 0.0 - 0.7 10e9/L - - -   ABSOLUTE BASOPHILS 0.0 - 0.2 10e9/L - - -   ABS IMMATURE GRANULOCYTES 0 - 0.4 10e9/L - - -   ABSOLUTE NUCLEATED RBC - - - -     Liver Latest Ref Rng & Units 7/15/2020 6/29/2020 12/24/2019   AP 40 - 150 U/L 157(H) 159(H) 278(H)   TBili 0.2 - 1.3 mg/dL 1.0 1.0 1.1   DBili 0.0 - 0.2 mg/dL -  0.2 0.2   ALT 0 - 70 U/L 34 32 18   AST 0 - 45 U/L 20 24 15   Tot Protein 6.8 - 8.8 g/dL 7.5 7.5 7.1   Albumin 3.4 - 5.0 g/dL 3.9 3.8 3.8     Pancreas Latest Ref Rng & Units 9/14/2020 7/15/2020 6/29/2020   A1C 0 - 5.6 % 6.1(H) 6.3(H) 6.1(H)   Amylase 30 - 110 U/L - - -     Iron studies Latest Ref Rng & Units 12/10/2019 6/10/2019 7/10/2018   Iron 35 - 180 ug/dL 84 118 66   Iron sat 15 - 46 % 35 47(H) 28   Ferritin 26 - 388 ng/mL 445(H) 644(H) 771(H)     [facility] Txp Virology Latest Ref Rng & Units 9/14/2020 8/10/2020 7/15/2020   CVM DNA Quant - - - Plasma, EDTA anticoagulant   CMV QUANT IU/ML CMVND:CMV DNA Not Detected [IU]/mL - - CMV DNA Not Detected   LOG IU/ML OF CMVQNT <2.1 [Log:IU]/mL - - Not Calculated   BK Spec - Plasma Plasma -   BK Res BKNEG:BK Virus DNA Not Detected copies/mL BK Virus DNA Not Detected BK Virus DNA Not Detected -   BK Log <2.7 Log copies/mL Not Calculated Not Calculated -   EBV CAPSID ANTIBODY IGG 0.0 - 0.8 AI - - -   EBV DNA COPIES/ML EBVNEG:EBV DNA Not Detected [Copies]/mL - - EBV DNA Not Detected   EBV DNA LOG OF COPIES <2.7 [Log:copies]/mL - - Not Calculated   Hep B Core NR:Nonreactive - - -        Recent Labs   Lab Test 08/10/20  0939 08/19/20  0921 09/14/20  0934   DOSTAC 0920 8/18/20 2120 920p 9/14/2020   TACROL 10.8 6.0 6.3     Recent Labs   Lab Test 12/26/19  0720 01/27/20  0918 02/03/20  0948   DOSMPA Not Provided NTP 02/02/20 0915pm   MPACID 1.65 2.39 2.41   MPAG 58.9 54.6 50.4     Physician Attestation   I, Jamel Sanchez MD, saw this patient and agree with the findings and plan of care as documented in the note.      Items personally reviewed/procedural attestation: vitals, labs and imaging and agree with the interpretation documented in the note.    Jamel Sanchez MD

## 2020-09-30 ENCOUNTER — TELEPHONE (OUTPATIENT)
Dept: NEPHROLOGY | Facility: CLINIC | Age: 65
End: 2020-09-30

## 2020-09-30 NOTE — TELEPHONE ENCOUNTER
Post Kidney and Pancreas Transplant Team Conference  Date: 9/30/2020  Transplant Coordinator: Cami Villarreal     Attendees:  [x]  Dr. Quijano  [x] Patt Johns LPN     [x]  Dr. Ulloa [x] Edith Ward, TONY [] Donna Wheat LPN   [x]  Dr. Sanchez [] Marielena Romero, TONY    [x]  Dr. Melgar [] Radha Gray RN [x] Eduardo Lea, PharmD   [x] Dr. Ayala [x] Jennifer Villarreal, TONY    [] Dr. Cheney [] Fantasma Dorsey RN    [x] Dr. Chase [] Autumn Mendez, TONY    [x] Dr. Cowan [] Kari Carr RN    []  Dr. Dwyer [] Anastasia Tanner, TONY    [] Surgery Fellow [] Alma Rosa Monique RN    [x] Maliha Jesus NP [] Scarlet Haines RN        Verbal Plan Read Back:   No changes at this time    Routed to RN Coordinator   Patt Johns LPN

## 2020-10-05 ENCOUNTER — TELEPHONE (OUTPATIENT)
Dept: TRANSPLANT | Facility: CLINIC | Age: 65
End: 2020-10-05

## 2020-10-05 DIAGNOSIS — Z94.1 HEART REPLACED BY TRANSPLANT (H): Primary | ICD-10-CM

## 2020-10-05 NOTE — TELEPHONE ENCOUNTER
Called pt to arrange 2.5 yr follow up.  Will plan on Dec 10. Labs, Echo, clinic   My chart message to be sent once complete

## 2020-10-12 DIAGNOSIS — Z79.899 LONG TERM USE OF DRUG: ICD-10-CM

## 2020-10-12 DIAGNOSIS — Z94.1 HEART REPLACED BY TRANSPLANT (H): ICD-10-CM

## 2020-10-12 DIAGNOSIS — Z94.0 KIDNEY REPLACED BY TRANSPLANT: ICD-10-CM

## 2020-10-12 LAB
ANION GAP SERPL CALCULATED.3IONS-SCNC: 7 MMOL/L (ref 3–14)
BUN SERPL-MCNC: 14 MG/DL (ref 7–30)
CALCIUM SERPL-MCNC: 9.7 MG/DL (ref 8.5–10.1)
CHLORIDE SERPL-SCNC: 104 MMOL/L (ref 94–109)
CO2 SERPL-SCNC: 24 MMOL/L (ref 20–32)
CREAT SERPL-MCNC: 1 MG/DL (ref 0.66–1.25)
ERYTHROCYTE [DISTWIDTH] IN BLOOD BY AUTOMATED COUNT: 12.9 % (ref 10–15)
GFR SERPL CREATININE-BSD FRML MDRD: 78 ML/MIN/{1.73_M2}
GLUCOSE SERPL-MCNC: 160 MG/DL (ref 70–99)
HCT VFR BLD AUTO: 39.3 % (ref 40–53)
HGB BLD-MCNC: 12.9 G/DL (ref 13.3–17.7)
MCH RBC QN AUTO: 30.1 PG (ref 26.5–33)
MCHC RBC AUTO-ENTMCNC: 32.8 G/DL (ref 31.5–36.5)
MCV RBC AUTO: 92 FL (ref 78–100)
PLATELET # BLD AUTO: 205 10E9/L (ref 150–450)
POTASSIUM SERPL-SCNC: 4.2 MMOL/L (ref 3.4–5.3)
RBC # BLD AUTO: 4.29 10E12/L (ref 4.4–5.9)
SODIUM SERPL-SCNC: 135 MMOL/L (ref 133–144)
TACROLIMUS BLD-MCNC: 6 UG/L (ref 5–15)
TME LAST DOSE: NORMAL H
WBC # BLD AUTO: 4.6 10E9/L (ref 4–11)

## 2020-10-12 PROCEDURE — 36415 COLL VENOUS BLD VENIPUNCTURE: CPT | Performed by: PATHOLOGY

## 2020-10-12 PROCEDURE — 80197 ASSAY OF TACROLIMUS: CPT | Performed by: PATHOLOGY

## 2020-10-12 PROCEDURE — 87799 DETECT AGENT NOS DNA QUANT: CPT | Performed by: PATHOLOGY

## 2020-10-12 PROCEDURE — 85027 COMPLETE CBC AUTOMATED: CPT | Performed by: PATHOLOGY

## 2020-10-12 PROCEDURE — 80048 BASIC METABOLIC PNL TOTAL CA: CPT | Performed by: PATHOLOGY

## 2020-10-13 DIAGNOSIS — K21.9 GASTROESOPHAGEAL REFLUX DISEASE WITHOUT ESOPHAGITIS: ICD-10-CM

## 2020-10-13 RX ORDER — FAMOTIDINE 20 MG/1
TABLET, FILM COATED ORAL
Qty: 60 TABLET | Refills: 3 | Status: SHIPPED | OUTPATIENT
Start: 2020-10-13 | End: 2021-02-09

## 2020-10-14 RX ORDER — ATORVASTATIN CALCIUM 40 MG/1
40 TABLET, FILM COATED ORAL DAILY
Qty: 90 TABLET | Refills: 3 | Status: SHIPPED | OUTPATIENT
Start: 2020-10-14 | End: 2021-09-29

## 2020-11-11 DIAGNOSIS — K21.9 GASTROESOPHAGEAL REFLUX DISEASE, UNSPECIFIED WHETHER ESOPHAGITIS PRESENT: Primary | ICD-10-CM

## 2020-11-11 DIAGNOSIS — Z94.0 KIDNEY REPLACED BY TRANSPLANT: ICD-10-CM

## 2020-11-11 NOTE — PROGRESS NOTES
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 Last HD 8/11/18.         ASSESSMENT AND RECOMMENDATIONS:       1. ESRD - Etiology of ESRD 2/2 HTN, DM, CRS.   Has chronic OP HD at [facility], TTS schedule, under care of Dr Mcpherson.    - Dialysis orders: Right TDC, EDW 76 kg, Run time 4 hrs   - Will continue TTS schedule   - Please remove doyle      2. Volume status - Euvolemic. No edema/dyspnea. -140/. Has doyle and is anuric. No weight today.     - Will UF tomorrow as tolerated   - EDW 76 kg   - Continue pre run weights, standing   - Strict I/O      3. HTN - Acceptable control. -140/. OP antihypertensive regimen:  Clonidine 0.1 mg HS, Hydralazine 50 mg QID, Lisinopril 20 mg every day   - All currently on hold      4. Transplant IS regimen: Tac, MMF, Pred. TAC 8.4      5. Electrolytes - No acute concerns. K 4.4, Na 134      6. Acid base - No acute concerns. Bicarb 25      7. BMD - corrected Ca 9.6, Phos 5.2, albumin 1.6   - Vit D/PTH being managed in OP HD unit      8. Anemia - Hgb 11.0. On Epo 7600 q run. Last RBC 8/12      9. Pericolonic abscess, small perircal abscess 2.7 cm.- Underwent I/D anorectal abscess on 8/13 and  Sigmoidectomy/end colostomy on 8/14. Also has C diff. Current abx: Po Vanco, Flagyl, Maxipime and Ganciclovir.    - BC neg, WBC 4.3, tmax 99.2      10. Lung nodules - New Right lower lobe pulmonary nodules seen on CT this admission. ID recommending close f/u.       Recommendations were communicated to primary team via progress note      Patricia Cook, NP   [phone]      Interval History :  Pt called in to cancel appt for today--he stated he did not receive any messages when told he was called 3 times to change appt from KIM walker to Dr walker--appt was moved to 11/17--pt will call if needs to move appt again--had recent surgery and is in a lot of pain "     POD #1 for sigmoidectomy/end colostomy  Has doyle  Labs/Blood pressures acceptable.   Pain is controlled  Interval progress notes, imaging studies and labs reviewed      Review of Systems:   I reviewed the following systems:  GI: NPO  Neuro:  no confusion  Constitutional:  no fever or chills  CV: denies dyspnea, CP or edema.   : Minimal urine    Physical Exam:   I/O last 3 completed shifts:  In: 1388 [I.V.:823]  Out: 2710 [Urine:60; Other:2500; Blood:150]   BP (!) 150/100 (BP Location: Left arm)  Pulse 70  Temp 98.7  F (37.1  C) (Oral)  Resp 16  Ht 1.753 m (5' 9\")  Wt 78.8 kg (173 lb 11.6 oz)  SpO2 100%  BMI 25.65 kg/m2     GENERAL APPEARANCE: Pain controlled. Calm  EYES: no scleral icterus, pupils equal  Pulmonary: lungs CTA. Breathing is non labored. On supplemental oxygen  CV: RRR   - Edema - none  GI: soft, appropriately tender. New colostomy/stoma pink  MS: no evidence of inflammation in joints, no muscle tenderness  : has doyle  SKIN: no rash, warm, dry, no cyanosis  NEURO: alert/oriented, has bilateral hand tremors  ACCESS: Right TDC    Labs:   All labs reviewed by me  Electrolytes/Renal -   Recent Labs   Lab Test  08/15/18   0638  08/14/18   0620  08/13/18   0618   08/12/18   0834   NA  134  133  135   < >  138   POTASSIUM  4.4  4.5  3.8   < >  3.5   CHLORIDE  99  102  103   < >  101   CO2  25  20  24   < >  25   BUN  30  48*  38*   < >  24   CR  5.20*  7.08*  5.72*   < >  4.01*   GLC  79  106*  40*   < >  108*   TRINI  7.8*  7.5*  7.9*   < >  7.4*   MAG  1.9  2.5*  2.5*   < >  1.4*   PHOS  5.0*  5.7*   --    --   2.6    < > = values in this interval not displayed.       CBC -   Recent Labs   Lab Test  08/15/18   0638  08/14/18 0620 08/13/18 0618   WBC  4.3  3.7*  5.2   HGB  11.0*  9.0*  8.6*   PLT  134*  103*  117*       LFTs -   Recent Labs   Lab Test  08/14/18   0620  08/13/18   0618  08/08/18   0921  07/29/18   0523   ALKPHOS  110  99  156*  106   BILITOTAL  0.3  0.3  0.4  0.4   ALT  " 17  17  22  21   AST  29  28  34  26   PROTTOTAL  5.3*  5.2*   --   5.2*   ALBUMIN  1.6*  1.6*   --   1.9*       Iron Panel -   Recent Labs   Lab Test  07/10/18   0711  05/08/18   0954  07/19/17   1306   IRON  66  58  46   IRONSAT  28  27  18   ALBERTINA  771*  621*  369         Current Medications:    acetaminophen  1,000 mg Oral 4x Daily     biotin  5 mg Oral Daily     ceFEPIme (MAXIPIME) IV  1 g Intravenous Q24H     fluticasone  1-2 spray Both Nostrils Daily     ganciclovir (CYTOVENE) intermittent infusion  1.25 mg/kg Intravenous Once per day on Mon Wed Fri     insulin aspart  1-6 Units Subcutaneous Q4H     lipids  250 mL Intravenous Q24H     metroNIDAZOLE  500 mg Intravenous Q8H     mycophenolate  500 mg Oral BID IS     nystatin  1,000,000 Units Swish & Swallow 4x Daily     pantoprazole  40 mg Intravenous Q24H     predniSONE  10 mg Oral BID     rosuvastatin  20 mg Oral Daily     saccharomyces boulardii  250 mg Oral BID     sodium chloride (PF)  3 mL Intracatheter Q8H     sodium chloride (PF)  3 mL Intracatheter Q8H     tacrolimus  1 mg Oral QAM     tacrolimus  2 mg Oral QPM     triamcinolone   Topical BID     vancomycin  125 mg Oral 4x Daily       IV fluid REPLACEMENT ONLY       dextrose       HYDROmorphone       lactated ringers 40 mL/hr at 08/14/18 2212     - MEDICATION INSTRUCTIONS -       Patient RECEIVING antibiotic to treat a different condition and it provides ADEQUATE COVERAGE for this surgical procedure.       Patricia Cook, LEWIS

## 2020-11-13 RX ORDER — LANCETS
EACH MISCELLANEOUS
Qty: 102 EACH | OUTPATIENT
Start: 2020-11-13

## 2020-11-13 RX ORDER — PANTOPRAZOLE SODIUM 20 MG/1
20 TABLET, DELAYED RELEASE ORAL EVERY OTHER DAY
Qty: 90 TABLET | Refills: 1 | Status: SHIPPED | OUTPATIENT
Start: 2020-11-13 | End: 2021-08-19

## 2020-11-14 ENCOUNTER — HEALTH MAINTENANCE LETTER (OUTPATIENT)
Age: 65
End: 2020-11-14

## 2020-11-16 DIAGNOSIS — Z94.0 KIDNEY REPLACED BY TRANSPLANT: ICD-10-CM

## 2020-11-16 DIAGNOSIS — Z79.899 LONG TERM USE OF DRUG: ICD-10-CM

## 2020-11-16 LAB
ANION GAP SERPL CALCULATED.3IONS-SCNC: 7 MMOL/L (ref 3–14)
BUN SERPL-MCNC: 16 MG/DL (ref 7–30)
CALCIUM SERPL-MCNC: 9.8 MG/DL (ref 8.5–10.1)
CHLORIDE SERPL-SCNC: 102 MMOL/L (ref 94–109)
CO2 SERPL-SCNC: 27 MMOL/L (ref 20–32)
CREAT SERPL-MCNC: 1.21 MG/DL (ref 0.66–1.25)
GFR SERPL CREATININE-BSD FRML MDRD: 62 ML/MIN/{1.73_M2}
GLUCOSE SERPL-MCNC: 154 MG/DL (ref 70–99)
KAPPA LC UR-MCNC: 1.75 MG/DL (ref 0.33–1.94)
KAPPA LC/LAMBDA SER: 1.02 {RATIO} (ref 0.26–1.65)
LAMBDA LC SERPL-MCNC: 1.71 MG/DL (ref 0.57–2.63)
POTASSIUM SERPL-SCNC: 3.7 MMOL/L (ref 3.4–5.3)
SODIUM SERPL-SCNC: 136 MMOL/L (ref 133–144)
TACROLIMUS BLD-MCNC: 6.4 UG/L (ref 5–15)
TME LAST DOSE: NORMAL H

## 2020-11-16 PROCEDURE — 84165 PROTEIN E-PHORESIS SERUM: CPT | Mod: 90 | Performed by: PATHOLOGY

## 2020-11-16 PROCEDURE — 83883 ASSAY NEPHELOMETRY NOT SPEC: CPT | Performed by: PATHOLOGY

## 2020-11-16 PROCEDURE — 80048 BASIC METABOLIC PNL TOTAL CA: CPT | Performed by: PATHOLOGY

## 2020-11-16 PROCEDURE — 36415 COLL VENOUS BLD VENIPUNCTURE: CPT | Performed by: PATHOLOGY

## 2020-11-16 PROCEDURE — 999N001036 HC STATISTIC TOTAL PROTEIN: Performed by: PATHOLOGY

## 2020-11-16 PROCEDURE — 99000 SPECIMEN HANDLING OFFICE-LAB: CPT | Performed by: PATHOLOGY

## 2020-11-16 PROCEDURE — 80197 ASSAY OF TACROLIMUS: CPT | Performed by: PATHOLOGY

## 2020-11-16 PROCEDURE — 87799 DETECT AGENT NOS DNA QUANT: CPT | Performed by: PATHOLOGY

## 2020-11-17 DIAGNOSIS — Z94.0 KIDNEY REPLACED BY TRANSPLANT: ICD-10-CM

## 2020-11-17 LAB
ALBUMIN SERPL ELPH-MCNC: 4.4 G/DL (ref 3.7–5.1)
ALPHA1 GLOB SERPL ELPH-MCNC: 0.3 G/DL (ref 0.2–0.4)
ALPHA2 GLOB SERPL ELPH-MCNC: 0.8 G/DL (ref 0.5–0.9)
B-GLOBULIN SERPL ELPH-MCNC: 0.7 G/DL (ref 0.6–1)
GAMMA GLOB SERPL ELPH-MCNC: 1.3 G/DL (ref 0.7–1.6)
M PROTEIN SERPL ELPH-MCNC: 0.4 G/DL
PROT PATTERN SERPL ELPH-IMP: ABNORMAL

## 2020-11-17 RX ORDER — LANCETS
EACH MISCELLANEOUS
Qty: 300 EACH | Refills: 3 | Status: SHIPPED | OUTPATIENT
Start: 2020-11-17 | End: 2023-05-31

## 2020-11-17 RX ORDER — BLOOD SUGAR DIAGNOSTIC
STRIP MISCELLANEOUS
Qty: 300 STRIP | Refills: 3 | Status: SHIPPED | OUTPATIENT
Start: 2020-11-17 | End: 2023-05-31

## 2020-11-18 ENCOUNTER — TELEPHONE (OUTPATIENT)
Dept: TRANSPLANT | Facility: CLINIC | Age: 65
End: 2020-11-18

## 2020-11-18 NOTE — TELEPHONE ENCOUNTER
Post Kidney and Pancreas Transplant Team Conference  Date: 11/18/2020  Transplant Coordinator: Lillie Ulloa, Cami Villarreal     Attendees:  [x]  Dr. Quijano  [x] Patt Johns LPN     [x]  Dr. Ulloa [x] Edith Ward RN [] Donna Wheat LPN   [x]  Dr. Sanchez [] Marielena Romero, TONY    [x]  Dr. Melgar [] Radha Gray RN [x] Eduardo Lea, PharmD   [x] Dr. Ayala [x] Jennifer Villarreal, TONY    [x] Dr. Cheney [] Fantasma Dorsey RN    [x] Dr. Chase [] Autumn Mendez RN    [] Dr. Cowan [] Kari Carr RN    []  Dr. Dwyer [] Anastasia Tanner RN    [] Surgery Fellow [] Alma Rosa Monique RN    [x] Maliha Jesus, LEWIS [] Scarlet Haines RN      Verbal Plan Read Back:   No changes at this time    Routed to RN Coordinator   Patt Johns LPN    RNCC called to Murray for general health check in: He denies any questions or concerns other than whether or not he should be with his family for the upcoming holiday.  One son works in a bar.  The other son works as a teacher with students, in person and the girlfriend who has children with joint custody.  The only time Murray is around people is when he is at the grocery store.    RNCC advised to convene with his knowledge of [facility] recommendations and his baseline comfort level.    Murray voiced understanding.     He also voiced that he received his influenza vaccine early September.   Murray also received his 1st dose shingrix 1 month ago, 2nd dose is scheduled.    RNCC reviewed K/L with Murray, all at his baseline, no concern at this time.

## 2020-12-04 ENCOUNTER — PRE VISIT (OUTPATIENT)
Dept: TRANSPLANT | Facility: CLINIC | Age: 65
End: 2020-12-04

## 2020-12-04 DIAGNOSIS — Z13.6 ENCOUNTER FOR LIPID SCREENING FOR CARDIOVASCULAR DISEASE: ICD-10-CM

## 2020-12-04 DIAGNOSIS — Z13.220 ENCOUNTER FOR LIPID SCREENING FOR CARDIOVASCULAR DISEASE: ICD-10-CM

## 2020-12-04 DIAGNOSIS — Z79.899 ENCOUNTER FOR LONG-TERM (CURRENT) USE OF MEDICATIONS: ICD-10-CM

## 2020-12-04 DIAGNOSIS — Z94.1 HEART REPLACED BY TRANSPLANT (H): Primary | ICD-10-CM

## 2020-12-09 ENCOUNTER — ANCILLARY PROCEDURE (OUTPATIENT)
Dept: CARDIOLOGY | Facility: CLINIC | Age: 65
End: 2020-12-09
Attending: INTERNAL MEDICINE
Payer: MEDICARE

## 2020-12-09 ENCOUNTER — RESULTS ONLY (OUTPATIENT)
Dept: OTHER | Facility: CLINIC | Age: 65
End: 2020-12-09

## 2020-12-09 VITALS
SYSTOLIC BLOOD PRESSURE: 150 MMHG | HEART RATE: 85 BPM | OXYGEN SATURATION: 99 % | WEIGHT: 181 LBS | HEIGHT: 69 IN | BODY MASS INDEX: 26.81 KG/M2 | DIASTOLIC BLOOD PRESSURE: 96 MMHG

## 2020-12-09 DIAGNOSIS — Z94.1 HEART REPLACED BY TRANSPLANT (H): Primary | ICD-10-CM

## 2020-12-09 DIAGNOSIS — Z94.1 HEART REPLACED BY TRANSPLANT (H): ICD-10-CM

## 2020-12-09 DIAGNOSIS — Z13.6 ENCOUNTER FOR LIPID SCREENING FOR CARDIOVASCULAR DISEASE: ICD-10-CM

## 2020-12-09 DIAGNOSIS — Z79.899 ENCOUNTER FOR LONG-TERM (CURRENT) USE OF MEDICATIONS: ICD-10-CM

## 2020-12-09 DIAGNOSIS — Z13.220 ENCOUNTER FOR LIPID SCREENING FOR CARDIOVASCULAR DISEASE: ICD-10-CM

## 2020-12-09 LAB
ALBUMIN SERPL-MCNC: 3.8 G/DL (ref 3.4–5)
ALP SERPL-CCNC: 136 U/L (ref 40–150)
ALT SERPL W P-5'-P-CCNC: 36 U/L (ref 0–70)
ANION GAP SERPL CALCULATED.3IONS-SCNC: 5 MMOL/L (ref 3–14)
AST SERPL W P-5'-P-CCNC: 22 U/L (ref 0–45)
BILIRUB SERPL-MCNC: 1.1 MG/DL (ref 0.2–1.3)
BUN SERPL-MCNC: 15 MG/DL (ref 7–30)
CALCIUM SERPL-MCNC: 9.3 MG/DL (ref 8.5–10.1)
CHLORIDE SERPL-SCNC: 103 MMOL/L (ref 94–109)
CHOLEST SERPL-MCNC: 116 MG/DL
CK SERPL-CCNC: 248 U/L (ref 30–300)
CO2 SERPL-SCNC: 26 MMOL/L (ref 20–32)
CREAT SERPL-MCNC: 1.11 MG/DL (ref 0.66–1.25)
ERYTHROCYTE [DISTWIDTH] IN BLOOD BY AUTOMATED COUNT: 13.1 % (ref 10–15)
GFR SERPL CREATININE-BSD FRML MDRD: 69 ML/MIN/{1.73_M2}
GLUCOSE SERPL-MCNC: 118 MG/DL (ref 70–99)
HCT VFR BLD AUTO: 39.3 % (ref 40–53)
HDLC SERPL-MCNC: 47 MG/DL
HGB BLD-MCNC: 12.5 G/DL (ref 13.3–17.7)
LDLC SERPL CALC-MCNC: 26 MG/DL
MAGNESIUM SERPL-MCNC: 1.8 MG/DL (ref 1.6–2.3)
MCH RBC QN AUTO: 29.6 PG (ref 26.5–33)
MCHC RBC AUTO-ENTMCNC: 31.8 G/DL (ref 31.5–36.5)
MCV RBC AUTO: 93 FL (ref 78–100)
NONHDLC SERPL-MCNC: 69 MG/DL
PHOSPHATE SERPL-MCNC: 2.9 MG/DL (ref 2.5–4.5)
PLATELET # BLD AUTO: 174 10E9/L (ref 150–450)
POTASSIUM SERPL-SCNC: 3.9 MMOL/L (ref 3.4–5.3)
PROT SERPL-MCNC: 7.3 G/DL (ref 6.8–8.8)
RBC # BLD AUTO: 4.23 10E12/L (ref 4.4–5.9)
SODIUM SERPL-SCNC: 135 MMOL/L (ref 133–144)
TACROLIMUS BLD-MCNC: 7 UG/L (ref 5–15)
TME LAST DOSE: NORMAL H
TRIGL SERPL-MCNC: 214 MG/DL
WBC # BLD AUTO: 4 10E9/L (ref 4–11)

## 2020-12-09 PROCEDURE — 87799 DETECT AGENT NOS DNA QUANT: CPT | Performed by: PATHOLOGY

## 2020-12-09 PROCEDURE — 80053 COMPREHEN METABOLIC PANEL: CPT | Performed by: PATHOLOGY

## 2020-12-09 PROCEDURE — 86833 HLA CLASS II HIGH DEFIN QUAL: CPT | Performed by: PATHOLOGY

## 2020-12-09 PROCEDURE — 80061 LIPID PANEL: CPT | Performed by: PATHOLOGY

## 2020-12-09 PROCEDURE — 80197 ASSAY OF TACROLIMUS: CPT | Performed by: PATHOLOGY

## 2020-12-09 PROCEDURE — 93306 TTE W/DOPPLER COMPLETE: CPT | Performed by: INTERNAL MEDICINE

## 2020-12-09 PROCEDURE — G0463 HOSPITAL OUTPT CLINIC VISIT: HCPCS

## 2020-12-09 PROCEDURE — 85027 COMPLETE CBC AUTOMATED: CPT | Performed by: PATHOLOGY

## 2020-12-09 PROCEDURE — 84100 ASSAY OF PHOSPHORUS: CPT | Performed by: PATHOLOGY

## 2020-12-09 PROCEDURE — 83735 ASSAY OF MAGNESIUM: CPT | Performed by: PATHOLOGY

## 2020-12-09 PROCEDURE — 82550 ASSAY OF CK (CPK): CPT | Performed by: PATHOLOGY

## 2020-12-09 PROCEDURE — 86832 HLA CLASS I HIGH DEFIN QUAL: CPT | Performed by: PATHOLOGY

## 2020-12-09 PROCEDURE — 99214 OFFICE O/P EST MOD 30 MIN: CPT | Performed by: NURSE PRACTITIONER

## 2020-12-09 PROCEDURE — 36415 COLL VENOUS BLD VENIPUNCTURE: CPT | Performed by: PATHOLOGY

## 2020-12-09 PROCEDURE — 86352 CELL FUNCTION ASSAY W/STIM: CPT | Mod: 90 | Performed by: PATHOLOGY

## 2020-12-09 ASSESSMENT — PAIN SCALES - GENERAL: PAINLEVEL: NO PAIN (0)

## 2020-12-09 ASSESSMENT — MIFFLIN-ST. JEOR: SCORE: 1588.45

## 2020-12-09 NOTE — PATIENT INSTRUCTIONS
Please call your coordinator at [phone] with any questions or concerns.      Coordinator will update you on remaining results     Dental: early 2021    Keep weight less than 180#     See you back for your 3rd annual this summer!

## 2020-12-09 NOTE — NURSING NOTE
Transplant Coordinator Note  Reason for visit:  2.5 year follow up      Health concerns addressed today:  Pt seen in clinic NP Kendrick. NP reviewed meds, ECHO and labs. Pt reports doing very well. Was active this fall, wght up ~5#. -130/80s. Questions addressed re COVID. Currently taking zinc, Vit D and Vit C.       Immunosuppressants   mg BID   FK 1/0.5 mg; goal ~6; level 7. No dose change     No heart/kidney DSAs  Immuknow 258, 135   Allomaps 34, 27, 38, 37, 32     Routine screenings:    Derm: Aug 2020  Dental: Due  Colonoscopy: 2018 (diverticuli only)  Prostate: PSA 1.64  Eye: Done  Flu/Pneumonia: 2020 flu done; #1 Shingrix done      Medication record reviewed and reconciled. Questions and concerns addressed. AVS reviewed and copy provided. Pt verbalized an understanding of plan of care.      Patient Instructions  ~Please call your coordinator at [phone] with any questions or concerns.    ~Coordinator will update you on remaining results   ~Dental: early 2021  ~Keep weight less than 180#   ~See you back for your 3rd annual this summer!

## 2020-12-09 NOTE — PROGRESS NOTES
ADULT [facility] TRANSPL[facility]IC  December 9, 2020    HPI:   Mr. Murray Nicholson is a 65yr old male with a history of NICM s/p OHT 6/14/18, HTN, HL, DMII, and ESRD (on hemodialysis since fall 2017, now s/p renal transplant 12/2019) who presents to clinic for routine follow up.     His post-transplant course has been extremely complicated, and includes:  - leukocytosis  - RIJ thrombus infected with CoNS  - incidental pneumoperitoneum  - neutropenia  - oral mucosal ulcer secondary to neutropenia with Klebsiella infection  - pseudomonas bacteremia, resolved  - perforation of the small bowel, s/p small bowel resection and ileostomy, now s/p ileostomy takedown 3/27/19  - end-stage renal disease requiring hemodialysis since 2017, now s/p renal transplant 12/2019     His biopsies have remained negative for rejection.  AlloMap scores have been 37.  He has no DSAs.  His angiograms have shown showed donor coronary disease in all three coronary arteries, including a 40% mLAD lesion and 80% OM lesion; last coronary angiogram 7/2020 remained stable.  RHC 7/2020 showed normal biventricular filling pressures, with RA 6, mPA 18, PCW 9, and CI 2.8.  cMRI 7/2020 showed normal biventricular function (LVEF 63% and RVEF 63%), and was negative for ischemia/fibrosis.      Since his last visit, he has been doing well.  He has been exercising regularly, with no exertional concerns.  His breathing status has been good, and he denies SOB, PND, and orthopnea.  His appetite has been good, and he denies nausea, vomiting, diarrhea, and constipation.  He is hydrating well.  His weight is up slightly, ranging ~181#, and his goal is to get to 175#.  He denies fluid retention.  His BPs have been 110-130/80-90s.  He otherwise denies chest pain, palpitations, dizziness, falls, headaches, acute vision changes, fevers, chills, cough, sore throat, and signs of bleeding.      Serostatus:  CMV D+/R-  EBV D+/R+  Toxo D?/R-      Patient Active Problem List     Diagnosis Date Noted     Need for pneumocystis prophylaxis 06/28/2020     Priority: Medium     Aftercare following organ transplant 12/24/2019     Priority: Medium     HTN, kidney transplant related 12/24/2019     Priority: Medium     Secondary renal hyperparathyroidism (H) 12/24/2019     Priority: Medium     Vitamin D deficiency 12/24/2019     Priority: Medium     Hypomagnesemia 12/24/2019     Priority: Medium     SHEELA on CPAP 12/24/2019     Priority: Medium     Kidney replaced by transplant 12/19/2019     Priority: Medium     Pancreatic cyst 11/13/2019     Priority: Medium     Immunosuppression (H)      Priority: Medium     Type 2 diabetes mellitus (H)      Priority: Medium     Status post coronary angiogram 06/28/2019     Priority: Medium     Heart replaced by transplant (H) 12/10/2018     Priority: Medium     Added automatically from request for surgery [id number]       Sigmoid diverticulitis 08/14/2018     Priority: Medium     Dyslipidemia      Priority: Medium     MGUS (monoclonal gammopathy of unknown significance)      Priority: Medium     Ascending aortic aneurysm (H)      Priority: Medium     Coronary artery disease involving native artery of transplanted heart without angina pectoris 07/10/2014     Priority: Medium     Anemia in chronic renal disease 04/12/2013     Priority: Medium     Problem list name updated by automated process. Provider to review and confirm       Aortic stenosis 08/13/2008     Priority: Medium     Overview:   Bicuspid aortic valve       Allergic rhinitis 04/05/2006     Priority: Medium     Problem list name updated by automated process. Provider to review       Esophageal reflux 08/12/2004     Priority: Medium       PAST MEDICAL HISTORY:  Past Medical History:   Diagnosis Date     (HFpEF) heart failure with preserved ejection fraction (H)      Allergic rhinitis, cause unspecified      Anemia of chronic kidney failure      AS (aortic stenosis)      Ascending aortic aneurysm (H)       Bicuspid aortic valve      CAD (coronary artery disease)      Congestive heart failure, unspecified      Dialysis patient (H)     Tues-Thur-Sat     Dyslipidemia      Esophageal reflux      ESRD (end stage renal disease) (H)      Hearing problem      Heart replaced by transplant (H) 12/10/2018     Hypersomnia with sleep apnea, unspecified      Hypertension      Ileostomy status (H)      Immunosuppression (H)      MGUS (monoclonal gammopathy of unknown significance)      Mitral regurgitation      SHEELA (obstructive sleep apnea)     No CPAP     Pneumonia      Systolic heart failure (H)      Type 2 diabetes mellitus (H)        CURRENT MEDICATIONS:  Prescription Medications as of 12/9/2020       Rx Number Disp Refills Start End Last Dispensed Date Next Fill Date Owning Pharmacy    acetaminophen (TYLENOL) 325 MG tablet  50 tablet 1 12/22/2019    [facility] - [place], [place] - [address] [place] [address]    Sig: Take 2 tablets (650 mg) by mouth every 4 hours as needed for other (multimodal surgical pain management along with NSAIDS and opioid medication as indicated based on pain control and physical function.)    Class: Local Print    Route: Oral    aspirin 81 MG EC tablet            Sig: Take 81 mg by mouth daily    Class: Historical    Route: Oral    atorvastatin (LIPITOR) 40 MG tablet  90 tablet 3 10/14/2020    [facility] #[id number] - [place], [place] - [address] AT [facility]    Sig: Take 1 tablet (40 mg) by mouth daily    Class: E-Prescribe    Route: Oral    biotin 1000 MCG TABS tablet        [facility] #[id number] - [place], [place] - [address] AT [facility]    Sig: Take 1,000 mcg by mouth daily Patient takes every other day    Class: Historical    Route: Oral    blood glucose (ACCU-CHEK GUIDE) test strip  300 strip 3 11/17/2020    [facility] #[id number] - [place], [place] - [address] AT [facility]    Sig: Use to test blood sugar 3  times daily or as directed.    Class: E-Prescribe    Cosign for Ordering: Required by Mariangel Merida MD    blood glucose monitoring (ACCU-CHEK FASTCLIX) lancets  300 each 3 2020    [facility] #[id number] - [place], [place] - [address] AT [facility]    Sig: USE TO TEST 3 TIMES DAILY OR AS DIRECTED.    Class: E-Prescribe    Cosign for Ordering: Required by Mariangel Merida MD    calcium citrate and vitamin D (CITRACAL) 200-250 MG-UNIT TABS per tablet        [facility] #[id number] - [place], [place] - [address] AT [facility]    Sig: Take 1 tablet by mouth 2 times daily    Class: Historical    Route: Oral    famotidine (PEPCID) 20 MG tablet  60 tablet 3 10/13/2020    [facility] #[id number] - [place], [place] - [address] AT [facility]    Sig: TAKE 1 TABLET BY MOUTH TWICE DAILY    Class: E-Prescribe    fluticasone (FLONASE) 50 MCG/ACT nasal spray  11.1 mL 0 2020    [facility] #[id number] - [place], [place] - [address] [facility]    Sig: [place] 1 spray into both nostrils daily    Class: E-Prescribe    Route: Both Nostrils    Lactobacillus (ACIDOPHILUS PO)        [facility] #[id number] - [place], [place] - [address] [facility]    Si billion active cultures    Class: Historical    Route: Oral    loratadine (CLARITIN) 10 MG tablet        [facility] #[id number] - [place], [place] - [address] [facility]    Sig: Take 10 mg by mouth daily Patient takes at bedtime    Class: Historical    Route: Oral    magnesium oxide (MAG-OX) 400 MG tablet  90 tablet 3 2020    [facility] #[id number] - [place], [place] - [address] [facility]    Sig: Total dose = 800 mg at lunch time and 400 mg at dinner time    Class: E-Prescribe    metFORMIN (GLUCOPHAGE-XR) 500 MG 24 hr tablet  360 tablet 1 2020    [facility] #[id number] - [place], [place]  - [address] AT [facility]    Sig: Take 4 tablets (2,000 mg) by mouth daily (with breakfast)    Class: E-Prescribe    Route: Oral    Multiple Vitamin (DAILY-DONY) TABS  90 tablet 3 2/24/2020    [facility] #[id number] - [place], [place] - [address] [facility]    Sig: TAKE 1 TABLET BY MOUTH DAILY    Class: E-Prescribe    mycophenolate (GENERIC EQUIVALENT) 250 MG capsule  180 capsule 11 5/29/2020    [facility] [address] 5[address]    Sig: Take 3 capsules (750 mg) by mouth 2 times daily    Class: E-Prescribe    Route: Oral    pantoprazole (PROTONIX) 20 MG EC tablet  90 tablet 1 11/13/2020    [facility][id number] [facility] [place] [place] [facility] [address] [facility] [facility]    Sig: Take 1 tablet (20 mg) by mouth every other day    Class: E-Prescribe    Route: Oral    sodium bicarbonate 650 MG tablet  120 tablet 2 7/6/2020    [id number] - [place] [place] - [address] [facility]    Sig: Take 2 tablets (1,300 mg) by mouth daily    Class: E-Prescribe    Route: Oral    sulfamethoxazole-trimethoprim (BACTRIM/SEPTRA) 400-80 MG tablet  30 tablet 11 1/20/2020    [id number] [place] [place] [facility] [address] [facility]    Sig: Take 1 tablet by mouth daily    Class: E-Prescribe    Route: Oral    tacrolimus (GENERIC EQUIVALENT) 0.5 MG capsule  6030 capsule 11 8/11/2020    [facility][id number] - [place], [place] - [address] [facility]    Sig: Take ONE cap every PM (0.5 mg every evening)    Class: E-Prescribe    Notes to Pharmacy: TXP DT 12/19/2019 (Kidney), 6/14/2018 (Heart) TXP Dischg DT 12/22/2019 DX Kidney transplant Z94.0 Both @ [facility] ([place], [place])    tacrolimus (GENERIC EQUIVALENT) 1 MG capsule  30 capsule 11 8/11/2020    [id number] - [place], [place] - [address] AT [facility]  "[address]    Sig: Take ONE cap every AM (1 mg every morning)    Class: E-Prescribe    Notes to Pharmacy: TXP DT 12/19/2019 (Kidney), 6/14/2018 (Heart) TXP Dischg DT 12/22/2019 DX Kidney [facility] Z94.0 [facility] [place]    Zinc Sulfate (ZINC 15 PO)            Class: Historical    Route: Oral      Clinic-Administered Medications as of 12/9/2020       Dose Frequency Start End    diatrizoate meglumine-sodium (GASTROGRAFIN/GASTROVIEW) 66-10 % solution 480 mL 480 mL ONCE 3/26/2019     Admin Instructions: MUST DILUTE before giving.  For every 5 mL of Gastroview, dilute with 8 oz water or non-pulp juice.    Route: Rectal          ROS:   Constitutional: No fever, chills, or sweats. Weight up slightly.   ENT: No visual disturbance, ear ache, epistaxis, sore throat.   Allergies/Immunologic: Negative.   Respiratory: As per HPI.  Cardiovascular: As per HPI.   GI: No nausea, vomiting, hematemesis, melena, or hematochezia.   : No urinary frequency, dysuria, or hematuria.   Integument: Negative.   Psychiatric: Negative.   Neuro: Negative.   Endocrinology: Negative.   Musculoskeletal: Negative    Exam:  BP (!) 150/96   Pulse 85   Ht 1.74 m (5' 8.5\")   Wt 82.1 kg (181 lb)   SpO2 99%   BMI 27.12 kg/m    In general, the patient is a pleasant male in no apparent distress.    HEENT: NC/AT. PERRLA. EOMI.  Sclerae white, not injected.    Neck:  No adenopathy, No thyromegaly.    COR: No jugular venous distention when sitting upright.  RRR.  Normal S1 S2 splits physiologically.  No murmur, rub click, or gallop.    Lungs:  CTA. No rhonchi.    Abdomen: soft, nontender, nondistended.  No organomegaly.  Extremities:  No clubbing, cyanosis, or edema.    Neuro: Alert & Oriented x 3, grossly non focal.  Integument: no open lesions, rashes, or jaundice.    Labs:  CBC RESULTS:   Lab Results   Component Value Date    WBC 4.0 12/09/2020    RBC 4.23 (L) 12/09/2020    HGB 12.5 (L) 12/09/2020    HCT 39.3 (L) 12/09/2020    MCV 93 " 12/09/2020    MCH 29.6 12/09/2020    MCHC 31.8 12/09/2020    RDW 13.1 12/09/2020     12/09/2020       BMP RESULTS:  Lab Results   Component Value Date     12/09/2020    POTASSIUM 3.9 12/09/2020    CHLORIDE 103 12/09/2020    CO2 26 12/09/2020    ANIONGAP 5 12/09/2020     (H) 12/09/2020    BUN 15 12/09/2020    CR 1.11 12/09/2020    GFRESTIMATED 69 12/09/2020    GFRESTBLACK 80 12/09/2020    TRINI 9.3 12/09/2020      LIPID RESULTS:  Lab Results   Component Value Date    CHOL 116 12/09/2020    HDL 47 12/09/2020    LDL 26 12/09/2020    TRIG 214 (H) 12/09/2020    CHOLHDLRATIO 5.0 08/10/2015    NHDL 69 12/09/2020       IMMUNOSUPPRESSANT LEVELS  Lab Results   Component Value Date    TACROL 6.4 11/16/2020    DOSTAC 8:25PM 11/16/20 11/16/2020       No components found for: CK  Lab Results   Component Value Date    MAG 1.8 12/09/2020     Lab Results   Component Value Date    A1C 6.1 (H) 09/14/2020     Lab Results   Component Value Date    PHOS 2.9 12/09/2020     Lab Results   Component Value Date    NTBNPI >175,000 (H) 07/26/2018     Lab Results   Component Value Date    SAITESTMET [facility] 07/15/2020    SAICELL Class I 07/15/2020    [id number] None 07/15/2020    [id number] None 07/15/2020    SAIREPCOM  07/15/2020      Test performed by modified procedure. Serum heat inactivated and tested   by a modified ([place]) protocol including fetal calf serum addition.   High-risk, mfi >3,000. Mod-risk, mfi 500-3,000.       Lab Results   Component Value Date    SAIITESTME [facility] 07/15/2020    SAIICELL Class II 07/15/2020    [id number] None 07/15/2020    [id number] None 07/15/2020    SAIIREPCOM  07/15/2020      Test performed by modified procedure. Serum heat inactivated and tested   by a modified ([place]) protocol including fetal calf serum addition.   High-risk, mfi >3,000. Mod-risk, mfi 500-3,000.       Lab Results   Component Value Date    CSPEC Plasma, EDTA anticoagulant 07/15/2020       Assessment and Plan:  Mr. Gao  Bharat is a 65yr old male with a history of NICM s/p OHT 6/14/18, HTN, HL, DMII, and ESRD (on hemodialysis since fall 2017, now s/p renal transplant 12/2019) who presents to clinic for routine follow up.     NICM, s/p OHT 6/14/18  His post-transplant course has been extremely complicated, and includes:  - leukocytosis  - RIJ thrombus infected with CoNS  - incidental pneumoperitoneum  - neutropenia  - oral mucosal ulcer secondary to neutropenia with Klebsiella infection  - pseudomonas bacteremia, resolved  - perforation of the small bowel, s/p small bowel resection and ileostomy, now s/p ileostomy takedown 3/27/19  - end-stage renal disease requiring hemodialysis since 2017, now s/p renal transplant 12/2019     His biopsies have remained negative for rejection.  AlloMap scores have been 37.  He has no DSAs.  His angiograms have shown showed donor coronary disease in all three coronary arteries, including a 40% mLAD lesion and 80% OM lesion; last coronary angiogram 7/2020 remained stable.  RHC 7/2020 showed normal biventricular filling pressures, with RA 6, mPA 18, PCW 9, and CI 2.8.  cMRI 7/2020 showed normal biventricular function (LVEF 63% and RVEF 63%), and was negative for ischemia/fibrosis.      Labs from today showed stable electrolytes, renal function, liver function, and blood counts.  FLP shows elevated triglycerides.  CMV, EBV, DSAs, ImmuKnow, and AlloMap pending.  Tacro level pending.    TTE from today showed stable graft function, with LVEF 65-70% and normal RV size/function.      Mr. Nicholson appears well today.  His weight is up slightly, but he appears euvolemic today.  His BPs remain controlled.  Advised that he continue his current meds, with no changes, and will plan for him to f/up per protocol or sooner should new concerns arise.      Serostatus:  - CMV D+/R-  - EBV D+/R+  - Toxo D?/R-    Immunosuppression:  - MMF 750mg twice daily  - tacro, goal level 6-8.  Level today in process.    PPx:  - CAV:  " Aspirin 81mg daily and atorvastatin 40mg daily  - GI:  Lactobacillus, pantoprazole 20mg every other day  - Osteoporosis:  Calcium/vitamin D supplements  - PCP:  On bactrim indefinitely per renal team    Graft function:  - BPs:  Controlled, not on antihypertensives  - fluid status:  Euvolemic, not on diuretics    Health maintenance:  - received first shingrex, will get second this week  - received Flu shot  - pneumo shots UTD  - Derm exam UTD  - Eye exam UTD  - Dental exam overdue  - last [facility] 2018, due 8549-1469    ESRD s/p renal transplant (12/2019)  Follows with renal team, creatinine remains stable.    DMII  Follows with endocrine.    Pancreatic cyst surveillance  MRI 4/2019 showed \"numerous pancreatic cystic foci are similar to those seen on 4/19/2018 with minimal increase in size of one of these cystic foci arising from the pancreatic body (now measuring 14mm, previously 13mm). These remain most consistent with side branch intraductal papillary mucinous neoplasms. No suspicious features on this unenhanced exam.\" Plan to repeat abdominal MRI q1-2 years per GI.        The above was reviewed with  Bharat, who verbalized understanding and will call with further questions/concerns.    30 minutes spent with patient, with >50% in counseling and/or coordination of care as described above.      Cleo Marks, FRANK, FNP-BC, CHFN  Advanced Heart Failure Nurse Practitioner  [facility]  Patient Care Team:  Emilia Knutson MD as PCP - General (Family Practice)  Arcelia Blum MD as MD (Cardiology)  Kristi Armas PA as Physician Assistant (Physician Assistant)  Jaky Canela as Clinic Care Coordinator  Ana Luisa Mcpherson MD as MD (Nephrology)  Lillie Ulloa, TONY as Transplant Coordinator  [facility] (HOME HEALTH AGENCY ([facility] ([place]))  Abram Moulton MD as MD (Family Practice)  Kaylin Fay, APRN CNP as Nurse Practitioner (Nurse " Practitioner)  Eduardo Lea, [facility] as Pharmacist (Pharmacist)  Mariangel Merida MD as MD (INTERNAL MEDICINE - ENDOCRINOLOGY, DIABETES & METABOLISM)  Mariangel Merida MD as Assigned Endocrinology Provider  Noel Clemons DPM as Assigned Musculoskeletal Provider  Shira Campbell MD as Assigned Surgical Provider  Markell Clemons MD as Assigned Rheumatology Provider  Macie, Giovany Poe MD as Assigned Nephrology Provider  Ana Luisa oCleman APRN CNP as Assigned PCP  Klever Ernst MD as Assigned Heart and Vascular Provider  SELF, REFERRED

## 2020-12-09 NOTE — LETTER
12/9/2020      RE: Murray Nicholson  [address]  [place] [address]       Dear Colleague,    Thank you for the opportunity to participate in the care of your patient, Murray Nicholson, at the [facility] [place] [facility]. Please see a copy of my visit note below.    ADULT HEART TRANSPLANT CLINIC  December 9, 2020    HPI:     Doing well  Exercising regularly, now using elliptical at home  BPs 110-130/80-90s  Weight up a little, running 181, wants to get to 175    Breathing good, no sob pnd orthopnea  Appetite good, no nvdc  Hydrating well  No retention  No fevers chills cough sore throat  No cp palps dizzy falls headaches acute vision changes    Received first shingrex, will get second this week  Flu shot  pneumo shots UTD  Derm recently  Eye exam utd  Dental exam overdue  Crary 2018, due 2015-9046    Taking zinc  Endo rec ca/vit d          Patient Active Problem List    Diagnosis Date Noted     Need for pneumocystis prophylaxis 06/28/2020     Priority: Medium     Aftercare following organ transplant 12/24/2019     Priority: Medium     HTN, kidney transplant related 12/24/2019     Priority: Medium     Secondary renal hyperparathyroidism (H) 12/24/2019     Priority: Medium     Vitamin D deficiency 12/24/2019     Priority: Medium     Hypomagnesemia 12/24/2019     Priority: Medium     SHEELA on CPAP 12/24/2019     Priority: Medium     Kidney replaced by transplant 12/19/2019     Priority: Medium     Pancreatic cyst 11/13/2019     Priority: Medium     Immunosuppression (H)      Priority: Medium     Type 2 diabetes mellitus (H)      Priority: Medium     Status post coronary angiogram 06/28/2019     Priority: Medium     Heart replaced by transplant (H) 12/10/2018     Priority: Medium     Added automatically from request for surgery [id number]       Sigmoid diverticulitis 08/14/2018     Priority: Medium     Dyslipidemia      Priority: Medium     MGUS  (monoclonal gammopathy of unknown significance)      Priority: Medium     Ascending aortic aneurysm (H)      Priority: Medium     Coronary artery disease involving native artery of transplanted heart without angina pectoris 07/10/2014     Priority: Medium     Anemia in chronic renal disease 04/12/2013     Priority: Medium     Problem list name updated by automated process. Provider to review and confirm       Aortic stenosis 08/13/2008     Priority: Medium     Overview:   Bicuspid aortic valve       Allergic rhinitis 04/05/2006     Priority: Medium     Problem list name updated by automated process. Provider to review       Esophageal reflux 08/12/2004     Priority: Medium       PAST MEDICAL HISTORY:  Past Medical History:   Diagnosis Date     (HFpEF) heart failure with preserved ejection fraction (H)      Allergic rhinitis, cause unspecified      Anemia of chronic kidney failure      AS (aortic stenosis)      Ascending aortic aneurysm (H)      Bicuspid aortic valve      CAD (coronary artery disease)      Congestive heart failure, unspecified      Dialysis patient (H)     Tues-Thur-Sat     Dyslipidemia      Esophageal reflux      ESRD (end stage renal disease) (H)      Hearing problem      Heart replaced by transplant (H) 12/10/2018     Hypersomnia with sleep apnea, unspecified      Hypertension      Ileostomy status (H)      Immunosuppression (H)      MGUS (monoclonal gammopathy of unknown significance)      Mitral regurgitation      SHEELA (obstructive sleep apnea)     No CPAP     Pneumonia      Systolic heart failure (H)      Type 2 diabetes mellitus (H)        CURRENT MEDICATIONS:  Prescription Medications as of 12/9/2020       Rx Number Disp Refills Start End Last Dispensed Date Next Fill Date Owning Pharmacy    acetaminophen (TYLENOL) 325 MG tablet  50 tablet 1 12/22/2019    [facility] - [place], [place] - [address]    Sig: Take 2 tablets (650 mg) by mouth every 4 hours as needed for  other (multimodal surgical pain management along with NSAIDS and opioid medication as indicated based on pain control and physical function.)    Class: Local Print    Route: Oral    aspirin 81 MG EC tablet            Sig: Take 81 mg by mouth daily    Class: Historical    Route: Oral    atorvastatin (LIPITOR) 40 MG tablet  90 tablet 3 10/14/2020    [id number] - [place], [place] - [address] [facility]    Sig: Take 1 tablet (40 mg) by mouth daily    Class: E-Prescribe    Route: Oral    biotin 1000 MCG TABS tablet        [id number] - [place], [place] - [address] [facility]    Sig: Take 1,000 mcg by mouth daily Patient takes every other day    Class: Historical    Route: Oral    blood glucose (ACCU-CHEK GUIDE) test strip  300 strip 3 11/17/2020    [id number] - [place], [place] - [address]    Sig: Use to test blood sugar 3 times daily or as directed.    Class: E-Prescribe    Cosign for Ordering: Required by Mariangel Merida MD    blood glucose monitoring (ACCU-CHEK FASTCLIX) lancets  300 each 3 11/17/2020    [id number] - [place], [place] - [address]    Sig: USE TO TEST 3 TIMES DAILY OR AS DIRECTED.    Class: E-Prescribe    Cosign for Ordering: Required by Mariangel Merida MD    calcium citrate and vitamin D (CITRACAL) 200-250 MG-UNIT TABS per tablet        [id number] - [place], [place] - [address]    Sig: Take 1 tablet by mouth 2 times daily    Class: Historical    Route: Oral    famotidine (PEPCID) 20 MG tablet  60 tablet 3 10/13/2020    [facility] #[id number] - [place], [place] - [address]    Sig: TAKE 1 TABLET BY MOUTH TWICE DAILY    Class: E-Prescribe    fluticasone (FLONASE) 50 MCG/ACT nasal spray  11.1 mL 0 6/30/2020    [facility][id number]   [place], [place] - [address] AT [facility]    Sig: Matinicus 1 spray into both nostrils daily    Class: E-Prescribe    Route: Both Nostrils    Lactobacillus (ACIDOPHILUS PO)        [facility] #[id number] - [place], [place] - [address] [facility]    Si billion active cultures    Class: Historical    Route: Oral    loratadine (CLARITIN) 10 MG tablet        [facility] #[id number] - [place], [place] - [address] [facility]    Sig: Take 10 mg by mouth daily Patient takes at bedtime    Class: Historical    Route: Oral    magnesium oxide (MAG-OX) 400 MG tablet  90 tablet 3 2020    [facility] #[id number] - [place], [place] - [address] AT [facility]    Sig: Total dose = 800 mg at lunch time and 400 mg at dinner time    Class: E-Prescribe    metFORMIN (GLUCOPHAGE-XR) 500 MG 24 hr tablet  360 tablet 1 2020    [facility] #[id number] - [place], [place] - [address] [facility]    Sig: Take 4 tablets (2,000 mg) by mouth daily (with breakfast)    Class: E-Prescribe    Route: Oral    Multiple Vitamin (DAILY-DONY) TABS  90 tablet 3 2020    [facility] #[id number] - [place], [place] - [address]    Sig: TAKE 1 TABLET BY MOUTH DAILY    Class: E-Prescribe    mycophenolate (GENERIC EQUIVALENT) 250 MG capsule  180 capsule 11 2020    [address] 0-070    Sig: Take 3 capsules (750 mg) by mouth 2 times daily    Class: E-Prescribe    Route: Oral    pantoprazole (PROTONIX) 20 MG EC tablet  90 tablet 1 2020    [facility] #[id number] - [place], [place] - [address] [facility]    Sig: Take 1 tablet (20 mg) by mouth every other day    Class: E-Prescribe    Route: Oral    sodium bicarbonate 650 MG tablet  120 tablet 2 2020    [facility] #[id number] - [place],  [place] - [address] [facility]    Sig: Take 2 tablets (1,300 mg) by mouth daily    Class: E-Prescribe    Route: Oral    sulfamethoxazole-trimethoprim (BACTRIM/SEPTRA) 400-80 MG tablet  30 tablet 11 1/20/2020    [id number] - [place], [place] - [address] [facility]    Sig: Take 1 tablet by mouth daily    Class: E-Prescribe    Route: Oral    tacrolimus (GENERIC EQUIVALENT) 0.5 MG capsule  6030 capsule 11 8/11/2020    [id number] - [place], [place] - [address] [facility]    Sig: Take ONE cap every PM (0.5 mg every evening)    Class: E-Prescribe    Notes to Pharmacy: TXP DT 12/19/2019 (Kidney), 6/14/2018 (Heart) TXP Dischg DT 12/22/2019 DX Kidney transplant Z94.0 [facility] ([place], [place])    tacrolimus (GENERIC EQUIVALENT) 1 MG capsule  30 capsule 11 8/11/2020    [id number] - [place], [place] - [address] [facility]    Sig: Take ONE cap every AM (1 mg every morning)    Class: E-Prescribe    Notes to Pharmacy: TXP DT 12/19/2019 (Kidney), 6/14/2018 (Heart) TXP Dischg DT 12/22/2019 DX Kidney transplant Z94.0 Both @ [facility] ([place], [place])    Zinc Sulfate (ZINC 15 PO)            Class: Historical    Route: Oral      Clinic-Administered Medications as of 12/9/2020       Dose Frequency Start End    diatrizoate meglumine-sodium (GASTROGRAFIN/GASTROVIEW) 66-10 % solution 480 mL 480 mL ONCE 3/26/2019     Admin Instructions: MUST DILUTE before giving.  For every 5 mL of Gastroview, dilute with 8 oz water or non-pulp juice.    Route: Rectal          ROS:   Constitutional: No fever, chills, or sweats. No weight gain/loss.   ENT: No visual disturbance, ear ache, epistaxis, sore throat.   Allergies/Immunologic: Negative.   Respiratory: No cough, hemoptysis.   Cardiovascular: As per HPI.   GI: No nausea, vomiting, hematemesis, melena, or hematochezia.   : No urinary frequency, dysuria, or  "hematuria.   Integument: Negative.   Psychiatric: Negative.   Neuro: Negative.   Endocrinology: Negative.   Musculoskeletal: Negative    Exam:  BP (!) 150/96   Pulse 85   Ht 1.74 m (5' 8.5\")   Wt 82.1 kg (181 lb)   SpO2 99%   BMI 27.12 kg/m    In general, the patient is a pleasant male in no apparent distress.    HEENT: NC/AT. PERRLA. EOMI.  Sclerae white, not injected.    Neck:  No adenopathy, No thyromegaly.    COR: No jugular venous distention.  RRR.  Normal S1 S2 splits physiologically.  No murmur, rub click, or gallop.    Lungs:  CTA. No rhonchi.    Abdomen: soft, nontender, nondistended.  No organomegaly.  Extremities:  No clubbing, cyanosis, or edema.    Neuro: Alert & Oriented x 3, grossly non focal.    Labs:  CBC RESULTS:   Lab Results   Component Value Date    WBC 4.0 12/09/2020    RBC 4.23 (L) 12/09/2020    HGB 12.5 (L) 12/09/2020    HCT 39.3 (L) 12/09/2020    MCV 93 12/09/2020    MCH 29.6 12/09/2020    MCHC 31.8 12/09/2020    RDW 13.1 12/09/2020     12/09/2020       BMP RESULTS:  Lab Results   Component Value Date     12/09/2020    POTASSIUM 3.9 12/09/2020    CHLORIDE 103 12/09/2020    CO2 26 12/09/2020    ANIONGAP 5 12/09/2020     (H) 12/09/2020    BUN 15 12/09/2020    CR 1.11 12/09/2020    GFRESTIMATED 69 12/09/2020    GFRESTBLACK 80 12/09/2020    TRINI 9.3 12/09/2020      LIPID RESULTS:  Lab Results   Component Value Date    CHOL 116 12/09/2020    HDL 47 12/09/2020    LDL 26 12/09/2020    TRIG 214 (H) 12/09/2020    CHOLHDLRATIO 5.0 08/10/2015    NHDL 69 12/09/2020       IMMUNOSUPPRESSANT LEVELS  Lab Results   Component Value Date    TACROL 6.4 11/16/2020    DOSTAC 8:25PM 11/16/20 11/16/2020       No components found for: CK  Lab Results   Component Value Date    MAG 1.8 12/09/2020     Lab Results   Component Value Date    A1C 6.1 (H) 09/14/2020     Lab Results   Component Value Date    PHOS 2.9 12/09/2020     Lab Results   Component Value Date    NTBNPI >175,000 (H) 07/26/2018 " "    Lab Results   Component Value Date    SAITESTMET [facility] 07/15/2020    SAICELL Class I 07/15/2020    [id number] None 07/15/2020    GV0ZBENQHB None 07/15/2020    SAIREPCOM  07/15/2020      Test performed by modified procedure. Serum heat inactivated and tested   by a modified ([place]) protocol including fetal calf serum addition.   High-risk, mfi >3,000. Mod-risk, mfi 500-3,000.       Lab Results   Component Value Date    SAIITESTME [facility] 07/15/2020    SAIICELL Class II 07/15/2020    AP8KTFATH None 07/15/2020    SA3KMPFDCZ None 07/15/2020    SAIIREPCOM  07/15/2020      Test performed by modified procedure. Serum heat inactivated and tested   by a modified ([place]) protocol including fetal calf serum addition.   High-risk, mfi >3,000. Mod-risk, mfi 500-3,000.       Lab Results   Component Value Date    CSPEC Plasma, EDTA anticoagulant 07/15/2020       Diagnostic Studies:  No results found for this or any previous visit (from the past 4320 hour(s)).    Assessment and Plan:  Change in immunosuppression: {YES NO:384712}  Reason for Change: {Not Applicable or free text:868737::\"***\"}  Other Changes: ***  Follow-Up: ***    Next Biopsy: ***  Next Angiogram: ***  Next Angiogram with IVUS: {YES NO:163569}  Next Stress Test: ***     CC  Patient Care Team:  Emilia Knutson MD as PCP - General (Family Practice)  Arcelia Blum MD as MD (Cardiology)  Kristi Armas PA as Physician Assistant (Physician Assistant)  Jaky Canela as Clinic Care Coordinator  Ana Luisa Mcpherson MD as MD (Nephrology)  Lillie Ulloa RN as Transplant Coordinator  [facility] [facility] ([facility] ([facility]  Abram Moulton MD as MD (Family Practice)  Kaylin Fay, APRN CNP as Nurse Practitioner (Nurse Practitioner)  Eduardo Lea [facility] as Pharmacist (Pharmacist)  Mariangel Merida MD as MD (INTERNAL MEDICINE - ENDOCRINOLOGY, DIABETES & METABOLISM)  Mariangel Merida MD as Assigned " Endocrinology Provider  Noel Clemons DPM as Assigned Musculoskeletal Provider  Shira Campbell MD as Assigned Surgical Provider  Markell Clemons MD as Assigned Rheumatology Provider  Giovany Quijano MD as Assigned Nephrology Provider  Ana Luisa Coleman APRN CNP as Assigned PCP  Klever Ernst MD as Assigned Heart and Vascular Provider  SELF, REFERRED      Please do not hesitate to contact me if you have any questions/concerns.     Sincerely,     Cleo Marks NP

## 2020-12-10 LAB
CMV DNA SPEC NAA+PROBE-ACNC: NORMAL [IU]/ML
CMV DNA SPEC NAA+PROBE-LOG#: NORMAL {LOG_IU}/ML
DONOR IDENTIFICATION: NORMAL
DONOR IDENTIFICATION: NORMAL
DSA COMMENTS: NORMAL
DSA COMMENTS: NORMAL
DSA PRESENT: NO
DSA PRESENT: NO
DSA TEST METHOD: NORMAL
DSA TEST METHOD: NORMAL
EBV DNA # SPEC NAA+PROBE: NORMAL {COPIES}/ML
EBV DNA SPEC NAA+PROBE-LOG#: NORMAL {LOG_COPIES}/ML
ORGAN: NORMAL
ORGAN: NORMAL
SA1 CELL: NORMAL
SA1 COMMENTS: NORMAL
SA1 HI RISK ABY: NORMAL
SA1 MOD RISK ABY: NORMAL
SA1 TEST METHOD: NORMAL
SA2 CELL: NORMAL
SA2 COMMENTS: NORMAL
SA2 HI RISK ABY UA: NORMAL
SA2 MOD RISK ABY: NORMAL
SA2 TEST METHOD: NORMAL
SPECIMEN SOURCE: NORMAL
UNACCEPTABLE ANTIGEN: NORMAL
UNOS CPRA: 0

## 2020-12-11 LAB
ALLOMAP SCORE (EXTERNAL): 37
IMMUKNOW IMMUNE CELL FUNCTION: 157 NG/ML
NEGATIVE PREDICTIVE VALUE PERCENT (EXTERNAL): 98.4 %
POSITIVE PREDICTIVE VALUE PERCENT (EXTERNAL): NORMAL %

## 2020-12-13 DIAGNOSIS — E11.29 TYPE 2 DIABETES MELLITUS WITH OTHER DIABETIC KIDNEY COMPLICATION, WITHOUT LONG-TERM CURRENT USE OF INSULIN (H): ICD-10-CM

## 2020-12-13 RX ORDER — METFORMIN HCL 500 MG
2000 TABLET, EXTENDED RELEASE 24 HR ORAL
Qty: 360 TABLET | Refills: 1 | Status: SHIPPED | OUTPATIENT
Start: 2020-12-13 | End: 2021-06-03

## 2020-12-14 NOTE — RESULT ENCOUNTER NOTE
Allomap score consistent with previous scores  Immuknow low 157, recheck at annual.  Tac level at goal

## 2020-12-16 ENCOUNTER — TELEPHONE (OUTPATIENT)
Dept: NEPHROLOGY | Facility: CLINIC | Age: 65
End: 2020-12-16

## 2020-12-16 ENCOUNTER — TELEPHONE (OUTPATIENT)
Dept: TRANSPLANT | Facility: CLINIC | Age: 65
End: 2020-12-16

## 2020-12-16 DIAGNOSIS — Z79.899 ENCOUNTER FOR LONG-TERM CURRENT USE OF MEDICATION: ICD-10-CM

## 2020-12-16 DIAGNOSIS — Z48.298 AFTERCARE FOLLOWING ORGAN TRANSPLANT: ICD-10-CM

## 2020-12-16 DIAGNOSIS — Z94.0 KIDNEY REPLACED BY TRANSPLANT: Primary | ICD-10-CM

## 2020-12-16 NOTE — LETTER
OUTPATIENT LABORATORY TEST ORDER    Patient Name: Murray Nicholson  Transplant Date: 12/19/2019   YOB: 1955  Issue Date & Time: 12/16/2020  1:58 PM  [facility] MR: [id number] Exp. Date (1 year after date issued)      Diagnoses: Kidney Transplant (ICD-10  Z94.0)   Long term use of medications (ICD-10  Z79.899)     Lab results to be available on the same day drawn.   Patient should release information to the [facility].  Please fax to the Transplant Center at [phone].    Monthly   ?Hemogram and Platelet  ?Basic Metabolic Panel (Sodium, Potassium, Chloride, CO2, Creatinine, Urea Nitrogen,     Glucose,   Calcium)         ?/Tacrolimus/Prograf drug level          ?BK (Polyoma Virus) PCR Quantitative - Plasma                                              Every 6 Months                  ?Urine for protein/creatinine    Yearly:   ?PRA/DSA level (mailers provided by the patient)     If you have any questions, please call The Transplant Center at [phone] or [phone].    Please fax labs to [phone]  .

## 2020-12-16 NOTE — TELEPHONE ENCOUNTER
Post Kidney and Pancreas Transplant Team Conference  Date: 12/16/2020  Transplant Coordinator: Cami Villarreal     Attendees:  [x]  Dr. Quijano  [x] Patt Johns LPN     [x]  Dr. Ulloa [x] Edith Ward, TONY [] Donna Wheat LPN   [x]  Dr. Sanchez [] Marielena Romero, TONY    [x]  Dr. Melgar [] Radha Gray RN [x] Eduardo Lea, PharmD   [x] Dr. Ayala [x] Jennifer Villarreal, TONY    [x] Dr. Cheney [] Fantasma Dorsey RN    [] Dr. Chase [] Autumn Mendez RN    [] Dr. Cowan [] Kari Carr RN    []  Dr. Dwyer [] Anastasia Tanner, TONY    [] Surgery Fellow [x] Alma Rosa Monique RN    [] Maliha Jesus, NP [] Scarlet Haines RN        Verbal Plan Read Back:   No changes at this time    Routed to RN Coordinator   Patt Johns LPN

## 2020-12-16 NOTE — TELEPHONE ENCOUNTER
1 year Kidney and Pancreas Transplant Patient Phone Call    Congratulations on your 1 year post-transplant anniversary!    Please re-review with the patient how to contact the Transplant Center:  Best phone contact is [phone]. [employer] also works well for non-urgent communication.    When the patient should contact the Transplant Center:    If you run out of your immunosuppression medications.     Prolonged illness that is not resolved after recommendations of the PCP, such as frequent UTI.    Viral infections such as : Shingles (herpes-zoster), CMV, EBV, and Influenza    Cancer    Increased creatinine or pain over your graft site.   Please review the patient's baseline creatinine with them: 1 - 1.2     Hospitalizations at facilities other than [facility].    When the patient should visit their local ED or Urgent Care:    Severe dehydration (uncontrolled nausea, vomiting, diarrhea).    Unable to urinate.    Fevers >101    Other medical emergencies.      Medications:  Your tacrolimus will continue to be managed by the Heart team.     Please complete medication reconciliation and ensure the patient has an up to date medication card at home.   *ALWAYS BRING YOUR MED CARD TO APPOINTMENTS.*    Please confirm if the patient is independent with medication set up and administration: Yes:  Date  .     Please review if the patient has had any incidents of missed medications: No  If yes, in the last 1 month: no    Labs:  DUE NOW (for 1 year check):    Hemoglobin A1c    Uric Acid    Urine protein/creatinine    PTH    Vitamin D    Allosure  Please review current lab frequency with the patient after 1 year post transplant:  Continue monthly transplant labs.  Contact the Transplant Center if additional lab orders are needed.  Please update and mail lab letter and orders.    General Transplant Recommendations:    Bi-annual nephrology visits with our MDs.    yearly follow up with your primary care provider (PCP)     Follow up with  specialty providers as directed (**Ensure follow up with dermatology NOW if not yet completed this year).    Frequent reviews of the transplant handbook and / or [facility].    Lab review on MyChart.   If the patient does not utilize MyChart, please record how the patient monitors their lab values:     Jennifer Villarreal RN, BSN, [facility]  Transplant Care Coordinator

## 2020-12-16 NOTE — LETTER
Congratulations on your 1 year post-transplant anniversary!    *The best phone contact for the Transplant Office is [phone] option 5. [employer] also works well for non-urgent communication.  When you should contact the Transplant Center:    If you run out of your immunosuppression medications.     Prolonged illness that is not resolved after recommendations of the PCP, such as frequent UTI.    Viral infections such as : Shingles (herpes-zoster), CMV, EBV, and Influenza    Cancer    Increased creatinine or pain over your graft site (your baseline = 1.0 - 1.2)    Hospitalizations at facilities other than [facility].    When you should visit their local ED or Urgent Care:    Severe dehydration (uncontrolled nausea, vomiting, diarrhea).    Unable to urinate.    Fevers >101    Other medical emergencies.    Medications:    ALWAYS BRING YOUR MED CARD TO APPOINTMENTS.    Please keep your medical insurance up to date.    Do NOT miss your immunosuppression medications.    Labs:    Continue monthly labs - see update lab letter via [employer].    Contact the Transplant Center if additional lab orders are needed.        General Transplant Recommendations:    Bi-annual nephrology visits with our MDs (after 12/2020 visit, due 6/2021) *Notify your RNCC if this is not scheduled.    Yearly follow up with your primary care provider (PCP)     Follow up with specialty providers as directed (ensure follow up with dermatology, if not done yet).    Frequent reviews of the transplant handbook and / or My Transplant Place.    Lab review on [employer].     Jennifer Villarreal, RN, BSN, [facility]  Transplant Care Coordinator

## 2020-12-17 ENCOUNTER — OFFICE VISIT (OUTPATIENT)
Dept: NEPHROLOGY | Facility: CLINIC | Age: 65
End: 2020-12-17
Attending: INTERNAL MEDICINE
Payer: MEDICARE

## 2020-12-17 VITALS
SYSTOLIC BLOOD PRESSURE: 131 MMHG | WEIGHT: 183.9 LBS | OXYGEN SATURATION: 100 % | HEART RATE: 90 BPM | TEMPERATURE: 98.1 F | DIASTOLIC BLOOD PRESSURE: 85 MMHG | BODY MASS INDEX: 27.56 KG/M2

## 2020-12-17 DIAGNOSIS — Z48.298 AFTERCARE FOLLOWING ORGAN TRANSPLANT: Primary | ICD-10-CM

## 2020-12-17 PROCEDURE — 99214 OFFICE O/P EST MOD 30 MIN: CPT

## 2020-12-17 ASSESSMENT — PAIN SCALES - GENERAL: PAINLEVEL: NO PAIN (0)

## 2020-12-17 NOTE — LETTER
12/17/2020       RE: Murray Nicholson  [address]  [place] [address]     Dear Colleague,    Thank you for referring your patient, Murray Nicholson, to the [facility] [place] at [facility]. Please see a copy of my visit note below.    CHRONIC TRANSPLANT NEPHROLOGY VISIT    Assessment & Plan   # LDKT: Stable   - Baseline Cr ~ 1.0-1.2   - Proteinuria: Normal (<0.2 grams)   - Date DSA Last Checked: Apr/2020      Latest DSA: No   - BK Viremia: No   - Kidney Tx Biopsy: No    # Heart Transplant: Appears to be stable.  Followed by Cardiology. Appears stable      # Immunosuppression: Tacrolimus immediate release (goal 4-6 per nephrology otherwise according to cardiology) and Mycophenolate mofetil (dose 750 mg every 12 hours)   - Changes: No    # Infection Prophylaxis:   - PJP: Sulfa/TMP (Bactrim)  - CMV: None, prophylaxis completed    # Hypertension: Controlled;  Goal BP: < 130/80   - Changes: No    # Diabetes: Controlled (HbA1c <7%) Last HbA1c: 6.1%   - Management as per primary care. Will need to keep tighter control over blood glucose. On Metformin    # Anemia in Chronic Renal Disease: Hgb: Stable      ANASTASIYA: No   - Iron studies: Replete    # Mineral Bone Disorder:   - Secondary renal hyperparathyroidism; PTH level: Minimally elevated ( pg/ml)        On treatment: None  - Vitamin D; level: Low        On supplement: Yes  - Calcium; level: Normal        On supplement: No    # Electrolytes:   - Potassium; level: Normal        On supplement: No  - Magnesium; level: Low normal        On supplement: Yes  - Bicarbonate; level: Normal        On supplement: Yes    # PAD: Asymptomatic.    # MGUS: Peak monoclonal protein was ~ 0.5. ELP showing monoclonal IgG immunoglobulin of kappa light chain type, normal K/L ratio.    # GERD: Symptoms controlled    # SHEELA: Patient doesn't appear to have any symptoms, but has not been able to tolerate CPAP.    #  Dermatology: advised following with dermatology     # Medical Compliance: Yes     # COVID-19 Virus Review: Discussed COVID-19 virus and the potential medical risks.  Reviewed preventative health recommendations, which includes washing hands for 20 seconds, avoid touching your face, and social distancing.  Asked patient to inform the transplant center if they are exposed or diagnosed with this virus.    # Transplant History:  Etiology of Kidney Failure: Diabetes mellitus type 2  Tx: LDKT and Heart Tx  Transplant: 12/19/2019 (Kidney), 6/14/2018 (Heart)  Donor Type: Living Donor Class:   Crossmatch at time of Tx: negative  DSA at time of Tx: No  Significant changes in immunosuppression: None  CMV IgG Ab High Risk Discordance (D+/R-): No  EBV IgG Ab High Risk Discordance (D+/R-): No  Significant transplant-related complications: None    Transplant Office Phone Number: [phone]    Assessment and plan was discussed with the patient and he voiced his understanding and agreement.    Return visit: Return in about 1 year (around 12/17/2021).    Gilberto Ulloa MD    Chief Complaint   Mr. Nicholson is a 65 year old here for routine follow up and immunosuppression management.    History of Present Illness   Murray is doing well. Has no new complaints. Had a few questions regarding his light chains testing and we discussed the need to follow up on this periodically. He is taking his medications regularly. He completed his flu vaccine for this year.     Recent Hospitalizations:  [x] No [] Yes    New Medical Issues: [x] No [] Yes    Decreased energy: [x] No [] Yes    Chest pain or SOB with exertion:  [x] No [] Yes    Appetite change or weight change: [x] No [] Yes    Nausea, vomiting or diarrhea:  [x] No [] Yes    Fever, sweats or chills: [x] No [] Yes    Leg swelling: [x] No [] Yes      Home BP: <120    Review of Systems   A comprehensive review of systems was obtained and negative, except as noted in the HPI or PMH.    Problem  List   Patient Active Problem List   Diagnosis     Esophageal reflux     Allergic rhinitis     Anemia in chronic renal disease     Coronary artery disease involving native artery of transplanted heart without angina pectoris     Dyslipidemia     MGUS (monoclonal gammopathy of unknown significance)     Ascending aortic aneurysm (H)     Sigmoid diverticulitis     Heart replaced by transplant (H)     Aortic stenosis     Status post coronary angiogram     Immunosuppression (H)     Type 2 diabetes mellitus (H)     Pancreatic cyst     Kidney replaced by transplant     Aftercare following organ transplant     HTN, kidney transplant related     Secondary renal hyperparathyroidism (H)     Vitamin D deficiency     Hypomagnesemia     SHEELA on CPAP     Need for pneumocystis prophylaxis       Social History   Social History     Tobacco Use     Smoking status: Former Smoker     Packs/day: 1.00     Years: 20.00     Pack years: 20.00     Types: Cigarettes     Start date: 1984     Quit date: 1994     Years since quittin.3     Smokeless tobacco: Never Used   Substance Use Topics     Alcohol use: No     Alcohol/week: 0.0 standard drinks     Drug use: No       Allergies   Allergies   Allergen Reactions     Norco [Hydrocodone-Acetaminophen] Nausea and Vomiting     Cats      Throat tightness     Isosorbide Other (See Comments)     hypotension     Penicillins Hives     Seasonal Allergies      rhinitis     Shrimp      Throat closes        Medications   Current Outpatient Medications   Medication Sig     acetaminophen (TYLENOL) 325 MG tablet Take 2 tablets (650 mg) by mouth every 4 hours as needed for other (multimodal surgical pain management along with NSAIDS and opioid medication as indicated based on pain control and physical function.)     aspirin 81 MG EC tablet Take 81 mg by mouth daily     atorvastatin (LIPITOR) 40 MG tablet Take 1 tablet (40 mg) by mouth daily     biotin 1000 MCG TABS tablet Take 1,000 mcg by mouth daily  Patient takes every other day     blood glucose (ACCU-CHEK GUIDE) test strip Use to test blood sugar 3 times daily or as directed.     blood glucose monitoring (ACCU-CHEK FASTCLIX) lancets USE TO TEST 3 TIMES DAILY OR AS DIRECTED.     calcium citrate and vitamin D (CITRACAL) 200-250 MG-UNIT TABS per tablet Take 1 tablet by mouth 2 times daily     famotidine (PEPCID) 20 MG tablet TAKE 1 TABLET BY MOUTH TWICE DAILY     fluticasone (FLONASE) 50 MCG/ACT nasal spray Spray 1 spray into both nostrils daily     Lactobacillus (ACIDOPHILUS PO) 4 billion active cultures     loratadine (CLARITIN) 10 MG tablet Take 10 mg by mouth daily Patient takes at bedtime     magnesium oxide (MAG-OX) 400 MG tablet Total dose = 800 mg at lunch time and 400 mg at dinner time     metFORMIN (GLUCOPHAGE-XR) 500 MG 24 hr tablet Take 4 tablets (2,000 mg) by mouth daily (with breakfast)     mycophenolate (GENERIC EQUIVALENT) 250 MG capsule Take 3 capsules (750 mg) by mouth 2 times daily     pantoprazole (PROTONIX) 20 MG EC tablet Take 1 tablet (20 mg) by mouth every other day     sodium bicarbonate 650 MG tablet Take 2 tablets (1,300 mg) by mouth daily     sulfamethoxazole-trimethoprim (BACTRIM/SEPTRA) 400-80 MG tablet Take 1 tablet by mouth daily     tacrolimus (GENERIC EQUIVALENT) 0.5 MG capsule Take ONE cap every PM (0.5 mg every evening)     tacrolimus (GENERIC EQUIVALENT) 1 MG capsule Take ONE cap every AM (1 mg every morning)     Zinc Sulfate (ZINC 15 PO)      No current facility-administered medications for this visit.      Facility-Administered Medications Ordered in Other Visits   Medication     diatrizoate meglumine-sodium (GASTROGRAFIN/GASTROVIEW) 66-10 % solution 480 mL     There are no discontinued medications.    Physical Exam   Vital Signs: /85   Pulse 90   Temp 98.1  F (36.7  C)   Wt 83.4 kg (183 lb 14.4 oz)   SpO2 100%   BMI 27.56 kg/m      GENERAL APPEARANCE: alert and no distress  HENT: mouth without ulcers or  lesions  LYMPHATICS: no cervical or supraclavicular nodes  RESP: lungs clear to auscultation - no rales, rhonchi or wheezes  CV: regular rhythm, normal rate, no rub, no murmur  EDEMA: no LE edema bilaterally  ABDOMEN: soft, nondistended, nontender, bowel sounds normal  MS: extremities normal - no gross deformities noted, no evidence of inflammation in joints, no muscle tenderness  SKIN: no rash      Data     Renal Latest Ref Rng & Units 12/9/2020 11/16/2020 10/12/2020   Na 133 - 144 mmol/L 135 136 135   K 3.4 - 5.3 mmol/L 3.9 3.7 4.2   Cl 94 - 109 mmol/L 103 102 104   CO2 20 - 32 mmol/L 26 27 24   BUN 7 - 30 mg/dL 15 16 14   Cr 0.66 - 1.25 mg/dL 1.11 1.21 1.00   Glucose 70 - 99 mg/dL 118(H) 154(H) 160(H)   Ca  8.5 - 10.1 mg/dL 9.3 9.8 9.7   Mg 1.6 - 2.3 mg/dL 1.8 - -     Bone Health Latest Ref Rng & Units 12/9/2020 7/15/2020 6/29/2020   Phos 2.5 - 4.5 mg/dL 2.9 3.2 3.5   PTHi 18 - 80 pg/mL - - -   Vit D Def 20 - 75 ug/L - - -     Heme Latest Ref Rng & Units 12/9/2020 10/12/2020 9/14/2020   WBC 4.0 - 11.0 10e9/L 4.0 4.6 4.6   Hgb 13.3 - 17.7 g/dL 12.5(L) 12.9(L) 13.1(L)   Plt 150 - 450 10e9/L 174 205 188   ABSOLUTE NEUTROPHIL 1.6 - 8.3 10e9/L - - -   ABSOLUTE LYMPHOCYTES 0.8 - 5.3 10e9/L - - -   ABSOLUTE MONOCYTES 0.0 - 1.3 10e9/L - - -   ABSOLUTE EOSINOPHILS 0.0 - 0.7 10e9/L - - -   ABSOLUTE BASOPHILS 0.0 - 0.2 10e9/L - - -   ABS IMMATURE GRANULOCYTES 0 - 0.4 10e9/L - - -   ABSOLUTE NUCLEATED RBC - - - -     Liver Latest Ref Rng & Units 12/9/2020 7/15/2020 6/29/2020   AP 40 - 150 U/L 136 157(H) 159(H)   TBili 0.2 - 1.3 mg/dL 1.1 1.0 1.0   DBili 0.0 - 0.2 mg/dL - - 0.2   ALT 0 - 70 U/L 36 34 32   AST 0 - 45 U/L 22 20 24   Tot Protein 6.8 - 8.8 g/dL 7.3 7.5 7.5   Albumin 3.4 - 5.0 g/dL 3.8 3.9 3.8     Pancreas Latest Ref Rng & Units 9/14/2020 7/15/2020 6/29/2020   A1C 0 - 5.6 % 6.1(H) 6.3(H) 6.1(H)   Amylase 30 - 110 U/L - - -     Iron studies Latest Ref Rng & Units 12/10/2019 6/10/2019 7/10/2018   Iron 35 - 180 ug/dL  84 118 66   Iron sat 15 - 46 % 35 47(H) 28   Ferritin 26 - 388 ng/mL 445(H) 644(H) 771(H)     [facility] Txp Virology Latest Ref Rng & Units 12/9/2020 11/16/2020 10/12/2020   CVM DNA Quant - EDTA PLASMA - -   CMV QUANT IU/ML CMVND:CMV DNA Not Detected [IU]/mL CMV DNA Not Detected - -   LOG IU/ML OF CMVQNT <2.1 [Log:IU]/mL Not Calculated - -   BK Spec - - Plasma Plasma   BK Res BKNEG:BK Virus DNA Not Detected copies/mL - BK Virus DNA Not Detected BK Virus DNA Not Detected   BK Log <2.7 Log copies/mL - Not Calculated Not Calculated   EBV CAPSID ANTIBODY IGG 0.0 - 0.8 AI - - -   EBV DNA COPIES/ML EBVNEG:EBV DNA Not Detected [Copies]/mL EBV DNA Not Detected - -   EBV DNA LOG OF COPIES <2.7 [Log:copies]/mL Not Calculated - -   Hep B Core NR:Nonreactive - - -        Recent Labs   Lab Test 10/12/20  0946 11/16/20  0942 12/09/20  0907   DOSTAC 10/11/20 0950PM 8:25PM 11/16/20 12/8 2125   TACROL 6.0 6.4 7.0     Recent Labs   Lab Test 12/26/19  0720 01/27/20  0918 02/03/20  0948   DOSMPA Not Provided NTP 02/02/20 0915pm   MPACID 1.65 2.39 2.41   MPAG 58.9 54.6 50.4           Again, thank you for allowing me to participate in the care of your patient.      Sincerely,    Kidney/Pancreas Recipient

## 2020-12-17 NOTE — PROGRESS NOTES
CHRONIC TRANSPLANT NEPHROLOGY VISIT    Assessment & Plan   # LDKT: Stable   - Baseline Cr ~ 1.0-1.2   - Proteinuria: Normal (<0.2 grams)   - Date DSA Last Checked: Apr/2020      Latest DSA: No   - BK Viremia: No   - Kidney Tx Biopsy: No    # Heart Transplant: Appears to be stable.  Followed by Cardiology. Appears stable      # Immunosuppression: Tacrolimus immediate release (goal 4-6 per nephrology otherwise according to cardiology) and Mycophenolate mofetil (dose 750 mg every 12 hours)   - Changes: No    # Infection Prophylaxis:   - PJP: Sulfa/TMP (Bactrim)  - CMV: None, prophylaxis completed    # Hypertension: Controlled;  Goal BP: < 130/80   - Changes: No    # Diabetes: Controlled (HbA1c <7%) Last HbA1c: 6.1%   - Management as per primary care. Will need to keep tighter control over blood glucose. On Metformin    # Anemia in Chronic Renal Disease: Hgb: Stable      ANASTASIYA: No   - Iron studies: Replete    # Mineral Bone Disorder:   - Secondary renal hyperparathyroidism; PTH level: Minimally elevated ( pg/ml)        On treatment: None  - Vitamin D; level: Low        On supplement: Yes  - Calcium; level: Normal        On supplement: No    # Electrolytes:   - Potassium; level: Normal        On supplement: No  - Magnesium; level: Low normal        On supplement: Yes  - Bicarbonate; level: Normal        On supplement: Yes    # PAD: Asymptomatic.    # MGUS: Peak monoclonal protein was ~ 0.5. ELP showing monoclonal IgG immunoglobulin of kappa light chain type, normal K/L ratio.    # GERD: Symptoms controlled    # SHEELA: Patient doesn't appear to have any symptoms, but has not been able to tolerate CPAP.    # Dermatology: advised following with dermatology     # Medical Compliance: Yes     # COVID-19 Virus Review: Discussed COVID-19 virus and the potential medical risks.  Reviewed preventative health recommendations, which includes washing hands for 20 seconds, avoid touching your face, and social distancing.  Asked  patient to inform the transplant center if they are exposed or diagnosed with this virus.    # Transplant History:  Etiology of Kidney Failure: Diabetes mellitus type 2  Tx: LDKT and Heart Tx  Transplant: 12/19/2019 (Kidney), 6/14/2018 (Heart)  Donor Type: Living Donor Class:   Crossmatch at time of Tx: negative  DSA at time of Tx: No  Significant changes in immunosuppression: None  CMV IgG Ab High Risk Discordance (D+/R-): No  EBV IgG Ab High Risk Discordance (D+/R-): No  Significant transplant-related complications: None    Transplant Office Phone Number: [phone]    Assessment and plan was discussed with the patient and he voiced his understanding and agreement.    Return visit: Return in about 1 year (around 12/17/2021).    Gilberto Ulloa MD    Chief Complaint   Mr. Nicholson is a 65 year old here for routine follow up and immunosuppression management.    History of Present Illness   Murray is doing well. Has no new complaints. Had a few questions regarding his light chains testing and we discussed the need to follow up on this periodically. He is taking his medications regularly. He completed his flu vaccine for this year.     Recent Hospitalizations:  [x] No [] Yes    New Medical Issues: [x] No [] Yes    Decreased energy: [x] No [] Yes    Chest pain or SOB with exertion:  [x] No [] Yes    Appetite change or weight change: [x] No [] Yes    Nausea, vomiting or diarrhea:  [x] No [] Yes    Fever, sweats or chills: [x] No [] Yes    Leg swelling: [x] No [] Yes      Home BP: <120    Review of Systems   A comprehensive review of systems was obtained and negative, except as noted in the HPI or PMH.    Problem List   Patient Active Problem List   Diagnosis     Esophageal reflux     Allergic rhinitis     Anemia in chronic renal disease     Coronary artery disease involving native artery of transplanted heart without angina pectoris     Dyslipidemia     MGUS (monoclonal gammopathy of unknown significance)     Ascending  aortic aneurysm (H)     Sigmoid diverticulitis     Heart replaced by transplant (H)     Aortic stenosis     Status post coronary angiogram     Immunosuppression (H)     Type 2 diabetes mellitus (H)     Pancreatic cyst     Kidney replaced by transplant     Aftercare following organ transplant     HTN, kidney transplant related     Secondary renal hyperparathyroidism (H)     Vitamin D deficiency     Hypomagnesemia     SHEELA on CPAP     Need for pneumocystis prophylaxis       Social History   Social History     Tobacco Use     Smoking status: Former Smoker     Packs/day: 1.00     Years: 20.00     Pack years: 20.00     Types: Cigarettes     Start date: 1984     Quit date: 1994     Years since quittin.3     Smokeless tobacco: Never Used   Substance Use Topics     Alcohol use: No     Alcohol/week: 0.0 standard drinks     Drug use: No       Allergies   Allergies   Allergen Reactions     Norco [Hydrocodone-Acetaminophen] Nausea and Vomiting     Cats      Throat tightness     Isosorbide Other (See Comments)     hypotension     Penicillins Hives     Seasonal Allergies      rhinitis     Shrimp      Throat closes        Medications   Current Outpatient Medications   Medication Sig     acetaminophen (TYLENOL) 325 MG tablet Take 2 tablets (650 mg) by mouth every 4 hours as needed for other (multimodal surgical pain management along with NSAIDS and opioid medication as indicated based on pain control and physical function.)     aspirin 81 MG EC tablet Take 81 mg by mouth daily     atorvastatin (LIPITOR) 40 MG tablet Take 1 tablet (40 mg) by mouth daily     biotin 1000 MCG TABS tablet Take 1,000 mcg by mouth daily Patient takes every other day     blood glucose (ACCU-CHEK GUIDE) test strip Use to test blood sugar 3 times daily or as directed.     blood glucose monitoring (ACCU-CHEK FASTCLIX) lancets USE TO TEST 3 TIMES DAILY OR AS DIRECTED.     calcium citrate and vitamin D (CITRACAL) 200-250 MG-UNIT TABS per tablet Take  1 tablet by mouth 2 times daily     famotidine (PEPCID) 20 MG tablet TAKE 1 TABLET BY MOUTH TWICE DAILY     fluticasone (FLONASE) 50 MCG/ACT nasal spray Spray 1 spray into both nostrils daily     Lactobacillus (ACIDOPHILUS PO) 4 billion active cultures     loratadine (CLARITIN) 10 MG tablet Take 10 mg by mouth daily Patient takes at bedtime     magnesium oxide (MAG-OX) 400 MG tablet Total dose = 800 mg at lunch time and 400 mg at dinner time     metFORMIN (GLUCOPHAGE-XR) 500 MG 24 hr tablet Take 4 tablets (2,000 mg) by mouth daily (with breakfast)     mycophenolate (GENERIC EQUIVALENT) 250 MG capsule Take 3 capsules (750 mg) by mouth 2 times daily     pantoprazole (PROTONIX) 20 MG EC tablet Take 1 tablet (20 mg) by mouth every other day     sodium bicarbonate 650 MG tablet Take 2 tablets (1,300 mg) by mouth daily     sulfamethoxazole-trimethoprim (BACTRIM/SEPTRA) 400-80 MG tablet Take 1 tablet by mouth daily     tacrolimus (GENERIC EQUIVALENT) 0.5 MG capsule Take ONE cap every PM (0.5 mg every evening)     tacrolimus (GENERIC EQUIVALENT) 1 MG capsule Take ONE cap every AM (1 mg every morning)     Zinc Sulfate (ZINC 15 PO)      No current facility-administered medications for this visit.      Facility-Administered Medications Ordered in Other Visits   Medication     diatrizoate meglumine-sodium (GASTROGRAFIN/GASTROVIEW) 66-10 % solution 480 mL     There are no discontinued medications.    Physical Exam   Vital Signs: /85   Pulse 90   Temp 98.1  F (36.7  C)   Wt 83.4 kg (183 lb 14.4 oz)   SpO2 100%   BMI 27.56 kg/m      GENERAL APPEARANCE: alert and no distress  HENT: mouth without ulcers or lesions  LYMPHATICS: no cervical or supraclavicular nodes  RESP: lungs clear to auscultation - no rales, rhonchi or wheezes  CV: regular rhythm, normal rate, no rub, no murmur  EDEMA: no LE edema bilaterally  ABDOMEN: soft, nondistended, nontender, bowel sounds normal  MS: extremities normal - no gross deformities  noted, no evidence of inflammation in joints, no muscle tenderness  SKIN: no rash      Data     Renal Latest Ref Rng & Units 12/9/2020 11/16/2020 10/12/2020   Na 133 - 144 mmol/L 135 136 135   K 3.4 - 5.3 mmol/L 3.9 3.7 4.2   Cl 94 - 109 mmol/L 103 102 104   CO2 20 - 32 mmol/L 26 27 24   BUN 7 - 30 mg/dL 15 16 14   Cr 0.66 - 1.25 mg/dL 1.11 1.21 1.00   Glucose 70 - 99 mg/dL 118(H) 154(H) 160(H)   Ca  8.5 - 10.1 mg/dL 9.3 9.8 9.7   Mg 1.6 - 2.3 mg/dL 1.8 - -     Bone Health Latest Ref Rng & Units 12/9/2020 7/15/2020 6/29/2020   Phos 2.5 - 4.5 mg/dL 2.9 3.2 3.5   PTHi 18 - 80 pg/mL - - -   Vit D Def 20 - 75 ug/L - - -     Heme Latest Ref Rng & Units 12/9/2020 10/12/2020 9/14/2020   WBC 4.0 - 11.0 10e9/L 4.0 4.6 4.6   Hgb 13.3 - 17.7 g/dL 12.5(L) 12.9(L) 13.1(L)   Plt 150 - 450 10e9/L 174 205 188   ABSOLUTE NEUTROPHIL 1.6 - 8.3 10e9/L - - -   ABSOLUTE LYMPHOCYTES 0.8 - 5.3 10e9/L - - -   ABSOLUTE MONOCYTES 0.0 - 1.3 10e9/L - - -   ABSOLUTE EOSINOPHILS 0.0 - 0.7 10e9/L - - -   ABSOLUTE BASOPHILS 0.0 - 0.2 10e9/L - - -   ABS IMMATURE GRANULOCYTES 0 - 0.4 10e9/L - - -   ABSOLUTE NUCLEATED RBC - - - -     Liver Latest Ref Rng & Units 12/9/2020 7/15/2020 6/29/2020   AP 40 - 150 U/L 136 157(H) 159(H)   TBili 0.2 - 1.3 mg/dL 1.1 1.0 1.0   DBili 0.0 - 0.2 mg/dL - - 0.2   ALT 0 - 70 U/L 36 34 32   AST 0 - 45 U/L 22 20 24   Tot Protein 6.8 - 8.8 g/dL 7.3 7.5 7.5   Albumin 3.4 - 5.0 g/dL 3.8 3.9 3.8     Pancreas Latest Ref Rng & Units 9/14/2020 7/15/2020 6/29/2020   A1C 0 - 5.6 % 6.1(H) 6.3(H) 6.1(H)   Amylase 30 - 110 U/L - - -     Iron studies Latest Ref Rng & Units 12/10/2019 6/10/2019 7/10/2018   Iron 35 - 180 ug/dL 84 118 66   Iron sat 15 - 46 % 35 47(H) 28   Ferritin 26 - 388 ng/mL 445(H) 644(H) 771(H)     [facility] Txp Virology Latest Ref Rng & Units 12/9/2020 11/16/2020 10/12/2020   CVM DNA Quant - EDTA PLASMA - -   CMV QUANT IU/ML CMVND:CMV DNA Not Detected [IU]/mL CMV DNA Not Detected - -   LOG IU/ML OF CMVQNT <2.1  [Log:IU]/mL Not Calculated - -   BK Spec - - Plasma Plasma   BK Res BKNEG:BK Virus DNA Not Detected copies/mL - BK Virus DNA Not Detected BK Virus DNA Not Detected   BK Log <2.7 Log copies/mL - Not Calculated Not Calculated   EBV CAPSID ANTIBODY IGG 0.0 - 0.8 AI - - -   EBV DNA COPIES/ML EBVNEG:EBV DNA Not Detected [Copies]/mL EBV DNA Not Detected - -   EBV DNA LOG OF COPIES <2.7 [Log:copies]/mL Not Calculated - -   Hep B Core NR:Nonreactive - - -        Recent Labs   Lab Test 10/12/20  0946 11/16/20  0942 12/09/20  0907   DOSTAC 10/11/20 0950PM 8:25PM 11/16/20 12/8 2125   TACROL 6.0 6.4 7.0     Recent Labs   Lab Test 12/26/19  0720 01/27/20  0918 02/03/20  0948   DOSMPA Not Provided NTP 02/02/20 0915pm   MPACID 1.65 2.39 2.41   MPAG 58.9 54.6 50.4

## 2020-12-17 NOTE — NURSING NOTE
"Chief Complaint   Patient presents with     RECHECK     Annual Follow Up TX     Vital signs:  Temp: 98.1  F (36.7  C)   BP: 131/85 Pulse: 90     SpO2: 100 %       Weight: 83.4 kg (183 lb 14.4 oz)  Estimated body mass index is 27.56 kg/m  as calculated from the following:    Height as of 12/9/20: 1.74 m (5' 8.5\").    Weight as of this encounter: 83.4 kg (183 lb 14.4 oz).        Marcie Saul, CMA    "

## 2021-01-05 DIAGNOSIS — Z94.0 KIDNEY REPLACED BY TRANSPLANT: ICD-10-CM

## 2021-01-05 RX ORDER — MAGNESIUM OXIDE 400 MG/1
TABLET ORAL
Qty: 90 TABLET | Refills: 3 | Status: ON HOLD | OUTPATIENT
Start: 2021-01-05 | End: 2021-06-07

## 2021-01-12 ENCOUNTER — RESULTS ONLY (OUTPATIENT)
Dept: OTHER | Facility: CLINIC | Age: 66
End: 2021-01-12

## 2021-01-12 DIAGNOSIS — Z48.298 AFTERCARE FOLLOWING ORGAN TRANSPLANT: ICD-10-CM

## 2021-01-12 DIAGNOSIS — Z94.0 KIDNEY REPLACED BY TRANSPLANT: ICD-10-CM

## 2021-01-12 DIAGNOSIS — Z79.899 ENCOUNTER FOR LONG-TERM CURRENT USE OF MEDICATION: ICD-10-CM

## 2021-01-12 LAB
ANION GAP SERPL CALCULATED.3IONS-SCNC: 6 MMOL/L (ref 3–14)
BUN SERPL-MCNC: 17 MG/DL (ref 7–30)
CALCIUM SERPL-MCNC: 9.5 MG/DL (ref 8.5–10.1)
CHLORIDE SERPL-SCNC: 104 MMOL/L (ref 94–109)
CO2 SERPL-SCNC: 26 MMOL/L (ref 20–32)
CREAT SERPL-MCNC: 1.11 MG/DL (ref 0.66–1.25)
CREAT UR-MCNC: 166 MG/DL
ERYTHROCYTE [DISTWIDTH] IN BLOOD BY AUTOMATED COUNT: 13.4 % (ref 10–15)
GFR SERPL CREATININE-BSD FRML MDRD: 69 ML/MIN/{1.73_M2}
GLUCOSE SERPL-MCNC: 208 MG/DL (ref 70–99)
HCT VFR BLD AUTO: 40.8 % (ref 40–53)
HGB BLD-MCNC: 13.1 G/DL (ref 13.3–17.7)
MCH RBC QN AUTO: 29.5 PG (ref 26.5–33)
MCHC RBC AUTO-ENTMCNC: 32.1 G/DL (ref 31.5–36.5)
MCV RBC AUTO: 92 FL (ref 78–100)
PLATELET # BLD AUTO: 201 10E9/L (ref 150–450)
POTASSIUM SERPL-SCNC: 3.9 MMOL/L (ref 3.4–5.3)
PROT UR-MCNC: 0.14 G/L
PROT/CREAT 24H UR: 0.08 G/G CR (ref 0–0.2)
RBC # BLD AUTO: 4.44 10E12/L (ref 4.4–5.9)
SODIUM SERPL-SCNC: 137 MMOL/L (ref 133–144)
TACROLIMUS BLD-MCNC: 9.5 UG/L (ref 5–15)
TME LAST DOSE: NORMAL H
WBC # BLD AUTO: 4.3 10E9/L (ref 4–11)

## 2021-01-12 PROCEDURE — 36415 COLL VENOUS BLD VENIPUNCTURE: CPT | Performed by: PATHOLOGY

## 2021-01-12 PROCEDURE — 80197 ASSAY OF TACROLIMUS: CPT | Performed by: INTERNAL MEDICINE

## 2021-01-12 PROCEDURE — 87799 DETECT AGENT NOS DNA QUANT: CPT | Performed by: INTERNAL MEDICINE

## 2021-01-12 PROCEDURE — 86833 HLA CLASS II HIGH DEFIN QUAL: CPT | Performed by: INTERNAL MEDICINE

## 2021-01-12 PROCEDURE — 80048 BASIC METABOLIC PNL TOTAL CA: CPT | Performed by: PATHOLOGY

## 2021-01-12 PROCEDURE — 85027 COMPLETE CBC AUTOMATED: CPT | Performed by: PATHOLOGY

## 2021-01-12 PROCEDURE — 84156 ASSAY OF PROTEIN URINE: CPT | Performed by: PATHOLOGY

## 2021-01-12 PROCEDURE — 86832 HLA CLASS I HIGH DEFIN QUAL: CPT | Performed by: INTERNAL MEDICINE

## 2021-01-13 LAB
BKV DNA # SPEC NAA+PROBE: NORMAL COPIES/ML
BKV DNA SPEC NAA+PROBE-LOG#: NORMAL LOG COPIES/ML
DONOR IDENTIFICATION: NORMAL
DONOR IDENTIFICATION: NORMAL
DSA COMMENTS: NORMAL
DSA COMMENTS: NORMAL
DSA PRESENT: NO
DSA PRESENT: NO
DSA TEST METHOD: NORMAL
DSA TEST METHOD: NORMAL
ORGAN: NORMAL
ORGAN: NORMAL
SA1 CELL: NORMAL
SA1 COMMENTS: NORMAL
SA1 HI RISK ABY: NORMAL
SA1 MOD RISK ABY: NORMAL
SA1 TEST METHOD: NORMAL
SA2 CELL: NORMAL
SA2 COMMENTS: NORMAL
SA2 HI RISK ABY UA: NORMAL
SA2 MOD RISK ABY: NORMAL
SA2 TEST METHOD: NORMAL
SPECIMEN SOURCE: NORMAL
UNACCEPTABLE ANTIGEN: NORMAL
UNOS CPRA: 0

## 2021-01-13 NOTE — RESULT ENCOUNTER NOTE
Pt called with tac level 9.5; goal 6-8.   Last 5 checks on same dose within goal. Creat no change.  No dose change. Recheck next month.

## 2021-02-08 DIAGNOSIS — R09.89 RUNNY NOSE: ICD-10-CM

## 2021-02-09 DIAGNOSIS — Z48.298 AFTERCARE FOLLOWING ORGAN TRANSPLANT: ICD-10-CM

## 2021-02-09 DIAGNOSIS — Z94.0 KIDNEY REPLACED BY TRANSPLANT: ICD-10-CM

## 2021-02-09 DIAGNOSIS — K21.9 GASTROESOPHAGEAL REFLUX DISEASE WITHOUT ESOPHAGITIS: ICD-10-CM

## 2021-02-09 DIAGNOSIS — Z79.899 ENCOUNTER FOR LONG-TERM CURRENT USE OF MEDICATION: ICD-10-CM

## 2021-02-09 LAB
ANION GAP SERPL CALCULATED.3IONS-SCNC: 9 MMOL/L (ref 3–14)
BUN SERPL-MCNC: 18 MG/DL (ref 7–30)
CALCIUM SERPL-MCNC: 9.4 MG/DL (ref 8.5–10.1)
CHLORIDE SERPL-SCNC: 106 MMOL/L (ref 94–109)
CO2 SERPL-SCNC: 25 MMOL/L (ref 20–32)
CREAT SERPL-MCNC: 0.98 MG/DL (ref 0.66–1.25)
ERYTHROCYTE [DISTWIDTH] IN BLOOD BY AUTOMATED COUNT: 13.3 % (ref 10–15)
GFR SERPL CREATININE-BSD FRML MDRD: 80 ML/MIN/{1.73_M2}
GLUCOSE SERPL-MCNC: 216 MG/DL (ref 70–99)
HCT VFR BLD AUTO: 37.8 % (ref 40–53)
HGB BLD-MCNC: 12.6 G/DL (ref 13.3–17.7)
MCH RBC QN AUTO: 29.9 PG (ref 26.5–33)
MCHC RBC AUTO-ENTMCNC: 33.3 G/DL (ref 31.5–36.5)
MCV RBC AUTO: 90 FL (ref 78–100)
PLATELET # BLD AUTO: 180 10E9/L (ref 150–450)
POTASSIUM SERPL-SCNC: 4 MMOL/L (ref 3.4–5.3)
RBC # BLD AUTO: 4.22 10E12/L (ref 4.4–5.9)
SODIUM SERPL-SCNC: 139 MMOL/L (ref 133–144)
TACROLIMUS BLD-MCNC: 5.9 UG/L (ref 5–15)
TME LAST DOSE: NORMAL H
WBC # BLD AUTO: 4.6 10E9/L (ref 4–11)

## 2021-02-09 PROCEDURE — 80048 BASIC METABOLIC PNL TOTAL CA: CPT | Performed by: PATHOLOGY

## 2021-02-09 PROCEDURE — 80197 ASSAY OF TACROLIMUS: CPT | Performed by: INTERNAL MEDICINE

## 2021-02-09 PROCEDURE — 85027 COMPLETE CBC AUTOMATED: CPT | Performed by: PATHOLOGY

## 2021-02-09 PROCEDURE — 36415 COLL VENOUS BLD VENIPUNCTURE: CPT | Performed by: PATHOLOGY

## 2021-02-09 PROCEDURE — 87799 DETECT AGENT NOS DNA QUANT: CPT | Performed by: INTERNAL MEDICINE

## 2021-02-10 RX ORDER — FLUTICASONE PROPIONATE 50 MCG
SPRAY, SUSPENSION (ML) NASAL
Qty: 16 G | Refills: 11 | Status: SHIPPED | OUTPATIENT
Start: 2021-02-10 | End: 2022-03-22

## 2021-02-12 DIAGNOSIS — Z94.0 KIDNEY REPLACED BY TRANSPLANT: ICD-10-CM

## 2021-02-12 RX ORDER — SULFAMETHOXAZOLE AND TRIMETHOPRIM 400; 80 MG/1; MG/1
1 TABLET ORAL DAILY
Qty: 30 TABLET | Refills: 11 | Status: SHIPPED | OUTPATIENT
Start: 2021-02-12 | End: 2022-02-08

## 2021-02-12 RX ORDER — FAMOTIDINE 20 MG/1
20 TABLET, FILM COATED ORAL 2 TIMES DAILY
Qty: 60 TABLET | Refills: 0 | Status: ON HOLD | OUTPATIENT
Start: 2021-02-12 | End: 2021-06-07

## 2021-03-09 ENCOUNTER — TELEPHONE (OUTPATIENT)
Dept: TRANSPLANT | Facility: CLINIC | Age: 66
End: 2021-03-09

## 2021-03-09 DIAGNOSIS — Z94.1 HEART REPLACED BY TRANSPLANT (H): Primary | ICD-10-CM

## 2021-03-09 DIAGNOSIS — Z94.0 KIDNEY REPLACED BY TRANSPLANT: ICD-10-CM

## 2021-03-09 DIAGNOSIS — Z48.298 AFTERCARE FOLLOWING ORGAN TRANSPLANT: ICD-10-CM

## 2021-03-09 DIAGNOSIS — Z79.899 ENCOUNTER FOR LONG-TERM CURRENT USE OF MEDICATION: ICD-10-CM

## 2021-03-09 LAB
ANION GAP SERPL CALCULATED.3IONS-SCNC: 7 MMOL/L (ref 3–14)
BUN SERPL-MCNC: 14 MG/DL (ref 7–30)
CALCIUM SERPL-MCNC: 9.4 MG/DL (ref 8.5–10.1)
CHLORIDE SERPL-SCNC: 105 MMOL/L (ref 94–109)
CO2 SERPL-SCNC: 25 MMOL/L (ref 20–32)
CREAT SERPL-MCNC: 0.99 MG/DL (ref 0.66–1.25)
ERYTHROCYTE [DISTWIDTH] IN BLOOD BY AUTOMATED COUNT: 13.1 % (ref 10–15)
GFR SERPL CREATININE-BSD FRML MDRD: 79 ML/MIN/{1.73_M2}
GLUCOSE SERPL-MCNC: 178 MG/DL (ref 70–99)
HCT VFR BLD AUTO: 38 % (ref 40–53)
HGB BLD-MCNC: 12.5 G/DL (ref 13.3–17.7)
MCH RBC QN AUTO: 29.9 PG (ref 26.5–33)
MCHC RBC AUTO-ENTMCNC: 32.9 G/DL (ref 31.5–36.5)
MCV RBC AUTO: 91 FL (ref 78–100)
PLATELET # BLD AUTO: 189 10E9/L (ref 150–450)
POTASSIUM SERPL-SCNC: 4 MMOL/L (ref 3.4–5.3)
RBC # BLD AUTO: 4.18 10E12/L (ref 4.4–5.9)
SODIUM SERPL-SCNC: 137 MMOL/L (ref 133–144)
TACROLIMUS BLD-MCNC: 6 UG/L (ref 5–15)
TME LAST DOSE: 2000 H
WBC # BLD AUTO: 4.3 10E9/L (ref 4–11)

## 2021-03-09 PROCEDURE — 85027 COMPLETE CBC AUTOMATED: CPT | Performed by: PATHOLOGY

## 2021-03-09 PROCEDURE — 80197 ASSAY OF TACROLIMUS: CPT | Performed by: INTERNAL MEDICINE

## 2021-03-09 PROCEDURE — 80048 BASIC METABOLIC PNL TOTAL CA: CPT | Performed by: PATHOLOGY

## 2021-03-09 PROCEDURE — 87799 DETECT AGENT NOS DNA QUANT: CPT | Performed by: INTERNAL MEDICINE

## 2021-03-09 PROCEDURE — 36415 COLL VENOUS BLD VENIPUNCTURE: CPT | Performed by: PATHOLOGY

## 2021-03-09 NOTE — TELEPHONE ENCOUNTER
Pt called with tac level -6.   Confirmed 12-hour trough.  Discussed plans for 3rd annual.  Will plan for June 7 and 8th.

## 2021-04-03 ENCOUNTER — HEALTH MAINTENANCE LETTER (OUTPATIENT)
Age: 66
End: 2021-04-03

## 2021-04-13 DIAGNOSIS — Z79.899 ENCOUNTER FOR LONG-TERM CURRENT USE OF MEDICATION: ICD-10-CM

## 2021-04-13 DIAGNOSIS — Z48.298 AFTERCARE FOLLOWING ORGAN TRANSPLANT: ICD-10-CM

## 2021-04-13 DIAGNOSIS — Z94.0 KIDNEY REPLACED BY TRANSPLANT: ICD-10-CM

## 2021-04-13 LAB
ANION GAP SERPL CALCULATED.3IONS-SCNC: 7 MMOL/L (ref 3–14)
BUN SERPL-MCNC: 17 MG/DL (ref 7–30)
CALCIUM SERPL-MCNC: 9.2 MG/DL (ref 8.5–10.1)
CHLORIDE SERPL-SCNC: 104 MMOL/L (ref 94–109)
CO2 SERPL-SCNC: 28 MMOL/L (ref 20–32)
CREAT SERPL-MCNC: 1.12 MG/DL (ref 0.66–1.25)
ERYTHROCYTE [DISTWIDTH] IN BLOOD BY AUTOMATED COUNT: 13.2 % (ref 10–15)
GFR SERPL CREATININE-BSD FRML MDRD: 68 ML/MIN/{1.73_M2}
GLUCOSE SERPL-MCNC: 163 MG/DL (ref 70–99)
HCT VFR BLD AUTO: 39.3 % (ref 40–53)
HGB BLD-MCNC: 12.9 G/DL (ref 13.3–17.7)
MCH RBC QN AUTO: 30.1 PG (ref 26.5–33)
MCHC RBC AUTO-ENTMCNC: 32.8 G/DL (ref 31.5–36.5)
MCV RBC AUTO: 92 FL (ref 78–100)
PLATELET # BLD AUTO: 200 10E9/L (ref 150–450)
POTASSIUM SERPL-SCNC: 3.8 MMOL/L (ref 3.4–5.3)
RBC # BLD AUTO: 4.29 10E12/L (ref 4.4–5.9)
SODIUM SERPL-SCNC: 139 MMOL/L (ref 133–144)
TACROLIMUS BLD-MCNC: 6.2 UG/L (ref 5–15)
TME LAST DOSE: NORMAL H
WBC # BLD AUTO: 4.2 10E9/L (ref 4–11)

## 2021-04-13 PROCEDURE — 80197 ASSAY OF TACROLIMUS: CPT | Performed by: INTERNAL MEDICINE

## 2021-04-13 PROCEDURE — 36415 COLL VENOUS BLD VENIPUNCTURE: CPT | Performed by: PATHOLOGY

## 2021-04-13 PROCEDURE — 87799 DETECT AGENT NOS DNA QUANT: CPT | Performed by: INTERNAL MEDICINE

## 2021-04-13 PROCEDURE — 85027 COMPLETE CBC AUTOMATED: CPT | Performed by: PATHOLOGY

## 2021-04-13 PROCEDURE — 80048 BASIC METABOLIC PNL TOTAL CA: CPT | Performed by: PATHOLOGY

## 2021-04-14 ENCOUNTER — MYC MEDICAL ADVICE (OUTPATIENT)
Dept: TRANSPLANT | Facility: CLINIC | Age: 66
End: 2021-04-14

## 2021-04-20 DIAGNOSIS — Z94.0 KIDNEY REPLACED BY TRANSPLANT: Primary | ICD-10-CM

## 2021-04-20 LAB
CELL TYPE ALLO: NORMAL
CELL TYPE ALLO: NORMAL
CHANNELSHIFTALLOB1: -27
CHANNELSHIFTALLOB1: -77
CHANNELSHIFTALLOT1: -24
CHANNELSHIFTALLOT1: -98
COMMENT ALLOB1: NORMAL
COMMENT ALLOB1: NORMAL
CROSSMATCHDATEALLO: NORMAL
CROSSMATCHDATEALLO: NORMAL
DONOR ALLO: NORMAL
DONOR ALLO: NORMAL
DONORCELLDATE ALLO: NORMAL
DONORCELLDATE ALLO: NORMAL
POS CUT OFF ALLO B: >93
POS CUT OFF ALLO B: >93
POS CUT OFF ALLO T: >79
POS CUT OFF ALLO T: >79
RESULT ALLO B1: NORMAL
RESULT ALLO B1: NORMAL
RESULT ALLO T1: NORMAL
RESULT ALLO T1: NORMAL
SERUM DATE ALLO B1: NORMAL
SERUM DATE ALLO B1: NORMAL
SERUM DATE ALLO T1: NORMAL
SERUM DATE ALLO T1: NORMAL
TESTMETHODALLO: NORMAL
TESTMETHODALLO: NORMAL
TREATMENT ALLO B1: NORMAL
TREATMENT ALLO B1: NORMAL
TREATMENT ALLO T1: NORMAL
TREATMENT ALLO T1: NORMAL

## 2021-04-20 RX ORDER — MYCOPHENOLATE MOFETIL 250 MG/1
750 CAPSULE ORAL 2 TIMES DAILY
Qty: 180 CAPSULE | Refills: 11 | Status: SHIPPED | OUTPATIENT
Start: 2021-04-20 | End: 2022-04-03

## 2021-05-11 DIAGNOSIS — Z79.899 ENCOUNTER FOR LONG-TERM CURRENT USE OF MEDICATION: ICD-10-CM

## 2021-05-11 DIAGNOSIS — Z94.0 KIDNEY REPLACED BY TRANSPLANT: ICD-10-CM

## 2021-05-11 DIAGNOSIS — Z48.298 AFTERCARE FOLLOWING ORGAN TRANSPLANT: ICD-10-CM

## 2021-05-11 LAB
ANION GAP SERPL CALCULATED.3IONS-SCNC: 6 MMOL/L (ref 3–14)
BUN SERPL-MCNC: 18 MG/DL (ref 7–30)
CALCIUM SERPL-MCNC: 9.6 MG/DL (ref 8.5–10.1)
CHLORIDE SERPL-SCNC: 105 MMOL/L (ref 94–109)
CO2 SERPL-SCNC: 27 MMOL/L (ref 20–32)
CREAT SERPL-MCNC: 1.14 MG/DL (ref 0.66–1.25)
ERYTHROCYTE [DISTWIDTH] IN BLOOD BY AUTOMATED COUNT: 13.2 % (ref 10–15)
GFR SERPL CREATININE-BSD FRML MDRD: 66 ML/MIN/{1.73_M2}
GLUCOSE SERPL-MCNC: 191 MG/DL (ref 70–99)
HCT VFR BLD AUTO: 39 % (ref 40–53)
HGB BLD-MCNC: 12.6 G/DL (ref 13.3–17.7)
MCH RBC QN AUTO: 30 PG (ref 26.5–33)
MCHC RBC AUTO-ENTMCNC: 32.3 G/DL (ref 31.5–36.5)
MCV RBC AUTO: 93 FL (ref 78–100)
PLATELET # BLD AUTO: 204 10E9/L (ref 150–450)
POTASSIUM SERPL-SCNC: 4 MMOL/L (ref 3.4–5.3)
RBC # BLD AUTO: 4.2 10E12/L (ref 4.4–5.9)
SODIUM SERPL-SCNC: 138 MMOL/L (ref 133–144)
TACROLIMUS BLD-MCNC: 5.4 UG/L (ref 5–15)
TME LAST DOSE: 2120 H
WBC # BLD AUTO: 4.3 10E9/L (ref 4–11)

## 2021-05-11 PROCEDURE — 87799 DETECT AGENT NOS DNA QUANT: CPT | Performed by: INTERNAL MEDICINE

## 2021-05-11 PROCEDURE — 80197 ASSAY OF TACROLIMUS: CPT | Performed by: INTERNAL MEDICINE

## 2021-05-11 PROCEDURE — 85027 COMPLETE CBC AUTOMATED: CPT | Performed by: PATHOLOGY

## 2021-05-11 PROCEDURE — 36415 COLL VENOUS BLD VENIPUNCTURE: CPT | Performed by: PATHOLOGY

## 2021-05-11 PROCEDURE — 80048 BASIC METABOLIC PNL TOTAL CA: CPT | Performed by: PATHOLOGY

## 2021-05-20 DIAGNOSIS — Z11.59 ENCOUNTER FOR SCREENING FOR OTHER VIRAL DISEASES: ICD-10-CM

## 2021-06-01 ENCOUNTER — THERAPY VISIT (OUTPATIENT)
Dept: OCCUPATIONAL THERAPY | Facility: CLINIC | Age: 66
End: 2021-06-01
Attending: INTERNAL MEDICINE
Payer: MEDICARE

## 2021-06-01 DIAGNOSIS — M79.642 BILATERAL HAND PAIN: ICD-10-CM

## 2021-06-01 DIAGNOSIS — M25.641 STIFFNESS OF JOINTS OF BOTH HANDS: Primary | ICD-10-CM

## 2021-06-01 DIAGNOSIS — M25.649 FINGER STIFFNESS, UNSPECIFIED LATERALITY: ICD-10-CM

## 2021-06-01 DIAGNOSIS — M25.642 STIFFNESS OF JOINTS OF BOTH HANDS: Primary | ICD-10-CM

## 2021-06-01 DIAGNOSIS — M79.641 BILATERAL HAND PAIN: ICD-10-CM

## 2021-06-01 PROCEDURE — 97110 THERAPEUTIC EXERCISES: CPT | Mod: GO | Performed by: OCCUPATIONAL THERAPIST

## 2021-06-01 PROCEDURE — 97165 OT EVAL LOW COMPLEX 30 MIN: CPT | Mod: GO | Performed by: OCCUPATIONAL THERAPIST

## 2021-06-01 NOTE — LETTER
"DEPARTMENT OF HEALTH AND HUMAN SERVICES  [facility]ENT[facility] FOR MEDICARE & MEDICAID SERVICES    PLAN/UPDATED PLAN OF PROGRESS FOR OUTPATIENT REHABILITATION    PATIENTS NAME:  Murray Nicholson   : 1955    PROVIDER NUMBER:  [id number]    HICN: [id number]     PROVIDER NAME: [facility]    MEDICAL RECORD NUMBER: [id number]     START OF CARE DATE:    SOC Date: 21   TYPE:  OT    PRIMARY/TREATMENT DIAGNOSIS: (Pertinent Medical Diagnosis)   Finger stiffness, unspecified laterality  Bilateral hand pain  Stiffness of joints of both hands    VISITS FROM START OF CARE:  Rxs Used: 1     Hand Therapy Initial Evaluation  Current Date:  2021    Diagnosis: Bilateral hand stiffness (L>R)  DOI: A few years ago (Order date: 21)    Referring physician: Markell Clemons MD    Precautions: Heart and kidney transplant recipient    Subjective:  Murray Nicholson is a 66 year old male.  Patient reports symptoms of the bilateral hands which occurred following heart transplant surgery in 2018. Since onset symptoms are unchanged. Patient reports his hands are feeling stronger, but continue to feel stiff. He is unable to make a full fist.  General health as reported by patient is good.  Pertinent medical history includes: Heart transplant, kidney transplant, type II diabetes, sleep apnea.  Medical allergies: Drug allergies. See EMR.  Surgical history: heart: Heart transplant, other: Kidney transplant.  Medication history: See EMR.  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 Per patient, \"somewhat retired,\" on disability due to medical conditions  Job Tasks: Prolonged Sitting, Prolonged Standing, Repetitive Tasks    Occupational Profile Information:  Right hand dominant  Prior functional level:  no limitations  Patient reports symptoms of stiffness/loss of motion and weakness/loss of strength  Special tests:  Bilateral hand x-ray on 2019. See impression below:    Left:  No acute " osseous abnormality.  No erosion.  Mild scattered degenerative change. Cystic change at the third  metacarpal head. Osteopenic appearance.  Soft tissue is unremarkable.  PATIENTS NAME:  Murray Nicholson   :      Right:  No acute osseous abnormality.  No erosion.  Mild scattered degenerative change. Possible cystic change at the  lunate at the scaphoid interface. Osteopenic appearance.    Previous treatment: None. Has been working on hand strengthening independently  Barriers include: None  Mobility: No difficulty  Transportation: Drives  Leisure activities/hobbies: Trail walking  Other: Previous bilateral trigger thumbs and trigger thumb injections; reports difficulty opening jars and caps    Functional Outcome Measure:   Upper Extremity Functional Index Score:  SCORE: 50/80   (A lower score indicates greater disability.)    Objective:  Pain Level (Scale 0-10)   2021   At Rest 0/10   With Use 1-2/10     Pain Description  Date 2021   Location hand   Pain Quality Aching   Frequency intermittent     Pain is worst  daytime   Exacerbated by Stretching fingers   Relieved by heat   Progression Unchanged     Edema  None    Sensation   WNL throughout all nerve distributions; per patient report    ROM  Hand 2021   AROM(PROM) Left Right   Index MP -15/65 0/65   PIP 0/50 0/54   DIP 0/35 -15/50   KOROMA     Long MP 0/60 0/60   PIP 0/75 -30/75   DIP 0/45 0/59   KOROMA     Ring MP 0/55 0/60   PIP 0/62 0/75   DIP 0/35 0/53   KOROMA     Small MP 0/65 0/70   PIP 0/85 0/95   DIP 0/40 0/65   KOROMA     PATIENTS NAME:  Murray Nicholson   : 1955    Distal palmar crease measurements:  Right hand: 4-4.5 cm  Left hand: 5.5 cm    ROM  Thumb 2021   AROM  (PROM) Left Right   MP 0/50 0/45   IP 0/60 0/65   RABD 62 60   PABD 65 60   Kapandji Opposition Scale (0-10/10) Small finger MP joint Small finger MP joint     Strength   (Measured in pounds)  Pain Report: - none  + mild    ++ moderate    +++ severe     2021   Trials Left Right   1  2  3 14  10  10   16  16  13   Average 11.3 15     Lat Pinch 2021   Trials Left Right   1 12 14   Average 12 14     3 Pt Pinch 2021   Trials Left Right   1 8 11   Average 8 11     Observation  Scattered mild degenerative changes throughout bilateral hands.    Assessment:  Patient presents with symptoms consistent with diagnosis of the above condition,  with conservative intervention.     Patient's limitations or Problem List includes:  Pain, Decreased ROM/motion, Decreased  and Decreased pinch of the bilateral hand which interferes with the patient's ability to perform Self Care Tasks (dressing, eating, bathing, hygiene/toileting), Work Tasks, Recreational Activities, Household Chores and Driving  as compared to previous level of function.    Rehab Potential:  Good - Return to full activity, some limitations  PATIENTS NAME:  Murray Nicholson   : 1955    Patient will benefit from skilled Occupational Therapy to increase ROM,  strength and pinch strength and decrease pain to return to previous activity level and resume normal daily tasks and to reach their rehab potential.    Barriers to Learning:  No barrier    Communication Issues:  Patient appears to be able to clearly communicate and understand verbal and written communication and follow directions correctly.    Chart Review: Chart Review    Identified Performance Deficits: bathing/showering, toileting, dressing, feeding, hygiene and grooming, driving and community mobility, home establishment and management, meal preparation and cleanup, shopping, work and leisure activities    Assessment of Occupational Performance:  3-5 Performance Deficits    Clinical Decision Making (Complexity): Low complexity    Treatment Explanation:  The following has been discussed with the patient:  RX ordered/plan of care  Anticipated outcomes  Possible risks and side effects    Plan:  Frequency:  1 X  "week, once daily  Duration:  for 3 weeks tapering to 2 X a month over 8 weeks    Treatment Plan:   Modalities:  Paraffin  Therapeutic Exercise:  AROM, PROM, Tendon Gliding, Blocking, Isotonics and Isometrics  Manual Techniques:  Myofascial release  Orthotic Fabrication:  Static orthosis    Discharge Plan:  Achieve all LTG.  Independent in home treatment program.  Reach maximal therapeutic benefit.    Home Exercise Program:  Tendon gliding  Intrinsic stretching  Heat for stiffness    Next Visit:  Progress ROM      Caregiver Signature/Credentials ______________________________ Date ________       Treating Provider: RAY Morris, OTR/L    I have reviewed and certified the need for these services and plan of treatment while under my care.        PHYSICIAN'S SIGNATURE:   _________________________________________  Date___________    Markell Clemons MD    Certification period: Beginning of Cert date period: 21 End of Cert period date: 21   PATIENTS NAME:  Murray Nicholson   : 1955    Functional Level Progress Report: Please see attached \"Goal Flow sheet for Functional level.\"    ___X_____ Continue Services or       ________ DC Services                Service dates: SOC Date: 21  to present                                                                     "

## 2021-06-01 NOTE — PROGRESS NOTES
"Hand Therapy Initial Evaluation    Current Date:  6/1/2021    Diagnosis: Bilateral hand stiffness (L>R)  DOI: A few years ago (Order date: 5/17/21)    Referring physician: Markell Clemons MD    Precautions: Heart and kidney transplant recipient    Subjective:  Murray Nicholson is a 66 year old male.    Patient reports symptoms of the bilateral hands which occurred following heart transplant surgery in June 2018. Since onset symptoms are unchanged. Patient reports his hands are feeling stronger, but continue to feel stiff. He is unable to make a full fist.  General health as reported by patient is good.  Pertinent medical history includes: Heart transplant, kidney transplant, type II diabetes, sleep apnea.  Medical allergies: Drug allergies. See EMR.  Surgical history: heart: Heart transplant, other: Kidney transplant.  Medication history: See EMR.    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 See impression below:    Left:  No acute osseous abnormality.  No erosion.  Mild scattered degenerative change. Cystic change at the third  metacarpal head. Osteopenic appearance.  Soft tissue is unremarkable.     Right:  No acute osseous abnormality.  No erosion.  Mild scattered degenerative change. Possible cystic change at the  lunate at the scaphoid interface. Osteopenic appearance.    Previous treatment: None. Has been working on hand strengthening independently  Barriers include: None  Mobility: No difficulty  Transportation: Drives  Leisure activities/hobbies: Trail walking  Other: Previous bilateral trigger thumbs and trigger thumb " injections; reports difficulty opening jars and caps    Functional Outcome Measure:   Upper Extremity Functional Index Score:  SCORE: 50/80   (A lower score indicates greater disability.)        Objective:  Pain Level (Scale 0-10)   6/1/2021   At Rest 0/10   With Use 1-2/10     Pain Description  Date 6/1/2021   Location hand   Pain Quality Aching   Frequency intermittent     Pain is worst  daytime   Exacerbated by Stretching fingers   Relieved by heat   Progression Unchanged     Edema  None    Sensation   WNL throughout all nerve distributions; per patient report    ROM  Hand 6/1/2021 6/1/2021   AROM(PROM) Left Right   Index MP -15/65 0/65   PIP 0/50 0/54   DIP 0/35 -15/50   KOROMA     Long MP 0/60 0/60   PIP 0/75 -30/75   DIP 0/45 0/59   KOROMA     Ring MP 0/55 0/60   PIP 0/62 0/75   DIP 0/35 0/53   KOROMA     Small MP 0/65 0/70   PIP 0/85 0/95   DIP 0/40 0/65   KOROMA       Distal palmar crease measurements:  Right hand: 4-4.5 cm  Left hand: 5.5 cm    ROM  Thumb 6/1/2021 6/1/2021   AROM  (PROM) Left Right   MP 0/50 0/45   IP 0/60 0/65   RABD 62 60   PABD 65 60   Kapandji Opposition Scale (0-10/10) Small finger MP joint Small finger MP joint       Strength   (Measured in pounds)  Pain Report: - none  + mild    ++ moderate    +++ severe    6/1/2021 6/1/2021   Trials Left Right   1  2  3 14  10  10   16  16  13   Average 11.3 15     Lat Pinch 6/1/2021 6/1/2021   Trials Left Right   1 12 14   Average 12 14     3 Pt Pinch 6/1/2021 6/1/2021   Trials Left Right   1 8 11   Average 8 11         Observation  Scattered mild degenerative changes throughout bilateral hands.    Assessment:  Patient presents with symptoms consistent with diagnosis of the above condition,  with conservative intervention.     Patient's limitations or Problem List includes:  Pain, Decreased ROM/motion, Decreased  and Decreased pinch of the bilateral hand which interferes with the patient's ability to perform Self Care Tasks (dressing, eating, bathing,  hygiene/toileting), Work Tasks, Recreational Activities, Household Chores and Driving  as compared to previous level of function.    Rehab Potential:  Good - Return to full activity, some limitations    Patient will benefit from skilled Occupational Therapy to increase ROM,  strength and pinch strength and decrease pain to return to previous activity level and resume normal daily tasks and to reach their rehab potential.    Barriers to Learning:  No barrier    Communication Issues:  Patient appears to be able to clearly communicate and understand verbal and written communication and follow directions correctly.    Chart Review: Chart Review    Identified Performance Deficits: bathing/showering, toileting, dressing, feeding, hygiene and grooming, driving and community mobility, home establishment and management, meal preparation and cleanup, shopping, work and leisure activities    Assessment of Occupational Performance:  3-5 Performance Deficits    Clinical Decision Making (Complexity): Low complexity    Treatment Explanation:  The following has been discussed with the patient:  RX ordered/plan of care  Anticipated outcomes  Possible risks and side effects    Plan:  Frequency:  1 X week, once daily  Duration:  for 3 weeks tapering to 2 X a month over 8 weeks    Treatment Plan:   Modalities:  Paraffin  Therapeutic Exercise:  AROM, PROM, Tendon Gliding, Blocking, Isotonics and Isometrics  Manual Techniques:  Myofascial release  Orthotic Fabrication:  Static orthosis    Discharge Plan:  Achieve all LTG.  Independent in home treatment program.  Reach maximal therapeutic benefit.    Home Exercise Program:  Tendon gliding  Intrinsic stretching  Heat for stiffness    Next Visit:  Progress ROM

## 2021-06-02 DIAGNOSIS — E11.29 TYPE 2 DIABETES MELLITUS WITH OTHER DIABETIC KIDNEY COMPLICATION, WITHOUT LONG-TERM CURRENT USE OF INSULIN (H): ICD-10-CM

## 2021-06-03 ENCOUNTER — PRE VISIT (OUTPATIENT)
Dept: TRANSPLANT | Facility: CLINIC | Age: 66
End: 2021-06-03

## 2021-06-03 ENCOUNTER — TELEPHONE (OUTPATIENT)
Dept: TRANSPLANT | Facility: CLINIC | Age: 66
End: 2021-06-03

## 2021-06-03 ENCOUNTER — TELEPHONE (OUTPATIENT)
Dept: ENDOCRINOLOGY | Facility: CLINIC | Age: 66
End: 2021-06-03

## 2021-06-03 DIAGNOSIS — Z13.220 ENCOUNTER FOR LIPID SCREENING FOR CARDIOVASCULAR DISEASE: ICD-10-CM

## 2021-06-03 DIAGNOSIS — Z94.1 HEART REPLACED BY TRANSPLANT (H): Primary | ICD-10-CM

## 2021-06-03 DIAGNOSIS — Z79.899 ENCOUNTER FOR LONG-TERM (CURRENT) USE OF MEDICATIONS: ICD-10-CM

## 2021-06-03 DIAGNOSIS — Z13.6 ENCOUNTER FOR LIPID SCREENING FOR CARDIOVASCULAR DISEASE: ICD-10-CM

## 2021-06-03 DIAGNOSIS — E11.29 TYPE 2 DIABETES MELLITUS WITH OTHER DIABETIC KIDNEY COMPLICATION, WITHOUT LONG-TERM CURRENT USE OF INSULIN (H): Primary | ICD-10-CM

## 2021-06-03 DIAGNOSIS — Z12.5 PROSTATE CANCER SCREENING: ICD-10-CM

## 2021-06-03 RX ORDER — METFORMIN HCL 500 MG
2000 TABLET, EXTENDED RELEASE 24 HR ORAL
Qty: 360 TABLET | Refills: 3 | Status: SHIPPED | OUTPATIENT
Start: 2021-06-03 | End: 2022-06-06

## 2021-06-03 RX ORDER — SODIUM CHLORIDE 9 MG/ML
INJECTION, SOLUTION INTRAVENOUS CONTINUOUS
Status: CANCELLED | OUTPATIENT
Start: 2021-06-03

## 2021-06-03 RX ORDER — POTASSIUM CHLORIDE 1500 MG/1
40 TABLET, EXTENDED RELEASE ORAL
Status: CANCELLED | OUTPATIENT
Start: 2021-06-03

## 2021-06-03 RX ORDER — POTASSIUM CHLORIDE 1500 MG/1
20 TABLET, EXTENDED RELEASE ORAL
Status: CANCELLED | OUTPATIENT
Start: 2021-06-03

## 2021-06-03 NOTE — TELEPHONE ENCOUNTER
metFORMIN (GLUCOPHAGE-XR) 500 MG 24 hr tablet    Last Written Prescription Date:  12/13/2020  Last Fill Quantity: 360,   # refills: 1  Last Office Visit : 8/5/2020  Future Office visit:  8/23/2021    Routing refill request to provider for review/approval because:  Abnormal and needing an updated A1C on file.  Refer to Provider for review   Recent Labs   Lab Test 09/14/20  0933   A1C 6.1*         Lauren Huynh RN  Central Triage Red Flags/Med Refills

## 2021-06-03 NOTE — TELEPHONE ENCOUNTER
Health Call Center    Phone Message    May a detailed message be left on voicemail: yes     Reason for Call: Medication Question or concern regarding medication   Prescription Clarification  Name of Medication: metformin  Prescribing Provider: [facility]   Pharmacy: [id number] - [place], [place] - [address] AT [facility]   What on the order needs clarification? Per Lynne, pt has Chronic Renal Failure listed in file.  If so, Lynne needs eGFR due to dosing.  Please review to clarify and call Lynne back.      [phone]        Action Taken: Message routed to:  [facility] ([facility] endo    Travel Screening: Not Applicable

## 2021-06-03 NOTE — TELEPHONE ENCOUNTER
Pt called to review upcoming appts.  Noted that pt needs an MRI due to enlarged aorta, ECHO cancelled. Pt will schedule at his convenience.     Pt reports doing well - building endurance.     Confirmed needs COVID test before Monday's procedure.

## 2021-06-04 ENCOUNTER — TELEPHONE (OUTPATIENT)
Dept: CARDIOLOGY | Facility: CLINIC | Age: 66
End: 2021-06-04

## 2021-06-04 DIAGNOSIS — Z11.59 ENCOUNTER FOR SCREENING FOR OTHER VIRAL DISEASES: ICD-10-CM

## 2021-06-04 LAB
LABORATORY COMMENT REPORT: NORMAL
SARS-COV-2 RNA RESP QL NAA+PROBE: NEGATIVE
SARS-COV-2 RNA RESP QL NAA+PROBE: NORMAL
SPECIMEN SOURCE: NORMAL
SPECIMEN SOURCE: NORMAL

## 2021-06-04 PROCEDURE — U0005 INFEC AGEN DETEC AMPLI PROBE: HCPCS | Performed by: INTERNAL MEDICINE

## 2021-06-04 PROCEDURE — U0003 INFECTIOUS AGENT DETECTION BY NUCLEIC ACID (DNA OR RNA); SEVERE ACUTE RESPIRATORY SYNDROME CORONAVIRUS 2 (SARS-COV-2) (CORONAVIRUS DISEASE [COVID-19]), AMPLIFIED PROBE TECHNIQUE, MAKING USE OF HIGH THROUGHPUT TECHNOLOGIES AS DESCRIBED BY CMS-2020-01-R: HCPCS | Performed by: INTERNAL MEDICINE

## 2021-06-04 NOTE — TELEPHONE ENCOUNTER
Call complete for pre procedure reminder, travel screen and updated visitor policy.  COVID test performed today, 6/4/2021; results are still pending.

## 2021-06-07 ENCOUNTER — RESULTS ONLY (OUTPATIENT)
Dept: OTHER | Facility: CLINIC | Age: 66
End: 2021-06-07

## 2021-06-07 ENCOUNTER — HOSPITAL ENCOUNTER (OUTPATIENT)
Facility: CLINIC | Age: 66
Discharge: HOME | End: 2021-06-07
Attending: INTERNAL MEDICINE | Admitting: INTERNAL MEDICINE
Payer: MEDICARE

## 2021-06-07 ENCOUNTER — APPOINTMENT (OUTPATIENT)
Dept: MEDSURG UNIT | Facility: CLINIC | Age: 66
End: 2021-06-07
Attending: INTERNAL MEDICINE
Payer: MEDICARE

## 2021-06-07 ENCOUNTER — APPOINTMENT (OUTPATIENT)
Dept: LAB | Facility: CLINIC | Age: 66
End: 2021-06-07
Attending: INTERNAL MEDICINE
Payer: MEDICARE

## 2021-06-07 VITALS
OXYGEN SATURATION: 86 % | WEIGHT: 183 LBS | SYSTOLIC BLOOD PRESSURE: 144 MMHG | BODY MASS INDEX: 27.42 KG/M2 | TEMPERATURE: 97.8 F | HEART RATE: 73 BPM | RESPIRATION RATE: 16 BRPM | DIASTOLIC BLOOD PRESSURE: 87 MMHG

## 2021-06-07 DIAGNOSIS — Z13.6 ENCOUNTER FOR LIPID SCREENING FOR CARDIOVASCULAR DISEASE: ICD-10-CM

## 2021-06-07 DIAGNOSIS — Z79.899 ENCOUNTER FOR LONG-TERM (CURRENT) USE OF MEDICATIONS: ICD-10-CM

## 2021-06-07 DIAGNOSIS — Z12.5 PROSTATE CANCER SCREENING: ICD-10-CM

## 2021-06-07 DIAGNOSIS — Z94.1 HEART REPLACED BY TRANSPLANT (H): ICD-10-CM

## 2021-06-07 DIAGNOSIS — Z13.220 ENCOUNTER FOR LIPID SCREENING FOR CARDIOVASCULAR DISEASE: ICD-10-CM

## 2021-06-07 DIAGNOSIS — Z98.890 S/P CORONARY ANGIOGRAM: Primary | ICD-10-CM

## 2021-06-07 LAB
ALBUMIN SERPL-MCNC: 3.8 G/DL (ref 3.4–5)
ALP SERPL-CCNC: 101 U/L (ref 40–150)
ALT SERPL W P-5'-P-CCNC: 28 U/L (ref 0–70)
ANION GAP SERPL CALCULATED.3IONS-SCNC: 3 MMOL/L (ref 3–14)
AST SERPL W P-5'-P-CCNC: 27 U/L (ref 0–45)
BILIRUB SERPL-MCNC: 1.6 MG/DL (ref 0.2–1.3)
BUN SERPL-MCNC: 14 MG/DL (ref 7–30)
CALCIUM SERPL-MCNC: 9.4 MG/DL (ref 8.5–10.1)
CHLORIDE SERPL-SCNC: 106 MMOL/L (ref 94–109)
CHOLEST SERPL-MCNC: 120 MG/DL
CK SERPL-CCNC: 147 U/L (ref 30–300)
CMV DNA SPEC NAA+PROBE-ACNC: NORMAL [IU]/ML
CMV DNA SPEC NAA+PROBE-LOG#: NORMAL {LOG_IU}/ML
CO2 SERPL-SCNC: 30 MMOL/L (ref 20–32)
CREAT SERPL-MCNC: 1.04 MG/DL (ref 0.66–1.25)
ERYTHROCYTE [DISTWIDTH] IN BLOOD BY AUTOMATED COUNT: 13.2 % (ref 10–15)
GFR SERPL CREATININE-BSD FRML MDRD: 74 ML/MIN/{1.73_M2}
GLUCOSE BLDC GLUCOMTR-MCNC: 138 MG/DL (ref 70–99)
GLUCOSE SERPL-MCNC: 125 MG/DL (ref 70–99)
HBA1C MFR BLD: 6.2 % (ref 0–5.6)
HCT VFR BLD AUTO: 40.1 % (ref 40–53)
HDLC SERPL-MCNC: 49 MG/DL
HGB BLD-MCNC: 12.1 G/DL (ref 13.3–17.7)
HGB BLD-MCNC: 12.9 G/DL (ref 13.3–17.7)
HGB BLD-MCNC: 12.9 G/DL (ref 13.3–17.7)
LDLC SERPL CALC-MCNC: 28 MG/DL
MAGNESIUM SERPL-MCNC: 1.3 MG/DL (ref 1.6–2.3)
MCH RBC QN AUTO: 29.7 PG (ref 26.5–33)
MCHC RBC AUTO-ENTMCNC: 32.2 G/DL (ref 31.5–36.5)
MCV RBC AUTO: 92 FL (ref 78–100)
NONHDLC SERPL-MCNC: 71 MG/DL
OXYHGB MFR BLDV: 80 %
OXYHGB MFR BLDV: 96 %
PHOSPHATE SERPL-MCNC: 3.1 MG/DL (ref 2.5–4.5)
PLATELET # BLD AUTO: 189 10E9/L (ref 150–450)
POTASSIUM SERPL-SCNC: 4.2 MMOL/L (ref 3.4–5.3)
PROT SERPL-MCNC: 7.3 G/DL (ref 6.8–8.8)
PSA SERPL-ACNC: 2.5 UG/L (ref 0–4)
RBC # BLD AUTO: 4.35 10E12/L (ref 4.4–5.9)
SODIUM SERPL-SCNC: 139 MMOL/L (ref 133–144)
SPECIMEN SOURCE: NORMAL
TACROLIMUS BLD-MCNC: 11.8 UG/L (ref 5–15)
TME LAST DOSE: NORMAL H
TRIGL SERPL-MCNC: 215 MG/DL
WBC # BLD AUTO: 4.2 10E9/L (ref 4–11)

## 2021-06-07 PROCEDURE — 999N000127 HC STATISTIC PERIPHERAL IV START W US GUIDANCE

## 2021-06-07 PROCEDURE — 86352 CELL FUNCTION ASSAY W/STIM: CPT | Performed by: INTERNAL MEDICINE

## 2021-06-07 PROCEDURE — 86833 HLA CLASS II HIGH DEFIN QUAL: CPT | Performed by: INTERNAL MEDICINE

## 2021-06-07 PROCEDURE — 83036 HEMOGLOBIN GLYCOSYLATED A1C: CPT | Performed by: INTERNAL MEDICINE

## 2021-06-07 PROCEDURE — C1894 INTRO/SHEATH, NON-LASER: HCPCS | Performed by: INTERNAL MEDICINE

## 2021-06-07 PROCEDURE — 83735 ASSAY OF MAGNESIUM: CPT | Performed by: INTERNAL MEDICINE

## 2021-06-07 PROCEDURE — 84100 ASSAY OF PHOSPHORUS: CPT | Performed by: INTERNAL MEDICINE

## 2021-06-07 PROCEDURE — 86832 HLA CLASS I HIGH DEFIN QUAL: CPT | Performed by: INTERNAL MEDICINE

## 2021-06-07 PROCEDURE — 93456 R HRT CORONARY ARTERY ANGIO: CPT | Performed by: INTERNAL MEDICINE

## 2021-06-07 PROCEDURE — 85018 HEMOGLOBIN: CPT

## 2021-06-07 PROCEDURE — 87799 DETECT AGENT NOS DNA QUANT: CPT | Performed by: INTERNAL MEDICINE

## 2021-06-07 PROCEDURE — 80197 ASSAY OF TACROLIMUS: CPT | Performed by: INTERNAL MEDICINE

## 2021-06-07 PROCEDURE — 250N000011 HC RX IP 250 OP 636: Performed by: PHYSICIAN ASSISTANT

## 2021-06-07 PROCEDURE — 999N000054 HC STATISTIC EKG NON-CHARGEABLE

## 2021-06-07 PROCEDURE — 99152 MOD SED SAME PHYS/QHP 5/>YRS: CPT | Performed by: INTERNAL MEDICINE

## 2021-06-07 PROCEDURE — 258N000003 HC RX IP 258 OP 636: Performed by: INTERNAL MEDICINE

## 2021-06-07 PROCEDURE — 93010 ELECTROCARDIOGRAM REPORT: CPT | Mod: 59 | Performed by: INTERNAL MEDICINE

## 2021-06-07 PROCEDURE — 82810 BLOOD GASES O2 SAT ONLY: CPT

## 2021-06-07 PROCEDURE — 99214 OFFICE O/P EST MOD 30 MIN: CPT | Mod: 24 | Performed by: PHYSICIAN ASSISTANT

## 2021-06-07 PROCEDURE — 250N000011 HC RX IP 250 OP 636: Performed by: INTERNAL MEDICINE

## 2021-06-07 PROCEDURE — 85027 COMPLETE CBC AUTOMATED: CPT | Performed by: INTERNAL MEDICINE

## 2021-06-07 PROCEDURE — 36415 COLL VENOUS BLD VENIPUNCTURE: CPT | Performed by: INTERNAL MEDICINE

## 2021-06-07 PROCEDURE — 82962 GLUCOSE BLOOD TEST: CPT

## 2021-06-07 PROCEDURE — 80061 LIPID PANEL: CPT | Performed by: INTERNAL MEDICINE

## 2021-06-07 PROCEDURE — 99153 MOD SED SAME PHYS/QHP EA: CPT | Performed by: INTERNAL MEDICINE

## 2021-06-07 PROCEDURE — 82550 ASSAY OF CK (CPK): CPT | Performed by: INTERNAL MEDICINE

## 2021-06-07 PROCEDURE — G0103 PSA SCREENING: HCPCS | Performed by: INTERNAL MEDICINE

## 2021-06-07 PROCEDURE — 272N000001 HC OR GENERAL SUPPLY STERILE: Performed by: INTERNAL MEDICINE

## 2021-06-07 PROCEDURE — 999N000142 HC STATISTIC PROCEDURE PREP ONLY

## 2021-06-07 PROCEDURE — 250N000013 HC RX MED GY IP 250 OP 250 PS 637: Performed by: INTERNAL MEDICINE

## 2021-06-07 PROCEDURE — 80053 COMPREHEN METABOLIC PANEL: CPT | Performed by: INTERNAL MEDICINE

## 2021-06-07 PROCEDURE — 250N000009 HC RX 250: Performed by: INTERNAL MEDICINE

## 2021-06-07 RX ORDER — IOPAMIDOL 755 MG/ML
INJECTION, SOLUTION INTRAVASCULAR
Status: DISCONTINUED | OUTPATIENT
Start: 2021-06-07 | End: 2021-06-07 | Stop reason: HOSPADM

## 2021-06-07 RX ORDER — MULTIVIT WITH MINERALS/LUTEIN
1 TABLET ORAL EVERY MORNING
COMMUNITY
End: 2023-06-23

## 2021-06-07 RX ORDER — POTASSIUM CHLORIDE 750 MG/1
40 TABLET, EXTENDED RELEASE ORAL
Status: DISCONTINUED | OUTPATIENT
Start: 2021-06-07 | End: 2021-06-07 | Stop reason: HOSPADM

## 2021-06-07 RX ORDER — SODIUM CHLORIDE 9 MG/ML
INJECTION, SOLUTION INTRAVENOUS CONTINUOUS
Status: DISCONTINUED | OUTPATIENT
Start: 2021-06-07 | End: 2021-06-07 | Stop reason: HOSPADM

## 2021-06-07 RX ORDER — POTASSIUM CHLORIDE 750 MG/1
20 TABLET, EXTENDED RELEASE ORAL
Status: DISCONTINUED | OUTPATIENT
Start: 2021-06-07 | End: 2021-06-07 | Stop reason: HOSPADM

## 2021-06-07 RX ORDER — MAGNESIUM SULFATE HEPTAHYDRATE 40 MG/ML
4 INJECTION, SOLUTION INTRAVENOUS ONCE
Status: COMPLETED | OUTPATIENT
Start: 2021-06-07 | End: 2021-06-07

## 2021-06-07 RX ORDER — NALOXONE HYDROCHLORIDE 0.4 MG/ML
0.4 INJECTION, SOLUTION INTRAMUSCULAR; INTRAVENOUS; SUBCUTANEOUS
Status: DISCONTINUED | OUTPATIENT
Start: 2021-06-07 | End: 2021-06-07

## 2021-06-07 RX ORDER — NALOXONE HYDROCHLORIDE 0.4 MG/ML
0.2 INJECTION, SOLUTION INTRAMUSCULAR; INTRAVENOUS; SUBCUTANEOUS
Status: DISCONTINUED | OUTPATIENT
Start: 2021-06-07 | End: 2021-06-07 | Stop reason: HOSPADM

## 2021-06-07 RX ORDER — NITROGLYCERIN 5 MG/ML
VIAL (ML) INTRAVENOUS
Status: DISCONTINUED | OUTPATIENT
Start: 2021-06-07 | End: 2021-06-07 | Stop reason: HOSPADM

## 2021-06-07 RX ORDER — FENTANYL CITRATE 50 UG/ML
25-50 INJECTION, SOLUTION INTRAMUSCULAR; INTRAVENOUS
Status: ACTIVE | OUTPATIENT
Start: 2021-06-07 | End: 2021-06-07

## 2021-06-07 RX ORDER — NALOXONE HYDROCHLORIDE 0.4 MG/ML
0.4 INJECTION, SOLUTION INTRAMUSCULAR; INTRAVENOUS; SUBCUTANEOUS
Status: DISCONTINUED | OUTPATIENT
Start: 2021-06-07 | End: 2021-06-07 | Stop reason: HOSPADM

## 2021-06-07 RX ORDER — FENTANYL CITRATE 50 UG/ML
INJECTION, SOLUTION INTRAMUSCULAR; INTRAVENOUS
Status: DISCONTINUED | OUTPATIENT
Start: 2021-06-07 | End: 2021-06-07 | Stop reason: HOSPADM

## 2021-06-07 RX ORDER — FLUMAZENIL 0.1 MG/ML
0.2 INJECTION, SOLUTION INTRAVENOUS
Status: DISCONTINUED | OUTPATIENT
Start: 2021-06-07 | End: 2021-06-07 | Stop reason: HOSPADM

## 2021-06-07 RX ORDER — HYDRALAZINE HYDROCHLORIDE 20 MG/ML
INJECTION INTRAMUSCULAR; INTRAVENOUS
Status: DISCONTINUED | OUTPATIENT
Start: 2021-06-07 | End: 2021-06-07 | Stop reason: HOSPADM

## 2021-06-07 RX ORDER — NALOXONE HYDROCHLORIDE 0.4 MG/ML
0.2 INJECTION, SOLUTION INTRAMUSCULAR; INTRAVENOUS; SUBCUTANEOUS
Status: DISCONTINUED | OUTPATIENT
Start: 2021-06-07 | End: 2021-06-07

## 2021-06-07 RX ORDER — ATROPINE SULFATE 0.1 MG/ML
0.5 INJECTION INTRAVENOUS
Status: DISCONTINUED | OUTPATIENT
Start: 2021-06-07 | End: 2021-06-07 | Stop reason: HOSPADM

## 2021-06-07 RX ORDER — LIDOCAINE 40 MG/G
CREAM TOPICAL
Status: DISCONTINUED | OUTPATIENT
Start: 2021-06-07 | End: 2021-06-07 | Stop reason: HOSPADM

## 2021-06-07 RX ADMIN — SODIUM CHLORIDE: 9 INJECTION, SOLUTION INTRAVENOUS at 09:58

## 2021-06-07 RX ADMIN — MAGNESIUM SULFATE HEPTAHYDRATE 2 G: 40 INJECTION, SOLUTION INTRAVENOUS at 11:35

## 2021-06-07 RX ADMIN — ASPIRIN 325 MG ORAL TABLET 325 MG: 325 PILL ORAL at 09:23

## 2021-06-07 RX ADMIN — MAGNESIUM SULFATE HEPTAHYDRATE 2 G: 40 INJECTION, SOLUTION INTRAVENOUS at 10:27

## 2021-06-07 NOTE — Clinical Note
LIJ sheath removed, manual pressure held to hemostasis. Band-Aid. LFV sheath removed, manual pressure held to hemostasis

## 2021-06-07 NOTE — DISCHARGE INSTRUCTIONS
Going Home after an Angioplasty or Stent Placement (Cardiac)  ______________________________________________      After you go home:    Have an adult stay with you for 24 hours.    Drink plenty of fluids.    You may eat your normal diet, unless your doctor tells you otherwise.    For 24 hours:    Relax and take it easy.    Do NOT smoke.    Do NOT make any important or legal decisions.    Do NOT drive or operate machines at home or at work.    Do NOT drink alcohol.    Remove the Band-Aid after 24 hours. If there is minor oozing, apply another Band-aid and remove it after 12 hours.    For 2 days, do NOT have sex or do any heavy exercise.    Do NOT take a bath, or use a hot tub or pool for at least 3 days. You may shower.    Care of groin site  It is normal to have a small bruise or lump at the site.    Do not scrub the site.    For the first 2 days: Do not stoop or squat. When you cough, sneeze or move your bowels, hold your hand over the puncture site and press gently.    Do not lift more than 10 pounds for at least 3 to 5 days.    Do not use lotion or powder near the puncture site for 3 days.    If you start bleeding from the site in your groin, lie down flat and press firmly  on the site. Call your doctor as soon as you can.    Care of wrist or arm site  It is normal to have soreness at the puncture site and mild tingling in your hand for up to 3 days.    For 2 days, do not use your hand or arm to support your weight (such as rising from a chair) or bend your wrist (such as lifting a garage door).    For 2 days, do not lift more than 5 pounds or exercise your arm (tennis, golf or bowling).    If you start bleeding from the site in your arm:    Sit down and press firmly on the site with your fingers for 10 minutes. Call your doctor as soon as you can.    If the bleeding stops, sit still and keep your wrist straight for 2 hours.    Medicines    If you have started taking Plavix or Effient, do not stop taking it  until you talk to your heart doctor (cardiologist).    If you are on metformin (Glucophage), do not restart it until you have blood tests (within 2 to 3 days after discharge). When your doctor tells you it is safe, you may restart the metformin.    If you have stopped any other medicines, check with your nurse or provider about when to restart them.    Call 911 right away if you have bleeding that is heavy or does not stop.    Call your doctor if:    You have a large or growing hard lump around the site.    The site is red, swollen, hot or tender.    Blood or fluid is draining from the site.    You have chills or a fever greater than 101 F (38 C).    Your leg or arm feels numb or cool.    You have hives, a rash or unusual itching.      [facility] [place]:  [phone] (7 days a week)

## 2021-06-07 NOTE — PROGRESS NOTES
D/I/A: Pt roomed on 3C in bay [address].  Arrived via litter and accompanied by CL RN. On/Off: Off monitor.  VSSA.  Rhythm upon arrival SR on monitor.  Denies pain or sob.  Reviewed activity restrictions and when to notify RN, ie-changes to breathing or increased chest pressure or chest pain.  CCL access:  L groin and L IJ drsg WNL. CMS intact.  P: Continue to monitor status.  Discharge to home once meeting criteria.

## 2021-06-07 NOTE — PROGRESS NOTES
D/I/A:  Patient is tolerating liquids and foods, ambulating, urinating, puncture sites are stable ( no bleeding and no hematoma) and patient has a .  A+O x4 and making needs known.  CCL access sites C/D/I; no bleeding or hematoma; CMS intact.  VSSA. SR on monitor.  IV access removed.  Education completed and outlined in AVS or handout: medications reviewed with patient.  Questions answered prior to discharge.  Belongings returned to patient at discharge.    P: Discharged to self care.  Patient to follow up with appts as per discharge instruction.

## 2021-06-07 NOTE — H&P
History and Physical: Cardiology Cath Lab Service    Murray Nicholson MRN# [id number]   YOB: 1955 Age: 66 year old         Assessment and plan:   Murray Nicholson is a 65 yo male with a history of NICM s/p OHT 6/14/18, HTN, HL, DMII, and ESRD (on hemodialysis since fall 2017, now s/p renal transplant 12/2019) who presents for routine surveillance coronary angiogram and right heart catheterization.     # s/p OHT  # HTN  # HLD  # DM2  # Hx of renal transplant  - No contraindications noted, will move forward with coronary angiogram and right heart catheterization  - Patient will be admitted as OP to Obs unit post procedure, with plans to discharge home after post procedure orders have been met  - follow up with Dr. Ernst as scheduled.     Patient seen and discussed with Dr. Ramy Melgoza PA-C  [facility] Cardiology Cath Lab Services  [phone]        HPI:   Murray Nicholson is a 65 yo male with a history of NICM s/p OHT 6/14/18, HTN, HL, DMII, and ESRD (on hemodialysis since fall 2017, now s/p renal transplant 12/2019) who presents for routine surveillance coronary angiogram and right heart catheterization.      Last coronary angiogram on 7/15/2020 revealed single vessel obstructive disease with 80% stenosis of the OM2 which was unchanged from prior angiogram, thus no intervention was performed. Proximal LAD with 40% stenosis and RCA with 20-40% stenosis.     Patient reports feeling well today, denies chest pain, shortness of breath, lower extremity edema, palpitation, presyncope, or syncope. No recent illness, abdominal pain, n/v/d.     Past Medical History:   Diagnosis Date     (HFpEF) heart failure with preserved ejection fraction (H)      Allergic rhinitis, cause unspecified      Anemia of chronic kidney failure      AS (aortic stenosis)      Ascending aortic aneurysm (H)      Bicuspid aortic valve      CAD (coronary artery disease)      Congestive heart failure, unspecified      Dialysis  patient (H)     Tues-Thur-Sat     Dyslipidemia      Esophageal reflux      ESRD (end stage renal disease) (H)      Hearing problem      Heart replaced by transplant (H) 12/10/2018     Hypersomnia with sleep apnea, unspecified      Hypertension      Ileostomy status (H)      Immunosuppression (H)      MGUS (monoclonal gammopathy of unknown significance)      Mitral regurgitation      SHEELA (obstructive sleep apnea)     No CPAP     Pneumonia      Systolic heart failure (H)      Type 2 diabetes mellitus (H)        Past Surgical History:   Procedure Laterality Date     CARDIAC SURGERY       COLONOSCOPY N/A 5/3/2018    Procedure: COLONOSCOPY;  colonoscopy ;  Surgeon: Ammon Castillo MD;  Location: [facility]     CREATE FISTULA ARTERIOVENOUS UPPER EXTREMITY BOVINE Left 5/8/2019    Procedure: Left Upper Extremity Arteriovenous Bovine Graft Creation;  Surgeon: Calin Cheney MD;  [facility]     CV CORONARY ANGIOGRAM N/A 6/28/2019    Procedure: CV CORONARY ANGIOGRAM;  Surgeon: Montrell Posada MD;  [facility]     CV CORONARY ANGIOGRAM N/A 7/15/2020    Procedure: CV CORONARY ANGIOGRAM;  Surgeon: Marcelo Ramírez MD;  [facility]     CV HEART BIOPSY N/A 2/1/2019    Procedure: HBX;  Surgeon: Montrell Posada MD;  [facility]     CV HEART BIOPSY N/A 3/22/2019    Procedure: HBX, RIJV ACCESS;  Surgeon: Jordan Fox MD;  [facility]     CV HEART BIOPSY N/A 6/28/2019    Procedure: CV HEART BIOPSY;  Surgeon: Montrell Posada MD;  [facility]     CV HEART BIOPSY N/A 10/28/2019    Procedure: CV HEART BIOPSY;  Surgeon: Marcelo Ramírez MD;  [facility]     CV HEART BIOPSY N/A 2/6/2020    Procedure: CV HEART BIOPSY;  Surgeon: Montrell Posada MD;  [facility]     CV HEART BIOPSY N/A 7/15/2020    Procedure: CV HEART BIOPSY;  Surgeon:  Marcelo Ramírez MD;  [facility]     CV RIGHT HEART CATH MEASUREMENTS RECORDED N/A 6/28/2019    Procedure: CV RIGHT HEART CATH;  Surgeon: Montrell Posada MD;  [facility]     CV RIGHT HEART CATH MEASUREMENTS RECORDED N/A 7/15/2020    Procedure: CV RIGHT HEART CATH;  Surgeon: Marcelo Ramírez MD;  Location:  HEART CARDIAC CATH LAB     ESOPHAGOSCOPY, GASTROSCOPY, DUODENOSCOPY (EGD), COMBINED N/A 5/7/2018    Procedure: COMBINED ENDOSCOPIC ULTRASOUND, ESOPHAGOSCOPY, GASTROSCOPY, DUODENOSCOPY (EGD), FINE NEEDLE ASPIRATE/BIOPSY;  Endoscopic Ultrasound with Fine Needle Aspiration ;  Surgeon: Alon Don MD;  [facility]     EXAM UNDER ANESTHESIA RECTUM N/A 8/12/2018    Procedure: EXAM UNDER ANESTHESIA RECTUM;  EXAM UNDER ANESTHESIA RECTUM ,COMBINED INCISION AND DRAINAGE OF RECTAL ABCESS ;  Surgeon: Rick Tran MD;  [facility]     INCISION AND DRAINAGE RECTUM, COMBINED N/A 8/12/2018    Procedure: COMBINED INCISION AND DRAINAGE RECTUM;;  Surgeon: Rick Tran MD;  [facility]     IR DIALYSIS FISTULOGRAM LEFT  9/25/2019     IR DIALYSIS FISTULOGRAM LEFT  11/22/2019     IR DIALYSIS PTA  9/25/2019     IR DIALYSIS PTA  11/22/2019     LAPAROSCOPIC ASSISTED COLOSTOMY TAKEDOWN N/A 12/11/2018    Procedure: Laparoscopic Assisted Colostomy Takedown, Laparoscopic Lysis of Adhesions;  Surgeon: Rick Tran MD;  [facility]     LAPAROSCOPIC ASSISTED SIGMOID COLECTOMY N/A 8/14/2018    Procedure: LAPAROSCOPIC ASSISTED SIGMOID COLECTOMY;  Laparoscopic Hand Assisted Takedown of Splenic Flexure, Sigmoidectomy, Small Bowel Resection, Takedown of Small Bowel to Colon Fistula;  Surgeon: Rick Tran MD;  [facility]     LAPAROSCOPIC HERNIORRHAPHY INGUINAL BILATERAL Bilateral 7/24/2015    Procedure: LAPAROSCOPIC HERNIORRHAPHY INGUINAL BILATERAL;  Surgeon: Bobby Mcconnell MD;  [facility]      LAPAROSCOPIC INSERTION CATHETER PERITONEAL DIALYSIS N/A 6/22/2017    Procedure: LAPAROSCOPIC INSERTION CATHETER PERITONEAL DIALYSIS;  Laparoscopic Peritoneal Dialysis Catheter Placement - Anesthesia with block;  Surgeon: Esteban Arvizu MD;  [facility]     PICC INSERTION Left 04/22/2018    5Fr - 49cm (3cm external), Basilic vein, low SVC     REMOVE CATHETER PERITONEAL Right 1/15/2018    Procedure: REMOVE CATHETER PERITONEAL;  Open Removal of Peritoneal Dialysis Catheter ;  Surgeon: Esteban Arvizu MD;  [facility]     SIGMOIDOSCOPY FLEXIBLE N/A 11/21/2018    Procedure: Examination Under Anesthesia, Flexible Sigmoidoscopy and Polypectomy;  Surgeon: Rick Tran MD;  [facility]     SIGMOIDOSCOPY FLEXIBLE N/A 12/11/2018    Procedure: Flexible Sigmoidoscopy;  Surgeon: Rick Tran MD;  [facility]     TAKEDOWN ILEOSTOMY N/A 3/27/2019    Procedure: Takedown Ileostomy;  Surgeon: Rick Tran MD;  [facility]     TRANSPLANT HEART RECIPIENT N/A 6/14/2018    Procedure: TRANSPLANT HEART RECIPIENT;  Median Sternotomy, on-pump oxygenator, Heart Transplant;  Surgeon: Rony Caputo MD;  [facility]     TRANSPLANT KIDNEY RECIPIENT LIVING UNRELATED  12/2019       diatrizoate meglumine-sodium (GASTROGRAFIN/GASTROVIEW) 66-10 % solution 480 mL    aspirin 81 MG EC tablet, Take 81 mg by mouth daily  atorvastatin (LIPITOR) 40 MG tablet, Take 1 tablet (40 mg) by mouth daily  biotin 1000 MCG TABS tablet, Take 1,000 mcg by mouth daily Patient takes every other day  calcium citrate and vitamin D (CITRACAL) 200-250 MG-UNIT TABS per tablet, Take 1 tablet by mouth 2 times daily  Lactobacillus (ACIDOPHILUS PO), 4 billion active cultures  loratadine (CLARITIN) 10 MG tablet, Take 10 mg by mouth daily Patient takes at bedtime  multivitamin (CENTRUM SILVER) tablet, Take 1 tablet by mouth daily  pantoprazole (PROTONIX) 20 MG EC tablet, Take 1 tablet (20 mg) by mouth every  other day  sulfamethoxazole-trimethoprim (BACTRIM) 400-80 MG tablet, Take 1 tablet by mouth daily  tacrolimus (GENERIC EQUIVALENT) 0.5 MG capsule, Take ONE cap every PM (0.5 mg every evening)  tacrolimus (GENERIC EQUIVALENT) 1 MG capsule, Take ONE cap every AM (1 mg every morning)  Zinc Sulfate (ZINC 15 PO),   acetaminophen (TYLENOL) 325 MG tablet, Take 2 tablets (650 mg) by mouth every 4 hours as needed for other (multimodal surgical pain management along with NSAIDS and opioid medication as indicated based on pain control and physical function.)  blood glucose (ACCU-CHEK GUIDE) test strip, Use to test blood sugar 3 times daily or as directed.  blood glucose monitoring (ACCU-CHEK FASTCLIX) lancets, USE TO TEST 3 TIMES DAILY OR AS DIRECTED.  fluticasone (FLONASE) 50 MCG/ACT nasal spray, SHAKE LIQUID AND USE 1 SPRAY IN EACH NOSTRIL DAILY  sodium bicarbonate 650 MG tablet, Take 2 tablets (1,300 mg) by mouth daily        Family History   Problem Relation Age of Onset     C.A.D. Father          from-never knew father-age 60     Diabetes Father      Cerebrovascular Disease Father      Hypertension Father      Hypertension No family hx of      Breast Cancer No family hx of      Cancer - colorectal No family hx of      Prostate Cancer No family hx of      Kidney Disease No family hx of      Melanoma No family hx of      Skin Cancer No family hx of        Social History     Tobacco Use     Smoking status: Former Smoker     Packs/day: 1.00     Years: 20.00     Pack years: 20.00     Types: Cigarettes     Start date: 1984     Quit date: 1994     Years since quittin.8     Smokeless tobacco: Never Used   Substance Use Topics     Alcohol use: No     Alcohol/week: 0.0 standard drinks       Allergies   Allergen Reactions     Norco [Hydrocodone-Acetaminophen] Nausea and Vomiting     Cats      Throat tightness     Isosorbide Other (See Comments)     hypotension     Penicillins Hives     Seasonal Allergies       rhinitis     Shrimp      Throat closes          ROS:   All systems reviewed and negative.     Physical Examination:  Vitals: BP (!) 178/99 (BP Location: Right arm)   Pulse 75   Temp 97.8  F (36.6  C) (Oral)   Resp 16   Wt 83 kg (183 lb)   SpO2 100%   BMI 27.42 kg/m    BMI= Body mass index is 27.42 kg/m .    GENERAL APPEARANCE: Pleasant and well appearing gentleman. Appears comfortable and in no acute distress. Alert and interactive.   HEENT: NCAT. No icterus, no xanthelasmas. Oral mucosa moist.   NECK: JVP not elevated  CHEST: Normal work of breathing. lungs clear to auscultation without rales, rhonchi or wheezes, no use of accessory muscles, no retractions  CARDIOVASCULAR: regular rhythm, normal S1 and S2, no S3 or S4 and no murmur, click or rub. Radial and pedal pulses 2+ bilaterally.  ABDOMEN: soft, non tender  EXTREMITIES: warm, trace bilateral lower extremity edema, no cyanosis or clubbing.   NEURO: alert and oriented to person/place/time, normal speech  PSYCH: Mood and affect are appropriate.   SKIN: no ecchymoses, no rashes, no venous stasis changes, warm and dry to touch.       Laboratory:  CMP  Recent Labs   Lab 06/07/21  0834      POTASSIUM 4.2   CHLORIDE 106   CO2 30   ANIONGAP 3   *   BUN 14   CR 1.04   GFRESTIMATED 74   GFRESTBLACK 86   TRINI 9.4   MAG 1.3*   PHOS 3.1   PROTTOTAL 7.3   ALBUMIN 3.8   BILITOTAL 1.6*   ALKPHOS 101   AST 27   ALT 28     CBC  Recent Labs   Lab 06/07/21  0834   WBC 4.2   RBC 4.35*   HGB 12.9*   HCT 40.1   MCV 92   MCH 29.7   MCHC 32.2   RDW 13.2        ECG 6/7/2021      TTE 12/9/2020:  Interpretation Summary  Global and regional left ventricular function is hyperkinetic with an EF of  65-70%.  Right ventricular function, chamber size, wall motion, and thickness are  normal.  Pulmonary artery systolic pressure cannot be assessed.  The inferior vena cava is normal.  No pericardial effusion is present.  There has been no change.    Coronary angiogram  7/15/2020:  Dominance: Right  Left Anterior Descending   Prox LAD lesion is 40% stenosed.   First Diagonal Branch   The vessel is small.   Second Diagonal Branch   The vessel is moderate in size.   Third Diagonal Branch   The vessel is small.   Left Circumflex   First Obtuse Marginal Branch   The vessel is moderate in size.   1st Mrg lesion is 50% stenosed.   Second Obtuse Marginal Branch   The vessel is moderate in size.   2nd Mrg-1 lesion is 80% stenosed.   2nd Mrg-2 lesion is 80% stenosed.   Third Obtuse Marginal Branch   The vessel is moderate in size.   Right Coronary Artery   Prox RCA lesion is 20% stenosed.   Dist RCA lesion is 40% stenosed.   Right Posterior Descending Artery   RPDA lesion is 20% stenosed.

## 2021-06-07 NOTE — PRE-PROCEDURE
GENERAL PRE-PROCEDURE:   Procedure:  Coronary angiogram with possible intervention, right heart catheterization  Date/Time:  6/7/2021 9:21 AM    Written consent obtained?: Yes    Risks and benefits: Risks, benefits and alternatives were discussed    DC Plan: Appropriate discharge home plan in place for patients who are going home after procedure   Consent given by:  Patient  Patient states understanding of procedure being performed: Yes    Patient's understanding of procedure matches consent: Yes    Procedure consent matches procedure scheduled: Yes    Expected level of sedation:  Moderate  Appropriately NPO:  Yes  ASA Class:  Class 1- healthy patient  Mallampati  :  Grade 1- soft palate, uvula, tonsillar pillars, and posterior pharyngeal wall visible  Lungs:  Lungs clear with good breath sounds bilaterally  Heart:  Normal heart sounds and rate  History & Physical reviewed:  Abbreviated history and physical done prior to moderate sedation  Statement of review:  I have reviewed the lab findings, diagnostic data, medications, and the plan for sedation

## 2021-06-07 NOTE — PROGRESS NOTES
Pt admitted to  for a CORS.  PIV placed and labs in epic.  Consent and H and P up to date.  Pt took aspirin 325mg here.  Pt ready for procedure.  Pt diabetic and glucose 125 at 830.  Pt only on metformin.  Magnesium 1.3, MD notified and replacement ordered.  4 grams Magnesium ordered, 2 grams hung.  Anmol JIMENEZ report given to and will start the other 2 grams per order.

## 2021-06-08 ENCOUNTER — THERAPY VISIT (OUTPATIENT)
Dept: OCCUPATIONAL THERAPY | Facility: CLINIC | Age: 66
End: 2021-06-08
Payer: MEDICARE

## 2021-06-08 DIAGNOSIS — M79.642 BILATERAL HAND PAIN: ICD-10-CM

## 2021-06-08 DIAGNOSIS — M79.641 BILATERAL HAND PAIN: ICD-10-CM

## 2021-06-08 DIAGNOSIS — M25.642 STIFFNESS OF JOINTS OF BOTH HANDS: Primary | ICD-10-CM

## 2021-06-08 DIAGNOSIS — M25.641 STIFFNESS OF JOINTS OF BOTH HANDS: Primary | ICD-10-CM

## 2021-06-08 LAB
EBV DNA # SPEC NAA+PROBE: NORMAL {COPIES}/ML
EBV DNA SPEC NAA+PROBE-LOG#: NORMAL {LOG_COPIES}/ML
INTERPRETATION ECG - MUSE: NORMAL

## 2021-06-08 PROCEDURE — 97110 THERAPEUTIC EXERCISES: CPT | Mod: GO | Performed by: OCCUPATIONAL THERAPIST

## 2021-06-08 NOTE — RESULT ENCOUNTER NOTE
Pt called with labs  Tac dose has been stable since Feb- Creat improved  Tac level 11.8 at 11 hours    No dose changes at this time- recheck in July.

## 2021-06-08 NOTE — PROGRESS NOTES
SOAP note objective information for 6/8/2021.  Please refer to the daily flowsheet for treatment today, total treatment time and time spent performing 1:1 timed codes.     Diagnosis: Bilateral hand stiffness (L>R)  DOI: A few years ago (Order date: 5/17/21)  Referring physician: Markell Clemons MD    Pain Level (Scale 0-10)   6/1/2021   At Rest 0/10   With Use 1-2/10     Pain Description  Date 6/1/2021   Location hand   Pain Quality Aching   Frequency intermittent     Pain is worst  daytime   Exacerbated by Stretching fingers   Relieved by heat   Progression Unchanged     Edema  None    Sensation   WNL throughout all nerve distributions; per patient report    ROM  Hand 6/1/2021 6/1/2021   AROM(PROM) Left Right   Index MP -15/65 0/65   PIP 0/50 0/54   DIP 0/35 -15/50   KOROMA     Long MP 0/60 0/60   PIP 0/75 -30/75   DIP 0/45 0/59   KOROMA     Ring MP 0/55 0/60   PIP 0/62 0/75   DIP 0/35 0/53   KOROMA     Small MP 0/65 0/70   PIP 0/85 0/95   DIP 0/40 0/65   KOROMA       Distal palmar crease measurements:  6/1/2021  Right hand: 4-4.5 cm  Left hand: 5.5 cm    6/8/2021  Right hand post-treatment: 2-2.5 cm  Left hand post-treatment: 5.5 cm      ROM  Thumb 6/1/2021 6/1/2021   AROM  (PROM) Left Right   MP 0/50 0/45   IP 0/60 0/65   RABD 62 60   PABD 65 60   Kapandji Opposition Scale (0-10/10) Small finger MP joint Small finger MP joint       Strength   (Measured in pounds)  Pain Report: - none  + mild    ++ moderate    +++ severe    6/1/2021 6/1/2021   Trials Left Right   1  2  3 14  10  10   16  16  13   Average 11.3 15     Lat Pinch 6/1/2021 6/1/2021   Trials Left Right   1 12 14   Average 12 14     3 Pt Pinch 6/1/2021 6/1/2021   Trials Left Right   1 8 11   Average 8 11

## 2021-06-08 NOTE — TELEPHONE ENCOUNTER
Pharmacy contcted back for Dr Merida with message :   Also could you tell divine larawm, patient most recent GFR 66, no concern at all for renal.     Mariangel     They will fill the Metformin Shannan Xie RN on 6/8/2021 at 1:08 PM

## 2021-06-09 LAB
ALLOMAP SCORE (EXTERNAL): 38
DONOR IDENTIFICATION: NORMAL
DONOR IDENTIFICATION: NORMAL
DSA COMMENTS: NORMAL
DSA COMMENTS: NORMAL
DSA PRESENT: NO
DSA PRESENT: NO
DSA TEST METHOD: NORMAL
DSA TEST METHOD: NORMAL
IMMUKNOW IMMUNE CELL FUNCTION: 218 NG/ML
NEGATIVE PREDICTIVE VALUE PERCENT (EXTERNAL): 98.2 %
ORGAN: NORMAL
ORGAN: NORMAL
POSITIVE PREDICTIVE VALUE PERCENT (EXTERNAL): NORMAL %
SA1 CELL: NORMAL
SA1 COMMENTS: NORMAL
SA1 HI RISK ABY: NORMAL
SA1 MOD RISK ABY: NORMAL
SA1 TEST METHOD: NORMAL
SA2 CELL: NORMAL
SA2 COMMENTS: NORMAL
SA2 HI RISK ABY UA: NORMAL
SA2 MOD RISK ABY: NORMAL
SA2 TEST METHOD: NORMAL
UNACCEPTABLE ANTIGEN: NORMAL
UNOS CPRA: 0

## 2021-06-10 DIAGNOSIS — Z48.298 AFTERCARE FOLLOWING ORGAN TRANSPLANT: ICD-10-CM

## 2021-06-10 DIAGNOSIS — Z94.0 KIDNEY REPLACED BY TRANSPLANT: ICD-10-CM

## 2021-06-10 DIAGNOSIS — E11.29 TYPE 2 DIABETES MELLITUS WITH OTHER DIABETIC KIDNEY COMPLICATION, WITHOUT LONG-TERM CURRENT USE OF INSULIN (H): ICD-10-CM

## 2021-06-10 DIAGNOSIS — Z79.899 ENCOUNTER FOR LONG-TERM CURRENT USE OF MEDICATION: ICD-10-CM

## 2021-06-10 LAB
ANION GAP SERPL CALCULATED.3IONS-SCNC: 7 MMOL/L (ref 3–14)
BUN SERPL-MCNC: 15 MG/DL (ref 7–30)
CALCIUM SERPL-MCNC: 9.3 MG/DL (ref 8.5–10.1)
CHLORIDE SERPL-SCNC: 108 MMOL/L (ref 94–109)
CO2 SERPL-SCNC: 23 MMOL/L (ref 20–32)
CREAT SERPL-MCNC: 1.01 MG/DL (ref 0.66–1.25)
ERYTHROCYTE [DISTWIDTH] IN BLOOD BY AUTOMATED COUNT: 13.1 % (ref 10–15)
GFR SERPL CREATININE-BSD FRML MDRD: 77 ML/MIN/{1.73_M2}
GLUCOSE SERPL-MCNC: 220 MG/DL (ref 70–99)
HCT VFR BLD AUTO: 37.9 % (ref 40–53)
HGB BLD-MCNC: 12.6 G/DL (ref 13.3–17.7)
MCH RBC QN AUTO: 29.9 PG (ref 26.5–33)
MCHC RBC AUTO-ENTMCNC: 33.2 G/DL (ref 31.5–36.5)
MCV RBC AUTO: 90 FL (ref 78–100)
PLATELET # BLD AUTO: 197 10E9/L (ref 150–450)
POTASSIUM SERPL-SCNC: 4.1 MMOL/L (ref 3.4–5.3)
RBC # BLD AUTO: 4.21 10E12/L (ref 4.4–5.9)
SODIUM SERPL-SCNC: 138 MMOL/L (ref 133–144)
WBC # BLD AUTO: 4.3 10E9/L (ref 4–11)

## 2021-06-10 PROCEDURE — 87799 DETECT AGENT NOS DNA QUANT: CPT | Performed by: INTERNAL MEDICINE

## 2021-06-10 PROCEDURE — 36415 COLL VENOUS BLD VENIPUNCTURE: CPT | Performed by: PATHOLOGY

## 2021-06-10 PROCEDURE — 85027 COMPLETE CBC AUTOMATED: CPT | Performed by: PATHOLOGY

## 2021-06-10 PROCEDURE — 80048 BASIC METABOLIC PNL TOTAL CA: CPT | Performed by: PATHOLOGY

## 2021-06-14 ENCOUNTER — ANCILLARY PROCEDURE (OUTPATIENT)
Dept: GENERAL RADIOLOGY | Facility: CLINIC | Age: 66
End: 2021-06-14
Attending: INTERNAL MEDICINE
Payer: MEDICARE

## 2021-06-14 ENCOUNTER — OFFICE VISIT (OUTPATIENT)
Dept: CARDIOLOGY | Facility: CLINIC | Age: 66
End: 2021-06-14
Attending: INTERNAL MEDICINE
Payer: MEDICARE

## 2021-06-14 VITALS
BODY MASS INDEX: 27.86 KG/M2 | OXYGEN SATURATION: 100 % | HEIGHT: 69 IN | SYSTOLIC BLOOD PRESSURE: 128 MMHG | WEIGHT: 188.1 LBS | DIASTOLIC BLOOD PRESSURE: 88 MMHG | HEART RATE: 83 BPM

## 2021-06-14 DIAGNOSIS — Z94.1 HEART REPLACED BY TRANSPLANT (H): ICD-10-CM

## 2021-06-14 DIAGNOSIS — Z94.1 HEART REPLACED BY TRANSPLANT (H): Primary | ICD-10-CM

## 2021-06-14 PROCEDURE — G0463 HOSPITAL OUTPT CLINIC VISIT: HCPCS

## 2021-06-14 PROCEDURE — 71046 X-RAY EXAM CHEST 2 VIEWS: CPT | Mod: GC | Performed by: RADIOLOGY

## 2021-06-14 PROCEDURE — 99215 OFFICE O/P EST HI 40 MIN: CPT | Performed by: INTERNAL MEDICINE

## 2021-06-14 ASSESSMENT — PAIN SCALES - GENERAL: PAINLEVEL: NO PAIN (0)

## 2021-06-14 ASSESSMENT — MIFFLIN-ST. JEOR: SCORE: 1618.83

## 2021-06-14 NOTE — NURSING NOTE
Transplant Coordinator Note  Reason for visit:  3 year transplant annual     Health concerns addressed today:  -New android watch that tracks actvity/calories burned--pt walked 4 miles today.  -Unable to fully close fingers to make a fist; pt will be getting therapy. Dr. Ernst discussed this may be related to sternotomy.  -Reviewed angiogram--no changes per Dr. Ernst; repeat angio next year.   ~Weight 181.8 at home this AM--maintaining with healthy diet, food portioning, exercise    Immunosuppressants   mg BID   FK 1/0.5 mg; goal ~6; level 11.8. Plan to recheck with monthly kidney labs.  No DSAs  Immuknow 258, 135, 218 this visit.  Allomaps 34, 27, 38, 37, 32, 38 this visit.     Routine screenings:    Derm: Due Aug 2021  Dental: Due  Colonoscopy: 2018 (diverticuli only)  Prostate: PSA 2.50   Eye: Done  Flu/Pneumonia: 2020 flu done; Shingrix done; COVID done     Medication record reviewed and reconciled. Questions and concerns addressed. AVS reviewed and copy available in [employer]. Pt verbalized an understanding of plan of care.      Patient Instructions  ~Please call your coordinator at [phone] with any questions or concerns.    ~Coordinator will update you on remaining results   ~MRI in August a scheduled  ~Keep weight ~ 180#   ~Labs in 6 months (for cardiology). See you back for your 4th annual next year.

## 2021-06-14 NOTE — NURSING NOTE
Chief Complaint   Patient presents with     Follow Up     3rd Anual post      Vitals were taken and medications where reconciled.    Elan Moraes, EMT  3:48 PM

## 2021-06-14 NOTE — PATIENT INSTRUCTIONS
~Please call your coordinator at [phone] with any questions or concerns.    ~Coordinator will update you on remaining results   ~MRI in August a scheduled  ~Keep weight ~ 180#; continue recording weights and vital signs.  ~Get your dental cleaning and dermatology.   ~Labs in 6 months (for cardiology). See you back for your 4th annual next year.

## 2021-06-14 NOTE — LETTER
2021      RE: Murray Nicholson  [address]  [place] [address]       Dear Colleague,    Thank you for the opportunity to participate in the care of your patient, Murray Nicholson, at the [facility] [place] [facility]. Please see a copy of my visit note below.    Cardiology Clinic Note     Yahir Turcios MD    [facility]    [address] [id number]       Ana Luisa Mcpherson MD    [facility]    [address], [facility] [address]    [place]  [address]       RE: Murray Nicholson   MRN: [id number]   : 1955      Dear Dr. Turcios and Dr. Mcpherson:      We had the pleasure of seeing Mr. Murray Nicholson for followup in our Cardiac Transplant Clinic at the [facility].  As you know, he is a very pleasant 66-year-old male who underwent orthotopic heart transplantation on 2018 and kidney transplant in 2019. His current problem list includes:     1.  Status post orthotopic heart transplantation.   2.  Neutropenia  3.  Oral mucosal ulcer secondary to neutropenia with Klebsiella infection.   4.  Pseudomonas bacteremia, resolved.   5.  Perforation of the small bowel, status post small-bowel resection and ileostomy.   6.  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   7.  Hypertension.   8.  Hyperlipidemia.   9.  End-stage renal disease requiring hemodialysis - S/P Kidney transplant    He is returning today for his 3-year annual follow-up.  He had his kidney transplantation in 2019.  He is overall doing very well.  He has no specific complaints.  No recent hospitalizations or ER visits.  He is compliant with his medications.    Current Outpatient Medications   Medication Sig     acetaminophen (TYLENOL) 325 MG tablet Take 2 tablets (650 mg) by mouth every 4 hours as needed for other (multimodal surgical pain management along with NSAIDS and opioid medication as indicated  based on pain control and physical function.)     aspirin 81 MG EC tablet Take 81 mg by mouth daily     atorvastatin (LIPITOR) 40 MG tablet Take 1 tablet (40 mg) by mouth daily     biotin 1000 MCG TABS tablet Take 1,000 mcg by mouth daily Patient takes every other day     blood glucose (ACCU-CHEK GUIDE) test strip Use to test blood sugar 3 times daily or as directed.     blood glucose monitoring (ACCU-CHEK FASTCLIX) lancets USE TO TEST 3 TIMES DAILY OR AS DIRECTED.     calcium citrate and vitamin D (CITRACAL) 200-250 MG-UNIT TABS per tablet Take 1 tablet by mouth 2 times daily     fluticasone (FLONASE) 50 MCG/ACT nasal spray SHAKE LIQUID AND USE 1 SPRAY IN EACH NOSTRIL DAILY     loratadine (CLARITIN) 10 MG tablet Take 10 mg by mouth daily Patient takes at bedtime     metFORMIN (GLUCOPHAGE-XR) 500 MG 24 hr tablet Take 4 tablets (2,000 mg) by mouth daily (with breakfast)     multivitamin (CENTRUM SILVER) tablet Take 1 tablet by mouth daily     mycophenolate (GENERIC EQUIVALENT) 250 MG capsule Take 3 capsules (750 mg) by mouth 2 times daily     pantoprazole (PROTONIX) 20 MG EC tablet Take 1 tablet (20 mg) by mouth every other day     sulfamethoxazole-trimethoprim (BACTRIM) 400-80 MG tablet Take 1 tablet by mouth daily     tacrolimus (GENERIC EQUIVALENT) 0.5 MG capsule Take ONE cap every PM (0.5 mg every evening)     tacrolimus (GENERIC EQUIVALENT) 1 MG capsule Take ONE cap every AM (1 mg every morning)     Zinc Sulfate (ZINC 15 PO)      Lactobacillus (ACIDOPHILUS PO) 4 billion active cultures     sodium bicarbonate 650 MG tablet Take 2 tablets (1,300 mg) by mouth daily (Patient not taking: Reported on 6/14/2021)     No current facility-administered medications for this visit.      Facility-Administered Medications Ordered in Other Visits   Medication     diatrizoate meglumine-sodium (GASTROGRAFIN/GASTROVIEW) 66-10 % solution 480 mL        REVIEW OF SYSTEMS:  A detailed 10-point review of systems was obtained as  "described in History of Present Illness.  All other systems are reviewed and are negative.      PHYSICAL EXAMINATION:   /88 (BP Location: Right arm, Patient Position: Chair, Cuff Size: Adult Regular)   Pulse 83   Ht 1.745 m (5' 8.7\")   Wt 85.3 kg (188 lb 1.6 oz)   SpO2 100%   BMI 28.02 kg/m    He was awake, alert, oriented x3.  He was in no apparent distress.  He was comfortable.  He had no pallor, cyanosis or jaundice.  He had no jugular venous distention.  His carotids were 2+ bilaterally.  Cardiac auscultation revealed normal S1 and S2 with no murmur rub or gallop.  Auscultation of his lungs revealed equal air entry on both sides with no added sounds.  His abdomen was soft with no guarding no tenderness no rigidity no guarding.  He had no focal neurological deficit.  His extremities showed no edema.    Testing:    EKG (06/2021)  Sinus rhythm  Non specific changes in the lateral leads  Normal ECG  When compared with ECG of 15-JUL-2020 09:46,  No significant change was found     Cardiac MRI (07/2020)  1. The LV is normal in cavity size and wall thickness. The global systolic function is normal. The LVEF is  63%. There are no regional wall motion abnormalities.     2. The RV is normal in cavity size. The global systolic function is normal. The RVEF is 63%.      3. Both atria are enlarged from transplantation.     4. There is no significant valvular disease.      5. Late gadolinium enhancement imaging shows no MI, fibrosis or infiltrative disease.      6. Regadenoson stress perfusion imaging shows no ischemia.     7. There is no pericardial effusion or thickening.     8. There is no intracardiac thrombus.     9. The distal ascending aorta (after the anastomosis) is mildly enlarged measuring 4.0 cm.      CONCLUSIONS: No myocardial ischemia or fibrosis. Normal cardiac function, LVEF 63% and RVEF 63%.     RHC (06/2021)  RA 5  RV 30/5  PA 30/12 (20)  PCWP 12  PA% 79.5%  CO/CI 7.12/3.62      Coronary Angiogram " (06/2021):    Left Anterior Descending   Prox LAD lesion is 40% stenosed.   First Diagonal Branch   The vessel is small.   Second Diagonal Branch   The vessel is moderate in size.   Third Diagonal Branch   The vessel is small.   Left Circumflex   First Obtuse Marginal Branch   The vessel is moderate in size.   1st Mrg lesion is 50% stenosed.   Second Obtuse Marginal Branch   The vessel is moderate in size.   2nd Mrg-1 lesion is 60% stenosed. The stenosis improved slightly with the use of IC nitroglycerin   2nd Mrg-2 lesion is 70% stenosed.   Third Obtuse Marginal Branch   The vessel is moderate in size.   Right Coronary Artery   Prox RCA lesion is 20% stenosed.   Dist RCA lesion is 40% stenosed.   Right Posterior Descending Artery   RPDA lesion is 20% stenosed.       ALLOMAP: 38 (stable)     DSA:  Lab Results   Component Value Date    SAITESTMET [facility] 06/07/2021    SAICELL Class I 06/07/2021    [id number] None 06/07/2021    [id number] None 06/07/2021    SAIREPCOM  06/07/2021      Test performed by modified procedure. Serum heat inactivated and tested   by a modified ([place]) protocol including fetal calf serum addition.   High-risk, mfi >3,000. Mod-risk, mfi 500-3,000.       Lab Results   Component Value Date    SAIITESTME [facility] 06/07/2021    SAIICELL Class II 06/07/2021    [id number] None 06/07/2021    [id number] None 06/07/2021    SAIIREPCOM  06/07/2021      Test performed by modified procedure. Serum heat inactivated and tested   by a modified ([place]) protocol including fetal calf serum addition.   High-risk, mfi >3,000. Mod-risk, mfi 500-3,000.         IMMUNOSUPPRESSANT LEVELS:  Lab Results   Component Value Date    TACROL 11.8 06/07/2021    DOSTAC Not Provided 06/07/2021       Lab Results   Component Value Date    CSPEC Plasma 06/07/2021   .  PSA   Date Value Ref Range Status   06/07/2021 2.50 0 - 4 ug/L Final     Comment:     Assay Method:  Chemiluminescence using Siemens Vista analyzer     TSH   Date Value Ref  Range Status   04/16/2018 2.25 0.40 - 4.00 mU/L Final     CMV/EBV - negative    CBC RESULTS:   Recent Labs   Lab Test 06/10/21  1056   WBC 4.3   RBC 4.21*   HGB 12.6*   HCT 37.9*   MCV 90   MCH 29.9   MCHC 33.2   RDW 13.1        Recent Labs   Lab Test 06/10/21  1056 06/07/21  0834    139   POTASSIUM 4.1 4.2   CHLORIDE 108 106   CO2 23 30   ANIONGAP 7 3   * 125*   BUN 15 14   CR 1.01 1.04   TRINI 9.3 9.4     Liver Function Studies -   Recent Labs   Lab Test 06/07/21  0834   PROTTOTAL 7.3   ALBUMIN 3.8   BILITOTAL 1.6*   ALKPHOS 101   AST 27   ALT 28         ASSESSMENT AND PLAN:    Mr. Murray Nicholson is a 66-year-old male status post orthotopic heart transplantation in June with a very complex postoperative course who returns today for followup.     #Orthotopic Heart Transplant (06/2018)     Graft Function: Normal.  Patient scheduled to have a repeat MRI in July 2021  No evidence of rejection. No biopsy now but Allomap has been stable. No DSA.     CAV PPx: Patient has a single obstructive coronary artery disease of the OM2 with sequential 80% stenoses. This was unchanged from his prior angiogram that was done 30 days after his transplant. Likely donor disease. Will continue ASA 81mg and Atorvastatin 40mg daily. He has non obstructive disease on his LAD and RCA as well.     Immunosuppression: Continue Tacrolimus with a goal of 8-10 and  mg bid. Lower dose of Cellcept due to neutropenia in the past     #Hypertension. Off all antihypertensive medications after his renal transplant. BP under control. Will continue to monitor.      #Kidney transplant     - followed by transplant nephrology  - on prophylactic Bactrim  - Renal function stable    # High risk for CMV.  Negative    #Diabetes - On metformin and sliding scale insulin. Followed by endocrinology.       RTC in 6 months with echo and Allomap. Patient will call in the interim if he has any worsening symptoms.       Sincerely,    Klever  MD Klever   [facility] for Pulmonary Hypertension  Section of Advanced Heart Failure   Cardiovascular Division  [facility]   [phone]

## 2021-06-16 ENCOUNTER — THERAPY VISIT (OUTPATIENT)
Dept: OCCUPATIONAL THERAPY | Facility: CLINIC | Age: 66
End: 2021-06-16
Payer: MEDICARE

## 2021-06-16 DIAGNOSIS — M25.641 STIFFNESS OF JOINTS OF BOTH HANDS: Primary | ICD-10-CM

## 2021-06-16 DIAGNOSIS — M25.642 STIFFNESS OF JOINTS OF BOTH HANDS: Primary | ICD-10-CM

## 2021-06-16 DIAGNOSIS — M79.642 BILATERAL HAND PAIN: ICD-10-CM

## 2021-06-16 DIAGNOSIS — M79.641 BILATERAL HAND PAIN: ICD-10-CM

## 2021-06-16 PROCEDURE — 97110 THERAPEUTIC EXERCISES: CPT | Mod: GO | Performed by: OCCUPATIONAL THERAPIST

## 2021-06-16 PROCEDURE — 97760 ORTHOTIC MGMT&TRAING 1ST ENC: CPT | Mod: GO | Performed by: OCCUPATIONAL THERAPIST

## 2021-06-16 NOTE — PROGRESS NOTES
SOAP note objective information for 6/16/2021.  Please refer to the daily flowsheet for treatment today, total treatment time and time spent performing 1:1 timed codes.     Diagnosis: Bilateral hand stiffness (L>R)  DOI: A few years ago (Order date: 5/17/21)  Referring physician: Markell Clemons MD    Pain Level (Scale 0-10)   6/1/2021   At Rest 0/10   With Use 1-2/10     Pain Description  Date 6/1/2021   Location hand   Pain Quality Aching   Frequency intermittent     Pain is worst  daytime   Exacerbated by Stretching fingers   Relieved by heat   Progression Unchanged     Edema  None    Sensation   WNL throughout all nerve distributions; per patient report    ROM  Hand 6/1/2021 6/1/2021   AROM(PROM) Left Right   Index MP -15/65 0/65   PIP 0/50 0/54   DIP 0/35 -15/50   KOROMA     Long MP 0/60 0/60   PIP 0/75 -30/75   DIP 0/45 0/59   KOROMA     Ring MP 0/55 0/60   PIP 0/62 0/75   DIP 0/35 0/53   KOROMA     Small MP 0/65 0/70   PIP 0/85 0/95   DIP 0/40 0/65   KOROMA       Distal palmar crease measurements:  6/1/2021  Right hand: 4-4.5 cm  Left hand: 5.5 cm    6/8/2021  Right hand post-treatment: 2-2.5 cm  Left hand post-treatment: 5.5 cm    6/16/2021  Right hand pre-treatment: Lacks 4.0 cm  Left hand pre-treatment: Lacks 5.5-6.0 cm      ROM  Thumb 6/1/2021 6/1/2021   AROM  (PROM) Left Right   MP 0/50 0/45   IP 0/60 0/65   RABD 62 60   PABD 65 60   Kapandji Opposition Scale (0-10/10) Small finger MP joint Small finger MP joint       Strength   (Measured in pounds)  Pain Report: - none  + mild    ++ moderate    +++ severe    6/1/2021 6/1/2021   Trials Left Right   1  2  3 14  10  10   16  16  13   Average 11.3 15     Lat Pinch 6/1/2021 6/1/2021   Trials Left Right   1 12 14   Average 12 14     3 Pt Pinch 6/1/2021 6/1/2021   Trials Left Right   1 8 11   Average 8 11     Special Tests  Key: -/+ indicates absence/presence    6/16/2021 6/16/2021     Left Right   Interosseous tightness test    Testing:    Flex MP, assess amount of  passive PIP flexion    HE MP, check passive PIP flexion    If less PIP flexion with MP in HE, there is intrinsic tightness (could be lumbrical)   + +   Lumbrical tightness test    Testing:    Check active PIP/DIP flexion with MP extended    Check passive PIP/DIP flexion with MP extended    If passive finger ROM is normal and there is no interosseous tightness, then assume lumbrical tightness   - -

## 2021-06-17 ASSESSMENT — ENCOUNTER SYMPTOMS: NEW SYMPTOMS OF CORONARY ARTERY DISEASE: 0

## 2021-06-17 NOTE — PROGRESS NOTES
Cardiology Clinic Note     Yahir Turcios MD    [facility]    [address] [place]  [address]       Ana Luisa Mcpherson MD    [facility]    [address], [facility] [address]    [place]  [address]       RE: Murray Nicholson   MRN: [id number]   : 1955      Dear Dr. Turcios and Dr. Mcpherson:      We had the pleasure of seeing Mr. Murray Nicholson for followup in our Cardiac Transplant Clinic at the [facility].  As you know, he is a very pleasant 66-year-old male who underwent orthotopic heart transplantation on 2018 and kidney transplant in 2019. His current problem list includes:     1.  Status post orthotopic heart transplantation.   2.  Neutropenia  3.  Oral mucosal ulcer secondary to neutropenia with Klebsiella infection.   4.  Pseudomonas bacteremia, resolved.   5.  Perforation of the small bowel, status post small-bowel resection and ileostomy.   6.  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   7.  Hypertension.   8.  Hyperlipidemia.   9.  End-stage renal disease requiring hemodialysis - S/P Kidney transplant    He is returning today for his 3-year annual follow-up.  He had his kidney transplantation in 2019.  He is overall doing very well.  He has no specific complaints.  No recent hospitalizations or ER visits.  He is compliant with his medications.    Current Outpatient Medications   Medication Sig     acetaminophen (TYLENOL) 325 MG tablet Take 2 tablets (650 mg) by mouth every 4 hours as needed for other (multimodal surgical pain management along with NSAIDS and opioid medication as indicated based on pain control and physical function.)     aspirin 81 MG EC tablet Take 81 mg by mouth daily     atorvastatin (LIPITOR) 40 MG tablet Take 1 tablet (40 mg) by mouth daily     biotin 1000 MCG TABS tablet Take 1,000 mcg by mouth daily Patient takes every other day     blood glucose (ACCU-CHEK GUIDE) test strip Use to test blood sugar 3 times daily  "or as directed.     blood glucose monitoring (ACCU-CHEK FASTCLIX) lancets USE TO TEST 3 TIMES DAILY OR AS DIRECTED.     calcium citrate and vitamin D (CITRACAL) 200-250 MG-UNIT TABS per tablet Take 1 tablet by mouth 2 times daily     fluticasone (FLONASE) 50 MCG/ACT nasal spray SHAKE LIQUID AND USE 1 SPRAY IN EACH NOSTRIL DAILY     loratadine (CLARITIN) 10 MG tablet Take 10 mg by mouth daily Patient takes at bedtime     metFORMIN (GLUCOPHAGE-XR) 500 MG 24 hr tablet Take 4 tablets (2,000 mg) by mouth daily (with breakfast)     multivitamin (CENTRUM SILVER) tablet Take 1 tablet by mouth daily     mycophenolate (GENERIC EQUIVALENT) 250 MG capsule Take 3 capsules (750 mg) by mouth 2 times daily     pantoprazole (PROTONIX) 20 MG EC tablet Take 1 tablet (20 mg) by mouth every other day     sulfamethoxazole-trimethoprim (BACTRIM) 400-80 MG tablet Take 1 tablet by mouth daily     tacrolimus (GENERIC EQUIVALENT) 0.5 MG capsule Take ONE cap every PM (0.5 mg every evening)     tacrolimus (GENERIC EQUIVALENT) 1 MG capsule Take ONE cap every AM (1 mg every morning)     Zinc Sulfate (ZINC 15 PO)      Lactobacillus (ACIDOPHILUS PO) 4 billion active cultures     sodium bicarbonate 650 MG tablet Take 2 tablets (1,300 mg) by mouth daily (Patient not taking: Reported on 6/14/2021)     No current facility-administered medications for this visit.      Facility-Administered Medications Ordered in Other Visits   Medication     diatrizoate meglumine-sodium (GASTROGRAFIN/GASTROVIEW) 66-10 % solution 480 mL        REVIEW OF SYSTEMS:  A detailed 10-point review of systems was obtained as described in History of Present Illness.  All other systems are reviewed and are negative.      PHYSICAL EXAMINATION:   /88 (BP Location: Right arm, Patient Position: Chair, Cuff Size: Adult Regular)   Pulse 83   Ht 1.745 m (5' 8.7\")   Wt 85.3 kg (188 lb 1.6 oz)   SpO2 100%   BMI 28.02 kg/m    He was awake, alert, oriented x3.  He was in no " apparent distress.  He was comfortable.  He had no pallor, cyanosis or jaundice.  He had no jugular venous distention.  His carotids were 2+ bilaterally.  Cardiac auscultation revealed normal S1 and S2 with no murmur rub or gallop.  Auscultation of his lungs revealed equal air entry on both sides with no added sounds.  His abdomen was soft with no guarding no tenderness no rigidity no guarding.  He had no focal neurological deficit.  His extremities showed no edema.    Testing:    EKG (06/2021)  Sinus rhythm  Non specific changes in the lateral leads  Normal ECG  When compared with ECG of 15-JUL-2020 09:46,  No significant change was found     Cardiac MRI (07/2020)  1. The LV is normal in cavity size and wall thickness. The global systolic function is normal. The LVEF is  63%. There are no regional wall motion abnormalities.     2. The RV is normal in cavity size. The global systolic function is normal. The RVEF is 63%.      3. Both atria are enlarged from transplantation.     4. There is no significant valvular disease.      5. Late gadolinium enhancement imaging shows no MI, fibrosis or infiltrative disease.      6. Regadenoson stress perfusion imaging shows no ischemia.     7. There is no pericardial effusion or thickening.     8. There is no intracardiac thrombus.     9. The distal ascending aorta (after the anastomosis) is mildly enlarged measuring 4.0 cm.      CONCLUSIONS: No myocardial ischemia or fibrosis. Normal cardiac function, LVEF 63% and RVEF 63%.     RHC (06/2021)  RA 5  RV 30/5  PA 30/12 (20)  PCWP 12  PA% 79.5%  CO/CI 7.12/3.62      Coronary Angiogram (06/2021):    Left Anterior Descending   Prox LAD lesion is 40% stenosed.   First Diagonal Branch   The vessel is small.   Second Diagonal Branch   The vessel is moderate in size.   Third Diagonal Branch   The vessel is small.   Left Circumflex   First Obtuse Marginal Branch   The vessel is moderate in size.   1st Mrg lesion is 50% stenosed.   Second  Obtuse Marginal Branch   The vessel is moderate in size.   2nd Mrg-1 lesion is 60% stenosed. The stenosis improved slightly with the use of IC nitroglycerin   2nd Mrg-2 lesion is 70% stenosed.   Third Obtuse Marginal Branch   The vessel is moderate in size.   Right Coronary Artery   Prox RCA lesion is 20% stenosed.   Dist RCA lesion is 40% stenosed.   Right Posterior Descending Artery   RPDA lesion is 20% stenosed.       ALLOMAP: 38 (stable)     DSA:  Lab Results   Component Value Date    SAITESTMET [facility] 06/07/2021    SAICELL Class I 06/07/2021    [id number] None 06/07/2021    [id number] None 06/07/2021    SAIREPCOM  06/07/2021      Test performed by modified procedure. Serum heat inactivated and tested   by a modified ([place]) protocol including fetal calf serum addition.   High-risk, mfi >3,000. Mod-risk, mfi 500-3,000.       Lab Results   Component Value Date    SAIITESTME [facility] 06/07/2021    SAIICELL Class II 06/07/2021    [id number] None 06/07/2021    [id number] None 06/07/2021    SAIIREPCOM  06/07/2021      Test performed by modified procedure. Serum heat inactivated and tested   by a modified ([place]) protocol including fetal calf serum addition.   High-risk, mfi >3,000. Mod-risk, mfi 500-3,000.         IMMUNOSUPPRESSANT LEVELS:  Lab Results   Component Value Date    TACROL 11.8 06/07/2021    DOSTAC Not Provided 06/07/2021       Lab Results   Component Value Date    CSPEC Plasma 06/07/2021   .  PSA   Date Value Ref Range Status   06/07/2021 2.50 0 - 4 ug/L Final     Comment:     Assay Method:  Chemiluminescence using Siemens Vista analyzer     TSH   Date Value Ref Range Status   04/16/2018 2.25 0.40 - 4.00 mU/L Final     CMV/EBV - negative    CBC RESULTS:   Recent Labs   Lab Test 06/10/21  1056   WBC 4.3   RBC 4.21*   HGB 12.6*   HCT 37.9*   MCV 90   MCH 29.9   MCHC 33.2   RDW 13.1        Recent Labs   Lab Test 06/10/21  1056 06/07/21  0834    139   POTASSIUM 4.1 4.2   CHLORIDE 108 106   CO2 23 30    ANIONGAP 7 3   * 125*   BUN 15 14   CR 1.01 1.04   TRINI 9.3 9.4     Liver Function Studies -   Recent Labs   Lab Test 06/07/21  0834   PROTTOTAL 7.3   ALBUMIN 3.8   BILITOTAL 1.6*   ALKPHOS 101   AST 27   ALT 28         ASSESSMENT AND PLAN:    Mr. Murray Nicholson is a 66-year-old male status post orthotopic heart transplantation in June with a very complex postoperative course who returns today for followup.     #Orthotopic Heart Transplant (06/2018)     Graft Function: Normal.  Patient scheduled to have a repeat MRI in July 2021  No evidence of rejection. No biopsy now but Allomap has been stable. No DSA.     CAV PPx: Patient has a single obstructive coronary artery disease of the OM2 with sequential 80% stenoses. This was unchanged from his prior angiogram that was done 30 days after his transplant. Likely donor disease. Will continue ASA 81mg and Atorvastatin 40mg daily. He has non obstructive disease on his LAD and RCA as well.     Immunosuppression: Continue Tacrolimus with a goal of 8-10 and  mg bid. Lower dose of Cellcept due to neutropenia in the past     #Hypertension. Off all antihypertensive medications after his renal transplant. BP under control. Will continue to monitor.      #Kidney transplant     - followed by transplant nephrology  - on prophylactic Bactrim  - Renal function stable    # High risk for CMV.  Negative    #Diabetes - On metformin and sliding scale insulin. Followed by endocrinology.       RTC in 6 months with echo and Allomap. Patient will call in the interim if he has any worsening symptoms.       Sincerely,    Klever Ernst MD   Center for Pulmonary Hypertension  Section of Advanced Heart Failure   Cardiovascular Division  [facility]   [phone]

## 2021-06-24 ENCOUNTER — DOCUMENTATION ONLY (OUTPATIENT)
Dept: ENDOCRINOLOGY | Facility: CLINIC | Age: 66
End: 2021-06-24

## 2021-07-06 ENCOUNTER — RESULTS ONLY (OUTPATIENT)
Dept: OTHER | Facility: CLINIC | Age: 66
End: 2021-07-06

## 2021-07-06 DIAGNOSIS — Z48.298 AFTERCARE FOLLOWING ORGAN TRANSPLANT: ICD-10-CM

## 2021-07-06 DIAGNOSIS — Z79.899 ENCOUNTER FOR LONG-TERM CURRENT USE OF MEDICATION: ICD-10-CM

## 2021-07-06 DIAGNOSIS — Z94.0 KIDNEY REPLACED BY TRANSPLANT: ICD-10-CM

## 2021-07-06 LAB
ANION GAP SERPL CALCULATED.3IONS-SCNC: 10 MMOL/L (ref 3–14)
BUN SERPL-MCNC: 18 MG/DL (ref 7–30)
CALCIUM SERPL-MCNC: 9.3 MG/DL (ref 8.5–10.1)
CHLORIDE SERPL-SCNC: 105 MMOL/L (ref 94–109)
CO2 SERPL-SCNC: 24 MMOL/L (ref 20–32)
CREAT SERPL-MCNC: 1.09 MG/DL (ref 0.66–1.25)
CREAT UR-MCNC: 188 MG/DL
GFR SERPL CREATININE-BSD FRML MDRD: 70 ML/MIN/{1.73_M2}
GLUCOSE SERPL-MCNC: 188 MG/DL (ref 70–99)
POTASSIUM SERPL-SCNC: 3.9 MMOL/L (ref 3.4–5.3)
PROT UR-MCNC: 0.23 G/L
PROT/CREAT 24H UR: 0.12 G/G CR (ref 0–0.2)
SODIUM SERPL-SCNC: 139 MMOL/L (ref 133–144)
TACROLIMUS BLD-MCNC: 6.8 UG/L (ref 5–15)
TME LAST DOSE: NORMAL H

## 2021-07-06 PROCEDURE — 86833 HLA CLASS II HIGH DEFIN QUAL: CPT | Performed by: INTERNAL MEDICINE

## 2021-07-06 PROCEDURE — 80197 ASSAY OF TACROLIMUS: CPT | Performed by: INTERNAL MEDICINE

## 2021-07-06 PROCEDURE — 36415 COLL VENOUS BLD VENIPUNCTURE: CPT | Performed by: PATHOLOGY

## 2021-07-06 PROCEDURE — 80048 BASIC METABOLIC PNL TOTAL CA: CPT | Performed by: PATHOLOGY

## 2021-07-06 PROCEDURE — 87799 DETECT AGENT NOS DNA QUANT: CPT | Performed by: INTERNAL MEDICINE

## 2021-07-06 PROCEDURE — 84156 ASSAY OF PROTEIN URINE: CPT | Performed by: PATHOLOGY

## 2021-07-06 PROCEDURE — 86832 HLA CLASS I HIGH DEFIN QUAL: CPT | Performed by: INTERNAL MEDICINE

## 2021-07-21 NOTE — PROGRESS NOTES
Admitted from: [facility] ED  Via: Transort  Accompanied: N/A  Belongings: wallet sent to security; all other belongings at bedside  Admission Profile: completed  Teaching: oriented to unit  Access: L PIV infusing  Telemetry: yes  Ht/Wt: completed     ambulatory

## 2021-08-04 ENCOUNTER — TELEPHONE (OUTPATIENT)
Dept: OTOLARYNGOLOGY | Facility: CLINIC | Age: 66
End: 2021-08-04

## 2021-08-04 DIAGNOSIS — H91.90 HEARING LOSS: Primary | ICD-10-CM

## 2021-08-04 NOTE — TELEPHONE ENCOUNTER
I tried calling this patient to schedule a WIN and a appt with Dr. Godwin on the same day. Also this patient needs to be scheduled as a return patient but in a NEW appointment slot.      If you are uncomfortable scheduling a return patient in a new slot. Schedule them as a RUST New patient and send me a message so I can change it to a return. Please and thank you.    Appt Note: Follow up with WIN prior (per Stephany schedule as a return appt in a new appt slot)

## 2021-08-07 NOTE — PROGRESS NOTES
Transplant Social Work Services Progress Note      Collaborated with: Murray & girlfriend    Data: Murray had colostomy yesterday. He left message for SW late Monday night with some questions.   Intervention: Met with Murray for supportive visit. Was introduced to girlfriend  Assessment: Murray reports that he's feeling frustrated with so many people coming and going from his room. He's asking for a break from all staff from 4-4:30 so he and his girlfriend can watch Jeopardy. (DINH gave this request to bedside RN). Murray doesn't remember leaving a message for SW or what his questions were.   Education provided by SW: SW availability   Plan:    Discharge Plans in Progress: pending medical stability    Barriers to d/c plan: heart transplanted 6/2018, new colostomy yesterday, on dialysis     Follow up Plan: DINH will continue to follow for support as needed.    Pager [phone]     hard copy, drawn during this pregnancy

## 2021-08-18 ENCOUNTER — LAB (OUTPATIENT)
Dept: LAB | Facility: CLINIC | Age: 66
End: 2021-08-18
Attending: INTERNAL MEDICINE
Payer: MEDICARE

## 2021-08-18 DIAGNOSIS — Z79.899 ENCOUNTER FOR LONG-TERM CURRENT USE OF MEDICATION: ICD-10-CM

## 2021-08-18 DIAGNOSIS — Z48.298 AFTERCARE FOLLOWING ORGAN TRANSPLANT: ICD-10-CM

## 2021-08-18 DIAGNOSIS — Z94.0 KIDNEY REPLACED BY TRANSPLANT: ICD-10-CM

## 2021-08-18 LAB
ANION GAP SERPL CALCULATED.3IONS-SCNC: 8 MMOL/L (ref 3–14)
BUN SERPL-MCNC: 16 MG/DL (ref 7–30)
CALCIUM SERPL-MCNC: 9.9 MG/DL (ref 8.5–10.1)
CHLORIDE BLD-SCNC: 108 MMOL/L (ref 94–109)
CO2 SERPL-SCNC: 26 MMOL/L (ref 20–32)
CREAT SERPL-MCNC: 1.1 MG/DL (ref 0.66–1.25)
ERYTHROCYTE [DISTWIDTH] IN BLOOD BY AUTOMATED COUNT: 13.2 % (ref 10–15)
GFR SERPL CREATININE-BSD FRML MDRD: 70 ML/MIN/1.73M2
GLUCOSE BLD-MCNC: 168 MG/DL (ref 70–99)
HCT VFR BLD AUTO: 39.7 % (ref 40–53)
HGB BLD-MCNC: 12.6 G/DL (ref 13.3–17.7)
MCH RBC QN AUTO: 29.4 PG (ref 26.5–33)
MCHC RBC AUTO-ENTMCNC: 31.7 G/DL (ref 31.5–36.5)
MCV RBC AUTO: 93 FL (ref 78–100)
PLATELET # BLD AUTO: 200 10E3/UL (ref 150–450)
POTASSIUM BLD-SCNC: 4.3 MMOL/L (ref 3.4–5.3)
RBC # BLD AUTO: 4.29 10E6/UL (ref 4.4–5.9)
SODIUM SERPL-SCNC: 142 MMOL/L (ref 133–144)
TACROLIMUS BLD-MCNC: 6.5 UG/L (ref 5–15)
TME LAST DOSE: NORMAL H
TME LAST DOSE: NORMAL H
WBC # BLD AUTO: 4.9 10E3/UL (ref 4–11)

## 2021-08-18 PROCEDURE — 36415 COLL VENOUS BLD VENIPUNCTURE: CPT | Performed by: PATHOLOGY

## 2021-08-18 PROCEDURE — 80048 BASIC METABOLIC PNL TOTAL CA: CPT | Performed by: PATHOLOGY

## 2021-08-18 PROCEDURE — 85027 COMPLETE CBC AUTOMATED: CPT | Performed by: PATHOLOGY

## 2021-08-18 PROCEDURE — 87799 DETECT AGENT NOS DNA QUANT: CPT | Performed by: INTERNAL MEDICINE

## 2021-08-18 PROCEDURE — 80197 ASSAY OF TACROLIMUS: CPT | Performed by: INTERNAL MEDICINE

## 2021-08-19 ENCOUNTER — OFFICE VISIT (OUTPATIENT)
Dept: FAMILY MEDICINE | Facility: CLINIC | Age: 66
End: 2021-08-19
Payer: MEDICARE

## 2021-08-19 VITALS
RESPIRATION RATE: 16 BRPM | SYSTOLIC BLOOD PRESSURE: 147 MMHG | DIASTOLIC BLOOD PRESSURE: 90 MMHG | WEIGHT: 186 LBS | TEMPERATURE: 97 F | OXYGEN SATURATION: 100 % | HEART RATE: 91 BPM | HEIGHT: 68 IN | BODY MASS INDEX: 28.19 KG/M2

## 2021-08-19 DIAGNOSIS — R35.1 NOCTURIA: ICD-10-CM

## 2021-08-19 DIAGNOSIS — D47.2 MGUS (MONOCLONAL GAMMOPATHY OF UNKNOWN SIGNIFICANCE): ICD-10-CM

## 2021-08-19 DIAGNOSIS — I15.1 HTN, KIDNEY TRANSPLANT RELATED: ICD-10-CM

## 2021-08-19 DIAGNOSIS — N25.81 SECONDARY RENAL HYPERPARATHYROIDISM (H): ICD-10-CM

## 2021-08-19 DIAGNOSIS — G47.33 OSA (OBSTRUCTIVE SLEEP APNEA): ICD-10-CM

## 2021-08-19 DIAGNOSIS — Z00.00 ENCOUNTER FOR MEDICARE ANNUAL WELLNESS EXAM: Primary | ICD-10-CM

## 2021-08-19 DIAGNOSIS — E78.5 DYSLIPIDEMIA: ICD-10-CM

## 2021-08-19 DIAGNOSIS — Z23 NEED FOR 23-POLYVALENT PNEUMOCOCCAL POLYSACCHARIDE VACCINE: ICD-10-CM

## 2021-08-19 DIAGNOSIS — I71.21 ASCENDING AORTIC ANEURYSM (H): ICD-10-CM

## 2021-08-19 DIAGNOSIS — K21.9 GASTROESOPHAGEAL REFLUX DISEASE, UNSPECIFIED WHETHER ESOPHAGITIS PRESENT: ICD-10-CM

## 2021-08-19 DIAGNOSIS — Z87.19 HISTORY OF HERNIA REPAIR: ICD-10-CM

## 2021-08-19 DIAGNOSIS — N18.30 ANEMIA IN STAGE 3 CHRONIC KIDNEY DISEASE, UNSPECIFIED WHETHER STAGE 3A OR 3B CKD (H): ICD-10-CM

## 2021-08-19 DIAGNOSIS — Z94.1 HEART REPLACED BY TRANSPLANT (H): ICD-10-CM

## 2021-08-19 DIAGNOSIS — Z94.0 KIDNEY REPLACED BY TRANSPLANT: ICD-10-CM

## 2021-08-19 DIAGNOSIS — E11.29 TYPE 2 DIABETES MELLITUS WITH OTHER DIABETIC KIDNEY COMPLICATION, WITHOUT LONG-TERM CURRENT USE OF INSULIN (H): ICD-10-CM

## 2021-08-19 DIAGNOSIS — R19.00 ABDOMINAL WALL BULGE: ICD-10-CM

## 2021-08-19 DIAGNOSIS — H91.90 DECREASED HEARING, UNSPECIFIED LATERALITY: ICD-10-CM

## 2021-08-19 DIAGNOSIS — N52.9 ERECTILE DYSFUNCTION, UNSPECIFIED ERECTILE DYSFUNCTION TYPE: ICD-10-CM

## 2021-08-19 DIAGNOSIS — Z48.298 AFTERCARE FOLLOWING ORGAN TRANSPLANT: ICD-10-CM

## 2021-08-19 DIAGNOSIS — M25.642 STIFFNESS OF JOINTS OF BOTH HANDS: ICD-10-CM

## 2021-08-19 DIAGNOSIS — E83.42 HYPOMAGNESEMIA: ICD-10-CM

## 2021-08-19 DIAGNOSIS — R93.89 ABNORMAL FINDINGS ON DIAGNOSTIC IMAGING OF OTHER SPECIFIED BODY STRUCTURES: ICD-10-CM

## 2021-08-19 DIAGNOSIS — M25.641 STIFFNESS OF JOINTS OF BOTH HANDS: ICD-10-CM

## 2021-08-19 DIAGNOSIS — Z94.0 HTN, KIDNEY TRANSPLANT RELATED: ICD-10-CM

## 2021-08-19 DIAGNOSIS — K86.2 PANCREATIC CYST: ICD-10-CM

## 2021-08-19 DIAGNOSIS — D63.1 ANEMIA IN STAGE 3 CHRONIC KIDNEY DISEASE, UNSPECIFIED WHETHER STAGE 3A OR 3B CKD (H): ICD-10-CM

## 2021-08-19 DIAGNOSIS — Z98.890 HISTORY OF HERNIA REPAIR: ICD-10-CM

## 2021-08-19 DIAGNOSIS — D84.9 IMMUNOSUPPRESSION (H): ICD-10-CM

## 2021-08-19 DIAGNOSIS — Z00.00 HEALTH CARE MAINTENANCE: ICD-10-CM

## 2021-08-19 DIAGNOSIS — J30.9 ALLERGIC RHINITIS, UNSPECIFIED SEASONALITY, UNSPECIFIED TRIGGER: ICD-10-CM

## 2021-08-19 DIAGNOSIS — E55.9 VITAMIN D DEFICIENCY: ICD-10-CM

## 2021-08-19 PROBLEM — M79.642 BILATERAL HAND PAIN: Status: RESOLVED | Noted: 2021-06-01 | Resolved: 2021-08-19

## 2021-08-19 PROBLEM — M79.641 BILATERAL HAND PAIN: Status: RESOLVED | Noted: 2021-06-01 | Resolved: 2021-08-19

## 2021-08-19 PROBLEM — K57.32 SIGMOID DIVERTICULITIS: Status: RESOLVED | Noted: 2018-08-14 | Resolved: 2021-08-19

## 2021-08-19 LAB — BKV DNA # SPEC NAA+PROBE: NOT DETECTED COPIES/ML

## 2021-08-19 PROCEDURE — G0439 PPPS, SUBSEQ VISIT: HCPCS | Performed by: FAMILY MEDICINE

## 2021-08-19 PROCEDURE — 99207 PR FOOT EXAM NO CHARGE: CPT | Performed by: FAMILY MEDICINE

## 2021-08-19 PROCEDURE — G0009 ADMIN PNEUMOCOCCAL VACCINE: HCPCS | Performed by: FAMILY MEDICINE

## 2021-08-19 PROCEDURE — 90732 PPSV23 VACC 2 YRS+ SUBQ/IM: CPT | Performed by: FAMILY MEDICINE

## 2021-08-19 PROCEDURE — 99214 OFFICE O/P EST MOD 30 MIN: CPT | Mod: 25 | Performed by: FAMILY MEDICINE

## 2021-08-19 RX ORDER — FAMOTIDINE 20 MG/1
20 TABLET, FILM COATED ORAL 2 TIMES DAILY
Qty: 180 TABLET | Refills: 3 | Status: SHIPPED | OUTPATIENT
Start: 2021-08-19 | End: 2022-05-16

## 2021-08-19 ASSESSMENT — ENCOUNTER SYMPTOMS
MYALGIAS: 0
HEARTBURN: 0
CONSTIPATION: 0
EYE PAIN: 0
SHORTNESS OF BREATH: 0
HEMATURIA: 0
DIARRHEA: 0
JOINT SWELLING: 0
PALPITATIONS: 0
DIZZINESS: 0
FEVER: 0
CHILLS: 0
WEAKNESS: 0
FREQUENCY: 0
ABDOMINAL PAIN: 0
COUGH: 0
HEMATOCHEZIA: 0
SORE THROAT: 0
NERVOUS/ANXIOUS: 0
PARESTHESIAS: 0
HEADACHES: 0
NAUSEA: 0
ARTHRALGIAS: 0
DYSURIA: 0

## 2021-08-19 ASSESSMENT — MIFFLIN-ST. JEOR: SCORE: 1598.19

## 2021-08-19 ASSESSMENT — ACTIVITIES OF DAILY LIVING (ADL): CURRENT_FUNCTION: NO ASSISTANCE NEEDED

## 2021-08-19 NOTE — PROGRESS NOTES
"SUBJECTIVE:   Murray Nicholson is a 66 year old male who presents for Preventive Visit.    Patient has been advised of split billing requirements and indicates understanding: Yes   Are you in the first 12 months of your Medicare coverage?  No  Healthy Habits:     In general, how would you rate your overall health?  Good    Frequency of exercise:  4-5 days/week    Duration of exercise:  Greater than 60 minutes    Do you usually eat at least 4 servings of fruit and vegetables a day, include whole grains    & fiber and avoid regularly eating high fat or \"junk\" foods?  No    Taking medications regularly:  Yes    Medication side effects:  None    Ability to successfully perform activities of daily living:  No assistance needed    Home Safety:  No safety concerns identified    Hearing Impairment:  Difficulty following a conversation in a noisy restaurant or crowded room and need to ask people to speak up or repeat themselves    In the past 6 months, have you been bothered by leaking of urine?  No    In general, how would you rate your overall mental or emotional health?  Excellent      PHQ-2 Total Score: 0    Additional concerns today:  Yes  Do you feel safe in your environment? Yes  Have you ever done Advance Care Planning? (For example, a Health Directive, POLST, or a discussion with a medical provider or your loved ones about your wishes): Yes, patient states has an Advance Care Planning document and will bring a copy to the clinic.  Fall risk  Fallen 2 or more times in the past year?: No  Any fall with injury in the past year?: No  click delete button to remove this line now  Cognitive Screening   1) Repeat 3 items (Leader, Season, Table)    2) Clock draw: NORMAL  3) 3 item recall: Recalls 2 objects   Results: NORMAL clock, 1-2 items recalled: COGNITIVE IMPAIRMENT LESS LIKELY  Mini-CogTM Copyright NAMAN Bell. Licensed by the author for use in [facility]; reprinted with permission (caron@.[facility]). All rights " reserved.    Do you have sleep apnea, excessive snoring or daytime drowsiness?: no    Reviewed and updated as needed this visit by clinical staff  Tobacco  Allergies  Meds   Med Hx  Surg Hx  Fam Hx  Soc Hx        Former smoker, hx of DM on insulin managed by endo, HTN, HLD, SHEELA not on a CPAP, hx of CAD, HFpEF, aortic stenosis, ascending aortic aneurysm, s/p heart transplant in 2018, multiple heart biopsies, cardiac caths, under care of cardiology, CKD, ESRD, on peritoneal dialysis, s/p av fistula, Hemodialysis, & dialysis related procedures, resolved low magnesium, resolved secondary hyperparathyroidism, s/p kidney transplant in 2019, under care of nephrology transplant team, on  Chronic immunosuppression, hx of anemia of chronic disease, vit d deficiency, allergic rhinitis, hx of GERD previously on PPI, pancreatic cyst, sigmoid diverticulosis, prior diverticulitis s/p EUA rectum, lap assist colostomy & take down in 2018 & then ileostomy followed by take down 2019, multiple EGD, colonoscopy, flex sigs, s/p B/l lap hernia repair, hx of VRE, decreased hearing, with seasonal allergies, allergic to cats, shrimp, norco, isosorbide & PCN, under care of cardiology, endocrine, transplant team, previously under care of PCP Dr Knutson. 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 Recently got back from a trip to [place].     Here for a physical. New to this provider   Feels good.  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 Last check up in June with Dr Perkins, told was fine  Kidney replaced in 2019. Gets regular labs. Ur cr and protein normal on 7/6/21.uric acid 6.8 in 6/2020. Bmp 7/2021. Tac level within range  On chronic immunosuppression with tacrolimus and Cellcept  DM on insulin & metformin under care of endo, has an upcoming apt with them. FS : 112 to 117. HBA1c was 6.2 recently   BMI 28 trying to lose weight.  Currently stable. Since lost weight has no more back pain. Walked 12 miles a day when in [place] recently. 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   SHEELA not on CPAP, lost a lot of weight, urinates at night, lives alone. In past didn't think he snored  A girlfriend recently said he didn't snore much.  Declines need for a sleep study.  Secondary hyperparathyroidism / Hx of gout , feels no longer has this since got his heart  & lost weight and No longer on bicarb since got new kidney.   BP a bit high lately. BP not high when seen by cardio in June. Will check in with his transplant/ cardiology team. Notes had gone on a holiday July 13th and stopped taking vitas till he got back aug 7th. Also Got a new cup from the [employer] and drinking more coffee than usual as it is a huge cup.   ? Ascending aorta aneurysm noted in problem list, seemed not aware of this, ? Not clear of diagnosis. U/s 9/2019 showed normal aorta to femoral aretreies. He will check with his cardiologist  MABLE hx not sure if seeing hematology or followed by his transplant team, will ask them  Dyslipidemia on statin  Anemia of CKD stable  hypomagnesemia on mag by transplant  GERD previously on PPI, transplant pharmacist gave him famotidine to take instead. But not working as well as pantoprazole so stopped famotidine but then ran out of pantoprazole and now taking OTC omeprazole. Would like a script for the Pantoprazole.   Pancreatic cyst , note being monitored. Mri abd last in 2020  Hx of vit d def in past  Stiffness of joints in hands. Same not worse. Cancelled apt with OT, Doing exercises helps.   Nails got soft and breaking so taking biotin, think it helps, using clear nail polish, & bioton helps hair, felt lost some hair  ED wondering about OTC herbal testosterone supplements  Urinates often at night/ nocturia, smart watch charts his sleeping    Has a lower abdominal wall bulge after helping a man who fell who was  deadweight while trying to lift him up. Feels popped prior mesh related inguinal hernia. No pain. More prominent when standing. Goes down when lying down.     Went to [place]. Visited an  Actress friend. He cant hear from his left ear. His girlfriend cant hear from her right ear. She told him she had to get insurance to get her optican cross transmittor PA. He will discuss that with audiology. He also felt he could hear better in [place] as people spoke louder. Feels augmentation of sound device would be helpful. Would help as an actor knows projecting voice helps.     Health care maintenance reviewed  Has a living will    Reviewed and updated as needed this visit by Provider  Tobacco  Allergies  Meds     Fam Hx         Social History     Tobacco Use     Smoking status: Former Smoker     Packs/day: 1.00     Years: 20.00     Pack years: 20.00     Types: Cigarettes     Start date: 1984     Quit date: 1994     Years since quittin.0     Smokeless tobacco: Never Used   Substance Use Topics     Alcohol use: Yes     Comment: occasionally     If you drink alcohol do you typically have >3 drinks per day or >7 drinks per week? No    Alcohol Use 2021   Prescreen: >3 drinks/day or >7 drinks/week? No   Prescreen: >3 drinks/day or >7 drinks/week? -   No flowsheet data found.    Current providers sharing in care for this patient include:   Patient Care Team:  Emilia Knutson MD as PCP - General (Family Practice)  Arcelia Blum MD as MD (Cardiology)  Kristi Armas PA as Physician Assistant (Physician Assistant)  Jaky Canela as Clinic Care Coordinator  Ana Luisa Mcpherson MD as MD (Nephrology)  Lillie Ulloa RN as Transplant Coordinator  [facility] ([facility] ([facility] ([place]))  Abram Moulton MD as MD (Family Practice)  Eduardo Lea [facility] as Pharmacist (Pharmacist)  Mariangel Merida MD as MD (INTERNAL MEDICINE - ENDOCRINOLOGY, DIABETES &  METABOLISM)  Mariangel Merida MD as Assigned Endocrinology Provider  Noel Clemons DPM as Assigned Musculoskeletal Provider  Ana Luisa Coleman APRN CNP as Assigned PCP  Maliha Jesus NP as Assigned Surgical Provider  Klever Ernst MD as Assigned Heart and Vascular Provider    The following health maintenance items are reviewed in Epic and correct as of today:  Health Maintenance Due   Topic Date Due     EYE EXAM  06/06/2018     Lab work is in process  Labs reviewed in EPIC  BP Readings from Last 3 Encounters:   08/19/21 (!) 147/90   06/14/21 128/88   06/07/21 (!) 144/87    Wt Readings from Last 3 Encounters:   08/19/21 84.4 kg (186 lb)   06/14/21 85.3 kg (188 lb 1.6 oz)   06/07/21 83 kg (183 lb)                  Patient Active Problem List   Diagnosis     Esophageal reflux     Allergic rhinitis     Anemia in chronic renal disease     Dyslipidemia     MGUS (monoclonal gammopathy of unknown significance)     Ascending aortic aneurysm (H)     Heart replaced by transplant (H)     Immunosuppression (H)     Type 2 diabetes mellitus (H)     Pancreatic cyst     Kidney replaced by transplant     Aftercare following organ transplant     HTN, kidney transplant related     Secondary renal hyperparathyroidism (H)     Vitamin D deficiency     Hypomagnesemia     SHEELA on CPAP     Need for pneumocystis prophylaxis     Stiffness of joints of both hands     Erectile dysfunction, unspecified erectile dysfunction type     Nocturia     History of hernia repair     Decreased hearing, unspecified laterality     Past Surgical History:   Procedure Laterality Date     CARDIAC SURGERY       COLONOSCOPY N/A 5/3/2018    Procedure: COLONOSCOPY;  colonoscopy ;  Surgeon: Ammon Castillo MD;  [facility]     CREATE FISTULA ARTERIOVENOUS UPPER EXTREMITY BOVINE Left 5/8/2019    Procedure: Left Upper Extremity Arteriovenous Bovine Graft Creation;  Surgeon: Calin Cheney MD;  [facility]     CV CORONARY ANGIOGRAM N/A  6/28/2019    Procedure: CV CORONARY ANGIOGRAM;  Surgeon: Montrell Posada MD;  [facility]     CV CORONARY ANGIOGRAM N/A 7/15/2020    Procedure: CV CORONARY ANGIOGRAM;  Surgeon: Marcelo Ramírez MD;  [facility]     CV CORONARY ANGIOGRAM N/A 6/7/2021    Procedure: CV CORONARY ANGIOGRAM;  Surgeon: Gilberto Mccann MD;  [facility]     CV HEART BIOPSY N/A 2/1/2019    Procedure: HBX;  Surgeon: Montrell Posada MD;  [facility]     CV HEART BIOPSY N/A 3/22/2019    Procedure: HBX, RIJV ACCESS;  Surgeon: Jordan Fox MD;  [facility]     CV HEART BIOPSY N/A 6/28/2019    Procedure: CV HEART BIOPSY;  Surgeon: Montrell Posada MD;  Location:  HEART CARDIAC CATH LAB     CV HEART BIOPSY N/A 10/28/2019    Procedure: CV HEART BIOPSY;  Surgeon: Marcelo Ramírez MD;  [facility]     CV HEART BIOPSY N/A 2/6/2020    Procedure: CV HEART BIOPSY;  Surgeon: Montrell Posada MD;  [facility]     CV HEART BIOPSY N/A 7/15/2020    Procedure: CV HEART BIOPSY;  Surgeon: Marcelo Ramírez MD;  Location:  HE[facility] CARDIAC CATH LAB     CV RIGHT HEART CATH MEASUREMENTS RECORDED N/A 6/28/2019    Procedure: CV RIGHT HEART CATH;  Surgeon: Montrell Posada MD;  [facility]     CV RIGHT HEART CATH MEASUREMENTS RECORDED N/A 7/15/2020    Procedure: CV RIGHT HEART CATH;  Surgeon: Marcelo Ramírez MD;  Location:  HE[facility] CARDIAC CATH LAB     CV RIGHT HEART CATH MEASUREMENTS RECORDED N/A 6/7/2021    Procedure: CV RIGHT HEART CATH;  Surgeon: Gilberto Mccann MD;  Location:  HEART CARDIAC CATH LAB     ESOPHAGOSCOPY, GASTROSCOPY, DUODENOSCOPY (EGD), COMBINED N/A 5/7/2018    Procedure: COMBINED ENDOSCOPIC ULTRASOUND, ESOPHAGOSCOPY, GASTROSCOPY, DUODENOSCOPY (EGD), FINE NEEDLE ASPIRATE/BIOPSY;  Endoscopic Ultrasound with  Fine Needle Aspiration ;  Surgeon: Alon Don MD;  [facility]     EXAM UNDER ANESTHESIA RECTUM N/A 8/12/2018    Procedure: EXAM UNDER ANESTHESIA RECTUM;  EXAM UNDER ANESTHESIA RECTUM ,COMBINED INCISION AND DRAINAGE OF RECTAL ABCESS ;  Surgeon: Rick Tran MD;  [facility]     INCISION AND DRAINAGE RECTUM, COMBINED N/A 8/12/2018    Procedure: COMBINED INCISION AND DRAINAGE RECTUM;;  Surgeon: Rick Tran MD;  [facility]     IR DIALYSIS FISTULOGRAM LEFT  9/25/2019     IR DIALYSIS FISTULOGRAM LEFT  11/22/2019     IR DIALYSIS PTA  9/25/2019     IR DIALYSIS PTA  11/22/2019     LAPAROSCOPIC ASSISTED COLOSTOMY TAKEDOWN N/A 12/11/2018    Procedure: Laparoscopic Assisted Colostomy Takedown, Laparoscopic Lysis of Adhesions;  Surgeon: Rick Tran MD;  [facility]     LAPAROSCOPIC ASSISTED SIGMOID COLECTOMY N/A 8/14/2018    Procedure: LAPAROSCOPIC ASSISTED SIGMOID COLECTOMY;  Laparoscopic Hand Assisted Takedown of Splenic Flexure, Sigmoidectomy, Small Bowel Resection, Takedown of Small Bowel to Colon Fistula;  Surgeon: Rick Tran MD;  [facility]     LAPAROSCOPIC HERNIORRHAPHY INGUINAL BILATERAL Bilateral 7/24/2015    Procedure: LAPAROSCOPIC HERNIORRHAPHY INGUINAL BILATERAL;  Surgeon: Bobby Mcconnell MD;  [facility]     LAPAROSCOPIC INSERTION CATHETER PERITONEAL DIALYSIS N/A 6/22/2017    Procedure: LAPAROSCOPIC INSERTION CATHETER PERITONEAL DIALYSIS;  Laparoscopic Peritoneal Dialysis Catheter Placement - Anesthesia with block;  Surgeon: Esteban Arvizu MD;  [facility]     PICC INSERTION Left 04/22/2018    5Fr - 49cm (3cm external), Basilic vein, low SVC     REMOVE CATHETER PERITONEAL Right 1/15/2018    Procedure: REMOVE CATHETER PERITONEAL;  Open Removal of Peritoneal Dialysis Catheter ;  Surgeon: Esteban Arvizu MD;  [facility]     SIGMOIDOSCOPY FLEXIBLE N/A 11/21/2018    Procedure: Examination Under Anesthesia,  Flexible Sigmoidoscopy and Polypectomy;  Surgeon: Rick Tran MD;  [facility]     SIGMOIDOSCOPY FLEXIBLE N/A 2018    Procedure: Flexible Sigmoidoscopy;  Surgeon: Rick Tran MD;  [facility]     TAKEDOWN ILEOSTOMY N/A 3/27/2019    Procedure: Takedown Ileostomy;  Surgeon: Rick Tran MD;  [facility]     TRANSPLANT HEART RECIPIENT N/A 2018    Procedure: TRANSPLANT HEART RECIPIENT;  Median Sternotomy, on-pump oxygenator, Heart Transplant;  Surgeon: Rony Caputo MD;  [facility]     TRANSPLANT KIDNEY RECIPIENT LIVING UNRELATED  2019       Social History     Tobacco Use     Smoking status: Former Smoker     Packs/day: 1.00     Years: 20.00     Pack years: 20.00     Types: Cigarettes     Start date: 1984     Quit date: 1994     Years since quittin.0     Smokeless tobacco: Never Used   Substance Use Topics     Alcohol use: Yes     Comment: occasionally     Family History   Problem Relation Age of Onset     C.A.D. Father          from-never knew father-age 60     Diabetes Father      Cerebrovascular Disease Father      Hypertension Father      Hypertension No family hx of      Breast Cancer No family hx of      Cancer - colorectal No family hx of      Prostate Cancer No family hx of      Kidney Disease No family hx of      Melanoma No family hx of      Skin Cancer No family hx of          Current Outpatient Medications   Medication Sig Dispense Refill     aspirin 81 MG EC tablet Take 81 mg by mouth daily       atorvastatin (LIPITOR) 40 MG tablet Take 1 tablet (40 mg) by mouth daily 90 tablet 3     biotin 1000 MCG TABS tablet Take 1,000 mcg by mouth daily Patient takes every other day       blood glucose (ACCU-CHEK GUIDE) test strip Use to test blood sugar 3 times daily or as directed. 300 strip 3     blood glucose monitoring (ACCU-CHEK FASTCLIX) lancets USE TO TEST 3 TIMES DAILY OR AS DIRECTED. 300 each 3     calcium  citrate and vitamin D (CITRACAL) 200-250 MG-UNIT TABS per tablet Take 1 tablet by mouth 2 times daily       famotidine (PEPCID) 20 MG tablet Take 1 tablet (20 mg) by mouth 2 times daily 180 tablet 3     fluticasone (FLONASE) 50 MCG/ACT nasal spray SHAKE LIQUID AND USE 1 SPRAY IN EACH NOSTRIL DAILY 16 g 11     loratadine (CLARITIN) 10 MG tablet Take 10 mg by mouth daily Patient takes at bedtime       metFORMIN (GLUCOPHAGE-XR) 500 MG 24 hr tablet Take 4 tablets (2,000 mg) by mouth daily (with breakfast) 360 tablet 3     multivitamin (CENTRUM SILVER) tablet Take 1 tablet by mouth daily       mycophenolate (GENERIC EQUIVALENT) 250 MG capsule Take 3 capsules (750 mg) by mouth 2 times daily 180 capsule 11     sulfamethoxazole-trimethoprim (BACTRIM) 400-80 MG tablet Take 1 tablet by mouth daily 30 tablet 11     tacrolimus (GENERIC EQUIVALENT) 0.5 MG capsule Take ONE cap every PM (0.5 mg every evening) 6030 capsule 11     tacrolimus (GENERIC EQUIVALENT) 1 MG capsule Take ONE cap every AM (1 mg every morning) 30 capsule 11     Zinc Sulfate (ZINC 15 PO)        Allergies   Allergen Reactions     Norco [Hydrocodone-Acetaminophen] Nausea and Vomiting     Cats      Throat tightness     Isosorbide Other (See Comments)     hypotension     Penicillins Hives     Seasonal Allergies      rhinitis     Shrimp      Throat closes      Recent Labs   Lab Test 08/18/21  0935 07/06/21  1003 06/10/21  1056 06/07/21  0834 12/09/20  0907 09/14/20  0933 07/15/20  0900 04/17/18  0559 04/16/18  1546 03/07/18  0833 02/21/18  0900   A1C  --   --   --  6.2*  --  6.1* 6.3*   < >  --    < >  --    LDL  --   --   --  28 26  --  23   < > 60  --   --    HDL  --   --   --  49 47  --  42   < > 46  --   --    TRIG  --   --   --  215* 214*  --  217*   < > 233*  --   --    ALT  --   --   --  28 36  --  34   < > 22  --   --    CR 1.10 1.09 1.01 1.04 1.11 1.03 1.14  --  8.81*  --  5.79*   GFRESTIMATED 70 70 77 74 69 76 67  --  6*  --  10*   GFRESTBLACK  --  81 89  "86 80 88 78  --  7*  --  12*   POTASSIUM 4.3 3.9 4.1 4.2 3.9 4.0 4.1   < > 5.0  --  4.0   TSH  --   --   --   --   --   --   --   --  2.25  --  3.59    < > = values in this interval not displayed.     Review of Systems   Constitutional: Negative for chills and fever.   HENT: Negative for congestion, ear pain, hearing loss and sore throat.    Eyes: Negative for pain and visual disturbance.   Respiratory: Negative for cough and shortness of breath.    Cardiovascular: Negative for chest pain, palpitations and peripheral edema.   Gastrointestinal: Negative for abdominal pain, constipation, diarrhea, heartburn, hematochezia and nausea.   Genitourinary: Positive for impotence. Negative for discharge, dysuria, frequency, genital sores, hematuria and urgency.   Musculoskeletal: Negative for arthralgias, joint swelling and myalgias.   Skin: Negative for rash.   Neurological: Negative for dizziness, weakness, headaches and paresthesias.   Psychiatric/Behavioral: Negative for mood changes. The patient is not nervous/anxious.      Constitutional, HEENT, cardiovascular, pulmonary, GI, , musculoskeletal, neuro, skin, endocrine and psych systems are negative, except as otherwise noted.    OBJECTIVE:   BP (!) 147/90 (BP Location: Right arm, Patient Position: Chair, Cuff Size: Adult Regular)   Pulse 91   Temp 97  F (36.1  C) (Tympanic)   Resp 16   Ht 1.727 m (5' 8\")   Wt 84.4 kg (186 lb)   SpO2 100%   BMI 28.28 kg/m   Estimated body mass index is 28.28 kg/m  as calculated from the following:    Height as of this encounter: 1.727 m (5' 8\").    Weight as of this encounter: 84.4 kg (186 lb).  Physical Exam  GENERAL: healthy, alert, no distress and over weight  EYES: Eyes grossly normal to inspection, PERRL and conjunctivae and sclerae normal  HENT: ear canals and TM's normal, nose and mouth without ulcers or lesions  NECK: no adenopathy, no asymmetry, masses, or scars and thyroid normal to palpation  RESP: lungs clear to " auscultation - no rales, rhonchi or wheezes  CV: regular rate and rhythm, normal S1 S2, no S3 or S4, no murmur, click or rub, no peripheral edema and peripheral pulses strong, midline sternotomy scar.  ABDOMEN: soft, non tender, no hepatosplenomegaly, no masses and bowel sounds normal,lap scars and right diagonal scar RLW   (male): hernia bilateral midline pubic area bulge  MS: no gross musculoskeletal defects noted, no edema  SKIN: no suspicious lesions or rashes  NEURO: Normal strength and tone, mentation intact and speech normal  PSYCH: mentation appears normal, affect normal/bright  Diabetic foot exam: normal DP and PT pulses, no trophic changes or ulcerative lesions, normal sensory exam and nail exam normal nails without lesions    Diagnostic Test Results:  Labs reviewed in Epic  No results found for any visits on 08/19/21.    ASSESSMENT / PLAN:       ICD-10-CM    1. Encounter for Medicare annual wellness exam  Z00.00 Pneumococcal vaccine 23 valent PPSV23  (Pneumovax) [39369]   2. Heart replaced by transplant (H)  Z94.1    3. Kidney replaced by transplant  Z94.0    4. Aftercare following organ transplant  Z48.298    5. Immunosuppression (H)  D84.9    6. Type 2 diabetes mellitus with other diabetic kidney complication, without long-term current use of insulin (H)  E11.29 FOOT EXAM   7. SHEELA (obstructive sleep apnea)  G47.33    8. Secondary renal hyperparathyroidism (H)  N25.81    9. HTN, kidney transplant related  I15.1 TSH with free T4 reflex    Z94.0    10. Ascending aortic aneurysm (H)  I71.2    11. MGUS (monoclonal gammopathy of unknown significance)  D47.2    12. Dyslipidemia  E78.5    13. Anemia in stage 3 chronic kidney disease, unspecified whether stage 3a or 3b CKD  N18.30     D63.1    14. Hypomagnesemia  E83.42    15. Gastroesophageal reflux disease, unspecified whether esophagitis present  K21.9 famotidine (PEPCID) 20 MG tablet   16. Pancreatic cyst  K86.2    17. Vitamin D deficiency  E55.9 Vitamin D  Deficiency   18. Stiffness of joints of both hands  M25.641     M25.642    19. Allergic rhinitis, unspecified seasonality, unspecified trigger  J30.9    20. Erectile dysfunction, unspecified erectile dysfunction type  N52.9    21. Nocturia  R35.1    22. Abdominal wall bulge  R19.00 Adult General Surg Referral   23. History of hernia repair  Z98.890 Adult General Surg Referral    Z87.19    24. Decreased hearing, unspecified laterality  H91.90    25. Need for 23-polyvalent pneumococcal polysaccharide vaccine  Z23 Pneumococcal vaccine 23 valent PPSV23  (Pneumovax) [[id number]]   26. Abnormal findings on diagnostic imaging of other specified body structures   R93.89 TSH with free T4 reflex   27. Health care maintenance  Z00.00 REVIEW OF HEALTH MAINTENANCE PROTOCOL ORDERS     Seen for preventive health and additional concerns today   Self testicular check regularly   Labs today and will make further recommendations once reviewed  Health care maintenance reviewed  DM eye check  DM foot exam done today  Chronic use of Prilosec/pantoprazole like med's can cause atypical pneumonia, fractures,  C diff colitis, vit B 12 and magnesium deficiency. If can come off would be good. Best way to do it is to start Pepcid ( famotidine)  20 mg Over the counter twice a day and then after few weeks on it start tapering off Prilosec until off it completely while continuing Pepcid twice a day for symptoms control.   Got covid booster , call transplant coordinator  Flu shot in the fall  Fall precautions  Pneumovax given  today   Labs in June and July reviewed labs cbc, bmp, lipids, urine protein, HBA1c wnl  Stable anemia  Last TSH in 2018 normal , can recheck with next lab draw  uric acid normal in 6/2020, asymptomatic so not checked today  Continue care with cardiology, nephrology, transplant, endo, sleep, ENT, Audiology,   Discuss with cardiology and transplant teams dx of hyperparathyroidism and ascending aortic aneurysm, MGUS noted in problem  "list and what follow up if any needed  Reschedule MRI of heart  Follow u with specialist Dr Matias regarding hx of pancreatic cyst  Continue Claritin and Flonase for allergies  ED and nocturia related to age and med  PSA in June was normal  Be care ful with testosterone supplements as can raise BP  BP elevated. Advised to decrease Caffeine in diet, decrease salt intake. Weight loss will help, aim for a BMI less than 26  Contact his cardiologist if BP remains elevated  See ENT and audiology for decreased hearing left ear  See back in 1 yr for a physical     Patient has been advised of split billing requirements and indicates understanding: Yes  COUNSELING:  Reviewed preventive health counseling, as reflected in patient instructions       Consider AAA screening for ages 65-75 and smoking history. U/s aorta to femoral arteries noted normal on pre heart transplant u/s study in 2019.        Regular exercise       Healthy diet/nutrition       Vision screening       Hearing screening       Dental care       Bladder control       Fall risk prevention       Immunizations    Vaccinated for: Pneumococcal         Aspirin prophylaxis        Alcohol Use        Safe sex practices/STD prevention       Hepatitis C screening       HIV screening for high risk patient       Consider lung cancer screening for ages 55-80 years and 30 pack-year smoking history. Does not qualify as quit in 1994.        Colon cancer screening       Prostate cancer screening       The ASCVD Risk score (Damian AZIZA Jr., et al., 2013) failed to calculate for the following reasons:    The valid total cholesterol range is 130 to 320 mg/dL    Estimated body mass index is 28.28 kg/m  as calculated from the following:    Height as of this encounter: 1.727 m (5' 8\").    Weight as of this encounter: 84.4 kg (186 lb).    Weight management plan: Discussed healthy diet and exercise guidelines    He reports that he quit smoking about 27 years ago. His smoking use included " cigarettes. He started smoking about 37 years ago. He has a 20.00 pack-year smoking history. He has never used smokeless tobacco.      Appropriate preventive services were discussed with this patient, including applicable screening as appropriate for cardiovascular disease, diabetes, osteopenia/osteoporosis, and glaucoma.  As appropriate for age/gender, discussed screening for colorectal cancer, prostate cancer, breast cancer, and cervical cancer. Checklist reviewing preventive services available has been given to the patient.    Reviewed patients plan of care and provided an AVS. The Complex Care Plan (for patients with higher acuity and needing more deliberate coordination of services) for [place] meets the Care Plan requirement. This Care Plan has been established and reviewed with the Patient.    Counseling Resources:  ATP IV Guidelines  Pooled Cohorts Equation Calculator  Breast Cancer Risk Calculator  Breast Cancer: Medication to Reduce Risk  FRAX Risk Assessment  ICSI Preventive Guidelines  Dietary Guidelines for Americans, 2010  USDA's MyPlate  ASA Prophylaxis  Lung CA Screening    Marie Narvaez MD  [facility]    Identified Health Risks:

## 2021-08-19 NOTE — NURSING NOTE
Prior to immunization administration, verified patients identity using patient s name and date of birth. Please see Immunization Activity for additional information.     Screening Questionnaire for Adult Immunization    Are you sick today?   No   Do you have allergies to medications, food, a vaccine component or latex?   No   Have you ever had a serious reaction after receiving a vaccination?   No   Do you have a long-term health problem with heart, lung, kidney, or metabolic disease (e.g., diabetes), asthma, a blood disorder, no spleen, complement component deficiency, a cochlear implant, or a spinal fluid leak?  Are you on long-term aspirin therapy?   No   Do you have cancer, leukemia, HIV/AIDS, or any other immune system problem?   No   Do you have a parent, brother, or sister with an immune system problem?   No   In the past 3 months, have you taken medications that affect  your immune system, such as prednisone, other steroids, or anticancer drugs; drugs for the treatment of rheumatoid arthritis, Crohn s disease, or psoriasis; or have you had radiation treatments?   Yes   Have you had a seizure, or a brain or other nervous system problem?   No   During the past year, have you received a transfusion of blood or blood    products, or been given immune (gamma) globulin or antiviral drug?   No   For women: Are you pregnant or is there a chance you could become       pregnant during the next month?   No   Have you received any vaccinations in the past 4 weeks?   No     Immunization questionnaire was positive for at least one answer.  Notified Brice.        Per orders of Dr. Narvaez, injection of Pneumococcal 23 valent given by Elina Granado. Patient instructed to remain in clinic for 15 minutes afterwards, and to report any adverse reaction to me immediately.     Elina Granado on 8/19/2021 at 10:50 AM  Screening performed by Elina Granado on 8/19/2021 at 10:48 AM.

## 2021-08-19 NOTE — PATIENT INSTRUCTIONS
Seen for preventive health and additional concerns today   Self testicular check regularly   Labs today and will make further recommendations once reviewed  Health care maintenance reviewed  DM eye check  DM foot exam  Chronic use of Prilosec/pantoprazole   like med's can cause atypical pneumonia, fractures,  C diff colitis, vit B 12 and magnesium deficiency. If can come off would be good. Best way to to do it is to start Pepcd ( famotidine)  20 mg Over the counter twice a day and then after few weeks on it start tapering off Prilosec until off it completely while continuing Pepcid twice a day for symptoms control.   Got covid booster , call transplant coordinator  Flu shot in the fall  Fall precautions  Pneumovax today   Labs in June and July reviewed labs cbc, bmp, lipids, urine protein, HBA1c wnl  stabel anemia  Last tsh in 2018 normal , can recheck with next lab draw  uric acid normal in 6/2002, asymptomatic so not checked today  Continue care with cardiology, nephrology, transplant, endo, sleep, ENT, Audiology,   Discuss with cardiology and transplant teams dx of hyperparathyroidism and ascending aortic aneurysm, MGUS and what follow up needed  Reschedule mri of heart  Follow u with specialist Dr Matias regarding hx of pancreatic cyst  Continue claritin and flonase for allergies  ED and nocturia related to age and med  PSA in June was normal  Be care ful with testosterone supplements as can riase BP  Decrease Caffeine in diet, decrease in salt  Weight loss, BMI less than 26  Contact your cardiologist if remains elevated  See ENT and audiology for decreased hearing left ear  See you back in 1 yr for a physical     Patient Education   Personalized Prevention Plan  You are due for the preventive services outlined below.  Your care team is available to assist you in scheduling these services.  If you have already completed any of these items, please share that information with your care team to update in your  medical record.  Health Maintenance Due   Topic Date Due     ANNUAL REVIEW OF HM ORDERS  Never done     Eye Exam  06/06/2018     Pneumococcal Vaccine (4 of 4 - PPSV23) 01/07/2020     Annual Wellness Visit  04/26/2020     Diabetic Foot Exam  04/26/2020     FALL RISK ASSESSMENT  02/19/2021

## 2021-08-20 NOTE — PATIENT INSTRUCTIONS
We appreciate your assistance in coordinating your healthcare.     Please upload your insulin pump, blood sugar meter and/or continuous glucose monitor at home 1-2 days before your next diabetes-related appointment.   This will allow your provider to review your  data before your scheduled virtual visit.    To ask a question to your Endocrine care team, please send them a Myer message, or reach them by phone at [phone]     To expedite your medication refill(s), please contact your pharmacy and have them   fax a refill request to: [phone].  *Please allow 3 business days for routine medication refills.  *Please allow 5 business days for controlled substance medication refills.    For after-hours urgent Endocrine issues, that do not require 371, please dial [phone], and ask to speak with the Endocrinologist On-Call

## 2021-08-20 NOTE — PROGRESS NOTES
dm 2  Minerva Javier CMA    Murray is a 66 year old who is being evaluated via a billable video visit.      How would you like to obtain your AVS? MyChart  If the video visit is dropped, the invitation should be resent by: Send to e-mail at: [email or web address]  Will anyone else be joining your video visit? No        Video-Visit Details    Type of service:  Video Visit    Platform used: [employer]  Start time: 12:05 pm  Stop time: 12:31 pm      Originating Location (pt. Location): Home    Distant Location (provider location):  [facility] [place] ENDOCRINOLOGY     Platform used for Video Visit: [facility]      Endocrinology Clinic followup     Reason: DM and osteopenia  Primary care provider: Emilia Knutson     Reason for Endocrine consult: management of type II diabetes       Assessment and Plan:   1. Long-standing type II diabetes mellitus, non-insulin-dependent: currently on metformin thousand milligrams BID. Insulin discontinued in May. Has ESRD secondary to diabetic nephropathy s/p renal transplant in December 2019. Last HbA1c in July 2020 was 6.3. Staying stable A1C 6.2, excellent complinance to Metofmrin. Doing lots waling.     - continue current Metofmrin 2000 mg daily.      2. Diabetes complications:  - Urine microalbumin ordred.     3. Lipids: has ASCVD. On statin therapy     4. Calcium supplementation: has been encouraged to increase intake of food products with high calcium content  Ostepenia 2018, risk factor transplant    - continue Citrical supplementation to improve bone health  - bone density this year (ordered)          35 minutes spent on the date of the encounter doing chart review, history and exam, documentation and further activities as noted above.    Mariangel Merida MD  Staff Physician  Endocrinology and Metabolism  [facility]  License: [id number]  Pager: [phone]      Interval History as of 8/23/2021 : Patient has been doing well.  Medication compliance excellent  . New  event includes: walking a lot and enjoying the summer.  HPI: Murray Nicholson is a 65 year old male with PMHx of diabetes mellitus type II for more than 20 years, ischemic cardiomyopathy s/p orthotopic heart transplant in June 2018, diabetic nephropathy leading to ESRD s/p renal transplant in December 2019, hyperlipidemia, on immunosuppression with mycophenolate and tacrolimus    The patient states that he is happy with his diabetes control. When he underwent renal transplant he had been started on insulin therapy in the hospital and was discharged on the same. He was discharged on 18 units of glargine. He has slowly been able to take himself of insulin, he last gave himself insulin in May. Despite being of insulin his blood glucose readings have mostly stayed between 80 to 130. He tries to eat healthy and goes for walks 3 to 4 times a week. He states that he has regained his appetite after getting the renal transplant so he has been eating more than he used to. He has gained about 5 to 10 pounds as a result. He would like to lose this weight to become more healthy and is determined to do so. No episodes of hypoglycemia    History of Diabetes  Type 2  Diagnosed more than 20 years ago  Control: adequate  Treatment history: was previously on glipizide, insulin glargine    Current Treatment: metformin 1 g twice daily    HbA1c was 6.3 on 7/15/2020    Glucometer summary:     Average blood glucose checks per day: 1    Average:     Hypoglycemic episodes: none    Diet:     has three meals daily, sometimes snacks in between meals as well. Cooks at home    Steroids: none    Exercise :  walks three days a week for about 30 to 40 minutes    Tobacco use:  no    Complications:    Retinopathy: absent, according to the patient. Last eye exam was one month ago    Nephropathy: ESRD due to diabetic nephropathy s/p renal transplant    Neuropathy: denies symptoms. Unable to do monofilament test    Foot ulcers: none    CAD: ischemic  cardiomyopathy s/p heart transplant    Lipids: on statin therapy    Hypertension: off antihypertensives now    Infections: none    Endocrine review of system:    -Denies any polyuria, nocturia, excessive thirst or know sodium issues  -Denies polydipsia, polyphagia, blurred vision.   -Denies diarrhea or constipation or foul smelling stools or floating fatty stools  -Denies history of diarrhea, peptic ulcer disease or bleeding   -Denies episodic headache or unilateral headaches, denies any flushing or palpitation symptoms associated with that headache  -Denies any known thyroid issues  -Denies history of nipple discharge or gynecomastia or taking antifungal drugs  -Denies any known adrenal issues or low energy on a daily basis or nausea, vomiting or new abdominal pain.  -Denies skin changes, skin stretching or tanning  -Denies pigmentation in elbows, palate or mucosa  -Denies weight changes  -Denies muscle cramps or constipation      ROS:  All 12 systems were reviewed and negative except as mentioned in HPI    Past Medical/Surgical History:  Past Medical History:   Diagnosis Date     (HFpEF) heart failure with preserved ejection fraction (H)      Allergic rhinitis, cause unspecified      Anemia of chronic kidney failure      Aortic stenosis 8/13/2008    Overview:  Bicuspid aortic valve     AS (aortic stenosis)      Ascending aortic aneurysm (H)      Bicuspid aortic valve      Bilateral hand pain 6/1/2021     CAD (coronary artery disease)      Congestive heart failure, unspecified      Coronary artery disease involving native artery of transplanted heart without angina pectoris 7/10/2014     Dialysis patient (H)     Tues-Thur-Sat     Dyslipidemia      Esophageal reflux      ESRD (end stage renal disease) (H)      Hearing problem      Heart replaced by transplant (H) 12/10/2018     Hypersomnia with sleep apnea, unspecified      Hypertension      Ileostomy status (H)      Immunosuppression (H)      MGUS (monoclonal  gammopathy of unknown significance)      Mitral regurgitation      SHEELA (obstructive sleep apnea)     No CPAP     Pneumonia      Sigmoid diverticulitis 8/14/2018     Status post coronary angiogram 6/28/2019     Systolic heart failure (H)      Type 2 diabetes mellitus (H)      Past Surgical History:   Procedure Laterality Date     CARDIAC SURGERY       COLONOSCOPY N/A 5/3/2018    Procedure: COLONOSCOPY;  colonoscopy ;  Surgeon: Ammon Castillo MD;  Location: [facility]     CREATE FISTULA ARTERIOVENOUS UPPER EXTREMITY BOVINE Left 5/8/2019    Procedure: Left Upper Extremity Arteriovenous Bovine Graft Creation;  Surgeon: Calin Cheney MD;  Location[facility]     CV CORONARY ANGIOGRAM N/A 6/28/2019    Procedure: CV CORONARY ANGIOGRAM;  Surgeon: Montrell Posada MD;  [facility]     CV CORONARY ANGIOGRAM N/A 7/15/2020    Procedure: CV CORONARY ANGIOGRAM;  Surgeon: Marcelo Ramírez MD;  [facility]     CV CORONARY ANGIOGRAM N/A 6/7/2021    Procedure: CV CORONARY ANGIOGRAM;  Surgeon: Gilberto Mccann MD;  [facility]     CV HEART BIOPSY N/A 2/1/2019    Procedure: HBX;  Surgeon: Montrell Posada MD;  [facility]     CV HEART BIOPSY N/A 3/22/2019    Procedure: HBX, RIJV ACCESS;  Surgeon: Jordan Fox MD;  [facility]     CV HEART BIOPSY N/A 6/28/2019    Procedure: CV HEART BIOPSY;  Surgeon: Montrell Posada MD;  [facility]     CV HEART BIOPSY N/A 10/28/2019    Procedure: CV HEART BIOPSY;  Surgeon: Marcelo Ramírez MD;  [facility]     CV HEART BIOPSY N/A 2/6/2020    Procedure: CV HEART BIOPSY;  Surgeon: Montrell Posada MD;  [facility]     CV HEART BIOPSY N/A 7/15/2020    Procedure: CV HEART BIOPSY;  Surgeon: Marcelo Ramírez MD;  [facility]     CV RIGHT HEART CATH  MEASUREMENTS RECORDED N/A 6/28/2019    Procedure: CV RIGHT HEART CATH;  Surgeon: Montrell Posada MD;  Location:  HEART CARDIAC CATH LAB     CV RIGHT HEART CATH MEASUREMENTS RECORDED N/A 7/15/2020    Procedure: CV RIGHT HEART CATH;  Surgeon: Marcelo Ramírez MD;  Location:  HEART CARDIAC CATH LAB     CV RIGHT HEART CATH MEASUREMENTS RECORDED N/A 6/7/2021    Procedure: CV RIGHT HEART CATH;  Surgeon: Gilberto Mccann MD;  Location:  HEART CARDIAC CATH LAB     ESOPHAGOSCOPY, GASTROSCOPY, DUODENOSCOPY (EGD), COMBINED N/A 5/7/2018    Procedure: COMBINED ENDOSCOPIC ULTRASOUND, ESOPHAGOSCOPY, GASTROSCOPY, DUODENOSCOPY (EGD), FINE NEEDLE ASPIRATE/BIOPSY;  Endoscopic Ultrasound with Fine Needle Aspiration ;  Surgeon: Alon Don MD;  [facility]     EXAM UNDER ANESTHESIA RECTUM N/A 8/12/2018    Procedure: EXAM UNDER ANESTHESIA RECTUM;  EXAM UNDER ANESTHESIA RECTUM ,COMBINED INCISION AND DRAINAGE OF RECTAL ABCESS ;  Surgeon: Rick Tran MD;  [facility]     INCISION AND DRAINAGE RECTUM, COMBINED N/A 8/12/2018    Procedure: COMBINED INCISION AND DRAINAGE RECTUM;;  Surgeon: Rick Tran MD;  [facility]     IR DIALYSIS FISTULOGRAM LEFT  9/25/2019     IR DIALYSIS FISTULOGRAM LEFT  11/22/2019     IR DIALYSIS PTA  9/25/2019     IR DIALYSIS PTA  11/22/2019     LAPAROSCOPIC ASSISTED COLOSTOMY TAKEDOWN N/A 12/11/2018    Procedure: Laparoscopic Assisted Colostomy Takedown, Laparoscopic Lysis of Adhesions;  Surgeon: Rick Tran MD;  [facility]     LAPAROSCOPIC ASSISTED SIGMOID COLECTOMY N/A 8/14/2018    Procedure: LAPAROSCOPIC ASSISTED SIGMOID COLECTOMY;  Laparoscopic Hand Assisted Takedown of Splenic Flexure, Sigmoidectomy, Small Bowel Resection, Takedown of Small Bowel to Colon Fistula;  Surgeon: Rick Tran MD;  [facility]     LAPAROSCOPIC HERNIORRHAPHY INGUINAL BILATERAL Bilateral 7/24/2015    Procedure: LAPAROSCOPIC  HERNIORRHAPHY INGUINAL BILATERAL;  Surgeon: Bobby Mcconnell MD;  [facility]     LAPAROSCOPIC INSERTION CATHETER PERITONEAL DIALYSIS N/A 6/22/2017    Procedure: LAPAROSCOPIC INSERTION CATHETER PERITONEAL DIALYSIS;  Laparoscopic Peritoneal Dialysis Catheter Placement - Anesthesia with block;  Surgeon: Esteban Arvizu MD;  [facility]     PICC INSERTION Left 04/22/2018    5Fr - 49cm (3cm external), Basilic vein, low SVC     REMOVE CATHETER PERITONEAL Right 1/15/2018    Procedure: REMOVE CATHETER PERITONEAL;  Open Removal of Peritoneal Dialysis Catheter ;  Surgeon: Esteban Arvizu MD;  [facility]     SIGMOIDOSCOPY FLEXIBLE N/A 11/21/2018    Procedure: Examination Under Anesthesia, Flexible Sigmoidoscopy and Polypectomy;  Surgeon: Rick Tran MD;  [facility]     SIGMOIDOSCOPY FLEXIBLE N/A 12/11/2018    Procedure: Flexible Sigmoidoscopy;  Surgeon: Rick Tran MD;  [facility]     TAKEDOWN ILEOSTOMY N/A 3/27/2019    Procedure: Takedown Ileostomy;  Surgeon: Rick Tran MD;  [facility]     TRANSPLANT HEART RECIPIENT N/A 6/14/2018    Procedure: TRANSPLANT HEART RECIPIENT;  Median Sternotomy, on-pump oxygenator, Heart Transplant;  Surgeon: Rony Caputo MD;  [facility]     TRANSPLANT KIDNEY RECIPIENT LIVING UNRELATED  12/2019       Allergies:  Allergies   Allergen Reactions     Norco [Hydrocodone-Acetaminophen] Nausea and Vomiting     Cats      Throat tightness     Isosorbide Other (See Comments)     hypotension     Penicillins Hives     Seasonal Allergies      rhinitis     Shrimp      Throat closes        PTA Meds:  Prior to Admission medications    Medication Sig Last Dose Taking? Auth Provider   ACCU-CHEK GUIDE test strip USE TO TEST BLOOD SUGAR 2 TO 3 TIMES DAILY OR AS DIRECTED Taking Yes Yahir Turcios MD   acetaminophen (TYLENOL) 325 MG tablet Take 2 tablets (650 mg) by mouth every 4 hours as needed for other (multimodal  surgical pain management along with NSAIDS and opioid medication as indicated based on pain control and physical function.) Taking Yes Fatimah Borja PA-C   aspirin 81 MG EC tablet Take 81 mg by mouth daily Taking Yes Reported, Patient   atorvastatin (LIPITOR) 40 MG tablet Take 1 tablet (40 mg) by mouth daily Taking Yes Klever Ernst MD   biotin 1000 MCG TABS tablet Take 1,000 mcg by mouth daily Patient takes every other day Taking Yes Reported, Patient   blood glucose monitoring (ACCU-CHEK FASTCLIX) lancets USE TO TEST 2-3 TIMES DAILY OR AS DIRECTED. Taking Yes Emilia Knutson MD   famotidine (PEPCID) 20 MG tablet Take 1 tablet (20 mg) by mouth 2 times daily Taking Yes Yahir Turcios MD   fluticasone (FLONASE) 50 MCG/ACT nasal spray Spray 1 spray into both nostrils daily Taking Yes Ana Luisa Coleman APRN CNP   Lactobacillus (ACIDOPHILUS PO) 4 billion active cultures Taking Yes Reported, Patient   loratadine (CLARITIN) 10 MG tablet Take 10 mg by mouth daily Patient takes at bedtime Taking Yes Reported, Patient   magnesium oxide (MAG-OX) 400 MG tablet Total dose = 800 mg at lunch time and 400 mg at dinner time Taking Yes Gilberto Ulloa MD   metFORMIN (GLUCOPHAGE-XR) 500 MG 24 hr tablet Take 4 tablets (2,000 mg) by mouth daily (with breakfast) Taking Yes Yahir Turcios MD   Multiple Vitamin (DAILY-DONY) TABS TAKE 1 TABLET BY MOUTH DAILY Taking Yes Yahir Turcios MD   mycophenolate (GENERIC EQUIVALENT) 250 MG capsule Take 3 capsules (750 mg) by mouth 2 times daily Taking Yes Giovany Quijano MD   pantoprazole (PROTONIX) 20 MG EC tablet Take 40 mg by mouth every other day Taking Yes Reported, Patient   sodium bicarbonate 650 MG tablet Take 2 tablets (1,300 mg) by mouth daily Taking Yes Giovany Quijano MD   sulfamethoxazole-trimethoprim (BACTRIM/SEPTRA) 400-80 MG tablet Take 1 tablet by mouth daily Taking Yes Bobby Escobar MD   tacrolimus (GENERIC  EQUIVALENT) 1 MG capsule Take 1 capsule (1 mg) by mouth 2 times daily Taking Yes Gilberto Ulloa MD   Vitamin D3 (CHOLECALCIFEROL) 25 mcg (1000 units) tablet Take 1 tablet (25 mcg) by mouth daily Taking Yes Giovany Quijano MD   insulin pen needle (B-D U/F) 31G X 5 MM miscellaneous Use 1 daily as directed.  Patient not taking: Reported on 2020 Not Taking  Yahir Turcios MD   tacrolimus (GENERIC EQUIVALENT) 0.5 MG capsule HOLD  Patient not taking: Reported on 2020 Not Taking  Gilberto Ulloa MD        Current Medications:   Current Outpatient Medications   Medication     aspirin 81 MG EC tablet     atorvastatin (LIPITOR) 40 MG tablet     biotin 1000 MCG TABS tablet     blood glucose (ACCU-CHEK GUIDE) test strip     blood glucose monitoring (ACCU-CHEK FASTCLIX) lancets     calcium citrate and vitamin D (CITRACAL) 200-250 MG-UNIT TABS per tablet     famotidine (PEPCID) 20 MG tablet     fluticasone (FLONASE) 50 MCG/ACT nasal spray     loratadine (CLARITIN) 10 MG tablet     metFORMIN (GLUCOPHAGE-XR) 500 MG 24 hr tablet     multivitamin (CENTRUM SILVER) tablet     mycophenolate (GENERIC EQUIVALENT) 250 MG capsule     sulfamethoxazole-trimethoprim (BACTRIM) 400-80 MG tablet     tacrolimus (GENERIC EQUIVALENT) 0.5 MG capsule     tacrolimus (GENERIC EQUIVALENT) 1 MG capsule     Zinc Sulfate (ZINC 15 PO)     No current facility-administered medications for this visit.     Facility-Administered Medications Ordered in Other Visits   Medication     diatrizoate meglumine-sodium (GASTROGRAFIN/GASTROVIEW) 66-10 % solution 480 mL       Family History:  Family History   Problem Relation Age of Onset     C.A.D. Father          from-never knew father-age 60     Diabetes Father      Cerebrovascular Disease Father      Hypertension Father      Hypertension No family hx of      Breast Cancer No family hx of      Cancer - colorectal No family hx of      Prostate Cancer No family hx of      Kidney Disease No  family hx of      Melanoma No family hx of      Skin Cancer No family hx of        Social History:  Social History     Tobacco Use     Smoking status: Former Smoker     Packs/day: 1.00     Years: 20.00     Pack years: 20.00     Types: Cigarettes     Start date: 1984     Quit date: 1994     Years since quittin.0     Smokeless tobacco: Never Used   Substance Use Topics     Alcohol use: Yes     Comment: occasionally         Physical examination:  General:  Healthy, alert and oriented X3, NAD, answering questions appropriately.  Pulmonary: No audible wheeze or cough. Able to speak fully in complete sentences.   Neurological: Alert. Mentation intact and speech normal.  Psychological: mentation appears normal, affect normal/bright, judgement and insight intact, normal speech  Physical exam is limited due to virtual visit related to COVID-19     Endocrine Labs:  ENDO DIABETES Latest Ref Rng & Units 2020 7/15/2020   HEMOGLOBIN A1C 0 - 5.6 % 6.1 (H) 6.3 (H)   HGB 13.3 - 17.7 g/dL 13.5 12.9 (L)   GLUCOSE 70 - 99 mg/dL 116 (H) 112 (H)     ENDO DIABETES Latest Ref Rng & Units 2020 7/15/2020   CHOLESTEROL <200 mg/dL 104 108   LDL CHOLESTEROL, CALCULATED <100 mg/dL 27 23   LDL CHOLESTEROL DIRECT 0 - 129 mg/dL     HDL CHOLESTEROL >39 mg/dL 43 42   VLDL-CHOLESTEROL 0 - 30 mg/dL     NON HDL CHOLESTEROL <130 mg/dL 61 66   TRIGLYCERIDES <150 mg/dL 166 (H) 217 (H)

## 2021-08-22 PROBLEM — R35.1 NOCTURIA: Status: ACTIVE | Noted: 2021-08-22

## 2021-08-22 PROBLEM — Z98.890 HISTORY OF HERNIA REPAIR: Status: ACTIVE | Noted: 2021-08-22

## 2021-08-22 PROBLEM — N52.9 ERECTILE DYSFUNCTION, UNSPECIFIED ERECTILE DYSFUNCTION TYPE: Status: ACTIVE | Noted: 2021-08-22

## 2021-08-22 PROBLEM — Z87.19 HISTORY OF HERNIA REPAIR: Status: ACTIVE | Noted: 2021-08-22

## 2021-08-23 ENCOUNTER — VIRTUAL VISIT (OUTPATIENT)
Dept: ENDOCRINOLOGY | Facility: CLINIC | Age: 66
End: 2021-08-23
Payer: MEDICARE

## 2021-08-23 DIAGNOSIS — E11.9 TYPE 2 DIABETES MELLITUS WITHOUT COMPLICATION, WITHOUT LONG-TERM CURRENT USE OF INSULIN (H): Primary | ICD-10-CM

## 2021-08-23 DIAGNOSIS — M81.0 OSTEOPOROSIS, UNSPECIFIED OSTEOPOROSIS TYPE, UNSPECIFIED PATHOLOGICAL FRACTURE PRESENCE: ICD-10-CM

## 2021-08-23 DIAGNOSIS — Z94.0 STATUS POST KIDNEY TRANSPLANT: ICD-10-CM

## 2021-08-23 PROBLEM — H91.90 DECREASED HEARING, UNSPECIFIED LATERALITY: Status: ACTIVE | Noted: 2021-08-23

## 2021-08-23 PROCEDURE — 99214 OFFICE O/P EST MOD 30 MIN: CPT | Mod: 95 | Performed by: INTERNAL MEDICINE

## 2021-08-23 NOTE — LETTER
8/23/2021       RE: Murray Nicholson  [address]  [place] [address]     Dear Colleague,    Thank you for referring your patient, Murray Nicholson, to the [facility] [place] at [facility]. Please see a copy of my visit note below.    dm 2  Minerva Javier, REA Gao is a 66 year old who is being evaluated via a billable video visit.      How would you like to obtain your AVS? MyChart  If the video visit is dropped, the invitation should be resent by: Send to e-mail at: [email or web address]  Will anyone else be joining your video visit? No        Video-Visit Details    Type of service:  Video Visit    Platform used: Myoonet  Start time: 12:05 pm  Stop time: 12:31 pm      Originating Location (pt. Location): Home    Distant Location (provider location):  [facility] ENDOCRINOLOGY     Platform used for Video Visit: [facility]      Endocrinology Clinic followup     Reason: DM and osteopenia  Primary care provider: Emilia Knutson     Reason for Endocrine consult: management of type II diabetes       Assessment and Plan:   1. Long-standing type II diabetes mellitus, non-insulin-dependent: currently on metformin thousand milligrams BID. Insulin discontinued in May. Has ESRD secondary to diabetic nephropathy s/p renal transplant in December 2019. Last HbA1c in July 2020 was 6.3. Staying stable A1C 6.2, excellent complinance to Metofmrin. Doing lots waling.     - continue current Metofmrin 2000 mg daily.      2. Diabetes complications:  - Urine microalbumin ordred.     3. Lipids: has ASCVD. On statin therapy     4. Calcium supplementation: has been encouraged to increase intake of food products with high calcium content  Ostepenia 2018, risk factor transplant    - continue Citrical supplementation to improve bone health  - bone density this year (ordered)          35 minutes spent on the date of the encounter doing chart  review, history and exam, documentation and further activities as noted above.    Mariangel Merida MD  Staff Physician  Endocrinology and Metabolism  [facility]  License: [id number]  Pager: [phone]      Interval History as of 8/23/2021 : Patient has been doing well.  Medication compliance excellent  . New event includes: walking a lot and enjoying the summer.  HPI: Murray Nicholson is a 65 year old male with PMHx of diabetes mellitus type II for more than 20 years, ischemic cardiomyopathy s/p orthotopic heart transplant in June 2018, diabetic nephropathy leading to ESRD s/p renal transplant in December 2019, hyperlipidemia, on immunosuppression with mycophenolate and tacrolimus    The patient states that he is happy with his diabetes control. When he underwent renal transplant he had been started on insulin therapy in the hospital and was discharged on the same. He was discharged on 18 units of glargine. He has slowly been able to take himself of insulin, he last gave himself insulin in May. Despite being of insulin his blood glucose readings have mostly stayed between 80 to 130. He tries to eat healthy and goes for walks 3 to 4 times a week. He states that he has regained his appetite after getting the renal transplant so he has been eating more than he used to. He has gained about 5 to 10 pounds as a result. He would like to lose this weight to become more healthy and is determined to do so. No episodes of hypoglycemia    History of Diabetes  Type 2  Diagnosed more than 20 years ago  Control: adequate  Treatment history: was previously on glipizide, insulin glargine    Current Treatment: metformin 1 g twice daily    HbA1c was 6.3 on 7/15/2020    Glucometer summary:     Average blood glucose checks per day: 1    Average:     Hypoglycemic episodes: none    Diet:     has three meals daily, sometimes snacks in between meals as well. Cooks at home    Steroids: none    Exercise :  walks three days a  week for about 30 to 40 minutes    Tobacco use:  no    Complications:    Retinopathy: absent, according to the patient. Last eye exam was one month ago    Nephropathy: ESRD due to diabetic nephropathy s/p renal transplant    Neuropathy: denies symptoms. Unable to do monofilament test    Foot ulcers: none    CAD: ischemic cardiomyopathy s/p heart transplant    Lipids: on statin therapy    Hypertension: off antihypertensives now    Infections: none    Endocrine review of system:    -Denies any polyuria, nocturia, excessive thirst or know sodium issues  -Denies polydipsia, polyphagia, blurred vision.   -Denies diarrhea or constipation or foul smelling stools or floating fatty stools  -Denies history of diarrhea, peptic ulcer disease or bleeding   -Denies episodic headache or unilateral headaches, denies any flushing or palpitation symptoms associated with that headache  -Denies any known thyroid issues  -Denies history of nipple discharge or gynecomastia or taking antifungal drugs  -Denies any known adrenal issues or low energy on a daily basis or nausea, vomiting or new abdominal pain.  -Denies skin changes, skin stretching or tanning  -Denies pigmentation in elbows, palate or mucosa  -Denies weight changes  -Denies muscle cramps or constipation      ROS:  All 12 systems were reviewed and negative except as mentioned in HPI    Past Medical/Surgical History:  Past Medical History:   Diagnosis Date     (HFpEF) heart failure with preserved ejection fraction (H)      Allergic rhinitis, cause unspecified      Anemia of chronic kidney failure      Aortic stenosis 8/13/2008    Overview:  Bicuspid aortic valve     AS (aortic stenosis)      Ascending aortic aneurysm (H)      Bicuspid aortic valve      Bilateral hand pain 6/1/2021     CAD (coronary artery disease)      Congestive heart failure, unspecified      Coronary artery disease involving native artery of transplanted heart without angina pectoris 7/10/2014     Dialysis  patient (H)     Tues-Thur-Sat     Dyslipidemia      Esophageal reflux      ESRD (end stage renal disease) (H)      Hearing problem      Heart replaced by transplant (H) 12/10/2018     Hypersomnia with sleep apnea, unspecified      Hypertension      Ileostomy status (H)      Immunosuppression (H)      MGUS (monoclonal gammopathy of unknown significance)      Mitral regurgitation      SHEELA (obstructive sleep apnea)     No CPAP     Pneumonia      Sigmoid diverticulitis 8/14/2018     Status post coronary angiogram 6/28/2019     Systolic heart failure (H)      Type 2 diabetes mellitus (H)      Past Surgical History:   Procedure Laterality Date     CARDIAC SURGERY       COLONOSCOPY N/A 5/3/2018    Procedure: COLONOSCOPY;  colonoscopy ;  Surgeon: Ammon Castillo MD;  [facility]: [facility]     CREATE FISTULA ARTERIOVENOUS UPPER EXTREMITY BOVINE Left 5/8/2019    Procedure: Left Upper Extremity Arteriovenous Bovine Graft Creation;  Surgeon: Calin Cheney MD;  [facility]     CV CORONARY ANGIOGRAM N/A 6/28/2019    Procedure: CV CORONARY ANGIOGRAM;  Surgeon: Montrell Posada MD;  Location:  HEART CARDIAC CATH LAB     CV CORONARY ANGIOGRAM N/A 7/15/2020    Procedure: CV CORONARY ANGIOGRAM;  Surgeon: Marcelo Ramírez MD;  Location:  HEART CARDIAC CATH LAB     CV CORONARY ANGIOGRAM N/A 6/7/2021    Procedure: CV CORONARY ANGIOGRAM;  Surgeon: Gilberto Mccann MD;  Location:  HEART CARDIAC CATH LAB     CV HEART BIOPSY N/A 2/1/2019    Procedure: HBX;  Surgeon: Montrell Posada MD;  [facility]     CV HEART BIOPSY N/A 3/22/2019    Procedure: HBX, RIJV ACCESS;  Surgeon: Jordan Fox MD;  [facility]     CV HEART BIOPSY N/A 6/28/2019    Procedure: CV HEART BIOPSY;  Surgeon: Montrell Posada MD;  Location:  HEART CARDIAC CATH LAB     CV HEART BIOPSY N/A 10/28/2019    Procedure: CV HEART BIOPSY;  Surgeon: Marcelo Ramírez MD;  Location:  HEART  CARDIAC CATH LAB     CV HEART BIOPSY N/A 2/6/2020    Procedure: CV HEART BIOPSY;  Surgeon: Montrell Posada MD;  Location:  HEART CARDIAC CATH LAB     CV HEART BIOPSY N/A 7/15/2020    Procedure: CV HEART BIOPSY;  Surgeon: Marcelo Ramírez MD;  Location:  HEART CARDIAC CATH LAB     CV RIGHT HEART CATH MEASUREMENTS RECORDED N/A 6/28/2019    Procedure: CV RIGHT HEART CATH;  Surgeon: Montrell Posada MD;  Location:  HEART CARDIAC CATH LAB     CV RIGHT HEART CATH MEASUREMENTS RECORDED N/A 7/15/2020    Procedure: CV RIGHT HEART CATH;  Surgeon: Marcelo Ramírez MD;  Location:  HEART CARDIAC CATH LAB     CV RIGHT HEART CATH MEASUREMENTS RECORDED N/A 6/7/2021    Procedure: CV RIGHT HEART CATH;  Surgeon: Gilberto Mccann MD;  Location:  HEART CARDIAC CATH LAB     ESOPHAGOSCOPY, GASTROSCOPY, DUODENOSCOPY (EGD), COMBINED N/A 5/7/2018    Procedure: COMBINED ENDOSCOPIC ULTRASOUND, ESOPHAGOSCOPY, GASTROSCOPY, DUODENOSCOPY (EGD), FINE NEEDLE ASPIRATE/BIOPSY;  Endoscopic Ultrasound with Fine Needle Aspiration ;  Surgeon: Alon Don MD;  [facility]     EXAM UNDER ANESTHESIA RECTUM N/A 8/12/2018    Procedure: EXAM UNDER ANESTHESIA RECTUM;  EXAM UNDER ANESTHESIA RECTUM ,COMBINED INCISION AND DRAINAGE OF RECTAL ABCESS ;  Surgeon: Rick Tran MD;  [facility]     INCISION AND DRAINAGE RECTUM, COMBINED N/A 8/12/2018    Procedure: COMBINED INCISION AND DRAINAGE RECTUM;;  Surgeon: Rick Tran MD;  [facility]     IR DIALYSIS FISTULOGRAM LEFT  9/25/2019     IR DIALYSIS FISTULOGRAM LEFT  11/22/2019     IR DIALYSIS PTA  9/25/2019     IR DIALYSIS PTA  11/22/2019     LAPAROSCOPIC ASSISTED COLOSTOMY TAKEDOWN N/A 12/11/2018    Procedure: Laparoscopic Assisted Colostomy Takedown, Laparoscopic Lysis of Adhesions;  Surgeon: Rick Tran MD;  [facility]     LAPAROSCOPIC ASSISTED SIGMOID COLECTOMY N/A 8/14/2018    Procedure: LAPAROSCOPIC ASSISTED  SIGMOID COLECTOMY;  Laparoscopic Hand Assisted Takedown of Splenic Flexure, Sigmoidectomy, Small Bowel Resection, Takedown of Small Bowel to Colon Fistula;  Surgeon: Rick Tran MD;  [facility]     LAPAROSCOPIC HERNIORRHAPHY INGUINAL BILATERAL Bilateral 7/24/2015    Procedure: LAPAROSCOPIC HERNIORRHAPHY INGUINAL BILATERAL;  Surgeon: Bobby Mcconnell MD;  [facility]     LAPAROSCOPIC INSERTION CATHETER PERITONEAL DIALYSIS N/A 6/22/2017    Procedure: LAPAROSCOPIC INSERTION CATHETER PERITONEAL DIALYSIS;  Laparoscopic Peritoneal Dialysis Catheter Placement - Anesthesia with block;  Surgeon: Esteban Arvizu MD;  [facility]     PICC INSERTION Left 04/22/2018    5Fr - 49cm (3cm external), Basilic vein, low SVC     REMOVE CATHETER PERITONEAL Right 1/15/2018    Procedure: REMOVE CATHETER PERITONEAL;  Open Removal of Peritoneal Dialysis Catheter ;  Surgeon: Esteban Arvizu MD;  [facility]     SIGMOIDOSCOPY FLEXIBLE N/A 11/21/2018    Procedure: Examination Under Anesthesia, Flexible Sigmoidoscopy and Polypectomy;  Surgeon: Rick Tran MD;  [facility]     SIGMOIDOSCOPY FLEXIBLE N/A 12/11/2018    Procedure: Flexible Sigmoidoscopy;  Surgeon: Rick Tran MD;  [facility]     TAKEDOWN ILEOSTOMY N/A 3/27/2019    Procedure: Takedown Ileostomy;  Surgeon: Rick Tran MD;  [facility]     TRANSPLANT HEART RECIPIENT N/A 6/14/2018    Procedure: TRANSPLANT HEART RECIPIENT;  Median Sternotomy, on-pump oxygenator, Heart Transplant;  Surgeon: Rony Caputo MD;  [facility]     TRANSPLANT KIDNEY RECIPIENT LIVING UNRELATED  12/2019       Allergies:  Allergies   Allergen Reactions     Norco [Hydrocodone-Acetaminophen] Nausea and Vomiting     Cats      Throat tightness     Isosorbide Other (See Comments)     hypotension     Penicillins Hives     Seasonal Allergies      rhinitis     Shrimp      Throat closes        PTA Meds:  Prior to  Admission medications    Medication Sig Last Dose Taking? Auth Provider   ACCU-CHEK GUIDE test strip USE TO TEST BLOOD SUGAR 2 TO 3 TIMES DAILY OR AS DIRECTED Taking Yes Yahir Turcios MD   acetaminophen (TYLENOL) 325 MG tablet Take 2 tablets (650 mg) by mouth every 4 hours as needed for other (multimodal surgical pain management along with NSAIDS and opioid medication as indicated based on pain control and physical function.) Taking Yes Fatimah Borja PA-C   aspirin 81 MG EC tablet Take 81 mg by mouth daily Taking Yes Reported, Patient   atorvastatin (LIPITOR) 40 MG tablet Take 1 tablet (40 mg) by mouth daily Taking Yes Klever Ernst MD   biotin 1000 MCG TABS tablet Take 1,000 mcg by mouth daily Patient takes every other day Taking Yes Reported, Patient   blood glucose monitoring (ACCU-CHEK FASTCLIX) lancets USE TO TEST 2-3 TIMES DAILY OR AS DIRECTED. Taking Yes Emilia Knutson MD   famotidine (PEPCID) 20 MG tablet Take 1 tablet (20 mg) by mouth 2 times daily Taking Yes Yahir Turcios MD   fluticasone (FLONASE) 50 MCG/ACT nasal spray Spray 1 spray into both nostrils daily Taking Yes Ana Luisa Coleman APRN CNP   Lactobacillus (ACIDOPHILUS PO) 4 billion active cultures Taking Yes Reported, Patient   loratadine (CLARITIN) 10 MG tablet Take 10 mg by mouth daily Patient takes at bedtime Taking Yes Reported, Patient   magnesium oxide (MAG-OX) 400 MG tablet Total dose = 800 mg at lunch time and 400 mg at dinner time Taking Yes Gilberto Ulloa MD   metFORMIN (GLUCOPHAGE-XR) 500 MG 24 hr tablet Take 4 tablets (2,000 mg) by mouth daily (with breakfast) Taking Yes Yahir Turcios MD   Multiple Vitamin (DAILY-DONY) TABS TAKE 1 TABLET BY MOUTH DAILY Taking Yes Yahir Turcios MD   mycophenolate (GENERIC EQUIVALENT) 250 MG capsule Take 3 capsules (750 mg) by mouth 2 times daily Taking Yes Giovany Quijano MD   pantoprazole (PROTONIX) 20 MG EC tablet Take 40 mg by mouth every  other day Taking Yes Reported, Patient   sodium bicarbonate 650 MG tablet Take 2 tablets (1,300 mg) by mouth daily Taking Yes Giovany Quijano MD   sulfamethoxazole-trimethoprim (BACTRIM/SEPTRA) 400-80 MG tablet Take 1 tablet by mouth daily Taking Yes Bobby Escobar MD   tacrolimus (GENERIC EQUIVALENT) 1 MG capsule Take 1 capsule (1 mg) by mouth 2 times daily Taking Yes Gilberto Ulloa MD   Vitamin D3 (CHOLECALCIFEROL) 25 mcg (1000 units) tablet Take 1 tablet (25 mcg) by mouth daily Taking Yes Giovany Quijano MD   insulin pen needle (B-D U/F) 31G X 5 MM miscellaneous Use 1 daily as directed.  Patient not taking: Reported on 7/20/2020 Not Taking  Yahir Turcios MD   tacrolimus (GENERIC EQUIVALENT) 0.5 MG capsule HOLD  Patient not taking: Reported on 7/20/2020 Not Taking  Gilberto Ulloa MD        Current Medications:   Current Outpatient Medications   Medication     aspirin 81 MG EC tablet     atorvastatin (LIPITOR) 40 MG tablet     biotin 1000 MCG TABS tablet     blood glucose (ACCU-CHEK GUIDE) test strip     blood glucose monitoring (ACCU-CHEK FASTCLIX) lancets     calcium citrate and vitamin D (CITRACAL) 200-250 MG-UNIT TABS per tablet     famotidine (PEPCID) 20 MG tablet     fluticasone (FLONASE) 50 MCG/ACT nasal spray     loratadine (CLARITIN) 10 MG tablet     metFORMIN (GLUCOPHAGE-XR) 500 MG 24 hr tablet     multivitamin (CENTRUM SILVER) tablet     mycophenolate (GENERIC EQUIVALENT) 250 MG capsule     sulfamethoxazole-trimethoprim (BACTRIM) 400-80 MG tablet     tacrolimus (GENERIC EQUIVALENT) 0.5 MG capsule     tacrolimus (GENERIC EQUIVALENT) 1 MG capsule     Zinc Sulfate (ZINC 15 PO)     No current facility-administered medications for this visit.     Facility-Administered Medications Ordered in Other Visits   Medication     diatrizoate meglumine-sodium (GASTROGRAFIN/GASTROVIEW) 66-10 % solution 480 mL       Family History:  Family History   Problem Relation Age of Onset     C.A.D.  Father          from-never knew father-age 60     Diabetes Father      Cerebrovascular Disease Father      Hypertension Father      Hypertension No family hx of      Breast Cancer No family hx of      Cancer - colorectal No family hx of      Prostate Cancer No family hx of      Kidney Disease No family hx of      Melanoma No family hx of      Skin Cancer No family hx of        Social History:  Social History     Tobacco Use     Smoking status: Former Smoker     Packs/day: 1.00     Years: 20.00     Pack years: 20.00     Types: Cigarettes     Start date: 1984     Quit date: 1994     Years since quittin.0     Smokeless tobacco: Never Used   Substance Use Topics     Alcohol use: Yes     Comment: occasionally         Physical examination:  General:  Healthy, alert and oriented X3, NAD, answering questions appropriately.  Pulmonary: No audible wheeze or cough. Able to speak fully in complete sentences.   Neurological: Alert. Mentation intact and speech normal.  Psychological: mentation appears normal, affect normal/bright, judgement and insight intact, normal speech  Physical exam is limited due to virtual visit related to COVID-19     Endocrine Labs:  ENDO DIABETES Latest Ref Rng & Units 2020 7/15/2020   HEMOGLOBIN A1C 0 - 5.6 % 6.1 (H) 6.3 (H)   HGB 13.3 - 17.7 g/dL 13.5 12.9 (L)   GLUCOSE 70 - 99 mg/dL 116 (H) 112 (H)     ENDO DIABETES Latest Ref Rng & Units 2020 7/15/2020   CHOLESTEROL <200 mg/dL 104 108   LDL CHOLESTEROL, CALCULATED <100 mg/dL 27 23   LDL CHOLESTEROL DIRECT 0 - 129 mg/dL     HDL CHOLESTEROL >39 mg/dL 43 42   VLDL-CHOLESTEROL 0 - 30 mg/dL     NON HDL CHOLESTEROL <130 mg/dL 61 66   TRIGLYCERIDES <150 mg/dL 166 (H) 217 (H)

## 2021-08-29 DIAGNOSIS — E87.20 METABOLIC ACIDOSIS: ICD-10-CM

## 2021-08-29 DIAGNOSIS — Z94.0 KIDNEY REPLACED BY TRANSPLANT: ICD-10-CM

## 2021-08-29 DIAGNOSIS — Z94.0 KIDNEY TRANSPLANTED: ICD-10-CM

## 2021-08-30 RX ORDER — TACROLIMUS 1 MG/1
CAPSULE ORAL
Qty: 90 CAPSULE | Refills: 3 | Status: SHIPPED | OUTPATIENT
Start: 2021-08-30 | End: 2022-07-26

## 2021-08-30 RX ORDER — TACROLIMUS 0.5 MG/1
CAPSULE ORAL
Qty: 90 CAPSULE | Refills: 3 | Status: SHIPPED | OUTPATIENT
Start: 2021-08-30 | End: 2022-07-26

## 2021-09-07 ENCOUNTER — LAB (OUTPATIENT)
Dept: LAB | Facility: CLINIC | Age: 66
End: 2021-09-07
Attending: INTERNAL MEDICINE
Payer: MEDICARE

## 2021-09-07 DIAGNOSIS — Z94.0 HTN, KIDNEY TRANSPLANT RELATED: ICD-10-CM

## 2021-09-07 DIAGNOSIS — E55.9 VITAMIN D DEFICIENCY: ICD-10-CM

## 2021-09-07 DIAGNOSIS — I15.1 HTN, KIDNEY TRANSPLANT RELATED: ICD-10-CM

## 2021-09-07 DIAGNOSIS — Z79.899 ENCOUNTER FOR LONG-TERM CURRENT USE OF MEDICATION: ICD-10-CM

## 2021-09-07 DIAGNOSIS — Z94.0 KIDNEY REPLACED BY TRANSPLANT: ICD-10-CM

## 2021-09-07 DIAGNOSIS — R93.89 ABNORMAL FINDINGS ON DIAGNOSTIC IMAGING OF OTHER SPECIFIED BODY STRUCTURES: ICD-10-CM

## 2021-09-07 DIAGNOSIS — Z48.298 AFTERCARE FOLLOWING ORGAN TRANSPLANT: ICD-10-CM

## 2021-09-07 LAB
ANION GAP SERPL CALCULATED.3IONS-SCNC: 9 MMOL/L (ref 3–14)
BUN SERPL-MCNC: 18 MG/DL (ref 7–30)
CALCIUM SERPL-MCNC: 9.4 MG/DL (ref 8.5–10.1)
CHLORIDE BLD-SCNC: 104 MMOL/L (ref 94–109)
CO2 SERPL-SCNC: 26 MMOL/L (ref 20–32)
CREAT SERPL-MCNC: 1.15 MG/DL (ref 0.66–1.25)
DEPRECATED CALCIDIOL+CALCIFEROL SERPL-MC: 38 UG/L (ref 20–75)
ERYTHROCYTE [DISTWIDTH] IN BLOOD BY AUTOMATED COUNT: 13.1 % (ref 10–15)
GFR SERPL CREATININE-BSD FRML MDRD: 66 ML/MIN/1.73M2
GLUCOSE BLD-MCNC: 159 MG/DL (ref 70–99)
HCT VFR BLD AUTO: 41.5 % (ref 40–53)
HGB BLD-MCNC: 13.3 G/DL (ref 13.3–17.7)
MCH RBC QN AUTO: 29.5 PG (ref 26.5–33)
MCHC RBC AUTO-ENTMCNC: 32 G/DL (ref 31.5–36.5)
MCV RBC AUTO: 92 FL (ref 78–100)
PLATELET # BLD AUTO: 206 10E3/UL (ref 150–450)
POTASSIUM BLD-SCNC: 4.1 MMOL/L (ref 3.4–5.3)
RBC # BLD AUTO: 4.51 10E6/UL (ref 4.4–5.9)
SODIUM SERPL-SCNC: 139 MMOL/L (ref 133–144)
TACROLIMUS BLD-MCNC: 5.5 UG/L (ref 5–15)
TME LAST DOSE: NORMAL H
TME LAST DOSE: NORMAL H
TSH SERPL DL<=0.005 MIU/L-ACNC: 1.89 MU/L (ref 0.4–4)
WBC # BLD AUTO: 4.8 10E3/UL (ref 4–11)

## 2021-09-07 PROCEDURE — 82306 VITAMIN D 25 HYDROXY: CPT | Performed by: FAMILY MEDICINE

## 2021-09-07 PROCEDURE — 80197 ASSAY OF TACROLIMUS: CPT | Performed by: INTERNAL MEDICINE

## 2021-09-07 PROCEDURE — 80048 BASIC METABOLIC PNL TOTAL CA: CPT | Performed by: PATHOLOGY

## 2021-09-07 PROCEDURE — 85027 COMPLETE CBC AUTOMATED: CPT | Performed by: PATHOLOGY

## 2021-09-07 PROCEDURE — 36415 COLL VENOUS BLD VENIPUNCTURE: CPT | Performed by: PATHOLOGY

## 2021-09-07 PROCEDURE — 84443 ASSAY THYROID STIM HORMONE: CPT | Performed by: PATHOLOGY

## 2021-09-07 PROCEDURE — 87799 DETECT AGENT NOS DNA QUANT: CPT | Performed by: INTERNAL MEDICINE

## 2021-09-08 LAB — BKV DNA # SPEC NAA+PROBE: NOT DETECTED COPIES/ML

## 2021-09-12 ENCOUNTER — HEALTH MAINTENANCE LETTER (OUTPATIENT)
Age: 66
End: 2021-09-12

## 2021-09-17 PROBLEM — M25.641 STIFFNESS OF JOINTS OF BOTH HANDS: Status: RESOLVED | Noted: 2021-06-01 | Resolved: 2021-09-17

## 2021-09-17 PROBLEM — M25.642 STIFFNESS OF JOINTS OF BOTH HANDS: Status: RESOLVED | Noted: 2021-06-01 | Resolved: 2021-09-17

## 2021-09-23 NOTE — TELEPHONE ENCOUNTER
Lab order sent to provider for review.  Adelina Vasquez, RN on 6/4/2021 at 8:11 AM   RTC in place  Pt notified to schedule lab draw.   RE  metFORMIN (GLUCOPHAGE-XR) 500 MG 24 hr tablet 360 tablet 3 6/3/2021  No   Sig - Route: Take 4 tablets (2,000 mg) by mouth daily (with breakfast) - Oral         May a detailed message be left on voicemail: yes      Reason for Call: Medication Question or concern regarding medication   Prescription Clarification  Name of Medication: metformin  Prescribing Provider: [facility]              Pharmacy: [id number] - [place], [place] - [address] AT [facility]              What on the order needs clarification? Per Lynne, pt has Chronic Renal Failure listed in file.  If so, Lynne needs eGFR due to dosing.  Please review to clarify and call Lynne back.  [phone]         Drowsy

## 2021-09-28 DIAGNOSIS — Z94.1 HEART REPLACED BY TRANSPLANT (H): ICD-10-CM

## 2021-09-29 RX ORDER — ATORVASTATIN CALCIUM 40 MG/1
TABLET, FILM COATED ORAL
Qty: 90 TABLET | Refills: 3 | Status: SHIPPED | OUTPATIENT
Start: 2021-09-29 | End: 2022-10-11

## 2021-10-04 ENCOUNTER — TELEPHONE (OUTPATIENT)
Dept: TRANSPLANT | Facility: CLINIC | Age: 66
End: 2021-10-04

## 2021-10-04 ENCOUNTER — PRE VISIT (OUTPATIENT)
Dept: TRANSPLANT | Facility: CLINIC | Age: 66
End: 2021-10-04

## 2021-10-04 DIAGNOSIS — Z94.1 HEART REPLACED BY TRANSPLANT (H): Primary | ICD-10-CM

## 2021-10-04 NOTE — TELEPHONE ENCOUNTER
Pt concerned about BP reading >140/90, not consistently but trending up.    Agree to follow with EARL Marks on 10/12 - will call if BP cont to increase and stay >140 prior.

## 2021-10-11 NOTE — NURSING NOTE
Transplant Coordinator Note  Reason for visit: BP management follow up. W/ lab, dexa, and cardiac MRI in afternoon.      Health concerns addressed today:   - last seen in June for 3rd annual. Called concerned that his BP is trending up; some readings now >140/90. Today's clinic reading 143/89. Amlodipine started.   ~CN late for her appointment so patient sent directly to hospital for cardiac MRI.   ~Pt encouraged to get flu vaccine in either pharmacy after cardiac MRI.     Immunosuppressants   mg BID   FK 1/0.5 mg; goal ~6  No DSAs  Immuknow 258, 135, 218   Allomaps 34, 27, 38, 37, 32, 38.     Routine screenings:    Derm: Due Aug 2021  Dental: Due  Colonoscopy: 2018 (diverticuli only)  Prostate: PSA 2.50   Eye: Done  Flu/Pneumonia: 2020 flu done; Shingrix done; COVID done. Pneumonia done.      Medication record reviewed and reconciled. Questions and concerns addressed. AVS reviewed and copy available in [employer]. Pt verbalized an understanding of plan of care.      Patient Instructions  ~Please call your coordinator at [phone] with any questions or concerns.    ~Coordinator will update you on remaining results   ~get Flu shot when able.   ~start amlodipine 2.5 mg once a day. Prescription sent to your local [facility].  ~Please let Lillie know if you experience any lower extremity swelling.

## 2021-10-12 ENCOUNTER — ANCILLARY PROCEDURE (OUTPATIENT)
Dept: BONE DENSITY | Facility: CLINIC | Age: 66
End: 2021-10-12
Attending: INTERNAL MEDICINE
Payer: MEDICARE

## 2021-10-12 ENCOUNTER — LAB (OUTPATIENT)
Dept: LAB | Facility: CLINIC | Age: 66
End: 2021-10-12
Attending: INTERNAL MEDICINE
Payer: MEDICARE

## 2021-10-12 ENCOUNTER — HOSPITAL ENCOUNTER (OUTPATIENT)
Dept: MRI IMAGING | Facility: CLINIC | Age: 66
End: 2021-10-12
Attending: INTERNAL MEDICINE
Payer: MEDICARE

## 2021-10-12 ENCOUNTER — OFFICE VISIT (OUTPATIENT)
Dept: CARDIOLOGY | Facility: CLINIC | Age: 66
End: 2021-10-12
Attending: INTERNAL MEDICINE
Payer: MEDICARE

## 2021-10-12 VITALS — DIASTOLIC BLOOD PRESSURE: 92 MMHG | HEART RATE: 83 BPM | SYSTOLIC BLOOD PRESSURE: 148 MMHG

## 2021-10-12 VITALS
HEIGHT: 68 IN | WEIGHT: 182.4 LBS | BODY MASS INDEX: 27.65 KG/M2 | SYSTOLIC BLOOD PRESSURE: 143 MMHG | DIASTOLIC BLOOD PRESSURE: 89 MMHG | HEART RATE: 85 BPM | OXYGEN SATURATION: 100 %

## 2021-10-12 DIAGNOSIS — Z94.1 HEART REPLACED BY TRANSPLANT (H): ICD-10-CM

## 2021-10-12 DIAGNOSIS — Z98.890 S/P CORONARY ANGIOGRAM: ICD-10-CM

## 2021-10-12 DIAGNOSIS — M81.0 OSTEOPOROSIS, UNSPECIFIED OSTEOPOROSIS TYPE, UNSPECIFIED PATHOLOGICAL FRACTURE PRESENCE: ICD-10-CM

## 2021-10-12 DIAGNOSIS — Z94.0 KIDNEY REPLACED BY TRANSPLANT: ICD-10-CM

## 2021-10-12 DIAGNOSIS — E11.9 TYPE 2 DIABETES MELLITUS WITHOUT COMPLICATION, WITHOUT LONG-TERM CURRENT USE OF INSULIN (H): ICD-10-CM

## 2021-10-12 DIAGNOSIS — Z48.298 AFTERCARE FOLLOWING ORGAN TRANSPLANT: ICD-10-CM

## 2021-10-12 DIAGNOSIS — Z79.899 ENCOUNTER FOR LONG-TERM CURRENT USE OF MEDICATION: ICD-10-CM

## 2021-10-12 LAB
ANION GAP SERPL CALCULATED.3IONS-SCNC: 8 MMOL/L (ref 3–14)
BUN SERPL-MCNC: 17 MG/DL (ref 7–30)
CALCIUM SERPL-MCNC: 9.2 MG/DL (ref 8.5–10.1)
CHLORIDE BLD-SCNC: 104 MMOL/L (ref 94–109)
CO2 SERPL-SCNC: 27 MMOL/L (ref 20–32)
CREAT SERPL-MCNC: 1.03 MG/DL (ref 0.66–1.25)
ERYTHROCYTE [DISTWIDTH] IN BLOOD BY AUTOMATED COUNT: 13.2 % (ref 10–15)
GFR SERPL CREATININE-BSD FRML MDRD: 75 ML/MIN/1.73M2
GLUCOSE BLD-MCNC: 168 MG/DL (ref 70–99)
HCT VFR BLD AUTO: 38.5 % (ref 40–53)
HGB BLD-MCNC: 12.9 G/DL (ref 13.3–17.7)
MAGNESIUM SERPL-MCNC: 1.4 MG/DL (ref 1.6–2.3)
MCH RBC QN AUTO: 29.8 PG (ref 26.5–33)
MCHC RBC AUTO-ENTMCNC: 33.5 G/DL (ref 31.5–36.5)
MCV RBC AUTO: 89 FL (ref 78–100)
PHOSPHATE SERPL-MCNC: 2.7 MG/DL (ref 2.5–4.5)
PLATELET # BLD AUTO: 196 10E3/UL (ref 150–450)
POTASSIUM BLD-SCNC: 3.7 MMOL/L (ref 3.4–5.3)
RBC # BLD AUTO: 4.33 10E6/UL (ref 4.4–5.9)
SODIUM SERPL-SCNC: 139 MMOL/L (ref 133–144)
TACROLIMUS BLD-MCNC: 5.9 UG/L (ref 5–15)
TME LAST DOSE: NORMAL H
TME LAST DOSE: NORMAL H
WBC # BLD AUTO: 4.5 10E3/UL (ref 4–11)

## 2021-10-12 PROCEDURE — 999N000128 HC STATISTIC PERIPHERAL IV START W/O US GUIDANCE

## 2021-10-12 PROCEDURE — 255N000002 HC RX 255 OP 636: Performed by: INTERNAL MEDICINE

## 2021-10-12 PROCEDURE — 93005 ELECTROCARDIOGRAM TRACING: CPT

## 2021-10-12 PROCEDURE — 36415 COLL VENOUS BLD VENIPUNCTURE: CPT | Performed by: PATHOLOGY

## 2021-10-12 PROCEDURE — 93016 CV STRESS TEST SUPVJ ONLY: CPT | Performed by: INTERNAL MEDICINE

## 2021-10-12 PROCEDURE — 85027 COMPLETE CBC AUTOMATED: CPT | Performed by: PATHOLOGY

## 2021-10-12 PROCEDURE — 87799 DETECT AGENT NOS DNA QUANT: CPT | Performed by: INTERNAL MEDICINE

## 2021-10-12 PROCEDURE — 93010 ELECTROCARDIOGRAM REPORT: CPT | Mod: 76 | Performed by: INTERNAL MEDICINE

## 2021-10-12 PROCEDURE — 84100 ASSAY OF PHOSPHORUS: CPT | Performed by: PATHOLOGY

## 2021-10-12 PROCEDURE — 75563 CARD MRI W/STRESS IMG & DYE: CPT | Mod: 26 | Performed by: INTERNAL MEDICINE

## 2021-10-12 PROCEDURE — 80197 ASSAY OF TACROLIMUS: CPT | Performed by: INTERNAL MEDICINE

## 2021-10-12 PROCEDURE — 77080 DXA BONE DENSITY AXIAL: CPT | Performed by: INTERNAL MEDICINE

## 2021-10-12 PROCEDURE — G0463 HOSPITAL OUTPT CLINIC VISIT: HCPCS | Mod: 25

## 2021-10-12 PROCEDURE — 999N000054 HC STATISTIC EKG NON-CHARGEABLE

## 2021-10-12 PROCEDURE — 250N000011 HC RX IP 250 OP 636: Performed by: INTERNAL MEDICINE

## 2021-10-12 PROCEDURE — A9585 GADOBUTROL INJECTION: HCPCS | Performed by: INTERNAL MEDICINE

## 2021-10-12 PROCEDURE — 83735 ASSAY OF MAGNESIUM: CPT | Performed by: PATHOLOGY

## 2021-10-12 PROCEDURE — 93017 CV STRESS TEST TRACING ONLY: CPT

## 2021-10-12 PROCEDURE — 80048 BASIC METABOLIC PNL TOTAL CA: CPT | Performed by: PATHOLOGY

## 2021-10-12 PROCEDURE — G1004 CDSM NDSC: HCPCS | Performed by: INTERNAL MEDICINE

## 2021-10-12 PROCEDURE — 75563 CARD MRI W/STRESS IMG & DYE: CPT | Mod: MG

## 2021-10-12 PROCEDURE — 93018 CV STRESS TEST I&R ONLY: CPT | Performed by: INTERNAL MEDICINE

## 2021-10-12 PROCEDURE — 99215 OFFICE O/P EST HI 40 MIN: CPT | Mod: 25 | Performed by: NURSE PRACTITIONER

## 2021-10-12 RX ORDER — GADOBUTROL 604.72 MG/ML
10 INJECTION INTRAVENOUS ONCE
Status: COMPLETED | OUTPATIENT
Start: 2021-10-12 | End: 2021-10-12

## 2021-10-12 RX ORDER — DIPHENHYDRAMINE HYDROCHLORIDE 50 MG/ML
25-50 INJECTION INTRAMUSCULAR; INTRAVENOUS
Status: DISCONTINUED | OUTPATIENT
Start: 2021-10-12 | End: 2021-10-13 | Stop reason: HOSPADM

## 2021-10-12 RX ORDER — CAFFEINE CITRATE 20 MG/ML
60 SOLUTION INTRAVENOUS
Status: DISCONTINUED | OUTPATIENT
Start: 2021-10-12 | End: 2021-10-13 | Stop reason: HOSPADM

## 2021-10-12 RX ORDER — AMINOPHYLLINE 25 MG/ML
100 INJECTION, SOLUTION INTRAVENOUS ONCE
Status: COMPLETED | OUTPATIENT
Start: 2021-10-12 | End: 2021-10-12

## 2021-10-12 RX ORDER — DIPHENHYDRAMINE HCL 25 MG
25 CAPSULE ORAL
Status: DISCONTINUED | OUTPATIENT
Start: 2021-10-12 | End: 2021-10-13 | Stop reason: HOSPADM

## 2021-10-12 RX ORDER — ONDANSETRON 2 MG/ML
4 INJECTION INTRAMUSCULAR; INTRAVENOUS
Status: DISCONTINUED | OUTPATIENT
Start: 2021-10-12 | End: 2021-10-13 | Stop reason: HOSPADM

## 2021-10-12 RX ORDER — ACYCLOVIR 200 MG/1
0-1 CAPSULE ORAL
Status: DISCONTINUED | OUTPATIENT
Start: 2021-10-12 | End: 2021-10-13 | Stop reason: HOSPADM

## 2021-10-12 RX ORDER — REGADENOSON 0.08 MG/ML
0.4 INJECTION, SOLUTION INTRAVENOUS ONCE
Status: COMPLETED | OUTPATIENT
Start: 2021-10-12 | End: 2021-10-12

## 2021-10-12 RX ORDER — DIAZEPAM 5 MG
5 TABLET ORAL EVERY 30 MIN PRN
Status: DISCONTINUED | OUTPATIENT
Start: 2021-10-12 | End: 2021-10-13 | Stop reason: HOSPADM

## 2021-10-12 RX ORDER — ALBUTEROL SULFATE 90 UG/1
2 AEROSOL, METERED RESPIRATORY (INHALATION) EVERY 5 MIN PRN
Status: DISCONTINUED | OUTPATIENT
Start: 2021-10-12 | End: 2021-10-13 | Stop reason: HOSPADM

## 2021-10-12 RX ORDER — AMLODIPINE BESYLATE 2.5 MG/1
2.5 TABLET ORAL DAILY
Qty: 90 TABLET | Refills: 3 | Status: SHIPPED | OUTPATIENT
Start: 2021-10-12 | End: 2021-10-26

## 2021-10-12 RX ORDER — METHYLPREDNISOLONE SODIUM SUCCINATE 125 MG/2ML
125 INJECTION, POWDER, LYOPHILIZED, FOR SOLUTION INTRAMUSCULAR; INTRAVENOUS
Status: DISCONTINUED | OUTPATIENT
Start: 2021-10-12 | End: 2021-10-13 | Stop reason: HOSPADM

## 2021-10-12 RX ADMIN — REGADENOSON 0.4 MG: 0.08 INJECTION, SOLUTION INTRAVENOUS at 14:24

## 2021-10-12 RX ADMIN — GADOBUTROL 10 ML: 604.72 INJECTION INTRAVENOUS at 15:01

## 2021-10-12 RX ADMIN — AMINOPHYLLINE 100 MG: 25 INJECTION, SOLUTION INTRAVENOUS at 14:26

## 2021-10-12 ASSESSMENT — MIFFLIN-ST. JEOR: SCORE: 1573.92

## 2021-10-12 ASSESSMENT — PAIN SCALES - GENERAL: PAINLEVEL: NO PAIN (0)

## 2021-10-12 NOTE — NURSING NOTE
Chief Complaint   Patient presents with     Follow Up     Heart transplant      Vitals were taken and medications reconciled.    Fantasma Newberry EMT  12:09 PM

## 2021-10-12 NOTE — PATIENT INSTRUCTIONS
Patient Instructions  ~Please call your coordinator at [phone] with any questions or concerns.    ~Coordinator will update you on remaining results   ~ Please get Flu shot when able.   ~start amlodipine 2.5 mg once a day. Prescription sent to your local [facility].   ~Let Lillie know if you experience lower extremity swelling.

## 2021-10-12 NOTE — PROGRESS NOTES
Pt arrived for cardiac MRI with stress. Allergies, medications, and safety checklist reviewed with patient. Test explained and all questions were answered. Denies caffeine intake. Lungs are clear. Lexiscan 0.4 mg given over 10 seconds followed by 5 mL of saline. Pt tolerated the scan, medications, and contrast well. Pre and post EKG completed. Pt monitored after MRI and escorted back to [facility] waiting room.

## 2021-10-12 NOTE — PROGRESS NOTES
ADULT HEART TRANSPLANT CLINIC  October 12, 2021    HPI:   Mr. Murray Nicholson is a 66yr old male with a history of NICM s/p OHT 6/14/18, HTN, HL, DMII, and ESRD (on hemodialysis since fall 2017, now s/p LDKT 12/2019) who presents to clinic for routine follow up.     His post-transplant course has been extremely complicated, and includes:  - leukocytosis  - RIJ thrombus infected with CoNS  - incidental pneumoperitoneum  - neutropenia  - oral mucosal ulcer secondary to neutropenia with Klebsiella infection  - pseudomonas bacteremia  - perforation of the small bowel, s/p small bowel resection and ileostomy, now s/p ileostomy takedown 3/27/19  - end-stage renal disease requiring hemodialysis since 2017, now s/p renal transplant 12/2019    Rejection history:  none  AlloMap scores:  Last was 38 (6/2021)  DSAs:  none  Coronary angio/Ischemic eval:  His angiograms have shown showed donor coronary disease in all three coronary arteries.  Last coronary angiogram 6/2021 showed 40% stenosis in the pLAD, 50% stenosis in the first obtuse marginal branch, 60-70% stenosis in the second obtuse marginal branch, 20% stenosis in the pRCA, 40% stenosis in the dRCA, and 20% stenosis in the RPDA.  Last RHC:  6/2021 showed normal biventricular filling pressures, with RA 5, mPA 20, PCW 12, and CI 3.62.  Echo/cMRI:  TTE 12/2020 showed stable graft function, with LVEF 65-70% and normal RV size/function.    Since last visit, he states that he has been doing well.  He is working full-time and notes good energy.  His blood pressures have been rising, ranging 110-160s/70-100s.  He has been walking a lot, up to 8 miles/day.  His breathing has been good, and he denies SOB, PND, and orthopnea.  His appetite has been good, and he is eating regularly without nausea, vomiting, diarrhea, and constipation.  His weight has been stable, and he denies fluid retention.  His weight has been ranging 179-181#.  He has had some headaches since his blood pressures  have been higher, for which he takes Tylenol with some relief.  He otherwise denies chest pain, palpitations, dizziness, falls, acute vision changes, fevers, chills, cough, sore throat, and signs of bleeding.      Serostatus:  CMV D+/R-  EBV D+/R+  Toxo D?/R-      Patient Active Problem List    Diagnosis Date Noted     Decreased hearing, unspecified laterality 08/23/2021     Priority: Medium     Erectile dysfunction, unspecified erectile dysfunction type 08/22/2021     Priority: Medium     Nocturia 08/22/2021     Priority: Medium     History of hernia repair 08/22/2021     Priority: Medium     Need for pneumocystis prophylaxis 06/28/2020     Priority: Medium     Aftercare following organ transplant 12/24/2019     Priority: Medium     HTN, kidney transplant related 12/24/2019     Priority: Medium     Secondary renal hyperparathyroidism (H) 12/24/2019     Priority: Medium     Vitamin D deficiency 12/24/2019     Priority: Medium     Hypomagnesemia 12/24/2019     Priority: Medium     SHEELA on CPAP 12/24/2019     Priority: Medium     Kidney replaced by transplant 12/19/2019     Priority: Medium     Pancreatic cyst 11/13/2019     Priority: Medium     Immunosuppression (H)      Priority: Medium     Type 2 diabetes mellitus (H)      Priority: Medium     Heart replaced by transplant (H) 12/10/2018     Priority: Medium     Added automatically from request for surgery [id number]       Dyslipidemia      Priority: Medium     MGUS (monoclonal gammopathy of unknown significance)      Priority: Medium     Ascending aortic aneurysm (H)      Priority: Medium     Anemia in chronic renal disease 04/12/2013     Priority: Medium     Problem list name updated by automated process. Provider to review and confirm       Allergic rhinitis 04/05/2006     Priority: Medium     Problem list name updated by automated process. Provider to review       Esophageal reflux 08/12/2004     Priority: Medium       PAST MEDICAL HISTORY:  Past Medical History:    Diagnosis Date     (HFpEF) heart failure with preserved ejection fraction (H)      Allergic rhinitis, cause unspecified      Anemia of chronic kidney failure      Aortic stenosis 8/13/2008    Overview:  Bicuspid aortic valve     AS (aortic stenosis)      Ascending aortic aneurysm (H)      Bicuspid aortic valve      Bilateral hand pain 6/1/2021     CAD (coronary artery disease)      Congestive heart failure, unspecified      Coronary artery disease involving native artery of transplanted heart without angina pectoris 7/10/2014     Dialysis patient (H)     Tues-Thur-Sat     Dyslipidemia      Esophageal reflux      ESRD (end stage renal disease) (H)      Hearing problem      Heart replaced by transplant (H) 12/10/2018     Hypersomnia with sleep apnea, unspecified      Hypertension      Ileostomy status (H)      Immunosuppression (H)      MGUS (monoclonal gammopathy of unknown significance)      Mitral regurgitation      SHEELA (obstructive sleep apnea)     No CPAP     Pneumonia      Sigmoid diverticulitis 8/14/2018     Status post coronary angiogram 6/28/2019     Systolic heart failure (H)      Type 2 diabetes mellitus (H)        CURRENT MEDICATIONS:  Prescription Medications as of 10/12/2021       Rx Number Disp Refills Start End Last Dispensed Date Next Fill Date Owning Pharmacy    aspirin 81 MG EC tablet            Sig: Take 81 mg by mouth daily    Class: Historical    Route: Oral    atorvastatin (LIPITOR) 40 MG tablet  90 tablet 3 9/29/2021    [id number] - [place], [place] - [address]    Sig: TAKE 1 TABLET(40 MG) BY MOUTH DAILY    Class: E-Prescribe    biotin 1000 MCG TABS tablet        [id number] - [place], [place] - [address]    Sig: Take 1,000 mcg by mouth daily Patient takes every other day    Class: Historical    Route: Oral    blood glucose (ACCU-CHEK GUIDE) test strip  300 strip 3 11/17/2020    [id number] - [place], [place] - [address] AT [facility]    Sig: Use to test blood sugar 3 times daily or as directed.    Class: E-Prescribe    Cosign for Ordering: Accepted by Mariangel Merida MD on 12/14/2020  5:08 PM    blood glucose monitoring (ACCU-CHEK FASTCLIX) lancets  300 each 3 11/17/2020    [id number] - [place], [place] - [address] [place] [address] AVE AT [facility]    Sig: USE TO TEST 3 TIMES DAILY OR AS DIRECTED.    Class: E-Prescribe    Cosign for Ordering: Accepted by Mariangel Merida MD on 12/14/2020  5:08 PM    calcium citrate and vitamin D (CITRACAL) 200-250 MG-UNIT TABS per tablet        [id number] [place] [place] [facility] [address] [facility]    Sig: Take 1 tablet by mouth 2 times daily    Class: Historical    Route: Oral    famotidine (PEPCID) 20 MG tablet  180 tablet 3 8/19/2021    [id number] - [place], [place] - [address] [facility]    Sig: Take 1 tablet (20 mg) by mouth 2 times daily    Class: E-Prescribe    Route: Oral    fluticasone (FLONASE) 50 MCG/ACT nasal spray  16 g 11 2/10/2021    [id number] [place] [place] [facility] [address] [facility]    Sig: SHAKE LIQUID AND USE 1 SPRAY IN EACH NOSTRIL DAILY    Class: E-Prescribe    loratadine (CLARITIN) 10 MG tablet        [facility] #[id number] - [place], [place] - [address] [facility]    Sig: Take 10 mg by mouth daily Patient takes at bedtime    Class: Historical    Route: Oral    metFORMIN (GLUCOPHAGE-XR) 500 MG 24 hr tablet  360 tablet 3 6/3/2021    [id number] - [place], [place] - [address] AT [facility]    Sig: Take 4 tablets (2,000 mg) by mouth daily (with breakfast)    Class: E-Prescribe    Route: Oral    multivitamin (CENTRUM SILVER) tablet            Sig: Take 1 tablet by mouth daily    Class: Historical    Route: Oral     mycophenolate (GENERIC EQUIVALENT) 250 MG capsule  180 capsule 11 4/20/2021    [id number] - [place], [place] - [address] [facility]    Sig: Take 3 capsules (750 mg) by mouth 2 times daily    Class: E-Prescribe    Route: Oral    sulfamethoxazole-trimethoprim (BACTRIM) 400-80 MG tablet  30 tablet 11 2/12/2021    [id number] - [place], [place] - [address] [facility]    Sig: Take 1 tablet by mouth daily    Class: E-Prescribe    Route: Oral    tacrolimus (GENERIC EQUIVALENT) 0.5 MG capsule  90 capsule 3 8/30/2021    [facility] #[id number] - [place], [place] - [address] [facility]    Sig: Take ONE cap every PM (0.5 mg every evening)    Class: E-Prescribe    Notes to Pharmacy: 90 day supple if able    tacrolimus (GENERIC EQUIVALENT) 1 MG capsule  90 capsule 3 8/30/2021    [id number] - [place], [place] - [address] [facility]    Sig: TAKE 1 CAPSULE BY MOUTH EVERY MORNING    Class: E-Prescribe    Notes to Pharmacy: 90 day supply if able    Zinc Sulfate (ZINC 15 PO)            Class: Historical    Route: Oral      Clinic-Administered Medications as of 10/12/2021       Dose Frequency Start End    diatrizoate meglumine-sodium (GASTROGRAFIN/GASTROVIEW) 66-10 % solution 480 mL 480 mL ONCE 3/26/2019     Admin Instructions: MUST DILUTE before giving.  For every 5 mL of Gastroview, dilute with 8 oz water or non-pulp juice.    Route: Rectal          ROS:   Constitutional: No fever, chills, or sweats.  Stable weight.   ENT: No visual disturbance, ear ache, epistaxis, sore throat.   Allergies/Immunologic: Negative.   Respiratory: As per HPI.   Cardiovascular: As per HPI.   GI: No nausea, vomiting, hematemesis, melena, or hematochezia.   : No urinary frequency, dysuria, or hematuria.   Integument: Negative.   Psychiatric: Negative.   Neuro: Negative.   Endocrinology: Negative.  "  Musculoskeletal: Negative    Exam:  BP (!) 143/89 (BP Location: Left arm, Patient Position: Chair, Cuff Size: Adult Regular)   Pulse 85   Ht 1.715 m (5' 7.5\")   Wt 82.7 kg (182 lb 6.4 oz)   SpO2 100%   BMI 28.15 kg/m    In general, the patient is a pleasant male in no apparent distress.    HEENT: NC/AT. PERRLA. EOMI.  Sclerae white, not injected.    Neck:  No adenopathy, No thyromegaly.    COR: No jugular venous distention when sitting upright.  RRR.  Normal S1 S2 splits physiologically.  No murmur, rub click, or gallop.    Lungs:  CTA. No rhonchi.    Abdomen: soft, nontender, nondistended.  No organomegaly.  Extremities:  No clubbing, cyanosis, or LE edema.    Neuro: Alert & Oriented x 3, grossly non focal.  Integument: No open lesions, rashes, or jaundice.    Labs:  CBC RESULTS:   Lab Results   Component Value Date    WBC 4.5 10/12/2021    WBC 4.3 06/10/2021    RBC 4.33 (L) 10/12/2021    RBC 4.21 (L) 06/10/2021    HGB 12.9 (L) 10/12/2021    HGB 12.6 (L) 06/10/2021    HCT 38.5 (L) 10/12/2021    HCT 37.9 (L) 06/10/2021    MCV 89 10/12/2021    MCV 90 06/10/2021    MCH 29.8 10/12/2021    MCH 29.9 06/10/2021    MCHC 33.5 10/12/2021    MCHC 33.2 06/10/2021    RDW 13.2 10/12/2021    RDW 13.1 06/10/2021     10/12/2021     06/10/2021       BMP RESULTS:  Lab Results   Component Value Date     10/12/2021     07/06/2021    POTASSIUM 3.7 10/12/2021    POTASSIUM 3.9 07/06/2021    CHLORIDE 104 10/12/2021    CHLORIDE 105 07/06/2021    CO2 27 10/12/2021    CO2 24 07/06/2021    ANIONGAP 8 10/12/2021    ANIONGAP 10 07/06/2021     (H) 10/12/2021     (H) 07/06/2021    BUN 17 10/12/2021    BUN 18 07/06/2021    CR 1.03 10/12/2021    CR 1.09 07/06/2021    GFRESTIMATED 75 10/12/2021    GFRESTIMATED 70 07/06/2021    GFRESTBLACK 81 07/06/2021    TRINI 9.2 10/12/2021    TRINI 9.3 07/06/2021      LIPID RESULTS:  Lab Results   Component Value Date    CHOL 120 06/07/2021    HDL 49 06/07/2021    LDL 28 " 06/07/2021    TRIG 215 (H) 06/07/2021    CHOLHDLRATIO 5.0 08/10/2015    NHDL 71 06/07/2021       IMMUNOSUPPRESSANT LEVELS  Lab Results   Component Value Date    TACROL 5.5 09/07/2021    TACROL 6.8 07/06/2021    DOSTAC 9/6/2021 09/07/2021    DOSTAC 925PM 7/5/21 07/06/2021       No components found for: CK  Lab Results   Component Value Date    MAG 1.4 (L) 10/12/2021    MAG 1.3 (L) 06/07/2021     Lab Results   Component Value Date    A1C 6.2 (H) 06/07/2021     Lab Results   Component Value Date    PHOS 2.7 10/12/2021    PHOS 3.1 06/07/2021     Lab Results   Component Value Date    NTBNPI >175,000 (H) 07/26/2018     Lab Results   Component Value Date    SAITESTMET SA FCS 07/06/2021    SAICELL Class I 07/06/2021    [id number] None 07/06/2021    [id number] None 07/06/2021    SAIREPCOM  07/06/2021      Test performed by modified procedure. Serum heat inactivated and tested   by a modified ([place]) protocol including fetal calf serum addition.   High-risk, mfi >3,000. Mod-risk, mfi 500-3,000.       Lab Results   Component Value Date    SAIITESTME SA FCS 07/06/2021    SAIICELL Class II 07/06/2021    [id number] None 07/06/2021    [id number] None 07/06/2021    SAIIREPCOM  07/06/2021      Test performed by modified procedure. Serum heat inactivated and tested   by a modified ([place]) protocol including fetal calf serum addition.   High-risk, mfi >3,000. Mod-risk, mfi 500-3,000.       Lab Results   Component Value Date    CSPEC Plasma 06/07/2021       Assessment and Plan:  Mr. Murray Nicholson is a 66yr old male with a history of NICM s/p OHT 6/14/18, HTN, HL, DMII, and ESRD (on hemodialysis since fall 2017, now s/p LDKT 12/2019) who presents to clinic for routine follow up.     Labs today show stable electrolytes, renal function, and blood counts.  Tacrolimus level in process.    DEXA scan results are in process.  He will have a cMRI following today's visit.    Mr. Nicholson appears well today.  His weight is stable, and he appears  euvolemic.  His blood pressures are up slightly, so advised that he start amlodipine 2.5mg once daily.  Asked that he continue to check his blood pressures regularly, and call if they persist > 140/90.    We will plan for him to follow-up in clinic per protocol, or sooner should new concerns arise.    DIRK, s/p OHT 6/14/18  His post-transplant course has been extremely complicated, and includes:  - leukocytosis  - RIJ thrombus infected with CoNS  - incidental pneumoperitoneum  - neutropenia  - oral mucosal ulcer secondary to neutropenia with Klebsiella infection  - pseudomonas bacteremia  - perforation of the small bowel, s/p small bowel resection and ileostomy, now s/p ileostomy takedown 3/27/19  - end-stage renal disease requiring hemodialysis since 2017, now s/p renal transplant 12/2019    Rejection history:  none  AlloMap scores:  Last was 38 (6/2021)  DSAs:  none  Coronary angio/Ischemic eval:  His angiograms have shown showed donor coronary disease in all three coronary arteries.  Last coronary angiogram 6/2021 showed 40% stenosis in the pLAD, 50% stenosis in the first obtuse marginal branch, 60-70% stenosis in the second obtuse marginal branch, 20% stenosis in the pRCA, 40% stenosis in the dRCA, and 20% stenosis in the RPDA.  Last RHC:  6/2021 showed normal biventricular filling pressures, with RA 5, mPA 20, PCW 12, and CI 3.62.  Echo/cMRI:  TTE 12/2020 showed stable graft function, with LVEF 65-70% and normal RV size/function.      Serostatus:  - CMV D+/R-  - EBV D+/R+  - Toxo D?/R-     Immunosuppression:  - Mycophenolate 750mg twice daily  - tacro, goal level 6-8.  Level today in process.     PPx:  - CAV:  Aspirin 81mg daily and atorvastatin 40mg daily  - GI:   Famotidine 20mg twice daily  - Osteoporosis:  Calcium/vitamin D supplements  - PCP:  On bactrim indefinitely per renal team     Graft function:  - BPs:   Elevated, starting amlodipine 2.5mg daily  - fluid status:  Euvolemic, not on  "diuretics     Health maintenance:  - Completed Covid vaccines  - Completed Shingrix vaccines  - needs flu shot  - pneumo shots UTD  - Derm exam UTD  - Eye exam UTD  - Dental exam overdue  - last [place] 2018 noted diverticuli, due [id number]     ESRD s/p renal transplant (12/2019)  Follows with renal team, creatinine remains stable.     DMII  Follows with endocrine, on metformin.     Pancreatic cyst surveillance  MRI 4/2019 showed \"numerous pancreatic cystic foci are similar to those seen on 4/19/2018 with minimal increase in size of one of these cystic foci arising from the pancreatic body (now measuring 14mm, previously 13mm). These remain most consistent with side branch intraductal papillary mucinous neoplasms. No suspicious features on this unenhanced exam.\"  Repeat MR abdomen wMRCP 3/2020 showed \"Re-demonstration of the multiple pancreatic cystic lesions, one of  which has slightly increased in size compared to prior exam now measuring 17 mm compared to 14 mm on prior exam. No suspicious features associated within this lesion on today's exam. Although findings may represent side branch ductal papillary mucinous neoplasm, given the slightly greater than 20% growth on today's exam, consider GI consultation for possible endoscopic ultrasound evaluation as per guidelines below.\"    Plan to repeat abdominal MRI q1-2 years per GI -- appears to be due now, and unclear if he has seen GI --> will review with Dr. Ernst.        The above was reviewed with Mr. Nicholson, who verbalized understanding and will call with further questions/concerns.    45 minutes spent with patient, along with preparing for visit, reviewing follow up, and completing documentation.      Cleo Marks, FRANK, FNP-BC, CHFN  Advanced Heart Failure Nurse Practitioner  [facility]  Patient Care Team:  Emilia Knutson MD as PCP - General (Family Practice)  Kulwant, Arcelia Mata MD as MD (Cardiology)  Pawel, " MAYTE Scruggs as Physician Assistant (Physician Assistant)  Jaky Canela as Clinic Care Coordinator  Ana Luisa Mcpherson MD as MD (Nephrology)  Lillie Ulloa RN as Transplant Coordinator  [facility] ([facility] ([place]  Abram Moulton MD as MD (Family Practice)  Eduardo Lea formerly [facility] as Pharmacist (Pharmacist)  Mariangel Merida MD as MD (INTERNAL MEDICINE - ENDOCRINOLOGY, DIABETES & METABOLISM)  Mariangel Merida MD as Assigned Endocrinology Provider  Klever Ernst MD as Assigned Heart and Vascular Provider  Marie Narvaez MD as Assigned PCP  SELF, REFERRED

## 2021-10-13 LAB — BKV DNA # SPEC NAA+PROBE: NOT DETECTED COPIES/ML

## 2021-10-14 LAB
ATRIAL RATE - MUSE: 76 BPM
ATRIAL RATE - MUSE: 83 BPM
DIASTOLIC BLOOD PRESSURE - MUSE: NORMAL MMHG
DIASTOLIC BLOOD PRESSURE - MUSE: NORMAL MMHG
INTERPRETATION ECG - MUSE: NORMAL
INTERPRETATION ECG - MUSE: NORMAL
P AXIS - MUSE: 40 DEGREES
P AXIS - MUSE: 49 DEGREES
PR INTERVAL - MUSE: 186 MS
PR INTERVAL - MUSE: 188 MS
QRS DURATION - MUSE: 82 MS
QRS DURATION - MUSE: 92 MS
QT - MUSE: 358 MS
QT - MUSE: 390 MS
QTC - MUSE: 420 MS
QTC - MUSE: 438 MS
R AXIS - MUSE: 49 DEGREES
R AXIS - MUSE: 62 DEGREES
SYSTOLIC BLOOD PRESSURE - MUSE: NORMAL MMHG
SYSTOLIC BLOOD PRESSURE - MUSE: NORMAL MMHG
T AXIS - MUSE: 41 DEGREES
T AXIS - MUSE: 56 DEGREES
VENTRICULAR RATE- MUSE: 76 BPM
VENTRICULAR RATE- MUSE: 83 BPM

## 2021-10-18 ENCOUNTER — PATIENT OUTREACH (OUTPATIENT)
Dept: GASTROENTEROLOGY | Facility: CLINIC | Age: 66
End: 2021-10-18

## 2021-10-18 ENCOUNTER — PREP FOR PROCEDURE (OUTPATIENT)
Dept: GASTROENTEROLOGY | Facility: CLINIC | Age: 66
End: 2021-10-18

## 2021-10-18 DIAGNOSIS — K86.2 PANCREAS CYST: Primary | ICD-10-CM

## 2021-10-18 NOTE — TELEPHONE ENCOUNTER
Called patient to follow up on recommended EUS, delayed by pandemic scheduling.    Procedure/Imaging/Clinic: EUS  Physician: Dr. Don            Timin/8  Procedure length:50 minutes  Anesthesia:general  Dx: pancreatic cyst  [facility]: [facility]      Called to discuss with patient. Explained they can expect a call from  for date and time of procedure, will need a , someone to stay with them for 24 hours and should stay in town for 24 hours (within 45 min of Hospital) post procedure    Patient needs to get pre-op physical completed. If outside [facility] system will need physical faxed to number [phone]   If you do not get a preop physical, your procedure could be cancelled, patient voiced understanding*    Preop Plan:PCP will do    Med Review    Blood thinner -  no  ASA - 81 mg can continue  Diabetic - no    COVID test discussed:3-4     Patient Education r/t procedure:done    Does patient have any history of gastric bypass/gastric surgery/altered panc/bili anatomy?no    A pre-op nurse will call 1-2 days prior to the procedure. I    NPO/Prep: not discussed    Other specific details/comments:none     Verbalized understanding of all instructions. All questions answered.

## 2021-10-21 DIAGNOSIS — Z11.59 ENCOUNTER FOR SCREENING FOR OTHER VIRAL DISEASES: ICD-10-CM

## 2021-10-21 NOTE — TELEPHONE ENCOUNTER
FUTURE VISIT INFORMATION      FUTURE VISIT INFORMATION:    Date: 10/28/2021    Time: 1:30PM    Location: [facility]  REFERRAL INFORMATION:    Referring provider:  Dr Tristan Bañuelos     Referring providers clinic:  [facility] [place]     Reason for visit/diagnosis  New Pt, Hearing Loss per Tristan Bañuelos MD, MDR in [facility] prior    RECORDS REQUESTED FROM:       Clinic name Comments Records Status Imaging Status   [facility] [place]  2/26/2019 note from Dr Bañuelos   2/15/2019 audiogram  [facility]    Imaging 10/5/2018 CT Facial Bone  7/26/2018 CT Head  5/17/2010 MRI Brain  [facility] PACS

## 2021-10-21 NOTE — PATIENT INSTRUCTIONS
1. You were seen in the ENT Clinic today by Dr. Godwin.  If you have any questions or concerns after your appointment, please call   - Option 1: ENT Clinic: [phone]   - Option 2: Amrita (Dr. Godwin's Nurse): [phone]         Mellisa(Dr. Godwin's Nurse): [phone]    2.   Plan to return to clinic after Imaging    3. MRI temporal bone/ IAC    4. CROS hearing aid consult    Amrita Winchester LPN  [facility] - Otolaryngology    The patient presents with a history of asymmetric sensorineural hearing loss in the left ear. He will be referred for an MRI scan of the head to assess for retrocochlear pathology and he will be referred an Audiology consultation for possible CROS hearing amplification. He will be seen again after his MRI scan.

## 2021-10-26 DIAGNOSIS — Z94.1 HEART REPLACED BY TRANSPLANT (H): ICD-10-CM

## 2021-10-26 RX ORDER — AMLODIPINE BESYLATE 2.5 MG/1
5 TABLET ORAL DAILY
Qty: 180 TABLET | Refills: 3 | Status: SHIPPED | OUTPATIENT
Start: 2021-10-26 | End: 2022-06-21

## 2021-10-28 ENCOUNTER — OFFICE VISIT (OUTPATIENT)
Dept: OTOLARYNGOLOGY | Facility: CLINIC | Age: 66
End: 2021-10-28
Payer: MEDICARE

## 2021-10-28 ENCOUNTER — OFFICE VISIT (OUTPATIENT)
Dept: AUDIOLOGY | Facility: CLINIC | Age: 66
End: 2021-10-28
Attending: OTOLARYNGOLOGY
Payer: MEDICARE

## 2021-10-28 ENCOUNTER — PRE VISIT (OUTPATIENT)
Dept: OTOLARYNGOLOGY | Facility: CLINIC | Age: 66
End: 2021-10-28

## 2021-10-28 VITALS
OXYGEN SATURATION: 100 % | DIASTOLIC BLOOD PRESSURE: 93 MMHG | TEMPERATURE: 97.8 F | HEART RATE: 95 BPM | WEIGHT: 181 LBS | HEIGHT: 67 IN | SYSTOLIC BLOOD PRESSURE: 157 MMHG | BODY MASS INDEX: 28.41 KG/M2

## 2021-10-28 DIAGNOSIS — H91.90 HEARING LOSS: ICD-10-CM

## 2021-10-28 DIAGNOSIS — H90.3 ASYMMETRIC SNHL (SENSORINEURAL HEARING LOSS): Primary | ICD-10-CM

## 2021-10-28 DIAGNOSIS — H90.3 SENSORY HEARING LOSS, BILATERAL: Primary | ICD-10-CM

## 2021-10-28 PROCEDURE — 92550 TYMPANOMETRY & REFLEX THRESH: CPT | Performed by: AUDIOLOGIST

## 2021-10-28 PROCEDURE — 92557 COMPREHENSIVE HEARING TEST: CPT | Performed by: AUDIOLOGIST

## 2021-10-28 PROCEDURE — 99213 OFFICE O/P EST LOW 20 MIN: CPT | Performed by: OTOLARYNGOLOGY

## 2021-10-28 ASSESSMENT — MIFFLIN-ST. JEOR: SCORE: 1559.64

## 2021-10-28 ASSESSMENT — PAIN SCALES - GENERAL: PAINLEVEL: NO PAIN (0)

## 2021-10-28 NOTE — NURSING NOTE
"Chief Complaint   Patient presents with     Consult     follow up      Blood pressure (!) 157/93, pulse 95, temperature 97.8  F (36.6  C), height 1.702 m (5' 7\"), weight 82.1 kg (181 lb), SpO2 100 %.    Manfred Ramirez LPN    "

## 2021-10-28 NOTE — LETTER
10/28/2021       RE: Murray Nicholson  [address]  [place] [address]     Dear Colleague,    Thank you for referring your patient, Murray Nicholson, to the [facility] [place] at [facility]. Please see a copy of my visit note below.    The patient presents with a history of severe sensorineural hearing loss in the right ear that occurred suddenly more than ten years ago. He had difficulty with dizziness at the time of the event, but he denies dizziness issues since that time. The patient denies sinusitis, rhinitis, facial pain, nasal obstruction or purulent nasal discharge. The patient denies chronic or recurrent tonsillitis, chronic or recurrent pharyngitis. The patient denies otalgia, otorrhea, eustachian tube dysfunction or ear infections.     His Audiogram and Tympanogram demonstrates a profound sensorineural hearing loss in the left ear with 0% word recognition. His right ear hearing is within the normal range with 100% word recognition. His tympanograms are normal bilaterally.       This patient is seen in consultation at the request of Dr. Tristan Bañuelos.    All other systems were reviewed and they are either negative or they are not directly pertinent to this Otolaryngology examination.      Past Medical History:    Past Medical History:   Diagnosis Date     (HFpEF) heart failure with preserved ejection fraction (H)      Allergic rhinitis, cause unspecified      Anemia of chronic kidney failure      Aortic stenosis 8/13/2008    Overview:  Bicuspid aortic valve     AS (aortic stenosis)      Ascending aortic aneurysm (H)      Bicuspid aortic valve      Bilateral hand pain 6/1/2021     CAD (coronary artery disease)      Congestive heart failure, unspecified      Coronary artery disease involving native artery of transplanted heart without angina pectoris 7/10/2014     Dialysis patient (H)     Tues-Thur-Sat      Dyslipidemia      Esophageal reflux      ESRD (end stage renal disease) (H)      Hearing problem      Heart replaced by transplant (H) 12/10/2018     Hypersomnia with sleep apnea, unspecified      Hypertension      Ileostomy status (H)      Immunosuppression (H)      MGUS (monoclonal gammopathy of unknown significance)      Mitral regurgitation      SHEELA (obstructive sleep apnea)     No CPAP     Pneumonia      Sigmoid diverticulitis 8/14/2018     Status post coronary angiogram 6/28/2019     Systolic heart failure (H)      Type 2 diabetes mellitus (H)        Past Surgical History:    Past Surgical History:   Procedure Laterality Date     CARDIAC SURGERY       COLONOSCOPY N/A 5/3/2018    Procedure: COLONOSCOPY;  colonoscopy ;  Surgeon: Ammon Castillo MD;  [facility]     CREATE FISTULA ARTERIOVENOUS UPPER EXTREMITY BOVINE Left 5/8/2019    Procedure: Left Upper Extremity Arteriovenous Bovine Graft Creation;  Surgeon: Calin Cheney MD;  [facility]     CV CORONARY ANGIOGRAM N/A 6/28/2019    Procedure: CV CORONARY ANGIOGRAM;  Surgeon: Montrell Posada MD;  [facility]     CV CORONARY ANGIOGRAM N/A 7/15/2020    Procedure: CV CORONARY ANGIOGRAM;  Surgeon: Marcelo Ramírez MD;  [facility]     CV CORONARY ANGIOGRAM N/A 6/7/2021    Procedure: CV CORONARY ANGIOGRAM;  Surgeon: Gilberto Mccann MD;  [facility]     CV HEART BIOPSY N/A 2/1/2019    Procedure: HBX;  Surgeon: Montrell Posada MD;  [facility]     CV HEART BIOPSY N/A 3/22/2019    Procedure: HBX, RIJV ACCESS;  Surgeon: Jordan Fox MD;  [facility]     CV HEART BIOPSY N/A 6/28/2019    Procedure: CV HEART BIOPSY;  Surgeon: Montrell Posada MD;  [facility]     CV HEART BIOPSY N/A 10/28/2019    Procedure: CV HEART BIOPSY;  Surgeon: Marcelo Ramírez MD;  [facility]     CV HEART BIOPSY N/A 2/6/2020    Procedure: CV HEART BIOPSY;  Surgeon: Montrell Posada MD;  [facility]     CV HEART BIOPSY N/A 7/15/2020    Procedure: CV HEART BIOPSY;  Surgeon: Marcelo Ramírez MD;  [facility]     CV RIGHT HEART CATH MEASUREMENTS RECORDED N/A 6/28/2019    Procedure: CV RIGHT HEART CATH;  Surgeon: Montrell Posada MD;  [facility]     CV RIGHT HEART CATH MEASUREMENTS RECORDED N/A 7/15/2020    Procedure: CV RIGHT HEART CATH;  Surgeon: Marcelo Ramírez MD;  [facility]     CV RIGHT HEART CATH MEASUREMENTS RECORDED N/A 6/7/2021    Procedure: CV RIGHT HEART CATH;  Surgeon: Gilberto Mccann MD;  [facility]     ESOPHAGOSCOPY, GASTROSCOPY, DUODENOSCOPY (EGD), COMBINED N/A 5/7/2018    Procedure: COMBINED ENDOSCOPIC ULTRASOUND, ESOPHAGOSCOPY, GASTROSCOPY, DUODENOSCOPY (EGD), FINE NEEDLE ASPIRATE/BIOPSY;  Endoscopic Ultrasound with Fine Needle Aspiration ;  Surgeon: Alon Don MD;  [facility]     EXAM UNDER ANESTHESIA RECTUM N/A 8/12/2018    Procedure: EXAM UNDER ANESTHESIA RECTUM;  EXAM UNDER ANESTHESIA RECTUM ,COMBINED INCISION AND DRAINAGE OF RECTAL ABCESS ;  Surgeon: Rick Tran MD;  [facility]     INCISION AND DRAINAGE RECTUM, COMBINED N/A 8/12/2018    Procedure: COMBINED INCISION AND DRAINAGE RECTUM;;  Surgeon: Rick Tran MD;  [facility]     IR DIALYSIS FISTULOGRAM LEFT  9/25/2019     IR DIALYSIS FISTULOGRAM LEFT  11/22/2019     IR DIALYSIS PTA  9/25/2019     IR DIALYSIS PTA  11/22/2019     LAPAROSCOPIC ASSISTED COLOSTOMY TAKEDOWN N/A 12/11/2018    Procedure: Laparoscopic Assisted Colostomy Takedown, Laparoscopic Lysis of Adhesions;  Surgeon: Rick Tran MD;  [facility]     LAPAROSCOPIC ASSISTED SIGMOID COLECTOMY N/A 8/14/2018    Procedure: LAPAROSCOPIC ASSISTED SIGMOID COLECTOMY;   Laparoscopic Hand Assisted Takedown of Splenic Flexure, Sigmoidectomy, Small Bowel Resection, Takedown of Small Bowel to Colon Fistula;  Surgeon: Rick Tran MD;  [facility]     LAPAROSCOPIC HERNIORRHAPHY INGUINAL BILATERAL Bilateral 7/24/2015    Procedure: LAPAROSCOPIC HERNIORRHAPHY INGUINAL BILATERAL;  Surgeon: Bobby Mcconnell MD;  [facility]     LAPAROSCOPIC INSERTION CATHETER PERITONEAL DIALYSIS N/A 6/22/2017    Procedure: LAPAROSCOPIC INSERTION CATHETER PERITONEAL DIALYSIS;  Laparoscopic Peritoneal Dialysis Catheter Placement - Anesthesia with block;  Surgeon: Esteban Arvizu MD;  [facility]     PICC INSERTION Left 04/22/2018    5Fr - 49cm (3cm external), Basilic vein, low SVC     REMOVE CATHETER PERITONEAL Right 1/15/2018    Procedure: REMOVE CATHETER PERITONEAL;  Open Removal of Peritoneal Dialysis Catheter ;  Surgeon: Esteban Arvizu MD;  [facility]     SIGMOIDOSCOPY FLEXIBLE N/A 11/21/2018    Procedure: Examination Under Anesthesia, Flexible Sigmoidoscopy and Polypectomy;  Surgeon: Rick Tran MD;  [facility]     SIGMOIDOSCOPY FLEXIBLE N/A 12/11/2018    Procedure: Flexible Sigmoidoscopy;  Surgeon: Rick Tran MD;  [facility]     TAKEDOWN ILEOSTOMY N/A 3/27/2019    Procedure: Takedown Ileostomy;  Surgeon: Rick Tran MD;  [facility]     TRANSPLANT HEART RECIPIENT N/A 6/14/2018    Procedure: TRANSPLANT HEART RECIPIENT;  Median Sternotomy, on-pump oxygenator, Heart Transplant;  Surgeon: Rony Caputo MD;  [facility]     TRANSPLANT KIDNEY RECIPIENT LIVING UNRELATED  12/2019       Medications:      Current Outpatient Medications:      amLODIPine (NORVASC) 2.5 MG tablet, Take 2 tablets (5 mg) by mouth daily, Disp: 180 tablet, Rfl: 3     aspirin 81 MG EC tablet, Take 81 mg by mouth daily, Disp: , Rfl:      atorvastatin (LIPITOR) 40 MG tablet, TAKE 1 TABLET(40 MG) BY MOUTH DAILY, Disp: 90 tablet, Rfl:  3     biotin 1000 MCG TABS tablet, Take 1,000 mcg by mouth daily Patient takes every other day, Disp: , Rfl:      blood glucose (ACCU-CHEK GUIDE) test strip, Use to test blood sugar 3 times daily or as directed., Disp: 300 strip, Rfl: 3     blood glucose monitoring (ACCU-CHEK FASTCLIX) lancets, USE TO TEST 3 TIMES DAILY OR AS DIRECTED., Disp: 300 each, Rfl: 3     calcium citrate and vitamin D (CITRACAL) 200-250 MG-UNIT TABS per tablet, Take 1 tablet by mouth 2 times daily, Disp: , Rfl:      famotidine (PEPCID) 20 MG tablet, Take 1 tablet (20 mg) by mouth 2 times daily, Disp: 180 tablet, Rfl: 3     fluticasone (FLONASE) 50 MCG/ACT nasal spray, SHAKE LIQUID AND USE 1 SPRAY IN EACH NOSTRIL DAILY, Disp: 16 g, Rfl: 11     loratadine (CLARITIN) 10 MG tablet, Take 10 mg by mouth daily Patient takes at bedtime, Disp: , Rfl:      metFORMIN (GLUCOPHAGE-XR) 500 MG 24 hr tablet, Take 4 tablets (2,000 mg) by mouth daily (with breakfast), Disp: 360 tablet, Rfl: 3     multivitamin (CENTRUM SILVER) tablet, Take 1 tablet by mouth daily, Disp: , Rfl:      mycophenolate (GENERIC EQUIVALENT) 250 MG capsule, Take 3 capsules (750 mg) by mouth 2 times daily, Disp: 180 capsule, Rfl: 11     sulfamethoxazole-trimethoprim (BACTRIM) 400-80 MG tablet, Take 1 tablet by mouth daily, Disp: 30 tablet, Rfl: 11     tacrolimus (GENERIC EQUIVALENT) 0.5 MG capsule, Take ONE cap every PM (0.5 mg every evening), Disp: 90 capsule, Rfl: 3     tacrolimus (GENERIC EQUIVALENT) 1 MG capsule, TAKE 1 CAPSULE BY MOUTH EVERY MORNING, Disp: 90 capsule, Rfl: 3     Zinc Sulfate (ZINC 15 PO), , Disp: , Rfl:   No current facility-administered medications for this visit.    Facility-Administered Medications Ordered in Other Visits:      diatrizoate meglumine-sodium (GASTROGRAFIN/GASTROVIEW) 66-10 % solution 480 mL, 480 mL, Rectal, Once, Christopher Baker MD    Allergies:    Norco [hydrocodone-acetaminophen], Cats, Isosorbide, Penicillins, Seasonal allergies, and  Shrimp    Physical Examination:    The patient is a well developed, well nourished male in no apparent distress.  He is normocepahlic, atraumatic with pupils equally round and reactive to light.    Oral Cavity Examination: Normal Mucosa with no masses or lesions  Nasal Examination: Normal Mucosa with no masses or lesions  Ear Examination: Ear canals clear, tympanic membranes and middle ear spaces normal  Neurological Examination: Facial nerve function intact and symmetric  Integumentary Examination: No lesions on the skin of the head or neck    Assessment and Plan:    The patient presents with a history of asymmetric sensorineural hearing loss in the left ear. He will be referred for an MRI scan of the head to assess for retrocochlear pathology and he will be referred an Audiology consultation for possible CROS hearing amplification. He will be seen again after his MRI scan.     CC: Dr. Tristan Bañuelos   CC: Emilia Knutson      Again, thank you for allowing me to participate in the care of your patient.      Sincerely,    Noel Godwin MD

## 2021-10-28 NOTE — PROGRESS NOTES
AUDIOLOGY REPORT    SUMMARY: Audiology visit completed. See audiogram for results.      RECOMMENDATIONS: Follow-up with ENT.      Hiram Grubbs, CCC-A  Licensed Audiologist  [place] #[id number]

## 2021-10-28 NOTE — PROGRESS NOTES
The patient presents with a history of severe sensorineural hearing loss in the right ear that occurred suddenly more than ten years ago. He had difficulty with dizziness at the time of the event, but he denies dizziness issues since that time. The patient denies sinusitis, rhinitis, facial pain, nasal obstruction or purulent nasal discharge. The patient denies chronic or recurrent tonsillitis, chronic or recurrent pharyngitis. The patient denies otalgia, otorrhea, eustachian tube dysfunction or ear infections.     His Audiogram and Tympanogram demonstrates a profound sensorineural hearing loss in the left ear with 0% word recognition. His right ear hearing is within the normal range with 100% word recognition. His tympanograms are normal bilaterally.       This patient is seen in consultation at the request of Dr. Tristan Bañuelos.    All other systems were reviewed and they are either negative or they are not directly pertinent to this Otolaryngology examination.      Past Medical History:    Past Medical History:   Diagnosis Date     (HFpEF) heart failure with preserved ejection fraction (H)      Allergic rhinitis, cause unspecified      Anemia of chronic kidney failure      Aortic stenosis 8/13/2008    Overview:  Bicuspid aortic valve     AS (aortic stenosis)      Ascending aortic aneurysm (H)      Bicuspid aortic valve      Bilateral hand pain 6/1/2021     CAD (coronary artery disease)      Congestive heart failure, unspecified      Coronary artery disease involving native artery of transplanted heart without angina pectoris 7/10/2014     Dialysis patient (H)     Tues-Thur-Sat     Dyslipidemia      Esophageal reflux      ESRD (end stage renal disease) (H)      Hearing problem      Heart replaced by transplant (H) 12/10/2018     Hypersomnia with sleep apnea, unspecified      Hypertension      Ileostomy status (H)      Immunosuppression (H)      MGUS (monoclonal gammopathy of unknown significance)      Mitral  regurgitation      SHEELA (obstructive sleep apnea)     No CPAP     Pneumonia      Sigmoid diverticulitis 8/14/2018     Status post coronary angiogram 6/28/2019     Systolic heart failure (H)      Type 2 diabetes mellitus (H)        Past Surgical History:    Past Surgical History:   Procedure Laterality Date     CARDIAC SURGERY       COLONOSCOPY N/A 5/3/2018    Procedure: COLONOSCOPY;  colonoscopy ;  Surgeon: Ammon Castillo MD;  [facility]     CREATE FISTULA ARTERIOVENOUS UPPER EXTREMITY BOVINE Left 5/8/2019    Procedure: Left Upper Extremity Arteriovenous Bovine Graft Creation;  Surgeon: Calin Cheney MD;  [facility]     CV CORONARY ANGIOGRAM N/A 6/28/2019    Procedure: CV CORONARY ANGIOGRAM;  Surgeon: Montrell Posada MD;  [facility]     CV CORONARY ANGIOGRAM N/A 7/15/2020    Procedure: CV CORONARY ANGIOGRAM;  Surgeon: Marcelo Ramírez MD;  [facility]     CV CORONARY ANGIOGRAM N/A 6/7/2021    Procedure: CV CORONARY ANGIOGRAM;  Surgeon: Gilberto Mccann MD;  [facility]     CV HEART BIOPSY N/A 2/1/2019    Procedure: HBX;  Surgeon: Montrell Posada MD;  [facility]     CV HEART BIOPSY N/A 3/22/2019    Procedure: HBX, RIJV ACCESS;  Surgeon: Jordan Fox MD;  [facility]     CV HEART BIOPSY N/A 6/28/2019    Procedure: CV HEART BIOPSY;  Surgeon: Montrell Posada MD;  [facility]     CV HEART BIOPSY N/A 10/28/2019    Procedure: CV HEART BIOPSY;  Surgeon: Marcelo Ramírez MD;  [facility]     CV HEART BIOPSY N/A 2/6/2020    Procedure: CV HEART BIOPSY;  Surgeon: Montrell Posada MD;  [facility]     CV HEART BIOPSY N/A 7/15/2020    Procedure: CV HEART BIOPSY;  Surgeon: Marcelo Ramírez MD;  [facility]     CV RIGHT HEART CATH MEASUREMENTS RECORDED N/A  6/28/2019    Procedure: CV RIGHT HEART CATH;  Surgeon: Montrell Posada MD;  Location:  [facility]     CV RIGHT HEART CATH MEASUREMENTS RECORDED N/A 7/15/2020    Procedure: CV RIGHT HEART CATH;  Surgeon: Marcelo Ramírez MD;  Location:  [facility]     CV RIGHT HEART CATH MEASUREMENTS RECORDED N/A 6/7/2021    Procedure: CV RIGHT HEART CATH;  Surgeon: Gilberto Mccann MD;  Location:  [facility]ARD[facility] CATH [facility]     ESOPHAGOSCOPY, GASTROSCOPY, DUODENOSCOPY (EGD), COMBINED N/A 5/7/2018    Procedure: COMBINED ENDOSCOPIC ULTRASOUND, ESOPHAGOSCOPY, GASTROSCOPY, DUODENOSCOPY (EGD), FINE NEEDLE ASPIRATE/BIOPSY;  Endoscopic Ultrasound with Fine Needle Aspiration ;  Surgeon: Alon Don MD;  [facility]     EXAM UNDER ANESTHESIA RECTUM N/A 8/12/2018    Procedure: EXAM UNDER ANESTHESIA RECTUM;  EXAM UNDER ANESTHESIA RECTUM ,COMBINED INCISION AND DRAINAGE OF RECTAL ABCESS ;  Surgeon: Rick Tran MD;  [facility]     INCISION AND DRAINAGE RECTUM, COMBINED N/A 8/12/2018    Procedure: COMBINED INCISION AND DRAINAGE RECTUM;;  Surgeon: Rick Tran MD;  [facility]     IR DIALYSIS FISTULOGRAM LEFT  9/25/2019     IR DIALYSIS FISTULOGRAM LEFT  11/22/2019     IR DIALYSIS PTA  9/25/2019     IR DIALYSIS PTA  11/22/2019     LAPAROSCOPIC ASSISTED COLOSTOMY TAKEDOWN N/A 12/11/2018    Procedure: Laparoscopic Assisted Colostomy Takedown, Laparoscopic Lysis of Adhesions;  Surgeon: Rick Tran MD;  [facility]     LAPAROSCOPIC ASSISTED SIGMOID COLECTOMY N/A 8/14/2018    Procedure: LAPAROSCOPIC ASSISTED SIGMOID COLECTOMY;  Laparoscopic Hand Assisted Takedown of Splenic Flexure, Sigmoidectomy, Small Bowel Resection, Takedown of Small Bowel to Colon Fistula;  Surgeon: Rick Tran MD;  [facility]     LAPAROSCOPIC HERNIORRHAPHY INGUINAL BILATERAL Bilateral 7/24/2015    Procedure: LAPAROSCOPIC HERNIORRHAPHY INGUINAL BILATERAL;   Surgeon: Bobby Mcconnell MD;  [facility]     LAPAROSCOPIC INSERTION CATHETER PERITONEAL DIALYSIS N/A 6/22/2017    Procedure: LAPAROSCOPIC INSERTION CATHETER PERITONEAL DIALYSIS;  Laparoscopic Peritoneal Dialysis Catheter Placement - Anesthesia with block;  Surgeon: Esteban Arvizu MD;  [facility]     PICC INSERTION Left 04/22/2018    5Fr - 49cm (3cm external), Basilic vein, low SVC     REMOVE CATHETER PERITONEAL Right 1/15/2018    Procedure: REMOVE CATHETER PERITONEAL;  Open Removal of Peritoneal Dialysis Catheter ;  Surgeon: Esteban Arvizu MD;  [facility]     SIGMOIDOSCOPY FLEXIBLE N/A 11/21/2018    Procedure: Examination Under Anesthesia, Flexible Sigmoidoscopy and Polypectomy;  Surgeon: Rick Tran MD;  [facility]     SIGMOIDOSCOPY FLEXIBLE N/A 12/11/2018    Procedure: Flexible Sigmoidoscopy;  Surgeon: Rick Tran MD;  [facility]     TAKEDOWN ILEOSTOMY N/A 3/27/2019    Procedure: Takedown Ileostomy;  Surgeon: Rick Tran MD;  [facility]     TRANSPLANT HEART RECIPIENT N/A 6/14/2018    Procedure: TRANSPLANT HEART RECIPIENT;  Median Sternotomy, on-pump oxygenator, Heart Transplant;  Surgeon: Rony Caputo MD;  [facility]     TRANSPLANT KIDNEY RECIPIENT LIVING UNRELATED  12/2019       Medications:      Current Outpatient Medications:      amLODIPine (NORVASC) 2.5 MG tablet, Take 2 tablets (5 mg) by mouth daily, Disp: 180 tablet, Rfl: 3     aspirin 81 MG EC tablet, Take 81 mg by mouth daily, Disp: , Rfl:      atorvastatin (LIPITOR) 40 MG tablet, TAKE 1 TABLET(40 MG) BY MOUTH DAILY, Disp: 90 tablet, Rfl: 3     biotin 1000 MCG TABS tablet, Take 1,000 mcg by mouth daily Patient takes every other day, Disp: , Rfl:      blood glucose (ACCU-CHEK GUIDE) test strip, Use to test blood sugar 3 times daily or as directed., Disp: 300 strip, Rfl: 3     blood glucose monitoring (ACCU-CHEK FASTCLIX) lancets, USE TO TEST 3 TIMES DAILY OR AS  DIRECTED., Disp: 300 each, Rfl: 3     calcium citrate and vitamin D (CITRACAL) 200-250 MG-UNIT TABS per tablet, Take 1 tablet by mouth 2 times daily, Disp: , Rfl:      famotidine (PEPCID) 20 MG tablet, Take 1 tablet (20 mg) by mouth 2 times daily, Disp: 180 tablet, Rfl: 3     fluticasone (FLONASE) 50 MCG/ACT nasal spray, SHAKE LIQUID AND USE 1 SPRAY IN EACH NOSTRIL DAILY, Disp: 16 g, Rfl: 11     loratadine (CLARITIN) 10 MG tablet, Take 10 mg by mouth daily Patient takes at bedtime, Disp: , Rfl:      metFORMIN (GLUCOPHAGE-XR) 500 MG 24 hr tablet, Take 4 tablets (2,000 mg) by mouth daily (with breakfast), Disp: 360 tablet, Rfl: 3     multivitamin (CENTRUM SILVER) tablet, Take 1 tablet by mouth daily, Disp: , Rfl:      mycophenolate (GENERIC EQUIVALENT) 250 MG capsule, Take 3 capsules (750 mg) by mouth 2 times daily, Disp: 180 capsule, Rfl: 11     sulfamethoxazole-trimethoprim (BACTRIM) 400-80 MG tablet, Take 1 tablet by mouth daily, Disp: 30 tablet, Rfl: 11     tacrolimus (GENERIC EQUIVALENT) 0.5 MG capsule, Take ONE cap every PM (0.5 mg every evening), Disp: 90 capsule, Rfl: 3     tacrolimus (GENERIC EQUIVALENT) 1 MG capsule, TAKE 1 CAPSULE BY MOUTH EVERY MORNING, Disp: 90 capsule, Rfl: 3     Zinc Sulfate (ZINC 15 PO), , Disp: , Rfl:   No current facility-administered medications for this visit.    Facility-Administered Medications Ordered in Other Visits:      diatrizoate meglumine-sodium (GASTROGRAFIN/GASTROVIEW) 66-10 % solution 480 mL, 480 mL, Rectal, Once, Christopher Baker MD    Allergies:    Norco [hydrocodone-acetaminophen], Cats, Isosorbide, Penicillins, Seasonal allergies, and Shrimp    Physical Examination:    The patient is a well developed, well nourished male in no apparent distress.  He is normocepahlic, atraumatic with pupils equally round and reactive to light.    Oral Cavity Examination: Normal Mucosa with no masses or lesions  Nasal Examination: Normal Mucosa with no masses or lesions  Ear  Examination: Ear canals clear, tympanic membranes and middle ear spaces normal  Neurological Examination: Facial nerve function intact and symmetric  Integumentary Examination: No lesions on the skin of the head or neck    Assessment and Plan:    The patient presents with a history of asymmetric sensorineural hearing loss in the left ear. He will be referred for an MRI scan of the head to assess for retrocochlear pathology and he will be referred an Audiology consultation for possible CROS hearing amplification. He will be seen again after his MRI scan.     CC: Dr. Tristan Bañuelos   CC: Emilia Knutson

## 2021-11-01 ENCOUNTER — OFFICE VISIT (OUTPATIENT)
Dept: FAMILY MEDICINE | Facility: CLINIC | Age: 66
End: 2021-11-01
Payer: MEDICARE

## 2021-11-01 DIAGNOSIS — D84.9 IMMUNOSUPPRESSION (H): ICD-10-CM

## 2021-11-01 DIAGNOSIS — K86.2 PANCREATIC CYST: ICD-10-CM

## 2021-11-01 DIAGNOSIS — Z01.818 PREOP GENERAL PHYSICAL EXAM: Primary | ICD-10-CM

## 2021-11-01 DIAGNOSIS — Z94.1 HEART REPLACED BY TRANSPLANT (H): ICD-10-CM

## 2021-11-01 PROCEDURE — 99214 OFFICE O/P EST MOD 30 MIN: CPT | Performed by: PHYSICIAN ASSISTANT

## 2021-11-01 ASSESSMENT — MIFFLIN-ST. JEOR: SCORE: 1564.18

## 2021-11-01 NOTE — H&P (VIEW-ONLY)
[facility]  [address]  [facility] [address]  Phone: [phone]  Primary Provider: Emilia Knutson  Pre-op Performing Provider: CULLEN HANSEN      PREOPERATIVE EVALUATION:  Today's date: 11/1/2021    Murray Nicholson is a 66 year old male who presents for a preoperative evaluation.    Surgical Information:  Surgery/Procedure: ENDOSCOPIC ULTRASOUND, ESOPHAGOSCOPY / UPPER GASTROINTESTINAL TRACT (GI)  Surgery Location: [facility]   Surgeon: Alon Don MD  Surgery Date: 11/08/2021  Time of Surgery: TBD   Where patient plans to recover: Other: Best friend   Fax number for surgical facility: Note does not need to be faxed, will be available electronically in Epic.    Type of Anesthesia Anticipated: Monitor Anesthesia Care    Assessment & Plan     The proposed surgical procedure is considered LOW risk.    Preop general physical exam      Heart replaced by transplant (H)  Stable, follows with cardio with recent exam    Immunosuppression (H)  As above    Pancreatic cyst             Risks and Recommendations:  The patient has the following additional risks and recommendations for perioperative complications:   - Consult Hospitalist / IM to assist with post-op medical management  Diabetes:  - Patient is not on insulin therapy: regular NPO guidelines can be followed.     Medication Instructions:  Patient is to take all scheduled medications on the day of surgery EXCEPT for modifications listed below:   - metformin: HOLD day of surgery.    RECOMMENDATION:  APPROVAL GIVEN to proceed with proposed procedure, without further diagnostic evaluation.                      Subjective     HPI related to upcoming procedure: 67 y/o new to me male here for pre op exam.  He is having upper GI procedure done to evaluate a pancrease cyst.      He has a complicated medical history with heart transplant and kindey transplant in the past.   More recently he has been doing quite well.  Had follow up with cardio in the last month, and everything is stable.  No cardiac symptoms.      Follows with endocrine for well controlled diabetes, last A1c 6.2    Preop Questions 11/1/2021   1. Have you ever had a heart attack or stroke? No   2. Have you ever had surgery on your heart or blood vessels, such as a stent placement, a coronary artery bypass, or surgery on an artery in your head, neck, heart, or legs? No   3. Do you have chest pain with activity? No   4. Do you have a history of  heart failure? YES - s/p heart transplant   5. Do you currently have a cold, bronchitis or symptoms of other infection? No   6. Do you have a cough, shortness of breath, or wheezing? No   7. Do you or anyone in your family have previous history of blood clots? UNKNOWN -    8. Do you or does anyone in your family have a serious bleeding problem such as prolonged bleeding following surgeries or cuts? No   9. Have you ever had problems with anemia or been told to take iron pills? No   10. Have you had any abnormal blood loss such as black, tarry or bloody stools? No   11. Have you ever had a blood transfusion? UNKNOWN -    12. Are you willing to have a blood transfusion if it is medically needed before, during, or after your surgery? Yes   13. Have you or any of your relatives ever had problems with anesthesia? No   14. Do you have sleep apnea, excessive snoring or daytime drowsiness? No   15. Do you have any artifical heart valves or other implanted medical devices like a pacemaker, defibrillator, or continuous glucose monitor? No   16. Do you have artificial joints? No   17. Are you allergic to latex? No     Health Care Directive:  Patient has a Health Care Directive on file      Preoperative Review of :   reviewed - no record of controlled substances prescribed.      Status of Chronic Conditions:  DIABETES - Patient has a longstanding history of DiabetesType Type II .  Patient is being treated with oral agents and denies significant side effects. Control has been good. Complicating factors include but are not limited to: hypertension.     HYPERTENSION - Patient has longstanding history of HTN , currently denies any symptoms referable to elevated blood pressure. Specifically denies chest pain, palpitations, dyspnea, orthopnea, PND or peripheral edema. Blood pressure readings have been in normal range. Current medication regimen is as listed below. Patient denies any side effects of medication.       Review of Systems  CONSTITUTIONAL: NEGATIVE for fever, chills, change in weight  INTEGUMENTARY/SKIN: NEGATIVE for worrisome rashes, moles or lesions  EYES: NEGATIVE for vision changes or irritation  ENT/MOUTH: NEGATIVE for ear, mouth and throat problems  RESP: NEGATIVE for significant cough or SOB  CV: NEGATIVE for chest pain, palpitations or peripheral edema  GI: NEGATIVE for nausea, abdominal pain, heartburn, or change in bowel habits  : NEGATIVE for frequency, dysuria, or hematuria  MUSCULOSKELETAL: NEGATIVE for significant arthralgias or myalgia  NEURO: NEGATIVE for weakness, dizziness or paresthesias  ENDOCRINE: NEGATIVE for temperature intolerance, skin/hair changes  HEME: NEGATIVE for bleeding problems  PSYCHIATRIC: NEGATIVE for changes in mood or affect    Patient Active Problem List    Diagnosis Date Noted     Decreased hearing, unspecified laterality 08/23/2021     Priority: Medium     Erectile dysfunction, unspecified erectile dysfunction type 08/22/2021     Priority: Medium     Nocturia 08/22/2021     Priority: Medium     History of hernia repair 08/22/2021     Priority: Medium     Need for pneumocystis prophylaxis 06/28/2020     Priority: Medium     Aftercare following organ transplant 12/24/2019     Priority: Medium     HTN, kidney transplant related 12/24/2019     Priority: Medium     Secondary renal hyperparathyroidism (H) 12/24/2019     Priority: Medium     Vitamin D  deficiency 12/24/2019     Priority: Medium     Hypomagnesemia 12/24/2019     Priority: Medium     SHEELA on CPAP 12/24/2019     Priority: Medium     Kidney replaced by transplant 12/19/2019     Priority: Medium     Pancreatic cyst 11/13/2019     Priority: Medium     Immunosuppression (H)      Priority: Medium     Type 2 diabetes mellitus (H)      Priority: Medium     Heart replaced by transplant (H) 12/10/2018     Priority: Medium     Added automatically from request for surgery [id number]       Dyslipidemia      Priority: Medium     MGUS (monoclonal gammopathy of unknown significance)      Priority: Medium     Ascending aortic aneurysm (H)      Priority: Medium     Anemia in chronic renal disease 04/12/2013     Priority: Medium     Problem list name updated by automated process. Provider to review and confirm       Allergic rhinitis 04/05/2006     Priority: Medium     Problem list name updated by automated process. Provider to review       Esophageal reflux 08/12/2004     Priority: Medium      Past Medical History:   Diagnosis Date     (HFpEF) heart failure with preserved ejection fraction (H)      Allergic rhinitis, cause unspecified      Anemia of chronic kidney failure      Aortic stenosis 8/13/2008    Overview:  Bicuspid aortic valve     AS (aortic stenosis)      Ascending aortic aneurysm (H)      Bicuspid aortic valve      Bilateral hand pain 6/1/2021     CAD (coronary artery disease)      Congestive heart failure, unspecified      Coronary artery disease involving native artery of transplanted heart without angina pectoris 7/10/2014     Dialysis patient (H)     Tues-Thur-Sat     Dyslipidemia      Esophageal reflux      ESRD (end stage renal disease) (H)      Hearing problem      Heart replaced by transplant (H) 12/10/2018     Hypersomnia with sleep apnea, unspecified      Hypertension      Ileostomy status (H)      Immunosuppression (H)      MGUS (monoclonal gammopathy of unknown significance)      Mitral  regurgitation      SHEELA (obstructive sleep apnea)     No CPAP     Pneumonia      Sigmoid diverticulitis 8/14/2018     Status post coronary angiogram 6/28/2019     Systolic heart failure (H)      Type 2 diabetes mellitus (H)      Past Surgical History:   Procedure Laterality Date     BIOPSY       CARDIAC SURGERY       COLONOSCOPY N/A 5/3/2018    Procedure: COLONOSCOPY;  colonoscopy ;  Surgeon: Ammon Castillo MD;  [facility]     CREATE FISTULA ARTERIOVENOUS UPPER EXTREMITY BOVINE Left 5/8/2019    Procedure: Left Upper Extremity Arteriovenous Bovine Graft Creation;  Surgeon: Calin Cheney MD;  [facility]     CV CORONARY ANGIOGRAM N/A 6/28/2019    Procedure: CV CORONARY ANGIOGRAM;  Surgeon: Montrell Posada MD;  [facility]     CV CORONARY ANGIOGRAM N/A 7/15/2020    Procedure: CV CORONARY ANGIOGRAM;  Surgeon: Marcelo Ramírez MD;  [facility]     CV CORONARY ANGIOGRAM N/A 6/7/2021    Procedure: CV CORONARY ANGIOGRAM;  Surgeon: Gilberto Mccann MD;  [facility]     CV HEART BIOPSY N/A 2/1/2019    Procedure: HBX;  Surgeon: Montrell Posada MD;  [facility]     CV HEART BIOPSY N/A 3/22/2019    Procedure: HBX, RIJV ACCESS;  Surgeon: Jordan Fox MD;  [facility]     CV HEART BIOPSY N/A 6/28/2019    Procedure: CV HEART BIOPSY;  Surgeon: Montrell Posada MD;  [facility]     CV HEART BIOPSY N/A 10/28/2019    Procedure: CV HEART BIOPSY;  Surgeon: Marcelo Ramírez MD;  [facility]     CV HEART BIOPSY N/A 2/6/2020    Procedure: CV HEART BIOPSY;  Surgeon: Montrell Posada MD;  [facility]     CV HEART BIOPSY N/A 7/15/2020    Procedure: CV HEART BIOPSY;  Surgeon: Marcelo Ramírez MD;  Location:  HEART CARDIAC CATH LAB     CV RIGHT HEART CATH MEASUREMENTS RECORDED N/A 6/28/2019     Procedure: CV RIGHT HEART CATH;  Surgeon: Montrell Posada MD;  Location:  HEART CARDIAC CATH LAB     CV RIGHT HEART CATH MEASUREMENTS RECORDED N/A 7/15/2020    Procedure: CV RIGHT HEART CATH;  Surgeon: Marcelo Ramírez MD;  Location:  HEART CARDIAC CATH LAB     CV RIGHT HEART CATH MEASUREMENTS RECORDED N/A 6/7/2021    Procedure: CV RIGHT HEART CATH;  Surgeon: Gilberto Mccann MD;  Location:  HEART CARDIAC CATH LAB     ESOPHAGOSCOPY, GASTROSCOPY, DUODENOSCOPY (EGD), COMBINED N/A 5/7/2018    Procedure: COMBINED ENDOSCOPIC ULTRASOUND, ESOPHAGOSCOPY, GASTROSCOPY, DUODENOSCOPY (EGD), FINE NEEDLE ASPIRATE/BIOPSY;  Endoscopic Ultrasound with Fine Needle Aspiration ;  Surgeon: Alon Don MD;  [facility]     EXAM UNDER ANESTHESIA RECTUM N/A 8/12/2018    Procedure: EXAM UNDER ANESTHESIA RECTUM;  EXAM UNDER ANESTHESIA RECTUM ,COMBINED INCISION AND DRAINAGE OF RECTAL ABCESS ;  Surgeon: Rick Tran MD;  [facility]     INCISION AND DRAINAGE RECTUM, COMBINED N/A 8/12/2018    Procedure: COMBINED INCISION AND DRAINAGE RECTUM;;  Surgeon: Rick Tran MD;  [facility]     IR DIALYSIS FISTULOGRAM LEFT  9/25/2019     IR DIALYSIS FISTULOGRAM LEFT  11/22/2019     IR DIALYSIS PTA  9/25/2019     IR DIALYSIS PTA  11/22/2019     LAPAROSCOPIC ASSISTED COLOSTOMY TAKEDOWN N/A 12/11/2018    Procedure: Laparoscopic Assisted Colostomy Takedown, Laparoscopic Lysis of Adhesions;  Surgeon: Rick Tran MD;  [facility]     LAPAROSCOPIC ASSISTED SIGMOID COLECTOMY N/A 8/14/2018    Procedure: LAPAROSCOPIC ASSISTED SIGMOID COLECTOMY;  Laparoscopic Hand Assisted Takedown of Splenic Flexure, Sigmoidectomy, Small Bowel Resection, Takedown of Small Bowel to Colon Fistula;  Surgeon: Rick Tran MD;  [facility]     LAPAROSCOPIC HERNIORRHAPHY INGUINAL BILATERAL Bilateral 7/24/2015    Procedure: LAPAROSCOPIC HERNIORRHAPHY INGUINAL BILATERAL;  Surgeon:  Bobby Mcconnell MD;  [facility]     LAPAROSCOPIC INSERTION CATHETER PERITONEAL DIALYSIS N/A 6/22/2017    Procedure: LAPAROSCOPIC INSERTION CATHETER PERITONEAL DIALYSIS;  Laparoscopic Peritoneal Dialysis Catheter Placement - Anesthesia with block;  Surgeon: Esteban Arvizu MD;  [facility]     PICC INSERTION Left 04/22/2018    5Fr - 49cm (3cm external), Basilic vein, low SVC     REMOVE CATHETER PERITONEAL Right 1/15/2018    Procedure: REMOVE CATHETER PERITONEAL;  Open Removal of Peritoneal Dialysis Catheter ;  Surgeon: Esteban Arvizu MD;  [facility]     SIGMOIDOSCOPY FLEXIBLE N/A 11/21/2018    Procedure: Examination Under Anesthesia, Flexible Sigmoidoscopy and Polypectomy;  Surgeon: Rick Tran MD;  [facility]     SIGMOIDOSCOPY FLEXIBLE N/A 12/11/2018    Procedure: Flexible Sigmoidoscopy;  Surgeon: Rick Tran MD;  [facility]     TAKEDOWN ILEOSTOMY N/A 3/27/2019    Procedure: Takedown Ileostomy;  Surgeon: Rick Tran MD;  [facility]     TRANSPLANT HEART RECIPIENT N/A 6/14/2018    Procedure: TRANSPLANT HEART RECIPIENT;  Median Sternotomy, on-pump oxygenator, Heart Transplant;  Surgeon: Rony Caputo MD;  [facility]     TRANSPLANT KIDNEY RECIPIENT LIVING UNRELATED  12/2019     Current Outpatient Medications   Medication Sig Dispense Refill     amLODIPine (NORVASC) 2.5 MG tablet Take 2 tablets (5 mg) by mouth daily 180 tablet 3     aspirin 81 MG EC tablet Take 81 mg by mouth daily       atorvastatin (LIPITOR) 40 MG tablet TAKE 1 TABLET(40 MG) BY MOUTH DAILY 90 tablet 3     biotin 1000 MCG TABS tablet Take 1,000 mcg by mouth daily Patient takes every other day       blood glucose (ACCU-CHEK GUIDE) test strip Use to test blood sugar 3 times daily or as directed. 300 strip 3     blood glucose monitoring (ACCU-CHEK FASTCLIX) lancets USE TO TEST 3 TIMES DAILY OR AS DIRECTED. 300 each 3     calcium citrate and vitamin D (CITRACAL)  "200-250 MG-UNIT TABS per tablet Take 1 tablet by mouth 2 times daily       famotidine (PEPCID) 20 MG tablet Take 1 tablet (20 mg) by mouth 2 times daily 180 tablet 3     fluticasone (FLONASE) 50 MCG/ACT nasal spray SHAKE LIQUID AND USE 1 SPRAY IN EACH NOSTRIL DAILY 16 g 11     loratadine (CLARITIN) 10 MG tablet Take 10 mg by mouth daily Patient takes at bedtime       metFORMIN (GLUCOPHAGE-XR) 500 MG 24 hr tablet Take 4 tablets (2,000 mg) by mouth daily (with breakfast) 360 tablet 3     multivitamin (CENTRUM SILVER) tablet Take 1 tablet by mouth daily       mycophenolate (GENERIC EQUIVALENT) 250 MG capsule Take 3 capsules (750 mg) by mouth 2 times daily 180 capsule 11     sulfamethoxazole-trimethoprim (BACTRIM) 400-80 MG tablet Take 1 tablet by mouth daily 30 tablet 11     tacrolimus (GENERIC EQUIVALENT) 0.5 MG capsule Take ONE cap every PM (0.5 mg every evening) 90 capsule 3     tacrolimus (GENERIC EQUIVALENT) 1 MG capsule TAKE 1 CAPSULE BY MOUTH EVERY MORNING 90 capsule 3     Zinc Sulfate (ZINC 15 PO)          Allergies   Allergen Reactions     Norco [Hydrocodone-Acetaminophen] Nausea and Vomiting     Cats      Throat tightness     Isosorbide Other (See Comments)     hypotension     Penicillins Hives     Seasonal Allergies      rhinitis     Shrimp      Throat closes         Social History     Tobacco Use     Smoking status: Former Smoker     Packs/day: 1.00     Years: 20.00     Pack years: 20.00     Types: Cigarettes     Start date: 1984     Quit date: 1994     Years since quittin.2     Smokeless tobacco: Never Used   Substance Use Topics     Alcohol use: Yes     Comment: Occasionally       History   Drug Use No         Objective     /84   Pulse 88   Temp 97.7  F (36.5  C) (Tympanic)   Resp 16   Ht 1.702 m (5' 7\")   Wt 82.6 kg (182 lb)   SpO2 99%   BMI 28.51 kg/m      Physical Exam  GENERAL APPEARANCE: alert and no distress  HENT: ear canals and TM's normal and nose and mouth without " ulcers or lesions  RESP: lungs clear to auscultation - no rales, rhonchi or wheezes  CV: regular rate and rhythm, normal S1 S2, no S3 or S4 and no murmur, click or rub     Recent Labs   Lab Test 10/12/21  0942 09/07/21  0941 09/07/21  0940 08/18/21  0935 06/07/21  1124 06/07/21  0834 10/12/20  0947 09/14/20  0933 12/19/19  1545 12/10/19  1121 11/22/19  1037 11/22/19  1022   HGB 12.9* 13.3  --    < >   < > 12.9*   < > 13.1*   < > 11.0*   < > 10.0*    206  --    < >   < > 189   < > 188   < > 254   < > 234   INR  --   --   --   --   --   --   --   --   --  1.15*  --  1.17*     --  139  --    < > 139   < > 138   < > 133   < >  --    POTASSIUM 3.7  --  4.1  --    < > 4.2   < > 4.0   < > 4.5   < >  --    CR 1.03  --  1.15  --    < > 1.04   < > 1.03   < > 6.20*   < >  --    A1C  --   --   --   --   --  6.2*  --  6.1*   < > 5.2  --   --     < > = values in this interval not displayed.        Diagnostics:  No labs were ordered during this visit.   EKG and cardiac MRI 10/12/21    Revised Cardiac Risk Index (RCRI):  The patient has the following serious cardiovascular risks for perioperative complications:   - Coronary Artery Disease (MI, positive stress test, angina, Qs on EKG) = 1 point     RCRI Interpretation: 1 point: Class II (low risk - 0.9% complication rate)           Signed Electronically by: Richard Marquez PA-C  Copy of this evaluation report is provided to requesting physician.

## 2021-11-01 NOTE — PATIENT INSTRUCTIONS
Preparing for Your Surgery  Getting started  A nurse will call you to review your health history and instructions. They will give you an arrival time based on your scheduled surgery time.  Please be ready to share the following:    Your doctor's clinic name and phone number    Your medical, surgical and anesthesia history    A list of allergies and sensitivities    A list of medicines, including herbal treatments and over-the-counter drugs    Whether the patient has a legal guardian (ask how to send us the papers in advance)  If you have a child who's having surgery, please ask for a copy of Preparing for Your Child's Surgery.    Preparing for surgery    Within 30 days of surgery: Have a pre-op exam (sometimes called an H&P, or History and Physical). This can be done at a clinic or pre-operative center.  ? If you're having a , you may not need this exam. Talk to your care team    At your pre-op exam, talk to your care team about all medicines you take. If you need to stop any medicines before surgery, ask when to start taking them again.  ? We do this for your safety. Many medicines can make you bleed too much during surgery. Some change how well surgery (anesthesia) drugs work.    Call your insurance company to let them know you're having surgery. (If you don't have insurance, call [phone].)    Call your clinic if there's any change in your health. This includes signs of a cold or flu (sore throat, runny nose, cough, rash, fever). It also includes a scrape or scratch near the surgery site.    If you have questions on the day of surgery, call your hospital or surgery center.  Eating and drinking guidelines  For your safety: Unless your surgeon tells you otherwise, follow the guidelines below.    Eat and drink as usual until 8 hours before surgery. After that, no food or milk.    Drink clear liquids until 2 hours before surgery. These are liquids you can see through, like water, Gatorade and Propel  Water. You may also have black coffee and tea (no cream or milk).    Nothing by mouth within 2 hours of surgery. This includes gum, candy and breath mints.    If you drink, stop drinking alcohol the night before surgery.    If your care team tells you to take medicine on the morning of surgery, it's okay to take it with a sip of water.  Preventing infection    Shower or bathe the night before and morning of your surgery. Follow the instructions your clinic gave you. (If no instructions, use regular soap.)    Don't shave or clip hair near your surgery site. We'll remove the hair if needed.    Don't smoke or vape the morning of surgery. You may chew nicotine gum up to 2 hours before surgery. A nicotine patch is okay.  ? Note: Some surgeries require you to completely quit smoking and nicotine. Check with your surgeon.    Your care team will make every effort to keep you safe from infection. We will:  ? Clean our hands often with soap and water (or an alcohol-based hand rub).  ? Clean the skin at your surgery site with a special soap that kills germs.  ? Give you a special gown to keep you warm. (Cold raises the risk of infection.)  ? Wear special hair covers, masks, gowns and gloves during surgery.  ? Give antibiotic medicine, if prescribed. Not all surgeries need antibiotics.  What to bring on the day of surgery    Photo ID and insurance card    Copy of your health care directive, if you have one    Glasses and hearing aides (bring cases)  ? You can't wear contacts during surgery    Inhaler and eye drops, if you use them (tell us about these when you arrive)    CPAP machine or breathing device, if you use them    A few personal items, if spending the night    If you have . . .  ? A pacemaker or ICD (cardiac defibrillator): Bring the ID card.  ? An implanted stimulator: Bring the remote control.  ? A legal guardian: Bring a copy of the certified (court-stamped) guardianship papers.  Please remove any jewelry, including  body piercings. Leave jewelry and other valuables at home.  If you're going home the day of surgery  Important: If you don't follow the rules below, we must cancel your surgery.     Arrange for someone to drive you home after surgery. You may not drive, take a taxi or take public transportation by yourself (unless you'll have local anesthesia only).    Arrange for a responsible adult to stay with you overnight. If you don't, we may keep you in the hospital overnight, and you may need to pay the costs yourself.  Questions?   If you have any questions for your care team, list them here: _________________________________________________________________________________________________________________________________________________________________________________________________________________________________________________________________________________________________________________________  For informational purposes only. Not to replace the advice of your health care provider. Copyright   2003, 2019 [facility]. All rights reserved. Clinically reviewed by Fadumo Tavera MD. [employer] [id number] - REV 4/20.

## 2021-11-01 NOTE — PROGRESS NOTES
[facility]  [address]  [facility] [address]  Phone: [phone]  Primary Provider: Emilia Knutson  Pre-op Performing Provider: CULLEN HANSEN      PREOPERATIVE EVALUATION:  Today's date: 11/1/2021    Murray Nicholson is a 66 year old male who presents for a preoperative evaluation.    Surgical Information:  Surgery/Procedure: ENDOSCOPIC ULTRASOUND, ESOPHAGOSCOPY / UPPER GASTROINTESTINAL TRACT (GI)  Surgery Location: [facility]   Surgeon: Alon Don MD  Surgery Date: 11/08/2021  Time of Surgery: TBD   Where patient plans to recover: Other: Best friend   Fax number for surgical facility: Note does not need to be faxed, will be available electronically in Epic.    Type of Anesthesia Anticipated: Monitor Anesthesia Care    Assessment & Plan     The proposed surgical procedure is considered LOW risk.    Preop general physical exam      Heart replaced by transplant (H)  Stable, follows with cardio with recent exam    Immunosuppression (H)  As above    Pancreatic cyst             Risks and Recommendations:  The patient has the following additional risks and recommendations for perioperative complications:   - Consult Hospitalist / IM to assist with post-op medical management  Diabetes:  - Patient is not on insulin therapy: regular NPO guidelines can be followed.     Medication Instructions:  Patient is to take all scheduled medications on the day of surgery EXCEPT for modifications listed below:   - metformin: HOLD day of surgery.    RECOMMENDATION:  APPROVAL GIVEN to proceed with proposed procedure, without further diagnostic evaluation.                      Subjective     HPI related to upcoming procedure: 65 y/o new to me male here for pre op exam.  He is having upper GI procedure done to evaluate a pancrease cyst.      He has a complicated medical history with heart transplant and kindey transplant in the past.   More recently he has been doing quite well.  Had follow up with cardio in the last month, and everything is stable.  No cardiac symptoms.      Follows with endocrine for well controlled diabetes, last A1c 6.2    Preop Questions 11/1/2021   1. Have you ever had a heart attack or stroke? No   2. Have you ever had surgery on your heart or blood vessels, such as a stent placement, a coronary artery bypass, or surgery on an artery in your head, neck, heart, or legs? No   3. Do you have chest pain with activity? No   4. Do you have a history of  heart failure? YES - s/p heart transplant   5. Do you currently have a cold, bronchitis or symptoms of other infection? No   6. Do you have a cough, shortness of breath, or wheezing? No   7. Do you or anyone in your family have previous history of blood clots? UNKNOWN -    8. Do you or does anyone in your family have a serious bleeding problem such as prolonged bleeding following surgeries or cuts? No   9. Have you ever had problems with anemia or been told to take iron pills? No   10. Have you had any abnormal blood loss such as black, tarry or bloody stools? No   11. Have you ever had a blood transfusion? UNKNOWN -    12. Are you willing to have a blood transfusion if it is medically needed before, during, or after your surgery? Yes   13. Have you or any of your relatives ever had problems with anesthesia? No   14. Do you have sleep apnea, excessive snoring or daytime drowsiness? No   15. Do you have any artifical heart valves or other implanted medical devices like a pacemaker, defibrillator, or continuous glucose monitor? No   16. Do you have artificial joints? No   17. Are you allergic to latex? No     Health Care Directive:  Patient has a Health Care Directive on file      Preoperative Review of :   reviewed - no record of controlled substances prescribed.      Status of Chronic Conditions:  DIABETES - Patient has a longstanding history of DiabetesType Type II .  Patient is being treated with oral agents and denies significant side effects. Control has been good. Complicating factors include but are not limited to: hypertension.     HYPERTENSION - Patient has longstanding history of HTN , currently denies any symptoms referable to elevated blood pressure. Specifically denies chest pain, palpitations, dyspnea, orthopnea, PND or peripheral edema. Blood pressure readings have been in normal range. Current medication regimen is as listed below. Patient denies any side effects of medication.       Review of Systems  CONSTITUTIONAL: NEGATIVE for fever, chills, change in weight  INTEGUMENTARY/SKIN: NEGATIVE for worrisome rashes, moles or lesions  EYES: NEGATIVE for vision changes or irritation  ENT/MOUTH: NEGATIVE for ear, mouth and throat problems  RESP: NEGATIVE for significant cough or SOB  CV: NEGATIVE for chest pain, palpitations or peripheral edema  GI: NEGATIVE for nausea, abdominal pain, heartburn, or change in bowel habits  : NEGATIVE for frequency, dysuria, or hematuria  MUSCULOSKELETAL: NEGATIVE for significant arthralgias or myalgia  NEURO: NEGATIVE for weakness, dizziness or paresthesias  ENDOCRINE: NEGATIVE for temperature intolerance, skin/hair changes  HEME: NEGATIVE for bleeding problems  PSYCHIATRIC: NEGATIVE for changes in mood or affect    Patient Active Problem List    Diagnosis Date Noted     Decreased hearing, unspecified laterality 08/23/2021     Priority: Medium     Erectile dysfunction, unspecified erectile dysfunction type 08/22/2021     Priority: Medium     Nocturia 08/22/2021     Priority: Medium     History of hernia repair 08/22/2021     Priority: Medium     Need for pneumocystis prophylaxis 06/28/2020     Priority: Medium     Aftercare following organ transplant 12/24/2019     Priority: Medium     HTN, kidney transplant related 12/24/2019     Priority: Medium     Secondary renal hyperparathyroidism (H) 12/24/2019     Priority: Medium     Vitamin D  deficiency 12/24/2019     Priority: Medium     Hypomagnesemia 12/24/2019     Priority: Medium     SHEELA on CPAP 12/24/2019     Priority: Medium     Kidney replaced by transplant 12/19/2019     Priority: Medium     Pancreatic cyst 11/13/2019     Priority: Medium     Immunosuppression (H)      Priority: Medium     Type 2 diabetes mellitus (H)      Priority: Medium     Heart replaced by transplant (H) 12/10/2018     Priority: Medium     Added automatically from request for surgery [id number]       Dyslipidemia      Priority: Medium     MGUS (monoclonal gammopathy of unknown significance)      Priority: Medium     Ascending aortic aneurysm (H)      Priority: Medium     Anemia in chronic renal disease 04/12/2013     Priority: Medium     Problem list name updated by automated process. Provider to review and confirm       Allergic rhinitis 04/05/2006     Priority: Medium     Problem list name updated by automated process. Provider to review       Esophageal reflux 08/12/2004     Priority: Medium      Past Medical History:   Diagnosis Date     (HFpEF) heart failure with preserved ejection fraction (H)      Allergic rhinitis, cause unspecified      Anemia of chronic kidney failure      Aortic stenosis 8/13/2008    Overview:  Bicuspid aortic valve     AS (aortic stenosis)      Ascending aortic aneurysm (H)      Bicuspid aortic valve      Bilateral hand pain 6/1/2021     CAD (coronary artery disease)      Congestive heart failure, unspecified      Coronary artery disease involving native artery of transplanted heart without angina pectoris 7/10/2014     Dialysis patient (H)     Tues-Thur-Sat     Dyslipidemia      Esophageal reflux      ESRD (end stage renal disease) (H)      Hearing problem      Heart replaced by transplant (H) 12/10/2018     Hypersomnia with sleep apnea, unspecified      Hypertension      Ileostomy status (H)      Immunosuppression (H)      MGUS (monoclonal gammopathy of unknown significance)      Mitral  regurgitation      SHEELA (obstructive sleep apnea)     No CPAP     Pneumonia      Sigmoid diverticulitis 8/14/2018     Status post coronary angiogram 6/28/2019     Systolic heart failure (H)      Type 2 diabetes mellitus (H)      Past Surgical History:   Procedure Laterality Date     BIOPSY       CARDIAC SURGERY       COLONOSCOPY N/A 5/3/2018    Procedure: COLONOSCOPY;  colonoscopy ;  Surgeon: Ammon Castillo MD;  [facility]     CREATE FISTULA ARTERIOVENOUS UPPER EXTREMITY BOVINE Left 5/8/2019    Procedure: Left Upper Extremity Arteriovenous Bovine Graft Creation;  Surgeon: Calin Cheney MD;  [facility]     CV CORONARY ANGIOGRAM N/A 6/28/2019    Procedure: CV CORONARY ANGIOGRAM;  Surgeon: Montrell Posada MD;  [facility]     CV CORONARY ANGIOGRAM N/A 7/15/2020    Procedure: CV CORONARY ANGIOGRAM;  Surgeon: Marcelo Ramírez MD;  [facility]     CV CORONARY ANGIOGRAM N/A 6/7/2021    Procedure: CV CORONARY ANGIOGRAM;  Surgeon: Gilberto Mccann MD;  [facility]     CV HEART BIOPSY N/A 2/1/2019    Procedure: HBX;  Surgeon: Montrell Posada MD;  [facility]     CV HEART BIOPSY N/A 3/22/2019    Procedure: HBX, RIJV ACCESS;  Surgeon: Jordan Fox MD;  [facility]     CV HEART BIOPSY N/A 6/28/2019    Procedure: CV HEART BIOPSY;  Surgeon: Montrell Posada MD;  [facility]     CV HEART BIOPSY N/A 10/28/2019    Procedure: CV HEART BIOPSY;  Surgeon: Marcelo Ramírez MD;  [facility]     CV HEART BIOPSY N/A 2/6/2020    Procedure: CV HEART BIOPSY;  Surgeon: Montrell Posada MD;  [facility]     CV HEART BIOPSY N/A 7/15/2020    Procedure: CV HEART BIOPSY;  Surgeon: Marcelo Ramírez MD;  Location:  HEART CARDIAC CATH LAB     CV RIGHT HEART CATH MEASUREMENTS RECORDED N/A 6/28/2019     Procedure: CV RIGHT HEART CATH;  Surgeon: Montrell Posada MD;  Location:  HEART CARDIAC CATH LAB     CV RIGHT HEART CATH MEASUREMENTS RECORDED N/A 7/15/2020    Procedure: CV RIGHT HEART CATH;  Surgeon: Marcelo Ramírez MD;  Location:  HEART CARDIAC CATH LAB     CV RIGHT HEART CATH MEASUREMENTS RECORDED N/A 6/7/2021    Procedure: CV RIGHT HEART CATH;  Surgeon: Gilberto Mccann MD;  Location:  HEART CARDIAC CATH LAB     ESOPHAGOSCOPY, GASTROSCOPY, DUODENOSCOPY (EGD), COMBINED N/A 5/7/2018    Procedure: COMBINED ENDOSCOPIC ULTRASOUND, ESOPHAGOSCOPY, GASTROSCOPY, DUODENOSCOPY (EGD), FINE NEEDLE ASPIRATE/BIOPSY;  Endoscopic Ultrasound with Fine Needle Aspiration ;  Surgeon: Alon Don MD;  [facility]     EXAM UNDER ANESTHESIA RECTUM N/A 8/12/2018    Procedure: EXAM UNDER ANESTHESIA RECTUM;  EXAM UNDER ANESTHESIA RECTUM ,COMBINED INCISION AND DRAINAGE OF RECTAL ABCESS ;  Surgeon: Rick Tran MD;  [facility]     INCISION AND DRAINAGE RECTUM, COMBINED N/A 8/12/2018    Procedure: COMBINED INCISION AND DRAINAGE RECTUM;;  Surgeon: Rick Tran MD;  [facility]     IR DIALYSIS FISTULOGRAM LEFT  9/25/2019     IR DIALYSIS FISTULOGRAM LEFT  11/22/2019     IR DIALYSIS PTA  9/25/2019     IR DIALYSIS PTA  11/22/2019     LAPAROSCOPIC ASSISTED COLOSTOMY TAKEDOWN N/A 12/11/2018    Procedure: Laparoscopic Assisted Colostomy Takedown, Laparoscopic Lysis of Adhesions;  Surgeon: Rick Tran MD;  [facility]     LAPAROSCOPIC ASSISTED SIGMOID COLECTOMY N/A 8/14/2018    Procedure: LAPAROSCOPIC ASSISTED SIGMOID COLECTOMY;  Laparoscopic Hand Assisted Takedown of Splenic Flexure, Sigmoidectomy, Small Bowel Resection, Takedown of Small Bowel to Colon Fistula;  Surgeon: Rick Tran MD;  [facility]     LAPAROSCOPIC HERNIORRHAPHY INGUINAL BILATERAL Bilateral 7/24/2015    Procedure: LAPAROSCOPIC HERNIORRHAPHY INGUINAL BILATERAL;  Surgeon:  Bobby Mcconnell MD;  [facility]     LAPAROSCOPIC INSERTION CATHETER PERITONEAL DIALYSIS N/A 6/22/2017    Procedure: LAPAROSCOPIC INSERTION CATHETER PERITONEAL DIALYSIS;  Laparoscopic Peritoneal Dialysis Catheter Placement - Anesthesia with block;  Surgeon: Esteban Arvizu MD;  [facility]     PICC INSERTION Left 04/22/2018    5Fr - 49cm (3cm external), Basilic vein, low SVC     REMOVE CATHETER PERITONEAL Right 1/15/2018    Procedure: REMOVE CATHETER PERITONEAL;  Open Removal of Peritoneal Dialysis Catheter ;  Surgeon: Esteban Arvizu MD;  [facility]     SIGMOIDOSCOPY FLEXIBLE N/A 11/21/2018    Procedure: Examination Under Anesthesia, Flexible Sigmoidoscopy and Polypectomy;  Surgeon: Rick Tran MD;  [facility]     SIGMOIDOSCOPY FLEXIBLE N/A 12/11/2018    Procedure: Flexible Sigmoidoscopy;  Surgeon: Rick Tran MD;  [facility]     TAKEDOWN ILEOSTOMY N/A 3/27/2019    Procedure: Takedown Ileostomy;  Surgeon: Rick Tran MD;  [facility]     TRANSPLANT HEART RECIPIENT N/A 6/14/2018    Procedure: TRANSPLANT HEART RECIPIENT;  Median Sternotomy, on-pump oxygenator, Heart Transplant;  Surgeon: Rony Caputo MD;  [facility]     TRANSPLANT KIDNEY RECIPIENT LIVING UNRELATED  12/2019     Current Outpatient Medications   Medication Sig Dispense Refill     amLODIPine (NORVASC) 2.5 MG tablet Take 2 tablets (5 mg) by mouth daily 180 tablet 3     aspirin 81 MG EC tablet Take 81 mg by mouth daily       atorvastatin (LIPITOR) 40 MG tablet TAKE 1 TABLET(40 MG) BY MOUTH DAILY 90 tablet 3     biotin 1000 MCG TABS tablet Take 1,000 mcg by mouth daily Patient takes every other day       blood glucose (ACCU-CHEK GUIDE) test strip Use to test blood sugar 3 times daily or as directed. 300 strip 3     blood glucose monitoring (ACCU-CHEK FASTCLIX) lancets USE TO TEST 3 TIMES DAILY OR AS DIRECTED. 300 each 3     calcium citrate and vitamin D (CITRACAL)  "200-250 MG-UNIT TABS per tablet Take 1 tablet by mouth 2 times daily       famotidine (PEPCID) 20 MG tablet Take 1 tablet (20 mg) by mouth 2 times daily 180 tablet 3     fluticasone (FLONASE) 50 MCG/ACT nasal spray SHAKE LIQUID AND USE 1 SPRAY IN EACH NOSTRIL DAILY 16 g 11     loratadine (CLARITIN) 10 MG tablet Take 10 mg by mouth daily Patient takes at bedtime       metFORMIN (GLUCOPHAGE-XR) 500 MG 24 hr tablet Take 4 tablets (2,000 mg) by mouth daily (with breakfast) 360 tablet 3     multivitamin (CENTRUM SILVER) tablet Take 1 tablet by mouth daily       mycophenolate (GENERIC EQUIVALENT) 250 MG capsule Take 3 capsules (750 mg) by mouth 2 times daily 180 capsule 11     sulfamethoxazole-trimethoprim (BACTRIM) 400-80 MG tablet Take 1 tablet by mouth daily 30 tablet 11     tacrolimus (GENERIC EQUIVALENT) 0.5 MG capsule Take ONE cap every PM (0.5 mg every evening) 90 capsule 3     tacrolimus (GENERIC EQUIVALENT) 1 MG capsule TAKE 1 CAPSULE BY MOUTH EVERY MORNING 90 capsule 3     Zinc Sulfate (ZINC 15 PO)          Allergies   Allergen Reactions     Norco [Hydrocodone-Acetaminophen] Nausea and Vomiting     Cats      Throat tightness     Isosorbide Other (See Comments)     hypotension     Penicillins Hives     Seasonal Allergies      rhinitis     Shrimp      Throat closes         Social History     Tobacco Use     Smoking status: Former Smoker     Packs/day: 1.00     Years: 20.00     Pack years: 20.00     Types: Cigarettes     Start date: 1984     Quit date: 1994     Years since quittin.2     Smokeless tobacco: Never Used   Substance Use Topics     Alcohol use: Yes     Comment: Occasionally       History   Drug Use No         Objective     /84   Pulse 88   Temp 97.7  F (36.5  C) (Tympanic)   Resp 16   Ht 1.702 m (5' 7\")   Wt 82.6 kg (182 lb)   SpO2 99%   BMI 28.51 kg/m      Physical Exam  GENERAL APPEARANCE: alert and no distress  HENT: ear canals and TM's normal and nose and mouth without " ulcers or lesions  RESP: lungs clear to auscultation - no rales, rhonchi or wheezes  CV: regular rate and rhythm, normal S1 S2, no S3 or S4 and no murmur, click or rub     Recent Labs   Lab Test 10/12/21  0942 09/07/21  0941 09/07/21  0940 08/18/21  0935 06/07/21  1124 06/07/21  0834 10/12/20  0947 09/14/20  0933 12/19/19  1545 12/10/19  1121 11/22/19  1037 11/22/19  1022   HGB 12.9* 13.3  --    < >   < > 12.9*   < > 13.1*   < > 11.0*   < > 10.0*    206  --    < >   < > 189   < > 188   < > 254   < > 234   INR  --   --   --   --   --   --   --   --   --  1.15*  --  1.17*     --  139  --    < > 139   < > 138   < > 133   < >  --    POTASSIUM 3.7  --  4.1  --    < > 4.2   < > 4.0   < > 4.5   < >  --    CR 1.03  --  1.15  --    < > 1.04   < > 1.03   < > 6.20*   < >  --    A1C  --   --   --   --   --  6.2*  --  6.1*   < > 5.2  --   --     < > = values in this interval not displayed.        Diagnostics:  No labs were ordered during this visit.   EKG and cardiac MRI 10/12/21    Revised Cardiac Risk Index (RCRI):  The patient has the following serious cardiovascular risks for perioperative complications:   - Coronary Artery Disease (MI, positive stress test, angina, Qs on EKG) = 1 point     RCRI Interpretation: 1 point: Class II (low risk - 0.9% complication rate)           Signed Electronically by: Richard Marquez PA-C  Copy of this evaluation report is provided to requesting physician.

## 2021-11-03 ENCOUNTER — TELEPHONE (OUTPATIENT)
Dept: FAMILY MEDICINE | Facility: CLINIC | Age: 66
End: 2021-11-03

## 2021-11-03 NOTE — TELEPHONE ENCOUNTER
KASHMIR,   PRUDENCE Harris called  Needs pre-op exam encounter on 11/01/21 signed   DOS: 11/08/21    Thanks!  Marcie HALL

## 2021-11-04 VITALS
BODY MASS INDEX: 28.56 KG/M2 | TEMPERATURE: 97.7 F | RESPIRATION RATE: 16 BRPM | HEIGHT: 67 IN | WEIGHT: 182 LBS | DIASTOLIC BLOOD PRESSURE: 84 MMHG | HEART RATE: 88 BPM | SYSTOLIC BLOOD PRESSURE: 136 MMHG | OXYGEN SATURATION: 99 %

## 2021-11-05 ENCOUNTER — LAB (OUTPATIENT)
Dept: LAB | Facility: CLINIC | Age: 66
End: 2021-11-05
Payer: MEDICARE

## 2021-11-05 DIAGNOSIS — Z11.59 ENCOUNTER FOR SCREENING FOR OTHER VIRAL DISEASES: ICD-10-CM

## 2021-11-05 PROCEDURE — U0005 INFEC AGEN DETEC AMPLI PROBE: HCPCS

## 2021-11-05 PROCEDURE — U0003 INFECTIOUS AGENT DETECTION BY NUCLEIC ACID (DNA OR RNA); SEVERE ACUTE RESPIRATORY SYNDROME CORONAVIRUS 2 (SARS-COV-2) (CORONAVIRUS DISEASE [COVID-19]), AMPLIFIED PROBE TECHNIQUE, MAKING USE OF HIGH THROUGHPUT TECHNOLOGIES AS DESCRIBED BY CMS-2020-01-R: HCPCS

## 2021-11-06 LAB — SARS-COV-2 RNA RESP QL NAA+PROBE: NEGATIVE

## 2021-11-08 ENCOUNTER — HOSPITAL ENCOUNTER (OUTPATIENT)
Facility: CLINIC | Age: 66
Discharge: HOME | End: 2021-11-08
Attending: INTERNAL MEDICINE | Admitting: INTERNAL MEDICINE
Payer: MEDICARE

## 2021-11-08 ENCOUNTER — ANESTHESIA EVENT (OUTPATIENT)
Dept: SURGERY | Facility: CLINIC | Age: 66
End: 2021-11-08
Payer: MEDICARE

## 2021-11-08 ENCOUNTER — ANESTHESIA (OUTPATIENT)
Dept: SURGERY | Facility: CLINIC | Age: 66
End: 2021-11-08
Payer: MEDICARE

## 2021-11-08 VITALS
DIASTOLIC BLOOD PRESSURE: 86 MMHG | BODY MASS INDEX: 28.16 KG/M2 | SYSTOLIC BLOOD PRESSURE: 144 MMHG | TEMPERATURE: 97 F | HEIGHT: 68 IN | WEIGHT: 185.8 LBS | OXYGEN SATURATION: 97 % | HEART RATE: 75 BPM | RESPIRATION RATE: 16 BRPM

## 2021-11-08 DIAGNOSIS — K86.2 PANCREAS CYST: ICD-10-CM

## 2021-11-08 LAB
AMYLASE FLD-CCNC: 3612 U/L
CEA FLD-MCNC: 883.3 UG/L
UPPER EUS: NORMAL

## 2021-11-08 PROCEDURE — 710N000010 HC RECOVERY PHASE 1, LEVEL 2, PER MIN: Performed by: INTERNAL MEDICINE

## 2021-11-08 PROCEDURE — 370N000017 HC ANESTHESIA TECHNICAL FEE, PER MIN: Performed by: INTERNAL MEDICINE

## 2021-11-08 PROCEDURE — 82150 ASSAY OF AMYLASE: CPT | Performed by: INTERNAL MEDICINE

## 2021-11-08 PROCEDURE — 272N000001 HC OR GENERAL SUPPLY STERILE: Performed by: INTERNAL MEDICINE

## 2021-11-08 PROCEDURE — 250N000009 HC RX 250: Performed by: NURSE ANESTHETIST, CERTIFIED REGISTERED

## 2021-11-08 PROCEDURE — 250N000011 HC RX IP 250 OP 636: Performed by: NURSE ANESTHETIST, CERTIFIED REGISTERED

## 2021-11-08 PROCEDURE — 999N000141 HC STATISTIC PRE-PROCEDURE NURSING ASSESSMENT: Performed by: INTERNAL MEDICINE

## 2021-11-08 PROCEDURE — 710N000012 HC RECOVERY PHASE 2, PER MINUTE: Performed by: INTERNAL MEDICINE

## 2021-11-08 PROCEDURE — 360N000075 HC SURGERY LEVEL 2, PER MIN: Performed by: INTERNAL MEDICINE

## 2021-11-08 PROCEDURE — 88108 CYTOPATH CONCENTRATE TECH: CPT | Mod: TC | Performed by: INTERNAL MEDICINE

## 2021-11-08 PROCEDURE — 258N000003 HC RX IP 258 OP 636: Performed by: NURSE ANESTHETIST, CERTIFIED REGISTERED

## 2021-11-08 RX ORDER — ONDANSETRON 2 MG/ML
4 INJECTION INTRAMUSCULAR; INTRAVENOUS EVERY 30 MIN PRN
Status: DISCONTINUED | OUTPATIENT
Start: 2021-11-08 | End: 2021-11-08 | Stop reason: HOSPADM

## 2021-11-08 RX ORDER — PROPOFOL 10 MG/ML
INJECTION, EMULSION INTRAVENOUS CONTINUOUS PRN
Status: DISCONTINUED | OUTPATIENT
Start: 2021-11-08 | End: 2021-11-08

## 2021-11-08 RX ORDER — ONDANSETRON 2 MG/ML
INJECTION INTRAMUSCULAR; INTRAVENOUS PRN
Status: DISCONTINUED | OUTPATIENT
Start: 2021-11-08 | End: 2021-11-08

## 2021-11-08 RX ORDER — HYDROMORPHONE HCL IN WATER/PF 6 MG/30 ML
0.2 PATIENT CONTROLLED ANALGESIA SYRINGE INTRAVENOUS EVERY 5 MIN PRN
Status: DISCONTINUED | OUTPATIENT
Start: 2021-11-08 | End: 2021-11-08 | Stop reason: HOSPADM

## 2021-11-08 RX ORDER — SODIUM CHLORIDE, SODIUM LACTATE, POTASSIUM CHLORIDE, CALCIUM CHLORIDE 600; 310; 30; 20 MG/100ML; MG/100ML; MG/100ML; MG/100ML
INJECTION, SOLUTION INTRAVENOUS CONTINUOUS
Status: DISCONTINUED | OUTPATIENT
Start: 2021-11-08 | End: 2021-11-08 | Stop reason: HOSPADM

## 2021-11-08 RX ORDER — ONDANSETRON 4 MG/1
4 TABLET, ORALLY DISINTEGRATING ORAL EVERY 30 MIN PRN
Status: DISCONTINUED | OUTPATIENT
Start: 2021-11-08 | End: 2021-11-08 | Stop reason: HOSPADM

## 2021-11-08 RX ORDER — CIPROFLOXACIN 500 MG/1
500 TABLET, FILM COATED ORAL 2 TIMES DAILY
Qty: 6 TABLET | Refills: 0 | Status: SHIPPED | OUTPATIENT
Start: 2021-11-08 | End: 2021-11-11

## 2021-11-08 RX ORDER — SODIUM CHLORIDE, SODIUM LACTATE, POTASSIUM CHLORIDE, CALCIUM CHLORIDE 600; 310; 30; 20 MG/100ML; MG/100ML; MG/100ML; MG/100ML
INJECTION, SOLUTION INTRAVENOUS CONTINUOUS PRN
Status: DISCONTINUED | OUTPATIENT
Start: 2021-11-08 | End: 2021-11-08

## 2021-11-08 RX ORDER — LIDOCAINE HYDROCHLORIDE 20 MG/ML
INJECTION, SOLUTION INFILTRATION; PERINEURAL PRN
Status: DISCONTINUED | OUTPATIENT
Start: 2021-11-08 | End: 2021-11-08

## 2021-11-08 RX ORDER — FENTANYL CITRATE 0.05 MG/ML
25 INJECTION, SOLUTION INTRAMUSCULAR; INTRAVENOUS EVERY 5 MIN PRN
Status: DISCONTINUED | OUTPATIENT
Start: 2021-11-08 | End: 2021-11-08 | Stop reason: HOSPADM

## 2021-11-08 RX ORDER — FENTANYL CITRATE 0.05 MG/ML
25 INJECTION, SOLUTION INTRAMUSCULAR; INTRAVENOUS
Status: DISCONTINUED | OUTPATIENT
Start: 2021-11-08 | End: 2021-11-08 | Stop reason: HOSPADM

## 2021-11-08 RX ORDER — OXYCODONE HYDROCHLORIDE 5 MG/1
5 TABLET ORAL EVERY 4 HOURS PRN
Status: DISCONTINUED | OUTPATIENT
Start: 2021-11-08 | End: 2021-11-08 | Stop reason: HOSPADM

## 2021-11-08 RX ORDER — LIDOCAINE 40 MG/G
CREAM TOPICAL
Status: DISCONTINUED | OUTPATIENT
Start: 2021-11-08 | End: 2021-11-08 | Stop reason: HOSPADM

## 2021-11-08 RX ORDER — MEPERIDINE HYDROCHLORIDE 25 MG/ML
12.5 INJECTION INTRAMUSCULAR; INTRAVENOUS; SUBCUTANEOUS
Status: DISCONTINUED | OUTPATIENT
Start: 2021-11-08 | End: 2021-11-08 | Stop reason: HOSPADM

## 2021-11-08 RX ADMIN — MIDAZOLAM 2 MG: 1 INJECTION INTRAMUSCULAR; INTRAVENOUS at 08:10

## 2021-11-08 RX ADMIN — PROPOFOL 150 MCG/KG/MIN: 10 INJECTION, EMULSION INTRAVENOUS at 08:20

## 2021-11-08 RX ADMIN — SODIUM CHLORIDE, POTASSIUM CHLORIDE, SODIUM LACTATE AND CALCIUM CHLORIDE: 600; 310; 30; 20 INJECTION, SOLUTION INTRAVENOUS at 08:10

## 2021-11-08 RX ADMIN — LIDOCAINE HYDROCHLORIDE 40 MG: 20 INJECTION, SOLUTION INFILTRATION; PERINEURAL at 08:13

## 2021-11-08 RX ADMIN — ONDANSETRON 4 MG: 2 INJECTION INTRAMUSCULAR; INTRAVENOUS at 08:43

## 2021-11-08 RX ADMIN — LIDOCAINE HYDROCHLORIDE 40 MG: 20 INJECTION, SOLUTION INFILTRATION; PERINEURAL at 08:25

## 2021-11-08 ASSESSMENT — ENCOUNTER SYMPTOMS: SEIZURES: 0

## 2021-11-08 ASSESSMENT — MIFFLIN-ST. JEOR: SCORE: 1589.34

## 2021-11-08 ASSESSMENT — COPD QUESTIONNAIRES: COPD: 0

## 2021-11-08 NOTE — LETTER
Patient:  Murray Nicholson  :   1955  MRN:     [id number]        Mr.Emil SANA Nicholson  [address]  [place] [address]        2021    Dear ,    We are writing to inform you of your test results. In short, the analyses of fluid obtained from the largest of your pancreatic cysts again returned consistent with a side branched intraductal mucinous neoplasm without concerning cytology. As discussed previously, our plan includes ongoing surveillance, next with a non-invasive MRI/MRCP in one year.    I have included the formal documtentation of the results below. It continues to be a pleasure participating in your care.  Please feel free to contact our clinic with any further questions.      Sincerely,    Alon Don MD PhD KEIRYG RAHUL ZAMUDIO  Director of Endoscopy  Associate Professor of Medicine, Surgery and Pediatrics  Interventional and Therapeutic Endoscopy    [facility]  Division of Gastroenterology and Hepatology  [facility] [address] [address]    New Consultations  [phone]  Procedure Scheduling [phone]  Clinical Nurse Coordinator [phone]  Clinical Fax   [phone]  Administrative   [phone]  Administrative Fax  [phone]        Resulted Orders   Pancreatic Cyst Panel (Includes Cytology)   Result Value Ref Range    CEA Fluid 883.3 ug/L      Comment:      Performance characteristics for this specimen type are unknown.  Reference ranges have not been established.    Amylase fluid 3,612.0 U/L      Comment:      No reference ranges have been established.  This result should be interpreted in the context of the patient's clinical condition and compared to simultaneous measurement in the patient's blood.   Cytology, non-gynecologic   Result Value Ref Range    Final Diagnosis       Specimen A     Interpretation:      Negative for malignancy     Other Findings:      Mucicarmine stain is negative for  intracellular or extracellular mucin     Adequacy:     Satisfactory for evaluation          Clinical Information       History of multiple pancreatic cysts      Gross Description       A. Pancreas, Pancreatic Cyst Fluid:  Received 0.5 ml of colorless, hazy fluid, processed 1 Pap stained cytospin and 1 fixed cytospin for mucicarmine.       Microscopic Description       Microscopic examination is performed.         Performing Labs       The technical component of this testing was completed at [facility]

## 2021-11-08 NOTE — DISCHARGE INSTRUCTIONS
No blood thinning medications for three days, includes 81 mg aspirin.     Will repeat MRI in one year    Same Day Surgery Discharge Instructions for  Sedation and General Anesthesia       It's not unusual to feel dizzy, light-headed or faint for up to 24 hours after surgery or while taking pain medication.  If you have these symptoms: sit for a few minutes before standing and have someone assist you when you get up to walk or use the bathroom.      You should rest and relax for the next 24 hours. We recommend you make arrangements to have an adult stay with you for at least 24 hours after your discharge.  Avoid hazardous and strenuous activity.      DO NOT DRIVE any vehicle or operate mechanical equipment for 24 hours following the end of your surgery.  Even though you may feel normal, your reactions may be affected by the medication you have received.      Do not drink alcoholic beverages for 24 hours following surgery.       Slowly progress to your regular diet as you feel able. It's not unusual to feel nauseated and/or vomit after receiving anesthesia.  If you develop these symptoms, drink clear liquids (apple juice, ginger ale, broth, 7-up, etc. ) until you feel better.  If your nausea and vomiting persists for 24 hours, please notify your surgeon.        All narcotic pain medications, along with inactivity and anesthesia, can cause constipation. Drinking plenty of liquids and increasing fiber intake will help.      For any questions of a medical nature, call your surgeon.      Do not make important decisions for 24 hours.      If you had general anesthesia, you may have a sore throat for a couple of days related to the breathing tube used during surgery.  You may use Cepacol lozenges to help with this discomfort.  If it worsens or if you develop a fever, contact your surgeon.       If you feel your pain is not well managed with the pain medications prescribed by your surgeon, please contact your surgeon's  office to let them know so they can address your concerns.       CoVid 19 Information    We want to give you information regarding Covid. Please consult your primary care provider with any questions you might have.     Patient who have symptoms (cough, fever, or shortness of breath), need to isolate for 7 days from when symptoms started OR 72 hours after fever resolves (without fever reducing medications) AND improvement of respiratory symptoms (whichever is longer).      Isolate yourself at home (in own room/own bathroom if possible)    Do Not allow any visitors    Do Not go to work or school    Do Not go to [facility],  centers, shopping, or other public places.    Do Not shake hands.    Avoid close and intimate contact with others (hugging, kissing).    Follow CDC recommendations for household cleaning of frequently touched services.     After the initial 7 days, continue to isolate yourself from household members as much as possible. To continue decrease the risk of community spread and exposure, you and any members of your household should limit activities in public for 14 days after starting home isolation.     You can reference the following CDC link for helpful home isolation/care tips:  https://www.cdc.gov/coronavirus/2019-ncov/downloads/10Things.pdf    Protect Others:    Cover Your Mouth and Nose with a mask, disposable tissue or wash cloth to avoid spreading germs to others.    Wash your hands and face frequently with soap and water    Call Your Primary Doctor If: Breathing difficulty develops or you become worse.    For more information about COVID19 and options for caring for yourself at home, please visit the CDC website at https://www.cdc.gov/coronavirus/2019-ncov/about/steps-when-sick.html  For more options for care at [facility], please visit our website at [email or web address]      Reasons to contact your surgeon:    1. Signs of possible infection: Check  your incision daily for redness, swelling, warmth, red streaks or foul drainage.   2. Elevated temperature.  3. Pain not controlled with pain medication and/or rest.   4. Uncontrolled nausea or vomiting.  5. Any questions or concerns.          **If you have questions or concerns about your procedure,   call Dr. Don at [phone]**

## 2021-11-08 NOTE — OR NURSING
Approval given from Dr. Hinton for pt to take transplant medications in PACU.     Pt took those transplant medications while in PACU.

## 2021-11-08 NOTE — ANESTHESIA PREPROCEDURE EVALUATION
Anesthesia Pre-Procedure Evaluation    Patient: Murray Nicholson   MRN: [id number] : 1955        Preoperative Diagnosis: Pancreas cyst [K86.2]    Procedure : Procedure(s):  ENDOSCOPIC ULTRASOUND, ESOPHAGOSCOPY / UPPER GASTROINTESTINAL TRACT (GI)          Past Medical History:   Diagnosis Date     (HFpEF) heart failure with preserved ejection fraction (H)      Allergic rhinitis, cause unspecified      Anemia of chronic kidney failure      Aortic stenosis 2008    Overview:  Bicuspid aortic valve     AS (aortic stenosis)      Ascending aortic aneurysm (H)      Bicuspid aortic valve      Bilateral hand pain 2021     CAD (coronary artery disease)      Congestive heart failure, unspecified      Coronary artery disease involving native artery of transplanted heart without angina pectoris 7/10/2014     Dialysis patient (H)     Tues-Thur-Sat     Dyslipidemia      Esophageal reflux      ESRD (end stage renal disease) (H)      Hearing problem      Heart replaced by transplant (H) 12/10/2018     Hypersomnia with sleep apnea, unspecified      Hypertension      Ileostomy status (H)      Immunosuppression (H)      MGUS (monoclonal gammopathy of unknown significance)      Mitral regurgitation      SHEELA (obstructive sleep apnea)     No CPAP     Pneumonia      Sigmoid diverticulitis 2018     Status post coronary angiogram 2019     Systolic heart failure (H)      Type 2 diabetes mellitus (H)       Past Surgical History:   Procedure Laterality Date     BIOPSY       CARDIAC SURGERY       COLONOSCOPY N/A 5/3/2018    Procedure: COLONOSCOPY;  colonoscopy ;  Surgeon: Ammon Castillo MD;  [facility]     CREATE FISTULA ARTERIOVENOUS UPPER EXTREMITY BOVINE Left 2019    Procedure: Left Upper Extremity Arteriovenous Bovine Graft Creation;  Surgeon: Calin Cheney MD;  [facility]     CV CORONARY ANGIOGRAM N/A 2019    Procedure: CV CORONARY ANGIOGRAM;  Surgeon: Montrell Posada MD;  [facility]ARDI[facility]ATH [facility]     CV CORONARY ANGIOGRAM N/A 7/15/2020    Procedure: CV CORONARY ANGIOGRAM;  Surgeon: Marcelo Ramírez MD;  Location:  HEART CARDIAC CATH LAB     CV CORONARY ANGIOGRAM N/A 6/7/2021    Procedure: CV CORONARY ANGIOGRAM;  Surgeon: Gilberto Mccann MD;  Location:  HEART CARDIAC CATH LAB     CV HEART BIOPSY N/A 2/1/2019    Procedure: HBX;  Surgeon: Montrell Posada MD;  Location:  HEART CARDIAC CATH LAB     CV HEART BIOPSY N/A 3/22/2019    Procedure: HBX, RIJV ACCESS;  Surgeon: Jordan Fox MD;  Location:  HEART CARDIAC CATH LAB     CV HEART BIOPSY N/A 6/28/2019    Procedure: CV HEART BIOPSY;  Surgeon: Montrell Posada MD;  Location:  HEART CARDIAC CATH LAB     CV HEART BIOPSY N/A 10/28/2019    Procedure: CV HEART BIOPSY;  Surgeon: Marcelo Ramírez MD;  Location:  HEART CARDIAC CATH LAB     CV HEART BIOPSY N/A 2/6/2020    Procedure: CV HEART BIOPSY;  Surgeon: Montrell Posada MD;  Location:  HEART CARDIAC CATH LAB     CV HEART BIOPSY N/A 7/15/2020    Procedure: CV HEART BIOPSY;  Surgeon: Marcelo Ramírez MD;  Location:  HEART CARDIAC CATH LAB     CV RIGHT HEART CATH MEASUREMENTS RECORDED N/A 6/28/2019    Procedure: CV RIGHT HEART CATH;  Surgeon: Montrell Posada MD;  Location:  HEART CARDIAC CATH LAB     CV RIGHT HEART CATH MEASUREMENTS RECORDED N/A 7/15/2020    Procedure: CV RIGHT HEART CATH;  Surgeon: Marcelo Ramírez MD;  Location:  HEART CARDIAC CATH LAB     CV RIGHT HEART CATH MEASUREMENTS RECORDED N/A 6/7/2021    Procedure: CV RIGHT HEART CATH;  Surgeon: Gilberto Mccann MD;  Location:  HEART CARDIAC CATH LAB     ESOPHAGOSCOPY, GASTROSCOPY, DUODENOSCOPY (EGD), COMBINED N/A 5/7/2018    Procedure: COMBINED ENDOSCOPIC ULTRASOUND, ESOPHAGOSCOPY, GASTROSCOPY, DUODENOSCOPY (EGD), FINE NEEDLE ASPIRATE/BIOPSY;  Endoscopic Ultrasound with Fine Needle Aspiration ;  Surgeon: Alon Don MD;  [facility]     EXAM UNDER  ANESTHESIA RECTUM N/A 8/12/2018    Procedure: EXAM UNDER ANESTHESIA RECTUM;  EXAM UNDER ANESTHESIA RECTUM ,COMBINED INCISION AND DRAINAGE OF RECTAL ABCESS ;  Surgeon: Rick Tran MD;  [facility]     INCISION AND DRAINAGE RECTUM, COMBINED N/A 8/12/2018    Procedure: COMBINED INCISION AND DRAINAGE RECTUM;;  Surgeon: Rick Tran MD;  [facility]     IR DIALYSIS FISTULOGRAM LEFT  9/25/2019     IR DIALYSIS FISTULOGRAM LEFT  11/22/2019     IR DIALYSIS PTA  9/25/2019     IR DIALYSIS PTA  11/22/2019     LAPAROSCOPIC ASSISTED COLOSTOMY TAKEDOWN N/A 12/11/2018    Procedure: Laparoscopic Assisted Colostomy Takedown, Laparoscopic Lysis of Adhesions;  Surgeon: Rick Tran MD;  [facility]     LAPAROSCOPIC ASSISTED SIGMOID COLECTOMY N/A 8/14/2018    Procedure: LAPAROSCOPIC ASSISTED SIGMOID COLECTOMY;  Laparoscopic Hand Assisted Takedown of Splenic Flexure, Sigmoidectomy, Small Bowel Resection, Takedown of Small Bowel to Colon Fistula;  Surgeon: Rick Tran MD;  [facility]     LAPAROSCOPIC HERNIORRHAPHY INGUINAL BILATERAL Bilateral 7/24/2015    Procedure: LAPAROSCOPIC HERNIORRHAPHY INGUINAL BILATERAL;  Surgeon: oBbby Mcconnell MD;  [facility]     LAPAROSCOPIC INSERTION CATHETER PERITONEAL DIALYSIS N/A 6/22/2017    Procedure: LAPAROSCOPIC INSERTION CATHETER PERITONEAL DIALYSIS;  Laparoscopic Peritoneal Dialysis Catheter Placement - Anesthesia with block;  Surgeon: Esteban Arvizu MD;  [facility]     PICC INSERTION Left 04/22/2018    5Fr - 49cm (3cm external), Basilic vein, low SVC     REMOVE CATHETER PERITONEAL Right 1/15/2018    Procedure: REMOVE CATHETER PERITONEAL;  Open Removal of Peritoneal Dialysis Catheter ;  Surgeon: Esteban Arvizu MD;  [facility]     SIGMOIDOSCOPY FLEXIBLE N/A 11/21/2018    Procedure: Examination Under Anesthesia, Flexible Sigmoidoscopy and Polypectomy;  Surgeon: Rick Tran MD;  [facility]      SIGMOIDOSCOPY FLEXIBLE N/A 2018    Procedure: Flexible Sigmoidoscopy;  Surgeon: Rick Tran MD;  [facility]     TAKEDOWN ILEOSTOMY N/A 3/27/2019    Procedure: Takedown Ileostomy;  Surgeon: Rick Tran MD;  [facility]     TRANSPLANT HEART RECIPIENT N/A 2018    Procedure: TRANSPLANT HEART RECIPIENT;  Median Sternotomy, on-pump oxygenator, Heart Transplant;  Surgeon: Rony Caputo MD;  [facility]     TRANSPLANT KIDNEY RECIPIENT LIVING UNRELATED  2019      Allergies   Allergen Reactions     Norco [Hydrocodone-Acetaminophen] Nausea and Vomiting     Cats      Throat tightness     Isosorbide Other (See Comments)     hypotension     Penicillins Hives     Seasonal Allergies      rhinitis     Shrimp      Throat closes       Social History     Tobacco Use     Smoking status: Former Smoker     Packs/day: 1.00     Years: 20.00     Pack years: 20.00     Types: Cigarettes     Start date: 1984     Quit date: 1994     Years since quittin.2     Smokeless tobacco: Never Used   Substance Use Topics     Alcohol use: Yes     Comment: Occasionally      Wt Readings from Last 1 Encounters:   21 84.3 kg (185 lb 12.8 oz)        Anesthesia Evaluation            ROS/MED HX  ENT/Pulmonary:     (+) sleep apnea, recent URI,  (-) asthma and COPD   Neurologic:    (-) no seizures, no CVA, Delerium and Dementia   Cardiovascular:     (+) Dyslipidemia hypertension--CAD ---CHF     METS/Exercise Tolerance:     Hematologic:     (+) anemia,     Musculoskeletal:       GI/Hepatic:     (+) GERD,  (-) liver disease   Renal/Genitourinary:     (+) renal disease, Pt has history of transplant,     Endo:     (+) type II DM,     Psychiatric/Substance Use:       Infectious Disease:       Malignancy:       Other:            Physical Exam    Airway        Mallampati: I   TM distance: > 3 FB   Neck ROM: full     Respiratory Devices and Support         Dental  no notable dental  history         Cardiovascular   cardiovascular exam normal          Pulmonary           breath sounds clear to auscultation           OUTSIDE LABS:  CBC:   Lab Results   Component Value Date    WBC 4.5 10/12/2021    WBC 4.8 09/07/2021    HGB 12.9 (L) 10/12/2021    HGB 13.3 09/07/2021    HCT 38.5 (L) 10/12/2021    HCT 41.5 09/07/2021     10/12/2021     09/07/2021     BMP:   Lab Results   Component Value Date     10/12/2021     09/07/2021    POTASSIUM 3.7 10/12/2021    POTASSIUM 4.1 09/07/2021    CHLORIDE 104 10/12/2021    CHLORIDE 104 09/07/2021    CO2 27 10/12/2021    CO2 26 09/07/2021    BUN 17 10/12/2021    BUN 18 09/07/2021    CR 1.03 10/12/2021    CR 1.15 09/07/2021     (H) 10/12/2021     (H) 09/07/2021     COAGS:   Lab Results   Component Value Date    PTT 33 05/08/2019    INR 1.15 (H) 12/10/2019    FIBR 407 08/12/2018     POC:   Lab Results   Component Value Date     (H) 06/07/2021     HEPATIC:   Lab Results   Component Value Date    ALBUMIN 3.8 06/07/2021    PROTTOTAL 7.3 06/07/2021    ALT 28 06/07/2021    AST 27 06/07/2021    ALKPHOS 101 06/07/2021    BILITOTAL 1.6 (H) 06/07/2021     OTHER:   Lab Results   Component Value Date    PH 7.30 (L) 12/19/2019    LACT 1.3 09/02/2018    A1C 6.2 (H) 06/07/2021    TRINI 9.2 10/12/2021    PHOS 2.7 10/12/2021    MAG 1.4 (L) 10/12/2021    AMYLASE 62 06/14/2018    TSH 1.89 09/07/2021    CRP <2.9 01/20/2020    SED 28 (H) 01/20/2020       Anesthesia Plan    ASA Status:  3      Anesthesia Type: MAC.              Consents    Anesthesia Plan(s) and associated risks, benefits, and realistic alternatives discussed. Questions answered and patient/representative(s) expressed understanding.     - Discussed with:  Patient         Postoperative Care            Comments:                Marcie Hinton

## 2021-11-08 NOTE — OR NURSING
Dr. Don bedside speaking with pt.    Told pt no blood thinners for three days, including 81 mg aspirin.     Repeat MRI in one year

## 2021-11-08 NOTE — ANESTHESIA CARE TRANSFER NOTE
Patient: Murray Nicholosn    Procedure: Procedure(s):  ENDOSCOPIC ULTRASOUND, ESOPHAGOSCOPY / UPPER GASTROINTESTINAL TRACT (GI)  EUS with FNA       Diagnosis: Pancreas cyst [K86.2]  Diagnosis Additional Information: No value filed.    Anesthesia Type:   MAC     Note:    Oropharynx: oropharynx clear of all foreign objects and spontaneously breathing    Oxygen Supplementation: room air    Independent Airway: airway patency satisfactory and stable  Dentition: dentition unchanged  Vital Signs Stable: post-procedure vital signs reviewed and stable  Report to RN Given: handoff report given  Patient transferred to: PACU  Comments: At end of procedure, spontaneous respirations, patient alert to voice, able to follow commands. Patient breathing room air at 6 liters per minute to PACU. Oxygen tubing connected to wall O2 in PACU, SpO2, NiBP, and EKG monitors and alarms on and functioning, Elizabeth Hugger warmer connected to patient gown, report on patient's clinical status given to PACU RN, RN questions answered.  Handoff Report: Identifed the Patient, Identified the Reponsible Provider, Reviewed the pertinent medical history, Discussed the surgical course, Reviewed Intra-OP anesthesia mangement and issues during anesthesia, Set expectations for post-procedure period and Allowed opportunity for questions and acknowledgement of understanding      Vitals:  Vitals Value Taken Time   /88 11/08/21 0853   Temp     Pulse 71 11/08/21 0857   Resp 11 11/08/21 0857   SpO2 100 % 11/08/21 0857   Vitals shown include unvalidated device data.    Electronically Signed By: VERONICA Olivo CRNA  November 8, 2021  8:58 AM

## 2021-11-08 NOTE — ANESTHESIA POSTPROCEDURE EVALUATION
Patient: Murray Nicohlson    Procedure: Procedure(s):  ENDOSCOPIC ULTRASOUND, ESOPHAGOSCOPY / UPPER GASTROINTESTINAL TRACT (GI)  EUS with FNA       Diagnosis:Pancreas cyst [K86.2]  Diagnosis Additional Information: No value filed.    Anesthesia Type:  MAC    Note:  Disposition: Outpatient   Postop Pain Control: Uneventful            Sign Out: Well controlled pain   PONV: No   Neuro/Psych: Uneventful            Sign Out: Acceptable/Baseline neuro status   Airway/Respiratory: Uneventful            Sign Out: Acceptable/Baseline resp. status   CV/Hemodynamics: Uneventful            Sign Out: Acceptable CV status   Other NRE:    DID A NON-ROUTINE EVENT OCCUR? No           Last vitals:  Vitals Value Taken Time   /110 11/08/21 0930   Temp 36.1  C (97  F) 11/08/21 0930   Pulse 71 11/08/21 0930   Resp 16 11/08/21 0930   SpO2 97 % 11/08/21 0930       Electronically Signed By: Marcie iHnton  November 8, 2021  12:06 PM

## 2021-11-10 LAB
PATH REPORT.COMMENTS IMP SPEC: NORMAL
PATH REPORT.FINAL DX SPEC: NORMAL
PATH REPORT.GROSS SPEC: NORMAL
PATH REPORT.MICROSCOPIC SPEC OTHER STN: NORMAL
PATH REPORT.RELEVANT HX SPEC: NORMAL

## 2021-11-10 PROCEDURE — 88108 CYTOPATH CONCENTRATE TECH: CPT | Mod: 26 | Performed by: PATHOLOGY

## 2021-11-10 PROCEDURE — 88313 SPECIAL STAINS GROUP 2: CPT | Mod: 26 | Performed by: PATHOLOGY

## 2021-11-12 ENCOUNTER — PATIENT OUTREACH (OUTPATIENT)
Dept: GASTROENTEROLOGY | Facility: CLINIC | Age: 66
End: 2021-11-12

## 2021-11-12 ENCOUNTER — ANCILLARY PROCEDURE (OUTPATIENT)
Dept: MRI IMAGING | Facility: CLINIC | Age: 66
End: 2021-11-12
Attending: OTOLARYNGOLOGY
Payer: MEDICARE

## 2021-11-12 DIAGNOSIS — H90.3 ASYMMETRIC SNHL (SENSORINEURAL HEARING LOSS): ICD-10-CM

## 2021-11-12 PROCEDURE — G1004 CDSM NDSC: HCPCS | Mod: GC | Performed by: RADIOLOGY

## 2021-11-12 PROCEDURE — 70553 MRI BRAIN STEM W/O & W/DYE: CPT | Mod: MG | Performed by: RADIOLOGY

## 2021-11-12 PROCEDURE — A9585 GADOBUTROL INJECTION: HCPCS | Performed by: RADIOLOGY

## 2021-11-12 RX ORDER — GADOBUTROL 604.72 MG/ML
10 INJECTION INTRAVENOUS ONCE
Status: COMPLETED | OUTPATIENT
Start: 2021-11-12 | End: 2021-11-12

## 2021-11-12 RX ADMIN — GADOBUTROL 8.5 ML: 604.72 INJECTION INTRAVENOUS at 12:17

## 2021-11-12 NOTE — DISCHARGE INSTRUCTIONS
MRI Contrast Discharge Instructions    The IV contrast you received today will pass out of your body in your  urine. This will happen in the next 24 hours. You will not feel this process.  Your urine will not change color.    Drink at least 4 extra glasses of water or juice today (unless your doctor  has restricted your fluids). This reduces the stress on your kidneys.  You may take your regular medicines.    If you are on dialysis: It is best to have dialysis today.    If you have a reaction: Most reactions happen right away. If you have  any new symptoms after leaving the hospital (such as hives or swelling),  call your hospital at the correct number below. Or call your family doctor.  If you have breathing distress or wheezing, call 911.    Special instructions: ***    I have read and understand the above information.    Signature:______________________________________ Date:___________    Staff:__________________________________________ Date:___________     Time:__________    [facility] Departments:    ___[place]: [phone]  ___[facility]: [phone]  ___[place]: [phone] ___[facility]: [phone]  ___[facility]: [phone]  ___[facility]: [phone]  ___[phone]  ___[facility]: [phone]  ___[phone]

## 2021-11-12 NOTE — TELEPHONE ENCOUNTER
"Pt called in, c/o some abdominal sorness after procedure \"like I've been doing sit ups\", wonder if its normal. C/o muscle pain, is taking simethicone for gas, did not do ab work out today. No fever or other symptoms or other changes in VSS that's he's noted. Reviewed with patient that some discomfort after procedure isn't uncommon and with no other s/sx is not concering. Advised to monitor for now and that Dr. Don will reach out with results from biopsy and w/ follow up steps as able.     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

## 2021-11-15 DIAGNOSIS — R93.0 ABNORMAL HEAD MRI: Primary | ICD-10-CM

## 2021-11-16 ENCOUNTER — LAB (OUTPATIENT)
Dept: LAB | Facility: CLINIC | Age: 66
End: 2021-11-16
Payer: MEDICARE

## 2021-11-16 DIAGNOSIS — Z79.899 ENCOUNTER FOR LONG-TERM CURRENT USE OF MEDICATION: ICD-10-CM

## 2021-11-16 DIAGNOSIS — Z48.298 AFTERCARE FOLLOWING ORGAN TRANSPLANT: ICD-10-CM

## 2021-11-16 DIAGNOSIS — Z94.0 KIDNEY REPLACED BY TRANSPLANT: ICD-10-CM

## 2021-11-16 LAB
ANION GAP SERPL CALCULATED.3IONS-SCNC: 4 MMOL/L (ref 3–14)
BUN SERPL-MCNC: 19 MG/DL (ref 7–30)
CALCIUM SERPL-MCNC: 9.6 MG/DL (ref 8.5–10.1)
CHLORIDE BLD-SCNC: 108 MMOL/L (ref 94–109)
CO2 SERPL-SCNC: 24 MMOL/L (ref 20–32)
CREAT SERPL-MCNC: 0.93 MG/DL (ref 0.66–1.25)
ERYTHROCYTE [DISTWIDTH] IN BLOOD BY AUTOMATED COUNT: 13.2 % (ref 10–15)
GFR SERPL CREATININE-BSD FRML MDRD: 85 ML/MIN/1.73M2
GLUCOSE BLD-MCNC: 170 MG/DL (ref 70–99)
HCT VFR BLD AUTO: 40.3 % (ref 40–53)
HGB BLD-MCNC: 13.3 G/DL (ref 13.3–17.7)
MCH RBC QN AUTO: 29.6 PG (ref 26.5–33)
MCHC RBC AUTO-ENTMCNC: 33 G/DL (ref 31.5–36.5)
MCV RBC AUTO: 90 FL (ref 78–100)
PLATELET # BLD AUTO: 201 10E3/UL (ref 150–450)
POTASSIUM BLD-SCNC: 4 MMOL/L (ref 3.4–5.3)
RBC # BLD AUTO: 4.49 10E6/UL (ref 4.4–5.9)
SODIUM SERPL-SCNC: 136 MMOL/L (ref 133–144)
TACROLIMUS BLD-MCNC: 5.8 UG/L (ref 5–15)
TME LAST DOSE: NORMAL H
TME LAST DOSE: NORMAL H
WBC # BLD AUTO: 5.2 10E3/UL (ref 4–11)

## 2021-11-16 PROCEDURE — 80197 ASSAY OF TACROLIMUS: CPT | Performed by: INTERNAL MEDICINE

## 2021-11-16 PROCEDURE — 85027 COMPLETE CBC AUTOMATED: CPT | Performed by: PATHOLOGY

## 2021-11-16 PROCEDURE — 36415 COLL VENOUS BLD VENIPUNCTURE: CPT | Performed by: PATHOLOGY

## 2021-11-16 PROCEDURE — 80048 BASIC METABOLIC PNL TOTAL CA: CPT | Performed by: PATHOLOGY

## 2021-11-16 PROCEDURE — 87799 DETECT AGENT NOS DNA QUANT: CPT | Performed by: INTERNAL MEDICINE

## 2021-11-17 LAB — BKV DNA # SPEC NAA+PROBE: NOT DETECTED COPIES/ML

## 2021-11-18 DIAGNOSIS — K86.2 PANCREAS CYST: Primary | ICD-10-CM

## 2021-12-02 ENCOUNTER — OFFICE VISIT (OUTPATIENT)
Dept: FAMILY MEDICINE | Facility: CLINIC | Age: 66
End: 2021-12-02
Payer: MEDICARE

## 2021-12-02 VITALS
RESPIRATION RATE: 16 BRPM | TEMPERATURE: 97.3 F | WEIGHT: 178 LBS | SYSTOLIC BLOOD PRESSURE: 110 MMHG | DIASTOLIC BLOOD PRESSURE: 60 MMHG | HEART RATE: 92 BPM | OXYGEN SATURATION: 100 % | BODY MASS INDEX: 27.47 KG/M2

## 2021-12-02 DIAGNOSIS — K43.9 VENTRAL HERNIA WITHOUT OBSTRUCTION OR GANGRENE: Primary | ICD-10-CM

## 2021-12-02 DIAGNOSIS — E11.22 TYPE 2 DIABETES MELLITUS WITH STAGE 4 CHRONIC KIDNEY DISEASE, WITHOUT LONG-TERM CURRENT USE OF INSULIN (H): ICD-10-CM

## 2021-12-02 DIAGNOSIS — Z11.52 ENCOUNTER FOR SCREENING FOR COVID-19: ICD-10-CM

## 2021-12-02 DIAGNOSIS — N18.4 TYPE 2 DIABETES MELLITUS WITH STAGE 4 CHRONIC KIDNEY DISEASE, WITHOUT LONG-TERM CURRENT USE OF INSULIN (H): ICD-10-CM

## 2021-12-02 DIAGNOSIS — L98.9 SKIN LESION: ICD-10-CM

## 2021-12-02 PROCEDURE — U0003 INFECTIOUS AGENT DETECTION BY NUCLEIC ACID (DNA OR RNA); SEVERE ACUTE RESPIRATORY SYNDROME CORONAVIRUS 2 (SARS-COV-2) (CORONAVIRUS DISEASE [COVID-19]), AMPLIFIED PROBE TECHNIQUE, MAKING USE OF HIGH THROUGHPUT TECHNOLOGIES AS DESCRIBED BY CMS-2020-01-R: HCPCS | Performed by: NURSE PRACTITIONER

## 2021-12-02 PROCEDURE — U0005 INFEC AGEN DETEC AMPLI PROBE: HCPCS | Performed by: NURSE PRACTITIONER

## 2021-12-02 PROCEDURE — 99213 OFFICE O/P EST LOW 20 MIN: CPT | Performed by: NURSE PRACTITIONER

## 2021-12-02 NOTE — PROGRESS NOTES
Assessment & Plan     Ventral hernia without obstruction or gangrene  Abdomina pain likely due to ventral hernia.  It is possible he has some adhesions from his multiple previous abdominal surgeries.  No evidence of bowel obstruction, pain is mostly positional.  Advised to continue wearing abdominal binder since he finds some relief from this  - Adult General Surg Referral; Future      Skin lesion  Mild skin lesion that is improving.  Advised him to continue to monitor, if it persists or worsens I will place referral to Derm.      Type 2 diabetes mellitus with stage 4 chronic kidney disease, without long-term current use of insulin (H)   Stable, tolerating med, no changes at this time  - Albumin Random Urine Quantitative with Creat Ratio; Future    Encounter for screening for COVID-19  - Asymptomatic COVID-19 Virus (Coronavirus) by PCR; Future  - Asymptomatic COVID-19 Virus (Coronavirus) by PCR                     No follow-ups on file.    Alicia Shearer NP  [facility]    Dino Gao is a 66 year old who presents for the following health issues     History of Present Illness       He eats 2-3 servings of fruits and vegetables daily.He consumes 1 sweetened beverage(s) daily.He exercises with enough effort to increase his heart rate 9 or less minutes per day.  He exercises with enough effort to increase his heart rate 3 or less days per week.   He is taking medications regularly.     ENDOSCOPY Follow-up for abdominal pain     Concern - Abdominal pain   Onset: 4 weeks ago  Description: pt states he has a sharp shooting pain when he stands up.   Intensity: severe  Progression of Symptoms:  worsening and intermittent  Accompanying Signs & Symptoms: none  Previous history of similar problem: none  Precipitating factors:        Worsened by: none  Alleviating factors:        Improved by: none  Therapies tried and outcome:  none     Sore on left buttock that started after renal Tx, has  been previously treated with valcyclovir, recurring. Was managed by derm in the past  Treating with antibiotic ointment currently, states it is improving      Abdominal pain after endoscopy.  Was improving, worse this past week.  Previous hernia operation 2 years before transplant.  Feeling good after transplant.  Had helped lift someone who fell in 2020, felt popping.  Noticed a bulge.  Had previous colon resection secondary to C-Diff.  Abdominal binder helped.  Denies vomiting or changes in bowel habits    Brought diabetes/BP/weight log.  Blood sugars appear well controlled at present.    Would like COVID test today.        Review of Systems   Constitutional, HEENT, cardiovascular, pulmonary, gi and gu systems are negative, except as otherwise noted.      Objective    There were no vitals taken for this visit.  There is no height or weight on file to calculate BMI.  Physical Exam   GENERAL: healthy, alert and no distress  RESP: no rhonchi and no wheezes  ABDOMEN: soft, nontender,and bowel sounds normal.  Multiple old surgical incisions, large ventral hernia in the lower abdomen, pelvic area.   (male): normal male genitalia without hernia  MS: no gross musculoskeletal defects noted, no edema  SKIN:small flat area of excoriation on left buttock

## 2021-12-02 NOTE — PATIENT INSTRUCTIONS
-Schedule with general surgery to discuss hernia  -Microalbumin on 12/7 with other labs  -Please schedule eye exam at your

## 2021-12-03 LAB — SARS-COV-2 RNA RESP QL NAA+PROBE: NEGATIVE

## 2021-12-06 NOTE — TELEPHONE ENCOUNTER
REFERRAL INFORMATION:    Referring Provider:  Alicia Shearer NP     Referring Clinic:  [facility]     Reason for Visit/Diagnosis: Ventral hernia        FUTURE VISIT INFORMATION:    Appointment Date: 12/7/2021    Appointment Time: 10:30 AM      NOTES RECORD STATUS  DETAILS   OFFICE NOTE from Referring Provider Internal 12/2/2021 Office visit with Alicia Sheraer NP     OFFICE NOTE from Other Specialists N/A    HOSPITAL DISCHARGE SUMMARY/ ED VISITS  N/A    OPERATIVE REPORT N/A    ENDOSCOPY (EGD)  N/A    PERTINENT LABS Internal    PATHOLOGY REPORTS (RELATED) N/A    IMAGING (CT, MRI, US, XR)  Internal MR Abdomen: 3/18/2020

## 2021-12-07 ENCOUNTER — PRE VISIT (OUTPATIENT)
Dept: SURGERY | Facility: CLINIC | Age: 66
End: 2021-12-07

## 2021-12-07 ENCOUNTER — LAB (OUTPATIENT)
Dept: LAB | Facility: CLINIC | Age: 66
End: 2021-12-07
Attending: INTERNAL MEDICINE
Payer: MEDICARE

## 2021-12-07 ENCOUNTER — OFFICE VISIT (OUTPATIENT)
Dept: SURGERY | Facility: CLINIC | Age: 66
End: 2021-12-07
Attending: NURSE PRACTITIONER
Payer: MEDICARE

## 2021-12-07 VITALS
HEIGHT: 68 IN | BODY MASS INDEX: 27.22 KG/M2 | SYSTOLIC BLOOD PRESSURE: 115 MMHG | OXYGEN SATURATION: 100 % | WEIGHT: 179.6 LBS | HEART RATE: 95 BPM | DIASTOLIC BLOOD PRESSURE: 76 MMHG

## 2021-12-07 DIAGNOSIS — K43.9 VENTRAL HERNIA WITHOUT OBSTRUCTION OR GANGRENE: ICD-10-CM

## 2021-12-07 DIAGNOSIS — E11.22 TYPE 2 DIABETES MELLITUS WITH STAGE 4 CHRONIC KIDNEY DISEASE, WITHOUT LONG-TERM CURRENT USE OF INSULIN (H): ICD-10-CM

## 2021-12-07 DIAGNOSIS — Z79.899 ENCOUNTER FOR LONG-TERM CURRENT USE OF MEDICATION: ICD-10-CM

## 2021-12-07 DIAGNOSIS — N18.4 TYPE 2 DIABETES MELLITUS WITH STAGE 4 CHRONIC KIDNEY DISEASE, WITHOUT LONG-TERM CURRENT USE OF INSULIN (H): ICD-10-CM

## 2021-12-07 DIAGNOSIS — Z94.0 KIDNEY REPLACED BY TRANSPLANT: ICD-10-CM

## 2021-12-07 DIAGNOSIS — Z48.298 AFTERCARE FOLLOWING ORGAN TRANSPLANT: ICD-10-CM

## 2021-12-07 LAB
ANION GAP SERPL CALCULATED.3IONS-SCNC: 9 MMOL/L (ref 3–14)
BUN SERPL-MCNC: 18 MG/DL (ref 7–30)
CALCIUM SERPL-MCNC: 10 MG/DL (ref 8.5–10.1)
CHLORIDE BLD-SCNC: 104 MMOL/L (ref 94–109)
CO2 SERPL-SCNC: 26 MMOL/L (ref 20–32)
CREAT SERPL-MCNC: 1.06 MG/DL (ref 0.66–1.25)
CREAT UR-MCNC: 150 MG/DL
ERYTHROCYTE [DISTWIDTH] IN BLOOD BY AUTOMATED COUNT: 13 % (ref 10–15)
GFR SERPL CREATININE-BSD FRML MDRD: 73 ML/MIN/1.73M2
GLUCOSE BLD-MCNC: 188 MG/DL (ref 70–99)
HCT VFR BLD AUTO: 41.2 % (ref 40–53)
HGB BLD-MCNC: 13.4 G/DL (ref 13.3–17.7)
MCH RBC QN AUTO: 29.3 PG (ref 26.5–33)
MCHC RBC AUTO-ENTMCNC: 32.5 G/DL (ref 31.5–36.5)
MCV RBC AUTO: 90 FL (ref 78–100)
MICROALBUMIN UR-MCNC: 11 MG/L
MICROALBUMIN/CREAT UR: 7.33 MG/G CR (ref 0–17)
PLATELET # BLD AUTO: 225 10E3/UL (ref 150–450)
POTASSIUM BLD-SCNC: 4 MMOL/L (ref 3.4–5.3)
RBC # BLD AUTO: 4.57 10E6/UL (ref 4.4–5.9)
SODIUM SERPL-SCNC: 139 MMOL/L (ref 133–144)
TACROLIMUS BLD-MCNC: 7.4 UG/L (ref 5–15)
TME LAST DOSE: NORMAL H
TME LAST DOSE: NORMAL H
WBC # BLD AUTO: 6.1 10E3/UL (ref 4–11)

## 2021-12-07 PROCEDURE — 80048 BASIC METABOLIC PNL TOTAL CA: CPT | Performed by: PATHOLOGY

## 2021-12-07 PROCEDURE — 36415 COLL VENOUS BLD VENIPUNCTURE: CPT | Performed by: PATHOLOGY

## 2021-12-07 PROCEDURE — 99204 OFFICE O/P NEW MOD 45 MIN: CPT | Performed by: SURGERY

## 2021-12-07 PROCEDURE — 85027 COMPLETE CBC AUTOMATED: CPT | Performed by: PATHOLOGY

## 2021-12-07 PROCEDURE — 82043 UR ALBUMIN QUANTITATIVE: CPT | Performed by: PATHOLOGY

## 2021-12-07 PROCEDURE — 80197 ASSAY OF TACROLIMUS: CPT | Performed by: INTERNAL MEDICINE

## 2021-12-07 PROCEDURE — 87799 DETECT AGENT NOS DNA QUANT: CPT | Performed by: INTERNAL MEDICINE

## 2021-12-07 ASSESSMENT — PAIN SCALES - GENERAL: PAINLEVEL: NO PAIN (0)

## 2021-12-07 ASSESSMENT — MIFFLIN-ST. JEOR: SCORE: 1561.22

## 2021-12-07 NOTE — PROGRESS NOTES
Murray Nicholson is a 66 year old male with a 1 year history of a lower abdominal mass associated with the following symptoms of lump and pain.      Onset did occur with lifting.  Obstructive symptoms:  no  Urinary difficulties:  Yes at times  Chronic cough: no  Constipation:  no  Current level of activity:  Medium, works for [employer], up on feet 8hrs/day    Past medical history, medications, allergies, family history, and social history were reviewed with the patient.    Past Medical History:   Diagnosis Date     (HFpEF) heart failure with preserved ejection fraction (H)      Allergic rhinitis, cause unspecified      Anemia of chronic kidney failure      Aortic stenosis 8/13/2008    Overview:  Bicuspid aortic valve     AS (aortic stenosis)      Ascending aortic aneurysm (H)      Bicuspid aortic valve      Bilateral hand pain 6/1/2021     CAD (coronary artery disease)      Congestive heart failure, unspecified      Coronary artery disease involving native artery of transplanted heart without angina pectoris 7/10/2014     Dialysis patient (H)     Tues-Thur-Sat     Dyslipidemia      Esophageal reflux      ESRD (end stage renal disease) (H)      Hearing problem      Heart replaced by transplant (H) 12/10/2018     Hypersomnia with sleep apnea, unspecified      Hypertension      Ileostomy status (H)      Immunosuppression (H)      MGUS (monoclonal gammopathy of unknown significance)      Mitral regurgitation      SHEELA (obstructive sleep apnea)     No CPAP     Pneumonia      Sigmoid diverticulitis 8/14/2018     Status post coronary angiogram 6/28/2019     Systolic heart failure (H)      Type 2 diabetes mellitus (H)        Past Surgical History:   Procedure Laterality Date     BIOPSY       CARDIAC SURGERY       COLONOSCOPY N/A 5/3/2018    Procedure: COLONOSCOPY;  colonoscopy ;  Surgeon: Ammon Castillo MD;  [facility]     CREATE FISTULA ARTERIOVENOUS UPPER EXTREMITY BOVINE Left 5/8/2019    Procedure: Left Upper  Extremity Arteriovenous Bovine Graft Creation;  Surgeon: Calin Cheney MD;  [facility]     CV CORONARY ANGIOGRAM N/A 6/28/2019    Procedure: CV CORONARY ANGIOGRAM;  Surgeon: Montrell Posada MD;  [facility]     CV CORONARY ANGIOGRAM N/A 7/15/2020    Procedure: CV CORONARY ANGIOGRAM;  Surgeon: Marcelo Ramírez MD;  [facility]     CV CORONARY ANGIOGRAM N/A 6/7/2021    Procedure: CV CORONARY ANGIOGRAM;  Surgeon: Gilberto Mccann MD;  [facility]     CV HEART BIOPSY N/A 2/1/2019    Procedure: HBX;  Surgeon: Montrell Posada MD;  [facility]     CV HEART BIOPSY N/A 3/22/2019    Procedure: HBX, RIJV ACCESS;  Surgeon: Jordan Fox MD;  [facility]     CV HEART BIOPSY N/A 6/28/2019    Procedure: CV HEART BIOPSY;  Surgeon: Montrell Posada MD;  [facility]     CV HEART BIOPSY N/A 10/28/2019    Procedure: CV HEART BIOPSY;  Surgeon: Marcelo Ramírez MD;  [facility]     CV HEART BIOPSY N/A 2/6/2020    Procedure: CV HEART BIOPSY;  Surgeon: Montrell Posada MD;  [facility]     CV HEART BIOPSY N/A 7/15/2020    Procedure: CV HEART BIOPSY;  Surgeon: Marcelo Ramírez MD;  [facility]     CV RIGHT HEART CATH MEASUREMENTS RECORDED N/A 6/28/2019    Procedure: CV RIGHT HEART CATH;  Surgeon: Montrell Posada MD;  [facility]     CV RIGHT HEART CATH MEASUREMENTS RECORDED N/A 7/15/2020    Procedure: CV RIGHT HEART CATH;  Surgeon: Marcelo Ramírez MD;  [facility]     CV RIGHT HEART CATH MEASUREMENTS RECORDED N/A 6/7/2021    Procedure: CV RIGHT HEART CATH;  Surgeon: Gilberto Mccann MD;  [facility]     ENDOSCOPIC ULTRASOUND UPPER GASTROINTESTINAL TRACT (GI) N/A 11/8/2021    Procedure: ENDOSCOPIC  ULTRASOUND, ESOPHAGOSCOPY / UPPER GASTROINTESTINAL TRACT (GI)  EUS with FNA;  Surgeon: Alon Don MD;  [facility]     ESOPHAGOSCOPY, GASTROSCOPY, DUODENOSCOPY (EGD), COMBINED N/A 5/7/2018    Procedure: COMBINED ENDOSCOPIC ULTRASOUND, ESOPHAGOSCOPY, GASTROSCOPY, DUODENOSCOPY (EGD), FINE NEEDLE ASPIRATE/BIOPSY;  Endoscopic Ultrasound with Fine Needle Aspiration ;  Surgeon: Alon Don MD;  [facility]     EXAM UNDER ANESTHESIA RECTUM N/A 8/12/2018    Procedure: EXAM UNDER ANESTHESIA RECTUM;  EXAM UNDER ANESTHESIA RECTUM ,COMBINED INCISION AND DRAINAGE OF RECTAL ABCESS ;  Surgeon: Rick Tran MD;  [facility]     INCISION AND DRAINAGE RECTUM, COMBINED N/A 8/12/2018    Procedure: COMBINED INCISION AND DRAINAGE RECTUM;;  Surgeon: Rick Tran MD;  [facility]     IR DIALYSIS FISTULOGRAM LEFT  9/25/2019     IR DIALYSIS FISTULOGRAM LEFT  11/22/2019     IR DIALYSIS PTA  9/25/2019     IR DIALYSIS PTA  11/22/2019     LAPAROSCOPIC ASSISTED COLOSTOMY TAKEDOWN N/A 12/11/2018    Procedure: Laparoscopic Assisted Colostomy Takedown, Laparoscopic Lysis of Adhesions;  Surgeon: Rick Tran MD;  [facility]     LAPAROSCOPIC ASSISTED SIGMOID COLECTOMY N/A 8/14/2018    Procedure: LAPAROSCOPIC ASSISTED SIGMOID COLECTOMY;  Laparoscopic Hand Assisted Takedown of Splenic Flexure, Sigmoidectomy, Small Bowel Resection, Takedown of Small Bowel to Colon Fistula;  Surgeon: Rick Tran MD;  [facility]     LAPAROSCOPIC HERNIORRHAPHY INGUINAL BILATERAL Bilateral 7/24/2015    Procedure: LAPAROSCOPIC HERNIORRHAPHY INGUINAL BILATERAL;  Surgeon: Bobby Mcconnell MD;  [facility]     LAPAROSCOPIC INSERTION CATHETER PERITONEAL DIALYSIS N/A 6/22/2017    Procedure: LAPAROSCOPIC INSERTION CATHETER PERITONEAL DIALYSIS;  Laparoscopic Peritoneal Dialysis Catheter Placement - Anesthesia with block;  Surgeon: Esteban Arvizu MD;  [facility]      "PICC INSERTION Left 04/22/2018    5Fr - 49cm (3cm external), Basilic vein, low SVC     REMOVE CATHETER PERITONEAL Right 1/15/2018    Procedure: REMOVE CATHETER PERITONEAL;  Open Removal of Peritoneal Dialysis Catheter ;  Surgeon: Esteban Arvizu MD;  [facility]     SIGMOIDOSCOPY FLEXIBLE N/A 11/21/2018    Procedure: Examination Under Anesthesia, Flexible Sigmoidoscopy and Polypectomy;  Surgeon: Rick Tran MD;  [facility]     SIGMOIDOSCOPY FLEXIBLE N/A 12/11/2018    Procedure: Flexible Sigmoidoscopy;  Surgeon: Rick Tran MD;  [facility]     TAKEDOWN ILEOSTOMY N/A 3/27/2019    Procedure: Takedown Ileostomy;  Surgeon: Rick Tran MD;  [facility]     TRANSPLANT HEART RECIPIENT N/A 6/14/2018    Procedure: TRANSPLANT HEART RECIPIENT;  Median Sternotomy, on-pump oxygenator, Heart Transplant;  Surgeon: Rony Caputo MD;  [facility]     TRANSPLANT KIDNEY RECIPIENT LIVING UNRELATED  12/2019       ROS: 10 point review of systems negative except noted in HPI    PHYSICAL EXAM  General appearance- animated, alert, and in no distress.  Neck- Neck is supple without obvious adenopathy.  Lungs- Respiratory effort unlabored.  Gait- Normal.  Abdomen - soft non distended, non tender with multiple well healed scars and bulge at lower aspect of abdomen.    Impression: ventral hernia in higher risk patient well compensated and active at work. Would benefit from elective repair in spite of higher risks.  Recommend:  CT abdomen and pelvis for operative planning.  I will call with results and discuss surgical options.    \"Total time = 45 minutes, spent on the date of encounter doing chart review, history and physical, documentation, patient education, and any further activity as noted above.         "

## 2021-12-07 NOTE — NURSING NOTE
"Chief Complaint   Patient presents with     New Patient     Possible ventral hernia        Vitals:    12/07/21 1012   BP: 115/76   BP Location: Right arm   Patient Position: Sitting   Cuff Size: Adult Regular   Pulse: 95   SpO2: 100%   Weight: 81.5 kg (179 lb 9.6 oz)   Height: 1.715 m (5' 7.5\")       Body mass index is 27.71 kg/m .                          Charmaine Mason, EMT    "

## 2021-12-07 NOTE — LETTER
12/7/2021       RE: Murray Nicholson  [address]  [place] [address]     Dear Colleague,    Thank you for referring your patient, Murray Nicholson, to the [facility] [place] [facility]. Please see a copy of my visit note below.    Murray Nicholson is a 66 year old male with a 1 year history of a lower abdominal mass associated with the following symptoms of lump and pain.      Onset did occur with lifting.  Obstructive symptoms:  no  Urinary difficulties:  Yes at times  Chronic cough: no  Constipation:  no  Current level of activity:  Medium, works for [employer], up on feet 8hrs/day    Past medical history, medications, allergies, family history, and social history were reviewed with the patient.    Past Medical History:   Diagnosis Date     (HFpEF) heart failure with preserved ejection fraction (H)      Allergic rhinitis, cause unspecified      Anemia of chronic kidney failure      Aortic stenosis 8/13/2008    Overview:  Bicuspid aortic valve     AS (aortic stenosis)      Ascending aortic aneurysm (H)      Bicuspid aortic valve      Bilateral hand pain 6/1/2021     CAD (coronary artery disease)      Congestive heart failure, unspecified      Coronary artery disease involving native artery of transplanted heart without angina pectoris 7/10/2014     Dialysis patient (H)     Tues-Thur-Sat     Dyslipidemia      Esophageal reflux      ESRD (end stage renal disease) (H)      Hearing problem      Heart replaced by transplant (H) 12/10/2018     Hypersomnia with sleep apnea, unspecified      Hypertension      Ileostomy status (H)      Immunosuppression (H)      MGUS (monoclonal gammopathy of unknown significance)      Mitral regurgitation      SHEELA (obstructive sleep apnea)     No CPAP     Pneumonia      Sigmoid diverticulitis 8/14/2018     Status post coronary angiogram 6/28/2019     Systolic heart failure (H)      Type 2  diabetes mellitus (H)        Past Surgical History:   Procedure Laterality Date     BIOPSY       CARDIAC SURGERY       COLONOSCOPY N/A 5/3/2018    Procedure: COLONOSCOPY;  colonoscopy ;  Surgeon: Ammon Castillo MD;  [facility]     CREATE FISTULA ARTERIOVENOUS UPPER EXTREMITY BOVINE Left 5/8/2019    Procedure: Left Upper Extremity Arteriovenous Bovine Graft Creation;  Surgeon: Calin Cheney MD;  [facility]     CV CORONARY ANGIOGRAM N/A 6/28/2019    Procedure: CV CORONARY ANGIOGRAM;  Surgeon: Montrell Posada MD;  [facility]     CV CORONARY ANGIOGRAM N/A 7/15/2020    Procedure: CV CORONARY ANGIOGRAM;  Surgeon: Marcelo Ramírez MD;  [facility]     CV CORONARY ANGIOGRAM N/A 6/7/2021    Procedure: CV CORONARY ANGIOGRAM;  Surgeon: Gilberto Mccann MD;  [facility]     CV HEART BIOPSY N/A 2/1/2019    Procedure: HBX;  Surgeon: Montrell Posada MD;  [facility]     CV HEART BIOPSY N/A 3/22/2019    Procedure: HBX, RIJV ACCESS;  Surgeon: Jordan Fox MD;  [facility]     CV HEART BIOPSY N/A 6/28/2019    Procedure: CV HEART BIOPSY;  Surgeon: Montrell Posada MD;  [facility]     CV HEART BIOPSY N/A 10/28/2019    Procedure: CV HEART BIOPSY;  Surgeon: Marcelo Ramírez MD;  [facility]     CV HEART BIOPSY N/A 2/6/2020    Procedure: CV HEART BIOPSY;  Surgeon: Montrell Posada MD;  [facility]     CV HEART BIOPSY N/A 7/15/2020    Procedure: CV HEART BIOPSY;  Surgeon: Marcelo Ramírez MD;  [facility]     CV RIGHT HEART CATH MEASUREMENTS RECORDED N/A 6/28/2019    Procedure: CV RIGHT HEART CATH;  Surgeon: Montrell Posada MD;  [facility]     CV RIGHT HEART CATH MEASUREMENTS RECORDED N/A 7/15/2020    Procedure: CV RIGHT HEART CATH;   Surgeon: Marcelo Ramírez MD;  Location:  [facility] CATH LAB     CV RIGHT HEART CATH MEASUREMENTS RECORDED N/A 6/7/2021    Procedure: CV RIGHT HEART CATH;  Surgeon: Gilberto Mccann MD;  Location:  [facility]ATH [facility]     ENDOSCOPIC ULTRASOUND UPPER GASTROINTESTINAL TRACT (GI) N/A 11/8/2021    Procedure: ENDOSCOPIC ULTRASOUND, ESOPHAGOSCOPY / UPPER GASTROINTESTINAL TRACT (GI)  EUS with FNA;  Surgeon: Alon Don MD;  [facility]     ESOPHAGOSCOPY, GASTROSCOPY, DUODENOSCOPY (EGD), COMBINED N/A 5/7/2018    Procedure: COMBINED ENDOSCOPIC ULTRASOUND, ESOPHAGOSCOPY, GASTROSCOPY, DUODENOSCOPY (EGD), FINE NEEDLE ASPIRATE/BIOPSY;  Endoscopic Ultrasound with Fine Needle Aspiration ;  Surgeon: Alon Don MD;  [facility]     EXAM UNDER ANESTHESIA RECTUM N/A 8/12/2018    Procedure: EXAM UNDER ANESTHESIA RECTUM;  EXAM UNDER ANESTHESIA RECTUM ,COMBINED INCISION AND DRAINAGE OF RECTAL ABCESS ;  Surgeon: Rick Tran MD;  [facility]     INCISION AND DRAINAGE RECTUM, COMBINED N/A 8/12/2018    Procedure: COMBINED INCISION AND DRAINAGE RECTUM;;  Surgeon: Rick Tran MD;  [facility]     IR DIALYSIS FISTULOGRAM LEFT  9/25/2019     IR DIALYSIS FISTULOGRAM LEFT  11/22/2019     IR DIALYSIS PTA  9/25/2019     IR DIALYSIS PTA  11/22/2019     LAPAROSCOPIC ASSISTED COLOSTOMY TAKEDOWN N/A 12/11/2018    Procedure: Laparoscopic Assisted Colostomy Takedown, Laparoscopic Lysis of Adhesions;  Surgeon: Rick Tran MD;  [facility]     LAPAROSCOPIC ASSISTED SIGMOID COLECTOMY N/A 8/14/2018    Procedure: LAPAROSCOPIC ASSISTED SIGMOID COLECTOMY;  Laparoscopic Hand Assisted Takedown of Splenic Flexure, Sigmoidectomy, Small Bowel Resection, Takedown of Small Bowel to Colon Fistula;  Surgeon: Rick Tran MD;  [facility]     LAPAROSCOPIC HERNIORRHAPHY INGUINAL BILATERAL Bilateral 7/24/2015    Procedure: LAPAROSCOPIC HERNIORRHAPHY  "INGUINAL BILATERAL;  Surgeon: Bobby Mcconnell MD;  [facility]     LAPAROSCOPIC INSERTION CATHETER PERITONEAL DIALYSIS N/A 6/22/2017    Procedure: LAPAROSCOPIC INSERTION CATHETER PERITONEAL DIALYSIS;  Laparoscopic Peritoneal Dialysis Catheter Placement - Anesthesia with block;  Surgeon: Esteban Arvizu MD;  [facility]     PICC INSERTION Left 04/22/2018    5Fr - 49cm (3cm external), Basilic vein, low SVC     REMOVE CATHETER PERITONEAL Right 1/15/2018    Procedure: REMOVE CATHETER PERITONEAL;  Open Removal of Peritoneal Dialysis Catheter ;  Surgeon: Esteban Arvizu MD;  [facility]     SIGMOIDOSCOPY FLEXIBLE N/A 11/21/2018    Procedure: Examination Under Anesthesia, Flexible Sigmoidoscopy and Polypectomy;  Surgeon: Rick Tran MD;  [facility]     SIGMOIDOSCOPY FLEXIBLE N/A 12/11/2018    Procedure: Flexible Sigmoidoscopy;  Surgeon: Rick Tran MD;  [facility]     TAKEDOWN ILEOSTOMY N/A 3/27/2019    Procedure: Takedown Ileostomy;  Surgeon: Rick Tran MD;  [facility]     TRANSPLANT HEART RECIPIENT N/A 6/14/2018    Procedure: TRANSPLANT HEART RECIPIENT;  Median Sternotomy, on-pump oxygenator, Heart Transplant;  Surgeon: Rony Caputo MD;  [facility]     TRANSPLANT KIDNEY RECIPIENT LIVING UNRELATED  12/2019       ROS: 10 point review of systems negative except noted in HPI    PHYSICAL EXAM  General appearance- animated, alert, and in no distress.  Neck- Neck is supple without obvious adenopathy.  Lungs- Respiratory effort unlabored.  Gait- Normal.  Abdomen - soft non distended, non tender with multiple well healed scars and bulge at lower aspect of abdomen.    Impression: ventral hernia in higher risk patient well compensated and active at work. Would benefit from elective repair in spite of higher risks.  Recommend:  CT abdomen and pelvis for operative planning.  I will call with results and discuss surgical options.    \"Total " time = 45 minutes, spent on the date of encounter doing chart review, history and physical, documentation, patient education, and any further activity as noted above.     Again, thank you for allowing me to participate in the care of your patient.      Sincerely,    Shaheed Curtis MD

## 2021-12-07 NOTE — PATIENT INSTRUCTIONS
You met with Dr. Shaheed Curtis.      Today's visit instructions:    Dr. Curtis has ordered a CT for you. We will call you with those results and recommendations.        If you have questions please contact Kristi RN or Melinda RN during regular clinic hours, Monday through Friday 7:30 AM - 4:00 PM, or you can contact us via [employer] at anytime.       If you have urgent needs after-hours, weekends, or holidays please call the hospital at [phone] and ask to speak with our on-call General Surgery Team.    Appointment [phone]  Nurse Advice (Kristi or Melinda): [phone]   Surgery Scheduler (Tigre): [phone]  Fax: [phone]

## 2021-12-08 LAB — BKV DNA # SPEC NAA+PROBE: NOT DETECTED COPIES/ML

## 2021-12-10 ENCOUNTER — ANCILLARY PROCEDURE (OUTPATIENT)
Dept: CT IMAGING | Facility: CLINIC | Age: 66
End: 2021-12-10
Attending: SURGERY
Payer: MEDICARE

## 2021-12-10 DIAGNOSIS — K43.9 VENTRAL HERNIA WITHOUT OBSTRUCTION OR GANGRENE: ICD-10-CM

## 2021-12-10 PROCEDURE — G1004 CDSM NDSC: HCPCS | Mod: GC | Performed by: RADIOLOGY

## 2021-12-10 PROCEDURE — 74177 CT ABD & PELVIS W/CONTRAST: CPT | Mod: MG | Performed by: RADIOLOGY

## 2021-12-10 RX ORDER — IOPAMIDOL 755 MG/ML
109 INJECTION, SOLUTION INTRAVASCULAR ONCE
Status: COMPLETED | OUTPATIENT
Start: 2021-12-10 | End: 2021-12-10

## 2021-12-10 RX ADMIN — IOPAMIDOL 109 ML: 755 INJECTION, SOLUTION INTRAVASCULAR at 16:16

## 2021-12-13 ENCOUNTER — PATIENT OUTREACH (OUTPATIENT)
Dept: SURGERY | Facility: CLINIC | Age: 66
End: 2021-12-13
Payer: MEDICARE

## 2021-12-13 NOTE — PROGRESS NOTES
Dr. Curtis called patient and left a message to return call to go over CT results and recommendations. Patient called back and states he would appreciate a return phone call tomorrow between [phone].    Dr. Curtis called and spoke with patient regarding surgery. Plan is for open ventral hernia repair with mesh under general anesthesia. To be done at the [facility] ([facility] Patient will need a pre-op physical with PAC. Message sent to patient via [employer] with instructions regarding this surgery.

## 2021-12-14 ENCOUNTER — VIRTUAL VISIT (OUTPATIENT)
Dept: NEPHROLOGY | Facility: CLINIC | Age: 66
End: 2021-12-14
Attending: INTERNAL MEDICINE
Payer: MEDICARE

## 2021-12-14 VITALS
HEART RATE: 86 BPM | WEIGHT: 178 LBS | SYSTOLIC BLOOD PRESSURE: 126 MMHG | DIASTOLIC BLOOD PRESSURE: 87 MMHG | BODY MASS INDEX: 27.47 KG/M2

## 2021-12-14 DIAGNOSIS — D63.1 ANEMIA IN STAGE 2 CHRONIC KIDNEY DISEASE: ICD-10-CM

## 2021-12-14 DIAGNOSIS — Z48.298 AFTERCARE FOLLOWING ORGAN TRANSPLANT: ICD-10-CM

## 2021-12-14 DIAGNOSIS — N18.2 ANEMIA IN STAGE 2 CHRONIC KIDNEY DISEASE: ICD-10-CM

## 2021-12-14 DIAGNOSIS — I15.1 HTN, KIDNEY TRANSPLANT RELATED: ICD-10-CM

## 2021-12-14 DIAGNOSIS — Z94.0 KIDNEY REPLACED BY TRANSPLANT: Primary | ICD-10-CM

## 2021-12-14 DIAGNOSIS — Z29.89 NEED FOR PNEUMOCYSTIS PROPHYLAXIS: ICD-10-CM

## 2021-12-14 DIAGNOSIS — E55.9 VITAMIN D DEFICIENCY: ICD-10-CM

## 2021-12-14 DIAGNOSIS — Z94.0 HTN, KIDNEY TRANSPLANT RELATED: ICD-10-CM

## 2021-12-14 DIAGNOSIS — Z94.1 HEART REPLACED BY TRANSPLANT (H): ICD-10-CM

## 2021-12-14 DIAGNOSIS — D84.9 IMMUNOSUPPRESSION (H): ICD-10-CM

## 2021-12-14 DIAGNOSIS — D47.2 MGUS (MONOCLONAL GAMMOPATHY OF UNKNOWN SIGNIFICANCE): ICD-10-CM

## 2021-12-14 PROCEDURE — G0463 HOSPITAL OUTPT CLINIC VISIT: HCPCS | Mod: PN,RTG | Performed by: INTERNAL MEDICINE

## 2021-12-14 PROCEDURE — 99214 OFFICE O/P EST MOD 30 MIN: CPT | Mod: 95 | Performed by: INTERNAL MEDICINE

## 2021-12-14 NOTE — PROGRESS NOTES
Murray is a 66 year old who is being evaluated via a billable video visit.      How would you like to obtain your AVS? MyChart  If the video visit is dropped, the invitation should be resent by: Send to e-mail at: [email or web address]  Will anyone else be joining your video visit? No    Video Start Time: 1333  Video-Visit Details    Type of service:  Video Visit    Video End Time:1350    Originating Location (pt. Location): Home    Distant Location (provider location):  [facility] [place]     Platform used for Video Visit: Well       TRANSPLANT NEPHROLOGY CHRONIC POST TRANSPLANT VISIT    Assessment & Plan   # LDKT: Stable   - Baseline Creatinine:  ~ 1.0-1.2   - Proteinuria: Normal (<0.2 grams)   - Date DSA Last Checked: Jul/2021      Latest DSA: No   - BK Viremia: No   - Kidney Tx Biopsy: No    # Heart Tx: Patient appears to be stable.  Followed by Cardiology.    # Immunosuppression: Tacrolimus immediate release (goal 6-8) and Mycophenolate mofetil (dose 750 mg every 12 hours)   - Continue with intensive monitoring of immunosuppression for efficacy and toxicity.   - Changes: No    # Infection Prophylaxis:   - PJP: Sulfa/TMP (Bactrim)    # Hypertension: Controlled;  Goal BP: < 130/80   - Changes: No    # Diabetes: Controlled (HbA1c <7%) Last HbA1c: 6.2%   - Management as per primary care.    # Anemia in Chronic Renal Disease: Hgb: Stable, near normal      ANASTASIYA: No   - Iron studies: Not checked recently    # Mineral Bone Disorder:   - Vitamin D; level: Normal        On supplement: Yes  - Calcium; level: High normal        On supplement: Yes; If calcium level increases further, would decrease oral calcium supplement.    # PAD: No claudication symptoms.    # MGUS: Stable monoclonal IgG immunoglobulin of kappa light chain type with peak of ~ 0.4 with last check 11/2020.   - Will recheck serum kappa/lambda light chains and SPEP.    # GERD: Controlled on famotidine.    # Ventral Hernia:  Patient is supposed to have surgical repair done in the near future.    # Pancreatic Cyst: Likely side branch IPMN with negative biopsy and recommended follow up MRI in 1 year.  Followed by GI.    # Skin Cancer Risk:    - Discussed sun protection and recommend regular follow up with Dermatology.    # Medical Compliance: Yes    # COVID-19 Virus Review: Discussed COVID-19 virus and the potential medical risks.  Reviewed preventative health recommendations, including wearing a mask where appropriate.  Recommended COVID vaccination should be up to date with either an initial vaccination or booster shot when appropriate.  Asked the patient to inform the transplant center if they are exposed or diagnosed with this virus.    # COVID Vaccination Up To Date: Yes    # Transplant History:  Etiology of Kidney Failure: Diabetes mellitus type 2  Tx: LDKT and Heart Tx  Transplant: 12/19/2019 (Kidney), 6/14/2018 (Heart)  Significant changes in immunosuppression: None  Significant transplant-related complications: None    Transplant Office Phone Number: [phone]    Assessment and plan was discussed with the patient and he voiced his understanding and agreement.    Return visit: Return in about 6 months (around 6/14/2022).    Giovany Quijano MD    Chief Complaint   Mr. Nicholson is a 67 year old here for kidney transplant and immunosuppression management.    History of Present Illness    Mr. Nicholson reports feeling good overall with some medical complaints.  Since last clinic visit, patient reports no hospitalizations, but some new medical complaints and has been doing well overall.  He has a ventral hernia that was bothering him and he was seen by surgery with plans for surgical repair in the near future.  His symptoms are controlled when he uses the abdominal binder.  Patient also reports recent left hearing loss.  He was evaluated for this and diagnosed with sensorineural hearing loss.  Patient underwent head MRI, which  "was unremarkable.  He is following with Audiology.    His energy level is \"great\" and pretty normal.  Patient is active and does get a little exercise.  He is working part time at [employer]  Denies any chest pain or shortness of breath with exertion.  No claudication symptoms.  No leg swelling.    Appetite is good and with weight is stable.  No nausea, vomiting or diarrhea.  No heartburn symptoms on PPI.  No fever, sweats or chills.    Home BP: 110-130/80s    Problem List   Patient Active Problem List   Diagnosis     Esophageal reflux     Allergic rhinitis     Anemia in chronic renal disease     Dyslipidemia     MGUS (monoclonal gammopathy of unknown significance)     Ascending aortic aneurysm (H)     Heart replaced by transplant (H)     Immunosuppression (H)     Type 2 diabetes mellitus (H)     Pancreatic cyst     Kidney replaced by transplant     Aftercare following organ transplant     HTN, kidney transplant related     Secondary renal hyperparathyroidism (H)     Vitamin D deficiency     SHEELA on CPAP     Need for pneumocystis prophylaxis     Erectile dysfunction, unspecified erectile dysfunction type     Nocturia     History of hernia repair     Decreased hearing, unspecified laterality       Allergies   Allergies   Allergen Reactions     Norco [Hydrocodone-Acetaminophen] Nausea and Vomiting     Cats      Throat tightness     Isosorbide Other (See Comments)     hypotension     Penicillins Hives     Seasonal Allergies      rhinitis     Shrimp      Throat closes        Medications   Current Outpatient Medications   Medication Sig     amLODIPine (NORVASC) 2.5 MG tablet Take 2 tablets (5 mg) by mouth daily     aspirin 81 MG EC tablet Take 81 mg by mouth daily     atorvastatin (LIPITOR) 40 MG tablet TAKE 1 TABLET(40 MG) BY MOUTH DAILY     biotin 1000 MCG TABS tablet Take 1,000 mcg by mouth daily Patient takes every other day     blood glucose (ACCU-CHEK GUIDE) test strip Use to test blood sugar 3 times daily or as " directed.     blood glucose monitoring (ACCU-CHEK FASTCLIX) lancets USE TO TEST 3 TIMES DAILY OR AS DIRECTED.     calcium citrate and vitamin D (CITRACAL) 200-250 MG-UNIT TABS per tablet Take 1 tablet by mouth 2 times daily     famotidine (PEPCID) 20 MG tablet Take 1 tablet (20 mg) by mouth 2 times daily     fluticasone (FLONASE) 50 MCG/ACT nasal spray SHAKE LIQUID AND USE 1 SPRAY IN EACH NOSTRIL DAILY     loratadine (CLARITIN) 10 MG tablet Take 10 mg by mouth daily Patient takes at bedtime     metFORMIN (GLUCOPHAGE-XR) 500 MG 24 hr tablet Take 4 tablets (2,000 mg) by mouth daily (with breakfast)     multivitamin (CENTRUM SILVER) tablet Take 1 tablet by mouth daily     mycophenolate (GENERIC EQUIVALENT) 250 MG capsule Take 3 capsules (750 mg) by mouth 2 times daily     sulfamethoxazole-trimethoprim (BACTRIM) 400-80 MG tablet Take 1 tablet by mouth daily     tacrolimus (GENERIC EQUIVALENT) 0.5 MG capsule Take ONE cap every PM (0.5 mg every evening)     tacrolimus (GENERIC EQUIVALENT) 1 MG capsule TAKE 1 CAPSULE BY MOUTH EVERY MORNING     Zinc Sulfate (ZINC 15 PO)      No current facility-administered medications for this visit.     Facility-Administered Medications Ordered in Other Visits   Medication     diatrizoate meglumine-sodium (GASTROGRAFIN/GASTROVIEW) 66-10 % solution 480 mL     There are no discontinued medications.    Physical Exam   Vital Signs: /87   Pulse 86   Wt 80.7 kg (178 lb)   BMI 27.47 kg/m      GENERAL APPEARANCE: alert and no distress  HENT: no obvious abnormalities on appearance  RESP: breathing appears unremarkable with normal rate, no audible wheezing or cough and no apparent shortness of breath with conversation  MS: extremities normal - no gross deformities noted  SKIN: no apparent rash and normal skin tone  NEURO: speech is clear with no obvious neurological deficits  PSYCH: mentation appears normal and affect normal    Data     Renal Latest Ref Rng & Units 12/7/2021 11/16/2021  10/12/2021   Na 133 - 144 mmol/L 139 136 139   K 3.4 - 5.3 mmol/L 4.0 4.0 3.7   Cl 94 - 109 mmol/L 104 108 104   CO2 20 - 32 mmol/L 26 24 27   BUN 7 - 30 mg/dL 18 19 17   Cr 0.66 - 1.25 mg/dL 1.06 0.93 1.03   Glucose 70 - 99 mg/dL 188(H) 170(H) 168(H)   Ca  8.5 - 10.1 mg/dL 10.0 9.6 9.2   Mg 1.6 - 2.3 mg/dL - - 1.4(L)     Bone Health Latest Ref Rng & Units 10/12/2021 9/7/2021 6/7/2021   Phos 2.5 - 4.5 mg/dL 2.7 - 3.1   PTHi 18 - 80 pg/mL - - -   Vit D Def 20 - 75 ug/L - 38 -     Heme Latest Ref Rng & Units 12/7/2021 11/16/2021 10/12/2021   WBC 4.0 - 11.0 10e3/uL 6.1 5.2 4.5   Hgb 13.3 - 17.7 g/dL 13.4 13.3 12.9(L)   Plt 150 - 450 10e3/uL 225 201 196   ABSOLUTE NEUTROPHIL 1.6 - 8.3 10e9/L - - -   ABSOLUTE LYMPHOCYTES 0.8 - 5.3 10e9/L - - -   ABSOLUTE MONOCYTES 0.0 - 1.3 10e9/L - - -   ABSOLUTE EOSINOPHILS 0.0 - 0.7 10e9/L - - -   ABSOLUTE BASOPHILS 0.0 - 0.2 10e9/L - - -   ABS IMMATURE GRANULOCYTES 0 - 0.4 10e9/L - - -   ABSOLUTE NUCLEATED RBC - - - -     Liver Latest Ref Rng & Units 6/7/2021 12/9/2020 7/15/2020   AP 40 - 150 U/L 101 136 157(H)   TBili 0.2 - 1.3 mg/dL 1.6(H) 1.1 1.0   DBili 0.0 - 0.2 mg/dL - - -   ALT 0 - 70 U/L 28 36 34   AST 0 - 45 U/L 27 22 20   Tot Protein 6.8 - 8.8 g/dL 7.3 7.3 7.5   Albumin 3.4 - 5.0 g/dL 3.8 3.8 3.9     Pancreas Latest Ref Rng & Units 6/7/2021 9/14/2020 7/15/2020   A1C 0 - 5.6 % 6.2(H) 6.1(H) 6.3(H)   Amylase 30 - 110 U/L - - -     Iron studies Latest Ref Rng & Units 12/10/2019 6/10/2019 7/10/2018   Iron 35 - 180 ug/dL 84 118 66   Iron sat 15 - 46 % 35 47(H) 28   Ferritin 26 - 388 ng/mL 445(H) 644(H) 771(H)     [facility] Txp Virology Latest Ref Rng & Units 7/6/2021 6/10/2021 6/7/2021   CVM DNA Quant - - - Plasma   CMV QUANT IU/ML CMVND:CMV DNA Not Detected [IU]/mL - - CMV DNA Not Detected   LOG IU/ML OF CMVQNT <2.1 [Log:IU]/mL - - Not Calculated   BK Spec - Plasma Plasma -   BK Res BKNEG:BK Virus DNA Not Detected copies/mL BK Virus DNA Not Detected BK Virus DNA Not Detected -   BK  Log <2.7 Log copies/mL Not Calculated Not Calculated -   EBV CAPSID ANTIBODY IGG 0.0 - 0.8 AI - - -   EBV DNA COPIES/ML EBVNEG:EBV DNA Not Detected [Copies]/mL - - EBV DNA Not Detected   EBV DNA LOG OF COPIES <2.7 [Log:copies]/mL - - Not Calculated   Hep B Core NR:Nonreactive - - -        Recent Labs   Lab Test 10/12/21  0942 11/16/21  0939 12/07/21  0958   DOSTAC 10/11/2021 11/15/2021 12/6/2021   TACROL 5.9 5.8 7.4     Recent Labs   Lab Test 12/26/19  0720 01/27/20  0918 02/03/20  0948   DOSMPA Not Provided NTP 02/02/20 0915pm   MPACID 1.65 2.39 2.41   MPAG 58.9 54.6 50.4

## 2021-12-14 NOTE — LETTER
12/14/2021    RE: Murray Nicholson  [address]  [place] [address]     Murray is a 66 year old who is being evaluated via a billable video visit.      How would you like to obtain your AVS? MyChart  If the video visit is dropped, the invitation should be resent by: Send to e-mail at: [email or web address]  Will anyone else be joining your video visit? No    Video Start Time: 1333  Video-Visit Details    Type of service:  Video Visit    Video End Time:1350    Originating Location (pt. Location): Home    Distant Location (provider location):  [facility] [place]     Platform used for Video Visit: [facility]       TRANSPLANT NEPHROLOGY CHRONIC POST TRANSPLANT VISIT    Assessment & Plan   # LDKT: Stable   - Baseline Creatinine:  ~ 1.0-1.2   - Proteinuria: Normal (<0.2 grams)   - Date DSA Last Checked: Jul/2021      Latest DSA: No   - BK Viremia: No   - Kidney Tx Biopsy: No    # Heart Tx: Patient appears to be stable.  Followed by Cardiology.    # Immunosuppression: Tacrolimus immediate release (goal 6-8) and Mycophenolate mofetil (dose 750 mg every 12 hours)   - Continue with intensive monitoring of immunosuppression for efficacy and toxicity.   - Changes: No    # Infection Prophylaxis:   - PJP: Sulfa/TMP (Bactrim)    # Hypertension: Controlled;  Goal BP: < 130/80   - Changes: No    # Diabetes: Controlled (HbA1c <7%) Last HbA1c: 6.2%   - Management as per primary care.    # Anemia in Chronic Renal Disease: Hgb: Stable, near normal      ANASTASIYA: No   - Iron studies: Not checked recently    # Mineral Bone Disorder:   - Vitamin D; level: Normal        On supplement: Yes  - Calcium; level: High normal        On supplement: Yes; If calcium level increases further, would decrease oral calcium supplement.    # PAD: No claudication symptoms.    # MGUS: Stable monoclonal IgG immunoglobulin of kappa light chain type with peak of ~ 0.4 with last check 11/2020.   - Will recheck serum kappa/lambda  light chains and SPEP.    # GERD: Controlled on famotidine.    # Ventral Hernia: Patient is supposed to have surgical repair done in the near future.    # Pancreatic Cyst: Likely side branch IPMN with negative biopsy and recommended follow up MRI in 1 year.  Followed by GI.    # Skin Cancer Risk:    - Discussed sun protection and recommend regular follow up with Dermatology.    # Medical Compliance: Yes    # COVID-19 Virus Review: Discussed COVID-19 virus and the potential medical risks.  Reviewed preventative health recommendations, including wearing a mask where appropriate.  Recommended COVID vaccination should be up to date with either an initial vaccination or booster shot when appropriate.  Asked the patient to inform the transplant center if they are exposed or diagnosed with this virus.    # COVID Vaccination Up To Date: Yes    # Transplant History:  Etiology of Kidney Failure: Diabetes mellitus type 2  Tx: LDKT and Heart Tx  Transplant: 12/19/2019 (Kidney), 6/14/2018 (Heart)  Significant changes in immunosuppression: None  Significant transplant-related complications: None    Transplant Office Phone Number: [phone]    Assessment and plan was discussed with the patient and he voiced his understanding and agreement.    Return visit: Return in about 6 months (around 6/14/2022).    Giovany Quijano MD    Chief Complaint   Mr. Nicholson is a 67 year old here for kidney transplant and immunosuppression management.    History of Present Illness    Mr. Nicholson reports feeling good overall with some medical complaints.  Since last clinic visit, patient reports no hospitalizations, but some new medical complaints and has been doing well overall.  He has a ventral hernia that was bothering him and he was seen by surgery with plans for surgical repair in the near future.  His symptoms are controlled when he uses the abdominal binder.  Patient also reports recent left hearing loss.  He was evaluated for this and  "diagnosed with sensorineural hearing loss.  Patient underwent head MRI, which was unremarkable.  He is following with Audiology.    His energy level is \"great\" and pretty normal.  Patient is active and does get a little exercise.  He is working part time at [employer]  Denies any chest pain or shortness of breath with exertion.  No claudication symptoms.  No leg swelling.    Appetite is good and with weight is stable.  No nausea, vomiting or diarrhea.  No heartburn symptoms on PPI.  No fever, sweats or chills.    Home BP: 110-130/80s    Problem List   Patient Active Problem List   Diagnosis     Esophageal reflux     Allergic rhinitis     Anemia in chronic renal disease     Dyslipidemia     MGUS (monoclonal gammopathy of unknown significance)     Ascending aortic aneurysm (H)     Heart replaced by transplant (H)     Immunosuppression (H)     Type 2 diabetes mellitus (H)     Pancreatic cyst     Kidney replaced by transplant     Aftercare following organ transplant     HTN, kidney transplant related     Secondary renal hyperparathyroidism (H)     Vitamin D deficiency     SHEELA on CPAP     Need for pneumocystis prophylaxis     Erectile dysfunction, unspecified erectile dysfunction type     Nocturia     History of hernia repair     Decreased hearing, unspecified laterality       Allergies   Allergies   Allergen Reactions     Norco [Hydrocodone-Acetaminophen] Nausea and Vomiting     Cats      Throat tightness     Isosorbide Other (See Comments)     hypotension     Penicillins Hives     Seasonal Allergies      rhinitis     Shrimp      Throat closes        Medications   Current Outpatient Medications   Medication Sig     amLODIPine (NORVASC) 2.5 MG tablet Take 2 tablets (5 mg) by mouth daily     aspirin 81 MG EC tablet Take 81 mg by mouth daily     atorvastatin (LIPITOR) 40 MG tablet TAKE 1 TABLET(40 MG) BY MOUTH DAILY     biotin 1000 MCG TABS tablet Take 1,000 mcg by mouth daily Patient takes every other day     blood glucose " (ACCU-CHEK GUIDE) test strip Use to test blood sugar 3 times daily or as directed.     blood glucose monitoring (ACCU-CHEK FASTCLIX) lancets USE TO TEST 3 TIMES DAILY OR AS DIRECTED.     calcium citrate and vitamin D (CITRACAL) 200-250 MG-UNIT TABS per tablet Take 1 tablet by mouth 2 times daily     famotidine (PEPCID) 20 MG tablet Take 1 tablet (20 mg) by mouth 2 times daily     fluticasone (FLONASE) 50 MCG/ACT nasal spray SHAKE LIQUID AND USE 1 SPRAY IN EACH NOSTRIL DAILY     loratadine (CLARITIN) 10 MG tablet Take 10 mg by mouth daily Patient takes at bedtime     metFORMIN (GLUCOPHAGE-XR) 500 MG 24 hr tablet Take 4 tablets (2,000 mg) by mouth daily (with breakfast)     multivitamin (CENTRUM SILVER) tablet Take 1 tablet by mouth daily     mycophenolate (GENERIC EQUIVALENT) 250 MG capsule Take 3 capsules (750 mg) by mouth 2 times daily     sulfamethoxazole-trimethoprim (BACTRIM) 400-80 MG tablet Take 1 tablet by mouth daily     tacrolimus (GENERIC EQUIVALENT) 0.5 MG capsule Take ONE cap every PM (0.5 mg every evening)     tacrolimus (GENERIC EQUIVALENT) 1 MG capsule TAKE 1 CAPSULE BY MOUTH EVERY MORNING     Zinc Sulfate (ZINC 15 PO)      No current facility-administered medications for this visit.     Facility-Administered Medications Ordered in Other Visits   Medication     diatrizoate meglumine-sodium (GASTROGRAFIN/GASTROVIEW) 66-10 % solution 480 mL     There are no discontinued medications.    Physical Exam   Vital Signs: /87   Pulse 86   Wt 80.7 kg (178 lb)   BMI 27.47 kg/m      GENERAL APPEARANCE: alert and no distress  HENT: no obvious abnormalities on appearance  RESP: breathing appears unremarkable with normal rate, no audible wheezing or cough and no apparent shortness of breath with conversation  MS: extremities normal - no gross deformities noted  SKIN: no apparent rash and normal skin tone  NEURO: speech is clear with no obvious neurological deficits  PSYCH: mentation appears normal and affect  normal    Data     Renal Latest Ref Rng & Units 12/7/2021 11/16/2021 10/12/2021   Na 133 - 144 mmol/L 139 136 139   K 3.4 - 5.3 mmol/L 4.0 4.0 3.7   Cl 94 - 109 mmol/L 104 108 104   CO2 20 - 32 mmol/L 26 24 27   BUN 7 - 30 mg/dL 18 19 17   Cr 0.66 - 1.25 mg/dL 1.06 0.93 1.03   Glucose 70 - 99 mg/dL 188(H) 170(H) 168(H)   Ca  8.5 - 10.1 mg/dL 10.0 9.6 9.2   Mg 1.6 - 2.3 mg/dL - - 1.4(L)     Bone Health Latest Ref Rng & Units 10/12/2021 9/7/2021 6/7/2021   Phos 2.5 - 4.5 mg/dL 2.7 - 3.1   PTHi 18 - 80 pg/mL - - -   Vit D Def 20 - 75 ug/L - 38 -     Heme Latest Ref Rng & Units 12/7/2021 11/16/2021 10/12/2021   WBC 4.0 - 11.0 10e3/uL 6.1 5.2 4.5   Hgb 13.3 - 17.7 g/dL 13.4 13.3 12.9(L)   Plt 150 - 450 10e3/uL 225 201 196   ABSOLUTE NEUTROPHIL 1.6 - 8.3 10e9/L - - -   ABSOLUTE LYMPHOCYTES 0.8 - 5.3 10e9/L - - -   ABSOLUTE MONOCYTES 0.0 - 1.3 10e9/L - - -   ABSOLUTE EOSINOPHILS 0.0 - 0.7 10e9/L - - -   ABSOLUTE BASOPHILS 0.0 - 0.2 10e9/L - - -   ABS IMMATURE GRANULOCYTES 0 - 0.4 10e9/L - - -   ABSOLUTE NUCLEATED RBC - - - -     Liver Latest Ref Rng & Units 6/7/2021 12/9/2020 7/15/2020   AP 40 - 150 U/L 101 136 157(H)   TBili 0.2 - 1.3 mg/dL 1.6(H) 1.1 1.0   DBili 0.0 - 0.2 mg/dL - - -   ALT 0 - 70 U/L 28 36 34   AST 0 - 45 U/L 27 22 20   Tot Protein 6.8 - 8.8 g/dL 7.3 7.3 7.5   Albumin 3.4 - 5.0 g/dL 3.8 3.8 3.9     Pancreas Latest Ref Rng & Units 6/7/2021 9/14/2020 7/15/2020   A1C 0 - 5.6 % 6.2(H) 6.1(H) 6.3(H)   Amylase 30 - 110 U/L - - -     Iron studies Latest Ref Rng & Units 12/10/2019 6/10/2019 7/10/2018   Iron 35 - 180 ug/dL 84 118 66   Iron sat 15 - 46 % 35 47(H) 28   Ferritin 26 - 388 ng/mL 445(H) 644(H) 771(H)     [facility] Txp Virology Latest Ref Rng & Units 7/6/2021 6/10/2021 6/7/2021   CVM DNA Quant - - - Plasma   CMV QUANT IU/ML CMVND:CMV DNA Not Detected [IU]/mL - - CMV DNA Not Detected   LOG IU/ML OF CMVQNT <2.1 [Log:IU]/mL - - Not Calculated   BK Spec - Plasma Plasma -   BK Res BKNEG:BK Virus DNA Not Detected  copies/mL BK Virus DNA Not Detected BK Virus DNA Not Detected -   BK Log <2.7 Log copies/mL Not Calculated Not Calculated -   EBV CAPSID ANTIBODY IGG 0.0 - 0.8 AI - - -   EBV DNA COPIES/ML EBVNEG:EBV DNA Not Detected [Copies]/mL - - EBV DNA Not Detected   EBV DNA LOG OF COPIES <2.7 [Log:copies]/mL - - Not Calculated   Hep B Core NR:Nonreactive - - -        Recent Labs   Lab Test 10/12/21  0942 11/16/21  0939 12/07/21  0958   DOSTAC 10/11/2021 11/15/2021 12/6/2021   TACROL 5.9 5.8 7.4     Recent Labs   Lab Test 12/26/19  0720 01/27/20  0918 02/03/20  0948   DOSMPA Not Provided NTP 02/02/20 0915pm   MPACID 1.65 2.39 2.41   MPAG 58.9 54.6 50.4       Giovany Quijano MD

## 2021-12-14 NOTE — LETTER
12/14/2021       RE: Murray Nicholson  [address]  [place] [address]     Dear Colleague,    Thank you for referring your patient, Murray Nicholson, to the [facility]PH[facility] [place] at [facility]. Please see a copy of my visit note below.    Murray is a 66 year old who is being evaluated via a billable video visit.      How would you like to obtain your AVS? MyChart  If the video visit is dropped, the invitation should be resent by: Send to e-mail at: [email or web address]  Will anyone else be joining your video visit? No    Video Start Time: 1333  Video-Visit Details    Type of service:  Video Visit    Video End Time:1350    Originating Location (pt. Location): Home    Distant Location (provider location):  [facility] [place]     Platform used for Video Visit: [facility]       TRANSPLANT NEPHROLOGY CHRONIC POST TRANSPLANT VISIT    Assessment & Plan   # LDKT: Stable   - Baseline Creatinine:  ~ 1.0-1.2   - Proteinuria: Normal (<0.2 grams)   - Date DSA Last Checked: Jul/2021      Latest DSA: No   - BK Viremia: No   - Kidney Tx Biopsy: No    # Heart Tx: Patient appears to be stable.  Followed by Cardiology.    # Immunosuppression: Tacrolimus immediate release (goal 6-8) and Mycophenolate mofetil (dose 750 mg every 12 hours)   - Continue with intensive monitoring of immunosuppression for efficacy and toxicity.   - Changes: No    # Infection Prophylaxis:   - PJP: Sulfa/TMP (Bactrim)    # Hypertension: Controlled;  Goal BP: < 130/80   - Changes: No    # Diabetes: Controlled (HbA1c <7%) Last HbA1c: 6.2%   - Management as per primary care.    # Anemia in Chronic Renal Disease: Hgb: Stable, near normal      ANASTASIYA: No   - Iron studies: Not checked recently    # Mineral Bone Disorder:   - Vitamin D; level: Normal        On supplement: Yes  - Calcium; level: High normal        On supplement: Yes; If calcium level increases  further, would decrease oral calcium supplement.    # PAD: No claudication symptoms.    # MGUS: Stable monoclonal IgG immunoglobulin of kappa light chain type with peak of ~ 0.4 with last check 11/2020.   - Will recheck serum kappa/lambda light chains and SPEP.    # GERD: Controlled on famotidine.    # Ventral Hernia: Patient is supposed to have surgical repair done in the near future.    # Pancreatic Cyst: Likely side branch IPMN with negative biopsy and recommended follow up MRI in 1 year.  Followed by GI.    # Skin Cancer Risk:    - Discussed sun protection and recommend regular follow up with Dermatology.    # Medical Compliance: Yes    # COVID-19 Virus Review: Discussed COVID-19 virus and the potential medical risks.  Reviewed preventative health recommendations, including wearing a mask where appropriate.  Recommended COVID vaccination should be up to date with either an initial vaccination or booster shot when appropriate.  Asked the patient to inform the transplant center if they are exposed or diagnosed with this virus.    # COVID Vaccination Up To Date: Yes    # Transplant History:  Etiology of Kidney Failure: Diabetes mellitus type 2  Tx: LDKT and Heart Tx  Transplant: 12/19/2019 (Kidney), 6/14/2018 (Heart)  Significant changes in immunosuppression: None  Significant transplant-related complications: None    Transplant Office Phone Number: [phone]    Assessment and plan was discussed with the patient and he voiced his understanding and agreement.    Return visit: Return in about 6 months (around 6/14/2022).    Giovany Quijano MD    Chief Complaint   Mr. Nicholson is a 67 year old here for kidney transplant and immunosuppression management.    History of Present Illness    Mr. Nicholson reports feeling good overall with some medical complaints.  Since last clinic visit, patient reports no hospitalizations, but some new medical complaints and has been doing well overall.  He has a ventral hernia that  "was bothering him and he was seen by surgery with plans for surgical repair in the near future.  His symptoms are controlled when he uses the abdominal binder.  Patient also reports recent left hearing loss.  He was evaluated for this and diagnosed with sensorineural hearing loss.  Patient underwent head MRI, which was unremarkable.  He is following with Audiology.    His energy level is \"great\" and pretty normal.  Patient is active and does get a little exercise.  He is working part time at [employer]  Denies any chest pain or shortness of breath with exertion.  No claudication symptoms.  No leg swelling.    Appetite is good and with weight is stable.  No nausea, vomiting or diarrhea.  No heartburn symptoms on PPI.  No fever, sweats or chills.    Home BP: 110-130/80s    Problem List   Patient Active Problem List   Diagnosis     Esophageal reflux     Allergic rhinitis     Anemia in chronic renal disease     Dyslipidemia     MGUS (monoclonal gammopathy of unknown significance)     Ascending aortic aneurysm (H)     Heart replaced by transplant (H)     Immunosuppression (H)     Type 2 diabetes mellitus (H)     Pancreatic cyst     Kidney replaced by transplant     Aftercare following organ transplant     HTN, kidney transplant related     Secondary renal hyperparathyroidism (H)     Vitamin D deficiency     SHEELA on CPAP     Need for pneumocystis prophylaxis     Erectile dysfunction, unspecified erectile dysfunction type     Nocturia     History of hernia repair     Decreased hearing, unspecified laterality       Allergies   Allergies   Allergen Reactions     Norco [Hydrocodone-Acetaminophen] Nausea and Vomiting     Cats      Throat tightness     Isosorbide Other (See Comments)     hypotension     Penicillins Hives     Seasonal Allergies      rhinitis     Shrimp      Throat closes        Medications   Current Outpatient Medications   Medication Sig     amLODIPine (NORVASC) 2.5 MG tablet Take 2 tablets (5 mg) by mouth daily "     aspirin 81 MG EC tablet Take 81 mg by mouth daily     atorvastatin (LIPITOR) 40 MG tablet TAKE 1 TABLET(40 MG) BY MOUTH DAILY     biotin 1000 MCG TABS tablet Take 1,000 mcg by mouth daily Patient takes every other day     blood glucose (ACCU-CHEK GUIDE) test strip Use to test blood sugar 3 times daily or as directed.     blood glucose monitoring (ACCU-CHEK FASTCLIX) lancets USE TO TEST 3 TIMES DAILY OR AS DIRECTED.     calcium citrate and vitamin D (CITRACAL) 200-250 MG-UNIT TABS per tablet Take 1 tablet by mouth 2 times daily     famotidine (PEPCID) 20 MG tablet Take 1 tablet (20 mg) by mouth 2 times daily     fluticasone (FLONASE) 50 MCG/ACT nasal spray SHAKE LIQUID AND USE 1 SPRAY IN EACH NOSTRIL DAILY     loratadine (CLARITIN) 10 MG tablet Take 10 mg by mouth daily Patient takes at bedtime     metFORMIN (GLUCOPHAGE-XR) 500 MG 24 hr tablet Take 4 tablets (2,000 mg) by mouth daily (with breakfast)     multivitamin (CENTRUM SILVER) tablet Take 1 tablet by mouth daily     mycophenolate (GENERIC EQUIVALENT) 250 MG capsule Take 3 capsules (750 mg) by mouth 2 times daily     sulfamethoxazole-trimethoprim (BACTRIM) 400-80 MG tablet Take 1 tablet by mouth daily     tacrolimus (GENERIC EQUIVALENT) 0.5 MG capsule Take ONE cap every PM (0.5 mg every evening)     tacrolimus (GENERIC EQUIVALENT) 1 MG capsule TAKE 1 CAPSULE BY MOUTH EVERY MORNING     Zinc Sulfate (ZINC 15 PO)      No current facility-administered medications for this visit.     Facility-Administered Medications Ordered in Other Visits   Medication     diatrizoate meglumine-sodium (GASTROGRAFIN/GASTROVIEW) 66-10 % solution 480 mL     There are no discontinued medications.    Physical Exam   Vital Signs: /87   Pulse 86   Wt 80.7 kg (178 lb)   BMI 27.47 kg/m      GENERAL APPEARANCE: alert and no distress  HENT: no obvious abnormalities on appearance  RESP: breathing appears unremarkable with normal rate, no audible wheezing or cough and no apparent  shortness of breath with conversation  MS: extremities normal - no gross deformities noted  SKIN: no apparent rash and normal skin tone  NEURO: speech is clear with no obvious neurological deficits  PSYCH: mentation appears normal and affect normal    Data     Renal Latest Ref Rng & Units 12/7/2021 11/16/2021 10/12/2021   Na 133 - 144 mmol/L 139 136 139   K 3.4 - 5.3 mmol/L 4.0 4.0 3.7   Cl 94 - 109 mmol/L 104 108 104   CO2 20 - 32 mmol/L 26 24 27   BUN 7 - 30 mg/dL 18 19 17   Cr 0.66 - 1.25 mg/dL 1.06 0.93 1.03   Glucose 70 - 99 mg/dL 188(H) 170(H) 168(H)   Ca  8.5 - 10.1 mg/dL 10.0 9.6 9.2   Mg 1.6 - 2.3 mg/dL - - 1.4(L)     Bone Health Latest Ref Rng & Units 10/12/2021 9/7/2021 6/7/2021   Phos 2.5 - 4.5 mg/dL 2.7 - 3.1   PTHi 18 - 80 pg/mL - - -   Vit D Def 20 - 75 ug/L - 38 -     Heme Latest Ref Rng & Units 12/7/2021 11/16/2021 10/12/2021   WBC 4.0 - 11.0 10e3/uL 6.1 5.2 4.5   Hgb 13.3 - 17.7 g/dL 13.4 13.3 12.9(L)   Plt 150 - 450 10e3/uL 225 201 196   ABSOLUTE NEUTROPHIL 1.6 - 8.3 10e9/L - - -   ABSOLUTE LYMPHOCYTES 0.8 - 5.3 10e9/L - - -   ABSOLUTE MONOCYTES 0.0 - 1.3 10e9/L - - -   ABSOLUTE EOSINOPHILS 0.0 - 0.7 10e9/L - - -   ABSOLUTE BASOPHILS 0.0 - 0.2 10e9/L - - -   ABS IMMATURE GRANULOCYTES 0 - 0.4 10e9/L - - -   ABSOLUTE NUCLEATED RBC - - - -     Liver Latest Ref Rng & Units 6/7/2021 12/9/2020 7/15/2020   AP 40 - 150 U/L 101 136 157(H)   TBili 0.2 - 1.3 mg/dL 1.6(H) 1.1 1.0   DBili 0.0 - 0.2 mg/dL - - -   ALT 0 - 70 U/L 28 36 34   AST 0 - 45 U/L 27 22 20   Tot Protein 6.8 - 8.8 g/dL 7.3 7.3 7.5   Albumin 3.4 - 5.0 g/dL 3.8 3.8 3.9     Pancreas Latest Ref Rng & Units 6/7/2021 9/14/2020 7/15/2020   A1C 0 - 5.6 % 6.2(H) 6.1(H) 6.3(H)   Amylase 30 - 110 U/L - - -     Iron studies Latest Ref Rng & Units 12/10/2019 6/10/2019 7/10/2018   Iron 35 - 180 ug/dL 84 118 66   Iron sat 15 - 46 % 35 47(H) 28   Ferritin 26 - 388 ng/mL 445(H) 644(H) 771(H)     [facility] Txp Virology Latest Ref Rng & Units 7/6/2021 6/10/2021  6/7/2021   CVM DNA Quant - - - Plasma   CMV QUANT IU/ML CMVND:CMV DNA Not Detected [IU]/mL - - CMV DNA Not Detected   LOG IU/ML OF CMVQNT <2.1 [Log:IU]/mL - - Not Calculated   BK Spec - Plasma Plasma -   BK Res BKNEG:BK Virus DNA Not Detected copies/mL BK Virus DNA Not Detected BK Virus DNA Not Detected -   BK Log <2.7 Log copies/mL Not Calculated Not Calculated -   EBV CAPSID ANTIBODY IGG 0.0 - 0.8 AI - - -   EBV DNA COPIES/ML EBVNEG:EBV DNA Not Detected [Copies]/mL - - EBV DNA Not Detected   EBV DNA LOG OF COPIES <2.7 [Log:copies]/mL - - Not Calculated   Hep B Core NR:Nonreactive - - -        Recent Labs   Lab Test 10/12/21  0942 11/16/21  0939 12/07/21  0958   DOSTAC 10/11/2021 11/15/2021 12/6/2021   TACROL 5.9 5.8 7.4     Recent Labs   Lab Test 12/26/19  0720 01/27/20  0918 02/03/20  0948   DOSMPA Not Provided NTP 02/02/20 0915pm   MPACID 1.65 2.39 2.41   MPAG 58.9 54.6 50.4       Again, thank you for allowing me to participate in the care of your patient.      Sincerely,    Giovany Quijano MD

## 2021-12-17 ENCOUNTER — OFFICE VISIT (OUTPATIENT)
Dept: AUDIOLOGY | Facility: CLINIC | Age: 66
End: 2021-12-17
Payer: MEDICARE

## 2021-12-17 DIAGNOSIS — H90.3 ASYMMETRIC SNHL (SENSORINEURAL HEARING LOSS): ICD-10-CM

## 2021-12-17 PROCEDURE — 92591 PR HEARING AID EXAM BINAURAL: CPT | Mod: GY | Performed by: AUDIOLOGIST

## 2021-12-17 NOTE — PROGRESS NOTES
AUDIOLOGY REPORT    SUBJECTIVE:   Murray Nicholson is a 66 year old male who was seen in Audiology at the [facility] on 12/17/21 to discuss concerns with hearing and functional communication difficulties. The patient has been seen previously in this facility for a hearing evaluation on 10/28/2021 and results revealed hearing within normal limits for the right ear with exception of a mild sensorineural hearing loss 4000 Hz only and profound rising to severe sensorineural hearing loss with 0% speech understanding for the left ear.  The patient was medically evaluated and determined to be cleared to trial hearing amplification by Dr Noel Godwin MD..     OBJECTIVE:  The patient was presented with different options for amplification to help aid in communication. Styles, levels of technology and monaural vs. binaural fitting were discussed.     The hearing aid(s) mutually chosen were:  Binaural: Phonak: Audeo P30-R right, CROS P transmitter left  COLOR: P1  BATTERY SIZE: N/A (rechargeable)  EARMOLD/TIPS:medium open bilaterally  CANAL/ LENGTH: #1 standard bilaterally      ASSESSMENT:   Purchase information and warranty information was reviewed with the patient. The 45 day trial period was explained to the patient. Patient was counseled that exact out of pocket amounts cannot be determined for hearing aid claims being sent to insurance. Any insurance coverage information presented to the patient is an estimate only, and is not a guarantee of payment. Patient has been advised to check with their own insurance.  The chosen hearing amplification was ordered today.     PLAN:   The patient is scheduled to return in 3-4 weeks for a hearing aid fitting and programming. The purchase agreement will be completed on that date. The patient risk factors have been reviewed with the patient and will be presented in writing prior to the sale of the hearing aid per FDA regulation.  The risk factors are also available in the User Instructional Booklet to be presented on the day of the hearing aid fitting. Please contact this clinic with any questions or concerns.        Lourdes Grewal.  Licensed Audiologist  [place] #[id number]

## 2021-12-23 ENCOUNTER — OFFICE VISIT (OUTPATIENT)
Dept: OTOLARYNGOLOGY | Facility: CLINIC | Age: 66
End: 2021-12-23
Payer: MEDICARE

## 2021-12-23 VITALS
DIASTOLIC BLOOD PRESSURE: 75 MMHG | HEIGHT: 67 IN | HEART RATE: 91 BPM | OXYGEN SATURATION: 100 % | BODY MASS INDEX: 27.94 KG/M2 | WEIGHT: 178 LBS | SYSTOLIC BLOOD PRESSURE: 112 MMHG | TEMPERATURE: 98.3 F

## 2021-12-23 DIAGNOSIS — H90.3 SNHL (SENSORY-NEURAL HEARING LOSS), ASYMMETRICAL: Primary | ICD-10-CM

## 2021-12-23 PROCEDURE — 99213 OFFICE O/P EST LOW 20 MIN: CPT | Performed by: OTOLARYNGOLOGY

## 2021-12-23 ASSESSMENT — MIFFLIN-ST. JEOR: SCORE: 1546.03

## 2021-12-23 ASSESSMENT — PAIN SCALES - GENERAL: PAINLEVEL: NO PAIN (0)

## 2021-12-23 NOTE — PROGRESS NOTES
The patient presents with a history of severe sensorineural hearing loss in the right ear that occurred suddenly more than ten years ago. He had difficulty with dizziness at the time of the event, but he denies dizziness issues since that time.      He was referred for an MRI scan of the head to assess for retrocochlear pathology and he will be referred an Audiology consultation for possible CROS hearing amplification.     Her MRI scan demonstrates:    Impression:    1. Normal bilateral seventh and eighth cranial nose without any  abnormal enhancement.  2. Sequela of chronic small vessel ischemic disease in the bilateral  cerebral white matter with chronic infarcts in the right frontal and  left occipital lobes.       His Audiogram and Tympanogram demonstrates a profound sensorineural hearing loss in the left ear with 0% word recognition. His right ear hearing is within the normal range with 100% word recognition. His tympanograms are normal bilaterally.       All other systems were reviewed and they are either negative or they are not directly pertinent to this Otolaryngology examination.      Past Medical History:    Past Medical History:   Diagnosis Date     (HFpEF) heart failure with preserved ejection fraction (H)      Allergic rhinitis, cause unspecified      Anemia of chronic kidney failure      Aortic stenosis 8/13/2008    Overview:  Bicuspid aortic valve     AS (aortic stenosis)      Ascending aortic aneurysm (H)      Bicuspid aortic valve      Bilateral hand pain 6/1/2021     CAD (coronary artery disease)      Congestive heart failure, unspecified      Coronary artery disease involving native artery of transplanted heart without angina pectoris 7/10/2014     Dialysis patient (H)     Tues-Thur-Sat     Dyslipidemia      Esophageal reflux      ESRD (end stage renal disease) (H)      Hearing problem      Heart replaced by transplant (H) 12/10/2018     Hypersomnia with sleep apnea, unspecified      Hypertension       Ileostomy status (H)      Immunosuppression (H)      MGUS (monoclonal gammopathy of unknown significance)      Mitral regurgitation      SHEELA (obstructive sleep apnea)     No CPAP     Pneumonia      Sigmoid diverticulitis 8/14/2018     Status post coronary angiogram 6/28/2019     Systolic heart failure (H)      Type 2 diabetes mellitus (H)        Past Surgical History:    Past Surgical History:   Procedure Laterality Date     BIOPSY       CARDIAC SURGERY       COLONOSCOPY N/A 5/3/2018    Procedure: COLONOSCOPY;  colonoscopy ;  Surgeon: Ammon Castillo MD;  Location: [facility] GI     CREATE FISTULA ARTERIOVENOUS UPPER EXTREMITY BOVINE Left 5/8/2019    Procedure: Left Upper Extremity Arteriovenous Bovine Graft Creation;  Surgeon: Calin Cheney MD;  Location: [facility] OR     CV CORONARY ANGIOGRAM N/A 6/28/2019    Procedure: CV CORONARY ANGIOGRAM;  Surgeon: Montrell Posada MD;  Location: [facility]     CV CORONARY ANGIOGRAM N/A 7/15/2020    Procedure: CV CORONARY ANGIOGRAM;  Surgeon: Marcelo Ramírez MD;  Location: [facility]     CV CORONARY ANGIOGRAM N/A 6/7/2021    Procedure: CV CORONARY ANGIOGRAM;  Surgeon: Gilberto Mccann MD;  [facility]     CV HEART BIOPSY N/A 2/1/2019    Procedure: HBX;  Surgeon: Montrell Posada MD;  [facility]     CV HEART BIOPSY N/A 3/22/2019    Procedure: HBX, RIJV ACCESS;  Surgeon: Jordan Fox MD;  [facility]     CV HEART BIOPSY N/A 6/28/2019    Procedure: CV HEART BIOPSY;  Surgeon: Montrell Posada MD;  [facility]     CV HEART BIOPSY N/A 10/28/2019    Procedure: CV HEART BIOPSY;  Surgeon: Marcelo Ramírez MD;  [facility]     CV HEART BIOPSY N/A 2/6/2020    Procedure: CV HEART BIOPSY;  Surgeon: Montrell Posada MD;  [facility]     CV HEART BIOPSY N/A 7/15/2020    Procedure: CV HEART  BIOPSY;  Surgeon: Marcelo Ramírez MD;  Location:  HEART CARDIAC CATH LAB     CV RIGHT HEART CATH MEASUREMENTS RECORDED N/A 6/28/2019    Procedure: CV RIGHT HEART CATH;  Surgeon: Montrell Posada MD;  Location:  HEART CARDIAC CATH LAB     CV RIGHT HEART CATH MEASUREMENTS RECORDED N/A 7/15/2020    Procedure: CV RIGHT HEART CATH;  Surgeon: Marcelo Ramírez MD;  Location:  HEART CARDIAC CATH LAB     CV RIGHT HEART CATH MEASUREMENTS RECORDED N/A 6/7/2021    Procedure: CV RIGHT HEART CATH;  Surgeon: Gilberto Mccann MD;  Location:  HEART CARDIAC CATH LAB     ENDOSCOPIC ULTRASOUND UPPER GASTROINTESTINAL TRACT (GI) N/A 11/8/2021    Procedure: ENDOSCOPIC ULTRASOUND, ESOPHAGOSCOPY / UPPER GASTROINTESTINAL TRACT (GI)  EUS with FNA;  Surgeon: Alon Don MD;  Location: [facility] OR     ESOPHAGOSCOPY, GASTROSCOPY, DUODENOSCOPY (EGD), COMBINED N/A 5/7/2018    Procedure: COMBINED ENDOSCOPIC ULTRASOUND, ESOPHAGOSCOPY, GASTROSCOPY, DUODENOSCOPY (EGD), FINE NEEDLE ASPIRATE/BIOPSY;  Endoscopic Ultrasound with Fine Needle Aspiration ;  Surgeon: Alon Don MD;  Location: [facility]     EXAM UNDER ANESTHESIA RECTUM N/A 8/12/2018    Procedure: EXAM UNDER ANESTHESIA RECTUM;  EXAM UNDER ANESTHESIA RECTUM ,COMBINED INCISION AND DRAINAGE OF RECTAL ABCESS ;  Surgeon: Rick Tran MD;  Location: [facility]     INCISION AND DRAINAGE RECTUM, COMBINED N/A 8/12/2018    Procedure: COMBINED INCISION AND DRAINAGE RECTUM;;  Surgeon: Rick Tran MD;  [facility]     IR DIALYSIS FISTULOGRAM LEFT  9/25/2019     IR DIALYSIS FISTULOGRAM LEFT  11/22/2019     IR DIALYSIS PTA  9/25/2019     IR DIALYSIS PTA  11/22/2019     LAPAROSCOPIC ASSISTED COLOSTOMY TAKEDOWN N/A 12/11/2018    Procedure: Laparoscopic Assisted Colostomy Takedown, Laparoscopic Lysis of Adhesions;  Surgeon: Rick Tran MD;  [facility]     LAPAROSCOPIC ASSISTED SIGMOID COLECTOMY N/A 8/14/2018    Procedure:  LAPAROSCOPIC ASSISTED SIGMOID COLECTOMY;  Laparoscopic Hand Assisted Takedown of Splenic Flexure, Sigmoidectomy, Small Bowel Resection, Takedown of Small Bowel to Colon Fistula;  Surgeon: Rick Tran MD;  [facility]     LAPAROSCOPIC HERNIORRHAPHY INGUINAL BILATERAL Bilateral 7/24/2015    Procedure: LAPAROSCOPIC HERNIORRHAPHY INGUINAL BILATERAL;  Surgeon: Bobby Mcconnell MD;  [facility]: [facility]     LAPAROSCOPIC INSERTION CATHETER PERITONEAL DIALYSIS N/A 6/22/2017    Procedure: LAPAROSCOPIC INSERTION CATHETER PERITONEAL DIALYSIS;  Laparoscopic Peritoneal Dialysis Catheter Placement - Anesthesia with block;  Surgeon: Esteban Arvizu MD;  Location: [facility]     PICC INSERTION Left 04/22/2018    5Fr - 49cm (3cm external), Basilic vein, low SVC     REMOVE CATHETER PERITONEAL Right 1/15/2018    Procedure: REMOVE CATHETER PERITONEAL;  Open Removal of Peritoneal Dialysis Catheter ;  Surgeon: Esteban Arvizu MD;  [facility]     SIGMOIDOSCOPY FLEXIBLE N/A 11/21/2018    Procedure: Examination Under Anesthesia, Flexible Sigmoidoscopy and Polypectomy;  Surgeon: Rick Tran MD;  [facility]     SIGMOIDOSCOPY FLEXIBLE N/A 12/11/2018    Procedure: Flexible Sigmoidoscopy;  Surgeon: Rick Tran MD;  [facility]     TAKEDOWN ILEOSTOMY N/A 3/27/2019    Procedure: Takedown Ileostomy;  Surgeon: Rick Tran MD;  [facility]     TRANSPLANT HEART RECIPIENT N/A 6/14/2018    Procedure: TRANSPLANT HEART RECIPIENT;  Median Sternotomy, on-pump oxygenator, Heart Transplant;  Surgeon: Rony Caputo MD;  [facility]     TRANSPLANT KIDNEY RECIPIENT LIVING UNRELATED  12/2019       Medications:      Current Outpatient Medications:      amLODIPine (NORVASC) 2.5 MG tablet, Take 2 tablets (5 mg) by mouth daily, Disp: 180 tablet, Rfl: 3     aspirin 81 MG EC tablet, Take 81 mg by mouth daily, Disp: , Rfl:      atorvastatin (LIPITOR) 40 MG tablet, TAKE 1 TABLET(40  MG) BY MOUTH DAILY, Disp: 90 tablet, Rfl: 3     biotin 1000 MCG TABS tablet, Take 1,000 mcg by mouth daily Patient takes every other day, Disp: , Rfl:      blood glucose (ACCU-CHEK GUIDE) test strip, Use to test blood sugar 3 times daily or as directed., Disp: 300 strip, Rfl: 3     blood glucose monitoring (ACCU-CHEK FASTCLIX) lancets, USE TO TEST 3 TIMES DAILY OR AS DIRECTED., Disp: 300 each, Rfl: 3     calcium citrate and vitamin D (CITRACAL) 200-250 MG-UNIT TABS per tablet, Take 1 tablet by mouth 2 times daily, Disp: , Rfl:      famotidine (PEPCID) 20 MG tablet, Take 1 tablet (20 mg) by mouth 2 times daily, Disp: 180 tablet, Rfl: 3     fluticasone (FLONASE) 50 MCG/ACT nasal spray, SHAKE LIQUID AND USE 1 SPRAY IN EACH NOSTRIL DAILY, Disp: 16 g, Rfl: 11     loratadine (CLARITIN) 10 MG tablet, Take 10 mg by mouth daily Patient takes at bedtime, Disp: , Rfl:      metFORMIN (GLUCOPHAGE-XR) 500 MG 24 hr tablet, Take 4 tablets (2,000 mg) by mouth daily (with breakfast), Disp: 360 tablet, Rfl: 3     multivitamin (CENTRUM SILVER) tablet, Take 1 tablet by mouth daily, Disp: , Rfl:      mycophenolate (GENERIC EQUIVALENT) 250 MG capsule, Take 3 capsules (750 mg) by mouth 2 times daily, Disp: 180 capsule, Rfl: 11     sulfamethoxazole-trimethoprim (BACTRIM) 400-80 MG tablet, Take 1 tablet by mouth daily, Disp: 30 tablet, Rfl: 11     tacrolimus (GENERIC EQUIVALENT) 0.5 MG capsule, Take ONE cap every PM (0.5 mg every evening), Disp: 90 capsule, Rfl: 3     tacrolimus (GENERIC EQUIVALENT) 1 MG capsule, TAKE 1 CAPSULE BY MOUTH EVERY MORNING, Disp: 90 capsule, Rfl: 3     Zinc Sulfate (ZINC 15 PO), , Disp: , Rfl:   No current facility-administered medications for this visit.    Facility-Administered Medications Ordered in Other Visits:      diatrizoate meglumine-sodium (GASTROGRAFIN/GASTROVIEW) 66-10 % solution 480 mL, 480 mL, Rectal, Once, Christopher Baker MD    Allergies:    Norco [hydrocodone-acetaminophen], Cats,  Isosorbide, Penicillins, Seasonal allergies, and Shrimp    Physical Examination:    The patient is a well developed, well nourished male in no apparent distress.  He is normocepahlic, atraumatic with pupils equally round and reactive to light.    Ear Examination: Ear canals clear, tympanic membranes and middle ear spaces normal  Neurological Examination: Facial nerve function intact and symmetric  Integumentary Examination: No lesions on the skin of the head or neck    Assessment and Plan:    The patient presents with a history of asymmetric sensorineural hearing loss in the left ear. He was referred for an MRI scan of the head to assess for retrocochlear pathology and he will be referred an Audiology consultation for possible CROS hearing amplification. Based upon these evaluations, he will proceed with his neurology evaluation and he will proceed with his fitting of hearing amplification.     CC: Dr. Tristan Bañuelos   CC: Emilia Knutson

## 2021-12-23 NOTE — LETTER
12/23/2021       RE: Murray Nicholson  [address]  [place] [address]     Dear Colleague,    Thank you for referring your patient, Murray Nicholson, to the [facility] E[facility] [place] at [facility]. Please see a copy of my visit note below.    The patient presents with a history of severe sensorineural hearing loss in the right ear that occurred suddenly more than ten years ago. He had difficulty with dizziness at the time of the event, but he denies dizziness issues since that time.      He was referred for an MRI scan of the head to assess for retrocochlear pathology and he will be referred an Audiology consultation for possible CROS hearing amplification.     Her MRI scan demonstrates:    Impression:    1. Normal bilateral seventh and eighth cranial nose without any  abnormal enhancement.  2. Sequela of chronic small vessel ischemic disease in the bilateral  cerebral white matter with chronic infarcts in the right frontal and  left occipital lobes.       His Audiogram and Tympanogram demonstrates a profound sensorineural hearing loss in the left ear with 0% word recognition. His right ear hearing is within the normal range with 100% word recognition. His tympanograms are normal bilaterally.       All other systems were reviewed and they are either negative or they are not directly pertinent to this Otolaryngology examination.      Past Medical History:    Past Medical History:   Diagnosis Date     (HFpEF) heart failure with preserved ejection fraction (H)      Allergic rhinitis, cause unspecified      Anemia of chronic kidney failure      Aortic stenosis 8/13/2008    Overview:  Bicuspid aortic valve     AS (aortic stenosis)      Ascending aortic aneurysm (H)      Bicuspid aortic valve      Bilateral hand pain 6/1/2021     CAD (coronary artery disease)      Congestive heart failure, unspecified      Coronary artery disease  involving native artery of transplanted heart without angina pectoris 7/10/2014     Dialysis patient (H)     Tues-Thur-Sat     Dyslipidemia      Esophageal reflux      ESRD (end stage renal disease) (H)      Hearing problem      Heart replaced by transplant (H) 12/10/2018     Hypersomnia with sleep apnea, unspecified      Hypertension      Ileostomy status (H)      Immunosuppression (H)      MGUS (monoclonal gammopathy of unknown significance)      Mitral regurgitation      SHEELA (obstructive sleep apnea)     No CPAP     Pneumonia      Sigmoid diverticulitis 8/14/2018     Status post coronary angiogram 6/28/2019     Systolic heart failure (H)      Type 2 diabetes mellitus (H)        Past Surgical History:    Past Surgical History:   Procedure Laterality Date     BIOPSY       CARDIAC SURGERY       COLONOSCOPY N/A 5/3/2018    Procedure: COLONOSCOPY;  colonoscopy ;  Surgeon: Ammon Castillo MD;  [facility]     CREATE FISTULA ARTERIOVENOUS UPPER EXTREMITY BOVINE Left 5/8/2019    Procedure: Left Upper Extremity Arteriovenous Bovine Graft Creation;  Surgeon: Calin Cheney MD;  [facility] OR     CV CORONARY ANGIOGRAM N/A 6/28/2019    Procedure: CV CORONARY ANGIOGRAM;  Surgeon: Montrell Posada MD;  [facility]     CV CORONARY ANGIOGRAM N/A 7/15/2020    Procedure: CV CORONARY ANGIOGRAM;  Surgeon: Marcelo Ramírez MD;  [facility]     CV CORONARY ANGIOGRAM N/A 6/7/2021    Procedure: CV CORONARY ANGIOGRAM;  Surgeon: Gilberto Mccann MD;  [facility]     CV HEART BIOPSY N/A 2/1/2019    Procedure: HBX;  Surgeon: Montrell Posada MD;  [facility]     CV HEART BIOPSY N/A 3/22/2019    Procedure: HBX, RIJV ACCESS;  Surgeon: Jordan oFx MD;  [facility]     CV HEART BIOPSY N/A 6/28/2019    Procedure: CV HEART BIOPSY;  Surgeon: Montrell Posada MD;  [facility]  LAB     CV HEART BIOPSY N/A 10/28/2019    Procedure: CV HEART BIOPSY;  Surgeon: Marcelo Ramírez MD;  Location:  HEART CARDIAC CATH LAB     CV HEART BIOPSY N/A 2/6/2020    Procedure: CV HEART BIOPSY;  Surgeon: Montrell Posada MD;  Location:  HEART CARDIAC CATH LAB     CV HEART BIOPSY N/A 7/15/2020    Procedure: CV HEART BIOPSY;  Surgeon: Marcelo Ramírez MD;  Location:  HEART CARDIAC CATH LAB     CV RIGHT HEART CATH MEASUREMENTS RECORDED N/A 6/28/2019    Procedure: CV RIGHT HEART CATH;  Surgeon: Montrell Posada MD;  Location:  HEART CARDIAC CATH LAB     CV RIGHT HEART CATH MEASUREMENTS RECORDED N/A 7/15/2020    Procedure: CV RIGHT HEART CATH;  Surgeon: Marcelo Ramírez MD;  Location:  HEART CARDIAC CATH LAB     CV RIGHT HEART CATH MEASUREMENTS RECORDED N/A 6/7/2021    Procedure: CV RIGHT HEART CATH;  Surgeon: Gilberto Mccann MD;  Location:  HEART CARDIAC CATH LAB     ENDOSCOPIC ULTRASOUND UPPER GASTROINTESTINAL TRACT (GI) N/A 11/8/2021    Procedure: ENDOSCOPIC ULTRASOUND, ESOPHAGOSCOPY / UPPER GASTROINTESTINAL TRACT (GI)  EUS with FNA;  Surgeon: Alon Don MD;  Location: [facility] OR     ESOPHAGOSCOPY, GASTROSCOPY, DUODENOSCOPY (EGD), COMBINED N/A 5/7/2018    Procedure: COMBINED ENDOSCOPIC ULTRASOUND, ESOPHAGOSCOPY, GASTROSCOPY, DUODENOSCOPY (EGD), FINE NEEDLE ASPIRATE/BIOPSY;  Endoscopic Ultrasound with Fine Needle Aspiration ;  Surgeon: Alon Don MD;  [facility]     EXAM UNDER ANESTHESIA RECTUM N/A 8/12/2018    Procedure: EXAM UNDER ANESTHESIA RECTUM;  EXAM UNDER ANESTHESIA RECTUM ,COMBINED INCISION AND DRAINAGE OF RECTAL ABCESS ;  Surgeon: Rick Tran MD;  [facility]     INCISION AND DRAINAGE RECTUM, COMBINED N/A 8/12/2018    Procedure: COMBINED INCISION AND DRAINAGE RECTUM;;  Surgeon: Rick Tran MD;  [facility]     IR DIALYSIS FISTULOGRAM LEFT  9/25/2019     IR DIALYSIS FISTULOGRAM LEFT  11/22/2019     IR  DIALYSIS PTA  9/25/2019     IR DIALYSIS PTA  11/22/2019     LAPAROSCOPIC ASSISTED COLOSTOMY TAKEDOWN N/A 12/11/2018    Procedure: Laparoscopic Assisted Colostomy Takedown, Laparoscopic Lysis of Adhesions;  Surgeon: Rick Tran MD;  [facility]     LAPAROSCOPIC ASSISTED SIGMOID COLECTOMY N/A 8/14/2018    Procedure: LAPAROSCOPIC ASSISTED SIGMOID COLECTOMY;  Laparoscopic Hand Assisted Takedown of Splenic Flexure, Sigmoidectomy, Small Bowel Resection, Takedown of Small Bowel to Colon Fistula;  Surgeon: Rick Tran MD;  [facility]     LAPAROSCOPIC HERNIORRHAPHY INGUINAL BILATERAL Bilateral 7/24/2015    Procedure: LAPAROSCOPIC HERNIORRHAPHY INGUINAL BILATERAL;  Surgeon: Bobby Mcconnell MD;  [facility]     LAPAROSCOPIC INSERTION CATHETER PERITONEAL DIALYSIS N/A 6/22/2017    Procedure: LAPAROSCOPIC INSERTION CATHETER PERITONEAL DIALYSIS;  Laparoscopic Peritoneal Dialysis Catheter Placement - Anesthesia with block;  Surgeon: Esteban Arvizu MD;  [facility]     PICC INSERTION Left 04/22/2018    5Fr - 49cm (3cm external), Basilic vein, low SVC     REMOVE CATHETER PERITONEAL Right 1/15/2018    Procedure: REMOVE CATHETER PERITONEAL;  Open Removal of Peritoneal Dialysis Catheter ;  Surgeon: Esteban Arvizu MD;  [facility]     SIGMOIDOSCOPY FLEXIBLE N/A 11/21/2018    Procedure: Examination Under Anesthesia, Flexible Sigmoidoscopy and Polypectomy;  Surgeon: Rick Tran MD;  [facility]     SIGMOIDOSCOPY FLEXIBLE N/A 12/11/2018    Procedure: Flexible Sigmoidoscopy;  Surgeon: Rick Tran MD;  [facility]     TAKEDOWN ILEOSTOMY N/A 3/27/2019    Procedure: Takedown Ileostomy;  Surgeon: Rick Tran MD;  [facility]     TRANSPLANT HEART RECIPIENT N/A 6/14/2018    Procedure: TRANSPLANT HEART RECIPIENT;  Median Sternotomy, on-pump oxygenator, Heart Transplant;  Surgeon: Rony Caputo MD;  [facility]     TRANSPLANT  KIDNEY RECIPIENT LIVING UNRELATED  12/2019       Medications:      Current Outpatient Medications:      amLODIPine (NORVASC) 2.5 MG tablet, Take 2 tablets (5 mg) by mouth daily, Disp: 180 tablet, Rfl: 3     aspirin 81 MG EC tablet, Take 81 mg by mouth daily, Disp: , Rfl:      atorvastatin (LIPITOR) 40 MG tablet, TAKE 1 TABLET(40 MG) BY MOUTH DAILY, Disp: 90 tablet, Rfl: 3     biotin 1000 MCG TABS tablet, Take 1,000 mcg by mouth daily Patient takes every other day, Disp: , Rfl:      blood glucose (ACCU-CHEK GUIDE) test strip, Use to test blood sugar 3 times daily or as directed., Disp: 300 strip, Rfl: 3     blood glucose monitoring (ACCU-CHEK FASTCLIX) lancets, USE TO TEST 3 TIMES DAILY OR AS DIRECTED., Disp: 300 each, Rfl: 3     calcium citrate and vitamin D (CITRACAL) 200-250 MG-UNIT TABS per tablet, Take 1 tablet by mouth 2 times daily, Disp: , Rfl:      famotidine (PEPCID) 20 MG tablet, Take 1 tablet (20 mg) by mouth 2 times daily, Disp: 180 tablet, Rfl: 3     fluticasone (FLONASE) 50 MCG/ACT nasal spray, SHAKE LIQUID AND USE 1 SPRAY IN EACH NOSTRIL DAILY, Disp: 16 g, Rfl: 11     loratadine (CLARITIN) 10 MG tablet, Take 10 mg by mouth daily Patient takes at bedtime, Disp: , Rfl:      metFORMIN (GLUCOPHAGE-XR) 500 MG 24 hr tablet, Take 4 tablets (2,000 mg) by mouth daily (with breakfast), Disp: 360 tablet, Rfl: 3     multivitamin (CENTRUM SILVER) tablet, Take 1 tablet by mouth daily, Disp: , Rfl:      mycophenolate (GENERIC EQUIVALENT) 250 MG capsule, Take 3 capsules (750 mg) by mouth 2 times daily, Disp: 180 capsule, Rfl: 11     sulfamethoxazole-trimethoprim (BACTRIM) 400-80 MG tablet, Take 1 tablet by mouth daily, Disp: 30 tablet, Rfl: 11     tacrolimus (GENERIC EQUIVALENT) 0.5 MG capsule, Take ONE cap every PM (0.5 mg every evening), Disp: 90 capsule, Rfl: 3     tacrolimus (GENERIC EQUIVALENT) 1 MG capsule, TAKE 1 CAPSULE BY MOUTH EVERY MORNING, Disp: 90 capsule, Rfl: 3     Zinc Sulfate (ZINC 15 PO), , Disp: ,  Rfl:   No current facility-administered medications for this visit.    Facility-Administered Medications Ordered in Other Visits:      diatrizoate meglumine-sodium (GASTROGRAFIN/GASTROVIEW) 66-10 % solution 480 mL, 480 mL, Rectal, Once, Christopher Baker MD    Allergies:    Norco [hydrocodone-acetaminophen], Cats, Isosorbide, Penicillins, Seasonal allergies, and Shrimp    Physical Examination:    The patient is a well developed, well nourished male in no apparent distress.  He is normocepahlic, atraumatic with pupils equally round and reactive to light.    Ear Examination: Ear canals clear, tympanic membranes and middle ear spaces normal  Neurological Examination: Facial nerve function intact and symmetric  Integumentary Examination: No lesions on the skin of the head or neck    Assessment and Plan:    The patient presents with a history of asymmetric sensorineural hearing loss in the left ear. He was referred for an MRI scan of the head to assess for retrocochlear pathology and he will be referred an Audiology consultation for possible CROS hearing amplification. Based upon these evaluations, he will proceed with his neurology evaluation and he will proceed with his fitting of hearing amplification.     CC: Dr. Tristan Bañuelos   CC: Emilia Knutson      Again, thank you for allowing me to participate in the care of your patient.      Sincerely,    Noel Godwin MD

## 2021-12-23 NOTE — PATIENT INSTRUCTIONS
1. You were seen in the ENT Clinic today by Dr. Godwin.  If you have any questions or concerns after your appointment, please call   - Option 1: ENT Clinic: [phone]   - Option 2: Amrita (Dr. Godwin's Nurse): [phone]         Mellisa(Dr. Godwin's Nurse): [phone]    2.   Plan to return to clinic as needed    3. Hearing aid consult    Amrita Winchester LPN  [facility] - Otolaryngology    The patient presents with a history of asymmetric sensorineural hearing loss in the left ear. He was referred for an MRI scan of the head to assess for retrocochlear pathology and he will be referred an Audiology consultation for possible CROS hearing amplification. Based upon these evaluations, he will proceed with his neurology evaluation and he will proceed with his fitting of hearing amplification. General information on BAHA is below: you will receive more specific information at time of appointment with Audiology.    What are bone anchored devices?    A bone anchored device is a surgically implantable system for treatment of hearing loss that works through direct bone conduction. It has been used since 1977, and was cleared by the FDA in 1996 as a treatment for conductive and mixed hearing losses in the [place]. In 2002, the FDA approved its use for the treatment of unilateral sensorineural hearing loss.   Bone anchored devices are used to help people with chronic ear infections, congenital external auditory canal atresia and single sided deafness who cannot benefit from conventional hearing aids. The system is surgically implanted and allows sound to be conducted through the bone rather than via the middle ear - a process known as direct bone conduction.  How does a bone anchored device work?  The bone anchored device consists of three parts: a titanium implant, an external abutment, and a sound processor. The system works by enhancing natural bone transmission as a pathway for sound to travel to the inner  ear, bypassing the external auditory canal and middle ear. The titanium implant is placed during a short surgical procedure and over time naturally integrates with the skull bone. For hearing, the sound processor transmits sound vibrations through the external abutment to the titanium implant. The vibrating implant sets up vibrations within the skull and inner ear that finally stimulate the nerve fibers of the inner ear, allowing hearing.  Who is a candidate for a bone anchored device?  Bone anchored devices are used to rehabilitate people with conductive and mixed loss hearing impairment. This includes people with chronic infection of the ear canal, people with absence of or a very narrow ear canal as a result of a congenital ear malformation, infection, or surgery, and people with a single sided hearing loss as a result of surgery for a vestibular schwannoma (a tumor of the balance and hearing nerves).     Other options may include CROS hearing aids, or a pocket talker for some situations.  Further information is covered during audiological consult for single sided deafness.  Links    [email or web address]    [email or web address]    Codes/insurance:    BAHA procedure 71948  Device   [email or web address]

## 2021-12-23 NOTE — NURSING NOTE
"Chief Complaint   Patient presents with     RECHECK     follow up after MRI     Blood pressure 112/75, pulse 91, temperature 98.3  F (36.8  C), height 1.702 m (5' 7\"), weight 80.7 kg (178 lb), SpO2 100 %.    Manfred Ramirez LPN    "

## 2022-01-01 NOTE — TELEPHONE ENCOUNTER
"Coordinator called pt to review kidney selection committee outcome from today. Waiting on Dr. Lakhani to review pt's abd/pelvis CT from 11/2018 to verify placement for kidney. Once this has been completed pt can be activated on the WL. Pt is scheduled for dermatology on 10/25/19. Pt states he would like to have all future PRA labs drawn here and will call coordinator to set up lab appointment.    Reviewed what to do when pt gets a call with a kidney offer.  Reviewed general components of inpt stay and post-transplant routines including frequent appointments here as an outpt x1-2 weeks post-transplant.  .  Next ALA sample is due 12/2019(pt will have drawn here at our lab).  Encouraged pt to share living donor web site to anyone who expresses interest in getting tested as a living donor. Requested pt contact me with any changes in his health, insurance, or contact information.  Instructed pt to call with any questions. Pt verbalized good understanding and had no further questions.       Updated pt's post heart coordinator on plan.    Dr. Lakhani reviewed pt's ct: \"There is a place to sew artery, but both right and left side have some pretty dense proximal Ca.  Doppler flows show local areas of velocity change, but interpreted as normal.  Should be ok...prefer R side rather than L.\"    Pt updated that he is now active on the kidney transplant waitlist.    UNOS updated, immunology & PFR notified, change in status letter routed to admin team to send out.    "
Statement Selected

## 2022-01-02 ENCOUNTER — HEALTH MAINTENANCE LETTER (OUTPATIENT)
Age: 67
End: 2022-01-02

## 2022-01-03 ENCOUNTER — MYC MEDICAL ADVICE (OUTPATIENT)
Dept: FAMILY MEDICINE | Facility: CLINIC | Age: 67
End: 2022-01-03
Payer: MEDICARE

## 2022-01-03 DIAGNOSIS — E11.29 TYPE 2 DIABETES MELLITUS WITH OTHER DIABETIC KIDNEY COMPLICATION, WITHOUT LONG-TERM CURRENT USE OF INSULIN (H): Primary | ICD-10-CM

## 2022-01-08 PROBLEM — E83.42 HYPOMAGNESEMIA: Status: RESOLVED | Noted: 2019-12-24 | Resolved: 2022-01-08

## 2022-01-11 ENCOUNTER — LAB (OUTPATIENT)
Dept: LAB | Facility: CLINIC | Age: 67
End: 2022-01-11
Attending: INTERNAL MEDICINE
Payer: MEDICARE

## 2022-01-11 DIAGNOSIS — Z94.0 KIDNEY REPLACED BY TRANSPLANT: Primary | ICD-10-CM

## 2022-01-11 DIAGNOSIS — E11.29 TYPE 2 DIABETES MELLITUS WITH OTHER DIABETIC KIDNEY COMPLICATION, WITHOUT LONG-TERM CURRENT USE OF INSULIN (H): ICD-10-CM

## 2022-01-11 DIAGNOSIS — Z94.0 KIDNEY REPLACED BY TRANSPLANT: ICD-10-CM

## 2022-01-11 LAB
ANION GAP SERPL CALCULATED.3IONS-SCNC: 8 MMOL/L (ref 3–14)
BUN SERPL-MCNC: 19 MG/DL (ref 7–30)
CALCIUM SERPL-MCNC: 9.7 MG/DL (ref 8.5–10.1)
CHLORIDE BLD-SCNC: 105 MMOL/L (ref 94–109)
CO2 SERPL-SCNC: 26 MMOL/L (ref 20–32)
CREAT SERPL-MCNC: 0.99 MG/DL (ref 0.66–1.25)
CREAT UR-MCNC: 166 MG/DL
ERYTHROCYTE [DISTWIDTH] IN BLOOD BY AUTOMATED COUNT: 13.4 % (ref 10–15)
GFR SERPL CREATININE-BSD FRML MDRD: 83 ML/MIN/1.73M2
GLUCOSE BLD-MCNC: 225 MG/DL (ref 70–99)
HBA1C MFR BLD: 6.9 % (ref 0–5.6)
HCT VFR BLD AUTO: 39.7 % (ref 40–53)
HGB BLD-MCNC: 12.9 G/DL (ref 13.3–17.7)
MCH RBC QN AUTO: 29.3 PG (ref 26.5–33)
MCHC RBC AUTO-ENTMCNC: 32.5 G/DL (ref 31.5–36.5)
MCV RBC AUTO: 90 FL (ref 78–100)
PLATELET # BLD AUTO: 195 10E3/UL (ref 150–450)
POTASSIUM BLD-SCNC: 3.8 MMOL/L (ref 3.4–5.3)
PROT UR-MCNC: 0.26 G/L
PROT/CREAT 24H UR: 0.16 G/G CR (ref 0–0.2)
RBC # BLD AUTO: 4.4 10E6/UL (ref 4.4–5.9)
SODIUM SERPL-SCNC: 139 MMOL/L (ref 133–144)
TACROLIMUS BLD-MCNC: 6 UG/L (ref 5–15)
TME LAST DOSE: NORMAL H
TME LAST DOSE: NORMAL H
WBC # BLD AUTO: 4.8 10E3/UL (ref 4–11)

## 2022-01-11 PROCEDURE — 86833 HLA CLASS II HIGH DEFIN QUAL: CPT | Performed by: INTERNAL MEDICINE

## 2022-01-11 PROCEDURE — 80197 ASSAY OF TACROLIMUS: CPT | Performed by: INTERNAL MEDICINE

## 2022-01-11 PROCEDURE — 36415 COLL VENOUS BLD VENIPUNCTURE: CPT | Performed by: PATHOLOGY

## 2022-01-11 PROCEDURE — 84156 ASSAY OF PROTEIN URINE: CPT | Performed by: PATHOLOGY

## 2022-01-11 PROCEDURE — 80048 BASIC METABOLIC PNL TOTAL CA: CPT | Performed by: PATHOLOGY

## 2022-01-11 PROCEDURE — 83036 HEMOGLOBIN GLYCOSYLATED A1C: CPT | Performed by: PATHOLOGY

## 2022-01-11 PROCEDURE — 85027 COMPLETE CBC AUTOMATED: CPT | Performed by: PATHOLOGY

## 2022-01-11 PROCEDURE — 86832 HLA CLASS I HIGH DEFIN QUAL: CPT | Performed by: INTERNAL MEDICINE

## 2022-01-12 ENCOUNTER — LAB REQUISITION (OUTPATIENT)
Dept: LAB | Facility: CLINIC | Age: 67
End: 2022-01-12
Payer: MEDICARE

## 2022-01-12 ENCOUNTER — TRANSFERRED RECORDS (OUTPATIENT)
Dept: HEALTH INFORMATION MANAGEMENT | Facility: CLINIC | Age: 67
End: 2022-01-12
Payer: MEDICARE

## 2022-01-12 LAB
DONOR IDENTIFICATION: NORMAL
DONOR IDENTIFICATION: NORMAL
DSA COMMENTS: NORMAL
DSA COMMENTS: NORMAL
DSA PRESENT: NO
DSA PRESENT: NO
DSA TEST METHOD: NORMAL
DSA TEST METHOD: NORMAL
ORGAN: NORMAL
ORGAN: NORMAL
SA 1 CELL: NORMAL
SA 1 TEST METHOD: NORMAL
SA 2 CELL: NORMAL
SA 2 TEST METHOD: NORMAL
SA1 HI RISK ABY: NORMAL
SA1 MOD RISK ABY: NORMAL
SA2 HI RISK ABY: NORMAL
SA2 MOD RISK ABY: NORMAL
UNACCEPTABLE ANTIGENS: NORMAL
UNOS CPRA: 0
ZZZSA 1  COMMENTS: NORMAL
ZZZSA 2 COMMENTS: NORMAL

## 2022-01-13 ENCOUNTER — OFFICE VISIT (OUTPATIENT)
Dept: AUDIOLOGY | Facility: CLINIC | Age: 67
End: 2022-01-13

## 2022-01-13 DIAGNOSIS — H90.3 ASYMMETRIC SNHL (SENSORINEURAL HEARING LOSS): Primary | ICD-10-CM

## 2022-01-13 PROCEDURE — V5200 DISP FEE CONTRALATERAL MONAU: HCPCS | Performed by: AUDIOLOGIST

## 2022-01-13 PROCEDURE — V5181 HEARING AID MONAURAL BTE: HCPCS | Performed by: AUDIOLOGIST

## 2022-01-13 PROCEDURE — V5020 CONFORMITY EVALUATION: HCPCS | Performed by: AUDIOLOGIST

## 2022-01-13 PROCEDURE — V5011 HEARING AID FITTING/CHECKING: HCPCS | Performed by: AUDIOLOGIST

## 2022-01-13 NOTE — PROGRESS NOTES
AUDIOLOGY REPORT    SUBJECTIVE: Murray Nicholson is a 67 year old male who was seen in the Audiology Clinic at the [facility] for a fitting of a Phonak BiCROS system. Previous results have revealed a bilateral asymmetric sensorineural hearing loss (essentially normal hearing in the right ear). The patient was given medical clearance to pursue amplification by  Neol Godwin MD.. He is a new user of hearing aids.  The patient signed the notice of non-covered service form.    OBJECTIVE: The hearing aid conformity evaluation was completed.The hearing aids were placed and they provided a good fit. In an effort to reduce close contact during the Covid-19 pandemic, simulated Real-ear measurements were completed on the AccelOps system and were a good match to NAL-NL1 target with soft sounds audible, moderate sounds comfortable, and loud sounds below discomfort. UCLs are verified through maximum power output measures and demonstrate appropriate limiting of loud inputs.Note: after the fitting when trying to print the simulated real-ear measures, the Imaxioifit system crashed and the measurements were lost. Murray was oriented to proper hearing aid use, care, cleaning (no water, dry brush), batteries (size: BATTERY SIZE: rechargeable, insertion/removal, toxicity, low-battery signal), aid insertion/removal, user booklet, warranty information, storage cases, and other hearing aid details. The patient confirmed understanding of hearing aid use and care, and showed proper insertion of hearing aid and batteries while in the office today.Murray reported good volume and sound quality today.   Hearing aids were programmed as follows:  Program 1:AutoSense  Toggle is active for volume, on/off, and phone use    EAR(S) FIT: Bilateral  HEARING AID MODEL NAME: Phonak Audeo P30 R (right) and Phonak Audeo CROS P transmitter (left)  HEARING AID STYLE: -in-the-ear behind-the-ear  EARMOLDS/TIP:  medium open  SERIAL NUMBERS: Right: [id number] Left: [id number]  WARRANTY END DATE: 3/16/2025    A review of how to change volume was performed and he expressed understanding.  The hearing aid was paired with his Treedom phone for streaming. He practiced using it in office and expressed understanding.    ASSESSMENT: Bilateral hearing devices were fit today. Verification measures were performed. Murray signed the Hearing Aid Purchase Agreement and was given a copy, as well as details on his hearing aids. Patient was counseled that exact out of pocket amounts cannot be determined for hearing aid claims being sent to insurance. Any insurance coverage information presented to the patient is an estimate only, and is not a guarantee of payment. Patient has been advised to check with their own insurance.    PLAN:Murray will return for follow-up in 2-3 weeks for a hearing aid review appointment. Please call this clinic with questions regarding today s appointment.    Nehal Christopher  Audiologist  [place] License  #[id number]

## 2022-01-16 LAB — ALLOSURE DD-CFDNA: 0.3 %

## 2022-01-20 ENCOUNTER — MYC MEDICAL ADVICE (OUTPATIENT)
Dept: FAMILY MEDICINE | Facility: CLINIC | Age: 67
End: 2022-01-20
Payer: MEDICARE

## 2022-01-20 DIAGNOSIS — M79.671 RIGHT FOOT PAIN: Primary | ICD-10-CM

## 2022-01-20 NOTE — TELEPHONE ENCOUNTER
Dr Narvaez -   Would you see him in clinic or just refer to podiatry?  Do you treat plantar warts?  Ashlie Lopez RN  [facility]

## 2022-01-20 NOTE — TELEPHONE ENCOUNTER
Writer responded via SwapBeats.    REMI KaurN, RN  [facility]

## 2022-02-04 ENCOUNTER — OFFICE VISIT (OUTPATIENT)
Dept: AUDIOLOGY | Facility: CLINIC | Age: 67
End: 2022-02-04
Payer: MEDICARE

## 2022-02-04 DIAGNOSIS — H90.3 ASYMMETRIC SNHL (SENSORINEURAL HEARING LOSS): Primary | ICD-10-CM

## 2022-02-04 PROCEDURE — 99207 PR ASSESSMENT FOR HEARING AID: CPT | Performed by: AUDIOLOGIST

## 2022-02-04 NOTE — PROGRESS NOTES
This patient was seen and examined while on dialysis. Professional oversight of the patient's dialysis care, access care and dialysis related co-morbidities were addressed as necessary with the patient and / or staff.     Access -TDC (4/17/18) no issues  BFR - 400   UF Goal - 2 L ( 77 kg)    Exam  BP 98/62 mmHG  CTA  no edema    Recent Labs   Lab Test  05/12/18   0656  05/10/18   0840   NA  137  135   POTASSIUM  4.6  4.6   CHLORIDE  98  97   CO2  30  30   ANIONGAP  9  8   GLC  125*  160*   BUN  28  26   CR  7.07*  7.60*   TRINI  9.6  9.2       Assessment -   Murray Nicholson is a 63 yr old male with PMH of HTN, DMII, functionally bicuspid aortic valve, CHF/CM (EF 10-15%), ESRD, admitted with worsening dyspnea/SOB, now on dobutamine drip and being evaluated for heart/kidney transplant.  Plan -  Stable dialysis  Next HD on Tuesday    David Fried MD   May 12, 2018       Vital signs noted, see flowsheet.  General: NAD, well appearing and non-toxic.  HEENT: NC/AT. MMM. + nasal discharge, throat clear.  Conjunctiva and sclera clear b/l.  EOMI. PERRL.  Neck: Soft and supple, full ROM without pain.  Cardiac: RRR. +S1/S2. Peripheral pulses 2+ and symmetric b/l. No LE edema.  Respiratory: Speaking in full sentences, no evidence of respiratory distress. Lungs CTA b/l, no wheezes/rhonchi/rales/stridor.   Abdomen: BSx4. Soft, NTND. No guarding or rebound tenderness. No suprapubic tenderness.  Back: Spine midline and non-tender. No CVAT.  Skin: Normal color for race, no evidence of rash, ecchymosis, cyanosis or jaundice.   Lymph: No cervical lymphadenopathy  Neuro: Awake, alert and oriented to person/place/time/situation. Moves all extremities spontaneously and symmetrically.  No focal deficit  Psych: Normal affect

## 2022-02-04 NOTE — PROGRESS NOTES
"AUDIOLOGY REPORT    SUBJECTIVE:Murray Nicholson is a 67 year old male who was seen in the Audiology Clinic at the [facility] on 2/4/2022  for a follow-up check regarding the fitting of new hearing aids. Previous results have revealed a bilateral asymmetric sensorineural hearing loss (essentially normal hearing in the right ear). The patient has been seen previously in this clinic and was fit with a right Phonak Audeo P30R hearing aid on the right ear and a Phonak CROS P transmitter on the left ear on 1/13/2022.  Murray reports he is seeing good benefit with the hearing aids and plans on keeping them.  He reports that he feels he is able to localize sound with the devices.    OBJECTIVE:   Based on patient report, the following changes were made;No changes were made to the programming today. The patient reported he typically turns things up \"1 notch\" while at work but does not feel that he needs his base setting changed today. A review of the beeps in relation to volume control was reviewed and he expressed understanding.    Reviewed 45 day trial period, care, cleaning (no water, dry brush), batteries (rechargeable) insertion/removal, toxicity, low-battery signal), aid insertion/removal, volume adjustment (if applicable), user booklet, warranty information, storage cases, and other hearing aid details.     No charge visit today (in warranty hearing aid check).     ASSESSMENT: A follow-up appointment for hearing aid fitting was completed today.  Changes to hearing aid was completed as outlined above.     PLAN:Murray will return for follow-up as needed, or at least every 6-9 months for cleaning and assessment of hearing aid.  . Please call this clinic with any questions regarding today s appointment.    Nehal Christopher  Audiologist  [place] License  #[id number]      "

## 2022-02-08 ENCOUNTER — LAB (OUTPATIENT)
Dept: LAB | Facility: CLINIC | Age: 67
End: 2022-02-08
Payer: MEDICARE

## 2022-02-08 DIAGNOSIS — Z94.0 KIDNEY REPLACED BY TRANSPLANT: ICD-10-CM

## 2022-02-08 DIAGNOSIS — Z94.0 KIDNEY REPLACED BY TRANSPLANT: Primary | ICD-10-CM

## 2022-02-08 LAB
ANION GAP SERPL CALCULATED.3IONS-SCNC: 10 MMOL/L (ref 3–14)
BUN SERPL-MCNC: 16 MG/DL (ref 7–30)
CALCIUM SERPL-MCNC: 9.6 MG/DL (ref 8.5–10.1)
CHLORIDE BLD-SCNC: 105 MMOL/L (ref 94–109)
CO2 SERPL-SCNC: 23 MMOL/L (ref 20–32)
CREAT SERPL-MCNC: 0.91 MG/DL (ref 0.66–1.25)
ERYTHROCYTE [DISTWIDTH] IN BLOOD BY AUTOMATED COUNT: 13.1 % (ref 10–15)
GFR SERPL CREATININE-BSD FRML MDRD: >90 ML/MIN/1.73M2
GLUCOSE BLD-MCNC: 215 MG/DL (ref 70–99)
HCT VFR BLD AUTO: 39.8 % (ref 40–53)
HGB BLD-MCNC: 13.1 G/DL (ref 13.3–17.7)
MCH RBC QN AUTO: 29.5 PG (ref 26.5–33)
MCHC RBC AUTO-ENTMCNC: 32.9 G/DL (ref 31.5–36.5)
MCV RBC AUTO: 90 FL (ref 78–100)
PLATELET # BLD AUTO: 206 10E3/UL (ref 150–450)
POTASSIUM BLD-SCNC: 4 MMOL/L (ref 3.4–5.3)
RBC # BLD AUTO: 4.44 10E6/UL (ref 4.4–5.9)
SODIUM SERPL-SCNC: 138 MMOL/L (ref 133–144)
TACROLIMUS BLD-MCNC: 5.7 UG/L (ref 5–15)
TME LAST DOSE: NORMAL H
TME LAST DOSE: NORMAL H
WBC # BLD AUTO: 5 10E3/UL (ref 4–11)

## 2022-02-08 PROCEDURE — 80048 BASIC METABOLIC PNL TOTAL CA: CPT | Performed by: PATHOLOGY

## 2022-02-08 PROCEDURE — 85027 COMPLETE CBC AUTOMATED: CPT | Performed by: PATHOLOGY

## 2022-02-08 PROCEDURE — 80197 ASSAY OF TACROLIMUS: CPT | Performed by: INTERNAL MEDICINE

## 2022-02-08 PROCEDURE — 36415 COLL VENOUS BLD VENIPUNCTURE: CPT | Performed by: PATHOLOGY

## 2022-02-08 RX ORDER — SULFAMETHOXAZOLE AND TRIMETHOPRIM 400; 80 MG/1; MG/1
1 TABLET ORAL DAILY
Qty: 30 TABLET | Refills: 11 | Status: SHIPPED | OUTPATIENT
Start: 2022-02-08 | End: 2023-01-30

## 2022-02-09 ENCOUNTER — MYC MEDICAL ADVICE (OUTPATIENT)
Dept: TRANSPLANT | Facility: CLINIC | Age: 67
End: 2022-02-09
Payer: MEDICARE

## 2022-02-25 ENCOUNTER — OFFICE VISIT (OUTPATIENT)
Dept: ORTHOPEDICS | Facility: CLINIC | Age: 67
End: 2022-02-25
Payer: MEDICARE

## 2022-02-25 DIAGNOSIS — M79.671 RIGHT FOOT PAIN: ICD-10-CM

## 2022-02-25 DIAGNOSIS — L85.2 PUNCTATE KERATOSIS: Primary | ICD-10-CM

## 2022-02-25 DIAGNOSIS — L60.3 NAIL DYSTROPHY: ICD-10-CM

## 2022-02-25 PROCEDURE — 99203 OFFICE O/P NEW LOW 30 MIN: CPT | Performed by: PODIATRIST
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
## 2022-03-02 NOTE — TELEPHONE ENCOUNTER
FUTURE VISIT INFORMATION      FUTURE VISIT INFORMATION:    Date: 3/16/2022    Time: 9am    [place] [facility] [place]:    Referring provider:  Dr. Restrepo    Referring providers clinic:  [facility]     Reason for visit/diagnosis  Abnormal MRI     RECORDS REQUESTED FROM:       Clinic name Comments Records Status Imaging Status   Internal Dr. Godwin-12/23/2021    MR Brain-11/12/2021    CT Head-7/26/2018 Epic PACS

## 2022-03-07 NOTE — PROGRESS NOTES
"  WO Nurse Inpatient [facility]   WO Nurse Inpatient Adult     Follow Up Assessment   Assessment of new end Colostomy Stoma complication(s) none   Mucocutaneous junction;Not assessed  Peristomal complication(s) none   Pouch wear time:3-4 days,   Following today's visit:Patient /   is  able to demonstrate; ( per previus assessment)      1. How to empty their pouch? yes      2. How to change their pouch?  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     Physical Exam:  Current pouching system: Shashi one piece pouch with gas filter and ostomy paste  Reason for pouch change today: ostomy education and routine schedule  Stoma appearance: not asessed  Stoma size; 1 3/8\" x 1 7/8\" with good protrusion ,  ( not assessed 9/14/18)  Peristomal skin: intact, horizontal crease along inferior edge of stoma   Stoma output :brown and pasty   Surgical Site: open to air      Interventions:  Patient's chart evaluated.  Focus of today's visit: needs assessed for future teaching ( had already performed pouch change with nurse)  Preparation for discharge: Tasks previously completed by [facility])  Supplies ordered, Completed supply list, Registered for samples from , Prescriptions or note left on chart for MD to sign/complete, Discussed making a [facility] Nurse outpatient appointment upon discharge, Discussed when to follow up with a [facility] Nurse in the future and Discussed how/ where to order supplies  Participant of teaching session " Stable, no recent changes.   today patient   Orders: \Reviewed  Change made with ostomy management today: No:   flat cut to fit Shashi 1 piece with ostomy paste, barrier ring to fill in crease   Patient/family: performed with nurse tdoay with minimal assist to cut out pouch  Supplies: at bedside ( has own Hollisterpouch samples( using pouh that has automatic filter) and ostomy paste    Plan:  Learning needs: requesting he get assist in resizing  pattern for pouch  And to contniue praticing cutting out pouch( had some difficulty with nurse today in this part of pouch change)    Discussed plan of care with Patient and Nurse  Nursing to notify the Provider(s) and re-consult the WOC Nurse if new ostomy concerns or discharge planned before next planned WOC visit.    WOC Nurse will return:  Monday

## 2022-03-08 ENCOUNTER — LAB (OUTPATIENT)
Dept: LAB | Facility: CLINIC | Age: 67
End: 2022-03-08
Payer: MEDICARE

## 2022-03-08 DIAGNOSIS — Z94.0 KIDNEY REPLACED BY TRANSPLANT: ICD-10-CM

## 2022-03-08 LAB
ANION GAP SERPL CALCULATED.3IONS-SCNC: 10 MMOL/L (ref 3–14)
BUN SERPL-MCNC: 21 MG/DL (ref 7–30)
CALCIUM SERPL-MCNC: 10.1 MG/DL (ref 8.5–10.1)
CHLORIDE BLD-SCNC: 103 MMOL/L (ref 94–109)
CO2 SERPL-SCNC: 26 MMOL/L (ref 20–32)
CREAT SERPL-MCNC: 1.12 MG/DL (ref 0.66–1.25)
ERYTHROCYTE [DISTWIDTH] IN BLOOD BY AUTOMATED COUNT: 12.9 % (ref 10–15)
GFR SERPL CREATININE-BSD FRML MDRD: 72 ML/MIN/1.73M2
GLUCOSE BLD-MCNC: 205 MG/DL (ref 70–99)
HCT VFR BLD AUTO: 40.7 % (ref 40–53)
HGB BLD-MCNC: 13 G/DL (ref 13.3–17.7)
KAPPA LC FREE SER-MCNC: 1.87 MG/DL (ref 0.33–1.94)
KAPPA LC FREE/LAMBDA FREE SER NEPH: 1.04 {RATIO} (ref 0.26–1.65)
LAMBDA LC FREE SERPL-MCNC: 1.8 MG/DL (ref 0.57–2.63)
MCH RBC QN AUTO: 29.1 PG (ref 26.5–33)
MCHC RBC AUTO-ENTMCNC: 31.9 G/DL (ref 31.5–36.5)
MCV RBC AUTO: 91 FL (ref 78–100)
PLATELET # BLD AUTO: 195 10E3/UL (ref 150–450)
POTASSIUM BLD-SCNC: 4 MMOL/L (ref 3.4–5.3)
RBC # BLD AUTO: 4.47 10E6/UL (ref 4.4–5.9)
SODIUM SERPL-SCNC: 139 MMOL/L (ref 133–144)
TACROLIMUS BLD-MCNC: 7 UG/L (ref 5–15)
TME LAST DOSE: NORMAL H
TME LAST DOSE: NORMAL H
TOTAL PROTEIN SERUM FOR ELP: 7.2 G/DL (ref 6.8–8.8)
WBC # BLD AUTO: 4 10E3/UL (ref 4–11)

## 2022-03-08 PROCEDURE — 83521 IG LIGHT CHAINS FREE EACH: CPT | Mod: 59 | Performed by: INTERNAL MEDICINE

## 2022-03-08 PROCEDURE — 85027 COMPLETE CBC AUTOMATED: CPT | Performed by: PATHOLOGY

## 2022-03-08 PROCEDURE — 84165 PROTEIN E-PHORESIS SERUM: CPT | Mod: 26 | Performed by: PATHOLOGY

## 2022-03-08 PROCEDURE — 36415 COLL VENOUS BLD VENIPUNCTURE: CPT | Performed by: PATHOLOGY

## 2022-03-08 PROCEDURE — 80197 ASSAY OF TACROLIMUS: CPT | Performed by: INTERNAL MEDICINE

## 2022-03-08 PROCEDURE — 84165 PROTEIN E-PHORESIS SERUM: CPT | Mod: TC | Performed by: PATHOLOGY

## 2022-03-08 PROCEDURE — 84155 ASSAY OF PROTEIN SERUM: CPT | Performed by: INTERNAL MEDICINE

## 2022-03-08 PROCEDURE — 80048 BASIC METABOLIC PNL TOTAL CA: CPT | Performed by: PATHOLOGY

## 2022-03-09 LAB
ALBUMIN SERPL ELPH-MCNC: 4.4 G/DL (ref 3.7–5.1)
ALPHA1 GLOB SERPL ELPH-MCNC: 0.2 G/DL (ref 0.2–0.4)
ALPHA2 GLOB SERPL ELPH-MCNC: 0.7 G/DL (ref 0.5–0.9)
B-GLOBULIN SERPL ELPH-MCNC: 0.7 G/DL (ref 0.6–1)
GAMMA GLOB SERPL ELPH-MCNC: 1.2 G/DL (ref 0.7–1.6)
M PROTEIN SERPL ELPH-MCNC: 0.3 G/DL
PROT PATTERN SERPL ELPH-IMP: ABNORMAL

## 2022-03-15 ENCOUNTER — MYC MEDICAL ADVICE (OUTPATIENT)
Dept: FAMILY MEDICINE | Facility: CLINIC | Age: 67
End: 2022-03-15
Payer: MEDICARE

## 2022-03-15 ASSESSMENT — ENCOUNTER SYMPTOMS
SINUS PAIN: 0
TROUBLE SWALLOWING: 0
NECK MASS: 1
POOR WOUND HEALING: 0
TASTE DISTURBANCE: 0
SKIN CHANGES: 0
SINUS CONGESTION: 0
SORE THROAT: 0
HOARSE VOICE: 0
NAIL CHANGES: 0
SMELL DISTURBANCE: 0

## 2022-03-16 ENCOUNTER — PRE VISIT (OUTPATIENT)
Dept: NEUROLOGY | Facility: CLINIC | Age: 67
End: 2022-03-16

## 2022-03-16 ENCOUNTER — MYC MEDICAL ADVICE (OUTPATIENT)
Dept: TRANSPLANT | Facility: CLINIC | Age: 67
End: 2022-03-16

## 2022-03-16 DIAGNOSIS — Z94.1 HEART REPLACED BY TRANSPLANT (H): Primary | ICD-10-CM

## 2022-03-16 NOTE — TELEPHONE ENCOUNTER
Writer responded via [employer].    REMI KaurN, RN  [facility]

## 2022-03-16 NOTE — TELEPHONE ENCOUNTER
Appt tentatively scheduled with RACHEL Shearer CNP, on 3/22/22 at 0840.    Writer responded via [employer].    REMI KaurN, RN  [facility]

## 2022-03-18 DIAGNOSIS — K86.2 PANCREATIC CYST: Primary | ICD-10-CM

## 2022-03-22 ENCOUNTER — OFFICE VISIT (OUTPATIENT)
Dept: FAMILY MEDICINE | Facility: CLINIC | Age: 67
End: 2022-03-22
Payer: MEDICARE

## 2022-03-22 VITALS
OXYGEN SATURATION: 99 % | WEIGHT: 178.5 LBS | RESPIRATION RATE: 18 BRPM | DIASTOLIC BLOOD PRESSURE: 64 MMHG | TEMPERATURE: 98.3 F | SYSTOLIC BLOOD PRESSURE: 105 MMHG | BODY MASS INDEX: 27.96 KG/M2 | HEART RATE: 90 BPM

## 2022-03-22 DIAGNOSIS — E11.22 TYPE 2 DIABETES MELLITUS WITH STAGE 4 CHRONIC KIDNEY DISEASE, WITHOUT LONG-TERM CURRENT USE OF INSULIN (H): ICD-10-CM

## 2022-03-22 DIAGNOSIS — Z94.1 HEART REPLACED BY TRANSPLANT (H): ICD-10-CM

## 2022-03-22 DIAGNOSIS — I71.21 ASCENDING AORTIC ANEURYSM (H): ICD-10-CM

## 2022-03-22 DIAGNOSIS — L98.9 SKIN LESION: ICD-10-CM

## 2022-03-22 DIAGNOSIS — D84.9 IMMUNOSUPPRESSION (H): ICD-10-CM

## 2022-03-22 DIAGNOSIS — N18.4 TYPE 2 DIABETES MELLITUS WITH STAGE 4 CHRONIC KIDNEY DISEASE, WITHOUT LONG-TERM CURRENT USE OF INSULIN (H): ICD-10-CM

## 2022-03-22 DIAGNOSIS — R09.89 RUNNY NOSE: ICD-10-CM

## 2022-03-22 DIAGNOSIS — L72.0 EPIDERMAL CYST OF NECK: Primary | ICD-10-CM

## 2022-03-22 DIAGNOSIS — N25.81 SECONDARY RENAL HYPERPARATHYROIDISM (H): ICD-10-CM

## 2022-03-22 PROBLEM — G47.33 OSA ON CPAP: Status: RESOLVED | Noted: 2019-12-24 | Resolved: 2022-03-22

## 2022-03-22 LAB — HBA1C MFR BLD: 6.7 % (ref 0–5.6)

## 2022-03-22 PROCEDURE — 83036 HEMOGLOBIN GLYCOSYLATED A1C: CPT | Performed by: NURSE PRACTITIONER

## 2022-03-22 PROCEDURE — 99214 OFFICE O/P EST MOD 30 MIN: CPT | Performed by: NURSE PRACTITIONER

## 2022-03-22 PROCEDURE — 36415 COLL VENOUS BLD VENIPUNCTURE: CPT | Performed by: NURSE PRACTITIONER

## 2022-03-22 RX ORDER — FLUTICASONE PROPIONATE 50 MCG
SPRAY, SUSPENSION (ML) NASAL
Qty: 16 G | Refills: 11 | Status: SHIPPED | OUTPATIENT
Start: 2022-03-22 | End: 2023-02-10

## 2022-03-22 NOTE — PROGRESS NOTES
"  Assessment & Plan     Epidermal cyst of neck  Cyst has been present for couple years and evaluated multiple times and found to be benign.  However, with increase in size and pain we will obtain an ultrasound.  Depending on the findings we can refer to dermatology for excision.    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  Continues to have recurrent lesion that comes and goes.  Consider treatment with acyclovir.  Will recommend patient follow up with dermatology.      Type 2 diabetes mellitus with stage 4 chronic kidney disease, without long-term current use of insulin (H)  Previously well controlled on Metformin.  Will obtain an A1c.  Also advised patient to continue with dietary adjustments and continued activity in order to help reduce fasting glucose.  Will consider MTM referral if there is an increase in his A1c.  - Hemoglobin A1c; Future  - Hemoglobin A1c    Runny nose  Stable, requesting refills for allergy season.  - fluticasone (FLONASE) 50 MCG/ACT nasal spray; SHAKE LIQUID AND USE 1 SPRAY IN EACH NOSTRIL DAILY    Heart replaced by transplant (H)  Stable, follows with cardiology.    Immunosuppression (H)  Stable.  Follows with transplant    Ascending aortic aneurysm (H)  Stable, follows with cardiology.    Secondary renal hyperparathyroidism (H)  Follows with nephrology.  Stable.         BMI:   Estimated body mass index is 27.96 kg/m  as calculated from the following:    Height as of 12/23/21: 1.702 m (5' 7\").    Weight as of this encounter: 81 kg (178 lb 8 oz).   Weight management plan: Discussed healthy diet and exercise guidelines        Return for Follow up if symptoms worsen or fail to improve.    VERONICA Muse [facility]    Dino Gao is a 67 year old who presents for the following health issues     History of Present Illness       Reason for visit:  Bump or cyst on neck.  Symptom onset:  1-2 weeks " ago  Symptoms include:  Pain in a bump on my lower back of neck  Symptom intensity:  Moderate  Symptom progression:  Staying the same  Had these symptoms before:  Yes  Has tried/received treatment for these symptoms:  No  What makes it worse:  If I lay on it.  What makes it better:  Don t lay on it.    He eats 2-3 servings of fruits and vegetables daily.He consumes 1 sweetened beverage(s) daily.He exercises with enough effort to increase his heart rate 20 to 29 minutes per day.  He exercises with enough effort to increase his heart rate 5 days per week.   He is taking medications regularly.     Patient brought in notebook of blood pressure readings and glucose    Fell from a chair, noticed a lump in his neck after hitting it.  After a couple hours noticed swelling developed.  This has been ongoing for a couple years- has been evaluated by dermatology.  Somewhat painful and feels it doubled in last 2-3 weeks.        BP varies between 100-130's.  Glucose has gone up over last couple months, average 150's, previously less than 130.  Has been having some sweets during the day.  Has lost some weight, now below 180.    Has issue with lesions on right buttocks- has used valcyclovir in the past.  They come and go.  Would like to consider acyclovir. Does not recall if they were tested for HSV.            Review of Systems   Constitutional, HEENT, cardiovascular, pulmonary, gi and gu systems are negative, except as otherwise noted.      Objective    /64   Pulse 90   Temp 98.3  F (36.8  C) (Oral)   Resp 18   Wt 81 kg (178 lb 8 oz)   SpO2 99%   BMI 27.96 kg/m    Body mass index is 27.96 kg/m .  Physical Exam   GENERAL: healthy, alert and no distress  NECK: 3cm cyst on right posterior scalp line, does not appear infected.  RESP: unlabored, no audible wheezing  MS: no gross musculoskeletal defects noted, no edema

## 2022-03-22 NOTE — PATIENT INSTRUCTIONS
Schedule ultrasound of neck  To schedule with radiology, please call: [phone]    We will check an A1C today and may have you follow up with medication management for additional management.

## 2022-03-24 NOTE — TELEPHONE ENCOUNTER
Pt had not picked up My Chart message re upcoming annual cardiology appts. Reviewed dates. He will look online and touch base with questions.     Discussed Lamberto. Pt inquired if he could get in when he sees Dr Quijano in March. Will rote to renal team,    Enc to call with further questions.

## 2022-03-25 ENCOUNTER — ANCILLARY PROCEDURE (OUTPATIENT)
Dept: ULTRASOUND IMAGING | Facility: CLINIC | Age: 67
End: 2022-03-25
Attending: NURSE PRACTITIONER
Payer: MEDICARE

## 2022-03-25 DIAGNOSIS — L72.0 EPIDERMAL CYST OF NECK: ICD-10-CM

## 2022-03-25 PROCEDURE — 76536 US EXAM OF HEAD AND NECK: CPT | Mod: GC | Performed by: RADIOLOGY

## 2022-04-01 NOTE — TELEPHONE ENCOUNTER
Provided patient w/ phone number for Sanjuanaeld scheduling line. Patient's appointment w/ nephrology in May is a virtual visit, he will call and schedule Evusheld injections.

## 2022-04-03 DIAGNOSIS — Z94.0 KIDNEY REPLACED BY TRANSPLANT: Primary | ICD-10-CM

## 2022-04-04 RX ORDER — MYCOPHENOLATE MOFETIL 250 MG/1
750 CAPSULE ORAL 2 TIMES DAILY
Qty: 180 CAPSULE | Refills: 11 | Status: SHIPPED | OUTPATIENT
Start: 2022-04-04 | End: 2023-04-02

## 2022-04-08 ENCOUNTER — INFUSION THERAPY VISIT (OUTPATIENT)
Dept: NURSING | Facility: CLINIC | Age: 67
End: 2022-04-08
Payer: MEDICARE

## 2022-04-08 DIAGNOSIS — Z29.89 NEED FOR PROPHYLACTIC IMMUNOTHERAPY: Primary | ICD-10-CM

## 2022-04-08 PROCEDURE — M0220 PR INJECTION TIXAGEVIMAB & CILGAVIMAB (EVUSHELD): HCPCS | Performed by: INTERNAL MEDICINE

## 2022-04-08 NOTE — PROGRESS NOTES
Evusheld Consent   Confirmed patient received the Emergency Use Authorization Fact Sheet for Patients, Parents and Caregivers prior to receiving medication. We discussed the following risks and benefits of receiving EVUSHELD, as well as alternative treatments and the patient wished to proceed with EVUSHELD.     Providers believe the benefits of Evusheld outweigh the risks for all moderately to severely immunocompromised patients.      Benefits:   Evusheld is a synthetic antibody that provides protection against COVID-19 for 6 months and is recommended for patients that have a weakened immune system that may not be able to make antibodies themselves.     Risks:    There is a very small risk of allergic reactions and you should notify us if you have any symptoms of an allergic reaction after the injection. There were also some extremely rare cardiac events reported in the initial trials in people that already had heart disease, but experts do not think these were caused by the medication. We will observe you for any chest pain or trouble breathing after the injections and you can reach out to your provider if any of these symptoms develop.     Alternatives:   Vaccines to prevent COVID-19 are approved or available under Emergency Use Authorization. Use of EVUSHELD does not replace vaccination against COVID-19. You can continue to mask and isolate to avoid infections. It is your choice to receive or not receive EVUSHELD. Should you decide not to receive EVUSHELD, it will not change your standard medical care.     Patient does consent to the injection.    EVUSHELD Administration Note:  Murray Nicholson presents today for EVUSHELD.   present during visit today: Not Applicable.    Consent:   Informed Consent confirmed in chart, obtained on this date: 04/08/2022    Post Injection Assessment:   Patient tolerated injection without incident.      Patient was observed in the room for a minimum of 10 minutes after  injection per standard, then remained in the buidling for a total 60 minute observation period.         Discharge Plan:    Discharge instructions reviewed with: Patient.  Patient and/or family verbalized understanding of discharge instructions and all questions answered.  Patient discharged in stable condition accompanied by: self.     Lauren García RN

## 2022-04-12 ENCOUNTER — LAB (OUTPATIENT)
Dept: LAB | Facility: CLINIC | Age: 67
End: 2022-04-12
Payer: MEDICARE

## 2022-04-12 DIAGNOSIS — N18.4 TYPE 2 DIABETES MELLITUS WITH STAGE 4 CHRONIC KIDNEY DISEASE, WITHOUT LONG-TERM CURRENT USE OF INSULIN (H): Primary | ICD-10-CM

## 2022-04-12 DIAGNOSIS — E11.22 TYPE 2 DIABETES MELLITUS WITH STAGE 4 CHRONIC KIDNEY DISEASE, WITHOUT LONG-TERM CURRENT USE OF INSULIN (H): Primary | ICD-10-CM

## 2022-04-12 DIAGNOSIS — Z94.0 KIDNEY REPLACED BY TRANSPLANT: ICD-10-CM

## 2022-04-12 LAB
ANION GAP SERPL CALCULATED.3IONS-SCNC: 7 MMOL/L (ref 3–14)
BUN SERPL-MCNC: 16 MG/DL (ref 7–30)
CALCIUM SERPL-MCNC: 9.8 MG/DL (ref 8.5–10.1)
CHLORIDE BLD-SCNC: 105 MMOL/L (ref 94–109)
CO2 SERPL-SCNC: 28 MMOL/L (ref 20–32)
CREAT SERPL-MCNC: 1.04 MG/DL (ref 0.66–1.25)
CREAT UR-MCNC: 145 MG/DL
ERYTHROCYTE [DISTWIDTH] IN BLOOD BY AUTOMATED COUNT: 12.8 % (ref 10–15)
GFR SERPL CREATININE-BSD FRML MDRD: 79 ML/MIN/1.73M2
GLUCOSE BLD-MCNC: 239 MG/DL (ref 70–99)
HCT VFR BLD AUTO: 40.5 % (ref 40–53)
HGB BLD-MCNC: 13.4 G/DL (ref 13.3–17.7)
MCH RBC QN AUTO: 30.2 PG (ref 26.5–33)
MCHC RBC AUTO-ENTMCNC: 33.1 G/DL (ref 31.5–36.5)
MCV RBC AUTO: 91 FL (ref 78–100)
MICROALBUMIN UR-MCNC: 16 MG/L
MICROALBUMIN/CREAT UR: 11.03 MG/G CR (ref 0–17)
PLATELET # BLD AUTO: 198 10E3/UL (ref 150–450)
POTASSIUM BLD-SCNC: 4.2 MMOL/L (ref 3.4–5.3)
RBC # BLD AUTO: 4.44 10E6/UL (ref 4.4–5.9)
SODIUM SERPL-SCNC: 140 MMOL/L (ref 133–144)
TACROLIMUS BLD-MCNC: 6.1 UG/L (ref 5–15)
TME LAST DOSE: NORMAL H
TME LAST DOSE: NORMAL H
WBC # BLD AUTO: 4.4 10E3/UL (ref 4–11)

## 2022-04-12 PROCEDURE — 80048 BASIC METABOLIC PNL TOTAL CA: CPT | Performed by: PATHOLOGY

## 2022-04-12 PROCEDURE — 85027 COMPLETE CBC AUTOMATED: CPT | Performed by: PATHOLOGY

## 2022-04-12 PROCEDURE — 36415 COLL VENOUS BLD VENIPUNCTURE: CPT | Performed by: PATHOLOGY

## 2022-04-12 PROCEDURE — 82043 UR ALBUMIN QUANTITATIVE: CPT | Performed by: PATHOLOGY

## 2022-04-12 PROCEDURE — 80197 ASSAY OF TACROLIMUS: CPT | Performed by: INTERNAL MEDICINE

## 2022-04-14 ENCOUNTER — OFFICE VISIT (OUTPATIENT)
Dept: FAMILY MEDICINE | Facility: CLINIC | Age: 67
End: 2022-04-14
Payer: MEDICARE

## 2022-04-14 VITALS — DIASTOLIC BLOOD PRESSURE: 62 MMHG | SYSTOLIC BLOOD PRESSURE: 116 MMHG

## 2022-04-14 DIAGNOSIS — L72.0 EPIDERMAL CYST: ICD-10-CM

## 2022-04-14 PROCEDURE — 99202 OFFICE O/P NEW SF 15 MIN: CPT | Performed by: NURSE PRACTITIONER

## 2022-04-14 NOTE — LETTER
4/14/2022         RE: Murray Nicholson  [address]  [place] [address]        Dear Colleague,    Thank you for referring your patient, Murray Nicholson, to the [facility]. Please see a copy of my visit note below.    [facility] Dermatology Note  Encounter Date: Apr 14, 2022  Office Visit     Reviewed patients past medical history and pertinent chart review prior to patients visit today.     Dermatology Problem List:  1. cyst    ____________________________________________    Assessment & Plan:     # Cyst. Benign, no further treatment needed. Discussed excision if patient is bothered by the lesion due to irritation. Patient prefers to have the lesion removed and is aware of the risk of infection, scar, incomplete removal and recurrence. Photo taken for chart. Patient scheduled follow up with another provider for removal at their convenience at the end of appointment today.      Follow-up: for removal of cyst    Armida Hall, APRN CNP on 4/14/2022 at 8:33 AM   _______________________________________    CC: Cyst (Consult)    HPI:  Mr. Murray Nicholson is a(n) 67 year old male who presents today as a return patient for cyst on the back of his head. A few years ago he fell off a rolling chair and hit his head. He felt a bump there and it never went away. It has become tender and painful when he lays on the back of his head. He thinks it is growing. He had an ultrasound on 3/25/22 which showed cystic lesion suggestive of epidermal inclusion cyst.     Patient is otherwise feeling well, without additional skin concerns.      Physical Exam:  Vitals: /62   SKIN: Focused examination of scalp and neck was performed.  - subcutaneous 5 x 4 cm soft subcutaneous nodule at the base of the scalp at hairline with central punctum    - No other lesions of concern on areas examined.     Medications:  Current Outpatient Medications   Medication     amLODIPine  (NORVASC) 2.5 MG tablet     aspirin 81 MG EC tablet     atorvastatin (LIPITOR) 40 MG tablet     biotin 1000 MCG TABS tablet     blood glucose (ACCU-CHEK GUIDE) test strip     blood glucose monitoring (ACCU-CHEK FASTCLIX) lancets     calcium citrate and vitamin D (CITRACAL) 200-250 MG-UNIT TABS per tablet     famotidine (PEPCID) 20 MG tablet     fluticasone (FLONASE) 50 MCG/ACT nasal spray     loratadine (CLARITIN) 10 MG tablet     metFORMIN (GLUCOPHAGE-XR) 500 MG 24 hr tablet     multivitamin (CENTRUM SILVER) tablet     mycophenolate (GENERIC EQUIVALENT) 250 MG capsule     OMEPRAZOLE PO     sulfamethoxazole-trimethoprim (BACTRIM) 400-80 MG tablet     tacrolimus (GENERIC EQUIVALENT) 0.5 MG capsule     tacrolimus (GENERIC EQUIVALENT) 1 MG capsule     Zinc Sulfate (ZINC 15 PO)     No current facility-administered medications for this visit.     Facility-Administered Medications Ordered in Other Visits   Medication     diatrizoate meglumine-sodium (GASTROGRAFIN/GASTROVIEW) 66-10 % solution 480 mL      Past Medical History:   Patient Active Problem List   Diagnosis     Esophageal reflux     Allergic rhinitis     Anemia in chronic renal disease     Dyslipidemia     MGUS (monoclonal gammopathy of unknown significance)     Ascending aortic aneurysm (H)     Heart replaced by transplant (H)     Immunosuppression (H)     Type 2 diabetes mellitus with stage 4 chronic kidney disease, without long-term current use of insulin (H)     Pancreatic cyst     Kidney replaced by transplant     Aftercare following organ transplant     HTN, kidney transplant related     Secondary renal hyperparathyroidism (H)     Vitamin D deficiency     Need for pneumocystis prophylaxis     Erectile dysfunction, unspecified erectile dysfunction type     Nocturia     History of hernia repair     Decreased hearing, unspecified laterality     Past Medical History:   Diagnosis Date     (HFpEF) heart failure with preserved ejection fraction (H)      Allergic  rhinitis, cause unspecified      Anemia of chronic kidney failure      Aortic stenosis 8/13/2008    Overview:  Bicuspid aortic valve     AS (aortic stenosis)      Ascending aortic aneurysm (H)      Bicuspid aortic valve      Bilateral hand pain 6/1/2021     CAD (coronary artery disease)      Congestive heart failure, unspecified      Coronary artery disease involving native artery of transplanted heart without angina pectoris 7/10/2014     Dialysis patient (H)     Tues-Thur-Sat     Dyslipidemia      Esophageal reflux      ESRD (end stage renal disease) (H)      Hearing problem      Heart replaced by transplant (H) 12/10/2018     Hypersomnia with sleep apnea, unspecified      Hypertension      Ileostomy status (H)      Immunosuppression (H)      MGUS (monoclonal gammopathy of unknown significance)      Mitral regurgitation      SHEELA (obstructive sleep apnea)     No CPAP     Pneumonia      Sigmoid diverticulitis 8/14/2018     Status post coronary angiogram 6/28/2019     Systolic heart failure (H)      Type 2 diabetes mellitus (H)            CC Alicia Shearer APRN CNP  [address] [place] [address] on close of this encounter.      Again, thank you for allowing me to participate in the care of your patient.        Sincerely,        Armida Hall, APRN CNP

## 2022-04-14 NOTE — PROGRESS NOTES
- Blood work today     - Smaller more frequent meals, Lean meats (chicken, turkey, fish), peas, nuts, seeds, green leafy vegetables     - Schedule CT scan     - Reduce pantoprazole to every other day for 2 weeks     - Start famotidine on opposite days     - Start Peppermint oil before meals     - Start Benefiber daily     - Adequate hydration    [facility] Dermatology Note  Encounter Date: Apr 14, 2022  Office Visit     Reviewed patients past medical history and pertinent chart review prior to patients visit today.     Dermatology Problem List:  1. cyst    ____________________________________________    Assessment & Plan:     # Cyst. Benign, no further treatment needed. Discussed excision if patient is bothered by the lesion due to irritation. Patient prefers to have the lesion removed and is aware of the risk of infection, scar, incomplete removal and recurrence. Photo taken for chart. Patient scheduled follow up with another provider for removal at their convenience at the end of appointment today.      Follow-up: for removal of cyst    Armida Hall, VERONICA CNP on 4/14/2022 at 8:33 AM   _______________________________________    CC: Cyst (Consult)    HPI:  Mr. Murray Nicholson is a(n) 67 year old male who presents today as a return patient for cyst on the back of his head. A few years ago he fell off a rolling chair and hit his head. He felt a bump there and it never went away. It has become tender and painful when he lays on the back of his head. He thinks it is growing. He had an ultrasound on 3/25/22 which showed cystic lesion suggestive of epidermal inclusion cyst.     Patient is otherwise feeling well, without additional skin concerns.      Physical Exam:  Vitals: /62   SKIN: Focused examination of scalp and neck was performed.  - subcutaneous 5 x 4 cm soft subcutaneous nodule at the base of the scalp at hairline with central punctum    - No other lesions of concern on areas examined.     Medications:  Current Outpatient Medications   Medication     amLODIPine (NORVASC) 2.5 MG tablet     aspirin 81 MG EC tablet     atorvastatin (LIPITOR) 40 MG tablet     biotin 1000 MCG TABS tablet     blood glucose (ACCU-CHEK GUIDE) test strip     blood glucose monitoring (ACCU-CHEK FASTCLIX) lancets     calcium citrate and vitamin D  (CITRACAL) 200-250 MG-UNIT TABS per tablet     famotidine (PEPCID) 20 MG tablet     fluticasone (FLONASE) 50 MCG/ACT nasal spray     loratadine (CLARITIN) 10 MG tablet     metFORMIN (GLUCOPHAGE-XR) 500 MG 24 hr tablet     multivitamin (CENTRUM SILVER) tablet     mycophenolate (GENERIC EQUIVALENT) 250 MG capsule     OMEPRAZOLE PO     sulfamethoxazole-trimethoprim (BACTRIM) 400-80 MG tablet     tacrolimus (GENERIC EQUIVALENT) 0.5 MG capsule     tacrolimus (GENERIC EQUIVALENT) 1 MG capsule     Zinc Sulfate (ZINC 15 PO)     No current facility-administered medications for this visit.     Facility-Administered Medications Ordered in Other Visits   Medication     diatrizoate meglumine-sodium (GASTROGRAFIN/GASTROVIEW) 66-10 % solution 480 mL      Past Medical History:   Patient Active Problem List   Diagnosis     Esophageal reflux     Allergic rhinitis     Anemia in chronic renal disease     Dyslipidemia     MGUS (monoclonal gammopathy of unknown significance)     Ascending aortic aneurysm (H)     Heart replaced by transplant (H)     Immunosuppression (H)     Type 2 diabetes mellitus with stage 4 chronic kidney disease, without long-term current use of insulin (H)     Pancreatic cyst     Kidney replaced by transplant     Aftercare following organ transplant     HTN, kidney transplant related     Secondary renal hyperparathyroidism (H)     Vitamin D deficiency     Need for pneumocystis prophylaxis     Erectile dysfunction, unspecified erectile dysfunction type     Nocturia     History of hernia repair     Decreased hearing, unspecified laterality     Past Medical History:   Diagnosis Date     (HFpEF) heart failure with preserved ejection fraction (H)      Allergic rhinitis, cause unspecified      Anemia of chronic kidney failure      Aortic stenosis 8/13/2008    Overview:  Bicuspid aortic valve     AS (aortic stenosis)      Ascending aortic aneurysm (H)      Bicuspid aortic valve      Bilateral hand pain 6/1/2021     CAD  (coronary artery disease)      Congestive heart failure, unspecified      Coronary artery disease involving native artery of transplanted heart without angina pectoris 7/10/2014     Dialysis patient (H)     Tues-Thur-Sat     Dyslipidemia      Esophageal reflux      ESRD (end stage renal disease) (H)      Hearing problem      Heart replaced by transplant (H) 12/10/2018     Hypersomnia with sleep apnea, unspecified      Hypertension      Ileostomy status (H)      Immunosuppression (H)      MGUS (monoclonal gammopathy of unknown significance)      Mitral regurgitation      SHEELA (obstructive sleep apnea)     No CPAP     Pneumonia      Sigmoid diverticulitis 8/14/2018     Status post coronary angiogram 6/28/2019     Systolic heart failure (H)      Type 2 diabetes mellitus (H)            CC Alicia Shearer, APRN CNP  [address] [place] [address] on close of this encounter.

## 2022-05-09 ENCOUNTER — PREP FOR PROCEDURE (OUTPATIENT)
Dept: SURGERY | Facility: CLINIC | Age: 67
End: 2022-05-09
Payer: MEDICARE

## 2022-05-09 DIAGNOSIS — K43.9 VENTRAL HERNIA: Primary | ICD-10-CM

## 2022-05-09 DIAGNOSIS — K43.9 VENTRAL HERNIA WITHOUT OBSTRUCTION OR GANGRENE: Primary | ICD-10-CM

## 2022-05-09 RX ORDER — CLINDAMYCIN PHOSPHATE 900 MG/50ML
900 INJECTION, SOLUTION INTRAVENOUS
Status: CANCELLED | OUTPATIENT
Start: 2022-05-09

## 2022-05-09 RX ORDER — CLINDAMYCIN PHOSPHATE 900 MG/50ML
900 INJECTION, SOLUTION INTRAVENOUS SEE ADMIN INSTRUCTIONS
Status: CANCELLED | OUTPATIENT
Start: 2022-05-09

## 2022-05-10 ENCOUNTER — LAB (OUTPATIENT)
Dept: LAB | Facility: CLINIC | Age: 67
End: 2022-05-10
Payer: MEDICARE

## 2022-05-10 DIAGNOSIS — Z94.0 KIDNEY REPLACED BY TRANSPLANT: ICD-10-CM

## 2022-05-10 LAB
ANION GAP SERPL CALCULATED.3IONS-SCNC: 6 MMOL/L (ref 3–14)
BUN SERPL-MCNC: 18 MG/DL (ref 7–30)
CALCIUM SERPL-MCNC: 9.7 MG/DL (ref 8.5–10.1)
CHLORIDE BLD-SCNC: 104 MMOL/L (ref 94–109)
CO2 SERPL-SCNC: 28 MMOL/L (ref 20–32)
CREAT SERPL-MCNC: 0.99 MG/DL (ref 0.66–1.25)
CREAT UR-MCNC: 144 MG/DL
ERYTHROCYTE [DISTWIDTH] IN BLOOD BY AUTOMATED COUNT: 12.6 % (ref 10–15)
GFR SERPL CREATININE-BSD FRML MDRD: 83 ML/MIN/1.73M2
GLUCOSE BLD-MCNC: 231 MG/DL (ref 70–99)
HCT VFR BLD AUTO: 39.9 % (ref 40–53)
HGB BLD-MCNC: 12.8 G/DL (ref 13.3–17.7)
MCH RBC QN AUTO: 29.4 PG (ref 26.5–33)
MCHC RBC AUTO-ENTMCNC: 32.1 G/DL (ref 31.5–36.5)
MCV RBC AUTO: 92 FL (ref 78–100)
PLATELET # BLD AUTO: 195 10E3/UL (ref 150–450)
POTASSIUM BLD-SCNC: 4.1 MMOL/L (ref 3.4–5.3)
PROT UR-MCNC: 0.2 G/L
PROT/CREAT 24H UR: 0.14 G/G CR (ref 0–0.2)
RBC # BLD AUTO: 4.35 10E6/UL (ref 4.4–5.9)
SODIUM SERPL-SCNC: 138 MMOL/L (ref 133–144)
TACROLIMUS BLD-MCNC: 4.5 UG/L (ref 5–15)
TME LAST DOSE: ABNORMAL H
TME LAST DOSE: ABNORMAL H
WBC # BLD AUTO: 4.4 10E3/UL (ref 4–11)

## 2022-05-10 PROCEDURE — 86833 HLA CLASS II HIGH DEFIN QUAL: CPT | Performed by: INTERNAL MEDICINE

## 2022-05-10 PROCEDURE — 36415 COLL VENOUS BLD VENIPUNCTURE: CPT | Performed by: PATHOLOGY

## 2022-05-10 PROCEDURE — 80048 BASIC METABOLIC PNL TOTAL CA: CPT | Performed by: PATHOLOGY

## 2022-05-10 PROCEDURE — 80197 ASSAY OF TACROLIMUS: CPT | Performed by: INTERNAL MEDICINE

## 2022-05-10 PROCEDURE — 86832 HLA CLASS I HIGH DEFIN QUAL: CPT | Performed by: INTERNAL MEDICINE

## 2022-05-10 PROCEDURE — 85027 COMPLETE CBC AUTOMATED: CPT | Performed by: PATHOLOGY

## 2022-05-10 PROCEDURE — 84156 ASSAY OF PROTEIN URINE: CPT | Performed by: PATHOLOGY

## 2022-05-11 ENCOUNTER — TELEPHONE (OUTPATIENT)
Dept: SURGERY | Facility: CLINIC | Age: 67
End: 2022-05-11
Payer: MEDICARE

## 2022-05-11 NOTE — TELEPHONE ENCOUNTER
Contacted patient regarding scheduling surgery with Dr. Curtis. This writer informed patient I am waiting to confirm which day Dr. Curtis will be at [facility] in June. This will be determined in clinic 5/12/22. This writer informed patient after this is determined I will call him to schedule surgery. Patient expressed appreciation and understanding.

## 2022-05-13 ENCOUNTER — TELEPHONE (OUTPATIENT)
Dept: SURGERY | Facility: CLINIC | Age: 67
End: 2022-05-13
Payer: MEDICARE

## 2022-05-13 NOTE — TELEPHONE ENCOUNTER
Received call from patient regarding scheduling surgery. This writer informed patient that we are tentatively aiming to schedule 6/24/22. This writer informed patient I will reach out to him directly to confirm. Patient expressed understanding.

## 2022-05-16 ENCOUNTER — LAB REQUISITION (OUTPATIENT)
Dept: LAB | Facility: CLINIC | Age: 67
End: 2022-05-16
Payer: MEDICARE

## 2022-05-16 ENCOUNTER — VIRTUAL VISIT (OUTPATIENT)
Dept: NEPHROLOGY | Facility: CLINIC | Age: 67
End: 2022-05-16
Attending: INTERNAL MEDICINE
Payer: MEDICARE

## 2022-05-16 DIAGNOSIS — D49.0 IPMN (INTRADUCTAL PAPILLARY MUCINOUS NEOPLASM): Primary | ICD-10-CM

## 2022-05-16 DIAGNOSIS — Z94.1 HEART REPLACED BY TRANSPLANT (H): ICD-10-CM

## 2022-05-16 DIAGNOSIS — I15.1 HTN, KIDNEY TRANSPLANT RELATED: ICD-10-CM

## 2022-05-16 DIAGNOSIS — D47.2 MGUS (MONOCLONAL GAMMOPATHY OF UNKNOWN SIGNIFICANCE): ICD-10-CM

## 2022-05-16 DIAGNOSIS — Z48.298 AFTERCARE FOLLOWING ORGAN TRANSPLANT: ICD-10-CM

## 2022-05-16 DIAGNOSIS — Z94.0 KIDNEY REPLACED BY TRANSPLANT: ICD-10-CM

## 2022-05-16 DIAGNOSIS — Z94.0 HTN, KIDNEY TRANSPLANT RELATED: ICD-10-CM

## 2022-05-16 DIAGNOSIS — E55.9 VITAMIN D DEFICIENCY: ICD-10-CM

## 2022-05-16 DIAGNOSIS — Z29.89 NEED FOR PNEUMOCYSTIS PROPHYLAXIS: ICD-10-CM

## 2022-05-16 DIAGNOSIS — D84.9 IMMUNOSUPPRESSION (H): ICD-10-CM

## 2022-05-16 PROCEDURE — G0463 HOSPITAL OUTPT CLINIC VISIT: HCPCS | Mod: PN,RTG | Performed by: INTERNAL MEDICINE

## 2022-05-16 PROCEDURE — 99214 OFFICE O/P EST MOD 30 MIN: CPT | Mod: 95 | Performed by: INTERNAL MEDICINE

## 2022-05-16 NOTE — LETTER
5/16/2022      RE: Murray Nicholson  [address]  [place] [address]       Murray is a 67 year old who is being evaluated via a billable video visit.      How would you like to obtain your AVS? MyChart  If the video visit is dropped, the invitation should be resent by: Send to e-mail at: [email or web address]  Will anyone else be joining your video visit? No    Video Start Time: 1355  Video-Visit Details    Type of service:  Video Visit    Video End Time:1415    Originating Location (pt. Location): Home    Distant Location (provider location):  [facility]ROLOG[facility] [place]     Platform used for Video Visit: [facility]      TRANSPLANT NEPHROLOGY CHRONIC POST TRANSPLANT VISIT    Assessment & Plan   # LDKT: Stable   - Baseline Creatinine:  ~ 1.0-1.2   - Proteinuria: Normal (<0.2 grams)   - Date DSA Last Checked: Jan/2022      Latest DSA: No   - BK Viremia: No   - Kidney Tx Biopsy: No    # Heart Tx: Patient appears to be stable.  Followed by Cardiology.    # Immunosuppression: Tacrolimus immediate release (goal 6-8) and Mycophenolate mofetil (dose 750 mg every 12 hours)   - Continue with intensive monitoring of immunosuppression for efficacy and toxicity.   - Patient is on higher tacrolimus goal due to Heart Transplant guidelines.   - Changes: No    # Infection Prophylaxis:   - PJP: Sulfa/TMP (Bactrim)    # Hypertension: Controlled;  Goal BP: < 130/80   - Changes: No    # Diabetes: Controlled (HbA1c <7%) Last HbA1c: 6.7%   - Management as per primary care.    # Anemia in Chronic Renal Disease: Hgb: Stable, near normal      ANASTASIYA: No   - Iron studies: Not checked recently    # Mineral Bone Disorder:   - Vitamin D; level: Normal        On supplement: Yes  - Calcium; level: Normal        On supplement: Yes    # PAD: No claudication symptoms.    # MGUS: Stable monoclonal IgG immunoglobulin of kappa light chain type with peak of ~ 0.3 with last check 11/2020.   - Will recheck SPEP yearly, next due  3/2023.    # GERD: Controlled on omeprazole.    # Ventral Hernia: Patient is supposed to have surgical repair done in the near future.    # Pancreatic Cyst: Likely side branch IPMN with negative biopsy and recommended follow up MRI in 1 year.   - Will refer patient to GI for further management.    # Skin Cancer Risk:    - Discussed sun protection and recommend regular follow up with Dermatology.    # Medical Compliance: Yes    # COVID-19 Virus Review: Discussed COVID-19 virus and the potential medical risks.  Reviewed preventative health recommendations, including wearing a mask where appropriate.  Recommended COVID vaccination should be up to date with either an initial vaccination or booster shot when appropriate.  Asked the patient to inform the transplant center if they are exposed or diagnosed with this virus.    # COVID Vaccination Up To Date: Yes    # Transplant History:  Etiology of Kidney Failure: Diabetes mellitus type 2  Tx: LDKT and Heart Tx  Transplant: 12/19/2019 (Kidney), 6/14/2018 (Heart)  Significant changes in immunosuppression: None  Significant transplant-related complications: None    Transplant Office Phone Number: [phone]    Assessment and plan was discussed with the patient and he voiced his understanding and agreement.    Return visit: Return in about 6 months (around 11/16/2022).    Giovany Quijano MD    Chief Complaint   Mr. Nicholson is a 67 year old here for kidney transplant and immunosuppression management.    History of Present Illness    Mr. Nicholson reports feeling good overall with some medical complaints.  Since last clinic visit, patient reports no hospitalizations or new medical complaints and has been doing well overall.  His energy level has been good and remains pretty normal.  He is active, continuing to work about 3 days a week, as well as getting some exercise.  Denies any chest pain or shortness of breath with exertion.  No leg swelling.  He does feel his left  upper extremity AV graft seems to have decreased flow and at times feels a bit different.  There is still a palpable pulse there and no pain.  No pain, tingling, numbness or weakness in his arm or hand.  Just feels different at times.    Appetite is good and his weight is up a few pounds.  No nausea, vomiting or diarrhea.  No heartburn symptoms as long as he takes omeprazole, which was better than famotidine.  No fever, sweats or chills.  No night sweats.  Patient does have a ventral hernia, with the plan to have surgery to repair that sometime next month.    Home BP: 110-120/70-80s    Problem List   Patient Active Problem List   Diagnosis     Esophageal reflux     Allergic rhinitis     Anemia in chronic renal disease     Dyslipidemia     MGUS (monoclonal gammopathy of unknown significance)     Ascending aortic aneurysm (H)     Heart replaced by transplant (H)     Immunosuppression (H)     Type 2 diabetes mellitus with stage 4 chronic kidney disease, without long-term current use of insulin (H)     Pancreatic cyst     Kidney replaced by transplant     Aftercare following organ transplant     HTN, kidney transplant related     Secondary renal hyperparathyroidism (H)     Vitamin D deficiency     Need for pneumocystis prophylaxis     Erectile dysfunction, unspecified erectile dysfunction type     Nocturia     History of hernia repair     Decreased hearing, unspecified laterality       Allergies   Allergies   Allergen Reactions     Norco [Hydrocodone-Acetaminophen] Nausea and Vomiting     Cats      Throat tightness     Isosorbide Other (See Comments)     hypotension     Penicillins Hives     Seasonal Allergies      rhinitis     Shrimp      Throat closes        Medications   Current Outpatient Medications   Medication Sig     amLODIPine (NORVASC) 2.5 MG tablet Take 2 tablets (5 mg) by mouth daily     aspirin 81 MG EC tablet Take 81 mg by mouth daily     atorvastatin (LIPITOR) 40 MG tablet TAKE 1 TABLET(40 MG) BY MOUTH  DAILY     biotin 1000 MCG TABS tablet Take 1,000 mcg by mouth daily Patient takes every other day     blood glucose (ACCU-CHEK GUIDE) test strip Use to test blood sugar 3 times daily or as directed.     blood glucose monitoring (ACCU-CHEK FASTCLIX) lancets USE TO TEST 3 TIMES DAILY OR AS DIRECTED.     calcium citrate and vitamin D (CITRACAL) 200-250 MG-UNIT TABS per tablet Take 1 tablet by mouth 2 times daily     fluticasone (FLONASE) 50 MCG/ACT nasal spray SHAKE LIQUID AND USE 1 SPRAY IN EACH NOSTRIL DAILY     loratadine (CLARITIN) 10 MG tablet Take 10 mg by mouth daily Patient takes at bedtime     metFORMIN (GLUCOPHAGE-XR) 500 MG 24 hr tablet Take 4 tablets (2,000 mg) by mouth daily (with breakfast)     multivitamin (CENTRUM SILVER) tablet Take 1 tablet by mouth daily     mycophenolate (GENERIC EQUIVALENT) 250 MG capsule Take 3 capsules (750 mg) by mouth 2 times daily     OMEPRAZOLE PO Take 20 mg by mouth daily     sulfamethoxazole-trimethoprim (BACTRIM) 400-80 MG tablet Take 1 tablet by mouth daily     tacrolimus (GENERIC EQUIVALENT) 0.5 MG capsule Take ONE cap every PM (0.5 mg every evening)     tacrolimus (GENERIC EQUIVALENT) 1 MG capsule TAKE 1 CAPSULE BY MOUTH EVERY MORNING     Zinc Sulfate (ZINC 15 PO)      No current facility-administered medications for this visit.     Facility-Administered Medications Ordered in Other Visits   Medication     diatrizoate meglumine-sodium (GASTROGRAFIN/GASTROVIEW) 66-10 % solution 480 mL     Medications Discontinued During This Encounter   Medication Reason     famotidine (PEPCID) 20 MG tablet        Physical Exam   Vital Signs: Deferred for this telemedicine visit.    GENERAL APPEARANCE: alert and no distress  HENT: no obvious abnormalities on appearance  RESP: breathing appears unremarkable with normal rate, no audible wheezing or cough and no apparent shortness of breath with conversation  MS: extremities normal - no gross deformities noted  SKIN: no apparent rash and  normal skin tone  NEURO: speech is clear with no obvious neurological deficits  PSYCH: mentation appears normal and affect normal    Data     Renal Latest Ref Rng & Units 5/10/2022 4/12/2022 3/8/2022   Na 133 - 144 mmol/L 138 140 139   K 3.4 - 5.3 mmol/L 4.1 4.2 4.0   Cl 94 - 109 mmol/L 104 105 103   CO2 20 - 32 mmol/L 28 28 26   BUN 7 - 30 mg/dL 18 16 21   Cr 0.66 - 1.25 mg/dL 0.99 1.04 1.12   Glucose 70 - 99 mg/dL 231(H) 239(H) 205(H)   Ca  8.5 - 10.1 mg/dL 9.7 9.8 10.1   Mg 1.6 - 2.3 mg/dL - - -     Bone Health Latest Ref Rng & Units 10/12/2021 9/7/2021 6/7/2021   Phos 2.5 - 4.5 mg/dL 2.7 - 3.1   PTHi 18 - 80 pg/mL - - -   Vit D Def 20 - 75 ug/L - 38 -     Heme Latest Ref Rng & Units 5/10/2022 4/12/2022 3/8/2022   WBC 4.0 - 11.0 10e3/uL 4.4 4.4 4.0   Hgb 13.3 - 17.7 g/dL 12.8(L) 13.4 13.0(L)   Plt 150 - 450 10e3/uL 195 198 195   ABSOLUTE NEUTROPHIL 1.6 - 8.3 10e9/L - - -   ABSOLUTE LYMPHOCYTES 0.8 - 5.3 10e9/L - - -   ABSOLUTE MONOCYTES 0.0 - 1.3 10e9/L - - -   ABSOLUTE EOSINOPHILS 0.0 - 0.7 10e9/L - - -   ABSOLUTE BASOPHILS 0.0 - 0.2 10e9/L - - -   ABS IMMATURE GRANULOCYTES 0 - 0.4 10e9/L - - -   ABSOLUTE NUCLEATED RBC - - - -     Liver Latest Ref Rng & Units 6/7/2021 12/9/2020 7/15/2020   AP 40 - 150 U/L 101 136 157(H)   TBili 0.2 - 1.3 mg/dL 1.6(H) 1.1 1.0   DBili 0.0 - 0.2 mg/dL - - -   ALT 0 - 70 U/L 28 36 34   AST 0 - 45 U/L 27 22 20   Tot Protein 6.8 - 8.8 g/dL 7.3 7.3 7.5   Albumin 3.4 - 5.0 g/dL 3.8 3.8 3.9     Pancreas Latest Ref Rng & Units 3/22/2022 1/11/2022 6/7/2021   A1C 0.0 - 5.6 % 6.7(H) 6.9(H) 6.2(H)   Amylase 30 - 110 U/L - - -     Iron studies Latest Ref Rng & Units 12/10/2019 6/10/2019 7/10/2018   Iron 35 - 180 ug/dL 84 118 66   Iron sat 15 - 46 % 35 47(H) 28   Ferritin 26 - 388 ng/mL 445(H) 644(H) 771(H)     [facility] Txp Virology Latest Ref Rng & Units 7/6/2021 6/10/2021 6/7/2021   CVM DNA Quant - - - Plasma   CMV QUANT IU/ML CMVND:CMV DNA Not Detected [IU]/mL - - CMV DNA Not Detected   LOG IU/ML  OF CMVQNT <2.1 [Log:IU]/mL - - Not Calculated   BK Spec - Plasma Plasma -   BK Res BKNEG:BK Virus DNA Not Detected copies/mL BK Virus DNA Not Detected BK Virus DNA Not Detected -   BK Log <2.7 Log copies/mL Not Calculated Not Calculated -   EBV CAPSID ANTIBODY IGG 0.0 - 0.8 AI - - -   EBV DNA COPIES/ML EBVNEG:EBV DNA Not Detected [Copies]/mL - - EBV DNA Not Detected   EBV DNA LOG OF COPIES <2.7 [Log:copies]/mL - - Not Calculated   Hep B Core NR:Nonreactive - - -        Recent Labs   Lab Test 03/08/22  0942 04/12/22  0934 05/10/22  0959   DOSTAC 3/7/2022 4/11/2022 5/9/2022   TACROL 7.0 6.1 4.5*     Recent Labs   Lab Test 12/26/19  0720 01/27/20  0918 02/03/20  0948   DOSMPA Not Provided NTP 02/02/20 0915pm   MPACID 1.65 2.39 2.41   MPAG 58.9 54.6 50.4       Giovany Quijano MD

## 2022-05-16 NOTE — PROGRESS NOTES
Murray is a 67 year old who is being evaluated via a billable video visit.      How would you like to obtain your AVS? MyChart  If the video visit is dropped, the invitation should be resent by: Send to e-mail at: [email or web address]  Will anyone else be joining your video visit? No    Video Start Time: 1355  Video-Visit Details    Type of service:  Video Visit    Video End Time:1415    Originating Location (pt. Location): Home    Distant Location (provider location):  [facility] NEPHROLOGY CLINIC [place]     Platform used for Video Visit: Well      TRANSPLANT NEPHROLOGY CHRONIC POST TRANSPLANT VISIT    Assessment & Plan   # LDKT: Stable   - Baseline Creatinine:  ~ 1.0-1.2   - Proteinuria: Normal (<0.2 grams)   - Date DSA Last Checked: Jan/2022      Latest DSA: No   - BK Viremia: No   - Kidney Tx Biopsy: No    # Heart Tx: Patient appears to be stable.  Followed by Cardiology.    # Immunosuppression: Tacrolimus immediate release (goal 6-8) and Mycophenolate mofetil (dose 750 mg every 12 hours)   - Continue with intensive monitoring of immunosuppression for efficacy and toxicity.   - Patient is on higher tacrolimus goal due to Heart Transplant guidelines.   - Changes: No    # Infection Prophylaxis:   - PJP: Sulfa/TMP (Bactrim)    # Hypertension: Controlled;  Goal BP: < 130/80   - Changes: No    # Diabetes: Controlled (HbA1c <7%) Last HbA1c: 6.7%   - Management as per primary care.    # Anemia in Chronic Renal Disease: Hgb: Stable, near normal      ANASTASIYA: No   - Iron studies: Not checked recently    # Mineral Bone Disorder:   - Vitamin D; level: Normal        On supplement: Yes  - Calcium; level: Normal        On supplement: Yes    # PAD: No claudication symptoms.    # MGUS: Stable monoclonal IgG immunoglobulin of kappa light chain type with peak of ~ 0.3 with last check 11/2020.   - Will recheck SPEP yearly, next due 3/2023.    # GERD: Controlled on omeprazole.    # Ventral Hernia: Patient is supposed to  have surgical repair done in the near future.    # Pancreatic Cyst: Likely side branch IPMN with negative biopsy and recommended follow up MRI in 1 year.   - Will refer patient to GI for further management.    # Skin Cancer Risk:    - Discussed sun protection and recommend regular follow up with Dermatology.    # Medical Compliance: Yes    # COVID-19 Virus Review: Discussed COVID-19 virus and the potential medical risks.  Reviewed preventative health recommendations, including wearing a mask where appropriate.  Recommended COVID vaccination should be up to date with either an initial vaccination or booster shot when appropriate.  Asked the patient to inform the transplant center if they are exposed or diagnosed with this virus.    # COVID Vaccination Up To Date: Yes    # Transplant History:  Etiology of Kidney Failure: Diabetes mellitus type 2  Tx: LDKT and Heart Tx  Transplant: 12/19/2019 (Kidney), 6/14/2018 (Heart)  Significant changes in immunosuppression: None  Significant transplant-related complications: None    Transplant Office Phone Number: [phone]    Assessment and plan was discussed with the patient and he voiced his understanding and agreement.    Return visit: Return in about 6 months (around 11/16/2022).    Giovany Quijano MD    Chief Complaint   Mr. Nicholson is a 67 year old here for kidney transplant and immunosuppression management.    History of Present Illness    Mr. Nicholson reports feeling good overall with some medical complaints.  Since last clinic visit, patient reports no hospitalizations or new medical complaints and has been doing well overall.  His energy level has been good and remains pretty normal.  He is active, continuing to work about 3 days a week, as well as getting some exercise.  Denies any chest pain or shortness of breath with exertion.  No leg swelling.  He does feel his left upper extremity AV graft seems to have decreased flow and at times feels a bit different.   There is still a palpable pulse there and no pain.  No pain, tingling, numbness or weakness in his arm or hand.  Just feels different at times.    Appetite is good and his weight is up a few pounds.  No nausea, vomiting or diarrhea.  No heartburn symptoms as long as he takes omeprazole, which was better than famotidine.  No fever, sweats or chills.  No night sweats.  Patient does have a ventral hernia, with the plan to have surgery to repair that sometime next month.    Home BP: 110-120/70-80s    Problem List   Patient Active Problem List   Diagnosis     Esophageal reflux     Allergic rhinitis     Anemia in chronic renal disease     Dyslipidemia     MGUS (monoclonal gammopathy of unknown significance)     Ascending aortic aneurysm (H)     Heart replaced by transplant (H)     Immunosuppression (H)     Type 2 diabetes mellitus with stage 4 chronic kidney disease, without long-term current use of insulin (H)     Pancreatic cyst     Kidney replaced by transplant     Aftercare following organ transplant     HTN, kidney transplant related     Secondary renal hyperparathyroidism (H)     Vitamin D deficiency     Need for pneumocystis prophylaxis     Erectile dysfunction, unspecified erectile dysfunction type     Nocturia     History of hernia repair     Decreased hearing, unspecified laterality       Allergies   Allergies   Allergen Reactions     Norco [Hydrocodone-Acetaminophen] Nausea and Vomiting     Cats      Throat tightness     Isosorbide Other (See Comments)     hypotension     Penicillins Hives     Seasonal Allergies      rhinitis     Shrimp      Throat closes        Medications   Current Outpatient Medications   Medication Sig     amLODIPine (NORVASC) 2.5 MG tablet Take 2 tablets (5 mg) by mouth daily     aspirin 81 MG EC tablet Take 81 mg by mouth daily     atorvastatin (LIPITOR) 40 MG tablet TAKE 1 TABLET(40 MG) BY MOUTH DAILY     biotin 1000 MCG TABS tablet Take 1,000 mcg by mouth daily Patient takes every other  day     blood glucose (ACCU-CHEK GUIDE) test strip Use to test blood sugar 3 times daily or as directed.     blood glucose monitoring (ACCU-CHEK FASTCLIX) lancets USE TO TEST 3 TIMES DAILY OR AS DIRECTED.     calcium citrate and vitamin D (CITRACAL) 200-250 MG-UNIT TABS per tablet Take 1 tablet by mouth 2 times daily     fluticasone (FLONASE) 50 MCG/ACT nasal spray SHAKE LIQUID AND USE 1 SPRAY IN EACH NOSTRIL DAILY     loratadine (CLARITIN) 10 MG tablet Take 10 mg by mouth daily Patient takes at bedtime     metFORMIN (GLUCOPHAGE-XR) 500 MG 24 hr tablet Take 4 tablets (2,000 mg) by mouth daily (with breakfast)     multivitamin (CENTRUM SILVER) tablet Take 1 tablet by mouth daily     mycophenolate (GENERIC EQUIVALENT) 250 MG capsule Take 3 capsules (750 mg) by mouth 2 times daily     OMEPRAZOLE PO Take 20 mg by mouth daily     sulfamethoxazole-trimethoprim (BACTRIM) 400-80 MG tablet Take 1 tablet by mouth daily     tacrolimus (GENERIC EQUIVALENT) 0.5 MG capsule Take ONE cap every PM (0.5 mg every evening)     tacrolimus (GENERIC EQUIVALENT) 1 MG capsule TAKE 1 CAPSULE BY MOUTH EVERY MORNING     Zinc Sulfate (ZINC 15 PO)      No current facility-administered medications for this visit.     Facility-Administered Medications Ordered in Other Visits   Medication     diatrizoate meglumine-sodium (GASTROGRAFIN/GASTROVIEW) 66-10 % solution 480 mL     Medications Discontinued During This Encounter   Medication Reason     famotidine (PEPCID) 20 MG tablet        Physical Exam   Vital Signs: Deferred for this telemedicine visit.    GENERAL APPEARANCE: alert and no distress  HENT: no obvious abnormalities on appearance  RESP: breathing appears unremarkable with normal rate, no audible wheezing or cough and no apparent shortness of breath with conversation  MS: extremities normal - no gross deformities noted  SKIN: no apparent rash and normal skin tone  NEURO: speech is clear with no obvious neurological deficits  PSYCH: mentation  appears normal and affect normal    Data     Renal Latest Ref Rng & Units 5/10/2022 4/12/2022 3/8/2022   Na 133 - 144 mmol/L 138 140 139   K 3.4 - 5.3 mmol/L 4.1 4.2 4.0   Cl 94 - 109 mmol/L 104 105 103   CO2 20 - 32 mmol/L 28 28 26   BUN 7 - 30 mg/dL 18 16 21   Cr 0.66 - 1.25 mg/dL 0.99 1.04 1.12   Glucose 70 - 99 mg/dL 231(H) 239(H) 205(H)   Ca  8.5 - 10.1 mg/dL 9.7 9.8 10.1   Mg 1.6 - 2.3 mg/dL - - -     Bone Health Latest Ref Rng & Units 10/12/2021 9/7/2021 6/7/2021   Phos 2.5 - 4.5 mg/dL 2.7 - 3.1   PTHi 18 - 80 pg/mL - - -   Vit D Def 20 - 75 ug/L - 38 -     Heme Latest Ref Rng & Units 5/10/2022 4/12/2022 3/8/2022   WBC 4.0 - 11.0 10e3/uL 4.4 4.4 4.0   Hgb 13.3 - 17.7 g/dL 12.8(L) 13.4 13.0(L)   Plt 150 - 450 10e3/uL 195 198 195   ABSOLUTE NEUTROPHIL 1.6 - 8.3 10e9/L - - -   ABSOLUTE LYMPHOCYTES 0.8 - 5.3 10e9/L - - -   ABSOLUTE MONOCYTES 0.0 - 1.3 10e9/L - - -   ABSOLUTE EOSINOPHILS 0.0 - 0.7 10e9/L - - -   ABSOLUTE BASOPHILS 0.0 - 0.2 10e9/L - - -   ABS IMMATURE GRANULOCYTES 0 - 0.4 10e9/L - - -   ABSOLUTE NUCLEATED RBC - - - -     Liver Latest Ref Rng & Units 6/7/2021 12/9/2020 7/15/2020   AP 40 - 150 U/L 101 136 157(H)   TBili 0.2 - 1.3 mg/dL 1.6(H) 1.1 1.0   DBili 0.0 - 0.2 mg/dL - - -   ALT 0 - 70 U/L 28 36 34   AST 0 - 45 U/L 27 22 20   Tot Protein 6.8 - 8.8 g/dL 7.3 7.3 7.5   Albumin 3.4 - 5.0 g/dL 3.8 3.8 3.9     Pancreas Latest Ref Rng & Units 3/22/2022 1/11/2022 6/7/2021   A1C 0.0 - 5.6 % 6.7(H) 6.9(H) 6.2(H)   Amylase 30 - 110 U/L - - -     Iron studies Latest Ref Rng & Units 12/10/2019 6/10/2019 7/10/2018   Iron 35 - 180 ug/dL 84 118 66   Iron sat 15 - 46 % 35 47(H) 28   Ferritin 26 - 388 ng/mL 445(H) 644(H) 771(H)     [facility] Txp Virology Latest Ref Rng & Units 7/6/2021 6/10/2021 6/7/2021   CVM DNA Quant - - - Plasma   CMV QUANT IU/ML CMVND:CMV DNA Not Detected [IU]/mL - - CMV DNA Not Detected   LOG IU/ML OF CMVQNT <2.1 [Log:IU]/mL - - Not Calculated   BK Spec - Plasma Plasma -   BK Res BKNEG:BK  Virus DNA Not Detected copies/mL BK Virus DNA Not Detected BK Virus DNA Not Detected -   BK Log <2.7 Log copies/mL Not Calculated Not Calculated -   EBV CAPSID ANTIBODY IGG 0.0 - 0.8 AI - - -   EBV DNA COPIES/ML EBVNEG:EBV DNA Not Detected [Copies]/mL - - EBV DNA Not Detected   EBV DNA LOG OF COPIES <2.7 [Log:copies]/mL - - Not Calculated   Hep B Core NR:Nonreactive - - -        Recent Labs   Lab Test 03/08/22  0942 04/12/22  0934 05/10/22  0959   DOSTAC 3/7/2022 4/11/2022 5/9/2022   TACROL 7.0 6.1 4.5*     Recent Labs   Lab Test 12/26/19  0720 01/27/20  0918 02/03/20  0948   DOSMPA Not Provided NTP 02/02/20 0915pm   MPACID 1.65 2.39 2.41   MPAG 58.9 54.6 50.4

## 2022-05-16 NOTE — LETTER
5/16/2022       RE: Murray Nicholson  [address]  [place] [address]     Dear Colleague,    Thank you for referring your patient, Murray Nicholson, to the [facility] NEPHROLOG[facility] [place] [facility]. Please see a copy of my visit note below.      TRANSPLANT NEPHROLOGY CHRONIC POST TRANSPLANT VISIT    Assessment & Plan   # LDKT: Stable   - Baseline Creatinine:  ~ 1.0-1.2   - Proteinuria: Normal (<0.2 grams)   - Date DSA Last Checked: Jan/2022      Latest DSA: No   - BK Viremia: No   - Kidney Tx Biopsy: No    # Heart Tx: Patient appears to be stable.  Followed by Cardiology.    # Immunosuppression: Tacrolimus immediate release (goal 6-8) and Mycophenolate mofetil (dose 750 mg every 12 hours)   - Continue with intensive monitoring of immunosuppression for efficacy and toxicity.   - Patient is on higher tacrolimus goal due to Heart Transplant guidelines.   - Changes: No    # Infection Prophylaxis:   - PJP: Sulfa/TMP (Bactrim)    # Hypertension: Controlled;  Goal BP: < 130/80   - Changes: No    # Diabetes: Controlled (HbA1c <7%) Last HbA1c: 6.7%   - Management as per primary care.    # Anemia in Chronic Renal Disease: Hgb: Stable, near normal      ANASTASIYA: No   - Iron studies: Not checked recently    # Mineral Bone Disorder:   - Vitamin D; level: Normal        On supplement: Yes  - Calcium; level: Normal        On supplement: Yes    # PAD: No claudication symptoms.    # MGUS: Stable monoclonal IgG immunoglobulin of kappa light chain type with peak of ~ 0.3 with last check 11/2020.   - Will recheck SPEP yearly, next due 3/2023.    # GERD: Controlled on omeprazole.    # Ventral Hernia: Patient is supposed to have surgical repair done in the near future.    # Pancreatic Cyst: Likely side branch IPMN with negative biopsy and recommended follow up MRI in 1 year.   - Will refer patient to GI for further management.    # Skin Cancer Risk:    - Discussed  sun protection and recommend regular follow up with Dermatology.    # Medical Compliance: Yes    # COVID-19 Virus Review: Discussed COVID-19 virus and the potential medical risks.  Reviewed preventative health recommendations, including wearing a mask where appropriate.  Recommended COVID vaccination should be up to date with either an initial vaccination or booster shot when appropriate.  Asked the patient to inform the transplant center if they are exposed or diagnosed with this virus.    # COVID Vaccination Up To Date: Yes    # Transplant History:  Etiology of Kidney Failure: Diabetes mellitus type 2  Tx: LDKT and Heart Tx  Transplant: 12/19/2019 (Kidney), 6/14/2018 (Heart)  Significant changes in immunosuppression: None  Significant transplant-related complications: None    Transplant Office Phone Number: [phone]    Assessment and plan was discussed with the patient and he voiced his understanding and agreement.    Return visit: Return in about 6 months (around 11/16/2022).    Giovany Quijano MD    Chief Complaint   Mr. Nicholson is a 67 year old here for kidney transplant and immunosuppression management.    History of Present Illness    Mr. Nicholson reports feeling good overall with some medical complaints.  Since last clinic visit, patient reports no hospitalizations or new medical complaints and has been doing well overall.  His energy level has been good and remains pretty normal.  He is active, continuing to work about 3 days a week, as well as getting some exercise.  Denies any chest pain or shortness of breath with exertion.  No leg swelling.  He does feel his left upper extremity AV graft seems to have decreased flow and at times feels a bit different.  There is still a palpable pulse there and no pain.  No pain, tingling, numbness or weakness in his arm or hand.  Just feels different at times.    Appetite is good and his weight is up a few pounds.  No nausea, vomiting or diarrhea.  No heartburn  symptoms as long as he takes omeprazole, which was better than famotidine.  No fever, sweats or chills.  No night sweats.  Patient does have a ventral hernia, with the plan to have surgery to repair that sometime next month.    Home BP: 110-120/70-80s    Problem List   Patient Active Problem List   Diagnosis     Esophageal reflux     Allergic rhinitis     Anemia in chronic renal disease     Dyslipidemia     MGUS (monoclonal gammopathy of unknown significance)     Ascending aortic aneurysm (H)     Heart replaced by transplant (H)     Immunosuppression (H)     Type 2 diabetes mellitus with stage 4 chronic kidney disease, without long-term current use of insulin (H)     Pancreatic cyst     Kidney replaced by transplant     Aftercare following organ transplant     HTN, kidney transplant related     Secondary renal hyperparathyroidism (H)     Vitamin D deficiency     Need for pneumocystis prophylaxis     Erectile dysfunction, unspecified erectile dysfunction type     Nocturia     History of hernia repair     Decreased hearing, unspecified laterality       Allergies   Allergies   Allergen Reactions     Norco [Hydrocodone-Acetaminophen] Nausea and Vomiting     Cats      Throat tightness     Isosorbide Other (See Comments)     hypotension     Penicillins Hives     Seasonal Allergies      rhinitis     Shrimp      Throat closes        Medications   Current Outpatient Medications   Medication Sig     amLODIPine (NORVASC) 2.5 MG tablet Take 2 tablets (5 mg) by mouth daily     aspirin 81 MG EC tablet Take 81 mg by mouth daily     atorvastatin (LIPITOR) 40 MG tablet TAKE 1 TABLET(40 MG) BY MOUTH DAILY     biotin 1000 MCG TABS tablet Take 1,000 mcg by mouth daily Patient takes every other day     blood glucose (ACCU-CHEK GUIDE) test strip Use to test blood sugar 3 times daily or as directed.     blood glucose monitoring (ACCU-CHEK FASTCLIX) lancets USE TO TEST 3 TIMES DAILY OR AS DIRECTED.     calcium citrate and vitamin D  (CITRACAL) 200-250 MG-UNIT TABS per tablet Take 1 tablet by mouth 2 times daily     fluticasone (FLONASE) 50 MCG/ACT nasal spray SHAKE LIQUID AND USE 1 SPRAY IN EACH NOSTRIL DAILY     loratadine (CLARITIN) 10 MG tablet Take 10 mg by mouth daily Patient takes at bedtime     metFORMIN (GLUCOPHAGE-XR) 500 MG 24 hr tablet Take 4 tablets (2,000 mg) by mouth daily (with breakfast)     multivitamin (CENTRUM SILVER) tablet Take 1 tablet by mouth daily     mycophenolate (GENERIC EQUIVALENT) 250 MG capsule Take 3 capsules (750 mg) by mouth 2 times daily     OMEPRAZOLE PO Take 20 mg by mouth daily     sulfamethoxazole-trimethoprim (BACTRIM) 400-80 MG tablet Take 1 tablet by mouth daily     tacrolimus (GENERIC EQUIVALENT) 0.5 MG capsule Take ONE cap every PM (0.5 mg every evening)     tacrolimus (GENERIC EQUIVALENT) 1 MG capsule TAKE 1 CAPSULE BY MOUTH EVERY MORNING     Zinc Sulfate (ZINC 15 PO)      No current facility-administered medications for this visit.     Facility-Administered Medications Ordered in Other Visits   Medication     diatrizoate meglumine-sodium (GASTROGRAFIN/GASTROVIEW) 66-10 % solution 480 mL     Medications Discontinued During This Encounter   Medication Reason     famotidine (PEPCID) 20 MG tablet        Physical Exam   Vital Signs: Deferred for this telemedicine visit.    GENERAL APPEARANCE: alert and no distress  HENT: no obvious abnormalities on appearance  RESP: breathing appears unremarkable with normal rate, no audible wheezing or cough and no apparent shortness of breath with conversation  MS: extremities normal - no gross deformities noted  SKIN: no apparent rash and normal skin tone  NEURO: speech is clear with no obvious neurological deficits  PSYCH: mentation appears normal and affect normal    Data     Renal Latest Ref Rng & Units 5/10/2022 4/12/2022 3/8/2022   Na 133 - 144 mmol/L 138 140 139   K 3.4 - 5.3 mmol/L 4.1 4.2 4.0   Cl 94 - 109 mmol/L 104 105 103   CO2 20 - 32 mmol/L 28 28 26   BUN  7 - 30 mg/dL 18 16 21   Cr 0.66 - 1.25 mg/dL 0.99 1.04 1.12   Glucose 70 - 99 mg/dL 231(H) 239(H) 205(H)   Ca  8.5 - 10.1 mg/dL 9.7 9.8 10.1   Mg 1.6 - 2.3 mg/dL - - -     Bone Health Latest Ref Rng & Units 10/12/2021 9/7/2021 6/7/2021   Phos 2.5 - 4.5 mg/dL 2.7 - 3.1   PTHi 18 - 80 pg/mL - - -   Vit D Def 20 - 75 ug/L - 38 -     Heme Latest Ref Rng & Units 5/10/2022 4/12/2022 3/8/2022   WBC 4.0 - 11.0 10e3/uL 4.4 4.4 4.0   Hgb 13.3 - 17.7 g/dL 12.8(L) 13.4 13.0(L)   Plt 150 - 450 10e3/uL 195 198 195   ABSOLUTE NEUTROPHIL 1.6 - 8.3 10e9/L - - -   ABSOLUTE LYMPHOCYTES 0.8 - 5.3 10e9/L - - -   ABSOLUTE MONOCYTES 0.0 - 1.3 10e9/L - - -   ABSOLUTE EOSINOPHILS 0.0 - 0.7 10e9/L - - -   ABSOLUTE BASOPHILS 0.0 - 0.2 10e9/L - - -   ABS IMMATURE GRANULOCYTES 0 - 0.4 10e9/L - - -   ABSOLUTE NUCLEATED RBC - - - -     Liver Latest Ref Rng & Units 6/7/2021 12/9/2020 7/15/2020   AP 40 - 150 U/L 101 136 157(H)   TBili 0.2 - 1.3 mg/dL 1.6(H) 1.1 1.0   DBili 0.0 - 0.2 mg/dL - - -   ALT 0 - 70 U/L 28 36 34   AST 0 - 45 U/L 27 22 20   Tot Protein 6.8 - 8.8 g/dL 7.3 7.3 7.5   Albumin 3.4 - 5.0 g/dL 3.8 3.8 3.9     Pancreas Latest Ref Rng & Units 3/22/2022 1/11/2022 6/7/2021   A1C 0.0 - 5.6 % 6.7(H) 6.9(H) 6.2(H)   Amylase 30 - 110 U/L - - -     Iron studies Latest Ref Rng & Units 12/10/2019 6/10/2019 7/10/2018   Iron 35 - 180 ug/dL 84 118 66   Iron sat 15 - 46 % 35 47(H) 28   Ferritin 26 - 388 ng/mL 445(H) 644(H) 771(H)     [facility] Txp Virology Latest Ref Rng & Units 7/6/2021 6/10/2021 6/7/2021   CVM DNA Quant - - - Plasma   CMV QUANT IU/ML CMVND:CMV DNA Not Detected [IU]/mL - - CMV DNA Not Detected   LOG IU/ML OF CMVQNT <2.1 [Log:IU]/mL - - Not Calculated   BK Spec - Plasma Plasma -   BK Res BKNEG:BK Virus DNA Not Detected copies/mL BK Virus DNA Not Detected BK Virus DNA Not Detected -   BK Log <2.7 Log copies/mL Not Calculated Not Calculated -   EBV CAPSID ANTIBODY IGG 0.0 - 0.8 AI - - -   EBV DNA COPIES/ML EBVNEG:EBV DNA Not Detected  [Copies]/mL - - EBV DNA Not Detected   EBV DNA LOG OF COPIES <2.7 [Log:copies]/mL - - Not Calculated   Hep B Core NR:Nonreactive - - -        Recent Labs   Lab Test 03/08/22  0942 04/12/22  0934 05/10/22  0959   DOSTAC 3/7/2022 4/11/2022 5/9/2022   TACROL 7.0 6.1 4.5*     Recent Labs   Lab Test 12/26/19  0720 01/27/20  0918 02/03/20  0948   DOSMPA Not Provided NTP 02/02/20 0915pm   MPACID 1.65 2.39 2.41   MPAG 58.9 54.6 50.4       Again, thank you for allowing me to participate in the care of your patient.      Sincerely,    Giovany Quijano MD

## 2022-05-17 ENCOUNTER — TELEPHONE (OUTPATIENT)
Dept: SURGERY | Facility: CLINIC | Age: 67
End: 2022-05-17
Payer: MEDICARE

## 2022-05-17 NOTE — TELEPHONE ENCOUNTER
Patient is scheduled for surgery with Dr. Curtis    Spoke with: Murray    Date of Surgery: 6/24/22    Location: [place]    Informed patient they will need an adult  yes    Pre op with Provider n/a    H&P: Scheduled 6/17/22    Pre-procedure COVID-19 Test: 6/20/22    Additional imaging/appointments: n/a    Surgery packet: Sent via mail    Additional comments: n/a

## 2022-05-18 NOTE — TELEPHONE ENCOUNTER
FUTURE VISIT INFORMATION      SURGERY INFORMATION:    Date: 6/24/22    Location: [facility] OR    Surgeon:  Shaheed Curtis MD    Anesthesia Type:  General    Procedure: HERNIORRHAPHY, VENTRAL, OPEN    RECORDS REQUESTED FROM:       Primary Care Provider: [facility]    Pertinent Medical History: Ascending aortic aneurysm, Hypertension     Most recent EKG+ Tracing: 10/12/21    Most recent ECHO: 3/16/22    Most recent Cardiac Stress Test: 10/12/21    Most recent Coronary Angiogram: 6/13/22

## 2022-06-01 DIAGNOSIS — E11.29 TYPE 2 DIABETES MELLITUS WITH OTHER DIABETIC KIDNEY COMPLICATION, WITHOUT LONG-TERM CURRENT USE OF INSULIN (H): ICD-10-CM

## 2022-06-02 ENCOUNTER — OFFICE VISIT (OUTPATIENT)
Dept: FAMILY MEDICINE | Facility: CLINIC | Age: 67
End: 2022-06-02
Payer: MEDICARE

## 2022-06-02 VITALS — DIASTOLIC BLOOD PRESSURE: 78 MMHG | SYSTOLIC BLOOD PRESSURE: 122 MMHG

## 2022-06-02 DIAGNOSIS — D48.9 NEOPLASM OF UNCERTAIN BEHAVIOR: ICD-10-CM

## 2022-06-02 DIAGNOSIS — D48.5 NEOPLASM OF UNCERTAIN BEHAVIOR OF SKIN: Primary | ICD-10-CM

## 2022-06-02 PROCEDURE — 99207 PR DROP WITH A PROCEDURE: CPT | Performed by: PHYSICIAN ASSISTANT

## 2022-06-02 PROCEDURE — 11423 EXC H-F-NK-SP B9+MARG 2.1-3: CPT | Performed by: PHYSICIAN ASSISTANT

## 2022-06-02 PROCEDURE — 88304 TISSUE EXAM BY PATHOLOGIST: CPT | Performed by: DERMATOLOGY

## 2022-06-02 NOTE — PROGRESS NOTES
[facility] Note  Encounter Date: Jun 2, 2022  Office Visit   ____________________________________________    Assessment & Plan:    1. Neoplasm of uncertain behavior, 3.0 cm  Rule out cyst  EXCISIONAL BIOPSY AND COMPLEX:  After thorough discussion of PGACAC, consent obtained, anesthesia with LEC and prep, the margins of the lesion were identified and an elliptical incision was made superficial to the lesion. The incisions were made through to include the mid-subcutaneous tissue.  The lesion was removed en bloc and submitted for derm pathologic review. Because of the full-thickness nature of the wound and to avoid standing cone deformities, a complex linear repair was planned.  The wound edges were widely undermined until adequate tissue mobility was obtained.  Hemostasis was achieved.  The wound edges were then closed in a layered fashion, using Vicryl for subcutaneous stitches and FAPG sutures for running top stitches.  Postoperative length was 3.0 cm.  Patient will be contacted with result.  EBL minimal; complications none; wound care routine.  The patient was discharged in good condition and will return in one week for wound evaluation.        Reviewed pertinent charts and labs prior to office visit.       Follow-up: 1 week(s) in-person, or earlier for new or changing lesions      Provider Disclosure:   The documentation recorded by the scribe accurately reflects the services I personally performed and the decisions made by me.    Matilda Guaman, MS, SPENCER      Scribe Disclosure:  I, Mlel Recinos, am serving as a scribe to document services personally performed by Matilda Guaman PA-C based on data collection and the provider's statements to me.   ____________________________________________________    HPI:  Mr. Murray Nicholson is a(n) 67 year old male who presents today as a return patient for a cyst excision. Patient states this has been present for a while.  Patient reports the following  symptoms: painful, decrease in size .  Patient reports the following previous treatments: drainage.  Patient reports the following modifying factors: none.  Associated symptoms: none.  Patient has no other skin complaints today.  Remainder of the HPI, Meds, PMH, Allergies, FH, and SH was reviewed in chart.     Medications:  Current Outpatient Medications   Medication     amLODIPine (NORVASC) 2.5 MG tablet     aspirin 81 MG EC tablet     atorvastatin (LIPITOR) 40 MG tablet     biotin 1000 MCG TABS tablet     blood glucose (ACCU-CHEK GUIDE) test strip     blood glucose monitoring (ACCU-CHEK FASTCLIX) lancets     calcium citrate and vitamin D (CITRACAL) 200-250 MG-UNIT TABS per tablet     fluticasone (FLONASE) 50 MCG/ACT nasal spray     loratadine (CLARITIN) 10 MG tablet     metFORMIN (GLUCOPHAGE-XR) 500 MG 24 hr tablet     multivitamin (CENTRUM SILVER) tablet     mycophenolate (GENERIC EQUIVALENT) 250 MG capsule     OMEPRAZOLE PO     sulfamethoxazole-trimethoprim (BACTRIM) 400-80 MG tablet     tacrolimus (GENERIC EQUIVALENT) 0.5 MG capsule     tacrolimus (GENERIC EQUIVALENT) 1 MG capsule     Zinc Sulfate (ZINC 15 PO)     No current facility-administered medications for this visit.     Facility-Administered Medications Ordered in Other Visits   Medication     diatrizoate meglumine-sodium (GASTROGRAFIN/GASTROVIEW) 66-10 % solution 480 mL        Past Medical History:   Patient Active Problem List   Diagnosis     Esophageal reflux     Allergic rhinitis     Anemia in chronic renal disease     Dyslipidemia     MGUS (monoclonal gammopathy of unknown significance)     Ascending aortic aneurysm (H)     Heart replaced by transplant (H)     Immunosuppression (H)     Type 2 diabetes mellitus with stage 4 chronic kidney disease, without long-term current use of insulin (H)     Pancreatic cyst     Kidney replaced by transplant     Aftercare following organ transplant     HTN, kidney transplant related     Secondary renal  hyperparathyroidism (H)     Vitamin D deficiency     Need for pneumocystis prophylaxis     Erectile dysfunction, unspecified erectile dysfunction type     Nocturia     History of hernia repair     Decreased hearing, unspecified laterality     Past Medical History:   Diagnosis Date     (HFpEF) heart failure with preserved ejection fraction (H)      Allergic rhinitis, cause unspecified      Anemia of chronic kidney failure      Aortic stenosis 8/13/2008    Overview:  Bicuspid aortic valve     AS (aortic stenosis)      Ascending aortic aneurysm (H)      Bicuspid aortic valve      Bilateral hand pain 6/1/2021     CAD (coronary artery disease)      Congestive heart failure, unspecified      Coronary artery disease involving native artery of transplanted heart without angina pectoris 7/10/2014     Dialysis patient (H)     Tues-Thur-Sat     Dyslipidemia      Esophageal reflux      ESRD (end stage renal disease) (H)      Hearing problem      Heart replaced by transplant (H) 12/10/2018     Hypersomnia with sleep apnea, unspecified      Hypertension      Ileostomy status (H)      Immunosuppression (H)      MGUS (monoclonal gammopathy of unknown significance)      Mitral regurgitation      SHEELA (obstructive sleep apnea)     No CPAP     Pneumonia      Sigmoid diverticulitis 8/14/2018     Status post coronary angiogram 6/28/2019     Systolic heart failure (H)      Type 2 diabetes mellitus (H)        Past Surgical History:   Past Surgical History:   Procedure Laterality Date     BIOPSY       CARDIAC SURGERY       COLONOSCOPY N/A 5/3/2018    Procedure: COLONOSCOPY;  colonoscopy ;  Surgeon: Ammon Castillo MD;  Location: [facility] GI     CREATE FISTULA ARTERIOVENOUS UPPER EXTREMITY BOVINE Left 5/8/2019    Procedure: Left Upper Extremity Arteriovenous Bovine Graft Creation;  Surgeon: Calin Cheney MD;  Location: [facility] OR     CV CORONARY ANGIOGRAM N/A 6/28/2019    Procedure: CV CORONARY ANGIOGRAM;  Surgeon: Montrell Posada MD;   Location:  HEART CARDIAC CATH LAB     CV CORONARY ANGIOGRAM N/A 7/15/2020    Procedure: CV CORONARY ANGIOGRAM;  Surgeon: Marcelo Ramírez MD;  [facility]     CV CORONARY ANGIOGRAM N/A 6/7/2021    Procedure: CV CORONARY ANGIOGRAM;  Surgeon: Gilberto Mccann MD;  Location:  HEART CARDIAC CATH LAB     CV HEART BIOPSY N/A 2/1/2019    Procedure: HBX;  Surgeon: Montrell Posada MD;  Location:  HEART CARDIAC CATH LAB     CV HEART BIOPSY N/A 3/22/2019    Procedure: HBX, RIJV ACCESS;  Surgeon: Jordan Fox MD;  Location:  HEART CARDIAC CATH LAB     CV HEART BIOPSY N/A 6/28/2019    Procedure: CV HEART BIOPSY;  Surgeon: Montrell Posada MD;  Location:  HEART CARDIAC CATH LAB     CV HEART BIOPSY N/A 10/28/2019    Procedure: CV HEART BIOPSY;  Surgeon: Marcelo Ramírez MD;  Location:  HEART CARDIAC CATH LAB     CV HEART BIOPSY N/A 2/6/2020    Procedure: CV HEART BIOPSY;  Surgeon: Montrell Posada MD;  Location:  HEART CARDIAC CATH LAB     CV HEART BIOPSY N/A 7/15/2020    Procedure: CV HEART BIOPSY;  Surgeon: Marcelo Ramírez MD;  Location:  HEART CARDIAC CATH LAB     CV RIGHT HEART CATH MEASUREMENTS RECORDED N/A 6/28/2019    Procedure: CV RIGHT HEART CATH;  Surgeon: Montrell Posada MD;  Location:  HEART CARDIAC CATH LAB     CV RIGHT HEART CATH MEASUREMENTS RECORDED N/A 7/15/2020    Procedure: CV RIGHT HEART CATH;  Surgeon: Marcelo Ramírez MD;  Location:  HEART CARDIAC CATH LAB     CV RIGHT HEART CATH MEASUREMENTS RECORDED N/A 6/7/2021    Procedure: CV RIGHT HEART CATH;  Surgeon: Gilberto Mccann MD;  Location: [facility] CARDIAC CATH LAB     ENDOSCOPIC ULTRASOUND UPPER GASTROINTESTINAL TRACT (GI) N/A 11/8/2021    Procedure: ENDOSCOPIC ULTRASOUND, ESOPHAGOSCOPY / UPPER GASTROINTESTINAL TRACT (GI)  EUS with FNA;  Surgeon: Alon Don MD;  Location:  OR     ESOPHAGOSCOPY, GASTROSCOPY, DUODENOSCOPY (EGD), COMBINED N/A 5/7/2018     Procedure: COMBINED ENDOSCOPIC ULTRASOUND, ESOPHAGOSCOPY, GASTROSCOPY, DUODENOSCOPY (EGD), FINE NEEDLE ASPIRATE/BIOPSY;  Endoscopic Ultrasound with Fine Needle Aspiration ;  Surgeon: Alon Don MD;  Location: [facility]     EXAM UNDER ANESTHESIA RECTUM N/A 8/12/2018    Procedure: EXAM UNDER ANESTHESIA RECTUM;  EXAM UNDER ANESTHESIA RECTUM ,COMBINED INCISION AND DRAINAGE OF RECTAL ABCESS ;  Surgeon: Rick Tran MD;  Location: [facility] OR     INCISION AND DRAINAGE RECTUM, COMBINED N/A 8/12/2018    Procedure: COMBINED INCISION AND DRAINAGE RECTUM;;  Surgeon: Rick Tran MD;  Location: [facility] OR     IR DIALYSIS FISTULOGRAM LEFT  9/25/2019     IR DIALYSIS FISTULOGRAM LEFT  11/22/2019     IR DIALYSIS PTA  9/25/2019     IR DIALYSIS PTA  11/22/2019     LAPAROSCOPIC ASSISTED COLOSTOMY TAKEDOWN N/A 12/11/2018    Procedure: Laparoscopic Assisted Colostomy Takedown, Laparoscopic Lysis of Adhesions;  Surgeon: Rick Tran MD;  Location: [facility]     LAPAROSCOPIC ASSISTED SIGMOID COLECTOMY N/A 8/14/2018    Procedure: LAPAROSCOPIC ASSISTED SIGMOID COLECTOMY;  Laparoscopic Hand Assisted Takedown of Splenic Flexure, Sigmoidectomy, Small Bowel Resection, Takedown of Small Bowel to Colon Fistula;  Surgeon: Rick Tran MD;  Location: [facility]     LAPAROSCOPIC HERNIORRHAPHY INGUINAL BILATERAL Bilateral 7/24/2015    Procedure: LAPAROSCOPIC HERNIORRHAPHY INGUINAL BILATERAL;  Surgeon: Bobby Mcconnell MD;  Location: [facility]     LAPAROSCOPIC INSERTION CATHETER PERITONEAL DIALYSIS N/A 6/22/2017    Procedure: LAPAROSCOPIC INSERTION CATHETER PERITONEAL DIALYSIS;  Laparoscopic Peritoneal Dialysis Catheter Placement - Anesthesia with block;  Surgeon: Esteban Arvizu MD;  Location: [facility]     PICC INSERTION Left 04/22/2018    5Fr - 49cm (3cm external), Basilic vein, low SVC     REMOVE CATHETER PERITONEAL Right 1/15/2018    Procedure: REMOVE CATHETER PERITONEAL;  Open Removal of Peritoneal Dialysis  Catheter ;  Surgeon: Esteban Arvizu MD;  Location: [facility]     SIGMOIDOSCOPY FLEXIBLE N/A 2018    Procedure: Examination Under Anesthesia, Flexible Sigmoidoscopy and Polypectomy;  Surgeon: Rick Tran MD;  [facility]     SIGMOIDOSCOPY FLEXIBLE N/A 2018    Procedure: Flexible Sigmoidoscopy;  Surgeon: Rick Tran MD;  [facility]: [facility]     TAKEDOWN ILEOSTOMY N/A 3/27/2019    Procedure: Takedown Ileostomy;  Surgeon: Rick Tran MD;  [facility]     TRANSPLANT HEART RECIPIENT N/A 2018    Procedure: TRANSPLANT HEART RECIPIENT;  Median Sternotomy, on-pump oxygenator, Heart Transplant;  Surgeon: Rony aCputo MD;  Location: [facility] OR     TRANSPLANT KIDNEY RECIPIENT LIVING UNRELATED  2019       Family History:  Family History   Problem Relation Age of Onset     C.A.D. Father          from-never knew father-age 60     Diabetes Father      Cerebrovascular Disease Father      Hypertension Father      Breast Cancer No family hx of      Cancer - colorectal No family hx of      Prostate Cancer No family hx of      Kidney Disease No family hx of      Melanoma No family hx of      Skin Cancer No family hx of        Social History:  Social History     Tobacco Use     Smoking status: Former Smoker     Packs/day: 1.00     Years: 20.00     Pack years: 20.00     Types: Cigarettes     Start date: 1984     Quit date: 1994     Years since quittin.8     Smokeless tobacco: Never Used   Substance Use Topics     Alcohol use: Yes     Comment: Occasionally          Review Of Systems:  Skin: cyst on back of scalp  Eyes: negative  Ears/Nose/Throat: negative  Respiratory: No shortness of breath, dyspnea on exertion, cough, or hemoptysis  Cardiovascular: negative  Gastrointestinal: negative  Genitourinary: negative  Musculoskeletal: negative  Neurologic: negative  Psychiatric: negative  Hematologic/Lymphatic/Immunologic: negative  Endocrine:  negative      Objective:     /78   Eyes: Conjunctivae/lids: Normal   ENT: Lips:  Normal  MSK: Normal  Cardiovascular: Peripheral edema none  Pulm: Breathing Normal  Neuro/Psych: Orientation: Normal; Mood/Affect: Normal, NAD, WDWN  Pt accompanied by: self  Following areas examined: Scalp, face, neck  Musa skin type: iii   Findings:  Soft mobile smooth nodule with punctum on nape of neck 3.0cm

## 2022-06-02 NOTE — PATIENT INSTRUCTIONS
Sutured Wound Care     [facility]: [phone]    [facility]: [phone]          No strenuous activity for 48 hours. Resume moderate activity in 48 hours. No heavy exercising until you are seen for follow up in one week.     Take Tylenol as needed for discomfort.                         Do not drink alcoholic beverages for 48 hours.     Keep the pressure bandage in place for 24 hours. If the bandage becomes blood tinged or loose, reinforce it with gauze and tape.        (Refer to the reverse side of this page for management of bleeding).    Remove pressure bandage in 24 hours     Leave the flat bandage in place until your follow up appointment.    Keep the bandage dry. Wash around it carefully.    If the tape becomes soiled or starts to come off, reinforce it with additional paper tape.    Do not smoke for 3 weeks; smoking is detrimental to wound healing.    It is normal to have swelling and bruising around the surgical site. The bruising will fade in approximately 10-14 days. Elevate the area to reduce swelling.    Numbness, itchiness and sensitivity to temperature changes can occur after surgery and may take up to 18 months to normalize.      POSSIBLE COMPLICATIONS    BLEEDING:    Leave the bandage in place.  Use tightly rolled up gauze or a cloth to apply direct pressure over the bandage for 20   minutes.  Reapply pressure for an additional 20 minutes if necessary  Call the office or go to the nearest emergency room if pressure fails to stop the bleeding.  Use additional gauze and tape to maintain pressure once the bleeding has stopped.        PAIN:    Post operative pain should slowly get better, never worse.  A severe increase in pain may indicate a problem. Call the office if this occurs.    In case of emergency phone: [phone]

## 2022-06-02 NOTE — LETTER
6/2/2022         RE: Murray Nicholson  [address]  [place] [address]        Dear Colleague,    Thank you for referring your patient, Murray Nicholson, to the [facility]. Please see a copy of my visit note below.    [facility] Note  Encounter Date: Jun 2, 2022  Office Visit   ____________________________________________    Assessment & Plan:    1. Neoplasm of uncertain behavior, 3.0 cm  Rule out cyst  EXCISIONAL BIOPSY AND COMPLEX:  After thorough discussion of PGACAC, consent obtained, anesthesia with LEC and prep, the margins of the lesion were identified and an elliptical incision was made superficial to the lesion. The incisions were made through to include the mid-subcutaneous tissue.  The lesion was removed en bloc and submitted for derm pathologic review. Because of the full-thickness nature of the wound and to avoid standing cone deformities, a complex linear repair was planned.  The wound edges were widely undermined until adequate tissue mobility was obtained.  Hemostasis was achieved.  The wound edges were then closed in a layered fashion, using Vicryl for subcutaneous stitches and FAPG sutures for running top stitches.  Postoperative length was 3.0 cm.  Patient will be contacted with result.  EBL minimal; complications none; wound care routine.  The patient was discharged in good condition and will return in one week for wound evaluation.        Reviewed pertinent charts and labs prior to office visit.       Follow-up: 1 week(s) in-person, or earlier for new or changing lesions      Provider Disclosure:   The documentation recorded by the scribe accurately reflects the services I personally performed and the decisions made by me.    Matilda Guaman, MS, SPENCER      Scribe Disclosure:  I, Mell Recinos, am serving as a scribe to document services personally performed by Matilda Guaman PA-C based on data collection and the provider's  statements to me.   ____________________________________________________    HPI:  Mr. Murray Nicholson is a(n) 67 year old male who presents today as a return patient for a cyst excision. Patient states this has been present for a while.  Patient reports the following symptoms: painful, decrease in size .  Patient reports the following previous treatments: drainage.  Patient reports the following modifying factors: none.  Associated symptoms: none.  Patient has no other skin complaints today.  Remainder of the HPI, Meds, PMH, Allergies, FH, and SH was reviewed in chart.     Medications:  Current Outpatient Medications   Medication     amLODIPine (NORVASC) 2.5 MG tablet     aspirin 81 MG EC tablet     atorvastatin (LIPITOR) 40 MG tablet     biotin 1000 MCG TABS tablet     blood glucose (ACCU-CHEK GUIDE) test strip     blood glucose monitoring (ACCU-CHEK FASTCLIX) lancets     calcium citrate and vitamin D (CITRACAL) 200-250 MG-UNIT TABS per tablet     fluticasone (FLONASE) 50 MCG/ACT nasal spray     loratadine (CLARITIN) 10 MG tablet     metFORMIN (GLUCOPHAGE-XR) 500 MG 24 hr tablet     multivitamin (CENTRUM SILVER) tablet     mycophenolate (GENERIC EQUIVALENT) 250 MG capsule     OMEPRAZOLE PO     sulfamethoxazole-trimethoprim (BACTRIM) 400-80 MG tablet     tacrolimus (GENERIC EQUIVALENT) 0.5 MG capsule     tacrolimus (GENERIC EQUIVALENT) 1 MG capsule     Zinc Sulfate (ZINC 15 PO)     No current facility-administered medications for this visit.     Facility-Administered Medications Ordered in Other Visits   Medication     diatrizoate meglumine-sodium (GASTROGRAFIN/GASTROVIEW) 66-10 % solution 480 mL        Past Medical History:   Patient Active Problem List   Diagnosis     Esophageal reflux     Allergic rhinitis     Anemia in chronic renal disease     Dyslipidemia     MGUS (monoclonal gammopathy of unknown significance)     Ascending aortic aneurysm (H)     Heart replaced by transplant (H)     Immunosuppression (H)      Type 2 diabetes mellitus with stage 4 chronic kidney disease, without long-term current use of insulin (H)     Pancreatic cyst     Kidney replaced by transplant     Aftercare following organ transplant     HTN, kidney transplant related     Secondary renal hyperparathyroidism (H)     Vitamin D deficiency     Need for pneumocystis prophylaxis     Erectile dysfunction, unspecified erectile dysfunction type     Nocturia     History of hernia repair     Decreased hearing, unspecified laterality     Past Medical History:   Diagnosis Date     (HFpEF) heart failure with preserved ejection fraction (H)      Allergic rhinitis, cause unspecified      Anemia of chronic kidney failure      Aortic stenosis 8/13/2008    Overview:  Bicuspid aortic valve     AS (aortic stenosis)      Ascending aortic aneurysm (H)      Bicuspid aortic valve      Bilateral hand pain 6/1/2021     CAD (coronary artery disease)      Congestive heart failure, unspecified      Coronary artery disease involving native artery of transplanted heart without angina pectoris 7/10/2014     Dialysis patient (H)     Luises-Thur-Sat     Dyslipidemia      Esophageal reflux      ESRD (end stage renal disease) (H)      Hearing problem      Heart replaced by transplant (H) 12/10/2018     Hypersomnia with sleep apnea, unspecified      Hypertension      Ileostomy status (H)      Immunosuppression (H)      MGUS (monoclonal gammopathy of unknown significance)      Mitral regurgitation      SHEELA (obstructive sleep apnea)     No CPAP     Pneumonia      Sigmoid diverticulitis 8/14/2018     Status post coronary angiogram 6/28/2019     Systolic heart failure (H)      Type 2 diabetes mellitus (H)        Past Surgical History:   Past Surgical History:   Procedure Laterality Date     BIOPSY       CARDIAC SURGERY       COLONOSCOPY N/A 5/3/2018    Procedure: COLONOSCOPY;  colonoscopy ;  Surgeon: Ammon Castillo MD;  [facility]: [facility]     CREATE FISTULA ARTERIOVENOUS UPPER EXTREMITY  BOVINE Left 5/8/2019    Procedure: Left Upper Extremity Arteriovenous Bovine Graft Creation;  Surgeon: Calin Cheney MD;  [facility]     CV CORONARY ANGIOGRAM N/A 6/28/2019    Procedure: CV CORONARY ANGIOGRAM;  Surgeon: Montrell Posada MD;  [facility]     CV CORONARY ANGIOGRAM N/A 7/15/2020    Procedure: CV CORONARY ANGIOGRAM;  Surgeon: Marcelo Ramírez MD;  [facility]     CV CORONARY ANGIOGRAM N/A 6/7/2021    Procedure: CV CORONARY ANGIOGRAM;  Surgeon: Gilberto Mccann MD;  [facility]     CV HEART BIOPSY N/A 2/1/2019    Procedure: HBX;  Surgeon: Montrell Posada MD;  [facility]     CV HEART BIOPSY N/A 3/22/2019    Procedure: HBX, RIJV ACCESS;  Surgeon: Jordan Fox MD;  [facility]     CV HEART BIOPSY N/A 6/28/2019    Procedure: CV HEART BIOPSY;  Surgeon: Montrell Posada MD;  [facility]     CV HEART BIOPSY N/A 10/28/2019    Procedure: CV HEART BIOPSY;  Surgeon: Marcelo Ramírez MD;  [facility]     CV HEART BIOPSY N/A 2/6/2020    Procedure: CV HEART BIOPSY;  Surgeon: Montrell Posada MD;  [facility]     CV HEART BIOPSY N/A 7/15/2020    Procedure: CV HEART BIOPSY;  Surgeon: Marcelo Ramírez MD;  [facility]     CV RIGHT HEART CATH MEASUREMENTS RECORDED N/A 6/28/2019    Procedure: CV RIGHT HEART CATH;  Surgeon: Montrell Posada MD;  [facility]     CV RIGHT HEART CATH MEASUREMENTS RECORDED N/A 7/15/2020    Procedure: CV RIGHT HEART CATH;  Surgeon: Marcelo Ramírez MD;  [facility]     CV RIGHT HEART CATH MEASUREMENTS RECORDED N/A 6/7/2021    Procedure: CV RIGHT HEART CATH;  Surgeon: Gilberto Mccann MD;  [facility]     ENDOSCOPIC ULTRASOUND UPPER GASTROINTESTINAL TRACT  (GI) N/A 11/8/2021    Procedure: ENDOSCOPIC ULTRASOUND, ESOPHAGOSCOPY / UPPER GASTROINTESTINAL TRACT (GI)  EUS with FNA;  Surgeon: Alon Don MD;  Location: [facility]     ESOPHAGOSCOPY, GASTROSCOPY, DUODENOSCOPY (EGD), COMBINED N/A 5/7/2018    Procedure: COMBINED ENDOSCOPIC ULTRASOUND, ESOPHAGOSCOPY, GASTROSCOPY, DUODENOSCOPY (EGD), FINE NEEDLE ASPIRATE/BIOPSY;  Endoscopic Ultrasound with Fine Needle Aspiration ;  Surgeon: Alon Don MD;  [facility]     EXAM UNDER ANESTHESIA RECTUM N/A 8/12/2018    Procedure: EXAM UNDER ANESTHESIA RECTUM;  EXAM UNDER ANESTHESIA RECTUM ,COMBINED INCISION AND DRAINAGE OF RECTAL ABCESS ;  Surgeon: Rick Tran MD;  [facility]     INCISION AND DRAINAGE RECTUM, COMBINED N/A 8/12/2018    Procedure: COMBINED INCISION AND DRAINAGE RECTUM;;  Surgeon: Rick Tran MD;  [facility]     IR DIALYSIS FISTULOGRAM LEFT  9/25/2019     IR DIALYSIS FISTULOGRAM LEFT  11/22/2019     IR DIALYSIS PTA  9/25/2019     IR DIALYSIS PTA  11/22/2019     LAPAROSCOPIC ASSISTED COLOSTOMY TAKEDOWN N/A 12/11/2018    Procedure: Laparoscopic Assisted Colostomy Takedown, Laparoscopic Lysis of Adhesions;  Surgeon: Rick Tran MD;  [facility]     LAPAROSCOPIC ASSISTED SIGMOID COLECTOMY N/A 8/14/2018    Procedure: LAPAROSCOPIC ASSISTED SIGMOID COLECTOMY;  Laparoscopic Hand Assisted Takedown of Splenic Flexure, Sigmoidectomy, Small Bowel Resection, Takedown of Small Bowel to Colon Fistula;  Surgeon: Rick Tran MD;  [facility]     LAPAROSCOPIC HERNIORRHAPHY INGUINAL BILATERAL Bilateral 7/24/2015    Procedure: LAPAROSCOPIC HERNIORRHAPHY INGUINAL BILATERAL;  Surgeon: Bobby Mcconnell MD;  [facility]     LAPAROSCOPIC INSERTION CATHETER PERITONEAL DIALYSIS N/A 6/22/2017    Procedure: LAPAROSCOPIC INSERTION CATHETER PERITONEAL DIALYSIS;  Laparoscopic Peritoneal Dialysis Catheter Placement - Anesthesia with block;  Surgeon:  Esteban Arvizu MD;  [facility]     PICC INSERTION Left 2018    5Fr - 49cm (3cm external), Basilic vein, low SVC     REMOVE CATHETER PERITONEAL Right 1/15/2018    Procedure: REMOVE CATHETER PERITONEAL;  Open Removal of Peritoneal Dialysis Catheter ;  Surgeon: Esteban Arvizu MD;  [facility]     SIGMOIDOSCOPY FLEXIBLE N/A 2018    Procedure: Examination Under Anesthesia, Flexible Sigmoidoscopy and Polypectomy;  Surgeon: Rick Tran MD;  [facility]     SIGMOIDOSCOPY FLEXIBLE N/A 2018    Procedure: Flexible Sigmoidoscopy;  Surgeon: Rick Tran MD;  [facility]     TAKEDOWN ILEOSTOMY N/A 3/27/2019    Procedure: Takedown Ileostomy;  Surgeon: Rick Tran MD;  [facility]     TRANSPLANT HEART RECIPIENT N/A 2018    Procedure: TRANSPLANT HEART RECIPIENT;  Median Sternotomy, on-pump oxygenator, Heart Transplant;  Surgeon: Rony Caputo MD;  [facility]     TRANSPLANT KIDNEY RECIPIENT LIVING UNRELATED  2019       Family History:  Family History   Problem Relation Age of Onset     C.A.D. Father          from-never knew father-age 60     Diabetes Father      Cerebrovascular Disease Father      Hypertension Father      Breast Cancer No family hx of      Cancer - colorectal No family hx of      Prostate Cancer No family hx of      Kidney Disease No family hx of      Melanoma No family hx of      Skin Cancer No family hx of        Social History:  Social History     Tobacco Use     Smoking status: Former Smoker     Packs/day: 1.00     Years: 20.00     Pack years: 20.00     Types: Cigarettes     Start date: 1984     Quit date: 1994     Years since quittin.8     Smokeless tobacco: Never Used   Substance Use Topics     Alcohol use: Yes     Comment: Occasionally          Review Of Systems:  Skin: cyst on back of scalp  Eyes: negative  Ears/Nose/Throat: negative  Respiratory: No shortness of breath, dyspnea on  exertion, cough, or hemoptysis  Cardiovascular: negative  Gastrointestinal: negative  Genitourinary: negative  Musculoskeletal: negative  Neurologic: negative  Psychiatric: negative  Hematologic/Lymphatic/Immunologic: negative  Endocrine: negative      Objective:     /78   Eyes: Conjunctivae/lids: Normal   ENT: Lips:  Normal  MSK: Normal  Cardiovascular: Peripheral edema none  Pulm: Breathing Normal  Neuro/Psych: Orientation: Normal; Mood/Affect: Normal, NAD, WDWN  Pt accompanied by: self  Following areas examined: Scalp, face, neck  Musa skin type: iii   Findings:  Soft mobile smooth nodule with punctum on nape of neck 3.0cm          Again, thank you for allowing me to participate in the care of your patient.        Sincerely,        Matilda Guaman PA-C

## 2022-06-06 ENCOUNTER — TELEPHONE (OUTPATIENT)
Dept: NURSING | Facility: CLINIC | Age: 67
End: 2022-06-06
Payer: MEDICARE

## 2022-06-06 RX ORDER — METFORMIN HCL 500 MG
2000 TABLET, EXTENDED RELEASE 24 HR ORAL
Qty: 360 TABLET | Refills: 0 | Status: SHIPPED | OUTPATIENT
Start: 2022-06-06 | End: 2022-09-06

## 2022-06-06 NOTE — TELEPHONE ENCOUNTER
Outreach attempt made to review COVID testing options for upcoming procedure. Patient asked to call back [phone] to discuss further.     Carrie Levin LPN

## 2022-06-06 NOTE — TELEPHONE ENCOUNTER
"metFORMIN (GLUCOPHAGE-XR) 500 MG 24 hr tablet      Last Written Prescription Date: 6/3/21  Last Fill Quantity: 360,   # refills: 3  Last Office Visit :8/23/21  Future Office visit: none     \" Return in about 1 year (around 8/23/2022\"            "

## 2022-06-07 ENCOUNTER — PRE VISIT (OUTPATIENT)
Dept: TRANSPLANT | Facility: CLINIC | Age: 67
End: 2022-06-07
Payer: MEDICARE

## 2022-06-07 ENCOUNTER — MYC MEDICAL ADVICE (OUTPATIENT)
Dept: TRANSPLANT | Facility: CLINIC | Age: 67
End: 2022-06-07
Payer: MEDICARE

## 2022-06-07 ENCOUNTER — TELEPHONE (OUTPATIENT)
Dept: TRANSPLANT | Facility: CLINIC | Age: 67
End: 2022-06-07
Payer: MEDICARE

## 2022-06-07 DIAGNOSIS — E10.29 DM (DIABETES MELLITUS) TYPE I CONTROLLED WITH RENAL MANIFESTATION (H): ICD-10-CM

## 2022-06-07 DIAGNOSIS — Z79.899 ENCOUNTER FOR LONG-TERM (CURRENT) USE OF MEDICATIONS: ICD-10-CM

## 2022-06-07 DIAGNOSIS — Z13.220 ENCOUNTER FOR LIPID SCREENING FOR CARDIOVASCULAR DISEASE: ICD-10-CM

## 2022-06-07 DIAGNOSIS — Z13.6 ENCOUNTER FOR LIPID SCREENING FOR CARDIOVASCULAR DISEASE: ICD-10-CM

## 2022-06-07 DIAGNOSIS — Z12.5 PROSTATE CANCER SCREENING: ICD-10-CM

## 2022-06-07 DIAGNOSIS — Z94.1 HEART REPLACED BY TRANSPLANT (H): Primary | ICD-10-CM

## 2022-06-07 LAB
PATH REPORT.COMMENTS IMP SPEC: NORMAL
PATH REPORT.COMMENTS IMP SPEC: NORMAL
PATH REPORT.FINAL DX SPEC: NORMAL
PATH REPORT.GROSS SPEC: NORMAL
PATH REPORT.MICROSCOPIC SPEC OTHER STN: NORMAL
PATH REPORT.RELEVANT HX SPEC: NORMAL

## 2022-06-07 RX ORDER — ASPIRIN 81 MG/1
243 TABLET, CHEWABLE ORAL ONCE
Status: CANCELLED | OUTPATIENT
Start: 2022-06-07

## 2022-06-07 RX ORDER — ASPIRIN 325 MG
325 TABLET ORAL ONCE
Status: CANCELLED | OUTPATIENT
Start: 2022-06-07 | End: 2022-06-07

## 2022-06-07 RX ORDER — POTASSIUM CHLORIDE 1500 MG/1
40 TABLET, EXTENDED RELEASE ORAL
Status: CANCELLED | OUTPATIENT
Start: 2022-06-07

## 2022-06-07 RX ORDER — POTASSIUM CHLORIDE 1500 MG/1
20 TABLET, EXTENDED RELEASE ORAL
Status: CANCELLED | OUTPATIENT
Start: 2022-06-07

## 2022-06-07 RX ORDER — SODIUM CHLORIDE 9 MG/ML
INJECTION, SOLUTION INTRAVENOUS CONTINUOUS
Status: CANCELLED | OUTPATIENT
Start: 2022-06-07

## 2022-06-07 NOTE — TELEPHONE ENCOUNTER
Reviewed upcoming appts with pt - pt will take a home COVID test on Sat or Sun, take a photo of the neg results and bring to cath lab on Monday.    Fasting labs with tac level.    Pt confirmed he has a  post procedure.     Enc to call with any questions.

## 2022-06-09 ENCOUNTER — ALLIED HEALTH/NURSE VISIT (OUTPATIENT)
Dept: FAMILY MEDICINE | Facility: CLINIC | Age: 67
End: 2022-06-09
Payer: MEDICARE

## 2022-06-09 DIAGNOSIS — Z51.89 VISIT FOR WOUND CHECK: Primary | ICD-10-CM

## 2022-06-09 PROCEDURE — 99207 PR NO CHARGE NURSE ONLY: CPT | Performed by: PHYSICIAN ASSISTANT

## 2022-06-09 NOTE — PROGRESS NOTES
Pt returned to clinic for post surgery 1 week follow up bandage change. Pt has no complaints, denies pain. Bandage removed from back of neck, area cleansed with normal saline. Site is healing and wound edges approximating well. Reapplied new steri strips and paper tape.    Advised to watch for signs/sx of infection; spreading redness, drainage, odor, fever. Call or report promptly to clinic. Pt given written instructions and informed to rtc as needed. Patient verbalized understanding.     Murray Nicholson comes into clinic today at the request of Matilda Guaman Ordering Provider for bandage change.    LOV 6/2/22    This service provided today was under the supervising provider of the day Matilda Guaman, who was available if needed.    Laura Grover RN

## 2022-06-10 ENCOUNTER — TELEPHONE (OUTPATIENT)
Dept: CARDIOLOGY | Facility: CLINIC | Age: 67
End: 2022-06-10
Payer: MEDICARE

## 2022-06-10 NOTE — TELEPHONE ENCOUNTER
Call complete for pre procedure reminder and updated visitor policy.  Pt doing home covid test on Saturday. Will bring picture of results

## 2022-06-10 NOTE — TELEPHONE ENCOUNTER
Pt had not read my chart message re metformin for angiogram   Reviewed plan to hold M-T-W.     Pt verbalized understanding

## 2022-06-12 DIAGNOSIS — E11.29 TYPE 2 DIABETES MELLITUS WITH OTHER DIABETIC KIDNEY COMPLICATION, WITHOUT LONG-TERM CURRENT USE OF INSULIN (H): ICD-10-CM

## 2022-06-13 ENCOUNTER — HOSPITAL ENCOUNTER (OUTPATIENT)
Dept: CARDIOLOGY | Facility: CLINIC | Age: 67
Discharge: HOME | End: 2022-06-13
Attending: INTERNAL MEDICINE | Admitting: INTERNAL MEDICINE
Payer: MEDICARE

## 2022-06-13 ENCOUNTER — HOSPITAL ENCOUNTER (OUTPATIENT)
Dept: GENERAL RADIOLOGY | Facility: CLINIC | Age: 67
Discharge: HOME | End: 2022-06-13
Attending: INTERNAL MEDICINE | Admitting: INTERNAL MEDICINE
Payer: MEDICARE

## 2022-06-13 ENCOUNTER — HOSPITAL ENCOUNTER (OUTPATIENT)
Facility: CLINIC | Age: 67
Discharge: HOME | End: 2022-06-13
Attending: INTERNAL MEDICINE | Admitting: INTERNAL MEDICINE
Payer: MEDICARE

## 2022-06-13 ENCOUNTER — APPOINTMENT (OUTPATIENT)
Dept: LAB | Facility: CLINIC | Age: 67
End: 2022-06-13
Attending: INTERNAL MEDICINE
Payer: MEDICARE

## 2022-06-13 ENCOUNTER — APPOINTMENT (OUTPATIENT)
Dept: MEDSURG UNIT | Facility: CLINIC | Age: 67
End: 2022-06-13
Attending: INTERNAL MEDICINE
Payer: MEDICARE

## 2022-06-13 VITALS
TEMPERATURE: 96.8 F | OXYGEN SATURATION: 100 % | SYSTOLIC BLOOD PRESSURE: 159 MMHG | DIASTOLIC BLOOD PRESSURE: 93 MMHG | BODY MASS INDEX: 26.19 KG/M2 | HEIGHT: 69 IN | HEART RATE: 76 BPM | WEIGHT: 176.8 LBS | RESPIRATION RATE: 16 BRPM

## 2022-06-13 DIAGNOSIS — Z13.6 ENCOUNTER FOR LIPID SCREENING FOR CARDIOVASCULAR DISEASE: ICD-10-CM

## 2022-06-13 DIAGNOSIS — Z94.1 HEART REPLACED BY TRANSPLANT (H): ICD-10-CM

## 2022-06-13 DIAGNOSIS — Z12.5 PROSTATE CANCER SCREENING: ICD-10-CM

## 2022-06-13 DIAGNOSIS — Z79.899 ENCOUNTER FOR LONG-TERM (CURRENT) USE OF MEDICATIONS: ICD-10-CM

## 2022-06-13 DIAGNOSIS — Z13.220 ENCOUNTER FOR LIPID SCREENING FOR CARDIOVASCULAR DISEASE: ICD-10-CM

## 2022-06-13 DIAGNOSIS — E10.29 DM (DIABETES MELLITUS) TYPE I CONTROLLED WITH RENAL MANIFESTATION (H): ICD-10-CM

## 2022-06-13 PROBLEM — Z98.890 STATUS POST CORONARY ANGIOGRAM: Status: ACTIVE | Noted: 2022-06-13

## 2022-06-13 LAB
ALBUMIN SERPL-MCNC: 3.8 G/DL (ref 3.4–5)
ALP SERPL-CCNC: 126 U/L (ref 40–150)
ALT SERPL W P-5'-P-CCNC: 28 U/L (ref 0–70)
ANION GAP SERPL CALCULATED.3IONS-SCNC: 8 MMOL/L (ref 3–14)
AST SERPL W P-5'-P-CCNC: 24 U/L (ref 0–45)
BILIRUB SERPL-MCNC: 1 MG/DL (ref 0.2–1.3)
BUN SERPL-MCNC: 17 MG/DL (ref 7–30)
CALCIUM SERPL-MCNC: 9.6 MG/DL (ref 8.5–10.1)
CHLORIDE BLD-SCNC: 105 MMOL/L (ref 94–109)
CHOLEST SERPL-MCNC: 114 MG/DL
CK SERPL-CCNC: 318 U/L (ref 30–300)
CMV DNA SPEC NAA+PROBE-ACNC: NOT DETECTED IU/ML
CO2 SERPL-SCNC: 25 MMOL/L (ref 20–32)
CREAT SERPL-MCNC: 0.87 MG/DL (ref 0.66–1.25)
ERYTHROCYTE [DISTWIDTH] IN BLOOD BY AUTOMATED COUNT: 13.1 % (ref 10–15)
FASTING STATUS PATIENT QL REPORTED: ABNORMAL
GFR SERPL CREATININE-BSD FRML MDRD: >90 ML/MIN/1.73M2
GLUCOSE BLD-MCNC: 145 MG/DL (ref 70–99)
HBA1C MFR BLD: 7 % (ref 0–5.6)
HCT VFR BLD AUTO: 38.4 % (ref 40–53)
HDLC SERPL-MCNC: 45 MG/DL
HGB BLD-MCNC: 12.6 G/DL (ref 13.3–17.7)
HGB BLD-MCNC: 12.8 G/DL (ref 13.3–17.7)
HOLD SPECIMEN: NORMAL
LDLC SERPL CALC-MCNC: 35 MG/DL
LVEF ECHO: NORMAL
MAGNESIUM SERPL-MCNC: 1.6 MG/DL (ref 1.6–2.3)
MCH RBC QN AUTO: 29.8 PG (ref 26.5–33)
MCHC RBC AUTO-ENTMCNC: 33.3 G/DL (ref 31.5–36.5)
MCV RBC AUTO: 90 FL (ref 78–100)
NONHDLC SERPL-MCNC: 69 MG/DL
OXYHGB MFR BLDV: 71 % (ref 92–100)
PHOSPHATE SERPL-MCNC: 3.2 MG/DL (ref 2.5–4.5)
PLATELET # BLD AUTO: 199 10E3/UL (ref 150–450)
POTASSIUM BLD-SCNC: 3.7 MMOL/L (ref 3.4–5.3)
PROT SERPL-MCNC: 7.4 G/DL (ref 6.8–8.8)
PSA SERPL-MCNC: 2.67 UG/L (ref 0–4)
RBC # BLD AUTO: 4.29 10E6/UL (ref 4.4–5.9)
SODIUM SERPL-SCNC: 138 MMOL/L (ref 133–144)
TACROLIMUS BLD-MCNC: 5.7 UG/L (ref 5–15)
TME LAST DOSE: NORMAL H
TME LAST DOSE: NORMAL H
TRIGL SERPL-MCNC: 170 MG/DL
WBC # BLD AUTO: 3.9 10E3/UL (ref 4–11)

## 2022-06-13 PROCEDURE — 250N000011 HC RX IP 250 OP 636: Performed by: INTERNAL MEDICINE

## 2022-06-13 PROCEDURE — C1894 INTRO/SHEATH, NON-LASER: HCPCS | Performed by: INTERNAL MEDICINE

## 2022-06-13 PROCEDURE — 93306 TTE W/DOPPLER COMPLETE: CPT

## 2022-06-13 PROCEDURE — 99152 MOD SED SAME PHYS/QHP 5/>YRS: CPT | Performed by: INTERNAL MEDICINE

## 2022-06-13 PROCEDURE — 999N000134 HC STATISTIC PP CARE STAGE 3

## 2022-06-13 PROCEDURE — 93005 ELECTROCARDIOGRAM TRACING: CPT

## 2022-06-13 PROCEDURE — 80053 COMPREHEN METABOLIC PANEL: CPT | Performed by: INTERNAL MEDICINE

## 2022-06-13 PROCEDURE — C1887 CATHETER, GUIDING: HCPCS | Performed by: INTERNAL MEDICINE

## 2022-06-13 PROCEDURE — 250N000013 HC RX MED GY IP 250 OP 250 PS 637: Performed by: INTERNAL MEDICINE

## 2022-06-13 PROCEDURE — 82550 ASSAY OF CK (CPK): CPT | Performed by: INTERNAL MEDICINE

## 2022-06-13 PROCEDURE — 272N000001 HC OR GENERAL SUPPLY STERILE: Performed by: INTERNAL MEDICINE

## 2022-06-13 PROCEDURE — 86833 HLA CLASS II HIGH DEFIN QUAL: CPT | Performed by: INTERNAL MEDICINE

## 2022-06-13 PROCEDURE — 85027 COMPLETE CBC AUTOMATED: CPT | Performed by: INTERNAL MEDICINE

## 2022-06-13 PROCEDURE — 80061 LIPID PANEL: CPT | Performed by: INTERNAL MEDICINE

## 2022-06-13 PROCEDURE — 83735 ASSAY OF MAGNESIUM: CPT | Performed by: INTERNAL MEDICINE

## 2022-06-13 PROCEDURE — G0103 PSA SCREENING: HCPCS | Performed by: INTERNAL MEDICINE

## 2022-06-13 PROCEDURE — 80197 ASSAY OF TACROLIMUS: CPT | Performed by: INTERNAL MEDICINE

## 2022-06-13 PROCEDURE — 86832 HLA CLASS I HIGH DEFIN QUAL: CPT | Performed by: INTERNAL MEDICINE

## 2022-06-13 PROCEDURE — 999N000054 HC STATISTIC EKG NON-CHARGEABLE

## 2022-06-13 PROCEDURE — 93010 ELECTROCARDIOGRAM REPORT: CPT | Performed by: INTERNAL MEDICINE

## 2022-06-13 PROCEDURE — 86352 CELL FUNCTION ASSAY W/STIM: CPT | Performed by: INTERNAL MEDICINE

## 2022-06-13 PROCEDURE — 84100 ASSAY OF PHOSPHORUS: CPT | Performed by: INTERNAL MEDICINE

## 2022-06-13 PROCEDURE — 82810 BLOOD GASES O2 SAT ONLY: CPT

## 2022-06-13 PROCEDURE — 71046 X-RAY EXAM CHEST 2 VIEWS: CPT

## 2022-06-13 PROCEDURE — 999N000142 HC STATISTIC PROCEDURE PREP ONLY

## 2022-06-13 PROCEDURE — 71046 X-RAY EXAM CHEST 2 VIEWS: CPT | Mod: 26 | Performed by: RADIOLOGY

## 2022-06-13 PROCEDURE — 250N000009 HC RX 250: Performed by: INTERNAL MEDICINE

## 2022-06-13 PROCEDURE — 85018 HEMOGLOBIN: CPT

## 2022-06-13 PROCEDURE — 93456 R HRT CORONARY ARTERY ANGIO: CPT | Performed by: INTERNAL MEDICINE

## 2022-06-13 PROCEDURE — 99153 MOD SED SAME PHYS/QHP EA: CPT | Performed by: INTERNAL MEDICINE

## 2022-06-13 PROCEDURE — 87799 DETECT AGENT NOS DNA QUANT: CPT | Performed by: INTERNAL MEDICINE

## 2022-06-13 PROCEDURE — 99152 MOD SED SAME PHYS/QHP 5/>YRS: CPT | Mod: GC | Performed by: INTERNAL MEDICINE

## 2022-06-13 PROCEDURE — 83036 HEMOGLOBIN GLYCOSYLATED A1C: CPT | Performed by: INTERNAL MEDICINE

## 2022-06-13 PROCEDURE — 36415 COLL VENOUS BLD VENIPUNCTURE: CPT | Performed by: INTERNAL MEDICINE

## 2022-06-13 PROCEDURE — 93306 TTE W/DOPPLER COMPLETE: CPT | Mod: 26 | Performed by: INTERNAL MEDICINE

## 2022-06-13 PROCEDURE — 93456 R HRT CORONARY ARTERY ANGIO: CPT | Mod: 26 | Performed by: INTERNAL MEDICINE

## 2022-06-13 RX ORDER — SODIUM CHLORIDE 9 MG/ML
INJECTION, SOLUTION INTRAVENOUS CONTINUOUS
Status: DISCONTINUED | OUTPATIENT
Start: 2022-06-13 | End: 2022-06-13 | Stop reason: HOSPADM

## 2022-06-13 RX ORDER — FLUMAZENIL 0.1 MG/ML
0.2 INJECTION, SOLUTION INTRAVENOUS
Status: DISCONTINUED | OUTPATIENT
Start: 2022-06-13 | End: 2022-06-13 | Stop reason: HOSPADM

## 2022-06-13 RX ORDER — POTASSIUM CHLORIDE 750 MG/1
20 TABLET, EXTENDED RELEASE ORAL
Status: COMPLETED | OUTPATIENT
Start: 2022-06-13 | End: 2022-06-13

## 2022-06-13 RX ORDER — FENTANYL CITRATE 50 UG/ML
INJECTION, SOLUTION INTRAMUSCULAR; INTRAVENOUS
Status: DISCONTINUED | OUTPATIENT
Start: 2022-06-13 | End: 2022-06-13 | Stop reason: HOSPADM

## 2022-06-13 RX ORDER — LIDOCAINE 40 MG/G
CREAM TOPICAL
Status: DISCONTINUED | OUTPATIENT
Start: 2022-06-13 | End: 2022-06-13 | Stop reason: HOSPADM

## 2022-06-13 RX ORDER — POTASSIUM CHLORIDE 750 MG/1
40 TABLET, EXTENDED RELEASE ORAL
Status: DISCONTINUED | OUTPATIENT
Start: 2022-06-13 | End: 2022-06-13 | Stop reason: HOSPADM

## 2022-06-13 RX ORDER — PANTOPRAZOLE SODIUM 40 MG/1
40 TABLET, DELAYED RELEASE ORAL EVERY MORNING
COMMUNITY
End: 2022-11-04

## 2022-06-13 RX ORDER — OXYCODONE HYDROCHLORIDE 10 MG/1
10 TABLET ORAL EVERY 4 HOURS PRN
Status: DISCONTINUED | OUTPATIENT
Start: 2022-06-13 | End: 2022-06-13 | Stop reason: HOSPADM

## 2022-06-13 RX ORDER — NITROGLYCERIN 5 MG/ML
VIAL (ML) INTRAVENOUS
Status: DISCONTINUED | OUTPATIENT
Start: 2022-06-13 | End: 2022-06-13 | Stop reason: HOSPADM

## 2022-06-13 RX ORDER — NALOXONE HYDROCHLORIDE 0.4 MG/ML
0.2 INJECTION, SOLUTION INTRAMUSCULAR; INTRAVENOUS; SUBCUTANEOUS
Status: DISCONTINUED | OUTPATIENT
Start: 2022-06-13 | End: 2022-06-13 | Stop reason: HOSPADM

## 2022-06-13 RX ORDER — NALOXONE HYDROCHLORIDE 0.4 MG/ML
0.4 INJECTION, SOLUTION INTRAMUSCULAR; INTRAVENOUS; SUBCUTANEOUS
Status: DISCONTINUED | OUTPATIENT
Start: 2022-06-13 | End: 2022-06-13 | Stop reason: HOSPADM

## 2022-06-13 RX ORDER — NICARDIPINE HYDROCHLORIDE 2.5 MG/ML
INJECTION INTRAVENOUS
Status: DISCONTINUED | OUTPATIENT
Start: 2022-06-13 | End: 2022-06-13 | Stop reason: HOSPADM

## 2022-06-13 RX ORDER — HEPARIN SODIUM 1000 [USP'U]/ML
INJECTION, SOLUTION INTRAVENOUS; SUBCUTANEOUS
Status: DISCONTINUED | OUTPATIENT
Start: 2022-06-13 | End: 2022-06-13 | Stop reason: HOSPADM

## 2022-06-13 RX ORDER — FENTANYL CITRATE 50 UG/ML
25 INJECTION, SOLUTION INTRAMUSCULAR; INTRAVENOUS
Status: DISCONTINUED | OUTPATIENT
Start: 2022-06-13 | End: 2022-06-13 | Stop reason: HOSPADM

## 2022-06-13 RX ORDER — ATROPINE SULFATE 0.1 MG/ML
0.5 INJECTION INTRAVENOUS
Status: DISCONTINUED | OUTPATIENT
Start: 2022-06-13 | End: 2022-06-13 | Stop reason: HOSPADM

## 2022-06-13 RX ORDER — IOPAMIDOL 755 MG/ML
INJECTION, SOLUTION INTRAVASCULAR
Status: DISCONTINUED | OUTPATIENT
Start: 2022-06-13 | End: 2022-06-13 | Stop reason: HOSPADM

## 2022-06-13 RX ORDER — ASPIRIN 81 MG/1
243 TABLET, CHEWABLE ORAL ONCE
Status: COMPLETED | OUTPATIENT
Start: 2022-06-13 | End: 2022-06-13

## 2022-06-13 RX ORDER — ASPIRIN 325 MG
325 TABLET ORAL ONCE
Status: COMPLETED | OUTPATIENT
Start: 2022-06-13 | End: 2022-06-13

## 2022-06-13 RX ORDER — OXYCODONE HYDROCHLORIDE 5 MG/1
5 TABLET ORAL EVERY 4 HOURS PRN
Status: DISCONTINUED | OUTPATIENT
Start: 2022-06-13 | End: 2022-06-13 | Stop reason: HOSPADM

## 2022-06-13 RX ADMIN — POTASSIUM CHLORIDE 20 MEQ: 750 TABLET, EXTENDED RELEASE ORAL at 10:02

## 2022-06-13 RX ADMIN — ASPIRIN 325 MG ORAL TABLET 325 MG: 325 PILL ORAL at 10:02

## 2022-06-13 NOTE — PROGRESS NOTES
Patient arrived on 2A for CORS, RHC.  IV placed on right due to graft on left arm - limb alert applied.  ASA given.  Groin prepped, pulses marked, +1 bilaterally.  LMX to bilateral neck per patient request.  Consent done.  Prep complete.

## 2022-06-13 NOTE — DISCHARGE INSTRUCTIONS
Going Home after an Angiogram        After you go home:  Have an adult stay with you for 24 hours.  Drink plenty of fluids.  You may eat your normal diet, unless your doctor tells you otherwise.  For 24 hours:  - Relax and take it easy.  - Do NOT smoke.  - Do NOT make any important or legal decisions.  - Do NOT drive or operate machines at home or at work.  - Do NOT drink alcohol.    Remove the Band-Aid after 24 hours. If there is minor oozing, apply another Band-aid and remove it after 12 hours.  For 2 days, do NOT have sex or do any heavy exercise.  Do NOT take a bath, or use a hot tub or pool for at least 3 days. You may shower.    Care of wrist or arm site  It is normal to have soreness at the puncture site and mild tingling in your hand for up to 3 days.  For 2 days, do not use your hand or arm to support your weight (such as rising from a chair) or bend your wrist (such as lifting a garage door).  For 2 days, do not lift more than 5 pounds or exercise your arm (tennis, golf or bowling).      If you start bleeding from the site in your arm:  Sit down and press firmly on the site with your fingers for 10 minutes. Call your doctor as soon as you can.  If the bleeding stops, sit still and keep your wrist straight for 2 hours.  Medicines  If you have begun Plavix or Effient (with a stent), do not stop taking it until you talk to your heart doctor (cardiologist).  If you are on metformin (Glucophage), do not restart it until you have blood tests (within 2 to 3 days after discharge). When your doctor tells you it is safe, you may restart the metformin.  Call your doctor if:  You have a large or growing hard lump around the site.    The site is red, swollen, hot or tender.  Blood or fluid is draining from the site.  You have chills or a fever greater than 101 F (38 C).  Your arm turns bluish, feels numb or cool.  You have hives, a rash or unusual itching.  Call 911 right away if you have:   Bleeding that does not  stop.   Heavy bleeding.  [facility] [place]:  [phone] (7 days a week)

## 2022-06-13 NOTE — Clinical Note
Potential access sites were evaluated for patency using ultrasound.   The left jugular vein was selected. Access was obtained under with Sonosite and Fluoroscopic guidance using a micropuncture 21 gauge needle with direct visualization of needle entry.

## 2022-06-13 NOTE — PROGRESS NOTES
D/I/A:  Patient is tolerating liquids and foods, ambulating, urinating, puncture sites are stable (no bleeding and no hematoma) and patient has a .  A&Ox4 and making needs known.  CCL access site: R radial wrist and LIJ C/D/I; no bleeding or hematoma; CMS intact.  VSSA.  NSR on monitor.  IV access removed.  Education completed and outlined in AVS or handout: medications reviewed with patient. Educated patient on when to seek medical attention, activity restrictions, and follow-up appointments. Questions answered prior to discharge.  Belongings returned to patient at discharge.    P: Discharged to self care.  Patient to follow up with appts as per discharge instruction. Transported patient to front door via wheelchair.

## 2022-06-13 NOTE — PROGRESS NOTES
D/I/A: Pt roomed on 3C in bay [address].  Arrived via litter and accompanied by Anmol RN On/Off: Off monitor.  VSSA.  Rhythm upon arrival NSR on monitor.  Denies pain or sob.  Reviewed activity restrictions and when to notify RN, ie-changes to breathing or increased chest pressure or chest pain.  CCL access:  R radial wrist with TR band with 12cc's of air, CDI, CMS+, LIJ with bandaid CDI.  P: Continue to monitor status.  Discharge to home once meeting criteria.

## 2022-06-13 NOTE — Clinical Note
Potential access sites were evaluated for patency using ultrasound.   The right radial artery was selected. Access was obtained under with Sonosite and Fluoroscopic guidance using a micropuncture 21 gauge needle with direct visualization of needle entry.

## 2022-06-13 NOTE — Clinical Note
dry, intact, no bleeding and no hematoma. 6fr RRA removed TR band placed with 12 ml of air.  7fr LIJ sheath removed, manual pressure applied, hemostasis achieved, bandage placed.

## 2022-06-13 NOTE — PRE-PROCEDURE
GENERAL PRE-PROCEDURE:   Procedure:  Coronary angiogram with possible percutaneous coronary intervention, right heart catheterization  Date/Time:  6/13/2022 10:03 AM    Verbal consent obtained?: Yes    Written consent obtained?: Yes    Risks and benefits: Risks, benefits and alternatives were discussed    DC Plan: Appropriate discharge home plan in place for patients who are going home after procedure   Consent given by:  Patient  Patient states understanding of procedure being performed: Yes    Patient's understanding of procedure matches consent: Yes    Procedure consent matches procedure scheduled: Yes    Expected level of sedation:  Moderate  Appropriately NPO:  Yes  ASA Class:  2  Mallampati  :  Grade 2- soft palate, base of uvula, tonsillar pillars, and portion of posterior pharyngeal wall visible  Lungs:  Lungs clear with good breath sounds bilaterally  Heart:  Normal heart sounds and rate  History & Physical reviewed:  History and physical reviewed and no updates needed  Statement of review:  I have reviewed the lab findings, diagnostic data, medications, and the plan for sedation

## 2022-06-14 ENCOUNTER — LAB REQUISITION (OUTPATIENT)
Dept: LAB | Facility: CLINIC | Age: 67
End: 2022-06-14
Payer: MEDICARE

## 2022-06-14 ENCOUNTER — TELEPHONE (OUTPATIENT)
Dept: FAMILY MEDICINE | Facility: CLINIC | Age: 67
End: 2022-06-14
Payer: MEDICARE

## 2022-06-14 LAB
ATRIAL RATE - MUSE: 73 BPM
DIASTOLIC BLOOD PRESSURE - MUSE: NORMAL MMHG
DONOR IDENTIFICATION: NORMAL
DONOR IDENTIFICATION: NORMAL
DSA COMMENTS: NORMAL
DSA COMMENTS: NORMAL
DSA PRESENT: NO
DSA PRESENT: NO
DSA TEST METHOD: NORMAL
DSA TEST METHOD: NORMAL
EBV DNA # SPEC NAA+PROBE: NOT DETECTED COPIES/ML
INTERPRETATION ECG - MUSE: NORMAL
ORGAN: NORMAL
ORGAN: NORMAL
P AXIS - MUSE: 34 DEGREES
PR INTERVAL - MUSE: 188 MS
QRS DURATION - MUSE: 96 MS
QT - MUSE: 380 MS
QTC - MUSE: 418 MS
R AXIS - MUSE: 44 DEGREES
SA 1 CELL: NORMAL
SA 1 TEST METHOD: NORMAL
SA 2 CELL: NORMAL
SA 2 TEST METHOD: NORMAL
SA1 HI RISK ABY: NORMAL
SA1 MOD RISK ABY: NORMAL
SA2 HI RISK ABY: NORMAL
SA2 MOD RISK ABY: NORMAL
SYSTOLIC BLOOD PRESSURE - MUSE: NORMAL MMHG
T AXIS - MUSE: 33 DEGREES
UNACCEPTABLE ANTIGENS: NORMAL
UNOS CPRA: 0
VENTRICULAR RATE- MUSE: 73 BPM
ZZZSA 1  COMMENTS: NORMAL
ZZZSA 2 COMMENTS: NORMAL

## 2022-06-14 NOTE — TELEPHONE ENCOUNTER
Pt calling and states that he would like some directions on whether he can wash his hair. Please give pt a call back at [phone], ok to leave detailed message.    Kristal Clark,  [facility], [place]

## 2022-06-14 NOTE — TELEPHONE ENCOUNTER
S/w patient and gave the recommendation that it is best to keep the area dry for 7 full days, he can wash his hair if he needs to but try to avoid getting the bandage wet. Patient stated his son would be helping him do this.     Patient stated he has been doing daily dressing changes and applying Aquaphor. I told the patient that the bandage was to stay on for the full 7 days per the verbal directions of the nurse who did the dressing change. I recommended he keep the bandage he has on now for 2 more days, and once it is removed he can keep it off.     Patient expressed understanding and had no further questions.     Elena NELSON RN  [facility] [place]  [phone]

## 2022-06-15 LAB — IMMUKNOW IMMUNE CELL FUNCTION: 231 NG/ML

## 2022-06-15 RX ORDER — METFORMIN HCL 500 MG
TABLET, EXTENDED RELEASE 24 HR ORAL
Qty: 360 TABLET | Refills: 0 | OUTPATIENT
Start: 2022-06-15

## 2022-06-17 ENCOUNTER — ANESTHESIA EVENT (OUTPATIENT)
Dept: SURGERY | Facility: CLINIC | Age: 67
End: 2022-06-17
Payer: MEDICARE

## 2022-06-17 ENCOUNTER — PRE VISIT (OUTPATIENT)
Dept: SURGERY | Facility: CLINIC | Age: 67
End: 2022-06-17
Payer: MEDICARE

## 2022-06-17 ENCOUNTER — VIRTUAL VISIT (OUTPATIENT)
Dept: SURGERY | Facility: CLINIC | Age: 67
End: 2022-06-17
Payer: MEDICARE

## 2022-06-17 DIAGNOSIS — K43.9 VENTRAL HERNIA WITHOUT OBSTRUCTION OR GANGRENE: ICD-10-CM

## 2022-06-17 DIAGNOSIS — Z01.818 PREOP EXAMINATION: Primary | ICD-10-CM

## 2022-06-17 LAB
ALLOMAP SCORE (EXTERNAL): 36
NEGATIVE PREDICTIVE VALUE PERCENT (EXTERNAL): 98.7 %
POSITIVE PREDICTIVE VALUE PERCENT (EXTERNAL): 5.4 %

## 2022-06-17 PROCEDURE — 99205 OFFICE O/P NEW HI 60 MIN: CPT | Mod: 95 | Performed by: CLINICAL NURSE SPECIALIST

## 2022-06-17 RX ORDER — MULTIVITAMIN WITH IRON
1 TABLET ORAL EVERY MORNING
COMMUNITY
End: 2023-11-27

## 2022-06-17 RX ORDER — CALCIUM POLYCARBOPHIL 625 MG
TABLET ORAL EVERY MORNING
COMMUNITY
End: 2024-06-28

## 2022-06-17 ASSESSMENT — ENCOUNTER SYMPTOMS: SEIZURES: 0

## 2022-06-17 ASSESSMENT — PAIN SCALES - GENERAL: PAINLEVEL: NO PAIN (0)

## 2022-06-17 ASSESSMENT — LIFESTYLE VARIABLES: TOBACCO_USE: 1

## 2022-06-17 NOTE — PROGRESS NOTES
Murray is a 67 year old who is being evaluated via a billable video visit.      How would you like to obtain your AVS? MyChart      HPI         Review of Systems         Objective    Vitals - Patient Reported  Pain Score: No Pain (0)        Physical Exam       MAITE Escobar LPN      .  ..

## 2022-06-17 NOTE — PATIENT INSTRUCTIONS
Preparing for Your Surgery      Name:  Murray Nicholson   MRN:  [id number]   :  1955   Today's Date:  2022       Arriving for surgery:  Surgery date:  22  Arrival time:  10AM    Restrictions due to COVID 19       Effective 22 [facility] is implementing the following visitor policy:     1 person may accompany the patient through the Pre-Op process.      That same person may wait in the Surgery Waiting room, provided there is enough room to social distance         Inpatients are allowed 2 visitors per day for the duration of their stay.        Visitors must wear a mask.      Visitors must not be ill.      Visiting hours are 8 am to 8 pm.    [employer] parking is available for anyone with mobility limitations or disabilities.  ([place]  24 hours/ 7 days a week; [facility] [place]  7 am- 3:30 pm, Mon- Fri)    Please come to:     [facility] [address]  [address] [place] [address]    - ? parking is available in front of the hospital      -    Please proceed to [address] on the 3rd floor. [phone]?     - ?If you are in need of directions, wheelchair or escort please stop at the Information Desk in the lobby.  Inform the information person that you are here for surgery; a wheelchair and escort to [address] will be provided.?     What can I eat or drink?  -  You may eat and drink normally for up to 8 hours before your surgery. (Until 22, 4AM)  -  You may have clear liquids until 2 hours before surgery. (Until 22, 10AM)    Examples of clear liquids:  Water  Clear broth  Juices (apple, white grape, white cranberry  and cider) without pulp  Noncarbonated, powder based beverages  (lemonade and Santiago-Aid)  Sodas (Sprite, 7-Up, ginger ale and seltzer)  Coffee or tea (without milk or cream)  Gatorade    -  No Alcohol for at least 24 hours before surgery     Which medicines can I take?    Hold Multivitamins for 7 days before  surgery.  Hold Supplements, Co Q10, Biotin, Tart Cherry and Zinc for 7 days before surgery.  Hold Ibuprofen (Advil, Motrin) for 1 day before surgery--unless otherwise directed by surgeon.  Hold Naproxen (Aleve) for 4 days before surgery.    Hold all NSAIDS for 7 days before spine surgery (ibuprofen, naproxen, celebrex, indocin, diclofenac)    -  DO NOT take these medications the day of surgery:    Metformin(Glucophage)  Calcium(Fiber)    Calcium    Magnesium    -  PLEASE TAKE these medications the day of surgery:    Amlodipine(Norvasc)   Flonase nasal spray    Mycophenolate   Pantoprazole(Protonix)    Bactrim    Tacrolimus    How do I prepare myself?  - Please take 2 showers before surgery using Scrubcare or Hibiclens soap.    Use this soap only from the neck to your toes.     Leave the soap on your skin for one minute--then rinse thoroughly.      You may use your own shampoo and conditioner; no other hair products.   - Please remove all jewelry and body piercings.  - No lotions, deodorants or fragrance.  - Bring your ID and insurance card.    -If you have a Deep Brain Stimulator, Spinal Cord Stimulator or any neuro stimulator device---you must bring the remote control to the hospital         ALL PATIENTS GOING HOME THE SAME DAY OF SURGERY ARE REQUIRED TO HAVE A RESPONSIBLE ADULT TO DRIVE AND BE IN ATTENDANCE WITH THEM FOR 24 HOURS FOLLOWING SURGERY.      Questions or Concerns:    - For any questions regarding the day of surgery or your hospital stay, please contact the Pre Admission Nursing Office at [phone].       - If you have health changes between today and your surgery please call your surgeon.       For questions after surgery please call your surgeons office.

## 2022-06-17 NOTE — H&P
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 service today: YES    Video Start Time: 8:59am   Video End Time (time video stopped): 9:17am     Originating Location (pt. Location): Home    Distant Location (provider location):  [facility] PREOPERATIVE ASSESSMENT [facility]     Mode of Communication:  Video Conference via [employer]  On the day of service:     Prep time: 20 minutes  Visit time: 18 minutes  Documentation time: 25 minutes  ------------------------------------------  Total time: 63 minutes      VERONICA Martinez CNS  Preoperative Assessment Center  [facility]  Clinic and Surgery Center  Phone: [phone]  Fax: [phone]  "

## 2022-06-20 ENCOUNTER — OFFICE VISIT (OUTPATIENT)
Dept: CARDIOLOGY | Facility: CLINIC | Age: 67
End: 2022-06-20
Attending: INTERNAL MEDICINE
Payer: MEDICARE

## 2022-06-20 ENCOUNTER — LAB (OUTPATIENT)
Dept: LAB | Facility: CLINIC | Age: 67
End: 2022-06-20

## 2022-06-20 VITALS
OXYGEN SATURATION: 100 % | HEIGHT: 68 IN | BODY MASS INDEX: 26.64 KG/M2 | HEART RATE: 86 BPM | DIASTOLIC BLOOD PRESSURE: 88 MMHG | SYSTOLIC BLOOD PRESSURE: 128 MMHG

## 2022-06-20 DIAGNOSIS — Z94.1 HEART REPLACED BY TRANSPLANT (H): Primary | ICD-10-CM

## 2022-06-20 DIAGNOSIS — I10 PRIMARY HYPERTENSION: ICD-10-CM

## 2022-06-20 DIAGNOSIS — K43.9 VENTRAL HERNIA WITHOUT OBSTRUCTION OR GANGRENE: ICD-10-CM

## 2022-06-20 PROCEDURE — 99215 OFFICE O/P EST HI 40 MIN: CPT | Mod: GC | Performed by: INTERNAL MEDICINE

## 2022-06-20 PROCEDURE — U0003 INFECTIOUS AGENT DETECTION BY NUCLEIC ACID (DNA OR RNA); SEVERE ACUTE RESPIRATORY SYNDROME CORONAVIRUS 2 (SARS-COV-2) (CORONAVIRUS DISEASE [COVID-19]), AMPLIFIED PROBE TECHNIQUE, MAKING USE OF HIGH THROUGHPUT TECHNOLOGIES AS DESCRIBED BY CMS-2020-01-R: HCPCS | Performed by: SURGERY

## 2022-06-20 PROCEDURE — G0463 HOSPITAL OUTPT CLINIC VISIT: HCPCS

## 2022-06-20 ASSESSMENT — PAIN SCALES - GENERAL: PAINLEVEL: NO PAIN (0)

## 2022-06-20 ASSESSMENT — ENCOUNTER SYMPTOMS: NEW SYMPTOMS OF CORONARY ARTERY DISEASE: 0

## 2022-06-20 NOTE — LETTER
2022      RE: Murray Nicholson  [address]  [place] [address]       Dear Colleague,    Thank you for the opportunity to participate in the care of your patient, Murray Nicholson, at the [facility] HEART [facility] [place] at [facility]. Please see a copy of my visit note below.    Cardiology Clinic Note     Yahir Turcios MD    [facility]    [address] [id number]       Ana Luisa Mcpherson MD    [facility]    [address] [facility] [address]    [place]  [address]       RE: Murray Nicholson   MRN: [id number]   : 1955      Dear Dr. Turcios and Dr. Mcpherson:      We had the pleasure of seeing Mr. Murray Nicholson for followup in our Cardiac Transplant Clinic at the [facility].  As you know, he is a very pleasant 67-year-old male who underwent orthotopic heart transplantation on 2018 and kidney transplant in 2019. His current problem list includes:     1.  Status post orthotopic heart transplantation.   2.  Neutropenia  3.  Oral mucosal ulcer secondary to neutropenia with Klebsiella infection.   4.  Pseudomonas bacteremia, resolved.   5.  Perforation of the small bowel, status post small-bowel resection and ileostomy.   6.  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   7.  Hypertension.   8.  Hyperlipidemia.   9.  End-stage renal disease requiring hemodialysis - S/P Kidney transplant    He is returning today for his 4-year annual follow-up.  He had his kidney transplantation in 2019.  He is overall doing very well.  He has no specific complaints.  He started a part-time job which he has been enjoying it. No recent hospitalizations or ER visits.  He is compliant with his medications.    Is getting a hernia operation Friday.    Current Outpatient Medications   Medication Sig     amLODIPine (NORVASC) 2.5 MG tablet Take 2 tablets (5 mg) by mouth daily (Patient taking  differently: Take 5 mg by mouth every morning)     aspirin 81 MG EC tablet Take 81 mg by mouth every evening     atorvastatin (LIPITOR) 40 MG tablet TAKE 1 TABLET(40 MG) BY MOUTH DAILY (Patient taking differently: Take 40 mg by mouth every evening)     biotin 1000 MCG TABS tablet Take 1,000 mcg by mouth every morning Patient takes every other day     blood glucose (ACCU-CHEK GUIDE) test strip Use to test blood sugar 3 times daily or as directed.     blood glucose monitoring (ACCU-CHEK FASTCLIX) lancets USE TO TEST 3 TIMES DAILY OR AS DIRECTED.     calcium citrate and vitamin D (CITRACAL) 200-250 MG-UNIT TABS per tablet Take 1 tablet by mouth 2 times daily     Calcium Polycarbophil (FIBER) 625 MG tablet Take by mouth every morning     co-enzyme Q-10 30 MG CAPS capsule Take by mouth every morning     fluticasone (FLONASE) 50 MCG/ACT nasal spray SHAKE LIQUID AND USE 1 SPRAY IN EACH NOSTRIL DAILY (Patient taking differently: every evening SHAKE LIQUID AND USE 1 SPRAY IN EACH NOSTRIL DAILY)     loratadine (CLARITIN) 10 MG tablet Take 10 mg by mouth every evening Patient takes at bedtime     magnesium 250 MG tablet Take 1 tablet by mouth every morning     metFORMIN (GLUCOPHAGE XR) 500 MG 24 hr tablet Take 4 tablets (2,000 mg) by mouth daily (with breakfast)     Misc Natural Products (TART CHERRY ADVANCED PO) Take by mouth every morning     Multiple Vitamins-Minerals (HAIR SKIN NAILS PO) Take by mouth every morning     multivitamin (CENTRUM SILVER) tablet Take 1 tablet by mouth every morning     mycophenolate (GENERIC EQUIVALENT) 250 MG capsule Take 3 capsules (750 mg) by mouth 2 times daily     OMEPRAZOLE PO Take 20 mg by mouth every evening     pantoprazole (PROTONIX) 40 MG EC tablet Take 40 mg by mouth every morning     sulfamethoxazole-trimethoprim (BACTRIM) 400-80 MG tablet Take 1 tablet by mouth daily (Patient taking differently: Take 1 tablet by mouth every morning)     tacrolimus (GENERIC EQUIVALENT) 0.5 MG capsule  Take ONE cap every PM (0.5 mg every evening) (Patient taking differently: Take by mouth every evening Take ONE cap every PM (0.5 mg every evening))     tacrolimus (GENERIC EQUIVALENT) 1 MG capsule TAKE 1 CAPSULE BY MOUTH EVERY MORNING (Patient taking differently: Take 1 mg by mouth every morning TAKE 1 CAPSULE BY MOUTH EVERY MORNING)     Zinc Sulfate (ZINC 15 PO) Take by mouth every morning     No current facility-administered medications for this visit.     Facility-Administered Medications Ordered in Other Visits   Medication     diatrizoate meglumine-sodium (GASTROGRAFIN/GASTROVIEW) 66-10 % solution 480 mL        REVIEW OF SYSTEMS:  A detailed 10-point review of systems was obtained as described in History of Present Illness.  All other systems are reviewed and are negative.      PHYSICAL EXAMINATION:   There were no vitals taken for this visit.  He was awake, alert, oriented x3.  He was in no apparent distress.  He was comfortable.  He had no pallor, cyanosis or jaundice.  He had no jugular venous distention.  His carotids were 2+ bilaterally.  Cardiac auscultation revealed normal S1 and S2 with no murmur rub or gallop.  Auscultation of his lungs revealed equal air entry on both sides with no added sounds.  His abdomen was soft with no guarding no tenderness no rigidity no guarding.  He had no focal neurological deficit.  His extremities showed no edema.    Testing:    EKG (06/2022)  Interpretation Summary  Global and regional left ventricular function is normal with an EF of 60-65%.  Right ventricular function, chamber size, wall motion, and thickness are normal.  Pulmonary artery systolic pressure cannot be assessed.  The inferior vena cava is normal.  No pericardial effusion is present.  No significant changes noted.     Cardiac MRI (10/12/2021)  Clinical history: 66 M heart transplant, normal echo  Comparison CMR: 07/2020     1. The LV is normal in cavity size and wall thickness. The global systolic function  is normal. The LVEF is 66%. There are no regional wall motion abnormalities.     2. The RV is normal in cavity size. The global systolic function is normal. The RVEF is 67%.      3. Both atria are dilated from transplantation.     4. There is no significant valvular disease.      5. Late gadolinium enhancement imaging shows no MI, fibrosis or infiltrative disease.      6. Regadenoson stress perfusion imaging shows no ischemia.     7. There is no pericardial effusion or thickening.     8. There is no intracardiac thrombus.     9. Native distal ascending aorta distal to anastomosis is mildly dilated measuring 4.1 cm.      CONCLUSIONS: Heart transplant with normal graft function and no ischemia or fibrosis. No change from prior study.      CORE EXAM.     RHC (06/2022)  RA 3/3/3  RV 18/3  PA 18/7/11  PCW 4/5/4  CO Kassi 4.81   CI 2.48    Coronary Angiogram (06/2022):    Prox LAD lesion is 40% stenosed.    1st Mrg lesion is 50% stenosed.    2nd Mrg-2 lesion is 70% stenosed.    2nd Mrg-1 lesion is 60% stenosed.    Prox RCA lesion is 20% stenosed.    Dist RCA lesion is 40% stenosed.    RPDA lesion is 20% stenosed.      ALLOMAP: 36 (stable) (6/13/22)     DSA:  Lab Results   Component Value Date    [facility] 06/13/2022    [facility] 07/06/2021    SAICELL Class I 06/13/2022    SAICELL Class I 07/06/2021    [id number] None 06/13/2022    [id number] None 07/06/2021    [id number] None 06/13/2022    [id number] None 07/06/2021    SAIREPCOM  06/13/2022      Test performed by modified procedure. Serum heat inactivated and tested by a modified ([place]) protocol including fetal calf serum addition. High-risk, mfi >3,000. Mod-risk, mfi 500-3,000.    SAIREPCOM  07/06/2021      Test performed by modified procedure. Serum heat inactivated and tested   by a modified ([place]) protocol including fetal calf serum addition.   High-risk, mfi >3,000. Mod-risk, mfi 500-3,000.       Lab Results   Component Value Date    [facility]  06/[facility]    SAIITESTME SA FCS 07/06/2021    SAIICELL Class II 06/13/2022    SAIICELL Class II 07/06/2021    [id number] None 06/13/2022    [id number] None 07/06/2021    SK4JVCOLNM DP:20 06/13/2022    [id number] None 07/06/2021    SAIIREPCOM  06/13/2022      Test performed by modified procedure. Serum heat inactivated and tested by a modified ([place]) protocol including fetal calf serum addition. High-risk, mfi >3,000. Mod-risk, mfi 500-3,000.    SAIIREPCOM  07/06/2021      Test performed by modified procedure. Serum heat inactivated and tested   by a modified ([place]) protocol including fetal calf serum addition.   High-risk, mfi >3,000. Mod-risk, mfi 500-3,000.         IMMUNOSUPPRESSANT LEVELS:  Lab Results   Component Value Date    TACROL 5.7 06/13/2022    TACROL 6.8 07/06/2021    DOSTAC 6/12/2022 06/13/2022    DOSTAC 925PM 7/5/21 07/06/2021       Lab Results   Component Value Date    CSPEC Plasma 06/07/2021   .  PSA   Date Value Ref Range Status   06/07/2021 2.50 0 - 4 ug/L Final     Comment:     Assay Method:  Chemiluminescence using Siemens Vista analyzer     Prostate Specific Antigen Screen   Date Value Ref Range Status   06/13/2022 2.67 0.00 - 4.00 ug/L Final     TSH   Date Value Ref Range Status   09/07/2021 1.89 0.40 - 4.00 mU/L Final   04/16/2018 2.25 0.40 - 4.00 mU/L Final     CMV/EBV - negative    CBC RESULTS:   Recent Labs   Lab Test 06/10/21  1056   WBC 4.3   RBC 4.21*   HGB 12.6*   HCT 37.9*   MCV 90   MCH 29.9   MCHC 33.2   RDW 13.1        Recent Labs   Lab Test 06/10/21  1056 06/07/21  0834    139   POTASSIUM 4.1 4.2   CHLORIDE 108 106   CO2 23 30   ANIONGAP 7 3   * 125*   BUN 15 14   CR 1.01 1.04   TRINI 9.3 9.4     Liver Function Studies -   Recent Labs   Lab Test 06/07/21  0834   PROTTOTAL 7.3   ALBUMIN 3.8   BILITOTAL 1.6*   ALKPHOS 101   AST 27   ALT 28         ASSESSMENT AND PLAN:    Mr. Murray Nicholson is a 66-year-old male status post orthotopic heart transplantation in June with  a very complex postoperative course who returns today for followup.     #Orthotopic Heart Transplant (06/2018)     Graft Function: Normal.    No evidence of rejection. No biopsy now but Allomap has been stable. No DSA.     CAV PPx: Patient has a single obstructive coronary artery disease of the OM2 with sequential 80% stenoses. This continues to be unchanged from his prior angiogram that was done 30 days after his transplant. Likely donor disease. Will continue ASA 81mg and Atorvastatin 40mg daily. He has non obstructive disease on his LAD and RCA as well. LDL 35. Elevated triglycerides, discussed need for dietary improvement.     Immunosuppression: Continue Tacrolimus with a goal of 4-6 and  mg bid. Lower dose of Cellcept due to neutropenia in the past     #Hypertension. Off all antihypertensive medications after his renal transplant. BP under control. Will continue to monitor.      #Kidney transplant   Followed by transplant nephrology, on prophylactic Bactrim, renal function stable    # High risk for CMV.  Negative    #Diabetes - On metformin and sliding scale insulin. Followed by endocrinology. A1c 7.0     RTC in 6 months with echo and Allomap. Patient will call in the interim if he has any worsening symptoms.     Bobby Mackey MD   Cardiology Fellow, PGY-5  June 20, 2022  2:43 PM     I examined the patient and agree with the assessment and plan of Dr. Mackey.    Total time today was 45 minutes reviewing notes, imaging, labs, patient visit, orders and documentation         Klever Ernst MD   [facility]  Heart Failure, Transplant, and Mechanical Circulatory Support Cardiology   Cardiovascular Division  [facility]   [phone]

## 2022-06-20 NOTE — NURSING NOTE
Chief Complaint   Patient presents with     Follow Up     Return heart txp       Vitals were taken and medications reconciled.    Fantasma Newberry, EMT  1:49 PM

## 2022-06-20 NOTE — NURSING NOTE
Transplant Coordinator Note  Reason for visit:  4th year transplant annual     Health concerns addressed today:  Pt seen in clinic with Dr Ernst. MD reviewed meds, labs, and testing. Angio stable. MD discussed importance of diet, exercise, DM management, ASA and statin. Pt doing well; working PT. Hernia surgery 6/24.      Immunosuppressants   mg BID   FK 1/0.5 mg; goal 5-7; level 5.7   No DSAs  Immuknow 258, 135, 218, 231 .  Allomaps 36      Routine screenings:    Derm: UTD  Dental: Due  Colonoscopy: 2018 (diverticuli only)  Prostate: PSA 2.67   Eye: Scheduled  Pneumonia 12 and 23 UTD:   2022 flu vaccine due this fall    Shingrix 2/2   COVID 4/5 Evusheld done      Medication record reviewed and reconciled. Questions and concerns addressed. AVS reviewed and copy available in [employer]. Pt verbalized an understanding of plan of care.      Patient Instructions  ~Please call your coordinator at [phone] with any questions or concerns.    ~Flu and COVID vaccine this fall   ~Labs per renal; full labs in December 2022   ~Dental  - please schedule appt  ~Eye - follow up in July   ~Lose some weight, lower fat diet and diabetes control.   ~Return in one year; sooner if needed.

## 2022-06-20 NOTE — PATIENT INSTRUCTIONS
Please call your coordinator at [phone] with any questions or concerns.      Flu and COVID vaccine this fall     Labs per renal; full labs in December 2022     Dental  - please schedule appt    Eye - follow up in July     Lose some weight, lower fat diet and diabetes control.     Return in one year; sooner if needed.

## 2022-06-20 NOTE — PROGRESS NOTES
Cardiology Clinic Note     Yahir Turcios MD    [facility]    [address] [place]  [address]       Ana Luisa Mcpherson MD    [facility]    [address] [facility] [address]    [place]  [address]       RE: Murray Nicholson   MRN: [id number]   : 1955      Dear Dr. Turcios and Dr. Mcpherson:      We had the pleasure of seeing Mr. Murray Nicholson for followup in our Cardiac Transplant Clinic at the [facility].  As you know, he is a very pleasant 67-year-old male who underwent orthotopic heart transplantation on 2018 and kidney transplant in 2019. His current problem list includes:     1.  Status post orthotopic heart transplantation.   2.  Neutropenia  3.  Oral mucosal ulcer secondary to neutropenia with Klebsiella infection.   4.  Pseudomonas bacteremia, resolved.   5.  Perforation of the small bowel, status post small-bowel resection and ileostomy.   6.  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   7.  Hypertension.   8.  Hyperlipidemia.   9.  End-stage renal disease requiring hemodialysis - S/P Kidney transplant    He is returning today for his 4-year annual follow-up.  He had his kidney transplantation in 2019.  He is overall doing very well.  He has no specific complaints.  He started a part-time job which he has been enjoying it. No recent hospitalizations or ER visits.  He is compliant with his medications.    Is getting a hernia operation Friday.    Current Outpatient Medications   Medication Sig     amLODIPine (NORVASC) 2.5 MG tablet Take 2 tablets (5 mg) by mouth daily (Patient taking differently: Take 5 mg by mouth every morning)     aspirin 81 MG EC tablet Take 81 mg by mouth every evening     atorvastatin (LIPITOR) 40 MG tablet TAKE 1 TABLET(40 MG) BY MOUTH DAILY (Patient taking differently: Take 40 mg by mouth every evening)     biotin 1000 MCG TABS tablet Take 1,000 mcg by mouth every morning Patient takes every other day     blood  glucose (ACCU-CHEK GUIDE) test strip Use to test blood sugar 3 times daily or as directed.     blood glucose monitoring (ACCU-CHEK FASTCLIX) lancets USE TO TEST 3 TIMES DAILY OR AS DIRECTED.     calcium citrate and vitamin D (CITRACAL) 200-250 MG-UNIT TABS per tablet Take 1 tablet by mouth 2 times daily     Calcium Polycarbophil (FIBER) 625 MG tablet Take by mouth every morning     co-enzyme Q-10 30 MG CAPS capsule Take by mouth every morning     fluticasone (FLONASE) 50 MCG/ACT nasal spray SHAKE LIQUID AND USE 1 SPRAY IN EACH NOSTRIL DAILY (Patient taking differently: every evening SHAKE LIQUID AND USE 1 SPRAY IN EACH NOSTRIL DAILY)     loratadine (CLARITIN) 10 MG tablet Take 10 mg by mouth every evening Patient takes at bedtime     magnesium 250 MG tablet Take 1 tablet by mouth every morning     metFORMIN (GLUCOPHAGE XR) 500 MG 24 hr tablet Take 4 tablets (2,000 mg) by mouth daily (with breakfast)     Misc Natural Products (TART CHERRY ADVANCED PO) Take by mouth every morning     Multiple Vitamins-Minerals (HAIR SKIN NAILS PO) Take by mouth every morning     multivitamin (CENTRUM SILVER) tablet Take 1 tablet by mouth every morning     mycophenolate (GENERIC EQUIVALENT) 250 MG capsule Take 3 capsules (750 mg) by mouth 2 times daily     OMEPRAZOLE PO Take 20 mg by mouth every evening     pantoprazole (PROTONIX) 40 MG EC tablet Take 40 mg by mouth every morning     sulfamethoxazole-trimethoprim (BACTRIM) 400-80 MG tablet Take 1 tablet by mouth daily (Patient taking differently: Take 1 tablet by mouth every morning)     tacrolimus (GENERIC EQUIVALENT) 0.5 MG capsule Take ONE cap every PM (0.5 mg every evening) (Patient taking differently: Take by mouth every evening Take ONE cap every PM (0.5 mg every evening))     tacrolimus (GENERIC EQUIVALENT) 1 MG capsule TAKE 1 CAPSULE BY MOUTH EVERY MORNING (Patient taking differently: Take 1 mg by mouth every morning TAKE 1 CAPSULE BY MOUTH EVERY MORNING)     Zinc Sulfate (ZINC  15 PO) Take by mouth every morning     No current facility-administered medications for this visit.     Facility-Administered Medications Ordered in Other Visits   Medication     diatrizoate meglumine-sodium (GASTROGRAFIN/GASTROVIEW) 66-10 % solution 480 mL        REVIEW OF SYSTEMS:  A detailed 10-point review of systems was obtained as described in History of Present Illness.  All other systems are reviewed and are negative.      PHYSICAL EXAMINATION:   There were no vitals taken for this visit.  He was awake, alert, oriented x3.  He was in no apparent distress.  He was comfortable.  He had no pallor, cyanosis or jaundice.  He had no jugular venous distention.  His carotids were 2+ bilaterally.  Cardiac auscultation revealed normal S1 and S2 with no murmur rub or gallop.  Auscultation of his lungs revealed equal air entry on both sides with no added sounds.  His abdomen was soft with no guarding no tenderness no rigidity no guarding.  He had no focal neurological deficit.  His extremities showed no edema.    Testing:    EKG (06/2022)  Interpretation Summary  Global and regional left ventricular function is normal with an EF of 60-65%.  Right ventricular function, chamber size, wall motion, and thickness are normal.  Pulmonary artery systolic pressure cannot be assessed.  The inferior vena cava is normal.  No pericardial effusion is present.  No significant changes noted.     Cardiac MRI (10/12/2021)  Clinical history: 66 M heart transplant, normal echo  Comparison CMR: 07/2020     1. The LV is normal in cavity size and wall thickness. The global systolic function is normal. The LVEF is 66%. There are no regional wall motion abnormalities.     2. The RV is normal in cavity size. The global systolic function is normal. The RVEF is 67%.      3. Both atria are dilated from transplantation.     4. There is no significant valvular disease.      5. Late gadolinium enhancement imaging shows no MI, fibrosis or infiltrative  disease.      6. Regadenoson stress perfusion imaging shows no ischemia.     7. There is no pericardial effusion or thickening.     8. There is no intracardiac thrombus.     9. Native distal ascending aorta distal to anastomosis is mildly dilated measuring 4.1 cm.      CONCLUSIONS: Heart transplant with normal graft function and no ischemia or fibrosis. No change from prior study.      CORE EXAM.     [facility] (06/2022)  RA 3/3/3  RV 18/3  PA 18/7/11  PCW 4/5/4  CO Kassi 4.81   CI 2.48    Coronary Angiogram (06/2022):    Prox LAD lesion is 40% stenosed.    1st Mrg lesion is 50% stenosed.    2nd Mrg-2 lesion is 70% stenosed.    2nd Mrg-1 lesion is 60% stenosed.    Prox RCA lesion is 20% stenosed.    Dist RCA lesion is 40% stenosed.    RPDA lesion is 20% stenosed.      ALLOMAP: 36 (stable) (6/13/22)     DSA:  Lab Results   Component Value Date    SAITESET [facility] 06/13/2022    SAITESET [facility] 07/06/2021    SAICELL Class I 06/13/2022    SAICELL Class I 07/06/2021    [id number] None 06/13/2022    AT1JNNQSA None 07/06/2021    YU0OGALTWK None 06/13/2022    XV0KZDXNEL None 07/06/2021    SAIREPCOM  06/13/2022      Test performed by modified procedure. Serum heat inactivated and tested by a modified ([place]) protocol including fetal calf serum addition. High-risk, mfi >3,000. Mod-risk, mfi 500-3,000.    SAIREPCOM  07/06/2021      Test performed by modified procedure. Serum heat inactivated and tested   by a modified ([place]) protocol including fetal calf serum addition.   High-risk, mfi >3,000. Mod-risk, mfi 500-3,000.       Lab Results   Component Value Date    SAIITESTME [facility] 06/13/2022    SAIITESTME [facility] 07/06/2021    SAIICELL Class II 06/13/2022    SAIICELL Class II 07/06/2021    [id number] None 06/13/2022    EJ3LEQCKP None 07/06/2021    VX9PDFHWDA DP:20 06/13/2022    HU8BVUJPDO None 07/06/2021    SAIIREPCOM  06/13/2022      Test performed by modified procedure. Serum heat inactivated and tested by a modified ([place])  protocol including fetal calf serum addition. High-risk, mfi >3,000. Mod-risk, mfi 500-3,000.    SAIIREPCOM  07/06/2021      Test performed by modified procedure. Serum heat inactivated and tested   by a modified ([place]) protocol including fetal calf serum addition.   High-risk, mfi >3,000. Mod-risk, mfi 500-3,000.         IMMUNOSUPPRESSANT LEVELS:  Lab Results   Component Value Date    TACROL 5.7 06/13/2022    TACROL 6.8 07/06/2021    DOSTAC 6/12/2022 06/13/2022    DOSTAC 925PM 7/5/21 07/06/2021       Lab Results   Component Value Date    CSPEC Plasma 06/07/2021   .  PSA   Date Value Ref Range Status   06/07/2021 2.50 0 - 4 ug/L Final     Comment:     Assay Method:  Chemiluminescence using Siemens Vista analyzer     Prostate Specific Antigen Screen   Date Value Ref Range Status   06/13/2022 2.67 0.00 - 4.00 ug/L Final     TSH   Date Value Ref Range Status   09/07/2021 1.89 0.40 - 4.00 mU/L Final   04/16/2018 2.25 0.40 - 4.00 mU/L Final     CMV/EBV - negative    CBC RESULTS:   Recent Labs   Lab Test 06/10/21  1056   WBC 4.3   RBC 4.21*   HGB 12.6*   HCT 37.9*   MCV 90   MCH 29.9   MCHC 33.2   RDW 13.1        Recent Labs   Lab Test 06/10/21  1056 06/07/21  0834    139   POTASSIUM 4.1 4.2   CHLORIDE 108 106   CO2 23 30   ANIONGAP 7 3   * 125*   BUN 15 14   CR 1.01 1.04   TRINI 9.3 9.4     Liver Function Studies -   Recent Labs   Lab Test 06/07/21  0834   PROTTOTAL 7.3   ALBUMIN 3.8   BILITOTAL 1.6*   ALKPHOS 101   AST 27   ALT 28         ASSESSMENT AND PLAN:    Mr. Murray Nicholson is a 66-year-old male status post orthotopic heart transplantation in June with a very complex postoperative course who returns today for followup.     #Orthotopic Heart Transplant (06/2018)     Graft Function: Normal.    No evidence of rejection. No biopsy now but Allomap has been stable. No DSA.     CAV PPx: Patient has a single obstructive coronary artery disease of the OM2 with sequential 80% stenoses. This continues to be  unchanged from his prior angiogram that was done 30 days after his transplant. Likely donor disease. Will continue ASA 81mg and Atorvastatin 40mg daily. He has non obstructive disease on his LAD and RCA as well. LDL 35. Elevated triglycerides, discussed need for dietary improvement.     Immunosuppression: Continue Tacrolimus with a goal of 4-6 and  mg bid. Lower dose of Cellcept due to neutropenia in the past     #Hypertension. Off all antihypertensive medications after his renal transplant. BP under control. Will continue to monitor.      #Kidney transplant   Followed by transplant nephrology, on prophylactic Bactrim, renal function stable    # High risk for CMV.  Negative    #Diabetes - On metformin and sliding scale insulin. Followed by endocrinology. A1c 7.0     RTC in 6 months with echo and Allomap. Patient will call in the interim if he has any worsening symptoms.     Bobby Mackey MD   Cardiology Fellow, PGY-5  June 20, 2022  2:43 PM     I examined the patient and agree with the assessment and plan of Dr. Mackey.    Total time today was 45 minutes reviewing notes, imaging, labs, patient visit, orders and documentation         Klever Ernst MD   [facility]  Heart Failure, Transplant, and Mechanical Circulatory Support Cardiology   Cardiovascular Division  [facility]   [phone]

## 2022-06-21 LAB — SARS-COV-2 RNA RESP QL NAA+PROBE: NEGATIVE

## 2022-06-21 RX ORDER — AMLODIPINE BESYLATE 5 MG/1
5 TABLET ORAL DAILY
Qty: 90 TABLET | Refills: 3 | Status: SHIPPED | OUTPATIENT
Start: 2022-06-21 | End: 2023-08-25

## 2022-06-23 ASSESSMENT — LIFESTYLE VARIABLES: TOBACCO_USE: 1

## 2022-06-23 ASSESSMENT — ENCOUNTER SYMPTOMS: SEIZURES: 0

## 2022-06-24 ENCOUNTER — HOSPITAL ENCOUNTER (OUTPATIENT)
Facility: CLINIC | Age: 67
Discharge: HOME | End: 2022-06-24
Attending: SURGERY | Admitting: SURGERY
Payer: MEDICARE

## 2022-06-24 ENCOUNTER — ANESTHESIA (OUTPATIENT)
Dept: SURGERY | Facility: CLINIC | Age: 67
End: 2022-06-24
Payer: MEDICARE

## 2022-06-24 VITALS
RESPIRATION RATE: 16 BRPM | SYSTOLIC BLOOD PRESSURE: 141 MMHG | WEIGHT: 181 LBS | DIASTOLIC BLOOD PRESSURE: 97 MMHG | HEIGHT: 68 IN | BODY MASS INDEX: 27.43 KG/M2 | OXYGEN SATURATION: 100 % | TEMPERATURE: 98 F | HEART RATE: 90 BPM

## 2022-06-24 DIAGNOSIS — Z98.890 HISTORY OF HERNIA REPAIR: Primary | ICD-10-CM

## 2022-06-24 DIAGNOSIS — Z87.19 HISTORY OF HERNIA REPAIR: Primary | ICD-10-CM

## 2022-06-24 LAB — GLUCOSE BLDC GLUCOMTR-MCNC: 188 MG/DL (ref 70–99)

## 2022-06-24 PROCEDURE — 250N000009 HC RX 250: Performed by: NURSE ANESTHETIST, CERTIFIED REGISTERED

## 2022-06-24 PROCEDURE — 250N000011 HC RX IP 250 OP 636: Performed by: NURSE ANESTHETIST, CERTIFIED REGISTERED

## 2022-06-24 PROCEDURE — 710N000009 HC RECOVERY PHASE 1, LEVEL 1, PER MIN: Performed by: SURGERY

## 2022-06-24 PROCEDURE — 272N000001 HC OR GENERAL SUPPLY STERILE: Performed by: SURGERY

## 2022-06-24 PROCEDURE — 999N000141 HC STATISTIC PRE-PROCEDURE NURSING ASSESSMENT: Performed by: SURGERY

## 2022-06-24 PROCEDURE — 250N000011 HC RX IP 250 OP 636: Performed by: STUDENT IN AN ORGANIZED HEALTH CARE EDUCATION/TRAINING PROGRAM

## 2022-06-24 PROCEDURE — 49568 PR REPAIR HERNIA WITH MESH: CPT | Performed by: SURGERY

## 2022-06-24 PROCEDURE — 250N000013 HC RX MED GY IP 250 OP 250 PS 637

## 2022-06-24 PROCEDURE — 370N000017 HC ANESTHESIA TECHNICAL FEE, PER MIN: Performed by: SURGERY

## 2022-06-24 PROCEDURE — 710N000012 HC RECOVERY PHASE 2, PER MINUTE: Performed by: SURGERY

## 2022-06-24 PROCEDURE — C1781 MESH (IMPLANTABLE): HCPCS | Performed by: SURGERY

## 2022-06-24 PROCEDURE — 250N000011 HC RX IP 250 OP 636: Performed by: SURGERY

## 2022-06-24 PROCEDURE — 250N000009 HC RX 250: Performed by: SURGERY

## 2022-06-24 PROCEDURE — 250N000011 HC RX IP 250 OP 636

## 2022-06-24 PROCEDURE — 258N000003 HC RX IP 258 OP 636: Performed by: NURSE ANESTHETIST, CERTIFIED REGISTERED

## 2022-06-24 PROCEDURE — 250N000025 HC SEVOFLURANE, PER MIN: Performed by: SURGERY

## 2022-06-24 PROCEDURE — 82962 GLUCOSE BLOOD TEST: CPT

## 2022-06-24 PROCEDURE — C9290 INJ, BUPIVACAINE LIPOSOME: HCPCS | Performed by: STUDENT IN AN ORGANIZED HEALTH CARE EDUCATION/TRAINING PROGRAM

## 2022-06-24 PROCEDURE — 258N000003 HC RX IP 258 OP 636

## 2022-06-24 PROCEDURE — 360N000076 HC SURGERY LEVEL 3, PER MIN: Performed by: SURGERY

## 2022-06-24 PROCEDURE — 271N000001 HC OR GENERAL SUPPLY NON-STERILE: Performed by: SURGERY

## 2022-06-24 PROCEDURE — 49565 PR REPAIR RECURR INCIS HERNIA,REDUC: CPT | Performed by: SURGERY

## 2022-06-24 DEVICE — SELF-GRIPPING POLYESTER MESH,POLYESTER WITH POLYLACTIC ACID GRIPS
Type: IMPLANTABLE DEVICE | Site: ABDOMEN | Status: FUNCTIONAL
Brand: PROGRIP

## 2022-06-24 RX ORDER — NALOXONE HYDROCHLORIDE 0.4 MG/ML
0.4 INJECTION, SOLUTION INTRAMUSCULAR; INTRAVENOUS; SUBCUTANEOUS
Status: DISCONTINUED | OUTPATIENT
Start: 2022-06-24 | End: 2022-06-27 | Stop reason: HOSPADM

## 2022-06-24 RX ORDER — LIDOCAINE 40 MG/G
CREAM TOPICAL
Status: CANCELLED | OUTPATIENT
Start: 2022-06-24

## 2022-06-24 RX ORDER — TRAMADOL HYDROCHLORIDE 50 MG/1
50 TABLET ORAL EVERY 4 HOURS PRN
Qty: 20 TABLET | Refills: 0 | Status: SHIPPED | OUTPATIENT
Start: 2022-06-24 | End: 2022-06-27

## 2022-06-24 RX ORDER — ONDANSETRON 2 MG/ML
4 INJECTION INTRAMUSCULAR; INTRAVENOUS EVERY 30 MIN PRN
Status: DISCONTINUED | OUTPATIENT
Start: 2022-06-24 | End: 2022-06-24 | Stop reason: HOSPADM

## 2022-06-24 RX ORDER — HYDRALAZINE HYDROCHLORIDE 20 MG/ML
2.5-5 INJECTION INTRAMUSCULAR; INTRAVENOUS EVERY 10 MIN PRN
Status: DISCONTINUED | OUTPATIENT
Start: 2022-06-24 | End: 2022-06-24 | Stop reason: HOSPADM

## 2022-06-24 RX ORDER — FENTANYL CITRATE 50 UG/ML
50 INJECTION, SOLUTION INTRAMUSCULAR; INTRAVENOUS EVERY 5 MIN PRN
Status: DISCONTINUED | OUTPATIENT
Start: 2022-06-24 | End: 2022-06-24 | Stop reason: HOSPADM

## 2022-06-24 RX ORDER — CLINDAMYCIN PHOSPHATE 900 MG/50ML
900 INJECTION, SOLUTION INTRAVENOUS SEE ADMIN INSTRUCTIONS
Status: DISCONTINUED | OUTPATIENT
Start: 2022-06-24 | End: 2022-06-24 | Stop reason: HOSPADM

## 2022-06-24 RX ORDER — LIDOCAINE HYDROCHLORIDE 20 MG/ML
INJECTION, SOLUTION INFILTRATION; PERINEURAL PRN
Status: DISCONTINUED | OUTPATIENT
Start: 2022-06-24 | End: 2022-06-24

## 2022-06-24 RX ORDER — SODIUM CHLORIDE, SODIUM LACTATE, POTASSIUM CHLORIDE, CALCIUM CHLORIDE 600; 310; 30; 20 MG/100ML; MG/100ML; MG/100ML; MG/100ML
INJECTION, SOLUTION INTRAVENOUS CONTINUOUS
Status: CANCELLED | OUTPATIENT
Start: 2022-06-24

## 2022-06-24 RX ORDER — ACETAMINOPHEN 325 MG/1
975 TABLET ORAL ONCE
Status: COMPLETED | OUTPATIENT
Start: 2022-06-24 | End: 2022-06-24

## 2022-06-24 RX ORDER — ONDANSETRON 4 MG/1
4 TABLET, ORALLY DISINTEGRATING ORAL EVERY 30 MIN PRN
Status: DISCONTINUED | OUTPATIENT
Start: 2022-06-24 | End: 2022-06-24 | Stop reason: HOSPADM

## 2022-06-24 RX ORDER — SODIUM CHLORIDE, SODIUM LACTATE, POTASSIUM CHLORIDE, CALCIUM CHLORIDE 600; 310; 30; 20 MG/100ML; MG/100ML; MG/100ML; MG/100ML
INJECTION, SOLUTION INTRAVENOUS CONTINUOUS PRN
Status: DISCONTINUED | OUTPATIENT
Start: 2022-06-24 | End: 2022-06-24

## 2022-06-24 RX ORDER — PROPOFOL 10 MG/ML
INJECTION, EMULSION INTRAVENOUS PRN
Status: DISCONTINUED | OUTPATIENT
Start: 2022-06-24 | End: 2022-06-24

## 2022-06-24 RX ORDER — SODIUM CHLORIDE, SODIUM LACTATE, POTASSIUM CHLORIDE, CALCIUM CHLORIDE 600; 310; 30; 20 MG/100ML; MG/100ML; MG/100ML; MG/100ML
INJECTION, SOLUTION INTRAVENOUS CONTINUOUS
Status: DISCONTINUED | OUTPATIENT
Start: 2022-06-24 | End: 2022-06-24 | Stop reason: HOSPADM

## 2022-06-24 RX ORDER — ONDANSETRON 2 MG/ML
INJECTION INTRAMUSCULAR; INTRAVENOUS PRN
Status: DISCONTINUED | OUTPATIENT
Start: 2022-06-24 | End: 2022-06-24

## 2022-06-24 RX ORDER — FENTANYL CITRATE 50 UG/ML
INJECTION, SOLUTION INTRAMUSCULAR; INTRAVENOUS PRN
Status: DISCONTINUED | OUTPATIENT
Start: 2022-06-24 | End: 2022-06-24

## 2022-06-24 RX ORDER — BUPIVACAINE HYDROCHLORIDE 2.5 MG/ML
INJECTION, SOLUTION EPIDURAL; INFILTRATION; INTRACAUDAL PRN
Status: DISCONTINUED | OUTPATIENT
Start: 2022-06-24 | End: 2022-06-24

## 2022-06-24 RX ORDER — FENTANYL CITRATE 50 UG/ML
25-50 INJECTION, SOLUTION INTRAMUSCULAR; INTRAVENOUS
Status: DISCONTINUED | OUTPATIENT
Start: 2022-06-24 | End: 2022-06-24 | Stop reason: HOSPADM

## 2022-06-24 RX ORDER — OXYCODONE HYDROCHLORIDE 5 MG/1
5 TABLET ORAL EVERY 4 HOURS PRN
Status: DISCONTINUED | OUTPATIENT
Start: 2022-06-24 | End: 2022-06-27 | Stop reason: HOSPADM

## 2022-06-24 RX ORDER — DEXAMETHASONE SODIUM PHOSPHATE 4 MG/ML
INJECTION, SOLUTION INTRA-ARTICULAR; INTRALESIONAL; INTRAMUSCULAR; INTRAVENOUS; SOFT TISSUE PRN
Status: DISCONTINUED | OUTPATIENT
Start: 2022-06-24 | End: 2022-06-24

## 2022-06-24 RX ORDER — LIDOCAINE 40 MG/G
CREAM TOPICAL
Status: DISCONTINUED | OUTPATIENT
Start: 2022-06-24 | End: 2022-06-24 | Stop reason: HOSPADM

## 2022-06-24 RX ORDER — SULFAMETHOXAZOLE AND TRIMETHOPRIM 400; 80 MG/1; MG/1
1 TABLET ORAL 2 TIMES DAILY
COMMUNITY
End: 2023-06-23

## 2022-06-24 RX ORDER — CLINDAMYCIN PHOSPHATE 900 MG/50ML
900 INJECTION, SOLUTION INTRAVENOUS
Status: COMPLETED | OUTPATIENT
Start: 2022-06-24 | End: 2022-06-24

## 2022-06-24 RX ORDER — AMOXICILLIN 250 MG
1-2 CAPSULE ORAL 2 TIMES DAILY
Qty: 30 TABLET | Refills: 0 | Status: SHIPPED | OUTPATIENT
Start: 2022-06-24 | End: 2022-11-04

## 2022-06-24 RX ORDER — FLUMAZENIL 0.1 MG/ML
0.2 INJECTION, SOLUTION INTRAVENOUS
Status: DISCONTINUED | OUTPATIENT
Start: 2022-06-24 | End: 2022-06-24 | Stop reason: HOSPADM

## 2022-06-24 RX ORDER — BUPIVACAINE HYDROCHLORIDE 5 MG/ML
INJECTION, SOLUTION EPIDURAL; INTRACAUDAL PRN
Status: DISCONTINUED | OUTPATIENT
Start: 2022-06-24 | End: 2022-06-24 | Stop reason: HOSPADM

## 2022-06-24 RX ORDER — NALOXONE HYDROCHLORIDE 0.4 MG/ML
0.2 INJECTION, SOLUTION INTRAMUSCULAR; INTRAVENOUS; SUBCUTANEOUS
Status: DISCONTINUED | OUTPATIENT
Start: 2022-06-24 | End: 2022-06-27 | Stop reason: HOSPADM

## 2022-06-24 RX ORDER — LABETALOL HYDROCHLORIDE 5 MG/ML
10 INJECTION, SOLUTION INTRAVENOUS
Status: DISCONTINUED | OUTPATIENT
Start: 2022-06-24 | End: 2022-06-24 | Stop reason: HOSPADM

## 2022-06-24 RX ORDER — HYDROMORPHONE HYDROCHLORIDE 1 MG/ML
0.4 INJECTION, SOLUTION INTRAMUSCULAR; INTRAVENOUS; SUBCUTANEOUS EVERY 5 MIN PRN
Status: DISCONTINUED | OUTPATIENT
Start: 2022-06-24 | End: 2022-06-24 | Stop reason: HOSPADM

## 2022-06-24 RX ADMIN — HYDRALAZINE HYDROCHLORIDE 5 MG: 20 INJECTION INTRAMUSCULAR; INTRAVENOUS at 13:29

## 2022-06-24 RX ADMIN — FENTANYL CITRATE 50 MCG: 50 INJECTION, SOLUTION INTRAMUSCULAR; INTRAVENOUS at 10:52

## 2022-06-24 RX ADMIN — MIDAZOLAM 1 MG: 1 INJECTION INTRAMUSCULAR; INTRAVENOUS at 10:52

## 2022-06-24 RX ADMIN — ONDANSETRON 4 MG: 2 INJECTION INTRAMUSCULAR; INTRAVENOUS at 12:46

## 2022-06-24 RX ADMIN — ACETAMINOPHEN 975 MG: 325 TABLET, FILM COATED ORAL at 10:06

## 2022-06-24 RX ADMIN — FENTANYL CITRATE 100 MCG: 50 INJECTION, SOLUTION INTRAMUSCULAR; INTRAVENOUS at 11:40

## 2022-06-24 RX ADMIN — Medication 50 MG: at 11:43

## 2022-06-24 RX ADMIN — DEXAMETHASONE SODIUM PHOSPHATE 4 MG: 4 INJECTION, SOLUTION INTRA-ARTICULAR; INTRALESIONAL; INTRAMUSCULAR; INTRAVENOUS; SOFT TISSUE at 11:51

## 2022-06-24 RX ADMIN — SUGAMMADEX 200 MG: 100 INJECTION, SOLUTION INTRAVENOUS at 13:03

## 2022-06-24 RX ADMIN — BUPIVACAINE HYDROCHLORIDE 20 ML: 2.5 INJECTION, SOLUTION EPIDURAL; INFILTRATION; INTRACAUDAL; PERINEURAL at 11:05

## 2022-06-24 RX ADMIN — HYDROMORPHONE HYDROCHLORIDE 0.5 MG: 1 INJECTION, SOLUTION INTRAMUSCULAR; INTRAVENOUS; SUBCUTANEOUS at 12:07

## 2022-06-24 RX ADMIN — CLINDAMYCIN PHOSPHATE 900 MG: 900 INJECTION, SOLUTION INTRAVENOUS at 11:30

## 2022-06-24 RX ADMIN — SODIUM CHLORIDE, POTASSIUM CHLORIDE, SODIUM LACTATE AND CALCIUM CHLORIDE: 600; 310; 30; 20 INJECTION, SOLUTION INTRAVENOUS at 10:43

## 2022-06-24 RX ADMIN — Medication 10 MG: at 12:26

## 2022-06-24 RX ADMIN — BUPIVACAINE 20 ML: 13.3 INJECTION, SUSPENSION, LIPOSOMAL INFILTRATION at 11:05

## 2022-06-24 RX ADMIN — PROPOFOL 150 MG: 10 INJECTION, EMULSION INTRAVENOUS at 11:41

## 2022-06-24 RX ADMIN — SODIUM CHLORIDE, POTASSIUM CHLORIDE, SODIUM LACTATE AND CALCIUM CHLORIDE: 600; 310; 30; 20 INJECTION, SOLUTION INTRAVENOUS at 11:30

## 2022-06-24 NOTE — OP NOTE
Procedure Date: 06/24/2022    PREOPERATIVE DIAGNOSIS:  Incisional ventral hernia.    POSTOPERATIVE DIAGNOSIS:  Incisional ventral hernia.    OPERATIVE PROCEDURE:  Open mesh repair of incisional ventral hernia.    SURGEON:  Shaheed Curtis MD    FIRST ASSISTANT:  Shanna Hook MD, surgery resident.     ANESTHESIA:  General endotracheal.    INDICATIONS FOR PROCEDURE:  The patient presents with a significant incisional ventral hernia at the inferior aspect of his previous surgical scar.  Informed consent was obtained.    OPERATIVE FINDINGS:  Approximately 8 x 3 cm incisional ventral hernia.  A vertical at the inferior aspect of abdomen.  Refer to below.  Mesh underlay of Parietex ProGrip mesh again, refer to below.    DESCRIPTION OF PROCEDURE:  The patient was brought to the operating room, put under general anesthesia.  Abdomen widely prepped and draped in sterile fashion.  A skin incision was made along the previous surgical scar at the inferior aspect of the abdomen and came down to pubis.  Dissection was carried down to obvious fascial defect that had multiple Prolene sutures that were all removed.  The hernia sac was then isolated away from the surrounding soft tissue and carried down into the preperitoneal space.  There were several defects that were created during dissection.  These were closed under direct vision without difficulty using 2-0 Vicryl.  Once the preperitoneal space was developed.  A 15 x 15 x 8 cm Parietex ProGrip mesh was placed as an underlay and secured out in all quadrants using interrupted 2-0 PDS.  This covered as an underlay completely and the fascial defect was then closed with a running 0 PDS in 2 layers.  It was further bolstered with a 2 x 10 cm Parietex ProGrip mesh placed as a suture.  Bolster along that suture line.  I then closed the soft tissue in 2 layers using a running 2-0 PDS.  The skin closed with subcuticular, Steri-Strips were applied.  Estimated blood loss was 5 cc.   Sponge, needle count correct twice.  The patient tolerated the procedure well and was taken to recovery room where he was without difficulty or apparent complication.    Shaheed Curtis MD        D: 2022   T: 2022   MT: [id number]    Name:     SANCHEZ MCNEIL  MRN:      [id number]        Account:        [id number]   :      1955           Procedure Date: 2022     Document: [id number]

## 2022-06-24 NOTE — DISCHARGE INSTRUCTIONS
"[facility] [place]  Same-Day Surgery   Adult Discharge Orders & Instructions       *Take it easy when you get home.  Remember, same day surgery DOES NOT MEAN SAME DAY RECOVERY!    *Healing is a gradual process and you will need some time to recover - you may be more tired than you realize at first.    *Rest and relax for at least the first 24 hours at home.  You'll feel better and heal faster if you take good care of yourself.    For 24 hours after surgery    A responsible adult must stay with you for at least 24 hours after you leave the hospital.   Do not drink alcohol, drive, or use heavy equipment for 24 hours. This is because you have had anesthesia medications.  Avoid strenuous and risky activities for 24 hours.   You may feel lightheaded, if so, sit for a few minutes before standing.  You may need someone to help you get up. Ask for help when climbing stairs.  If you have nausea: Drink only clear liquids such as water, juice, soda, or broth. Rest may also help. Be sure to drink enough fluids. Move to a  regular diet as you feel able.  You may have a slight fever. Call the doctor if your fever is over 100 F (37.7 C) (taken under the tongue) or lasts longer than 24 hours.  You might have a dry mouth, sore throat, muscle aches or trouble sleeping.  These should go away after 24 hours.  Do not make important or legal decisions.     Call your doctor for any of the followin.  Signs of infection: fever, growing tenderness at the surgery site, a large amount of drainage or bleeding, severe pain, foul-smelling drainage, redness, swelling. Please call if you experience any of these symptoms.    2. If it has been 8 to 10 hours since surgery and you are still not able to urinate (pass water), please call.    3.  If you have a headache for over 24 hours, please call.    To contact a doctor, call Dr. Curtis's clinic @ [phone]  or:    ' [phone] and ask for \"the resident on " "call for surgical service \" (this is the hospital and is answered 24 hours a day)    '   Emergency Department: [facility]: [phone]       (TTY for hearing impaired: [phone])   "

## 2022-06-24 NOTE — OR NURSING
Bilateral TAP blocks performed by Tres Vázquez and Jenna. Pt tolerated well. Patient denies any adverse signs or symptoms. See flow sheet/MAR for monitoring.

## 2022-06-24 NOTE — ANESTHESIA POSTPROCEDURE EVALUATION
Patient: Murray Nicholson    Procedure: Procedure(s):  open mesh repair, incisional ventral hernia       Anesthesia Type:  General    Note:  Disposition: Outpatient   Postop Pain Control: Uneventful            Sign Out: Well controlled pain   PONV: No   Neuro/Psych: Uneventful            Sign Out: Acceptable/Baseline neuro status   Airway/Respiratory: Uneventful            Sign Out: Acceptable/Baseline resp. status   CV/Hemodynamics: Uneventful            Sign Out: Acceptable CV status; No obvious hypovolemia; No obvious fluid overload   Other NRE: NONE   DID A NON-ROUTINE EVENT OCCUR? No           Last vitals:  Vitals Value Taken Time   /97 06/24/22 1445   Temp 36  C (96.8  F) 06/24/22 1415   Pulse 74 06/24/22 1446   Resp 18 06/24/22 1430   SpO2 100 % 06/24/22 1446   Vitals shown include unvalidated device data.    Electronically Signed By: Dean Ma MD  June 24, 2022  2:47 PM

## 2022-06-24 NOTE — ANESTHESIA PROCEDURE NOTES
Airway       Patient location during procedure: OR       Procedure Start/Stop Times: 6/24/2022 11:45 AM  Staff -        CRNA: Yaima Paulino APRN CRNA       Performed By: CRNA  Consent for Airway        Urgency: elective  Indications and Patient Condition       Indications for airway management: daniella-procedural       Induction type:intravenous       Mask difficulty assessment: 2 - vent by mask + OA or adjuvant +/- NMBA    Final Airway Details       Final airway type: endotracheal airway       Successful airway: ETT - single and Oral  Endotracheal Airway Details        ETT size (mm): 8.0       Cuffed: yes       Cuff volume (mL): 10       Successful intubation technique: direct laryngoscopy       DL Blade Type: Vasquez 2       Grade View of Cords: 1       Adjucts: stylet       Position: Right       Measured from: lips       Secured at (cm): 24       Bite block used: None    Post intubation assessment        Placement verified by: capnometry, equal breath sounds and chest rise        Number of attempts at approach: 1       Secured with: silk tape       Ease of procedure: easy       Dentition: Intact and Unchanged    Medication(s) Administered   Medication Administration Time: 6/24/2022 11:45 AM

## 2022-06-24 NOTE — OR NURSING
"Called pt's friend \"Britany\" to verify her pick-up. Patient stated I could leave a message since the friend did not answer. I informed in message about pt's timeframe for surgery/recovery/ and pick-up, and informed that we would be calling to review instructions with the friend over the phone. Left number for 3 C for friend to call for information, if desired.  "

## 2022-06-24 NOTE — OR NURSING
Called pt's friend Britany again--left message--attempting to confirm that she is picking pt up and staying overnite with the patient, and informed tentative time line for readiness for discharge.

## 2022-06-24 NOTE — BRIEF OP NOTE
"[facility]    Brief Operative Note    Pre-operative diagnosis: Ventral hernia [K43.9]  Post-operative diagnosis Same as pre-operative diagnosis    Procedure: Procedure(s):  open mesh repair, incisional ventral hernia  Surgeon: Surgeon(s) and Role:     * Shaheed Curtis MD - Primary  Anesthesia: General with Block   Estimated Blood Loss: 5 mL from 6/24/2022 11:28 AM to 6/24/2022  1:19 PM      Drains: None  Specimens: * No specimens in log *  Findings:   lower midline vental hernia at site of prior laparotomy. Hernia sac reduced. Hernia repaired with preperitoneal underlay mesh as well as overlay mesh. .  Complications: None.  Implants:   Implant Name Type Inv. Item Serial No.  Lot No. LRB No. Used Action   MESH POLYESTER PROGRIP 6X6\" ([id number]) [employer] [id number] - [id number] Mesh MESH POLYESTER PROGRIP 6X6\" ([id number]) [employer] [id number]  [facility] [id number] N/A 1 Implanted         Pushpa Hook MD  General Surgery PGY2    "

## 2022-06-24 NOTE — ANESTHESIA PROCEDURE NOTES
TAP Procedure Note    Pre-Procedure   Staff -        Anesthesiologist:  Ryley West MD       Resident/Fellow: Gabby Vázquez MD       Performed By: resident       Location: pre-op       Pre-Anesthestic Checklist: patient identified, IV checked, site marked, risks and benefits discussed, informed consent, monitors and equipment checked, pre-op evaluation, at physician/surgeon's request and post-op pain management  Timeout:       Correct Patient: Yes        Correct Procedure: Yes        Correct Site: Yes        Correct Position: Yes        Correct Laterality: Yes        Site Marked: Yes  Procedure Documentation  Procedure: TAP       Diagnosis: POST OPERATIVE PAIN       Laterality: bilateral       Patient Position: supine       Patient Prep/Sterile Barriers: sterile gloves, mask       Skin prep: Chloraprep       Needle Type: short bevel       Needle Gauge: 21.        Needle Length (millimeters): 110        Ultrasound guided       1. Ultrasound was used to identify targeted nerve, plexus, vascular marker, or fascial plane and place a needle adjacent to it in real-time.       2. Ultrasound was used to visualize the spread of anesthetic in close proximity to the above referenced structure.       3. A permanent image is entered into the patient's record.    Assessment/Narrative         The placement was negative for: blood aspirated, painful injection and site bleeding       Paresthesias: No.       Bolus given via needle..        Secured via.        Insertion/Infusion Method: Single Shot       Complications: none       Injection made incrementally with aspirations every 5 mL.

## 2022-06-24 NOTE — ANESTHESIA CARE TRANSFER NOTE
Patient: Murray Nicholson    Procedure: Procedure(s):  open mesh repair, incisional ventral hernia       Diagnosis: Ventral hernia [K43.9]  Diagnosis Additional Information: No value filed.    Anesthesia Type:   General     Note:    Oropharynx: oropharynx clear of all foreign objects and spontaneously breathing  Level of Consciousness: drowsy  Oxygen Supplementation: nasal cannula  Level of Supplemental Oxygen (L/min / FiO2): 2  Independent Airway: airway patency satisfactory and stable  Dentition: dentition unchanged  Vital Signs Stable: post-procedure vital signs reviewed and stable  Report to RN Given: handoff report given  Patient transferred to: PACU    Handoff Report: Identifed the Patient, Identified the Reponsible Provider, Reviewed the pertinent medical history, Discussed the surgical course, Reviewed Intra-OP anesthesia mangement and issues during anesthesia, Set expectations for post-procedure period and Allowed opportunity for questions and acknowledgement of understanding      Vitals:  Vitals Value Taken Time   /103 06/24/22 1323   Temp 35.6    Pulse 71 06/24/22 1326   Resp 16    SpO2 100 % 06/24/22 1326   Vitals shown include unvalidated device data.    Electronically Signed By: VERONICA Mukherjee CRNA  June 24, 2022  1:27 PM

## 2022-06-27 ENCOUNTER — PATIENT OUTREACH (OUTPATIENT)
Dept: SURGERY | Facility: CLINIC | Age: 67
End: 2022-06-27

## 2022-06-27 NOTE — PROGRESS NOTES
RN Post-Op/Post-Discharge Care Coordination Note    Mr. Murray Nicholson is a 67 year old male who underwent open ventral/umbilical hernia repair on 6/24 with  Dr. Shaheed Curtis.  Spoke with Patient.    Support  Patient able to care for self independently     Health Status  Nausea/Vomiting: Patient denies nausea/vomiting.  Eating/drinking: Patient is able to eat and drink without any complaints.  Bowel habits: Patient reports constipation withlast BM 3 days ago, passing flatus. Taking Senna 1 tab BID and recommended increasing to 2 tabs BID. May take an OTC laxative if needed per package instructions  Drains (PAULINA): N/A  Fevers/chills: Patient denies any fever or chills.  Incisions: Patient denies any signs and symptoms of infection..  Wound closure:  Steri-strips  Pain: Improving. He is taking Ultram per Rx instructions.  New Medications:  Ultram    Activity/Restrictions  No lifting in excess of 15-20 pounds for 3-4 weeks    Equipment  None    Pathology reviewed with patient:  N/A    Forms/Letters  Yes- completed and returned to [employer]. Off of work until 7/22. RTW 7/23 with no restrictions.    All of his questions were answered including reviewing restrictions and wound care.  He will call this office if he has any further questions and/or concerns.      Post op appointment arranged 7/7 with Dr. Curtis at 1300.    Whom and When to Call  Patient acknowledges understanding of how to manage any medication changes and   when to seek medical care.     Patient advised that if after hour medical concerns arise to please call [phone] and choose option 4 to speak to the physician on call.

## 2022-07-08 ENCOUNTER — PATIENT OUTREACH (OUTPATIENT)
Dept: SURGERY | Facility: CLINIC | Age: 67
End: 2022-07-08

## 2022-07-08 NOTE — PROGRESS NOTES
Patient Telephone Reminder Call    Date of call:  07/08/22  Phone numbers:  Cell number on file:    Telephone Information:   Mobile [phone]       Reached patient/confirmed appointment:  Yes  Appointment with:   Dr. Shaheed Curtis  Reason for visit:  PO

## 2022-07-11 ENCOUNTER — LAB (OUTPATIENT)
Dept: LAB | Facility: CLINIC | Age: 67
End: 2022-07-11

## 2022-07-11 ENCOUNTER — OFFICE VISIT (OUTPATIENT)
Dept: SURGERY | Facility: CLINIC | Age: 67
End: 2022-07-11
Payer: MEDICARE

## 2022-07-11 VITALS
DIASTOLIC BLOOD PRESSURE: 76 MMHG | BODY MASS INDEX: 26.25 KG/M2 | HEIGHT: 69 IN | HEART RATE: 98 BPM | SYSTOLIC BLOOD PRESSURE: 124 MMHG | WEIGHT: 177.2 LBS | OXYGEN SATURATION: 99 %

## 2022-07-11 DIAGNOSIS — Z98.890 POSTOPERATIVE STATE: Primary | ICD-10-CM

## 2022-07-11 DIAGNOSIS — Z94.0 KIDNEY REPLACED BY TRANSPLANT: ICD-10-CM

## 2022-07-11 LAB
ANION GAP SERPL CALCULATED.3IONS-SCNC: 9 MMOL/L (ref 3–14)
BUN SERPL-MCNC: 14 MG/DL (ref 7–30)
CALCIUM SERPL-MCNC: 9.7 MG/DL (ref 8.5–10.1)
CHLORIDE BLD-SCNC: 105 MMOL/L (ref 94–109)
CO2 SERPL-SCNC: 28 MMOL/L (ref 20–32)
CREAT SERPL-MCNC: 0.95 MG/DL (ref 0.66–1.25)
ERYTHROCYTE [DISTWIDTH] IN BLOOD BY AUTOMATED COUNT: 12.6 % (ref 10–15)
GFR SERPL CREATININE-BSD FRML MDRD: 88 ML/MIN/1.73M2
GLUCOSE BLD-MCNC: 147 MG/DL (ref 70–99)
HCT VFR BLD AUTO: 38.4 % (ref 40–53)
HGB BLD-MCNC: 12.6 G/DL (ref 13.3–17.7)
MCH RBC QN AUTO: 29.4 PG (ref 26.5–33)
MCHC RBC AUTO-ENTMCNC: 32.8 G/DL (ref 31.5–36.5)
MCV RBC AUTO: 90 FL (ref 78–100)
PLATELET # BLD AUTO: 249 10E3/UL (ref 150–450)
POTASSIUM BLD-SCNC: 4.1 MMOL/L (ref 3.4–5.3)
RBC # BLD AUTO: 4.28 10E6/UL (ref 4.4–5.9)
SODIUM SERPL-SCNC: 142 MMOL/L (ref 133–144)
TACROLIMUS BLD-MCNC: 5.6 UG/L (ref 5–15)
TME LAST DOSE: NORMAL H
TME LAST DOSE: NORMAL H
WBC # BLD AUTO: 4.4 10E3/UL (ref 4–11)

## 2022-07-11 PROCEDURE — 99024 POSTOP FOLLOW-UP VISIT: CPT | Performed by: SURGERY

## 2022-07-11 PROCEDURE — 80197 ASSAY OF TACROLIMUS: CPT | Performed by: INTERNAL MEDICINE

## 2022-07-11 PROCEDURE — 36415 COLL VENOUS BLD VENIPUNCTURE: CPT | Performed by: PATHOLOGY

## 2022-07-11 PROCEDURE — 80048 BASIC METABOLIC PNL TOTAL CA: CPT | Performed by: PATHOLOGY

## 2022-07-11 PROCEDURE — 85027 COMPLETE CBC AUTOMATED: CPT | Performed by: PATHOLOGY

## 2022-07-11 ASSESSMENT — PAIN SCALES - GENERAL: PAINLEVEL: MILD PAIN (3)

## 2022-07-11 NOTE — PROGRESS NOTES
Murray Nicholson is status post:  6/24/2022  Herniorrhaphy ventral (N/A) Procedure: open mesh repair, incisional ventral hernia;  Surgeon: Shaheed Curtis MD;  Location: [facility] OR  Surgery without complications.  Post-op course without complications.  Incisions examined, no signs of infection.  All of the patients questions were answered.  Standard post-op instructions and restrictions given.  Follow-up as needed

## 2022-07-11 NOTE — LETTER
7/11/2022      RE: Murray Nicholson  [address]  [place] [address]       Murray Arteagara is status post:  6/24/2022  Herniorrhaphy ventral (N/A) Procedure: open mesh repair, incisional ventral hernia;  Surgeon: Shaheed Curtis MD;  Location: [facility] OR  Surgery without complications.  Post-op course without complications.  Incisions examined, no signs of infection.  All of the patients questions were answered.  Standard post-op instructions and restrictions given.  Follow-up as needed        Shaheed Curtis MD

## 2022-07-11 NOTE — LETTER
7/11/2022       RE: Murray Nicholson  [address]  [place] [address]     Dear Colleague,    Thank you for referring your patient, Murray Nicholson, to the [facility] GENERAL SURGERY CLINIC [place] at [facility]. Please see a copy of my visit note below.    Murray Nicholson is status post:  6/24/2022  Herniorrhaphy ventral (N/A) Procedure: open mesh repair, incisional ventral hernia;  Surgeon: Shaheed Curtis MD;  Location: [facility] OR  Surgery without complications.  Post-op course without complications.  Incisions examined, no signs of infection.  All of the patients questions were answered.  Standard post-op instructions and restrictions given.  Follow-up as needed        Again, thank you for allowing me to participate in the care of your patient.      Sincerely,    Shaheed Curtis MD

## 2022-07-11 NOTE — PATIENT INSTRUCTIONS
You met with Dr. Shaheed Curtis.      Today's visit instructions:    Return to the Surgery Clinic on an as needed basis.        If you have questions please contact Kristi RN or Melinda RN during regular clinic hours, Monday through Friday 7:30 AM - 4:00 PM, or you can contact us via [employer] at anytime.       If you have urgent needs after-hours, weekends, or holidays please call the hospital at [phone] and ask to speak with our on-call General Surgery Team.    Appointment [phone]  Nurse Advice (Kristi or Melinda): [phone]   Surgery Scheduler (Tigre): [phone]  Fax: [phone]

## 2022-07-11 NOTE — NURSING NOTE
"Chief Complaint   Patient presents with     Post-Op - General Surgery     Post-op       Vitals:    07/11/22 1232   BP: 124/76   BP Location: Right arm   Patient Position: Sitting   Cuff Size: Adult Regular   Pulse: 98   SpO2: 99%   Weight: 80.4 kg (177 lb 3.2 oz)   Height: 1.746 m (5' 8.75\")       Body mass index is 26.36 kg/m .                          Sanjeev Pat, EMT    "

## 2022-07-13 ENCOUNTER — OFFICE VISIT (OUTPATIENT)
Dept: NEUROLOGY | Facility: CLINIC | Age: 67
End: 2022-07-13
Attending: OTOLARYNGOLOGY
Payer: MEDICARE

## 2022-07-13 ENCOUNTER — ANCILLARY PROCEDURE (OUTPATIENT)
Dept: CT IMAGING | Facility: CLINIC | Age: 67
End: 2022-07-13
Attending: PSYCHIATRY & NEUROLOGY
Payer: MEDICARE

## 2022-07-13 VITALS
TEMPERATURE: 97.8 F | BODY MASS INDEX: 26.48 KG/M2 | SYSTOLIC BLOOD PRESSURE: 115 MMHG | OXYGEN SATURATION: 100 % | RESPIRATION RATE: 16 BRPM | DIASTOLIC BLOOD PRESSURE: 78 MMHG | HEART RATE: 88 BPM | WEIGHT: 178 LBS

## 2022-07-13 DIAGNOSIS — I63.9 CEREBROVASCULAR ACCIDENT (CVA), UNSPECIFIED MECHANISM (H): ICD-10-CM

## 2022-07-13 DIAGNOSIS — I63.9 CEREBROVASCULAR ACCIDENT (CVA), UNSPECIFIED MECHANISM (H): Primary | ICD-10-CM

## 2022-07-13 PROCEDURE — 99205 OFFICE O/P NEW HI 60 MIN: CPT | Performed by: PSYCHIATRY & NEUROLOGY

## 2022-07-13 PROCEDURE — 70498 CT ANGIOGRAPHY NECK: CPT | Mod: MG | Performed by: RADIOLOGY

## 2022-07-13 PROCEDURE — G1010 CDSM STANSON: HCPCS | Mod: GC | Performed by: RADIOLOGY

## 2022-07-13 PROCEDURE — 70496 CT ANGIOGRAPHY HEAD: CPT | Mod: MG | Performed by: RADIOLOGY

## 2022-07-13 RX ORDER — IOPAMIDOL 755 MG/ML
75 INJECTION, SOLUTION INTRAVASCULAR ONCE
Status: COMPLETED | OUTPATIENT
Start: 2022-07-13 | End: 2022-07-13

## 2022-07-13 RX ADMIN — IOPAMIDOL 75 ML: 755 INJECTION, SOLUTION INTRAVASCULAR at 13:05

## 2022-07-13 NOTE — PROGRESS NOTES
[facility] Neurology Consultation    Murray Nicholson MRN# [id number]   Age: 67 year old YOB: 1955     Requesting physician: Alicia Morrison     Reason for Consultation: abnormal MRI      History of Presenting Symptoms:   Murray Nicholson is a 67 year old male who presents today for evaluation of abnormal MRI. The patient has a pertinent medical history for heart transplantation 6/14/2018 and kidney transplantation 12/2019.  He also has a history of DMII, HTN, MGUS, HLD, and a recent ventral hernia repair (6/2022).    The patient was seen with Otolaryngologist, Dr. Godwin, 12/23/2021, for ten years of right hearing loss (occurred suddenly).  MRI brain was done 11/12/2021, showing focal encephalomalacia of right frontal and left occipital lobes, which were likely chronic infarctions.  There was also T2 hyperintensity consistent with small vessel disease.    Today, the patient indicates that 12 years ago he did an audition for the [employer], and felt a shock when someone did a stage whisper into his right ear.  He awoke the next morning, and noted he had vertigo.  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  He has since required phonic cross hearing aides (takes sound from one side, transfers it to the other ear).  Otherwise, he did have intense hyperventilation episodes (severly out of breath with minimal effort) prior to his heart transplant, which eventually led to the transplant itself through w/up with cardiology.  The only symptom he noted after his heart transplant was for some difficulty closing his hands, or slowed motion of closing his hands.  This didn't necessarily occur after his transplant immediately, but was more notable.  He mentions that he couldn't close a jar when getting home from surgery, but this has improved with PT and strength training over time.  He doesn't feel that his hands are weak,  but that he just can't close (not painful, not tight, not restricted from joint inflammation).  He has never had any vision change beyond his need for glasses, and doesn't describe a vision loss in the right visual field.      Social History:   Former smoker, quit 1994. Occasional alcohol use reported     Medications:   Amlodipine  ASA  Atorvastatin  Metformin  Tacrolimus     Physical Exam:   Vitals: /78   Pulse 88   Temp 97.8  F (36.6  C)   Resp 16   Wt 80.7 kg (178 lb)   SpO2 100%   BMI 26.48 kg/m     General: Seated comfortably in no acute distress. Very light-hearted individual, affable, accurate historian.  HEENT:  Optic discs sharp and vasculature normal on funduscopic exam.   Skin: No rashes  Neurologic:     Mental Status: Fully alert, attentive and oriented. Speech clear and fluent, no paraphasic errors. MiniCog score of 5/5. Cube copy correct. Serial 7's to 5 subtractions without error.  Luria testing b/l without issue. Mimes hand motions easily, can pretend to use a toothbrush without issue with both hands.     Cranial Nerves: Visual fields intact to threat in whole fields. PERRL. EOMI with normal smooth pursuit. Facial sensation intact/symmetric. Facial movements symmetric. Hearing not formally tested but intact to conversation. Palate elevation symmetric, uvula midline. No dysarthria. Shoulder shrug strong bilaterally. Tongue protrusion midline.     Motor: No tremors or other abnormal movements observed. Muscle tone normal throughout. No pronator drift. Normal/symmetric rapid finger tapping. Strength 5/5 throughout upper and lower extremities.     Deep Tendon Reflexes: 2+/symmetric throughout upper and lower extremities. No clonus. Toes downgoing bilaterally.     Sensory: Intact/symmetric to light touch, pinprick, temperature, vibration and proprioception throughout upper and lower extremities. Negative Romberg.      Coordination: Finger-nose-finger and heel-shin intact without dysmetria.  Rapid alternating movements intact/symmetric with normal speed and rhythm.     Gait: Normal, steady casual gait. Able to walk on toes, heels and tandem without difficulty.         Data: Pertinent prior to visit   Imaging:  Reviewed today (as above)    Procedures:  Echocardiogram: 6/13/2022 ~ EF 60-65%, LV size/thickness normal. R-atrial normal, mild LA enlargement.    Laboratory:  6/13/2022 - LDL was 35. 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 I suspect that given the non-linked vascular distributions that these infarctions were cardio-embolic in nature, but to further rule out unlikely possibilities such as b/l thromboembolic issues of narrowed/occluded blood vessels I would want him to have a CTA head and neck.  A similar thought to A-fib would require further investigation with a holter monitor.  Otherwise, he is doing well with use of stroke prophylaxis already (as below).  I did indicate to the patient that his primary auditory cortex is located on the superior temporal gyrus in the temporal lobe, which is not necessarily injured or noted to have encephalomalacia on imaging review today.  I do not think his hearing issues relate to his strokes.     Plan:  - CTA head and neck  - Holter for 30 days  - Continue ASA daily, continue statin therapy for LDL goals under 70, A1c goals under 6.0, Blood pressure goals under 140/90    Follow up in Neurology clinic should new concerns arise.    HANY De La Paz D.O.   of Neurology    Total time today (61 min) in this patient encounter was spent on pre-charting, counseling and/or coordination of care. We reviewed diagnostic results, and  impressions. We discussed the implications of the diagnosis, as well as risks and benefits of management options.  The patient is in agreement with this plan and has no further questions.

## 2022-07-13 NOTE — PATIENT INSTRUCTIONS
I think your two strokes likely occurred during or near your heart transplant and are likely asymptomatic.  I would like you to obtain a few tests to further rule out other causes of stroke beyond post-operative or operative etiologies.    - CTA head and neck w/wout contrast  - Holter monitor 14-30 days    Continue with the following:  - Continue ASA daily  - continue statin therapy for LDL goals under 70  - A1c goals under 6.0  - Blood pressure goals under 140/90

## 2022-07-13 NOTE — LETTER
7/13/2022       RE: Murray Nicholson  [address]  [place] [address]     Dear Colleague,    Thank you for referring your patient, Murray Nicholson, to the [facility] NEUROLOGY CLINIC [place] at [facility]. Please see a copy of my visit note below.    [facility] Neurology Consultation    Murray Nicholson MRN# [id number]   Age: 67 year old YOB: 1955     Requesting physician: Alicia Morrison     Reason for Consultation: abnormal MRI      History of Presenting Symptoms:   Murray Nicholson is a 67 year old male who presents today for evaluation of abnormal MRI. The patient has a pertinent medical history for heart transplantation 6/14/2018 and kidney transplantation 12/2019.  He also has a history of DMII, HTN, MGUS, HLD, and a recent ventral hernia repair (6/2022).    The patient was seen with Otolaryngologist, Dr. Godwin, 12/23/2021, for ten years of right hearing loss (occurred suddenly).  MRI brain was done 11/12/2021, showing focal encephalomalacia of right frontal and left occipital lobes, which were likely chronic infarctions.  There was also T2 hyperintensity consistent with small vessel disease.    Today, the patient indicates that 12 years ago he did an audition for the [employer], and felt a shock when someone did a stage whisper into his right ear.  He awoke the next morning, and noted he had vertigo.  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  He has since required phonic cross hearing aides (takes sound from one side, transfers it to the other ear).  Otherwise, he did have intense hyperventilation episodes (severly out of breath with minimal effort) prior to his heart transplant, which eventually led to the transplant itself through w/up with cardiology.  The only symptom he noted after his heart transplant was for some difficulty  closing his hands, or slowed motion of closing his hands.  This didn't necessarily occur after his transplant immediately, but was more notable.  He mentions that he couldn't close a jar when getting home from surgery, but this has improved with PT and strength training over time.  He doesn't feel that his hands are weak, but that he just can't close (not painful, not tight, not restricted from joint inflammation).  He has never had any vision change beyond his need for glasses, and doesn't describe a vision loss in the right visual field.      Social History:   Former smoker, quit 1994. Occasional alcohol use reported     Medications:   Amlodipine  ASA  Atorvastatin  Metformin  Tacrolimus     Physical Exam:   Vitals: /78   Pulse 88   Temp 97.8  F (36.6  C)   Resp 16   Wt 80.7 kg (178 lb)   SpO2 100%   BMI 26.48 kg/m     General: Seated comfortably in no acute distress. Very light-hearted individual, affable, accurate historian.  HEENT:  Optic discs sharp and vasculature normal on funduscopic exam.   Skin: No rashes  Neurologic:     Mental Status: Fully alert, attentive and oriented. Speech clear and fluent, no paraphasic errors. MiniCog score of 5/5. Cube copy correct. Serial 7's to 5 subtractions without error.  Luria testing b/l without issue. Mimes hand motions easily, can pretend to use a toothbrush without issue with both hands.     Cranial Nerves: Visual fields intact to threat in whole fields. PERRL. EOMI with normal smooth pursuit. Facial sensation intact/symmetric. Facial movements symmetric. Hearing not formally tested but intact to conversation. Palate elevation symmetric, uvula midline. No dysarthria. Shoulder shrug strong bilaterally. Tongue protrusion midline.     Motor: No tremors or other abnormal movements observed. Muscle tone normal throughout. No pronator drift. Normal/symmetric rapid finger tapping. Strength 5/5 throughout upper and lower extremities.     Deep Tendon Reflexes:  2+/symmetric throughout upper and lower extremities. No clonus. Toes downgoing bilaterally.     Sensory: Intact/symmetric to light touch, pinprick, temperature, vibration and proprioception throughout upper and lower extremities. Negative Romberg.      Coordination: Finger-nose-finger and heel-shin intact without dysmetria. Rapid alternating movements intact/symmetric with normal speed and rhythm.     Gait: Normal, steady casual gait. Able to walk on toes, heels and tandem without difficulty.         Data: Pertinent prior to visit   Imaging:  Reviewed today (as above)    Procedures:  Echocardiogram: 6/13/2022 ~ EF 60-65%, LV size/thickness normal. R-atrial normal, mild LA enlargement.    Laboratory:  6/13/2022 - LDL was 35. 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 I suspect that given the non-linked vascular distributions that these infarctions were cardio-embolic in nature, but to further rule out unlikely possibilities such as b/l thromboembolic issues of narrowed/occluded blood vessels I would want him to have a CTA head and neck.  A similar thought to A-fib would require further investigation with a holter monitor.  Otherwise, he is doing well with use of stroke prophylaxis already (as below).  I did indicate to the patient that his primary auditory cortex is located on the superior temporal gyrus in the temporal lobe, which is not necessarily injured or noted to have encephalomalacia on imaging review today.  I do not think his hearing issues relate to his strokes.     Plan:  - CTA head and neck  - Holter for 30 days  - Continue ASA daily, continue statin therapy for LDL goals under 70, A1c goals  under 6.0, Blood pressure goals under 140/90    Follow up in Neurology clinic should new concerns arise.      HANY De La Paz D.O.   of Neurology      Total time today (61 min) in this patient encounter was spent on pre-charting, counseling and/or coordination of care. We reviewed diagnostic results, and impressions. We discussed the implications of the diagnosis, as well as risks and benefits of management options.  The patient is in agreement with this plan and has no further questions.

## 2022-07-26 DIAGNOSIS — Z94.0 KIDNEY TRANSPLANTED: ICD-10-CM

## 2022-07-26 DIAGNOSIS — E87.20 METABOLIC ACIDOSIS: ICD-10-CM

## 2022-07-26 DIAGNOSIS — Z94.0 KIDNEY REPLACED BY TRANSPLANT: ICD-10-CM

## 2022-07-26 RX ORDER — TACROLIMUS 1 MG/1
1 CAPSULE ORAL EVERY MORNING
Qty: 90 CAPSULE | Refills: 3 | Status: SHIPPED | OUTPATIENT
Start: 2022-07-26 | End: 2022-09-06

## 2022-07-26 RX ORDER — TACROLIMUS 0.5 MG/1
0.5 CAPSULE ORAL EVERY EVENING
Qty: 90 CAPSULE | Refills: 3 | Status: SHIPPED | OUTPATIENT
Start: 2022-07-26 | End: 2023-06-23

## 2022-07-28 ENCOUNTER — TRANSFERRED RECORDS (OUTPATIENT)
Dept: MULTI SPECIALTY CLINIC | Facility: CLINIC | Age: 67
End: 2022-07-28

## 2022-07-28 LAB — RETINOPATHY: NORMAL

## 2022-08-09 ENCOUNTER — ANCILLARY PROCEDURE (OUTPATIENT)
Dept: CARDIOLOGY | Facility: CLINIC | Age: 67
End: 2022-08-09
Attending: PSYCHIATRY & NEUROLOGY
Payer: MEDICARE

## 2022-08-09 ENCOUNTER — LAB (OUTPATIENT)
Dept: LAB | Facility: CLINIC | Age: 67
End: 2022-08-09
Payer: MEDICARE

## 2022-08-09 DIAGNOSIS — I63.9 CEREBROVASCULAR ACCIDENT (CVA), UNSPECIFIED MECHANISM (H): ICD-10-CM

## 2022-08-09 DIAGNOSIS — N18.4 TYPE 2 DIABETES MELLITUS WITH STAGE 4 CHRONIC KIDNEY DISEASE, WITHOUT LONG-TERM CURRENT USE OF INSULIN (H): Primary | ICD-10-CM

## 2022-08-09 DIAGNOSIS — Z94.0 KIDNEY REPLACED BY TRANSPLANT: ICD-10-CM

## 2022-08-09 DIAGNOSIS — E11.22 TYPE 2 DIABETES MELLITUS WITH STAGE 4 CHRONIC KIDNEY DISEASE, WITHOUT LONG-TERM CURRENT USE OF INSULIN (H): Primary | ICD-10-CM

## 2022-08-09 LAB
ANION GAP SERPL CALCULATED.3IONS-SCNC: 7 MMOL/L (ref 3–14)
BUN SERPL-MCNC: 16 MG/DL (ref 7–30)
CALCIUM SERPL-MCNC: 9.4 MG/DL (ref 8.5–10.1)
CHLORIDE BLD-SCNC: 106 MMOL/L (ref 94–109)
CO2 SERPL-SCNC: 27 MMOL/L (ref 20–32)
CREAT SERPL-MCNC: 0.87 MG/DL (ref 0.66–1.25)
CREAT UR-MCNC: 171 MG/DL
ERYTHROCYTE [DISTWIDTH] IN BLOOD BY AUTOMATED COUNT: 13.1 % (ref 10–15)
GFR SERPL CREATININE-BSD FRML MDRD: >90 ML/MIN/1.73M2
GLUCOSE BLD-MCNC: 192 MG/DL (ref 70–99)
HCT VFR BLD AUTO: 38.3 % (ref 40–53)
HGB BLD-MCNC: 12.4 G/DL (ref 13.3–17.7)
MCH RBC QN AUTO: 29.5 PG (ref 26.5–33)
MCHC RBC AUTO-ENTMCNC: 32.4 G/DL (ref 31.5–36.5)
MCV RBC AUTO: 91 FL (ref 78–100)
MICROALBUMIN UR-MCNC: 12 MG/L
MICROALBUMIN/CREAT UR: 7.02 MG/G CR (ref 0–17)
PLATELET # BLD AUTO: 198 10E3/UL (ref 150–450)
POTASSIUM BLD-SCNC: 3.7 MMOL/L (ref 3.4–5.3)
RBC # BLD AUTO: 4.21 10E6/UL (ref 4.4–5.9)
SODIUM SERPL-SCNC: 140 MMOL/L (ref 133–144)
TACROLIMUS BLD-MCNC: 5.2 UG/L (ref 5–15)
TME LAST DOSE: NORMAL H
TME LAST DOSE: NORMAL H
WBC # BLD AUTO: 4.4 10E3/UL (ref 4–11)

## 2022-08-09 PROCEDURE — 93246 EXT ECG>7D<15D RECORDING: CPT

## 2022-08-09 PROCEDURE — 80048 BASIC METABOLIC PNL TOTAL CA: CPT | Performed by: PATHOLOGY

## 2022-08-09 PROCEDURE — 82043 UR ALBUMIN QUANTITATIVE: CPT | Performed by: PATHOLOGY

## 2022-08-09 PROCEDURE — 80197 ASSAY OF TACROLIMUS: CPT | Performed by: INTERNAL MEDICINE

## 2022-08-09 PROCEDURE — 85027 COMPLETE CBC AUTOMATED: CPT | Performed by: PATHOLOGY

## 2022-08-09 PROCEDURE — 93248 EXT ECG>7D<15D REV&INTERPJ: CPT | Performed by: INTERNAL MEDICINE

## 2022-08-09 PROCEDURE — 36415 COLL VENOUS BLD VENIPUNCTURE: CPT | Performed by: PATHOLOGY

## 2022-08-09 NOTE — PROGRESS NOTES
"Per Dequan Nuñez DO, patient to have 14 day Zio monitor placed.  Diagnosis: cerebrovascular accident (CVA), unspecified mechanism [I63.9]  Monitor placed: {YES / NO:604066::\"Yes\"}  Patient Instructed: {YES / NO:002599::\"Yes\"}  Patient verbalized understanding: {YES / NO:997327::\"Yes\"}  Holter # [id number]    "

## 2022-09-04 DIAGNOSIS — Z94.0 KIDNEY TRANSPLANTED: ICD-10-CM

## 2022-09-04 DIAGNOSIS — E87.20 METABOLIC ACIDOSIS: ICD-10-CM

## 2022-09-04 DIAGNOSIS — Z94.0 KIDNEY REPLACED BY TRANSPLANT: ICD-10-CM

## 2022-09-06 ENCOUNTER — MYC REFILL (OUTPATIENT)
Dept: ENDOCRINOLOGY | Facility: CLINIC | Age: 67
End: 2022-09-06

## 2022-09-06 ENCOUNTER — MYC MEDICAL ADVICE (OUTPATIENT)
Dept: TRANSPLANT | Facility: CLINIC | Age: 67
End: 2022-09-06

## 2022-09-06 DIAGNOSIS — E11.29 TYPE 2 DIABETES MELLITUS WITH OTHER DIABETIC KIDNEY COMPLICATION, WITHOUT LONG-TERM CURRENT USE OF INSULIN (H): ICD-10-CM

## 2022-09-06 RX ORDER — METFORMIN HCL 500 MG
TABLET, EXTENDED RELEASE 24 HR ORAL
Qty: 360 TABLET | Refills: 0 | OUTPATIENT
Start: 2022-09-06

## 2022-09-06 RX ORDER — METFORMIN HCL 500 MG
2000 TABLET, EXTENDED RELEASE 24 HR ORAL
Qty: 360 TABLET | Refills: 0 | Status: SHIPPED | OUTPATIENT
Start: 2022-09-06 | End: 2022-12-15

## 2022-09-06 RX ORDER — TACROLIMUS 1 MG/1
CAPSULE ORAL
Qty: 90 CAPSULE | Refills: 3 | Status: SHIPPED | OUTPATIENT
Start: 2022-09-06 | End: 2023-01-03

## 2022-09-06 NOTE — TELEPHONE ENCOUNTER
JORDI and sent [employer] 9/6/2022 for pt to c/back to schedule w/ PA for type 2 diabetes and Dr. Merida for OA - next available.

## 2022-09-06 NOTE — TELEPHONE ENCOUNTER
Biguanide Agents Failed 09/06/2022 10:28 AM   Protocol Details  Recent (6 mo) or future (30 days) visit within the authorizing provider's specialty

## 2022-09-08 DIAGNOSIS — E11.29 TYPE 2 DIABETES MELLITUS WITH OTHER DIABETIC KIDNEY COMPLICATION, WITHOUT LONG-TERM CURRENT USE OF INSULIN (H): ICD-10-CM

## 2022-09-08 RX ORDER — METFORMIN HCL 500 MG
TABLET, EXTENDED RELEASE 24 HR ORAL
Qty: 360 TABLET | Refills: 0 | OUTPATIENT
Start: 2022-09-08

## 2022-09-08 NOTE — NURSING NOTE
Chief Complaint   Patient presents with     Allied Health Visit     labs and possible aranesp injection     Dose held per anemia protocol. Today's hgb 10.1. Patient informed. Grecia Benton CMA  7/19/2017 1:31 PM       [de-identified] : zilretta right knee today. kaden prn.\par \par ------------------------------------------------------------------------------------------------------------------------------------------------------\par \par The patient was advised of the diagnosis.  The natural history of the pathology was explained in full. All questions were answered.  The risks and benefits of conservative and interventional treatment alternatives were explained to the patient\par \par \par ------------------------------------------------------------------------------------------------------------------------------------------------------\par \par Large joint injection given: Zilretta to Right knee\par \par Patient indicated for injection after trial of rest, OTC medications including aspirin, Ibuprofen, Aleve etc or prescription NSAIDS, and/or exercises at home and/ or physical therapy without satisfactory response.  Patient has symptoms including pain, swelling, and/or decreased mobility in the joint. The risks, benefits, and alternatives to corticosteroid injection were explained in full to the patient, including but not limited to infection, sepsis, bleeding, scarring, skin discoloration, temporary increase in pain, syncopal episode, failure to resolve symptoms, allergic reaction, symptom recurrence, and elevation of blood sugar in diabetics. Patient understood the risks. All questions were answered. After discussion of options, patient requested an injection. \par \par Oral informed consent was obtained and sterile technique was utilized for the procedure including the preparation of the solutions used for the injection and betadine followed by alcohol prep to the injection site. Anesthesia was given with ethyl chloride sprayed topically. Injection of 32 units Zilretta was delivered with medications indicated below to the affected area. . Patient tolerated the procedure well. \par \par Post Procedure Instructions: Patient was advised to call if redness, pain, or fever occur and apply ice for 15 min on and 15 min off later today\par \par Medications delivered: Zilretta: 32 Units in 5cc suspension, Lidocaine: 2cc\par \par \par

## 2022-09-08 NOTE — TELEPHONE ENCOUNTER
JORDI 9/8/2022 for pt to c/back to schedule w/ PA for type 2 diabetes and Dr. Merida for OA - next available.

## 2022-09-09 ENCOUNTER — OFFICE VISIT (OUTPATIENT)
Dept: URGENT CARE | Facility: URGENT CARE | Age: 67
End: 2022-09-09
Payer: MEDICARE

## 2022-09-09 VITALS
BODY MASS INDEX: 26.48 KG/M2 | TEMPERATURE: 98.5 F | HEART RATE: 91 BPM | DIASTOLIC BLOOD PRESSURE: 96 MMHG | SYSTOLIC BLOOD PRESSURE: 145 MMHG | WEIGHT: 178 LBS | OXYGEN SATURATION: 99 %

## 2022-09-09 DIAGNOSIS — R07.0 THROAT PAIN: Primary | ICD-10-CM

## 2022-09-09 DIAGNOSIS — D84.9 IMMUNOSUPPRESSION (H): ICD-10-CM

## 2022-09-09 LAB
DEPRECATED S PYO AG THROAT QL EIA: NEGATIVE
GROUP A STREP BY PCR: NOT DETECTED

## 2022-09-09 PROCEDURE — 87651 STREP A DNA AMP PROBE: CPT | Performed by: FAMILY MEDICINE

## 2022-09-09 PROCEDURE — U0005 INFEC AGEN DETEC AMPLI PROBE: HCPCS | Performed by: FAMILY MEDICINE

## 2022-09-09 PROCEDURE — U0003 INFECTIOUS AGENT DETECTION BY NUCLEIC ACID (DNA OR RNA); SEVERE ACUTE RESPIRATORY SYNDROME CORONAVIRUS 2 (SARS-COV-2) (CORONAVIRUS DISEASE [COVID-19]), AMPLIFIED PROBE TECHNIQUE, MAKING USE OF HIGH THROUGHPUT TECHNOLOGIES AS DESCRIBED BY CMS-2020-01-R: HCPCS | Performed by: FAMILY MEDICINE

## 2022-09-09 PROCEDURE — 99214 OFFICE O/P EST MOD 30 MIN: CPT | Mod: CS | Performed by: FAMILY MEDICINE

## 2022-09-09 RX ORDER — NYSTATIN 100000/ML
500000 SUSPENSION, ORAL (FINAL DOSE FORM) ORAL 4 TIMES DAILY
Qty: 90 ML | Refills: 0 | Status: SHIPPED | OUTPATIENT
Start: 2022-09-09 | End: 2023-06-19

## 2022-09-09 NOTE — PROGRESS NOTES
ICD-10-CM    1. Throat pain  R07.0 Streptococcus A Rapid Screen w/Reflex to PCR - Clinic Collect     Symptomatic; Yes; 9/6/2022 COVID-19 Virus (Coronavirus) by PCR Nose     Group A Streptococcus PCR Throat Swab     nystatin (MYCOSTATIN) 917069 UNIT/ML suspension   2. Immunosuppression (H)  D84.9    ? Etiology. Possibly mild viral illness or allergies. At higher risk due to immunosuppesssion. He is at higher risk for thrush due to this and flonase as well. Given the red lesions and the buccal mucosa rash will treat for this. strep is negative. the covid is pending.    -------------------------------  Murray Nicholson with presents with 1-2 days symptoms including sore throat, mild cough and dry scratchy throat/change in voice.    The patient has a history of heart and kdiney transplant on immune suppressant meds. The patient has a history of allergies on flonase. Often this is worse in the fall.     Treatment measures tried include None tried.  Exposures--no  Recent travel--no    Current Outpatient Medications   Medication Sig Dispense Refill     amLODIPine (NORVASC) 5 MG tablet Take 1 tablet (5 mg) by mouth daily 90 tablet 3     aspirin 81 MG EC tablet Take 81 mg by mouth every evening       atorvastatin (LIPITOR) 40 MG tablet TAKE 1 TABLET(40 MG) BY MOUTH DAILY (Patient taking differently: Take 40 mg by mouth every evening) 90 tablet 3     biotin 1000 MCG TABS tablet Take 1,000 mcg by mouth every morning Patient takes every other day       blood glucose (ACCU-CHEK GUIDE) test strip Use to test blood sugar 3 times daily or as directed. 300 strip 3     blood glucose monitoring (ACCU-CHEK FASTCLIX) lancets USE TO TEST 3 TIMES DAILY OR AS DIRECTED. 300 each 3     calcium citrate and vitamin D (CITRACAL) 200-250 MG-UNIT TABS per tablet Take 1 tablet by mouth 2 times daily       Calcium Polycarbophil (FIBER) 625 MG tablet Take by mouth every morning       co-enzyme Q-10 30 MG CAPS capsule Take by mouth every morning        fluticasone (FLONASE) 50 MCG/ACT nasal spray SHAKE LIQUID AND USE 1 SPRAY IN EACH NOSTRIL DAILY (Patient taking differently: every evening SHAKE LIQUID AND USE 1 SPRAY IN EACH NOSTRIL DAILY) 16 g 11     loratadine (CLARITIN) 10 MG tablet Take 10 mg by mouth every evening Patient takes at bedtime       magnesium 250 MG tablet Take 1 tablet by mouth every morning       metFORMIN (GLUCOPHAGE XR) 500 MG 24 hr tablet Take 4 tablets (2,000 mg) by mouth daily (with breakfast) Please call  to schedule a follow up visit. 360 tablet 0     Misc Natural Products (TART CHERRY ADVANCED PO) Take by mouth every morning       Multiple Vitamins-Minerals (HAIR SKIN NAILS PO) Take by mouth every morning       multivitamin (CENTRUM SILVER) tablet Take 1 tablet by mouth every morning       mycophenolate (GENERIC EQUIVALENT) 250 MG capsule Take 3 capsules (750 mg) by mouth 2 times daily 180 capsule 11     nystatin (MYCOSTATIN) 814980 UNIT/ML suspension Take 5 mLs (500,000 Units) by mouth 4 times daily 90 mL 0     OMEPRAZOLE PO Take 20 mg by mouth every evening       pantoprazole (PROTONIX) 40 MG EC tablet Take 40 mg by mouth every morning       senna-docusate (SENOKOT-S/PERICOLACE) 8.6-50 MG tablet Take 1-2 tablets by mouth 2 times daily 30 tablet 0     sulfamethoxazole-trimethoprim (BACTRIM) 400-80 MG tablet Take 1 tablet by mouth 2 times daily       sulfamethoxazole-trimethoprim (BACTRIM) 400-80 MG tablet Take 1 tablet by mouth daily (Patient taking differently: Take 1 tablet by mouth every morning) 30 tablet 11     tacrolimus (GENERIC EQUIVALENT) 0.5 MG capsule Take 1 capsule (0.5 mg) by mouth every evening 90 capsule 3     tacrolimus (GENERIC EQUIVALENT) 1 MG capsule TAKE 1 CAPSULE BY MOUTH EVERY MORNING. 90 capsule 3     Zinc Sulfate (ZINC 15 PO) Take by mouth every morning         ROS otherwise negative for resp., ID,  HEENT symptoms.    Objective: BP (!) 145/96   Pulse 91   Temp 98.5  F (36.9  C) (Tympanic)   Wt  80.7 kg (178 lb)   SpO2 99%   BMI 26.48 kg/m    Exam:  GENERAL APPEARANCE: healthy, alert and no distress  EYES: Eyes grossly normal to inspection  HENT: ear canals and TM's normal the throat is clear except for a few erythematous 2-3 mm paples on the soft palate and mild erythematous streaking. There eis some whitish ti rash on the right buccal mucosa  NECK: no adenopathy, no asymmetry, masses, or scars and thyroid normal to palpation  RESP: lungs clear to auscultation - no rales, rhonchi or wheezes  CV: regular rates and rhythm, no murmur

## 2022-09-10 LAB — SARS-COV-2 RNA RESP QL NAA+PROBE: NEGATIVE

## 2022-09-13 ENCOUNTER — MYC MEDICAL ADVICE (OUTPATIENT)
Dept: FAMILY MEDICINE | Facility: CLINIC | Age: 67
End: 2022-09-13

## 2022-09-13 ENCOUNTER — LAB (OUTPATIENT)
Dept: LAB | Facility: CLINIC | Age: 67
End: 2022-09-13
Payer: MEDICARE

## 2022-09-13 ENCOUNTER — TELEPHONE (OUTPATIENT)
Dept: TRANSPLANT | Facility: CLINIC | Age: 67
End: 2022-09-13

## 2022-09-13 DIAGNOSIS — Z94.0 KIDNEY REPLACED BY TRANSPLANT: ICD-10-CM

## 2022-09-13 LAB
ANION GAP SERPL CALCULATED.3IONS-SCNC: 11 MMOL/L (ref 7–15)
BUN SERPL-MCNC: 14.5 MG/DL (ref 8–23)
CALCIUM SERPL-MCNC: 9.8 MG/DL (ref 8.8–10.2)
CHLORIDE SERPL-SCNC: 103 MMOL/L (ref 98–107)
CREAT SERPL-MCNC: 0.99 MG/DL (ref 0.67–1.17)
DEPRECATED HCO3 PLAS-SCNC: 23 MMOL/L (ref 22–29)
ERYTHROCYTE [DISTWIDTH] IN BLOOD BY AUTOMATED COUNT: 12.9 % (ref 10–15)
GFR SERPL CREATININE-BSD FRML MDRD: 83 ML/MIN/1.73M2
GLUCOSE SERPL-MCNC: 262 MG/DL (ref 70–99)
HCT VFR BLD AUTO: 38.8 % (ref 40–53)
HGB BLD-MCNC: 12.6 G/DL (ref 13.3–17.7)
MCH RBC QN AUTO: 29.3 PG (ref 26.5–33)
MCHC RBC AUTO-ENTMCNC: 32.5 G/DL (ref 31.5–36.5)
MCV RBC AUTO: 90 FL (ref 78–100)
PLATELET # BLD AUTO: 209 10E3/UL (ref 150–450)
POTASSIUM SERPL-SCNC: 4.6 MMOL/L (ref 3.4–5.3)
RBC # BLD AUTO: 4.3 10E6/UL (ref 4.4–5.9)
SODIUM SERPL-SCNC: 137 MMOL/L (ref 136–145)
TACROLIMUS BLD-MCNC: 5.8 UG/L (ref 5–15)
TME LAST DOSE: NORMAL H
TME LAST DOSE: NORMAL H
WBC # BLD AUTO: 4.5 10E3/UL (ref 4–11)

## 2022-09-13 PROCEDURE — 85027 COMPLETE CBC AUTOMATED: CPT | Performed by: PATHOLOGY

## 2022-09-13 PROCEDURE — 80048 BASIC METABOLIC PNL TOTAL CA: CPT | Performed by: PATHOLOGY

## 2022-09-13 PROCEDURE — 36415 COLL VENOUS BLD VENIPUNCTURE: CPT | Performed by: PATHOLOGY

## 2022-09-13 PROCEDURE — 80197 ASSAY OF TACROLIMUS: CPT | Performed by: INTERNAL MEDICINE

## 2022-09-13 NOTE — TELEPHONE ENCOUNTER
ISSUE: hyperglycemia,  with transplant labs on 9/13    LPN TASK:    Please call patient to ensure he returns call to endocrine coordinator Mali Sher to scheduled endocrine apt.  Ensure patient is monitoring BG at home.      Thank you.    Colette Martin RN   Transplant Coordinator  [phone]

## 2022-09-13 NOTE — TELEPHONE ENCOUNTER
Call placed to patient. Patient note that he has an appointment scheduled for DM control and to began monitoring his BG at home.

## 2022-09-16 NOTE — TELEPHONE ENCOUNTER
Alicia  Patient is asking about another Evushield Vaccine but I believe this is actually the monoclonal antibodies.  Please advise.    Also patient is asking about annual Flu shots.  I did let him know these are NOT annual vaccines.  When would he be due for another Pneumonia vaccine?  Ashlie Lopez RN  [facility]

## 2022-09-16 NOTE — TELEPHONE ENCOUNTER
I agree with RN recommendations.      Should get COVID bivalent and flu this year.  Current on PCV.      Do not recommend monoclonal antibodies as a preventative measure.  If he develops COVID infection, he could be referred.

## 2022-09-19 NOTE — TELEPHONE ENCOUNTER
Writer responded via [employer].    REMI KaurN, RN  [facility]

## 2022-10-10 ENCOUNTER — TELEPHONE (OUTPATIENT)
Dept: FAMILY MEDICINE | Facility: CLINIC | Age: 67
End: 2022-10-10

## 2022-10-10 DIAGNOSIS — Z94.1 HEART REPLACED BY TRANSPLANT (H): ICD-10-CM

## 2022-10-10 NOTE — TELEPHONE ENCOUNTER
Call received from patient, and a dental clinic staff member was also on phone call via speaker:  1. Due to patient's health history (heart transplant), it is recommended patient be given prophylactic antibiotic medication  2. Usually give Cleocin, but patient is allergic to Penicillins  3. Patient requested writer review antibiotic history over phone-   A. Writer reviewed Flagyl, Bactrim and Cipro have been prescribed within the past few years  4. Has patient been prescribed Amoxicillin?   A. Informed patient/dental staff member writer does not find any history Amoxicillin was ever prescribed.    Discussed with patient Cardiologist and/or Transplant team would be ideal to give input as to which antibiotic they recommend for patient for prophylactic treatment.    Patient verbalized understanding and in agreement with plan.    REMI KaurN, RN-BC  [facility]

## 2022-10-11 ENCOUNTER — LAB (OUTPATIENT)
Dept: LAB | Facility: CLINIC | Age: 67
End: 2022-10-11
Payer: MEDICARE

## 2022-10-11 DIAGNOSIS — Z94.0 KIDNEY REPLACED BY TRANSPLANT: ICD-10-CM

## 2022-10-11 LAB
ANION GAP SERPL CALCULATED.3IONS-SCNC: 9 MMOL/L (ref 7–15)
BUN SERPL-MCNC: 19.2 MG/DL (ref 8–23)
CALCIUM SERPL-MCNC: 9.8 MG/DL (ref 8.8–10.2)
CHLORIDE SERPL-SCNC: 105 MMOL/L (ref 98–107)
CREAT SERPL-MCNC: 1 MG/DL (ref 0.67–1.17)
DEPRECATED HCO3 PLAS-SCNC: 25 MMOL/L (ref 22–29)
ERYTHROCYTE [DISTWIDTH] IN BLOOD BY AUTOMATED COUNT: 12.9 % (ref 10–15)
GFR SERPL CREATININE-BSD FRML MDRD: 82 ML/MIN/1.73M2
GLUCOSE SERPL-MCNC: 183 MG/DL (ref 70–99)
HCT VFR BLD AUTO: 39.1 % (ref 40–53)
HGB BLD-MCNC: 12.9 G/DL (ref 13.3–17.7)
MCH RBC QN AUTO: 29.1 PG (ref 26.5–33)
MCHC RBC AUTO-ENTMCNC: 33 G/DL (ref 31.5–36.5)
MCV RBC AUTO: 88 FL (ref 78–100)
PLATELET # BLD AUTO: 198 10E3/UL (ref 150–450)
POTASSIUM SERPL-SCNC: 4.3 MMOL/L (ref 3.4–5.3)
RBC # BLD AUTO: 4.44 10E6/UL (ref 4.4–5.9)
SODIUM SERPL-SCNC: 139 MMOL/L (ref 136–145)
TACROLIMUS BLD-MCNC: 5.6 UG/L (ref 5–15)
TME LAST DOSE: NORMAL H
TME LAST DOSE: NORMAL H
WBC # BLD AUTO: 4.3 10E3/UL (ref 4–11)

## 2022-10-11 PROCEDURE — 80197 ASSAY OF TACROLIMUS: CPT | Performed by: INTERNAL MEDICINE

## 2022-10-11 PROCEDURE — 36415 COLL VENOUS BLD VENIPUNCTURE: CPT | Performed by: PATHOLOGY

## 2022-10-11 PROCEDURE — 80048 BASIC METABOLIC PNL TOTAL CA: CPT | Performed by: PATHOLOGY

## 2022-10-11 PROCEDURE — 85027 COMPLETE CBC AUTOMATED: CPT | Performed by: PATHOLOGY

## 2022-10-11 RX ORDER — ATORVASTATIN CALCIUM 40 MG/1
40 TABLET, FILM COATED ORAL EVERY EVENING
Qty: 90 TABLET | Refills: 3 | Status: SHIPPED | OUTPATIENT
Start: 2022-10-11 | End: 2023-01-11

## 2022-10-22 ENCOUNTER — IMMUNIZATION (OUTPATIENT)
Dept: FAMILY MEDICINE | Facility: CLINIC | Age: 67
End: 2022-10-22
Payer: MEDICARE

## 2022-10-22 DIAGNOSIS — Z23 NEED FOR PROPHYLACTIC VACCINATION AND INOCULATION AGAINST INFLUENZA: Primary | ICD-10-CM

## 2022-10-22 PROCEDURE — 99207 PR NO CHARGE NURSE ONLY: CPT

## 2022-10-22 PROCEDURE — G0008 ADMIN INFLUENZA VIRUS VAC: HCPCS

## 2022-10-22 PROCEDURE — 90662 IIV NO PRSV INCREASED AG IM: CPT

## 2022-10-22 NOTE — NURSING NOTE
Prior to immunization administration, verified patients identity using patient s name and date of birth. Please see Immunization Activity for additional information.     Screening Questionnaire for Adult Immunization    Are you sick today?   No   Do you have allergies to medications, food, a vaccine component or latex?   No   Have you ever had a serious reaction after receiving a vaccination?   No   Do you have a long-term health problem with heart, lung, kidney, or metabolic disease (e.g., diabetes), asthma, a blood disorder, no spleen, complement component deficiency, a cochlear implant, or a spinal fluid leak?  Are you on long-term aspirin therapy?   No   Do you have cancer, leukemia, HIV/AIDS, or any other immune system problem?   No   Do you have a parent, brother, or sister with an immune system problem?   No   In the past 3 months, have you taken medications that affect  your immune system, such as prednisone, other steroids, or anticancer drugs; drugs for the treatment of rheumatoid arthritis, Crohn s disease, or psoriasis; or have you had radiation treatments?   No   Have you had a seizure, or a brain or other nervous system problem?   No   During the past year, have you received a transfusion of blood or blood    products, or been given immune (gamma) globulin or antiviral drug?   No   For women: Are you pregnant or is there a chance you could become       pregnant during the next month?   No   Have you received any vaccinations in the past 4 weeks?   No     Immunization questionnaire answers were all negative.        Per orders of Dr. wray, injection of fluzone hd given by Elina Granado. Patient instructed to remain in clinic for 15 minutes afterwards, and to report any adverse reaction to me immediately.    Elina Granado on 10/22/2022 at 9:39 AM         Screening performed by Elina Granado on 10/22/2022 at 9:38 AM.

## 2022-10-31 ENCOUNTER — ANCILLARY PROCEDURE (OUTPATIENT)
Dept: MRI IMAGING | Facility: CLINIC | Age: 67
End: 2022-10-31
Attending: INTERNAL MEDICINE
Payer: MEDICARE

## 2022-10-31 DIAGNOSIS — K86.2 PANCREATIC CYST: ICD-10-CM

## 2022-10-31 PROCEDURE — 74183 MRI ABD W/O CNTR FLWD CNTR: CPT | Performed by: RADIOLOGY

## 2022-10-31 PROCEDURE — A9585 GADOBUTROL INJECTION: HCPCS | Performed by: RADIOLOGY

## 2022-10-31 RX ORDER — GADOBUTROL 604.72 MG/ML
7.5 INJECTION INTRAVENOUS ONCE
Status: COMPLETED | OUTPATIENT
Start: 2022-10-31 | End: 2022-10-31

## 2022-10-31 RX ADMIN — GADOBUTROL 7.5 ML: 604.72 INJECTION INTRAVENOUS at 18:11

## 2022-10-31 NOTE — PROGRESS NOTES
Murray is a 67 year old who is being evaluated via a billable video visit.      How would you like to obtain your AVS? [employer]  If the video visit is dropped, the invitation should be resent by: Text to cell phone: [phone]  Will anyone else be joining your video visit? No      Outcome for 10/31/22 11:30 AM: [employer] message sent  HARLAN Pardo  Outcome for 11/02/22 9:51 AM: Glucose Readings sent via [employer]  Paulina Curiel, VF

## 2022-11-01 NOTE — TELEPHONE ENCOUNTER
DIAGNOSIS: Muscle strain and pain in area below elbow. when grasping and twisting objects and lifting and reaching.v   APPOINTMENT DATE: 11.14.22   NOTES STATUS DETAILS   XRAYS (IMAGES & REPORTS) Internal 7.24.19 Abner  7.12.19

## 2022-11-04 ENCOUNTER — IMMUNIZATION (OUTPATIENT)
Dept: NURSING | Facility: CLINIC | Age: 67
End: 2022-11-04
Payer: MEDICARE

## 2022-11-04 ENCOUNTER — TELEPHONE (OUTPATIENT)
Dept: ENDOCRINOLOGY | Facility: CLINIC | Age: 67
End: 2022-11-04

## 2022-11-04 ENCOUNTER — VIRTUAL VISIT (OUTPATIENT)
Dept: ENDOCRINOLOGY | Facility: CLINIC | Age: 67
End: 2022-11-04
Payer: MEDICARE

## 2022-11-04 DIAGNOSIS — E11.29 TYPE 2 DIABETES MELLITUS WITH OTHER DIABETIC KIDNEY COMPLICATION, WITHOUT LONG-TERM CURRENT USE OF INSULIN (H): Primary | ICD-10-CM

## 2022-11-04 PROCEDURE — 0134A COVID-19,PF,MODERNA BIVALENT: CPT

## 2022-11-04 PROCEDURE — 99215 OFFICE O/P EST HI 40 MIN: CPT | Mod: 95 | Performed by: PHYSICIAN ASSISTANT

## 2022-11-04 PROCEDURE — 91313 COVID-19,PF,MODERNA BIVALENT: CPT

## 2022-11-04 NOTE — LETTER
11/4/2022       RE: Murray Nicholson  [address]  [place] [address]     Dear Colleague,    Thank you for referring your patient, Murray Nicholson, to the [facility] [place] [facility]. Please see a copy of my visit note below.    Murray is a 67 year old who is being evaluated via a billable video visit.      How would you like to obtain your AVS? Contapps  If the video visit is dropped, the invitation should be resent by: Text to cell phone: [phone]  Will anyone else be joining your video visit? No      Outcome for 10/31/22 11:30 AM: [employer] message sent  HARLAN Pardo  Outcome for 11/02/22 9:51 AM: Glucose Readings sent via [employer]  HARLAN Dubose          Due to the COVID 19 pandemic this visit was converted to a video visit in order to help prevent spread of infection in this patient and the general population.    Time of start: 3:30 pm  Time of end: 3:47 pm  Total duration of video visit: 17 minutes.  Provider's location: offsite.    HPI  Murray Nicholson is a 67 year old male with type 2 diabetes mellitus. Video visit for diabetes follow up today.  Pt last seen by Dr. Merida in Aug 2021.  Pt gives a hx of type 2 diabetes > 20 years complicated by ESRD s/p kidney transplant in Dec 2019.   Pt denies known hx of retinopathy or sx of neuropathy.  He has hx CAD s/p heart transplant in June 2018.  Pt also has hx of HTN, hyperlipidemia, ascending aortic aneurysm and GERD.  For his diabetes, he is currently taking Metformin 500 mg 4 tabs in am.  Most recent A1C was 7.0 % on 6/13/2022.  For the past few days, he states he has been eating healthier and smaller portions.  His blood sugars have improved for the past 5 days.  See blood sugar data scanned below.  On ROS today, he is eating healthier now and smaller portions and less sweets.  He has increased his activity.  Denies blurred vision, n/v, SOB at rest, cough,  fever, chest pain or abd pain.  No diarrhea, dysuria or hematuria.  He denies sx of neuropathy or foot ulcers.    Diabetes Care  Retinopathy: none per patient; reminded him to see Oph annually.  Nephropathy:hx of ESRD s/p kidney transplant in 12/2019.  Neuropathy: none per patient.  Foot Exam: no exam today.  Taking aspirin: yes.  Lipids: LDL 35 in 6/2022. Pt taking Lipitor.  Insulin: none.  DM meds: Metformin.  Testing: glucose meter.                ROS  See under HPI.      Allergies  Allergies   Allergen Reactions     Norco [Hydrocodone-Acetaminophen] Nausea and Vomiting     Cats      Throat tightness     Isosorbide Other (See Comments)     hypotension     Penicillins Hives     Seasonal Allergies      rhinitis     Shrimp      Throat closes        Medications  Current Outpatient Medications   Medication Sig Dispense Refill     amLODIPine (NORVASC) 5 MG tablet Take 1 tablet (5 mg) by mouth daily 90 tablet 3     aspirin 81 MG EC tablet Take 81 mg by mouth every evening       atorvastatin (LIPITOR) 40 MG tablet Take 1 tablet (40 mg) by mouth every evening 90 day supply if able 90 tablet 3     biotin 1000 MCG TABS tablet Take 1,000 mcg by mouth every morning Patient takes every other day       blood glucose (ACCU-CHEK GUIDE) test strip Use to test blood sugar 3 times daily or as directed. 300 strip 3     blood glucose monitoring (ACCU-CHEK FASTCLIX) lancets USE TO TEST 3 TIMES DAILY OR AS DIRECTED. 300 each 3     calcium citrate and vitamin D (CITRACAL) 200-250 MG-UNIT TABS per tablet Take 1 tablet by mouth 2 times daily       Calcium Polycarbophil (FIBER) 625 MG tablet Take by mouth every morning       co-enzyme Q-10 30 MG CAPS capsule Take by mouth every morning       fluticasone (FLONASE) 50 MCG/ACT nasal spray SHAKE LIQUID AND USE 1 SPRAY IN EACH NOSTRIL DAILY (Patient taking differently: every evening SHAKE LIQUID AND USE 1 SPRAY IN EACH NOSTRIL DAILY) 16 g 11     loratadine (CLARITIN) 10 MG tablet Take 10 mg by  mouth every evening Patient takes at bedtime       magnesium 250 MG tablet Take 1 tablet by mouth every morning       metFORMIN (GLUCOPHAGE XR) 500 MG 24 hr tablet Take 4 tablets (2,000 mg) by mouth daily (with breakfast) Please call  to schedule a follow up visit. 360 tablet 0     Misc Natural Products (TART CHERRY ADVANCED PO) Take by mouth every morning       Multiple Vitamins-Minerals (HAIR SKIN NAILS PO) Take by mouth every morning       multivitamin (CENTRUM SILVER) tablet Take 1 tablet by mouth every morning       mycophenolate (GENERIC EQUIVALENT) 250 MG capsule Take 3 capsules (750 mg) by mouth 2 times daily 180 capsule 11     nystatin (MYCOSTATIN) 112785 UNIT/ML suspension Take 5 mLs (500,000 Units) by mouth 4 times daily 90 mL 0     OMEPRAZOLE PO Take 20 mg by mouth every evening       sulfamethoxazole-trimethoprim (BACTRIM) 400-80 MG tablet Take 1 tablet by mouth 2 times daily       sulfamethoxazole-trimethoprim (BACTRIM) 400-80 MG tablet Take 1 tablet by mouth daily (Patient taking differently: Take 1 tablet by mouth every morning) 30 tablet 11     tacrolimus (GENERIC EQUIVALENT) 0.5 MG capsule Take 1 capsule (0.5 mg) by mouth every evening 90 capsule 3     tacrolimus (GENERIC EQUIVALENT) 1 MG capsule TAKE 1 CAPSULE BY MOUTH EVERY MORNING. 90 capsule 3       Family History  family history includes C.A.D. in his father; Cerebrovascular Disease in his father; Diabetes in his father; Hypertension in his father.    Social History   reports that he quit smoking about 28 years ago. His smoking use included cigarettes. He started smoking about 38 years ago. He has a 20.00 pack-year smoking history. He has never used smokeless tobacco. He reports current alcohol use. He reports that he does not use drugs.     Past Medical History  Past Medical History:   Diagnosis Date     (HFpEF) heart failure with preserved ejection fraction (H)      Allergic rhinitis, cause unspecified      Anemia of chronic  kidney failure      Aortic stenosis 08/13/2008    Overview:  Bicuspid aortic valve     AS (aortic stenosis)      Ascending aortic aneurysm      Bicuspid aortic valve      Bilateral hand pain 06/01/2021     CAD (coronary artery disease)      Congestive heart failure, unspecified      Coronary artery disease involving native artery of transplanted heart without angina pectoris 07/10/2014     Dialysis patient (H)     [facility]     Dyslipidemia      Esophageal reflux      ESRD (end stage renal disease) (H)      Hearing problem      Heart replaced by transplant (H) 12/10/2018     Hypersomnia with sleep apnea, unspecified      Hypertension      Ileostomy status (H)      Immunosuppression (H)      MGUS (monoclonal gammopathy of unknown significance)      Mitral regurgitation      SHEELA (obstructive sleep apnea)     No CPAP     Pneumonia      Sigmoid diverticulitis 08/14/2018     Status post coronary angiogram 06/28/2019     Systolic heart failure (H)      Type 2 diabetes mellitus (H)      Ventral hernia without obstruction or gangrene        Past Surgical History:   Procedure Laterality Date     BIOPSY       CARDIAC SURGERY       COLONOSCOPY N/A 5/3/2018    Procedure: COLONOSCOPY;  colonoscopy ;  Surgeon: Ammon Castillo MD;  Location: [facility]     CREATE FISTULA ARTERIOVENOUS UPPER EXTREMITY BOVINE Left 5/8/2019    Procedure: Left Upper Extremity Arteriovenous Bovine Graft Creation;  Surgeon: Calin Cheney MD;  Location: [facility]     CV CORONARY ANGIOGRAM N/A 6/28/2019    Procedure: CV CORONARY ANGIOGRAM;  Surgeon: Montrell Posada MD;  Location:  HEART CARDIAC CATH LAB     CV CORONARY ANGIOGRAM N/A 7/15/2020    Procedure: CV CORONARY ANGIOGRAM;  Surgeon: Marcelo Ramírez MD;  Location:  HEART CARDIAC CATH LAB     CV CORONARY ANGIOGRAM N/A 6/7/2021    Procedure: CV CORONARY ANGIOGRAM;  Surgeon: Gilberto Mccann MD;  Location:  HEART CARDIAC CATH LAB     CV CORONARY ANGIOGRAM N/A 6/13/2022     Procedure: Coronary Angiogram;  Surgeon: Marcelo Ramírez MD;  [facility]     CV HEART BIOPSY N/A 2/1/2019    Procedure: HBX;  Surgeon: Montrell Posada MD;  [facility]     CV HEART BIOPSY N/A 3/22/2019    Procedure: HBX, RIJV ACCESS;  Surgeon: Jordan Fox MD;  [facility]     CV HEART BIOPSY N/A 6/28/2019    Procedure: CV HEART BIOPSY;  Surgeon: Montrell Posada MD;  [facility]     CV HEART BIOPSY N/A 10/28/2019    Procedure: CV HEART BIOPSY;  Surgeon: Marcelo Ramírez MD;  Location:  HEART CARDIAC CATH LAB     CV HEART BIOPSY N/A 2/6/2020    Procedure: CV HEART BIOPSY;  Surgeon: Montrell Posada MD;  Location:  HEART CARDIAC CATH LAB     CV HEART BIOPSY N/A 7/15/2020    Procedure: CV HEART BIOPSY;  Surgeon: Marcelo Ramírez MD;  Location:  HEART CARDIAC CATH LAB     CV RIGHT HEART CATH MEASUREMENTS RECORDED N/A 6/28/2019    Procedure: CV RIGHT HEART CATH;  Surgeon: Montrell Posada MD;  Location:  HEART CARDIAC CATH LAB     CV RIGHT HEART CATH MEASUREMENTS RECORDED N/A 7/15/2020    Procedure: CV RIGHT HEART CATH;  Surgeon: Marcelo Ramírez MD;  Location:  HEART CARDIAC CATH LAB     CV RIGHT HEART CATH MEASUREMENTS RECORDED N/A 6/7/2021    Procedure: CV RIGHT HEART CATH;  Surgeon: Gilberto Mccann MD;  Location:  HEART CARDIAC CATH LAB     CV RIGHT HEART CATH MEASUREMENTS RECORDED N/A 6/13/2022    Procedure: Right Heart Catheterization;  Surgeon: Marcelo Ramírez MD;  Location:  HEART CARDIAC CATH LAB     ENDOSCOPIC ULTRASOUND UPPER GASTROINTESTINAL TRACT (GI) N/A 11/8/2021    Procedure: ENDOSCOPIC ULTRASOUND, ESOPHAGOSCOPY / UPPER GASTROINTESTINAL TRACT (GI)  EUS with FNA;  Surgeon: Alon Don MD;  Location:  OR     ESOPHAGOSCOPY, GASTROSCOPY, DUODENOSCOPY (EGD), COMBINED N/A 5/7/2018    Procedure: COMBINED ENDOSCOPIC ULTRASOUND,  ESOPHAGOSCOPY, GASTROSCOPY, DUODENOSCOPY (EGD), FINE NEEDLE ASPIRATE/BIOPSY;  Endoscopic Ultrasound with Fine Needle Aspiration ;  Surgeon: Alon Don MD;  [facility]     EXAM UNDER ANESTHESIA RECTUM N/A 8/12/2018    Procedure: EXAM UNDER ANESTHESIA RECTUM;  EXAM UNDER ANESTHESIA RECTUM ,COMBINED INCISION AND DRAINAGE OF RECTAL ABCESS ;  Surgeon: Rick Tran MD;  [facility]     HERNIORRHAPHY VENTRAL N/A 6/24/2022    Procedure: open mesh repair, incisional ventral hernia;  Surgeon: Shaheed Curtis MD;  [facility]     INCISION AND DRAINAGE RECTUM, COMBINED N/A 8/12/2018    Procedure: COMBINED INCISION AND DRAINAGE RECTUM;;  Surgeon: Rick Tran MD;  [facility]     IR DIALYSIS FISTULOGRAM LEFT  9/25/2019     IR DIALYSIS FISTULOGRAM LEFT  11/22/2019     IR DIALYSIS PTA  9/25/2019     IR DIALYSIS PTA  11/22/2019     LAPAROSCOPIC ASSISTED COLOSTOMY TAKEDOWN N/A 12/11/2018    Procedure: Laparoscopic Assisted Colostomy Takedown, Laparoscopic Lysis of Adhesions;  Surgeon: Rick Tran MD;  [facility]     LAPAROSCOPIC ASSISTED SIGMOID COLECTOMY N/A 8/14/2018    Procedure: LAPAROSCOPIC ASSISTED SIGMOID COLECTOMY;  Laparoscopic Hand Assisted Takedown of Splenic Flexure, Sigmoidectomy, Small Bowel Resection, Takedown of Small Bowel to Colon Fistula;  Surgeon: Rick Tran MD;  [facility]     LAPAROSCOPIC HERNIORRHAPHY INGUINAL BILATERAL Bilateral 7/24/2015    Procedure: LAPAROSCOPIC HERNIORRHAPHY INGUINAL BILATERAL;  Surgeon: Bobby Mcconnell MD;  [facility]     LAPAROSCOPIC INSERTION CATHETER PERITONEAL DIALYSIS N/A 6/22/2017    Procedure: LAPAROSCOPIC INSERTION CATHETER PERITONEAL DIALYSIS;  Laparoscopic Peritoneal Dialysis Catheter Placement - Anesthesia with block;  Surgeon: Esteban Arvizu MD;  [facility]     PICC INSERTION Left 04/22/2018    5Fr - 49cm (3cm external), Basilic vein, low SVC     REMOVE  CATHETER PERITONEAL Right 1/15/2018    Procedure: REMOVE CATHETER PERITONEAL;  Open Removal of Peritoneal Dialysis Catheter ;  Surgeon: Esteban Arvizu MD;  [facility]     SIGMOIDOSCOPY FLEXIBLE N/A 11/21/2018    Procedure: Examination Under Anesthesia, Flexible Sigmoidoscopy and Polypectomy;  Surgeon: Rick Tran MD;  [facility]     SIGMOIDOSCOPY FLEXIBLE N/A 12/11/2018    Procedure: Flexible Sigmoidoscopy;  Surgeon: Rick Tran MD;  [facility]     TAKEDOWN ILEOSTOMY N/A 3/27/2019    Procedure: Takedown Ileostomy;  Surgeon: Rick Tran MD;  [facility]     TRANSPLANT HEART RECIPIENT N/A 6/14/2018    Procedure: TRANSPLANT HEART RECIPIENT;  Median Sternotomy, on-pump oxygenator, Heart Transplant;  Surgeon: Rony Caputo MD;  [facility]     TRANSPLANT KIDNEY RECIPIENT LIVING UNRELATED  12/2019       Physical Exam    No exam today.    RESULTS  Creatinine   Date Value Ref Range Status   10/11/2022 1.00 0.67 - 1.17 mg/dL Final   07/06/2021 1.09 0.66 - 1.25 mg/dL Final     GFR Estimate   Date Value Ref Range Status   10/11/2022 82 >60 mL/min/1.73m2 Final     Comment:     Effective December 21, 2021 eGFRcr in adults is calculated using the 2021 CKD-EPI creatinine equation which includes age and gender (Carmita et al., NEJ, DOI: 10.1056/DGLClk4875967)   07/06/2021 70 >60 mL/min/[1.73_m2] Final     Comment:     Non  GFR Calc  Starting 12/18/2018, serum creatinine based estimated GFR (eGFR) will be   calculated using the Chronic Kidney Disease Epidemiology Collaboration   (CKD-EPI) equation.       Hemoglobin A1C   Date Value Ref Range Status   06/13/2022 7.0 (H) 0.0 - 5.6 % Final     Comment:     Normal <5.7%   Prediabetes 5.7-6.4%    Diabetes 6.5% or higher     Note: Adopted from ADA consensus guidelines.   06/07/2021 6.2 (H) 0 - 5.6 % Final     Comment:     Normal <5.7% Prediabetes 5.7-6.4%  Diabetes 6.5% or higher - adopted from ADA    consensus guidelines.       Potassium   Date Value Ref Range Status   10/11/2022 4.3 3.4 - 5.3 mmol/L Final   08/09/2022 3.7 3.4 - 5.3 mmol/L Final   07/06/2021 3.9 3.4 - 5.3 mmol/L Final     ALT   Date Value Ref Range Status   06/13/2022 28 0 - 70 U/L Final   06/07/2021 28 0 - 70 U/L Final     AST   Date Value Ref Range Status   06/13/2022 24 0 - 45 U/L Final   06/07/2021 27 0 - 45 U/L Final     TSH   Date Value Ref Range Status   09/07/2021 1.89 0.40 - 4.00 mU/L Final   04/16/2018 2.25 0.40 - 4.00 mU/L Final       Cholesterol   Date Value Ref Range Status   06/13/2022 114 <200 mg/dL Final   06/07/2021 120 <200 mg/dL Final   12/09/2020 116 <200 mg/dL Final     HDL Cholesterol   Date Value Ref Range Status   06/07/2021 49 >39 mg/dL Final   12/09/2020 47 >39 mg/dL Final     Direct Measure HDL   Date Value Ref Range Status   06/13/2022 45 >=40 mg/dL Final     LDL Cholesterol Calculated   Date Value Ref Range Status   06/13/2022 35 <=100 mg/dL Final   06/07/2021 28 <100 mg/dL Final     Comment:     Desirable:       <100 mg/dl   12/09/2020 26 <100 mg/dL Final     Comment:     Desirable:       <100 mg/dl     Triglycerides   Date Value Ref Range Status   06/13/2022 170 (H) <150 mg/dL Final   06/07/2021 215 (H) <150 mg/dL Final     Comment:     Borderline high:  150-199 mg/dl  High:             200-499 mg/dl  Very high:       >499 mg/dl     12/09/2020 214 (H) <150 mg/dL Final     Comment:     Borderline high:  150-199 mg/dl  High:             200-499 mg/dl  Very high:       >499 mg/dl       Cholesterol/HDL Ratio   Date Value Ref Range Status   08/10/2015 5.0 0.0 - 5.0 Final   04/09/2015 4.0 0.0 - 5.0 Final         ASSESSMENT/PLAN:    1.  TYPE 2 DIABETES MELLITUS: Patient's blood sugar values have improved over the past 5 days. He tells me he is eating healthier, smaller portions and less sweets.  He would like to continue to work on his diet before adding another diabetic medication.  I discuss use of a GLP-1 or  Rachael.  He is to work making healthy food choices, weight loss and exercise.  Will plan to check A1C in Jan 2023.  Reminded pt to see Oph for annual diabetic eye exam.  No sx of neuropathy and he denies foot ulcers.  No vitals today.    2.  HX OF ESRD: S/P kidney transplant in Dec 2019.  Most recent creat 1.00 with GFR 82 mL/min in 10/2022.  Urine protein negative in 8/2022.    3.  CARDIAC: S/P heart transplant in 6/2018.    4.  LIPIDS: LDL 35 in June 2022.  Pt taking Lipitor.    5.  FOLLOW UP: With me in 3 months.  A1C ordered today - to be done in Jan 2023.    Time spent reviewing chart, labs and glucose data today = 8 minutes.  Time for video visit today = 17 minutes.  Time for documentation today = 15 minutes.    Total time for visit today = 40 minutes.    Rosa Calvert PA-C

## 2022-11-06 NOTE — PROGRESS NOTES
Due to the COVID 19 pandemic this visit was converted to a video visit in order to help prevent spread of infection in this patient and the general population.    Time of start: 3:30 pm  Time of end: 3:47 pm  Total duration of video visit: 17 minutes.  Provider's location: offsite.    HPI  Murray Nicholson is a 67 year old male with type 2 diabetes mellitus. Video visit for diabetes follow up today.  Pt last seen by Dr. Merida in Aug 2021.  Pt gives a hx of type 2 diabetes > 20 years complicated by ESRD s/p kidney transplant in Dec 2019.   Pt denies known hx of retinopathy or sx of neuropathy.  He has hx CAD s/p heart transplant in June 2018.  Pt also has hx of HTN, hyperlipidemia, ascending aortic aneurysm and GERD.  For his diabetes, he is currently taking Metformin 500 mg 4 tabs in am.  Most recent A1C was 7.0 % on 6/13/2022.  For the past few days, he states he has been eating healthier and smaller portions.  His blood sugars have improved for the past 5 days.  See blood sugar data scanned below.  On ROS today, he is eating healthier now and smaller portions and less sweets.  He has increased his activity.  Denies blurred vision, n/v, SOB at rest, cough, fever, chest pain or abd pain.  No diarrhea, dysuria or hematuria.  He denies sx of neuropathy or foot ulcers.    Diabetes Care  Retinopathy: none per patient; reminded him to see Oph annually.  Nephropathy:hx of ESRD s/p kidney transplant in 12/2019.  Neuropathy: none per patient.  Foot Exam: no exam today.  Taking aspirin: yes.  Lipids: LDL 35 in 6/2022. Pt taking Lipitor.  Insulin: none.  DM meds: Metformin.  Testing: glucose meter.                ROS  See under HPI.      Allergies  Allergies   Allergen Reactions     Norco [Hydrocodone-Acetaminophen] Nausea and Vomiting     Cats      Throat tightness     Isosorbide Other (See Comments)     hypotension     Penicillins Hives     Seasonal Allergies      rhinitis     Shrimp      Throat closes         Medications  Current Outpatient Medications   Medication Sig Dispense Refill     amLODIPine (NORVASC) 5 MG tablet Take 1 tablet (5 mg) by mouth daily 90 tablet 3     aspirin 81 MG EC tablet Take 81 mg by mouth every evening       atorvastatin (LIPITOR) 40 MG tablet Take 1 tablet (40 mg) by mouth every evening 90 day supply if able 90 tablet 3     biotin 1000 MCG TABS tablet Take 1,000 mcg by mouth every morning Patient takes every other day       blood glucose (ACCU-CHEK GUIDE) test strip Use to test blood sugar 3 times daily or as directed. 300 strip 3     blood glucose monitoring (ACCU-CHEK FASTCLIX) lancets USE TO TEST 3 TIMES DAILY OR AS DIRECTED. 300 each 3     calcium citrate and vitamin D (CITRACAL) 200-250 MG-UNIT TABS per tablet Take 1 tablet by mouth 2 times daily       Calcium Polycarbophil (FIBER) 625 MG tablet Take by mouth every morning       co-enzyme Q-10 30 MG CAPS capsule Take by mouth every morning       fluticasone (FLONASE) 50 MCG/ACT nasal spray SHAKE LIQUID AND USE 1 SPRAY IN EACH NOSTRIL DAILY (Patient taking differently: every evening SHAKE LIQUID AND USE 1 SPRAY IN EACH NOSTRIL DAILY) 16 g 11     loratadine (CLARITIN) 10 MG tablet Take 10 mg by mouth every evening Patient takes at bedtime       magnesium 250 MG tablet Take 1 tablet by mouth every morning       metFORMIN (GLUCOPHAGE XR) 500 MG 24 hr tablet Take 4 tablets (2,000 mg) by mouth daily (with breakfast) Please call  to schedule a follow up visit. 360 tablet 0     Misc Natural Products (TART CHERRY ADVANCED PO) Take by mouth every morning       Multiple Vitamins-Minerals (HAIR SKIN NAILS PO) Take by mouth every morning       multivitamin (CENTRUM SILVER) tablet Take 1 tablet by mouth every morning       mycophenolate (GENERIC EQUIVALENT) 250 MG capsule Take 3 capsules (750 mg) by mouth 2 times daily 180 capsule 11     nystatin (MYCOSTATIN) 867149 UNIT/ML suspension Take 5 mLs (500,000 Units) by mouth 4 times daily  90 mL 0     OMEPRAZOLE PO Take 20 mg by mouth every evening       sulfamethoxazole-trimethoprim (BACTRIM) 400-80 MG tablet Take 1 tablet by mouth 2 times daily       sulfamethoxazole-trimethoprim (BACTRIM) 400-80 MG tablet Take 1 tablet by mouth daily (Patient taking differently: Take 1 tablet by mouth every morning) 30 tablet 11     tacrolimus (GENERIC EQUIVALENT) 0.5 MG capsule Take 1 capsule (0.5 mg) by mouth every evening 90 capsule 3     tacrolimus (GENERIC EQUIVALENT) 1 MG capsule TAKE 1 CAPSULE BY MOUTH EVERY MORNING. 90 capsule 3       Family History  family history includes C.A.D. in his father; Cerebrovascular Disease in his father; Diabetes in his father; Hypertension in his father.    Social History   reports that he quit smoking about 28 years ago. His smoking use included cigarettes. He started smoking about 38 years ago. He has a 20.00 pack-year smoking history. He has never used smokeless tobacco. He reports current alcohol use. He reports that he does not use drugs.     Past Medical History  Past Medical History:   Diagnosis Date     (HFpEF) heart failure with preserved ejection fraction (H)      Allergic rhinitis, cause unspecified      Anemia of chronic kidney failure      Aortic stenosis 08/13/2008    Overview:  Bicuspid aortic valve     AS (aortic stenosis)      Ascending aortic aneurysm      Bicuspid aortic valve      Bilateral hand pain 06/01/2021     CAD (coronary artery disease)      Congestive heart failure, unspecified      Coronary artery disease involving native artery of transplanted heart without angina pectoris 07/10/2014     Dialysis patient (H)     Tues-Thur-Sat     Dyslipidemia      Esophageal reflux      ESRD (end stage renal disease) (H)      Hearing problem      Heart replaced by transplant (H) 12/10/2018     Hypersomnia with sleep apnea, unspecified      Hypertension      Ileostomy status (H)      Immunosuppression (H)      MGUS (monoclonal gammopathy of unknown significance)       Mitral regurgitation      SHEELA (obstructive sleep apnea)     No CPAP     Pneumonia      Sigmoid diverticulitis 08/14/2018     Status post coronary angiogram 06/28/2019     Systolic heart failure (H)      Type 2 diabetes mellitus (H)      Ventral hernia without obstruction or gangrene        Past Surgical History:   Procedure Laterality Date     BIOPSY       CARDIAC SURGERY       COLONOSCOPY N/A 5/3/2018    Procedure: COLONOSCOPY;  colonoscopy ;  Surgeon: Ammon Castillo MD;  [facility]     CREATE FISTULA ARTERIOVENOUS UPPER EXTREMITY BOVINE Left 5/8/2019    Procedure: Left Upper Extremity Arteriovenous Bovine Graft Creation;  Surgeon: Calin Cheney MD;  [facility]     CV CORONARY ANGIOGRAM N/A 6/28/2019    Procedure: CV CORONARY ANGIOGRAM;  Surgeon: Montrell Posada MD;  [facility]     CV CORONARY ANGIOGRAM N/A 7/15/2020    Procedure: CV CORONARY ANGIOGRAM;  Surgeon: Marcelo Ramírez MD;  [facility]     CV CORONARY ANGIOGRAM N/A 6/7/2021    Procedure: CV CORONARY ANGIOGRAM;  Surgeon: Gilberot Mccann MD;  [facility]     CV CORONARY ANGIOGRAM N/A 6/13/2022    Procedure: Coronary Angiogram;  Surgeon: Marcelo Ramírez MD;  [facility]     CV HEART BIOPSY N/A 2/1/2019    Procedure: HBX;  Surgeon: Montrell Posada MD;  [facility]     CV HEART BIOPSY N/A 3/22/2019    Procedure: HBX, RIJV ACCESS;  Surgeon: Jordan Fox MD;  [facility]     CV HEART BIOPSY N/A 6/28/2019    Procedure: CV HEART BIOPSY;  Surgeon: Montrell Posada MD;  [facility]     CV HEART BIOPSY N/A 10/28/2019    Procedure: CV HEART BIOPSY;  Surgeon: Marcelo Ramírez MD;  Location:  [facility]     CV HEART BIOPSY N/A 2/6/2020    Procedure: CV HEART BIOPSY;  Surgeon: Montrell Posada MD;  [facility]     CV HEART BIOPSY N/A 7/15/2020    Procedure: CV HEART BIOPSY;  Surgeon: Marcelo Ramírez MD;  Location:  [facility]     CV RIGHT HEART CATH MEASUREMENTS RECORDED N/A 6/28/2019    Procedure: CV RIGHT HEART CATH;  Surgeon: Montrell Posada MD;  Location:  [facility]     CV RIGHT HEART CATH MEASUREMENTS RECORDED N/A 7/15/2020    Procedure: CV RIGHT HEART CATH;  Surgeon: Marcelo Ramírez MD;  Location:  [facility]     CV RIGHT HEART CATH MEASUREMENTS RECORDED N/A 6/7/2021    Procedure: CV RIGHT HEART CATH;  Surgeon: Gilberto Mccann MD;  Location:  [facility]     CV RIGHT HEART CATH MEASUREMENTS RECORDED N/A 6/13/2022    Procedure: Right Heart Catheterization;  Surgeon: Marcelo Ramírez MD;  Location:  [facility]     ENDOSCOPIC ULTRASOUND UPPER GASTROINTESTINAL TRACT (GI) N/A 11/8/2021    Procedure: ENDOSCOPIC ULTRASOUND, ESOPHAGOSCOPY / UPPER GASTROINTESTINAL TRACT (GI)  EUS with FNA;  Surgeon: Alon Don MD;  Location[facility]     ESOPHAGOSCOPY, GASTROSCOPY, DUODENOSCOPY (EGD), COMBINED N/A 5/7/2018    Procedure: COMBINED ENDOSCOPIC ULTRASOUND, ESOPHAGOSCOPY, GASTROSCOPY, DUODENOSCOPY (EGD), FINE NEEDLE ASPIRATE/BIOPSY;  Endoscopic Ultrasound with Fine Needle Aspiration ;  Surgeon: Alon Don MD;  [facility]     EXAM UNDER ANESTHESIA RECTUM N/A 8/12/2018    Procedure: EXAM UNDER ANESTHESIA RECTUM;  EXAM UNDER ANESTHESIA RECTUM ,COMBINED INCISION AND DRAINAGE OF RECTAL ABCESS ;  Surgeon: Rick Tran MD;  [facility]     HERNIORRHAPHY VENTRAL N/A 6/24/2022    Procedure: open mesh repair, incisional ventral hernia;  Surgeon: Shaheed Curtis MD;  [facility]     INCISION AND DRAINAGE RECTUM, COMBINED N/A 8/12/2018    Procedure: COMBINED INCISION AND DRAINAGE RECTUM;;  Surgeon: Rick Tran MD;  [facility]     IR DIALYSIS FISTULOGRAM LEFT   9/25/2019     IR DIALYSIS FISTULOGRAM LEFT  11/22/2019     IR DIALYSIS PTA  9/25/2019     IR DIALYSIS PTA  11/22/2019     LAPAROSCOPIC ASSISTED COLOSTOMY TAKEDOWN N/A 12/11/2018    Procedure: Laparoscopic Assisted Colostomy Takedown, Laparoscopic Lysis of Adhesions;  Surgeon: Rick Tran MD;  [facility]     LAPAROSCOPIC ASSISTED SIGMOID COLECTOMY N/A 8/14/2018    Procedure: LAPAROSCOPIC ASSISTED SIGMOID COLECTOMY;  Laparoscopic Hand Assisted Takedown of Splenic Flexure, Sigmoidectomy, Small Bowel Resection, Takedown of Small Bowel to Colon Fistula;  Surgeon: Rick Tran MD;  [facility]     LAPAROSCOPIC HERNIORRHAPHY INGUINAL BILATERAL Bilateral 7/24/2015    Procedure: LAPAROSCOPIC HERNIORRHAPHY INGUINAL BILATERAL;  Surgeon: Bobby Mcconnell MD;  [facility]     LAPAROSCOPIC INSERTION CATHETER PERITONEAL DIALYSIS N/A 6/22/2017    Procedure: LAPAROSCOPIC INSERTION CATHETER PERITONEAL DIALYSIS;  Laparoscopic Peritoneal Dialysis Catheter Placement - Anesthesia with block;  Surgeon: Esteban Arvizu MD;  [facility]     PICC INSERTION Left 04/22/2018    5Fr - 49cm (3cm external), Basilic vein, low SVC     REMOVE CATHETER PERITONEAL Right 1/15/2018    Procedure: REMOVE CATHETER PERITONEAL;  Open Removal of Peritoneal Dialysis Catheter ;  Surgeon: Esteban Arvizu MD;  [facility]     SIGMOIDOSCOPY FLEXIBLE N/A 11/21/2018    Procedure: Examination Under Anesthesia, Flexible Sigmoidoscopy and Polypectomy;  Surgeon: Rick Tran MD;  [facility]     SIGMOIDOSCOPY FLEXIBLE N/A 12/11/2018    Procedure: Flexible Sigmoidoscopy;  Surgeon: Rick Tran MD;  [facility]     TAKEDOWN ILEOSTOMY N/A 3/27/2019    Procedure: Takedown Ileostomy;  Surgeon: Rick Tran MD;  [facility]     TRANSPLANT HEART RECIPIENT N/A 6/14/2018    Procedure: TRANSPLANT HEART RECIPIENT;  Median Sternotomy, on-pump oxygenator, Heart Transplant;   Surgeon: Rony Caputo MD;  [facility]     TRANSPLANT KIDNEY RECIPIENT LIVING UNRELATED  12/2019       Physical Exam    No exam today.    RESULTS  Creatinine   Date Value Ref Range Status   10/11/2022 1.00 0.67 - 1.17 mg/dL Final   07/06/2021 1.09 0.66 - 1.25 mg/dL Final     GFR Estimate   Date Value Ref Range Status   10/11/2022 82 >60 mL/min/1.73m2 Final     Comment:     Effective December 21, 2021 eGFRcr in adults is calculated using the 2021 CKD-EPI creatinine equation which includes age and gender (Carmita et al., NEJ, DOI: 10.1056/ACNLik7263333)   07/06/2021 70 >60 mL/min/[1.73_m2] Final     Comment:     Non  GFR Calc  Starting 12/18/2018, serum creatinine based estimated GFR (eGFR) will be   calculated using the Chronic Kidney Disease Epidemiology Collaboration   (CKD-EPI) equation.       Hemoglobin A1C   Date Value Ref Range Status   06/13/2022 7.0 (H) 0.0 - 5.6 % Final     Comment:     Normal <5.7%   Prediabetes 5.7-6.4%    Diabetes 6.5% or higher     Note: Adopted from ADA consensus guidelines.   06/07/2021 6.2 (H) 0 - 5.6 % Final     Comment:     Normal <5.7% Prediabetes 5.7-6.4%  Diabetes 6.5% or higher - adopted from ADA   consensus guidelines.       Potassium   Date Value Ref Range Status   10/11/2022 4.3 3.4 - 5.3 mmol/L Final   08/09/2022 3.7 3.4 - 5.3 mmol/L Final   07/06/2021 3.9 3.4 - 5.3 mmol/L Final     ALT   Date Value Ref Range Status   06/13/2022 28 0 - 70 U/L Final   06/07/2021 28 0 - 70 U/L Final     AST   Date Value Ref Range Status   06/13/2022 24 0 - 45 U/L Final   06/07/2021 27 0 - 45 U/L Final     TSH   Date Value Ref Range Status   09/07/2021 1.89 0.40 - 4.00 mU/L Final   04/16/2018 2.25 0.40 - 4.00 mU/L Final       Cholesterol   Date Value Ref Range Status   06/13/2022 114 <200 mg/dL Final   06/07/2021 120 <200 mg/dL Final   12/09/2020 116 <200 mg/dL Final     HDL Cholesterol   Date Value Ref Range Status   06/07/2021 49 >39 mg/dL Final   12/09/2020 47  >39 mg/dL Final     Direct Measure HDL   Date Value Ref Range Status   06/13/2022 45 >=40 mg/dL Final     LDL Cholesterol Calculated   Date Value Ref Range Status   06/13/2022 35 <=100 mg/dL Final   06/07/2021 28 <100 mg/dL Final     Comment:     Desirable:       <100 mg/dl   12/09/2020 26 <100 mg/dL Final     Comment:     Desirable:       <100 mg/dl     Triglycerides   Date Value Ref Range Status   06/13/2022 170 (H) <150 mg/dL Final   06/07/2021 215 (H) <150 mg/dL Final     Comment:     Borderline high:  150-199 mg/dl  High:             200-499 mg/dl  Very high:       >499 mg/dl     12/09/2020 214 (H) <150 mg/dL Final     Comment:     Borderline high:  150-199 mg/dl  High:             200-499 mg/dl  Very high:       >499 mg/dl       Cholesterol/HDL Ratio   Date Value Ref Range Status   08/10/2015 5.0 0.0 - 5.0 Final   04/09/2015 4.0 0.0 - 5.0 Final         ASSESSMENT/PLAN:    1.  TYPE 2 DIABETES MELLITUS: Patient's blood sugar values have improved over the past 5 days. He tells me he is eating healthier, smaller portions and less sweets.  He would like to continue to work on his diet before adding another diabetic medication.  I discuss use of a GLP-1 or Jardiance.  He is to work making healthy food choices, weight loss and exercise.  Will plan to check A1C in Jan 2023.  Reminded pt to see Oph for annual diabetic eye exam.  No sx of neuropathy and he denies foot ulcers.  No vitals today.    2.  HX OF ESRD: S/P kidney transplant in Dec 2019.  Most recent creat 1.00 with GFR 82 mL/min in 10/2022.  Urine protein negative in 8/2022.    3.  CARDIAC: S/P heart transplant in 6/2018.    4.  LIPIDS: LDL 35 in June 2022.  Pt taking Lipitor.    5.  FOLLOW UP: With me in 3 months.  A1C ordered today - to be done in Jan 2023.    Time spent reviewing chart, labs and glucose data today = 8 minutes.  Time for video visit today = 17 minutes.  Time for documentation today = 15 minutes.    Total time for visit today = 40  minutes.    Rosa Calvert PA-C

## 2022-11-14 ENCOUNTER — PRE VISIT (OUTPATIENT)
Dept: ORTHOPEDICS | Facility: CLINIC | Age: 67
End: 2022-11-14

## 2022-11-14 ENCOUNTER — OFFICE VISIT (OUTPATIENT)
Dept: ORTHOPEDICS | Facility: CLINIC | Age: 67
End: 2022-11-14
Payer: MEDICARE

## 2022-11-14 DIAGNOSIS — M77.12 LATERAL EPICONDYLITIS OF LEFT ELBOW: Primary | ICD-10-CM

## 2022-11-14 DIAGNOSIS — Z94.1 HEART REPLACED BY TRANSPLANT (H): ICD-10-CM

## 2022-11-14 DIAGNOSIS — Z94.0 KIDNEY REPLACED BY TRANSPLANT: ICD-10-CM

## 2022-11-14 PROCEDURE — 99203 OFFICE O/P NEW LOW 30 MIN: CPT | Performed by: FAMILY MEDICINE
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
## 2022-11-15 ENCOUNTER — LAB (OUTPATIENT)
Dept: LAB | Facility: CLINIC | Age: 67
End: 2022-11-15
Payer: MEDICARE

## 2022-11-15 DIAGNOSIS — Z94.0 KIDNEY REPLACED BY TRANSPLANT: ICD-10-CM

## 2022-11-15 LAB
ANION GAP SERPL CALCULATED.3IONS-SCNC: 9 MMOL/L (ref 7–15)
BUN SERPL-MCNC: 14.3 MG/DL (ref 8–23)
CALCIUM SERPL-MCNC: 10.2 MG/DL (ref 8.8–10.2)
CHLORIDE SERPL-SCNC: 102 MMOL/L (ref 98–107)
CREAT SERPL-MCNC: 1.08 MG/DL (ref 0.67–1.17)
DEPRECATED HCO3 PLAS-SCNC: 28 MMOL/L (ref 22–29)
ERYTHROCYTE [DISTWIDTH] IN BLOOD BY AUTOMATED COUNT: 13 % (ref 10–15)
GFR SERPL CREATININE-BSD FRML MDRD: 75 ML/MIN/1.73M2
GLUCOSE SERPL-MCNC: 217 MG/DL (ref 70–99)
HCT VFR BLD AUTO: 39.4 % (ref 40–53)
HGB BLD-MCNC: 12.6 G/DL (ref 13.3–17.7)
MCH RBC QN AUTO: 28.8 PG (ref 26.5–33)
MCHC RBC AUTO-ENTMCNC: 32 G/DL (ref 31.5–36.5)
MCV RBC AUTO: 90 FL (ref 78–100)
PLATELET # BLD AUTO: 222 10E3/UL (ref 150–450)
POTASSIUM SERPL-SCNC: 3.9 MMOL/L (ref 3.4–5.3)
RBC # BLD AUTO: 4.37 10E6/UL (ref 4.4–5.9)
SODIUM SERPL-SCNC: 139 MMOL/L (ref 136–145)
TACROLIMUS BLD-MCNC: 5.6 UG/L (ref 5–15)
TME LAST DOSE: NORMAL H
TME LAST DOSE: NORMAL H
WBC # BLD AUTO: 5.3 10E3/UL (ref 4–11)

## 2022-11-15 PROCEDURE — 85027 COMPLETE CBC AUTOMATED: CPT | Performed by: PATHOLOGY

## 2022-11-15 PROCEDURE — 80197 ASSAY OF TACROLIMUS: CPT | Performed by: INTERNAL MEDICINE

## 2022-11-15 PROCEDURE — 36415 COLL VENOUS BLD VENIPUNCTURE: CPT | Performed by: PATHOLOGY

## 2022-11-15 PROCEDURE — 80048 BASIC METABOLIC PNL TOTAL CA: CPT | Performed by: PATHOLOGY

## 2022-11-19 ENCOUNTER — HEALTH MAINTENANCE LETTER (OUTPATIENT)
Age: 67
End: 2022-11-19

## 2022-11-21 ENCOUNTER — INFUSION THERAPY VISIT (OUTPATIENT)
Dept: NURSING | Facility: CLINIC | Age: 67
End: 2022-11-21
Payer: MEDICARE

## 2022-11-21 ENCOUNTER — CARE COORDINATION (OUTPATIENT)
Dept: GASTROENTEROLOGY | Facility: CLINIC | Age: 67
End: 2022-11-21

## 2022-11-21 DIAGNOSIS — K86.2 PANCREATIC CYST: Primary | ICD-10-CM

## 2022-11-21 DIAGNOSIS — Z29.89 PROPHYLACTIC IMMUNOTHERAPY: Primary | ICD-10-CM

## 2022-11-21 PROCEDURE — M0220 PR INJECTION TIXAGEVIMAB & CILGAVIMAB (EVUSHELD): HCPCS | Performed by: FAMILY MEDICINE

## 2022-11-21 NOTE — PROGRESS NOTES
Dr. Don letter:  Therefore, this requires ongoing close surveillance, with an other MRI/MRCP in 6m. Our team will organize this study.        Pt due for for repeat ERCP April 2023

## 2022-11-21 NOTE — PROGRESS NOTES
EVUSHELD Administration Note:  Murray Nicholson presents today for EVUSHELD.   present during visit today: Not Applicable.    Consent:   Evusheld Consent   Confirmed patient received the Emergency Use Authorization Fact Sheet for Patients, Parents and Caregivers prior to receiving medication. We discussed the following risks and benefits of receiving EVUSHELD, as well as alternative treatments and the patient wished to proceed with EVUSHELD.     Providers believe the benefits of Evusheld outweigh the risks for all moderately to severely immunocompromised patients.      Benefits:   Evusheld is a synthetic antibody that provides protection against COVID-19 for 6 months and is recommended for patients that have a weakened immune system that may not be able to make antibodies themselves.     Risks:    There is a very small risk of allergic reactions and you should notify us if you have any symptoms of an allergic reaction after the injection. There were also some extremely rare cardiac events reported in the initial trials in people that already had heart disease, but experts do not think these were caused by the medication. We will observe you for any chest pain or trouble breathing after the injections and you can reach out to your provider if any of these symptoms develop.     Alternatives:   Vaccines to prevent COVID-19 are approved or available under Emergency Use Authorization. Use of EVUSHELD does not replace vaccination against COVID-19. You can continue to mask and isolate to avoid infections. It is your choice to receive or not receive EVUSHELD. Should you decide not to receive EVUSHELD, it will not change your standard medical care.     Patient does consent to the injection.        Post Injection Assessment:   Patient tolerated injection without incident.  Patient observed for 60 minutes post injection per protocol.      Patient was observed in the room for a minimum of 10 minutes after injection per  standard, then remained in the buidling for a total 60 minute observation period.         Discharge Plan:    Patient and/or family verbalized understanding of discharge instructions and all questions answered.  Patient discharged in stable condition accompanied by: self.     Naresh Guallpa RN BSN  [facility]

## 2022-12-09 ENCOUNTER — LAB (OUTPATIENT)
Dept: LAB | Facility: CLINIC | Age: 67
End: 2022-12-09
Payer: MEDICARE

## 2022-12-09 DIAGNOSIS — E21.3 HYPERPARATHYROIDISM, UNSPECIFIED (H): ICD-10-CM

## 2022-12-09 DIAGNOSIS — E10.29 DM (DIABETES MELLITUS) TYPE I CONTROLLED WITH RENAL MANIFESTATION (H): ICD-10-CM

## 2022-12-09 DIAGNOSIS — Z94.0 KIDNEY REPLACED BY TRANSPLANT: ICD-10-CM

## 2022-12-09 LAB
ANION GAP SERPL CALCULATED.3IONS-SCNC: 12 MMOL/L (ref 7–15)
BUN SERPL-MCNC: 17.4 MG/DL (ref 8–23)
CALCIUM SERPL-MCNC: 10.1 MG/DL (ref 8.8–10.2)
CHLORIDE SERPL-SCNC: 101 MMOL/L (ref 98–107)
CREAT SERPL-MCNC: 1.07 MG/DL (ref 0.67–1.17)
DEPRECATED HCO3 PLAS-SCNC: 24 MMOL/L (ref 22–29)
ERYTHROCYTE [DISTWIDTH] IN BLOOD BY AUTOMATED COUNT: 13.1 % (ref 10–15)
GFR SERPL CREATININE-BSD FRML MDRD: 76 ML/MIN/1.73M2
GLUCOSE SERPL-MCNC: 219 MG/DL (ref 70–99)
HCT VFR BLD AUTO: 39.9 % (ref 40–53)
HGB BLD-MCNC: 12.8 G/DL (ref 13.3–17.7)
MCH RBC QN AUTO: 28.8 PG (ref 26.5–33)
MCHC RBC AUTO-ENTMCNC: 32.1 G/DL (ref 31.5–36.5)
MCV RBC AUTO: 90 FL (ref 78–100)
PLATELET # BLD AUTO: 228 10E3/UL (ref 150–450)
POTASSIUM SERPL-SCNC: 4 MMOL/L (ref 3.4–5.3)
RBC # BLD AUTO: 4.45 10E6/UL (ref 4.4–5.9)
SODIUM SERPL-SCNC: 137 MMOL/L (ref 136–145)
TACROLIMUS BLD-MCNC: 4.6 UG/L (ref 5–15)
TME LAST DOSE: ABNORMAL H
TME LAST DOSE: ABNORMAL H
WBC # BLD AUTO: 4.5 10E3/UL (ref 4–11)

## 2022-12-09 PROCEDURE — 80197 ASSAY OF TACROLIMUS: CPT | Performed by: INTERNAL MEDICINE

## 2022-12-09 PROCEDURE — 36415 COLL VENOUS BLD VENIPUNCTURE: CPT | Performed by: PATHOLOGY

## 2022-12-09 PROCEDURE — 85027 COMPLETE CBC AUTOMATED: CPT | Performed by: PATHOLOGY

## 2022-12-09 PROCEDURE — 82306 VITAMIN D 25 HYDROXY: CPT | Performed by: INTERNAL MEDICINE

## 2022-12-09 PROCEDURE — 83735 ASSAY OF MAGNESIUM: CPT | Performed by: PATHOLOGY

## 2022-12-09 PROCEDURE — 80048 BASIC METABOLIC PNL TOTAL CA: CPT | Performed by: PATHOLOGY

## 2022-12-12 ENCOUNTER — VIRTUAL VISIT (OUTPATIENT)
Dept: NEPHROLOGY | Facility: CLINIC | Age: 67
End: 2022-12-12
Attending: INTERNAL MEDICINE
Payer: MEDICARE

## 2022-12-12 DIAGNOSIS — Z94.0 KIDNEY REPLACED BY TRANSPLANT: Primary | ICD-10-CM

## 2022-12-12 DIAGNOSIS — E55.9 VITAMIN D DEFICIENCY: ICD-10-CM

## 2022-12-12 DIAGNOSIS — I15.1 HTN, KIDNEY TRANSPLANT RELATED: ICD-10-CM

## 2022-12-12 DIAGNOSIS — Z94.0 HTN, KIDNEY TRANSPLANT RELATED: ICD-10-CM

## 2022-12-12 DIAGNOSIS — Z29.89 NEED FOR PNEUMOCYSTIS PROPHYLAXIS: ICD-10-CM

## 2022-12-12 DIAGNOSIS — D84.9 IMMUNOSUPPRESSION (H): ICD-10-CM

## 2022-12-12 DIAGNOSIS — Z48.298 AFTERCARE FOLLOWING ORGAN TRANSPLANT: ICD-10-CM

## 2022-12-12 DIAGNOSIS — D63.1 ANEMIA IN STAGE 2 CHRONIC KIDNEY DISEASE: ICD-10-CM

## 2022-12-12 DIAGNOSIS — N18.2 ANEMIA IN STAGE 2 CHRONIC KIDNEY DISEASE: ICD-10-CM

## 2022-12-12 DIAGNOSIS — Z94.1 HEART REPLACED BY TRANSPLANT (H): ICD-10-CM

## 2022-12-12 PROCEDURE — 99214 OFFICE O/P EST MOD 30 MIN: CPT | Mod: 95 | Performed by: INTERNAL MEDICINE

## 2022-12-12 NOTE — PATIENT INSTRUCTIONS
Patient Recommendations:  - Recommend routine follow up with Dr. Caraballo in 6 months or so to resume care with a goal of ongoing co-management between your primary Nephrologist and the Transplant Team.    Transplant Patient Information  Your Post Transplant Coordinator is: Colette Martin  Your Post Heart Transplant Coordinator is: Lillie Ulloa  For non urgent items, we encourage you to contact your coordinator/care team online via [employer]  You and your care team can also contact your transplant coordinator Monday - Friday, 8am - 5pm at [phone] (Option 2 to reach the coordinator or Option 4 to schedule an appointment).  After hours for urgent matters, please call [facility] at [phone].

## 2022-12-12 NOTE — PROGRESS NOTES
Murray is a 67 year old who is being evaluated via a billable video visit.      How would you like to obtain your AVS? MyChart  If the video visit is dropped, the invitation should be resent by: Text to cell phone: [phone]  Will anyone else be joining your video visit? No    Video-Visit Details    Video Start Time: 1411    Type of service:  Video Visit    Video End Time:1425    Originating Location (pt. Location): Home    Distant Location (provider location):  Off-site    Platform used for Video Visit: [facility]      TRANSPLANT NEPHROLOGY CHRONIC POST TRANSPLANT VISIT    Assessment & Plan   # LDKT: Stable   - Baseline Creatinine:  ~ 1.0-1.2   - Proteinuria: Normal (<0.2 grams)   - Date DSA Last Checked: Jun/2022      Latest DSA: No   - BK Viremia: No   - Kidney Tx Biopsy: No    # Heart Tx: Patient appears to be stable.  Followed by Cardiology.    # Immunosuppression: Tacrolimus immediate release (goal 4-6) and Mycophenolate mofetil (dose 750 mg every 12 hours)   - Continue with intensive monitoring of immunosuppression for efficacy and toxicity.   - Patient was on higher tacrolimus goal due to Heart Transplant guidelines, but now per Cardiology, okay for 4-6 goal.   - Changes: No    # Infection Prophylaxis:   - PJP: Sulfa/TMP (Bactrim)    # Hypertension: Controlled;  Goal BP: < 130/80   - Changes: No    # Diabetes: Borderline control (HbA1c 7-9%) Last HbA1c: 7.0%   - Management as per primary care.    # Anemia in Chronic Renal Disease: Hgb: Stable, near normal      ANASTASIYA: No   - Iron studies: Not checked recently    # Mineral Bone Disorder:   - Vitamin D; level: Not checked recently        On supplement: Yes  - Calcium; level: Normal        On supplement: Yes    # Electrolytes:   - Potassium; level: Normal        On supplement: No  - Magnesium; level: Not checked recently        On supplement: Yes  - Bicarbonate; level: Normal        On supplement: No    # PAD: No claudication symptoms.    # H/o CVA: No residual  "deficits.    # MGUS: Stable monoclonal IgG immunoglobulin of kappa light chain type with peak of ~ 0.3 with last check 11/2020.   - Will recheck SPEP yearly, next due 3/2023.    # GERD: Asymptomatic on PPI.    # Pancreatic Cyst: Likely side branch IPMN with negative biopsy, but some increase in cyst number.  GI recommended repeat MRI/MRCP 5/2023.    # Skin Cancer Risk:    - Discussed sun protection and recommend regular follow up with Dermatology.    # Medical Compliance: Yes    # COVID-19 Virus Review: Discussed COVID-19 virus and the potential medical risks.  Reviewed preventative health recommendations, including wearing a mask where appropriate.  Recommended COVID vaccination should be up to date with either an initial vaccination or booster shot when appropriate.  Asked the patient to inform the transplant center if they are exposed or diagnosed with this virus.    # COVID Vaccination Up To Date: Yes    # Transplant History:  Etiology of Kidney Failure: Diabetes mellitus type 2  Tx: LDKT and Heart Tx  Transplant: 12/19/2019 (Kidney), 6/14/2018 (Heart)  Significant changes in immunosuppression: None  Significant transplant-related complications: None    Transplant Office Phone Number: [phone]    Assessment and plan was discussed with the patient and he voiced his understanding and agreement.    Return visit: Return in about 1 year (around 12/12/2023).    Giovany Quijano MD    Chief Complaint   Mr. Nicholson is a 67 year old here for kidney transplant and immunosuppression management.    History of Present Illness    Mr. Nicholson reports feeling good overall with some medical complaints.  Since last clinic visit, patient reports no hospitalizations or new medical complaints and has been doing well overall.  Patient did undergo a hernia repair in June and is all healed from that surgery.  His energy level has been \"great\" and remains normal.  He is active and does get some exercise.  Denies any chest pain " or shortness of breath with exertion.  No claudication symptoms.  No leg swelling.    Appetite is good and weight is stable.  No nausea, vomiting or diarrhea.  No heartburn symptoms on omeprazole.  No fever, sweats or chills.  No night sweats.    Home BP: 110-120/70-80s with rare lightheadedness.    Problem List   Patient Active Problem List   Diagnosis     Esophageal reflux     Allergic rhinitis     Anemia in chronic renal disease     Dyslipidemia     MGUS (monoclonal gammopathy of unknown significance)     Ascending aortic aneurysm     Heart replaced by transplant (H)     Immunosuppression (H)     Type 2 diabetes mellitus with stage 4 chronic kidney disease, without long-term current use of insulin (H)     Pancreatic cyst     Kidney replaced by transplant     Aftercare following organ transplant     HTN, kidney transplant related     Secondary renal hyperparathyroidism (H)     Vitamin D deficiency     Need for pneumocystis prophylaxis     Erectile dysfunction, unspecified erectile dysfunction type     Nocturia     History of hernia repair     Decreased hearing, unspecified laterality     Status post coronary angiogram       Allergies   Allergies   Allergen Reactions     Norco [Hydrocodone-Acetaminophen] Nausea and Vomiting     Cats      Throat tightness     Isosorbide Other (See Comments)     hypotension     Penicillins Hives     Seasonal Allergies      rhinitis     Shrimp      Throat closes        Medications   Current Outpatient Medications   Medication Sig     amLODIPine (NORVASC) 5 MG tablet Take 1 tablet (5 mg) by mouth daily     aspirin 81 MG EC tablet Take 81 mg by mouth every evening     atorvastatin (LIPITOR) 40 MG tablet Take 1 tablet (40 mg) by mouth every evening 90 day supply if able     biotin 1000 MCG TABS tablet Take 1,000 mcg by mouth every morning Patient takes every other day     blood glucose (ACCU-CHEK GUIDE) test strip Use to test blood sugar 3 times daily or as directed.     blood glucose  monitoring (ACCU-CHEK FASTCLIX) lancets USE TO TEST 3 TIMES DAILY OR AS DIRECTED.     calcium citrate and vitamin D (CITRACAL) 200-250 MG-UNIT TABS per tablet Take 1 tablet by mouth 2 times daily     Calcium Polycarbophil (FIBER) 625 MG tablet Take by mouth every morning     co-enzyme Q-10 30 MG CAPS capsule Take by mouth every morning     fluticasone (FLONASE) 50 MCG/ACT nasal spray SHAKE LIQUID AND USE 1 SPRAY IN EACH NOSTRIL DAILY (Patient taking differently: every evening SHAKE LIQUID AND USE 1 SPRAY IN EACH NOSTRIL DAILY)     loratadine (CLARITIN) 10 MG tablet Take 10 mg by mouth every evening Patient takes at bedtime     magnesium 250 MG tablet Take 1 tablet by mouth every morning     metFORMIN (GLUCOPHAGE XR) 500 MG 24 hr tablet Take 4 tabs every AM     Misc Natural Products (TART CHERRY ADVANCED PO) Take by mouth every morning     Multiple Vitamins-Minerals (HAIR SKIN NAILS PO) Take by mouth every morning     multivitamin (CENTRUM SILVER) tablet Take 1 tablet by mouth every morning     mycophenolate (GENERIC EQUIVALENT) 250 MG capsule Take 3 capsules (750 mg) by mouth 2 times daily     nystatin (MYCOSTATIN) 381176 UNIT/ML suspension Take 5 mLs (500,000 Units) by mouth 4 times daily     OMEPRAZOLE PO Take 20 mg by mouth every evening     sulfamethoxazole-trimethoprim (BACTRIM) 400-80 MG tablet Take 1 tablet by mouth 2 times daily     sulfamethoxazole-trimethoprim (BACTRIM) 400-80 MG tablet Take 1 tablet by mouth daily (Patient taking differently: Take 1 tablet by mouth every morning)     tacrolimus (GENERIC EQUIVALENT) 0.5 MG capsule Take 1 capsule (0.5 mg) by mouth every evening     tacrolimus (GENERIC EQUIVALENT) 1 MG capsule TAKE 1 CAPSULE BY MOUTH EVERY MORNING.     Current Facility-Administered Medications   Medication     cilgavimab 300 mg/tixagevimab 300 mg (BUSTER) - FULL DOSE     Facility-Administered Medications Ordered in Other Visits   Medication     diatrizoate meglumine-sodium  (GASTROGRAFIN/GASTROVIEW) 66-10 % solution 480 mL     There are no discontinued medications.    Physical Exam   Vital Signs: There were no vitals taken for this visit.    GENERAL APPEARANCE: alert and no distress  HENT: no obvious abnormalities on appearance  RESP: breathing appears unremarkable with normal rate, no audible wheezing or cough and no apparent shortness of breath with conversation  MS: extremities normal - no gross deformities noted  SKIN: no apparent rash and normal skin tone  NEURO: speech is clear with no obvious neurological deficits  PSYCH: mentation appears normal and affect normal    Data     Renal Latest Ref Rng & Units 12/9/2022 11/15/2022 10/11/2022   Na 136 - 145 mmol/L 137 139 139   K 3.4 - 5.3 mmol/L 4.0 3.9 4.3   Cl 98 - 107 mmol/L 101 102 105   CO2 22 - 29 mmol/L 24 28 25   BUN 8.0 - 23.0 mg/dL 17.4 14.3 19.2   Cr 0.67 - 1.17 mg/dL 1.07 1.08 1.00   Glucose 70 - 99 mg/dL 219(H) 217(H) 183(H)   Ca  8.8 - 10.2 mg/dL 10.1 10.2 9.8   Mg 1.7 - 2.3 mg/dL 1.3(L) - -     Bone Health Latest Ref Rng & Units 12/9/2022 6/13/2022 10/12/2021   Phos 2.5 - 4.5 mg/dL - 3.2 2.7   PTHi 18 - 80 pg/mL - - -   Vit D Def 20 - 75 ug/L 53 - -     Heme Latest Ref Rng & Units 12/9/2022 11/15/2022 10/11/2022   WBC 4.0 - 11.0 10e3/uL 4.5 5.3 4.3   Hgb 13.3 - 17.7 g/dL 12.8(L) 12.6(L) 12.9(L)   Plt 150 - 450 10e3/uL 228 222 198   ABSOLUTE NEUTROPHIL 1.6 - 8.3 10e9/L - - -   ABSOLUTE LYMPHOCYTES 0.8 - 5.3 10e9/L - - -   ABSOLUTE MONOCYTES 0.0 - 1.3 10e9/L - - -   ABSOLUTE EOSINOPHILS 0.0 - 0.7 10e9/L - - -   ABSOLUTE BASOPHILS 0.0 - 0.2 10e9/L - - -   ABS IMMATURE GRANULOCYTES 0 - 0.4 10e9/L - - -   ABSOLUTE NUCLEATED RBC - - - -     Liver Latest Ref Rng & Units 6/13/2022 6/7/2021 12/9/2020   AP 40 - 150 U/L 126 101 136   TBili 0.2 - 1.3 mg/dL 1.0 1.6(H) 1.1   DBili 0.0 - 0.2 mg/dL - - -   ALT 0 - 70 U/L 28 28 36   AST 0 - 45 U/L 24 27 22   Tot Protein 6.8 - 8.8 g/dL 7.4 7.3 7.3   Albumin 3.4 - 5.0 g/dL 3.8 3.8 3.8      Pancreas Latest Ref Rng & Units 6/13/2022 3/22/2022 1/11/2022   A1C 0.0 - 5.6 % 7.0(H) 6.7(H) 6.9(H)   Amylase 30 - 110 U/L - - -     Iron studies Latest Ref Rng & Units 12/10/2019 6/10/2019 7/10/2018   Iron 35 - 180 ug/dL 84 118 66   Iron sat 15 - 46 % 35 47(H) 28   Ferritin 26 - 388 ng/mL 445(H) 644(H) 771(H)     [facility] Txp Virology Latest Ref Rng & Units 6/13/2022 7/6/2021 6/10/2021   CVM DNA Quant - - - -   CMV QUANT IU/ML Not Detected IU/mL Not Detected - -   LOG IU/ML OF CMVQNT <2.1 [Log:IU]/mL - - -   BK Spec - - Plasma Plasma   BK Res BKNEG:BK Virus DNA Not Detected copies/mL - BK Virus DNA Not Detected BK Virus DNA Not Detected   BK Log <2.7 Log copies/mL - Not Calculated Not Calculated   EBV CAPSID ANTIBODY IGG 0.0 - 0.8 AI - - -   EBV DNA COPIES/ML Not Detected copies/mL Not Detected - -   EBV DNA LOG OF COPIES <2.7 [Log:copies]/mL - - -   Hep B Core NR:Nonreactive - - -        Recent Labs   Lab Test 10/11/22  0949 11/15/22  0952 12/09/22  1013   DOSTAC 10/10/2022 11/14/2022 12/8/2022   TACROL 5.6 5.6 4.6*     Recent Labs   Lab Test 12/26/19  0720 01/27/20  0918 02/03/20  0948   DOSMPA Not Provided NTP 02/02/20 0915pm   MPACID 1.65 2.39 2.41   MPAG 58.9 54.6 50.4

## 2022-12-12 NOTE — LETTER
12/12/2022       RE: Murray Nicholson  [address]  [place] [address]     Dear Colleague,    Thank you for referring your patient, Murray Nicholson, to the [facility] [place] at [facility]. Please see a copy of my visit note below.    Murray is a 67 year old who is being evaluated via a billable video visit.      How would you like to obtain your AVS? MyChart  If the video visit is dropped, the invitation should be resent by: Text to cell phone: [phone]  Will anyone else be joining your video visit? No    Video-Visit Details    Video Start Time: 1411    Type of service:  Video Visit    Video End Time:1425    Originating Location (pt. Location): Home    Distant Location (provider location):  Off-site    Platform used for Video Visit: [facility]      TRANSPLANT NEPHROLOGY CHRONIC POST TRANSPLANT VISIT    Assessment & Plan   # LDKT: Stable   - Baseline Creatinine:  ~ 1.0-1.2   - Proteinuria: Normal (<0.2 grams)   - Date DSA Last Checked: Jun/2022      Latest DSA: No   - BK Viremia: No   - Kidney Tx Biopsy: No    # Heart Tx: Patient appears to be stable.  Followed by Cardiology.    # Immunosuppression: Tacrolimus immediate release (goal 4-6) and Mycophenolate mofetil (dose 750 mg every 12 hours)   - Continue with intensive monitoring of immunosuppression for efficacy and toxicity.   - Patient was on higher tacrolimus goal due to Heart Transplant guidelines, but now per Cardiology, okay for 4-6 goal.   - Changes: No    # Infection Prophylaxis:   - PJP: Sulfa/TMP (Bactrim)    # Hypertension: Controlled;  Goal BP: < 130/80   - Changes: No    # Diabetes: Borderline control (HbA1c 7-9%) Last HbA1c: 7.0%   - Management as per primary care.    # Anemia in Chronic Renal Disease: Hgb: Stable, near normal      ANASTASIYA: No   - Iron studies: Not checked recently    # Mineral Bone Disorder:   - Vitamin D; level: Not checked recently        On  supplement: Yes  - Calcium; level: Normal        On supplement: Yes    # Electrolytes:   - Potassium; level: Normal        On supplement: No  - Magnesium; level: Not checked recently        On supplement: Yes  - Bicarbonate; level: Normal        On supplement: No    # PAD: No claudication symptoms.    # H/o CVA: No residual deficits.    # MGUS: Stable monoclonal IgG immunoglobulin of kappa light chain type with peak of ~ 0.3 with last check 11/2020.   - Will recheck SPEP yearly, next due 3/2023.    # GERD: Asymptomatic on PPI.    # Pancreatic Cyst: Likely side branch IPMN with negative biopsy, but some increase in cyst number.  GI recommended repeat MRI/MRCP 5/2023.    # Skin Cancer Risk:    - Discussed sun protection and recommend regular follow up with Dermatology.    # Medical Compliance: Yes    # COVID-19 Virus Review: Discussed COVID-19 virus and the potential medical risks.  Reviewed preventative health recommendations, including wearing a mask where appropriate.  Recommended COVID vaccination should be up to date with either an initial vaccination or booster shot when appropriate.  Asked the patient to inform the transplant center if they are exposed or diagnosed with this virus.    # COVID Vaccination Up To Date: Yes    # Transplant History:  Etiology of Kidney Failure: Diabetes mellitus type 2  Tx: LDKT and Heart Tx  Transplant: 12/19/2019 (Kidney), 6/14/2018 (Heart)  Significant changes in immunosuppression: None  Significant transplant-related complications: None    Transplant Office Phone Number: [phone]    Assessment and plan was discussed with the patient and he voiced his understanding and agreement.    Return visit: Return in about 1 year (around 12/12/2023).    Giovany Quijano MD    Chief Complaint   Mr. Nicholson is a 67 year old here for kidney transplant and immunosuppression management.    History of Present Illness    Mr. Nicholson reports feeling good overall with some medical  "complaints.  Since last clinic visit, patient reports no hospitalizations or new medical complaints and has been doing well overall.  Patient did undergo a hernia repair in June and is all healed from that surgery.  His energy level has been \"great\" and remains normal.  He is active and does get some exercise.  Denies any chest pain or shortness of breath with exertion.  No claudication symptoms.  No leg swelling.    Appetite is good and weight is stable.  No nausea, vomiting or diarrhea.  No heartburn symptoms on omeprazole.  No fever, sweats or chills.  No night sweats.    Home BP: 110-120/70-80s with rare lightheadedness.    Problem List   Patient Active Problem List   Diagnosis     Esophageal reflux     Allergic rhinitis     Anemia in chronic renal disease     Dyslipidemia     MGUS (monoclonal gammopathy of unknown significance)     Ascending aortic aneurysm     Heart replaced by transplant (H)     Immunosuppression (H)     Type 2 diabetes mellitus with stage 4 chronic kidney disease, without long-term current use of insulin (H)     Pancreatic cyst     Kidney replaced by transplant     Aftercare following organ transplant     HTN, kidney transplant related     Secondary renal hyperparathyroidism (H)     Vitamin D deficiency     Need for pneumocystis prophylaxis     Erectile dysfunction, unspecified erectile dysfunction type     Nocturia     History of hernia repair     Decreased hearing, unspecified laterality     Status post coronary angiogram       Allergies   Allergies   Allergen Reactions     Norco [Hydrocodone-Acetaminophen] Nausea and Vomiting     Cats      Throat tightness     Isosorbide Other (See Comments)     hypotension     Penicillins Hives     Seasonal Allergies      rhinitis     Shrimp      Throat closes        Medications   Current Outpatient Medications   Medication Sig     amLODIPine (NORVASC) 5 MG tablet Take 1 tablet (5 mg) by mouth daily     aspirin 81 MG EC tablet Take 81 mg by mouth every " evening     atorvastatin (LIPITOR) 40 MG tablet Take 1 tablet (40 mg) by mouth every evening 90 day supply if able     biotin 1000 MCG TABS tablet Take 1,000 mcg by mouth every morning Patient takes every other day     blood glucose (ACCU-CHEK GUIDE) test strip Use to test blood sugar 3 times daily or as directed.     blood glucose monitoring (ACCU-CHEK FASTCLIX) lancets USE TO TEST 3 TIMES DAILY OR AS DIRECTED.     calcium citrate and vitamin D (CITRACAL) 200-250 MG-UNIT TABS per tablet Take 1 tablet by mouth 2 times daily     Calcium Polycarbophil (FIBER) 625 MG tablet Take by mouth every morning     co-enzyme Q-10 30 MG CAPS capsule Take by mouth every morning     fluticasone (FLONASE) 50 MCG/ACT nasal spray SHAKE LIQUID AND USE 1 SPRAY IN EACH NOSTRIL DAILY (Patient taking differently: every evening SHAKE LIQUID AND USE 1 SPRAY IN EACH NOSTRIL DAILY)     loratadine (CLARITIN) 10 MG tablet Take 10 mg by mouth every evening Patient takes at bedtime     magnesium 250 MG tablet Take 1 tablet by mouth every morning     metFORMIN (GLUCOPHAGE XR) 500 MG 24 hr tablet Take 4 tabs every AM     Misc Natural Products (TART CHERRY ADVANCED PO) Take by mouth every morning     Multiple Vitamins-Minerals (HAIR SKIN NAILS PO) Take by mouth every morning     multivitamin (CENTRUM SILVER) tablet Take 1 tablet by mouth every morning     mycophenolate (GENERIC EQUIVALENT) 250 MG capsule Take 3 capsules (750 mg) by mouth 2 times daily     nystatin (MYCOSTATIN) 778030 UNIT/ML suspension Take 5 mLs (500,000 Units) by mouth 4 times daily     OMEPRAZOLE PO Take 20 mg by mouth every evening     sulfamethoxazole-trimethoprim (BACTRIM) 400-80 MG tablet Take 1 tablet by mouth 2 times daily     sulfamethoxazole-trimethoprim (BACTRIM) 400-80 MG tablet Take 1 tablet by mouth daily (Patient taking differently: Take 1 tablet by mouth every morning)     tacrolimus (GENERIC EQUIVALENT) 0.5 MG capsule Take 1 capsule (0.5 mg) by mouth every evening      tacrolimus (GENERIC EQUIVALENT) 1 MG capsule TAKE 1 CAPSULE BY MOUTH EVERY MORNING.     Current Facility-Administered Medications   Medication     cilgavimab 300 mg/tixagevimab 300 mg (EVUSHELD) - FULL DOSE     Facility-Administered Medications Ordered in Other Visits   Medication     diatrizoate meglumine-sodium (GASTROGRAFIN/GASTROVIEW) 66-10 % solution 480 mL     There are no discontinued medications.    Physical Exam   Vital Signs: There were no vitals taken for this visit.    GENERAL APPEARANCE: alert and no distress  HENT: no obvious abnormalities on appearance  RESP: breathing appears unremarkable with normal rate, no audible wheezing or cough and no apparent shortness of breath with conversation  MS: extremities normal - no gross deformities noted  SKIN: no apparent rash and normal skin tone  NEURO: speech is clear with no obvious neurological deficits  PSYCH: mentation appears normal and affect normal    Data     Renal Latest Ref Rng & Units 12/9/2022 11/15/2022 10/11/2022   Na 136 - 145 mmol/L 137 139 139   K 3.4 - 5.3 mmol/L 4.0 3.9 4.3   Cl 98 - 107 mmol/L 101 102 105   CO2 22 - 29 mmol/L 24 28 25   BUN 8.0 - 23.0 mg/dL 17.4 14.3 19.2   Cr 0.67 - 1.17 mg/dL 1.07 1.08 1.00   Glucose 70 - 99 mg/dL 219(H) 217(H) 183(H)   Ca  8.8 - 10.2 mg/dL 10.1 10.2 9.8   Mg 1.7 - 2.3 mg/dL 1.3(L) - -     Bone Health Latest Ref Rng & Units 12/9/2022 6/13/2022 10/12/2021   Phos 2.5 - 4.5 mg/dL - 3.2 2.7   PTHi 18 - 80 pg/mL - - -   Vit D Def 20 - 75 ug/L 53 - -     Heme Latest Ref Rng & Units 12/9/2022 11/15/2022 10/11/2022   WBC 4.0 - 11.0 10e3/uL 4.5 5.3 4.3   Hgb 13.3 - 17.7 g/dL 12.8(L) 12.6(L) 12.9(L)   Plt 150 - 450 10e3/uL 228 222 198   ABSOLUTE NEUTROPHIL 1.6 - 8.3 10e9/L - - -   ABSOLUTE LYMPHOCYTES 0.8 - 5.3 10e9/L - - -   ABSOLUTE MONOCYTES 0.0 - 1.3 10e9/L - - -   ABSOLUTE EOSINOPHILS 0.0 - 0.7 10e9/L - - -   ABSOLUTE BASOPHILS 0.0 - 0.2 10e9/L - - -   ABS IMMATURE GRANULOCYTES 0 - 0.4 10e9/L - - -    ABSOLUTE NUCLEATED RBC - - - -     Liver Latest Ref Rng & Units 6/13/2022 6/7/2021 12/9/2020   AP 40 - 150 U/L 126 101 136   TBili 0.2 - 1.3 mg/dL 1.0 1.6(H) 1.1   DBili 0.0 - 0.2 mg/dL - - -   ALT 0 - 70 U/L 28 28 36   AST 0 - 45 U/L 24 27 22   Tot Protein 6.8 - 8.8 g/dL 7.4 7.3 7.3   Albumin 3.4 - 5.0 g/dL 3.8 3.8 3.8     Pancreas Latest Ref Rng & Units 6/13/2022 3/22/2022 1/11/2022   A1C 0.0 - 5.6 % 7.0(H) 6.7(H) 6.9(H)   Amylase 30 - 110 U/L - - -     Iron studies Latest Ref Rng & Units 12/10/2019 6/10/2019 7/10/2018   Iron 35 - 180 ug/dL 84 118 66   Iron sat 15 - 46 % 35 47(H) 28   Ferritin 26 - 388 ng/mL 445(H) 644(H) 771(H)     [facility] Txp Virology Latest Ref Rng & Units 6/13/2022 7/6/2021 6/10/2021   CVM DNA Quant - - - -   CMV QUANT IU/ML Not Detected IU/mL Not Detected - -   LOG IU/ML OF CMVQNT <2.1 [Log:IU]/mL - - -   BK Spec - - Plasma Plasma   BK Res BKNEG:BK Virus DNA Not Detected copies/mL - BK Virus DNA Not Detected BK Virus DNA Not Detected   BK Log <2.7 Log copies/mL - Not Calculated Not Calculated   EBV CAPSID ANTIBODY IGG 0.0 - 0.8 AI - - -   EBV DNA COPIES/ML Not Detected copies/mL Not Detected - -   EBV DNA LOG OF COPIES <2.7 [Log:copies]/mL - - -   Hep B Core NR:Nonreactive - - -        Recent Labs   Lab Test 10/11/22  0949 11/15/22  0952 12/09/22  1013   DOSTAC 10/10/2022 11/14/2022 12/8/2022   TACROL 5.6 5.6 4.6*     Recent Labs   Lab Test 12/26/19  0720 01/27/20  0918 02/03/20  0948   DOSMPA Not Provided NTP 02/02/20 0915pm   MPACID 1.65 2.39 2.41   MPAG 58.9 54.6 50.4       Giovany Quijano MD

## 2022-12-12 NOTE — LETTER
12/12/2022      RE: Murray Nicholson  [address]  [place] [address]       Murray is a 67 year old who is being evaluated via a billable video visit.      How would you like to obtain your AVS? MyChart  If the video visit is dropped, the invitation should be resent by: Text to cell phone: [phone]  Will anyone else be joining your video visit? No    Video-Visit Details    Video Start Time: 1411    Type of service:  Video Visit    Video End Time:1425    Originating Location (pt. Location): Home    Distant Location (provider location):  Off-site    Platform used for Video Visit: [facility]      TRANSPLANT NEPHROLOGY CHRONIC POST TRANSPLANT VISIT    Assessment & Plan   # LDKT: Stable   - Baseline Creatinine:  ~ 1.0-1.2   - Proteinuria: Normal (<0.2 grams)   - Date DSA Last Checked: Jun/2022      Latest DSA: No   - BK Viremia: No   - Kidney Tx Biopsy: No    # Heart Tx: Patient appears to be stable.  Followed by Cardiology.    # Immunosuppression: Tacrolimus immediate release (goal 4-6) and Mycophenolate mofetil (dose 750 mg every 12 hours)   - Continue with intensive monitoring of immunosuppression for efficacy and toxicity.   - Patient was on higher tacrolimus goal due to Heart Transplant guidelines, but now per Cardiology, okay for 4-6 goal.   - Changes: No    # Infection Prophylaxis:   - PJP: Sulfa/TMP (Bactrim)    # Hypertension: Controlled;  Goal BP: < 130/80   - Changes: No    # Diabetes: Borderline control (HbA1c 7-9%) Last HbA1c: 7.0%   - Management as per primary care.    # Anemia in Chronic Renal Disease: Hgb: Stable, near normal      ANASTASIYA: No   - Iron studies: Not checked recently    # Mineral Bone Disorder:   - Vitamin D; level: Not checked recently        On supplement: Yes  - Calcium; level: Normal        On supplement: Yes    # Electrolytes:   - Potassium; level: Normal        On supplement: No  - Magnesium; level: Not checked recently        On supplement: Yes  - Bicarbonate; level: Normal    "     On supplement: No    # PAD: No claudication symptoms.    # H/o CVA: No residual deficits.    # MGUS: Stable monoclonal IgG immunoglobulin of kappa light chain type with peak of ~ 0.3 with last check 11/2020.   - Will recheck SPEP yearly, next due 3/2023.    # GERD: Asymptomatic on PPI.    # Pancreatic Cyst: Likely side branch IPMN with negative biopsy, but some increase in cyst number.  GI recommended repeat MRI/MRCP 5/2023.    # Skin Cancer Risk:    - Discussed sun protection and recommend regular follow up with Dermatology.    # Medical Compliance: Yes    # COVID-19 Virus Review: Discussed COVID-19 virus and the potential medical risks.  Reviewed preventative health recommendations, including wearing a mask where appropriate.  Recommended COVID vaccination should be up to date with either an initial vaccination or booster shot when appropriate.  Asked the patient to inform the transplant center if they are exposed or diagnosed with this virus.    # COVID Vaccination Up To Date: Yes    # Transplant History:  Etiology of Kidney Failure: Diabetes mellitus type 2  Tx: LDKT and Heart Tx  Transplant: 12/19/2019 (Kidney), 6/14/2018 (Heart)  Significant changes in immunosuppression: None  Significant transplant-related complications: None    Transplant Office Phone Number: [phone]    Assessment and plan was discussed with the patient and he voiced his understanding and agreement.    Return visit: Return in about 1 year (around 12/12/2023).    Giovany Quijano MD    Chief Complaint   Mr. Nicholson is a 67 year old here for kidney transplant and immunosuppression management.    History of Present Illness    Mr. Nicholson reports feeling good overall with some medical complaints.  Since last clinic visit, patient reports no hospitalizations or new medical complaints and has been doing well overall.  Patient did undergo a hernia repair in June and is all healed from that surgery.  His energy level has been \"great\" " and remains normal.  He is active and does get some exercise.  Denies any chest pain or shortness of breath with exertion.  No claudication symptoms.  No leg swelling.    Appetite is good and weight is stable.  No nausea, vomiting or diarrhea.  No heartburn symptoms on omeprazole.  No fever, sweats or chills.  No night sweats.    Home BP: 110-120/70-80s with rare lightheadedness.    Problem List   Patient Active Problem List   Diagnosis     Esophageal reflux     Allergic rhinitis     Anemia in chronic renal disease     Dyslipidemia     MGUS (monoclonal gammopathy of unknown significance)     Ascending aortic aneurysm     Heart replaced by transplant (H)     Immunosuppression (H)     Type 2 diabetes mellitus with stage 4 chronic kidney disease, without long-term current use of insulin (H)     Pancreatic cyst     Kidney replaced by transplant     Aftercare following organ transplant     HTN, kidney transplant related     Secondary renal hyperparathyroidism (H)     Vitamin D deficiency     Need for pneumocystis prophylaxis     Erectile dysfunction, unspecified erectile dysfunction type     Nocturia     History of hernia repair     Decreased hearing, unspecified laterality     Status post coronary angiogram       Allergies   Allergies   Allergen Reactions     Norco [Hydrocodone-Acetaminophen] Nausea and Vomiting     Cats      Throat tightness     Isosorbide Other (See Comments)     hypotension     Penicillins Hives     Seasonal Allergies      rhinitis     Shrimp      Throat closes        Medications   Current Outpatient Medications   Medication Sig     amLODIPine (NORVASC) 5 MG tablet Take 1 tablet (5 mg) by mouth daily     aspirin 81 MG EC tablet Take 81 mg by mouth every evening     atorvastatin (LIPITOR) 40 MG tablet Take 1 tablet (40 mg) by mouth every evening 90 day supply if able     biotin 1000 MCG TABS tablet Take 1,000 mcg by mouth every morning Patient takes every other day     blood glucose (ACCU-CHEK GUIDE)  test strip Use to test blood sugar 3 times daily or as directed.     blood glucose monitoring (ACCU-CHEK FASTCLIX) lancets USE TO TEST 3 TIMES DAILY OR AS DIRECTED.     calcium citrate and vitamin D (CITRACAL) 200-250 MG-UNIT TABS per tablet Take 1 tablet by mouth 2 times daily     Calcium Polycarbophil (FIBER) 625 MG tablet Take by mouth every morning     co-enzyme Q-10 30 MG CAPS capsule Take by mouth every morning     fluticasone (FLONASE) 50 MCG/ACT nasal spray SHAKE LIQUID AND USE 1 SPRAY IN EACH NOSTRIL DAILY (Patient taking differently: every evening SHAKE LIQUID AND USE 1 SPRAY IN EACH NOSTRIL DAILY)     loratadine (CLARITIN) 10 MG tablet Take 10 mg by mouth every evening Patient takes at bedtime     magnesium 250 MG tablet Take 1 tablet by mouth every morning     metFORMIN (GLUCOPHAGE XR) 500 MG 24 hr tablet Take 4 tabs every AM     Misc Natural Products (TART CHERRY ADVANCED PO) Take by mouth every morning     Multiple Vitamins-Minerals (HAIR SKIN NAILS PO) Take by mouth every morning     multivitamin (CENTRUM SILVER) tablet Take 1 tablet by mouth every morning     mycophenolate (GENERIC EQUIVALENT) 250 MG capsule Take 3 capsules (750 mg) by mouth 2 times daily     nystatin (MYCOSTATIN) 101982 UNIT/ML suspension Take 5 mLs (500,000 Units) by mouth 4 times daily     OMEPRAZOLE PO Take 20 mg by mouth every evening     sulfamethoxazole-trimethoprim (BACTRIM) 400-80 MG tablet Take 1 tablet by mouth 2 times daily     sulfamethoxazole-trimethoprim (BACTRIM) 400-80 MG tablet Take 1 tablet by mouth daily (Patient taking differently: Take 1 tablet by mouth every morning)     tacrolimus (GENERIC EQUIVALENT) 0.5 MG capsule Take 1 capsule (0.5 mg) by mouth every evening     tacrolimus (GENERIC EQUIVALENT) 1 MG capsule TAKE 1 CAPSULE BY MOUTH EVERY MORNING.     Current Facility-Administered Medications   Medication     cilgavimab 300 mg/tixagevimab 300 mg (BUSTER) - FULL DOSE     Facility-Administered Medications  Ordered in Other Visits   Medication     diatrizoate meglumine-sodium (GASTROGRAFIN/GASTROVIEW) 66-10 % solution 480 mL     There are no discontinued medications.    Physical Exam   Vital Signs: There were no vitals taken for this visit.    GENERAL APPEARANCE: alert and no distress  HENT: no obvious abnormalities on appearance  RESP: breathing appears unremarkable with normal rate, no audible wheezing or cough and no apparent shortness of breath with conversation  MS: extremities normal - no gross deformities noted  SKIN: no apparent rash and normal skin tone  NEURO: speech is clear with no obvious neurological deficits  PSYCH: mentation appears normal and affect normal    Data     Renal Latest Ref Rng & Units 12/9/2022 11/15/2022 10/11/2022   Na 136 - 145 mmol/L 137 139 139   K 3.4 - 5.3 mmol/L 4.0 3.9 4.3   Cl 98 - 107 mmol/L 101 102 105   CO2 22 - 29 mmol/L 24 28 25   BUN 8.0 - 23.0 mg/dL 17.4 14.3 19.2   Cr 0.67 - 1.17 mg/dL 1.07 1.08 1.00   Glucose 70 - 99 mg/dL 219(H) 217(H) 183(H)   Ca  8.8 - 10.2 mg/dL 10.1 10.2 9.8   Mg 1.7 - 2.3 mg/dL 1.3(L) - -     Bone Health Latest Ref Rng & Units 12/9/2022 6/13/2022 10/12/2021   Phos 2.5 - 4.5 mg/dL - 3.2 2.7   PTHi 18 - 80 pg/mL - - -   Vit D Def 20 - 75 ug/L 53 - -     Heme Latest Ref Rng & Units 12/9/2022 11/15/2022 10/11/2022   WBC 4.0 - 11.0 10e3/uL 4.5 5.3 4.3   Hgb 13.3 - 17.7 g/dL 12.8(L) 12.6(L) 12.9(L)   Plt 150 - 450 10e3/uL 228 222 198   ABSOLUTE NEUTROPHIL 1.6 - 8.3 10e9/L - - -   ABSOLUTE LYMPHOCYTES 0.8 - 5.3 10e9/L - - -   ABSOLUTE MONOCYTES 0.0 - 1.3 10e9/L - - -   ABSOLUTE EOSINOPHILS 0.0 - 0.7 10e9/L - - -   ABSOLUTE BASOPHILS 0.0 - 0.2 10e9/L - - -   ABS IMMATURE GRANULOCYTES 0 - 0.4 10e9/L - - -   ABSOLUTE NUCLEATED RBC - - - -     Liver Latest Ref Rng & Units 6/13/2022 6/7/2021 12/9/2020   AP 40 - 150 U/L 126 101 136   TBili 0.2 - 1.3 mg/dL 1.0 1.6(H) 1.1   DBili 0.0 - 0.2 mg/dL - - -   ALT 0 - 70 U/L 28 28 36   AST 0 - 45 U/L 24 27 22   Tot  Protein 6.8 - 8.8 g/dL 7.4 7.3 7.3   Albumin 3.4 - 5.0 g/dL 3.8 3.8 3.8     Pancreas Latest Ref Rng & Units 6/13/2022 3/22/2022 1/11/2022   A1C 0.0 - 5.6 % 7.0(H) 6.7(H) 6.9(H)   Amylase 30 - 110 U/L - - -     Iron studies Latest Ref Rng & Units 12/10/2019 6/10/2019 7/10/2018   Iron 35 - 180 ug/dL 84 118 66   Iron sat 15 - 46 % 35 47(H) 28   Ferritin 26 - 388 ng/mL 445(H) 644(H) 771(H)     [facility] Txp Virology Latest Ref Rng & Units 6/13/2022 7/6/2021 6/10/2021   CVM DNA Quant - - - -   CMV QUANT IU/ML Not Detected IU/mL Not Detected - -   LOG IU/ML OF CMVQNT <2.1 [Log:IU]/mL - - -   BK Spec - - Plasma Plasma   BK Res BKNEG:BK Virus DNA Not Detected copies/mL - BK Virus DNA Not Detected BK Virus DNA Not Detected   BK Log <2.7 Log copies/mL - Not Calculated Not Calculated   EBV CAPSID ANTIBODY IGG 0.0 - 0.8 AI - - -   EBV DNA COPIES/ML Not Detected copies/mL Not Detected - -   EBV DNA LOG OF COPIES <2.7 [Log:copies]/mL - - -   Hep B Core NR:Nonreactive - - -        Recent Labs   Lab Test 10/11/22  0949 11/15/22  0952 12/09/22  1013   DOSTAC 10/10/2022 11/14/2022 12/8/2022   TACROL 5.6 5.6 4.6*     Recent Labs   Lab Test 12/26/19  0720 01/27/20  0918 02/03/20  0948   DOSMPA Not Provided NTP 02/02/20 0915pm   MPACID 1.65 2.39 2.41   MPAG 58.9 54.6 50.4       Giovany Quijano MD

## 2022-12-13 ENCOUNTER — MYC MEDICAL ADVICE (OUTPATIENT)
Dept: TRANSPLANT | Facility: CLINIC | Age: 67
End: 2022-12-13

## 2022-12-13 DIAGNOSIS — E11.29 TYPE 2 DIABETES MELLITUS WITH OTHER DIABETIC KIDNEY COMPLICATION, WITHOUT LONG-TERM CURRENT USE OF INSULIN (H): ICD-10-CM

## 2022-12-13 DIAGNOSIS — E10.29 DM (DIABETES MELLITUS) TYPE I CONTROLLED WITH RENAL MANIFESTATION (H): ICD-10-CM

## 2022-12-13 DIAGNOSIS — Z94.0 KIDNEY REPLACED BY TRANSPLANT: Primary | ICD-10-CM

## 2022-12-13 DIAGNOSIS — E21.3 HYPERPARATHYROIDISM, UNSPECIFIED (H): ICD-10-CM

## 2022-12-14 LAB
DEPRECATED CALCIDIOL+CALCIFEROL SERPL-MC: 53 UG/L (ref 20–75)
MAGNESIUM SERPL-MCNC: 1.3 MG/DL (ref 1.7–2.3)

## 2022-12-14 NOTE — TELEPHONE ENCOUNTER
Giovany Quijano MD Sveiven, Sara, RN  Please check the following labs: Vitamin D and Magnesium.     Would also recommend repeating SPEP in March.     Jake

## 2022-12-15 RX ORDER — METFORMIN HCL 500 MG
TABLET, EXTENDED RELEASE 24 HR ORAL
Qty: 360 TABLET | Refills: 0 | Status: SHIPPED | OUTPATIENT
Start: 2022-12-15 | End: 2022-12-16

## 2022-12-15 NOTE — TELEPHONE ENCOUNTER
METFORMIN ER 500MG 24HR TABS     Last Written Prescription Date:  9/2/22  Last Fill Quantity: 360,   # refills: 0  Last Office Visit : 11/4/22  Future Office visit:  3/10/23    Routing refill request to provider for review/approval because:  Overdue A1c  Lab Test 06/13/22  0807   A1C 7.0*

## 2022-12-16 ENCOUNTER — MYC REFILL (OUTPATIENT)
Dept: ENDOCRINOLOGY | Facility: CLINIC | Age: 67
End: 2022-12-16

## 2022-12-16 DIAGNOSIS — E11.29 TYPE 2 DIABETES MELLITUS WITH OTHER DIABETIC KIDNEY COMPLICATION, WITHOUT LONG-TERM CURRENT USE OF INSULIN (H): ICD-10-CM

## 2022-12-16 RX ORDER — METFORMIN HCL 500 MG
TABLET, EXTENDED RELEASE 24 HR ORAL
Qty: 360 TABLET | Refills: 0 | Status: SHIPPED | OUTPATIENT
Start: 2022-12-16 | End: 2023-02-10

## 2022-12-18 ENCOUNTER — HEALTH MAINTENANCE LETTER (OUTPATIENT)
Age: 67
End: 2022-12-18

## 2022-12-21 ENCOUNTER — OFFICE VISIT (OUTPATIENT)
Dept: URGENT CARE | Facility: URGENT CARE | Age: 67
End: 2022-12-21
Payer: MEDICARE

## 2022-12-21 VITALS
SYSTOLIC BLOOD PRESSURE: 138 MMHG | WEIGHT: 177 LBS | HEART RATE: 108 BPM | OXYGEN SATURATION: 100 % | TEMPERATURE: 99 F | BODY MASS INDEX: 26.33 KG/M2 | RESPIRATION RATE: 18 BRPM | DIASTOLIC BLOOD PRESSURE: 87 MMHG

## 2022-12-21 DIAGNOSIS — J02.9 SORE THROAT: Primary | ICD-10-CM

## 2022-12-21 LAB — DEPRECATED S PYO AG THROAT QL EIA: NEGATIVE

## 2022-12-21 PROCEDURE — 87651 STREP A DNA AMP PROBE: CPT | Performed by: FAMILY MEDICINE

## 2022-12-21 PROCEDURE — 99213 OFFICE O/P EST LOW 20 MIN: CPT | Performed by: FAMILY MEDICINE

## 2022-12-21 NOTE — PROGRESS NOTES
Chief Complaint   Patient presents with     Urgent Care     X2 months ago, thrush in mouth and throat. Yesterday, similar symptoms from x2 months ago.      Murray was seen today for urgent care.    Diagnoses and all orders for this visit:    Sore throat  -     Streptococcus A Rapid Screen w/Reflex to PCR - Clinic Collect  -     Group A Streptococcus PCR Throat Swab        Symptomatic treat with gargles, lozenges, and OTC analgesic as needed. Follow-up with primary clinic if not improving.  include the following:    Try a sip of water first thing after waking up.    Keep your throat moist by drinking 6 or more glasses of clear liquids every day.    Run a cool-air humidifier in your room overnight.    Stay away from cigarette smoke.     Check the air quality index,if air pollution gives you a sore throat. On high pollution days, try to limit outdoor time.    Suck on throat lozenges, cough drops, hard candy, ice chips, or frozen fruit-juice bars. Use the sugar-free versions if your diet or medical condition requires them.          SUBJECTIVE:  Murray Nicholson is a 67 year old male with a chief complaint of sore throat.  Onset of symptoms was 2 day(s) ago.    Course of illness: sudden onset.  Severity mild  Current and Associated symptoms: sore throat  Treatment measures tried include Tylenol/Ibuprofen.  Predisposing factors include None.    Past Medical History:   Diagnosis Date     (HFpEF) heart failure with preserved ejection fraction (H)      Allergic rhinitis, cause unspecified      Anemia of chronic kidney failure      Aortic stenosis 08/13/2008    Overview:  Bicuspid aortic valve     AS (aortic stenosis)      Ascending aortic aneurysm      Bicuspid aortic valve      Bilateral hand pain 06/01/2021     CAD (coronary artery disease)      Congestive heart failure, unspecified      Coronary artery disease involving native artery of transplanted heart without angina pectoris 07/10/2014     Dialysis patient (H)      Tues-Thur-Sat     Dyslipidemia      Esophageal reflux      ESRD (end stage renal disease) (H)      Hearing problem      Heart replaced by transplant (H) 12/10/2018     Hypersomnia with sleep apnea, unspecified      Hypertension      Ileostomy status (H)      Immunosuppression (H)      MGUS (monoclonal gammopathy of unknown significance)      Mitral regurgitation      SHEELA (obstructive sleep apnea)     No CPAP     Pneumonia      Sigmoid diverticulitis 08/14/2018     Status post coronary angiogram 06/28/2019     Systolic heart failure (H)      Type 2 diabetes mellitus (H)      Ventral hernia without obstruction or gangrene      Current Outpatient Medications   Medication Sig Dispense Refill     amLODIPine (NORVASC) 5 MG tablet Take 1 tablet (5 mg) by mouth daily 90 tablet 3     aspirin 81 MG EC tablet Take 81 mg by mouth every evening       atorvastatin (LIPITOR) 40 MG tablet Take 1 tablet (40 mg) by mouth every evening 90 day supply if able 90 tablet 3     biotin 1000 MCG TABS tablet Take 1,000 mcg by mouth every morning Patient takes every other day       blood glucose (ACCU-CHEK GUIDE) test strip Use to test blood sugar 3 times daily or as directed. 300 strip 3     blood glucose monitoring (ACCU-CHEK FASTCLIX) lancets USE TO TEST 3 TIMES DAILY OR AS DIRECTED. 300 each 3     calcium citrate and vitamin D (CITRACAL) 200-250 MG-UNIT TABS per tablet Take 1 tablet by mouth 2 times daily       Calcium Polycarbophil (FIBER) 625 MG tablet Take by mouth every morning       co-enzyme Q-10 30 MG CAPS capsule Take by mouth every morning       fluticasone (FLONASE) 50 MCG/ACT nasal spray SHAKE LIQUID AND USE 1 SPRAY IN EACH NOSTRIL DAILY (Patient taking differently: every evening SHAKE LIQUID AND USE 1 SPRAY IN EACH NOSTRIL DAILY) 16 g 11     loratadine (CLARITIN) 10 MG tablet Take 10 mg by mouth every evening Patient takes at bedtime       magnesium 250 MG tablet Take 1 tablet by mouth every morning       metFORMIN (GLUCOPHAGE  XR) 500 MG 24 hr tablet Take 4 tabs every  tablet 0     Misc Natural Products (TART CHERRY ADVANCED PO) Take by mouth every morning       Multiple Vitamins-Minerals (HAIR SKIN NAILS PO) Take by mouth every morning       multivitamin (CENTRUM SILVER) tablet Take 1 tablet by mouth every morning       mycophenolate (GENERIC EQUIVALENT) 250 MG capsule Take 3 capsules (750 mg) by mouth 2 times daily 180 capsule 11     nystatin (MYCOSTATIN) 070592 UNIT/ML suspension Take 5 mLs (500,000 Units) by mouth 4 times daily 90 mL 0     OMEPRAZOLE PO Take 20 mg by mouth every evening       sulfamethoxazole-trimethoprim (BACTRIM) 400-80 MG tablet Take 1 tablet by mouth 2 times daily       sulfamethoxazole-trimethoprim (BACTRIM) 400-80 MG tablet Take 1 tablet by mouth daily (Patient taking differently: Take 1 tablet by mouth every morning) 30 tablet 11     tacrolimus (GENERIC EQUIVALENT) 0.5 MG capsule Take 1 capsule (0.5 mg) by mouth every evening 90 capsule 3     tacrolimus (GENERIC EQUIVALENT) 1 MG capsule TAKE 1 CAPSULE BY MOUTH EVERY MORNING. 90 capsule 3     Social History     Tobacco Use     Smoking status: Former     Packs/day: 1.00     Years: 20.00     Pack years: 20.00     Types: Cigarettes     Start date: 1984     Quit date: 1994     Years since quittin.3     Smokeless tobacco: Never   Substance Use Topics     Alcohol use: Yes     Comment: Occasionally       ROS:  Review of systems negative except as stated above.    OBJECTIVE:   /87   Pulse 108   Temp 99  F (37.2  C) (Temporal)   Resp 18   Wt 80.3 kg (177 lb)   SpO2 100%   BMI 26.33 kg/m    GENERAL APPEARANCE: healthy, alert and no distress  EYES: EOMI,  PERRL, conjunctiva clear  HENT: ear canals and TM's normal.  Nose normal.  Pharynx erythematous with no exudate noted.  NECK: supple, non-tender to palpation, no adenopathy noted  RESP: lungs clear to auscultation - no rales, rhonchi or wheezes  CV: regular rates and rhythm, normal S1 S2, no  murmur noted  PSYCH: mentation appears normal    Rapid Strep test is negative; await throat culture results.      Enriqueta Ashley MD

## 2022-12-22 LAB — GROUP A STREP BY PCR: NOT DETECTED

## 2022-12-23 ENCOUNTER — MYC MEDICAL ADVICE (OUTPATIENT)
Dept: TRANSPLANT | Facility: CLINIC | Age: 67
End: 2022-12-23

## 2022-12-23 DIAGNOSIS — Z94.1 HEART REPLACED BY TRANSPLANT (H): ICD-10-CM

## 2022-12-23 DIAGNOSIS — Z13.6 ENCOUNTER FOR LIPID SCREENING FOR CARDIOVASCULAR DISEASE: ICD-10-CM

## 2022-12-23 DIAGNOSIS — Z94.0 KIDNEY REPLACED BY TRANSPLANT: Primary | ICD-10-CM

## 2022-12-23 DIAGNOSIS — Z79.899 ENCOUNTER FOR LONG-TERM (CURRENT) USE OF MEDICATIONS: ICD-10-CM

## 2022-12-23 DIAGNOSIS — Z13.220 ENCOUNTER FOR LIPID SCREENING FOR CARDIOVASCULAR DISEASE: ICD-10-CM

## 2023-01-03 DIAGNOSIS — Z94.0 KIDNEY REPLACED BY TRANSPLANT: ICD-10-CM

## 2023-01-03 DIAGNOSIS — Z94.0 KIDNEY TRANSPLANTED: ICD-10-CM

## 2023-01-03 DIAGNOSIS — E87.20 METABOLIC ACIDOSIS: ICD-10-CM

## 2023-01-03 RX ORDER — TACROLIMUS 1 MG/1
1 CAPSULE ORAL EVERY MORNING
Qty: 90 CAPSULE | Refills: 3 | Status: SHIPPED | OUTPATIENT
Start: 2023-01-03 | End: 2023-06-23

## 2023-01-11 DIAGNOSIS — Z94.1 HEART REPLACED BY TRANSPLANT (H): ICD-10-CM

## 2023-01-11 RX ORDER — ATORVASTATIN CALCIUM 40 MG/1
40 TABLET, FILM COATED ORAL EVERY EVENING
Qty: 90 TABLET | Refills: 3 | Status: SHIPPED | OUTPATIENT
Start: 2023-01-11 | End: 2023-10-17

## 2023-01-17 ENCOUNTER — MYC MEDICAL ADVICE (OUTPATIENT)
Dept: NEPHROLOGY | Facility: CLINIC | Age: 68
End: 2023-01-17
Payer: MEDICARE

## 2023-01-17 DIAGNOSIS — Z94.0 KIDNEY REPLACED BY TRANSPLANT: Primary | ICD-10-CM

## 2023-01-24 NOTE — PROGRESS NOTES
HISTORY OF PRESENT ILLNESS:  The patient is for followup today.   He has an area of the palate that seems to be a little bit eroded and it was either from some sort of an odd fungal infection, transplant reaction, or some kind of osteopenia after a lot of his surgery.  It has not been bothering him very much.  We put a 2-3 cm area out and I was worried this was going to be some kind of invasive fungus, cancer, etc.  He comes now and is much improved, but he has some localized pain.      PHYSICAL EXAMINATION:  Alert, oriented x3 and pleasant.  Skin of the face, lips, and neck on him is quite normal.  Neck exam shows no masses or adenopathy.  Teeth are not loose.  When I look near the foramen on him bilaterally, there is clearly some bone that looks to be devitalized.  It does not form sequestrum.  Near it seems to be some granulation tissue that wants to grow over this.  It looks better than it did before, and it is a very strange appearance.  No other masses or lesions are seen.      ASSESSMENT:  Patient with a history of mouth lesion.       PLAN:  I told him that we should just may be perhaps drill this area down and see if it heals over, drill down the active bleeding.  It may be some kind of unusual side effect from his transplantation or something with one of the medicines that would cause him to have almost like a reaction to bone that is similar to what one sees with bisphosphonates.  I will plan to see him after the New Year in eight weeks or so.  I told him a short trip to the operating room would be useful to grind down the bone to see if we can get it to heal.      FO/ms       
[FreeTextEntry1] : Medical assistant present for duration of physical examination\par \par General no acute distress, alert and oriented\par Psych calm, responding appropriately to questions.\par Nonlabored breathing\par Ambulating without assistance\par Skin no visible skin changes\par \par Anorectal Exam:\par Inspection no cellulitis, anterior midline and posterior midline fissurectomy site clean with inflammation of adjacent skin \par \par Patient tolerated examination well.\par \par \par

## 2023-01-30 DIAGNOSIS — Z94.0 KIDNEY REPLACED BY TRANSPLANT: ICD-10-CM

## 2023-01-30 DIAGNOSIS — D84.9 IMMUNOSUPPRESSED STATUS (H): Primary | ICD-10-CM

## 2023-01-30 RX ORDER — SULFAMETHOXAZOLE AND TRIMETHOPRIM 400; 80 MG/1; MG/1
1 TABLET ORAL EVERY MORNING
Qty: 90 TABLET | Refills: 3 | Status: SHIPPED | OUTPATIENT
Start: 2023-01-30 | End: 2024-01-29

## 2023-01-31 ENCOUNTER — LAB (OUTPATIENT)
Dept: LAB | Facility: CLINIC | Age: 68
End: 2023-01-31
Payer: MEDICARE

## 2023-01-31 DIAGNOSIS — Z13.6 ENCOUNTER FOR LIPID SCREENING FOR CARDIOVASCULAR DISEASE: ICD-10-CM

## 2023-01-31 DIAGNOSIS — Z94.1 HEART REPLACED BY TRANSPLANT (H): ICD-10-CM

## 2023-01-31 DIAGNOSIS — Z79.899 ENCOUNTER FOR LONG-TERM (CURRENT) USE OF MEDICATIONS: ICD-10-CM

## 2023-01-31 DIAGNOSIS — E11.29 TYPE 2 DIABETES MELLITUS WITH OTHER DIABETIC KIDNEY COMPLICATION, WITHOUT LONG-TERM CURRENT USE OF INSULIN (H): ICD-10-CM

## 2023-01-31 DIAGNOSIS — Z94.0 KIDNEY REPLACED BY TRANSPLANT: ICD-10-CM

## 2023-01-31 DIAGNOSIS — Z13.220 ENCOUNTER FOR LIPID SCREENING FOR CARDIOVASCULAR DISEASE: ICD-10-CM

## 2023-01-31 LAB
ALBUMIN MFR UR ELPH: 18.6 MG/DL (ref 1–14)
ALBUMIN SERPL BCG-MCNC: 4.4 G/DL (ref 3.5–5.2)
ALP SERPL-CCNC: 129 U/L (ref 40–129)
ALT SERPL W P-5'-P-CCNC: 22 U/L (ref 10–50)
ANION GAP SERPL CALCULATED.3IONS-SCNC: 10 MMOL/L (ref 7–15)
AST SERPL W P-5'-P-CCNC: 27 U/L (ref 10–50)
BILIRUB SERPL-MCNC: 0.8 MG/DL
BUN SERPL-MCNC: 12.8 MG/DL (ref 8–23)
CALCIUM SERPL-MCNC: 9.9 MG/DL (ref 8.8–10.2)
CHLORIDE SERPL-SCNC: 102 MMOL/L (ref 98–107)
CHOLEST SERPL-MCNC: 130 MG/DL
CK SERPL-CCNC: 212 U/L (ref 39–308)
CREAT SERPL-MCNC: 0.98 MG/DL (ref 0.67–1.17)
CREAT UR-MCNC: 91.4 MG/DL
DEPRECATED HCO3 PLAS-SCNC: 24 MMOL/L (ref 22–29)
GFR SERPL CREATININE-BSD FRML MDRD: 84 ML/MIN/1.73M2
GLUCOSE SERPL-MCNC: 144 MG/DL (ref 70–99)
HBA1C MFR BLD: 6.9 %
HDLC SERPL-MCNC: 56 MG/DL
LDLC SERPL CALC-MCNC: 48 MG/DL
MAGNESIUM SERPL-MCNC: 1.3 MG/DL (ref 1.7–2.3)
NONHDLC SERPL-MCNC: 74 MG/DL
PHOSPHATE SERPL-MCNC: 2.8 MG/DL (ref 2.5–4.5)
POTASSIUM SERPL-SCNC: 4.1 MMOL/L (ref 3.4–5.3)
PROT SERPL-MCNC: 7.4 G/DL (ref 6.4–8.3)
PROT/CREAT 24H UR: 0.2 MG/MG CR (ref 0–0.2)
SODIUM SERPL-SCNC: 136 MMOL/L (ref 136–145)
TACROLIMUS BLD-MCNC: 5.4 UG/L (ref 5–15)
TME LAST DOSE: NORMAL H
TME LAST DOSE: NORMAL H
TRIGL SERPL-MCNC: 129 MG/DL

## 2023-01-31 PROCEDURE — 83735 ASSAY OF MAGNESIUM: CPT | Performed by: PATHOLOGY

## 2023-01-31 PROCEDURE — 83036 HEMOGLOBIN GLYCOSYLATED A1C: CPT | Performed by: PHYSICIAN ASSISTANT

## 2023-01-31 PROCEDURE — 80197 ASSAY OF TACROLIMUS: CPT | Performed by: INTERNAL MEDICINE

## 2023-01-31 PROCEDURE — 36415 COLL VENOUS BLD VENIPUNCTURE: CPT | Performed by: PATHOLOGY

## 2023-01-31 PROCEDURE — 80061 LIPID PANEL: CPT | Performed by: PATHOLOGY

## 2023-01-31 PROCEDURE — 80053 COMPREHEN METABOLIC PANEL: CPT | Performed by: PATHOLOGY

## 2023-01-31 PROCEDURE — 84100 ASSAY OF PHOSPHORUS: CPT | Performed by: PATHOLOGY

## 2023-01-31 PROCEDURE — 84156 ASSAY OF PROTEIN URINE: CPT | Performed by: INTERNAL MEDICINE

## 2023-01-31 PROCEDURE — 82550 ASSAY OF CK (CPK): CPT | Performed by: PATHOLOGY

## 2023-02-03 NOTE — PROGRESS NOTES
Outcome for 02/03/23 10:30 AM: [employer] message sent  Paulina Curiel MA  Outcome for 02/08/23 1:18 PM: Per patient, will send BG readings via Mayelin Curiel MA  Outcome for 02/09/23 8:37 AM: Glucose Readings sent via [employer] Curiel MA      Patient is showing 5/5 [place] met.   Paulina Curiel MA

## 2023-02-08 ENCOUNTER — TELEPHONE (OUTPATIENT)
Dept: ENDOCRINOLOGY | Facility: CLINIC | Age: 68
End: 2023-02-08
Payer: MEDICARE

## 2023-02-08 ENCOUNTER — TELEPHONE (OUTPATIENT)
Dept: CARDIOLOGY | Facility: CLINIC | Age: 68
End: 2023-02-08
Payer: MEDICARE

## 2023-02-08 DIAGNOSIS — Z94.1 HEART REPLACED BY TRANSPLANT (H): Primary | ICD-10-CM

## 2023-02-08 DIAGNOSIS — I10 PRIMARY HYPERTENSION: ICD-10-CM

## 2023-02-08 NOTE — TELEPHONE ENCOUNTER
Outcome for 02/08/23 1:22 PM: Per patient, will send BG readings via [facility]  Paulina Curiel MA

## 2023-02-08 NOTE — TELEPHONE ENCOUNTER
[facility] Call Center    Phone Message    May a detailed message be left on voicemail: yes     Reason for Call: Other: Murray calling to schedule his June follow up appointment with Dr. Ernst along with some labs.  Please call him back to discuss schedulng options for Return Heart Transplant.  Thank you!     Action Taken: Message routed to:  Other: Cardiology    Travel Screening: Not Applicable

## 2023-02-10 ENCOUNTER — VIRTUAL VISIT (OUTPATIENT)
Dept: ENDOCRINOLOGY | Facility: CLINIC | Age: 68
End: 2023-02-10
Payer: MEDICARE

## 2023-02-10 ENCOUNTER — MYC MEDICAL ADVICE (OUTPATIENT)
Dept: TRANSPLANT | Facility: CLINIC | Age: 68
End: 2023-02-10

## 2023-02-10 DIAGNOSIS — E11.29 TYPE 2 DIABETES MELLITUS WITH OTHER DIABETIC KIDNEY COMPLICATION, WITHOUT LONG-TERM CURRENT USE OF INSULIN (H): ICD-10-CM

## 2023-02-10 PROCEDURE — 99214 OFFICE O/P EST MOD 30 MIN: CPT | Mod: VID | Performed by: PHYSICIAN ASSISTANT

## 2023-02-10 RX ORDER — METFORMIN HCL 500 MG
TABLET, EXTENDED RELEASE 24 HR ORAL
Qty: 360 TABLET | Refills: 3 | Status: SHIPPED | OUTPATIENT
Start: 2023-02-10 | End: 2024-03-28

## 2023-02-10 NOTE — NURSING NOTE
Is the patient currently in the state of [place]? YES    Visit mode:VIDEO    If the visit is dropped, the patient can be reconnected by: VIDEO VISIT: Text to cell phone: [phone]    Will anyone else be joining the visit? NO      How would you like to obtain your AVS? MyChart    Are changes needed to the allergy or medication list? NO    Comments or concerns regarding today's visit:

## 2023-02-10 NOTE — PROGRESS NOTES
Due to the COVID 19 pandemic this visit was converted to a video visit in order to help prevent spread of infection in this patient and the general population.    Time of start: 10:30 am  Time of end: 10:46 am  Total duration of video visit: 16 minutes.  Providers location: offsite.  Patients location: home-[place].    HPI  Murray Nicholson is a 68 year old male with type 2 diabetes mellitus. Video visit for diabetes follow up today.  Pt last seen by me in Nov 2022 and seen by Dr. Merida in Aug 2021.  Pt gives a hx of type 2 diabetes > 20 years complicated by ESRD s/p kidney transplant in Dec 2019.   Pt denies known hx of retinopathy or sx of neuropathy.  He has hx CAD s/p heart transplant in June 2018.  Pt also has hx of HTN, hyperlipidemia, ascending aortic aneurysm and GERD.  For his diabetes, he is currently taking Metformin 500 mg 4 tabs in am.  Most recent A1C was 6.9 % on 1/31/2023 and previous A1C was  7.0 % on 6/13/2022.  Pt has been eating healthier and smaller portions.  See blood sugar data scanned below.  On ROS today, he is eating healthier now and smaller portions and less sweets.  He has increased his activity.  Denies blurred vision, n/v, SOB at rest, cough, fever, chest pain or abd pain.  No diarrhea, dysuria or hematuria.  He denies sx of neuropathy or foot ulcers.    Diabetes Care  Retinopathy: none per patient; pt seen by [facility] in June 2022.  Nephropathy:hx of ESRD s/p kidney transplant in 12/2019.  Neuropathy: none per patient.  Foot Exam: no exam today.  Taking aspirin: yes.  Lipids: LDL 48 in 1/2023. Pt taking Lipitor.  Insulin: none.  DM meds: Metformin.  Testing: glucose meter.        ROS  See under HPI.      Allergies  Allergies   Allergen Reactions     Norco [Hydrocodone-Acetaminophen] Nausea and Vomiting     Cats      Throat tightness     Isosorbide Other (See Comments)     hypotension     Penicillins Hives     Seasonal Allergies      rhinitis     Shrimp      Throat closes         Medications  Current Outpatient Medications   Medication Sig Dispense Refill     amLODIPine (NORVASC) 5 MG tablet Take 1 tablet (5 mg) by mouth daily 90 tablet 3     aspirin 81 MG EC tablet Take 81 mg by mouth every evening       atorvastatin (LIPITOR) 40 MG tablet Take 1 tablet (40 mg) by mouth every evening 90 day supply if able 90 tablet 3     biotin 1000 MCG TABS tablet Take 1,000 mcg by mouth every morning Patient takes every other day       blood glucose (ACCU-CHEK GUIDE) test strip Use to test blood sugar 3 times daily or as directed. 300 strip 3     blood glucose monitoring (ACCU-CHEK FASTCLIX) lancets USE TO TEST 3 TIMES DAILY OR AS DIRECTED. 300 each 3     calcium citrate and vitamin D (CITRACAL) 200-250 MG-UNIT TABS per tablet Take 1 tablet by mouth 2 times daily       Calcium Polycarbophil (FIBER) 625 MG tablet Take by mouth every morning       co-enzyme Q-10 30 MG CAPS capsule Take by mouth every morning       fluticasone (FLONASE) 50 MCG/ACT nasal spray SHAKE LIQUID AND USE 1 SPRAY IN EACH NOSTRIL DAILY (Patient taking differently: every evening SHAKE LIQUID AND USE 1 SPRAY IN EACH NOSTRIL DAILY) 16 g 11     loratadine (CLARITIN) 10 MG tablet Take 10 mg by mouth every evening Patient takes at bedtime       magnesium 250 MG tablet Take 1 tablet by mouth every morning       metFORMIN (GLUCOPHAGE XR) 500 MG 24 hr tablet Take 4 tabs every  tablet 3     Misc Natural Products (TART CHERRY ADVANCED PO) Take by mouth every morning       Multiple Vitamins-Minerals (HAIR SKIN NAILS PO) Take by mouth every morning       multivitamin (CENTRUM SILVER) tablet Take 1 tablet by mouth every morning       mycophenolate (GENERIC EQUIVALENT) 250 MG capsule Take 3 capsules (750 mg) by mouth 2 times daily 180 capsule 11     nystatin (MYCOSTATIN) 039632 UNIT/ML suspension Take 5 mLs (500,000 Units) by mouth 4 times daily 90 mL 0     OMEPRAZOLE PO Take 20 mg by mouth every evening        sulfamethoxazole-trimethoprim (BACTRIM) 400-80 MG tablet Take 1 tablet by mouth every morning 90 tablet 3     tacrolimus (GENERIC EQUIVALENT) 0.5 MG capsule Take 1 capsule (0.5 mg) by mouth every evening 90 capsule 3     tacrolimus (GENERIC EQUIVALENT) 1 MG capsule Take 1 capsule (1 mg) by mouth every morning 90 capsule 3     sulfamethoxazole-trimethoprim (BACTRIM) 400-80 MG tablet Take 1 tablet by mouth 2 times daily         Family History  family history includes C.A.D. in his father; Cerebrovascular Disease in his father; Diabetes in his father; Hypertension in his father.    Social History   reports that he quit smoking about 28 years ago. His smoking use included cigarettes. He started smoking about 39 years ago. He has a 20.00 pack-year smoking history. He has never used smokeless tobacco. He reports current alcohol use. He reports that he does not use drugs.     Past Medical History  Past Medical History:   Diagnosis Date     (HFpEF) heart failure with preserved ejection fraction (H)      Allergic rhinitis, cause unspecified      Anemia of chronic kidney failure      Aortic stenosis 08/13/2008    Overview:  Bicuspid aortic valve     AS (aortic stenosis)      Ascending aortic aneurysm      Bicuspid aortic valve      Bilateral hand pain 06/01/2021     CAD (coronary artery disease)      Congestive heart failure, unspecified      Coronary artery disease involving native artery of transplanted heart without angina pectoris 07/10/2014     Dialysis patient (H)     Tues-Thur-Sat     Dyslipidemia      Esophageal reflux      ESRD (end stage renal disease) (H)      Hearing problem      Heart replaced by transplant (H) 12/10/2018     Hypersomnia with sleep apnea, unspecified      Hypertension      Ileostomy status (H)      Immunosuppression (H)      MGUS (monoclonal gammopathy of unknown significance)      Mitral regurgitation      SHEELA (obstructive sleep apnea)     No CPAP     Pneumonia      Sigmoid diverticulitis  08/14/2018     Status post coronary angiogram 06/28/2019     Systolic heart failure (H)      Type 2 diabetes mellitus (H)      Ventral hernia without obstruction or gangrene        Past Surgical History:   Procedure Laterality Date     BIOPSY       CARDIAC SURGERY       COLONOSCOPY N/A 5/3/2018    Procedure: COLONOSCOPY;  colonoscopy ;  Surgeon: Ammon Castillo MD;  Location: [facility]     CREATE FISTULA ARTERIOVENOUS UPPER EXTREMITY BOVINE Left 5/8/2019    Procedure: Left Upper Extremity Arteriovenous Bovine Graft Creation;  Surgeon: Calin Cheney MD;  Location: [facility]     CV CORONARY ANGIOGRAM N/A 6/28/2019    Procedure: CV CORONARY ANGIOGRAM;  Surgeon: Montrell Posada MD;  Location: [facility]     CV CORONARY ANGIOGRAM N/A 7/15/2020    Procedure: CV CORONARY ANGIOGRAM;  Surgeon: Marcelo Ramírez MD;  [facility]     CV CORONARY ANGIOGRAM N/A 6/7/2021    Procedure: CV CORONARY ANGIOGRAM;  Surgeon: Gilberto Mccann MD;  [facility]     CV CORONARY ANGIOGRAM N/A 6/13/2022    Procedure: Coronary Angiogram;  Surgeon: Marcelo Ramírez MD;  [facility]     CV HEART BIOPSY N/A 2/1/2019    Procedure: HBX;  Surgeon: Montrell Posada MD;  [facility]     CV HEART BIOPSY N/A 3/22/2019    Procedure: HBX, RIJV ACCESS;  Surgeon: Jordan Fox MD;  [facility]     CV HEART BIOPSY N/A 6/28/2019    Procedure: CV HEART BIOPSY;  Surgeon: Montrell Posada MD;  [facility]     CV HEART BIOPSY N/A 10/28/2019    Procedure: CV HEART BIOPSY;  Surgeon: Marcelo Ramírez MD;  [facility]     CV HEART BIOPSY N/A 2/6/2020    Procedure: CV HEART BIOPSY;  Surgeon: Montrell Posada MD;  [facility]     CV HEART BIOPSY N/A 7/15/2020    Procedure: CV HEART BIOPSY;  Surgeon: Marcelo Ramírez MD;   Location:  [facility] CARDIAC CATH LAB     CV RIGHT HEART CATH MEASUREMENTS RECORDED N/A 6/28/2019    Procedure: CV RIGHT HEART CATH;  Surgeon: Montrell Posada MD;  Location:  HEART CARDIAC CATH LAB     CV RIGHT HEART CATH MEASUREMENTS RECORDED N/A 7/15/2020    Procedure: CV RIGHT HEART CATH;  Surgeon: Marcelo Ramírez MD;  Location:  HEART CARDIAC CATH LAB     CV RIGHT HEART CATH MEASUREMENTS RECORDED N/A 6/7/2021    Procedure: CV RIGHT HEART CATH;  Surgeon: Gilberto Mccann MD;  Location:  HEART CARDIAC CATH LAB     CV RIGHT HEART CATH MEASUREMENTS RECORDED N/A 6/13/2022    Procedure: Right Heart Catheterization;  Surgeon: Marcelo Ramírez MD;  Location:  HEART CARDIAC CATH LAB     ENDOSCOPIC ULTRASOUND UPPER GASTROINTESTINAL TRACT (GI) N/A 11/8/2021    Procedure: ENDOSCOPIC ULTRASOUND, ESOPHAGOSCOPY / UPPER GASTROINTESTINAL TRACT (GI)  EUS with FNA;  Surgeon: Alon Don MD;  Location: [facility]     ESOPHAGOSCOPY, GASTROSCOPY, DUODENOSCOPY (EGD), COMBINED N/A 5/7/2018    Procedure: COMBINED ENDOSCOPIC ULTRASOUND, ESOPHAGOSCOPY, GASTROSCOPY, DUODENOSCOPY (EGD), FINE NEEDLE ASPIRATE/BIOPSY;  Endoscopic Ultrasound with Fine Needle Aspiration ;  Surgeon: Alon Don MD;  [facility]     EXAM UNDER ANESTHESIA RECTUM N/A 8/12/2018    Procedure: EXAM UNDER ANESTHESIA RECTUM;  EXAM UNDER ANESTHESIA RECTUM ,COMBINED INCISION AND DRAINAGE OF RECTAL ABCESS ;  Surgeon: Rick Tran MD;  [facility]     HERNIORRHAPHY VENTRAL N/A 6/24/2022    Procedure: open mesh repair, incisional ventral hernia;  Surgeon: Shaheed Curtis MD;  [facility]     INCISION AND DRAINAGE RECTUM, COMBINED N/A 8/12/2018    Procedure: COMBINED INCISION AND DRAINAGE RECTUM;;  Surgeon: Rick Tran MD;  [facility]     IR DIALYSIS FISTULOGRAM LEFT  9/25/2019     IR DIALYSIS FISTULOGRAM LEFT  11/22/2019     IR DIALYSIS PTA  9/25/2019     IR DIALYSIS PTA  11/22/2019      LAPAROSCOPIC ASSISTED COLOSTOMY TAKEDOWN N/A 12/11/2018    Procedure: Laparoscopic Assisted Colostomy Takedown, Laparoscopic Lysis of Adhesions;  Surgeon: Rick Tran MD;  [facility]     LAPAROSCOPIC ASSISTED SIGMOID COLECTOMY N/A 8/14/2018    Procedure: LAPAROSCOPIC ASSISTED SIGMOID COLECTOMY;  Laparoscopic Hand Assisted Takedown of Splenic Flexure, Sigmoidectomy, Small Bowel Resection, Takedown of Small Bowel to Colon Fistula;  Surgeon: Rick Tran MD;  [facility]     LAPAROSCOPIC HERNIORRHAPHY INGUINAL BILATERAL Bilateral 7/24/2015    Procedure: LAPAROSCOPIC HERNIORRHAPHY INGUINAL BILATERAL;  Surgeon: Bobby Mcconnell MD;  [facility]     LAPAROSCOPIC INSERTION CATHETER PERITONEAL DIALYSIS N/A 6/22/2017    Procedure: LAPAROSCOPIC INSERTION CATHETER PERITONEAL DIALYSIS;  Laparoscopic Peritoneal Dialysis Catheter Placement - Anesthesia with block;  Surgeon: Esteban Arvizu MD;  [facility]     PICC INSERTION Left 04/22/2018    5Fr - 49cm (3cm external), Basilic vein, low SVC     REMOVE CATHETER PERITONEAL Right 1/15/2018    Procedure: REMOVE CATHETER PERITONEAL;  Open Removal of Peritoneal Dialysis Catheter ;  Surgeon: Esteban Arvizu MD;  [facility]     SIGMOIDOSCOPY FLEXIBLE N/A 11/21/2018    Procedure: Examination Under Anesthesia, Flexible Sigmoidoscopy and Polypectomy;  Surgeon: Rick Tran MD;  [facility]     SIGMOIDOSCOPY FLEXIBLE N/A 12/11/2018    Procedure: Flexible Sigmoidoscopy;  Surgeon: Rick Tran MD;  [facility]     TAKEDOWN ILEOSTOMY N/A 3/27/2019    Procedure: Takedown Ileostomy;  Surgeon: Rick Tran MD;  [facility]     TRANSPLANT HEART RECIPIENT N/A 6/14/2018    Procedure: TRANSPLANT HEART RECIPIENT;  Median Sternotomy, on-pump oxygenator, Heart Transplant;  Surgeon: Rony Caputo MD;  [facility]     TRANSPLANT KIDNEY RECIPIENT LIVING UNRELATED  12/2019        Physical Exam    No exam today.    RESULTS  Creatinine   Date Value Ref Range Status   01/31/2023 0.98 0.67 - 1.17 mg/dL Final   07/06/2021 1.09 0.66 - 1.25 mg/dL Final     GFR Estimate   Date Value Ref Range Status   01/31/2023 84 >60 mL/min/1.73m2 Final     Comment:     eGFR calculated using 2021 CKD-EPI equation.   07/06/2021 70 >60 mL/min/[1.73_m2] Final     Comment:     Non  GFR Calc  Starting 12/18/2018, serum creatinine based estimated GFR (eGFR) will be   calculated using the Chronic Kidney Disease Epidemiology Collaboration   (CKD-EPI) equation.       Hemoglobin A1C   Date Value Ref Range Status   01/31/2023 6.9 (H) <5.7 % Final     Comment:     Normal <5.7%   Prediabetes 5.7-6.4%    Diabetes 6.5% or higher     Note: Adopted from ADA consensus guidelines.   06/07/2021 6.2 (H) 0 - 5.6 % Final     Comment:     Normal <5.7% Prediabetes 5.7-6.4%  Diabetes 6.5% or higher - adopted from ADA   consensus guidelines.       Potassium   Date Value Ref Range Status   01/31/2023 4.1 3.4 - 5.3 mmol/L Final   08/09/2022 3.7 3.4 - 5.3 mmol/L Final   07/06/2021 3.9 3.4 - 5.3 mmol/L Final     ALT   Date Value Ref Range Status   01/31/2023 22 10 - 50 U/L Final   06/07/2021 28 0 - 70 U/L Final     AST   Date Value Ref Range Status   01/31/2023 27 10 - 50 U/L Final     Comment:     Specimen is hemolyzed which can falsely elevate AST. Analysis of a non-hemolyzed specimen may result in a lower value.   06/07/2021 27 0 - 45 U/L Final     TSH   Date Value Ref Range Status   09/07/2021 1.89 0.40 - 4.00 mU/L Final   04/16/2018 2.25 0.40 - 4.00 mU/L Final       Cholesterol   Date Value Ref Range Status   01/31/2023 130 <200 mg/dL Final   06/13/2022 114 <200 mg/dL Final   06/07/2021 120 <200 mg/dL Final   12/09/2020 116 <200 mg/dL Final     HDL Cholesterol   Date Value Ref Range Status   06/07/2021 49 >39 mg/dL Final   12/09/2020 47 >39 mg/dL Final     Direct Measure HDL   Date Value Ref Range Status   01/31/2023  56 >=40 mg/dL Final   06/13/2022 45 >=40 mg/dL Final     LDL Cholesterol Calculated   Date Value Ref Range Status   01/31/2023 48 <=100 mg/dL Final   06/13/2022 35 <=100 mg/dL Final   06/07/2021 28 <100 mg/dL Final     Comment:     Desirable:       <100 mg/dl   12/09/2020 26 <100 mg/dL Final     Comment:     Desirable:       <100 mg/dl     Triglycerides   Date Value Ref Range Status   01/31/2023 129 <150 mg/dL Final   06/13/2022 170 (H) <150 mg/dL Final   06/07/2021 215 (H) <150 mg/dL Final     Comment:     Borderline high:  150-199 mg/dl  High:             200-499 mg/dl  Very high:       >499 mg/dl     12/09/2020 214 (H) <150 mg/dL Final     Comment:     Borderline high:  150-199 mg/dl  High:             200-499 mg/dl  Very high:       >499 mg/dl       Cholesterol/HDL Ratio   Date Value Ref Range Status   08/10/2015 5.0 0.0 - 5.0 Final   04/09/2015 4.0 0.0 - 5.0 Final         ASSESSMENT/PLAN:    1.  TYPE 2 DIABETES MELLITUS: Patient's blood sugar values have improved.  Continue current dose of Metformin, healthy eating, portion control and exercise.  Pt seen by [facility] in June 2022 without retinopathy per patient.  No sx of neuropathy and he denies foot ulcers.  /89 at home today.    2.  HX OF ESRD: S/P kidney transplant in Dec 2019.  Most recent creat 0.98 with GFR 84 mL/min in Jan 2023.    3.  CARDIAC: S/P heart transplant in 6/2018.    4.  LIPIDS: LDL 48 in Jan 2023.  Pt taking Lipitor.    5.  FOLLOW UP: With me in 6 months.    Time spent reviewing chart, labs and glucose data today = 6 minutes.  Time for video visit today = 16 minutes.  Time for documentation today = 15 minutes.    Total time for visit today = 37 minutes.    Rosa Calvert PA-C      Answers for HPI/ROS submitted by the patient on 2/9/2023  General Symptoms: No  Skin Symptoms: No  HENT Symptoms: No  EYE SYMPTOMS: No  HEART SYMPTOMS: No  LUNG SYMPTOMS: No  INTESTINAL SYMPTOMS: No  URINARY SYMPTOMS: No  REPRODUCTIVE SYMPTOMS: No  SKELETAL  SYMPTOMS: No  BLOOD SYMPTOMS: No  NERVOUS SYSTEM SYMPTOMS: No  MENTAL HEALTH SYMPTOMS: No

## 2023-02-10 NOTE — LETTER
2/10/2023       RE: Murray Nicholson  [address]  [place] [address]     Dear Colleague,    Thank you for referring your patient, Murray Nicholson, to the [facility] [place] at [facility]. Please see a copy of my visit note below.    Outcome for 02/03/23 10:30 AM: [employer] message sent  Paulina Curiel MA  Outcome for 02/08/23 1:18 PM: Per patient, will send BG readings via [employer]  Paulina Curiel MA  Outcome for 02/09/23 8:37 AM: Glucose Readings sent via [employer]  Paulina Curiel MA      Patient is showing 5/5 MNCM met.   Paulina Curiel MA    Due to the COVID 19 pandemic this visit was converted to a video visit in order to help prevent spread of infection in this patient and the general population.    Time of start: 10:30 am  Time of end: 10:46 am  Total duration of video visit: 16 minutes.  Providers location: offsite.  Patients location: home-[place].    [facility]  Murray Nicholson is a 68 year old male with type 2 diabetes mellitus. Video visit for diabetes follow up today.  Pt last seen by me in Nov 2022 and seen by Dr. Merida in Aug 2021.  Pt gives a hx of type 2 diabetes > 20 years complicated by ESRD s/p kidney transplant in Dec 2019.   Pt denies known hx of retinopathy or sx of neuropathy.  He has hx CAD s/p heart transplant in June 2018.  Pt also has hx of HTN, hyperlipidemia, ascending aortic aneurysm and GERD.  For his diabetes, he is currently taking Metformin 500 mg 4 tabs in am.  Most recent A1C was 6.9 % on 1/31/2023 and previous A1C was  7.0 % on 6/13/2022.  Pt has been eating healthier and smaller portions.  See blood sugar data scanned below.  On ROS today, he is eating healthier now and smaller portions and less sweets.  He has increased his activity.  Denies blurred vision, n/v, SOB at rest, cough, fever, chest pain or abd pain.  No diarrhea, dysuria or hematuria.  He denies sx of neuropathy or foot  ulcers.    Diabetes Care  Retinopathy: none per patient; pt seen by Oph in June 2022.  Nephropathy:hx of ESRD s/p kidney transplant in 12/2019.  Neuropathy: none per patient.  Foot Exam: no exam today.  Taking aspirin: yes.  Lipids: LDL 48 in 1/2023. Pt taking Lipitor.  Insulin: none.  DM meds: Metformin.  Testing: glucose meter.        ROS  See under HPI.      Allergies  Allergies   Allergen Reactions     Norco [Hydrocodone-Acetaminophen] Nausea and Vomiting     Cats      Throat tightness     Isosorbide Other (See Comments)     hypotension     Penicillins Hives     Seasonal Allergies      rhinitis     Shrimp      Throat closes        Medications  Current Outpatient Medications   Medication Sig Dispense Refill     amLODIPine (NORVASC) 5 MG tablet Take 1 tablet (5 mg) by mouth daily 90 tablet 3     aspirin 81 MG EC tablet Take 81 mg by mouth every evening       atorvastatin (LIPITOR) 40 MG tablet Take 1 tablet (40 mg) by mouth every evening 90 day supply if able 90 tablet 3     biotin 1000 MCG TABS tablet Take 1,000 mcg by mouth every morning Patient takes every other day       blood glucose (ACCU-CHEK GUIDE) test strip Use to test blood sugar 3 times daily or as directed. 300 strip 3     blood glucose monitoring (ACCU-CHEK FASTCLIX) lancets USE TO TEST 3 TIMES DAILY OR AS DIRECTED. 300 each 3     calcium citrate and vitamin D (CITRACAL) 200-250 MG-UNIT TABS per tablet Take 1 tablet by mouth 2 times daily       Calcium Polycarbophil (FIBER) 625 MG tablet Take by mouth every morning       co-enzyme Q-10 30 MG CAPS capsule Take by mouth every morning       fluticasone (FLONASE) 50 MCG/ACT nasal spray SHAKE LIQUID AND USE 1 SPRAY IN EACH NOSTRIL DAILY (Patient taking differently: every evening SHAKE LIQUID AND USE 1 SPRAY IN EACH NOSTRIL DAILY) 16 g 11     loratadine (CLARITIN) 10 MG tablet Take 10 mg by mouth every evening Patient takes at bedtime       magnesium 250 MG tablet Take 1 tablet by mouth every morning        metFORMIN (GLUCOPHAGE XR) 500 MG 24 hr tablet Take 4 tabs every  tablet 3     Misc Natural Products (TART CHERRY ADVANCED PO) Take by mouth every morning       Multiple Vitamins-Minerals (HAIR SKIN NAILS PO) Take by mouth every morning       multivitamin (CENTRUM SILVER) tablet Take 1 tablet by mouth every morning       mycophenolate (GENERIC EQUIVALENT) 250 MG capsule Take 3 capsules (750 mg) by mouth 2 times daily 180 capsule 11     nystatin (MYCOSTATIN) 575301 UNIT/ML suspension Take 5 mLs (500,000 Units) by mouth 4 times daily 90 mL 0     OMEPRAZOLE PO Take 20 mg by mouth every evening       sulfamethoxazole-trimethoprim (BACTRIM) 400-80 MG tablet Take 1 tablet by mouth every morning 90 tablet 3     tacrolimus (GENERIC EQUIVALENT) 0.5 MG capsule Take 1 capsule (0.5 mg) by mouth every evening 90 capsule 3     tacrolimus (GENERIC EQUIVALENT) 1 MG capsule Take 1 capsule (1 mg) by mouth every morning 90 capsule 3     sulfamethoxazole-trimethoprim (BACTRIM) 400-80 MG tablet Take 1 tablet by mouth 2 times daily         Family History  family history includes C.A.D. in his father; Cerebrovascular Disease in his father; Diabetes in his father; Hypertension in his father.    Social History   reports that he quit smoking about 28 years ago. His smoking use included cigarettes. He started smoking about 39 years ago. He has a 20.00 pack-year smoking history. He has never used smokeless tobacco. He reports current alcohol use. He reports that he does not use drugs.     Past Medical History  Past Medical History:   Diagnosis Date     (HFpEF) heart failure with preserved ejection fraction (H)      Allergic rhinitis, cause unspecified      Anemia of chronic kidney failure      Aortic stenosis 08/13/2008    Overview:  Bicuspid aortic valve     AS (aortic stenosis)      Ascending aortic aneurysm      Bicuspid aortic valve      Bilateral hand pain 06/01/2021     CAD (coronary artery disease)      Congestive heart failure,  unspecified      Coronary artery disease involving native artery of transplanted heart without angina pectoris 07/10/2014     Dialysis patient (H)     [facility]     Dyslipidemia      Esophageal reflux      ESRD (end stage renal disease) (H)      Hearing problem      Heart replaced by transplant (H) 12/10/2018     Hypersomnia with sleep apnea, unspecified      Hypertension      Ileostomy status (H)      Immunosuppression (H)      MGUS (monoclonal gammopathy of unknown significance)      Mitral regurgitation      SHEELA (obstructive sleep apnea)     No CPAP     Pneumonia      Sigmoid diverticulitis 08/14/2018     Status post coronary angiogram 06/28/2019     Systolic heart failure (H)      Type 2 diabetes mellitus (H)      Ventral hernia without obstruction or gangrene        Past Surgical History:   Procedure Laterality Date     BIOPSY       CARDIAC SURGERY       COLONOSCOPY N/A 5/3/2018    Procedure: COLONOSCOPY;  colonoscopy ;  Surgeon: Ammon Castillo MD;  [facility]: [facility]     CREATE FISTULA ARTERIOVENOUS UPPER EXTREMITY BOVINE Left 5/8/2019    Procedure: Left Upper Extremity Arteriovenous Bovine Graft Creation;  Surgeon: Calin Cheney MD;  [facility]: [facility]     CV CORONARY ANGIOGRAM N/A 6/28/2019    Procedure: CV CORONARY ANGIOGRAM;  Surgeon: Montrell Posada MD;  [facility]: [facility] CARD[facility]AC [facility] LAB     CV CORONARY ANGIOGRAM N/A 7/15/2020    Procedure: CV CORONARY ANGIOGRAM;  Surgeon: Marcelo Ramírez MD;  [facility]     CV CORONARY ANGIOGRAM N/A 6/7/2021    Procedure: CV CORONARY ANGIOGRAM;  Surgeon: Gilberto Mccann MD;  [facility]     CV CORONARY ANGIOGRAM N/A 6/13/2022    Procedure: Coronary Angiogram;  Surgeon: Marcelo Ramírez MD;  [facility]     CV HEART BIOPSY N/A 2/1/2019    Procedure: HBX;  Surgeon: Montrell Posada MD;  [facility]     CV HEART BIOPSY N/A 3/22/2019    Procedure:  HBX, RIJV ACCESS;  Surgeon: Jordan Fox MD;  Location:  HEART CARDIAC CATH LAB     CV HEART BIOPSY N/A 6/28/2019    Procedure: CV HEART BIOPSY;  Surgeon: Montrell Posada MD;  Location:  HEART CARDIAC CATH LAB     CV HEART BIOPSY N/A 10/28/2019    Procedure: CV HEART BIOPSY;  Surgeon: Marcelo Ramírez MD;  [facility]     CV HEART BIOPSY N/A 2/6/2020    Procedure: CV HEART BIOPSY;  Surgeon: Montrell Posada MD;  Location:  HEART CARDIAC CATH LAB     CV HEART BIOPSY N/A 7/15/2020    Procedure: CV HEART BIOPSY;  Surgeon: Marcelo Ramírez MD;  Location:  HEART CARDIAC CATH LAB     CV RIGHT HEART CATH MEASUREMENTS RECORDED N/A 6/28/2019    Procedure: CV RIGHT HEART CATH;  Surgeon: Montrell Posada MD;  Location:  HEART CARDIAC CATH LAB     CV RIGHT HEART CATH MEASUREMENTS RECORDED N/A 7/15/2020    Procedure: CV RIGHT HEART CATH;  Surgeon: Marcelo Ramírez MD;  Location:  HEART CARDIAC CATH LAB     CV RIGHT HEART CATH MEASUREMENTS RECORDED N/A 6/7/2021    Procedure: CV RIGHT HEART CATH;  Surgeon: Gilberto Mccann MD;  Location:  HEART CARDIAC CATH LAB     CV RIGHT HEART CATH MEASUREMENTS RECORDED N/A 6/13/2022    Procedure: Right Heart Catheterization;  Surgeon: Marcelo Ramírez MD;  Location:  HEART CARDIAC CATH LAB     ENDOSCOPIC ULTRASOUND UPPER GASTROINTESTINAL TRACT (GI) N/A 11/8/2021    Procedure: ENDOSCOPIC ULTRASOUND, ESOPHAGOSCOPY / UPPER GASTROINTESTINAL TRACT (GI)  EUS with FNA;  Surgeon: Alon Don MD;  Location:  OR     ESOPHAGOSCOPY, GASTROSCOPY, DUODENOSCOPY (EGD), COMBINED N/A 5/7/2018    Procedure: COMBINED ENDOSCOPIC ULTRASOUND, ESOPHAGOSCOPY, GASTROSCOPY, DUODENOSCOPY (EGD), FINE NEEDLE ASPIRATE/BIOPSY;  Endoscopic Ultrasound with Fine Needle Aspiration ;  Surgeon: Alon Don MD;  Location:  OR     EXAM UNDER ANESTHESIA RECTUM N/A 8/12/2018    Procedure: EXAM UNDER ANESTHESIA RECTUM;  EXAM UNDER  ANESTHESIA RECTUM ,COMBINED INCISION AND DRAINAGE OF RECTAL ABCESS ;  Surgeon: Rick Tran MD;  [facility]     HERNIORRHAPHY VENTRAL N/A 6/24/2022    Procedure: open mesh repair, incisional ventral hernia;  Surgeon: Shaheed Curtis MD;  [facility]     INCISION AND DRAINAGE RECTUM, COMBINED N/A 8/12/2018    Procedure: COMBINED INCISION AND DRAINAGE RECTUM;;  Surgeon: Rick Tran MD;  [facility]     IR DIALYSIS FISTULOGRAM LEFT  9/25/2019     IR DIALYSIS FISTULOGRAM LEFT  11/22/2019     IR DIALYSIS PTA  9/25/2019     IR DIALYSIS PTA  11/22/2019     LAPAROSCOPIC ASSISTED COLOSTOMY TAKEDOWN N/A 12/11/2018    Procedure: Laparoscopic Assisted Colostomy Takedown, Laparoscopic Lysis of Adhesions;  Surgeon: Rick Tran MD;  [facility]     LAPAROSCOPIC ASSISTED SIGMOID COLECTOMY N/A 8/14/2018    Procedure: LAPAROSCOPIC ASSISTED SIGMOID COLECTOMY;  Laparoscopic Hand Assisted Takedown of Splenic Flexure, Sigmoidectomy, Small Bowel Resection, Takedown of Small Bowel to Colon Fistula;  Surgeon: Rick Tran MD;  [facility]     LAPAROSCOPIC HERNIORRHAPHY INGUINAL BILATERAL Bilateral 7/24/2015    Procedure: LAPAROSCOPIC HERNIORRHAPHY INGUINAL BILATERAL;  Surgeon: Bobby Mcconnell MD;  [facility]     LAPAROSCOPIC INSERTION CATHETER PERITONEAL DIALYSIS N/A 6/22/2017    Procedure: LAPAROSCOPIC INSERTION CATHETER PERITONEAL DIALYSIS;  Laparoscopic Peritoneal Dialysis Catheter Placement - Anesthesia with block;  Surgeon: Esteban Arvizu MD;  [facility]     PICC INSERTION Left 04/22/2018    5Fr - 49cm (3cm external), Basilic vein, low SVC     REMOVE CATHETER PERITONEAL Right 1/15/2018    Procedure: REMOVE CATHETER PERITONEAL;  Open Removal of Peritoneal Dialysis Catheter ;  Surgeon: Esteban Arvizu MD;  [facility]     SIGMOIDOSCOPY FLEXIBLE N/A 11/21/2018    Procedure: Examination Under Anesthesia, Flexible Sigmoidoscopy and  Polypectomy;  Surgeon: Rick Tran MD;  [facility]     SIGMOIDOSCOPY FLEXIBLE N/A 12/11/2018    Procedure: Flexible Sigmoidoscopy;  Surgeon: Rick Tran MD;  [facility]     TAKEDOWN ILEOSTOMY N/A 3/27/2019    Procedure: Takedown Ileostomy;  Surgeon: Rick Tran MD;  [facility]     TRANSPLANT HEART RECIPIENT N/A 6/14/2018    Procedure: TRANSPLANT HEART RECIPIENT;  Median Sternotomy, on-pump oxygenator, Heart Transplant;  Surgeon: Rony Caputo MD;  [facility]     TRANSPLANT KIDNEY RECIPIENT LIVING UNRELATED  12/2019       Physical Exam    No exam today.    RESULTS  Creatinine   Date Value Ref Range Status   01/31/2023 0.98 0.67 - 1.17 mg/dL Final   07/06/2021 1.09 0.66 - 1.25 mg/dL Final     GFR Estimate   Date Value Ref Range Status   01/31/2023 84 >60 mL/min/1.73m2 Final     Comment:     eGFR calculated using 2021 CKD-EPI equation.   07/06/2021 70 >60 mL/min/[1.73_m2] Final     Comment:     Non  GFR Calc  Starting 12/18/2018, serum creatinine based estimated GFR (eGFR) will be   calculated using the Chronic Kidney Disease Epidemiology Collaboration   (CKD-EPI) equation.       Hemoglobin A1C   Date Value Ref Range Status   01/31/2023 6.9 (H) <5.7 % Final     Comment:     Normal <5.7%   Prediabetes 5.7-6.4%    Diabetes 6.5% or higher     Note: Adopted from ADA consensus guidelines.   06/07/2021 6.2 (H) 0 - 5.6 % Final     Comment:     Normal <5.7% Prediabetes 5.7-6.4%  Diabetes 6.5% or higher - adopted from ADA   consensus guidelines.       Potassium   Date Value Ref Range Status   01/31/2023 4.1 3.4 - 5.3 mmol/L Final   08/09/2022 3.7 3.4 - 5.3 mmol/L Final   07/06/2021 3.9 3.4 - 5.3 mmol/L Final     ALT   Date Value Ref Range Status   01/31/2023 22 10 - 50 U/L Final   06/07/2021 28 0 - 70 U/L Final     AST   Date Value Ref Range Status   01/31/2023 27 10 - 50 U/L Final     Comment:     Specimen is hemolyzed which can falsely  elevate AST. Analysis of a non-hemolyzed specimen may result in a lower value.   06/07/2021 27 0 - 45 U/L Final     TSH   Date Value Ref Range Status   09/07/2021 1.89 0.40 - 4.00 mU/L Final   04/16/2018 2.25 0.40 - 4.00 mU/L Final       Cholesterol   Date Value Ref Range Status   01/31/2023 130 <200 mg/dL Final   06/13/2022 114 <200 mg/dL Final   06/07/2021 120 <200 mg/dL Final   12/09/2020 116 <200 mg/dL Final     HDL Cholesterol   Date Value Ref Range Status   06/07/2021 49 >39 mg/dL Final   12/09/2020 47 >39 mg/dL Final     Direct Measure HDL   Date Value Ref Range Status   01/31/2023 56 >=40 mg/dL Final   06/13/2022 45 >=40 mg/dL Final     LDL Cholesterol Calculated   Date Value Ref Range Status   01/31/2023 48 <=100 mg/dL Final   06/13/2022 35 <=100 mg/dL Final   06/07/2021 28 <100 mg/dL Final     Comment:     Desirable:       <100 mg/dl   12/09/2020 26 <100 mg/dL Final     Comment:     Desirable:       <100 mg/dl     Triglycerides   Date Value Ref Range Status   01/31/2023 129 <150 mg/dL Final   06/13/2022 170 (H) <150 mg/dL Final   06/07/2021 215 (H) <150 mg/dL Final     Comment:     Borderline high:  150-199 mg/dl  High:             200-499 mg/dl  Very high:       >499 mg/dl     12/09/2020 214 (H) <150 mg/dL Final     Comment:     Borderline high:  150-199 mg/dl  High:             200-499 mg/dl  Very high:       >499 mg/dl       Cholesterol/HDL Ratio   Date Value Ref Range Status   08/10/2015 5.0 0.0 - 5.0 Final   04/09/2015 4.0 0.0 - 5.0 Final         ASSESSMENT/PLAN:    1.  TYPE 2 DIABETES MELLITUS: Patient's blood sugar values have improved.  Continue current dose of Metformin, healthy eating, portion control and exercise.  Pt seen by [facility] in June 2022 without retinopathy per patient.  No sx of neuropathy and he denies foot ulcers.  /89 at home today.    2.  HX OF ESRD: S/P kidney transplant in Dec 2019.  Most recent creat 0.98 with GFR 84 mL/min in Jan 2023.    3.  CARDIAC: S/P heart transplant in  6/2018.    4.  LIPIDS: LDL 48 in Jan 2023.  Pt taking Lipitor.    5.  FOLLOW UP: With me in 6 months.    Time spent reviewing chart, labs and glucose data today = 6 minutes.  Time for video visit today = 16 minutes.  Time for documentation today = 15 minutes.    Total time for visit today = 37 minutes.    Rosa Calvert PA-C      Answers for HPI/ROS submitted by the patient on 2/9/2023  General Symptoms: No  Skin Symptoms: No  HENT Symptoms: No  EYE SYMPTOMS: No  HEART SYMPTOMS: No  LUNG SYMPTOMS: No  INTESTINAL SYMPTOMS: No  URINARY SYMPTOMS: No  REPRODUCTIVE SYMPTOMS: No  SKELETAL SYMPTOMS: No  BLOOD SYMPTOMS: No  NERVOUS SYSTEM SYMPTOMS: No  MENTAL HEALTH SYMPTOMS: No

## 2023-02-21 ENCOUNTER — LAB (OUTPATIENT)
Dept: LAB | Facility: CLINIC | Age: 68
End: 2023-02-21
Payer: MEDICARE

## 2023-02-21 DIAGNOSIS — Z94.0 KIDNEY REPLACED BY TRANSPLANT: Primary | ICD-10-CM

## 2023-02-21 DIAGNOSIS — Z94.0 KIDNEY REPLACED BY TRANSPLANT: ICD-10-CM

## 2023-02-21 LAB
ANION GAP SERPL CALCULATED.3IONS-SCNC: 13 MMOL/L (ref 7–15)
BUN SERPL-MCNC: 17.6 MG/DL (ref 8–23)
CALCIUM SERPL-MCNC: 9.8 MG/DL (ref 8.8–10.2)
CHLORIDE SERPL-SCNC: 105 MMOL/L (ref 98–107)
CREAT SERPL-MCNC: 0.93 MG/DL (ref 0.67–1.17)
DEPRECATED HCO3 PLAS-SCNC: 21 MMOL/L (ref 22–29)
ERYTHROCYTE [DISTWIDTH] IN BLOOD BY AUTOMATED COUNT: 12.8 % (ref 10–15)
GFR SERPL CREATININE-BSD FRML MDRD: 89 ML/MIN/1.73M2
GLUCOSE SERPL-MCNC: 178 MG/DL (ref 70–99)
HCT VFR BLD AUTO: 37.9 % (ref 40–53)
HGB BLD-MCNC: 12.5 G/DL (ref 13.3–17.7)
MCH RBC QN AUTO: 29.7 PG (ref 26.5–33)
MCHC RBC AUTO-ENTMCNC: 33 G/DL (ref 31.5–36.5)
MCV RBC AUTO: 90 FL (ref 78–100)
PLATELET # BLD AUTO: 196 10E3/UL (ref 150–450)
POTASSIUM SERPL-SCNC: 4.1 MMOL/L (ref 3.4–5.3)
RBC # BLD AUTO: 4.21 10E6/UL (ref 4.4–5.9)
SODIUM SERPL-SCNC: 139 MMOL/L (ref 136–145)
WBC # BLD AUTO: 4.5 10E3/UL (ref 4–11)

## 2023-02-21 PROCEDURE — 85027 COMPLETE CBC AUTOMATED: CPT | Performed by: PATHOLOGY

## 2023-02-21 PROCEDURE — 36415 COLL VENOUS BLD VENIPUNCTURE: CPT | Performed by: PATHOLOGY

## 2023-02-21 PROCEDURE — 80197 ASSAY OF TACROLIMUS: CPT | Performed by: INTERNAL MEDICINE

## 2023-02-21 PROCEDURE — 80048 BASIC METABOLIC PNL TOTAL CA: CPT | Performed by: PATHOLOGY

## 2023-02-22 DIAGNOSIS — Z94.0 KIDNEY REPLACED BY TRANSPLANT: Primary | ICD-10-CM

## 2023-02-22 LAB
TACROLIMUS BLD-MCNC: 7 UG/L (ref 5–15)
TME LAST DOSE: NORMAL H
TME LAST DOSE: NORMAL H

## 2023-02-23 NOTE — PLAN OF CARE
I spoke to Bud/Yamilet re: [facility] claim [id number]. They state claim is not approved yet, but ok to fax C9 for surgery to [facility] at fax # [phone], phene # [phone]. Problem: Goal Outcome Summary  Goal: Goal Outcome Summary  Outcome: No Change  RN: Pt NPO at [facility] for CT this am. Per report, pt aware. Requested not to disturbed at night. No c/o pain or discomfort this shift. Awaken around 0645. CT wants him in litter by 0715. PIV patent and flushes with no problem. VSS except fpr slightly elevated BP and HR (VT WNL). Has scheduled Hydralazine but refused to take claiming he doesn't take anti-HTN on Dialysis  Days but takes the med with him so he can take it with him at Dialysis. Note left for MD to order. Sent to CT at this time. Dialysis today with family providing ride around 1015. Continue with plan of care.

## 2023-03-06 ENCOUNTER — OFFICE VISIT (OUTPATIENT)
Dept: FAMILY MEDICINE | Facility: CLINIC | Age: 68
End: 2023-03-06
Payer: MEDICARE

## 2023-03-06 VITALS
WEIGHT: 181 LBS | TEMPERATURE: 97.3 F | DIASTOLIC BLOOD PRESSURE: 70 MMHG | OXYGEN SATURATION: 100 % | RESPIRATION RATE: 17 BRPM | HEART RATE: 85 BPM | BODY MASS INDEX: 27.43 KG/M2 | SYSTOLIC BLOOD PRESSURE: 102 MMHG | HEIGHT: 68 IN

## 2023-03-06 DIAGNOSIS — N25.81 SECONDARY RENAL HYPERPARATHYROIDISM (H): ICD-10-CM

## 2023-03-06 DIAGNOSIS — Z00.00 ENCOUNTER FOR MEDICARE ANNUAL WELLNESS EXAM: Primary | ICD-10-CM

## 2023-03-06 DIAGNOSIS — I71.21 ANEURYSM OF ASCENDING AORTA WITHOUT RUPTURE (H): ICD-10-CM

## 2023-03-06 DIAGNOSIS — E11.29 TYPE 2 DIABETES MELLITUS WITH OTHER DIABETIC KIDNEY COMPLICATION, WITHOUT LONG-TERM CURRENT USE OF INSULIN (H): ICD-10-CM

## 2023-03-06 DIAGNOSIS — Z94.1 HEART REPLACED BY TRANSPLANT (H): ICD-10-CM

## 2023-03-06 DIAGNOSIS — D84.9 IMMUNOSUPPRESSION (H): ICD-10-CM

## 2023-03-06 PROBLEM — Z29.89 NEED FOR PNEUMOCYSTIS PROPHYLAXIS: Status: RESOLVED | Noted: 2020-06-28 | Resolved: 2023-03-06

## 2023-03-06 PROBLEM — Z98.890 STATUS POST CORONARY ANGIOGRAM: Status: RESOLVED | Noted: 2022-06-13 | Resolved: 2023-03-06

## 2023-03-06 PROCEDURE — G0439 PPPS, SUBSEQ VISIT: HCPCS | Performed by: NURSE PRACTITIONER

## 2023-03-06 PROCEDURE — 99214 OFFICE O/P EST MOD 30 MIN: CPT | Mod: 25 | Performed by: NURSE PRACTITIONER

## 2023-03-06 ASSESSMENT — ACTIVITIES OF DAILY LIVING (ADL): CURRENT_FUNCTION: NO ASSISTANCE NEEDED

## 2023-03-06 ASSESSMENT — ENCOUNTER SYMPTOMS: FREQUENCY: 1

## 2023-03-06 NOTE — PROGRESS NOTES
"SUBJECTIVE:   Murray is a 68 year old who presents for Preventive Visit.  Patient has been advised of split billing requirements and indicates understanding: Yes  Are you in the first 12 months of your Medicare coverage?  No    Healthy Habits:     In general, how would you rate your overall health?  Good    Frequency of exercise:  4-5 days/week    Duration of exercise:  15-30 minutes    Do you usually eat at least 4 servings of fruit and vegetables a day, include whole grains    & fiber and avoid regularly eating high fat or \"junk\" foods?  No    Taking medications regularly:  Yes    Medication side effects:  None    Ability to successfully perform activities of daily living:  No assistance needed    Home Safety:  No safety concerns identified    Hearing Impairment:  Difficulty following a conversation in a noisy restaurant or crowded room and difficulty following dialogue in the theater    In the past 6 months, have you been bothered by leaking of urine?  No    In general, how would you rate your overall mental or emotional health?  Excellent      PHQ-2 Total Score: 0    Additional concerns today:  Yes       Have you ever done Advance Care Planning? (For example, a Health Directive, POLST, or a discussion with a medical provider or your loved ones about your wishes): Yes, advance care planning is on file.       Fall risk  Fallen 2 or more times in the past year?: No  Any fall with injury in the past year?: No  click delete button to remove this line now  Cognitive Screening   1) Repeat 3 items (Leader, Season, Table)    2) Clock draw: NORMAL  3) 3 item recall: Recalls 3 objects  Results: 3 items recalled: COGNITIVE IMPAIRMENT LESS LIKELY    Mini-CogTM Copyright NAMAN Bell. Licensed by the author for use in [facility]; reprinted with permission ([email or web address]). All rights reserved.      Do you have sleep apnea, excessive snoring or daytime drowsiness?: no    Reviewed and updated as needed this visit by " clinical staff                  Reviewed and updated as needed this visit by Provider                 Social History     Tobacco Use     Smoking status: Former     Packs/day: 1.00     Years: 20.00     Pack years: 20.00     Types: Cigarettes     Start date: 1984     Quit date: 1994     Years since quittin.5     Smokeless tobacco: Never   Substance Use Topics     Alcohol use: Yes     Comment: Occasionally         Alcohol Use 3/6/2023   Prescreen: >3 drinks/day or >7 drinks/week? No               Current providers sharing in care for this patient include:   Patient Care Team:  Alicia Shearer APRN CNP as PCP - General (Nurse Practitioner Primary Care)  Arcelia Blum MD as MD (Cardiology)  Kristi Armas PA as Physician Assistant (Physician Assistant)  Jaky Canela as Clinic Care Coordinator  Ana Luisa Mcpherson MD as MD (Nephrology)  Lillie Ulloa RN as Transplant Coordinator  [facility]  Abram Moulton MD as MD (Family Practice)  Eduardo Lea [facility] as Pharmacist (Pharmacist)  Mariangel Merida MD as MD (INTERNAL MEDICINE - ENDOCRINOLOGY, DIABETES & METABOLISM)  Noel Godwin MD as Referring Physician (Otolaryngology)  Dequan De La Paz DO as MD (Neurology)  Shaheed Curtis MD as MD (Surgery)  Giovany Quijano MD as Assigned Nephrology Provider  Giovany Quijano MD as MD (Nephrology)  Alicia Shearer APRN CNP as Assigned PCP  Klever Ernst MD as Assigned Heart and Vascular Provider  Shaheed Curtis MD as Assigned Surgical Provider  Dequan De La Paz DO as Assigned Neuroscience Provider  Rosa Calvert PA-C as Assigned Endocrinology Provider  Abram Moulton MD as Assigned Musculoskeletal Provider    The following health maintenance items are reviewed in Epic and correct as of today:  Health Maintenance   Topic Date Due     EYE EXAM  2018      "MEDICARE ANNUAL WELLNESS VISIT  08/19/2022     DIABETIC FOOT EXAM  08/19/2022     ANNUAL REVIEW OF HM ORDERS  08/22/2022     DTAP/TDAP/TD IMMUNIZATION (3 - Td or Tdap) 02/28/2023     MICROALBUMIN  04/30/2023     COLORECTAL CANCER SCREENING  05/03/2023     A1C  07/31/2023     BMP  08/21/2023     HEMOGLOBIN  08/21/2023     LIPID  01/31/2024     FALL RISK ASSESSMENT  03/06/2024     ADVANCE CARE PLANNING  08/23/2026     PARATHYROID  Completed     PHOSPHORUS  Completed     HEPATITIS C SCREENING  Completed     PHQ-2 (once per calendar year)  Completed     INFLUENZA VACCINE  Completed     Pneumococcal Vaccine: 65+ Years  Completed     URINALYSIS  Completed     ALK PHOS  Completed     ZOSTER IMMUNIZATION  Completed     AORTIC ANEURYSM SCREENING (SYSTEM ASSIGNED)  Completed     COVID-19 Vaccine  Completed     IPV IMMUNIZATION  Aged Out     MENINGITIS IMMUNIZATION  Aged Out     Labs reviewed in EPIC          Review of Systems   HENT: Positive for hearing loss.    Genitourinary: Positive for frequency. Negative for impotence.         OBJECTIVE:   There were no vitals taken for this visit. Estimated body mass index is 26.33 kg/m  as calculated from the following:    Height as of 7/11/22: 1.746 m (5' 8.75\").    Weight as of 12/21/22: 80.3 kg (177 lb).  Physical Exam  GENERAL: healthy, alert and no distress  NECK: no adenopathy, no asymmetry, masses, or scars and thyroid normal to palpation  RESP: lungs clear to auscultation - no rales, rhonchi or wheezes  CV: regular rate and rhythm, normal S1 S2, no S3 or S4, no murmur, click or rub, no peripheral edema and peripheral pulses strong  ABDOMEN: soft, nontender, no hepatosplenomegaly, no masses and bowel sounds normal  MS: no gross musculoskeletal defects noted, no edema  Diabetic foot exam: normal DP and PT pulses, no trophic changes or ulcerative lesions and normal sensory exam        ASSESSMENT / PLAN:   (Z00.00) Encounter for Medicare annual wellness exam  (primary encounter " "diagnosis)  Comment: Reviewed medical/social/family history and health maintenance.  Due for TDAP, recommended obtaining through pharmacy.  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, without long-term current use of insulin (H)  Comment: Well controlled on metformin, follows with endocrinology  Plan:       (I71.21) Aneurysm of ascending aorta without rupture  Comment: Stable, follows with cardiology  Plan:     (N25.81) Secondary renal hyperparathyroidism (H)  Comment: History of, follows with endocrinology  Plan:     Patient has been advised of split billing requirements and indicates understanding: Yes      COUNSELING:  Reviewed preventive health counseling, as reflected in patient instructions      BMI:   Estimated body mass index is 26.33 kg/m  as calculated from the following:    Height as of 7/11/22: 1.746 m (5' 8.75\").    Weight as of 12/21/22: 80.3 kg (177 lb).         He reports that he quit smoking about 28 years ago. His smoking use included cigarettes. He started smoking about 39 years ago. He has a 20.00 pack-year smoking history. He has never used smokeless tobacco.      Appropriate preventive services were discussed with this patient, including applicable screening as appropriate for cardiovascular disease, diabetes, osteopenia/osteoporosis, and glaucoma.  As appropriate for age/gender, discussed screening for colorectal cancer, prostate cancer, breast cancer, and cervical cancer. Checklist reviewing preventive services available has been given to the patient.    Reviewed patients plan of care and provided an AVS. The Complex Care Plan (for patients with higher acuity and needing more deliberate coordination of services) for Murray meets the Care Plan requirement. This Care Plan has been " established and reviewed with the Patient.          VERONICA Muse CNP [facility]    Identified Health Risks:

## 2023-03-06 NOTE — PATIENT INSTRUCTIONS
Patient Education   Personalized Prevention Plan  You are due for the preventive services outlined below.  Your care team is available to assist you in scheduling these services.  If you have already completed any of these items, please share that information with your care team to update in your medical record.  Health Maintenance Due   Topic Date Due     Eye Exam  06/06/2018     Annual Wellness Visit  08/19/2022     Diabetic Foot Exam  08/19/2022     ANNUAL REVIEW OF HM ORDERS  08/22/2022     Diptheria Tetanus Pertussis (DTAP/TDAP/TD) Vaccine (3 - Td or Tdap) 02/28/2023

## 2023-03-08 ENCOUNTER — MYC MEDICAL ADVICE (OUTPATIENT)
Dept: TRANSPLANT | Facility: CLINIC | Age: 68
End: 2023-03-08
Payer: MEDICARE

## 2023-03-08 DIAGNOSIS — E55.9 VITAMIN D DEFICIENCY: ICD-10-CM

## 2023-03-08 DIAGNOSIS — Z94.0 KIDNEY REPLACED BY TRANSPLANT: Primary | ICD-10-CM

## 2023-03-08 NOTE — TELEPHONE ENCOUNTER
Giovany Quijano MD Sveiven, Colette, RN  Please check the following labs: Vitamin D and Magnesium.     Would also recommend repeating SPEP in March.     Jake     OUTCOME: patient obtaining labs next week    Orders placed    Colette Martin RN   Transplant Coordinator  [phone]

## 2023-03-14 ENCOUNTER — LAB (OUTPATIENT)
Dept: LAB | Facility: CLINIC | Age: 68
End: 2023-03-14
Payer: MEDICARE

## 2023-03-14 DIAGNOSIS — E55.9 VITAMIN D DEFICIENCY: ICD-10-CM

## 2023-03-14 DIAGNOSIS — Z94.0 KIDNEY REPLACED BY TRANSPLANT: ICD-10-CM

## 2023-03-14 LAB
ANION GAP SERPL CALCULATED.3IONS-SCNC: 12 MMOL/L (ref 7–15)
BUN SERPL-MCNC: 12.6 MG/DL (ref 8–23)
CALCIUM SERPL-MCNC: 9.8 MG/DL (ref 8.8–10.2)
CHLORIDE SERPL-SCNC: 103 MMOL/L (ref 98–107)
CREAT SERPL-MCNC: 0.92 MG/DL (ref 0.67–1.17)
DEPRECATED CALCIDIOL+CALCIFEROL SERPL-MC: 60 UG/L (ref 20–75)
DEPRECATED HCO3 PLAS-SCNC: 23 MMOL/L (ref 22–29)
ERYTHROCYTE [DISTWIDTH] IN BLOOD BY AUTOMATED COUNT: 12.7 % (ref 10–15)
GFR SERPL CREATININE-BSD FRML MDRD: >90 ML/MIN/1.73M2
GLUCOSE SERPL-MCNC: 235 MG/DL (ref 70–99)
HCT VFR BLD AUTO: 39.7 % (ref 40–53)
HGB BLD-MCNC: 13.3 G/DL (ref 13.3–17.7)
MAGNESIUM SERPL-MCNC: 1.6 MG/DL (ref 1.7–2.3)
MCH RBC QN AUTO: 29.6 PG (ref 26.5–33)
MCHC RBC AUTO-ENTMCNC: 33.5 G/DL (ref 31.5–36.5)
MCV RBC AUTO: 88 FL (ref 78–100)
PLATELET # BLD AUTO: 218 10E3/UL (ref 150–450)
POTASSIUM SERPL-SCNC: 3.9 MMOL/L (ref 3.4–5.3)
RBC # BLD AUTO: 4.49 10E6/UL (ref 4.4–5.9)
SODIUM SERPL-SCNC: 138 MMOL/L (ref 136–145)
TACROLIMUS BLD-MCNC: 6.1 UG/L (ref 5–15)
TME LAST DOSE: NORMAL H
TME LAST DOSE: NORMAL H
TOTAL PROTEIN SERUM FOR ELP: 7.1 G/DL (ref 6.4–8.3)
WBC # BLD AUTO: 4.5 10E3/UL (ref 4–11)

## 2023-03-14 PROCEDURE — 84165 PROTEIN E-PHORESIS SERUM: CPT | Mod: 26

## 2023-03-14 PROCEDURE — 84165 PROTEIN E-PHORESIS SERUM: CPT | Mod: TC | Performed by: PATHOLOGY

## 2023-03-14 PROCEDURE — 84155 ASSAY OF PROTEIN SERUM: CPT | Performed by: INTERNAL MEDICINE

## 2023-03-14 PROCEDURE — 83735 ASSAY OF MAGNESIUM: CPT | Performed by: PATHOLOGY

## 2023-03-14 PROCEDURE — 36415 COLL VENOUS BLD VENIPUNCTURE: CPT | Performed by: PATHOLOGY

## 2023-03-14 PROCEDURE — 80048 BASIC METABOLIC PNL TOTAL CA: CPT | Performed by: PATHOLOGY

## 2023-03-14 PROCEDURE — 82306 VITAMIN D 25 HYDROXY: CPT | Performed by: INTERNAL MEDICINE

## 2023-03-14 PROCEDURE — 85027 COMPLETE CBC AUTOMATED: CPT | Performed by: PATHOLOGY

## 2023-03-14 PROCEDURE — 80197 ASSAY OF TACROLIMUS: CPT | Performed by: INTERNAL MEDICINE

## 2023-03-15 LAB
ALBUMIN SERPL ELPH-MCNC: 4.2 G/DL (ref 3.7–5.1)
ALPHA1 GLOB SERPL ELPH-MCNC: 0.2 G/DL (ref 0.2–0.4)
ALPHA2 GLOB SERPL ELPH-MCNC: 0.8 G/DL (ref 0.5–0.9)
B-GLOBULIN SERPL ELPH-MCNC: 0.7 G/DL (ref 0.6–1)
GAMMA GLOB SERPL ELPH-MCNC: 1.1 G/DL (ref 0.7–1.6)
M PROTEIN SERPL ELPH-MCNC: 0.3 G/DL
PROT PATTERN SERPL ELPH-IMP: ABNORMAL

## 2023-04-02 DIAGNOSIS — Z94.0 KIDNEY REPLACED BY TRANSPLANT: Primary | ICD-10-CM

## 2023-04-02 RX ORDER — MYCOPHENOLATE MOFETIL 250 MG/1
750 CAPSULE ORAL 2 TIMES DAILY
Qty: 180 CAPSULE | Refills: 11 | Status: SHIPPED | OUTPATIENT
Start: 2023-04-02 | End: 2023-06-23

## 2023-04-07 ENCOUNTER — MYC MEDICAL ADVICE (OUTPATIENT)
Dept: FAMILY MEDICINE | Facility: CLINIC | Age: 68
End: 2023-04-07
Payer: MEDICARE

## 2023-04-11 ENCOUNTER — LAB (OUTPATIENT)
Dept: LAB | Facility: CLINIC | Age: 68
End: 2023-04-11
Payer: MEDICARE

## 2023-04-11 DIAGNOSIS — Z94.0 KIDNEY REPLACED BY TRANSPLANT: ICD-10-CM

## 2023-04-11 LAB
ALBUMIN MFR UR ELPH: 17.2 MG/DL (ref 1–14)
ANION GAP SERPL CALCULATED.3IONS-SCNC: 11 MMOL/L (ref 7–15)
BUN SERPL-MCNC: 21 MG/DL (ref 8–23)
CALCIUM SERPL-MCNC: 9.9 MG/DL (ref 8.8–10.2)
CHLORIDE SERPL-SCNC: 104 MMOL/L (ref 98–107)
CREAT SERPL-MCNC: 1.15 MG/DL (ref 0.67–1.17)
CREAT UR-MCNC: 144 MG/DL
DEPRECATED HCO3 PLAS-SCNC: 23 MMOL/L (ref 22–29)
ERYTHROCYTE [DISTWIDTH] IN BLOOD BY AUTOMATED COUNT: 12.6 % (ref 10–15)
GFR SERPL CREATININE-BSD FRML MDRD: 69 ML/MIN/1.73M2
GLUCOSE SERPL-MCNC: 211 MG/DL (ref 70–99)
HCT VFR BLD AUTO: 38.1 % (ref 40–53)
HGB BLD-MCNC: 12.9 G/DL (ref 13.3–17.7)
MCH RBC QN AUTO: 29.9 PG (ref 26.5–33)
MCHC RBC AUTO-ENTMCNC: 33.9 G/DL (ref 31.5–36.5)
MCV RBC AUTO: 88 FL (ref 78–100)
PLATELET # BLD AUTO: 207 10E3/UL (ref 150–450)
POTASSIUM SERPL-SCNC: 4.2 MMOL/L (ref 3.4–5.3)
PROT/CREAT 24H UR: 0.12 MG/MG CR (ref 0–0.2)
RBC # BLD AUTO: 4.31 10E6/UL (ref 4.4–5.9)
SODIUM SERPL-SCNC: 138 MMOL/L (ref 136–145)
TACROLIMUS BLD-MCNC: 5.5 UG/L (ref 5–15)
TME LAST DOSE: NORMAL H
TME LAST DOSE: NORMAL H
WBC # BLD AUTO: 4.4 10E3/UL (ref 4–11)

## 2023-04-11 PROCEDURE — 84156 ASSAY OF PROTEIN URINE: CPT | Performed by: PATHOLOGY

## 2023-04-11 PROCEDURE — 85027 COMPLETE CBC AUTOMATED: CPT | Performed by: PATHOLOGY

## 2023-04-11 PROCEDURE — 80048 BASIC METABOLIC PNL TOTAL CA: CPT | Performed by: PATHOLOGY

## 2023-04-11 PROCEDURE — 36415 COLL VENOUS BLD VENIPUNCTURE: CPT | Performed by: PATHOLOGY

## 2023-04-11 PROCEDURE — 80197 ASSAY OF TACROLIMUS: CPT | Performed by: INTERNAL MEDICINE

## 2023-04-11 NOTE — TELEPHONE ENCOUNTER
Alicia - Patient is concerned because Vitamin D and Magnesium are both low.  Also is up multiple times a night to urinate.  Ashlie Lopez RN  [facility]

## 2023-04-11 NOTE — TELEPHONE ENCOUNTER
Vitamin D levels were 60 which is great!  I like to see the levels around 50.  I would follow up with nephrology who ordered the labs regarding mag supplementation.  Would be reasonable to add in a daily supplement.    As far as increased urination, we could consider flomax, but I would want to get more information.  Would recommend virtual visit at the very least.

## 2023-04-14 ENCOUNTER — MYC MEDICAL ADVICE (OUTPATIENT)
Dept: FAMILY MEDICINE | Facility: CLINIC | Age: 68
End: 2023-04-14
Payer: MEDICARE

## 2023-04-14 DIAGNOSIS — Z12.11 SPECIAL SCREENING FOR MALIGNANT NEOPLASMS, COLON: Primary | ICD-10-CM

## 2023-04-14 NOTE — TELEPHONE ENCOUNTER
I do not manage Arthur.  At this time he is UTD on his COVID boosters as long as he has had his BiValent.  I expect there will be another booster in the fall.  He is due for his TDAP which can be done at the pharmacy.  I will order his colonoscopy as he is due in May.

## 2023-04-14 NOTE — TELEPHONE ENCOUNTER
Alicia,    Is he suppose to get an Evushield again? Do you manage that?    He is also asking about Covid booster . Is this any different for him due to his kidney transplant status?    Shauna Clemons RN, BSN  [facility]

## 2023-04-19 ENCOUNTER — TELEPHONE (OUTPATIENT)
Dept: GASTROENTEROLOGY | Facility: CLINIC | Age: 68
End: 2023-04-19
Payer: MEDICARE

## 2023-04-19 NOTE — TELEPHONE ENCOUNTER
Screening Questions  BLUE  KIND OF PREP RED  LOCATION [review exclusion criteria] GREEN  SEDATION TYPE        y Are you active on mychart?       Manfred Ordering/Referring Provider?        Medicare What type of coverage do you have?      n Have you had a positive covid test in the last 14 days?     26.6 1. BMI  [BMI 40+ - review exclusion criteria& smart-phrase document]    y  2. Are you able to give consent for your medical care? [IF NO,RN REVIEW]          n  3. Are you taking any prescription pain medications on a routine schedule   (ex narcotics: oxycodone, roxicodone, oxycontin,  and percocet)? [RN Review]        n  3a. EXTENDED PREP What kind of prescription?     n 4. Do you have any chemical dependencies such as alcohol, street drugs, or methadone?        **If yes 3- 5 , please schedule with MAC sedation.**          IF YES TO ANY 6 - 10 - HOSPITAL SETTING ONLY.     n 6.   Do you need assistance transferring?     y 7.   Have you had a heart or lung transplant?    n 8.   Are you currently on dialysis?   n 9.   Do you use daily home oxygen?   n 10. Do you take nitroglycerin?   10a. n If yes, how often?     11. [FEMALES]   Are you currently pregnant?    11a.  If yes, how many weeks? [ Greater than 12 weeks, OR NEEDED]    n 12. Do you have Pulmonary Hypertension? *NEED PAC APPT AT [facility] w/ MAC*     n 13. [review exclusion criteria]  Do you have any implantable devices in your body (pacemaker, defib, LVAD)?    n 14. In the past 6 months, have you had any heart related issues including cardiomyopathy or heart attack?     14a. n If yes, did it require cardiac stenting if so when?     n 15. Have you had a stroke or Transient ischemic attack (TIA - aka  mini stroke ) within 6 months?      n 16. Do you have mod to severe Obstructive Sleep Apnea?  [Hospital only]    n 17. Do you have SEVERE AND UNCONTROLLED asthma? *NEED PAC APPT AT [facility] w/MAC*     18. Are you currently taking any blood thinners?     18a. No. Continue  "to 19.   18b.     n 19. Do you take the medication Phentermine?    19a. If yes, \"Hold for 7 days before procedure.  Please consult your prescribing provider if you have questions about holding this medication.\"     n  20. Do you have chronic kidney disease?      y  21. Do you have a diagnosis of diabetes?     n  22. On a regular basis do you go 3-5 days between bowel movements?      23. Preferred LOCAL Pharmacy for Pre Prescription    [ LIST ONLY ONE PHARMACY]          [id number] - [place], [place] - [address] AT [facility]        - CLOSING REMINDERS -    Informed patient they will need an adult    Cannot take any type of public or medical transportation alone    Conscious Sedation- Needs  for 6 hours after the procedure       MAC/General-Needs  for 24 hours after procedure    Pre-Procedure Covid test to be completed [[facility] PCR Testing Required]    Confirmed Nurse will call to complete assessment       - SCHEDULING DETAILS -  y Hospital Setting Required? If yes, what is the exclusion?: transplant   Bland  Surgeon    5/26  Date of Procedure  Lower Endoscopy [Colonoscopy]  Type of Procedure Scheduled  [facility]- If you answer yes to questions #8, #20, #21 [  pts ]Which Colonoscopy Prep was Sent?     CS Sedation Type     n PAC / Pre-op Required                 "

## 2023-04-21 ENCOUNTER — OFFICE VISIT (OUTPATIENT)
Dept: URGENT CARE | Facility: URGENT CARE | Age: 68
End: 2023-04-21
Payer: MEDICARE

## 2023-04-21 ENCOUNTER — ANCILLARY PROCEDURE (OUTPATIENT)
Dept: GENERAL RADIOLOGY | Facility: CLINIC | Age: 68
End: 2023-04-21
Attending: FAMILY MEDICINE
Payer: MEDICARE

## 2023-04-21 VITALS
HEIGHT: 68 IN | TEMPERATURE: 98 F | SYSTOLIC BLOOD PRESSURE: 117 MMHG | HEART RATE: 91 BPM | BODY MASS INDEX: 27.03 KG/M2 | RESPIRATION RATE: 17 BRPM | OXYGEN SATURATION: 100 % | WEIGHT: 178.38 LBS | DIASTOLIC BLOOD PRESSURE: 81 MMHG

## 2023-04-21 DIAGNOSIS — J06.9 UPPER RESPIRATORY TRACT INFECTION, UNSPECIFIED TYPE: ICD-10-CM

## 2023-04-21 DIAGNOSIS — J22 LRTI (LOWER RESPIRATORY TRACT INFECTION): Primary | ICD-10-CM

## 2023-04-21 PROCEDURE — U0005 INFEC AGEN DETEC AMPLI PROBE: HCPCS | Performed by: FAMILY MEDICINE

## 2023-04-21 PROCEDURE — 99214 OFFICE O/P EST MOD 30 MIN: CPT | Mod: CS | Performed by: FAMILY MEDICINE

## 2023-04-21 PROCEDURE — U0003 INFECTIOUS AGENT DETECTION BY NUCLEIC ACID (DNA OR RNA); SEVERE ACUTE RESPIRATORY SYNDROME CORONAVIRUS 2 (SARS-COV-2) (CORONAVIRUS DISEASE [COVID-19]), AMPLIFIED PROBE TECHNIQUE, MAKING USE OF HIGH THROUGHPUT TECHNOLOGIES AS DESCRIBED BY CMS-2020-01-R: HCPCS | Performed by: FAMILY MEDICINE

## 2023-04-21 PROCEDURE — 71046 X-RAY EXAM CHEST 2 VIEWS: CPT | Mod: TC | Performed by: RADIOLOGY

## 2023-04-21 RX ORDER — AZITHROMYCIN 250 MG/1
TABLET, FILM COATED ORAL
Qty: 6 TABLET | Refills: 0 | Status: SHIPPED | OUTPATIENT
Start: 2023-04-21 | End: 2023-04-26

## 2023-04-21 NOTE — PATIENT INSTRUCTIONS
No pneumonia on xray    Give I heard some abnormal lung sounds when listening I will treat with azithromycin antibiotic for 5 days.    Your COVID test will return within 24 hours.

## 2023-04-21 NOTE — PROGRESS NOTES
"Assessment & Plan     LRTI (lower respiratory tract infection)  Based on exam with the abn lung sounds on right. CXR neg, VSS, afebrile  - azithromycin (ZITHROMAX) 250 MG tablet  Dispense: 6 tablet; Refill: 0             No follow-ups on file.    Miah Marquez MD  [facility] [place]    Dino Gao is a 68 year old male who presents to clinic today for the following health issues:  Chief Complaint   Patient presents with     Headache     X 2 days      Nasal Congestion     X 5 days, last day pt has had nasal drainage     Urgent Care     For the last 5 days pt has been coughing up phlegm, chest congestion     Ear Problem     X1 day ears have been popping      HPI    Here with concern about chest congestion.  Headache  Nasal blowing nose  Ears popping.  Transplant patient.  No fever.  mucinex helped some.    Heat/kidney transplant.          Review of Systems        Objective    /81 (BP Location: Right arm, Patient Position: Sitting, Cuff Size: Adult Large)   Pulse 91   Temp 98  F (36.7  C) (Oral)   Resp 17   Ht 1.72 m (5' 7.72\")   Wt 80.9 kg (178 lb 6 oz)   SpO2 100%   BMI 27.35 kg/m    Physical Exam  Vitals and nursing note reviewed.   Constitutional:       Appearance: Normal appearance.   HENT:      Right Ear: Tympanic membrane normal.      Left Ear: Tympanic membrane normal.      Mouth/Throat:      Mouth: Mucous membranes are moist.   Eyes:      Pupils: Pupils are equal, round, and reactive to light.   Cardiovascular:      Rate and Rhythm: Normal rate and regular rhythm.      Pulses: Normal pulses.      Heart sounds: Normal heart sounds.   Pulmonary:      Effort: Pulmonary effort is normal.      Comments: Squeaky in right chest on auscultation  Musculoskeletal:      Cervical back: Neck supple.   Neurological:      Mental Status: He is alert.                    "

## 2023-04-22 LAB — SARS-COV-2 RNA RESP QL NAA+PROBE: NEGATIVE

## 2023-04-25 ENCOUNTER — VIRTUAL VISIT (OUTPATIENT)
Dept: FAMILY MEDICINE | Facility: CLINIC | Age: 68
End: 2023-04-25
Payer: MEDICARE

## 2023-04-25 DIAGNOSIS — N18.30 ANEMIA IN STAGE 3 CHRONIC KIDNEY DISEASE, UNSPECIFIED WHETHER STAGE 3A OR 3B CKD (H): ICD-10-CM

## 2023-04-25 DIAGNOSIS — N32.81 OVERACTIVE BLADDER: Primary | ICD-10-CM

## 2023-04-25 DIAGNOSIS — Z94.0 KIDNEY REPLACED BY TRANSPLANT: ICD-10-CM

## 2023-04-25 DIAGNOSIS — J06.9 UPPER RESPIRATORY TRACT INFECTION, UNSPECIFIED TYPE: ICD-10-CM

## 2023-04-25 DIAGNOSIS — D63.1 ANEMIA IN STAGE 3 CHRONIC KIDNEY DISEASE, UNSPECIFIED WHETHER STAGE 3A OR 3B CKD (H): ICD-10-CM

## 2023-04-25 DIAGNOSIS — R53.83 OTHER FATIGUE: ICD-10-CM

## 2023-04-25 PROCEDURE — 99215 OFFICE O/P EST HI 40 MIN: CPT | Mod: VID | Performed by: NURSE PRACTITIONER

## 2023-04-25 RX ORDER — BLOOD SUGAR DIAGNOSTIC
STRIP MISCELLANEOUS
Qty: 300 STRIP | Refills: 3 | Status: CANCELLED | OUTPATIENT
Start: 2023-04-25

## 2023-04-25 RX ORDER — LANCETS
EACH MISCELLANEOUS
Qty: 300 EACH | Refills: 3 | Status: CANCELLED | OUTPATIENT
Start: 2023-04-25

## 2023-04-25 NOTE — PROGRESS NOTES
Murray is a 68 year old who is being evaluated via a billable video visit.      How would you like to obtain your AVS? MyChart  If the video visit is dropped, the invitation should be resent by: Text to cell phone: [phone]  Will anyone else be joining your video visit? No          Assessment & Plan     Overactive bladder  Symptoms most consistent with overactive bladder.  He has no hesitancy and feels he empties his bladder completely.  We discussed with decrease bladder volume when on dialysis, this can contribute to overactive bladder.  Recommended trying to increase time intervals between voids to let bladder stretch a little more.  Can also decrease fluids closer to bedtime.  He is open to pelvic floor therapy.    - Physical Therapy Referral; Future    Kidney replaced by transplant  Reviewed recent labs which are stable, continues to follow with nephrology    Other fatigue  Feeling better with hydration and magnesium supplement.  We discussed his anemia could also be contributing and I recommended iron supplement.    Anemia in stage 3 chronic kidney disease, unspecified whether stage 3a or 3b CKD (H)  See above    Upper respiratory tract infection, unspecified type  Improved since UC visit and antibiotics, continue mucinex as needed.        Review of external notes as documented elsewhere in note  Ordering of each unique test  Prescription drug management  40 minutes spent by me on the date of the encounter doing chart review, history and exam, documentation and further activities per the note           VERONICA Muse [facility]    Subjective   Murray is a 68 year old, presenting for the following health issues:  Medication Request (Discuss Flomax), UC Follow-Up (Currently taking azithromycin (ZITHROMAX) 250 MG tablet), and Urinary Frequency        4/25/2023    10:47 AM   Additional Questions   Roomed by Genie TILLMAN     History of Present Illness       Reason for visit:   Frequent Urination  Symptom onset:  More than a month  Symptoms include:  Get up in middle of night frequently  Symptom intensity:  Moderate  Symptom progression:  Staying the same  Had these symptoms before:  Yes  Has tried/received treatment for these symptoms:  No  What makes it worse:  DK  What makes it better:  Yeah a full nights sleep    He eats 2-3 servings of fruits and vegetables daily.He consumes 0 sweetened beverage(s) daily.He exercises with enough effort to increase his heart rate 10 to 19 minutes per day.  He exercises with enough effort to increase his heart rate 5 days per week.   He is taking medications regularly.     Went into UC on Friday, started azithromycin.  Also taking Mucinex.    Feeling better.    Low energy.    Started mag supplement, increasing minerals.  Feeling really good now.        Still having nocturia.  Sometimes up 3-4 times a night.  Checked his sleep patterns on his watch.  Watch has a snoring sensor.  Not concerned about that.  Urination is the biggest issue.  Has a sensation which makes his start thinking about it.    Previously on dialysis, but still made urine.      Review of Systems   Constitutional, HEENT, cardiovascular, pulmonary, gi and gu systems are negative, except as otherwise noted.      Objective    Vitals - Patient Reported  Systolic (Patient Reported): 139  Diastolic (Patient Reported): (!) 105  Pain Score: No Pain (0)        Physical Exam   GENERAL: Healthy, alert and no distress  EYES: Eyes grossly normal to inspection.  No discharge or erythema, or obvious scleral/conjunctival abnormalities.  RESP: No audible wheeze, cough, or visible cyanosis.  No visible retractions or increased work of breathing.    SKIN: Visible skin clear. No significant rash, abnormal pigmentation or lesions.  NEURO: Cranial nerves grossly intact.  Mentation and speech appropriate for age.  PSYCH: Mentation appears normal, affect normal/bright, judgement and insight intact, normal  speech and appearance well-groomed.                Video-Visit Details    Type of service:  Video Visit   Video Start Time: 1059  Video End Time:1130    Originating Location (pt. Location): Home    Distant Location (provider location):  Off-site  Platform used for Video Visit: Morgan

## 2023-05-11 ENCOUNTER — TELEPHONE (OUTPATIENT)
Dept: GASTROENTEROLOGY | Facility: CLINIC | Age: 68
End: 2023-05-11

## 2023-05-11 DIAGNOSIS — Z12.11 ENCOUNTER FOR SCREENING COLONOSCOPY: Primary | ICD-10-CM

## 2023-05-11 RX ORDER — BISACODYL 5 MG/1
TABLET, DELAYED RELEASE ORAL
Qty: 4 TABLET | Refills: 0 | Status: SHIPPED | OUTPATIENT
Start: 2023-05-11 | End: 2023-06-19

## 2023-05-11 NOTE — TELEPHONE ENCOUNTER
Patient scheduled for Colonoscopy  on 5/26/2023.     Discuss Covid policy.     Pre op exam needed? N/A    Arrival time: 1345. Procedure time 1445    Facility location: [facility]; [address] [place] [address], [address] Floor, [place] [address]    Sedation type: Conscious sedation     NSAIDs? verify    Anticoagulations? No    Electronic implanted devices? No    Diabetic? Oral medications.  Patient to hold oral diabetic medications day of procedure    Indication for procedure: screening    Bowel prep recommendation: Standard Golytely     Prep instructions sent via [employer]     Bowel prep script sent to    [id number] - [place], [place] - [address] AT [facility]    Pre visit planning completed.    Edith Sahu RN  Endoscopy Procedure Pre Assessment RN

## 2023-05-11 NOTE — TELEPHONE ENCOUNTER
Pre assessment questions completed for upcoming Colonoscopy  procedure scheduled on 5/26/2023    COVID policy reviewed.     Reviewed procedural arrival time 1345 and facility location [facility]; [address] [facility], [address], [place] [address]    Designated  policy reviewed. Instructed to have someone stay 6 hours post procedure.     NSAIDs? No    Anticoagulation/blood thinners? No    Electronic implanted devices? No    Diabetic? Yes. Oral medications.  Patient to hold oral diabetic medications day of procedure- Metformin    Pt to HOLD Iron x 7 days prior to procedure    Reviewed procedure prep instructions.     Patient verbalized understanding and had no questions or concerns at this time.    Edith Sahu RN  Endoscopy Procedure Pre Assessment RN

## 2023-05-17 ENCOUNTER — OFFICE VISIT (OUTPATIENT)
Dept: AUDIOLOGY | Facility: CLINIC | Age: 68
End: 2023-05-17
Payer: MEDICARE

## 2023-05-17 DIAGNOSIS — H90.3 ASYMMETRIC SNHL (SENSORINEURAL HEARING LOSS): Primary | ICD-10-CM

## 2023-05-17 PROCEDURE — 99207 PR ASSESSMENT FOR HEARING AID: CPT | Performed by: AUDIOLOGIST

## 2023-05-17 NOTE — PROGRESS NOTES
AUDIOLOGY REPORT    BACKGROUND INFORMATION: Murray Nicholson was seen in the Audiology Clinic at [facility] on 5/17/2023 for follow-up. Previous results have revealed a bilateral asymmetric sensorineural hearing loss (essentially normal hearing in the right ear). The patient has been seen previously in this clinic and was fit with a right Phonak Audeo P30R hearing aid on the right ear and a Phonak CROS P transmitter on the left ear on 1/13/2022. The patient reports that he is experiencing issues hearing in background noise (loud restaurants).  He feels like he would like more volume in the hearing aid side. He also received information regarding a cochlear implant and is interested in the Fwd: Power 2 device.    TEST RESULTS AND PROCEDURES: A hearing aid check was performed. The hearing devices were thoroughly cleaned and a listening check indicated that the devices sounded crisp and clear with no distortion or weakness noted.  Adjustments were made including increasing overall gain by about 3-4 dB, a speech in noise program was also added for him to use.  A review of the button functions was performed. He expressed understanding.    The hearing devices were paired with the phone dago and a brief tutorial was performed. He expressed understanding regarding dago use.    Lastly, a cochlear implant for the left ear was briefly reviewed. He currently has Medicare for insurance and so it was discussed that unfortunately he does not meet the insurance requirements that states that a patient needs to have a moderate to profound hearing loss bilaterally. He would be welcome to return for a hearing test to monitor hearing status to be sure, but at the last hearing test he had essentially normal hearing in the right ear. He expressed understanding.     SUMMARY AND RECOMMENDATIONS: A hearing aid check was performed today.  Today's appointment is a no charge visit as the hearing aids are  Per MD ok to change to CF 80 mg/0.8 mL pen #2 per 28 days due to [place] denial of 40 mg/0.4 mL #4 for 28 day.   Forwarding to Initial.      under warranty. Adjustments were made as noted above and the patient will return as needed or at least every 8-10 months for cleaning and assessment of hearing aid.  Call this clinic with questions regarding today s visit.        Nehal Christopher  Audiologist  [place] License  #[id number]

## 2023-05-24 ENCOUNTER — ANCILLARY PROCEDURE (OUTPATIENT)
Dept: MRI IMAGING | Facility: CLINIC | Age: 68
End: 2023-05-24
Attending: INTERNAL MEDICINE
Payer: MEDICARE

## 2023-05-24 DIAGNOSIS — K86.2 PANCREATIC CYST: ICD-10-CM

## 2023-05-24 PROCEDURE — G1010 CDSM STANSON: HCPCS | Performed by: RADIOLOGY

## 2023-05-24 PROCEDURE — A9585 GADOBUTROL INJECTION: HCPCS | Performed by: RADIOLOGY

## 2023-05-24 PROCEDURE — 74183 MRI ABD W/O CNTR FLWD CNTR: CPT | Mod: MG | Performed by: RADIOLOGY

## 2023-05-24 RX ORDER — GADOBUTROL 604.72 MG/ML
10 INJECTION INTRAVENOUS ONCE
Status: COMPLETED | OUTPATIENT
Start: 2023-05-24 | End: 2023-05-24

## 2023-05-24 RX ADMIN — GADOBUTROL 8.5 ML: 604.72 INJECTION INTRAVENOUS at 15:20

## 2023-05-24 NOTE — DISCHARGE INSTRUCTIONS
MRI Contrast Discharge Instructions    The IV contrast you received today will pass out of your body in your  urine. This will happen in the next 24 hours. You will not feel this process.  Your urine will not change color.    Drink at least 4 extra glasses of water or juice today (unless your doctor  has restricted your fluids). This reduces the stress on your kidneys.  You may take your regular medicines.    If you are on dialysis: It is best to have dialysis today.    If you have a reaction: Most reactions happen right away. If you have  any new symptoms after leaving the hospital (such as hives or swelling),  call your hospital at the correct number below. Or call your family doctor.  If you have breathing distress or wheezing, call 911.    Special instructions: ***    I have read and understand the above information.    Signature:______________________________________ Date:___________    Staff:__________________________________________ Date:___________     Time:__________    [facility] Departments:    ___[place]: [phone]  ___[facility]: [phone]  ___[place]: [phone] ___[facility]: [phone]  ___[facility]: [phone]  ___[facility]: [phone]  ___[phone]  ___[facility]: [phone]  ___[phone]

## 2023-05-24 NOTE — LETTER
Patient:  Murray Nicholson  :   1955  MRN:     [id number]        Mr.Emil SANA Nicholson  [address]  [place] [address]        May 26, 2023    Dear ,    We are writing to inform you of your test results. Similar to previous, you have multiple pancreatic cysts, one of which was previously sampled and consistent with side branched intraductal papillary mucinous neoplasms. We have been following these by MRI and for the most part, good news. There are no significant concerning findings, though one of the smaller cysts remains 18mm in size. Therefore, this requires ongoing close surveillance, with an other MRI/MRCP in one year. Our team will organize this study.     I have included the formal documtentation of the results below. It continues to be a pleasure participating in your care.  Please feel free to contact our clinic with any further questions.      Sincerely,    Alon Don MD PhD FACMARIZOL ZAMUDIO  Professor of Medicine, Surgery and Pediatrics  Interventional and Therapeutic Endoscopy  Chief, Division of Gastroenterology and Hepatology and Nutrition  GI Service     [facility] [address]  [address] [place] [id number]    New Consultations  [phone]  Procedure Scheduling  [phone]  Clinical Nurse Coordinator [phone]  Clinical Fax   [phone]  Administrative   [phone]  Administrative Fax  [phone]        Resulted Orders   MRI Abdomen w & w/o contrast mrcp    Narrative    Exam: MR ABDOMEN MRCP W/O & W CONTRAST, 2023 8:56 AM    Indication: Pancreatic cyst    Comparison: 10/31/2022, 3/18/2020    Technique: Images were acquired with and without intravenous  gadolinium contrast through the upper abdomen. The following MR images  were acquired without intravenous contrast: TrueFISP, multiplanar  T2-weighted, axial T1 in/out of phase, T2-weighted MRCP images, axial  diffusion-weighted and axial  apparent diffusion coefficient.  T1-weighted images were obtained before contrast at the multiple time  points following contrast injection. 3-D reformatted images were  generated by the technologist. Contrast dose: 8.5mL Gadavist    FINDINGS:    Liver: Scattered cysts. Otherwise normal liver.    Gallbladder/Bile Ducts: Normal. Normal bile ducts.    Spleen: Normal    Kidneys: Severe native atrophy. Bilateral cysts. No hydronephrosis.  Partially imaged transplant kidney in the right lower quadrant.    Adrenal glands: Normal    Pancreas: Severe atrophy. Greater than 10 pancreatic cysts.    Minimally increased size of multiple pancreatic cysts for example  measuring 1.8 x 1.6 cm in the body, previously 1.7 x 1.3 cm as well as  1.1 x 0.5 cm in the tail, previously 1.1 x 0.4 cm and 0.8 x 0.6 cm  more distally in the tail previously 0.6 x 0.6 cm (MRCP axial series  13, images 29 and 31 respectively). Multiple additional cysts are  unchanged. Other cysts appear slightly decreased compared to  3/18/2020. No suspicious features.    Unchanged clusters of dilated ducts, most prominent in the pancreatic  neck (series 17, image 15)    Bowel: Nondilated.  Small hiatal hernia.    Lymph nodes: No adenopathy    Blood vessels: Patent portal, splenic and superior mesenteric veins  without thrombus, Conventional hepatic arterial anatomy    Lung bases: Unremarkable    Bones and soft tissues: No acute osseous abnormality. Susceptibility  artifact from sternotomy wires.    Mesentery and abdominal wall: No hernia    Ascites: No      Impression    IMPRESSION:   1.  Waxing/waning size of pancreatic cysts consistent with side branch  IPMNs dating back to 3/18/2020, the largest measuring 1.8 cm. No  suspicious features.  2.  Unchanged clusters of dilated pancreatic side branches.  3.  Small hiatal hernia.    I have personally reviewed the examination and initial interpretation  and I agree with the findings.    LISSET ROSADO MD          SYSTEM ID:  [id number]

## 2023-05-25 ENCOUNTER — MYC MEDICAL ADVICE (OUTPATIENT)
Dept: FAMILY MEDICINE | Facility: CLINIC | Age: 68
End: 2023-05-25
Payer: MEDICARE

## 2023-05-25 DIAGNOSIS — Z94.0 KIDNEY REPLACED BY TRANSPLANT: ICD-10-CM

## 2023-05-26 ENCOUNTER — HOSPITAL ENCOUNTER (OUTPATIENT)
Facility: CLINIC | Age: 68
Discharge: HOME | End: 2023-05-26
Attending: INTERNAL MEDICINE | Admitting: INTERNAL MEDICINE
Payer: MEDICARE

## 2023-05-26 VITALS
RESPIRATION RATE: 16 BRPM | HEART RATE: 76 BPM | OXYGEN SATURATION: 98 % | DIASTOLIC BLOOD PRESSURE: 85 MMHG | SYSTOLIC BLOOD PRESSURE: 131 MMHG

## 2023-05-26 LAB
COLONOSCOPY: NORMAL
GLUCOSE BLDC GLUCOMTR-MCNC: 153 MG/DL (ref 70–99)

## 2023-05-26 PROCEDURE — 99153 MOD SED SAME PHYS/QHP EA: CPT | Performed by: INTERNAL MEDICINE

## 2023-05-26 PROCEDURE — G0500 MOD SEDAT ENDO SERVICE >5YRS: HCPCS | Performed by: INTERNAL MEDICINE

## 2023-05-26 PROCEDURE — 88305 TISSUE EXAM BY PATHOLOGIST: CPT | Mod: TC | Performed by: INTERNAL MEDICINE

## 2023-05-26 PROCEDURE — 82962 GLUCOSE BLOOD TEST: CPT

## 2023-05-26 PROCEDURE — 250N000011 HC RX IP 250 OP 636: Performed by: INTERNAL MEDICINE

## 2023-05-26 PROCEDURE — 45380 COLONOSCOPY AND BIOPSY: CPT | Performed by: INTERNAL MEDICINE

## 2023-05-26 PROCEDURE — 250N000009 HC RX 250: Performed by: INTERNAL MEDICINE

## 2023-05-26 PROCEDURE — 88305 TISSUE EXAM BY PATHOLOGIST: CPT | Mod: 26 | Performed by: PATHOLOGY

## 2023-05-26 RX ORDER — ONDANSETRON 2 MG/ML
4 INJECTION INTRAMUSCULAR; INTRAVENOUS
Status: DISCONTINUED | OUTPATIENT
Start: 2023-05-26 | End: 2023-05-26 | Stop reason: HOSPADM

## 2023-05-26 RX ORDER — DIPHENHYDRAMINE HYDROCHLORIDE 50 MG/ML
INJECTION INTRAMUSCULAR; INTRAVENOUS PRN
Status: DISCONTINUED | OUTPATIENT
Start: 2023-05-26 | End: 2023-05-26 | Stop reason: HOSPADM

## 2023-05-26 RX ORDER — FLUMAZENIL 0.1 MG/ML
0.2 INJECTION, SOLUTION INTRAVENOUS
Status: DISCONTINUED | OUTPATIENT
Start: 2023-05-26 | End: 2023-05-26 | Stop reason: HOSPADM

## 2023-05-26 RX ORDER — FENTANYL CITRATE 50 UG/ML
INJECTION, SOLUTION INTRAMUSCULAR; INTRAVENOUS PRN
Status: DISCONTINUED | OUTPATIENT
Start: 2023-05-26 | End: 2023-05-26 | Stop reason: HOSPADM

## 2023-05-26 RX ORDER — ONDANSETRON 4 MG/1
4 TABLET, ORALLY DISINTEGRATING ORAL EVERY 6 HOURS PRN
Status: DISCONTINUED | OUTPATIENT
Start: 2023-05-26 | End: 2023-05-26 | Stop reason: HOSPADM

## 2023-05-26 RX ORDER — NALOXONE HYDROCHLORIDE 0.4 MG/ML
0.4 INJECTION, SOLUTION INTRAMUSCULAR; INTRAVENOUS; SUBCUTANEOUS
Status: DISCONTINUED | OUTPATIENT
Start: 2023-05-26 | End: 2023-05-26 | Stop reason: HOSPADM

## 2023-05-26 RX ORDER — ONDANSETRON 2 MG/ML
4 INJECTION INTRAMUSCULAR; INTRAVENOUS EVERY 6 HOURS PRN
Status: DISCONTINUED | OUTPATIENT
Start: 2023-05-26 | End: 2023-05-26 | Stop reason: HOSPADM

## 2023-05-26 RX ORDER — NALOXONE HYDROCHLORIDE 0.4 MG/ML
0.2 INJECTION, SOLUTION INTRAMUSCULAR; INTRAVENOUS; SUBCUTANEOUS
Status: DISCONTINUED | OUTPATIENT
Start: 2023-05-26 | End: 2023-05-26 | Stop reason: HOSPADM

## 2023-05-26 RX ORDER — PROCHLORPERAZINE MALEATE 5 MG
5 TABLET ORAL EVERY 6 HOURS PRN
Status: DISCONTINUED | OUTPATIENT
Start: 2023-05-26 | End: 2023-05-26 | Stop reason: HOSPADM

## 2023-05-26 RX ORDER — LIDOCAINE 40 MG/G
CREAM TOPICAL
Status: DISCONTINUED | OUTPATIENT
Start: 2023-05-26 | End: 2023-05-26 | Stop reason: HOSPADM

## 2023-05-26 RX ORDER — LIDOCAINE HYDROCHLORIDE 20 MG/ML
SOLUTION OROPHARYNGEAL PRN
Status: DISCONTINUED | OUTPATIENT
Start: 2023-05-26 | End: 2023-05-26 | Stop reason: HOSPADM

## 2023-05-26 ASSESSMENT — ACTIVITIES OF DAILY LIVING (ADL): ADLS_ACUITY_SCORE: 35

## 2023-05-26 NOTE — H&P
[facility] Anesthesia Pre-op History and Physical    Murray Nicholson MRN# [id number]   Age: 68 year old YOB: 1955      Date of Surgery: 05/26/23   [facility]      Date of Exam 5/26/2023 Facility (In hospital)       Home clinic: [facility]  Primary care provider: Alicia Shearer    Type of procedure: colonoscopy         Chief Complaint and/or Reason for Procedure:   Screening for colorectal cancer         Active problem list:     Patient Active Problem List    Diagnosis Date Noted     Decreased hearing, unspecified laterality 08/23/2021     Priority: Medium     Erectile dysfunction, unspecified erectile dysfunction type 08/22/2021     Priority: Medium     Nocturia 08/22/2021     Priority: Medium     History of hernia repair 08/22/2021     Priority: Medium     Aftercare following organ transplant 12/24/2019     Priority: Medium     HTN, kidney transplant related 12/24/2019     Priority: Medium     Secondary renal hyperparathyroidism (H) 12/24/2019     Priority: Medium     Vitamin D deficiency 12/24/2019     Priority: Medium     Kidney replaced by transplant 12/19/2019     Priority: Medium     Pancreatic cyst 11/13/2019     Priority: Medium     Immunosuppression (H)      Priority: Medium     Heart replaced by transplant (H) 12/10/2018     Priority: Medium     Added automatically from request for surgery [id number]       Dyslipidemia      Priority: Medium     MGUS (monoclonal gammopathy of unknown significance)      Priority: Medium     Ascending aortic aneurysm (H)      Priority: Medium     Anemia in chronic renal disease      Priority: Medium     Problem list name updated by automated process. Provider to review and confirm       Allergic rhinitis 04/05/2006     Priority: Medium     Problem list name updated by automated process. Provider to review       Esophageal reflux 08/12/2004     Priority: Medium            Medications  (include herbals and vitamins):   Any Plavix use in the last 7 days? No     Current Facility-Administered Medications   Medication     lidocaine (LMX4) kit     lidocaine 1 % 0.1-1 mL     ondansetron (ZOFRAN) injection 4 mg     sodium chloride (PF) 0.9% PF flush 3 mL     sodium chloride (PF) 0.9% PF flush 3 mL     Facility-Administered Medications Ordered in Other Encounters   Medication     diatrizoate meglumine-sodium (GASTROGRAFIN/GASTROVIEW) 66-10 % solution 480 mL             Allergies:      Allergies   Allergen Reactions     Norco [Hydrocodone-Acetaminophen] Nausea and Vomiting     Cats      Throat tightness     Isosorbide Nitrate Other (See Comments)     hypotension     Penicillins Hives     Seasonal Allergies      rhinitis     Shrimp      Throat closes      Allergy to Latex? No  Allergy to tape?   No  Intolerances: None            Physical Exam:   All vitals have been reviewed  No data found.  No intake/output data recorded.  Airway assessment:   Patient is able to open mouth wide  Patient is able to stick out tongue}      ENT:   Normocephalic, without obvious abnormality, atraumatic, sinuses nontender on palpation, external ears without lesions, oral pharynx with moist mucous membranes, tonsils without erythema or exudates, gums normal and good dentition.     Neck:   Supple, symmetrical, trachea midline, no adenopathy, thyroid symmetric, not enlarged and no tenderness, skin normal     Lungs:   No increased work of breathing, good air exchange, clear to auscultation bilaterally, no crackles or wheezing     Cardiovascular:   Normal apical impulse, regular rate and rhythm, normal S1 and S2, no S3 or S4, and no murmur noted     Mallampati Class: III      Anesthetic risk and/or ASA classification: III             Lab / Radiology Results:     Lab Results   Component Value Date    WBC 4.4 04/11/2023    WBC 4.3 06/10/2021    RBC 4.31 04/11/2023    RBC 4.21 06/10/2021    HGB 12.9 04/11/2023    HGB 12.6 06/10/2021     HCT 38.1 04/11/2023    HCT 37.9 06/10/2021    MCV 88 04/11/2023    MCV 90 06/10/2021    RDW 12.6 04/11/2023    RDW 13.1 06/10/2021     04/11/2023     06/10/2021      Lab Results   Component Value Date     04/11/2023     07/06/2021    CO2 23 04/11/2023    CO2 27 08/09/2022    CO2 24 07/06/2021    BUN 21.0 04/11/2023    BUN 16 08/09/2022    BUN 18 07/06/2021    CREAT 2.1 06/03/2013     No components found for: PTINR  No components found for: APTT[APTT}         Francisco Bland MD

## 2023-05-26 NOTE — OR NURSING
Colonoscopy with polypectomy x2 via bx forceps and pt tolerated well with moderate sedation and on 2L nc oxygen.

## 2023-05-30 DIAGNOSIS — K86.2 PANCREAS CYST: Primary | ICD-10-CM

## 2023-05-30 LAB
PATH REPORT.COMMENTS IMP SPEC: NORMAL
PATH REPORT.COMMENTS IMP SPEC: NORMAL
PATH REPORT.FINAL DX SPEC: NORMAL
PATH REPORT.GROSS SPEC: NORMAL
PATH REPORT.MICROSCOPIC SPEC OTHER STN: NORMAL
PATH REPORT.RELEVANT HX SPEC: NORMAL
PHOTO IMAGE: NORMAL

## 2023-05-31 RX ORDER — LANCETS
EACH MISCELLANEOUS
Qty: 300 EACH | Refills: 1 | Status: SHIPPED | OUTPATIENT
Start: 2023-05-31

## 2023-05-31 RX ORDER — BLOOD SUGAR DIAGNOSTIC
STRIP MISCELLANEOUS
Qty: 300 STRIP | Refills: 1 | Status: SHIPPED | OUTPATIENT
Start: 2023-05-31

## 2023-05-31 NOTE — TELEPHONE ENCOUNTER
Prescriptions approved per [facility] Refill Protocol.    Writer responded via [employer].    REMI KaurN, RN-[facility]

## 2023-06-12 NOTE — LETTER
12/15/2017      RE: Murray Nicholson  [address]  [place] [address]       Rheumatology Clinic Visit     Murray Nicholson MRN# [id number]   YOB: 1955 Age: 62 year old     Date of Visit: 12/15/2017  Primary care provider: Yahir Turcios          Assessment and Plan:   #Polyarticular gout, recurrent and involving the hips with dual-energy CT showing MSU deposition bilaterally  #Hyperuricemia  #ESRD on peritoneal dialysis  #DM II     -continue allopurinol to 400 mg daily, will adjust based off labs next week when he has time in his schedule  -uric acid goal is < 6.0  -could potentially need alternative therapy (febuxostat) if unable to get uric acid to goal with allopurinol given ESRD  -continue prednisone to 5 mg daily for flare prophylaxis  -prednisone 40 mg daily for 3 days for gout flare    Seen and discussed with Dr. Giles Tang MD  Rheumatology Fellow  [phone]    Attending Note: I saw and evaluated the patient with Dr. Tang. I agree with the assessment and plan.    Lamberto Skaggs MD    Orders Placed This Encounter   Procedures     Uric acid     CBC with platelets differential     ALT     AST     Bilirubin  total     Creatinine     Alkaline phosphatase     Uric acid             Active Problem List:     Patient Active Problem List    Diagnosis Date Noted     Volume overload 11/13/2017     Priority: Medium     Chronic systolic heart failure (H) 07/25/2017     Priority: Medium     Fluid overload 04/08/2017     Priority: Medium     Anemia in stage 5 chronic kidney disease (H) 03/17/2017     Priority: Medium     Anemia, iron deficiency 02/15/2017     Priority: Medium     Type 2 diabetes mellitus with diabetic chronic kidney disease (H) 10/14/2015     Priority: Medium     Hypertension      Priority: Medium     Dyslipidemia      Priority: Medium     MGUS (monoclonal gammopathy of unknown significance)      Priority: Medium     Ascending aortic aneurysm (H)      Priority:  Medium     Anemia in chronic renal disease 05/19/2015     Priority: Medium     updating diagnosis code for icd10 cutover       CAD (coronary artery disease) 07/10/2014     Priority: Medium     Bicuspid aortic valve      Priority: Medium     Anemia of chronic disease 04/12/2013     Priority: Medium     Problem list name updated by automated process. Provider to review and confirm       Tubular adenoma 12/30/2011     Priority: Medium     Hypertensive cardiopathy 08/24/2011     Priority: Medium     Hypertension goal BP (blood pressure) < 130/80 01/25/2011     Priority: Medium     Hyperlipidemia LDL goal <100 10/31/2010     Priority: Medium     Per provider       CKD (chronic kidney disease) stage 5, GFR less than 15 ml/min (H) 02/09/2010     Priority: Medium     CHF (congestive heart failure) (H) 08/20/2008     Priority: Medium     Allergic rhinitis 04/05/2006     Priority: Medium     Problem list name updated by automated process. Provider to review       Hypersomnia with sleep apnea 08/18/2005     Priority: Medium     Problem list name updated by automated process. Provider to review       Esophageal reflux 08/12/2004     Priority: Medium          History of Present Illness:   Murray Nicholson is a 62 year old male with history of CAD and ischemic cardiomyopathy, CKD V planning on starting peritoneal dialysis next month, and DM II who presents for evaluation of gout. He was seen initially in April of 2017 while in the hospital by Dr. Gaston. He had polyarticular gout at that time, however he also had diffuse low back pain radiating into the groin and an MRI of the lumbar spine showed inflammation of the soft tissues and muscles of the right hip. Subsequent hip MRI showed cystic changes in both hips. His uric acid was checked and was 17.9. He was diagnosed with gout clinically because he declined an aspiration. He was started on allopurinol and prednisone with prompt resolution of his symptoms. His CK was normal. He also  reported a 1.5-2.0 year history of intermittent episodes of podagra and acute gout of his ankles. He has not had a kidney biopsy.     He denies pain in his hands, elbows, or shoulders. He also denies lumps, bumps, or deposits consistent with tophi. He does not have morning stiffness. Since being seen last he has been taking allopurinol 100 mg daily without trouble. He takes 10 mg of prednisone twice per day but often bumps it up on his own if he feels a flare is impending. He is still working actively at the [employer] and thus becomes quite incapacitated by gout.    Last seen: 8/8/2017    Interim history:  Murray is doing well apart from his hospitalization for CHF last month. He has had minimal gout flares, used 15 20 mg tablets of prednisone since I saw him in August. He did call for a refill last week, though notes this was for cramping in his legs which he was not sure was related to gout. We discussed his dialysis, CHF.         Review of Systems (other than in HPI):   Constitutional: negative  Skin: negative  Eyes: negative  Ears/Nose/Throat: negative  Respiratory: negative  Cardiovascular: negative  Gastrointestinal: negative  Genitourinary: negative  Musculoskeletal: negative  Neurologic: negative  Psychiatric: negative  Hematologic/Lymphatic/Immunologic: negative  Endocrine: negative          Past Medical History:     Past Medical History:   Diagnosis Date     (HFpEF) heart failure with preserved ejection fraction (H)      Allergic rhinitis, cause unspecified      Anemia of chronic kidney failure      Ascending aortic aneurysm (H)      Bicuspid aortic valve      CAD (coronary artery disease)      Chronic kidney disease, stage 5 (H)      Congestive heart failure, unspecified      Dyslipidemia      Esophageal reflux      Hypersomnia with sleep apnea, unspecified      Hypertension      MGUS (monoclonal gammopathy of unknown significance)      SHEELA (obstructive sleep apnea)      Systolic heart failure (H)       Type 2 diabetes mellitus (H)      Past Surgical History:   Procedure Laterality Date     ABDOMEN SURGERY      Hernia     LAPAROSCOPIC HERNIORRHAPHY INGUINAL BILATERAL Bilateral 2015    Procedure: LAPAROSCOPIC HERNIORRHAPHY INGUINAL BILATERAL;  Surgeon: Bobby Mcconnell MD;  Location: [facility] OR     LAPAROSCOPIC INSERTION CATHETER PERITONEAL DIALYSIS N/A 2017    Procedure: LAPAROSCOPIC INSERTION CATHETER PERITONEAL DIALYSIS;  Laparoscopic Peritoneal Dialysis Catheter Placement - Anesthesia with block;  Surgeon: Esteban Arvizu MD;  Location: [facility] OR          Social History:     Social History     Occupational History     Not on file.     Social History Main Topics     Smoking status: Former Smoker     Packs/day: 1.00     Years: 19.00     Types: Cigarettes     Quit date: 1994     Smokeless tobacco: Never Used     Alcohol use No     Drug use: No     Sexual activity: Not Currently     Partners: Female     Birth control/ protection: Condom          Family History:     Family History   Problem Relation Age of Onset     C.A.D. Father       from-never knew father-age 60     DIABETES Father      CEREBROVASCULAR DISEASE Father      Hypertension No family hx of      Breast Cancer No family hx of      Cancer - colorectal No family hx of      Prostate Cancer No family hx of      KIDNEY DISEASE No family hx of           Allergies:     Allergies   Allergen Reactions     Norco [Hydrocodone-Acetaminophen] Nausea and Vomiting     Cats      Throat tightness     Penicillins Hives     Seasonal Allergies      rhinitis     Shrimp      Throat closes           Medications:     Current Outpatient Prescriptions   Medication Sig Dispense Refill     predniSONE (DELTASONE) 10 MG tablet Take 2 tablets (20 mg) by mouth daily X 4 days when flaring. 30 tablet 1     amLODIPine (NORVASC) 5 MG tablet Take 1 tablet (5 mg) by mouth daily 90 tablet 0     metoprolol (TOPROL XL) 100 MG 24 hr tablet Take 1 tablet (100 mg) by mouth daily  90 tablet 3     allopurinol (ZYLOPRIM) 100 MG tablet Take 1 tablet (100 mg) by mouth daily Take with 300 mg tabs for 400 mg /day total. 30 tablet 2     allopurinol (ZYLOPRIM) 300 MG tablet Take 1 tablet (300 mg) by mouth daily Take with 100 mg tabs for 400 mg /day total. 30 tablet 2     losartan (COZAAR) 100 MG tablet TAKE 1 TABLET BY MOUTH DAILY 90 tablet 0     albuterol (PROAIR HFA/PROVENTIL HFA/VENTOLIN HFA) 108 (90 BASE) MCG/ACT Inhaler Inhale 2 puffs into the lungs every 6 hours as needed for shortness of breath / dyspnea or wheezing 1 Inhaler 0     fluticasone (FLONASE) 50 MCG/ACT spray Spray 1-2 sprays into both nostrils daily 16 g 11     spironolactone (ALDACTONE) 100 MG tablet Take 0.5 tablets (50 mg) by mouth daily 30 tablet 11     bumetanide (BUMEX) 2 MG tablet Take 3 tablets (6 mg) by mouth 2 times daily 180 tablet 3     glipiZIDE (GLUCOTROL) 5 MG tablet Take 1 tablet by mouth. TAKE 1 TABLET BY MOUTH DAILY BEFORE A MEAL 90 tablet 0     predniSONE (DELTASONE) 5 MG tablet Take 1 tablet (5 mg) by mouth daily 90 tablet 1     potassium chloride SA (K-DUR/KLOR-CON M) 20 MEQ CR tablet TAKE 2 TABLETS BY MOUTH EVERY MORNING AND 1 TABLET BY MOUTH IN THE EVENING 270 tablet 1     ferrous sulfate (IRON) 325 (65 FE) MG tablet Take 1 tablet (325 mg) by mouth 200 tablet 3     calcitRIOL (ROCALTROL) 0.25 MCG capsule Take 1 capsule (0.25 mcg) by mouth daily 90 capsule 0     atorvastatin (LIPITOR) 40 MG tablet Take 1 tablet (40 mg) by mouth daily 90 tablet 3     isosorbide mononitrate (IMDUR) 60 MG 24 hr tablet Take 1 tablet (60 mg) by mouth daily 90 tablet 3     hydrALAZINE (APRESOLINE) 50 MG tablet Take 1 tablet (50 mg) by mouth 3 times daily 270 tablet 3     omeprazole (PRILOSEC) 20 MG CR capsule Take 20 mg by mouth daily At night       loratadine (CLARITIN) 10 MG tablet Take 10 mg by mouth daily as needed Reported on 5/3/2017       ASPIRIN 81 MG OR TABS Take 1 tablet (81 mg) by mouth at bedtime            Physical  "Exam:   Blood pressure 110/65, pulse 87, temperature 97.7  F (36.5  C), temperature source Oral, height 1.753 m (5' 9\"), weight 90.3 kg (199 lb), SpO2 97 %.  Wt Readings from Last 4 Encounters:   12/15/17 90.3 kg (199 lb)   11/21/17 88.3 kg (194 lb 9.6 oz)   11/15/17 87.6 kg (193 lb 3.2 oz)   08/18/17 89.4 kg (197 lb)     Constitutional: well-developed, appearing stated age; cooperative  Eyes: normal EOM, PERRLA, vision, conjunctiva, sclera  ENT: normal external ears, nose, hearing, lips, teeth, gums, throat, no mucous membrane lesions, normal saliva pool  Neck: no mass or thyroid enlargement  Resp: lungs clear to auscultation  CV: regular rate and rhythm, no murmurs, rubs or gallops, no edema  GI: no abdominal mass or tenderness, no organomegaly  : not tested  Lymph: no cervical, supraclavicular, or epitrochlear nodes  MS: The TMJ, neck, shoulder, elbow, wrist, MCP/PIP/DIP, spine, hip, knee, ankle, and foot MTP/IP joints were examined. No active synovitis or altered joint anatomy. Full joint ROM. Normal  strength. No dactylitis,  tenosynovitis, enthesopathy.   Skin: no nail pitting, alopecia, rash, nodules or lesions  Neuro: normal cranial nerves, strength, sensation, DTR's  Psych: normal judgement, orientation, memory, affect.         Data:     Results for orders placed or performed in visit on 11/21/17   Basic metabolic panel   Result Value Ref Range    Sodium 142 133 - 144 mmol/L    Potassium 4.6 3.4 - 5.3 mmol/L    Chloride 102 94 - 109 mmol/L    Carbon Dioxide 29 20 - 32 mmol/L    Anion Gap 11 3 - 14 mmol/L    Glucose 189 (H) 70 - 99 mg/dL    Urea Nitrogen 66 (H) 7 - 30 mg/dL    Creatinine 7.65 (H) 0.66 - 1.25 mg/dL    GFR Estimate 7 (L) >60 mL/min/1.7m2    GFR Estimate If Black 9 (L) >60 mL/min/1.7m2    Calcium 8.9 8.5 - 10.1 mg/dL   Magnesium   Result Value Ref Range    Magnesium 1.9 1.6 - 2.3 mg/dL     Hemoglobin   Date Value Ref Range Status   11/14/2017 9.1 (L) 13.3 - 17.7 g/dL Final   11/13/2017 " 8.9 (L) 13.3 - 17.7 g/dL Final   08/02/2017 10.4 (L) 13.3 - 17.7 g/dL Final     Urea Nitrogen   Date Value Ref Range Status   11/21/2017 66 (H) 7 - 30 mg/dL Final   11/15/2017 69 (H) 7 - 30 mg/dL Final   11/14/2017 76 (H) 7 - 30 mg/dL Final     Creatinine   Date Value Ref Range Status   06/03/2013 2.1 (H) 0.66 - 1.25 mg/dL Final   05/17/2010 1.8 (H) 0.66 - 1.25 mg/dL Final     Comment:     New IDMS-traceable calibration  beginning 12/17/08     Sed Rate   Date Value Ref Range Status   01/13/2016 80 (H) 0 - 20 mm/h Final     CRP Inflammation   Date Value Ref Range Status   07/05/2017 35.4 (H) 0.0 - 8.0 mg/L Final   05/31/2017 15.9 (H) 0.0 - 8.0 mg/L Final   05/17/2017 39.3 (H) 0.0 - 8.0 mg/L Final     AST   Date Value Ref Range Status   11/13/2017 22 0 - 45 U/L Final   07/05/2017 13 0 - 45 U/L Final   05/31/2017 18 0 - 45 U/L Final     Albumin   Date Value Ref Range Status   11/13/2017 2.3 (L) 3.4 - 5.0 g/dL Final   08/02/2017 3.2 (L) 3.4 - 5.0 g/dL Final   07/05/2017 2.7 (L) 3.4 - 5.0 g/dL Final     Alkaline Phosphatase   Date Value Ref Range Status   11/13/2017 104 40 - 150 U/L Final   07/05/2017 98 40 - 150 U/L Final   05/31/2017 97 40 - 150 U/L Final     ALT   Date Value Ref Range Status   11/13/2017 26 0 - 70 U/L Final   07/05/2017 15 0 - 70 U/L Final   05/31/2017 16 0 - 70 U/L Final     Recent Labs   Lab Test  11/21/17   1239  11/15/17   0758  11/14/17   0645  11/13/17   1709  08/02/17   1311   07/05/17   1204   05/31/17   1111   04/12/17   0935   04/09/15   0900   05/15/13   1418   WBC   --    --   10.1  9.6  7.1   --   11.9*   < >  8.0   < >   --    < >  6.8   < >   --    HGB   --    --   9.1*  8.9*  10.4*   < >  8.9*   < >  9.9*   < >   --    < >  8.8*   < >   --    HCT   --    --   29.8*  28.9*  31.6*   < >  27.4*   < >  31.1*   < >   --    < >  26.2*   < >   --    MCV   --    --   104*  101*  92   --   91   < >  92   < >   --    < >  86   < >   --    PLT   --    --   234  277  221   --   267   < >  302   <  >   --    < >  339   < >   --    BUN  66*  69*  76*  81*  137*   --   130*   < >  146*   < >   --    < >  54*   < >  27   TSH   --    --    --    --    --    --    --    --    --    --   1.26   --   3.47   --   2.05   AST   --    --    --   22   --    --   13   --   18   < >   --    < >  19   --   33   ALT   --    --    --   26   --    --   15   --   16   < >   --    < >  25   --   36   ALKPHOS   --    --    --   104   --    --   98   --   97   < >   --    < >  154*   --   100    < > = values in this interval not displayed.     Reviewed Rheumatology lab flowsheet    Darvin Tang MD, MD       Accession #: [id number] Accession #: [id number]

## 2023-06-15 ENCOUNTER — TELEPHONE (OUTPATIENT)
Dept: TRANSPLANT | Facility: CLINIC | Age: 68
End: 2023-06-15
Payer: MEDICARE

## 2023-06-15 ENCOUNTER — PRE VISIT (OUTPATIENT)
Dept: TRANSPLANT | Facility: CLINIC | Age: 68
End: 2023-06-15
Payer: MEDICARE

## 2023-06-15 ENCOUNTER — MYC MEDICAL ADVICE (OUTPATIENT)
Dept: NEPHROLOGY | Facility: CLINIC | Age: 68
End: 2023-06-15
Payer: MEDICARE

## 2023-06-15 DIAGNOSIS — Z94.0 KIDNEY REPLACED BY TRANSPLANT: Primary | ICD-10-CM

## 2023-06-15 DIAGNOSIS — E10.29 DM (DIABETES MELLITUS) TYPE I CONTROLLED WITH RENAL MANIFESTATION (H): ICD-10-CM

## 2023-06-15 DIAGNOSIS — Z94.1 HEART REPLACED BY TRANSPLANT (H): ICD-10-CM

## 2023-06-15 DIAGNOSIS — Z13.220 ENCOUNTER FOR LIPID SCREENING FOR CARDIOVASCULAR DISEASE: ICD-10-CM

## 2023-06-15 DIAGNOSIS — Z13.6 ENCOUNTER FOR LIPID SCREENING FOR CARDIOVASCULAR DISEASE: ICD-10-CM

## 2023-06-15 DIAGNOSIS — Z12.5 PROSTATE CANCER SCREENING: ICD-10-CM

## 2023-06-15 DIAGNOSIS — Z79.899 ENCOUNTER FOR LONG-TERM (CURRENT) USE OF MEDICATIONS: ICD-10-CM

## 2023-06-16 NOTE — TELEPHONE ENCOUNTER
Pt scheduled for transplant follow up next week. Reviewed date and times of appointments and prep for labs/testing: fast for 8-12 hours for fasting labs, okay to take all meds in the AM with a sip of water except for tacrolimus; no caffeine 12 hours prior to MRI.     Pt verbalized understanding of all information.

## 2023-06-19 ENCOUNTER — LAB (OUTPATIENT)
Dept: LAB | Facility: CLINIC | Age: 68
End: 2023-06-19
Attending: INTERNAL MEDICINE
Payer: MEDICARE

## 2023-06-19 ENCOUNTER — HOSPITAL ENCOUNTER (OUTPATIENT)
Dept: GENERAL RADIOLOGY | Facility: CLINIC | Age: 68
Discharge: HOME | End: 2023-06-19
Attending: INTERNAL MEDICINE
Payer: MEDICARE

## 2023-06-19 ENCOUNTER — HOSPITAL ENCOUNTER (OUTPATIENT)
Dept: MRI IMAGING | Facility: CLINIC | Age: 68
Discharge: HOME | End: 2023-06-19
Attending: INTERNAL MEDICINE
Payer: MEDICARE

## 2023-06-19 ENCOUNTER — OFFICE VISIT (OUTPATIENT)
Dept: CARDIOLOGY | Facility: CLINIC | Age: 68
End: 2023-06-19
Attending: INTERNAL MEDICINE
Payer: MEDICARE

## 2023-06-19 VITALS
HEART RATE: 96 BPM | SYSTOLIC BLOOD PRESSURE: 124 MMHG | OXYGEN SATURATION: 100 % | BODY MASS INDEX: 27.89 KG/M2 | DIASTOLIC BLOOD PRESSURE: 79 MMHG | HEIGHT: 68 IN | WEIGHT: 184 LBS

## 2023-06-19 VITALS — DIASTOLIC BLOOD PRESSURE: 114 MMHG | HEART RATE: 76 BPM | SYSTOLIC BLOOD PRESSURE: 176 MMHG | RESPIRATION RATE: 16 BRPM

## 2023-06-19 DIAGNOSIS — I10 PRIMARY HYPERTENSION: ICD-10-CM

## 2023-06-19 DIAGNOSIS — Z94.0 KIDNEY REPLACED BY TRANSPLANT: ICD-10-CM

## 2023-06-19 DIAGNOSIS — Z12.5 PROSTATE CANCER SCREENING: ICD-10-CM

## 2023-06-19 DIAGNOSIS — Z94.1 HEART REPLACED BY TRANSPLANT (H): ICD-10-CM

## 2023-06-19 DIAGNOSIS — Z13.6 ENCOUNTER FOR LIPID SCREENING FOR CARDIOVASCULAR DISEASE: ICD-10-CM

## 2023-06-19 DIAGNOSIS — E10.29 DM (DIABETES MELLITUS) TYPE I CONTROLLED WITH RENAL MANIFESTATION (H): ICD-10-CM

## 2023-06-19 DIAGNOSIS — Z79.899 ENCOUNTER FOR LONG-TERM (CURRENT) USE OF MEDICATIONS: ICD-10-CM

## 2023-06-19 DIAGNOSIS — Z94.1 HEART REPLACED BY TRANSPLANT (H): Primary | ICD-10-CM

## 2023-06-19 DIAGNOSIS — R91.1 LUNG NODULE: ICD-10-CM

## 2023-06-19 DIAGNOSIS — Z13.220 ENCOUNTER FOR LIPID SCREENING FOR CARDIOVASCULAR DISEASE: ICD-10-CM

## 2023-06-19 LAB
ALBUMIN SERPL BCG-MCNC: 4.4 G/DL (ref 3.5–5.2)
ALP SERPL-CCNC: 147 U/L (ref 40–129)
ALT SERPL W P-5'-P-CCNC: 21 U/L (ref 0–70)
ANION GAP SERPL CALCULATED.3IONS-SCNC: 14 MMOL/L (ref 7–15)
AST SERPL W P-5'-P-CCNC: 30 U/L (ref 0–45)
BILIRUB SERPL-MCNC: 0.9 MG/DL
BUN SERPL-MCNC: 14 MG/DL (ref 8–23)
CALCIUM SERPL-MCNC: 9.7 MG/DL (ref 8.8–10.2)
CHLORIDE SERPL-SCNC: 102 MMOL/L (ref 98–107)
CHOLEST SERPL-MCNC: 152 MG/DL
CK SERPL-CCNC: 173 U/L (ref 39–308)
CREAT SERPL-MCNC: 1.01 MG/DL (ref 0.67–1.17)
DEPRECATED HCO3 PLAS-SCNC: 20 MMOL/L (ref 22–29)
ERYTHROCYTE [DISTWIDTH] IN BLOOD BY AUTOMATED COUNT: 12.9 % (ref 10–15)
GFR SERPL CREATININE-BSD FRML MDRD: 81 ML/MIN/1.73M2
GLUCOSE SERPL-MCNC: 163 MG/DL (ref 70–99)
HBA1C MFR BLD: 7.3 %
HCT VFR BLD AUTO: 40 % (ref 40–53)
HDLC SERPL-MCNC: 47 MG/DL
HGB BLD-MCNC: 13 G/DL (ref 13.3–17.7)
LDLC SERPL CALC-MCNC: 64 MG/DL
MAGNESIUM SERPL-MCNC: 1.3 MG/DL (ref 1.7–2.3)
MCH RBC QN AUTO: 29.5 PG (ref 26.5–33)
MCHC RBC AUTO-ENTMCNC: 32.5 G/DL (ref 31.5–36.5)
MCV RBC AUTO: 91 FL (ref 78–100)
NONHDLC SERPL-MCNC: 105 MG/DL
PHOSPHATE SERPL-MCNC: 3.1 MG/DL (ref 2.5–4.5)
PLATELET # BLD AUTO: 206 10E3/UL (ref 150–450)
POTASSIUM SERPL-SCNC: 4.2 MMOL/L (ref 3.4–5.3)
PROT SERPL-MCNC: 7.4 G/DL (ref 6.4–8.3)
PSA SERPL DL<=0.01 NG/ML-MCNC: 2.88 NG/ML (ref 0–4.5)
RADIOLOGIST FLAGS: NORMAL
RBC # BLD AUTO: 4.41 10E6/UL (ref 4.4–5.9)
SODIUM SERPL-SCNC: 136 MMOL/L (ref 136–145)
TACROLIMUS BLD-MCNC: 6.1 UG/L (ref 5–15)
TME LAST DOSE: NORMAL H
TME LAST DOSE: NORMAL H
TRIGL SERPL-MCNC: 205 MG/DL
WBC # BLD AUTO: 4.7 10E3/UL (ref 4–11)

## 2023-06-19 PROCEDURE — 83735 ASSAY OF MAGNESIUM: CPT

## 2023-06-19 PROCEDURE — 255N000002 HC RX 255 OP 636: Performed by: INTERNAL MEDICINE

## 2023-06-19 PROCEDURE — 71046 X-RAY EXAM CHEST 2 VIEWS: CPT | Mod: 26 | Performed by: RADIOLOGY

## 2023-06-19 PROCEDURE — 82374 ASSAY BLOOD CARBON DIOXIDE: CPT

## 2023-06-19 PROCEDURE — 99215 OFFICE O/P EST HI 40 MIN: CPT | Mod: 25 | Performed by: INTERNAL MEDICINE

## 2023-06-19 PROCEDURE — 83036 HEMOGLOBIN GLYCOSYLATED A1C: CPT

## 2023-06-19 PROCEDURE — 999N000127 HC STATISTIC PERIPHERAL IV START W US GUIDANCE

## 2023-06-19 PROCEDURE — G0463 HOSPITAL OUTPT CLINIC VISIT: HCPCS | Performed by: INTERNAL MEDICINE

## 2023-06-19 PROCEDURE — 82550 ASSAY OF CK (CPK): CPT

## 2023-06-19 PROCEDURE — 250N000011 HC RX IP 250 OP 636: Performed by: RADIOLOGY

## 2023-06-19 PROCEDURE — 82435 ASSAY OF BLOOD CHLORIDE: CPT

## 2023-06-19 PROCEDURE — 87799 DETECT AGENT NOS DNA QUANT: CPT

## 2023-06-19 PROCEDURE — 80197 ASSAY OF TACROLIMUS: CPT

## 2023-06-19 PROCEDURE — 75563 CARD MRI W/STRESS IMG & DYE: CPT | Mod: 26 | Performed by: RADIOLOGY

## 2023-06-19 PROCEDURE — 86832 HLA CLASS I HIGH DEFIN QUAL: CPT | Performed by: TRANSPLANT SURGERY

## 2023-06-19 PROCEDURE — G1010 CDSM STANSON: HCPCS | Performed by: RADIOLOGY

## 2023-06-19 PROCEDURE — A9585 GADOBUTROL INJECTION: HCPCS | Performed by: INTERNAL MEDICINE

## 2023-06-19 PROCEDURE — G0103 PSA SCREENING: HCPCS

## 2023-06-19 PROCEDURE — 93018 CV STRESS TEST I&R ONLY: CPT | Performed by: RADIOLOGY

## 2023-06-19 PROCEDURE — 86833 HLA CLASS II HIGH DEFIN QUAL: CPT | Performed by: TRANSPLANT SURGERY

## 2023-06-19 PROCEDURE — 84100 ASSAY OF PHOSPHORUS: CPT

## 2023-06-19 PROCEDURE — 71046 X-RAY EXAM CHEST 2 VIEWS: CPT

## 2023-06-19 PROCEDURE — G1010 CDSM STANSON: HCPCS

## 2023-06-19 PROCEDURE — 83718 ASSAY OF LIPOPROTEIN: CPT

## 2023-06-19 PROCEDURE — 93016 CV STRESS TEST SUPVJ ONLY: CPT | Performed by: RADIOLOGY

## 2023-06-19 PROCEDURE — 93005 ELECTROCARDIOGRAM TRACING: CPT

## 2023-06-19 PROCEDURE — 86352 CELL FUNCTION ASSAY W/STIM: CPT

## 2023-06-19 PROCEDURE — 85027 COMPLETE CBC AUTOMATED: CPT

## 2023-06-19 PROCEDURE — 36415 COLL VENOUS BLD VENIPUNCTURE: CPT

## 2023-06-19 RX ORDER — METHYLPREDNISOLONE SODIUM SUCCINATE 125 MG/2ML
125 INJECTION, POWDER, LYOPHILIZED, FOR SOLUTION INTRAMUSCULAR; INTRAVENOUS
Status: DISCONTINUED | OUTPATIENT
Start: 2023-06-19 | End: 2023-06-20 | Stop reason: HOSPADM

## 2023-06-19 RX ORDER — DIAZEPAM 5 MG
5 TABLET ORAL EVERY 30 MIN PRN
Status: DISCONTINUED | OUTPATIENT
Start: 2023-06-19 | End: 2023-06-20 | Stop reason: HOSPADM

## 2023-06-19 RX ORDER — DIPHENHYDRAMINE HCL 25 MG
25 CAPSULE ORAL
Status: DISCONTINUED | OUTPATIENT
Start: 2023-06-19 | End: 2023-06-20 | Stop reason: HOSPADM

## 2023-06-19 RX ORDER — GADOBUTROL 604.72 MG/ML
10 INJECTION INTRAVENOUS ONCE
Status: COMPLETED | OUTPATIENT
Start: 2023-06-19 | End: 2023-06-19

## 2023-06-19 RX ORDER — CAFFEINE CITRATE 20 MG/ML
60 SOLUTION INTRAVENOUS
Status: DISCONTINUED | OUTPATIENT
Start: 2023-06-19 | End: 2023-06-20 | Stop reason: HOSPADM

## 2023-06-19 RX ORDER — ALBUTEROL SULFATE 90 UG/1
2 AEROSOL, METERED RESPIRATORY (INHALATION) EVERY 5 MIN PRN
Status: DISCONTINUED | OUTPATIENT
Start: 2023-06-19 | End: 2023-06-20 | Stop reason: HOSPADM

## 2023-06-19 RX ORDER — DIPHENHYDRAMINE HYDROCHLORIDE 50 MG/ML
25-50 INJECTION INTRAMUSCULAR; INTRAVENOUS
Status: DISCONTINUED | OUTPATIENT
Start: 2023-06-19 | End: 2023-06-20 | Stop reason: HOSPADM

## 2023-06-19 RX ORDER — AMINOPHYLLINE 25 MG/ML
100 INJECTION, SOLUTION INTRAVENOUS ONCE
Status: COMPLETED | OUTPATIENT
Start: 2023-06-19 | End: 2023-06-19

## 2023-06-19 RX ORDER — ONDANSETRON 2 MG/ML
4 INJECTION INTRAMUSCULAR; INTRAVENOUS
Status: DISCONTINUED | OUTPATIENT
Start: 2023-06-19 | End: 2023-06-20 | Stop reason: HOSPADM

## 2023-06-19 RX ORDER — ACYCLOVIR 200 MG/1
0-1 CAPSULE ORAL
Status: DISCONTINUED | OUTPATIENT
Start: 2023-06-19 | End: 2023-06-20 | Stop reason: HOSPADM

## 2023-06-19 RX ORDER — REGADENOSON 0.08 MG/ML
0.4 INJECTION, SOLUTION INTRAVENOUS ONCE
Status: COMPLETED | OUTPATIENT
Start: 2023-06-19 | End: 2023-06-19

## 2023-06-19 RX ADMIN — REGADENOSON 0.4 MG: 0.08 INJECTION, SOLUTION INTRAVENOUS at 10:58

## 2023-06-19 RX ADMIN — GADOBUTROL 10 ML: 604.72 INJECTION INTRAVENOUS at 11:33

## 2023-06-19 RX ADMIN — GADOBUTROL 10 ML: 604.72 INJECTION INTRAVENOUS at 11:34

## 2023-06-19 RX ADMIN — AMINOPHYLLINE 100 MG: 25 INJECTION, SOLUTION INTRAVENOUS at 11:00

## 2023-06-19 ASSESSMENT — PAIN SCALES - GENERAL: PAINLEVEL: NO PAIN (0)

## 2023-06-19 NOTE — NURSING NOTE
Chief Complaint   Patient presents with     Follow Up     Return heart transplant       Vitals were taken, medications reconciled.    Mary Alice Benson, EMT   12:54 PM

## 2023-06-19 NOTE — PATIENT INSTRUCTIONS
Please call your transplant coordinator at [phone] with any questions or concerns.  Please note: after hours, weekends and holidays, this phone number is routed to an  to page out the coordinator on call.     Coordinator will update you on remaining results and follow up plan for chest xray      Next lab draw: Pending today's results   Full fasting labs in 6 months and per renal team     Derm - please schedule  Eye - please schedule    Flu and COVID vaccine this fall     Please work on bringing down the A1C (diet and exercise)

## 2023-06-19 NOTE — NURSING NOTE
Transplant Coordinator Note  Reason for visit:  5th year transplant annual     Health concerns addressed today:  Pt seen in clinic with Dr Ernst. MD reviewed meds, available labs, and testing. Pt reports doing well; working PT, riding bike. Occas episodes of dizziness/SOB at work when bending down and reaching up. Thinks it's better since he consistently takes his magnesium and added vit D. Understands that it may just be the lost nerve in his heart or dehydration.    A1C 7.3 - sl increase, pt understands the importance to DM control     Preventive cares reviewed.     Reviewed CXR after MD left-per MD send to nodule clinic. My Chart message sent     Immunosuppressants   mg BID   FK 1/0.5 mg; goal 5-7; level pending   No DSAs  Immuknow 2022  - 231   Allomaps 36      Routine screenings:    Derm: due  Dental: UTD  Colonoscopy: 2023 w/polyps   Prostate: PSA 2.88   Eye: due  Pneumonia 12 and 23 UTD:   Flu vaccine 10/22; due this fall    Shingrix 2/2   COVID last dose Nov 22; discussed      Medication record reviewed and reconciled. Questions and concerns addressed. AVS reviewed and copy available in [employer]. Pt verbalized an understanding of plan of care.      Patient Instructions  ~Please call your transplant coordinator at [phone] with any questions or concerns.  Please note: after hours, weekends and holidays, this phone number is routed to an  to page out the coordinator on call.   ~Coordinator will update you on remaining results and follow up plan for chest xray    ~Next lab draw: Pending today's results   ~Full fasting labs in 6 months and per renal team   ~Derm - please schedule  ~Eye - please schedule  ~Flu and COVID vaccine this fall   ~Please work on bringing down the A1C (diet and exercise)

## 2023-06-19 NOTE — LETTER
2023      RE: Murray Nicholson  [address]  [place] [address]       Dear Colleague,    Thank you for the opportunity to participate in the care of your patient, Murray Nicholson, at the [facility] [place] at [facility]. Please see a copy of my visit note below.    Cardiology Clinic Note     Yahir Turcios MD    [facility]    [address] [id number]       Ana Luisa Mcpherson MD    [facility]    [address] [facility] [address]    [place]  [address]       RE: Murray Nicholson   MRN: [id number]   : 1955      Dear Dr. Turcios and Dr. Mcpherson:      We had the pleasure of seeing Mr. Murray Nicholson for followup in our Cardiac Transplant Clinic at the [facility].  As you know, he is a very pleasant 68-year-old male who underwent orthotopic heart transplantation on 2018 and kidney transplant in 2019. His current problem list includes:     1.  Status post orthotopic heart transplantation.   2.  Neutropenia  3.  Oral mucosal ulcer secondary to neutropenia with Klebsiella infection.   4.  Pseudomonas bacteremia, resolved.   5.  Perforation of the small bowel, status post small-bowel resection and ileostomy.   6.  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   7.  Hypertension.   8.  Hyperlipidemia.   9.  End-stage renal disease requiring hemodialysis - S/P Kidney transplant in Dec 2019    He is returning today for his 5-year annual follow-up.  He is overall doing very well.  He has no specific complaints.  He started a part-time job which he has been enjoying it. He has a new girl friend. No recent hospitalizations or ER visits.  He is compliant with his medications.        Current Outpatient Medications   Medication Sig    amLODIPine (NORVASC) 5 MG tablet Take 1 tablet (5 mg) by mouth daily    aspirin 81 MG EC tablet Take 81 mg by mouth every evening    atorvastatin  (LIPITOR) 40 MG tablet Take 1 tablet (40 mg) by mouth every evening 90 day supply if able    biotin 1000 MCG TABS tablet Take 1,000 mcg by mouth every morning Patient takes every other day    blood glucose (ACCU-CHEK GUIDE) test strip Use to test blood sugar 3 times daily or as directed.    blood glucose monitoring (ACCU-CHEK FASTCLIX) lancets USE TO TEST 3 TIMES DAILY OR AS DIRECTED.    calcium citrate and vitamin D (CITRACAL) 200-250 MG-UNIT TABS per tablet Take 1 tablet by mouth 2 times daily    Calcium Polycarbophil (FIBER) 625 MG tablet Take by mouth every morning    co-enzyme Q-10 30 MG CAPS capsule Take by mouth every morning    loratadine (CLARITIN) 10 MG tablet Take 10 mg by mouth every evening Patient takes at bedtime    magnesium 250 MG tablet Take 1 tablet by mouth every morning    metFORMIN (GLUCOPHAGE XR) 500 MG 24 hr tablet Take 4 tabs every AM    Misc Natural Products (TART CHERRY ADVANCED PO) Take by mouth every morning    Multiple Vitamins-Minerals (HAIR SKIN NAILS PO) Take by mouth every morning    multivitamin (CENTRUM SILVER) tablet Take 1 tablet by mouth every morning    mycophenolate (GENERIC EQUIVALENT) 250 MG capsule Take 3 capsules (750 mg) by mouth 2 times daily    OMEPRAZOLE PO Take 20 mg by mouth every morning    sulfamethoxazole-trimethoprim (BACTRIM) 400-80 MG tablet Take 1 tablet by mouth every morning    sulfamethoxazole-trimethoprim (BACTRIM) 400-80 MG tablet Take 1 tablet by mouth 2 times daily    tacrolimus (GENERIC EQUIVALENT) 0.5 MG capsule Take 1 capsule (0.5 mg) by mouth every evening    tacrolimus (GENERIC EQUIVALENT) 1 MG capsule Take 1 capsule (1 mg) by mouth every morning     Current Facility-Administered Medications   Medication    cilgavimab 300 mg/tixagevimab 300 mg (EVUSHELD) - FULL DOSE     Facility-Administered Medications Ordered in Other Visits   Medication    diatrizoate meglumine-sodium (GASTROGRAFIN/GASTROVIEW) 66-10 % solution 480 mL        REVIEW OF SYSTEMS:   "A detailed 10-point review of systems was obtained as described in History of Present Illness.  All other systems are reviewed and are negative.      PHYSICAL EXAMINATION:   /79 (BP Location: Right arm, Patient Position: Sitting, Cuff Size: Adult Regular)   Pulse 96   Ht 1.72 m (5' 7.72\")   Wt 83.5 kg (184 lb)   SpO2 100%   BMI 28.21 kg/m    He was awake, alert, oriented x3.  He was in no apparent distress.  He was comfortable.  He had no pallor, cyanosis or jaundice.  He had no jugular venous distention.  His carotids were 2+ bilaterally.  Cardiac auscultation revealed normal S1 and S2 with no murmur rub or gallop.  Auscultation of his lungs revealed equal air entry on both sides with no added sounds.  His abdomen was soft with no guarding no tenderness no rigidity no guarding.  He had no focal neurological deficit.  His extremities showed no edema.    Testing:    EKG (06/2023)  Sinus rhythm   Cannot rule out Anterior infarct (cited on or before 19-JUN-2023)   Abnormal ECG   When compared with ECG of 19-JUN-2023 09:56 No significant change was found      Cardiac MRI (06/2023)  1. Heart transplant. The LV is normal in cavity size and wall thickness. The global systolic function is  normal. The LVEF is 67%. There are no regional wall motion abnormalities.     2. The RV is normal in cavity size. The global systolic function is normal. The RVEF is 53%.      3. Both atria are normal in size.     4. There is no significant valvular disease.      5. Late gadolinium enhancement imaging shows no MI, fibrosis or infiltrative disease. T2 mapping relaxation  time is 45ms within expected range.     6. Regadenoson stress perfusion imaging shows no ischemia.     7. There is no pericardial effusion or thickening.     8.  There is no intracardiac thrombus.     9. Ascending aorta is dilated just beyond the anastomosis at 4.2 cm not significantly changed.     CONCLUSIONS:  Heart transplant. Normal biventricular structure " and function with LVEF 67% and RVEF 53%. No  MI, fibrosis or infiltrative disease. No perfusion deficits on Regadenoson stress imaging to suggest CAV.   Stable dilated ascending aorta just beyond the anastomosis.        RHC (06/2022)  RA 3/3/3  RV 18/3  PA 18/7/11  PCW 4/5/4  CO Kassi 4.81   CI 2.48    Coronary Angiogram (06/2022):  Prox LAD lesion is 40% stenosed.  1st Mrg lesion is 50% stenosed.  2nd Mrg-2 lesion is 70% stenosed.  2nd Mrg-1 lesion is 60% stenosed.  Prox RCA lesion is 20% stenosed.  Dist RCA lesion is 40% stenosed.  RPDA lesion is 20% stenosed.      Recent Results (from the past 168 hour(s))   Prostate Specific Antigen Screen    Collection Time: 06/19/23  9:39 AM   Result Value Ref Range    Prostate Specific Antigen Screen 2.88 0.00 - 4.50 ng/mL   Tacrolimus by Tandem Mass Spectrometry    Collection Time: 06/19/23  9:39 AM   Result Value Ref Range    Tacrolimus by Tandem Mass Spectrometry 6.1 5.0 - 15.0 ug/L    Tacrolimus Last Dose Date 6/18/2023     Tacrolimus Last Dose Time 10:20 PM    Phosphorus    Collection Time: 06/19/23  9:39 AM   Result Value Ref Range    Phosphorus 3.1 2.5 - 4.5 mg/dL   Magnesium    Collection Time: 06/19/23  9:39 AM   Result Value Ref Range    Magnesium 1.3 (L) 1.7 - 2.3 mg/dL   Lipid panel reflex to direct LDL Fasting    Collection Time: 06/19/23  9:39 AM   Result Value Ref Range    Cholesterol 152 <200 mg/dL    Triglycerides 205 (H) <150 mg/dL    Direct Measure HDL 47 >=40 mg/dL    LDL Cholesterol Calculated 64 <=100 mg/dL    Non HDL Cholesterol 105 <130 mg/dL   ImmuKnow Immune Cell Function    Collection Time: 06/19/23  9:39 AM   Result Value Ref Range    ImmuKnow Immune Cell Function 183 See scan ng/mL   Hemoglobin A1c    Collection Time: 06/19/23  9:39 AM   Result Value Ref Range    Hemoglobin A1C 7.3 (H) <5.7 %   EBV DNA PCR Quantitative Whole Blood    Collection Time: 06/19/23  9:39 AM   Result Value Ref Range    EBV DNA Copies/mL Not Detected Not Detected copies/mL    Comprehensive metabolic panel    Collection Time: 06/19/23  9:39 AM   Result Value Ref Range    Sodium 136 136 - 145 mmol/L    Potassium 4.2 3.4 - 5.3 mmol/L    Chloride 102 98 - 107 mmol/L    Carbon Dioxide (CO2) 20 (L) 22 - 29 mmol/L    Anion Gap 14 7 - 15 mmol/L    Urea Nitrogen 14.0 8.0 - 23.0 mg/dL    Creatinine 1.01 0.67 - 1.17 mg/dL    Calcium 9.7 8.8 - 10.2 mg/dL    Glucose 163 (H) 70 - 99 mg/dL    Alkaline Phosphatase 147 (H) 40 - 129 U/L    AST 30 0 - 45 U/L    ALT 21 0 - 70 U/L    Protein Total 7.4 6.4 - 8.3 g/dL    Albumin 4.4 3.5 - 5.2 g/dL    Bilirubin Total 0.9 <=1.2 mg/dL    GFR Estimate 81 >60 mL/min/1.73m2   CK total    Collection Time: 06/19/23  9:39 AM   Result Value Ref Range     39 - 308 U/L   CBC with platelets    Collection Time: 06/19/23  9:39 AM   Result Value Ref Range    WBC Count 4.7 4.0 - 11.0 10e3/uL    RBC Count 4.41 4.40 - 5.90 10e6/uL    Hemoglobin 13.0 (L) 13.3 - 17.7 g/dL    Hematocrit 40.0 40.0 - 53.0 %    MCV 91 78 - 100 fL    MCH 29.5 26.5 - 33.0 pg    MCHC 32.5 31.5 - 36.5 g/dL    RDW 12.9 10.0 - 15.0 %    Platelet Count 206 150 - 450 10e3/uL   CMV Quantitative, PCR    Collection Time: 06/19/23  9:39 AM    Specimen: Hand, Right; Blood   Result Value Ref Range    CMV DNA IU/mL Not Detected Not Detected IU/mL   HLA Donor Specific Antibody    Collection Time: 06/19/23  9:39 AM   Result Value Ref Range    Donor Identification 06/14/2018     Organ Heart     DSA Present NO     DSA Comments        Flow Single Antigen Beads assays are intended for detection/identification of IgG anti-HLA antibodies. Mfi values may not accurately quantify donor-specific antibody levels in all instances.    DSA Test Method SA EDTA FCS    HLA Donor Specific Antibody    Collection Time: 06/19/23  9:39 AM   Result Value Ref Range    Donor Identification 12/19/2019     Organ Right Kidney     DSA Present NO     DSA Comments        Flow Single Antigen Beads assays are intended for  detection/identification of IgG anti-HLA antibodies. Mfi values may not accurately quantify donor-specific antibody levels in all instances.    DSA Test Method SA EDTA FCS    HLA Behzad Class I, Single Antigen    Collection Time: 06/19/23  9:39 AM   Result Value Ref Range    SA 1 TEST METHOD SA EDTA FCS     SA 1 CELL Class I     SA1 HI RISK BEHZAD None     SA1 MOD RISK BEHZAD None     SA 1  COMMENTS        HLA PRA Test performed by modified testing procedure that may also include pretreatment of serum. Pretreatment may be the addition of fetal calf serum, EDTA, and/or adsorption.  High-risk, MFI > 3,000.  Mod-risk, -3,000.   HLA Behzad Class II, Single Antigen    Collection Time: 06/19/23  9:39 AM   Result Value Ref Range    SA 2 TEST METHOD SA EDTA FCS     SA 2 CELL Class II     SA2 HI RISK BEHZAD None     SA2 MOD RISK BEHZAD None     SA 2 COMMENTS        HLA PRA Test performed by modified testing procedure that may also include pretreatment of serum. Pretreatment may be the addition of fetal calf serum, EDTA, and/or adsorption.  High-risk, MFI > 3,000.  Mod-risk, -3,000.   HLA Behzad, CPRA    Collection Time: 06/19/23  9:39 AM   Result Value Ref Range    UNOS CPRA 0     UNACCEPTABLE ANTIGENS None    EKG 12-lead, tracing only    Collection Time: 06/19/23  9:56 AM   Result Value Ref Range    Systolic Blood Pressure  mmHg    Diastolic Blood Pressure  mmHg    Ventricular Rate 75 BPM    Atrial Rate 75 BPM    HI Interval 184 ms    QRS Duration 92 ms     ms    QTc 428 ms    P Axis 30 degrees    R AXIS 43 degrees    T Axis 31 degrees    Interpretation ECG       Sinus rhythm  Low voltage QRS  Cannot rule out Anterior infarct , age undetermined  Abnormal ECG  When compared with ECG of 13-JUN-2022 09:40,  No significant change was found  Confirmed by MD FILIPPO, ARASH [id number]) on 6/20/2023 11:11:26 PM     EKG 12-lead, tracing only    Collection Time: 06/19/23 11:50 AM   Result Value Ref Range    Systolic Blood Pressure  mmHg     Diastolic Blood Pressure  mmHg    Ventricular Rate 73 BPM    Atrial Rate 73 BPM    CT Interval 188 ms    QRS Duration 86 ms     ms    QTc 420 ms    P Axis 41 degrees    R AXIS 57 degrees    T Axis -2 degrees    Interpretation ECG       Sinus rhythm  Cannot rule out Anterior infarct (cited on or before 19-JUN-2023)  Abnormal ECG  When compared with ECG of 19-JUN-2023 09:56, (unconfirmed)  No significant change was found  Confirmed by MD FILIPPO, ARASH [id number]) on 6/20/2023 10:30:56 PM     XR Chest 2 Views    Collection Time: 06/19/23 12:03 PM   Result Value Ref Range    Radiologist flags Lung nodule          ASSESSMENT AND PLAN:    Mr. uMrray Nicholson is a 68-year-old male status post orthotopic heart transplantation in June with a very complex postoperative course who returns today for followup.     #Orthotopic Heart Transplant (06/2018)     Graft Function: Normal.    No evidence of rejection.  No DSA.     CAV PPx: Patient has a single obstructive coronary artery disease of the OM2 with sequential 80% stenoses. This continues to be unchanged from his prior angiogram that was done 30 days after his transplant. Likely donor disease. Will continue ASA 81mg and Atorvastatin 40mg daily. He has non obstructive disease on his LAD and RCA as well. LDL 35. Elevated triglycerides, discussed need for dietary improvement.     Immunosuppression: Continue Tacrolimus with a goal of 4-6 and  mg bid. Lower dose of Cellcept due to neutropenia in the past. Immunknow is low now. Will check with renal team whether it would be ok from their perspective to decrease MMF to 500 mg bid.     #Hypertension. Off all antihypertensive medications after his renal transplant. BP under control. Will continue to monitor.      #Kidney transplant   Followed by transplant nephrology, on prophylactic Bactrim, renal function stable    # High risk for CMV.  Negative    #Diabetes - On metformin and sliding scale insulin. Followed by endocrinology.  A1c 7.3. D/W the importance of better blood sugar control.      Labs in 6 months and RTC in a year with coronary angiogram. Patient will call in the interim if he has any worsening symptoms.       Total time today was 44 minutes reviewing notes, imaging, labs, patient visit, orders and documentation         Klever Ernst MD   [facility]  Heart Failure, Transplant, and Mechanical Circulatory Support Cardiology   Cardiovascular Division  [facility]   [phone]

## 2023-06-20 ENCOUNTER — LAB REQUISITION (OUTPATIENT)
Dept: LAB | Facility: CLINIC | Age: 68
End: 2023-06-20
Payer: MEDICARE

## 2023-06-20 ENCOUNTER — PATIENT OUTREACH (OUTPATIENT)
Dept: ONCOLOGY | Facility: CLINIC | Age: 68
End: 2023-06-20
Payer: MEDICARE

## 2023-06-20 DIAGNOSIS — R91.8 PULMONARY NODULES: Primary | ICD-10-CM

## 2023-06-20 LAB
ATRIAL RATE - MUSE: 73 BPM
ATRIAL RATE - MUSE: 75 BPM
CMV DNA SPEC NAA+PROBE-ACNC: NOT DETECTED IU/ML
DIASTOLIC BLOOD PRESSURE - MUSE: NORMAL MMHG
DIASTOLIC BLOOD PRESSURE - MUSE: NORMAL MMHG
DONOR IDENTIFICATION: NORMAL
DSA COMMENTS: NORMAL
DSA PRESENT: NO
DSA TEST METHOD: NORMAL
INTERPRETATION ECG - MUSE: NORMAL
INTERPRETATION ECG - MUSE: NORMAL
ORGAN: NORMAL
P AXIS - MUSE: 30 DEGREES
P AXIS - MUSE: 41 DEGREES
PR INTERVAL - MUSE: 184 MS
PR INTERVAL - MUSE: 188 MS
QRS DURATION - MUSE: 86 MS
QRS DURATION - MUSE: 92 MS
QT - MUSE: 382 MS
QT - MUSE: 384 MS
QTC - MUSE: 420 MS
QTC - MUSE: 428 MS
R AXIS - MUSE: 43 DEGREES
R AXIS - MUSE: 57 DEGREES
SYSTOLIC BLOOD PRESSURE - MUSE: NORMAL MMHG
SYSTOLIC BLOOD PRESSURE - MUSE: NORMAL MMHG
T AXIS - MUSE: -2 DEGREES
T AXIS - MUSE: 31 DEGREES
VENTRICULAR RATE- MUSE: 73 BPM
VENTRICULAR RATE- MUSE: 75 BPM

## 2023-06-20 NOTE — PROGRESS NOTES
New Patient: Interventional Pulmonary (Lung nodule) Nurse Navigator Note    [facility]     Referred to (specialty): Interventional Pulmonary (Lung nodule)    Requested provider (if applicable): n/a    Date Referral Received: 6/20/2023    Evaluation for :  New 11mm nodule in heart and kideny transplant pt. See CXR 6/19/2023     Clinical History (per Nurse review of records provided):    **BOOK MARKED**    6/19/23:  XR Chest 2 Views   IMPRESSION:   1.  No acute cardiopulmonary process.  2.  Stable postsurgical changes of heart transplantation.  3.  New 11 mm nodule in the peripheral left midlung. Recommend further evaluation with chest CT.    Records Location (Care Everywhere, Media, etc.): [facility]     Records Needed: none    Additional testing needed prior to consult: CT Chest

## 2023-06-21 LAB
DONOR IDENTIFICATION: NORMAL
DSA COMMENTS: NORMAL
DSA PRESENT: NO
DSA TEST METHOD: NORMAL
EBV DNA # SPEC NAA+PROBE: NOT DETECTED COPIES/ML
ORGAN: NORMAL
SA 1 CELL: NORMAL
SA 1 TEST METHOD: NORMAL
SA 2 CELL: NORMAL
SA 2 TEST METHOD: NORMAL
SA1 HI RISK ABY: NORMAL
SA1 MOD RISK ABY: NORMAL
SA2 HI RISK ABY: NORMAL
SA2 MOD RISK ABY: NORMAL
UNACCEPTABLE ANTIGENS: NORMAL
UNOS CPRA: 0
ZZZSA 1  COMMENTS: NORMAL
ZZZSA 2 COMMENTS: NORMAL

## 2023-06-22 LAB — IMMUKNOW IMMUNE CELL FUNCTION: 183 NG/ML

## 2023-06-22 NOTE — PROGRESS NOTES
Cardiology Clinic Note     Yahir Turcios MD    [facility]    [address] [place]  [address]       Ana Luisa Mcpherson MD    [facility]    [address] [facility] [address]    [place]  [address]       RE: Murray Nicholson   MRN: [id number]   : 1955      Dear Dr. Turcios and Dr. Mcpherson:      We had the pleasure of seeing Mr. Murray Nicholson for followup in our Cardiac Transplant Clinic at the [facility].  As you know, he is a very pleasant 68-year-old male who underwent orthotopic heart transplantation on 2018 and kidney transplant in 2019. His current problem list includes:     1.  Status post orthotopic heart transplantation.   2.  Neutropenia  3.  Oral mucosal ulcer secondary to neutropenia with Klebsiella infection.   4.  Pseudomonas bacteremia, resolved.   5.  Perforation of the small bowel, status post small-bowel resection and ileostomy.   6.  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   7.  Hypertension.   8.  Hyperlipidemia.   9.  End-stage renal disease requiring hemodialysis - S/P Kidney transplant in Dec 2019    He is returning today for his 5-year annual follow-up.  He is overall doing very well.  He has no specific complaints.  He started a part-time job which he has been enjoying it. He has a new girl friend. No recent hospitalizations or ER visits.  He is compliant with his medications.        Current Outpatient Medications   Medication Sig     amLODIPine (NORVASC) 5 MG tablet Take 1 tablet (5 mg) by mouth daily     aspirin 81 MG EC tablet Take 81 mg by mouth every evening     atorvastatin (LIPITOR) 40 MG tablet Take 1 tablet (40 mg) by mouth every evening 90 day supply if able     biotin 1000 MCG TABS tablet Take 1,000 mcg by mouth every morning Patient takes every other day     blood glucose (ACCU-CHEK GUIDE) test strip Use to test blood sugar 3 times daily or as directed.     blood glucose monitoring (ACCU-CHEK FASTCLIX) lancets USE  "TO TEST 3 TIMES DAILY OR AS DIRECTED.     calcium citrate and vitamin D (CITRACAL) 200-250 MG-UNIT TABS per tablet Take 1 tablet by mouth 2 times daily     Calcium Polycarbophil (FIBER) 625 MG tablet Take by mouth every morning     co-enzyme Q-10 30 MG CAPS capsule Take by mouth every morning     loratadine (CLARITIN) 10 MG tablet Take 10 mg by mouth every evening Patient takes at bedtime     magnesium 250 MG tablet Take 1 tablet by mouth every morning     metFORMIN (GLUCOPHAGE XR) 500 MG 24 hr tablet Take 4 tabs every AM     Misc Natural Products (TART CHERRY ADVANCED PO) Take by mouth every morning     Multiple Vitamins-Minerals (HAIR SKIN NAILS PO) Take by mouth every morning     multivitamin (CENTRUM SILVER) tablet Take 1 tablet by mouth every morning     mycophenolate (GENERIC EQUIVALENT) 250 MG capsule Take 3 capsules (750 mg) by mouth 2 times daily     OMEPRAZOLE PO Take 20 mg by mouth every morning     sulfamethoxazole-trimethoprim (BACTRIM) 400-80 MG tablet Take 1 tablet by mouth every morning     sulfamethoxazole-trimethoprim (BACTRIM) 400-80 MG tablet Take 1 tablet by mouth 2 times daily     tacrolimus (GENERIC EQUIVALENT) 0.5 MG capsule Take 1 capsule (0.5 mg) by mouth every evening     tacrolimus (GENERIC EQUIVALENT) 1 MG capsule Take 1 capsule (1 mg) by mouth every morning     Current Facility-Administered Medications   Medication     cilgavimab 300 mg/tixagevimab 300 mg (EVUSHELD) - FULL DOSE     Facility-Administered Medications Ordered in Other Visits   Medication     diatrizoate meglumine-sodium (GASTROGRAFIN/GASTROVIEW) 66-10 % solution 480 mL        REVIEW OF SYSTEMS:  A detailed 10-point review of systems was obtained as described in History of Present Illness.  All other systems are reviewed and are negative.      PHYSICAL EXAMINATION:   /79 (BP Location: Right arm, Patient Position: Sitting, Cuff Size: Adult Regular)   Pulse 96   Ht 1.72 m (5' 7.72\")   Wt 83.5 kg (184 lb)   SpO2 100% "   BMI 28.21 kg/m    He was awake, alert, oriented x3.  He was in no apparent distress.  He was comfortable.  He had no pallor, cyanosis or jaundice.  He had no jugular venous distention.  His carotids were 2+ bilaterally.  Cardiac auscultation revealed normal S1 and S2 with no murmur rub or gallop.  Auscultation of his lungs revealed equal air entry on both sides with no added sounds.  His abdomen was soft with no guarding no tenderness no rigidity no guarding.  He had no focal neurological deficit.  His extremities showed no edema.    Testing:    EKG (06/2023)  Sinus rhythm   Cannot rule out Anterior infarct (cited on or before 19-JUN-2023)   Abnormal ECG   When compared with ECG of 19-JUN-2023 09:56 No significant change was found      Cardiac MRI (06/2023)  1. Heart transplant. The LV is normal in cavity size and wall thickness. The global systolic function is  normal. The LVEF is 67%. There are no regional wall motion abnormalities.     2. The RV is normal in cavity size. The global systolic function is normal. The RVEF is 53%.      3. Both atria are normal in size.     4. There is no significant valvular disease.      5. Late gadolinium enhancement imaging shows no MI, fibrosis or infiltrative disease. T2 mapping relaxation  time is 45ms within expected range.     6. Regadenoson stress perfusion imaging shows no ischemia.     7. There is no pericardial effusion or thickening.     8.  There is no intracardiac thrombus.     9. Ascending aorta is dilated just beyond the anastomosis at 4.2 cm not significantly changed.     CONCLUSIONS:  Heart transplant. Normal biventricular structure and function with LVEF 67% and RVEF 53%. No  MI, fibrosis or infiltrative disease. No perfusion deficits on Regadenoson stress imaging to suggest CAV.   Stable dilated ascending aorta just beyond the anastomosis.        RHC (06/2022)  RA 3/3/3  RV 18/3  PA 18/7/11  PCW 4/5/4  CO Kassi 4.81   CI 2.48    Coronary Angiogram  (06/2022):    Prox LAD lesion is 40% stenosed.    1st Mrg lesion is 50% stenosed.    2nd Mrg-2 lesion is 70% stenosed.    2nd Mrg-1 lesion is 60% stenosed.    Prox RCA lesion is 20% stenosed.    Dist RCA lesion is 40% stenosed.    RPDA lesion is 20% stenosed.      Recent Results (from the past 168 hour(s))   Prostate Specific Antigen Screen    Collection Time: 06/19/23  9:39 AM   Result Value Ref Range    Prostate Specific Antigen Screen 2.88 0.00 - 4.50 ng/mL   Tacrolimus by Tandem Mass Spectrometry    Collection Time: 06/19/23  9:39 AM   Result Value Ref Range    Tacrolimus by Tandem Mass Spectrometry 6.1 5.0 - 15.0 ug/L    Tacrolimus Last Dose Date 6/18/2023     Tacrolimus Last Dose Time 10:20 PM    Phosphorus    Collection Time: 06/19/23  9:39 AM   Result Value Ref Range    Phosphorus 3.1 2.5 - 4.5 mg/dL   Magnesium    Collection Time: 06/19/23  9:39 AM   Result Value Ref Range    Magnesium 1.3 (L) 1.7 - 2.3 mg/dL   Lipid panel reflex to direct LDL Fasting    Collection Time: 06/19/23  9:39 AM   Result Value Ref Range    Cholesterol 152 <200 mg/dL    Triglycerides 205 (H) <150 mg/dL    Direct Measure HDL 47 >=40 mg/dL    LDL Cholesterol Calculated 64 <=100 mg/dL    Non HDL Cholesterol 105 <130 mg/dL   [facility]    Collection Time: 06/19/23  9:39 AM   Result Value Ref Range    ImmuKnow Immune Cell Function 183 See scan ng/mL   Hemoglobin A1c    Collection Time: 06/19/23  9:39 AM   Result Value Ref Range    Hemoglobin A1C 7.3 (H) <5.7 %   EBV DNA PCR Quantitative Whole Blood    Collection Time: 06/19/23  9:39 AM   Result Value Ref Range    EBV DNA Copies/mL Not Detected Not Detected copies/mL   Comprehensive metabolic panel    Collection Time: 06/19/23  9:39 AM   Result Value Ref Range    Sodium 136 136 - 145 mmol/L    Potassium 4.2 3.4 - 5.3 mmol/L    Chloride 102 98 - 107 mmol/L    Carbon Dioxide (CO2) 20 (L) 22 - 29 mmol/L    Anion Gap 14 7 - 15 mmol/L    Urea Nitrogen 14.0 8.0 - 23.0 mg/dL     Creatinine 1.01 0.67 - 1.17 mg/dL    Calcium 9.7 8.8 - 10.2 mg/dL    Glucose 163 (H) 70 - 99 mg/dL    Alkaline Phosphatase 147 (H) 40 - 129 U/L    AST 30 0 - 45 U/L    ALT 21 0 - 70 U/L    Protein Total 7.4 6.4 - 8.3 g/dL    Albumin 4.4 3.5 - 5.2 g/dL    Bilirubin Total 0.9 <=1.2 mg/dL    GFR Estimate 81 >60 mL/min/1.73m2   CK total    Collection Time: 06/19/23  9:39 AM   Result Value Ref Range     39 - 308 U/L   CBC with platelets    Collection Time: 06/19/23  9:39 AM   Result Value Ref Range    WBC Count 4.7 4.0 - 11.0 10e3/uL    RBC Count 4.41 4.40 - 5.90 10e6/uL    Hemoglobin 13.0 (L) 13.3 - 17.7 g/dL    Hematocrit 40.0 40.0 - 53.0 %    MCV 91 78 - 100 fL    MCH 29.5 26.5 - 33.0 pg    MCHC 32.5 31.5 - 36.5 g/dL    RDW 12.9 10.0 - 15.0 %    Platelet Count 206 150 - 450 10e3/uL   CMV Quantitative, PCR    Collection Time: 06/19/23  9:39 AM    Specimen: Hand, Right; Blood   Result Value Ref Range    CMV DNA IU/mL Not Detected Not Detected IU/mL   HLA Donor Specific Antibody    Collection Time: 06/19/23  9:39 AM   Result Value Ref Range    Donor Identification 06/14/2018     Organ Heart     DSA Present NO     DSA Comments        Flow Single Antigen Beads assays are intended for detection/identification of IgG anti-HLA antibodies. Mfi values may not accurately quantify donor-specific antibody levels in all instances.    DSA Test Method SA EDTA FCS    HLA Donor Specific Antibody    Collection Time: 06/19/23  9:39 AM   Result Value Ref Range    Donor Identification 12/19/2019     Organ Right Kidney     DSA Present NO     DSA Comments        Flow Single Antigen Beads assays are intended for detection/identification of IgG anti-HLA antibodies. Mfi values may not accurately quantify donor-specific antibody levels in all instances.    DSA Test Method SA EDTA FCS    HLA Neetu Class I, Single Antigen    Collection Time: 06/19/23  9:39 AM   Result Value Ref Range    SA 1 TEST METHOD SA EDTA FCS     SA 1 CELL Class I      SA1 HI RISK BEHZAD None     SA1 MOD RISK BEHZAD None     SA 1  COMMENTS        HLA PRA Test performed by modified testing procedure that may also include pretreatment of serum. Pretreatment may be the addition of fetal calf serum, EDTA, and/or adsorption.  High-risk, MFI > 3,000.  Mod-risk, -3,000.   HLA Behzad Class II, Single Antigen    Collection Time: 06/19/23  9:39 AM   Result Value Ref Range    SA 2 TEST METHOD SA EDTA FCS     SA 2 CELL Class II     SA2 HI RISK BEHZAD None     SA2 MOD RISK BEHZAD None     SA 2 COMMENTS        HLA PRA Test performed by modified testing procedure that may also include pretreatment of serum. Pretreatment may be the addition of fetal calf serum, EDTA, and/or adsorption.  High-risk, MFI > 3,000.  Mod-risk, -3,000.   HLA Behzad, CPRA    Collection Time: 06/19/23  9:39 AM   Result Value Ref Range    UNOS CPRA 0     UNACCEPTABLE ANTIGENS None    EKG 12-lead, tracing only    Collection Time: 06/19/23  9:56 AM   Result Value Ref Range    Systolic Blood Pressure  mmHg    Diastolic Blood Pressure  mmHg    Ventricular Rate 75 BPM    Atrial Rate 75 BPM    AL Interval 184 ms    QRS Duration 92 ms     ms    QTc 428 ms    P Axis 30 degrees    R AXIS 43 degrees    T Axis 31 degrees    Interpretation ECG       Sinus rhythm  Low voltage QRS  Cannot rule out Anterior infarct , age undetermined  Abnormal ECG  When compared with ECG of 13-JUN-2022 09:40,  No significant change was found  Confirmed by MD FILIPPO, ARASH ([id number]) on 6/20/2023 11:11:26 PM     EKG 12-lead, tracing only    Collection Time: 06/19/23 11:50 AM   Result Value Ref Range    Systolic Blood Pressure  mmHg    Diastolic Blood Pressure  mmHg    Ventricular Rate 73 BPM    Atrial Rate 73 BPM    AL Interval 188 ms    QRS Duration 86 ms     ms    QTc 420 ms    P Axis 41 degrees    R AXIS 57 degrees    T Axis -2 degrees    Interpretation ECG       Sinus rhythm  Cannot rule out Anterior infarct (cited on or before  19-JUN-2023)  Abnormal ECG  When compared with ECG of 19-JUN-2023 09:56, (unconfirmed)  No significant change was found  Confirmed by MD FILIPPO, ARASH ([id number]) on 6/20/2023 10:30:56 PM     XR Chest 2 Views    Collection Time: 06/19/23 12:03 PM   Result Value Ref Range    Radiologist flags Lung nodule          ASSESSMENT AND PLAN:    Mr. Murray Nicholson is a 68-year-old male status post orthotopic heart transplantation in June with a very complex postoperative course who returns today for followup.     #Orthotopic Heart Transplant (06/2018)     Graft Function: Normal.    No evidence of rejection.  No DSA.     CAV PPx: Patient has a single obstructive coronary artery disease of the OM2 with sequential 80% stenoses. This continues to be unchanged from his prior angiogram that was done 30 days after his transplant. Likely donor disease. Will continue ASA 81mg and Atorvastatin 40mg daily. He has non obstructive disease on his LAD and RCA as well. LDL 35. Elevated triglycerides, discussed need for dietary improvement.     Immunosuppression: Continue Tacrolimus with a goal of 4-6 and  mg bid. Lower dose of Cellcept due to neutropenia in the past. Immunknow is low now. Will check with renal team whether it would be ok from their perspective to decrease MMF to 500 mg bid.     #Hypertension. Off all antihypertensive medications after his renal transplant. BP under control. Will continue to monitor.      #Kidney transplant   Followed by transplant nephrology, on prophylactic Bactrim, renal function stable    # High risk for CMV.  Negative    #Diabetes - On metformin and sliding scale insulin. Followed by endocrinology. A1c 7.3. D/W the importance of better blood sugar control.      Labs in 6 months and RTC in a year with coronary angiogram. Patient will call in the interim if he has any worsening symptoms.       Total time today was 44 minutes reviewing notes, imaging, labs, patient visit, orders and documentation          Klever Ernst MD   [facility]  Heart Failure, Transplant, and Mechanical Circulatory Support Cardiology   Cardiovascular Division  [facility]   [phone]

## 2023-06-23 ENCOUNTER — TELEPHONE (OUTPATIENT)
Dept: TRANSPLANT | Facility: CLINIC | Age: 68
End: 2023-06-23
Payer: MEDICARE

## 2023-06-23 DIAGNOSIS — Z94.0 KIDNEY REPLACED BY TRANSPLANT: Primary | ICD-10-CM

## 2023-06-23 DIAGNOSIS — E87.20 METABOLIC ACIDOSIS: ICD-10-CM

## 2023-06-23 DIAGNOSIS — Z94.0 KIDNEY TRANSPLANTED: ICD-10-CM

## 2023-06-23 DIAGNOSIS — Z94.1 HEART REPLACED BY TRANSPLANT (H): ICD-10-CM

## 2023-06-23 RX ORDER — TACROLIMUS 0.5 MG/1
0.5 CAPSULE ORAL 2 TIMES DAILY
Qty: 180 CAPSULE | Refills: 3 | Status: SHIPPED | OUTPATIENT
Start: 2023-06-23 | End: 2024-04-26

## 2023-06-23 RX ORDER — MYCOPHENOLATE MOFETIL 250 MG/1
500 CAPSULE ORAL 2 TIMES DAILY
Qty: 360 CAPSULE | Refills: 3 | Status: SHIPPED | OUTPATIENT
Start: 2023-06-23 | End: 2023-08-25

## 2023-06-23 RX ORDER — TACROLIMUS 1 MG/1
CAPSULE ORAL
Qty: 90 CAPSULE | Refills: 3
Start: 2023-06-23

## 2023-06-23 NOTE — TELEPHONE ENCOUNTER
Reviewed labs with Denita Ernst and Macie  Tac level 6.1 - goal 4-6 (closer to 4-5). Dose decreased from 1/0.5 mg to 0.5 mg BID   Immuknow 183 - MMF decreased from 750 to 500 mg BID, per Dr Quijano check MPA.  Recheck labs in 2 weeks.   Pt called with results, med changes and next steps.

## 2023-06-23 NOTE — TELEPHONE ENCOUNTER
MPA level ordered for check in 2 weeks per Dr. Quijano's recommendation given recent MMF dose reduction.     Autumn Mendez, RN, BSN  Solid Organ Transplant, Post Kidney and Pancreas  Transplant Care Coordinator  [phone]

## 2023-06-27 ENCOUNTER — MYC MEDICAL ADVICE (OUTPATIENT)
Dept: NEPHROLOGY | Facility: CLINIC | Age: 68
End: 2023-06-27
Payer: MEDICARE

## 2023-07-05 ENCOUNTER — MYC MEDICAL ADVICE (OUTPATIENT)
Dept: FAMILY MEDICINE | Facility: CLINIC | Age: 68
End: 2023-07-05
Payer: MEDICARE

## 2023-07-05 ENCOUNTER — MYC MEDICAL ADVICE (OUTPATIENT)
Dept: ENDOCRINOLOGY | Facility: CLINIC | Age: 68
End: 2023-07-05
Payer: MEDICARE

## 2023-07-05 DIAGNOSIS — Z94.0 KIDNEY REPLACED BY TRANSPLANT: ICD-10-CM

## 2023-07-05 DIAGNOSIS — E11.29 TYPE 2 DIABETES MELLITUS WITH OTHER DIABETIC KIDNEY COMPLICATION, WITHOUT LONG-TERM CURRENT USE OF INSULIN (H): Primary | ICD-10-CM

## 2023-07-05 DIAGNOSIS — E11.29 TYPE II DIABETES MELLITUS WITH RENAL MANIFESTATIONS (H): Primary | ICD-10-CM

## 2023-07-05 NOTE — TELEPHONE ENCOUNTER
Pt request: Accu-Chek Guide test kit      Last Written Prescription Date:  Not on active or past med list    Last Office Visit : 2-10-3  Future Office visit:  8-11-23    Routing refill request to provider for review/approval because:  Medication is reported/historical    [id number] - [place], [place] - [address] AT [facility]

## 2023-07-06 ENCOUNTER — THERAPY VISIT (OUTPATIENT)
Dept: PHYSICAL THERAPY | Facility: CLINIC | Age: 68
End: 2023-07-06
Attending: NURSE PRACTITIONER
Payer: MEDICARE

## 2023-07-06 DIAGNOSIS — N32.81 OVERACTIVE BLADDER: ICD-10-CM

## 2023-07-06 PROCEDURE — 97535 SELF CARE MNGMENT TRAINING: CPT | Mod: GP | Performed by: PHYSICAL THERAPIST

## 2023-07-06 PROCEDURE — 97161 PT EVAL LOW COMPLEX 20 MIN: CPT | Mod: GP | Performed by: PHYSICAL THERAPIST

## 2023-07-06 NOTE — PROGRESS NOTES
"PHYSICAL THERAPY EVALUATION  Type of Visit: Evaluation    See electronic medical record for Abuse and Falls Screening details.    Subjective      Presenting condition or subjective complaint: too frequent urination at night interrupting my sleep     Describes a feeling of fullness in lower abdomen (above pubic bone and 3-4 inches below umbilicus)  that wakes him 3-4 times per night, has decreased to 1-2 after decreasing overall fluid intake.  He has had nights of waking every hour.reports not having as much urge when standing or during the work day versus other positions of sitting or lying.    He notes this has been going on several years during dialysis prior to transplant, but due to other functional limitations it was not as impactful.  Now that he is feeling very good, he is noting frequency/urgency impacting sleep and energy.  Date of onset: 04/25/23 (MD order)    Relevant medical history: Diabetes; Heart problems; High blood pressure Per MD visit note: \"Symptoms most consistent with overactive bladder.  He has no hesitancy and feels he empties his bladder completely.  We discussed with decrease bladder volume when on dialysis, this can contribute to overactive bladder.  Recommended trying to increase time intervals between voids to let bladder stretch a little more.  Can also decrease fluids closer to bedtime.  He is open to pelvic floor therapy\"  Dates & types of surgery: 6/14/18 received new heart. 12/19/19 received new kidney Hernia repair x 2 lower abdomen.    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  Hobbies/Interests: hiking, reading going to concert and plays     Exercise: does a series of stretches and kegel exercises every morning.    Patient goals for therapy: sleep through the night.    Pain assessment: See objective evaluation for additional pain details     Objective      PELVIC EVALUATION  ADDITIONAL HISTORY:  Sex assigned at birth: Male  Gender identity: Male    Pronouns: He/Him/His      Bladder History:  Feels bladder filling: No  Triggers for feeling of inability to wait to go to the bathroom: No    How long can you wait to urinate: an hour? DK depends on distraction/work  Gets up at night to urinate: Yes 3 -5  Can stop the flow of urine when urinating: Sometimes  Volume of urine usually released: Medium   Other issues:    Number of bladder infections in last 12 months:    Fluid intake per day: 16 16    Medications taken for bladder: No     Activities causing urine leak: Hurrying to the bathroom due to a strong urge to urinate (pee)    Amount of urine typically leaked: DK  Pads used to help with leaking: No        Bowel History:  Frequency of bowel movement: 3xs a day  Consistency of stool: Hard  with use of fiber supplement, softer stools since colon infection/resection   Ignores the urge to defecate: Sometimes because if I do it builds up more so I can have a better bowel movement without fecal smearing.  Other bowel issues:  Does not report straining  Length of time spent trying to have a bowel movement:  not reported    Sexual Function History:  Sexual orientation: Straight    Sexually active: Yes  Lubrication used: No No  Pelvic pain:      Pain or difficulty with orgasms/erection/ejaculation: No    State of menopause:    Hormone medications: No      Are you currently pregnant: No, Do you get regular exercise: Yes, I  do this type of exercise: walking, Have you tried pelvic floor strengthening exercises for 4 weeks: No, Do you have any history of trauma that is relevant to your care that you d like to share: No    Discussed reason for referral regarding pelvic health needs and external/internal pelvic floor muscle examination with patient/guardian.  Opportunity provided to ask questions and verbal consent for assessment and intervention was given.    PAIN: Pain Location: lower abdominal  POSTURE: Standing Posture: Lordosis decreased  LUMBAR SCREEN: AROM WNL  HIP SCREEN:  Strength:    Functional Strength Testing:     PELVIC/SI SCREEN:  Sacroiliac Provocation Test: neg   PAIN PROVOCATION TEST: Hip -  PELVIS/SI SPECIAL TESTS: WNL  BREATHING SYMMETRY: Asymmetrical, Decreased rib cage mobility    PELVIC EXAM  External Visual Inspection:  next    Integumentary:   next    External Digital Palpation per Perineum:   Levator ani: next    Scar:   Location/Type: next  Mobility: next    Internal Digital Palpation:  Per Vagina:      Per Rectum:  next if needed      Pelvic Organ Prolapse:       ABDOMINAL ASSESSMENT  Diastasis Rectus Abdominis (PEPE):      Abdominal Activation/Strength: next    Scar:   Location/Type: next  Mobility: next    Fascial Tension/Restriction/Tone: next    BIOFEEDBACK:  Next     Pt education provided today.  Full assessment deferred due to patient time constraint.  Assessment & Plan   CLINICAL IMPRESSIONS   Medical Diagnosis: OAB    Treatment Diagnosis: PFD   Impression/Assessment: Patient is a 68 year old male with pelvic complaints.  The following significant findings have been identified: Pain, Decreased ROM/flexibility, Decreased strength, Impaired muscle performance and Decreased activity tolerance. These impairments interfere with their ability to perform self care tasks as compared to previous level of function.     Clinical Decision Making (Complexity):   Clinical Presentation: Stable/Uncomplicated  Clinical  Presentation Rationale: based on medical and personal factors listed in PT evaluation  Clinical Decision Making (Complexity): Low complexity    PLAN OF CARE  Treatment Interventions:  Modalities: Biofeedback, Ultrasound  Interventions: Manual Therapy, Neuromuscular Re-education, Therapeutic Activity, Therapeutic Exercise, Self-Care/Home Management    Long Term Goals     PT Goal 2  Goal Identifier: Bladder hygiene  Goal Description: In 2 weeks pt will demonstrate understanding of bladder hygiene to include 6-8 daily voids, fluid intake to promote light lemonade urine color, avoidance of bladder irritants, and factors associated with urgency/frequency after completion and review of bladder diary with provider.  Rationale: to maximize safety and independence with self cares  Target Date: 07/21/23  PT Goal 3  Goal Identifier: Self-care  Goal Description: In 6 weeks pt will verbalize/demonstrate pro-active self-care measures to improve pelvic floor function including bladder/bowel hygiene; activities to reduce stress and anxiety; and prescribed exercises for pelvic floor function with the assistance of educational materials from physical therapy sessions.  Rationale: to maximize safety and independence with self cares  Target Date: 08/21/23  PT Goal 4  Goal Identifier: Nocturia  Goal Description: In 12 weeks pt will demonstrate <1 night waking for urination over a period of one month.  Rationale: to maximize safety and independence with self cares;to maximize safety and independence with performance of ADLs and functional tasks;to maximize safety and independence within the home  Target Date: 10/03/23      Frequency of Treatment: 1x/week  Duration of Treatment: 12 weeks    Recommended Referrals to Other Professionals: Physical Therapy  Education Assessment:   Learner/Method: Patient  Education Comments: video and HO    Risks and benefits of evaluation/treatment have been explained.   Patient/Family/caregiver agrees with  Plan of Care.     Evaluation Time:     PT Eval, Low Complexity Minutes (83889): 30      Signing Clinician: GARRICK Helm [facility]                                                                                   OUTPATIENT PHYSICAL THERAPY      PLAN OF TREATMENT FOR OUTPATIENT REHABILITATION   Patient's Last Name, First Name, Murray Brewer YOB: 1955   Provider's Name   [facility]   Medical Record No.  [id number]     Onset Date: 04/25/23 (MD order)  Start of Care Date: 07/06/23     Medical Diagnosis:  OAB      PT Treatment Diagnosis:  PFD Plan of Treatment  Frequency/Duration: 1x/week/ 12 weeks    Certification date from 07/06/23 to 10/03/23         See note for plan of treatment details and functional goals     Gema More PT                         I CERTIFY THE NEED FOR THESE SERVICES FURNISHED UNDER        THIS PLAN OF TREATMENT AND WHILE UNDER MY CARE     (Physician attestation of this document indicates review and certification of the therapy plan).                  Referring Provider:  Alicia Shearer      Initial Assessment  See Epic Evaluation- Start of Care Date: 07/06/23

## 2023-07-07 PROBLEM — N32.81 OVERACTIVE BLADDER: Status: ACTIVE | Noted: 2023-07-07

## 2023-07-10 RX ORDER — LANCETS
EACH MISCELLANEOUS
Qty: 300 EACH | Refills: 6 | Status: SHIPPED | OUTPATIENT
Start: 2023-07-10

## 2023-07-10 RX ORDER — GLUCOSAMINE HCL/CHONDROITIN SU 500-400 MG
CAPSULE ORAL
Qty: 300 EACH | Refills: 6 | Status: SHIPPED | OUTPATIENT
Start: 2023-07-10

## 2023-07-10 RX ORDER — LANCETS
EACH MISCELLANEOUS
Qty: 300 EACH | Refills: 1 | Status: SHIPPED | OUTPATIENT
Start: 2023-07-10

## 2023-07-10 RX ORDER — LANCING DEVICE/LANCETS
KIT MISCELLANEOUS
Qty: 1 EACH | Refills: 0 | Status: SHIPPED | OUTPATIENT
Start: 2023-07-10 | End: 2024-09-23

## 2023-07-12 ENCOUNTER — LAB (OUTPATIENT)
Dept: LAB | Facility: CLINIC | Age: 68
End: 2023-07-12
Payer: MEDICARE

## 2023-07-12 DIAGNOSIS — Z94.0 KIDNEY REPLACED BY TRANSPLANT: ICD-10-CM

## 2023-07-12 LAB
TACROLIMUS BLD-MCNC: 4.3 UG/L (ref 5–15)
TME LAST DOSE: ABNORMAL H
TME LAST DOSE: ABNORMAL H

## 2023-07-12 PROCEDURE — 99000 SPECIMEN HANDLING OFFICE-LAB: CPT | Performed by: PATHOLOGY

## 2023-07-12 PROCEDURE — 36415 COLL VENOUS BLD VENIPUNCTURE: CPT | Performed by: PATHOLOGY

## 2023-07-12 PROCEDURE — 80197 ASSAY OF TACROLIMUS: CPT | Performed by: INTERNAL MEDICINE

## 2023-07-13 ENCOUNTER — THERAPY VISIT (OUTPATIENT)
Dept: PHYSICAL THERAPY | Facility: CLINIC | Age: 68
End: 2023-07-13
Attending: NURSE PRACTITIONER
Payer: MEDICARE

## 2023-07-13 DIAGNOSIS — N32.81 OVERACTIVE BLADDER: Primary | ICD-10-CM

## 2023-07-13 PROCEDURE — 97535 SELF CARE MNGMENT TRAINING: CPT | Mod: GP | Performed by: PHYSICAL THERAPIST

## 2023-07-20 ENCOUNTER — LAB (OUTPATIENT)
Dept: LAB | Facility: CLINIC | Age: 68
End: 2023-07-20
Payer: MEDICARE

## 2023-07-20 ENCOUNTER — THERAPY VISIT (OUTPATIENT)
Dept: PHYSICAL THERAPY | Facility: CLINIC | Age: 68
End: 2023-07-20
Attending: NURSE PRACTITIONER
Payer: MEDICARE

## 2023-07-20 DIAGNOSIS — Z94.0 KIDNEY REPLACED BY TRANSPLANT: ICD-10-CM

## 2023-07-20 DIAGNOSIS — D63.1 ANEMIA IN CHRONIC RENAL DISEASE: Primary | ICD-10-CM

## 2023-07-20 DIAGNOSIS — Z94.1 HEART REPLACED BY TRANSPLANT (H): ICD-10-CM

## 2023-07-20 DIAGNOSIS — N18.9 ANEMIA IN CHRONIC RENAL DISEASE: Primary | ICD-10-CM

## 2023-07-20 DIAGNOSIS — N32.81 OVERACTIVE BLADDER: Primary | ICD-10-CM

## 2023-07-20 LAB
ANION GAP SERPL CALCULATED.3IONS-SCNC: 14 MMOL/L (ref 7–15)
BUN SERPL-MCNC: 14.6 MG/DL (ref 8–23)
CALCIUM SERPL-MCNC: 10.1 MG/DL (ref 8.8–10.2)
CHLORIDE SERPL-SCNC: 100 MMOL/L (ref 98–107)
CREAT SERPL-MCNC: 1.1 MG/DL (ref 0.67–1.17)
CREAT UR-MCNC: 185 MG/DL
DEPRECATED HCO3 PLAS-SCNC: 23 MMOL/L (ref 22–29)
GFR SERPL CREATININE-BSD FRML MDRD: 73 ML/MIN/1.73M2
GLUCOSE SERPL-MCNC: 238 MG/DL (ref 70–99)
MAGNESIUM SERPL-MCNC: 1.5 MG/DL (ref 1.7–2.3)
MICROALBUMIN UR-MCNC: <12 MG/L
MICROALBUMIN/CREAT UR: NORMAL MG/G{CREAT}
PHOSPHATE SERPL-MCNC: 3.4 MG/DL (ref 2.5–4.5)
POTASSIUM SERPL-SCNC: 4.3 MMOL/L (ref 3.4–5.3)
SODIUM SERPL-SCNC: 137 MMOL/L (ref 136–145)

## 2023-07-20 PROCEDURE — 84100 ASSAY OF PHOSPHORUS: CPT

## 2023-07-20 PROCEDURE — 80048 BASIC METABOLIC PNL TOTAL CA: CPT

## 2023-07-20 PROCEDURE — 97530 THERAPEUTIC ACTIVITIES: CPT | Mod: GP | Performed by: PHYSICAL THERAPIST

## 2023-07-20 PROCEDURE — 80180 DRUG SCRN QUAN MYCOPHENOLATE: CPT

## 2023-07-20 PROCEDURE — 36415 COLL VENOUS BLD VENIPUNCTURE: CPT

## 2023-07-20 PROCEDURE — 82043 UR ALBUMIN QUANTITATIVE: CPT

## 2023-07-20 PROCEDURE — 82570 ASSAY OF URINE CREATININE: CPT

## 2023-07-20 PROCEDURE — 83735 ASSAY OF MAGNESIUM: CPT

## 2023-07-21 LAB
MYCOPHENOLATE SERPL LC/MS/MS-MCNC: 1.13 MG/L (ref 1–3.5)
MYCOPHENOLATE-G SERPL LC/MS/MS-MCNC: 27.4 MG/L (ref 30–95)
TME LAST DOSE: ABNORMAL H
TME LAST DOSE: ABNORMAL H

## 2023-07-21 NOTE — PROGRESS NOTES
07/20/23 0500   Appointment Info   Signing clinician's name / credentials Rosa More, PT   Total/Authorized Visits E+T   Visits Used 3   Medical Diagnosis OAB   PT Tx Diagnosis PFD   Quick Adds Certification   Progress Note/Certification   Start of Care Date 07/06/23   Onset of illness/injury or Date of Surgery 04/25/23  (MD order)   Therapy Frequency 1x/week   Predicted Duration 12 weeks   Certification date from 07/06/23   Certification date to 10/03/23   Progress Note Due Date 09/05/23   Progress Note Completed Date 07/07/23   GOALS   PT Goals 2;3;4   PT Goal 2   Goal Identifier Bladder hygiene   Goal Description In 2 weeks pt will demonstrate understanding of bladder hygiene to include 6-8 daily voids, fluid intake to promote light lemonade urine color, avoidance of bladder irritants, and factors associated with urgency/frequency after completion and review of bladder diary with provider.   Rationale to maximize safety and independence with self cares   Goal Progress MET   Target Date 07/21/23   Date Met 07/20/23   PT Goal 3   Goal Identifier Self-care   Goal Description In 6 weeks pt will verbalize/demonstrate pro-active self-care measures to improve pelvic floor function including bladder/bowel hygiene; activities to reduce stress and anxiety; and prescribed exercises for pelvic floor function with the assistance of educational materials from physical therapy sessions.   Rationale to maximize safety and independence with self cares   Goal Progress MET   Target Date 07/21/23   Date Met 07/20/23   PT Goal 4   Goal Identifier Nocturia   Goal Description In 12 weeks pt will demonstrate <1 night waking for urination over a period of one month.   Rationale to maximize safety and independence with self cares;to maximize safety and independence with performance of ADLs and functional tasks;to maximize safety and independence within the home   Target Date 10/03/23   Date Met 07/20/23   Subjective Report    Subjective Report Pt reports improved bladder schedule and 1 night waking event.   Objective Measures   Objective Measures Objective Measure 1   Objective Measure 1   Objective Measure Bladder Hygiene   Details normal day/night voids   Therapeutic Activity   Ther Act 1 - Details Self maintenence of bladder and bowel function   Ther Act 2 Urge Suppression Activities: patient education in techniques and strategies to manage and control  urges or impulses in order to prevent or reduce unwanted movements or behaviors that cause leakage.   Ther Act 2 - Details 1. Understanding urges: Patient education about the nature of urges and the neurological processes involved. Urge is a strong desire or impulse to perform a specific action or movement.    2. Identifying triggers: recognize the specific triggers that can lead to unwanted movements or behaviors.    3. Breath control: deep breathing exercises can help reduce stress and anxiety, which can in turn help suppress urges. Practice diaphragmatic breathing and learn how to incorporate it into daily routine.    4. Distraction techniques: Divert attention away from the urge or impulse. This can include mentally stimulating activities, such as puzzles or reading, or physically engaging in activities that require focus, such as squeezing a stress ball or doing exercises.    5. Relaxation techniques: progressive muscle relaxation or guided imagery, to help reduce tension and promote a sense of calmness. These techniques can be helpful in managing and suppressing urges.    6. Cognitive strategies: identify and challenge any negative or irrational thoughts associated with their urges. Reframe thoughts and develop more positive and adaptive thinking patterns.    7. Gradual exposure and response prevention: gradually expose yourself triggers or situations that typically elicit urges, while simultaneously practicing strategies to resist or suppress the urge. This can help build  resilience and develop better control over impulses.   Ther Act 3 Quick Flick techinique for urge:In sitting or standing perform 5 quick contractions of the pelvic floor muscle to reduce urge.  Try different positions, then with movement so you can use when you have urge and making your way to the bathroom.   Skilled Intervention Identification of causes of urge, education and cuing   Ther Act 1 D/C education   Therapeutic Activities Ther Act 2;Ther Act 3;Ther Act 4   Therapeutic Activities: dynamic activities to improve functional performance minutes (64270) 15   Education   Learner/Method Patient   Education Comments video and HO   Plan   Home program see ptrx   Plan for next session review changes after bladder re-training, PFM assessment if needed   Comments   Comments strong behavioral component to nocturia   Total Session Time   Timed Code Treatment Minutes 15   Total Treatment Time (sum of timed and untimed services) 15       DISCHARGE  Reason for Discharge: Patient has met all goals.    Equipment Issued: NA    Discharge Plan: Patient to continue home program.    Referring Provider:  Alicia Shearer

## 2023-07-28 ENCOUNTER — TELEPHONE (OUTPATIENT)
Dept: FAMILY MEDICINE | Facility: CLINIC | Age: 68
End: 2023-07-28
Payer: MEDICARE

## 2023-07-28 NOTE — TELEPHONE ENCOUNTER
Forms/Letter Request    Type of form/letter: Diabetic standard written order    Have you been seen for this request: N/A    Do we have the form/letter: Yes: placed in care team 2 providers form folder    Who is the form from? [employer] (if other please explain)    Where did/will the form come from? form was faxed in    When is form/letter needed by: non given    How would you like the form/letter returned: Fax : [phone]

## 2023-08-03 NOTE — TELEPHONE ENCOUNTER
RECORDS STATUS - ALL OTHER DIAGNOSIS    Pulmonary Nodules   Heart replaced by transplant (H   RECORDS RECEIVED FROM:    Appt Date: 8/17/2023 with Dr. Murguia   NOTES STATUS DETAILS   OFFICE NOTE from referring provider     DISCHARGE SUMMARY from hospital     DISCHARGE REPORT from the ER     OPERATIVE REPORT     MEDICATION LIST Complete [facility]   CLINICAL TRIAL TREATMENTS TO DATE     LABS     PATHOLOGY REPORTS     ANYTHING RELATED TO DIAGNOSIS Complete Labs last updated on 7/20/2023    GENONOMIC TESTING     TYPE:     IMAGING (NEED IMAGES & REPORT)     CT SCANS Scheduled CT Chest 8/8/2023    MRI     Xray Chest Complete 6/19/2023, 4/21/2023   MAMMO     ULTRASOUND     PET

## 2023-08-04 NOTE — PROGRESS NOTES
Outcome for 08/04/23 8:40 AM: [employer] message sent  Paulina Curiel MA  Outcome for 08/09/23 10:55 AM: Glucose readings sent via [employer]  Paulina Curiel MA    Patient is showing 5/5 MNCM met.   Paulina Curiel MA

## 2023-08-07 ENCOUNTER — TELEPHONE (OUTPATIENT)
Dept: EDUCATION SERVICES | Facility: CLINIC | Age: 68
End: 2023-08-07
Payer: MEDICARE

## 2023-08-08 ENCOUNTER — ANCILLARY PROCEDURE (OUTPATIENT)
Dept: CT IMAGING | Facility: CLINIC | Age: 68
End: 2023-08-08
Attending: INTERNAL MEDICINE
Payer: MEDICARE

## 2023-08-08 DIAGNOSIS — R91.8 PULMONARY NODULES: ICD-10-CM

## 2023-08-08 PROCEDURE — 71250 CT THORAX DX C-: CPT | Mod: ME | Performed by: RADIOLOGY

## 2023-08-08 PROCEDURE — G1010 CDSM STANSON: HCPCS | Mod: GC | Performed by: RADIOLOGY

## 2023-08-11 ENCOUNTER — VIRTUAL VISIT (OUTPATIENT)
Dept: ENDOCRINOLOGY | Facility: CLINIC | Age: 68
End: 2023-08-11
Payer: MEDICARE

## 2023-08-11 DIAGNOSIS — E11.22 TYPE 2 DIABETES MELLITUS WITH CHRONIC KIDNEY DISEASE, WITHOUT LONG-TERM CURRENT USE OF INSULIN, UNSPECIFIED CKD STAGE (H): Primary | ICD-10-CM

## 2023-08-11 PROBLEM — E11.9 DIABETES MELLITUS, TYPE 2 (H): Status: ACTIVE | Noted: 2023-08-11

## 2023-08-11 PROCEDURE — 99214 OFFICE O/P EST MOD 30 MIN: CPT | Mod: VID | Performed by: PHYSICIAN ASSISTANT

## 2023-08-11 NOTE — LETTER
8/11/2023       RE: Murray Nicholson  [address]  [place] [address]     Dear Colleague,    Thank you for referring your patient, Mruray Nicholson, to the [facility] ENDOCRINOLOGY [facility] at [facility]. Please see a copy of my visit note below.    Outcome for 08/04/23 8:40 AM: [employer] message sent  Paulina Curiel MA  Outcome for 08/09/23 10:55 AM: Glucose readings sent via [employer]  Paulina Curiel MA    Patient is showing 5/5 [place] met.   Paulina Curiel MA        Virtual Visit Details    Type of service:  Video Visit   Video Start Time:   Video End Time:    Originating Location (pt. Location):     Distant Location (provider location):    Platform used for Video Visit:   Answers submitted by the patient for this visit:  Symptoms you have experienced in the last 30 days (Submitted on 8/9/2023)  General Symptoms: No  Skin Symptoms: No  HENT Symptoms: No  EYE SYMPTOMS: No  HEART SYMPTOMS: No  LUNG SYMPTOMS: No  INTESTINAL SYMPTOMS: No  URINARY SYMPTOMS: No  REPRODUCTIVE SYMPTOMS: No  SKELETAL SYMPTOMS: No  BLOOD SYMPTOMS: No  NERVOUS SYSTEM SYMPTOMS: No  MENTAL HEALTH SYMPTOMS: No      Time of start: 10:32 am  Time of end: 10:49 am  Total duration of video visit: 17 minutes  Providers location: offsite.  Patients location: [place].    [facility]  Murray Nicholson is a 68 year old male with type 2 diabetes mellitus. Video visit for diabetes follow up today.  Pt last seen by me in 2/2023 and seen by Dr. Merida in Aug 2021.  Pt gives a hx of type 2 diabetes > 20 years complicated by ESRD s/p kidney transplant in Dec 2019.   Pt denies known hx of retinopathy or sx of neuropathy.  He has hx CAD s/p heart transplant in June 2018.  Pt also has hx of HTN, hyperlipidemia, ascending aortic aneurysm and GERD.  For his diabetes, he is currently taking Metformin 500 mg 4 tabs in am.  Most recent A1C was higher at 7.3 % on 6/19/2023.   Pt's previous A1C ws 6.9 % on  1/31/2023.  He provided me with a few blood sugar readings today which I scanned in his note below.  Most of these blood sugar values are fasting.  On ROS today, he is eating healthy and smaller portions and less sweets.  He remains activity.  Denies blurred vision, n/v, SOB at rest, cough, fever, chest pain or abd pain.  No diarrhea, dysuria or hematuria.  He denies sx of neuropathy or foot ulcers.    Diabetes Care  Retinopathy: none per patient; pt seen by Oph in June 2022.He plans to see Oph in Sept 2023.  Nephropathy:hx of ESRD s/p kidney transplant in 12/2019. Urine microalbuminuria negative in 7/2023.  Neuropathy: none per patient.  Foot Exam: no exam today.  Taking aspirin: yes.  Lipids: LDL 64 in 6/2023. Pt taking Lipitor.  Insulin: none.  DM meds: Metformin.  ADDING low dose Jardiance today.  Testing: glucose meter.          ROS  See under HPI.      Allergies  Allergies   Allergen Reactions    Cats Shortness Of Breath and Anaphylaxis    Penicillins Hives    Shrimp Shortness Of Breath and Anaphylaxis    Isosorbide Nitrate      hypotension    Norco [Hydrocodone-Acetaminophen] Nausea and Vomiting       Medications  Current Outpatient Medications   Medication Sig Dispense Refill    alcohol swab prep pads Use to swab area of injection/davida as directed. 300 each 6    amLODIPine (NORVASC) 5 MG tablet Take 1 tablet (5 mg) by mouth daily 90 tablet 3    aspirin 81 MG EC tablet Take 81 mg by mouth every evening      atorvastatin (LIPITOR) 40 MG tablet Take 1 tablet (40 mg) by mouth every evening 90 day supply if able 90 tablet 3    biotin 1000 MCG TABS tablet Take 1,000 mcg by mouth every morning Patient takes every other day      blood glucose (ACCU-CHEK GUIDE) test strip Use to test blood sugar 3 times daily or as directed. 300 strip 1    blood glucose (ACCU-CHEK SOFTCLIX) lancing device Lancing device to be used with lancets. Test blood sugar 3 times daily or as directed 1 each 0    blood glucose (NO BRAND  SPECIFIED) test strip Use to test blood sugar 3 times daily or as directed. Any covered brand that works with meter. 300 strip 1    blood glucose monitoring (ACCU-CHEK FASTCLIX) lancets USE TO TEST 3 TIMES DAILY OR AS DIRECTED. 300 each 1    blood glucose monitoring (NO BRAND SPECIFIED) meter device kit Use to test blood sugar 3 times daily or as directed.  Any covered brand. 1 kit 0    blood glucose monitoring (SOFTCLIX) lancets Use to test blood sugar 3 times daily. 300 each 6    calcium citrate and vitamin D (CITRACAL) 200-250 MG-UNIT TABS per tablet Take 1 tablet by mouth 2 times daily      Calcium Polycarbophil (FIBER) 625 MG tablet Take by mouth every morning      co-enzyme Q-10 30 MG CAPS capsule Take by mouth every morning      loratadine (CLARITIN) 10 MG tablet Take 10 mg by mouth every evening Patient takes at bedtime      magnesium 250 MG tablet Take 1 tablet by mouth every morning      metFORMIN (GLUCOPHAGE XR) 500 MG 24 hr tablet Take 4 tabs every  tablet 3    Misc Natural Products (TART CHERRY ADVANCED PO) Take by mouth every morning      Multiple Vitamins-Minerals (HAIR SKIN NAILS PO) Take by mouth every morning      mycophenolate (GENERIC EQUIVALENT) 250 MG capsule Take 2 capsules (500 mg) by mouth 2 times daily 360 capsule 3    OMEPRAZOLE PO Take 20 mg by mouth every morning      sulfamethoxazole-trimethoprim (BACTRIM) 400-80 MG tablet Take 1 tablet by mouth every morning 90 tablet 3    tacrolimus (GENERIC EQUIVALENT) 0.5 MG capsule Take 1 capsule (0.5 mg) by mouth 2 times daily 180 capsule 3    tacrolimus (GENERIC EQUIVALENT) 1 MG capsule ON HOLD due to dose change 90 capsule 3    thin (NO BRAND SPECIFIED) lancets Use with lanceting device 3x daily. Any covered brand that works with lancing device. 300 each 1       Family History  family history includes C.A.D. in his father; Cerebrovascular Disease in his father; Diabetes in his father; Hypertension in his father.    Social History   reports  that he quit smoking about 29 years ago. His smoking use included cigarettes. He started smoking about 39 years ago. He has a 20.00 pack-year smoking history. He has never used smokeless tobacco. He reports current alcohol use. He reports that he does not use drugs.     Past Medical History  Past Medical History:   Diagnosis Date    (HFpEF) heart failure with preserved ejection fraction (H)     Allergic rhinitis, cause unspecified     Anemia of chronic kidney failure     Aortic stenosis 08/13/2008    Overview:  Bicuspid aortic valve    AS (aortic stenosis)     Ascending aortic aneurysm (H)     Bicuspid aortic valve     Bilateral hand pain 06/01/2021    CAD (coronary artery disease)     Congestive heart failure, unspecified     Coronary artery disease involving native artery of transplanted heart without angina pectoris 07/10/2014    Dialysis patient (H)     [facility]    Dyslipidemia     Esophageal reflux     ESRD (end stage renal disease) (H)     Hearing problem     Heart replaced by transplant (H) 12/10/2018    Hypersomnia with sleep apnea, unspecified     Hypertension     Ileostomy status (H)     Immunosuppression (H)     MGUS (monoclonal gammopathy of unknown significance)     Mitral regurgitation     SHEELA (obstructive sleep apnea)     No CPAP    Pneumonia     Sigmoid diverticulitis 08/14/2018    Status post coronary angiogram 06/28/2019    Systolic heart failure (H)     Type 2 diabetes mellitus (H)     Ventral hernia without obstruction or gangrene        Past Surgical History:   Procedure Laterality Date    BIOPSY      CARDIAC SURGERY      COLONOSCOPY N/A 5/3/2018    Procedure: COLONOSCOPY;  colonoscopy ;  Surgeon: Ammon Castillo MD;  Location:  GI    COLONOSCOPY N/A 5/26/2023    Procedure: COLONOSCOPY, WITH POLYPECTOMY via bx forceps;  Surgeon: Francisco Bland MD;  Location: [facility]    CREATE FISTULA ARTERIOVENOUS UPPER EXTREMITY BOVINE Left 5/8/2019    Procedure: Left Upper Extremity Arteriovenous  Bovine Graft Creation;  Surgeon: Calin Cheney MD;  Location: [facility]    CV CORONARY ANGIOGRAM N/A 6/28/2019    Procedure: CV CORONARY ANGIOGRAM;  Surgeon: Montrell Posada MD;  [facility]    CV CORONARY ANGIOGRAM N/A 7/15/2020    Procedure: CV CORONARY ANGIOGRAM;  Surgeon: Marcelo Ramírez MD;  [facility]    CV CORONARY ANGIOGRAM N/A 6/7/2021    Procedure: CV CORONARY ANGIOGRAM;  Surgeon: Giblerto Mccann MD;  [facility]    CV CORONARY ANGIOGRAM N/A 6/13/2022    Procedure: Coronary Angiogram;  Surgeon: Marcelo Ramírez MD;  [facility]    CV HEART BIOPSY N/A 2/1/2019    Procedure: HBX;  Surgeon: Montrell Posada MD;  [facility]    CV HEART BIOPSY N/A 3/22/2019    Procedure: HBX, RIJV ACCESS;  Surgeon: Jordan Fox MD;  [facility]    CV HEART BIOPSY N/A 6/28/2019    Procedure: CV HEART BIOPSY;  Surgeon: Montrell Posada MD;  [facility]    CV HEART BIOPSY N/A 10/28/2019    Procedure: CV HEART BIOPSY;  Surgeon: Marcelo Ramírez MD;  [facility]    CV HEART BIOPSY N/A 2/6/2020    Procedure: CV HEART BIOPSY;  Surgeon: Montrell Posada MD;  [facility]    CV HEART BIOPSY N/A 7/15/2020    Procedure: CV HEART BIOPSY;  Surgeon: Marcelo Ramírez MD;  [facility]    CV RIGHT HEART CATH MEASUREMENTS RECORDED N/A 6/28/2019    Procedure: CV RIGHT HEART CATH;  Surgeon: Montrell Posada MD;  [facility]    CV RIGHT HEART CATH MEASUREMENTS RECORDED N/A 7/15/2020    Procedure: CV RIGHT HEART CATH;  Surgeon: Marcelo Ramírez MD;  [facility]    CV RIGHT HEART CATH MEASUREMENTS RECORDED N/A 6/7/2021    Procedure: CV RIGHT HEART CATH;  Surgeon: Gilberto Mccann MD;  Location: [facility]  CARDIAC CATH LAB    CV RIGHT HEART CATH MEASUREMENTS RECORDED N/A 6/13/2022    Procedure: Right Heart Catheterization;  Surgeon: Marcelo Ramírez MD;  [facility]    ENDOSCOPIC ULTRASOUND UPPER GASTROINTESTINAL TRACT (GI) N/A 11/8/2021    Procedure: ENDOSCOPIC ULTRASOUND, ESOPHAGOSCOPY / UPPER GASTROINTESTINAL TRACT (GI)  EUS with FNA;  Surgeon: Alon Don MD;  Location: [facility]    ESOPHAGOSCOPY, GASTROSCOPY, DUODENOSCOPY (EGD), COMBINED N/A 5/7/2018    Procedure: COMBINED ENDOSCOPIC ULTRASOUND, ESOPHAGOSCOPY, GASTROSCOPY, DUODENOSCOPY (EGD), FINE NEEDLE ASPIRATE/BIOPSY;  Endoscopic Ultrasound with Fine Needle Aspiration ;  Surgeon: Alon Don MD;  [facility]    EXAM UNDER ANESTHESIA RECTUM N/A 8/12/2018    Procedure: EXAM UNDER ANESTHESIA RECTUM;  EXAM UNDER ANESTHESIA RECTUM ,COMBINED INCISION AND DRAINAGE OF RECTAL ABCESS ;  Surgeon: Rick Tran MD;  [facility]    HERNIORRHAPHY VENTRAL N/A 6/24/2022    Procedure: open mesh repair, incisional ventral hernia;  Surgeon: Shaheed Curtis MD;  [facility]    INCISION AND DRAINAGE RECTUM, COMBINED N/A 8/12/2018    Procedure: COMBINED INCISION AND DRAINAGE RECTUM;;  Surgeon: Rick Tran MD;  [facility]    IR DIALYSIS FISTULOGRAM LEFT  9/25/2019    IR DIALYSIS FISTULOGRAM LEFT  11/22/2019    IR DIALYSIS PTA  9/25/2019    IR DIALYSIS PTA  11/22/2019    LAPAROSCOPIC ASSISTED COLOSTOMY TAKEDOWN N/A 12/11/2018    Procedure: Laparoscopic Assisted Colostomy Takedown, Laparoscopic Lysis of Adhesions;  Surgeon: Rick Tran MD;  [facility]    LAPAROSCOPIC ASSISTED SIGMOID COLECTOMY N/A 8/14/2018    Procedure: LAPAROSCOPIC ASSISTED SIGMOID COLECTOMY;  Laparoscopic Hand Assisted Takedown of Splenic Flexure, Sigmoidectomy, Small Bowel Resection, Takedown of Small Bowel to Colon Fistula;  Surgeon: Rick Tran MD;  [facility]    LAPAROSCOPIC  HERNIORRHAPHY INGUINAL BILATERAL Bilateral 7/24/2015    Procedure: LAPAROSCOPIC HERNIORRHAPHY INGUINAL BILATERAL;  Surgeon: Bobby Mcconnell MD;  [facility]    LAPAROSCOPIC INSERTION CATHETER PERITONEAL DIALYSIS N/A 6/22/2017    Procedure: LAPAROSCOPIC INSERTION CATHETER PERITONEAL DIALYSIS;  Laparoscopic Peritoneal Dialysis Catheter Placement - Anesthesia with block;  Surgeon: Esteban Arvizu MD;  [facility]    PICC INSERTION Left 04/22/2018    5Fr - 49cm (3cm external), Basilic vein, low SVC    REMOVE CATHETER PERITONEAL Right 1/15/2018    Procedure: REMOVE CATHETER PERITONEAL;  Open Removal of Peritoneal Dialysis Catheter ;  Surgeon: Esteban Arvizu MD;  [facility]    SIGMOIDOSCOPY FLEXIBLE N/A 11/21/2018    Procedure: Examination Under Anesthesia, Flexible Sigmoidoscopy and Polypectomy;  Surgeon: Rick Tran MD;  [facility]    SIGMOIDOSCOPY FLEXIBLE N/A 12/11/2018    Procedure: Flexible Sigmoidoscopy;  Surgeon: Rick Tran MD;  [facility]    TAKEDOWN ILEOSTOMY N/A 3/27/2019    Procedure: Takedown Ileostomy;  Surgeon: Rick Tran MD;  [facility]    TRANSPLANT HEART RECIPIENT N/A 6/14/2018    Procedure: TRANSPLANT HEART RECIPIENT;  Median Sternotomy, on-pump oxygenator, Heart Transplant;  Surgeon: Rony Caputo MD;  [facility]    TRANSPLANT KIDNEY RECIPIENT LIVING UNRELATED  12/2019       Physical Exam    No exam today.    RESULTS  Creatinine   Date Value Ref Range Status   07/20/2023 1.10 0.67 - 1.17 mg/dL Final   07/06/2021 1.09 0.66 - 1.25 mg/dL Final     GFR Estimate   Date Value Ref Range Status   07/20/2023 73 >60 mL/min/1.73m2 Final   07/06/2021 70 >60 mL/min/[1.73_m2] Final     Comment:     Non  GFR Calc  Starting 12/18/2018, serum creatinine based estimated GFR (eGFR) will be   calculated using the Chronic Kidney Disease Epidemiology Collaboration   (CKD-EPI) equation.       Hemoglobin A1C   Date  Value Ref Range Status   06/19/2023 7.3 (H) <5.7 % Final     Comment:     Normal <5.7%   Prediabetes 5.7-6.4%    Diabetes 6.5% or higher     Note: Adopted from ADA consensus guidelines.   06/07/2021 6.2 (H) 0 - 5.6 % Final     Comment:     Normal <5.7% Prediabetes 5.7-6.4%  Diabetes 6.5% or higher - adopted from ADA   consensus guidelines.       Potassium   Date Value Ref Range Status   07/20/2023 4.3 3.4 - 5.3 mmol/L Final   08/09/2022 3.7 3.4 - 5.3 mmol/L Final   07/06/2021 3.9 3.4 - 5.3 mmol/L Final     ALT   Date Value Ref Range Status   06/19/2023 21 0 - 70 U/L Final     Comment:     Reference intervals for this test were updated on 6/12/2023 to more accurately reflect our healthy population. There may be differences in the flagging of prior results with similar values performed with this method. Interpretation of those prior results can be made in the context of the updated reference intervals.     06/07/2021 28 0 - 70 U/L Final     AST   Date Value Ref Range Status   06/19/2023 30 0 - 45 U/L Final     Comment:     Specimen is hemolyzed which can falsely elevate AST. Analysis of a non-hemolyzed specimen may result in a lower value.    Reference intervals for this test were updated on 6/12/2023 to more accurately reflect our healthy population. There may be differences in the flagging of prior results with similar values performed with this method. Interpretation of those prior results can be made in the context of the updated reference intervals.   06/07/2021 27 0 - 45 U/L Final     TSH   Date Value Ref Range Status   09/07/2021 1.89 0.40 - 4.00 mU/L Final   04/16/2018 2.25 0.40 - 4.00 mU/L Final       Cholesterol   Date Value Ref Range Status   06/19/2023 152 <200 mg/dL Final   01/31/2023 130 <200 mg/dL Final   06/07/2021 120 <200 mg/dL Final   12/09/2020 116 <200 mg/dL Final     HDL Cholesterol   Date Value Ref Range Status   06/07/2021 49 >39 mg/dL Final   12/09/2020 47 >39 mg/dL Final     Direct Measure  HDL   Date Value Ref Range Status   06/19/2023 47 >=40 mg/dL Final   01/31/2023 56 >=40 mg/dL Final     LDL Cholesterol Calculated   Date Value Ref Range Status   06/19/2023 64 <=100 mg/dL Final   01/31/2023 48 <=100 mg/dL Final   06/07/2021 28 <100 mg/dL Final     Comment:     Desirable:       <100 mg/dl   12/09/2020 26 <100 mg/dL Final     Comment:     Desirable:       <100 mg/dl     Triglycerides   Date Value Ref Range Status   06/19/2023 205 (H) <150 mg/dL Final   01/31/2023 129 <150 mg/dL Final   06/07/2021 215 (H) <150 mg/dL Final     Comment:     Borderline high:  150-199 mg/dl  High:             200-499 mg/dl  Very high:       >499 mg/dl     12/09/2020 214 (H) <150 mg/dL Final     Comment:     Borderline high:  150-199 mg/dl  High:             200-499 mg/dl  Very high:       >499 mg/dl       Cholesterol/HDL Ratio   Date Value Ref Range Status   08/10/2015 5.0 0.0 - 5.0 Final   04/09/2015 4.0 0.0 - 5.0 Final         ASSESSMENT/PLAN:    1.  TYPE 2 DIABETES MELLITUS: Patient's A1C is higher at 7.3 % on 6/19/2023.  Discussed adding Jardiance today. I reviewed how Jardiance works and possible side effects of the drug including dehydration, UTI and groin yeast infection.  Also reviewed the cardiovascular and renal benefits of using a SGLT-2 drug.  Will start Jardiance 10 mg each am.  Recheck creat/GFR in 1 month.  Reminded Murray to drink plenty of water.  Continue current dose of Metformin, healthy eating, portion control and exercise.  I asked pt to check his FBS each am and 2 hr postmeal blood sugar.  Pt seen by Oph in June 2022 without retinopathy per patient. He will see Oph in 9/2023.  No sx of neuropathy and he denies foot ulcers.  No vitals today.    2.  HX OF ESRD: S/P kidney transplant in Dec 2019.  Most recent creat 1.10 with GFR 73 mL/min in July 2023.    3.  CARDIAC: S/P heart transplant in 6/2018.    4.  LIPIDS: LDL 64 in 6/2023. Pt taking Lipitor.    5.  FOLLOW UP: With me in 3 months.  Creat/GFR  ordered and to be done in 1 month.    Time spent reviewing chart, labs and glucose data today = 6 minutes.  Time for video visit today =  17 minutes.  Time for documentation today = 15 minutes.    Total time for visit today = 38 minutes.    Rosa Calvert PA-C    Answers submitted by the patient for this visit:  Symptoms you have experienced in the last 30 days (Submitted on 8/9/2023)  General Symptoms: No  Skin Symptoms: No  HENT Symptoms: No  EYE SYMPTOMS: No  HEART SYMPTOMS: No  LUNG SYMPTOMS: No  INTESTINAL SYMPTOMS: No  URINARY SYMPTOMS: No  REPRODUCTIVE SYMPTOMS: No  SKELETAL SYMPTOMS: No  BLOOD SYMPTOMS: No  NERVOUS SYSTEM SYMPTOMS: No  MENTAL HEALTH SYMPTOMS: No

## 2023-08-11 NOTE — PROGRESS NOTES
Virtual Visit Details    Type of service:  Video Visit   Video Start Time:   Video End Time:    Originating Location (pt. Location):     Distant Location (provider location):    Platform used for Video Visit:   Answers submitted by the patient for this visit:  Symptoms you have experienced in the last 30 days (Submitted on 8/9/2023)  General Symptoms: No  Skin Symptoms: No  HENT Symptoms: No  EYE SYMPTOMS: No  HEART SYMPTOMS: No  LUNG SYMPTOMS: No  INTESTINAL SYMPTOMS: No  URINARY SYMPTOMS: No  REPRODUCTIVE SYMPTOMS: No  SKELETAL SYMPTOMS: No  BLOOD SYMPTOMS: No  NERVOUS SYSTEM SYMPTOMS: No  MENTAL HEALTH SYMPTOMS: No

## 2023-08-11 NOTE — NURSING NOTE
Is the patient currently in the state of [place]? YES    Visit mode:VIDEO    If the visit is dropped, the patient can be reconnected by: TELEPHONE VISIT: Phone number: [phone]    Will anyone else be joining the visit? NO      How would you like to obtain your AVS? MyChart    Are changes needed to the allergy or medication list? NO    Reason for visit: Video Visit (3 month follow-up )

## 2023-08-11 NOTE — PROGRESS NOTES
Time of start: 10:32 am  Time of end: 10:49 am  Total duration of video visit: 17 minutes  Providers location: offsite.  Patients location: [place].    HPI  Murray Nicholson is a 68 year old male with type 2 diabetes mellitus. Video visit for diabetes follow up today.  Pt last seen by me in 2/2023 and seen by Dr. Merida in Aug 2021.  Pt gives a hx of type 2 diabetes > 20 years complicated by ESRD s/p kidney transplant in Dec 2019.   Pt denies known hx of retinopathy or sx of neuropathy.  He has hx CAD s/p heart transplant in June 2018.  Pt also has hx of HTN, hyperlipidemia, ascending aortic aneurysm and GERD.  For his diabetes, he is currently taking Metformin 500 mg 4 tabs in am.  Most recent A1C was higher at 7.3 % on 6/19/2023.   Pt's previous A1C ws 6.9 % on 1/31/2023.  He provided me with a few blood sugar readings today which I scanned in his note below.  Most of these blood sugar values are fasting.  On ROS today, he is eating healthy and smaller portions and less sweets.  He remains activity.  Denies blurred vision, n/v, SOB at rest, cough, fever, chest pain or abd pain.  No diarrhea, dysuria or hematuria.  He denies sx of neuropathy or foot ulcers.    Diabetes Care  Retinopathy: none per patient; pt seen by Oph in June 2022.He plans to see Oph in Sept 2023.  Nephropathy:hx of ESRD s/p kidney transplant in 12/2019. Urine microalbuminuria negative in 7/2023.  Neuropathy: none per patient.  Foot Exam: no exam today.  Taking aspirin: yes.  Lipids: LDL 64 in 6/2023. Pt taking Lipitor.  Insulin: none.  DM meds: Metformin.  ADDING low dose Jardiance today.  Testing: glucose meter.          ROS  See under HPI.      Allergies  Allergies   Allergen Reactions    Cats Shortness Of Breath and Anaphylaxis    Penicillins Hives    Shrimp Shortness Of Breath and Anaphylaxis    Isosorbide Nitrate      hypotension    Norco [Hydrocodone-Acetaminophen] Nausea and Vomiting       Medications  Current Outpatient Medications   Medication  Sig Dispense Refill    alcohol swab prep pads Use to swab area of injection/davida as directed. 300 each 6    amLODIPine (NORVASC) 5 MG tablet Take 1 tablet (5 mg) by mouth daily 90 tablet 3    aspirin 81 MG EC tablet Take 81 mg by mouth every evening      atorvastatin (LIPITOR) 40 MG tablet Take 1 tablet (40 mg) by mouth every evening 90 day supply if able 90 tablet 3    biotin 1000 MCG TABS tablet Take 1,000 mcg by mouth every morning Patient takes every other day      blood glucose (ACCU-CHEK GUIDE) test strip Use to test blood sugar 3 times daily or as directed. 300 strip 1    blood glucose (ACCU-CHEK SOFTCLIX) lancing device Lancing device to be used with lancets. Test blood sugar 3 times daily or as directed 1 each 0    blood glucose (NO BRAND SPECIFIED) test strip Use to test blood sugar 3 times daily or as directed. Any covered brand that works with meter. 300 strip 1    blood glucose monitoring (ACCU-CHEK FASTCLIX) lancets USE TO TEST 3 TIMES DAILY OR AS DIRECTED. 300 each 1    blood glucose monitoring (NO BRAND SPECIFIED) meter device kit Use to test blood sugar 3 times daily or as directed.  Any covered brand. 1 kit 0    blood glucose monitoring (SOFTCLIX) lancets Use to test blood sugar 3 times daily. 300 each 6    calcium citrate and vitamin D (CITRACAL) 200-250 MG-UNIT TABS per tablet Take 1 tablet by mouth 2 times daily      Calcium Polycarbophil (FIBER) 625 MG tablet Take by mouth every morning      co-enzyme Q-10 30 MG CAPS capsule Take by mouth every morning      loratadine (CLARITIN) 10 MG tablet Take 10 mg by mouth every evening Patient takes at bedtime      magnesium 250 MG tablet Take 1 tablet by mouth every morning      metFORMIN (GLUCOPHAGE XR) 500 MG 24 hr tablet Take 4 tabs every  tablet 3    Misc Natural Products (TART CHERRY ADVANCED PO) Take by mouth every morning      Multiple Vitamins-Minerals (HAIR SKIN NAILS PO) Take by mouth every morning      mycophenolate (GENERIC EQUIVALENT)  250 MG capsule Take 2 capsules (500 mg) by mouth 2 times daily 360 capsule 3    OMEPRAZOLE PO Take 20 mg by mouth every morning      sulfamethoxazole-trimethoprim (BACTRIM) 400-80 MG tablet Take 1 tablet by mouth every morning 90 tablet 3    tacrolimus (GENERIC EQUIVALENT) 0.5 MG capsule Take 1 capsule (0.5 mg) by mouth 2 times daily 180 capsule 3    tacrolimus (GENERIC EQUIVALENT) 1 MG capsule ON HOLD due to dose change 90 capsule 3    thin (NO BRAND SPECIFIED) lancets Use with lanceting device 3x daily. Any covered brand that works with lancing device. 300 each 1       Family History  family history includes C.A.D. in his father; Cerebrovascular Disease in his father; Diabetes in his father; Hypertension in his father.    Social History   reports that he quit smoking about 29 years ago. His smoking use included cigarettes. He started smoking about 39 years ago. He has a 20.00 pack-year smoking history. He has never used smokeless tobacco. He reports current alcohol use. He reports that he does not use drugs.     Past Medical History  Past Medical History:   Diagnosis Date    (HFpEF) heart failure with preserved ejection fraction (H)     Allergic rhinitis, cause unspecified     Anemia of chronic kidney failure     Aortic stenosis 08/13/2008    Overview:  Bicuspid aortic valve    AS (aortic stenosis)     Ascending aortic aneurysm (H)     Bicuspid aortic valve     Bilateral hand pain 06/01/2021    CAD (coronary artery disease)     Congestive heart failure, unspecified     Coronary artery disease involving native artery of transplanted heart without angina pectoris 07/10/2014    Dialysis patient (H)     Tues-Thur-Sat    Dyslipidemia     Esophageal reflux     ESRD (end stage renal disease) (H)     Hearing problem     Heart replaced by transplant (H) 12/10/2018    Hypersomnia with sleep apnea, unspecified     Hypertension     Ileostomy status (H)     Immunosuppression (H)     MGUS (monoclonal gammopathy of unknown  significance)     Mitral regurgitation     SHEELA (obstructive sleep apnea)     No CPAP    Pneumonia     Sigmoid diverticulitis 08/14/2018    Status post coronary angiogram 06/28/2019    Systolic heart failure (H)     Type 2 diabetes mellitus (H)     Ventral hernia without obstruction or gangrene        Past Surgical History:   Procedure Laterality Date    BIOPSY      CARDIAC SURGERY      COLONOSCOPY N/A 5/3/2018    Procedure: COLONOSCOPY;  colonoscopy ;  Surgeon: Ammon Castillo MD;  [facility]    COLONOSCOPY N/A 5/26/2023    Procedure: COLONOSCOPY, WITH POLYPECTOMY via bx forceps;  Surgeon: Francisco Bland MD;  [facility]    CREATE FISTULA ARTERIOVENOUS UPPER EXTREMITY BOVINE Left 5/8/2019    Procedure: Left Upper Extremity Arteriovenous Bovine Graft Creation;  Surgeon: Calin Cheney MD;  [facility]    CV CORONARY ANGIOGRAM N/A 6/28/2019    Procedure: CV CORONARY ANGIOGRAM;  Surgeon: Montrell Posada MD;  [facility]    CV CORONARY ANGIOGRAM N/A 7/15/2020    Procedure: CV CORONARY ANGIOGRAM;  Surgeon: Marcelo Ramírez MD;  [facility]    CV CORONARY ANGIOGRAM N/A 6/7/2021    Procedure: CV CORONARY ANGIOGRAM;  Surgeon: Gilberto Mccann MD;  [facility]    CV CORONARY ANGIOGRAM N/A 6/13/2022    Procedure: Coronary Angiogram;  Surgeon: Marcelo Ramírez MD;  [facility]    CV HEART BIOPSY N/A 2/1/2019    Procedure: HBX;  Surgeon: Montrell Posada MD;  [facility]    CV HEART BIOPSY N/A 3/22/2019    Procedure: HBX, RIJV ACCESS;  Surgeon: Jordan Fox MD;  [facility]    CV HEART BIOPSY N/A 6/28/2019    Procedure: CV HEART BIOPSY;  Surgeon: Montrell Posada MD;  [facility]    CV HEART BIOPSY N/A 10/28/2019    Procedure: CV HEART BIOPSY;  Surgeon: Marcelo Ramírez MD;  [facility]    CV HEART BIOPSY N/A 2/6/2020    Procedure: CV HEART BIOPSY;  Surgeon: Montrell Posada MD;  [facility]    CV HEART BIOPSY N/A 7/15/2020    Procedure: CV HEART BIOPSY;  Surgeon: Marcelo Ramírez MD;  [facility]    CV RIGHT HEART CATH MEASUREMENTS RECORDED N/A 6/28/2019    Procedure: CV RIGHT HEART CATH;  Surgeon: Montrell Posada MD;  [facility]    CV RIGHT HEART CATH MEASUREMENTS RECORDED N/A 7/15/2020    Procedure: CV RIGHT HEART CATH;  Surgeon: Marcelo Ramírez MD;  [facility]    CV RIGHT HEART CATH MEASUREMENTS RECORDED N/A 6/7/2021    Procedure: CV RIGHT HEART CATH;  Surgeon: Gilberto Mccann MD;  Location:  [facility]ATH [facility]    CV RIGHT HEART CATH MEASUREMENTS RECORDED N/A 6/13/2022    Procedure: Right Heart Catheterization;  Surgeon: Marcelo Ramírez MD;  [facility]    ENDOSCOPIC ULTRASOUND UPPER GASTROINTESTINAL TRACT (GI) N/A 11/8/2021    Procedure: ENDOSCOPIC ULTRASOUND, ESOPHAGOSCOPY / UPPER GASTROINTESTINAL TRACT (GI)  EUS with FNA;  Surgeon: Alon Don MD;  Location[facility]    ESOPHAGOSCOPY, GASTROSCOPY, DUODENOSCOPY (EGD), COMBINED N/A 5/7/2018    Procedure: COMBINED ENDOSCOPIC ULTRASOUND, ESOPHAGOSCOPY, GASTROSCOPY, DUODENOSCOPY (EGD), FINE NEEDLE ASPIRATE/BIOPSY;  Endoscopic Ultrasound with Fine Needle Aspiration ;  Surgeon: Alon Don MD;  [facility]    EXAM UNDER ANESTHESIA RECTUM N/A 8/12/2018    Procedure: EXAM UNDER ANESTHESIA RECTUM;  EXAM UNDER ANESTHESIA RECTUM ,COMBINED INCISION AND DRAINAGE OF RECTAL ABCESS ;  Surgeon: Rick Tran MD;  [facility]    HERNIORRHAPHY VENTRAL N/A 6/24/2022    Procedure: open mesh repair, incisional ventral hernia;  Surgeon: Shaheed Curtis MD;  [facility]    INCISION AND DRAINAGE RECTUM, COMBINED N/A 8/12/2018    Procedure: COMBINED INCISION  AND DRAINAGE RECTUM;;  Surgeon: Rick Tran MD;  [facility]    IR DIALYSIS FISTULOGRAM LEFT  9/25/2019    IR DIALYSIS FISTULOGRAM LEFT  11/22/2019    IR DIALYSIS PTA  9/25/2019    IR DIALYSIS PTA  11/22/2019    LAPAROSCOPIC ASSISTED COLOSTOMY TAKEDOWN N/A 12/11/2018    Procedure: Laparoscopic Assisted Colostomy Takedown, Laparoscopic Lysis of Adhesions;  Surgeon: Rick Tran MD;  [facility]    LAPAROSCOPIC ASSISTED SIGMOID COLECTOMY N/A 8/14/2018    Procedure: LAPAROSCOPIC ASSISTED SIGMOID COLECTOMY;  Laparoscopic Hand Assisted Takedown of Splenic Flexure, Sigmoidectomy, Small Bowel Resection, Takedown of Small Bowel to Colon Fistula;  Surgeon: Rick Tran MD;  [facility]    LAPAROSCOPIC HERNIORRHAPHY INGUINAL BILATERAL Bilateral 7/24/2015    Procedure: LAPAROSCOPIC HERNIORRHAPHY INGUINAL BILATERAL;  Surgeon: Bobby Mcconnell MD;  [facility]    LAPAROSCOPIC INSERTION CATHETER PERITONEAL DIALYSIS N/A 6/22/2017    Procedure: LAPAROSCOPIC INSERTION CATHETER PERITONEAL DIALYSIS;  Laparoscopic Peritoneal Dialysis Catheter Placement - Anesthesia with block;  Surgeon: Esteban Arvizu MD;  [facility]    PICC INSERTION Left 04/22/2018    5Fr - 49cm (3cm external), Basilic vein, low SVC    REMOVE CATHETER PERITONEAL Right 1/15/2018    Procedure: REMOVE CATHETER PERITONEAL;  Open Removal of Peritoneal Dialysis Catheter ;  Surgeon: Esteban Arvizu MD;  [facility]    SIGMOIDOSCOPY FLEXIBLE N/A 11/21/2018    Procedure: Examination Under Anesthesia, Flexible Sigmoidoscopy and Polypectomy;  Surgeon: Rick Tran MD;  [facility]    SIGMOIDOSCOPY FLEXIBLE N/A 12/11/2018    Procedure: Flexible Sigmoidoscopy;  Surgeon: Rick Tran MD;  [facility]    TAKEDOWN ILEOSTOMY N/A 3/27/2019    Procedure: Takedown Ileostomy;  Surgeon: Rick Tran MD;  [facility]    TRANSPLANT HEART RECIPIENT N/A 6/14/2018     Procedure: TRANSPLANT HEART RECIPIENT;  Median Sternotomy, on-pump oxygenator, Heart Transplant;  Surgeon: Rony Caputo MD;  [facility]    TRANSPLANT KIDNEY RECIPIENT LIVING UNRELATED  12/2019       Physical Exam    No exam today.    RESULTS  Creatinine   Date Value Ref Range Status   07/20/2023 1.10 0.67 - 1.17 mg/dL Final   07/06/2021 1.09 0.66 - 1.25 mg/dL Final     GFR Estimate   Date Value Ref Range Status   07/20/2023 73 >60 mL/min/1.73m2 Final   07/06/2021 70 >60 mL/min/[1.73_m2] Final     Comment:     Non  GFR Calc  Starting 12/18/2018, serum creatinine based estimated GFR (eGFR) will be   calculated using the Chronic Kidney Disease Epidemiology Collaboration   (CKD-EPI) equation.       Hemoglobin A1C   Date Value Ref Range Status   06/19/2023 7.3 (H) <5.7 % Final     Comment:     Normal <5.7%   Prediabetes 5.7-6.4%    Diabetes 6.5% or higher     Note: Adopted from ADA consensus guidelines.   06/07/2021 6.2 (H) 0 - 5.6 % Final     Comment:     Normal <5.7% Prediabetes 5.7-6.4%  Diabetes 6.5% or higher - adopted from ADA   consensus guidelines.       Potassium   Date Value Ref Range Status   07/20/2023 4.3 3.4 - 5.3 mmol/L Final   08/09/2022 3.7 3.4 - 5.3 mmol/L Final   07/06/2021 3.9 3.4 - 5.3 mmol/L Final     ALT   Date Value Ref Range Status   06/19/2023 21 0 - 70 U/L Final     Comment:     Reference intervals for this test were updated on 6/12/2023 to more accurately reflect our healthy population. There may be differences in the flagging of prior results with similar values performed with this method. Interpretation of those prior results can be made in the context of the updated reference intervals.     06/07/2021 28 0 - 70 U/L Final     AST   Date Value Ref Range Status   06/19/2023 30 0 - 45 U/L Final     Comment:     Specimen is hemolyzed which can falsely elevate AST. Analysis of a non-hemolyzed specimen may result in a lower value.    Reference intervals for this  test were updated on 6/12/2023 to more accurately reflect our healthy population. There may be differences in the flagging of prior results with similar values performed with this method. Interpretation of those prior results can be made in the context of the updated reference intervals.   06/07/2021 27 0 - 45 U/L Final     TSH   Date Value Ref Range Status   09/07/2021 1.89 0.40 - 4.00 mU/L Final   04/16/2018 2.25 0.40 - 4.00 mU/L Final       Cholesterol   Date Value Ref Range Status   06/19/2023 152 <200 mg/dL Final   01/31/2023 130 <200 mg/dL Final   06/07/2021 120 <200 mg/dL Final   12/09/2020 116 <200 mg/dL Final     HDL Cholesterol   Date Value Ref Range Status   06/07/2021 49 >39 mg/dL Final   12/09/2020 47 >39 mg/dL Final     Direct Measure HDL   Date Value Ref Range Status   06/19/2023 47 >=40 mg/dL Final   01/31/2023 56 >=40 mg/dL Final     LDL Cholesterol Calculated   Date Value Ref Range Status   06/19/2023 64 <=100 mg/dL Final   01/31/2023 48 <=100 mg/dL Final   06/07/2021 28 <100 mg/dL Final     Comment:     Desirable:       <100 mg/dl   12/09/2020 26 <100 mg/dL Final     Comment:     Desirable:       <100 mg/dl     Triglycerides   Date Value Ref Range Status   06/19/2023 205 (H) <150 mg/dL Final   01/31/2023 129 <150 mg/dL Final   06/07/2021 215 (H) <150 mg/dL Final     Comment:     Borderline high:  150-199 mg/dl  High:             200-499 mg/dl  Very high:       >499 mg/dl     12/09/2020 214 (H) <150 mg/dL Final     Comment:     Borderline high:  150-199 mg/dl  High:             200-499 mg/dl  Very high:       >499 mg/dl       Cholesterol/HDL Ratio   Date Value Ref Range Status   08/10/2015 5.0 0.0 - 5.0 Final   04/09/2015 4.0 0.0 - 5.0 Final         ASSESSMENT/PLAN:    1.  TYPE 2 DIABETES MELLITUS: Patient's A1C is higher at 7.3 % on 6/19/2023.  Discussed adding Jardiance today. I reviewed how Jardiance works and possible side effects of the drug including dehydration, UTI and groin yeast  infection.  Also reviewed the cardiovascular and renal benefits of using a SGLT-2 drug.  Will start Jardiance 10 mg each am.  Recheck creat/GFR in 1 month.  Reminded Murray to drink plenty of water.  Continue current dose of Metformin, healthy eating, portion control and exercise.  I asked pt to check his FBS each am and 2 hr postmeal blood sugar.  Pt seen by Oph in June 2022 without retinopathy per patient. He will see Oph in 9/2023.  No sx of neuropathy and he denies foot ulcers.  No vitals today.    2.  HX OF ESRD: S/P kidney transplant in Dec 2019.  Most recent creat 1.10 with GFR 73 mL/min in July 2023.    3.  CARDIAC: S/P heart transplant in 6/2018.    4.  LIPIDS: LDL 64 in 6/2023. Pt taking Lipitor.    5.  FOLLOW UP: With me in 3 months.  Creat/GFR ordered and to be done in 1 month.    Time spent reviewing chart, labs and glucose data today = 6 minutes.  Time for video visit today =  17 minutes.  Time for documentation today = 15 minutes.    Total time for visit today = 38 minutes.    Rosa Calvert PA-C    Answers submitted by the patient for this visit:  Symptoms you have experienced in the last 30 days (Submitted on 8/9/2023)  General Symptoms: No  Skin Symptoms: No  HENT Symptoms: No  EYE SYMPTOMS: No  HEART SYMPTOMS: No  LUNG SYMPTOMS: No  INTESTINAL SYMPTOMS: No  URINARY SYMPTOMS: No  REPRODUCTIVE SYMPTOMS: No  SKELETAL SYMPTOMS: No  BLOOD SYMPTOMS: No  NERVOUS SYSTEM SYMPTOMS: No  MENTAL HEALTH SYMPTOMS: No

## 2023-08-17 ENCOUNTER — ONCOLOGY VISIT (OUTPATIENT)
Dept: PULMONOLOGY | Facility: CLINIC | Age: 68
End: 2023-08-17
Attending: INTERNAL MEDICINE
Payer: MEDICARE

## 2023-08-17 ENCOUNTER — MYC MEDICAL ADVICE (OUTPATIENT)
Dept: TRANSPLANT | Facility: CLINIC | Age: 68
End: 2023-08-17

## 2023-08-17 ENCOUNTER — PRE VISIT (OUTPATIENT)
Dept: PULMONOLOGY | Facility: CLINIC | Age: 68
End: 2023-08-17
Payer: MEDICARE

## 2023-08-17 VITALS
OXYGEN SATURATION: 100 % | TEMPERATURE: 98.2 F | DIASTOLIC BLOOD PRESSURE: 83 MMHG | RESPIRATION RATE: 16 BRPM | HEIGHT: 68 IN | BODY MASS INDEX: 28.21 KG/M2 | SYSTOLIC BLOOD PRESSURE: 120 MMHG | HEART RATE: 85 BPM

## 2023-08-17 DIAGNOSIS — R91.1 LUNG NODULE: ICD-10-CM

## 2023-08-17 DIAGNOSIS — Z94.1 HEART REPLACED BY TRANSPLANT (H): ICD-10-CM

## 2023-08-17 PROCEDURE — 99204 OFFICE O/P NEW MOD 45 MIN: CPT | Performed by: STUDENT IN AN ORGANIZED HEALTH CARE EDUCATION/TRAINING PROGRAM

## 2023-08-17 PROCEDURE — G0463 HOSPITAL OUTPT CLINIC VISIT: HCPCS | Performed by: STUDENT IN AN ORGANIZED HEALTH CARE EDUCATION/TRAINING PROGRAM

## 2023-08-17 ASSESSMENT — PAIN SCALES - GENERAL: PAINLEVEL: NO PAIN (0)

## 2023-08-17 NOTE — NURSING NOTE
"Oncology Rooming Note    August 17, 2023 9:37 AM   Murray Nicholson is a 68 year old male who presents for:    Chief Complaint   Patient presents with    Oncology Clinic Visit     Heart replaced by transplant; lung nodule     Initial Vitals: /83 (BP Location: Right arm, Patient Position: Sitting, Cuff Size: Adult Regular)   Pulse 85   Temp 98.2  F (36.8  C) (Oral)   Resp 16   Ht 1.72 m (5' 7.72\")   SpO2 100%   BMI 28.21 kg/m   Estimated body mass index is 28.21 kg/m  as calculated from the following:    Height as of this encounter: 1.72 m (5' 7.72\").    Weight as of 6/19/23: 83.5 kg (184 lb). Body surface area is 2 meters squared.  No Pain (0) Comment: Data Unavailable   No LMP for male patient.  Allergies reviewed: Yes  Medications reviewed: Yes    Medications: Medication refills not needed today.  Pharmacy name entered into EPIC:    [id number] - [place], [place] - [address] [place] [address] AT [facility]    Clinical concerns:       Fantasma Granado              "

## 2023-08-17 NOTE — PROGRESS NOTES
LUNG NODULE & INTERVENTIONAL PULMONARY CLINIC  [facility]    Murray Nicholson MRN# [id number]   Age: 68 year old YOB: 1955     Reason for Consultation: Lung nodule    Requesting Physician: Klever Ernst MD  [address]  [place] [address]     Assessment and Plan:    Murray Nicholson is a 68 year old male who presents for evaluation of lung nodules.    I reviewed their CT scan which reveals multiple GGO in the upper lobes bilaterally. There is one new one in the RUL measuring 12.9mm which is new when compared with 1/2018 CT. No solid components to these nodules. No infectious type symptoms.     Recommend 6 month CT with follow up.     Patient indicated understanding and agreed to the plan of care. All questions answered.     Alejandro Murguia DO  Interventional Pulmonology  Department of Pulmonary, Allergy, Critical Care and Sleep Medicine   [facility]     History:    Murray Nicholson is a 68 year old male who presents for evaluation of lung nodules in the setting of heart and kidney transplant.   No cough or sob.   Quit smoking cigarettes 30 years ago. 1ppd x 20 years.   No prior history of cancer.   No family history of cancer.   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   No known exposure to radon or uranium.   No history of COPD or asthma  No exposure to fugnus or TB  Immunosuppressed with mycophenolate.   No weight loss   No night sweats.   No risk of aspiration     Medications:  Current Outpatient Medications   Medication Sig    alcohol swab prep pads Use to swab area of injection/davida as directed.    amLODIPine (NORVASC) 5 MG tablet Take 1 tablet (5 mg) by mouth daily    aspirin 81 MG EC tablet Take 81 mg by mouth every evening    atorvastatin (LIPITOR) 40 MG tablet Take 1 tablet (40 mg) by mouth every evening 90 day supply if able    biotin 1000 MCG TABS tablet Take 1,000 mcg by  mouth every morning Patient takes every other day    blood glucose (ACCU-CHEK GUIDE) test strip Use to test blood sugar 3 times daily or as directed.    blood glucose (ACCU-CHEK SOFTCLIX) lancing device Lancing device to be used with lancets. Test blood sugar 3 times daily or as directed    blood glucose (NO BRAND SPECIFIED) test strip Use to test blood sugar 3 times daily or as directed. Any covered brand that works with meter.    blood glucose monitoring (ACCU-CHEK FASTCLIX) lancets USE TO TEST 3 TIMES DAILY OR AS DIRECTED.    blood glucose monitoring (NO BRAND SPECIFIED) meter device kit Use to test blood sugar 3 times daily or as directed.  Any covered brand.    blood glucose monitoring (SOFTCLIX) lancets Use to test blood sugar 3 times daily.    calcium citrate and vitamin D (CITRACAL) 200-250 MG-UNIT TABS per tablet Take 1 tablet by mouth 2 times daily    Calcium Polycarbophil (FIBER) 625 MG tablet Take by mouth every morning    empagliflozin (JARDIANCE) 10 MG TABS tablet Take 1 tablet (10 mg) by mouth daily    mycophenolate (GENERIC EQUIVALENT) 250 MG capsule Take 2 capsules (500 mg) by mouth 2 times daily    tacrolimus (GENERIC EQUIVALENT) 0.5 MG capsule Take 1 capsule (0.5 mg) by mouth 2 times daily    co-enzyme Q-10 30 MG CAPS capsule Take by mouth every morning    loratadine (CLARITIN) 10 MG tablet Take 10 mg by mouth every evening Patient takes at bedtime    magnesium 250 MG tablet Take 1 tablet by mouth every morning    metFORMIN (GLUCOPHAGE XR) 500 MG 24 hr tablet Take 4 tabs every AM    Multiple Vitamins-Minerals (HAIR SKIN NAILS PO) Take by mouth every morning    OMEPRAZOLE PO Take 20 mg by mouth every morning    sulfamethoxazole-trimethoprim (BACTRIM) 400-80 MG tablet Take 1 tablet by mouth every morning    tacrolimus (GENERIC EQUIVALENT) 1 MG capsule ON HOLD due to dose change    thin (NO BRAND SPECIFIED) lancets Use with lanceting device 3x daily. Any covered brand that works with lancing device.  "    Current Facility-Administered Medications   Medication    cilgavimab 300 mg/tixagevimab 300 mg (EVUSHELD) - FULL DOSE     Facility-Administered Medications Ordered in Other Visits   Medication    diatrizoate meglumine-sodium (GASTROGRAFIN/GASTROVIEW) 66-10 % solution 480 mL         Physical exam:  /83 (BP Location: Right arm, Patient Position: Sitting, Cuff Size: Adult Regular)   Pulse 85   Temp 98.2  F (36.8  C) (Oral)   Resp 16   Ht 1.72 m (5' 7.72\")   SpO2 100%   BMI 28.21 kg/m    Wt Readings from Last 4 Encounters:   06/19/23 83.5 kg (184 lb)   04/21/23 80.9 kg (178 lb 6 oz)   03/06/23 82.1 kg (181 lb)   12/21/22 80.3 kg (177 lb)     General: Well appearing, nonlabored breathing  Neuro: Answering questions appropriately  Psych: Normal affect  Skin: Warm and dry         "

## 2023-08-22 NOTE — NURSING NOTE
Transplant Coordinator Note  Reason for visit: Follow up testing - 1yr 4 month follow up post heart transplant    Health concerns addressed today:  Pt seen in clinic Dr Ernst. MD reviewed VS, meds and labs. -150. Meds adjusted. Mag 1.2; received supplement in cath lab; requested to discuss with dialysis team. WBC 2.2; CMV and diff added. MMF decreased.    MD assessed HSV on buttocks, enc to follow up with derm.  Overall active; dialysis T,TH, Sat. Active on kidney wait list.    Due to Allomaps >34 - plan for labs, biopsy and clinic for one year 8 month visit.      Immunosuppressants:   mg BID -> decreased to 500 mg BID due to WBC 2.2  FK 3/3 mg - goal 6-8; level 11.8; dose decreased to 3/2 mg    No DSAs  Immuknow 258 (mod)  Allomaps 34, 27, 38, 37     Routine screenings:    Derm: Oct 2019 - return to reassess HSV  Dental: Ongoing   Colonoscopy: 2018  Prostate: PSA 1.4  Eye: Ongoing   Flu/Pneumonia: 2019 flu shot done.     Labs: Reviewed with pt; copy provided      Medication record reviewed and reconciled. Questions and concerns addressed. AVS reviewed and copy provided.  Pt verbalized an understanding of plan of care.      Patient Instructions  ~Please call your coordinator at [phone] with any questions or concerns.    ~Coordinator will call with biopsy results   ~Mag low - please have dialysis team adjust  ~Stop Clonidine; increase Lisinopril to 15 mg twice daily  ~Decrease Cellcept from 750 mg twice daily to 500 mg twice daily  ~Follow up with derm re skin infection  ~Decrease tacrolimus dose from 3 mg twice daily to 3 mg in the AM and 2 mg in the PM    ~Have CMV drawn today!   ~Labs at clinic on November 4 at 8 AM, non fasting, 12-hour tacrolimus level    ~Return to clinic on November 13 at 1030 to see NP Ramya Suh        Dressing: dry sterile dressing

## 2023-08-24 ENCOUNTER — MYC MEDICAL ADVICE (OUTPATIENT)
Dept: CARDIOLOGY | Facility: CLINIC | Age: 68
End: 2023-08-24
Payer: MEDICARE

## 2023-08-24 DIAGNOSIS — Z94.1 HEART REPLACED BY TRANSPLANT (H): ICD-10-CM

## 2023-08-24 DIAGNOSIS — I10 PRIMARY HYPERTENSION: ICD-10-CM

## 2023-08-25 DIAGNOSIS — Z94.0 KIDNEY REPLACED BY TRANSPLANT: ICD-10-CM

## 2023-08-25 RX ORDER — MYCOPHENOLATE MOFETIL 250 MG/1
500 CAPSULE ORAL 2 TIMES DAILY
Qty: 120 CAPSULE | Refills: 11 | Status: SHIPPED | OUTPATIENT
Start: 2023-08-25

## 2023-08-25 RX ORDER — AMLODIPINE BESYLATE 5 MG/1
5 TABLET ORAL DAILY
Qty: 90 TABLET | Refills: 3 | Status: SHIPPED | OUTPATIENT
Start: 2023-08-25 | End: 2024-08-15

## 2023-09-21 ENCOUNTER — TRANSFERRED RECORDS (OUTPATIENT)
Dept: MULTI SPECIALTY CLINIC | Facility: CLINIC | Age: 68
End: 2023-09-21

## 2023-09-21 LAB — RETINOPATHY: NORMAL

## 2023-09-26 ENCOUNTER — TELEPHONE (OUTPATIENT)
Dept: TRANSPLANT | Facility: CLINIC | Age: 68
End: 2023-09-26

## 2023-09-26 ENCOUNTER — LAB (OUTPATIENT)
Dept: LAB | Facility: CLINIC | Age: 68
End: 2023-09-26
Payer: MEDICARE

## 2023-09-26 DIAGNOSIS — E83.52 HYPERCALCEMIA: Primary | ICD-10-CM

## 2023-09-26 DIAGNOSIS — Z94.0 KIDNEY REPLACED BY TRANSPLANT: ICD-10-CM

## 2023-09-26 DIAGNOSIS — E11.22 TYPE 2 DIABETES MELLITUS WITH CHRONIC KIDNEY DISEASE, WITHOUT LONG-TERM CURRENT USE OF INSULIN, UNSPECIFIED CKD STAGE (H): ICD-10-CM

## 2023-09-26 LAB
ALBUMIN MFR UR ELPH: 13.5 MG/DL
ANION GAP SERPL CALCULATED.3IONS-SCNC: 11 MMOL/L (ref 7–15)
BUN SERPL-MCNC: 14.3 MG/DL (ref 8–23)
CALCIUM SERPL-MCNC: 11.5 MG/DL (ref 8.8–10.2)
CHLORIDE SERPL-SCNC: 102 MMOL/L (ref 98–107)
CREAT SERPL-MCNC: 0.94 MG/DL (ref 0.67–1.17)
CREAT UR-MCNC: 77.5 MG/DL
DEPRECATED HCO3 PLAS-SCNC: 25 MMOL/L (ref 22–29)
EGFRCR SERPLBLD CKD-EPI 2021: 88 ML/MIN/1.73M2
ERYTHROCYTE [DISTWIDTH] IN BLOOD BY AUTOMATED COUNT: 12.8 % (ref 10–15)
GLUCOSE SERPL-MCNC: 145 MG/DL (ref 70–99)
HBA1C MFR BLD: 7 %
HCT VFR BLD AUTO: 42.5 % (ref 40–53)
HGB BLD-MCNC: 14.2 G/DL (ref 13.3–17.7)
MCH RBC QN AUTO: 29.6 PG (ref 26.5–33)
MCHC RBC AUTO-ENTMCNC: 33.4 G/DL (ref 31.5–36.5)
MCV RBC AUTO: 89 FL (ref 78–100)
PLATELET # BLD AUTO: 237 10E3/UL (ref 150–450)
POTASSIUM SERPL-SCNC: 4 MMOL/L (ref 3.4–5.3)
PROT/CREAT 24H UR: 0.17 MG/MG CR (ref 0–0.2)
RBC # BLD AUTO: 4.79 10E6/UL (ref 4.4–5.9)
SODIUM SERPL-SCNC: 138 MMOL/L (ref 136–145)
TACROLIMUS BLD-MCNC: 5.5 UG/L (ref 5–15)
TME LAST DOSE: NORMAL H
TME LAST DOSE: NORMAL H
WBC # BLD AUTO: 5.7 10E3/UL (ref 4–11)

## 2023-09-26 PROCEDURE — 99000 SPECIMEN HANDLING OFFICE-LAB: CPT | Performed by: PATHOLOGY

## 2023-09-26 PROCEDURE — 80048 BASIC METABOLIC PNL TOTAL CA: CPT | Performed by: PATHOLOGY

## 2023-09-26 PROCEDURE — 85027 COMPLETE CBC AUTOMATED: CPT | Performed by: PATHOLOGY

## 2023-09-26 PROCEDURE — 80197 ASSAY OF TACROLIMUS: CPT | Performed by: INTERNAL MEDICINE

## 2023-09-26 PROCEDURE — 84156 ASSAY OF PROTEIN URINE: CPT | Performed by: PATHOLOGY

## 2023-09-26 PROCEDURE — 36415 COLL VENOUS BLD VENIPUNCTURE: CPT | Performed by: PATHOLOGY

## 2023-09-26 PROCEDURE — 83036 HEMOGLOBIN GLYCOSYLATED A1C: CPT | Performed by: PHYSICIAN ASSISTANT

## 2023-09-26 RX ORDER — ALBUTEROL SULFATE 90 UG/1
1-2 AEROSOL, METERED RESPIRATORY (INHALATION)
Status: CANCELLED
Start: 2023-09-26

## 2023-09-26 RX ORDER — METHYLPREDNISOLONE SODIUM SUCCINATE 125 MG/2ML
125 INJECTION, POWDER, LYOPHILIZED, FOR SOLUTION INTRAMUSCULAR; INTRAVENOUS
Status: CANCELLED
Start: 2023-09-26

## 2023-09-26 RX ORDER — HEPARIN SODIUM,PORCINE 10 UNIT/ML
5-20 VIAL (ML) INTRAVENOUS DAILY PRN
Status: CANCELLED | OUTPATIENT
Start: 2023-09-26

## 2023-09-26 RX ORDER — DIPHENHYDRAMINE HYDROCHLORIDE 50 MG/ML
50 INJECTION INTRAMUSCULAR; INTRAVENOUS
Status: CANCELLED
Start: 2023-09-26

## 2023-09-26 RX ORDER — ALBUTEROL SULFATE 0.83 MG/ML
2.5 SOLUTION RESPIRATORY (INHALATION)
Status: CANCELLED | OUTPATIENT
Start: 2023-09-26

## 2023-09-26 RX ORDER — MEPERIDINE HYDROCHLORIDE 25 MG/ML
25 INJECTION INTRAMUSCULAR; INTRAVENOUS; SUBCUTANEOUS EVERY 30 MIN PRN
Status: CANCELLED | OUTPATIENT
Start: 2023-09-26

## 2023-09-26 RX ORDER — HEPARIN SODIUM (PORCINE) LOCK FLUSH IV SOLN 100 UNIT/ML 100 UNIT/ML
5 SOLUTION INTRAVENOUS
Status: CANCELLED | OUTPATIENT
Start: 2023-09-26

## 2023-09-26 RX ORDER — EPINEPHRINE 1 MG/ML
0.3 INJECTION, SOLUTION, CONCENTRATE INTRAVENOUS EVERY 5 MIN PRN
Status: CANCELLED | OUTPATIENT
Start: 2023-09-26

## 2023-09-26 NOTE — TELEPHONE ENCOUNTER
ISSUE: Hypercalcemia: Ca: 11.5    PLAN:  Giovany Quijano MD Sveiven, Sara, RN  Stable kidney function, but markedly increased serum calcium level.  Recommend stopping oral calcium supplement and recommend a liter of IV fluids.    Did touch base with post heart tx RNCC regarding IVF.     OUTCOME:    Left detailed message for the patient with instructions to discontinue citracal.  Let patient know that IVF orders have been placed and that [facility] staff will be reaching out to schedule the patient for 1L IVF.  Asked for CB to confirm message received and understood.  My chart message also sent.    IVF orders placed and message sent to [facility].    Citrical RX discontinued.    Colette Martin RN   Transplant Coordinator  [phone]

## 2023-09-27 ENCOUNTER — INFUSION THERAPY VISIT (OUTPATIENT)
Dept: INFUSION THERAPY | Facility: CLINIC | Age: 68
End: 2023-09-27
Attending: INTERNAL MEDICINE
Payer: MEDICARE

## 2023-09-27 VITALS
TEMPERATURE: 98.2 F | RESPIRATION RATE: 16 BRPM | OXYGEN SATURATION: 100 % | HEART RATE: 92 BPM | DIASTOLIC BLOOD PRESSURE: 93 MMHG | SYSTOLIC BLOOD PRESSURE: 153 MMHG

## 2023-09-27 DIAGNOSIS — E83.52 HYPERCALCEMIA: Primary | ICD-10-CM

## 2023-09-27 PROCEDURE — 96360 HYDRATION IV INFUSION INIT: CPT

## 2023-09-27 PROCEDURE — 258N000003 HC RX IP 258 OP 636: Performed by: INTERNAL MEDICINE

## 2023-09-27 RX ORDER — ALBUTEROL SULFATE 0.83 MG/ML
2.5 SOLUTION RESPIRATORY (INHALATION)
Status: CANCELLED | OUTPATIENT
Start: 2023-09-27

## 2023-09-27 RX ORDER — EPINEPHRINE 1 MG/ML
0.3 INJECTION, SOLUTION INTRAMUSCULAR; SUBCUTANEOUS EVERY 5 MIN PRN
Status: CANCELLED | OUTPATIENT
Start: 2023-09-27

## 2023-09-27 RX ORDER — HEPARIN SODIUM,PORCINE 10 UNIT/ML
5-20 VIAL (ML) INTRAVENOUS DAILY PRN
Status: CANCELLED | OUTPATIENT
Start: 2023-09-27

## 2023-09-27 RX ORDER — ALBUTEROL SULFATE 90 UG/1
1-2 AEROSOL, METERED RESPIRATORY (INHALATION)
Status: CANCELLED
Start: 2023-09-27

## 2023-09-27 RX ORDER — HEPARIN SODIUM (PORCINE) LOCK FLUSH IV SOLN 100 UNIT/ML 100 UNIT/ML
5 SOLUTION INTRAVENOUS
Status: CANCELLED | OUTPATIENT
Start: 2023-09-27

## 2023-09-27 RX ORDER — METHYLPREDNISOLONE SODIUM SUCCINATE 125 MG/2ML
125 INJECTION, POWDER, LYOPHILIZED, FOR SOLUTION INTRAMUSCULAR; INTRAVENOUS
Status: CANCELLED
Start: 2023-09-27

## 2023-09-27 RX ORDER — DIPHENHYDRAMINE HYDROCHLORIDE 50 MG/ML
50 INJECTION INTRAMUSCULAR; INTRAVENOUS
Status: CANCELLED
Start: 2023-09-27

## 2023-09-27 RX ORDER — MEPERIDINE HYDROCHLORIDE 25 MG/ML
25 INJECTION INTRAMUSCULAR; INTRAVENOUS; SUBCUTANEOUS EVERY 30 MIN PRN
Status: CANCELLED | OUTPATIENT
Start: 2023-09-27

## 2023-09-27 RX ADMIN — SODIUM CHLORIDE 1000 ML: 9 INJECTION, SOLUTION INTRAVENOUS at 13:41

## 2023-09-27 NOTE — PATIENT INSTRUCTIONS
Dear Murray Nicholson    Thank you for choosing [facility] ([facility]) for your infusion.  The following information is a summary of our appointment as well as important reminders.        If you are a transplant patient and require transplant education, please click on this link: [email or web address].    We look forward in seeing you on your next appointment here at [facility] ([facility]).  Please don t hesitate to call us at [phone] to reschedule any of your appointments or to speak with one of the [facility] registered nurses.  It was a pleasure taking care of you today.    Sincerely,    [facility]  [facility]  [address] [place] [address]  [id number]  Phone:  [phone]

## 2023-09-27 NOTE — PROGRESS NOTES
Infusion Nursing Note:  Murray Nicholson presents today for IVF.    Patient seen by provider today: No   present during visit today: Not Applicable.    Note: Pt given 1L NS over an hour without incident.      Intravenous Access:  Peripheral IV placed.    Treatment Conditions:  Not Applicable.      Post Infusion Assessment:  Patient tolerated infusion without incident.  Blood return noted pre and post infusion.  Site patent and intact, free from redness, edema or discomfort.  No evidence of extravasations.  Access discontinued per protocol.       Discharge Plan:   Discharge instructions reviewed with: Patient.  Patient and/or family verbalized understanding of discharge instructions and all questions answered.  AVS to patient via [employer].  Patient will return per MD for next appointment.   Patient discharged in stable condition accompanied by: self.  Departure Mode: Ambulatory.    Administrations This Visit       sodium chloride 0.9% BOLUS 1,000 mL       Admin Date  09/27/2023 Action  $New Bag Dose  1,000 mL Route  Intravenous Administered By  Nae Conway RN                      Nae Conway RN

## 2023-09-28 ENCOUNTER — TELEPHONE (OUTPATIENT)
Dept: TRANSPLANT | Facility: CLINIC | Age: 68
End: 2023-09-28
Payer: MEDICARE

## 2023-09-28 DIAGNOSIS — Z94.0 KIDNEY REPLACED BY TRANSPLANT: Primary | ICD-10-CM

## 2023-09-28 NOTE — TELEPHONE ENCOUNTER
ISSUE: IVF received yesterday 9/27 for hypercalcemia.  Patient will need follow up labs.    OUTCOME:  Unable to LM.  Lab orders placed. My chart sent.    Colette Martin RN   Transplant Coordinator  [phone]

## 2023-10-13 RX ORDER — BLOOD-GLUCOSE METER
EACH MISCELLANEOUS
COMMUNITY
Start: 2023-07-24

## 2023-10-16 ENCOUNTER — LAB (OUTPATIENT)
Dept: LAB | Facility: CLINIC | Age: 68
End: 2023-10-16
Payer: MEDICARE

## 2023-10-16 DIAGNOSIS — Z94.0 KIDNEY REPLACED BY TRANSPLANT: ICD-10-CM

## 2023-10-16 LAB
ALBUMIN MFR UR ELPH: 11.5 MG/DL
ANION GAP SERPL CALCULATED.3IONS-SCNC: 10 MMOL/L (ref 7–15)
BUN SERPL-MCNC: 13.7 MG/DL (ref 8–23)
CALCIUM SERPL-MCNC: 10 MG/DL (ref 8.8–10.2)
CHLORIDE SERPL-SCNC: 103 MMOL/L (ref 98–107)
CREAT SERPL-MCNC: 1.11 MG/DL (ref 0.67–1.17)
CREAT UR-MCNC: 78 MG/DL
DEPRECATED HCO3 PLAS-SCNC: 25 MMOL/L (ref 22–29)
EGFRCR SERPLBLD CKD-EPI 2021: 72 ML/MIN/1.73M2
ERYTHROCYTE [DISTWIDTH] IN BLOOD BY AUTOMATED COUNT: 12.9 % (ref 10–15)
GLUCOSE SERPL-MCNC: 193 MG/DL (ref 70–99)
HCT VFR BLD AUTO: 40.6 % (ref 40–53)
HGB BLD-MCNC: 13.3 G/DL (ref 13.3–17.7)
MCH RBC QN AUTO: 29.2 PG (ref 26.5–33)
MCHC RBC AUTO-ENTMCNC: 32.8 G/DL (ref 31.5–36.5)
MCV RBC AUTO: 89 FL (ref 78–100)
PLATELET # BLD AUTO: 210 10E3/UL (ref 150–450)
POTASSIUM SERPL-SCNC: 4.2 MMOL/L (ref 3.4–5.3)
PROT/CREAT 24H UR: 0.15 MG/MG CR (ref 0–0.2)
RBC # BLD AUTO: 4.55 10E6/UL (ref 4.4–5.9)
SODIUM SERPL-SCNC: 138 MMOL/L (ref 135–145)
TACROLIMUS BLD-MCNC: 4.6 UG/L (ref 5–15)
TME LAST DOSE: ABNORMAL H
TME LAST DOSE: ABNORMAL H
WBC # BLD AUTO: 5.2 10E3/UL (ref 4–11)

## 2023-10-16 PROCEDURE — 80197 ASSAY OF TACROLIMUS: CPT | Performed by: INTERNAL MEDICINE

## 2023-10-16 PROCEDURE — 80048 BASIC METABOLIC PNL TOTAL CA: CPT | Performed by: PATHOLOGY

## 2023-10-16 PROCEDURE — 36415 COLL VENOUS BLD VENIPUNCTURE: CPT | Performed by: PATHOLOGY

## 2023-10-16 PROCEDURE — 99000 SPECIMEN HANDLING OFFICE-LAB: CPT | Performed by: PATHOLOGY

## 2023-10-16 PROCEDURE — 84156 ASSAY OF PROTEIN URINE: CPT | Performed by: PATHOLOGY

## 2023-10-16 PROCEDURE — 85027 COMPLETE CBC AUTOMATED: CPT | Performed by: PATHOLOGY

## 2023-10-17 DIAGNOSIS — Z94.1 HEART REPLACED BY TRANSPLANT (H): ICD-10-CM

## 2023-10-17 RX ORDER — ATORVASTATIN CALCIUM 40 MG/1
40 TABLET, FILM COATED ORAL EVERY EVENING
Qty: 30 TABLET | Refills: 11 | Status: SHIPPED | OUTPATIENT
Start: 2023-10-17

## 2023-10-18 ENCOUNTER — OFFICE VISIT (OUTPATIENT)
Dept: URGENT CARE | Facility: URGENT CARE | Age: 68
End: 2023-10-18
Payer: MEDICARE

## 2023-10-18 VITALS
HEIGHT: 68 IN | DIASTOLIC BLOOD PRESSURE: 86 MMHG | OXYGEN SATURATION: 100 % | HEART RATE: 88 BPM | WEIGHT: 180 LBS | SYSTOLIC BLOOD PRESSURE: 133 MMHG | TEMPERATURE: 98.1 F | BODY MASS INDEX: 27.28 KG/M2

## 2023-10-18 DIAGNOSIS — R35.0 URINARY FREQUENCY: ICD-10-CM

## 2023-10-18 DIAGNOSIS — B37.42 CANDIDAL BALANITIS: Primary | ICD-10-CM

## 2023-10-18 LAB
ALBUMIN UR-MCNC: ABNORMAL MG/DL
APPEARANCE UR: CLEAR
BILIRUB UR QL STRIP: NEGATIVE
COLOR UR AUTO: YELLOW
GLUCOSE UR STRIP-MCNC: >=1000 MG/DL
HGB UR QL STRIP: NEGATIVE
KETONES UR STRIP-MCNC: NEGATIVE MG/DL
LEUKOCYTE ESTERASE UR QL STRIP: NEGATIVE
NITRATE UR QL: NEGATIVE
PH UR STRIP: 7 [PH] (ref 5–7)
RBC #/AREA URNS AUTO: NORMAL /HPF
SP GR UR STRIP: 1.01 (ref 1–1.03)
UROBILINOGEN UR STRIP-ACNC: 0.2 E.U./DL
WBC #/AREA URNS AUTO: NORMAL /HPF

## 2023-10-18 PROCEDURE — 99213 OFFICE O/P EST LOW 20 MIN: CPT | Performed by: NURSE PRACTITIONER

## 2023-10-18 PROCEDURE — 81001 URINALYSIS AUTO W/SCOPE: CPT | Performed by: NURSE PRACTITIONER

## 2023-10-18 RX ORDER — CLOTRIMAZOLE 1 %
CREAM (GRAM) TOPICAL 2 TIMES DAILY
Qty: 85 G | Refills: 0 | Status: SHIPPED | OUTPATIENT
Start: 2023-10-18 | End: 2023-10-28

## 2023-10-18 NOTE — PROGRESS NOTES
Candidal balanitis  - clotrimazole (LOTRIMIN) 1 % external cream; Apply topically 2 times daily for 10 days    Urinary frequency  - UA Macroscopic with reflex to Microscopic and Culture - Clinic Collect  - UA Microscopic with Reflex to Culture    PLAN:  --UA done in clinic considering increased urinary frequency which is non infectious.   --Physical exam and history consistent with yeast balanitis   --Clotrimazole cream sent in to pharmacy     Follow-up:   Patient was advised to return to clinic for reevaluation (either UC or PCP) if symptoms do not improve in 7-10 days. If symptoms get much worse with pain, purulent discharge, fevers, should be re-evaluated sooner.    Chief Complaint   Patient presents with    Penis/Scrotum Problem     X4 days ago noticed itching and discoloration under foreskin        Subjective   HPI   Murray Nicholson is a 68 year old male who presents with 4-5 days of penile itching and redness that he first noticed while showering. He reports retracting the foreskin and noticed some redness of the penis and that the tip was less pink colored than normal. He says the itchiness has improved today. He also has noticed increased frequency of urination. He is not concerned about STIs today, has 1 exclusive partner. Denies urgency of urination, burning or pain with urination, penile discharge, pain, hematuria, fevers.  He does have diabetes on metformin and does report possible increased sugar intake.      Past Medical History:   Diagnosis Date    (HFpEF) heart failure with preserved ejection fraction (H)     Allergic rhinitis, cause unspecified     Anemia of chronic kidney failure     Aortic stenosis 08/13/2008    Overview:  Bicuspid aortic valve    AS (aortic stenosis)     Ascending aortic aneurysm (H24)     Bicuspid aortic valve     Bilateral hand pain 06/01/2021    CAD (coronary artery disease)     Congestive heart failure, unspecified     Coronary artery disease involving native artery of  transplanted heart without angina pectoris 07/10/2014    Dialysis patient (H24)     [facility]    Dyslipidemia     Esophageal reflux     ESRD (end stage renal disease) (H)     Hearing problem     Heart replaced by transplant (H) 12/10/2018    Hypersomnia with sleep apnea, unspecified     Hypertension     Ileostomy status (H)     Immunosuppression (H24)     MGUS (monoclonal gammopathy of unknown significance)     Mitral regurgitation     SHEELA (obstructive sleep apnea)     No CPAP    Pneumonia     Sigmoid diverticulitis 08/14/2018    Status post coronary angiogram 06/28/2019    Systolic heart failure (H)     Type 2 diabetes mellitus (H)     Ventral hernia without obstruction or gangrene      Current Outpatient Medications   Medication Sig Dispense Refill    alcohol swab prep pads Use to swab area of injection/davida as directed. 300 each 6    amLODIPine (NORVASC) 5 MG tablet Take 1 tablet (5 mg) by mouth daily 90 tablet 3    aspirin 81 MG EC tablet Take 81 mg by mouth every evening      atorvastatin (LIPITOR) 40 MG tablet Take 1 tablet (40 mg) by mouth every evening 30 tablet 11    biotin 1000 MCG TABS tablet Take 1,000 mcg by mouth every morning Patient takes every other day      blood glucose (ACCU-CHEK GUIDE) test strip Use to test blood sugar 3 times daily or as directed. 300 strip 1    blood glucose (ACCU-CHEK SOFTCLIX) lancing device Lancing device to be used with lancets. Test blood sugar 3 times daily or as directed 1 each 0    blood glucose (NO BRAND SPECIFIED) test strip Use to test blood sugar 3 times daily or as directed. Any covered brand that works with meter. 300 strip 1    blood glucose monitoring (ACCU-CHEK FASTCLIX) lancets USE TO TEST 3 TIMES DAILY OR AS DIRECTED. 300 each 1    blood glucose monitoring (SOFTCLIX) lancets Use to test blood sugar 3 times daily. 300 each 6    Blood Glucose Monitoring Suppl (ACCU-CHEK GUIDE) w/Device KIT USE TO TEST BLOOD SUGAR THREE TIMES DAILY AS DIRECTED.       Calcium Polycarbophil (FIBER) 625 MG tablet Take by mouth every morning      co-enzyme Q-10 30 MG CAPS capsule Take by mouth every morning      empagliflozin (JARDIANCE) 10 MG TABS tablet Take 1 tablet (10 mg) by mouth daily 90 tablet 1    loratadine (CLARITIN) 10 MG tablet Take 10 mg by mouth every evening Patient takes at bedtime      magnesium 250 MG tablet Take 1 tablet by mouth every morning      metFORMIN (GLUCOPHAGE XR) 500 MG 24 hr tablet Take 4 tabs every  tablet 3    Multiple Vitamins-Minerals (HAIR SKIN NAILS PO) Take by mouth every morning      mycophenolate (GENERIC EQUIVALENT) 250 MG capsule Take 2 capsules (500 mg) by mouth 2 times daily 120 capsule 11    OMEPRAZOLE PO Take 20 mg by mouth every morning      sulfamethoxazole-trimethoprim (BACTRIM) 400-80 MG tablet Take 1 tablet by mouth every morning 90 tablet 3    tacrolimus (GENERIC EQUIVALENT) 0.5 MG capsule Take 1 capsule (0.5 mg) by mouth 2 times daily 180 capsule 3    tacrolimus (GENERIC EQUIVALENT) 1 MG capsule ON HOLD due to dose change 90 capsule 3    thin (NO BRAND SPECIFIED) lancets Use with lanceting device 3x daily. Any covered brand that works with lancing device. 300 each 1     Social History     Tobacco Use    Smoking status: Former     Packs/day: 1.00     Years: 20.00     Additional pack years: 0.00     Total pack years: 20.00     Types: Cigarettes     Start date: 1984     Quit date: 1994     Years since quittin.1    Smokeless tobacco: Never   Substance Use Topics    Alcohol use: Yes     Comment: Occasionally        Allergies   Allergen Reactions    Cats Shortness Of Breath and Anaphylaxis    Penicillins Hives    Shrimp Shortness Of Breath and Anaphylaxis    Isosorbide Nitrate      hypotension    Norco [Hydrocodone-Acetaminophen] Nausea and Vomiting     Review of Systems   All systems negative except for those listed above in HPI.      Objective    Temp: 98.1  F (36.7  C) Temp src: Temporal BP: 133/86 Pulse: 88      SpO2: 100 %         Physical Exam  Vitals reviewed. Exam conducted with a chaperone present.   Constitutional:       Appearance: Normal appearance.   Genitourinary:     Penis: Erythema (patchy red erythema with white curd-like exudate) present. No phimosis, tenderness, discharge or swelling.    Neurological:      General: No focal deficit present.      Mental Status: He is alert.   Psychiatric:         Mood and Affect: Mood normal.         Behavior: Behavior normal.       Jennifer Swanson NP Student  NATALIA Garcia-BC    [facility]

## 2023-10-21 ENCOUNTER — IMMUNIZATION (OUTPATIENT)
Dept: FAMILY MEDICINE | Facility: CLINIC | Age: 68
End: 2023-10-21
Payer: MEDICARE

## 2023-10-21 PROCEDURE — 91320 SARSCV2 VAC 30MCG TRS-SUC IM: CPT

## 2023-10-21 PROCEDURE — 90480 ADMN SARSCOV2 VAC 1/ONLY CMP: CPT

## 2023-10-26 NOTE — PROGRESS NOTES
Outcome for 10/26/23 11:09 AM: Dry Lube message sent  Paulina Curiel MA  Outcome for 11/01/23 10:57 AM: Per patient, will send BG readings via [employer] Sewell LPN   Outcome for 11/02/23 6:37 AM: Glucose readings sent via [employer] Sewell LPN       Patient is showing 5/5 MNCM met.   Niyah Sewell LPN

## 2023-11-01 ENCOUNTER — TELEPHONE (OUTPATIENT)
Dept: ENDOCRINOLOGY | Facility: CLINIC | Age: 68
End: 2023-11-01
Payer: MEDICARE

## 2023-11-01 NOTE — TELEPHONE ENCOUNTER
Spoke with patient in regards to obtaining blood glucose data for appointment scheduled on 11/3/2023. Patient will send readings via [facility].    Niyah Sewell LPN 11/01/23 10:56 AM

## 2023-11-03 ENCOUNTER — VIRTUAL VISIT (OUTPATIENT)
Dept: ENDOCRINOLOGY | Facility: CLINIC | Age: 68
End: 2023-11-03
Payer: MEDICARE

## 2023-11-03 ENCOUNTER — TELEPHONE (OUTPATIENT)
Dept: ENDOCRINOLOGY | Facility: CLINIC | Age: 68
End: 2023-11-03

## 2023-11-03 VITALS — BODY MASS INDEX: 27.67 KG/M2 | WEIGHT: 182 LBS

## 2023-11-03 DIAGNOSIS — E11.22 TYPE 2 DIABETES MELLITUS WITH CHRONIC KIDNEY DISEASE, WITHOUT LONG-TERM CURRENT USE OF INSULIN, UNSPECIFIED CKD STAGE (H): Primary | ICD-10-CM

## 2023-11-03 PROCEDURE — 99214 OFFICE O/P EST MOD 30 MIN: CPT | Mod: VID | Performed by: PHYSICIAN ASSISTANT

## 2023-11-03 NOTE — LETTER
11/3/2023       RE: Murray Nicholson  [address]  [place] [address]     Dear Colleague,    Thank you for referring your patient, Murray Nicholson, to the [facility] [place] at [facility]. Please see a copy of my visit note below.    Outcome for 10/26/23 11:09 AM: VeedMe message sent  Paulina Curiel MA  Outcome for 11/01/23 10:57 AM: Per patient, will send BG readings via Kiraxyared Sewell LPN   Outcome for 11/02/23 6:37 AM: Glucose readings sent via Kiraxyared Sewell LPN       Patient is showing 5/5 MNCM met.   Niyah Sewell LPN      Time of start: 9:30 am   Time of end: 9:45 am   Total duration of video visit: 15 minutes.  Providers location: offsite.  Patients location: [place].    \[facility]\""  Murray Nicholson is a 68 year old male with type 2 diabetes mellitus. Video visit for diabetes follow up today.  Last visit I added Jardiance which he is tolerating well with improvement in his A1C value.  Pt gives a hx of type 2 diabetes > 20 years complicated by ESRD s/p kidney transplant in Dec 2019.   Pt denies known hx of retinopathy or sx of neuropathy.  He has hx CAD s/p heart transplant in June 2018.  Pt also has hx of HTN, hyperlipidemia, ascending aortic aneurysm and GERD.  For his diabetes, he is currently taking Jardiance 10 mg each am and Metformin 500 mg 4 tabs in am.  Most recent A1C was 7.0 % on 9/26/2023.   Previous A1C was 7.3 % on 6/19/2023.   He provided me with a few blood sugar readings today which I scanned in his note below.  Most of these blood sugar values are fasting and good values.  On ROS today, he is eating healthy and smaller portions and less sweets.  He remains activity and walks daily.  Denies blurred vision, n/v, SOB at rest, cough, fever, chest pain or abd pain.  No diarrhea, dysuria or hematuria.  He denies sx of neuropathy or foot ulcers.  No sx of groin yeast infection.    Diabetes  Care  Retinopathy: none per patient; pt seen by Oph in 9/2023 without retinopathy.  Nephropathy:hx of ESRD s/p kidney transplant in 12/2019. Urine microalbuminuria negative in 10/2023.  Neuropathy: none per patient.  Foot Exam: no exam today.  Taking aspirin: yes.  Lipids: LDL 64 in 6/2023. Pt taking Lipitor.  Insulin: none.  DM meds: Metformin and Jardiance.  Testing: glucose meter.        ROS  See under HPI.      Allergies  Allergies   Allergen Reactions    Cats Shortness Of Breath and Anaphylaxis    Penicillins Hives    Shrimp Shortness Of Breath and Anaphylaxis    Isosorbide Nitrate      hypotension    Norco [Hydrocodone-Acetaminophen] Nausea and Vomiting       Medications  Current Outpatient Medications   Medication Sig Dispense Refill    alcohol swab prep pads Use to swab area of injection/davida as directed. 300 each 6    amLODIPine (NORVASC) 5 MG tablet Take 1 tablet (5 mg) by mouth daily 90 tablet 3    aspirin 81 MG EC tablet Take 81 mg by mouth every evening      atorvastatin (LIPITOR) 40 MG tablet Take 1 tablet (40 mg) by mouth every evening 30 tablet 11    biotin 1000 MCG TABS tablet Take 1,000 mcg by mouth every morning Patient takes every other day      blood glucose (ACCU-CHEK GUIDE) test strip Use to test blood sugar 3 times daily or as directed. 300 strip 1    blood glucose (ACCU-CHEK SOFTCLIX) lancing device Lancing device to be used with lancets. Test blood sugar 3 times daily or as directed 1 each 0    blood glucose (NO BRAND SPECIFIED) test strip Use to test blood sugar 3 times daily or as directed. Any covered brand that works with meter. 300 strip 1    blood glucose monitoring (ACCU-CHEK FASTCLIX) lancets USE TO TEST 3 TIMES DAILY OR AS DIRECTED. 300 each 1    blood glucose monitoring (SOFTCLIX) lancets Use to test blood sugar 3 times daily. 300 each 6    Blood Glucose Monitoring Suppl (ACCU-CHEK GUIDE) w/Device KIT USE TO TEST BLOOD SUGAR THREE TIMES DAILY AS DIRECTED.      Calcium  Polycarbophil (FIBER) 625 MG tablet Take by mouth every morning      co-enzyme Q-10 30 MG CAPS capsule Take by mouth every morning      empagliflozin (JARDIANCE) 10 MG TABS tablet Take 1 tablet (10 mg) by mouth daily 90 tablet 1    loratadine (CLARITIN) 10 MG tablet Take 10 mg by mouth every evening Patient takes at bedtime      magnesium 250 MG tablet Take 1 tablet by mouth every morning      metFORMIN (GLUCOPHAGE XR) 500 MG 24 hr tablet Take 4 tabs every  tablet 3    Multiple Vitamins-Minerals (HAIR SKIN NAILS PO) Take by mouth every morning      mycophenolate (GENERIC EQUIVALENT) 250 MG capsule Take 2 capsules (500 mg) by mouth 2 times daily 120 capsule 11    OMEPRAZOLE PO Take 20 mg by mouth every morning      sulfamethoxazole-trimethoprim (BACTRIM) 400-80 MG tablet Take 1 tablet by mouth every morning 90 tablet 3    tacrolimus (GENERIC EQUIVALENT) 0.5 MG capsule Take 1 capsule (0.5 mg) by mouth 2 times daily 180 capsule 3    tacrolimus (GENERIC EQUIVALENT) 1 MG capsule ON HOLD due to dose change 90 capsule 3    thin (NO BRAND SPECIFIED) lancets Use with lanceting device 3x daily. Any covered brand that works with lancing device. 300 each 1       Family History  family history includes C.A.D. in his father; Cerebrovascular Disease in his father; Diabetes in his father; Hypertension in his father.    Social History   reports that he quit smoking about 29 years ago. His smoking use included cigarettes. He started smoking about 39 years ago. He has a 20.00 pack-year smoking history. He has never used smokeless tobacco. He reports current alcohol use. He reports that he does not use drugs.     Past Medical History  Past Medical History:   Diagnosis Date    (HFpEF) heart failure with preserved ejection fraction (H)     Allergic rhinitis, cause unspecified     Anemia of chronic kidney failure     Aortic stenosis 08/13/2008    Overview:  Bicuspid aortic valve    AS (aortic stenosis)     Ascending aortic aneurysm  (H24)     Bicuspid aortic valve     Bilateral hand pain 06/01/2021    CAD (coronary artery disease)     Congestive heart failure, unspecified     Coronary artery disease involving native artery of transplanted heart without angina pectoris 07/10/2014    Dialysis patient (H24)     Tues-Thur-Sat    Dyslipidemia     Esophageal reflux     ESRD (end stage renal disease) (H)     Hearing problem     Heart replaced by transplant (H) 12/10/2018    Hypersomnia with sleep apnea, unspecified     Hypertension     Ileostomy status (H)     Immunosuppression (H24)     MGUS (monoclonal gammopathy of unknown significance)     Mitral regurgitation     SHEELA (obstructive sleep apnea)     No CPAP    Pneumonia     Sigmoid diverticulitis 08/14/2018    Status post coronary angiogram 06/28/2019    Systolic heart failure (H)     Type 2 diabetes mellitus (H)     Ventral hernia without obstruction or gangrene        Past Surgical History:   Procedure Laterality Date    BIOPSY      CARDIAC SURGERY      COLONOSCOPY N/A 5/3/2018    Procedure: COLONOSCOPY;  colonoscopy ;  Surgeon: Ammon Castillo MD;  [facility]    COLONOSCOPY N/A 5/26/2023    Procedure: COLONOSCOPY, WITH POLYPECTOMY via bx forceps;  Surgeon: Francisco Bland MD;  [facility]    CREATE FISTULA ARTERIOVENOUS UPPER EXTREMITY BOVINE Left 5/8/2019    Procedure: Left Upper Extremity Arteriovenous Bovine Graft Creation;  Surgeon: Calin Cheney MD;  [facility]    CV CORONARY ANGIOGRAM N/A 6/28/2019    Procedure: CV CORONARY ANGIOGRAM;  Surgeon: Montrell Posada MD;  [facility]    CV CORONARY ANGIOGRAM N/A 7/15/2020    Procedure: CV CORONARY ANGIOGRAM;  Surgeon: Marcelo Ramírez MD;  [facility]    CV CORONARY ANGIOGRAM N/A 6/7/2021    Procedure: CV CORONARY ANGIOGRAM;  Surgeon: Gilberto Mccann MD;  Location:  [facility] CATH LAB    CV CORONARY ANGIOGRAM N/A 6/13/2022    Procedure: Coronary Angiogram;   Surgeon: Marcelo Ramírez MD;  [facility]    CV HEART BIOPSY N/A 2/1/2019    Procedure: HBX;  Surgeon: Montrell Posada MD;  [facility]    CV HEART BIOPSY N/A 3/22/2019    Procedure: HBX, RIJV ACCESS;  Surgeon: Jordan Fox MD;  [facility]    CV HEART BIOPSY N/A 6/28/2019    Procedure: CV HEART BIOPSY;  Surgeon: Montrell Posada MD;  [facility]    CV HEART BIOPSY N/A 10/28/2019    Procedure: CV HEART BIOPSY;  Surgeon: Marcelo Ramírez MD;  Location:  [facility] CARDIAC CATH LAB    CV HEART BIOPSY N/A 2/6/2020    Procedure: CV HEART BIOPSY;  Surgeon: Montrell Posada MD;  Location:  HE[facility] CARDIAC CATH LAB    CV HEART BIOPSY N/A 7/15/2020    Procedure: CV HEART BIOPSY;  Surgeon: Marcelo Ramírez MD;  Location:  [facility] CARDIAC CATH LAB    CV RIGHT HEART CATH MEASUREMENTS RECORDED N/A 6/28/2019    Procedure: CV RIGHT HEART CATH;  Surgeon: Montrell Posada MD;  Location:  HEART CARDIAC CATH LAB    CV RIGHT HEART CATH MEASUREMENTS RECORDED N/A 7/15/2020    Procedure: CV RIGHT HEART CATH;  Surgeon: Marcelo Ramírez MD;  Location:  [facility] CARDIAC CATH LAB    CV RIGHT HEART CATH MEASUREMENTS RECORDED N/A 6/7/2021    Procedure: CV RIGHT HEART CATH;  Surgeon: Gilberto Mccann MD;  Location:  [facility] CARDIAC CATH LAB    CV RIGHT HEART CATH MEASUREMENTS RECORDED N/A 6/13/2022    Procedure: Right Heart Catheterization;  Surgeon: Marcelo Ramírez MD;  Location:  HEART CARDIAC CATH LAB    ENDOSCOPIC ULTRASOUND UPPER GASTROINTESTINAL TRACT (GI) N/A 11/8/2021    Procedure: ENDOSCOPIC ULTRASOUND, ESOPHAGOSCOPY / UPPER GASTROINTESTINAL TRACT (GI)  EUS with FNA;  Surgeon: Alon Don MD;  Location:  OR    ESOPHAGOSCOPY, GASTROSCOPY, DUODENOSCOPY (EGD), COMBINED N/A 5/7/2018    Procedure: COMBINED ENDOSCOPIC ULTRASOUND, ESOPHAGOSCOPY, GASTROSCOPY, DUODENOSCOPY (EGD), FINE  NEEDLE ASPIRATE/BIOPSY;  Endoscopic Ultrasound with Fine Needle Aspiration ;  Surgeon: Alon Don MD;  [facility]    EXAM UNDER ANESTHESIA RECTUM N/A 8/12/2018    Procedure: EXAM UNDER ANESTHESIA RECTUM;  EXAM UNDER ANESTHESIA RECTUM ,COMBINED INCISION AND DRAINAGE OF RECTAL ABCESS ;  Surgeon: Rick Tran MD;  [facility]    HERNIORRHAPHY VENTRAL N/A 6/24/2022    Procedure: open mesh repair, incisional ventral hernia;  Surgeon: Shaheed Curtis MD;  [facility]    INCISION AND DRAINAGE RECTUM, COMBINED N/A 8/12/2018    Procedure: COMBINED INCISION AND DRAINAGE RECTUM;;  Surgeon: Rick Tran MD;  [facility]    IR DIALYSIS FISTULOGRAM LEFT  9/25/2019    IR DIALYSIS FISTULOGRAM LEFT  11/22/2019    IR DIALYSIS PTA  9/25/2019    IR DIALYSIS PTA  11/22/2019    LAPAROSCOPIC ASSISTED COLOSTOMY TAKEDOWN N/A 12/11/2018    Procedure: Laparoscopic Assisted Colostomy Takedown, Laparoscopic Lysis of Adhesions;  Surgeon: Rick Tran MD;  [facility]    LAPAROSCOPIC ASSISTED SIGMOID COLECTOMY N/A 8/14/2018    Procedure: LAPAROSCOPIC ASSISTED SIGMOID COLECTOMY;  Laparoscopic Hand Assisted Takedown of Splenic Flexure, Sigmoidectomy, Small Bowel Resection, Takedown of Small Bowel to Colon Fistula;  Surgeon: Rick Tran MD;  [facility]    LAPAROSCOPIC HERNIORRHAPHY INGUINAL BILATERAL Bilateral 7/24/2015    Procedure: LAPAROSCOPIC HERNIORRHAPHY INGUINAL BILATERAL;  Surgeon: Bobby Mcconnell MD;  [facility]    LAPAROSCOPIC INSERTION CATHETER PERITONEAL DIALYSIS N/A 6/22/2017    Procedure: LAPAROSCOPIC INSERTION CATHETER PERITONEAL DIALYSIS;  Laparoscopic Peritoneal Dialysis Catheter Placement - Anesthesia with block;  Surgeon: Esteban Arvizu MD;  [facility]    PICC INSERTION Left 04/22/2018    5Fr - 49cm (3cm external), Basilic vein, low SVC    REMOVE CATHETER PERITONEAL Right 1/15/2018    Procedure: REMOVE CATHETER  PERITONEAL;  Open Removal of Peritoneal Dialysis Catheter ;  Surgeon: Esteban Arivzu MD;  [facility]    SIGMOIDOSCOPY FLEXIBLE N/A 11/21/2018    Procedure: Examination Under Anesthesia, Flexible Sigmoidoscopy and Polypectomy;  Surgeon: Rick Tran MD;  [facility]    SIGMOIDOSCOPY FLEXIBLE N/A 12/11/2018    Procedure: Flexible Sigmoidoscopy;  Surgeon: Rick Tran MD;  [facility]    TAKEDOWN ILEOSTOMY N/A 3/27/2019    Procedure: Takedown Ileostomy;  Surgeon: Rick Tran MD;  [facility]    TRANSPLANT HEART RECIPIENT N/A 6/14/2018    Procedure: TRANSPLANT HEART RECIPIENT;  Median Sternotomy, on-pump oxygenator, Heart Transplant;  Surgeon: Rony Caputo MD;  [facility]    TRANSPLANT KIDNEY RECIPIENT LIVING UNRELATED  12/2019       Physical Exam    No exam today.    RESULTS  Creatinine   Date Value Ref Range Status   10/16/2023 1.11 0.67 - 1.17 mg/dL Final   07/06/2021 1.09 0.66 - 1.25 mg/dL Final     GFR Estimate   Date Value Ref Range Status   10/16/2023 72 >60 mL/min/1.73m2 Final   07/06/2021 70 >60 mL/min/[1.73_m2] Final     Comment:     Non  GFR Calc  Starting 12/18/2018, serum creatinine based estimated GFR (eGFR) will be   calculated using the Chronic Kidney Disease Epidemiology Collaboration   (CKD-EPI) equation.       Hemoglobin A1C   Date Value Ref Range Status   09/26/2023 7.0 (H) <5.7 % Final     Comment:     Normal <5.7%   Prediabetes 5.7-6.4%    Diabetes 6.5% or higher     Note: Adopted from ADA consensus guidelines.   06/07/2021 6.2 (H) 0 - 5.6 % Final     Comment:     Normal <5.7% Prediabetes 5.7-6.4%  Diabetes 6.5% or higher - adopted from ADA   consensus guidelines.       Potassium   Date Value Ref Range Status   10/16/2023 4.2 3.4 - 5.3 mmol/L Final   08/09/2022 3.7 3.4 - 5.3 mmol/L Final   07/06/2021 3.9 3.4 - 5.3 mmol/L Final     ALT   Date Value Ref Range Status   06/19/2023 21 0 - 70 U/L Final     Comment:      Reference intervals for this test were updated on 6/12/2023 to more accurately reflect our healthy population. There may be differences in the flagging of prior results with similar values performed with this method. Interpretation of those prior results can be made in the context of the updated reference intervals.     06/07/2021 28 0 - 70 U/L Final     AST   Date Value Ref Range Status   06/19/2023 30 0 - 45 U/L Final     Comment:     Specimen is hemolyzed which can falsely elevate AST. Analysis of a non-hemolyzed specimen may result in a lower value.    Reference intervals for this test were updated on 6/12/2023 to more accurately reflect our healthy population. There may be differences in the flagging of prior results with similar values performed with this method. Interpretation of those prior results can be made in the context of the updated reference intervals.   06/07/2021 27 0 - 45 U/L Final     TSH   Date Value Ref Range Status   09/07/2021 1.89 0.40 - 4.00 mU/L Final   04/16/2018 2.25 0.40 - 4.00 mU/L Final       Cholesterol   Date Value Ref Range Status   06/19/2023 152 <200 mg/dL Final   01/31/2023 130 <200 mg/dL Final   06/07/2021 120 <200 mg/dL Final   12/09/2020 116 <200 mg/dL Final     HDL Cholesterol   Date Value Ref Range Status   06/07/2021 49 >39 mg/dL Final   12/09/2020 47 >39 mg/dL Final     Direct Measure HDL   Date Value Ref Range Status   06/19/2023 47 >=40 mg/dL Final   01/31/2023 56 >=40 mg/dL Final     LDL Cholesterol Calculated   Date Value Ref Range Status   06/19/2023 64 <=100 mg/dL Final   01/31/2023 48 <=100 mg/dL Final   06/07/2021 28 <100 mg/dL Final     Comment:     Desirable:       <100 mg/dl   12/09/2020 26 <100 mg/dL Final     Comment:     Desirable:       <100 mg/dl     Triglycerides   Date Value Ref Range Status   06/19/2023 205 (H) <150 mg/dL Final   01/31/2023 129 <150 mg/dL Final   06/07/2021 215 (H) <150 mg/dL Final     Comment:     Borderline high:  150-199  mg/dl  High:             200-499 mg/dl  Very high:       >499 mg/dl     12/09/2020 214 (H) <150 mg/dL Final     Comment:     Borderline high:  150-199 mg/dl  High:             200-499 mg/dl  Very high:       >499 mg/dl       Cholesterol/HDL Ratio   Date Value Ref Range Status   08/10/2015 5.0 0.0 - 5.0 Final   04/09/2015 4.0 0.0 - 5.0 Final         ASSESSMENT/PLAN:    1.  TYPE 2 DIABETES MELLITUS: Patient's A1C has improved with addition of Jardiance.  A1C 7.0 % on 9/26/2023.  Reviewed possible side effects of Jardiance including dehydration, UTI and groin yeast infection.  Also reviewed the cardiovascular and renal benefits of using a SGLT-2 drug.  Reminded Murray to drink plenty of water.  Most recent creat 1.11 with GFR 72 mL/min in Oct 2023.  Continue current dose of Metformin.  Pt is eating healthy and exercising.  I asked pt to check his FBS each am and 2 hr postmeal blood sugar.  Pt seen by Oph in 9/2023 without retinopathy.  No sx of neuropathy and he denies foot ulcers.  No vitals today.    2.  HX OF ESRD: S/P kidney transplant in Dec 2019.  Most recent creat 1.11 with GFR 72 mL/min in 10/2023.    3.  CARDIAC: S/P heart transplant in 6/2018.    4.  LIPIDS: LDL 64 in 6/2023. Pt taking Lipitor.    5.  FOLLOW UP: With me in 6 months or sooner if needed.  Consider graduating from Endocrine Clinic next visit.    Time spent reviewing chart, labs and glucose data today =5  minutes.  Time for video visit today =  15 minutes.  Time for documentation today = 15 minutes.    Total time for visit today = 35 minutes.    Rosa Calvert PA-C

## 2023-11-03 NOTE — PROGRESS NOTES
Time of start: 9:30 am   Time of end: 9:45 am   Total duration of video visit: 15 minutes.  Providers location: offsite.  Patients location: [place].    HPI  Murray Nicholson is a 68 year old male with type 2 diabetes mellitus. Video visit for diabetes follow up today.  Last visit I added Jardiance which he is tolerating well with improvement in his A1C value.  Pt gives a hx of type 2 diabetes > 20 years complicated by ESRD s/p kidney transplant in Dec 2019.   Pt denies known hx of retinopathy or sx of neuropathy.  He has hx CAD s/p heart transplant in June 2018.  Pt also has hx of HTN, hyperlipidemia, ascending aortic aneurysm and GERD.  For his diabetes, he is currently taking Jardiance 10 mg each am and Metformin 500 mg 4 tabs in am.  Most recent A1C was 7.0 % on 9/26/2023.   Previous A1C was 7.3 % on 6/19/2023.   He provided me with a few blood sugar readings today which I scanned in his note below.  Most of these blood sugar values are fasting and good values.  On ROS today, he is eating healthy and smaller portions and less sweets.  He remains activity and walks daily.  Denies blurred vision, n/v, SOB at rest, cough, fever, chest pain or abd pain.  No diarrhea, dysuria or hematuria.  He denies sx of neuropathy or foot ulcers.  No sx of groin yeast infection.    Diabetes Care  Retinopathy: none per patient; pt seen by O[facility] in 9/2023 without retinopathy.  Nephropathy:hx of ESRD s/p kidney transplant in 12/2019. Urine microalbuminuria negative in 10/2023.  Neuropathy: none per patient.  Foot Exam: no exam today.  Taking aspirin: yes.  Lipids: LDL 64 in 6/2023. Pt taking Lipitor.  Insulin: none.  DM meds: Metformin and Jardiance.  Testing: glucose meter.        ROS  See under HPI.      Allergies  Allergies   Allergen Reactions    Cats Shortness Of Breath and Anaphylaxis    Penicillins Hives    Shrimp Shortness Of Breath and Anaphylaxis    Isosorbide Nitrate      hypotension    Norco [Hydrocodone-Acetaminophen] Nausea and  Vomiting       Medications  Current Outpatient Medications   Medication Sig Dispense Refill    alcohol swab prep pads Use to swab area of injection/davida as directed. 300 each 6    amLODIPine (NORVASC) 5 MG tablet Take 1 tablet (5 mg) by mouth daily 90 tablet 3    aspirin 81 MG EC tablet Take 81 mg by mouth every evening      atorvastatin (LIPITOR) 40 MG tablet Take 1 tablet (40 mg) by mouth every evening 30 tablet 11    biotin 1000 MCG TABS tablet Take 1,000 mcg by mouth every morning Patient takes every other day      blood glucose (ACCU-CHEK GUIDE) test strip Use to test blood sugar 3 times daily or as directed. 300 strip 1    blood glucose (ACCU-CHEK SOFTCLIX) lancing device Lancing device to be used with lancets. Test blood sugar 3 times daily or as directed 1 each 0    blood glucose (NO BRAND SPECIFIED) test strip Use to test blood sugar 3 times daily or as directed. Any covered brand that works with meter. 300 strip 1    blood glucose monitoring (ACCU-CHEK FASTCLIX) lancets USE TO TEST 3 TIMES DAILY OR AS DIRECTED. 300 each 1    blood glucose monitoring (SOFTCLIX) lancets Use to test blood sugar 3 times daily. 300 each 6    Blood Glucose Monitoring Suppl (ACCU-CHEK GUIDE) w/Device KIT USE TO TEST BLOOD SUGAR THREE TIMES DAILY AS DIRECTED.      Calcium Polycarbophil (FIBER) 625 MG tablet Take by mouth every morning      co-enzyme Q-10 30 MG CAPS capsule Take by mouth every morning      empagliflozin (JARDIANCE) 10 MG TABS tablet Take 1 tablet (10 mg) by mouth daily 90 tablet 1    loratadine (CLARITIN) 10 MG tablet Take 10 mg by mouth every evening Patient takes at bedtime      magnesium 250 MG tablet Take 1 tablet by mouth every morning      metFORMIN (GLUCOPHAGE XR) 500 MG 24 hr tablet Take 4 tabs every  tablet 3    Multiple Vitamins-Minerals (HAIR SKIN NAILS PO) Take by mouth every morning      mycophenolate (GENERIC EQUIVALENT) 250 MG capsule Take 2 capsules (500 mg) by mouth 2 times daily 120 capsule  11    OMEPRAZOLE PO Take 20 mg by mouth every morning      sulfamethoxazole-trimethoprim (BACTRIM) 400-80 MG tablet Take 1 tablet by mouth every morning 90 tablet 3    tacrolimus (GENERIC EQUIVALENT) 0.5 MG capsule Take 1 capsule (0.5 mg) by mouth 2 times daily 180 capsule 3    tacrolimus (GENERIC EQUIVALENT) 1 MG capsule ON HOLD due to dose change 90 capsule 3    thin (NO BRAND SPECIFIED) lancets Use with lanceting device 3x daily. Any covered brand that works with lancing device. 300 each 1       Family History  family history includes C.A.D. in his father; Cerebrovascular Disease in his father; Diabetes in his father; Hypertension in his father.    Social History   reports that he quit smoking about 29 years ago. His smoking use included cigarettes. He started smoking about 39 years ago. He has a 20.00 pack-year smoking history. He has never used smokeless tobacco. He reports current alcohol use. He reports that he does not use drugs.     Past Medical History  Past Medical History:   Diagnosis Date    (HFpEF) heart failure with preserved ejection fraction (H)     Allergic rhinitis, cause unspecified     Anemia of chronic kidney failure     Aortic stenosis 08/13/2008    Overview:  Bicuspid aortic valve    AS (aortic stenosis)     Ascending aortic aneurysm (H24)     Bicuspid aortic valve     Bilateral hand pain 06/01/2021    CAD (coronary artery disease)     Congestive heart failure, unspecified     Coronary artery disease involving native artery of transplanted heart without angina pectoris 07/10/2014    Dialysis patient (H24)     Tues-Thur-Sat    Dyslipidemia     Esophageal reflux     ESRD (end stage renal disease) (H)     Hearing problem     Heart replaced by transplant (H) 12/10/2018    Hypersomnia with sleep apnea, unspecified     Hypertension     Ileostomy status (H)     Immunosuppression (H24)     MGUS (monoclonal gammopathy of unknown significance)     Mitral regurgitation     SHEELA (obstructive sleep apnea)      No CPAP    Pneumonia     Sigmoid diverticulitis 08/14/2018    Status post coronary angiogram 06/28/2019    Systolic heart failure (H)     Type 2 diabetes mellitus (H)     Ventral hernia without obstruction or gangrene        Past Surgical History:   Procedure Laterality Date    BIOPSY      CARDIAC SURGERY      COLONOSCOPY N/A 5/3/2018    Procedure: COLONOSCOPY;  colonoscopy ;  Surgeon: Ammon Castillo MD;  [facility]    COLONOSCOPY N/A 5/26/2023    Procedure: COLONOSCOPY, WITH POLYPECTOMY via bx forceps;  Surgeon: Francisco Bland MD;  [facility]    CREATE FISTULA ARTERIOVENOUS UPPER EXTREMITY BOVINE Left 5/8/2019    Procedure: Left Upper Extremity Arteriovenous Bovine Graft Creation;  Surgeon: Calin Cheney MD;  [facility]    CV CORONARY ANGIOGRAM N/A 6/28/2019    Procedure: CV CORONARY ANGIOGRAM;  Surgeon: Montrell Posada MD;  [facility]    CV CORONARY ANGIOGRAM N/A 7/15/2020    Procedure: CV CORONARY ANGIOGRAM;  Surgeon: Marcelo Ramírez MD;  [facility]    CV CORONARY ANGIOGRAM N/A 6/7/2021    Procedure: CV CORONARY ANGIOGRAM;  Surgeon: Gilberto Mccann MD;  [facility]    CV CORONARY ANGIOGRAM N/A 6/13/2022    Procedure: Coronary Angiogram;  Surgeon: Marcelo Ramírez MD;  [facility]    CV HEART BIOPSY N/A 2/1/2019    Procedure: HBX;  Surgeon: Montrell Posada MD;  [facility]    CV HEART BIOPSY N/A 3/22/2019    Procedure: HBX, RIJV ACCESS;  Surgeon: Jordan Fox MD;  [facility]    CV HEART BIOPSY N/A 6/28/2019    Procedure: CV HEART BIOPSY;  Surgeon: Montrell Posada MD;  [facility]    CV HEART BIOPSY N/A 10/28/2019    Procedure: CV HEART BIOPSY;  Surgeon: Marcelo Ramírez MD;  [facility]    CV HEART BIOPSY N/A 2/6/2020    Procedure: CV HEART BIOPSY;   Surgeon: Montrell Posada MD;  Location:  [facility] CARDIAC CATH LAB    CV HEART BIOPSY N/A 7/15/2020    Procedure: CV HEART BIOPSY;  Surgeon: Marcelo aRmírez MD;  Location:  HEART CARDIAC CATH LAB    CV RIGHT HEART CATH MEASUREMENTS RECORDED N/A 6/28/2019    Procedure: CV RIGHT HEART CATH;  Surgeon: Montrell Posada MD;  Location:  [facility] CARDIAC CATH LAB    CV RIGHT HEART CATH MEASUREMENTS RECORDED N/A 7/15/2020    Procedure: CV RIGHT HEART CATH;  Surgeon: Marcelo Ramírez MD;  Location:  HEART CARDIAC CATH LAB    CV RIGHT HEART CATH MEASUREMENTS RECORDED N/A 6/7/2021    Procedure: CV RIGHT HEART CATH;  Surgeon: Gilberto Mccann MD;  Location:  HEART CARDIAC CATH LAB    CV RIGHT HEART CATH MEASUREMENTS RECORDED N/A 6/13/2022    Procedure: Right Heart Catheterization;  Surgeon: Marcelo Ramírez MD;  Location:  HEART CARDIAC CATH LAB    ENDOSCOPIC ULTRASOUND UPPER GASTROINTESTINAL TRACT (GI) N/A 11/8/2021    Procedure: ENDOSCOPIC ULTRASOUND, ESOPHAGOSCOPY / UPPER GASTROINTESTINAL TRACT (GI)  EUS with FNA;  Surgeon: Alon Don MD;  Location[facility]    ESOPHAGOSCOPY, GASTROSCOPY, DUODENOSCOPY (EGD), COMBINED N/A 5/7/2018    Procedure: COMBINED ENDOSCOPIC ULTRASOUND, ESOPHAGOSCOPY, GASTROSCOPY, DUODENOSCOPY (EGD), FINE NEEDLE ASPIRATE/BIOPSY;  Endoscopic Ultrasound with Fine Needle Aspiration ;  Surgeon: Alon Don MD;  [facility]    EXAM UNDER ANESTHESIA RECTUM N/A 8/12/2018    Procedure: EXAM UNDER ANESTHESIA RECTUM;  EXAM UNDER ANESTHESIA RECTUM ,COMBINED INCISION AND DRAINAGE OF RECTAL ABCESS ;  Surgeon: Rick Tran MD;  [facility]    HERNIORRHAPHY VENTRAL N/A 6/24/2022    Procedure: open mesh repair, incisional ventral hernia;  Surgeon: Shaheed Curtis MD;  [facility]    INCISION AND DRAINAGE RECTUM, COMBINED N/A 8/12/2018    Procedure: COMBINED INCISION AND DRAINAGE RECTUM;;  Surgeon: Rick Tran MD;   [facility]    IR DIALYSIS FISTULOGRAM LEFT  9/25/2019    IR DIALYSIS FISTULOGRAM LEFT  11/22/2019    IR DIALYSIS PTA  9/25/2019    IR DIALYSIS PTA  11/22/2019    LAPAROSCOPIC ASSISTED COLOSTOMY TAKEDOWN N/A 12/11/2018    Procedure: Laparoscopic Assisted Colostomy Takedown, Laparoscopic Lysis of Adhesions;  Surgeon: Rick Tran MD;  [facility]    LAPAROSCOPIC ASSISTED SIGMOID COLECTOMY N/A 8/14/2018    Procedure: LAPAROSCOPIC ASSISTED SIGMOID COLECTOMY;  Laparoscopic Hand Assisted Takedown of Splenic Flexure, Sigmoidectomy, Small Bowel Resection, Takedown of Small Bowel to Colon Fistula;  Surgeon: Rick Tran MD;  [facility]    LAPAROSCOPIC HERNIORRHAPHY INGUINAL BILATERAL Bilateral 7/24/2015    Procedure: LAPAROSCOPIC HERNIORRHAPHY INGUINAL BILATERAL;  Surgeon: Bobby Mcconnell MD;  [facility]    LAPAROSCOPIC INSERTION CATHETER PERITONEAL DIALYSIS N/A 6/22/2017    Procedure: LAPAROSCOPIC INSERTION CATHETER PERITONEAL DIALYSIS;  Laparoscopic Peritoneal Dialysis Catheter Placement - Anesthesia with block;  Surgeon: Esteban Arvizu MD;  [facility]    PICC INSERTION Left 04/22/2018    5Fr - 49cm (3cm external), Basilic vein, low SVC    REMOVE CATHETER PERITONEAL Right 1/15/2018    Procedure: REMOVE CATHETER PERITONEAL;  Open Removal of Peritoneal Dialysis Catheter ;  Surgeon: Esteban Arvizu MD;  [facility]    SIGMOIDOSCOPY FLEXIBLE N/A 11/21/2018    Procedure: Examination Under Anesthesia, Flexible Sigmoidoscopy and Polypectomy;  Surgeon: Rick Tran MD;  [facility]    SIGMOIDOSCOPY FLEXIBLE N/A 12/11/2018    Procedure: Flexible Sigmoidoscopy;  Surgeon: Rick Tran MD;  [facility]    TAKEDOWN ILEOSTOMY N/A 3/27/2019    Procedure: Takedown Ileostomy;  Surgeon: Rick Tran MD;  [facility]    TRANSPLANT HEART RECIPIENT N/A 6/14/2018    Procedure: TRANSPLANT HEART RECIPIENT;  Median Sternotomy, on-pump  oxygenator, Heart Transplant;  Surgeon: Rony Caputo MD;  [facility]    TRANSPLANT KIDNEY RECIPIENT LIVING UNRELATED  12/2019       Physical Exam    No exam today.    RESULTS  Creatinine   Date Value Ref Range Status   10/16/2023 1.11 0.67 - 1.17 mg/dL Final   07/06/2021 1.09 0.66 - 1.25 mg/dL Final     GFR Estimate   Date Value Ref Range Status   10/16/2023 72 >60 mL/min/1.73m2 Final   07/06/2021 70 >60 mL/min/[1.73_m2] Final     Comment:     Non  GFR Calc  Starting 12/18/2018, serum creatinine based estimated GFR (eGFR) will be   calculated using the Chronic Kidney Disease Epidemiology Collaboration   (CKD-EPI) equation.       Hemoglobin A1C   Date Value Ref Range Status   09/26/2023 7.0 (H) <5.7 % Final     Comment:     Normal <5.7%   Prediabetes 5.7-6.4%    Diabetes 6.5% or higher     Note: Adopted from ADA consensus guidelines.   06/07/2021 6.2 (H) 0 - 5.6 % Final     Comment:     Normal <5.7% Prediabetes 5.7-6.4%  Diabetes 6.5% or higher - adopted from ADA   consensus guidelines.       Potassium   Date Value Ref Range Status   10/16/2023 4.2 3.4 - 5.3 mmol/L Final   08/09/2022 3.7 3.4 - 5.3 mmol/L Final   07/06/2021 3.9 3.4 - 5.3 mmol/L Final     ALT   Date Value Ref Range Status   06/19/2023 21 0 - 70 U/L Final     Comment:     Reference intervals for this test were updated on 6/12/2023 to more accurately reflect our healthy population. There may be differences in the flagging of prior results with similar values performed with this method. Interpretation of those prior results can be made in the context of the updated reference intervals.     06/07/2021 28 0 - 70 U/L Final     AST   Date Value Ref Range Status   06/19/2023 30 0 - 45 U/L Final     Comment:     Specimen is hemolyzed which can falsely elevate AST. Analysis of a non-hemolyzed specimen may result in a lower value.    Reference intervals for this test were updated on 6/12/2023 to more accurately reflect our healthy  population. There may be differences in the flagging of prior results with similar values performed with this method. Interpretation of those prior results can be made in the context of the updated reference intervals.   06/07/2021 27 0 - 45 U/L Final     TSH   Date Value Ref Range Status   09/07/2021 1.89 0.40 - 4.00 mU/L Final   04/16/2018 2.25 0.40 - 4.00 mU/L Final       Cholesterol   Date Value Ref Range Status   06/19/2023 152 <200 mg/dL Final   01/31/2023 130 <200 mg/dL Final   06/07/2021 120 <200 mg/dL Final   12/09/2020 116 <200 mg/dL Final     HDL Cholesterol   Date Value Ref Range Status   06/07/2021 49 >39 mg/dL Final   12/09/2020 47 >39 mg/dL Final     Direct Measure HDL   Date Value Ref Range Status   06/19/2023 47 >=40 mg/dL Final   01/31/2023 56 >=40 mg/dL Final     LDL Cholesterol Calculated   Date Value Ref Range Status   06/19/2023 64 <=100 mg/dL Final   01/31/2023 48 <=100 mg/dL Final   06/07/2021 28 <100 mg/dL Final     Comment:     Desirable:       <100 mg/dl   12/09/2020 26 <100 mg/dL Final     Comment:     Desirable:       <100 mg/dl     Triglycerides   Date Value Ref Range Status   06/19/2023 205 (H) <150 mg/dL Final   01/31/2023 129 <150 mg/dL Final   06/07/2021 215 (H) <150 mg/dL Final     Comment:     Borderline high:  150-199 mg/dl  High:             200-499 mg/dl  Very high:       >499 mg/dl     12/09/2020 214 (H) <150 mg/dL Final     Comment:     Borderline high:  150-199 mg/dl  High:             200-499 mg/dl  Very high:       >499 mg/dl       Cholesterol/HDL Ratio   Date Value Ref Range Status   08/10/2015 5.0 0.0 - 5.0 Final   04/09/2015 4.0 0.0 - 5.0 Final         ASSESSMENT/PLAN:    1.  TYPE 2 DIABETES MELLITUS: Patient's A1C has improved with addition of Jardiance.  A1C 7.0 % on 9/26/2023.  Reviewed possible side effects of Jardiance including dehydration, UTI and groin yeast infection.  Also reviewed the cardiovascular and renal benefits of using a SGLT-2 drug.  Reminded Murray to  drink plenty of water.  Most recent creat 1.11 with GFR 72 mL/min in Oct 2023.  Continue current dose of Metformin.  Pt is eating healthy and exercising.  I asked pt to check his FBS each am and 2 hr postmeal blood sugar.  Pt seen by Oph in 9/2023 without retinopathy.  No sx of neuropathy and he denies foot ulcers.  No vitals today.    2.  HX OF ESRD: S/P kidney transplant in Dec 2019.  Most recent creat 1.11 with GFR 72 mL/min in 10/2023.    3.  CARDIAC: S/P heart transplant in 6/2018.    4.  LIPIDS: LDL 64 in 6/2023. Pt taking Lipitor.    5.  FOLLOW UP: With me in 6 months or sooner if needed.  Consider graduating from Endocrine Clinic next visit.    Time spent reviewing chart, labs and glucose data today =5  minutes.  Time for video visit today =  15 minutes.  Time for documentation today = 15 minutes.    Total time for visit today = 35 minutes.    Rosa Calvert PA-C

## 2023-11-03 NOTE — NURSING NOTE
Is the patient currently in the state of [place]? YES    Visit mode:VIDEO    If the visit is dropped, the patient can be reconnected by: VIDEO VISIT: Text to cell phone:   Telephone Information:   Mobile [phone]       Will anyone else be joining the visit? NO  (If patient encounters technical issues they should call [phone]    How would you like to obtain your AVS? MyChart    Are changes needed to the allergy or medication list? No    Reason for visit: RECHECK and Video Visit    Kyleigh PRICE

## 2023-11-26 ENCOUNTER — APPOINTMENT (OUTPATIENT)
Dept: ULTRASOUND IMAGING | Facility: CLINIC | Age: 68
End: 2023-11-26
Attending: EMERGENCY MEDICINE
Payer: MEDICARE

## 2023-11-26 ENCOUNTER — HOSPITAL ENCOUNTER (EMERGENCY)
Facility: CLINIC | Age: 68
Discharge: HOME | End: 2023-11-26
Attending: EMERGENCY MEDICINE | Admitting: EMERGENCY MEDICINE
Payer: MEDICARE

## 2023-11-26 ENCOUNTER — OFFICE VISIT (OUTPATIENT)
Dept: URGENT CARE | Facility: URGENT CARE | Age: 68
End: 2023-11-26
Payer: MEDICARE

## 2023-11-26 ENCOUNTER — HOSPITAL ENCOUNTER (EMERGENCY)
Facility: CLINIC | Age: 68
Discharge: HOME | End: 2023-11-26
Payer: MEDICARE

## 2023-11-26 VITALS
HEART RATE: 91 BPM | SYSTOLIC BLOOD PRESSURE: 149 MMHG | BODY MASS INDEX: 26.92 KG/M2 | HEIGHT: 68 IN | TEMPERATURE: 98.1 F | WEIGHT: 177.6 LBS | OXYGEN SATURATION: 99 % | DIASTOLIC BLOOD PRESSURE: 93 MMHG

## 2023-11-26 VITALS
TEMPERATURE: 97.4 F | SYSTOLIC BLOOD PRESSURE: 164 MMHG | DIASTOLIC BLOOD PRESSURE: 103 MMHG | RESPIRATION RATE: 16 BRPM | OXYGEN SATURATION: 99 % | HEART RATE: 101 BPM

## 2023-11-26 DIAGNOSIS — Z94.1 HEART REPLACED BY TRANSPLANT (H): ICD-10-CM

## 2023-11-26 DIAGNOSIS — Z94.0 KIDNEY REPLACED BY TRANSPLANT: ICD-10-CM

## 2023-11-26 DIAGNOSIS — M79.89 PAIN AND SWELLING OF RIGHT LOWER EXTREMITY: Primary | ICD-10-CM

## 2023-11-26 DIAGNOSIS — I82.411 ACUTE DEEP VEIN THROMBOSIS (DVT) OF FEMORAL VEIN OF RIGHT LOWER EXTREMITY (H): ICD-10-CM

## 2023-11-26 DIAGNOSIS — M79.604 PAIN AND SWELLING OF RIGHT LOWER EXTREMITY: Primary | ICD-10-CM

## 2023-11-26 LAB
ALBUMIN SERPL BCG-MCNC: 4.6 G/DL (ref 3.5–5.2)
ALP SERPL-CCNC: 157 U/L (ref 40–150)
ALT SERPL W P-5'-P-CCNC: 18 U/L (ref 0–70)
ANION GAP SERPL CALCULATED.3IONS-SCNC: 15 MMOL/L (ref 7–15)
AST SERPL W P-5'-P-CCNC: 31 U/L (ref 0–45)
BASOPHILS # BLD AUTO: 0.1 10E3/UL (ref 0–0.2)
BASOPHILS NFR BLD AUTO: 1 %
BILIRUB SERPL-MCNC: 1.1 MG/DL
BUN SERPL-MCNC: 11.5 MG/DL (ref 8–23)
CALCIUM SERPL-MCNC: 10.3 MG/DL (ref 8.8–10.2)
CHLORIDE SERPL-SCNC: 102 MMOL/L (ref 98–107)
CREAT SERPL-MCNC: 0.86 MG/DL (ref 0.67–1.17)
DEPRECATED HCO3 PLAS-SCNC: 19 MMOL/L (ref 22–29)
EGFRCR SERPLBLD CKD-EPI 2021: >90 ML/MIN/1.73M2
EOSINOPHIL # BLD AUTO: 0.2 10E3/UL (ref 0–0.7)
EOSINOPHIL NFR BLD AUTO: 3 %
ERYTHROCYTE [DISTWIDTH] IN BLOOD BY AUTOMATED COUNT: 13 % (ref 10–15)
GLUCOSE SERPL-MCNC: 140 MG/DL (ref 70–99)
HCT VFR BLD AUTO: 44.6 % (ref 40–53)
HGB BLD-MCNC: 14.8 G/DL (ref 13.3–17.7)
HOLD SPECIMEN: NORMAL
HOLD SPECIMEN: NORMAL
IMM GRANULOCYTES # BLD: 0 10E3/UL
IMM GRANULOCYTES NFR BLD: 0 %
LYMPHOCYTES # BLD AUTO: 1.5 10E3/UL (ref 0.8–5.3)
LYMPHOCYTES NFR BLD AUTO: 21 %
MAGNESIUM SERPL-MCNC: 1.6 MG/DL (ref 1.7–2.3)
MCH RBC QN AUTO: 29.8 PG (ref 26.5–33)
MCHC RBC AUTO-ENTMCNC: 33.2 G/DL (ref 31.5–36.5)
MCV RBC AUTO: 90 FL (ref 78–100)
MONOCYTES # BLD AUTO: 0.6 10E3/UL (ref 0–1.3)
MONOCYTES NFR BLD AUTO: 8 %
NEUTROPHILS # BLD AUTO: 5 10E3/UL (ref 1.6–8.3)
NEUTROPHILS NFR BLD AUTO: 67 %
NRBC # BLD AUTO: 0 10E3/UL
NRBC BLD AUTO-RTO: 0 /100
NT-PROBNP SERPL-MCNC: 435 PG/ML (ref 0–900)
PLATELET # BLD AUTO: 181 10E3/UL (ref 150–450)
POTASSIUM SERPL-SCNC: 3.8 MMOL/L (ref 3.4–5.3)
PROT SERPL-MCNC: 8.5 G/DL (ref 6.4–8.3)
RADIOLOGIST FLAGS: ABNORMAL
RBC # BLD AUTO: 4.96 10E6/UL (ref 4.4–5.9)
SODIUM SERPL-SCNC: 136 MMOL/L (ref 135–145)
URATE SERPL-MCNC: 4.4 MG/DL (ref 3.4–7)
WBC # BLD AUTO: 7.3 10E3/UL (ref 4–11)

## 2023-11-26 PROCEDURE — 99284 EMERGENCY DEPT VISIT MOD MDM: CPT | Mod: 25 | Performed by: EMERGENCY MEDICINE

## 2023-11-26 PROCEDURE — 83735 ASSAY OF MAGNESIUM: CPT | Performed by: EMERGENCY MEDICINE

## 2023-11-26 PROCEDURE — 250N000013 HC RX MED GY IP 250 OP 250 PS 637: Performed by: EMERGENCY MEDICINE

## 2023-11-26 PROCEDURE — 36415 COLL VENOUS BLD VENIPUNCTURE: CPT | Performed by: EMERGENCY MEDICINE

## 2023-11-26 PROCEDURE — 84550 ASSAY OF BLOOD/URIC ACID: CPT | Performed by: EMERGENCY MEDICINE

## 2023-11-26 PROCEDURE — 99214 OFFICE O/P EST MOD 30 MIN: CPT | Performed by: INTERNAL MEDICINE

## 2023-11-26 PROCEDURE — 80053 COMPREHEN METABOLIC PANEL: CPT | Performed by: EMERGENCY MEDICINE

## 2023-11-26 PROCEDURE — 85025 COMPLETE CBC W/AUTO DIFF WBC: CPT | Performed by: EMERGENCY MEDICINE

## 2023-11-26 PROCEDURE — 93971 EXTREMITY STUDY: CPT | Mod: RT

## 2023-11-26 PROCEDURE — 93971 EXTREMITY STUDY: CPT | Mod: 26 | Performed by: STUDENT IN AN ORGANIZED HEALTH CARE EDUCATION/TRAINING PROGRAM

## 2023-11-26 PROCEDURE — 99284 EMERGENCY DEPT VISIT MOD MDM: CPT | Performed by: EMERGENCY MEDICINE

## 2023-11-26 PROCEDURE — 83880 ASSAY OF NATRIURETIC PEPTIDE: CPT | Mod: GZ | Performed by: EMERGENCY MEDICINE

## 2023-11-26 RX ORDER — APIXABAN 5 MG (74)
KIT ORAL
Qty: 74 EACH | Refills: 0 | Status: SHIPPED | OUTPATIENT
Start: 2023-11-26 | End: 2023-12-26

## 2023-11-26 RX ADMIN — APIXABAN 10 MG: 5 TABLET, FILM COATED ORAL at 15:31

## 2023-11-26 ASSESSMENT — ACTIVITIES OF DAILY LIVING (ADL)
ADLS_ACUITY_SCORE: 35
ADLS_ACUITY_SCORE: 35

## 2023-11-26 NOTE — ED TRIAGE NOTES
Pt arrives ambulatory to triage c/o R calf swelling. Started Friday.  Was seen at  this morning and told to come to ED.  Hx heart transplant 2018 and kidney 2019       Triage Assessment (Adult)       Row Name 11/26/23 1111          Triage Assessment    Airway WDL WDL        Respiratory WDL    Respiratory WDL WDL        Cardiac WDL    Cardiac WDL X;rhythm     Pulse Rate & Regularity tachycardic        Peripheral/Neurovascular WDL    Peripheral Neurovascular WDL WDL        Cognitive/Neuro/Behavioral WDL    Cognitive/Neuro/Behavioral WDL WDL

## 2023-11-26 NOTE — PATIENT INSTRUCTIONS
Concern for blood clot in right lower leg swelling & redness    Referred to ER  Plans to go to [facility] ER

## 2023-11-26 NOTE — PROGRESS NOTES
ASSESSMENT AND PLAN:      ICD-10-CM    1. Pain and swelling of right lower extremity  M79.604     M79.89       2. Heart replaced by transplant (H)  Z94.1       3. Kidney replaced by transplant  Z94.0         High risk complicated medical history.  Would place him at risk for DVT.  Differential would also include sciatica with pain along the back of the leg although this should not cause swelling of calf.  Could also consider Baker's cyst.  No pain posterior fossa or swelling of the knee.  No pain with palpation of knee.  Discussed with patient gout causes pain in the joint and he has no pain over knee ankle or foot.  Discussed potentially he could have a elevated baseline uric acid due to his complicated medical history.  Referred to emergency room.      Patient Instructions   Concern for blood clot in right lower leg swelling & redness    Referred to ER  Plans to go to [facility] ER  No follow-ups on file.        Ирина Nieves MD  [facility]    Subjective     Murray Nicholson is a 68 year old who presents for Patient presents with:  Urgent Care: Pt in clinic to have eval for right leg pain and swelling.    an established patient of [facility].    MS Injury/Pain    Onset of symptoms was few day(s) ago.  Location: right leg -whole leg.  Context:       No injury   Current and Associated symptoms: Pain and Swelling  Pain along the back of the leg  Affecting walk  Strongly feels associated to gout  Aggravating Factors: walking and weight-bearing  Therapies to improve symptoms include: none      2017 had gout  Could hardly walk  Needed prednisone - total leg  Seen in hospital, had CHF, and also ended up with diagnosis of gout.    Denies specific knee pain foot pain toe pain.  States his gout was atypical    Review of Systems      Heart & kidney transplant    Monitor blood pressure at home - does vitals daily.  BP was 150     Past Medical History:   Diagnosis Date    (HFpEF) heart failure with preserved  ejection fraction (H)     Allergic rhinitis, cause unspecified     Anemia of chronic kidney failure     Aortic stenosis 08/13/2008    Overview:  Bicuspid aortic valve    AS (aortic stenosis)     Ascending aortic aneurysm (H24)     Bicuspid aortic valve     Bilateral hand pain 06/01/2021    CAD (coronary artery disease)     Congestive heart failure, unspecified     Coronary artery disease involving native artery of transplanted heart without angina pectoris 07/10/2014    Dialysis patient (H24)     Tues-Thur-Sat    Dyslipidemia     Esophageal reflux     ESRD (end stage renal disease) (H)     Hearing problem     Heart replaced by transplant (H) 12/10/2018    Hypersomnia with sleep apnea, unspecified     Hypertension     Ileostomy status (H)     Immunosuppression (H24)     MGUS (monoclonal gammopathy of unknown significance)     Mitral regurgitation     SHEELA (obstructive sleep apnea)     No CPAP    Pneumonia     Sigmoid diverticulitis 08/14/2018    Status post coronary angiogram 06/28/2019    Systolic heart failure (H)     Type 2 diabetes mellitus (H)     Ventral hernia without obstruction or gangrene      Current Outpatient Medications   Medication Sig Dispense Refill    alcohol swab prep pads Use to swab area of injection/davida as directed. 300 each 6    amLODIPine (NORVASC) 5 MG tablet Take 1 tablet (5 mg) by mouth daily 90 tablet 3    aspirin 81 MG EC tablet Take 81 mg by mouth every evening      atorvastatin (LIPITOR) 40 MG tablet Take 1 tablet (40 mg) by mouth every evening 30 tablet 11    blood glucose (ACCU-CHEK GUIDE) test strip Use to test blood sugar 3 times daily or as directed. 300 strip 1    blood glucose (ACCU-CHEK SOFTCLIX) lancing device Lancing device to be used with lancets. Test blood sugar 3 times daily or as directed 1 each 0    blood glucose (NO BRAND SPECIFIED) test strip Use to test blood sugar 3 times daily or as directed. Any covered brand that works with meter. 300 strip 1    blood glucose  "monitoring (ACCU-CHEK FASTCLIX) lancets USE TO TEST 3 TIMES DAILY OR AS DIRECTED. 300 each 1    blood glucose monitoring (SOFTCLIX) lancets Use to test blood sugar 3 times daily. 300 each 6    Blood Glucose Monitoring Suppl (ACCU-CHEK GUIDE) w/Device KIT USE TO TEST BLOOD SUGAR THREE TIMES DAILY AS DIRECTED.      Calcium Polycarbophil (FIBER) 625 MG tablet Take by mouth every morning      empagliflozin (JARDIANCE) 10 MG TABS tablet Take 1 tablet (10 mg) by mouth daily 90 tablet 1    loratadine (CLARITIN) 10 MG tablet Take 10 mg by mouth every evening Patient takes at bedtime      magnesium 250 MG tablet Take 1 tablet by mouth every morning      metFORMIN (GLUCOPHAGE XR) 500 MG 24 hr tablet Take 4 tabs every  tablet 3    Multiple Vitamins-Minerals (HAIR SKIN NAILS PO) Take by mouth every morning      mycophenolate (GENERIC EQUIVALENT) 250 MG capsule Take 2 capsules (500 mg) by mouth 2 times daily 120 capsule 11    OMEPRAZOLE PO Take 20 mg by mouth every morning      sulfamethoxazole-trimethoprim (BACTRIM) 400-80 MG tablet Take 1 tablet by mouth every morning 90 tablet 3    tacrolimus (GENERIC EQUIVALENT) 0.5 MG capsule Take 1 capsule (0.5 mg) by mouth 2 times daily 180 capsule 3    tacrolimus (GENERIC EQUIVALENT) 1 MG capsule ON HOLD due to dose change 90 capsule 3    thin (NO BRAND SPECIFIED) lancets Use with lanceting device 3x daily. Any covered brand that works with lancing device. 300 each 1           Objective    BP (!) 149/93   Pulse 91   Temp 98.1  F (36.7  C) (Temporal)   Ht 1.727 m (5' 8\")   Wt 80.6 kg (177 lb 9.6 oz)   SpO2 99%   BMI 27.00 kg/m    Physical Exam  Vitals reviewed.   Constitutional:       Appearance: Normal appearance.   Musculoskeletal:      Comments: Slight redness noted throughout right lower extremity with significant swelling discrepancy between left leg.    No pain elicited over right knee with range of motion.  No pain elicited over ankle foot or MCT joints  No pain over " trochanteric area or groin.    No lumbar spine tenderness   Neurological:      Mental Status: He is alert.

## 2023-11-26 NOTE — ED PROVIDER NOTES
ED Provider Note  [facility]      History     Chief Complaint   Patient presents with    Leg Swelling     HPI  Murray Nicholson is a 68 year old male PMH of s/p heart transplant (2018), DM2,MGUS, s/p kidney transplant (2019), HTN who presents to the ER for evaluation of leg swelling.    Patient had an urgent care visit today for pain and swelling in R lower extremity. Told that he could potentially have a elevated baseline uric acid due to his medical history. He was told to present to ER.    Patient has no known history of blood clots.  He denies any recent trauma, chest pain, shortness of breath, fevers, or other complaints.  He is not on blood thinner medications.    Past Medical History  Past Medical History:   Diagnosis Date    (HFpEF) heart failure with preserved ejection fraction (H)     Allergic rhinitis, cause unspecified     Anemia of chronic kidney failure     Aortic stenosis 08/13/2008    Overview:  Bicuspid aortic valve    AS (aortic stenosis)     Ascending aortic aneurysm (H24)     Bicuspid aortic valve     Bilateral hand pain 06/01/2021    CAD (coronary artery disease)     Congestive heart failure, unspecified     Coronary artery disease involving native artery of transplanted heart without angina pectoris 07/10/2014    Dialysis patient (H24)     Tues-Thur-Sat    Dyslipidemia     Esophageal reflux     ESRD (end stage renal disease) (H)     Hearing problem     Heart replaced by transplant (H) 12/10/2018    Hypersomnia with sleep apnea, unspecified     Hypertension     Ileostomy status (H)     Immunosuppression (H24)     MGUS (monoclonal gammopathy of unknown significance)     Mitral regurgitation     SHEELA (obstructive sleep apnea)     No CPAP    Pneumonia     Sigmoid diverticulitis 08/14/2018    Status post coronary angiogram 06/28/2019    Systolic heart failure (H)     Type 2 diabetes mellitus (H)     Ventral hernia without obstruction or gangrene      Past Surgical History:    Procedure Laterality Date    BIOPSY      CARDIAC SURGERY      COLONOSCOPY N/A 5/3/2018    Procedure: COLONOSCOPY;  colonoscopy ;  Surgeon: Ammon Castillo MD;  [facility]: [facility]    COLONOSCOPY N/A 5/26/2023    Procedure: COLONOSCOPY, WITH POLYPECTOMY via bx forceps;  Surgeon: Francisco Bland MD;  Location: [facility]    CREATE FISTULA ARTERIOVENOUS UPPER EXTREMITY BOVINE Left 5/8/2019    Procedure: Left Upper Extremity Arteriovenous Bovine Graft Creation;  Surgeon: Calin Cheney MD;  [facility]    CV CORONARY ANGIOGRAM N/A 6/28/2019    Procedure: CV CORONARY ANGIOGRAM;  Surgeon: Montrell Posada MD;  [facility]    CV CORONARY ANGIOGRAM N/A 7/15/2020    Procedure: CV CORONARY ANGIOGRAM;  Surgeon: Marcelo Ramírez MD;  [facility]    CV CORONARY ANGIOGRAM N/A 6/7/2021    Procedure: CV CORONARY ANGIOGRAM;  Surgeon: Gilberto Mccann MD;  [facility]    CV CORONARY ANGIOGRAM N/A 6/13/2022    Procedure: Coronary Angiogram;  Surgeon: Marcelo Ramírez MD;  [facility]    CV HEART BIOPSY N/A 2/1/2019    Procedure: HBX;  Surgeon: Montrell Posada MD;  [facility]    CV HEART BIOPSY N/A 3/22/2019    Procedure: HBX, RIJV ACCESS;  Surgeon: Jordan Fox MD;  [facility]    CV HEART BIOPSY N/A 6/28/2019    Procedure: CV HEART BIOPSY;  Surgeon: Montrell Posada MD;  [facility]    CV HEART BIOPSY N/A 10/28/2019    Procedure: CV HEART BIOPSY;  Surgeon: Marcelo Ramírez MD;  [facility]    CV HEART BIOPSY N/A 2/6/2020    Procedure: CV HEART BIOPSY;  Surgeon: Montrell Posada MD;  [facility]    CV HEART BIOPSY N/A 7/15/2020    Procedure: CV HEART BIOPSY;  Surgeon: Marcelo Ramírez MD;  [facility]    CV RIGHT HEART CATH MEASUREMENTS RECORDED N/A  6/28/2019    Procedure: CV RIGHT HEART CATH;  Surgeon: Montrell Posada MD;  Location:  HEART CARDIAC CATH LAB    CV RIGHT HEART CATH MEASUREMENTS RECORDED N/A 7/15/2020    Procedure: CV RIGHT HEART CATH;  Surgeon: Marcelo Ramírez MD;  Location:  HEART CARDIAC CATH LAB    CV RIGHT HEART CATH MEASUREMENTS RECORDED N/A 6/7/2021    Procedure: CV RIGHT HEART CATH;  Surgeon: Gilberto Mccann MD;  Location:  HEART CARDIAC CATH LAB    CV RIGHT HEART CATH MEASUREMENTS RECORDED N/A 6/13/2022    Procedure: Right Heart Catheterization;  Surgeon: Marcelo Ramírez MD;  Location:  HEART CARDIAC CATH LAB    ENDOSCOPIC ULTRASOUND UPPER GASTROINTESTINAL TRACT (GI) N/A 11/8/2021    Procedure: ENDOSCOPIC ULTRASOUND, ESOPHAGOSCOPY / UPPER GASTROINTESTINAL TRACT (GI)  EUS with FNA;  Surgeon: Alon Don MD;  Location: [facility]    ESOPHAGOSCOPY, GASTROSCOPY, DUODENOSCOPY (EGD), COMBINED N/A 5/7/2018    Procedure: COMBINED ENDOSCOPIC ULTRASOUND, ESOPHAGOSCOPY, GASTROSCOPY, DUODENOSCOPY (EGD), FINE NEEDLE ASPIRATE/BIOPSY;  Endoscopic Ultrasound with Fine Needle Aspiration ;  Surgeon: Alon Don MD;  [facility]    EXAM UNDER ANESTHESIA RECTUM N/A 8/12/2018    Procedure: EXAM UNDER ANESTHESIA RECTUM;  EXAM UNDER ANESTHESIA RECTUM ,COMBINED INCISION AND DRAINAGE OF RECTAL ABCESS ;  Surgeon: Rick Tran MD;  [facility]    HERNIORRHAPHY VENTRAL N/A 6/24/2022    Procedure: open mesh repair, incisional ventral hernia;  Surgeon: Shaheed Curtis MD;  [facility]    INCISION AND DRAINAGE RECTUM, COMBINED N/A 8/12/2018    Procedure: COMBINED INCISION AND DRAINAGE RECTUM;;  Surgeon: Rick Tran MD;  [facility]    IR DIALYSIS FISTULOGRAM LEFT  9/25/2019    IR DIALYSIS FISTULOGRAM LEFT  11/22/2019    IR DIALYSIS PTA  9/25/2019    IR DIALYSIS PTA  11/22/2019    LAPAROSCOPIC ASSISTED COLOSTOMY TAKEDOWN N/A 12/11/2018    Procedure: Laparoscopic Assisted  Colostomy Takedown, Laparoscopic Lysis of Adhesions;  Surgeon: Rick Tran MD;  [facility]    LAPAROSCOPIC ASSISTED SIGMOID COLECTOMY N/A 8/14/2018    Procedure: LAPAROSCOPIC ASSISTED SIGMOID COLECTOMY;  Laparoscopic Hand Assisted Takedown of Splenic Flexure, Sigmoidectomy, Small Bowel Resection, Takedown of Small Bowel to Colon Fistula;  Surgeon: Rick Tran MD;  [facility]    LAPAROSCOPIC HERNIORRHAPHY INGUINAL BILATERAL Bilateral 7/24/2015    Procedure: LAPAROSCOPIC HERNIORRHAPHY INGUINAL BILATERAL;  Surgeon: Bobby Mcconnell MD;  [facility]    LAPAROSCOPIC INSERTION CATHETER PERITONEAL DIALYSIS N/A 6/22/2017    Procedure: LAPAROSCOPIC INSERTION CATHETER PERITONEAL DIALYSIS;  Laparoscopic Peritoneal Dialysis Catheter Placement - Anesthesia with block;  Surgeon: Esteban Arvizu MD;  [facility]    PICC INSERTION Left 04/22/2018    5Fr - 49cm (3cm external), Basilic vein, low SVC    REMOVE CATHETER PERITONEAL Right 1/15/2018    Procedure: REMOVE CATHETER PERITONEAL;  Open Removal of Peritoneal Dialysis Catheter ;  Surgeon: Esteban Arvizu MD;  [facility]    SIGMOIDOSCOPY FLEXIBLE N/A 11/21/2018    Procedure: Examination Under Anesthesia, Flexible Sigmoidoscopy and Polypectomy;  Surgeon: Rick Tran MD;  [facility]    SIGMOIDOSCOPY FLEXIBLE N/A 12/11/2018    Procedure: Flexible Sigmoidoscopy;  Surgeon: Rick Tran MD;  [facility]    TAKEDOWN ILEOSTOMY N/A 3/27/2019    Procedure: Takedown Ileostomy;  Surgeon: Rick Tran MD;  [facility]    TRANSPLANT HEART RECIPIENT N/A 6/14/2018    Procedure: TRANSPLANT HEART RECIPIENT;  Median Sternotomy, on-pump oxygenator, Heart Transplant;  Surgeon: Rony Caputo MD;  [facility]    TRANSPLANT KIDNEY RECIPIENT LIVING UNRELATED  12/2019     Apixaban Starter Pack (ELIQUIS DVT/PE STARTER PACK) 5 MG TBPK  alcohol swab prep pads  amLODIPine (NORVASC) 5 MG  tablet  aspirin 81 MG EC tablet  atorvastatin (LIPITOR) 40 MG tablet  blood glucose (ACCU-CHEK GUIDE) test strip  blood glucose (ACCU-CHEK SOFTCLIX) lancing device  blood glucose (NO BRAND SPECIFIED) test strip  blood glucose monitoring (ACCU-CHEK FASTCLIX) lancets  blood glucose monitoring (SOFTCLIX) lancets  Blood Glucose Monitoring Suppl (ACCU-CHEK GUIDE) w/Device KIT  Calcium Polycarbophil (FIBER) 625 MG tablet  empagliflozin (JARDIANCE) 10 MG TABS tablet  loratadine (CLARITIN) 10 MG tablet  magnesium oxide (MAG-OX) 400 MG tablet  metFORMIN (GLUCOPHAGE XR) 500 MG 24 hr tablet  Multiple Vitamins-Minerals (HAIR SKIN NAILS PO)  mycophenolate (GENERIC EQUIVALENT) 250 MG capsule  OMEPRAZOLE PO  sulfamethoxazole-trimethoprim (BACTRIM) 400-80 MG tablet  tacrolimus (GENERIC EQUIVALENT) 0.5 MG capsule  tacrolimus (GENERIC EQUIVALENT) 1 MG capsule  thin (NO BRAND SPECIFIED) lancets      Allergies   Allergen Reactions    Cats Shortness Of Breath and Anaphylaxis    Penicillins Hives    Shrimp Shortness Of Breath and Anaphylaxis    Isosorbide Nitrate      hypotension    Norco [Hydrocodone-Acetaminophen] Nausea and Vomiting     Family History  Family History   Problem Relation Age of Onset    C.A.D. Father          from-never knew father-age 60    Diabetes Father     Cerebrovascular Disease Father     Hypertension Father     Breast Cancer No family hx of     Cancer - colorectal No family hx of     Prostate Cancer No family hx of     Kidney Disease No family hx of     Melanoma No family hx of     Skin Cancer No family hx of      Social History   Social History     Tobacco Use    Smoking status: Former     Packs/day: 1.00     Years: 20.00     Additional pack years: 0.00     Total pack years: 20.00     Types: Cigarettes     Start date: 1984     Quit date: 1994     Years since quittin.2    Smokeless tobacco: Never   Vaping Use    Vaping Use: Never used   Substance Use Topics    Alcohol use: Yes     Comment:  Occasionally    Drug use: No         A medically appropriate review of systems was performed with pertinent positives and negatives noted in the HPI, and all other systems negative.    Physical Exam   BP: (!) 164/103  Pulse: 101  Temp: 97.4  F (36.3  C)  Resp: 16  SpO2: 99 %  Physical Exam  Vitals reviewed.   Constitutional:       General: He is not in acute distress.     Appearance: Normal appearance. He is not ill-appearing.   HENT:      Head: Normocephalic and atraumatic.      Mouth/Throat:      Mouth: Mucous membranes are moist.      Pharynx: Oropharynx is clear.   Eyes:      Extraocular Movements: Extraocular movements intact.      Pupils: Pupils are equal, round, and reactive to light.   Cardiovascular:      Rate and Rhythm: Normal rate and regular rhythm.   Pulmonary:      Effort: Pulmonary effort is normal.      Breath sounds: Normal breath sounds.   Abdominal:      Palpations: Abdomen is soft.      Tenderness: There is no abdominal tenderness.   Musculoskeletal:         General: Swelling present. Normal range of motion.      Cervical back: Normal range of motion and neck supple.      Right lower leg: Edema present.      Left lower leg: No edema.      Comments: RLE swollen below knee, left not swollen, edematous not pitting   Skin:     General: Skin is warm and dry.   Neurological:      General: No focal deficit present.      Mental Status: He is alert and oriented to person, place, and time.   Psychiatric:         Mood and Affect: Mood normal.         Behavior: Behavior normal.           ED Course, Procedures, & Data      Procedures                     Results for orders placed or performed during the hospital encounter of 11/26/23   US Lower Extremity Venous Duplex Right     Status: Abnormal   Result Value Ref Range    Radiologist flags Occlusive DVT (Urgent)     Narrative    EXAMINATION: DOPPLER VENOUS ULTRASOUND OF THE RIGHT LOWER EXTREMITY,  11/26/2023 2:34 PM     COMPARISON: 5/2/2018.    HISTORY:  Asymmetric leg swelling right greater than left, concerning  for DVT.    TECHNIQUE:  Gray-scale evaluation with compression, spectral flow, and  color Doppler assessment of the deep venous system of the right leg  from groin to knee, and then at the ankle.    FINDINGS:  In the right lower extremity, there is occlusive DVT from the right  femoral vein through the popliteal vein and extending into the  peroneal and gastrocnemius veins. The right common femoral and  posterior tibial veins are patent.      Impression    IMPRESSION:  Occlusive DVT from the right femoral vein to the popliteal vein and  extending into the peroneal and gastrocnemius veins.    [Urgent Result: Occlusive DVT]    Finding was identified on 11/26/2023 2:33 PM.     Dr. Clemons was contacted by Dr. Prince at 11/26/2023 2:35 PM and  verbalized understanding of the urgent finding.     I have personally reviewed the examination and initial interpretation  and I agree with the findings.    MILES OSBORN DO         SYSTEM ID:  [id number]   Comprehensive metabolic panel     Status: Abnormal   Result Value Ref Range    Sodium 136 135 - 145 mmol/L    Potassium 3.8 3.4 - 5.3 mmol/L    Carbon Dioxide (CO2) 19 (L) 22 - 29 mmol/L    Anion Gap 15 7 - 15 mmol/L    Urea Nitrogen 11.5 8.0 - 23.0 mg/dL    Creatinine 0.86 0.67 - 1.17 mg/dL    GFR Estimate >90 >60 mL/min/1.73m2    Calcium 10.3 (H) 8.8 - 10.2 mg/dL    Chloride 102 98 - 107 mmol/L    Glucose 140 (H) 70 - 99 mg/dL    Alkaline Phosphatase 157 (H) 40 - 150 U/L    AST 31 0 - 45 U/L    ALT 18 0 - 70 U/L    Protein Total 8.5 (H) 6.4 - 8.3 g/dL    Albumin 4.6 3.5 - 5.2 g/dL    Bilirubin Total 1.1 <=1.2 mg/dL   BNP     Status: Normal   Result Value Ref Range    N terminal Pro BNP Inpatient 435 0 - 900 pg/mL   Uric acid     Status: Normal   Result Value Ref Range    Uric Acid 4.4 3.4 - 7.0 mg/dL   Magnesium     Status: Abnormal   Result Value Ref Range    Magnesium 1.6 (L) 1.7 - 2.3 mg/dL   [place] Draw      Status: None    Narrative    The following orders were created for panel order [place] [facility].  Procedure                               Abnormality         Status                     ---------                               -----------         ------                     Extra Blue Top Tube[[id number]]                              Final result               Extra Red Top Tube[[id number]]                               Final result                 Please view results for these tests on the individual orders.   CBC with platelets and differential     Status: None   Result Value Ref Range    WBC Count 7.3 4.0 - 11.0 10e3/uL    RBC Count 4.96 4.40 - 5.90 10e6/uL    Hemoglobin 14.8 13.3 - 17.7 g/dL    Hematocrit 44.6 40.0 - 53.0 %    MCV 90 78 - 100 fL    MCH 29.8 26.5 - 33.0 pg    MCHC 33.2 31.5 - 36.5 g/dL    RDW 13.0 10.0 - 15.0 %    Platelet Count 181 150 - 450 10e3/uL    % Neutrophils 67 %    % Lymphocytes 21 %    % Monocytes 8 %    % Eosinophils 3 %    % Basophils 1 %    % Immature Granulocytes 0 %    NRBCs per 100 WBC 0 <1 /100    Absolute Neutrophils 5.0 1.6 - 8.3 10e3/uL    Absolute Lymphocytes 1.5 0.8 - 5.3 10e3/uL    Absolute Monocytes 0.6 0.0 - 1.3 10e3/uL    Absolute Eosinophils 0.2 0.0 - 0.7 10e3/uL    Absolute Basophils 0.1 0.0 - 0.2 10e3/uL    Absolute Immature Granulocytes 0.0 <=0.4 10e3/uL    Absolute NRBCs 0.0 10e3/uL   Extra Blue Top Tube     Status: None   Result Value Ref Range    Hold Specimen JIC    Extra Red Top Tube     Status: None   Result Value Ref Range    Hold Specimen JIC    CBC with Platelets & Differential     Status: None    Narrative    The following orders were created for panel order CBC with Platelets & Differential.  Procedure                               Abnormality         Status                     ---------                               -----------         ------                     CBC with platelets and d...[[id number]]                      Final result                 Please view  results for these tests on the individual orders.     Medications   apixaban ANTICOAGULANT (ELIQUIS) tablet 10 mg (10 mg Oral $Given 11/26/23 1534)     Labs Ordered and Resulted from Time of ED Arrival to Time of ED Departure   COMPREHENSIVE METABOLIC PANEL - Abnormal       Result Value    Sodium 136      Potassium 3.8      Carbon Dioxide (CO2) 19 (*)     Anion Gap 15      Urea Nitrogen 11.5      Creatinine 0.86      GFR Estimate >90      Calcium 10.3 (*)     Chloride 102      Glucose 140 (*)     Alkaline Phosphatase 157 (*)     AST 31      ALT 18      Protein Total 8.5 (*)     Albumin 4.6      Bilirubin Total 1.1     MAGNESIUM - Abnormal    Magnesium 1.6 (*)    NT PROBNP INPATIENT - Normal    N terminal Pro BNP Inpatient 435     URIC ACID - Normal    Uric Acid 4.4     CBC WITH PLATELETS AND DIFFERENTIAL    WBC Count 7.3      RBC Count 4.96      Hemoglobin 14.8      Hematocrit 44.6      MCV 90      MCH 29.8      MCHC 33.2      RDW 13.0      Platelet Count 181      % Neutrophils 67      % Lymphocytes 21      % Monocytes 8      % Eosinophils 3      % Basophils 1      % Immature Granulocytes 0      NRBCs per 100 WBC 0      Absolute Neutrophils 5.0      Absolute Lymphocytes 1.5      Absolute Monocytes 0.6      Absolute Eosinophils 0.2      Absolute Basophils 0.1      Absolute Immature Granulocytes 0.0      Absolute NRBCs 0.0       US Lower Extremity Venous Duplex Right   Final Result   Abnormal   IMPRESSION:   Occlusive DVT from the right femoral vein to the popliteal vein and   extending into the peroneal and gastrocnemius veins.      [Urgent Result: Occlusive DVT]      Finding was identified on 11/26/2023 2:33 PM.       Dr. Clemons was contacted by Dr. Prince at 11/26/2023 2:35 PM and   verbalized understanding of the urgent finding.       I have personally reviewed the examination and initial interpretation   and I agree with the findings.      MILES OSBORN DO            SYSTEM ID:  [id number]             Critical care  was not performed.     Medical Decision Making  The patient's presentation was of moderate complexity (an undiagnosed new problem with uncertain diagnosis).    The patient's evaluation involved:  review of external note(s) from 1 sources (see separate area of note for details)  ordering and/or review of 1 test(s) in this encounter (see separate area of note for details)    The patient's management necessitated moderate risk (prescription drug management including medications given in the ED).    Assessment & Plan    Murray Nicholson is a 68 year old male PMH of s/p heart transplant (2018), DM2,MGUS, s/p kidney transplant (2019), HTN who presents to the ER for evaluation of leg swelling.    Patient is nontoxic-appearing on arrival, tachycardic in triage but has normal rate but on my evaluation without intervention.  Regular rhythm.  Other vital signs within normal limits and patient is afebrile.  On exam he has swelling of the right lower extremity below the knee with normal on swollen left lower extremity by comparison.  He has intact range of motion at the ankle bilaterally.  Vascular ultrasound obtained to assess for possibility of DVT and this came back positive for DVT on the right side.  Patient was started on Eliquis.  He was given first dose department and then given a prescription.  He was encouraged to follow-up outpatient and we discussed return precautions. He has no chest pain or shortness of breath at this time; will not pursue PE workup but did discuss strict return precautions.     I have reviewed the nursing notes. I have reviewed the findings, diagnosis, plan and need for follow up with the patient.    Discharge Medication List as of 11/26/2023  3:50 PM        START taking these medications    Details   Apixaban Starter Pack (ELIQUIS DVT/PE STARTER PACK) 5 MG TBPK Take 10 mg by mouth 2 times daily for 7 days, THEN 5 mg 2 times daily for 23 days., Disp-74 each, R-0, Local Print             Final  diagnoses:   Acute deep vein thrombosis (DVT) of femoral vein of right lower extremity (H)       Navid Clemons MD  [facility] EMERGENCY DEPARTMENT  11/26/2023     Navid Clemons MD  11/27/23 [id number]

## 2023-11-27 ENCOUNTER — TELEPHONE (OUTPATIENT)
Dept: ENDOCRINOLOGY | Facility: CLINIC | Age: 68
End: 2023-11-27
Payer: MEDICARE

## 2023-11-27 ENCOUNTER — VIRTUAL VISIT (OUTPATIENT)
Dept: TRANSPLANT | Facility: CLINIC | Age: 68
End: 2023-11-27
Attending: INTERNAL MEDICINE
Payer: MEDICARE

## 2023-11-27 VITALS
SYSTOLIC BLOOD PRESSURE: 98 MMHG | HEART RATE: 98 BPM | DIASTOLIC BLOOD PRESSURE: 79 MMHG | WEIGHT: 173.8 LBS | BODY MASS INDEX: 26.43 KG/M2

## 2023-11-27 DIAGNOSIS — N18.2 ANEMIA IN STAGE 2 CHRONIC KIDNEY DISEASE: ICD-10-CM

## 2023-11-27 DIAGNOSIS — D84.9 IMMUNOSUPPRESSED STATUS (H): ICD-10-CM

## 2023-11-27 DIAGNOSIS — Z94.0 KIDNEY REPLACED BY TRANSPLANT: ICD-10-CM

## 2023-11-27 DIAGNOSIS — Z48.298 AFTERCARE FOLLOWING ORGAN TRANSPLANT: ICD-10-CM

## 2023-11-27 DIAGNOSIS — Z29.89 NEED FOR PNEUMOCYSTIS PROPHYLAXIS: ICD-10-CM

## 2023-11-27 DIAGNOSIS — I15.1 HTN, KIDNEY TRANSPLANT RELATED: ICD-10-CM

## 2023-11-27 DIAGNOSIS — E83.52 HYPERCALCEMIA: ICD-10-CM

## 2023-11-27 DIAGNOSIS — D63.1 ANEMIA IN STAGE 2 CHRONIC KIDNEY DISEASE: ICD-10-CM

## 2023-11-27 DIAGNOSIS — E83.42 HYPOMAGNESEMIA: Primary | ICD-10-CM

## 2023-11-27 DIAGNOSIS — I82.4Z1 ACUTE DEEP VEIN THROMBOSIS (DVT) OF DISTAL VEIN OF RIGHT LOWER EXTREMITY (H): ICD-10-CM

## 2023-11-27 DIAGNOSIS — D47.2 MGUS (MONOCLONAL GAMMOPATHY OF UNKNOWN SIGNIFICANCE): ICD-10-CM

## 2023-11-27 DIAGNOSIS — Z94.0 HTN, KIDNEY TRANSPLANT RELATED: ICD-10-CM

## 2023-11-27 DIAGNOSIS — N18.2 CKD (CHRONIC KIDNEY DISEASE) STAGE 2, GFR 60-89 ML/MIN: ICD-10-CM

## 2023-11-27 DIAGNOSIS — Z94.1 HEART REPLACED BY TRANSPLANT (H): ICD-10-CM

## 2023-11-27 PROCEDURE — 99214 OFFICE O/P EST MOD 30 MIN: CPT | Mod: VID | Performed by: INTERNAL MEDICINE

## 2023-11-27 RX ORDER — MAGNESIUM OXIDE 400 MG/1
400 TABLET ORAL 2 TIMES DAILY
Qty: 180 TABLET | Refills: 3 | Status: SHIPPED | OUTPATIENT
Start: 2023-11-27

## 2023-11-27 ASSESSMENT — PAIN SCALES - GENERAL: PAINLEVEL: MILD PAIN (3)

## 2023-11-27 ASSESSMENT — PATIENT HEALTH QUESTIONNAIRE - PHQ9: SUM OF ALL RESPONSES TO PHQ QUESTIONS 1-9: 1

## 2023-11-27 NOTE — LETTER
11/27/2023         RE: Murray Nicholson  [address]  [place] [address]        Dear Colleague,    Thank you for referring your patient, Murray Nicholson, to the [facility]. Please see a copy of my visit note below.    Virtual Visit Details    Type of service:  Video Visit   Video Start Time:  1533  Video End Time: 1554    Originating Location (pt. Location): Home  Distant Location (provider location):  Off-site  Platform used for Video Visit: [facility]      TRANSPLANT NEPHROLOGY CHRONIC POST TRANSPLANT VISIT    Assessment & Plan  # LDKT: Stable   - Baseline Creatinine:  ~ 1.0-1.2   - Proteinuria: Normal (<0.2 grams)   - Date DSA Last Checked: Jun/2023      Latest DSA: No cPRA: 0%   - BK Viremia: No   - Kidney Tx Biopsy: No    # Heart Tx: Patient appears to be stable.  Followed by Cardiology.    # Immunosuppression: Tacrolimus immediate release (goal 4-6) and Mycophenolate mofetil (dose 750 mg every 12 hours)   - Continue with intensive monitoring of immunosuppression for efficacy and toxicity.   - Changes: No    # Infection Prophylaxis:   - PJP: Sulfa/TMP (Bactrim)    # Hypertension: Controlled;  Goal BP: < 130/80   - Changes: No    # Diabetes: Borderline control (HbA1c 7-9%) Last HbA1c: 7.0%   - Management as per primary care.    # Anemia in Chronic Renal Disease: Hgb: Stable, near normal      ANASTASIYA: No   - Iron studies: Not checked recently    # Mineral Bone Disorder:   - Vitamin D; level: Normal        On supplement: No  - Calcium; level: High normal        On supplement: No    # Electrolytes:   - Potassium; level: Normal        On supplement: No  - Magnesium; level: Stable low        On supplement: Yes; Will increase magnesium oxide to 400 mg bid.  - Bicarbonate; level: Normal        On supplement: No    # PAD: No claudication symptoms.    # H/o CVA: No residual deficits.    # MGUS: Stable monoclonal IgG immunoglobulin of kappa light chain type with stable M-spike of ~ 0.3  with last check Mar/2023.   - Will recheck SPEP yearly, next due 3/2024.    # GERD: Asymptomatic on PPI.    # IPMN/Pancreatic Cyst: Likely side branch IPMN with negative biopsy with severe pancreas atrophy and waxing and waning of cyst size as noted on last MRI/MRCP May/2023.  Followed by GI.    # Pulmonary Nodules: New groundglass nodules on CT chest Aug/2023.  Followed by Pulmonary with plans for repeat CT in 6 months (Feb/2024).    # RLE DVT: New diagnosis 11/2023.  Likely provoked following long plane trip and also care trip prior.  On anticoagulation with apixaban.    # Skin Cancer Risk:    - Discussed sun protection and recommend regular follow up with Dermatology.    # Medical Compliance: Yes    # COVID-19 Virus Review: Discussed COVID-19 virus and the potential medical risks.  Reviewed preventative health recommendations, including wearing a mask where appropriate.  Recommended COVID vaccination should be up to date with either an initial vaccination or booster shot when appropriate.  Asked the patient to inform the transplant center if they are exposed or diagnosed with this virus.    # COVID Vaccination Up To Date: Yes    # Transplant History:  Etiology of Kidney Failure: Diabetes mellitus type 2  Tx: LDKT and Heart Tx  Transplant: 12/19/2019 (Kidney), 6/14/2018 (Heart)  Significant changes in immunosuppression: None  Significant transplant-related complications: None    Transplant Office Phone Number: [phone]    Assessment and plan was discussed with the patient and he voiced his understanding and agreement.    Return visit: Return in about 1 year (around 11/27/2024).    Giovany Quijano MD    Chief Complaint  Mr. Nicholson is a 68 year old here for kidney transplant and immunosuppression management.    History of Present Illness   Mr. Nicholson reports feeling good overall with some medical complaints.  Patient developed right lower leg swelling a couple of weeks ago and went to the ER.  He was  found to have a DVT and started on apixaban for planned 3 months.  Patient has no previous history of DVT, but he did travel to [place] via plane in September, as well as driving 5 hours in a car during that trip.    His energy level is good and remains normal.  He is active and does get some exercise.  Denies any chest pain, but slight shortness of breath with exertion, which is a bit worse over the last month or so.  Some swelling on the right leg, where he has his blood clot, but none on the left.    Appetite is okay and remains normal.  His weight is down ~ 10 lbs.  No nausea, vomiting or diarrhea.  Heartburn symptoms are controlled on omeprazole.  No fever, sweats or chills.  No night sweats.    Home BP:  110-120/70-80s  with no lightheadedness.    Problem List  Patient Active Problem List   Diagnosis    GERD (gastroesophageal reflux disease)    Allergic rhinitis    Anemia in chronic renal disease    Dyslipidemia    MGUS (monoclonal gammopathy of unknown significance)    Ascending aortic aneurysm (H24)    Heart replaced by transplant (H)    Immunosuppressed status (H24)    IPMN (intraductal papillary mucinous neoplasm)    Kidney replaced by transplant    Aftercare following organ transplant    HTN, kidney transplant related    Secondary renal hyperparathyroidism (H24)    Vitamin D deficiency    Need for pneumocystis prophylaxis    Erectile dysfunction, unspecified erectile dysfunction type    Nocturia    History of hernia repair    Decreased hearing, unspecified laterality    Overactive bladder    Diabetes mellitus, type 2 (H)    Hypercalcemia    CKD (chronic kidney disease) stage 2, GFR 60-89 ml/min    Acute deep vein thrombosis (DVT) of distal vein of right lower extremity (H)       Allergies  Allergies   Allergen Reactions    Cats Shortness Of Breath and Anaphylaxis    Penicillins Hives    Shrimp Shortness Of Breath and Anaphylaxis    Isosorbide Nitrate      hypotension    Norco [Hydrocodone-Acetaminophen]  Nausea and Vomiting       Medications  Current Outpatient Medications   Medication Sig    magnesium oxide (MAG-OX) 400 MG tablet Take 1 tablet (400 mg) by mouth 2 times daily    alcohol swab prep pads Use to swab area of injection/davida as directed.    amLODIPine (NORVASC) 5 MG tablet Take 1 tablet (5 mg) by mouth daily    apixaban ANTICOAGULANT (ELIQUIS) 5 MG tablet Take 1 tablet (5 mg) by mouth 2 times daily    Apixaban Starter Pack (ELIQUIS DVT/PE STARTER PACK) 5 MG TBPK Take 10 mg by mouth 2 times daily for 7 days, THEN 5 mg 2 times daily for 23 days.    aspirin 81 MG EC tablet Take 81 mg by mouth every evening    atorvastatin (LIPITOR) 40 MG tablet Take 1 tablet (40 mg) by mouth every evening    blood glucose (ACCU-CHEK GUIDE) test strip Use to test blood sugar 3 times daily or as directed.    blood glucose (ACCU-CHEK SOFTCLIX) lancing device Lancing device to be used with lancets. Test blood sugar 3 times daily or as directed    blood glucose (NO BRAND SPECIFIED) test strip Use to test blood sugar 3 times daily or as directed. Any covered brand that works with meter.    blood glucose monitoring (ACCU-CHEK FASTCLIX) lancets USE TO TEST 3 TIMES DAILY OR AS DIRECTED.    blood glucose monitoring (SOFTCLIX) lancets Use to test blood sugar 3 times daily.    Blood Glucose Monitoring Suppl (ACCU-CHEK GUIDE) w/Device KIT USE TO TEST BLOOD SUGAR THREE TIMES DAILY AS DIRECTED.    Calcium Polycarbophil (FIBER) 625 MG tablet Take by mouth every morning    empagliflozin (JARDIANCE) 10 MG TABS tablet Take 1 tablet (10 mg) by mouth daily    loratadine (CLARITIN) 10 MG tablet Take 10 mg by mouth every evening Patient takes at bedtime    metFORMIN (GLUCOPHAGE XR) 500 MG 24 hr tablet Take 4 tabs every AM    Multiple Vitamins-Minerals (HAIR SKIN NAILS PO) Take by mouth every morning    mycophenolate (GENERIC EQUIVALENT) 250 MG capsule Take 2 capsules (500 mg) by mouth 2 times daily    OMEPRAZOLE PO Take 20 mg by mouth every  morning    sulfamethoxazole-trimethoprim (BACTRIM) 400-80 MG tablet Take 1 tablet by mouth every morning    tacrolimus (GENERIC EQUIVALENT) 0.5 MG capsule Take 1 capsule (0.5 mg) by mouth 2 times daily    tacrolimus (GENERIC EQUIVALENT) 1 MG capsule ON HOLD due to dose change    thin (NO BRAND SPECIFIED) lancets Use with lanceting device 3x daily. Any covered brand that works with lancing device.     Current Facility-Administered Medications   Medication    cilgavimab 300 mg/tixagevimab 300 mg (EVUSHELD) - FULL DOSE     Facility-Administered Medications Ordered in Other Visits   Medication    diatrizoate meglumine-sodium (GASTROGRAFIN/GASTROVIEW) 66-10 % solution 480 mL     Medications Discontinued During This Encounter   Medication Reason    magnesium 250 MG tablet        Physical Exam  Vital Signs: BP 98/79   Pulse 98   Wt 78.8 kg (173 lb 12.8 oz)   BMI 26.43 kg/m      GENERAL APPEARANCE: alert and no distress  HENT: no obvious abnormalities on appearance  RESP: breathing appears unremarkable with normal rate, no audible wheezing or cough and no apparent shortness of breath with conversation  MS: extremities normal - no gross deformities noted  SKIN: no apparent rash and normal skin tone  NEURO: speech is clear with no obvious neurological deficits  PSYCH: mentation appears normal and affect normal    Data        Latest Ref Rng & Units 11/26/2023    12:33 PM 10/16/2023    10:19 AM 9/26/2023     9:54 AM   Renal   Sodium 135 - 145 mmol/L 136  138  138    K 3.4 - 5.3 mmol/L 3.8  4.2  4.0    Cl 98 - 107 mmol/L 102  103  102    Cl (external) 98 - 107 mmol/L 102  103  102    CO2 22 - 29 mmol/L 19  25  25    Urea Nitrogen 8.0 - 23.0 mg/dL 11.5  13.7  14.3    Creatinine 0.67 - 1.17 mg/dL 0.86  1.11  0.94    Glucose 70 - 99 mg/dL 140  193  145    Calcium 8.8 - 10.2 mg/dL 10.3  10.0  11.5    Magnesium 1.7 - 2.3 mg/dL 1.6            Latest Ref Rng & Units 7/20/2023     9:18 AM 6/19/2023     9:39 AM 3/14/2023     9:53 AM    Bone Health   Phosphorus 2.5 - 4.5 mg/dL 3.4  3.1     Vit D Def 20 - 75 ug/L   60          Latest Ref Rng & Units 11/26/2023    12:33 PM 10/16/2023    10:19 AM 9/26/2023     9:54 AM   Heme   WBC 4.0 - 11.0 10e3/uL 7.3  5.2  5.7    Hgb 13.3 - 17.7 g/dL 14.8  13.3  14.2    Plt 150 - 450 10e3/uL 181  210  237          Latest Ref Rng & Units 11/26/2023    12:33 PM 6/19/2023     9:39 AM 1/31/2023    10:09 AM   Liver   AP 40 - 150 U/L 157  147  129    TBili <=1.2 mg/dL 1.1  0.9  0.8    ALT 0 - 70 U/L 18  21  22    AST 0 - 45 U/L 31  30  27    Tot Protein 6.4 - 8.3 g/dL 8.5  7.4  7.4    Albumin 3.5 - 5.2 g/dL 4.6  4.4  4.4          Latest Ref Rng & Units 9/26/2023     9:54 AM 6/19/2023     9:39 AM 1/31/2023    10:09 AM   Pancreas   A1C <5.7 % 7.0  7.3  6.9          Latest Ref Rng & Units 12/10/2019    11:21 AM 6/10/2019     1:15 PM 7/10/2018     7:11 AM   Iron studies   Iron 35 - 180 ug/dL 84  118  66    Iron Saturation Index 15 - 46 % 35  47  28    Ferritin 26 - 388 ng/mL 445  644  771          Latest Ref Rng & Units 6/19/2023     9:39 AM 6/13/2022     7:57 AM 7/6/2021    10:03 AM   [facility] Txp Virology   CMV QUANT IU/ML Not Detected IU/mL Not Detected  Not Detected     BK Spec    Plasma    BK Res BKNEG^BK Virus DNA Not Detected copies/mL   BK Virus DNA Not Detected    BK Log <2.7 Log copies/mL   Not Calculated    EBV DNA COPIES/ML Not Detected copies/mL Not Detected  Not Detected         Recent Labs   Lab Test 07/12/23  0935 09/26/23  0954 10/16/23  1019   DOSTAC 7/11/2023 9/25/2023 10/15/2023   TACROL 4.3* 5.5 4.6*     Recent Labs   Lab Test 01/27/20  0918 02/03/20  0948 07/20/23  0918   DOSMPA NTP 02/02/20 0915pm 7/19/2023  10:00 PM   MPACID 2.39 2.41 1.13   MPAG 54.6 50.4 27.4*         Giovany Quijano MD

## 2023-11-27 NOTE — PROGRESS NOTES
Virtual Visit Details    Type of service:  Video Visit   Video Start Time:  1533  Video End Time: 1554    Originating Location (pt. Location): Home  Distant Location (provider location):  Off-site  Platform used for Video Visit: Well      TRANSPLANT NEPHROLOGY CHRONIC POST TRANSPLANT VISIT    Assessment & Plan   # LDKT: Stable   - Baseline Creatinine:  ~ 1.0-1.2   - Proteinuria: Normal (<0.2 grams)   - Date DSA Last Checked: Jun/2023      Latest DSA: No cPRA: 0%   - BK Viremia: No   - Kidney Tx Biopsy: No    # Heart Tx: Patient appears to be stable.  Followed by Cardiology.    # Immunosuppression: Tacrolimus immediate release (goal 4-6) and Mycophenolate mofetil (dose 750 mg every 12 hours)   - Continue with intensive monitoring of immunosuppression for efficacy and toxicity.   - Changes: No    # Infection Prophylaxis:   - PJP: Sulfa/TMP (Bactrim)    # Hypertension: Controlled;  Goal BP: < 130/80   - Changes: No    # Diabetes: Borderline control (HbA1c 7-9%) Last HbA1c: 7.0%   - Management as per primary care.    # Anemia in Chronic Renal Disease: Hgb: Stable, near normal      ANASTASIYA: No   - Iron studies: Not checked recently    # Mineral Bone Disorder:   - Vitamin D; level: Normal        On supplement: No  - Calcium; level: High normal        On supplement: No    # Electrolytes:   - Potassium; level: Normal        On supplement: No  - Magnesium; level: Stable low        On supplement: Yes; Will increase magnesium oxide to 400 mg bid.  - Bicarbonate; level: Normal        On supplement: No    # PAD: No claudication symptoms.    # H/o CVA: No residual deficits.    # MGUS: Stable monoclonal IgG immunoglobulin of kappa light chain type with stable M-spike of ~ 0.3 with last check Mar/2023.   - Will recheck SPEP yearly, next due 3/2024.    # GERD: Asymptomatic on PPI.    # IPMN/Pancreatic Cyst: Likely side branch IPMN with negative biopsy with severe pancreas atrophy and waxing and waning of cyst size as noted on last  MRI/MRCP May/2023.  Followed by GI.    # Pulmonary Nodules: New groundglass nodules on CT chest Aug/2023.  Followed by Pulmonary with plans for repeat CT in 6 months (Feb/2024).    # RLE DVT: New diagnosis 11/2023.  Likely provoked following long plane trip and also care trip prior.  On anticoagulation with apixaban.    # Skin Cancer Risk:    - Discussed sun protection and recommend regular follow up with Dermatology.    # Medical Compliance: Yes    # COVID-19 Virus Review: Discussed COVID-19 virus and the potential medical risks.  Reviewed preventative health recommendations, including wearing a mask where appropriate.  Recommended COVID vaccination should be up to date with either an initial vaccination or booster shot when appropriate.  Asked the patient to inform the transplant center if they are exposed or diagnosed with this virus.    # COVID Vaccination Up To Date: Yes    # Transplant History:  Etiology of Kidney Failure: Diabetes mellitus type 2  Tx: LDKT and Heart Tx  Transplant: 12/19/2019 (Kidney), 6/14/2018 (Heart)  Significant changes in immunosuppression: None  Significant transplant-related complications: None    Transplant Office Phone Number: [phone]    Assessment and plan was discussed with the patient and he voiced his understanding and agreement.    Return visit: Return in about 1 year (around 11/27/2024).    Giovany Quijano MD    Chief Complaint   Mr. Nicholson is a 68 year old here for kidney transplant and immunosuppression management.    History of Present Illness    Mr. Nicholson reports feeling good overall with some medical complaints.  Patient developed right lower leg swelling a couple of weeks ago and went to the ER.  He was found to have a DVT and started on apixaban for planned 3 months.  Patient has no previous history of DVT, but he did travel to [place] via plane in September, as well as driving 5 hours in a car during that trip.    His energy level is good and remains  normal.  He is active and does get some exercise.  Denies any chest pain, but slight shortness of breath with exertion, which is a bit worse over the last month or so.  Some swelling on the right leg, where he has his blood clot, but none on the left.    Appetite is okay and remains normal.  His weight is down ~ 10 lbs.  No nausea, vomiting or diarrhea.  Heartburn symptoms are controlled on omeprazole.  No fever, sweats or chills.  No night sweats.    Home BP:  110-120/70-80s  with no lightheadedness.    Problem List   Patient Active Problem List   Diagnosis    GERD (gastroesophageal reflux disease)    Allergic rhinitis    Anemia in chronic renal disease    Dyslipidemia    MGUS (monoclonal gammopathy of unknown significance)    Ascending aortic aneurysm (H24)    Heart replaced by transplant (H)    Immunosuppressed status (H24)    IPMN (intraductal papillary mucinous neoplasm)    Kidney replaced by transplant    Aftercare following organ transplant    HTN, kidney transplant related    Secondary renal hyperparathyroidism (H24)    Vitamin D deficiency    Need for pneumocystis prophylaxis    Erectile dysfunction, unspecified erectile dysfunction type    Nocturia    History of hernia repair    Decreased hearing, unspecified laterality    Overactive bladder    Diabetes mellitus, type 2 (H)    Hypercalcemia    CKD (chronic kidney disease) stage 2, GFR 60-89 ml/min    Acute deep vein thrombosis (DVT) of distal vein of right lower extremity (H)       Allergies   Allergies   Allergen Reactions    Cats Shortness Of Breath and Anaphylaxis    Penicillins Hives    Shrimp Shortness Of Breath and Anaphylaxis    Isosorbide Nitrate      hypotension    Norco [Hydrocodone-Acetaminophen] Nausea and Vomiting       Medications   Current Outpatient Medications   Medication Sig    magnesium oxide (MAG-OX) 400 MG tablet Take 1 tablet (400 mg) by mouth 2 times daily    alcohol swab prep pads Use to swab area of injection/davida as directed.     amLODIPine (NORVASC) 5 MG tablet Take 1 tablet (5 mg) by mouth daily    apixaban ANTICOAGULANT (ELIQUIS) 5 MG tablet Take 1 tablet (5 mg) by mouth 2 times daily    Apixaban Starter Pack (ELIQUIS DVT/PE STARTER PACK) 5 MG TBPK Take 10 mg by mouth 2 times daily for 7 days, THEN 5 mg 2 times daily for 23 days.    aspirin 81 MG EC tablet Take 81 mg by mouth every evening    atorvastatin (LIPITOR) 40 MG tablet Take 1 tablet (40 mg) by mouth every evening    blood glucose (ACCU-CHEK GUIDE) test strip Use to test blood sugar 3 times daily or as directed.    blood glucose (ACCU-CHEK SOFTCLIX) lancing device Lancing device to be used with lancets. Test blood sugar 3 times daily or as directed    blood glucose (NO BRAND SPECIFIED) test strip Use to test blood sugar 3 times daily or as directed. Any covered brand that works with meter.    blood glucose monitoring (ACCU-CHEK FASTCLIX) lancets USE TO TEST 3 TIMES DAILY OR AS DIRECTED.    blood glucose monitoring (SOFTCLIX) lancets Use to test blood sugar 3 times daily.    Blood Glucose Monitoring Suppl (ACCU-CHEK GUIDE) w/Device KIT USE TO TEST BLOOD SUGAR THREE TIMES DAILY AS DIRECTED.    Calcium Polycarbophil (FIBER) 625 MG tablet Take by mouth every morning    empagliflozin (JARDIANCE) 10 MG TABS tablet Take 1 tablet (10 mg) by mouth daily    loratadine (CLARITIN) 10 MG tablet Take 10 mg by mouth every evening Patient takes at bedtime    metFORMIN (GLUCOPHAGE XR) 500 MG 24 hr tablet Take 4 tabs every AM    Multiple Vitamins-Minerals (HAIR SKIN NAILS PO) Take by mouth every morning    mycophenolate (GENERIC EQUIVALENT) 250 MG capsule Take 2 capsules (500 mg) by mouth 2 times daily    OMEPRAZOLE PO Take 20 mg by mouth every morning    sulfamethoxazole-trimethoprim (BACTRIM) 400-80 MG tablet Take 1 tablet by mouth every morning    tacrolimus (GENERIC EQUIVALENT) 0.5 MG capsule Take 1 capsule (0.5 mg) by mouth 2 times daily    tacrolimus (GENERIC EQUIVALENT) 1 MG capsule ON HOLD  due to dose change    thin (NO BRAND SPECIFIED) lancets Use with lanceting device 3x daily. Any covered brand that works with lancing device.     Current Facility-Administered Medications   Medication    cilgavimab 300 mg/tixagevimab 300 mg (EVUSHELD) - FULL DOSE     Facility-Administered Medications Ordered in Other Visits   Medication    diatrizoate meglumine-sodium (GASTROGRAFIN/GASTROVIEW) 66-10 % solution 480 mL     Medications Discontinued During This Encounter   Medication Reason    magnesium 250 MG tablet        Physical Exam   Vital Signs: BP 98/79   Pulse 98   Wt 78.8 kg (173 lb 12.8 oz)   BMI 26.43 kg/m      GENERAL APPEARANCE: alert and no distress  HENT: no obvious abnormalities on appearance  RESP: breathing appears unremarkable with normal rate, no audible wheezing or cough and no apparent shortness of breath with conversation  MS: extremities normal - no gross deformities noted  SKIN: no apparent rash and normal skin tone  NEURO: speech is clear with no obvious neurological deficits  PSYCH: mentation appears normal and affect normal    Data         Latest Ref Rng & Units 11/26/2023    12:33 PM 10/16/2023    10:19 AM 9/26/2023     9:54 AM   Renal   Sodium 135 - 145 mmol/L 136  138  138    K 3.4 - 5.3 mmol/L 3.8  4.2  4.0    Cl 98 - 107 mmol/L 102  103  102    Cl (external) 98 - 107 mmol/L 102  103  102    CO2 22 - 29 mmol/L 19  25  25    Urea Nitrogen 8.0 - 23.0 mg/dL 11.5  13.7  14.3    Creatinine 0.67 - 1.17 mg/dL 0.86  1.11  0.94    Glucose 70 - 99 mg/dL 140  193  145    Calcium 8.8 - 10.2 mg/dL 10.3  10.0  11.5    Magnesium 1.7 - 2.3 mg/dL 1.6            Latest Ref Rng & Units 7/20/2023     9:18 AM 6/19/2023     9:39 AM 3/14/2023     9:53 AM   Bone Health   Phosphorus 2.5 - 4.5 mg/dL 3.4  3.1     Vit D Def 20 - 75 ug/L   60          Latest Ref Rng & Units 11/26/2023    12:33 PM 10/16/2023    10:19 AM 9/26/2023     9:54 AM   Heme   WBC 4.0 - 11.0 10e3/uL 7.3  5.2  5.7    Hgb 13.3 - 17.7 g/dL  14.8  13.3  14.2    Plt 150 - 450 10e3/uL 181  210  237          Latest Ref Rng & Units 11/26/2023    12:33 PM 6/19/2023     9:39 AM 1/31/2023    10:09 AM   Liver   AP 40 - 150 U/L 157  147  129    TBili <=1.2 mg/dL 1.1  0.9  0.8    ALT 0 - 70 U/L 18  21  22    AST 0 - 45 U/L 31  30  27    Tot Protein 6.4 - 8.3 g/dL 8.5  7.4  7.4    Albumin 3.5 - 5.2 g/dL 4.6  4.4  4.4          Latest Ref Rng & Units 9/26/2023     9:54 AM 6/19/2023     9:39 AM 1/31/2023    10:09 AM   Pancreas   A1C <5.7 % 7.0  7.3  6.9          Latest Ref Rng & Units 12/10/2019    11:21 AM 6/10/2019     1:15 PM 7/10/2018     7:11 AM   Iron studies   Iron 35 - 180 ug/dL 84  118  66    Iron Saturation Index 15 - 46 % 35  47  28    Ferritin 26 - 388 ng/mL 445  644  771          Latest Ref Rng & Units 6/19/2023     9:39 AM 6/13/2022     7:57 AM 7/6/2021    10:03 AM   [facility] Txp Virology   CMV QUANT IU/ML Not Detected IU/mL Not Detected  Not Detected     BK Spec    Plasma    BK Res BKNEG^BK Virus DNA Not Detected copies/mL   BK Virus DNA Not Detected    BK Log <2.7 Log copies/mL   Not Calculated    EBV DNA COPIES/ML Not Detected copies/mL Not Detected  Not Detected         Recent Labs   Lab Test 07/12/23  0935 09/26/23  0954 10/16/23  1019   DOSTAC 7/11/2023 9/25/2023 10/15/2023   TACROL 4.3* 5.5 4.6*     Recent Labs   Lab Test 01/27/20  0918 02/03/20  0948 07/20/23  0918   DOSMPA NTP 02/02/20 0915pm 7/19/2023  10:00 PM   MPACID 2.39 2.41 1.13   MPAG 54.6 50.4 27.4*

## 2023-11-27 NOTE — TELEPHONE ENCOUNTER
Spoke with pt to reschedule 5/3 appt with [facility] to 5/1   Froylan Ordoñez on 11/27/2023 at 9:44 AM

## 2023-11-27 NOTE — NURSING NOTE
Is the patient currently in the state of [place]? YES    Visit mode:VIDEO    If the visit is dropped, the patient can be reconnected by: VIDEO VISIT: Send to e-mail at: [email or web address]    Will anyone else be joining the visit? NO  (If patient encounters technical issues they should call [phone]    How would you like to obtain your AVS? MyChart    Are changes needed to the allergy or medication list? Pt stated no changes to allergies and Pt stated no med changes    Reason for visit: RECHECK    PT went into urgent care yesterday for leg swelling, was then sent to ED for an ultra sound and found that he does have a blood clot-rt calf    Lesly MCHUGHF

## 2023-11-27 NOTE — LETTER
11/27/2023      RE: Murray Nicholson  [address]  [place] [address]       Virtual Visit Details    Type of service:  Video Visit   Video Start Time:  1533  Video End Time: 1554    Originating Location (pt. Location): Home  Distant Location (provider location):  Off-site  Platform used for Video Visit: [facility]      TRANSPLANT NEPHROLOGY CHRONIC POST TRANSPLANT VISIT    Assessment & Plan  # LDKT: Stable   - Baseline Creatinine:  ~ 1.0-1.2   - Proteinuria: Normal (<0.2 grams)   - Date DSA Last Checked: Jun/2023      Latest DSA: No cPRA: 0%   - BK Viremia: No   - Kidney Tx Biopsy: No    # Heart Tx: Patient appears to be stable.  Followed by Cardiology.    # Immunosuppression: Tacrolimus immediate release (goal 4-6) and Mycophenolate mofetil (dose 750 mg every 12 hours)   - Continue with intensive monitoring of immunosuppression for efficacy and toxicity.   - Changes: No    # Infection Prophylaxis:   - PJP: Sulfa/TMP (Bactrim)    # Hypertension: Controlled;  Goal BP: < 130/80   - Changes: No    # Diabetes: Borderline control (HbA1c 7-9%) Last HbA1c: 7.0%   - Management as per primary care.    # Anemia in Chronic Renal Disease: Hgb: Stable, near normal      ANASTASIYA: No   - Iron studies: Not checked recently    # Mineral Bone Disorder:   - Vitamin D; level: Normal        On supplement: No  - Calcium; level: High normal        On supplement: No    # Electrolytes:   - Potassium; level: Normal        On supplement: No  - Magnesium; level: Stable low        On supplement: Yes; Will increase magnesium oxide to 400 mg bid.  - Bicarbonate; level: Normal        On supplement: No    # PAD: No claudication symptoms.    # H/o CVA: No residual deficits.    # MGUS: Stable monoclonal IgG immunoglobulin of kappa light chain type with stable M-spike of ~ 0.3 with last check Mar/2023.   - Will recheck SPEP yearly, next due 3/2024.    # GERD: Asymptomatic on PPI.    # IPMN/Pancreatic Cyst: Likely side branch IPMN with negative  biopsy with severe pancreas atrophy and waxing and waning of cyst size as noted on last MRI/MRCP May/2023.  Followed by GI.    # Pulmonary Nodules: New groundglass nodules on CT chest Aug/2023.  Followed by Pulmonary with plans for repeat CT in 6 months (Feb/2024).    # RLE DVT: New diagnosis 11/2023.  Likely provoked following long plane trip and also care trip prior.  On anticoagulation with apixaban.    # Skin Cancer Risk:    - Discussed sun protection and recommend regular follow up with Dermatology.    # Medical Compliance: Yes    # COVID-19 Virus Review: Discussed COVID-19 virus and the potential medical risks.  Reviewed preventative health recommendations, including wearing a mask where appropriate.  Recommended COVID vaccination should be up to date with either an initial vaccination or booster shot when appropriate.  Asked the patient to inform the transplant center if they are exposed or diagnosed with this virus.    # COVID Vaccination Up To Date: Yes    # Transplant History:  Etiology of Kidney Failure: Diabetes mellitus type 2  Tx: LDKT and Heart Tx  Transplant: 12/19/2019 (Kidney), 6/14/2018 (Heart)  Significant changes in immunosuppression: None  Significant transplant-related complications: None    Transplant Office Phone Number: [phone]    Assessment and plan was discussed with the patient and he voiced his understanding and agreement.    Return visit: Return in about 1 year (around 11/27/2024).    Giovany Quijano MD    Chief Complaint  Mr. Nicholson is a 68 year old here for kidney transplant and immunosuppression management.    History of Present Illness   Mr. Nicholson reports feeling good overall with some medical complaints.  Patient developed right lower leg swelling a couple of weeks ago and went to the ER.  He was found to have a DVT and started on apixaban for planned 3 months.  Patient has no previous history of DVT, but he did travel to [place] via plane in September, as well as  driving 5 hours in a car during that trip.    His energy level is good and remains normal.  He is active and does get some exercise.  Denies any chest pain, but slight shortness of breath with exertion, which is a bit worse over the last month or so.  Some swelling on the right leg, where he has his blood clot, but none on the left.    Appetite is okay and remains normal.  His weight is down ~ 10 lbs.  No nausea, vomiting or diarrhea.  Heartburn symptoms are controlled on omeprazole.  No fever, sweats or chills.  No night sweats.    Home BP:  110-120/70-80s  with no lightheadedness.    Problem List  Patient Active Problem List   Diagnosis     GERD (gastroesophageal reflux disease)     Allergic rhinitis     Anemia in chronic renal disease     Dyslipidemia     MGUS (monoclonal gammopathy of unknown significance)     Ascending aortic aneurysm (H24)     Heart replaced by transplant (H)     Immunosuppressed status (H24)     IPMN (intraductal papillary mucinous neoplasm)     Kidney replaced by transplant     Aftercare following organ transplant     HTN, kidney transplant related     Secondary renal hyperparathyroidism (H24)     Vitamin D deficiency     Need for pneumocystis prophylaxis     Erectile dysfunction, unspecified erectile dysfunction type     Nocturia     History of hernia repair     Decreased hearing, unspecified laterality     Overactive bladder     Diabetes mellitus, type 2 (H)     Hypercalcemia     CKD (chronic kidney disease) stage 2, GFR 60-89 ml/min     Acute deep vein thrombosis (DVT) of distal vein of right lower extremity (H)       Allergies  Allergies   Allergen Reactions     Cats Shortness Of Breath and Anaphylaxis     Penicillins Hives     Shrimp Shortness Of Breath and Anaphylaxis     Isosorbide Nitrate      hypotension     Norco [Hydrocodone-Acetaminophen] Nausea and Vomiting       Medications  Current Outpatient Medications   Medication Sig     magnesium oxide (MAG-OX) 400 MG tablet Take 1 tablet  (400 mg) by mouth 2 times daily     alcohol swab prep pads Use to swab area of injection/davida as directed.     amLODIPine (NORVASC) 5 MG tablet Take 1 tablet (5 mg) by mouth daily     apixaban ANTICOAGULANT (ELIQUIS) 5 MG tablet Take 1 tablet (5 mg) by mouth 2 times daily     Apixaban Starter Pack (ELIQUIS DVT/PE STARTER PACK) 5 MG TBPK Take 10 mg by mouth 2 times daily for 7 days, THEN 5 mg 2 times daily for 23 days.     aspirin 81 MG EC tablet Take 81 mg by mouth every evening     atorvastatin (LIPITOR) 40 MG tablet Take 1 tablet (40 mg) by mouth every evening     blood glucose (ACCU-CHEK GUIDE) test strip Use to test blood sugar 3 times daily or as directed.     blood glucose (ACCU-CHEK SOFTCLIX) lancing device Lancing device to be used with lancets. Test blood sugar 3 times daily or as directed     blood glucose (NO BRAND SPECIFIED) test strip Use to test blood sugar 3 times daily or as directed. Any covered brand that works with meter.     blood glucose monitoring (ACCU-CHEK FASTCLIX) lancets USE TO TEST 3 TIMES DAILY OR AS DIRECTED.     blood glucose monitoring (SOFTCLIX) lancets Use to test blood sugar 3 times daily.     Blood Glucose Monitoring Suppl (ACCU-CHEK GUIDE) w/Device KIT USE TO TEST BLOOD SUGAR THREE TIMES DAILY AS DIRECTED.     Calcium Polycarbophil (FIBER) 625 MG tablet Take by mouth every morning     empagliflozin (JARDIANCE) 10 MG TABS tablet Take 1 tablet (10 mg) by mouth daily     loratadine (CLARITIN) 10 MG tablet Take 10 mg by mouth every evening Patient takes at bedtime     metFORMIN (GLUCOPHAGE XR) 500 MG 24 hr tablet Take 4 tabs every AM     Multiple Vitamins-Minerals (HAIR SKIN NAILS PO) Take by mouth every morning     mycophenolate (GENERIC EQUIVALENT) 250 MG capsule Take 2 capsules (500 mg) by mouth 2 times daily     OMEPRAZOLE PO Take 20 mg by mouth every morning     sulfamethoxazole-trimethoprim (BACTRIM) 400-80 MG tablet Take 1 tablet by mouth every morning     tacrolimus (GENERIC  EQUIVALENT) 0.5 MG capsule Take 1 capsule (0.5 mg) by mouth 2 times daily     tacrolimus (GENERIC EQUIVALENT) 1 MG capsule ON HOLD due to dose change     thin (NO BRAND SPECIFIED) lancets Use with lanceting device 3x daily. Any covered brand that works with lancing device.     Current Facility-Administered Medications   Medication     cilgavimab 300 mg/tixagevimab 300 mg (EVUSHELD) - FULL DOSE     Facility-Administered Medications Ordered in Other Visits   Medication     diatrizoate meglumine-sodium (GASTROGRAFIN/GASTROVIEW) 66-10 % solution 480 mL     Medications Discontinued During This Encounter   Medication Reason     magnesium 250 MG tablet        Physical Exam  Vital Signs: BP 98/79   Pulse 98   Wt 78.8 kg (173 lb 12.8 oz)   BMI 26.43 kg/m      GENERAL APPEARANCE: alert and no distress  HENT: no obvious abnormalities on appearance  RESP: breathing appears unremarkable with normal rate, no audible wheezing or cough and no apparent shortness of breath with conversation  MS: extremities normal - no gross deformities noted  SKIN: no apparent rash and normal skin tone  NEURO: speech is clear with no obvious neurological deficits  PSYCH: mentation appears normal and affect normal    Data        Latest Ref Rng & Units 11/26/2023    12:33 PM 10/16/2023    10:19 AM 9/26/2023     9:54 AM   Renal   Sodium 135 - 145 mmol/L 136  138  138    K 3.4 - 5.3 mmol/L 3.8  4.2  4.0    Cl 98 - 107 mmol/L 102  103  102    Cl (external) 98 - 107 mmol/L 102  103  102    CO2 22 - 29 mmol/L 19  25  25    Urea Nitrogen 8.0 - 23.0 mg/dL 11.5  13.7  14.3    Creatinine 0.67 - 1.17 mg/dL 0.86  1.11  0.94    Glucose 70 - 99 mg/dL 140  193  145    Calcium 8.8 - 10.2 mg/dL 10.3  10.0  11.5    Magnesium 1.7 - 2.3 mg/dL 1.6            Latest Ref Rng & Units 7/20/2023     9:18 AM 6/19/2023     9:39 AM 3/14/2023     9:53 AM   Bone Health   Phosphorus 2.5 - 4.5 mg/dL 3.4  3.1     Vit D Def 20 - 75 ug/L   60          Latest Ref Rng & Units  11/26/2023    12:33 PM 10/16/2023    10:19 AM 9/26/2023     9:54 AM   Heme   WBC 4.0 - 11.0 10e3/uL 7.3  5.2  5.7    Hgb 13.3 - 17.7 g/dL 14.8  13.3  14.2    Plt 150 - 450 10e3/uL 181  210  237          Latest Ref Rng & Units 11/26/2023    12:33 PM 6/19/2023     9:39 AM 1/31/2023    10:09 AM   Liver   AP 40 - 150 U/L 157  147  129    TBili <=1.2 mg/dL 1.1  0.9  0.8    ALT 0 - 70 U/L 18  21  22    AST 0 - 45 U/L 31  30  27    Tot Protein 6.4 - 8.3 g/dL 8.5  7.4  7.4    Albumin 3.5 - 5.2 g/dL 4.6  4.4  4.4          Latest Ref Rng & Units 9/26/2023     9:54 AM 6/19/2023     9:39 AM 1/31/2023    10:09 AM   Pancreas   A1C <5.7 % 7.0  7.3  6.9          Latest Ref Rng & Units 12/10/2019    11:21 AM 6/10/2019     1:15 PM 7/10/2018     7:11 AM   Iron studies   Iron 35 - 180 ug/dL 84  118  66    Iron Saturation Index 15 - 46 % 35  47  28    Ferritin 26 - 388 ng/mL 445  644  771          Latest Ref Rng & Units 6/19/2023     9:39 AM 6/13/2022     7:57 AM 7/6/2021    10:03 AM   [facility] Txp Virology   CMV QUANT IU/ML Not Detected IU/mL Not Detected  Not Detected     BK Spec    Plasma    BK Res BKNEG^BK Virus DNA Not Detected copies/mL   BK Virus DNA Not Detected    BK Log <2.7 Log copies/mL   Not Calculated    EBV DNA COPIES/ML Not Detected copies/mL Not Detected  Not Detected         Recent Labs   Lab Test 07/12/23  0935 09/26/23  0954 10/16/23  1019   DOSTAC 7/11/2023 9/25/2023 10/15/2023   TACROL 4.3* 5.5 4.6*     Recent Labs   Lab Test 01/27/20  0918 02/03/20  0948 07/20/23  0918   DOSMPA NTP 02/02/20 0915pm 7/19/2023  10:00 PM   MPACID 2.39 2.41 1.13   MPAG 54.6 50.4 27.4*       Giovany Quijano MD

## 2023-11-30 ENCOUNTER — MYC MEDICAL ADVICE (OUTPATIENT)
Dept: FAMILY MEDICINE | Facility: CLINIC | Age: 68
End: 2023-11-30
Payer: MEDICARE

## 2023-11-30 NOTE — TELEPHONE ENCOUNTER
"Alicia-Please review and advise if virtual ER follow up visit okay to discuss diagnosis and Eliquis plan?  You have openings on 12/4/23.    \"On Sunday I went to [facility] at 9am. My Right Calf was Swollen and i was in about an 8 in pain. I thought it might be Gout. But after being seen by Dr Maliha Martinez, she ordered me to the ER at [facility], for an Ultra Sound. To verify her suspicion of a Blood Clot. I was seen by Dr Clemons and after my Ultra Sound, yes indeed there was a Blood clot.  He prescribed the Starter Pack for Eliquis. I am now in Day 5 of taking Apizaban. On Monday I had a previously scheduled appt. With my Nephrologist, Dr Quijano. He said that any future prescriptions Of Eliquis and following the treatment of Clot should go through. Please let me know how we should proceed.  Thank you and Cheers\"    Thank you!  REMI KaurN, RN-[facility]    "

## 2023-12-04 ENCOUNTER — VIRTUAL VISIT (OUTPATIENT)
Dept: FAMILY MEDICINE | Facility: CLINIC | Age: 68
End: 2023-12-04
Payer: MEDICARE

## 2023-12-04 DIAGNOSIS — I82.4Y1 ACUTE DEEP VEIN THROMBOSIS (DVT) OF PROXIMAL VEIN OF RIGHT LOWER EXTREMITY (H): Primary | ICD-10-CM

## 2023-12-04 PROCEDURE — 99214 OFFICE O/P EST MOD 30 MIN: CPT | Mod: 95 | Performed by: NURSE PRACTITIONER

## 2023-12-04 NOTE — PROGRESS NOTES
"Murray is a 68 year old who is being evaluated via a billable video visit.      How would you like to obtain your AVS? MyChart  If the video visit is dropped, the invitation should be resent by: Text to cell phone: [phone]  Will anyone else be joining your video visit? No          Assessment & Plan     Acute deep vein thrombosis (DVT) of proximal vein of right lower extremity (H)  Possibly provoked DVT from travel, however, the timeframe is delayed considering his trip was in September and DVT did not occur until November.  Will rule out cardiac etiology and have patient follow up with hematology for recommendations on duration of DOAC treatment.  - Adult Oncology/Hematology  Referral; Future  - Adult Leadless EKG Monitor 8 to 14 Days; Future  - Echocardiogram Complete; Future  - apixaban ANTICOAGULANT (ELIQUIS) 5 MG tablet; Take 1 tablet (5 mg) by mouth 2 times daily      30 minutes spent by me on the date of the encounter doing chart review, history and exam, documentation and further activities per the note     MED REC REQUIRED  Post Medication Reconciliation Status: discharge medications reconciled, continue medications without change  BMI:   Estimated body mass index is 26.43 kg/m  as calculated from the following:    Height as of 11/26/23: 1.727 m (5' 8\").    Weight as of 11/27/23: 78.8 kg (173 lb 12.8 oz).           VERONICA Muse CNP  [facility]    Dino Gao is a 68 year old, presenting for the following health issues:  Hospital F/U      12/4/2023     1:18 PM   Additional Questions   Roomed by Margaret WATTS     ED/UC Followup:    Facility:  [facility]   Date of visit: 11/26/2023  Reason for visit: Leg swelling/blood clot  Current Status: Still going to working, still swollen, but better.      Recently diagnosed with RLE DVT.  Had been on a trip in September with a lot of long driving.    Has not noticed palpitations.  "   When it occurred, went to his job to drop something off, walking really fast, calf felt tight and he was limping.  Felt similar to a previous gout flare.  Went to  and was sent to the ED and diagnosed with a clot.          Review of Systems   Constitutional, HEENT, cardiovascular, pulmonary, gi and gu systems are negative, except as otherwise noted.      Objective    Vitals - Patient Reported  Pain Score: No Pain (0)        Physical Exam   GENERAL: Healthy, alert and no distress  EYES: Eyes grossly normal to inspection.  No discharge or erythema, or obvious scleral/conjunctival abnormalities.  RESP: No audible wheeze, cough, or visible cyanosis.  No visible retractions or increased work of breathing.    SKIN: Visible skin clear. No significant rash, abnormal pigmentation or lesions.  NEURO: Cranial nerves grossly intact.  Mentation and speech appropriate for age.  PSYCH: Mentation appears normal, affect normal/bright, judgement and insight intact, normal speech and appearance well-groomed.                Video-Visit Details    Type of service:  Video Visit   Video Start Time:  136pm  Video End Time: 157pm    Originating Location (pt. Location): Home    Distant Location (provider location):  Off-site  Platform used for Video Visit: [employer]

## 2023-12-11 ENCOUNTER — MYC MEDICAL ADVICE (OUTPATIENT)
Dept: FAMILY MEDICINE | Facility: CLINIC | Age: 68
End: 2023-12-11
Payer: MEDICARE

## 2023-12-11 DIAGNOSIS — I82.4Y1 ACUTE DEEP VEIN THROMBOSIS (DVT) OF PROXIMAL VEIN OF RIGHT LOWER EXTREMITY (H): ICD-10-CM

## 2023-12-11 DIAGNOSIS — Z29.11 NEED FOR VACCINATION AGAINST RESPIRATORY SYNCYTIAL VIRUS: ICD-10-CM

## 2023-12-13 RX ORDER — RESPIRATORY SYNCYTIAL VIRUS VACCINE 120MCG/0.5
0.5 KIT INTRAMUSCULAR ONCE
Qty: 1 EACH | Refills: 0 | Status: CANCELLED | OUTPATIENT
Start: 2023-12-13 | End: 2023-12-13

## 2023-12-13 NOTE — TELEPHONE ENCOUNTER
"Alicia-Please review and advise/sign if agree.  Given patient has Medicare, writer would recommend patient receive RSV vaccine in a pharmacy setting.    \"Happy Holidays!!!   Now for one month with my insurance [employer]egan wants to charge me $162. for the one month you sent to them.   [employer] has a supposed les expensive Pharmacy. I still have 2 weeks before i Must pull the trigger on something . So could please send the scrip to them so I can find out what their Pricing is?  [facility]  [address], [address], [place] [address]  Phone: [phone]  Fax: [phone]  Thank You.  Also we never talked about, RSV. do i need it and do i need a Presciption? Because i understand it is Expensive. Thank you once again.\"    Thank you!  REMI KaurN, RN-BC  [facility]    "

## 2023-12-23 PROBLEM — I82.4Z1 ACUTE DEEP VEIN THROMBOSIS (DVT) OF DISTAL VEIN OF RIGHT LOWER EXTREMITY (H): Status: ACTIVE | Noted: 2023-12-23

## 2023-12-23 PROBLEM — D49.0 IPMN (INTRADUCTAL PAPILLARY MUCINOUS NEOPLASM): Status: ACTIVE | Noted: 2019-11-13

## 2023-12-23 NOTE — PATIENT INSTRUCTIONS
Patient Recommendations:  - Increase magnesium oxide to 400 mg twice daily, once with lunch and once with supper.    Transplant Patient Information  Your Post Transplant Coordinator is: Colette Martin  For non urgent items, we encourage you to contact your coordinator/care team online via [employer]  You and your care team can also contact your transplant coordinator Monday - Friday, 8am - 5pm at [phone] (Option 2 to reach the coordinator or Option 4 to schedule an appointment).  After hours for urgent matters, please call [facility] at [phone].

## 2024-01-03 NOTE — TELEPHONE ENCOUNTER
"Patient states he has talked with several people and is getting no help.  States he is currently in hospital and has been for quite some time.  He is to be \"entering information\" into [facility] daily and is having problems.  States \"it is stuck in April\".  FNA spoke with scheduling who stated he would have to speak with clinic; FNA transferred to PCP clinic backline.  " Strep screen negative, please see RSV diagnosis for further syndrome details.

## 2024-01-15 ENCOUNTER — MYC MEDICAL ADVICE (OUTPATIENT)
Dept: TRANSPLANT | Facility: CLINIC | Age: 69
End: 2024-01-15

## 2024-01-15 ENCOUNTER — OFFICE VISIT (OUTPATIENT)
Dept: HEMATOLOGY | Facility: CLINIC | Age: 69
End: 2024-01-15
Attending: NURSE PRACTITIONER
Payer: MEDICARE

## 2024-01-15 VITALS
DIASTOLIC BLOOD PRESSURE: 86 MMHG | TEMPERATURE: 97.4 F | BODY MASS INDEX: 26.91 KG/M2 | SYSTOLIC BLOOD PRESSURE: 125 MMHG | HEART RATE: 90 BPM | WEIGHT: 177 LBS | OXYGEN SATURATION: 100 %

## 2024-01-15 DIAGNOSIS — Z79.899 ENCOUNTER FOR LONG-TERM (CURRENT) USE OF MEDICATIONS: ICD-10-CM

## 2024-01-15 DIAGNOSIS — Z94.1 HEART REPLACED BY TRANSPLANT (H): Primary | ICD-10-CM

## 2024-01-15 DIAGNOSIS — Z13.220 ENCOUNTER FOR LIPID SCREENING FOR CARDIOVASCULAR DISEASE: ICD-10-CM

## 2024-01-15 DIAGNOSIS — I82.4Y1 ACUTE DEEP VEIN THROMBOSIS (DVT) OF PROXIMAL VEIN OF RIGHT LOWER EXTREMITY (H): ICD-10-CM

## 2024-01-15 DIAGNOSIS — Z13.6 ENCOUNTER FOR LIPID SCREENING FOR CARDIOVASCULAR DISEASE: ICD-10-CM

## 2024-01-15 PROCEDURE — 36415 COLL VENOUS BLD VENIPUNCTURE: CPT | Performed by: INTERNAL MEDICINE

## 2024-01-15 PROCEDURE — 86146 BETA-2 GLYCOPROTEIN ANTIBODY: CPT | Performed by: INTERNAL MEDICINE

## 2024-01-15 PROCEDURE — 86147 CARDIOLIPIN ANTIBODY EA IG: CPT | Performed by: INTERNAL MEDICINE

## 2024-01-15 PROCEDURE — G0463 HOSPITAL OUTPT CLINIC VISIT: HCPCS | Performed by: INTERNAL MEDICINE

## 2024-01-15 PROCEDURE — 99205 OFFICE O/P NEW HI 60 MIN: CPT | Performed by: INTERNAL MEDICINE

## 2024-01-15 NOTE — PROGRESS NOTES
[facility] Consult    Reasons for Consult: -Deep vein thrombosis (DVT)    History of Present Illness: Mr. Nicholson is here for recommendations about anticoagulation regarding a right femoral DVT, diagnosed 11/26/2023.  He is on apixaban 5 mg twice daily.  He reports that a few days before the DVT was diagnosed, he started noticing some right calf pain and also hamstring pain.  He measured his legs and the right leg was slightly larger, so he sought medical attention in the DVT was diagnosed.  He had not had any fevers, chills, upper respiratory symptoms prior to having the symptoms.  He had had a plane trip to [place] and then driving to [place] about 2 months before the DVT diagnosis.    He has been on Eliquis 5 mg twice daily.  He says it is quite expensive with $162 co-pay per month.  His leg feels back to normal.  No swelling.  No shortness of breath.    He says that his hands and feet are cold, no Raynaud's syndrome.  He is on his antirejection medications, which have been gradually reduced.  He says he is feeling really well since having the kidney transplant.  His weight is stable.  He is trying to eat more vegetables and lose some weight.    Past Medical History:  - Status post heart transplant 2018 for heart failure with preserved ejection fraction, coronary artery disease  -Status post kidney transplant 2019  -Sleep apnea  - Hypertension  - GERD  - Dyslipidemia  -Type 2 diabetes  -MGUS IgG kappa-first noted 2013  -Status post colonoscopy 5/26/2023-benign polyps.    Medications:  - Amlodipine  - Apixaban  - Aspirin  - Atorvastatin  -Fiber  - Empagliflozin  - Loratadine  - Magnesium oxide  - Metformin  -Multivitamin  - Mycophenolate  - Omeprazole  - Bactrim  - Tacrolimus    Family History: father CAD., diabetes.  No family history of DVT or PE.    Social History: smoking-former, quit 1994.  Alcohol-occasional.  He is working part-time at [employer], selling footwear.  He does  a lot of walking at work.    Review of systems:  As in HPI.  The rest of the > 10 point review of systems was negative.      PHYSICAL EXAMINATION:  /86 (BP Location: Right arm, Patient Position: Sitting, Cuff Size: Adult Regular)   Pulse 90   Temp 97.4  F (36.3  C) (Tympanic)   Wt 80.3 kg (177 lb)   SpO2 100%   BMI 26.91 kg/m      General appearance:  Patient is 69 year old man in no acute distress.     HEENT:  No pallor, icterus,    Lymph nodes:  No cervical, supraclavicular, axillary, or inguinal lymphadenopathy.   Lungs:  Clear to auscultation bilaterally.   Heart:  Regular rate and rhythm; no S3 S4 or murmer.     Abdomen:  Positive bowel sounds, soft and nontender, nondistended.  No hepatomegaly. No splenomegaly appreciated.    Extremities:  No joint swelling or tenderness.  No ankle edema.     Skin:  No rash, no petechiae or ecchymoses.    Labs:   Latest Reference Range & Units 11/26/23 12:33   Sodium 135 - 145 mmol/L 136   Potassium 3.4 - 5.3 mmol/L 3.8   Chloride 98 - 107 mmol/L 102   Carbon Dioxide (CO2) 22 - 29 mmol/L 19 (L)   Urea Nitrogen 8.0 - 23.0 mg/dL 11.5   Creatinine 0.67 - 1.17 mg/dL 0.86   GFR Estimate >60 mL/min/1.73m2 >90   Calcium 8.8 - 10.2 mg/dL 10.3 (H)   Anion Gap 7 - 15 mmol/L 15   Magnesium 1.7 - 2.3 mg/dL 1.6 (L)   Albumin 3.5 - 5.2 g/dL 4.6   Protein Total 6.4 - 8.3 g/dL 8.5 (H)   Alkaline Phosphatase 40 - 150 U/L 157 (H)   ALT 0 - 70 U/L 18   AST 0 - 45 U/L 31   Bilirubin Total <=1.2 mg/dL 1.1   Glucose 70 - 99 mg/dL 140 (H)   N-Terminal Pro BNP Inpatient 0 - 900 pg/mL 435   Uric Acid 3.4 - 7.0 mg/dL 4.4   WBC 4.0 - 11.0 10e3/uL 7.3   Hemoglobin 13.3 - 17.7 g/dL 14.8   Hematocrit 40.0 - 53.0 % 44.6   Platelet Count 150 - 450 10e3/uL 181   RBC Count 4.40 - 5.90 10e6/uL 4.96   MCV 78 - 100 fL 90   MCH 26.5 - 33.0 pg 29.8   MCHC 31.5 - 36.5 g/dL 33.2   RDW 10.0 - 15.0 % 13.0   % Neutrophils % 67   % Lymphocytes % 21   % Monocytes % 8   % Eosinophils % 3   % Basophils % 1    Absolute Basophils 0.0 - 0.2 10e3/uL 0.1   Absolute Eosinophils 0.0 - 0.7 10e3/uL 0.2   Absolute Immature Granulocytes <=0.4 10e3/uL 0.0   Absolute Lymphocytes 0.8 - 5.3 10e3/uL 1.5   Absolute Monocytes 0.0 - 1.3 10e3/uL 0.6   % Immature Granulocytes % 0   Absolute Neutrophils 1.6 - 8.3 10e3/uL 5.0   (L): Data is abnormally low  (H): Data is abnormally high  Imaging:  EXAMINATION: DOPPLER VENOUS ULTRASOUND OF THE RIGHT LOWER EXTREMITY,  11/26/2023 2:34 PM      COMPARISON: 5/2/2018.     HISTORY: Asymmetric leg swelling right greater than left, concerning  for DVT.     TECHNIQUE:  Gray-scale evaluation with compression, spectral flow, and  color Doppler assessment of the deep venous system of the right leg  from groin to knee, and then at the ankle.     FINDINGS:  In the right lower extremity, there is occlusive DVT from the right  femoral vein through the popliteal vein and extending into the  peroneal and gastrocnemius veins. The right common femoral and  posterior tibial veins are patent.                                                               IMPRESSION:  Occlusive DVT from the right femoral vein to the popliteal vein and  extending into the peroneal and gastrocnemius veins.    MILES OSBORN DO     Assessment and Recommendation: -    # Right lower extremity femoral DVT 11/26/2023-  It is unclear if this was provoked or unprovoked.  He had airplane travel a couple of months prior to having symptoms.  I am less inclined to think of airplane travel as a provoking factor if it has been greater than a month before diagnosis of the venous thromboembolism.  There are no other clear inciting factors.  He is up-to-date on cancer screening, with colonoscopy and PSA checked within the past year.  He has a low level MGUS, with monoclonal peak actually dropping and imaging within the past year that showed no suspicious bone lesions.  He is being followed by pulmonary for lung nodules.  There is no indication to check  for familial thrombophilias, since there is no family history of venous thromboembolism.  He has been checked for antiphospholipid antibodies in the past, before his transplants.  However now that he has a thrombosis, it is reasonable to check cardiolipin and beta-2 glycoprotein 1 antibodies now.  Since he is on it apixaban, he cannot have lupus anticoagulant checked.  If both the beta-2 glycoprotein 1 and cardiolipin antibodies are positive, I be concerned about triple positive antiphospholipid syndrome and he would need to be changed to warfarin.  If negative continue on the apixaban for a total of 3 months.    The apixaban is expensive for him and he is on a lot of medications and would like to be able to get off anticoagulation.  I think it would be reasonable after he has completed 3 months of anticoagulation,~February 26,2024,  to check a repeat leg ultrasound to see if the thrombosis has resolution and to also check a D-dimer.  If there is extensive thrombosis or if the D-dimer is elevated, then I would recommend staying on anticoagulation longer.  If there is significant improvement in the thrombosis and the D-dimer is normal, then it is reasonable to stop the apixaban and continue on just aspirin 81 mg daily.    I will be in contact with him regarding these results, and can decide on whether he needs follow-up at [facility] at that time..    Time: I spent a total of 60 minutes on the day of the visit. Please see the note for further information on patient assessment and treatment.     Jessy Herbert MD  Hematology

## 2024-01-16 LAB
B2 GLYCOPROT1 IGG SERPL IA-ACNC: 1.9 U/ML
B2 GLYCOPROT1 IGM SERPL IA-ACNC: 2.6 U/ML
CARDIOLIPIN IGG SER IA-ACNC: <2 GPL-U/ML
CARDIOLIPIN IGG SER IA-ACNC: NEGATIVE
CARDIOLIPIN IGM SER IA-ACNC: 2.7 MPL-U/ML
CARDIOLIPIN IGM SER IA-ACNC: NEGATIVE

## 2024-01-26 ENCOUNTER — TELEPHONE (OUTPATIENT)
Dept: FAMILY MEDICINE | Facility: CLINIC | Age: 69
End: 2024-01-26
Payer: MEDICARE

## 2024-01-26 NOTE — TELEPHONE ENCOUNTER
Forms/Letter Request    Type of form/letter: Diabetic Supplies Diabetic standard written order      Do we have the form/letter: Yes: place in care team 2 providers form folder    Who is the form from? [facility] [employer] Department (if other please explain)    Where did/will the form come from? form was faxed in    When is form/letter needed by: non given    How would you like the form/letter returned: Fax : [phone]

## 2024-01-29 ENCOUNTER — MYC REFILL (OUTPATIENT)
Dept: TRANSPLANT | Facility: CLINIC | Age: 69
End: 2024-01-29

## 2024-01-29 ENCOUNTER — LAB (OUTPATIENT)
Dept: LAB | Facility: CLINIC | Age: 69
End: 2024-01-29
Payer: MEDICARE

## 2024-01-29 DIAGNOSIS — D84.9 IMMUNOSUPPRESSED STATUS (H): ICD-10-CM

## 2024-01-29 DIAGNOSIS — Z13.220 ENCOUNTER FOR LIPID SCREENING FOR CARDIOVASCULAR DISEASE: ICD-10-CM

## 2024-01-29 DIAGNOSIS — Z13.6 ENCOUNTER FOR LIPID SCREENING FOR CARDIOVASCULAR DISEASE: ICD-10-CM

## 2024-01-29 DIAGNOSIS — Z94.1 HEART REPLACED BY TRANSPLANT (H): ICD-10-CM

## 2024-01-29 DIAGNOSIS — Z94.0 KIDNEY REPLACED BY TRANSPLANT: ICD-10-CM

## 2024-01-29 DIAGNOSIS — Z79.899 ENCOUNTER FOR LONG-TERM (CURRENT) USE OF MEDICATIONS: ICD-10-CM

## 2024-01-29 LAB
ALBUMIN SERPL BCG-MCNC: 4.1 G/DL (ref 3.5–5.2)
ALP SERPL-CCNC: 160 U/L (ref 40–150)
ALT SERPL W P-5'-P-CCNC: 14 U/L (ref 0–70)
ANION GAP SERPL CALCULATED.3IONS-SCNC: 9 MMOL/L (ref 7–15)
AST SERPL W P-5'-P-CCNC: 18 U/L (ref 0–45)
BILIRUB SERPL-MCNC: 0.5 MG/DL
BUN SERPL-MCNC: 15.5 MG/DL (ref 8–23)
CALCIUM SERPL-MCNC: 9.9 MG/DL (ref 8.8–10.2)
CHLORIDE SERPL-SCNC: 103 MMOL/L (ref 98–107)
CHOLEST SERPL-MCNC: 147 MG/DL
CK SERPL-CCNC: 117 U/L (ref 39–308)
CREAT SERPL-MCNC: 1 MG/DL (ref 0.67–1.17)
DEPRECATED HCO3 PLAS-SCNC: 26 MMOL/L (ref 22–29)
EGFRCR SERPLBLD CKD-EPI 2021: 81 ML/MIN/1.73M2
ERYTHROCYTE [DISTWIDTH] IN BLOOD BY AUTOMATED COUNT: 13.2 % (ref 10–15)
FASTING STATUS PATIENT QL REPORTED: ABNORMAL
GLUCOSE SERPL-MCNC: 124 MG/DL (ref 70–99)
HBA1C MFR BLD: 6.7 %
HCT VFR BLD AUTO: 41.2 % (ref 40–53)
HDLC SERPL-MCNC: 47 MG/DL
HGB BLD-MCNC: 13.5 G/DL (ref 13.3–17.7)
LDLC SERPL CALC-MCNC: 50 MG/DL
MAGNESIUM SERPL-MCNC: 1.7 MG/DL (ref 1.7–2.3)
MCH RBC QN AUTO: 29.4 PG (ref 26.5–33)
MCHC RBC AUTO-ENTMCNC: 32.8 G/DL (ref 31.5–36.5)
MCV RBC AUTO: 90 FL (ref 78–100)
NONHDLC SERPL-MCNC: 100 MG/DL
PHOSPHATE SERPL-MCNC: 2.8 MG/DL (ref 2.5–4.5)
PLATELET # BLD AUTO: 221 10E3/UL (ref 150–450)
POTASSIUM SERPL-SCNC: 4.3 MMOL/L (ref 3.4–5.3)
PROT SERPL-MCNC: 7.2 G/DL (ref 6.4–8.3)
RBC # BLD AUTO: 4.59 10E6/UL (ref 4.4–5.9)
SODIUM SERPL-SCNC: 138 MMOL/L (ref 135–145)
TACROLIMUS BLD-MCNC: 5.2 UG/L (ref 5–15)
TME LAST DOSE: NORMAL H
TME LAST DOSE: NORMAL H
TRIGL SERPL-MCNC: 248 MG/DL
WBC # BLD AUTO: 4.4 10E3/UL (ref 4–11)

## 2024-01-29 PROCEDURE — 36415 COLL VENOUS BLD VENIPUNCTURE: CPT | Performed by: PATHOLOGY

## 2024-01-29 PROCEDURE — 83735 ASSAY OF MAGNESIUM: CPT | Performed by: PATHOLOGY

## 2024-01-29 PROCEDURE — 99000 SPECIMEN HANDLING OFFICE-LAB: CPT | Performed by: PATHOLOGY

## 2024-01-29 PROCEDURE — 80053 COMPREHEN METABOLIC PANEL: CPT | Performed by: PATHOLOGY

## 2024-01-29 PROCEDURE — 84100 ASSAY OF PHOSPHORUS: CPT | Performed by: PATHOLOGY

## 2024-01-29 PROCEDURE — 83036 HEMOGLOBIN GLYCOSYLATED A1C: CPT | Performed by: INTERNAL MEDICINE

## 2024-01-29 PROCEDURE — 80197 ASSAY OF TACROLIMUS: CPT | Performed by: INTERNAL MEDICINE

## 2024-01-29 PROCEDURE — 80061 LIPID PANEL: CPT | Performed by: PATHOLOGY

## 2024-01-29 PROCEDURE — 85027 COMPLETE CBC AUTOMATED: CPT | Performed by: PATHOLOGY

## 2024-01-29 PROCEDURE — 82550 ASSAY OF CK (CPK): CPT | Performed by: PATHOLOGY

## 2024-01-29 RX ORDER — SULFAMETHOXAZOLE AND TRIMETHOPRIM 400; 80 MG/1; MG/1
1 TABLET ORAL EVERY MORNING
Qty: 90 TABLET | Refills: 3 | Status: SHIPPED | OUTPATIENT
Start: 2024-01-29 | End: 2024-04-26

## 2024-02-02 DIAGNOSIS — E11.22 TYPE 2 DIABETES MELLITUS WITH CHRONIC KIDNEY DISEASE, WITHOUT LONG-TERM CURRENT USE OF INSULIN, UNSPECIFIED CKD STAGE (H): ICD-10-CM

## 2024-02-05 ENCOUNTER — PATIENT OUTREACH (OUTPATIENT)
Dept: CARE COORDINATION | Facility: CLINIC | Age: 69
End: 2024-02-05
Payer: MEDICARE

## 2024-02-06 NOTE — TELEPHONE ENCOUNTER
empagliflozin (JARDIANCE) 10 MG TABS tablet 90 tablet 1 8/11/2023       Last Office Visit: 11/3/23  Future Office visit:   5/1/24    Sodium Glucose Co-Transport Inhibitor Agents Passed        Lauren Villegas RN  P Red Flag Triage/MRT

## 2024-02-13 ENCOUNTER — TELEPHONE (OUTPATIENT)
Dept: TRANSPLANT | Facility: CLINIC | Age: 69
End: 2024-02-13
Payer: MEDICARE

## 2024-02-13 DIAGNOSIS — Z94.1 HEART REPLACED BY TRANSPLANT (H): Primary | ICD-10-CM

## 2024-02-13 NOTE — TELEPHONE ENCOUNTER
Cath Lab Case Request/Order    Location: [address] [address] [facility] Waiting Room    Procedure: Coronary Angiogram w/ RHC    Procedure Date: 0612/24    Patient Arrival Time: 10:30 AM    Procedure Time: 3rd case to follow    Ordering Provider: Dr. Klever Ernst    Performing Cardiologist:  VENUS    Inpatient Bed Needed: No    Post-  Procedure EARL appointment scheduled (1 - 2 weeks): NO      Communicated Patient Instructions:     NPO, nothing to eat 8 hours and drink 2 hours before arrival time: No     , need to arrange a ride home - unable to drive post- procedure: No     Adult at home, need a responsible adult to stay with patient 24 hours post- procedure: NO    Appointment was scheduled: Winifred    Patient expressed understanding of above instructions and denied further questions at this time.    Colette Markham

## 2024-02-15 ENCOUNTER — ANCILLARY PROCEDURE (OUTPATIENT)
Dept: CT IMAGING | Facility: CLINIC | Age: 69
End: 2024-02-15
Attending: STUDENT IN AN ORGANIZED HEALTH CARE EDUCATION/TRAINING PROGRAM
Payer: MEDICARE

## 2024-02-15 DIAGNOSIS — R91.1 LUNG NODULE: ICD-10-CM

## 2024-02-15 DIAGNOSIS — Z94.1 HEART REPLACED BY TRANSPLANT (H): ICD-10-CM

## 2024-02-15 PROCEDURE — 71250 CT THORAX DX C-: CPT | Mod: ME | Performed by: RADIOLOGY

## 2024-02-15 PROCEDURE — G1010 CDSM STANSON: HCPCS | Performed by: RADIOLOGY

## 2024-02-19 ENCOUNTER — PATIENT OUTREACH (OUTPATIENT)
Dept: CARE COORDINATION | Facility: CLINIC | Age: 69
End: 2024-02-19
Payer: MEDICARE

## 2024-02-22 ENCOUNTER — ONCOLOGY VISIT (OUTPATIENT)
Dept: PULMONOLOGY | Facility: CLINIC | Age: 69
End: 2024-02-22
Attending: STUDENT IN AN ORGANIZED HEALTH CARE EDUCATION/TRAINING PROGRAM
Payer: MEDICARE

## 2024-02-22 VITALS
HEART RATE: 94 BPM | WEIGHT: 176 LBS | OXYGEN SATURATION: 100 % | TEMPERATURE: 97.9 F | RESPIRATION RATE: 16 BRPM | SYSTOLIC BLOOD PRESSURE: 116 MMHG | DIASTOLIC BLOOD PRESSURE: 76 MMHG | BODY MASS INDEX: 26.76 KG/M2

## 2024-02-22 DIAGNOSIS — R91.1 LUNG NODULE: Primary | ICD-10-CM

## 2024-02-22 PROCEDURE — G0463 HOSPITAL OUTPT CLINIC VISIT: HCPCS | Performed by: STUDENT IN AN ORGANIZED HEALTH CARE EDUCATION/TRAINING PROGRAM

## 2024-02-22 PROCEDURE — 99214 OFFICE O/P EST MOD 30 MIN: CPT | Performed by: STUDENT IN AN ORGANIZED HEALTH CARE EDUCATION/TRAINING PROGRAM

## 2024-02-22 ASSESSMENT — PAIN SCALES - GENERAL: PAINLEVEL: NO PAIN (0)

## 2024-02-22 NOTE — PROGRESS NOTES
LUNG NODULE & INTERVENTIONAL PULMONARY CLINIC  [facility]    Murray Nicholson MRN# [id number]   Age: 69 year old YOB: 1955     Reason for Consultation: Lung nodule    Requesting Physician: Alejandro Murguia DO  [address] , [facility] [address]  [place] [address]     Assessment and Plan:    Murray Nicholson is a 69 year old male who presents for evaluation of a lung nodule.    I reviewed their CT scan from 2/15/24 which reveals a stable ground glass 8mm LLL and 5mm left apical lung nodule.     No indication to biopsy at this time.     Will plan on repeat Ct in 6 months with follow up.       Patient indicated understanding and agreed to the plan of care. All questions answered.     Alejandro Murguia DO   of Medicine  Interventional Pulmonology  Department of Pulmonary, Allergy, Critical Care and Sleep Medicine   [facility]     History:  Murray Nicholson is a 69 year old male who presents for follow up evaluation of lung nodules.   He was diagnosed with a blood clot 3 months ago in his leg and is on AC for this.   Otherwise no changes in his health.   No fevers, or chills. No cough.     Medications:  Current Outpatient Medications   Medication Sig    alcohol swab prep pads Use to swab area of injection/davida as directed.    amLODIPine (NORVASC) 5 MG tablet Take 1 tablet (5 mg) by mouth daily    apixaban ANTICOAGULANT (ELIQUIS) 5 MG tablet Take 1 tablet (5 mg) by mouth 2 times daily    aspirin 81 MG EC tablet Take 81 mg by mouth every evening    atorvastatin (LIPITOR) 40 MG tablet Take 1 tablet (40 mg) by mouth every evening    blood glucose (ACCU-CHEK GUIDE) test strip Use to test blood sugar 3 times daily or as directed.    blood glucose (ACCU-CHEK SOFTCLIX) lancing device Lancing device to be used with lancets. Test blood sugar 3 times daily or as directed    blood glucose (NO BRAND SPECIFIED) test strip Use to test blood sugar 3 times daily or as  directed. Any covered brand that works with meter.    blood glucose monitoring (ACCU-CHEK FASTCLIX) lancets USE TO TEST 3 TIMES DAILY OR AS DIRECTED.    blood glucose monitoring (SOFTCLIX) lancets Use to test blood sugar 3 times daily.    Blood Glucose Monitoring Suppl (ACCU-CHEK GUIDE) w/Device KIT USE TO TEST BLOOD SUGAR THREE TIMES DAILY AS DIRECTED.    Calcium Polycarbophil (FIBER) 625 MG tablet Take by mouth every morning    empagliflozin (JARDIANCE) 10 MG TABS tablet Take 1 tablet (10 mg) by mouth daily    loratadine (CLARITIN) 10 MG tablet Take 10 mg by mouth every evening Patient takes at bedtime    magnesium oxide (MAG-OX) 400 MG tablet Take 1 tablet (400 mg) by mouth 2 times daily    metFORMIN (GLUCOPHAGE XR) 500 MG 24 hr tablet Take 4 tabs every AM    Multiple Vitamins-Minerals (HAIR SKIN NAILS PO) Take by mouth every morning    mycophenolate (GENERIC EQUIVALENT) 250 MG capsule Take 2 capsules (500 mg) by mouth 2 times daily    OMEPRAZOLE PO Take 20 mg by mouth every morning    sulfamethoxazole-trimethoprim (BACTRIM) 400-80 MG tablet Take 1 tablet by mouth every morning    tacrolimus (GENERIC EQUIVALENT) 0.5 MG capsule Take 1 capsule (0.5 mg) by mouth 2 times daily    tacrolimus (GENERIC EQUIVALENT) 1 MG capsule ON HOLD due to dose change    thin (NO BRAND SPECIFIED) lancets Use with lanceting device 3x daily. Any covered brand that works with lancing device.     Current Facility-Administered Medications   Medication    cilgavimab 300 mg/tixagevimab 300 mg (EVUSHELD) - FULL DOSE     Facility-Administered Medications Ordered in Other Visits   Medication    diatrizoate meglumine-sodium (GASTROGRAFIN/GASTROVIEW) 66-10 % solution 480 mL         Physical exam:  /76   Pulse 94   Temp 97.9  F (36.6  C)   Resp 16   Wt 79.8 kg (176 lb)   SpO2 100%   BMI 26.76 kg/m    Wt Readings from Last 4 Encounters:   02/22/24 79.8 kg (176 lb)   01/15/24 80.3 kg (177 lb)   11/27/23 78.8 kg (173 lb 12.8 oz)    11/26/23 80.6 kg (177 lb 9.6 oz)     General: Well appearing, nonlabored breathing  Neuro: Answering questions appropriately  Psych: Normal affect

## 2024-02-25 NOTE — OR NURSING
EUS with FNA done in OR 24 with Dr. Don. Specimens given to OR RN and sedation per anesthesia.   25-Feb-2024

## 2024-02-26 ENCOUNTER — LAB (OUTPATIENT)
Dept: LAB | Facility: CLINIC | Age: 69
End: 2024-02-26
Payer: MEDICARE

## 2024-02-26 ENCOUNTER — ANCILLARY PROCEDURE (OUTPATIENT)
Dept: ULTRASOUND IMAGING | Facility: CLINIC | Age: 69
End: 2024-02-26
Attending: INTERNAL MEDICINE
Payer: MEDICARE

## 2024-02-26 ENCOUNTER — TELEPHONE (OUTPATIENT)
Dept: TRANSPLANT | Facility: CLINIC | Age: 69
End: 2024-02-26

## 2024-02-26 DIAGNOSIS — I82.4Y1 ACUTE DEEP VEIN THROMBOSIS (DVT) OF PROXIMAL VEIN OF RIGHT LOWER EXTREMITY (H): ICD-10-CM

## 2024-02-26 DIAGNOSIS — Z94.0 KIDNEY REPLACED BY TRANSPLANT: ICD-10-CM

## 2024-02-26 LAB
ANION GAP SERPL CALCULATED.3IONS-SCNC: 10 MMOL/L (ref 7–15)
BUN SERPL-MCNC: 16.7 MG/DL (ref 8–23)
CALCIUM SERPL-MCNC: 10.2 MG/DL (ref 8.8–10.2)
CHLORIDE SERPL-SCNC: 104 MMOL/L (ref 98–107)
CREAT SERPL-MCNC: 1.2 MG/DL (ref 0.67–1.17)
D DIMER PPP FEU-MCNC: 0.28 UG/ML FEU (ref 0–0.5)
DEPRECATED HCO3 PLAS-SCNC: 26 MMOL/L (ref 22–29)
EGFRCR SERPLBLD CKD-EPI 2021: 65 ML/MIN/1.73M2
ERYTHROCYTE [DISTWIDTH] IN BLOOD BY AUTOMATED COUNT: 13.4 % (ref 10–15)
GLUCOSE SERPL-MCNC: 161 MG/DL (ref 70–99)
HCT VFR BLD AUTO: 43.5 % (ref 40–53)
HGB BLD-MCNC: 14.1 G/DL (ref 13.3–17.7)
MCH RBC QN AUTO: 29.3 PG (ref 26.5–33)
MCHC RBC AUTO-ENTMCNC: 32.4 G/DL (ref 31.5–36.5)
MCV RBC AUTO: 90 FL (ref 78–100)
PLATELET # BLD AUTO: 224 10E3/UL (ref 150–450)
POTASSIUM SERPL-SCNC: 4.2 MMOL/L (ref 3.4–5.3)
RBC # BLD AUTO: 4.82 10E6/UL (ref 4.4–5.9)
SODIUM SERPL-SCNC: 140 MMOL/L (ref 135–145)
TACROLIMUS BLD-MCNC: 4 UG/L (ref 5–15)
TME LAST DOSE: ABNORMAL H
TME LAST DOSE: ABNORMAL H
WBC # BLD AUTO: 4.3 10E3/UL (ref 4–11)

## 2024-02-26 PROCEDURE — 85379 FIBRIN DEGRADATION QUANT: CPT | Performed by: INTERNAL MEDICINE

## 2024-02-26 PROCEDURE — 85027 COMPLETE CBC AUTOMATED: CPT | Performed by: PATHOLOGY

## 2024-02-26 PROCEDURE — 99000 SPECIMEN HANDLING OFFICE-LAB: CPT | Performed by: PATHOLOGY

## 2024-02-26 PROCEDURE — 80048 BASIC METABOLIC PNL TOTAL CA: CPT | Performed by: INTERNAL MEDICINE

## 2024-02-26 PROCEDURE — 93971 EXTREMITY STUDY: CPT | Mod: RT | Performed by: RADIOLOGY

## 2024-02-26 PROCEDURE — 36415 COLL VENOUS BLD VENIPUNCTURE: CPT | Performed by: PATHOLOGY

## 2024-02-26 PROCEDURE — 80197 ASSAY OF TACROLIMUS: CPT | Performed by: INTERNAL MEDICINE

## 2024-02-26 NOTE — TELEPHONE ENCOUNTER
ISSUE: elevated creatinine: 1.2, up from ~1.0 though is at the top of his baseline of 1-1.2    PLAN:  Call and assess hydration status.  How much water is he drinking per day?  Any recent illness, diarrhea, s/s of a UTI, or medication changes?  Any increased intake of alcohol or caffeine?  Recommend increasing hydration and repeating labs within 1 week.    Patient states he is feeling well.  Denies recent illness or s/s UTI.    Will ensure he is hydrating and will repeat labs in 3 months when due.    Colette Martin RN   Transplant Coordinator  [phone]

## 2024-02-27 NOTE — RESULT ENCOUNTER NOTE
Hello -    Here are my comments about the recent results: The venous blood clots are much improved, but not totally gone. The d-dimer test is normal. All of this is good, it is common for there to be a small amount of remaining clot, and that does not mean you have to stay on anticoagulation. I recommend you stop the Eliquis. Continue on aspirin. You do  not need routine follow up at the [facility]. Please contact us if questions.     Regards,   Jessy Herbert MD

## 2024-03-06 NOTE — PLAN OF CARE
March 7, 2024         No Recipients      Patient: Zee Russell   YOB: 1957   Date of Visit: 3/6/2024       Dear Dr. Parkinson Recipients:    Thank you for referring Zee Russell to me for evaluation. Below are my notes for this visit with her.    If you have questions, please do not hesitate to call me. I look forward to following your patient along with you.      Sincerely,        Vidhi Reveles MD        CC:   No Recipients  Vidhi Reveles MD  3/6/2024 11:17 AM  Addendum  ACP was discuss during the visit        Subjective  Patient ID: Zee is a 67 year old female.    Chief Complaint   Patient presents with    Office Visit     Cardiology    Follow-up     Last visit 12/27/23.    Hospital F/U     [facility] for heart surgery 1/31/24. Pt had labs 1/31/24.         HPI:   ====  Patient doing well, and report  No Chest pain and no angina   Dyspnea is stable  No PND and no orthopnea  No Loss of consciousness  No Dizziness   Edema stable  No Palpitation       Patient is Tolerating all medications,no side effects .  Including anticoagulation    Past Medical History:   Diagnosis Date    Anemia     Arthritis     Cardiomyopathy (CMD)     CHF (congestive heart failure) (CMD)     CKD (chronic kidney disease)     ckd    Gout     Hyperlipidemia     Hypertension     Malignant neoplasm (CMD)     cervix    Obesity     Personal history of kidney stones     Sciatic pain     Sleep apnea     No CPAP    Type 2 diabetes mellitus (CMD)        ALLERGIES:  No Known Allergies     Past Surgical History:   Procedure Laterality Date    Av fistula placement, brachiobasilic Left     Stage 1    Dilation and curettage  1970's    miscarriage     Eye surgery Bilateral 2020    cataract surgery    Hysterectomy      real, bso 2018    Kidney stone surgery  2012    two episodes     Past surgical history      No recent surgery       Family History   Problem Relation Age of Onset    Diabetes Mother     Heart disease Father      Pt arrived to the floor from cath lab to 6D. Tele notified of pt arrival.    Diabetes Sister     Diabetes Brother     Heart disease Maternal Uncle     Diabetes Maternal Grandfather     Diabetes Paternal Grandmother     Diabetes Sister     Heart disease Sister        Social History     Tobacco Use    Smoking status: Never    Smokeless tobacco: Never   Vaping Use    Vaping Use: never used   Substance Use Topics    Alcohol use: Never    Drug use: Never        Recent hospitalization:  ====================   None     Review of Systems:   ================     Constitutional:  No chills, and no malaise  Ears, nose, mouth, throat, and face:  No pain, no swelling  Eyes:  No change in eyesight, no pain  Cardiovascular:  No Loss of consciousness  Respiratory:  No hemoptysis  Gastrointestinal:  No melena  Genitourinary:  No hematuria  Neurological:  No change in speech  Musculoskeletal:  No muscle pain  Behavioral/Psych:  No mood changes  Skin : no itching    =============================================================================    Objective  =========  Vitals:    03/06/24 0925   BP: 107/61   BP Location: LUE - Left upper extremity   Patient Position: Sitting   Cuff Size: Regular   Pulse: 78   Temp: 97.6 °F (36.4 °C)   TempSrc: Temporal   SpO2: 99%   Weight: 114.4 kg (252 lb 5.1 oz)   Height: 5' 4\" (1.626 m)   PainSc:  0        Physical Exam:  =============  General: Alert, cooperative, no distress.  Obese  Head: No obvious abnormality  Eyes: Normal, no jaundice.  Throat: Lips, mucosa, moist and normal.  Neck: Supple.  Back: Symmetric.  Lungs: Clear, no wheezing, no rhonchi and no rales.  Heart: Regular rate and rhythm, S1, S2 normal.  Abdomen: Soft, non-tender.  Extremities: No edema.  Skin: Normal texture  Neurologic: No clear deficit.    =============================================================================    Data  =====    No results for input(s): \"CHOLESTEROL\", \"HDL\", \"TRIGLYCERIDE\", \"CALCLDL\", \"LDLDIR\", \"NONHDL\" in the last 8765 hours.    No results for input(s): \"SODIUM\",  \"CHLORIDE\", \"BUN\", \"GFRA\", \"BCRAT\", \"POTASSIUM\", \"C02\", \"GLUCOSE\", \"CREATININE\", \"GFRNA\", \"CALCIUM\", \"BNP\", \"TSH\" in the last 8765 hours.    Invalid input(s): \"AGAP\", \"FST1\"         Hemoglobin A1C (%)   Date Value   07/19/2021 6.7 (H)   .    No results for input(s): \"WBC\", \"RBC\", \"HGB\", \"HCT\", \"MCV\", \"MCHC\", \"RDWCV\", \"PLT\", \"TLYMPH\" in the last 8765 hours.         ECHO  [facility]  09/2023  --------------------   The left ventricle is mildly dilated.   Left ventricular performance is mild-moderately reduced.   LV dysfunction is global with regional variation.   Abnormal septal motion is consistent with conduction abnormality.   No apical thrombus noted after injection of echocardiographic contrast.   Right ventricular performance is normal   The left atrium is severely dilated.   The IVC is normal sized with normal respirophasic variation (estimated right atrial   pressure 3 mmHg).   There is mild mitral regurgitation.          EKG   =======  Normal sinus rhythm  Left axis deviation  LVH  Poor R progression  Lateral ischemia     ==============================================================================     Assessment :  ============    No diagnosis found.    Plan:  =====      Atrial fibrillation paroxysmal  ===================  Asymptomatic  Eliquis 5 twice daily  Patient instructed to keep monitoring urine and stool color  CBC CMP         Chronic systolic and diastolic CHF  --------------------  Symptoms are stable   Patient receiving hemodialysis 3 times a week,   Continue beta-blocker  Continue losartan            End-stage renal disease  --------------------------   hemodialysis      Morbid obesity  ---------------   diet and exercise      Hyperlipidemia  -------------------  Low-cholesterol diet  Continue with Statin  Check lipid profile      Diabetes  ------------  Low-calorie diet  Low carbohydrate diet   check A1c 6.3 02/2023    Check A1c    Gout  ========  Well-controlled no recent attacks                  Current Outpatient Medications   Medication Sig Dispense Refill    midodrine (PROAMATINE) 5 MG tablet Take 5 mg by mouth.      losartan (COZAAR) 25 MG tablet Take 25 mg by mouth daily.      metoPROLOL succinate (TOPROL-XL) 25 MG 24 hr tablet Take 25 mg by mouth daily.      apixaBAN (Eliquis) 5 MG Tab Take 1 tablet by mouth every 12 hours. 180 tablet 3    allopurinol (ZYLOPRIM) 100 MG tablet Take 1 tablet by mouth in the morning and 1 tablet in the evening. 180 tablet 3    gabapentin (NEURONTIN) 300 MG capsule Take 1 capsule by mouth in the morning and 1 capsule at noon and 1 capsule in the evening. 60 capsule 3    atorvastatin (LIPITOR) 20 MG tablet       polyethylene glycol (MIRALAX) 17 GM/SCOOP powder Take 17 g by mouth.      epoetin navya-epbx (Retacrit) 79829 UNIT/ML injection 10,000 Units.      cinacalcet (SENSIPAR) 30 MG tablet Take 30 mg by mouth daily.      Cholecalciferol (Vitamin D3) 50 mcg (2,000 units) tablet Take by mouth daily.       No current facility-administered medications for this visit.          Electronically signed by:  Vidhi Reveles MD, 3/6/2024

## 2024-03-07 ENCOUNTER — MYC MEDICAL ADVICE (OUTPATIENT)
Dept: FAMILY MEDICINE | Facility: CLINIC | Age: 69
End: 2024-03-07
Payer: MEDICARE

## 2024-03-07 DIAGNOSIS — M25.561 BILATERAL KNEE PAIN: ICD-10-CM

## 2024-03-07 DIAGNOSIS — M25.561 ACUTE PAIN OF BOTH KNEES: ICD-10-CM

## 2024-03-07 DIAGNOSIS — M25.562 LEFT KNEE PAIN: Primary | ICD-10-CM

## 2024-03-07 DIAGNOSIS — M25.562 BILATERAL KNEE PAIN: ICD-10-CM

## 2024-03-07 DIAGNOSIS — M25.562 ACUTE PAIN OF BOTH KNEES: ICD-10-CM

## 2024-03-08 NOTE — PROGRESS NOTES
"Murray Nicholson came to [facility] today for a lab and assess following a kidney transplant on 12/19/19.      Discharge date: 12/22/19  Transplant coordinator: Jennifer  Phone number patient can be reached at: [phone]      Physical Assessment:  See physical assessment located under \"Document Flowsheets\".  Incision site: staples  Lines: KAI drain  Urine clarity: clear yellow per pt  Hydration: pt reports drinking at least 2L fluid daily  Nutrition: good appetite; no nausea   Last BM: yesterday  Pain: incisional; taking prn oxycodone/tylenol   Labs drawn by [facility] staff Yes    Plan of care for today: Pt and vitals assessed. Pt reports doing well. Kai drain with only 10-15 ml daily output. Will contact surgery today about removing. Pt was seen by nephrology today. Dr. Quijano saw pt and reviewed labs. No new medication orders or changes. Pt given ok to discharge to home and start homecare.  Rocky Dwyer (kidney surgery fellow) came and removed pt's KAI drain without difficulty. Pt given supplies for dressing changes at home if any leaking should occur.     Medication changes: none today    Medications administered:      Patient education:    The following teaching topics were addressed: Importance of drinking 2L of non-caffeinated fluids daily and Plan of care   Patient verbalized understanding and all questions answered.    Drug level:  Tacrolimus level today reviewed with Dr Quijano who gave orders to decrease dose to 4mg BID.  Patient was updated with this information and verbalized understanding.    Face to face time: 20  Discharge Plan    Pt will follow up with labs with homecare starting tomorrow.   Discharge instructions reviewed with patient: YES  Patient/Representative verbalized understanding, all questions answered: YES    Discharged from unit at 1015 with whom: friend to home.    Ana Luisa Mcmahon, RN, RN   " No Assistant

## 2024-03-08 NOTE — TELEPHONE ENCOUNTER
Alicia: pended sports med referral if he prefers.  I do not see that he is due for a COVID booster.    Lately I have had some recurring pain in both my left and right knees. both on the interior sides.   Should i see someone?   Oh  and let me know when i should get my new Covid shot.    Mago NELSON RN  [facility]

## 2024-03-28 ENCOUNTER — MYC REFILL (OUTPATIENT)
Dept: ENDOCRINOLOGY | Facility: CLINIC | Age: 69
End: 2024-03-28
Payer: MEDICARE

## 2024-03-28 ENCOUNTER — TELEPHONE (OUTPATIENT)
Dept: TRANSPLANT | Facility: CLINIC | Age: 69
End: 2024-03-28
Payer: MEDICARE

## 2024-03-28 DIAGNOSIS — Z94.1 HEART REPLACED BY TRANSPLANT (H): Primary | ICD-10-CM

## 2024-03-28 DIAGNOSIS — E11.29 TYPE 2 DIABETES MELLITUS WITH OTHER DIABETIC KIDNEY COMPLICATION, WITHOUT LONG-TERM CURRENT USE OF INSULIN (H): ICD-10-CM

## 2024-03-28 RX ORDER — METFORMIN HCL 500 MG
TABLET, EXTENDED RELEASE 24 HR ORAL
Qty: 360 TABLET | Refills: 1 | Status: SHIPPED | OUTPATIENT
Start: 2024-03-28 | End: 2024-09-19

## 2024-03-28 RX ORDER — MYCOPHENOLATE MOFETIL 250 MG/1
500 CAPSULE ORAL 2 TIMES DAILY
Qty: 120 CAPSULE | Refills: 0 | Status: SHIPPED | OUTPATIENT
Start: 2024-03-28 | End: 2024-04-26

## 2024-03-28 ASSESSMENT — ACTIVITIES OF DAILY LIVING (ADL)
HOW_WOULD_YOU_RATE_THE_CURRENT_FUNCTION_OF_YOUR_KNEE_DURING_YOUR_USUAL_DAILY_ACTIVITIES_ON_A_SCALE_FROM_0_TO_100_WITH_100_BEING_YOUR_LEVEL_OF_KNEE_FUNCTION_PRIOR_TO_YOUR_INJURY_AND_0_BEING_THE_INABILITY_TO_PERFORM_ANY_OF_YOUR_USUAL_DAILY_ACTIVITIES?: 50
LIMPING: I HAVE THE SYMPTOM BUT IT DOES NOT AFFECT MY ACTIVITY
AS_A_RESULT_OF_YOUR_KNEE_INJURY,_HOW_WOULD_YOU_RATE_YOUR_CURRENT_LEVEL_OF_DAILY_ACTIVITY?: NORMAL
GIVING WAY, BUCKLING OR SHIFTING OF KNEE: I DO NOT HAVE THE SYMPTOM
KNEE_ACTIVITY_OF_DAILY_LIVING_SCORE: 81.43
RISE FROM A CHAIR: ACTIVITY IS MINIMALLY DIFFICULT
WALK: ACTIVITY IS MINIMALLY DIFFICULT
GO DOWN STAIRS: ACTIVITY IS MINIMALLY DIFFICULT
WEAKNESS: I HAVE THE SYMPTOM BUT IT DOES NOT AFFECT MY ACTIVITY
RAW_SCORE: 57
RISE FROM A CHAIR: ACTIVITY IS MINIMALLY DIFFICULT
GO DOWN STAIRS: ACTIVITY IS MINIMALLY DIFFICULT
GIVING WAY, BUCKLING OR SHIFTING OF KNEE: I DO NOT HAVE THE SYMPTOM
PAIN: I HAVE THE SYMPTOM BUT IT DOES NOT AFFECT MY ACTIVITY
KNEEL ON THE FRONT OF YOUR KNEE: ACTIVITY IS SOMEWHAT DIFFICULT
STIFFNESS: I HAVE THE SYMPTOM BUT IT DOES NOT AFFECT MY ACTIVITY
GO UP STAIRS: ACTIVITY IS MINIMALLY DIFFICULT
SQUAT: ACTIVITY IS SOMEWHAT DIFFICULT
PAIN: I HAVE THE SYMPTOM BUT IT DOES NOT AFFECT MY ACTIVITY
SWELLING: I DO NOT HAVE THE SYMPTOM
HOW_WOULD_YOU_RATE_THE_CURRENT_FUNCTION_OF_YOUR_KNEE_DURING_YOUR_USUAL_DAILY_ACTIVITIES_ON_A_SCALE_FROM_0_TO_100_WITH_100_BEING_YOUR_LEVEL_OF_KNEE_FUNCTION_PRIOR_TO_YOUR_INJURY_AND_0_BEING_THE_INABILITY_TO_PERFORM_ANY_OF_YOUR_USUAL_DAILY_ACTIVITIES?: 50
WEAKNESS: I HAVE THE SYMPTOM BUT IT DOES NOT AFFECT MY ACTIVITY
SIT WITH YOUR KNEE BENT: ACTIVITY IS NOT DIFFICULT
AS_A_RESULT_OF_YOUR_KNEE_INJURY,_HOW_WOULD_YOU_RATE_YOUR_CURRENT_LEVEL_OF_DAILY_ACTIVITY?: NORMAL
HOW_WOULD_YOU_RATE_THE_OVERALL_FUNCTION_OF_YOUR_KNEE_DURING_YOUR_USUAL_DAILY_ACTIVITIES?: ABNORMAL
KNEE_ACTIVITY_OF_DAILY_LIVING_SUM: 57
SWELLING: I DO NOT HAVE THE SYMPTOM
SQUAT: ACTIVITY IS SOMEWHAT DIFFICULT
STAND: ACTIVITY IS MINIMALLY DIFFICULT
GO UP STAIRS: ACTIVITY IS MINIMALLY DIFFICULT
HOW_WOULD_YOU_RATE_THE_OVERALL_FUNCTION_OF_YOUR_KNEE_DURING_YOUR_USUAL_DAILY_ACTIVITIES?: ABNORMAL
WALK: ACTIVITY IS MINIMALLY DIFFICULT
STAND: ACTIVITY IS MINIMALLY DIFFICULT
LIMPING: I HAVE THE SYMPTOM BUT IT DOES NOT AFFECT MY ACTIVITY
KNEEL ON THE FRONT OF YOUR KNEE: ACTIVITY IS SOMEWHAT DIFFICULT
STIFFNESS: I HAVE THE SYMPTOM BUT IT DOES NOT AFFECT MY ACTIVITY
PLEASE_INDICATE_YOR_PRIMARY_REASON_FOR_REFERRAL_TO_THERAPY:: KNEE
SIT WITH YOUR KNEE BENT: ACTIVITY IS NOT DIFFICULT
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
## 2024-03-29 ENCOUNTER — TELEPHONE (OUTPATIENT)
Dept: CARDIOLOGY | Facility: CLINIC | Age: 69
End: 2024-03-29
Payer: MEDICARE

## 2024-03-29 NOTE — TELEPHONE ENCOUNTER
[facility] Prior Authorization Team   Phone: [phone]  Fax: [phone]    PA Initiation    Medication: MYCOPHENOLATE MOFETIL (GENERIC EQUIV) 250 MG PO CAPS  Insurance Company: [employer] - Phone [phone] Fax [phone]  Pharmacy Filling the Rx: [facility] - [place], [place] - [facility] [address]  Filling Pharmacy Phone: [phone]  Filling Pharmacy Fax: [phone]  Start Date: 3/29/2024

## 2024-04-01 ENCOUNTER — TELEPHONE (OUTPATIENT)
Dept: FAMILY MEDICINE | Facility: CLINIC | Age: 69
End: 2024-04-01
Payer: MEDICARE

## 2024-04-01 NOTE — TELEPHONE ENCOUNTER
Alicia - we are following up on a backlog of Zio patch orders. This patient had an order from 12/4/23 - is actively followed by cards and pulm and is scheduled for coronary angiogram 6/12/24. Is this Zio patch still a beneficial order for the patient (if so, we will facilitate order conversion) or can this order be cancelled?    REMI CotoN, RN  [facility]

## 2024-04-01 NOTE — TELEPHONE ENCOUNTER
Prior Authorization Not Needed per Insurance    Medication: MYCOPHENOLATE MOFETIL (GENERIC EQUIV) 250 MG PO CAPS  Insurance Company: [employer] - Phone [phone] Fax [phone]  Expected CoPay: $    Pharmacy Filling the Rx: [facility] - [place], [place] - [address]  Pharmacy Notified: Yes  Patient Notified: Yes

## 2024-04-02 NOTE — PROGRESS NOTES
PHYSICAL THERAPY EVALUATION  Type of Visit: Evaluation    See electronic medical record for Abuse and Falls Screening details.    Subjective       Presenting condition or subjective complaint: It is unusual for me to have Knee pain (r knee) and i want to nip it in the bud.  Date of onset: 03/07/24 (date of PT referral)    Relevant medical history: DVT (blood clot); Hearing problems; High blood pressure; Kidney disease   Dates & types of surgery: New heart 6/18 new Kidney 9/19 also 2 henias and colon excision w/ colostomy and iliasctomy    Prior diagnostic imaging/testing results:       Prior therapy history for the same diagnosis, illness or injury: No      Living Environment  Social support: Alone   Type of home: Apartment/condo   Stairs to enter the home: Yes 69 Is there a railing: Yes   Ramp: No   Stairs inside the home: No       Help at home: None  Equipment owned:       Employment: Yes retail sales  Hobbies/Interests: Hiking , reading    Patient goals for therapy: find out how to strengthen my knee and find out if there is some deterioration    Murray Nicholson is a 69 year old male with a bilateral knee condition. Mechanism of injury: No specific injury but starting to work out more and noticing pain in anterior knees - right worse than left. Where: (home, work, MVA, community, recreation/sport, unknown, other): NA. Location of symptoms: anterior, medial. Pain level on number scale: 4/10. Quality of pain: dull ache. Associated symptoms: None. Pain frequency (constant/intermittent): intermittent. Symptoms are exacerbated by: squatting, stairs, kneeling, sit<>stand, floor<>stand. Symptoms are relieved by: pain does not elevate to level which requires reduction at this time. Progression of symptoms since onset (same/better/worse): same. Special tests (x-ray, MRI, CT scan, EMG, bone scan): None. Previous treatment: None. Improvement with previous treatment: NA. General health as reported by patient is good.  Pertinent medical history includes:  see Epic. Medical allergies includes: see Epic. Surgical history includes: see Epic. Current medications include: see Epic. Occupation: retail sales. Patient is (working in normal job without restrictions/working in normal job with restrictions/working in an alternate job/not working due to present treatment problem): working in normal job. Primary job tasks: repetitive tasks. Barriers at home/work: None reported by patient. Red flags: None reported by patient.     Objective   Gait:  Normal    Single leg squat: poor mechanics, weak quad and hip control, anterior knee excursion  SLS, firm, EC:  < 3 seconds bilaterally    Knee AROM (* = pain) Right Left    134   EXT 5 degrees lacking 0   Hyperextension  0     Knee Strength (* = pain) Right Left   FL 5-/5 5-/5   EXT 5-/5 5-/5   Quad Contraction (Good/Fair/Poor) fair good   Hip ABD NT/5 NT/5     Assessment & Plan   CLINICAL IMPRESSIONS  Medical Diagnosis: Acute pain of both knees    Treatment Diagnosis: Acute pain of both knees   Impression/Assessment: Patient is a 69 year old male with bilateral knee complaints.  The following significant findings have been identified: Pain, Decreased ROM/flexibility, Decreased joint mobility, Decreased strength, Impaired balance, Decreased proprioception, Impaired muscle performance, Decreased activity tolerance, and Impaired posture. These impairments interfere with their ability to perform self care tasks, work tasks, recreational activities, household chores, household mobility, and community mobility as compared to previous level of function.     Clinical Decision Making (Complexity):  Clinical Presentation: Stable/Uncomplicated  Clinical Presentation Rationale: based on medical and personal factors listed in PT evaluation  Clinical Decision Making (Complexity): Low complexity    PLAN OF CARE  Treatment Interventions:  Interventions: Manual Therapy, Neuromuscular Re-education, Therapeutic  Activity, Therapeutic Exercise, Self-Care/Home Management    Long Term Goals     PT Goal 1  Goal Description: patient will be able to squat with 0/10 pain  Rationale: to maximize safety and independence with performance of ADLs and functional tasks  Target Date: 06/26/24      Frequency of Treatment: 1x per week  Duration of Treatment: for 4 weeks tapering down to 2x per month for 8 weeks    Recommended Referrals to Other Professionals:  None  Education Assessment:   Learner/Method: Patient;No Barriers to Learning    Risks and benefits of evaluation/treatment have been explained.   Patient/Family/caregiver agrees with Plan of Care.     Evaluation Time:     PT Eval, Low Complexity Minutes (83321): 20  Signing Clinician: GARRICK Dominguez [facility]                                                                                   OUTPATIENT PHYSICAL THERAPY      PLAN OF TREATMENT FOR OUTPATIENT REHABILITATION   Patient's Last Name, First Name, JAYMIEMELANIE  NicholsonMurray shafer YOB: 1955   Provider's Name   [facility]   Medical Record No.  [id number]     Onset Date: 03/07/24 (date of PT referral)  Start of Care Date: 04/03/24     Medical Diagnosis:  Acute pain of both knees      PT Treatment Diagnosis:  Acute pain of both knees Plan of Treatment  Frequency/Duration: 1x per week/ for 4 weeks tapering down to 2x per month for 8 weeks    Certification date from 04/03/24 to 06/26/24         See note for plan of treatment details and functional goals     Arpan Degroot PT                         I CERTIFY THE NEED FOR THESE SERVICES FURNISHED UNDER        THIS PLAN OF TREATMENT AND WHILE UNDER MY CARE     (Physician attestation of this document indicates review and certification of the therapy plan).              Referring Provider:  Alicia Shearer    Initial Assessment  See Epic Evaluation- Start of Care Date: 04/03/24

## 2024-04-03 ENCOUNTER — THERAPY VISIT (OUTPATIENT)
Dept: PHYSICAL THERAPY | Facility: CLINIC | Age: 69
End: 2024-04-03
Attending: NURSE PRACTITIONER
Payer: MEDICARE

## 2024-04-03 DIAGNOSIS — M25.561 ACUTE PAIN OF BOTH KNEES: ICD-10-CM

## 2024-04-03 DIAGNOSIS — M25.562 ACUTE PAIN OF BOTH KNEES: ICD-10-CM

## 2024-04-03 PROCEDURE — 97161 PT EVAL LOW COMPLEX 20 MIN: CPT | Mod: GP | Performed by: PHYSICAL THERAPIST

## 2024-04-03 PROCEDURE — 97110 THERAPEUTIC EXERCISES: CPT | Mod: GP | Performed by: PHYSICAL THERAPIST

## 2024-04-03 NOTE — TELEPHONE ENCOUNTER
Coordinator returned pt's call and left msg. Explained if he is interested in transplant in the future that he would need to wait a year and have a letter of support from his nephrologist. At that time if he was re-listed at our center his coordinator could request to get his waiting time back from [facility].   
numerical 0-10

## 2024-04-06 ENCOUNTER — HEALTH MAINTENANCE LETTER (OUTPATIENT)
Age: 69
End: 2024-04-06

## 2024-04-08 ENCOUNTER — TELEPHONE (OUTPATIENT)
Dept: AUDIOLOGY | Facility: CLINIC | Age: 69
End: 2024-04-08
Payer: MEDICARE

## 2024-04-08 NOTE — TELEPHONE ENCOUNTER
The patient dropped off his hearing aids [facility], indicating the right one was not charging.  Initial examination found the right device would not turn on or charge.  A battery reset was performed and afterwards the hearing aid returned to normal function.  Both hearing aids were cleaned and the  filters and domes were replaced.  A listening check found them to be working properly and the patient was contacted via [employer] to pick them up.

## 2024-04-09 ENCOUNTER — TELEPHONE (OUTPATIENT)
Dept: FAMILY MEDICINE | Facility: CLINIC | Age: 69
End: 2024-04-09
Payer: MEDICARE

## 2024-04-09 NOTE — TELEPHONE ENCOUNTER
Forms/Letter Request     Type of form/letter: Diabetic Supplies diabetic standard written order      Do we have the form/letter: Yes: placed in care team 2 providers form folder    Who is the form from? [facility] retail medicare department (if other please explain)    Where did/will the form come from? form was faxed in    When is form/letter needed by: non given    How would you like the form/letter returned: Fax : [phone]

## 2024-04-10 ENCOUNTER — IMMUNIZATION (OUTPATIENT)
Dept: FAMILY MEDICINE | Facility: CLINIC | Age: 69
End: 2024-04-10
Payer: MEDICARE

## 2024-04-10 DIAGNOSIS — Z23 HIGH PRIORITY FOR 2019-NCOV VACCINE: Primary | ICD-10-CM

## 2024-04-10 PROCEDURE — 91320 SARSCV2 VAC 30MCG TRS-SUC IM: CPT

## 2024-04-10 PROCEDURE — 90480 ADMN SARSCOV2 VAC 1/ONLY CMP: CPT

## 2024-04-10 NOTE — PROGRESS NOTES
Prior to immunization administration, verified patients identity using patient s name and date of birth. Please see Immunization Activity for additional information.     Screening Questionnaire for Adult Immunization    Are you sick today?   No   Do you have allergies to medications, food, a vaccine component or latex?   Yes   Have you ever had a serious reaction after receiving a vaccination?   No   Do you have a long-term health problem with heart, lung, kidney, or metabolic disease (e.g., diabetes), asthma, a blood disorder, no spleen, complement component deficiency, a cochlear implant, or a spinal fluid leak?  Are you on long-term aspirin therapy?   Yes   Do you have cancer, leukemia, HIV/AIDS, or any other immune system problem?   No   Do you have a parent, brother, or sister with an immune system problem?   Yes   In the past 3 months, have you taken medications that affect  your immune system, such as prednisone, other steroids, or anticancer drugs; drugs for the treatment of rheumatoid arthritis, Crohn s disease, or psoriasis; or have you had radiation treatments?   No   Have you had a seizure, or a brain or other nervous system problem?   No   During the past year, have you received a transfusion of blood or blood    products, or been given immune (gamma) globulin or antiviral drug?   No   For women: Are you pregnant or is there a chance you could become       pregnant during the next month?   No   Have you received any vaccinations in the past 4 weeks?   No     Immunization questionnaire was positive for at least one answer.  Notified Per kidney doctor additional covid vaccine.    I have reviewed the following standing orders:   This patient is due and qualifies for the Covid-19 vaccine.     Click here for COVID-19 Standing Order    I have reviewed the vaccines inclusion and exclusion criteria; No concerns regarding eligibility.     Patient instructed to remain in clinic for 15 minutes afterwards, and to  report any adverse reactions.     Screening performed by Pa Montgomery MA on 4/10/2024 at 2:41 PM.

## 2024-04-11 ENCOUNTER — TELEPHONE (OUTPATIENT)
Dept: FAMILY MEDICINE | Facility: CLINIC | Age: 69
End: 2024-04-11
Payer: MEDICARE

## 2024-04-11 NOTE — TELEPHONE ENCOUNTER
Forms/Letter Request    Type of form/letter: Diabetic Supplies      Do we have the form/letter: Yes: placed in care team 2 providers form folder    Who is the form from? [employer] (if other please explain)    Where did/will the form come from? form was faxed in    When is form/letter needed by: non given    How would you like the form/letter returned: Fax : [phone]

## 2024-04-18 NOTE — TELEPHONE ENCOUNTER
Aurelia- I believe I sent this last week.  Can you confirm.  Might help [facility] to figure out what my name is!

## 2024-04-22 NOTE — PROGRESS NOTES
This chart was accessed for instructor review.      negative normal/soft/nontender/nondistended/normal active bowel sounds

## 2024-04-24 ENCOUNTER — MYC MEDICAL ADVICE (OUTPATIENT)
Dept: CARDIOLOGY | Facility: CLINIC | Age: 69
End: 2024-04-24

## 2024-04-24 ENCOUNTER — THERAPY VISIT (OUTPATIENT)
Dept: PHYSICAL THERAPY | Facility: CLINIC | Age: 69
End: 2024-04-24
Payer: MEDICARE

## 2024-04-24 DIAGNOSIS — M25.561 ACUTE PAIN OF BOTH KNEES: Primary | ICD-10-CM

## 2024-04-24 DIAGNOSIS — M25.562 ACUTE PAIN OF BOTH KNEES: Primary | ICD-10-CM

## 2024-04-24 PROCEDURE — 97140 MANUAL THERAPY 1/> REGIONS: CPT | Mod: GP | Performed by: PHYSICAL THERAPIST

## 2024-04-24 PROCEDURE — 97110 THERAPEUTIC EXERCISES: CPT | Mod: GP | Performed by: PHYSICAL THERAPIST

## 2024-04-25 ENCOUNTER — TELEPHONE (OUTPATIENT)
Dept: TRANSPLANT | Facility: CLINIC | Age: 69
End: 2024-04-25
Payer: MEDICARE

## 2024-04-25 NOTE — TELEPHONE ENCOUNTER
Prior Authorization Specialty Medication Request    Medication/Dose: Mycophenolate  Diagnosis and ICD code (if different than what is on RX):  Z94.1  New/renewal/insurance change PA/secondary ins. PA:  Previously Tried and Failed:  NA    Important Lab Values: WBC 4.3; Mycophenolic Acid by Mass Spectrometry 1.13; MPA Glucuronide by Tandem Mass Spectrometry 27.4  Rationale: Labs within acceptable range on mycophenolate    Insurance   Primary: Medicare  Insurance ID:  [id number]    Secondary (if applicable):[place] Basic Medicare Supplement  Insurance ID:  [id number]    Pharmacy Information (if different than what is on RX)  Name:  [facility]  Phone:  [phone]  Fax:

## 2024-04-26 DIAGNOSIS — D84.9 IMMUNOSUPPRESSED STATUS (H): ICD-10-CM

## 2024-04-26 DIAGNOSIS — Z94.0 KIDNEY REPLACED BY TRANSPLANT: ICD-10-CM

## 2024-04-26 DIAGNOSIS — Z94.0 KIDNEY TRANSPLANTED: ICD-10-CM

## 2024-04-26 DIAGNOSIS — E87.20 METABOLIC ACIDOSIS: ICD-10-CM

## 2024-04-26 DIAGNOSIS — Z94.1 HEART REPLACED BY TRANSPLANT (H): ICD-10-CM

## 2024-04-26 RX ORDER — TACROLIMUS 0.5 MG/1
0.5 CAPSULE ORAL 2 TIMES DAILY
Qty: 180 CAPSULE | Refills: 3 | Status: SHIPPED | OUTPATIENT
Start: 2024-04-26 | End: 2024-08-15

## 2024-04-26 RX ORDER — MYCOPHENOLATE MOFETIL 250 MG/1
500 CAPSULE ORAL 2 TIMES DAILY
Qty: 120 CAPSULE | Refills: 11 | Status: SHIPPED | OUTPATIENT
Start: 2024-04-26

## 2024-04-26 RX ORDER — SULFAMETHOXAZOLE AND TRIMETHOPRIM 400; 80 MG/1; MG/1
1 TABLET ORAL EVERY MORNING
Qty: 90 TABLET | Refills: 3 | Status: SHIPPED | OUTPATIENT
Start: 2024-04-26

## 2024-04-30 ENCOUNTER — MYC MEDICAL ADVICE (OUTPATIENT)
Dept: FAMILY MEDICINE | Facility: CLINIC | Age: 69
End: 2024-04-30
Payer: MEDICARE

## 2024-05-01 ENCOUNTER — TELEPHONE (OUTPATIENT)
Dept: ENDOCRINOLOGY | Facility: CLINIC | Age: 69
End: 2024-05-01

## 2024-05-01 NOTE — TELEPHONE ENCOUNTER
Patient confirmed scheduled appointment:  Date: 5/2   Time: 12 pm   Visit type: return diabetes   Provider: Nehal  Location: [facility]  Testing/imaging: NA   Additional notes: Spoke to pt and re-krissy appt today due to [employer] internet going out.     Armida Rhodes on 5/1/2024 at 8:42 AM

## 2024-05-01 NOTE — TELEPHONE ENCOUNTER
Immunizations are up to date.  Ambulatory Abstraction: please see eye exam dates below.  Writer responded via [employer].    Yes I was talking about [facility] [address] [place] [address]. Silly me i thought (because i merely scanned her massage) My bad, that i had to contact the Ambulatory Abstraction Team. So today i tried to find them and the  handed me off to Billing. KARAN rudd. She didn't seem to know the Number.   The dates of my exam have been 9/21/23 and 7/12/22 and  6/102020.    REMI DaveyN, RN  [facility]  [phone]

## 2024-05-02 ENCOUNTER — VIRTUAL VISIT (OUTPATIENT)
Dept: ENDOCRINOLOGY | Facility: CLINIC | Age: 69
End: 2024-05-02
Payer: MEDICARE

## 2024-05-02 DIAGNOSIS — E11.22 TYPE 2 DIABETES MELLITUS WITH CHRONIC KIDNEY DISEASE, WITHOUT LONG-TERM CURRENT USE OF INSULIN, UNSPECIFIED CKD STAGE (H): Primary | ICD-10-CM

## 2024-05-02 PROCEDURE — 99214 OFFICE O/P EST MOD 30 MIN: CPT | Mod: 95 | Performed by: PHYSICIAN ASSISTANT

## 2024-05-02 RX ORDER — LANCETS
EACH MISCELLANEOUS
Qty: 250 EACH | Refills: 5 | Status: SHIPPED | OUTPATIENT
Start: 2024-05-02

## 2024-05-02 ASSESSMENT — PAIN SCALES - GENERAL: PAINLEVEL: NO PAIN (0)

## 2024-05-02 NOTE — NURSING NOTE
Is the patient currently in the state of [place]? YES    Visit mode:VIDEO    If the visit is dropped, the patient can be reconnected by: VIDEO VISIT: Text to cell phone:   Telephone Information:   Mobile [phone]       Will anyone else be joining the visit? NO  (If patient encounters technical issues they should call [phone]    How would you like to obtain your AVS? MyChart    Are changes needed to the allergy or medication list? No    Are refills needed on medications prescribed by this physician? NO    Reason for visit: RECHECK (6 month follow-up )    Janine PRICE

## 2024-05-02 NOTE — PROGRESS NOTES
Time of start: 12:00 pm   Time of end: 12:16 pm   Total duration of video visit: 16 minutes.  Providers location: Off-site.  Patient's location: [place].    HPI  Murray Nicholson is a 69 year old male with type 2 diabetes mellitus. Video visit for diabetes follow up today.  Pt gives a hx of type 2 diabetes > 20 years complicated by ESRD s/p kidney transplant in Dec 2019.   Pt denies known hx of retinopathy or sx of neuropathy.  He has hx CAD s/p heart transplant in June 2018.  Pt also has hx of HTN, hyperlipidemia, ascending aortic aneurysm and GERD.  For his diabetes, he is currently taking Jardiance 10 mg each am and Metformin 500 mg 4 tabs in am.  Most recent A1C was 6.7 % on 1/29/2024.    Previous A1C was 7.0 % on 9/26/2023 and A1C was 7.3 % on 6/19/2023.   He provided me with a few blood sugar readings today which I scanned in his note below.  Most of these blood sugar values are fasting and good values.  On ROS today, he is eating healthy, reduced his food portions and less sweets.  He remains activity and walks daily.  Denies blurred vision, n/v, SOB at rest, cough, fever, chest pain or abd pain.  No diarrhea, dysuria or hematuria.  He denies sx of neuropathy or foot ulcers.  No sx of groin yeast infection.    Diabetes Care  Retinopathy: none per patient; pt seen by [facility] in 9/2023 without retinopathy.  Nephropathy:hx of ESRD s/p kidney transplant in 12/2019. Urine microalbuminuria negative in 10/2023.  Neuropathy: none per patient.  Foot Exam: no exam today.  Taking aspirin: yes.  Lipids: LDL 50 in January 2024. Pt taking Lipitor.  Insulin: none.  DM meds: Metformin and Jardiance.  Testing: glucose meter.            ROS  See under HPI.      Allergies  Allergies   Allergen Reactions    Cats Shortness Of Breath and Anaphylaxis    Penicillins Hives    Shrimp Shortness Of Breath and Anaphylaxis    Isosorbide Nitrate      hypotension    Norco [Hydrocodone-Acetaminophen] Nausea and Vomiting       Medications  Current  Outpatient Medications   Medication Sig Dispense Refill    alcohol swab prep pads Use to swab area of injection/davida as directed. 300 each 6    amLODIPine (NORVASC) 5 MG tablet Take 1 tablet (5 mg) by mouth daily 90 tablet 3    apixaban ANTICOAGULANT (ELIQUIS) 5 MG tablet Take 1 tablet (5 mg) by mouth 2 times daily 60 tablet 1    aspirin 81 MG EC tablet Take 81 mg by mouth every evening      atorvastatin (LIPITOR) 40 MG tablet Take 1 tablet (40 mg) by mouth every evening 30 tablet 11    blood glucose (ACCU-CHEK GUIDE) test strip Use to test blood sugar 3 times daily or as directed. 300 strip 1    blood glucose (ACCU-CHEK SOFTCLIX) lancing device Lancing device to be used with lancets. Test blood sugar 3 times daily or as directed 1 each 0    blood glucose (NO BRAND SPECIFIED) test strip Use to test blood sugar 3 times daily or as directed. Any covered brand that works with meter. 300 strip 1    blood glucose monitoring (ACCU-CHEK FASTCLIX) lancets USE TO TEST 3 TIMES DAILY OR AS DIRECTED. 300 each 1    blood glucose monitoring (SOFTCLIX) lancets Use to test blood sugar 3 times daily. 300 each 6    Blood Glucose Monitoring Suppl (ACCU-CHEK GUIDE) w/Device KIT USE TO TEST BLOOD SUGAR THREE TIMES DAILY AS DIRECTED.      Calcium Polycarbophil (FIBER) 625 MG tablet Take by mouth every morning      empagliflozin (JARDIANCE) 10 MG TABS tablet Take 1 tablet (10 mg) by mouth daily 90 tablet 2    loratadine (CLARITIN) 10 MG tablet Take 10 mg by mouth every evening Patient takes at bedtime      magnesium oxide (MAG-OX) 400 MG tablet Take 1 tablet (400 mg) by mouth 2 times daily 180 tablet 3    metFORMIN (GLUCOPHAGE XR) 500 MG 24 hr tablet Take 4 tabs every  tablet 1    Multiple Vitamins-Minerals (HAIR SKIN NAILS PO) Take by mouth every morning      mycophenolate (GENERIC EQUIVALENT) 250 MG capsule Take 2 capsules (500 mg) by mouth 2 times daily 120 capsule 11    mycophenolate (GENERIC EQUIVALENT) 250 MG capsule Take 2  capsules (500 mg) by mouth 2 times daily 120 capsule 11    OMEPRAZOLE PO Take 20 mg by mouth every morning      sulfamethoxazole-trimethoprim (BACTRIM) 400-80 MG tablet Take 1 tablet by mouth every morning 90 tablet 3    tacrolimus (GENERIC EQUIVALENT) 0.5 MG capsule Take 1 capsule (0.5 mg) by mouth 2 times daily 180 capsule 3    tacrolimus (GENERIC EQUIVALENT) 1 MG capsule ON HOLD due to dose change 90 capsule 3    thin (NO BRAND SPECIFIED) lancets Use with lanceting device 3x daily. Any covered brand that works with lancing device. 300 each 1       Family History  family history includes C.A.D. in his father; Cerebrovascular Disease in his father; Diabetes in his father; Hypertension in his father.    Social History   reports that he quit smoking about 29 years ago. His smoking use included cigarettes. He started smoking about 40 years ago. He has a 20 pack-year smoking history. He has never used smokeless tobacco. He reports current alcohol use. He reports that he does not use drugs.     Past Medical History  Past Medical History:   Diagnosis Date    (HFpEF) heart failure with preserved ejection fraction (H)     Allergic rhinitis, cause unspecified     Anemia of chronic kidney failure     Aortic stenosis 08/13/2008    Overview:  Bicuspid aortic valve    AS (aortic stenosis)     Ascending aortic aneurysm (H24)     Bicuspid aortic valve     Bilateral hand pain 06/01/2021    CAD (coronary artery disease)     Congestive heart failure, unspecified     Coronary artery disease involving native artery of transplanted heart without angina pectoris 07/10/2014    Dialysis patient (H24)     Tues-Roxaneur-Sat    Dyslipidemia     Esophageal reflux     ESRD (end stage renal disease) (H)     Hearing problem     Heart replaced by transplant (H) 12/10/2018    Hypersomnia with sleep apnea, unspecified     Hypertension     Ileostomy status (H)     Immunosuppression (H24)     MGUS (monoclonal gammopathy of unknown significance)      Mitral regurgitation     SHEELA (obstructive sleep apnea)     No CPAP    Pneumonia     Sigmoid diverticulitis 08/14/2018    Status post coronary angiogram 06/28/2019    Systolic heart failure (H)     Type 2 diabetes mellitus (H)     Ventral hernia without obstruction or gangrene        Past Surgical History:   Procedure Laterality Date    BIOPSY      CARDIAC SURGERY      COLONOSCOPY N/A 5/3/2018    Procedure: COLONOSCOPY;  colonoscopy ;  Surgeon: Ammon Castillo MD;  Location: [facility]    COLONOSCOPY N/A 5/26/2023    Procedure: COLONOSCOPY, WITH POLYPECTOMY via bx forceps;  Surgeon: Francisco Bland MD;  Location: [facility]    CREATE FISTULA ARTERIOVENOUS UPPER EXTREMITY BOVINE Left 5/8/2019    Procedure: Left Upper Extremity Arteriovenous Bovine Graft Creation;  Surgeon: Calin Cheney MD;  [facility]    CV CORONARY ANGIOGRAM N/A 6/28/2019    Procedure: CV CORONARY ANGIOGRAM;  Surgeon: Montrell Posada MD;  [facility]    CV CORONARY ANGIOGRAM N/A 7/15/2020    Procedure: CV CORONARY ANGIOGRAM;  Surgeon: Marcelo Ramírez MD;  [facility]    CV CORONARY ANGIOGRAM N/A 6/7/2021    Procedure: CV CORONARY ANGIOGRAM;  Surgeon: Gilberto Mccann MD;  [facility]    CV CORONARY ANGIOGRAM N/A 6/13/2022    Procedure: Coronary Angiogram;  Surgeon: Marcelo Ramírez MD;  [facility]    CV HEART BIOPSY N/A 2/1/2019    Procedure: HBX;  Surgeon: Montrell Posada MD;  [facility]    CV HEART BIOPSY N/A 3/22/2019    Procedure: HBX, RIJV ACCESS;  Surgeon: Jordan Fox MD;  [facility]    CV HEART BIOPSY N/A 6/28/2019    Procedure: CV HEART BIOPSY;  Surgeon: Montrell Posada MD;  [facility]    CV HEART BIOPSY N/A 10/28/2019    Procedure: CV HEART BIOPSY;  Surgeon: Marcelo Ramírez MD;  [facility]    CV HEART  BIOPSY N/A 2/6/2020    Procedure: CV HEART BIOPSY;  Surgeon: Montrell Posada MD;  [facility]    CV HEART BIOPSY N/A 7/15/2020    Procedure: CV HEART BIOPSY;  Surgeon: Marcelo Ramírez MD;  [facility]    CV RIGHT HEART CATH MEASUREMENTS RECORDED N/A 6/28/2019    Procedure: CV RIGHT HEART CATH;  Surgeon: Montrell Posada MD;  [facility]    CV RIGHT HEART CATH MEASUREMENTS RECORDED N/A 7/15/2020    Procedure: CV RIGHT HEART CATH;  Surgeon: Marcelo Ramírez MD;  Location:  [facility]    CV RIGHT HEART CATH MEASUREMENTS RECORDED N/A 6/7/2021    Procedure: CV RIGHT HEART CATH;  Surgeon: Gilberto Mccann MD;  Location:  [facility]    CV RIGHT HEART CATH MEASUREMENTS RECORDED N/A 6/13/2022    Procedure: Right Heart Catheterization;  Surgeon: Marcelo Ramírez MD;  Location:  [facility]    ENDOSCOPIC ULTRASOUND UPPER GASTROINTESTINAL TRACT (GI) N/A 11/8/2021    Procedure: ENDOSCOPIC ULTRASOUND, ESOPHAGOSCOPY / UPPER GASTROINTESTINAL TRACT (GI)  EUS with FNA;  Surgeon: Alon Don MD;  [facility]    ESOPHAGOSCOPY, GASTROSCOPY, DUODENOSCOPY (EGD), COMBINED N/A 5/7/2018    Procedure: COMBINED ENDOSCOPIC ULTRASOUND, ESOPHAGOSCOPY, GASTROSCOPY, DUODENOSCOPY (EGD), FINE NEEDLE ASPIRATE/BIOPSY;  Endoscopic Ultrasound with Fine Needle Aspiration ;  Surgeon: Alon Don MD;  [facility]    EXAM UNDER ANESTHESIA RECTUM N/A 8/12/2018    Procedure: EXAM UNDER ANESTHESIA RECTUM;  EXAM UNDER ANESTHESIA RECTUM ,COMBINED INCISION AND DRAINAGE OF RECTAL ABCESS ;  Surgeon: Rick Tran MD;  [facility]    HERNIORRHAPHY VENTRAL N/A 6/24/2022    Procedure: open mesh repair, incisional ventral hernia;  Surgeon: Shaheed Curtis MD;  [facility]    INCISION AND DRAINAGE RECTUM, COMBINED N/A 8/12/2018    Procedure: COMBINED INCISION AND DRAINAGE  RECTUM;;  Surgeon: Rick Tran MD;  [facility]    IR DIALYSIS FISTULOGRAM LEFT  9/25/2019    IR DIALYSIS FISTULOGRAM LEFT  11/22/2019    IR DIALYSIS PTA  9/25/2019    IR DIALYSIS PTA  11/22/2019    LAPAROSCOPIC ASSISTED COLOSTOMY TAKEDOWN N/A 12/11/2018    Procedure: Laparoscopic Assisted Colostomy Takedown, Laparoscopic Lysis of Adhesions;  Surgeon: Rick Tran MD;  [facility]    LAPAROSCOPIC ASSISTED SIGMOID COLECTOMY N/A 8/14/2018    Procedure: LAPAROSCOPIC ASSISTED SIGMOID COLECTOMY;  Laparoscopic Hand Assisted Takedown of Splenic Flexure, Sigmoidectomy, Small Bowel Resection, Takedown of Small Bowel to Colon Fistula;  Surgeon: Rick Tran MD;  [facility]    LAPAROSCOPIC HERNIORRHAPHY INGUINAL BILATERAL Bilateral 7/24/2015    Procedure: LAPAROSCOPIC HERNIORRHAPHY INGUINAL BILATERAL;  Surgeon: Bobby Mcconnell MD;  [facility]    LAPAROSCOPIC INSERTION CATHETER PERITONEAL DIALYSIS N/A 6/22/2017    Procedure: LAPAROSCOPIC INSERTION CATHETER PERITONEAL DIALYSIS;  Laparoscopic Peritoneal Dialysis Catheter Placement - Anesthesia with block;  Surgeon: Esteban Arvizu MD;  [facility]    PICC INSERTION Left 04/22/2018    5Fr - 49cm (3cm external), Basilic vein, low SVC    REMOVE CATHETER PERITONEAL Right 1/15/2018    Procedure: REMOVE CATHETER PERITONEAL;  Open Removal of Peritoneal Dialysis Catheter ;  Surgeon: Esteban Arvizu MD;  [facility]    SIGMOIDOSCOPY FLEXIBLE N/A 11/21/2018    Procedure: Examination Under Anesthesia, Flexible Sigmoidoscopy and Polypectomy;  Surgeon: Rick Tran MD;  [facility]    SIGMOIDOSCOPY FLEXIBLE N/A 12/11/2018    Procedure: Flexible Sigmoidoscopy;  Surgeon: Rick Tran MD;  [facility]    TAKEDOWN ILEOSTOMY N/A 3/27/2019    Procedure: Takedown Ileostomy;  Surgeon: Rick Tran MD;  [facility]    TRANSPLANT HEART RECIPIENT N/A 6/14/2018    Procedure:  TRANSPLANT HEART RECIPIENT;  Median Sternotomy, on-pump oxygenator, Heart Transplant;  Surgeon: Rony Caputo MD;  [facility]    TRANSPLANT KIDNEY RECIPIENT LIVING UNRELATED  12/2019       Physical Exam    No exam today.    RESULTS  Creatinine   Date Value Ref Range Status   02/26/2024 1.20 (H) 0.67 - 1.17 mg/dL Final   07/06/2021 1.09 0.66 - 1.25 mg/dL Final     GFR Estimate   Date Value Ref Range Status   02/26/2024 65 >60 mL/min/1.73m2 Final   07/06/2021 70 >60 mL/min/[1.73_m2] Final     Comment:     Non  GFR Calc  Starting 12/18/2018, serum creatinine based estimated GFR (eGFR) will be   calculated using the Chronic Kidney Disease Epidemiology Collaboration   (CKD-EPI) equation.       Hemoglobin A1C   Date Value Ref Range Status   01/29/2024 6.7 (H) <5.7 % Final     Comment:     Normal <5.7%   Prediabetes 5.7-6.4%    Diabetes 6.5% or higher     Note: Adopted from ADA consensus guidelines.   06/07/2021 6.2 (H) 0 - 5.6 % Final     Comment:     Normal <5.7% Prediabetes 5.7-6.4%  Diabetes 6.5% or higher - adopted from ADA   consensus guidelines.       Potassium   Date Value Ref Range Status   02/26/2024 4.2 3.4 - 5.3 mmol/L Final   08/09/2022 3.7 3.4 - 5.3 mmol/L Final   07/06/2021 3.9 3.4 - 5.3 mmol/L Final     ALT   Date Value Ref Range Status   01/29/2024 14 0 - 70 U/L Final     Comment:     Reference intervals for this test were updated on 6/12/2023 to more accurately reflect our healthy population. There may be differences in the flagging of prior results with similar values performed with this method. Interpretation of those prior results can be made in the context of the updated reference intervals.     06/07/2021 28 0 - 70 U/L Final     AST   Date Value Ref Range Status   01/29/2024 18 0 - 45 U/L Final     Comment:     Reference intervals for this test were updated on 6/12/2023 to more accurately reflect our healthy population. There may be differences in the flagging of prior  results with similar values performed with this method. Interpretation of those prior results can be made in the context of the updated reference intervals.   06/07/2021 27 0 - 45 U/L Final     TSH   Date Value Ref Range Status   09/07/2021 1.89 0.40 - 4.00 mU/L Final   04/16/2018 2.25 0.40 - 4.00 mU/L Final       Cholesterol   Date Value Ref Range Status   01/29/2024 147 <200 mg/dL Final   06/19/2023 152 <200 mg/dL Final   06/07/2021 120 <200 mg/dL Final   12/09/2020 116 <200 mg/dL Final     HDL Cholesterol   Date Value Ref Range Status   06/07/2021 49 >39 mg/dL Final   12/09/2020 47 >39 mg/dL Final     Direct Measure HDL   Date Value Ref Range Status   01/29/2024 47 >=40 mg/dL Final   06/19/2023 47 >=40 mg/dL Final     LDL Cholesterol Calculated   Date Value Ref Range Status   01/29/2024 50 <=100 mg/dL Final   06/19/2023 64 <=100 mg/dL Final   06/07/2021 28 <100 mg/dL Final     Comment:     Desirable:       <100 mg/dl   12/09/2020 26 <100 mg/dL Final     Comment:     Desirable:       <100 mg/dl     Triglycerides   Date Value Ref Range Status   01/29/2024 248 (H) <150 mg/dL Final   06/19/2023 205 (H) <150 mg/dL Final   06/07/2021 215 (H) <150 mg/dL Final     Comment:     Borderline high:  150-199 mg/dl  High:             200-499 mg/dl  Very high:       >499 mg/dl     12/09/2020 214 (H) <150 mg/dL Final     Comment:     Borderline high:  150-199 mg/dl  High:             200-499 mg/dl  Very high:       >499 mg/dl       Cholesterol/HDL Ratio   Date Value Ref Range Status   08/10/2015 5.0 0.0 - 5.0 Final   04/09/2015 4.0 0.0 - 5.0 Final         ASSESSMENT/PLAN:    1.  TYPE 2 DIABETES MELLITUS: Patient's A1C has improved with addition of Jardiance.  A1C 6.7 % on 1/29/2024.  Continue Jardiance 10 mg each am and Metformin 500 mg 2 tablets each am and 2 tablets each pm.  Again, I reviewed the possible side effects of Jardiance including dehydration, UTI and groin yeast infection.  Also reviewed the cardiovascular and renal  benefits of taking a SGLT-2 drug.  Reminded Murray to drink plenty of water.  Most recent creat 1.02 with GFR 65 mL/min in February 2024.  Pt is eating healthy and exercising.  I asked pt to check his FBS each am and 2 hr postmeal blood sugar.  Pt seen by [facility] in 9/2023 without retinopathy.  No sx of neuropathy and he denies foot ulcers.  No vitals today.    2.  HX OF ESRD: S/P kidney transplant in Dec 2019.  Most recent creat 1.11 with GFR 72 mL/min in 10/2023.    3.  CARDIAC: S/P heart transplant in 6/2018.    4.  LIPIDS: LDL 50 in January 2024. Pt taking Lipitor.    5.  FOLLOW UP: With me in 6 months.    Accu-Chek Softclix lancets ordered today  Consider graduating from [facility] next visit.    Time spent reviewing chart, labs and glucose data today =5  minutes.  Time for video visit today =  16 minutes.  Time for documentation today = 10 minutes.    Total time for visit today = 31 minutes.    Rosa Calvert PA-C

## 2024-05-02 NOTE — LETTER
5/2/2024       RE: Murray Nicholson  [address]  [place] [address]     Dear Colleague,    Thank you for referring your patient, Murray Nicholson, to the [facility] [facility]. Please see a copy of my visit note below.    Outcome for 05/01/24 10:10 AM: Glucose readings sent via Deep Ninesyared Sewell LPN         Virtual Visit Details    Type of service:  Video Visit   Video Start Time:   Video End Time:    Originating Location (pt. Location):     Distant Location (provider location):    Platform used for Video Visit:       Time of start: 12:00 pm   Time of end: 12:16 pm   Total duration of video visit: 16 minutes.  Providers location: Off-site.  Patient's location: [place].    HPI  Murray Nicholson is a 69 year old male with type 2 diabetes mellitus. Video visit for diabetes follow up today.  Pt gives a hx of type 2 diabetes > 20 years complicated by ESRD s/p kidney transplant in Dec 2019.   Pt denies known hx of retinopathy or sx of neuropathy.  He has hx CAD s/p heart transplant in June 2018.  Pt also has hx of HTN, hyperlipidemia, ascending aortic aneurysm and GERD.  For his diabetes, he is currently taking Jardiance 10 mg each am and Metformin 500 mg 4 tabs in am.  Most recent A1C was 6.7 % on 1/29/2024.    Previous A1C was 7.0 % on 9/26/2023 and A1C was 7.3 % on 6/19/2023.   He provided me with a few blood sugar readings today which I scanned in his note below.  Most of these blood sugar values are fasting and good values.  On ROS today, he is eating healthy, reduced his food portions and less sweets.  He remains activity and walks daily.  Denies blurred vision, n/v, SOB at rest, cough, fever, chest pain or abd pain.  No diarrhea, dysuria or hematuria.  He denies sx of neuropathy or foot ulcers.  No sx of groin yeast infection.    Diabetes Care  Retinopathy: none per patient; pt seen by Oph in 9/2023 without  retinopathy.  Nephropathy:hx of ESRD s/p kidney transplant in 12/2019. Urine microalbuminuria negative in 10/2023.  Neuropathy: none per patient.  Foot Exam: no exam today.  Taking aspirin: yes.  Lipids: LDL 50 in January 2024. Pt taking Lipitor.  Insulin: none.  DM meds: Metformin and Jardiance.  Testing: glucose meter.            ROS  See under HPI.      Allergies  Allergies   Allergen Reactions    Cats Shortness Of Breath and Anaphylaxis    Penicillins Hives    Shrimp Shortness Of Breath and Anaphylaxis    Isosorbide Nitrate      hypotension    Norco [Hydrocodone-Acetaminophen] Nausea and Vomiting       Medications  Current Outpatient Medications   Medication Sig Dispense Refill    alcohol swab prep pads Use to swab area of injection/davida as directed. 300 each 6    amLODIPine (NORVASC) 5 MG tablet Take 1 tablet (5 mg) by mouth daily 90 tablet 3    apixaban ANTICOAGULANT (ELIQUIS) 5 MG tablet Take 1 tablet (5 mg) by mouth 2 times daily 60 tablet 1    aspirin 81 MG EC tablet Take 81 mg by mouth every evening      atorvastatin (LIPITOR) 40 MG tablet Take 1 tablet (40 mg) by mouth every evening 30 tablet 11    blood glucose (ACCU-CHEK GUIDE) test strip Use to test blood sugar 3 times daily or as directed. 300 strip 1    blood glucose (ACCU-CHEK SOFTCLIX) lancing device Lancing device to be used with lancets. Test blood sugar 3 times daily or as directed 1 each 0    blood glucose (NO BRAND SPECIFIED) test strip Use to test blood sugar 3 times daily or as directed. Any covered brand that works with meter. 300 strip 1    blood glucose monitoring (ACCU-CHEK FASTCLIX) lancets USE TO TEST 3 TIMES DAILY OR AS DIRECTED. 300 each 1    blood glucose monitoring (SOFTCLIX) lancets Use to test blood sugar 3 times daily. 300 each 6    Blood Glucose Monitoring Suppl (ACCU-CHEK GUIDE) w/Device KIT USE TO TEST BLOOD SUGAR THREE TIMES DAILY AS DIRECTED.      Calcium Polycarbophil (FIBER) 625 MG tablet Take by mouth every morning       empagliflozin (JARDIANCE) 10 MG TABS tablet Take 1 tablet (10 mg) by mouth daily 90 tablet 2    loratadine (CLARITIN) 10 MG tablet Take 10 mg by mouth every evening Patient takes at bedtime      magnesium oxide (MAG-OX) 400 MG tablet Take 1 tablet (400 mg) by mouth 2 times daily 180 tablet 3    metFORMIN (GLUCOPHAGE XR) 500 MG 24 hr tablet Take 4 tabs every  tablet 1    Multiple Vitamins-Minerals (HAIR SKIN NAILS PO) Take by mouth every morning      mycophenolate (GENERIC EQUIVALENT) 250 MG capsule Take 2 capsules (500 mg) by mouth 2 times daily 120 capsule 11    mycophenolate (GENERIC EQUIVALENT) 250 MG capsule Take 2 capsules (500 mg) by mouth 2 times daily 120 capsule 11    OMEPRAZOLE PO Take 20 mg by mouth every morning      sulfamethoxazole-trimethoprim (BACTRIM) 400-80 MG tablet Take 1 tablet by mouth every morning 90 tablet 3    tacrolimus (GENERIC EQUIVALENT) 0.5 MG capsule Take 1 capsule (0.5 mg) by mouth 2 times daily 180 capsule 3    tacrolimus (GENERIC EQUIVALENT) 1 MG capsule ON HOLD due to dose change 90 capsule 3    thin (NO BRAND SPECIFIED) lancets Use with lanceting device 3x daily. Any covered brand that works with lancing device. 300 each 1       Family History  family history includes C.A.D. in his father; Cerebrovascular Disease in his father; Diabetes in his father; Hypertension in his father.    Social History   reports that he quit smoking about 29 years ago. His smoking use included cigarettes. He started smoking about 40 years ago. He has a 20 pack-year smoking history. He has never used smokeless tobacco. He reports current alcohol use. He reports that he does not use drugs.     Past Medical History  Past Medical History:   Diagnosis Date    (HFpEF) heart failure with preserved ejection fraction (H)     Allergic rhinitis, cause unspecified     Anemia of chronic kidney failure     Aortic stenosis 08/13/2008    Overview:  Bicuspid aortic valve    AS (aortic stenosis)     Ascending  aortic aneurysm (H24)     Bicuspid aortic valve     Bilateral hand pain 06/01/2021    CAD (coronary artery disease)     Congestive heart failure, unspecified     Coronary artery disease involving native artery of transplanted heart without angina pectoris 07/10/2014    Dialysis patient (H24)     Tues-Thur-Sat    Dyslipidemia     Esophageal reflux     ESRD (end stage renal disease) (H)     Hearing problem     Heart replaced by transplant (H) 12/10/2018    Hypersomnia with sleep apnea, unspecified     Hypertension     Ileostomy status (H)     Immunosuppression (H24)     MGUS (monoclonal gammopathy of unknown significance)     Mitral regurgitation     SHEELA (obstructive sleep apnea)     No CPAP    Pneumonia     Sigmoid diverticulitis 08/14/2018    Status post coronary angiogram 06/28/2019    Systolic heart failure (H)     Type 2 diabetes mellitus (H)     Ventral hernia without obstruction or gangrene        Past Surgical History:   Procedure Laterality Date    BIOPSY      CARDIAC SURGERY      COLONOSCOPY N/A 5/3/2018    Procedure: COLONOSCOPY;  colonoscopy ;  Surgeon: Ammon Castillo MD;  [facility]    COLONOSCOPY N/A 5/26/2023    Procedure: COLONOSCOPY, WITH POLYPECTOMY via bx forceps;  Surgeon: Francisco Bland MD;  [facility]    CREATE FISTULA ARTERIOVENOUS UPPER EXTREMITY BOVINE Left 5/8/2019    Procedure: Left Upper Extremity Arteriovenous Bovine Graft Creation;  Surgeon: Calin Cheney MD;  [facility]    CV CORONARY ANGIOGRAM N/A 6/28/2019    Procedure: CV CORONARY ANGIOGRAM;  Surgeon: Montrell Posada MD;  [facility]    CV CORONARY ANGIOGRAM N/A 7/15/2020    Procedure: CV CORONARY ANGIOGRAM;  Surgeon: Marcelo Ramírez MD;  [facility]    CV CORONARY ANGIOGRAM N/A 6/7/2021    Procedure: CV CORONARY ANGIOGRAM;  Surgeon: Gilberto Mccann MD;  [facility]    CV CORONARY ANGIOGRAM N/A 6/13/2022    Procedure:  Coronary Angiogram;  Surgeon: Marcelo Ramírez MD;  [facility]    CV HEART BIOPSY N/A 2/1/2019    Procedure: HBX;  Surgeon: Montrell Posada MD;  [facility]    CV HEART BIOPSY N/A 3/22/2019    Procedure: HBX, RIJV ACCESS;  Surgeon: Jordan Fox MD;  [facility]    CV HEART BIOPSY N/A 6/28/2019    Procedure: CV HEART BIOPSY;  Surgeon: Montrell Posada MD;  [facility]    CV HEART BIOPSY N/A 10/28/2019    Procedure: CV HEART BIOPSY;  Surgeon: Marcelo Ramírez MD;  [facility]    CV HEART BIOPSY N/A 2/6/2020    Procedure: CV HEART BIOPSY;  Surgeon: Montrell Posada MD;  [facility]    CV HEART BIOPSY N/A 7/15/2020    Procedure: CV HEART BIOPSY;  Surgeon: Marcelo Ramírez MD;  Location:  [facility] CARDIAC CATH LAB    CV RIGHT HEART CATH MEASUREMENTS RECORDED N/A 6/28/2019    Procedure: CV RIGHT HEART CATH;  Surgeon: Montrell Posada MD;  Location:  HEART CARDIAC CATH LAB    CV RIGHT HEART CATH MEASUREMENTS RECORDED N/A 7/15/2020    Procedure: CV RIGHT HEART CATH;  Surgeon: Marcelo Ramírez MD;  Location:  HEART CARDIAC CATH LAB    CV RIGHT HEART CATH MEASUREMENTS RECORDED N/A 6/7/2021    Procedure: CV RIGHT HEART CATH;  Surgeon: Gilberto Mccann MD;  Location:  HEART CARDIAC CATH LAB    CV RIGHT HEART CATH MEASUREMENTS RECORDED N/A 6/13/2022    Procedure: Right Heart Catheterization;  Surgeon: Marcelo Ramírez MD;  Location:  HEART CARDIAC CATH LAB    ENDOSCOPIC ULTRASOUND UPPER GASTROINTESTINAL TRACT (GI) N/A 11/8/2021    Procedure: ENDOSCOPIC ULTRASOUND, ESOPHAGOSCOPY / UPPER GASTROINTESTINAL TRACT (GI)  EUS with FNA;  Surgeon: Alon Don MD;  Location:  OR    ESOPHAGOSCOPY, GASTROSCOPY, DUODENOSCOPY (EGD), COMBINED N/A 5/7/2018    Procedure: COMBINED ENDOSCOPIC ULTRASOUND, ESOPHAGOSCOPY, GASTROSCOPY,  DUODENOSCOPY (EGD), FINE NEEDLE ASPIRATE/BIOPSY;  Endoscopic Ultrasound with Fine Needle Aspiration ;  Surgeon: Alon Don MD;  [facility]    EXAM UNDER ANESTHESIA RECTUM N/A 8/12/2018    Procedure: EXAM UNDER ANESTHESIA RECTUM;  EXAM UNDER ANESTHESIA RECTUM ,COMBINED INCISION AND DRAINAGE OF RECTAL ABCESS ;  Surgeon: Rick Tran MD;  [facility]    HERNIORRHAPHY VENTRAL N/A 6/24/2022    Procedure: open mesh repair, incisional ventral hernia;  Surgeon: Shaheed Curtis MD;  [facility]    INCISION AND DRAINAGE RECTUM, COMBINED N/A 8/12/2018    Procedure: COMBINED INCISION AND DRAINAGE RECTUM;;  Surgeon: Rick Tran MD;  [facility]    IR DIALYSIS FISTULOGRAM LEFT  9/25/2019    IR DIALYSIS FISTULOGRAM LEFT  11/22/2019    IR DIALYSIS PTA  9/25/2019    IR DIALYSIS PTA  11/22/2019    LAPAROSCOPIC ASSISTED COLOSTOMY TAKEDOWN N/A 12/11/2018    Procedure: Laparoscopic Assisted Colostomy Takedown, Laparoscopic Lysis of Adhesions;  Surgeon: Rick Tran MD;  [facility]    LAPAROSCOPIC ASSISTED SIGMOID COLECTOMY N/A 8/14/2018    Procedure: LAPAROSCOPIC ASSISTED SIGMOID COLECTOMY;  Laparoscopic Hand Assisted Takedown of Splenic Flexure, Sigmoidectomy, Small Bowel Resection, Takedown of Small Bowel to Colon Fistula;  Surgeon: Rick Tran MD;  [facility]    LAPAROSCOPIC HERNIORRHAPHY INGUINAL BILATERAL Bilateral 7/24/2015    Procedure: LAPAROSCOPIC HERNIORRHAPHY INGUINAL BILATERAL;  Surgeon: Bobby Mcconnell MD;  [facility]    LAPAROSCOPIC INSERTION CATHETER PERITONEAL DIALYSIS N/A 6/22/2017    Procedure: LAPAROSCOPIC INSERTION CATHETER PERITONEAL DIALYSIS;  Laparoscopic Peritoneal Dialysis Catheter Placement - Anesthesia with block;  Surgeon: Esteban Arvizu MD;  [facility]    PICC INSERTION Left 04/22/2018    5Fr - 49cm (3cm external), Basilic vein, low SVC    REMOVE CATHETER PERITONEAL Right 1/15/2018     Procedure: REMOVE CATHETER PERITONEAL;  Open Removal of Peritoneal Dialysis Catheter ;  Surgeon: Esteban Arvizu MD;  [facility]    SIGMOIDOSCOPY FLEXIBLE N/A 11/21/2018    Procedure: Examination Under Anesthesia, Flexible Sigmoidoscopy and Polypectomy;  Surgeon: Rick Tran MD;  [facility]    SIGMOIDOSCOPY FLEXIBLE N/A 12/11/2018    Procedure: Flexible Sigmoidoscopy;  Surgeon: Rick Tran MD;  [facility]    TAKEDOWN ILEOSTOMY N/A 3/27/2019    Procedure: Takedown Ileostomy;  Surgeon: Rick Tran MD;  [facility]    TRANSPLANT HEART RECIPIENT N/A 6/14/2018    Procedure: TRANSPLANT HEART RECIPIENT;  Median Sternotomy, on-pump oxygenator, Heart Transplant;  Surgeon: Rony Caputo MD;  [facility]    TRANSPLANT KIDNEY RECIPIENT LIVING UNRELATED  12/2019       Physical Exam    No exam today.    RESULTS  Creatinine   Date Value Ref Range Status   02/26/2024 1.20 (H) 0.67 - 1.17 mg/dL Final   07/06/2021 1.09 0.66 - 1.25 mg/dL Final     GFR Estimate   Date Value Ref Range Status   02/26/2024 65 >60 mL/min/1.73m2 Final   07/06/2021 70 >60 mL/min/[1.73_m2] Final     Comment:     Non  GFR Calc  Starting 12/18/2018, serum creatinine based estimated GFR (eGFR) will be   calculated using the Chronic Kidney Disease Epidemiology Collaboration   (CKD-EPI) equation.       Hemoglobin A1C   Date Value Ref Range Status   01/29/2024 6.7 (H) <5.7 % Final     Comment:     Normal <5.7%   Prediabetes 5.7-6.4%    Diabetes 6.5% or higher     Note: Adopted from ADA consensus guidelines.   06/07/2021 6.2 (H) 0 - 5.6 % Final     Comment:     Normal <5.7% Prediabetes 5.7-6.4%  Diabetes 6.5% or higher - adopted from ADA   consensus guidelines.       Potassium   Date Value Ref Range Status   02/26/2024 4.2 3.4 - 5.3 mmol/L Final   08/09/2022 3.7 3.4 - 5.3 mmol/L Final   07/06/2021 3.9 3.4 - 5.3 mmol/L Final     ALT   Date Value Ref Range Status   01/29/2024  14 0 - 70 U/L Final     Comment:     Reference intervals for this test were updated on 6/12/2023 to more accurately reflect our healthy population. There may be differences in the flagging of prior results with similar values performed with this method. Interpretation of those prior results can be made in the context of the updated reference intervals.     06/07/2021 28 0 - 70 U/L Final     AST   Date Value Ref Range Status   01/29/2024 18 0 - 45 U/L Final     Comment:     Reference intervals for this test were updated on 6/12/2023 to more accurately reflect our healthy population. There may be differences in the flagging of prior results with similar values performed with this method. Interpretation of those prior results can be made in the context of the updated reference intervals.   06/07/2021 27 0 - 45 U/L Final     TSH   Date Value Ref Range Status   09/07/2021 1.89 0.40 - 4.00 mU/L Final   04/16/2018 2.25 0.40 - 4.00 mU/L Final       Cholesterol   Date Value Ref Range Status   01/29/2024 147 <200 mg/dL Final   06/19/2023 152 <200 mg/dL Final   06/07/2021 120 <200 mg/dL Final   12/09/2020 116 <200 mg/dL Final     HDL Cholesterol   Date Value Ref Range Status   06/07/2021 49 >39 mg/dL Final   12/09/2020 47 >39 mg/dL Final     Direct Measure HDL   Date Value Ref Range Status   01/29/2024 47 >=40 mg/dL Final   06/19/2023 47 >=40 mg/dL Final     LDL Cholesterol Calculated   Date Value Ref Range Status   01/29/2024 50 <=100 mg/dL Final   06/19/2023 64 <=100 mg/dL Final   06/07/2021 28 <100 mg/dL Final     Comment:     Desirable:       <100 mg/dl   12/09/2020 26 <100 mg/dL Final     Comment:     Desirable:       <100 mg/dl     Triglycerides   Date Value Ref Range Status   01/29/2024 248 (H) <150 mg/dL Final   06/19/2023 205 (H) <150 mg/dL Final   06/07/2021 215 (H) <150 mg/dL Final     Comment:     Borderline high:  150-199 mg/dl  High:             200-499 mg/dl  Very high:       >499 mg/dl     12/09/2020 214 (H)  <150 mg/dL Final     Comment:     Borderline high:  150-199 mg/dl  High:             200-499 mg/dl  Very high:       >499 mg/dl       Cholesterol/HDL Ratio   Date Value Ref Range Status   08/10/2015 5.0 0.0 - 5.0 Final   04/09/2015 4.0 0.0 - 5.0 Final         ASSESSMENT/PLAN:    1.  TYPE 2 DIABETES MELLITUS: Patient's A1C has improved with addition of Jardiance.  A1C 6.7 % on 1/29/2024.  Continue Jardiance 10 mg each am and Metformin 500 mg 2 tablets each am and 2 tablets each pm.  Again, I reviewed the possible side effects of Jardiance including dehydration, UTI and groin yeast infection.  Also reviewed the cardiovascular and renal benefits of taking a SGLT-2 drug.  Reminded Murray to drink plenty of water.  Most recent creat 1.02 with GFR 65 mL/min in February 2024.  Pt is eating healthy and exercising.  I asked pt to check his FBS each am and 2 hr postmeal blood sugar.  Pt seen by Oph in 9/2023 without retinopathy.  No sx of neuropathy and he denies foot ulcers.  No vitals today.    2.  HX OF ESRD: S/P kidney transplant in Dec 2019.  Most recent creat 1.11 with GFR 72 mL/min in 10/2023.    3.  CARDIAC: S/P heart transplant in 6/2018.    4.  LIPIDS: LDL 50 in January 2024. Pt taking Lipitor.    5.  FOLLOW UP: With me in 6 months.    Accu-Chek Softclix lancets ordered today  Consider graduating from Endocrine Clinic next visit.    Time spent reviewing chart, labs and glucose data today =5  minutes.  Time for video visit today =  16 minutes.  Time for documentation today = 10 minutes.    Total time for visit today = 31 minutes.    Rosa Calvert PA-C

## 2024-05-06 ENCOUNTER — TELEPHONE (OUTPATIENT)
Dept: FAMILY MEDICINE | Facility: CLINIC | Age: 69
End: 2024-05-06
Payer: MEDICARE

## 2024-05-06 NOTE — TELEPHONE ENCOUNTER
Forms/Letter Request    Type of form/letter: Diabetic Supplies diabetic standard written order      Do we have the form/letter: Yes: placed in care team 2 providers sign folder    Who is the form from? [facility] retail medicare department (if other please explain)    Where did/will the form come from? form was faxed in    When is form/letter needed by: n/a    How would you like the form/letter returned: Fax : [phone]

## 2024-05-07 ENCOUNTER — THERAPY VISIT (OUTPATIENT)
Dept: PHYSICAL THERAPY | Facility: CLINIC | Age: 69
End: 2024-05-07
Payer: MEDICARE

## 2024-05-07 DIAGNOSIS — M25.562 ACUTE PAIN OF BOTH KNEES: Primary | ICD-10-CM

## 2024-05-07 DIAGNOSIS — M25.561 ACUTE PAIN OF BOTH KNEES: Primary | ICD-10-CM

## 2024-05-07 PROCEDURE — 97110 THERAPEUTIC EXERCISES: CPT | Mod: GP | Performed by: PHYSICAL THERAPIST

## 2024-05-07 PROCEDURE — 97140 MANUAL THERAPY 1/> REGIONS: CPT | Mod: GP | Performed by: PHYSICAL THERAPIST

## 2024-05-10 NOTE — TELEPHONE ENCOUNTER
Prior Authorization Not Needed per Insurance    Medication: MYCOPHENOLATE MOFETIL (GENERIC EQUIV) 250 MG PO CAPS  Insurance Company:    Expected CoPay: $0 at time of service    Pharmacy Filling the Rx: [id number] - [place], [place] - [address]  Pharmacy Notified: yes  Patient Notified: no- patient already picked up   Patient had medicare at time of transplant.  [employer] should be billed to medicare part B. Pharmacy was able to correct billing

## 2024-05-17 ENCOUNTER — TELEPHONE (OUTPATIENT)
Dept: ENDOCRINOLOGY | Facility: CLINIC | Age: 69
End: 2024-05-17
Payer: MEDICARE

## 2024-05-17 NOTE — TELEPHONE ENCOUNTER
Patient confirmed scheduled appointment:  Date: 11/1/24  Time: 10:30 am  Visit type: return diabetes  Provider: Rosa Calvert  Location: virtual  Testing/imaging: NA  Additional notes: NA

## 2024-05-21 ENCOUNTER — THERAPY VISIT (OUTPATIENT)
Dept: PHYSICAL THERAPY | Facility: CLINIC | Age: 69
End: 2024-05-21
Payer: MEDICARE

## 2024-05-21 DIAGNOSIS — M25.562 ACUTE PAIN OF BOTH KNEES: Primary | ICD-10-CM

## 2024-05-21 DIAGNOSIS — M25.561 ACUTE PAIN OF BOTH KNEES: Primary | ICD-10-CM

## 2024-05-21 PROCEDURE — 97112 NEUROMUSCULAR REEDUCATION: CPT | Mod: GP | Performed by: PHYSICAL THERAPIST

## 2024-05-21 PROCEDURE — 97110 THERAPEUTIC EXERCISES: CPT | Mod: GP | Performed by: PHYSICAL THERAPIST

## 2024-05-28 ENCOUNTER — LAB (OUTPATIENT)
Dept: LAB | Facility: CLINIC | Age: 69
End: 2024-05-28
Payer: MEDICARE

## 2024-05-28 ENCOUNTER — HOSPITAL ENCOUNTER (OUTPATIENT)
Dept: MRI IMAGING | Facility: CLINIC | Age: 69
Discharge: HOME | End: 2024-05-28
Attending: INTERNAL MEDICINE | Admitting: INTERNAL MEDICINE
Payer: MEDICARE

## 2024-05-28 ENCOUNTER — TELEPHONE (OUTPATIENT)
Dept: TRANSPLANT | Facility: CLINIC | Age: 69
End: 2024-05-28

## 2024-05-28 DIAGNOSIS — Z98.890 OTHER SPECIFIED POSTPROCEDURAL STATES: ICD-10-CM

## 2024-05-28 DIAGNOSIS — K86.2 PANCREAS CYST: ICD-10-CM

## 2024-05-28 DIAGNOSIS — Z79.899 ENCOUNTER FOR LONG-TERM CURRENT USE OF MEDICATION: ICD-10-CM

## 2024-05-28 DIAGNOSIS — Z94.0 KIDNEY REPLACED BY TRANSPLANT: ICD-10-CM

## 2024-05-28 DIAGNOSIS — Z94.0 KIDNEY TRANSPLANTED: Primary | ICD-10-CM

## 2024-05-28 DIAGNOSIS — Z48.298 AFTERCARE FOLLOWING ORGAN TRANSPLANT: ICD-10-CM

## 2024-05-28 DIAGNOSIS — Z94.0 KIDNEY REPLACED BY TRANSPLANT: Primary | ICD-10-CM

## 2024-05-28 LAB
ANION GAP SERPL CALCULATED.3IONS-SCNC: 11 MMOL/L (ref 7–15)
BUN SERPL-MCNC: 18.4 MG/DL (ref 8–23)
CALCIUM SERPL-MCNC: 9.8 MG/DL (ref 8.8–10.2)
CHLORIDE SERPL-SCNC: 103 MMOL/L (ref 98–107)
CREAT SERPL-MCNC: 1.01 MG/DL (ref 0.67–1.17)
DEPRECATED HCO3 PLAS-SCNC: 24 MMOL/L (ref 22–29)
EGFRCR SERPLBLD CKD-EPI 2021: 81 ML/MIN/1.73M2
ERYTHROCYTE [DISTWIDTH] IN BLOOD BY AUTOMATED COUNT: 13.2 % (ref 10–15)
GLUCOSE SERPL-MCNC: 134 MG/DL (ref 70–99)
HCT VFR BLD AUTO: 41.6 % (ref 40–53)
HGB BLD-MCNC: 13.7 G/DL (ref 13.3–17.7)
MCH RBC QN AUTO: 29.5 PG (ref 26.5–33)
MCHC RBC AUTO-ENTMCNC: 32.9 G/DL (ref 31.5–36.5)
MCV RBC AUTO: 90 FL (ref 78–100)
PLATELET # BLD AUTO: 223 10E3/UL (ref 150–450)
POTASSIUM SERPL-SCNC: 4.1 MMOL/L (ref 3.4–5.3)
RBC # BLD AUTO: 4.65 10E6/UL (ref 4.4–5.9)
SODIUM SERPL-SCNC: 138 MMOL/L (ref 135–145)
TACROLIMUS BLD-MCNC: 4.2 UG/L (ref 5–15)
TME LAST DOSE: ABNORMAL H
TME LAST DOSE: ABNORMAL H
WBC # BLD AUTO: 4.8 10E3/UL (ref 4–11)

## 2024-05-28 PROCEDURE — 74183 MRI ABD W/O CNTR FLWD CNTR: CPT | Mod: 26 | Performed by: RADIOLOGY

## 2024-05-28 PROCEDURE — 85027 COMPLETE CBC AUTOMATED: CPT | Performed by: PATHOLOGY

## 2024-05-28 PROCEDURE — 80048 BASIC METABOLIC PNL TOTAL CA: CPT | Performed by: PATHOLOGY

## 2024-05-28 PROCEDURE — G1010 CDSM STANSON: HCPCS | Performed by: RADIOLOGY

## 2024-05-28 PROCEDURE — 80197 ASSAY OF TACROLIMUS: CPT | Performed by: INTERNAL MEDICINE

## 2024-05-28 PROCEDURE — 74183 MRI ABD W/O CNTR FLWD CNTR: CPT | Mod: MG

## 2024-05-28 PROCEDURE — A9585 GADOBUTROL INJECTION: HCPCS | Performed by: INTERNAL MEDICINE

## 2024-05-28 PROCEDURE — 999N000248 HC STATISTIC IV INSERT WITH US BY RN

## 2024-05-28 PROCEDURE — 99000 SPECIMEN HANDLING OFFICE-LAB: CPT | Performed by: PATHOLOGY

## 2024-05-28 PROCEDURE — 36415 COLL VENOUS BLD VENIPUNCTURE: CPT | Performed by: PATHOLOGY

## 2024-05-28 PROCEDURE — 255N000002 HC RX 255 OP 636: Performed by: INTERNAL MEDICINE

## 2024-05-28 RX ORDER — GADOBUTROL 604.72 MG/ML
7.5 INJECTION INTRAVENOUS ONCE
Status: DISCONTINUED | OUTPATIENT
Start: 2024-05-28 | End: 2024-05-29 | Stop reason: HOSPADM

## 2024-05-28 RX ORDER — GADOBUTROL 604.72 MG/ML
10 INJECTION INTRAVENOUS ONCE
Status: COMPLETED | OUTPATIENT
Start: 2024-05-28 | End: 2024-05-28

## 2024-05-28 RX ADMIN — GADOBUTROL 8 ML: 604.72 INJECTION INTRAVENOUS at 13:54

## 2024-05-29 ENCOUNTER — MYC MEDICAL ADVICE (OUTPATIENT)
Dept: NEPHROLOGY | Facility: CLINIC | Age: 69
End: 2024-05-29
Payer: MEDICARE

## 2024-05-30 ENCOUNTER — MYC MEDICAL ADVICE (OUTPATIENT)
Dept: CARDIOLOGY | Facility: CLINIC | Age: 69
End: 2024-05-30
Payer: MEDICARE

## 2024-06-04 ENCOUNTER — PRE VISIT (OUTPATIENT)
Dept: TRANSPLANT | Facility: CLINIC | Age: 69
End: 2024-06-04
Payer: MEDICARE

## 2024-06-04 ENCOUNTER — TELEPHONE (OUTPATIENT)
Dept: TRANSPLANT | Facility: CLINIC | Age: 69
End: 2024-06-04
Payer: MEDICARE

## 2024-06-04 DIAGNOSIS — Z13.6 ENCOUNTER FOR LIPID SCREENING FOR CARDIOVASCULAR DISEASE: ICD-10-CM

## 2024-06-04 DIAGNOSIS — Z13.220 ENCOUNTER FOR LIPID SCREENING FOR CARDIOVASCULAR DISEASE: ICD-10-CM

## 2024-06-04 DIAGNOSIS — Z94.1 HEART REPLACED BY TRANSPLANT (H): Primary | ICD-10-CM

## 2024-06-04 DIAGNOSIS — Z12.5 PROSTATE CANCER SCREENING: ICD-10-CM

## 2024-06-04 DIAGNOSIS — Z79.899 ENCOUNTER FOR LONG-TERM (CURRENT) USE OF MEDICATIONS: ICD-10-CM

## 2024-06-04 RX ORDER — POTASSIUM CHLORIDE 1500 MG/1
40 TABLET, EXTENDED RELEASE ORAL
Status: CANCELLED | OUTPATIENT
Start: 2024-06-04

## 2024-06-04 RX ORDER — ASPIRIN 325 MG
325 TABLET ORAL ONCE
Status: CANCELLED | OUTPATIENT
Start: 2024-06-04 | End: 2024-06-04

## 2024-06-04 RX ORDER — POTASSIUM CHLORIDE 1500 MG/1
20 TABLET, EXTENDED RELEASE ORAL
Status: CANCELLED | OUTPATIENT
Start: 2024-06-04

## 2024-06-04 RX ORDER — ASPIRIN 81 MG/1
243 TABLET, CHEWABLE ORAL ONCE
Status: CANCELLED | OUTPATIENT
Start: 2024-06-04

## 2024-06-04 RX ORDER — LIDOCAINE 40 MG/G
CREAM TOPICAL
Status: CANCELLED | OUTPATIENT
Start: 2024-06-04

## 2024-06-04 NOTE — CONSULTS
"Diabetes Education  Received consult request to see this 63 year old male for diabetes education.  Patient with history of Type 2 diabetes, HFrEF 10-15%, ascending aortic aneurysm (4.2cm), bicuspid aortic valve, CAD , admitted from ECHO lab for shortness of breath and worsening EF.   Patient was taking glipizide prior to admission.  His A1c was 9.1% 1/8/18, down to 8.6% on 2/2/18.  Since admission, his glipizide has been discontinued, and is currently receiving insulin aspart with a medium correction scale.  Met with patient.  He states since his diabetes diagnosis, he has not had any formal diabetes education.  He did have a blood glucose monitor, but has not tested recently.  States he never was given guidance as to blood glucose targets, or how to respond to glucose results.  Today discussed pre-meal blood glucose target of  mg/dL.  Provided with, and instructed on, use of an Accu-chek Guide monitor kit.  He did a return demonstration without difficulty.  As he had just received his lunch tray, finished our session for today.  Will return to see regarding insulin administration education.  Provided with the [facility] \"Understanding Diabetes\" booklet.    Kristal Urbina MS RN CDE CDTC  [phone]       " 16

## 2024-06-04 NOTE — TELEPHONE ENCOUNTER
Pre-procedure instructions - Coronary Angiogram  Patient Education    On June 12th your arrival time is 7:30am[place] [address]  You will need to have Fasting Labs with a drug level  Please, take your Tacro at 7:30pm on June 11th for an accurate 12 hour trough  Do not take your morning medication until after your lab draw  Do not take Metformin the day of, or for 2 days after your Cath Lab procedure. Resume taking on June 15th.   Do not take your Eliquis starting 2 days before the procedure (June 10th), last dose on June 9th. Resume on June 13th.  After lab you will have a chest xray, echo, and then cath lab.  Please plan on being at the hospital all day.  At any time, emergencies and/or urgent cases may come up which could delay the start of your procedure  On June 24th your arrival time is 9:45am.  [place] [address]  Clinic appointment with EARL Burger      Pre-procedure instructions - Coronary Angiogram  Shower in the evening before or the morning of the procedure  No solid food for 8 hours prior and nothing to drink 2 hours prior to arrival time  You can take your morning medications (except for diabetic and blood thinners) with sips of water.  Take 325 mg of Asprin 24 hours prior to the procedure and the morning of procedure.   You will need to arrange a ride to drop you off and pick you up, as you will be unable to drive home.  Prior to discharge you may be required to lay flat for approximately 2-4 hours in the recovery unit to ensure proper clotting of the artery. You will need a responsible adult to stay with you for 24 hours post-procedure.             Diabetic Medication Instructions  Hold oral diabetic medication in morning of your procedure and for 48 hours after IV contrast is given (METFORMIN)  Typical instructions for insulin diabetic  medication holding are below. However, please reach out to your Primary Care Provider or Endocrinologist for specific instructions  DO NOT take any oral diabetic medication, short-acting diabetes medications/insulin, humalog or regular insulin the morning of your test  Take   dose of long-acting insulin (Lantus, Levemir) the day of your test  Remember to bring your glucometer and insulin with you to take after your test if needed    No need to hold Jardiance per cath lab    Anticoagulation Medication Instructions   apixaban (ELIQUIS) - Hold 48 hours prior to procedure    Please call your transplant coordinator at [phone] with any questions or concerns.  Please note: after hours, weekends and holidays, this phone number is routed to an  to page out the coordinator on call.

## 2024-06-11 ENCOUNTER — THERAPY VISIT (OUTPATIENT)
Dept: PHYSICAL THERAPY | Facility: CLINIC | Age: 69
End: 2024-06-11
Payer: MEDICARE

## 2024-06-11 DIAGNOSIS — M25.561 ACUTE PAIN OF BOTH KNEES: Primary | ICD-10-CM

## 2024-06-11 DIAGNOSIS — M25.562 ACUTE PAIN OF BOTH KNEES: Primary | ICD-10-CM

## 2024-06-11 PROCEDURE — 97112 NEUROMUSCULAR REEDUCATION: CPT | Mod: GP | Performed by: PHYSICAL THERAPIST

## 2024-06-11 PROCEDURE — 97110 THERAPEUTIC EXERCISES: CPT | Mod: GP | Performed by: PHYSICAL THERAPIST

## 2024-06-12 ENCOUNTER — ANCILLARY PROCEDURE (OUTPATIENT)
Dept: GENERAL RADIOLOGY | Facility: CLINIC | Age: 69
End: 2024-06-12
Payer: MEDICARE

## 2024-06-12 ENCOUNTER — ANCILLARY PROCEDURE (OUTPATIENT)
Dept: CARDIOLOGY | Facility: CLINIC | Age: 69
End: 2024-06-12
Payer: MEDICARE

## 2024-06-12 ENCOUNTER — APPOINTMENT (OUTPATIENT)
Dept: MEDSURG UNIT | Facility: CLINIC | Age: 69
End: 2024-06-12
Attending: INTERNAL MEDICINE
Payer: MEDICARE

## 2024-06-12 ENCOUNTER — HOSPITAL ENCOUNTER (OUTPATIENT)
Facility: CLINIC | Age: 69
Discharge: HOME | End: 2024-06-12
Attending: INTERNAL MEDICINE | Admitting: INTERNAL MEDICINE
Payer: MEDICARE

## 2024-06-12 ENCOUNTER — TELEPHONE (OUTPATIENT)
Dept: TRANSPLANT | Facility: CLINIC | Age: 69
End: 2024-06-12

## 2024-06-12 ENCOUNTER — LAB (OUTPATIENT)
Dept: LAB | Facility: CLINIC | Age: 69
End: 2024-06-12
Attending: INTERNAL MEDICINE
Payer: MEDICARE

## 2024-06-12 VITALS
WEIGHT: 170 LBS | RESPIRATION RATE: 16 BRPM | DIASTOLIC BLOOD PRESSURE: 100 MMHG | HEIGHT: 68 IN | BODY MASS INDEX: 25.76 KG/M2 | TEMPERATURE: 97.7 F | HEART RATE: 85 BPM | SYSTOLIC BLOOD PRESSURE: 160 MMHG | OXYGEN SATURATION: 100 %

## 2024-06-12 DIAGNOSIS — Z94.1 HEART REPLACED BY TRANSPLANT (H): ICD-10-CM

## 2024-06-12 DIAGNOSIS — Z13.220 ENCOUNTER FOR LIPID SCREENING FOR CARDIOVASCULAR DISEASE: ICD-10-CM

## 2024-06-12 DIAGNOSIS — Z13.6 ENCOUNTER FOR LIPID SCREENING FOR CARDIOVASCULAR DISEASE: ICD-10-CM

## 2024-06-12 DIAGNOSIS — Z79.899 ENCOUNTER FOR LONG-TERM (CURRENT) USE OF MEDICATIONS: ICD-10-CM

## 2024-06-12 DIAGNOSIS — Z12.5 PROSTATE CANCER SCREENING: ICD-10-CM

## 2024-06-12 LAB
ALBUMIN SERPL BCG-MCNC: 4.5 G/DL (ref 3.5–5.2)
ALP SERPL-CCNC: 148 U/L (ref 40–150)
ALT SERPL W P-5'-P-CCNC: 17 U/L (ref 0–70)
ANION GAP SERPL CALCULATED.3IONS-SCNC: 11 MMOL/L (ref 7–15)
AST SERPL W P-5'-P-CCNC: 24 U/L (ref 0–45)
BILIRUB SERPL-MCNC: 1.1 MG/DL
BUN SERPL-MCNC: 14.8 MG/DL (ref 8–23)
CALCIUM SERPL-MCNC: 9.8 MG/DL (ref 8.8–10.2)
CHLORIDE SERPL-SCNC: 104 MMOL/L (ref 98–107)
CHOLEST SERPL-MCNC: 148 MG/DL
CK SERPL-CCNC: 259 U/L (ref 39–308)
CMV DNA SPEC NAA+PROBE-ACNC: NOT DETECTED IU/ML
CREAT SERPL-MCNC: 0.99 MG/DL (ref 0.67–1.17)
DEPRECATED HCO3 PLAS-SCNC: 23 MMOL/L (ref 22–29)
EBV DNA SERPL NAA+PROBE-ACNC: NOT DETECTED IU/ML
EGFRCR SERPLBLD CKD-EPI 2021: 82 ML/MIN/1.73M2
ERYTHROCYTE [DISTWIDTH] IN BLOOD BY AUTOMATED COUNT: 13.2 % (ref 10–15)
FASTING STATUS PATIENT QL REPORTED: YES
FASTING STATUS PATIENT QL REPORTED: YES
GLUCOSE SERPL-MCNC: 146 MG/DL (ref 70–99)
HBA1C MFR BLD: 6.5 %
HCT VFR BLD AUTO: 42.4 % (ref 40–53)
HDLC SERPL-MCNC: 51 MG/DL
HGB BLD-MCNC: 14.1 G/DL (ref 13.3–17.7)
LDLC SERPL CALC-MCNC: 62 MG/DL
LVEF ECHO: NORMAL
MAGNESIUM SERPL-MCNC: 1.7 MG/DL (ref 1.7–2.3)
MCH RBC QN AUTO: 29.7 PG (ref 26.5–33)
MCHC RBC AUTO-ENTMCNC: 33.3 G/DL (ref 31.5–36.5)
MCV RBC AUTO: 89 FL (ref 78–100)
NONHDLC SERPL-MCNC: 97 MG/DL
PHOSPHATE SERPL-MCNC: 3.3 MG/DL (ref 2.5–4.5)
PLATELET # BLD AUTO: 228 10E3/UL (ref 150–450)
POTASSIUM SERPL-SCNC: 3.9 MMOL/L (ref 3.4–5.3)
PROT SERPL-MCNC: 7.7 G/DL (ref 6.4–8.3)
PSA SERPL DL<=0.01 NG/ML-MCNC: 4.76 NG/ML (ref 0–4.5)
RBC # BLD AUTO: 4.75 10E6/UL (ref 4.4–5.9)
SODIUM SERPL-SCNC: 138 MMOL/L (ref 135–145)
TACROLIMUS BLD-MCNC: 3.8 UG/L (ref 5–15)
TME LAST DOSE: ABNORMAL H
TME LAST DOSE: ABNORMAL H
TRIGL SERPL-MCNC: 176 MG/DL
WBC # BLD AUTO: 4.3 10E3/UL (ref 4–11)

## 2024-06-12 PROCEDURE — 250N000009 HC RX 250: Performed by: INTERNAL MEDICINE

## 2024-06-12 PROCEDURE — 99000 SPECIMEN HANDLING OFFICE-LAB: CPT | Performed by: PATHOLOGY

## 2024-06-12 PROCEDURE — 86832 HLA CLASS I HIGH DEFIN QUAL: CPT | Performed by: NURSE PRACTITIONER

## 2024-06-12 PROCEDURE — 86352 CELL FUNCTION ASSAY W/STIM: CPT | Performed by: NURSE PRACTITIONER

## 2024-06-12 PROCEDURE — 93306 TTE W/DOPPLER COMPLETE: CPT | Performed by: INTERNAL MEDICINE

## 2024-06-12 PROCEDURE — 80053 COMPREHEN METABOLIC PANEL: CPT | Performed by: PATHOLOGY

## 2024-06-12 PROCEDURE — 999N000054 HC STATISTIC EKG NON-CHARGEABLE

## 2024-06-12 PROCEDURE — 87799 DETECT AGENT NOS DNA QUANT: CPT | Performed by: NURSE PRACTITIONER

## 2024-06-12 PROCEDURE — G0103 PSA SCREENING: HCPCS | Performed by: PATHOLOGY

## 2024-06-12 PROCEDURE — 36415 COLL VENOUS BLD VENIPUNCTURE: CPT | Performed by: PATHOLOGY

## 2024-06-12 PROCEDURE — 999N000142 HC STATISTIC PROCEDURE PREP ONLY

## 2024-06-12 PROCEDURE — 84100 ASSAY OF PHOSPHORUS: CPT | Performed by: PATHOLOGY

## 2024-06-12 PROCEDURE — 71046 X-RAY EXAM CHEST 2 VIEWS: CPT | Performed by: RADIOLOGY

## 2024-06-12 PROCEDURE — 80197 ASSAY OF TACROLIMUS: CPT | Performed by: NURSE PRACTITIONER

## 2024-06-12 PROCEDURE — 83735 ASSAY OF MAGNESIUM: CPT | Performed by: PATHOLOGY

## 2024-06-12 PROCEDURE — 85027 COMPLETE CBC AUTOMATED: CPT | Performed by: PATHOLOGY

## 2024-06-12 PROCEDURE — 80061 LIPID PANEL: CPT | Performed by: PATHOLOGY

## 2024-06-12 PROCEDURE — 83036 HEMOGLOBIN GLYCOSYLATED A1C: CPT | Performed by: NURSE PRACTITIONER

## 2024-06-12 PROCEDURE — 86833 HLA CLASS II HIGH DEFIN QUAL: CPT | Performed by: NURSE PRACTITIONER

## 2024-06-12 PROCEDURE — 82550 ASSAY OF CK (CPK): CPT | Performed by: PATHOLOGY

## 2024-06-12 RX ORDER — POTASSIUM CHLORIDE 750 MG/1
40 TABLET, EXTENDED RELEASE ORAL
Status: DISCONTINUED | OUTPATIENT
Start: 2024-06-12 | End: 2024-06-12 | Stop reason: HOSPADM

## 2024-06-12 RX ORDER — ASPIRIN 325 MG
325 TABLET ORAL ONCE
Status: COMPLETED | OUTPATIENT
Start: 2024-06-12 | End: 2024-06-12

## 2024-06-12 RX ORDER — ASPIRIN 81 MG/1
243 TABLET, CHEWABLE ORAL ONCE
Status: COMPLETED | OUTPATIENT
Start: 2024-06-12 | End: 2024-06-12

## 2024-06-12 RX ORDER — LIDOCAINE 40 MG/G
CREAM TOPICAL
Status: DISCONTINUED | OUTPATIENT
Start: 2024-06-12 | End: 2024-06-12 | Stop reason: HOSPADM

## 2024-06-12 RX ORDER — POTASSIUM CHLORIDE 750 MG/1
20 TABLET, EXTENDED RELEASE ORAL
Status: DISCONTINUED | OUTPATIENT
Start: 2024-06-12 | End: 2024-06-12 | Stop reason: HOSPADM

## 2024-06-12 RX ADMIN — LIDOCAINE: 40 CREAM TOPICAL at 11:29

## 2024-06-12 ASSESSMENT — ENCOUNTER SYMPTOMS
SHORTNESS OF BREATH: 0
ABDOMINAL PAIN: 0
ABDOMINAL DISTENTION: 0

## 2024-06-12 ASSESSMENT — ACTIVITIES OF DAILY LIVING (ADL)
ADLS_ACUITY_SCORE: 38

## 2024-06-12 NOTE — TELEPHONE ENCOUNTER
Per WING Bacon., [facility] Heart Cath.,Cath lab had some emergencies) Murray came and did not want to wait around and left without having his heart cath done., (under Dr. Suh)  Murray will have to have another appt rescheduled for this procedure.

## 2024-06-12 NOTE — RESULT ENCOUNTER NOTE
Per report, LVEF 60-65%. Results stable. Results will be discussed with patient at upcoming clinic appt.

## 2024-06-12 NOTE — PRE-PROCEDURE
GENERAL PRE-PROCEDURE:   Procedure:  Right heart catheterization, coronary angiogram, percutaneous coronary intervention  Date/Time:  6/12/2024 12:17 PM    Written consent obtained?: Yes    Risks and benefits: Risks, benefits and alternatives were discussed    DC Plan: Appropriate discharge home plan in place for patients who are going home after procedure   Consent given by:  Patient  Patient states understanding of procedure being performed: Yes    Patient's understanding of procedure matches consent: Yes    Procedure consent matches procedure scheduled: Yes    Expected level of sedation:  Moderate  Appropriately NPO:  Yes  ASA Class:  4  Mallampati  :  Grade 2- soft palate, base of uvula, tonsillar pillars, and portion of posterior pharyngeal wall visible  Lungs:  Lungs clear with good breath sounds bilaterally  Heart:  Normal heart sounds and rate  History & Physical reviewed:  Abbreviated history and physical done prior to moderate sedation  H&P Comments:  Clinically Significant Risk Factors Present on Admission    Cardiovascular : Not present on admission    Fluid & Electrolyte Disorders : Not present on admission    Gastroenterology : Not present on admission    Hematology/Oncology : Not present on admission    Nephrology: CKD POA List: ESRD on dialysis- now s/p Kidney Txp    Neurology : Not present on admission    Pulmonology : Not present on admission    Systemic : Not present on admission    [unfilled]    Statement of review:  I have reviewed the lab findings, diagnostic data, medications, and the plan for sedation

## 2024-06-12 NOTE — RESULT ENCOUNTER NOTE
Chest Xray results to be discussed at upcoming clinic appointment on 6/24/24. Per report, CT of chest recommended for FEB 2025.

## 2024-06-12 NOTE — PROGRESS NOTES
Patient decided to cancel procedure. He has been here since the morning and has been bumped multiple times because of emergencies and he does not want to wait anymore. Dr. King came to speak with patient and Murray decided to cancel today and reschedule for another day. PIV removed. Lidocaine cream removed from bilateral neck sites. He ambulated off unit with all belongings at 1525.

## 2024-06-12 NOTE — H&P
Murray Nicholson is an 69 year old male. His PMH includesorthotopic heart transplantation on 06/14/2018 and kidney transplant in 12/2019. His current problem list includes:      1.  Status post orthotopic heart transplantation.   2.  Neutropenia  3.  Oral mucosal ulcer secondary to neutropenia with Klebsiella infection.   4.  Pseudomonas bacteremia, resolved.   5.  Perforation of the small bowel, status post small-bowel resection and ileostomy.   6.  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   7.  Hypertension.   8.  Hyperlipidemia.   9.  End-stage renal disease requiring hemodialysis - S/P Kidney transplant in Dec 2019  10. DVT November 2023 provoked now s/p 3 months AC with apixaban    Past Medical History:   Diagnosis Date    (HFpEF) heart failure with preserved ejection fraction (H)     Allergic rhinitis, cause unspecified     Anemia of chronic kidney failure     Aortic stenosis 08/13/2008    Overview:  Bicuspid aortic valve    AS (aortic stenosis)     Ascending aortic aneurysm (H24)     Bicuspid aortic valve     Bilateral hand pain 06/01/2021    CAD (coronary artery disease)     Congestive heart failure, unspecified     Coronary artery disease involving native artery of transplanted heart without angina pectoris 07/10/2014    Dialysis patient (H24)     Tues-Thur-Sat    Dyslipidemia     Esophageal reflux     ESRD (end stage renal disease) (H)     Hearing problem     Heart replaced by transplant (H) 12/10/2018    Hypersomnia with sleep apnea, unspecified     Hypertension     Ileostomy status (H)     Immunosuppression (H24)     MGUS (monoclonal gammopathy of unknown significance)     Mitral regurgitation     SHEELA (obstructive sleep apnea)     No CPAP    Pneumonia     Sigmoid diverticulitis 08/14/2018    Status post coronary angiogram 06/28/2019    Systolic heart failure (H)     Type 2 diabetes mellitus (H)     Ventral hernia without obstruction or gangrene        Allergies:   Allergies   Allergen Reactions    Cats Shortness Of Breath  "and Anaphylaxis    Penicillins Hives    Shrimp Shortness Of Breath and Anaphylaxis    Isosorbide Nitrate      hypotension    Norco [Hydrocodone-Acetaminophen] Nausea and Vomiting       Active Problems:    * No active hospital problems. *    Blood pressure (!) 160/100, pulse 85, temperature 97.7  F (36.5  C), resp. rate 16, height 1.727 m (5' 8\"), weight 77.1 kg (170 lb), SpO2 100%.    Review of Systems   Respiratory:  Negative for shortness of breath.    Cardiovascular:  Negative for chest pain.   Gastrointestinal:  Negative for abdominal distention and abdominal pain.   All other systems reviewed and are negative.      Physical Exam  Vitals and nursing note reviewed.   Constitutional:       Appearance: Normal appearance.   HENT:      Nose: Nose normal.   Eyes:      Pupils: Pupils are equal, round, and reactive to light.   Cardiovascular:      Rate and Rhythm: Normal rate and regular rhythm.   Pulmonary:      Effort: Pulmonary effort is normal.   Neurological:      General: No focal deficit present.      Mental Status: He is alert and oriented to person, place, and time.         Assessment & Plan:  # S/P OHT  - Right catheterization and coronary angiogram for OHT surveillance  - Potential PCI, may need DAPT  - Bedrest for hemostasis if groin access, TR band if radial access  - Post sedation monitoring  - Rest of care per primary team      VERONICA Contreras CNP  6/12/2024  "

## 2024-06-12 NOTE — PRE-PROCEDURE
Consenting/Education for Cardiology Procedure: Right heart catheterization , Possible percutaneous intervention, and Coronary angiogram    Patient understands we would like to perform the listed procedure(s) due to heart transplant surveillance.    The patient understands the following:     The procedure was described to the patient in detail.    Moderate sedation is required for this procedure and the risks, benefits and alternatives to moderate sedation were discussed. Patient also understands risks and complications of the procedure which include but are not limited to bruising/swelling around the incision site, infection, bleeding, allergic reaction to local anesthetic, air embolism, arterial puncture, stroke, heart attack, need for emergency surgery, death.    Patient verbalized understanding of risks and benefits and has elected to proceed with the procedure or procedures listed above.    VERONICA Contreras CNP  Cardiology

## 2024-06-12 NOTE — PROGRESS NOTES
Pt arrived to [facility] from home for CORS/RHC. VSS. Denies pain. Consent obtained. Lab resulted. H&P needs to be updated. Allergies reviewed with pt. Appropriately NPO. Prep completed. LUE with old graft site for HD, limb alert applied. 325mg Aspirin taken prior to admission, PIV infusing NS at 150mL/hr, groin prep complete, pedal pulses marked. Naresh (friend); will be transporting patient home post procedure.

## 2024-06-12 NOTE — TELEPHONE ENCOUNTER
Writer called patient to discuss latest Tacrolimus level.     Tacrolimus is 3.8 and below goal (4-6). ~12 hr 20 min trough, per lab details and pt confirmed. Pt has been therapeutic at this dose recently. No changes to be made to current Tacro dose. Will request the patient retest his Tacro in the next several days. Patient agrees to retest Tacro on Friday (6/14)  at the [facility] at 7:15 AM.

## 2024-06-13 LAB
ATRIAL RATE - MUSE: 81 BPM
DIASTOLIC BLOOD PRESSURE - MUSE: NORMAL MMHG
INTERPRETATION ECG - MUSE: NORMAL
P AXIS - MUSE: 52 DEGREES
PR INTERVAL - MUSE: 192 MS
QRS DURATION - MUSE: 86 MS
QT - MUSE: 390 MS
QTC - MUSE: 453 MS
R AXIS - MUSE: 54 DEGREES
SYSTOLIC BLOOD PRESSURE - MUSE: NORMAL MMHG
T AXIS - MUSE: 47 DEGREES
VENTRICULAR RATE- MUSE: 81 BPM

## 2024-06-13 NOTE — PLAN OF CARE
Problem: Patient Care Overview  Goal: Plan of Care/Patient Progress Review  OT/6C: Cancel. Pt at dialysis this AM, due to scheduling conflict unable to return this PM. Pt is rescheduled to tomorrow.       Yun JAMA

## 2024-06-14 ENCOUNTER — LAB (OUTPATIENT)
Dept: LAB | Facility: CLINIC | Age: 69
End: 2024-06-14
Payer: MEDICARE

## 2024-06-14 DIAGNOSIS — Z79.899 ENCOUNTER FOR LONG-TERM CURRENT USE OF MEDICATION: ICD-10-CM

## 2024-06-14 DIAGNOSIS — Z98.890 OTHER SPECIFIED POSTPROCEDURAL STATES: ICD-10-CM

## 2024-06-14 DIAGNOSIS — Z48.298 AFTERCARE FOLLOWING ORGAN TRANSPLANT: ICD-10-CM

## 2024-06-14 DIAGNOSIS — Z94.0 KIDNEY REPLACED BY TRANSPLANT: ICD-10-CM

## 2024-06-14 LAB
ANION GAP SERPL CALCULATED.3IONS-SCNC: 10 MMOL/L (ref 7–15)
BUN SERPL-MCNC: 15.6 MG/DL (ref 8–23)
CALCIUM SERPL-MCNC: 9.8 MG/DL (ref 8.8–10.2)
CHLORIDE SERPL-SCNC: 105 MMOL/L (ref 98–107)
CREAT SERPL-MCNC: 0.98 MG/DL (ref 0.67–1.17)
DEPRECATED HCO3 PLAS-SCNC: 24 MMOL/L (ref 22–29)
EGFRCR SERPLBLD CKD-EPI 2021: 83 ML/MIN/1.73M2
ERYTHROCYTE [DISTWIDTH] IN BLOOD BY AUTOMATED COUNT: 13.2 % (ref 10–15)
GLUCOSE SERPL-MCNC: 146 MG/DL (ref 70–99)
HCT VFR BLD AUTO: 41.9 % (ref 40–53)
HGB BLD-MCNC: 13.7 G/DL (ref 13.3–17.7)
MCH RBC QN AUTO: 29.6 PG (ref 26.5–33)
MCHC RBC AUTO-ENTMCNC: 32.7 G/DL (ref 31.5–36.5)
MCV RBC AUTO: 91 FL (ref 78–100)
PLATELET # BLD AUTO: 228 10E3/UL (ref 150–450)
POTASSIUM SERPL-SCNC: 4.3 MMOL/L (ref 3.4–5.3)
RBC # BLD AUTO: 4.63 10E6/UL (ref 4.4–5.9)
SODIUM SERPL-SCNC: 139 MMOL/L (ref 135–145)
TACROLIMUS BLD-MCNC: 4.5 UG/L (ref 5–15)
TME LAST DOSE: ABNORMAL H
TME LAST DOSE: ABNORMAL H
WBC # BLD AUTO: 5.2 10E3/UL (ref 4–11)

## 2024-06-14 PROCEDURE — 80048 BASIC METABOLIC PNL TOTAL CA: CPT | Performed by: PATHOLOGY

## 2024-06-14 PROCEDURE — 80197 ASSAY OF TACROLIMUS: CPT | Performed by: INTERNAL MEDICINE

## 2024-06-14 PROCEDURE — 36415 COLL VENOUS BLD VENIPUNCTURE: CPT | Performed by: PATHOLOGY

## 2024-06-14 PROCEDURE — 85027 COMPLETE CBC AUTOMATED: CPT | Performed by: PATHOLOGY

## 2024-06-14 PROCEDURE — 99000 SPECIMEN HANDLING OFFICE-LAB: CPT | Performed by: PATHOLOGY

## 2024-06-14 NOTE — RESULT ENCOUNTER NOTE
Tacrolimus 4.5 and within goal (4-6). ~11 hr 42 min trough. No changes to be made to current Tacrolimus dose of 0.5 mg, twice per day. Patient informed via My[facility] message.

## 2024-06-17 LAB — IMMUKNOW IMMUNE CELL FUNCTION: 178 NG/ML

## 2024-06-17 NOTE — RESULT ENCOUNTER NOTE
Immuknow level low at 178. However, this seems to be baseline for the patient. Results to be discussed at upcoming appt with Ramya Suh NP next week.

## 2024-06-20 DIAGNOSIS — Z94.1 HEART REPLACED BY TRANSPLANT (H): Primary | ICD-10-CM

## 2024-06-24 ENCOUNTER — OFFICE VISIT (OUTPATIENT)
Dept: CARDIOLOGY | Facility: CLINIC | Age: 69
End: 2024-06-24
Attending: NURSE PRACTITIONER
Payer: MEDICARE

## 2024-06-24 ENCOUNTER — LAB REQUISITION (OUTPATIENT)
Dept: LAB | Facility: CLINIC | Age: 69
End: 2024-06-24
Payer: MEDICARE

## 2024-06-24 VITALS
DIASTOLIC BLOOD PRESSURE: 68 MMHG | SYSTOLIC BLOOD PRESSURE: 100 MMHG | BODY MASS INDEX: 26 KG/M2 | OXYGEN SATURATION: 99 % | HEART RATE: 95 BPM | WEIGHT: 171 LBS

## 2024-06-24 DIAGNOSIS — R91.1 LUNG NODULE: ICD-10-CM

## 2024-06-24 DIAGNOSIS — Z94.1 HEART REPLACED BY TRANSPLANT (H): Primary | ICD-10-CM

## 2024-06-24 DIAGNOSIS — I10 PRIMARY HYPERTENSION: ICD-10-CM

## 2024-06-24 DIAGNOSIS — D84.9 IMMUNOSUPPRESSED STATUS (H): ICD-10-CM

## 2024-06-24 DIAGNOSIS — I25.811 CORONARY ARTERY DISEASE INVOLVING TRANSPLANTED HEART WITHOUT ANGINA PECTORIS, UNSPECIFIED VESSEL OR LESION TYPE: ICD-10-CM

## 2024-06-24 DIAGNOSIS — Z94.0 KIDNEY REPLACED BY TRANSPLANT: ICD-10-CM

## 2024-06-24 LAB
DONOR IDENTIFICATION: NORMAL
DONOR IDENTIFICATION: NORMAL
DSA COMMENTS: NORMAL
DSA COMMENTS: NORMAL
DSA PRESENT: NO
DSA PRESENT: NO
DSA TEST METHOD: NORMAL
DSA TEST METHOD: NORMAL
ORGAN: NORMAL
ORGAN: NORMAL
SA 1  COMMENTS: NORMAL
SA 1 CELL: NORMAL
SA 1 TEST METHOD: NORMAL
SA 2 CELL: NORMAL
SA 2 COMMENTS: NORMAL
SA 2 TEST METHOD: NORMAL
SA1 HI RISK ABY: NORMAL
SA1 MOD RISK ABY: NORMAL
SA2 HI RISK ABY: NORMAL
SA2 MOD RISK ABY: NORMAL
UNACCEPTABLE ANTIGENS: NORMAL
UNOS CPRA: 1

## 2024-06-24 PROCEDURE — G0463 HOSPITAL OUTPT CLINIC VISIT: HCPCS | Performed by: NURSE PRACTITIONER

## 2024-06-24 PROCEDURE — 99215 OFFICE O/P EST HI 40 MIN: CPT | Performed by: NURSE PRACTITIONER

## 2024-06-24 RX ORDER — BIOTIN 10000 MCG
10 CAPSULE ORAL DAILY
COMMUNITY

## 2024-06-24 RX ORDER — UREA 10 %
500 LOTION (ML) TOPICAL DAILY
COMMUNITY

## 2024-06-24 ASSESSMENT — PAIN SCALES - GENERAL: PAINLEVEL: NO PAIN (0)
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
## 2024-06-24 NOTE — NURSING NOTE
Transplant Coordinator Note    Reason for visit: 6th Annual post transplant protocol   Coordinator: Present     Health concerns addressed today:  1. Pt has an ascending aortic aneurysm. Chest Xray shows nodule in left lung. CT of the chest due in February 2025.   2. PSA is elevated at 4.76 and A1C elevated. PSA is to be repeated in 6 months. Patient to follow up with his PCP.   3. Coronary angiogram and RHC rescheduled for 7/15/24.   4. ECHO  WNL.     Immuknow level low at 178. However, this seems to be baseline for the patient. Results to be discussed at upcoming appt with Ramya Suh NP next week.     Apixaban 5 mg BID - post DVT    Ask Dr. RAPP about angio next year     Immunosuppressants:  Tacrolimus - 0.5 mg BID (Goal 4-6)  Mycophenolate 500 mg BID    Routine screenings:    Derm: Upcoming appointment on 7/22/24.   Dental: Up to date, per patient.   Eye: To schedule.   Colonoscopy: Last on 5/26/23. Pending pathology results, repeat ion 5 years.   Prostate: PSA elevated at 4.76 on 6/12/24.   Immunizations:   Flu: 9/22/23  Covid 19: 4/10/2024  Pneumonia: 8/19/2021  Shingles: 3/3 on 12/10/2020   RSV: 12/15/2023     Labs:       Additional Notes:       Resulted labs, Xray and ECHO reviewed with patient.  Medication record reviewed and reconciled.  Questions and concerns addressed.  AVS discussed and ____.  Pt verbalized an understanding of plan of care.     Patient Instructions  1. Keep up the great work! :)  2. Please continue to monitor your blood pressure at home. Please let us know if it is above 130's/80's, consistently, or less than 110's/60's.    Next transplant clinic appointment:  Annual appointment in June 2025   Next lab draw: Dependent on pending results. Also, labs in 6 months.   Coordinator will call with all pending results.     Please call transplant coordinator with any questions:  Marianne Vogt, MSN, RN, PHN  Heart Transplant Coordinator   Office: [phone]

## 2024-06-24 NOTE — PATIENT INSTRUCTIONS
Patient Instructions  1. Keep up the great work! :)  2. Please continue to monitor your blood pressure at home. Please let us know if it is above 130's/80's, consistently, or less than 110's/60's.    Next transplant clinic appointment:  Annual appointment in June 2025   Next lab draw: Dependent on pending results. Also, labs in 6 months.   Coordinator will call with all pending results.     Please call transplant coordinator with any questions:  Marianne Vogt, MSN, RN, PHN  Heart Transplant Coordinator   Office: [phone]

## 2024-06-24 NOTE — NURSING NOTE
Chief Complaint   Patient presents with    Follow Up     RETURN HEART TRANSPLANT     Vitals were taken and medications reconciled.    Brown Kerr, EMT  10:03 AM

## 2024-07-11 SDOH — HEALTH STABILITY: PHYSICAL HEALTH: ON AVERAGE, HOW MANY MINUTES DO YOU ENGAGE IN EXERCISE AT THIS LEVEL?: 40 MIN

## 2024-07-11 SDOH — HEALTH STABILITY: PHYSICAL HEALTH: ON AVERAGE, HOW MANY DAYS PER WEEK DO YOU ENGAGE IN MODERATE TO STRENUOUS EXERCISE (LIKE A BRISK WALK)?: 3 DAYS

## 2024-07-12 DIAGNOSIS — Z79.899 ENCOUNTER FOR LONG-TERM (CURRENT) USE OF MEDICATIONS: ICD-10-CM

## 2024-07-12 DIAGNOSIS — Z94.1 HEART REPLACED BY TRANSPLANT (H): Primary | ICD-10-CM

## 2024-07-12 RX ORDER — POTASSIUM CHLORIDE 1500 MG/1
40 TABLET, EXTENDED RELEASE ORAL
Status: CANCELLED | OUTPATIENT
Start: 2024-07-12

## 2024-07-12 RX ORDER — ASPIRIN 81 MG/1
243 TABLET, CHEWABLE ORAL ONCE
Status: CANCELLED | OUTPATIENT
Start: 2024-07-12

## 2024-07-12 RX ORDER — POTASSIUM CHLORIDE 1500 MG/1
20 TABLET, EXTENDED RELEASE ORAL
Status: CANCELLED | OUTPATIENT
Start: 2024-07-12

## 2024-07-12 RX ORDER — ASPIRIN 325 MG
325 TABLET ORAL ONCE
Status: CANCELLED | OUTPATIENT
Start: 2024-07-12 | End: 2024-07-12

## 2024-07-12 RX ORDER — SODIUM CHLORIDE 9 MG/ML
INJECTION, SOLUTION INTRAVENOUS CONTINUOUS
Status: CANCELLED | OUTPATIENT
Start: 2024-07-12

## 2024-07-14 NOTE — PROGRESS NOTES
Patient called . Request DAVID be faxed to him at [phone] as he is unable to print it.    DAVID faxed.    Kat Daily [facility]  Social Work Care Coordinator  [facility]  Ph: [phone]     No

## 2024-07-15 ENCOUNTER — HOSPITAL ENCOUNTER (OUTPATIENT)
Facility: CLINIC | Age: 69
Discharge: HOME | End: 2024-07-15
Attending: INTERNAL MEDICINE | Admitting: INTERNAL MEDICINE
Payer: MEDICARE

## 2024-07-15 ENCOUNTER — APPOINTMENT (OUTPATIENT)
Dept: MEDSURG UNIT | Facility: CLINIC | Age: 69
End: 2024-07-15
Attending: INTERNAL MEDICINE
Payer: MEDICARE

## 2024-07-15 ENCOUNTER — LAB (OUTPATIENT)
Dept: LAB | Facility: CLINIC | Age: 69
End: 2024-07-15
Attending: INTERNAL MEDICINE
Payer: MEDICARE

## 2024-07-15 VITALS
TEMPERATURE: 97.8 F | RESPIRATION RATE: 21 BRPM | WEIGHT: 172.4 LBS | BODY MASS INDEX: 26.13 KG/M2 | OXYGEN SATURATION: 100 % | HEIGHT: 68 IN | HEART RATE: 82 BPM | SYSTOLIC BLOOD PRESSURE: 137 MMHG | DIASTOLIC BLOOD PRESSURE: 96 MMHG

## 2024-07-15 DIAGNOSIS — N18.2 CKD (CHRONIC KIDNEY DISEASE) STAGE 2, GFR 60-89 ML/MIN: Primary | ICD-10-CM

## 2024-07-15 DIAGNOSIS — Z98.890 S/P CORONARY ANGIOGRAM: ICD-10-CM

## 2024-07-15 DIAGNOSIS — Z79.899 ENCOUNTER FOR LONG-TERM (CURRENT) USE OF MEDICATIONS: ICD-10-CM

## 2024-07-15 DIAGNOSIS — Z94.1 HEART REPLACED BY TRANSPLANT (H): ICD-10-CM

## 2024-07-15 LAB
ANION GAP SERPL CALCULATED.3IONS-SCNC: 13 MMOL/L (ref 7–15)
BUN SERPL-MCNC: 21.3 MG/DL (ref 8–23)
CALCIUM SERPL-MCNC: 9.7 MG/DL (ref 8.8–10.2)
CHLORIDE SERPL-SCNC: 104 MMOL/L (ref 98–107)
CREAT SERPL-MCNC: 1.24 MG/DL (ref 0.67–1.17)
DEPRECATED HCO3 PLAS-SCNC: 21 MMOL/L (ref 22–29)
EGFRCR SERPLBLD CKD-EPI 2021: 63 ML/MIN/1.73M2
ERYTHROCYTE [DISTWIDTH] IN BLOOD BY AUTOMATED COUNT: 12.8 % (ref 10–15)
GLUCOSE SERPL-MCNC: 131 MG/DL (ref 70–99)
HCT VFR BLD AUTO: 39.5 % (ref 40–53)
HGB BLD-MCNC: 13.3 G/DL (ref 13.3–17.7)
HOLD SPECIMEN: NORMAL
INR PPP: 1 (ref 0.85–1.15)
MAGNESIUM SERPL-MCNC: 1.7 MG/DL (ref 1.7–2.3)
MCH RBC QN AUTO: 30.1 PG (ref 26.5–33)
MCHC RBC AUTO-ENTMCNC: 33.7 G/DL (ref 31.5–36.5)
MCV RBC AUTO: 89 FL (ref 78–100)
PHOSPHATE SERPL-MCNC: 3.4 MG/DL (ref 2.5–4.5)
PLATELET # BLD AUTO: 259 10E3/UL (ref 150–450)
POTASSIUM SERPL-SCNC: 3.8 MMOL/L (ref 3.4–5.3)
RBC # BLD AUTO: 4.42 10E6/UL (ref 4.4–5.9)
SODIUM SERPL-SCNC: 138 MMOL/L (ref 135–145)
WBC # BLD AUTO: 4.4 10E3/UL (ref 4–11)

## 2024-07-15 PROCEDURE — C1726 CATH, BAL DIL, NON-VASCULAR: HCPCS | Performed by: INTERNAL MEDICINE

## 2024-07-15 PROCEDURE — 99152 MOD SED SAME PHYS/QHP 5/>YRS: CPT | Performed by: INTERNAL MEDICINE

## 2024-07-15 PROCEDURE — 250N000011 HC RX IP 250 OP 636: Performed by: INTERNAL MEDICINE

## 2024-07-15 PROCEDURE — 999N000054 HC STATISTIC EKG NON-CHARGEABLE

## 2024-07-15 PROCEDURE — 258N000003 HC RX IP 258 OP 636: Performed by: INTERNAL MEDICINE

## 2024-07-15 PROCEDURE — 93456 R HRT CORONARY ARTERY ANGIO: CPT | Mod: 26 | Performed by: INTERNAL MEDICINE

## 2024-07-15 PROCEDURE — 250N000009 HC RX 250: Performed by: INTERNAL MEDICINE

## 2024-07-15 PROCEDURE — 80048 BASIC METABOLIC PNL TOTAL CA: CPT | Performed by: INTERNAL MEDICINE

## 2024-07-15 PROCEDURE — 84100 ASSAY OF PHOSPHORUS: CPT | Performed by: INTERNAL MEDICINE

## 2024-07-15 PROCEDURE — 99153 MOD SED SAME PHYS/QHP EA: CPT | Performed by: INTERNAL MEDICINE

## 2024-07-15 PROCEDURE — 999N000134 HC STATISTIC PP CARE STAGE 3

## 2024-07-15 PROCEDURE — 93456 R HRT CORONARY ARTERY ANGIO: CPT | Performed by: INTERNAL MEDICINE

## 2024-07-15 PROCEDURE — 999N000127 HC STATISTIC PERIPHERAL IV START W US GUIDANCE

## 2024-07-15 PROCEDURE — 36415 COLL VENOUS BLD VENIPUNCTURE: CPT | Performed by: INTERNAL MEDICINE

## 2024-07-15 PROCEDURE — 99152 MOD SED SAME PHYS/QHP 5/>YRS: CPT | Mod: GC | Performed by: INTERNAL MEDICINE

## 2024-07-15 PROCEDURE — 272N000001 HC OR GENERAL SUPPLY STERILE: Performed by: INTERNAL MEDICINE

## 2024-07-15 PROCEDURE — C1751 CATH, INF, PER/CENT/MIDLINE: HCPCS | Performed by: INTERNAL MEDICINE

## 2024-07-15 PROCEDURE — 85014 HEMATOCRIT: CPT | Performed by: INTERNAL MEDICINE

## 2024-07-15 PROCEDURE — 93005 ELECTROCARDIOGRAM TRACING: CPT

## 2024-07-15 PROCEDURE — 999N000142 HC STATISTIC PROCEDURE PREP ONLY

## 2024-07-15 PROCEDURE — 85610 PROTHROMBIN TIME: CPT | Performed by: STUDENT IN AN ORGANIZED HEALTH CARE EDUCATION/TRAINING PROGRAM

## 2024-07-15 PROCEDURE — 83735 ASSAY OF MAGNESIUM: CPT | Performed by: INTERNAL MEDICINE

## 2024-07-15 PROCEDURE — C1894 INTRO/SHEATH, NON-LASER: HCPCS | Performed by: INTERNAL MEDICINE

## 2024-07-15 PROCEDURE — 250N000013 HC RX MED GY IP 250 OP 250 PS 637: Performed by: INTERNAL MEDICINE

## 2024-07-15 RX ORDER — OXYCODONE HYDROCHLORIDE 5 MG/1
10 TABLET ORAL EVERY 4 HOURS PRN
Status: DISCONTINUED | OUTPATIENT
Start: 2024-07-15 | End: 2024-07-15 | Stop reason: HOSPADM

## 2024-07-15 RX ORDER — FENTANYL CITRATE 50 UG/ML
INJECTION, SOLUTION INTRAMUSCULAR; INTRAVENOUS
Status: DISCONTINUED | OUTPATIENT
Start: 2024-07-15 | End: 2024-07-15 | Stop reason: HOSPADM

## 2024-07-15 RX ORDER — NALOXONE HYDROCHLORIDE 0.4 MG/ML
0.2 INJECTION, SOLUTION INTRAMUSCULAR; INTRAVENOUS; SUBCUTANEOUS
Status: DISCONTINUED | OUTPATIENT
Start: 2024-07-15 | End: 2024-07-15 | Stop reason: HOSPADM

## 2024-07-15 RX ORDER — NALOXONE HYDROCHLORIDE 0.4 MG/ML
0.4 INJECTION, SOLUTION INTRAMUSCULAR; INTRAVENOUS; SUBCUTANEOUS
Status: DISCONTINUED | OUTPATIENT
Start: 2024-07-15 | End: 2024-07-15 | Stop reason: HOSPADM

## 2024-07-15 RX ORDER — IOPAMIDOL 755 MG/ML
INJECTION, SOLUTION INTRAVASCULAR
Status: DISCONTINUED | OUTPATIENT
Start: 2024-07-15 | End: 2024-07-15 | Stop reason: HOSPADM

## 2024-07-15 RX ORDER — ASPIRIN 325 MG
325 TABLET ORAL ONCE
Status: COMPLETED | OUTPATIENT
Start: 2024-07-15 | End: 2024-07-15

## 2024-07-15 RX ORDER — FLUMAZENIL 0.1 MG/ML
0.2 INJECTION, SOLUTION INTRAVENOUS
Status: DISCONTINUED | OUTPATIENT
Start: 2024-07-15 | End: 2024-07-15 | Stop reason: HOSPADM

## 2024-07-15 RX ORDER — POTASSIUM CHLORIDE 750 MG/1
40 TABLET, EXTENDED RELEASE ORAL
Status: DISCONTINUED | OUTPATIENT
Start: 2024-07-15 | End: 2024-07-15 | Stop reason: HOSPADM

## 2024-07-15 RX ORDER — ATROPINE SULFATE 0.1 MG/ML
0.5 INJECTION INTRAVENOUS
Status: DISCONTINUED | OUTPATIENT
Start: 2024-07-15 | End: 2024-07-15 | Stop reason: HOSPADM

## 2024-07-15 RX ORDER — OXYCODONE HYDROCHLORIDE 5 MG/1
5 TABLET ORAL EVERY 4 HOURS PRN
Status: DISCONTINUED | OUTPATIENT
Start: 2024-07-15 | End: 2024-07-15 | Stop reason: HOSPADM

## 2024-07-15 RX ORDER — LIDOCAINE 40 MG/G
CREAM TOPICAL
Status: DISCONTINUED | OUTPATIENT
Start: 2024-07-15 | End: 2024-07-15 | Stop reason: HOSPADM

## 2024-07-15 RX ORDER — FENTANYL CITRATE 50 UG/ML
25 INJECTION, SOLUTION INTRAMUSCULAR; INTRAVENOUS
Status: DISCONTINUED | OUTPATIENT
Start: 2024-07-15 | End: 2024-07-15 | Stop reason: HOSPADM

## 2024-07-15 RX ORDER — ASPIRIN 81 MG/1
243 TABLET, CHEWABLE ORAL ONCE
Status: COMPLETED | OUTPATIENT
Start: 2024-07-15 | End: 2024-07-15

## 2024-07-15 RX ORDER — SODIUM CHLORIDE 9 MG/ML
INJECTION, SOLUTION INTRAVENOUS CONTINUOUS
Status: DISCONTINUED | OUTPATIENT
Start: 2024-07-15 | End: 2024-07-15 | Stop reason: HOSPADM

## 2024-07-15 RX ORDER — POTASSIUM CHLORIDE 750 MG/1
20 TABLET, EXTENDED RELEASE ORAL
Status: DISCONTINUED | OUTPATIENT
Start: 2024-07-15 | End: 2024-07-15 | Stop reason: HOSPADM

## 2024-07-15 RX ADMIN — SODIUM CHLORIDE: 9 INJECTION, SOLUTION INTRAVENOUS at 08:20

## 2024-07-15 RX ADMIN — ASPIRIN 325 MG ORAL TABLET 325 MG: 325 PILL ORAL at 07:49

## 2024-07-15 ASSESSMENT — ACTIVITIES OF DAILY LIVING (ADL)
ADLS_ACUITY_SCORE: 38

## 2024-07-15 NOTE — PROGRESS NOTES
Patient tolerated recovery stage well. VSS, right groin site clean/dry/intact and patient denies pain. Patient tolerated PO food and fluids. Teaching was done and discharge instructions were given. Patient ambulated, voided, and PIV was removed. Patient discharged from the hospital to home with friend.

## 2024-07-15 NOTE — PROGRESS NOTES
D/I/A: Manual pressure held for 20 minutes to right groin.  Sheaths, size 4 Fr and 7 Fr,  pulled by TONY Ware.  Site CDI, no hematoma.  Stasis achieved at 1055. Off bedrest @ 1255.  A+O x4 and making needs known.  Denies pain or sob.  VSSA.  Tolerated sheath removal well.  P: Continue to monitor status.  Discharge to home once meeting criteria.

## 2024-07-15 NOTE — DISCHARGE INSTRUCTIONS
Going Home After a Coronary Angiogram   ______________________________________________      After you go home:  Have an adult stay with you for 24 hours.  Drink plenty of fluids.  You may eat your normal diet, unless your doctor tells you otherwise.  For 24 hours:  Relax and take it easy.  Do NOT smoke.  Do NOT make any important or legal decisions.  Do NOT drive or operate machines at home or at work.  Do NOT drink alcohol.  Remove the Band-Aid after 24 hours. If there is minor oozing, apply another Band-aid and remove it after 12 hours.  For 2 days, do NOT have sex or do any heavy exercise.  Do NOT take a bath, or use a hot tub or pool for at least 3 days. You may shower.    Care of groin site  It is normal to have a small bruise or lump at the site.  Do not scrub the site.  For the first 2 days: Do not stoop or squat. When you cough, sneeze or move your bowels, hold your hand over the puncture site and press gently.  Do not lift more than 10 pounds for at least 3 to 5 days.  Do not use lotion or powder near the puncture site for 3 days.    If you start bleeding from the site in your groin, lie down flat and press firmly  on the site. Call your doctor as soon as you can.        Medicines  If you have started taking Plavix or Effient, do not stop taking it until you talk to your heart doctor (cardiologist).  If you are on metformin (Glucophage), do not restart it until you have blood tests (within 2 to 3 days after discharge). When your doctor tells you it is safe, you may restart the metformin.  If you have stopped any other medicines, check with your nurse or provider about when to restart them.    Call 911 right away if you have bleeding that is heavy or does not stop.    Call your doctor if:  You have a large or growing hard lump around the site.  The site is red, swollen, hot or tender.  Blood or fluid is draining from the site.  You have chills or a fever greater than 101 F (38 C).  Your leg or arm feels  numb or cool.  You have hives, a rash or unusual itching.      [facility] [place]:  [phone] (7 days a week)

## 2024-07-15 NOTE — PROGRESS NOTES
D/I/A: Manual pressure held for 20 minutes to right groin.  Sheath, size 4FR to RFA and 7Fr to RFV,  pulled by Jonny RN.  Site CDI, no hematoma.  Stasis achieved at 1055. Off bedrest @ 1300.  A+O x4 and making needs known.  Denies pain or sob.  VSSA.  Tolerated sheath removal well.  P: Continue to monitor status.  Discharge to home once meeting criteria.

## 2024-07-15 NOTE — PROGRESS NOTES
D/I/A: Pt roomed on 2a in room 12.  Arrived via litter and accompanied by CCL RN On/Off: On monitor.  VSSA.  Rhythm upon arrival SR on monitor.  Denies pain or sob.  Reviewed activity restrictions and when to notify RN, ie-changes to breathing or increased chest pressure or chest pain.  CCL access: 4Fr to RFA and 7Fr to RFV. Two hour bedrest once sheaths are pulled. Sites C/D/I, negative for a hematoma.   P: Continue to monitor status.  Discharge to home once meeting criteria.

## 2024-07-15 NOTE — PROGRESS NOTES
Patient arrives to 2A in room 12. Patient arrives alone, will be transported home by friend, Naresh. Patient's VSS, AOx4. Prep completed and consent signed. Will continue to assess patient until taken for procedure.

## 2024-07-16 ENCOUNTER — OFFICE VISIT (OUTPATIENT)
Dept: FAMILY MEDICINE | Facility: CLINIC | Age: 69
End: 2024-07-16
Payer: MEDICARE

## 2024-07-16 VITALS
WEIGHT: 171.2 LBS | DIASTOLIC BLOOD PRESSURE: 76 MMHG | TEMPERATURE: 97.3 F | RESPIRATION RATE: 16 BRPM | OXYGEN SATURATION: 100 % | BODY MASS INDEX: 26.03 KG/M2 | HEART RATE: 90 BPM | SYSTOLIC BLOOD PRESSURE: 119 MMHG

## 2024-07-16 DIAGNOSIS — L65.9 HAIR LOSS: ICD-10-CM

## 2024-07-16 DIAGNOSIS — N25.81 SECONDARY RENAL HYPERPARATHYROIDISM (H): ICD-10-CM

## 2024-07-16 DIAGNOSIS — Z00.00 ENCOUNTER FOR MEDICARE ANNUAL WELLNESS EXAM: Primary | ICD-10-CM

## 2024-07-16 DIAGNOSIS — N18.2 CKD (CHRONIC KIDNEY DISEASE) STAGE 2, GFR 60-89 ML/MIN: ICD-10-CM

## 2024-07-16 DIAGNOSIS — R97.20 ELEVATED PROSTATE SPECIFIC ANTIGEN (PSA): ICD-10-CM

## 2024-07-16 LAB
ALBUMIN UR-MCNC: NEGATIVE MG/DL
APPEARANCE UR: CLEAR
ATRIAL RATE - MUSE: 78 BPM
BILIRUB UR QL STRIP: NEGATIVE
COLOR UR AUTO: YELLOW
CREAT UR-MCNC: 92.4 MG/DL
DIASTOLIC BLOOD PRESSURE - MUSE: NORMAL MMHG
GLUCOSE UR STRIP-MCNC: >=1000 MG/DL
HGB UR QL STRIP: NEGATIVE
INTERPRETATION ECG - MUSE: NORMAL
KETONES UR STRIP-MCNC: NEGATIVE MG/DL
LEUKOCYTE ESTERASE UR QL STRIP: NEGATIVE
MICROALBUMIN UR-MCNC: <12 MG/L
MICROALBUMIN/CREAT UR: NORMAL MG/G{CREAT}
NITRATE UR QL: NEGATIVE
P AXIS - MUSE: 38 DEGREES
PH UR STRIP: 6 [PH] (ref 5–7)
PR INTERVAL - MUSE: 210 MS
QRS DURATION - MUSE: 88 MS
QT - MUSE: 364 MS
QTC - MUSE: 414 MS
R AXIS - MUSE: 49 DEGREES
SP GR UR STRIP: 1.01 (ref 1–1.03)
SYSTOLIC BLOOD PRESSURE - MUSE: NORMAL MMHG
T AXIS - MUSE: 77 DEGREES
UROBILINOGEN UR STRIP-ACNC: 0.2 E.U./DL
VENTRICULAR RATE- MUSE: 78 BPM

## 2024-07-16 PROCEDURE — 82043 UR ALBUMIN QUANTITATIVE: CPT | Performed by: NURSE PRACTITIONER

## 2024-07-16 PROCEDURE — 81003 URINALYSIS AUTO W/O SCOPE: CPT | Performed by: NURSE PRACTITIONER

## 2024-07-16 PROCEDURE — G0439 PPPS, SUBSEQ VISIT: HCPCS | Performed by: NURSE PRACTITIONER

## 2024-07-16 PROCEDURE — 99213 OFFICE O/P EST LOW 20 MIN: CPT | Mod: 25 | Performed by: NURSE PRACTITIONER

## 2024-07-16 PROCEDURE — 82570 ASSAY OF URINE CREATININE: CPT | Performed by: NURSE PRACTITIONER

## 2024-07-16 ASSESSMENT — PAIN SCALES - GENERAL: PAINLEVEL: NO PAIN (0)

## 2024-07-16 NOTE — PROGRESS NOTES
"Preventive Care Visit  [facility]  Alicia Shearer DNP, Nurse Practitioner Primary Care  Jul 16, 2024      Assessment & Plan     Encounter for Medicare annual wellness exam  Reviewed medical/social/family history and health maintenance   Recommend COVID booster in a couple months, last was in April.     Elevated prostate specific antigen (PSA)  Noted on last labs.  Will rule out infection with UA.  Agree with plan to recheck in 6 months.  I continues to be elevated, will refer to urology  - UA Macroscopic with reflex to Microscopic and Culture - Lab Collect; Future  - UA Macroscopic with reflex to Microscopic and Culture - Lab Collect    CKD (chronic kidney disease) stage 2, GFR 60-89 ml/min  S/p transplant, follows with neprhology.  Labs are stable.    - Albumin Random Urine Quantitative with Creat Ratio; Future  - Albumin Random Urine Quantitative with Creat Ratio    Secondary renal hyperparathyroidism (H24)  Follows with endocrinology. 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  Will be seeing dermatology.  Pending repeat PSA, could consider trial of finasteride as I suspect he also has some BPH     Patient has been advised of split billing requirements and indicates understanding: Yes        BMI  Estimated body mass index is 26.03 kg/m  as calculated from the following:    Height as of 7/15/24: 1.727 m (5' 8\").    Weight as of this encounter: 77.7 kg (171 lb 3.2 oz).       Counseling  Appropriate preventive services were discussed with this patient, including applicable screening as appropriate for fall prevention, nutrition, physical activity, Tobacco-use cessation, weight loss and cognition.  Checklist reviewing preventive services available has been given to the patient.  Reviewed patient's diet, addressing concerns and/or questions.   He is at risk for lack of exercise and has been provided with information to increase physical activity for the benefit of his " well-being.   The patient was provided with written information regarding signs of hearing loss.           Dino Gao is a 69 year old, presenting for the following:  Medicare Visit (Diabetic foot exam./Check PSA./Might want to discuss about B12./Had an angiogram yesterday./Doctor's note for work.)        7/16/2024     8:31 AM   Additional Questions   Roomed by Margaret RESENDIZ         7/16/2024   Forms   Any forms needing to be completed Yes       Health Care Directive  Patient has a Health Care Directive on file  Advance care planning document is on file and is current.    HPI        7/11/2024   General Health   How would you rate your overall physical health? Excellent   Feel stress (tense, anxious, or unable to sleep) Not at all            7/11/2024   Nutrition   Diet: Regular (no restrictions)            7/11/2024   Exercise   Days per week of moderate/strenous exercise 3 days   Average minutes spent exercising at this level 40 min            7/11/2024   Social Factors   Worry food won't last until get money to buy more No   Food not last or not have enough money for food? No   Do you have housing? (Housing is defined as stable permanent housing and does not include staying ouside in a car, in a tent, in an abandoned building, in an overnight shelter, or couch-surfing.) Yes   Are you worried about losing your housing? No   Lack of transportation? No   Unable to get utilities (heat,electricity)? No            7/11/2024   Fall Risk   Fallen 2 or more times in the past year? No    No   Trouble with walking or balance? No    No       Multiple values from one day are sorted in reverse-chronological order          7/11/2024   Activities of Daily Living- Home Safety   Needs help with the following daily activites None of the above   Safety concerns in the home None of the above            7/11/2024   Dental   Dentist two times every year? Yes            7/11/2024   Hearing Screening   Hearing concerns? (!) I FEEL THAT  PEOPLE ARE MUMBLING OR NOT SPEAKING CLEARLY.    (!) I NEED TO ASK PEOPLE TO SPEAK UP OR REPEAT THEMSELVES.    (!) IT'S HARD TO FOLLOW A CONVERSATION IN A NOISY RESTAURANT OR CROWDED ROOM.    (!) TROUBLE FOLLOWING DIALOGUE IN THE THEATHER.    (!) TROUBLE UNDERSTANDING SOFT OR WHISPERED SPEECH.       Multiple values from one day are sorted in reverse-chronological order         2024   Driving Risk Screening   Patient/family members have concerns about driving No            2024   General Alertness/Fatigue Screening   Have you been more tired than usual lately? No            2024   Urinary Incontinence Screening   Bothered by leaking urine in past 6 months No               Today's PHQ-2 Score:       7/15/2024     3:36 PM   PHQ-2 (  [employer])   Q1: Little interest or pleasure in doing things 0   Q2: Feeling down, depressed or hopeless 0   PHQ-2 Score 0   Q1: Little interest or pleasure in doing things Not at all   Q2: Feeling down, depressed or hopeless Not at all   PHQ-2 Score 0           2024   Substance Use   Alcohol more than 3/day or more than 7/wk No   Do you have a current opioid prescription? No   How severe/bad is pain from 1 to 10? 0/10 (No Pain)   Do you use any other substances recreationally? (!) CANNABIS PRODUCTS        Social History     Tobacco Use    Smoking status: Former     Current packs/day: 0.00     Average packs/day: 1 pack/day for 20.0 years (20.0 ttl pk-yrs)     Types: Cigarettes     Start date: 1984     Quit date: 1994     Years since quittin.9    Smokeless tobacco: Never   Vaping Use    Vaping status: Never Used   Substance Use Topics    Alcohol use: Yes     Comment: Occasionally    Drug use: No       Last PSA:   PSA   Date Value Ref Range Status   2021 2.50 0 - 4 ug/L Final     Comment:     Assay Method:  Chemiluminescence using Siemens Vista analyzer     Prostate Specific Antigen Screen   Date Value Ref Range Status   2024 4.76 (H) 0.00 - 4.50  ng/mL Final   06/13/2022 2.67 0.00 - 4.00 ug/L Final     ASCVD Risk   The ASCVD Risk score (John DK, et al., 2019) failed to calculate for the following reasons:    The patient has a prior MI or stroke diagnosis            Reviewed and updated as needed this visit by Provider                      Current providers sharing in care for this patient include:  Patient Care Team:  Alicia Shearer DNP as PCP - General (Nurse Practitioner Primary Care)  Arcelia Blum MD as MD (Cardiology)  Kristi Armas PA as Physician Assistant (Physician Assistant)  Jaky Canela as Clinic Care Coordinator  [facility] Lu[facility] jasmine [facility] as MD (Nephrology)  [facility] ([facility] ([facility]  Abram Moulton MD as MD (Family Practice)  Eduardo Lea [facility] as Pharmacist (Pharmacist)  Mariangel Merida MD as MD (INTERNAL MEDICINE - ENDOCRINOLOGY, DIABETES & METABOLISM)  Noel Godwin MD as Referring Physician (Otolaryngology)  Dequan De La Paz DO as MD (Neurology)  Shaheed Curtis MD as MD (Surgery)  Giovany Quijano MD as Assigned Nephrology Provider  Giovany Quijano MD as MD (Nephrology)  Klever Ernst MD as Assigned Heart and Vascular Provider  Rosa Calvert PA-C as Assigned Endocrinology Provider  Barby Arciniega AuD as Audiologist (Audiology)  Alicia Shearer DNP as Assigned PCP  Alejandro Murguia DO as Assigned Pulmonology Provider  Jessy Herbert MD as Assigned Cancer Care Provider  Cleo Marks NP as Nurse Practitioner (Cardiovascular Disease)  Talia Hernandez PA-C as Physician Assistant (Dermatology)  Mellisa Suh APRN CNP as Nurse Practitioner (Cardiovascular Disease)  Marianne Vogt RN as Transplant Coordinator (Cardiology)    The following health maintenance items are reviewed in Epic and correct as of today:  Health Maintenance   Topic Date Due    MEDICARE ANNUAL WELLNESS VISIT   "03/06/2024    DIABETIC FOOT EXAM  03/06/2024    ANNUAL REVIEW OF HM ORDERS  03/06/2024    COVID-19 Vaccine (9 - 2023-24 season) 06/05/2024    MICROALBUMIN  07/20/2024    INFLUENZA VACCINE (1) 09/01/2024    EYE EXAM  09/21/2024    A1C  12/12/2024    BMP  01/15/2025    LIPID  06/12/2025    HEMOGLOBIN  07/15/2025    FALL RISK ASSESSMENT  07/16/2025    ADVANCE CARE PLANNING  03/06/2028    COLORECTAL CANCER SCREENING  05/26/2028    DTAP/TDAP/TD IMMUNIZATION (3 - Td or Tdap) 07/20/2033    HEPATITIS C SCREENING  Completed    PHQ-2 (once per calendar year)  Completed    Pneumococcal Vaccine: 65+ Years  Completed    URINALYSIS  Completed    ZOSTER IMMUNIZATION  Completed    RSV VACCINE (Pregnancy & 60+)  Completed    AORTIC ANEURYSM SCREENING (SYSTEM ASSIGNED)  Completed    IPV IMMUNIZATION  Aged Out    HPV IMMUNIZATION  Aged Out    MENINGITIS IMMUNIZATION  Aged Out    RSV MONOCLONAL ANTIBODY  Aged Out            Objective    Exam  /76   Pulse 90   Temp 97.3  F (36.3  C) (Temporal)   Resp 16   Wt 77.7 kg (171 lb 3.2 oz)   SpO2 100%   BMI 26.03 kg/m     Estimated body mass index is 26.03 kg/m  as calculated from the following:    Height as of 7/15/24: 1.727 m (5' 8\").    Weight as of this encounter: 77.7 kg (171 lb 3.2 oz).    Physical Exam  GENERAL: alert and no distress  EYES: Eyes grossly normal to inspection, PERRL and conjunctivae and sclerae normal  HENT: ear canals and TM's normal, nose and mouth without ulcers or lesions  NECK: no adenopathy, no asymmetry, masses, or scars  RESP: lungs clear to auscultation - no rales, rhonchi or wheezes  CV: regular rate and rhythm, normal S1 S2, no S3 or S4, no murmur, click or rub, no peripheral edema  ABDOMEN: soft, nontender, no hepatosplenomegaly, no masses and bowel sounds normal  MS: no gross musculoskeletal defects noted, no edema  SKIN: no suspicious lesions or rashes  NEURO: Normal strength and tone, mentation intact and speech normal  PSYCH: mentation appears " normal, affect normal/bright  Diabetic foot exam: normal DP and PT pulses, no trophic changes or ulcerative lesions, and normal sensory exam        7/16/2024   Mini Cog   Clock Draw Score 2 Normal   3 Item Recall 2 objects recalled   Mini Cog Total Score 4                 Signed Electronically by: Alicia Shearer, DNP

## 2024-07-16 NOTE — PATIENT INSTRUCTIONS
Patient Education   Preventive Care Advice   This is general advice given by our system to help you stay healthy. However, your care team may have specific advice just for you. Please talk to your care team about your preventive care needs.  Nutrition  Eat 5 or more servings of fruits and vegetables each day.  Try wheat bread, brown rice and whole grain pasta (instead of white bread, rice, and pasta).  Get enough calcium and vitamin D. Check the label on foods and aim for 100% of the RDA (recommended daily allowance).  Lifestyle  Exercise at least 150 minutes each week  (30 minutes a day, 5 days a week).  Do muscle strengthening activities 2 days a week. These help control your weight and prevent disease.  No smoking.  Wear sunscreen to prevent skin cancer.  Have a dental exam and cleaning every 6 months.  Yearly exams  See your health care team every year to talk about:  Any changes in your health.  Any medicines your care team has prescribed.  Preventive care, family planning, and ways to prevent chronic diseases.  Shots (vaccines)   HPV shots (up to age 26), if you've never had them before.  Hepatitis B shots (up to age 59), if you've never had them before.  COVID-19 shot: Get this shot when it's due.  Flu shot: Get a flu shot every year.  Tetanus shot: Get a tetanus shot every 10 years.  Pneumococcal, hepatitis A, and RSV shots: Ask your care team if you need these based on your risk.  Shingles shot (for age 50 and up)  General health tests  Diabetes screening:  Starting at age 35, Get screened for diabetes at least every 3 years.  If you are younger than age 35, ask your care team if you should be screened for diabetes.  Cholesterol test: At age 39, start having a cholesterol test every 5 years, or more often if advised.  Bone density scan (DEXA): At age 50, ask your care team if you should have this scan for osteoporosis (brittle bones).  Hepatitis C: Get tested at least once in your life.  STIs (sexually  transmitted infections)  Before age 24: Ask your care team if you should be screened for STIs.  After age 24: Get screened for STIs if you're at risk. You are at risk for STIs (including HIV) if:  You are sexually active with more than one person.  You don't use condoms every time.  You or a partner was diagnosed with a sexually transmitted infection.  If you are at risk for HIV, ask about PrEP medicine to prevent HIV.  Get tested for HIV at least once in your life, whether you are at risk for HIV or not.  Cancer screening tests  Cervical cancer screening: If you have a cervix, begin getting regular cervical cancer screening tests starting at age 21.  Breast cancer scan (mammogram): If you've ever had breasts, begin having regular mammograms starting at age 40. This is a scan to check for breast cancer.  Colon cancer screening: It is important to start screening for colon cancer at age 45.  Have a colonoscopy test every 10 years (or more often if you're at risk) Or, ask your provider about stool tests like a FIT test every year or Cologuard test every 3 years.  To learn more about your testing options, visit:   .  For help making a decision, visit:   https://bit.ly/ai20998.  Prostate cancer screening test: If you have a prostate, ask your care team if a prostate cancer screening test (PSA) at age 55 is right for you.  Lung cancer screening: If you are a current or former smoker ages 50 to 80, ask your care team if ongoing lung cancer screenings are right for you.  For informational purposes only. Not to replace the advice of your health care provider. Copyright   2023 [facility] [employer]. All rights reserved. Clinically reviewed by the [facility] Transitions Program. [employer] 191324 - REV 01/24.  Hearing Loss: Care Instructions  Overview     Hearing loss is a sudden or slow decrease in how well you hear. It can range from slight to profound. Permanent hearing loss can occur with aging. It also can  happen when you are exposed long-term to loud noise. Examples include listening to loud music, riding motorcycles, or being around other loud machines.  Hearing loss can affect your work and home life. It can make you feel lonely or depressed. You may feel that you have lost your independence. But hearing aids and other devices can help you hear better and feel connected to others.  Follow-up care is a key part of your treatment and safety. Be sure to make and go to all appointments, and call your doctor if you are having problems. It's also a good idea to know your test results and keep a list of the medicines you take.  How can you care for yourself at home?  Avoid loud noises whenever possible. This helps keep your hearing from getting worse.  Always wear hearing protection around loud noises.  Wear a hearing aid as directed.  A professional can help you pick a hearing aid that will work best for you.  You can also get hearing aids over the counter for mild to moderate hearing loss.  Have hearing tests as your doctor suggests. They can show whether your hearing has changed. Your hearing aid may need to be adjusted.  Use other devices as needed. These may include:  Telephone amplifiers and hearing aids that can connect to a television, stereo, radio, or microphone.  Devices that use lights or vibrations. These alert you to the doorbell, a ringing telephone, or a baby monitor.  Television closed-captioning. This shows the words at the bottom of the screen. Most new TVs can do this.  TTY (text telephone). This lets you type messages back and forth on the telephone instead of talking or listening. These devices are also called TDD. When messages are typed on the keyboard, they are sent over the phone line to a receiving TTY. The message is shown on a monitor.  Use text messaging, social media, and email if it is hard for you to communicate by telephone.  Try to learn a listening technique called speechreading. It is  "not lipreading. You pay attention to people's gestures, expressions, posture, and tone of voice. These clues can help you understand what a person is saying. Face the person you are talking to, and have them face you. Make sure the lighting is good. You need to see the other person's face clearly.  Think about counseling if you need help to adjust to your hearing loss.  When should you call for help?  Watch closely for changes in your health, and be sure to contact your doctor if:    You think your hearing is getting worse.     You have new symptoms, such as dizziness or nausea.   Where can you learn more?  Go to https://www.[employer].net/patiented  Enter R798 in the search box to learn more about \"Hearing Loss: Care Instructions.\"  Current as of: September 27, 2023               Content Version: 14.0    4956-4830 [employer].   Care instructions adapted under license by your healthcare professional. If you have questions about a medical condition or this instruction, always ask your healthcare professional. [employer] disclaims any warranty or liability for your use of this information.      Substance Use Disorder: Care Instructions  Overview     You can improve your life and health by stopping your use of alcohol or drugs. When you don't drink or use drugs, you may feel and sleep better. You may get along better with your family, friends, and coworkers. There are medicines and programs that can help with substance use disorder.  How can you care for yourself at home?  Here are some ways to help you stay sober and prevent relapse.  If you have been given medicine to help keep you sober or reduce your cravings, be sure to take it exactly as prescribed.  Talk to your doctor about programs that can help you stop using drugs or drinking alcohol.  Do not keep alcohol or drugs in your home.  Plan ahead. Think about what you'll say if other people ask you to drink or use drugs. Try not to spend " time with people who drink or use drugs.  Use the time and money spent on drinking or drugs to do something that's important to you.  Preventing a relapse  Have a plan to deal with relapse. Learn to recognize changes in your thinking that lead you to drink or use drugs. Get help before you start to drink or use drugs again.  Try to stay away from situations, friends, or places that may lead you to drink or use drugs.  If you feel the need to drink alcohol or use drugs again, seek help right away. Call a trusted friend or family member. Some people get support from organizations such as Narcotics Anonymous or [employer] or from treatment facilities.  If you relapse, get help as soon as you can. Some people make a plan with another person that outlines what they want that person to do for them if they relapse. The plan usually includes how to handle the relapse and who to notify in case of relapse.  Don't give up. Remember that a relapse doesn't mean that you have failed. Use the experience to learn the triggers that lead you to drink or use drugs. Then quit again. Recovery is a lifelong process. Many people have several relapses before they are able to quit for good.  Follow-up care is a key part of your treatment and safety. Be sure to make and go to all appointments, and call your doctor if you are having problems. It's also a good idea to know your test results and keep a list of the medicines you take.  When should you call for help?   Call 911  anytime you think you may need emergency care. For example, call if you or someone else:    Has overdosed or has withdrawal signs. Be sure to tell the emergency workers that you are or someone else is using or trying to quit using drugs. Overdose or withdrawal signs may include:  Losing consciousness.  Seizure.  Seeing or hearing things that aren't there (hallucinations).     Is thinking or talking about suicide or harming others.   Where to get help 24 hours a day, 7  "days a week   If you or someone you know talks about suicide, self-harm, a mental health crisis, a substance use crisis, or any other kind of emotional distress, get help right away. You can:    Call the Suicide and Crisis Lifeline at 988.     Call 4-682-217-TALK (1-728.943.5607).     Text HOME to 619454 to access the Crisis Text Line.   Consider saving these numbers in your phone.  Go to [employer]Cab[employer] for more information or to chat online.  Call your doctor now or seek immediate medical care if:    You are having withdrawal symptoms. These may include nausea or vomiting, sweating, shakiness, and anxiety.   Watch closely for changes in your health, and be sure to contact your doctor if:    You have a relapse.     You need more help or support to stop.   Where can you learn more?  Go to https://www.[employer]springr.net/patiented  Enter H573 in the search box to learn more about \"Substance Use Disorder: Care Instructions.\"  Current as of: November 15, 2023               Content Version: 14.0    7999-0980 [employer].   Care instructions adapted under license by your healthcare professional. If you have questions about a medical condition or this instruction, always ask your healthcare professional. [employer] disclaims any warranty or liability for your use of this information.         "

## 2024-07-22 ENCOUNTER — OFFICE VISIT (OUTPATIENT)
Dept: DERMATOLOGY | Facility: CLINIC | Age: 69
End: 2024-07-22
Payer: MEDICARE

## 2024-07-22 DIAGNOSIS — D22.9 MULTIPLE NEVI: ICD-10-CM

## 2024-07-22 DIAGNOSIS — D23.9 DERMATOFIBROMA: ICD-10-CM

## 2024-07-22 DIAGNOSIS — L81.4 LENTIGINES: ICD-10-CM

## 2024-07-22 DIAGNOSIS — L82.1 SEBORRHEIC KERATOSES: ICD-10-CM

## 2024-07-22 DIAGNOSIS — L64.9 ANDROGENETIC ALOPECIA: ICD-10-CM

## 2024-07-22 DIAGNOSIS — Z12.83 ENCOUNTER FOR SCREENING FOR MALIGNANT NEOPLASM OF SKIN: Primary | ICD-10-CM

## 2024-07-22 PROCEDURE — 99213 OFFICE O/P EST LOW 20 MIN: CPT | Performed by: DERMATOLOGY

## 2024-07-22 ASSESSMENT — PAIN SCALES - GENERAL: PAINLEVEL: NO PAIN (0)

## 2024-07-22 NOTE — PROGRESS NOTES
[facility] Dermatology Note  Encounter Date: Jul 22, 2024  Office Visit     Reviewed patients past medical history and pertinent chart review prior to patients visit today.     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   - We discussed potential side effects including scalp irritation and flaking. We discussed potential temporary shedding 2-6 weeks into treatment. Application to the face and neck should be avoided. Wash hands after application.   - Should hair loss persist I recommend consultation in hair loss clinic.     # Chronic immunosuppression  # Multiple nevi, trunk and extremities  # Solar lentigines  - No concerning features on dermoscopy. We discussed the importance of self exams at home.   - ABCDEs: Counseled ABCDEs of melanoma: Asymmetry, Border (irregularity), Color (not uniform, changes in color), Diameter (greater than 6 mm which is about the size of a pencil eraser), and Evolving (any changes in preexisting moles).  - Sun protection: Counseled SPF 30+ sunscreen, UPF clothing, sun avoidance, tanning bed avoidance.    # Dermatofibromas  # Seborrheic keratoses  - We discussed the benign nature of the skin lesions. No treatment required. Continued observation recommended. Follow up with any concerns.      Follow-up:  Annual for follow up full body skin exam, as needed for new or changing lesions or new concerns    I attest that I recorded the interview and Dr Shaw personally performed the exam.   All risks, benefits and alternatives were discussed with patient.  Patient is in agreement and  understands the assessment and plan.  All questions were answered.  Talia Hernandez PA-C  [facility] Dermatology    ____________________________________________    CC: No chief complaint on file.    HPI:  Mr. Murray Nicholson is a(n) 69 year old male who presents today as a new patient for a full body skin cancer screening. The patient has a history of kidney and heart transplant, chronically immunosuppressed. The patient denies a personal history of skin cancer. The patient reports hair loss in recent years. This has been gradual over time with no symptoms of his scalp. He reports his mother also lost hair over time. He has not tried anything for the hair loss yet. No other specific cutaneous concerns today. The patient reports trying to be diligent with photoprotection.      Physical Exam:  Vitals: There were no vitals taken for this visit.  SKIN: Total skin excluding the genitalia areas was performed. The exam included the scalp, face, neck, bilateral arms, chest, back, abdomen, bilateral legs, digits, mons pubis, buttocks, and nails.   - Musa IV.  - Hair thinning involving the vertex and frontal scalp, no erythema, scale, or scarring, consistent with androgenetic alopecia.   - Multiple tan/brown macules and papules scattered throughout exam, consistent with benign nevi. No concerning features on dermoscopy.   - Scattered tan, homogenous macules scattered on sun exposed skin, consistent with solar lentigines.   - Scattered waxy, stuck on appearing papules and patches, consistent with seborrheic keratoses.    - Involving the right buttock and lower legs are brown macules with central hypopigmentation that dimple with lateral pressure, consistent with a dermatofibromas.     Medications:  Current Outpatient Medications   Medication Sig Dispense Refill    alcohol swab prep pads Use to swab area of injection/davida as directed. 300 each 6    amLODIPine (NORVASC) 5 MG tablet Take 1 tablet (5 mg) by mouth  daily 90 tablet 3    aspirin 81 MG EC tablet Take 81 mg by mouth every evening      atorvastatin (LIPITOR) 40 MG tablet Take 1 tablet (40 mg) by mouth every evening 30 tablet 11    Biotin 10 MG CAPS Take 10 mg by mouth daily      blood glucose (ACCU-CHEK GUIDE) test strip Use to test blood sugar 3 times daily or as directed. 300 strip 1    blood glucose (ACCU-CHEK SOFTCLIX) lancing device Lancing device to be used with lancets. Test blood sugar 3 times daily or as directed 1 each 0    blood glucose (NO BRAND SPECIFIED) test strip Use to test blood sugar 3 times daily or as directed. Any covered brand that works with meter. 300 strip 1    blood glucose monitoring (ACCU-CHEK FASTCLIX) lancets USE TO TEST 3 TIMES DAILY OR AS DIRECTED. 300 each 1    blood glucose monitoring (SOFTCLIX) lancets Use to test blood sugar 1-2 times daily. 250 each 5    blood glucose monitoring (SOFTCLIX) lancets Use to test blood sugar 3 times daily. 300 each 6    Blood Glucose Monitoring Suppl (ACCU-CHEK GUIDE) w/Device KIT USE TO TEST BLOOD SUGAR THREE TIMES DAILY AS DIRECTED.      cyanocobalamin (VITAMIN B-12) 500 MCG tablet Take 500 mcg by mouth daily      empagliflozin (JARDIANCE) 10 MG TABS tablet Take 1 tablet (10 mg) by mouth daily 90 tablet 2    loratadine (CLARITIN) 10 MG tablet Take 10 mg by mouth every evening Patient takes at bedtime      magnesium oxide (MAG-OX) 400 MG tablet Take 1 tablet (400 mg) by mouth 2 times daily 180 tablet 3    metFORMIN (GLUCOPHAGE XR) 500 MG 24 hr tablet Take 4 tabs every  tablet 1    Multiple Vitamins-Minerals (HAIR SKIN NAILS PO) Take by mouth every morning      mycophenolate (GENERIC EQUIVALENT) 250 MG capsule Take 2 capsules (500 mg) by mouth 2 times daily 120 capsule 11    mycophenolate (GENERIC EQUIVALENT) 250 MG capsule Take 2 capsules (500 mg) by mouth 2 times daily 120 capsule 11    OMEPRAZOLE PO Take 20 mg by mouth every morning      sulfamethoxazole-trimethoprim (BACTRIM) 400-80 MG  tablet Take 1 tablet by mouth every morning 90 tablet 3    tacrolimus (GENERIC EQUIVALENT) 0.5 MG capsule Take 1 capsule (0.5 mg) by mouth 2 times daily 180 capsule 3    tacrolimus (GENERIC EQUIVALENT) 1 MG capsule ON HOLD due to dose change 90 capsule 3    thin (NO BRAND SPECIFIED) lancets Use with lanceting device 3x daily. Any covered brand that works with lancing device. 300 each 1     Current Facility-Administered Medications   Medication Dose Route Frequency Provider Last Rate Last Admin    cilgavimab 300 mg/tixagevimab 300 mg (EVUSHELD) - FULL DOSE  6 mL Intramuscular Once Dean Phillip MD         Facility-Administered Medications Ordered in Other Visits   Medication Dose Route Frequency Provider Last Rate Last Admin    diatrizoate meglumine-sodium (GASTROGRAFIN/GASTROVIEW) 66-10 % solution 480 mL  480 mL Rectal Once Christopher Baker MD          Past Medical History:   Patient Active Problem List   Diagnosis    GERD (gastroesophageal reflux disease)    Allergic rhinitis    Anemia in chronic renal disease    Dyslipidemia    MGUS (monoclonal gammopathy of unknown significance)    Ascending aortic aneurysm (H24)    Heart replaced by transplant (H)    Immunosuppressed status (H24)    IPMN (intraductal papillary mucinous neoplasm)    Kidney replaced by transplant    Aftercare following organ transplant    HTN, kidney transplant related    Secondary renal hyperparathyroidism (H24)    Vitamin D deficiency    Need for pneumocystis prophylaxis    Erectile dysfunction, unspecified erectile dysfunction type    Nocturia    History of hernia repair    Decreased hearing, unspecified laterality    Overactive bladder    Diabetes mellitus, type 2 (H)    Hypercalcemia    CKD (chronic kidney disease) stage 2, GFR 60-89 ml/min    Acute deep vein thrombosis (DVT) of distal vein of right lower extremity (H)    Acute pain of both knees    Encounter for long-term (current) use of medications    Status post coronary  angiogram     Past Medical History:   Diagnosis Date    (HFpEF) heart failure with preserved ejection fraction (H)     Allergic rhinitis, cause unspecified     Anemia of chronic kidney failure     Aortic stenosis 08/13/2008    Overview:  Bicuspid aortic valve    AS (aortic stenosis)     Ascending aortic aneurysm (H24)     Bicuspid aortic valve     Bilateral hand pain 06/01/2021    CAD (coronary artery disease)     Congestive heart failure, unspecified     Coronary artery disease involving native artery of transplanted heart without angina pectoris 07/10/2014    Dialysis patient (H24)     Tues-Thur-Sat    Dyslipidemia     Esophageal reflux     ESRD (end stage renal disease) (H)     Hearing problem     Heart replaced by transplant (H) 12/10/2018    Hypersomnia with sleep apnea, unspecified     Hypertension     Ileostomy status (H)     Immunosuppression (H24)     MGUS (monoclonal gammopathy of unknown significance)     Mitral regurgitation     SHEELA (obstructive sleep apnea)     No CPAP    Pneumonia     Sigmoid diverticulitis 08/14/2018    Status post coronary angiogram 06/28/2019    Systolic heart failure (H)     Type 2 diabetes mellitus (H)     Ventral hernia without obstruction or gangrene        CC Referred Self, MD  No address on file on close of this encounter.

## 2024-07-22 NOTE — PATIENT INSTRUCTIONS
Patient Education        Proper skin care from [facility]:     -Eliminate harsh soaps as they strip the natural oils from the skin, often resulting in dry itchy skin ( i.e. Dial, Zest, Maltese Spring)  -Use mild soaps such as Cetaphil or Dove Sensitive Skin in the shower. You do not need to use soap on arms, legs, and trunk every time you shower unless visibly soiled.   -Avoid hot or cold showers.  -After showering, lightly dry off and apply moisturizing within 2-3 minutes. This will help trap moisture in the skin.   -Aggressive use of a moisturizer at least 1-2 times a day to the entire body (including -Vanicream, Cetaphil, Aquaphor or Cerave) and moisturize hands after every washing.  -We recommend using moisturizers that come in a tub that needs to be scooped out, not a pump. This has more of an oil base. It will hold moisture in your skin much better than a water base moisturizer. The above recommended are non-pore clogging.        Wear a sunscreen with at least SPF 30 on your face, ears, neck and V of the chest daily. Wear sunscreen on other areas of the body if those areas are exposed to the sun throughout the day. Sunscreens can contain physical and/or chemical blockers. Physical blockers are less likely to clog pores, these include zinc oxide and titanium dioxide. Reapply every two hour and after swimming.      Sunscreen examples: https://www.ewg.org/sunscreen/     UV radiation  UVA radiation remains constant throughout the day and throughout the year. It is a longer wavelength than UVB and therefore penetrates deeper into the skin leading to immediate and delayed tanning, photoaging, and skin cancer. 70-80% of UVA and UVB radiation occurs between the hours of 10am-2pm.  UVB radiation  UVB radiation causes the most harmful effects and is more significant during the summer months. However, snow and ice can reflect UVB radiation leading to skin damage during the winter months as well. UVB radiation is  responsible for tanning, burning, inflammation, delayed erythema (pinkness), pigmentation (brown spots), and skin cancer.      I recommend self monthly full body exams and yearly full body exams with a dermatology provider. If you develop a new or changing lesion please follow up for examination. Most skin cancers are pink and scaly or pink and pearly. However, we do see blue/brown/black skin cancers.  Consider the ABCDEs of melanoma when giving yourself your monthly full body exam ( don't forget the groin, buttocks, feet, toes, etc). A-asymmetry, B-borders, C-color, D-diameter, E-elevation or evolving. If you see any of these changes please follow up in clinic. If you cannot see your back I recommend purchasing a hand held mirror to use with a larger wall mirror.       Checking for Skin Cancer  You can find cancer early by checking your skin each month. There are 3 kinds of skin cancer. They are melanoma, basal cell carcinoma, and squamous cell carcinoma. Doing monthly skin checks is the best way to find new marks or skin changes. Follow the instructions below for checking your skin.   The ABCDEs of checking moles for melanoma   Check your moles or growths for signs of melanoma using ABCDE:   Asymmetry: the sides of the mole or growth don t match  Border: the edges are ragged, notched, or blurred  Color: the color within the mole or growth varies  Diameter: the mole or growth is larger than 6 mm (size of a pencil eraser)  Evolving: the size, shape, or color of the mole or growth is changing (evolving is not shown in the images below)    Checking for other types of skin cancer  Basal cell carcinoma or squamous cell carcinoma have symptoms such as:      A spot or mole that looks different from all other marks on your skin  Changes in how an area feels, such as itching, tenderness, or pain  Changes in the skin's surface, such as oozing, bleeding, or scaliness  A sore that does not heal  New swelling or redness beyond  the border of a mole     Who s at risk?  Anyone can get skin cancer. But you are at greater risk if you have:   Fair skin, light-colored hair, or light-colored eyes  Many moles or abnormal moles on your skin  A history of sunburns from sunlight or tanning beds  A family history of skin cancer  A history of exposure to radiation or chemicals  A weakened immune system  If you have had skin cancer in the past, you are at risk for recurring skin cancer.   How to check your skin  Do your monthly skin checkups in front of a full-length mirror. Check all parts of your body, including your:   Head (ears, face, neck, and scalp)  Torso (front, back, and sides)  Arms (tops, undersides, upper, and lower armpits)  Hands (palms, backs, and fingers, including under the nails)  Buttocks and genitals  Legs (front, back, and sides)  Feet (tops, soles, toes, including under the nails, and between toes)  If you have a lot of moles, take digital photos of them each month. Make sure to take photos both up close and from a distance. These can help you see if any moles change over time.   Most skin changes are not cancer. But if you see any changes in your skin, call your doctor right away. Only he or she can diagnose a problem. If you have skin cancer, seeing your doctor can be the first step toward getting the treatment that could save your life.   [email or web address] last reviewed this educational content on 4/1/2019 2000-2020 [employer]. [address] [place] [address], [place] [address]. All rights reserved. This information is not intended as a substitute for professional medical care. Always follow your healthcare professional's instructions.        When should I call my doctor?  If you are worsening or not improving, please, contact us or seek urgent care as noted below.      Who should I call with questions (adults)?  [facility] (adult and pediatric): [phone]  [facility]  [place] (adult): [phone]  [facility] ([place], [place], [place] and [place]) [phone]  For urgent needs outside of business hours call the [facility] at [phone] and ask for the dermatology resident on call to be paged  If this is a medical emergency and you are unable to reach an ER, Call 911        If you need a prescription refill, please contact your pharmacy. Refills are approved or denied by our Physicians during normal business hours, Monday through Fridays  Per office policy, refills will not be granted if you have not been seen within the past year (or sooner depending on your child's condition)

## 2024-07-22 NOTE — NURSING NOTE
Dermatology Rooming Note    Murray Nicholson's goals for this visit include:   Chief Complaint   Patient presents with    Skin Check     Murray is here today for a skin check. He does not have any areas of concern.      Valentin ESTRADA CMA

## 2024-07-23 NOTE — RESULT ENCOUNTER NOTE
"Per report, normal filling pressures, normal PA  pressures, normal cardiac output, and \"Mild-to-moderate coronary artery stenosis without hemodynamically significant lesions\". Sharing results with Dr. Ernst. "

## 2024-07-24 ENCOUNTER — TELEPHONE (OUTPATIENT)
Dept: DERMATOLOGY | Facility: CLINIC | Age: 69
End: 2024-07-24
Payer: MEDICARE

## 2024-07-24 NOTE — TELEPHONE ENCOUNTER
Left Voicemail (1st Attempt) and Sent Mychart (1st Attempt) for the patient to call back and schedule the following:    Appointment type: Return dermatology  Provider: Talia Hernandez PA-C  Return date: Approx. 7/22/25  Specialty phone number: [phone]    Additonal Notes:

## 2024-07-25 ENCOUNTER — DOCUMENTATION ONLY (OUTPATIENT)
Dept: TRANSPLANT | Facility: CLINIC | Age: 69
End: 2024-07-25
Payer: MEDICARE

## 2024-07-25 NOTE — PROGRESS NOTES
"Regarding patient's latest RHC and coronary angiogram on 7/15/24:   Per report, normal filling pressures, normal PA  pressures, normal cardiac output, and \"Mild-to-moderate coronary artery stenosis without hemodynamically significant lesions\". Results evaluated by Dr. Ernst.     Per Dr. Ernst,the patient \"had these lesions even on his one month angiogram after the transplant. This is likely donor coronary artery disease rather than CAV. These lesions have not progressed.\" Therefore, no changes to be implemented to the patient's current immunosuppression plan.   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 Pt with c/o worsening L sided paresthesia in December at [facility]. Found to have subacute SDH along R frontal lobe and large mass in third ventricle. Pt transferred to rehab facility. Pt now presents s/p R craniotomy for removal of brain mass. Dopplers (-) for DVTs.

## 2024-07-26 NOTE — TELEPHONE ENCOUNTER
Left Voicemail (2nd Attempt) for the patient to call back and schedule the following:    Appointment type: Return dermatology  Provider: Talia Hernandez PA-C  Return date: Approx. 7/22/25  Specialty phone number: [phone]

## 2024-07-31 DIAGNOSIS — E11.22 TYPE 2 DIABETES MELLITUS WITH CHRONIC KIDNEY DISEASE, WITHOUT LONG-TERM CURRENT USE OF INSULIN, UNSPECIFIED CKD STAGE (H): Primary | ICD-10-CM

## 2024-08-01 NOTE — PROGRESS NOTES
Pre-visit planning and chart review completed.     RETURN patient appointment:    8/22 with Dr. Murguia  8/19 CT chest wo contrast    - 6 month follow-up.  - On Aspirin.  - Former smoker.  - Hx of Heart Transplant (2018) and Kidney Transplant (2019).    CE updated. Medications, allergies, problem list, and immunizations reconciled.

## 2024-08-02 NOTE — OR NURSING
Dr. Garduno viewed CXR, states OK for use.  He is aware of  and does not want to treat it   HCC coding opportunities       Chart reviewed, no opportunity found: CHART REVIEWED, NO OPPORTUNITY FOUND        Patients Insurance     Medicare Insurance: [employer] Advantage

## 2024-08-15 ENCOUNTER — MYC REFILL (OUTPATIENT)
Dept: CARDIOLOGY | Facility: CLINIC | Age: 69
End: 2024-08-15
Payer: MEDICARE

## 2024-08-15 DIAGNOSIS — Z94.0 KIDNEY REPLACED BY TRANSPLANT: ICD-10-CM

## 2024-08-15 DIAGNOSIS — Z94.0 KIDNEY TRANSPLANTED: ICD-10-CM

## 2024-08-15 DIAGNOSIS — I10 PRIMARY HYPERTENSION: ICD-10-CM

## 2024-08-15 DIAGNOSIS — E87.20 METABOLIC ACIDOSIS: ICD-10-CM

## 2024-08-15 DIAGNOSIS — Z94.1 HEART REPLACED BY TRANSPLANT (H): ICD-10-CM

## 2024-08-15 RX ORDER — AMLODIPINE BESYLATE 5 MG/1
5 TABLET ORAL DAILY
Qty: 90 TABLET | Refills: 3 | Status: SHIPPED | OUTPATIENT
Start: 2024-08-15

## 2024-08-15 RX ORDER — TACROLIMUS 0.5 MG/1
0.5 CAPSULE ORAL 2 TIMES DAILY
Qty: 180 CAPSULE | Refills: 3 | Status: SHIPPED | OUTPATIENT
Start: 2024-08-15

## 2024-08-19 ENCOUNTER — ANCILLARY PROCEDURE (OUTPATIENT)
Dept: CT IMAGING | Facility: CLINIC | Age: 69
End: 2024-08-19
Attending: STUDENT IN AN ORGANIZED HEALTH CARE EDUCATION/TRAINING PROGRAM
Payer: MEDICARE

## 2024-08-19 DIAGNOSIS — R91.1 LUNG NODULE: ICD-10-CM

## 2024-08-19 PROCEDURE — 71250 CT THORAX DX C-: CPT | Mod: MG | Performed by: RADIOLOGY

## 2024-08-19 PROCEDURE — G1010 CDSM STANSON: HCPCS | Mod: GC | Performed by: RADIOLOGY

## 2024-08-22 ENCOUNTER — ONCOLOGY VISIT (OUTPATIENT)
Dept: PULMONOLOGY | Facility: CLINIC | Age: 69
End: 2024-08-22
Attending: STUDENT IN AN ORGANIZED HEALTH CARE EDUCATION/TRAINING PROGRAM
Payer: MEDICARE

## 2024-08-22 ENCOUNTER — MYC MEDICAL ADVICE (OUTPATIENT)
Dept: PULMONOLOGY | Facility: CLINIC | Age: 69
End: 2024-08-22

## 2024-08-22 VITALS
OXYGEN SATURATION: 100 % | RESPIRATION RATE: 16 BRPM | HEART RATE: 68 BPM | WEIGHT: 175 LBS | DIASTOLIC BLOOD PRESSURE: 88 MMHG | TEMPERATURE: 97.9 F | BODY MASS INDEX: 26.61 KG/M2 | SYSTOLIC BLOOD PRESSURE: 131 MMHG

## 2024-08-22 DIAGNOSIS — R91.1 LUNG NODULE: Primary | ICD-10-CM

## 2024-08-22 PROCEDURE — G0463 HOSPITAL OUTPT CLINIC VISIT: HCPCS | Performed by: STUDENT IN AN ORGANIZED HEALTH CARE EDUCATION/TRAINING PROGRAM

## 2024-08-22 PROCEDURE — 99214 OFFICE O/P EST MOD 30 MIN: CPT | Performed by: STUDENT IN AN ORGANIZED HEALTH CARE EDUCATION/TRAINING PROGRAM

## 2024-08-22 ASSESSMENT — PAIN SCALES - GENERAL: PAINLEVEL: NO PAIN (0)

## 2024-08-22 NOTE — NURSING NOTE
"Oncology Rooming Note    August 22, 2024 9:36 AM   Murray Nicholson is a 69 year old male who presents for:    Chief Complaint   Patient presents with    Oncology Clinic Visit     Lung nodule     Initial Vitals: /88 (BP Location: Right arm, Patient Position: Sitting, Cuff Size: Adult Regular)   Pulse 68   Temp 97.9  F (36.6  C) (Oral)   Resp 16   Wt 79.4 kg (175 lb)   SpO2 100%   BMI 26.61 kg/m   Estimated body mass index is 26.61 kg/m  as calculated from the following:    Height as of 7/15/24: 1.727 m (5' 8\").    Weight as of this encounter: 79.4 kg (175 lb). Body surface area is 1.95 meters squared.  No Pain (0) Comment: Data Unavailable   No LMP for male patient.  Allergies reviewed: Yes  Medications reviewed: Yes    Medications: Medication refills not needed today.  Pharmacy name entered into EPIC:    [facility] - A MAIL ORDER [facility]/PHARMACY #[id number] - [place], [place] -  [address]    Frailty Screening:   Is the patient here for a new oncology consult visit in cancer care? 2. No      Clinical concerns:  wondering about follow up or monitoring      Ericka Gonzalez              "

## 2024-08-22 NOTE — PROGRESS NOTES
LUNG NODULE & INTERVENTIONAL PULMONARY CLINIC  [facility]    Murray Nicholson MRN# [id number]   Age: 69 year old YOB: 1955     Reason for Consultation: lung nodule    Requesting Physician: Alejandro Murguia DO  [address], [facility] [address]  [place] [address]     Assessment and Plan:    Murray Nicholson is a 69 year old male who presents for evaluation of a lung nodule.    I independently reviewed their CT scan from 8/19/24 which reveals stable in size LLL GGO, however, compared to 2018 it does appear more dense to me. I would continue following this with a repeat Ct in 6 months. The other small GAGE nodule is stable.       Patient indicated understanding and agreed to the plan of care. All questions answered.     Alejandro Murguia DO   of Medicine  Interventional Pulmonology  Department of Pulmonary, Allergy, Critical Care and Sleep Medicine   [facility]     History:  Murray Nicholson is a 69 year old male who presents for evaluation of a lung nodule.   No changes to his health.   Having  a good summer. Went to [place].   No fevers, chills or cough.   Former smoker.   Post heart and kidney transplant.       Medications:  Current Outpatient Medications   Medication Sig Dispense Refill    alcohol swab prep pads Use to swab area of injection/davida as directed. 300 each 6    amLODIPine (NORVASC) 5 MG tablet Take 1 tablet (5 mg) by mouth daily 90 tablet 3    aspirin 81 MG EC tablet Take 81 mg by mouth every evening      atorvastatin (LIPITOR) 40 MG tablet Take 1 tablet (40 mg) by mouth every evening 30 tablet 11    Biotin 10 MG CAPS Take 10 mg by mouth daily      blood glucose (ACCU-CHEK GUIDE) test strip Use to test blood sugar 3 times daily or as directed. 300 strip 1    blood glucose (ACCU-CHEK SOFTCLIX) lancing device Lancing device to be used with lancets. Test blood sugar 3 times daily or as directed 1 each 0    blood glucose (NO BRAND SPECIFIED)  test strip Use to test blood sugar 3 times daily or as directed. Any covered brand that works with meter. 300 strip 1    blood glucose monitoring (ACCU-CHEK FASTCLIX) lancets USE TO TEST 3 TIMES DAILY OR AS DIRECTED. 300 each 1    blood glucose monitoring (SOFTCLIX) lancets Use to test blood sugar 1-2 times daily. 250 each 5    blood glucose monitoring (SOFTCLIX) lancets Use to test blood sugar 3 times daily. 300 each 6    Blood Glucose Monitoring Suppl (ACCU-CHEK GUIDE) w/Device KIT USE TO TEST BLOOD SUGAR THREE TIMES DAILY AS DIRECTED.      cyanocobalamin (VITAMIN B-12) 500 MCG tablet Take 500 mcg by mouth daily      empagliflozin (JARDIANCE) 10 MG TABS tablet Take 1 tablet (10 mg) by mouth daily 90 tablet 2    loratadine (CLARITIN) 10 MG tablet Take 10 mg by mouth every evening Patient takes at bedtime      magnesium oxide (MAG-OX) 400 MG tablet Take 1 tablet (400 mg) by mouth 2 times daily 180 tablet 3    metFORMIN (GLUCOPHAGE XR) 500 MG 24 hr tablet Take 4 tabs every  tablet 1    Multiple Vitamins-Minerals (HAIR SKIN NAILS PO) Take by mouth every morning      mycophenolate (GENERIC EQUIVALENT) 250 MG capsule Take 2 capsules (500 mg) by mouth 2 times daily 120 capsule 11    mycophenolate (GENERIC EQUIVALENT) 250 MG capsule Take 2 capsules (500 mg) by mouth 2 times daily 120 capsule 11    OMEPRAZOLE PO Take 20 mg by mouth every morning      sulfamethoxazole-trimethoprim (BACTRIM) 400-80 MG tablet Take 1 tablet by mouth every morning 90 tablet 3    tacrolimus (GENERIC EQUIVALENT) 0.5 MG capsule Take 1 capsule (0.5 mg) by mouth 2 times daily 180 capsule 3    tacrolimus (GENERIC EQUIVALENT) 1 MG capsule ON HOLD due to dose change 90 capsule 3    thin (NO BRAND SPECIFIED) lancets Use with lanceting device 3x daily. Any covered brand that works with lancing device. 300 each 1     Current Facility-Administered Medications   Medication Dose Route Frequency Provider Last Rate Last Admin    cilgavimab 300  mg/tixagevimab 300 mg (EVUSHELD) - FULL DOSE  6 mL Intramuscular Once Dean Phillip MD         Facility-Administered Medications Ordered in Other Visits   Medication Dose Route Frequency Provider Last Rate Last Admin    diatrizoate meglumine-sodium (GASTROGRAFIN/GASTROVIEW) 66-10 % solution 480 mL  480 mL Rectal Once Christopher Baker MD             Physical exam:  /88 (BP Location: Right arm, Patient Position: Sitting, Cuff Size: Adult Regular)   Pulse 68   Temp 97.9  F (36.6  C) (Oral)   Resp 16   Wt 79.4 kg (175 lb)   SpO2 100%   BMI 26.61 kg/m    Wt Readings from Last 4 Encounters:   08/22/24 79.4 kg (175 lb)   07/16/24 77.7 kg (171 lb 3.2 oz)   07/15/24 78.2 kg (172 lb 6.4 oz)   06/24/24 77.6 kg (171 lb)     General: Well appearing, nonlabored breathing  Neuro: Answering questions appropriately  Psych: Normal affect

## 2024-09-12 ENCOUNTER — LAB (OUTPATIENT)
Dept: LAB | Facility: CLINIC | Age: 69
End: 2024-09-12
Attending: INTERNAL MEDICINE
Payer: MEDICARE

## 2024-09-12 DIAGNOSIS — Z48.298 AFTERCARE FOLLOWING ORGAN TRANSPLANT: ICD-10-CM

## 2024-09-12 DIAGNOSIS — Z79.899 ENCOUNTER FOR LONG-TERM CURRENT USE OF MEDICATION: ICD-10-CM

## 2024-09-12 DIAGNOSIS — Z98.890 OTHER SPECIFIED POSTPROCEDURAL STATES: ICD-10-CM

## 2024-09-12 DIAGNOSIS — Z94.0 KIDNEY REPLACED BY TRANSPLANT: ICD-10-CM

## 2024-09-12 DIAGNOSIS — N18.2 CKD (CHRONIC KIDNEY DISEASE) STAGE 2, GFR 60-89 ML/MIN: ICD-10-CM

## 2024-09-12 LAB
ANION GAP SERPL CALCULATED.3IONS-SCNC: 11 MMOL/L (ref 7–15)
BUN SERPL-MCNC: 14.9 MG/DL (ref 8–23)
CALCIUM SERPL-MCNC: 9.8 MG/DL (ref 8.8–10.4)
CHLORIDE SERPL-SCNC: 103 MMOL/L (ref 98–107)
CREAT SERPL-MCNC: 1.02 MG/DL (ref 0.67–1.17)
EGFRCR SERPLBLD CKD-EPI 2021: 80 ML/MIN/1.73M2
ERYTHROCYTE [DISTWIDTH] IN BLOOD BY AUTOMATED COUNT: 13.5 % (ref 10–15)
GLUCOSE SERPL-MCNC: 168 MG/DL (ref 70–99)
HCO3 SERPL-SCNC: 24 MMOL/L (ref 22–29)
HCT VFR BLD AUTO: 42.1 % (ref 40–53)
HGB BLD-MCNC: 13.6 G/DL (ref 13.3–17.7)
MAGNESIUM SERPL-MCNC: 1.7 MG/DL (ref 1.7–2.3)
MCH RBC QN AUTO: 28.6 PG (ref 26.5–33)
MCHC RBC AUTO-ENTMCNC: 32.3 G/DL (ref 31.5–36.5)
MCV RBC AUTO: 88 FL (ref 78–100)
PLATELET # BLD AUTO: 201 10E3/UL (ref 150–450)
POTASSIUM SERPL-SCNC: 3.9 MMOL/L (ref 3.4–5.3)
RBC # BLD AUTO: 4.76 10E6/UL (ref 4.4–5.9)
SODIUM SERPL-SCNC: 138 MMOL/L (ref 135–145)
TACROLIMUS BLD-MCNC: 4.4 UG/L (ref 5–15)
TME LAST DOSE: ABNORMAL H
TME LAST DOSE: ABNORMAL H
WBC # BLD AUTO: 5.5 10E3/UL (ref 4–11)

## 2024-09-12 PROCEDURE — 85027 COMPLETE CBC AUTOMATED: CPT | Performed by: PATHOLOGY

## 2024-09-12 PROCEDURE — 36415 COLL VENOUS BLD VENIPUNCTURE: CPT | Performed by: PATHOLOGY

## 2024-09-12 PROCEDURE — 99000 SPECIMEN HANDLING OFFICE-LAB: CPT | Performed by: PATHOLOGY

## 2024-09-12 PROCEDURE — 83735 ASSAY OF MAGNESIUM: CPT | Performed by: PATHOLOGY

## 2024-09-12 PROCEDURE — 80197 ASSAY OF TACROLIMUS: CPT | Performed by: INTERNAL MEDICINE

## 2024-09-12 PROCEDURE — 80048 BASIC METABOLIC PNL TOTAL CA: CPT | Performed by: PATHOLOGY

## 2024-09-13 DIAGNOSIS — E11.29 TYPE 2 DIABETES MELLITUS WITH OTHER DIABETIC KIDNEY COMPLICATION, WITHOUT LONG-TERM CURRENT USE OF INSULIN (H): ICD-10-CM

## 2024-09-16 ENCOUNTER — MYC MEDICAL ADVICE (OUTPATIENT)
Dept: NEPHROLOGY | Facility: CLINIC | Age: 69
End: 2024-09-16
Payer: MEDICARE

## 2024-09-17 ENCOUNTER — MYC MEDICAL ADVICE (OUTPATIENT)
Dept: FAMILY MEDICINE | Facility: CLINIC | Age: 69
End: 2024-09-17
Payer: MEDICARE

## 2024-09-19 RX ORDER — METFORMIN HCL 500 MG
TABLET, EXTENDED RELEASE 24 HR ORAL
Qty: 360 TABLET | Refills: 0 | Status: SHIPPED | OUTPATIENT
Start: 2024-09-19

## 2024-09-19 NOTE — TELEPHONE ENCOUNTER
Message sent via [employer].    Heide Bradley, RN, BSN, PHN  [facility]  [phone]

## 2024-09-23 DIAGNOSIS — E11.29 TYPE 2 DIABETES MELLITUS WITH OTHER DIABETIC KIDNEY COMPLICATION, WITHOUT LONG-TERM CURRENT USE OF INSULIN (H): ICD-10-CM

## 2024-09-23 RX ORDER — LANCING DEVICE/LANCETS
KIT MISCELLANEOUS
Qty: 1 KIT | Refills: 0 | Status: SHIPPED | OUTPATIENT
Start: 2024-09-23 | End: 2024-09-24

## 2024-09-24 RX ORDER — LANCING DEVICE/LANCETS
KIT MISCELLANEOUS
Qty: 1 KIT | Refills: 0 | Status: SHIPPED | OUTPATIENT
Start: 2024-09-24

## 2024-09-25 ENCOUNTER — MYC MEDICAL ADVICE (OUTPATIENT)
Dept: FAMILY MEDICINE | Facility: CLINIC | Age: 69
End: 2024-09-25
Payer: MEDICARE

## 2024-09-27 NOTE — TELEPHONE ENCOUNTER
Writer responded via [employer].  Mago NELSON, BSN, PHN, RN-[facility]  [phone]

## 2024-10-08 ENCOUNTER — HOSPITAL ENCOUNTER (OUTPATIENT)
Dept: RESEARCH | Facility: CLINIC | Age: 69
Discharge: HOME | End: 2024-10-08
Attending: INTERNAL MEDICINE | Admitting: INTERNAL MEDICINE
Payer: MEDICARE

## 2024-10-08 DIAGNOSIS — Z00.6 CLINICAL TRIAL PARTICIPANT: Primary | ICD-10-CM

## 2024-10-08 PROCEDURE — 510N000017 HC CRU PATIENT CARE, PER 15 MIN

## 2024-10-08 PROCEDURE — 510N000009 HC RESEARCH FACILITY, PER 15 MIN

## 2024-10-10 ENCOUNTER — MYC REFILL (OUTPATIENT)
Dept: TRANSPLANT | Facility: CLINIC | Age: 69
End: 2024-10-10
Payer: MEDICARE

## 2024-10-10 DIAGNOSIS — E83.42 HYPOMAGNESEMIA: ICD-10-CM

## 2024-10-10 DIAGNOSIS — Z94.1 HEART REPLACED BY TRANSPLANT (H): ICD-10-CM

## 2024-10-10 RX ORDER — MAGNESIUM OXIDE 400 MG/1
400 TABLET ORAL 2 TIMES DAILY
Qty: 180 TABLET | Refills: 3 | Status: SHIPPED | OUTPATIENT
Start: 2024-10-10

## 2024-10-10 RX ORDER — MYCOPHENOLATE MOFETIL 250 MG/1
500 CAPSULE ORAL 2 TIMES DAILY
Qty: 120 CAPSULE | Refills: 11 | Status: SHIPPED | OUTPATIENT
Start: 2024-10-10

## 2024-10-10 RX ORDER — ATORVASTATIN CALCIUM 40 MG/1
40 TABLET, FILM COATED ORAL EVERY EVENING
Qty: 30 TABLET | Refills: 11 | Status: SHIPPED | OUTPATIENT
Start: 2024-10-10

## 2024-10-13 RX ORDER — ATORVASTATIN CALCIUM 40 MG/1
40 TABLET, FILM COATED ORAL EVERY EVENING
Qty: 30 TABLET | Refills: 11 | OUTPATIENT
Start: 2024-10-13

## 2024-10-17 ENCOUNTER — TELEPHONE (OUTPATIENT)
Dept: ENDOCRINOLOGY | Facility: CLINIC | Age: 69
End: 2024-10-17
Payer: MEDICARE

## 2024-10-17 NOTE — TELEPHONE ENCOUNTER
Patient confirmed scheduled appointment:  Date: 1/10   Time: 1:30   Visit type: return diabetes   Provider: jossue   Location: [facility] virtual   Testing/imaging:   Additional notes: Spoke to pt and re-krissy appt on 11/1 to next avail due to pt being unavailable per below message.  Pt message:  I am sorry but I need to Re-schedule our November 1st    Video Visit.  Thank you   Barbara.    Armida Rhodes on 10/17/2024 at 11:13 AM

## 2024-11-13 NOTE — PROGRESS NOTES
D/reviewed dc form, and instructions on gout management enclosed. Reviewed list of meds due for today. He prefers to take Lasix when he is at home of course. He has meds from the [facility], and NO valuables in safe. He has cell phone and , PIV removed. I checked his room for belongings. He says he has everything. He has paper script for prednisone and meds are ready in pharmacy. He prefers to walk to pharmacy, and has a ride picking him up for today. He told me that he felt instructions were very helpful and complete. Left around 1700   Diagnoses and all orders for this visit:  Viral syndrome    Viral syndrome.  We will plan for symptomatic care with ibuprofen, acetaminophen, fluids, and humidity.  Fevers if present can last 4-5 days total and congestion and coughing will likely last longer, sometimes up to 2 weeks total. Call back for increasing or new fevers, worsening or new symptoms such as ear pain or trouble breathing, or no improvement.

## 2024-11-14 ENCOUNTER — HOSPITAL ENCOUNTER (OUTPATIENT)
Dept: RESEARCH | Facility: CLINIC | Age: 69
Discharge: HOME | End: 2024-11-14
Attending: INTERNAL MEDICINE | Admitting: INTERNAL MEDICINE
Payer: MEDICARE

## 2024-11-14 DIAGNOSIS — Z00.6 CLINICAL TRIAL PARTICIPANT: Primary | ICD-10-CM

## 2024-11-14 PROCEDURE — 510N000017 HC CRU PATIENT CARE, PER 15 MIN

## 2024-11-14 PROCEDURE — 999N000129 HC STATISTIC PICC/MID LINE PROC CANCELLED SUBQ WORKUP

## 2024-11-14 PROCEDURE — 510N000009 HC RESEARCH FACILITY, PER 15 MIN

## 2024-11-18 ENCOUNTER — LAB (OUTPATIENT)
Dept: LAB | Facility: CLINIC | Age: 69
End: 2024-11-18
Payer: MEDICARE

## 2024-11-18 DIAGNOSIS — Z98.890 OTHER SPECIFIED POSTPROCEDURAL STATES: ICD-10-CM

## 2024-11-18 DIAGNOSIS — Z94.0 KIDNEY REPLACED BY TRANSPLANT: ICD-10-CM

## 2024-11-18 DIAGNOSIS — Z48.298 AFTERCARE FOLLOWING ORGAN TRANSPLANT: ICD-10-CM

## 2024-11-18 DIAGNOSIS — Z79.899 ENCOUNTER FOR LONG-TERM CURRENT USE OF MEDICATION: ICD-10-CM

## 2024-11-18 LAB
ALBUMIN MFR UR ELPH: 18.1 MG/DL
ANION GAP SERPL CALCULATED.3IONS-SCNC: 8 MMOL/L (ref 7–15)
BUN SERPL-MCNC: 13.6 MG/DL (ref 8–23)
CALCIUM SERPL-MCNC: 9.8 MG/DL (ref 8.8–10.4)
CHLORIDE SERPL-SCNC: 105 MMOL/L (ref 98–107)
CREAT SERPL-MCNC: 0.95 MG/DL (ref 0.67–1.17)
CREAT UR-MCNC: 103 MG/DL
EGFRCR SERPLBLD CKD-EPI 2021: 87 ML/MIN/1.73M2
ERYTHROCYTE [DISTWIDTH] IN BLOOD BY AUTOMATED COUNT: 13.2 % (ref 10–15)
GLUCOSE SERPL-MCNC: 129 MG/DL (ref 70–99)
HCO3 SERPL-SCNC: 26 MMOL/L (ref 22–29)
HCT VFR BLD AUTO: 42.2 % (ref 40–53)
HGB BLD-MCNC: 14 G/DL (ref 13.3–17.7)
MCH RBC QN AUTO: 29.2 PG (ref 26.5–33)
MCHC RBC AUTO-ENTMCNC: 33.2 G/DL (ref 31.5–36.5)
MCV RBC AUTO: 88 FL (ref 78–100)
PLATELET # BLD AUTO: 228 10E3/UL (ref 150–450)
POTASSIUM SERPL-SCNC: 4.1 MMOL/L (ref 3.4–5.3)
PROT/CREAT 24H UR: 0.18 MG/MG CR (ref 0–0.2)
RBC # BLD AUTO: 4.8 10E6/UL (ref 4.4–5.9)
SODIUM SERPL-SCNC: 139 MMOL/L (ref 135–145)
TACROLIMUS BLD-MCNC: 4.3 UG/L (ref 5–15)
TME LAST DOSE: ABNORMAL H
TME LAST DOSE: ABNORMAL H
WBC # BLD AUTO: 4 10E3/UL (ref 4–11)

## 2024-11-18 PROCEDURE — 80048 BASIC METABOLIC PNL TOTAL CA: CPT | Performed by: PATHOLOGY

## 2024-11-18 PROCEDURE — 80197 ASSAY OF TACROLIMUS: CPT | Performed by: INTERNAL MEDICINE

## 2024-11-18 PROCEDURE — 36415 COLL VENOUS BLD VENIPUNCTURE: CPT | Performed by: PATHOLOGY

## 2024-11-18 PROCEDURE — 85027 COMPLETE CBC AUTOMATED: CPT | Performed by: PATHOLOGY

## 2024-11-18 PROCEDURE — 84156 ASSAY OF PROTEIN URINE: CPT | Performed by: PATHOLOGY

## 2024-11-18 PROCEDURE — 99000 SPECIMEN HANDLING OFFICE-LAB: CPT | Performed by: PATHOLOGY

## 2024-11-19 NOTE — RESULT ENCOUNTER NOTE
Tacrolimus is 4.3 and within goal (4-6). Trough is ~ 12.5 hrs, per lab details. No need to change current Tacrolimus dose of 0.5 mg BID.

## 2024-12-11 ENCOUNTER — LAB REQUISITION (OUTPATIENT)
Dept: LAB | Facility: CLINIC | Age: 69
End: 2024-12-11
Payer: MEDICARE

## 2024-12-15 DIAGNOSIS — E11.29 TYPE 2 DIABETES MELLITUS WITH OTHER DIABETIC KIDNEY COMPLICATION, WITHOUT LONG-TERM CURRENT USE OF INSULIN (H): ICD-10-CM

## 2024-12-19 RX ORDER — METFORMIN HYDROCHLORIDE 500 MG/1
TABLET, EXTENDED RELEASE ORAL
Qty: 360 TABLET | Refills: 0 | Status: SHIPPED | OUTPATIENT
Start: 2024-12-19

## 2024-12-19 NOTE — TELEPHONE ENCOUNTER
metFORMIN (GLUCOPHAGE XR) 500 MG 24 hr tablet 360 tablet 0 9/19/2024           Last Office Visit : 5/2/24  Future Office visit:  1/10/25    Refilled per protocol

## 2024-12-27 NOTE — PROGRESS NOTES
Virtual Visit Details    Type of service:  Video Visit   Video Start Time:   Video End Time:    Originating Location (pt. Location):     Distant Location (provider location):    Platform used for Video Visit:     Outcome for 12/27/24 4:02 PM: [employer] message sent  Paulina Curiel MA  Outcome for 01/07/25 11:59 AM: Glucose readings sent via [employer]  Paulina Curiel MA    Patient is showing 4/5 MNCM met. BP out range   Paulina Curiel MA

## 2024-12-29 ENCOUNTER — HEALTH MAINTENANCE LETTER (OUTPATIENT)
Age: 69
End: 2024-12-29

## 2025-01-10 ENCOUNTER — VIRTUAL VISIT (OUTPATIENT)
Dept: ENDOCRINOLOGY | Facility: CLINIC | Age: 70
End: 2025-01-10
Payer: MEDICARE

## 2025-01-10 DIAGNOSIS — E11.29 TYPE 2 DIABETES MELLITUS WITH OTHER DIABETIC KIDNEY COMPLICATION, WITHOUT LONG-TERM CURRENT USE OF INSULIN (H): ICD-10-CM

## 2025-01-10 PROCEDURE — 98006 SYNCH AUDIO-VIDEO EST MOD 30: CPT | Performed by: PHYSICIAN ASSISTANT

## 2025-01-10 RX ORDER — METFORMIN HYDROCHLORIDE 500 MG/1
TABLET, EXTENDED RELEASE ORAL
Qty: 360 TABLET | Refills: 3 | Status: SHIPPED | OUTPATIENT
Start: 2025-01-10

## 2025-01-10 NOTE — LETTER
1/10/2025       RE: Murray Nicholson  [address]  [place] [address]     Dear Colleague,    Thank you for referring your patient, Murray Nicholson, to the [facility] [place] at [facility]. Please see a copy of my visit note below.    Virtual Visit Details    Type of service:  Video Visit   Video Start Time:   Video End Time:    Originating Location (pt. Location):     Distant Location (provider location):    Platform used for Video Visit:     Outcome for 12/27/24 4:02 PM: Lingoing message sent  Paulina Curiel MA  Outcome for 01/07/25 11:59 AM: Glucose readings sent via Lingoing  Paulina Curiel MA    Patient is showing 4/5 MNCM met. BP out range   Paulina Curiel MA          Time of start: 1:40 PM  Time of end: 1:55 PM   Providers location: Off-site.  Patient's location: [place].    HPI  Murray Nicholson is a 70 year old male with type 2 diabetes mellitus. Video visit for diabetes follow up today.  Last visit with me was in May 2024.  Patient continues to do well.  Murray gives a hx of type 2 diabetes > 20 years complicated by ESRD- s/p kidney transplant in Dec 2019.   Pt denies known hx of retinopathy or sx of neuropathy.  He has hx CAD- s/p heart transplant in June 2018.  Pt also has hx of HTN, hyperlipidemia, ascending aortic aneurysm and GERD.  For his diabetes, he is currently taking Jardiance 10 mg each am and Metformin 500 mg 4 tabs in am.  Most recent A1C was 6.5 % on 6/12/2024.  He provided me with a few blood sugar readings today which I scanned in his note below.  Most of these blood sugar values are fasting and good values.  On ROS today, he continues to work on making healthy food choices and remains active.    Denies blurred vision, n/v, SOB at rest, cough, fever, chest pain or abd pain.  No diarrhea, dysuria or hematuria.  He denies sx of neuropathy or foot ulcers.  No sx of groin yeast infection.    Diabetes  Care  Retinopathy: none per patient; pt seen by Oph in Fall 2024 without retinopathy.  Nephropathy:hx of ESRD s/p kidney transplant in 12/2019. Urine microalbuminuria negative in July 2024.  Neuropathy: none per patient.  Foot Exam: no exam today.  Taking aspirin: yes.  Lipids: LDL 62 in June 2024. Pt taking Lipitor.  Insulin: none.  DM meds: Metformin and Jardiance.  Testing: glucose meter.            ROS  See under HPI.      Allergies  Allergies   Allergen Reactions     Cats Shortness Of Breath and Anaphylaxis     Penicillins Hives     Shrimp Shortness Of Breath and Anaphylaxis     Isosorbide Nitrate      hypotension     Norco [Hydrocodone-Acetaminophen] Nausea and Vomiting       Medications  Current Outpatient Medications   Medication Sig Dispense Refill     metFORMIN (GLUCOPHAGE XR) 500 MG 24 hr tablet TAKE 4 TABLETS BY MOUTH EVERY MORNING. Please do NOT fill today 1/10/2025. 360 tablet 3     alcohol swab prep pads Use to swab area of injection/davida as directed. 300 each 6     amLODIPine (NORVASC) 5 MG tablet Take 1 tablet (5 mg) by mouth daily 90 tablet 3     aspirin 81 MG EC tablet Take 81 mg by mouth every evening       atorvastatin (LIPITOR) 40 MG tablet Take 1 tablet (40 mg) by mouth every evening. 30 tablet 11     Biotin 10 MG CAPS Take 10 mg by mouth daily       blood glucose (ACCU-CHEK FASTCLIX) lancing device USE WITH LANCETS 1 kit 0     blood glucose (ACCU-CHEK GUIDE) test strip Use to test blood sugar 3 times daily or as directed. 300 strip 1     blood glucose (NO BRAND SPECIFIED) test strip Use to test blood sugar 3 times daily or as directed. Any covered brand that works with meter. 300 strip 1     blood glucose monitoring (ACCU-CHEK FASTCLIX) lancets USE TO TEST 3 TIMES DAILY OR AS DIRECTED. 300 each 1     blood glucose monitoring (SOFTCLIX) lancets Use to test blood sugar 1-2 times daily. 250 each 5     blood glucose monitoring (SOFTCLIX) lancets Use to test blood sugar 3 times daily. 300 each 6      Blood Glucose Monitoring Suppl (ACCU-CHEK GUIDE) w/Device KIT USE TO TEST BLOOD SUGAR THREE TIMES DAILY AS DIRECTED.       cyanocobalamin (VITAMIN B-12) 500 MCG tablet Take 500 mcg by mouth daily       empagliflozin (JARDIANCE) 10 MG TABS tablet Take 1 tablet (10 mg) by mouth daily 90 tablet 2     loratadine (CLARITIN) 10 MG tablet Take 10 mg by mouth every evening Patient takes at bedtime       magnesium oxide (MAG-OX) 400 MG tablet Take 1 tablet (400 mg) by mouth 2 times daily. 180 tablet 3     Multiple Vitamins-Minerals (HAIR SKIN NAILS PO) Take by mouth every morning       mycophenolate (GENERIC EQUIVALENT) 250 MG capsule Take 2 capsules (500 mg) by mouth 2 times daily. (Patient not taking: Reported on 12/13/2024) 120 capsule 11     mycophenolate (GENERIC EQUIVALENT) 250 MG capsule Take 2 capsules (500 mg) by mouth 2 times daily 120 capsule 11     OMEPRAZOLE PO Take 20 mg by mouth every morning       sulfamethoxazole-trimethoprim (BACTRIM) 400-80 MG tablet Take 1 tablet by mouth every morning 90 tablet 3     tacrolimus (GENERIC EQUIVALENT) 0.5 MG capsule Take 1 capsule (0.5 mg) by mouth 2 times daily 180 capsule 3     tacrolimus (GENERIC EQUIVALENT) 1 MG capsule ON HOLD due to dose change (Patient not taking: Reported on 12/13/2024) 90 capsule 3     thin (NO BRAND SPECIFIED) lancets Use with lanceting device 3x daily. Any covered brand that works with lancing device. 300 each 1       Family History  family history includes C.A.D. in his father; Cerebrovascular Disease in his father; Diabetes in his father; Hypertension in his father.    Social History   reports that he quit smoking about 30 years ago. His smoking use included cigarettes. He started smoking about 41 years ago. He has a 20 pack-year smoking history. He has never used smokeless tobacco. He reports current alcohol use. He reports that he does not use drugs.     Past Medical History  Past Medical History:   Diagnosis Date     (HFpEF) heart  failure with preserved ejection fraction (H)      Allergic rhinitis, cause unspecified      Anemia of chronic kidney failure      Aortic stenosis 08/13/2008    Overview:  Bicuspid aortic valve     AS (aortic stenosis)      Ascending aortic aneurysm      Bicuspid aortic valve      Bilateral hand pain 06/01/2021     CAD (coronary artery disease)      Congestive heart failure, unspecified      Coronary artery disease involving native artery of transplanted heart without angina pectoris 07/10/2014     Dialysis patient     Tues-Thur-Sat     Dyslipidemia      Esophageal reflux      ESRD (end stage renal disease) (H)      Hearing problem      Heart replaced by transplant (H) 12/10/2018     Hypersomnia with sleep apnea, unspecified      Hypertension      Ileostomy status (H)      Immunosuppression      MGUS (monoclonal gammopathy of unknown significance)      Mitral regurgitation      SHEELA (obstructive sleep apnea)     No CPAP     Pneumonia      Sigmoid diverticulitis 08/14/2018     Status post coronary angiogram 06/28/2019     Systolic heart failure (H)      Type 2 diabetes mellitus (H)      Ventral hernia without obstruction or gangrene        Past Surgical History:   Procedure Laterality Date     BIOPSY       CARDIAC SURGERY       COLONOSCOPY N/A 5/3/2018    Procedure: COLONOSCOPY;  colonoscopy ;  Surgeon: Ammon Castillo MD;  [facility]: [facility]     COLONOSCOPY N/A 5/26/2023    Procedure: COLONOSCOPY, WITH POLYPECTOMY via bx forceps;  Surgeon: Francisco Bland MD;  [facility]     CREATE FISTULA ARTERIOVENOUS UPPER EXTREMITY BOVINE Left 5/8/2019    Procedure: Left Upper Extremity Arteriovenous Bovine Graft Creation;  Surgeon: Calin Cheney MD;  [facility]     CV CORONARY ANGIOGRAM N/A 6/28/2019    Procedure: CV CORONARY ANGIOGRAM;  Surgeon: Montrell Posada MD;  Location:  HEART CARDIAC CATH LAB     CV CORONARY ANGIOGRAM N/A 7/15/2020    Procedure: CV CORONARY ANGIOGRAM;  Surgeon: Marcelo Ramírez  MD;  [facility]     CV CORONARY ANGIOGRAM N/A 6/7/2021    Procedure: CV CORONARY ANGIOGRAM;  Surgeon: Gilberto Mccann MD;  [facility]     CV CORONARY ANGIOGRAM N/A 6/13/2022    Procedure: Coronary Angiogram;  Surgeon: Marcelo Ramírez MD;  [facility]     CV CORONARY ANGIOGRAM N/A 7/15/2024    Procedure: Coronary Angiogram;  Surgeon: Jordan Fox MD;  [facility]     CV HEART BIOPSY N/A 2/1/2019    Procedure: HBX;  Surgeon: Montrell Posada MD;  [facility]     CV HEART BIOPSY N/A 3/22/2019    Procedure: HBX, RIJV ACCESS;  Surgeon: Jordan Fox MD;  [facility]     CV HEART BIOPSY N/A 6/28/2019    Procedure: CV HEART BIOPSY;  Surgeon: Montrell Posada MD;  [facility]     CV HEART BIOPSY N/A 10/28/2019    Procedure: CV HEART BIOPSY;  Surgeon: Marcelo Ramírez MD;  [facility]     CV HEART BIOPSY N/A 2/6/2020    Procedure: CV HEART BIOPSY;  Surgeon: Montrell Posada MD;  [facility]     CV HEART BIOPSY N/A 7/15/2020    Procedure: CV HEART BIOPSY;  Surgeon: Marcelo Ramírez MD;  [facility]     CV RIGHT HEART CATH MEASUREMENTS RECORDED N/A 6/28/2019    Procedure: CV RIGHT HEART CATH;  Surgeon: Montrell Posada MD;  [facility]     CV RIGHT HEART CATH MEASUREMENTS RECORDED N/A 7/15/2020    Procedure: CV RIGHT HEART CATH;  Surgeon: Marcelo Ramírez MD;  [facility]     CV RIGHT HEART CATH MEASUREMENTS RECORDED N/A 6/7/2021    Procedure: CV RIGHT HEART CATH;  Surgeon: Gilberto Mccann MD;  [facility]     CV RIGHT HEART CATH MEASUREMENTS RECORDED N/A 6/13/2022    Procedure: Right Heart Catheterization;  Surgeon: Marcelo Ramírez MD;  Location: [facility]  CARDIAC CATH LAB     CV RIGHT HEART CATH MEASUREMENTS RECORDED N/A 7/15/2024    Procedure: Right Heart Catheterization;  Surgeon: Jordan Fox MD;  [facility]     ENDOSCOPIC ULTRASOUND UPPER GASTROINTESTINAL TRACT (GI) N/A 11/8/2021    Procedure: ENDOSCOPIC ULTRASOUND, ESOPHAGOSCOPY / UPPER GASTROINTESTINAL TRACT (GI)  EUS with FNA;  Surgeon: Alon Don MD;  [facility]     ESOPHAGOSCOPY, GASTROSCOPY, DUODENOSCOPY (EGD), COMBINED N/A 5/7/2018    Procedure: COMBINED ENDOSCOPIC ULTRASOUND, ESOPHAGOSCOPY, GASTROSCOPY, DUODENOSCOPY (EGD), FINE NEEDLE ASPIRATE/BIOPSY;  Endoscopic Ultrasound with Fine Needle Aspiration ;  Surgeon: Alon Don MD;  [facility]     EXAM UNDER ANESTHESIA RECTUM N/A 8/12/2018    Procedure: EXAM UNDER ANESTHESIA RECTUM;  EXAM UNDER ANESTHESIA RECTUM ,COMBINED INCISION AND DRAINAGE OF RECTAL ABCESS ;  Surgeon: Rick Tran MD;  [facility]     HERNIORRHAPHY VENTRAL N/A 6/24/2022    Procedure: open mesh repair, incisional ventral hernia;  Surgeon: Shaheed Curtis MD;  [facility]     INCISION AND DRAINAGE RECTUM, COMBINED N/A 8/12/2018    Procedure: COMBINED INCISION AND DRAINAGE RECTUM;;  Surgeon: Rick Tran MD;  [facility]     IR DIALYSIS FISTULOGRAM LEFT  9/25/2019     IR DIALYSIS FISTULOGRAM LEFT  11/22/2019     IR DIALYSIS PTA  9/25/2019     IR DIALYSIS PTA  11/22/2019     LAPAROSCOPIC ASSISTED COLOSTOMY TAKEDOWN N/A 12/11/2018    Procedure: Laparoscopic Assisted Colostomy Takedown, Laparoscopic Lysis of Adhesions;  Surgeon: Rick Tran MD;  [facility]     LAPAROSCOPIC ASSISTED SIGMOID COLECTOMY N/A 8/14/2018    Procedure: LAPAROSCOPIC ASSISTED SIGMOID COLECTOMY;  Laparoscopic Hand Assisted Takedown of Splenic Flexure, Sigmoidectomy, Small Bowel Resection, Takedown of Small Bowel to Colon Fistula;  Surgeon: Rick Tran MD;  [facility]      LAPAROSCOPIC HERNIORRHAPHY INGUINAL BILATERAL Bilateral 7/24/2015    Procedure: LAPAROSCOPIC HERNIORRHAPHY INGUINAL BILATERAL;  Surgeon: Bobby Mcconnell MD;  [facility]     LAPAROSCOPIC INSERTION CATHETER PERITONEAL DIALYSIS N/A 6/22/2017    Procedure: LAPAROSCOPIC INSERTION CATHETER PERITONEAL DIALYSIS;  Laparoscopic Peritoneal Dialysis Catheter Placement - Anesthesia with block;  Surgeon: Esteban Arvizu MD;  [facility]     PICC INSERTION Left 04/22/2018    5Fr - 49cm (3cm external), Basilic vein, low SVC     REMOVE CATHETER PERITONEAL Right 1/15/2018    Procedure: REMOVE CATHETER PERITONEAL;  Open Removal of Peritoneal Dialysis Catheter ;  Surgeon: Esteban Arvizu MD;  [facility]     SIGMOIDOSCOPY FLEXIBLE N/A 11/21/2018    Procedure: Examination Under Anesthesia, Flexible Sigmoidoscopy and Polypectomy;  Surgeon: Rick Tran MD;  [facility]     SIGMOIDOSCOPY FLEXIBLE N/A 12/11/2018    Procedure: Flexible Sigmoidoscopy;  Surgeon: Rick Tran MD;  [facility]     TAKEDOWN ILEOSTOMY N/A 3/27/2019    Procedure: Takedown Ileostomy;  Surgeon: Rick Tran MD;  [facility]     TRANSPLANT HEART RECIPIENT N/A 6/14/2018    Procedure: TRANSPLANT HEART RECIPIENT;  Median Sternotomy, on-pump oxygenator, Heart Transplant;  Surgeon: Rony Caputo MD;  [facility]     TRANSPLANT KIDNEY RECIPIENT LIVING UNRELATED  12/2019       Physical Exam    No exam today.    RESULTS  Creatinine   Date Value Ref Range Status   11/18/2024 0.95 0.67 - 1.17 mg/dL Final   07/06/2021 1.09 0.66 - 1.25 mg/dL Final     GFR Estimate   Date Value Ref Range Status   11/18/2024 87 >60 mL/min/1.73m2 Final     Comment:     eGFR calculated using 2021 CKD-EPI equation.   07/06/2021 70 >60 mL/min/[1.73_m2] Final     Comment:     Non  GFR Calc  Starting 12/18/2018, serum creatinine based estimated GFR (eGFR) will be   calculated using the Chronic Kidney  Disease Epidemiology Collaboration   (CKD-EPI) equation.       Hemoglobin A1C   Date Value Ref Range Status   06/12/2024 6.5 (H) <5.7 % Final     Comment:     Normal <5.7%   Prediabetes 5.7-6.4%    Diabetes 6.5% or higher     Note: Adopted from ADA consensus guidelines.   06/07/2021 6.2 (H) 0 - 5.6 % Final     Comment:     Normal <5.7% Prediabetes 5.7-6.4%  Diabetes 6.5% or higher - adopted from ADA   consensus guidelines.       Potassium   Date Value Ref Range Status   11/18/2024 4.1 3.4 - 5.3 mmol/L Final   08/09/2022 3.7 3.4 - 5.3 mmol/L Final   07/06/2021 3.9 3.4 - 5.3 mmol/L Final     ALT   Date Value Ref Range Status   06/12/2024 17 0 - 70 U/L Final     Comment:     Reference intervals for this test were updated on 6/12/2023 to more accurately reflect our healthy population. There may be differences in the flagging of prior results with similar values performed with this method. Interpretation of those prior results can be made in the context of the updated reference intervals.     06/07/2021 28 0 - 70 U/L Final     AST   Date Value Ref Range Status   06/12/2024 24 0 - 45 U/L Final     Comment:     Reference intervals for this test were updated on 6/12/2023 to more accurately reflect our healthy population. There may be differences in the flagging of prior results with similar values performed with this method. Interpretation of those prior results can be made in the context of the updated reference intervals.   06/07/2021 27 0 - 45 U/L Final     TSH   Date Value Ref Range Status   09/07/2021 1.89 0.40 - 4.00 mU/L Final   04/16/2018 2.25 0.40 - 4.00 mU/L Final       Cholesterol   Date Value Ref Range Status   06/12/2024 148 <200 mg/dL Final   01/29/2024 147 <200 mg/dL Final   06/07/2021 120 <200 mg/dL Final   12/09/2020 116 <200 mg/dL Final     HDL Cholesterol   Date Value Ref Range Status   06/07/2021 49 >39 mg/dL Final   12/09/2020 47 >39 mg/dL Final     Direct Measure HDL   Date Value Ref Range Status    06/12/2024 51 >=40 mg/dL Final   01/29/2024 47 >=40 mg/dL Final     LDL Cholesterol Calculated   Date Value Ref Range Status   06/12/2024 62 <=100 mg/dL Final   01/29/2024 50 <=100 mg/dL Final   06/07/2021 28 <100 mg/dL Final     Comment:     Desirable:       <100 mg/dl   12/09/2020 26 <100 mg/dL Final     Comment:     Desirable:       <100 mg/dl     Triglycerides   Date Value Ref Range Status   06/12/2024 176 (H) <150 mg/dL Final   01/29/2024 248 (H) <150 mg/dL Final   06/07/2021 215 (H) <150 mg/dL Final     Comment:     Borderline high:  150-199 mg/dl  High:             200-499 mg/dl  Very high:       >499 mg/dl     12/09/2020 214 (H) <150 mg/dL Final     Comment:     Borderline high:  150-199 mg/dl  High:             200-499 mg/dl  Very high:       >499 mg/dl       Cholesterol/HDL Ratio   Date Value Ref Range Status   08/10/2015 5.0 0.0 - 5.0 Final   04/09/2015 4.0 0.0 - 5.0 Final         ASSESSMENT/PLAN:    1.  TYPE 2 DIABETES MELLITUS: Patient's glycemic control has improved with addition of Jardiance.  Continue Jardiance 10 mg each am and Metformin 500 mg 2 tablets each am and 2 tablets each pm.  Again, I reviewed the possible side effects of Jardiance including dehydration, UTI and groin yeast infection.  Also reviewed the cardiovascular and renal benefits of taking a SGLT-2 drug.  Reminded Murray to drink plenty of water.  Most recent creat 0.95 with GFR 87 mL/min in November 2024.  Pt continues to eat healthy and exercising.  I asked pt to check his FBS each am and 2 hr postmeal blood sugar.  Pt seen by Oph without retinopathy.  No sx of diabetic peripheral neuropathy and he denies foot ulcers.  No vitals today.    2.  HX OF ESRD: S/P kidney transplant in Dec 2019.  Most recent creat 0.95 with GFR 87 mL/min in November 2024.    3.  CARDIAC: S/P heart transplant in 6/2018.    4.  LIPIDS: LDL 62 in June 2024. Pt taking Lipitor.    5.  FOLLOW UP: With me in 6 months.    A1C and creat/GFR ordered  today.  Consider graduating from Endocrine Clinic next visit. Murray will also discuss this with his primary care provider    Time spent reviewing chart, labs and glucose data today =5  minutes.  Time for video visit today = 15 minutes.  Time for documentation today = 10 minutes.    Total time for visit today = 30 minutes.    Rosa Calvert PA-C              Again, thank you for allowing me to participate in the care of your patient.      Sincerely,    Rosa Calvert PA-C

## 2025-01-10 NOTE — NURSING NOTE
Current patient location: [address] [place] [address] [address]  [place] [address]    Is the patient currently in the state of [place]? YES    Visit mode: VIDEO    If the visit is dropped, the patient can be reconnected by:VIDEO VISIT: Text to cell phone:   Telephone Information:   Mobile [phone]    and VIDEO VISIT: Send to e-mail at: [email or web address]    Will anyone else be joining the visit? NO  (If patient encounters technical issues they should call [phone])    Are changes needed to the allergy or medication list? Pt stated no med changes    Are refills needed on medications prescribed by this physician? NO    Rooming Documentation:  Questionnaire(s) completed    Reason for visit: RECHECK (6 mo follow-up )    Laurence PRICE

## 2025-01-11 NOTE — PATIENT INSTRUCTIONS
Dear Murray Nicholson    Thank you for choosing [facility] ([facility]) for your transplant cares.  The following information is a summary of our appointment as well as important reminders.      Please make sure your phone is available today because I will call to update you with your anti-rejection drug levels and possibly make changes to your anti-rejection dosages.    Please  the following prescriptions from 1st floor pharmacy:  Accu-check lancets  Accu-check test strips  Lantus Insulin pen  Valtrex    I will call you later and let you know the plan for follow-up. Depending on your Tacrolimus level we will have you return tomorrow or Thursday for labs.     Continue to push fluids today. Goal of 2-3 liters.    We look forward in seeing you on your next appointment here at [facility] ([facility]).  Please don t hesitate to call us at [phone] to reschedule any of your appointments or to speak with one of the [facility] registered nurses.  It was a pleasure taking care of you today.    Sincerely,    [facility]  [address] [place] [address]  [id number]  Phone:  [phone]  
Wheelchair/Stroller

## 2025-01-11 NOTE — PROGRESS NOTES
Time of start: 1:40 PM  Time of end: 1:55 PM   Providers location: Off-site.  Patient's location: [place].    HPI  Murray Nicholson is a 70 year old male with type 2 diabetes mellitus. Video visit for diabetes follow up today.  Last visit with me was in May 2024.  Patient continues to do well.  Murray gives a hx of type 2 diabetes > 20 years complicated by ESRD- s/p kidney transplant in Dec 2019.   Pt denies known hx of retinopathy or sx of neuropathy.  He has hx CAD- s/p heart transplant in June 2018.  Pt also has hx of HTN, hyperlipidemia, ascending aortic aneurysm and GERD.  For his diabetes, he is currently taking Jardiance 10 mg each am and Metformin 500 mg 4 tabs in am.  Most recent A1C was 6.5 % on 6/12/2024.  He provided me with a few blood sugar readings today which I scanned in his note below.  Most of these blood sugar values are fasting and good values.  On ROS today, he continues to work on making healthy food choices and remains active.    Denies blurred vision, n/v, SOB at rest, cough, fever, chest pain or abd pain.  No diarrhea, dysuria or hematuria.  He denies sx of neuropathy or foot ulcers.  No sx of groin yeast infection.    Diabetes Care  Retinopathy: none per patient; pt seen by Oph in Fall 2024 without retinopathy.  Nephropathy:hx of ESRD s/p kidney transplant in 12/2019. Urine microalbuminuria negative in July 2024.  Neuropathy: none per patient.  Foot Exam: no exam today.  Taking aspirin: yes.  Lipids: LDL 62 in June 2024. Pt taking Lipitor.  Insulin: none.  DM meds: Metformin and Jardiance.  Testing: glucose meter.            ROS  See under HPI.      Allergies  Allergies   Allergen Reactions    Cats Shortness Of Breath and Anaphylaxis    Penicillins Hives    Shrimp Shortness Of Breath and Anaphylaxis    Isosorbide Nitrate      hypotension    Norco [Hydrocodone-Acetaminophen] Nausea and Vomiting       Medications  Current Outpatient Medications   Medication Sig Dispense Refill    metFORMIN  (GLUCOPHAGE XR) 500 MG 24 hr tablet TAKE 4 TABLETS BY MOUTH EVERY MORNING. Please do NOT fill today 1/10/2025. 360 tablet 3    alcohol swab prep pads Use to swab area of injection/davida as directed. 300 each 6    amLODIPine (NORVASC) 5 MG tablet Take 1 tablet (5 mg) by mouth daily 90 tablet 3    aspirin 81 MG EC tablet Take 81 mg by mouth every evening      atorvastatin (LIPITOR) 40 MG tablet Take 1 tablet (40 mg) by mouth every evening. 30 tablet 11    Biotin 10 MG CAPS Take 10 mg by mouth daily      blood glucose (ACCU-CHEK FASTCLIX) lancing device USE WITH LANCETS 1 kit 0    blood glucose (ACCU-CHEK GUIDE) test strip Use to test blood sugar 3 times daily or as directed. 300 strip 1    blood glucose (NO BRAND SPECIFIED) test strip Use to test blood sugar 3 times daily or as directed. Any covered brand that works with meter. 300 strip 1    blood glucose monitoring (ACCU-CHEK FASTCLIX) lancets USE TO TEST 3 TIMES DAILY OR AS DIRECTED. 300 each 1    blood glucose monitoring (SOFTCLIX) lancets Use to test blood sugar 1-2 times daily. 250 each 5    blood glucose monitoring (SOFTCLIX) lancets Use to test blood sugar 3 times daily. 300 each 6    Blood Glucose Monitoring Suppl (ACCU-CHEK GUIDE) w/Device KIT USE TO TEST BLOOD SUGAR THREE TIMES DAILY AS DIRECTED.      cyanocobalamin (VITAMIN B-12) 500 MCG tablet Take 500 mcg by mouth daily      empagliflozin (JARDIANCE) 10 MG TABS tablet Take 1 tablet (10 mg) by mouth daily 90 tablet 2    loratadine (CLARITIN) 10 MG tablet Take 10 mg by mouth every evening Patient takes at bedtime      magnesium oxide (MAG-OX) 400 MG tablet Take 1 tablet (400 mg) by mouth 2 times daily. 180 tablet 3    Multiple Vitamins-Minerals (HAIR SKIN NAILS PO) Take by mouth every morning      mycophenolate (GENERIC EQUIVALENT) 250 MG capsule Take 2 capsules (500 mg) by mouth 2 times daily. (Patient not taking: Reported on 12/13/2024) 120 capsule 11    mycophenolate (GENERIC EQUIVALENT) 250 MG capsule  Take 2 capsules (500 mg) by mouth 2 times daily 120 capsule 11    OMEPRAZOLE PO Take 20 mg by mouth every morning      sulfamethoxazole-trimethoprim (BACTRIM) 400-80 MG tablet Take 1 tablet by mouth every morning 90 tablet 3    tacrolimus (GENERIC EQUIVALENT) 0.5 MG capsule Take 1 capsule (0.5 mg) by mouth 2 times daily 180 capsule 3    tacrolimus (GENERIC EQUIVALENT) 1 MG capsule ON HOLD due to dose change (Patient not taking: Reported on 12/13/2024) 90 capsule 3    thin (NO BRAND SPECIFIED) lancets Use with lanceting device 3x daily. Any covered brand that works with lancing device. 300 each 1       Family History  family history includes C.A.D. in his father; Cerebrovascular Disease in his father; Diabetes in his father; Hypertension in his father.    Social History   reports that he quit smoking about 30 years ago. His smoking use included cigarettes. He started smoking about 41 years ago. He has a 20 pack-year smoking history. He has never used smokeless tobacco. He reports current alcohol use. He reports that he does not use drugs.     Past Medical History  Past Medical History:   Diagnosis Date    (HFpEF) heart failure with preserved ejection fraction (H)     Allergic rhinitis, cause unspecified     Anemia of chronic kidney failure     Aortic stenosis 08/13/2008    Overview:  Bicuspid aortic valve    AS (aortic stenosis)     Ascending aortic aneurysm     Bicuspid aortic valve     Bilateral hand pain 06/01/2021    CAD (coronary artery disease)     Congestive heart failure, unspecified     Coronary artery disease involving native artery of transplanted heart without angina pectoris 07/10/2014    Dialysis patient     Tues-Thur-Sat    Dyslipidemia     Esophageal reflux     ESRD (end stage renal disease) (H)     Hearing problem     Heart replaced by transplant (H) 12/10/2018    Hypersomnia with sleep apnea, unspecified     Hypertension     Ileostomy status (H)     Immunosuppression     MGUS (monoclonal gammopathy  of unknown significance)     Mitral regurgitation     SHEELA (obstructive sleep apnea)     No CPAP    Pneumonia     Sigmoid diverticulitis 08/14/2018    Status post coronary angiogram 06/28/2019    Systolic heart failure (H)     Type 2 diabetes mellitus (H)     Ventral hernia without obstruction or gangrene        Past Surgical History:   Procedure Laterality Date    BIOPSY      CARDIAC SURGERY      COLONOSCOPY N/A 5/3/2018    Procedure: COLONOSCOPY;  colonoscopy ;  Surgeon: Ammon Castillo MD;  Location:  GI    COLONOSCOPY N/A 5/26/2023    Procedure: COLONOSCOPY, WITH POLYPECTOMY via bx forceps;  Surgeon: Francisco Bland MD;  Location:  GI    CREATE FISTULA ARTERIOVENOUS UPPER EXTREMITY BOVINE Left 5/8/2019    Procedure: Left Upper Extremity Arteriovenous Bovine Graft Creation;  Surgeon: Calin Cheney MD;  [facility]    CV CORONARY ANGIOGRAM N/A 6/28/2019    Procedure: CV CORONARY ANGIOGRAM;  Surgeon: Montrell Posada MD;  Location:  HEART CARDIAC CATH LAB    CV CORONARY ANGIOGRAM N/A 7/15/2020    Procedure: CV CORONARY ANGIOGRAM;  Surgeon: Marcelo Ramírez MD;  Location:  HEART CARDIAC CATH LAB    CV CORONARY ANGIOGRAM N/A 6/7/2021    Procedure: CV CORONARY ANGIOGRAM;  Surgeon: Gilberto Mccann MD;  Location:  HEART CARDIAC CATH LAB    CV CORONARY ANGIOGRAM N/A 6/13/2022    Procedure: Coronary Angiogram;  Surgeon: Marcelo Ramírez MD;  Location:  HEART CARDIAC CATH LAB    CV CORONARY ANGIOGRAM N/A 7/15/2024    Procedure: Coronary Angiogram;  Surgeon: Jordan Fox MD;  Location:  HEART CARDIAC CATH LAB    CV HEART BIOPSY N/A 2/1/2019    Procedure: HBX;  Surgeon: Montrell Posada MD;  Location:  HEART CARDIAC CATH LAB    CV HEART BIOPSY N/A 3/22/2019    Procedure: HBX, RIJV ACCESS;  Surgeon: Jordan Fox MD;  Location:  HEART CARDIAC CATH LAB    CV HEART BIOPSY N/A 6/28/2019    Procedure: CV HEART BIOPSY;  Surgeon: Montrell Posada MD;  Location: [facility]  CARDIAC CATH LAB    CV HEART BIOPSY N/A 10/28/2019    Procedure: CV HEART BIOPSY;  Surgeon: Marcelo Ramírez MD;  Location:  HEART CARDIAC CATH LAB    CV HEART BIOPSY N/A 2/6/2020    Procedure: CV HEART BIOPSY;  Surgeon: Montrell Posada MD;  Location:  HEART CARDIAC CATH LAB    CV HEART BIOPSY N/A 7/15/2020    Procedure: CV HEART BIOPSY;  Surgeon: Marcelo Ramírez MD;  [facility]    CV RIGHT HEART CATH MEASUREMENTS RECORDED N/A 6/28/2019    Procedure: CV RIGHT HEART CATH;  Surgeon: Montrell Posada MD;  Location:  HEART CARDIAC CATH LAB    CV RIGHT HEART CATH MEASUREMENTS RECORDED N/A 7/15/2020    Procedure: CV RIGHT HEART CATH;  Surgeon: Marcelo Ramírez MD;  Location:  HEART CARDIAC CATH LAB    CV RIGHT HEART CATH MEASUREMENTS RECORDED N/A 6/7/2021    Procedure: CV RIGHT HEART CATH;  Surgeon: Gilberto Mccann MD;  Location:  HEART CARDIAC CATH LAB    CV RIGHT HEART CATH MEASUREMENTS RECORDED N/A 6/13/2022    Procedure: Right Heart Catheterization;  Surgeon: Marcelo Ramírez MD;  Location:  HEART CARDIAC CATH LAB    CV RIGHT HEART CATH MEASUREMENTS RECORDED N/A 7/15/2024    Procedure: Right Heart Catheterization;  Surgeon: Jordan Fox MD;  Location:  HEART CARDIAC CATH LAB    ENDOSCOPIC ULTRASOUND UPPER GASTROINTESTINAL TRACT (GI) N/A 11/8/2021    Procedure: ENDOSCOPIC ULTRASOUND, ESOPHAGOSCOPY / UPPER GASTROINTESTINAL TRACT (GI)  EUS with FNA;  Surgeon: Alon Don MD;  Location:  OR    ESOPHAGOSCOPY, GASTROSCOPY, DUODENOSCOPY (EGD), COMBINED N/A 5/7/2018    Procedure: COMBINED ENDOSCOPIC ULTRASOUND, ESOPHAGOSCOPY, GASTROSCOPY, DUODENOSCOPY (EGD), FINE NEEDLE ASPIRATE/BIOPSY;  Endoscopic Ultrasound with Fine Needle Aspiration ;  Surgeon: Alon Don MD;  Location:  OR    EXAM UNDER ANESTHESIA RECTUM N/A 8/12/2018    Procedure: EXAM UNDER ANESTHESIA RECTUM;  EXAM UNDER ANESTHESIA RECTUM ,COMBINED INCISION AND  DRAINAGE OF RECTAL ABCESS ;  Surgeon: Rick Tran MD;  [facility]    HERNIORRHAPHY VENTRAL N/A 6/24/2022    Procedure: open mesh repair, incisional ventral hernia;  Surgeon: Shaheed Curtis MD;  [facility]    INCISION AND DRAINAGE RECTUM, COMBINED N/A 8/12/2018    Procedure: COMBINED INCISION AND DRAINAGE RECTUM;;  Surgeon: Rick Tran MD;  [facility]    IR DIALYSIS FISTULOGRAM LEFT  9/25/2019    IR DIALYSIS FISTULOGRAM LEFT  11/22/2019    IR DIALYSIS PTA  9/25/2019    IR DIALYSIS PTA  11/22/2019    LAPAROSCOPIC ASSISTED COLOSTOMY TAKEDOWN N/A 12/11/2018    Procedure: Laparoscopic Assisted Colostomy Takedown, Laparoscopic Lysis of Adhesions;  Surgeon: Rick Tran MD;  [facility]    LAPAROSCOPIC ASSISTED SIGMOID COLECTOMY N/A 8/14/2018    Procedure: LAPAROSCOPIC ASSISTED SIGMOID COLECTOMY;  Laparoscopic Hand Assisted Takedown of Splenic Flexure, Sigmoidectomy, Small Bowel Resection, Takedown of Small Bowel to Colon Fistula;  Surgeon: Rick Tran MD;  [facility]    LAPAROSCOPIC HERNIORRHAPHY INGUINAL BILATERAL Bilateral 7/24/2015    Procedure: LAPAROSCOPIC HERNIORRHAPHY INGUINAL BILATERAL;  Surgeon: Bobby Mcconnell MD;  [facility]    LAPAROSCOPIC INSERTION CATHETER PERITONEAL DIALYSIS N/A 6/22/2017    Procedure: LAPAROSCOPIC INSERTION CATHETER PERITONEAL DIALYSIS;  Laparoscopic Peritoneal Dialysis Catheter Placement - Anesthesia with block;  Surgeon: Esetban Arvizu MD;  [facility]    PICC INSERTION Left 04/22/2018    5Fr - 49cm (3cm external), Basilic vein, low SVC    REMOVE CATHETER PERITONEAL Right 1/15/2018    Procedure: REMOVE CATHETER PERITONEAL;  Open Removal of Peritoneal Dialysis Catheter ;  Surgeon: Esteban Arvizu MD;  [facility]    SIGMOIDOSCOPY FLEXIBLE N/A 11/21/2018    Procedure: Examination Under Anesthesia, Flexible Sigmoidoscopy and Polypectomy;  Surgeon: Rick Tran MD;   [facility]    SIGMOIDOSCOPY FLEXIBLE N/A 12/11/2018    Procedure: Flexible Sigmoidoscopy;  Surgeon: Rick Tran MD;  [facility]    TAKEDOWN ILEOSTOMY N/A 3/27/2019    Procedure: Takedown Ileostomy;  Surgeon: Rick Tran MD;  [facility]    TRANSPLANT HEART RECIPIENT N/A 6/14/2018    Procedure: TRANSPLANT HEART RECIPIENT;  Median Sternotomy, on-pump oxygenator, Heart Transplant;  Surgeon: Rony Caputo MD;  [facility]    TRANSPLANT KIDNEY RECIPIENT LIVING UNRELATED  12/2019       Physical Exam    No exam today.    RESULTS  Creatinine   Date Value Ref Range Status   11/18/2024 0.95 0.67 - 1.17 mg/dL Final   07/06/2021 1.09 0.66 - 1.25 mg/dL Final     GFR Estimate   Date Value Ref Range Status   11/18/2024 87 >60 mL/min/1.73m2 Final     Comment:     eGFR calculated using 2021 CKD-EPI equation.   07/06/2021 70 >60 mL/min/[1.73_m2] Final     Comment:     Non  GFR Calc  Starting 12/18/2018, serum creatinine based estimated GFR (eGFR) will be   calculated using the Chronic Kidney Disease Epidemiology Collaboration   (CKD-EPI) equation.       Hemoglobin A1C   Date Value Ref Range Status   06/12/2024 6.5 (H) <5.7 % Final     Comment:     Normal <5.7%   Prediabetes 5.7-6.4%    Diabetes 6.5% or higher     Note: Adopted from ADA consensus guidelines.   06/07/2021 6.2 (H) 0 - 5.6 % Final     Comment:     Normal <5.7% Prediabetes 5.7-6.4%  Diabetes 6.5% or higher - adopted from ADA   consensus guidelines.       Potassium   Date Value Ref Range Status   11/18/2024 4.1 3.4 - 5.3 mmol/L Final   08/09/2022 3.7 3.4 - 5.3 mmol/L Final   07/06/2021 3.9 3.4 - 5.3 mmol/L Final     ALT   Date Value Ref Range Status   06/12/2024 17 0 - 70 U/L Final     Comment:     Reference intervals for this test were updated on 6/12/2023 to more accurately reflect our healthy population. There may be differences in the flagging of prior results with similar values performed with  this method. Interpretation of those prior results can be made in the context of the updated reference intervals.     06/07/2021 28 0 - 70 U/L Final     AST   Date Value Ref Range Status   06/12/2024 24 0 - 45 U/L Final     Comment:     Reference intervals for this test were updated on 6/12/2023 to more accurately reflect our healthy population. There may be differences in the flagging of prior results with similar values performed with this method. Interpretation of those prior results can be made in the context of the updated reference intervals.   06/07/2021 27 0 - 45 U/L Final     TSH   Date Value Ref Range Status   09/07/2021 1.89 0.40 - 4.00 mU/L Final   04/16/2018 2.25 0.40 - 4.00 mU/L Final       Cholesterol   Date Value Ref Range Status   06/12/2024 148 <200 mg/dL Final   01/29/2024 147 <200 mg/dL Final   06/07/2021 120 <200 mg/dL Final   12/09/2020 116 <200 mg/dL Final     HDL Cholesterol   Date Value Ref Range Status   06/07/2021 49 >39 mg/dL Final   12/09/2020 47 >39 mg/dL Final     Direct Measure HDL   Date Value Ref Range Status   06/12/2024 51 >=40 mg/dL Final   01/29/2024 47 >=40 mg/dL Final     LDL Cholesterol Calculated   Date Value Ref Range Status   06/12/2024 62 <=100 mg/dL Final   01/29/2024 50 <=100 mg/dL Final   06/07/2021 28 <100 mg/dL Final     Comment:     Desirable:       <100 mg/dl   12/09/2020 26 <100 mg/dL Final     Comment:     Desirable:       <100 mg/dl     Triglycerides   Date Value Ref Range Status   06/12/2024 176 (H) <150 mg/dL Final   01/29/2024 248 (H) <150 mg/dL Final   06/07/2021 215 (H) <150 mg/dL Final     Comment:     Borderline high:  150-199 mg/dl  High:             200-499 mg/dl  Very high:       >499 mg/dl     12/09/2020 214 (H) <150 mg/dL Final     Comment:     Borderline high:  150-199 mg/dl  High:             200-499 mg/dl  Very high:       >499 mg/dl       Cholesterol/HDL Ratio   Date Value Ref Range Status   08/10/2015 5.0 0.0 - 5.0 Final   04/09/2015 4.0 0.0 -  5.0 Final         ASSESSMENT/PLAN:    1.  TYPE 2 DIABETES MELLITUS: Patient's glycemic control has improved with addition of Jardiance.  Continue Jardiance 10 mg each am and Metformin 500 mg 2 tablets each am and 2 tablets each pm.  Again, I reviewed the possible side effects of Jardiance including dehydration, UTI and groin yeast infection.  Also reviewed the cardiovascular and renal benefits of taking a SGLT-2 drug.  Reminded Murray to drink plenty of water.  Most recent creat 0.95 with GFR 87 mL/min in November 2024.  Pt continues to eat healthy and exercising.  I asked pt to check his FBS each am and 2 hr postmeal blood sugar.  Pt seen by Oph without retinopathy.  No sx of diabetic peripheral neuropathy and he denies foot ulcers.  No vitals today.    2.  HX OF ESRD: S/P kidney transplant in Dec 2019.  Most recent creat 0.95 with GFR 87 mL/min in November 2024.    3.  CARDIAC: S/P heart transplant in 6/2018.    4.  LIPIDS: LDL 62 in June 2024. Pt taking Lipitor.    5.  FOLLOW UP: With me in 6 months.    A1C and creat/GFR ordered today.  Consider graduating from Endocrine Clinic next visit. Murray will also discuss this with his primary care provider    Time spent reviewing chart, labs and glucose data today =5  minutes.  Time for video visit today = 15 minutes.  Time for documentation today = 10 minutes.    Total time for visit today = 30 minutes.    Rosa Calvert PA-C

## 2025-01-29 ENCOUNTER — TELEPHONE (OUTPATIENT)
Dept: PULMONOLOGY | Facility: CLINIC | Age: 70
End: 2025-01-29
Payer: MEDICARE

## 2025-01-29 NOTE — TELEPHONE ENCOUNTER
1/29/2025 10AM  1/29 Rescheduled Return [facility]CS[facility] w/ Dr. Murguia 3/6 to 3/27. Pt preferred to see Dr. Murguia instead of [facility]   Rheumatology, Gastroenterology, Nephrology, Infectious Disease, Pulmonology  [facility]

## 2025-01-30 ENCOUNTER — HOSPITAL ENCOUNTER (OUTPATIENT)
Dept: RESEARCH | Facility: CLINIC | Age: 70
End: 2025-01-30
Attending: INTERNAL MEDICINE
Payer: MEDICARE

## 2025-01-30 PROCEDURE — 510N000009 HC RESEARCH FACILITY, PER 15 MIN

## 2025-01-30 PROCEDURE — 510N000017 HC CRU PATIENT CARE, PER 15 MIN

## 2025-01-30 PROCEDURE — 36000 PLACE NEEDLE IN VEIN: CPT

## 2025-01-30 PROCEDURE — 999N000129 HC STATISTIC PICC/MID LINE PROC CANCELLED SUBQ WORKUP

## 2025-02-04 DIAGNOSIS — E11.29 TYPE 2 DIABETES MELLITUS WITH OTHER DIABETIC KIDNEY COMPLICATION, WITHOUT LONG-TERM CURRENT USE OF INSULIN (H): Primary | ICD-10-CM

## 2025-02-05 ENCOUNTER — VIRTUAL VISIT (OUTPATIENT)
Dept: PHARMACY | Facility: CLINIC | Age: 70
End: 2025-02-05
Attending: PHYSICIAN ASSISTANT
Payer: MEDICARE

## 2025-02-05 DIAGNOSIS — J30.9 ALLERGIC RHINITIS, UNSPECIFIED SEASONALITY, UNSPECIFIED TRIGGER: ICD-10-CM

## 2025-02-05 DIAGNOSIS — Z94.1 HEART REPLACED BY TRANSPLANT (H): ICD-10-CM

## 2025-02-05 DIAGNOSIS — I15.1 HTN, KIDNEY TRANSPLANT RELATED: ICD-10-CM

## 2025-02-05 DIAGNOSIS — E11.9 DIABETES MELLITUS, TYPE 2 (H): Primary | ICD-10-CM

## 2025-02-05 DIAGNOSIS — Z78.9 TAKES DIETARY SUPPLEMENTS: ICD-10-CM

## 2025-02-05 DIAGNOSIS — Z94.0 HTN, KIDNEY TRANSPLANT RELATED: ICD-10-CM

## 2025-02-05 DIAGNOSIS — Z94.0 KIDNEY REPLACED BY TRANSPLANT: ICD-10-CM

## 2025-02-05 DIAGNOSIS — K21.9 GASTROESOPHAGEAL REFLUX DISEASE, UNSPECIFIED WHETHER ESOPHAGITIS PRESENT: ICD-10-CM

## 2025-02-05 DIAGNOSIS — E78.5 DYSLIPIDEMIA: ICD-10-CM

## 2025-02-05 NOTE — PROGRESS NOTES
"Vb    Medication Therapy Management (MTM) Encounter    ASSESSMENT:                            Medication Adherence/Access: See below for considerations    Diabetes: MTM had extensive discussion with patient regarding Jardiance, specifically on benefits. Discussed heart and kidney protective properties as well as diabetes indication. Emphasized patient would benefit from Jardiance on multiple fronts but he would like to hear from both his cardiology and nephrology providers to hear their thoughts.  He will follow-up once he decides what he would like to do.    Hypertension: stable      Hyperlipidemia: stable      Allergy: stable      GERD: stable     Heart/kidney Transplant: stable     Supplements: stable     PLAN:                            Follow-up with your cardiology and nephrology providers about Jardiance  Let me know what you decide in terms of continuing the medication or not  You can fill out an \"Amendment Request\" to see about updating your medical conditions in your record: you can go online to download the form at [email or web address]    Follow-up: as needed w/ Mariaelena    SUBJECTIVE/OBJECTIVE:                          Murray Nicholson is a 70 year old male seen via [employer] video for an initial visit. He was referred to me from Rosa Calvert.      Reason for visit: .    Allergies/ADRs: Reviewed in chart  Past Medical History: Reviewed in chart  Tobacco: He reports that he quit smoking about 30 years ago. His smoking use included cigarettes. He started smoking about 41 years ago. He has a 20 pack-year smoking history. He has never used smokeless tobacco.  Alcohol: rare    Medication Adherence/Access: Jardiance is very expensive and patient does not want to take if it is not necessary for him    Diabetes   Patient diagnosed with Type 2 diabetes    Current Medications:  Metformin 2000 mg daily    Jardiance 10 mg daily - started a year ago, but unsure why he needs to be on " "this      Patient is not experiencing side effects.  Current diabetes symptoms: none  Blood sugar monitorin time(s) daily; Ranges: (patient reported) Fasting-   This week: 122, 137, 139, 148 (typically 120-130s)    Lab Results   Component Value Date    A1C 6.5 2024    A1C 6.7 2024    A1C 7.0 2023    A1C 7.3 2023    A1C 6.9 2023    A1C 6.2 2021    A1C 6.1 2020    A1C 6.3 07/15/2020    A1C 6.1 2020    A1C 4.5 2019       Estimated body mass index is 26.15 kg/m  as calculated from the following:    Height as of 7/15/24: 5' 8\" (1.727 m).    Weight as of 24: 172 lb (78 kg).    Wt Readings from Last 3 Encounters:   24 172 lb (78 kg)   24 175 lb (79.4 kg)   24 171 lb 3.2 oz (77.7 kg)       Lab Results   Component Value Date    UMALCR  2024      Comment:      Unable to calculate, urine albumin and/or urine creatinine is outside detectable limits.  Microalbuminuria is defined as an albumin:creatinine ratio of 17 to 299 for males and 25 to 299 for females. A ratio of albumin:creatinine of 300 or higher is indicative of overt proteinuria.  Due to biologic variability, positive results should be confirmed by a second, first-morning random or 24-hour timed urine specimen. If there is discrepancy, a third specimen is recommended. When 2 out of 3 results are in the microalbuminuria range, this is evidence for incipient nephropathy and warrants increased efforts at glucose control, blood pressure control, and institution of therapy with an angiotensin-converting-enzyme (ACE) inhibitor (if the patient can tolerate it).         Lab Results   Component Value Date    GFRESTIMATED 87 2024    GFRESTIMATED 80 2024    GFRESTIMATED 63 07/15/2024          Hypertension   Amlodipine 5 mg daily   Patient reports no current medication side effects  Patient does not self-monitor blood pressure.       Hyperlipidemia   atorvastatin 40 mg " daily  Patient reports no significant myalgias or other side effects.       Allergy   loratadine 10 mg once daily  Patient reports no current medication side effects.       Patient feels that current therapy is effective.      GERD    Omeprazole 20 mg once daily   Patient feels that current regimen is effective.       Heart/kidney Transplant  Tacrolimus 0.5 mg twice a day  Mycophenolate 500 mg twice a day   Bactrim 400/80 daily     Supplements   Biotin 10 mg daily  Vitamin B12 500 mcg daily  Magnesium oxide 400 mg twice a day  Multivitamin daily (hair/skin/nails supplement specifically)  No reported issues at this time.        Today's Vitals: There were no vitals taken for this visit.  ----------------      I spent 40 minutes with this patient today. All changes were made via collaborative practice agreement with Rosa Calvert.     A summary of these recommendations was sent via [employer].    Mariaelena Robbins, PharmD  Diabetes & Endocrine MTM Pharmacist     Telemedicine Visit Details  The patient's medications can be safely assessed via a telemedicine encounter.  Type of service:  Video Conference via [employer]  Originating Location (pt. Location): Home    Distant Location (provider location):  Off-site  Start Time:  1PM  End Time:  1:40 PM     Medication Therapy Recommendations  No medication therapy recommendations to display

## 2025-02-11 NOTE — PATIENT INSTRUCTIONS
"Recommendations from today's MTM visit:                                                      Follow-up with your cardiology and nephrology providers about Jardiance  Let me know what you decide in terms of continuing the medication or not  You can fill out an \"Amendment Request\" to see about updating your medical conditions in your record: you can go online to download the form at [email or web address]    Follow-up: as needed w/ Mariaelena    It was great speaking with you today.  I value your experience and would be very thankful for your time in providing feedback in our clinic survey. In the next few days, you may receive an email or text message from [employer] with a link to a survey related to your  clinical pharmacist.\"     To schedule another MTM appointment, please call the clinic directly or you may call the MTM scheduling line at [phone].    My Clinical Pharmacist's contact information:                                                      Please feel free to contact me with any questions or concerns you have.      Mariaelena Robbins, PharmD  Diabetes & Endocrine MTM Pharmacist    "

## 2025-02-17 ENCOUNTER — OFFICE VISIT (OUTPATIENT)
Dept: AUDIOLOGY | Facility: CLINIC | Age: 70
End: 2025-02-17
Payer: MEDICARE

## 2025-02-17 ENCOUNTER — LAB (OUTPATIENT)
Dept: LAB | Facility: CLINIC | Age: 70
End: 2025-02-17
Payer: MEDICARE

## 2025-02-17 DIAGNOSIS — E11.29 TYPE 2 DIABETES MELLITUS WITH OTHER DIABETIC KIDNEY COMPLICATION, WITHOUT LONG-TERM CURRENT USE OF INSULIN (H): ICD-10-CM

## 2025-02-17 DIAGNOSIS — Z94.0 KIDNEY TRANSPLANTED: ICD-10-CM

## 2025-02-17 DIAGNOSIS — Z79.899 ENCOUNTER FOR LONG-TERM CURRENT USE OF MEDICATION: ICD-10-CM

## 2025-02-17 DIAGNOSIS — Z48.298 AFTERCARE FOLLOWING ORGAN TRANSPLANT: ICD-10-CM

## 2025-02-17 DIAGNOSIS — H90.3 ASYMMETRIC SNHL (SENSORINEURAL HEARING LOSS): Primary | ICD-10-CM

## 2025-02-17 DIAGNOSIS — Z98.890 OTHER SPECIFIED POSTPROCEDURAL STATES: ICD-10-CM

## 2025-02-17 DIAGNOSIS — D47.2 MGUS (MONOCLONAL GAMMOPATHY OF UNKNOWN SIGNIFICANCE): ICD-10-CM

## 2025-02-17 DIAGNOSIS — Z94.0 KIDNEY REPLACED BY TRANSPLANT: ICD-10-CM

## 2025-02-17 LAB
ANION GAP SERPL CALCULATED.3IONS-SCNC: 7 MMOL/L (ref 7–15)
BUN SERPL-MCNC: 14 MG/DL (ref 8–23)
CALCIUM SERPL-MCNC: 9.5 MG/DL (ref 8.8–10.4)
CD3 CELLS # BLD: 1152 CELLS/UL (ref 603–2990)
CD3 CELLS NFR BLD: 78 % (ref 49–84)
CD3+CD4+ CELLS # BLD: 331 CELLS/UL (ref 441–2156)
CD3+CD4+ CELLS NFR BLD: 22 % (ref 28–63)
CD3+CD4+ CELLS/CD3+CD8+ CLL BLD: 0.44 % (ref 1.4–2.6)
CD3+CD8+ CELLS # BLD: 744 CELLS/UL (ref 125–1312)
CD3+CD8+ CELLS NFR BLD: 51 % (ref 10–40)
CHLORIDE SERPL-SCNC: 106 MMOL/L (ref 98–107)
CREAT SERPL-MCNC: 0.94 MG/DL (ref 0.67–1.17)
CREAT SERPL-MCNC: 0.95 MG/DL (ref 0.67–1.17)
EGFRCR SERPLBLD CKD-EPI 2021: 86 ML/MIN/1.73M2
EGFRCR SERPLBLD CKD-EPI 2021: 87 ML/MIN/1.73M2
ERYTHROCYTE [DISTWIDTH] IN BLOOD BY AUTOMATED COUNT: 12.7 % (ref 10–15)
EST. AVERAGE GLUCOSE BLD GHB EST-MCNC: 151 MG/DL
GLUCOSE SERPL-MCNC: 153 MG/DL (ref 70–99)
HBA1C MFR BLD: 6.9 %
HCO3 SERPL-SCNC: 26 MMOL/L (ref 22–29)
HCT VFR BLD AUTO: 39.2 % (ref 40–53)
HGB BLD-MCNC: 13 G/DL (ref 13.3–17.7)
MCH RBC QN AUTO: 29.5 PG (ref 26.5–33)
MCHC RBC AUTO-ENTMCNC: 33.2 G/DL (ref 31.5–36.5)
MCV RBC AUTO: 89 FL (ref 78–100)
PLATELET # BLD AUTO: 214 10E3/UL (ref 150–450)
POTASSIUM SERPL-SCNC: 4.2 MMOL/L (ref 3.4–5.3)
RBC # BLD AUTO: 4.41 10E6/UL (ref 4.4–5.9)
SODIUM SERPL-SCNC: 139 MMOL/L (ref 135–145)
T CELL COMMENT: ABNORMAL
TACROLIMUS BLD-MCNC: 5.6 UG/L (ref 5–15)
TME LAST DOSE: NORMAL H
TME LAST DOSE: NORMAL H
WBC # BLD AUTO: 3.6 10E3/UL (ref 4–11)

## 2025-02-17 PROCEDURE — 83036 HEMOGLOBIN GLYCOSYLATED A1C: CPT | Performed by: PHYSICIAN ASSISTANT

## 2025-02-17 PROCEDURE — 85027 COMPLETE CBC AUTOMATED: CPT | Performed by: PATHOLOGY

## 2025-02-17 PROCEDURE — 86359 T CELLS TOTAL COUNT: CPT | Performed by: STUDENT IN AN ORGANIZED HEALTH CARE EDUCATION/TRAINING PROGRAM

## 2025-02-17 PROCEDURE — 99000 SPECIMEN HANDLING OFFICE-LAB: CPT | Performed by: PATHOLOGY

## 2025-02-17 PROCEDURE — 80197 ASSAY OF TACROLIMUS: CPT | Performed by: INTERNAL MEDICINE

## 2025-02-17 PROCEDURE — 36415 COLL VENOUS BLD VENIPUNCTURE: CPT | Performed by: PATHOLOGY

## 2025-02-17 PROCEDURE — 83521 IG LIGHT CHAINS FREE EACH: CPT | Performed by: STUDENT IN AN ORGANIZED HEALTH CARE EDUCATION/TRAINING PROGRAM

## 2025-02-17 PROCEDURE — 80048 BASIC METABOLIC PNL TOTAL CA: CPT | Performed by: PATHOLOGY

## 2025-02-17 NOTE — PROGRESS NOTES
AUDIOLOGY REPORT    BACKGROUND INFORMATION: Murray Nicholson was seen in the Audiology Clinic at [facility] on 2/17/2025 for follow-up. Previous results have revealed a bilateral asymmetric sensorineural hearing loss (essentially normal hearing in the right ear). The patient has been seen previously in this clinic and was fit with a right Phonak Audeo P30R hearing aid on the right ear and a Phonak CROS P transmitter on the left ear on 1/13/2022.     TEST RESULTS AND PROCEDURES:   The hearing devices were thoroughly cleaned and a listening check indicated that the hearing aid sounded crisp and clear with no distortion or weakness noted. The CROS transmitter connected immediately to the hearing aid.    Given that the hearing aids are still under warranty, he would like to send them in to have the batteries replaced before the warranty lapses.    SUMMARY AND RECOMMENDATIONS: A hearing aid check was performed today.  Today's appointment is a no charge visit as the hearing aids are under warranty. The hearing aids will be sent in for repair and he will be contacted to pick them up via [employer] they return. Call this clinic with questions regarding today s visit.        Nehal Christopher  Audiologist  [place] License  #[id number]

## 2025-02-18 ENCOUNTER — MYC MEDICAL ADVICE (OUTPATIENT)
Dept: TRANSPLANT | Facility: CLINIC | Age: 70
End: 2025-02-18
Payer: MEDICARE

## 2025-02-18 LAB
KAPPA LC FREE SER-MCNC: 2.46 MG/DL (ref 0.33–1.94)
KAPPA LC FREE/LAMBDA FREE SER NEPH: 1.03 {RATIO} (ref 0.26–1.65)
LAMBDA LC FREE SERPL-MCNC: 2.4 MG/DL (ref 0.57–2.63)

## 2025-02-18 ASSESSMENT — ENCOUNTER SYMPTOMS: NEW SYMPTOMS OF CORONARY ARTERY DISEASE: 0

## 2025-02-18 NOTE — RESULT ENCOUNTER NOTE
Tacrolimus is 5.6 and within goal (4-6). ~11 Hrs 42 minutes trough, per lab details. No changes to be implement to current Tacrolimus dose of 0.5 mg twice per day. Patient informed via [employer] message.

## 2025-02-18 NOTE — TELEPHONE ENCOUNTER
ISSUE:   Latest Reference Range & Units 02/17/25 10:42   Absolute CD4 441 - 2,156 cells/uL 331 (L)   (L): Data is abnormally low    PLAN:  Mahad Agarwal MD Sveiven, Colette, RN  Okay to stop Bactrim.    OUTCOME:  Bactrim RX discontinued, patient updated via my chart.    Colette Martin RN   Transplant Coordinator  [phone]

## 2025-02-26 ENCOUNTER — MYC MEDICAL ADVICE (OUTPATIENT)
Dept: ENDOCRINOLOGY | Facility: CLINIC | Age: 70
End: 2025-02-26
Payer: MEDICARE

## 2025-02-26 DIAGNOSIS — Z94.0 KIDNEY REPLACED BY TRANSPLANT: ICD-10-CM

## 2025-02-26 RX ORDER — BLOOD SUGAR DIAGNOSTIC
STRIP MISCELLANEOUS
Qty: 300 STRIP | Refills: 1 | OUTPATIENT
Start: 2025-02-26

## 2025-03-03 ENCOUNTER — ANCILLARY PROCEDURE (OUTPATIENT)
Dept: CT IMAGING | Facility: CLINIC | Age: 70
End: 2025-03-03
Attending: STUDENT IN AN ORGANIZED HEALTH CARE EDUCATION/TRAINING PROGRAM
Payer: MEDICARE

## 2025-03-03 DIAGNOSIS — R91.1 LUNG NODULE: ICD-10-CM

## 2025-03-03 PROCEDURE — 71250 CT THORAX DX C-: CPT | Mod: GC | Performed by: RADIOLOGY

## 2025-03-03 RX ORDER — BLOOD SUGAR DIAGNOSTIC
STRIP MISCELLANEOUS
Qty: 100 STRIP | Refills: 1 | Status: SHIPPED | OUTPATIENT
Start: 2025-03-03

## 2025-03-10 ENCOUNTER — DOCUMENTATION ONLY (OUTPATIENT)
Dept: PULMONOLOGY | Facility: CLINIC | Age: 70
End: 2025-03-10
Payer: MEDICARE

## 2025-03-10 PROBLEM — M25.561 ACUTE PAIN OF BOTH KNEES: Status: RESOLVED | Noted: 2024-04-03 | Resolved: 2025-03-10

## 2025-03-10 PROBLEM — M25.562 ACUTE PAIN OF BOTH KNEES: Status: RESOLVED | Noted: 2024-04-03 | Resolved: 2025-03-10

## 2025-03-10 NOTE — NURSING NOTE
Pre-visit planning and chart review completed.     RETURN patient appointment:    3/27 with Dr. Alejandro Murguia  3/3 CT chest wo contrast    - 6 month follow-up .    CE updated. Medications, allergies, problem list, and immunizations reconciled.

## 2025-03-18 NOTE — TELEPHONE ENCOUNTER
Anemia Management Note  SUBJECTIVE/OBJECTIVE:  Referred by Dr. Brice Caraballo on 2015.  Primary Diagnosis: Anemia in Chronic Kidney Disease (N18.5 D63.1)   Secondary Diagnosis: Chronic Kidney Disease, Stage V (N18.5)  Hgb goal range: 9-10  Epo/Darbo: Aranesp 60 mcg every 2 weeks as needed for hgb<10g/dL - in clinic  RX order expires on 3/20/18  Iron regimen: Ferrous Sulfate TID  Lab orders  on 2017 In Epic    Anemia Latest Ref Rng & Units 2017 2017 2/15/2017 2017 3/7/2017 3/15/2017 3/20/2017   ANASTASIYA Dose - - 60 mcg - - 60 mcg - -   Hemoglobin 13.3 - 17.7 g/dL 9.9(L) 9.1(L) 10.5(L) - 9.6(L) 10.2(L) 9.8(L)   IV Iron Dose - - - - 750mg - - -   TSAT 15 - 46 % - - 13(L) - - 13(L) -   Ferritin 26 - 388 ng/mL - - 432(H) - - 860(H) -     BP Readings from Last 3 Encounters:   03/15/17 153/70   17 158/78   17 142/72     Wt Readings from Last 2 Encounters:   03/15/17 198 lb (89.8 kg)   02/15/17 196 lb (88.9 kg)     I spoke with REA Costello and Janine, there is confusion about the aranesp orders which still states to give every 2 weeks and the comments in Epic that the order is updated to every 4 weeks.   His Hgb today is 9.8 so they decided to give the dose today.  Dr Caraballo put in a one time order for a dose of aranesp 60 mcg today.    ASSESSMENT:  Hgb: At goal - received dose in clinic   TSat: not at goal (>30%) but ferritin >500ng/mL. IV iron not indicated at this time per anemia protocol.       PLAN:  Murray will receive his aranesp dose in clinic today and then RTC for hgb then aranesp if needed in 2 week(s)  3/21: Updated aranesp orders to every 2 weeks since dose was needed. Appt is scheduled for .  -mitzy    Orders needed to be renewed (for next follow-up date) in EPIC: Aranesp med order    Iron labs due:  17    Plan discussed with:  Richard in clinic  Plan provided by:  Lakisha    NEXT FOLLOW-UP DATE:  17    Anemia Management Service  Zelda Rich,EllisD and Rosa  Initial SW/CM Assessment/Plan of Care Note     Baseline Assessment  42 year old admitted 3/18/2025 as Observation with a diagnosis of cellulitis and abscess of  left leg.   Prior to admission patient was living with Children, Significant other and residing at House.  Patient does not  have a Power of  for Healthcare.  Patient’s Primary Care Provider is Pcp, Nakul.     Progress Note  This writer met with the patient to introduce the CM role and discuss any discharge needs.  Prior to admission patient was independent and working full time.    Patient will likely have not discharge needs.  This writer will continue to follow the patient during his stay and assist with any discharge needs if they would arise.     Patient does not have a PCP.  Patient provided with list of providers accepting new patients.  Patient plans to review the list and choice a provider prior to discharge.     Plan  Patient/Family Discharge Goal: Home   Is patient/family goal achievable: Yes    SW/CM - Recommendations for Discharge: Home     Barriers to Discharge  Identified Barriers to Discharge/Transition Planning: Medical necessity for acute care        Anticipate patient will not need post-hospital services. Necessary services are available.  Anticipate patient can return to the environment from which patient entered the hospital.   Anticipate patient can provide self-care at discharge.    Refer to SW/CM Flowsheet for objective data.     Medical History  Past Medical History:   Diagnosis Date    Eczema        Prior to Admission Status       Agency/Support  Type of Services Prior to Hospitalization: None               Support Systems: Family members   Home Devices/Equipment: None           Mobility Assist Devices: None  Sensory Support Devices: None    Prior Function                Current Function  Last Filed Values       None            Therapy Recommendations for Discharge:   PT:      Last Filed Values       None          OT:        Last Filed Values       None          SLP:    Last Filed Values       None            Insurance  Primary: [facility] RESOURCES  Secondary: N/A    Disposition Recommendations:  SW/CM recommendation for discharge: Home     30 Day Readmission:  Is patient a 30 day readmission?: No (03/18/25 1448 : Maria Luisa Nieto, RN)              Refugio Marcial  Phone: [phone]  Fax: [phone]

## 2025-03-20 NOTE — PLAN OF CARE
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 Jaime right RNFL thinning   CSF: done in 10/2017: W 14, P 162, IGG index high, OCBs negative.   JCV: positive in 11/2017, index 3.35  VZV: positive in 11/2017  HEP panel: Hep C PCR positive, Hep B core antibody positive , Hep B S antibody positive, Hep B surface antigen negative (concurrent hep C and B treatment with rituximab).   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      Interval hx:  [place] [facility] [place] and her IGG at that time was 430. I communicated with her treating physician and mutually decided to give her IVIG replacement to help fight the infection. Her IGG improved to 685 in March 2025. Last CD19 <1% and ALC is 1. She wants to try extending rituximab dosing by a month until she comes back from a trip to [place] in order to reduce the chances of infection, which is reasonable. I will also have pharmacy go over actemra with her as a potential alternative  Problem: Goal Outcome Summary  Goal: Goal Outcome Summary  Outcome: Improving  Uneventful overnight. VSS, no CO pain, SOB or MEADE. Pt voided 850 ml after evening diuretic. Independent and able to make needs known. No arrhythmias tonight.  No other issues noted. Will continue to monitor and address as needed.         to rituximab. She has not started wellbutrin yet. Her vitamin D prescription was cancelled and she wants to restart.      NMO symptom review:     weakness: yes in 2006, resolved   sensory changes: yes in 2006, persistent in the arms  incoordination: no  falling: no  gait change: yes in 2006, resolved   painful vision loss: yes right posterior optic neuritis in 10/2017. Also reports several episodes of pain and changed light perception in her congenitally blind left eye since 2006.   double vision: no  vertigo: not currently   facial/bulbar weakness: never   Lhermitte's: yes since 2006  bladder/bowel dysfunction: mild urinary urgency since 2006     spasticity: yes in the legs   tonic spasms: yes rare extensor spasms of the RLE  tremors: no  RLS: not asked   dystonia: yes paroxysmal in the right hand.   other movement disorders: no     heat sensitivity: yes severe  fatigue: yes and excessive daytime sleepiness.   depression: no  anxiety: yes  cognitive changes: no     DMT: rituximab since 1/2018  medication side effects: fatigue  last blood work: 4/30/2018     Vitamin D: taking 13229 units weekly   symptomatic meds: has baclofen for spasms but she rarely takes it.                  Review of Systems  All systems reviewed and are negative except as mentioned in the HPI.        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.41) (H53.411)   · Cervical spondylosis (721.0) (M47.812)   · Chronic hepatitis C without hepatic coma (070.54) (B18.2)   · Class 1 obesity with body mass index (BMI) of 31.0 to 31.9 in adult  (278.00,V85.31)  (E66.9,Z68.31)   · Colon polyp (211.3) (K63.5)   · COVID-19 (079.89) (U07.1)   · Elevated IOP (365.00) (H40.059)   · Exposure to medical therapeutic radiation (E873.3) (Y63.3)   · Former smoker (V15.82) (Z87.891)   · Graves disease (242.00) (E05.00)   · Hepatitis B core antibody positive (795.79) (R76.8)   · Hepatitis C virus infection cured after antiviral drug therapy (V12.09) (Z86.19)   · Hip pain, right (719.45) (M25.551)   · History of adenomatous polyp of colon (V12.72) (Z86.010)   · Hypothyroidism (244.9) (E03.9)   · Low back pain (724.2) (M54.50)   · Malignant neoplasm of central portion of left breast in female, estrogen receptor positive  (174.1,V86.0) (C50.112,Z17.0)   · Nausea in adult (787.02) (R11.0)   · 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)   · On rituximab therapy (V58.69) (Z79.620)   · Optic neuritis, right (377.30) (H46.9)   · Other visual distortions and entoptic phenomena (368.15) (H53.19)   · Pain of left lower extremity (729.5) (M79.605)   · Pain, upper back (724.5) (M54.9)   · Photopsia of right eye (368.15) (H53.19)   · PVD (posterior vitreous detachment), right eye (379.21) (H43.811)   · Shingles (053.9) (B02.9)   · Status post bilateral breast reduction (V45.89) (Z98.890)   · Steroid responder, 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) (H40.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)   · Thyroid nodule (241.0) (E04.1)   · Transverse myelitis (323.82) (G37.3)   · Vitamin D deficiency (268.9) (E55.9)   · Vitreous degeneration of right eye (379.21) (H43.811)     Past Medical History  Problems    · History of Abdominal pain (789.00) (R10.9)   · History of Abdominal pain (789.00) (R10.9)   · History of Abdominal pain (789.00) (R10.9)   · History of Abnormal finding on breast imaging (793.89) (R92.8)   · Resolved Date: 21 Mar 2022   · History of Abnormal liver function test (790.6) (R79.89)   · History of Abnormal liver function test (790.6) (R79.89)   · Resolved Date: 21 Mar 2022   · Age-related nuclear cataract of right eye (366.16)  (H25.11)   · History of Allergic conjunctivitis of both eyes (372.14) (H10.13)   · History of Cervical spondylosis (721.0) (M47.812)   · History of Cervical spondylosis (721.0) (M47.812)   · Resolved Date: 11 Nov 2021   · History of Chronic hepatitis C without hepatic coma (070.54) (B18.2)   · History of Chronic hepatitis C without hepatic coma (070.54) (B18.2)   · History of Chronic hepatitis C without hepatic coma (070.54) (B18.2)   · History of Chronic hepatitis C without hepatic coma (070.54) (B18.2)   · History of Congenital cataract (743.30) (Q12.0)   · Removed age 3 - Legally blind left eye - eye exam scheduled   · History of Eye pain (379.91) (H57.10)   · Resolved Date: 03 Jul 2014   · History of Hepatitis B core antibody positive (795.79) (R76.8)   · History of Hepatitis B core antibody positive (795.79) (R76.8)   · History of Hepatitis B core antibody positive (795.79) (R76.8)   · History of Hepatitis B core antibody positive (795.79) (R76.8)   · History of abdominal pain (V13.89) (Z87.898)   · Resolved Date: 11 Nov 2021   · History of acute conjunctivitis (V12.49) (Z86.69)   · History of acute conjunctivitis (V12.49) (Z86.69)   · Resolved Date: 11 Nov 2021   · History of cataract (V12.49) (Z86.69)   · Resolved Date: 10 Mar 2014   · h/o congenital cataract left eye, removed age 3. 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)   · Optic neuritis, right (377.30) (H46.9)   · Photopsia of right eye (368.15) (H53.19)   · History of Pupillary abnormality of left eye (364.75) (H21.562)   · 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 Cody   · History of Dilation And Curettage   · History of Endometrial Biopsy By Suction   · 1/07 Dr. Haq   · History of Exploratory Laparoscopy   · History of Knee Replacement   · Left Knee replaced      Right knee replaced 2000   · History of Mastectomy partial   · History of Rotator Cuff Repair   · Right rotator cuff repair 6/11   · History of Total Knee Arthroplasty     Family History  Mother    · Family history of Breast Cancer (V16.3)  Father    · Family history of Acute Myocardial Infarction (V17.3)   · Family history of cardiac disorder (V17.49) (Z82.49)  Maternal Grandmother    · Family history of diabetes mellitus (V18.0) (Z83.3)   · Family history of malignant neoplasm (V16.9) (Z80.9)   · Family history of Type 2 diabetes mellitus with other circulatory complication     Social History  Problems    · Caffeine use (V49.89) (Z78.9)   · 2 cups daily   · Former smoker (V15.82) (Z87.891)   · No illicit drug use   · Gnosticist Affiliation Taoism   · Social alcohol use (V49.89) (Z78.9)   ·  (V61.07) (Z63.4)     Allergies  Medication    · Motrin SUSP   Adverse Reaction; Lips and Face swelling; Swelling; Recorded By: Manny Russell; 12/7/2012 8:11:46 AM   · ibuprofen   Recorded By: Avani Alaniz; 3/26/2021 2:16:50 PM   · Lisinopril TABS   Recorded By: Cate Fuller; 7/3/2014 2:54:07 PM   · Lyrica  CAPS   Recorded By: Cate Fuller; 7/3/2014 2:54:07 PM  Denied    · Losartan Potassium TABS   Updated By: Cate Fuller; 8/28/2014 12:40:37 PM      Provider Impressions  Assessment:  This is a 65 year old right handed White female patient, Jahova's witness, with PMH significant for left eye blindness due to congenital cataract, transverse myelitis (3/2006), untreated Hep C, HTN, Grave's disease, and obesity who presented originally in 10/2017 with acute right optic neuritis resistant to steroids. improved partially with PLEX. The initial neurological exam was positive for central scotoma and red desaturation in the right eye. I have personally reviewed the MRIs which showed a longitudinally-extensive enhancing lesion in the posterior right optic nerve with chiasmal involvement without demyelinating lesions in the brain. cervical MRI showed a non-enhancing short segment demyelinating plaque at C5 unchanged from the initial spine scan from 2006. CSF showed increased lymphocytes, protein, and IGG index but negative OCBs. OCT/VEP consistent with right optic neuritis. She tested negative for Aqp4 (serum and CSF) and anti MOG antibodies. 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 After consulting with hepatology, she was started on rituximab in January 2018 along with treatment for Hep B and Hep C. She's doing well apart from some fluctuation of residual neurological symptoms.      11/14/2018: stable. some mild spasticity in the legs and new headaches. No relapses. tolerating rituximab well.  06/05/2019: stable. mild fluctuation of residual symptoms. Right hip pain so we need to r/o avascular necrosis of the right hip.   12/04/2019: some nighttime headache and diffuse body pains. Will check ESR and CRP. no new relapses.   06/03/2020: Worsening fatigue and mood but declined wellbutrin, amantadine, and sleep study. Says she won't want to take the SARS-COV-2 vaccine when it becomes available.  Will check Ig, ALC, and B cell phenotypes today.     11/19/2020: No new relapses. she was recently diagnosed with early breast cancer based on biopsy and is planed for lumpectomy with sentinel LN dissection on 12/2 followed by hormonal and radiotherapy. Her labs from June showed normal IGG level and ALC, and zero B cells. She continues to have morning occipital headaches that improve when she stands up and walks but not worsening than before and she has had those for years. I offered her a brain MRI to r/o brain or meningeal mets but she declined given stability and chronicity of her headache which is reasonable. The new diagnosis of breast cancer raises a question about the possibility of paraneoplastic NMOSD so I will send the serum autoimmune encephalopathy panel on her especially to look for anti-CV2. This also raises a question about the relation to (and safety of) rituximab. Studies of rituximab therapy in RA and MS showed no increased risk of breast or other cancers but MS studies with the related ocrelizumab (humanized rituximab) showed some increase in breast cancer cases in patients with PPMS. I will check with her breast oncologist regarding their view of rituximab. The patient feels strongly towards staying on rituximab and is more worried about getting an NMOSD attack if she is to come off of it. Of course, if her CV2 antibody comes back positive then we are clearly dealing with a paraneoplastic NMOSD picture and in that case cancer treatment may lead to remission and there will be no need for long-term therapy with rituximab any more.      02/04/2021: No new relapses. she underwent partial mastectomy in December and is planned for radiation and hormonal therapy soon. Her ENS1 panel came back negative including negative CV2 antibody. Her ALC and IgG levels remain normal. Her breast oncologist had no objection to rituximab. she was wondering if she should proceed with radiation therapy and covid  vaccination. I explained that she should proceed with radiation and hormonal therapy as recommended by the oncologist as these do not have any known implications on her NMOSD or rituximab therapy. I discussed COVID19 vaccine implications.      08/09/2021: After radiation for breast cancer, she started having multiple symptoms including blurring of vision in the right eye, right eye pain, increased headaches, dizziness (leading one time to a fall and inability to recover), and worsening body spasms and bladder symptoms. she is concerned that radiation might have triggered an NMOSD relapse. Her last rituximab dose was in January of 2021 and she competed her second dose of the pfizer covid vaccine exactly four weeks ago. She is scheduled for her next rituximab infusion this Friday. OCT shows stable RNFL thickness OD compared to the last value documented by Dr. Jaime (68 compared to 66). Unlikely to be having optic neuritis but will get a brain MRi to evaluate her dizzy episode given her hx of breast cancer and NMOSD. will check spike antibody.      02/07/2022: MRI brain was unremarkable. No new symptoms but has ongoing fatigue. still thinking about going on hormonal therapy for her breast cancer as recommended by her oncologist. She is worried about her IgG trending down althought it is still in the normal range but because she cannot take blood products including IVIG if she is to develop hypogammglobulinemia, she is wondering if we should consider half dose or extended interval between rituximab doses. all are reasonable options for her but only if she develops recurrent infection or critical IgG levels. For now will keep the same regimen. she is interested in joining the new NMOSD registry. She received her third dose of the Pfizer COVID vaccine and wants to check the antibody level again to decide whether or not she'll take the booster (fourth dose for her).      09/13/2022: last blood work showed decrease of IGG  to below the lower limit of normal range (680). she also had a prolonged shingles infection in the her left breast and back area. this happened in June four months after her rituximab dose requiring prolonged valtrex course. This has since cleared but she is now interested in decreasing rituximab dose, which is reasonable. reports new left lateral thigh numbness (likely meralgia paraesthetica). wants evusheld, which is also reasonable.      03/14/2023: she was admitted to the hospital in January with complicated COVID19 requiring oxygen therapy. she was found to have COVID19 PNA and was also found to have pneumonitis thought to be related to her radiation therapy or rituximab. 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 Her repeat Ig level in February went down to 440 but her ALC was normal at 2.2. We held her last rituximab dose in February (originally planned to be half dose). she reports some worsening of pre-existing visual and sensory symptoms during all this especially when taking hot showers. will repeat Ig level and if above 500, will resume rituximab at half dose. If still low, we will have to consider IVIG replacement if she allows it from the [employer] standpoint. she enrolled in [employer].    10/2/2023: IgG levels improved to normal after decreasing rituximab dose without early B cell repletion. No more serious infections (only minor cold). Complains of fatigue and spontaneous hives. Had many other questions that I answered to the best of my ability. Will continue reduced dose rituximab and will add wellbutrin for fatigue. Will also refer her to dermatology.     3/21/2024: IgG levels and ALC remain normal on half dose rituximab. No more serious infections (only minor COVID19). Saw dermatology. Never started wellbutrin and continues with fatigue. Has itching on the neck and continued left eye pain.     9/23/2024: IgG level decreased slightly. ALC remain normal on half  dose rituximab. No serious infections. Never started wellbutrin and continues with fatigue and low mood. Has itching on the neck and continued left eye pain, headaches, and occasional wavy vision and glare.     3/20/2025: She was admitted to [facility] [place] in December with pneumonia and her IGG at that time was 430. I communicated with her treating physician and mutually decided to give her IVIG replacement to help fight the infection. Her IGG improved to 685 in March 2025. Last CD19 <1% and ALC is 1. She wants to try extending rituximab dosing by a month until she comes back from a trip to [place] in order to reduce the chances of infection, which is reasonable. I will also have pharmacy go over actemra with her as a potential alternative to rituximab. She has not started wellbutrin yet. Her vitamin D prescription was cancelled and she wants to restart.         Plan:  - Acute relapse management: not indicated      - DMT: continue rituximab at half dose for now. Will extend dosing interval by one month given recurrent infections and upcoming trip to [place]. I will also ask our neuroimmunology pharmacist to review actemra with her as a potential alternative to rituximab.     - Will check CBC diff, IgG,and B cell phenotypes with each infusion.      - Symptomatic management: wellbutrin to address fatigue.      - Vitamin D: restart 20752 units a week.      - Smoking: not smoking currently      - PT/OT: no needs      - Instructions: keep an active life style and develop exercise routine as tolerated. Recommend a balanced healthy diet. Avoid excessive amounts of salt, carbs, fat, and red meat. Increase intake of fruits, vegetables, and white meat.      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 Jaime right RNFL thinning   CSF: done in 10/2017: W 14, P 162, IGG index high, OCBs negative.   JCV: positive in 11/2017, index 3.35  VZV: positive in 11/2017  HEP panel: Hep C PCR positive, Hep B core antibody positive , Hep B S antibody positive, Hep B surface antigen negative (concurrent hep C and B treatment with rituximab).   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      Interval hx:  IgG level decreased slightly. ALC remain normal on half dose rituximab. No serious infections. Never started wellbutrin and continues with fatigue and low mood. Has itching on the neck and continued left eye pain, headaches, and occasional wavy vision and glare.      NMO symptom review:     weakness: yes in 2006, resolved   sensory changes: yes in 2006, persistent in the arms  incoordination: no  falling: no  gait change: yes in 2006, resolved   painful vision loss: yes right posterior optic neuritis in 10/2017. Also reports several episodes of pain and changed light perception in her congenitally  blind left eye since 2006.   double vision: no  vertigo: not currently   facial/bulbar weakness: never   Lhermitte's: yes since 2006  bladder/bowel dysfunction: mild urinary urgency since 2006     spasticity: yes in the legs   tonic spasms: yes rare extensor spasms of the RLE  tremors: no  RLS: not asked   dystonia: yes paroxysmal in the right hand.   other movement disorders: no     heat sensitivity: yes severe  fatigue: yes and excessive daytime sleepiness.   depression: no  anxiety: yes  cognitive changes: no     DMT: rituximab since 1/2018  medication side effects: fatigue  last blood work: 4/30/2018     Vitamin D: taking 88566 units weekly   symptomatic meds: has baclofen for spasms but she rarely takes it.                  Review of Systems  All systems reviewed and are negative except as mentioned in the HPI.        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.41) (H53.411)   · Cervical spondylosis (721.0) (M47.812)   · Chronic hepatitis C without hepatic coma (070.54) (B18.2)   · Class 1 obesity with body mass index (BMI) of 31.0 to 31.9 in adult (278.00,V85.31)  (E66.9,Z68.31)   · Colon polyp (211.3) (K63.5)   · COVID-19 (079.89) (U07.1)   · Elevated IOP (365.00) (H40.059)   · Exposure to medical therapeutic radiation (E873.3) (Y63.3)   · Former smoker (V15.82) (Z87.891)   · Graves disease (242.00) (E05.00)   · Hepatitis B core antibody positive (795.79) (R76.8)   · Hepatitis C virus infection cured after antiviral drug therapy (V12.09) (Z86.19)   · Hip pain, right (719.45) (M25.551)   · History of adenomatous  polyp of colon (V12.72) (Z86.010)   · Hypothyroidism (244.9) (E03.9)   · Low back pain (724.2) (M54.50)   · Malignant neoplasm of central portion of left breast in female, estrogen receptor positive  (174.1,V86.0) (C50.112,Z17.0)   · Nausea in adult (787.02) (R11.0)   · 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)   · On rituximab therapy (V58.69) (Z79.620)   · Optic neuritis, right (377.30) (H46.9)   · Other visual distortions and entoptic phenomena (368.15) (H53.19)   · Pain of left lower extremity (729.5) (M79.605)   · Pain, upper back (724.5) (M54.9)   · Photopsia of right eye (368.15) (H53.19)   · PVD (posterior vitreous detachment), right eye (379.21) (H43.811)   · Shingles (053.9) (B02.9)   · Status post bilateral breast reduction (V45.89) (Z98.890)   · Steroid responder, 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) (H40.043)   · Thyroid nodule (241.0) (E04.1)   · Transverse myelitis (323.82) (G37.3)   · Vitamin D deficiency (268.9) (E55.9)   · Vitreous degeneration of right eye (379.21) (H43.811)     Past Medical History  Problems    · History of Abdominal pain (789.00) (R10.9)   · History of Abdominal pain (789.00) (R10.9)   · History of Abdominal pain (789.00) (R10.9)   · History of Abnormal finding on breast imaging (793.89) (R92.8)   · Resolved Date: 21 Mar 2022   · History of Abnormal liver function test (790.6) (R79.89)   · History of Abnormal liver function test (790.6) (R79.89)   · Resolved Date: 21 Mar 2022   · Age-related nuclear cataract of right eye (366.16) (H25.11)   · History of Allergic conjunctivitis of both eyes (372.14) (H10.13)   · History of Cervical spondylosis (721.0) (M47.812)   · History of Cervical spondylosis (721.0) (M47.812)   · Resolved Date: 11 Nov 2021   · History of Chronic hepatitis C without hepatic coma (070.54) (B18.2)   · History of Chronic hepatitis C without hepatic coma (070.54) (B18.2)   · History of Chronic hepatitis C without hepatic coma (070.54) (B18.2)   · History of Chronic hepatitis C without  hepatic coma (070.54) (B18.2)   · History of Congenital cataract (743.30) (Q12.0)   · Removed age 3 - Legally blind left eye - eye exam scheduled   · History of Eye pain (379.91) (H57.10)   · Resolved Date: 03 Jul 2014   · History of Hepatitis B core antibody positive (795.79) (R76.8)   · History of Hepatitis B core antibody positive (795.79) (R76.8)   · History of Hepatitis B core antibody positive (795.79) (R76.8)   · History of Hepatitis B core antibody positive (795.79) (R76.8)   · History of abdominal pain (V13.89) (Z87.898)   · Resolved Date: 11 Nov 2021   · History of acute conjunctivitis (V12.49) (Z86.69)   · History of acute conjunctivitis (V12.49) (Z86.69)   · Resolved Date: 11 Nov 2021   · History of cataract (V12.49) (Z86.69)   · Resolved Date: 10 Mar 2014   · h/o congenital cataract left eye, removed age 3. 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)   · Optic neuritis, right (377.30) (H46.9)   · Photopsia of right eye (368.15) (H53.19)   · History of Pupillary abnormality of left eye (364.75) (H21.562)   · 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 Cody   · History of Dilation And Curettage   · History of Endometrial Biopsy By Suction   · 1/07 Dr. Haq   · History of Exploratory Laparoscopy   · History of Knee Replacement   · Left Knee replaced      Right knee replaced 2000   · History of Mastectomy partial   · History of Rotator Cuff Repair   · Right rotator cuff repair 6/11   · History of Total Knee Arthroplasty     Family History  Mother    · Family history of Breast Cancer (V16.3)  Father    · Family history of Acute Myocardial Infarction (V17.3)   · Family history of cardiac disorder (V17.49) (Z82.49)  Maternal Grandmother    · Family history of diabetes mellitus (V18.0) (Z83.3)   · Family history of malignant neoplasm (V16.9) (Z80.9)   · Family history of Type 2 diabetes mellitus with other circulatory complication     Social History  Problems    · Caffeine use (V49.89) (Z78.9)   · 2 cups daily   · Former smoker (V15.82) (Z87.891)   · No illicit drug use   · Congregation Affiliation Hindu   · Social alcohol use (V49.89) (Z78.9)   ·  (V61.07) (Z63.4)     Allergies  Medication    · Motrin SUSP   Adverse Reaction; Lips and Face swelling; Swelling; Recorded By: Manny Russell; 12/7/2012 8:11:46 AM   · ibuprofen   Recorded By: Avani Alaniz; 3/26/2021 2:16:50 PM   · Lisinopril TABS   Recorded By: Cate Fuller; 7/3/2014 2:54:07 PM   · Lyrica CAPS   Recorded By: Cate Fuller; 7/3/2014 2:54:07 PM  Denied    · Losartan Potassium TABS   Updated By: Cate Fuller; 8/28/2014 12:40:37 PM      Provider Impressions  Assessment:  This is a 65 year old right handed White female patient, Jahova's witness, with PMH significant for 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 improved partially with PLEX. The initial neurological exam was positive for central scotoma and red desaturation in the right eye. I have personally reviewed the MRIs which showed a longitudinally-extensive enhancing lesion in the posterior right optic nerve with chiasmal involvement without demyelinating lesions in the brain. cervical MRI showed a non-enhancing short segment demyelinating plaque at C5 unchanged from the initial spine scan from 2006. CSF showed increased lymphocytes, protein, and IGG index but negative OCBs. OCT/VEP consistent with right optic neuritis. She tested negative for Aqp4 (serum and CSF) and anti MOG antibodies. 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 After consulting with hepatology, she was started on rituximab in January 2018 along with treatment for Hep B and Hep C. She's doing well apart from some fluctuation of residual neurological symptoms.      11/14/2018: stable. some mild spasticity in the legs and new headaches. No relapses. tolerating rituximab well.  06/05/2019: stable. mild fluctuation of residual symptoms. Right hip pain so we need to r/o avascular necrosis of the right hip.   12/04/2019: some nighttime headache and diffuse body pains. Will check ESR and CRP. no new relapses.   06/03/2020: Worsening fatigue and mood but declined wellbutrin, amantadine, and sleep study. Says she won't want to take the SARS-COV-2 vaccine when it becomes available. Will check Ig, ALC, and B cell phenotypes today.     11/19/2020: No new relapses. she was recently diagnosed with early breast cancer based on biopsy and is planed for lumpectomy with sentinel LN dissection on 12/2 followed by hormonal and radiotherapy. Her labs from June showed normal IGG level and ALC, and zero B cells. She continues to have morning occipital headaches that improve when she stands up and walks but not worsening than before and she has had those for years. I  offered her a brain MRI to r/o brain or meningeal mets but she declined given stability and chronicity of her headache which is reasonable. The new diagnosis of breast cancer raises a question about the possibility of paraneoplastic NMOSD so I will send the serum autoimmune encephalopathy panel on her especially to look for anti-CV2. This also raises a question about the relation to (and safety of) rituximab. Studies of rituximab therapy in RA and MS showed no increased risk of breast or other cancers but MS studies with the related ocrelizumab (humanized rituximab) showed some increase in breast cancer cases in patients with PPMS. I will check with her breast oncologist regarding their view of rituximab. The patient feels strongly towards staying on rituximab and is more worried about getting an NMOSD attack if she is to come off of it. Of course, if her CV2 antibody comes back positive then we are clearly dealing with a paraneoplastic NMOSD picture and in that case cancer treatment may lead to remission and there will be no need for long-term therapy with rituximab any more.      02/04/2021: No new relapses. she underwent partial mastectomy in December and is planned for radiation and hormonal therapy soon. Her ENS1 panel came back negative including negative CV2 antibody. Her ALC and IgG levels remain normal. Her breast oncologist had no objection to rituximab. she was wondering if she should proceed with radiation therapy and covid vaccination. I explained that she should proceed with radiation and hormonal therapy as recommended by the oncologist as these do not have any known implications on her NMOSD or rituximab therapy. I discussed COVID19 vaccine implications.      08/09/2021: After radiation for breast cancer, she started having multiple symptoms including blurring of vision in the right eye, right eye pain, increased headaches, dizziness (leading one time to a fall and inability to recover), and  worsening body spasms and bladder symptoms. she is concerned that radiation might have triggered an NMOSD relapse. Her last rituximab dose was in January of 2021 and she competed her second dose of the pfizer covid vaccine exactly four weeks ago. She is scheduled for her next rituximab infusion this Friday. OCT shows stable RNFL thickness OD compared to the last value documented by Dr. Jaime (68 compared to 66). Unlikely to be having optic neuritis but will get a brain MRi to evaluate her dizzy episode given her hx of breast cancer and NMOSD. will check spike antibody.      02/07/2022: MRI brain was unremarkable. No new symptoms but has ongoing fatigue. still thinking about going on hormonal therapy for her breast cancer as recommended by her oncologist. She is worried about her IgG trending down althought it is still in the normal range but because she cannot take blood products including IVIG if she is to develop hypogammglobulinemia, she is wondering if we should consider half dose or extended interval between rituximab doses. all are reasonable options for her but only if she develops recurrent infection or critical IgG levels. For now will keep the same regimen. she is interested in joining the new NMOSD registry. She received her third dose of the Pfizer COVID vaccine and wants to check the antibody level again to decide whether or not she'll take the booster (fourth dose for her).      09/13/2022: last blood work showed decrease of IGG to below the lower limit of normal range (680). she also had a prolonged shingles infection in the her left breast and back area. this happened in June four months after her rituximab dose requiring prolonged valtrex course. This has since cleared but she is now interested in decreasing rituximab dose, which is reasonable. reports new left lateral thigh numbness (likely meralgia paraesthetica). wants angelika, which is also reasonable.      03/14/2023: she was admitted to the  hospital in January with complicated COVID19 requiring oxygen therapy. she was found to have COVID19 PNA and was also found to have pneumonitis thought to be related to her radiation therapy or rituximab. 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 Her repeat Ig level in February went down to 440 but her ALC was normal at 2.2. We held her last rituximab dose in February (originally planned to be half dose). she reports some worsening of pre-existing visual and sensory symptoms during all this especially when taking hot showers. will repeat Ig level and if above 500, will resume rituximab at half dose. If still low, we will have to consider IVIG replacement if she allows it from the [employer] standpoint. she enrolled in [employer].    10/2/2023: IgG levels improved to normal after decreasing rituximab dose without early B cell repletion. No more serious infections (only minor cold). Complains of fatigue and spontaneous hives. Had many other questions that I answered to the best of my ability. Will continue reduced dose rituximab and will add wellbutrin for fatigue. Will also refer her to dermatology.     3/21/2024: IgG levels and ALC remain normal on half dose rituximab. No more serious infections (only minor COVID19). Saw dermatology. Never started wellbutrin and continues with fatigue. Has itching on the neck and continued left eye pain.     9/23/2024: IgG level decreased slightly. ALC remain normal on half dose rituximab. No serious infections. Never started wellbutrin and continues with fatigue and low mood. Has itching on the neck and continued left eye pain, headaches, and occasional wavy vision and glare.         Plan:  - Acute relapse management: not indicated      - DMT: continue rituximab at half dose.      - Will check CBC diff, IgG,and B cell phenotypes with each infusion.      - Symptomatic management: wellbutrin to address fatigue.      - Vitamin D: Continue 19098  units a week.      - Smoking: not smoking currently      - PT/OT: no needs      - Instructions: keep an active life style and develop exercise routine as tolerated. Recommend a balanced healthy diet. Avoid excessive amounts of salt, carbs, fat, and red meat. Increase intake of fruits, vegetables, and white meat.      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 Jaime right RNFL thinning   CSF: done in 10/2017: W 14, P 162, IGG index high, OCBs negative.   JCV: positive in 11/2017, index 3.35  VZV: positive in 11/2017  HEP panel: Hep C PCR positive, Hep B core antibody positive , Hep B S antibody positive, Hep B surface antigen negative (concurrent hep C and B treatment with rituximab).   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      Interval hx:  IgG level  decreased slightly. ALC remain normal on half dose rituximab. No serious infections. Never started wellbutrin and continues with fatigue and low mood. Has itching on the neck and continued left eye pain, headaches, and occasional wavy vision and glare.      NMO symptom review:     weakness: yes in 2006, resolved   sensory changes: yes in 2006, persistent in the arms  incoordination: no  falling: no  gait change: yes in 2006, resolved   painful vision loss: yes right posterior optic neuritis in 10/2017. Also reports several episodes of pain and changed light perception in her congenitally blind left eye since 2006.   double vision: no  vertigo: not currently   facial/bulbar weakness: never   Lhermitte's: yes since 2006  bladder/bowel dysfunction: mild urinary urgency since 2006     spasticity: yes in the legs   tonic spasms: yes rare extensor spasms of the RLE  tremors: no  RLS: not asked   dystonia: yes paroxysmal in the right hand.   other movement disorders: no     heat sensitivity: yes severe  fatigue: yes and excessive daytime sleepiness.   depression: no  anxiety: yes  cognitive changes: no     DMT: rituximab since 1/2018  medication side effects: fatigue  last blood work: 4/30/2018     Vitamin D: taking 65675 units weekly   symptomatic meds: has baclofen for spasms but she rarely takes it.                  Review of Systems  All systems reviewed and are negative except as mentioned in the HPI.        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.41) (H53.411)   · Cervical spondylosis (721.0) (M47.812)   · Chronic  hepatitis C without hepatic coma (070.54) (B18.2)   · Class 1 obesity with body mass index (BMI) of 31.0 to 31.9 in adult (278.00,V85.31)  (E66.9,Z68.31)   · Colon polyp (211.3) (K63.5)   · COVID-19 (079.89) (U07.1)   · Elevated IOP (365.00) (H40.059)   · Exposure to medical therapeutic radiation (E873.3) (Y63.3)   · Former smoker (V15.82) (Z87.891)   · Graves disease (242.00) (E05.00)   · Hepatitis B core antibody positive (795.79) (R76.8)   · Hepatitis C virus infection cured after antiviral drug therapy (V12.09) (Z86.19)   · Hip pain, right (719.45) (M25.551)   · History of adenomatous polyp of colon (V12.72) (Z86.010)   · Hypothyroidism (244.9) (E03.9)   · Low back pain (724.2) (M54.50)   · Malignant neoplasm of central portion of left breast in female, estrogen receptor positive  (174.1,V86.0) (C50.112,Z17.0)   · Nausea in adult (787.02) (R11.0)   · 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)   · On rituximab therapy (V58.69) (Z79.620)   · Optic neuritis, right (377.30) (H46.9)   · Other visual distortions and entoptic phenomena (368.15) (H53.19)   · Pain of left lower extremity (729.5) (M79.605)   · Pain, upper back (724.5) (M54.9)   · Photopsia of right eye (368.15) (H53.19)   · PVD (posterior vitreous detachment), right eye (379.21) (H43.811)   · Shingles (053.9) (B02.9)   · Status post bilateral breast reduction (V45.89) (Z98.890)   · Steroid responder, 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) (H40.043)   · Thyroid nodule (241.0) (E04.1)   · Transverse myelitis (323.82) (G37.3)   · Vitamin D deficiency (268.9) (E55.9)   · Vitreous degeneration of right eye (379.21) (H43.811)     Past Medical History  Problems    · History of Abdominal pain (789.00) (R10.9)   · History of Abdominal pain (789.00) (R10.9)   · History of Abdominal pain (789.00) (R10.9)   · History of Abnormal finding on breast imaging (793.89) (R92.8)   · Resolved Date: 21 Mar 2022   · History of Abnormal liver function test (790.6) (R79.89)   · History of Abnormal liver  function test (790.6) (R79.89)   · Resolved Date: 21 Mar 2022   · Age-related nuclear cataract of right eye (366.16) (H25.11)   · History of Allergic conjunctivitis of both eyes (372.14) (H10.13)   · History of Cervical spondylosis (721.0) (M47.812)   · History of Cervical spondylosis (721.0) (M47.812)   · Resolved Date: 11 Nov 2021   · History of Chronic hepatitis C without hepatic coma (070.54) (B18.2)   · History of Chronic hepatitis C without hepatic coma (070.54) (B18.2)   · History of Chronic hepatitis C without hepatic coma (070.54) (B18.2)   · History of Chronic hepatitis C without hepatic coma (070.54) (B18.2)   · History of Congenital cataract (743.30) (Q12.0)   · Removed age 3 - Legally blind left eye - eye exam scheduled   · History of Eye pain (379.91) (H57.10)   · Resolved Date: 03 Jul 2014   · History of Hepatitis B core antibody positive (795.79) (R76.8)   · History of Hepatitis B core antibody positive (795.79) (R76.8)   · History of Hepatitis B core antibody positive (795.79) (R76.8)   · History of Hepatitis B core antibody positive (795.79) (R76.8)   · History of abdominal pain (V13.89) (Z87.898)   · Resolved Date: 11 Nov 2021   · History of acute conjunctivitis (V12.49) (Z86.69)   · History of acute conjunctivitis (V12.49) (Z86.69)   · Resolved Date: 11 Nov 2021   · History of cataract (V12.49) (Z86.69)   · Resolved Date: 10 Mar 2014   · h/o congenital cataract left eye, removed age 3. 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)   · Optic neuritis, right (377.30) (H46.9)   · Photopsia of  right eye (368.15) (H53.19)   · History of Pupillary abnormality of left eye (364.75) (H21.562)   · 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 Cody   · History of Dilation And Curettage   · History of Endometrial Biopsy By Suction   · 1/07 Dr. Haq   · History of Exploratory Laparoscopy   · History of Knee Replacement   · Left Knee replaced      Right knee replaced 2000   · History of Mastectomy partial   · History of Rotator Cuff Repair   · Right rotator cuff repair 6/11   · History of Total Knee Arthroplasty     Family History  Mother    · Family history of Breast Cancer (V16.3)  Father    · Family history of Acute Myocardial Infarction (V17.3)   · Family history of cardiac disorder (V17.49) (Z82.49)  Maternal Grandmother    · Family history of diabetes mellitus (V18.0) (Z83.3)   · Family history of malignant neoplasm (V16.9) (Z80.9)   · Family history of Type 2 diabetes mellitus with other circulatory complication     Social History  Problems    · Caffeine use (V49.89) (Z78.9)   · 2 cups daily   · Former smoker (V15.82) (Z87.891)   · No illicit drug use   · [employer] Affiliation Shinto   · Social alcohol use (V49.89) (Z78.9)   ·  (V61.07) (Z63.4)     Allergies  Medication    · Motrin SUSP   Adverse Reaction; Lips and Face swelling; Swelling; Recorded By: Manny Russell; 12/7/2012 8:11:46 AM   · ibuprofen   Recorded By: Avani Alaniz;  3/26/2021 2:16:50 PM   · Lisinopril TABS   Recorded By: Cate Fuller; 7/3/2014 2:54:07 PM   · Lyrica CAPS   Recorded By: Cate Fuller; 7/3/2014 2:54:07 PM  Denied    · Losartan Potassium TABS   Updated By: Cate Fuller; 8/28/2014 12:40:37 PM      Provider Impressions  Assessment:  This is a 65 year old right handed White female patient, Jahova's witness, with PMH significant for left eye blindness due to congenital cataract, transverse myelitis (3/2006), untreated Hep C, HTN, Grave's disease, and obesity who presented originally in 10/2017 with acute right optic neuritis resistant to steroids. improved partially with PLEX. The initial neurological exam was positive for central scotoma and red desaturation in the right eye. I have personally reviewed the MRIs which showed a longitudinally-extensive enhancing lesion in the posterior right optic nerve with chiasmal involvement without demyelinating lesions in the brain. cervical MRI showed a non-enhancing short segment demyelinating plaque at C5 unchanged from the initial spine scan from 2006. CSF showed increased lymphocytes, protein, and IGG index but negative OCBs. OCT/VEP consistent with right optic neuritis. She tested negative for Aqp4 (serum and CSF) and anti MOG antibodies. 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 After consulting with hepatology, she was started on rituximab in January 2018 along with treatment for Hep B and Hep C. She's doing well apart from some fluctuation of residual neurological symptoms.      11/14/2018: stable. some mild spasticity in the legs and new headaches. No relapses. tolerating rituximab well.  06/05/2019: stable. mild fluctuation of residual symptoms. Right hip pain so we need to r/o avascular necrosis of the right hip.   12/04/2019: some nighttime headache and diffuse body pains. Will check ESR and CRP. no new relapses.   06/03/2020: Worsening fatigue and mood but declined wellbutrin,  amantadine, and sleep study. Says she won't want to take the SARS-COV-2 vaccine when it becomes available. Will check Ig, ALC, and B cell phenotypes today.     11/19/2020: No new relapses. she was recently diagnosed with early breast cancer based on biopsy and is planed for lumpectomy with sentinel LN dissection on 12/2 followed by hormonal and radiotherapy. Her labs from June showed normal IGG level and ALC, and zero B cells. She continues to have morning occipital headaches that improve when she stands up and walks but not worsening than before and she has had those for years. I offered her a brain MRI to r/o brain or meningeal mets but she declined given stability and chronicity of her headache which is reasonable. The new diagnosis of breast cancer raises a question about the possibility of paraneoplastic NMOSD so I will send the serum autoimmune encephalopathy panel on her especially to look for anti-CV2. This also raises a question about the relation to (and safety of) rituximab. Studies of rituximab therapy in RA and MS showed no increased risk of breast or other cancers but MS studies with the related ocrelizumab (humanized rituximab) showed some increase in breast cancer cases in patients with PPMS. I will check with her breast oncologist regarding their view of rituximab. The patient feels strongly towards staying on rituximab and is more worried about getting an NMOSD attack if she is to come off of it. Of course, if her CV2 antibody comes back positive then we are clearly dealing with a paraneoplastic NMOSD picture and in that case cancer treatment may lead to remission and there will be no need for long-term therapy with rituximab any more.      02/04/2021: No new relapses. she underwent partial mastectomy in December and is planned for radiation and hormonal therapy soon. Her ENS1 panel came back negative including negative CV2 antibody. Her ALC and IgG levels remain normal. Her breast oncologist had  no objection to rituximab. she was wondering if she should proceed with radiation therapy and covid vaccination. I explained that she should proceed with radiation and hormonal therapy as recommended by the oncologist as these do not have any known implications on her NMOSD or rituximab therapy. I discussed COVID19 vaccine implications.      08/09/2021: After radiation for breast cancer, she started having multiple symptoms including blurring of vision in the right eye, right eye pain, increased headaches, dizziness (leading one time to a fall and inability to recover), and worsening body spasms and bladder symptoms. she is concerned that radiation might have triggered an NMOSD relapse. Her last rituximab dose was in January of 2021 and she competed her second dose of the pfizer covid vaccine exactly four weeks ago. She is scheduled for her next rituximab infusion this Friday. OCT shows stable RNFL thickness OD compared to the last value documented by Dr. Jaime (68 compared to 66). Unlikely to be having optic neuritis but will get a brain MRi to evaluate her dizzy episode given her hx of breast cancer and NMOSD. will check spike antibody.      02/07/2022: MRI brain was unremarkable. No new symptoms but has ongoing fatigue. still thinking about going on hormonal therapy for her breast cancer as recommended by her oncologist. She is worried about her IgG trending down althought it is still in the normal range but because she cannot take blood products including IVIG if she is to develop hypogammglobulinemia, she is wondering if we should consider half dose or extended interval between rituximab doses. all are reasonable options for her but only if she develops recurrent infection or critical IgG levels. For now will keep the same regimen. she is interested in joining the new NMOSD registry. She received her third dose of the Pfizer COVID vaccine and wants to check the antibody level again to decide whether or not  she'll take the booster (fourth dose for her).      09/13/2022: last blood work showed decrease of IGG to below the lower limit of normal range (680). she also had a prolonged shingles infection in the her left breast and back area. this happened in June four months after her rituximab dose requiring prolonged valtrex course. This has since cleared but she is now interested in decreasing rituximab dose, which is reasonable. reports new left lateral thigh numbness (likely meralgia paraesthetica). wants evusheld, which is also reasonable.      03/14/2023: she was admitted to the hospital in January with complicated COVID19 requiring oxygen therapy. she was found to have COVID19 PNA and was also found to have pneumonitis thought to be related to her radiation therapy or rituximab. 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 Her repeat Ig level in February went down to 440 but her ALC was normal at 2.2. We held her last rituximab dose in February (originally planned to be half dose). she reports some worsening of pre-existing visual and sensory symptoms during all this especially when taking hot showers. will repeat Ig level and if above 500, will resume rituximab at half dose. If still low, we will have to consider IVIG replacement if she allows it from the [employer] standpoint. she enrolled in [facility] [place].    10/2/2023: IgG levels improved to normal after decreasing rituximab dose without early B cell repletion. No more serious infections (only minor cold). Complains of fatigue and spontaneous hives. Had many other questions that I answered to the best of my ability. Will continue reduced dose rituximab and will add wellbutrin for fatigue. Will also refer her to dermatology.     3/21/2024: IgG levels and ALC remain normal on half dose rituximab. No more serious infections (only minor COVID19). Saw dermatology. Never started wellbutrin and continues with fatigue. Has itching on the  neck and continued left eye pain.     9/23/2024: IgG level decreased slightly. ALC remain normal on half dose rituximab. No serious infections. Never started wellbutrin and continues with fatigue and low mood. Has itching on the neck and continued left eye pain, headaches, and occasional wavy vision and glare.         Plan:  - Acute relapse management: not indicated      - DMT: continue rituximab at half dose.      - Will check CBC diff, IgG,and B cell phenotypes with each infusion.      - Symptomatic management: wellbutrin to address fatigue.      - Vitamin D: Continue 34444 units a week.      - Smoking: not smoking currently      - PT/OT: no needs      - Instructions: keep an active life style and develop exercise routine as tolerated. Recommend a balanced healthy diet. Avoid excessive amounts of salt, carbs, fat, and red meat. Increase intake of fruits, vegetables, and white meat.      - Follow up: in 6 months    Reji Ryan MD       3/20/2025

## 2025-03-27 ENCOUNTER — ONCOLOGY VISIT (OUTPATIENT)
Dept: PULMONOLOGY | Facility: CLINIC | Age: 70
End: 2025-03-27
Attending: STUDENT IN AN ORGANIZED HEALTH CARE EDUCATION/TRAINING PROGRAM
Payer: MEDICARE

## 2025-03-27 VITALS
DIASTOLIC BLOOD PRESSURE: 73 MMHG | OXYGEN SATURATION: 100 % | RESPIRATION RATE: 16 BRPM | SYSTOLIC BLOOD PRESSURE: 105 MMHG | TEMPERATURE: 97.6 F | HEART RATE: 95 BPM | BODY MASS INDEX: 26.3 KG/M2 | WEIGHT: 173 LBS

## 2025-03-27 DIAGNOSIS — R91.1 LUNG NODULE: Primary | ICD-10-CM

## 2025-03-27 PROCEDURE — G0463 HOSPITAL OUTPT CLINIC VISIT: HCPCS | Performed by: STUDENT IN AN ORGANIZED HEALTH CARE EDUCATION/TRAINING PROGRAM

## 2025-03-27 ASSESSMENT — PAIN SCALES - GENERAL: PAINLEVEL_OUTOF10: NO PAIN (0)

## 2025-03-27 NOTE — NURSING NOTE
"Oncology Rooming Note    March 27, 2025 8:05 AM   Murray Nicholson is a 70 year old male who presents for:    Chief Complaint   Patient presents with    Oncology Clinic Visit     Lung Nodule     Initial Vitals: /73   Pulse 95   Temp 97.6  F (36.4  C) (Oral)   Resp 16   Wt 78.5 kg (173 lb)   SpO2 100%   BMI 26.30 kg/m   Estimated body mass index is 26.3 kg/m  as calculated from the following:    Height as of 7/15/24: 1.727 m (5' 8\").    Weight as of this encounter: 78.5 kg (173 lb). Body surface area is 1.94 meters squared.  No Pain (0) Comment: Data Unavailable   No LMP for male patient.  Allergies reviewed: Yes  Medications reviewed: Yes    Medications: Medication refills not needed today.  Pharmacy name entered into EPIC:    [id number] - [place], [place] - [address] [place] [address]    Frailty Screening:   Is the patient here for a new oncology consult visit in cancer care? 2. No    PHQ9:  Did this patient require a PHQ9?: No      Clinical concerns:  Patient states there are no new concerns to discuss with provider.  Dr Murguia was not notified.        Patt Lao CMA              "

## 2025-03-27 NOTE — PROGRESS NOTES
LUNG NODULE & INTERVENTIONAL PULMONARY CLINIC  [facility]    Murray Nicholson MRN# [id number]   Age: 70 year old YOB: 1955     Reason for Consultation: Lung nodule follow up    Requesting Physician: Alejandro Murguia DO  [address] [facility] [address]  [place] [address]     Assessment and Plan:    Murray Nicholson is a 70 year old male who presents for evaluation of a a lung nodule.    I independently reviewed their CT scan from 3/3/25 which reveals a stable LLL GGO right at the fissure. This has been stable since 2023.     Will make sure it is stable for 5 years. Repeat CT in 6 months.       Patient indicated understanding and agreed to the plan of care. All questions answered.     Alejandro Murguia DO   of Medicine  Interventional Pulmonology  Department of Pulmonary, Allergy, Critical Care and Sleep Medicine   [facility]     History:  Murray Nicholson is a 70 year old male who presents for evaluation of a LLL lung nodule.   No changes to his health.   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     Medications:  Current Outpatient Medications   Medication Sig Dispense Refill    alcohol swab prep pads Use to swab area of injection/davida as directed. 300 each 6    amLODIPine (NORVASC) 5 MG tablet Take 1 tablet (5 mg) by mouth daily 90 tablet 3    aspirin 81 MG EC tablet Take 81 mg by mouth every evening      atorvastatin (LIPITOR) 40 MG tablet Take 1 tablet (40 mg) by mouth every evening. 30 tablet 11    Biotin 10 MG CAPS Take 10 mg by mouth daily      blood glucose (ACCU-CHEK FASTCLIX) lancing device USE WITH LANCETS 1 kit 0    blood glucose (NO BRAND SPECIFIED) test strip Use to test blood sugar 3 times daily or as directed. Any covered brand that works with meter. 300 strip 1    blood glucose monitoring (ACCU-CHEK FASTCLIX) lancets USE TO TEST 3 TIMES DAILY OR AS DIRECTED. 300 each 1    blood glucose monitoring  (SOFTCLIX) lancets Use to test blood sugar 1-2 times daily. 250 each 5    blood glucose monitoring (SOFTCLIX) lancets Use to test blood sugar 3 times daily. 300 each 6    Blood Glucose Monitoring Suppl (ACCU-CHEK GUIDE) w/Device KIT USE TO TEST BLOOD SUGAR THREE TIMES DAILY AS DIRECTED.      cyanocobalamin (VITAMIN B-12) 500 MCG tablet Take 500 mcg by mouth daily      empagliflozin (JARDIANCE) 10 MG TABS tablet Take 1 tablet (10 mg) by mouth daily 90 tablet 2    loratadine (CLARITIN) 10 MG tablet Take 10 mg by mouth every evening Patient takes at bedtime      magnesium oxide (MAG-OX) 400 MG tablet Take 1 tablet (400 mg) by mouth 2 times daily. 180 tablet 3    metFORMIN (GLUCOPHAGE XR) 500 MG 24 hr tablet TAKE 4 TABLETS BY MOUTH EVERY MORNING. Please do NOT fill today 1/10/2025. 360 tablet 3    Multiple Vitamins-Minerals (HAIR SKIN NAILS PO) Take by mouth every morning      mycophenolate (GENERIC EQUIVALENT) 250 MG capsule Take 2 capsules (500 mg) by mouth 2 times daily. 120 capsule 11    OMEPRAZOLE PO Take 20 mg by mouth every morning      tacrolimus (GENERIC EQUIVALENT) 0.5 MG capsule Take 1 capsule (0.5 mg) by mouth 2 times daily 180 capsule 3    thin (NO BRAND SPECIFIED) lancets Use with lanceting device 3x daily. Any covered brand that works with lancing device. 300 each 1     Current Facility-Administered Medications   Medication Dose Route Frequency Provider Last Rate Last Admin    cilgavimab 300 mg/tixagevimab 300 mg (EVUSHELD) - FULL DOSE  6 mL Intramuscular Once Dean Phillip MD         Facility-Administered Medications Ordered in Other Visits   Medication Dose Route Frequency Provider Last Rate Last Admin    diatrizoate meglumine-sodium (GASTROGRAFIN/GASTROVIEW) 66-10 % solution 480 mL  480 mL Rectal Once Christopher Baker MD             Physical exam:  /73   Pulse 95   Temp 97.6  F (36.4  C) (Oral)   Resp 16   Wt 78.5 kg (173 lb)   SpO2 100%   BMI 26.30 kg/m    Wt Readings from  Last 4 Encounters:   03/27/25 78.5 kg (173 lb)   12/13/24 78 kg (172 lb)   08/22/24 79.4 kg (175 lb)   07/16/24 77.7 kg (171 lb 3.2 oz)     General: Well appearing, nonlabored breathing  Neuro: Answering questions appropriately  Psych: Normal affect

## 2025-06-03 DIAGNOSIS — Z94.1 HEART REPLACED BY TRANSPLANT (H): Primary | ICD-10-CM

## 2025-06-10 NOTE — PROGRESS NOTES
Medicare Preventive Visit Patient Instructions  Thank you for completing your Welcome to Medicare Visit or Medicare Annual Wellness Visit today. Your next wellness visit will be due in one year (6/11/2026).  The screening/preventive services that you may require over the next 5-10 years are detailed below. Some tests may not apply to you based off risk factors and/or age. Screening tests ordered at today's visit but not completed yet may show as past due. Also, please note that scanned in results may not display below.  Preventive Screenings:  Service Recommendations Previous Testing/Comments   Colorectal Cancer Screening  * Colonoscopy    * Fecal Occult Blood Test (FOBT)/Fecal Immunochemical Test (FIT)  * Fecal DNA/Cologuard Test  * Flexible Sigmoidoscopy Age: 45-75 years old   Colonoscopy: every 10 years (may be performed more frequently if at higher risk)  OR  FOBT/FIT: every 1 year  OR  Cologuard: every 3 years  OR  Sigmoidoscopy: every 5 years  Screening may be recommended earlier than age 45 if at higher risk for colorectal cancer. Also, an individualized decision between you and your healthcare provider will decide whether screening between the ages of 76-85 would be appropriate. Colonoscopy: Not on file  FOBT/FIT: Not on file  Cologuard: Not on file  Sigmoidoscopy: Not on file    Screening Not Indicated     Breast Cancer Screening Age: 40+ years old  Frequency: every 1-2 years  Not required if history of left and right mastectomy Mammogram: Not on file        Cervical Cancer Screening Between the ages of 21-29, pap smear recommended once every 3 years.   Between the ages of 30-65, can perform pap smear with HPV co-testing every 5 years.   Recommendations may differ for women with a history of total hysterectomy, cervical cancer, or abnormal pap smears in past. Pap Smear: Not on file    Screening Not Indicated   Hepatitis C Screening Once for adults born between 1945 and 1965  More frequently in patients at  Patient was called back in regard to his voicemail message.  Patient was advised we have yet to receive any information from his insurance in regard to leave of absence or return to work.  Patient was advised to have these papers faxed to [phone].  Patient was advised to call back directly at his earliest convenience with further questions or concerns.   high risk for Hepatitis C Hep C Antibody: Not on file        Diabetes Screening 1-2 times per year if you're at risk for diabetes or have pre-diabetes Fasting glucose: 103 mg/dL (5/27/2025)  A1C: No results in last 5 years (No results in last 5 years)  Screening Current   Cholesterol Screening Once every 5 years if you don't have a lipid disorder. May order more often based on risk factors. Lipid panel: 05/27/2025    Screening Not Indicated  History Lipid Disorder     Other Preventive Screenings Covered by Medicare:  Abdominal Aortic Aneurysm (AAA) Screening: covered once if your at risk. You're considered to be at risk if you have a family history of AAA.  Lung Cancer Screening: covers low dose CT scan once per year if you meet all of the following conditions: (1) Age 55-77; (2) No signs or symptoms of lung cancer; (3) Current smoker or have quit smoking within the last 15 years; (4) You have a tobacco smoking history of at least 20 pack years (packs per day multiplied by number of years you smoked); (5) You get a written order from a healthcare provider.  Glaucoma Screening: covered annually if you're considered high risk: (1) You have diabetes OR (2) Family history of glaucoma OR (3)  aged 50 and older OR (4)  American aged 65 and older  Osteoporosis Screening: covered every 2 years if you meet one of the following conditions: (1) You're estrogen deficient and at risk for osteoporosis based off medical history and other findings; (2) Have a vertebral abnormality; (3) On glucocorticoid therapy for more than 3 months; (4) Have primary hyperparathyroidism; (5) On osteoporosis medications and need to assess response to drug therapy.   Last bone density test (DXA Scan): Not on file.  HIV Screening: covered annually if you're between the age of 15-65. Also covered annually if you are younger than 15 and older than 65 with risk factors for HIV infection. For pregnant patients, it is covered up to  3 times per pregnancy.    Immunizations:  Immunization Recommendations   Influenza Vaccine Annual influenza vaccination during flu season is recommended for all persons aged >= 6 months who do not have contraindications   Pneumococcal Vaccine   * Pneumococcal conjugate vaccine = PCV13 (Prevnar 13), PCV15 (Vaxneuvance), PCV20 (Prevnar 20)  * Pneumococcal polysaccharide vaccine = PPSV23 (Pneumovax) Adults 19-63 yo with certain risk factors or if 65+ yo  If never received any pneumonia vaccine: recommend Prevnar 20 (PCV20)  Give PCV20 if previously received 1 dose of PCV13 or PPSV23   Hepatitis B Vaccine 3 dose series if at intermediate or high risk (ex: diabetes, end stage renal disease, liver disease)   Respiratory syncytial virus (RSV) Vaccine - COVERED BY MEDICARE PART D  * RSVPreF3 (Arexvy) CDC recommends that adults 60 years of age and older may receive a single dose of RSV vaccine using shared clinical decision-making (SCDM)   Tetanus (Td) Vaccine - COST NOT COVERED BY MEDICARE PART B Following completion of primary series, a booster dose should be given every 10 years to maintain immunity against tetanus. Td may also be given as tetanus wound prophylaxis.   Tdap Vaccine - COST NOT COVERED BY MEDICARE PART B Recommended at least once for all adults. For pregnant patients, recommended with each pregnancy.   Shingles Vaccine (Shingrix) - COST NOT COVERED BY MEDICARE PART B  2 shot series recommended in those 19 years and older who have or will have weakened immune systems or those 50 years and older     Health Maintenance Due:  There are no preventive care reminders to display for this patient.  Immunizations Due:      Topic Date Due   • COVID-19 Vaccine (3 - 2024-25 season) 09/01/2024     Advance Directives   What are advance directives?  Advance directives are legal documents that state your wishes and plans for medical care. These plans are made ahead of time in case you lose your ability to make decisions for  yourself. Advance directives can apply to any medical decision, such as the treatments you want, and if you want to donate organs.   What are the types of advance directives?  There are many types of advance directives, and each state has rules about how to use them. You may choose a combination of any of the following:  Living will:  This is a written record of the treatment you want. You can also choose which treatments you do not want, which to limit, and which to stop at a certain time. This includes surgery, medicine, IV fluid, and tube feedings.   Durable power of  for healthcare (DPAHC):  This is a written record that states who you want to make healthcare choices for you when you are unable to make them for yourself. This person, called a proxy, is usually a family member or a friend. You may choose more than 1 proxy.  Do not resuscitate (DNR) order:  A DNR order is used in case your heart stops beating or you stop breathing. It is a request not to have certain forms of treatment, such as CPR. A DNR order may be included in other types of advance directives.  Medical directive:  This covers the care that you want if you are in a coma, near death, or unable to make decisions for yourself. You can list the treatments you want for each condition. Treatment may include pain medicine, surgery, blood transfusions, dialysis, IV or tube feedings, and a ventilator (breathing machine).  Values history:  This document has questions about your views, beliefs, and how you feel and think about life. This information can help others choose the care that you would choose.  Why are advance directives important?  An advance directive helps you control your care. Although spoken wishes may be used, it is better to have your wishes written down. Spoken wishes can be misunderstood, or not followed. Treatments may be given even if you do not want them. An advance directive may make it easier for your family to make  difficult choices about your care.   Fall Prevention    Fall prevention  includes ways to make your home and other areas safer. It also includes ways you can move more carefully to prevent a fall. Health conditions that cause changes in your blood pressure, vision, or muscle strength and coordination may increase your risk for falls. Medicines may also increase your risk for falls if they make you dizzy, weak, or sleepy.   Fall prevention tips:   Stand or sit up slowly.    Use assistive devices as directed.    Wear shoes that fit well and have soles that .    Wear a personal alarm.    Stay active.    Manage your medical conditions.    Home Safety Tips:  Add items to prevent falls in the bathroom.    Keep paths clear.    Install bright lights in your home.    Keep items you use often on shelves within reach.    Paint or place reflective tape on the edges of your stairs.    Urinary Incontinence   Urinary incontinence (UI)  is when you lose control of your bladder. UI develops because your bladder cannot store or empty urine properly. The 3 most common types of UI are stress incontinence, urge incontinence, or both.  Medicines:   May be given to help strengthen your bladder control. Report any side effects of medication to your healthcare provider.  Do pelvic muscle exercises often:  Your pelvic muscles help you stop urinating. Squeeze these muscles tight for 5 seconds, then relax for 5 seconds. Gradually work up to squeezing for 10 seconds. Do 3 sets of 15 repetitions a day, or as directed. This will help strengthen your pelvic muscles and improve bladder control.  Train your bladder:  Go to the bathroom at set times, such as every 2 hours, even if you do not feel the urge to go. You can also try to hold your urine when you feel the urge to go. For example, hold your urine for 5 minutes when you feel the urge to go. As that becomes easier, hold your urine for 10 minutes.   Self-care:   Keep a UI record.  Write  down how often you leak urine and how much you leak. Make a note of what you were doing when you leaked urine.  Drink liquids as directed. You may need to limit the amount of liquid you drink to help control your urine leakage. Do not drink any liquid right before you go to bed. Limit or do not have drinks that contain caffeine or alcohol.   Prevent constipation.  Eat a variety of high-fiber foods. Good examples are high-fiber cereals, beans, vegetables, and whole-grain breads. Walking is the best way to trigger your intestines to have a bowel movement.  Exercise regularly and maintain a healthy weight.  Weight loss and exercise will decrease pressure on your bladder and help you control your leakage.   Use a catheter as directed  to help empty your bladder. A catheter is a tiny, plastic tube that is put into your bladder to drain your urine.   Go to behavior therapy as directed.  Behavior therapy may be used to help you learn to control your urge to urinate.    Weight Management   Why it is important to manage your weight:  Being overweight increases your risk of health conditions such as heart disease, high blood pressure, type 2 diabetes, and certain types of cancer. It can also increase your risk for osteoarthritis, sleep apnea, and other respiratory problems. Aim for a slow, steady weight loss. Even a small amount of weight loss can lower your risk of health problems.  How to lose weight safely:  A safe and healthy way to lose weight is to eat fewer calories and get regular exercise. You can lose up about 1 pound a week by decreasing the number of calories you eat by 500 calories each day.   Healthy meal plan for weight management:  A healthy meal plan includes a variety of foods, contains fewer calories, and helps you stay healthy. A healthy meal plan includes the following:  Eat whole-grain foods more often.  A healthy meal plan should contain fiber. Fiber is the part of grains, fruits, and vegetables that is  not broken down by your body. Whole-grain foods are healthy and provide extra fiber in your diet. Some examples of whole-grain foods are whole-wheat breads and pastas, oatmeal, brown rice, and bulgur.  Eat a variety of vegetables every day.  Include dark, leafy greens such as spinach, kale, kena greens, and mustard greens. Eat yellow and orange vegetables such as carrots, sweet potatoes, and winter squash.   Eat a variety of fruits every day.  Choose fresh or canned fruit (canned in its own juice or light syrup) instead of juice. Fruit juice has very little or no fiber.  Eat low-fat dairy foods.  Drink fat-free (skim) milk or 1% milk. Eat fat-free yogurt and low-fat cottage cheese. Try low-fat cheeses such as mozzarella and other reduced-fat cheeses.  Choose meat and other protein foods that are low in fat.  Choose beans or other legumes such as split peas or lentils. Choose fish, skinless poultry (chicken or turkey), or lean cuts of red meat (beef or pork). Before you cook meat or poultry, cut off any visible fat.   Use less fat and oil.  Try baking foods instead of frying them. Add less fat, such as margarine, sour cream, regular salad dressing and mayonnaise to foods. Eat fewer high-fat foods. Some examples of high-fat foods include french fries, doughnuts, ice cream, and cakes.  Eat fewer sweets.  Limit foods and drinks that are high in sugar. This includes candy, cookies, regular soda, and sweetened drinks.  Exercise:  Exercise at least 30 minutes per day on most days of the week. Some examples of exercise include walking, biking, dancing, and swimming. You can also fit in more physical activity by taking the stairs instead of the elevator or parking farther away from stores. Ask your healthcare provider about the best exercise plan for you.    © Copyright [employer] 2018 Information is for End User's use only and may not be sold, redistributed or otherwise used for commercial purposes. All  illustrations and images included in CareNotes® are the copyrighted property of [employer] or [employer]

## 2025-07-06 NOTE — PROGRESS NOTES
HEMODIALYSIS TREATMENT NOTE    Date: 5/17/2018  Time: 2:22 PM    Data:  Pre Wt: 80 kg (176 lb 5.9 oz)   Desired Wt: 77 kg   Post Wt: 77.7 kg (171 lb 4.8 oz)  Weight gain: -2.3 kg   Weight change: 2.3 kg  Ultrafiltration - Post Run Net Total Removed (mL): 2500 mL  Ultrafiltration - Post Run Net Total Gain (mL): 0 mL  Vascular Access Status: Yes, secured and visible  Dialyzer Rinse: Clear  Total Blood Volume Processed: 78.4  Total Dialysis (Treatment) Time:  3.5 hrs    Lab:   Yes, AM labs resulted    Interventions/Assessment:  Stable and HD initiated, pt wanted to attempt 2 kg fluid removal only, goal set at 2.7 kg. Pt tolerated HD well without complications. Discharged stable, new dressing applied, and Heparin lock to CVC ports post dialysis. Handoff to primary RN.     Plan:    Next HD Saturday 5/19/2018.     Heterosexual

## 2025-07-21 NOTE — PROGRESS NOTES
[facility] Dermatology Note  Encounter Date: Jul 22, 2025  Office Visit     Reviewed patient's past medical history and pertinent chart review prior to patient's visit today.     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 We discussed the importance of self exams at home.   - ABCDEs: Counseled ABCDEs of melanoma: Asymmetry, Border (irregularity), Color (not uniform, changes in color), Diameter (greater than 6 mm which is about the size of a pencil eraser), and Evolving (any changes in preexisting moles).  - Sun protection: Counseled SPF 30+ sunscreen, UPF clothing, sun avoidance, tanning bed avoidance.    # Dermatofibromas   # Seborrheic keratoses  - We discussed the benign nature of the skin lesions. No treatment required. Continued observation recommended. Follow up with any concerns.      Follow-up:  Annual for follow up full body skin exam, as needed for new or changing lesions or new concerns    All risks, benefits and alternatives were discussed with patient.  Patient is in agreement and understands the assessment and plan.  All questions were answered.  Talia Hernandez PA-C  [facility] Dermat[facility]    ____________________________________________    CC: Skin Check (Murray is here today for a skin check )    HPI:  Mr. Murray Nicholson is a(n) 70 year old male who presents today as a return patient for a full body skin cancer screening. The patient has a history of kidney and heart transplant, chronically immunosuppressed. The patient denies a personal history of skin cancer. No specific cutaneous concerns today. The patient reports trying  to be diligent with photoprotection.      Physical Exam:  Vitals: There were no vitals taken for this visit.  SKIN: Total skin excluding the genitalia areas was performed. The exam included the scalp, face, neck, bilateral arms, chest, back, abdomen, bilateral legs, digits, mons pubis, buttocks, and nails.   - Musa IV.  - Brown macules with central hypopigmentation that dimples with lateral pressure involving the right posterior shoulder and legs, consistent with dermatofibromas.   - Multiple tan/brown macules and papules scattered throughout exam, consistent with benign nevi. No concerning features on dermoscopy.   - Scattered waxy, stuck on appearing papules and patches, consistent with seborrheic keratoses.      Medications:  Current Outpatient Medications   Medication Sig Dispense Refill    alcohol swab prep pads Use to swab area of injection/davida as directed. 300 each 6    amLODIPine (NORVASC) 5 MG tablet Take 1 tablet (5 mg) by mouth daily 90 tablet 3    aspirin 81 MG EC tablet Take 81 mg by mouth every evening      atorvastatin (LIPITOR) 40 MG tablet Take 1 tablet (40 mg) by mouth every evening. 30 tablet 11    Biotin 10 MG CAPS Take 10 mg by mouth daily      blood glucose (ACCU-CHEK FASTCLIX) lancing device USE WITH LANCETS 1 kit 0    blood glucose (NO BRAND SPECIFIED) test strip Use to test blood sugar 3 times daily or as directed. Any covered brand that works with meter. 300 strip 1    blood glucose monitoring (ACCU-CHEK FASTCLIX) lancets USE TO TEST 3 TIMES DAILY OR AS DIRECTED. 300 each 1    blood glucose monitoring (SOFTCLIX) lancets Use to test blood sugar 1-2 times daily. 250 each 5    blood glucose monitoring (SOFTCLIX) lancets Use to test blood sugar 3 times daily. 300 each 6    Blood Glucose Monitoring Suppl (ACCU-CHEK GUIDE) w/Device KIT USE TO TEST BLOOD SUGAR THREE TIMES DAILY AS DIRECTED.      cyanocobalamin (VITAMIN B-12) 500 MCG tablet Take 500 mcg by mouth daily      empagliflozin  (JARDIANCE) 10 MG TABS tablet Take 1 tablet (10 mg) by mouth daily 90 tablet 2    loratadine (CLARITIN) 10 MG tablet Take 10 mg by mouth every evening Patient takes at bedtime      magnesium oxide (MAG-OX) 400 MG tablet Take 1 tablet (400 mg) by mouth 2 times daily. 180 tablet 3    metFORMIN (GLUCOPHAGE XR) 500 MG 24 hr tablet TAKE 4 TABLETS BY MOUTH EVERY MORNING. Please do NOT fill today 1/10/2025. 360 tablet 3    Multiple Vitamins-Minerals (HAIR SKIN NAILS PO) Take by mouth every morning      mycophenolate (GENERIC EQUIVALENT) 250 MG capsule Take 2 capsules (500 mg) by mouth 2 times daily. 120 capsule 11    OMEPRAZOLE PO Take 20 mg by mouth every morning      tacrolimus (GENERIC EQUIVALENT) 0.5 MG capsule Take 1 capsule (0.5 mg) by mouth 2 times daily 180 capsule 3    thin (NO BRAND SPECIFIED) lancets Use with lanceting device 3x daily. Any covered brand that works with lancing device. 300 each 1     No current facility-administered medications for this visit.     Facility-Administered Medications Ordered in Other Visits   Medication Dose Route Frequency Provider Last Rate Last Admin    diatrizoate meglumine-sodium (GASTROGRAFIN/GASTROVIEW) 66-10 % solution 480 mL  480 mL Rectal Once Christopher Baker MD          Past Medical History:   Patient Active Problem List   Diagnosis    GERD (gastroesophageal reflux disease)    Allergic rhinitis    Anemia in chronic renal disease    Dyslipidemia    MGUS (monoclonal gammopathy of unknown significance)    Ascending aortic aneurysm    Heart replaced by transplant (H)    Immunosuppressed status    IPMN (intraductal papillary mucinous neoplasm)    Kidney replaced by transplant    Aftercare following organ transplant    HTN, kidney transplant related    Secondary renal hyperparathyroidism    Vitamin D deficiency    Need for pneumocystis prophylaxis    Erectile dysfunction, unspecified erectile dysfunction type    Nocturia    History of hernia repair    Decreased hearing,  unspecified laterality    Overactive bladder    Diabetes mellitus, type 2 (H)    Hypercalcemia    CKD (chronic kidney disease) stage 2, GFR 60-89 ml/min    Acute deep vein thrombosis (DVT) of distal vein of right lower extremity (H)    Encounter for long-term (current) use of medications    Status post coronary angiogram     Past Medical History:   Diagnosis Date    (HFpEF) heart failure with preserved ejection fraction (H)     Allergic rhinitis, cause unspecified     Anemia of chronic kidney failure     Aortic stenosis 08/13/2008    Overview:  Bicuspid aortic valve    AS (aortic stenosis)     Ascending aortic aneurysm     Bicuspid aortic valve     Bilateral hand pain 06/01/2021    CAD (coronary artery disease)     Congestive heart failure, unspecified     Coronary artery disease involving native artery of transplanted heart without angina pectoris 07/10/2014    Dialysis patient     Tues-Thur-Sat    Dyslipidemia     Esophageal reflux     ESRD (end stage renal disease) (H)     Hearing problem     Heart replaced by transplant (H) 12/10/2018    Hypersomnia with sleep apnea, unspecified     Hypertension     Ileostomy status (H)     Immunosuppression     MGUS (monoclonal gammopathy of unknown significance)     Mitral regurgitation     SHEELA (obstructive sleep apnea)     No CPAP    Pneumonia     Sigmoid diverticulitis 08/14/2018    Status post coronary angiogram 06/28/2019    Systolic heart failure (H)     Type 2 diabetes mellitus (H)     Ventral hernia without obstruction or gangrene        CC Referred Self, MD  No address on file on close of this encounter.

## 2025-07-22 ENCOUNTER — OFFICE VISIT (OUTPATIENT)
Dept: ENDOCRINOLOGY | Facility: CLINIC | Age: 70
End: 2025-07-22
Payer: MEDICARE

## 2025-07-22 ENCOUNTER — OFFICE VISIT (OUTPATIENT)
Dept: DERMATOLOGY | Facility: CLINIC | Age: 70
End: 2025-07-22
Payer: MEDICARE

## 2025-07-22 VITALS
SYSTOLIC BLOOD PRESSURE: 124 MMHG | WEIGHT: 174 LBS | BODY MASS INDEX: 26.46 KG/M2 | HEART RATE: 82 BPM | DIASTOLIC BLOOD PRESSURE: 74 MMHG | OXYGEN SATURATION: 98 %

## 2025-07-22 DIAGNOSIS — Z94.0 KIDNEY REPLACED BY TRANSPLANT: ICD-10-CM

## 2025-07-22 DIAGNOSIS — E11.29 TYPE 2 DIABETES MELLITUS WITH OTHER DIABETIC KIDNEY COMPLICATION, WITHOUT LONG-TERM CURRENT USE OF INSULIN (H): ICD-10-CM

## 2025-07-22 DIAGNOSIS — D22.9 MULTIPLE NEVI: ICD-10-CM

## 2025-07-22 DIAGNOSIS — Z12.83 ENCOUNTER FOR SCREENING FOR MALIGNANT NEOPLASM OF SKIN: Primary | ICD-10-CM

## 2025-07-22 DIAGNOSIS — L81.4 LENTIGINES: ICD-10-CM

## 2025-07-22 DIAGNOSIS — E11.22 TYPE 2 DIABETES MELLITUS WITH CHRONIC KIDNEY DISEASE, WITHOUT LONG-TERM CURRENT USE OF INSULIN, UNSPECIFIED CKD STAGE (H): Primary | ICD-10-CM

## 2025-07-22 DIAGNOSIS — L82.1 SEBORRHEIC KERATOSES: ICD-10-CM

## 2025-07-22 LAB
EST. AVERAGE GLUCOSE BLD GHB EST-MCNC: 160 MG/DL
HBA1C MFR BLD: 7.2 %

## 2025-07-22 RX ORDER — LANCETS
EACH MISCELLANEOUS
Qty: 300 EACH | Refills: 6 | Status: SHIPPED | OUTPATIENT
Start: 2025-07-22

## 2025-07-22 ASSESSMENT — PAIN SCALES - GENERAL
PAINLEVEL_OUTOF10: NO PAIN (0)
PAINLEVEL_OUTOF10: NO PAIN (0)

## 2025-07-22 NOTE — CONFIDENTIAL NOTE
HPI  Murray Nicholson is a 70 year old male with type 2 diabetes mellitus.  Clinic visit for diabetes follow up today.  Last visit with me was in January 2025.  Murray gives a hx of type 2 diabetes > 20 years complicated by ESRD- s/p kidney transplant in Dec 2019.   Pt denies known hx of retinopathy or sx of neuropathy.  He has hx CAD- s/p heart transplant in June 2018.  Pt also has hx of HTN, hyperlipidemia, ascending aortic aneurysm and GERD.  For his diabetes, he is currently taking Jardiance 10 mg each am and Metformin 500 mg 4 tabs in am.  Pt's A1C is 7.2 % today.  Previous A1C 6.9 % in February 2025.  He provided me with a few blood sugar readings today which I reviewed with him today.  Most of these blood sugar values are fasting and in the 140-155 range most days.  On ROS today, he continues to work on making healthy food choices and remains active.    Denies blurred vision, n/v, SOB at rest, cough, fever, chest pain or abd pain.  No diarrhea, dysuria or hematuria.  He denies sx of neuropathy or foot ulcers.  No sx of groin yeast infection.    Diabetes Care  Retinopathy: None. pt seen by Oph in Fall 2024 without retinopathy.  Nephropathy:hx of ESRD s/p kidney transplant in 12/2019. Urine protein negative in  Nov 2024.  Neuropathy: None.  Foot Exam: No ulcers.  Taking aspirin: Yes.  Lipids: LDL 62 in June 2024. Pt taking Lipitor.  Insulin: None.  DM meds: Metformin and Jardiance.  Testing: glucose meter.    ROS  See under HPI.    Allergies  Allergies   Allergen Reactions    Cats Shortness Of Breath and Anaphylaxis    Penicillins Hives    Shrimp Shortness Of Breath and Anaphylaxis    Isosorbide Nitrate      hypotension    Norco [Hydrocodone-Acetaminophen] Nausea and Vomiting       Medications  Current Outpatient Medications   Medication Sig Dispense Refill    alcohol swab prep pads Use to swab area of injection/davida as directed. 300 each 6    amLODIPine (NORVASC) 5 MG tablet Take 1 tablet (5 mg) by mouth daily 90  tablet 3    aspirin 81 MG EC tablet Take 81 mg by mouth every evening      atorvastatin (LIPITOR) 40 MG tablet Take 1 tablet (40 mg) by mouth every evening. 30 tablet 11    Biotin 10 MG CAPS Take 10 mg by mouth daily      blood glucose (ACCU-CHEK FASTCLIX) lancing device USE WITH LANCETS 1 kit 0    blood glucose (NO BRAND SPECIFIED) test strip Use to test blood sugar 3 times daily or as directed. Any covered brand that works with meter. 300 strip 1    blood glucose monitoring (ACCU-CHEK FASTCLIX) lancets USE TO TEST 3 TIMES DAILY OR AS DIRECTED. 300 each 1    blood glucose monitoring (SOFTCLIX) lancets Use to test blood sugar 1-2 times daily. 250 each 5    blood glucose monitoring (SOFTCLIX) lancets Use to test blood sugar 3 times daily. 300 each 6    Blood Glucose Monitoring Suppl (ACCU-CHEK GUIDE) w/Device KIT USE TO TEST BLOOD SUGAR THREE TIMES DAILY AS DIRECTED.      cyanocobalamin (VITAMIN B-12) 500 MCG tablet Take 500 mcg by mouth daily      empagliflozin (JARDIANCE) 10 MG TABS tablet Take 1 tablet (10 mg) by mouth daily 90 tablet 2    loratadine (CLARITIN) 10 MG tablet Take 10 mg by mouth every evening Patient takes at bedtime      magnesium oxide (MAG-OX) 400 MG tablet Take 1 tablet (400 mg) by mouth 2 times daily. 180 tablet 3    metFORMIN (GLUCOPHAGE XR) 500 MG 24 hr tablet TAKE 4 TABLETS BY MOUTH EVERY MORNING. Please do NOT fill today 1/10/2025. 360 tablet 3    Multiple Vitamins-Minerals (HAIR SKIN NAILS PO) Take by mouth every morning      mycophenolate (GENERIC EQUIVALENT) 250 MG capsule Take 2 capsules (500 mg) by mouth 2 times daily. 120 capsule 11    OMEPRAZOLE PO Take 20 mg by mouth every morning      tacrolimus (GENERIC EQUIVALENT) 0.5 MG capsule Take 1 capsule (0.5 mg) by mouth 2 times daily 180 capsule 3    thin (NO BRAND SPECIFIED) lancets Use with lanceting device 3x daily. Any covered brand that works with lancing device. 300 each 1       Family History  family history includes C.A.D. in his  father; Cerebrovascular Disease in his father; Diabetes in his father; Hypertension in his father.    Social History   reports that he quit smoking about 30 years ago. His smoking use included cigarettes. He started smoking about 41 years ago. He has a 20 pack-year smoking history. He has never used smokeless tobacco. He reports current alcohol use. He reports that he does not use drugs.     Past Medical History  Past Medical History:   Diagnosis Date    (HFpEF) heart failure with preserved ejection fraction (H)     Allergic rhinitis, cause unspecified     Anemia of chronic kidney failure     Aortic stenosis 08/13/2008    Overview:  Bicuspid aortic valve    AS (aortic stenosis)     Ascending aortic aneurysm     Bicuspid aortic valve     Bilateral hand pain 06/01/2021    CAD (coronary artery disease)     Congestive heart failure, unspecified     Coronary artery disease involving native artery of transplanted heart without angina pectoris 07/10/2014    Dialysis patient     Tues-Thur-Sat    Dyslipidemia     Esophageal reflux     ESRD (end stage renal disease) (H)     Hearing problem     Heart replaced by transplant (H) 12/10/2018    Hypersomnia with sleep apnea, unspecified     Hypertension     Ileostomy status (H)     Immunosuppression     MGUS (monoclonal gammopathy of unknown significance)     Mitral regurgitation     SHEELA (obstructive sleep apnea)     No CPAP    Pneumonia     Sigmoid diverticulitis 08/14/2018    Status post coronary angiogram 06/28/2019    Systolic heart failure (H)     Type 2 diabetes mellitus (H)     Ventral hernia without obstruction or gangrene        Past Surgical History:   Procedure Laterality Date    BIOPSY      CARDIAC SURGERY      COLONOSCOPY N/A 5/3/2018    Procedure: COLONOSCOPY;  colonoscopy ;  Surgeon: Ammon Castillo MD;  Location:  GI    COLONOSCOPY N/A 5/26/2023    Procedure: COLONOSCOPY, WITH POLYPECTOMY via bx forceps;  Surgeon: Francisco Balnd MD;  Location:  GI     CREATE FISTULA ARTERIOVENOUS UPPER EXTREMITY BOVINE Left 5/8/2019    Procedure: Left Upper Extremity Arteriovenous Bovine Graft Creation;  Surgeon: Calin Cheney MD;  [facility]    CV CORONARY ANGIOGRAM N/A 6/28/2019    Procedure: CV CORONARY ANGIOGRAM;  Surgeon: Montrell Posada MD;  [facility]    CV CORONARY ANGIOGRAM N/A 7/15/2020    Procedure: CV CORONARY ANGIOGRAM;  Surgeon: Marcelo Ramírez MD;  [facility]    CV CORONARY ANGIOGRAM N/A 6/7/2021    Procedure: CV CORONARY ANGIOGRAM;  Surgeon: Gilberto Mccann MD;  [facility]    CV CORONARY ANGIOGRAM N/A 6/13/2022    Procedure: Coronary Angiogram;  Surgeon: Marcelo Ramírez MD;  [facility]    CV CORONARY ANGIOGRAM N/A 7/15/2024    Procedure: Coronary Angiogram;  Surgeon: Jordan Fox MD;  [facility]    CV HEART BIOPSY N/A 2/1/2019    Procedure: HBX;  Surgeon: Montrell Posada MD;  [facility]    CV HEART BIOPSY N/A 3/22/2019    Procedure: HBX, RIJV ACCESS;  Surgeon: Jordan Fox MD;  [facility]    CV HEART BIOPSY N/A 6/28/2019    Procedure: CV HEART BIOPSY;  Surgeon: Montrell Posada MD;  [facility]    CV HEART BIOPSY N/A 10/28/2019    Procedure: CV HEART BIOPSY;  Surgeon: Marcelo Ramírez MD;  [facility]    CV HEART BIOPSY N/A 2/6/2020    Procedure: CV HEART BIOPSY;  Surgeon: Montrell Posada MD;  [facility]    CV HEART BIOPSY N/A 7/15/2020    Procedure: CV HEART BIOPSY;  Surgeon: Marcelo Ramírez MD;  Location:  HEART CARDIAC CATH LAB    CV RIGHT HEART CATH MEASUREMENTS RECORDED N/A 6/28/2019    Procedure: CV RIGHT HEART CATH;  Surgeon: Montrell Posada MD;  [facility]    CV RIGHT HEART CATH MEASUREMENTS RECORDED N/A 7/15/2020     Procedure: CV RIGHT HEART CATH;  Surgeon: Marcelo Ramírez MD;  Location:  HEART CARDIAC CATH LAB    CV RIGHT HEART CATH MEASUREMENTS RECORDED N/A 6/7/2021    Procedure: CV RIGHT HEART CATH;  Surgeon: Gilberto Mccann MD;  Location:  HEART CARDIAC CATH LAB    CV RIGHT HEART CATH MEASUREMENTS RECORDED N/A 6/13/2022    Procedure: Right Heart Catheterization;  Surgeon: Marcelo Ramírez MD;  Location:  HEART CARDIAC CATH LAB    CV RIGHT HEART CATH MEASUREMENTS RECORDED N/A 7/15/2024    Procedure: Right Heart Catheterization;  Surgeon: Jordan Fox MD;  Location:  HEART CARDIAC CATH LAB    ENDOSCOPIC ULTRASOUND UPPER GASTROINTESTINAL TRACT (GI) N/A 11/8/2021    Procedure: ENDOSCOPIC ULTRASOUND, ESOPHAGOSCOPY / UPPER GASTROINTESTINAL TRACT (GI)  EUS with FNA;  Surgeon: Alon Don MD;  Location: [facility]    ESOPHAGOSCOPY, GASTROSCOPY, DUODENOSCOPY (EGD), COMBINED N/A 5/7/2018    Procedure: COMBINED ENDOSCOPIC ULTRASOUND, ESOPHAGOSCOPY, GASTROSCOPY, DUODENOSCOPY (EGD), FINE NEEDLE ASPIRATE/BIOPSY;  Endoscopic Ultrasound with Fine Needle Aspiration ;  Surgeon: Alon Don MD;  [facility]    EXAM UNDER ANESTHESIA RECTUM N/A 8/12/2018    Procedure: EXAM UNDER ANESTHESIA RECTUM;  EXAM UNDER ANESTHESIA RECTUM ,COMBINED INCISION AND DRAINAGE OF RECTAL ABCESS ;  Surgeon: Rick Tran MD;  [facility]    HERNIORRHAPHY VENTRAL N/A 6/24/2022    Procedure: open mesh repair, incisional ventral hernia;  Surgeon: Shaheed Curtis MD;  [facility]    INCISION AND DRAINAGE RECTUM, COMBINED N/A 8/12/2018    Procedure: COMBINED INCISION AND DRAINAGE RECTUM;;  Surgeon: Rick Tran MD;  [facility]    IR DIALYSIS FISTULOGRAM LEFT  9/25/2019    IR DIALYSIS FISTULOGRAM LEFT  11/22/2019    IR DIALYSIS PTA  9/25/2019    IR DIALYSIS PTA  11/22/2019    LAPAROSCOPIC ASSISTED COLOSTOMY TAKEDOWN N/A 12/11/2018    Procedure: Laparoscopic Assisted Colostomy  Takedown, Laparoscopic Lysis of Adhesions;  Surgeon: Rick Tran MD;  [facility]    LAPAROSCOPIC ASSISTED SIGMOID COLECTOMY N/A 8/14/2018    Procedure: LAPAROSCOPIC ASSISTED SIGMOID COLECTOMY;  Laparoscopic Hand Assisted Takedown of Splenic Flexure, Sigmoidectomy, Small Bowel Resection, Takedown of Small Bowel to Colon Fistula;  Surgeon: Rick Tran MD;  [facility]    LAPAROSCOPIC HERNIORRHAPHY INGUINAL BILATERAL Bilateral 7/24/2015    Procedure: LAPAROSCOPIC HERNIORRHAPHY INGUINAL BILATERAL;  Surgeon: Bobby Mcconnell MD;  [facility]    LAPAROSCOPIC INSERTION CATHETER PERITONEAL DIALYSIS N/A 6/22/2017    Procedure: LAPAROSCOPIC INSERTION CATHETER PERITONEAL DIALYSIS;  Laparoscopic Peritoneal Dialysis Catheter Placement - Anesthesia with block;  Surgeon: Esteban Arvizu MD;  [facility]    PICC INSERTION Left 04/22/2018    5Fr - 49cm (3cm external), Basilic vein, low SVC    REMOVE CATHETER PERITONEAL Right 1/15/2018    Procedure: REMOVE CATHETER PERITONEAL;  Open Removal of Peritoneal Dialysis Catheter ;  Surgeon: Esteban Arvizu MD;  [facility]    SIGMOIDOSCOPY FLEXIBLE N/A 11/21/2018    Procedure: Examination Under Anesthesia, Flexible Sigmoidoscopy and Polypectomy;  Surgeon: Rick Tran MD;  [facility]    SIGMOIDOSCOPY FLEXIBLE N/A 12/11/2018    Procedure: Flexible Sigmoidoscopy;  Surgeon: Rick Tran MD;  [facility]    TAKEDOWN ILEOSTOMY N/A 3/27/2019    Procedure: Takedown Ileostomy;  Surgeon: Rick Tran MD;  [facility]    TRANSPLANT HEART RECIPIENT N/A 6/14/2018    Procedure: TRANSPLANT HEART RECIPIENT;  Median Sternotomy, on-pump oxygenator, Heart Transplant;  Surgeon: Rony Caputo MD;  [facility]    TRANSPLANT KIDNEY RECIPIENT LIVING UNRELATED  12/2019       Physical Exam    BP (!) 141/90   Pulse 82   Wt 78.9 kg (174 lb)   SpO2 98%   BMI 26.46 kg/m       Recheck BP manually  124/74.  FEET: no ulcers. Normal monofilamentous exam today.      RESULTS  Creatinine   Date Value Ref Range Status   02/17/2025 0.95 0.67 - 1.17 mg/dL Final   02/17/2025 0.94 0.67 - 1.17 mg/dL Final   07/06/2021 1.09 0.66 - 1.25 mg/dL Final     GFR Estimate   Date Value Ref Range Status   02/17/2025 86 >60 mL/min/1.73m2 Final     Comment:     eGFR calculated using 2021 CKD-EPI equation.   02/17/2025 87 >60 mL/min/1.73m2 Final     Comment:     eGFR calculated using 2021 CKD-EPI equation.   07/06/2021 70 >60 mL/min/[1.73_m2] Final     Comment:     Non  GFR Calc  Starting 12/18/2018, serum creatinine based estimated GFR (eGFR) will be   calculated using the Chronic Kidney Disease Epidemiology Collaboration   (CKD-EPI) equation.       Hemoglobin A1C   Date Value Ref Range Status   02/17/2025 6.9 (H) <5.7 % Final     Comment:     Normal <5.7%   Prediabetes 5.7-6.4%    Diabetes 6.5% or higher     Note: Adopted from ADA consensus guidelines.   06/07/2021 6.2 (H) 0 - 5.6 % Final     Comment:     Normal <5.7% Prediabetes 5.7-6.4%  Diabetes 6.5% or higher - adopted from ADA   consensus guidelines.       Potassium   Date Value Ref Range Status   02/17/2025 4.2 3.4 - 5.3 mmol/L Final   08/09/2022 3.7 3.4 - 5.3 mmol/L Final   07/06/2021 3.9 3.4 - 5.3 mmol/L Final     ALT   Date Value Ref Range Status   06/12/2024 17 0 - 70 U/L Final     Comment:     Reference intervals for this test were updated on 6/12/2023 to more accurately reflect our healthy population. There may be differences in the flagging of prior results with similar values performed with this method. Interpretation of those prior results can be made in the context of the updated reference intervals.     06/07/2021 28 0 - 70 U/L Final     AST   Date Value Ref Range Status   06/12/2024 24 0 - 45 U/L Final     Comment:     Reference intervals for this test were updated on 6/12/2023 to more accurately reflect our healthy population. There may be  differences in the flagging of prior results with similar values performed with this method. Interpretation of those prior results can be made in the context of the updated reference intervals.   06/07/2021 27 0 - 45 U/L Final     TSH   Date Value Ref Range Status   09/07/2021 1.89 0.40 - 4.00 mU/L Final   04/16/2018 2.25 0.40 - 4.00 mU/L Final       Cholesterol   Date Value Ref Range Status   06/12/2024 148 <200 mg/dL Final   01/29/2024 147 <200 mg/dL Final   06/07/2021 120 <200 mg/dL Final   12/09/2020 116 <200 mg/dL Final     HDL Cholesterol   Date Value Ref Range Status   06/07/2021 49 >39 mg/dL Final   12/09/2020 47 >39 mg/dL Final     Direct Measure HDL   Date Value Ref Range Status   06/12/2024 51 >=40 mg/dL Final   01/29/2024 47 >=40 mg/dL Final     LDL Cholesterol Calculated   Date Value Ref Range Status   06/12/2024 62 <=100 mg/dL Final   01/29/2024 50 <=100 mg/dL Final   06/07/2021 28 <100 mg/dL Final     Comment:     Desirable:       <100 mg/dl   12/09/2020 26 <100 mg/dL Final     Comment:     Desirable:       <100 mg/dl     Triglycerides   Date Value Ref Range Status   06/12/2024 176 (H) <150 mg/dL Final   01/29/2024 248 (H) <150 mg/dL Final   06/07/2021 215 (H) <150 mg/dL Final     Comment:     Borderline high:  150-199 mg/dl  High:             200-499 mg/dl  Very high:       >499 mg/dl     12/09/2020 214 (H) <150 mg/dL Final     Comment:     Borderline high:  150-199 mg/dl  High:             200-499 mg/dl  Very high:       >499 mg/dl       Cholesterol/HDL Ratio   Date Value Ref Range Status   08/10/2015 5.0 0.0 - 5.0 Final   04/09/2015 4.0 0.0 - 5.0 Final         ASSESSMENT/PLAN:    1.  TYPE 2 DIABETES MELLITUS: Patient's blood sugar values are higher.  Increase Jardiance 25 mg each am and Metformin 500 mg 2 tablets each am and 2 tablets each pm.  Again, I reviewed the possible side effects of Jardiance including dehydration, UTI and groin yeast infection.  Also reviewed the cardiovascular and renal  benefits of taking a SGLT-2 drug.  Reminded Murray to drink plenty of water.  Most recent creat 0.94 with GFR 87 mL/min in February 2025.  Pt continues to eat healthy and exercising.  I asked pt to check his FBS each am and 2 hr postmeal blood sugar.  Pt seen by Oph without retinopathy.  No sx of diabetic peripheral neuropathy and normal foot exam today.    2.  HX OF ESRD: S/P kidney transplant in Dec 2019.  Most recent creat 0.94 with GFR 87 mL/min in February 2025.    3.  CARDIAC: S/P heart transplant in 6/2018.    4.  LIPIDS: LDL 62 in June 2024. Pt taking Lipitor.    5. HTN: /90 today. Recheck BP manually at end of visit today was 124/74.  BP readings done at home show diastolic in 90 range most days. He has an appt with his PCP in 2 days and will discuss his HTN.    6.  FOLLOW UP: With me in 6 months.        Time spent reviewing chart, labs and glucose data today =5  minutes.  Time for clinic visit today =20  minutes.  Time for documentation today = 10 minutes.    Total time for visit today = 35 minutes.    Rosa Calvert PA-C

## 2025-07-22 NOTE — PATIENT INSTRUCTIONS
Patient Education        Proper skin care from [facility]:     -Eliminate harsh soaps as they strip the natural oils from the skin, often resulting in dry itchy skin ( i.e. Dial, Zest, Persian Spring)  -Use mild soaps such as Cetaphil or Dove Sensitive Skin in the shower. You do not need to use soap on arms, legs, and trunk every time you shower unless visibly soiled.   -Avoid hot or cold showers.  -After showering, lightly dry off and apply moisturizing within 2-3 minutes. This will help trap moisture in the skin.   -Aggressive use of a moisturizer at least 1-2 times a day to the entire body (including -Vanicream, Cetaphil, Aquaphor or Cerave) and moisturize hands after every washing.  -We recommend using moisturizers that come in a tub that needs to be scooped out, not a pump. This has more of an oil base. It will hold moisture in your skin much better than a water base moisturizer. The above recommended are non-pore clogging.        Wear a sunscreen with at least SPF 30 on your face, ears, neck and V of the chest daily. Wear sunscreen on other areas of the body if those areas are exposed to the sun throughout the day. Sunscreens can contain physical and/or chemical blockers. Physical blockers are less likely to clog pores, these include zinc oxide and titanium dioxide. Reapply every two hour and after swimming.      Sunscreen examples: https://www.ewg.org/sunscreen/     UV radiation  UVA radiation remains constant throughout the day and throughout the year. It is a longer wavelength than UVB and therefore penetrates deeper into the skin leading to immediate and delayed tanning, photoaging, and skin cancer. 70-80% of UVA and UVB radiation occurs between the hours of 10am-2pm.  UVB radiation  UVB radiation causes the most harmful effects and is more significant during the summer months. However, snow and ice can reflect UVB radiation leading to skin damage during the winter months as well. UVB radiation is  responsible for tanning, burning, inflammation, delayed erythema (pinkness), pigmentation (brown spots), and skin cancer.      I recommend self monthly full body exams and yearly full body exams with a dermatology provider. If you develop a new or changing lesion please follow up for examination. Most skin cancers are pink and scaly or pink and pearly. However, we do see blue/brown/black skin cancers.  Consider the ABCDEs of melanoma when giving yourself your monthly full body exam ( don't forget the groin, buttocks, feet, toes, etc). A-asymmetry, B-borders, C-color, D-diameter, E-elevation or evolving. If you see any of these changes please follow up in clinic. If you cannot see your back I recommend purchasing a hand held mirror to use with a larger wall mirror.       Checking for Skin Cancer  You can find cancer early by checking your skin each month. There are 3 kinds of skin cancer. They are melanoma, basal cell carcinoma, and squamous cell carcinoma. Doing monthly skin checks is the best way to find new marks or skin changes. Follow the instructions below for checking your skin.   The ABCDEs of checking moles for melanoma   Check your moles or growths for signs of melanoma using ABCDE:   Asymmetry: the sides of the mole or growth don t match  Border: the edges are ragged, notched, or blurred  Color: the color within the mole or growth varies  Diameter: the mole or growth is larger than 6 mm (size of a pencil eraser)  Evolving: the size, shape, or color of the mole or growth is changing (evolving is not shown in the images below)    Checking for other types of skin cancer  Basal cell carcinoma or squamous cell carcinoma have symptoms such as:      A spot or mole that looks different from all other marks on your skin  Changes in how an area feels, such as itching, tenderness, or pain  Changes in the skin's surface, such as oozing, bleeding, or scaliness  A sore that does not heal  New swelling or redness beyond  the border of a mole     Who s at risk?  Anyone can get skin cancer. But you are at greater risk if you have:   Fair skin, light-colored hair, or light-colored eyes  Many moles or abnormal moles on your skin  A history of sunburns from sunlight or tanning beds  A family history of skin cancer  A history of exposure to radiation or chemicals  A weakened immune system  If you have had skin cancer in the past, you are at risk for recurring skin cancer.   How to check your skin  Do your monthly skin checkups in front of a full-length mirror. Check all parts of your body, including your:   Head (ears, face, neck, and scalp)  Torso (front, back, and sides)  Arms (tops, undersides, upper, and lower armpits)  Hands (palms, backs, and fingers, including under the nails)  Buttocks and genitals  Legs (front, back, and sides)  Feet (tops, soles, toes, including under the nails, and between toes)  If you have a lot of moles, take digital photos of them each month. Make sure to take photos both up close and from a distance. These can help you see if any moles change over time.   Most skin changes are not cancer. But if you see any changes in your skin, call your doctor right away. Only he or she can diagnose a problem. If you have skin cancer, seeing your doctor can be the first step toward getting the treatment that could save your life.   [employer] last reviewed this educational content on 4/1/2019 2000-2020 [employer]. [address] [place] [address], [place] [address]. All rights reserved. This information is not intended as a substitute for professional medical care. Always follow your healthcare professional's instructions.        When should I call my doctor?  If you are worsening or not improving, please, contact us or seek urgent care as noted below.      Who should I call with questions (adults)?  [facility] (adult and pediatric): [phone]  [facility]  [place] (adult): [phone]  [facility] ([place], [place], [place] and [place]) [phone]  For urgent needs outside of business hours call the [facility] at [phone] and ask for the dermatology resident on call to be paged  If this is a medical emergency and you are unable to reach an ER, Call 911        If you need a prescription refill, please contact your pharmacy. Refills are approved or denied by our Physicians during normal business hours, Monday through Fridays  Per office policy, refills will not be granted if you have not been seen within the past year (or sooner depending on your child's condition)

## 2025-07-22 NOTE — LETTER
7/22/2025       RE: Murray Nicholson  [address]  [place] [address]     Dear Colleague,    Thank you for referring your patient, Murray Nicholson, to the [facility]. Please see a copy of my visit note below.    7/22/25 155   7/21/25 155   7/20/25 145   7/19/25 143   7/18/25 136   7/16/25 141   7/15/25 167        Again, thank you for allowing me to participate in the care of your patient.      Sincerely,    Rosa Calvert PA-C

## 2025-07-22 NOTE — LETTER
7/22/2025       RE: Murray Nicholson  [address]  [place] [address]     Dear Colleague,    Thank you for referring your patient, Murray Nicholson, to the [facility] [place] at [facility]. Please see a copy of my visit note below.    [facility] Dermatology Note  Encounter Date: Jul 22, 2025  Office Visit     Reviewed patient's past medical history and pertinent chart review prior to patient's visit today.     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 We discussed the importance of self exams at home.   - ABCDEs: Counseled ABCDEs of melanoma: Asymmetry, Border (irregularity), Color (not uniform, changes in color), Diameter (greater than 6 mm which is about the size of a pencil eraser), and Evolving (any changes in preexisting moles).  - Sun protection: Counseled SPF 30+ sunscreen, UPF clothing, sun avoidance, tanning bed avoidance.    # Dermatofibromas   # Seborrheic keratoses  - We discussed the benign nature of the skin lesions. No treatment required. Continued observation recommended. Follow up with any concerns.      Follow-up:  Annual for follow up full body skin exam, as needed for new or changing lesions or new concerns    All risks, benefits and alternatives were discussed with patient.  Patient is in agreement and understands the assessment and plan.  All questions were answered.  Talia Hernandez PA-C  [facility] Dermatology    ____________________________________________    CC: Skin Check (Murray is here today for a skin check )    HPI:    Murray Nicholson is a(n) 70 year old male who presents today as a return patient for a full body skin cancer screening. The patient has a history of kidney and heart transplant, chronically immunosuppressed. The patient denies a personal history of skin cancer. No specific cutaneous concerns today. The patient reports trying to be diligent with photoprotection.      Physical Exam:  Vitals: There were no vitals taken for this visit.  SKIN: Total skin excluding the genitalia areas was performed. The exam included the scalp, face, neck, bilateral arms, chest, back, abdomen, bilateral legs, digits, mons pubis, buttocks, and nails.   - Musa IV.  - Brown macules with central hypopigmentation that dimples with lateral pressure involving the right posterior shoulder and legs, consistent with dermatofibromas.   - Multiple tan/brown macules and papules scattered throughout exam, consistent with benign nevi. No concerning features on dermoscopy.   - Scattered waxy, stuck on appearing papules and patches, consistent with seborrheic keratoses.      Medications:  Current Outpatient Medications   Medication Sig Dispense Refill     alcohol swab prep pads Use to swab area of injection/davida as directed. 300 each 6     amLODIPine (NORVASC) 5 MG tablet Take 1 tablet (5 mg) by mouth daily 90 tablet 3     aspirin 81 MG EC tablet Take 81 mg by mouth every evening       atorvastatin (LIPITOR) 40 MG tablet Take 1 tablet (40 mg) by mouth every evening. 30 tablet 11     Biotin 10 MG CAPS Take 10 mg by mouth daily       blood glucose (ACCU-CHEK FASTCLIX) lancing device USE WITH LANCETS 1 kit 0     blood glucose (NO BRAND SPECIFIED) test strip Use to test blood sugar 3 times daily or as directed. Any covered brand that works with meter. 300 strip 1     blood glucose monitoring (ACCU-CHEK FASTCLIX) lancets USE TO TEST 3 TIMES DAILY OR AS DIRECTED. 300 each 1     blood glucose monitoring (SOFTCLIX) lancets Use to test blood sugar 1-2 times  daily. 250 each 5     blood glucose monitoring (SOFTCLIX) lancets Use to test blood sugar 3 times daily. 300 each 6     Blood Glucose Monitoring Suppl (ACCU-CHEK GUIDE) w/Device KIT USE TO TEST BLOOD SUGAR THREE TIMES DAILY AS DIRECTED.       cyanocobalamin (VITAMIN B-12) 500 MCG tablet Take 500 mcg by mouth daily       empagliflozin (JARDIANCE) 10 MG TABS tablet Take 1 tablet (10 mg) by mouth daily 90 tablet 2     loratadine (CLARITIN) 10 MG tablet Take 10 mg by mouth every evening Patient takes at bedtime       magnesium oxide (MAG-OX) 400 MG tablet Take 1 tablet (400 mg) by mouth 2 times daily. 180 tablet 3     metFORMIN (GLUCOPHAGE XR) 500 MG 24 hr tablet TAKE 4 TABLETS BY MOUTH EVERY MORNING. Please do NOT fill today 1/10/2025. 360 tablet 3     Multiple Vitamins-Minerals (HAIR SKIN NAILS PO) Take by mouth every morning       mycophenolate (GENERIC EQUIVALENT) 250 MG capsule Take 2 capsules (500 mg) by mouth 2 times daily. 120 capsule 11     OMEPRAZOLE PO Take 20 mg by mouth every morning       tacrolimus (GENERIC EQUIVALENT) 0.5 MG capsule Take 1 capsule (0.5 mg) by mouth 2 times daily 180 capsule 3     thin (NO BRAND SPECIFIED) lancets Use with lanceting device 3x daily. Any covered brand that works with lancing device. 300 each 1     No current facility-administered medications for this visit.     Facility-Administered Medications Ordered in Other Visits   Medication Dose Route Frequency Provider Last Rate Last Admin     diatrizoate meglumine-sodium (GASTROGRAFIN/GASTROVIEW) 66-10 % solution 480 mL  480 mL Rectal Once Christopher Baker MD          Past Medical History:   Patient Active Problem List   Diagnosis     GERD (gastroesophageal reflux disease)     Allergic rhinitis     Anemia in chronic renal disease     Dyslipidemia     MGUS (monoclonal gammopathy of unknown significance)     Ascending aortic aneurysm     Heart replaced by transplant (H)     Immunosuppressed status     IPMN (intraductal  papillary mucinous neoplasm)     Kidney replaced by transplant     Aftercare following organ transplant     HTN, kidney transplant related     Secondary renal hyperparathyroidism     Vitamin D deficiency     Need for pneumocystis prophylaxis     Erectile dysfunction, unspecified erectile dysfunction type     Nocturia     History of hernia repair     Decreased hearing, unspecified laterality     Overactive bladder     Diabetes mellitus, type 2 (H)     Hypercalcemia     CKD (chronic kidney disease) stage 2, GFR 60-89 ml/min     Acute deep vein thrombosis (DVT) of distal vein of right lower extremity (H)     Encounter for long-term (current) use of medications     Status post coronary angiogram     Past Medical History:   Diagnosis Date     (HFpEF) heart failure with preserved ejection fraction (H)      Allergic rhinitis, cause unspecified      Anemia of chronic kidney failure      Aortic stenosis 08/13/2008    Overview:  Bicuspid aortic valve     AS (aortic stenosis)      Ascending aortic aneurysm      Bicuspid aortic valve      Bilateral hand pain 06/01/2021     CAD (coronary artery disease)      Congestive heart failure, unspecified      Coronary artery disease involving native artery of transplanted heart without angina pectoris 07/10/2014     Dialysis patient     TuesThur-Sat     Dyslipidemia      Esophageal reflux      ESRD (end stage renal disease) (H)      Hearing problem      Heart replaced by transplant (H) 12/10/2018     Hypersomnia with sleep apnea, unspecified      Hypertension      Ileostomy status (H)      Immunosuppression      MGUS (monoclonal gammopathy of unknown significance)      Mitral regurgitation      SHEELA (obstructive sleep apnea)     No CPAP     Pneumonia      Sigmoid diverticulitis 08/14/2018     Status post coronary angiogram 06/28/2019     Systolic heart failure (H)      Type 2 diabetes mellitus (H)      Ventral hernia without obstruction or gangrene        CC Referred Self, MD  No address  on file on close of this encounter.    Again, thank you for allowing me to participate in the care of your patient.      Sincerely,    Talia Hernandez PA-C

## 2025-07-22 NOTE — NURSING NOTE
Chief Complaint   Patient presents with    Diabetes     BP (!) 141/90   Pulse 82   Wt 78.9 kg (174 lb)   SpO2 98%   BMI 26.46 kg/m

## 2025-07-22 NOTE — PROGRESS NOTES
7/22/25 155   7/21/25 155   7/20/25 145   7/19/25 143   7/18/25 136   7/16/25 141   7/15/25 167

## 2025-07-22 NOTE — NURSING NOTE
Dermatology Rooming Note    Murray Nicholson's goals for this visit include:   Chief Complaint   Patient presents with    Skin Check     Murray is here today for a skin check with no concerns      Valentin ESRTADA CMA

## 2025-08-16 DIAGNOSIS — Z94.0 KIDNEY TRANSPLANTED: ICD-10-CM

## 2025-08-16 DIAGNOSIS — Z94.0 KIDNEY REPLACED BY TRANSPLANT: ICD-10-CM

## 2025-08-16 DIAGNOSIS — E87.20 METABOLIC ACIDOSIS: ICD-10-CM

## 2025-08-16 DIAGNOSIS — Z94.1 HEART REPLACED BY TRANSPLANT (H): ICD-10-CM

## 2025-08-18 RX ORDER — TACROLIMUS 0.5 MG/1
0.5 CAPSULE ORAL 2 TIMES DAILY
Qty: 60 CAPSULE | Refills: 11 | Status: SHIPPED | OUTPATIENT
Start: 2025-08-18

## 2025-08-20 ENCOUNTER — PRE VISIT (OUTPATIENT)
Dept: TRANSPLANT | Facility: CLINIC | Age: 70
End: 2025-08-20
Payer: MEDICARE

## 2025-08-20 DIAGNOSIS — Z79.899 ENCOUNTER FOR LONG-TERM (CURRENT) USE OF MEDICATIONS: ICD-10-CM

## 2025-08-20 DIAGNOSIS — Z94.1 HEART REPLACED BY TRANSPLANT (H): Primary | ICD-10-CM

## 2025-08-20 DIAGNOSIS — Z12.5 PROSTATE CANCER SCREENING: ICD-10-CM

## 2025-08-20 DIAGNOSIS — E11.9 DIABETES MELLITUS (H): ICD-10-CM

## (undated) DEVICE — LIGHT HANDLE X1 31140133

## (undated) DEVICE — KIT HAND CONTROL ACIST 014644 AR-P54

## (undated) DEVICE — Device

## (undated) DEVICE — TROCAR FALLOR TUNNELING PERITINEAL DIALYSIS CATH 8888415679

## (undated) DEVICE — SU MONOCRYL 4-0 PS-2 27" UND Y426H

## (undated) DEVICE — PAD CHUX UNDERPAD 23X24" 7136

## (undated) DEVICE — GUIDEWIRE L80CM OD.035IN 3 CM J-TIP V

## (undated) DEVICE — SU MONOCRYL 4-0 RB-1 27" Y214H

## (undated) DEVICE — STPL SKIN 35W 6.9MM  PXW35

## (undated) DEVICE — WIPES FOLEY CARE SURESTEP PROVON DFC100

## (undated) DEVICE — LINEN TOWEL PACK X5 5464

## (undated) DEVICE — SU SILK 3-0 SH CR 8X18" C013D

## (undated) DEVICE — ENDO TROCAR SLEEVE KII ADV FIXATION 05X100MM CFS02

## (undated) DEVICE — SUCTION IRR STRYKERFLOW II W/TIP 250-070-520

## (undated) DEVICE — SPONGE LAP 18X18" X8435

## (undated) DEVICE — SHTH INTRO 0.021IN ID 6FR DIA

## (undated) DEVICE — TUBING INSUFFLATION W/FILTER CPC TO LUER 620-030-301

## (undated) DEVICE — PACK HEART LEFT CUSTOM

## (undated) DEVICE — JELLY LUBRICATING SURGILUBE 2OZ TUBE

## (undated) DEVICE — SOL NACL 0.9% 10ML VIAL 0409-4888-02

## (undated) DEVICE — DRSG GAUZE 4X4" TRAY 6939

## (undated) DEVICE — INSERT FOGARTY 33MM TRACTION HYDRAJAW HYDRA33

## (undated) DEVICE — SU VICRYL 2-0 CT-1 27" UND J259H

## (undated) DEVICE — SU VICRYL 0 CTX 36" J370H

## (undated) DEVICE — INTRO SHEATH 7FRX10CM PINNACLE RSS702

## (undated) DEVICE — CATH ANGIO INFINITI JL5 4FRX100CM 538422

## (undated) DEVICE — INTRO CATH 40CM 7FR FST-CATH

## (undated) DEVICE — SU VICRYL 0 TIE 54" J608H

## (undated) DEVICE — GLOVE PROTEXIS MICRO 7.5  2D73PM75

## (undated) DEVICE — NDL SPINAL 25GA 3.5" QUINCKE 405180

## (undated) DEVICE — BNDG ABDOMINAL BINDER 15X46-62"  08140562

## (undated) DEVICE — SU PDS II 4-0 RB-1 27" Z304H

## (undated) DEVICE — CATH ANGIO INFINITI AL1 4FRX100CM 538445

## (undated) DEVICE — SU ETHIBOND 0 TIE 6X30" X306H

## (undated) DEVICE — SPONGE RAY-TEC 4X8" 7318

## (undated) DEVICE — SOL WATER IRRIG 1000ML BOTTLE 2F7114

## (undated) DEVICE — VALVE HEMOSTASIS .096" COPILOT MECH 1003331

## (undated) DEVICE — ESU GROUND PAD ADULT W/CORD E7507

## (undated) DEVICE — DRAPE LAP W/ARMBOARD 29410

## (undated) DEVICE — SU PROLENE 6-0 RB-2DA 30" 8711H

## (undated) DEVICE — DRAPE LEGGINGS CLEAR 8430

## (undated) DEVICE — TAPE MEDIPORE 4"X2YD 2864

## (undated) DEVICE — DRAPE U SPLIT 74X120" 29440

## (undated) DEVICE — SOL NACL 0.9% IRRIG 1000ML BOTTLE 2F7124

## (undated) DEVICE — PACK RECTAL UMMC

## (undated) DEVICE — LINEN TOWEL PACK X30 5481

## (undated) DEVICE — WIPE PREMOIST CLEANSING WASHCLOTHS 7988

## (undated) DEVICE — SU PROLENE 2-0 SHDA 36" 8523H

## (undated) DEVICE — LINEN GOWN XLG 5407

## (undated) DEVICE — LABEL MEDICATION SYSTEM 3303-P

## (undated) DEVICE — PROTECTOR ARM ONE-STEP TRENDELENBURG 40418

## (undated) DEVICE — GLOVE LINER HALF FINGER NYLON KP5015/50

## (undated) DEVICE — SU PROLENE 3-0 SHDA 48" 8534H

## (undated) DEVICE — INTRO SHEATH 7FRX25CM PINNACLE RSS706

## (undated) DEVICE — SU VICRYL 3-0 SH 27" J316H

## (undated) DEVICE — WIRE GUIDE 0.035"X260CM SAFE-T-J EXCHANGE G00517

## (undated) DEVICE — SURGICEL ABSORBABLE HEMOSTAT SNOW 2"X4" 2082

## (undated) DEVICE — SPONGE SURGIFOAM 100 1974

## (undated) DEVICE — DRAPE IOBAN INCISE 23X17" 6650EZ

## (undated) DEVICE — KIT ENDO FIRST STEP DISINFECTANT 200ML W/POUCH EP-4

## (undated) DEVICE — SOL WATER IRRIG 3000ML BAG 2B7117

## (undated) DEVICE — BLADE SAW STERNAL 20X30MM KM-32

## (undated) DEVICE — PREP CHLORHEXIDINE 4% 4OZ (HIBICLENS) 57504

## (undated) DEVICE — DRAIN JACKSON PRATT RESERVOIR 100ML SU130-1305

## (undated) DEVICE — PACK HEART RIGHT CUSTOM SAN32RHF18

## (undated) DEVICE — INTRO SHEATH MICRO PLATINUM TIP 4FRX40CM 7274

## (undated) DEVICE — INTRO SHEATH 6FRX25CM PINNACLE RSS606

## (undated) DEVICE — ENDO TROCAR FIRST ENTRY KII FIOS ADV FIX 05X100MM CFF03

## (undated) DEVICE — SUCTION MANIFOLD DORNOCH ULTRA CART UL-CL500

## (undated) DEVICE — SYR 50ML LL W/O NDL 309653

## (undated) DEVICE — SUCTION CATH AIRLIFE TRI-FLO W/CONTROL PORT 14FR  T60C

## (undated) DEVICE — SUCTION DRY CHEST DRAIN OASIS 3600-100

## (undated) DEVICE — PACK ADULT HEART UMMC PV15CG92D

## (undated) DEVICE — PANTIES MESH LG/XLG 2PK 706M2

## (undated) DEVICE — ESU ELEC BLADE 6" COATED E1450-6

## (undated) DEVICE — SU DERMABOND ADVANCED .7ML DNX12

## (undated) DEVICE — GOWN LG DISP 9515

## (undated) DEVICE — LINEN TOWEL PACK X6 WHITE 5487

## (undated) DEVICE — SU DERMABOND PRINEO 22CM CLR222US

## (undated) DEVICE — MANIFOLD KIT ANGIO AUTOMATED 014613

## (undated) DEVICE — KIT CONNECTOR FOR OLYMPUS ENDOSCOPES DEFENDO 100310

## (undated) DEVICE — BASIN SET SINGLE STERILE 13752-624

## (undated) DEVICE — PITCHER STERILE 1000ML  SSK9004A

## (undated) DEVICE — SOL ADH LIQUID BENZOIN SWAB 0.6ML C1544

## (undated) DEVICE — SU PROLENE 6-0 RB-2DA 18" 8714H

## (undated) DEVICE — PREP CHLORAPREP 26ML TINTED ORANGE  260815

## (undated) DEVICE — TUBE CULTURE AEROBIC/ANAEROBIC W/O SWABS A.C.T.I.1. 12401

## (undated) DEVICE — DRSG ABDOMINAL 07 1/2X8" 7197D

## (undated) DEVICE — PACK AV FISTULA

## (undated) DEVICE — STPL RELOAD 100 X 4.8MM GIA10048L

## (undated) DEVICE — CLIP APPLIER ENDO 05MM MED/LG 176630

## (undated) DEVICE — ESU HARMONIC ACE LAP SHEARS ETHICON ACE+ 7 5MMX45CM HARH45

## (undated) DEVICE — BARRIER SEPRAFILM 5X6" SINGLE SHEET 4301-02

## (undated) DEVICE — ENDO SHEARS 5MM 176643

## (undated) DEVICE — TUBING PRESSURE 30"

## (undated) DEVICE — DRAIN JACKSON PRATT ROUND SIL 19FR W/TROCAR LF JP-2232

## (undated) DEVICE — ENDO TROCAR FIRST ENTRY KII FIOS ADV FIX 12X100MM CFF73

## (undated) DEVICE — KIT PATIENT POSITIONING PIGAZZI LATEX FREE 40580

## (undated) DEVICE — DRAPE SHEET REV FOLD 3/4 9349

## (undated) DEVICE — RETR ELASTIC STAYS LONE STAR SHARP 5MM 8/PACK 3311-8G

## (undated) DEVICE — STPL RELOAD REG/THK TISSUE ECHELON 60 X 3.8MM GOLD GST60D

## (undated) DEVICE — SU SILK 4-0 RB-1 30" K871H

## (undated) DEVICE — NDL INSUFFLATION 14GA STEP S100000

## (undated) DEVICE — KIT HAND CONTROL ACIST 016795

## (undated) DEVICE — STPL POWERED ECHELON LONG 60MM PLEE60A

## (undated) DEVICE — DRSG STERI STRIP 1/2X4" R1547

## (undated) DEVICE — SU SILK 2-0 TIE 12X30" A305H

## (undated) DEVICE — ENDO ACCESS PLATFORM GELPOINT MINI CNGL3

## (undated) DEVICE — SU ETHILON 2-0 FS 18" 664H

## (undated) DEVICE — CATH FOLEY 3WAY 18FR 30ML LATEX 0167SI18

## (undated) DEVICE — STPL LINEAR CUT 100MM TLC10

## (undated) DEVICE — CATH ANGIO INFINITI AR MOD 4FRX100CM 538448

## (undated) DEVICE — ENDO GELPORT 100/120MM C8XX2

## (undated) DEVICE — CATH TRAY FOLEY SURESTEP 16FR W/URNE MTR STLK LATEX A303316A

## (undated) DEVICE — STPL CIRCULAR ENDOPATH 29MM CVD ECS29A

## (undated) DEVICE — SUCTION TIP YANKAUER STR K87

## (undated) DEVICE — SYR 10ML FINGER CONTROL W/O NDL 309695

## (undated) DEVICE — ENDO TROCAR 05MM VERSAONE BLADELESS W/STD FIX CAN NONB5STF

## (undated) DEVICE — SLEEVE TR BAND RADIAL COMPRESSION DEVICE 24CM TRB24-REG

## (undated) DEVICE — SU SILK 3-0 TIE 12X30" A304H

## (undated) DEVICE — ESU PENCIL W/COATED BLADE E2450H

## (undated) DEVICE — DRAPE POUCH INSTRUMENT 1018

## (undated) DEVICE — STPL 100 X 3.8MM GIA10038S

## (undated) DEVICE — SU PROLENE 1 CTX 30" 8455H

## (undated) DEVICE — TUBING IRRIG CYSTO/BLADDER SET 81" LF 2C4040

## (undated) DEVICE — CATH PD MINICAP TRANSFER SET

## (undated) DEVICE — DRSG DRAIN 4X4" 7086

## (undated) DEVICE — SU PDS II 6-0 RB-2DA 30" Z149H

## (undated) DEVICE — INTRODUCER SHEATH FAST-CATH 7FRX40CM GSPC CVD 406772

## (undated) DEVICE — DRAIN CHEST TUBE 28FR STR 8028

## (undated) DEVICE — SU VICRYL 3-0 SH 8X18" UND J864D

## (undated) DEVICE — SYSTEM CLEARIFY VISUALIZATION 21-345

## (undated) DEVICE — SU SILK 0 SH 30" K834H

## (undated) DEVICE — FASTENER CATH BALLOON CLAMPX2 STATLOCK 0684-00-493

## (undated) DEVICE — PREP POVIDONE IODINE SOLUTION 10% 4OZ

## (undated) DEVICE — SU SILK 0 TIE 6X30" A306H

## (undated) DEVICE — SU MONOCRYL 4-0 PS-2 18" UND Y496G

## (undated) DEVICE — STPL SKIN 35W 059037

## (undated) DEVICE — STPL RELOAD 100 X 3.8MM GIA10038L

## (undated) DEVICE — BARRIER SEPRAFILM 3X5" X6 SHEETS 5086-02

## (undated) DEVICE — GLOVE NEOLON 2G NEOPRENE PF 7.5 LATEX FREE MSG6075

## (undated) DEVICE — SU PDS 6-0 BV-1DA 30" Z117H

## (undated) DEVICE — GOWN IMPERVIOUS BREATHABLE SMART XLG 89045

## (undated) DEVICE — DEVICE SUTURE GRASPER TROCAR CLOSURE 14GA PMITCSG

## (undated) DEVICE — COVER ULTRASOUND PROBE 6X96" 610-833

## (undated) DEVICE — LEAD ELEC MYOCARDIO PACING TEMPORARY MEDTRONIC

## (undated) DEVICE — APPLICATOR COTTON TIP 6"X2 STERILE LF 6012

## (undated) DEVICE — RETR RING LONE STAR 14.1X14.1CM 3307G

## (undated) DEVICE — SU PROLENE 4-0 RB-1DA 36" 8557H

## (undated) DEVICE — SOL NACL 0.9% INJ 1000ML BAG 2B1324X

## (undated) DEVICE — KIT NEW IMAGE COLOSTOMY/ILEOSTOMY 2 3/4" LF 19354

## (undated) DEVICE — SU DERMABOND PRINEO CLR602US

## (undated) DEVICE — SU STEEL 6 CCS 4X18" M654G

## (undated) DEVICE — CATH PD MINICAP

## (undated) DEVICE — BLADE CLIPPER SGL USE 9680

## (undated) DEVICE — ENDO CAP AND TUBING STERILE FOR ENDOGATOR  100130

## (undated) DEVICE — SU PDS II 2-0 CT-1 27" Z339H

## (undated) DEVICE — PUNCH AORTIC 5MM SINGLE USE 1001-626

## (undated) DEVICE — DRSG PRIMAPORE 02X3" 7133

## (undated) DEVICE — ESU HOLSTER PLASTIC DISP E2400

## (undated) DEVICE — CATH ANGIO INFINITI JL4 4FRX100CM 538420

## (undated) DEVICE — SU PROLENE 6-0 BV-1 DA 24" 8805H

## (undated) DEVICE — DRAPE SLUSH/WARMER 66X44" ORS-320

## (undated) DEVICE — DRSG TELFA 3X8" 1238

## (undated) DEVICE — SU PROLENE 5-0 RB-2DA 30" 8710H

## (undated) DEVICE — SU SILK 4-0 TIE 12X30" A303H

## (undated) DEVICE — DRAIN PENROSE 5/8X18" LATEX 0918040

## (undated) DEVICE — SOL NACL 0.9% IRRIG 3000ML BAG 2B7477

## (undated) DEVICE — FORCEP ENDOMYOCARDIAL BIOPSY STRAIGHT 6FRX50CM 190060

## (undated) DEVICE — INTRO GLIDESHEATH SLENDER 6FR 10X45CM 60-1060

## (undated) DEVICE — SUCTION MANIFOLD NEPTUNE 2 SYS 4 PORT 0702-020-000

## (undated) DEVICE — ENDO NDL BEVEL TIP ULTRASOUND 22GA 5.2FR ECHO-1-22

## (undated) DEVICE — ENDO SYSTEM WATER BOTTLE & TUBING W/CO2 FILTER 00711549

## (undated) DEVICE — DRAPE EXTREMITY UPPER 120X76" 29414

## (undated) DEVICE — INTRO SHEATH AVANTI 4FRX23CM 504604T

## (undated) DEVICE — PACK ENDOSCOPY GI CUSTOM UMMC

## (undated) DEVICE — TUBING ANGIOGRAPHY EXCITE NYLON POLYURETHANE 1200 PSI L30 IN

## (undated) DEVICE — SURGICEL ABSORBABLE HEMOSTAT SNOW 4"X4" 2083

## (undated) DEVICE — CATH ANGIO INFINITI 3DRC 4FRX100CM 538476

## (undated) DEVICE — SU UMBILICAL TAPE .125X30" U11T

## (undated) DEVICE — CATH ANGIO INFINITI 3DRC 6FRX100CM 534676T

## (undated) DEVICE — GOWN IMPERVIOUS BREATHABLE SMART LG 89015

## (undated) DEVICE — WIRE GUIDE 0.025"X150CM EMERALD J TIP 502524

## (undated) DEVICE — ESU ENDO SCISSORS 5MM CVD 5DCS

## (undated) DEVICE — GLOVE PROTEXIS BLUE W/NEU-THERA 8.0  2D73EB80

## (undated) DEVICE — ENDO FORCEP BX CAPTURA JUMBO SPIKE 2.8MMX230CM G53042

## (undated) DEVICE — CATH FOLEY 10FR 3-5ML SIL 170003100

## (undated) DEVICE — SYR 01ML 27GA 0.5" NDL TBC 309623

## (undated) DEVICE — ENDO PROBE COVER ULTRASOUND BALLOON LATEX  MAJ-249

## (undated) DEVICE — FORCEP ENDMYCRD BX DISP STR 6F

## (undated) DEVICE — CATH PLUG W/CAP 000076

## (undated) DEVICE — ENDO TROCAR OPTICAL 5X150MM THRD C0Q05

## (undated) DEVICE — TUBING SUCTION 10'X3/16" N510

## (undated) DEVICE — DEFIB PRO-PADZ LVP LQD GEL ADULT 8900-2105-01

## (undated) DEVICE — INTRODUCER SHEATH FAST-CATH 7FRX85CM GSPC CVD 406772

## (undated) DEVICE — CONNECTOR DRAIN CHEST Y EXTENSION SET 19909

## (undated) DEVICE — EYE SPONGE SPEAR WECK CEL 0008685

## (undated) DEVICE — SU SILK 1 TIE 6X30" A307H

## (undated) DEVICE — PREP CHLORAPREP 26ML TINTED HI-LITE ORANGE 930815

## (undated) DEVICE — SYR 10ML LL W/O NDL

## (undated) DEVICE — DRSG TEGADERM 8X12" 1629

## (undated) DEVICE — SU PDS II 0 CT-1 27" Z340H

## (undated) DEVICE — TOURNIQUET VASCULAR KIT ARGYLE 8888-585000

## (undated) DEVICE — BNDG ABDOMINAL BINDER 9X45-62" 79-89071

## (undated) DEVICE — STPL SKIN 35W ROTATING HEAD PRW35

## (undated) DEVICE — CANNULA VESSEL DLP  30001

## (undated) DEVICE — SYR BULB IRRIG 50ML LATEX FREE 0035280

## (undated) DEVICE — DRAIN PENROSE 3/8X18" LATEX 0918020

## (undated) DEVICE — SOL NACL 0.9% INJ 1000ML BAG 07983-09

## (undated) DEVICE — ESU HANDPIECE ABC BEND-A-BEAM 6" 134006

## (undated) DEVICE — CLOSURE ANGIOSEAL 6FR 610130

## (undated) DEVICE — ENDO NDL BALL TIP ULTRASOUND 22GA 5.2FR ECHO-3-22

## (undated) DEVICE — SU PROLENE 6-0 C-1DA 30" 8706H

## (undated) DEVICE — ENDO TROCAR BLADELESS 07-8MM MINISTEP MS101008

## (undated) DEVICE — SU VICRYL 0 CT-1 36" J346H

## (undated) DEVICE — SU PROLENE 5-0 RB-1DA 36"  8556H

## (undated) DEVICE — SU PDS II 4-0 SH 27" Z315H

## (undated) DEVICE — ENDO SCOPE WARMER SEAL  C3101

## (undated) DEVICE — SU SILK 4-0 RB-1 CR 8X18" C054D

## (undated) DEVICE — SU VICRYL 0 UR-6 27" J603H

## (undated) DEVICE — ENDO BITE BLOCK ADULT OMNI-BLOC

## (undated) DEVICE — COVER CAMERA IN-LIGHT DISP LT-C02

## (undated) DEVICE — INTRO SHEATH 8FRX10CM PINNACLE RSS802

## (undated) DEVICE — DRSG MEDIPORE 3 1/2X13 3/4" 3573

## (undated) DEVICE — GLOVE PROTEXIS BLUE W/NEU-THERA 6.5  2D73EB65

## (undated) DEVICE — SU VICRYL 3-0 FS-1 27" J442H

## (undated) DEVICE — ENDO DEVICE LOCKING AND BIOPSY CAP M00545261

## (undated) DEVICE — SU VICRYL 3-0 SH 27" UND J416H

## (undated) DEVICE — BONE WAX 2.5GM W31G

## (undated) DEVICE — ENDO TROCAR FIRST ENTRY KII FIOS Z-THRD 12X100MM CTF73

## (undated) DEVICE — SU ETHIBOND 3-0 BBDA 36" X588H

## (undated) DEVICE — TIES BANDING T50R

## (undated) DEVICE — CATH ANGIO INFINITI JR4 4FRX100CM 538421

## (undated) DEVICE — CUP AND LID 2PK 2OZ STERILE  SSK9006A

## (undated) DEVICE — NDL COUNTER 20CT 31142493

## (undated) DEVICE — SURGICEL HEMOSTAT 4X8" 1952

## (undated) DEVICE — SPONGE KITTNER 30-101

## (undated) DEVICE — SU ETHIBOND 2-0 SHDA 30" X563H

## (undated) DEVICE — SU ETHIBOND 0 CT-1 CR 8X18" CX21D

## (undated) DEVICE — STPL LINEAR 90 X 3.5MM TA9035S

## (undated) DEVICE — GUIDEWIRE VASC 0.014INX180CM RUNTHROUGH 25-1011

## (undated) DEVICE — SU PROLENE 4-0 SHDA 36" 8521H

## (undated) DEVICE — ENDO TROCAR BLUNT TIP KII BALLOON 12X100MM C0R47

## (undated) DEVICE — SU PLEDGET SOFT TFE 13MMX7MMX1.5MM D7044

## (undated) DEVICE — TAPE CLOTH 3" CARDINAL 3TRCL03

## (undated) DEVICE — SU PROLENE 3-0 SHDA 36" 8522H

## (undated) DEVICE — CLOSURE DEVICE 6FR VASC PROGLIDE MEDICATED SUTURE 12673-03

## (undated) DEVICE — SU VICRYL 2-0 TIE 54" J615H

## (undated) DEVICE — CATH LAUNCHER 6FR EBU 3.5 LA6EBU35

## (undated) DEVICE — SU STEEL MYO/WIRE II STERNOTOMY 8 BE-1 3X14" 048-217

## (undated) DEVICE — DRAPE GYN/UROLOGY FLUID POUCH TUR 29455

## (undated) DEVICE — SU PLEDGET SOFT TFE 3/8"X3/26"X1/16" PCP40

## (undated) DEVICE — WIRE GUIDE 0.035"X150CM EMERALD J TIP 502521

## (undated) DEVICE — DEVICE CATH STABILIZATION STATLOCK FOLEY 3-WAY FOL0105

## (undated) DEVICE — SU VICRYL 2-0 SH 27" UND J417H

## (undated) DEVICE — ENDO TUBING CO2 SMARTCAP STERILE DISP 100145CO2EXT

## (undated) DEVICE — SOL BENZOIN 0.5OZ

## (undated) DEVICE — SU PDS II 5-0 RB-1 27" Z303H

## (undated) RX ORDER — PROPOFOL 10 MG/ML
INJECTION, EMULSION INTRAVENOUS
Status: DISPENSED
Start: 2019-03-27

## (undated) RX ORDER — KETAMINE HCL IN 0.9 % NACL 50 MG/5 ML
SYRINGE (ML) INTRAVENOUS
Status: DISPENSED
Start: 2018-12-11

## (undated) RX ORDER — CEFUROXIME SODIUM 1.5 G/16ML
INJECTION, POWDER, FOR SOLUTION INTRAVENOUS
Status: DISPENSED
Start: 2019-12-19

## (undated) RX ORDER — ACETAMINOPHEN 325 MG/1
TABLET ORAL
Status: DISPENSED
Start: 2022-06-24

## (undated) RX ORDER — FENTANYL CITRATE 50 UG/ML
INJECTION, SOLUTION INTRAMUSCULAR; INTRAVENOUS
Status: DISPENSED
Start: 2021-06-07

## (undated) RX ORDER — FENTANYL CITRATE 50 UG/ML
INJECTION, SOLUTION INTRAMUSCULAR; INTRAVENOUS
Status: DISPENSED
Start: 2018-12-11

## (undated) RX ORDER — LIDOCAINE 40 MG/G
CREAM TOPICAL
Status: DISPENSED
Start: 2018-08-24

## (undated) RX ORDER — NITROGLYCERIN 5 MG/ML
VIAL (ML) INTRAVENOUS
Status: DISPENSED
Start: 2018-02-08

## (undated) RX ORDER — LIDOCAINE HYDROCHLORIDE 20 MG/ML
INJECTION, SOLUTION EPIDURAL; INFILTRATION; INTRACAUDAL; PERINEURAL
Status: DISPENSED
Start: 2018-12-11

## (undated) RX ORDER — PROPOFOL 10 MG/ML
INJECTION, EMULSION INTRAVENOUS
Status: DISPENSED
Start: 2018-01-15

## (undated) RX ORDER — LIDOCAINE 40 MG/G
CREAM TOPICAL
Status: DISPENSED
Start: 2024-06-12

## (undated) RX ORDER — ACETAMINOPHEN 325 MG/1
TABLET ORAL
Status: DISPENSED
Start: 2019-12-19

## (undated) RX ORDER — SODIUM CHLORIDE 9 MG/ML
INJECTION, SOLUTION INTRAVENOUS
Status: DISPENSED
Start: 2020-07-15

## (undated) RX ORDER — PROPOFOL 10 MG/ML
INJECTION, EMULSION INTRAVENOUS
Status: DISPENSED
Start: 2018-06-14

## (undated) RX ORDER — ASPIRIN 81 MG/1
TABLET, CHEWABLE ORAL
Status: DISPENSED
Start: 2024-07-15

## (undated) RX ORDER — FENTANYL CITRATE 50 UG/ML
INJECTION, SOLUTION INTRAMUSCULAR; INTRAVENOUS
Status: DISPENSED
Start: 2019-03-27

## (undated) RX ORDER — GRANISETRON HYDROCHLORIDE 1 MG/ML
INJECTION INTRAVENOUS
Status: DISPENSED
Start: 2018-12-11

## (undated) RX ORDER — LIDOCAINE 40 MG/G
CREAM TOPICAL
Status: DISPENSED
Start: 2022-06-13

## (undated) RX ORDER — PROTAMINE SULFATE 10 MG/ML
INJECTION, SOLUTION INTRAVENOUS
Status: DISPENSED
Start: 2018-06-14

## (undated) RX ORDER — CALCIUM GLUCONATE 94 MG/ML
INJECTION, SOLUTION INTRAVENOUS
Status: DISPENSED
Start: 2019-03-27

## (undated) RX ORDER — LIDOCAINE 40 MG/G
CREAM TOPICAL
Status: DISPENSED
Start: 2019-02-01

## (undated) RX ORDER — ONDANSETRON 2 MG/ML
INJECTION INTRAMUSCULAR; INTRAVENOUS
Status: DISPENSED
Start: 2019-12-19

## (undated) RX ORDER — EPHEDRINE SULFATE 50 MG/ML
INJECTION, SOLUTION INTRAMUSCULAR; INTRAVENOUS; SUBCUTANEOUS
Status: DISPENSED
Start: 2017-06-22

## (undated) RX ORDER — NITROGLYCERIN 5 MG/ML
VIAL (ML) INTRAVENOUS
Status: DISPENSED
Start: 2020-07-15

## (undated) RX ORDER — DEXAMETHASONE SODIUM PHOSPHATE 4 MG/ML
INJECTION, SOLUTION INTRA-ARTICULAR; INTRALESIONAL; INTRAMUSCULAR; INTRAVENOUS; SOFT TISSUE
Status: DISPENSED
Start: 2019-03-27

## (undated) RX ORDER — HEPARIN SODIUM 1000 [USP'U]/ML
INJECTION, SOLUTION INTRAVENOUS; SUBCUTANEOUS
Status: DISPENSED
Start: 2018-02-08

## (undated) RX ORDER — ONDANSETRON 2 MG/ML
INJECTION INTRAMUSCULAR; INTRAVENOUS
Status: DISPENSED
Start: 2019-05-08

## (undated) RX ORDER — FENTANYL CITRATE 50 UG/ML
INJECTION, SOLUTION INTRAMUSCULAR; INTRAVENOUS
Status: DISPENSED
Start: 2024-07-15

## (undated) RX ORDER — ONDANSETRON 2 MG/ML
INJECTION INTRAMUSCULAR; INTRAVENOUS
Status: DISPENSED
Start: 2018-08-12

## (undated) RX ORDER — PHENYLEPHRINE HCL IN 0.9% NACL 1 MG/10 ML
SYRINGE (ML) INTRAVENOUS
Status: DISPENSED
Start: 2018-01-15

## (undated) RX ORDER — SODIUM CHLORIDE 9 MG/ML
INJECTION, SOLUTION INTRAVENOUS
Status: DISPENSED
Start: 2024-06-12

## (undated) RX ORDER — LIDOCAINE HYDROCHLORIDE 10 MG/ML
INJECTION, SOLUTION EPIDURAL; INFILTRATION; INTRACAUDAL; PERINEURAL
Status: DISPENSED
Start: 2019-06-28

## (undated) RX ORDER — HYDROMORPHONE HCL IN WATER/PF 6 MG/30 ML
PATIENT CONTROLLED ANALGESIA SYRINGE INTRAVENOUS
Status: DISPENSED
Start: 2023-05-26

## (undated) RX ORDER — LIDOCAINE 40 MG/G
CREAM TOPICAL
Status: DISPENSED
Start: 2024-07-15

## (undated) RX ORDER — ONDANSETRON 2 MG/ML
INJECTION INTRAMUSCULAR; INTRAVENOUS
Status: DISPENSED
Start: 2018-11-21

## (undated) RX ORDER — DIPHENHYDRAMINE HYDROCHLORIDE 50 MG/ML
INJECTION INTRAMUSCULAR; INTRAVENOUS
Status: DISPENSED
Start: 2023-05-26

## (undated) RX ORDER — HYDRALAZINE HYDROCHLORIDE 20 MG/ML
INJECTION INTRAMUSCULAR; INTRAVENOUS
Status: DISPENSED
Start: 2022-06-24

## (undated) RX ORDER — DEXTROSE MONOHYDRATE 25 G/50ML
INJECTION, SOLUTION INTRAVENOUS
Status: DISPENSED
Start: 2018-07-18

## (undated) RX ORDER — NITROGLYCERIN 5 MG/ML
VIAL (ML) INTRAVENOUS
Status: DISPENSED
Start: 2024-07-15

## (undated) RX ORDER — FENTANYL CITRATE 50 UG/ML
INJECTION, SOLUTION INTRAMUSCULAR; INTRAVENOUS
Status: DISPENSED
Start: 2018-02-03

## (undated) RX ORDER — DEXAMETHASONE SODIUM PHOSPHATE 4 MG/ML
INJECTION, SOLUTION INTRA-ARTICULAR; INTRALESIONAL; INTRAMUSCULAR; INTRAVENOUS; SOFT TISSUE
Status: DISPENSED
Start: 2017-06-22

## (undated) RX ORDER — FENTANYL CITRATE 50 UG/ML
INJECTION, SOLUTION INTRAMUSCULAR; INTRAVENOUS
Status: DISPENSED
Start: 2018-06-14

## (undated) RX ORDER — ATROPINE SULFATE 0.4 MG/ML
AMPUL (ML) INJECTION
Status: DISPENSED
Start: 2019-03-27

## (undated) RX ORDER — DEXTROSE MONOHYDRATE 25 G/50ML
INJECTION, SOLUTION INTRAVENOUS
Status: DISPENSED
Start: 2019-03-27

## (undated) RX ORDER — NITROGLYCERIN 5 MG/ML
VIAL (ML) INTRAVENOUS
Status: DISPENSED
Start: 2018-07-18

## (undated) RX ORDER — LIDOCAINE HYDROCHLORIDE 20 MG/ML
INJECTION, SOLUTION EPIDURAL; INFILTRATION; INTRACAUDAL; PERINEURAL
Status: DISPENSED
Start: 2018-01-15

## (undated) RX ORDER — VERAPAMIL HYDROCHLORIDE 2.5 MG/ML
INJECTION, SOLUTION INTRAVENOUS
Status: DISPENSED
Start: 2018-02-08

## (undated) RX ORDER — FENTANYL CITRATE 50 UG/ML
INJECTION, SOLUTION INTRAMUSCULAR; INTRAVENOUS
Status: DISPENSED
Start: 2018-08-14

## (undated) RX ORDER — HEPARIN SODIUM 1000 [USP'U]/ML
INJECTION, SOLUTION INTRAVENOUS; SUBCUTANEOUS
Status: DISPENSED
Start: 2019-11-22

## (undated) RX ORDER — FENTANYL CITRATE 50 UG/ML
INJECTION, SOLUTION INTRAMUSCULAR; INTRAVENOUS
Status: DISPENSED
Start: 2022-06-24

## (undated) RX ORDER — LIDOCAINE 40 MG/G
CREAM TOPICAL
Status: DISPENSED
Start: 2018-08-02

## (undated) RX ORDER — BUPIVACAINE HYDROCHLORIDE 5 MG/ML
INJECTION, SOLUTION PERINEURAL
Status: DISPENSED
Start: 2022-06-24

## (undated) RX ORDER — SODIUM CHLORIDE 9 MG/ML
INJECTION, SOLUTION INTRAVENOUS
Status: DISPENSED
Start: 2019-11-22

## (undated) RX ORDER — LIDOCAINE 40 MG/G
CREAM TOPICAL
Status: DISPENSED
Start: 2018-10-10

## (undated) RX ORDER — AMINOPHYLLINE 25 MG/ML
INJECTION, SOLUTION INTRAVENOUS
Status: DISPENSED
Start: 2020-07-15

## (undated) RX ORDER — FUROSEMIDE 10 MG/ML
INJECTION INTRAMUSCULAR; INTRAVENOUS
Status: DISPENSED
Start: 2019-12-19

## (undated) RX ORDER — LIDOCAINE 40 MG/G
CREAM TOPICAL
Status: DISPENSED
Start: 2018-03-28

## (undated) RX ORDER — CIPROFLOXACIN 2 MG/ML
INJECTION, SOLUTION INTRAVENOUS
Status: DISPENSED
Start: 2018-12-11

## (undated) RX ORDER — PHENYLEPHRINE HCL IN 0.9% NACL 1 MG/10 ML
SYRINGE (ML) INTRAVENOUS
Status: DISPENSED
Start: 2017-06-22

## (undated) RX ORDER — DEXTROSE, SODIUM CHLORIDE, SODIUM LACTATE, POTASSIUM CHLORIDE, AND CALCIUM CHLORIDE 5; .6; .31; .03; .02 G/100ML; G/100ML; G/100ML; G/100ML; G/100ML
INJECTION, SOLUTION INTRAVENOUS
Status: DISPENSED
Start: 2019-12-19

## (undated) RX ORDER — PAPAVERINE HYDROCHLORIDE 30 MG/ML
INJECTION INTRAMUSCULAR; INTRAVENOUS
Status: DISPENSED
Start: 2019-12-19

## (undated) RX ORDER — ONDANSETRON 2 MG/ML
INJECTION INTRAMUSCULAR; INTRAVENOUS
Status: DISPENSED
Start: 2018-01-15

## (undated) RX ORDER — LIDOCAINE 40 MG/G
CREAM TOPICAL
Status: DISPENSED
Start: 2019-03-22

## (undated) RX ORDER — CEFAZOLIN SODIUM 1 G/3ML
INJECTION, POWDER, FOR SOLUTION INTRAMUSCULAR; INTRAVENOUS
Status: DISPENSED
Start: 2018-08-14

## (undated) RX ORDER — SODIUM CHLORIDE 9 MG/ML
INJECTION, SOLUTION INTRAVENOUS
Status: DISPENSED
Start: 2019-12-19

## (undated) RX ORDER — FENTANYL CITRATE 50 UG/ML
INJECTION, SOLUTION INTRAMUSCULAR; INTRAVENOUS
Status: DISPENSED
Start: 2019-02-21

## (undated) RX ORDER — FENTANYL CITRATE 50 UG/ML
INJECTION, SOLUTION INTRAMUSCULAR; INTRAVENOUS
Status: DISPENSED
Start: 2018-05-07

## (undated) RX ORDER — CLINDAMYCIN PHOSPHATE 150 MG/ML
INJECTION, SOLUTION INTRAVENOUS
Status: DISPENSED
Start: 2018-01-16

## (undated) RX ORDER — SODIUM CHLORIDE 9 MG/ML
INJECTION, SOLUTION INTRAVENOUS
Status: DISPENSED
Start: 2021-06-07

## (undated) RX ORDER — HEPARIN SODIUM 1000 [USP'U]/ML
INJECTION, SOLUTION INTRAVENOUS; SUBCUTANEOUS
Status: DISPENSED
Start: 2019-12-19

## (undated) RX ORDER — LIDOCAINE HYDROCHLORIDE 10 MG/ML
INJECTION, SOLUTION EPIDURAL; INFILTRATION; INTRACAUDAL; PERINEURAL
Status: DISPENSED
Start: 2019-09-25

## (undated) RX ORDER — VERAPAMIL HYDROCHLORIDE 2.5 MG/ML
INJECTION, SOLUTION INTRAVENOUS
Status: DISPENSED
Start: 2019-12-19

## (undated) RX ORDER — HEPARIN SODIUM 1000 [USP'U]/ML
INJECTION, SOLUTION INTRAVENOUS; SUBCUTANEOUS
Status: DISPENSED
Start: 2018-02-03

## (undated) RX ORDER — ALBUMIN, HUMAN INJ 5% 5 %
SOLUTION INTRAVENOUS
Status: DISPENSED
Start: 2018-06-14

## (undated) RX ORDER — DEXAMETHASONE SODIUM PHOSPHATE 4 MG/ML
INJECTION, SOLUTION INTRA-ARTICULAR; INTRALESIONAL; INTRAMUSCULAR; INTRAVENOUS; SOFT TISSUE
Status: DISPENSED
Start: 2022-06-24

## (undated) RX ORDER — ACETAMINOPHEN 325 MG/1
TABLET ORAL
Status: DISPENSED
Start: 2018-12-11

## (undated) RX ORDER — ASPIRIN 325 MG
TABLET ORAL
Status: DISPENSED
Start: 2019-06-28

## (undated) RX ORDER — NITROGLYCERIN 5 MG/ML
VIAL (ML) INTRAVENOUS
Status: DISPENSED
Start: 2019-06-28

## (undated) RX ORDER — PROPOFOL 10 MG/ML
INJECTION, EMULSION INTRAVENOUS
Status: DISPENSED
Start: 2018-12-11

## (undated) RX ORDER — GLYCOPYRROLATE 0.2 MG/ML
INJECTION, SOLUTION INTRAMUSCULAR; INTRAVENOUS
Status: DISPENSED
Start: 2017-06-22

## (undated) RX ORDER — PROPOFOL 10 MG/ML
INJECTION, EMULSION INTRAVENOUS
Status: DISPENSED
Start: 2018-08-14

## (undated) RX ORDER — GLYCOPYRROLATE 0.2 MG/ML
INJECTION, SOLUTION INTRAMUSCULAR; INTRAVENOUS
Status: DISPENSED
Start: 2018-08-12

## (undated) RX ORDER — NEOSTIGMINE METHYLSULFATE 5 MG/5 ML
SYRINGE (ML) INTRAVENOUS
Status: DISPENSED
Start: 2017-06-22

## (undated) RX ORDER — PHENYLEPHRINE HCL IN 0.9% NACL 1 MG/10 ML
SYRINGE (ML) INTRAVENOUS
Status: DISPENSED
Start: 2019-03-27

## (undated) RX ORDER — REGADENOSON 0.08 MG/ML
INJECTION, SOLUTION INTRAVENOUS
Status: DISPENSED
Start: 2021-10-12

## (undated) RX ORDER — SODIUM CHLORIDE, SODIUM LACTATE, POTASSIUM CHLORIDE, CALCIUM CHLORIDE 600; 310; 30; 20 MG/100ML; MG/100ML; MG/100ML; MG/100ML
INJECTION, SOLUTION INTRAVENOUS
Status: DISPENSED
Start: 2018-12-11

## (undated) RX ORDER — FENTANYL CITRATE 50 UG/ML
INJECTION, SOLUTION INTRAMUSCULAR; INTRAVENOUS
Status: DISPENSED
Start: 2018-08-12

## (undated) RX ORDER — LIDOCAINE 40 MG/G
CREAM TOPICAL
Status: DISPENSED
Start: 2019-06-07

## (undated) RX ORDER — GLYCOPYRROLATE 0.2 MG/ML
INJECTION, SOLUTION INTRAMUSCULAR; INTRAVENOUS
Status: DISPENSED
Start: 2018-12-11

## (undated) RX ORDER — PROPOFOL 10 MG/ML
INJECTION, EMULSION INTRAVENOUS
Status: DISPENSED
Start: 2017-06-22

## (undated) RX ORDER — GABAPENTIN 300 MG/1
CAPSULE ORAL
Status: DISPENSED
Start: 2019-05-08

## (undated) RX ORDER — ACETAMINOPHEN 325 MG/1
TABLET ORAL
Status: DISPENSED
Start: 2019-05-08

## (undated) RX ORDER — ESMOLOL HYDROCHLORIDE 10 MG/ML
INJECTION INTRAVENOUS
Status: DISPENSED
Start: 2017-06-22

## (undated) RX ORDER — PROPOFOL 10 MG/ML
INJECTION, EMULSION INTRAVENOUS
Status: DISPENSED
Start: 2019-05-08

## (undated) RX ORDER — CIPROFLOXACIN 2 MG/ML
INJECTION, SOLUTION INTRAVENOUS
Status: DISPENSED
Start: 2019-03-27

## (undated) RX ORDER — BETAMETHASONE SODIUM PHOSPHATE AND BETAMETHASONE ACETATE 3; 3 MG/ML; MG/ML
INJECTION, SUSPENSION INTRA-ARTICULAR; INTRALESIONAL; INTRAMUSCULAR; SOFT TISSUE
Status: DISPENSED
Start: 2019-10-07

## (undated) RX ORDER — FENTANYL CITRATE 50 UG/ML
INJECTION, SOLUTION INTRAMUSCULAR; INTRAVENOUS
Status: DISPENSED
Start: 2019-05-08

## (undated) RX ORDER — FENTANYL CITRATE 50 UG/ML
INJECTION, SOLUTION INTRAMUSCULAR; INTRAVENOUS
Status: DISPENSED
Start: 2022-06-13

## (undated) RX ORDER — LIDOCAINE HYDROCHLORIDE 10 MG/ML
INJECTION, SOLUTION EPIDURAL; INFILTRATION; INTRACAUDAL; PERINEURAL
Status: DISPENSED
Start: 2018-03-28

## (undated) RX ORDER — SODIUM CHLORIDE 9 MG/ML
INJECTION, SOLUTION INTRAVENOUS
Status: DISPENSED
Start: 2018-07-18

## (undated) RX ORDER — SODIUM CHLORIDE 9 MG/ML
INJECTION, SOLUTION INTRAVENOUS
Status: DISPENSED
Start: 2024-07-15

## (undated) RX ORDER — LIDOCAINE HYDROCHLORIDE 10 MG/ML
INJECTION, SOLUTION EPIDURAL; INFILTRATION; INTRACAUDAL; PERINEURAL
Status: DISPENSED
Start: 2019-11-22

## (undated) RX ORDER — SODIUM CHLORIDE 9 MG/ML
INJECTION, SOLUTION INTRAVENOUS
Status: DISPENSED
Start: 2022-06-13

## (undated) RX ORDER — FENTANYL CITRATE 50 UG/ML
INJECTION, SOLUTION INTRAMUSCULAR; INTRAVENOUS
Status: DISPENSED
Start: 2019-11-22

## (undated) RX ORDER — ONDANSETRON 2 MG/ML
INJECTION INTRAMUSCULAR; INTRAVENOUS
Status: DISPENSED
Start: 2017-06-22

## (undated) RX ORDER — FENTANYL CITRATE 50 UG/ML
INJECTION, SOLUTION INTRAMUSCULAR; INTRAVENOUS
Status: DISPENSED
Start: 2017-06-22

## (undated) RX ORDER — LIDOCAINE 40 MG/G
CREAM TOPICAL
Status: DISPENSED
Start: 2018-11-05

## (undated) RX ORDER — NITROGLYCERIN 5 MG/ML
VIAL (ML) INTRAVENOUS
Status: DISPENSED
Start: 2018-02-03

## (undated) RX ORDER — CEFAZOLIN SODIUM 1 G/3ML
INJECTION, POWDER, FOR SOLUTION INTRAMUSCULAR; INTRAVENOUS
Status: DISPENSED
Start: 2019-12-19

## (undated) RX ORDER — LIDOCAINE HYDROCHLORIDE 10 MG/ML
INJECTION, SOLUTION EPIDURAL; INFILTRATION; INTRACAUDAL; PERINEURAL
Status: DISPENSED
Start: 2018-04-22

## (undated) RX ORDER — FENTANYL CITRATE 50 UG/ML
INJECTION, SOLUTION INTRAMUSCULAR; INTRAVENOUS
Status: DISPENSED
Start: 2018-03-15

## (undated) RX ORDER — LIDOCAINE HYDROCHLORIDE 10 MG/ML
INJECTION, SOLUTION EPIDURAL; INFILTRATION; INTRACAUDAL; PERINEURAL
Status: DISPENSED
Start: 2018-02-04

## (undated) RX ORDER — PROPOFOL 10 MG/ML
INJECTION, EMULSION INTRAVENOUS
Status: DISPENSED
Start: 2018-08-12

## (undated) RX ORDER — NITROGLYCERIN 5 MG/ML
VIAL (ML) INTRAVENOUS
Status: DISPENSED
Start: 2021-06-07

## (undated) RX ORDER — PHENYLEPHRINE HCL IN 0.9% NACL 1 MG/10 ML
SYRINGE (ML) INTRAVENOUS
Status: DISPENSED
Start: 2018-06-14

## (undated) RX ORDER — HYDROMORPHONE HYDROCHLORIDE 1 MG/ML
INJECTION, SOLUTION INTRAMUSCULAR; INTRAVENOUS; SUBCUTANEOUS
Status: DISPENSED
Start: 2019-03-27

## (undated) RX ORDER — ONDANSETRON 2 MG/ML
INJECTION INTRAMUSCULAR; INTRAVENOUS
Status: DISPENSED
Start: 2018-05-07

## (undated) RX ORDER — ASPIRIN 81 MG/1
TABLET, CHEWABLE ORAL
Status: DISPENSED
Start: 2022-06-13

## (undated) RX ORDER — IPRATROPIUM BROMIDE AND ALBUTEROL SULFATE 2.5; .5 MG/3ML; MG/3ML
SOLUTION RESPIRATORY (INHALATION)
Status: DISPENSED
Start: 2019-05-08

## (undated) RX ORDER — ALBUMIN, HUMAN INJ 5% 5 %
SOLUTION INTRAVENOUS
Status: DISPENSED
Start: 2018-12-11

## (undated) RX ORDER — FENTANYL CITRATE 50 UG/ML
INJECTION, SOLUTION INTRAMUSCULAR; INTRAVENOUS
Status: DISPENSED
Start: 2018-03-22

## (undated) RX ORDER — HEPARIN SODIUM 1000 [USP'U]/ML
INJECTION, SOLUTION INTRAVENOUS; SUBCUTANEOUS
Status: DISPENSED
Start: 2019-09-25

## (undated) RX ORDER — LIDOCAINE 40 MG/G
CREAM TOPICAL
Status: DISPENSED
Start: 2018-09-05

## (undated) RX ORDER — ONDANSETRON 2 MG/ML
INJECTION INTRAMUSCULAR; INTRAVENOUS
Status: DISPENSED
Start: 2018-08-14

## (undated) RX ORDER — SIMETHICONE 20 MG/.3ML
EMULSION ORAL
Status: DISPENSED
Start: 2019-02-21

## (undated) RX ORDER — LIDOCAINE 40 MG/G
CREAM TOPICAL
Status: DISPENSED
Start: 2019-10-28

## (undated) RX ORDER — HYDROMORPHONE HYDROCHLORIDE 1 MG/ML
INJECTION, SOLUTION INTRAMUSCULAR; INTRAVENOUS; SUBCUTANEOUS
Status: DISPENSED
Start: 2018-08-14

## (undated) RX ORDER — ROCURONIUM BROMIDE 50 MG/5 ML
SYRINGE (ML) INTRAVENOUS
Status: DISPENSED
Start: 2018-05-07

## (undated) RX ORDER — ACETAMINOPHEN 325 MG/1
TABLET ORAL
Status: DISPENSED
Start: 2018-01-15

## (undated) RX ORDER — ALBUTEROL SULFATE 90 UG/1
AEROSOL, METERED RESPIRATORY (INHALATION)
Status: DISPENSED
Start: 2018-06-14

## (undated) RX ORDER — POTASSIUM CHLORIDE 750 MG/1
TABLET, EXTENDED RELEASE ORAL
Status: DISPENSED
Start: 2022-06-13

## (undated) RX ORDER — EPHEDRINE SULFATE 50 MG/ML
INJECTION, SOLUTION INTRAMUSCULAR; INTRAVENOUS; SUBCUTANEOUS
Status: DISPENSED
Start: 2018-01-15

## (undated) RX ORDER — LIDOCAINE 40 MG/G
CREAM TOPICAL
Status: DISPENSED
Start: 2018-07-05

## (undated) RX ORDER — SODIUM CHLORIDE 9 MG/ML
INJECTION, SOLUTION INTRAVENOUS
Status: DISPENSED
Start: 2018-03-22

## (undated) RX ORDER — ONDANSETRON 2 MG/ML
INJECTION INTRAMUSCULAR; INTRAVENOUS
Status: DISPENSED
Start: 2019-03-27

## (undated) RX ORDER — PROPOFOL 10 MG/ML
INJECTION, EMULSION INTRAVENOUS
Status: DISPENSED
Start: 2018-05-07

## (undated) RX ORDER — ETOMIDATE 2 MG/ML
INJECTION INTRAVENOUS
Status: DISPENSED
Start: 2018-06-14

## (undated) RX ORDER — FENTANYL CITRATE 50 UG/ML
INJECTION, SOLUTION INTRAMUSCULAR; INTRAVENOUS
Status: DISPENSED
Start: 2019-12-19

## (undated) RX ORDER — HEPARIN SODIUM 1000 [USP'U]/ML
INJECTION, SOLUTION INTRAVENOUS; SUBCUTANEOUS
Status: DISPENSED
Start: 2021-06-07

## (undated) RX ORDER — PROPOFOL 10 MG/ML
INJECTION, EMULSION INTRAVENOUS
Status: DISPENSED
Start: 2018-11-21

## (undated) RX ORDER — ONDANSETRON 2 MG/ML
INJECTION INTRAMUSCULAR; INTRAVENOUS
Status: DISPENSED
Start: 2022-06-24

## (undated) RX ORDER — PROPOFOL 10 MG/ML
INJECTION, EMULSION INTRAVENOUS
Status: DISPENSED
Start: 2021-11-08

## (undated) RX ORDER — PHENYLEPHRINE HCL IN 0.9% NACL 1 MG/10 ML
SYRINGE (ML) INTRAVENOUS
Status: DISPENSED
Start: 2019-12-19

## (undated) RX ORDER — DIPHENHYDRAMINE HYDROCHLORIDE 50 MG/ML
INJECTION INTRAMUSCULAR; INTRAVENOUS
Status: DISPENSED
Start: 2019-02-21

## (undated) RX ORDER — CLINDAMYCIN PHOSPHATE 150 MG/ML
INJECTION, SOLUTION INTRAVENOUS
Status: DISPENSED
Start: 2019-12-19

## (undated) RX ORDER — HYDROMORPHONE HYDROCHLORIDE 1 MG/ML
INJECTION, SOLUTION INTRAMUSCULAR; INTRAVENOUS; SUBCUTANEOUS
Status: DISPENSED
Start: 2018-12-11

## (undated) RX ORDER — PHENYLEPHRINE HCL IN 0.9% NACL 1 MG/10 ML
SYRINGE (ML) INTRAVENOUS
Status: DISPENSED
Start: 2018-12-11

## (undated) RX ORDER — LIDOCAINE HYDROCHLORIDE 10 MG/ML
INJECTION, SOLUTION EPIDURAL; INFILTRATION; INTRACAUDAL; PERINEURAL
Status: DISPENSED
Start: 2018-06-20

## (undated) RX ORDER — REGADENOSON 0.08 MG/ML
INJECTION, SOLUTION INTRAVENOUS
Status: DISPENSED
Start: 2023-06-19

## (undated) RX ORDER — HEPARIN SODIUM 1000 [USP'U]/ML
INJECTION, SOLUTION INTRAVENOUS; SUBCUTANEOUS
Status: DISPENSED
Start: 2022-06-13

## (undated) RX ORDER — FENTANYL CITRATE 50 UG/ML
INJECTION, SOLUTION INTRAMUSCULAR; INTRAVENOUS
Status: DISPENSED
Start: 2018-01-15

## (undated) RX ORDER — HYDROMORPHONE HYDROCHLORIDE 1 MG/ML
INJECTION, SOLUTION INTRAMUSCULAR; INTRAVENOUS; SUBCUTANEOUS
Status: DISPENSED
Start: 2022-06-24

## (undated) RX ORDER — ACETAMINOPHEN 325 MG/1
TABLET ORAL
Status: DISPENSED
Start: 2018-08-14

## (undated) RX ORDER — EPHEDRINE SULFATE 50 MG/ML
INJECTION, SOLUTION INTRAMUSCULAR; INTRAVENOUS; SUBCUTANEOUS
Status: DISPENSED
Start: 2018-05-07

## (undated) RX ORDER — CLINDAMYCIN PHOSPHATE 900 MG/50ML
INJECTION, SOLUTION INTRAVENOUS
Status: DISPENSED
Start: 2018-03-22

## (undated) RX ORDER — PHENYLEPHRINE HCL IN 0.9% NACL 1 MG/10 ML
SYRINGE (ML) INTRAVENOUS
Status: DISPENSED
Start: 2018-05-07

## (undated) RX ORDER — GRANISETRON HYDROCHLORIDE 1 MG/ML
INJECTION INTRAVENOUS
Status: DISPENSED
Start: 2019-03-27

## (undated) RX ORDER — HEPARIN SODIUM 1000 [USP'U]/ML
INJECTION, SOLUTION INTRAVENOUS; SUBCUTANEOUS
Status: DISPENSED
Start: 2018-04-17

## (undated) RX ORDER — GLYCOPYRROLATE 0.2 MG/ML
INJECTION, SOLUTION INTRAMUSCULAR; INTRAVENOUS
Status: DISPENSED
Start: 2019-03-27

## (undated) RX ORDER — FENTANYL CITRATE 50 UG/ML
INJECTION, SOLUTION INTRAMUSCULAR; INTRAVENOUS
Status: DISPENSED
Start: 2018-02-08

## (undated) RX ORDER — SODIUM CHLORIDE 9 MG/ML
INJECTION, SOLUTION INTRAVENOUS
Status: DISPENSED
Start: 2019-09-25

## (undated) RX ORDER — AMINOPHYLLINE 25 MG/ML
INJECTION, SOLUTION INTRAVENOUS
Status: DISPENSED
Start: 2023-06-19

## (undated) RX ORDER — EPHEDRINE SULFATE 50 MG/ML
INJECTION, SOLUTION INTRAMUSCULAR; INTRAVENOUS; SUBCUTANEOUS
Status: DISPENSED
Start: 2018-08-14

## (undated) RX ORDER — EPHEDRINE SULFATE 50 MG/ML
INJECTION, SOLUTION INTRAMUSCULAR; INTRAVENOUS; SUBCUTANEOUS
Status: DISPENSED
Start: 2019-03-27

## (undated) RX ORDER — LIDOCAINE HYDROCHLORIDE 10 MG/ML
INJECTION, SOLUTION EPIDURAL; INFILTRATION; INTRACAUDAL; PERINEURAL
Status: DISPENSED
Start: 2018-12-03

## (undated) RX ORDER — NEOSTIGMINE METHYLSULFATE 1 MG/ML
VIAL (ML) INJECTION
Status: DISPENSED
Start: 2018-12-11

## (undated) RX ORDER — FENTANYL CITRATE 50 UG/ML
INJECTION, SOLUTION INTRAMUSCULAR; INTRAVENOUS
Status: DISPENSED
Start: 2019-09-25

## (undated) RX ORDER — HYDROMORPHONE HYDROCHLORIDE 1 MG/ML
INJECTION, SOLUTION INTRAMUSCULAR; INTRAVENOUS; SUBCUTANEOUS
Status: DISPENSED
Start: 2018-08-12

## (undated) RX ORDER — HYDROMORPHONE HYDROCHLORIDE 1 MG/ML
INJECTION, SOLUTION INTRAMUSCULAR; INTRAVENOUS; SUBCUTANEOUS
Status: DISPENSED
Start: 2019-12-19

## (undated) RX ORDER — GLYCOPYRROLATE 0.2 MG/ML
INJECTION, SOLUTION INTRAMUSCULAR; INTRAVENOUS
Status: DISPENSED
Start: 2022-06-24

## (undated) RX ORDER — FENTANYL CITRATE 50 UG/ML
INJECTION, SOLUTION INTRAMUSCULAR; INTRAVENOUS
Status: DISPENSED
Start: 2019-06-28

## (undated) RX ORDER — LIDOCAINE HYDROCHLORIDE 10 MG/ML
INJECTION, SOLUTION EPIDURAL; INFILTRATION; INTRACAUDAL; PERINEURAL
Status: DISPENSED
Start: 2018-08-10

## (undated) RX ORDER — REGADENOSON 0.08 MG/ML
INJECTION, SOLUTION INTRAVENOUS
Status: DISPENSED
Start: 2020-07-15

## (undated) RX ORDER — LIDOCAINE HYDROCHLORIDE 20 MG/ML
INJECTION, SOLUTION EPIDURAL; INFILTRATION; INTRACAUDAL; PERINEURAL
Status: DISPENSED
Start: 2017-06-22

## (undated) RX ORDER — LIDOCAINE HYDROCHLORIDE 20 MG/ML
INJECTION, SOLUTION EPIDURAL; INFILTRATION; INTRACAUDAL; PERINEURAL
Status: DISPENSED
Start: 2019-05-08

## (undated) RX ORDER — PAPAVERINE HYDROCHLORIDE 30 MG/ML
INJECTION INTRAMUSCULAR; INTRAVENOUS
Status: DISPENSED
Start: 2019-05-08

## (undated) RX ORDER — HEPARIN SODIUM 1000 [USP'U]/ML
INJECTION, SOLUTION INTRAVENOUS; SUBCUTANEOUS
Status: DISPENSED
Start: 2018-06-14

## (undated) RX ORDER — ADENOSINE 3 MG/ML
INJECTION, SOLUTION INTRAVENOUS
Status: DISPENSED
Start: 2018-02-08

## (undated) RX ORDER — HEPARIN SODIUM 1000 [USP'U]/ML
INJECTION, SOLUTION INTRAVENOUS; SUBCUTANEOUS
Status: DISPENSED
Start: 2024-07-15

## (undated) RX ORDER — ACETAMINOPHEN 325 MG/1
TABLET ORAL
Status: DISPENSED
Start: 2018-11-21

## (undated) RX ORDER — FENTANYL CITRATE 50 UG/ML
INJECTION, SOLUTION INTRAMUSCULAR; INTRAVENOUS
Status: DISPENSED
Start: 2023-05-26

## (undated) RX ORDER — DEXAMETHASONE SODIUM PHOSPHATE 4 MG/ML
INJECTION, SOLUTION INTRA-ARTICULAR; INTRALESIONAL; INTRAMUSCULAR; INTRAVENOUS; SOFT TISSUE
Status: DISPENSED
Start: 2018-11-21

## (undated) RX ORDER — GLYCOPYRROLATE 0.2 MG/ML
INJECTION, SOLUTION INTRAMUSCULAR; INTRAVENOUS
Status: DISPENSED
Start: 2018-06-14

## (undated) RX ORDER — FENTANYL CITRATE 50 UG/ML
INJECTION, SOLUTION INTRAMUSCULAR; INTRAVENOUS
Status: DISPENSED
Start: 2018-07-18

## (undated) RX ORDER — ASPIRIN 325 MG
TABLET ORAL
Status: DISPENSED
Start: 2022-06-13

## (undated) RX ORDER — HEPARIN SODIUM 1000 [USP'U]/ML
INJECTION, SOLUTION INTRAVENOUS; SUBCUTANEOUS
Status: DISPENSED
Start: 2020-07-15

## (undated) RX ORDER — AMINOPHYLLINE 25 MG/ML
INJECTION, SOLUTION INTRAVENOUS
Status: DISPENSED
Start: 2021-10-12

## (undated) RX ORDER — CLINDAMYCIN PHOSPHATE 900 MG/50ML
INJECTION, SOLUTION INTRAVENOUS
Status: DISPENSED
Start: 2022-06-24

## (undated) RX ORDER — LIDOCAINE HYDROCHLORIDE 10 MG/ML
INJECTION, SOLUTION EPIDURAL; INFILTRATION; INTRACAUDAL; PERINEURAL
Status: DISPENSED
Start: 2019-10-07

## (undated) RX ORDER — FENTANYL CITRATE 50 UG/ML
INJECTION, SOLUTION INTRAMUSCULAR; INTRAVENOUS
Status: DISPENSED
Start: 2018-11-21

## (undated) RX ORDER — BUPIVACAINE HYDROCHLORIDE 2.5 MG/ML
INJECTION, SOLUTION EPIDURAL; INFILTRATION; INTRACAUDAL
Status: DISPENSED
Start: 2018-08-12

## (undated) RX ORDER — LIDOCAINE HYDROCHLORIDE 10 MG/ML
INJECTION, SOLUTION EPIDURAL; INFILTRATION; INTRACAUDAL; PERINEURAL
Status: DISPENSED
Start: 2018-01-16

## (undated) RX ORDER — MAGNESIUM SULFATE HEPTAHYDRATE 40 MG/ML
INJECTION, SOLUTION INTRAVENOUS
Status: DISPENSED
Start: 2021-06-07

## (undated) RX ORDER — LIDOCAINE HYDROCHLORIDE 20 MG/ML
INJECTION, SOLUTION EPIDURAL; INFILTRATION; INTRACAUDAL; PERINEURAL
Status: DISPENSED
Start: 2018-05-07

## (undated) RX ORDER — LIDOCAINE HYDROCHLORIDE 10 MG/ML
INJECTION, SOLUTION INFILTRATION; PERINEURAL
Status: DISPENSED
Start: 2018-04-20

## (undated) RX ORDER — LIDOCAINE HYDROCHLORIDE 20 MG/ML
INJECTION, SOLUTION EPIDURAL; INFILTRATION; INTRACAUDAL; PERINEURAL
Status: DISPENSED
Start: 2019-12-19

## (undated) RX ORDER — LIDOCAINE HYDROCHLORIDE 20 MG/ML
INJECTION, SOLUTION EPIDURAL; INFILTRATION; INTRACAUDAL; PERINEURAL
Status: DISPENSED
Start: 2018-11-21

## (undated) RX ORDER — PHENYLEPHRINE HCL IN 0.9% NACL 1 MG/10 ML
SYRINGE (ML) INTRAVENOUS
Status: DISPENSED
Start: 2018-08-14

## (undated) RX ORDER — LIDOCAINE HYDROCHLORIDE 20 MG/ML
INJECTION, SOLUTION EPIDURAL; INFILTRATION; INTRACAUDAL; PERINEURAL
Status: DISPENSED
Start: 2019-03-27

## (undated) RX ORDER — CLINDAMYCIN PHOSPHATE 900 MG/50ML
INJECTION, SOLUTION INTRAVENOUS
Status: DISPENSED
Start: 2017-06-22

## (undated) RX ORDER — LIDOCAINE 40 MG/G
CREAM TOPICAL
Status: DISPENSED
Start: 2020-02-06

## (undated) RX ORDER — PROPOFOL 10 MG/ML
INJECTION, EMULSION INTRAVENOUS
Status: DISPENSED
Start: 2019-12-19

## (undated) RX ORDER — HEPARIN SODIUM 1000 [USP'U]/ML
INJECTION, SOLUTION INTRAVENOUS; SUBCUTANEOUS
Status: DISPENSED
Start: 2019-06-28

## (undated) RX ORDER — HEPARIN SOD,PORCINE/0.9 % NACL 5K/1000 ML
INTRAVENOUS SOLUTION INTRAVENOUS
Status: DISPENSED
Start: 2019-11-22

## (undated) RX ORDER — LIDOCAINE HYDROCHLORIDE 10 MG/ML
INJECTION, SOLUTION EPIDURAL; INFILTRATION; INTRACAUDAL; PERINEURAL
Status: DISPENSED
Start: 2018-03-22

## (undated) RX ORDER — NITROGLYCERIN 20 MG/100ML
INJECTION INTRAVENOUS
Status: DISPENSED
Start: 2018-06-14

## (undated) RX ORDER — SODIUM CHLORIDE 9 MG/ML
INJECTION, SOLUTION INTRAVENOUS
Status: DISPENSED
Start: 2019-06-07

## (undated) RX ORDER — BUPIVACAINE HYDROCHLORIDE 2.5 MG/ML
INJECTION, SOLUTION EPIDURAL; INFILTRATION; INTRACAUDAL
Status: DISPENSED
Start: 2018-08-14

## (undated) RX ORDER — FENTANYL CITRATE 50 UG/ML
INJECTION, SOLUTION INTRAMUSCULAR; INTRAVENOUS
Status: DISPENSED
Start: 2018-01-16

## (undated) RX ORDER — PROTAMINE SULFATE 10 MG/ML
INJECTION, SOLUTION INTRAVENOUS
Status: DISPENSED
Start: 2019-06-28

## (undated) RX ORDER — SODIUM CHLORIDE 450 MG/100ML
INJECTION, SOLUTION INTRAVENOUS
Status: DISPENSED
Start: 2019-12-19

## (undated) RX ORDER — HEPARIN SODIUM 1000 [USP'U]/ML
INJECTION, SOLUTION INTRAVENOUS; SUBCUTANEOUS
Status: DISPENSED
Start: 2018-07-18

## (undated) RX ORDER — LIDOCAINE 40 MG/G
CREAM TOPICAL
Status: DISPENSED
Start: 2018-12-03

## (undated) RX ORDER — LIDOCAINE HYDROCHLORIDE 20 MG/ML
SOLUTION OROPHARYNGEAL
Status: DISPENSED
Start: 2023-05-26

## (undated) RX ORDER — SODIUM CHLORIDE, SODIUM LACTATE, POTASSIUM CHLORIDE, CALCIUM CHLORIDE 600; 310; 30; 20 MG/100ML; MG/100ML; MG/100ML; MG/100ML
INJECTION, SOLUTION INTRAVENOUS
Status: DISPENSED
Start: 2019-03-27

## (undated) RX ORDER — LIDOCAINE 40 MG/G
CREAM TOPICAL
Status: DISPENSED
Start: 2018-08-10

## (undated) RX ORDER — NICARDIPINE HYDROCHLORIDE 2.5 MG/ML
INJECTION INTRAVENOUS
Status: DISPENSED
Start: 2018-07-18

## (undated) RX ORDER — ASPIRIN 81 MG/1
TABLET, CHEWABLE ORAL
Status: DISPENSED
Start: 2019-06-07

## (undated) RX ORDER — FENTANYL CITRATE 50 UG/ML
INJECTION, SOLUTION INTRAMUSCULAR; INTRAVENOUS
Status: DISPENSED
Start: 2018-05-03

## (undated) RX ORDER — HEPARIN SODIUM 1000 [USP'U]/ML
INJECTION, SOLUTION INTRAVENOUS; SUBCUTANEOUS
Status: DISPENSED
Start: 2018-03-22

## (undated) RX ORDER — HYDRALAZINE HYDROCHLORIDE 20 MG/ML
INJECTION INTRAMUSCULAR; INTRAVENOUS
Status: DISPENSED
Start: 2018-08-12

## (undated) RX ORDER — NICARDIPINE HCL-0.9% SOD CHLOR 1 MG/10 ML
SYRINGE (ML) INTRAVENOUS
Status: DISPENSED
Start: 2022-06-13

## (undated) RX ORDER — SODIUM CHLORIDE 9 MG/ML
INJECTION, SOLUTION INTRAVENOUS
Status: DISPENSED
Start: 2019-06-28

## (undated) RX ORDER — HYDRALAZINE HYDROCHLORIDE 20 MG/ML
INJECTION INTRAMUSCULAR; INTRAVENOUS
Status: DISPENSED
Start: 2021-06-07

## (undated) RX ORDER — LIDOCAINE HYDROCHLORIDE 10 MG/ML
INJECTION, SOLUTION EPIDURAL; INFILTRATION; INTRACAUDAL; PERINEURAL
Status: DISPENSED
Start: 2021-06-07

## (undated) RX ORDER — ASPIRIN 325 MG
TABLET ORAL
Status: DISPENSED
Start: 2024-07-15

## (undated) RX ORDER — CALCIUM CHLORIDE 100 MG/ML
INJECTION INTRAVENOUS; INTRAVENTRICULAR
Status: DISPENSED
Start: 2019-03-27

## (undated) RX ORDER — ASPIRIN 325 MG
TABLET ORAL
Status: DISPENSED
Start: 2021-06-07

## (undated) RX ORDER — LIDOCAINE HYDROCHLORIDE 10 MG/ML
INJECTION, SOLUTION INFILTRATION; PERINEURAL
Status: DISPENSED
Start: 2018-04-17

## (undated) RX ORDER — PHENYLEPHRINE HCL IN 0.9% NACL 1 MG/10 ML
SYRINGE (ML) INTRAVENOUS
Status: DISPENSED
Start: 2019-05-08

## (undated) RX ORDER — FENTANYL CITRATE 50 UG/ML
INJECTION, SOLUTION INTRAMUSCULAR; INTRAVENOUS
Status: DISPENSED
Start: 2020-07-15

## (undated) RX ORDER — BUPIVACAINE HYDROCHLORIDE 2.5 MG/ML
INJECTION, SOLUTION EPIDURAL; INFILTRATION; INTRACAUDAL
Status: DISPENSED
Start: 2019-05-08

## (undated) RX ORDER — HEPARIN SODIUM 1000 [USP'U]/ML
INJECTION, SOLUTION INTRAVENOUS; SUBCUTANEOUS
Status: DISPENSED
Start: 2019-05-08